# Patient Record
Sex: MALE | Race: WHITE | NOT HISPANIC OR LATINO | Employment: UNEMPLOYED | URBAN - METROPOLITAN AREA
[De-identification: names, ages, dates, MRNs, and addresses within clinical notes are randomized per-mention and may not be internally consistent; named-entity substitution may affect disease eponyms.]

---

## 2018-04-18 ENCOUNTER — APPOINTMENT (EMERGENCY)
Dept: RADIOLOGY | Facility: HOSPITAL | Age: 58
End: 2018-04-18
Payer: MEDICARE

## 2018-04-18 ENCOUNTER — HOSPITAL ENCOUNTER (EMERGENCY)
Facility: HOSPITAL | Age: 58
Discharge: HOME | End: 2018-04-18
Attending: EMERGENCY MEDICINE
Payer: MEDICARE

## 2018-04-18 VITALS
SYSTOLIC BLOOD PRESSURE: 189 MMHG | HEART RATE: 85 BPM | OXYGEN SATURATION: 97 % | RESPIRATION RATE: 16 BRPM | HEIGHT: 74 IN | TEMPERATURE: 97.9 F | BODY MASS INDEX: 30.16 KG/M2 | DIASTOLIC BLOOD PRESSURE: 106 MMHG | WEIGHT: 235 LBS

## 2018-04-18 DIAGNOSIS — M54.32 SCIATICA OF LEFT SIDE: Primary | ICD-10-CM

## 2018-04-18 LAB
BACTERIA UR QL AUTO: ABNORMAL /HPF
BILIRUB UR QL STRIP: NEGATIVE
CLARITY UR: CLEAR
COLOR UR: YELLOW
GLUCOSE UR STRIP-MCNC: NEGATIVE MG/DL
HGB UR QL STRIP.AUTO: NEGATIVE
KETONES UR STRIP-MCNC: NEGATIVE MG/DL
LEUKOCYTE ESTERASE UR QL STRIP: NEGATIVE
NITRITE UR QL STRIP: NEGATIVE
NON-SQ EPI CELLS URNS QL MICRO: ABNORMAL /HPF
PH UR STRIP.AUTO: 5.5 [PH] (ref 5–9)
PROT UR STRIP-MCNC: ABNORMAL MG/DL
RBC #/AREA URNS AUTO: ABNORMAL /HPF
SP GR UR STRIP.AUTO: 1.02 (ref 1–1.03)
UROBILINOGEN UR QL STRIP.AUTO: 0.2 E.U./DL
WBC #/AREA URNS AUTO: ABNORMAL /HPF

## 2018-04-18 PROCEDURE — 72100 X-RAY EXAM L-S SPINE 2/3 VWS: CPT

## 2018-04-18 PROCEDURE — 96372 THER/PROPH/DIAG INJ SC/IM: CPT

## 2018-04-18 PROCEDURE — 81001 URINALYSIS AUTO W/SCOPE: CPT | Performed by: EMERGENCY MEDICINE

## 2018-04-18 PROCEDURE — 99283 EMERGENCY DEPT VISIT LOW MDM: CPT

## 2018-04-18 RX ORDER — RANOLAZINE 500 MG/1
500 TABLET, EXTENDED RELEASE ORAL 2 TIMES DAILY
Status: ON HOLD | COMMUNITY
End: 2019-06-11 | Stop reason: SDUPTHER

## 2018-04-18 RX ORDER — KETOROLAC TROMETHAMINE 30 MG/ML
60 INJECTION, SOLUTION INTRAMUSCULAR; INTRAVENOUS ONCE
Status: COMPLETED | OUTPATIENT
Start: 2018-04-18 | End: 2018-04-18

## 2018-04-18 RX ORDER — FAMOTIDINE 20 MG/1
20 TABLET, FILM COATED ORAL 2 TIMES DAILY
COMMUNITY
End: 2020-01-29 | Stop reason: HOSPADM

## 2018-04-18 RX ORDER — CYCLOBENZAPRINE HCL 10 MG
10 TABLET ORAL ONCE
Status: COMPLETED | OUTPATIENT
Start: 2018-04-18 | End: 2018-04-18

## 2018-04-18 RX ORDER — ASPIRIN 81 MG/1
81 TABLET ORAL DAILY
COMMUNITY
End: 2019-06-07

## 2018-04-18 RX ORDER — NAPROXEN 500 MG/1
500 TABLET ORAL 2 TIMES DAILY WITH MEALS
Qty: 30 TABLET | Refills: 0 | Status: SHIPPED | OUTPATIENT
Start: 2018-04-18 | End: 2019-06-07

## 2018-04-18 RX ORDER — GABAPENTIN 300 MG/1
100 CAPSULE ORAL 3 TIMES DAILY
COMMUNITY
End: 2019-06-07

## 2018-04-18 RX ORDER — NAPROXEN SODIUM 220 MG
220 TABLET ORAL EVERY 12 HOURS PRN
COMMUNITY
End: 2019-06-07

## 2018-04-18 RX ORDER — AMLODIPINE BESYLATE 5 MG/1
5 TABLET ORAL DAILY
COMMUNITY
End: 2020-08-10 | Stop reason: SDUPTHER

## 2018-04-18 RX ORDER — SERTRALINE HYDROCHLORIDE 100 MG/1
100 TABLET, FILM COATED ORAL DAILY
COMMUNITY
End: 2020-08-10 | Stop reason: SDDI

## 2018-04-18 RX ORDER — CLOPIDOGREL BISULFATE 75 MG/1
75 TABLET ORAL DAILY
Status: ON HOLD | COMMUNITY
End: 2019-06-11 | Stop reason: SDUPTHER

## 2018-04-18 RX ORDER — ROSUVASTATIN CALCIUM 20 MG/1
20 TABLET, COATED ORAL DAILY
Status: ON HOLD | COMMUNITY
End: 2019-06-11 | Stop reason: SDUPTHER

## 2018-04-18 RX ORDER — CYCLOBENZAPRINE HCL 10 MG
10 TABLET ORAL 2 TIMES DAILY PRN
Qty: 20 TABLET | Refills: 0 | Status: SHIPPED | OUTPATIENT
Start: 2018-04-18 | End: 2018-04-27

## 2018-04-18 RX ORDER — NALTREXONE HYDROCHLORIDE 50 MG/1
50 TABLET, FILM COATED ORAL DAILY
COMMUNITY
End: 2019-10-17

## 2018-04-18 RX ADMIN — KETOROLAC TROMETHAMINE 60 MG: 30 INJECTION, SOLUTION INTRAMUSCULAR at 09:56

## 2018-04-18 RX ADMIN — CYCLOBENZAPRINE HYDROCHLORIDE 10 MG: 10 TABLET, FILM COATED ORAL at 09:56

## 2018-04-18 NOTE — ED PROVIDER NOTES
History  No chief complaint on file  History provided by:  Patient   used: No    Back Pain   Location:  Lumbar spine  Quality:  Aching and shooting  Radiates to:  L posterior upper leg, L thigh and L knee  Pain severity:  Moderate  Pain is:  Same all the time  Onset quality:  Gradual  Duration:  5 days  Timing:  Constant  Progression:  Waxing and waning  Chronicity:  Recurrent  Context: lifting heavy objects    Context: not emotional stress, not falling, not jumping from heights, not MCA, not MVA, not occupational injury, not pedestrian accident, not physical stress, not recent illness, not recent injury and not twisting    Context comment:  While helping friend move some boxes  Relieved by:  None tried  Worsened by:  Bending, movement, touching, twisting, standing and sitting  Ineffective treatments:  None tried  Associated symptoms: no abdominal pain, no abdominal swelling, no bladder incontinence, no bowel incontinence, no chest pain, no dysuria, no fever, no headaches, no leg pain, no numbness, no paresthesias, no pelvic pain, no perianal numbness, no tingling, no weakness and no weight loss    Associated symptoms comment:  Urinary hesitancy, - reminescent of when he had prostate CA, chronic, recurrent - recently evaluated by PMD for UTI, tx ppx  Risk factors: hx of cancer and obesity    Risk factors: no hx of osteoporosis, no lack of exercise, no menopause, not pregnant, no recent surgery, no steroid use and no vascular disease    Risk factors comment:  H/o prostate CA, tx with radiation, in remission      None       No past medical history on file  No past surgical history on file  No family history on file  I have reviewed and agree with the history as documented      Social History   Substance Use Topics    Smoking status: Not on file    Smokeless tobacco: Not on file    Alcohol use Not on file        Review of Systems   Constitutional: Negative for chills, diaphoresis, fatigue, fever and weight loss  HENT: Negative for congestion and sore throat  Respiratory: Negative for chest tightness and shortness of breath  Cardiovascular: Negative for chest pain  Gastrointestinal: Negative for abdominal pain, bowel incontinence, constipation, diarrhea and vomiting  Endocrine: Negative for polydipsia, polyphagia and polyuria  Genitourinary: Positive for difficulty urinating  Negative for bladder incontinence, dysuria, flank pain and pelvic pain  Musculoskeletal: Positive for back pain  Skin: Negative for rash and wound  Allergic/Immunologic: Negative for immunocompromised state  Neurological: Negative for dizziness, tingling, syncope, weakness, numbness, headaches and paresthesias  Psychiatric/Behavioral: The patient is nervous/anxious  Physical Exam  ED Triage Vitals   Temp Pulse Resp BP SpO2   -- -- -- -- --      Temp src Heart Rate Source Patient Position - Orthostatic VS BP Location FiO2 (%)   -- -- -- -- --      Pain Score       --           Orthostatic Vital Signs  There were no vitals filed for this visit  Physical Exam   Constitutional: He is oriented to person, place, and time  He appears well-developed and well-nourished  No distress  HENT:   Head: Normocephalic and atraumatic  Mouth/Throat: Oropharynx is clear and moist    Eyes: Pupils are equal, round, and reactive to light  Neck: Normal range of motion  Neck supple  Cardiovascular: Normal rate, regular rhythm and intact distal pulses  Pulmonary/Chest: Effort normal and breath sounds normal    Abdominal: Soft  Bowel sounds are normal    Musculoskeletal: Normal range of motion  He exhibits tenderness  He exhibits no edema or deformity  Back:         Legs:  Neurological: He is alert and oriented to person, place, and time  Skin: Skin is dry  He is not diaphoretic  Psychiatric: His behavior is normal  Thought content normal    anxious   Nursing note and vitals reviewed        ED Medications  Medications - No data to display    Diagnostic Studies  Results Reviewed     None                 No orders to display              Procedures  Procedures       Phone Contacts  ED Phone Contact    ED Course  ED Course                                MDM  Number of Diagnoses or Management Options  Sciatica of left side: new and does not require workup  Diagnosis management comments: Likely sciatica, lumbar radiculopathy, lumbar strain;  Given history and urinary symptoms r/o infectious etiology, lumbar lesion  - xray, LS spine  - UA, UCx  - PRN analgesia, antiinflammatories, muscle relaxant  - f/u PMD, Urology       Amount and/or Complexity of Data Reviewed  Clinical lab tests: ordered and reviewed  Tests in the radiology section of CPT®: ordered and reviewed  Decide to obtain previous medical records or to obtain history from someone other than the patient: yes  Review and summarize past medical records: yes    Risk of Complications, Morbidity, and/or Mortality  Presenting problems: low  Diagnostic procedures: low  Management options: low    Patient Progress  Patient progress: stable    CritCare Time    Disposition  Final diagnoses:   None     ED Disposition     None      Follow-up Information    None       Patient's Medications    No medications on file     No discharge procedures on file      ED Provider  Electronically Signed by           Reinaldo Francois MD  04/18/18 8957

## 2018-04-18 NOTE — DISCHARGE INSTRUCTIONS
Sciatica   WHAT YOU NEED TO KNOW:   What is sciatica? Sciatica is a condition that causes pain along your sciatic nerve  The sciatic nerve runs from your spine through both sides of your buttocks  It then runs down the back of your thigh, into your lower leg and foot  Any place along your sciatic nerve may be compressed, inflamed, irritated, or stretched and cause symptoms  What causes sciatica? Sciatica may be related to certain activities, poor posture, and physical or psychological stress  Any of the following may cause or increase your risk of sciatica:  · Disc problems:  A slipped disc (soft cushion in between the bones of the spine) is the most common cause of sciatica  The disc may press on the sciatic nerve  One bone in your spine may slip over another, or you may have narrowing of the spinal column  · Muscle injury: This may happen after you twist or lift a heavy object  Swelling from sprained or irritated muscles in the buttocks, thighs, or legs press on the sciatic nerve  · Obesity or pregnancy:  Extra weight increases pressure on your back and legs  · Trauma:  Direct blows on the buttocks, thighs, or legs, car accidents, or falls may injure the sciatic nerve  · Diseases of the spine:  Arthritis, osteoporosis, cancer, or infection of the spine may also affect the sciatic nerve  What are the signs and symptoms of sciatica? The symptoms of sciatic may be short-term or long-term:  · Pain that goes from the lower back into your buttocks and down the back of your thigh    · Numbness or tingling in your buttocks and legs    · Muscle weakness, difficulty moving or controlling your leg or foot    · Leg pain that increases with standing, sitting, or squatting  How is sciatica diagnosed? Your healthcare provider will ask about other health conditions you may have  He may ask you about your job, history of back pain, diseases, or surgeries you have had   He will examine you and move your legs to see what increases pain  You may also need any of the following:  · X-rays: This is a picture of the bones and tissues in your back, hip, thigh, or leg  This test may show other problems, such as fractures (broken bones)  · CT scan: This test is also called a CAT scan  An x-ray machine uses a computer to take pictures of your hips, thighs, and legs  The pictures may show your sciatic nerve, muscles, and blood vessels  You may be given a dye before the pictures are taken to help healthcare providers see the pictures better  Tell the healthcare provider if you have ever had an allergic reaction to contrast dye  · MRI:  This scan uses powerful magnets and a computer to take pictures of your hips, thighs, and legs  An MRI may show damaged nerves, muscles, bones, and blood vessels  You may be given dye to help the pictures show up better  Tell the healthcare provider if you have ever had an allergic reaction to contrast dye  Do not enter the MRI room with anything metal  Metal can cause serious injury  Tell the healthcare provider if you have any metal in or on your body  · An electromyography (EMG)  test measures the electrical activity of your muscles at rest and with movement  · Nerve conduction tests: These tests check how surface nerves and related muscles respond to stimulation  Electrodes with wires or tiny needles are placed on certain areas, such as the buttocks and legs  How is sciatica treated? · NSAIDs:  These medicines decrease swelling and pain  NSAIDs are available without a doctor's order  Ask your healthcare provider which medicine is right for you  Ask how much to take and when to take it  Take as directed  NSAIDs can cause stomach bleeding or kidney problems if not taken correctly  · Acetaminophen: This medicine decreases pain  Acetaminophen is available without a doctor's order  Ask how much to take and how often to take it  Follow directions   Acetaminophen can cause liver damage if not taken correctly  · Muscle relaxers  help decrease pain and muscle spasms  · Epidural steroid medicine: This may include both an anesthetic (numbing medicine) and a steroid, which may decrease swelling and relieve pain  It is given as a shot close to the spine in the area where you have pain  · Chemonucleolysis: This is an injection given into the damaged disc to soften or shrink the disc  · Surgery: This may be done to correct problems such as a damaged disc, or a tumor in your spine  It may be done to decrease the pressure on the sciatic nerve  Healthcare providers may also release the muscle that may be pressing into your sciatic nerve  How can I help manage sciatica? · Ultrasound therapy: This is a machine that uses sound waves to decrease pain  Topical medicines may be added to help decrease pain and inflammation  · Physical therapy:  A physical therapist teaches you exercises to help improve movement and strength, and to decrease pain  An occupational therapist teaches you skills to help with your daily activities  · Assistive devices: You may need to wear back support, such as a back brace  You may need crutches, a cane, or a walker to decrease stress on your lower back and leg muscles  Ask your healthcare provider for more information about assistive devices and how to use them correctly  How can sciatica be prevented? · Avoid pressure on your back and legs:  Do not  lift heavy objects, or stand or sit for long periods of time  · Lift objects safely:  Keep your back straight and bend your knees when you  an object  Do not bend or twist your back when you lift  · Maintain a healthy weight:  Ask your healthcare provider how much you should weigh  Ask him to help you create a weight loss plan if you are overweight  · Exercise:  Ask your healthcare provider about the best stretching, warmup, and exercise plan for you    What are the risks of sciatica? An epidural steroid injection can lead to pain disorders or paralysis if it is placed incorrectly  It may also cause headaches, leg pain, and blockage of blood flow to the spinal cord  Surgery may cause you to bleed or get an infection  If not treated, your muscles and nerves may become damaged permanently  You may have decreased strength  You may not be able to move your leg or control when you urinate or have bowel movements  When should I contact my healthcare provider? · You have pain in your lower back at night or when resting  · You have pain in your lower back with numbness below the knee  · You have weakness in one leg only  · You have questions or concerns about your condition or care  When should I seek immediate care or call 911? · You have trouble holding back your urine or bowel movements  · You have weakness in both legs  · You have numbness in your groin or buttocks  CARE AGREEMENT:   You have the right to help plan your care  Learn about your health condition and how it may be treated  Discuss treatment options with your caregivers to decide what care you want to receive  You always have the right to refuse treatment  The above information is an  only  It is not intended as medical advice for individual conditions or treatments  Talk to your doctor, nurse or pharmacist before following any medical regimen to see if it is safe and effective for you  © 2017 [address] [employer] Information is for End User's use only and may not be sold, redistributed or otherwise used for commercial purposes  All illustrations and images included in CareNotes® are the copyrighted property of [employer]  or [address]

## 2018-04-27 ENCOUNTER — APPOINTMENT (EMERGENCY)
Dept: RADIOLOGY | Facility: HOSPITAL | Age: 58
End: 2018-04-27
Payer: MEDICARE

## 2018-04-27 ENCOUNTER — HOSPITAL ENCOUNTER (EMERGENCY)
Facility: HOSPITAL | Age: 58
Discharge: HOME | End: 2018-04-27
Attending: EMERGENCY MEDICINE | Admitting: EMERGENCY MEDICINE
Payer: MEDICARE

## 2018-04-27 VITALS
TEMPERATURE: 98.3 F | HEIGHT: 74 IN | BODY MASS INDEX: 30.8 KG/M2 | DIASTOLIC BLOOD PRESSURE: 82 MMHG | RESPIRATION RATE: 16 BRPM | WEIGHT: 240 LBS | HEART RATE: 86 BPM | SYSTOLIC BLOOD PRESSURE: 147 MMHG | OXYGEN SATURATION: 96 %

## 2018-04-27 DIAGNOSIS — M62.830 MUSCLE SPASM OF BACK: Primary | ICD-10-CM

## 2018-04-27 PROCEDURE — 99283 EMERGENCY DEPT VISIT LOW MDM: CPT

## 2018-04-27 PROCEDURE — 96372 THER/PROPH/DIAG INJ SC/IM: CPT

## 2018-04-27 PROCEDURE — 72131 CT LUMBAR SPINE W/O DYE: CPT

## 2018-04-27 RX ORDER — BACLOFEN 10 MG/1
10 TABLET ORAL 3 TIMES DAILY
Status: DISCONTINUED | OUTPATIENT
Start: 2018-04-27 | End: 2018-04-27 | Stop reason: HOSPADM

## 2018-04-27 RX ORDER — BACLOFEN 10 MG/1
10 TABLET ORAL 3 TIMES DAILY
Qty: 9 TABLET | Refills: 0 | Status: SHIPPED | OUTPATIENT
Start: 2018-04-27 | End: 2019-06-11 | Stop reason: HOSPADM

## 2018-04-27 RX ORDER — KETOROLAC TROMETHAMINE 30 MG/ML
30 INJECTION, SOLUTION INTRAMUSCULAR; INTRAVENOUS ONCE
Status: DISCONTINUED | OUTPATIENT
Start: 2018-04-27 | End: 2018-04-27

## 2018-04-27 RX ORDER — LIDOCAINE 50 MG/G
1 PATCH TOPICAL DAILY
Qty: 10 PATCH | Refills: 0 | Status: SHIPPED | OUTPATIENT
Start: 2018-04-27 | End: 2018-05-07

## 2018-04-27 RX ORDER — KETOROLAC TROMETHAMINE 30 MG/ML
15 INJECTION, SOLUTION INTRAMUSCULAR; INTRAVENOUS ONCE
Status: COMPLETED | OUTPATIENT
Start: 2018-04-27 | End: 2018-04-27

## 2018-04-27 RX ADMIN — BACLOFEN 10 MG: 10 TABLET ORAL at 10:43

## 2018-04-27 RX ADMIN — KETOROLAC TROMETHAMINE 15 MG: 30 INJECTION, SOLUTION INTRAMUSCULAR at 10:28

## 2018-04-27 NOTE — ED PROVIDER NOTES
History  Chief Complaint   Patient presents with    Back Pain     back pain     67 y/o male presenting with lower back pain x 3 weeks that has been overall persistent occasionally radiating into the left buttock and thigh ranking 7/10  States that 3 weeks ago he was intoxicated and fell down some stairs landing onto his buttock, however did not lose consciousness nor hit his head  Pain began after the next few days from that fall  Was seen here for same symptoms with a lumbar xray and started on naproxen and flexeril  Radiating pain has resolved however continues with left sided lower back pain  Denies numbness, paresthesias, saddle anesthesias, weakness, changes in urination, fevers, bladder or bowel incontinence or retention  Prior to Admission Medications   Prescriptions Last Dose Informant Patient Reported? Taking?    amLODIPine (NORVASC) 5 mg tablet   Yes No   Sig: Take 5 mg by mouth daily   aspirin (ECOTRIN LOW STRENGTH) 81 mg EC tablet   Yes No   Sig: Take 81 mg by mouth daily   clopidogrel (PLAVIX) 75 mg tablet   Yes No   Sig: Take 75 mg by mouth daily   cyclobenzaprine (FLEXERIL) 10 mg tablet   No No   Sig: Take 1 tablet (10 mg total) by mouth 2 (two) times a day as needed for muscle spasms   famotidine (PEPCID) 20 mg tablet   Yes No   Sig: Take 20 mg by mouth 2 (two) times a day   fluticasone-salmeterol (ADVAIR) 500-50 mcg/dose   Yes No   Sig: Inhale 1 puff every 12 (twelve) hours   gabapentin (NEURONTIN) 300 mg capsule   Yes No   Sig: Take 100 mg by mouth 3 (three) times a day   metFORMIN (GLUCOPHAGE) 1000 MG tablet   Yes No   Sig: Take 1,000 mg by mouth 2 (two) times a day with meals   metoprolol tartrate (LOPRESSOR) 25 mg tablet   Yes No   Sig: Take 25 mg by mouth every 12 (twelve) hours   naltrexone (REVIA) 50 mg tablet   Yes No   Sig: Take 50 mg by mouth daily   naproxen (NAPROSYN) 500 mg tablet   No No   Sig: Take 1 tablet (500 mg total) by mouth 2 (two) times a day with meals naproxen sodium (ALEVE) 220 MG tablet   Yes No   Sig: Take 220 mg by mouth every 12 (twelve) hours as needed for mild pain   ranolazine (RANEXA) 500 mg 12 hr tablet   Yes No   Sig: Take 500 mg by mouth 2 (two) times a day   rosuvastatin (CRESTOR) 20 MG tablet   Yes No   Sig: Take 20 mg by mouth daily   sertraline (ZOLOFT) 100 mg tablet   Yes No   Sig: Take 50 mg by mouth daily      Facility-Administered Medications: None       Past Medical History:   Diagnosis Date    Cancer [facility])     prostate ca,had radiation    Cardiac disease     stents,then triple bypass    [facility] [address] [facility]     [facility]     Hyperlipidemia     Hypertension     Renal disorder     pyelonephritis       Past Surgical History:   Procedure Laterality Date    TONSILLECTOMY         History reviewed  No pertinent family history  I have reviewed and agree with the history as documented  Social History   Substance Use Topics    Smoking status: Current Every Day Smoker     Packs/day: 1 00    Smokeless tobacco: Never Used    Alcohol use Yes      Comment: beer 6 pack a day        Review of Systems   Constitutional: Negative  Negative for chills, fever and unexpected weight change  Able to walk without difficulty  Denies IV drug use     HENT: Negative  Respiratory: Negative  Negative for cough, chest tightness, shortness of breath and wheezing  Cardiovascular: Negative  Negative for chest pain and palpitations  Gastrointestinal: Negative  Negative for abdominal pain, constipation, diarrhea, nausea and vomiting  Genitourinary: Negative  Negative for difficulty urinating, dysuria, flank pain, frequency, hematuria and urgency  Denies numbness, tingling in the groin  Musculoskeletal: Positive for back pain  Negative for arthralgias, gait problem, neck pain and neck stiffness  Skin: Negative  Negative for color change  Neurological: Negative    Negative for dizziness, tremors, weakness, light-headedness, numbness and headaches  Denies numbness  All other systems reviewed and are negative  Physical Exam  ED Triage Vitals [04/27/18 0939]   Temperature Pulse Respirations Blood Pressure SpO2   98 3 °F (36 8 °C) 86 16 147/82 96 %      Temp Source Heart Rate Source Patient Position - Orthostatic VS BP Location FiO2 (%)   Oral Monitor Sitting Left arm --      Pain Score       7           Orthostatic Vital Signs  Vitals:    04/27/18 0939   BP: 147/82   Pulse: 86   Patient Position - Orthostatic VS: Sitting       Physical Exam   Constitutional: He is oriented to person, place, and time  He appears well-developed and well-nourished  HENT:   Head: Normocephalic and atraumatic  Eyes: Conjunctivae and EOM are normal  Pupils are equal, round, and reactive to light  Cardiovascular: Normal rate, regular rhythm, normal heart sounds and intact distal pulses  Exam reveals no gallop and no friction rub  No murmur heard  Pulmonary/Chest: Effort normal and breath sounds normal  No respiratory distress  He has no wheezes  He has no rales  He exhibits no tenderness  spo2 is 96% indicating adequate oxygenation  Abdominal: Soft  Bowel sounds are normal    Musculoskeletal:        Arms:  Neurological: He is alert and oriented to person, place, and time  Skin: Skin is warm and dry  Capillary refill takes less than 2 seconds  Nursing note and vitals reviewed  ED Medications  Medications   ketorolac (TORADOL) injection 15 mg (15 mg Intramuscular Given 4/27/18 1028)       Diagnostic Studies  Results Reviewed     None                 CT lumbar spine without contrast   Final Result by Dominguez Chavez MD (04/27 1058)   1  No acute osseous abnormality  2   Moderate discogenic and facet joint degenerative changes most prominent at L4-5 and L5-S1        Workstation performed: [id number]                    Procedures  Procedures       Phone Contacts  ED Phone Contact    ED Course                               MDM  Number of Diagnoses or Management Options  Muscle spasm of back:   Diagnosis management comments: As patient had a significant injury while intoxicated, leading to lower back pain with radiculopathy, CT scan lumbar spine order to assess for fracture  Discussed with patient the results of the imaging studies  tolerated toradol very well with decreased pain  Will switch muscle relaxant and add lidocaine patch  Will have patient f/u with pcp or orthopedics should symptoms persist  Patient is informed to return to the emergency department for worsening of symptoms and was given proper education regarding their diagnosis and symptoms  Otherwise the patient is informed to follow up with their primary care doctor for re-evaluation  The patient verbalizes understanding and agrees with above assessment and plan  All questions were answered  Please Note: Fluency Direct voice recognition software may have been used in the creation of this document  Wrong words or sound a like substitutions may have occurred due to the inherent limitations of the voice software  Amount and/or Complexity of Data Reviewed  Tests in the radiology section of CPT®: reviewed and ordered  Review and summarize past medical records: yes  Independent visualization of images, tracings, or specimens: yes      CritCare Time    Disposition  Final diagnoses:   Muscle spasm of back     Time reflects when diagnosis was documented in both MDM as applicable and the Disposition within this note     Time User Action Codes Description Comment    4/27/2018 12:14 PM Ishan Steve Add [[id number]] Muscle spasm of back       ED Disposition     ED Disposition Condition Comment    Discharge  Sav Steinberg discharge to home/self care      Condition at discharge: Good        Follow-up Information     Follow up With Specialties Details Why Contact Info Additional [address] [facility] Emergency Department Emergency Medicine Go to If symptoms worsen such as numbness or tingling in the groin, fevers etc  [address] [facility], [place], [place], [address] [place] [address] Av  Schedule an appointment as soon as possible for a visit As needed [address] [place] [address]           Discharge Medication List as of 4/27/2018 12:16 PM      START taking these medications    Details   baclofen 10 mg tablet Take 1 tablet (10 mg total) by mouth 3 (three) times a day for 3 days, Starting Fri 4/27/2018, Until Mon 4/30/2018, Print      lidocaine (LIDODERM) 5 % Place 1 patch on the skin daily for 10 days Remove & Discard patch within 12 hours or as directed by MD, Starting Fri 4/27/2018, Until Mon 5/7/2018, Print      lidocaine (XYLOCAINE) 2 % topical gel Apply 1 application topically 3 (three) times a day for 10 days, Starting Fri 4/27/2018, Until Mon 5/7/2018, Print         CONTINUE these medications which have NOT CHANGED    Details   amLODIPine (NORVASC) 5 mg tablet Take 5 mg by mouth daily, Historical Med      aspirin (ECOTRIN LOW STRENGTH) 81 mg EC tablet Take 81 mg by mouth daily, Historical Med      clopidogrel (PLAVIX) 75 mg tablet Take 75 mg by mouth daily, Historical Med      famotidine (PEPCID) 20 mg tablet Take 20 mg by mouth 2 (two) times a day, Historical Med      fluticasone-salmeterol (ADVAIR) 500-50 mcg/dose Inhale 1 puff every 12 (twelve) hours, Historical Med      gabapentin (NEURONTIN) 300 mg capsule Take 100 mg by mouth 3 (three) times a day, Historical Med      metFORMIN (GLUCOPHAGE) 1000 MG tablet Take 1,000 mg by mouth 2 (two) times a day with meals, Historical Med      metoprolol tartrate (LOPRESSOR) 25 mg tablet Take 25 mg by mouth every 12 (twelve) hours, Historical Med      naltrexone (REVIA) 50 mg tablet Take 50 mg by mouth daily, Historical Med      naproxen (NAPROSYN) 500 mg tablet Take 1 tablet (500 mg total) by mouth 2 (two) times a day with meals, Starting Wed 4/18/2018, Print      naproxen sodium (ALEVE) 220 MG tablet Take 220 mg by mouth every 12 (twelve) hours as needed for mild pain, Historical Med      ranolazine (RANEXA) 500 mg 12 hr tablet Take 500 mg by mouth 2 (two) times a day, Historical Med      rosuvastatin (CRESTOR) 20 MG tablet Take 20 mg by mouth daily, Historical Med      sertraline (ZOLOFT) 100 mg tablet Take 50 mg by mouth daily, Historical Med         STOP taking these medications       cyclobenzaprine (FLEXERIL) 10 mg tablet Comments:   Reason for Stopping:             No discharge procedures on file      ED Provider  Electronically Signed by           Santos Masters PA-C  04/27/18 2049

## 2018-04-27 NOTE — DISCHARGE INSTRUCTIONS
Muscle Spasm   WHAT YOU NEED TO KNOW:   A muscle spasm is a sudden contraction of any muscle or group of muscles  A muscle cramp is a painful muscle spasm  Muscle cramps commonly occur after intense exercise or during pregnancy  They may also be caused by certain medications, dehydration, low calcium or magnesium levels, or another medical condition  DISCHARGE INSTRUCTIONS:   Medicines: You may need the following:  · NSAIDs  help decrease swelling and pain or fever  This medicine is available with or without a doctor's order  NSAIDs can cause stomach bleeding or kidney problems in certain people  If you take blood thinner medicine, always ask your healthcare provider if NSAIDs are safe for you  Always read the medicine label and follow directions  · Take your medicine as directed  Contact your healthcare provider if you think your medicine is not helping or if you have side effects  Tell him of her if you are allergic to any medicine  Keep a list of the medicines, vitamins, and herbs you take  Include the amounts, and when and why you take them  Bring the list or the pill bottles to follow-up visits  Carry your medicine list with you in case of an emergency  Follow up with your healthcare provider as directed: You may need other tests or treatment  You may also be referred to a physical therapist or other specialist  Write down your questions so you remember to ask them during your visits  Self-care:   · Stretch  your muscle to help relieve the cramp  It may be helpful to keep your muscle in the stretched position until the cramp is gone  · Apply heat  to help decrease pain and muscle spasms  Apply heat on the area for 20 to 30 minutes every 2 hours for as many days as directed  · Apply ice  to help decrease swelling and pain  Ice may also help prevent tissue damage  Use an ice pack, or put crushed ice in a plastic bag   Cover it with a towel and place it on your muscle for 15 to 20 minutes every hour or as directed  · Drink more liquids  to help prevent muscle cramps caused by dehydration  Sports drinks may help replace electrolytes you lose through sweat during exercise  Ask your healthcare provider how much liquid to drink each day and which liquids are best for you  · Eat healthy foods , such as fruits, vegetables, whole grains, low-fat dairy products, and lean proteins (meat, beans, and fish)  If you are pregnant, ask your healthcare provider about foods that are high in magnesium and sodium  They may help to relieve cramps during pregnancy  · Massage your muscle  to help relieve the cramp  · Take frequent deep breaths  until the cramp feels better  Lie down while you take the deep breaths so you do not get dizzy or lightheaded  Contact your healthcare provider if:   · You have signs of dehydration, such as a headache, dark yellow urine, dry eyes or mouth, or a fast heartbeat  · You have questions or concerns about your condition or care  Return to the emergency department if:   · You have warmth, swelling, or redness in the cramping muscle  · You have frequent or unrelieved muscle cramps in several different muscles  · You have muscle cramps with numbness, tingling, and burning in your hands and feet  © 2017 [address] Information is for End User's use only and may not be sold, redistributed or otherwise used for commercial purposes  All illustrations and images included in CareNotes® are the copyrighted property of [employer] , [employer]  or Mick Milner  The above information is an  only  It is not intended as medical advice for individual conditions or treatments  Talk to your doctor, nurse or pharmacist before following any medical regimen to see if it is safe and effective for you

## 2018-10-09 ENCOUNTER — APPOINTMENT (EMERGENCY)
Dept: RADIOLOGY | Facility: HOSPITAL | Age: 58
DRG: 190 | End: 2018-10-09
Payer: MEDICARE

## 2018-10-09 ENCOUNTER — HOSPITAL ENCOUNTER (INPATIENT)
Facility: HOSPITAL | Age: 58
LOS: 2 days | Discharge: LEFT BEFORE DISCHARGE | DRG: 190 | End: 2018-10-11
Attending: EMERGENCY MEDICINE | Admitting: INTERNAL MEDICINE
Payer: MEDICARE

## 2018-10-09 DIAGNOSIS — J18.9 PNEUMONIA: ICD-10-CM

## 2018-10-09 DIAGNOSIS — J44.9 COPD (CHRONIC OBSTRUCTIVE PULMONARY DISEASE) (HCC): Primary | ICD-10-CM

## 2018-10-09 PROBLEM — E11.9 TYPE 2 DIABETES MELLITUS (HCC): Status: ACTIVE | Noted: 2018-10-09

## 2018-10-09 PROBLEM — E87.2 LACTIC ACIDOSIS: Status: ACTIVE | Noted: 2018-10-09

## 2018-10-09 PROBLEM — J44.1 CHRONIC OBSTRUCTIVE PULMONARY DISEASE WITH ACUTE EXACERBATION (HCC): Status: ACTIVE | Noted: 2018-10-09

## 2018-10-09 PROBLEM — F10.10 ALCOHOL ABUSE: Status: ACTIVE | Noted: 2018-10-09

## 2018-10-09 PROBLEM — E87.20 LACTIC ACIDOSIS: Status: ACTIVE | Noted: 2018-10-09

## 2018-10-09 PROBLEM — I10 ESSENTIAL (PRIMARY) HYPERTENSION: Status: ACTIVE | Noted: 2018-10-09

## 2018-10-09 LAB
ALBUMIN SERPL BCP-MCNC: 3.2 G/DL (ref 3.5–5)
ALP SERPL-CCNC: 74 U/L (ref 46–116)
ALT SERPL W P-5'-P-CCNC: 35 U/L (ref 12–78)
ANION GAP SERPL CALCULATED.3IONS-SCNC: 10 MMOL/L (ref 4–13)
APTT PPP: 30 SECONDS (ref 24–33)
AST SERPL W P-5'-P-CCNC: 43 U/L (ref 5–45)
BASOPHILS # BLD AUTO: 0.12 THOUSANDS/ΜL (ref 0–0.1)
BASOPHILS NFR BLD AUTO: 1 % (ref 0–1)
BILIRUB SERPL-MCNC: 0.3 MG/DL (ref 0.2–1)
BUN SERPL-MCNC: 10 MG/DL (ref 5–25)
CALCIUM SERPL-MCNC: 8.2 MG/DL (ref 8.3–10.1)
CHLORIDE SERPL-SCNC: 100 MMOL/L (ref 100–108)
CO2 SERPL-SCNC: 26 MMOL/L (ref 21–32)
CREAT SERPL-MCNC: 0.81 MG/DL (ref 0.6–1.3)
EOSINOPHIL # BLD AUTO: 0.12 THOUSAND/ΜL (ref 0–0.61)
EOSINOPHIL NFR BLD AUTO: 1 % (ref 0–6)
ERYTHROCYTE [DISTWIDTH] IN BLOOD BY AUTOMATED COUNT: 13.8 % (ref 11.6–15.1)
ETHANOL SERPL-MCNC: <3 MG/DL (ref 0–3)
GFR SERPL CREATININE-BSD FRML MDRD: 99 ML/MIN/1.73SQ M
GLUCOSE SERPL-MCNC: 93 MG/DL (ref 65–140)
HCT VFR BLD AUTO: 42.1 % (ref 36.5–49.3)
HGB BLD-MCNC: 13.9 G/DL (ref 12–17)
IMM GRANULOCYTES # BLD AUTO: 0.06 THOUSAND/UL (ref 0–0.2)
IMM GRANULOCYTES NFR BLD AUTO: 1 % (ref 0–2)
INR PPP: 1.05 (ref 0.86–1.16)
LACTATE SERPL-SCNC: 2.9 MMOL/L (ref 0.5–2)
LACTATE SERPL-SCNC: 3 MMOL/L (ref 0.5–2)
LIPASE SERPL-CCNC: 226 U/L (ref 73–393)
LYMPHOCYTES # BLD AUTO: 1.23 THOUSANDS/ΜL (ref 0.6–4.47)
LYMPHOCYTES NFR BLD AUTO: 12 % (ref 14–44)
MAGNESIUM SERPL-MCNC: 1 MG/DL (ref 1.6–2.6)
MCH RBC QN AUTO: 31.9 PG (ref 26.8–34.3)
MCHC RBC AUTO-ENTMCNC: 33 G/DL (ref 31.4–37.4)
MCV RBC AUTO: 97 FL (ref 82–98)
MONOCYTES # BLD AUTO: 1.13 THOUSAND/ΜL (ref 0.17–1.22)
MONOCYTES NFR BLD AUTO: 11 % (ref 4–12)
NEUTROPHILS # BLD AUTO: 8.07 THOUSANDS/ΜL (ref 1.85–7.62)
NEUTS SEG NFR BLD AUTO: 74 % (ref 43–75)
NRBC BLD AUTO-RTO: 0 /100 WBCS
PHOSPHATE SERPL-MCNC: 1.7 MG/DL (ref 2.7–4.5)
PLATELET # BLD AUTO: 224 THOUSANDS/UL (ref 149–390)
PMV BLD AUTO: 10.7 FL (ref 8.9–12.7)
POTASSIUM SERPL-SCNC: 4.4 MMOL/L (ref 3.5–5.3)
PROT SERPL-MCNC: 7.3 G/DL (ref 6.4–8.2)
PROTHROMBIN TIME: 11 SECONDS (ref 9.4–11.7)
RBC # BLD AUTO: 4.36 MILLION/UL (ref 3.88–5.62)
SODIUM SERPL-SCNC: 136 MMOL/L (ref 136–145)
WBC # BLD AUTO: 10.73 THOUSAND/UL (ref 4.31–10.16)

## 2018-10-09 PROCEDURE — 83605 ASSAY OF LACTIC ACID: CPT | Performed by: NURSE PRACTITIONER

## 2018-10-09 PROCEDURE — 99284 EMERGENCY DEPT VISIT MOD MDM: CPT

## 2018-10-09 PROCEDURE — 94664 DEMO&/EVAL PT USE INHALER: CPT

## 2018-10-09 PROCEDURE — 85730 THROMBOPLASTIN TIME PARTIAL: CPT | Performed by: EMERGENCY MEDICINE

## 2018-10-09 PROCEDURE — 80320 DRUG SCREEN QUANTALCOHOLS: CPT | Performed by: NURSE PRACTITIONER

## 2018-10-09 PROCEDURE — 83605 ASSAY OF LACTIC ACID: CPT | Performed by: EMERGENCY MEDICINE

## 2018-10-09 PROCEDURE — 94640 AIRWAY INHALATION TREATMENT: CPT

## 2018-10-09 PROCEDURE — 83880 ASSAY OF NATRIURETIC PEPTIDE: CPT | Performed by: NURSE PRACTITIONER

## 2018-10-09 PROCEDURE — 96361 HYDRATE IV INFUSION ADD-ON: CPT

## 2018-10-09 PROCEDURE — 85610 PROTHROMBIN TIME: CPT | Performed by: EMERGENCY MEDICINE

## 2018-10-09 PROCEDURE — 84100 ASSAY OF PHOSPHORUS: CPT | Performed by: NURSE PRACTITIONER

## 2018-10-09 PROCEDURE — 83735 ASSAY OF MAGNESIUM: CPT | Performed by: NURSE PRACTITIONER

## 2018-10-09 PROCEDURE — 93005 ELECTROCARDIOGRAM TRACING: CPT

## 2018-10-09 PROCEDURE — 99222 1ST HOSP IP/OBS MODERATE 55: CPT | Performed by: NURSE PRACTITIONER

## 2018-10-09 PROCEDURE — 80053 COMPREHEN METABOLIC PANEL: CPT | Performed by: EMERGENCY MEDICINE

## 2018-10-09 PROCEDURE — 87040 BLOOD CULTURE FOR BACTERIA: CPT | Performed by: EMERGENCY MEDICINE

## 2018-10-09 PROCEDURE — 96375 TX/PRO/DX INJ NEW DRUG ADDON: CPT

## 2018-10-09 PROCEDURE — 83690 ASSAY OF LIPASE: CPT | Performed by: NURSE PRACTITIONER

## 2018-10-09 PROCEDURE — 96374 THER/PROPH/DIAG INJ IV PUSH: CPT

## 2018-10-09 PROCEDURE — 71046 X-RAY EXAM CHEST 2 VIEWS: CPT

## 2018-10-09 PROCEDURE — 85025 COMPLETE CBC W/AUTO DIFF WBC: CPT | Performed by: EMERGENCY MEDICINE

## 2018-10-09 PROCEDURE — 36415 COLL VENOUS BLD VENIPUNCTURE: CPT | Performed by: EMERGENCY MEDICINE

## 2018-10-09 RX ORDER — METOPROLOL TARTRATE 50 MG/1
50 TABLET, FILM COATED ORAL DAILY
Status: DISCONTINUED | OUTPATIENT
Start: 2018-10-10 | End: 2018-10-11 | Stop reason: HOSPADM

## 2018-10-09 RX ORDER — ASPIRIN 81 MG/1
81 TABLET ORAL DAILY
Status: DISCONTINUED | OUTPATIENT
Start: 2018-10-10 | End: 2018-10-11 | Stop reason: HOSPADM

## 2018-10-09 RX ORDER — AMLODIPINE BESYLATE 5 MG/1
5 TABLET ORAL DAILY
Status: DISCONTINUED | OUTPATIENT
Start: 2018-10-10 | End: 2018-10-11 | Stop reason: HOSPADM

## 2018-10-09 RX ORDER — METHYLPREDNISOLONE SODIUM SUCCINATE 125 MG/2ML
125 INJECTION, POWDER, LYOPHILIZED, FOR SOLUTION INTRAMUSCULAR; INTRAVENOUS ONCE
Status: COMPLETED | OUTPATIENT
Start: 2018-10-09 | End: 2018-10-09

## 2018-10-09 RX ORDER — FAMOTIDINE 20 MG/1
20 TABLET, FILM COATED ORAL 2 TIMES DAILY
Status: DISCONTINUED | OUTPATIENT
Start: 2018-10-10 | End: 2018-10-11 | Stop reason: HOSPADM

## 2018-10-09 RX ORDER — ATORVASTATIN CALCIUM 40 MG/1
40 TABLET, FILM COATED ORAL
Status: DISCONTINUED | OUTPATIENT
Start: 2018-10-10 | End: 2018-10-11 | Stop reason: HOSPADM

## 2018-10-09 RX ORDER — CLOPIDOGREL BISULFATE 75 MG/1
75 TABLET ORAL DAILY
Status: DISCONTINUED | OUTPATIENT
Start: 2018-10-10 | End: 2018-10-11 | Stop reason: HOSPADM

## 2018-10-09 RX ORDER — RANOLAZINE 500 MG/1
500 TABLET, EXTENDED RELEASE ORAL 2 TIMES DAILY
Status: DISCONTINUED | OUTPATIENT
Start: 2018-10-10 | End: 2018-10-11 | Stop reason: HOSPADM

## 2018-10-09 RX ORDER — ACETAMINOPHEN 325 MG/1
650 TABLET ORAL EVERY 6 HOURS PRN
Status: DISCONTINUED | OUTPATIENT
Start: 2018-10-09 | End: 2018-10-11 | Stop reason: HOSPADM

## 2018-10-09 RX ORDER — CHLORDIAZEPOXIDE HYDROCHLORIDE 25 MG/1
50 CAPSULE, GELATIN COATED ORAL EVERY 4 HOURS
Status: COMPLETED | OUTPATIENT
Start: 2018-10-09 | End: 2018-10-10

## 2018-10-09 RX ORDER — FOLIC ACID 1 MG/1
1 TABLET ORAL DAILY
Status: DISCONTINUED | OUTPATIENT
Start: 2018-10-09 | End: 2018-10-11 | Stop reason: HOSPADM

## 2018-10-09 RX ORDER — NICOTINE 21 MG/24HR
1 PATCH, TRANSDERMAL 24 HOURS TRANSDERMAL DAILY
Status: DISCONTINUED | OUTPATIENT
Start: 2018-10-10 | End: 2018-10-11 | Stop reason: HOSPADM

## 2018-10-09 RX ORDER — IPRATROPIUM BROMIDE AND ALBUTEROL SULFATE 2.5; .5 MG/3ML; MG/3ML
3 SOLUTION RESPIRATORY (INHALATION)
Status: DISCONTINUED | OUTPATIENT
Start: 2018-10-09 | End: 2018-10-09

## 2018-10-09 RX ORDER — CHLORDIAZEPOXIDE HYDROCHLORIDE 25 MG/1
50 CAPSULE, GELATIN COATED ORAL EVERY 6 HOURS SCHEDULED
Status: DISCONTINUED | OUTPATIENT
Start: 2018-10-11 | End: 2018-10-11 | Stop reason: HOSPADM

## 2018-10-09 RX ORDER — IPRATROPIUM BROMIDE AND ALBUTEROL SULFATE 2.5; .5 MG/3ML; MG/3ML
3 SOLUTION RESPIRATORY (INHALATION)
Status: DISCONTINUED | OUTPATIENT
Start: 2018-10-10 | End: 2018-10-11 | Stop reason: HOSPADM

## 2018-10-09 RX ORDER — SODIUM CHLORIDE 9 MG/ML
125 INJECTION, SOLUTION INTRAVENOUS CONTINUOUS
Status: DISCONTINUED | OUTPATIENT
Start: 2018-10-09 | End: 2018-10-11 | Stop reason: HOSPADM

## 2018-10-09 RX ORDER — METHYLPREDNISOLONE SODIUM SUCCINATE 40 MG/ML
40 INJECTION, POWDER, LYOPHILIZED, FOR SOLUTION INTRAMUSCULAR; INTRAVENOUS EVERY 6 HOURS SCHEDULED
Status: DISCONTINUED | OUTPATIENT
Start: 2018-10-10 | End: 2018-10-10

## 2018-10-09 RX ORDER — THIAMINE MONONITRATE (VIT B1) 100 MG
100 TABLET ORAL DAILY
Status: DISCONTINUED | OUTPATIENT
Start: 2018-10-09 | End: 2018-10-11 | Stop reason: HOSPADM

## 2018-10-09 RX ORDER — CHLORDIAZEPOXIDE HYDROCHLORIDE 25 MG/1
25 CAPSULE, GELATIN COATED ORAL EVERY 6 HOURS SCHEDULED
Status: DISCONTINUED | OUTPATIENT
Start: 2018-10-13 | End: 2018-10-11 | Stop reason: HOSPADM

## 2018-10-09 RX ORDER — FLUTICASONE FUROATE AND VILANTEROL 200; 25 UG/1; UG/1
1 POWDER RESPIRATORY (INHALATION)
Status: DISCONTINUED | OUTPATIENT
Start: 2018-10-10 | End: 2018-10-11 | Stop reason: HOSPADM

## 2018-10-09 RX ORDER — CHLORDIAZEPOXIDE HYDROCHLORIDE 25 MG/1
25 CAPSULE, GELATIN COATED ORAL EVERY 4 HOURS
Status: DISCONTINUED | OUTPATIENT
Start: 2018-10-12 | End: 2018-10-11 | Stop reason: HOSPADM

## 2018-10-09 RX ADMIN — CHLORDIAZEPOXIDE HYDROCHLORIDE 50 MG: 25 CAPSULE ORAL at 23:56

## 2018-10-09 RX ADMIN — IPRATROPIUM BROMIDE AND ALBUTEROL SULFATE 3 ML: .5; 3 SOLUTION RESPIRATORY (INHALATION) at 21:15

## 2018-10-09 RX ADMIN — PIPERACILLIN SODIUM,TAZOBACTAM SODIUM 3.38 G: 3; .375 INJECTION, POWDER, FOR SOLUTION INTRAVENOUS at 21:14

## 2018-10-09 RX ADMIN — SODIUM CHLORIDE 75 ML/HR: 0.9 INJECTION, SOLUTION INTRAVENOUS at 23:43

## 2018-10-09 RX ADMIN — SODIUM CHLORIDE 1000 ML: 0.9 INJECTION, SOLUTION INTRAVENOUS at 23:57

## 2018-10-09 RX ADMIN — IPRATROPIUM BROMIDE AND ALBUTEROL SULFATE 3 ML: .5; 3 SOLUTION RESPIRATORY (INHALATION) at 19:01

## 2018-10-09 RX ADMIN — SODIUM CHLORIDE 1000 ML: 0.9 INJECTION, SOLUTION INTRAVENOUS at 18:48

## 2018-10-09 RX ADMIN — METHYLPREDNISOLONE SODIUM SUCCINATE 125 MG: 125 INJECTION, POWDER, FOR SOLUTION INTRAMUSCULAR; INTRAVENOUS at 21:14

## 2018-10-09 NOTE — ED NOTES
Pt does not meet 2 SIRS criteria despite elevated lactic acid  Sepsis alert not called  Dr Linden Villalobos and Janie Soria, Charge RN in agreement       Michelet Hanson RN  10/09/18 1926

## 2018-10-09 NOTE — ED PROVIDER NOTES
History  Chief Complaint   Patient presents with    Cough     Was diagnosed with pneumonia on 10/4  was started on moxifloxacin but patient stats he doesn't feel better, states "I just don't feel right" patient is shakey and admits to drinking a 5th of vodka last night     Patient states he has been sick for about a week with headache body aches some trouble breathing  He is an active smoker with known COPD  Patient states he was diagnosed with pneumonia clinically by his PMD and started on antibiotics which he has been taking but does not feel better  Patient denies fever chills  States he has a mild cough but is not producing any sputum does not feel pain in the chest             Prior to Admission Medications   Prescriptions Last Dose Informant Patient Reported? Taking?    amLODIPine (NORVASC) 5 mg tablet   Yes No   Sig: Take 5 mg by mouth daily   aspirin (ECOTRIN LOW STRENGTH) 81 mg EC tablet   Yes No   Sig: Take 81 mg by mouth daily   baclofen 10 mg tablet   No No   Sig: Take 1 tablet (10 mg total) by mouth 3 (three) times a day for 3 days   clopidogrel (PLAVIX) 75 mg tablet   Yes No   Sig: Take 75 mg by mouth daily   famotidine (PEPCID) 20 mg tablet   Yes No   Sig: Take 20 mg by mouth 2 (two) times a day   fluticasone-salmeterol (ADVAIR) 500-50 mcg/dose   Yes No   Sig: Inhale 1 puff every 12 (twelve) hours   gabapentin (NEURONTIN) 300 mg capsule   Yes No   Sig: Take 100 mg by mouth 3 (three) times a day   lidocaine (LIDODERM) 5 %   No No   Sig: Place 1 patch on the skin daily for 10 days Remove & Discard patch within 12 hours or as directed by MD   lidocaine (XYLOCAINE) 2 % topical gel   No No   Sig: Apply 1 application topically 3 (three) times a day for 10 days   metFORMIN (GLUCOPHAGE) 1000 MG tablet   Yes No   Sig: Take 1,000 mg by mouth 2 (two) times a day with meals   metoprolol tartrate (LOPRESSOR) 25 mg tablet   Yes No   Sig: Take 25 mg by mouth every 12 (twelve) hours   naltrexone (REVIA) 50 mg tablet   Yes No   Sig: Take 50 mg by mouth daily   naproxen (NAPROSYN) 500 mg tablet   No No   Sig: Take 1 tablet (500 mg total) by mouth 2 (two) times a day with meals   naproxen sodium (ALEVE) 220 MG tablet   Yes No   Sig: Take 220 mg by mouth every 12 (twelve) hours as needed for mild pain   ranolazine (RANEXA) 500 mg 12 hr tablet   Yes No   Sig: Take 500 mg by mouth 2 (two) times a day   rosuvastatin (CRESTOR) 20 MG tablet   Yes No   Sig: Take 20 mg by mouth daily   sertraline (ZOLOFT) 100 mg tablet   Yes No   Sig: Take 50 mg by mouth daily      Facility-Administered Medications: None       Past Medical History:   Diagnosis Date    Cancer [facility])     prostate ca,had radiation    Cardiac disease     stents,then triple bypass    [facility]) [facility] [address] )     Diabetes mellitus ([facility] )     Hyperlipidemia     Hypertension     Renal disorder     pyelonephritis       Past Surgical History:   Procedure Laterality Date    TONSILLECTOMY         History reviewed  No pertinent family history  I have reviewed and agree with the history as documented  Social History   Substance Use Topics    Smoking status: Current Every Day Smoker     Packs/day: 1 50    Smokeless tobacco: Never Used    Alcohol use Yes      Comment: beer 6 pack a day, fifth of vodka        Review of Systems   Constitutional: Positive for fatigue  Negative for chills, diaphoresis and fever  HENT: Positive for congestion  Negative for sore throat  Eyes: Positive for visual disturbance  Patient states he has mild blurry vision for this week   Respiratory: Positive for cough and wheezing  Negative for chest tightness and shortness of breath  Cardiovascular: Negative for chest pain and leg swelling  Gastrointestinal: Negative for abdominal pain  Genitourinary: Negative for dysuria  Musculoskeletal: Positive for arthralgias and myalgias  Skin: Negative for rash     Neurological: Positive for weakness and headaches  Negative for syncope  Hematological: Does not bruise/bleed easily  Psychiatric/Behavioral: Negative for confusion  All other systems reviewed and are negative  Physical Exam  Physical Exam   Constitutional: He is oriented to person, place, and time  He appears well-developed and well-nourished  HENT:   Head: Normocephalic and atraumatic  Mouth/Throat: Oropharynx is clear and moist    Eyes: Conjunctivae are normal    Neck: Normal range of motion  Neck supple  Cardiovascular: Regular rhythm and normal heart sounds  Tachy but regular heart rate   Pulmonary/Chest: Effort normal  He has wheezes  He has rales  Diffuse rhonchi and harsh transmitted breath sounds   Abdominal: Soft  Bowel sounds are normal  There is no tenderness  Musculoskeletal: Normal range of motion  He exhibits no tenderness  Patient is tremulous  States he had alcohol last night but none today  Neurological: He is alert and oriented to person, place, and time  Skin: Skin is warm and dry  Psychiatric: He has a normal mood and affect  His behavior is normal    Nursing note and vitals reviewed        Vital Signs  ED Triage Vitals [10/09/18 1757]   Temperature Pulse Respirations Blood Pressure SpO2   98 8 °F (37 1 °C) (!) 107 18 (!) 159/101 95 %      Temp Source Heart Rate Source Patient Position - Orthostatic VS BP Location FiO2 (%)   Oral Monitor Lying Left arm --      Pain Score       --           Vitals:    10/09/18 1757 10/09/18 2045 10/09/18 2115   BP: (!) 159/101  (!) 179/86   Pulse: (!) 107 96 96   Patient Position - Orthostatic VS: Lying         Visual Acuity      ED Medications  Medications   ipratropium-albuterol (DUO-NEB) 0 5-2 5 mg/3 mL inhalation solution 3 mL (3 mL Nebulization Given 10/9/18 1901)   piperacillin-tazobactam (ZOSYN) IVPB 3 375 g (3 375 g Intravenous New Bag 10/9/18 2114)   ipratropium-albuterol (DUO-NEB) 0 5-2 5 mg/3 mL inhalation solution 3 mL (3 mL Nebulization Given 10/9/18 2115) sodium chloride 0 9 % bolus 1,000 mL (0 mL Intravenous Stopped 10/9/18 2000)   methylPREDNISolone sodium succinate (Solu-MEDROL) injection 125 mg (125 mg Intravenous Given 10/9/18 2114)       Diagnostic Studies  Results Reviewed     Procedure Component Value Units Date/Time    Lactic acid, plasma [[id number]]  (Abnormal) Collected:  10/09/18 1849    Lab Status:  Final result Specimen:  Blood from Arm, Left Updated:  10/09/18 1921     LACTIC ACID 2 9 (HH) mmol/L     Narrative:         Result may be elevated if tourniquet was used during collection  Comprehensive metabolic panel [[id number]]  (Abnormal) Collected:  10/09/18 1849    Lab Status:  Final result Specimen:  Blood from Arm, Left Updated:  10/09/18 1919     Sodium 136 mmol/L      Potassium 4 4 mmol/L      Chloride 100 mmol/L      CO2 26 mmol/L      ANION GAP 10 mmol/L      BUN 10 mg/dL      Creatinine 0 81 mg/dL      Glucose 93 mg/dL      Calcium 8 2 (L) mg/dL      AST 43 U/L      ALT 35 U/L      Alkaline Phosphatase 74 U/L      Total Protein 7 3 g/dL      Albumin 3 2 (L) g/dL      Total Bilirubin 0 30 mg/dL      eGFR 99 ml/min/1 73sq m     Narrative:         National Kidney Disease Education Program recommendations are as follows:  GFR calculation is accurate only with a steady state creatinine  Chronic Kidney disease less than 60 ml/min/1 73 sq  meters  Kidney failure less than 15 ml/min/1 73 sq  meters      Protime-INR [[id number]]  (Normal) Collected:  10/09/18 1849    Lab Status:  Final result Specimen:  Blood from Arm, Left Updated:  10/09/18 1918     Protime 11 0 seconds      INR 1 05    APTT [[id number]]  (Normal) Collected:  10/09/18 1849    Lab Status:  Final result Specimen:  Blood from Arm, Left Updated:  10/09/18 1918     PTT 30 seconds     CBC and differential [[id number]]  (Abnormal) Collected:  10/09/18 1849    Lab Status:  Final result Specimen:  Blood from Arm, Left Updated:  10/09/18 1856     WBC 10 73 (H) Thousand/uL      RBC 4 36 Million/uL Hemoglobin 13 9 g/dL      Hematocrit 42 1 %      MCV 97 fL      MCH 31 9 pg      MCHC 33 0 g/dL      RDW 13 8 %      MPV 10 7 fL      Platelets 241 Thousands/uL      nRBC 0 /100 WBCs      Neutrophils Relative 74 %      Immat GRANS % 1 %      Lymphocytes Relative 12 (L) %      Monocytes Relative 11 %      Eosinophils Relative 1 %      Basophils Relative 1 %      Neutrophils Absolute 8 07 (H) Thousands/µL      Immature Grans Absolute 0 06 Thousand/uL      Lymphocytes Absolute 1 23 Thousands/µL      Monocytes Absolute 1 13 Thousand/µL      Eosinophils Absolute 0 12 Thousand/µL      Basophils Absolute 0 12 (H) Thousands/µL     Blood culture #1 [[id number]] Collected:  10/09/18 1849    Lab Status: In process Specimen:  Blood from Arm, Left Updated:  10/09/18 1856    Blood culture #2 [[id number]] Collected:  10/09/18 1845    Lab Status: In process Specimen:  Blood from Arm, Right Updated:  10/09/18 1856    UA w Reflex to Microscopic w Reflex to Culture [[id number]]     Lab Status:  No result Specimen:  Urine                  XR chest 2 views    (Results Pending)              Procedures  ECG 12 Lead Documentation  Date/Time: 10/9/2018 6:27 PM  Performed by: Bhavya Fitzgerald  Authorized by: Bhavya Fitzgerald     Indications / Diagnosis:  SOB  ECG reviewed by me, the ED Provider: yes    Patient location:  ED  Interpretation:     Interpretation: abnormal    Rate:     ECG rate:  97    ECG rate assessment: normal    Rhythm:     Rhythm: sinus rhythm    Ectopy:     Ectopy: none    QRS:     QRS axis:  Normal    QRS intervals:  Normal  Conduction:     Conduction: normal    ST segments:     ST segments:  Non-specific  T waves:     T waves: non-specific             Phone Contacts  ED Phone Contact    ED Course                               MDM  Number of Diagnoses or Management Options  COPD (chronic obstructive pulmonary disease) ([facility] [address] ):   Pneumonia:   Diagnosis management comments:  Will check to come from the diagnosis of pneumonia however I believe it is more likely an exacerbation of COPD    CritCare Time    Disposition  Final diagnoses:   COPD (chronic obstructive pulmonary disease) ([facility] )   Pneumonia     Time reflects when diagnosis was documented in both MDM as applicable and the Disposition within this note     Time User Action Codes Description Comment    10/9/2018  9:16 PM Dahunter Spain Add [J44 9] [facility]     10/9/2018  9:16 PM Cindy Scot CALDERON Add [J18 9] Pneumonia       ED Disposition     ED Disposition Condition Comment    Admit  Case was discussed with CLAUDIA Diaz and the patient's admission status was agreed to be Admission Status: inpatient status to the service of Dr Crescencio Pelaez   Follow-up Information    None         Patient's Medications   Discharge Prescriptions    No medications on file     No discharge procedures on file      ED Provider  Electronically Signed by           Kenan White MD  10/09/18 2117       Kenan White MD  10/09/18 1463

## 2018-10-10 PROBLEM — Z95.1 HX OF CABG: Status: ACTIVE | Noted: 2018-10-10

## 2018-10-10 PROBLEM — E83.39 LOW PHOSPHATE LEVELS: Status: ACTIVE | Noted: 2018-10-10

## 2018-10-10 PROBLEM — F10.230 ALCOHOL WITHDRAWAL SYNDROME WITHOUT COMPLICATION (HCC): Status: ACTIVE | Noted: 2018-10-09

## 2018-10-10 PROBLEM — E83.42 HYPOMAGNESEMIA: Status: ACTIVE | Noted: 2018-10-10

## 2018-10-10 PROBLEM — F10.930 ALCOHOL WITHDRAWAL SYNDROME WITHOUT COMPLICATION (HCC): Status: ACTIVE | Noted: 2018-10-09

## 2018-10-10 PROBLEM — F32.A DEPRESSION: Status: ACTIVE | Noted: 2018-10-10

## 2018-10-10 PROBLEM — E78.5 HLD (HYPERLIPIDEMIA): Status: ACTIVE | Noted: 2018-10-10

## 2018-10-10 LAB
ANION GAP SERPL CALCULATED.3IONS-SCNC: 12 MMOL/L (ref 4–13)
ATRIAL RATE: 97 BPM
BACTERIA UR QL AUTO: ABNORMAL /HPF
BASOPHILS # BLD AUTO: 0.06 THOUSANDS/ΜL (ref 0–0.1)
BASOPHILS NFR BLD AUTO: 1 % (ref 0–1)
BILIRUB UR QL STRIP: NEGATIVE
BUN SERPL-MCNC: 10 MG/DL (ref 5–25)
CALCIUM SERPL-MCNC: 7.9 MG/DL (ref 8.3–10.1)
CHLORIDE SERPL-SCNC: 101 MMOL/L (ref 100–108)
CHOLEST SERPL-MCNC: 192 MG/DL (ref 50–200)
CLARITY UR: CLEAR
CO2 SERPL-SCNC: 27 MMOL/L (ref 21–32)
COLOR UR: YELLOW
CREAT SERPL-MCNC: 0.89 MG/DL (ref 0.6–1.3)
EOSINOPHIL # BLD AUTO: 0 THOUSAND/ΜL (ref 0–0.61)
EOSINOPHIL NFR BLD AUTO: 0 % (ref 0–6)
ERYTHROCYTE [DISTWIDTH] IN BLOOD BY AUTOMATED COUNT: 13.2 % (ref 11.6–15.1)
EST. AVERAGE GLUCOSE BLD GHB EST-MCNC: 126 MG/DL
GFR SERPL CREATININE-BSD FRML MDRD: 96 ML/MIN/1.73SQ M
GLUCOSE SERPL-MCNC: 150 MG/DL (ref 65–140)
GLUCOSE SERPL-MCNC: 151 MG/DL (ref 65–140)
GLUCOSE SERPL-MCNC: 164 MG/DL (ref 65–140)
GLUCOSE SERPL-MCNC: 190 MG/DL (ref 65–140)
GLUCOSE SERPL-MCNC: 198 MG/DL (ref 65–140)
GLUCOSE SERPL-MCNC: 254 MG/DL (ref 65–140)
GLUCOSE UR STRIP-MCNC: ABNORMAL MG/DL
HBA1C MFR BLD: 6 % (ref 4.2–6.3)
HCT VFR BLD AUTO: 41.7 % (ref 36.5–49.3)
HDLC SERPL-MCNC: 53 MG/DL (ref 40–60)
HGB BLD-MCNC: 13.8 G/DL (ref 12–17)
HGB UR QL STRIP.AUTO: ABNORMAL
IMM GRANULOCYTES # BLD AUTO: 0.05 THOUSAND/UL (ref 0–0.2)
IMM GRANULOCYTES NFR BLD AUTO: 1 % (ref 0–2)
KETONES UR STRIP-MCNC: NEGATIVE MG/DL
L PNEUMO1 AG UR QL IA.RAPID: NEGATIVE
LACTATE SERPL-SCNC: 2.2 MMOL/L (ref 0.5–2)
LACTATE SERPL-SCNC: 2.8 MMOL/L (ref 0.5–2)
LACTATE SERPL-SCNC: 2.9 MMOL/L (ref 0.5–2)
LACTATE SERPL-SCNC: 3 MMOL/L (ref 0.5–2)
LACTATE SERPL-SCNC: 3.3 MMOL/L (ref 0.5–2)
LACTATE SERPL-SCNC: 3.4 MMOL/L (ref 0.5–2)
LACTATE SERPL-SCNC: 3.5 MMOL/L (ref 0.5–2)
LACTATE SERPL-SCNC: 3.6 MMOL/L (ref 0.5–2)
LACTATE SERPL-SCNC: 3.8 MMOL/L (ref 0.5–2)
LDLC SERPL CALC-MCNC: 124 MG/DL (ref 0–100)
LEUKOCYTE ESTERASE UR QL STRIP: NEGATIVE
LYMPHOCYTES # BLD AUTO: 0.47 THOUSANDS/ΜL (ref 0.6–4.47)
LYMPHOCYTES NFR BLD AUTO: 7 % (ref 14–44)
MAGNESIUM SERPL-MCNC: 1.4 MG/DL (ref 1.6–2.6)
MCH RBC QN AUTO: 31.7 PG (ref 26.8–34.3)
MCHC RBC AUTO-ENTMCNC: 33.1 G/DL (ref 31.4–37.4)
MCV RBC AUTO: 96 FL (ref 82–98)
MONOCYTES # BLD AUTO: 0.05 THOUSAND/ΜL (ref 0.17–1.22)
MONOCYTES NFR BLD AUTO: 1 % (ref 4–12)
NEUTROPHILS # BLD AUTO: 6.45 THOUSANDS/ΜL (ref 1.85–7.62)
NEUTS SEG NFR BLD AUTO: 90 % (ref 43–75)
NITRITE UR QL STRIP: NEGATIVE
NON-SQ EPI CELLS URNS QL MICRO: ABNORMAL /HPF
NONHDLC SERPL-MCNC: 139 MG/DL
NRBC BLD AUTO-RTO: 0 /100 WBCS
NT-PROBNP SERPL-MCNC: 522 PG/ML
P AXIS: 74 DEGREES
PH UR STRIP.AUTO: 6.5 [PH] (ref 5–9)
PHOSPHATE SERPL-MCNC: 2.8 MG/DL (ref 2.7–4.5)
PLATELET # BLD AUTO: 204 THOUSANDS/UL (ref 149–390)
PLATELET # BLD AUTO: 209 THOUSANDS/UL (ref 149–390)
PMV BLD AUTO: 10.3 FL (ref 8.9–12.7)
PMV BLD AUTO: 10.7 FL (ref 8.9–12.7)
POTASSIUM SERPL-SCNC: 3.9 MMOL/L (ref 3.5–5.3)
PR INTERVAL: 126 MS
PROCALCITONIN SERPL-MCNC: <0.05 NG/ML
PROT UR STRIP-MCNC: NEGATIVE MG/DL
QRS AXIS: 73 DEGREES
QRSD INTERVAL: 84 MS
QT INTERVAL: 362 MS
QTC INTERVAL: 459 MS
RBC # BLD AUTO: 4.36 MILLION/UL (ref 3.88–5.62)
RBC #/AREA URNS AUTO: ABNORMAL /HPF
S PNEUM AG UR QL: NEGATIVE
SODIUM SERPL-SCNC: 140 MMOL/L (ref 136–145)
SP GR UR STRIP.AUTO: 1.01 (ref 1–1.03)
T WAVE AXIS: 58 DEGREES
TRIGL SERPL-MCNC: 75 MG/DL
TROPONIN I SERPL-MCNC: 0.03 NG/ML
TROPONIN I SERPL-MCNC: 0.03 NG/ML
TROPONIN I SERPL-MCNC: <0.02 NG/ML
UROBILINOGEN UR QL STRIP.AUTO: 0.2 E.U./DL
VENTRICULAR RATE: 97 BPM
WBC # BLD AUTO: 7.08 THOUSAND/UL (ref 4.31–10.16)
WBC #/AREA URNS AUTO: ABNORMAL /HPF

## 2018-10-10 PROCEDURE — 94664 DEMO&/EVAL PT USE INHALER: CPT

## 2018-10-10 PROCEDURE — 97166 OT EVAL MOD COMPLEX 45 MIN: CPT

## 2018-10-10 PROCEDURE — 80048 BASIC METABOLIC PNL TOTAL CA: CPT | Performed by: NURSE PRACTITIONER

## 2018-10-10 PROCEDURE — 94760 N-INVAS EAR/PLS OXIMETRY 1: CPT

## 2018-10-10 PROCEDURE — G8979 MOBILITY GOAL STATUS: HCPCS

## 2018-10-10 PROCEDURE — 83735 ASSAY OF MAGNESIUM: CPT | Performed by: NURSE PRACTITIONER

## 2018-10-10 PROCEDURE — 97110 THERAPEUTIC EXERCISES: CPT

## 2018-10-10 PROCEDURE — 82948 REAGENT STRIP/BLOOD GLUCOSE: CPT

## 2018-10-10 PROCEDURE — 83605 ASSAY OF LACTIC ACID: CPT | Performed by: FAMILY MEDICINE

## 2018-10-10 PROCEDURE — 85049 AUTOMATED PLATELET COUNT: CPT | Performed by: NURSE PRACTITIONER

## 2018-10-10 PROCEDURE — 99232 SBSQ HOSP IP/OBS MODERATE 35: CPT | Performed by: FAMILY MEDICINE

## 2018-10-10 PROCEDURE — 94762 N-INVAS EAR/PLS OXIMTRY CONT: CPT

## 2018-10-10 PROCEDURE — G8978 MOBILITY CURRENT STATUS: HCPCS

## 2018-10-10 PROCEDURE — 84100 ASSAY OF PHOSPHORUS: CPT | Performed by: NURSE PRACTITIONER

## 2018-10-10 PROCEDURE — 93010 ELECTROCARDIOGRAM REPORT: CPT | Performed by: INTERNAL MEDICINE

## 2018-10-10 PROCEDURE — 80061 LIPID PANEL: CPT | Performed by: NURSE PRACTITIONER

## 2018-10-10 PROCEDURE — G8987 SELF CARE CURRENT STATUS: HCPCS

## 2018-10-10 PROCEDURE — 94640 AIRWAY INHALATION TREATMENT: CPT

## 2018-10-10 PROCEDURE — G8988 SELF CARE GOAL STATUS: HCPCS

## 2018-10-10 PROCEDURE — 87081 CULTURE SCREEN ONLY: CPT | Performed by: NURSE PRACTITIONER

## 2018-10-10 PROCEDURE — 85025 COMPLETE CBC W/AUTO DIFF WBC: CPT | Performed by: NURSE PRACTITIONER

## 2018-10-10 PROCEDURE — 81001 URINALYSIS AUTO W/SCOPE: CPT | Performed by: NURSE PRACTITIONER

## 2018-10-10 PROCEDURE — 87449 NOS EACH ORGANISM AG IA: CPT | Performed by: NURSE PRACTITIONER

## 2018-10-10 PROCEDURE — 83605 ASSAY OF LACTIC ACID: CPT | Performed by: NURSE PRACTITIONER

## 2018-10-10 PROCEDURE — 83036 HEMOGLOBIN GLYCOSYLATED A1C: CPT | Performed by: NURSE PRACTITIONER

## 2018-10-10 PROCEDURE — 84145 PROCALCITONIN (PCT): CPT | Performed by: NURSE PRACTITIONER

## 2018-10-10 PROCEDURE — 84484 ASSAY OF TROPONIN QUANT: CPT | Performed by: NURSE PRACTITIONER

## 2018-10-10 PROCEDURE — 97163 PT EVAL HIGH COMPLEX 45 MIN: CPT

## 2018-10-10 RX ORDER — MAGNESIUM SULFATE HEPTAHYDRATE 40 MG/ML
2 INJECTION, SOLUTION INTRAVENOUS ONCE
Status: COMPLETED | OUTPATIENT
Start: 2018-10-10 | End: 2018-10-10

## 2018-10-10 RX ORDER — LANOLIN ALCOHOL/MO/W.PET/CERES
3 CREAM (GRAM) TOPICAL
Status: DISCONTINUED | OUTPATIENT
Start: 2018-10-10 | End: 2018-10-11 | Stop reason: HOSPADM

## 2018-10-10 RX ORDER — LORAZEPAM 2 MG/ML
2 INJECTION INTRAMUSCULAR ONCE
Status: COMPLETED | OUTPATIENT
Start: 2018-10-10 | End: 2018-10-10

## 2018-10-10 RX ORDER — METHYLPREDNISOLONE SODIUM SUCCINATE 40 MG/ML
20 INJECTION, POWDER, LYOPHILIZED, FOR SOLUTION INTRAMUSCULAR; INTRAVENOUS EVERY 8 HOURS SCHEDULED
Status: DISCONTINUED | OUTPATIENT
Start: 2018-10-10 | End: 2018-10-11 | Stop reason: HOSPADM

## 2018-10-10 RX ORDER — MAGNESIUM SULFATE HEPTAHYDRATE 40 MG/ML
2 INJECTION, SOLUTION INTRAVENOUS ONCE
Status: COMPLETED | OUTPATIENT
Start: 2018-10-10 | End: 2018-10-11

## 2018-10-10 RX ORDER — IPRATROPIUM BROMIDE AND ALBUTEROL SULFATE 2.5; .5 MG/3ML; MG/3ML
3 SOLUTION RESPIRATORY (INHALATION) EVERY 4 HOURS PRN
Status: DISCONTINUED | OUTPATIENT
Start: 2018-10-10 | End: 2018-10-11 | Stop reason: HOSPADM

## 2018-10-10 RX ADMIN — AMLODIPINE BESYLATE 5 MG: 5 TABLET ORAL at 08:37

## 2018-10-10 RX ADMIN — FOLIC ACID 1 MG: 1 TABLET ORAL at 00:12

## 2018-10-10 RX ADMIN — Medication 1 TABLET: at 00:12

## 2018-10-10 RX ADMIN — METOPROLOL TARTRATE 50 MG: 50 TABLET ORAL at 08:37

## 2018-10-10 RX ADMIN — LORAZEPAM 2 MG: 2 INJECTION INTRAMUSCULAR; INTRAVENOUS at 00:29

## 2018-10-10 RX ADMIN — CHLORDIAZEPOXIDE HYDROCHLORIDE 50 MG: 25 CAPSULE ORAL at 23:53

## 2018-10-10 RX ADMIN — CHLORDIAZEPOXIDE HYDROCHLORIDE 50 MG: 25 CAPSULE ORAL at 12:20

## 2018-10-10 RX ADMIN — IPRATROPIUM BROMIDE AND ALBUTEROL SULFATE 3 ML: .5; 3 SOLUTION RESPIRATORY (INHALATION) at 02:48

## 2018-10-10 RX ADMIN — NICOTINE 1 PATCH: 21 PATCH, EXTENDED RELEASE TRANSDERMAL at 08:35

## 2018-10-10 RX ADMIN — INSULIN LISPRO 1 UNITS: 100 INJECTION, SOLUTION INTRAVENOUS; SUBCUTANEOUS at 02:08

## 2018-10-10 RX ADMIN — IPRATROPIUM BROMIDE AND ALBUTEROL SULFATE 3 ML: .5; 3 SOLUTION RESPIRATORY (INHALATION) at 19:35

## 2018-10-10 RX ADMIN — DIBASIC SODIUM PHOSPHATE, MONOBASIC POTASSIUM PHOSPHATE AND MONOBASIC SODIUM PHOSPHATE 3 TABLET: 852; 155; 130 TABLET ORAL at 03:00

## 2018-10-10 RX ADMIN — SODIUM CHLORIDE 1000 ML: 0.9 INJECTION, SOLUTION INTRAVENOUS at 17:57

## 2018-10-10 RX ADMIN — IPRATROPIUM BROMIDE AND ALBUTEROL SULFATE 3 ML: .5; 3 SOLUTION RESPIRATORY (INHALATION) at 14:11

## 2018-10-10 RX ADMIN — SODIUM CHLORIDE 1000 ML: 0.9 INJECTION, SOLUTION INTRAVENOUS at 14:10

## 2018-10-10 RX ADMIN — Medication 1 TABLET: at 08:37

## 2018-10-10 RX ADMIN — ENOXAPARIN SODIUM 40 MG: 40 INJECTION SUBCUTANEOUS at 08:36

## 2018-10-10 RX ADMIN — CEFTRIAXONE 1000 MG: 1 INJECTION, SOLUTION INTRAVENOUS at 12:15

## 2018-10-10 RX ADMIN — METHYLPREDNISOLONE SODIUM SUCCINATE 40 MG: 40 INJECTION, POWDER, FOR SOLUTION INTRAMUSCULAR; INTRAVENOUS at 02:09

## 2018-10-10 RX ADMIN — IPRATROPIUM BROMIDE AND ALBUTEROL SULFATE 3 ML: .5; 3 SOLUTION RESPIRATORY (INHALATION) at 07:31

## 2018-10-10 RX ADMIN — FAMOTIDINE 20 MG: 20 TABLET ORAL at 17:12

## 2018-10-10 RX ADMIN — SERTRALINE HYDROCHLORIDE 50 MG: 50 TABLET ORAL at 08:36

## 2018-10-10 RX ADMIN — METHYLPREDNISOLONE SODIUM SUCCINATE 20 MG: 40 INJECTION, POWDER, FOR SOLUTION INTRAMUSCULAR; INTRAVENOUS at 14:49

## 2018-10-10 RX ADMIN — PIPERACILLIN SODIUM AND TAZOBACTAM SODIUM 3.38 G: 36; 4.5 INJECTION, POWDER, FOR SOLUTION INTRAVENOUS at 08:41

## 2018-10-10 RX ADMIN — SODIUM CHLORIDE 125 ML/HR: 0.9 INJECTION, SOLUTION INTRAVENOUS at 20:30

## 2018-10-10 RX ADMIN — SODIUM CHLORIDE 1000 ML: 0.9 INJECTION, SOLUTION INTRAVENOUS at 19:04

## 2018-10-10 RX ADMIN — SODIUM CHLORIDE 1000 ML: 0.9 INJECTION, SOLUTION INTRAVENOUS at 01:15

## 2018-10-10 RX ADMIN — FAMOTIDINE 20 MG: 20 TABLET ORAL at 08:37

## 2018-10-10 RX ADMIN — RANOLAZINE 500 MG: 500 TABLET, FILM COATED, EXTENDED RELEASE ORAL at 17:12

## 2018-10-10 RX ADMIN — SODIUM CHLORIDE 500 ML: 0.9 INJECTION, SOLUTION INTRAVENOUS at 03:13

## 2018-10-10 RX ADMIN — Medication 100 MG: at 00:14

## 2018-10-10 RX ADMIN — INSULIN LISPRO 1 UNITS: 100 INJECTION, SOLUTION INTRAVENOUS; SUBCUTANEOUS at 22:34

## 2018-10-10 RX ADMIN — RANOLAZINE 500 MG: 500 TABLET, FILM COATED, EXTENDED RELEASE ORAL at 08:36

## 2018-10-10 RX ADMIN — METHYLPREDNISOLONE SODIUM SUCCINATE 20 MG: 40 INJECTION, POWDER, FOR SOLUTION INTRAMUSCULAR; INTRAVENOUS at 22:33

## 2018-10-10 RX ADMIN — AZITHROMYCIN MONOHYDRATE 500 MG: 500 INJECTION, POWDER, LYOPHILIZED, FOR SOLUTION INTRAVENOUS at 12:56

## 2018-10-10 RX ADMIN — ASPIRIN 81 MG: 81 TABLET, COATED ORAL at 08:36

## 2018-10-10 RX ADMIN — MAGNESIUM SULFATE HEPTAHYDRATE 2 G: 40 INJECTION, SOLUTION INTRAVENOUS at 09:47

## 2018-10-10 RX ADMIN — FOLIC ACID 1 MG: 1 TABLET ORAL at 08:37

## 2018-10-10 RX ADMIN — ATORVASTATIN CALCIUM 40 MG: 40 TABLET, FILM COATED ORAL at 17:12

## 2018-10-10 RX ADMIN — MAGNESIUM SULFATE HEPTAHYDRATE 2 G: 40 INJECTION, SOLUTION INTRAVENOUS at 01:22

## 2018-10-10 RX ADMIN — Medication 100 MG: at 08:36

## 2018-10-10 RX ADMIN — INSULIN LISPRO 2 UNITS: 100 INJECTION, SOLUTION INTRAVENOUS; SUBCUTANEOUS at 12:17

## 2018-10-10 RX ADMIN — CHLORDIAZEPOXIDE HYDROCHLORIDE 50 MG: 25 CAPSULE ORAL at 03:09

## 2018-10-10 RX ADMIN — MELATONIN TAB 3 MG 3 MG: 3 TAB at 22:32

## 2018-10-10 RX ADMIN — CHLORDIAZEPOXIDE HYDROCHLORIDE 50 MG: 25 CAPSULE ORAL at 08:36

## 2018-10-10 RX ADMIN — FLUTICASONE FUROATE AND VILANTEROL TRIFENATATE 1 PUFF: 200; 25 POWDER RESPIRATORY (INHALATION) at 08:37

## 2018-10-10 RX ADMIN — CLOPIDOGREL BISULFATE 75 MG: 75 TABLET ORAL at 08:36

## 2018-10-10 RX ADMIN — CHLORDIAZEPOXIDE HYDROCHLORIDE 50 MG: 25 CAPSULE ORAL at 14:45

## 2018-10-10 RX ADMIN — INSULIN LISPRO 1 UNITS: 100 INJECTION, SOLUTION INTRAVENOUS; SUBCUTANEOUS at 17:13

## 2018-10-10 RX ADMIN — CHLORDIAZEPOXIDE HYDROCHLORIDE 50 MG: 25 CAPSULE ORAL at 19:52

## 2018-10-10 NOTE — ASSESSMENT & PLAN NOTE
History of smoking 1 to 2 ppd  Started at a very young age, 17years old Patient on [facility]      · Continue with Advair  · Solu-Medrol IV initiated in the ED, continue with tapering dose  · Neb treatment  · Monitor pulse ox

## 2018-10-10 NOTE — ASSESSMENT & PLAN NOTE
History of smoking 1 to 2 ppd  Started at a very young age, 17years old    Patient on Advair at home  · Patient was on Breo here and started on IV Solu-Medrol

## 2018-10-10 NOTE — ASSESSMENT & PLAN NOTE
Phosphate level 1 7 likely from alcohol abuse  · Repeated with [facility] per pharmacist recommendation given patient can take p o  meds  · Repeat phosphate in a m

## 2018-10-10 NOTE — ASSESSMENT & PLAN NOTE
Admits to drinking bottle of vodka every day for months    Ethanol level less than 3   · CIWA protocol initiated  · Librium protocol  · Folic acid/Thiamine started  · IV hydration  · Seizure precaution  · Prevent withdrawal symptoms  · Alcohol counseling

## 2018-10-10 NOTE — ASSESSMENT & PLAN NOTE
Admits to drinking bottle of vodka every day for months  Ethanol level less than 3   · CIWA protocol initiated  Patient was started on Librium taper, multivitamin, Folic acid, Thiamine  · Alcohol cessation discussed with patient    He was not interested in seeing psychiatry or outpatient counseling

## 2018-10-10 NOTE — ASSESSMENT & PLAN NOTE
History of CABG 2004  Stent in 2014  Patient's Cardiologist is from [place], [place]    Patient on ASA/Plavix and Ranexa  Initial Troponin 0 03  · Trend troponin  · Continue ASA/Plavix/Ranexa  · Telemetry/monitor

## 2018-10-10 NOTE — H&P
H&P- Lamar Painter 1960, 66 y o  male MRN: [id number]    Unit/Bed#: [address] [place] [address] Encounter: [id number]    Primary Care Provider: Bianca Harris   Date and time admitted to hospital: 10/9/2018  5:50 PM        * Right lower lobe pneumonia [facility])   Assessment & Plan    Checks x-ray consistent with right lower lobe pneumonia  Patient with consistent cough  Blood cultures and Zosyn IV started in the ED  · Check blood cultures  · Continue neb treatment  · Continue IV antibiotics  · Check strep pneumonia and Legionella urine  · Check procalcitonin level  · Check sputum culture     Lactic acidosis   Assessment & Plan    LA likely from decrease clearance to the liver due to his alcohol abuse  S/P 1L NS in the ED  · Trend lactic acid till normalize  · Hold Metformin  · IV fluids per LA result  · IV maintenance     Hypomagnesemia   Assessment & Plan    Mag level 1 0 likely from alcohol abuse  · Repleted with 2 gram Mg IV  · Check Mg level at 0600  · Monitor/telemetry     Low phosphate levels   Assessment & Plan    Phosphate level 1 7 likely from alcohol abuse  · Repeated with K-Phos per pharmacist recommendation given patient can take p o  meds  · Repeat phosphate in a m  Chronic obstructive pulmonary disease with acute exacerbation ([facility] [address] )   Assessment & Plan    History of smoking 1 to 2 ppd  Started at a very young age, 17years old Patient on Konrad Larch  · Continue with Advair  · Solu-Medrol IV initiated in the ED, continue with tapering dose  · Neb treatment  · Monitor pulse ox     HLD (hyperlipidemia)   Assessment & Plan    Patient on Rosuvastatin  · Continue with Atorvastatin  · Check lipid profile     Hx of CABG   Assessment & Plan    History of CABG 2004  Stent in 2014  Patient's Cardiologist is from [place], [place]    Patient on ASA/Plavix and Ranexa  Initial Troponin 0 03  · Trend troponin  · Continue ASA/Plavix/Ranexa  · Telemetry/monitor         Depression   Assessment & Plan    Patient on Zoloft  · Continue     Essential (primary) hypertension   Assessment & Plan    Initially hypertensive  Patient on beta-blocker/CCB  · Continue Metoprolol/Amlodipine  · Monitor BP     Type 2 diabetes mellitus ([facility] [address] )   Assessment & Plan    No results found for: HGBA1C    Recent Labs      10/10/18   0140   POCGLU  150*       Blood Sugar Average: Last 72 hrs:  (P) 150   Hold Metformin due to lactic acidosis  SSI ALG1  · Fingerstick AC/HS  · Check hemoglobin A1c     Alcohol abuse   Assessment & Plan    Admits to drinking bottle of vodka every day for months  Ethanol level less than 3   · CIWA protocol initiated  · Librium protocol  · Folic acid/Thiamine started  · IV hydration  · Seizure precaution  · Prevent withdrawal symptoms  · Alcohol counseling                 VTE Prophylaxis: Enoxaparin (Lovenox)  / sequential compression device   Code Status: Level 1 - Full Code    Anticipated Length of Stay:  Patient will be admitted on an Inpatient basis with an anticipated length of stay of  > 2 midnights  Justification for Hospital Stay:  IV antibiotics, IV hydration, CIWA protocol  Total Time for Visit, including Counseling / Coordination of Care: 1 hour  Greater than 50% of this total time spent on direct patient counseling and coordination of care  Chief Complaint:   Cough (Was diagnosed with pneumonia on 10/4  was started on moxifloxacin but patient stats he doesn't feel better, states "I just don't feel right" patient is shakey and admits to drinking a 5th of vodka last night)      History of Present Illness:    Sujata Jasso is a 66 y o  male with a PMH of CABG x1 and stent, HTN, HLD, COPD, prostate CA, type 2 diabetes who presents with cough and not feeling good  Patient was seen by his PMD on the 4th for regular checkup and told his PMD that he is "just not feeling right" with cough, upset stomach, headache, and is short of breath  He was given prescription of Moxifloxacin for possible pneumonia  Patient is still not any better and continued to have the above symptoms  He was using his inhalers with no relief  On top of this, he has been drinking bottle of vodka daily  He has a history of alcoholism in the past and has not been drinking for the past 3 years  He admits that he has been drinking lately for the past few months, he denies being depress, suicidal nor homicidal   He admits to be having visual hallucination while he was at home but is resolved now  He denies auditory hallucination  He is jittery on exam  His ethanol level is less than 3  His lactic acid was 2 9 on admission and was given a liter of IV fluids  He has mild leukocytosis  He still has non-productive cough, described as yellow in color  He denies to chest pain/chest tightness  He stated that his breathing is much better since he was given nebulizer and steroid in the ED  His chest x-ray is consistent with pneumonia, blood culture x2 was done, and Zosyn IV was initiated in the ED  Patient is then admitted to our service  Review of Systems:    Review of Systems   Constitutional: Positive for activity change and diaphoresis  Negative for appetite change  HENT: Negative for congestion, ear pain, sinus pain and sore throat  Respiratory: Positive for cough and shortness of breath  Negative for chest tightness  Cardiovascular: Negative for chest pain, palpitations and leg swelling  Gastrointestinal: Negative for abdominal distention, abdominal pain, constipation, diarrhea, nausea and vomiting  Genitourinary: Negative for decreased urine volume, difficulty urinating, dysuria, flank pain, frequency, hematuria and urgency  Musculoskeletal: Positive for back pain  Negative for arthralgias and myalgias  Skin: Negative for color change, pallor, rash and wound  Neurological: Negative for tremors, seizures, syncope, weakness, light-headedness, numbness and headaches  Psychiatric/Behavioral: Negative for agitation   The patient is not nervous/anxious  Past Medical and Surgical History:     Past Medical History:   Diagnosis Date    Cancer [facility])     prostate ca,had radiation    Cardiac disease     stents,then triple bypass    COPD (chronic obstructive pulmonary disease) ([facility] )     Diabetes mellitus ([facility] )     Hx of heart artery stent     2014    Hyperlipidemia     Hypertension     Prostate CA ([facility] )     Renal disorder     pyelonephritis    S/P CABG x 1     2004       Past Surgical History:   Procedure Laterality Date    TONSILLECTOMY         Meds/Allergies:    Prior to Admission medications    Medication Sig Start Date End Date Taking?  Authorizing Provider   amLODIPine (NORVASC) 5 mg tablet Take 5 mg by mouth daily    Historical Provider, MD   aspirin (ECOTRIN LOW STRENGTH) 81 mg EC tablet Take 81 mg by mouth daily    Historical Provider, MD   baclofen 10 mg tablet Take 1 tablet (10 mg total) by mouth 3 (three) times a day for 3 days 4/27/18 4/30/18  Vane Lara PA-C   clopidogrel (PLAVIX) 75 mg tablet Take 75 mg by mouth daily    Historical Provider, MD   famotidine (PEPCID) 20 mg tablet Take 20 mg by mouth 2 (two) times a day    Historical Provider, MD   fluticasone-salmeterol (ADVAIR) 500-50 mcg/dose Inhale 1 puff every 12 (twelve) hours    Historical Provider, MD   gabapentin (NEURONTIN) 300 mg capsule Take 100 mg by mouth 3 (three) times a day    Historical Provider, MD   lidocaine (LIDODERM) 5 % Place 1 patch on the skin daily for 10 days Remove & Discard patch within 12 hours or as directed by MD 4/27/18 5/7/18  Vane Lara PA-C   lidocaine (XYLOCAINE) 2 % topical gel Apply 1 application topically 3 (three) times a day for 10 days 4/27/18 5/7/18  Vane Lara PA-C   metFORMIN (GLUCOPHAGE) 1000 MG tablet Take 1,000 mg by mouth 2 (two) times a day with meals    Historical Provider, MD   metoprolol tartrate (LOPRESSOR) 25 mg tablet Take 25 mg by mouth every 12 (twelve) hours    Historical Provider, MD   naltrexone (REVIA) 50 mg tablet Take 50 mg by mouth daily    Historical Provider, MD   naproxen (NAPROSYN) 500 mg tablet Take 1 tablet (500 mg total) by mouth 2 (two) times a day with meals 4/18/18   Isamar Beckett MD   naproxen sodium (ALEVE) 220 MG tablet Take 220 mg by mouth every 12 (twelve) hours as needed for mild pain    Historical Provider, MD   ranolazine (RANEXA) 500 mg 12 hr tablet Take 500 mg by mouth 2 (two) times a day    Historical Provider, MD   rosuvastatin (CRESTOR) 20 MG tablet Take 20 mg by mouth daily    Historical Provider, MD   sertraline (ZOLOFT) 100 mg tablet Take 50 mg by mouth daily    Historical Provider, MD       Allergies: No Known Allergies    Social History:     Marital Status: Single   Substance Use History:   History   Alcohol Use    Yes     Comment: beer 6 pack a day, bottle of vodka daily     History   Smoking Status    Current Every Day Smoker    Packs/day: 1 50   Smokeless Tobacco    Never Used     History   Drug Use No       Family History: Mother and Father- CAD    Physical Exam:     Vitals:   Blood Pressure: (!) 175/85 (10/10/18 0400)  Pulse: 85 (10/10/18 0400)  Temperature: 97 9 °F (36 6 °C) (10/09/18 2343)  Temp Source: Temporal (10/09/18 2343)  Respirations: 20 (10/09/18 2343)  Height: 6' 2" (188 cm) (10/09/18 2204)  Weight - Scale: 103 kg (227 lb 1 2 oz) (10/09/18 2319)  SpO2: 93 % (10/10/18 0500)    Physical Exam   Constitutional: He is oriented to person, place, and time  He appears well-developed and well-nourished  HENT:   Head: Normocephalic and atraumatic  Neck: Normal range of motion  Neck supple  Cardiovascular: Normal rate, regular rhythm and normal heart sounds  S1-S2 noted   Pulmonary/Chest: Effort normal and breath sounds normal  No respiratory distress  He has no wheezes  He has no rales  Abdominal: Soft  Bowel sounds are normal  He exhibits no distension  There is no tenderness  There is no rebound     Musculoskeletal: Normal range of motion  He exhibits no edema or deformity  Neurological: He is alert and oriented to person, place, and time  Skin: Skin is warm and dry  Facial ruddiness and purple lips noted  Additional Data:     Lab Results: I have personally reviewed pertinent reports  Results from last 7 days  Lab Units 10/10/18  0507   WBC Thousand/uL 7 08   HEMOGLOBIN g/dL 13 8   HEMATOCRIT % 41 7   PLATELETS Thousands/uL 209   NEUTROS PCT % 90*   LYMPHS PCT % 7*   MONOS PCT % 1*   EOS PCT % 0       Results from last 7 days  Lab Units 10/10/18  0507 10/09/18  1849   SODIUM mmol/L 140 136   POTASSIUM mmol/L 3 9 4 4   CHLORIDE mmol/L 101 100   CO2 mmol/L 27 26   BUN mg/dL 10 10   CREATININE mg/dL 0 89 0 81   CALCIUM mg/dL 7 9* 8 2*   ALK PHOS U/L  --  74   ALT U/L  --  35   AST U/L  --  43       Results from last 7 days  Lab Units 10/09/18  1849   INR  1 05       Imaging: I have personally reviewed pertinent reports  XR chest 2 views   Final Result by Benny Ardon MD (10/09 2224)   Addendum 1 of 1 by Benny Ardon MD (10/09 2227)   ADDENDUM:      There is slightly increased patchy opacity within the right lower lobe,    concerning for developing pneumonia  Follow-up after clinical treatment    recommended to ensure resolution  Final      No acute cardiopulmonary disease  Workstation performed: [id number]             XR chest 2 views   Final Result   Addendum 1 of 1   ADDENDUM:      There is slightly increased patchy opacity within the right lower lobe,    concerning for developing pneumonia  Follow-up after clinical treatment    recommended to ensure resolution  Final      No acute cardiopulmonary disease              Workstation performed: [id number]             EKG, Pathology, and Other Studies Reviewed on Admission:   · EKG: RSR, rate of 97 with non-specific ST abnormality    Allscripts / Epic Records Reviewed: Yes     ** Please Note: This note has been constructed using a voice recognition system   **

## 2018-10-10 NOTE — ASSESSMENT & PLAN NOTE
LA likely from decrease clearance to the liver due to his alcohol abuse   S/P 1L NS in the ED  · Trend lactic acid till normalize  · Hold Metformin  · IV fluids per LA result  · IV maintenance

## 2018-10-10 NOTE — PROGRESS NOTES
Trending lactic acid 3 0>>3 5>>3 3>>2 9  Patient received total of 3 5L of IV normal saline and is now receiving NS at 75 mL/hour  Repeat lactic acid at 0700 and will continue until normalizes

## 2018-10-10 NOTE — SOCIAL WORK
DASH discussion completed  Discussed goals of making sure pt's needs are met upon discharge, pt's preferences are taken into account, pt understands her health condition, medications and symptoms to watch for after returning home and pt is aware of any follow up appointments recommended by hospital physician  DESIREE spoke with the pt at the bedside  Pt lives alone in his own apartment with an elevator to his floor  Pt noted that he no DME or HHC at this time  Pt did not that the apartment was going to be working on [employer] in November so he may stay with his sister or in his mother's apartment but was not yet sure of his disposition  Pt thought he may need a neb for home when he leaves  Pt does drive and uses the [employer] in [place], [place]  Noted he signed on with [facility] and is supposed to work have a visit from them for intake and noted that the [address] person in his building was assisting him with access to PADD and poss Medicaid but he can't remember if it was both or one or the other  Pt will receive information for Pt financial Assist to follow up when he has a bill come in and they can assist with coverage and poss Medicaid follow up  Pt thought he may need a neb machine, COPD education was done last night for the COPD program, RT to reinforce education tonight  CM to continue to follow for poss neb machine and other DCP needs  Pt is on the MBP list, d/w him and MBP booklet provided

## 2018-10-10 NOTE — ASSESSMENT & PLAN NOTE
· Chest x-ray consistent with right lower lobe pneumonia  · Patient with no risk factors for aspiration pneumonia  denies any recent hospitalization  Denies any vomiting, trouble swallowing    · He was initially started on IV Zosyn which was later discontinued and then started on IV Rocephin and Zithromax  · blood cultures negative so far     · Urine strep pneumonia and Legionella negative  · Procalcitonin level < 0 05

## 2018-10-10 NOTE — ASSESSMENT & PLAN NOTE
LA likely from decrease clearance to the liver due to his alcohol abuse  Possibly from nebulizer treatments as well  Received fluid boluses   Resolved on repeat labwork  · Metformin was held here

## 2018-10-10 NOTE — ASSESSMENT & PLAN NOTE
Lab Results   Component Value Date    HGBA1C 6 0 10/10/2018       Recent Labs      10/10/18   1100  10/10/18   1605  10/10/18   1959  10/11/18   0714   POCGLU  254*  151*  164*  158*     Metformin was held here due to lactic acidosis

## 2018-10-10 NOTE — CASE MANAGEMENT
Initial Clinical Review    Admission: Date/Time/Statement: 10/9/18 @ 2117     Orders Placed This Encounter   Procedures    Inpatient Admission (expected length of stay for this patient is greater than two midnights)     Standing Status:   Standing     Number of Occurrences:   1     Order Specific Question:   Admitting Physician     Answer:   Salma Barrios     Order Specific Question:   Level of Care     Answer:   Med Surg [16]     Order Specific Question:   Estimated length of stay     Answer:   More than 2 Midnights     Order Specific Question:   Certification     Answer:   I certify that inpatient services are medically necessary for this patient for a duration of greater than two midnights  See H&P and MD Progress Notes for additional information about the patient's course of treatment  ED: Date/Time/Mode of Arrival:   ED Arrival Information     Expected Arrival Acuity Means of Arrival Escorted By Service Admission Type    - 10/9/2018 17:45 Urgent Walk-In Self Hospitalist Urgent    Arrival Complaint    Cough           Chief Complaint:   Chief Complaint   Patient presents with    Cough     Was diagnosed with pneumonia on 10/4  was started on moxifloxacin but patient stats he doesn't feel better, states "I just don't feel right" patient is shakey and admits to drinking a 5th of vodka last night       History of Illness: Sotero Alvarez is a 66 y o  male with a PMH of CABG x1 and stent, HTN, HLD, COPD, prostate CA, type 2 diabetes who presents with cough and not feeling good  Patient was seen by his PMD on the 4th for regular checkup and told his PMD that he is "just not feeling right" with cough, upset stomach, headache, and is short of breath  He was given prescription of Moxifloxacin for possible pneumonia  Patient is still not any better and continued to have the above symptoms  He was using his inhalers with no relief  On top of this, he has been drinking bottle of vodka daily    He has a history of alcoholism in the past and has not been drinking for the past 3 years  He admits that he has been drinking lately for the past few months, he denies being depress, suicidal nor homicidal   He admits to be having visual hallucination while he was at home but is resolved now  He denies auditory hallucination  He is jittery on exam  His ethanol level is less than 3  His lactic acid was 2 9 on admission and was given a liter of IV fluids  He has mild leukocytosis  He still has non-productive cough, described as yellow in color  He denies to chest pain/chest tightness  He stated that his breathing is much better since he was given nebulizer and steroid in the ED  His chest x-ray is consistent with pneumonia, blood culture x2 was done, and Zosyn IV was initiated in the ED  Patient is then admitted to our service  ED Vital Signs:   ED Triage Vitals   Temperature Pulse Respirations Blood Pressure SpO2   10/09/18 1757 10/09/18 1757 10/09/18 1757 10/09/18 1757 10/09/18 1757   98 8 °F (37 1 °C) (!) 107 18 (!) 159/101 95 %      Temp Source Heart Rate Source Patient Position - Orthostatic VS BP Location FiO2 (%)   10/09/18 1757 10/09/18 1757 10/09/18 1757 10/09/18 1757 --   Oral Monitor Lying Left arm       Pain Score       10/09/18 2234       No Pain        Wt Readings from Last 1 Encounters:   10/09/18 103 kg (227 lb 1 2 oz)     Abnormal Labs/Diagnostic Test Results: CA 8 2 ALB 3 2 PHOS 1 7 MG 1 0  LACTIC ACID 2 9,3 0 WBC 10 73  CXR No acute cardiopulmonary disease    ED Treatment:   Medication Administration from 10/09/2018 1745 to 10/09/2018 2204       Date/Time Order Dose Route Action Action by Comments     10/09/2018 2000 sodium chloride 0 9 % bolus 1,000 mL 0 mL Intravenous Stopped Donavan Goodman RN      10/09/2018 1848 sodium chloride 0 9 % bolus 1,000 mL 1,000 mL Intravenous [address] Donavan Goodman RN      10/09/2018 1901 ipratropium-albuterol (DUO-NEB) 0 5-2 5 mg/3 mL inhalation solution 3 mL 3 mL Nebulization Given Cirilo Hoff RN      10/09/2018 2145 piperacillin-tazobactam (ZOSYN) IVPB 3 375 g 0 g Intravenous Stopped Nikki Jimenez RN      10/09/2018 2114 piperacillin-tazobactam (ZOSYN) IVPB 3 375 g 3 375 g Intravenous [address] Cirilo Hoff RN      10/09/2018 2114 methylPREDNISolone sodium succinate (Solu-MEDROL) injection 125 mg 125 mg Intravenous Given Cirilo Hoff RN      10/09/2018 2115 ipratropium-albuterol (DUO-NEB) 0 5-2 5 mg/3 mL inhalation solution 3 mL 3 mL Nebulization Given Cirilo Hoff RN           Past Medical/Surgical History: Active Ambulatory Problems     Diagnosis Date Noted    No Active Ambulatory Problems     Resolved Ambulatory Problems     Diagnosis Date Noted    No Resolved Ambulatory Problems     Past Medical History:   Diagnosis Date    Cancer [facility])     Cardiac disease     [facility]chronic obstructive pulmonary disease) [facility] )     Diabetes mellitus ([facility] )     Hx of heart artery stent     Hyperlipidemia     Hypertension     Prostate CA ([facility] )     Renal disorder     S/P CABG x 1        Admitting Diagnosis: Cough [R05]  COPD (chronic obstructive pulmonary disease) (HCC) [J44 9]  Pneumonia [J18 9]    Age/Sex: 66 y o  male    Assessment/Plan:   Right lower lobe pneumonia ([facility] )   Assessment & Plan     Checks x-ray consistent with right lower lobe pneumonia  Patient with consistent cough  Blood cultures and Zosyn IV started in the ED  · Check blood cultures  · Continue neb treatment  · Continue IV antibiotics  · Check strep pneumonia and Legionella urine  · Check procalcitonin level  · Check sputum culture      Lactic acidosis   Assessment & Plan     LA likely from decrease clearance to the liver due to his alcohol abuse  S/P 1L NS in the ED  · Trend lactic acid till normalize  · Hold Metformin  · IV fluids per LA result  · IV maintenance      Hypomagnesemia   Assessment & Plan     Mag level 1 0 likely from alcohol abuse    · Repleted with 2 gram Mg IV  · Check Mg level at 0600  · Monitor/telemetry   Low phosphate levels   Assessment & Plan     Phosphate level 1 7 likely from alcohol abuse  · Repeated with K-Phos per pharmacist recommendation given patient can take p o  meds  · Repeat phosphate in a m       Chronic obstructive pulmonary disease with acute exacerbation ([facility] )   Assessment & Plan     History of smoking 1 to 2 ppd  Started at a very young age, 17years old Patient on [facility]  · Continue with Advair  · Solu-Medrol IV initiated in the ED, continue with tapering dose  · Neb treatment  · Monitor pulse ox   HLD (hyperlipidemia)   Assessment & Plan     Patient on Rosuvastatin  · Continue with Atorvastatin  · Check lipid profile   Hx of CABG   Assessment & Plan     History of CABG 2004  Stent in 2014  Patient's Cardiologist is from [place], [place]  Patient on ASA/Plavix and Ranexa  Initial Troponin 0 03  · Trend troponin  · Continue ASA/Plavix/Ranexa  · Telemetry/monitor      Depression   Assessment & Plan     Patient on Zoloft  · Continue   Essential (primary) hypertension   Assessment & Plan     Initially hypertensive  Patient on beta-blocker/CCB  · Continue Metoprolol/Amlodipine  · Monitor BP   Type 2 diabetes mellitus ([facility] )   Assessment & Plan     No results found for: HGBA1C         Recent Labs      10/10/18   0140   POCGLU  150*     Blood Sugar Average: Last 72 hrs:  (P) 150   Hold Metformin due to lactic acidosis  SSI ALG1  · Fingerstick AC/HS  · Check hemoglobin A1c      Alcohol abuse   Assessment & Plan     Admits to drinking bottle of vodka every day for months    Ethanol level less than 3   · CIWA protocol initiated  · Librium protocol  · Folic acid/Thiamine started  · IV hydration  · Seizure precaution  · Prevent withdrawal symptoms  · Alcohol counseling       VTE Prophylaxis: Enoxaparin (Lovenox)  / sequential compression device   Code Status: Level 1 - Full Code   Anticipated Length of Stay:  Patient will be admitted on an Inpatient basis with an anticipated length of stay of  > 2 midnights  Justification for Hospital Stay:  IV antibiotics, IV hydration, CIWA protocol        Admission Orders:  TELE MON  RESPIRATORY PROTOCOL  DAILY WEIGHT I/O  ELEVATE HOB 30 DEGREE  PT OT  SPUTUM GS CS  PHOS MG   LACTIC ACID BMP  Scheduled Meds:   Current Facility-Administered Medications:  acetaminophen 650 mg Oral Q6H PRN Darline S Edwards, CRNP    amLODIPine 5 mg Oral Daily Darline S Edwards, CRNP    aspirin 81 mg Oral Daily Darline S Edwards, CRNP    atorvastatin 40 mg Oral Daily With [employer], CRNP    azithromycin 500 mg Intravenous Q24H Brandy Revankar, DO Last Rate: 500 mg (10/10/18 1256)   cefTRIAXone 1,000 mg Intravenous Q24H Brandy Revankar, DO Last Rate: 1,000 mg (10/10/18 1215)   chlordiazePOXIDE 50 mg Oral Q4H Darline S Edwards, CRNP    Followed by        Soledad Mejia ON 10/11/2018] chlordiazePOXIDE 50 mg Oral Q6H [id number] [facility], CRNP    Followed by        Soledad Mejia ON 10/12/2018] chlordiazePOXIDE 25 mg Oral Q4H Darline S Edwards, CRNP    Followed by        Soledad Mejia ON 10/13/2018] chlordiazePOXIDE 25 mg Oral Q6H [address] Darline S Edwards, CRNP    clopidogrel 75 mg Oral Daily Darline S Edwards, CRNP    enoxaparin 40 mg Subcutaneous Daily Darline S Edwards, CRNP    famotidine 20 mg Oral BID Darline S Edwards, CRNP    fluticasone-vilanterol 1 puff Inhalation Daily Darline S Rut, CRNP    folic acid 1 mg Oral Daily Darline S Edwards, CRNP    insulin lispro 1-5 Units Subcutaneous HS Darline S Edwards, CRNP    insulin lispro 1-5 Units Subcutaneous TID AC Brandy Revankar, DO    ipratropium-albuterol 3 mL Nebulization Q6H Darline S Edwards, CRNP    ipratropium-albuterol 3 mL Nebulization Q4H PRN Brandy Revankar, DO    methylPREDNISolone sodium succinate 20 mg Intravenous Q8H [address] Brandy Revankar, DO    metoprolol tartrate 50 mg Oral Daily CHELSEA Rocha    multivitamin-minerals 1 tablet Oral Daily CHELSEA Zhou    nicotine 1 patch Transdermal Daily CHELSEA Rocha    ranolazine 500 mg Oral BID CHELSEA Rocha    sertraline 50 mg Oral Daily CHELSEA Rocha    sodium chloride 1,000 mL Intravenous Once Brandy Revankar, DO Last Rate: 1,000 mL (10/10/18 1410)   sodium chloride 125 mL/hr Intravenous Continuous Brandy Revankar, DO Last Rate: 125 mL/hr (10/10/18 0934)   thiamine 100 mg Oral Daily CHELSEA Rocha      Continuous Infusions:   sodium chloride 125 mL/hr Last Rate: 125 mL/hr (10/10/18 0934)     PRN Meds:   acetaminophen    ipratropium-albuterol

## 2018-10-10 NOTE — ASSESSMENT & PLAN NOTE
No results found for: HGBA1C    Recent Labs      10/10/18   0140   POCGLU  150*       Blood Sugar Average: Last 72 hrs:  (P) 150   Hold Metformin due to lactic acidosis    SSI ALG1  · Fingerstick AC/HS  · Check hemoglobin A1c

## 2018-10-10 NOTE — ASSESSMENT & PLAN NOTE
History of CABG 2004  Stent in 2014   Patient's Cardiologist is from [place], [place]     · Troponins negative  · On ASA, Lopressor, Plavix, statin, Ranexa

## 2018-10-10 NOTE — ASSESSMENT & PLAN NOTE
Checks x-ray consistent with right lower lobe pneumonia  Patient with consistent cough  Blood cultures and Zosyn IV started in the ED    · Check blood cultures  · Continue neb treatment  · Continue IV antibiotics  · Check strep pneumonia and Legionella urine  · Check procalcitonin level  · Check sputum culture

## 2018-10-10 NOTE — PROGRESS NOTES
[facility] Internal Medicine Progress Note  Patient: Addie Knight 66 y o  male   MRN: [id number]  PCP: Julieth García  Unit/Bed#: [address] [place] [address] Encounter: [id number]  Date Of Visit: 10/10/18    Problem List:    Principal Problem:    Right lower lobe pneumonia ([place] [address] )  Active Problems:    Alcohol withdrawal syndrome without complication (HCC)    Lactic acidosis    Chronic obstructive pulmonary disease with acute exacerbation ([place] [address] )    Type 2 diabetes mellitus ([place] [address] )    Essential (primary) hypertension    Hypomagnesemia    Low phosphate levels    Depression    Hx of CABG    HLD (hyperlipidemia)      Assessment & Plan:    * Right lower lobe pneumonia ([place] [address] )   Assessment & Plan    · Chest x-ray consistent with right lower lobe pneumonia  · Patient with no risk factors for aspiration pneumonia  denies any recent hospitalization  Denies any vomiting, trouble swallowing    · IV Zosyn discontinued and patient started on IV Rocephin and Zithromax  · blood cultures pending  Check respiratory pathogen profile  · Continue neb treatment  · Urine strep pneumonia and Legionella pending  · Procalcitonin level < 0 05     Lactic acidosis   Assessment & Plan    LA likely from decrease clearance to the liver due to his alcohol abuse  Possibly from nebulizer treatments as well  Received fluid boluses  · Trend lactic acid till normalize  · Holding Metformin  · Increased rate of IVF     Alcohol withdrawal syndrome without complication (HCC)   Assessment & Plan    Admits to drinking bottle of vodka every day for months  Ethanol level less than 3   · CIWA protocol initiated  · Continue with Librium protocol  · Continue multivitamin, Folic acid, Thiamine  · IV hydration  · Seizure precaution  · Alcohol cessation discussed with patient  He is interested in quitting but does not want any outpatient counseling     Chronic obstructive pulmonary disease with acute exacerbation ([place] [address] )   Assessment & Plan    History of smoking 1 to 2 ppd  Started at a very young age, 17years old  Patient on Advair at home  · Patient on Breo here  · Decreased dose of IV Solu-Medrol  · Neb treatment     HLD (hyperlipidemia)   Assessment & Plan    Continue Atorvastatin  · LDL of 124     Hx of CABG   Assessment & Plan    History of CABG 2004  Stent in   Patient's Cardiologist is from [place], [place]     · Troponins negative  · Continue ASA, Lopressor, Plavix, statin, Ranexa         Depression   Assessment & Plan    Continue Zoloft  Low phosphate levels   Assessment & Plan    likely from alcohol abuse  · Improved after repletion     Hypomagnesemia   Assessment & Plan    · Repleted  Recheck level in the a m  Essential (primary) hypertension   Assessment & Plan    Continue Norvasc, Metoprolol  · Monitor BP     Type 2 diabetes mellitus [facility])   Assessment & Plan    Lab Results   Component Value Date    HGBA1C 6 0 10/10/2018       Recent Labs      10/10/18   0140  10/10/18   0721   POCGLU  150*  198*     Holding Metformin due to lactic acidosis  Start patient on sliding scale insulin with meals  Continue bedtime sliding scale insulin           VTE Pharmacologic Prophylaxis:   Pharmacologic: Enoxaparin (Lovenox)  Mechanical VTE Prophylaxis in Place: Yes    Patient Centered Rounds: I have performed bedside rounds with nursing staff today  Discussions with Specialists or Other Care Team Provider: Yes    Education and Discussions with Family / Patient:Yes    Time Spent for Care: 30 minutes  More than 50% of total time spent on counseling and coordination of care as described above  Current Length of Stay: 1 day(s)    Current Patient Status: Inpatient     Discharge Plan: home    Code Status: Level 1 - Full Code    Certification Statement: The patient will continue to require additional inpatient hospital stay due to pneumonia, alcohol withdrawal      Subjective:   Feels less shaky today    Feels much better    Objective:     Vitals:   Temp (24hrs), Av 3 °F (36 8 °C), Min:97 8 °F (36 6 °C), Max:98 8 °F (37 1 °C)    Temp:  [97 8 °F (36 6 °C)-98 8 °F (37 1 °C)] 97 8 °F (36 6 °C)  HR:  [] 88  Resp:  [16-22] 18  BP: (142-202)/() 153/90  SpO2:  [91 %-95 %] 91 %  Body mass index is 29 15 kg/m²  Input and Output Summary (last 24 hours): Intake/Output Summary (Last 24 hours) at 10/10/18 1109  Last data filed at 10/10/18 0402   Gross per 24 hour   Intake             6296 ml   Output                0 ml   Net             6296 ml       Physical Exam:     Physical Exam   Constitutional: He is oriented to person, place, and time  He appears well-developed and well-nourished  Appears tremulous   HENT:   Head: Normocephalic and atraumatic  Eyes: EOM are normal  Right eye exhibits no discharge  Left eye exhibits no discharge  No scleral icterus  Neck: Neck supple  Cardiovascular: Normal rate and regular rhythm  Pulmonary/Chest: Effort normal  No respiratory distress  He has no wheezes  He has no rales  Decreased breath sounds bilaterally   Abdominal: Soft  Bowel sounds are normal  He exhibits no distension  There is no tenderness  Musculoskeletal: He exhibits no edema  Tremors noted of hands bilaterally   Neurological: He is alert and oriented to person, place, and time  No cranial nerve deficit  Skin: He is not diaphoretic  Psychiatric: He has a normal mood and affect         Additional Data:     Labs:      Results from last 7 days  Lab Units 10/10/18  0507   WBC Thousand/uL 7 08   HEMOGLOBIN g/dL 13 8   HEMATOCRIT % 41 7   PLATELETS Thousands/uL 209   NEUTROS PCT % 90*   LYMPHS PCT % 7*   MONOS PCT % 1*   EOS PCT % 0       Results from last 7 days  Lab Units 10/10/18  0507 10/09/18  1849   SODIUM mmol/L 140 136   POTASSIUM mmol/L 3 9 4 4   CHLORIDE mmol/L 101 100   CO2 mmol/L 27 26   BUN mg/dL 10 10   CREATININE mg/dL 0 89 0 81   CALCIUM mg/dL 7 9* 8 2*   ALK PHOS U/L  --  74   ALT U/L  --  35   AST U/L  --  43       Results from last 7 days  Lab Units 10/09/18  1849   INR  1 05       * I Have Reviewed All Lab Data Listed Above  * Additional Pertinent Lab Tests Reviewed: All Labs For Current Hospital Admission Reviewed    Imaging:  Xr Chest 2 Views    Addendum Date: 10/9/2018 Addendum:   ADDENDUM: There is slightly increased patchy opacity within the right lower lobe, concerning for developing pneumonia  Follow-up after clinical treatment recommended to ensure resolution  Result Date: 10/9/2018  Narrative: CHEST INDICATION:   cough  COMPARISON:  11/14/2007 EXAM PERFORMED/VIEWS:  XR CHEST PA & LATERAL  The frontal view was performed utilizing dual energy radiographic technique  FINDINGS:  Median sternotomy wires are intact  Cardiomediastinal silhouette appears unremarkable  The lungs are clear  No pneumothorax or pleural effusion  Osseous structures appear within normal limits for patient age  Impression: No acute cardiopulmonary disease   Workstation performed: [id number]     Imaging Reports Reviewed by myself    Cultures:   Blood Culture: No results found for: BLOODCX  Urine Culture: No results found for: URINECX  Sputum Culture: No components found for: SPUTUMCX  Wound Culture: No results found for: WOUNDCULT    Last 24 Hours Medication List:     Current Facility-Administered Medications:  acetaminophen 650 mg Oral Q6H PRN CHELSEA Rocha    amLODIPine 5 mg Oral Daily Darline Edwards, CRNP    aspirin 81 mg Oral Daily CHELSEA Rocha    atorvastatin 40 mg Oral Daily With [employer], MARIANNENP    azithromycin 500 mg Intravenous Q24H Brandy Revankar, DO    cefTRIAXone 1,000 mg Intravenous Q24H Brandy Revankar, DO    chlordiazePOXIDE 50 mg Oral Q4H CHELSEA Rocha    Followed by        Obi Bryant ON 10/11/2018] chlordiazePOXIDE 50 mg Oral Q6H [facility] CHELSEA Rocha    Followed by        Obi Bryant ON 10/12/2018] chlordiazePOXIDE 25 mg Oral Q4H CHELSEA Rocha    Followed by        Obi Bryant ON 10/13/2018] chlordiazePOXIDE 25 mg Oral Q6H [address] Darline S Edwards, CRNP    clopidogrel 75 mg Oral Daily Darline S Edwards, CRNP    enoxaparin 40 mg Subcutaneous Daily Darline S Edwards, CRNP    famotidine 20 mg Oral BID Darline S Edwards, CRNP    fluticasone-vilanterol 1 puff Inhalation Daily Darline S [place], CRNP    folic acid 1 mg Oral Daily Darline S Edwards, CRNP    insulin lispro 1-5 Units Subcutaneous HS Darline S Edwards, CRNP    insulin lispro 1-5 Units Subcutaneous TID AC Brandy Revankar, DO    ipratropium-albuterol 3 mL Nebulization Q6H Darline S Edwards, CRNP    ipratropium-albuterol 3 mL Nebulization Q4H PRN Brandy Revankar, DO    magnesium sulfate 2 g Intravenous Once Brandy Revankar, DO    methylPREDNISolone sodium succinate 20 mg Intravenous Q8H [address] Brandy Revankar, DO    metoprolol tartrate 50 mg Oral Daily Darline S Edwards, CRNP    multivitamin-minerals 1 tablet Oral Daily Darline S [place], CRNP    nicotine 1 patch Transdermal Daily Darline S Edwards, CRNP    ranolazine 500 mg Oral BID Darline S Edwards, CRNP    sertraline 50 mg Oral Daily Darline S Edwards, CRNP    sodium chloride 125 mL/hr Intravenous Continuous Brandy Revankar, DO Last Rate: 125 mL/hr (10/10/18 0934)   thiamine 100 mg Oral Daily CHELSEA Guerrero         Today, Patient Was Seen By: Carmen Alvarado DO    ** Please Note: "This note has been constructed using a voice recognition system  Therefore there may be syntax, spelling, and/or grammatical errors   Please call if you have any questions  "**

## 2018-10-10 NOTE — PHYSICAL THERAPY NOTE
PT EVALUATION   10/10/18 1140   Pain Assessment   Pain Assessment No/denies pain   Home Living   Type of [address] One level;Elevator  (pt reports elevator "down" at times and does stair climibing)   Home Equipment (none)   Additional Comments patient independent without assitive device and required to climb 5 flights of stairs at times if elevator not functioniing   Prior Function   Level of [place] Independent with ADLs and functional mobility   Lives With Alone   Receives Help From Friend(s)   ADL Assistance Independent   IADLs Independent   Comments patient reports alcohol issues limiting independence at times   Restrictions/Precautions   Other Precautions Fall Risk   General   Additional Pertinent History chart reviewed, patient admitted with cough, headache, "not feeling well", pneumonia   Family/Caregiver Present No   Cognition   Overall Cognitive Status WFL   Arousal/Participation Cooperative   Attention Within functional limits   Orientation Level Oriented X4   Following Commands Follows multistep commands with increased time or repetition   Comments patient distracted at times and impulsive   RLE Assessment   RLE Assessment (ROM WFL, strength 4-/5)   LLE Assessment   LLE Assessment (ROM WFL,s trength 4-/5)   Coordination   Movements are Fluid and Coordinated 0   Bed Mobility   Supine to Sit 7  Independent   Sit to Supine 7  Independent   Transfers   Sit to Stand 5  Supervision  (verbal cuing for safety awareness and slowing technigue)   Stand to Sit 5  Supervision   Ambulation/Elevation   Gait Assistance (supervision to min assist at times with change in direction)   Additional items Assist x 1;Verbal cues; Tactile cues   Assistive Device (none)   Distance 25 feet with numerous changes in direction with balance loss at times on directional changes, unsteady at times and required wall assist or furniture assist at times for recovery   patient somewhat impulsive at times as well Balance   Static Sitting Good   Dynamic Sitting Fair +   Static Standing Fair -   Dynamic Standing Fair -   Ambulatory Fair -   Higher level balance (poor dynamic standing with higher balance level testing)   Activity Tolerance   Activity Tolerance Patient limited by fatigue   Nurse Made Aware yes   Assessment   Prognosis Good   Problem List Decreased strength;Decreased range of motion;Decreased endurance; Impaired balance;Decreased mobility; Decreased coordination;Decreased safety awareness   Assessment Patient seen for Physical Therapy evaluation  Patient admitted with Right lower lobe pneumonia ([facility] [address] )  Comorbidities affecting patient's physical performance include:CABG x1 and stent, HTN, HLD, COPD, prostate CA, type 2 diabetes who presents with cough and not feeling good, depression, ETOH, gait dysfunction  Personal factors affecting patient at time of initial evaluation include: inability to ambulate household distances, inability to navigate community distances, inability to navigate level surfaces without external assistance, inability to perform dynamic tasks in community, positive fall history, inability to perform current job functions, inability to perform caregiver support/tasks, inability to perform physical activity, inability to perform ADLS and inability to perform IADLS   Prior to admission, patient was independent with functional mobility without assistive device, independent with ADLS, independent with IADLS, living in a multi-level home, ambulating household distance and ambulating community distances  Please find objective findings from Physical Therapy assessment regarding body systems outlined above with impairments and limitations including weakness, decreased ROM, impaired balance, decreased endurance, impaired coordination, gait deviations, decreased activity tolerance, decreased functional mobility tolerance, decreased safety awareness and fall risk    The Barthel Index was used as a functional outcome tool presenting with a score of 55 today indicating marked limitations of functional mobility and ADLS  Patient's clinical presentation is currently unstable/unpredictable as seen in patient's presentation of vital sign response, changing level of pain, increased fall risk, new onset of impairment of functional mobility, decreased endurance and new onset of weakness  Pt would benefit from continued Physical Therapy treatment to address deficits as defined above and maximize level of functional mobility  As demonstrated by objective findings, the assigned level of complexity for this evaluation is high  Goals   Patient Goals to get stronger and take better care of self   STG Expiration Date 10/17/18   Short Term Goal #1 transfers and gait without assistive independently   Short Term Goal #2 gait endurance to functional community distances, improve higher level balance to fair +, strength BLEs 4/5 all to meet patient goal of improving strength and improved care for self   LTG Expiration Date 10/24/18   Long Term Goal #1 indpendent use of 5 flights of stairs with rest periods as needed   Long Term Goal #2 improved dynamic gait balance to good   Treatment Day 1   Plan   Treatment/Interventions ADL retraining;Functional transfer training;LE strengthening/ROM; Elevations; Therapeutic exercise; Endurance training;Patient/family training;Equipment eval/education;Gait training; Compensatory technique education   PT Frequency 5x/wk   Recommendation   Recommendation (home with follow up Balance center as needed as outpatient)   Barthel Index   Feeding 10   Bathing 0   Grooming Score 0   Dressing Score 5   Bladder Score 10   Bowels Score 10   Toilet Use Score 5   Transfers (Bed/Chair) Score 10   Mobility (Level Surface) Score 0   Stairs Score 5   Barthel Index Score 54   Licensure   [place] License Number  [place] [facility] [id number]       PT TREATMENT  Time In:1125   Time Out:1140   Total Time: 15min      S:  Patient reports feeling off balance with standing/movement  O:  -sit to and from stand with supervision  -gait with supervision to min assist at times for change in direction with balance loss laterally and patient holding onto furniture/wall  -standing balance activity completed without UE support: sidestepping, backward walking and heel/toe raises all with min to mod assist at times to maintain balance and upright standing  A:  Patient cooperative and motivated to improve balance and strength and will benefit from continued PT with possible out patient balance center as needed  P:  DC recommendation: home with out patient balance center as needed    Kristen Babcock, [id number]

## 2018-10-10 NOTE — OCCUPATIONAL THERAPY NOTE
OT EVALUATION     10/10/18 1112   Note Type   Note type Eval only   Restrictions/Precautions   Other Precautions Fall Risk;Impulsive   Pain Assessment   Pain Assessment No/denies pain   Home Living   Type of Home Apartment   Home Layout One level;Elevator   Bathroom Shower/Tub Tub/shower unit   Bathroom Equipment Grab bars in shower   Prior Function   Level of [place] Independent with ADLs and functional mobility   Lives With Alone   Receives Help From Friend(s)   ADL Assistance Independent   IADLs Independent   Comments uses bus for transportation  Patient reports after the doctor spoke to him today he is going to stop drinking and smoking  ADL   Eating Assistance 7  Independent   Grooming Assistance 7  Independent   UB Bathing Assistance 7  Independent   LB Bathing Assistance 5  Supervision/Setup   UB Dressing Assistance 7  Independent   LB Dressing Assistance 5  Supervision/Setup   Toileting Assistance  5  Supervision/Setup   Bed Mobility   Supine to Sit 7  Independent   Sit to Supine 7  Independent   Transfers   Sit to Stand 5  Supervision   Stand to Sit 5  Supervision   Stand pivot 5  Supervision   Functional Mobility   Functional Mobility 5  Supervision   Additional Comments 10 feet   Balance   Static Sitting Good   Dynamic Sitting Fair +   Static Standing Fair +   Dynamic Standing Fair   Activity Tolerance   Activity Tolerance Patient limited by fatigue   RUE Assessment   RUE Assessment WNL   LUE Assessment   LUE Assessment WNL   Cognition   Overall Cognitive Status WFL   Arousal/Participation Cooperative   Attention Within functional limits   Orientation Level Oriented X4   Following Commands Follows multistep commands with increased time or repetition   Comments pt is impulsive    Assessment   Limitation Decreased ADL status; Decreased UE strength;Decreased Safe judgement during ADL;Decreased endurance;Decreased self-care trans;Decreased high-level ADLs  (decreased balance and mobility )   Prognosis Good   Assessment Patient evaluated by Occupational Therapy  Patient admitted with Right lower lobe pneumonia ([facility] [address] )  The patients occupational profile, medical and therapy history includes a expanded review of medical and/or therapy records and additional review of physical, cognitive, or psychosocial history related to current functional performance  Comorbidities affecting functional mobility and ADLS include: CABG, cardiac disease, COPD, diabetes, hypertension and cancer  Prior to admission, patient was independent with functional mobility without assistive device, independent with ADLS and independent with IADLS  The evaluation identifies the following performance deficits: impaired balance, decreased endurance, increased fall risk, new onset of impairment of functional mobility, decreased ADLS, decreased IADLS and SOB upon exertion, that result in activity limitations and/or participation restrictions  This evaluation requires clinical decision making of moderate complexity, because the patient may present with comorbidities that affect occupational performance and required minimal or moderate modification of tasks or assistance with the consideration of several treatment options  The Barthel Index was used as a functional outcome tool presenting with a score of 60, indicating moderate limitations of functional mobility and ADLS  Patient will benefit from skilled Occupational Therapy services to address above deficits and facilitate a safe return to prior level of function     Goals   Patient Goals go home   STG Time Frame (1-7 days)   Short Term Goal  Goals established to promote patient goal of going home:  Patient will increase standing tolerance to 5 minutes during ADL task to decrease assistance level and decrease fall risk;  Patient will increase functional mobility to and from bathroom with no assistive device independently to increase performance with ADLS and to use a toilet; Patient will tolerate 10 minutes of UE ROM/strengthening to increase general activity tolerance and performance in ADLS/IADLS; Patient will improve functional activity tolerance to 10 minutes of sustained functional tasks to increase participation in basic self-care and decrease assistance level;  Patient will be able to to verbalize understanding and perform energy conservation/proper body mechanics during ADLS and functional mobility at least 75% of the time with minimal cueing to decrease signs of fatigue and increase stamina to return to prior level of function; ;  Patient will increase dynamic standing balance to fair+ to improve postural stability and decrease fall risk during standing ADLS and transfers  LTG Time Frame (8-14 days)   Long Term Goal Goals established to promote patient goal of going home:  Patient will increase standing tolerance to 10 minutes during ADL task to decrease assistance level and decrease fall risk;  Patient will tolerate 20 minutes of UE ROM/strengthening to increase general activity tolerance and performance in ADLS/IADLS; Patient will improve functional activity tolerance to 20 minutes of sustained functional tasks to increase participation in basic self-care and decrease assistance level;  Patient will be able to to verbalize understanding and perform energy conservation/proper body mechanics during ADLS and functional mobility at least 90% of the time without cueing to decrease signs of fatigue and increase stamina to return to prior level of function; ;  Patient will increase dynamic standing balance to good to improve postural stability and decrease fall risk during standing ADLS and transfers       Functional Transfer Goals   Pt Will Perform All Functional Transfers (STG independent )   ADL Goals   Pt Will Perform Bathing (LTG independent )   Pt Will Perform LE Dressing (STG independent )   Pt Will Perform Toileting (STG independent )   Plan   Treatment Interventions ADL retraining;UE strengthening/ROM; Functional transfer training; Endurance training;Patient/family training;Equipment evaluation/education; Activityengagement; Energy conservation; Compensatory technique education   Goal Expiration Date 10/24/18   Treatment Day 0   OT Frequency 3-5x/wk   Recommendation   OT Discharge Recommendation Home independent   Barthel Index   Feeding 10   Bathing 0   Grooming Score 5   Dressing Score 5   Bladder Score 10   Bowels Score 10   Toilet Use Score 5   Transfers (Bed/Chair) Score 10   Mobility (Level Surface) Score 0   Stairs Score 5   Barthel Index Score 61   Licensure   [place] License Number  Adilene Gonzalez MS OTR/L [id number]

## 2018-10-10 NOTE — ASSESSMENT & PLAN NOTE
Initially hypertensive    Patient on beta-blocker/CCB  · Continue Metoprolol/Amlodipine  · Monitor BP

## 2018-10-11 ENCOUNTER — HOSPITAL ENCOUNTER (EMERGENCY)
Facility: HOSPITAL | Age: 58
Discharge: LEFT BEFORE DISCHARGE | End: 2018-10-11
Attending: EMERGENCY MEDICINE
Payer: MEDICARE

## 2018-10-11 VITALS
RESPIRATION RATE: 18 BRPM | SYSTOLIC BLOOD PRESSURE: 134 MMHG | TEMPERATURE: 98.1 F | DIASTOLIC BLOOD PRESSURE: 72 MMHG | HEART RATE: 80 BPM | OXYGEN SATURATION: 94 %

## 2018-10-11 VITALS
TEMPERATURE: 98.3 F | SYSTOLIC BLOOD PRESSURE: 146 MMHG | OXYGEN SATURATION: 95 % | HEART RATE: 100 BPM | HEIGHT: 74 IN | RESPIRATION RATE: 18 BRPM | DIASTOLIC BLOOD PRESSURE: 72 MMHG | BODY MASS INDEX: 29.28 KG/M2 | WEIGHT: 228.18 LBS

## 2018-10-11 DIAGNOSIS — J44.9 COPD (CHRONIC OBSTRUCTIVE PULMONARY DISEASE) (HCC): Primary | ICD-10-CM

## 2018-10-11 PROBLEM — E87.20 LACTIC ACIDOSIS: Status: RESOLVED | Noted: 2018-10-09 | Resolved: 2018-10-11

## 2018-10-11 PROBLEM — E87.2 LACTIC ACIDOSIS: Status: RESOLVED | Noted: 2018-10-09 | Resolved: 2018-10-11

## 2018-10-11 PROBLEM — E83.39 LOW PHOSPHATE LEVELS: Status: RESOLVED | Noted: 2018-10-10 | Resolved: 2018-10-11

## 2018-10-11 LAB
ANION GAP SERPL CALCULATED.3IONS-SCNC: 10 MMOL/L (ref 4–13)
BUN SERPL-MCNC: 14 MG/DL (ref 5–25)
CALCIUM SERPL-MCNC: 7.9 MG/DL (ref 8.3–10.1)
CHLORIDE SERPL-SCNC: 102 MMOL/L (ref 100–108)
CO2 SERPL-SCNC: 27 MMOL/L (ref 21–32)
CREAT SERPL-MCNC: 0.79 MG/DL (ref 0.6–1.3)
GFR SERPL CREATININE-BSD FRML MDRD: 100 ML/MIN/1.73SQ M
GLUCOSE SERPL-MCNC: 158 MG/DL (ref 65–140)
GLUCOSE SERPL-MCNC: 171 MG/DL (ref 65–140)
LACTATE SERPL-SCNC: 1.8 MMOL/L (ref 0.5–2)
LACTATE SERPL-SCNC: 2.1 MMOL/L (ref 0.5–2)
LACTATE SERPL-SCNC: 2.1 MMOL/L (ref 0.5–2)
MAGNESIUM SERPL-MCNC: 1.6 MG/DL (ref 1.6–2.6)
MRSA NOSE QL CULT: NORMAL
PHOSPHATE SERPL-MCNC: 2.8 MG/DL (ref 2.7–4.5)
POTASSIUM SERPL-SCNC: 3.5 MMOL/L (ref 3.5–5.3)
SODIUM SERPL-SCNC: 139 MMOL/L (ref 136–145)

## 2018-10-11 PROCEDURE — 94762 N-INVAS EAR/PLS OXIMTRY CONT: CPT

## 2018-10-11 PROCEDURE — 80048 BASIC METABOLIC PNL TOTAL CA: CPT | Performed by: FAMILY MEDICINE

## 2018-10-11 PROCEDURE — 83735 ASSAY OF MAGNESIUM: CPT | Performed by: FAMILY MEDICINE

## 2018-10-11 PROCEDURE — 94640 AIRWAY INHALATION TREATMENT: CPT

## 2018-10-11 PROCEDURE — 83605 ASSAY OF LACTIC ACID: CPT | Performed by: FAMILY MEDICINE

## 2018-10-11 PROCEDURE — 82948 REAGENT STRIP/BLOOD GLUCOSE: CPT

## 2018-10-11 PROCEDURE — 99238 HOSP IP/OBS DSCHRG MGMT 30/<: CPT | Performed by: FAMILY MEDICINE

## 2018-10-11 PROCEDURE — 94760 N-INVAS EAR/PLS OXIMETRY 1: CPT

## 2018-10-11 PROCEDURE — 99283 EMERGENCY DEPT VISIT LOW MDM: CPT

## 2018-10-11 PROCEDURE — 84100 ASSAY OF PHOSPHORUS: CPT | Performed by: FAMILY MEDICINE

## 2018-10-11 RX ORDER — AZITHROMYCIN 250 MG/1
500 TABLET, FILM COATED ORAL EVERY 24 HOURS
Status: DISCONTINUED | OUTPATIENT
Start: 2018-10-11 | End: 2018-10-11 | Stop reason: HOSPADM

## 2018-10-11 RX ORDER — POTASSIUM CHLORIDE 20 MEQ/1
40 TABLET, EXTENDED RELEASE ORAL ONCE
Status: COMPLETED | OUTPATIENT
Start: 2018-10-11 | End: 2018-10-11

## 2018-10-11 RX ORDER — CHLORDIAZEPOXIDE HYDROCHLORIDE 25 MG/1
50 CAPSULE, GELATIN COATED ORAL ONCE
Status: DISCONTINUED | OUTPATIENT
Start: 2018-10-11 | End: 2018-10-11 | Stop reason: HOSPADM

## 2018-10-11 RX ORDER — MAGNESIUM SULFATE HEPTAHYDRATE 40 MG/ML
2 INJECTION, SOLUTION INTRAVENOUS ONCE
Status: COMPLETED | OUTPATIENT
Start: 2018-10-11 | End: 2018-10-11

## 2018-10-11 RX ORDER — LORAZEPAM 1 MG/1
1 TABLET ORAL ONCE
Status: COMPLETED | OUTPATIENT
Start: 2018-10-11 | End: 2018-10-11

## 2018-10-11 RX ADMIN — IPRATROPIUM BROMIDE AND ALBUTEROL SULFATE 3 ML: .5; 3 SOLUTION RESPIRATORY (INHALATION) at 07:43

## 2018-10-11 RX ADMIN — LORAZEPAM 1 MG: 1 TABLET ORAL at 04:31

## 2018-10-11 RX ADMIN — SODIUM CHLORIDE 125 ML/HR: 0.9 INJECTION, SOLUTION INTRAVENOUS at 04:32

## 2018-10-11 RX ADMIN — AMLODIPINE BESYLATE 5 MG: 5 TABLET ORAL at 09:08

## 2018-10-11 RX ADMIN — METHYLPREDNISOLONE SODIUM SUCCINATE 20 MG: 40 INJECTION, POWDER, FOR SOLUTION INTRAMUSCULAR; INTRAVENOUS at 05:54

## 2018-10-11 RX ADMIN — NICOTINE 1 PATCH: 21 PATCH, EXTENDED RELEASE TRANSDERMAL at 09:07

## 2018-10-11 RX ADMIN — INSULIN LISPRO 1 UNITS: 100 INJECTION, SOLUTION INTRAVENOUS; SUBCUTANEOUS at 09:09

## 2018-10-11 RX ADMIN — POTASSIUM CHLORIDE 40 MEQ: 1500 TABLET, EXTENDED RELEASE ORAL at 09:39

## 2018-10-11 RX ADMIN — CLOPIDOGREL BISULFATE 75 MG: 75 TABLET ORAL at 09:07

## 2018-10-11 RX ADMIN — FAMOTIDINE 20 MG: 20 TABLET ORAL at 09:08

## 2018-10-11 RX ADMIN — MAGNESIUM SULFATE HEPTAHYDRATE 2 G: 40 INJECTION, SOLUTION INTRAVENOUS at 09:39

## 2018-10-11 RX ADMIN — Medication 100 MG: at 09:08

## 2018-10-11 RX ADMIN — ENOXAPARIN SODIUM 40 MG: 40 INJECTION SUBCUTANEOUS at 09:08

## 2018-10-11 RX ADMIN — Medication 1 TABLET: at 09:07

## 2018-10-11 RX ADMIN — RANOLAZINE 500 MG: 500 TABLET, FILM COATED, EXTENDED RELEASE ORAL at 09:08

## 2018-10-11 RX ADMIN — SERTRALINE HYDROCHLORIDE 50 MG: 50 TABLET ORAL at 09:08

## 2018-10-11 RX ADMIN — METOPROLOL TARTRATE 50 MG: 50 TABLET ORAL at 09:08

## 2018-10-11 RX ADMIN — FOLIC ACID 1 MG: 1 TABLET ORAL at 09:08

## 2018-10-11 RX ADMIN — CHLORDIAZEPOXIDE HYDROCHLORIDE 50 MG: 25 CAPSULE ORAL at 05:55

## 2018-10-11 RX ADMIN — ASPIRIN 81 MG: 81 TABLET, COATED ORAL at 09:08

## 2018-10-11 NOTE — NURSING NOTE
Patient's wallet found in one of the tray drawers by housekeeping while they were cleaning the room  Dex Stapleton was then given to security  Patient was called by RN and notified to  his wallet with security

## 2018-10-11 NOTE — NURSING NOTE
This morning, patient expressed to me and the nurses aid that his wallet was missing  I personally never saw the wallet, but we proceeded to search his room  Patient stated that he searched his belongings/bags  Patient states that it went missing after the kitchen took out his dirty tray  Security checked the camera and stated that they did not see the wallet on the tray

## 2018-10-11 NOTE — ED NOTES
Patient seen by registration walking out rear ER ambulance doors and kept walking  Dr Nuvia Ayon made aware       Jody Centeno RN  10/11/18 0903

## 2018-10-11 NOTE — UTILIZATION REVIEW
Initial Clinical Review     Admission: Date/Time/Statement: 10/9/18 @ 2117            Orders Placed This Encounter   Procedures    Inpatient Admission (expected length of stay for this patient is greater than two midnights)       Standing Status:   Standing       Number of Occurrences:   1       Order Specific Question:   Admitting Physician       Answer:   Jazmín Sidhu       Order Specific Question:   Level of Care       Answer:   Med Surg [16]       Order Specific Question:   Estimated length of stay       Answer:   More than 2 Midnights       Order Specific Question:   Certification       Answer:   I certify that inpatient services are medically necessary for this patient for a duration of greater than two midnights  See H&P and MD Progress Notes for additional information about the patient's course of treatment             ED: Date/Time/Mode of Arrival:             ED Arrival Information      Expected Arrival Acuity Means of Arrival Escorted By Service Admission Type     - 10/9/2018 17:45 Urgent Walk-In Self Hospitalist Urgent     Arrival Complaint     Cough              Chief Complaint:        Chief Complaint   Patient presents with    Cough       Was diagnosed with pneumonia on 10/4  was started on moxifloxacin but patient stats he doesn't feel better, states "I just don't feel right" patient is shakey and admits to drinking a 5th of vodka last night         History of Illness: Shila Ibarra a 66 y  o  male with a PMH of CABG x1 and stent, HTN, HLD, COPD, prostate CA, type 2 diabetes who presents with cough and not feeling good   Patient was seen by his PMD on the 4th for regular checkup and told his PMD that he is "just not feeling right" with cough, upset stomach, headache, and is short of breath   He was given prescription of Moxifloxacin for possible pneumonia   Patient is still not any better and continued to have the above symptoms   He was using his inhalers with no relief   On top of this, he has been drinking bottle of vodka daily  David Roberts has a history of alcoholism in the past and has not been drinking for the past 3 years  David Roberts admits that he has been drinking lately for the past few months, he denies being depress, suicidal nor homicidal  David Roberts admits to be having visual hallucination while he was at home but is resolved now  He denies auditory hallucination  He is jittery on exam  His ethanol level is less than 3   His lactic acid was 2 9 on admission and was given a liter of IV fluids   He has mild leukocytosis   He still has non-productive cough, described as yellow in color   He denies to chest pain/chest tightness   He stated that his breathing is much better since he was given nebulizer and steroid in the ED   His chest x-ray is consistent with pneumonia, blood culture x2 was done, and Zosyn IV was initiated in the ED   Patient is then admitted to our service      ED Vital Signs:            ED Triage Vitals   Temperature Pulse Respirations Blood Pressure SpO2   10/09/18 1757 10/09/18 1757 10/09/18 1757 10/09/18 1757 10/09/18 1757   98 8 °F (37 1 °C) (!) 107 18 (!) 159/101 95 %       Temp Source Heart Rate Source Patient Position - Orthostatic VS BP Location FiO2 (%)   10/09/18 1757 10/09/18 1757 10/09/18 1757 10/09/18 1757 --   Oral Monitor Lying Left arm         Pain Score           10/09/18 2234           No Pain                Wt Readings from Last 1 Encounters:   10/09/18 103 kg (227 lb 1 2 oz)      Abnormal Labs/Diagnostic Test Results: CA 8 2 ALB 3 2 PHOS 1 7 MG 1 0  LACTIC ACID 2 9,3 0 WBC 10 73  CXR No acute cardiopulmonary disease    ED Treatment:              Medication Administration from 10/09/2018 1745 to 10/09/2018 2204        Date/Time Order Dose Route Action Action by Comments       10/09/2018 2000 sodium chloride 0 9 % bolus 1,000 mL 0 mL Intravenous Stopped Tory Woodson RN         10/09/2018 1848 sodium chloride 0 9 % bolus 1,000 mL 1,000 mL Intravenous New Bag Benjamin Hemphill RN         10/09/2018 1901 ipratropium-albuterol (DUO-NEB) 0 5-2 5 mg/3 mL inhalation solution 3 mL 3 mL Nebulization Given Benjamin Hemphill RN         10/09/2018 2145 piperacillin-tazobactam (ZOSYN) IVPB 3 375 g 0 g Intravenous Stopped Kenia Shin RN         10/09/2018 2114 piperacillin-tazobactam (ZOSYN) IVPB 3 375 g 3 375 g Intravenous [address] Benjamin Hemphill RN         10/09/2018 2114 methylPREDNISolone sodium succinate (Solu-MEDROL) injection 125 mg 125 mg Intravenous Given eBnjamin Hemphill RN         10/09/2018 2115 ipratropium-albuterol (DUO-NEB) 0 5-2 5 mg/3 mL inhalation solution 3 mL 3 mL Nebulization Given Benjamin Hemphill RN               Past Medical/Surgical History: Active Ambulatory Problems     Diagnosis Date Noted    No Active Ambulatory Problems           Resolved Ambulatory Problems     Diagnosis Date Noted    No Resolved Ambulatory Problems           Past Medical History:   Diagnosis Date    Cancer [facility])      Cardiac disease      [facility]      Diabetes mellitus ([facility] )      Hx of heart artery stent      Hyperlipidemia      Hypertension      Prostate CA (HCC)      Renal disorder      S/P CABG x 1           Admitting Diagnosis: Cough [R05]  COPD (chronic obstructive pulmonary disease) (HCC) [J44 9]  Pneumonia [J18 9]     Age/Sex: 66 y o  male     Assessment/Plan:       Right lower lobe pneumonia (HCC)   Assessment & Plan     Checks x-ray consistent with right lower lobe pneumonia   Patient with consistent cough   Blood cultures and Zosyn IV started in the ED  · Check blood cultures  · Continue neb treatment  · Continue IV antibiotics  · Check strep pneumonia and Legionella urine  · Check procalcitonin level  · Check sputum culture      Lactic acidosis   Assessment & Plan     LA likely from decrease clearance to the liver due to his alcohol abuse   S/P 1L NS in the ED  · Trend lactic acid till normalize  · Hold Metformin  · IV fluids per LA result  · IV maintenance      Hypomagnesemia   Assessment & Plan     Mag level 1 0 likely from alcohol abuse  · Repleted with 2 gram Mg IV  · Check Mg level at 0600  · Monitor/telemetry   Low phosphate levels   Assessment & Plan     Phosphate level 1 7 likely from alcohol abuse  · Repeated with K-Phos per pharmacist recommendation given patient can take p o  meds  · Repeat phosphate in a m       Chronic obstructive pulmonary disease with acute exacerbation (HCC)   Assessment & Plan     History of smoking 1 to 2 ppd   Started at a very young age, 17years old Patient on Breo     · Continue with Advair  · Solu-Medrol IV initiated in the ED, continue with tapering dose  · Neb treatment  · Monitor pulse ox   HLD (hyperlipidemia)   Assessment & Plan     Patient on Rosuvastatin  · Continue with Atorvastatin  · Check lipid profile   Hx of CABG   Assessment & Plan     History of CABG 2004  Stent in 2014  Patient's Cardiologist is from [address] on ASA/Plavix and Ranexa  Initial Troponin 0 03  · Trend troponin  · Continue ASA/Plavix/Ranexa  · Telemetry/monitor      Depression   Assessment & Plan     Patient on Zoloft    · Continue   Essential (primary) hypertension   Assessment & Plan     Initially hypertensive   Patient on beta-blocker/CCB  · Continue Metoprolol/Amlodipine  · Monitor BP   Type 2 diabetes mellitus (HCC)   Assessment & Plan     No results found for: HGBA1C           Recent Labs      10/10/18   0140   POCGLU  150*      Blood Sugar Average: Last 72 hrs:  (P) 150   Hold Metformin due to lactic acidosis   SSI ALG1  · Fingerstick AC/HS  · Check hemoglobin A1c      Alcohol abuse   Assessment & Plan     Admits to drinking bottle of vodka every day for months   Ethanol level less than 3   · CIWA protocol initiated  · Librium protocol  · Folic acid/Thiamine started  · IV hydration  · Seizure precaution  · Prevent withdrawal symptoms  · Alcohol counseling       VTE Prophylaxis: Enoxaparin (Lovenox)  / sequential compression device   Code Status: Level 1 - Full Code   Anticipated Length of Stay: Teresa Martinez will be admitted on an Inpatient basis with an anticipated length of stay of  > 2 midnights    Justification for Hospital Stay:  IV antibiotics, IV hydration, CIWA protocol         Admission Orders:  TELE MON  RESPIRATORY PROTOCOL  DAILY WEIGHT I/O  ELEVATE HOB 30 DEGREE  PT OT  SPUTUM GS CS  PHOS MG   LACTIC ACID BMP  Scheduled Meds:   Current Facility-Administered Medications:  acetaminophen 650 mg Oral Q6H PRN Darline S Edwards, MARIANNENP     amLODIPine 5 mg Oral Daily Darline S Edwards, CRNP     aspirin 81 mg Oral Daily Darline S Edwards, CRNP     atorvastatin 40 mg Oral Daily With [employer], CHELSEA     azithromycin 500 mg Intravenous Q24H Brandy Revankar, DO Last Rate: 500 mg (10/10/18 1256)   cefTRIAXone 1,000 mg Intravenous Q24H Brandy Revankar, DO Last Rate: 1,000 mg (10/10/18 1215)   chlordiazePOXIDE 50 mg Oral Q4H CHELSEA Rocha     Followed by             [START ON 10/11/2018] [id number]     Followed by             [START ON 10/12/2018] chlordiazePOXIDE 25 mg Oral Q4H Darline S CHELSEA Edwards     Followed by             [START ON 10/13/2018] chlordiazePOXIDE 25 mg Oral Q6H [facility] Darline S Jerry, CHELSEA     clopidogrel 75 mg Oral Daily Darline S Edwards, CRNP     enoxaparin 40 mg Subcutaneous Daily Darline S Edwards, CRNP     famotidine 20 mg Oral BID Darline S Edwards, CRMARCO ANTONIO     fluticasone-vilanterol 1 puff Inhalation Daily CHELSEA Zacarias     folic acid 1 mg Oral Daily Darline S Edwards, CRNP     insulin lispro 1-5 Units Subcutaneous HS Darline S Edwards, CRNP     insulin lispro 1-5 Units Subcutaneous TID AC Brandy Revankar, DO     ipratropium-albuterol 3 mL Nebulization Q6H Darline S Edwards, CRNP     ipratropium-albuterol 3 mL Nebulization Q4H PRN Brandy Revankar, DO   methylPREDNISolone sodium succinate 20 mg Intravenous Q8H [address] Brandy Revankar, DO     metoprolol tartrate 50 mg Oral Daily CHELSEA Rocha     multivitamin-minerals 1 tablet Oral Daily CHELSEA Rogers     nicotine 1 patch Transdermal Daily CHELSEA Rocha     ranolazine 500 mg Oral BID Darline Oliva, CHELSEA     sertraline 50 mg Oral Daily CHELSEA Rocha     sodium chloride 1,000 mL Intravenous Once Brandy Revankar, DO Last Rate: 1,000 mL (10/10/18 1410)   sodium chloride 125 mL/hr Intravenous Continuous Brandy Revankar, DO Last Rate: 125 mL/hr (10/10/18 0934)   thiamine 100 mg Oral Daily CHELSEA Rocha        Continuous Infusions:   sodium chloride 125 mL/hr Last Rate: 125 mL/hr (10/10/18 0934)      PRN Meds:   acetaminophen    ipratropium-albuterol

## 2018-10-11 NOTE — PLAN OF CARE
DISCHARGE PLANNING - CARE MANAGEMENT     Discharge to post-acute care or home with appropriate resources Progressing        RESPIRATORY - ADULT     Achieves optimal ventilation and oxygenation Progressing

## 2018-10-11 NOTE — DISCHARGE SUMMARY
Discharge Summary - [facility]    Patient Information: Micheal Schwartz 66 y o  male MRN: [id number]  Unit/Bed#: [address] [place] [address] Encounter: [id number]    Discharging Physician / Practitioner: Spencer Emery DO  PCP: Dorinda Walker  Admission Date: 10/9/2018  Discharge Date: 10/11/18    Reason for Admission: Cough (Was diagnosed with pneumonia on 10/4  was started on moxifloxacin but patient stats he doesn't feel better, states "I just don't feel right" patient is shakey and admits to drinking a 5th of vodka last night)      Discharge Diagnoses:     Principal Problem:    Right lower lobe pneumonia ([facility] )  Active Problems:    Alcohol withdrawal syndrome without complication ([facility] )    Chronic obstructive pulmonary disease with acute exacerbation ([facility] )    Type 2 diabetes mellitus ([facility] )    Essential (primary) hypertension    Hypomagnesemia    Depression    Hx of CABG    HLD (hyperlipidemia)  Resolved Problems:    Lactic acidosis    Low phosphate levels        * Right lower lobe pneumonia ([facility] )   Assessment & Plan    · Chest x-ray consistent with right lower lobe pneumonia  · Patient with no risk factors for aspiration pneumonia  denies any recent hospitalization  Denies any vomiting, trouble swallowing    · He was initially started on IV Zosyn which was later discontinued and then started on IV Rocephin and Zithromax  · blood cultures negative so far  · Urine strep pneumonia and Legionella negative  · Procalcitonin level < 0 05     Alcohol withdrawal syndrome without complication (HCC)   Assessment & Plan    Admits to drinking bottle of vodka every day for months  Ethanol level less than 3   · CIWA protocol initiated  Patient was started on Librium taper, multivitamin, Folic acid, Thiamine  · Alcohol cessation discussed with patient    He was not interested in seeing psychiatry or outpatient counseling     Lactic acidosisresolved as of 10/11/2018   Assessment & Plan    LA likely from decrease clearance to the liver due to his alcohol abuse  Possibly from nebulizer treatments as well  Received fluid boluses  Resolved on repeat labwork  · Metformin was held here     Chronic obstructive pulmonary disease with acute exacerbation ([address] )   Assessment & Plan    History of smoking 1 to 2 ppd  Started at a very young age, 17years old  Patient on Advair at home  · Patient was on Breo here and started on IV Solu-Medrol     HLD (hyperlipidemia)   Assessment & Plan    Continue statin  · LDL of 124     Hx of CABG   Assessment & Plan    History of CABG 2004  Stent in 2014  Patient's Cardiologist is from [place], [place]     · Troponins negative  · On ASA, Lopressor, Plavix, statin, Ranexa         Depression   Assessment & Plan    Continue Zoloft     Hypomagnesemia   Assessment & Plan    · Repleted     Essential (primary) hypertension   Assessment & Plan    Continue Norvasc, Metoprolol     Type 2 diabetes mellitus [facility])   Assessment & Plan    Lab Results   Component Value Date    HGBA1C 6 0 10/10/2018       Recent Labs      10/10/18   1100  10/10/18   1605  10/10/18   1959  10/11/18   0714   POCGLU  254*  151*  164*  158*     Metformin was held here due to lactic acidosis  Low phosphate levelsresolved as of 10/11/2018   Assessment & Plan    likely from alcohol abuse  · Resolved with repletion         Consultations During Hospital Stay:  None    Procedures Performed:     · none    Significant Findings:     · See hospital course and above    Imaging while in hospital:    Xr Chest 2 Views    Addendum Date: 10/9/2018 Addendum:   ADDENDUM: There is slightly increased patchy opacity within the right lower lobe, concerning for developing pneumonia  Follow-up after clinical treatment recommended to ensure resolution  Result Date: 10/9/2018  Narrative: CHEST INDICATION:   cough   COMPARISON:  11/14/2007 EXAM PERFORMED/VIEWS:  XR CHEST PA & LATERAL  The frontal view was performed utilizing dual energy radiographic technique  FINDINGS:  Median sternotomy wires are intact  Cardiomediastinal silhouette appears unremarkable  The lungs are clear  No pneumothorax or pleural effusion  Osseous structures appear within normal limits for patient age  Impression: No acute cardiopulmonary disease  Workstation performed: [id number]       Incidental Findings:   · none    Test Results Pending at Discharge (will require follow up):   · As per After Visit Summary     Outpatient Tests Requested:  · N/A    Complications:  See hospital course and above    Hospital Course:     Vesna Diaz is a 66 y o  male patient who originally presented to the hospital on 10/9/2018 due to cough and not feeling well  He was seen by his PMD and prescribed moxifloxacin for possible pneumonia  he also reported drinking a bottle of vodka daily  Workup in the ER revealed lactic acidosis, pneumonia and patient was admitted for further management  He was started on IV antibiotics and Librium taper  Patient this morning stated that his wallet had been still in and he wants to leave right away  Risks of leaving AMA explained to patient including worsening of condition, death  Patient showed understanding and did not want to stay any longer    Please see above list of diagnoses and related plan for additional information         Condition at Discharge: N/A    Discharge Day Visit / Exam:     Subjective:  States that his wallet has been and wants to leave right away    Vitals: Blood Pressure: 146/72 (10/11/18 0903)  Pulse: 100 (10/11/18 0903)  Temperature: 98 3 °F (36 8 °C) (10/11/18 0903)  Temp Source: Temporal (10/11/18 0903)  Respirations: 18 (10/11/18 0903)  Height: 6' 2" (188 cm) (10/09/18 2204)  Weight - Scale: 104 kg (228 lb 2 8 oz) (10/11/18 0600)  SpO2: 95 % (10/11/18 0903)  Exam:   Physical Exam    Left without being examined      Discharge instructions/Information to patient and family:(Discharge Medications and Follow up):   See after visit summary for information provided to patient and family  Provisions for Follow-Up Care:  See after visit summary for information related to follow-up care and any pertinent home health orders  Disposition: Left against medical advice    Planned Readmission:  No     Discharge Statement:  I spent 15 minutes discharging the patient  This time was spent on the day of discharge  I had direct contact with the patient on the day of discharge  Greater than 50% of the total time was spent examining patient, answering all patient questions, arranging and discussing plan of care with patient as well as directly providing post-discharge instructions  Additional time then spent on discharge activities  Discharge Medications:  See after visit summary for reconciled discharge medications provided to patient and family  ** Please Note: "This note has been constructed using a voice recognition system  Therefore there may be syntax, spelling, and/or grammatical errors   Please call if you have any questions  "**

## 2018-10-11 NOTE — NURSING NOTE
Patient left against medical advice  IV removed by patient  Dr Holden Blanton was notified and explained the risks of leaving AMA  Patient understood the risks and signed AMA form

## 2018-10-11 NOTE — PROGRESS NOTES
The azithromycin has / have been converted to Oral per [facility] IV-to-PO Auto-Conversion Protocol for Adults as approved by the Pharmacy and Therapeutics Committee  The patient met all eligible criteria:  3 Age = 25years old   2) Received at least one dose of the IV form   3) Receiving at least one other scheduled oral/enteral medication   4) Tolerating an oral/enteral diet   and did not have any exclusions:   1) Critical care patient   2) Active GI bleed (IF assessing H2RAs or PPIs)   3) Continuous tube feeding (IF assessing cipro, doxycycline, levofloxacin, minocycline, rifampin, or voriconazole)   4) Receiving PO vancomycin (IF assessing metronidazole)   5) Persistent nausea and/or vomiting   6) Ileus or gastrointestinal obstruction   7) Baldemar/nasogastric tube set for continuous suction   8) Specific order not to automatically convert to PO (in the order's comments or if discussed in the most recent Infectious Disease or primary team's progress notes)

## 2018-10-11 NOTE — PHYSICIAN ADVISOR
Current patient class: Inpatient  The patient is currently on Hospital Day: 3 at [address] [place] [address]      The patient was admitted to the hospital at 2117 on 10/9/18 for the following diagnosis:  Cough [R05]  COPD (chronic obstructive pulmonary disease) ([facility] [address] ) [J44 9]  Pneumonia [J18 9]       There is documentation in the medical record of an expected length of stay of at least 2 midnights  The patient is therefore expected to satisfy the 2 midnight benchmark and given the 2 midnight presumption is appropriate for INPATIENT ADMISSION  Given this expectation of a satisfying stay, CMS instructs us that the patient is most often appropriate for inpatient admission under part A provided medical necessity is documented in the chart  After review of the relevant documentation, labs, vital signs and test results, the patient is appropriate for INPATIENT ADMISSION  Admission to the hospital as an inpatient is a complex decision making process which requires the practitioner to consider the patients presenting complaint, history and physical examination and all relevant testing  With this in mind, in this case, the patient was deemed appropriate for INPATIENT ADMISSION  After review of the documentation and testing available at the time of the admission I concur with this clinical determination of medical necessity  Rationale is as follows: The patient is a 66 yrs old Male who presented to the ED at 10/9/2018  5:50 PM with a chief complaint of Cough (Was diagnosed with pneumonia on 10/4  was started on moxifloxacin but patient stats he doesn't feel better, states "I just don't feel right" patient is shakey and admits to drinking a 5th of vodka last night)     Given the need for further hospitalization, and along with the documentation of medical necessity present in the chart, the patient is appropriate for inpatient admission    The patient is expected to satisfy the 2 midnight benchmark, and will require further acute medical care  The patient does have comorbid conditions which increases the risk for significant adverse outcome  Given this the patient is appropriate for inpatient admission        The patients vitals on arrival were ED Triage Vitals   Temperature Pulse Respirations Blood Pressure SpO2   10/09/18 1757 10/09/18 1757 10/09/18 1757 10/09/18 1757 10/09/18 1757   98 8 °F (37 1 °C) (!) 107 18 (!) 159/101 95 %      Temp Source Heart Rate Source Patient Position - Orthostatic VS BP Location FiO2 (%)   10/09/18 1757 10/09/18 1757 10/09/18 1757 10/09/18 1757 --   Oral Monitor Lying Left arm       Pain Score       10/09/18 2234       No Pain           Past Medical History:   Diagnosis Date    Cancer [facility])     prostate ca,had radiation    Cardiac disease     stents,then triple bypass    COPD [facility]) [facility] [address] )     Diabetes mellitus ([facility] [address] )     Hx of heart artery stent     2014    Hyperlipidemia     Hypertension     Prostate CA ([facility] [address] )     Renal disorder     pyelonephritis    S/P CABG x 1     2004     Past Surgical History:   Procedure Laterality Date    TONSILLECTOMY             Consults have been placed to:   None    Vitals:    10/10/18 1934 10/10/18 2009 10/10/18 2038 10/10/18 2310   BP:   (!) 174/90 (!) 175/82   BP Location:    Right arm   Pulse:   97 91   Resp:   18 18   Temp:   98 1 °F (36 7 °C) 98 1 °F (36 7 °C)   TempSrc:    Temporal   SpO2: 95% 96% 93% 92%   Weight:       Height:           Most recent labs:    Recent Labs      10/09/18   1849   10/10/18   0507  10/10/18   0802   WBC  10 73*   --   7 08   --    HGB  13 9   --   13 8   --    HCT  42 1   --   41 7   --    PLT  224   < >  209   --    K  4 4   --   3 9   --    NA  136   --   140   --    CALCIUM  8 2*   --   7 9*   --    BUN  10   --   10   --    CREATININE  0 81   --   0 89   --    LIPASE  226   --    --    --    INR  1 05   --    --    --    TROPONINI   --    < >  0 03  <0 02   AST  43   -- --    --    ALT  35   --    --    --    ALKPHOS  74   --    --    --     < > = values in this interval not displayed         Scheduled Meds:  Current Facility-Administered Medications:  acetaminophen 650 mg Oral Q6H PRN Darline S Edwards, CRNP    amLODIPine 5 mg Oral Daily Darline S Edwards, CRNP    aspirin 81 mg Oral Daily Darline S Edwards, CRNP    atorvastatin 40 mg Oral Daily With [employer], CHELSEA    azithromycin 500 mg Intravenous Q24H Brandy Revankar, DO Last Rate: 500 mg (10/10/18 1256)   cefTRIAXone 1,000 mg Intravenous Q24H Brandy Revankar, DO Last Rate: 1,000 mg (10/10/18 1215)   chlordiazePOXIDE 50 mg Oral Q6H [address], CHELSEA    Followed by        Rebeka Mcduffie ON 10/12/2018] chlordiazePOXIDE 25 mg Oral Q4H Darline S EdwardsMARIANNENP    Followed by        Rebeka Mcduffie ON 10/13/2018] chlordiazePOXIDE 25 mg Oral Q6H [address] Darline S Edwards, CRMARCO ANTONIO    clopidogrel 75 mg Oral Daily Darline S Edwards, CRNP    enoxaparin 40 mg Subcutaneous Daily Darline S Edwards, CRNP    famotidine 20 mg Oral BID Darline S Edwards, CHELSEA    fluticasone-vilanterol 1 puff Inhalation Daily Darline S Rut, CHELSEA    folic acid 1 mg Oral Daily Darline S Edwards, CRNP    insulin lispro 1-5 Units Subcutaneous HS Darline S Edwards, CRNP    insulin lispro 1-5 Units Subcutaneous TID AC Brandy Revankar, DO    ipratropium-albuterol 3 mL Nebulization Q6H Darline S Edwards, CRNP    ipratropium-albuterol 3 mL Nebulization Q4H PRN Brandy Revankar, DO    melatonin 3 mg Oral HS Darline S Edwards, CRNP    methylPREDNISolone sodium succinate 20 mg Intravenous Q8H [address] Brandy Revankar, DO    metoprolol tartrate 50 mg Oral Daily Darline S Rut, CHELSEA    multivitamin-minerals 1 tablet Oral Daily Darline S Holstein, CRNP    nicotine 1 patch Transdermal Daily Darline S Edwards, CRNP    ranolazine 500 mg Oral BID CHELSEA Rocha    sertraline 50 mg Oral Daily CHELSEA Rocha    sodium chloride 125 mL/hr Intravenous Continuous Brandy Revankar, DO Last Rate: 125 mL/hr (10/10/18 2030)   thiamine 100 mg Oral Daily CHELSEA Rocha      Continuous Infusions:  sodium chloride 125 mL/hr Last Rate: 125 mL/hr (10/10/18 2030)     PRN Meds:   acetaminophen    ipratropium-albuterol    Surgical procedures (if appropriate):

## 2018-10-12 ENCOUNTER — HOSPITAL ENCOUNTER (EMERGENCY)
Facility: HOSPITAL | Age: 58
Discharge: HOME | End: 2018-10-12
Attending: EMERGENCY MEDICINE | Admitting: EMERGENCY MEDICINE
Payer: MEDICARE

## 2018-10-12 ENCOUNTER — PATIENT OUTREACH (OUTPATIENT)
Dept: PULMONOLOGY | Age: 58
End: 2018-10-12

## 2018-10-12 VITALS
HEART RATE: 98 BPM | TEMPERATURE: 97.7 F | DIASTOLIC BLOOD PRESSURE: 70 MMHG | RESPIRATION RATE: 20 BRPM | OXYGEN SATURATION: 96 % | SYSTOLIC BLOOD PRESSURE: 128 MMHG

## 2018-10-12 DIAGNOSIS — J44.9 COPD (CHRONIC OBSTRUCTIVE PULMONARY DISEASE) (HCC): Primary | ICD-10-CM

## 2018-10-12 PROCEDURE — 99284 EMERGENCY DEPT VISIT MOD MDM: CPT

## 2018-10-12 RX ORDER — ALBUTEROL SULFATE 90 UG/1
2 AEROSOL, METERED RESPIRATORY (INHALATION) EVERY 4 HOURS PRN
Qty: 1 INHALER | Refills: 0 | Status: SHIPPED | OUTPATIENT
Start: 2018-10-12 | End: 2021-05-25

## 2018-10-12 RX ORDER — PREDNISONE 20 MG/1
40 TABLET ORAL ONCE
Status: COMPLETED | OUTPATIENT
Start: 2018-10-12 | End: 2018-10-12

## 2018-10-12 RX ORDER — PREDNISONE 20 MG/1
40 TABLET ORAL DAILY
Qty: 8 TABLET | Refills: 0 | Status: SHIPPED | OUTPATIENT
Start: 2018-10-12 | End: 2018-10-16

## 2018-10-12 RX ORDER — ALBUTEROL SULFATE 90 UG/1
2 AEROSOL, METERED RESPIRATORY (INHALATION) ONCE
Status: COMPLETED | OUTPATIENT
Start: 2018-10-12 | End: 2018-10-12

## 2018-10-12 RX ORDER — IPRATROPIUM BROMIDE AND ALBUTEROL SULFATE 2.5; .5 MG/3ML; MG/3ML
3 SOLUTION RESPIRATORY (INHALATION) ONCE
Status: COMPLETED | OUTPATIENT
Start: 2018-10-12 | End: 2018-10-12

## 2018-10-12 RX ADMIN — PREDNISONE 40 MG: 20 TABLET ORAL at 20:18

## 2018-10-12 RX ADMIN — IPRATROPIUM BROMIDE AND ALBUTEROL SULFATE 3 ML: .5; 3 SOLUTION RESPIRATORY (INHALATION) at 20:18

## 2018-10-12 RX ADMIN — ALBUTEROL SULFATE 2 PUFF: 90 AEROSOL, METERED RESPIRATORY (INHALATION) at 20:50

## 2018-10-12 NOTE — PROGRESS NOTES
Communication: Initial follow-up call after hospital discharge  Date of Discharge: 10/11/18  Readmission Date: 10/12/18 ED    Identified Risk for Readmission Per Risk Stratification: Medium Risk  Current Disposition: Home  Has patient seen PCP since discharge? No  Has patient seen Pulmonologist since discharge? No    History   Smoking Status    Current Every Day Smoker    Packs/day: 1 50   Smokeless Tobacco    Never Used        mMrc Scale Rating:  II -- Walks slower than people of the same age on level ground because of SOB and/or have to stop for breath    Counseling and Topics Reviewed:  Pulmonary Rehab, Medication adherence and Physician follow-up, COPD care number given      Narrative Summary: (ie: respiratory status, sputum production, persistent wheezing or coughing)  Patient returned my call and stated he left [facility] due to losing his wallet and needing to inform law enforcement  He returned to seek further care and had a confrontation with security after leaving the ED to go to the cafeteria and returning to the ED  Patient is now willing to co-operate and believes he needs rehab  His breathing improved after nebulizer therapy  He desires to quit smoking and improve his health  We discussed the benefits of tobacco cessation with the resources available to assist with cessation difficulty  I will send a COPD booklet to his home

## 2018-10-12 NOTE — ED PROVIDER NOTES
History  Chief Complaint   Patient presents with    Shortness of Breath     seen here recently for copd  states was never supposed to leave, meds were supposed to change somehow, unsure what he is supposed to take, did not take any meds today feels emma        used: No    Shortness of Breath   Severity:  Mild  Onset quality:  Gradual  Duration:  3 days  Timing:  Intermittent  Progression:  Waxing and waning  Chronicity:  Recurrent  Context: smoke exposure and URI    Relieved by:  None tried  Worsened by:  Nothing  Ineffective treatments:  None tried  Associated symptoms: cough and wheezing    Associated symptoms: no abdominal pain, no chest pain, no fever, no neck pain, no rash, no sore throat and no sputum production    Risk factors: tobacco use        Prior to Admission Medications   Prescriptions Last Dose Informant Patient Reported? Taking?    amLODIPine (NORVASC) 5 mg tablet   Yes No   Sig: Take 5 mg by mouth daily   aspirin (ECOTRIN LOW STRENGTH) 81 mg EC tablet   Yes No   Sig: Take 81 mg by mouth daily   baclofen 10 mg tablet   No No   Sig: Take 1 tablet (10 mg total) by mouth 3 (three) times a day for 3 days   clopidogrel (PLAVIX) 75 mg tablet   Yes No   Sig: Take 75 mg by mouth daily   famotidine (PEPCID) 20 mg tablet   Yes No   Sig: Take 20 mg by mouth 2 (two) times a day   fluticasone-salmeterol (ADVAIR) 500-50 mcg/dose   Yes No   Sig: Inhale 1 puff every 12 (twelve) hours   gabapentin (NEURONTIN) 300 mg capsule   Yes No   Sig: Take 100 mg by mouth 3 (three) times a day   lidocaine (LIDODERM) 5 %   No No   Sig: Place 1 patch on the skin daily for 10 days Remove & Discard patch within 12 hours or as directed by MD   lidocaine (XYLOCAINE) 2 % topical gel   No No   Sig: Apply 1 application topically 3 (three) times a day for 10 days   metFORMIN (GLUCOPHAGE) 1000 MG tablet   Yes No   Sig: Take 1,000 mg by mouth 2 (two) times a day with meals   metoprolol tartrate (LOPRESSOR) 25 mg tablet   Yes No   Sig: Take 50 mg by mouth daily     naltrexone (REVIA) 50 mg tablet   Yes No   Sig: Take 50 mg by mouth daily   naproxen (NAPROSYN) 500 mg tablet   No No   Sig: Take 1 tablet (500 mg total) by mouth 2 (two) times a day with meals   naproxen sodium (ALEVE) 220 MG tablet   Yes No   Sig: Take 220 mg by mouth every 12 (twelve) hours as needed for mild pain   ranolazine (RANEXA) 500 mg 12 hr tablet   Yes No   Sig: Take 500 mg by mouth 2 (two) times a day   rosuvastatin (CRESTOR) 20 MG tablet   Yes No   Sig: Take 20 mg by mouth daily   sertraline (ZOLOFT) 100 mg tablet   Yes No   Sig: Take 50 mg by mouth daily      Facility-Administered Medications: None       Past Medical History:   Diagnosis Date    Cancer [facility])     prostate ca,had radiation    Cardiac disease     stents,then triple bypass    COPD [facility]chronic obstructive pulmonary disease) ([facility] [address] )     Diabetes mellitus ([facility] )     ETOH abuse     Hx of heart artery stent     2014    Hyperlipidemia     Hypertension     Prostate CA ([facility] )     Renal disorder     pyelonephritis    S/P CABG x 1     2004       Past Surgical History:   Procedure Laterality Date    TONSILLECTOMY         History reviewed  No pertinent family history  I have reviewed and agree with the history as documented  Social History   Substance Use Topics    Smoking status: Current Every Day Smoker     Packs/day: 1 50    Smokeless tobacco: Never Used    Alcohol use Yes      Comment:  bottle of vodka a day  last drink this afternoon        Review of Systems   Constitutional: Negative for activity change, appetite change, chills, fatigue and fever  HENT: Negative for congestion, dental problem, facial swelling, sore throat, tinnitus and trouble swallowing  Eyes: Negative for pain, discharge and itching  Respiratory: Positive for cough, shortness of breath and wheezing  Negative for apnea, sputum production and chest tightness      Cardiovascular: Negative for chest pain, palpitations and leg swelling  Gastrointestinal: Negative for abdominal pain and nausea  Genitourinary: Negative for difficulty urinating, dysuria and flank pain  Musculoskeletal: Negative for arthralgias, back pain, gait problem, joint swelling and neck pain  Skin: Negative for color change, rash and wound  Neurological: Negative for dizziness and facial asymmetry  Psychiatric/Behavioral: Negative for agitation and behavioral problems  The patient is not nervous/anxious  All other systems reviewed and are negative  Physical Exam  Physical Exam   Constitutional: He is oriented to person, place, and time  He appears well-developed and well-nourished  No distress  HENT:   Head: Normocephalic and atraumatic  Right Ear: External ear normal    Left Ear: External ear normal    Eyes: Pupils are equal, round, and reactive to light  EOM are normal  Right eye exhibits no discharge  Left eye exhibits no discharge  Neck: Normal range of motion  Neck supple  No tracheal deviation present  No thyromegaly present  Cardiovascular: Normal rate and regular rhythm  No murmur heard  Pulmonary/Chest: Effort normal  He has wheezes  Abdominal: Soft  Bowel sounds are normal  He exhibits no distension  There is no tenderness  Musculoskeletal: Normal range of motion  He exhibits no edema or deformity  Neurological: He is alert and oriented to person, place, and time  No cranial nerve deficit  He exhibits normal muscle tone  Skin: Skin is warm  Capillary refill takes less than 2 seconds  He is not diaphoretic  Psychiatric: He has a normal mood and affect  His behavior is normal    Nursing note and vitals reviewed        Vital Signs  ED Triage Vitals [10/12/18 1936]   Temperature Pulse Respirations Blood Pressure SpO2   98 3 °F (36 8 °C) 100 (!) 24 144/72 96 %      Temp Source Heart Rate Source Patient Position - Orthostatic VS BP Location FiO2 (%)   Tympanic -- Sitting Right arm --      Pain Score No Pain           Vitals:    10/12/18 1936 10/12/18 2000 10/12/18 2030 10/12/18 2051   BP: 144/72 131/63 128/70 128/70   Pulse: 100 90 90 98   Patient Position - Orthostatic VS: Sitting   Lying       Visual Acuity      ED Medications  Medications   ipratropium-albuterol (DUO-NEB) 0 5-2 5 mg/3 mL inhalation solution 3 mL (3 mL Nebulization Given 10/12/18 2018)   predniSONE tablet 40 mg (40 mg Oral Given 10/12/18 2018)   albuterol (PROVENTIL HFA,VENTOLIN HFA) inhaler 2 puff (2 puffs Inhalation Given 10/12/18 2050)       Diagnostic Studies  Results Reviewed     None                 No orders to display              Procedures  Procedures       Phone Contacts  ED Phone Contact    ED Course                               MDM  Number of Diagnoses or Management Options  COPD (chronic obstructive pulmonary disease) ([facility] [address] ): new and does not require workup  Diagnosis management comments: Patient with recent [place] discharge from the hospital presents emergency department for evaluation of continued wheeze  States overall improved since discharge  Thought his medications because be changed which is the main reason he came to the emergency department  Denies any chest pain  Shortness of breath mild  Denies any fever  Vital signs stable  Patient with mild end-expiratory wheeze  Overall appears well, speaking full sentences  Ambulatory at his baseline  DuoNeb given, prednisone for mild COPD exacerbation  Has 1 day left of moxifloxacin which he states he is able to take tonight  Albuterol 2 puffs given to patient, 4 day course of prednisone at home, albuterol p r n , strict instructions to follow up with his primary care doctor for long-term management of chronic medical conditions         Amount and/or Complexity of Data Reviewed  Review and summarize past medical records: yes    Risk of Complications, Morbidity, and/or Mortality  Presenting problems: moderate  Diagnostic procedures: low  Management options: moderate    Patient Progress  Patient progress: improved    CritCare Time    Disposition  Final diagnoses:   COPD (chronic obstructive pulmonary disease) ([address] )     Time reflects when diagnosis was documented in both MDM as applicable and the Disposition within this note     Time User Action Codes Description Comment    10/12/2018  8:49 PM Marylee Marshal Add [J44 9] COPD (ch[facility] obstructive pulmonary disease) [facility])       ED Disposition     ED Disposition Condition Comment    Discharge  Pervis Muscat discharge to home/self care  Condition at discharge: Stable        Follow-up Information     Follow up With Specialties Details Why Contact Info    Prosper Servin  Schedule an appointment as soon as possible for a visit in 3 days As needed, If symptoms worsen [address] [facility]  [address]            Patient's Medications   Discharge Prescriptions    ALBUTEROL (PROVENTIL HFA,VENTOLIN HFA) 90 MCG/ACT INHALER    Inhale 2 puffs every 4 (four) hours as needed for wheezing       Start Date: 10/12/2018End Date: --       Order Dose: 2 puffs       Quantity: 1 Inhaler    Refills: 0    PREDNISONE 20 MG TABLET    Take 2 tablets (40 mg total) by mouth daily for 4 days       Start Date: 10/12/2018End Date: 10/16/2018       Order Dose: 40 mg       Quantity: 8 tablet    Refills: 0     No discharge procedures on file      ED Provider  Electronically Signed by           Charlene Alberto MD  10/12/18 7428

## 2018-10-13 NOTE — ED PROVIDER NOTES
History  Chief Complaint   Patient presents with    Fever - 9 weeks to 76 years     States he was admitted today with pneumonia  States he had lost his wallet and signed out AMA to take care of financial matters  States he stopped drinking 2 days ago and he does not feel well, gait is unsteady and has mild tremor  Patient eloped before being seen  Fever - 9 weeks to 74 years       Prior to Admission Medications   Prescriptions Last Dose Informant Patient Reported? Taking?    amLODIPine (NORVASC) 5 mg tablet   Yes No   Sig: Take 5 mg by mouth daily   aspirin (ECOTRIN LOW STRENGTH) 81 mg EC tablet   Yes No   Sig: Take 81 mg by mouth daily   baclofen 10 mg tablet   No No   Sig: Take 1 tablet (10 mg total) by mouth 3 (three) times a day for 3 days   clopidogrel (PLAVIX) 75 mg tablet   Yes No   Sig: Take 75 mg by mouth daily   famotidine (PEPCID) 20 mg tablet   Yes No   Sig: Take 20 mg by mouth 2 (two) times a day   fluticasone-salmeterol (ADVAIR) 500-50 mcg/dose   Yes No   Sig: Inhale 1 puff every 12 (twelve) hours   gabapentin (NEURONTIN) 300 mg capsule   Yes No   Sig: Take 100 mg by mouth 3 (three) times a day   lidocaine (LIDODERM) 5 %   No No   Sig: Place 1 patch on the skin daily for 10 days Remove & Discard patch within 12 hours or as directed by MD   lidocaine (XYLOCAINE) 2 % topical gel   No No   Sig: Apply 1 application topically 3 (three) times a day for 10 days   metFORMIN (GLUCOPHAGE) 1000 MG tablet   Yes No   Sig: Take 1,000 mg by mouth 2 (two) times a day with meals   metoprolol tartrate (LOPRESSOR) 25 mg tablet   Yes No   Sig: Take 50 mg by mouth daily     naltrexone (REVIA) 50 mg tablet   Yes No   Sig: Take 50 mg by mouth daily   naproxen (NAPROSYN) 500 mg tablet   No No   Sig: Take 1 tablet (500 mg total) by mouth 2 (two) times a day with meals   naproxen sodium (ALEVE) 220 MG tablet   Yes No   Sig: Take 220 mg by mouth every 12 (twelve) hours as needed for mild pain   ranolazine (RANEXA) 500 mg 12 hr tablet   Yes No   Sig: Take 500 mg by mouth 2 (two) times a day   rosuvastatin (CRESTOR) 20 MG tablet   Yes No   Sig: Take 20 mg by mouth daily   sertraline (ZOLOFT) 100 mg tablet   Yes No   Sig: Take 50 mg by mouth daily      Facility-Administered Medications: None       Past Medical History:   Diagnosis Date    Cancer [facility])     prostate ca,had radiation    Cardiac disease     stents,then triple bypass    COPD (chronic obstructive pulmonary disease) ([place] [address] )     Diabetes mellitus ([place] [address] )     ETOH abuse     Hx of heart artery stent     2014    Hyperlipidemia     Hypertension     [facility] ([facility] )     Renal disorder     pyelonephritis    S/P CABG x 1     2004       Past Surgical History:   Procedure Laterality Date    TONSILLECTOMY         History reviewed  No pertinent family history  I have reviewed and agree with the history as documented  Social History   Substance Use Topics    Smoking status: Current Every Day Smoker     Packs/day: 1 50    Smokeless tobacco: Never Used    Alcohol use Yes      Comment:  bottle of vodka a day  last drink this afternoon        Review of Systems   Constitutional: Positive for fever         Physical Exam  Physical Exam    Vital Signs  ED Triage Vitals [10/11/18 1403]   Temperature Pulse Respirations Blood Pressure SpO2   98 1 °F (36 7 °C) 80 18 134/72 94 %      Temp Source Heart Rate Source Patient Position - Orthostatic VS BP Location FiO2 (%)   Tympanic Monitor Sitting Left arm --      Pain Score       --           Vitals:    10/11/18 1403   BP: 134/72   Pulse: 80   Patient Position - Orthostatic VS: Sitting       Visual Acuity      ED Medications  Medications - No data to display    Diagnostic Studies  Results Reviewed     None                 No orders to display              Procedures  Procedures       Phone Contacts  ED Phone Contact    ED Course  ED Course as of Oct 12 2300   Thu Oct 11, 2018   1501 Went to evaluate the patient and he walked out of ambulance bay doors and eloped                                  MDM  CritCare Time    Disposition  Final diagnoses:   COPD (chronic obstructive pulmonary disease) ([facility] [address] )     Time reflects when diagnosis was documented in both MDM as applicable and the Disposition within this note     Time User Action Codes Description Comment    10/11/2018  5:07 PM Erika Krishna [J44 9] COPD (chronic obstructive pulmonary disease) [facility])       ED Disposition     ED Disposition Condition Comment    LWBS after Triage        Follow-up Information    None         Discharge Medication List as of 10/11/2018  3:10 PM      CONTINUE these medications which have NOT CHANGED    Details   amLODIPine (NORVASC) 5 mg tablet Take 5 mg by mouth daily, Historical Med      aspirin (ECOTRIN LOW STRENGTH) 81 mg EC tablet Take 81 mg by mouth daily, Historical Med      baclofen 10 mg tablet Take 1 tablet (10 mg total) by mouth 3 (three) times a day for 3 days, Starting Fri 4/27/2018, Until Mon 4/30/2018, Print      clopidogrel (PLAVIX) 75 mg tablet Take 75 mg by mouth daily, Historical Med      famotidine (PEPCID) 20 mg tablet Take 20 mg by mouth 2 (two) times a day, Historical Med      fluticasone-salmeterol (ADVAIR) 500-50 mcg/dose Inhale 1 puff every 12 (twelve) hours, Historical Med      gabapentin (NEURONTIN) 300 mg capsule Take 100 mg by mouth 3 (three) times a day, Historical Med      lidocaine (LIDODERM) 5 % Place 1 patch on the skin daily for 10 days Remove & Discard patch within 12 hours or as directed by MD, Starting Fri 4/27/2018, Until Mon 5/7/2018, Print      lidocaine (XYLOCAINE) 2 % topical gel Apply 1 application topically 3 (three) times a day for 10 days, Starting Fri 4/27/2018, Until Mon 5/7/2018, Print      metFORMIN (GLUCOPHAGE) 1000 MG tablet Take 1,000 mg by mouth 2 (two) times a day with meals, Historical Med      metoprolol tartrate (LOPRESSOR) 25 mg tablet Take 50 mg by mouth daily  , Historical Med      naltrexone (REVIA) 50 mg tablet Take 50 mg by mouth daily, Historical Med      naproxen (NAPROSYN) 500 mg tablet Take 1 tablet (500 mg total) by mouth 2 (two) times a day with meals, Starting Wed 4/18/2018, Print      naproxen sodium (ALEVE) 220 MG tablet Take 220 mg by mouth every 12 (twelve) hours as needed for mild pain, Historical Med      ranolazine (RANEXA) 500 mg 12 hr tablet Take 500 mg by mouth 2 (two) times a day, Historical Med      rosuvastatin (CRESTOR) 20 MG tablet Take 20 mg by mouth daily, Historical Med      sertraline (ZOLOFT) 100 mg tablet Take 50 mg by mouth daily, Historical Med           No discharge procedures on file      ED Provider  Electronically Signed by           Alma Reyes DO  10/12/18 6580

## 2018-10-13 NOTE — ED NOTES
Pt required a transport  Nurse supervisor is notified  [place] is schedule  Will be here at 21 30  Pt made aware to answer phone when [place] call his cell  Pt verbalized understanding        Nirmal Munoz RN  10/12/18 2112

## 2018-10-13 NOTE — DISCHARGE INSTRUCTIONS
Take prednisone as prescribed  Take last dose of antibiotics at home  Take COPD inhalers as prescribed  COPD (Chronic Obstructive Pulmonary Disease)   WHAT YOU NEED TO KNOW:   Chronic obstructive pulmonary disease (COPD) is a lung disease that makes it hard for you to breathe  It is usually a result of lung damage caused by years of irritation and inflammation in your lungs  DISCHARGE INSTRUCTIONS:   Call 911 if:   · You feel lightheaded, short of breath, and have chest pain  Seek care immediately if:   · You are confused, dizzy, or feel faint  · Your arm or leg feels warm, tender, and painful  It may look swollen and red  · You cough up blood  Contact your healthcare provider if:   · You have more shortness of breath than usual      · You need more medicine than usual to control your symptoms  · You are coughing or wheezing more than usual      · You are coughing up more mucus, or it is a different color or has a different odor  · You gain more than 3 pounds in a week  · You have a fever, a runny or stuffy nose, and a sore throat, or other cold or flu symptoms  · Your skin, lips, or nails start to turn blue  · You have swelling in your legs or ankles  · You are very tired or weak for more than a day  · You notice changes in your mood, or changes in your ability to think or concentrate  · You have questions or concerns about your condition or care  Medicines:   · Medicines  may be used to open your airways, decrease swelling and inflammation in your lungs, or treat an infection  You may need 2 or more medicines  A short-acting medicine relieves symptoms quickly  Long-acting medicines will control or prevent symptoms  Ask for more information about the medicines you are given and how to use them safely  · Take your medicine as directed  Contact your healthcare provider if you think your medicine is not helping or if you have side effects   Tell him or her if you are allergic to any medicine  Keep a list of the medicines, vitamins, and herbs you take  Include the amounts, and when and why you take them  Bring the list or the pill bottles to follow-up visits  Carry your medicine list with you in case of an emergency  Help make breathing easier:   · Use pursed-lip breathing any time you feel short of breath  Take a deep breath in through your nose  Slowly breathe out through your mouth with your lips pursed for twice as long as you inhaled  You can also practice this breathing pattern while you bend, lift, climb stairs, or exercise  It slows down your breathing and helps move more air in and out of your lungs  · Do not smoke, and avoid others who smoke  Nicotine and other substances can cause lung irritation or damage and make it harder for you to breathe  Do not use e-cigarettes or smokeless tobacco  They still contain nicotine  Ask your healthcare provider for information if you currently smoke and need help to quit  For support and more information:  ¨ [employer]  Phone: 5- 289 - 691-5819  Web Address: [employer]      · Be aware of and avoid anything that makes your symptoms worse  Stay out of high altitudes and places with high humidity  Stay inside, or cover your mouth and nose with a scarf when you are outside during cold weather  Stay inside on days when air pollution or pollen counts are high  Do not use aerosol sprays such as deodorant, bug spray, and hair spray  Manage COPD and help prevent exacerbations:  COPD is a serious condition that gets worse over time  A COPD exacerbation means your symptoms suddenly get worse  It is important to prevent exacerbations  An exacerbation can cause more lung damage  COPD cannot be cured, but you can take action to feel better and prevent COPD exacerbations:  · Protect yourself from germs  Germs can get into your lungs and cause an infection   An infection in your lungs can create more mucus and make it harder to breathe  An infection can also create swelling in your airways and prevent air from getting in  You can decrease your risk for infection by doing the following:     [facility] your hands often with soap and water  Carry germ-killing gel with you  You can use the gel to clean your hands when soap and water are not available  ¨ Do not touch your eyes, nose, or mouth unless you have washed your hands first      ¨ Always cover your mouth when you cough  Cough into a tissue or your shirtsleeve so you do not spread germs from your hands  ¨ Try to avoid people who have a cold or the flu  If you are sick, stay away from others as much as possible  · Drink more liquids  This will help to keep your air passages moist and help you cough up mucus  Ask how much liquid to drink each day and which liquids are best for you  · Exercise daily  Exercise for at least 20 minutes each day to help increase your energy and decrease shortness of breath  Walking or riding a bike are good ways to exercise  Talk to your healthcare provider about the best exercise plan for you  · Ask about vaccines  Your healthcare provider may recommend that you get regular flu and pneumonia vaccines  Pneumonia can become life-threatening for a person who has COPD  Ask about other vaccines you may need  Ask your healthcare provider about the flu and pneumonia vaccines  All adults should get the flu (influenza) vaccine every year as soon as it becomes available  The pneumonia vaccine is given to adults aged 72 or older to prevent pneumococcal disease, such as pneumonia  Adults aged 23 to 59 years who are at high risk for pneumococcal disease also should get the pneumococcal vaccine  It may need to be repeated 1 or 5 years later  Pulmonary rehabilitation:  Your healthcare provider may recommend a program to help you manage your symptoms and improve your quality of life   It may include nutritional counseling and exercise to strengthen your lungs  Make decisions about your choices for future treatment:  Ask for information about advanced medical directives and living mitchell  These documents help you decide and write down your choices for treatment and end-of-life care  It is best to complete them when you feel well and can think clearly about your wishes  The information can then be kept for future use if you are in the hospital or become very ill  Follow up with your healthcare provider as directed: You may need more tests  Your healthcare provider may refer you to a pulmonary (lung) specialist  Write down your questions so you remember to ask them during your visits  © 2017 [employer]  Information is for End User's use only and may not be sold, redistributed or otherwise used for commercial purposes  All illustrations and images included in CareNotes® are the copyrighted property of [employer]  or Mick Milner  The above information is an  only  It is not intended as medical advice for individual conditions or treatments  Talk to your doctor, nurse or pharmacist before following any medical regimen to see if it is safe and effective for you

## 2018-10-14 LAB
BACTERIA BLD CULT: NORMAL
BACTERIA BLD CULT: NORMAL

## 2018-10-29 ENCOUNTER — PATIENT OUTREACH (OUTPATIENT)
Dept: PULMONOLOGY | Age: 58
End: 2018-10-29

## 2018-12-03 ENCOUNTER — PATIENT OUTREACH (OUTPATIENT)
Dept: OTHER | Facility: HOSPITAL | Age: 58
End: 2018-12-03

## 2018-12-28 ENCOUNTER — EPISODE CHANGES (OUTPATIENT)
Dept: CASE MANAGEMENT | Facility: HOSPITAL | Age: 58
End: 2018-12-28

## 2019-01-08 ENCOUNTER — PATIENT OUTREACH (OUTPATIENT)
Dept: CASE MANAGEMENT | Facility: OTHER | Age: 59
End: 2019-01-08

## 2019-01-15 ENCOUNTER — PATIENT OUTREACH (OUTPATIENT)
Dept: CASE MANAGEMENT | Facility: HOSPITAL | Age: 59
End: 2019-01-15

## 2019-01-15 NOTE — PROGRESS NOTES
Pt was admitted to [facility] back in October for AE COPD and left AMA  Reports that his mother passed away shortly after this discharge  He called our office today looking for guidance on finding a PCP and a cardiologist (significant cardiac history including CABG in his 44's)  Pt was given the numbers of [employer] for PCP guidance and the number of the cardiology practice at [address] [place] [address]  We also reviewed the inhalers he has on hand for his COPD: Advair and prn Ventolin and he was given guidance on correct utilization  Pt was instructed to call this number again if he has difficulty with scheduling these needed appointments

## 2019-03-07 ENCOUNTER — HOSPITAL ENCOUNTER (EMERGENCY)
Facility: HOSPITAL | Age: 59
Discharge: HOME | End: 2019-03-07
Attending: EMERGENCY MEDICINE | Admitting: EMERGENCY MEDICINE
Payer: MEDICARE

## 2019-03-07 ENCOUNTER — APPOINTMENT (EMERGENCY)
Dept: RADIOLOGY | Facility: HOSPITAL | Age: 59
End: 2019-03-07
Payer: MEDICARE

## 2019-03-07 VITALS
DIASTOLIC BLOOD PRESSURE: 62 MMHG | RESPIRATION RATE: 18 BRPM | OXYGEN SATURATION: 100 % | TEMPERATURE: 98 F | HEART RATE: 85 BPM | SYSTOLIC BLOOD PRESSURE: 122 MMHG | BODY MASS INDEX: 30.17 KG/M2 | WEIGHT: 235 LBS

## 2019-03-07 DIAGNOSIS — J44.9 COPD (CHRONIC OBSTRUCTIVE PULMONARY DISEASE) (HCC): ICD-10-CM

## 2019-03-07 DIAGNOSIS — J02.9 PHARYNGITIS: Primary | ICD-10-CM

## 2019-03-07 LAB — S PYO AG THROAT QL: NEGATIVE

## 2019-03-07 PROCEDURE — 87430 STREP A AG IA: CPT | Performed by: PHYSICIAN ASSISTANT

## 2019-03-07 PROCEDURE — 99283 EMERGENCY DEPT VISIT LOW MDM: CPT

## 2019-03-07 PROCEDURE — 87070 CULTURE OTHR SPECIMN AEROBIC: CPT | Performed by: PHYSICIAN ASSISTANT

## 2019-03-07 PROCEDURE — 71045 X-RAY EXAM CHEST 1 VIEW: CPT

## 2019-03-07 PROCEDURE — 94640 AIRWAY INHALATION TREATMENT: CPT

## 2019-03-07 RX ORDER — IPRATROPIUM BROMIDE AND ALBUTEROL SULFATE 2.5; .5 MG/3ML; MG/3ML
3 SOLUTION RESPIRATORY (INHALATION) ONCE
Status: COMPLETED | OUTPATIENT
Start: 2019-03-07 | End: 2019-03-07

## 2019-03-07 RX ORDER — BENZONATATE 100 MG/1
100 CAPSULE ORAL 3 TIMES DAILY PRN
Qty: 20 CAPSULE | Refills: 0 | Status: SHIPPED | OUTPATIENT
Start: 2019-03-07 | End: 2019-10-17

## 2019-03-07 RX ORDER — GUAIFENESIN/DEXTROMETHORPHAN 100-10MG/5
10 SYRUP ORAL ONCE
Status: COMPLETED | OUTPATIENT
Start: 2019-03-07 | End: 2019-03-07

## 2019-03-07 RX ORDER — DEXAMETHASONE 4 MG/1
10 TABLET ORAL ONCE
Status: COMPLETED | OUTPATIENT
Start: 2019-03-07 | End: 2019-03-07

## 2019-03-07 RX ORDER — ALBUTEROL SULFATE 2.5 MG/3ML
2.5 SOLUTION RESPIRATORY (INHALATION) EVERY 6 HOURS PRN
Qty: 75 ML | Refills: 0 | Status: SHIPPED | OUTPATIENT
Start: 2019-03-07 | End: 2019-10-17

## 2019-03-07 RX ORDER — ALBUTEROL SULFATE 90 UG/1
2 AEROSOL, METERED RESPIRATORY (INHALATION) EVERY 6 HOURS PRN
Qty: 1 INHALER | Refills: 0 | Status: SHIPPED | OUTPATIENT
Start: 2019-03-07 | End: 2019-04-06

## 2019-03-07 RX ADMIN — DEXAMETHASONE 10 MG: 4 TABLET ORAL at 17:57

## 2019-03-07 RX ADMIN — GUAIFENESIN AND DEXTROMETHORPHAN 10 ML: 100; 10 SYRUP ORAL at 18:07

## 2019-03-07 RX ADMIN — IPRATROPIUM BROMIDE AND ALBUTEROL SULFATE 3 ML: 2.5; .5 SOLUTION RESPIRATORY (INHALATION) at 18:01

## 2019-03-07 NOTE — ED PROVIDER NOTES
History  Chief Complaint   Patient presents with    Sore Throat     sore throat x 2 days     A 28-year-old male presenting today with a sore throat over the past 2 days has gradually worsened  Is able to swallow foods and liquids without difficulty however with discomfort  He has history of COPD and has an ongoing cough that has slightly worsened is mildly productive at times  Does not use any type of nebulizers at home  Has been taking over-the-counter medications with minimal relief  States that his voice is very hoarse  No facial swelling  Denies shortness of breath, chest pain, abdominal pain, fevers, chills, headache, neck pain or stiffness, abdominal pain, nausea, vomiting or diarrhea  Prior to Admission Medications   Prescriptions Last Dose Informant Patient Reported? Taking?    albuterol (PROVENTIL HFA,VENTOLIN HFA) 90 mcg/act inhaler   No No   Sig: Inhale 2 puffs every 4 (four) hours as needed for wheezing   amLODIPine (NORVASC) 5 mg tablet   Yes No   Sig: Take 5 mg by mouth daily   aspirin (ECOTRIN LOW STRENGTH) 81 mg EC tablet   Yes No   Sig: Take 81 mg by mouth daily   baclofen 10 mg tablet   No No   Sig: Take 1 tablet (10 mg total) by mouth 3 (three) times a day for 3 days   clopidogrel (PLAVIX) 75 mg tablet   Yes No   Sig: Take 75 mg by mouth daily   famotidine (PEPCID) 20 mg tablet   Yes No   Sig: Take 20 mg by mouth 2 (two) times a day   fluticasone-salmeterol (ADVAIR) 500-50 mcg/dose   Yes No   Sig: Inhale 1 puff every 12 (twelve) hours   gabapentin (NEURONTIN) 300 mg capsule   Yes No   Sig: Take 100 mg by mouth 3 (three) times a day   lidocaine (LIDODERM) 5 %   No No   Sig: Place 1 patch on the skin daily for 10 days Remove & Discard patch within 12 hours or as directed by MD   lidocaine (XYLOCAINE) 2 % topical gel   No No   Sig: Apply 1 application topically 3 (three) times a day for 10 days   metFORMIN (GLUCOPHAGE) 1000 MG tablet   Yes No   Sig: Take 1,000 mg by mouth 2 (two) times a day with meals   metoprolol tartrate (LOPRESSOR) 25 mg tablet   Yes No   Sig: Take 50 mg by mouth daily     naltrexone (REVIA) 50 mg tablet   Yes No   Sig: Take 50 mg by mouth daily   naproxen (NAPROSYN) 500 mg tablet   No No   Sig: Take 1 tablet (500 mg total) by mouth 2 (two) times a day with meals   naproxen sodium (ALEVE) 220 MG tablet   Yes No   Sig: Take 220 mg by mouth every 12 (twelve) hours as needed for mild pain   ranolazine (RANEXA) 500 mg 12 hr tablet   Yes No   Sig: Take 500 mg by mouth 2 (two) times a day   rosuvastatin (CRESTOR) 20 MG tablet   Yes No   Sig: Take 20 mg by mouth daily   sertraline (ZOLOFT) 100 mg tablet   Yes No   Sig: Take 50 mg by mouth daily      Facility-Administered Medications: None       Past Medical History:   Diagnosis Date    Cancer [facility])     prostate ca,had radiation    Cardiac disease     stents,then triple bypass    [facility]chronic obstructive pulmonary disease) [facility] )     Diabetes mellitus ([facility] )     ETOH abuse     Hx of heart artery stent     2014    Hyperlipidemia     Hypertension     Prostate CA ([facility] )     Renal disorder     pyelonephritis    S/P CABG x 1     2004       Past Surgical History:   Procedure Laterality Date    TONSILLECTOMY         History reviewed  No pertinent family history  I have reviewed and agree with the history as documented  Social History     Tobacco Use    Smoking status: Current Every Day Smoker     Packs/day: 1 50    Smokeless tobacco: Never Used   Substance Use Topics    Alcohol use: Yes     Comment:  bottle of vodka a day  last drink this afternoon    Drug use: No        Review of Systems   Constitutional: Negative  Negative for appetite change, chills and fever  HENT: Positive for sore throat  Negative for congestion, dental problem, ear pain, facial swelling, mouth sores, postnasal drip, sinus pressure and trouble swallowing  Eyes: Negative  Respiratory: Positive for cough   Negative for apnea, choking, chest tightness, shortness of breath and wheezing  Cardiovascular: Negative  Negative for chest pain  Gastrointestinal: Negative  Negative for abdominal distention, abdominal pain, blood in stool, constipation, diarrhea, nausea and vomiting  Genitourinary: Negative  Musculoskeletal: Negative  Negative for arthralgias, neck pain and neck stiffness  Skin: Negative  Negative for rash  Neurological: Negative  Negative for facial asymmetry and headaches  All other systems reviewed and are negative  Physical Exam  Physical Exam   Constitutional: He is oriented to person, place, and time  He appears well-developed and well-nourished  No distress  HENT:   Head: Normocephalic and atraumatic  Right Ear: External ear normal    Left Ear: External ear normal    Nose: Nose normal    Mouth/Throat: Oropharynx is clear and moist  No oropharyngeal exudate  Mild amount of erythema noted  No swelling, exudate, or uvula deviation noted of mouth  No discoloration, asymmetries or swelling of the face  No ulcerations, fluctuance, induration or drainage noted  No petechiae noted on palate  Able to swallow without difficulty  Full ROM of jaw  Eyes: Pupils are equal, round, and reactive to light  Conjunctivae are normal  Right eye exhibits no discharge  Left eye exhibits no discharge  No scleral icterus  Neck: Normal range of motion  Neck supple  No tracheal deviation present  Cardiovascular: Normal rate, regular rhythm, normal heart sounds and intact distal pulses  Exam reveals no friction rub  No murmur heard  Pulmonary/Chest: Effort normal  No stridor  No respiratory distress  He has wheezes  He has no rales  He exhibits no tenderness  SpO2 is 100%, within normal limits, resting comfortably  No cyanosis or pallor  Diffuse end expiratory wheezing in all lung fields   Abdominal: Soft  Bowel sounds are normal  He exhibits no distension  There is no tenderness   There is no rebound and no guarding  Lymphadenopathy:     He has no cervical adenopathy  Neurological: He is alert and oriented to person, place, and time  Skin: Skin is warm and dry  No rash noted  He is not diaphoretic  No erythema  No pallor  No sandpaper rash noted  No other rashes noted  Good coloration of skin  Nursing note and vitals reviewed  Vital Signs  ED Triage Vitals [03/07/19 1735]   Temperature Pulse Respirations Blood Pressure SpO2   98 °F (36 7 °C) 85 18 122/62 100 %      Temp Source Heart Rate Source Patient Position - Orthostatic VS BP Location FiO2 (%)   Tympanic Monitor Sitting Right arm --      Pain Score       3           Vitals:    03/07/19 1735   BP: 122/62   Pulse: 85   Patient Position - Orthostatic VS: Sitting       Visual Acuity      ED Medications  Medications   dexamethasone (DECADRON) tablet 10 mg (10 mg Oral Given 3/7/19 1757)   dextromethorphan-guaiFENesin (ROBITUSSIN DM)  mg/5 mL oral syrup 10 mL (10 mL Oral Given 3/7/19 1807)   ipratropium-albuterol (DUO-NEB) 0 5-2 5 mg/3 mL inhalation solution 3 mL (3 mL Nebulization Given 3/7/19 1801)   ipratropium-albuterol (DUO-NEB) 0 5-2 5 mg/3 mL inhalation solution 3 mL (3 mL Nebulization Given 3/7/19 1801)       Diagnostic Studies  Results Reviewed     Procedure Component Value Units Date/Time    Rapid Strep A Screen Throat with Reflex to Culture, Pediatrics and Compromised Adults [[id number]]  (Normal) Collected:  03/07/19 1757    Lab Status:  Final result Specimen:  Throat Updated:  03/07/19 1816     Rapid Strep A Screen Negative    Throat culture [[id number]] Collected:  03/07/19 1757    Lab Status:   In process Specimen:  Throat Updated:  03/07/19 1816                 XR chest 1 view portable    (Results Pending)              Procedures  Procedures       Phone Contacts  ED Phone Contact    ED Course                               MDM  Number of Diagnoses or Management Options  COPD (chronic obstructive pulmonary disease) ([facility] ):   Pharyngitis: Diagnosis management comments: Breath sounds improved with nebulizer treatment and patient is feeling much better  Suspect pharyngitis vs laryngitis  Will treat symptomatically, strict return precautions for any worsening symptoms otherwise encouraged to follow up with PCP for re-evaluation  Patient verbalizes understanding agrees with the above assessment plan  Amount and/or Complexity of Data Reviewed  Clinical lab tests: reviewed and ordered  Tests in the radiology section of CPT®: ordered and reviewed  Review and summarize past medical records: yes  Independent visualization of images, tracings, or specimens: yes        Disposition  Final diagnoses:   Pharyngitis   COPD (chronic obstructive pulmonary disease) ([facility] )     Time reflects when diagnosis was documented in both MDM as applicable and the Disposition within this note     Time User Action Codes Description Comment    3/7/2019  6:48 PM Vicente Humphries [J02 9] Pharyngitis     3/7/2019  6:48 PM Vicente Humphries [J44 9] COPD (chronic obstructive pulmonary disease) [facility])       ED Disposition     ED Disposition Condition Date/Time Comment    Discharge Stable u Mar 7, 2019  6:48 PM Sotero Alvarez discharge to home/self care              Follow-up Information     Follow up With Specialties Details Why Contact Info Additional Information    Rony Curry  Schedule an appointment as soon as possible for a visit in 3 days  [address] [place] [address] Emergency Department Emergency Medicine Go to  If symptoms worsen [address] [facility], [place], [place], [address]          Patient's Medications   Discharge Prescriptions    AL MAG OXIDE-DIPHENHYDRAMINE-LIDOCAINE VISCOUS (MAGIC MOUTHWASH) 1:1:1 SUSPENSION    Swish and swallow 10 mL every 4 (four) hours as needed for mouth pain or discomfort       Start Date: 3/7/2019  End Date: -- Order Dose: 10 mL       Quantity: 180 mL    Refills: 0    ALBUTEROL (2 5 MG/3 ML) 0 083 % NEBULIZER SOLUTION    Take 1 vial (2 5 mg total) by nebulization every 6 (six) hours as needed for wheezing       Start Date: 3/7/2019  End Date: --       Order Dose: 2 5 mg       Quantity: 75 mL    Refills: 0    ALBUTEROL (PROVENTIL HFA,VENTOLIN HFA) 90 MCG/ACT INHALER    Inhale 2 puffs every 6 (six) hours as needed for wheezing for up to 30 days       Start Date: 3/7/2019  End Date: 4/6/2019       Order Dose: 2 puffs       Quantity: 1 Inhaler    Refills: 0    BENZONATATE (TESSALON PERLES) 100 MG CAPSULE    Take 1 capsule (100 mg total) by mouth 3 (three) times a day as needed for cough       Start Date: 3/7/2019  End Date: --       Order Dose: 100 mg       Quantity: 20 capsule    Refills: 0     Outpatient Discharge Orders   Nebulizer       ED Provider  Electronically Signed by           Renetta Trujillo PA-C  03/07/19 9112

## 2019-03-09 LAB — BACTERIA THROAT CULT: NORMAL

## 2019-06-01 ENCOUNTER — HOSPITAL ENCOUNTER (EMERGENCY)
Facility: HOSPITAL | Age: 59
Discharge: HOME | End: 2019-06-02
Attending: EMERGENCY MEDICINE
Payer: MEDICARE

## 2019-06-01 ENCOUNTER — APPOINTMENT (EMERGENCY)
Dept: RADIOLOGY | Facility: HOSPITAL | Age: 59
End: 2019-06-01
Attending: EMERGENCY MEDICINE
Payer: MEDICARE

## 2019-06-01 DIAGNOSIS — F10.929 ALCOHOL INTOXICATION (HCC): ICD-10-CM

## 2019-06-01 DIAGNOSIS — R55 NEAR SYNCOPE: Primary | ICD-10-CM

## 2019-06-01 LAB
ALBUMIN SERPL BCP-MCNC: 3.6 G/DL (ref 3.5–5)
ALP SERPL-CCNC: 63 U/L (ref 46–116)
ALT SERPL W P-5'-P-CCNC: 38 U/L (ref 12–78)
ANION GAP SERPL CALCULATED.3IONS-SCNC: 10 MMOL/L (ref 4–13)
ANION GAP SERPL CALCULATED.3IONS-SCNC: 11 MMOL/L (ref 4–13)
AST SERPL W P-5'-P-CCNC: 27 U/L (ref 5–45)
BASOPHILS # BLD AUTO: 0.06 THOUSANDS/ΜL (ref 0–0.1)
BASOPHILS NFR BLD AUTO: 1 % (ref 0–1)
BILIRUB SERPL-MCNC: 0.5 MG/DL (ref 0.2–1)
BILIRUB UR QL STRIP: ABNORMAL
BUN SERPL-MCNC: 31 MG/DL (ref 5–25)
BUN SERPL-MCNC: 31 MG/DL (ref 5–25)
CALCIUM SERPL-MCNC: 7.6 MG/DL (ref 8.3–10.1)
CALCIUM SERPL-MCNC: 8.2 MG/DL (ref 8.3–10.1)
CHLORIDE SERPL-SCNC: 102 MMOL/L (ref 100–108)
CHLORIDE SERPL-SCNC: 98 MMOL/L (ref 100–108)
CLARITY UR: CLEAR
CO2 SERPL-SCNC: 24 MMOL/L (ref 21–32)
CO2 SERPL-SCNC: 25 MMOL/L (ref 21–32)
COLOR UR: ABNORMAL
CREAT SERPL-MCNC: 1.57 MG/DL (ref 0.6–1.3)
CREAT SERPL-MCNC: 1.64 MG/DL (ref 0.6–1.3)
EOSINOPHIL # BLD AUTO: 0.22 THOUSAND/ΜL (ref 0–0.61)
EOSINOPHIL NFR BLD AUTO: 3 % (ref 0–6)
ERYTHROCYTE [DISTWIDTH] IN BLOOD BY AUTOMATED COUNT: 13.3 % (ref 11.6–15.1)
ETHANOL SERPL-MCNC: 215 MG/DL (ref 0–3)
GFR SERPL CREATININE-BSD FRML MDRD: 46 ML/MIN/1.73SQ M
GFR SERPL CREATININE-BSD FRML MDRD: 48 ML/MIN/1.73SQ M
GLUCOSE SERPL-MCNC: 102 MG/DL (ref 65–140)
GLUCOSE SERPL-MCNC: 105 MG/DL (ref 65–140)
GLUCOSE SERPL-MCNC: 109 MG/DL (ref 65–140)
GLUCOSE UR STRIP-MCNC: NEGATIVE MG/DL
HCT VFR BLD AUTO: 41.6 % (ref 36.5–49.3)
HGB BLD-MCNC: 13.7 G/DL (ref 12–17)
HGB UR QL STRIP.AUTO: NEGATIVE
IMM GRANULOCYTES # BLD AUTO: 0.05 THOUSAND/UL (ref 0–0.2)
IMM GRANULOCYTES NFR BLD AUTO: 1 % (ref 0–2)
KETONES UR STRIP-MCNC: ABNORMAL MG/DL
LACTATE SERPL-SCNC: 2 MMOL/L (ref 0.5–2)
LACTATE SERPL-SCNC: 2.5 MMOL/L (ref 0.5–2)
LEUKOCYTE ESTERASE UR QL STRIP: NEGATIVE
LYMPHOCYTES # BLD AUTO: 1.64 THOUSANDS/ΜL (ref 0.6–4.47)
LYMPHOCYTES NFR BLD AUTO: 20 % (ref 14–44)
MAGNESIUM SERPL-MCNC: 1.7 MG/DL (ref 1.6–2.6)
MCH RBC QN AUTO: 32.9 PG (ref 26.8–34.3)
MCHC RBC AUTO-ENTMCNC: 32.9 G/DL (ref 31.4–37.4)
MCV RBC AUTO: 100 FL (ref 82–98)
MONOCYTES # BLD AUTO: 0.89 THOUSAND/ΜL (ref 0.17–1.22)
MONOCYTES NFR BLD AUTO: 11 % (ref 4–12)
NEUTROPHILS # BLD AUTO: 5.33 THOUSANDS/ΜL (ref 1.85–7.62)
NEUTS SEG NFR BLD AUTO: 64 % (ref 43–75)
NITRITE UR QL STRIP: NEGATIVE
NRBC BLD AUTO-RTO: 0 /100 WBCS
PH UR STRIP.AUTO: 5.5 [PH]
PLATELET # BLD AUTO: 130 THOUSANDS/UL (ref 149–390)
PMV BLD AUTO: 10.9 FL (ref 8.9–12.7)
POTASSIUM SERPL-SCNC: 4 MMOL/L (ref 3.5–5.3)
POTASSIUM SERPL-SCNC: 4.1 MMOL/L (ref 3.5–5.3)
PROT SERPL-MCNC: 7.2 G/DL (ref 6.4–8.2)
PROT UR STRIP-MCNC: ABNORMAL MG/DL
RBC # BLD AUTO: 4.17 MILLION/UL (ref 3.88–5.62)
SODIUM SERPL-SCNC: 134 MMOL/L (ref 136–145)
SODIUM SERPL-SCNC: 136 MMOL/L (ref 136–145)
SP GR UR STRIP.AUTO: 1.02 (ref 1–1.03)
TROPONIN I SERPL-MCNC: <0.02 NG/ML
UROBILINOGEN UR QL STRIP.AUTO: 0.2 E.U./DL
WBC # BLD AUTO: 8.19 THOUSAND/UL (ref 4.31–10.16)

## 2019-06-01 PROCEDURE — 93005 ELECTROCARDIOGRAM TRACING: CPT

## 2019-06-01 PROCEDURE — 96361 HYDRATE IV INFUSION ADD-ON: CPT

## 2019-06-01 PROCEDURE — 81001 URINALYSIS AUTO W/SCOPE: CPT | Performed by: EMERGENCY MEDICINE

## 2019-06-01 PROCEDURE — 71045 X-RAY EXAM CHEST 1 VIEW: CPT

## 2019-06-01 PROCEDURE — 96360 HYDRATION IV INFUSION INIT: CPT

## 2019-06-01 PROCEDURE — 84484 ASSAY OF TROPONIN QUANT: CPT | Performed by: EMERGENCY MEDICINE

## 2019-06-01 PROCEDURE — 83605 ASSAY OF LACTIC ACID: CPT | Performed by: EMERGENCY MEDICINE

## 2019-06-01 PROCEDURE — 83735 ASSAY OF MAGNESIUM: CPT | Performed by: EMERGENCY MEDICINE

## 2019-06-01 PROCEDURE — 80053 COMPREHEN METABOLIC PANEL: CPT | Performed by: EMERGENCY MEDICINE

## 2019-06-01 PROCEDURE — 80048 BASIC METABOLIC PNL TOTAL CA: CPT | Performed by: EMERGENCY MEDICINE

## 2019-06-01 PROCEDURE — 36415 COLL VENOUS BLD VENIPUNCTURE: CPT | Performed by: EMERGENCY MEDICINE

## 2019-06-01 PROCEDURE — 85025 COMPLETE CBC W/AUTO DIFF WBC: CPT | Performed by: EMERGENCY MEDICINE

## 2019-06-01 PROCEDURE — 99284 EMERGENCY DEPT VISIT MOD MDM: CPT

## 2019-06-01 PROCEDURE — 80320 DRUG SCREEN QUANTALCOHOLS: CPT | Performed by: EMERGENCY MEDICINE

## 2019-06-01 PROCEDURE — 82948 REAGENT STRIP/BLOOD GLUCOSE: CPT

## 2019-06-01 RX ADMIN — SODIUM CHLORIDE 1000 ML: 0.9 INJECTION, SOLUTION INTRAVENOUS at 22:45

## 2019-06-01 RX ADMIN — SODIUM CHLORIDE 1000 ML: 0.9 INJECTION, SOLUTION INTRAVENOUS at 21:53

## 2019-06-02 VITALS
TEMPERATURE: 98.1 F | SYSTOLIC BLOOD PRESSURE: 105 MMHG | OXYGEN SATURATION: 100 % | RESPIRATION RATE: 20 BRPM | HEART RATE: 96 BPM | BODY MASS INDEX: 30.16 KG/M2 | HEIGHT: 74 IN | WEIGHT: 235 LBS | DIASTOLIC BLOOD PRESSURE: 62 MMHG

## 2019-06-02 LAB
BACTERIA UR QL AUTO: ABNORMAL /HPF
HYALINE CASTS #/AREA URNS LPF: ABNORMAL /LPF
MUCOUS THREADS UR QL AUTO: ABNORMAL
NON-SQ EPI CELLS URNS QL MICRO: ABNORMAL /HPF
RBC #/AREA URNS AUTO: ABNORMAL /HPF
WBC #/AREA URNS AUTO: ABNORMAL /HPF

## 2019-06-05 LAB
ATRIAL RATE: 72 BPM
P AXIS: 71 DEGREES
PR INTERVAL: 124 MS
QRS AXIS: 75 DEGREES
QRSD INTERVAL: 90 MS
QT INTERVAL: 434 MS
QTC INTERVAL: 475 MS
T WAVE AXIS: 50 DEGREES
VENTRICULAR RATE: 72 BPM

## 2019-06-05 PROCEDURE — 93010 ELECTROCARDIOGRAM REPORT: CPT | Performed by: INTERNAL MEDICINE

## 2019-06-07 ENCOUNTER — APPOINTMENT (EMERGENCY)
Dept: RADIOLOGY | Facility: HOSPITAL | Age: 59
DRG: 315 | End: 2019-06-07
Payer: MEDICARE

## 2019-06-07 ENCOUNTER — TRANSCRIBE ORDERS (OUTPATIENT)
Dept: ADMINISTRATIVE | Facility: HOSPITAL | Age: 59
End: 2019-06-07

## 2019-06-07 ENCOUNTER — APPOINTMENT (INPATIENT)
Dept: RADIOLOGY | Facility: HOSPITAL | Age: 59
DRG: 315 | End: 2019-06-07
Payer: MEDICARE

## 2019-06-07 ENCOUNTER — HOSPITAL ENCOUNTER (INPATIENT)
Facility: HOSPITAL | Age: 59
LOS: 4 days | Discharge: HOME | DRG: 315 | End: 2019-06-11
Attending: EMERGENCY MEDICINE | Admitting: FAMILY MEDICINE
Payer: MEDICARE

## 2019-06-07 DIAGNOSIS — I25.10 CAD (CORONARY ARTERY DISEASE): ICD-10-CM

## 2019-06-07 DIAGNOSIS — F10.929 ALCOHOL INTOXICATION (HCC): Primary | ICD-10-CM

## 2019-06-07 DIAGNOSIS — Z95.1 HX OF CABG: ICD-10-CM

## 2019-06-07 DIAGNOSIS — F10.239 ALCOHOL WITHDRAWAL SYNDROME WITH COMPLICATION (HCC): ICD-10-CM

## 2019-06-07 DIAGNOSIS — J44.9 CHRONIC OBSTRUCTIVE PULMONARY DISEASE, UNSPECIFIED COPD TYPE (HCC): ICD-10-CM

## 2019-06-07 DIAGNOSIS — R55 SYNCOPE: ICD-10-CM

## 2019-06-07 DIAGNOSIS — Z72.0 NICOTINE ABUSE: ICD-10-CM

## 2019-06-07 DIAGNOSIS — F10.230 ALCOHOL WITHDRAWAL SYNDROME WITHOUT COMPLICATION (HCC): ICD-10-CM

## 2019-06-07 DIAGNOSIS — R55 SYNCOPE AND COLLAPSE: ICD-10-CM

## 2019-06-07 DIAGNOSIS — E78.5 HLD (HYPERLIPIDEMIA): ICD-10-CM

## 2019-06-07 PROBLEM — N17.9 ACUTE KIDNEY INJURY SUPERIMPOSED ON CKD (HCC): Status: ACTIVE | Noted: 2019-06-07

## 2019-06-07 PROBLEM — E87.2 LACTIC ACIDOSIS: Status: ACTIVE | Noted: 2019-06-07

## 2019-06-07 PROBLEM — R74.8 ELEVATED LIPASE: Status: ACTIVE | Noted: 2019-06-07

## 2019-06-07 PROBLEM — E87.8 ELECTROLYTE IMBALANCE: Status: ACTIVE | Noted: 2019-06-07

## 2019-06-07 PROBLEM — N18.9 ACUTE KIDNEY INJURY SUPERIMPOSED ON CKD (HCC): Status: ACTIVE | Noted: 2019-06-07

## 2019-06-07 PROBLEM — E87.20 LACTIC ACIDOSIS: Status: ACTIVE | Noted: 2019-06-07

## 2019-06-07 PROBLEM — Z85.46 HISTORY OF PROSTATE CANCER: Status: ACTIVE | Noted: 2019-06-07

## 2019-06-07 PROBLEM — R79.89 ELEVATED BRAIN NATRIURETIC PEPTIDE (BNP) LEVEL: Status: ACTIVE | Noted: 2019-06-07

## 2019-06-07 LAB
ALBUMIN SERPL BCP-MCNC: 3.6 G/DL (ref 3.5–5)
ALP SERPL-CCNC: 68 U/L (ref 46–116)
ALT SERPL W P-5'-P-CCNC: 41 U/L (ref 12–78)
ANION GAP SERPL CALCULATED.3IONS-SCNC: 13 MMOL/L (ref 4–13)
AST SERPL W P-5'-P-CCNC: 33 U/L (ref 5–45)
BASOPHILS # BLD AUTO: 0.08 THOUSANDS/ΜL (ref 0–0.1)
BASOPHILS NFR BLD AUTO: 1 % (ref 0–1)
BILIRUB SERPL-MCNC: 0.3 MG/DL (ref 0.2–1)
BUN SERPL-MCNC: 25 MG/DL (ref 5–25)
CALCIUM SERPL-MCNC: 8 MG/DL (ref 8.3–10.1)
CHLORIDE SERPL-SCNC: 102 MMOL/L (ref 100–108)
CK MB SERPL-MCNC: 2.5 % (ref 0–2.5)
CK MB SERPL-MCNC: 4.1 NG/ML (ref 0–5)
CK SERPL-CCNC: 166 U/L (ref 39–308)
CO2 SERPL-SCNC: 25 MMOL/L (ref 21–32)
CREAT SERPL-MCNC: 2 MG/DL (ref 0.6–1.3)
DEPRECATED D DIMER PPP: 1290 NG/ML (FEU) (ref 190–520)
EOSINOPHIL # BLD AUTO: 0.12 THOUSAND/ΜL (ref 0–0.61)
EOSINOPHIL NFR BLD AUTO: 1 % (ref 0–6)
ERYTHROCYTE [DISTWIDTH] IN BLOOD BY AUTOMATED COUNT: 13.8 % (ref 11.6–15.1)
EST. AVERAGE GLUCOSE BLD GHB EST-MCNC: 108 MG/DL
ETHANOL SERPL-MCNC: 283 MG/DL (ref 0–3)
FOLATE SERPL-MCNC: 14.9 NG/ML (ref 3.1–17.5)
GFR SERPL CREATININE-BSD FRML MDRD: 36 ML/MIN/1.73SQ M
GLUCOSE SERPL-MCNC: 101 MG/DL (ref 65–140)
GLUCOSE SERPL-MCNC: 103 MG/DL (ref 65–140)
GLUCOSE SERPL-MCNC: 96 MG/DL (ref 65–140)
HBA1C MFR BLD: 5.4 % (ref 4.2–6.3)
HCT VFR BLD AUTO: 43.3 % (ref 36.5–49.3)
HGB BLD-MCNC: 14.5 G/DL (ref 12–17)
IMM GRANULOCYTES # BLD AUTO: 0.04 THOUSAND/UL (ref 0–0.2)
IMM GRANULOCYTES NFR BLD AUTO: 1 % (ref 0–2)
LACTATE SERPL-SCNC: 1.9 MMOL/L (ref 0.5–2)
LACTATE SERPL-SCNC: 2.2 MMOL/L (ref 0.5–2)
LIPASE SERPL-CCNC: 714 U/L (ref 73–393)
LYMPHOCYTES # BLD AUTO: 2.58 THOUSANDS/ΜL (ref 0.6–4.47)
LYMPHOCYTES NFR BLD AUTO: 31 % (ref 14–44)
MAGNESIUM SERPL-MCNC: 1.4 MG/DL (ref 1.6–2.6)
MCH RBC QN AUTO: 33 PG (ref 26.8–34.3)
MCHC RBC AUTO-ENTMCNC: 33.5 G/DL (ref 31.4–37.4)
MCV RBC AUTO: 98 FL (ref 82–98)
MONOCYTES # BLD AUTO: 1.36 THOUSAND/ΜL (ref 0.17–1.22)
MONOCYTES NFR BLD AUTO: 16 % (ref 4–12)
NEUTROPHILS # BLD AUTO: 4.24 THOUSANDS/ΜL (ref 1.85–7.62)
NEUTS SEG NFR BLD AUTO: 50 % (ref 43–75)
NRBC BLD AUTO-RTO: 0 /100 WBCS
NT-PROBNP SERPL-MCNC: 3813 PG/ML
PLATELET # BLD AUTO: 172 THOUSANDS/UL (ref 149–390)
PMV BLD AUTO: 10.8 FL (ref 8.9–12.7)
POTASSIUM SERPL-SCNC: 3.6 MMOL/L (ref 3.5–5.3)
PROT SERPL-MCNC: 7.6 G/DL (ref 6.4–8.2)
RBC # BLD AUTO: 4.4 MILLION/UL (ref 3.88–5.62)
SODIUM SERPL-SCNC: 140 MMOL/L (ref 136–145)
T4 FREE SERPL-MCNC: 1.11 NG/DL (ref 0.76–1.46)
TROPONIN I SERPL-MCNC: 0.03 NG/ML
TROPONIN I SERPL-MCNC: 0.03 NG/ML
TROPONIN I SERPL-MCNC: <0.02 NG/ML
TSH SERPL DL<=0.05 MIU/L-ACNC: 2.24 UIU/ML (ref 0.36–3.74)
VIT B12 SERPL-MCNC: 380 PG/ML (ref 100–900)
WBC # BLD AUTO: 8.42 THOUSAND/UL (ref 4.31–10.16)

## 2019-06-07 PROCEDURE — 82948 REAGENT STRIP/BLOOD GLUCOSE: CPT

## 2019-06-07 PROCEDURE — 96360 HYDRATION IV INFUSION INIT: CPT

## 2019-06-07 PROCEDURE — 80320 DRUG SCREEN QUANTALCOHOLS: CPT | Performed by: EMERGENCY MEDICINE

## 2019-06-07 PROCEDURE — 84484 ASSAY OF TROPONIN QUANT: CPT | Performed by: EMERGENCY MEDICINE

## 2019-06-07 PROCEDURE — 82553 CREATINE MB FRACTION: CPT | Performed by: NURSE PRACTITIONER

## 2019-06-07 PROCEDURE — 84484 ASSAY OF TROPONIN QUANT: CPT | Performed by: NURSE PRACTITIONER

## 2019-06-07 PROCEDURE — 83036 HEMOGLOBIN GLYCOSYLATED A1C: CPT | Performed by: NURSE PRACTITIONER

## 2019-06-07 PROCEDURE — 84443 ASSAY THYROID STIM HORMONE: CPT | Performed by: NURSE PRACTITIONER

## 2019-06-07 PROCEDURE — 94760 N-INVAS EAR/PLS OXIMETRY 1: CPT

## 2019-06-07 PROCEDURE — 93970 EXTREMITY STUDY: CPT | Performed by: SURGERY

## 2019-06-07 PROCEDURE — 83605 ASSAY OF LACTIC ACID: CPT | Performed by: EMERGENCY MEDICINE

## 2019-06-07 PROCEDURE — 82550 ASSAY OF CK (CPK): CPT | Performed by: NURSE PRACTITIONER

## 2019-06-07 PROCEDURE — 85025 COMPLETE CBC W/AUTO DIFF WBC: CPT | Performed by: EMERGENCY MEDICINE

## 2019-06-07 PROCEDURE — 36415 COLL VENOUS BLD VENIPUNCTURE: CPT | Performed by: EMERGENCY MEDICINE

## 2019-06-07 PROCEDURE — 93880 EXTRACRANIAL BILAT STUDY: CPT | Performed by: SURGERY

## 2019-06-07 PROCEDURE — 93005 ELECTROCARDIOGRAM TRACING: CPT

## 2019-06-07 PROCEDURE — 99223 1ST HOSP IP/OBS HIGH 75: CPT | Performed by: NURSE PRACTITIONER

## 2019-06-07 PROCEDURE — 96361 HYDRATE IV INFUSION ADD-ON: CPT

## 2019-06-07 PROCEDURE — 82607 VITAMIN B-12: CPT | Performed by: NURSE PRACTITIONER

## 2019-06-07 PROCEDURE — 83690 ASSAY OF LIPASE: CPT | Performed by: EMERGENCY MEDICINE

## 2019-06-07 PROCEDURE — 83880 ASSAY OF NATRIURETIC PEPTIDE: CPT | Performed by: EMERGENCY MEDICINE

## 2019-06-07 PROCEDURE — 93880 EXTRACRANIAL BILAT STUDY: CPT

## 2019-06-07 PROCEDURE — 82746 ASSAY OF FOLIC ACID SERUM: CPT | Performed by: NURSE PRACTITIONER

## 2019-06-07 PROCEDURE — 94664 DEMO&/EVAL PT USE INHALER: CPT

## 2019-06-07 PROCEDURE — 84439 ASSAY OF FREE THYROXINE: CPT | Performed by: NURSE PRACTITIONER

## 2019-06-07 PROCEDURE — 94762 N-INVAS EAR/PLS OXIMTRY CONT: CPT

## 2019-06-07 PROCEDURE — 71045 X-RAY EXAM CHEST 1 VIEW: CPT

## 2019-06-07 PROCEDURE — 83735 ASSAY OF MAGNESIUM: CPT | Performed by: EMERGENCY MEDICINE

## 2019-06-07 PROCEDURE — 70450 CT HEAD/BRAIN W/O DYE: CPT

## 2019-06-07 PROCEDURE — 96365 THER/PROPH/DIAG IV INF INIT: CPT

## 2019-06-07 PROCEDURE — 99291 CRITICAL CARE FIRST HOUR: CPT

## 2019-06-07 PROCEDURE — 80053 COMPREHEN METABOLIC PANEL: CPT | Performed by: EMERGENCY MEDICINE

## 2019-06-07 PROCEDURE — 99222 1ST HOSP IP/OBS MODERATE 55: CPT | Performed by: INTERNAL MEDICINE

## 2019-06-07 PROCEDURE — 85379 FIBRIN DEGRADATION QUANT: CPT | Performed by: NURSE PRACTITIONER

## 2019-06-07 PROCEDURE — 93970 EXTREMITY STUDY: CPT

## 2019-06-07 RX ORDER — MAGNESIUM HYDROXIDE/ALUMINUM HYDROXICE/SIMETHICONE 120; 1200; 1200 MG/30ML; MG/30ML; MG/30ML
30 SUSPENSION ORAL ONCE
Status: COMPLETED | OUTPATIENT
Start: 2019-06-07 | End: 2019-06-07

## 2019-06-07 RX ORDER — ALBUMIN, HUMAN INJ 5% 5 %
12.5 SOLUTION INTRAVENOUS ONCE
Status: COMPLETED | OUTPATIENT
Start: 2019-06-07 | End: 2019-06-07

## 2019-06-07 RX ORDER — THIAMINE MONONITRATE (VIT B1) 100 MG
100 TABLET ORAL DAILY
Status: DISCONTINUED | OUTPATIENT
Start: 2019-06-07 | End: 2019-06-11 | Stop reason: HOSPADM

## 2019-06-07 RX ORDER — MAGNESIUM SULFATE 1 G/100ML
1 INJECTION INTRAVENOUS ONCE
Status: COMPLETED | OUTPATIENT
Start: 2019-06-07 | End: 2019-06-07

## 2019-06-07 RX ORDER — FAMOTIDINE 20 MG/1
20 TABLET, FILM COATED ORAL 2 TIMES DAILY
Status: DISCONTINUED | OUTPATIENT
Start: 2019-06-07 | End: 2019-06-11 | Stop reason: HOSPADM

## 2019-06-07 RX ORDER — BENZONATATE 100 MG/1
100 CAPSULE ORAL 3 TIMES DAILY PRN
Status: DISCONTINUED | OUTPATIENT
Start: 2019-06-07 | End: 2019-06-11 | Stop reason: HOSPADM

## 2019-06-07 RX ORDER — NICOTINE 21 MG/24HR
1 PATCH, TRANSDERMAL 24 HOURS TRANSDERMAL DAILY
Status: DISCONTINUED | OUTPATIENT
Start: 2019-06-07 | End: 2019-06-11 | Stop reason: HOSPADM

## 2019-06-07 RX ORDER — LORAZEPAM 1 MG/1
2 TABLET ORAL ONCE AS NEEDED
Status: COMPLETED | OUTPATIENT
Start: 2019-06-07 | End: 2019-06-07

## 2019-06-07 RX ORDER — HEPARIN SODIUM 5000 [USP'U]/ML
5000 INJECTION, SOLUTION INTRAVENOUS; SUBCUTANEOUS EVERY 8 HOURS SCHEDULED
Status: DISCONTINUED | OUTPATIENT
Start: 2019-06-07 | End: 2019-06-11 | Stop reason: HOSPADM

## 2019-06-07 RX ORDER — SODIUM CHLORIDE, SODIUM LACTATE, POTASSIUM CHLORIDE, CALCIUM CHLORIDE 600; 310; 30; 20 MG/100ML; MG/100ML; MG/100ML; MG/100ML
50 INJECTION, SOLUTION INTRAVENOUS CONTINUOUS
Status: DISCONTINUED | OUTPATIENT
Start: 2019-06-07 | End: 2019-06-09

## 2019-06-07 RX ORDER — ATORVASTATIN CALCIUM 40 MG/1
40 TABLET, FILM COATED ORAL
Status: DISCONTINUED | OUTPATIENT
Start: 2019-06-07 | End: 2019-06-11 | Stop reason: HOSPADM

## 2019-06-07 RX ORDER — SODIUM CHLORIDE 9 MG/ML
100 INJECTION, SOLUTION INTRAVENOUS CONTINUOUS
Status: DISCONTINUED | OUTPATIENT
Start: 2019-06-07 | End: 2019-06-07

## 2019-06-07 RX ORDER — ONDANSETRON 2 MG/ML
4 INJECTION INTRAMUSCULAR; INTRAVENOUS EVERY 6 HOURS PRN
Status: DISCONTINUED | OUTPATIENT
Start: 2019-06-07 | End: 2019-06-11 | Stop reason: HOSPADM

## 2019-06-07 RX ORDER — FLUTICASONE FUROATE AND VILANTEROL 200; 25 UG/1; UG/1
1 POWDER RESPIRATORY (INHALATION)
Status: DISCONTINUED | OUTPATIENT
Start: 2019-06-07 | End: 2019-06-11 | Stop reason: HOSPADM

## 2019-06-07 RX ORDER — MAGNESIUM SULFATE HEPTAHYDRATE 40 MG/ML
2 INJECTION, SOLUTION INTRAVENOUS ONCE
Status: COMPLETED | OUTPATIENT
Start: 2019-06-07 | End: 2019-06-07

## 2019-06-07 RX ORDER — METOPROLOL SUCCINATE 50 MG/1
75 TABLET, EXTENDED RELEASE ORAL DAILY
COMMUNITY
End: 2019-07-22 | Stop reason: SDUPTHER

## 2019-06-07 RX ORDER — RANOLAZINE 500 MG/1
500 TABLET, EXTENDED RELEASE ORAL 2 TIMES DAILY
Status: DISCONTINUED | OUTPATIENT
Start: 2019-06-07 | End: 2019-06-11 | Stop reason: HOSPADM

## 2019-06-07 RX ORDER — ACETAMINOPHEN 325 MG/1
650 TABLET ORAL EVERY 6 HOURS PRN
Status: DISCONTINUED | OUTPATIENT
Start: 2019-06-07 | End: 2019-06-11 | Stop reason: HOSPADM

## 2019-06-07 RX ORDER — NALTREXONE HYDROCHLORIDE 50 MG/1
50 TABLET, FILM COATED ORAL DAILY
Status: DISCONTINUED | OUTPATIENT
Start: 2019-06-07 | End: 2019-06-11 | Stop reason: HOSPADM

## 2019-06-07 RX ORDER — ALBUTEROL SULFATE 90 UG/1
2 AEROSOL, METERED RESPIRATORY (INHALATION) EVERY 4 HOURS PRN
Status: DISCONTINUED | OUTPATIENT
Start: 2019-06-07 | End: 2019-06-11 | Stop reason: HOSPADM

## 2019-06-07 RX ORDER — FOLIC ACID 1 MG/1
1 TABLET ORAL DAILY
Status: DISCONTINUED | OUTPATIENT
Start: 2019-06-07 | End: 2019-06-11 | Stop reason: HOSPADM

## 2019-06-07 RX ORDER — TAMSULOSIN HYDROCHLORIDE 0.4 MG/1
0.4 CAPSULE ORAL
Status: DISCONTINUED | OUTPATIENT
Start: 2019-06-07 | End: 2019-06-11 | Stop reason: HOSPADM

## 2019-06-07 RX ORDER — SERTRALINE HYDROCHLORIDE 100 MG/1
100 TABLET, FILM COATED ORAL DAILY
Status: DISCONTINUED | OUTPATIENT
Start: 2019-06-07 | End: 2019-06-11 | Stop reason: HOSPADM

## 2019-06-07 RX ORDER — CLOPIDOGREL BISULFATE 75 MG/1
75 TABLET ORAL DAILY
Status: DISCONTINUED | OUTPATIENT
Start: 2019-06-07 | End: 2019-06-11 | Stop reason: HOSPADM

## 2019-06-07 RX ADMIN — SODIUM CHLORIDE, SODIUM LACTATE, POTASSIUM CHLORIDE, AND CALCIUM CHLORIDE 50 ML/HR: .6; .31; .03; .02 INJECTION, SOLUTION INTRAVENOUS at 11:30

## 2019-06-07 RX ADMIN — B-COMPLEX W/ C & FOLIC ACID TAB 1 TABLET: TAB at 11:42

## 2019-06-07 RX ADMIN — MAGNESIUM SULFATE HEPTAHYDRATE 1 G: 1 INJECTION, SOLUTION INTRAVENOUS at 11:39

## 2019-06-07 RX ADMIN — SODIUM CHLORIDE 2000 ML: 0.9 INJECTION, SOLUTION INTRAVENOUS at 04:06

## 2019-06-07 RX ADMIN — Medication 100 MG: at 11:42

## 2019-06-07 RX ADMIN — FAMOTIDINE 20 MG: 20 TABLET ORAL at 11:42

## 2019-06-07 RX ADMIN — ALUMINUM HYDROXIDE, MAGNESIUM HYDROXIDE, AND SIMETHICONE 30 ML: 200; 200; 20 SUSPENSION ORAL at 07:04

## 2019-06-07 RX ADMIN — NICOTINE 1 PATCH: 21 PATCH, EXTENDED RELEASE TRANSDERMAL at 11:42

## 2019-06-07 RX ADMIN — SODIUM CHLORIDE 1000 ML: 0.9 INJECTION, SOLUTION INTRAVENOUS at 05:41

## 2019-06-07 RX ADMIN — CLOPIDOGREL BISULFATE 75 MG: 75 TABLET ORAL at 11:42

## 2019-06-07 RX ADMIN — HEPARIN SODIUM 5000 UNITS: 5000 INJECTION INTRAVENOUS; SUBCUTANEOUS at 16:12

## 2019-06-07 RX ADMIN — TAMSULOSIN HYDROCHLORIDE 0.4 MG: 0.4 CAPSULE ORAL at 18:08

## 2019-06-07 RX ADMIN — FAMOTIDINE 20 MG: 20 TABLET ORAL at 18:08

## 2019-06-07 RX ADMIN — ALBUMIN (HUMAN) 12.5 G: 12.5 SOLUTION INTRAVENOUS at 08:39

## 2019-06-07 RX ADMIN — HEPARIN SODIUM 5000 UNITS: 5000 INJECTION INTRAVENOUS; SUBCUTANEOUS at 22:35

## 2019-06-07 RX ADMIN — NALTREXONE HYDROCHLORIDE 50 MG: 50 TABLET, FILM COATED ORAL at 12:00

## 2019-06-07 RX ADMIN — ATORVASTATIN CALCIUM 40 MG: 40 TABLET, FILM COATED ORAL at 18:07

## 2019-06-07 RX ADMIN — TIOTROPIUM BROMIDE 18 MCG: 18 CAPSULE ORAL; RESPIRATORY (INHALATION) at 12:47

## 2019-06-07 RX ADMIN — LORAZEPAM 2 MG: 1 TABLET ORAL at 20:23

## 2019-06-07 RX ADMIN — SODIUM CHLORIDE 100 ML/HR: 0.9 INJECTION, SOLUTION INTRAVENOUS at 07:04

## 2019-06-07 RX ADMIN — FLUTICASONE FUROATE AND VILANTEROL TRIFENATATE 1 PUFF: 200; 25 POWDER RESPIRATORY (INHALATION) at 12:46

## 2019-06-07 RX ADMIN — RANOLAZINE 500 MG: 500 TABLET, FILM COATED, EXTENDED RELEASE ORAL at 11:42

## 2019-06-07 RX ADMIN — MAGNESIUM SULFATE HEPTAHYDRATE 2 G: 40 INJECTION, SOLUTION INTRAVENOUS at 04:44

## 2019-06-07 RX ADMIN — SODIUM CHLORIDE 1000 ML: 0.9 INJECTION, SOLUTION INTRAVENOUS at 04:48

## 2019-06-07 RX ADMIN — RANOLAZINE 500 MG: 500 TABLET, FILM COATED, EXTENDED RELEASE ORAL at 18:08

## 2019-06-07 RX ADMIN — FOLIC ACID 1 MG: 1 TABLET ORAL at 11:42

## 2019-06-07 RX ADMIN — SERTRALINE HYDROCHLORIDE 100 MG: 100 TABLET ORAL at 11:42

## 2019-06-08 ENCOUNTER — APPOINTMENT (INPATIENT)
Dept: NON INVASIVE DIAGNOSTICS | Facility: HOSPITAL | Age: 59
DRG: 315 | End: 2019-06-08
Payer: MEDICARE

## 2019-06-08 PROBLEM — E87.2 LACTIC ACIDOSIS: Status: RESOLVED | Noted: 2019-06-07 | Resolved: 2019-06-08

## 2019-06-08 PROBLEM — E87.20 LACTIC ACIDOSIS: Status: RESOLVED | Noted: 2019-06-07 | Resolved: 2019-06-08

## 2019-06-08 LAB
ANION GAP SERPL CALCULATED.3IONS-SCNC: 11 MMOL/L (ref 4–13)
BUN SERPL-MCNC: 17 MG/DL (ref 5–25)
CALCIUM SERPL-MCNC: 8.1 MG/DL (ref 8.3–10.1)
CHLORIDE SERPL-SCNC: 104 MMOL/L (ref 100–108)
CO2 SERPL-SCNC: 25 MMOL/L (ref 21–32)
CREAT SERPL-MCNC: 0.78 MG/DL (ref 0.6–1.3)
ERYTHROCYTE [DISTWIDTH] IN BLOOD BY AUTOMATED COUNT: 13.5 % (ref 11.6–15.1)
GFR SERPL CREATININE-BSD FRML MDRD: 100 ML/MIN/1.73SQ M
GLUCOSE SERPL-MCNC: 106 MG/DL (ref 65–140)
GLUCOSE SERPL-MCNC: 111 MG/DL (ref 65–140)
GLUCOSE SERPL-MCNC: 167 MG/DL (ref 65–140)
GLUCOSE SERPL-MCNC: 79 MG/DL (ref 65–140)
GLUCOSE SERPL-MCNC: 94 MG/DL (ref 65–140)
HCT VFR BLD AUTO: 40 % (ref 36.5–49.3)
HGB BLD-MCNC: 13.4 G/DL (ref 12–17)
LIPASE SERPL-CCNC: 249 U/L (ref 73–393)
MAGNESIUM SERPL-MCNC: 1.7 MG/DL (ref 1.6–2.6)
MCH RBC QN AUTO: 33.4 PG (ref 26.8–34.3)
MCHC RBC AUTO-ENTMCNC: 33.5 G/DL (ref 31.4–37.4)
MCV RBC AUTO: 100 FL (ref 82–98)
PHOSPHATE SERPL-MCNC: 2.7 MG/DL (ref 2.7–4.5)
PLATELET # BLD AUTO: 114 THOUSANDS/UL (ref 149–390)
PMV BLD AUTO: 11.1 FL (ref 8.9–12.7)
POTASSIUM SERPL-SCNC: 3.8 MMOL/L (ref 3.5–5.3)
RBC # BLD AUTO: 4.01 MILLION/UL (ref 3.88–5.62)
SODIUM SERPL-SCNC: 140 MMOL/L (ref 136–145)
WBC # BLD AUTO: 7.05 THOUSAND/UL (ref 4.31–10.16)

## 2019-06-08 PROCEDURE — 85027 COMPLETE CBC AUTOMATED: CPT | Performed by: NURSE PRACTITIONER

## 2019-06-08 PROCEDURE — 83690 ASSAY OF LIPASE: CPT | Performed by: NURSE PRACTITIONER

## 2019-06-08 PROCEDURE — 84100 ASSAY OF PHOSPHORUS: CPT | Performed by: INTERNAL MEDICINE

## 2019-06-08 PROCEDURE — 80048 BASIC METABOLIC PNL TOTAL CA: CPT | Performed by: NURSE PRACTITIONER

## 2019-06-08 PROCEDURE — 93005 ELECTROCARDIOGRAM TRACING: CPT

## 2019-06-08 PROCEDURE — 94762 N-INVAS EAR/PLS OXIMTRY CONT: CPT

## 2019-06-08 PROCEDURE — 99232 SBSQ HOSP IP/OBS MODERATE 35: CPT | Performed by: NURSE PRACTITIONER

## 2019-06-08 PROCEDURE — 94760 N-INVAS EAR/PLS OXIMETRY 1: CPT

## 2019-06-08 PROCEDURE — 82948 REAGENT STRIP/BLOOD GLUCOSE: CPT

## 2019-06-08 PROCEDURE — 93306 TTE W/DOPPLER COMPLETE: CPT

## 2019-06-08 PROCEDURE — 99232 SBSQ HOSP IP/OBS MODERATE 35: CPT | Performed by: INTERNAL MEDICINE

## 2019-06-08 PROCEDURE — 83735 ASSAY OF MAGNESIUM: CPT | Performed by: NURSE PRACTITIONER

## 2019-06-08 RX ORDER — CHLORDIAZEPOXIDE HYDROCHLORIDE 25 MG/1
25 CAPSULE, GELATIN COATED ORAL EVERY 6 HOURS SCHEDULED
Status: DISCONTINUED | OUTPATIENT
Start: 2019-06-12 | End: 2019-06-10

## 2019-06-08 RX ORDER — CHLORDIAZEPOXIDE HYDROCHLORIDE 25 MG/1
25 CAPSULE, GELATIN COATED ORAL EVERY 4 HOURS
Status: DISCONTINUED | OUTPATIENT
Start: 2019-06-10 | End: 2019-06-10

## 2019-06-08 RX ORDER — HYDRALAZINE HYDROCHLORIDE 10 MG/1
10 TABLET, FILM COATED ORAL EVERY 6 HOURS PRN
Status: DISCONTINUED | OUTPATIENT
Start: 2019-06-08 | End: 2019-06-11 | Stop reason: HOSPADM

## 2019-06-08 RX ORDER — CHLORDIAZEPOXIDE HYDROCHLORIDE 25 MG/1
50 CAPSULE, GELATIN COATED ORAL EVERY 6 HOURS SCHEDULED
Status: DISCONTINUED | OUTPATIENT
Start: 2019-06-09 | End: 2019-06-10

## 2019-06-08 RX ORDER — AMLODIPINE BESYLATE 5 MG/1
5 TABLET ORAL DAILY
Status: DISCONTINUED | OUTPATIENT
Start: 2019-06-08 | End: 2019-06-11 | Stop reason: HOSPADM

## 2019-06-08 RX ORDER — CHLORDIAZEPOXIDE HYDROCHLORIDE 25 MG/1
50 CAPSULE, GELATIN COATED ORAL EVERY 4 HOURS
Status: DISPENSED | OUTPATIENT
Start: 2019-06-08 | End: 2019-06-09

## 2019-06-08 RX ORDER — MAGNESIUM SULFATE 1 G/100ML
1 INJECTION INTRAVENOUS ONCE
Status: COMPLETED | OUTPATIENT
Start: 2019-06-08 | End: 2019-06-08

## 2019-06-08 RX ADMIN — MAGNESIUM OXIDE TAB 400 MG (241.3 MG ELEMENTAL MG) 400 MG: 400 (241.3 MG) TAB at 17:12

## 2019-06-08 RX ADMIN — HEPARIN SODIUM 5000 UNITS: 5000 INJECTION INTRAVENOUS; SUBCUTANEOUS at 21:19

## 2019-06-08 RX ADMIN — FLUTICASONE FUROATE AND VILANTEROL TRIFENATATE 1 PUFF: 200; 25 POWDER RESPIRATORY (INHALATION) at 08:20

## 2019-06-08 RX ADMIN — HEPARIN SODIUM 5000 UNITS: 5000 INJECTION INTRAVENOUS; SUBCUTANEOUS at 14:38

## 2019-06-08 RX ADMIN — AMLODIPINE BESYLATE 5 MG: 5 TABLET ORAL at 12:22

## 2019-06-08 RX ADMIN — B-COMPLEX W/ C & FOLIC ACID TAB 1 TABLET: TAB at 08:19

## 2019-06-08 RX ADMIN — ACETAMINOPHEN 650 MG: 325 TABLET, FILM COATED ORAL at 11:08

## 2019-06-08 RX ADMIN — SODIUM CHLORIDE, SODIUM LACTATE, POTASSIUM CHLORIDE, AND CALCIUM CHLORIDE 75 ML/HR: .6; .31; .03; .02 INJECTION, SOLUTION INTRAVENOUS at 14:39

## 2019-06-08 RX ADMIN — BENZONATATE 100 MG: 100 CAPSULE ORAL at 11:08

## 2019-06-08 RX ADMIN — ATORVASTATIN CALCIUM 40 MG: 40 TABLET, FILM COATED ORAL at 16:32

## 2019-06-08 RX ADMIN — SERTRALINE HYDROCHLORIDE 100 MG: 100 TABLET ORAL at 08:19

## 2019-06-08 RX ADMIN — FAMOTIDINE 20 MG: 20 TABLET ORAL at 17:12

## 2019-06-08 RX ADMIN — FAMOTIDINE 20 MG: 20 TABLET ORAL at 08:18

## 2019-06-08 RX ADMIN — NICOTINE 1 PATCH: 21 PATCH, EXTENDED RELEASE TRANSDERMAL at 08:21

## 2019-06-08 RX ADMIN — MAGNESIUM OXIDE TAB 400 MG (241.3 MG ELEMENTAL MG) 400 MG: 400 (241.3 MG) TAB at 12:22

## 2019-06-08 RX ADMIN — INSULIN LISPRO 1 UNITS: 100 INJECTION, SOLUTION INTRAVENOUS; SUBCUTANEOUS at 21:20

## 2019-06-08 RX ADMIN — CLOPIDOGREL BISULFATE 75 MG: 75 TABLET ORAL at 08:19

## 2019-06-08 RX ADMIN — ACETAMINOPHEN 650 MG: 325 TABLET, FILM COATED ORAL at 20:20

## 2019-06-08 RX ADMIN — RANOLAZINE 500 MG: 500 TABLET, FILM COATED, EXTENDED RELEASE ORAL at 08:19

## 2019-06-08 RX ADMIN — METOPROLOL SUCCINATE 75 MG: 50 TABLET, EXTENDED RELEASE ORAL at 08:18

## 2019-06-08 RX ADMIN — TIOTROPIUM BROMIDE 18 MCG: 18 CAPSULE ORAL; RESPIRATORY (INHALATION) at 08:20

## 2019-06-08 RX ADMIN — SODIUM CHLORIDE, SODIUM LACTATE, POTASSIUM CHLORIDE, AND CALCIUM CHLORIDE 75 ML/HR: .6; .31; .03; .02 INJECTION, SOLUTION INTRAVENOUS at 00:02

## 2019-06-08 RX ADMIN — CHLORDIAZEPOXIDE HYDROCHLORIDE 50 MG: 25 CAPSULE ORAL at 17:49

## 2019-06-08 RX ADMIN — HEPARIN SODIUM 5000 UNITS: 5000 INJECTION INTRAVENOUS; SUBCUTANEOUS at 05:22

## 2019-06-08 RX ADMIN — TAMSULOSIN HYDROCHLORIDE 0.4 MG: 0.4 CAPSULE ORAL at 16:32

## 2019-06-08 RX ADMIN — RANOLAZINE 500 MG: 500 TABLET, FILM COATED, EXTENDED RELEASE ORAL at 17:12

## 2019-06-08 RX ADMIN — CHLORDIAZEPOXIDE HYDROCHLORIDE 50 MG: 25 CAPSULE ORAL at 21:20

## 2019-06-08 RX ADMIN — NALTREXONE HYDROCHLORIDE 50 MG: 50 TABLET, FILM COATED ORAL at 08:21

## 2019-06-08 RX ADMIN — FOLIC ACID 1 MG: 1 TABLET ORAL at 08:19

## 2019-06-08 RX ADMIN — MAGNESIUM SULFATE HEPTAHYDRATE 1 G: 1 INJECTION, SOLUTION INTRAVENOUS at 17:45

## 2019-06-08 RX ADMIN — Medication 100 MG: at 08:19

## 2019-06-09 PROBLEM — D69.6 THROMBOCYTOPENIA (HCC): Status: ACTIVE | Noted: 2019-06-09

## 2019-06-09 LAB
ANION GAP SERPL CALCULATED.3IONS-SCNC: 7 MMOL/L (ref 4–13)
ATRIAL RATE: 66 BPM
ATRIAL RATE: 66 BPM
ATRIAL RATE: 67 BPM
BUN SERPL-MCNC: 14 MG/DL (ref 5–25)
CALCIUM SERPL-MCNC: 8.6 MG/DL (ref 8.3–10.1)
CHLORIDE SERPL-SCNC: 103 MMOL/L (ref 100–108)
CO2 SERPL-SCNC: 30 MMOL/L (ref 21–32)
CREAT SERPL-MCNC: 0.88 MG/DL (ref 0.6–1.3)
ERYTHROCYTE [DISTWIDTH] IN BLOOD BY AUTOMATED COUNT: 13.1 % (ref 11.6–15.1)
GFR SERPL CREATININE-BSD FRML MDRD: 95 ML/MIN/1.73SQ M
GLUCOSE SERPL-MCNC: 103 MG/DL (ref 65–140)
GLUCOSE SERPL-MCNC: 120 MG/DL (ref 65–140)
GLUCOSE SERPL-MCNC: 124 MG/DL (ref 65–140)
GLUCOSE SERPL-MCNC: 130 MG/DL (ref 65–140)
GLUCOSE SERPL-MCNC: 91 MG/DL (ref 65–140)
HCT VFR BLD AUTO: 40 % (ref 36.5–49.3)
HGB BLD-MCNC: 13.3 G/DL (ref 12–17)
MAGNESIUM SERPL-MCNC: 1.6 MG/DL (ref 1.6–2.6)
MCH RBC QN AUTO: 32.8 PG (ref 26.8–34.3)
MCHC RBC AUTO-ENTMCNC: 33.3 G/DL (ref 31.4–37.4)
MCV RBC AUTO: 99 FL (ref 82–98)
P AXIS: 26 DEGREES
P AXIS: 27 DEGREES
P AXIS: 71 DEGREES
PLATELET # BLD AUTO: 119 THOUSANDS/UL (ref 149–390)
PMV BLD AUTO: 11 FL (ref 8.9–12.7)
POTASSIUM SERPL-SCNC: 3.6 MMOL/L (ref 3.5–5.3)
PR INTERVAL: 120 MS
PR INTERVAL: 122 MS
PR INTERVAL: 126 MS
QRS AXIS: 26 DEGREES
QRS AXIS: 26 DEGREES
QRS AXIS: 87 DEGREES
QRSD INTERVAL: 90 MS
QRSD INTERVAL: 94 MS
QRSD INTERVAL: 96 MS
QT INTERVAL: 426 MS
QT INTERVAL: 440 MS
QT INTERVAL: 468 MS
QTC INTERVAL: 446 MS
QTC INTERVAL: 461 MS
QTC INTERVAL: 494 MS
RBC # BLD AUTO: 4.06 MILLION/UL (ref 3.88–5.62)
SODIUM SERPL-SCNC: 140 MMOL/L (ref 136–145)
T WAVE AXIS: 21 DEGREES
T WAVE AXIS: 22 DEGREES
T WAVE AXIS: 72 DEGREES
VENTRICULAR RATE: 66 BPM
VENTRICULAR RATE: 66 BPM
VENTRICULAR RATE: 67 BPM
WBC # BLD AUTO: 5.81 THOUSAND/UL (ref 4.31–10.16)

## 2019-06-09 PROCEDURE — 85027 COMPLETE CBC AUTOMATED: CPT | Performed by: NURSE PRACTITIONER

## 2019-06-09 PROCEDURE — 94760 N-INVAS EAR/PLS OXIMETRY 1: CPT

## 2019-06-09 PROCEDURE — 94762 N-INVAS EAR/PLS OXIMTRY CONT: CPT

## 2019-06-09 PROCEDURE — 82948 REAGENT STRIP/BLOOD GLUCOSE: CPT

## 2019-06-09 PROCEDURE — 83735 ASSAY OF MAGNESIUM: CPT | Performed by: NURSE PRACTITIONER

## 2019-06-09 PROCEDURE — 93010 ELECTROCARDIOGRAM REPORT: CPT | Performed by: INTERNAL MEDICINE

## 2019-06-09 PROCEDURE — 93306 TTE W/DOPPLER COMPLETE: CPT | Performed by: INTERNAL MEDICINE

## 2019-06-09 PROCEDURE — 99232 SBSQ HOSP IP/OBS MODERATE 35: CPT | Performed by: NURSE PRACTITIONER

## 2019-06-09 PROCEDURE — 80048 BASIC METABOLIC PNL TOTAL CA: CPT | Performed by: NURSE PRACTITIONER

## 2019-06-09 RX ORDER — MAGNESIUM SULFATE HEPTAHYDRATE 40 MG/ML
2 INJECTION, SOLUTION INTRAVENOUS ONCE
Status: COMPLETED | OUTPATIENT
Start: 2019-06-09 | End: 2019-06-09

## 2019-06-09 RX ADMIN — Medication 100 MG: at 09:36

## 2019-06-09 RX ADMIN — HEPARIN SODIUM 5000 UNITS: 5000 INJECTION INTRAVENOUS; SUBCUTANEOUS at 21:22

## 2019-06-09 RX ADMIN — CHLORDIAZEPOXIDE HYDROCHLORIDE 50 MG: 25 CAPSULE ORAL at 09:52

## 2019-06-09 RX ADMIN — HEPARIN SODIUM 5000 UNITS: 5000 INJECTION INTRAVENOUS; SUBCUTANEOUS at 14:04

## 2019-06-09 RX ADMIN — RANOLAZINE 500 MG: 500 TABLET, FILM COATED, EXTENDED RELEASE ORAL at 17:04

## 2019-06-09 RX ADMIN — CHLORDIAZEPOXIDE HYDROCHLORIDE 50 MG: 25 CAPSULE ORAL at 01:49

## 2019-06-09 RX ADMIN — CHLORDIAZEPOXIDE HYDROCHLORIDE 50 MG: 25 CAPSULE ORAL at 21:22

## 2019-06-09 RX ADMIN — ATORVASTATIN CALCIUM 40 MG: 40 TABLET, FILM COATED ORAL at 17:04

## 2019-06-09 RX ADMIN — MAGNESIUM OXIDE TAB 400 MG (241.3 MG ELEMENTAL MG) 400 MG: 400 (241.3 MG) TAB at 09:36

## 2019-06-09 RX ADMIN — METOPROLOL SUCCINATE 75 MG: 50 TABLET, EXTENDED RELEASE ORAL at 09:35

## 2019-06-09 RX ADMIN — CLOPIDOGREL BISULFATE 75 MG: 75 TABLET ORAL at 09:35

## 2019-06-09 RX ADMIN — NICOTINE 1 PATCH: 21 PATCH, EXTENDED RELEASE TRANSDERMAL at 08:09

## 2019-06-09 RX ADMIN — FLUTICASONE FUROATE AND VILANTEROL TRIFENATATE 1 PUFF: 200; 25 POWDER RESPIRATORY (INHALATION) at 08:09

## 2019-06-09 RX ADMIN — AMLODIPINE BESYLATE 5 MG: 5 TABLET ORAL at 09:36

## 2019-06-09 RX ADMIN — FAMOTIDINE 20 MG: 20 TABLET ORAL at 17:04

## 2019-06-09 RX ADMIN — SODIUM CHLORIDE, SODIUM LACTATE, POTASSIUM CHLORIDE, AND CALCIUM CHLORIDE 50 ML/HR: .6; .31; .03; .02 INJECTION, SOLUTION INTRAVENOUS at 09:50

## 2019-06-09 RX ADMIN — RANOLAZINE 500 MG: 500 TABLET, FILM COATED, EXTENDED RELEASE ORAL at 09:36

## 2019-06-09 RX ADMIN — B-COMPLEX W/ C & FOLIC ACID TAB 1 TABLET: TAB at 09:35

## 2019-06-09 RX ADMIN — HEPARIN SODIUM 5000 UNITS: 5000 INJECTION INTRAVENOUS; SUBCUTANEOUS at 05:30

## 2019-06-09 RX ADMIN — MAGNESIUM OXIDE TAB 400 MG (241.3 MG ELEMENTAL MG) 400 MG: 400 (241.3 MG) TAB at 17:04

## 2019-06-09 RX ADMIN — NALTREXONE HYDROCHLORIDE 50 MG: 50 TABLET, FILM COATED ORAL at 09:36

## 2019-06-09 RX ADMIN — CHLORDIAZEPOXIDE HYDROCHLORIDE 50 MG: 25 CAPSULE ORAL at 14:03

## 2019-06-09 RX ADMIN — TAMSULOSIN HYDROCHLORIDE 0.4 MG: 0.4 CAPSULE ORAL at 17:04

## 2019-06-09 RX ADMIN — SERTRALINE HYDROCHLORIDE 100 MG: 100 TABLET ORAL at 09:36

## 2019-06-09 RX ADMIN — FAMOTIDINE 20 MG: 20 TABLET ORAL at 09:36

## 2019-06-09 RX ADMIN — MAGNESIUM SULFATE HEPTAHYDRATE 2 G: 40 INJECTION, SOLUTION INTRAVENOUS at 11:19

## 2019-06-09 RX ADMIN — FOLIC ACID 1 MG: 1 TABLET ORAL at 09:36

## 2019-06-09 RX ADMIN — TIOTROPIUM BROMIDE 18 MCG: 18 CAPSULE ORAL; RESPIRATORY (INHALATION) at 08:10

## 2019-06-10 PROBLEM — F10.939 ALCOHOL WITHDRAWAL (HCC): Status: ACTIVE | Noted: 2019-06-07

## 2019-06-10 PROBLEM — F10.239 ALCOHOL WITHDRAWAL (HCC): Status: ACTIVE | Noted: 2019-06-07

## 2019-06-10 PROBLEM — R74.8 ELEVATED LIPASE: Status: RESOLVED | Noted: 2019-06-07 | Resolved: 2019-06-10

## 2019-06-10 LAB
ALBUMIN SERPL BCP-MCNC: 3.2 G/DL (ref 3.5–5)
ALP SERPL-CCNC: 61 U/L (ref 46–116)
ALT SERPL W P-5'-P-CCNC: 27 U/L (ref 12–78)
ANION GAP SERPL CALCULATED.3IONS-SCNC: 7 MMOL/L (ref 4–13)
AST SERPL W P-5'-P-CCNC: 15 U/L (ref 5–45)
BILIRUB SERPL-MCNC: 0.5 MG/DL (ref 0.2–1)
BUN SERPL-MCNC: 22 MG/DL (ref 5–25)
CALCIUM SERPL-MCNC: 8.6 MG/DL (ref 8.3–10.1)
CHLORIDE SERPL-SCNC: 103 MMOL/L (ref 100–108)
CO2 SERPL-SCNC: 28 MMOL/L (ref 21–32)
CREAT SERPL-MCNC: 1.05 MG/DL (ref 0.6–1.3)
GFR SERPL CREATININE-BSD FRML MDRD: 78 ML/MIN/1.73SQ M
GLUCOSE SERPL-MCNC: 103 MG/DL (ref 65–140)
GLUCOSE SERPL-MCNC: 125 MG/DL (ref 65–140)
GLUCOSE SERPL-MCNC: 149 MG/DL (ref 65–140)
GLUCOSE SERPL-MCNC: 166 MG/DL (ref 65–140)
GLUCOSE SERPL-MCNC: 188 MG/DL (ref 65–140)
MAGNESIUM SERPL-MCNC: 1.7 MG/DL (ref 1.6–2.6)
PLATELET # BLD AUTO: 117 THOUSANDS/UL (ref 149–390)
PMV BLD AUTO: 10.9 FL (ref 8.9–12.7)
POTASSIUM SERPL-SCNC: 4 MMOL/L (ref 3.5–5.3)
PROT SERPL-MCNC: 6.8 G/DL (ref 6.4–8.2)
SODIUM SERPL-SCNC: 138 MMOL/L (ref 136–145)

## 2019-06-10 PROCEDURE — 94760 N-INVAS EAR/PLS OXIMETRY 1: CPT

## 2019-06-10 PROCEDURE — 83735 ASSAY OF MAGNESIUM: CPT | Performed by: NURSE PRACTITIONER

## 2019-06-10 PROCEDURE — 82948 REAGENT STRIP/BLOOD GLUCOSE: CPT

## 2019-06-10 PROCEDURE — 99232 SBSQ HOSP IP/OBS MODERATE 35: CPT | Performed by: NURSE PRACTITIONER

## 2019-06-10 PROCEDURE — 80053 COMPREHEN METABOLIC PANEL: CPT | Performed by: NURSE PRACTITIONER

## 2019-06-10 PROCEDURE — 85049 AUTOMATED PLATELET COUNT: CPT | Performed by: NURSE PRACTITIONER

## 2019-06-10 RX ORDER — CHLORDIAZEPOXIDE HYDROCHLORIDE 25 MG/1
25 CAPSULE, GELATIN COATED ORAL EVERY 8 HOURS SCHEDULED
Status: DISCONTINUED | OUTPATIENT
Start: 2019-06-10 | End: 2019-06-11

## 2019-06-10 RX ADMIN — HEPARIN SODIUM 5000 UNITS: 5000 INJECTION INTRAVENOUS; SUBCUTANEOUS at 05:12

## 2019-06-10 RX ADMIN — FOLIC ACID 1 MG: 1 TABLET ORAL at 08:50

## 2019-06-10 RX ADMIN — CHLORDIAZEPOXIDE HYDROCHLORIDE 25 MG: 25 CAPSULE ORAL at 14:34

## 2019-06-10 RX ADMIN — B-COMPLEX W/ C & FOLIC ACID TAB 1 TABLET: TAB at 08:50

## 2019-06-10 RX ADMIN — NALTREXONE HYDROCHLORIDE 50 MG: 50 TABLET, FILM COATED ORAL at 08:51

## 2019-06-10 RX ADMIN — MAGNESIUM OXIDE TAB 400 MG (241.3 MG ELEMENTAL MG) 400 MG: 400 (241.3 MG) TAB at 08:50

## 2019-06-10 RX ADMIN — CLOPIDOGREL BISULFATE 75 MG: 75 TABLET ORAL at 08:50

## 2019-06-10 RX ADMIN — Medication 100 MG: at 08:50

## 2019-06-10 RX ADMIN — NICOTINE 1 PATCH: 21 PATCH, EXTENDED RELEASE TRANSDERMAL at 08:54

## 2019-06-10 RX ADMIN — SERTRALINE HYDROCHLORIDE 100 MG: 100 TABLET ORAL at 08:50

## 2019-06-10 RX ADMIN — TIOTROPIUM BROMIDE 18 MCG: 18 CAPSULE ORAL; RESPIRATORY (INHALATION) at 08:51

## 2019-06-10 RX ADMIN — FLUTICASONE FUROATE AND VILANTEROL TRIFENATATE 1 PUFF: 200; 25 POWDER RESPIRATORY (INHALATION) at 08:51

## 2019-06-10 RX ADMIN — AMLODIPINE BESYLATE 5 MG: 5 TABLET ORAL at 08:50

## 2019-06-10 RX ADMIN — MAGNESIUM OXIDE TAB 400 MG (241.3 MG ELEMENTAL MG) 400 MG: 400 (241.3 MG) TAB at 17:32

## 2019-06-10 RX ADMIN — INSULIN LISPRO 1 UNITS: 100 INJECTION, SOLUTION INTRAVENOUS; SUBCUTANEOUS at 16:35

## 2019-06-10 RX ADMIN — ALBUTEROL SULFATE 2 PUFF: 90 AEROSOL, METERED RESPIRATORY (INHALATION) at 14:35

## 2019-06-10 RX ADMIN — ATORVASTATIN CALCIUM 40 MG: 40 TABLET, FILM COATED ORAL at 16:35

## 2019-06-10 RX ADMIN — FAMOTIDINE 20 MG: 20 TABLET ORAL at 08:50

## 2019-06-10 RX ADMIN — RANOLAZINE 500 MG: 500 TABLET, FILM COATED, EXTENDED RELEASE ORAL at 08:50

## 2019-06-10 RX ADMIN — METOPROLOL SUCCINATE 75 MG: 50 TABLET, EXTENDED RELEASE ORAL at 08:50

## 2019-06-10 RX ADMIN — FAMOTIDINE 20 MG: 20 TABLET ORAL at 17:32

## 2019-06-10 RX ADMIN — RANOLAZINE 500 MG: 500 TABLET, FILM COATED, EXTENDED RELEASE ORAL at 17:32

## 2019-06-10 RX ADMIN — CHLORDIAZEPOXIDE HYDROCHLORIDE 25 MG: 25 CAPSULE ORAL at 22:24

## 2019-06-10 RX ADMIN — TAMSULOSIN HYDROCHLORIDE 0.4 MG: 0.4 CAPSULE ORAL at 16:36

## 2019-06-10 RX ADMIN — HEPARIN SODIUM 5000 UNITS: 5000 INJECTION INTRAVENOUS; SUBCUTANEOUS at 22:24

## 2019-06-10 RX ADMIN — INSULIN LISPRO 1 UNITS: 100 INJECTION, SOLUTION INTRAVENOUS; SUBCUTANEOUS at 11:56

## 2019-06-10 RX ADMIN — HEPARIN SODIUM 5000 UNITS: 5000 INJECTION INTRAVENOUS; SUBCUTANEOUS at 14:34

## 2019-06-11 VITALS
TEMPERATURE: 97.8 F | BODY MASS INDEX: 26.95 KG/M2 | OXYGEN SATURATION: 92 % | RESPIRATION RATE: 18 BRPM | DIASTOLIC BLOOD PRESSURE: 74 MMHG | SYSTOLIC BLOOD PRESSURE: 138 MMHG | WEIGHT: 210 LBS | HEART RATE: 69 BPM | HEIGHT: 74 IN

## 2019-06-11 LAB
ANION GAP SERPL CALCULATED.3IONS-SCNC: 8 MMOL/L (ref 4–13)
BUN SERPL-MCNC: 25 MG/DL (ref 5–25)
CALCIUM SERPL-MCNC: 8.8 MG/DL (ref 8.3–10.1)
CHLORIDE SERPL-SCNC: 104 MMOL/L (ref 100–108)
CO2 SERPL-SCNC: 27 MMOL/L (ref 21–32)
CREAT SERPL-MCNC: 1.16 MG/DL (ref 0.6–1.3)
ERYTHROCYTE [DISTWIDTH] IN BLOOD BY AUTOMATED COUNT: 13.3 % (ref 11.6–15.1)
GFR SERPL CREATININE-BSD FRML MDRD: 70 ML/MIN/1.73SQ M
GLUCOSE SERPL-MCNC: 104 MG/DL (ref 65–140)
GLUCOSE SERPL-MCNC: 105 MG/DL (ref 65–140)
GLUCOSE SERPL-MCNC: 97 MG/DL (ref 65–140)
HCT VFR BLD AUTO: 39.3 % (ref 36.5–49.3)
HGB BLD-MCNC: 12.7 G/DL (ref 12–17)
MAGNESIUM SERPL-MCNC: 1.8 MG/DL (ref 1.6–2.6)
MCH RBC QN AUTO: 32.7 PG (ref 26.8–34.3)
MCHC RBC AUTO-ENTMCNC: 32.3 G/DL (ref 31.4–37.4)
MCV RBC AUTO: 101 FL (ref 82–98)
PHOSPHATE SERPL-MCNC: 3.9 MG/DL (ref 2.7–4.5)
PLATELET # BLD AUTO: 127 THOUSANDS/UL (ref 149–390)
PMV BLD AUTO: 11.4 FL (ref 8.9–12.7)
POTASSIUM SERPL-SCNC: 4.2 MMOL/L (ref 3.5–5.3)
RBC # BLD AUTO: 3.88 MILLION/UL (ref 3.88–5.62)
SODIUM SERPL-SCNC: 139 MMOL/L (ref 136–145)
WBC # BLD AUTO: 6.59 THOUSAND/UL (ref 4.31–10.16)

## 2019-06-11 PROCEDURE — 80048 BASIC METABOLIC PNL TOTAL CA: CPT | Performed by: NURSE PRACTITIONER

## 2019-06-11 PROCEDURE — 82948 REAGENT STRIP/BLOOD GLUCOSE: CPT

## 2019-06-11 PROCEDURE — 84100 ASSAY OF PHOSPHORUS: CPT | Performed by: NURSE PRACTITIONER

## 2019-06-11 PROCEDURE — 85027 COMPLETE CBC AUTOMATED: CPT | Performed by: NURSE PRACTITIONER

## 2019-06-11 PROCEDURE — 83735 ASSAY OF MAGNESIUM: CPT | Performed by: NURSE PRACTITIONER

## 2019-06-11 PROCEDURE — 99239 HOSP IP/OBS DSCHRG MGMT >30: CPT | Performed by: NURSE PRACTITIONER

## 2019-06-11 RX ORDER — CLOPIDOGREL BISULFATE 75 MG/1
75 TABLET ORAL DAILY
Qty: 30 TABLET | Refills: 0 | Status: SHIPPED | OUTPATIENT
Start: 2019-06-11 | End: 2020-08-07 | Stop reason: SDUPTHER

## 2019-06-11 RX ORDER — LANOLIN ALCOHOL/MO/W.PET/CERES
100 CREAM (GRAM) TOPICAL DAILY
Qty: 30 TABLET | Refills: 0 | Status: SHIPPED | OUTPATIENT
Start: 2019-06-12 | End: 2019-07-22 | Stop reason: SDUPTHER

## 2019-06-11 RX ORDER — ROSUVASTATIN CALCIUM 20 MG/1
20 TABLET, COATED ORAL DAILY
Qty: 30 TABLET | Refills: 0 | Status: SHIPPED | OUTPATIENT
Start: 2019-06-11 | End: 2020-08-10 | Stop reason: SDUPTHER

## 2019-06-11 RX ORDER — CHLORDIAZEPOXIDE HYDROCHLORIDE 10 MG/1
10 CAPSULE, GELATIN COATED ORAL EVERY 12 HOURS
Qty: 30 CAPSULE | Refills: 0 | Status: SHIPPED | OUTPATIENT
Start: 2019-06-11 | End: 2019-10-17

## 2019-06-11 RX ORDER — RANOLAZINE 500 MG/1
500 TABLET, EXTENDED RELEASE ORAL 2 TIMES DAILY
Qty: 60 TABLET | Refills: 0 | Status: SHIPPED | OUTPATIENT
Start: 2019-06-11 | End: 2019-08-05 | Stop reason: SDUPTHER

## 2019-06-11 RX ORDER — NICOTINE 21 MG/24HR
1 PATCH, TRANSDERMAL 24 HOURS TRANSDERMAL DAILY
Qty: 28 PATCH | Refills: 0 | Status: SHIPPED | OUTPATIENT
Start: 2019-06-12 | End: 2019-10-17

## 2019-06-11 RX ORDER — CHLORDIAZEPOXIDE HYDROCHLORIDE 5 MG/1
10 CAPSULE, GELATIN COATED ORAL ONCE
Status: COMPLETED | OUTPATIENT
Start: 2019-06-11 | End: 2019-06-11

## 2019-06-11 RX ORDER — FOLIC ACID 1 MG/1
1 TABLET ORAL DAILY
Qty: 30 TABLET | Refills: 0 | Status: SHIPPED | OUTPATIENT
Start: 2019-06-12 | End: 2019-07-22 | Stop reason: SDUPTHER

## 2019-06-11 RX ADMIN — SERTRALINE HYDROCHLORIDE 100 MG: 100 TABLET ORAL at 10:12

## 2019-06-11 RX ADMIN — CHLORDIAZEPOXIDE HYDROCHLORIDE 25 MG: 25 CAPSULE ORAL at 05:39

## 2019-06-11 RX ADMIN — NALTREXONE HYDROCHLORIDE 50 MG: 50 TABLET, FILM COATED ORAL at 10:14

## 2019-06-11 RX ADMIN — MAGNESIUM OXIDE TAB 400 MG (241.3 MG ELEMENTAL MG) 400 MG: 400 (241.3 MG) TAB at 10:13

## 2019-06-11 RX ADMIN — FAMOTIDINE 20 MG: 20 TABLET ORAL at 10:13

## 2019-06-11 RX ADMIN — Medication 100 MG: at 10:12

## 2019-06-11 RX ADMIN — FOLIC ACID 1 MG: 1 TABLET ORAL at 10:12

## 2019-06-11 RX ADMIN — RANOLAZINE 500 MG: 500 TABLET, FILM COATED, EXTENDED RELEASE ORAL at 10:15

## 2019-06-11 RX ADMIN — FLUTICASONE FUROATE AND VILANTEROL TRIFENATATE 1 PUFF: 200; 25 POWDER RESPIRATORY (INHALATION) at 10:14

## 2019-06-11 RX ADMIN — NICOTINE 1 PATCH: 21 PATCH, EXTENDED RELEASE TRANSDERMAL at 10:14

## 2019-06-11 RX ADMIN — CLOPIDOGREL BISULFATE 75 MG: 75 TABLET ORAL at 10:14

## 2019-06-11 RX ADMIN — METOPROLOL SUCCINATE 75 MG: 50 TABLET, EXTENDED RELEASE ORAL at 10:13

## 2019-06-11 RX ADMIN — CHLORDIAZEPOXIDE HYDROCHLORIDE 10 MG: 5 CAPSULE ORAL at 12:53

## 2019-06-11 RX ADMIN — B-COMPLEX W/ C & FOLIC ACID TAB 1 TABLET: TAB at 10:12

## 2019-06-11 RX ADMIN — AMLODIPINE BESYLATE 5 MG: 5 TABLET ORAL at 10:13

## 2019-06-11 RX ADMIN — HEPARIN SODIUM 5000 UNITS: 5000 INJECTION INTRAVENOUS; SUBCUTANEOUS at 05:39

## 2019-06-11 RX ADMIN — TIOTROPIUM BROMIDE 18 MCG: 18 CAPSULE ORAL; RESPIRATORY (INHALATION) at 10:15

## 2019-07-02 ENCOUNTER — HOSPITAL ENCOUNTER (EMERGENCY)
Facility: HOSPITAL | Age: 59
Discharge: HOME | End: 2019-07-02
Attending: EMERGENCY MEDICINE
Payer: MEDICARE

## 2019-07-02 ENCOUNTER — APPOINTMENT (EMERGENCY)
Dept: RADIOLOGY | Facility: HOSPITAL | Age: 59
End: 2019-07-02
Payer: MEDICARE

## 2019-07-02 VITALS
TEMPERATURE: 99.2 F | DIASTOLIC BLOOD PRESSURE: 84 MMHG | WEIGHT: 235 LBS | BODY MASS INDEX: 30.16 KG/M2 | SYSTOLIC BLOOD PRESSURE: 147 MMHG | HEIGHT: 74 IN | OXYGEN SATURATION: 96 % | RESPIRATION RATE: 18 BRPM | HEART RATE: 113 BPM

## 2019-07-02 DIAGNOSIS — S20.211A CONTUSION OF RIB ON RIGHT SIDE, INITIAL ENCOUNTER: Primary | ICD-10-CM

## 2019-07-02 PROCEDURE — 71046 X-RAY EXAM CHEST 2 VIEWS: CPT

## 2019-07-02 PROCEDURE — 93005 ELECTROCARDIOGRAM TRACING: CPT

## 2019-07-02 PROCEDURE — 99284 EMERGENCY DEPT VISIT MOD MDM: CPT

## 2019-07-02 RX ORDER — ACETAMINOPHEN 325 MG/1
650 TABLET ORAL ONCE
Status: COMPLETED | OUTPATIENT
Start: 2019-07-02 | End: 2019-07-02

## 2019-07-02 RX ORDER — LIDOCAINE 50 MG/G
1 PATCH TOPICAL DAILY
Qty: 15 PATCH | Refills: 0 | Status: SHIPPED | OUTPATIENT
Start: 2019-07-02 | End: 2019-10-17

## 2019-07-02 RX ORDER — LIDOCAINE 50 MG/G
1 PATCH TOPICAL ONCE
Status: DISCONTINUED | OUTPATIENT
Start: 2019-07-02 | End: 2019-07-02 | Stop reason: HOSPADM

## 2019-07-02 RX ADMIN — ACETAMINOPHEN 650 MG: 325 TABLET, FILM COATED ORAL at 08:36

## 2019-07-02 RX ADMIN — LIDOCAINE 1 PATCH: 50 PATCH TOPICAL at 08:36

## 2019-07-02 NOTE — ED PROCEDURE NOTE
PROCEDURE  ECG 12 Lead Documentation Only  Date/Time: 7/2/2019 7:55 AM  Performed by: Maddison Mckeon DO  Authorized by: Maddison Mckeon DO     ECG reviewed by me, the ED Provider: yes    Patient location:  ED  Interpretation:     Interpretation: abnormal    Rate:     ECG rate:  107    ECG rate assessment: tachycardic    Rhythm:     Rhythm: sinus rhythm    Ectopy:     Ectopy: none    Conduction:     Conduction: normal    ST segments:     ST segments:  Non-specific    Depression:  V4, V5 and V6  T waves:     T waves: normal    Other findings:     Other findings: [address], DO  07/02/19 5830

## 2019-07-02 NOTE — ED NOTES
Pt states that he already has an incentive spirometer at home, informed of the importance of using it regularly and confirmed that pt knows how to use properly       Vicente Oliver RN  07/02/19 4744

## 2019-07-03 ENCOUNTER — HOSPITAL ENCOUNTER (EMERGENCY)
Facility: HOSPITAL | Age: 59
Discharge: HOME | End: 2019-07-03
Attending: EMERGENCY MEDICINE
Payer: MEDICARE

## 2019-07-03 VITALS
OXYGEN SATURATION: 95 % | TEMPERATURE: 98 F | RESPIRATION RATE: 20 BRPM | DIASTOLIC BLOOD PRESSURE: 81 MMHG | SYSTOLIC BLOOD PRESSURE: 157 MMHG | HEART RATE: 106 BPM

## 2019-07-03 DIAGNOSIS — R07.81 RIB PAIN ON RIGHT SIDE: Primary | ICD-10-CM

## 2019-07-03 LAB
ATRIAL RATE: 107 BPM
P AXIS: 75 DEGREES
PR INTERVAL: 120 MS
QRS AXIS: 63 DEGREES
QRSD INTERVAL: 82 MS
QT INTERVAL: 316 MS
QTC INTERVAL: 421 MS
T WAVE AXIS: 59 DEGREES
VENTRICULAR RATE: 107 BPM

## 2019-07-03 PROCEDURE — 99283 EMERGENCY DEPT VISIT LOW MDM: CPT

## 2019-07-03 PROCEDURE — 93010 ELECTROCARDIOGRAM REPORT: CPT | Performed by: INTERNAL MEDICINE

## 2019-07-03 RX ORDER — ACETAMINOPHEN 325 MG/1
650 TABLET ORAL EVERY 4 HOURS PRN
Qty: 30 TABLET | Refills: 0 | Status: SHIPPED | OUTPATIENT
Start: 2019-07-03 | End: 2019-10-17

## 2019-07-03 RX ORDER — LIDOCAINE 50 MG/G
1 PATCH TOPICAL ONCE
Status: DISCONTINUED | OUTPATIENT
Start: 2019-07-03 | End: 2019-07-03 | Stop reason: HOSPADM

## 2019-07-03 RX ORDER — LIDOCAINE 40 MG/G
CREAM TOPICAL AS NEEDED
Qty: 30 G | Refills: 0 | Status: SHIPPED | OUTPATIENT
Start: 2019-07-03 | End: 2019-10-17

## 2019-07-03 RX ORDER — ACETAMINOPHEN 325 MG/1
650 TABLET ORAL ONCE
Status: COMPLETED | OUTPATIENT
Start: 2019-07-03 | End: 2019-07-03

## 2019-07-03 RX ADMIN — ACETAMINOPHEN 650 MG: 325 TABLET, FILM COATED ORAL at 08:52

## 2019-07-03 RX ADMIN — LIDOCAINE 1 PATCH: 50 PATCH TOPICAL at 08:52

## 2019-07-03 NOTE — DISCHARGE INSTRUCTIONS
Rib Contusion   WHAT YOU NEED TO KNOW:   A rib contusion is a bruise on one or more of your ribs  DISCHARGE INSTRUCTIONS:   Return to the emergency department if:   · You have increased chest pain  · You have shortness of breath  · You start to cough up blood  · Your pain does not improve with pain medicine  Contact your healthcare provider if:   · You have a cough  · You have a fever  · You have questions or concerns about your condition or care  Medicines: You may need any of the following:  · NSAIDs , such as ibuprofen, help decrease swelling, pain, and fever  This medicine is available with or without a doctor's order  NSAIDs can cause stomach bleeding or kidney problems in certain people  If you take blood thinner medicine, always ask if NSAIDs are safe for you  Always read the medicine label and follow directions  Do not give these medicines to children under 10months of age without direction from your child's healthcare provider  · Prescription pain medicine  may be given  Ask how to take this medicine safely  · Take your medicine as directed  Contact your healthcare provider if you think your medicine is not helping or if you have side effects  Tell him of her if you are allergic to any medicine  Keep a list of the medicines, vitamins, and herbs you take  Include the amounts, and when and why you take them  Bring the list or the pill bottles to follow-up visits  Carry your medicine list with you in case of an emergency  Deep breathing:   · To help prevent pneumonia, take 10 deep breaths every hour, even when you wake up during the night  Brace your ribs with your hands or a pillow while you take deep breaths or cough  This will help decrease your pain  · You may need to use an incentive spirometer to help you take deeper breaths  Put the plastic piece into your mouth and take a very deep breath  Hold your breath as long as you can  Then let out your breath   Do this 10 times in a row every hour while you are awake  Rest:  Rest your ribs to decrease swelling and allow the injury to heal faster  Avoid activities that may cause more pain or damage to your ribs  As your pain decreases, begin movements slowly  Ice:  Ice helps decrease swelling and pain  Ice may also help prevent tissue damage  Use an ice pack or put crushed ice in a plastic bag  Cover it with a towel and place it on your bruised area for 15 to 20 minutes every hour as directed  Follow up with your healthcare provider as directed:  Write down your questions so you remember to ask them during your visits  © 2017 [address] [facility] Information is for End User's use only and may not be sold, redistributed or otherwise used for commercial purposes  All illustrations and images included in CareNotes® are the copyrighted property of [employer]  or Mick Milner  The above information is an  only  It is not intended as medical advice for individual conditions or treatments  Talk to your doctor, nurse or pharmacist before following any medical regimen to see if it is safe and effective for you

## 2019-07-03 NOTE — ED PROVIDER NOTES
History  Chief Complaint   Patient presents with    Back Pain     BL thoracic back pain x 4 days R>L  Patient presents for right sided back pain radiating around the side of his ribs  Worse with movement and cough  Thinks he has pneumonia  No fever or chills  Bonilla Adame a few days ago and injured right ribs  History provided by:  Patient   used: No    Back Pain       Prior to Admission Medications   Prescriptions Last Dose Informant Patient Reported? Taking?    TAMSULOSIN HCL PO  Self Yes No   Sig: Take 0 4 mg by mouth daily   al mag oxide-diphenhydramine-lidocaine viscous (MAGIC MOUTHWASH) 1:1:1 suspension   No No   Sig: Swish and swallow 10 mL every 4 (four) hours as needed for mouth pain or discomfort   albuterol (2 5 mg/3 mL) 0 083 % nebulizer solution   No No   Sig: Take 1 vial (2 5 mg total) by nebulization every 6 (six) hours as needed for wheezing   albuterol (PROVENTIL HFA,VENTOLIN HFA) 90 mcg/act inhaler   No No   Sig: Inhale 2 puffs every 4 (four) hours as needed for wheezing   amLODIPine (NORVASC) 5 mg tablet   Yes No   Sig: Take 5 mg by mouth daily   benzonatate (TESSALON PERLES) 100 mg capsule   No No   Sig: Take 1 capsule (100 mg total) by mouth 3 (three) times a day as needed for cough   chlordiazePOXIDE (LIBRIUM) 10 mg capsule   No No   Sig: Take 1 capsule (10 mg total) by mouth every 12 (twelve) hours for 4 doses   clopidogrel (PLAVIX) 75 mg tablet   No No   Sig: Take 1 tablet (75 mg total) by mouth daily   famotidine (PEPCID) 20 mg tablet   Yes No   Sig: Take 20 mg by mouth 2 (two) times a day   fluticasone-salmeterol (ADVAIR) 500-50 mcg/dose   Yes No   Sig: Inhale 1 puff every 12 (twelve) hours   folic acid (FOLVITE) 1 mg tablet   No No   Sig: Take 1 tablet (1 mg total) by mouth daily   lidocaine (LIDODERM) 5 %   No No   Sig: Place 1 patch on the skin daily for 10 days Remove & Discard patch within 12 hours or as directed by MD   lidocaine (XYLOCAINE) 2 % topical gel   No No   Sig: Apply 1 application topically 3 (three) times a day for 10 days   metFORMIN (GLUCOPHAGE) 1000 MG tablet   Yes No   Sig: Take 1,000 mg by mouth 2 (two) times a day with meals   metoprolol succinate (TOPROL-XL) 50 mg 24 hr tablet   Yes No   Sig: Take 75 mg by mouth daily   naltrexone (REVIA) 50 mg tablet   Yes No   Sig: Take 50 mg by mouth daily   nicotine (NICODERM CQ) 21 mg/24 hr TD 24 hr patch   No No   Sig: Place 1 patch on the skin daily   ranolazine (RANEXA) 500 mg 12 hr tablet   No No   Sig: Take 1 tablet (500 mg total) by mouth 2 (two) times a day   rosuvastatin (CRESTOR) 20 MG tablet   No No   Sig: Take 1 tablet (20 mg total) by mouth daily   sertraline (ZOLOFT) 100 mg tablet   Yes No   Sig: Take 100 mg by mouth daily    thiamine 100 MG tablet   No No   Sig: Take 1 tablet (100 mg total) by mouth daily   tiotropium (SPIRIVA) 18 mcg inhalation capsule  Self Yes No   Sig: Place 18 mcg into inhaler and inhale daily      Facility-Administered Medications: None       Past Medical History:   Diagnosis Date    Cancer [facility])     prostate ca,had radiation    Cardiac disease     stents,then triple bypass    [facility]     [facility] ([facility] )     ETOH abuse     Hx of heart artery stent     2014    Hyperlipidemia     Hypertension     Prostate CA ([facility] )     Renal disorder     pyelonephritis    S/P CABG x 1     2004       Past Surgical History:   Procedure Laterality Date    CORONARY ARTERY BYPASS GRAFT  2004    TONSILLECTOMY         History reviewed  No pertinent family history  I have reviewed and agree with the history as documented  Social History     Tobacco Use    Smoking status: Current Every Day Smoker     Packs/day: 2 00    Smokeless tobacco: Never Used   Substance Use Topics    Alcohol use: Yes     Comment:  one pint of vodka a day   Drug use: No        Review of Systems   Respiratory: Negative for shortness of breath      Musculoskeletal: Positive for back pain  All other systems reviewed and are negative  Physical Exam  Physical Exam   Constitutional: He is oriented to person, place, and time  No distress  Cardiovascular: Normal rate, regular rhythm and intact distal pulses  Pulmonary/Chest: Effort normal and breath sounds normal  No respiratory distress  He exhibits tenderness  Abdominal: Soft  There is no tenderness  Musculoskeletal: Normal range of motion  He exhibits tenderness  Arms:  Neurological: He is alert and oriented to person, place, and time  Skin: Capillary refill takes less than 2 seconds  He is not diaphoretic  Nursing note and vitals reviewed  Vital Signs  ED Triage Vitals [07/02/19 0743]   Temperature Pulse Respirations Blood Pressure SpO2   99 2 °F (37 3 °C) (!) 113 18 147/84 96 %      Temp Source Heart Rate Source Patient Position - Orthostatic VS BP Location FiO2 (%)   Oral -- Sitting Left arm --      Pain Score       8           Vitals:    07/02/19 0743   BP: 147/84   Pulse: (!) 113   Patient Position - Orthostatic VS: Sitting         Visual Acuity      ED Medications  Medications   acetaminophen (TYLENOL) tablet 650 mg (650 mg Oral Given 7/2/19 0836)       Diagnostic Studies  Results Reviewed     None                 XR chest 2 views   Final Result by Pradeep Lockwood MD (07/02 8416)      Scattered right lower lobe linear atelectasis  No pneumothorax or displaced rib fracture              Workstation performed: [id number]                    Procedures  Procedures       ED Course                               MDM  Number of Diagnoses or Management Options  Contusion of rib on right side, initial encounter:   Diagnosis management comments: Pulse ox 95% on RA indicating adequate oxygenation  CXR: NAD as read by me       Amount and/or Complexity of Data Reviewed  Tests in the radiology section of CPT®: ordered and reviewed  Decide to obtain previous medical records or to obtain history from someone other than the patient: yes  Review and summarize past medical records: yes  Independent visualization of images, tracings, or specimens: yes    Patient Progress  Patient progress: stable      Disposition  Final diagnoses:   Contusion of rib on right side, initial encounter     Time reflects when diagnosis was documented in both MDM as applicable and the Disposition within this note     Time User Action Codes Description Comment    7/2/2019  8:58 AM Michoacano Jimenez Add [S20 211A] Contusion of rib on right side, initial encounter       ED Disposition     ED Disposition Condition Date/Time Comment    Discharge Stable Tue Jul 2, 2019  8:57 AM Miguel Raphael discharge to home/self care              Follow-up Information     Follow up With Specialties Details Why Contact Info Additional Information    Martín Mccarthy  In 3 days  [address] Emergency Department Emergency Medicine  If symptoms worsen [address] [facility] [address]  [phone] [facility], [place], [place], [address]          Discharge Medication List as of 7/2/2019  8:59 AM      CONTINUE these medications which have CHANGED    Details   lidocaine (LIDODERM) 5 % Apply 1 patch topically daily for 15 doses Remove & Discard patch within 12 hours or as directed by MD, Starting Tue 7/2/2019, Until Wed 7/17/2019, Normal         CONTINUE these medications which have NOT CHANGED    Details   al mag oxide-diphenhydramine-lidocaine viscous (MAGIC MOUTHWASH) 1:1:1 suspension Swish and swallow 10 mL every 4 (four) hours as needed for mouth pain or discomfort, Starting Thu 3/7/2019, Print      albuterol (2 5 mg/3 mL) 0 083 % nebulizer solution Take 1 vial (2 5 mg total) by nebulization every 6 (six) hours as needed for wheezing, Starting Thu 3/7/2019, Print      albuterol (PROVENTIL HFA,VENTOLIN HFA) 90 mcg/act inhaler Inhale 2 puffs every 4 (four) hours as needed for wheezing, Starting Fri 10/12/2018, Print      amLODIPine (NORVASC) 5 mg tablet Take 5 mg by mouth daily, Historical Med      benzonatate (TESSALON PERLES) 100 mg capsule Take 1 capsule (100 mg total) by mouth 3 (three) times a day as needed for cough, Starting Thu 3/7/2019, Print      chlordiazePOXIDE (LIBRIUM) 10 mg capsule Take 1 capsule (10 mg total) by mouth every 12 (twelve) hours for 4 doses, Starting Tue 6/11/2019, Until Thu 6/13/2019, Normal      clopidogrel (PLAVIX) 75 mg tablet Take 1 tablet (75 mg total) by mouth daily, Starting Tue 6/11/2019, Normal      famotidine (PEPCID) 20 mg tablet Take 20 mg by mouth 2 (two) times a day, Historical Med      fluticasone-salmeterol (ADVAIR) 500-50 mcg/dose Inhale 1 puff every 12 (twelve) hours, Historical Med      folic acid (FOLVITE) 1 mg tablet Take 1 tablet (1 mg total) by mouth daily, Starting Wed 6/12/2019, Normal      lidocaine (XYLOCAINE) 2 % topical gel Apply 1 application topically 3 (three) times a day for 10 days, Starting Fri 4/27/2018, Until Mon 5/7/2018, Print      metFORMIN (GLUCOPHAGE) 1000 MG tablet Take 1,000 mg by mouth 2 (two) times a day with meals, Historical Med      metoprolol succinate (TOPROL-XL) 50 mg 24 hr tablet Take 75 mg by mouth daily, Historical Med      naltrexone (REVIA) 50 mg tablet Take 50 mg by mouth daily, Historical Med      nicotine (NICODERM CQ) 21 mg/24 hr TD 24 hr patch Place 1 patch on the skin daily, Starting Wed 6/12/2019, Normal      ranolazine (RANEXA) 500 mg 12 hr tablet Take 1 tablet (500 mg total) by mouth 2 (two) times a day, Starting Tue 6/11/2019, Normal      rosuvastatin (CRESTOR) 20 MG tablet Take 1 tablet (20 mg total) by mouth daily, Starting Tue 6/11/2019, Normal      sertraline (ZOLOFT) 100 mg tablet Take 100 mg by mouth daily , Historical Med      TAMSULOSIN HCL PO Take 0 4 mg by mouth daily, Historical Med      thiamine 100 MG tablet Take 1 tablet (100 mg total) by mouth daily, Starting Wed 6/12/2019, Normal      tiotropium (SPIRIVA) 18 mcg inhalation capsule Place 18 mcg into inhaler and inhale daily, Historical Med           No discharge procedures on file      ED Provider  Electronically Signed by           Antonia Koo DO  07/03/19 5225

## 2019-07-04 NOTE — ED PROVIDER NOTES
History  Chief Complaint   Patient presents with    Rib Pain     patient states he was here yesterday and diagnosed with "3 cracked ribs"  states the pain medication and the Lidocaine patch is not helping his pain, and that his insurance won't cover the Lidocaine patches  Patient presents for evaluation of rib pain  States he was unable to fill the lidocaine patch because insurance doesn't cover it  States he cant afford any otc medications  History provided by:  Patient   used: No        Prior to Admission Medications   Prescriptions Last Dose Informant Patient Reported? Taking?    TAMSULOSIN HCL PO  Self Yes No   Sig: Take 0 4 mg by mouth daily   al mag oxide-diphenhydramine-lidocaine viscous (MAGIC MOUTHWASH) 1:1:1 suspension   No No   Sig: Swish and swallow 10 mL every 4 (four) hours as needed for mouth pain or discomfort   albuterol (2 5 mg/3 mL) 0 083 % nebulizer solution   No No   Sig: Take 1 vial (2 5 mg total) by nebulization every 6 (six) hours as needed for wheezing   albuterol (PROVENTIL HFA,VENTOLIN HFA) 90 mcg/act inhaler   No No   Sig: Inhale 2 puffs every 4 (four) hours as needed for wheezing   amLODIPine (NORVASC) 5 mg tablet   Yes No   Sig: Take 5 mg by mouth daily   benzonatate (TESSALON PERLES) 100 mg capsule   No No   Sig: Take 1 capsule (100 mg total) by mouth 3 (three) times a day as needed for cough   chlordiazePOXIDE (LIBRIUM) 10 mg capsule   No No   Sig: Take 1 capsule (10 mg total) by mouth every 12 (twelve) hours for 4 doses   clopidogrel (PLAVIX) 75 mg tablet   No No   Sig: Take 1 tablet (75 mg total) by mouth daily   famotidine (PEPCID) 20 mg tablet   Yes No   Sig: Take 20 mg by mouth 2 (two) times a day   fluticasone-salmeterol (ADVAIR) 500-50 mcg/dose   Yes No   Sig: Inhale 1 puff every 12 (twelve) hours   folic acid (FOLVITE) 1 mg tablet   No No   Sig: Take 1 tablet (1 mg total) by mouth daily   lidocaine (LIDODERM) 5 %   No No   Sig: Apply 1 patch topically daily for 15 doses Remove & Discard patch within 12 hours or as directed by MD   lidocaine (XYLOCAINE) 2 % topical gel   No No   Sig: Apply 1 application topically 3 (three) times a day for 10 days   metFORMIN (GLUCOPHAGE) 1000 MG tablet   Yes No   Sig: Take 1,000 mg by mouth 2 (two) times a day with meals   metoprolol succinate (TOPROL-XL) 50 mg 24 hr tablet   Yes No   Sig: Take 75 mg by mouth daily   naltrexone (REVIA) 50 mg tablet   Yes No   Sig: Take 50 mg by mouth daily   nicotine (NICODERM CQ) 21 mg/24 hr TD 24 hr patch   No No   Sig: Place 1 patch on the skin daily   ranolazine (RANEXA) 500 mg 12 hr tablet   No No   Sig: Take 1 tablet (500 mg total) by mouth 2 (two) times a day   rosuvastatin (CRESTOR) 20 MG tablet   No No   Sig: Take 1 tablet (20 mg total) by mouth daily   sertraline (ZOLOFT) 100 mg tablet   Yes No   Sig: Take 100 mg by mouth daily    thiamine 100 MG tablet   No No   Sig: Take 1 tablet (100 mg total) by mouth daily   tiotropium (SPIRIVA) 18 mcg inhalation capsule  Self Yes No   Sig: Place 18 mcg into inhaler and inhale daily      Facility-Administered Medications: None       Past Medical History:   Diagnosis Date    Cancer [facility])     prostate ca,had radiation    Cardiac disease     stents,then triple bypass    [facility]     Diabetes mellitus ([facility] )     ETOH abuse     Hx of heart artery stent     2014    Hyperlipidemia     Hypertension     Prostate CA ([facility] )     Renal disorder     pyelonephritis    S/P CABG x 1     2004       Past Surgical History:   Procedure Laterality Date    CORONARY ARTERY BYPASS GRAFT  2004    TONSILLECTOMY         History reviewed  No pertinent family history  I have reviewed and agree with the history as documented      Social History     Tobacco Use    Smoking status: Current Every Day Smoker     Packs/day: 2 00    Smokeless tobacco: Never Used   Substance Use Topics    Alcohol use: Yes     Comment:  one pint of vodka a day   Drug use: No        Review of Systems   All other systems reviewed and are negative  Physical Exam  Physical Exam   Constitutional: He is oriented to person, place, and time  No distress  Cardiovascular: Normal rate, regular rhythm and intact distal pulses  Pulmonary/Chest: Effort normal and breath sounds normal  No respiratory distress  He exhibits tenderness  Abdominal: Soft  There is no tenderness  Musculoskeletal: He exhibits tenderness  Arms:  Neurological: He is alert and oriented to person, place, and time  Skin: Capillary refill takes less than 2 seconds  He is not diaphoretic  Nursing note and vitals reviewed  Vital Signs  ED Triage Vitals [07/03/19 0756]   Temperature Pulse Respirations Blood Pressure SpO2   98 °F (36 7 °C) (!) 106 20 157/81 95 %      Temp Source Heart Rate Source Patient Position - Orthostatic VS BP Location FiO2 (%)   Oral Monitor Sitting Left arm --      Pain Score       9           Vitals:    07/03/19 0756   BP: 157/81   Pulse: (!) 106   Patient Position - Orthostatic VS: Sitting         Visual Acuity      ED Medications  Medications   acetaminophen (TYLENOL) tablet 650 mg (650 mg Oral Given 7/3/19 1391)       Diagnostic Studies  Results Reviewed     None                 No orders to display              Procedures  Procedures       ED Course                               MDM  Number of Diagnoses or Management Options  Rib pain on right side:   Diagnosis management comments: Pulse ox 95% on RA indicating adequate oxygenation      Will try lidocaine cream to see if that is covered  Recommended Tylenol/Ibuprofen  Patient preferred the Tylenol because of the medications he is on said to be careful with ibuprofen  Will give prescription since cant afford otc version  Recommended against opioids given his history of substance abuse and that he hasn't even been trying the Tylenol/Motrin          Amount and/or Complexity of Data Reviewed  Decide to obtain previous medical records or to obtain history from someone other than the patient: yes  Review and summarize past medical records: yes    Patient Progress  Patient progress: stable      Disposition  Final diagnoses:   Rib pain on right side     Time reflects when diagnosis was documented in both MDM as applicable and the Disposition within this note     Time User Action Codes Description Comment    7/3/2019  8:25 AM Jacqueline Weems Add [R07 81] Rib pain on right side       ED Disposition     ED Disposition Condition Date/Time Comment    Discharge Stable Wed Jul 3, 2019  8:25 AM Alex Maravilla discharge to home/self care              Follow-up Information     Follow up With Specialties Details Why Contact Liat Granger Goes  In 3 days  [address] [facility]            Discharge Medication List as of 7/3/2019  8:28 AM      START taking these medications    Details   acetaminophen (TYLENOL) 325 mg tablet Take 2 tablets (650 mg total) by mouth every 4 (four) hours as needed for mild pain, Starting Wed 7/3/2019, Normal      lidocaine (LMX) 4 % cream Apply topically as needed for mild pain, Starting Wed 7/3/2019, Normal         CONTINUE these medications which have NOT CHANGED    Details   al mag oxide-diphenhydramine-lidocaine viscous (MAGIC MOUTHWASH) 1:1:1 suspension Swish and swallow 10 mL every 4 (four) hours as needed for mouth pain or discomfort, Starting Thu 3/7/2019, Print      albuterol (2 5 mg/3 mL) 0 083 % nebulizer solution Take 1 vial (2 5 mg total) by nebulization every 6 (six) hours as needed for wheezing, Starting Thu 3/7/2019, Print      albuterol (PROVENTIL HFA,VENTOLIN HFA) 90 mcg/act inhaler Inhale 2 puffs every 4 (four) hours as needed for wheezing, Starting Fri 10/12/2018, Print      amLODIPine (NORVASC) 5 mg tablet Take 5 mg by mouth daily, Historical Med      benzonatate (TESSALON PERLES) 100 mg capsule Take 1 capsule (100 mg total) by mouth 3 (three) times a day as needed for cough, Starting Thu 3/7/2019, Print      chlordiazePOXIDE (LIBRIUM) 10 mg capsule Take 1 capsule (10 mg total) by mouth every 12 (twelve) hours for 4 doses, Starting Tue 6/11/2019, Until Thu 6/13/2019, Normal      clopidogrel (PLAVIX) 75 mg tablet Take 1 tablet (75 mg total) by mouth daily, Starting Tue 6/11/2019, Normal      famotidine (PEPCID) 20 mg tablet Take 20 mg by mouth 2 (two) times a day, Historical Med      fluticasone-salmeterol (ADVAIR) 500-50 mcg/dose Inhale 1 puff every 12 (twelve) hours, Historical Med      folic acid (FOLVITE) 1 mg tablet Take 1 tablet (1 mg total) by mouth daily, Starting Wed 6/12/2019, Normal      lidocaine (LIDODERM) 5 % Apply 1 patch topically daily for 15 doses Remove & Discard patch within 12 hours or as directed by MD, Starting Tue 7/2/2019, Until Wed 7/17/2019, Normal      lidocaine (XYLOCAINE) 2 % topical gel Apply 1 application topically 3 (three) times a day for 10 days, Starting Fri 4/27/2018, Until Mon 5/7/2018, Print      metFORMIN (GLUCOPHAGE) 1000 MG tablet Take 1,000 mg by mouth 2 (two) times a day with meals, Historical Med      metoprolol succinate (TOPROL-XL) 50 mg 24 hr tablet Take 75 mg by mouth daily, Historical Med      naltrexone (REVIA) 50 mg tablet Take 50 mg by mouth daily, Historical Med      nicotine (NICODERM CQ) 21 mg/24 hr TD 24 hr patch Place 1 patch on the skin daily, Starting Wed 6/12/2019, Normal      ranolazine (RANEXA) 500 mg 12 hr tablet Take 1 tablet (500 mg total) by mouth 2 (two) times a day, Starting Tue 6/11/2019, Normal      rosuvastatin (CRESTOR) 20 MG tablet Take 1 tablet (20 mg total) by mouth daily, Starting Tue 6/11/2019, Normal      sertraline (ZOLOFT) 100 mg tablet Take 100 mg by mouth daily , Historical Med      TAMSULOSIN HCL PO Take 0 4 mg by mouth daily, Historical Med      thiamine 100 MG tablet Take 1 tablet (100 mg total) by mouth daily, Starting Wed 6/12/2019, Normal      tiotropium (SPIRIVA) 18 mcg inhalation capsule Place 18 mcg into inhaler and inhale daily, Historical Med           No discharge procedures on file      ED Provider  Electronically Signed by           Antonia Koo DO  07/04/19 3823

## 2019-07-22 ENCOUNTER — PATIENT OUTREACH (OUTPATIENT)
Dept: FAMILY MEDICINE CLINIC | Facility: CLINIC | Age: 59
End: 2019-07-22

## 2019-07-22 ENCOUNTER — OFFICE VISIT (OUTPATIENT)
Dept: FAMILY MEDICINE CLINIC | Facility: CLINIC | Age: 59
End: 2019-07-22
Payer: MEDICARE

## 2019-07-22 VITALS
TEMPERATURE: 98.4 F | WEIGHT: 201 LBS | OXYGEN SATURATION: 100 % | HEIGHT: 74 IN | DIASTOLIC BLOOD PRESSURE: 90 MMHG | SYSTOLIC BLOOD PRESSURE: 160 MMHG | HEART RATE: 96 BPM | BODY MASS INDEX: 25.8 KG/M2

## 2019-07-22 DIAGNOSIS — F19.10 DRUG ABUSE (HCC): ICD-10-CM

## 2019-07-22 DIAGNOSIS — E11.00 TYPE 2 DIABETES MELLITUS WITH HYPEROSMOLARITY WITHOUT COMA, WITHOUT LONG-TERM CURRENT USE OF INSULIN (HCC): ICD-10-CM

## 2019-07-22 DIAGNOSIS — F10.929 ALCOHOL INTOXICATION (HCC): ICD-10-CM

## 2019-07-22 DIAGNOSIS — R07.81 RIB PAIN: ICD-10-CM

## 2019-07-22 DIAGNOSIS — Z71.6 ENCOUNTER FOR TOBACCO USE CESSATION COUNSELING: ICD-10-CM

## 2019-07-22 DIAGNOSIS — I10 ESSENTIAL HYPERTENSION: ICD-10-CM

## 2019-07-22 DIAGNOSIS — Z76.89 ESTABLISHING CARE WITH NEW DOCTOR, ENCOUNTER FOR: Primary | ICD-10-CM

## 2019-07-22 DIAGNOSIS — C61 PROSTATE CANCER (HCC): ICD-10-CM

## 2019-07-22 DIAGNOSIS — Z12.11 SCREENING FOR COLON CANCER: ICD-10-CM

## 2019-07-22 PROCEDURE — 99214 OFFICE O/P EST MOD 30 MIN: CPT | Performed by: FAMILY MEDICINE

## 2019-07-22 RX ORDER — BLOOD-GLUCOSE METER
KIT MISCELLANEOUS
Refills: 0 | COMMUNITY
Start: 2019-05-16

## 2019-07-22 RX ORDER — LANOLIN ALCOHOL/MO/W.PET/CERES
100 CREAM (GRAM) TOPICAL DAILY
Qty: 30 TABLET | Refills: 0 | Status: SHIPPED | OUTPATIENT
Start: 2019-07-22 | End: 2019-07-22 | Stop reason: SDUPTHER

## 2019-07-22 RX ORDER — METOPROLOL SUCCINATE 50 MG/1
75 TABLET, EXTENDED RELEASE ORAL DAILY
Qty: 30 TABLET | Refills: 2 | Status: SHIPPED | OUTPATIENT
Start: 2019-07-22 | End: 2019-10-19 | Stop reason: HOSPADM

## 2019-07-22 RX ORDER — FOSINOPRIL SODIUM 10 MG/1
10 TABLET ORAL DAILY
Refills: 0 | Status: ON HOLD | COMMUNITY
Start: 2019-07-05 | End: 2019-12-25 | Stop reason: SDUPTHER

## 2019-07-22 RX ORDER — LANOLIN ALCOHOL/MO/W.PET/CERES
100 CREAM (GRAM) TOPICAL DAILY
Qty: 30 TABLET | Refills: 0 | Status: SHIPPED | OUTPATIENT
Start: 2019-07-22 | End: 2019-10-17

## 2019-07-22 RX ORDER — FOLIC ACID 1 MG/1
1 TABLET ORAL DAILY
Qty: 30 TABLET | Refills: 0 | Status: ON HOLD | OUTPATIENT
Start: 2019-07-22 | End: 2019-12-25 | Stop reason: SDUPTHER

## 2019-07-22 RX ORDER — ESOMEPRAZOLE MAGNESIUM 40 MG/1
CAPSULE, DELAYED RELEASE ORAL
Refills: 0 | COMMUNITY
Start: 2019-07-03 | End: 2019-10-17

## 2019-07-22 NOTE — PROGRESS NOTES
ADULT ANNUAL PHYSICAL  [facility] - [facility]    NAME: Rei Galvan  AGE: 64 y o  SEX: male  : 1960     DATE: 2019     Assessment and Plan:     Patient has extensive past medical history, including high utilization tendencies of the emergency room hospital, presents today to establish care  Contact social work, who spoke with patient regarding alcohol tobacco, drug use as well as to develop a high utilizer plan  Problem List Items Addressed This Visit        Endocrine    Type 2 diabetes mellitus ([place] [address] )    Relevant Orders    Microalbumin / creatinine urine ratio    Ambulatory referral to Podiatry    Ambulatory referral to Optometry    Ambulatory referral to Dentistry    Lipid panel      Other Visit Diagnoses     Establishing care with new doctor, encounter for    -  Primary    Alcohol intoxication ([place] [address] )        refered to social work      Relevant Medications    folic acid (FOLVITE) 1 mg tablet    thiamine 100 MG tablet    Drug abuse ([place] [address] )        refered to social work    Relevant Orders    Hepatitis C antibody    HIV 1/2 AG-AB combo    Prostate cancer ([place] [address] )        Relevant Orders    PSA Total, Diagnostic    Rib pain        Relevant Medications    diclofenac sodium (VOLTAREN) 1 %    Screening for colon cancer        Relevant Orders    Ambulatory referral to Gastroenterology    Essential hypertension        Relevant Medications    fosinopril (MONOPRIL) 10 mg tablet    metoprolol succinate (TOPROL-XL) 50 mg 24 hr tablet    Encounter for tobacco use cessation counseling        advised to make another appt to discuss cessation techniques          Immunizations and preventive care screenings were discussed with patient today  Appropriate education was printed on patient's after visit summary  Counseling:  · Alcohol/drug use: discussed moderation in alcohol intake and avoidance of illicit drug use  No follow-ups on file       Chief Complaint:     Chief Complaint Patient presents with    Medication Refill     famotine metoprolol folic acid thiamine tamosolin     Establish Care      History of Present Illness:     Adult Annual Physical   Patient here for a comprehensive physical exam  The patient reports needing med refills and having rib pain  Says he was in the emergency room earlier this month for rib pain, pain has gotten better however has not fully resolved  Reviewed ER notes which showed no acute findings pathology likely benign musculoskeletal in nature, ER x-ray negative  Patient stakes lidocaine and over-the-counter pain medications not effective    Diet and Physical Activity  · Diet/Nutrition: poor diet  · Exercise: 1-2 times a week on average  Depression Screening  PHQ-9 Depression Screening    PHQ-9:    Frequency of the following problems over the past two weeks:            General Health  · Sleep: gets more than 8 hours of sleep on average  · Hearing: normal - bilateral   · Vision: no vision problems  · Dental:no regular dental visits   Health  · Symptoms include: none     Review of Systems:     Review of Systems   Constitutional: Negative for activity change, appetite change, chills, diaphoresis, fatigue and fever  HENT: Negative for congestion, dental problem, drooling, ear discharge, ear pain, facial swelling and hearing loss  Eyes: Negative for pain, discharge, redness and itching  Respiratory: Positive for wheezing  Negative for apnea, cough, choking and shortness of breath  Gastrointestinal: Negative for abdominal distention, abdominal pain, anal bleeding, blood in stool, constipation, diarrhea and nausea  Genitourinary: Negative for difficulty urinating, discharge, dysuria, enuresis, flank pain, frequency, genital sores, hematuria, penile pain and penile swelling  Musculoskeletal: Negative for arthralgias, back pain, gait problem, joint swelling, myalgias and neck stiffness     Neurological: Positive for tremors and weakness  Negative for dizziness, seizures, facial asymmetry, speech difficulty, light-headedness, numbness and headaches  Psychiatric/Behavioral: Positive for dysphoric mood  Negative for agitation, behavioral problems, confusion, decreased concentration, hallucinations, self-injury and sleep disturbance  The patient is not nervous/anxious and is not hyperactive  Past Medical History:     Past Medical History:   Diagnosis Date    Cancer [facility])     prostate ca,had radiation    Cardiac disease     stents,then triple bypass    COPD (chronic obstructive pulmonary disease) ([place] [address] )     Diabetes mellitus ([place] [address] )     ETOH abuse     Hx of heart artery stent     2014    Hyperlipidemia     Hypertension     Prostate CA ([place] [address] )     Renal disorder     pyelonephritis    S/P CABG x 1     2004      Past Surgical History:     Past Surgical History:   Procedure Laterality Date    CORONARY ARTERY BYPASS GRAFT  2004    TONSILLECTOMY        Family History:     Family History   Problem Relation Age of Onset    Diabetes Mother     Uterine cancer Mother     COPD Father     Hypertension Father       Social History:     Social History     Socioeconomic History    Marital status: Single     Spouse name: Not on file    Number of children: Not on file    Years of education: Not on file    Highest education level: Not on file   Occupational History    Not on file   Social Needs    Financial resource strain: Not on file    Food insecurity:     Worry: Not on file     Inability: Not on file    Transportation needs:     Medical: Not on file     Non-medical: Not on file   Tobacco Use    Smoking status: Current Every Day Smoker     Packs/day: 1 50    Smokeless tobacco: Never Used   Substance and Sexual Activity    Alcohol use: Yes     Drinks per session: 1 or 2     Comment:  one pint of vodka a day      Drug use: No    Sexual activity: Not on file   Lifestyle    Physical activity:     Days per week: Not on file Minutes per session: Not on file    Stress: Not on file   Relationships    Social connections:     Talks on phone: Not on file     Gets together: Not on file     Attends Nondenominational service: Not on file     Active member of club or organization: Not on file     Attends meetings of clubs or organizations: Not on file     Relationship status: Not on file    Intimate partner violence:     Fear of current or ex partner: Not on file     Emotionally abused: Not on file     Physically abused: Not on file     Forced sexual activity: Not on file   Other Topics Concern    Not on file   Social History Narrative    Not on file      Current Medications:     Current Outpatient Medications   Medication Sig Dispense Refill    albuterol (2 5 mg/3 mL) 0 083 % nebulizer solution Take 1 vial (2 5 mg total) by nebulization every 6 (six) hours as needed for wheezing 75 mL 0    albuterol (PROVENTIL HFA,VENTOLIN HFA) 90 mcg/act inhaler Inhale 2 puffs every 4 (four) hours as needed for wheezing 1 Inhaler 0    amLODIPine (NORVASC) 5 mg tablet Take 5 mg by mouth daily      benzonatate (TESSALON PERLES) 100 mg capsule Take 1 capsule (100 mg total) by mouth 3 (three) times a day as needed for cough 20 capsule 0    clopidogrel (PLAVIX) 75 mg tablet Take 1 tablet (75 mg total) by mouth daily 30 tablet 0    famotidine (PEPCID) 20 mg tablet Take 20 mg by mouth 2 (two) times a day      fluticasone-salmeterol (ADVAIR) 500-50 mcg/dose Inhale 1 puff every 12 (twelve) hours      metFORMIN (GLUCOPHAGE) 1000 MG tablet Take 1,000 mg by mouth 2 (two) times a day with meals      metoprolol succinate (TOPROL-XL) 50 mg 24 hr tablet Take 1 5 tablets (75 mg total) by mouth daily 30 tablet 2    naltrexone (REVIA) 50 mg tablet Take 50 mg by mouth daily      ranolazine (RANEXA) 500 mg 12 hr tablet Take 1 tablet (500 mg total) by mouth 2 (two) times a day 60 tablet 0    rosuvastatin (CRESTOR) 20 MG tablet Take 1 tablet (20 mg total) by mouth daily 30 tablet 0    sertraline (ZOLOFT) 100 mg tablet Take 100 mg by mouth daily       TAMSULOSIN HCL PO Take 0 4 mg by mouth daily      tiotropium (SPIRIVA) 18 mcg inhalation capsule Place 18 mcg into inhaler and inhale daily      acetaminophen (TYLENOL) 325 mg tablet Take 2 tablets (650 mg total) by mouth every 4 (four) hours as needed for mild pain (Patient not taking: Reported on 7/22/2019) 30 tablet 0    al mag oxide-diphenhydramine-lidocaine viscous (MAGIC MOUTHWASH) 1:1:1 suspension Swish and swallow 10 mL every 4 (four) hours as needed for mouth pain or discomfort (Patient not taking: Reported on 7/22/2019) 180 mL 0    Blood Glucose Monitoring Suppl (ONE TOUCH ULTRA MINI) w/Device KIT Use as directed  0    chlordiazePOXIDE (LIBRIUM) 10 mg capsule Take 1 capsule (10 mg total) by mouth every 12 (twelve) hours for 4 doses 30 capsule 0    diclofenac sodium (VOLTAREN) 1 % Apply 2 g topically 4 (four) times a day for 30 doses 60 g 0    esomeprazole (NexIUM) 40 MG capsule   0    folic acid (FOLVITE) 1 mg tablet Take 1 tablet (1 mg total) by mouth daily 30 tablet 0    fosinopril (MONOPRIL) 10 mg tablet   0    lidocaine (LIDODERM) 5 % Apply 1 patch topically daily for 15 doses Remove & Discard patch within 12 hours or as directed by MD 15 patch 0    lidocaine (LMX) 4 % cream Apply topically as needed for mild pain (Patient not taking: Reported on 7/22/2019) 30 g 0    lidocaine (XYLOCAINE) 2 % topical gel Apply 1 application topically 3 (three) times a day for 10 days 30 mL 0    nicotine (NICODERM CQ) 21 mg/24 hr TD 24 hr patch Place 1 patch on the skin daily 28 patch 0    ONE TOUCH ULTRA TEST test strip 3 (three) times a day Test  0    ONETOUCH DELICA LANCETS FINE MISC 3 (three) times a day Test  0    thiamine 100 MG tablet Take 1 tablet (100 mg total) by mouth daily 30 tablet 0     No current facility-administered medications for this visit         Allergies:     No Known Allergies   Physical Exam:     BP 160/90 (BP Location: Left arm, Cuff Size: Large)   Pulse 96   Temp 98 4 °F (36 9 °C)   Ht 6' 2" (1 88 m)   Wt 91 2 kg (201 lb)   SpO2 100%   BMI 25 81 kg/m²     Physical Exam   Constitutional: He is oriented to person, place, and time  No distress  HENT:   Head: Normocephalic  Eyes: Pupils are equal, round, and reactive to light  EOM are normal    Cardiovascular: Normal rate and regular rhythm  Exam reveals no friction rub  No murmur heard  Pulmonary/Chest: Effort normal  He has wheezes  Abdominal: Soft  Musculoskeletal: Normal range of motion  Neurological: He is alert and oriented to person, place, and time  He displays tremor  Skin: Skin is warm  He is not diaphoretic  Psychiatric: He has a normal mood and affect         Lacey West MD  [address]

## 2019-07-22 NOTE — PROGRESS NOTES
Met with patient during PCP visit  Introduced self and role in managing care  Pt has all medications  Pt states " I forget to take them at night " Pt has visible tremors  States he drinks daily  Did not quantify how much he consumes  States he has had episodes when he has been sober, but has not in recent time  Information provided for alcohol counseling services near his home  Discussed the risk factors associated with alcoholism including falls and not taking medications correctly  CM contact information provided

## 2019-08-05 DIAGNOSIS — I25.10 CAD (CORONARY ARTERY DISEASE): ICD-10-CM

## 2019-08-05 RX ORDER — RANOLAZINE 500 MG/1
500 TABLET, EXTENDED RELEASE ORAL 2 TIMES DAILY
Qty: 60 TABLET | Refills: 0 | Status: SHIPPED | OUTPATIENT
Start: 2019-08-05 | End: 2019-10-16 | Stop reason: SDUPTHER

## 2019-08-05 NOTE — TELEPHONE ENCOUNTER
Qi Medina to contact pt for him to set up cardiologist, who should take over prescription of Ranexa  Refilled once as a courtesy to pt  Since does not have cardiologist at this time   Dr Heidy Gonzalez

## 2019-08-05 NOTE — TELEPHONE ENCOUNTER
I am not the original prescriber for this nor am I familiar with this medication  Patient has extensive cardiac history including CABG, CAD and stent placement    Please have him contact his cardiologist, concerning his cardiac medication regiment

## 2019-08-06 ENCOUNTER — PATIENT OUTREACH (OUTPATIENT)
Dept: FAMILY MEDICINE CLINIC | Facility: CLINIC | Age: 59
End: 2019-08-06

## 2019-08-06 NOTE — PROGRESS NOTES
CM attempted to return patients phone call  Recorded message that patient was unable to take and calls at this time

## 2019-10-16 DIAGNOSIS — I25.10 CAD (CORONARY ARTERY DISEASE): ICD-10-CM

## 2019-10-16 RX ORDER — RANOLAZINE 500 MG/1
500 TABLET, EXTENDED RELEASE ORAL 2 TIMES DAILY
Qty: 60 TABLET | Refills: 0 | Status: SHIPPED | OUTPATIENT
Start: 2019-10-16 | End: 2020-01-22 | Stop reason: SDUPTHER

## 2019-10-17 ENCOUNTER — APPOINTMENT (EMERGENCY)
Dept: RADIOLOGY | Facility: HOSPITAL | Age: 59
DRG: 683 | End: 2019-10-17
Payer: MEDICARE

## 2019-10-17 ENCOUNTER — HOSPITAL ENCOUNTER (INPATIENT)
Facility: HOSPITAL | Age: 59
LOS: 1 days | Discharge: HOME | DRG: 683 | End: 2019-10-19
Attending: EMERGENCY MEDICINE | Admitting: FAMILY MEDICINE
Payer: MEDICARE

## 2019-10-17 DIAGNOSIS — R53.1 WEAKNESS: ICD-10-CM

## 2019-10-17 DIAGNOSIS — I25.10 CAD (CORONARY ARTERY DISEASE): ICD-10-CM

## 2019-10-17 DIAGNOSIS — E87.1 HYPONATREMIA: ICD-10-CM

## 2019-10-17 DIAGNOSIS — S42.302A LEFT HUMERAL FRACTURE: Primary | ICD-10-CM

## 2019-10-17 DIAGNOSIS — E83.42 HYPOMAGNESEMIA: ICD-10-CM

## 2019-10-17 DIAGNOSIS — N17.9 ACUTE KIDNEY INJURY SUPERIMPOSED ON CKD (HCC): ICD-10-CM

## 2019-10-17 DIAGNOSIS — N18.9 ACUTE KIDNEY INJURY SUPERIMPOSED ON CKD (HCC): ICD-10-CM

## 2019-10-17 DIAGNOSIS — M48.02 CERVICAL SPINAL STENOSIS: ICD-10-CM

## 2019-10-17 DIAGNOSIS — N17.9 AKI (ACUTE KIDNEY INJURY) (HCC): ICD-10-CM

## 2019-10-17 PROBLEM — F10.10 ALCOHOL ABUSE: Status: ACTIVE | Noted: 2019-10-17

## 2019-10-17 LAB
AMMONIA PLAS-SCNC: <10 UMOL/L (ref 11–35)
ANION GAP SERPL CALCULATED.3IONS-SCNC: 15 MMOL/L (ref 4–13)
BASOPHILS # BLD AUTO: 0.05 THOUSANDS/ΜL (ref 0–0.1)
BASOPHILS NFR BLD AUTO: 1 % (ref 0–1)
BUN SERPL-MCNC: 69 MG/DL (ref 5–25)
CALCIUM SERPL-MCNC: 7.9 MG/DL (ref 8.3–10.1)
CHLORIDE SERPL-SCNC: 94 MMOL/L (ref 100–108)
CK MB SERPL-MCNC: 1.9 % (ref 0–2.5)
CK MB SERPL-MCNC: 10.5 NG/ML (ref 0–5)
CK SERPL-CCNC: 540 U/L (ref 39–308)
CO2 SERPL-SCNC: 21 MMOL/L (ref 21–32)
CREAT SERPL-MCNC: 3.13 MG/DL (ref 0.6–1.3)
EOSINOPHIL # BLD AUTO: 0.19 THOUSAND/ΜL (ref 0–0.61)
EOSINOPHIL NFR BLD AUTO: 3 % (ref 0–6)
ERYTHROCYTE [DISTWIDTH] IN BLOOD BY AUTOMATED COUNT: 13.4 % (ref 11.6–15.1)
ETHANOL SERPL-MCNC: <3 MG/DL (ref 0–3)
GFR SERPL CREATININE-BSD FRML MDRD: 21 ML/MIN/1.73SQ M
GLUCOSE SERPL-MCNC: 81 MG/DL (ref 65–140)
GLUCOSE SERPL-MCNC: 98 MG/DL (ref 65–140)
HCT VFR BLD AUTO: 38.5 % (ref 36.5–49.3)
HGB BLD-MCNC: 12.8 G/DL (ref 12–17)
IMM GRANULOCYTES # BLD AUTO: 0.03 THOUSAND/UL (ref 0–0.2)
IMM GRANULOCYTES NFR BLD AUTO: 1 % (ref 0–2)
LYMPHOCYTES # BLD AUTO: 1.25 THOUSANDS/ΜL (ref 0.6–4.47)
LYMPHOCYTES NFR BLD AUTO: 20 % (ref 14–44)
MAGNESIUM SERPL-MCNC: 1.5 MG/DL (ref 1.6–2.6)
MCH RBC QN AUTO: 33.1 PG (ref 26.8–34.3)
MCHC RBC AUTO-ENTMCNC: 33.2 G/DL (ref 31.4–37.4)
MCV RBC AUTO: 100 FL (ref 82–98)
MONOCYTES # BLD AUTO: 0.89 THOUSAND/ΜL (ref 0.17–1.22)
MONOCYTES NFR BLD AUTO: 14 % (ref 4–12)
NEUTROPHILS # BLD AUTO: 3.97 THOUSANDS/ΜL (ref 1.85–7.62)
NEUTS SEG NFR BLD AUTO: 61 % (ref 43–75)
NRBC BLD AUTO-RTO: 0 /100 WBCS
PLATELET # BLD AUTO: 124 THOUSANDS/UL (ref 149–390)
PMV BLD AUTO: 11.4 FL (ref 8.9–12.7)
POTASSIUM SERPL-SCNC: 4.1 MMOL/L (ref 3.5–5.3)
RBC # BLD AUTO: 3.87 MILLION/UL (ref 3.88–5.62)
SODIUM SERPL-SCNC: 130 MMOL/L (ref 136–145)
WBC # BLD AUTO: 6.38 THOUSAND/UL (ref 4.31–10.16)

## 2019-10-17 PROCEDURE — 82948 REAGENT STRIP/BLOOD GLUCOSE: CPT

## 2019-10-17 PROCEDURE — 72125 CT NECK SPINE W/O DYE: CPT

## 2019-10-17 PROCEDURE — 80048 BASIC METABOLIC PNL TOTAL CA: CPT | Performed by: EMERGENCY MEDICINE

## 2019-10-17 PROCEDURE — 83036 HEMOGLOBIN GLYCOSYLATED A1C: CPT | Performed by: GENERAL PRACTICE

## 2019-10-17 PROCEDURE — 73080 X-RAY EXAM OF ELBOW: CPT

## 2019-10-17 PROCEDURE — 83735 ASSAY OF MAGNESIUM: CPT | Performed by: EMERGENCY MEDICINE

## 2019-10-17 PROCEDURE — 82553 CREATINE MB FRACTION: CPT | Performed by: EMERGENCY MEDICINE

## 2019-10-17 PROCEDURE — 2W39X1Z IMMOBILIZATION OF LEFT UPPER EXTREMITY USING SPLINT: ICD-10-PCS | Performed by: ORTHOPAEDIC SURGERY

## 2019-10-17 PROCEDURE — 99285 EMERGENCY DEPT VISIT HI MDM: CPT

## 2019-10-17 PROCEDURE — 82272 OCCULT BLD FECES 1-3 TESTS: CPT | Performed by: FAMILY MEDICINE

## 2019-10-17 PROCEDURE — 90682 RIV4 VACC RECOMBINANT DNA IM: CPT | Performed by: STUDENT IN AN ORGANIZED HEALTH CARE EDUCATION/TRAINING PROGRAM

## 2019-10-17 PROCEDURE — 87081 CULTURE SCREEN ONLY: CPT | Performed by: STUDENT IN AN ORGANIZED HEALTH CARE EDUCATION/TRAINING PROGRAM

## 2019-10-17 PROCEDURE — 82140 ASSAY OF AMMONIA: CPT | Performed by: EMERGENCY MEDICINE

## 2019-10-17 PROCEDURE — 82550 ASSAY OF CK (CPK): CPT | Performed by: EMERGENCY MEDICINE

## 2019-10-17 PROCEDURE — 96365 THER/PROPH/DIAG IV INF INIT: CPT

## 2019-10-17 PROCEDURE — 94760 N-INVAS EAR/PLS OXIMETRY 1: CPT

## 2019-10-17 PROCEDURE — 96375 TX/PRO/DX INJ NEW DRUG ADDON: CPT

## 2019-10-17 PROCEDURE — G0008 ADMIN INFLUENZA VIRUS VAC: HCPCS | Performed by: STUDENT IN AN ORGANIZED HEALTH CARE EDUCATION/TRAINING PROGRAM

## 2019-10-17 PROCEDURE — 83918 ORGANIC ACIDS TOTAL QUANT: CPT | Performed by: STUDENT IN AN ORGANIZED HEALTH CARE EDUCATION/TRAINING PROGRAM

## 2019-10-17 PROCEDURE — 36415 COLL VENOUS BLD VENIPUNCTURE: CPT | Performed by: EMERGENCY MEDICINE

## 2019-10-17 PROCEDURE — 93005 ELECTROCARDIOGRAM TRACING: CPT

## 2019-10-17 PROCEDURE — NC001 PR NO CHARGE: Performed by: FAMILY MEDICINE

## 2019-10-17 PROCEDURE — 82746 ASSAY OF FOLIC ACID SERUM: CPT | Performed by: STUDENT IN AN ORGANIZED HEALTH CARE EDUCATION/TRAINING PROGRAM

## 2019-10-17 PROCEDURE — 82607 VITAMIN B-12: CPT | Performed by: STUDENT IN AN ORGANIZED HEALTH CARE EDUCATION/TRAINING PROGRAM

## 2019-10-17 PROCEDURE — 94762 N-INVAS EAR/PLS OXIMTRY CONT: CPT

## 2019-10-17 PROCEDURE — 96361 HYDRATE IV INFUSION ADD-ON: CPT

## 2019-10-17 PROCEDURE — 80320 DRUG SCREEN QUANTALCOHOLS: CPT | Performed by: STUDENT IN AN ORGANIZED HEALTH CARE EDUCATION/TRAINING PROGRAM

## 2019-10-17 PROCEDURE — 74176 CT ABD & PELVIS W/O CONTRAST: CPT

## 2019-10-17 PROCEDURE — 70450 CT HEAD/BRAIN W/O DYE: CPT

## 2019-10-17 PROCEDURE — 85025 COMPLETE CBC W/AUTO DIFF WBC: CPT | Performed by: EMERGENCY MEDICINE

## 2019-10-17 RX ORDER — FAMOTIDINE 20 MG/1
20 TABLET, FILM COATED ORAL 2 TIMES DAILY
Status: DISCONTINUED | OUTPATIENT
Start: 2019-10-17 | End: 2019-10-19 | Stop reason: HOSPADM

## 2019-10-17 RX ORDER — FOLIC ACID 1 MG/1
1 TABLET ORAL DAILY
Status: DISCONTINUED | OUTPATIENT
Start: 2019-10-18 | End: 2019-10-19 | Stop reason: HOSPADM

## 2019-10-17 RX ORDER — CLOPIDOGREL BISULFATE 75 MG/1
75 TABLET ORAL DAILY
Status: DISCONTINUED | OUTPATIENT
Start: 2019-10-18 | End: 2019-10-19 | Stop reason: HOSPADM

## 2019-10-17 RX ORDER — NICOTINE 21 MG/24HR
1 PATCH, TRANSDERMAL 24 HOURS TRANSDERMAL DAILY
Status: DISCONTINUED | OUTPATIENT
Start: 2019-10-18 | End: 2019-10-19 | Stop reason: HOSPADM

## 2019-10-17 RX ORDER — SERTRALINE HYDROCHLORIDE 100 MG/1
100 TABLET, FILM COATED ORAL DAILY
Status: DISCONTINUED | OUTPATIENT
Start: 2019-10-18 | End: 2019-10-19 | Stop reason: HOSPADM

## 2019-10-17 RX ORDER — METOPROLOL SUCCINATE 100 MG/1
100 TABLET, EXTENDED RELEASE ORAL DAILY
Status: DISCONTINUED | OUTPATIENT
Start: 2019-10-18 | End: 2019-10-19 | Stop reason: HOSPADM

## 2019-10-17 RX ORDER — TAMSULOSIN HYDROCHLORIDE 0.4 MG/1
0.4 CAPSULE ORAL DAILY
Status: DISCONTINUED | OUTPATIENT
Start: 2019-10-18 | End: 2019-10-19 | Stop reason: HOSPADM

## 2019-10-17 RX ORDER — AMLODIPINE BESYLATE 5 MG/1
5 TABLET ORAL DAILY
Status: DISCONTINUED | OUTPATIENT
Start: 2019-10-18 | End: 2019-10-19 | Stop reason: HOSPADM

## 2019-10-17 RX ORDER — ATORVASTATIN CALCIUM 40 MG/1
40 TABLET, FILM COATED ORAL
Status: DISCONTINUED | OUTPATIENT
Start: 2019-10-17 | End: 2019-10-19 | Stop reason: HOSPADM

## 2019-10-17 RX ORDER — IPRATROPIUM BROMIDE AND ALBUTEROL SULFATE 2.5; .5 MG/3ML; MG/3ML
3 SOLUTION RESPIRATORY (INHALATION) 4 TIMES DAILY PRN
Status: DISCONTINUED | OUTPATIENT
Start: 2019-10-17 | End: 2019-10-19 | Stop reason: HOSPADM

## 2019-10-17 RX ORDER — IPRATROPIUM BROMIDE AND ALBUTEROL SULFATE 2.5; .5 MG/3ML; MG/3ML
3 SOLUTION RESPIRATORY (INHALATION)
Status: DISCONTINUED | OUTPATIENT
Start: 2019-10-17 | End: 2019-10-17

## 2019-10-17 RX ORDER — SODIUM CHLORIDE 9 MG/ML
100 INJECTION, SOLUTION INTRAVENOUS CONTINUOUS
Status: DISCONTINUED | OUTPATIENT
Start: 2019-10-17 | End: 2019-10-19

## 2019-10-17 RX ORDER — MAGNESIUM SULFATE 1 G/100ML
1 INJECTION INTRAVENOUS ONCE
Status: COMPLETED | OUTPATIENT
Start: 2019-10-17 | End: 2019-10-17

## 2019-10-17 RX ORDER — RANOLAZINE 500 MG/1
500 TABLET, EXTENDED RELEASE ORAL 2 TIMES DAILY
Status: DISCONTINUED | OUTPATIENT
Start: 2019-10-17 | End: 2019-10-19 | Stop reason: HOSPADM

## 2019-10-17 RX ADMIN — MAGNESIUM SULFATE HEPTAHYDRATE 1 G: 1 INJECTION, SOLUTION INTRAVENOUS at 11:49

## 2019-10-17 RX ADMIN — THIAMINE HYDROCHLORIDE 100 MG: 100 INJECTION, SOLUTION INTRAMUSCULAR; INTRAVENOUS at 13:13

## 2019-10-17 RX ADMIN — SODIUM CHLORIDE 1000 ML: 0.9 INJECTION, SOLUTION INTRAVENOUS at 10:19

## 2019-10-17 RX ADMIN — SODIUM CHLORIDE 100 ML/HR: 0.9 INJECTION, SOLUTION INTRAVENOUS at 18:17

## 2019-10-17 RX ADMIN — FOLIC ACID 1 MG: 5 INJECTION, SOLUTION INTRAMUSCULAR; INTRAVENOUS; SUBCUTANEOUS at 13:52

## 2019-10-17 RX ADMIN — INFLUENZA A VIRUS A/BRISBANE/02/2018 (H1N1) RECOMBINANT HEMAGGLUTININ ANTIGEN, INFLUENZA A VIRUS A/KANSAS/14/2017 (H3N2) RECOMBINANT HEMAGGLUTININ ANTIGEN, INFLUENZA B VIRUS B/PHUKET/3073/2013 RECOMBINANT HEMAGGLUTININ ANTIGEN, AND INFLUENZA B VIRUS B/MARYLAND/15/2016 RECOMBINANT HEMAGGLUTININ ANTIGEN 0.5 ML: 45; 45; 45; 45 INJECTION INTRAMUSCULAR at 18:19

## 2019-10-17 RX ADMIN — SODIUM CHLORIDE 1000 ML: 0.9 INJECTION, SOLUTION INTRAVENOUS at 12:39

## 2019-10-17 RX ADMIN — ATORVASTATIN CALCIUM 40 MG: 40 TABLET, FILM COATED ORAL at 18:19

## 2019-10-17 RX ADMIN — RANOLAZINE 500 MG: 500 TABLET, FILM COATED, EXTENDED RELEASE ORAL at 18:19

## 2019-10-17 RX ADMIN — FAMOTIDINE 20 MG: 20 TABLET ORAL at 18:19

## 2019-10-17 NOTE — ASSESSMENT & PLAN NOTE
BP on admission is 119/59  Stable  Continue with home medication Norvasc 5 mg tablet p o  Q d  Monopril 10 mg p o  Q d  Metoprolol succinate 100 mg p o  Q d    Monitor BP

## 2019-10-17 NOTE — PLAN OF CARE
Problem: PAIN - ADULT  Goal: Verbalizes/displays adequate comfort level or baseline comfort level  Description  Interventions:  - Encourage patient to monitor pain and request assistance  - Assess pain using appropriate pain scale  - Administer analgesics based on type and severity of pain and evaluate response  - Implement non-pharmacological measures as appropriate and evaluate response  - Consider cultural and social influences on pain and pain management  - Notify physician/advanced practitioner if interventions unsuccessful or patient reports new pain  Outcome: Progressing     Problem: SAFETY ADULT  Goal: Patient will remain free of falls  Description  INTERVENTIONS:  - Assess patient frequently for physical needs  -  Identify cognitive and physical deficits and behaviors that affect risk of falls    -  [place] fall precautions as indicated by assessment   - Educate patient/family on patient safety including physical limitations  - Instruct patient to call for assistance with activity based on assessment  - Modify environment to reduce risk of injury  - Consider OT/PT consult to assist with strengthening/mobility  Outcome: Progressing  Goal: Maintain or return to baseline ADL function  Description  INTERVENTIONS:  -  Assess patient's ability to carry out ADLs; assess patient's baseline for ADL function and identify physical deficits which impact ability to perform ADLs (bathing, care of mouth/teeth, toileting, grooming, dressing, etc )  - Assess/evaluate cause of self-care deficits   - Assess range of motion  - Assess patient's mobility; develop plan if impaired  - Assess patient's need for assistive devices and provide as appropriate  - Encourage maximum independence but intervene and supervise when necessary  - Involve family in performance of ADLs  - Assess for home care needs following discharge   - Consider OT consult to assist with ADL evaluation and planning for discharge  - Provide patient education as appropriate  Outcome: Progressing  Goal: Maintain or return mobility status to optimal level  Description  INTERVENTIONS:  - Assess patient's baseline mobility status (ambulation, transfers, stairs, etc )    - Identify cognitive and physical deficits and behaviors that affect mobility  - Identify mobility aids required to assist with transfers and/or ambulation (gait belt, sit-to-stand, lift, walker, cane, etc )  - [place] fall precautions as indicated by assessment  - Record patient progress and toleration of activity level on Mobility SBAR; progress patient to next Phase/Stage  - Instruct patient to call for assistance with activity based on assessment  - Consider rehabilitation consult to assist with strengthening/weightbearing, etc   Outcome: Progressing     Problem: DISCHARGE PLANNING  Goal: Discharge to home or other facility with appropriate resources  Description  INTERVENTIONS:  - Identify barriers to discharge w/patient and caregiver  - Arrange for needed discharge resources and transportation as appropriate  - Identify discharge learning needs (meds, wound care, etc )  - Arrange for interpretive services to assist at discharge as needed  - Refer to Case Management Department for coordinating discharge planning if the patient needs post-hospital services based on physician/advanced practitioner order or complex needs related to functional status, cognitive ability, or social support system  Outcome: Progressing     Problem: Knowledge Deficit  Goal: Patient/family/caregiver demonstrates understanding of disease process, treatment plan, medications, and discharge instructions  Description  Complete learning assessment and assess knowledge base    Interventions:  - Provide teaching at level of understanding  - Provide teaching via preferred learning methods  Outcome: Progressing

## 2019-10-17 NOTE — ED PROVIDER NOTES
History  Chief Complaint   Patient presents with    Weakness - Generalized     Pt c/o generalized weakness for months- getting worse  Assoc  with feeling lightheaded and dizzy, shaky  [place] yesterday- sts just got out of bed and fell  Has been trying to cut back on his drinking because he thought that was causing his problem  Did drink this am   Pain back of neck from fall  Collar applied  HPI    This is a 64 year male that presents today with generalized weakness  Patient states for the past several months he has been getting very weak  States he gets lightheaded and dizzy  Whenever he gets up suddenly it gets worse  Patient states he fell yesterday he got out of bed and fell  He states he has neck pain along with elbow pain  Patient states he is trying to cut down on alcohol because he thinks that the problem  States he drinks about a pint of vodka daily  States shaky and cramps all over his legs and body  States last time he had alcohol was this morning  States the worsening that is happen with alcohol withdrawals shannon  64 year male that presents with generalized weakness and shaking  Will do blood work to find another source of his symptoms  Will do an ammonia and Mag level  Will get some images as necessary  Prior to Admission Medications   Prescriptions Last Dose Informant Patient Reported? Taking?    Blood Glucose Monitoring Suppl (ONE TOUCH ULTRA MINI) w/Device KIT More than a month at Unknown time  Yes No   Sig: Use as directed   ONE TOUCH ULTRA TEST test strip More than a month at Unknown time  Yes No   Sig: 3 (three) times a day Test   [facility] LANCETS FINE MISC More than a month at Unknown time  Yes No   Sig: 3 (three) times a day Test   TAMSULOSIN HCL PO 10/17/2019 at Unknown time Self Yes Yes   Sig: Take 0 4 mg by mouth daily   albuterol (PROVENTIL HFA,VENTOLIN HFA) 90 mcg/act inhaler Unknown at Unknown time  No No   Sig: Inhale 2 puffs every 4 (four) hours as needed for wheezing   amLODIPine (NORVASC) 5 mg tablet 10/17/2019 at Unknown time  Yes Yes   Sig: Take 5 mg by mouth daily   clopidogrel (PLAVIX) 75 mg tablet 10/17/2019 at Unknown time  No Yes   Sig: Take 1 tablet (75 mg total) by mouth daily   famotidine (PEPCID) 20 mg tablet 10/17/2019 at Unknown time  Yes Yes   Sig: Take 20 mg by mouth 2 (two) times a day   fluticasone-salmeterol (ADVAIR) 500-50 mcg/dose 10/17/2019 at Unknown time  Yes Yes   Sig: Inhale 1 puff every 12 (twelve) hours   folic acid (FOLVITE) 1 mg tablet 10/17/2019 at Unknown time  No Yes   Sig: Take 1 tablet (1 mg total) by mouth daily   fosinopril (MONOPRIL) 10 mg tablet 10/17/2019 at Unknown time  Yes Yes   Sig: Take 10 mg by mouth daily    metFORMIN (GLUCOPHAGE) 1000 MG tablet 10/17/2019 at Unknown time  Yes Yes   Sig: Take 1,000 mg by mouth 2 (two) times a day with meals   metoprolol succinate (TOPROL-XL) 50 mg 24 hr tablet 10/17/2019 at Unknown time  No Yes   Sig: Take 1 5 tablets (75 mg total) by mouth daily   Patient taking differently: Take 100 mg by mouth daily    ranolazine (RANEXA) 500 mg 12 hr tablet 10/17/2019 at Unknown time  No Yes   Sig: Take 1 tablet (500 mg total) by mouth 2 (two) times a day   rosuvastatin (CRESTOR) 20 MG tablet 10/17/2019 at Unknown time  No Yes   Sig: Take 1 tablet (20 mg total) by mouth daily   sertraline (ZOLOFT) 100 mg tablet 10/17/2019 at Unknown time  Yes Yes   Sig: Take 100 mg by mouth daily    tiotropium (SPIRIVA) 18 mcg inhalation capsule Unknown at Unknown time Self Yes No   Sig: Place 18 mcg into inhaler and inhale daily      Facility-Administered Medications: None       Past Medical History:   Diagnosis Date    Cancer [facility])     prostate ca,had radiation    Cardiac disease     stents,then triple bypass    [facility]     [facility] mellitus [facility]     ETOH abuse     Hx of heart artery stent     2014    Hyperlipidemia     Hypertension     [facility]state CA ([facility] )     Renal disorder     pyelonephritis    S/P CABG x 1     2004       Past Surgical History:   Procedure Laterality Date    CORONARY ARTERY BYPASS GRAFT  2004    TONSILLECTOMY         Family History   Problem Relation Age of Onset    Diabetes Mother     Uterine cancer Mother     COPD Father     Hypertension Father      I have reviewed and agree with the history as documented  Social History     Tobacco Use    Smoking status: Current Every Day Smoker     Packs/day: 1 50     Years: 40 00     Pack years: 60 00    Smokeless tobacco: Never Used   Substance Use Topics    Alcohol use: Yes     Frequency: 4 or more times a week     Drinks per session: 10 or more     Comment: 1/5 of vodka per day    Drug use: No        Review of Systems   Constitutional: Negative for diaphoresis and fever  HENT: Negative for sneezing and sore throat  Eyes: Negative for photophobia and visual disturbance  Respiratory: Negative for cough, shortness of breath and wheezing  Cardiovascular: Negative for chest pain and palpitations  Gastrointestinal: Negative for abdominal pain, constipation, diarrhea, nausea and vomiting  Genitourinary: Negative for frequency, hematuria and urgency  Musculoskeletal: Positive for neck pain  Negative for back pain  Neurological: Positive for tremors and weakness  Negative for dizziness  Physical Exam  Physical Exam   Constitutional: He is oriented to person, place, and time  He appears well-developed and well-nourished  No distress  HENT:   Head: Normocephalic and atraumatic  Nose: Nose normal    Mouth/Throat: Oropharynx is clear and moist    Eyes: Pupils are equal, round, and reactive to light  Conjunctivae and EOM are normal    Neck: Neck supple  C-collar applied some tenderness with palpation of his neck   Cardiovascular: Normal rate, regular rhythm and normal heart sounds  No murmur heard  Pulmonary/Chest: Effort normal and breath sounds normal  No respiratory distress  He has no wheezes  He has no rales  Abdominal: Soft  Bowel sounds are normal  He exhibits no distension  There is no tenderness  There is no rebound and no guarding  Musculoskeletal: Normal range of motion  He exhibits no edema, tenderness or deformity  Left elbow mild tenderness normal flexion-extension 2+ radial pulses   Neurological: He is alert and oriented to person, place, and time  No cranial nerve deficit  Mild tremor in his hands   Skin: Skin is warm and dry  No rash noted  He is not diaphoretic  No pallor  Psychiatric: He has a normal mood and affect  Vitals reviewed        Vital Signs  ED Triage Vitals   Temperature Pulse Respirations Blood Pressure SpO2   10/17/19 1000 10/17/19 1000 10/17/19 1000 10/17/19 1000 10/17/19 1000   98 °F (36 7 °C) 60 16 97/53 92 %      Temp Source Heart Rate Source Patient Position - Orthostatic VS BP Location FiO2 (%)   10/17/19 1143 10/17/19 1143 10/17/19 1143 10/17/19 1143 --   Tympanic Monitor Lying Right arm       Pain Score       10/17/19 1000       3           Vitals:    10/17/19 1352 10/17/19 1400 10/17/19 1435 10/17/19 1617   BP: 143/64 143/64 119/59 94/53   Pulse: 70 72 69 67   Patient Position - Orthostatic VS: Lying  Lying          Visual Acuity      ED Medications  Medications   influenza vaccine, recombinant, quadrivalent (FLUBLOK) IM injection 0 5 mL (has no administration in time range)   sodium chloride 0 9 % bolus 1,000 mL (0 mL Intravenous Stopped 10/17/19 1150)   magnesium sulfate IVPB (premix) SOLN 1 g (0 g Intravenous Stopped 10/17/19 1313)   thiamine (VITAMIN B1) 100 mg in sodium chloride 0 9 % 50 mL IVPB (0 mg Intravenous Stopped 61/61/06 4521)   folic acid 1 mg in sodium chloride 0 9 % 50 mL IVPB (1 mg Intravenous New Bag 10/17/19 1352)   sodium chloride 0 9 % bolus 1,000 mL (1,000 mL Intravenous New Bag 10/17/19 1239)       Diagnostic Studies  Results Reviewed     Procedure Component Value Units Date/Time    CKMB [[id number]]  (Abnormal) Collected:  10/17/19 1016    Lab Status:  Final result Specimen:  Blood from Arm, Left Updated:  10/17/19 1157     CK-MB Index 1 9 %      CK-MB 10 5 ng/mL     Basic metabolic panel [[id number]]  (Abnormal) Collected:  10/17/19 1016    Lab Status:  Final result Specimen:  Blood from Arm, Left Updated:  10/17/19 1122     Sodium 130 mmol/L      Potassium 4 1 mmol/L      Chloride 94 mmol/L      CO2 21 mmol/L      ANION GAP 15 mmol/L      BUN 69 mg/dL      Creatinine 3 13 mg/dL      Glucose 98 mg/dL      Calcium 7 9 mg/dL      eGFR 21 ml/min/1 73sq m     Narrative:       [place] guidelines for Chronic Kidney Disease (CKD):     Stage 1 with normal or high GFR (GFR > 90 mL/min/1 73 square meters)    Stage 2 Mild CKD (GFR = 60-89 mL/min/1 73 square meters)    Stage 3A Moderate CKD (GFR = 45-59 mL/min/1 73 square meters)    Stage 3B Moderate CKD (GFR = 30-44 mL/min/1 73 square meters)    Stage 4 Severe CKD (GFR = 15-29 mL/min/1 73 square meters)    Stage 5 End Stage CKD (GFR <15 mL/min/1 73 square meters)  Note: GFR calculation is accurate only with a steady state creatinine    CK Total with Reflex CKMB [[id number]]  (Abnormal) Collected:  10/17/19 1016    Lab Status:  Final result Specimen:  Blood from Arm, Left Updated:  10/17/19 1121     Total  U/L     Ammonia [[id number]]  (Abnormal) Collected:  10/17/19 1016    Lab Status:  Final result Specimen:  Blood from Arm, Left Updated:  10/17/19 1050     Ammonia <10 umol/L     Magnesium [[id number]]  (Abnormal) Collected:  10/17/19 1016    Lab Status:  Final result Specimen:  Blood from Arm, Left Updated:  10/17/19 1050     Magnesium 1 5 mg/dL     CBC and differential [[id number]]  (Abnormal) Collected:  10/17/19 1016    Lab Status:  Final result Specimen:  Blood from Arm, Left Updated:  10/17/19 1031     WBC 6 38 Thousand/uL      RBC 3 87 Million/uL      Hemoglobin 12 8 g/dL      Hematocrit 38 5 %       fL      MCH 33 1 pg      MCHC 33 2 g/dL      RDW 13 4 %      MPV 11 4 fL      Platelets 601 Thousands/uL      nRBC 0 /100 WBCs      Neutrophils Relative 61 %      Immat GRANS % 1 %      Lymphocytes Relative 20 %      Monocytes Relative 14 %      Eosinophils Relative 3 %      Basophils Relative 1 %      Neutrophils Absolute 3 97 Thousands/µL      Immature Grans Absolute 0 03 Thousand/uL      Lymphocytes Absolute 1 25 Thousands/µL      Monocytes Absolute 0 89 Thousand/µL      Eosinophils Absolute 0 19 Thousand/µL      Basophils Absolute 0 05 Thousands/µL     Narrative: This is an appended report  These results have been appended to a previously verified report  CT abdomen pelvis wo contrast   Final Result by Joie Brooks MD (10/17 4882)      No radiopaque urinary tract calculi or obstructive uropathy  No acute intra-abdominal or intrapelvic process insofar as can be detected on a noncontrast CT  Colonic diverticulosis  Hepatic steatosis  Small bilateral adrenal nodules  Its imaging features are diagnostic of benign adrenal adenoma, and does not need further followup or workup  If there are clinical signs or symptoms of adrenal hyperfunction, biochemical evaluation may be appropriate  Adrenal recommendation based on institutional consensus and Journal of [employer] of Radiology 2017;14:1610-4418      Moderate to severe degenerative changes in the lumbar spine  Workstation performed: [id number]         CT head without contrast   Final Result by Dez Wright MD (10/17 1059)      No acute intracranial abnormality  Mild cerebral chronic microangiopathic disease  Mild inflammatory paranasal sinus disease  Workstation performed: [id number]         CT cervical spine without contrast   Final Result by Dez Wright MD (10/17 1109)      No cervical spine fracture or traumatic malalignment        Scoliosis, spondylolisthesis and multilevel degenerative disease as described above  Moderate spinal stenosis at C4-C5  Workstation performed: [id number]         XR elbow 3+ views LEFT   Final Result by Yakelin Collier MD (10/17 1256)      Nondisplaced cortical fracture distal humerus along its dorsal surface  Workstation performed: [id number]                    Procedures  Procedures       ED Course  ED Course as of Oct 17 1656   Thu Oct 17, 2019   1030 Procedure Note: EKG  Date/Time: 10/17/19 10:30 AM   Performed by: Luann Perez by: Ria Veras   Indications / Diagnosis: weakness/shaky  ECG reviewed by me, the ED Provider: yes   The EKG demonstrates:  Rhythm: normal sinus  Intervals: normal intervals  Axis: normal axis  QRS/Blocks:normal QRS  ST Changes: No acute ST Changes, no STD/KAREN         [address] recommended floors  Blood sugar improving currently eating  Change in insulin regimen from 24 units to 30 units and did not have breakfast today                                   MDM    Disposition  Final diagnoses:   Weakness   ENMA (acute kidney injury) ([facility] )   Hypomagnesemia   Hyponatremia   Left humeral fracture     Time reflects when diagnosis was documented in both MDM as applicable and the Disposition within this note     Time User Action Codes Description Comment    10/17/2019  1:21 PM [address]  8  [R53 1] Weakness     10/17/2019  1:21 PM Skylar [address]  [N17 9] ENMA [facility]acute kidney injury) ([facility]     10/17/2019  1:21 PM Skylar Russell [address]  [E83 42] Hypomagnesemia     10/17/2019  1:21 PM Skylar [address]  8  [E87 1] Hyponatremia     10/17/2019  1:21 PM Ilene Velásquez, [address] Left humeral fracture       ED Disposition     ED Disposition Condition Date/Time Comment    Admit Stable Thu Oct 17, 2019  1:21 PM Case was discussed with medicine and the patient's admission status was agreed to be Admission Status: observation status to the service of Dr Sola Lockwood           Follow-up Information    None         Current Discharge Medication List      CONTINUE these medications which have NOT CHANGED    Details   amLODIPine (NORVASC) 5 mg tablet Take 5 mg by mouth daily      clopidogrel (PLAVIX) 75 mg tablet Take 1 tablet (75 mg total) by mouth daily  Qty: 30 tablet, Refills: 0    Associated Diagnoses: CAD (coronary artery disease)      famotidine (PEPCID) 20 mg tablet Take 20 mg by mouth 2 (two) times a day      fluticasone-salmeterol (ADVAIR) 500-50 mcg/dose Inhale 1 puff every 12 (twelve) hours      folic acid (FOLVITE) 1 mg tablet Take 1 tablet (1 mg total) by mouth daily  Qty: 30 tablet, Refills: 0    Associated Diagnoses: Alcohol intoxication ([facility])      fosinopril (MONOPRIL) 10 mg tablet Take 10 mg by mouth daily   Refills: 0      metFORMIN (GLUCOPHAGE) 1000 MG tablet Take 1,000 mg by mouth 2 (two) times a day with meals      metoprolol succinate (TOPROL-XL) 50 mg 24 hr tablet Take 1 5 tablets (75 mg total) by mouth daily  Qty: 30 tablet, Refills: 2    Associated Diagnoses: Essential hypertension      ranolazine (RANEXA) 500 mg 12 hr tablet Take 1 tablet (500 mg total) by mouth 2 (two) times a day  Qty: 60 tablet, Refills: 0    Associated Diagnoses: CAD (coronary artery disease)      rosuvastatin (CRESTOR) 20 MG tablet Take 1 tablet (20 mg total) by mouth daily  Qty: 30 tablet, Refills: 0    Associated Diagnoses: HLD (hyperlipidemia)      sertraline (ZOLOFT) 100 mg tablet Take 100 mg by mouth daily       TAMSULOSIN HCL PO Take 0 4 mg by mouth daily      albuterol (PROVENTIL HFA,VENTOLIN HFA) 90 mcg/act inhaler Inhale 2 puffs every 4 (four) hours as needed for wheezing  Qty: 1 Inhaler, Refills: 0    Associated Diagnoses: COPD (chronic obstructive pulmonary disease) ([facility])      Blood Glucose Monitoring Suppl (ONE TOUCH ULTRA MINI) w/Device KIT Use as directed  Refills: 0      ONE TOUCH ULTRA TEST test strip 3 (three) times a day Test  Refills: 0      ONETOUCH DELICA LANCETS FINE MISC 3 (three) times a day Test  Refills: 0      tiotropium (SPIRIVA) 18 mcg inhalation capsule Place 18 mcg into inhaler and inhale daily           No discharge procedures on file      ED Provider  Electronically Signed by           Aleksey Smith MD  10/17/19 2190

## 2019-10-17 NOTE — ASSESSMENT & PLAN NOTE
Patient admits to generalized weakness for the past few months in his limbs  Patient fell by his bed yesterday with no sustained injuries  - CT head: No acute intracranial abnormality  Mild cerebral chronic microangiopathic disease  Mild inflammatory paranasal sinus disease  -CT abdomen: No radiopaque urinary tract calculi or obstructive uropathy  No acute intra-abdominal or intrapelvic process insofar as can be detected on a noncontrast CT  Colonic diverticulosis  Hepatic steatosis  Small bilateral adrenal nodules  Its imaging features are diagnostic of benign adrenal adenoma, and does not need further followup or workup  If there are clinical signs or symptoms of adrenal hyperfunction, biochemical evaluation may be appropriate   Moderate to severe degenerative changes in the lumbar spine   - Magnesium 1 5 on admission likely 2/2 to patient's alcohol abuse  - Nephrology consulted, stated it is safe to replenish Mg 2+  - Patient given magnesium sulfate 2 g/50 mL IVPB (premix) supplementation

## 2019-10-17 NOTE — ASSESSMENT & PLAN NOTE
CT abdomen and pelvis: Small bilateral adrenal nodules  Its imaging features are diagnostic of benign adrenal adenoma, and does not need further followup or workup   If there are clinical signs or symptoms of adrenal hyperfunction, biochemical evaluation may be appropriate      - Na+ on admission 130 improved to 140 today  - Received normal saline 100 cc/hour

## 2019-10-17 NOTE — ASSESSMENT & PLAN NOTE
X-ray left elbow: Nondisplaced cortical fracture distal humerus along its dorsal surface   - Ortho consulted and will treat conservatively with immobilization and re-evaluation 1 week  A posterior splint was applied to his left elbow to keep him in neutral position and a sling was applied  He should keep the sling and splint in place for 1 week until he is re-evaluated  He should be nonweightbearing in the left upper extremity

## 2019-10-17 NOTE — ASSESSMENT & PLAN NOTE
Lab Results   Component Value Date    HGBA1C 5 4 06/07/2019       No results for input(s): POCGLU in the last 72 hours      Blood Sugar Average: Last 72 hrs:     -POC glucose on admission 98  -ISS

## 2019-10-17 NOTE — ASSESSMENT & PLAN NOTE
- Continue home medication amlodipine 5 mg p o  Q d , Plavix 75 mg p o  Q d , metoprolol succinate 100 mg p o  Q d , ranolazine 500 mg p o  B i d   -f/u EKG showed sinus bradycardia and possible left atrial enlargement

## 2019-10-17 NOTE — ASSESSMENT & PLAN NOTE
- Folate and B12 WNL  - Patient received thiamine, B12 and folate supplements  - EtOH level <3 at 10/17/19 20:19  - continue CIWA protocol  - CMP showed AST/ALT WNL  - fall precaution  - patient Education on drinking cessation

## 2019-10-17 NOTE — ASSESSMENT & PLAN NOTE
-DuoNeb p r n  Q 4h, DuoNeb q 6 resp  -Continue with Sonya Fontanata in place of home Spiriva  -O2 p r n

## 2019-10-17 NOTE — H&P
H&P Exam - Jon Carrillo 64 y o  male MRN: [id number]    Unit/Bed#: [address] [place] [address] Encounter: [id number]     Patient is a 28-year-old male with past medical history of chronic alcohol abuse, COPD, hyperlipidemia, hypertension, diabetes and depression presenting with generalized weakness  Patient was admitted for observation under the care of Dr Carlie Portillo  Patient's expected length of stay is less than 2 midnights  Assessment/Plan     Weakness  Assessment & Plan  Patient admits to generalized weakness for the past few months in his limbs  Patient fell by his bed yesterday with no sustained injuries  - CT head: No acute intracranial abnormality  Mild cerebral chronic microangiopathic disease  Mild inflammatory paranasal sinus disease  -CT abdomen: No radiopaque urinary tract calculi or obstructive uropathy  No acute intra-abdominal or intrapelvic process insofar as can be detected on a noncontrast CT  Colonic diverticulosis  Hepatic steatosis  Small bilateral adrenal nodules  Its imaging features are diagnostic of benign adrenal adenoma, and does not need further followup or workup  If there are clinical signs or symptoms of adrenal hyperfunction, biochemical evaluation may be appropriate  Moderate to severe degenerative changes in the lumbar spine   - Magnesium 1 5 on admission likely 2/2 to patient's alcohol abuse  - replenish Mg 2+        * Acute kidney injury superimposed on CKD ([facility] [place] [address] )  Assessment & Plan  BUN/Cr on admission: 69/3 13 (baseline Cr 1 0); BP on admission 97/55  Patient had a previous admission in June 7, 2019 with hypotension and ENMA  - CK MB fraction 10 5  - Total   - IVF 100cc/hr  - trend BMP  -follow urine microscopic  -renal consult    Left humeral fracture  Assessment & Plan  X-ray left elbow: Nondisplaced cortical fracture distal humerus along its dorsal surface    Patient currently in a sling  -ortho consult    Hyponatremia  Assessment & Plan  CT abdomen and pelvis: Small bilateral adrenal nodules  Its imaging features are diagnostic of benign adrenal adenoma, and does not need further followup or workup  If there are clinical signs or symptoms of adrenal hyperfunction, biochemical evaluation may be appropriate      -Na+ on admission 130  -Normal saline 100 cc/hour  -Follow-up sodium    Alcohol abuse  Assessment & Plan  -Follow-up ETOH level  -B12, folate  -initiate [facility] protocol  -follow-up CMP  -fall precaution  -patient Education on drinking cessation    Nicotine abuse  Assessment & Plan  Nicotine patch  Patient education on smoking cessation    CAD (coronary artery disease)  Assessment & Plan  - Continue home medication amlodipine 5 mg p o  Q d , Plavix 75 mg p o  Q d , metoprolol succinate 100 mg p o  Q d , ranolazine 500 mg p o  B i d   -f/u EKG    History of prostate cancer  Assessment & Plan  Status post resection and radiation on remission  HLD (hyperlipidemia)  Assessment & Plan  HDL 59, , TG 75  Stable  Hold Crestor 20 mg p o  Q d  given patient's current ENMA    Hx of CABG  Assessment & Plan      History of CABG in 2004  Stent in 2014  Patient's cardiologist from [place], [place]  Depression  Assessment & Plan  Continue with Zoloft 100 mg tablet p o  Q d  Hypomagnesemia  Assessment & Plan  -Magnesium 1 5 on admission  -Follow-up magnesium   -Will not replenish as of now given ENMA    Essential (primary) hypertension  Assessment & Plan  BP on admission is 119/59  Stable  Continue with home medication Norvasc 5 mg tablet p o  Q d  Monopril 10 mg p o  Q d  Metoprolol succinate 100 mg p o  Q d  Monitor BP    Type 2 diabetes mellitus ([facility] [address] )  Assessment & Plan  Lab Results   Component Value Date    HGBA1C 5 4 06/07/2019       No results for input(s): POCGLU in the last 72 hours      Blood Sugar Average: Last 72 hrs:     -POC glucose on admission 98  -ISS    COPD (chronic obstructive pulmonary disease) (HCC)  Assessment & Plan  -DuoNeb p r n  Q 4h, DuoNeb q 6 resp  -Continue with Sonya Rodriguez in place of home Spiriva  -O2 p r n  Assessment and plan discussed with the attending physician on call  ED course:  Sodium chloride 1 L bolus x2, magnesium sulfate 1 g x1, thiamine 135 mg x1, folic acid 1 mg x1  FEN:     History of Present Illness     HPI:  Fidelina Gonzalez is a 64 y o  male with history of HTN, CAD, T2DM, COPD, EtOH abuse, tobacco abuse, and prostate cancer in remission who presents with tremors and b/l leg cramping  Patient states that yesterday morning he woke up and fell out of his bed, injuring his neck and left elbow  He denies any prodromal symptoms including lightheadedness or vertigo  He came to the ED this morning with increasing tremors and muscle cramping in his legs and stated that whenever he doesn't drink he "gets shaky in his arms and legs"  The muscle cramping is a chronic issue that is mostly unchanged from baseline per the patient  He denies any history of withdrawal seizures  His last drink was this morning, one vodka & tea  His substance use history is significant for drinking a "5th of vodka" everyday and 1 PPD cigarettes for the past 40 years  He denies any illicit drug use  He denies any headache, vision changes, CP, SOB, abdominal pain  Patient had a similar presentation on 6/7/2019 where he was admitted for syncope and was found to be hypotensive with an ENMA  PCP: Gini Markham MD    Review of Systems   Constitutional: Negative for chills  Eyes: Negative for visual disturbance  Respiratory: Negative for cough, shortness of breath and wheezing  Cardiovascular: Negative for chest pain and palpitations  Gastrointestinal: Positive for diarrhea  Negative for abdominal pain, blood in stool, constipation, nausea and vomiting  Diarrhea for the past 2 days   Genitourinary: Positive for flank pain  Negative for decreased urine volume, dysuria, hematuria and urgency     Musculoskeletal: Positive for back pain, myalgias and neck pain  Skin: Negative for rash  Neurological: Positive for tremors, weakness and light-headedness  Historical Information   Past Medical History:   Diagnosis Date    Cancer [facility])     prostate ca,had radiation    Cardiac disease     stents,then triple bypass    COPD (chronic obstructive pulmonary disease) ([place] [address] )     Diabetes mellitus ([place] [address] )     ETOH abuse     Hx of heart artery stent     2014    Hyperlipidemia     Hypertension     Prostate CA ([place] [address] )     Renal disorder     pyelonephritis    S/P CABG x 1     2004     Past Surgical History:   Procedure Laterality Date    CORONARY ARTERY BYPASS GRAFT  2004    TONSILLECTOMY       Social History   Social History     Substance and Sexual Activity   Alcohol Use Yes    Frequency: 4 or more times a week    Drinks per session: 10 or more    Comment: 1/5 of vodka per day     Social History     Substance and Sexual Activity   Drug Use No     Social History     Tobacco Use   Smoking Status Current Every Day Smoker    Packs/day: 1 50    Years: 40 00    Pack years: 60 00   Smokeless Tobacco Never Used     Family History:   Family History   Problem Relation Age of Onset    Diabetes Mother     Uterine cancer Mother     COPD Father     Hypertension Father        Meds/Allergies   PTA meds:   Prior to Admission Medications   Prescriptions Last Dose Informant Patient Reported? Taking?    Blood Glucose Monitoring Suppl (ONE TOUCH ULTRA MINI) w/Device KIT More than a month at Unknown time  Yes No   Sig: Use as directed   ONE TOUCH ULTRA TEST test strip More than a month at Unknown time  Yes No   Sig: 3 (three) times a day Test   [facility] LANCETS FINE MISC More than a month at Unknown time  Yes No   Sig: 3 (three) times a day Test   TAMSULOSIN HCL PO 10/17/2019 at Unknown time Self Yes Yes   Sig: Take 0 4 mg by mouth daily   albuterol (PROVENTIL HFA,VENTOLIN HFA) 90 mcg/act inhaler Unknown at Unknown time  No No   Sig: Inhale 2 puffs every 4 (four) hours as needed for wheezing   amLODIPine (NORVASC) 5 mg tablet 10/17/2019 at Unknown time  Yes Yes   Sig: Take 5 mg by mouth daily   clopidogrel (PLAVIX) 75 mg tablet 10/17/2019 at Unknown time  No Yes   Sig: Take 1 tablet (75 mg total) by mouth daily   famotidine (PEPCID) 20 mg tablet 10/17/2019 at Unknown time  Yes Yes   Sig: Take 20 mg by mouth 2 (two) times a day   fluticasone-salmeterol (ADVAIR) 500-50 mcg/dose 10/17/2019 at Unknown time  Yes Yes   Sig: Inhale 1 puff every 12 (twelve) hours   folic acid (FOLVITE) 1 mg tablet 10/17/2019 at Unknown time  No Yes   Sig: Take 1 tablet (1 mg total) by mouth daily   fosinopril (MONOPRIL) 10 mg tablet 10/17/2019 at Unknown time  Yes Yes   Sig: Take 10 mg by mouth daily    metFORMIN (GLUCOPHAGE) 1000 MG tablet 10/17/2019 at Unknown time  Yes Yes   Sig: Take 1,000 mg by mouth 2 (two) times a day with meals   metoprolol succinate (TOPROL-XL) 50 mg 24 hr tablet 10/17/2019 at Unknown time  No Yes   Sig: Take 1 5 tablets (75 mg total) by mouth daily   Patient taking differently: Take 100 mg by mouth daily    ranolazine (RANEXA) 500 mg 12 hr tablet 10/17/2019 at Unknown time  No Yes   Sig: Take 1 tablet (500 mg total) by mouth 2 (two) times a day   rosuvastatin (CRESTOR) 20 MG tablet 10/17/2019 at Unknown time  No Yes   Sig: Take 1 tablet (20 mg total) by mouth daily   sertraline (ZOLOFT) 100 mg tablet 10/17/2019 at Unknown time  Yes Yes   Sig: Take 100 mg by mouth daily    tiotropium (SPIRIVA) 18 mcg inhalation capsule Unknown at Unknown time Self Yes No   Sig: Place 18 mcg into inhaler and inhale daily      Facility-Administered Medications: None     No Known Allergies    Objective   Vitals: Blood pressure 94/53, pulse 67, temperature 98 4 °F (36 9 °C), resp  rate 18, height 6' 2" (1 88 m), weight 92 4 kg (203 lb 11 3 oz), SpO2 90 %        Intake/Output Summary (Last 24 hours) at 10/17/2019 1719  Last data filed at 10/17/2019 1351  Gross per 24 hour   Intake 2100 ml   Output    Net 2100 ml       Invasive Devices     Peripheral Intravenous Line            Peripheral IV 10/17/19 Right Arm less than 1 day                Physical Exam   Constitutional: He is oriented to person, place, and time  No distress  Mild juandiced male, lyign in bed in no acute distress   HENT:   Head: Normocephalic and atraumatic  Eyes: Conjunctivae and EOM are normal  Right eye exhibits no discharge  Left eye exhibits no discharge  No scleral icterus  Neck: Neck supple  Neck pain on palpation; decreased ROM of neck due to pain   Cardiovascular: Normal rate, regular rhythm, normal heart sounds and intact distal pulses  Exam reveals no gallop and no friction rub  No murmur heard  Pulmonary/Chest: Effort normal and breath sounds normal  No stridor  No respiratory distress  He has no wheezes  He has no rales  He exhibits no tenderness  Abdominal: Soft  Bowel sounds are normal  There is tenderness  There is no rebound and no guarding  Mild epigastric tenderness   Musculoskeletal: Normal range of motion  He exhibits no edema, tenderness or deformity  Neurological: He is alert and oriented to person, place, and time  No cranial nerve deficit  He exhibits normal muscle tone  Skin: Skin is warm and dry  He is not diaphoretic         Lab Results:   Results for orders placed or performed during the hospital encounter of 10/17/19   Occult blood 1-3, stool   Result Value Ref Range    Fecal Occult Blood Diagnostic Positive (A) Negative    Fecal Occult Blood Diagnostic 2      Fecal Occult Blood Diagnostic 3     CBC and differential   Result Value Ref Range    WBC 6 38 4 31 - 10 16 Thousand/uL    RBC 3 87 (L) 3 88 - 5 62 Million/uL    Hemoglobin 12 8 12 0 - 17 0 g/dL    Hematocrit 38 5 36 5 - 49 3 %     (H) 82 - 98 fL    MCH 33 1 26 8 - 34 3 pg    MCHC 33 2 31 4 - 37 4 g/dL    RDW 13 4 11 6 - 15 1 %    MPV 11 4 8 9 - 12 7 fL    Platelets 201 (L) 258 - 390 Thousands/uL    nRBC 0 /100 WBCs Neutrophils Relative 61 43 - 75 %    Immat GRANS % 1 0 - 2 %    Lymphocytes Relative 20 14 - 44 %    Monocytes Relative 14 (H) 4 - 12 %    Eosinophils Relative 3 0 - 6 %    Basophils Relative 1 0 - 1 %    Neutrophils Absolute 3 97 1 85 - 7 62 Thousands/µL    Immature Grans Absolute 0 03 0 00 - 0 20 Thousand/uL    Lymphocytes Absolute 1 25 0 60 - 4 47 Thousands/µL    Monocytes Absolute 0 89 0 17 - 1 22 Thousand/µL    Eosinophils Absolute 0 19 0 00 - 0 61 Thousand/µL    Basophils Absolute 0 05 0 00 - 0 10 Thousands/µL   Basic metabolic panel   Result Value Ref Range    Sodium 130 (L) 136 - 145 mmol/L    Potassium 4 1 3 5 - 5 3 mmol/L    Chloride 94 (L) 100 - 108 mmol/L    CO2 21 21 - 32 mmol/L    ANION GAP 15 (H) 4 - 13 mmol/L    BUN 69 (H) 5 - 25 mg/dL    Creatinine 3 13 (H) 0 60 - 1 30 mg/dL    Glucose 98 65 - 140 mg/dL    Calcium 7 9 (L) 8 3 - 10 1 mg/dL    eGFR 21 ml/min/1 73sq m   CK Total with Reflex CKMB   Result Value Ref Range    Total  (H) 39 - 308 U/L   Ammonia   Result Value Ref Range    Ammonia <10 (L) 11 - 35 umol/L   Magnesium   Result Value Ref Range    Magnesium 1 5 (L) 1 6 - 2 6 mg/dL   CKMB   Result Value Ref Range    CK-MB Index 1 9 0 0 - 2 5 %    CK-MB 10 5 (H) 0 0 - 5 0 ng/mL     Imaging:   CT abdomen pelvis wo contrast   Final Result      No radiopaque urinary tract calculi or obstructive uropathy  No acute intra-abdominal or intrapelvic process insofar as can be detected on a noncontrast CT  Colonic diverticulosis  Hepatic steatosis  Small bilateral adrenal nodules  Its imaging features are diagnostic of benign adrenal adenoma, and does not need further followup or workup  If there are clinical signs or symptoms of adrenal hyperfunction, biochemical evaluation may be appropriate         Adrenal recommendation based on institutional consensus and Journal of [employer] of Radiology 2017;14:2789-5506      Moderate to severe degenerative changes in the lumbar spine                Workstation performed: [id number]         CT head without contrast   Final Result      No acute intracranial abnormality  Mild cerebral chronic microangiopathic disease  Mild inflammatory paranasal sinus disease  Workstation performed: [id number]         CT cervical spine without contrast   Final Result      No cervical spine fracture or traumatic malalignment  Scoliosis, spondylolisthesis and multilevel degenerative disease as described above  Moderate spinal stenosis at C4-C5  Workstation performed: [id number]         XR elbow 3+ views LEFT   Final Result      Nondisplaced cortical fracture distal humerus along its dorsal surface  Workstation performed: [id number]               Code Status: Full code    Counseling / Coordination of Care  Total floor / unit time spent today 30 minutes  Greater than 50% of total time was spent with the patient and / or family counseling and / or coordination of care  --  Gene Dolly, DO    "This note has been constructed using a voice recognition system  Therefore there may be syntax, spelling, and/or grammatical errors   Please call if you have any questions "

## 2019-10-17 NOTE — ASSESSMENT & PLAN NOTE
- Magnesium 1 5 on admission   - Patient given magnesium sulfate 2 g/50 mL IVPB (premix) x 2 during hospitalization  - follow up Magnesium level in outpatient

## 2019-10-17 NOTE — ASSESSMENT & PLAN NOTE
BUN/Cr on admission: 69/3 13 (baseline Cr 1 0); BP on admission 97/55  Patient had a previous admission in June 7, 2019 with hypotension and ENMA  - On admission total  CK MB fraction 10 5  - Continue IVF 100cc/hr  - Creatinine improved 3 13 -> 1 59 --> 0 97  - Nephro consulted, suspected volume depletion

## 2019-10-18 ENCOUNTER — APPOINTMENT (OUTPATIENT)
Dept: RADIOLOGY | Facility: HOSPITAL | Age: 59
DRG: 683 | End: 2019-10-18
Payer: MEDICARE

## 2019-10-18 LAB
ALBUMIN SERPL BCP-MCNC: 3 G/DL (ref 3.5–5)
ALP SERPL-CCNC: 65 U/L (ref 46–116)
ALT SERPL W P-5'-P-CCNC: 44 U/L (ref 12–78)
ANION GAP SERPL CALCULATED.3IONS-SCNC: 13 MMOL/L (ref 4–13)
AST SERPL W P-5'-P-CCNC: 39 U/L (ref 5–45)
ATRIAL RATE: 56 BPM
BASOPHILS # BLD AUTO: 0.05 THOUSANDS/ΜL (ref 0–0.1)
BASOPHILS NFR BLD AUTO: 1 % (ref 0–1)
BILIRUB SERPL-MCNC: 0.9 MG/DL (ref 0.2–1)
BUN SERPL-MCNC: 49 MG/DL (ref 5–25)
CALCIUM SERPL-MCNC: 7.8 MG/DL (ref 8.3–10.1)
CHLORIDE SERPL-SCNC: 105 MMOL/L (ref 100–108)
CO2 SERPL-SCNC: 22 MMOL/L (ref 21–32)
CREAT SERPL-MCNC: 1.59 MG/DL (ref 0.6–1.3)
EOSINOPHIL # BLD AUTO: 0.15 THOUSAND/ΜL (ref 0–0.61)
EOSINOPHIL NFR BLD AUTO: 3 % (ref 0–6)
ERYTHROCYTE [DISTWIDTH] IN BLOOD BY AUTOMATED COUNT: 13.5 % (ref 11.6–15.1)
EST. AVERAGE GLUCOSE BLD GHB EST-MCNC: 103 MG/DL
FOLATE SERPL-MCNC: 13.3 NG/ML (ref 3.1–17.5)
GFR SERPL CREATININE-BSD FRML MDRD: 47 ML/MIN/1.73SQ M
GLUCOSE SERPL-MCNC: 102 MG/DL (ref 65–140)
GLUCOSE SERPL-MCNC: 136 MG/DL (ref 65–140)
GLUCOSE SERPL-MCNC: 81 MG/DL (ref 65–140)
GLUCOSE SERPL-MCNC: 81 MG/DL (ref 65–140)
GLUCOSE SERPL-MCNC: 87 MG/DL (ref 65–140)
GLUCOSE SERPL-MCNC: 95 MG/DL (ref 65–140)
HBA1C MFR BLD: 5.2 % (ref 4.2–6.3)
HCT VFR BLD AUTO: 38.1 % (ref 36.5–49.3)
HGB BLD-MCNC: 12.5 G/DL (ref 12–17)
IMM GRANULOCYTES # BLD AUTO: 0.02 THOUSAND/UL (ref 0–0.2)
IMM GRANULOCYTES NFR BLD AUTO: 0 % (ref 0–2)
LYMPHOCYTES # BLD AUTO: 1.15 THOUSANDS/ΜL (ref 0.6–4.47)
LYMPHOCYTES NFR BLD AUTO: 22 % (ref 14–44)
MAGNESIUM SERPL-MCNC: 1.5 MG/DL (ref 1.6–2.6)
MCH RBC QN AUTO: 33.2 PG (ref 26.8–34.3)
MCHC RBC AUTO-ENTMCNC: 32.8 G/DL (ref 31.4–37.4)
MCV RBC AUTO: 101 FL (ref 82–98)
MONOCYTES # BLD AUTO: 0.97 THOUSAND/ΜL (ref 0.17–1.22)
MONOCYTES NFR BLD AUTO: 18 % (ref 4–12)
NEUTROPHILS # BLD AUTO: 2.92 THOUSANDS/ΜL (ref 1.85–7.62)
NEUTS SEG NFR BLD AUTO: 56 % (ref 43–75)
NRBC BLD AUTO-RTO: 0 /100 WBCS
P AXIS: 68 DEGREES
PHOSPHATE SERPL-MCNC: 3.2 MG/DL (ref 2.7–4.5)
PLATELET # BLD AUTO: 106 THOUSANDS/UL (ref 149–390)
PMV BLD AUTO: 12 FL (ref 8.9–12.7)
POTASSIUM SERPL-SCNC: 4 MMOL/L (ref 3.5–5.3)
PR INTERVAL: 124 MS
PROT SERPL-MCNC: 6.6 G/DL (ref 6.4–8.2)
QRS AXIS: 64 DEGREES
QRSD INTERVAL: 94 MS
QT INTERVAL: 456 MS
QTC INTERVAL: 440 MS
RBC # BLD AUTO: 3.76 MILLION/UL (ref 3.88–5.62)
SODIUM SERPL-SCNC: 140 MMOL/L (ref 136–145)
T WAVE AXIS: 74 DEGREES
VENTRICULAR RATE: 56 BPM
VIT B12 SERPL-MCNC: 527 PG/ML (ref 100–900)
WBC # BLD AUTO: 5.26 THOUSAND/UL (ref 4.31–10.16)

## 2019-10-18 PROCEDURE — 93010 ELECTROCARDIOGRAM REPORT: CPT | Performed by: INTERNAL MEDICINE

## 2019-10-18 PROCEDURE — 73200 CT UPPER EXTREMITY W/O DYE: CPT

## 2019-10-18 PROCEDURE — 99222 1ST HOSP IP/OBS MODERATE 55: CPT | Performed by: ORTHOPAEDIC SURGERY

## 2019-10-18 PROCEDURE — 94762 N-INVAS EAR/PLS OXIMTRY CONT: CPT

## 2019-10-18 PROCEDURE — NC001 PR NO CHARGE: Performed by: ORTHOPAEDIC SURGERY

## 2019-10-18 PROCEDURE — 99233 SBSQ HOSP IP/OBS HIGH 50: CPT | Performed by: FAMILY MEDICINE

## 2019-10-18 PROCEDURE — 82948 REAGENT STRIP/BLOOD GLUCOSE: CPT

## 2019-10-18 PROCEDURE — 85025 COMPLETE CBC W/AUTO DIFF WBC: CPT | Performed by: STUDENT IN AN ORGANIZED HEALTH CARE EDUCATION/TRAINING PROGRAM

## 2019-10-18 PROCEDURE — 83735 ASSAY OF MAGNESIUM: CPT | Performed by: STUDENT IN AN ORGANIZED HEALTH CARE EDUCATION/TRAINING PROGRAM

## 2019-10-18 PROCEDURE — 80053 COMPREHEN METABOLIC PANEL: CPT | Performed by: STUDENT IN AN ORGANIZED HEALTH CARE EDUCATION/TRAINING PROGRAM

## 2019-10-18 PROCEDURE — 99222 1ST HOSP IP/OBS MODERATE 55: CPT | Performed by: INTERNAL MEDICINE

## 2019-10-18 PROCEDURE — 84100 ASSAY OF PHOSPHORUS: CPT | Performed by: STUDENT IN AN ORGANIZED HEALTH CARE EDUCATION/TRAINING PROGRAM

## 2019-10-18 PROCEDURE — 94760 N-INVAS EAR/PLS OXIMETRY 1: CPT

## 2019-10-18 PROCEDURE — NC001 PR NO CHARGE: Performed by: PHYSICIAN ASSISTANT

## 2019-10-18 RX ORDER — MAGNESIUM SULFATE HEPTAHYDRATE 40 MG/ML
2 INJECTION, SOLUTION INTRAVENOUS ONCE
Status: COMPLETED | OUTPATIENT
Start: 2019-10-18 | End: 2019-10-18

## 2019-10-18 RX ORDER — MAGNESIUM SULFATE HEPTAHYDRATE 40 MG/ML
2 INJECTION, SOLUTION INTRAVENOUS ONCE
Status: DISCONTINUED | OUTPATIENT
Start: 2019-10-18 | End: 2019-10-18

## 2019-10-18 RX ORDER — MAGNESIUM SULFATE 1 G/100ML
1 INJECTION INTRAVENOUS ONCE
Status: DISCONTINUED | OUTPATIENT
Start: 2019-10-18 | End: 2019-10-18

## 2019-10-18 RX ADMIN — RANOLAZINE 500 MG: 500 TABLET, FILM COATED, EXTENDED RELEASE ORAL at 12:09

## 2019-10-18 RX ADMIN — FOLIC ACID 1 MG: 1 TABLET ORAL at 12:10

## 2019-10-18 RX ADMIN — AMLODIPINE BESYLATE 5 MG: 5 TABLET ORAL at 12:10

## 2019-10-18 RX ADMIN — ATORVASTATIN CALCIUM 40 MG: 40 TABLET, FILM COATED ORAL at 16:12

## 2019-10-18 RX ADMIN — FAMOTIDINE 20 MG: 20 TABLET ORAL at 17:45

## 2019-10-18 RX ADMIN — NICOTINE 1 PATCH: 21 PATCH, EXTENDED RELEASE TRANSDERMAL at 12:12

## 2019-10-18 RX ADMIN — TAMSULOSIN HYDROCHLORIDE 0.4 MG: 0.4 CAPSULE ORAL at 12:10

## 2019-10-18 RX ADMIN — METOPROLOL SUCCINATE 100 MG: 100 TABLET, EXTENDED RELEASE ORAL at 12:11

## 2019-10-18 RX ADMIN — FAMOTIDINE 20 MG: 20 TABLET ORAL at 12:11

## 2019-10-18 RX ADMIN — CLOPIDOGREL BISULFATE 75 MG: 75 TABLET ORAL at 12:09

## 2019-10-18 RX ADMIN — MAGNESIUM SULFATE HEPTAHYDRATE 2 G: 40 INJECTION, SOLUTION INTRAVENOUS at 12:08

## 2019-10-18 RX ADMIN — SODIUM CHLORIDE 100 ML/HR: 0.9 INJECTION, SOLUTION INTRAVENOUS at 04:00

## 2019-10-18 RX ADMIN — Medication 1 TABLET: at 12:08

## 2019-10-18 RX ADMIN — TIOTROPIUM BROMIDE 18 MCG: 18 CAPSULE ORAL; RESPIRATORY (INHALATION) at 12:12

## 2019-10-18 RX ADMIN — SODIUM CHLORIDE 100 ML/HR: 0.9 INJECTION, SOLUTION INTRAVENOUS at 16:19

## 2019-10-18 RX ADMIN — RANOLAZINE 500 MG: 500 TABLET, FILM COATED, EXTENDED RELEASE ORAL at 17:45

## 2019-10-18 RX ADMIN — SERTRALINE HYDROCHLORIDE 100 MG: 100 TABLET ORAL at 12:10

## 2019-10-18 NOTE — QUICK NOTE
Patient deemed unstable for discharge at this time  Patient has a KI still not resolved and needs more fluid to recover  Patient awaiting further recommendations and treatments  --  Sandy Lora, DO    "This note has been constructed using a voice recognition system  Therefore there may be syntax, spelling, and/or grammatical errors   Please call if you have any questions "

## 2019-10-18 NOTE — PROGRESS NOTES
[facility] [place], [facility] by [facility] Practice Progress Note - Odette Chilel 64 y o  male MRN: [id number]    Unit/Bed#: [address] [place] [address] Encounter: [id number]      Assessment/Plan:  Weakness  Assessment & Plan  Patient admits to generalized weakness for the past few months in his limbs  Patient fell by his bed yesterday with no sustained injuries  - CT head: No acute intracranial abnormality  Mild cerebral chronic microangiopathic disease  Mild inflammatory paranasal sinus disease  -CT abdomen: No radiopaque urinary tract calculi or obstructive uropathy  No acute intra-abdominal or intrapelvic process insofar as can be detected on a noncontrast CT  Colonic diverticulosis  Hepatic steatosis  Small bilateral adrenal nodules  Its imaging features are diagnostic of benign adrenal adenoma, and does not need further followup or workup  If there are clinical signs or symptoms of adrenal hyperfunction, biochemical evaluation may be appropriate  Moderate to severe degenerative changes in the lumbar spine   - Magnesium 1 5 on admission likely 2/2 to patient's alcohol abuse  - Nephrology consulted, stated it is safe to replenish Mg 2+  - Patient given magnesium sulfate 2 g/50 mL IVPB (premix) 2 g today, received 1g yesterday      * Acute kidney injury superimposed on CKD ([facility] [address] )  Assessment & Plan  BUN/Cr on admission: 69/3 13 (baseline Cr 1 0); BP on admission 97/55  Patient had a previous admission in June 7, 2019 with hypotension and ENMA  - On admission total  CK MB fraction 10 5  - Continue IVF 100cc/hr  - Creatinine improved 3 13 -> 1 59  - Nephro consulted, suspected volume depletion   Advised continue IVF and recheck BMP in AM    Cervical spinal stenosis  Assessment & Plan  - Ortho consulted, recommend conservative treatment with physical therapy without restriction    Left humeral fracture  Assessment & Plan  X-ray left elbow: Nondisplaced cortical fracture distal humerus along its dorsal surface   - Ortho consulted and will treat conservatively with immobilization and re-evaluation 1 week  A posterior splint was applied to his left elbow to keep him in neutral position and a sling was applied  He should keep the sling and splint in place for 1 week until he is re-evaluated  He should be nonweightbearing in the left upper extremity  Hyponatremia  Assessment & Plan  CT abdomen and pelvis: Small bilateral adrenal nodules  Its imaging features are diagnostic of benign adrenal adenoma, and does not need further followup or workup  If there are clinical signs or symptoms of adrenal hyperfunction, biochemical evaluation may be appropriate      - Na+ on admission 130 improved to 140 today  - Continue normal saline 100 cc/hour    Alcohol abuse  Assessment & Plan  - Folate and B12 WNL  - Patient received thiamine, B12 and folate supplements  - EtOH level <3 at 10/17/19 20:19  - continue CIWA protocol  - CMP showed AST/ALT WNL  - fall precaution  - patient Education on drinking cessation    Nicotine abuse  Assessment & Plan  Nicotine patch  Patient education on smoking cessation    CAD (coronary artery disease)  Assessment & Plan  - Continue home medication amlodipine 5 mg p o  Q d , Plavix 75 mg p o  Q d , metoprolol succinate 100 mg p o  Q d , ranolazine 500 mg p o  B i d   -f/u EKG showed sinus bradycardia and possible left atrial enlargement    History of prostate cancer  Assessment & Plan  Status post resection and radiation on remission  HLD (hyperlipidemia)  Assessment & Plan  HDL 59, , TG 75  Stable    Hx of CABG  Assessment & Plan      History of CABG in 2004  Stent in 2014  Patient's cardiologist from [place], [place]  Depression  Assessment & Plan  Continue with Zoloft 100 mg tablet p o  Q d      Hypomagnesemia  Assessment & Plan  - Magnesium 1 5 on admission unchanged on repeat 5 AM CMP  - Patient given magnesium sulfate 2 g/50 mL IVPB (premix) 2 g today, received 1g yesterday    Essential (primary) hypertension  Assessment & Plan  BP on admission is 119/59  Stable  Continue with home medication Norvasc 5 mg tablet p o  Q d  Monopril 10 mg p o  Q d  Metoprolol succinate 100 mg p o  Q d  Monitor BP    Type 2 diabetes mellitus ([facility] [address] )  Assessment & Plan  Lab Results   Component Value Date    HGBA1C 5 4 06/07/2019       No results for input(s): POCGLU in the last 72 hours  Blood Sugar Average: Last 72 hrs:     -POC glucose on admission 98  -ISS    COPD (chronic obstructive pulmonary disease) ([facility])  Assessment & Plan  -DuoNeb p r n  Q 4h, DuoNeb q 6 resp  -Continue with Breo Ellipta in place of home Spiriva  -O2 p r n  Assessment and plan discussed with the attending physician on call  Subjective:   Patient seen and examined at bedside  No acute events over night  Resting comfortably in bed  No longer complaining of any leg cramping  Still feels tremulous with some anxiety but denies palpitations, sweating or audio/visual hallucinations  Objective:     Vitals: Blood pressure 115/56, pulse 63, temperature 97 8 °F (36 6 °C), temperature source Tympanic, resp  rate 18, height 6' 2" (1 88 m), weight 92 4 kg (203 lb 11 3 oz), SpO2 96 %  ,Body mass index is 26 15 kg/m²  Wt Readings from Last 3 Encounters:   10/17/19 92 4 kg (203 lb 11 3 oz)   07/22/19 91 2 kg (201 lb)   07/02/19 107 kg (235 lb)       Intake/Output Summary (Last 24 hours) at 10/18/2019 1602  Last data filed at 10/18/2019 0356  Gross per 24 hour   Intake    Output 2550 ml   Net -2550 ml       Physical Exam:   General: No acute distress  Skin: Mild jaundice, 15 5 cm excoriation over upper back  Respiratory: Mild inspiratory wheezing and crackles L>R lobe  Cardiovascular: RRR, no murmurs/rubs/gallops  Gastrointestinal: Minimal epigastric tenderness to palpation, non-distended, no rebound tenderness, no ascites or hepatomegaly  Negative Peres's sign  Musculoskeletal: Neck - Full ROM and no weakness, no tenderness to palpation    Left Elbow - Full ROM, tenderness with extension and internal rotation  Tenderness to palpation of distal humerus  No weakness       Recent Results (from the past 24 hour(s))   Ethanol    Collection Time: 10/17/19  8:19 PM   Result Value Ref Range    Ethanol Lvl <3 0 - 3 mg/dL   Fingerstick Glucose (POCT)    Collection Time: 10/17/19  9:49 PM   Result Value Ref Range    POC Glucose 81 65 - 140 mg/dl   Fingerstick Glucose (POCT)    Collection Time: 10/18/19  1:24 AM   Result Value Ref Range    POC Glucose 81 65 - 140 mg/dl   Comprehensive metabolic panel    Collection Time: 10/18/19  5:01 AM   Result Value Ref Range    Sodium 140 136 - 145 mmol/L    Potassium 4 0 3 5 - 5 3 mmol/L    Chloride 105 100 - 108 mmol/L    CO2 22 21 - 32 mmol/L    ANION GAP 13 4 - 13 mmol/L    BUN 49 (H) 5 - 25 mg/dL    Creatinine 1 59 (H) 0 60 - 1 30 mg/dL    Glucose 81 65 - 140 mg/dL    Calcium 7 8 (L) 8 3 - 10 1 mg/dL    AST 39 5 - 45 U/L    ALT 44 12 - 78 U/L    Alkaline Phosphatase 65 46 - 116 U/L    Total Protein 6 6 6 4 - 8 2 g/dL    Albumin 3 0 (L) 3 5 - 5 0 g/dL    Total Bilirubin 0 90 0 20 - 1 00 mg/dL    eGFR 47 ml/min/1 73sq m   Magnesium    Collection Time: 10/18/19  5:01 AM   Result Value Ref Range    Magnesium 1 5 (L) 1 6 - 2 6 mg/dL   Phosphorus    Collection Time: 10/18/19  5:01 AM   Result Value Ref Range    Phosphorus 3 2 2 7 - 4 5 mg/dL   CBC and differential    Collection Time: 10/18/19  5:01 AM   Result Value Ref Range    WBC 5 26 4 31 - 10 16 Thousand/uL    RBC 3 76 (L) 3 88 - 5 62 Million/uL    Hemoglobin 12 5 12 0 - 17 0 g/dL    Hematocrit 38 1 36 5 - 49 3 %     (H) 82 - 98 fL    MCH 33 2 26 8 - 34 3 pg    MCHC 32 8 31 4 - 37 4 g/dL    RDW 13 5 11 6 - 15 1 %    MPV 12 0 8 9 - 12 7 fL    Platelets 942 (L) 358 - 390 Thousands/uL    nRBC 0 /100 WBCs    Neutrophils Relative 56 43 - 75 %    Immat GRANS % 0 0 - 2 %    Lymphocytes Relative 22 14 - 44 %    Monocytes Relative 18 (H) 4 - 12 %    Eosinophils Relative 3 0 - 6 % Basophils Relative 1 0 - 1 %    Neutrophils Absolute 2 92 1 85 - 7 62 Thousands/µL    Immature Grans Absolute 0 02 0 00 - 0 20 Thousand/uL    Lymphocytes Absolute 1 15 0 60 - 4 47 Thousands/µL    Monocytes Absolute 0 97 0 17 - 1 22 Thousand/µL    Eosinophils Absolute 0 15 0 00 - 0 61 Thousand/µL    Basophils Absolute 0 05 0 00 - 0 10 Thousands/µL   Fingerstick Glucose (POCT)    Collection Time: 10/18/19  6:06 AM   Result Value Ref Range    POC Glucose 87 65 - 140 mg/dl       Current Facility-Administered Medications   Medication Dose Route Frequency Provider Last Rate Last Dose    amLODIPine (NORVASC) tablet 5 mg  5 mg Oral Daily Damian Andrade DO   5 mg at 10/18/19 1210    atorvastatin (LIPITOR) tablet 40 mg  40 mg Oral Daily With [address], DO   40 mg at 10/17/19 1819    clopidogrel (PLAVIX) tablet 75 mg  75 mg Oral Daily Damian Andrade, DO   75 mg at 10/18/19 1209    famotidine (PEPCID) tablet 20 mg  20 mg Oral BID Damian Andrade DO   20 mg at 45/98/00 9716    folic acid (FOLVITE) tablet 1 mg  1 mg Oral Daily Damian Andrade DO   1 mg at 10/18/19 1210    insulin lispro (HumaLOG) 100 units/mL subcutaneous injection 1-6 Units  1-6 Units Subcutaneous Q6H [address] [place],         ipratropium-albuterol (DUO-NEB) 0 5-2 5 mg/3 mL inhalation solution 3 mL  3 mL Nebulization 4x Daily PRN Damian Andrade DO        magnesium sulfate 2 g/50 mL IVPB (premix) 2 g  2 g Intravenous Once Josse Nieves MD 12 5 mL/hr at 10/18/19 1208 2 g at 10/18/19 1208    metoprolol succinate (TOPROL-XL) 24 hr tablet 100 mg  100 mg Oral Daily Damian Andrade DO   100 mg at 10/18/19 1211    multivitamin-minerals (CENTRUM) tablet 1 tablet  1 tablet Oral Daily Josse Nieves MD   1 tablet at 10/18/19 1208    nicotine (NICODERM CQ) 21 mg/24 hr TD 24 hr patch 1 patch  1 patch Transdermal Daily Damian Andrade DO   1 patch at 10/18/19 1212    ranolazine (RANEXA) 12 hr tablet 500 mg  500 mg Oral BID Damian Andrade DO   500 mg at 10/18/19 1209  sertraline (ZOLOFT) tablet 100 mg  100 mg Oral Daily Damian Andrade, DO   100 mg at 10/18/19 1210    sodium chloride 0 9 % infusion  100 mL/hr Intravenous Continuous Damian Andrade,  mL/hr at 10/18/19 0400 100 mL/hr at 10/18/19 0400    tamsulosin (FLOMAX) capsule 0 4 mg  0 4 mg Oral Daily Damian Andrade, DO   0 4 mg at 10/18/19 1210    tiotropium (SPIRIVA) capsule for inhaler 18 mcg  18 mcg Inhalation Daily Damian Andrade, DO   18 mcg at 10/18/19 1212       Invasive Devices     Peripheral Intravenous Line            Peripheral IV 10/17/19 Right Arm 1 day                Lab, Imaging and other studies: I have personally reviewed pertinent reports  VTE Pharmacologic Prophylaxis: Heparin  VTE Mechanical Prophylaxis: sequential compression device    --  DO Guy Leiva Prus, PGY-III  "This note has been constructed using a voice recognition system  Therefore there may be syntax, spelling, and/or grammatical errors   Please call if you have any questions "

## 2019-10-18 NOTE — PROCEDURES
Splint application  Date/Time: 10/18/2019 8:30 AM  Performed by: Erin Cordova PA-C  Authorized by: Erin Cordova PA-C     Patient location:  Bedside  Procedure performed by emergency physician: No    Other Assisting Provider: No    Consent:     Consent obtained:  Verbal    Consent given by:  Patient    Risks discussed:  Pain, swelling and numbness    Alternatives discussed:  No treatment and observation  Universal protocol:     Procedure explained and questions answered to patient or proxy's satisfaction: yes      Relevant documents present and verified: yes      Test results available and properly labeled: yes      Radiology Images displayed and confirmed  If images not available, report reviewed: yes      Site/side marked: yes      Patient identity confirmed:  Verbally with patient  Indication:     Indications: fracture    Pre-procedure details:     Sensation:  Normal  Procedure details:     Laterality:  Left    Location:  Elbow    Elbow:  L elbow    Strapping: no      Splint type:  Long arm    Supplies:  Ortho-Glass, cotton padding, sling and elastic bandage  Post-procedure details:     Pain:  Unchanged    Sensation:  Normal    Neurovascular Exam: skin pink, capillary refill <2 sec, normal pulses and skin intact, warm, and dry      Patient tolerance of procedure:   Tolerated well, no immediate complications

## 2019-10-18 NOTE — PLAN OF CARE
Problem: PAIN - ADULT  Goal: Verbalizes/displays adequate comfort level or baseline comfort level  Description  Interventions:  - Encourage patient to monitor pain and request assistance  - Assess pain using appropriate pain scale  - Administer analgesics based on type and severity of pain and evaluate response  - Implement non-pharmacological measures as appropriate and evaluate response  - Consider cultural and social influences on pain and pain management  - Notify physician/advanced practitioner if interventions unsuccessful or patient reports new pain  Outcome: Progressing     Problem: SAFETY ADULT  Goal: Patient will remain free of falls  Description  INTERVENTIONS:  - Assess patient frequently for physical needs  -  Identify cognitive and physical deficits and behaviors that affect risk of falls    -  [place] fall precautions as indicated by assessment   - Educate patient/family on patient safety including physical limitations  - Instruct patient to call for assistance with activity based on assessment  - Modify environment to reduce risk of injury  - Consider OT/PT consult to assist with strengthening/mobility  Outcome: Progressing  Goal: Maintain or return to baseline ADL function  Description  INTERVENTIONS:  -  Assess patient's ability to carry out ADLs; assess patient's baseline for ADL function and identify physical deficits which impact ability to perform ADLs (bathing, care of mouth/teeth, toileting, grooming, dressing, etc )  - Assess/evaluate cause of self-care deficits   - Assess range of motion  - Assess patient's mobility; develop plan if impaired  - Assess patient's need for assistive devices and provide as appropriate  - Encourage maximum independence but intervene and supervise when necessary  - Involve family in performance of ADLs  - Assess for home care needs following discharge   - Consider OT consult to assist with ADL evaluation and planning for discharge  - Provide patient education as appropriate  Outcome: Progressing  Goal: Maintain or return mobility status to optimal level  Description  INTERVENTIONS:  - Assess patient's baseline mobility status (ambulation, transfers, stairs, etc )    - Identify cognitive and physical deficits and behaviors that affect mobility  - Identify mobility aids required to assist with transfers and/or ambulation (gait belt, sit-to-stand, lift, walker, cane, etc )  - [place] fall precautions as indicated by assessment  - Record patient progress and toleration of activity level on Mobility SBAR; progress patient to next Phase/Stage  - Instruct patient to call for assistance with activity based on assessment  - Consider rehabilitation consult to assist with strengthening/weightbearing, etc   Outcome: Progressing     Problem: DISCHARGE PLANNING  Goal: Discharge to home or other facility with appropriate resources  Description  INTERVENTIONS:  - Identify barriers to discharge w/patient and caregiver  - Arrange for needed discharge resources and transportation as appropriate  - Identify discharge learning needs (meds, wound care, etc )  - Arrange for interpretive services to assist at discharge as needed  - Refer to Case Management Department for coordinating discharge planning if the patient needs post-hospital services based on physician/advanced practitioner order or complex needs related to functional status, cognitive ability, or social support system  Outcome: Progressing     Problem: Knowledge Deficit  Goal: Patient/family/caregiver demonstrates understanding of disease process, treatment plan, medications, and discharge instructions  Description  Complete learning assessment and assess knowledge base    Interventions:  - Provide teaching at level of understanding  - Provide teaching via preferred learning methods  Outcome: Progressing     Problem: RESPIRATORY - ADULT  Goal: Achieves optimal ventilation and oxygenation  Description  INTERVENTIONS:  - Assess for changes in respiratory status  - Assess for changes in mentation and behavior  - Position to facilitate oxygenation and minimize respiratory effort  - Oxygen administered by appropriate delivery if ordered  - Initiate smoking cessation education as indicated  - Encourage broncho-pulmonary hygiene including cough, deep breathe, Incentive Spirometry  - Assess the need for suctioning and aspirate as needed  - Assess and instruct to report SOB or any respiratory difficulty  - Respiratory Therapy support as indicated  Outcome: Progressing     Problem: Potential for Falls  Goal: Patient will remain free of falls  Description  INTERVENTIONS:  - Assess patient frequently for physical needs  -  Identify cognitive and physical deficits and behaviors that affect risk of falls    -  [place] fall precautions as indicated by assessment   - Educate patient/family on patient safety including physical limitations  - Instruct patient to call for assistance with activity based on assessment  - Modify environment to reduce risk of injury  - Consider OT/PT consult to assist with strengthening/mobility  Outcome: Progressing

## 2019-10-18 NOTE — CONSULTS
Consultation - Orthopedics   Robbi Ny 64 y o  male MRN: [id number]  Unit/Bed#: [address] [place] [address] Encounter: [id number]      Assessment/Plan     Assessment:  1) Left distal humerus posterior impaction fracture - this appears to be nondisplaced  He has full range of motion and full strength, but is still significantly tender over the posterior elbow  Therefore, we will treat this conservatively with immobilization and re-evaluation 1 week  A posterior splint was applied to his left elbow to keep him in neutral position and a sling was applied  He should keep the sling and splint in place for 1 week until he is re-evaluated  He should be nonweightbearing in the left upper extremity  2) Cervical spinal stenosis -  no concerning findings on exam or CT scan, and he does not have any radicular symptoms currently that indicate symptomatic stenosis  We recommend conservative treatment with physical therapy without restriction  He can follow up with [facility] is an outpatient if his symptoms persist or worsen  Plan:  - NWB LUE, leave splint and sling in place  - No further workup or intervention required at this time  - Analgesia PRN  - PT/OT for cervical spine  - DVT prophylaxis per medical team  - Follow up with Dr Anthony Calvo in one week as outpatient for repeat evaluation and x-ray of left elbow  May follow up as needed for cervical spine with spine and pain       History of Present Illness   Physician Requesting Consult: Caitlyn Ybarra MD  Reason for Consult / Principal Problem: Fall  HPI: Robbi Ny is a 64y o  year old right-hand-dominant male who presents after a fall at home yesterday  He has a past medical history significant for chronic alcohol abuse, COPD, hyperlipidemia, hypertension, diabetes, and depression  He is unable to recall the specific details of his fall, but believes that he hit his left elbow yesterday morning  He presented to the emergency department for weakness  X-rays were taken of his left elbow which demonstrate a posterior distal humerus impaction type fracture  He complains of left elbow pain when he leans on it, but not with motion  He denies any history of injury or surgery to the left elbow  Currently denies any neck pain  He denies any paresthesias in either upper extremity  He denies any chest pain, shortness of breath, dizziness, lightheadedness, nausea, or vomiting  Inpatient consult to Orthopedic Surgery  Consult performed by: Teagan Turcios PA-C  Consult ordered by: Jonelle Castillo DO          Review of Systems   Constitutional: Negative  HENT: Negative  Eyes: Negative  Respiratory: Negative  Cardiovascular: Negative  Gastrointestinal: Negative  Endocrine: Negative  Genitourinary: Negative  Musculoskeletal: Positive for arthralgias, joint swelling and myalgias  Skin: Negative  Allergic/Immunologic: Negative  Neurological: Positive for tremors and weakness  Hematological: Negative  Psychiatric/Behavioral: Negative          Historical Information   Past Medical History:   Diagnosis Date    Cancer [facility])     prostate ca,had radiation    Cardiac disease     stents,then triple bypass    [place] obstructive [place]) [place] [address] )     Diabetes mellitus ([place] [address] )     ETOH abuse     Hx of heart artery stent     2014    Hyperlipidemia     Hypertension     [facility] )     Renal disorder     pyelonephritis    S/P CABG x 1     2004     Past Surgical History:   Procedure Laterality Date    CORONARY ARTERY BYPASS GRAFT  2004    TONSILLECTOMY       Social History   Social History     Substance and Sexual Activity   Alcohol Use Yes    Frequency: 4 or more times a week    Drinks per session: 10 or more    Comment: 1/5 of vodka per day     Social History     Substance and Sexual Activity   Drug Use No     Social History     Tobacco Use   Smoking Status Current Every Day Smoker    Packs/day: 1 50    Years: 40 00    Pack years: 60 00   Smokeless Tobacco Never Used     Family History:   Family History   Problem Relation Age of Onset    Diabetes Mother     Uterine cancer Mother     COPD Father     Hypertension Father        Meds/Allergies   all current active meds have been reviewed, current meds:   Current Facility-Administered Medications   Medication Dose Route Frequency    amLODIPine (NORVASC) tablet 5 mg  5 mg Oral Daily    atorvastatin (LIPITOR) tablet 40 mg  40 mg Oral Daily With Dinner    clopidogrel (PLAVIX) tablet 75 mg  75 mg Oral Daily    famotidine (PEPCID) tablet 20 mg  20 mg Oral BID    folic acid (FOLVITE) tablet 1 mg  1 mg Oral Daily    insulin lispro (HumaLOG) 100 units/mL subcutaneous injection 1-6 Units  1-6 Units Subcutaneous Q6H [address]    ipratropium-albuterol (DUO-NEB) 0 5-2 5 mg/3 mL inhalation solution 3 mL  3 mL Nebulization 4x Daily PRN    magnesium sulfate 2 g/50 mL IVPB (premix) 2 g  2 g Intravenous Once    metoprolol succinate (TOPROL-XL) 24 hr tablet 100 mg  100 mg Oral Daily    multivitamin-minerals (CENTRUM) tablet 1 tablet  1 tablet Oral Daily    nicotine (NICODERM CQ) 21 mg/24 hr TD 24 hr patch 1 patch  1 patch Transdermal Daily    ranolazine (RANEXA) 12 hr tablet 500 mg  500 mg Oral BID    sertraline (ZOLOFT) tablet 100 mg  100 mg Oral Daily    sodium chloride 0 9 % infusion  100 mL/hr Intravenous Continuous    tamsulosin (FLOMAX) capsule 0 4 mg  0 4 mg Oral Daily    tiotropium (SPIRIVA) capsule for inhaler 18 mcg  18 mcg Inhalation Daily    and PTA meds:   Prior to Admission Medications   Prescriptions Last Dose Informant Patient Reported? Taking?    Blood Glucose Monitoring Suppl (ONE TOUCH ULTRA MINI) w/Device KIT More than a month at Unknown time  Yes No   Sig: Use as directed   ONE TOUCH ULTRA TEST test strip More than a month at Unknown time  Yes No   Sig: 3 (three) times a day Test   [facility] LANCETS FINE MISC More than a month at Unknown time  Yes No Sig: 3 (three) times a day Test   TAMSULOSIN HCL PO 10/17/2019 at Unknown time Self Yes Yes   Sig: Take 0 4 mg by mouth daily   albuterol (PROVENTIL HFA,VENTOLIN HFA) 90 mcg/act inhaler Unknown at Unknown time  No No   Sig: Inhale 2 puffs every 4 (four) hours as needed for wheezing   amLODIPine (NORVASC) 5 mg tablet 10/17/2019 at Unknown time  Yes Yes   Sig: Take 5 mg by mouth daily   clopidogrel (PLAVIX) 75 mg tablet 10/17/2019 at Unknown time  No Yes   Sig: Take 1 tablet (75 mg total) by mouth daily   famotidine (PEPCID) 20 mg tablet 10/17/2019 at Unknown time  Yes Yes   Sig: Take 20 mg by mouth 2 (two) times a day   fluticasone-salmeterol (ADVAIR) 500-50 mcg/dose 10/17/2019 at Unknown time  Yes Yes   Sig: Inhale 1 puff every 12 (twelve) hours   folic acid (FOLVITE) 1 mg tablet 10/17/2019 at Unknown time  No Yes   Sig: Take 1 tablet (1 mg total) by mouth daily   fosinopril (MONOPRIL) 10 mg tablet 10/17/2019 at Unknown time  Yes Yes   Sig: Take 10 mg by mouth daily    metFORMIN (GLUCOPHAGE) 1000 MG tablet 10/17/2019 at Unknown time  Yes Yes   Sig: Take 1,000 mg by mouth 2 (two) times a day with meals   metoprolol succinate (TOPROL-XL) 50 mg 24 hr tablet 10/17/2019 at Unknown time  No Yes   Sig: Take 1 5 tablets (75 mg total) by mouth daily   Patient taking differently: Take 100 mg by mouth daily    ranolazine (RANEXA) 500 mg 12 hr tablet 10/17/2019 at Unknown time  No Yes   Sig: Take 1 tablet (500 mg total) by mouth 2 (two) times a day   rosuvastatin (CRESTOR) 20 MG tablet 10/17/2019 at Unknown time  No Yes   Sig: Take 1 tablet (20 mg total) by mouth daily   sertraline (ZOLOFT) 100 mg tablet 10/17/2019 at Unknown time  Yes Yes   Sig: Take 100 mg by mouth daily    tiotropium (SPIRIVA) 18 mcg inhalation capsule Unknown at Unknown time Self Yes No   Sig: Place 18 mcg into inhaler and inhale daily      Facility-Administered Medications: None     No Known Allergies    Objective   Vitals: Blood pressure 146/68, pulse 75, temperature 97 8 °F (36 6 °C), temperature source Tympanic, resp  rate 18, height 6' 2" (1 88 m), weight 92 4 kg (203 lb 11 3 oz), SpO2 96 %  ,Body mass index is 26 15 kg/m²  Intake/Output Summary (Last 24 hours) at 10/18/2019 1049  Last data filed at 10/18/2019 0356  Gross per 24 hour   Intake 2100 ml   Output 2550 ml   Net -450 ml     I/O last 24 hours: In: 2100 [IV Piggyback:2100]  Out: 2550 [Urine:2550]    Invasive Devices     Peripheral Intravenous Line            Peripheral IV 10/17/19 Right Arm less than 1 day                Physical Exam   Constitutional: He is oriented to person, place, and time  He appears well-developed and well-nourished  HENT:   Head: Normocephalic and atraumatic  Eyes: EOM are normal    Neck: Neck supple  Cardiovascular: Regular rhythm and intact distal pulses  Pulmonary/Chest: Effort normal and breath sounds normal    Musculoskeletal:   See ortho exam   Neurological: He is alert and oriented to person, place, and time  Resting tremors   Skin: Skin is warm and dry  Psychiatric: He has a normal mood and affect  His behavior is normal  Judgment and thought content normal    Nursing note and vitals reviewed  Back Exam     Tenderness   The patient is experiencing no tenderness       Range of Motion   Extension: normal   Flexion: normal   Lateral bend right: normal   Lateral bend left: normal   Rotation right: normal   Rotation left: normal     Reflexes   Biceps: normal    Other   Sensation: normal  Erythema: no back redness  Scars: absent    Comments:  No neuropathic or myelopathic reflexes  Negative Spurling's test  No cervical spinal tenderness  Normal sensation to light touch C5 through T1  5/5  strength, wrist flexion-extension, elbow flexion-extension, and shoulder abduction strength  2+ radial pulse      Right Elbow Exam     Tenderness   The patient is experiencing tenderness in the olecranon fossa (posterior distal humerus, mild tenderness medial/lateral epicondyle)  Range of Motion   Extension:  0 normal   Flexion:  140 normal   Pronation:  0 normal   Supination:  140 normal     Muscle Strength   Pronation:  5/5   Supination:  5/5     Tests   Varus: negative  Valgus: negative  Tinel's sign (cubital tunnel): negative    Other   Erythema: absent  Scars: absent  Sensation: normal  Pulse: present    Comments:  FROM left elbow  5/5 strength flexion/extension/pronation/supination  No edema posterior elbow or olecranon bursa            Lab Results:   I have personally reviewed pertinent lab results  CBC:   Lab Results   Component Value Date    WBC 5 26 10/18/2019    HGB 12 5 10/18/2019    HCT 38 1 10/18/2019     (H) 10/18/2019     (L) 10/18/2019    MCH 33 2 10/18/2019    MCHC 32 8 10/18/2019    RDW 13 5 10/18/2019    MPV 12 0 10/18/2019    NRBC 0 10/18/2019     CMP:   Lab Results   Component Value Date    SODIUM 140 10/18/2019     10/18/2019    CO2 22 10/18/2019    BUN 49 (H) 10/18/2019    CREATININE 1 59 (H) 10/18/2019    CALCIUM 7 8 (L) 10/18/2019    AST 39 10/18/2019    ALT 44 10/18/2019    ALKPHOS 65 10/18/2019    EGFR 47 10/18/2019     PT/INR: No results found for: PT, INR  ESR: No results found for: ESR  CRP: No results found for: CRP     Imaging Studies: I have personally reviewed pertinent films in PACS  Xr Elbow 3+ Views Left    Result Date: 10/17/2019  FINDINGS: An impaction type fracture of the posterior cortex of the distal humerus noted proximal to the the ulnar olecranon  There is no joint effusion  No significant degenerative changes  No lytic or blastic lesions are seen  Soft tissue swelling overlies the ulnar olecranon  Impression: Nondisplaced cortical fracture distal humerus along its dorsal surface  Ct Cervical Spine Without Contrast    Result Date: 10/17/2019  FINDINGS: ALIGNMENT:  Mild reversal the cervical lordosis  Levoscoliotic curvature with the apex at the C6-7 level    Mild degenerative anterolisthesis of C3 on C4   VERTEBRAL BODIES:  No fracture  DEGENERATIVE CHANGES:  Disc space narrowing from C3-C4 to C6-C7 with calcified disc herniations and disc bulges resulting in mild to moderate spinal stenosis  The calcified disc extrusion with inferior migration at C4-C5 results in moderate spinal stenosis  There is multilevel endplate osteophytic ridging and uncovertebral hypertrophy with facet arthropathy resulting in variable degrees of foraminal stenosis, most notably on the right at C3-C4, C4-C5, C5-C6 and on the left at C6-C7  PREVERTEBRAL AND PARASPINAL SOFT TISSUES:  Unremarkable  THORACIC INLET:  Normal   Punctate left thyroid lobe calcification  Impression: No cervical spine fracture or traumatic malalignment  Scoliosis, spondylolisthesis and multilevel degenerative disease as described above  Moderate spinal stenosis at C4-C5  Dr Herson Auguste and I reviewed the available images his left elbow and cervical spine  The left elbow demonstrate a posterior nondisplaced impaction type fracture of the distal humerus, only visible on the lateral view  There is no other fracture, dislocation, lytic or blastic lesion, or loose body  We also reviewed the CT scan of his cervical spine which demonstrates no fracture or dislocation  There is multilevel degenerative disease and central canal stenosis at C4-C5      VTE Prophylaxis: Sequential compression device (Venodyne) , Plavix, ambulation    Code Status: Level 1 - Full Code  Advance Directive and Living Will:      Power of :    POLST:      Shanda Vega PA-C Orthopedics

## 2019-10-18 NOTE — SOCIAL WORK
Day 1, Not a MB    Explained role of CM to pt  Pt states he lives alone, was IPTA w/out device, takes public transportation, usually a bus to appointments, uses [facility] in [place] for his medications, does not have a LW or POA, declined information on both  CM asked pt if he thinks will need assist with ADL's due to L arm in a sling  Pt stated he thinks he will not have a problem and if he needs help he has a supportive son and a friend who live in the area and can assist if needed  CM briefly discussed pt drinking and out pt resources for alcohol  Pt declined resource information

## 2019-10-18 NOTE — UTILIZATION REVIEW
Initial Clinical Review    Admission: Date/Time/Statement:    Admission Orders (From admission, onward)     Ordered        10/17/19 1322  Place in Observation (expected length of stay for this patient is less than two midnights)  Once                   Orders Placed This Encounter   Procedures    Place in Observation (expected length of stay for this patient is less than two midnights)     Standing Status:   Standing     Number of Occurrences:   1     Order Specific Question:   Admitting Physician     Answer:   Lawerance Pod     Order Specific Question:   Level of Care     Answer:   Med Surg [[id number]]     ED Arrival Information     Expected Arrival Acuity Means of Arrival Escorted By Service Admission Type    - 10/17/2019 09:43 Urgent Walk-In Self General Medicine Urgent    Arrival Complaint    Fall;Elbow Injury;Shaky        Chief Complaint   Patient presents with    Weakness - Generalized     Pt c/o generalized weakness for months- getting worse  Assoc  with feeling lightheaded and dizzy, lucy Benton yesterday- sts just got out of bed and fell  Has been trying to cut back on his drinking because he thought that was causing his problem  Did drink this am   Pain back of neck from fall  Collar applied  Assessment/Plan:   64 year male that presents today with generalized weakness  Patient states for the past several months he has been getting very weak  States he gets lightheaded and dizzy  Whenever he gets up suddenly it gets worse  Patient states he fell yesterday he got out of bed and fell  He states he has neck pain along with elbow pain  Patient states he is trying to cut down on alcohol because he thinks that the problem  States he drinks about a pint of vodka daily  States shaky and cramps all over his legs and body  States last time he had alcohol was this morning  States the worsening that is happen with alcohol withdrawals shannon    Weakness  Assessment & Plan  Patient admits to generalized weakness for the past few months in his limbs  Patient fell by his bed yesterday with no sustained injuries  - CT head: No acute intracranial abnormality  Mild cerebral chronic microangiopathic disease  Mild inflammatory paranasal sinus disease  -CT abdomen: No radiopaque urinary tract calculi or obstructive uropathy  No acute intra-abdominal or intrapelvic process insofar as can be detected on a noncontrast CT  Colonic diverticulosis  Hepatic steatosis  Small bilateral adrenal nodules  Its imaging features are diagnostic of benign adrenal adenoma, and does not need further followup or workup  If there are clinical signs or symptoms of adrenal hyperfunction, biochemical evaluation may be appropriate  Moderate to severe degenerative changes in the lumbar spine   - Magnesium 1 5 on admission likely 2/2 to patient's alcohol abuse  - replenish Mg 2+  * Acute kidney injury superimposed on CKD ([facility] [address] )  Assessment & Plan  BUN/Cr on admission: 69/3 13 (baseline Cr 1 0); BP on admission 97/55  Patient had a previous admission in June 7, 2019 with hypotension and ENMA  - CK MB fraction 10 5  - Total   - IVF 100cc/hr  - trend BMP  -follow urine microscopic  -renal consult  Left humeral fracture  Assessment & Plan  X-ray left elbow: Nondisplaced cortical fracture distal humerus along its dorsal surface  Patient currently in a sling  -ortho consult  Hyponatremia  Assessment & Plan  CT abdomen and pelvis: Small bilateral adrenal nodules  Its imaging features are diagnostic of benign adrenal adenoma, and does not need further followup or workup   If there are clinical signs or symptoms of adrenal hyperfunction, biochemical evaluation may be appropriate   -Na+ on admission 130  -Normal saline 100 cc/hour  -Follow-up sodium  Alcohol abuse  Assessment & Plan  -Follow-up ETOH level  -B12, folate  -initiate [facility] protocol  -follow-up CMP  -fall precaution  -patient Education on drinking cessation    ED Triage Vitals Temperature Pulse Respirations Blood Pressure SpO2   10/17/19 1000 10/17/19 1000 10/17/19 1000 10/17/19 1000 10/17/19 1000   98 °F (36 7 °C) 60 16 97/53 92 %      Temp Source Heart Rate Source Patient Position - Orthostatic VS BP Location FiO2 (%)   10/17/19 1143 10/17/19 1143 10/17/19 1143 10/17/19 1143 --   Tympanic Monitor Lying Right arm       Pain Score       10/17/19 1000       3        Wt Readings from Last 1 Encounters:   10/17/19 92 4 kg (203 lb 11 3 oz)     Additional Vital Signs:   10/18/19 0211    64  18    92 %  None (Room air)     10/18/19 0012  98 3 °F (36 8 °C)  66  19  126/63  93 %    Lying   10/17/19 2043            None (Room air)     10/17/19 2002    75  20    93 %  None (Room air)     10/17/19 1930  98 7 °F (37 1 °C)  64  18  120/68  93 %  None (Room air)  Lying   Pertinent Labs/Diagnostic Test Results:   Results from last 7 days   Lab Units 10/18/19  0501 10/17/19  1016   WBC Thousand/uL 5 26 6 38   HEMOGLOBIN g/dL 12 5 12 8   HEMATOCRIT % 38 1 38 5   PLATELETS Thousands/uL 106* 124*   NEUTROS ABS Thousands/µL 2 92 3 97     Results from last 7 days   Lab Units 10/18/19  0501 10/17/19  1016   SODIUM mmol/L 140 130*   POTASSIUM mmol/L 4 0 4 1   CHLORIDE mmol/L 105 94*   CO2 mmol/L 22 21   ANION GAP mmol/L 13 15*   BUN mg/dL 49* 69*   CREATININE mg/dL 1 59* 3 13*   EGFR ml/min/1 73sq m 47 21   CALCIUM mg/dL 7 8* 7 9*   MAGNESIUM mg/dL 1 5* 1 5*   PHOSPHORUS mg/dL 3 2  --      Results from last 7 days   Lab Units 10/18/19  0501 10/17/19  1016   AST U/L 39  --    ALT U/L 44  --    ALK PHOS U/L 65  --    TOTAL PROTEIN g/dL 6 6  --    ALBUMIN g/dL 3 0*  --    TOTAL BILIRUBIN mg/dL 0 90  --    AMMONIA umol/L  --  <10*     Results from last 7 days   Lab Units 10/18/19  0606 10/18/19  0124 10/17/19  2149   POC GLUCOSE mg/dl 87 81 81     Results from last 7 days   Lab Units 10/18/19  0501 10/17/19  1016   GLUCOSE RANDOM mg/dL 81 98     Results from last 7 days   Lab Units 10/17/19  1016 CK TOTAL U/L 540*   CK MB INDEX % 1 9   CK MB ng/mL 10 5*     Results from last 7 days   Lab Units 10/17/19  2019   ETHANOL LVL mg/dL <3     L ELBOW XRAY=Nondisplaced cortical fracture distal humerus along its dorsal surface  CT HEAD=No acute intracranial abnormality  Mild cerebral chronic microangiopathic disease  Mild inflammatory paranasal sinus disease  CT CERVICAL SPINE=No cervical spine fracture or traumatic malalignment  Scoliosis, spondylolisthesis and multilevel degenerative disease as described above  Moderate spinal stenosis at C4-C5  CT A/P=No radiopaque urinary tract calculi or obstructive uropathy  No acute intra-abdominal or intrapelvic process insofar as can be detected on a noncontrast CT  Colonic diverticulosis  Hepatic steatosis  Small bilateral adrenal nodules  Its imaging features are diagnostic of benign adrenal adenoma, and does not need further followup or workup  If there are clinical signs or symptoms of adrenal hyperfunction, biochemical evaluation may be appropriate  Adrenal recommendation based on institutional consensus and Journal of [employer] of Radiology 2017;14:0838-7102  Moderate to severe degenerative changes in the lumbar spine  EKG=Rhythm: normal sinus  Intervals: normal intervals  Axis: normal axis  QRS/Blocks:normal QRS  ST Changes: No acute ST Changes, no STD/KAREN    ED Treatment:   Medication Administration from 10/17/2019 0943 to 10/17/2019 1421       Date/Time Order Dose Route     10/17/2019 1019 sodium chloride 0 9 % bolus 1,000 mL 1,000 mL Intravenous     10/17/2019 1149 magnesium sulfate IVPB (premix) SOLN 1 g 1 g Intravenous     10/17/2019 1313 thiamine (VITAMIN B1) 100 mg in sodium chloride 0 9 % 50 mL IVPB 100 mg Intravenous     78/37/4722 9119 folic acid 1 mg in sodium chloride 0 9 % 50 mL IVPB 1 mg Intravenous     10/17/2019 1239 sodium chloride 0 9 % bolus 1,000 mL 1,000 mL Intravenous        Past Medical History:   Diagnosis Date    Cancer [facility])     prostate ca,had radiation    Cardiac disease     stents,then triple bypass    [facility]     Diabetes mellitus ([facility] )     ETOH abuse     Hx of heart artery stent     2014    Hyperlipidemia     Hypertension     Prostate CA ([facility] )     Renal disorder     pyelonephritis    S/P CABG x 1     2004     Present on Admission:   COPD (chronic obstructive pulmonary disease) (HCC)   Type 2 diabetes mellitus (HCC)   Essential (primary) hypertension   Depression   HLD (hyperlipidemia)   CAD (coronary artery disease)   Acute kidney injury superimposed on CKD (HCC)   Nicotine abuse   Alcohol abuse   Weakness   Hypomagnesemia   Hyponatremia   Left humeral fracture   Cervical spinal stenosis  Admitting Diagnosis: Hypomagnesemia [E83 42]  Hyponatremia [E87 1]  Weakness [R53 1]  ENAM (acute kidney injury) ([facility] ) [N17 9]  Left humeral fracture [S42 302A]  Age/Sex: 64 y o  male  Admission Orders:  MED SURG  CIWA PROTOCOL  FALL PRECAUTIONS  CONSULT ORTHO  ACCUCHECKS WITH COVERAGE SCALE  FOOT PUMPS  CONSULT RENAL  Medications:  amLODIPine 5 mg Oral Daily   atorvastatin 40 mg Oral Daily With Dinner   clopidogrel 75 mg Oral Daily   famotidine 20 mg Oral BID   folic acid 1 mg Oral Daily   insulin lispro 1-6 Units Subcutaneous Q6H [address]   magnesium sulfate 2 g Intravenous Once   metoprolol succinate 100 mg Oral Daily   nicotine 1 patch Transdermal Daily   ranolazine 500 mg Oral BID   sertraline 100 mg Oral Daily   tamsulosin 0 4 mg Oral Daily   tiotropium 18 mcg Inhalation Daily     sodium chloride 100 mL/hr Intravenous Continuous     ipratropium-albuterol 3 mL Nebulization 4x Daily PRN     Network Utilization Review Department  Phone: [phone]; Fax [phone]  [email or web address]  ATTENTION: Please call with any questions or concerns to [phone]  and carefully listen to the prompts so that you are directed to the right person     Send all requests for admission clinical reviews, approved or denied determinations and any other requests to fax [phone]   All voicemails are confidential

## 2019-10-18 NOTE — CONSULTS
2           Consultation - Nephrology   Ladarius Ochoa 64 y o  male MRN: [id number]  Unit/Bed#: [address] [facility] Encounter: [id number]      Assessment/Plan     Assessment / Plan:  1  Renal    The patient has been having cramps at home and he came into the emergency room because he was weak kidney in pain and found have a creatinine of over 3  Overnight he was given IV fluids and his creatinine is already declined down to 1 6  Baseline is normal with a creatinine of around 1  I suspect obviously the patient had some volume depletion he told me that he drinks have a but was not that unusual so potentially he just became volume depleted  He has no history of underlying liver disease but that is always a possibility  No findings to suggest pancreatitis clinically or on CT scan imaging as I personally reviewed the report and there was no hydronephrosis as well  The patient does have some mild hypomagnesemia I spoke to the resident and it is okay to give magnesium repletion if run IV give at least over 4 hours to reduce urinary wasting because of magnesium is run too quickly the kidneys not very efficient reabsorbing it and the fraction excretion of magnesium into the urine will increase  Continue IV fluids  BMP a m  Creatinine should be to baseline    2  Hyponatremia    On admission sodium concentration is 130 this was likely due to volume depletion causing non osmotic ADH stimulation and also renal failure impairing ability to excrete water load it has improved to normal with saline repletion  History of Present Illness   Physician Requesting Consult: Guerrero Vera MD  Reason for Consult / Principal Problem:  Acute renal failure and hyponatremia  Hx and PE limited by:   HPI: Ladarius Ochoa is a 64y o  year old male who presents to the emergency room    When asked him why he came he says he was not feeling well and then talking a bit further he states he has been having bad cramps for over a week and was just in a lot of pain  He also relates to me that he drinks a lot  He came into the hospital and found have a creatinine 3 1 and sodium concentration 130 were asked to see him regarding these issues  History obtained from chart review and the patient  Consults    Review of Systems   Constitutional: Negative for chills and fever  HENT: Negative  Eyes: Negative  Respiratory: Negative  Negative for cough, chest tightness, shortness of breath and wheezing  Cardiovascular: Negative  Negative for chest pain, palpitations and leg swelling  Gastrointestinal: Negative  Negative for abdominal distention, abdominal pain, diarrhea, nausea and vomiting  Genitourinary: Negative  Negative for difficulty urinating, dysuria and hematuria  Musculoskeletal:        Leg cramps off and on for over a year  Neurological: Negative for dizziness, seizures, syncope and weakness  Psychiatric/Behavioral: Negative for agitation and behavioral problems         Historical Information   Patient Active Problem List   Diagnosis    [facility]chronic obstructive [facility] disease) ([facility] )    Right lower lobe pneumonia ([facility] )    Alcohol withdrawal syndrome without complication ([facility] )    [facility]    Essential (primary) hypertension    Hypomagnesemia    Depression    Hx of CABG    HLD (hyperlipidemia)    Alcohol withdrawal [facility]    History of prostate cancer    CAD (coronary artery disease)    Syncope and collapse    Acute kidney injury superimposed on CKD (HCC)    Elevated brain natriuretic peptide (BNP) level    Nicotine abuse    Thrombocytopenia (HCC)    Alcohol abuse    Weakness    Hyponatremia    Left humeral fracture    Cervical spinal stenosis     Past Medical History:   Diagnosis Date    Cancer [facility])     prostate ca,had radiation    Cardiac disease     stents,then triple bypass    COPD [facility]ronic [facility]     Diabetes mellitus [facility]     ETOH abuse     Hx of heart artery stent     2014    Hyperlipidemia     Hypertension     Prostate CA ([facility] [facility] )     Renal disorder     pyelonephritis    S/P CABG x 1     2004     Past Surgical History:   Procedure Laterality Date    CORONARY ARTERY BYPASS GRAFT  2004    TONSILLECTOMY       Social History   Social History     Substance and Sexual Activity   Alcohol Use Yes    Frequency: 4 or more times a week    Drinks per session: 10 or more    Comment: 1/5 of vodka per day     Social History     Substance and Sexual Activity   Drug Use No     Social History     Tobacco Use   Smoking Status Current Every Day Smoker    Packs/day: 1 50    Years: 40 00    Pack years: 60 00   Smokeless Tobacco Never Used     Family History   Problem Relation Age of Onset    Diabetes Mother     Uterine cancer Mother     COPD Father     Hypertension Father        Meds/Allergies   current meds:   Current Facility-Administered Medications   Medication Dose Route Frequency    amLODIPine (NORVASC) tablet 5 mg  5 mg Oral Daily    atorvastatin (LIPITOR) tablet 40 mg  40 mg Oral Daily With Dinner    clopidogrel (PLAVIX) tablet 75 mg  75 mg Oral Daily    famotidine (PEPCID) tablet 20 mg  20 mg Oral BID    folic acid (FOLVITE) tablet 1 mg  1 mg Oral Daily    insulin lispro (HumaLOG) 100 units/mL subcutaneous injection 1-6 Units  1-6 Units Subcutaneous Q6H [address]    ipratropium-albuterol (DUO-NEB) 0 5-2 5 mg/3 mL inhalation solution 3 mL  3 mL Nebulization 4x Daily PRN    magnesium sulfate 2 g/50 mL IVPB (premix) 2 g  2 g Intravenous Once    metoprolol succinate (TOPROL-XL) 24 hr tablet 100 mg  100 mg Oral Daily    nicotine (NICODERM CQ) 21 mg/24 hr TD 24 hr patch 1 patch  1 patch Transdermal Daily    ranolazine (RANEXA) 12 hr tablet 500 mg  500 mg Oral BID    sertraline (ZOLOFT) tablet 100 mg  100 mg Oral Daily    sodium chloride 0 9 % infusion  100 mL/hr Intravenous Continuous    tamsulosin (FLOMAX) capsule 0 4 mg  0 4 mg Oral Daily    tiotropium (SPIRIVA) capsule for inhaler 18 mcg  18 mcg Inhalation Daily     No Known Allergies    Objective     Intake/Output Summary (Last 24 hours) at 10/18/2019 0922  Last data filed at 10/18/2019 0356  Gross per 24 hour   Intake 2100 ml   Output 2550 ml   Net -450 ml     Body mass index is 26 15 kg/m²  Invasive Devices:        PHYSICAL EXAM:  /74 (BP Location: Left arm)   Pulse 80   Temp 97 8 °F (36 6 °C) (Temporal)   Resp 18   Ht 6' 2" (1 88 m)   Wt 92 4 kg (203 lb 11 3 oz)   SpO2 94%   BMI 26 15 kg/m²     Physical Exam   Constitutional: He is oriented to person, place, and time  No distress  Eyes: No scleral icterus  Neck: Neck supple  No JVD present  Cardiovascular: Normal rate and regular rhythm  Exam reveals no friction rub  No significant edema  Pulmonary/Chest: Effort normal and breath sounds normal  No respiratory distress  He has no wheezes  He has no rales  Abdominal: Soft  Bowel sounds are normal  He exhibits no distension  There is no tenderness  Neurological: He is alert and oriented to person, place, and time           Current Weight: Weight - Scale: 92 4 kg (203 lb 11 3 oz)  First Weight: Weight - Scale: 95 3 kg (210 lb)    Lab Results:    Results from last 7 days   Lab Units 10/18/19  0501   WBC Thousand/uL 5 26   HEMOGLOBIN g/dL 12 5   HEMATOCRIT % 38 1   PLATELETS Thousands/uL 106*     Results from last 7 days   Lab Units 10/18/19  0501   POTASSIUM mmol/L 4 0   CHLORIDE mmol/L 105   CO2 mmol/L 22   BUN mg/dL 49*   CREATININE mg/dL 1 59*   CALCIUM mg/dL 7 8*     Results from last 7 days   Lab Units 10/18/19  0501   POTASSIUM mmol/L 4 0   CHLORIDE mmol/L 105   CO2 mmol/L 22   BUN mg/dL 49*   CREATININE mg/dL 1 59*   CALCIUM mg/dL 7 8*   ALK PHOS U/L 65   ALT U/L 44   AST U/L 39

## 2019-10-18 NOTE — PLAN OF CARE
Problem: PAIN - ADULT  Goal: Verbalizes/displays adequate comfort level or baseline comfort level  Description  Interventions:  - Encourage patient to monitor pain and request assistance  - Assess pain using appropriate pain scale  - Administer analgesics based on type and severity of pain and evaluate response  - Implement non-pharmacological measures as appropriate and evaluate response  - Consider cultural and social influences on pain and pain management  - Notify physician/advanced practitioner if interventions unsuccessful or patient reports new pain  Outcome: Progressing     Problem: SAFETY ADULT  Goal: Patient will remain free of falls  Description  INTERVENTIONS:  - Assess patient frequently for physical needs  -  Identify cognitive and physical deficits and behaviors that affect risk of falls    -  [place] fall precautions as indicated by assessment   - Educate patient/family on patient safety including physical limitations  - Instruct patient to call for assistance with activity based on assessment  - Modify environment to reduce risk of injury  - Consider OT/PT consult to assist with strengthening/mobility  Outcome: Progressing  Goal: Maintain or return to baseline ADL function  Description  INTERVENTIONS:  -  Assess patient's ability to carry out ADLs; assess patient's baseline for ADL function and identify physical deficits which impact ability to perform ADLs (bathing, care of mouth/teeth, toileting, grooming, dressing, etc )  - Assess/evaluate cause of self-care deficits   - Assess range of motion  - Assess patient's mobility; develop plan if impaired  - Assess patient's need for assistive devices and provide as appropriate  - Encourage maximum independence but intervene and supervise when necessary  - Involve family in performance of ADLs  - Assess for home care needs following discharge   - Consider OT consult to assist with ADL evaluation and planning for discharge  - Provide patient education as appropriate  Outcome: Progressing  Goal: Maintain or return mobility status to optimal level  Description  INTERVENTIONS:  - Assess patient's baseline mobility status (ambulation, transfers, stairs, etc )    - Identify cognitive and physical deficits and behaviors that affect mobility  - Identify mobility aids required to assist with transfers and/or ambulation (gait belt, sit-to-stand, lift, walker, cane, etc )  - [place] fall precautions as indicated by assessment  - Record patient progress and toleration of activity level on Mobility SBAR; progress patient to next Phase/Stage  - Instruct patient to call for assistance with activity based on assessment  - Consider rehabilitation consult to assist with strengthening/weightbearing, etc   Outcome: Progressing     Problem: DISCHARGE PLANNING  Goal: Discharge to home or other facility with appropriate resources  Description  INTERVENTIONS:  - Identify barriers to discharge w/patient and caregiver  - Arrange for needed discharge resources and transportation as appropriate  - Identify discharge learning needs (meds, wound care, etc )  - Arrange for interpretive services to assist at discharge as needed  - Refer to Case Management Department for coordinating discharge planning if the patient needs post-hospital services based on physician/advanced practitioner order or complex needs related to functional status, cognitive ability, or social support system  Outcome: Progressing     Problem: Knowledge Deficit  Goal: Patient/family/caregiver demonstrates understanding of disease process, treatment plan, medications, and discharge instructions  Description  Complete learning assessment and assess knowledge base    Interventions:  - Provide teaching at level of understanding  - Provide teaching via preferred learning methods  Outcome: Progressing     Problem: RESPIRATORY - ADULT  Goal: Achieves optimal ventilation and oxygenation  Description  INTERVENTIONS:  - Assess for changes in respiratory status  - Assess for changes in mentation and behavior  - Position to facilitate oxygenation and minimize respiratory effort  - Oxygen administered by appropriate delivery if ordered  - Initiate smoking cessation education as indicated  - Encourage broncho-pulmonary hygiene including cough, deep breathe, Incentive Spirometry  - Assess the need for suctioning and aspirate as needed  - Assess and instruct to report SOB or any respiratory difficulty  - Respiratory Therapy support as indicated  Outcome: Progressing     Problem: Potential for Falls  Goal: Patient will remain free of falls  Description  INTERVENTIONS:  - Assess patient frequently for physical needs  -  Identify cognitive and physical deficits and behaviors that affect risk of falls    -  [place] fall precautions as indicated by assessment   - Educate patient/family on patient safety including physical limitations  - Instruct patient to call for assistance with activity based on assessment  - Modify environment to reduce risk of injury  - Consider OT/PT consult to assist with strengthening/mobility  Outcome: Progressing

## 2019-10-18 NOTE — RESPIRATORY THERAPY NOTE
RT Protocol Note  Ashby Cushing 64 y o  male MRN: [id number]  Unit/Bed#: [address] [place] [address] Encounter: [id number]    Assessment    Principal Problem:    Acute kidney injury superimposed on CKD [facility])  Active Problems:    COPD (chronic obstructive pulmonary disease) ([place] [address] )    Type 2 diabetes mellitus ([place] [address] )    Essential (primary) hypertension    Hypomagnesemia    Depression    Hx of CABG    HLD (hyperlipidemia)    History of prostate cancer    CAD (coronary artery disease)    Nicotine abuse    Alcohol abuse    Weakness    Hyponatremia    Left humeral fracture    Cervical spinal stenosis      Home Pulmonary Medications:  Pt just received nebulizer at [facility], has not used it yet       Past Medical History:   Diagnosis Date    Cancer [facility])     prostate ca,had radiation    Cardiac disease     stents,then triple bypass    COPD (chronic obstructive pulmonary disease) ([place] [address] )     Diabetes mellitus ([place] [address] )     ETOH abuse     Hx of heart artery stent     2014    Hyperlipidemia     Hypertension     Prostate CA ([place] [address] )     Renal disorder     pyelonephritis    S/P CABG x 1     2004     Social History     Socioeconomic History    Marital status: Single     Spouse name: None    Number of children: None    Years of education: None    Highest education level: None   Occupational History    None   Social Needs    Financial resource strain: None    Food insecurity:     Worry: None     Inability: None    Transportation needs:     Medical: None     Non-medical: None   Tobacco Use    Smoking status: Current Every Day Smoker     Packs/day: 1 50     Years: 40 00     Pack years: 60 00    Smokeless tobacco: Never Used   Substance and Sexual Activity    Alcohol use: Yes     Frequency: 4 or more times a week     Drinks per session: 10 or more     Comment: 1/5 of vodka per day    Drug use: No    Sexual activity: Not Currently   Lifestyle    Physical activity:     Days per week: None     Minutes per session: None    Stress: None Relationships    Social connections:     Talks on phone: None     Gets together: None     Attends Druze service: None     Active member of club or organization: None     Attends meetings of clubs or organizations: None     Relationship status: None    Intimate partner violence:     Fear of current or ex partner: None     Emotionally abused: None     Physically abused: None     Forced sexual activity: None   Other Topics Concern    None   Social History Narrative    None       Subjective         Objective    Physical Exam:   Assessment Type: Assess only  General Appearance: Awake, Alert  Respiratory Pattern: Normal  Chest Assessment: Chest expansion symmetrical  Bilateral Breath Sounds: Diminished  Cough: None    Vitals:  Blood pressure 120/68, pulse 75, temperature 98 7 °F (37 1 °C), temperature source Oral, resp  rate 20, height 6' 2" (1 88 m), weight 92 4 kg (203 lb 11 3 oz), SpO2 93 %                    Plan    Respiratory Plan: Home Bronchodilator Patient pathway        Resp Comments: Pt denies distress at this time- will let RN know if he wants neb

## 2019-10-18 NOTE — ASSESSMENT & PLAN NOTE
- CT cervical spine w/o contrast showed moderate spinal stenosis at C4-C5  - Ortho consulted, recommend conservative treatment with physical therapy without restriction

## 2019-10-19 VITALS
HEIGHT: 74 IN | DIASTOLIC BLOOD PRESSURE: 86 MMHG | SYSTOLIC BLOOD PRESSURE: 177 MMHG | HEART RATE: 65 BPM | RESPIRATION RATE: 18 BRPM | TEMPERATURE: 97.7 F | WEIGHT: 203.71 LBS | BODY MASS INDEX: 26.14 KG/M2 | OXYGEN SATURATION: 97 %

## 2019-10-19 LAB
ALBUMIN SERPL BCP-MCNC: 3 G/DL (ref 3.5–5)
ALP SERPL-CCNC: 65 U/L (ref 46–116)
ALT SERPL W P-5'-P-CCNC: 44 U/L (ref 12–78)
ANION GAP SERPL CALCULATED.3IONS-SCNC: 11 MMOL/L (ref 4–13)
AST SERPL W P-5'-P-CCNC: 42 U/L (ref 5–45)
BASOPHILS # BLD AUTO: 0.06 THOUSANDS/ΜL (ref 0–0.1)
BASOPHILS NFR BLD AUTO: 1 % (ref 0–1)
BILIRUB SERPL-MCNC: 0.7 MG/DL (ref 0.2–1)
BUN SERPL-MCNC: 28 MG/DL (ref 5–25)
CALCIUM SERPL-MCNC: 8.3 MG/DL (ref 8.3–10.1)
CHLORIDE SERPL-SCNC: 104 MMOL/L (ref 100–108)
CO2 SERPL-SCNC: 23 MMOL/L (ref 21–32)
CREAT SERPL-MCNC: 0.97 MG/DL (ref 0.6–1.3)
EOSINOPHIL # BLD AUTO: 0.11 THOUSAND/ΜL (ref 0–0.61)
EOSINOPHIL NFR BLD AUTO: 2 % (ref 0–6)
ERYTHROCYTE [DISTWIDTH] IN BLOOD BY AUTOMATED COUNT: 13.3 % (ref 11.6–15.1)
GFR SERPL CREATININE-BSD FRML MDRD: 86 ML/MIN/1.73SQ M
GLUCOSE SERPL-MCNC: 112 MG/DL (ref 65–140)
GLUCOSE SERPL-MCNC: 114 MG/DL (ref 65–140)
GLUCOSE SERPL-MCNC: 84 MG/DL (ref 65–140)
GLUCOSE SERPL-MCNC: 89 MG/DL (ref 65–140)
HCT VFR BLD AUTO: 36.6 % (ref 36.5–49.3)
HEMOCCULT STL QL: POSITIVE
HGB BLD-MCNC: 11.9 G/DL (ref 12–17)
IMM GRANULOCYTES # BLD AUTO: 0.02 THOUSAND/UL (ref 0–0.2)
IMM GRANULOCYTES NFR BLD AUTO: 0 % (ref 0–2)
LYMPHOCYTES # BLD AUTO: 1.2 THOUSANDS/ΜL (ref 0.6–4.47)
LYMPHOCYTES NFR BLD AUTO: 23 % (ref 14–44)
MAGNESIUM SERPL-MCNC: 1.3 MG/DL (ref 1.6–2.6)
MCH RBC QN AUTO: 33.1 PG (ref 26.8–34.3)
MCHC RBC AUTO-ENTMCNC: 32.5 G/DL (ref 31.4–37.4)
MCV RBC AUTO: 102 FL (ref 82–98)
MONOCYTES # BLD AUTO: 0.99 THOUSAND/ΜL (ref 0.17–1.22)
MONOCYTES NFR BLD AUTO: 19 % (ref 4–12)
MRSA NOSE QL CULT: NORMAL
NEUTROPHILS # BLD AUTO: 2.83 THOUSANDS/ΜL (ref 1.85–7.62)
NEUTS SEG NFR BLD AUTO: 55 % (ref 43–75)
NRBC BLD AUTO-RTO: 0 /100 WBCS
PHOSPHATE SERPL-MCNC: 1.8 MG/DL (ref 2.7–4.5)
PLATELET # BLD AUTO: 104 THOUSANDS/UL (ref 149–390)
PMV BLD AUTO: 12.2 FL (ref 8.9–12.7)
POTASSIUM SERPL-SCNC: 4.1 MMOL/L (ref 3.5–5.3)
PROT SERPL-MCNC: 6.4 G/DL (ref 6.4–8.2)
RBC # BLD AUTO: 3.59 MILLION/UL (ref 3.88–5.62)
SODIUM SERPL-SCNC: 138 MMOL/L (ref 136–145)
WBC # BLD AUTO: 5.21 THOUSAND/UL (ref 4.31–10.16)

## 2019-10-19 PROCEDURE — 94760 N-INVAS EAR/PLS OXIMETRY 1: CPT

## 2019-10-19 PROCEDURE — 80053 COMPREHEN METABOLIC PANEL: CPT | Performed by: STUDENT IN AN ORGANIZED HEALTH CARE EDUCATION/TRAINING PROGRAM

## 2019-10-19 PROCEDURE — 83735 ASSAY OF MAGNESIUM: CPT | Performed by: STUDENT IN AN ORGANIZED HEALTH CARE EDUCATION/TRAINING PROGRAM

## 2019-10-19 PROCEDURE — 99232 SBSQ HOSP IP/OBS MODERATE 35: CPT | Performed by: INTERNAL MEDICINE

## 2019-10-19 PROCEDURE — 84100 ASSAY OF PHOSPHORUS: CPT | Performed by: STUDENT IN AN ORGANIZED HEALTH CARE EDUCATION/TRAINING PROGRAM

## 2019-10-19 PROCEDURE — 99239 HOSP IP/OBS DSCHRG MGMT >30: CPT | Performed by: FAMILY MEDICINE

## 2019-10-19 PROCEDURE — 85025 COMPLETE CBC W/AUTO DIFF WBC: CPT | Performed by: STUDENT IN AN ORGANIZED HEALTH CARE EDUCATION/TRAINING PROGRAM

## 2019-10-19 PROCEDURE — 82948 REAGENT STRIP/BLOOD GLUCOSE: CPT

## 2019-10-19 RX ORDER — MAGNESIUM SULFATE HEPTAHYDRATE 40 MG/ML
2 INJECTION, SOLUTION INTRAVENOUS ONCE
Status: COMPLETED | OUTPATIENT
Start: 2019-10-19 | End: 2019-10-19

## 2019-10-19 RX ORDER — METOPROLOL SUCCINATE 100 MG/1
100 TABLET, EXTENDED RELEASE ORAL DAILY
Qty: 90 TABLET | Refills: 3 | Status: ON HOLD | OUTPATIENT
Start: 2019-10-20 | End: 2019-11-25 | Stop reason: SDUPTHER

## 2019-10-19 RX ADMIN — TIOTROPIUM BROMIDE 18 MCG: 18 CAPSULE ORAL; RESPIRATORY (INHALATION) at 09:00

## 2019-10-19 RX ADMIN — FAMOTIDINE 20 MG: 20 TABLET ORAL at 08:58

## 2019-10-19 RX ADMIN — TAMSULOSIN HYDROCHLORIDE 0.4 MG: 0.4 CAPSULE ORAL at 08:59

## 2019-10-19 RX ADMIN — AMLODIPINE BESYLATE 5 MG: 5 TABLET ORAL at 08:58

## 2019-10-19 RX ADMIN — SODIUM PHOSPHATE, MONOBASIC, MONOHYDRATE 21 MMOL: 276; 142 INJECTION, SOLUTION INTRAVENOUS at 10:57

## 2019-10-19 RX ADMIN — METOPROLOL SUCCINATE 100 MG: 100 TABLET, EXTENDED RELEASE ORAL at 08:58

## 2019-10-19 RX ADMIN — RANOLAZINE 500 MG: 500 TABLET, FILM COATED, EXTENDED RELEASE ORAL at 08:58

## 2019-10-19 RX ADMIN — Medication 1 TABLET: at 08:59

## 2019-10-19 RX ADMIN — FOLIC ACID 1 MG: 1 TABLET ORAL at 08:59

## 2019-10-19 RX ADMIN — MAGNESIUM SULFATE HEPTAHYDRATE 2 G: 40 INJECTION, SOLUTION INTRAVENOUS at 09:06

## 2019-10-19 RX ADMIN — NICOTINE 1 PATCH: 21 PATCH, EXTENDED RELEASE TRANSDERMAL at 09:00

## 2019-10-19 RX ADMIN — SODIUM CHLORIDE 100 ML/HR: 0.9 INJECTION, SOLUTION INTRAVENOUS at 03:36

## 2019-10-19 RX ADMIN — CLOPIDOGREL BISULFATE 75 MG: 75 TABLET ORAL at 08:58

## 2019-10-19 RX ADMIN — SERTRALINE HYDROCHLORIDE 100 MG: 100 TABLET ORAL at 08:59

## 2019-10-19 NOTE — PROGRESS NOTES
NEPHROLOGY PROGRESS NOTE    Teddy Tyler 64 y o  male MRN: [id number]  Unit/Bed#: [address] [place] [address] Encounter: [id number]  Reason for Consult:  Acute renal failure    Patient is awake and alert in no distress ate his meal and is anxious for discharge other than that feels well no complaints  ASSESSMENT/PLAN:  1  Renal    Patient acute renal failure with creatinine over 3  With IV fluids patient's creatinine now has declined back to baseline which is normal   This point there is no active renal issue can discontinue IV fluids and stable from a renal standpoint for discharge  Given his alcohol use he likely became pre renal so I encouraged him really to take care of self trying reduce drinking and make sure he eats  Follow-up with his family physician  Discontinue IV fluids  No need for Nephrology follow-up at this point  SUBJECTIVE:  Review of Systems   Constitution: Negative for chills and fever  HENT: Negative  Eyes: Negative  Cardiovascular: Negative  Negative for chest pain, dyspnea on exertion, leg swelling and orthopnea  Respiratory: Negative  Negative for cough, shortness of breath and wheezing  Gastrointestinal: Negative  Negative for abdominal pain, diarrhea, nausea and vomiting  Genitourinary: Negative  Neurological: Negative  Psychiatric/Behavioral: Negative for altered mental status  OBJECTIVE:  Current Weight: Weight - Scale: 92 4 kg (203 lb 11 3 oz)  Vitals:Temp (24hrs), Av 7 °F (36 5 °C), Min:97 °F (36 1 °C), Max:98 °F (36 7 °C)  Current: Temperature: 97 7 °F (36 5 °C)   Blood pressure (!) 172/83, pulse 57, temperature 97 7 °F (36 5 °C), temperature source Tympanic, resp  rate 20, height 6' 2" (1 88 m), weight 92 4 kg (203 lb 11 3 oz), SpO2 93 %  , Body mass index is 26 15 kg/m²        Intake/Output Summary (Last 24 hours) at 10/19/2019 1356  Last data filed at 10/19/2019 1101  Gross per 24 hour   Intake    Output 2850 ml   Net -2850 ml       Physical Exam: BP (!) 172/83 (BP Location: Left arm)   Pulse 57   Temp 97 7 °F (36 5 °C) (Tympanic)   Resp 20   Ht 6' 2" (1 88 m)   Wt 92 4 kg (203 lb 11 3 oz)   SpO2 93%   BMI 26 15 kg/m²   Physical Exam   Constitutional: He is oriented to person, place, and time  No distress  Eyes: No scleral icterus  Neck: Neck supple  No JVD present  Cardiovascular: Normal rate and regular rhythm  Exam reveals no gallop and no friction rub  Pulmonary/Chest: Effort normal and breath sounds normal  No respiratory distress  He has no wheezes  He has no rales  Abdominal: Soft  Bowel sounds are normal  He exhibits no distension  There is no tenderness  Neurological: He is alert and oriented to person, place, and time  Psychiatric: He has a normal mood and affect         Medications:    Current Facility-Administered Medications:     amLODIPine (NORVASC) tablet 5 mg, 5 mg, Oral, Daily, Damian Andrade DO, 5 mg at 10/19/19 0858    atorvastatin (LIPITOR) tablet 40 mg, 40 mg, Oral, Daily With Vaishali Farmington, DO, 40 mg at 10/18/19 1612    clopidogrel (PLAVIX) tablet 75 mg, 75 mg, Oral, Daily, Damian Andrade, DO, 75 mg at 10/19/19 0858    famotidine (PEPCID) tablet 20 mg, 20 mg, Oral, BID, Damian Andrade, DO, 20 mg at 39/81/32 7550    folic acid (FOLVITE) tablet 1 mg, 1 mg, Oral, Daily, Damian Andrade, DO, 1 mg at 10/19/19 0859    insulin lispro (HumaLOG) 100 units/mL subcutaneous injection 1-6 Units, 1-6 Units, Subcutaneous, Q6H [address] **AND** Fingerstick Glucose (POCT), , , Q6H, Rhea Yadav DO    ipratropium-albuterol (DUO-NEB) 0 5-2 5 mg/3 mL inhalation solution 3 mL, 3 mL, Nebulization, 4x Daily PRN, Damian Andrade DO    metoprolol succinate (TOPROL-XL) 24 hr tablet 100 mg, 100 mg, Oral, Daily, Damian Andrade, DO, 100 mg at 10/19/19 0858    multivitamin-minerals (CENTRUM) tablet 1 tablet, 1 tablet, Oral, Daily, Claudette Sabine, MD, 1 tablet at 10/19/19 0859    nicotine (NICODERM CQ) 21 mg/24 hr TD 24 hr patch 1 patch, 1 patch, Transdermal, Daily, Damian Andrade DO, 1 patch at 10/19/19 0900    ranolazine (RANEXA) 12 hr tablet 500 mg, 500 mg, Oral, BID, Damian Andrade DO, 500 mg at 10/19/19 0858    sertraline (ZOLOFT) tablet 100 mg, 100 mg, Oral, Daily, Damian Andrade DO, 100 mg at 10/19/19 0859    sodium phosphate 21 mmol in dextrose 5 % 250 mL Infusion, 21 mmol, Intravenous, Once, Kevin Richardson DO, Last Rate: 62 5 mL/hr at 10/19/19 1057, 21 mmol at 10/19/19 1057    tamsulosin (FLOMAX) capsule 0 4 mg, 0 4 mg, Oral, Daily, Damian Andrade DO, 0 4 mg at 10/19/19 0859    tiotropium (SPIRIVA) capsule for inhaler 18 mcg, 18 mcg, Inhalation, Daily, Damian Andrade DO, 18 mcg at 10/19/19 0900    Laboratory Results:  Lab Results   Component Value Date    WBC 5 21 10/19/2019    HGB 11 9 (L) 10/19/2019    HCT 36 6 10/19/2019     (H) 10/19/2019     (L) 10/19/2019     Lab Results   Component Value Date    SODIUM 138 10/19/2019    K 4 1 10/19/2019     10/19/2019    CO2 23 10/19/2019    BUN 28 (H) 10/19/2019    CREATININE 0 97 10/19/2019    GLUC 84 10/19/2019    CALCIUM 8 3 10/19/2019     Lab Results   Component Value Date    CALCIUM 8 3 10/19/2019    PHOS 1 8 (L) 10/19/2019     No results found for: LABPROT

## 2019-10-19 NOTE — DISCHARGE SUMMARY
Discharge- Jon Carrillo 1960, 64 y o  male MRN: [id number]    Unit/Bed#: [address] [place] [address] Encounter: [id number]    Primary Care Provider: Sophia Junior MD   Date and time admitted to hospital: 10/17/2019  9:45 AM        * Acute kidney injury superimposed on CKD [facility])  Assessment & Plan  BUN/Cr on admission: 69/3 13 (baseline Cr 1 0); BP on admission 97/55  Patient had a previous admission in June 7, 2019 with hypotension and ENMA  - On admission total  CK MB fraction 10 5  - Continue IVF 100cc/hr  - Creatinine improved 3 13 -> 1 59 --> 0 97  - Nephro consulted, suspected volume depletion  Weakness  Assessment & Plan  Patient admits to generalized weakness for the past few months in his limbs  Patient fell by his bed yesterday with no sustained injuries  - CT head: No acute intracranial abnormality  Mild cerebral chronic microangiopathic disease  Mild inflammatory paranasal sinus disease  -CT abdomen: No radiopaque urinary tract calculi or obstructive uropathy  No acute intra-abdominal or intrapelvic process insofar as can be detected on a noncontrast CT  Colonic diverticulosis  Hepatic steatosis  Small bilateral adrenal nodules  Its imaging features are diagnostic of benign adrenal adenoma, and does not need further followup or workup  If there are clinical signs or symptoms of adrenal hyperfunction, biochemical evaluation may be appropriate  Moderate to severe degenerative changes in the lumbar spine   - Magnesium 1 5 on admission likely 2/2 to patient's alcohol abuse  - Nephrology consulted, stated it is safe to replenish Mg 2+  - Patient given magnesium sulfate 2 g/50 mL IVPB (premix) supplementation      COPD (chronic obstructive pulmonary disease) ([facility])  Assessment & Plan  -DuoNeb p r n  Q 4h, DuoNeb q 6 resp  -Continue with Breo Ellipta in place of home Spiriva  -O2 p r n      Type 2 diabetes mellitus [facility])  Assessment & Plan  Lab Results   Component Value Date    HGBA1C 5 4 06/07/2019       No results for input(s): POCGLU in the last 72 hours  Blood Sugar Average: Last 72 hrs:     -POC glucose on admission 98  -ISS    Essential (primary) hypertension  Assessment & Plan  BP on admission is 119/59  Stable  Continue with home medication Norvasc 5 mg tablet p o  Q d  Monopril 10 mg p o  Q d  Metoprolol succinate 100 mg p o  Q d  Monitor BP    Hypomagnesemia  Assessment & Plan  - Magnesium 1 5 on admission   - Patient given magnesium sulfate 2 g/50 mL IVPB (premix) x 2 during hospitalization  - follow up Magnesium level in outpatient    Depression  Assessment & Plan  Continue with Zoloft 100 mg tablet p o  Q d  Hx of CABG  Assessment & Plan      History of CABG in 2004  Stent in 2014  Patient's cardiologist from [place], [place]  HLD (hyperlipidemia)  Assessment & Plan  HDL 59, , TG 75  Stable    History of prostate cancer  Assessment & Plan  Status post resection and radiation on remission  CAD (coronary artery disease)  Assessment & Plan  - Continue home medication amlodipine 5 mg p o  Q d , Plavix 75 mg p o  Q d , metoprolol succinate 100 mg p o  Q d , ranolazine 500 mg p o  B i d   -f/u EKG showed sinus bradycardia and possible left atrial enlargement    Alcohol abuse  Assessment & Plan  - Folate and B12 WNL  - Patient received thiamine, B12 and folate supplements  - EtOH level <3 at 10/17/19 20:19  - continue CIWA protocol  - CMP showed AST/ALT WNL  - fall precaution  - patient Education on drinking cessation    Hyponatremia  Assessment & Plan  CT abdomen and pelvis: Small bilateral adrenal nodules  Its imaging features are diagnostic of benign adrenal adenoma, and does not need further followup or workup   If there are clinical signs or symptoms of adrenal hyperfunction, biochemical evaluation may be appropriate      - Na+ on admission 130 improved to 140 today  - Received normal saline 100 cc/hour    Nicotine abuse  Assessment & Plan  Nicotine patch  Patient education on smoking cessation    Left humeral fracture  Assessment & Plan  X-ray left elbow: Nondisplaced cortical fracture distal humerus along its dorsal surface   - Ortho consulted and will treat conservatively with immobilization and re-evaluation 1 week  A posterior splint was applied to his left elbow to keep him in neutral position and a sling was applied  He should keep the sling and splint in place for 1 week until he is re-evaluated  He should be nonweightbearing in the left upper extremity  Cervical spinal stenosis  Assessment & Plan  - Ortho consulted, recommend conservative treatment with physical therapy without restriction      Admitting Provider: Ken Vidal MD  Discharge Provider: Doc Knott DO  Discharge To: Home  Facility: None    Outpatient Follow-Up: PCP within 1-2 weeks of discharge  Things to address at first follow up visit: 1  Renal function 2  CK level 3  Respiratory status 3  Electrolyte levels - hyponatremia, Hypomagnesemia 4  Left humeral fracture  Labs and results pending at discharge: none    Admission Date: 10/17/2019     Discharge Date: 10/19/19  Primary Discharge Diagnosis  Principal Problem:    Acute kidney injury superimposed on CKD [facility])  Active Problems:    Weakness    COPD (chronic obstructive pulmonary disease) ([facility])    Type 2 diabetes mellitus ([facility])    Essential (primary) hypertension    Hypomagnesemia    Depression    Hx of CABG    HLD (hyperlipidemia)    History of prostate cancer    CAD (coronary artery disease)    Alcohol abuse    Hyponatremia    Nicotine abuse    Left humeral fracture    Cervical spinal stenosis    ENMA (acute kidney injury) ([facility] [address] )  Resolved Problems:    * No resolved hospital problems   *      Consulting Providers   Nephrology - Deann Mcgee MD  [address], DO and Cammie Valero PA-C        [address] [place]    Procedures Performed/Pertinent Test results  CT elbow left w/o contrast on 10/18/19: IMPRESSION:     No acute fracture or dislocation      Old partially ununited nondisplaced avulsion fracture of the radial aspect of the coronoid process      Mild medial compartment osteoarthritis  CT abdomen pelvis w/o contrast on 10/17/19: IMPRESSION:     No radiopaque urinary tract calculi or obstructive uropathy      No acute intra-abdominal or intrapelvic process insofar as can be detected on a noncontrast CT      Colonic diverticulosis      Hepatic steatosis      Small bilateral adrenal nodules  Its imaging features are diagnostic of benign adrenal adenoma, and does not need further followup or workup  If there are clinical signs or symptoms of adrenal hyperfunction, biochemical evaluation may be appropriate       Adrenal recommendation based on institutional consensus and Journal of [employer] of Radiology 2017;14:2968-2978     Moderate to severe degenerative changes in the lumbar spine  CT spine cervical w/o contrast on 10/17/19: IMPRESSION:     No cervical spine fracture or traumatic malalignment      Scoliosis, spondylolisthesis and multilevel degenerative disease as described above  Moderate spinal stenosis at C4-C5  CT head w/o contrast on 10/17/19: IMPRESSION:     No acute intracranial abnormality        Mild cerebral chronic microangiopathic disease      Mild inflammatory paranasal sinus disease  X-ray left elbow on 10/17/19: IMPRESSION:     Nondisplaced cortical fracture distal humerus along its dorsal surface        LITO Cao is a 64 y o  male with history of HTN, CAD, T2DM, COPD, EtOH abuse, tobacco abuse, and prostate cancer in remission who presents with tremors and b/l leg cramping  Patient states that yesterday morning he woke up and fell out of his bed, injuring his neck and left elbow  He denies any prodromal symptoms including lightheadedness or vertigo   He came to the ED this morning with increasing tremors and muscle cramping in his legs and stated that whenever he doesn't drink he "gets shaky in his arms and legs"  The muscle cramping is a chronic issue that is mostly unchanged from baseline per the patient  He denies any history of withdrawal seizures  His last drink was this morning, one vodka & tea  His substance use history is significant for drinking a "5th of vodka" everyday and 1 PPD cigarettes for the past 40 years  He denies any illicit drug use  He denies any headache, vision changes, CP, SOB, abdominal pain       Patient had a similar presentation on 6/7/2019 where he was admitted for syncope and was found to be hypotensive with an ENMA  Hospital Course    Patient was admitted on October 17 2019 secondary to acute kidney injury  Patient fell out of bed day before admission and endorse past few months of generalized weakness in his limbs  CT head was done without acute intracranial abnormality  CK was mildly elevated at 540 on admission  Creatinine was elevated on initial 3 13  Patient was given normal saline IVF  Creatine and eventually trended down back to baseline at 0 97 on day of discharge  Patient also sustained a nondisplaced cortical fracture of distal humerus  Ortho was consulted  A posterior splint was applied to his left elbow  Patient also found to have mild hyponatremia  After IV fluid hydration sodium returned to normal range  Patient was also found to have hypomagnesemia during hospitalization  Patient received IV magnesium supplementation and he is advised to take oral magnesium oxide upon discharge  Patient remained afebrile and hemodynamically stable throughout hospitalization  Patient is stable for discharge  Patient is instructed to follow up with PCP within 1-2 weeks of discharge  Please see above for details assessment and plan  Physical Exam at Discharge    Physical Exam   Constitutional: He is oriented to person, place, and time  He appears well-developed  No distress     HENT:   Head: Normocephalic  Mouth/Throat: Oropharynx is clear and moist    Eyes: Pupils are equal, round, and reactive to light  Right eye exhibits no discharge  Left eye exhibits no discharge  No scleral icterus  Neck: Normal range of motion  Cardiovascular: Normal rate, regular rhythm and normal heart sounds  No murmur heard  Pulmonary/Chest: Effort normal and breath sounds normal  No respiratory distress  He has no wheezes  Abdominal: Soft  Bowel sounds are normal  He exhibits no distension  There is no tenderness  Musculoskeletal: Normal range of motion  Tenderness with extension and internal rotation of left elbow  Tenderness on palpation of distal humerus  Neurological: He is alert and oriented to person, place, and time  Skin: Skin is warm  He is not diaphoretic  Psychiatric: He has a normal mood and affect  Nursing note and vitals reviewed        Medications     TAKE these medications     TAKE these medications     Morning Afternoon Evening Bedtime As Needed    albuterol 90 mcg/act inhaler   Commonly known as: PROVENTIL HFA,VENTOLIN HFA   Inhale 2 puffs every 4 (four) hours as needed for wheezing   Refills: 0          amLODIPine 5 mg tablet   Commonly known as: NORVASC   Take 5 mg by mouth daily   Refills: 0  10/20 am         clopidogrel 75 mg tablet   Commonly known as: PLAVIX   Take 1 tablet (75 mg total) by mouth daily   Refills: 0  10/20 am         famotidine 20 mg tablet   Commonly known as: PEPCID   Take 20 mg by mouth 2 (two) times a day   Refills: 0    Need dose tonight       fluticasone-salmeterol 500-50 mcg/dose inhaler   Commonly known as: ADVAIR, WIXELA   Inhale 1 puff every 12 (twelve) hours   Refills: 0          folic acid 1 mg tablet   Commonly known as: FOLVITE   Take 1 tablet (1 mg total) by mouth daily   Refills: 0  10/20 am         fosinopril 10 mg tablet   Commonly known as: MONOPRIL   Take 10 mg by mouth daily   Refills: 0          magnesium oxide 400 mg   Commonly known as: MAG-OX   Take 1 tablet (400 mg total) by mouth daily   Refills: 0          metFORMIN 1000 MG tablet   Commonly known as: GLUCOPHAGE   Take 1,000 mg by mouth 2 (two) times a day with meals   Refills: 0          metoprolol succinate 100 mg 24 hr tablet   Commonly known as: TOPROL-XL   Start taking on: October 20, 2019   Take 1 tablet (100 mg total) by mouth daily   Refills: 3   What changed:    0 medication strength   0 how much to take  10/20 am         ONE TOUCH ULTRA TEST test strip   Generic drug: glucose blood   3 (three) times a day Test   Refills: 0          ranolazine 500 mg 12 hr tablet   Commonly known as: RANEXA   Take 1 tablet (500 mg total) by mouth 2 (two) times a day   Refills: 0    Need dose tonight       rosuvastatin 20 MG tablet   Commonly known as: CRESTOR   Take 1 tablet (20 mg total) by mouth daily   Refills: 0          sertraline 100 mg tablet   Commonly known as: ZOLOFT   Take 100 mg by mouth daily   Refills: 0  10/20 am         TAMSULOSIN HCL PO   Take 0 4 mg by mouth daily   Refills: 0  10/20 am         tiotropium 18 mcg inhalation capsule   Commonly known as: SPIRIVA   Place 18 mcg into inhaler and inhale daily   Refills: 0  10/20 am              Allergies  No Known Allergies    Diet restrictions: none  Activity restrictions: none  Code Status: Level 1 - Full Code  Advance Directive and Living Will: <no information>  Power of :    POLST:      Discharge Condition: good      Discharge  Statement   I spent 40 minutes discharging the patient  This time was spent on the day of discharge  I had direct contact with the patient on the day of discharge  Additional documentation is required if more than 30 minutes were spent on discharge

## 2019-10-21 ENCOUNTER — TRANSITIONAL CARE MANAGEMENT (OUTPATIENT)
Dept: FAMILY MEDICINE CLINIC | Facility: CLINIC | Age: 59
End: 2019-10-21

## 2019-10-21 LAB
METHYLMALONATE SERPL-SCNC: 163 NMOL/L (ref 0–378)
SL AMB DISCLAIMER: NORMAL

## 2019-10-21 NOTE — PHYSICIAN ADVISOR
Current patient class: Inpatient  The patient is currently on Hospital Day: 3 at [address] [place] [address]      The patient was admitted to the hospital at [phone] on 10/18/19 for the following diagnosis:  Hypomagnesemia [E83 42]  Hyponatremia [E87 1]  Weakness [R53 1]  ENMA (acute kidney injury) ([facility] [address] ) [N17 9]  Left humeral fracture [S42 302A]       There is documentation in the medical record of an expected length of stay of at least 2 midnights  The patient is therefore expected to satisfy the 2 midnight benchmark and given the 2 midnight presumption is appropriate for INPATIENT ADMISSION  Given this expectation of a satisfying stay, CMS instructs us that the patient is most often appropriate for inpatient admission under part A provided medical necessity is documented in the chart  After review of the relevant documentation, labs, vital signs and test results, the patient is appropriate for INPATIENT ADMISSION  Admission to the hospital as an inpatient is a complex decision making process which requires the practitioner to consider the patients presenting complaint, history and physical examination and all relevant testing  With this in mind, in this case, the patient was deemed appropriate for INPATIENT ADMISSION  After review of the documentation and testing available at the time of the admission I concur with this clinical determination of medical necessity  Rationale is as follows: The patient is a 64 yrs old Male who presented to the ED at 10/17/2019  9:45 AM with a chief complaint of Weakness - Generalized (Pt c/o generalized weakness for months- getting worse  Assoc  with feeling lightheaded and dizzy, shaky  Rushie Juventino yesterday- sts just got out of bed and fell  Has been trying to cut back on his drinking because he thought that was causing his problem  Did drink this am   Pain back of neck from fall    Collar applied )     The patient is being admitted with generalized weakness, ENMA on CKD and left humeral fracture  The plan of care includes orthopedics consultation, IVF, repeat labs, nephrology consultation, magnesium repletion  This patient is appropriate for INPATIENT admission  The patients vitals on arrival were ED Triage Vitals   Temperature Pulse Respirations Blood Pressure SpO2   10/17/19 1000 10/17/19 1000 10/17/19 1000 10/17/19 1000 10/17/19 1000   98 °F (36 7 °C) 60 16 97/53 92 %      Temp Source Heart Rate Source Patient Position - Orthostatic VS BP Location FiO2 (%)   10/17/19 1143 10/17/19 1143 10/17/19 1143 10/17/19 1143 --   Tympanic Monitor Lying Right arm       Pain Score       10/17/19 1000       3           Past Medical History:   Diagnosis Date    Cancer [facility])     prostate ca,had radiation    Cardiac disease     stents,then triple bypass    COPD (chronic obstructive pulmonary disease) ([facility] [address] )     Diabetes mellitus ([facility] [address] )     ETOH abuse     Hx of heart artery stent     2014    Hyperlipidemia     Hypertension     Prostate CA ([facility] [address] )     Renal disorder     pyelonephritis    S/P CABG x 1     2004     Past Surgical History:   Procedure Laterality Date    CORONARY ARTERY BYPASS GRAFT  2004    TONSILLECTOMY             Consults have been placed to:   IP CONSULT TO NEPHROLOGY  IP CONSULT TO ORTHOPEDIC SURGERY    Vitals:    10/19/19 0346 10/19/19 0724 10/19/19 0800 10/19/19 1521   BP:   (!) 172/83 (!) 177/86   BP Location:   Left arm Left arm   Pulse:   57 65   Resp:   20 18   Temp:   97 7 °F (36 5 °C) 97 7 °F (36 5 °C)   TempSrc:   Tympanic Tympanic   SpO2: 94% 95% 93% 97%   Weight:       Height:           Most recent labs:    Recent Labs     10/19/19  0453   WBC 5 21   HGB 11 9*   HCT 36 6   *   K 4 1   CALCIUM 8 3   BUN 28*   CREATININE 0 97   AST 42   ALT 44   ALKPHOS 65       Scheduled Meds:  Continuous Infusions:  No current facility-administered medications for this encounter  PRN Meds:      Surgical procedures (if appropriate):

## 2019-10-24 ENCOUNTER — TELEPHONE (OUTPATIENT)
Dept: FAMILY MEDICINE CLINIC | Facility: CLINIC | Age: 59
End: 2019-10-24

## 2019-10-24 NOTE — TELEPHONE ENCOUNTER
S/w Patient, states he received a voice mail regarding lab results, he would like a call back at the number listed below;    [phone]

## 2019-10-25 RX ORDER — LANOLIN ALCOHOL/MO/W.PET/CERES
400 CREAM (GRAM) TOPICAL DAILY
Refills: 0 | COMMUNITY
Start: 2019-10-19 | End: 2019-11-25 | Stop reason: HOSPADM

## 2019-10-26 ENCOUNTER — TELEPHONE (OUTPATIENT)
Dept: FAMILY MEDICINE CLINIC | Facility: CLINIC | Age: 59
End: 2019-10-26

## 2019-10-26 NOTE — TELEPHONE ENCOUNTER
Attempted to call patient multiple times, to discuss positive fecal occult blood test and his need for colonoscopy  He has not yet responded, however noted he does have [facility] appointment 10/28/2019 with Dr Yimi Perez    I have made Dr Yimi Perez aware of these results, and asked the he discuss it with the patient during [facility] visit

## 2019-10-28 ENCOUNTER — OFFICE VISIT (OUTPATIENT)
Dept: FAMILY MEDICINE CLINIC | Facility: CLINIC | Age: 59
End: 2019-10-28
Payer: MEDICARE

## 2019-10-28 VITALS
BODY MASS INDEX: 25.46 KG/M2 | HEIGHT: 74 IN | OXYGEN SATURATION: 95 % | WEIGHT: 198.4 LBS | DIASTOLIC BLOOD PRESSURE: 40 MMHG | RESPIRATION RATE: 18 BRPM | HEART RATE: 92 BPM | SYSTOLIC BLOOD PRESSURE: 80 MMHG

## 2019-10-28 DIAGNOSIS — F10.10 ALCOHOL ABUSE: ICD-10-CM

## 2019-10-28 DIAGNOSIS — R39.11 URINARY HESITANCY: Primary | ICD-10-CM

## 2019-10-28 DIAGNOSIS — Z12.11 ENCOUNTER FOR SCREENING COLONOSCOPY: ICD-10-CM

## 2019-10-28 PROCEDURE — 99495 TRANSJ CARE MGMT MOD F2F 14D: CPT | Performed by: FAMILY MEDICINE

## 2019-10-28 RX ORDER — ESOMEPRAZOLE MAGNESIUM 40 MG/1
CAPSULE, DELAYED RELEASE ORAL
Refills: 0 | Status: ON HOLD | COMMUNITY
Start: 2019-10-20 | End: 2019-12-25 | Stop reason: SDUPTHER

## 2019-10-28 NOTE — PROGRESS NOTES
Assessment/Plan:  Tcm  Patient was recently admitted for acute kidney injury he was provided IV fluids and his creatinine level normalized  He is a chronic alcoholic as well as current every day smoker since age 17  As far as his recent hospital stay the patient reports he is more less feeling the same he said he felt a couple days afterwards of being in the hospital he felt better but has come back down to his baseline  He is hypotensive at this time but denies any dizziness or lightheadedness and is skin is warm and the rest of his vital signs are within normal limits  He can all edges that he continues to drink as well as smoke every day  I spoke at length with the patient regarding his comorbid conditions and assessed his motivation for wanting to get better  The patient seemed receptive to inpatient detox and I consulted complex Care Management to see if we can get him in for alcohol detox  In addition the patient is a current every day smoker but at this time will focus on the alcohol abuse and then move on to smoking cessation if he is able to get sober  Also the patient complains of increased urinary hesitancy and weak stream despite being on Flomax and having prostate surgery secondary to prostate cancer therefore I will write him a referral to Urology  Lastly the patient had fecal occult blood test positive while he was in the hospital   The patient is in need of getting a colonoscopy  Therefore I refer the patient again to GI for screening colonoscopy  Lastly spoke with the patient at length about establishing a therapeutic relationship with the providers here at the practice  Specifically I told the patient that it would likely be in his best interest if when he would come to visit us on monthly basis if he had 1 or to providers that knew him well so that nothing would be lost during his follow-up visits    I told the patient that doctor Hartley and I would happily coordinate this patient's care so that we could begin to address his multiple issues  I strongly advised the patient to take all of his medications as prescribed that he was discharged with from the hospital and as he is experiencing muscle cramps these may be secondary to his hypomagnesemia which he was prescribed magnesium oxide for any is yet to take  The patient acknowledged understanding will return to office in 1 month  Subjective:      Patient ID: Kesha Garber is a 64 y o  male  TCM Call (since 9/27/2019)     Date and time call was made  10/21/2019  2:15 PM    Hospital care reviewed  Records reviewed    Patient was hospitialized at  [facility]    Date of Admission  10/17/19    Date of discharge  10/19/19    Diagnosis  ENMA-FX humerous    Disposition  Home      TCM Call (since 9/27/2019)     Should patient be enrolled in anticoag monitoring? No    I have advised the patient to call PCP with any new or worsening symptoms  SONI Ignacio LPN             HPI     61-year-old male current every day smoker with alcohol abuse with coronary artery disease status post CABG with recent admission for a KI on CKD here for transition of care visit  Patient has low blood pressure currently in office but denies any syncope dizziness lightheadedness  He does not have any chest pain  He acknowledges that he gets muscle cramps which are bothering him however he reports that he has yet to take his magnesium and he has continued to drink alcohol since he was discharged from the hospital with his most recent drink being this morning      The following portions of the patient's history were reviewed and updated as appropriate: allergies, current medications, past family history, past medical history, past social history, past surgical history and problem list     Review of Systems  per HPI    Objective:      BP (!) 80/40 (BP Location: Left arm, Patient Position: Sitting, Cuff Size: Adult)   Pulse 92   Resp 18   Ht 6' 2" (1 88 m) Wt 90 kg (198 lb 6 4 oz)   SpO2 95%   BMI 25 47 kg/m²          Physical Exam   Constitutional: He is oriented to person, place, and time  He appears well-developed  HENT:   Head: Normocephalic and atraumatic  Neck: Neck supple  Cardiovascular: Normal rate, regular rhythm and normal heart sounds  Pulmonary/Chest: Effort normal  He has wheezes  Abdominal: Soft  Bowel sounds are normal    Musculoskeletal: He exhibits no edema or tenderness  Neurological: He is alert and oriented to person, place, and time  Skin: Skin is warm  Capillary refill takes less than 2 seconds  Psychiatric: He has a normal mood and affect

## 2019-11-22 ENCOUNTER — APPOINTMENT (EMERGENCY)
Dept: RADIOLOGY | Facility: HOSPITAL | Age: 59
DRG: 897 | End: 2019-11-22
Payer: MEDICARE

## 2019-11-22 ENCOUNTER — APPOINTMENT (INPATIENT)
Dept: RADIOLOGY | Facility: HOSPITAL | Age: 59
DRG: 897 | End: 2019-11-22
Payer: MEDICARE

## 2019-11-22 ENCOUNTER — HOSPITAL ENCOUNTER (INPATIENT)
Facility: HOSPITAL | Age: 59
LOS: 3 days | Discharge: HOME | DRG: 897 | End: 2019-11-25
Attending: EMERGENCY MEDICINE | Admitting: FAMILY MEDICINE
Payer: MEDICARE

## 2019-11-22 DIAGNOSIS — I25.10 CAD (CORONARY ARTERY DISEASE): ICD-10-CM

## 2019-11-22 DIAGNOSIS — F10.929 ALCOHOL INTOXICATION (HCC): Primary | ICD-10-CM

## 2019-11-22 DIAGNOSIS — F10.10 CHRONIC ALCOHOL ABUSE: ICD-10-CM

## 2019-11-22 DIAGNOSIS — M54.50 LOW BACK PAIN: ICD-10-CM

## 2019-11-22 LAB
ALBUMIN SERPL BCP-MCNC: 3.8 G/DL (ref 3.5–5)
ALP SERPL-CCNC: 90 U/L (ref 46–116)
ALT SERPL W P-5'-P-CCNC: 48 U/L (ref 12–78)
AMPHETAMINES SERPL QL SCN: NEGATIVE
ANION GAP SERPL CALCULATED.3IONS-SCNC: 10 MMOL/L (ref 4–13)
APTT PPP: 26 SECONDS (ref 23–37)
AST SERPL W P-5'-P-CCNC: 45 U/L (ref 5–45)
BACTERIA UR QL AUTO: ABNORMAL /HPF
BARBITURATES UR QL: NEGATIVE
BASOPHILS # BLD AUTO: 0.11 THOUSANDS/ΜL (ref 0–0.1)
BASOPHILS NFR BLD AUTO: 2 % (ref 0–1)
BENZODIAZ UR QL: NEGATIVE
BILIRUB SERPL-MCNC: 0.2 MG/DL (ref 0.2–1)
BILIRUB UR QL STRIP: NEGATIVE
BUN SERPL-MCNC: 18 MG/DL (ref 5–25)
CALCIUM SERPL-MCNC: 8.3 MG/DL (ref 8.3–10.1)
CHLORIDE SERPL-SCNC: 106 MMOL/L (ref 100–108)
CLARITY UR: CLEAR
CO2 SERPL-SCNC: 31 MMOL/L (ref 21–32)
COCAINE UR QL: NEGATIVE
COLOR UR: ABNORMAL
CREAT SERPL-MCNC: 0.91 MG/DL (ref 0.6–1.3)
EOSINOPHIL # BLD AUTO: 0.13 THOUSAND/ΜL (ref 0–0.61)
EOSINOPHIL NFR BLD AUTO: 2 % (ref 0–6)
ERYTHROCYTE [DISTWIDTH] IN BLOOD BY AUTOMATED COUNT: 13.8 % (ref 11.6–15.1)
ETHANOL SERPL-MCNC: 402 MG/DL (ref 0–3)
GFR SERPL CREATININE-BSD FRML MDRD: 93 ML/MIN/1.73SQ M
GLUCOSE SERPL-MCNC: 108 MG/DL (ref 65–140)
GLUCOSE SERPL-MCNC: 169 MG/DL (ref 65–140)
GLUCOSE UR STRIP-MCNC: NEGATIVE MG/DL
HCT VFR BLD AUTO: 45.9 % (ref 36.5–49.3)
HGB BLD-MCNC: 14.9 G/DL (ref 12–17)
HGB UR QL STRIP.AUTO: ABNORMAL
IMM GRANULOCYTES # BLD AUTO: 0.02 THOUSAND/UL (ref 0–0.2)
IMM GRANULOCYTES NFR BLD AUTO: 0 % (ref 0–2)
INR PPP: 0.96 (ref 0.91–1.09)
KETONES UR STRIP-MCNC: NEGATIVE MG/DL
LEUKOCYTE ESTERASE UR QL STRIP: NEGATIVE
LYMPHOCYTES # BLD AUTO: 1.87 THOUSANDS/ΜL (ref 0.6–4.47)
LYMPHOCYTES NFR BLD AUTO: 30 % (ref 14–44)
MAGNESIUM SERPL-MCNC: 1.8 MG/DL (ref 1.6–2.6)
MCH RBC QN AUTO: 32.7 PG (ref 26.8–34.3)
MCHC RBC AUTO-ENTMCNC: 32.5 G/DL (ref 31.4–37.4)
MCV RBC AUTO: 101 FL (ref 82–98)
METHADONE UR QL: NEGATIVE
MONOCYTES # BLD AUTO: 0.8 THOUSAND/ΜL (ref 0.17–1.22)
MONOCYTES NFR BLD AUTO: 13 % (ref 4–12)
NEUTROPHILS # BLD AUTO: 3.37 THOUSANDS/ΜL (ref 1.85–7.62)
NEUTS SEG NFR BLD AUTO: 53 % (ref 43–75)
NITRITE UR QL STRIP: NEGATIVE
NON-SQ EPI CELLS URNS QL MICRO: ABNORMAL /HPF
NRBC BLD AUTO-RTO: 0 /100 WBCS
OPIATES UR QL SCN: NEGATIVE
PCP UR QL: NEGATIVE
PH UR STRIP.AUTO: 6 [PH]
PHOSPHATE SERPL-MCNC: 2.8 MG/DL (ref 2.7–4.5)
PLATELET # BLD AUTO: 168 THOUSANDS/UL (ref 149–390)
PMV BLD AUTO: 10.1 FL (ref 8.9–12.7)
POTASSIUM SERPL-SCNC: 3.7 MMOL/L (ref 3.5–5.3)
PROT SERPL-MCNC: 8 G/DL (ref 6.4–8.2)
PROT UR STRIP-MCNC: NEGATIVE MG/DL
PROTHROMBIN TIME: 10.4 SECONDS (ref 9.8–12)
RBC # BLD AUTO: 4.55 MILLION/UL (ref 3.88–5.62)
RBC #/AREA URNS AUTO: ABNORMAL /HPF
SODIUM SERPL-SCNC: 147 MMOL/L (ref 136–145)
SP GR UR STRIP.AUTO: 1.02 (ref 1–1.03)
THC UR QL: NEGATIVE
TROPONIN I SERPL-MCNC: <0.02 NG/ML
UROBILINOGEN UR QL STRIP.AUTO: 0.2 E.U./DL
WBC # BLD AUTO: 6.3 THOUSAND/UL (ref 4.31–10.16)
WBC #/AREA URNS AUTO: ABNORMAL /HPF

## 2019-11-22 PROCEDURE — 84100 ASSAY OF PHOSPHORUS: CPT | Performed by: EMERGENCY MEDICINE

## 2019-11-22 PROCEDURE — 93005 ELECTROCARDIOGRAM TRACING: CPT

## 2019-11-22 PROCEDURE — 80320 DRUG SCREEN QUANTALCOHOLS: CPT | Performed by: EMERGENCY MEDICINE

## 2019-11-22 PROCEDURE — 96368 THER/DIAG CONCURRENT INF: CPT

## 2019-11-22 PROCEDURE — 96375 TX/PRO/DX INJ NEW DRUG ADDON: CPT

## 2019-11-22 PROCEDURE — 80053 COMPREHEN METABOLIC PANEL: CPT | Performed by: EMERGENCY MEDICINE

## 2019-11-22 PROCEDURE — 96365 THER/PROPH/DIAG IV INF INIT: CPT

## 2019-11-22 PROCEDURE — 99285 EMERGENCY DEPT VISIT HI MDM: CPT

## 2019-11-22 PROCEDURE — 71045 X-RAY EXAM CHEST 1 VIEW: CPT

## 2019-11-22 PROCEDURE — 36415 COLL VENOUS BLD VENIPUNCTURE: CPT | Performed by: EMERGENCY MEDICINE

## 2019-11-22 PROCEDURE — 72100 X-RAY EXAM L-S SPINE 2/3 VWS: CPT

## 2019-11-22 PROCEDURE — 82948 REAGENT STRIP/BLOOD GLUCOSE: CPT

## 2019-11-22 PROCEDURE — 94640 AIRWAY INHALATION TREATMENT: CPT

## 2019-11-22 PROCEDURE — 81001 URINALYSIS AUTO W/SCOPE: CPT | Performed by: EMERGENCY MEDICINE

## 2019-11-22 PROCEDURE — 84484 ASSAY OF TROPONIN QUANT: CPT | Performed by: EMERGENCY MEDICINE

## 2019-11-22 PROCEDURE — 83735 ASSAY OF MAGNESIUM: CPT | Performed by: EMERGENCY MEDICINE

## 2019-11-22 PROCEDURE — 85025 COMPLETE CBC W/AUTO DIFF WBC: CPT | Performed by: EMERGENCY MEDICINE

## 2019-11-22 PROCEDURE — 85610 PROTHROMBIN TIME: CPT | Performed by: EMERGENCY MEDICINE

## 2019-11-22 PROCEDURE — 85730 THROMBOPLASTIN TIME PARTIAL: CPT | Performed by: EMERGENCY MEDICINE

## 2019-11-22 PROCEDURE — 80307 DRUG TEST PRSMV CHEM ANLYZR: CPT | Performed by: EMERGENCY MEDICINE

## 2019-11-22 RX ORDER — FOLIC ACID 1 MG/1
1 TABLET ORAL DAILY
Status: DISCONTINUED | OUTPATIENT
Start: 2019-11-23 | End: 2019-11-25 | Stop reason: HOSPADM

## 2019-11-22 RX ORDER — RANOLAZINE 500 MG/1
500 TABLET, EXTENDED RELEASE ORAL 2 TIMES DAILY
Status: DISCONTINUED | OUTPATIENT
Start: 2019-11-23 | End: 2019-11-25 | Stop reason: HOSPADM

## 2019-11-22 RX ORDER — PRAVASTATIN SODIUM 40 MG
40 TABLET ORAL
Status: DISCONTINUED | OUTPATIENT
Start: 2019-11-23 | End: 2019-11-25 | Stop reason: HOSPADM

## 2019-11-22 RX ORDER — METOPROLOL SUCCINATE 50 MG/1
50 TABLET, EXTENDED RELEASE ORAL DAILY
Status: DISCONTINUED | OUTPATIENT
Start: 2019-11-23 | End: 2019-11-25 | Stop reason: HOSPADM

## 2019-11-22 RX ORDER — FLUTICASONE FUROATE AND VILANTEROL 100; 25 UG/1; UG/1
1 POWDER RESPIRATORY (INHALATION) DAILY
Status: DISCONTINUED | OUTPATIENT
Start: 2019-11-23 | End: 2019-11-25 | Stop reason: HOSPADM

## 2019-11-22 RX ORDER — CHLORDIAZEPOXIDE HYDROCHLORIDE 5 MG/1
10 CAPSULE, GELATIN COATED ORAL 2 TIMES DAILY
Status: DISCONTINUED | OUTPATIENT
Start: 2019-11-23 | End: 2019-11-23

## 2019-11-22 RX ORDER — CLOPIDOGREL BISULFATE 75 MG/1
75 TABLET ORAL DAILY
Status: DISCONTINUED | OUTPATIENT
Start: 2019-11-23 | End: 2019-11-25 | Stop reason: HOSPADM

## 2019-11-22 RX ORDER — LISINOPRIL 10 MG/1
10 TABLET ORAL DAILY
Status: DISCONTINUED | OUTPATIENT
Start: 2019-11-23 | End: 2019-11-25 | Stop reason: HOSPADM

## 2019-11-22 RX ORDER — TAMSULOSIN HYDROCHLORIDE 0.4 MG/1
0.4 CAPSULE ORAL DAILY
Status: DISCONTINUED | OUTPATIENT
Start: 2019-11-23 | End: 2019-11-25 | Stop reason: HOSPADM

## 2019-11-22 RX ORDER — AMLODIPINE BESYLATE 5 MG/1
5 TABLET ORAL DAILY
Status: DISCONTINUED | OUTPATIENT
Start: 2019-11-23 | End: 2019-11-25 | Stop reason: HOSPADM

## 2019-11-22 RX ORDER — CHLORDIAZEPOXIDE HYDROCHLORIDE 10 MG/1
10 CAPSULE, GELATIN COATED ORAL 2 TIMES DAILY
COMMUNITY
End: 2019-11-25 | Stop reason: HOSPADM

## 2019-11-22 RX ORDER — METHYLPREDNISOLONE SODIUM SUCCINATE 125 MG/2ML
60 INJECTION, POWDER, LYOPHILIZED, FOR SOLUTION INTRAMUSCULAR; INTRAVENOUS ONCE
Status: COMPLETED | OUTPATIENT
Start: 2019-11-22 | End: 2019-11-22

## 2019-11-22 RX ORDER — PANTOPRAZOLE SODIUM 20 MG/1
20 TABLET, DELAYED RELEASE ORAL
Status: DISCONTINUED | OUTPATIENT
Start: 2019-11-23 | End: 2019-11-23

## 2019-11-22 RX ORDER — SERTRALINE HYDROCHLORIDE 100 MG/1
100 TABLET, FILM COATED ORAL DAILY
Status: DISCONTINUED | OUTPATIENT
Start: 2019-11-23 | End: 2019-11-25 | Stop reason: HOSPADM

## 2019-11-22 RX ORDER — ALBUTEROL SULFATE 90 UG/1
2 AEROSOL, METERED RESPIRATORY (INHALATION) EVERY 4 HOURS PRN
Status: DISCONTINUED | OUTPATIENT
Start: 2019-11-22 | End: 2019-11-25

## 2019-11-22 RX ORDER — FAMOTIDINE 20 MG/1
20 TABLET, FILM COATED ORAL 2 TIMES DAILY
Status: DISCONTINUED | OUTPATIENT
Start: 2019-11-23 | End: 2019-11-25 | Stop reason: HOSPADM

## 2019-11-22 RX ADMIN — IPRATROPIUM BROMIDE 0.5 MG: 0.5 SOLUTION RESPIRATORY (INHALATION) at 20:41

## 2019-11-22 RX ADMIN — FOLIC ACID 1 MG: 5 INJECTION, SOLUTION INTRAMUSCULAR; INTRAVENOUS; SUBCUTANEOUS at 20:26

## 2019-11-22 RX ADMIN — SODIUM CHLORIDE 1000 ML: 0.9 INJECTION, SOLUTION INTRAVENOUS at 20:25

## 2019-11-22 RX ADMIN — THIAMINE HYDROCHLORIDE 100 MG: 100 INJECTION, SOLUTION INTRAMUSCULAR; INTRAVENOUS at 20:25

## 2019-11-22 RX ADMIN — ALBUTEROL SULFATE 5 MG: 2.5 SOLUTION RESPIRATORY (INHALATION) at 20:41

## 2019-11-22 RX ADMIN — METHYLPREDNISOLONE SODIUM SUCCINATE 60 MG: 125 INJECTION, POWDER, FOR SOLUTION INTRAMUSCULAR; INTRAVENOUS at 20:40

## 2019-11-23 PROBLEM — M54.9 BACK PAIN: Status: ACTIVE | Noted: 2019-11-23

## 2019-11-23 PROBLEM — K21.9 GERD (GASTROESOPHAGEAL REFLUX DISEASE): Status: ACTIVE | Noted: 2019-11-23

## 2019-11-23 PROBLEM — E87.0 HYPERNATREMIA: Status: ACTIVE | Noted: 2019-11-23

## 2019-11-23 PROBLEM — R65.10 SIRS (SYSTEMIC INFLAMMATORY RESPONSE SYNDROME) (HCC): Status: ACTIVE | Noted: 2019-11-23

## 2019-11-23 PROBLEM — F10.920 ACUTE ALCOHOLIC INTOXICATION WITHOUT COMPLICATION (HCC): Status: ACTIVE | Noted: 2019-11-23

## 2019-11-23 LAB
ALBUMIN SERPL BCP-MCNC: 3.5 G/DL (ref 3.5–5)
ALP SERPL-CCNC: 85 U/L (ref 46–116)
ALT SERPL W P-5'-P-CCNC: 46 U/L (ref 12–78)
ANION GAP SERPL CALCULATED.3IONS-SCNC: 8 MMOL/L (ref 4–13)
AST SERPL W P-5'-P-CCNC: 40 U/L (ref 5–45)
BILIRUB SERPL-MCNC: 0.2 MG/DL (ref 0.2–1)
BUN SERPL-MCNC: 18 MG/DL (ref 5–25)
CALCIUM SERPL-MCNC: 8.1 MG/DL (ref 8.3–10.1)
CHLORIDE SERPL-SCNC: 104 MMOL/L (ref 100–108)
CO2 SERPL-SCNC: 30 MMOL/L (ref 21–32)
CREAT SERPL-MCNC: 1.06 MG/DL (ref 0.6–1.3)
ERYTHROCYTE [DISTWIDTH] IN BLOOD BY AUTOMATED COUNT: 13.8 % (ref 11.6–15.1)
EST. AVERAGE GLUCOSE BLD GHB EST-MCNC: 108 MG/DL
GFR SERPL CREATININE-BSD FRML MDRD: 78 ML/MIN/1.73SQ M
GLUCOSE SERPL-MCNC: 265 MG/DL (ref 65–140)
HBA1C MFR BLD: 5.4 % (ref 4.2–6.3)
HCT VFR BLD AUTO: 42.6 % (ref 36.5–49.3)
HGB BLD-MCNC: 14 G/DL (ref 12–17)
MAGNESIUM SERPL-MCNC: 1.4 MG/DL (ref 1.6–2.6)
MCH RBC QN AUTO: 33.4 PG (ref 26.8–34.3)
MCHC RBC AUTO-ENTMCNC: 32.9 G/DL (ref 31.4–37.4)
MCV RBC AUTO: 102 FL (ref 82–98)
PHOSPHATE SERPL-MCNC: 2.1 MG/DL (ref 2.7–4.5)
PLATELET # BLD AUTO: 149 THOUSANDS/UL (ref 149–390)
PMV BLD AUTO: 10.6 FL (ref 8.9–12.7)
POTASSIUM SERPL-SCNC: 4.7 MMOL/L (ref 3.5–5.3)
PROT SERPL-MCNC: 7.8 G/DL (ref 6.4–8.2)
RBC # BLD AUTO: 4.19 MILLION/UL (ref 3.88–5.62)
SODIUM SERPL-SCNC: 142 MMOL/L (ref 136–145)
WBC # BLD AUTO: 2.56 THOUSAND/UL (ref 4.31–10.16)

## 2019-11-23 PROCEDURE — 83735 ASSAY OF MAGNESIUM: CPT | Performed by: STUDENT IN AN ORGANIZED HEALTH CARE EDUCATION/TRAINING PROGRAM

## 2019-11-23 PROCEDURE — 80053 COMPREHEN METABOLIC PANEL: CPT | Performed by: STUDENT IN AN ORGANIZED HEALTH CARE EDUCATION/TRAINING PROGRAM

## 2019-11-23 PROCEDURE — 85027 COMPLETE CBC AUTOMATED: CPT | Performed by: STUDENT IN AN ORGANIZED HEALTH CARE EDUCATION/TRAINING PROGRAM

## 2019-11-23 PROCEDURE — 84100 ASSAY OF PHOSPHORUS: CPT | Performed by: STUDENT IN AN ORGANIZED HEALTH CARE EDUCATION/TRAINING PROGRAM

## 2019-11-23 PROCEDURE — 83036 HEMOGLOBIN GLYCOSYLATED A1C: CPT | Performed by: STUDENT IN AN ORGANIZED HEALTH CARE EDUCATION/TRAINING PROGRAM

## 2019-11-23 PROCEDURE — ND001 PR NO DOCUMENTATION: Performed by: FAMILY MEDICINE

## 2019-11-23 PROCEDURE — 94760 N-INVAS EAR/PLS OXIMETRY 1: CPT

## 2019-11-23 PROCEDURE — 94762 N-INVAS EAR/PLS OXIMTRY CONT: CPT

## 2019-11-23 RX ORDER — NICOTINE 21 MG/24HR
21 PATCH, TRANSDERMAL 24 HOURS TRANSDERMAL DAILY
Status: DISCONTINUED | OUTPATIENT
Start: 2019-11-23 | End: 2019-11-25 | Stop reason: HOSPADM

## 2019-11-23 RX ORDER — LORAZEPAM 1 MG/1
2 TABLET ORAL ONCE
Status: COMPLETED | OUTPATIENT
Start: 2019-11-23 | End: 2019-11-23

## 2019-11-23 RX ORDER — THIAMINE MONONITRATE (VIT B1) 100 MG
100 TABLET ORAL DAILY
Status: DISCONTINUED | OUTPATIENT
Start: 2019-11-23 | End: 2019-11-25 | Stop reason: HOSPADM

## 2019-11-23 RX ORDER — CHLORDIAZEPOXIDE HYDROCHLORIDE 25 MG/1
25 CAPSULE, GELATIN COATED ORAL EVERY 6 HOURS SCHEDULED
Status: DISCONTINUED | OUTPATIENT
Start: 2019-11-23 | End: 2019-11-23

## 2019-11-23 RX ORDER — ONDANSETRON 2 MG/ML
4 INJECTION INTRAMUSCULAR; INTRAVENOUS EVERY 6 HOURS PRN
Status: DISCONTINUED | OUTPATIENT
Start: 2019-11-23 | End: 2019-11-25 | Stop reason: HOSPADM

## 2019-11-23 RX ORDER — LORAZEPAM 2 MG/ML
2 INJECTION INTRAMUSCULAR EVERY 6 HOURS PRN
Status: DISCONTINUED | OUTPATIENT
Start: 2019-11-23 | End: 2019-11-24

## 2019-11-23 RX ORDER — CHLORDIAZEPOXIDE HYDROCHLORIDE 25 MG/1
50 CAPSULE, GELATIN COATED ORAL EVERY 6 HOURS SCHEDULED
Status: DISCONTINUED | OUTPATIENT
Start: 2019-11-23 | End: 2019-11-25 | Stop reason: HOSPADM

## 2019-11-23 RX ORDER — ONDANSETRON 4 MG/1
4 TABLET, ORALLY DISINTEGRATING ORAL EVERY 6 HOURS PRN
Status: DISCONTINUED | OUTPATIENT
Start: 2019-11-23 | End: 2019-11-23

## 2019-11-23 RX ORDER — ACETAMINOPHEN 325 MG/1
650 TABLET ORAL EVERY 6 HOURS PRN
Status: DISCONTINUED | OUTPATIENT
Start: 2019-11-23 | End: 2019-11-25 | Stop reason: HOSPADM

## 2019-11-23 RX ORDER — MAGNESIUM SULFATE HEPTAHYDRATE 40 MG/ML
2 INJECTION, SOLUTION INTRAVENOUS ONCE
Status: COMPLETED | OUTPATIENT
Start: 2019-11-23 | End: 2019-11-23

## 2019-11-23 RX ORDER — IPRATROPIUM BROMIDE AND ALBUTEROL SULFATE 2.5; .5 MG/3ML; MG/3ML
3 SOLUTION RESPIRATORY (INHALATION) EVERY 6 HOURS PRN
Status: DISCONTINUED | OUTPATIENT
Start: 2019-11-23 | End: 2019-11-25

## 2019-11-23 RX ORDER — ACETAMINOPHEN 325 MG/1
650 TABLET ORAL EVERY 6 HOURS PRN
Status: DISCONTINUED | OUTPATIENT
Start: 2019-11-23 | End: 2019-11-23

## 2019-11-23 RX ORDER — LORAZEPAM 2 MG/ML
1 INJECTION INTRAMUSCULAR ONCE
Status: COMPLETED | OUTPATIENT
Start: 2019-11-23 | End: 2019-11-23

## 2019-11-23 RX ADMIN — FOLIC ACID 1 MG: 1 TABLET ORAL at 09:35

## 2019-11-23 RX ADMIN — LORAZEPAM 2 MG: 1 TABLET ORAL at 00:52

## 2019-11-23 RX ADMIN — ENOXAPARIN SODIUM 40 MG: 40 INJECTION SUBCUTANEOUS at 09:38

## 2019-11-23 RX ADMIN — RANOLAZINE 500 MG: 500 TABLET, FILM COATED, EXTENDED RELEASE ORAL at 17:20

## 2019-11-23 RX ADMIN — LORAZEPAM 2 MG: 1 TABLET ORAL at 18:58

## 2019-11-23 RX ADMIN — METOPROLOL SUCCINATE 50 MG: 50 TABLET, EXTENDED RELEASE ORAL at 09:23

## 2019-11-23 RX ADMIN — NICOTINE 21 MG: 21 PATCH, EXTENDED RELEASE TRANSDERMAL at 10:39

## 2019-11-23 RX ADMIN — CLOPIDOGREL BISULFATE 75 MG: 75 TABLET ORAL at 09:35

## 2019-11-23 RX ADMIN — LORAZEPAM 1 MG: 2 INJECTION INTRAMUSCULAR; INTRAVENOUS at 15:17

## 2019-11-23 RX ADMIN — CHLORDIAZEPOXIDE HYDROCHLORIDE 50 MG: 25 CAPSULE ORAL at 11:35

## 2019-11-23 RX ADMIN — SERTRALINE HYDROCHLORIDE 100 MG: 100 TABLET ORAL at 09:28

## 2019-11-23 RX ADMIN — FLUTICASONE FUROATE AND VILANTEROL TRIFENATATE 1 PUFF: 100; 25 POWDER RESPIRATORY (INHALATION) at 09:38

## 2019-11-23 RX ADMIN — LISINOPRIL 10 MG: 10 TABLET ORAL at 09:35

## 2019-11-23 RX ADMIN — ALBUTEROL SULFATE 2 PUFF: 90 AEROSOL, METERED RESPIRATORY (INHALATION) at 09:32

## 2019-11-23 RX ADMIN — CHLORDIAZEPOXIDE HYDROCHLORIDE 50 MG: 25 CAPSULE ORAL at 17:21

## 2019-11-23 RX ADMIN — TIOTROPIUM BROMIDE 18 MCG: 18 CAPSULE ORAL; RESPIRATORY (INHALATION) at 09:31

## 2019-11-23 RX ADMIN — RANOLAZINE 500 MG: 500 TABLET, FILM COATED, EXTENDED RELEASE ORAL at 09:23

## 2019-11-23 RX ADMIN — METFORMIN HYDROCHLORIDE 1000 MG: 500 TABLET ORAL at 09:23

## 2019-11-23 RX ADMIN — FAMOTIDINE 20 MG: 20 TABLET ORAL at 17:20

## 2019-11-23 RX ADMIN — TAMSULOSIN HYDROCHLORIDE 0.4 MG: 0.4 CAPSULE ORAL at 09:28

## 2019-11-23 RX ADMIN — FAMOTIDINE 20 MG: 20 TABLET ORAL at 09:35

## 2019-11-23 RX ADMIN — PRAVASTATIN SODIUM 40 MG: 40 TABLET ORAL at 17:20

## 2019-11-23 RX ADMIN — Medication 100 MG: at 09:28

## 2019-11-23 RX ADMIN — POTASSIUM PHOSPHATE, MONOBASIC AND POTASSIUM PHOSPHATE, DIBASIC 15 MMOL: 224; 236 INJECTION, SOLUTION, CONCENTRATE INTRAVENOUS at 09:40

## 2019-11-23 RX ADMIN — LORAZEPAM 2 MG: 1 TABLET ORAL at 12:59

## 2019-11-23 RX ADMIN — LORAZEPAM 2 MG: 1 TABLET ORAL at 09:22

## 2019-11-23 RX ADMIN — MAGNESIUM SULFATE HEPTAHYDRATE 2 G: 40 INJECTION, SOLUTION INTRAVENOUS at 06:07

## 2019-11-23 RX ADMIN — AMLODIPINE BESYLATE 5 MG: 5 TABLET ORAL at 09:35

## 2019-11-23 RX ADMIN — METFORMIN HYDROCHLORIDE 1000 MG: 500 TABLET ORAL at 17:20

## 2019-11-23 NOTE — ED NOTES
Pt ambulated through dawson without difficulty  Pt replaced into room,plan of care explained,pt loud demanding food and to call his son  After several minutes of redirecting pt ,pt calmed down       Cleopatra Farnsworth RN  11/22/19 5629

## 2019-11-23 NOTE — ED NOTES
Patient is resting comfortably  Regular,unlabored respirations noted       Guillermo Otto RN  11/22/19 8205

## 2019-11-23 NOTE — PLAN OF CARE
Problem: Potential for Falls  Goal: Patient will remain free of falls  Description  INTERVENTIONS:  - Assess patient frequently for physical needs  -  Identify cognitive and physical deficits and behaviors that affect risk of falls  -  [place] fall precautions as indicated by assessment   - Educate patient/family on patient safety including physical limitations  - Instruct patient to call for assistance with activity based on assessment  - Modify environment to reduce risk of injury  - Consider OT/PT consult to assist with strengthening/mobility  Outcome: Progressing     Problem: Prexisting or High Potential for Compromised Skin Integrity  Goal: Skin integrity is maintained or improved  Description  INTERVENTIONS:  - Identify patients at risk for skin breakdown  - Assess and monitor skin integrity  - Assess and monitor nutrition and hydration status  - Monitor labs   - Assess for incontinence   - Turn and reposition patient  - Assist with mobility/ambulation  - Relieve pressure over bony prominences  - Avoid friction and shearing  - Provide appropriate hygiene as needed including keeping skin clean and dry  - Evaluate need for skin moisturizer/barrier cream  - Collaborate with interdisciplinary team   - Patient/family teaching  - Consider wound care consult   Outcome: Progressing     Problem: Nutrition/Hydration-ADULT  Goal: Nutrient/Hydration intake appropriate for improving, restoring or maintaining nutritional needs  Description  Monitor and assess patient's nutrition/hydration status for malnutrition  Collaborate with interdisciplinary team and initiate plan and interventions as ordered  Monitor patient's weight and dietary intake as ordered or per policy  Utilize nutrition screening tool and intervene as necessary  Determine patient's food preferences and provide high-protein, high-caloric foods as appropriate       INTERVENTIONS:  - Monitor oral intake, urinary output, labs, and treatment plans  - Assess nutrition and hydration status and recommend course of action  - Evaluate amount of meals eaten  - Assist patient with eating if necessary   - Allow adequate time for meals  - Recommend/ encourage appropriate diets, oral nutritional supplements, and vitamin/mineral supplements  - Order, calculate, and assess calorie counts as needed  - Recommend, monitor, and adjust tube feedings and TPN/PPN based on assessed needs  - Assess need for intravenous fluids  - Provide specific nutrition/hydration education as appropriate  - Include patient/family/caregiver in decisions related to nutrition  Outcome: Progressing     Problem: PAIN - ADULT  Goal: Verbalizes/displays adequate comfort level or baseline comfort level  Description  Interventions:  - Encourage patient to monitor pain and request assistance  - Assess pain using appropriate pain scale  - Administer analgesics based on type and severity of pain and evaluate response  - Implement non-pharmacological measures as appropriate and evaluate response  - Consider cultural and social influences on pain and pain management  - Notify physician/advanced practitioner if interventions unsuccessful or patient reports new pain  Outcome: Progressing     Problem: INFECTION - ADULT  Goal: Absence or prevention of progression during hospitalization  Description  INTERVENTIONS:  - Assess and monitor for signs and symptoms of infection  - Monitor lab/diagnostic results  - Monitor all insertion sites, i e  indwelling lines, tubes, and drains  - [place] appropriate cooling/warming therapies per order  - Administer medications as ordered  - Instruct and encourage patient and family to use good hand hygiene technique  - Identify and instruct in appropriate isolation precautions for identified infection/condition   Outcome: Progressing     Problem: SAFETY ADULT  Goal: Maintain or return to baseline ADL function  Description  INTERVENTIONS:  -  Assess patient's ability to carry out ADLs; assess patient's baseline for ADL function and identify physical deficits which impact ability to perform ADLs (bathing, care of mouth/teeth, toileting, grooming, dressing, etc )  - Assess/evaluate cause of self-care deficits   - Assess range of motion  - Assess patient's mobility; develop plan if impaired  - Assess patient's need for assistive devices and provide as appropriate  - Encourage maximum independence but intervene and supervise when necessary  - Involve family in performance of ADLs  - Assess for home care needs following discharge   - Consider OT consult to assist with ADL evaluation and planning for discharge  - Provide patient education as appropriate  Outcome: Progressing  Goal: Maintain or return mobility status to optimal level  Description  INTERVENTIONS:  - Assess patient's baseline mobility status (ambulation, transfers, stairs, etc )    - Identify cognitive and physical deficits and behaviors that affect mobility  - Identify mobility aids required to assist with transfers and/or ambulation (gait belt, sit-to-stand, lift, walker, cane, etc )  - [place] fall precautions as indicated by assessment  - Record patient progress and toleration of activity level on Mobility SBAR; progress patient to next Phase/Stage  - Instruct patient to call for assistance with activity based on assessment  - Consider rehabilitation consult to assist with strengthening/weightbearing, etc   Outcome: Progressing     Problem: DISCHARGE PLANNING  Goal: Discharge to home or other facility with appropriate resources  Description  INTERVENTIONS:  - Identify barriers to discharge w/patient and caregiver  - Arrange for needed discharge resources and transportation as appropriate  - Identify discharge learning needs (meds, wound care, etc )  - Arrange for interpretive services to assist at discharge as needed  - Refer to Case Management Department for coordinating discharge planning if the patient needs post-hospital services based on physician/advanced practitioner order or complex needs related to functional status, cognitive ability, or social support system  Outcome: Progressing     Problem: Knowledge Deficit  Goal: Patient/family/caregiver demonstrates understanding of disease process, treatment plan, medications, and discharge instructions  Description  Complete learning assessment and assess knowledge base    Interventions:  - Provide teaching at level of understanding  - Provide teaching via preferred learning methods  Outcome: Progressing     Problem: METABOLIC, FLUID AND ELECTROLYTES - ADULT  Goal: Electrolytes maintained within normal limits  Description  INTERVENTIONS:  - Monitor labs and assess patient for signs and symptoms of electrolyte imbalances  - Administer electrolyte replacement as ordered  - Monitor response to electrolyte replacements, including repeat lab results as appropriate  - Instruct patient on fluid and nutrition as appropriate  Outcome: Progressing  Goal: Fluid balance maintained  Description  INTERVENTIONS:  - Monitor labs   - Monitor I/O and WT  - Instruct patient on fluid and nutrition as appropriate  - Assess for signs & symptoms of volume excess or deficit  Outcome: Progressing  Goal: Glucose maintained within target range  Description  INTERVENTIONS:  - Monitor Blood Glucose as ordered  - Assess for signs and symptoms of hyperglycemia and hypoglycemia  - Administer ordered medications to maintain glucose within target range  - Assess nutritional intake and initiate nutrition service referral as needed  Outcome: Progressing

## 2019-11-23 NOTE — QUICK NOTE
S:  Was paged by nurse regarding patient critical lab value of leukopenia with white count dropping to 2 56 from 6 30 the day before  In addition the patient was found to be tachycardic with a heart rate of 109  I went ahead and examined the patient he was alert and oriented to person place and time he was breathing well on 5 L nasal cannula denied any nausea vomiting fever chills and just remark that he was tired     O:  Temp a 100 2° heart rate 109 respiratory rate 18 blood pressure 130/77 saturating at 92% on 5 L nasal cannula  Patient alert oriented to person place and time  Heart rate tachycardic but regular rhythm lungs clear to auscultation bilaterally belly soft nondistended patient is not diaphoretic not agitated  A/P:  Sirs currently no identifying source of infection his tachycardia may likely be secondary to acute alcohol withdrawal however I am unsure of what the leukopenia is from  Will continue to trend his CBC and look for any source of infection 3 physical examination laboratory value  Continue [facility] protocol for alcohol withdrawal  Also may be possible that tachycardia may be secondary to lack of compliance with metoprolol medication and antihypertensive medication for the last few days while he has been binge drinking so we will also see what happens when his home medications are restarted this morning

## 2019-11-23 NOTE — ED NOTES
ER Attending notified PES that this patient is interested in "detox" - reportedly, the patient drinks about a 1/5th of Vodka daily  Helper [place] stated they would look at the chart  BAL was 402 @ 20:24 - patient had previously been admitted for detox here 6/19 - PES made Attending aware of these facts

## 2019-11-23 NOTE — H&P
H&P Exam - Margie Johnston 64 y o  male MRN: [id number]    Unit/Bed#: ED 12 Encounter: [id number]    [id number] male with past medical history of heavy alcohol abuse, diabetes, hypertension, hyperlipidemia, COPD, prostate cancer status post resection and , CAD status post cabg as well as stent placement in 2014 will be admitted to the general medical floor for acute alcohol intoxication under the services of Dr Ja Nicolas for an anticipated stay of no more than two midnights  Assessment/Plan:  Acute Alcohol Intoxication  Assessment & Plan  ETOH level 402 on admission  Pt has prescription for librium, understands what it is for, but has not been using it  B12, folate, 1 L NS given in ED  -initiate CIWA protocol  -follow-up CMP  -fall precaution  -consult to case management for possible inpatient rehab   -Although electrolytes ok will place on Tele given chance of imbalance 2/2 alcohol abuse  -Can do oral mag/folate/thiamine tomorrow  Replete electrolytes prn     Low back pain  Likely musculoskeletal in nature  Apply heat to affected area  Fu Lumbar x ray & CXR    Nicotine abuse  Assessment & Plan  Nicotine patch prn    GERD  Stable  Can do pepcid for now  Holding nexium may not need PPI and would not be good for him given macrocytosis as PPI's can lead to b12 deficiency     CAD (coronary artery disease)  Status post stent placement as well as cabg in 2014  Unsure of compliance with meds as patient reports he has not taken them since Wednesday the 20th of nov  - Continue home medication , Plavix 75 mg p o  Q d  -Pt reports his heart Doctor (Vivek) told him to take two metoprolol succinate 50 mg p o  Q d but on prescription it only says 50 mg PO qd so will order as prescribed at this time  -ranolazine 500 mg p o  B i d  History of prostate cancer  Assessment & Plan  Status post resection and radiation on remission   Continue flomax     HLD (hyperlipidemia)  Stable-formulary equivalent of pt home statin     Hx of CABG  Assessment & Plan  History of CABG in 2004  Stent in 2014  Patient's cardiologist from [place], [place]  Depression  Assessment & Plan  Continue with Zoloft 100 mg tablet p o  Q d  Hypomagnesemia  Currently low normal, likely secondary to alcohol abuse  Continue home mag ox 400 mg PO qd    Hypernatremia  Possibly due to dehydration  Pt already received 1 L NS  Repeat CMP in am  Oral hydration prn     Essential (primary) hypertension  Stable  Continue with home medication Norvasc 5 mg tablet p o  Q d  Formulary equivalent of Monopril 10 mg p o  Q d  Pt reports his cards Dr Alexei Langford to take Metoprolol succinate 50 mg two times but prescription only says 50 mg PO qd so will order as prescribed at this time  Monitor BP     Type 2 diabetes mellitus ([facility] )  Assessment & Plan        Lab Results   Component Value Date     HGBA1C 5 4 06/07/2019       Continue metformin     COPD (chronic obstructive pulmonary disease) ([facility])  Assessment & Plan  -DuoNeb p r n  Q 6h prn  -Continue with Cristy Oswald in place of home Spiriva  -O2 p r n  Global: SQL, SCD, Card Diet    History of Present Illness   70-year-old male with past medical history of heavy alcohol abuse, diabetes, hypertension, hyperlipidemia, COPD, prostate cancer status post resection, CABG, CAD status post stent placement complains of bilateral 5/10 non radiating paraspinal back pain in the lumbar area x 2 days following lifting of heavy object  Pt denies any trauma or fall  No spinal tenderness, fever, chills  No loss of bowel/bladder function or saddle anesthesia  No dysuria, hematuria, but endorses increased urinary frequency  During interview patient appeared to be shaking endorsed recent 3 day binge drinking episode, with last drinking occurring in the morning of hospital presentation  Per chart review patient had bottle of vodka with him in triage   Pt reports he came to the ED for further back pain evaluation and requested alcohol rehab placement  Review of Systems   Constitutional: Negative for chills and fever  HENT: Positive for dental problem  Eyes: Negative for discharge  Respiratory: Negative for chest tightness, shortness of breath and wheezing  Cardiovascular: Negative for chest pain  Gastrointestinal: Positive for abdominal pain  Negative for abdominal distention, diarrhea, nausea and vomiting  Genitourinary: Positive for frequency  Negative for difficulty urinating and dysuria  Musculoskeletal: Positive for back pain  Negative for myalgias and neck pain  Skin: Negative for color change and pallor  Neurological: Negative for syncope and headaches  Hematological: Does not bruise/bleed easily  Psychiatric/Behavioral: Negative for agitation  All other systems reviewed and are negative        Historical Information   Past Medical History:   Diagnosis Date    Cancer [facility])     prostate ca,had radiation    Cardiac disease     stents,then triple bypass    [facility]ch[facility] disease[facility] )     Diabetes mellitus ([facility] )     ETOH abuse     Hx of heart artery stent     2014    Hyperlipidemia     Hypertension     Prostate CA ([facility] )     Renal disorder     pyelonephritis    S/P CABG x 1     2004     Past Surgical History:   Procedure Laterality Date    CORONARY ARTERY BYPASS GRAFT  2004    TONSILLECTOMY       Social History   Social History     Substance and Sexual Activity   Alcohol Use Yes    Frequency: 4 or more times a week    Drinks per session: 10 or more    Comment: 1/5 of vodka per day     Social History     Substance and Sexual Activity   Drug Use No     Social History     Tobacco Use   Smoking Status Current Every Day Smoker    Packs/day: 1 50    Years: 40 00    Pack years: 60 00   Smokeless Tobacco Never Used     Family History:   Family History   Problem Relation Age of Onset    Diabetes Mother     Uterine cancer Mother     COPD Father     Hypertension Father Meds/Allergies   PTA meds:   Prior to Admission Medications   Prescriptions Last Dose Informant Patient Reported? Taking?    Blood Glucose Monitoring Suppl (ONE TOUCH ULTRA MINI) w/Device KIT Unknown at Unknown time  Yes No   Sig: Use as directed   ONE TOUCH ULTRA TEST test strip Unknown at Unknown time  Yes No   Sig: 3 (three) times a day Test   [facility] F[facility] MISC Unknown at Unknown time  Yes No   Sig: 3 (three) times a day Test   TAMSULOSIN HCL PO Unknown at Unknown time Self Yes No   Sig: Take 0 4 mg by mouth daily   albuterol (PROVENTIL HFA,VENTOLIN HFA) 90 mcg/act inhaler More than a month at Unknown time  No No   Sig: Inhale 2 puffs every 4 (four) hours as needed for wheezing   amLODIPine (NORVASC) 5 mg tablet Unknown at Unknown time  Yes No   Sig: Take 5 mg by mouth daily   chlordiazePOXIDE (LIBRIUM) 10 mg capsule Unknown at Unknown time  Yes No   Sig: Take 10 mg by mouth 2 (two) times a day   clopidogrel (PLAVIX) 75 mg tablet Unknown at Unknown time  No No   Sig: Take 1 tablet (75 mg total) by mouth daily   esomeprazole (NexIUM) 40 MG capsule Unknown at Unknown time  Yes No   famotidine (PEPCID) 20 mg tablet Not Taking at Unknown time  Yes No   Sig: Take 20 mg by mouth 2 (two) times a day   fluticasone-salmeterol (ADVAIR) 500-50 mcg/dose Unknown at Unknown time  Yes No   Sig: Inhale 1 puff every 12 (twelve) hours   folic acid (FOLVITE) 1 mg tablet Not Taking at Unknown time  No No   Sig: Take 1 tablet (1 mg total) by mouth daily   Patient not taking: Reported on 11/22/2019   fosinopril (MONOPRIL) 10 mg tablet Unknown at Unknown time  Yes No   Sig: Take 10 mg by mouth daily    magnesium Oxide (MAG-OX) 400 mg TABS Unknown at Unknown time  Yes No   Sig: Take 400 mg by mouth daily   magnesium oxide (MAG-OX) 400 mg Not Taking at Unknown time  No No   Sig: Take 1 tablet (400 mg total) by mouth daily   Patient not taking: Reported on 11/22/2019   metFORMIN (GLUCOPHAGE) 1000 MG tablet Unknown at Unknown time  Yes No   Sig: Take 1,000 mg by mouth 2 (two) times a day with meals   metoprolol succinate (TOPROL-XL) 100 mg 24 hr tablet Unknown at Unknown time  No No   Sig: Take 1 tablet (100 mg total) by mouth daily   Patient taking differently: Take 50 mg by mouth daily    ranolazine (RANEXA) 500 mg 12 hr tablet Unknown at Unknown time  No No   Sig: Take 1 tablet (500 mg total) by mouth 2 (two) times a day   rosuvastatin (CRESTOR) 20 MG tablet Unknown at Unknown time  No No   Sig: Take 1 tablet (20 mg total) by mouth daily   sertraline (ZOLOFT) 100 mg tablet Unknown at Unknown time  Yes No   Sig: Take 100 mg by mouth daily    tiotropium (SPIRIVA) 18 mcg inhalation capsule Unknown at Unknown time Self Yes No   Sig: Place 18 mcg into inhaler and inhale daily      Facility-Administered Medications: None     No Known Allergies    Objective   First Vitals:   Blood Pressure: 139/82 (11/22/19 1918)  Pulse: 98 (11/22/19 1918)  Temperature: 98 4 °F (36 9 °C) (11/22/19 1918)  Temp Source: Tympanic (11/22/19 1918)  Respirations: (!) 24 (11/22/19 1918)  SpO2: 95 % (11/22/19 1918)    Current Vitals:   Blood Pressure: 139/82 (11/22/19 1918)  Pulse: 98 (11/22/19 1918)  Temperature: 98 4 °F (36 9 °C) (11/22/19 1918)  Temp Source: Tympanic (11/22/19 1918)  Respirations: (!) 24 (11/22/19 1918)  SpO2: 95 % (11/22/19 1918)      Intake/Output Summary (Last 24 hours) at 11/22/2019 2256  Last data filed at 11/22/2019 2136  Gross per 24 hour   Intake 1000 ml   Output    Net 1000 ml       Invasive Devices     Peripheral Intravenous Line            Peripheral IV 10/17/19 Right Arm 36 days    Peripheral IV 11/22/19 Left Antecubital less than 1 day                Physical Exam   Constitutional: He is oriented to person, place, and time  HENT:   Head: Normocephalic and atraumatic     Right Ear: External ear normal    Left Ear: External ear normal    Nose: Nose normal    Mouth/Throat: Oropharynx is clear and moist    Eyes: Pupils are equal, round, and reactive to light  Conjunctivae and EOM are normal  Right eye exhibits no discharge  Left eye exhibits no discharge  No scleral icterus  Neck: Neck supple  Cardiovascular: Normal rate, regular rhythm and normal heart sounds  Pulmonary/Chest: Effort normal and breath sounds normal    Abdominal: Soft  Bowel sounds are normal  He exhibits no distension and no mass  There is tenderness (Mild tenderness in periumbilical region)  There is no rebound and no guarding  No hernia  Musculoskeletal: He exhibits no edema or tenderness  Lumbar back: He exhibits normal range of motion, no tenderness, no bony tenderness, no swelling, no edema, no deformity, no pain and no spasm  Patient reports 5/10 paraspinal lumbar back pain but no tenderness appreciated on exam   Lymphadenopathy:     He has no cervical adenopathy  Neurological: He is alert and oriented to person, place, and time  He displays normal reflexes  No cranial nerve deficit or sensory deficit  He exhibits normal muscle tone  He displays a negative Romberg sign  Coordination normal    Pt appears to exhibit shaking of upper extremities  No asterixis appreciated  No clonus  Skin: Skin is warm  Capillary refill takes less than 2 seconds  Psychiatric: He has a normal mood and affect         Lab Results:   Results for orders placed or performed during the hospital encounter of 11/22/19   CBC and differential   Result Value Ref Range    WBC 6 30 4 31 - 10 16 Thousand/uL    RBC 4 55 3 88 - 5 62 Million/uL    Hemoglobin 14 9 12 0 - 17 0 g/dL    Hematocrit 45 9 36 5 - 49 3 %     (H) 82 - 98 fL    MCH 32 7 26 8 - 34 3 pg    MCHC 32 5 31 4 - 37 4 g/dL    RDW 13 8 11 6 - 15 1 %    MPV 10 1 8 9 - 12 7 fL    Platelets 285 085 - 502 Thousands/uL    nRBC 0 /100 WBCs    Neutrophils Relative 53 43 - 75 %    Immat GRANS % 0 0 - 2 %    Lymphocytes Relative 30 14 - 44 %    Monocytes Relative 13 (H) 4 - 12 %    Eosinophils Relative 2 0 - 6 % Basophils Relative 2 (H) 0 - 1 %    Neutrophils Absolute 3 37 1 85 - 7 62 Thousands/µL    Immature Grans Absolute 0 02 0 00 - 0 20 Thousand/uL    Lymphocytes Absolute 1 87 0 60 - 4 47 Thousands/µL    Monocytes Absolute 0 80 0 17 - 1 22 Thousand/µL    Eosinophils Absolute 0 13 0 00 - 0 61 Thousand/µL    Basophils Absolute 0 11 (H) 0 00 - 0 10 Thousands/µL   Protime-INR   Result Value Ref Range    Protime 10 4 9 8 - 12 0 seconds    INR 0 96 0 91 - 1 09   APTT   Result Value Ref Range    PTT 26 23 - 37 seconds   Comprehensive metabolic panel   Result Value Ref Range    Sodium 147 (H) 136 - 145 mmol/L    Potassium 3 7 3 5 - 5 3 mmol/L    Chloride 106 100 - 108 mmol/L    CO2 31 21 - 32 mmol/L    ANION GAP 10 4 - 13 mmol/L    BUN 18 5 - 25 mg/dL    Creatinine 0 91 0 60 - 1 30 mg/dL    Glucose 169 (H) 65 - 140 mg/dL    Calcium 8 3 8 3 - 10 1 mg/dL    AST 45 5 - 45 U/L    ALT 48 12 - 78 U/L    Alkaline Phosphatase 90 46 - 116 U/L    Total Protein 8 0 6 4 - 8 2 g/dL    Albumin 3 8 3 5 - 5 0 g/dL    Total Bilirubin 0 20 0 20 - 1 00 mg/dL    eGFR 93 ml/min/1 73sq m   Magnesium   Result Value Ref Range    Magnesium 1 8 1 6 - 2 6 mg/dL   Phosphorus   Result Value Ref Range    Phosphorus 2 8 2 7 - 4 5 mg/dL   Troponin I   Result Value Ref Range    Troponin I <0 02 <=0 04 ng/mL   Ethanol   Result Value Ref Range    Ethanol Lvl 402 (H) 0 - 3 mg/dL   UA w Reflex to Microscopic w Reflex to Culture   Result Value Ref Range    Color, UA Light Yellow     Clarity, UA Clear     Specific Gravity, UA 1 020 1 000 - 1 030    pH, UA 6 0 5 0, 5 5, 6 0, 6 5, 7 0, 7 5, 8 0, 8 5, 9 0    Leukocytes, UA Negative Negative    Nitrite, UA Negative Negative    Protein, UA Negative Negative mg/dl    Glucose, UA Negative Negative mg/dl    Ketones, UA Negative Negative mg/dl    Urobilinogen, UA 0 2 0 2, 1 0 E U /dl E U /dl    Bilirubin, UA Negative Negative    Blood, UA Trace-lysed (A) Negative   Rapid drug screen, urine   Result Value Ref Range Amph/Meth UR Negative Negative    Barbiturate Ur Negative Negative    Benzodiazepine Urine Negative Negative    Cocaine Urine Negative Negative    Methadone Urine Negative Negative    Opiate Urine Negative Negative    PCP Ur Negative Negative    THC Urine Negative Negative   Urine Microscopic   Result Value Ref Range    RBC, UA 1-2 (A) None Seen, 0-5 /hpf    WBC, UA None Seen None Seen, 0-5, 5-55, 5-65 /hpf    Epithelial Cells None Seen None Seen, Occasional /hpf    Bacteria, UA None Seen None Seen, Occasional /hpf       Imaging:   XR chest 1 view portable    (Results Pending)   XR lumbar spine 2 or 3 views    (Results Pending)       EKG, Pathology, and Other Studies: Sinus rhythm, rate 87, normal axis, normal intervals, low amplitude T   is noted throughout, T-wave inversions noted in V4 through V6 suggesting   anterior lateral ischemia that is new compared to previous study   No   acute ST elevations noted  Code Status: Prior  Advance Directive and Living Will:      Power of :    POLST:      Counseling / Coordination of Care:   Greater than 50% of total time was spent with the patient and / or family counseling and / or coordination of care  A description of the counseling / coordination of care: assessment and plan discussed with attending physician

## 2019-11-23 NOTE — PLAN OF CARE
Problem: Potential for Falls  Goal: Patient will remain free of falls  Description  INTERVENTIONS:  - Assess patient frequently for physical needs  -  Identify cognitive and physical deficits and behaviors that affect risk of falls  -  [place] fall precautions as indicated by assessment   - Educate patient/family on patient safety including physical limitations  - Instruct patient to call for assistance with activity based on assessment  - Modify environment to reduce risk of injury  - Consider OT/PT consult to assist with strengthening/mobility  Outcome: Progressing     Problem: Prexisting or High Potential for Compromised Skin Integrity  Goal: Skin integrity is maintained or improved  Description  INTERVENTIONS:  - Identify patients at risk for skin breakdown  - Assess and monitor skin integrity  - Assess and monitor nutrition and hydration status  - Monitor labs   - Assess for incontinence   - Turn and reposition patient  - Assist with mobility/ambulation  - Relieve pressure over bony prominences  - Avoid friction and shearing  - Provide appropriate hygiene as needed including keeping skin clean and dry  - Evaluate need for skin moisturizer/barrier cream  - Collaborate with interdisciplinary team   - Patient/family teaching  - Consider wound care consult   Outcome: Progressing     Problem: Nutrition/Hydration-ADULT  Goal: Nutrient/Hydration intake appropriate for improving, restoring or maintaining nutritional needs  Description  Monitor and assess patient's nutrition/hydration status for malnutrition  Collaborate with interdisciplinary team and initiate plan and interventions as ordered  Monitor patient's weight and dietary intake as ordered or per policy  Utilize nutrition screening tool and intervene as necessary  Determine patient's food preferences and provide high-protein, high-caloric foods as appropriate       INTERVENTIONS:  - Monitor oral intake, urinary output, labs, and treatment plans  - Assess nutrition and hydration status and recommend course of action  - Evaluate amount of meals eaten  - Assist patient with eating if necessary   - Allow adequate time for meals  - Recommend/ encourage appropriate diets, oral nutritional supplements, and vitamin/mineral supplements  - Order, calculate, and assess calorie counts as needed  - Recommend, monitor, and adjust tube feedings and TPN/PPN based on assessed needs  - Assess need for intravenous fluids  - Provide specific nutrition/hydration education as appropriate  - Include patient/family/caregiver in decisions related to nutrition  Outcome: Progressing     Problem: PAIN - ADULT  Goal: Verbalizes/displays adequate comfort level or baseline comfort level  Description  Interventions:  - Encourage patient to monitor pain and request assistance  - Assess pain using appropriate pain scale  - Administer analgesics based on type and severity of pain and evaluate response  - Implement non-pharmacological measures as appropriate and evaluate response  - Consider cultural and social influences on pain and pain management  - Notify physician/advanced practitioner if interventions unsuccessful or patient reports new pain  Outcome: Progressing     Problem: INFECTION - ADULT  Goal: Absence or prevention of progression during hospitalization  Description  INTERVENTIONS:  - Assess and monitor for signs and symptoms of infection  - Monitor lab/diagnostic results  - Monitor all insertion sites, i e  indwelling lines, tubes, and drains  - [place] appropriate cooling/warming therapies per order  - Administer medications as ordered  - Instruct and encourage patient and family to use good hand hygiene technique  - Identify and instruct in appropriate isolation precautions for identified infection/condition   Outcome: Progressing     Problem: SAFETY ADULT  Goal: Maintain or return to baseline ADL function  Description  INTERVENTIONS:  -  Assess patient's ability to carry out ADLs; assess patient's baseline for ADL function and identify physical deficits which impact ability to perform ADLs (bathing, care of mouth/teeth, toileting, grooming, dressing, etc )  - Assess/evaluate cause of self-care deficits   - Assess range of motion  - Assess patient's mobility; develop plan if impaired  - Assess patient's need for assistive devices and provide as appropriate  - Encourage maximum independence but intervene and supervise when necessary  - Involve family in performance of ADLs  - Assess for home care needs following discharge   - Consider OT consult to assist with ADL evaluation and planning for discharge  - Provide patient education as appropriate  Outcome: Progressing  Goal: Maintain or return mobility status to optimal level  Description  INTERVENTIONS:  - Assess patient's baseline mobility status (ambulation, transfers, stairs, etc )    - Identify cognitive and physical deficits and behaviors that affect mobility  - Identify mobility aids required to assist with transfers and/or ambulation (gait belt, sit-to-stand, lift, walker, cane, etc )  - [place] fall precautions as indicated by assessment  - Record patient progress and toleration of activity level on Mobility SBAR; progress patient to next Phase/Stage  - Instruct patient to call for assistance with activity based on assessment  - Consider rehabilitation consult to assist with strengthening/weightbearing, etc   Outcome: Progressing     Problem: DISCHARGE PLANNING  Goal: Discharge to home or other facility with appropriate resources  Description  INTERVENTIONS:  - Identify barriers to discharge w/patient and caregiver  - Arrange for needed discharge resources and transportation as appropriate  - Identify discharge learning needs (meds, wound care, etc )  - Arrange for interpretive services to assist at discharge as needed  - Refer to Case Management Department for coordinating discharge planning if the patient needs post-hospital services based on physician/advanced practitioner order or complex needs related to functional status, cognitive ability, or social support system  Outcome: Progressing     Problem: Knowledge Deficit  Goal: Patient/family/caregiver demonstrates understanding of disease process, treatment plan, medications, and discharge instructions  Description  Complete learning assessment and assess knowledge base    Interventions:  - Provide teaching at level of understanding  - Provide teaching via preferred learning methods  Outcome: Progressing     Problem: METABOLIC, FLUID AND ELECTROLYTES - ADULT  Goal: Electrolytes maintained within normal limits  Description  INTERVENTIONS:  - Monitor labs and assess patient for signs and symptoms of electrolyte imbalances  - Administer electrolyte replacement as ordered  - Monitor response to electrolyte replacements, including repeat lab results as appropriate  - Instruct patient on fluid and nutrition as appropriate  Outcome: Progressing  Goal: Fluid balance maintained  Description  INTERVENTIONS:  - Monitor labs   - Monitor I/O and WT  - Instruct patient on fluid and nutrition as appropriate  - Assess for signs & symptoms of volume excess or deficit  Outcome: Progressing  Goal: Glucose maintained within target range  Description  INTERVENTIONS:  - Monitor Blood Glucose as ordered  - Assess for signs and symptoms of hyperglycemia and hypoglycemia  - Administer ordered medications to maintain glucose within target range  - Assess nutritional intake and initiate nutrition service referral as needed  Outcome: Progressing     Problem: RESPIRATORY - ADULT  Goal: Achieves optimal ventilation and oxygenation  Description  INTERVENTIONS:  - Assess for changes in respiratory status  - Assess for changes in mentation and behavior  - Position to facilitate oxygenation and minimize respiratory effort  - Oxygen administered by appropriate delivery if ordered  - Initiate smoking cessation education as indicated  - Encourage broncho-pulmonary hygiene including cough, deep breathe, Incentive Spirometry  - Assess the need for suctioning and aspirate as needed  - Assess and instruct to report SOB or any respiratory difficulty  - Respiratory Therapy support as indicated  Outcome: Progressing

## 2019-11-23 NOTE — ED PROVIDER NOTES
History  Chief Complaint   Patient presents with    Back Pain     bilat back pain for several days  extremely under influence of alcohol on arrival, very unsteady, bottle of alcohol in his bag     16-year-old male with past medical history of heavy alcohol abuse, diabetes, hypertension, hyperlipidemia, pyelonephritis, COPD, prostate cancer, CAD, presents to the ED with complaint of bilateral flank pain over the past 2-3 days  Patient states that he did lift a heavy jackhammer that weight 60 lb few days ago  Otherwise patient denies any fall  Patient states that he drinks about a qt of vodka on a daily basis  Patient came to the ED for further evaluation of his back pain and also requesting rehab placement to help quit his alcoholism  Patient's last drink was this morning  Patient had a bottle of vodka with him in triage  Patient denies any dysuria, hematuria, increased urinary frequency  Patient appears to be intoxicated during exam       History provided by:  Patient  Back Pain   Associated symptoms: no abdominal pain, no chest pain, no dysuria, no fever, no headaches and no weakness        Prior to Admission Medications   Prescriptions Last Dose Informant Patient Reported? Taking?    Blood Glucose Monitoring Suppl (ONE TOUCH ULTRA MINI) w/Device KIT Unknown at Unknown time  Yes No   Sig: Use as directed   ONE TOUCH ULTRA TEST test strip Unknown at Unknown time  Yes No   Sig: 3 (three) times a day Test   [facility] Unknown at Unknown time  Yes No   Sig: 3 (three) times a day Test   TAMSULOSIN HCL PO Unknown at Unknown time Self Yes No   Sig: Take 0 4 mg by mouth daily   albuterol (PROVENTIL HFA,VENTOLIN HFA) 90 mcg/act inhaler More than a month at Unknown time  No No   Sig: Inhale 2 puffs every 4 (four) hours as needed for wheezing   amLODIPine (NORVASC) 5 mg tablet Unknown at Unknown time  Yes No   Sig: Take 5 mg by mouth daily   chlordiazePOXIDE (LIBRIUM) 10 mg capsule Unknown at Unknown time  Yes No   Sig: Take 10 mg by mouth 2 (two) times a day   clopidogrel (PLAVIX) 75 mg tablet Unknown at Unknown time  No No   Sig: Take 1 tablet (75 mg total) by mouth daily   esomeprazole (NexIUM) 40 MG capsule Unknown at Unknown time  Yes No   famotidine (PEPCID) 20 mg tablet Not Taking at Unknown time  Yes No   Sig: Take 20 mg by mouth 2 (two) times a day   fluticasone-salmeterol (ADVAIR) 500-50 mcg/dose Unknown at Unknown time  Yes No   Sig: Inhale 1 puff every 12 (twelve) hours   folic acid (FOLVITE) 1 mg tablet Not Taking at Unknown time  No No   Sig: Take 1 tablet (1 mg total) by mouth daily   Patient not taking: Reported on 11/22/2019   fosinopril (MONOPRIL) 10 mg tablet Unknown at Unknown time  Yes No   Sig: Take 10 mg by mouth daily    magnesium Oxide (MAG-OX) 400 mg TABS Unknown at Unknown time  Yes No   Sig: Take 400 mg by mouth daily   magnesium oxide (MAG-OX) 400 mg Not Taking at Unknown time  No No   Sig: Take 1 tablet (400 mg total) by mouth daily   Patient not taking: Reported on 11/22/2019   metFORMIN (GLUCOPHAGE) 1000 MG tablet Unknown at Unknown time  Yes No   Sig: Take 1,000 mg by mouth 2 (two) times a day with meals   metoprolol succinate (TOPROL-XL) 100 mg 24 hr tablet Unknown at Unknown time  No No   Sig: Take 1 tablet (100 mg total) by mouth daily   Patient taking differently: Take 50 mg by mouth daily    ranolazine (RANEXA) 500 mg 12 hr tablet Unknown at Unknown time  No No   Sig: Take 1 tablet (500 mg total) by mouth 2 (two) times a day   rosuvastatin (CRESTOR) 20 MG tablet Unknown at Unknown time  No No   Sig: Take 1 tablet (20 mg total) by mouth daily   sertraline (ZOLOFT) 100 mg tablet Unknown at Unknown time  Yes No   Sig: Take 100 mg by mouth daily    tiotropium (SPIRIVA) 18 mcg inhalation capsule Unknown at Unknown time Self Yes No   Sig: Place 18 mcg into inhaler and inhale daily      Facility-Administered Medications: None       Past Medical History:   Diagnosis Date    [facility])     prostate ca,had radiation    Cardiac disease     stents,then triple bypass    [facility]     [facility] mellitus ([facility]     ETOH abuse     Hx of heart artery stent     2014    Hyperlipidemia     Hypertension     Prostate CA ([facility] )     Renal disorder     pyelonephritis    S/P CABG x 1     2004       Past Surgical History:   Procedure Laterality Date    CORONARY ARTERY BYPASS GRAFT  2004    TONSILLECTOMY         Family History   Problem Relation Age of Onset    Diabetes Mother     Uterine cancer Mother     COPD Father     Hypertension Father      I have reviewed and agree with the history as documented  Social History     Tobacco Use    Smoking status: Current Every Day Smoker     Packs/day: 1 50     Years: 40 00     Pack years: 60 00    Smokeless tobacco: Never Used   Substance Use Topics    Alcohol use: Yes     Frequency: 4 or more times a week     Drinks per session: 10 or more     Comment: 1/5 of vodka per day    Drug use: No        Review of Systems   Constitutional: Negative for activity change, fatigue and fever  HENT: Negative for congestion, ear discharge and sore throat  Eyes: Negative for pain and redness  Respiratory: Negative for cough, chest tightness, shortness of breath and wheezing  Cardiovascular: Negative for chest pain  Gastrointestinal: Negative for abdominal pain, diarrhea, nausea and vomiting  Endocrine: Negative for cold intolerance  Genitourinary: Negative for dysuria and urgency  Musculoskeletal: Positive for back pain  Negative for arthralgias  Neurological: Negative for dizziness, weakness and headaches  Psychiatric/Behavioral: Negative for agitation and behavioral problems  Physical Exam  Physical Exam   Constitutional: He is oriented to person, place, and time  Disheveled appearance  Breath smells heavily of alcohol  HENT:   Head: Normocephalic and atraumatic     Nose: Nose normal  Mouth/Throat: Oropharynx is clear and moist    Eyes: Conjunctivae and EOM are normal    Neck: Normal range of motion  Neck supple  Cardiovascular: Normal rate, regular rhythm and normal heart sounds  Pulmonary/Chest: Effort normal and breath sounds normal    Abdominal: Soft  Bowel sounds are normal  He exhibits no distension  There is no tenderness  Musculoskeletal: Normal range of motion  Neurological: He is alert and oriented to person, place, and time  Skin: Skin is warm  Psychiatric: He has a normal mood and affect  His behavior is normal  Judgment and thought content normal    Nursing note and vitals reviewed        Vital Signs  ED Triage Vitals [11/22/19 1918]   Temperature Pulse Respirations Blood Pressure SpO2   98 4 °F (36 9 °C) 98 (!) 24 139/82 95 %      Temp Source Heart Rate Source Patient Position - Orthostatic VS BP Location FiO2 (%)   Tympanic Monitor Sitting Left arm --      Pain Score       8           Vitals:    11/22/19 1918 11/22/19 2248 11/22/19 2318   BP: 139/82 129/66 156/98   Pulse: 98 78 86   Patient Position - Orthostatic VS: Sitting Lying Lying         Visual Acuity      ED Medications  Medications   metFORMIN (GLUCOPHAGE) tablet 1,000 mg (has no administration in time range)   amLODIPine (NORVASC) tablet 5 mg (has no administration in time range)   sertraline (ZOLOFT) tablet 100 mg (has no administration in time range)   famotidine (PEPCID) tablet 20 mg (has no administration in time range)   fluticasone-vilanterol (BREO ELLIPTA) 100-25 mcg/inh inhaler 1 puff (has no administration in time range)   albuterol (PROVENTIL HFA,VENTOLIN HFA) inhaler 2 puff (has no administration in time range)   tamsulosin (FLOMAX) capsule 0 4 mg (has no administration in time range)   tiotropium (SPIRIVA) capsule for inhaler 18 mcg (has no administration in time range)   clopidogrel (PLAVIX) tablet 75 mg (has no administration in time range)   pravastatin (PRAVACHOL) tablet 40 mg (has no administration in time range)   lisinopril (ZESTRIL) tablet 10 mg (has no administration in time range)   folic acid (FOLVITE) tablet 1 mg (has no administration in time range)   ranolazine (RANEXA) 12 hr tablet 500 mg (has no administration in time range)   metoprolol succinate (TOPROL-XL) 24 hr tablet 50 mg (has no administration in time range)   magnesium oxide (MAG-OX) tablet 400 mg (has no administration in time range)   pantoprazole (PROTONIX) EC tablet 20 mg (has no administration in time range)   chlordiazePOXIDE (LIBRIUM) capsule 10 mg (has no administration in time range)   enoxaparin (LOVENOX) subcutaneous injection 40 mg (has no administration in time range)   sodium chloride 0 9 % bolus 1,000 mL (0 mL Intravenous Stopped 11/22/19 2136)   thiamine (VITAMIN B1) 100 mg in sodium chloride 0 9 % 50 mL IVPB (0 mg Intravenous Stopped 73/51/57 3318)   folic acid 1 mg in sodium chloride 0 9 % 50 mL IVPB (0 mg Intravenous Stopped 11/22/19 2124)   ipratropium (ATROVENT) 0 02 % inhalation solution 0 5 mg (0 5 mg Nebulization Given 11/22/19 2041)   albuterol inhalation solution 5 mg (5 mg Nebulization Given 11/22/19 2041)   methylPREDNISolone sodium succinate (Solu-MEDROL) injection 60 mg (60 mg Intravenous Given 11/22/19 2040)       Diagnostic Studies  Results Reviewed     Procedure Component Value Units Date/Time    Rapid drug screen, urine [[id number]]  (Normal) Collected:  11/22/19 2231    Lab Status:  Final result Specimen:  Urine, Clean Catch Updated:  11/22/19 2252     Amph/Meth UR Negative     Barbiturate Ur Negative     Benzodiazepine Urine Negative     Cocaine Urine Negative     Methadone Urine Negative     Opiate Urine Negative     PCP Ur Negative     THC Urine Negative    Narrative:       FOR MEDICAL PURPOSES ONLY  IF CONFIRMATION NEEDED PLEASE CONTACT THE LAB WITHIN 5 DAYS      Drug Screen Cutoff Levels:  AMPHETAMINE/METHAMPHETAMINES  1000 ng/mL  BARBITURATES     200 ng/mL  BENZODIAZEPINES     200 ng/mL  COCAINE      300 ng/mL  METHADONE      300 ng/mL  OPIATES      300 ng/mL  PHENCYCLIDINE     25 ng/mL  THC       50 ng/mL      Urine Microscopic [[id number]]  (Abnormal) Collected:  11/22/19 2231    Lab Status:  Final result Specimen:  Urine, Clean Catch Updated:  11/22/19 2249     RBC, UA 1-2 /hpf      WBC, UA None Seen /hpf      Epithelial Cells None Seen /hpf      Bacteria, UA None Seen /hpf     UA w Reflex to Microscopic w Reflex to Culture [[id number]]  (Abnormal) Collected:  11/22/19 2231    Lab Status:  Final result Specimen:  Urine, Clean Catch Updated:  11/22/19 2241     Color, UA Light Yellow     Clarity, UA Clear     Specific [place], UA 1 020     pH, UA 6 0     Leukocytes, UA Negative     Nitrite, UA Negative     Protein, UA Negative mg/dl      Glucose, UA Negative mg/dl      Ketones, UA Negative mg/dl      Urobilinogen, UA 0 2 E U /dl      Bilirubin, UA Negative     Blood, UA Trace-lysed    Ethanol [[id number]]  (Abnormal) Collected:  11/22/19 2024    Lab Status:  Final result Specimen:  Blood from Vein Updated:  11/22/19 2102     Ethanol Lvl 402 mg/dL     Troponin I [[id number]]  (Normal) Collected:  11/22/19 2024    Lab Status:  Final result Specimen:  Blood from Vein Updated:  11/22/19 2100     Troponin I <0 02 ng/mL     Comprehensive metabolic panel [[id number]]  (Abnormal) Collected:  11/22/19 2024    Lab Status:  Final result Specimen:  Blood from Vein Updated:  11/22/19 2055     Sodium 147 mmol/L      Potassium 3 7 mmol/L      Chloride 106 mmol/L      CO2 31 mmol/L      ANION GAP 10 mmol/L      BUN 18 mg/dL      Creatinine 0 91 mg/dL      Glucose 169 mg/dL      Calcium 8 3 mg/dL      AST 45 U/L      ALT 48 U/L      Alkaline Phosphatase 90 U/L      Total Protein 8 0 g/dL      Albumin 3 8 g/dL      Total Bilirubin 0 20 mg/dL      eGFR 93 ml/min/1 73sq m     Narrative:       [place] guidelines for Chronic Kidney Disease (CKD):     Stage 1 with normal or high GFR (GFR > 90 mL/min/1 73 square meters)    Stage 2 Mild CKD (GFR = 60-89 mL/min/1 73 square meters)    Stage 3A Moderate CKD (GFR = 45-59 mL/min/1 73 square meters)    Stage 3B Moderate CKD (GFR = 30-44 mL/min/1 73 square meters)    Stage 4 Severe CKD (GFR = 15-29 mL/min/1 73 square meters)    Stage 5 End Stage CKD (GFR <15 mL/min/1 73 square meters)  Note: GFR calculation is accurate only with a steady state creatinine    Magnesium [[id number]]  (Normal) Collected:  11/22/19 2024    Lab Status:  Final result Specimen:  Blood from Vein Updated:  11/22/19 2055     Magnesium 1 8 mg/dL     Phosphorus [[id number]]  (Normal) Collected:  11/22/19 2024    Lab Status:  Final result Specimen:  Blood from Vein Updated:  11/22/19 2055     Phosphorus 2 8 mg/dL     Protime-INR [[id number]]  (Normal) Collected:  11/22/19 2024    Lab Status:  Final result Specimen:  Blood from Vein Updated:  11/22/19 2052     Protime 10 4 seconds      INR 0 96    APTT [[id number]]  (Normal) Collected:  11/22/19 2024    Lab Status:  Final result Specimen:  Blood from Vein Updated:  11/22/19 2052     PTT 26 seconds     CBC and differential [[id number]]  (Abnormal) Collected:  11/22/19 2024    Lab Status:  Final result Specimen:  Blood from Vein Updated:  11/22/19 2039     WBC 6 30 Thousand/uL      RBC 4 55 Million/uL      Hemoglobin 14 9 g/dL      Hematocrit 45 9 %       fL      MCH 32 7 pg      MCHC 32 5 g/dL      RDW 13 8 %      MPV 10 1 fL      Platelets 329 Thousands/uL      nRBC 0 /100 WBCs      Neutrophils Relative 53 %      Immat GRANS % 0 %      Lymphocytes Relative 30 %      Monocytes Relative 13 %      Eosinophils Relative 2 %      Basophils Relative 2 %      Neutrophils Absolute 3 37 Thousands/µL      Immature Grans Absolute 0 02 Thousand/uL      Lymphocytes Absolute 1 87 Thousands/µL      Monocytes Absolute 0 80 Thousand/µL      Eosinophils Absolute 0 13 Thousand/µL      Basophils Absolute 0 11 Thousands/µL                  XR chest 1 view portable    (Results Pending)   XR lumbar spine 2 or 3 views    (Results Pending)              Procedures  ECG 12 Lead Documentation Only  Date/Time: 11/22/2019 8:36 PM  Performed by: Annita Carey DO  Authorized by: Annita Carey DO     Indications / Diagnosis:  Back pain  ECG reviewed by me, the ED Provider: yes    Patient location:  ED  Previous ECG:     Previous ECG:  Compared to current    Similarity:  No change    Comparison to cardiac monitor: Yes    Interpretation:     Interpretation: non-specific    Comments:      Sinus rhythm, rate 87, normal axis, normal intervals, low amplitude T is noted throughout, T-wave inversions noted in V4 through V6 suggesting anterior lateral ischemia that is new compared to previous study  No acute ST elevations noted  ED Course  ED Course as of Nov 22 2338 Fri Nov 22, 2019   1950   MDM  Number of Diagnoses or Management Options  Alcohol intoxication ([facility] [address] ): new and requires workup  Chronic alcohol abuse: new and requires workup  Low back pain: new and requires workup  Diagnosis management comments: Obtain blood work, blood alcohol level, UA, UDS, chest x-ray, EKG, x-ray of lumbar spine  Give banana bag and reassess symptoms       Amount and/or Complexity of Data Reviewed  Clinical lab tests: ordered and reviewed  Tests in the radiology section of CPT®: ordered and reviewed  Tests in the medicine section of CPT®: ordered and reviewed  Review and summarize past medical records: yes  Independent visualization of images, tracings, or specimens: yes    Risk of Complications, Morbidity, and/or Mortality  General comments: X-ray of lumbar spine was unremarkable for any acute bony abnormalities  Patient's blood alcohol level was 402 in the ED  Patient's sodium was also elevated to 146 which shows concern for mild dehydration  Patient received banana bag in the ED    Patient is admitted for further evaluation and management of alcohol intoxication  Patient agrees with admission plans  Patient Progress  Patient progress: stable      Disposition  Final diagnoses:   Alcohol intoxication ([address] )   Low back pain   Chronic alcohol abuse     Time reflects when diagnosis was documented in both MDM as applicable and the Disposition within this note     Time User Action Codes Description Comment    11/22/2019  9:41 PM Princella Powell Add [F10 929] Alcohol intoxication ([address] )     11/22/2019  9:41 PM Princella Powell Add [M54 5] Low back pain     11/22/2019  9:42 PM Princella Powell Add [F10 10] Chronic alcohol abuse       ED Disposition     ED Disposition Condition Date/Time Comment    Admit Stable Fri Nov 22, 2019  9:41 PM Case was discussed with Dr Burgess Hughes and the patient's admission status was agreed to be Admission Status: inpatient status to the service of Dr Burgess Hughes          Follow-up Information    None         Current Discharge Medication List      CONTINUE these medications which have NOT CHANGED    Details   albuterol (PROVENTIL HFA,VENTOLIN HFA) 90 mcg/act inhaler Inhale 2 puffs every 4 (four) hours as needed for wheezing  Qty: 1 Inhaler, Refills: 0    Associated Diagnoses: COPD (chronic obstructive pulmonary disease) (HCC)      amLODIPine (NORVASC) 5 mg tablet Take 5 mg by mouth daily      Blood Glucose Monitoring Suppl (ONE TOUCH ULTRA MINI) w/Device KIT Use as directed  Refills: 0      chlordiazePOXIDE (LIBRIUM) 10 mg capsule Take 10 mg by mouth 2 (two) times a day      clopidogrel (PLAVIX) 75 mg tablet Take 1 tablet (75 mg total) by mouth daily  Qty: 30 tablet, Refills: 0    Associated Diagnoses: CAD (coronary artery disease)      esomeprazole (NexIUM) 40 MG capsule Refills: 0      famotidine (PEPCID) 20 mg tablet Take 20 mg by mouth 2 (two) times a day      fluticasone-salmeterol (ADVAIR) 500-50 mcg/dose Inhale 1 puff every 12 (twelve) hours      folic acid (FOLVITE) 1 mg tablet Take 1 tablet (1 mg total) by mouth daily  Qty: 30 tablet, Refills: 0    Associated Diagnoses: Alcohol intoxication ([address] )      fosinopril (MONOPRIL) 10 mg tablet Take 10 mg by mouth daily   Refills: 0      !! magnesium Oxide (MAG-OX) 400 mg TABS Take 400 mg by mouth daily  Refills: 0      !! magnesium oxide (MAG-OX) 400 mg Take 1 tablet (400 mg total) by mouth daily  Qty: 30 tablet, Refills: 0    Associated Diagnoses: Hypomagnesemia      metFORMIN (GLUCOPHAGE) 1000 MG tablet Take 1,000 mg by mouth 2 (two) times a day with meals      metoprolol succinate (TOPROL-XL) 100 mg 24 hr tablet Take 1 tablet (100 mg total) by mouth daily  Qty: 90 tablet, Refills: 3    Associated Diagnoses: CAD (coronary artery disease)      ONE TOUCH ULTRA TEST test strip 3 (three) times a day Test  Refills: 0      ONETOUCH DELICA LANCETS FINE MISC 3 (three) times a day Test  Refills: 0      ranolazine (RANEXA) 500 mg 12 hr tablet Take 1 tablet (500 mg total) by mouth 2 (two) times a day  Qty: 60 tablet, Refills: 0    Associated Diagnoses: CAD (coronary artery disease)      rosuvastatin (CRESTOR) 20 MG tablet Take 1 tablet (20 mg total) by mouth daily  Qty: 30 tablet, Refills: 0    Associated Diagnoses: HLD (hyperlipidemia)      sertraline (ZOLOFT) 100 mg tablet Take 100 mg by mouth daily       TAMSULOSIN HCL PO Take 0 4 mg by mouth daily      tiotropium (SPIRIVA) 18 mcg inhalation capsule Place 18 mcg into inhaler and inhale daily       ! ! - Potential duplicate medications found  Please discuss with provider  No discharge procedures on file      ED Provider  Electronically Signed by           Kaylynn Amos DO  11/22/19 7534

## 2019-11-24 LAB
ANION GAP SERPL CALCULATED.3IONS-SCNC: 7 MMOL/L (ref 4–13)
BUN SERPL-MCNC: 24 MG/DL (ref 5–25)
CALCIUM SERPL-MCNC: 8.8 MG/DL (ref 8.3–10.1)
CHLORIDE SERPL-SCNC: 101 MMOL/L (ref 100–108)
CO2 SERPL-SCNC: 31 MMOL/L (ref 21–32)
CREAT SERPL-MCNC: 0.94 MG/DL (ref 0.6–1.3)
ERYTHROCYTE [DISTWIDTH] IN BLOOD BY AUTOMATED COUNT: 13.3 % (ref 11.6–15.1)
GFR SERPL CREATININE-BSD FRML MDRD: 90 ML/MIN/1.73SQ M
GLUCOSE SERPL-MCNC: 93 MG/DL (ref 65–140)
HCT VFR BLD AUTO: 40.8 % (ref 36.5–49.3)
HGB BLD-MCNC: 13.4 G/DL (ref 12–17)
MAGNESIUM SERPL-MCNC: 1.6 MG/DL (ref 1.6–2.6)
MCH RBC QN AUTO: 32.8 PG (ref 26.8–34.3)
MCHC RBC AUTO-ENTMCNC: 32.8 G/DL (ref 31.4–37.4)
MCV RBC AUTO: 100 FL (ref 82–98)
PHOSPHATE SERPL-MCNC: 2.8 MG/DL (ref 2.7–4.5)
PLATELET # BLD AUTO: 126 THOUSANDS/UL (ref 149–390)
PMV BLD AUTO: 11.3 FL (ref 8.9–12.7)
POTASSIUM SERPL-SCNC: 3.3 MMOL/L (ref 3.5–5.3)
RBC # BLD AUTO: 4.08 MILLION/UL (ref 3.88–5.62)
SODIUM SERPL-SCNC: 139 MMOL/L (ref 136–145)
WBC # BLD AUTO: 8.89 THOUSAND/UL (ref 4.31–10.16)

## 2019-11-24 PROCEDURE — 94760 N-INVAS EAR/PLS OXIMETRY 1: CPT

## 2019-11-24 PROCEDURE — 84100 ASSAY OF PHOSPHORUS: CPT | Performed by: STUDENT IN AN ORGANIZED HEALTH CARE EDUCATION/TRAINING PROGRAM

## 2019-11-24 PROCEDURE — ND001 PR NO DOCUMENTATION: Performed by: FAMILY MEDICINE

## 2019-11-24 PROCEDURE — 94640 AIRWAY INHALATION TREATMENT: CPT

## 2019-11-24 PROCEDURE — 83735 ASSAY OF MAGNESIUM: CPT | Performed by: STUDENT IN AN ORGANIZED HEALTH CARE EDUCATION/TRAINING PROGRAM

## 2019-11-24 PROCEDURE — 85027 COMPLETE CBC AUTOMATED: CPT | Performed by: STUDENT IN AN ORGANIZED HEALTH CARE EDUCATION/TRAINING PROGRAM

## 2019-11-24 PROCEDURE — 94762 N-INVAS EAR/PLS OXIMTRY CONT: CPT

## 2019-11-24 PROCEDURE — 80048 BASIC METABOLIC PNL TOTAL CA: CPT | Performed by: STUDENT IN AN ORGANIZED HEALTH CARE EDUCATION/TRAINING PROGRAM

## 2019-11-24 RX ORDER — IPRATROPIUM BROMIDE AND ALBUTEROL SULFATE 2.5; .5 MG/3ML; MG/3ML
3 SOLUTION RESPIRATORY (INHALATION)
Status: DISCONTINUED | OUTPATIENT
Start: 2019-11-25 | End: 2019-11-25

## 2019-11-24 RX ORDER — IPRATROPIUM BROMIDE AND ALBUTEROL SULFATE 2.5; .5 MG/3ML; MG/3ML
3 SOLUTION RESPIRATORY (INHALATION)
Status: DISCONTINUED | OUTPATIENT
Start: 2019-11-24 | End: 2019-11-24

## 2019-11-24 RX ORDER — POTASSIUM CHLORIDE 20 MEQ/1
40 TABLET, EXTENDED RELEASE ORAL ONCE
Status: COMPLETED | OUTPATIENT
Start: 2019-11-24 | End: 2019-11-24

## 2019-11-24 RX ORDER — MAGNESIUM SULFATE HEPTAHYDRATE 40 MG/ML
2 INJECTION, SOLUTION INTRAVENOUS ONCE
Status: COMPLETED | OUTPATIENT
Start: 2019-11-24 | End: 2019-11-24

## 2019-11-24 RX ADMIN — LISINOPRIL 10 MG: 10 TABLET ORAL at 08:11

## 2019-11-24 RX ADMIN — TIOTROPIUM BROMIDE 18 MCG: 18 CAPSULE ORAL; RESPIRATORY (INHALATION) at 08:10

## 2019-11-24 RX ADMIN — MAGNESIUM SULFATE HEPTAHYDRATE 2 G: 40 INJECTION, SOLUTION INTRAVENOUS at 08:57

## 2019-11-24 RX ADMIN — RANOLAZINE 500 MG: 500 TABLET, FILM COATED, EXTENDED RELEASE ORAL at 08:12

## 2019-11-24 RX ADMIN — CHLORDIAZEPOXIDE HYDROCHLORIDE 50 MG: 25 CAPSULE ORAL at 17:46

## 2019-11-24 RX ADMIN — METFORMIN HYDROCHLORIDE 1000 MG: 500 TABLET ORAL at 17:47

## 2019-11-24 RX ADMIN — CLOPIDOGREL BISULFATE 75 MG: 75 TABLET ORAL at 08:11

## 2019-11-24 RX ADMIN — FAMOTIDINE 20 MG: 20 TABLET ORAL at 17:47

## 2019-11-24 RX ADMIN — TAMSULOSIN HYDROCHLORIDE 0.4 MG: 0.4 CAPSULE ORAL at 08:11

## 2019-11-24 RX ADMIN — CHLORDIAZEPOXIDE HYDROCHLORIDE 50 MG: 25 CAPSULE ORAL at 05:50

## 2019-11-24 RX ADMIN — POTASSIUM CHLORIDE 40 MEQ: 1500 TABLET, EXTENDED RELEASE ORAL at 08:12

## 2019-11-24 RX ADMIN — SERTRALINE HYDROCHLORIDE 100 MG: 100 TABLET ORAL at 08:12

## 2019-11-24 RX ADMIN — PRAVASTATIN SODIUM 40 MG: 40 TABLET ORAL at 17:47

## 2019-11-24 RX ADMIN — NICOTINE 21 MG: 21 PATCH, EXTENDED RELEASE TRANSDERMAL at 08:07

## 2019-11-24 RX ADMIN — IPRATROPIUM BROMIDE AND ALBUTEROL SULFATE 3 ML: 2.5; .5 SOLUTION RESPIRATORY (INHALATION) at 14:01

## 2019-11-24 RX ADMIN — CHLORDIAZEPOXIDE HYDROCHLORIDE 50 MG: 25 CAPSULE ORAL at 23:51

## 2019-11-24 RX ADMIN — FLUTICASONE FUROATE AND VILANTEROL TRIFENATATE 1 PUFF: 100; 25 POWDER RESPIRATORY (INHALATION) at 08:09

## 2019-11-24 RX ADMIN — AMLODIPINE BESYLATE 5 MG: 5 TABLET ORAL at 08:12

## 2019-11-24 RX ADMIN — ENOXAPARIN SODIUM 40 MG: 40 INJECTION SUBCUTANEOUS at 08:11

## 2019-11-24 RX ADMIN — FOLIC ACID 1 MG: 1 TABLET ORAL at 08:11

## 2019-11-24 RX ADMIN — METFORMIN HYDROCHLORIDE 1000 MG: 500 TABLET ORAL at 08:11

## 2019-11-24 RX ADMIN — METOPROLOL SUCCINATE 50 MG: 50 TABLET, EXTENDED RELEASE ORAL at 08:12

## 2019-11-24 RX ADMIN — Medication 100 MG: at 08:11

## 2019-11-24 RX ADMIN — FAMOTIDINE 20 MG: 20 TABLET ORAL at 08:12

## 2019-11-24 RX ADMIN — RANOLAZINE 500 MG: 500 TABLET, FILM COATED, EXTENDED RELEASE ORAL at 17:47

## 2019-11-24 RX ADMIN — CHLORDIAZEPOXIDE HYDROCHLORIDE 50 MG: 25 CAPSULE ORAL at 11:49

## 2019-11-24 NOTE — ASSESSMENT & PLAN NOTE
Stable  Can do pepcid for now   Holding nexium may not need PPI and would not be good for him given macrocytosis as PPI's can lead to b12 deficiency

## 2019-11-24 NOTE — PLAN OF CARE
Problem: Potential for Falls  Goal: Patient will remain free of falls  Description  INTERVENTIONS:  - Assess patient frequently for physical needs  -  Identify cognitive and physical deficits and behaviors that affect risk of falls  -  [place] fall precautions as indicated by assessment   - Educate patient/family on patient safety including physical limitations  - Instruct patient to call for assistance with activity based on assessment  - Modify environment to reduce risk of injury  - Consider OT/PT consult to assist with strengthening/mobility  Outcome: Progressing     Problem: Prexisting or High Potential for Compromised Skin Integrity  Goal: Skin integrity is maintained or improved  Description  INTERVENTIONS:  - Identify patients at risk for skin breakdown  - Assess and monitor skin integrity  - Assess and monitor nutrition and hydration status  - Monitor labs   - Assess for incontinence   - Turn and reposition patient  - Assist with mobility/ambulation  - Relieve pressure over bony prominences  - Avoid friction and shearing  - Provide appropriate hygiene as needed including keeping skin clean and dry  - Evaluate need for skin moisturizer/barrier cream  - Collaborate with interdisciplinary team   - Patient/family teaching  - Consider wound care consult   Outcome: Progressing     Problem: Nutrition/Hydration-ADULT  Goal: Nutrient/Hydration intake appropriate for improving, restoring or maintaining nutritional needs  Description  Monitor and assess patient's nutrition/hydration status for malnutrition  Collaborate with interdisciplinary team and initiate plan and interventions as ordered  Monitor patient's weight and dietary intake as ordered or per policy  Utilize nutrition screening tool and intervene as necessary  Determine patient's food preferences and provide high-protein, high-caloric foods as appropriate       INTERVENTIONS:  - Monitor oral intake, urinary output, labs, and treatment plans  - Assess nutrition and hydration status and recommend course of action  - Evaluate amount of meals eaten  - Assist patient with eating if necessary   - Allow adequate time for meals  - Recommend/ encourage appropriate diets, oral nutritional supplements, and vitamin/mineral supplements  - Order, calculate, and assess calorie counts as needed  - Recommend, monitor, and adjust tube feedings and TPN/PPN based on assessed needs  - Assess need for intravenous fluids  - Provide specific nutrition/hydration education as appropriate  - Include patient/family/caregiver in decisions related to nutrition  Outcome: Progressing     Problem: PAIN - ADULT  Goal: Verbalizes/displays adequate comfort level or baseline comfort level  Description  Interventions:  - Encourage patient to monitor pain and request assistance  - Assess pain using appropriate pain scale  - Administer analgesics based on type and severity of pain and evaluate response  - Implement non-pharmacological measures as appropriate and evaluate response  - Consider cultural and social influences on pain and pain management  - Notify physician/advanced practitioner if interventions unsuccessful or patient reports new pain  Outcome: Progressing     Problem: INFECTION - ADULT  Goal: Absence or prevention of progression during hospitalization  Description  INTERVENTIONS:  - Assess and monitor for signs and symptoms of infection  - Monitor lab/diagnostic results  - Monitor all insertion sites, i e  indwelling lines, tubes, and drains  - [place] appropriate cooling/warming therapies per order  - Administer medications as ordered  - Instruct and encourage patient and family to use good hand hygiene technique  - Identify and instruct in appropriate isolation precautions for identified infection/condition   Outcome: Progressing     Problem: SAFETY ADULT  Goal: Maintain or return to baseline ADL function  Description  INTERVENTIONS:  -  Assess patient's ability to carry out ADLs; assess patient's baseline for ADL function and identify physical deficits which impact ability to perform ADLs (bathing, care of mouth/teeth, toileting, grooming, dressing, etc )  - Assess/evaluate cause of self-care deficits   - Assess range of motion  - Assess patient's mobility; develop plan if impaired  - Assess patient's need for assistive devices and provide as appropriate  - Encourage maximum independence but intervene and supervise when necessary  - Involve family in performance of ADLs  - Assess for home care needs following discharge   - Consider OT consult to assist with ADL evaluation and planning for discharge  - Provide patient education as appropriate  Outcome: Progressing  Goal: Maintain or return mobility status to optimal level  Description  INTERVENTIONS:  - Assess patient's baseline mobility status (ambulation, transfers, stairs, etc )    - Identify cognitive and physical deficits and behaviors that affect mobility  - Identify mobility aids required to assist with transfers and/or ambulation (gait belt, sit-to-stand, lift, walker, cane, etc )  - [place] fall precautions as indicated by assessment  - Record patient progress and toleration of activity level on Mobility SBAR; progress patient to next Phase/Stage  - Instruct patient to call for assistance with activity based on assessment  - Consider rehabilitation consult to assist with strengthening/weightbearing, etc   Outcome: Progressing     Problem: DISCHARGE PLANNING  Goal: Discharge to home or other facility with appropriate resources  Description  INTERVENTIONS:  - Identify barriers to discharge w/patient and caregiver  - Arrange for needed discharge resources and transportation as appropriate  - Identify discharge learning needs (meds, wound care, etc )  - Arrange for interpretive services to assist at discharge as needed  - Refer to Case Management Department for coordinating discharge planning if the patient needs post-hospital services based on physician/advanced practitioner order or complex needs related to functional status, cognitive ability, or social support system  Outcome: Progressing     Problem: Knowledge Deficit  Goal: Patient/family/caregiver demonstrates understanding of disease process, treatment plan, medications, and discharge instructions  Description  Complete learning assessment and assess knowledge base    Interventions:  - Provide teaching at level of understanding  - Provide teaching via preferred learning methods  Outcome: Progressing     Problem: METABOLIC, FLUID AND ELECTROLYTES - ADULT  Goal: Electrolytes maintained within normal limits  Description  INTERVENTIONS:  - Monitor labs and assess patient for signs and symptoms of electrolyte imbalances  - Administer electrolyte replacement as ordered  - Monitor response to electrolyte replacements, including repeat lab results as appropriate  - Instruct patient on fluid and nutrition as appropriate  Outcome: Progressing  Goal: Fluid balance maintained  Description  INTERVENTIONS:  - Monitor labs   - Monitor I/O and WT  - Instruct patient on fluid and nutrition as appropriate  - Assess for signs & symptoms of volume excess or deficit  Outcome: Progressing  Goal: Glucose maintained within target range  Description  INTERVENTIONS:  - Monitor Blood Glucose as ordered  - Assess for signs and symptoms of hyperglycemia and hypoglycemia  - Administer ordered medications to maintain glucose within target range  - Assess nutritional intake and initiate nutrition service referral as needed  Outcome: Progressing     Problem: RESPIRATORY - ADULT  Goal: Achieves optimal ventilation and oxygenation  Description  INTERVENTIONS:  - Assess for changes in respiratory status  - Assess for changes in mentation and behavior  - Position to facilitate oxygenation and minimize respiratory effort  - Oxygen administered by appropriate delivery if ordered  - Initiate smoking cessation education as indicated  - Encourage broncho-pulmonary hygiene including cough, deep breathe, Incentive Spirometry  - Assess the need for suctioning and aspirate as needed  - Assess and instruct to report SOB or any respiratory difficulty  - Respiratory Therapy support as indicated  Outcome: Progressing

## 2019-11-24 NOTE — ASSESSMENT & PLAN NOTE
Likely musculoskeletal in nature  Apply heat to affected area     Lumbar x ray Stable chronic multilevel disc degeneration as well as lower lumbar facet degeneration (slightly advanced from prior), Atherosclerotic disease noted  CXR: no acute cardiopulmonary disease

## 2019-11-24 NOTE — PROGRESS NOTES
[address] [facility] [address] [facility] Practice Progress Note - Ingris Fernando 64 y o  male MRN: [id number]    Unit/Bed#: [address] [place] [address] Encounter: [id number]      Assessment/Plan:  * Acute alcoholic intoxication without complication ([facility] [address] )  Assessment & Plan  ETOH level 402 on admission  Pt has prescription for librium, understands what it is for, but has not been using it  B12, folate, 1 L NS given in ED  -initiate CIWA protocol  -follow-up CMP  -fall precaution  -consult to case management for possible inpatient rehab   -Although electrolytes ok will place on Tele given chance of imbalance 2/2 alcohol abuse  -folate/thiamine given 11/23  Replete electrolytes prn    GERD (gastroesophageal reflux disease)  Assessment & Plan  Stable  Can do pepcid for now  Holding nexium may not need PPI and would not be good for him given macrocytosis as PPI's can lead to b12 deficiency    Hypernatremia  Assessment & Plan  Possibly due to dehydration  Pt already received 1 L NS  Repeat CMP in am  Oral hydration prn    Back pain  Assessment & Plan  Likely musculoskeletal in nature  Apply heat to affected area  Lumbar x ray Stable chronic multilevel disc degeneration as well as lower lumbar facet degeneration (slightly advanced from prior), Atherosclerotic disease noted  CXR: no acute cardiopulmonary disease    Nicotine abuse  Assessment & Plan  Nicotine patch prn    CAD (coronary artery disease)  Assessment & Plan  Status post stent placement as well as cabg in 2014  Unsure of compliance with meds as patient reports he has not taken them since Wednesday the 20th of nov  - Continue home medication , Plavix 75 mg p o  Q d  -Pt reports his heart Doctor (Vivek) told him to take two metoprolol succinate 50 mg p o  Q d but on prescription it only says 50 mg PO qd so will order as prescribed at this time  -ranolazine 500 mg p o  B i d  History of prostate cancer  Assessment & Plan  Status post resection and radiation on remission   Continue flomax    HLD (hyperlipidemia)  Assessment & Plan  Stable-formulary equivalent of pt home statin    Hx of CABG  Assessment & Plan  History of CABG in 2004   Stent in 2014  [facility] with Zoloft 100 mg tablet p o  Q d  Hypomagnesemia  Assessment & Plan  Currently low normal, likely secondary to alcohol abuse  Continue home mag ox 400 mg PO qd    Essential (primary) hypertension  Assessment & Plan  Continue with home medication Norvasc 5 mg tablet p o  Q d  Formulary equivalent of Monopril 10 mg p o  Q d  Pt reports his cards Dr Michael Velez to take Metoprolol succinate 50 mg two times but prescription only says 50 mg PO qd so will order as prescribed at this time  Monitor BP    Type 2 diabetes mellitus [facility])  Assessment & Plan  Lab Results   Component Value Date    HGBA1C 5 4 11/23/2019       Recent Labs     11/22/19  2336   POCGLU 108       Blood Sugar Average: Last 72 hrs:  (P) 108     Continue Metformin    COPD (chronic obstructive pulmonary disease) (HCC)  Assessment & Plan  DuoNeb p r n  Q 6h prn  Continue with Breo Ellipta in place of home Spiriva  O2 p r n  Subjective:   Patient seen examined at bedside  Was a bit lethargic and laying in supine position with bed flat  He was treated with 5 doses of Ativan yesterday  Patient will be given 40 mEq potassium chloride this morning with potassium 3 3  Patient's chest pain, says he has some shortness of breath is currently on 4 5 L oxygen nasal cannula, denies abdominal pain, problems with bowel or urination  Objective:     Vitals: Blood pressure 156/86, pulse 75, temperature 97 8 °F (36 6 °C), temperature source Temporal, resp  rate 18, height 6' 2" (1 88 m), weight 88 7 kg (195 lb 8 8 oz), SpO2 94 %  ,Body mass index is 25 11 kg/m²    Wt Readings from Last 3 Encounters:   11/22/19 88 7 kg (195 lb 8 8 oz)   10/28/19 90 kg (198 lb 6 4 oz)   10/17/19 92 4 kg (203 lb 11 3 oz) Intake/Output Summary (Last 24 hours) at 11/24/2019 0900  Last data filed at 11/24/2019 9956  Gross per 24 hour   Intake 1595 ml   Output 2125 ml   Net -530 ml       Physical Exam:   General: Lethargic and slow in speech as well as motion  Cardiac: RRR, S1 +S2, no murmur appreciated  Pulmonary: Upper and lower lobe wheezes with expiration bilaterally  Abdomen: + bowel sounds, soft, Tenderness to palpation over Left lower abdomen to palpation  Extremities: Distal pulses intact  Neuro: Sensation intact but decreases over lower extremities bilaterally    Skin: Warm and Dry    Recent Results (from the past 24 hour(s))   Basic metabolic panel    Collection Time: 11/24/19  4:38 AM   Result Value Ref Range    Sodium 139 136 - 145 mmol/L    Potassium 3 3 (L) 3 5 - 5 3 mmol/L    Chloride 101 100 - 108 mmol/L    CO2 31 21 - 32 mmol/L    ANION GAP 7 4 - 13 mmol/L    BUN 24 5 - 25 mg/dL    Creatinine 0 94 0 60 - 1 30 mg/dL    Glucose 93 65 - 140 mg/dL    Calcium 8 8 8 3 - 10 1 mg/dL    eGFR 90 ml/min/1 73sq m   CBC    Collection Time: 11/24/19  4:38 AM   Result Value Ref Range    WBC 8 89 4 31 - 10 16 Thousand/uL    RBC 4 08 3 88 - 5 62 Million/uL    Hemoglobin 13 4 12 0 - 17 0 g/dL    Hematocrit 40 8 36 5 - 49 3 %     (H) 82 - 98 fL    MCH 32 8 26 8 - 34 3 pg    MCHC 32 8 31 4 - 37 4 g/dL    RDW 13 3 11 6 - 15 1 %    Platelets 768 (L) 803 - 390 Thousands/uL    MPV 11 3 8 9 - 12 7 fL   Magnesium    Collection Time: 11/24/19  4:38 AM   Result Value Ref Range    Magnesium 1 6 1 6 - 2 6 mg/dL   Phosphorus    Collection Time: 11/24/19  4:38 AM   Result Value Ref Range    Phosphorus 2 8 2 7 - 4 5 mg/dL       Current Facility-Administered Medications   Medication Dose Route Frequency Provider Last Rate Last Dose    acetaminophen (TYLENOL) tablet 650 mg  650 mg Oral Q6H PRN Sofiya Muhammad MD        albuterol (PROVENTIL HFA,VENTOLIN HFA) inhaler 2 puff  2 puff Inhalation Q4H PRN Rosetta Kenyon MD   2 puff at 11/23/19 0932  amLODIPine (NORVASC) tablet 5 mg  5 mg Oral Daily Darwin Carter MD   5 mg at 11/24/19 3330    chlordiazePOXIDE (LIBRIUM) capsule 50 mg  50 mg Oral Q6H [facility] Li An DO   50 mg at 11/24/19 0550    clopidogrel (PLAVIX) tablet 75 mg  75 mg Oral Daily Darwin Carter MD   75 mg at 11/24/19 0811    enoxaparin (LOVENOX) subcutaneous injection 40 mg  40 mg Subcutaneous Daily Darwin Carter MD   40 mg at 11/24/19 0811    famotidine (PEPCID) tablet 20 mg  20 mg Oral BID Darwin Carter MD   20 mg at 11/24/19 9352    fluticasone-vilanterol (BREO ELLIPTA) 100-25 mcg/inh inhaler 1 puff  1 puff Inhalation Daily Darwin Carter MD   1 puff at 66/12/46 2253    folic acid (FOLVITE) tablet 1 mg  1 mg Oral Daily Darwin Carter MD   1 mg at 11/24/19 0811    ipratropium-albuterol (DUO-NEB) 0 5-2 5 mg/3 mL inhalation solution 3 mL  3 mL Nebulization Q6H PRN Darwin Carter MD        lisinopril (ZESTRIL) tablet 10 mg  10 mg Oral Daily Darwin Carter MD   10 mg at 11/24/19 0811    LORazepam (ATIVAN) 2 mg/mL injection 2 mg  2 mg Intravenous Q6H PRN Valarie Dumont DO        magnesium sulfate 2 g/50 mL IVPB (premix) 2 g  2 g Intravenous Once Jeremiah Andino MD   2 g at 11/24/19 0857    metFORMIN (GLUCOPHAGE) tablet 1,000 mg  1,000 mg Oral BID With Meals Darwin Carter MD   1,000 mg at 11/24/19 0811    metoprolol succinate (TOPROL-XL) 24 hr tablet 50 mg  50 mg Oral Daily Darwin Carter MD   50 mg at 11/24/19 0812    nicotine (NICODERM CQ) 21 mg/24 hr TD 24 hr patch 21 mg  21 mg Transdermal Daily Li An DO   21 mg at 11/24/19 0807    ondansetron (ZOFRAN) injection 4 mg  4 mg Intravenous Q6H PRN Coleman Reynoso, MD        pravastatin (PRAVACHOL) tablet 40 mg  40 mg Oral Daily With Hiro Mohr MD   40 mg at 11/23/19 1720    ranolazine (RANEXA) 12 hr tablet 500 mg  500 mg Oral BID Darwin Carter MD   500 mg at 11/24/19 1885    sertraline (ZOLOFT) tablet 100 mg  100 mg Oral Daily Darwin Carter MD   100 mg at 11/24/19 5570  tamsulosin (FLOMAX) capsule 0 4 mg  0 4 mg Oral Daily Mihai Arreola MD   0 4 mg at 11/24/19 8682    thiamine (VITAMIN B1) tablet 100 mg  100 mg Oral Daily Mihai Arreola MD   100 mg at 11/24/19 0811    tiotropium (SPIRIVA) capsule for inhaler 18 mcg  18 mcg Inhalation Daily Mihai Arreola MD   18 mcg at 11/24/19 0810       Invasive Devices     Peripheral Intravenous Line            Peripheral IV 11/22/19 Left Antecubital 1 day                Lab, Imaging and other studies: I have personally reviewed pertinent reports      VTE Pharmacologic Prophylaxis: Enoxaparin (Lovenox)  VTE Mechanical Prophylaxis: sequential compression device    Bradford Lucia MD

## 2019-11-24 NOTE — ASSESSMENT & PLAN NOTE
Status post stent placement as well as cabg in 2014   Unsure of compliance with meds as patient reports he has not taken them since Wednesday the 20th of nov  - Continue home medication , Plavix 75 mg p o  Q d  -Pt reports his heart Doctor (Vivek) told him to take two metoprolol succinate 50 mg p o  Q d but on prescription it only says 50 mg PO qd so will order as prescribed at this time  -ranolazine 500 mg p o  B i d

## 2019-11-24 NOTE — ASSESSMENT & PLAN NOTE
History of CABG in 2004   Stent in 2014   Patient's cardiologist from [facility]

## 2019-11-24 NOTE — ASSESSMENT & PLAN NOTE
DuoNeb p r n  Q 6h prn  Continue with Breo Ellipta in place of home Spiriva  O2 p r n  Weaning down on nasal cannula  Patient is much improved with clear upper and lower lung sounds with no wheezing

## 2019-11-24 NOTE — ASSESSMENT & PLAN NOTE
ETOH level 402 on admission  Pt has prescription for librium, understands what it is for, but has not been using it  B12, folate, 1 L NS given in ED  -initiate CIWA protocol  -follow-up CMP  -fall precaution  -consult to case management for possible inpatient rehab   -Although electrolytes ok will place on Tele given chance of imbalance 2/2 alcohol abuse  -folate/thiamine given 11/23  Replete electrolytes prn  -patient would like to go home from hospital and says he will get detox on his own for about 2 weeks  Refuses rehab

## 2019-11-24 NOTE — ASSESSMENT & PLAN NOTE
Continue with home medication Norvasc 5 mg tablet p o  Q d  Formulary equivalent of Monopril 10 mg p o  Q d    Pt reports his cards Dr Dona Brasher to take Metoprolol succinate 50 mg two times but prescription only says 50 mg PO qd so will order as prescribed at this time  Monitor BP

## 2019-11-24 NOTE — ASSESSMENT & PLAN NOTE
Lab Results   Component Value Date    HGBA1C 5 4 11/23/2019       Recent Labs     11/22/19  2336   POCGLU 108       Blood Sugar Average: Last 72 hrs:  (P) 108     Continue Metformin

## 2019-11-25 VITALS
SYSTOLIC BLOOD PRESSURE: 161 MMHG | RESPIRATION RATE: 20 BRPM | BODY MASS INDEX: 25.1 KG/M2 | HEART RATE: 89 BPM | WEIGHT: 195.55 LBS | TEMPERATURE: 97.9 F | HEIGHT: 74 IN | OXYGEN SATURATION: 96 % | DIASTOLIC BLOOD PRESSURE: 86 MMHG

## 2019-11-25 LAB
ALBUMIN SERPL BCP-MCNC: 3.3 G/DL (ref 3.5–5)
ALP SERPL-CCNC: 75 U/L (ref 46–116)
ALT SERPL W P-5'-P-CCNC: 31 U/L (ref 12–78)
ANION GAP SERPL CALCULATED.3IONS-SCNC: 10 MMOL/L (ref 4–13)
AST SERPL W P-5'-P-CCNC: 26 U/L (ref 5–45)
ATRIAL RATE: 87 BPM
BILIRUB SERPL-MCNC: 0.6 MG/DL (ref 0.2–1)
BUN SERPL-MCNC: 25 MG/DL (ref 5–25)
CALCIUM SERPL-MCNC: 8.9 MG/DL (ref 8.3–10.1)
CHLORIDE SERPL-SCNC: 100 MMOL/L (ref 100–108)
CO2 SERPL-SCNC: 27 MMOL/L (ref 21–32)
CREAT SERPL-MCNC: 0.89 MG/DL (ref 0.6–1.3)
ERYTHROCYTE [DISTWIDTH] IN BLOOD BY AUTOMATED COUNT: 13.1 % (ref 11.6–15.1)
GFR SERPL CREATININE-BSD FRML MDRD: 95 ML/MIN/1.73SQ M
GLUCOSE SERPL-MCNC: 89 MG/DL (ref 65–140)
HCT VFR BLD AUTO: 41.7 % (ref 36.5–49.3)
HGB BLD-MCNC: 13.8 G/DL (ref 12–17)
MAGNESIUM SERPL-MCNC: 1.6 MG/DL (ref 1.6–2.6)
MCH RBC QN AUTO: 33.2 PG (ref 26.8–34.3)
MCHC RBC AUTO-ENTMCNC: 33.1 G/DL (ref 31.4–37.4)
MCV RBC AUTO: 100 FL (ref 82–98)
P AXIS: 68 DEGREES
PHOSPHATE SERPL-MCNC: 3.5 MG/DL (ref 2.7–4.5)
PLATELET # BLD AUTO: 111 THOUSANDS/UL (ref 149–390)
PMV BLD AUTO: 11.4 FL (ref 8.9–12.7)
POTASSIUM SERPL-SCNC: 3.6 MMOL/L (ref 3.5–5.3)
PR INTERVAL: 134 MS
PROT SERPL-MCNC: 7.3 G/DL (ref 6.4–8.2)
QRS AXIS: 78 DEGREES
QRSD INTERVAL: 94 MS
QT INTERVAL: 366 MS
QTC INTERVAL: 440 MS
RBC # BLD AUTO: 4.16 MILLION/UL (ref 3.88–5.62)
SODIUM SERPL-SCNC: 137 MMOL/L (ref 136–145)
T WAVE AXIS: 89 DEGREES
VENTRICULAR RATE: 87 BPM
WBC # BLD AUTO: 7.32 THOUSAND/UL (ref 4.31–10.16)

## 2019-11-25 PROCEDURE — 93010 ELECTROCARDIOGRAM REPORT: CPT | Performed by: INTERNAL MEDICINE

## 2019-11-25 PROCEDURE — NC001 PR NO CHARGE: Performed by: FAMILY MEDICINE

## 2019-11-25 PROCEDURE — 85027 COMPLETE CBC AUTOMATED: CPT | Performed by: STUDENT IN AN ORGANIZED HEALTH CARE EDUCATION/TRAINING PROGRAM

## 2019-11-25 PROCEDURE — 94640 AIRWAY INHALATION TREATMENT: CPT

## 2019-11-25 PROCEDURE — 83735 ASSAY OF MAGNESIUM: CPT | Performed by: STUDENT IN AN ORGANIZED HEALTH CARE EDUCATION/TRAINING PROGRAM

## 2019-11-25 PROCEDURE — 80053 COMPREHEN METABOLIC PANEL: CPT | Performed by: STUDENT IN AN ORGANIZED HEALTH CARE EDUCATION/TRAINING PROGRAM

## 2019-11-25 PROCEDURE — 84100 ASSAY OF PHOSPHORUS: CPT | Performed by: STUDENT IN AN ORGANIZED HEALTH CARE EDUCATION/TRAINING PROGRAM

## 2019-11-25 PROCEDURE — 94762 N-INVAS EAR/PLS OXIMTRY CONT: CPT

## 2019-11-25 PROCEDURE — 99238 HOSP IP/OBS DSCHRG MGMT 30/<: CPT | Performed by: FAMILY MEDICINE

## 2019-11-25 RX ORDER — MAGNESIUM SULFATE HEPTAHYDRATE 40 MG/ML
2 INJECTION, SOLUTION INTRAVENOUS ONCE
Status: COMPLETED | OUTPATIENT
Start: 2019-11-25 | End: 2019-11-25

## 2019-11-25 RX ORDER — IPRATROPIUM BROMIDE AND ALBUTEROL SULFATE 2.5; .5 MG/3ML; MG/3ML
3 SOLUTION RESPIRATORY (INHALATION) 4 TIMES DAILY PRN
Status: DISCONTINUED | OUTPATIENT
Start: 2019-11-25 | End: 2019-11-25 | Stop reason: HOSPADM

## 2019-11-25 RX ORDER — POTASSIUM CHLORIDE 20 MEQ/1
40 TABLET, EXTENDED RELEASE ORAL ONCE
Status: COMPLETED | OUTPATIENT
Start: 2019-11-25 | End: 2019-11-25

## 2019-11-25 RX ORDER — METOPROLOL SUCCINATE 100 MG/1
50 TABLET, EXTENDED RELEASE ORAL DAILY
Qty: 30 TABLET | Refills: 3 | Status: SHIPPED | OUTPATIENT
Start: 2019-11-25 | End: 2020-08-10

## 2019-11-25 RX ADMIN — Medication 100 MG: at 09:19

## 2019-11-25 RX ADMIN — IPRATROPIUM BROMIDE AND ALBUTEROL SULFATE 3 ML: 2.5; .5 SOLUTION RESPIRATORY (INHALATION) at 07:43

## 2019-11-25 RX ADMIN — CHLORDIAZEPOXIDE HYDROCHLORIDE 50 MG: 25 CAPSULE ORAL at 05:17

## 2019-11-25 RX ADMIN — SERTRALINE HYDROCHLORIDE 100 MG: 100 TABLET ORAL at 09:18

## 2019-11-25 RX ADMIN — TAMSULOSIN HYDROCHLORIDE 0.4 MG: 0.4 CAPSULE ORAL at 09:19

## 2019-11-25 RX ADMIN — METFORMIN HYDROCHLORIDE 1000 MG: 500 TABLET ORAL at 09:19

## 2019-11-25 RX ADMIN — FOLIC ACID 1 MG: 1 TABLET ORAL at 09:19

## 2019-11-25 RX ADMIN — LISINOPRIL 10 MG: 10 TABLET ORAL at 09:19

## 2019-11-25 RX ADMIN — AMLODIPINE BESYLATE 5 MG: 5 TABLET ORAL at 09:19

## 2019-11-25 RX ADMIN — NICOTINE 21 MG: 21 PATCH, EXTENDED RELEASE TRANSDERMAL at 09:19

## 2019-11-25 RX ADMIN — POTASSIUM CHLORIDE 40 MEQ: 1500 TABLET, EXTENDED RELEASE ORAL at 09:18

## 2019-11-25 RX ADMIN — FLUTICASONE FUROATE AND VILANTEROL TRIFENATATE 1 PUFF: 100; 25 POWDER RESPIRATORY (INHALATION) at 09:18

## 2019-11-25 RX ADMIN — ENOXAPARIN SODIUM 40 MG: 40 INJECTION SUBCUTANEOUS at 09:18

## 2019-11-25 RX ADMIN — CLOPIDOGREL BISULFATE 75 MG: 75 TABLET ORAL at 09:18

## 2019-11-25 RX ADMIN — FAMOTIDINE 20 MG: 20 TABLET ORAL at 09:19

## 2019-11-25 RX ADMIN — METOPROLOL SUCCINATE 50 MG: 50 TABLET, EXTENDED RELEASE ORAL at 09:18

## 2019-11-25 RX ADMIN — MAGNESIUM SULFATE HEPTAHYDRATE 2 G: 40 INJECTION, SOLUTION INTRAVENOUS at 09:18

## 2019-11-25 RX ADMIN — RANOLAZINE 500 MG: 500 TABLET, FILM COATED, EXTENDED RELEASE ORAL at 09:19

## 2019-11-25 RX ADMIN — CHLORDIAZEPOXIDE HYDROCHLORIDE 50 MG: 25 CAPSULE ORAL at 12:39

## 2019-11-25 NOTE — PROGRESS NOTES
[address] [facility] 67 [address] 84 [facility] Progress Note - Robbi Ny 64 y o  male MRN: [id number]    Unit/Bed#: [address] [place] [address] Encounter: [id number]      Assessment/Plan:  * Acute alcoholic intoxication without complication ([address] )  Assessment & Plan  ETOH level 402 on admission  Pt has prescription for librium, understands what it is for, but has not been using it  B12, folate, 1 L NS given in ED  -initiate CIWA protocol  -follow-up CMP  -fall precaution  -consult to case management for possible inpatient rehab   -Although electrolytes ok will place on Tele given chance of imbalance 2/2 alcohol abuse  -folate/thiamine given 11/23  Replete electrolytes prn  -patient would like to go home from hospital and says he will get detox on his own for about 2 weeks  Refuses rehab  GERD (gastroesophageal reflux disease)  Assessment & Plan  Stable  Can do pepcid for now  Holding nexium may not need PPI and would not be good for him given macrocytosis as PPI's can lead to b12 deficiency    Hypernatremia  Assessment & Plan  Possibly due to dehydration  Pt already received 1 L NS  Repeat CMP in am  Oral hydration prn    Back pain  Assessment & Plan  Likely musculoskeletal in nature  Apply heat to affected area  Lumbar x ray Stable chronic multilevel disc degeneration as well as lower lumbar facet degeneration (slightly advanced from prior), Atherosclerotic disease noted  CXR: no acute cardiopulmonary disease    Nicotine abuse  Assessment & Plan  Nicotine patch prn    CAD (coronary artery disease)  Assessment & Plan  Status post stent placement as well as cabg in 2014  Unsure of compliance with meds as patient reports he has not taken them since Wednesday the 20th of nov  - Continue home medication , Plavix 75 mg p o  Q d  -Pt reports his heart Doctor (Vivek) told him to take two metoprolol succinate 50 mg p o  Q d but on prescription it only says 50 mg PO qd so will order as prescribed at this time  -ranolazine 500 mg p o  B i d  History of prostate cancer  Assessment & Plan  Status post resection and radiation on remission  Continue flomax    HLD (hyperlipidemia)  Assessment & Plan  Stable-formulary equivalent of pt home statin    Hx of CABG  Assessment & Plan  History of CABG in 2004   Stent in 2014  [facility] 100 mg tablet p o  Q d  Hypomagnesemia  Assessment & Plan  Currently low normal, likely secondary to alcohol abuse  Continue home mag ox 400 mg PO qd    Essential (primary) hypertension  Assessment & Plan  Continue with home medication Norvasc 5 mg tablet p o  Q d  Formulary equivalent of Monopril 10 mg p o  Q d  Pt reports his cards Dr Michael Velez to take Metoprolol succinate 50 mg two times but prescription only says 50 mg PO qd so will order as prescribed at this time  Monitor BP    Type 2 diabetes mellitus [facility])  Assessment & Plan  Lab Results   Component Value Date    HGBA1C 5 4 11/23/2019       Recent Labs     11/22/19  2336   POCGLU 108       Blood Sugar Average: Last 72 hrs:  (P) 108     Continue Metformin    COPD (chronic obstructive pulmonary disease) (HCC)  Assessment & Plan  DuoNeb p r n  Q 6h prn  Continue with Breo Ellipta in place of home Spiriva  O2 p r n  Weaning down on nasal cannula  Patient is much improved with clear upper and lower lung sounds with no wheezing  Subjective:   Patient was seen examined at bedside with head of bed up 30°  He was in no acute distress reports he is breathing well, has no chest pain, no abdominal pain, no problems with bowel or urination  Patient states he wants to go home when discharged and will go on his own for detox  Objective:     Vitals: Blood pressure 170/81, pulse 80, temperature 97 9 °F (36 6 °C), temperature source Temporal, resp  rate 19, height 6' 2" (1 88 m), weight 88 7 kg (195 lb 8 8 oz), SpO2 93 %  ,Body mass index is 25 11 kg/m²    Wt Readings from Last 3 Encounters:   11/22/19 88 7 kg (195 lb 8 8 oz)   10/28/19 90 kg (198 lb 6 4 oz)   10/17/19 92 4 kg (203 lb 11 3 oz)       Intake/Output Summary (Last 24 hours) at 11/25/2019 0752  Last data filed at 11/25/2019 0101  Gross per 24 hour   Intake    Output 1900 ml   Net -1900 ml       Physical Exam:   General:  Alert and oriented x3, patient has general mild shakes in hands bilaterally  Cardiac: RRR, S1 +S2, no murmur appreciated  Pulmonary: CTAB  Extremities: Distal pulses intact  Skin: Warm and Dry  The patient had a rush of bathroom during exam and left a stream or urine across floor on his way there     Recent Results (from the past 24 hour(s))   CBC and Platelet    Collection Time: 11/25/19  5:17 AM   Result Value Ref Range    WBC 7 32 4 31 - 10 16 Thousand/uL    RBC 4 16 3 88 - 5 62 Million/uL    Hemoglobin 13 8 12 0 - 17 0 g/dL    Hematocrit 41 7 36 5 - 49 3 %     (H) 82 - 98 fL    MCH 33 2 26 8 - 34 3 pg    MCHC 33 1 31 4 - 37 4 g/dL    RDW 13 1 11 6 - 15 1 %    Platelets 666 (L) 076 - 390 Thousands/uL    MPV 11 4 8 9 - 12 7 fL   Comprehensive metabolic panel    Collection Time: 11/25/19  5:17 AM   Result Value Ref Range    Sodium 137 136 - 145 mmol/L    Potassium 3 6 3 5 - 5 3 mmol/L    Chloride 100 100 - 108 mmol/L    CO2 27 21 - 32 mmol/L    ANION GAP 10 4 - 13 mmol/L    BUN 25 5 - 25 mg/dL    Creatinine 0 89 0 60 - 1 30 mg/dL    Glucose 89 65 - 140 mg/dL    Calcium 8 9 8 3 - 10 1 mg/dL    AST 26 5 - 45 U/L    ALT 31 12 - 78 U/L    Alkaline Phosphatase 75 46 - 116 U/L    Total Protein 7 3 6 4 - 8 2 g/dL    Albumin 3 3 (L) 3 5 - 5 0 g/dL    Total Bilirubin 0 60 0 20 - 1 00 mg/dL    eGFR 95 ml/min/1 73sq m   Magnesium    Collection Time: 11/25/19  5:17 AM   Result Value Ref Range    Magnesium 1 6 1 6 - 2 6 mg/dL   Phosphorus    Collection Time: 11/25/19  5:17 AM   Result Value Ref Range    Phosphorus 3 5 2 7 - 4 5 mg/dL       Current Facility-Administered Medications   Medication Dose Route Frequency Provider Last Rate Last Dose    acetaminophen (TYLENOL) tablet 650 mg  650 mg Oral Q6H PRN Nathaly Love MD        albuterol (PROVENTIL HFA,VENTOLIN HFA) inhaler 2 puff  2 puff Inhalation Q4H PRN Edelmira Parks MD   2 puff at 11/23/19 0932    amLODIPine (NORVASC) tablet 5 mg  5 mg Oral Daily Edelmira Parks MD   5 mg at 11/24/19 7462    chlordiazePOXIDE (LIBRIUM) capsule 50 mg  50 mg Oral Q6H [address] Amulia Nataliya, DO   50 mg at 11/25/19 0517    clopidogrel (PLAVIX) tablet 75 mg  75 mg Oral Daily Edelmira Parks MD   75 mg at 11/24/19 0811    enoxaparin (LOVENOX) subcutaneous injection 40 mg  40 mg Subcutaneous Daily Edelmira Parks MD   40 mg at 11/24/19 0811    famotidine (PEPCID) tablet 20 mg  20 mg Oral BID Edelmira Parks MD   20 mg at 11/24/19 1747    fluticasone-vilanterol (BREO ELLIPTA) 100-25 mcg/inh inhaler 1 puff  1 puff Inhalation Daily Edelmira Parks MD   1 puff at 38/79/20 9629    folic acid (FOLVITE) tablet 1 mg  1 mg Oral Daily Edelmira Parks MD   1 mg at 11/24/19 0811    ipratropium-albuterol (DUO-NEB) 0 5-2 5 mg/3 mL inhalation solution 3 mL  3 mL Nebulization Q6H PRN Edelmira Parks MD        ipratropium-albuterol (DUO-NEB) 0 5-2 5 mg/3 mL inhalation solution 3 mL  3 mL Nebulization 4x Daily Georgine Single, DO   3 mL at 11/25/19 0743    lisinopril (ZESTRIL) tablet 10 mg  10 mg Oral Daily Edelmira Parks MD   10 mg at 11/24/19 5261    metFORMIN (GLUCOPHAGE) tablet 1,000 mg  1,000 mg Oral BID With Meals Edelmira Parks MD   1,000 mg at 11/24/19 1747    metoprolol succinate (TOPROL-XL) 24 hr tablet 50 mg  50 mg Oral Daily Edelmira Parks MD   50 mg at 11/24/19 0812    nicotine (NICODERM CQ) 21 mg/24 hr TD 24 hr patch 21 mg  21 mg Transdermal Daily Li An DO   21 mg at 11/24/19 0807    ondansetron (ZOFRAN) injection 4 mg  4 mg Intravenous Q6H PRN Nathaly Love MD        pravastatin (PRAVACHOL) tablet 40 mg  40 mg Oral Daily With Avery Pelaez MD   40 mg at 11/24/19 9661    ranolazine (RANEXA) 12 hr tablet 500 mg  500 mg Oral BID Rosetta Kenyon MD   500 mg at 11/24/19 1747    sertraline (ZOLOFT) tablet 100 mg  100 mg Oral Daily Rosetta Kenyon MD   100 mg at 11/24/19 4968    tamsulosin (FLOMAX) capsule 0 4 mg  0 4 mg Oral Daily Rosetta Kenyon MD   0 4 mg at 11/24/19 6589    thiamine (VITAMIN B1) tablet 100 mg  100 mg Oral Daily Rosetta Kenyon MD   100 mg at 11/24/19 7678       Invasive Devices     Peripheral Intravenous Line            Peripheral IV 11/22/19 Left Antecubital 2 days                Lab, Imaging and other studies: I have personally reviewed pertinent reports      VTE Pharmacologic Prophylaxis: Enoxaparin (Lovenox)  VTE Mechanical Prophylaxis: sequential compression device    Jorge Luis Malone MD

## 2019-11-25 NOTE — PLAN OF CARE
Problem: Potential for Falls  Goal: Patient will remain free of falls  Description  INTERVENTIONS:  - Assess patient frequently for physical needs  -  Identify cognitive and physical deficits and behaviors that affect risk of falls  -  [place] fall precautions as indicated by assessment   - Educate patient/family on patient safety including physical limitations  - Instruct patient to call for assistance with activity based on assessment  - Modify environment to reduce risk of injury  - Consider OT/PT consult to assist with strengthening/mobility  11/25/2019 1604 by Taylor Santiago RN  Outcome: Completed  11/25/2019 1110 by Taylor Santiago RN  Outcome: Progressing     Problem: Prexisting or High Potential for Compromised Skin Integrity  Goal: Skin integrity is maintained or improved  Description  INTERVENTIONS:  - Identify patients at risk for skin breakdown  - Assess and monitor skin integrity  - Assess and monitor nutrition and hydration status  - Monitor labs   - Assess for incontinence   - Turn and reposition patient  - Assist with mobility/ambulation  - Relieve pressure over bony prominences  - Avoid friction and shearing  - Provide appropriate hygiene as needed including keeping skin clean and dry  - Evaluate need for skin moisturizer/barrier cream  - Collaborate with interdisciplinary team   - Patient/family teaching  - Consider wound care consult   11/25/2019 1604 by Taylor Santiago RN  Outcome: Completed  11/25/2019 1110 by Taylor Santiago RN  Outcome: Progressing     Problem: Nutrition/Hydration-ADULT  Goal: Nutrient/Hydration intake appropriate for improving, restoring or maintaining nutritional needs  Description  Monitor and assess patient's nutrition/hydration status for malnutrition  Collaborate with interdisciplinary team and initiate plan and interventions as ordered  Monitor patient's weight and dietary intake as ordered or per policy   Utilize nutrition screening tool and intervene as necessary  Determine patient's food preferences and provide high-protein, high-caloric foods as appropriate       INTERVENTIONS:  - Monitor oral intake, urinary output, labs, and treatment plans  - Assess nutrition and hydration status and recommend course of action  - Evaluate amount of meals eaten  - Assist patient with eating if necessary   - Allow adequate time for meals  - Recommend/ encourage appropriate diets, oral nutritional supplements, and vitamin/mineral supplements  - Order, calculate, and assess calorie counts as needed  - Recommend, monitor, and adjust tube feedings and TPN/PPN based on assessed needs  - Assess need for intravenous fluids  - Provide specific nutrition/hydration education as appropriate  - Include patient/family/caregiver in decisions related to nutrition  11/25/2019 1604 by Yoly Patino RN  Outcome: Completed  11/25/2019 1110 by Yoly Patino RN  Outcome: Progressing     Problem: PAIN - ADULT  Goal: Verbalizes/displays adequate comfort level or baseline comfort level  Description  Interventions:  - Encourage patient to monitor pain and request assistance  - Assess pain using appropriate pain scale  - Administer analgesics based on type and severity of pain and evaluate response  - Implement non-pharmacological measures as appropriate and evaluate response  - Consider cultural and social influences on pain and pain management  - Notify physician/advanced practitioner if interventions unsuccessful or patient reports new pain  11/25/2019 1604 by Yoly Patino RN  Outcome: Completed  11/25/2019 1110 by Yoly Patino RN  Outcome: Progressing     Problem: INFECTION - ADULT  Goal: Absence or prevention of progression during hospitalization  Description  INTERVENTIONS:  - Assess and monitor for signs and symptoms of infection  - Monitor lab/diagnostic results  - Monitor all insertion sites, i e  indwelling lines, tubes, and drains  - Jadwin appropriate cooling/warming therapies per order  - Administer medications as ordered  - Instruct and encourage patient and family to use good hand hygiene technique  - Identify and instruct in appropriate isolation precautions for identified infection/condition   11/25/2019 1604 by Eliezer Real RN  Outcome: Completed  11/25/2019 1110 by Eliezer Real RN  Outcome: Progressing     Problem: SAFETY ADULT  Goal: Maintain or return to baseline ADL function  Description  INTERVENTIONS:  -  Assess patient's ability to carry out ADLs; assess patient's baseline for ADL function and identify physical deficits which impact ability to perform ADLs (bathing, care of mouth/teeth, toileting, grooming, dressing, etc )  - Assess/evaluate cause of self-care deficits   - Assess range of motion  - Assess patient's mobility; develop plan if impaired  - Assess patient's need for assistive devices and provide as appropriate  - Encourage maximum independence but intervene and supervise when necessary  - Involve family in performance of ADLs  - Assess for home care needs following discharge   - Consider OT consult to assist with ADL evaluation and planning for discharge  - Provide patient education as appropriate  11/25/2019 1604 by Eliezer Real RN  Outcome: Completed  11/25/2019 1110 by Eliezer Real RN  Outcome: Progressing  Goal: Maintain or return mobility status to optimal level  Description  INTERVENTIONS:  - Assess patient's baseline mobility status (ambulation, transfers, stairs, etc )    - Identify cognitive and physical deficits and behaviors that affect mobility  - Identify mobility aids required to assist with transfers and/or ambulation (gait belt, sit-to-stand, lift, walker, cane, etc )  - [place] fall precautions as indicated by assessment  - Record patient progress and toleration of activity level on Mobility SBAR; progress patient to next Phase/Stage  - Instruct patient to call for assistance with activity based on assessment  - Consider rehabilitation consult to assist with strengthening/weightbearing, etc   11/25/2019 1604 by Richard Corrales RN  Outcome: Completed  11/25/2019 1110 by Richard Corrales RN  Outcome: Progressing     Problem: DISCHARGE PLANNING  Goal: Discharge to home or other facility with appropriate resources  Description  INTERVENTIONS:  - Identify barriers to discharge w/patient and caregiver  - Arrange for needed discharge resources and transportation as appropriate  - Identify discharge learning needs (meds, wound care, etc )  - Arrange for interpretive services to assist at discharge as needed  - Refer to Case Management Department for coordinating discharge planning if the patient needs post-hospital services based on physician/advanced practitioner order or complex needs related to functional status, cognitive ability, or social support system  11/25/2019 1604 by Richard Corrales RN  Outcome: Completed  11/25/2019 1110 by Richard Corrales RN  Outcome: Progressing     Problem: Knowledge Deficit  Goal: Patient/family/caregiver demonstrates understanding of disease process, treatment plan, medications, and discharge instructions  Description  Complete learning assessment and assess knowledge base    Interventions:  - Provide teaching at level of understanding  - Provide teaching via preferred learning methods  11/25/2019 1604 by Richard Corrales RN  Outcome: Completed  11/25/2019 1110 by Richard Corrales RN  Outcome: Progressing     Problem: METABOLIC, FLUID AND ELECTROLYTES - ADULT  Goal: Electrolytes maintained within normal limits  Description  INTERVENTIONS:  - Monitor labs and assess patient for signs and symptoms of electrolyte imbalances  - Administer electrolyte replacement as ordered  - Monitor response to electrolyte replacements, including repeat lab results as appropriate  - Instruct patient on fluid and nutrition as appropriate  11/25/2019 1604 by Richard Corrales RN  Outcome: Completed  11/25/2019 1110 by April Thompson CHRISTIE Sparrow  Outcome: Progressing  Goal: Fluid balance maintained  Description  INTERVENTIONS:  - Monitor labs   - Monitor I/O and WT  - Instruct patient on fluid and nutrition as appropriate  - Assess for signs & symptoms of volume excess or deficit  11/25/2019 1604 by Yordan Street RN  Outcome: Completed  11/25/2019 1110 by Yordan Street RN  Outcome: Progressing  Goal: Glucose maintained within target range  Description  INTERVENTIONS:  - Monitor Blood Glucose as ordered  - Assess for signs and symptoms of hyperglycemia and hypoglycemia  - Administer ordered medications to maintain glucose within target range  - Assess nutritional intake and initiate nutrition service referral as needed  11/25/2019 1604 by Yordan Street RN  Outcome: Completed  11/25/2019 1110 by Yordan Street RN  Outcome: Progressing     Problem: RESPIRATORY - ADULT  Goal: Achieves optimal ventilation and oxygenation  Description  INTERVENTIONS:  - Assess for changes in respiratory status  - Assess for changes in mentation and behavior  - Position to facilitate oxygenation and minimize respiratory effort  - Oxygen administered by appropriate delivery if ordered  - Initiate smoking cessation education as indicated  - Encourage broncho-pulmonary hygiene including cough, deep breathe, Incentive Spirometry  - Assess the need for suctioning and aspirate as needed  - Assess and instruct to report SOB or any respiratory difficulty  - Respiratory Therapy support as indicated  11/25/2019 1604 by Yordan Street RN  Outcome: Completed  11/25/2019 1110 by Yordan Street RN  Outcome: Progressing

## 2019-11-25 NOTE — NURSING NOTE
Patient discharged to home, medication and discharge instructions reviewed patient verbalized understanding, IV removed, belongings gathered, patient ambulated off unit
Patient verbally inappropriate  Talking about physically touching  this writer  Easily redirect 
no

## 2019-11-25 NOTE — PLAN OF CARE
Problem: Potential for Falls  Goal: Patient will remain free of falls  Description  INTERVENTIONS:  - Assess patient frequently for physical needs  -  Identify cognitive and physical deficits and behaviors that affect risk of falls  -  [place] fall precautions as indicated by assessment   - Educate patient/family on patient safety including physical limitations  - Instruct patient to call for assistance with activity based on assessment  - Modify environment to reduce risk of injury  - Consider OT/PT consult to assist with strengthening/mobility  Outcome: Progressing     Problem: Prexisting or High Potential for Compromised Skin Integrity  Goal: Skin integrity is maintained or improved  Description  INTERVENTIONS:  - Identify patients at risk for skin breakdown  - Assess and monitor skin integrity  - Assess and monitor nutrition and hydration status  - Monitor labs   - Assess for incontinence   - Turn and reposition patient  - Assist with mobility/ambulation  - Relieve pressure over bony prominences  - Avoid friction and shearing  - Provide appropriate hygiene as needed including keeping skin clean and dry  - Evaluate need for skin moisturizer/barrier cream  - Collaborate with interdisciplinary team   - Patient/family teaching  - Consider wound care consult   Outcome: Progressing     Problem: Nutrition/Hydration-ADULT  Goal: Nutrient/Hydration intake appropriate for improving, restoring or maintaining nutritional needs  Description  Monitor and assess patient's nutrition/hydration status for malnutrition  Collaborate with interdisciplinary team and initiate plan and interventions as ordered  Monitor patient's weight and dietary intake as ordered or per policy  Utilize nutrition screening tool and intervene as necessary  Determine patient's food preferences and provide high-protein, high-caloric foods as appropriate       INTERVENTIONS:  - Monitor oral intake, urinary output, labs, and treatment plans  - Assess nutrition and hydration status and recommend course of action  - Evaluate amount of meals eaten  - Assist patient with eating if necessary   - Allow adequate time for meals  - Recommend/ encourage appropriate diets, oral nutritional supplements, and vitamin/mineral supplements  - Order, calculate, and assess calorie counts as needed  - Recommend, monitor, and adjust tube feedings and TPN/PPN based on assessed needs  - Assess need for intravenous fluids  - Provide specific nutrition/hydration education as appropriate  - Include patient/family/caregiver in decisions related to nutrition  Outcome: Progressing     Problem: PAIN - ADULT  Goal: Verbalizes/displays adequate comfort level or baseline comfort level  Description  Interventions:  - Encourage patient to monitor pain and request assistance  - Assess pain using appropriate pain scale  - Administer analgesics based on type and severity of pain and evaluate response  - Implement non-pharmacological measures as appropriate and evaluate response  - Consider cultural and social influences on pain and pain management  - Notify physician/advanced practitioner if interventions unsuccessful or patient reports new pain  Outcome: Progressing     Problem: INFECTION - ADULT  Goal: Absence or prevention of progression during hospitalization  Description  INTERVENTIONS:  - Assess and monitor for signs and symptoms of infection  - Monitor lab/diagnostic results  - Monitor all insertion sites, i e  indwelling lines, tubes, and drains  - [place] appropriate cooling/warming therapies per order  - Administer medications as ordered  - Instruct and encourage patient and family to use good hand hygiene technique  - Identify and instruct in appropriate isolation precautions for identified infection/condition   Outcome: Progressing     Problem: SAFETY ADULT  Goal: Maintain or return to baseline ADL function  Description  INTERVENTIONS:  -  Assess patient's ability to carry out ADLs; assess patient's baseline for ADL function and identify physical deficits which impact ability to perform ADLs (bathing, care of mouth/teeth, toileting, grooming, dressing, etc )  - Assess/evaluate cause of self-care deficits   - Assess range of motion  - Assess patient's mobility; develop plan if impaired  - Assess patient's need for assistive devices and provide as appropriate  - Encourage maximum independence but intervene and supervise when necessary  - Involve family in performance of ADLs  - Assess for home care needs following discharge   - Consider OT consult to assist with ADL evaluation and planning for discharge  - Provide patient education as appropriate  Outcome: Progressing  Goal: Maintain or return mobility status to optimal level  Description  INTERVENTIONS:  - Assess patient's baseline mobility status (ambulation, transfers, stairs, etc )    - Identify cognitive and physical deficits and behaviors that affect mobility  - Identify mobility aids required to assist with transfers and/or ambulation (gait belt, sit-to-stand, lift, walker, cane, etc )  - [place] fall precautions as indicated by assessment  - Record patient progress and toleration of activity level on Mobility SBAR; progress patient to next Phase/Stage  - Instruct patient to call for assistance with activity based on assessment  - Consider rehabilitation consult to assist with strengthening/weightbearing, etc   Outcome: Progressing     Problem: DISCHARGE PLANNING  Goal: Discharge to home or other facility with appropriate resources  Description  INTERVENTIONS:  - Identify barriers to discharge w/patient and caregiver  - Arrange for needed discharge resources and transportation as appropriate  - Identify discharge learning needs (meds, wound care, etc )  - Arrange for interpretive services to assist at discharge as needed  - Refer to Case Management Department for coordinating discharge planning if the patient needs post-hospital services based on physician/advanced practitioner order or complex needs related to functional status, cognitive ability, or social support system  Outcome: Progressing     Problem: Knowledge Deficit  Goal: Patient/family/caregiver demonstrates understanding of disease process, treatment plan, medications, and discharge instructions  Description  Complete learning assessment and assess knowledge base    Interventions:  - Provide teaching at level of understanding  - Provide teaching via preferred learning methods  Outcome: Progressing     Problem: METABOLIC, FLUID AND ELECTROLYTES - ADULT  Goal: Electrolytes maintained within normal limits  Description  INTERVENTIONS:  - Monitor labs and assess patient for signs and symptoms of electrolyte imbalances  - Administer electrolyte replacement as ordered  - Monitor response to electrolyte replacements, including repeat lab results as appropriate  - Instruct patient on fluid and nutrition as appropriate  Outcome: Progressing  Goal: Fluid balance maintained  Description  INTERVENTIONS:  - Monitor labs   - Monitor I/O and WT  - Instruct patient on fluid and nutrition as appropriate  - Assess for signs & symptoms of volume excess or deficit  Outcome: Progressing  Goal: Glucose maintained within target range  Description  INTERVENTIONS:  - Monitor Blood Glucose as ordered  - Assess for signs and symptoms of hyperglycemia and hypoglycemia  - Administer ordered medications to maintain glucose within target range  - Assess nutritional intake and initiate nutrition service referral as needed  Outcome: Progressing     Problem: RESPIRATORY - ADULT  Goal: Achieves optimal ventilation and oxygenation  Description  INTERVENTIONS:  - Assess for changes in respiratory status  - Assess for changes in mentation and behavior  - Position to facilitate oxygenation and minimize respiratory effort  - Oxygen administered by appropriate delivery if ordered  - Initiate smoking cessation education as indicated  - Encourage broncho-pulmonary hygiene including cough, deep breathe, Incentive Spirometry  - Assess the need for suctioning and aspirate as needed  - Assess and instruct to report SOB or any respiratory difficulty  - Respiratory Therapy support as indicated  Outcome: Progressing

## 2019-11-25 NOTE — DISCHARGE SUMMARY
[facility] Discharge Summary - Medical Serenity Ramsay 64 y o  male MRN: [id number]    [address] [facility] Bed: Stefan Garces-* Encounter: [id number]    BRIEF OVERVIEW  Admitting Provider: Teddy Araya DO  Discharge Provider: Dr Colleen Mcnamara    Discharge To: Home, patient does not want rehab  Facility: NA    Outpatient Follow-Up: [facility]  Things to address at first follow up visit: Alcohol use, benign prostatic hyperplasia  Labs and results pending at discharge: none    Admission Date: 11/22/2019     Discharge Date: 11/25/2019    Primary Discharge Diagnosis  Principal Problem:    Acute alcoholic intoxication without complication ([facility] )  Active Problems:    COPD (chronic obstructive pulmonary disease) ([facility] )    Type 2 diabetes mellitus ([facility] )    Essential (primary) hypertension    Hypomagnesemia    Depression    Hx of CABG    HLD (hyperlipidemia)    History of prostate cancer    CAD (coronary artery disease)    Nicotine abuse    Back pain    Hypernatremia    GERD (gastroesophageal reflux disease)    SIRS (systemic inflammatory response syndrome) ([facility] )  Resolved Problems:    * No resolved hospital problems  *        Consulting Providers   none        [address] STAY    Procedures Performed/Pertinent Test results  -ethanol:  402  -hemoglobin A1c 5 4  -XR lumbar spine:  Stable multilevel disc degeneration as well as lower lumbar facet degeneration    HPI  (copied from H&P) 17-year-old male with past medical history of heavy alcohol abuse, diabetes, hypertension, hyperlipidemia, COPD, prostate cancer status post resection, CABG, CAD status post stent placement complains of bilateral 5/10 non radiating paraspinal back pain in the lumbar area x 2 days following lifting of heavy object  Pt denies any trauma or fall  No spinal tenderness, fever, chills  No loss of bowel/bladder function or saddle anesthesia   No dysuria, hematuria, but endorses increased urinary frequency  During interview patient appeared to be shaking endorsed recent 3 day binge drinking episode, with last drinking occurring in the morning of hospital presentation  Per chart review patient had bottle of vodka with him in triage  Pt reports he came to the ED for further back pain evaluation and requested alcohol rehab placement        Hospital Course  Patient was admitted to the general medical floor placed on CIWA precautions for alcohol detox  Patient required multiple doses of Ativan initially for alcohol withdrawal symptoms  Electrolytes were repleted as needed  Patient was maintained on all chronic medications  He was then discharged home in stable condition without symptoms of withdrawal   Patient declined inpatient alcohol rehab at this time      Physical Exam at Discharge  Please refer to daily progress note    Medications   albuterol 90 mcg/act inhaler   Commonly known as: PROVENTIL HFA,VENTOLIN HFA   Inhale 2 puffs every 4 (four) hours as needed for wheezing   Refills: 0           amLODIPine 5 mg tablet   Commonly known as: NORVASC   Take 5 mg by mouth daily   Refills: 0          clopidogrel 75 mg tablet   Commonly known as: PLAVIX   Take 1 tablet (75 mg total) by mouth daily   Refills: 0          esomeprazole 40 MG capsule   Commonly known as: NexIUM   Refills: 0          famotidine 20 mg tablet   Commonly known as: PEPCID   Take 20 mg by mouth 2 (two) times a day   Refills: 0          fluticasone-salmeterol 500-50 mcg/dose inhaler   Commonly known as: ADVAIR, WIXELA   Inhale 1 puff every 12 (twelve) hours   Refills: 0          folic acid 1 mg tablet   Commonly known as: FOLVITE   Take 1 tablet (1 mg total) by mouth daily   Refills: 0          fosinopril 10 mg tablet   Commonly known as: MONOPRIL   Take 10 mg by mouth daily   Refills: 0          magnesium oxide 400 mg   Commonly known as: MAG-OX   Take 1 tablet (400 mg total) by mouth daily   Refills: 0   What changed: Another medication with the same name was removed  Continue taking this medication, and follow the directions you see here  metFORMIN 1000 MG tablet   Commonly known as: GLUCOPHAGE   Take 1,000 mg by mouth 2 (two) times a day with meals   Refills: 0          metoprolol succinate 100 mg 24 hr tablet   Commonly known as: TOPROL-XL   Take 0 5 tablets (50 mg total) by mouth daily   Refills: 3          ONE TOUCH ULTRA TEST test strip   Generic drug: glucose blood   3 (three) times a day Test   Refills: 0          ranolazine 500 mg 12 hr tablet   Commonly known as: RANEXA   Take 1 tablet (500 mg total) by mouth 2 (two) times a day   Refills: 0          rosuvastatin 20 MG tablet   Commonly known as: CRESTOR   Take 1 tablet (20 mg total) by mouth daily   Refills: 0          sertraline 100 mg tablet   Commonly known as: ZOLOFT   Take 100 mg by mouth daily   Refills: 0          TAMSULOSIN HCL PO   Take 0 4 mg by mouth daily   Refills: 0          tiotropium 18 mcg inhalation capsule   Commonly known as: SPIRIVA   Place 18 mcg into inhaler and inhale daily   Refills: 0               Allergies  No Known Allergies    Diet restrictions: none  Activity restrictions: none  Code Status: Level 1 - Full Code    Discharge Condition: good      Discharge  Statement   I spent 25 minutes discharging the patient  This time was spent on the day of discharge  I had direct contact with the patient on the day of discharge  Additional documentation is required if more than 30 minutes were spent on discharge           Guero Silvestre MD

## 2019-12-22 ENCOUNTER — APPOINTMENT (EMERGENCY)
Dept: RADIOLOGY | Facility: HOSPITAL | Age: 59
DRG: 896 | End: 2019-12-22
Payer: MEDICARE

## 2019-12-22 ENCOUNTER — HOSPITAL ENCOUNTER (INPATIENT)
Facility: HOSPITAL | Age: 59
LOS: 3 days | Discharge: HOME HEALTH | DRG: 896 | End: 2019-12-25
Attending: EMERGENCY MEDICINE | Admitting: ANESTHESIOLOGY
Payer: MEDICARE

## 2019-12-22 DIAGNOSIS — F10.929 ALCOHOL INTOXICATION (HCC): ICD-10-CM

## 2019-12-22 DIAGNOSIS — E83.42 HYPOMAGNESEMIA: ICD-10-CM

## 2019-12-22 DIAGNOSIS — I10 ESSENTIAL (PRIMARY) HYPERTENSION: Chronic | ICD-10-CM

## 2019-12-22 DIAGNOSIS — R57.1 HYPOVOLEMIC SHOCK (HCC): Primary | ICD-10-CM

## 2019-12-22 DIAGNOSIS — Z72.0 NICOTINE ABUSE: Chronic | ICD-10-CM

## 2019-12-22 DIAGNOSIS — K21.9 GASTROESOPHAGEAL REFLUX DISEASE, ESOPHAGITIS PRESENCE NOT SPECIFIED: Chronic | ICD-10-CM

## 2019-12-22 DIAGNOSIS — N17.9 ACUTE RENAL FAILURE (ARF) (HCC): ICD-10-CM

## 2019-12-22 DIAGNOSIS — F10.929 ACUTE ALCOHOL INTOXICATION (HCC): ICD-10-CM

## 2019-12-22 DIAGNOSIS — F32.A DEPRESSION: Chronic | ICD-10-CM

## 2019-12-22 DIAGNOSIS — E86.0 DEHYDRATION: ICD-10-CM

## 2019-12-22 DIAGNOSIS — S22.49XA MULTIPLE RIB FRACTURES: ICD-10-CM

## 2019-12-22 DIAGNOSIS — F10.230 ALCOHOL WITHDRAWAL SYNDROME WITHOUT COMPLICATION (HCC): ICD-10-CM

## 2019-12-22 PROBLEM — R65.10 SIRS (SYSTEMIC INFLAMMATORY RESPONSE SYNDROME) (HCC): Status: RESOLVED | Noted: 2019-11-23 | Resolved: 2019-12-22

## 2019-12-22 PROBLEM — E87.0 HYPERNATREMIA: Status: RESOLVED | Noted: 2019-11-23 | Resolved: 2019-12-22

## 2019-12-22 PROBLEM — R19.7 DIARRHEA: Status: ACTIVE | Noted: 2019-12-22

## 2019-12-22 PROBLEM — I50.30 DIASTOLIC HEART FAILURE (HCC): Chronic | Status: ACTIVE | Noted: 2019-12-22

## 2019-12-22 PROBLEM — J44.9 COPD (CHRONIC OBSTRUCTIVE PULMONARY DISEASE) (HCC): Chronic | Status: ACTIVE | Noted: 2018-10-09

## 2019-12-22 PROBLEM — E11.9 TYPE 2 DIABETES MELLITUS (HCC): Chronic | Status: ACTIVE | Noted: 2018-10-09

## 2019-12-22 PROBLEM — R79.89 ELEVATED BRAIN NATRIURETIC PEPTIDE (BNP) LEVEL: Status: RESOLVED | Noted: 2019-06-07 | Resolved: 2019-12-22

## 2019-12-22 PROBLEM — Z91.199 MEDICAL NON-COMPLIANCE: Status: ACTIVE | Noted: 2019-12-22

## 2019-12-22 PROBLEM — Z91.19 MEDICAL NON-COMPLIANCE: Chronic | Status: ACTIVE | Noted: 2019-12-22

## 2019-12-22 PROBLEM — I50.30 DIASTOLIC HEART FAILURE (HCC): Status: ACTIVE | Noted: 2019-12-22

## 2019-12-22 PROBLEM — R57.9 SHOCK (HCC): Status: ACTIVE | Noted: 2019-12-22

## 2019-12-22 PROBLEM — R53.1 WEAKNESS: Status: RESOLVED | Noted: 2019-10-17 | Resolved: 2019-12-22

## 2019-12-22 PROBLEM — S42.302A LEFT HUMERAL FRACTURE: Status: RESOLVED | Noted: 2019-10-17 | Resolved: 2019-12-22

## 2019-12-22 PROBLEM — E78.5 HLD (HYPERLIPIDEMIA): Chronic | Status: ACTIVE | Noted: 2018-10-10

## 2019-12-22 PROBLEM — Z91.199 MEDICAL NON-COMPLIANCE: Chronic | Status: ACTIVE | Noted: 2019-12-22

## 2019-12-22 PROBLEM — I25.10 CAD (CORONARY ARTERY DISEASE): Chronic | Status: ACTIVE | Noted: 2019-06-07

## 2019-12-22 PROBLEM — F10.10 ALCOHOL ABUSE: Chronic | Status: ACTIVE | Noted: 2019-10-17

## 2019-12-22 PROBLEM — Z95.1 HX OF CABG: Chronic | Status: ACTIVE | Noted: 2018-10-10

## 2019-12-22 PROBLEM — Z91.19 MEDICAL NON-COMPLIANCE: Status: ACTIVE | Noted: 2019-12-22

## 2019-12-22 PROBLEM — R55 SYNCOPE AND COLLAPSE: Status: RESOLVED | Noted: 2019-06-07 | Resolved: 2019-12-22

## 2019-12-22 PROBLEM — J18.9 RIGHT LOWER LOBE PNEUMONIA: Status: RESOLVED | Noted: 2018-10-09 | Resolved: 2019-12-22
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
## 2019-12-22 PROBLEM — Z85.46 HISTORY OF PROSTATE CANCER: Chronic | Status: ACTIVE | Noted: 2019-06-07

## 2019-12-22 LAB
ABO GROUP BLD: NORMAL
ALBUMIN SERPL BCP-MCNC: 2.8 G/DL (ref 3.5–5)
ALBUMIN SERPL BCP-MCNC: 3.6 G/DL (ref 3.5–5)
ALP SERPL-CCNC: 66 U/L (ref 46–116)
ALP SERPL-CCNC: 82 U/L (ref 46–116)
ALT SERPL W P-5'-P-CCNC: 41 U/L (ref 12–78)
ALT SERPL W P-5'-P-CCNC: 47 U/L (ref 12–78)
AMPHETAMINES SERPL QL SCN: NEGATIVE
ANION GAP SERPL CALCULATED.3IONS-SCNC: 10 MMOL/L (ref 4–13)
ANION GAP SERPL CALCULATED.3IONS-SCNC: 11 MMOL/L (ref 4–13)
APTT PPP: 27 SECONDS (ref 25–32)
AST SERPL W P-5'-P-CCNC: 32 U/L (ref 5–45)
AST SERPL W P-5'-P-CCNC: 56 U/L (ref 5–45)
BARBITURATES UR QL: NEGATIVE
BASOPHILS # BLD MANUAL: 0 THOUSAND/UL (ref 0–0.1)
BASOPHILS # BLD MANUAL: 0.1 THOUSAND/UL (ref 0–0.1)
BASOPHILS NFR MAR MANUAL: 0 % (ref 0–1)
BASOPHILS NFR MAR MANUAL: 1 % (ref 0–1)
BENZODIAZ UR QL: POSITIVE
BILIRUB SERPL-MCNC: 0.3 MG/DL (ref 0.2–1)
BILIRUB SERPL-MCNC: 0.4 MG/DL (ref 0.2–1)
BILIRUB UR QL STRIP: NEGATIVE
BLD GP AB SCN SERPL QL: NEGATIVE
BUN SERPL-MCNC: 30 MG/DL (ref 5–25)
BUN SERPL-MCNC: 34 MG/DL (ref 5–25)
CA-I BLD-SCNC: 0.99 MMOL/L (ref 1.12–1.32)
CALCIUM SERPL-MCNC: 6.8 MG/DL (ref 8.3–10.1)
CALCIUM SERPL-MCNC: 8.4 MG/DL (ref 8.3–10.1)
CHLORIDE SERPL-SCNC: 102 MMOL/L (ref 100–108)
CHLORIDE SERPL-SCNC: 96 MMOL/L (ref 100–108)
CK SERPL-CCNC: 124 U/L (ref 39–308)
CLARITY UR: CLEAR
CO2 SERPL-SCNC: 24 MMOL/L (ref 21–32)
CO2 SERPL-SCNC: 25 MMOL/L (ref 21–32)
COCAINE UR QL: NEGATIVE
COLOR UR: YELLOW
CORTIS SERPL-MCNC: 6.8 UG/DL
CREAT SERPL-MCNC: 1.77 MG/DL (ref 0.6–1.3)
CREAT SERPL-MCNC: 2.22 MG/DL (ref 0.6–1.3)
CREAT UR-MCNC: 19.1 MG/DL
EOSINOPHIL # BLD MANUAL: 0.12 THOUSAND/UL (ref 0–0.4)
EOSINOPHIL # BLD MANUAL: 0.2 THOUSAND/UL (ref 0–0.4)
EOSINOPHIL NFR BLD MANUAL: 2 % (ref 0–6)
EOSINOPHIL NFR BLD MANUAL: 2 % (ref 0–6)
ERYTHROCYTE [DISTWIDTH] IN BLOOD BY AUTOMATED COUNT: 12.6 % (ref 11.6–15.1)
ERYTHROCYTE [DISTWIDTH] IN BLOOD BY AUTOMATED COUNT: 12.9 % (ref 11.6–15.1)
ETHANOL SERPL-MCNC: 289 MG/DL (ref 0–3)
GFR SERPL CREATININE-BSD FRML MDRD: 32 ML/MIN/1.73SQ M
GFR SERPL CREATININE-BSD FRML MDRD: 42 ML/MIN/1.73SQ M
GLUCOSE SERPL-MCNC: 102 MG/DL (ref 65–140)
GLUCOSE SERPL-MCNC: 105 MG/DL (ref 65–140)
GLUCOSE SERPL-MCNC: 106 MG/DL (ref 65–140)
GLUCOSE SERPL-MCNC: 82 MG/DL (ref 65–140)
GLUCOSE SERPL-MCNC: 95 MG/DL (ref 65–140)
GLUCOSE UR STRIP-MCNC: NEGATIVE MG/DL
HCT VFR BLD AUTO: 37.3 % (ref 36.5–49.3)
HCT VFR BLD AUTO: 42.1 % (ref 36.5–49.3)
HGB BLD-MCNC: 12.2 G/DL (ref 12–17)
HGB BLD-MCNC: 12.4 G/DL (ref 12–17)
HGB BLD-MCNC: 14.4 G/DL (ref 12–17)
HGB UR QL STRIP.AUTO: NEGATIVE
HOLD SPECIMEN: NORMAL
INR PPP: 0.93 (ref 0.91–1.09)
KETONES UR STRIP-MCNC: NEGATIVE MG/DL
LACTATE SERPL-SCNC: 1.6 MMOL/L (ref 0.5–2)
LACTATE SERPL-SCNC: 2.9 MMOL/L (ref 0.5–2)
LEUKOCYTE ESTERASE UR QL STRIP: NEGATIVE
LIPASE SERPL-CCNC: 918 U/L (ref 73–393)
LYMPHOCYTES # BLD AUTO: 1.65 THOUSAND/UL (ref 0.6–4.47)
LYMPHOCYTES # BLD AUTO: 2.39 THOUSAND/UL (ref 0.6–4.47)
LYMPHOCYTES # BLD AUTO: 24 % (ref 14–44)
LYMPHOCYTES # BLD AUTO: 28 % (ref 14–44)
MAGNESIUM SERPL-MCNC: 1.5 MG/DL (ref 1.6–2.6)
MAGNESIUM SERPL-MCNC: 2 MG/DL (ref 1.6–2.6)
MCH RBC QN AUTO: 33 PG (ref 26.8–34.3)
MCH RBC QN AUTO: 33.3 PG (ref 26.8–34.3)
MCHC RBC AUTO-ENTMCNC: 32.7 G/DL (ref 31.4–37.4)
MCHC RBC AUTO-ENTMCNC: 34.2 G/DL (ref 31.4–37.4)
MCV RBC AUTO: 101 FL (ref 82–98)
MCV RBC AUTO: 97 FL (ref 82–98)
METHADONE UR QL: NEGATIVE
MONOCYTES # BLD AUTO: 0.65 THOUSAND/UL (ref 0–1.22)
MONOCYTES # BLD AUTO: 1.29 THOUSAND/UL (ref 0–1.22)
MONOCYTES NFR BLD: 11 % (ref 4–12)
MONOCYTES NFR BLD: 13 % (ref 4–12)
NEUTROPHILS # BLD MANUAL: 3.48 THOUSAND/UL (ref 1.85–7.62)
NEUTROPHILS # BLD MANUAL: 5.87 THOUSAND/UL (ref 1.85–7.62)
NEUTS SEG NFR BLD AUTO: 59 % (ref 43–75)
NEUTS SEG NFR BLD AUTO: 59 % (ref 43–75)
NITRITE UR QL STRIP: NEGATIVE
NRBC BLD AUTO-RTO: 0 /100 WBCS
NRBC BLD AUTO-RTO: 0 /100 WBCS
NT-PROBNP SERPL-MCNC: 481 PG/ML
OPIATES UR QL SCN: NEGATIVE
PCP UR QL: NEGATIVE
PH UR STRIP.AUTO: 5.5 [PH]
PHOSPHATE SERPL-MCNC: 3.9 MG/DL (ref 2.7–4.5)
PLATELET # BLD AUTO: 57 THOUSANDS/UL (ref 149–390)
PLATELET # BLD AUTO: 72 THOUSANDS/UL (ref 149–390)
PLATELET BLD QL SMEAR: ABNORMAL
PLATELET BLD QL SMEAR: ABNORMAL
PMV BLD AUTO: 11.8 FL (ref 8.9–12.7)
PMV BLD AUTO: 12.1 FL (ref 8.9–12.7)
POTASSIUM SERPL-SCNC: 3.5 MMOL/L (ref 3.5–5.3)
POTASSIUM SERPL-SCNC: 3.6 MMOL/L (ref 3.5–5.3)
PROCALCITONIN SERPL-MCNC: 0.09 NG/ML
PROT SERPL-MCNC: 6.1 G/DL (ref 6.4–8.2)
PROT SERPL-MCNC: 7.7 G/DL (ref 6.4–8.2)
PROT UR STRIP-MCNC: NEGATIVE MG/DL
PROTHROMBIN TIME: 10 SECONDS (ref 9.8–12)
RBC # BLD AUTO: 3.7 MILLION/UL (ref 3.88–5.62)
RBC # BLD AUTO: 4.33 MILLION/UL (ref 3.88–5.62)
RBC MORPH BLD: NORMAL
RBC MORPH BLD: NORMAL
RH BLD: POSITIVE
SODIUM 24H UR-SCNC: 43 MOL/L
SODIUM SERPL-SCNC: 132 MMOL/L (ref 136–145)
SODIUM SERPL-SCNC: 136 MMOL/L (ref 136–145)
SP GR UR STRIP.AUTO: <=1.005 (ref 1–1.03)
SPECIMEN EXPIRATION DATE: NORMAL
THC UR QL: NEGATIVE
TOTAL CELLS COUNTED SPEC: 100
TOTAL CELLS COUNTED SPEC: 100
TROPONIN I SERPL-MCNC: <0.02 NG/ML
TSH SERPL DL<=0.05 MIU/L-ACNC: 0.28 UIU/ML (ref 0.36–3.74)
UROBILINOGEN UR QL STRIP.AUTO: 0.2 E.U./DL
VARIANT LYMPHS # BLD AUTO: 1 %
WBC # BLD AUTO: 5.89 THOUSAND/UL (ref 4.31–10.16)
WBC # BLD AUTO: 9.95 THOUSAND/UL (ref 4.31–10.16)

## 2019-12-22 PROCEDURE — 83735 ASSAY OF MAGNESIUM: CPT | Performed by: EMERGENCY MEDICINE

## 2019-12-22 PROCEDURE — 84100 ASSAY OF PHOSPHORUS: CPT | Performed by: NURSE PRACTITIONER

## 2019-12-22 PROCEDURE — 70450 CT HEAD/BRAIN W/O DYE: CPT

## 2019-12-22 PROCEDURE — 96365 THER/PROPH/DIAG IV INF INIT: CPT

## 2019-12-22 PROCEDURE — 80307 DRUG TEST PRSMV CHEM ANLYZR: CPT | Performed by: NURSE PRACTITIONER

## 2019-12-22 PROCEDURE — 86900 BLOOD TYPING SEROLOGIC ABO: CPT | Performed by: EMERGENCY MEDICINE

## 2019-12-22 PROCEDURE — 86901 BLOOD TYPING SEROLOGIC RH(D): CPT | Performed by: EMERGENCY MEDICINE

## 2019-12-22 PROCEDURE — 96360 HYDRATION IV INFUSION INIT: CPT

## 2019-12-22 PROCEDURE — 85018 HEMOGLOBIN: CPT | Performed by: PHYSICIAN ASSISTANT

## 2019-12-22 PROCEDURE — 82533 TOTAL CORTISOL: CPT | Performed by: NURSE PRACTITIONER

## 2019-12-22 PROCEDURE — 71260 CT THORAX DX C+: CPT

## 2019-12-22 PROCEDURE — 84484 ASSAY OF TROPONIN QUANT: CPT | Performed by: EMERGENCY MEDICINE

## 2019-12-22 PROCEDURE — 82948 REAGENT STRIP/BLOOD GLUCOSE: CPT

## 2019-12-22 PROCEDURE — 87081 CULTURE SCREEN ONLY: CPT | Performed by: ANESTHESIOLOGY

## 2019-12-22 PROCEDURE — 80053 COMPREHEN METABOLIC PANEL: CPT | Performed by: NURSE PRACTITIONER

## 2019-12-22 PROCEDURE — 96375 TX/PRO/DX INJ NEW DRUG ADDON: CPT

## 2019-12-22 PROCEDURE — 85027 COMPLETE CBC AUTOMATED: CPT | Performed by: PHYSICIAN ASSISTANT

## 2019-12-22 PROCEDURE — 85007 BL SMEAR W/DIFF WBC COUNT: CPT | Performed by: EMERGENCY MEDICINE

## 2019-12-22 PROCEDURE — 93005 ELECTROCARDIOGRAM TRACING: CPT

## 2019-12-22 PROCEDURE — 85027 COMPLETE CBC AUTOMATED: CPT | Performed by: EMERGENCY MEDICINE

## 2019-12-22 PROCEDURE — 86850 RBC ANTIBODY SCREEN: CPT | Performed by: EMERGENCY MEDICINE

## 2019-12-22 PROCEDURE — 84145 PROCALCITONIN (PCT): CPT | Performed by: PHYSICIAN ASSISTANT

## 2019-12-22 PROCEDURE — 36415 COLL VENOUS BLD VENIPUNCTURE: CPT | Performed by: EMERGENCY MEDICINE

## 2019-12-22 PROCEDURE — 99285 EMERGENCY DEPT VISIT HI MDM: CPT

## 2019-12-22 PROCEDURE — 83605 ASSAY OF LACTIC ACID: CPT | Performed by: NURSE PRACTITIONER

## 2019-12-22 PROCEDURE — 85610 PROTHROMBIN TIME: CPT | Performed by: EMERGENCY MEDICINE

## 2019-12-22 PROCEDURE — 74177 CT ABD & PELVIS W/CONTRAST: CPT

## 2019-12-22 PROCEDURE — 82330 ASSAY OF CALCIUM: CPT | Performed by: NURSE PRACTITIONER

## 2019-12-22 PROCEDURE — 83690 ASSAY OF LIPASE: CPT | Performed by: EMERGENCY MEDICINE

## 2019-12-22 PROCEDURE — 80320 DRUG SCREEN QUANTALCOHOLS: CPT | Performed by: EMERGENCY MEDICINE

## 2019-12-22 PROCEDURE — 72125 CT NECK SPINE W/O DYE: CPT

## 2019-12-22 PROCEDURE — NC001 PR NO CHARGE: Performed by: ANESTHESIOLOGY

## 2019-12-22 PROCEDURE — 94760 N-INVAS EAR/PLS OXIMETRY 1: CPT

## 2019-12-22 PROCEDURE — 82550 ASSAY OF CK (CPK): CPT | Performed by: NURSE PRACTITIONER

## 2019-12-22 PROCEDURE — 83735 ASSAY OF MAGNESIUM: CPT | Performed by: NURSE PRACTITIONER

## 2019-12-22 PROCEDURE — 85007 BL SMEAR W/DIFF WBC COUNT: CPT | Performed by: PHYSICIAN ASSISTANT

## 2019-12-22 PROCEDURE — 99223 1ST HOSP IP/OBS HIGH 75: CPT | Performed by: ANESTHESIOLOGY

## 2019-12-22 PROCEDURE — 85730 THROMBOPLASTIN TIME PARTIAL: CPT | Performed by: EMERGENCY MEDICINE

## 2019-12-22 PROCEDURE — 83605 ASSAY OF LACTIC ACID: CPT | Performed by: EMERGENCY MEDICINE

## 2019-12-22 PROCEDURE — 81003 URINALYSIS AUTO W/O SCOPE: CPT | Performed by: EMERGENCY MEDICINE

## 2019-12-22 PROCEDURE — 84443 ASSAY THYROID STIM HORMONE: CPT | Performed by: NURSE PRACTITIONER

## 2019-12-22 PROCEDURE — 84300 ASSAY OF URINE SODIUM: CPT | Performed by: NURSE PRACTITIONER

## 2019-12-22 PROCEDURE — 96361 HYDRATE IV INFUSION ADD-ON: CPT

## 2019-12-22 PROCEDURE — 80053 COMPREHEN METABOLIC PANEL: CPT | Performed by: EMERGENCY MEDICINE

## 2019-12-22 PROCEDURE — 94640 AIRWAY INHALATION TREATMENT: CPT

## 2019-12-22 PROCEDURE — 82570 ASSAY OF URINE CREATININE: CPT | Performed by: NURSE PRACTITIONER

## 2019-12-22 PROCEDURE — 87040 BLOOD CULTURE FOR BACTERIA: CPT | Performed by: EMERGENCY MEDICINE

## 2019-12-22 PROCEDURE — 83880 ASSAY OF NATRIURETIC PEPTIDE: CPT | Performed by: EMERGENCY MEDICINE

## 2019-12-22 RX ORDER — IPRATROPIUM BROMIDE AND ALBUTEROL SULFATE 2.5; .5 MG/3ML; MG/3ML
3 SOLUTION RESPIRATORY (INHALATION)
Status: DISCONTINUED | OUTPATIENT
Start: 2019-12-22 | End: 2019-12-23

## 2019-12-22 RX ORDER — ALBUMIN (HUMAN) 12.5 G/50ML
12.5 SOLUTION INTRAVENOUS ONCE
Status: COMPLETED | OUTPATIENT
Start: 2019-12-22 | End: 2019-12-22

## 2019-12-22 RX ORDER — ONDANSETRON 2 MG/ML
4 INJECTION INTRAMUSCULAR; INTRAVENOUS EVERY 8 HOURS PRN
Status: DISCONTINUED | OUTPATIENT
Start: 2019-12-22 | End: 2019-12-25 | Stop reason: HOSPADM

## 2019-12-22 RX ORDER — THIAMINE MONONITRATE (VIT B1) 100 MG
100 TABLET ORAL DAILY
Status: DISCONTINUED | OUTPATIENT
Start: 2019-12-23 | End: 2019-12-25 | Stop reason: HOSPADM

## 2019-12-22 RX ORDER — CLOPIDOGREL BISULFATE 75 MG/1
75 TABLET ORAL DAILY
Status: DISCONTINUED | OUTPATIENT
Start: 2019-12-23 | End: 2019-12-25 | Stop reason: HOSPADM

## 2019-12-22 RX ORDER — MAGNESIUM SULFATE HEPTAHYDRATE 40 MG/ML
2 INJECTION, SOLUTION INTRAVENOUS ONCE
Status: COMPLETED | OUTPATIENT
Start: 2019-12-22 | End: 2019-12-22

## 2019-12-22 RX ORDER — SODIUM CHLORIDE, SODIUM GLUCONATE, SODIUM ACETATE, POTASSIUM CHLORIDE, MAGNESIUM CHLORIDE, SODIUM PHOSPHATE, DIBASIC, AND POTASSIUM PHOSPHATE .53; .5; .37; .037; .03; .012; .00082 G/100ML; G/100ML; G/100ML; G/100ML; G/100ML; G/100ML; G/100ML
150 INJECTION, SOLUTION INTRAVENOUS CONTINUOUS
Status: DISCONTINUED | OUTPATIENT
Start: 2019-12-22 | End: 2019-12-23

## 2019-12-22 RX ORDER — POTASSIUM CHLORIDE 14.9 MG/ML
20 INJECTION INTRAVENOUS ONCE
Status: COMPLETED | OUTPATIENT
Start: 2019-12-22 | End: 2019-12-22

## 2019-12-22 RX ORDER — TAMSULOSIN HYDROCHLORIDE 0.4 MG/1
0.4 CAPSULE ORAL DAILY
Status: DISCONTINUED | OUTPATIENT
Start: 2019-12-22 | End: 2019-12-24

## 2019-12-22 RX ORDER — THIAMINE HYDROCHLORIDE 100 MG/ML
100 INJECTION, SOLUTION INTRAMUSCULAR; INTRAVENOUS DAILY
Status: DISCONTINUED | OUTPATIENT
Start: 2019-12-23 | End: 2019-12-22

## 2019-12-22 RX ORDER — SODIUM CHLORIDE, SODIUM GLUCONATE, SODIUM ACETATE, POTASSIUM CHLORIDE, MAGNESIUM CHLORIDE, SODIUM PHOSPHATE, DIBASIC, AND POTASSIUM PHOSPHATE .53; .5; .37; .037; .03; .012; .00082 G/100ML; G/100ML; G/100ML; G/100ML; G/100ML; G/100ML; G/100ML
1000 INJECTION, SOLUTION INTRAVENOUS ONCE
Status: COMPLETED | OUTPATIENT
Start: 2019-12-22 | End: 2019-12-22

## 2019-12-22 RX ORDER — FOLIC ACID 1 MG/1
1 TABLET ORAL DAILY
Status: DISCONTINUED | OUTPATIENT
Start: 2019-12-22 | End: 2019-12-22

## 2019-12-22 RX ORDER — FOLIC ACID 1 MG/1
1 TABLET ORAL DAILY
Status: DISCONTINUED | OUTPATIENT
Start: 2019-12-23 | End: 2019-12-25 | Stop reason: HOSPADM

## 2019-12-22 RX ORDER — ATORVASTATIN CALCIUM 40 MG/1
40 TABLET, FILM COATED ORAL
Status: DISCONTINUED | OUTPATIENT
Start: 2019-12-22 | End: 2019-12-25 | Stop reason: HOSPADM

## 2019-12-22 RX ORDER — HEPARIN SODIUM 5000 [USP'U]/ML
5000 INJECTION, SOLUTION INTRAVENOUS; SUBCUTANEOUS EVERY 8 HOURS SCHEDULED
Status: DISCONTINUED | OUTPATIENT
Start: 2019-12-22 | End: 2019-12-25 | Stop reason: HOSPADM

## 2019-12-22 RX ORDER — FLUTICASONE FUROATE AND VILANTEROL 200; 25 UG/1; UG/1
1 POWDER RESPIRATORY (INHALATION)
Status: DISCONTINUED | OUTPATIENT
Start: 2019-12-22 | End: 2019-12-25 | Stop reason: HOSPADM

## 2019-12-22 RX ORDER — SODIUM CHLORIDE, SODIUM GLUCONATE, SODIUM ACETATE, POTASSIUM CHLORIDE, MAGNESIUM CHLORIDE, SODIUM PHOSPHATE, DIBASIC, AND POTASSIUM PHOSPHATE .53; .5; .37; .037; .03; .012; .00082 G/100ML; G/100ML; G/100ML; G/100ML; G/100ML; G/100ML; G/100ML
125 INJECTION, SOLUTION INTRAVENOUS CONTINUOUS
Status: DISCONTINUED | OUTPATIENT
Start: 2019-12-22 | End: 2019-12-22

## 2019-12-22 RX ORDER — FAMOTIDINE 20 MG/1
20 TABLET, FILM COATED ORAL 2 TIMES DAILY
Status: DISCONTINUED | OUTPATIENT
Start: 2019-12-22 | End: 2019-12-23

## 2019-12-22 RX ADMIN — THIAMINE HYDROCHLORIDE 100 MG: 100 INJECTION, SOLUTION INTRAMUSCULAR; INTRAVENOUS at 07:29

## 2019-12-22 RX ADMIN — SODIUM CHLORIDE 1000 ML: 0.9 INJECTION, SOLUTION INTRAVENOUS at 02:37

## 2019-12-22 RX ADMIN — SODIUM CHLORIDE 1000 ML: 0.9 INJECTION, SOLUTION INTRAVENOUS at 02:05

## 2019-12-22 RX ADMIN — TAMSULOSIN HYDROCHLORIDE 0.4 MG: 0.4 CAPSULE ORAL at 08:11

## 2019-12-22 RX ADMIN — NOREPINEPHRINE BITARTRATE 2 MCG/MIN: 1 INJECTION, SOLUTION, CONCENTRATE INTRAVENOUS at 03:46

## 2019-12-22 RX ADMIN — SODIUM CHLORIDE 1000 ML: 0.9 INJECTION, SOLUTION INTRAVENOUS at 02:01

## 2019-12-22 RX ADMIN — IPRATROPIUM BROMIDE AND ALBUTEROL SULFATE 3 ML: 2.5; .5 SOLUTION RESPIRATORY (INHALATION) at 13:08

## 2019-12-22 RX ADMIN — HEPARIN SODIUM 5000 UNITS: 5000 INJECTION INTRAVENOUS; SUBCUTANEOUS at 06:51

## 2019-12-22 RX ADMIN — IOHEXOL 100 ML: 350 INJECTION, SOLUTION INTRAVENOUS at 02:31

## 2019-12-22 RX ADMIN — PHENOBARBITAL SODIUM 180 MG: 65 INJECTION INTRAMUSCULAR; INTRAVENOUS at 15:15

## 2019-12-22 RX ADMIN — SODIUM CHLORIDE 1000 ML: 0.9 INJECTION, SOLUTION INTRAVENOUS at 03:43

## 2019-12-22 RX ADMIN — SODIUM CHLORIDE, SODIUM GLUCONATE, SODIUM ACETATE, POTASSIUM CHLORIDE AND MAGNESIUM CHLORIDE 150 ML/HR: 526; 502; 368; 37; 30 INJECTION, SOLUTION INTRAVENOUS at 17:00

## 2019-12-22 RX ADMIN — PHENOBARBITAL SODIUM 180 MG: 65 INJECTION INTRAMUSCULAR; INTRAVENOUS at 11:00

## 2019-12-22 RX ADMIN — PHENOBARBITAL SODIUM 240 MG: 65 INJECTION INTRAMUSCULAR; INTRAVENOUS at 08:57

## 2019-12-22 RX ADMIN — SODIUM CHLORIDE, SODIUM GLUCONATE, SODIUM ACETATE, POTASSIUM CHLORIDE AND MAGNESIUM CHLORIDE 125 ML/HR: 526; 502; 368; 37; 30 INJECTION, SOLUTION INTRAVENOUS at 04:39

## 2019-12-22 RX ADMIN — MAGNESIUM SULFATE HEPTAHYDRATE 2 G: 40 INJECTION, SOLUTION INTRAVENOUS at 03:04

## 2019-12-22 RX ADMIN — POTASSIUM CHLORIDE 20 MEQ: 14.9 INJECTION, SOLUTION INTRAVENOUS at 05:18

## 2019-12-22 RX ADMIN — IPRATROPIUM BROMIDE AND ALBUTEROL SULFATE 3 ML: 2.5; .5 SOLUTION RESPIRATORY (INHALATION) at 07:28

## 2019-12-22 RX ADMIN — FLUTICASONE FUROATE AND VILANTEROL TRIFENATATE 1 PUFF: 200; 25 POWDER RESPIRATORY (INHALATION) at 08:56

## 2019-12-22 RX ADMIN — IPRATROPIUM BROMIDE AND ALBUTEROL SULFATE 3 ML: 2.5; .5 SOLUTION RESPIRATORY (INHALATION) at 20:29

## 2019-12-22 RX ADMIN — HEPARIN SODIUM 5000 UNITS: 5000 INJECTION INTRAVENOUS; SUBCUTANEOUS at 15:14

## 2019-12-22 RX ADMIN — FAMOTIDINE 20 MG: 20 TABLET ORAL at 08:11

## 2019-12-22 RX ADMIN — FOLIC ACID 1 MG: 5 INJECTION, SOLUTION INTRAMUSCULAR; INTRAVENOUS; SUBCUTANEOUS at 07:31

## 2019-12-22 RX ADMIN — FAMOTIDINE 20 MG: 20 TABLET ORAL at 17:04

## 2019-12-22 RX ADMIN — SODIUM CHLORIDE, SODIUM GLUCONATE, SODIUM ACETATE, POTASSIUM CHLORIDE AND MAGNESIUM CHLORIDE 1000 ML: 526; 502; 368; 37; 30 INJECTION, SOLUTION INTRAVENOUS at 09:00

## 2019-12-22 RX ADMIN — ATORVASTATIN CALCIUM 40 MG: 40 TABLET, FILM COATED ORAL at 17:04

## 2019-12-22 RX ADMIN — HEPARIN SODIUM 5000 UNITS: 5000 INJECTION INTRAVENOUS; SUBCUTANEOUS at 21:19

## 2019-12-22 RX ADMIN — SODIUM CHLORIDE, SODIUM GLUCONATE, SODIUM ACETATE, POTASSIUM CHLORIDE AND MAGNESIUM CHLORIDE 150 ML/HR: 526; 502; 368; 37; 30 INJECTION, SOLUTION INTRAVENOUS at 11:04

## 2019-12-22 RX ADMIN — SODIUM CHLORIDE, SODIUM GLUCONATE, SODIUM ACETATE, POTASSIUM CHLORIDE AND MAGNESIUM CHLORIDE 150 ML/HR: 526; 502; 368; 37; 30 INJECTION, SOLUTION INTRAVENOUS at 22:44

## 2019-12-22 RX ADMIN — ALBUMIN (HUMAN) 12.5 G: 25 SOLUTION INTRAVENOUS at 06:51

## 2019-12-22 RX ADMIN — SODIUM CHLORIDE, SODIUM GLUCONATE, SODIUM ACETATE, POTASSIUM CHLORIDE AND MAGNESIUM CHLORIDE 75 ML/HR: 526; 502; 368; 37; 30 INJECTION, SOLUTION INTRAVENOUS at 06:51

## 2019-12-22 NOTE — ASSESSMENT & PLAN NOTE
Patient presents to the ED after reported fall at home  Per EMS, he was found outside of his home  He arrives hypotensive with SBP in the 70s and is intoxicated with medical alcohol 289  Patient had an admission in June of this year with similar presentation  Recent outpatient PCP visit had documented BP reading of 80/60  · Unclear cause of hypotension but likely secondary to hypovolemic shock in the setting of reported diarrhea x 1 week, daily alcohol use and poor PO intake   · Patient received 4L NSS in the ER and started on low dose levophed to maintain MAP >65  · Will continue hydration with Isolyte at 125mL an hour  · Wean levophed to off as able   · Hold home antihypertensives  · No leukocytosis or SIRs criteria other than hypotension to suggest septic shock  · Lactic 2 9, will continue to trend  · CT c/a/p unremarkable  · Lipase is 980 but pancreas/gallbladder are unremarkable on CT scan   Abdominal exam is benign   · Will send C diff and stool panel

## 2019-12-22 NOTE — ED PROVIDER NOTES
History  Chief Complaint   Patient presents with    Fall     pt states fell, hit head, hx of alcohol abuse  c/o pain left side of head    Abdominal Pain     pt diarrhea and lower abdominal pain for over 1 week, states "it came out blood 1 night"     Pt arrives with EMS after an unwitnessed fall  Pt admits that he is an alcoholic, last drink was today  He states that he has had 1wk of vomiting and diarrhea, with diarrhea now turning bloody  He states that he attempted to walk this morning, and lost consciousness  Pt is hypotensive on initial eval  c-collar placed, large bore IVs placed with IVF and pt taken for trauma scan  History provided by:  Patient  History limited by:  Acuity of condition   used: No        Prior to Admission Medications   Prescriptions Last Dose Informant Patient Reported? Taking?    Blood Glucose Monitoring Suppl (ONE TOUCH ULTRA MINI) w/Device KIT   Yes No   Sig: Use as directed   ONE TOUCH ULTRA TEST test strip   Yes No   Sig: 3 (three) times a day Test   [facility] LANCETS FINE MISC   Yes No   Sig: 3 (three) times a day Test   TAMSULOSIN HCL PO  Self Yes No   Sig: Take 0 4 mg by mouth daily   albuterol (PROVENTIL HFA,VENTOLIN HFA) 90 mcg/act inhaler   No No   Sig: Inhale 2 puffs every 4 (four) hours as needed for wheezing   amLODIPine (NORVASC) 5 mg tablet   Yes No   Sig: Take 5 mg by mouth daily   clopidogrel (PLAVIX) 75 mg tablet   No No   Sig: Take 1 tablet (75 mg total) by mouth daily   esomeprazole (NexIUM) 40 MG capsule   Yes No   famotidine (PEPCID) 20 mg tablet   Yes No   Sig: Take 20 mg by mouth 2 (two) times a day   fluticasone-salmeterol (ADVAIR) 500-50 mcg/dose   Yes No   Sig: Inhale 1 puff every 12 (twelve) hours   folic acid (FOLVITE) 1 mg tablet   No No   Sig: Take 1 tablet (1 mg total) by mouth daily   Patient not taking: Reported on 11/22/2019   fosinopril (MONOPRIL) 10 mg tablet   Yes No   Sig: Take 10 mg by mouth daily    magnesium oxide (MAG-OX) 400 mg   No No   Sig: Take 1 tablet (400 mg total) by mouth daily   Patient not taking: Reported on 11/22/2019   metFORMIN (GLUCOPHAGE) 1000 MG tablet   Yes No   Sig: Take 1,000 mg by mouth 2 (two) times a day with meals   metoprolol succinate (TOPROL-XL) 100 mg 24 hr tablet   No No   Sig: Take 0 5 tablets (50 mg total) by mouth daily   ranolazine (RANEXA) 500 mg 12 hr tablet   No No   Sig: Take 1 tablet (500 mg total) by mouth 2 (two) times a day   rosuvastatin (CRESTOR) 20 MG tablet   No No   Sig: Take 1 tablet (20 mg total) by mouth daily   sertraline (ZOLOFT) 100 mg tablet   Yes No   Sig: Take 100 mg by mouth daily    tiotropium (SPIRIVA) 18 mcg inhalation capsule  Self Yes No   Sig: Place 18 mcg into inhaler and inhale daily      Facility-Administered Medications: None       Past Medical History:   Diagnosis Date    Cancer [facility])     prostate ca,had radiation    Cardiac disease     stents,then triple bypass    [facility] [address] [facility]     [facility]     ETOH abuse     Hx of heart artery stent     2014    Hyperlipidemia     Hypertension     [facility]     Renal disorder     pyelonephritis    S/P CABG x 1     2004       Past Surgical History:   Procedure Laterality Date    CORONARY ARTERY BYPASS GRAFT  2004    TONSILLECTOMY         Family History   Problem Relation Age of Onset    Diabetes Mother     Uterine cancer Mother     COPD Father     Hypertension Father      I have reviewed and agree with the history as documented      Social History     Tobacco Use    Smoking status: Current Every Day Smoker     Packs/day: 1 50     Years: 40 00     Pack years: 60 00    Smokeless tobacco: Never Used   Substance Use Topics    Alcohol use: Yes     Frequency: 4 or more times a week     Drinks per session: 10 or more     Binge frequency: Daily or almost daily     Comment: 1/5 of vodka per day    Drug use: No        Review of Systems   Unable to perform ROS: Acuity of condition   Gastrointestinal: Positive for abdominal pain, blood in stool, diarrhea, nausea and vomiting  Neurological: Positive for syncope and weakness  Negative for facial asymmetry  Physical Exam  Physical Exam   Constitutional: He is oriented to person, place, and time  He appears well-developed  He appears toxic  No distress  HENT:   Head: Normocephalic and atraumatic  Eyes: Pupils are equal, round, and reactive to light  EOM are normal  Scleral icterus is present  Cardiovascular: Normal rate and normal heart sounds  Pulmonary/Chest: Effort normal and breath sounds normal    Abdominal: Bowel sounds are decreased  There is tenderness in the epigastric area  Neurological: He is alert and oriented to person, place, and time  Skin: Capillary refill takes less than 2 seconds  No cyanosis  Psychiatric: His affect is blunt  His speech is slurred  Nursing note and vitals reviewed        Vital Signs  ED Triage Vitals   Temperature Pulse Respirations Blood Pressure SpO2   12/22/19 0152 12/22/19 0152 12/22/19 0152 12/22/19 0152 12/22/19 0152   (!) 97 3 °F (36 3 °C) 72 20 (!) 76/47 94 %      Temp Source Heart Rate Source Patient Position - Orthostatic VS BP Location FiO2 (%)   12/22/19 0152 12/22/19 0213 12/22/19 0152 12/22/19 0152 --   Tympanic Monitor Lying Right arm       Pain Score       12/22/19 0152       5           Vitals:    12/22/19 0615 12/22/19 0630 12/22/19 0645 12/22/19 0700   BP: (!) 80/50 (!) 89/56 92/54 92/55   Pulse: 71 69 71 70   Patient Position - Orthostatic VS:    Lying         Visual Acuity      ED Medications  Medications   norepinephrine (LEVOPHED) 4 mg (STANDARD CONCENTRATION) IV in sodium chloride 0 9% 250 mL (2 mcg/min Intravenous Restarted 12/22/19 0606)   famotidine (PEPCID) tablet 20 mg (20 mg Oral Given 12/22/19 0811)   atorvastatin (LIPITOR) tablet 40 mg (has no administration in time range)   tamsulosin (FLOMAX) capsule 0 4 mg (0 4 mg Oral Given 12/22/19 0811)   heparin (porcine) subcutaneous injection 5,000 Units (5,000 Units Subcutaneous Given 67/55/14 1333)   folic acid (FOLVITE) tablet 1 mg (has no administration in time range)   fluticasone-vilanterol (BREO ELLIPTA) 200-25 MCG/INH inhaler 1 puff (has no administration in time range)   insulin lispro (HumaLOG) 100 units/mL subcutaneous injection 1-5 Units (1 Units Subcutaneous Not Given 12/22/19 0701)   insulin lispro (HumaLOG) 100 units/mL subcutaneous injection 1-5 Units (has no administration in time range)   multi-electrolyte (PLASMALYTE-A/ISOLYTE-S PH 7 4) IV solution (150 mL/hr Intravenous Rate/Dose Change 12/22/19 0728)   ipratropium-albuterol (DUO-NEB) 0 5-2 5 mg/3 mL inhalation solution 3 mL (3 mL Nebulization Given 12/22/19 0728)   PHENobarbital 65 mg/mL 240 mg in sodium chloride 0 9 % 100 mL IVPB (has no administration in time range)   PHENobarbital 65 mg/mL 180 mg in sodium chloride 0 9 % 100 mL IVPB (has no administration in time range)   PHENobarbital 65 mg/mL 180 mg in sodium chloride 0 9 % 100 mL IVPB (has no administration in time range)   sodium chloride 0 9 % bolus 1,000 mL (0 mL Intravenous Stopped 12/22/19 0237)   iohexol (OMNIPAQUE) 350 MG/ML injection (MULTI-DOSE) 100 mL (100 mL Intravenous Given 12/22/19 0231)   sodium chloride 0 9 % bolus 1,000 mL (0 mL Intravenous Stopped 12/22/19 0238)   sodium chloride 0 9 % bolus 1,000 mL (0 mL Intravenous Stopped 12/22/19 0334)   magnesium sulfate 2 g/50 mL IVPB (premix) 2 g (0 g Intravenous Stopped 12/22/19 0437)   sodium chloride 0 9 % bolus 1,000 mL (0 mL Intravenous Stopped 12/22/19 0445)   potassium chloride 20 mEq IVPB (premix) (0 mEq Intravenous Stopped 07/83/53 8256)   folic acid 1 mg in sodium chloride 0 9 % 50 mL IVPB (1 mg Intravenous New Bag 12/22/19 0731)   thiamine (VITAMIN B1) 100 mg in sodium chloride 0 9 % 50 mL IVPB (100 mg Intravenous New Bag 12/22/19 0729)   albumin human (FLEXBUMIN) 25 % injection 12 5 g (0 g Intravenous Stopped 12/22/19 0725)       Diagnostic Studies  Results Reviewed     Procedure Component Value Units Date/Time    CK (with reflex to MB) [[id number]]  (Normal) Collected:  12/22/19 0158    Lab Status:  Final result Specimen:  Blood from Arm, Right Updated:  12/22/19 0628     Total  U/L     Rapid drug screen, urine [[id number]]  (Abnormal) Collected:  12/22/19 0405    Lab Status:  Final result Specimen:  Urine, Clean Catch Updated:  12/22/19 0427     Amph/Meth UR Negative     Barbiturate Ur Negative     Benzodiazepine Urine Positive     Cocaine Urine Negative     Methadone Urine Negative     Opiate Urine Negative     PCP Ur Negative     THC Urine Negative    Narrative:       Presumptive report  If requested, specimen will be sent to reference lab for confirmation  FOR MEDICAL PURPOSES ONLY  IF CONFIRMATION NEEDED PLEASE CONTACT THE LAB WITHIN 5 DAYS  Drug Screen Cutoff Levels:  AMPHETAMINE/METHAMPHETAMINES  1000 ng/mL  BARBITURATES     200 ng/mL  BENZODIAZEPINES     200 ng/mL  COCAINE      300 ng/mL  METHADONE      300 ng/mL  OPIATES      300 ng/mL  PHENCYCLIDINE     25 ng/mL  THC       50 ng/mL      UA w Reflex to Microscopic w Reflex to Culture [[id number]] Collected:  12/22/19 0405    Lab Status:  Final result Specimen:  Urine, Clean Catch Updated:  12/22/19 0412     Color, UA Yellow     Clarity, UA Clear     Specific Gravity, UA <=1 005     pH, UA 5 5     Leukocytes, UA Negative     Nitrite, UA Negative     Protein, UA Negative mg/dl      Glucose, UA Negative mg/dl      Ketones, UA Negative mg/dl      Urobilinogen, UA 0 2 E U /dl      Bilirubin, UA Negative     Blood, UA Negative    Lactic acid, plasma [[id number]]  (Abnormal) Collected:  12/22/19 0240    Lab Status:  Final result Specimen:  Blood from Arm, Right Updated:  12/22/19 5891     LACTIC ACID 2 9 mmol/L     Narrative:       Result may be elevated if tourniquet was used during collection      Blood culture #2 [[id number]] Collected: 12/22/19 0205    Lab Status: In process Specimen:  Blood from Hand, Right Updated:  12/22/19 0246    Blood culture #1 [[id number]] Collected:  12/22/19 0200    Lab Status:   In process Specimen:  Blood from Arm, Right Updated:  12/22/19 0246    NT-BNP PRO [[id number]]  (Abnormal) Collected:  12/22/19 0158    Lab Status:  Final result Specimen:  Blood from Arm, Right Updated:  12/22/19 0242     NT-proBNP 481 pg/mL     Magnesium [[id number]]  (Abnormal) Collected:  12/22/19 0158    Lab Status:  Final result Specimen:  Blood from Arm, Right Updated:  12/22/19 0242     Magnesium 1 5 mg/dL     Troponin I [[id number]]  (Normal) Collected:  12/22/19 0215    Lab Status:  Final result Specimen:  Blood from Arm, Right Updated:  12/22/19 0239     Troponin I <0 02 ng/mL     CBC and differential [[id number]]  (Abnormal) Collected:  12/22/19 0158    Lab Status:  Final result Specimen:  Blood from Arm, Right Updated:  12/22/19 0230     WBC 9 95 Thousand/uL      RBC 4 33 Million/uL      Hemoglobin 14 4 g/dL      Hematocrit 42 1 %      MCV 97 fL      MCH 33 3 pg      MCHC 34 2 g/dL      RDW 12 6 %      MPV 11 8 fL      Platelets 72 Thousands/uL      nRBC 0 /100 WBCs     Protime-INR [[id number]]  (Normal) Collected:  12/22/19 0201    Lab Status:  Final result Specimen:  Blood from Arm, Right Updated:  12/22/19 0229     Protime 10 0 seconds      INR 0 93    APTT [[id number]]  (Normal) Collected:  12/22/19 0201    Lab Status:  Final result Specimen:  Blood from Arm, Right Updated:  12/22/19 0229     PTT 27 seconds     Comprehensive metabolic panel [[id number]]  (Abnormal) Collected:  12/22/19 0158    Lab Status:  Final result Specimen:  Blood from Arm, Right Updated:  12/22/19 0223     Sodium 132 mmol/L      Potassium 3 5 mmol/L      Chloride 96 mmol/L      CO2 25 mmol/L      ANION GAP 11 mmol/L      BUN 34 mg/dL      Creatinine 2 22 mg/dL      Glucose 106 mg/dL      Calcium 8 4 mg/dL      AST 32 U/L      ALT 41 U/L      Alkaline Phosphatase 82 U/L      Total Protein 7 7 g/dL      Albumin 3 6 g/dL      Total Bilirubin 0 40 mg/dL      eGFR 32 ml/min/1 73sq m     Narrative:       [place] guidelines for Chronic Kidney Disease (CKD):     Stage 1 with normal or high GFR (GFR > 90 mL/min/1 73 square meters)    Stage 2 Mild CKD (GFR = 60-89 mL/min/1 73 square meters)    Stage 3A Moderate CKD (GFR = 45-59 mL/min/1 73 square meters)    Stage 3B Moderate CKD (GFR = 30-44 mL/min/1 73 square meters)    Stage 4 Severe CKD (GFR = 15-29 mL/min/1 73 square meters)    Stage 5 End Stage CKD (GFR <15 mL/min/1 73 square meters)  Note: GFR calculation is accurate only with a steady state creatinine    Lipase [[id number]]  (Abnormal) Collected:  12/22/19 0158    Lab Status:  Final result Specimen:  Blood from Arm, Right Updated:  12/22/19 0223     Lipase 918 u/L     Ethanol [[id number]]  (Abnormal) Collected:  12/22/19 0158    Lab Status:  Final result Specimen:  Blood from Arm, Right Updated:  12/22/19 0223     Ethanol Lvl 289 mg/dL                  CT chest abdomen pelvis w contrast   Final Result by Jax Valladares MD (12/22 0310)      1  Subacute or chronic nondisplaced fractures of the right 8th and 9th ribs and left 8th rib  Otherwise, no acute findings in the chest, abdomen or pelvis  2   Colonic diverticulosis without evidence of acute diverticulitis  3   Hepatic steatosis  Workstation performed: [id number]         CT spine cervical without contrast   Final Result by Jax Valladares MD (12/22 0551)      No cervical spine fracture or traumatic malalignment  Workstation performed: [id number]         CT head without contrast   Final Result by Jax Valladares MD (12/22 1235)      No acute intracranial abnormality  Microangiopathic changes                    Workstation performed: [id number]                    Procedures  ECG 12 Lead Documentation Only  Date/Time: 12/22/2019 2:00 AM  Performed by: Gudelia Bustos DO  Authorized by: Gudelia Bustos DO     Indications / Diagnosis:  Hypotension  ECG reviewed by me, the ED Provider: yes    Patient location:  ED  Previous ECG:     Previous ECG:  Unavailable    Comparison to cardiac monitor: Yes    Interpretation:     Interpretation: normal    Rate:     ECG rate:  71bpm    ECG rate assessment: normal    Rhythm:     Rhythm: sinus rhythm    Ectopy:     Ectopy: none    QRS:     QRS axis:  Normal  Conduction:     Conduction: normal    ST segments:     ST segments:  Normal  T waves:     T waves: normal    CriticalCare Time  Performed by: Gudelia Bustos DO  Authorized by: Gudelia Bustos DO     Critical care provider statement:     Critical care time (minutes):  65    Critical care time was exclusive of:  Separately billable procedures and treating other patients and teaching time    Critical care was necessary to treat or prevent imminent or life-threatening deterioration of the following conditions:  Circulatory failure, dehydration, renal failure, shock and trauma    Critical care was time spent personally by me on the following activities:  Blood draw for specimens, obtaining history from patient or surrogate, development of treatment plan with patient or surrogate, discussions with consultants, evaluation of patient's response to treatment, examination of patient, interpretation of cardiac output measurements, ordering and performing treatments and interventions, ordering and review of laboratory studies, ordering and review of radiographic studies, re-evaluation of patient's condition, review of old charts and ventilator management    I assumed direction of critical care for this patient from another provider in my specialty: no               ED Course                               MDM  Number of Diagnoses or Management Options  Diagnosis management comments: Pt - acutely intoxicated presents after an unwitnessed fall, severely hypotensive  Labs and imaging reviewed  Pt is in ARF and in hypovolemic shock  Hypotension persists after 3L NSS  Pt started on Levophed  D/w Deena from critical care team         Amount and/or Complexity of Data Reviewed  Clinical lab tests: ordered and reviewed  Tests in the radiology section of CPT®: ordered and reviewed    Risk of Complications, Morbidity, and/or Mortality  Presenting problems: high  Diagnostic procedures: high  Management options: high    Patient Progress  Patient progress: improved        Disposition  Final diagnoses:   Hypovolemic shock ([facility] [address] )   Acute renal failure (ARF) ([facility] [address] )   Dehydration   Multiple rib fractures   [facility]     Time reflects when diagnosis was documented in both MDM as applicable and the Disposition within this note     Time User Action Codes Description Comment    12/22/2019  8:24 AM Lauren Radish O Add [R57 1] Hypovolemic shock ([facility] [address] )     12/22/2019  8:24 AM Lauren Radish O Add [N17 9] Acute renal failure ([facility]F) ([facility]     12/22/2019  8:24 AM Lauren Radish O Add [E86 0] Dehydration     12/22/2019  8:25 AM Lauren Radish O Add [S22 49XA] Multiple rib fractures     12/22/2019  8:25 AM Lauren Radish O Add [F10 929] Acute alcohol intoxication [facility])       ED Disposition     ED Disposition Condition Date/Time Comment    Admit Stable Sun Dec 22, 2019  4:00 AM Case was discussed with JEREMIE Shaffer and the patient's admission status was agreed to be Admission Status: inpatient status to the service of Dr Avery Harding          Follow-up Information    None         Current Discharge Medication List      CONTINUE these medications which have NOT CHANGED    Details   albuterol (PROVENTIL HFA,VENTOLIN HFA) 90 mcg/act inhaler Inhale 2 puffs every 4 (four) hours as needed for wheezing  Qty: 1 Inhaler, Refills: 0    Associated Diagnoses: COPD (chronic obstructive pulmonary disease) (HCC)      amLODIPine (NORVASC) 5 mg tablet Take 5 mg by mouth daily      Blood Glucose Monitoring Suppl (ONE TOUCH ULTRA MINI) w/Device KIT Use as directed  Refills: 0      clopidogrel (PLAVIX) 75 mg tablet Take 1 tablet (75 mg total) by mouth daily  Qty: 30 tablet, Refills: 0    Associated Diagnoses: CAD (coronary artery disease)      esomeprazole (NexIUM) 40 MG capsule Refills: 0      famotidine (PEPCID) 20 mg tablet Take 20 mg by mouth 2 (two) times a day      fluticasone-salmeterol (ADVAIR) 500-50 mcg/dose Inhale 1 puff every 12 (twelve) hours      folic acid (FOLVITE) 1 mg tablet Take 1 tablet (1 mg total) by mouth daily  Qty: 30 tablet, Refills: 0    Associated Diagnoses: Alcohol intoxication (HCC)      fosinopril (MONOPRIL) 10 mg tablet Take 10 mg by mouth daily   Refills: 0      magnesium oxide (MAG-OX) 400 mg Take 1 tablet (400 mg total) by mouth daily  Qty: 30 tablet, Refills: 0    Associated Diagnoses: Hypomagnesemia      metFORMIN (GLUCOPHAGE) 1000 MG tablet Take 1,000 mg by mouth 2 (two) times a day with meals      metoprolol succinate (TOPROL-XL) 100 mg 24 hr tablet Take 0 5 tablets (50 mg total) by mouth daily  Qty: 30 tablet, Refills: 3    Associated Diagnoses: CAD (coronary artery disease)      ONE TOUCH ULTRA TEST test strip 3 (three) times a day Test  Refills: 0      ONETOUCH DELICA LANCETS FINE MISC 3 (three) times a day Test  Refills: 0      ranolazine (RANEXA) 500 mg 12 hr tablet Take 1 tablet (500 mg total) by mouth 2 (two) times a day  Qty: 60 tablet, Refills: 0    Associated Diagnoses: CAD (coronary artery disease)      rosuvastatin (CRESTOR) 20 MG tablet Take 1 tablet (20 mg total) by mouth daily  Qty: 30 tablet, Refills: 0    Associated Diagnoses: HLD (hyperlipidemia)      sertraline (ZOLOFT) 100 mg tablet Take 100 mg by mouth daily       TAMSULOSIN HCL PO Take 0 4 mg by mouth daily      tiotropium (SPIRIVA) 18 mcg inhalation capsule Place 18 mcg into inhaler and inhale daily           No discharge procedures on file      ED Provider  Electronically Signed by Gudelia Bustos,   12/22/19 0027

## 2019-12-22 NOTE — H&P
Progress Note [facility] 1960, 64 y o  male MRN: [id number]    Unit/Bed#: ICU 04 Encounter: [id number]    Primary Care Provider: Ashlee Mohan MD   Date and time admitted to hospital: 12/22/2019  1:49 AM        * Shock [facility])  Assessment & Plan  Patient presents to the ED after reported fall at home  Per EMS, he was found outside of his home  He arrives hypotensive with SBP in the 70s and is intoxicated with medical alcohol 289  Patient had an admission in June of this year with similar presentation  Recent outpatient PCP visit had documented BP reading of 80/60  · Unclear cause of hypotension but likely secondary to hypovolemic shock in the setting of reported diarrhea x 1 week, daily alcohol use and poor PO intake   · Patient received 4L NSS in the ER and started on low dose levophed to maintain MAP >65  · Will continue hydration with Isolyte at 125mL an hour  · Wean levophed to off as able   · Hold home antihypertensives  · No leukocytosis or SIRs criteria other than hypotension to suggest septic shock  · Lactic 2 9, will continue to trend  · CT c/a/p unremarkable  · Lipase is 980 but pancreas/gallbladder are unremarkable on CT scan  Abdominal exam is benign   · Will send C diff and stool panel     Fall at home  Assessment & Plan  · Reported fall at home, patient is a poor historian and therefore unsure of events around fall  · Trauma scans negative for acute injuries  · Medical alcohol level 298 on admission  · Recent admission in September of this year secondary to left humeral fracture after fall  · Continue fall precautions  · PT/OT    Alcohol intoxication ([facility] [address] )  Assessment & Plan  Patient reports that he used to be a heavy drinker and primarily drank vodka until recently when he tried to cut back  This week he went several days without drinking as he was not feeling well but did report drinking several beers over the last few days   He states that he did feel withdrawal symptoms of chills, diaphoresis and diarrhea this week  · Medical alcohol level 298 on admission  · Will give thiamine/folate IV x 1  · Likely give phenobarb load today to prevent withdrawal symptoms  · UDS pending  · Continue to monitor neuro status  · Aspiration/seizure precautions    Diarrhea  Assessment & Plan  · Patient reports diarrhea for the past week  Reports going to the bathroom 6-8 times daily  · CT scan shows diverticulosis without evidence of acute diverticulitis  · abdominal exam is benign  · Digital rectal exam in the ER with brown stool but + for occult blood  · Diarrhea ? secondary to alcohol withdrawal as patient admits to recently cutting down on alcohol intake  · Will check C  Diff and enteric stool panel   · No bowel movement since admission, continue to monitor  · Continue IV hydration    Acute kidney injury superimposed on CKD [facility])  Assessment & Plan  ENMA on CKD present on admission  Creatinine 2 2  Baseline appears to be 0 9-1 0    · Prerenal secondary to dehydration vs ATN secondary to hypotension  · Repeat BMP after fluid resuscitation  · Will calculate FENA  · Creatinine has been as high as 3 on previous admissions  · Close I&O monitoring  · Daily weights  · Avoid nephrotoxins     COPD (chronic obstructive pulmonary disease) ([facility] [address] )  Assessment & Plan  · COPD not in acute exacerbation  · Reports smoking 1 5PPD  · Denies shortness of breath   · Not on continuous oxygen therapy   · Continue spiriva, advair (advair not on formulary, will give breo)    Thrombocytopenia ([facility] [address] )  Assessment & Plan  · Platelets 72 on admission  · ?  Secondary to chronic alcohol use  · Baseline platelets in the low 100s   · Continue to monitor closely     Hyponatremia  Assessment & Plan  · Mild hyponatremia on admission with sodium 132  · Patient received 4L NSS in the ER   · Will recheck after fluid resuscitation      CAD (coronary artery disease)  Assessment & Plan  · CAD s/p CABG in 2004, PCI in 2014   · Denies chest pain  · EKG without ST changes  · Troponin <0 02    Type 2 diabetes mellitus ([facility] )  Assessment & Plan  Lab Results   Component Value Date    HGBA1C 5 4 11/23/2019       No results for input(s): POCGLU in the last 72 hours  Blood Sugar Average: Last 72 hrs:     · Hold home metformin  · Continue SSI   · Recent A1c 5 4    Hypomagnesemia  Assessment & Plan  · Mag replacements given  · Continue to monitor and replace for goal >2    Medical non-compliance  Assessment & Plan  · Patient has multiple pill bottles with him on admission with some of them not having been filled since July of this year   · Patient reports med compliance but does not know what medications he takes and is a poor historian     Diastolic heart failure ([facility] )  Assessment & Plan  Wt Readings from Last 3 Encounters:   12/22/19 90 kg (198 lb 6 6 oz)   11/22/19 88 7 kg (195 lb 8 8 oz)   10/28/19 90 kg (198 lb 6 4 oz)       · Echo on 6/8/19 shows EF 50-55% with grade II diastolic dysfunction]  · BNP mildly elevated on admission  · CT scan without signs of pulmonary edema  · Monitor volume status closely after fluid resuscitation   · Close I&Os  · Daily weights    Essential (primary) hypertension  Assessment & Plan  · Hold home norvasc, metoprolol, fosinopril secondary to hypotension on admission        -------------------------------------------------------------------------------------------------------------  Chief Complaint: fall    History of Present Illness   HX and PE limited by: intoxication, poor historian     Emigdio Margaret is a 64 y o  male with past medical history of alcohol abuse, CAD s/p CABG in 2004 and PCI in 2014, CKD, COPD, GERD, hypertension, DMII, who presents after a reported fall at home  Per EMS, patient was found outside of his house  On arrival to the ER, patient was hypotensive with SBP in the 70s  Trauma scan negative for acute injuries but did show subacute right 8th and 9th and left 8th rib fractures   Laboratory workup notable for medical alcohol level 298, creatinine 2 2 (baseline 1 0-1 1), lactic acid of 2 9, sodium of 132  No leukocytosis, fever or other SIRs criteria present  Patient is alert and oriented x 3 but is a poor historian given current intoxication  He reports that he has been having diarrhea x 1 week  He states that he has been trying to cut down on his alcohol consumption but admits to drinking several beers over the last 2 days  He was given a total of 4L NSS with transient improvements in his blood pressure and was ultimately started on low dose levophed to maintain MAP >65  He will be admitted to the Step Down unit for further workup and monitoring  Of note per EMR review, patient has had multiple admissions secondary to intoxication and falls  Admission in June of this year, patient also presented hypotensive with SBP in the 60s  Outpatient note from 10/28 reports BP of 80/40  Patient reports compliance with prescribed medications and denies taking more than prescribed of antihypertensives  History obtained from chart review and the patient   -------------------------------------------------------------------------------------------------------------  Dispo: Transfer to Stepdown Level 1     Code Status: Level 1 - Full Code  --------------------------------------------------------------------------------------------------------------  Review of Systems   Constitutional: Positive for chills and diaphoresis  Respiratory: Negative for cough, chest tightness and shortness of breath  Cardiovascular: Negative for chest pain, palpitations and leg swelling  Gastrointestinal: Positive for abdominal pain, diarrhea and nausea  Negative for blood in stool, constipation and vomiting  Review of systems was unable to be performed secondary to intoxication    Physical Exam   Constitutional: He is oriented to person, place, and time  He appears well-developed and well-nourished  No distress     HENT:   Head: Normocephalic and atraumatic  Mouth/Throat: No oropharyngeal exudate  Eyes: Pupils are equal, round, and reactive to light  EOM are normal  No scleral icterus  Neck: Normal range of motion  Neck supple  No JVD present  Cardiovascular: Normal rate, regular rhythm, normal heart sounds and intact distal pulses  Exam reveals no friction rub  No murmur heard  Pulmonary/Chest: Effort normal  No accessory muscle usage  No respiratory distress  He has no rhonchi  He has no rales  Mild scattered expiratory wheezes   Abdominal: Soft  Bowel sounds are normal  He exhibits no distension  There is no tenderness  There is no guarding  Musculoskeletal: Normal range of motion  He exhibits no edema  Lymphadenopathy:     He has no cervical adenopathy  Neurological: He is alert and oriented to person, place, and time  No cranial nerve deficit  Oriented to self, place, month, year, president  Unsure of events prior to arrival to ER   Skin: Skin is warm  Capillary refill takes less than 2 seconds  He is not diaphoretic   No erythema      --------------------------------------------------------------------------------------------------------------  Historical Information   Past Medical History:   Diagnosis Date    Cancer [facility])     prostate ca,had radiation    Cardiac disease     stents,then triple bypass    COPD (chronic obstructive pulmonary disease) ([place] [address] )     Diabetes mellitus ([place] [address] )     ETOH abuse     Hx of heart artery stent     2014    Hyperlipidemia     Hypertension     Prostate CA ([place] [address] )     Renal disorder     pyelonephritis    S/P CABG x 1     2004     Past Surgical History:   Procedure Laterality Date    CORONARY ARTERY BYPASS GRAFT  2004    TONSILLECTOMY       Social History   Social History     Substance and Sexual Activity   Alcohol Use Yes    Frequency: 4 or more times a week    Drinks per session: 10 or more    Binge frequency: Daily or almost daily    Comment: 1/5 of vodka per day Social History     Substance and Sexual Activity   Drug Use No     Social History     Tobacco Use   Smoking Status Current Every Day Smoker    Packs/day: 1 50    Years: 40 00    Pack years: 60 00   Smokeless Tobacco Never Used     Exercise History: unknown   Family History:   Family History   Problem Relation Age of Onset    Diabetes Mother     Uterine cancer Mother     COPD Father     Hypertension Father      Family history unknown and I have reviewed this patient's family history and commented on sigificant items within the HPI    Vitals:   Vitals:    12/22/19 0430 12/22/19 0445 12/22/19 0450 12/22/19 0500   BP: 101/58 107/62  107/63   BP Location:       Pulse: 71 70  71   Resp: 21 (!) 29  21   Temp:       TempSrc:       SpO2: 99% 98%  98%   Weight:   90 kg (198 lb 6 6 oz)    Height:   6' 2" (1 88 m)      Temp  Min: 97 3 °F (36 3 °C)  Max: 97 3 °F (36 3 °C)  IBW: 82 2 kg  Height: 6' 2" (188 cm)  Body mass index is 25 47 kg/m²        Medications:  Current Facility-Administered Medications   Medication Dose Route Frequency    atorvastatin (LIPITOR) tablet 40 mg  40 mg Oral Daily With Dinner    famotidine (PEPCID) tablet 20 mg  20 mg Oral BID    fluticasone-vilanterol (BREO ELLIPTA) 200-25 MCG/INH inhaler 1 puff  1 puff Inhalation Daily    [START ON 46/76/5791] folic acid (FOLVITE) tablet 1 mg  1 mg Oral Daily    folic acid 1 mg in sodium chloride 0 9 % 50 mL IVPB  1 mg Intravenous Once    heparin (porcine) subcutaneous injection 5,000 Units  5,000 Units Subcutaneous Q8H [address]    insulin lispro (HumaLOG) 100 units/mL subcutaneous injection 1-5 Units  1-5 Units Subcutaneous TID AC    insulin lispro (HumaLOG) 100 units/mL subcutaneous injection 1-5 Units  1-5 Units Subcutaneous HS    multi-electrolyte (PLASMALYTE-A/ISOLYTE-S PH 7 4) IV solution  125 mL/hr Intravenous Continuous    norepinephrine (LEVOPHED) 4 mg (STANDARD CONCENTRATION) IV in sodium chloride 0 9% 250 mL  10 mcg/min Intravenous Titrated  potassium chloride 20 mEq IVPB (premix)  20 mEq Intravenous Once    tamsulosin (FLOMAX) capsule 0 4 mg  0 4 mg Oral Daily    thiamine (VITAMIN B1) 100 mg in sodium chloride 0 9 % 50 mL IVPB  100 mg Intravenous Once    tiotropium (SPIRIVA) capsule for inhaler 18 mcg  18 mcg Inhalation Daily     Home medications:  Prior to Admission Medications   Prescriptions Last Dose Informant Patient Reported? Taking?    Blood Glucose Monitoring Suppl (ONE TOUCH ULTRA MINI) w/Device KIT   Yes No   Sig: Use as directed   ONE TOUCH ULTRA TEST test strip   Yes No   Sig: 3 (three) times a day Test   [facility] LANCETS FINE MISC   Yes No   Sig: 3 (three) times a day Test   TAMSULOSIN HCL PO  Self Yes No   Sig: Take 0 4 mg by mouth daily   albuterol (PROVENTIL HFA,VENTOLIN HFA) 90 mcg/act inhaler   No No   Sig: Inhale 2 puffs every 4 (four) hours as needed for wheezing   amLODIPine (NORVASC) 5 mg tablet   Yes No   Sig: Take 5 mg by mouth daily   clopidogrel (PLAVIX) 75 mg tablet   No No   Sig: Take 1 tablet (75 mg total) by mouth daily   esomeprazole (NexIUM) 40 MG capsule   Yes No   famotidine (PEPCID) 20 mg tablet   Yes No   Sig: Take 20 mg by mouth 2 (two) times a day   fluticasone-salmeterol (ADVAIR) 500-50 mcg/dose   Yes No   Sig: Inhale 1 puff every 12 (twelve) hours   folic acid (FOLVITE) 1 mg tablet   No No   Sig: Take 1 tablet (1 mg total) by mouth daily   Patient not taking: Reported on 11/22/2019   fosinopril (MONOPRIL) 10 mg tablet   Yes No   Sig: Take 10 mg by mouth daily    magnesium oxide (MAG-OX) 400 mg   No No   Sig: Take 1 tablet (400 mg total) by mouth daily   Patient not taking: Reported on 11/22/2019   metFORMIN (GLUCOPHAGE) 1000 MG tablet   Yes No   Sig: Take 1,000 mg by mouth 2 (two) times a day with meals   metoprolol succinate (TOPROL-XL) 100 mg 24 hr tablet   No No   Sig: Take 0 5 tablets (50 mg total) by mouth daily   ranolazine (RANEXA) 500 mg 12 hr tablet   No No   Sig: Take 1 tablet (500 mg total) by mouth 2 (two) times a day   rosuvastatin (CRESTOR) 20 MG tablet   No No   Sig: Take 1 tablet (20 mg total) by mouth daily   sertraline (ZOLOFT) 100 mg tablet   Yes No   Sig: Take 100 mg by mouth daily    tiotropium (SPIRIVA) 18 mcg inhalation capsule  Self Yes No   Sig: Place 18 mcg into inhaler and inhale daily      Facility-Administered Medications: None     Allergies:  No Known Allergies      Laboratory and Diagnostics:  Results from last 7 days   Lab Units 12/22/19  0158   WBC Thousand/uL 9 95   HEMOGLOBIN g/dL 14 4   HEMATOCRIT % 42 1   PLATELETS Thousands/uL 72*   MONO PCT % 13*     Results from last 7 days   Lab Units 12/22/19  0158   SODIUM mmol/L 132*   POTASSIUM mmol/L 3 5   CHLORIDE mmol/L 96*   CO2 mmol/L 25   ANION GAP mmol/L 11   BUN mg/dL 34*   CREATININE mg/dL 2 22*   CALCIUM mg/dL 8 4   GLUCOSE RANDOM mg/dL 106   ALT U/L 41   AST U/L 32   ALK PHOS U/L 82   ALBUMIN g/dL 3 6   TOTAL BILIRUBIN mg/dL 0 40     Results from last 7 days   Lab Units 12/22/19  0158   MAGNESIUM mg/dL 1 5*      Results from last 7 days   Lab Units 12/22/19  0201   INR  0 93   PTT seconds 27      Results from last 7 days   Lab Units 12/22/19  0215   TROPONIN I ng/mL <0 02     Results from last 7 days   Lab Units 12/22/19  0240   LACTIC ACID mmol/L 2 9*     ABG:    VBG:          Micro:          EKG: normal sinus rhythm on telemetry   Imaging: I have personally reviewed pertinent reports  and I have personally reviewed pertinent films in PACS    ------------------------------------------------------------------------------------------------------------  Advance Directive and Living Will:      Power of :    POLST:    ------------------------------------------------------------------------------------------------------------  Anticipated Length of Stay is > 2 midnights    Counseling / Coordination of Care  Total time spent today 42 minutes   Greater than 50% of total time was spent with the patient and / or family counseling and / or coordination of care  CHELSEA Sims        Portions of the record may have been created with voice recognition software  Occasional wrong word or "sound a like" substitutions may have occurred due to the inherent limitations of voice recognition software    Read the chart carefully and recognize, using context, where substitutions have occurred

## 2019-12-22 NOTE — PLAN OF CARE
Problem: Potential for Falls  Goal: Patient will remain free of falls  Description  INTERVENTIONS:  - Assess patient frequently for physical needs  -  Identify cognitive and physical deficits and behaviors that affect risk of falls    -  [employer] fall precautions as indicated by assessment   - Educate patient/family on patient safety including physical limitations  - Instruct patient to call for assistance with activity based on assessment  - Modify environment to reduce risk of injury  - Consider OT/PT consult to assist with strengthening/mobility  Outcome: Progressing     Problem: METABOLIC, FLUID AND ELECTROLYTES - ADULT  Goal: Electrolytes maintained within normal limits  Description  INTERVENTIONS:  - Monitor labs and assess patient for signs and symptoms of electrolyte imbalances  - Administer electrolyte replacement as ordered  - Monitor response to electrolyte replacements, including repeat lab results as appropriate  - Instruct patient on fluid and nutrition as appropriate  Outcome: Progressing  Goal: Fluid balance maintained  Description  INTERVENTIONS:  - Monitor labs   - Monitor I/O and WT  - Instruct patient on fluid and nutrition as appropriate  - Assess for signs & symptoms of volume excess or deficit  Outcome: Progressing     Problem: CARDIOVASCULAR - ADULT  Goal: Maintains optimal cardiac output and hemodynamic stability  Description  INTERVENTIONS:  - Monitor I/O, vital signs and rhythm  - Monitor for S/S and trends of decreased cardiac output  - Administer and titrate ordered vasoactive medications to optimize hemodynamic stability  - Assess quality of pulses, skin color and temperature  - Assess for signs of decreased coronary artery perfusion  - Instruct patient to report change in severity of symptoms  Outcome: Progressing  Goal: Absence of cardiac dysrhythmias or at baseline rhythm  Description  INTERVENTIONS:  - Continuous cardiac monitoring, vital signs, obtain 12 lead EKG if ordered  - Administer antiarrhythmic and heart rate control medications as ordered  - Monitor electrolytes and administer replacement therapy as ordered  Outcome: Progressing     Problem: SAFETY ADULT  Goal: Patient will remain free of falls  Description  INTERVENTIONS:  - Assess patient frequently for physical needs  -  Identify cognitive and physical deficits and behaviors that affect risk of falls    -  [place] fall precautions as indicated by assessment   - Educate patient/family on patient safety including physical limitations  - Instruct patient to call for assistance with activity based on assessment  - Modify environment to reduce risk of injury  - Consider OT/PT consult to assist with strengthening/mobility  Outcome: Progressing  Goal: Maintain or return to baseline ADL function  Description  INTERVENTIONS:  -  Assess patient's ability to carry out ADLs; assess patient's baseline for ADL function and identify physical deficits which impact ability to perform ADLs (bathing, care of mouth/teeth, toileting, grooming, dressing, etc )  - Assess/evaluate cause of self-care deficits   - Assess range of motion  - Assess patient's mobility; develop plan if impaired  - Assess patient's need for assistive devices and provide as appropriate  - Encourage maximum independence but intervene and supervise when necessary  - Involve family in performance of ADLs  - Assess for home care needs following discharge   - Consider OT consult to assist with ADL evaluation and planning for discharge  - Provide patient education as appropriate  Outcome: Progressing  Goal: Maintain or return mobility status to optimal level  Description  INTERVENTIONS:  - Assess patient's baseline mobility status (ambulation, transfers, stairs, etc )    - Identify cognitive and physical deficits and behaviors that affect mobility  - Identify mobility aids required to assist with transfers and/or ambulation (gait belt, sit-to-stand, lift, walker, cane, etc )  - [place] fall precautions as indicated by assessment  - Record patient progress and toleration of activity level on Mobility SBAR; progress patient to next Phase/Stage  - Instruct patient to call for assistance with activity based on assessment  - Consider rehabilitation consult to assist with strengthening/weightbearing, etc   Outcome: Progressing     Problem: Knowledge Deficit  Goal: Patient/family/caregiver demonstrates understanding of disease process, treatment plan, medications, and discharge instructions  Description  Complete learning assessment and assess knowledge base    Interventions:  - Provide teaching at level of understanding  - Provide teaching via preferred learning methods  Outcome: Progressing

## 2019-12-22 NOTE — ASSESSMENT & PLAN NOTE
ENMA on CKD present on admission  Creatinine 2 2   Baseline appears to be 0 9-1 0    · Prerenal secondary to dehydration vs ATN secondary to hypotension  · Repeat BMP after fluid resuscitation  · Will calculate FENA  · Creatinine has been as high as 3 on previous admissions  · Close I&O monitoring  · Daily weights  · Avoid nephrotoxins

## 2019-12-22 NOTE — ASSESSMENT & PLAN NOTE
· Platelets 72 on admission  · ?  Secondary to chronic alcohol use  · Baseline platelets in the low 100s   · Continue to monitor closely

## 2019-12-22 NOTE — ASSESSMENT & PLAN NOTE
Patient reports that he used to be a heavy drinker and primarily drank vodka until recently when he tried to cut back  This week he went several days without drinking as he was not feeling well but did report drinking several beers over the last few days  He states that he did feel withdrawal symptoms of chills, diaphoresis and diarrhea this week     · Medical alcohol level 298 on admission  · Will give thiamine/folate IV x 1  · Likely give phenobarb load today to prevent withdrawal symptoms  · UDS pending  · Continue to monitor neuro status  · Aspiration/seizure precautions

## 2019-12-22 NOTE — ASSESSMENT & PLAN NOTE
· Patient reports diarrhea for the past week  Reports going to the bathroom 6-8 times daily  · CT scan shows diverticulosis without evidence of acute diverticulitis  · abdominal exam is benign  · Digital rectal exam in the ER with brown stool but + for occult blood  · Diarrhea ? secondary to alcohol withdrawal as patient admits to recently cutting down on alcohol intake  · Will check C   Diff and enteric stool panel   · No bowel movement since admission, continue to monitor  · Continue IV hydration

## 2019-12-22 NOTE — ASSESSMENT & PLAN NOTE
· COPD not in acute exacerbation  · Reports smoking 1 5PPD  · Denies shortness of breath   · Not on continuous oxygen therapy   · Continue spiriva, advair (advair not on formulary, will give breo)

## 2019-12-22 NOTE — ASSESSMENT & PLAN NOTE
Wt Readings from Last 3 Encounters:   12/22/19 90 kg (198 lb 6 6 oz)   11/22/19 88 7 kg (195 lb 8 8 oz)   10/28/19 90 kg (198 lb 6 4 oz)       · Echo on 6/8/19 shows EF 50-55% with grade II diastolic dysfunction]  · BNP mildly elevated on admission  · CT scan without signs of pulmonary edema  · Monitor volume status closely after fluid resuscitation   · Close I&Os  · Daily weights

## 2019-12-22 NOTE — ASSESSMENT & PLAN NOTE
Lab Results   Component Value Date    HGBA1C 5 4 11/23/2019       No results for input(s): POCGLU in the last 72 hours      Blood Sugar Average: Last 72 hrs:     · Hold home metformin  · Continue SSI   · Recent A1c 5 4

## 2019-12-22 NOTE — ASSESSMENT & PLAN NOTE
· CAD s/p CABG in 2004, PCI in 2014   · Denies chest pain  · EKG without ST changes  · Troponin <0 02

## 2019-12-22 NOTE — ASSESSMENT & PLAN NOTE
· Patient has multiple pill bottles with him on admission with some of them not having been filled since July of this year   · Patient reports med compliance but does not know what medications he takes and is a poor historian

## 2019-12-22 NOTE — H&P
H&P erroneously filed as progress report   Please use progress note by Portia De La Torre from 12/22/19 at 5:53 am and accompanying attestation as H&P

## 2019-12-22 NOTE — ASSESSMENT & PLAN NOTE
· Mild hyponatremia on admission with sodium 132  · Patient received 4L NSS in the ER   · Will recheck after fluid resuscitation

## 2019-12-22 NOTE — ASSESSMENT & PLAN NOTE
· Reported fall at home, patient is a poor historian and therefore unsure of events around fall  · Trauma scans negative for acute injuries  · Medical alcohol level 298 on admission  · Recent admission in September of this year secondary to left humeral fracture after fall  · Continue fall precautions  · PT/OT

## 2019-12-23 ENCOUNTER — APPOINTMENT (INPATIENT)
Dept: NON INVASIVE DIAGNOSTICS | Facility: HOSPITAL | Age: 59
DRG: 896 | End: 2019-12-23
Payer: MEDICARE

## 2019-12-23 PROBLEM — R57.9 SHOCK (HCC): Status: RESOLVED | Noted: 2019-12-22 | Resolved: 2019-12-23

## 2019-12-23 PROBLEM — E86.0 DEHYDRATION: Status: ACTIVE | Noted: 2019-12-23

## 2019-12-23 PROBLEM — D69.6 THROMBOCYTOPENIA (HCC): Chronic | Status: ACTIVE | Noted: 2019-06-09

## 2019-12-23 LAB
ALBUMIN SERPL BCP-MCNC: 3 G/DL (ref 3.5–5)
ALP SERPL-CCNC: 88 U/L (ref 46–116)
ALT SERPL W P-5'-P-CCNC: 56 U/L (ref 12–78)
ANION GAP SERPL CALCULATED.3IONS-SCNC: 9 MMOL/L (ref 4–13)
AST SERPL W P-5'-P-CCNC: 62 U/L (ref 5–45)
BASOPHILS # BLD AUTO: 0.05 THOUSANDS/ΜL (ref 0–0.1)
BASOPHILS NFR BLD AUTO: 1 % (ref 0–1)
BILIRUB SERPL-MCNC: 0.9 MG/DL (ref 0.2–1)
BUN SERPL-MCNC: 17 MG/DL (ref 5–25)
CALCIUM SERPL-MCNC: 7.7 MG/DL (ref 8.3–10.1)
CHLORIDE SERPL-SCNC: 102 MMOL/L (ref 100–108)
CO2 SERPL-SCNC: 27 MMOL/L (ref 21–32)
CREAT SERPL-MCNC: 0.99 MG/DL (ref 0.6–1.3)
EOSINOPHIL # BLD AUTO: 0.12 THOUSAND/ΜL (ref 0–0.61)
EOSINOPHIL NFR BLD AUTO: 2 % (ref 0–6)
ERYTHROCYTE [DISTWIDTH] IN BLOOD BY AUTOMATED COUNT: 12.7 % (ref 11.6–15.1)
GFR SERPL CREATININE-BSD FRML MDRD: 84 ML/MIN/1.73SQ M
GLUCOSE SERPL-MCNC: 111 MG/DL (ref 65–140)
GLUCOSE SERPL-MCNC: 112 MG/DL (ref 65–140)
GLUCOSE SERPL-MCNC: 129 MG/DL (ref 65–140)
GLUCOSE SERPL-MCNC: 80 MG/DL (ref 65–140)
GLUCOSE SERPL-MCNC: 87 MG/DL (ref 65–140)
GLUCOSE SERPL-MCNC: 89 MG/DL (ref 65–140)
HCT VFR BLD AUTO: 37.7 % (ref 36.5–49.3)
HGB BLD-MCNC: 12.4 G/DL (ref 12–17)
IMM GRANULOCYTES # BLD AUTO: 0.02 THOUSAND/UL (ref 0–0.2)
IMM GRANULOCYTES NFR BLD AUTO: 0 % (ref 0–2)
LYMPHOCYTES # BLD AUTO: 1.16 THOUSANDS/ΜL (ref 0.6–4.47)
LYMPHOCYTES NFR BLD AUTO: 22 % (ref 14–44)
MAGNESIUM SERPL-MCNC: 1.4 MG/DL (ref 1.6–2.6)
MCH RBC QN AUTO: 34 PG (ref 26.8–34.3)
MCHC RBC AUTO-ENTMCNC: 32.9 G/DL (ref 31.4–37.4)
MCV RBC AUTO: 103 FL (ref 82–98)
MONOCYTES # BLD AUTO: 0.77 THOUSAND/ΜL (ref 0.17–1.22)
MONOCYTES NFR BLD AUTO: 14 % (ref 4–12)
MRSA NOSE QL CULT: NORMAL
NEUTROPHILS # BLD AUTO: 3.22 THOUSANDS/ΜL (ref 1.85–7.62)
NEUTS SEG NFR BLD AUTO: 61 % (ref 43–75)
NRBC BLD AUTO-RTO: 0 /100 WBCS
PHOSPHATE SERPL-MCNC: 2.8 MG/DL (ref 2.7–4.5)
PLATELET # BLD AUTO: 61 THOUSANDS/UL (ref 149–390)
PMV BLD AUTO: 12.1 FL (ref 8.9–12.7)
POTASSIUM SERPL-SCNC: 3.9 MMOL/L (ref 3.5–5.3)
PROCALCITONIN SERPL-MCNC: <0.05 NG/ML
PROT SERPL-MCNC: 6.4 G/DL (ref 6.4–8.2)
RBC # BLD AUTO: 3.65 MILLION/UL (ref 3.88–5.62)
SODIUM SERPL-SCNC: 138 MMOL/L (ref 136–145)
WBC # BLD AUTO: 5.34 THOUSAND/UL (ref 4.31–10.16)

## 2019-12-23 PROCEDURE — 85025 COMPLETE CBC W/AUTO DIFF WBC: CPT | Performed by: PHYSICIAN ASSISTANT

## 2019-12-23 PROCEDURE — 87505 NFCT AGENT DETECTION GI: CPT | Performed by: NURSE PRACTITIONER

## 2019-12-23 PROCEDURE — G8979 MOBILITY GOAL STATUS: HCPCS

## 2019-12-23 PROCEDURE — 82948 REAGENT STRIP/BLOOD GLUCOSE: CPT

## 2019-12-23 PROCEDURE — 93306 TTE W/DOPPLER COMPLETE: CPT | Performed by: INTERNAL MEDICINE

## 2019-12-23 PROCEDURE — 87493 C DIFF AMPLIFIED PROBE: CPT | Performed by: NURSE PRACTITIONER

## 2019-12-23 PROCEDURE — 84100 ASSAY OF PHOSPHORUS: CPT | Performed by: PHYSICIAN ASSISTANT

## 2019-12-23 PROCEDURE — 99233 SBSQ HOSP IP/OBS HIGH 50: CPT | Performed by: INTERNAL MEDICINE

## 2019-12-23 PROCEDURE — 94760 N-INVAS EAR/PLS OXIMETRY 1: CPT

## 2019-12-23 PROCEDURE — 94640 AIRWAY INHALATION TREATMENT: CPT

## 2019-12-23 PROCEDURE — G8978 MOBILITY CURRENT STATUS: HCPCS

## 2019-12-23 PROCEDURE — 80053 COMPREHEN METABOLIC PANEL: CPT | Performed by: PHYSICIAN ASSISTANT

## 2019-12-23 PROCEDURE — 93306 TTE W/DOPPLER COMPLETE: CPT

## 2019-12-23 PROCEDURE — 84145 PROCALCITONIN (PCT): CPT | Performed by: NURSE PRACTITIONER

## 2019-12-23 PROCEDURE — 97163 PT EVAL HIGH COMPLEX 45 MIN: CPT

## 2019-12-23 PROCEDURE — 83735 ASSAY OF MAGNESIUM: CPT | Performed by: PHYSICIAN ASSISTANT

## 2019-12-23 RX ORDER — LORAZEPAM 1 MG/1
2 TABLET ORAL ONCE
Status: COMPLETED | OUTPATIENT
Start: 2019-12-23 | End: 2019-12-23

## 2019-12-23 RX ORDER — CLOPIDOGREL BISULFATE 75 MG/1
75 TABLET ORAL DAILY
Status: DISCONTINUED | OUTPATIENT
Start: 2019-12-23 | End: 2019-12-23

## 2019-12-23 RX ORDER — IPRATROPIUM BROMIDE AND ALBUTEROL SULFATE 2.5; .5 MG/3ML; MG/3ML
3 SOLUTION RESPIRATORY (INHALATION)
Status: DISCONTINUED | OUTPATIENT
Start: 2019-12-23 | End: 2019-12-25 | Stop reason: HOSPADM

## 2019-12-23 RX ORDER — AMLODIPINE BESYLATE 5 MG/1
5 TABLET ORAL DAILY
Status: DISCONTINUED | OUTPATIENT
Start: 2019-12-23 | End: 2019-12-25 | Stop reason: HOSPADM

## 2019-12-23 RX ORDER — NICOTINE 21 MG/24HR
21 PATCH, TRANSDERMAL 24 HOURS TRANSDERMAL DAILY
Status: DISCONTINUED | OUTPATIENT
Start: 2019-12-24 | End: 2019-12-25 | Stop reason: HOSPADM

## 2019-12-23 RX ORDER — MAGNESIUM SULFATE HEPTAHYDRATE 40 MG/ML
4 INJECTION, SOLUTION INTRAVENOUS ONCE
Status: COMPLETED | OUTPATIENT
Start: 2019-12-23 | End: 2019-12-24

## 2019-12-23 RX ORDER — METOPROLOL SUCCINATE 50 MG/1
50 TABLET, EXTENDED RELEASE ORAL DAILY
Status: DISCONTINUED | OUTPATIENT
Start: 2019-12-23 | End: 2019-12-25 | Stop reason: HOSPADM

## 2019-12-23 RX ORDER — SERTRALINE HYDROCHLORIDE 100 MG/1
100 TABLET, FILM COATED ORAL DAILY
Status: DISCONTINUED | OUTPATIENT
Start: 2019-12-23 | End: 2019-12-25 | Stop reason: HOSPADM

## 2019-12-23 RX ORDER — RANOLAZINE 500 MG/1
500 TABLET, EXTENDED RELEASE ORAL 2 TIMES DAILY
Status: DISCONTINUED | OUTPATIENT
Start: 2019-12-23 | End: 2019-12-25 | Stop reason: HOSPADM

## 2019-12-23 RX ORDER — PANTOPRAZOLE SODIUM 40 MG/1
40 TABLET, DELAYED RELEASE ORAL
Status: DISCONTINUED | OUTPATIENT
Start: 2019-12-23 | End: 2019-12-25 | Stop reason: HOSPADM

## 2019-12-23 RX ADMIN — AMLODIPINE BESYLATE 5 MG: 5 TABLET ORAL at 08:54

## 2019-12-23 RX ADMIN — SODIUM CHLORIDE, SODIUM GLUCONATE, SODIUM ACETATE, POTASSIUM CHLORIDE AND MAGNESIUM CHLORIDE 150 ML/HR: 526; 502; 368; 37; 30 INJECTION, SOLUTION INTRAVENOUS at 06:15

## 2019-12-23 RX ADMIN — METOPROLOL SUCCINATE 50 MG: 50 TABLET, EXTENDED RELEASE ORAL at 08:54

## 2019-12-23 RX ADMIN — PANTOPRAZOLE SODIUM 40 MG: 40 TABLET, DELAYED RELEASE ORAL at 08:54

## 2019-12-23 RX ADMIN — FOLIC ACID 1 MG: 1 TABLET ORAL at 08:54

## 2019-12-23 RX ADMIN — Medication 100 MG: at 08:54

## 2019-12-23 RX ADMIN — SERTRALINE HYDROCHLORIDE 100 MG: 100 TABLET ORAL at 08:54

## 2019-12-23 RX ADMIN — IPRATROPIUM BROMIDE AND ALBUTEROL SULFATE 3 ML: 2.5; .5 SOLUTION RESPIRATORY (INHALATION) at 08:04

## 2019-12-23 RX ADMIN — LORAZEPAM 2 MG: 1 TABLET ORAL at 21:30

## 2019-12-23 RX ADMIN — IPRATROPIUM BROMIDE AND ALBUTEROL SULFATE 3 ML: 2.5; .5 SOLUTION RESPIRATORY (INHALATION) at 19:18

## 2019-12-23 RX ADMIN — HEPARIN SODIUM 5000 UNITS: 5000 INJECTION INTRAVENOUS; SUBCUTANEOUS at 14:40

## 2019-12-23 RX ADMIN — MAGNESIUM SULFATE HEPTAHYDRATE 4 G: 40 INJECTION, SOLUTION INTRAVENOUS at 07:31

## 2019-12-23 RX ADMIN — HEPARIN SODIUM 5000 UNITS: 5000 INJECTION INTRAVENOUS; SUBCUTANEOUS at 21:04

## 2019-12-23 RX ADMIN — Medication 1 TABLET: at 08:54

## 2019-12-23 RX ADMIN — FLUTICASONE FUROATE AND VILANTEROL TRIFENATATE 1 PUFF: 200; 25 POWDER RESPIRATORY (INHALATION) at 09:53

## 2019-12-23 RX ADMIN — RANOLAZINE 500 MG: 500 TABLET, FILM COATED, EXTENDED RELEASE ORAL at 08:54

## 2019-12-23 RX ADMIN — TAMSULOSIN HYDROCHLORIDE 0.4 MG: 0.4 CAPSULE ORAL at 08:54

## 2019-12-23 RX ADMIN — RANOLAZINE 500 MG: 500 TABLET, FILM COATED, EXTENDED RELEASE ORAL at 21:04

## 2019-12-23 RX ADMIN — CLOPIDOGREL BISULFATE 75 MG: 75 TABLET ORAL at 08:54

## 2019-12-23 RX ADMIN — HEPARIN SODIUM 5000 UNITS: 5000 INJECTION INTRAVENOUS; SUBCUTANEOUS at 06:31

## 2019-12-23 RX ADMIN — ATORVASTATIN CALCIUM 40 MG: 40 TABLET, FILM COATED ORAL at 16:21

## 2019-12-23 NOTE — ASSESSMENT & PLAN NOTE
· COPD not in acute exacerbation  · Reports smoking 1 5 PPD  · Not on continuous oxygen therapy   · Continue spiriva, advair (advair not on formulary, will give breo)

## 2019-12-23 NOTE — ASSESSMENT & PLAN NOTE
· Hold home Norvasc, Metoprolol, Fosinopril secondary to hypotension on admission  · Restart as BP tolerates

## 2019-12-23 NOTE — ASSESSMENT & PLAN NOTE
· Patient reports diarrhea for the past week  Reports going to the bathroom 6-8 times daily  · CT scan shows diverticulosis without evidence of acute diverticulitis  · Digital rectal exam in the ER with brown stool but + for occult blood  · Diarrhea ? secondary to alcohol withdrawal as patient admits to recently cutting down on alcohol intake  · Will check C   Diff and enteric stool panel, still pending to be sent  · No bowel movement since admission, continue to monitor  · Continue IV hydration

## 2019-12-23 NOTE — ASSESSMENT & PLAN NOTE
· ENMA on CKD present on admission  Creatinine 2 2  Baseline appears to be 0 9-1 0   Down to 0 99 this am  · Prerenal secondary to dehydration vs ATN secondary to hypotension  · Creatinine has been as high as 3 on previous admissions  · Close I&O monitoring  · Daily weights  · Avoid nephrotoxins

## 2019-12-23 NOTE — ASSESSMENT & PLAN NOTE
· Mild hyponatremia on admission with sodium 132  · Patient received 4L NSS in the ER   · This am up to 138

## 2019-12-23 NOTE — ASSESSMENT & PLAN NOTE
· Platelets 72 on admission, 61 today  · ?  Secondary to chronic alcohol use  · Baseline platelets in the low 100s   · Continue to monitor closely

## 2019-12-23 NOTE — CONSULTS
[facility] TRANSFER Note - Minna Mendenhall 64 y o  male MRN: [id number]    Unit/Bed#: [facility]an Encounter: [id number]      Assessment/Plan:  Patient is stable from the ICU, will be transferred to medical floor when bed becomes available today  * Dehydration  · 2/2 diarrhea, not feeling well, and alcohol use  · IVF discontinued as patient is tolerating p o  Diet  · Continue monitoring vitals, and reassessing fluid status      [facility]  · Admits to alcohol abuse with vodka, but recently has cut back  Was drinking beers this week, but stopped that due to feeling ill with diarrhea  · Has felt withdrawal symptoms of chills, diaphoresis and diarrhea this week  · EtOH 298 POA  · Cont thiamine/ folate/ MVI  · Phenobarb loaded to prevent withdrawal symptoms, only mild tremor noted otherwise CIWA stable  · Will continue CIWA  · Aspiration/seizure precautions     Acute kidney injury superimposed on CKD ([facility] ) - ENMA resolved   · ENMA on CKD POA: Cr 2 2  Baseline appears to be 0 9-1 0  Improved to 0 99 this am  · Prerenal 2/2 dehydration vs ATN 2/2 hypotension  · Creatinine has been as high as 3 on previous admissions  · Close I&O monitoring, & Daily weights  · Avoid nephrotoxins      Diarrhea  · Pt reports diarrhea for the past week, but unable to quantify  · CT scan shows diverticulosis without evidence of acute diverticulitis  · NELLIE in the ER with brown stool but + for occult blood  · Diarrhea possibly 2/2 alcohol withdrawal as pt admits to cutting back significantly  · C   Diff and enteric stool panel - pending  · No BM since admission, continue to monitor  · Continue IV hydration      Thrombocytopenia (HCC)  · Platelets 72 on admission, 61 today  · Possibly secondary to alcoholism  · Baseline platelets on chart review in the 100s  · Continue to monitor closely      Hypomagnesemia   · Mg today 1 4 - repleted by ICU  · Reassess tomorrow and replace as needed for goal >2     COPD (chronic obstructive pulmonary disease) (HCC)  · COPD Stable, not in acute exacerbation  · Reports smoking 1 5 PPD  · Not on home oxygen  · Continue home spiriva, and Advair substituted with Breo due to formulary     Diastolic heart failure ([facility] )      Wt Readings from Last 3 Encounters:   12/23/19 87 8 kg (193 lb 9 oz)   11/22/19 88 7 kg (195 lb 8 8 oz)   10/28/19 90 kg (198 lb 6 4 oz)      · Echo on 6/8/19 shows EF 50-55% with grade II diastolic dysfunction]  · BNP mildly elevated on admission  · CT scan without signs of pulmonary edema  · Monitor volume status closely after fluid resuscitation   · Close I&Os  · Daily weights     Medical non-compliance  · Pt reports med compliance, but is unaware of his medications and is a poor historian  · Complicated by alcoholism     CAD (coronary artery disease)  · Stable, CAD s/p CABG in 2004, PCI in 2014   · EKG without ST changes  · Troponin <0 02     Essential (primary) hypertension  · Hypotensive on admission while being treated for hypovolemic shock  · Improved today to normal values and then elevated SBPs 190s  · Restarted home Norvasc, Metoprolol  · Holding home ACEi     Type 2 diabetes mellitus ([facility] )  · Well controlled: glu   · Hold home metformin   · Continue ISS Algo 2 with accucheks achs  · Recent A1c 5 4    Shock (HCC)resolved as of 12/23/2019  ? Levo weaned off  ? Resolved    Hyponatremia - resolved  · Mild hyponatremia on admission with sodium 132  · Received 4L NS in the ED  · This am 138    Subjective:    Melia Obando is a 44-year-old male with a PMH of alcoholism, CKD, COPD, who presented to the ED for diarrhea, abdominal pain, and a fall where he hit his head 2/2 EtOH  He was admitted to the ICU due to blood pressures in the 60s- 70s/40s  In the ED he was found to have hypovolemic shock, acute renal failure, dehydration, and multiple rib fractures    He has been steadily improving since admission yesterday, and a has a normal temperature, a blood pressure of 166/88, & a pulse of 72  Initially he had a lactate of 2 9, which has now resolved  His alcohol level on admission was 298  He will not admit to how much he was drinking, but states he has significantly cut it back and is trying to quit  He was also hyponatremic, hypomagnesemic, and had acute renal failure with a creatinine at 2 2 and a baseline of 1 0  He was also found to be hypothyroid with a TSH of 0 277  He initially required pressors in the ICU, and fluid hydration  He is improving     Objective:   Vitals: Blood pressure 158/81, pulse 75, temperature 98 4 °F (36 9 °C), temperature source Temporal, resp  rate (!) 24, height 6' 2" (1 88 m), weight 87 8 kg (193 lb 9 oz), SpO2 93 %  ,Body mass index is 24 85 kg/m²    Wt Readings from Last 3 Encounters:   12/23/19 87 8 kg (193 lb 9 oz)   11/22/19 88 7 kg (195 lb 8 8 oz)   10/28/19 90 kg (198 lb 6 4 oz)       Intake/Output Summary (Last 24 hours) at 12/23/2019 1610  Last data filed at 12/23/2019 1201  Gross per 24 hour   Intake 2692 56 ml   Output 4175 ml   Net -1482 44 ml     Physical Exam: General appearance: alert and oriented, in no acute distress and cooperative  Head: Normocephalic, without obvious abnormality, atraumatic  Lungs: clear to auscultation bilaterally  Heart: regular rate and rhythm, S1, S2 normal, no murmur, click, rub or gallop  Abdomen: soft, non-tender; bowel sounds normal; no masses,  no organomegaly  Extremities: extremities normal, warm and well-perfused; no cyanosis, clubbing, or edema     Recent Results (from the past 24 hour(s))   Fingerstick Glucose (POCT)    Collection Time: 12/22/19  4:29 PM   Result Value Ref Range    POC Glucose 102 65 - 140 mg/dl   Fingerstick Glucose (POCT)    Collection Time: 12/22/19  9:08 PM   Result Value Ref Range    POC Glucose 82 65 - 140 mg/dl   CBC and differential    Collection Time: 12/23/19  5:47 AM   Result Value Ref Range    WBC 5 34 4 31 - 10 16 Thousand/uL    RBC 3 65 (L) 3 88 - 5 62 Million/uL    Hemoglobin 12 4 12 0 - 17 0 g/dL    Hematocrit 37 7 36 5 - 49 3 %     (H) 82 - 98 fL    MCH 34 0 26 8 - 34 3 pg    MCHC 32 9 31 4 - 37 4 g/dL    RDW 12 7 11 6 - 15 1 %    MPV 12 1 8 9 - 12 7 fL    Platelets 61 (L) 687 - 390 Thousands/uL    nRBC 0 /100 WBCs    Neutrophils Relative 61 43 - 75 %    Immat GRANS % 0 0 - 2 %    Lymphocytes Relative 22 14 - 44 %    Monocytes Relative 14 (H) 4 - 12 %    Eosinophils Relative 2 0 - 6 %    Basophils Relative 1 0 - 1 %    Neutrophils Absolute 3 22 1 85 - 7 62 Thousands/µL    Immature Grans Absolute 0 02 0 00 - 0 20 Thousand/uL    Lymphocytes Absolute 1 16 0 60 - 4 47 Thousands/µL    Monocytes Absolute 0 77 0 17 - 1 22 Thousand/µL    Eosinophils Absolute 0 12 0 00 - 0 61 Thousand/µL    Basophils Absolute 0 05 0 00 - 0 10 Thousands/µL   Comprehensive metabolic panel    Collection Time: 12/23/19  5:47 AM   Result Value Ref Range    Sodium 138 136 - 145 mmol/L    Potassium 3 9 3 5 - 5 3 mmol/L    Chloride 102 100 - 108 mmol/L    CO2 27 21 - 32 mmol/L    ANION GAP 9 4 - 13 mmol/L    BUN 17 5 - 25 mg/dL    Creatinine 0 99 0 60 - 1 30 mg/dL    Glucose 87 65 - 140 mg/dL    Calcium 7 7 (L) 8 3 - 10 1 mg/dL    AST 62 (H) 5 - 45 U/L    ALT 56 12 - 78 U/L    Alkaline Phosphatase 88 46 - 116 U/L    Total Protein 6 4 6 4 - 8 2 g/dL    Albumin 3 0 (L) 3 5 - 5 0 g/dL    Total Bilirubin 0 90 0 20 - 1 00 mg/dL    eGFR 84 ml/min/1 73sq m   Magnesium    Collection Time: 12/23/19  5:47 AM   Result Value Ref Range    Magnesium 1 4 (L) 1 6 - 2 6 mg/dL   Phosphorus    Collection Time: 12/23/19  5:47 AM   Result Value Ref Range    Phosphorus 2 8 2 7 - 4 5 mg/dL   Procalcitonin    Collection Time: 12/23/19  5:47 AM   Result Value Ref Range    Procalcitonin <0 05 <=0 25 ng/ml   Fingerstick Glucose (POCT)    Collection Time: 12/23/19  6:42 AM   Result Value Ref Range    POC Glucose 80 65 - 140 mg/dl   Fingerstick Glucose (POCT)    Collection Time: 12/23/19  7:59 AM   Result Value Ref Range    POC Glucose 89 65 - 140 mg/dl   Fingerstick Glucose (POCT)    Collection Time: 12/23/19 11:31 AM   Result Value Ref Range    POC Glucose 129 65 - 140 mg/dl       Current Facility-Administered Medications   Medication Dose Route Frequency Provider Last Rate Last Dose    [MAR Hold] amLODIPine (NORVASC) tablet 5 mg  5 mg Oral Daily TANK Haywood   5 mg at 12/23/19 0854    [MAR Hold] atorvastatin (LIPITOR) tablet 40 mg  40 mg Oral Daily With CHELSEA Wiseman   40 mg at 12/22/19 1704    [MAR Hold] clopidogrel (PLAVIX) tablet 75 mg  75 mg Oral Daily Corin Lawrence PA-C   75 mg at 12/23/19 0854    [MAR Hold] fluticasone-vilanterol (BREO ELLIPTA) 200-25 MCG/INH inhaler 1 puff  1 puff Inhalation Daily CHELSEA Ny   1 puff at 12/23/19 0953    [MAR Hold] folic acid (FOLVITE) tablet 1 mg  1 mg Oral Daily CHELSEA Ny   1 mg at 12/23/19 0854    [MAR Hold] heparin (porcine) subcutaneous injection 5,000 Units  5,000 Units Subcutaneous [facility] CHELSEA Ny   5,000 Units at 12/23/19 1440    [MAR Hold] insulin lispro (HumaLOG) 100 units/mL subcutaneous injection 1-5 Units  1-5 Units Subcutaneous TID  CHELSEA Enriquez        [facility] Hold] insulin lispro (HumaLOG) 100 units/mL subcutaneous injection 1-5 Units  1-5 Units Subcutaneous HS CHELSEA Ny        [facility] Hold] ipratropium-albuterol (DUO-NEB) 0 5-2 5 mg/3 mL inhalation solution 3 mL  3 mL Nebulization TID TANK Haywood        [facility] Hold] metoprolol succinate (TOPROL-XL) 24 hr tablet 50 mg  50 mg Oral Daily TANK Haywood   50 mg at 12/23/19 0854    [MAR Hold] multivitamin-minerals (CENTRUM) tablet 1 tablet  1 tablet Oral Daily CHELSEA Aldana   1 tablet at 12/23/19 0854    [MAR Hold] ondansetron (ZOFRAN) injection 4 mg  4 mg Intravenous Q8H PRN CHELSEA Aldana        [MAR Hold] pantoprazole (PROTONIX) EC tablet 40 mg  40 mg Oral [facility], PA-C   40 mg at 12/23/19 0854    [MAR Hold] ranolazine (RANEXA) 12 hr tablet 500 mg  500 mg Oral BID LISANDRO Johnson-C   500 mg at 12/23/19 0854    [MAR Hold] sertraline (ZOLOFT) tablet 100 mg  100 mg Oral Daily LISANDRO Johnson-C   100 mg at 12/23/19 0854    [MAR Hold] tamsulosin (FLOMAX) capsule 0 4 mg  0 4 mg Oral Daily CHELSEA Leos   0 4 mg at 12/23/19 0854    [MAR Hold] thiamine (VITAMIN B1) tablet 100 mg  100 mg Oral Daily CHELSEA Campos   100 mg at 12/23/19 1924     Invasive Devices     Peripheral Intravenous Line            Peripheral IV 12/22/19 Right Antecubital 1 day    Peripheral IV 12/22/19 Right Forearm 1 day              Lab, Imaging and other studies: I have personally reviewed pertinent reports      VTE Pharmacologic Prophylaxis: Heparin  VTE Mechanical Prophylaxis: sequential compression device    Jeri Eric DO

## 2019-12-23 NOTE — PHYSICAL THERAPY NOTE
PT EVALUATION   Pt is not in need of skilled OT services at this time as pt is independent with ADLs  Pt will cont to benefit from skilled PT services  12/23/19 1300   Note Type   Note type Eval only   Pain Assessment   Pain Assessment 0-10   Pain Score 3   Pain Type Acute pain   Pain Location Back; Hip   Pain Orientation Lower; Left   Home Living   Type of Home Apartment  (no KAREN)   Home Layout One level   Home Equipment   (no DME per pt)   Prior Function   Level of [place] Independent with ADLs and functional mobility   Lives With Alone   ADL Assistance Independent   IADLs Independent   Falls in the last 6 months 1 to 4   Comments Pt does not drive;uses bus for transportation   Restrictions/Precautions   Other Precautions Fall Risk;Multiple lines; Bed Alarm; Chair Alarm;Pain;Contact/isolation   General   Additional Pertinent History Pt adm with a fall  Pt with subacute right 8th and 9th rib Fx's and left 8th rib Fx  Family/Caregiver Present No   Cognition   Overall Cognitive Status WFL   Arousal/Participation Cooperative   Orientation Level Oriented X4   Following Commands Follows all commands and directions without difficulty   RLE Assessment   RLE Assessment WFL  (4-/5 except ankle DF 2+ to 3-/5)   LLE Assessment   LLE Assessment WFL  (4-/5)   Bed Mobility   Supine to Sit 7  Independent   Sit to Supine 7  Independent   Transfers   Sit to Stand 7  Independent   Stand to Sit 7  Independent   Toilet transfer 7  Independent   Additional items   (Pt independent with hygiene per pt)   Additional Comments Pt reports amb to and from bathroom on his own  Ambulation/Elevation   Gait pattern Steppage  (mild, occ unsteadiness;pt able to self correct;R foot drop)   Gait Assistance   (min A/S)   Additional items Assist x 1;Verbal cues; Tactile cues   Assistive Device None   Distance 100 feet with change in direction   Balance   Static Sitting Good   Static Standing Fair +   Dynamic Standing Fair -   Ambulatory (P+/F-)   Activity Tolerance   Activity Tolerance Patient tolerated treatment well   Assessment   Prognosis Good   Problem List Decreased strength;Decreased range of motion;Decreased endurance; Impaired balance;Decreased mobility; Decreased coordination;Decreased safety awareness;Pain   Assessment Patient seen for Physical Therapy evaluation  Patient admitted with Dehydration  Comorbidities affecting patient's physical performance include: COPD, DM2, HTN, CAD, ENMA on CKD, hyponatremia, diastolic heart failure, ETOH abuse, CABG, HLD  Personal factors affecting patient at time of initial evaluation include: lives in one story house, limited home support and positive fall history  Prior to admission, patient was independent with functional mobility without assistive device, independent with ADLS, independent with IADLS, living alone in one story home with no steps to enter, ambulating household distance and ambulating community distances  Please find objective findings from Physical Therapy assessment regarding body systems outlined above with impairments and limitations including weakness, decreased ROM, impaired balance, decreased endurance, impaired coordination, gait deviations, pain, decreased activity tolerance, decreased functional mobility tolerance, decreased safety awareness and fall risk  The Barthel Index was used as a functional outcome tool presenting with a score of 70 today indicating moderate limitations of functional mobility and ADLS  Patient's clinical presentation is currently unstable/unpredictable as seen in patient's presentation of vital sign response, changing level of pain, increased fall risk, new onset of impairment of functional mobility, decreased endurance and new onset of weakness  Pt would benefit from continued Physical Therapy treatment to address deficits as defined above and maximize level of functional mobility   As demonstrated by objective findings, the assigned level of complexity for this evaluation is high  Goals   Patient Goals go home   STG Expiration Date 12/30/19   Short Term Goal #1 pt will amb w/out AD functional household distances - S/I   Short Term Goal #2 balance - F/F+ for safe mobility and to decrease fall risk   LTG Expiration Date 01/06/20   Long Term Goal #1 pt will return to independent functional mobility, home and community, levels/unlevels as previous w/out AD   Long Term Goal #2 balance - F+/G for safe mobility and to decrease fall risk; strength LEs - 4/5 grossly   Plan   Treatment/Interventions ADL retraining;Functional transfer training;LE strengthening/ROM; Elevations; Therapeutic exercise; Endurance training;Patient/family training;Equipment eval/education; Bed mobility;Gait training; Compensatory technique education   PT Frequency 2-3x/wk   Recommendation   Recommendation Home independently   Barthel Index   Feeding 10   Bathing 0   Grooming Score 5   Dressing Score 10   Bladder Score 10   Bowels Score 10   Toilet Use Score 10   Transfers (Bed/Chair) Score 15   Mobility (Level Surface) Score 0   Stairs Score 0   Barthel Index Score 79   Licensure   [place] License Number  [facility] [place], [place] [id number]

## 2019-12-23 NOTE — PROGRESS NOTES
Progress Note Ben Tate 1960, 64 y o  male MRN: [id number]    Unit/Bed#: ICU 04 Encounter: [id number]    Primary Care Provider: Stella Hernandez MD   Date and time admitted to hospital: 12/22/2019  1:49 AM    * Dehydration  Assessment & Plan  · Secondary to diarrhea  · Cont IVF but otherwise improved  · Diet as tolerated    Shock (HCC)resolved as of 12/23/2019  Assessment & Plan  · Levo weaned off  · Resolved    Alcohol intoxication ([facility] )  Assessment & Plan  · Patient reports that he used to be a heavy drinker and primarily drank vodka until recently when he tried to cut back  This week he went several days without drinking as he was not feeling well but did report drinking several beers over the last few days  He states that he did feel withdrawal symptoms of chills, diaphoresis and diarrhea this week  · Medical alcohol level 298 on admission  · Cont thiamine/ folate/ MVI  · Phenobarb loaded to prevent withdrawal symptoms, only mild tremor noted otherwise CIWA stable  · Continue CIWA  · Aspiration/seizure precautions    Acute kidney injury superimposed on CKD ([facility] )  Assessment & Plan  · ENMA on CKD present on admission  Creatinine 2 2  Baseline appears to be 0 9-1 0  Down to 0 99 this am  · Prerenal secondary to dehydration vs ATN secondary to hypotension  · Creatinine has been as high as 3 on previous admissions  · Close I&O monitoring  · Daily weights  · Avoid nephrotoxins     Diarrhea  Assessment & Plan  · Patient reports diarrhea for the past week  Reports going to the bathroom 6-8 times daily  · CT scan shows diverticulosis without evidence of acute diverticulitis  · Digital rectal exam in the ER with brown stool but + for occult blood  · Diarrhea ? secondary to alcohol withdrawal as patient admits to recently cutting down on alcohol intake  · Will check C   Diff and enteric stool panel, still pending to be sent  · No bowel movement since admission, continue to monitor  · Continue IV hydration    Thrombocytopenia (HCC)  Assessment & Plan  · Platelets 72 on admission, 61 today  · ?  Secondary to chronic alcohol use  · Baseline platelets in the low 100s   · Continue to monitor closely     Hyponatremia  Assessment & Plan  · Mild hyponatremia on admission with sodium 132  · Patient received 4L NSS in the ER   · This am up to 138      Hypomagnesemia  Assessment & Plan  · 4g Mag Sulfate IV ordered today for level 1 4  · Continue to monitor and replace for goal >2    COPD (chronic obstructive pulmonary disease) ([facility] )  Assessment & Plan  · COPD not in acute exacerbation  · Reports smoking 1 5 PPD  · Not on continuous oxygen therapy   · Continue spiriva, advair (advair not on formulary, will give breo)    Diastolic heart failure ([address] )  Assessment & Plan  Wt Readings from Last 3 Encounters:   12/23/19 87 8 kg (193 lb 9 oz)   11/22/19 88 7 kg (195 lb 8 8 oz)   10/28/19 90 kg (198 lb 6 4 oz)     · Echo on 6/8/19 shows EF 50-55% with grade II diastolic dysfunction]  · BNP mildly elevated on admission  · CT scan without signs of pulmonary edema  · Monitor volume status closely after fluid resuscitation   · Close I&Os  · Daily weights    Medical non-compliance  Assessment & Plan  · Patient has multiple pill bottles with him on admission with some of them not having been filled since July of this year   · Patient reports med compliance but does not know what medications he takes and is a poor historian     CAD (coronary artery disease)  Assessment & Plan  · CAD s/p CABG in 2004, PCI in 2014   · Denies chest pain  · EKG without ST changes  · Troponin <0 02    Essential (primary) hypertension  Assessment & Plan  · Hold home Norvasc, Metoprolol, Fosinopril secondary to hypotension on admission  · Restart as BP tolerates    Type 2 diabetes mellitus [facility])  Assessment & Plan  Lab Results   Component Value Date    HGBA1C 5 4 11/23/2019       Recent Labs     12/22/19  1123 12/22/19  1629 12/22/19  2108 12/23/19  4053 POCGLU 95 102 82 80       Blood Sugar Average: Last 72 hrs:  (P) 89 75     · Hold home metformin  · Continue SSI   · Recent A1c 5 4      ----------------------------------------------------------------------------------------  HPI/24hr events: No acute issues overnight; tolerating diet; denies any complaints this am, denies chest pain or SOB; denies abd pain; States he is feeling much better; No reports of diarrhea since yesterday, will monitor and send specimen if able to obtain    Disposition: Transfer to [facility]   Code Status: Level 1 - Full Code  ---------------------------------------------------------------------------------------  SUBJECTIVE    Review of Systems   Constitutional: Negative  Eyes: Negative  Respiratory: Negative  Cardiovascular: Negative  Gastrointestinal: Negative  Genitourinary: Negative  Musculoskeletal: Negative  Skin: Negative  Neurological: Negative  Psychiatric/Behavioral: Negative  All other systems reviewed and are negative       ---------------------------------------------------------------------------------------  OBJECTIVE    Physical Exam   Constitutional: He is oriented to person, place, and time  He appears well-developed and well-nourished  No distress  HENT:   Head: Normocephalic and atraumatic  Eyes: Pupils are equal, round, and reactive to light  EOM are normal    Neck: Normal range of motion  Cardiovascular: Normal rate and regular rhythm  Pulmonary/Chest: Effort normal  No respiratory distress  He has decreased breath sounds in the right lower field and the left lower field  Abdominal: Soft  Bowel sounds are normal  He exhibits no distension  There is no tenderness  Musculoskeletal: Normal range of motion  He exhibits no edema  Neurological: He is alert and oriented to person, place, and time  Skin: Skin is warm and dry  Capillary refill takes less than 2 seconds  He is not diaphoretic     Psychiatric: He has a normal mood and affect  His behavior is normal    Nursing note and vitals reviewed  Vitals   Vitals:    19 0300 19 0424 19 0500 19 0549   BP:   166/88    BP Location:   Left arm    Pulse: 72  72    Resp: 21  (!) 23    Temp:  99 1 °F (37 3 °C)     TempSrc:  Temporal     SpO2: 92%  93%    Weight:    87 8 kg (193 lb 9 oz)   Height:         Temp (24hrs), Av 5 °F (36 9 °C), Min:97 8 °F (36 6 °C), Max:99 1 °F (37 3 °C)  Current: Temperature: 99 1 °F (37 3 °C)          Invasive/non-invasive ventilation settings   Respiratory    Lab Data (Last 4 hours)    None         O2/Vent Data (Last 4 hours)    None                Height and Weights   Height: 6' 2" (188 cm)  IBW: 82 2 kg  Body mass index is 24 85 kg/m²  Weight (last 2 days)     Date/Time   Weight    19 0549   87 8 (193 56)    19 0450   90 (198 41)                Intake and Output  I/O        0701 -  0700  0701 -  0700  0701 -  0700    P  O   1350     I V  (mL/kg) 180 3 (2) 1652 6 (18 8)     IV Piggyback 6150 50     Total Intake(mL/kg) 6330 3 (70 3) 3052 6 (34 8)     Urine (mL/kg/hr) 600 4575 (2 2)     Total Output 600 4575     Net +5730 3 -1522 4                  Nutrition       Diet Orders   (From admission, onward)             Start     Ordered    19  Diet Vlad/CHO Controlled; Consistent Carbohydrate Diet Level 2 (5 carb servings/75 grams CHO/meal); Sodium 4 GM (WALTER)  Diet effective now     Question Answer Comment   Diet Type Vlad/CHO Controlled    Vlad/CHO Controlled Consistent Carbohydrate Diet Level 2 (5 carb servings/75 grams CHO/meal)    Other Restriction(s): Sodium 4 GM (WALTER)    RD to adjust diet per protocol?  Yes        19                Laboratory and Diagnostics:  Results from last 7 days   Lab Units 19  0547 19  0912 19  0613 19  0158   WBC Thousand/uL 5 34  --  5 89 9 95   HEMOGLOBIN g/dL 12 4 12 4 12 2 14 4   HEMATOCRIT % 37 7  --  37 3 42 1 PLATELETS Thousands/uL 61*  --  57* 72*   NEUTROS PCT % 61  --   --   --    MONOS PCT % 14*  --   --   --    MONO PCT %  --   --  11 13*     Results from last 7 days   Lab Units 12/23/19  0547 12/22/19  0613 12/22/19  0158   SODIUM mmol/L 138 136 132*   POTASSIUM mmol/L 3 9 3 6 3 5   CHLORIDE mmol/L 102 102 96*   CO2 mmol/L 27 24 25   ANION GAP mmol/L 9 10 11   BUN mg/dL 17 30* 34*   CREATININE mg/dL 0 99 1 77* 2 22*   CALCIUM mg/dL 7 7* 6 8* 8 4   GLUCOSE RANDOM mg/dL 87 105 106   ALT U/L 56 47 41   AST U/L 62* 56* 32   ALK PHOS U/L 88 66 82   ALBUMIN g/dL 3 0* 2 8* 3 6   TOTAL BILIRUBIN mg/dL 0 90 0 30 0 40     Results from last 7 days   Lab Units 12/23/19  0547 12/22/19  0613 12/22/19  0158   MAGNESIUM mg/dL 1 4* 2 0 1 5*   PHOSPHORUS mg/dL 2 8 3 9  --       Results from last 7 days   Lab Units 12/22/19  0201   INR  0 93   PTT seconds 27      Results from last 7 days   Lab Units 12/22/19  0215   TROPONIN I ng/mL <0 02     Results from last 7 days   Lab Units 12/22/19  0610 12/22/19  0240   LACTIC ACID mmol/L 1 6 2 9*     ABG:    VBG:    Results from last 7 days   Lab Units 12/22/19  0613   PROCALCITONIN ng/ml 0 09       Micro  Results from last 7 days   Lab Units 12/22/19  0205 12/22/19  0200   BLOOD CULTURE  Received in Microbiology Lab  Culture in Progress  Received in Microbiology Lab  Culture in Progress  EKG: NSR  Imaging: I have personally reviewed pertinent reports        Active Medications  Scheduled Meds:  Current Facility-Administered Medications:  atorvastatin 40 mg Oral Daily [address]    clopidogrel 75 mg Oral Daily Robinson Wong PA-C    famotidine 20 mg Oral BID CHELSEA De Souza    fluticasone-vilanterol 1 puff Inhalation Daily CHELSEA Hill    folic acid 1 mg Oral Daily CHELSEA De Souza    heparin (porcine) 5,000 Units Subcutaneous [facility] CHELSEA De Souza    insulin lispro 1-5 Units Subcutaneous TID AC CHELSEA De Souza insulin lispro 1-5 Units Subcutaneous HS CHELSEA Aguero    ipratropium-albuterol 3 mL Nebulization Q6H Sheryle Bolder, PA-C    magnesium sulfate 4 g Intravenous Once CHELSEA Amaya    multi-electrolyte 150 mL/hr [facility]ous [facility] [place] [place] Last Rate: 150 mL/hr (12/23/19 0615)   multivitamin-minerals 1 tablet Oral Daily CHELSEA Amaya    ondansetron 4 mg Intravenous Q8H PRN CHELSEA Amaya    tamsulosin 0 4 mg Oral Daily CHELSEA Aguero    thiamine 100 mg Oral Daily CHELSEA Amaya      Continuous Infusions:    multi-electrolyte 150 mL/hr Last Rate: 150 mL/hr (12/23/19 0615)     PRN Meds:     ondansetron 4 mg Q8H PRN       ---------------------------------------------------------------------------------------  Advance Directive and Living Will:      Power of :    POLST:    ---------------------------------------------------------------------------------------    CHELSEA Amaya

## 2019-12-23 NOTE — SOCIAL WORK
LOS -  1 day    30 day readmission  CPR2    SW met with pt to assess needs and discuss plans  Discussed goals of making sure pt's needs are met upon discharge and pt's preferences are taken into account  Pt is a 30 day readmission  Explored with pt factors that may have led to readmission  Per pt he hadn't been feeling well for about at week  He was trying to stop drinking because that was what was recommended when he was last discharged  He feels he may have gotten dehydrated and his blood pressure dropped which may have been why he fell  Pt lives alone in an apartment  Relatively independent  No DME or HHC  Pt does not drive  Pt's PCP is [facility]  Preferred pharmacy is [facility]   Per pt he has prescription coverage through [facility] and has no difficulty getting his medication as prescribed  Pt does not have an advanced directive and is not interested in information at this time  Per pt his son would be his healthcare representative if needed  Pt's plan is to return home when discharged  Offered pt home care services and  list of area agencies to consider and discussed agreement with [facility] [address] to initiate services to [employer] program pts within 24 hours  Pt declined services at this time stating he doesn't need them  No other discharge needs expressed or anticipated by pt at this time  Offered support in future if needed

## 2019-12-23 NOTE — ASSESSMENT & PLAN NOTE
Lab Results   Component Value Date    HGBA1C 5 4 11/23/2019       Recent Labs     12/22/19  1123 12/22/19  1629 12/22/19  2108 12/23/19  0642   POCGLU 95 102 82 80       Blood Sugar Average: Last 72 hrs:  (P) 89 75     · Hold home metformin  · Continue SSI   · Recent A1c 5 4

## 2019-12-23 NOTE — PLAN OF CARE
Problem: Potential for Falls  Goal: Patient will remain free of falls  Description  INTERVENTIONS:  - Assess patient frequently for physical needs  -  Identify cognitive and physical deficits and behaviors that affect risk of falls    -  [place] fall precautions as indicated by assessment   - Educate patient/family on patient safety including physical limitations  - Instruct patient to call for assistance with activity based on assessment  - Modify environment to reduce risk of injury  - Consider OT/PT consult to assist with strengthening/mobility  Outcome: Progressing     Problem: METABOLIC, FLUID AND ELECTROLYTES - ADULT  Goal: Electrolytes maintained within normal limits  Description  INTERVENTIONS:  - Monitor labs and assess patient for signs and symptoms of electrolyte imbalances  - Administer electrolyte replacement as ordered  - Monitor response to electrolyte replacements, including repeat lab results as appropriate  - Instruct patient on fluid and nutrition as appropriate  Outcome: Progressing  Goal: Fluid balance maintained  Description  INTERVENTIONS:  - Monitor labs   - Monitor I/O and WT  - Instruct patient on fluid and nutrition as appropriate  - Assess for signs & symptoms of volume excess or deficit  Outcome: Progressing     Problem: CARDIOVASCULAR - ADULT  Goal: Maintains optimal cardiac output and hemodynamic stability  Description  INTERVENTIONS:  - Monitor I/O, vital signs and rhythm  - Monitor for S/S and trends of decreased cardiac output  - Administer and titrate ordered vasoactive medications to optimize hemodynamic stability  - Assess quality of pulses, skin color and temperature  - Assess for signs of decreased coronary artery perfusion  - Instruct patient to report change in severity of symptoms  Outcome: Progressing  Goal: Absence of cardiac dysrhythmias or at baseline rhythm  Description  INTERVENTIONS:  - Continuous cardiac monitoring, vital signs, obtain 12 lead EKG if ordered  - Administer antiarrhythmic and heart rate control medications as ordered  - Monitor electrolytes and administer replacement therapy as ordered  Outcome: Progressing     Problem: SAFETY ADULT  Goal: Patient will remain free of falls  Description  INTERVENTIONS:  - Assess patient frequently for physical needs  -  Identify cognitive and physical deficits and behaviors that affect risk of falls    -  [place] fall precautions as indicated by assessment   - Educate patient/family on patient safety including physical limitations  - Instruct patient to call for assistance with activity based on assessment  - Modify environment to reduce risk of injury  - Consider OT/PT consult to assist with strengthening/mobility  Outcome: Progressing  Goal: Maintain or return to baseline ADL function  Description  INTERVENTIONS:  -  Assess patient's ability to carry out ADLs; assess patient's baseline for ADL function and identify physical deficits which impact ability to perform ADLs (bathing, care of mouth/teeth, toileting, grooming, dressing, etc )  - Assess/evaluate cause of self-care deficits   - Assess range of motion  - Assess patient's mobility; develop plan if impaired  - Assess patient's need for assistive devices and provide as appropriate  - Encourage maximum independence but intervene and supervise when necessary  - Involve family in performance of ADLs  - Assess for home care needs following discharge   - Consider OT consult to assist with ADL evaluation and planning for discharge  - Provide patient education as appropriate  Outcome: Progressing  Goal: Maintain or return mobility status to optimal level  Description  INTERVENTIONS:  - Assess patient's baseline mobility status (ambulation, transfers, stairs, etc )    - Identify cognitive and physical deficits and behaviors that affect mobility  - Identify mobility aids required to assist with transfers and/or ambulation (gait belt, sit-to-stand, lift, walker, cane, etc )  - [place] fall precautions as indicated by assessment  - Record patient progress and toleration of activity level on Mobility SBAR; progress patient to next Phase/Stage  - Instruct patient to call for assistance with activity based on assessment  - Consider rehabilitation consult to assist with strengthening/weightbearing, etc   Outcome: Progressing     Problem: Knowledge Deficit  Goal: Patient/family/caregiver demonstrates understanding of disease process, treatment plan, medications, and discharge instructions  Description  Complete learning assessment and assess knowledge base    Interventions:  - Provide teaching at level of understanding  - Provide teaching via preferred learning methods  Outcome: Progressing     Problem: RESPIRATORY - ADULT  Goal: Achieves optimal ventilation and oxygenation  Description  INTERVENTIONS:  - Assess for changes in respiratory status  - Assess for changes in mentation and behavior  - Position to facilitate oxygenation and minimize respiratory effort  - Oxygen administered by appropriate delivery if ordered  - Initiate smoking cessation education as indicated  - Encourage broncho-pulmonary hygiene including cough, deep breathe, Incentive Spirometry  - Assess the need for suctioning and aspirate as needed  - Assess and instruct to report SOB or any respiratory difficulty  - Respiratory Therapy support as indicated  Outcome: Progressing

## 2019-12-23 NOTE — ASSESSMENT & PLAN NOTE
· Patient reports that he used to be a heavy drinker and primarily drank vodka until recently when he tried to cut back  This week he went several days without drinking as he was not feeling well but did report drinking several beers over the last few days  He states that he did feel withdrawal symptoms of chills, diaphoresis and diarrhea this week     · Medical alcohol level 298 on admission  · Cont thiamine/ folate/ MVI  · Phenobarb loaded to prevent withdrawal symptoms, only mild tremor noted otherwise CIWA stable  · Continue CIWA  · Aspiration/seizure precautions

## 2019-12-23 NOTE — NURSING NOTE
Report called to Kristel Julio on [address] [facility] [address]  Pt transferred to room 209

## 2019-12-23 NOTE — ASSESSMENT & PLAN NOTE
Wt Readings from Last 3 Encounters:   12/23/19 87 8 kg (193 lb 9 oz)   11/22/19 88 7 kg (195 lb 8 8 oz)   10/28/19 90 kg (198 lb 6 4 oz)     · Echo on 6/8/19 shows EF 50-55% with grade II diastolic dysfunction]  · BNP mildly elevated on admission  · CT scan without signs of pulmonary edema  · Monitor volume status closely after fluid resuscitation   · Close I&Os  · Daily weights

## 2019-12-24 LAB
ANION GAP SERPL CALCULATED.3IONS-SCNC: 10 MMOL/L (ref 4–13)
ATRIAL RATE: 71 BPM
BASOPHILS # BLD AUTO: 0.05 THOUSANDS/ΜL (ref 0–0.1)
BASOPHILS NFR BLD AUTO: 1 % (ref 0–1)
BUN SERPL-MCNC: 14 MG/DL (ref 5–25)
C DIFF TOX GENS STL QL NAA+PROBE: NEGATIVE
CALCIUM SERPL-MCNC: 8.4 MG/DL (ref 8.3–10.1)
CAMPYLOBACTER DNA SPEC NAA+PROBE: NORMAL
CHLORIDE SERPL-SCNC: 97 MMOL/L (ref 100–108)
CO2 SERPL-SCNC: 29 MMOL/L (ref 21–32)
CREAT SERPL-MCNC: 0.97 MG/DL (ref 0.6–1.3)
EOSINOPHIL # BLD AUTO: 0.11 THOUSAND/ΜL (ref 0–0.61)
EOSINOPHIL NFR BLD AUTO: 2 % (ref 0–6)
ERYTHROCYTE [DISTWIDTH] IN BLOOD BY AUTOMATED COUNT: 12.3 % (ref 11.6–15.1)
GFR SERPL CREATININE-BSD FRML MDRD: 86 ML/MIN/1.73SQ M
GLUCOSE SERPL-MCNC: 103 MG/DL (ref 65–140)
GLUCOSE SERPL-MCNC: 103 MG/DL (ref 65–140)
GLUCOSE SERPL-MCNC: 114 MG/DL (ref 65–140)
GLUCOSE SERPL-MCNC: 156 MG/DL (ref 65–140)
GLUCOSE SERPL-MCNC: 164 MG/DL (ref 65–140)
HCT VFR BLD AUTO: 37.7 % (ref 36.5–49.3)
HGB BLD-MCNC: 13.1 G/DL (ref 12–17)
IMM GRANULOCYTES # BLD AUTO: 0.04 THOUSAND/UL (ref 0–0.2)
IMM GRANULOCYTES NFR BLD AUTO: 1 % (ref 0–2)
LYMPHOCYTES # BLD AUTO: 1.1 THOUSANDS/ΜL (ref 0.6–4.47)
LYMPHOCYTES NFR BLD AUTO: 15 % (ref 14–44)
MAGNESIUM SERPL-MCNC: 1.2 MG/DL (ref 1.6–2.6)
MCH RBC QN AUTO: 33.7 PG (ref 26.8–34.3)
MCHC RBC AUTO-ENTMCNC: 34.7 G/DL (ref 31.4–37.4)
MCV RBC AUTO: 97 FL (ref 82–98)
MONOCYTES # BLD AUTO: 1.12 THOUSAND/ΜL (ref 0.17–1.22)
MONOCYTES NFR BLD AUTO: 16 % (ref 4–12)
NEUTROPHILS # BLD AUTO: 4.72 THOUSANDS/ΜL (ref 1.85–7.62)
NEUTS SEG NFR BLD AUTO: 65 % (ref 43–75)
NRBC BLD AUTO-RTO: 0 /100 WBCS
P AXIS: 73 DEGREES
PHOSPHATE SERPL-MCNC: 1.9 MG/DL (ref 2.7–4.5)
PLATELET # BLD AUTO: 87 THOUSANDS/UL (ref 149–390)
PMV BLD AUTO: 12.5 FL (ref 8.9–12.7)
POTASSIUM SERPL-SCNC: 3.7 MMOL/L (ref 3.5–5.3)
PR INTERVAL: 136 MS
QRS AXIS: 90 DEGREES
QRSD INTERVAL: 90 MS
QT INTERVAL: 428 MS
QTC INTERVAL: 465 MS
RBC # BLD AUTO: 3.89 MILLION/UL (ref 3.88–5.62)
SALMONELLA DNA SPEC QL NAA+PROBE: NORMAL
SHIGA TOXIN STX GENE SPEC NAA+PROBE: NORMAL
SHIGELLA DNA SPEC QL NAA+PROBE: NORMAL
SODIUM SERPL-SCNC: 136 MMOL/L (ref 136–145)
T WAVE AXIS: 54 DEGREES
VENTRICULAR RATE: 71 BPM
WBC # BLD AUTO: 7.14 THOUSAND/UL (ref 4.31–10.16)

## 2019-12-24 PROCEDURE — 82948 REAGENT STRIP/BLOOD GLUCOSE: CPT

## 2019-12-24 PROCEDURE — 93010 ELECTROCARDIOGRAM REPORT: CPT | Performed by: INTERNAL MEDICINE

## 2019-12-24 PROCEDURE — 85025 COMPLETE CBC W/AUTO DIFF WBC: CPT | Performed by: STUDENT IN AN ORGANIZED HEALTH CARE EDUCATION/TRAINING PROGRAM

## 2019-12-24 PROCEDURE — 83735 ASSAY OF MAGNESIUM: CPT | Performed by: STUDENT IN AN ORGANIZED HEALTH CARE EDUCATION/TRAINING PROGRAM

## 2019-12-24 PROCEDURE — NC001 PR NO CHARGE: Performed by: FAMILY MEDICINE

## 2019-12-24 PROCEDURE — 94640 AIRWAY INHALATION TREATMENT: CPT

## 2019-12-24 PROCEDURE — 99222 1ST HOSP IP/OBS MODERATE 55: CPT | Performed by: FAMILY MEDICINE

## 2019-12-24 PROCEDURE — 94760 N-INVAS EAR/PLS OXIMETRY 1: CPT

## 2019-12-24 PROCEDURE — 80048 BASIC METABOLIC PNL TOTAL CA: CPT | Performed by: STUDENT IN AN ORGANIZED HEALTH CARE EDUCATION/TRAINING PROGRAM

## 2019-12-24 PROCEDURE — 84100 ASSAY OF PHOSPHORUS: CPT | Performed by: STUDENT IN AN ORGANIZED HEALTH CARE EDUCATION/TRAINING PROGRAM

## 2019-12-24 RX ORDER — LORAZEPAM 1 MG/1
2 TABLET ORAL ONCE
Status: COMPLETED | OUTPATIENT
Start: 2019-12-24 | End: 2019-12-24

## 2019-12-24 RX ORDER — ACETAMINOPHEN 325 MG/1
650 TABLET ORAL ONCE
Status: COMPLETED | OUTPATIENT
Start: 2019-12-24 | End: 2019-12-24

## 2019-12-24 RX ORDER — MAGNESIUM SULFATE HEPTAHYDRATE 40 MG/ML
4 INJECTION, SOLUTION INTRAVENOUS ONCE
Status: COMPLETED | OUTPATIENT
Start: 2019-12-24 | End: 2019-12-24

## 2019-12-24 RX ADMIN — NICOTINE 21 MG: 21 PATCH TRANSDERMAL at 08:16

## 2019-12-24 RX ADMIN — CLOPIDOGREL BISULFATE 75 MG: 75 TABLET ORAL at 08:16

## 2019-12-24 RX ADMIN — HEPARIN SODIUM 5000 UNITS: 5000 INJECTION INTRAVENOUS; SUBCUTANEOUS at 14:02

## 2019-12-24 RX ADMIN — IPRATROPIUM BROMIDE AND ALBUTEROL SULFATE 3 ML: 2.5; .5 SOLUTION RESPIRATORY (INHALATION) at 08:27

## 2019-12-24 RX ADMIN — FLUTICASONE FUROATE AND VILANTEROL TRIFENATATE 1 PUFF: 200; 25 POWDER RESPIRATORY (INHALATION) at 08:26

## 2019-12-24 RX ADMIN — FOLIC ACID 1 MG: 1 TABLET ORAL at 08:16

## 2019-12-24 RX ADMIN — Medication 100 MG: at 08:16

## 2019-12-24 RX ADMIN — IPRATROPIUM BROMIDE AND ALBUTEROL SULFATE 3 ML: 2.5; .5 SOLUTION RESPIRATORY (INHALATION) at 19:41

## 2019-12-24 RX ADMIN — METOPROLOL SUCCINATE 50 MG: 50 TABLET, EXTENDED RELEASE ORAL at 08:16

## 2019-12-24 RX ADMIN — TAMSULOSIN HYDROCHLORIDE 0.4 MG: 0.4 CAPSULE ORAL at 08:16

## 2019-12-24 RX ADMIN — HEPARIN SODIUM 5000 UNITS: 5000 INJECTION INTRAVENOUS; SUBCUTANEOUS at 05:51

## 2019-12-24 RX ADMIN — INSULIN LISPRO 1 UNITS: 100 INJECTION, SOLUTION INTRAVENOUS; SUBCUTANEOUS at 21:30

## 2019-12-24 RX ADMIN — Medication 1 TABLET: at 08:16

## 2019-12-24 RX ADMIN — RANOLAZINE 500 MG: 500 TABLET, FILM COATED, EXTENDED RELEASE ORAL at 21:30

## 2019-12-24 RX ADMIN — PANTOPRAZOLE SODIUM 40 MG: 40 TABLET, DELAYED RELEASE ORAL at 05:51

## 2019-12-24 RX ADMIN — IPRATROPIUM BROMIDE AND ALBUTEROL SULFATE 3 ML: 2.5; .5 SOLUTION RESPIRATORY (INHALATION) at 14:19

## 2019-12-24 RX ADMIN — HEPARIN SODIUM 5000 UNITS: 5000 INJECTION INTRAVENOUS; SUBCUTANEOUS at 21:30

## 2019-12-24 RX ADMIN — AMLODIPINE BESYLATE 5 MG: 5 TABLET ORAL at 08:16

## 2019-12-24 RX ADMIN — LORAZEPAM 2 MG: 1 TABLET ORAL at 22:36

## 2019-12-24 RX ADMIN — INSULIN LISPRO 1 UNITS: 100 INJECTION, SOLUTION INTRAVENOUS; SUBCUTANEOUS at 11:33

## 2019-12-24 RX ADMIN — ATORVASTATIN CALCIUM 40 MG: 40 TABLET, FILM COATED ORAL at 17:11

## 2019-12-24 RX ADMIN — MAGNESIUM SULFATE HEPTAHYDRATE 4 G: 40 INJECTION, SOLUTION INTRAVENOUS at 08:13

## 2019-12-24 RX ADMIN — ACETAMINOPHEN 650 MG: 325 TABLET, FILM COATED ORAL at 18:22

## 2019-12-24 RX ADMIN — SERTRALINE HYDROCHLORIDE 100 MG: 100 TABLET ORAL at 08:16

## 2019-12-24 RX ADMIN — RANOLAZINE 500 MG: 500 TABLET, FILM COATED, EXTENDED RELEASE ORAL at 08:16

## 2019-12-24 NOTE — PLAN OF CARE
Problem: Potential for Falls  Goal: Patient will remain free of falls  Description  INTERVENTIONS:  - Assess patient frequently for physical needs  -  Identify cognitive and physical deficits and behaviors that affect risk of falls    -  [place] fall precautions as indicated by assessment   - Educate patient/family on patient safety including physical limitations  - Instruct patient to call for assistance with activity based on assessment  - Modify environment to reduce risk of injury  - Consider OT/PT consult to assist with strengthening/mobility  Outcome: Progressing     Problem: METABOLIC, FLUID AND ELECTROLYTES - ADULT  Goal: Electrolytes maintained within normal limits  Description  INTERVENTIONS:  - Monitor labs and assess patient for signs and symptoms of electrolyte imbalances  - Administer electrolyte replacement as ordered  - Monitor response to electrolyte replacements, including repeat lab results as appropriate  - Instruct patient on fluid and nutrition as appropriate  Outcome: Progressing  Goal: Fluid balance maintained  Description  INTERVENTIONS:  - Monitor labs   - Monitor I/O and WT  - Instruct patient on fluid and nutrition as appropriate  - Assess for signs & symptoms of volume excess or deficit  Outcome: Progressing     Problem: CARDIOVASCULAR - ADULT  Goal: Maintains optimal cardiac output and hemodynamic stability  Description  INTERVENTIONS:  - Monitor I/O, vital signs and rhythm  - Monitor for S/S and trends of decreased cardiac output  - Administer and titrate ordered vasoactive medications to optimize hemodynamic stability  - Assess quality of pulses, skin color and temperature  - Assess for signs of decreased coronary artery perfusion  - Instruct patient to report change in severity of symptoms  Outcome: Progressing  Goal: Absence of cardiac dysrhythmias or at baseline rhythm  Description  INTERVENTIONS:  - Continuous cardiac monitoring, vital signs, obtain 12 lead EKG if ordered  - Administer antiarrhythmic and heart rate control medications as ordered  - Monitor electrolytes and administer replacement therapy as ordered  Outcome: Progressing     Problem: SAFETY ADULT  Goal: Patient will remain free of falls  Description  INTERVENTIONS:  - Assess patient frequently for physical needs  -  Identify cognitive and physical deficits and behaviors that affect risk of falls    -  [place] fall precautions as indicated by assessment   - Educate patient/family on patient safety including physical limitations  - Instruct patient to call for assistance with activity based on assessment  - Modify environment to reduce risk of injury  - Consider OT/PT consult to assist with strengthening/mobility  Outcome: Progressing  Goal: Maintain or return to baseline ADL function  Description  INTERVENTIONS:  -  Assess patient's ability to carry out ADLs; assess patient's baseline for ADL function and identify physical deficits which impact ability to perform ADLs (bathing, care of mouth/teeth, toileting, grooming, dressing, etc )  - Assess/evaluate cause of self-care deficits   - Assess range of motion  - Assess patient's mobility; develop plan if impaired  - Assess patient's need for assistive devices and provide as appropriate  - Encourage maximum independence but intervene and supervise when necessary  - Involve family in performance of ADLs  - Assess for home care needs following discharge   - Consider OT consult to assist with ADL evaluation and planning for discharge  - Provide patient education as appropriate  Outcome: Progressing  Goal: Maintain or return mobility status to optimal level  Description  INTERVENTIONS:  - Assess patient's baseline mobility status (ambulation, transfers, stairs, etc )    - Identify cognitive and physical deficits and behaviors that affect mobility  - Identify mobility aids required to assist with transfers and/or ambulation (gait belt, sit-to-stand, lift, walker, cane, etc )  - [place] fall precautions as indicated by assessment  - Record patient progress and toleration of activity level on Mobility SBAR; progress patient to next Phase/Stage  - Instruct patient to call for assistance with activity based on assessment  - Consider rehabilitation consult to assist with strengthening/weightbearing, etc   Outcome: Progressing     Problem: Knowledge Deficit  Goal: Patient/family/caregiver demonstrates understanding of disease process, treatment plan, medications, and discharge instructions  Description  Complete learning assessment and assess knowledge base    Interventions:  - Provide teaching at level of understanding  - Provide teaching via preferred learning methods  Outcome: Progressing     Problem: RESPIRATORY - ADULT  Goal: Achieves optimal ventilation and oxygenation  Description  INTERVENTIONS:  - Assess for changes in respiratory status  - Assess for changes in mentation and behavior  - Position to facilitate oxygenation and minimize respiratory effort  - Oxygen administered by appropriate delivery if ordered  - Initiate smoking cessation education as indicated  - Encourage broncho-pulmonary hygiene including cough, deep breathe, Incentive Spirometry  - Assess the need for suctioning and aspirate as needed  - Assess and instruct to report SOB or any respiratory difficulty  - Respiratory Therapy support as indicated  Outcome: Progressing

## 2019-12-24 NOTE — ASSESSMENT & PLAN NOTE
Lab Results   Component Value Date    HGBA1C 5 4 11/23/2019       Recent Labs     12/23/19  0759 12/23/19  1131 12/23/19  1620 12/23/19  2118   POCGLU 89 129 112 111       Blood Sugar Average: Last 72 hrs:  (P) 100

## 2019-12-24 NOTE — PROGRESS NOTES
Progress Note Marcello Mai 1960, 64 y o  male MRN: [id number]    Unit/Bed#: [facility] Encounter: [id number]    Primary Care Provider: Teja Marie MD   Date and time admitted to hospital: 12/22/2019  1:49 AM    Dehydration  · 2/2 diarrhea, not feeling well, and alcohol use  · IVF discontinued as patient is tolerating p o  Diet  · Continue monitoring vitals, and reassessing fluid status      *Alcohol intoxication ([facility] )  · Admits to alcohol abuse with vodka, but recently has cut back  Was drinking beers this week, but stopped that due to feeling ill with diarrhea  · Has felt withdrawal symptoms of chills, diaphoresis and diarrhea this week  · EtOH 298 POA  · Cont thiamine/ folate/ MVI  · Phenobarb loaded to prevent withdrawal symptoms, only mild tremor noted otherwise CIWA stable  · Will continue CIWA  · Aspiration/seizure precautions     Acute kidney injury superimposed on CKD ([facility] ) - ENMA resolved   · ENMA on CKD POA: Cr 2 2  Baseline appears to be 0 9-1 0  Improved to 0 97 this am  · Prerenal 2/2 dehydration vs ATN 2/2 hypotension  · Creatinine has been as high as 3 on previous admissions  · Close I&O monitoring, & Daily weights  · Avoid nephrotoxins      Diarrhea  · Pt reports diarrhea for the past week, but unable to quantify  · CT scan shows diverticulosis without evidence of acute diverticulitis  · NELLIE in the ER with brown stool but + for occult blood  · Diarrhea possibly 2/2 alcohol withdrawal as pt admits to cutting back significantly  · C   Diff and enteric stool panel - pending  · No BM since admission, continue to monitor  · Continue IV hydration      Thrombocytopenia (HCC)  · Platelets 72 on admission, 87 today  · Possibly secondary to alcoholism  · Baseline platelets on chart review in the 100s  · Continue to monitor closely      Hypomagnesemia   · Mg today 1 4 - replete as needed for goal >2     COPD (chronic obstructive pulmonary disease) (HCC)  · COPD Stable, not in acute exacerbation  · Reports smoking 1 5 PPD  · Not on home oxygen  · Continue home spiriva, and Advair substituted with Breo due to formulary     Diastolic heart failure ([facility] )        Wt Readings from Last 3 Encounters:   12/23/19 87 8 kg (193 lb 9 oz)   11/22/19 88 7 kg (195 lb 8 8 oz)   10/28/19 90 kg (198 lb 6 4 oz)      · Echo on 6/8/19 shows EF 50-55% with grade II diastolic dysfunction]  · BNP mildly elevated on admission  · CT scan without signs of pulmonary edema  · Monitor volume status closely after fluid resuscitation   · Close I&Os  · Daily weights     Medical non-compliance  · Pt reports med compliance, but is unaware of his medications and is a poor historian  · Complicated by alcoholism     CAD (coronary artery disease)  · Stable, CAD s/p CABG in 2004, PCI in 2014   · EKG without ST changes  · Troponin <0 02     Essential (primary) hypertension  · Hypotensive on admission while being treated for hypovolemic shock  · Improved today to normal values and then elevated SBPs 190s  · Restarted home Norvasc, Metoprolol  · Holding home ACEi     Type 2 diabetes mellitus ([facility] )  · Well controlled: glu   · Hold home metformin   · Continue ISS Algo 2 with accucheks achs  · Recent A1c 5 4     Shock (HCC)resolved as of 12/23/2019  ? Levo weaned off  ? Resolved     Hyponatremia - resolved  · Mild hyponatremia on admission with sodium 132  · Received 4L NS in the ED  · This am 138         Subjective:   Patient seen examined at bedside, resting comfortably watching TV  Patient still appears to be tremulous, and anxious  Patient states he feels better as compared to yesterday however still endorsing tremors and feeling great  No new issues or concerns at time of exam    Objective:     Vitals: Blood pressure 166/88, pulse 76, temperature 100 1 °F (37 8 °C), resp  rate 18, height 6' 2" (1 88 m), weight 92 kg (202 lb 13 2 oz), SpO2 92 %  ,Body mass index is 26 04 kg/m²    Wt Readings from Last 3 Encounters:   12/24/19 92 kg (202 lb 13 2 oz)   11/22/19 88 7 kg (195 lb 8 8 oz)   10/28/19 90 kg (198 lb 6 4 oz)       Intake/Output Summary (Last 24 hours) at 12/24/2019 0701  Last data filed at 12/24/2019 0557  Gross per 24 hour   Intake 720 ml   Output 1700 ml   Net -980 ml       Physical Exam: Physical Exam   Constitutional: He is oriented to person, place, and time  He appears well-developed and well-nourished  HENT:   Head: Normocephalic  Eyes: Pupils are equal, round, and reactive to light  EOM are normal    Neck: Normal range of motion  Neck supple  Cardiovascular: Normal rate and regular rhythm  Pulmonary/Chest: Effort normal and breath sounds normal    Abdominal: Soft  Bowel sounds are normal    Musculoskeletal: Normal range of motion  Neurological: He is alert and oriented to person, place, and time  GCS eye subscore is 4  GCS verbal subscore is 5  GCS motor subscore is 6  Skin: Skin is warm  Psychiatric: His mood appears anxious  His affect is not angry, not blunt and not labile  His speech is not rapid and/or pressured, not tangential and not slurred  He is not actively hallucinating  He does not exhibit a depressed mood  He is communicative  He is attentive         Recent Results (from the past 24 hour(s))   Fingerstick Glucose (POCT)    Collection Time: 12/23/19  7:59 AM   Result Value Ref Range    POC Glucose 89 65 - 140 mg/dl   Fingerstick Glucose (POCT)    Collection Time: 12/23/19 11:31 AM   Result Value Ref Range    POC Glucose 129 65 - 140 mg/dl   Fingerstick Glucose (POCT)    Collection Time: 12/23/19  4:20 PM   Result Value Ref Range    POC Glucose 112 65 - 140 mg/dl   Fingerstick Glucose (POCT)    Collection Time: 12/23/19  9:18 PM   Result Value Ref Range    POC Glucose 111 65 - 140 mg/dl   Basic metabolic panel    Collection Time: 12/24/19  5:02 AM   Result Value Ref Range    Sodium 136 136 - 145 mmol/L    Potassium 3 7 3 5 - 5 3 mmol/L    Chloride 97 (L) 100 - 108 mmol/L    CO2 29 21 - 32 mmol/L ANION GAP 10 4 - 13 mmol/L    BUN 14 5 - 25 mg/dL    Creatinine 0 97 0 60 - 1 30 mg/dL    Glucose 103 65 - 140 mg/dL    Calcium 8 4 8 3 - 10 1 mg/dL    eGFR 86 ml/min/1 73sq m   Magnesium    Collection Time: 12/24/19  5:02 AM   Result Value Ref Range    Magnesium 1 2 (L) 1 6 - 2 6 mg/dL   Phosphorus    Collection Time: 12/24/19  5:02 AM   Result Value Ref Range    Phosphorus 1 9 (L) 2 7 - 4 5 mg/dL   CBC and differential    Collection Time: 12/24/19  5:02 AM   Result Value Ref Range    WBC 7 14 4 31 - 10 16 Thousand/uL    RBC 3 89 3 88 - 5 62 Million/uL    Hemoglobin 13 1 12 0 - 17 0 g/dL    Hematocrit 37 7 36 5 - 49 3 %    MCV 97 82 - 98 fL    MCH 33 7 26 8 - 34 3 pg    MCHC 34 7 31 4 - 37 4 g/dL    RDW 12 3 11 6 - 15 1 %    MPV 12 5 8 9 - 12 7 fL    Platelets 87 (L) 509 - 390 Thousands/uL    nRBC 0 /100 WBCs    Neutrophils Relative 65 43 - 75 %    Immat GRANS % 1 0 - 2 %    Lymphocytes Relative 15 14 - 44 %    Monocytes Relative 16 (H) 4 - 12 %    Eosinophils Relative 2 0 - 6 %    Basophils Relative 1 0 - 1 %    Neutrophils Absolute 4 72 1 85 - 7 62 Thousands/µL    Immature Grans Absolute 0 04 0 00 - 0 20 Thousand/uL    Lymphocytes Absolute 1 10 0 60 - 4 47 Thousands/µL    Monocytes Absolute 1 12 0 17 - 1 22 Thousand/µL    Eosinophils Absolute 0 11 0 00 - 0 61 Thousand/µL    Basophils Absolute 0 05 0 00 - 0 10 Thousands/µL       Current Facility-Administered Medications   Medication Dose Route Frequency Provider Last Rate Last Dose    amLODIPine (NORVASC) tablet 5 mg  5 mg Oral Daily [facility], TANK   5 mg at 12/23/19 0854    atorvastatin (LIPITOR) tablet 40 mg  40 mg Oral Daily With [employer], PA-C   40 mg at 12/23/19 1621    clopidogrel (PLAVIX) tablet 75 mg  75 mg Oral Daily [facility], PA-C   75 mg at 12/23/19 0854    fluticasone-vilanterol (BREO ELLIPTA) 200-25 MCG/INH inhaler 1 puff  1 puff Inhalation Daily TANK [place]   1 puff at 87/12/41 0127    folic acid (FOLVITE) tablet 1 mg  1 mg Oral Daily Sobeida Roblero PA-C   1 mg at 12/23/19 7004    heparin (porcine) subcutaneous injection 5,000 Units  5,000 Units Subcutaneous [facility] Sobeida Roblero PA-C   5,000 Units at 12/24/19 0551    insulin lispro (HumaLOG) 100 units/mL subcutaneous injection 1-5 Units  1-5 Units Subcutaneous TID [facility] Sobeida Roblero PA-C        insulin lispro (HumaLOG) 100 units/mL subcutaneous injection 1-5 Units  1-5 Units Subcutaneous HS Sobeida Roblero PA-C        ipratropium-albuterol (DUO-NEB) 0 5-2 5 mg/3 mL inhalation solution 3 mL  3 mL Nebulization TID Sobeida Roblero PA-C   3 mL at 12/23/19 1918    magnesium sulfate 4 g/100 mL IVPB (premix) 4 g  4 g Intravenous Once Menara, DO        metoprolol succinate (TOPROL-XL) 24 hr tablet 50 mg  50 mg Oral Daily Sobeida Roblero PA-C   50 mg at 12/23/19 0854    multivitamin-minerals (CENTRUM) tablet 1 tablet  1 tablet Oral Daily Sobeida Roblero PA-C   1 tablet at 12/23/19 0854    nicotine (NICODERM CQ) 21 mg/24 hr TD 24 hr patch 21 mg  21 mg Transdermal Daily Damian Sheppard Andrade, DO        ondansetron (ZOFRAN) injection 4 mg  4 mg Intravenous Q8H PRN Sobeida Roblero PA-C        pantoprazole (PROTONIX) EC tablet 40 mg  40 mg Oral Early Morning Sobeida Roblero PA-C   40 mg at 12/24/19 0551    ranolazine (RANEXA) 12 hr tablet 500 mg  500 mg Oral BID Sobeida Roblero PA-C   500 mg at 12/23/19 2104    sertraline (ZOLOFT) tablet 100 mg  100 mg Oral Daily Sobeida Roblero PA-C   100 mg at 12/23/19 0854    tamsulosin (FLOMAX) capsule 0 4 mg  0 4 mg Oral Daily Sobeida Roblero PA-C   0 4 mg at 12/23/19 2803    thiamine (VITAMIN B1) tablet 100 mg  100 mg Oral Daily Sobeida Roblero PA-C   100 mg at 12/23/19 1831       Invasive Devices     Peripheral Intravenous Line            Peripheral IV 12/22/19 Right Antecubital 2 days    Peripheral IV 12/22/19 Right Forearm 2 days                Lab, Imaging and other studies: I have personally reviewed pertinent reports  Ramila Mosqueda MD 7:01 AM  12/24/19

## 2019-12-24 NOTE — PLAN OF CARE
Problem: Potential for Falls  Goal: Patient will remain free of falls  Description  INTERVENTIONS:  - Assess patient frequently for physical needs  -  Identify cognitive and physical deficits and behaviors that affect risk of falls    -  [place] fall precautions as indicated by assessment   - Educate patient/family on patient safety including physical limitations  - Instruct patient to call for assistance with activity based on assessment  - Modify environment to reduce risk of injury  - Consider OT/PT consult to assist with strengthening/mobility  Outcome: Progressing     Problem: METABOLIC, FLUID AND ELECTROLYTES - ADULT  Goal: Electrolytes maintained within normal limits  Description  INTERVENTIONS:  - Monitor labs and assess patient for signs and symptoms of electrolyte imbalances  - Administer electrolyte replacement as ordered  - Monitor response to electrolyte replacements, including repeat lab results as appropriate  - Instruct patient on fluid and nutrition as appropriate  Outcome: Progressing  Goal: Fluid balance maintained  Description  INTERVENTIONS:  - Monitor labs   - Monitor I/O and WT  - Instruct patient on fluid and nutrition as appropriate  - Assess for signs & symptoms of volume excess or deficit  Outcome: Progressing     Problem: CARDIOVASCULAR - ADULT  Goal: Maintains optimal cardiac output and hemodynamic stability  Description  INTERVENTIONS:  - Monitor I/O, vital signs and rhythm  - Monitor for S/S and trends of decreased cardiac output  - Administer and titrate ordered vasoactive medications to optimize hemodynamic stability  - Assess quality of pulses, skin color and temperature  - Assess for signs of decreased coronary artery perfusion  - Instruct patient to report change in severity of symptoms  Outcome: Progressing  Goal: Absence of cardiac dysrhythmias or at baseline rhythm  Description  INTERVENTIONS:  - Continuous cardiac monitoring, vital signs, obtain 12 lead EKG if ordered  - Administer antiarrhythmic and heart rate control medications as ordered  - Monitor electrolytes and administer replacement therapy as ordered  Outcome: Progressing     Problem: SAFETY ADULT  Goal: Patient will remain free of falls  Description  INTERVENTIONS:  - Assess patient frequently for physical needs  -  Identify cognitive and physical deficits and behaviors that affect risk of falls    -  [place] fall precautions as indicated by assessment   - Educate patient/family on patient safety including physical limitations  - Instruct patient to call for assistance with activity based on assessment  - Modify environment to reduce risk of injury  - Consider OT/PT consult to assist with strengthening/mobility  Outcome: Progressing  Goal: Maintain or return to baseline ADL function  Description  INTERVENTIONS:  -  Assess patient's ability to carry out ADLs; assess patient's baseline for ADL function and identify physical deficits which impact ability to perform ADLs (bathing, care of mouth/teeth, toileting, grooming, dressing, etc )  - Assess/evaluate cause of self-care deficits   - Assess range of motion  - Assess patient's mobility; develop plan if impaired  - Assess patient's need for assistive devices and provide as appropriate  - Encourage maximum independence but intervene and supervise when necessary  - Involve family in performance of ADLs  - Assess for home care needs following discharge   - Consider OT consult to assist with ADL evaluation and planning for discharge  - Provide patient education as appropriate  Outcome: Progressing  Goal: Maintain or return mobility status to optimal level  Description  INTERVENTIONS:  - Assess patient's baseline mobility status (ambulation, transfers, stairs, etc )    - Identify cognitive and physical deficits and behaviors that affect mobility  - Identify mobility aids required to assist with transfers and/or ambulation (gait belt, sit-to-stand, lift, walker, cane, etc )  - [place] fall precautions as indicated by assessment  - Record patient progress and toleration of activity level on Mobility SBAR; progress patient to next Phase/Stage  - Instruct patient to call for assistance with activity based on assessment  - Consider rehabilitation consult to assist with strengthening/weightbearing, etc   Outcome: Progressing     Problem: Knowledge Deficit  Goal: Patient/family/caregiver demonstrates understanding of disease process, treatment plan, medications, and discharge instructions  Description  Complete learning assessment and assess knowledge base    Interventions:  - Provide teaching at level of understanding  - Provide teaching via preferred learning methods  Outcome: Progressing     Problem: RESPIRATORY - ADULT  Goal: Achieves optimal ventilation and oxygenation  Description  INTERVENTIONS:  - Assess for changes in respiratory status  - Assess for changes in mentation and behavior  - Position to facilitate oxygenation and minimize respiratory effort  - Oxygen administered by appropriate delivery if ordered  - Initiate smoking cessation education as indicated  - Encourage broncho-pulmonary hygiene including cough, deep breathe, Incentive Spirometry  - Assess the need for suctioning and aspirate as needed  - Assess and instruct to report SOB or any respiratory difficulty  - Respiratory Therapy support as indicated  Outcome: Progressing

## 2019-12-25 VITALS
DIASTOLIC BLOOD PRESSURE: 94 MMHG | OXYGEN SATURATION: 92 % | TEMPERATURE: 98.3 F | WEIGHT: 194.67 LBS | RESPIRATION RATE: 18 BRPM | HEART RATE: 84 BPM | BODY MASS INDEX: 24.98 KG/M2 | HEIGHT: 74 IN | SYSTOLIC BLOOD PRESSURE: 152 MMHG

## 2019-12-25 PROBLEM — E86.0 DEHYDRATION: Status: RESOLVED | Noted: 2019-12-23 | Resolved: 2019-12-25

## 2019-12-25 PROBLEM — E87.1 HYPONATREMIA: Status: RESOLVED | Noted: 2019-10-17 | Resolved: 2019-12-25

## 2019-12-25 PROBLEM — N18.9 ACUTE KIDNEY INJURY SUPERIMPOSED ON CKD (HCC): Status: RESOLVED | Noted: 2019-06-07 | Resolved: 2019-12-25

## 2019-12-25 PROBLEM — R19.7 DIARRHEA: Status: RESOLVED | Noted: 2019-12-22 | Resolved: 2019-12-25

## 2019-12-25 PROBLEM — N17.9 ACUTE KIDNEY INJURY SUPERIMPOSED ON CKD (HCC): Status: RESOLVED | Noted: 2019-06-07 | Resolved: 2019-12-25

## 2019-12-25 LAB
ANION GAP SERPL CALCULATED.3IONS-SCNC: 10 MMOL/L (ref 4–13)
ANION GAP SERPL CALCULATED.3IONS-SCNC: 11 MMOL/L (ref 4–13)
BASOPHILS # BLD AUTO: 0.04 THOUSANDS/ΜL (ref 0–0.1)
BASOPHILS NFR BLD AUTO: 1 % (ref 0–1)
BUN SERPL-MCNC: 15 MG/DL (ref 5–25)
BUN SERPL-MCNC: 17 MG/DL (ref 5–25)
CALCIUM SERPL-MCNC: 8.2 MG/DL (ref 8.3–10.1)
CALCIUM SERPL-MCNC: 8.9 MG/DL (ref 8.3–10.1)
CHLORIDE SERPL-SCNC: 97 MMOL/L (ref 100–108)
CHLORIDE SERPL-SCNC: 98 MMOL/L (ref 100–108)
CO2 SERPL-SCNC: 26 MMOL/L (ref 21–32)
CO2 SERPL-SCNC: 28 MMOL/L (ref 21–32)
CREAT SERPL-MCNC: 0.91 MG/DL (ref 0.6–1.3)
CREAT SERPL-MCNC: 0.95 MG/DL (ref 0.6–1.3)
EOSINOPHIL # BLD AUTO: 0.22 THOUSAND/ΜL (ref 0–0.61)
EOSINOPHIL NFR BLD AUTO: 5 % (ref 0–6)
ERYTHROCYTE [DISTWIDTH] IN BLOOD BY AUTOMATED COUNT: 11.9 % (ref 11.6–15.1)
GFR SERPL CREATININE-BSD FRML MDRD: 88 ML/MIN/1.73SQ M
GFR SERPL CREATININE-BSD FRML MDRD: 93 ML/MIN/1.73SQ M
GLUCOSE SERPL-MCNC: 102 MG/DL (ref 65–140)
GLUCOSE SERPL-MCNC: 160 MG/DL (ref 65–140)
GLUCOSE SERPL-MCNC: 95 MG/DL (ref 65–140)
GLUCOSE SERPL-MCNC: 99 MG/DL (ref 65–140)
GLUCOSE SERPL-MCNC: 99 MG/DL (ref 65–140)
HCT VFR BLD AUTO: 38.4 % (ref 36.5–49.3)
HGB BLD-MCNC: 13.2 G/DL (ref 12–17)
IMM GRANULOCYTES # BLD AUTO: 0.03 THOUSAND/UL (ref 0–0.2)
IMM GRANULOCYTES NFR BLD AUTO: 1 % (ref 0–2)
LYMPHOCYTES # BLD AUTO: 1.27 THOUSANDS/ΜL (ref 0.6–4.47)
LYMPHOCYTES NFR BLD AUTO: 27 % (ref 14–44)
MAGNESIUM SERPL-MCNC: 1.4 MG/DL (ref 1.6–2.6)
MAGNESIUM SERPL-MCNC: 1.4 MG/DL (ref 1.6–2.6)
MCH RBC QN AUTO: 33 PG (ref 26.8–34.3)
MCHC RBC AUTO-ENTMCNC: 34.4 G/DL (ref 31.4–37.4)
MCV RBC AUTO: 96 FL (ref 82–98)
MONOCYTES # BLD AUTO: 1.09 THOUSAND/ΜL (ref 0.17–1.22)
MONOCYTES NFR BLD AUTO: 23 % (ref 4–12)
NEUTROPHILS # BLD AUTO: 2.07 THOUSANDS/ΜL (ref 1.85–7.62)
NEUTS SEG NFR BLD AUTO: 43 % (ref 43–75)
NRBC BLD AUTO-RTO: 0 /100 WBCS
PHOSPHATE SERPL-MCNC: 2.8 MG/DL (ref 2.7–4.5)
PLATELET # BLD AUTO: 113 THOUSANDS/UL (ref 149–390)
PMV BLD AUTO: 11.9 FL (ref 8.9–12.7)
POTASSIUM SERPL-SCNC: 3 MMOL/L (ref 3.5–5.3)
POTASSIUM SERPL-SCNC: 4.3 MMOL/L (ref 3.5–5.3)
RBC # BLD AUTO: 4 MILLION/UL (ref 3.88–5.62)
SODIUM SERPL-SCNC: 133 MMOL/L (ref 136–145)
SODIUM SERPL-SCNC: 137 MMOL/L (ref 136–145)
WBC # BLD AUTO: 4.72 THOUSAND/UL (ref 4.31–10.16)

## 2019-12-25 PROCEDURE — 85025 COMPLETE CBC W/AUTO DIFF WBC: CPT | Performed by: STUDENT IN AN ORGANIZED HEALTH CARE EDUCATION/TRAINING PROGRAM

## 2019-12-25 PROCEDURE — NC001 PR NO CHARGE: Performed by: FAMILY MEDICINE

## 2019-12-25 PROCEDURE — 80048 BASIC METABOLIC PNL TOTAL CA: CPT | Performed by: STUDENT IN AN ORGANIZED HEALTH CARE EDUCATION/TRAINING PROGRAM

## 2019-12-25 PROCEDURE — 94760 N-INVAS EAR/PLS OXIMETRY 1: CPT

## 2019-12-25 PROCEDURE — 83735 ASSAY OF MAGNESIUM: CPT | Performed by: STUDENT IN AN ORGANIZED HEALTH CARE EDUCATION/TRAINING PROGRAM

## 2019-12-25 PROCEDURE — 99239 HOSP IP/OBS DSCHRG MGMT >30: CPT | Performed by: FAMILY MEDICINE

## 2019-12-25 PROCEDURE — 84100 ASSAY OF PHOSPHORUS: CPT | Performed by: STUDENT IN AN ORGANIZED HEALTH CARE EDUCATION/TRAINING PROGRAM

## 2019-12-25 PROCEDURE — 94640 AIRWAY INHALATION TREATMENT: CPT

## 2019-12-25 PROCEDURE — 82948 REAGENT STRIP/BLOOD GLUCOSE: CPT

## 2019-12-25 RX ORDER — LANOLIN ALCOHOL/MO/W.PET/CERES
100 CREAM (GRAM) TOPICAL DAILY
Qty: 30 TABLET | Refills: 1 | Status: SHIPPED | OUTPATIENT
Start: 2019-12-26 | End: 2020-08-10 | Stop reason: ALTCHOICE

## 2019-12-25 RX ORDER — MAGNESIUM SULFATE HEPTAHYDRATE 40 MG/ML
2 INJECTION, SOLUTION INTRAVENOUS ONCE
Status: COMPLETED | OUTPATIENT
Start: 2019-12-25 | End: 2019-12-25

## 2019-12-25 RX ORDER — FOSINOPRIL SODIUM 10 MG/1
10 TABLET ORAL DAILY
Qty: 30 TABLET | Refills: 0 | Status: SHIPPED | OUTPATIENT
Start: 2019-12-25 | End: 2020-03-04 | Stop reason: SDUPTHER

## 2019-12-25 RX ORDER — POTASSIUM CHLORIDE 20 MEQ/1
40 TABLET, EXTENDED RELEASE ORAL ONCE
Status: COMPLETED | OUTPATIENT
Start: 2019-12-25 | End: 2019-12-25

## 2019-12-25 RX ORDER — POTASSIUM CHLORIDE 14.9 MG/ML
20 INJECTION INTRAVENOUS ONCE
Status: DISCONTINUED | OUTPATIENT
Start: 2019-12-25 | End: 2019-12-25

## 2019-12-25 RX ORDER — POTASSIUM CHLORIDE 20 MEQ/1
20 TABLET, EXTENDED RELEASE ORAL ONCE
Status: COMPLETED | OUTPATIENT
Start: 2019-12-25 | End: 2019-12-25

## 2019-12-25 RX ORDER — POTASSIUM CHLORIDE 20 MEQ/1
40 TABLET, EXTENDED RELEASE ORAL ONCE
Status: DISCONTINUED | OUTPATIENT
Start: 2019-12-25 | End: 2019-12-25

## 2019-12-25 RX ORDER — NICOTINE 21 MG/24HR
1 PATCH, TRANSDERMAL 24 HOURS TRANSDERMAL DAILY
Qty: 28 PATCH | Refills: 0 | Status: SHIPPED | OUTPATIENT
Start: 2019-12-26 | End: 2020-08-10 | Stop reason: SDDI

## 2019-12-25 RX ORDER — FOLIC ACID 1 MG/1
1 TABLET ORAL DAILY
Qty: 30 TABLET | Refills: 1 | Status: SHIPPED | OUTPATIENT
Start: 2019-12-25 | End: 2020-08-10 | Stop reason: ALTCHOICE

## 2019-12-25 RX ADMIN — METOPROLOL SUCCINATE 50 MG: 50 TABLET, EXTENDED RELEASE ORAL at 08:04

## 2019-12-25 RX ADMIN — HEPARIN SODIUM 5000 UNITS: 5000 INJECTION INTRAVENOUS; SUBCUTANEOUS at 05:25

## 2019-12-25 RX ADMIN — IPRATROPIUM BROMIDE AND ALBUTEROL SULFATE 3 ML: 2.5; .5 SOLUTION RESPIRATORY (INHALATION) at 07:18

## 2019-12-25 RX ADMIN — IPRATROPIUM BROMIDE AND ALBUTEROL SULFATE 3 ML: 2.5; .5 SOLUTION RESPIRATORY (INHALATION) at 13:05

## 2019-12-25 RX ADMIN — Medication 1 TABLET: at 08:04

## 2019-12-25 RX ADMIN — RANOLAZINE 500 MG: 500 TABLET, FILM COATED, EXTENDED RELEASE ORAL at 08:04

## 2019-12-25 RX ADMIN — NICOTINE 21 MG: 21 PATCH TRANSDERMAL at 08:05

## 2019-12-25 RX ADMIN — FLUTICASONE FUROATE AND VILANTEROL TRIFENATATE 1 PUFF: 200; 25 POWDER RESPIRATORY (INHALATION) at 08:03

## 2019-12-25 RX ADMIN — POTASSIUM CHLORIDE 40 MEQ: 1500 TABLET, EXTENDED RELEASE ORAL at 10:30

## 2019-12-25 RX ADMIN — HEPARIN SODIUM 5000 UNITS: 5000 INJECTION INTRAVENOUS; SUBCUTANEOUS at 14:48

## 2019-12-25 RX ADMIN — PANTOPRAZOLE SODIUM 40 MG: 40 TABLET, DELAYED RELEASE ORAL at 05:25

## 2019-12-25 RX ADMIN — SERTRALINE HYDROCHLORIDE 100 MG: 100 TABLET ORAL at 08:04

## 2019-12-25 RX ADMIN — CLOPIDOGREL BISULFATE 75 MG: 75 TABLET ORAL at 08:04

## 2019-12-25 RX ADMIN — Medication 100 MG: at 08:04

## 2019-12-25 RX ADMIN — POTASSIUM CHLORIDE 20 MEQ: 1500 TABLET, EXTENDED RELEASE ORAL at 11:20

## 2019-12-25 RX ADMIN — INSULIN LISPRO 1 UNITS: 100 INJECTION, SOLUTION INTRAVENOUS; SUBCUTANEOUS at 11:21

## 2019-12-25 RX ADMIN — MAGNESIUM SULFATE HEPTAHYDRATE 2 G: 40 INJECTION, SOLUTION INTRAVENOUS at 08:16

## 2019-12-25 RX ADMIN — ATORVASTATIN CALCIUM 40 MG: 40 TABLET, FILM COATED ORAL at 16:30

## 2019-12-25 RX ADMIN — AMLODIPINE BESYLATE 5 MG: 5 TABLET ORAL at 08:04

## 2019-12-25 RX ADMIN — FOLIC ACID 1 MG: 1 TABLET ORAL at 08:04

## 2019-12-25 NOTE — ASSESSMENT & PLAN NOTE
· ENMA on CKD present on admission  Creatinine 2 2  Baseline appears to be 0 9-1 0   Down to 0 91 this am  · Prerenal secondary to dehydration vs ATN secondary to hypotension  · Creatinine has been as high as 3 on previous admissions  · Close I&O monitoring  · Daily weights  · Avoid nephrotoxins

## 2019-12-25 NOTE — ASSESSMENT & PLAN NOTE
Lab Results   Component Value Date    HGBA1C 5 4 11/23/2019       Recent Labs     12/24/19  1114 12/24/19  1614 12/24/19 2058 12/25/19  0719   POCGLU 156* 114 164* 95       Blood Sugar Average: Last 72 hrs:  (P) 703 [id number]     · Hold home metformin  · Continue SSI   · Recent A1c 5 4

## 2019-12-25 NOTE — DISCHARGE INSTRUCTIONS
Abuse of Alcohol   WHAT YOU NEED TO KNOW:   · Alcohol abuse is unhealthy drinking behavior  You may drink too much at one time once a week, or continue to drink too much daily  You continue to drink even though it causes problems  The problems can be alcohol related legal problems, or problems with work or relationships with family  · If you drink too much at one time, you are binge drinking  Binge drinking is when you have a large amount of alcohol in a short time  Your blood alcohol concentrations (TATIANA) goes above 0 08 g/dLlevel during binge drinking  For men, this usually happens with more than 4 drinks in 2 hours  For women, it is more than 3 drinks in 2 hours  A drink is 12 ounces of beer, 4 ounces of wine, or 1½ ounces of liquor  DISCHARGE INSTRUCTIONS:   Call 911 for any of the following:   · You have sudden chest pain or trouble breathing  · You have a seizure or have shaking or trembling  · You were in an accident because of alcohol  Return to the emergency department if:   · You want to harm yourself or others  · You have hallucinations (you see or hear things that are not real)  · You cannot stop vomiting or you vomit blood  Contact your healthcare provider if:   · You need help to stop drinking alcohol  · You have questions or concerns about your condition or care  Medicines:   · Vitamin supplements  may be given to treat low vitamin levels  Alcohol can make it hard for your body to absorb enough vitamins such as B1  Vitamin supplements may also be given to prevent alcohol related brain damage  · Take your medicine as directed  Contact your healthcare provider if you think your medicine is not helping or if you have side effects  Tell him or her if you are allergic to any medicine  Keep a list of the medicines, vitamins, and herbs you take  Include the amounts, and when and why you take them  Bring the list or the pill bottles to follow-up visits   Carry your medicine list with you in case of an emergency  Treatments or therapies you may need:   · Detoxification (detox) and withdrawal  is a program that helps you to safely get alcohol out of your body  Detox can also help get rid of the physical need to drink  Healthcare providers monitor the physical symptoms of withdrawal  They may give you medicines to help decrease nausea, dehydration, and seizures  Healthcare providers will also monitor your blood pressure, heart and breathing rates, and your temperature  Symptoms of anxiety, depression, and suicidal thoughts are also monitored and managed during detox  Healthcare providers may give you medicines for these symptoms and therapy sessions will be available to you  Detox is usually done at a detox center or in a hospital  Healthcare providers do not recommend that you try to detox at home or by yourself  Withdrawal symptoms may become life-threatening  The center can help you find 12 step programs or an individual therapist to help with emotional support after detox  · Inpatient and outpatient treatment  focus on your personal needs to help you stop drinking  Treatment helps you understand the reasons you abuse alcohol  Counselors and therapists provide you with support and help you find ways to cope instead of drinking  You may need inpatient treatment to provide a controlled environment  You may need outpatient treatment after your inpatient treatment is complete  · Alcohol aversion therapy  takes away the desire to drink by causing a negative reaction when you drink  Healthcare providers may give you medicines that cause nausea and vomiting when you drink alcohol  They may instead give you a medicine that decreases your urge to drink alcohol  These medicines are used to help you stop drinking or reduce the amount you drink  They can also help you avoid relapse    Follow up with your healthcare provider as directed:  Write down your questions so you remember to ask them during your visits  Avoid alcohol:  You should stop drinking entirely  Alcohol can damage your brain, heart, and liver  It also increases your risk for injury, high blood pressure, and certain types of cancer  Alcohol is dangerous when you combine it with certain medicines  Do not drive if you have had alcohol:  Make sure someone who has not been drinking can help you get home  Get support:  Most people need support to stop drinking alcohol  Mental health providers, support groups, rehabilitation centers, and your healthcare provider can provide support  For more information:   · Alcoholics Anonymous  Web Address: http://www eCullet/  · Substance Abuse and [place] [address] , [address] [place]  Web Address: https://[employer]EX/  © 2017 [address] [employer] Information is for End User's use only and may not be sold, redistributed or otherwise used for commercial purposes  All illustrations and images included in CareNotes® are the copyrighted property of A D A M , Inc  or Mick Annia  The above information is an  only  It is not intended as medical advice for individual conditions or treatments  Talk to your doctor, nurse or pharmacist before following any medical regimen to see if it is safe and effective for you  Alcohol Withdrawal   WHAT YOU NEED TO KNOW:   Alcohol withdrawal is a group of symptoms that occur when you drink alcohol daily and suddenly stop drinking  It can begin within 5 hours of your last drink and gets worse over 2 to 3 days  Withdrawal may also happen if you suddenly reduce the amount of alcohol that you normally drink  DISCHARGE INSTRUCTIONS:   Call 911 for any of the following:   · You feel like you want to harm yourself or others  Return to the emergency department if:   · You have sudden chest pain or trouble breathing      · Your breathing or heartbeat is faster than usual     · You pass out or think you had a seizure  · You are confused, hallucinating, or extremely agitated  · You cannot stop vomiting, or you vomit blood  · You are shaking and it does not get better after you take your medicine  Contact your healthcare provider if:   · You keep drinking to avoid alcohol withdrawal symptoms  · You need help to stop drinking alcohol  · You have trouble with work, relationships, or school because you drink too much alcohol  · You get into fights because of alcohol  · You have questions or concerns about your condition or care  Medicines:   · Medicines  may be given to calm you and help manage your symptoms  Vitamin supplements, such as thiamine, may be recommended because high alcohol intake can keep your body from absorbing enough vitamins from food  · Take your medicine as directed  Contact your healthcare provider if you think your medicine is not helping or if you have side effects  Tell him of her if you are allergic to any medicine  Keep a list of the medicines, vitamins, and herbs you take  Include the amounts, and when and why you take them  Bring the list or the pill bottles to follow-up visits  Carry your medicine list with you in case of an emergency  Have someone stay with you during withdrawal:  This person should help you take your medicine and keep you in a calm, quiet environment  He should also watch your symptoms and know what to do if your symptoms get worse  Follow up with your healthcare provider within 1 day:  Write down your questions so you remember to ask them during your visits  Learn to stop drinking alcohol safely:  Work with your healthcare provider to develop a plan for you to stop drinking safely  A sudden stop or change can be life-threatening     For support and more information:   · Alcoholics Anonymous  Web Address: http://www abbott info/  © 2017 [address] [employer] Information is for End User's use only and may not be sold, redistributed or otherwise used for commercial purposes  All illustrations and images included in CareNotes® are the copyrighted property of A D A M , Inc  or Mick Milner  The above information is an  only  It is not intended as medical advice for individual conditions or treatments  Talk to your doctor, nurse or pharmacist before following any medical regimen to see if it is safe and effective for you

## 2019-12-25 NOTE — ASSESSMENT & PLAN NOTE
Lab Results   Component Value Date    HGBA1C 5 4 11/23/2019       Recent Labs     12/24/19  1114 12/24/19  1614 12/24/19 2058 12/25/19  0719   POCGLU 156* 114 164* 95       Blood Sugar Average: Last 72 hrs:  (P) 612 [id number]     · Hold home metformin  · Continue ISS   · Recent A1c 5 4

## 2019-12-25 NOTE — ASSESSMENT & PLAN NOTE
· Platelets 72 on admission, 113 today  · ?  Secondary to chronic alcohol use  · Baseline platelets in the low 100s   · Continue to monitor closely

## 2019-12-25 NOTE — ASSESSMENT & PLAN NOTE
· Restarted Norvasc and metoprolol  · Hold fosinopril due to ENMA, will continue on discharge  · Continue to monitor vital signs

## 2019-12-25 NOTE — ASSESSMENT & PLAN NOTE
· 2g Mag Sulfate IV ordered today for level 1 4  · Continue to monitor and replace for goal >2  · Sent prescription for Magox 400 mg qd

## 2019-12-25 NOTE — PLAN OF CARE
Problem: Potential for Falls  Goal: Patient will remain free of falls  Description  INTERVENTIONS:  - Assess patient frequently for physical needs  -  Identify cognitive and physical deficits and behaviors that affect risk of falls    -  [place] fall precautions as indicated by assessment   - Educate patient/family on patient safety including physical limitations  - Instruct patient to call for assistance with activity based on assessment  - Modify environment to reduce risk of injury  - Consider OT/PT consult to assist with strengthening/mobility  Outcome: Progressing     Problem: METABOLIC, FLUID AND ELECTROLYTES - ADULT  Goal: Electrolytes maintained within normal limits  Description  INTERVENTIONS:  - Monitor labs and assess patient for signs and symptoms of electrolyte imbalances  - Administer electrolyte replacement as ordered  - Monitor response to electrolyte replacements, including repeat lab results as appropriate  - Instruct patient on fluid and nutrition as appropriate  Outcome: Progressing  Goal: Fluid balance maintained  Description  INTERVENTIONS:  - Monitor labs   - Monitor I/O and WT  - Instruct patient on fluid and nutrition as appropriate  - Assess for signs & symptoms of volume excess or deficit  Outcome: Progressing     Problem: CARDIOVASCULAR - ADULT  Goal: Maintains optimal cardiac output and hemodynamic stability  Description  INTERVENTIONS:  - Monitor I/O, vital signs and rhythm  - Monitor for S/S and trends of decreased cardiac output  - Administer and titrate ordered vasoactive medications to optimize hemodynamic stability  - Assess quality of pulses, skin color and temperature  - Assess for signs of decreased coronary artery perfusion  - Instruct patient to report change in severity of symptoms  Outcome: Progressing  Goal: Absence of cardiac dysrhythmias or at baseline rhythm  Description  INTERVENTIONS:  - Continuous cardiac monitoring, vital signs, obtain 12 lead EKG if ordered  - Administer antiarrhythmic and heart rate control medications as ordered  - Monitor electrolytes and administer replacement therapy as ordered  Outcome: Progressing     Problem: SAFETY ADULT  Goal: Patient will remain free of falls  Description  INTERVENTIONS:  - Assess patient frequently for physical needs  -  Identify cognitive and physical deficits and behaviors that affect risk of falls    -  [place] fall precautions as indicated by assessment   - Educate patient/family on patient safety including physical limitations  - Instruct patient to call for assistance with activity based on assessment  - Modify environment to reduce risk of injury  - Consider OT/PT consult to assist with strengthening/mobility  Outcome: Progressing  Goal: Maintain or return to baseline ADL function  Description  INTERVENTIONS:  -  Assess patient's ability to carry out ADLs; assess patient's baseline for ADL function and identify physical deficits which impact ability to perform ADLs (bathing, care of mouth/teeth, toileting, grooming, dressing, etc )  - Assess/evaluate cause of self-care deficits   - Assess range of motion  - Assess patient's mobility; develop plan if impaired  - Assess patient's need for assistive devices and provide as appropriate  - Encourage maximum independence but intervene and supervise when necessary  - Involve family in performance of ADLs  - Assess for home care needs following discharge   - Consider OT consult to assist with ADL evaluation and planning for discharge  - Provide patient education as appropriate  Outcome: Progressing  Goal: Maintain or return mobility status to optimal level  Description  INTERVENTIONS:  - Assess patient's baseline mobility status (ambulation, transfers, stairs, etc )    - Identify cognitive and physical deficits and behaviors that affect mobility  - Identify mobility aids required to assist with transfers and/or ambulation (gait belt, sit-to-stand, lift, walker, cane, etc )  - [employer] fall precautions as indicated by assessment  - Record patient progress and toleration of activity level on Mobility SBAR; progress patient to next Phase/Stage  - Instruct patient to call for assistance with activity based on assessment  - Consider rehabilitation consult to assist with strengthening/weightbearing, etc   Outcome: Progressing     Problem: Knowledge Deficit  Goal: Patient/family/caregiver demonstrates understanding of disease process, treatment plan, medications, and discharge instructions  Description  Complete learning assessment and assess knowledge base    Interventions:  - Provide teaching at level of understanding  - Provide teaching via preferred learning methods  Outcome: Progressing     Problem: RESPIRATORY - ADULT  Goal: Achieves optimal ventilation and oxygenation  Description  INTERVENTIONS:  - Assess for changes in respiratory status  - Assess for changes in mentation and behavior  - Position to facilitate oxygenation and minimize respiratory effort  - Oxygen administered by appropriate delivery if ordered  - Initiate smoking cessation education as indicated  - Encourage broncho-pulmonary hygiene including cough, deep breathe, Incentive Spirometry  - Assess the need for suctioning and aspirate as needed  - Assess and instruct to report SOB or any respiratory difficulty  - Respiratory Therapy support as indicated  Outcome: Progressing

## 2019-12-25 NOTE — ASSESSMENT & PLAN NOTE
Wt Readings from Last 3 Encounters:   12/25/19 88 3 kg (194 lb 10 7 oz)   11/22/19 88 7 kg (195 lb 8 8 oz)   10/28/19 90 kg (198 lb 6 4 oz)     · Echo on 6/8/19 shows EF 50-55% with grade II diastolic dysfunction]  · BNP mildly elevated on admission  · CT scan without signs of pulmonary edema  · Monitor volume status closely after fluid resuscitation   · Close I&Os: remained negative on every shift since admission  · Daily weights

## 2019-12-25 NOTE — DISCHARGE SUMMARY
[facility] Discharge Summary - Medical Fidelina Gonzalez 64 y o  male MRN: [id number]    [address]  Aw[address] 19 / Bed: [address] [facility] [place] Encounter: [id number]    BRIEF OVERVIEW  Admitting Provider: Komal Manzanares MD  Discharge Provider: Bryn Rose MD    Discharge To: home, self care    Outpatient Follow-Up:   [facility]  Things to address at first follow up visit:  · Has patient remained alcohol free since admission? · Has patient continued taking oral supplements such as thiamine and magnesium oxide? · Progress is smoking cessation, patient was discharged with nicotine patch 21 mg  · Does patient require assistance in process of alcohol cessation? · Consider repeating BMP as patient had ENMA on admission  · During admission patient had complained of urinating too much, patient was on Flomax  Is patient taking or not taking Flomax at home? How his volume status? Labs and results pending at discharge:   None    Admission Date: 12/22/2019     Discharge Date: 12/25/2019    Primary Discharge Diagnosis  Principal Problem:    Alcohol intoxication ([address] )  Active Problems:    COPD (chronic obstructive pulmonary disease) ([address] )    Type 2 diabetes mellitus ([address] )    Essential (primary) hypertension    Hypomagnesemia    CAD (coronary artery disease)    Thrombocytopenia (HCC)    Diastolic heart failure ([address] )    Medical non-compliance  Resolved Problems:    Acute kidney injury superimposed on CKD ([address] )    Hyponatremia    Shock ([address] )    Diarrhea    Dehydration      DETAILS OF HOSPITAL STAY    Procedures Performed/Pertinent Test results  CT chest abdomen pelvis w contrast   Final Result      1  Subacute or chronic nondisplaced fractures of the right 8th and 9th ribs and left 8th rib  Otherwise, no acute findings in the chest, abdomen or pelvis  2   Colonic diverticulosis without evidence of acute diverticulitis  3   Hepatic steatosis           Workstation performed: [id number]         CT spine cervical without contrast   Final Result      No cervical spine fracture or traumatic malalignment  Workstation performed: [id number]         CT head without contrast   Final Result      No acute intracranial abnormality  Microangiopathic changes  Workstation performed: [id number]           12/25: WBC 4 72, Plt 113, Mg 1 14, K 3 0  12/24: WBC 5 34 , , PLTs 61 (-) C-diff  12/23: BMP nl, AST 62, Hb 12 4  12/22: CT chest/abd/pelvis:  Subacute or chronic nondisplaced fractures of right eighth and ninth ribs and left eighth rib  CT head and spine:  No fractures or malalignment  Procalcitonin normal, LA 2 9, Na 136  Ethanol 289 on admission   Creatinine 2 2 ->1 77>> 97    HPI (copied from H&P)  Susan Garcia is a 64 y o  male with past medical history of alcohol abuse, CAD s/p CABG in 2004 and PCI in 2014, CKD, COPD, GERD, hypertension, DMII, who presents after a reported fall at home  Per EMS, patient was found outside of his house  On arrival to the ER, patient was hypotensive with SBP in the 70s  Trauma scan negative for acute injuries but did show subacute right 8th and 9th and left 8th rib fractures  Laboratory workup notable for medical alcohol level 298, creatinine 2 2 (baseline 1 0-1 1), lactic acid of 2 9, sodium of 132  No leukocytosis, fever or other SIRs criteria present  Patient is alert and oriented x 3 but is a poor historian given current intoxication  He reports that he has been having diarrhea x 1 week  He states that he has been trying to cut down on his alcohol consumption but admits to drinking several beers over the last 2 days  He was given a total of 4L NSS with transient improvements in his blood pressure and was ultimately started on low dose levophed to maintain MAP >65  He will be admitted to the Step Down unit for further workup and monitoring       Of note per EMR review, patient has had multiple admissions secondary to intoxication and falls  Admission in June of this year, patient also presented hypotensive with SBP in the 60s  Outpatient note from 10/28 reports BP of 80/40  Patient reports compliance with prescribed medications and denies taking more than prescribed of antihypertensives  Hospital Course  Patient admitted to alcohol intoxication along with other derangements such as creatinine of 2 2, lactic acid 2 3, sodium of 279, systolic blood pressure in the 70s  Patient initially given 4 L of NS at ED, with only transient improvement in his blood pressure  Patient patient admitted to ICU, as he required to be on a low dose norepinephrine infusion  Overnight patient improved, was transferred to [facility] surg floor  Patient's condition clinically improved over the next few days  Primary care team spoke with patient, patient stated he had been drinking half pt of vodka daily for many years, but quit approximately 1 week prior to admission  Primary care team advised patient continue with therapy with behavioral health provider to aid in process alcohol cessation  Patient however declined  After metabolic derangements were managed and stabilized, patient was deemed stable to be discharged home  Highly advised patient to follow up with primary care provider to waiting process for alcohol cessation and smoking cessation as well  * Alcohol intoxication ([facility] )  Assessment & Plan  · Patient reports that he used to be a heavy drinker and primarily drank vodka until recently when he tried to cut back  This week he went several days without drinking as he was not feeling well but did report drinking several beers over the last few days  He states that he did feel withdrawal symptoms of chills, diaphoresis and diarrhea this week     · Medical alcohol level 298 on admission  · Cont thiamine/ folate/ MVI  · Phenobarb loaded to prevent withdrawal symptoms, only mild tremor noted otherwise CIWA stable  · Continue CIWA  · Aspiration/seizure precautions    Medical non-compliance  Assessment & Plan  · Patient has multiple pill bottles with him on admission with some of them not having been filled since July of this year   · Patient reports med compliance but does not know what medications he takes and is a poor historian     Diastolic heart failure ([facility] [address] )  Assessment & Plan  Wt Readings from Last 3 Encounters:   12/25/19 88 3 kg (194 lb 10 7 oz)   11/22/19 88 7 kg (195 lb 8 8 oz)   10/28/19 90 kg (198 lb 6 4 oz)     · Echo on 6/8/19 shows EF 50-55% with grade II diastolic dysfunction]  · BNP mildly elevated on admission  · CT scan without signs of pulmonary edema  · Monitor volume status closely after fluid resuscitation   · Close I&Os: remained negative on every shift since admission  · Daily weights        Thrombocytopenia (HCC)  Assessment & Plan  · Platelets 72 on admission, 113 today  · ?  Secondary to chronic alcohol use  · Baseline platelets in the low 100s   · Continue to monitor closely     CAD (coronary artery disease)  Assessment & Plan  · CAD s/p CABG in 2004, PCI in 2014   · Denies chest pain  · EKG without ST changes  · Troponin <0 02      Hypomagnesemia  Assessment & Plan  · 2g Mag Sulfate IV ordered today for level 1 4  · Continue to monitor and replace for goal >2  · Sent prescription for Magox 400 mg qd     Essential (primary) hypertension  Assessment & Plan  · Restarted Norvasc and metoprolol  · Hold fosinopril due to ENMA, will continue on discharge  · Continue to monitor vital signs      Type 2 diabetes mellitus [facility])  Assessment & Plan  Lab Results   Component Value Date    HGBA1C 5 4 11/23/2019       Recent Labs     12/24/19  1114 12/24/19  1614 12/24/19  2058 12/25/19  0719   POCGLU 156* 114 164* 95       Blood Sugar Average: Last 72 hrs:  (P) 275 [id number]     · Hold home metformin  · Continue ISS   · Recent A1c 5 4      COPD (chronic obstructive pulmonary disease) (HCC)  Assessment & Plan  · COPD not in acute exacerbation  · Reports smoking 1 5 PPD  · Not on continuous oxygen therapy   · Continue spiriva, advair (advair not on formulary, will give breo)  · Prescribed nicotine patch 21 mg        Dehydrationresolved as of 12/25/2019  Assessment & Plan  · Secondary to diarrhea  · Cont IVF but otherwise improved  · Diet as tolerated    Diarrhearesolved as of 12/25/2019  Assessment & Plan  · Patient reports diarrhea for the past week  Reports going to the bathroom 6-8 times daily  · CT scan shows diverticulosis without evidence of acute diverticulitis  · Digital rectal exam in the ER with brown stool but + for occult blood  · Diarrhea ? secondary to alcohol withdrawal as patient admits to recently cutting down on alcohol intake  · C  Diff and enteric stool panel negative  · No bowel movement since admission, continue to monitor  · Continue IV hydration    Hyponatremiaresolved as of 12/25/2019  Assessment & Plan  · Mild hyponatremia on admission with sodium 132  · Patient received 4L NSS in the ER   · This am up to 137      Acute kidney injury superimposed on CKD (HCC)resolved as of 12/25/2019  Assessment & Plan  · ENMA on CKD present on admission  Creatinine 2 2  Baseline appears to be 0 9-1 0   Down to 0 91 this am  · Prerenal secondary to dehydration vs ATN secondary to hypotension  · Creatinine has been as high as 3 on previous admissions  · Close I&O monitoring  · Daily weights  · Avoid nephrotoxins         Physical Exam at Discharge  Please see progress note from same day of discharge    Medications   Your Medications     TAKE these medications     TAKE these medications     Morning Afternoon Evening Bedtime As Needed    albuterol 90 mcg/act inhaler   Commonly known as: PROVENTIL HFA,VENTOLIN HFA   Inhale 2 puffs every 4 (four) hours as needed for wheezing   Refills: 0          amLODIPine 5 mg tablet   Commonly known as: NORVASC   Take 5 mg by mouth daily   Refills: 0   Next Dose Due: 12/26 9 AM clopidogrel 75 mg tablet   Commonly known as: PLAVIX   Take 1 tablet (75 mg total) by mouth daily   Refills: 0   Next Dose Due: 12/26 9 AM          esomeprazole 20 mg capsule   Commonly known as: NexIUM   Take 1 capsule (20 mg total) by mouth daily   Refills: 0   What changed:    0 medication strength   0 how much to take   0 how to take this   0 when to take this   Next Dose Due: 12/26 9 AM          famotidine 20 mg tablet   Commonly known as: PEPCID   Take 20 mg by mouth 2 (two) times a day   Refills: 0   Next Dose Due: 12/25 9 pm          fluticasone-salmeterol 500-50 mcg/dose inhaler   Commonly known as: ADVAIR, WIXELA   Inhale 1 puff every 12 (twelve) hours   Refills: 0   Next Dose Due: 12/25 9 PM          folic acid 1 mg tablet   Commonly known as: FOLVITE   Take 1 tablet (1 mg total) by mouth daily   Refills: 1   Next Dose Due: 12/26 9 AM          fosinopril 10 mg tablet   Commonly known as: MONOPRIL   Take 1 tablet (10 mg total) by mouth daily   Refills: 0          magnesium oxide 400 mg   Commonly known as: MAG-OX   Take 1 tablet (400 mg total) by mouth daily   Refills: 1   Next Dose Due: 12/26 9 AM          metFORMIN 1000 MG tablet   Commonly known as: GLUCOPHAGE   Take 1,000 mg by mouth 2 (two) times a day with meals   Refills: 0   Next Dose Due: 12/26 9 AM          metoprolol succinate 100 mg 24 hr tablet   Commonly known as: TOPROL-XL   Take 0 5 tablets (50 mg total) by mouth daily   Refills: 3   Next Dose Due: 12/26 9 AM          nicotine 21 mg/24 hr TD 24 hr patch   Commonly known as: Domingo Smoker   Start taking on: December 26, 2019   Place 1 patch on the skin daily   Refills: 0   Next Dose Due: 12/26 9 AM          ONE TOUCH ULTRA TEST test strip   Generic drug: glucose blood   3 (three) times a day Test   Refills: 0          ranolazine 500 mg 12 hr tablet   Commonly known as: RANEXA   Take 1 tablet (500 mg total) by mouth 2 (two) times a day   Refills: 0   Next Dose Due: 12/25 10 PM rosuvastatin 20 MG tablet   Commonly known as: CRESTOR   Take 1 tablet (20 mg total) by mouth daily   Refills: 0          sertraline 100 mg tablet   Commonly known as: ZOLOFT   Take 100 mg by mouth daily   Refills: 0   Next Dose Due: 12/26 9 AM          TAMSULOSIN HCL PO   Take 0 4 mg by mouth daily   Refills: 0   Next Dose Due: 12/26 9 AM          thiamine 100 MG tablet   Start taking on: December 26, 2019   Take 1 tablet (100 mg total) by mouth daily   Refills: 1   Next Dose Due: 12/26 9 AM          tiotropium 18 mcg inhalation capsule   Commonly known as: SPIRIVA   Place 18 mcg into inhaler and inhale daily   Refills: 0             Allergies  No Known Allergies    Diet restrictions: as tolerated per patient  Activity restrictions: as tolerated per patient  Code Status: Level 1 - Full Code  Advance Directive and Living Will: <no information>  Power of :    POLST:      Discharge Condition: stable      Discharge  Statement   I spent 45 minutes discharging the patient  This time was spent on the day of discharge  I had direct contact with the patient on the day of discharge  Additional documentation is required if more than 30 minutes were spent on discharge

## 2019-12-25 NOTE — NURSING NOTE
Pt left via ambulation  Pt left with home meds, IV removed prior to discharge  AVS reviewed with pt  [place] called for pt and pts number was given to [place], pt refused to stay and wait for the confirmation call  Pt stated he knows where the [facility]ft will pick him up and walked off the floor

## 2019-12-25 NOTE — ASSESSMENT & PLAN NOTE
Wt Readings from Last 3 Encounters:   12/25/19 88 3 kg (194 lb 10 7 oz)   11/22/19 88 7 kg (195 lb 8 8 oz)   10/28/19 90 kg (198 lb 6 4 oz)     · Echo on 6/8/19 shows EF 50-55% with grade II diastolic dysfunction]  · BNP mildly elevated on admission  · CT scan without signs of pulmonary edema  · Monitor volume status closely after fluid resuscitation   · Close I&Os  · Daily weights

## 2019-12-25 NOTE — ASSESSMENT & PLAN NOTE
· Mild hyponatremia on admission with sodium 132  · Patient received 4L NSS in the ER   · This am up to 137

## 2019-12-25 NOTE — ASSESSMENT & PLAN NOTE
· Patient reports diarrhea for the past week  Reports going to the bathroom 6-8 times daily  · CT scan shows diverticulosis without evidence of acute diverticulitis  · Digital rectal exam in the ER with brown stool but + for occult blood  · Diarrhea ? secondary to alcohol withdrawal as patient admits to recently cutting down on alcohol intake  · C   Diff and enteric stool panel negative  · No bowel movement since admission, continue to monitor  · Continue IV hydration

## 2019-12-25 NOTE — PROGRESS NOTES
[address] [facility] [address] [facility] Progress Note - Sonya Cao 64 y o  male MRN: [id number]    Unit/Bed#: 2 [place] [address] Encounter: [id number]      Assessment/Plan:  * Alcohol intoxication [facility])  Assessment & Plan  · Patient reports that he used to be a heavy drinker and primarily drank vodka until recently when he tried to cut back  This week he went several days without drinking as he was not feeling well but did report drinking several beers over the last few days  He states that he did feel withdrawal symptoms of chills, diaphoresis and diarrhea this week  · Medical alcohol level 298 on admission  · Cont thiamine/ folate/ MVI  · Phenobarb loaded to prevent withdrawal symptoms, only mild tremor noted otherwise CIWA stable  · Continue CIWA  · Aspiration/seizure precautions    Medical non-compliance  Assessment & Plan  · Patient has multiple pill bottles with him on admission with some of them not having been filled since July of this year   · Patient reports med compliance but does not know what medications he takes and is a poor historian     Diastolic heart failure ([facility] [address] )  Assessment & Plan  Wt Readings from Last 3 Encounters:   12/25/19 88 3 kg (194 lb 10 7 oz)   11/22/19 88 7 kg (195 lb 8 8 oz)   10/28/19 90 kg (198 lb 6 4 oz)     · Echo on 6/8/19 shows EF 50-55% with grade II diastolic dysfunction]  · BNP mildly elevated on admission  · CT scan without signs of pulmonary edema  · Monitor volume status closely after fluid resuscitation   · Close I&Os  · Daily weights        Thrombocytopenia (HCC)  Assessment & Plan  · Platelets 72 on admission, 113 today  · ?  Secondary to chronic alcohol use  · Baseline platelets in the low 100s   · Continue to monitor closely     CAD (coronary artery disease)  Assessment & Plan  · CAD s/p CABG in 2004, PCI in 2014   · Denies chest pain  · EKG without ST changes  · Troponin <0 02      Hypomagnesemia  Assessment & Plan  · 2g Mag Sulfate IV ordered today for level 1 4  · Continue to monitor and replace for goal >2    Essential (primary) hypertension  Assessment & Plan  · Restarted Norvasc and metoprolol  · Hold fosinopril due to ENMA  · Continue to monitor vital signs      Type 2 diabetes mellitus [facility])  Assessment & Plan  Lab Results   Component Value Date    HGBA1C 5 4 11/23/2019       Recent Labs     12/24/19  1114 12/24/19  1614 12/24/19  2058 12/25/19  0719   POCGLU 156* 114 164* 95       Blood Sugar Average: Last 72 hrs:  (P) 852 [id number]     · Hold home metformin  · Continue ISS   · Recent A1c 5 4      COPD (chronic obstructive pulmonary disease) ([facility])  Assessment & Plan  · COPD not in acute exacerbation  · Reports smoking 1 5 PPD  · Not on continuous oxygen therapy   · Continue spiriva, advair (advair not on formulary, will give breo)      Dehydrationresolved as of 12/25/2019  Assessment & Plan  · Secondary to diarrhea  · Cont IVF but otherwise improved  · Diet as tolerated    Diarrhearesolved as of 12/25/2019  Assessment & Plan  · Patient reports diarrhea for the past week  Reports going to the bathroom 6-8 times daily  · CT scan shows diverticulosis without evidence of acute diverticulitis  · Digital rectal exam in the ER with brown stool but + for occult blood  · Diarrhea ? secondary to alcohol withdrawal as patient admits to recently cutting down on alcohol intake  · C  Diff and enteric stool panel negative  · No bowel movement since admission, continue to monitor  · Continue IV hydration    Hyponatremiaresolved as of 12/25/2019  Assessment & Plan  · Mild hyponatremia on admission with sodium 132  · Patient received 4L NSS in the ER   · This am up to 137      Acute kidney injury superimposed on CKD (HCC)resolved as of 12/25/2019  Assessment & Plan  · ENMA on CKD present on admission  Creatinine 2 2  Baseline appears to be 0 9-1 0   Down to 0 91 this am  · Prerenal secondary to dehydration vs ATN secondary to hypotension  · Creatinine has been as high as 3 on previous admissions  · Close I&O monitoring  · Daily weights  · Avoid nephrotoxins     Subjective:   Patient seen and examined at bedside  Reports he feels tired  Denies HA, CP, SOB, palpitations, abdominal pain, nausea, vomiting, change in bowel or urinary function  Anticipating discharge today  Objective:     Vitals: Blood pressure 133/74, pulse 81, temperature 99 1 °F (37 3 °C), resp  rate 18, height 6' 2" (1 88 m), weight 88 3 kg (194 lb 10 7 oz), SpO2 96 %  ,Body mass index is 24 99 kg/m²    Wt Readings from Last 3 Encounters:   12/25/19 88 3 kg (194 lb 10 7 oz)   11/22/19 88 7 kg (195 lb 8 8 oz)   10/28/19 90 kg (198 lb 6 4 oz)       Intake/Output Summary (Last 24 hours) at 12/25/2019 0856  Last data filed at 12/25/2019 0524  Gross per 24 hour   Intake    Output 950 ml   Net -950 ml       Physical Exam: /74   Pulse 81   Temp 99 1 °F (37 3 °C)   Resp 18   Ht 6' 2" (1 88 m)   Wt 88 3 kg (194 lb 10 7 oz)   SpO2 96%   BMI 24 99 kg/m²   General appearance: alert and oriented, in no acute distress  Head: Normocephalic, without obvious abnormality, atraumatic  Throat: moist mucous membranes  Lungs: clear to auscultation bilaterally  Heart: regular rate and rhythm, S1, S2 normal, no murmur, click, rub or gallop  Abdomen: soft, non-tender; bowel sounds normal; no masses,  no organomegaly  Extremities: extremities normal, warm and well-perfused; no cyanosis, clubbing, or edema  Pulses: 2+ and symmetric  Skin: Skin color, texture, turgor normal  No rashes or lesions     Recent Results (from the past 24 hour(s))   Fingerstick Glucose (POCT)    Collection Time: 12/24/19 11:14 AM   Result Value Ref Range    POC Glucose 156 (H) 65 - 140 mg/dl   Fingerstick Glucose (POCT)    Collection Time: 12/24/19  4:14 PM   Result Value Ref Range    POC Glucose 114 65 - 140 mg/dl   Fingerstick Glucose (POCT)    Collection Time: 12/24/19  8:58 PM   Result Value Ref Range    POC Glucose 164 (H) 65 - 140 mg/dl   CBC and differential Collection Time: 12/25/19  5:19 AM   Result Value Ref Range    WBC 4 72 4 31 - 10 16 Thousand/uL    RBC 4 00 3 88 - 5 62 Million/uL    Hemoglobin 13 2 12 0 - 17 0 g/dL    Hematocrit 38 4 36 5 - 49 3 %    MCV 96 82 - 98 fL    MCH 33 0 26 8 - 34 3 pg    MCHC 34 4 31 4 - 37 4 g/dL    RDW 11 9 11 6 - 15 1 %    MPV 11 9 8 9 - 12 7 fL    Platelets 337 (L) 969 - 390 Thousands/uL    nRBC 0 /100 WBCs    Neutrophils Relative 43 43 - 75 %    Immat GRANS % 1 0 - 2 %    Lymphocytes Relative 27 14 - 44 %    Monocytes Relative 23 (H) 4 - 12 %    Eosinophils Relative 5 0 - 6 %    Basophils Relative 1 0 - 1 %    Neutrophils Absolute 2 07 1 85 - 7 62 Thousands/µL    Immature Grans Absolute 0 03 0 00 - 0 20 Thousand/uL    Lymphocytes Absolute 1 27 0 60 - 4 47 Thousands/µL    Monocytes Absolute 1 09 0 17 - 1 22 Thousand/µL    Eosinophils Absolute 0 22 0 00 - 0 61 Thousand/µL    Basophils Absolute 0 04 0 00 - 0 10 Thousands/µL   Magnesium    Collection Time: 12/25/19  5:19 AM   Result Value Ref Range    Magnesium 1 4 (L) 1 6 - 2 6 mg/dL   Basic metabolic panel    Collection Time: 12/25/19  5:19 AM   Result Value Ref Range    Sodium 137 136 - 145 mmol/L    Potassium 3 0 (L) 3 5 - 5 3 mmol/L    Chloride 98 (L) 100 - 108 mmol/L    CO2 28 21 - 32 mmol/L    ANION GAP 11 4 - 13 mmol/L    BUN 15 5 - 25 mg/dL    Creatinine 0 91 0 60 - 1 30 mg/dL    Glucose 99 65 - 140 mg/dL    Calcium 8 2 (L) 8 3 - 10 1 mg/dL    eGFR 93 ml/min/1 73sq m   Fingerstick Glucose (POCT)    Collection Time: 12/25/19  7:19 AM   Result Value Ref Range    POC Glucose 95 65 - 140 mg/dl       Current Facility-Administered Medications   Medication Dose Route Frequency Provider Last Rate Last Dose    amLODIPine (NORVASC) tablet 5 mg  5 mg Oral Daily Yobany PA-C   5 mg at 12/25/19 0804    atorvastatin (LIPITOR) tablet 40 mg  40 mg Oral Daily With [employer] PA-C   40 mg at 12/24/19 1711    clopidogrel (PLAVIX) tablet 75 mg  75 mg Oral Daily [facility] Z TANK Mcfarland   75 mg at 12/25/19 0804    fluticasone-vilanterol (BREO ELLIPTA) 200-25 MCG/INH inhaler 1 puff  1 puff Inhalation Daily Mega Irwin PA-C   1 puff at 54/03/84 2568    folic acid (FOLVITE) tablet 1 mg  1 mg Oral Daily Mega Irwin PA-C   1 mg at 12/25/19 0804    heparin (porcine) subcutaneous injection 5,000 Units  5,000 Units Subcutaneous [facility] Mega Irwin PA-C   5,000 Units at 12/25/19 0525    insulin lispro (HumaLOG) 100 units/mL subcutaneous injection 1-5 Units  1-5 Units Subcutaneous TID [facility] [place] Mega Irwin PA-C   1 Units at 12/24/19 1133    insulin lispro (HumaLOG) 100 units/mL subcutaneous injection 1-5 Units  1-5 Units Subcutaneous HS Mega Irwin PA-C   1 Units at 12/24/19 2130    ipratropium-albuterol (DUO-NEB) 0 5-2 5 mg/3 mL inhalation solution 3 mL  3 mL Nebulization TID Mega Irwin PA-C   3 mL at 12/25/19 9409    magnesium sulfate 2 g/50 mL IVPB (premix) 2 g  2 g Intravenous Once Lucia Vasquez MD   2 g at 12/25/19 0816    metoprolol succinate (TOPROL-XL) 24 hr tablet 50 mg  50 mg Oral Daily Mega Irwin PA-C   50 mg at 12/25/19 0804    multivitamin-minerals (CENTRUM) tablet 1 tablet  1 tablet Oral Daily Mega Irwin PA-C   1 tablet at 12/25/19 0804    nicotine (NICODERM CQ) 21 mg/24 hr TD 24 hr patch 21 mg  21 mg Transdermal Daily Damian Andrade DO   21 mg at 12/25/19 0805    ondansetron (ZOFRAN) injection 4 mg  4 mg Intravenous Q8H PRN Mega Irwin PA-C        pantoprazole (PROTONIX) EC tablet 40 mg  40 mg Oral Early Morning Mega Irwin PA-C   40 mg at 12/25/19 4656    potassium chloride (K-DUR,KLOR-CON) CR tablet 40 mEq  40 mEq Oral Once Lucia Vasquez MD        potassium chloride 20 mEq IVPB (premix)  20 mEq Intravenous Once Lucia Vasquez MD        ranolazine (RANEXA) 12 hr tablet 500 mg  500 mg Oral BID Mega Irwin PA-C   500 mg at 12/25/19 0804    sertraline (ZOLOFT) tablet 100 mg  100 mg Oral Daily Celi Z Bartolo PA-C   100 mg at 12/25/19 3748    thiamine (VITAMIN B1) tablet 100 mg  100 mg Oral Daily LISANDRO Dupree-C   100 mg at 12/25/19 9441       Invasive Devices     Peripheral Intravenous Line            Peripheral IV 12/22/19 Right Antecubital 3 days    Peripheral IV 12/22/19 Right Forearm 3 days                Lab, Imaging and other studies: I have personally reviewed pertinent reports      VTE Pharmacologic Prophylaxis: Heparin  VTE Mechanical Prophylaxis: sequential compression device    Chanell Cisneros MD

## 2019-12-25 NOTE — ASSESSMENT & PLAN NOTE
· COPD not in acute exacerbation  · Reports smoking 1 5 PPD  · Not on continuous oxygen therapy   · Continue spiriva, advair (advair not on formulary, will give breo)  · Prescribed nicotine patch 21 mg

## 2019-12-26 ENCOUNTER — TRANSITIONAL CARE MANAGEMENT (OUTPATIENT)
Dept: FAMILY MEDICINE CLINIC | Facility: CLINIC | Age: 59
End: 2019-12-26

## 2019-12-27 LAB
BACTERIA BLD CULT: NORMAL
BACTERIA BLD CULT: NORMAL

## 2019-12-31 ENCOUNTER — PATIENT OUTREACH (OUTPATIENT)
Dept: FAMILY MEDICINE CLINIC | Facility: CLINIC | Age: 59
End: 2019-12-31

## 2019-12-31 DIAGNOSIS — Z71.89 COMPLEX CARE COORDINATION: Primary | ICD-10-CM

## 2019-12-31 NOTE — PROGRESS NOTES
First outreach attempt  Left message for patient advising of CM role in managing her care  CM contact number provided  Requested return phone call  Pt in ED  Letter of unable to reach mailed to patient with CM contact information

## 2020-01-15 ENCOUNTER — APPOINTMENT (EMERGENCY)
Dept: RADIOLOGY | Facility: HOSPITAL | Age: 60
End: 2020-01-15
Payer: MEDICARE

## 2020-01-15 ENCOUNTER — HOSPITAL ENCOUNTER (EMERGENCY)
Facility: HOSPITAL | Age: 60
Discharge: HOME | End: 2020-01-15
Attending: EMERGENCY MEDICINE | Admitting: EMERGENCY MEDICINE
Payer: MEDICARE

## 2020-01-15 VITALS
HEART RATE: 86 BPM | BODY MASS INDEX: 25.29 KG/M2 | DIASTOLIC BLOOD PRESSURE: 70 MMHG | SYSTOLIC BLOOD PRESSURE: 122 MMHG | RESPIRATION RATE: 16 BRPM | OXYGEN SATURATION: 97 % | WEIGHT: 197 LBS | TEMPERATURE: 100.3 F

## 2020-01-15 DIAGNOSIS — R11.2 NAUSEA AND VOMITING: Primary | ICD-10-CM

## 2020-01-15 DIAGNOSIS — J18.9 PNEUMONIA: ICD-10-CM

## 2020-01-15 LAB
ALBUMIN SERPL BCP-MCNC: 3.7 G/DL (ref 3.5–5)
ALP SERPL-CCNC: 73 U/L (ref 46–116)
ALT SERPL W P-5'-P-CCNC: 36 U/L (ref 12–78)
ANION GAP SERPL CALCULATED.3IONS-SCNC: 9 MMOL/L (ref 4–13)
APTT PPP: 32 SECONDS (ref 25–32)
AST SERPL W P-5'-P-CCNC: 36 U/L (ref 5–45)
BASOPHILS # BLD AUTO: 0.04 THOUSANDS/ΜL (ref 0–0.1)
BASOPHILS NFR BLD AUTO: 0 % (ref 0–1)
BILIRUB SERPL-MCNC: 0.7 MG/DL (ref 0.2–1)
BUN SERPL-MCNC: 19 MG/DL (ref 5–25)
CALCIUM SERPL-MCNC: 9.1 MG/DL (ref 8.3–10.1)
CHLORIDE SERPL-SCNC: 93 MMOL/L (ref 100–108)
CO2 SERPL-SCNC: 28 MMOL/L (ref 21–32)
CREAT SERPL-MCNC: 1.09 MG/DL (ref 0.6–1.3)
EOSINOPHIL # BLD AUTO: 0.01 THOUSAND/ΜL (ref 0–0.61)
EOSINOPHIL NFR BLD AUTO: 0 % (ref 0–6)
ERYTHROCYTE [DISTWIDTH] IN BLOOD BY AUTOMATED COUNT: 12.8 % (ref 11.6–15.1)
ETHANOL SERPL-MCNC: <3 MG/DL (ref 0–3)
FLUAV RNA NPH QL NAA+PROBE: NORMAL
FLUBV RNA NPH QL NAA+PROBE: NORMAL
GFR SERPL CREATININE-BSD FRML MDRD: 75 ML/MIN/1.73SQ M
GLUCOSE SERPL-MCNC: 115 MG/DL (ref 65–140)
HCT VFR BLD AUTO: 39.7 % (ref 36.5–49.3)
HGB BLD-MCNC: 13.8 G/DL (ref 12–17)
IMM GRANULOCYTES # BLD AUTO: 0.22 THOUSAND/UL (ref 0–0.2)
IMM GRANULOCYTES NFR BLD AUTO: 2 % (ref 0–2)
INR PPP: 1.08 (ref 0.91–1.09)
LYMPHOCYTES # BLD AUTO: 0.88 THOUSANDS/ΜL (ref 0.6–4.47)
LYMPHOCYTES NFR BLD AUTO: 6 % (ref 14–44)
MCH RBC QN AUTO: 33.2 PG (ref 26.8–34.3)
MCHC RBC AUTO-ENTMCNC: 34.8 G/DL (ref 31.4–37.4)
MCV RBC AUTO: 95 FL (ref 82–98)
MONOCYTES # BLD AUTO: 1.71 THOUSAND/ΜL (ref 0.17–1.22)
MONOCYTES NFR BLD AUTO: 12 % (ref 4–12)
NEUTROPHILS # BLD AUTO: 11.39 THOUSANDS/ΜL (ref 1.85–7.62)
NEUTS SEG NFR BLD AUTO: 80 % (ref 43–75)
NRBC BLD AUTO-RTO: 0 /100 WBCS
PLATELET # BLD AUTO: 59 THOUSANDS/UL (ref 149–390)
PMV BLD AUTO: 11.1 FL (ref 8.9–12.7)
POTASSIUM SERPL-SCNC: 3.8 MMOL/L (ref 3.5–5.3)
PROT SERPL-MCNC: 8.1 G/DL (ref 6.4–8.2)
PROTHROMBIN TIME: 11.5 SECONDS (ref 9.8–12)
RBC # BLD AUTO: 4.16 MILLION/UL (ref 3.88–5.62)
RSV RNA NPH QL NAA+PROBE: NORMAL
SODIUM SERPL-SCNC: 130 MMOL/L (ref 136–145)
WBC # BLD AUTO: 14.25 THOUSAND/UL (ref 4.31–10.16)

## 2020-01-15 PROCEDURE — 87631 RESP VIRUS 3-5 TARGETS: CPT | Performed by: EMERGENCY MEDICINE

## 2020-01-15 PROCEDURE — 96374 THER/PROPH/DIAG INJ IV PUSH: CPT

## 2020-01-15 PROCEDURE — 99284 EMERGENCY DEPT VISIT MOD MDM: CPT | Performed by: EMERGENCY MEDICINE

## 2020-01-15 PROCEDURE — 85730 THROMBOPLASTIN TIME PARTIAL: CPT | Performed by: EMERGENCY MEDICINE

## 2020-01-15 PROCEDURE — 85610 PROTHROMBIN TIME: CPT | Performed by: EMERGENCY MEDICINE

## 2020-01-15 PROCEDURE — 36415 COLL VENOUS BLD VENIPUNCTURE: CPT | Performed by: EMERGENCY MEDICINE

## 2020-01-15 PROCEDURE — 99284 EMERGENCY DEPT VISIT MOD MDM: CPT

## 2020-01-15 PROCEDURE — 85025 COMPLETE CBC W/AUTO DIFF WBC: CPT | Performed by: EMERGENCY MEDICINE

## 2020-01-15 PROCEDURE — 71046 X-RAY EXAM CHEST 2 VIEWS: CPT

## 2020-01-15 PROCEDURE — 80053 COMPREHEN METABOLIC PANEL: CPT | Performed by: EMERGENCY MEDICINE

## 2020-01-15 PROCEDURE — 80320 DRUG SCREEN QUANTALCOHOLS: CPT | Performed by: EMERGENCY MEDICINE

## 2020-01-15 PROCEDURE — 96361 HYDRATE IV INFUSION ADD-ON: CPT

## 2020-01-15 RX ORDER — AMOXICILLIN AND CLAVULANATE POTASSIUM 875; 125 MG/1; MG/1
1 TABLET, FILM COATED ORAL ONCE
Status: COMPLETED | OUTPATIENT
Start: 2020-01-15 | End: 2020-01-15

## 2020-01-15 RX ORDER — ONDANSETRON 4 MG/1
4 TABLET, ORALLY DISINTEGRATING ORAL EVERY 6 HOURS PRN
Qty: 20 TABLET | Refills: 0 | Status: SHIPPED | OUTPATIENT
Start: 2020-01-15 | End: 2020-02-28 | Stop reason: HOSPADM

## 2020-01-15 RX ORDER — ACETAMINOPHEN 325 MG/1
650 TABLET ORAL ONCE
Status: COMPLETED | OUTPATIENT
Start: 2020-01-15 | End: 2020-01-15

## 2020-01-15 RX ORDER — ONDANSETRON 2 MG/ML
4 INJECTION INTRAMUSCULAR; INTRAVENOUS ONCE
Status: COMPLETED | OUTPATIENT
Start: 2020-01-15 | End: 2020-01-15

## 2020-01-15 RX ORDER — AMOXICILLIN AND CLAVULANATE POTASSIUM 875; 125 MG/1; MG/1
1 TABLET, FILM COATED ORAL EVERY 12 HOURS
Qty: 20 TABLET | Refills: 0 | Status: SHIPPED | OUTPATIENT
Start: 2020-01-15 | End: 2020-01-25

## 2020-01-15 RX ADMIN — ACETAMINOPHEN 650 MG: 325 TABLET, FILM COATED ORAL at 17:04

## 2020-01-15 RX ADMIN — SODIUM CHLORIDE 1000 ML: 0.9 INJECTION, SOLUTION INTRAVENOUS at 17:13

## 2020-01-15 RX ADMIN — SODIUM CHLORIDE 1000 ML: 0.9 INJECTION, SOLUTION INTRAVENOUS at 18:52

## 2020-01-15 RX ADMIN — AMOXICILLIN AND CLAVULANATE POTASSIUM 1 TABLET: 875; 125 TABLET, FILM COATED ORAL at 20:44

## 2020-01-15 RX ADMIN — ONDANSETRON 4 MG: 2 INJECTION INTRAMUSCULAR; INTRAVENOUS at 17:13

## 2020-01-15 NOTE — ED PROVIDER NOTES
History  Chief Complaint   Patient presents with    Vomiting     vomiting diarrhea, abd pain for three days, not eating    Sore Throat     sore throat and headache for three days as well     The patient is a 45-year-old male with known history of alcohol abuse presents with complaint of 3 day history of nausea vomiting and diarrhea with some crampy abdominal pain, patient states he has been eating or drinking because he has not been able to keep anything down for 3 days  Patient states that he has not had an alcoholic drink for 3 days  Patient states he does have some withdrawal symptoms but has never had DTs or seizure  Patient states he does feel tremulous at this time  Patient is also complaining of sore throat and headache behind his eyes also  Patient seems unaware that he has been running high fevers at home but he has a 101 fever here  Patient denies any direct sick contacts  There is some mild URI like symptoms  Patient denies any chest pain or any shortness of breath associated with this complaint  Patient denies any urinary symptoms such as dysuria or frequency or urgency  Prior to Admission Medications   Prescriptions Last Dose Informant Patient Reported? Taking?    Blood Glucose Monitoring Suppl (ONE TOUCH ULTRA MINI) w/Device KIT   Yes No   Sig: Use as directed   ONE TOUCH ULTRA TEST test strip   Yes No   Sig: 3 (three) times a day Test   [facility] LANCETS FINE MISC   Yes No   Sig: 3 (three) times a day Test   TAMSULOSIN HCL PO  Self Yes No   Sig: Take 0 4 mg by mouth daily   albuterol (PROVENTIL HFA,VENTOLIN HFA) 90 mcg/act inhaler   No No   Sig: Inhale 2 puffs every 4 (four) hours as needed for wheezing   amLODIPine (NORVASC) 5 mg tablet   Yes No   Sig: Take 5 mg by mouth daily   clopidogrel (PLAVIX) 75 mg tablet   No No   Sig: Take 1 tablet (75 mg total) by mouth daily   esomeprazole (NexIUM) 20 mg capsule   No No   Sig: Take 1 capsule (20 mg total) by mouth daily famotidine (PEPCID) 20 mg tablet   Yes No   Sig: Take 20 mg by mouth 2 (two) times a day   fluticasone-salmeterol (ADVAIR) 500-50 mcg/dose   Yes No   Sig: Inhale 1 puff every 12 (twelve) hours   folic acid (FOLVITE) 1 mg tablet   No No   Sig: Take 1 tablet (1 mg total) by mouth daily   fosinopril (MONOPRIL) 10 mg tablet   No No   Sig: Take 1 tablet (10 mg total) by mouth daily   magnesium oxide (MAG-OX) 400 mg   No No   Sig: Take 1 tablet (400 mg total) by mouth daily   metFORMIN (GLUCOPHAGE) 1000 MG tablet   Yes No   Sig: Take 1,000 mg by mouth 2 (two) times a day with meals   metoprolol succinate (TOPROL-XL) 100 mg 24 hr tablet   No No   Sig: Take 0 5 tablets (50 mg total) by mouth daily   nicotine (NICODERM CQ) 21 mg/24 hr TD 24 hr patch Not Taking at Unknown time  No No   Sig: Place 1 patch on the skin daily   Patient not taking: Reported on 1/15/2020   ranolazine (RANEXA) 500 mg 12 hr tablet   No No   Sig: Take 1 tablet (500 mg total) by mouth 2 (two) times a day   rosuvastatin (CRESTOR) 20 MG tablet   No No   Sig: Take 1 tablet (20 mg total) by mouth daily   sertraline (ZOLOFT) 100 mg tablet   Yes No   Sig: Take 100 mg by mouth daily    thiamine 100 MG tablet   No No   Sig: Take 1 tablet (100 mg total) by mouth daily   tiotropium (SPIRIVA) 18 mcg inhalation capsule  Self Yes No   Sig: Place 18 mcg into inhaler and inhale daily      Facility-Administered Medications: None       Past Medical History:   Diagnosis Date    Cancer [facility])     prostate ca,had radiation    Cardiac disease     stents,then triple bypass    [facility] [address]     Diabetes mellitus [facility]     ETOH abuse     Hx of heart artery stent     2014    Hyperlipidemia     Hypertension     [facility]     Renal disorder     pyelonephritis    S/P CABG x 1     2004       Past Surgical History:   Procedure Laterality Date    CORONARY ARTERY BYPASS GRAFT  2004    TONSILLECTOMY         Family History Problem Relation Age of Onset    Diabetes Mother     Uterine cancer Mother     COPD Father     Hypertension Father      I have reviewed and agree with the history as documented  Social History     Tobacco Use    Smoking status: Current Every Day Smoker     Packs/day: 1 50     Years: 40 00     Pack years: 60 00    Smokeless tobacco: Never Used   Substance Use Topics    Alcohol use: Yes     Frequency: 4 or more times a week     Drinks per session: 10 or more     Binge frequency: Daily or almost daily     Comment: 1/5th vodka a day, states last drink 3 days ago    Drug use: No        Review of Systems   Constitutional: Positive for appetite change, chills, fatigue and fever  HENT: Positive for congestion and sore throat  Negative for facial swelling, rhinorrhea and trouble swallowing  Respiratory: Negative for cough, chest tightness and shortness of breath  Cardiovascular: Negative for chest pain  Gastrointestinal: Positive for abdominal pain, diarrhea, nausea and vomiting  Genitourinary: Negative for dysuria and flank pain  Musculoskeletal: Negative for back pain and neck pain  Skin: Negative  Neurological: Positive for tremors and headaches  Negative for weakness and numbness  Hematological: Negative  Psychiatric/Behavioral: Negative  Physical Exam  Physical Exam   Constitutional: He is oriented to person, place, and time  He appears well-developed and well-nourished  He appears ill  HENT:   Head: Normocephalic and atraumatic  Left Ear: Tympanic membrane normal    Mouth/Throat: Mucous membranes are pale and dry  No uvula swelling  Posterior oropharyngeal erythema present  No posterior oropharyngeal edema  Eyes: Pupils are equal, round, and reactive to light  EOM are normal    Neck: Normal range of motion  Neck supple  Cardiovascular: Regular rhythm  Tachycardia present  Pulmonary/Chest: Effort normal and breath sounds normal    Abdominal: Soft   He exhibits no distension  Bowel sounds are increased  There is generalized tenderness  There is no rigidity, no rebound and no guarding  Neurological: He is alert and oriented to person, place, and time  Skin: Skin is warm and dry  Capillary refill takes less than 2 seconds  Psychiatric: He has a normal mood and affect  His behavior is normal    Nursing note and vitals reviewed        Vital Signs  ED Triage Vitals [01/15/20 1529]   Temperature Pulse Respirations Blood Pressure SpO2   (!) 101 1 °F (38 4 °C) (!) 124 (!) 24 (!) 187/97 97 %      Temp Source Heart Rate Source Patient Position - Orthostatic VS BP Location FiO2 (%)   Tympanic Monitor Sitting Right arm --      Pain Score       7           Vitals:    01/15/20 1745 01/15/20 1830 01/15/20 1915 01/15/20 2015   BP: 142/70 134/78 132/79 122/70   Pulse: 96 88 84 86   Patient Position - Orthostatic VS: Lying Lying Lying Lying         Visual Acuity      ED Medications  Medications   acetaminophen (TYLENOL) tablet 650 mg (650 mg Oral Given 1/15/20 1704)   sodium chloride 0 9 % bolus 1,000 mL (0 mL Intravenous Stopped 1/15/20 1835)   ondansetron (ZOFRAN) injection 4 mg (4 mg Intravenous Given 1/15/20 1713)   sodium chloride 0 9 % bolus 1,000 mL (0 mL Intravenous Stopped 1/15/20 2021)   amoxicillin-clavulanate (AUGMENTIN) 875-125 mg per tablet 1 tablet (1 tablet Oral Given 1/15/20 2044)       Diagnostic Studies  Results Reviewed     Procedure Component Value Units Date/Time    Influenza A/B and RSV PCR [[id number]]  (Normal) Collected:  01/15/20 1713    Lab Status:  Final result Specimen:  Nasopharyngeal Swab Updated:  01/15/20 1759     INFLUENZA A PCR None Detected     INFLUENZA B PCR None Detected     RSV PCR None Detected    CBC and differential [[id number]]  (Abnormal) Collected:  01/15/20 1713    Lab Status:  Final result Specimen:  Blood from Arm, Left Updated:  01/15/20 1737     WBC 14 25 Thousand/uL      RBC 4 16 Million/uL      Hemoglobin 13 8 g/dL      Hematocrit 39 7 % MCV 95 fL      MCH 33 2 pg      MCHC 34 8 g/dL      RDW 12 8 %      MPV 11 1 fL      Platelets 59 Thousands/uL      nRBC 0 /100 WBCs      Neutrophils Relative 80 %      Immat GRANS % 2 %      Lymphocytes Relative 6 %      Monocytes Relative 12 %      Eosinophils Relative 0 %      Basophils Relative 0 %      Neutrophils Absolute 11 39 Thousands/µL      Immature Grans Absolute 0 22 Thousand/uL      Lymphocytes Absolute 0 88 Thousands/µL      Monocytes Absolute 1 71 Thousand/µL      Eosinophils Absolute 0 01 Thousand/µL      Basophils Absolute 0 04 Thousands/µL     Comprehensive metabolic panel [[id number]]  (Abnormal) Collected:  01/15/20 1713    Lab Status:  Final result Specimen:  Blood from Arm, Left Updated:  01/15/20 1735     Sodium 130 mmol/L      Potassium 3 8 mmol/L      Chloride 93 mmol/L      CO2 28 mmol/L      ANION GAP 9 mmol/L      BUN 19 mg/dL      Creatinine 1 09 mg/dL      Glucose 115 mg/dL      Calcium 9 1 mg/dL      AST 36 U/L      ALT 36 U/L      Alkaline Phosphatase 73 U/L      Total Protein 8 1 g/dL      Albumin 3 7 g/dL      Total Bilirubin 0 70 mg/dL      eGFR 75 ml/min/1 73sq m     Narrative:       [facility] guidelines for Chronic Kidney Disease (CKD):     Stage 1 with normal or high GFR (GFR > 90 mL/min/1 73 square meters)    Stage 2 Mild CKD (GFR = 60-89 mL/min/1 73 square meters)    Stage 3A Moderate CKD (GFR = 45-59 mL/min/1 73 square meters)    Stage 3B Moderate CKD (GFR = 30-44 mL/min/1 73 square meters)    Stage 4 Severe CKD (GFR = 15-29 mL/min/1 73 square meters)    Stage 5 End Stage CKD (GFR <15 mL/min/1 73 square meters)  Note: GFR calculation is accurate only with a steady state creatinine    Ethanol [[id number]]  (Normal) Collected:  01/15/20 1713    Lab Status:  Final result Specimen:  Blood from Arm, Left Updated:  01/15/20 1735     Ethanol Lvl <3 mg/dL     APTT [[id number]]  (Normal) Collected:  01/15/20 1713    Lab Status:  Final result Specimen: Blood from Arm, Left Updated:  01/15/20 1732     PTT 32 seconds     Protime-INR [[id number]]  (Normal) Collected:  01/15/20 1713    Lab Status:  Final result Specimen:  Blood from Arm, Left Updated:  01/15/20 1732     Protime 11 5 seconds      INR 1 08                 XR chest 2 views   Final Result by Leisa Ledezma DO (01/16 6090)      No acute cardiopulmonary disease  Workstation performed: [id number]                    Procedures  Procedures         ED Course                               MDM  Number of Diagnoses or Management Options  Nausea and vomiting:   Pneumonia:   Diagnosis management comments: Patient got improvement with 2 L of IV fluids and antiemetics  Patient is stating he is hungry down able to keep down a sandwich and a p o  Challenge  Patient's lab work showed a mildly elevated white count and the x-ray shows possibly an early infiltrate in the right lower lobe that is interpreted by me  I discussed findings with the patient he is going to be discharged home with antibiotics and antiemetics  Patient is to follow up with primary care doctor as needed or return to the emergency room worsening of symptoms  I answered all questions the best my ability, the patient verbalized understanding and is in agreement with the assessment plan         Amount and/or Complexity of Data Reviewed  Clinical lab tests: ordered and reviewed  Tests in the radiology section of CPT®: ordered and reviewed          Disposition  Final diagnoses:   Nausea and vomiting   Pneumonia     Time reflects when diagnosis was documented in both MDM as applicable and the Disposition within this note     Time User Action Codes Description Comment    1/15/2020  8:35 PM Angela Mauro Add [R11 2] Nausea and vomiting     1/15/2020  8:35 PM Angela Mauro Add [J18 9] Pneumonia       ED Disposition     ED Disposition Condition Date/Time Comment    Discharge Stable Wed Buddy 15, 2020  8:35 PM Fidelina Gonzalez discharge to home/self care             Follow-up Information     Follow up With Specialties Details Why Contact Info Additional Information    [address] Emergency Department Emergency Medicine  If symptoms worsen [address] [facility] ED, [place], [place], [address]          Discharge Medication List as of 1/15/2020  8:37 PM      START taking these medications    Details   amoxicillin-clavulanate (AUGMENTIN) 875-125 mg per tablet Take 1 tablet by mouth every 12 (twelve) hours for 10 days, Starting Wed 1/15/2020, Until Sat 1/25/2020, Print      ondansetron (ZOFRAN-ODT) 4 mg disintegrating tablet Take 1 tablet (4 mg total) by mouth every 6 (six) hours as needed for nausea or vomiting, Starting Wed 1/15/2020, Print         CONTINUE these medications which have NOT CHANGED    Details   albuterol (PROVENTIL HFA,VENTOLIN HFA) 90 mcg/act inhaler Inhale 2 puffs every 4 (four) hours as needed for wheezing, Starting Fri 10/12/2018, Print      amLODIPine (NORVASC) 5 mg tablet Take 5 mg by mouth daily, Historical Med      Blood Glucose Monitoring Suppl (ONE TOUCH ULTRA MINI) w/Device KIT Use as directed, Starting Thu 5/16/2019, Historical Med      clopidogrel (PLAVIX) 75 mg tablet Take 1 tablet (75 mg total) by mouth daily, Starting Tue 6/11/2019, Normal      esomeprazole (NexIUM) 20 mg capsule Take 1 capsule (20 mg total) by mouth daily, Starting Wed 12/25/2019, Normal      famotidine (PEPCID) 20 mg tablet Take 20 mg by mouth 2 (two) times a day, Historical Med      fluticasone-salmeterol (ADVAIR) 500-50 mcg/dose Inhale 1 puff every 12 (twelve) hours, Historical Med      folic acid (FOLVITE) 1 mg tablet Take 1 tablet (1 mg total) by mouth daily, Starting Wed 12/25/2019, Normal      fosinopril (MONOPRIL) 10 mg tablet Take 1 tablet (10 mg total) by mouth daily, Starting Wed 12/25/2019, Normal      magnesium oxide (MAG-OX) 400 mg Take 1 tablet (400 mg total) by mouth daily, Starting Wed 12/25/2019, Normal      metFORMIN (GLUCOPHAGE) 1000 MG tablet Take 1,000 mg by mouth 2 (two) times a day with meals, Historical Med      metoprolol succinate (TOPROL-XL) 100 mg 24 hr tablet Take 0 5 tablets (50 mg total) by mouth daily, Starting Mon 11/25/2019, Until Thu 11/19/2020, Normal      nicotine (NICODERM CQ) 21 mg/24 hr TD 24 hr patch Place 1 patch on the skin daily, Starting Thu 12/26/2019, Normal      ONE TOUCH ULTRA TEST test strip 3 (three) times a day Test, Starting Thu 5/16/2019, Historical Med      ONETOUCH DELICA LANCETS FINE MISC 3 (three) times a day Test, Starting Thu 5/16/2019, Historical Med      ranolazine (RANEXA) 500 mg 12 hr tablet Take 1 tablet (500 mg total) by mouth 2 (two) times a day, Starting Wed 10/16/2019, Normal      rosuvastatin (CRESTOR) 20 MG tablet Take 1 tablet (20 mg total) by mouth daily, Starting Tue 6/11/2019, Normal      sertraline (ZOLOFT) 100 mg tablet Take 100 mg by mouth daily , Historical Med      TAMSULOSIN HCL PO Take 0 4 mg by mouth daily, Historical Med      thiamine 100 MG tablet Take 1 tablet (100 mg total) by mouth daily, Starting Thu 12/26/2019, Normal      tiotropium (SPIRIVA) 18 mcg inhalation capsule Place 18 mcg into inhaler and inhale daily, Historical Med           No discharge procedures on file      ED Provider  Electronically Signed by           Mynor Go MD  01/16/20 8842

## 2020-01-20 ENCOUNTER — PATIENT OUTREACH (OUTPATIENT)
Dept: FAMILY MEDICINE CLINIC | Facility: CLINIC | Age: 60
End: 2020-01-20

## 2020-01-20 DIAGNOSIS — I25.10 CAD (CORONARY ARTERY DISEASE): ICD-10-CM

## 2020-01-20 RX ORDER — RANOLAZINE 500 MG/1
500 TABLET, EXTENDED RELEASE ORAL 2 TIMES DAILY
Qty: 60 TABLET | Refills: 0 | OUTPATIENT
Start: 2020-01-20

## 2020-01-20 NOTE — TELEPHONE ENCOUNTER
I saw this patient once in august   As such I do not feel comfortable prescribing as I do not know him well enough, he also has an extensive cardiac history and should be seeing cardio for this   Additionally patient needs to be reassigned to a new PCP [place] he would benefit from someone who is routinely in offfice

## 2020-01-20 NOTE — PROGRESS NOTES
Received phone call from patient  Pt received letter from CM  Pt states he needs his Ranexa refilled  Encouraged patient to make an appointment to see his PCP and to meet with CM  Pt agreeable  DESIREE  scheduled appointmentfor 1/22/2020 at 11am  Pt agreeable to this date and time  Pt states he has quit drinking since his last admission  States he has gone through the "shakes" so he figured he might as well continue to stop  States he goes to [facility] occasionally  Encouraged patient to try to go to meeting for the support and resources  Advised patient to continue taking folic acid and thiamine  Pt states he is taking both  Advised patient to bring pill bottles to visit so all of his medications can be reviewed  CM will meet with patient at PCP visit to further discuss plan to meet his healthcare needs

## 2020-01-22 ENCOUNTER — PATIENT OUTREACH (OUTPATIENT)
Dept: FAMILY MEDICINE CLINIC | Facility: CLINIC | Age: 60
End: 2020-01-22

## 2020-01-22 DIAGNOSIS — I25.10 CAD (CORONARY ARTERY DISEASE): ICD-10-CM

## 2020-01-22 RX ORDER — RANOLAZINE 500 MG/1
500 TABLET, EXTENDED RELEASE ORAL 2 TIMES DAILY
Qty: 60 TABLET | Refills: 0 | Status: SHIPPED | OUTPATIENT
Start: 2020-01-22 | End: 2020-03-04 | Stop reason: SDUPTHER

## 2020-01-22 NOTE — PROGRESS NOTES
Received phone call from patient  Advised that he missed today's appointment and would like to reschedule  Pt states he remains sober and has been speaking to a friend for support  CM will meet with patient at next PCP visit  Call transferred to agent  Request for medication refill sent

## 2020-01-24 ENCOUNTER — PATIENT OUTREACH (OUTPATIENT)
Dept: FAMILY MEDICINE CLINIC | Facility: CLINIC | Age: 60
End: 2020-01-24

## 2020-01-24 ENCOUNTER — OFFICE VISIT (OUTPATIENT)
Dept: FAMILY MEDICINE CLINIC | Facility: CLINIC | Age: 60
End: 2020-01-24
Payer: MEDICARE

## 2020-01-24 VITALS
WEIGHT: 197 LBS | DIASTOLIC BLOOD PRESSURE: 64 MMHG | OXYGEN SATURATION: 98 % | TEMPERATURE: 98.6 F | BODY MASS INDEX: 25.28 KG/M2 | RESPIRATION RATE: 18 BRPM | HEIGHT: 74 IN | SYSTOLIC BLOOD PRESSURE: 124 MMHG | HEART RATE: 74 BPM

## 2020-01-24 DIAGNOSIS — I25.10 CORONARY ARTERY DISEASE WITHOUT ANGINA PECTORIS, UNSPECIFIED VESSEL OR LESION TYPE, UNSPECIFIED WHETHER NATIVE OR TRANSPLANTED HEART: Chronic | ICD-10-CM

## 2020-01-24 DIAGNOSIS — J18.9 PNEUMONIA OF RIGHT LOWER LOBE DUE TO INFECTIOUS ORGANISM: Primary | ICD-10-CM

## 2020-01-24 DIAGNOSIS — F10.10 ALCOHOL ABUSE: ICD-10-CM

## 2020-01-24 DIAGNOSIS — J44.9 CHRONIC OBSTRUCTIVE PULMONARY DISEASE, UNSPECIFIED COPD TYPE (HCC): Chronic | ICD-10-CM

## 2020-01-24 DIAGNOSIS — Z95.1 HX OF CABG: Chronic | ICD-10-CM

## 2020-01-24 PROCEDURE — 99213 OFFICE O/P EST LOW 20 MIN: CPT | Performed by: FAMILY MEDICINE

## 2020-01-24 RX ORDER — LANOLIN ALCOHOL/MO/W.PET/CERES
400 CREAM (GRAM) TOPICAL DAILY
COMMUNITY
Start: 2020-01-10 | End: 2020-08-10 | Stop reason: SDUPTHER

## 2020-01-24 NOTE — PROGRESS NOTES
Assessment/Plan:   Problem List Items Addressed This Visit        Respiratory    COPD (chronic obstructive pulmonary disease) ([facility] [address] ) (Chronic)       Cardiovascular and Mediastinum    CAD (coronary artery disease) (Chronic)       Other    Hx of CABG (Chronic)    Alcohol abuse (Chronic)      Other Visit Diagnoses     Pneumonia of right lower lobe due to infectious organism [facility])    -  Primary      ·   patient was recently seen in ED on 1/15/20  At that time clinical pneumonia was diagnosed (chest xray as read by radiologist was clear)  Patient has been taking antibiotics as prescribed  Symptoms have improved  · Patient has not had alcoholic drink since the 27NX of January  Has been speaking to  about different options for services that are available to him  Patient is due for annual physical exam    TCM Call (since 12/24/2019)     Date and time call was made  12/26/2019  1:45 PM    Hospital care reviewed  Records reviewed    Patient was hospitialized at  [address] [place] [address]    Date of Admission  12/22/19    Date of discharge  12/25/19    Diagnosis  alcohol intoxication    Disposition  Home      TCM Call (since 12/24/2019)     Should patient be enrolled in anticoag monitoring? No    I have advised the patient to call PCP with any new or worsening symptoms  SONI Monreal LPN         Subjective:      Patient ID: Margie Johnston is a 64 y o  male  HPI    Patient is a 73-year-old male with a history of alcohol abuse who was recently in the ED  On 01/15/2020 patient was treated in the emergency department [facility] for alcohol intoxication and pneumonia  Was given Augmentin and Zofran upon discharge  Patient continues to have intermittent nausea but notes that the Zofran has been helping him  He is almost out of Augmentin  Coughing has improved  Denies hemoptysis  Continues to have subjective fevers  Has not had alcoholic drink since he has been in the ER    He notes he had withdrawal symptoms at 1st but this is no longer an issue  He is motivated to quit drinking  Review of Systems  as noted in HPI    Objective:   /64   Pulse 74   Temp 98 6 °F (37 °C)   Resp 18   Ht 6' 2" (1 88 m)   Wt 89 4 kg (197 lb)   SpO2 98%   BMI 25 29 kg/m²      Physical Exam   Constitutional: He is oriented to person, place, and time  He appears well-developed and well-nourished  Distinct tobacco smell   HENT:   Head: Normocephalic and atraumatic  Cardiovascular: Normal rate and regular rhythm  Pulmonary/Chest: Effort normal  He has wheezes (Bilateral bases likely chronic secondary to smoking history)  Neurological: He is alert and oriented to person, place, and time

## 2020-01-24 NOTE — PROGRESS NOTES
Met with patient during PCP visit  Pt states he is still sober and has not had a drink  Discussed support system  States he has a 'chris" who helps him  Encouraged patient to attend AA meetings to work on sobriety  Discussed finances  States he is short this month but plans on asking his "chris" for a loan  States he normally has enough money to cover his expenses but this month he fell short   Explained that alcohol is very

## 2020-01-29 ENCOUNTER — CONSULT (OUTPATIENT)
Dept: GASTROENTEROLOGY | Facility: CLINIC | Age: 60
End: 2020-01-29
Payer: MEDICARE

## 2020-01-29 VITALS
HEART RATE: 74 BPM | WEIGHT: 198.38 LBS | HEIGHT: 74 IN | DIASTOLIC BLOOD PRESSURE: 70 MMHG | SYSTOLIC BLOOD PRESSURE: 120 MMHG | TEMPERATURE: 98.5 F | BODY MASS INDEX: 25.46 KG/M2

## 2020-01-29 DIAGNOSIS — K76.0 FATTY LIVER: ICD-10-CM

## 2020-01-29 DIAGNOSIS — Z12.11 COLON CANCER SCREENING: ICD-10-CM

## 2020-01-29 DIAGNOSIS — K21.9 GASTROESOPHAGEAL REFLUX DISEASE, ESOPHAGITIS PRESENCE NOT SPECIFIED: ICD-10-CM

## 2020-01-29 DIAGNOSIS — D69.6 THROMBOCYTOPENIA (HCC): Primary | ICD-10-CM

## 2020-01-29 DIAGNOSIS — Z87.898 HISTORY OF HEAVY ALCOHOL CONSUMPTION: ICD-10-CM

## 2020-01-29 PROCEDURE — 99214 OFFICE O/P EST MOD 30 MIN: CPT | Performed by: INTERNAL MEDICINE

## 2020-01-29 NOTE — PATIENT INSTRUCTIONS
Low platelets  - check CBC    Fatty liver  - check ultrasound of liver   - continue to abstain from alcohol    Chest pain and shortness of breath with exertion  - recommend evaluation with cardiologist (pt has appt scheduled)  - recommend quitting tobacco    For difficulty urinating   - recommend being evaluated by urology (will need referral from primary)    Colon cancer screening  - schedule after evaluation of platelets and cardiology appointment    GERD  - continue nexium 20 mg daily  - stop pepcid    Follow up in 3 months

## 2020-01-29 NOTE — PROGRESS NOTES
[facility] - Outpatient Consultation  Altagracia Zuluaga 64 y o  male MRN: [id number]  Encounter: [id number]          ASSESSMENT AND PLAN:      1  Thrombocytopenia  - may be due to BM suppression from alcohol use versus cirrhosis  - check CBC  -check right upper quadrant ultrasound    2  Fatty liver on CT  - check ultrasound of liver   - heavy alcohol use in the past but now has quit drinking  Recommend continuing to abstain from alcohol  3  Chest pain and shortness of breath with exertion  - recommend evaluation with cardiologist (pt has appt scheduled)  - recommend quitting tobacco    4  GERD  - continue nexium 20 mg daily  - stop pepcid    5  Colon cancer screening  - schedule after evaluation of platelets and cardiology appointment    6  Difficulty urinating  - recommend being evaluated by urology (will need referral from primary)    7  Co-morbidities:  Diabetes, COPD, hypertension, CAD s/p CABG (2004) and LHC (on plavix), diastolic heart failure, depression, hyperlipidemia, history of prostate cancer, tobacco use     Follow up in 3 months      ______________________________________________________________________    HPI:  63 yo M with GERD who presents to discuss colonoscopy for for colon cancer screening  Co-morbidities:  Diabetes, COPD, hypertension, CAD s/p CABG (2004) and LHC (on plavix), diastolic heart failure, depression, hyperlipidemia, history of prostate cancer, tobacco use, alcohol use, thrombocytopenia    Recent ER for alcohol intoxication pneumonia  Presented with nausea, vomiting and diarrhea  Treated for pneumonia  Nausea, vomiting and diarrhea have gotten better  Stools are mushy but consistency has improved       Heavy alcohol use  - quit drinking about 15 days ago  - has been drinking heavily for a long time   - hard liquor for the past 10 years with close to 10 drinks per day  - quit drinking in early 90s for 3 years    GERD  - no acid reflux or heartburn now  - nexium 20 mg qd and pepcid 20 mg qd    Colon cancer screening  FOBT+ in 10/2019  No prior colonoscopy  Stools softer after recent "stomach flu "  In past has seen a few drops of blood in stool  No family history of colon cancer  +shortness of breath and chest pain with exertion    Reports urinary frequently, nocturia and dribbling  Taken off of flomax  ROS: No abd pain, nausea, vomiting, constipation, odynophagia, dysphagia, hematemesis, melena, hematochezia, early satiety, appetite changes, wt loss  No jaundice, abd swelling, LE swelling  REVIEW OF SYSTEMS:    CONSTITUTIONAL: Denies any fever, chills, rigors, and weight loss  HEENT: No earache or tinnitus  Denies hearing loss or visual disturbances  CARDIOVASCULAR: No chest pain or palpitations  RESPIRATORY: Denies any cough, hemoptysis, shortness of breath or dyspnea on exertion  GASTROINTESTINAL: As noted in the History of Present Illness  GENITOURINARY: No problems with urination  Denies any hematuria or dysuria  NEUROLOGIC: No dizziness or vertigo, denies headaches  MUSCULOSKELETAL: Denies any muscle or joint pain  SKIN: Denies skin rashes or itching  ENDOCRINE: Denies excessive thirst  Denies intolerance to heat or cold  PSYCHOSOCIAL: Denies depression or anxiety  Denies any recent memory loss         Historical Information   Past Medical History:   Diagnosis Date    Cancer [facility])     prostate ca,had radiation    Cardiac disease     stents,then triple bypass    [facility]     Diabetes mellitus ([facility] )     ETOH abuse     Hx of heart artery stent     2014    Hyperlipidemia     Hypertension     Prostate CA ([facility] )     Renal disorder     pyelonephritis    S/P CABG x 1     2004     Past Surgical History:   Procedure Laterality Date    CORONARY ARTERY BYPASS GRAFT  2004    TONSILLECTOMY       Social History   Social History     Substance and Sexual Activity   Alcohol Use Yes    Frequency: 4 or more times a week    Drinks per session: 10 or more    Binge frequency: Daily or almost daily    Comment: 1/5th vodka a day, states last drink 3 days ago     Social History     Substance and Sexual Activity   Drug Use No     Social History     Tobacco Use   Smoking Status Current Every Day Smoker    Packs/day: 1 50    Years: 40 00    Pack years: 60 00   Smokeless Tobacco Never Used     Family History   Problem Relation Age of Onset    Diabetes Mother     Uterine cancer Mother     COPD Father     Hypertension Father        Meds/Allergies       Current Outpatient Medications:     albuterol (PROVENTIL HFA,VENTOLIN HFA) 90 mcg/act inhaler    amLODIPine (NORVASC) 5 mg tablet    Blood Glucose Monitoring Suppl (ONE TOUCH ULTRA MINI) w/Device KIT    clopidogrel (PLAVIX) 75 mg tablet    esomeprazole (NexIUM) 20 mg capsule    famotidine (PEPCID) 20 mg tablet    fluticasone-salmeterol (ADVAIR) 417-12 mcg/dose    folic acid (FOLVITE) 1 mg tablet    fosinopril (MONOPRIL) 10 mg tablet    magnesium Oxide (MAG-OX) 400 mg TABS    metFORMIN (GLUCOPHAGE) 1000 MG tablet    metoprolol succinate (TOPROL-XL) 100 mg 24 hr tablet    nicotine (NICODERM CQ) 21 mg/24 hr TD 24 hr patch    ondansetron (ZOFRAN-ODT) 4 mg disintegrating tablet    ONE TOUCH ULTRA TEST test strip    ONETOUCH DELICA LANCETS FINE MISC    ranolazine (RANEXA) 500 mg 12 hr tablet    rosuvastatin (CRESTOR) 20 MG tablet    sertraline (ZOLOFT) 100 mg tablet    TAMSULOSIN HCL PO    thiamine 100 MG tablet    tiotropium (SPIRIVA) 18 mcg inhalation capsule    No Known Allergies        Objective     There were no vitals taken for this visit  There is no height or weight on file to calculate BMI  PHYSICAL EXAM:      General Appearance:   Alert, cooperative, no distress   HEENT:   Normocephalic, atraumatic, anicteric       Neck:  Supple, symmetrical, trachea midline   Lungs:   Clear to auscultation bilaterally; no rales, rhonchi or wheezing; respirations unlabored    Heart[de-identified]   Regular rate and rhythm; no murmur, rub, or gallop  Abdomen:   Soft, non-tender, non-distended; normal bowel sounds; no masses, no organomegaly    Rectal:   Deferred   Neuro:   Alert, oriented, no gross deficits, normal strength and tone   Extremities:  No cyanosis, clubbing or edema    Psych:  Normal mood and affect    Skin:  No jaundice, rashes, or lesions    Lymph nodes:  No palpable cervical lymphadenopathy        Lab Results:   No visits with results within 1 Day(s) from this visit     Latest known visit with results is:   Admission on 01/15/2020, Discharged on 01/15/2020   Component Date Value    WBC 01/15/2020 14 25*    RBC 01/15/2020 4 16     Hemoglobin 01/15/2020 13 8     Hematocrit 01/15/2020 39 7     MCV 01/15/2020 95     MCH 01/15/2020 33 2     MCHC 01/15/2020 34 8     RDW 01/15/2020 12 8     MPV 01/15/2020 11 1     Platelets 73/43/7937 59*    nRBC 01/15/2020 0     Neutrophils Relative 01/15/2020 80*    Immat GRANS % 01/15/2020 2     Lymphocytes Relative 01/15/2020 6*    Monocytes Relative 01/15/2020 12     Eosinophils Relative 01/15/2020 0     Basophils Relative 01/15/2020 0     Neutrophils Absolute 01/15/2020 11 39*    Immature Grans Absolute 01/15/2020 0 22*    Lymphocytes Absolute 01/15/2020 0 88     Monocytes Absolute 01/15/2020 1 71*    Eosinophils Absolute 01/15/2020 0 01     Basophils Absolute 01/15/2020 0 04     Protime 01/15/2020 11 5     INR 01/15/2020 1 08     PTT 01/15/2020 32     Sodium 01/15/2020 130*    Potassium 01/15/2020 3 8     Chloride 01/15/2020 93*    CO2 01/15/2020 28     ANION GAP 01/15/2020 9     BUN 01/15/2020 19     Creatinine 01/15/2020 1 09     Glucose 01/15/2020 115     Calcium 01/15/2020 9 1     AST 01/15/2020 36     ALT 01/15/2020 36     Alkaline Phosphatase 01/15/2020 73     Total Protein 01/15/2020 8 1     Albumin 01/15/2020 3 7     Total Bilirubin 01/15/2020 0 70     eGFR 01/15/2020 75     Ethanol Lvl 01/15/2020 <3     INFLUENZA A PCR 01/15/2020 None Detected     INFLUENZA B PCR 01/15/2020 None Detected     RSV PCR 01/15/2020 None Detected          Radiology Results:   Xr Chest 2 Views    Result Date: 1/16/2020  Narrative: CHEST INDICATION:  Cough and fever  COMPARISON:  11/22/2019, CT chest, abdomen and pelvis 12/22/2019 EXAM PERFORMED/VIEWS:  XR CHEST PA & LATERAL  The frontal view was performed utilizing dual energy radiographic technique  Images: 6 FINDINGS: Cardiomediastinal silhouette appears unremarkable  Status post median sternotomy  The lungs are clear  No pneumothorax or pleural effusion  Old bilateral rib fractures  Impression: No acute cardiopulmonary disease   Workstation performed: [id number]       Endoscopies:

## 2020-01-30 ENCOUNTER — PATIENT OUTREACH (OUTPATIENT)
Dept: FAMILY MEDICINE CLINIC | Facility: CLINIC | Age: 60
End: 2020-01-30

## 2020-01-30 NOTE — PROGRESS NOTES
Received phone call patient requesting information on transportation services  Also requesting business card for his PCP  Advised that Dr Ijeoma Lomeli does not have a business card  ( Pt expresses discontent with this explaination) Explained [facility] options and the availability of  lyft when coming to PCP  Information for [facility] and  Syd Roche and his PCP mailed to patients home address

## 2020-02-27 ENCOUNTER — APPOINTMENT (EMERGENCY)
Dept: RADIOLOGY | Facility: HOSPITAL | Age: 60
End: 2020-02-27
Payer: MEDICARE

## 2020-02-27 ENCOUNTER — PATIENT OUTREACH (OUTPATIENT)
Dept: FAMILY MEDICINE CLINIC | Facility: CLINIC | Age: 60
End: 2020-02-27

## 2020-02-27 ENCOUNTER — HOSPITAL ENCOUNTER (OUTPATIENT)
Facility: HOSPITAL | Age: 60
Setting detail: OBSERVATION
Discharge: LEFT BEFORE DISCHARGE | End: 2020-02-28
Attending: EMERGENCY MEDICINE | Admitting: FAMILY MEDICINE
Payer: MEDICARE

## 2020-02-27 DIAGNOSIS — S01.91XA LACERATION OF HEAD: ICD-10-CM

## 2020-02-27 DIAGNOSIS — F10.929 ALCOHOL INTOXICATION (HCC): Primary | ICD-10-CM

## 2020-02-27 LAB
ANION GAP SERPL CALCULATED.3IONS-SCNC: 10 MMOL/L (ref 4–13)
BASOPHILS # BLD AUTO: 0.14 THOUSANDS/ΜL (ref 0–0.1)
BASOPHILS NFR BLD AUTO: 2 % (ref 0–1)
BUN SERPL-MCNC: 31 MG/DL (ref 5–25)
CALCIUM SERPL-MCNC: 7.9 MG/DL (ref 8.3–10.1)
CHLORIDE SERPL-SCNC: 103 MMOL/L (ref 100–108)
CO2 SERPL-SCNC: 23 MMOL/L (ref 21–32)
CREAT SERPL-MCNC: 1.04 MG/DL (ref 0.6–1.3)
EOSINOPHIL # BLD AUTO: 0.29 THOUSAND/ΜL (ref 0–0.61)
EOSINOPHIL NFR BLD AUTO: 4 % (ref 0–6)
ERYTHROCYTE [DISTWIDTH] IN BLOOD BY AUTOMATED COUNT: 13.5 % (ref 11.6–15.1)
ETHANOL SERPL-MCNC: 425 MG/DL (ref 0–3)
GFR SERPL CREATININE-BSD FRML MDRD: 79 ML/MIN/1.73SQ M
GLUCOSE SERPL-MCNC: 104 MG/DL (ref 65–140)
GLUCOSE SERPL-MCNC: 86 MG/DL (ref 65–140)
HCT VFR BLD AUTO: 39.6 % (ref 36.5–49.3)
HGB BLD-MCNC: 13.1 G/DL (ref 12–17)
IMM GRANULOCYTES # BLD AUTO: 0.05 THOUSAND/UL (ref 0–0.2)
IMM GRANULOCYTES NFR BLD AUTO: 1 % (ref 0–2)
LYMPHOCYTES # BLD AUTO: 2.23 THOUSANDS/ΜL (ref 0.6–4.47)
LYMPHOCYTES NFR BLD AUTO: 33 % (ref 14–44)
MAGNESIUM SERPL-MCNC: 2.1 MG/DL (ref 1.6–2.6)
MCH RBC QN AUTO: 32.8 PG (ref 26.8–34.3)
MCHC RBC AUTO-ENTMCNC: 33.1 G/DL (ref 31.4–37.4)
MCV RBC AUTO: 99 FL (ref 82–98)
MONOCYTES # BLD AUTO: 0.55 THOUSAND/ΜL (ref 0.17–1.22)
MONOCYTES NFR BLD AUTO: 8 % (ref 4–12)
NEUTROPHILS # BLD AUTO: 3.56 THOUSANDS/ΜL (ref 1.85–7.62)
NEUTS SEG NFR BLD AUTO: 52 % (ref 43–75)
NRBC BLD AUTO-RTO: 0 /100 WBCS
PLATELET # BLD AUTO: 238 THOUSANDS/UL (ref 149–390)
PMV BLD AUTO: 10.4 FL (ref 8.9–12.7)
POTASSIUM SERPL-SCNC: 4.3 MMOL/L (ref 3.5–5.3)
RBC # BLD AUTO: 4 MILLION/UL (ref 3.88–5.62)
SODIUM SERPL-SCNC: 136 MMOL/L (ref 136–145)
WBC # BLD AUTO: 6.82 THOUSAND/UL (ref 4.31–10.16)

## 2020-02-27 PROCEDURE — 82948 REAGENT STRIP/BLOOD GLUCOSE: CPT

## 2020-02-27 PROCEDURE — 99285 EMERGENCY DEPT VISIT HI MDM: CPT

## 2020-02-27 PROCEDURE — 72125 CT NECK SPINE W/O DYE: CPT

## 2020-02-27 PROCEDURE — 80320 DRUG SCREEN QUANTALCOHOLS: CPT | Performed by: EMERGENCY MEDICINE

## 2020-02-27 PROCEDURE — 90471 IMMUNIZATION ADMIN: CPT

## 2020-02-27 PROCEDURE — 83735 ASSAY OF MAGNESIUM: CPT | Performed by: STUDENT IN AN ORGANIZED HEALTH CARE EDUCATION/TRAINING PROGRAM

## 2020-02-27 PROCEDURE — 80048 BASIC METABOLIC PNL TOTAL CA: CPT | Performed by: EMERGENCY MEDICINE

## 2020-02-27 PROCEDURE — 70450 CT HEAD/BRAIN W/O DYE: CPT

## 2020-02-27 PROCEDURE — 99285 EMERGENCY DEPT VISIT HI MDM: CPT | Performed by: EMERGENCY MEDICINE

## 2020-02-27 PROCEDURE — 36415 COLL VENOUS BLD VENIPUNCTURE: CPT | Performed by: EMERGENCY MEDICINE

## 2020-02-27 PROCEDURE — 90715 TDAP VACCINE 7 YRS/> IM: CPT | Performed by: EMERGENCY MEDICINE

## 2020-02-27 PROCEDURE — 85025 COMPLETE CBC W/AUTO DIFF WBC: CPT | Performed by: EMERGENCY MEDICINE

## 2020-02-27 RX ORDER — TAMSULOSIN HYDROCHLORIDE 0.4 MG/1
0.4 CAPSULE ORAL DAILY
Status: DISCONTINUED | OUTPATIENT
Start: 2020-02-27 | End: 2020-02-28 | Stop reason: HOSPADM

## 2020-02-27 RX ORDER — RANOLAZINE 500 MG/1
500 TABLET, EXTENDED RELEASE ORAL 2 TIMES DAILY
Status: DISCONTINUED | OUTPATIENT
Start: 2020-02-27 | End: 2020-02-28 | Stop reason: HOSPADM

## 2020-02-27 RX ORDER — ATORVASTATIN CALCIUM 40 MG/1
40 TABLET, FILM COATED ORAL
Status: DISCONTINUED | OUTPATIENT
Start: 2020-02-28 | End: 2020-02-28 | Stop reason: HOSPADM

## 2020-02-27 RX ORDER — ONDANSETRON 2 MG/ML
4 INJECTION INTRAMUSCULAR; INTRAVENOUS EVERY 8 HOURS PRN
Status: DISCONTINUED | OUTPATIENT
Start: 2020-02-27 | End: 2020-02-28 | Stop reason: HOSPADM

## 2020-02-27 RX ORDER — ONDANSETRON 2 MG/ML
4 INJECTION INTRAMUSCULAR; INTRAVENOUS EVERY 4 HOURS PRN
Status: DISCONTINUED | OUTPATIENT
Start: 2020-02-27 | End: 2020-02-27

## 2020-02-27 RX ORDER — AMLODIPINE BESYLATE 5 MG/1
5 TABLET ORAL DAILY
Status: DISCONTINUED | OUTPATIENT
Start: 2020-02-28 | End: 2020-02-28 | Stop reason: HOSPADM

## 2020-02-27 RX ORDER — FLUTICASONE FUROATE AND VILANTEROL 200; 25 UG/1; UG/1
1 POWDER RESPIRATORY (INHALATION) DAILY
Status: DISCONTINUED | OUTPATIENT
Start: 2020-02-28 | End: 2020-02-28 | Stop reason: HOSPADM

## 2020-02-27 RX ORDER — ACETAMINOPHEN 325 MG/1
650 TABLET ORAL EVERY 6 HOURS PRN
Status: DISCONTINUED | OUTPATIENT
Start: 2020-02-27 | End: 2020-02-28 | Stop reason: HOSPADM

## 2020-02-27 RX ORDER — CLOPIDOGREL BISULFATE 75 MG/1
75 TABLET ORAL DAILY
Status: DISCONTINUED | OUTPATIENT
Start: 2020-02-28 | End: 2020-02-28 | Stop reason: HOSPADM

## 2020-02-27 RX ORDER — ALBUTEROL SULFATE 90 UG/1
2 AEROSOL, METERED RESPIRATORY (INHALATION) EVERY 4 HOURS PRN
Status: DISCONTINUED | OUTPATIENT
Start: 2020-02-27 | End: 2020-02-28 | Stop reason: HOSPADM

## 2020-02-27 RX ORDER — FOLIC ACID 1 MG/1
1 TABLET ORAL DAILY
Status: DISCONTINUED | OUTPATIENT
Start: 2020-02-28 | End: 2020-02-28 | Stop reason: HOSPADM

## 2020-02-27 RX ORDER — NICOTINE 21 MG/24HR
1 PATCH, TRANSDERMAL 24 HOURS TRANSDERMAL DAILY
Status: DISCONTINUED | OUTPATIENT
Start: 2020-02-28 | End: 2020-02-27

## 2020-02-27 RX ORDER — SODIUM CHLORIDE 9 MG/ML
125 INJECTION, SOLUTION INTRAVENOUS CONTINUOUS
Status: DISCONTINUED | OUTPATIENT
Start: 2020-02-27 | End: 2020-02-27

## 2020-02-27 RX ORDER — NICOTINE 21 MG/24HR
21 PATCH, TRANSDERMAL 24 HOURS TRANSDERMAL DAILY
Status: DISCONTINUED | OUTPATIENT
Start: 2020-02-28 | End: 2020-02-28 | Stop reason: HOSPADM

## 2020-02-27 RX ORDER — PANTOPRAZOLE SODIUM 40 MG/1
40 TABLET, DELAYED RELEASE ORAL
Status: DISCONTINUED | OUTPATIENT
Start: 2020-02-28 | End: 2020-02-28 | Stop reason: HOSPADM

## 2020-02-27 RX ORDER — SODIUM CHLORIDE 9 MG/ML
75 INJECTION, SOLUTION INTRAVENOUS CONTINUOUS
Status: DISCONTINUED | OUTPATIENT
Start: 2020-02-27 | End: 2020-02-28 | Stop reason: HOSPADM

## 2020-02-27 RX ORDER — METOPROLOL SUCCINATE 50 MG/1
50 TABLET, EXTENDED RELEASE ORAL DAILY
Status: DISCONTINUED | OUTPATIENT
Start: 2020-02-28 | End: 2020-02-28 | Stop reason: HOSPADM

## 2020-02-27 RX ORDER — THIAMINE MONONITRATE (VIT B1) 100 MG
100 TABLET ORAL DAILY
Status: DISCONTINUED | OUTPATIENT
Start: 2020-02-28 | End: 2020-02-28 | Stop reason: HOSPADM

## 2020-02-27 RX ORDER — LISINOPRIL 10 MG/1
10 TABLET ORAL DAILY
Status: DISCONTINUED | OUTPATIENT
Start: 2020-02-28 | End: 2020-02-28 | Stop reason: HOSPADM

## 2020-02-27 RX ORDER — IPRATROPIUM BROMIDE AND ALBUTEROL SULFATE 2.5; .5 MG/3ML; MG/3ML
3 SOLUTION RESPIRATORY (INHALATION) EVERY 4 HOURS PRN
Status: DISCONTINUED | OUTPATIENT
Start: 2020-02-27 | End: 2020-02-27

## 2020-02-27 RX ADMIN — RANOLAZINE 500 MG: 500 TABLET, FILM COATED, EXTENDED RELEASE ORAL at 23:45

## 2020-02-27 RX ADMIN — FOLIC ACID 1 MG: 5 INJECTION, SOLUTION INTRAMUSCULAR; INTRAVENOUS; SUBCUTANEOUS at 23:49

## 2020-02-27 RX ADMIN — TAMSULOSIN HYDROCHLORIDE 0.4 MG: 0.4 CAPSULE ORAL at 23:44

## 2020-02-27 RX ADMIN — SODIUM CHLORIDE 75 ML/HR: 0.9 INJECTION, SOLUTION INTRAVENOUS at 23:48

## 2020-02-27 RX ADMIN — TETANUS TOXOID, REDUCED DIPHTHERIA TOXOID AND ACELLULAR PERTUSSIS VACCINE, ADSORBED 0.5 ML: 5; 2.5; 8; 8; 2.5 SUSPENSION INTRAMUSCULAR at 18:00

## 2020-02-27 NOTE — ED PROVIDER NOTES
History  Chief Complaint   Patient presents with    Fall     Pt found inside his apt, pt fell and admits to drinking vodka  pt has a noted bump/abrasion on the r sided forehead    Alcohol Intoxication     63 y/o male presents with injury to his right forehead, currently intoxicated with alcohol  Denies any LOC, no neck pain, no nausea,vomiting, no idea how he fell  He drinks everyday  Tetanus not up to date  no illicit drug use  History provided by:  Patient   used: No        Prior to Admission Medications   Prescriptions Last Dose Informant Patient Reported? Taking?    Blood Glucose Monitoring Suppl (ONE TOUCH ULTRA MINI) w/Device KIT  Self Yes No   Sig: Use as directed   ONE TOUCH ULTRA TEST test strip  Self Yes No   Sig: 3 (three) times a day Test   [facility] [place] FINE MISC  Self Yes No   Sig: 3 (three) times a day Test   TAMSULOSIN HCL PO  Self Yes No   Sig: Take 0 4 mg by mouth daily   albuterol (PROVENTIL HFA,VENTOLIN HFA) 90 mcg/act inhaler  Self No No   Sig: Inhale 2 puffs every 4 (four) hours as needed for wheezing   amLODIPine (NORVASC) 5 mg tablet  Self Yes No   Sig: Take 5 mg by mouth daily   clopidogrel (PLAVIX) 75 mg tablet  Self No No   Sig: Take 1 tablet (75 mg total) by mouth daily   esomeprazole (NexIUM) 20 mg capsule  Self No No   Sig: Take 1 capsule (20 mg total) by mouth daily   fluticasone-salmeterol (ADVAIR) 500-50 mcg/dose  Self Yes No   Sig: Inhale 1 puff every 12 (twelve) hours   folic acid (FOLVITE) 1 mg tablet  Self No No   Sig: Take 1 tablet (1 mg total) by mouth daily   fosinopril (MONOPRIL) 10 mg tablet  Self No No   Sig: Take 1 tablet (10 mg total) by mouth daily   magnesium Oxide (MAG-OX) 400 mg TABS  Self Yes No   Sig: Take 400 mg by mouth daily   metFORMIN (GLUCOPHAGE) 1000 MG tablet  Self Yes No   Sig: Take 1,000 mg by mouth 2 (two) times a day with meals   metoprolol succinate (TOPROL-XL) 100 mg 24 hr tablet  Self No No   Sig: Take 0 5 tablets (50 mg total) by mouth daily   nicotine (NICODERM CQ) 21 mg/24 hr TD 24 hr patch  Self No No   Sig: Place 1 patch on the skin daily   Patient not taking: Reported on 1/15/2020   ondansetron (ZOFRAN-ODT) 4 mg disintegrating tablet  Self No No   Sig: Take 1 tablet (4 mg total) by mouth every 6 (six) hours as needed for nausea or vomiting   ranolazine (RANEXA) 500 mg 12 hr tablet  Self No No   Sig: Take 1 tablet (500 mg total) by mouth 2 (two) times a day   rosuvastatin (CRESTOR) 20 MG tablet  Self No No   Sig: Take 1 tablet (20 mg total) by mouth daily   sertraline (ZOLOFT) 100 mg tablet  Self Yes No   Sig: Take 100 mg by mouth daily    thiamine 100 MG tablet  Self No No   Sig: Take 1 tablet (100 mg total) by mouth daily   tiotropium (SPIRIVA) 18 mcg inhalation capsule  Self Yes No   Sig: Place 18 mcg into inhaler and inhale daily      Facility-Administered Medications: None       Past Medical History:   Diagnosis Date    Cancer [facility])     prostate ca,had radiation    Cardiac disease     stents,then triple bypass    [facility]     Diabetes mellitus [facility]     ETOH abuse     Hx of heart artery stent     2014    Hyperlipidemia     Hypertension     Pneumonia     [facility]     Renal disorder     pyelonephritis    S/P CABG x 1     2004       Past Surgical History:   Procedure Laterality Date    CORONARY ARTERY BYPASS GRAFT  2004    TONSILLECTOMY         Family History   Problem Relation Age of Onset    Diabetes Mother     Uterine cancer Mother     COPD Father     Hypertension Father      I have reviewed and agree with the history as documented      E-Cigarette/Vaping     E-Cigarette/Vaping Substances     Social History     Tobacco Use    Smoking status: Current Every Day Smoker     Packs/day: 1 50     Years: 40 00     Pack years: 60 00    Smokeless tobacco: Never Used   Substance Use Topics    Alcohol use: Yes     Frequency: 4 or more times a week     Drinks per session: 10 or more     Binge frequency: Daily or almost daily     Comment: 1/5th vodka a day, states last drink 3 days ago    Drug use: No       Review of Systems   Constitutional: Negative  HENT: Negative  Eyes: Negative  Respiratory: Negative  Cardiovascular: Negative  Gastrointestinal: Negative  Endocrine: Negative  Negative for cold intolerance  Genitourinary: Negative  Musculoskeletal: Negative  Skin: Negative  Allergic/Immunologic: Negative  Neurological: Negative  Hematological: Negative  Psychiatric/Behavioral: Negative  All other systems reviewed and are negative  Physical Exam  Physical Exam   Constitutional: He is oriented to person, place, and time  He appears well-developed and well-nourished  HENT:   Head: Normocephalic and atraumatic  Small abrasion noted to right forehead area, no laceration  Eyes: Pupils are equal, round, and reactive to light  EOM are normal    Neck: Normal range of motion  Neck supple  Cardiovascular: Normal rate and regular rhythm  Pulmonary/Chest: Effort normal and breath sounds normal    Abdominal: Soft  Bowel sounds are normal    Musculoskeletal: Normal range of motion  Neurological: He is alert and oriented to person, place, and time  He displays normal reflexes  No cranial nerve deficit or sensory deficit  He exhibits normal muscle tone  Coordination normal    Skin: Skin is warm and dry  Psychiatric: He has a normal mood and affect  Nursing note and vitals reviewed        Vital Signs  ED Triage Vitals [02/27/20 1632]   Temperature Pulse Respirations Blood Pressure SpO2   (!) 97 2 °F (36 2 °C) 71 20 116/74 98 %      Temp Source Heart Rate Source Patient Position - Orthostatic VS BP Location FiO2 (%)   Tympanic Monitor Lying Left arm --      Pain Score       --           Vitals:    02/27/20 1632 02/27/20 1754 02/27/20 1900   BP: 116/74 109/61 111/60   Pulse: 71 67 72   Patient Position - Orthostatic VS: Lying Lying          Visual Acuity      ED Medications  Medications   folic acid 1 mg, thiamine (VITAMIN B1) 100 mg in sodium chloride 0 9 % 100 mL IV piggyback (has no administration in time range)   sodium chloride 0 9 % infusion (has no administration in time range)   ondansetron (ZOFRAN) injection 4 mg (has no administration in time range)   insulin lispro (HumaLOG) 100 units/mL subcutaneous injection 1-6 Units (has no administration in time range)   nicotine (NICODERM CQ) 21 mg/24 hr TD 24 hr patch 1 patch (has no administration in time range)   enoxaparin (LOVENOX) subcutaneous injection 40 mg (has no administration in time range)   tetanus-diphtheria-acellular pertussis (BOOSTRIX) IM injection 0 5 mL (0 5 mL Intramuscular Given 2/27/20 1800)       Diagnostic Studies  Results Reviewed     Procedure Component Value Units Date/Time    Platelet count [[id number]]     Lab Status:  No result Specimen:  Blood     Rapid drug screen, urine [[id number]]     Lab Status:  No result Specimen:  Urine     Ethanol [[id number]]  (Abnormal) Collected:  02/27/20 1648    Lab Status:  Final result Specimen:  Blood from Arm, Right Updated:  02/27/20 1717     Ethanol Lvl 425 mg/dL     Basic metabolic panel [[id number]]  (Abnormal) Collected:  02/27/20 1648    Lab Status:  Final result Specimen:  Blood from Arm, Right Updated:  02/27/20 1705     Sodium 136 mmol/L      Potassium 4 3 mmol/L      Chloride 103 mmol/L      CO2 23 mmol/L      ANION GAP 10 mmol/L      BUN 31 mg/dL      Creatinine 1 04 mg/dL      Glucose 104 mg/dL      Calcium 7 9 mg/dL      eGFR 79 ml/min/1 73sq m     Narrative:       [place] guidelines for Chronic Kidney Disease (CKD):     Stage 1 with normal or high GFR (GFR > 90 mL/min/1 73 square meters)    Stage 2 Mild CKD (GFR = 60-89 mL/min/1 73 square meters)    Stage 3A Moderate CKD (GFR = 45-59 mL/min/1 73 square meters)    Stage 3B Moderate CKD (GFR = 30-44 mL/min/1 73 square meters)   Stage 4 Severe CKD (GFR = 15-29 mL/min/1 73 square meters)    Stage 5 End Stage CKD (GFR <15 mL/min/1 73 square meters)  Note: GFR calculation is accurate only with a steady state creatinine    CBC and differential [[id number]]  (Abnormal) Collected:  02/27/20 1648    Lab Status:  Final result Specimen:  Blood from Arm, Right Updated:  02/27/20 1655     WBC 6 82 Thousand/uL      RBC 4 00 Million/uL      Hemoglobin 13 1 g/dL      Hematocrit 39 6 %      MCV 99 fL      MCH 32 8 pg      MCHC 33 1 g/dL      RDW 13 5 %      MPV 10 4 fL      Platelets 045 Thousands/uL      nRBC 0 /100 WBCs      Neutrophils Relative 52 %      Immat GRANS % 1 %      Lymphocytes Relative 33 %      Monocytes Relative 8 %      Eosinophils Relative 4 %      Basophils Relative 2 %      Neutrophils Absolute 3 56 Thousands/µL      Immature Grans Absolute 0 05 Thousand/uL      Lymphocytes Absolute 2 23 Thousands/µL      Monocytes Absolute 0 55 Thousand/µL      Eosinophils Absolute 0 29 Thousand/µL      Basophils Absolute 0 14 Thousands/µL                  CT head without contrast   Final Result by Rafi Hunter MD (02/27 1721)      Small right frontal scalp hematoma, without associated calvarial fracture or acute intracranial injury  Workstation performed: [id number]         CT spine cervical without contrast   Final Result by Duncan Mcduffie MD (02/27 1738)      No acute fracture or dislocation                     Workstation performed: [id number]                    Procedures  Procedures         ED Course                               MDM  Number of Diagnoses or Management Options  Alcohol intoxication ([facility] ):      Amount and/or Complexity of Data Reviewed  Clinical lab tests: ordered and reviewed  Tests in the radiology section of CPT®: reviewed and ordered  Tests in the medicine section of CPT®: ordered and reviewed    Patient Progress  Patient progress: stable        Disposition  Final diagnoses:   Alcohol intoxication ([address] )     Time reflects when diagnosis was documented in both MDM as applicable and the Disposition within this note     Time User Action Codes Description Comment    2/27/2020  7:08 PM Nadira Madden Add [F10 [id number]] Alcohol intoxication ([facility] )     2/27/2020  7:32 PM Aimee Barry Add [S01 [id number]] Laceration of head       ED Disposition     ED Disposition Condition Date/Time Comment    Admit Stable Thu Feb 27, 2020  7:08 PM          Follow-up Information    None         Patient's Medications   Discharge Prescriptions    No medications on file     No discharge procedures on file      PDMP Review     None          ED Provider  Electronically Signed by           Manju Castaneda DO  02/27/20 3809

## 2020-02-27 NOTE — ED NOTES
Pt requested for his son to be called, was able to reach him and was notified that father is in the hospital      Tianna Marie RN  02/27/20 9668

## 2020-02-28 VITALS
WEIGHT: 202.82 LBS | HEART RATE: 88 BPM | RESPIRATION RATE: 20 BRPM | SYSTOLIC BLOOD PRESSURE: 178 MMHG | TEMPERATURE: 97.8 F | DIASTOLIC BLOOD PRESSURE: 97 MMHG | OXYGEN SATURATION: 95 % | HEIGHT: 74 IN | BODY MASS INDEX: 26.03 KG/M2

## 2020-02-28 LAB
ALBUMIN SERPL BCP-MCNC: 3.3 G/DL (ref 3.5–5)
ALP SERPL-CCNC: 68 U/L (ref 46–116)
ALT SERPL W P-5'-P-CCNC: 16 U/L (ref 12–78)
AMPHETAMINES SERPL QL SCN: NEGATIVE
ANION GAP SERPL CALCULATED.3IONS-SCNC: 8 MMOL/L (ref 4–13)
AST SERPL W P-5'-P-CCNC: 18 U/L (ref 5–45)
BARBITURATES UR QL: NEGATIVE
BENZODIAZ UR QL: NEGATIVE
BILIRUB SERPL-MCNC: 0.2 MG/DL (ref 0.2–1)
BUN SERPL-MCNC: 30 MG/DL (ref 5–25)
CALCIUM SERPL-MCNC: 8 MG/DL (ref 8.3–10.1)
CHLORIDE SERPL-SCNC: 108 MMOL/L (ref 100–108)
CO2 SERPL-SCNC: 24 MMOL/L (ref 21–32)
COCAINE UR QL: NEGATIVE
CREAT SERPL-MCNC: 0.93 MG/DL (ref 0.6–1.3)
ERYTHROCYTE [DISTWIDTH] IN BLOOD BY AUTOMATED COUNT: 13.6 % (ref 11.6–15.1)
GFR SERPL CREATININE-BSD FRML MDRD: 91 ML/MIN/1.73SQ M
GLUCOSE SERPL-MCNC: 128 MG/DL (ref 65–140)
GLUCOSE SERPL-MCNC: 82 MG/DL (ref 65–140)
GLUCOSE SERPL-MCNC: 89 MG/DL (ref 65–140)
GLUCOSE SERPL-MCNC: 89 MG/DL (ref 65–140)
HCT VFR BLD AUTO: 38.7 % (ref 36.5–49.3)
HGB BLD-MCNC: 12.7 G/DL (ref 12–17)
MAGNESIUM SERPL-MCNC: 1.9 MG/DL (ref 1.6–2.6)
MCH RBC QN AUTO: 32.9 PG (ref 26.8–34.3)
MCHC RBC AUTO-ENTMCNC: 32.8 G/DL (ref 31.4–37.4)
MCV RBC AUTO: 100 FL (ref 82–98)
METHADONE UR QL: NEGATIVE
OPIATES UR QL SCN: NEGATIVE
PCP UR QL: NEGATIVE
PHOSPHATE SERPL-MCNC: 3.9 MG/DL (ref 2.7–4.5)
PLATELET # BLD AUTO: 214 THOUSANDS/UL (ref 149–390)
PMV BLD AUTO: 10.7 FL (ref 8.9–12.7)
POTASSIUM SERPL-SCNC: 4.1 MMOL/L (ref 3.5–5.3)
PROT SERPL-MCNC: 7.1 G/DL (ref 6.4–8.2)
RBC # BLD AUTO: 3.86 MILLION/UL (ref 3.88–5.62)
SODIUM SERPL-SCNC: 140 MMOL/L (ref 136–145)
THC UR QL: NEGATIVE
WBC # BLD AUTO: 6.09 THOUSAND/UL (ref 4.31–10.16)

## 2020-02-28 PROCEDURE — 82948 REAGENT STRIP/BLOOD GLUCOSE: CPT

## 2020-02-28 PROCEDURE — 99225 PR SBSQ OBSERVATION CARE/DAY 25 MINUTES: CPT | Performed by: FAMILY MEDICINE

## 2020-02-28 PROCEDURE — 85027 COMPLETE CBC AUTOMATED: CPT | Performed by: STUDENT IN AN ORGANIZED HEALTH CARE EDUCATION/TRAINING PROGRAM

## 2020-02-28 PROCEDURE — NC001 PR NO CHARGE: Performed by: FAMILY MEDICINE

## 2020-02-28 PROCEDURE — 80307 DRUG TEST PRSMV CHEM ANLYZR: CPT | Performed by: STUDENT IN AN ORGANIZED HEALTH CARE EDUCATION/TRAINING PROGRAM

## 2020-02-28 PROCEDURE — 80053 COMPREHEN METABOLIC PANEL: CPT | Performed by: STUDENT IN AN ORGANIZED HEALTH CARE EDUCATION/TRAINING PROGRAM

## 2020-02-28 PROCEDURE — 83735 ASSAY OF MAGNESIUM: CPT | Performed by: STUDENT IN AN ORGANIZED HEALTH CARE EDUCATION/TRAINING PROGRAM

## 2020-02-28 PROCEDURE — 84100 ASSAY OF PHOSPHORUS: CPT | Performed by: STUDENT IN AN ORGANIZED HEALTH CARE EDUCATION/TRAINING PROGRAM

## 2020-02-28 RX ADMIN — RANOLAZINE 500 MG: 500 TABLET, FILM COATED, EXTENDED RELEASE ORAL at 09:41

## 2020-02-28 RX ADMIN — CLOPIDOGREL BISULFATE 75 MG: 75 TABLET ORAL at 09:39

## 2020-02-28 RX ADMIN — FOLIC ACID 1 MG: 1 TABLET ORAL at 09:39

## 2020-02-28 RX ADMIN — LISINOPRIL 10 MG: 10 TABLET ORAL at 09:39

## 2020-02-28 RX ADMIN — FLUTICASONE FUROATE AND VILANTEROL TRIFENATATE 1 PUFF: 200; 25 POWDER RESPIRATORY (INHALATION) at 09:38

## 2020-02-28 RX ADMIN — Medication 100 MG: at 09:39

## 2020-02-28 RX ADMIN — ATORVASTATIN CALCIUM 40 MG: 40 TABLET, FILM COATED ORAL at 15:53

## 2020-02-28 RX ADMIN — MAGNESIUM OXIDE TAB 400 MG (241.3 MG ELEMENTAL MG) 400 MG: 400 (241.3 MG) TAB at 09:39

## 2020-02-28 RX ADMIN — SODIUM CHLORIDE 75 ML/HR: 0.9 INJECTION, SOLUTION INTRAVENOUS at 15:37

## 2020-02-28 RX ADMIN — NICOTINE 21 MG: 21 PATCH, EXTENDED RELEASE TRANSDERMAL at 09:39

## 2020-02-28 RX ADMIN — METOPROLOL SUCCINATE 50 MG: 50 TABLET, EXTENDED RELEASE ORAL at 09:39

## 2020-02-28 RX ADMIN — ACETAMINOPHEN 650 MG: 325 TABLET, FILM COATED ORAL at 09:47

## 2020-02-28 RX ADMIN — TIOTROPIUM BROMIDE 18 MCG: 18 CAPSULE ORAL; RESPIRATORY (INHALATION) at 09:38

## 2020-02-28 RX ADMIN — PANTOPRAZOLE SODIUM 40 MG: 40 TABLET, DELAYED RELEASE ORAL at 05:18

## 2020-02-28 RX ADMIN — ENOXAPARIN SODIUM 40 MG: 40 INJECTION SUBCUTANEOUS at 09:38

## 2020-02-28 RX ADMIN — AMLODIPINE BESYLATE 5 MG: 5 TABLET ORAL at 09:39

## 2020-02-28 NOTE — ASSESSMENT & PLAN NOTE
Ethanol 425 POA  -initiate CIWA protocol  -s/p thiamine and folate IV, cont daily thiamine and folate

## 2020-02-28 NOTE — UTILIZATION REVIEW
Initial Clinical Review    Admission: Date/Time/Statement: Admission Orders (From admission, onward)     Ordered        02/27/20 2005  Place in Observation (expected length of stay for this patient is less than two midnights)  Once         02/27/20 1907  Place in Observation (expected length of stay for this patient is less than two midnights)  Once                   Orders Placed This Encounter   Procedures    Place in Observation (expected length of stay for this patient is less than two midnights)     Standing Status:   Standing     Number of Occurrences:   1     Order Specific Question:   Admitting Physician     Answer:   Bautista Tolbert [id number]     Order Specific Question:   Level of Care     Answer:   Med Surg [16]    Place in Observation (expected length of stay for this patient is less than two midnights)     Standing Status:   Standing     Number of Occurrences:   1     Order Specific Question:   Admitting Physician     Answer:   Salinas Barrios     Order Specific Question:   Level of Care     Answer:   Med Surg [16]     ED Arrival Information     Expected Arrival Acuity Means of Arrival Escorted By Service Admission Type    - 2/27/2020 16:29 [facility]    Arrival Complaint    Fall;Intoxicated        Chief Complaint   Patient presents with    Fall     Pt found inside his apt, pt fell and admits to drinking vodka  pt has a noted bump/abrasion on the r sided forehead    Alcohol Intoxication     Assessment/Plan:   Assessment:  68-year-old male with a past medical history of CAD status post CABG, hypertension, hyperlipidemia, diabetes, history of prostate cancer status post radiation presents with acute alcohol intoxication who is being admitted under the service of Dr Adolfo Dent and is expected to stay less than 2 midnights    Patient is a full code and will be discharged home once stable     64 y o  male with a past medical history of CAD status post CABG, COPD, diabetes, alcohol abuse, prostate cancer status post radiation, hypertension, hyperlipidemia, depression presents to the ER with acute alcohol intoxication  Patient was found in his apartment on the floor  Patient does not remember how he fell    He states he has been drinking vodka of on a daily basis      Plan:  Nicotine abuse  Assessment & Plan  Nicotine patch 21mg daily  -smoking cessation education provided     History of prostate cancer  Assessment & Plan  S/p radiation  -cont flomax 0 4mg daily     HLD (hyperlipidemia)  Assessment & Plan  Stable  -cont lipitor 40mg daily     Hx of CABG  Assessment & Plan  Stable  -cont plavix 75mg daily     Depression  Assessment & Plan  Stable  -cont zoloft 100mg daily     Essential (primary) hypertension  Assessment & Plan  Stable, /74 POA  -cont toprol xl 50mg daily, lisinopril 10mg daily and amlodipine 5mg daily     Type 2 diabetes mellitus (HCC)  Assessment & Plan        Lab Results   Component Value Date     HGBA1C 5 4 11/23/2019         No results for input(s): POCGLU in the last 72 hours      Blood Sugar Average: Last 72 hrs:  BG wnl POA  -hold metformin  -cont ISS algo 3 with acucchecks qachs     COPD (chronic obstructive pulmonary disease) (HCC)  Assessment & Plan  Stable  -cont spiriva and breo daily  -cont albuterol PRN     * Alcohol intoxication (HCC)  Assessment & Plan  Ethanol 425 POA  -initiate CIWA protocol  -s/p thiamine and folate IV, cont daily thiamine and folate       ED Triage Vitals   Temperature Pulse Respirations Blood Pressure SpO2   02/27/20 1632 02/27/20 1632 02/27/20 1632 02/27/20 1632 02/27/20 1632   (!) 97 2 °F (36 2 °C) 71 20 116/74 98 %      Temp Source Heart Rate Source Patient Position - Orthostatic VS BP Location FiO2 (%)   02/27/20 1632 02/27/20 1632 02/27/20 1632 02/27/20 1632 --   Tympanic Monitor Lying Left arm       Pain Score       02/27/20 2313       No Pain        Wt Readings from Last 1 Encounters:   02/27/20 92 kg (202 lb 13 2 oz)     Additional Vital Signs:   Pertinent Labs/Diagnostic Test Results:   Results from last 7 days   Lab Units 02/28/20  0504 02/27/20  1648   WBC Thousand/uL 6 09 6 82   HEMOGLOBIN g/dL 12 7 13 1   HEMATOCRIT % 38 7 39 6   PLATELETS Thousands/uL 214 238   NEUTROS ABS Thousands/µL  --  3 56         Results from last 7 days   Lab Units 02/28/20  0504 02/27/20  1648   SODIUM mmol/L 140 136   POTASSIUM mmol/L 4 1 4 3   CHLORIDE mmol/L 108 103   CO2 mmol/L 24 23   ANION GAP mmol/L 8 10   BUN mg/dL 30* 31*   CREATININE mg/dL 0 93 1 04   EGFR ml/min/1 73sq m 91 79   CALCIUM mg/dL 8 0* 7 9*   MAGNESIUM mg/dL 1 9 2 1   PHOSPHORUS mg/dL 3 9  --      Results from last 7 days   Lab Units 02/28/20  0504   AST U/L 18   ALT U/L 16   ALK PHOS U/L 68   TOTAL PROTEIN g/dL 7 1   ALBUMIN g/dL 3 3*   TOTAL BILIRUBIN mg/dL 0 20     Results from last 7 days   Lab Units 02/27/20  2145   POC GLUCOSE mg/dl 86     Results from last 7 days   Lab Units 02/28/20  0504 02/27/20  1648   GLUCOSE RANDOM mg/dL 89 104     Results from last 7 days   Lab Units 02/27/20  1648   ETHANOL LVL mg/dL 425*     ED Treatment:   Medication Administration from 02/27/2020 1629 to 02/27/2020 1958       Date/Time Order Dose Route Action Action by Comments     02/27/2020 1800 tetanus-diphtheria-acellular pertussis (BOOSTRIX) IM injection 0 5 mL 0 5 mL Intramuscular Given Merlin Bernardo RN         Past Medical History:   Diagnosis Date    Cancer [facility])     prostate ca,had radiation    Cardiac disease     stents,then triple bypass    [facility]     Diabetes mellitus [facility]     ETOH abuse     Hx of heart artery stent     2014    Hyperlipidemia     Hypertension     Pneumonia     Prostate CA [facility]     Renal disorder     pyelonephritis    S/P CABG x 1     2004     Present on Admission:   COPD (chronic obstructive pulmonary disease) ([facility]   Type 2 diabetes mellitus ([facility] )   Essential (primary) hypertension   Alcohol intoxication ([facility] )   Depression   HLD (hyperlipidemia)   Nicotine abuse      Admitting Diagnosis: Alcohol intoxication ([facility] ) [F10 929]  Laceration of head [S01 91XA]  Abrasion of head [S00 91XA]  Age/Sex: 64 y o  male  Admission Orders:  CIWA  Scheduled Medications:    Medications:  amLODIPine 5 mg Oral Daily   atorvastatin 40 mg Oral Daily With Dinner   clopidogrel 75 mg Oral Daily   enoxaparin 40 mg Subcutaneous Daily   fluticasone-vilanterol 1 puff Inhalation Daily   folic acid 1 mg Oral Daily   insulin lispro 1-6 Units Subcutaneous TID AC   insulin lispro 1-6 Units Subcutaneous HS   lisinopril 10 mg Oral Daily   magnesium oxide 400 mg Oral Daily   metoprolol succinate 50 mg Oral Daily   nicotine 21 mg Transdermal Daily   pantoprazole 40 mg Oral Early Morning   ranolazine 500 mg Oral BID   tamsulosin 0 4 mg Oral Daily   thiamine 100 mg Oral Daily   tiotropium 18 mcg Inhalation Daily     Continuous IV Infusions:    sodium chloride 75 mL/hr Intravenous Continuous     PRN Meds:    acetaminophen 650 mg Oral Q6H PRN   albuterol 2 puff Inhalation Q4H PRN   ondansetron 4 mg Intravenous Q8H PRN       IP CONSULT TO CASE MANAGEMENT    Network Utilization Review Department  [email or web address]  ATTENTION: Please call with any questions or concerns to [phone] and carefully listen to the prompts so that you are directed to the right person  All voicemails are confidential   Karthik Los all requests for admission clinical reviews, approved or denied determinations and any other requests to dedicated fax number below belonging to the campus where the patient is receiving treatment   List of dedicated fax numbers for the Facilities:  [address] DENIALS (Administrative/Medical Necessity) [phone]   [address]  (Maternity/NICU/Pediatrics) [phone]   [facility] [phone]   Myke Sanchez [phone]     Vicente Thompson [phone]   Damien Matthews 15[facility] [phone]   [facility]  [phone]   [address] [facility]  [phone]   [address] [place] [address] 1000 [facility] [phone]

## 2020-02-28 NOTE — PROGRESS NOTES
Pt insisted on leaving AMA, Dr Soraya Kerr was made aware and spoke with the pt several times  AMA form was signed and discussed  IV removed prior to pt leaving

## 2020-02-28 NOTE — PLAN OF CARE
Pt left AMA  Problem: Potential for Falls  Goal: Patient will remain free of falls  Description  INTERVENTIONS:  - Assess patient frequently for physical needs  -  Identify cognitive and physical deficits and behaviors that affect risk of falls    -  [place] fall precautions as indicated by assessment   - Educate patient/family on patient safety including physical limitations  - Instruct patient to call for assistance with activity based on assessment  - Modify environment to reduce risk of injury  - Consider OT/PT consult to assist with strengthening/mobility  2/28/2020 1748 by Blue Pérez RN  Outcome: Progressing  2/28/2020 1014 by Blue Pérez RN  Outcome: Progressing     Problem: METABOLIC, FLUID AND ELECTROLYTES - ADULT  Goal: Electrolytes maintained within normal limits  Description  INTERVENTIONS:  - Monitor labs and assess patient for signs and symptoms of electrolyte imbalances  - Administer electrolyte replacement as ordered  - Monitor response to electrolyte replacements, including repeat lab results as appropriate  - Instruct patient on fluid and nutrition as appropriate  2/28/2020 1748 by Blue Pérez RN  Outcome: Progressing  2/28/2020 1014 by Blue Pérez RN  Outcome: Progressing  Goal: Fluid balance maintained  Description  INTERVENTIONS:  - Monitor labs   - Monitor I/O and WT  - Instruct patient on fluid and nutrition as appropriate  - Assess for signs & symptoms of volume excess or deficit  2/28/2020 1748 by Blue Pérez RN  Outcome: Progressing  2/28/2020 1014 by Blue Pérez RN  Outcome: Progressing     Problem: DISCHARGE PLANNING  Goal: Discharge to home or other facility with appropriate resources  Description  INTERVENTIONS:  - Identify barriers to discharge w/patient and caregiver  - Arrange for needed discharge resources and transportation as appropriate  - Identify discharge learning needs (meds, etc )  - Arrange for interpretive services to assist at discharge as needed  - Refer to Case Management Department for coordinating discharge planning if the patient needs post-hospital services based on physician/advanced practitioner order or complex needs related to functional status, cognitive ability, or social support system   2/28/2020 1748 by Crissy Osullivan RN  Outcome: Progressing  2/28/2020 1014 by Crissy Osullivan RN  Outcome: Progressing

## 2020-02-28 NOTE — ASSESSMENT & PLAN NOTE
Lab Results   Component Value Date    HGBA1C 5 4 11/23/2019       No results for input(s): POCGLU in the last 72 hours      Blood Sugar Average: Last 72 hrs:  BG wnl POA  -hold metformin  -cont ISS algo 3 with brie sage

## 2020-02-28 NOTE — PLAN OF CARE
Problem: Potential for Falls  Goal: Patient will remain free of falls  Description  INTERVENTIONS:  - Assess patient frequently for physical needs  -  Identify cognitive and physical deficits and behaviors that affect risk of falls    -  [place] fall precautions as indicated by assessment   - Educate patient/family on patient safety including physical limitations  - Instruct patient to call for assistance with activity based on assessment  - Modify environment to reduce risk of injury  Outcome: Progressing     Problem: METABOLIC, FLUID AND ELECTROLYTES - ADULT  Goal: Electrolytes maintained within normal limits  Description  INTERVENTIONS:  - Monitor labs and assess patient for signs and symptoms of electrolyte imbalances  - Administer electrolyte replacement as ordered  - Monitor response to electrolyte replacements, including repeat lab results as appropriate  - Instruct patient on fluid and nutrition as appropriate  Outcome: Progressing  Goal: Fluid balance maintained  Description  INTERVENTIONS:  - Monitor labs   - Monitor I/O   - Instruct patient on fluid and nutrition as appropriate  - Assess for signs & symptoms of volume excess or deficit  Outcome: Progressing     Problem: DISCHARGE PLANNING  Goal: Discharge to home or other facility with appropriate resources  Description  INTERVENTIONS:  - Identify discharge learning needs  Outcome: Progressing

## 2020-02-28 NOTE — PLAN OF CARE
Problem: Potential for Falls  Goal: Patient will remain free of falls  Description  INTERVENTIONS:  - Assess patient frequently for physical needs  -  Identify cognitive and physical deficits and behaviors that affect risk of falls    -  [place] fall precautions as indicated by assessment   - Educate patient/family on patient safety including physical limitations  - Instruct patient to call for assistance with activity based on assessment  - Modify environment to reduce risk of injury  - Consider OT/PT consult to assist with strengthening/mobility  Outcome: Progressing     Problem: METABOLIC, FLUID AND ELECTROLYTES - ADULT  Goal: Electrolytes maintained within normal limits  Description  INTERVENTIONS:  - Monitor labs and assess patient for signs and symptoms of electrolyte imbalances  - Administer electrolyte replacement as ordered  - Monitor response to electrolyte replacements, including repeat lab results as appropriate  - Instruct patient on fluid and nutrition as appropriate  Outcome: Progressing  Goal: Fluid balance maintained  Description  INTERVENTIONS:  - Monitor labs   - Monitor I/O and WT  - Instruct patient on fluid and nutrition as appropriate  - Assess for signs & symptoms of volume excess or deficit  Outcome: Progressing     Problem: DISCHARGE PLANNING  Goal: Discharge to home or other facility with appropriate resources  Description  INTERVENTIONS:  - Identify barriers to discharge w/patient and caregiver  - Arrange for needed discharge resources and transportation as appropriate  - Identify discharge learning needs (meds, etc )  - Arrange for interpretive services to assist at discharge as needed  - Refer to Case Management Department for coordinating discharge planning if the patient needs post-hospital services based on physician/advanced practitioner order or complex needs related to functional status, cognitive ability, or social support system   Outcome: Progressing

## 2020-02-28 NOTE — DISCHARGE SUMMARY
Adalid Allen 26 Discharge Summary - Medical [place] Pandey 64 y o  male MRN: [id number]    [address] Room / Bed: [address] [place] [address],[address] [place] Encounter: [id number]    BRIEF OVERVIEW  Admitting Provider: Torrey Otto MD  Discharge Provider: Torrey Otto MD    Discharge To: Home - Left AMA - Consent signed      Outpatient Follow-Up: Adalid Cordero    Things to address at first follow up visit:   · Alcohol use disorder  · Anxiety & depression  · Nicotine dependence  · Monitoring of DM2, htn, COPD & hyperlipidemia     Labs and results pending at discharge:   none    Admission Date: 2/27/2020     Discharge Date: No discharge date for patient encounter  Primary Discharge Diagnosis  Principal Problem:    Alcohol intoxication ([facility] )  Active Problems:    COPD (chronic obstructive pulmonary disease) (HCC)    Type 2 diabetes mellitus ([facility] )    Essential (primary) hypertension    Depression    Hx of CABG    HLD (hyperlipidemia)    History of prostate cancer    Nicotine abuse  Resolved Problems:    * No resolved hospital problems  *      Secondary Discharge Diagnoses  Non adherence to medical management - Left AMA      Consulting Providers   None        DETAILS OF HOSPITAL STAY    Procedures Performed/Pertinent Test results  EtOH: 425  CIWA scores: 6->3->4->5->5    CT head without contrast   Final Result      Small right frontal scalp hematoma, without associated calvarial fracture or acute intracranial injury  Workstation performed: [id number]         CT spine cervical without contrast   Final Result      No acute fracture or dislocation                     Workstation performed: [id number]               HPI copied from H&P    Arleykendra Pandey is a 64 y o  male with a past medical history of CAD status post CABG, COPD, diabetes, alcohol abuse, prostate cancer status post radiation, hypertension, hyperlipidemia, depression presents to the ER with acute alcohol intoxication  Patient was found in his apartment on the floor  Patient does not remember how he fell  He states he has been drinking vodka of on a daily basis      PCP: Margarita Barboza MD    Hospital Course  64 y o  male with a past medical history of CAD status post CABG, COPD, diabetes, alcohol abuse, prostate cancer status post radiation, hypertension, hyperlipidemia, depression admitted for alcohol withdrawal  Patient was placed on CIWA protocol  On hospital day #1, patient appears anxious and w/ tremors  Upon further questioning patient reports that his aunt  recently  Patient admits to feeling sad about her  which led him to drink more than usual  Patient realizes that he needs a healthier way to cope with stressors  He endorses being willing to go to [facility]  On the evening of hospital day #1 R N  Notified resident that  Patient want to go home  He states that " he has to go home to close all the windows and clean the mess"  It was explained that is not medically stable for d/c yet in light of b/l UE tremors and and the fact that he is in withdrawal  The risks including but not limited to seizures, fevers, and death should he go home prematurely were explained  Patient expresses verbal understanding and signed the AMA form       * Alcohol intoxication ([address] )  Assessment & Plan  Ethanol 425 POA  -CIWA protocol initiated  -Cont daily thiamine and folat    Nicotine abuse  Assessment & Plan  Nicotine patch 21mg daily  -smoking cessation education provided     History of prostate cancer  Assessment & Plan  S/p radiation  -cont flomax 0 4mg daily     HLD (hyperlipidemia)  Assessment & Plan  Stable  -cont lipitor 40mg daily     Hx of CABG  Assessment & Plan  Stable  -cont plavix 75mg daily     Depression  Assessment & Plan  Stable  -cont zoloft 100mg daily     Essential (primary) hypertension  Assessment & Plan  Stable, /74 POA  -cont toprol xl 50mg daily, lisinopril 10mg daily and amlodipine 5mg daily     Type 2 diabetes mellitus ([facility])  Assessment & Plan  BG wnl POA  Metformin held while inpatient  ISS algo 3 with brie sage ordered     COPD (chronic obstructive pulmonary disease) ([facility])  Assessment & Plan  Stable  -cont spiriva and breo daily  -cont albuterol PRN     Physical Exam at Discharge  Constitutional: NAD   Head: Normocephalic  Right scalp abrasion - does not appear infected  Eyes: No sclera icterus  Neck: Supple  Cardiovascular: RRR, +S1, +S2  Pulmonary/Chest: Clear b/l   Abdominal: Soft  Bowel sounds are normal, non-distended, no guarding  Musculoskeletal: Normal range of motion    Neurological: AAOx3  Mild tremors b/l UE  Skin: Warm & dry    Medications   Your Medications     STOP taking these medications     STOP taking these medications    ondansetron 4 mg disintegrating tablet   Commonly known as: ZOFRAN-ODT    TAKE these medications     TAKE these medications     Morning Afternoon Evening Bedtime As Needed    albuterol 90 mcg/act inhaler   Commonly known as: PROVENTIL HFA,VENTOLIN HFA   Inhale 2 puffs every 4 (four) hours as needed for wheezing   Refills: 0          amLODIPine 5 mg tablet   Commonly known as: NORVASC   Take 5 mg by mouth daily   Refills: 0          clopidogrel 75 mg tablet   Commonly known as: Plavix   Take 1 tablet (75 mg total) by mouth daily   Refills: 0          esomeprazole 20 mg capsule   Commonly known as: NexIUM   Take 1 capsule (20 mg total) by mouth daily   Refills: 0          fluticasone-salmeterol 500-50 mcg/dose inhaler   Commonly known as: ADVAIR, WIXELA   Inhale 1 puff every 12 (twelve) hours   Refills: 0          folic acid 1 mg tablet   Commonly known as: FOLVITE   Take 1 tablet (1 mg total) by mouth daily   Refills: 1          fosinopril 10 mg tablet   Commonly known as: MONOPRIL   Take 1 tablet (10 mg total) by mouth daily   Refills: 0          magnesium Oxide 400 mg Tabs   Commonly known as: MAG-OX   Take 400 mg by mouth daily   Refills: 0 metFORMIN 1000 MG tablet   Commonly known as: GLUCOPHAGE   Take 1,000 mg by mouth 2 (two) times a day with meals   Refills: 0          metoprolol succinate 100 mg 24 hr tablet   Commonly known as: TOPROL-XL   Take 0 5 tablets (50 mg total) by mouth daily   Refills: 3          nicotine 21 mg/24 hr TD 24 hr patch   Commonly known as: [address] 1 patch on the skin daily   Refills: 0          ONE TOUCH ULTRA TEST test strip   Generic drug: glucose blood   3 (three) times a day Test   Refills: 0          ranolazine 500 mg 12 hr tablet   Commonly known as: RANEXA   Take 1 tablet (500 mg total) by mouth 2 (two) times a day   Refills: 0          rosuvastatin 20 MG tablet   Commonly known as: CRESTOR   Take 1 tablet (20 mg total) by mouth daily   Refills: 0          sertraline 100 mg tablet   Commonly known as: ZOLOFT   Take 100 mg by mouth daily   Refills: 0          TAMSULOSIN HCL PO   Take 0 4 mg by mouth daily   Refills: 0          thiamine 100 MG tablet   Take 1 tablet (100 mg total) by mouth daily   Refills: 1          tiotropium 18 mcg inhalation capsule   Commonly known as: SPIRIVA   Place 18 mcg into inhaler and inhale daily   Refills: 0         OTHER medications on file     OTHER medications on file     Morning Afternoon Evening Bedtime As Needed    ONE TOUCH ULTRA MINI w/Device Kit   Use as directed   Refills: 0          OneTouch Delica Lancets Fine Misc   3 (three) times a day Test   Refills: 0               Allergies  No Known Allergies    Diet restrictions: Diabetic & cardiac diet  Activity restrictions: none  Code Status: Level 1 - Full Code    Discharge Condition: guarded - tremors       Discharge  Statement   Patient left AMA

## 2020-02-28 NOTE — H&P
H&P Exam - Nicole Pettit 64 y o  male MRN: [id number]    Unit/Bed#: [address] [place] [address] Encounter: [id number]      Assessment/Plan     Assessment:  61-year-old male with a past medical history of CAD status post CABG, hypertension, hyperlipidemia, diabetes, history of prostate cancer status post radiation presents with acute alcohol intoxication who is being admitted under the service of Dr Claudia Alvarez and is expected to stay less than 2 midnights  Patient is a full code and will be discharged home once stable  Plan:  Nicotine abuse  Assessment & Plan  Nicotine patch 21mg daily  -smoking cessation education provided    History of prostate cancer  Assessment & Plan  S/p radiation  -cont flomax 0 4mg daily    HLD (hyperlipidemia)  Assessment & Plan  Stable  -cont lipitor 40mg daily    Hx of CABG  Assessment & Plan  Stable  -cont plavix 75mg daily    Depression  Assessment & Plan  Stable  -cont zoloft 100mg daily    Essential (primary) hypertension  Assessment & Plan  Stable, /74 POA  -cont toprol xl 50mg daily, lisinopril 10mg daily and amlodipine 5mg daily    Type 2 diabetes mellitus (HCC)  Assessment & Plan  Lab Results   Component Value Date    HGBA1C 5 4 11/23/2019       No results for input(s): POCGLU in the last 72 hours  Blood Sugar Average: Last 72 hrs:  BG wnl POA  -hold metformin  -cont ISS algo 3 with acucchecks qachs    COPD (chronic obstructive pulmonary disease) (HCC)  Assessment & Plan  Stable  -cont spiriva and breo daily  -cont albuterol PRN    * Alcohol intoxication ([address] )  Assessment & Plan  Ethanol 425 POA  -initiate CIWA protocol  -s/p thiamine and folate IV, cont daily thiamine and folate          History of Present Illness     HPI:  Nicole Pettit is a 64 y o  male with a past medical history of CAD status post CABG, COPD, diabetes, alcohol abuse, prostate cancer status post radiation, hypertension, hyperlipidemia, depression presents to the ER with acute alcohol intoxication    Patient was found in his apartment on the floor  Patient does not remember how he fell  He states he has been drinking vodka of on a daily basis  PCP: Dottie Kenyon MD    Review of Systems   Constitutional: Negative  HENT: Negative  Respiratory: Negative  Cardiovascular: Negative  Genitourinary: Negative  Musculoskeletal: Negative  Skin: Positive for wound  Limited 2/2 acute intoxication    Historical Information   Past Medical History:   Diagnosis Date    Cancer [facility])     prostate ca,had radiation    Cardiac disease     stents,then triple bypass    COPD (chronic obstructive pulmonary disease) ([place] [address] )     Diabetes mellitus ([place] [address] )     ETOH abuse     Hx of heart artery stent     2014    Hyperlipidemia     Hypertension     Pneumonia     Prostate CA ([place] [address] )     Renal disorder     pyelonephritis    S/P CABG x 1     2004     Past Surgical History:   Procedure Laterality Date    CORONARY ARTERY BYPASS GRAFT  2004    TONSILLECTOMY       Social History   Social History     Substance and Sexual Activity   Alcohol Use Yes    Frequency: 4 or more times a week    Drinks per session: 10 or more    Binge frequency: Daily or almost daily    Comment: 1/5th vodka a day, states last drink 3 days ago     Social History     Substance and Sexual Activity   Drug Use No     Social History     Tobacco Use   Smoking Status Current Every Day Smoker    Packs/day: 1 50    Years: 40 00    Pack years: 60 00   Smokeless Tobacco Never Used     E-Cigarette/Vaping      E-Cigarette/Vaping Substances     Family History:   Family History   Problem Relation Age of Onset    Diabetes Mother     Uterine cancer Mother     COPD Father     Hypertension Father        Meds/Allergies   PTA meds:   Prior to Admission Medications   Prescriptions Last Dose Informant Patient Reported? Taking?    Blood Glucose Monitoring Suppl (ONE TOUCH ULTRA MINI) w/Device KIT  Self Yes No   Sig: Use as directed   ONE TOUCH ULTRA TEST test strip Self Yes No   Sig: 3 (three) times a day Test   [facility] DELICA LANCETS FINE MISC  Self Yes No   Sig: 3 (three) times a day Test   TAMSULOSIN HCL PO  Self Yes No   Sig: Take 0 4 mg by mouth daily   albuterol (PROVENTIL HFA,VENTOLIN HFA) 90 mcg/act inhaler  Self No No   Sig: Inhale 2 puffs every 4 (four) hours as needed for wheezing   amLODIPine (NORVASC) 5 mg tablet  Self Yes No   Sig: Take 5 mg by mouth daily   clopidogrel (PLAVIX) 75 mg tablet  Self No No   Sig: Take 1 tablet (75 mg total) by mouth daily   esomeprazole (NexIUM) 20 mg capsule  Self No No   Sig: Take 1 capsule (20 mg total) by mouth daily   fluticasone-salmeterol (ADVAIR) 500-50 mcg/dose  Self Yes No   Sig: Inhale 1 puff every 12 (twelve) hours   folic acid (FOLVITE) 1 mg tablet  Self No No   Sig: Take 1 tablet (1 mg total) by mouth daily   fosinopril (MONOPRIL) 10 mg tablet  Self No No   Sig: Take 1 tablet (10 mg total) by mouth daily   magnesium Oxide (MAG-OX) 400 mg TABS  Self Yes No   Sig: Take 400 mg by mouth daily   metFORMIN (GLUCOPHAGE) 1000 MG tablet  Self Yes No   Sig: Take 1,000 mg by mouth 2 (two) times a day with meals   metoprolol succinate (TOPROL-XL) 100 mg 24 hr tablet  Self No No   Sig: Take 0 5 tablets (50 mg total) by mouth daily   nicotine (NICODERM CQ) 21 mg/24 hr TD 24 hr patch  Self No No   Sig: Place 1 patch on the skin daily   Patient not taking: Reported on 1/15/2020   ondansetron (ZOFRAN-ODT) 4 mg disintegrating tablet  Self No No   Sig: Take 1 tablet (4 mg total) by mouth every 6 (six) hours as needed for nausea or vomiting   ranolazine (RANEXA) 500 mg 12 hr tablet  Self No No   Sig: Take 1 tablet (500 mg total) by mouth 2 (two) times a day   rosuvastatin (CRESTOR) 20 MG tablet  Self No No   Sig: Take 1 tablet (20 mg total) by mouth daily   sertraline (ZOLOFT) 100 mg tablet  Self Yes No   Sig: Take 100 mg by mouth daily    thiamine 100 MG tablet  Self No No   Sig: Take 1 tablet (100 mg total) by mouth daily   tiotropium (SPIRIVA) 18 mcg inhalation capsule  Self Yes No   Sig: Place 18 mcg into inhaler and inhale daily      Facility-Administered Medications: None     No Known Allergies    Objective   Vitals: Blood pressure 101/54, pulse 68, temperature (!) 95 7 °F (35 4 °C), resp  rate 20, weight 92 kg (202 lb 13 2 oz), SpO2 96 %  No intake or output data in the 24 hours ending 02/27/20 2137    Invasive Devices     None                 Physical Exam   Constitutional: He appears well-developed and well-nourished  HENT:   Head: Normocephalic  Right scalp abrasion   Eyes: Pupils are equal, round, and reactive to light  Neck: Normal range of motion  Cardiovascular: Normal rate and regular rhythm  Pulmonary/Chest: Effort normal and breath sounds normal    Abdominal: Soft  Bowel sounds are normal    Musculoskeletal: Normal range of motion  Neurological: He is alert  Skin: Skin is warm  Lab Results: I have personally reviewed pertinent reports       Results for orders placed or performed during the hospital encounter of 02/27/20   Ethanol   Result Value Ref Range    Ethanol Lvl 425 (H) 0 - 3 mg/dL   CBC and differential   Result Value Ref Range    WBC 6 82 4 31 - 10 16 Thousand/uL    RBC 4 00 3 88 - 5 62 Million/uL    Hemoglobin 13 1 12 0 - 17 0 g/dL    Hematocrit 39 6 36 5 - 49 3 %    MCV 99 (H) 82 - 98 fL    MCH 32 8 26 8 - 34 3 pg    MCHC 33 1 31 4 - 37 4 g/dL    RDW 13 5 11 6 - 15 1 %    MPV 10 4 8 9 - 12 7 fL    Platelets 034 226 - 074 Thousands/uL    nRBC 0 /100 WBCs    Neutrophils Relative 52 43 - 75 %    Immat GRANS % 1 0 - 2 %    Lymphocytes Relative 33 14 - 44 %    Monocytes Relative 8 4 - 12 %    Eosinophils Relative 4 0 - 6 %    Basophils Relative 2 (H) 0 - 1 %    Neutrophils Absolute 3 56 1 85 - 7 62 Thousands/µL    Immature Grans Absolute 0 05 0 00 - 0 20 Thousand/uL    Lymphocytes Absolute 2 23 0 60 - 4 47 Thousands/µL    Monocytes Absolute 0 55 0 17 - 1 22 Thousand/µL    Eosinophils Absolute 0 29 0 00 - 0 61 Thousand/µL    Basophils Absolute 0 14 (H) 0 00 - 0 10 Thousands/µL   Basic metabolic panel   Result Value Ref Range    Sodium 136 136 - 145 mmol/L    Potassium 4 3 3 5 - 5 3 mmol/L    Chloride 103 100 - 108 mmol/L    CO2 23 21 - 32 mmol/L    ANION GAP 10 4 - 13 mmol/L    BUN 31 (H) 5 - 25 mg/dL    Creatinine 1 04 0 60 - 1 30 mg/dL    Glucose 104 65 - 140 mg/dL    Calcium 7 9 (L) 8 3 - 10 1 mg/dL    eGFR 79 ml/min/1 73sq m   Magnesium   Result Value Ref Range    Magnesium 2 1 1 6 - 2 6 mg/dL       Imaging: I have personally reviewed pertinent reports  CT head without contrast   Final Result      Small right frontal scalp hematoma, without associated calvarial fracture or acute intracranial injury  Workstation performed: [id number]         CT spine cervical without contrast   Final Result      No acute fracture or dislocation  Workstation performed: [id number]             EKG, Pathology, and Other Studies: I have personally reviewed pertinent reports  Code Status: Level 1 - Full Code  Advance Directive and Living Will:      Power of :    POLST:      Counseling / Coordination of Care  Total floor / unit time spent today 20 minutes  Greater than 50% of total time was spent with the patient and / or family counseling and / or coordination of care  A description of the counseling / coordination of care: Bismark Aldana

## 2020-02-28 NOTE — SOCIAL WORK
LOS - 1 day (obs)    CPR2    SW met with pt to assess needs and discuss plans  Discussed goals of making sure pt's needs are met upon discharge and that Freedom of Choice is offered  Visit was brief as pt wanted to sleep  Pt lives alone in a senior apartment complex  Pt is independent  No DME or HHC/STR history  Pt does have a nebulizer he purchased himself  Pt has history of alcohol abuse and depression  No inpatient rehab reported  Pt's PCP is Dr Stella Hernandez MD at [facility]  Per pt he has prescription coverage and has no difficulty getting his medication as prescribed  Preferred pharmacy is [facility]    Pt does not have POA/advanced directives  Offered information on and assistance with completing advanced directive  Pt declined at this time  Per pt his son, Dany Thomas, would be his healthcare representative if needed  Discussed discharge plans  Offered pt home care services, offered list of area agencies to consider and discussed agreement with [facility] [address] to initiate services to [employer] program pts within 24 hours  Pt declined services stating he doesn't need them  No other discharge needs expressed or anticipated by pt at this time  Offered support in future if needed

## 2020-02-28 NOTE — NURSING NOTE
Pt left via ambulation  Pt left AMA and signed form  IV removed prior to pt leaving  Dr Francisco Javier Francois is aware  Pt left with belongings  Risks of leaving AMA explained to pt

## 2020-03-03 ENCOUNTER — TRANSITIONAL CARE MANAGEMENT (OUTPATIENT)
Dept: FAMILY MEDICINE CLINIC | Facility: CLINIC | Age: 60
End: 2020-03-03

## 2020-03-03 ENCOUNTER — PATIENT OUTREACH (OUTPATIENT)
Dept: FAMILY MEDICINE CLINIC | Facility: CLINIC | Age: 60
End: 2020-03-03

## 2020-03-03 NOTE — PROGRESS NOTES
Returned patients call  Pt has BERNABE appointment tomorrow  Instructed patient to bring all of his pill bottles for review  States he needs a lot of refills  Pt also states he has referrals but is not sure what  He needs to do  CM will review tomorrow at appointment

## 2020-03-04 ENCOUNTER — OFFICE VISIT (OUTPATIENT)
Dept: FAMILY MEDICINE CLINIC | Facility: CLINIC | Age: 60
End: 2020-03-04
Payer: MEDICARE

## 2020-03-04 ENCOUNTER — PATIENT OUTREACH (OUTPATIENT)
Dept: FAMILY MEDICINE CLINIC | Facility: CLINIC | Age: 60
End: 2020-03-04

## 2020-03-04 VITALS
HEIGHT: 74 IN | SYSTOLIC BLOOD PRESSURE: 92 MMHG | HEART RATE: 85 BPM | RESPIRATION RATE: 18 BRPM | DIASTOLIC BLOOD PRESSURE: 62 MMHG | TEMPERATURE: 96.1 F | BODY MASS INDEX: 26.71 KG/M2 | WEIGHT: 208.1 LBS | OXYGEN SATURATION: 97 %

## 2020-03-04 DIAGNOSIS — Z09 HOSPITAL DISCHARGE FOLLOW-UP: Primary | ICD-10-CM

## 2020-03-04 DIAGNOSIS — I10 ESSENTIAL (PRIMARY) HYPERTENSION: Chronic | ICD-10-CM

## 2020-03-04 DIAGNOSIS — E11.00 TYPE 2 DIABETES MELLITUS WITH HYPEROSMOLARITY WITHOUT COMA, WITHOUT LONG-TERM CURRENT USE OF INSULIN (HCC): Chronic | ICD-10-CM

## 2020-03-04 DIAGNOSIS — I25.10 CAD (CORONARY ARTERY DISEASE): ICD-10-CM

## 2020-03-04 DIAGNOSIS — K21.9 GASTROESOPHAGEAL REFLUX DISEASE, ESOPHAGITIS PRESENCE NOT SPECIFIED: Chronic | ICD-10-CM

## 2020-03-04 PROCEDURE — 99496 TRANSJ CARE MGMT HIGH F2F 7D: CPT | Performed by: FAMILY MEDICINE

## 2020-03-04 RX ORDER — FOSINOPRIL SODIUM 10 MG/1
10 TABLET ORAL DAILY
Qty: 30 TABLET | Refills: 0 | Status: SHIPPED | OUTPATIENT
Start: 2020-03-04 | End: 2020-07-20

## 2020-03-04 RX ORDER — RANOLAZINE 500 MG/1
500 TABLET, EXTENDED RELEASE ORAL 2 TIMES DAILY
Qty: 60 TABLET | Refills: 0 | Status: SHIPPED | OUTPATIENT
Start: 2020-03-04 | End: 2020-04-03 | Stop reason: SDUPTHER

## 2020-03-04 NOTE — PROGRESS NOTES
Ramona Cisneros  64 y o   03/04/20    CC: BERNABE and medication refill  Follow up: Lab work, GI, urology     Outpatient social work worked extensively with this patient today  Ms Karen Gaona created a list of things for patient to get done including following up with GI and Urology, picking at medications, right upper quadrant ultrasound, and lab work  Social work is also in the process of working with family guidance to get patient help with alcohol abuse  Problem List Items Addressed This Visit        Digestive    GERD (gastroesophageal reflux disease) (Chronic)    Relevant Medications    esomeprazole (NexIUM) 20 mg capsule       Endocrine    Type 2 diabetes mellitus (HCC) (Chronic)    Relevant Medications    metFORMIN (GLUCOPHAGE) 1000 MG tablet       Cardiovascular and Mediastinum    Essential (primary) hypertension (Chronic)    Relevant Medications    fosinopril (MONOPRIL) 10 mg tablet    CAD (coronary artery disease) (Chronic)    Relevant Medications    ranolazine (RANEXA) 500 mg 12 hr tablet      Other Visit Diagnoses     Hospital discharge follow-up    -  Primary         Subjective:     Patient is a 60-year-old male a past medical history significant for alcohol abuse, CAD, hypertension, diabetes type 2, and hyperlipidemia presents for a transition of care appointment after hospitalization  He was hospitalized at the end of February for alcohol intoxication  During his appointment today he is currently intoxicated  He notes he had 2 drinks this morning  He took the bus  He has not made follow-up appointments with Urology for urinary difficulties or GI for right upper quadrant ultrasound  He has no current complaints and denies nausea, vomiting, headaches, dizziness, and syncope  Review of Systems  as noted in HPI    Objective:    Vitals:    03/04/20 0812   BP: 92/62   Pulse: 85   Resp: 18   Temp: (!) 96 1 °F (35 6 °C)   SpO2: 97%     Physical Exam   Constitutional: He appears well-developed   He appears lethargic  HENT:   Head: Normocephalic and atraumatic  Cardiovascular: Regular rhythm, S1 normal, S2 normal and normal heart sounds  Pulmonary/Chest: Effort normal    Neurological: He appears lethargic  Psychiatric: His speech is slurred  He is slowed  Some portions of this record may have been generated with voice recognition software  There may be translation, syntax, or grammatical errors  Occasional wrong word or "sound-a-like" substitutions may have occurred due to the inherent limitations of the voice recognition software       [address] [facility]   PGY1

## 2020-03-04 NOTE — PROGRESS NOTES
Met with patient during PCP visit  Pt admits that he has been drinking since 2 am  States he took bus to [facility] because he needs medication refills  Discussed drinking and triggers  States he had an argument with his son  Discloses that he had a son "his favorite" that was killed in an auto accident in   Discussed how this could trigger binge drinking episodes  Encouraged patient to seek help  CM will assist in this process  Pt states he wants to get his drivers  license back, mentioned something about family guidance on [facility] being involved  Reviewed all of patients medications  ( Pt had pill bottles with him as requested by CM)  Refills to be ordered by PCP  Reviewed all of patients paperwork  Gave patient a notebook  DESIREE wrote down 4 things that he had to do  1  Call and schedule an appointment with GI  Phone number provided  2  Schedule an appointment with urology, Dr Royal Schuler  Phone number provided  3  Reschedule liver ultrasound  Phone number provided  4  Get labs drawn when he gets ultrasound done  DESIREE wrote down all medications that were refilled  Lengthy conversation regarding long term effects of alcoholism  States his brother  of liver cirrhosis  CM will continue to follow up with patient

## 2020-03-09 ENCOUNTER — PATIENT OUTREACH (OUTPATIENT)
Dept: FAMILY MEDICINE CLINIC | Facility: CLINIC | Age: 60
End: 2020-03-09

## 2020-03-09 NOTE — PROGRESS NOTES
Outreach to  at [facility]  L/M requesting follow up for patient  CM contact information provided

## 2020-03-11 ENCOUNTER — PATIENT OUTREACH (OUTPATIENT)
Dept: FAMILY MEDICINE CLINIC | Facility: CLINIC | Age: 60
End: 2020-03-11

## 2020-03-12 NOTE — PROGRESS NOTES
CM received phone call from patient  Pt relates he is in need of detox services  Pt states he was cited for public drunkenness but would not elaborate  Advised that CM would assist with referral     Call placed to [facility] for detox placement  Referral provided with patient contact information  CM will follow up

## 2020-03-31 ENCOUNTER — PATIENT OUTREACH (OUTPATIENT)
Dept: FAMILY MEDICINE CLINIC | Facility: CLINIC | Age: 60
End: 2020-03-31

## 2020-04-03 DIAGNOSIS — I25.10 CAD (CORONARY ARTERY DISEASE): ICD-10-CM

## 2020-04-03 DIAGNOSIS — K21.9 GASTROESOPHAGEAL REFLUX DISEASE, ESOPHAGITIS PRESENCE NOT SPECIFIED: Chronic | ICD-10-CM

## 2020-04-03 DIAGNOSIS — E11.00 TYPE 2 DIABETES MELLITUS WITH HYPEROSMOLARITY WITHOUT COMA, WITHOUT LONG-TERM CURRENT USE OF INSULIN (HCC): Chronic | ICD-10-CM

## 2020-04-03 RX ORDER — RANOLAZINE 500 MG/1
500 TABLET, EXTENDED RELEASE ORAL 2 TIMES DAILY
Qty: 60 TABLET | Refills: 0 | Status: SHIPPED | OUTPATIENT
Start: 2020-04-03 | End: 2020-06-17 | Stop reason: SDUPTHER

## 2020-05-14 ENCOUNTER — TELEMEDICINE (OUTPATIENT)
Dept: FAMILY MEDICINE CLINIC | Facility: CLINIC | Age: 60
End: 2020-05-14
Payer: MEDICARE

## 2020-05-14 ENCOUNTER — TELEPHONE (OUTPATIENT)
Dept: FAMILY MEDICINE CLINIC | Facility: CLINIC | Age: 60
End: 2020-05-14

## 2020-05-14 DIAGNOSIS — R22.0 SWELLING ASSOCIATED WITH DENTAL STRUCTURE: Primary | ICD-10-CM

## 2020-05-14 PROCEDURE — 99442 PR PHYS/QHP TELEPHONE EVALUATION 11-20 MIN: CPT | Performed by: FAMILY MEDICINE

## 2020-05-14 RX ORDER — AMOXICILLIN 500 MG/1
500 CAPSULE ORAL EVERY 12 HOURS SCHEDULED
Qty: 20 CAPSULE | Refills: 0 | Status: SHIPPED | OUTPATIENT
Start: 2020-05-14 | End: 2020-05-24

## 2020-06-15 DIAGNOSIS — I25.10 CAD (CORONARY ARTERY DISEASE): ICD-10-CM

## 2020-06-15 DIAGNOSIS — K21.9 GASTROESOPHAGEAL REFLUX DISEASE, ESOPHAGITIS PRESENCE NOT SPECIFIED: Primary | Chronic | ICD-10-CM

## 2020-06-16 DIAGNOSIS — E11.00 TYPE 2 DIABETES MELLITUS WITH HYPEROSMOLARITY WITHOUT COMA, WITHOUT LONG-TERM CURRENT USE OF INSULIN (HCC): Primary | Chronic | ICD-10-CM

## 2020-06-17 RX ORDER — RANOLAZINE 500 MG/1
500 TABLET, EXTENDED RELEASE ORAL 2 TIMES DAILY
Qty: 60 TABLET | Refills: 0 | Status: SHIPPED | OUTPATIENT
Start: 2020-06-17 | End: 2020-08-10 | Stop reason: SDUPTHER

## 2020-07-12 ENCOUNTER — HOSPITAL ENCOUNTER (EMERGENCY)
Facility: HOSPITAL | Age: 60
Discharge: HOME | End: 2020-07-13
Attending: EMERGENCY MEDICINE | Admitting: EMERGENCY MEDICINE
Payer: MEDICARE

## 2020-07-12 DIAGNOSIS — F10.929 ALCOHOL INTOXICATION (HCC): Primary | ICD-10-CM

## 2020-07-12 LAB
ALBUMIN SERPL BCP-MCNC: 3.5 G/DL (ref 3.5–5)
ALP SERPL-CCNC: 74 U/L (ref 46–116)
ALT SERPL W P-5'-P-CCNC: 66 U/L (ref 12–78)
ANION GAP SERPL CALCULATED.3IONS-SCNC: 12 MMOL/L (ref 4–13)
AST SERPL W P-5'-P-CCNC: 77 U/L (ref 5–45)
BACTERIA UR QL AUTO: ABNORMAL /HPF
BASOPHILS # BLD AUTO: 0.09 THOUSANDS/ΜL (ref 0–0.1)
BASOPHILS NFR BLD AUTO: 2 % (ref 0–1)
BILIRUB SERPL-MCNC: 0.4 MG/DL (ref 0.2–1)
BILIRUB UR QL STRIP: NEGATIVE
BUN SERPL-MCNC: 33 MG/DL (ref 5–25)
CALCIUM SERPL-MCNC: 8.3 MG/DL (ref 8.3–10.1)
CHLORIDE SERPL-SCNC: 108 MMOL/L (ref 100–108)
CLARITY UR: CLEAR
CO2 SERPL-SCNC: 28 MMOL/L (ref 21–32)
COLOR UR: YELLOW
CREAT SERPL-MCNC: 1.35 MG/DL (ref 0.6–1.3)
EOSINOPHIL # BLD AUTO: 0.14 THOUSAND/ΜL (ref 0–0.61)
EOSINOPHIL NFR BLD AUTO: 3 % (ref 0–6)
ERYTHROCYTE [DISTWIDTH] IN BLOOD BY AUTOMATED COUNT: 13.8 % (ref 11.6–15.1)
ETHANOL SERPL-MCNC: 335 MG/DL (ref 0–3)
GFR SERPL CREATININE-BSD FRML MDRD: 57 ML/MIN/1.73SQ M
GLUCOSE SERPL-MCNC: 101 MG/DL (ref 65–140)
GLUCOSE UR STRIP-MCNC: NEGATIVE MG/DL
HCT VFR BLD AUTO: 37.7 % (ref 36.5–49.3)
HGB BLD-MCNC: 12.4 G/DL (ref 12–17)
HGB UR QL STRIP.AUTO: ABNORMAL
HYALINE CASTS #/AREA URNS LPF: ABNORMAL /LPF
IMM GRANULOCYTES # BLD AUTO: 0.02 THOUSAND/UL (ref 0–0.2)
IMM GRANULOCYTES NFR BLD AUTO: 1 % (ref 0–2)
KETONES UR STRIP-MCNC: NEGATIVE MG/DL
LEUKOCYTE ESTERASE UR QL STRIP: NEGATIVE
LIPASE SERPL-CCNC: 865 U/L (ref 73–393)
LYMPHOCYTES # BLD AUTO: 1.29 THOUSANDS/ΜL (ref 0.6–4.47)
LYMPHOCYTES NFR BLD AUTO: 29 % (ref 14–44)
MAGNESIUM SERPL-MCNC: 1.3 MG/DL (ref 1.6–2.6)
MCH RBC QN AUTO: 33 PG (ref 26.8–34.3)
MCHC RBC AUTO-ENTMCNC: 32.9 G/DL (ref 31.4–37.4)
MCV RBC AUTO: 100 FL (ref 82–98)
MONOCYTES # BLD AUTO: 0.5 THOUSAND/ΜL (ref 0.17–1.22)
MONOCYTES NFR BLD AUTO: 11 % (ref 4–12)
NEUTROPHILS # BLD AUTO: 2.37 THOUSANDS/ΜL (ref 1.85–7.62)
NEUTS SEG NFR BLD AUTO: 54 % (ref 43–75)
NITRITE UR QL STRIP: NEGATIVE
NON-SQ EPI CELLS URNS QL MICRO: ABNORMAL /HPF
NRBC BLD AUTO-RTO: 0 /100 WBCS
PH UR STRIP.AUTO: 5.5 [PH]
PLATELET # BLD AUTO: 79 THOUSANDS/UL (ref 149–390)
PMV BLD AUTO: 10.8 FL (ref 8.9–12.7)
POTASSIUM SERPL-SCNC: 4.8 MMOL/L (ref 3.5–5.3)
PROT SERPL-MCNC: 7.7 G/DL (ref 6.4–8.2)
PROT UR STRIP-MCNC: ABNORMAL MG/DL
RBC # BLD AUTO: 3.76 MILLION/UL (ref 3.88–5.62)
RBC #/AREA URNS AUTO: ABNORMAL /HPF
SODIUM SERPL-SCNC: 148 MMOL/L (ref 136–145)
SP GR UR STRIP.AUTO: 1.02 (ref 1–1.03)
TROPONIN I SERPL-MCNC: 0.02 NG/ML
UROBILINOGEN UR QL STRIP.AUTO: 0.2 E.U./DL
WBC # BLD AUTO: 4.41 THOUSAND/UL (ref 4.31–10.16)
WBC #/AREA URNS AUTO: ABNORMAL /HPF

## 2020-07-12 PROCEDURE — 96366 THER/PROPH/DIAG IV INF ADDON: CPT

## 2020-07-12 PROCEDURE — 83690 ASSAY OF LIPASE: CPT | Performed by: EMERGENCY MEDICINE

## 2020-07-12 PROCEDURE — 83735 ASSAY OF MAGNESIUM: CPT | Performed by: EMERGENCY MEDICINE

## 2020-07-12 PROCEDURE — 85025 COMPLETE CBC W/AUTO DIFF WBC: CPT | Performed by: EMERGENCY MEDICINE

## 2020-07-12 PROCEDURE — 93005 ELECTROCARDIOGRAM TRACING: CPT

## 2020-07-12 PROCEDURE — 36415 COLL VENOUS BLD VENIPUNCTURE: CPT | Performed by: EMERGENCY MEDICINE

## 2020-07-12 PROCEDURE — 81001 URINALYSIS AUTO W/SCOPE: CPT | Performed by: EMERGENCY MEDICINE

## 2020-07-12 PROCEDURE — 84484 ASSAY OF TROPONIN QUANT: CPT | Performed by: EMERGENCY MEDICINE

## 2020-07-12 PROCEDURE — 99283 EMERGENCY DEPT VISIT LOW MDM: CPT | Performed by: EMERGENCY MEDICINE

## 2020-07-12 PROCEDURE — 96361 HYDRATE IV INFUSION ADD-ON: CPT

## 2020-07-12 PROCEDURE — 99285 EMERGENCY DEPT VISIT HI MDM: CPT

## 2020-07-12 PROCEDURE — 80320 DRUG SCREEN QUANTALCOHOLS: CPT | Performed by: EMERGENCY MEDICINE

## 2020-07-12 PROCEDURE — 96365 THER/PROPH/DIAG IV INF INIT: CPT

## 2020-07-12 PROCEDURE — 80053 COMPREHEN METABOLIC PANEL: CPT | Performed by: EMERGENCY MEDICINE

## 2020-07-12 RX ORDER — ONDANSETRON 4 MG/1
4 TABLET, ORALLY DISINTEGRATING ORAL EVERY 6 HOURS PRN
COMMUNITY
End: 2020-08-10 | Stop reason: ALTCHOICE

## 2020-07-12 RX ORDER — CHLORDIAZEPOXIDE HYDROCHLORIDE 25 MG/1
50 CAPSULE, GELATIN COATED ORAL ONCE
Status: COMPLETED | OUTPATIENT
Start: 2020-07-12 | End: 2020-07-12

## 2020-07-12 RX ORDER — MAGNESIUM SULFATE 1 G/100ML
1 INJECTION INTRAVENOUS ONCE
Status: COMPLETED | OUTPATIENT
Start: 2020-07-12 | End: 2020-07-12

## 2020-07-12 RX ADMIN — SODIUM CHLORIDE 1000 ML: 0.9 INJECTION, SOLUTION INTRAVENOUS at 16:49

## 2020-07-12 RX ADMIN — MAGNESIUM SULFATE HEPTAHYDRATE 1 G: 1 INJECTION, SOLUTION INTRAVENOUS at 18:42

## 2020-07-12 RX ADMIN — CHLORDIAZEPOXIDE HYDROCHLORIDE 50 MG: 25 CAPSULE ORAL at 20:03

## 2020-07-12 NOTE — ED NOTES
Pt is resting, denies any discomfort  Denies any SI/HI        John Barton RN  07/12/20 988 Layla Alcantar RN  07/12/20 0495

## 2020-07-12 NOTE — ED NOTES
Pt is asleep, awaken by verbal stimuli  Pt made aware that urine sample is needed  O2 sat noted in 87-88 on RA when sleeping, 2L O2 applied, pulse ox 94 on 2L  MD aware        Angelica Fitzpatrick RN  07/12/20 5097

## 2020-07-12 NOTE — ED NOTES
Pt friend Christy Julio called states that he can give patient a ride home when he is D/C  Deborah's number is [phone]        Skylar Johnson RN  07/12/20 4446

## 2020-07-12 NOTE — ED PROCEDURE NOTE
PROCEDURE  ECG 12 Lead Documentation Only  Date/Time: 7/12/2020 5:03 PM  Performed by: Allison Perez DO  Authorized by: Allison Perez DO     ECG reviewed by me, the ED Provider: yes    Patient location:  ED  Interpretation:     Interpretation: normal    Rate:     ECG rate:  98    ECG rate assessment: normal    Rhythm:     Rhythm: sinus rhythm    Ectopy:     Ectopy: none    Conduction:     Conduction: normal    ST segments:     ST segments:  Normal  T waves:     T waves: normal           Allison Perez DO  07/12/20 1703

## 2020-07-12 NOTE — ED NOTES
Pt reported that he is not feeling well due to "drinking too much"  Drinks vodka every day upon waking up in morning  Today, he is feeling unwell because it was too hot  Denies of being outside  Pt is AxOx4  Denies any dizziness, no nausea, no vomiting  BLS hand tremors noted  Pt states that he one only son doesn't speak to him due to his drinking and other son passed away on 2005  Pt states that he may need a girl friend to help him sober up  Denies any CP, rep in even and unlabored  Skin is redden, warm and dry  Call bell within reach        Marlin Chávez, CHRISTIE  07/12/20 5930

## 2020-07-13 VITALS
SYSTOLIC BLOOD PRESSURE: 172 MMHG | BODY MASS INDEX: 24.52 KG/M2 | OXYGEN SATURATION: 95 % | WEIGHT: 191 LBS | RESPIRATION RATE: 20 BRPM | TEMPERATURE: 98.4 F | DIASTOLIC BLOOD PRESSURE: 90 MMHG | HEART RATE: 82 BPM

## 2020-07-13 LAB
ATRIAL RATE: 98 BPM
P AXIS: 61 DEGREES
PR INTERVAL: 130 MS
QRS AXIS: 66 DEGREES
QRSD INTERVAL: 94 MS
QT INTERVAL: 358 MS
QTC INTERVAL: 457 MS
T WAVE AXIS: 54 DEGREES
VENTRICULAR RATE: 98 BPM

## 2020-07-13 PROCEDURE — 93010 ELECTROCARDIOGRAM REPORT: CPT | Performed by: INTERNAL MEDICINE

## 2020-07-13 NOTE — ED NOTES
Pt resting in bed, watching TV  Fine tremors noted to have  NO shakiness noted        Montrell Garza RN  07/12/20 1092

## 2020-07-16 NOTE — ED PROVIDER NOTES
History  Chief Complaint   Patient presents with    Weakness - Generalized     states for a week, feels like he has no energy no other symptoms     Patient presents for evaluation of generalized weakness for the last week  States he feels like he has no energy  States often has nausea and vomiting in the morning  Admits to drinking daily  Last drink today  Knows he needs to stop and has been to rehab before  History provided by:  Patient   used: No        Prior to Admission Medications   Prescriptions Last Dose Informant Patient Reported? Taking?    Blood Glucose Monitoring Suppl (ONE TOUCH ULTRA MINI) w/Device KIT  Self Yes No   Sig: Use as directed   ONE TOUCH ULTRA TEST test strip  Self Yes No   Sig: 3 (three) times a day Test   ONETOUCH DELICA LANCETS FINE MISC  Self Yes No   Sig: 3 (three) times a day Test   TAMSULOSIN HCL PO Not Taking at Unknown time Self Yes No   Sig: Take 0 4 mg by mouth daily   albuterol (PROVENTIL HFA,VENTOLIN HFA) 90 mcg/act inhaler  Self No No   Sig: Inhale 2 puffs every 4 (four) hours as needed for wheezing   amLODIPine (NORVASC) 5 mg tablet Not Taking at Unknown time Self Yes No   Sig: Take 5 mg by mouth daily   clopidogrel (PLAVIX) 75 mg tablet  Self No No   Sig: Take 1 tablet (75 mg total) by mouth daily   esomeprazole (NexIUM) 20 mg capsule   No No   Sig: Take 1 capsule (20 mg total) by mouth daily   fluticasone-salmeterol (ADVAIR) 500-50 mcg/dose  Self Yes No   Sig: Inhale 1 puff every 12 (twelve) hours   folic acid (FOLVITE) 1 mg tablet Not Taking at Unknown time Self No No   Sig: Take 1 tablet (1 mg total) by mouth daily   Patient not taking: Reported on 7/12/2020   fosinopril (MONOPRIL) 10 mg tablet   No No   Sig: Take 1 tablet (10 mg total) by mouth daily   magnesium Oxide (MAG-OX) 400 mg TABS Not Taking at Unknown time Self Yes No   Sig: Take 400 mg by mouth daily   metFORMIN (GLUCOPHAGE) 1000 MG tablet   No No   Sig: Take 1 tablet (1,000 mg total) by mouth 2 (two) times a day with meals   metoprolol succinate (TOPROL-XL) 100 mg 24 hr tablet  Self No No   Sig: Take 0 5 tablets (50 mg total) by mouth daily   Patient taking differently: Take 100 mg by mouth daily    nicotine (NICODERM CQ) 21 mg/24 hr TD 24 hr patch  Self No No   Sig: Place 1 patch on the skin daily   Patient not taking: Reported on 1/15/2020   ondansetron (ZOFRAN-ODT) 4 mg disintegrating tablet   Yes Yes   Sig: Take 4 mg by mouth every 6 (six) hours as needed for nausea or vomiting   ranolazine (RANEXA) 500 mg 12 hr tablet   No No   Sig: Take 1 tablet (500 mg total) by mouth 2 (two) times a day   rosuvastatin (CRESTOR) 20 MG tablet  Self No No   Sig: Take 1 tablet (20 mg total) by mouth daily   sertraline (ZOLOFT) 100 mg tablet Not Taking at Unknown time Self Yes No   Sig: Take 100 mg by mouth daily    thiamine 100 MG tablet Not Taking at Unknown time Self No No   Sig: Take 1 tablet (100 mg total) by mouth daily   Patient not taking: Reported on 7/12/2020   tiotropium (SPIRIVA) 18 mcg inhalation capsule  Self Yes No   Sig: Place 18 mcg into inhaler and inhale daily      Facility-Administered Medications: None       Past Medical History:   Diagnosis Date    Cancer [facility])     prostate ca,had radiation    Cardiac disease     stents,then triple bypass    COPD (chronic obstructive pulmonary disease) [facility] [address]     Diabetes mellitus ([facility] [address]     ETOH abuse     Hx of heart artery stent     2014    Hyperlipidemia     Hypertension     Pneumonia     Prostate CA ([facility] [address]     Renal disorder     pyelonephritis    S/P CABG x 1     2004       Past Surgical History:   Procedure Laterality Date    CORONARY ARTERY BYPASS GRAFT  2004    TONSILLECTOMY         Family History   Problem Relation Age of Onset    Diabetes Mother     Uterine cancer Mother     COPD Father     Hypertension Father      I have reviewed and agree with the history as documented      E-Cigarette/Vaping    E-Cigarette Use Never User E-Cigarette/Vaping Substances     Social History     Tobacco Use    Smoking status: Current Every Day Smoker     Packs/day: 1 50     Years: 40 00     Pack years: 60 00    Smokeless tobacco: Never Used   Substance Use Topics    Alcohol use: Yes     Alcohol/week: 10 0 standard drinks     Types: 10 Shots of liquor per week     Frequency: 4 or more times a week     Drinks per session: 10 or more     Binge frequency: Daily or almost daily     Comment: states last drink this am was vodka which is his usual    Drug use: No       Review of Systems   Constitutional: Positive for fatigue  Negative for fever  Respiratory: Negative for cough and shortness of breath  Cardiovascular: Negative for chest pain  Gastrointestinal: Positive for nausea and vomiting  Negative for abdominal pain  All other systems reviewed and are negative  Physical Exam  Physical Exam   Constitutional: He is oriented to person, place, and time  No distress  HENT:   Head: Atraumatic  Mouth/Throat: Oropharynx is clear and moist    Eyes: Pupils are equal, round, and reactive to light  Cardiovascular: Normal rate, regular rhythm and intact distal pulses  Pulmonary/Chest: Effort normal and breath sounds normal  No respiratory distress  He has no wheezes  He has no rales  Abdominal: Soft  Bowel sounds are normal  He exhibits no distension  There is no tenderness  There is no rebound and no guarding  Musculoskeletal: Normal range of motion  Neurological: He is alert and oriented to person, place, and time  Skin: Capillary refill takes less than 2 seconds  He is not diaphoretic  Nursing note and vitals reviewed        Vital Signs  ED Triage Vitals [07/12/20 1605]   Temperature Pulse Respirations Blood Pressure SpO2   98 4 °F (36 9 °C) (!) 124 (!) 24 (!) 173/95 93 %      Temp Source Heart Rate Source Patient Position - Orthostatic VS BP Location FiO2 (%)   Tympanic Monitor Sitting Right arm --      Pain Score       No Pain Vitals:    07/12/20 2200 07/13/20 0100 07/13/20 0200 07/13/20 0230   BP: 149/78 161/84 167/96 (!) 172/90   Pulse: 90 80 84 82   Patient Position - Orthostatic VS:             Visual Acuity  Visual Acuity      Most Recent Value   L Pupil Size (mm)  3   R Pupil Size (mm)  3          ED Medications  Medications   sodium chloride 0 9 % bolus 1,000 mL (0 mL Intravenous Stopped 7/12/20 1841)   magnesium sulfate IVPB (premix) SOLN 1 g (0 g Intravenous Stopped 7/12/20 2115)   chlordiazePOXIDE (LIBRIUM) capsule 50 mg (50 mg Oral Given 7/12/20 2003)       Diagnostic Studies  Results Reviewed     Procedure Component Value Units Date/Time    Urine Microscopic [[id number]]  (Abnormal) Collected:  07/12/20 1935    Lab Status:  Final result Specimen:  Urine, Clean Catch Updated:  07/12/20 1951     RBC, UA None Seen /hpf      WBC, UA 1-2 /hpf      Epithelial Cells None Seen /hpf      Bacteria, UA Occasional /hpf      Hyaline Casts, UA 1-2 /lpf     UA (URINE) with reflex to Scope [[id number]]  (Abnormal) Collected:  07/12/20 1935    Lab Status:  Final result Specimen:  Urine, Clean Catch Updated:  07/12/20 1942     Color, UA Yellow     Clarity, UA Clear     Specific [place], UA 1 020     pH, UA 5 5     Leukocytes, UA Negative     Nitrite, UA Negative     Protein, UA 30 (1+) mg/dl      Glucose, UA Negative mg/dl      Ketones, UA Negative mg/dl      Urobilinogen, UA 0 2 E U /dl      Bilirubin, UA Negative     Blood, UA Moderate    Ethanol [[id number]]  (Abnormal) Collected:  07/12/20 1646    Lab Status:  Final result Specimen:  Blood from Arm, Right Updated:  07/12/20 1742     Ethanol Lvl 335 mg/dL     Troponin I [[id number]]  (Normal) Collected:  07/12/20 1646    Lab Status:  Final result Specimen:  Blood from Arm, Right Updated:  07/12/20 1731     Troponin I 0 02 ng/mL     Comprehensive metabolic panel [[id number]]  (Abnormal) Collected:  07/12/20 1646    Lab Status:  Final result Specimen:  Blood from Arm, Right Updated: 07/12/20 1723     Sodium 148 mmol/L      Potassium 4 8 mmol/L      Chloride 108 mmol/L      CO2 28 mmol/L      ANION GAP 12 mmol/L      BUN 33 mg/dL      Creatinine 1 35 mg/dL      Glucose 101 mg/dL      Calcium 8 3 mg/dL      AST 77 U/L      ALT 66 U/L      Alkaline Phosphatase 74 U/L      Total Protein 7 7 g/dL      Albumin 3 5 g/dL      Total Bilirubin 0 40 mg/dL      eGFR 57 ml/min/1 73sq m     Narrative:       [place] guidelines for Chronic Kidney Disease (CKD):     Stage 1 with normal or high GFR (GFR > 90 mL/min/1 73 square meters)    Stage 2 Mild CKD (GFR = 60-89 mL/min/1 73 square meters)    Stage 3A Moderate CKD (GFR = 45-59 mL/min/1 73 square meters)    Stage 3B Moderate CKD (GFR = 30-44 mL/min/1 73 square meters)    Stage 4 Severe CKD (GFR = 15-29 mL/min/1 73 square meters)    Stage 5 End Stage CKD (GFR <15 mL/min/1 73 square meters)  Note: GFR calculation is accurate only with a steady state creatinine    Lipase [[id number]]  (Abnormal) Collected:  07/12/20 1646    Lab Status:  Final result Specimen:  Blood from Arm, Right Updated:  07/12/20 1723     Lipase 865 u/L     Magnesium [[id number]]  (Abnormal) Collected:  07/12/20 1646    Lab Status:  Final result Specimen:  Blood from Arm, Right Updated:  07/12/20 1723     Magnesium 1 3 mg/dL     CBC and differential [[id number]]  (Abnormal) Collected:  07/12/20 1646    Lab Status:  Final result Specimen:  Blood from Arm, Right Updated:  07/12/20 1719     WBC 4 41 Thousand/uL      RBC 3 76 Million/uL      Hemoglobin 12 4 g/dL      Hematocrit 37 7 %       fL      MCH 33 0 pg      MCHC 32 9 g/dL      RDW 13 8 %      MPV 10 8 fL      Platelets 79 Thousands/uL      nRBC 0 /100 WBCs      Neutrophils Relative 54 %      Immat GRANS % 1 %      Lymphocytes Relative 29 %      Monocytes Relative 11 %      Eosinophils Relative 3 %      Basophils Relative 2 %      Neutrophils Absolute 2 37 Thousands/µL      Immature Grans Absolute 0 02 Thousand/uL      Lymphocytes Absolute 1 29 Thousands/µL      Monocytes Absolute 0 50 Thousand/µL      Eosinophils Absolute 0 14 Thousand/µL      Basophils Absolute 0 09 Thousands/µL                  No orders to display              Procedures  Procedures         ED Course  ED Course as of Jul 16 0810   Sun Jul 12, 2020   1755 Creatinine(!): 1 35       US AUDIT      Most Recent Value   Initial Alcohol Screen: US AUDIT-C    1  How often do you have a drink containing alcohol? 6 Filed at: 07/12/2020 1608   2  How many drinks containing alcohol do you have on a typical day you are drinking? 6 Filed at: 07/12/2020 1608   3a  Male UNDER 65: How often do you have five or more drinks on one occasion? 6 Filed at: 07/12/2020 1608   Audit-C Score  (!) 18 Filed at: 07/12/2020 1608                  BRIGIDA/DAST-10      Most Recent Value   How many times in the past year have you    Used an illegal drug or used a prescription medication for non-medical reasons? Never Filed at: 07/12/2020 1609                                MDM  Number of Diagnoses or Management Options  Alcohol intoxication [facility]):   Diagnosis management comments: Pulse ox 95% on RA indicating adequate oxygenation      Signed out to next provider Dr Donald Nava pending sobriety          Amount and/or Complexity of Data Reviewed  Clinical lab tests: ordered and reviewed  Decide to obtain previous medical records or to obtain history from someone other than the patient: yes  Review and summarize past medical records: yes    Patient Progress  Patient progress: stable        Disposition  Final diagnoses:   Alcohol intoxication ([address] )     Time reflects when diagnosis was documented in both MDM as applicable and the Disposition within this note     Time User Action Codes Description Comment    7/13/2020  2:26 AM Lg Madden Add [F10 979] Alcohol intoxication [facility])       ED Disposition     ED Disposition Condition Date/Time Comment    Discharge Stable Mon Jul 13, 2020 2071 [facility] discharge to home/self care              Follow-up Information     Follow up With Specialties Details Why Contact Info Additional Information    [facility] Emergency Department Emergency Medicine  If symptoms worsen [address] [address]  [phone] [facility], [place], [place], [address]          Discharge Medication List as of 7/13/2020  2:27 AM      CONTINUE these medications which have NOT CHANGED    Details   ondansetron (ZOFRAN-ODT) 4 mg disintegrating tablet Take 4 mg by mouth every 6 (six) hours as needed for nausea or vomiting, Historical Med      albuterol (PROVENTIL HFA,VENTOLIN HFA) 90 mcg/act inhaler Inhale 2 puffs every 4 (four) hours as needed for wheezing, Starting Fri 10/12/2018, Print      amLODIPine (NORVASC) 5 mg tablet Take 5 mg by mouth daily, Historical Med      Blood Glucose Monitoring Suppl (ONE TOUCH ULTRA MINI) w/Device KIT Use as directed, Starting Thu 5/16/2019, Historical Med      clopidogrel (PLAVIX) 75 mg tablet Take 1 tablet (75 mg total) by mouth daily, Starting Tue 6/11/2019, Normal      esomeprazole (NexIUM) 20 mg capsule Take 1 capsule (20 mg total) by mouth daily, Starting Wed 6/17/2020, Normal      fluticasone-salmeterol (ADVAIR) 500-50 mcg/dose Inhale 1 puff every 12 (twelve) hours, Historical Med      folic acid (FOLVITE) 1 mg tablet Take 1 tablet (1 mg total) by mouth daily, Starting Wed 12/25/2019, Normal      fosinopril (MONOPRIL) 10 mg tablet Take 1 tablet (10 mg total) by mouth daily, Starting Wed 3/4/2020, Normal      magnesium Oxide (MAG-OX) 400 mg TABS Take 400 mg by mouth daily, Starting Fri 1/10/2020, Historical Med      metFORMIN (GLUCOPHAGE) 1000 MG tablet Take 1 tablet (1,000 mg total) by mouth 2 (two) times a day with meals, Starting Wed 6/17/2020, Until Fri 7/17/2020, Normal      metoprolol succinate (TOPROL-XL) 100 mg 24 hr tablet Take 0 5 tablets (50 mg total) by mouth daily, Starting Mon 11/25/2019, Until Thu 11/19/2020, Normal      nicotine (NICODERM CQ) 21 mg/24 hr TD 24 hr patch Place 1 patch on the skin daily, Starting Thu 12/26/2019, Normal      ONE TOUCH ULTRA TEST test strip 3 (three) times a day Test, Starting Thu 5/16/2019, Historical Med      ONETOUCH DELICA LANCETS FINE MISC 3 (three) times a day Test, Starting Thu 5/16/2019, Historical Med      ranolazine (RANEXA) 500 mg 12 hr tablet Take 1 tablet (500 mg total) by mouth 2 (two) times a day, Starting Wed 6/17/2020, Normal      rosuvastatin (CRESTOR) 20 MG tablet Take 1 tablet (20 mg total) by mouth daily, Starting Tue 6/11/2019, Normal      sertraline (ZOLOFT) 100 mg tablet Take 100 mg by mouth daily , Historical Med      TAMSULOSIN HCL PO Take 0 4 mg by mouth daily, Historical Med      thiamine 100 MG tablet Take 1 tablet (100 mg total) by mouth daily, Starting Thu 12/26/2019, Normal      tiotropium (SPIRIVA) 18 mcg inhalation capsule Place 18 mcg into inhaler and inhale daily, Historical Med           No discharge procedures on file      PDMP Review     None          ED Provider  Electronically Signed by           Bryce Concepcion DO  07/16/20 5628

## 2020-07-20 ENCOUNTER — TELEPHONE (OUTPATIENT)
Dept: FAMILY MEDICINE CLINIC | Facility: CLINIC | Age: 60
End: 2020-07-20

## 2020-07-20 DIAGNOSIS — I10 ESSENTIAL (PRIMARY) HYPERTENSION: Chronic | ICD-10-CM

## 2020-07-20 RX ORDER — FOSINOPRIL SODIUM 10 MG/1
TABLET ORAL
Qty: 30 TABLET | Refills: 0 | Status: SHIPPED | OUTPATIENT
Start: 2020-07-20 | End: 2020-08-10 | Stop reason: SDUPTHER

## 2020-07-20 NOTE — TELEPHONE ENCOUNTER
Called patient and left them a VM asking to please call back tos et up an apt regarding message below:    "Hi!      Patient seen at ED on 7/12 for weakness, found to have elevated BP  Seems like he has not taken BP med since March  He should really have an in person appointment for follow up if possible, otherwise a telemedicine appointment  I will provide 1 month refill but he should follow up at office  Thank you! - Dr Rahul Kay"      If they call back please set up an apt  Thank you!

## 2020-08-05 DIAGNOSIS — E11.00 TYPE 2 DIABETES MELLITUS WITH HYPEROSMOLARITY WITHOUT COMA, WITHOUT LONG-TERM CURRENT USE OF INSULIN (HCC): Chronic | ICD-10-CM

## 2020-08-07 DIAGNOSIS — I25.10 CAD (CORONARY ARTERY DISEASE): ICD-10-CM

## 2020-08-07 RX ORDER — CLOPIDOGREL BISULFATE 75 MG/1
75 TABLET ORAL DAILY
Qty: 30 TABLET | Refills: 0 | Status: SHIPPED | OUTPATIENT
Start: 2020-08-07 | End: 2020-08-10 | Stop reason: SDUPTHER

## 2020-08-10 ENCOUNTER — OFFICE VISIT (OUTPATIENT)
Dept: FAMILY MEDICINE CLINIC | Facility: CLINIC | Age: 60
End: 2020-08-10
Payer: MEDICARE

## 2020-08-10 VITALS
HEIGHT: 74 IN | HEART RATE: 101 BPM | BODY MASS INDEX: 24.82 KG/M2 | WEIGHT: 193.4 LBS | SYSTOLIC BLOOD PRESSURE: 142 MMHG | TEMPERATURE: 97.9 F | OXYGEN SATURATION: 97 % | DIASTOLIC BLOOD PRESSURE: 74 MMHG

## 2020-08-10 DIAGNOSIS — I25.10 CAD (CORONARY ARTERY DISEASE): ICD-10-CM

## 2020-08-10 DIAGNOSIS — E11.00 TYPE 2 DIABETES MELLITUS WITH HYPEROSMOLARITY WITHOUT COMA, WITHOUT LONG-TERM CURRENT USE OF INSULIN (HCC): Chronic | ICD-10-CM

## 2020-08-10 DIAGNOSIS — Z79.899 ENCOUNTER FOR MEDICATION REVIEW: Primary | ICD-10-CM

## 2020-08-10 DIAGNOSIS — J44.9 CHRONIC OBSTRUCTIVE PULMONARY DISEASE, UNSPECIFIED COPD TYPE (HCC): Chronic | ICD-10-CM

## 2020-08-10 DIAGNOSIS — I10 ESSENTIAL (PRIMARY) HYPERTENSION: Chronic | ICD-10-CM

## 2020-08-10 DIAGNOSIS — E78.5 HLD (HYPERLIPIDEMIA): ICD-10-CM

## 2020-08-10 DIAGNOSIS — K21.9 GASTROESOPHAGEAL REFLUX DISEASE, ESOPHAGITIS PRESENCE NOT SPECIFIED: Chronic | ICD-10-CM

## 2020-08-10 DIAGNOSIS — E83.42 HYPOMAGNESEMIA: ICD-10-CM

## 2020-08-10 PROCEDURE — 3078F DIAST BP <80 MM HG: CPT | Performed by: FAMILY MEDICINE

## 2020-08-10 PROCEDURE — 99213 OFFICE O/P EST LOW 20 MIN: CPT | Performed by: FAMILY MEDICINE

## 2020-08-10 PROCEDURE — 4004F PT TOBACCO SCREEN RCVD TLK: CPT | Performed by: FAMILY MEDICINE

## 2020-08-10 PROCEDURE — 4010F ACE/ARB THERAPY RXD/TAKEN: CPT | Performed by: FAMILY MEDICINE

## 2020-08-10 PROCEDURE — 3077F SYST BP >= 140 MM HG: CPT | Performed by: FAMILY MEDICINE

## 2020-08-10 RX ORDER — RANOLAZINE 500 MG/1
500 TABLET, EXTENDED RELEASE ORAL 2 TIMES DAILY
Qty: 60 TABLET | Refills: 3 | Status: SHIPPED | OUTPATIENT
Start: 2020-08-10 | End: 2020-12-07 | Stop reason: HOSPADM

## 2020-08-10 RX ORDER — AMLODIPINE BESYLATE 5 MG/1
5 TABLET ORAL DAILY
Qty: 30 TABLET | Refills: 0 | Status: SHIPPED | OUTPATIENT
Start: 2020-08-10 | End: 2020-09-01 | Stop reason: SDUPTHER

## 2020-08-10 RX ORDER — FOSINOPRIL SODIUM 10 MG/1
10 TABLET ORAL DAILY
Qty: 30 TABLET | Refills: 3 | Status: SHIPPED | OUTPATIENT
Start: 2020-08-10 | End: 2020-09-23 | Stop reason: SDUPTHER

## 2020-08-10 RX ORDER — METOPROLOL SUCCINATE 100 MG/1
100 TABLET, EXTENDED RELEASE ORAL DAILY
Qty: 90 TABLET | Refills: 3 | Status: SHIPPED | OUTPATIENT
Start: 2020-08-10 | End: 2020-11-23 | Stop reason: SDUPTHER

## 2020-08-10 RX ORDER — LANOLIN ALCOHOL/MO/W.PET/CERES
400 CREAM (GRAM) TOPICAL DAILY
Qty: 30 TABLET | Refills: 3 | Status: SHIPPED | OUTPATIENT
Start: 2020-08-10 | End: 2020-12-07 | Stop reason: HOSPADM

## 2020-08-10 RX ORDER — CLOPIDOGREL BISULFATE 75 MG/1
75 TABLET ORAL DAILY
Qty: 30 TABLET | Refills: 3 | Status: SHIPPED | OUTPATIENT
Start: 2020-08-10 | End: 2020-12-07 | Stop reason: HOSPADM

## 2020-08-10 RX ORDER — ROSUVASTATIN CALCIUM 20 MG/1
20 TABLET, COATED ORAL DAILY
Qty: 30 TABLET | Refills: 3 | Status: ON HOLD | OUTPATIENT
Start: 2020-08-10 | End: 2020-12-08 | Stop reason: SDUPTHER

## 2020-08-10 NOTE — PROGRESS NOTES
Mario Alberto Villalobos  64 y o   08/11/20  CC: medication refills  Follow up: 1 week    Problem List Items Addressed This Visit        Digestive    GERD (gastroesophageal reflux disease) (Chronic)       Endocrine    Type 2 diabetes mellitus (HCC) (Chronic)    Relevant Medications    metFORMIN (GLUCOPHAGE) 1000 MG tablet       Respiratory    COPD (chronic obstructive pulmonary disease) (HCC) (Chronic)    Relevant Medications    fluticasone-salmeterol (ADVAIR, WIXELA) 500-50 mcg/dose inhaler       Cardiovascular and Mediastinum    Essential (primary) hypertension (Chronic)    Relevant Medications    metoprolol succinate (TOPROL-XL) 100 mg 24 hr tablet    fosinopril (MONOPRIL) 10 mg tablet    amLODIPine (NORVASC) 5 mg tablet    CAD (coronary artery disease) (Chronic)    Relevant Medications    metoprolol succinate (TOPROL-XL) 100 mg 24 hr tablet    clopidogrel (Plavix) 75 mg tablet    ranolazine (RANEXA) 500 mg 12 hr tablet    amLODIPine (NORVASC) 5 mg tablet       Other    HLD (hyperlipidemia) (Chronic)    Relevant Medications    rosuvastatin (CRESTOR) 20 MG tablet    Hypomagnesemia    Relevant Medications    magnesium Oxide (MAG-OX) 400 mg TABS      Other Visit Diagnoses     Encounter for medication review    -  Primary       Patient to return to office next week with all refilled medications  We will review medications again  I also encouraged him to make urology appointment  Discontinued: Nexium    Subjective:   Patient is a 62-year-old male with a past medical history significant for chronic alcoholism, CAD, type 2 diabetes, hypertension, hyperlipidemia and COPD who presents today for medication refills  Patient brought all of his medications in plastic bag  We went through all of his medications one by one and corrected all inconsistencies in his chart  I also refilled all of his medications  Of note, he was not taking the amlodipine prescribed and cannot remember the last time he took it    Patient also endorses that he has been taking a PPI for many years now  He notes that he does not experience any GERD symptoms and is currently also taking Ranexa  Patient also notes that he has been having difficulty urinating for which he received a urologist referral last visit  He has not called urology office yet  Patient notes that he has been working with his son to decrease his alcohol intake  He notes that he has stopped drinking vodka completely and endorses drinking one 6 pack of beer nightly  Review of Systems   Constitutional: Negative for chills and fever  Respiratory: Negative for cough and shortness of breath  Gastrointestinal: Negative for abdominal pain, diarrhea, nausea and vomiting  Genitourinary: Positive for difficulty urinating  Skin: Negative for rash and wound  Neurological: Negative for dizziness and headaches  Objective:    /74 (BP Location: Right arm, Patient Position: Sitting, Cuff Size: Large)   Pulse 101   Temp 97 9 °F (36 6 °C) (Tympanic)   Ht 6' 2" (1 88 m)   Wt 87 7 kg (193 lb 6 4 oz)   SpO2 97%   BMI 24 83 kg/m²   Physical Exam  Constitutional:       Appearance: Normal appearance  He is normal weight  HENT:      Head: Normocephalic and atraumatic  Cardiovascular:      Rate and Rhythm: Normal rate  Pulmonary:      Effort: Pulmonary effort is normal       Breath sounds: Normal breath sounds  Skin:     General: Skin is warm and dry  Neurological:      Mental Status: He is alert  Psychiatric:         Mood and Affect: Mood normal          Behavior: Behavior normal            Some portions of this record may have been generated with voice recognition software  There may be translation, syntax, or grammatical errors  Occasional wrong word or "sound-a-like" substitutions may have occurred due to the inherent limitations of the voice recognition software       [address] [facility] Medicine   PGY2

## 2020-09-01 DIAGNOSIS — I10 ESSENTIAL (PRIMARY) HYPERTENSION: Chronic | ICD-10-CM

## 2020-09-02 RX ORDER — AMLODIPINE BESYLATE 5 MG/1
5 TABLET ORAL DAILY
Qty: 90 TABLET | Refills: 1 | Status: SHIPPED | OUTPATIENT
Start: 2020-09-02 | End: 2020-09-23 | Stop reason: SDUPTHER

## 2020-09-11 ENCOUNTER — OFFICE VISIT (OUTPATIENT)
Dept: FAMILY MEDICINE CLINIC | Facility: CLINIC | Age: 60
End: 2020-09-11
Payer: MEDICARE

## 2020-09-11 VITALS
BODY MASS INDEX: 25.15 KG/M2 | RESPIRATION RATE: 18 BRPM | WEIGHT: 196 LBS | HEART RATE: 93 BPM | TEMPERATURE: 97.5 F | HEIGHT: 74 IN | DIASTOLIC BLOOD PRESSURE: 76 MMHG | SYSTOLIC BLOOD PRESSURE: 144 MMHG | OXYGEN SATURATION: 97 %

## 2020-09-11 DIAGNOSIS — E78.5 HYPERLIPIDEMIA, UNSPECIFIED HYPERLIPIDEMIA TYPE: ICD-10-CM

## 2020-09-11 DIAGNOSIS — I25.119 CORONARY ARTERY DISEASE INVOLVING NATIVE HEART WITH ANGINA PECTORIS, UNSPECIFIED VESSEL OR LESION TYPE (HCC): Primary | ICD-10-CM

## 2020-09-11 DIAGNOSIS — E11.00 TYPE 2 DIABETES MELLITUS WITH HYPEROSMOLARITY WITHOUT COMA, WITHOUT LONG-TERM CURRENT USE OF INSULIN (HCC): ICD-10-CM

## 2020-09-11 PROCEDURE — 99214 OFFICE O/P EST MOD 30 MIN: CPT | Performed by: FAMILY MEDICINE

## 2020-09-12 LAB
ALBUMIN SERPL-MCNC: 4.3 G/DL (ref 3.8–4.9)
ALBUMIN/GLOB SERPL: 1.7 {RATIO} (ref 1.2–2.2)
ALP SERPL-CCNC: 64 IU/L (ref 39–117)
ALT SERPL-CCNC: 17 IU/L (ref 0–44)
AST SERPL-CCNC: 26 IU/L (ref 0–40)
BASOPHILS # BLD AUTO: 0.1 X10E3/UL (ref 0–0.2)
BASOPHILS NFR BLD AUTO: 1 %
BILIRUB SERPL-MCNC: 0.4 MG/DL (ref 0–1.2)
BUN SERPL-MCNC: 30 MG/DL (ref 6–24)
BUN/CREAT SERPL: 18 (ref 9–20)
CALCIUM SERPL-MCNC: 9.6 MG/DL (ref 8.7–10.2)
CHLORIDE SERPL-SCNC: 101 MMOL/L (ref 96–106)
CHOLEST SERPL-MCNC: 169 MG/DL (ref 100–199)
CO2 SERPL-SCNC: 22 MMOL/L (ref 20–29)
CREAT SERPL-MCNC: 1.63 MG/DL (ref 0.76–1.27)
EOSINOPHIL # BLD AUTO: 0.1 X10E3/UL (ref 0–0.4)
EOSINOPHIL NFR BLD AUTO: 2 %
ERYTHROCYTE [DISTWIDTH] IN BLOOD BY AUTOMATED COUNT: 13.2 % (ref 11.6–15.4)
EST. AVERAGE GLUCOSE BLD GHB EST-MCNC: 103 MG/DL
GLOBULIN SER-MCNC: 2.6 G/DL (ref 1.5–4.5)
GLUCOSE SERPL-MCNC: 97 MG/DL (ref 65–99)
HBA1C MFR BLD: 5.2 % (ref 4.8–5.6)
HCT VFR BLD AUTO: 34.4 % (ref 37.5–51)
HDLC SERPL-MCNC: 67 MG/DL
HGB BLD-MCNC: 12.3 G/DL (ref 13–17.7)
IMM GRANULOCYTES # BLD: 0 X10E3/UL (ref 0–0.1)
IMM GRANULOCYTES NFR BLD: 0 %
LDLC SERPL CALC-MCNC: 89 MG/DL (ref 0–99)
LYMPHOCYTES # BLD AUTO: 1.5 X10E3/UL (ref 0.7–3.1)
LYMPHOCYTES NFR BLD AUTO: 24 %
MCH RBC QN AUTO: 35 PG (ref 26.6–33)
MCHC RBC AUTO-ENTMCNC: 35.8 G/DL (ref 31.5–35.7)
MCV RBC AUTO: 98 FL (ref 79–97)
MONOCYTES # BLD AUTO: 0.8 X10E3/UL (ref 0.1–0.9)
MONOCYTES NFR BLD AUTO: 14 %
NEUTROPHILS # BLD AUTO: 3.7 X10E3/UL (ref 1.4–7)
NEUTROPHILS NFR BLD AUTO: 59 %
PLATELET # BLD AUTO: 191 X10E3/UL (ref 150–450)
POTASSIUM SERPL-SCNC: 5.3 MMOL/L (ref 3.5–5.2)
PROT SERPL-MCNC: 6.9 G/DL (ref 6–8.5)
RBC # BLD AUTO: 3.51 X10E6/UL (ref 4.14–5.8)
SL AMB EGFR AFRICAN AMERICAN: 53 ML/MIN/1.73
SL AMB EGFR NON AFRICAN AMERICAN: 46 ML/MIN/1.73
SL AMB VLDL CHOLESTEROL CALC: 13 MG/DL (ref 5–40)
SODIUM SERPL-SCNC: 138 MMOL/L (ref 134–144)
TRIGL SERPL-MCNC: 69 MG/DL (ref 0–149)
WBC # BLD AUTO: 6.2 X10E3/UL (ref 3.4–10.8)

## 2020-09-14 PROBLEM — Z71.6 ENCOUNTER FOR SMOKING CESSATION COUNSELING: Status: ACTIVE | Noted: 2020-09-14

## 2020-09-14 NOTE — ASSESSMENT & PLAN NOTE
Discussed extensively, patient smokes 1 5ppd  Explained that he will die if he continues this behavior especially with all his comorbidity  He cannot afford to continue smoking like this  He will come back in 2 weeks to discuss blood work and decide on what he wants for smoking cessation with his PCP

## 2020-09-14 NOTE — PROGRESS NOTES
Assessment/Plan:    Encounter for smoking cessation counseling  Discussed extensively, patient smokes 1 5ppd  Explained that he will die if he continues this behavior especially with all his comorbidity  He cannot afford to continue smoking like this  He will come back in 2 weeks to discuss blood work and decide on what he wants for smoking cessation with his PCP  Diagnoses and all orders for this visit:    Coronary artery disease involving native heart with angina pectoris, unspecified vessel or lesion type ([facility] [address] )  -     CBC and Platelet; Future    Type 2 diabetes mellitus with hyperosmolarity without coma, without long-term current use of insulin (HCC)  -     Comprehensive metabolic panel; Future  -     HEMOGLOBIN A1C W/ EAG ESTIMATION; Future    Hyperlipidemia, unspecified hyperlipidemia type  -     Lipid panel; Future          Subjective:      Patient ID: John Longo is a 64 y o  male  Patient here per Dr Gwen Prasad request to review medications he is taking again  He is also requesting blood work as is diabetic and has not had blood work in a couple of months  No other concerns at this time  The following portions of the patient's history were reviewed and updated as appropriate: allergies, current medications, past family history, past medical history, past social history, past surgical history and problem list     Review of Systems   Constitutional: Negative  HENT: Negative  Respiratory: Negative  Gastrointestinal: Negative  Musculoskeletal: Negative  Objective:      /76 (BP Location: Left arm, Patient Position: Sitting, Cuff Size: Standard)   Pulse 93   Temp 97 5 °F (36 4 °C) (Tympanic)   Resp 18   Ht 6' 2" (1 88 m)   Wt 88 9 kg (196 lb)   SpO2 97%   BMI 25 16 kg/m²          Physical Exam  Vitals signs reviewed  Constitutional:       Appearance: Normal appearance  He is normal weight  HENT:      Head: Normocephalic and atraumatic     Cardiovascular: Rate and Rhythm: Normal rate and regular rhythm  Pulmonary:      Effort: Pulmonary effort is normal       Breath sounds: Normal breath sounds  Abdominal:      General: Bowel sounds are normal    Skin:     General: Skin is warm  Neurological:      General: No focal deficit present  Mental Status: He is oriented to person, place, and time

## 2020-09-23 ENCOUNTER — OFFICE VISIT (OUTPATIENT)
Dept: FAMILY MEDICINE CLINIC | Facility: CLINIC | Age: 60
End: 2020-09-23
Payer: MEDICARE

## 2020-09-23 VITALS
HEART RATE: 85 BPM | TEMPERATURE: 98.1 F | DIASTOLIC BLOOD PRESSURE: 60 MMHG | RESPIRATION RATE: 18 BRPM | HEIGHT: 70 IN | WEIGHT: 199 LBS | SYSTOLIC BLOOD PRESSURE: 116 MMHG | BODY MASS INDEX: 28.49 KG/M2 | OXYGEN SATURATION: 98 %

## 2020-09-23 DIAGNOSIS — I10 ESSENTIAL (PRIMARY) HYPERTENSION: Chronic | ICD-10-CM

## 2020-09-23 DIAGNOSIS — D53.9 MACROCYTIC ANEMIA: ICD-10-CM

## 2020-09-23 DIAGNOSIS — Z23 ENCOUNTER FOR IMMUNIZATION: Primary | ICD-10-CM

## 2020-09-23 DIAGNOSIS — E11.00 TYPE 2 DIABETES MELLITUS WITH HYPEROSMOLARITY WITHOUT COMA, WITHOUT LONG-TERM CURRENT USE OF INSULIN (HCC): Chronic | ICD-10-CM

## 2020-09-23 DIAGNOSIS — N17.9 AKI (ACUTE KIDNEY INJURY) (HCC): ICD-10-CM

## 2020-09-23 DIAGNOSIS — R29.898 WEAKNESS OF BOTH LOWER EXTREMITIES: ICD-10-CM

## 2020-09-23 PROCEDURE — G0008 ADMIN INFLUENZA VIRUS VAC: HCPCS

## 2020-09-23 PROCEDURE — 90682 RIV4 VACC RECOMBINANT DNA IM: CPT

## 2020-09-23 PROCEDURE — 99213 OFFICE O/P EST LOW 20 MIN: CPT | Performed by: FAMILY MEDICINE

## 2020-09-23 RX ORDER — FOSINOPRIL SODIUM 10 MG/1
10 TABLET ORAL DAILY
Qty: 30 TABLET | Refills: 3 | Status: SHIPPED | OUTPATIENT
Start: 2020-09-23 | End: 2020-12-07 | Stop reason: HOSPADM

## 2020-09-23 RX ORDER — AMLODIPINE BESYLATE 5 MG/1
5 TABLET ORAL DAILY
Qty: 90 TABLET | Refills: 1 | Status: SHIPPED | OUTPATIENT
Start: 2020-09-23 | End: 2020-12-07 | Stop reason: HOSPADM

## 2020-09-23 RX ORDER — TAMSULOSIN HYDROCHLORIDE 0.4 MG/1
0.4 CAPSULE ORAL DAILY
COMMUNITY
Start: 2020-09-11 | End: 2022-06-09 | Stop reason: SDUPTHER

## 2020-09-23 RX ORDER — SULFAMETHOXAZOLE AND TRIMETHOPRIM 800; 160 MG/1; MG/1
1 TABLET ORAL 2 TIMES DAILY
COMMUNITY
Start: 2020-09-11 | End: 2020-12-07 | Stop reason: HOSPADM

## 2020-09-23 NOTE — PROGRESS NOTES
Dmitry Parker  64 y o   09/24/20    CC: leg weakness   Follow up: lab work    Problem List Items Addressed This Visit        Endocrine    Type 2 diabetes mellitus ([facility] ) (Chronic)    Relevant Medications    metFORMIN (GLUCOPHAGE) 1000 MG tablet       Cardiovascular and Mediastinum    Essential (primary) hypertension (Chronic)    Relevant Medications    amLODIPine (NORVASC) 5 mg tablet    fosinopril (MONOPRIL) 10 mg tablet      Other Visit Diagnoses     Encounter for immunization    -  Primary    Relevant Orders    FLUBLOK: influenza vaccine, quadrivalent, recombinant, PF, 0 5 mL (Completed)    Macrocytic anemia        Relevant Orders    Vitamin B12    TSH, 3rd generation with Free T4 reflex    Folate    ENMA (acute kidney injury) ([facility] )        Most recent BMP shows an elevated creatinine at 1 6  At the time of this blood draw, patient had active bladder infection  Before changing medications, we miguel    Relevant Orders    Comprehensive metabolic panel    Weakness of both lower extremities        Likely secondary to excessive alcohol use and muscle destruction, will check vitamin B12 TSH and folate  To follow up with cardiology (possible PVD?)         Subjective:   Patient is a 61-year-old male with past medical history significant for alcoholism, diastolic heart failure, CAD, hypertension, COPD, and hyperlipidemia who presents for follow-up of his multiple issues  Patient notes that he continues to have leg weakness  Weakness seems to be intermittent and is accompanied by pain that radiates from his ankles to his hips  Patient notes that pain seems to be worse at night  Patient notes that he has been drinking less (2-3x a week, 6 beers) He also acknowledges that he needs to quit smoking but is not ready to try patches or other smoking cessation aids at this time  He has no other complaints currently  Review of Systems   Constitutional: Negative for chills and fever     Respiratory: Positive for cough and shortness of breath  Cardiovascular: Negative for chest pain and palpitations  Gastrointestinal: Negative for abdominal pain, diarrhea, nausea and vomiting  Musculoskeletal: Positive for myalgias  Negative for gait problem  Skin: Negative for rash and wound  Neurological: Positive for weakness  Negative for dizziness and headaches  Objective:    /60 (BP Location: Left arm, Patient Position: Sitting, Cuff Size: Standard)   Pulse 85   Temp 98 1 °F (36 7 °C) (Tympanic)   Resp 18   Ht 5' 10" (1 778 m)   Wt 90 3 kg (199 lb)   SpO2 98%   BMI 28 55 kg/m²   Physical Exam  HENT:      Head: Normocephalic and atraumatic  Cardiovascular:      Rate and Rhythm: Normal rate and regular rhythm  Pulses: Normal pulses  no weak pulses  Pulmonary:      Breath sounds: Rhonchi (all lung fileds) and rales (all lung fields) present  Musculoskeletal:         General: No swelling or tenderness  Feet:      Right foot:      Skin integrity: No ulcer, skin breakdown, erythema, warmth, callus or dry skin  Left foot:      Skin integrity: No ulcer, skin breakdown, erythema, warmth, callus or dry skin  Skin:     General: Skin is warm and dry  Neurological:      General: No focal deficit present  Mental Status: He is alert and oriented to person, place, and time  Psychiatric:         Mood and Affect: Mood normal          Behavior: Behavior normal          Thought Content: Thought content normal          Judgment: Judgment normal              Patient's shoes and socks removed  Right Foot/Ankle   Right Foot Inspection  Skin Exam: skin normal and skin intact no dry skin, no warmth, no callus, no erythema, no maceration, no abnormal color, no pre-ulcer, no ulcer and no callus                          Toe Exam: ROM and strength within normal limits  Sensory       Monofilament testing: intact    Right Toe  - Comprehensive Exam  Ecchymosis: toe 1  Swelling: none   Tenderness: none         Left Foot/Ankle  Left Foot Inspection  Skin Exam: skin normal and skin intactno dry skin, no warmth, no erythema, no maceration, normal color, no pre-ulcer, no ulcer and no callus                         Toe Exam: ROM and strength within normal limits                   Sensory       Monofilament: intact    Left Toe  - Comprehensive Exam  Ecchymosis: none  Swelling: none   Tenderness: none       Assign Risk Category:  No deformity present; No loss of protective sensation; No weak pulses       Risk: 0      Some portions of this record may have been generated with voice recognition software  There may be translation, syntax, or grammatical errors  Occasional wrong word or "sound-a-like" substitutions may have occurred due to the inherent limitations of the voice recognition software       [address] [facility]   PGY2

## 2020-10-14 LAB
ALBUMIN SERPL-MCNC: 4.2 G/DL (ref 3.8–4.9)
ALBUMIN/GLOB SERPL: 1.5 {RATIO} (ref 1.2–2.2)
ALP SERPL-CCNC: 75 IU/L (ref 39–117)
ALT SERPL-CCNC: 17 IU/L (ref 0–44)
AST SERPL-CCNC: 26 IU/L (ref 0–40)
BILIRUB SERPL-MCNC: <0.2 MG/DL (ref 0–1.2)
BUN SERPL-MCNC: 25 MG/DL (ref 6–24)
BUN/CREAT SERPL: 18 (ref 9–20)
CALCIUM SERPL-MCNC: 9.7 MG/DL (ref 8.7–10.2)
CHLORIDE SERPL-SCNC: 101 MMOL/L (ref 96–106)
CO2 SERPL-SCNC: 18 MMOL/L (ref 20–29)
CREAT SERPL-MCNC: 1.4 MG/DL (ref 0.76–1.27)
FOLATE SERPL-MCNC: 5.7 NG/ML
GLOBULIN SER-MCNC: 2.8 G/DL (ref 1.5–4.5)
GLUCOSE SERPL-MCNC: 103 MG/DL (ref 65–99)
POTASSIUM SERPL-SCNC: 5.7 MMOL/L (ref 3.5–5.2)
PROT SERPL-MCNC: 7 G/DL (ref 6–8.5)
SL AMB EGFR AFRICAN AMERICAN: 64 ML/MIN/1.73
SL AMB EGFR NON AFRICAN AMERICAN: 55 ML/MIN/1.73
SODIUM SERPL-SCNC: 134 MMOL/L (ref 134–144)
TSH SERPL DL<=0.005 MIU/L-ACNC: 0.65 UIU/ML (ref 0.45–4.5)
VIT B12 SERPL-MCNC: 563 PG/ML (ref 232–1245)

## 2020-10-19 ENCOUNTER — EPISODE CHANGES (OUTPATIENT)
Dept: FAMILY MEDICINE CLINIC | Facility: CLINIC | Age: 60
End: 2020-10-19

## 2020-10-22 ENCOUNTER — TELEPHONE (OUTPATIENT)
Dept: FAMILY MEDICINE CLINIC | Facility: CLINIC | Age: 60
End: 2020-10-22

## 2020-11-23 DIAGNOSIS — I25.10 CAD (CORONARY ARTERY DISEASE): ICD-10-CM

## 2020-11-24 RX ORDER — METOPROLOL SUCCINATE 100 MG/1
100 TABLET, EXTENDED RELEASE ORAL DAILY
Qty: 90 TABLET | Refills: 3 | Status: SHIPPED | OUTPATIENT
Start: 2020-11-24 | End: 2020-12-07 | Stop reason: HOSPADM

## 2020-12-05 ENCOUNTER — HOSPITAL ENCOUNTER (INPATIENT)
Facility: HOSPITAL | Age: 60
LOS: 2 days | DRG: 052 | End: 2020-12-07
Attending: EMERGENCY MEDICINE | Admitting: FAMILY MEDICINE
Payer: MEDICARE

## 2020-12-05 ENCOUNTER — APPOINTMENT (EMERGENCY)
Dept: RADIOLOGY | Facility: HOSPITAL | Age: 60
DRG: 052 | End: 2020-12-05
Attending: EMERGENCY MEDICINE
Payer: MEDICARE

## 2020-12-05 ENCOUNTER — APPOINTMENT (EMERGENCY)
Dept: RADIOLOGY | Facility: HOSPITAL | Age: 60
DRG: 052 | End: 2020-12-05
Payer: MEDICARE

## 2020-12-05 ENCOUNTER — APPOINTMENT (INPATIENT)
Dept: NON INVASIVE DIAGNOSTICS | Facility: HOSPITAL | Age: 60
DRG: 052 | End: 2020-12-05
Payer: MEDICARE

## 2020-12-05 DIAGNOSIS — F10.10 ALCOHOL ABUSE: Chronic | ICD-10-CM

## 2020-12-05 DIAGNOSIS — M79.602 PAIN IN BOTH UPPER EXTREMITIES: ICD-10-CM

## 2020-12-05 DIAGNOSIS — N18.9 ACUTE ON CHRONIC KIDNEY FAILURE (HCC): ICD-10-CM

## 2020-12-05 DIAGNOSIS — E83.42 HYPOMAGNESEMIA: ICD-10-CM

## 2020-12-05 DIAGNOSIS — E87.6 HYPOKALEMIA: ICD-10-CM

## 2020-12-05 DIAGNOSIS — R20.0 NUMBNESS AND TINGLING IN BOTH HANDS: ICD-10-CM

## 2020-12-05 DIAGNOSIS — R80.9 PROTEINURIA: ICD-10-CM

## 2020-12-05 DIAGNOSIS — G54.2 CERVICAL NERVE ROOT COMPRESSION: ICD-10-CM

## 2020-12-05 DIAGNOSIS — I10 ESSENTIAL (PRIMARY) HYPERTENSION: Chronic | ICD-10-CM
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
## 2020-12-05 DIAGNOSIS — M79.601 PAIN IN BOTH UPPER EXTREMITIES: ICD-10-CM

## 2020-12-05 DIAGNOSIS — E11.00 TYPE 2 DIABETES MELLITUS WITH HYPEROSMOLARITY WITHOUT COMA, WITHOUT LONG-TERM CURRENT USE OF INSULIN (HCC): Chronic | ICD-10-CM

## 2020-12-05 DIAGNOSIS — R20.2 NUMBNESS AND TINGLING IN BOTH HANDS: ICD-10-CM

## 2020-12-05 DIAGNOSIS — I48.91 ATRIAL FIBRILLATION, UNSPECIFIED TYPE (HCC): ICD-10-CM

## 2020-12-05 DIAGNOSIS — F10.929 ALCOHOL INTOXICATION (HCC): Primary | ICD-10-CM

## 2020-12-05 DIAGNOSIS — N17.9 ACUTE ON CHRONIC KIDNEY FAILURE (HCC): ICD-10-CM

## 2020-12-05 DIAGNOSIS — E83.51 HYPOCALCEMIA: ICD-10-CM

## 2020-12-05 PROBLEM — E87.8 ELECTROLYTE ABNORMALITY: Status: ACTIVE | Noted: 2020-12-05

## 2020-12-05 PROBLEM — S01.91XA LACERATION OF HEAD: Status: ACTIVE | Noted: 2020-12-05

## 2020-12-05 LAB
ALBUMIN SERPL BCP-MCNC: 2.6 G/DL (ref 3.5–5)
ALP SERPL-CCNC: 52 U/L (ref 46–116)
ALT SERPL W P-5'-P-CCNC: 34 U/L (ref 12–78)
ANION GAP SERPL CALCULATED.3IONS-SCNC: 13 MMOL/L (ref 4–13)
ANION GAP SERPL CALCULATED.3IONS-SCNC: 6 MMOL/L (ref 4–13)
APTT PPP: 30 SECONDS (ref 23–37)
AST SERPL W P-5'-P-CCNC: 28 U/L (ref 5–45)
BACTERIA UR QL AUTO: NORMAL /HPF
BASOPHILS # BLD AUTO: 0.03 THOUSANDS/ΜL (ref 0–0.1)
BASOPHILS NFR BLD AUTO: 1 % (ref 0–1)
BILIRUB SERPL-MCNC: 0.4 MG/DL (ref 0.2–1)
BILIRUB UR QL STRIP: NEGATIVE
BUN SERPL-MCNC: 19 MG/DL (ref 5–25)
BUN SERPL-MCNC: 43 MG/DL (ref 5–25)
CALCIUM ALBUM COR SERPL-MCNC: 7.7 MG/DL (ref 8.3–10.1)
CALCIUM SERPL-MCNC: 6.6 MG/DL (ref 8.3–10.1)
CALCIUM SERPL-MCNC: 9.3 MG/DL (ref 8.3–10.1)
CHLORIDE SERPL-SCNC: 101 MMOL/L (ref 100–108)
CHLORIDE SERPL-SCNC: 103 MMOL/L (ref 100–108)
CK MB SERPL-MCNC: 1.5 % (ref 0–2.5)
CK MB SERPL-MCNC: 4 NG/ML (ref 0–5)
CK SERPL-CCNC: 272 U/L (ref 39–308)
CLARITY UR: CLEAR
CO2 SERPL-SCNC: 22 MMOL/L (ref 21–32)
CO2 SERPL-SCNC: 27 MMOL/L (ref 21–32)
COLOR UR: YELLOW
CREAT SERPL-MCNC: 1.53 MG/DL (ref 0.6–1.3)
CREAT SERPL-MCNC: 3.29 MG/DL (ref 0.6–1.3)
EOSINOPHIL # BLD AUTO: 0.13 THOUSAND/ΜL (ref 0–0.61)
EOSINOPHIL NFR BLD AUTO: 2 % (ref 0–6)
ERYTHROCYTE [DISTWIDTH] IN BLOOD BY AUTOMATED COUNT: 12.9 % (ref 11.6–15.1)
ETHANOL SERPL-MCNC: 149 MG/DL (ref 0–3)
GFR SERPL CREATININE-BSD FRML MDRD: 20 ML/MIN/1.73SQ M
GFR SERPL CREATININE-BSD FRML MDRD: 49 ML/MIN/1.73SQ M
GLUCOSE SERPL-MCNC: 106 MG/DL (ref 65–140)
GLUCOSE SERPL-MCNC: 109 MG/DL (ref 65–140)
GLUCOSE SERPL-MCNC: 231 MG/DL (ref 65–140)
GLUCOSE SERPL-MCNC: 90 MG/DL (ref 65–140)
GLUCOSE SERPL-MCNC: 97 MG/DL (ref 65–140)
GLUCOSE UR STRIP-MCNC: NEGATIVE MG/DL
HCT VFR BLD AUTO: 39.5 % (ref 36.5–49.3)
HGB BLD-MCNC: 13 G/DL (ref 12–17)
HGB UR QL STRIP.AUTO: ABNORMAL
IMM GRANULOCYTES # BLD AUTO: 0.01 THOUSAND/UL (ref 0–0.2)
IMM GRANULOCYTES NFR BLD AUTO: 0 % (ref 0–2)
INR PPP: 1.08 (ref 0.84–1.19)
KETONES UR STRIP-MCNC: NEGATIVE MG/DL
LEUKOCYTE ESTERASE UR QL STRIP: NEGATIVE
LIPASE SERPL-CCNC: 212 U/L (ref 73–393)
LYMPHOCYTES # BLD AUTO: 1.23 THOUSANDS/ΜL (ref 0.6–4.47)
LYMPHOCYTES NFR BLD AUTO: 20 % (ref 14–44)
MAGNESIUM SERPL-MCNC: 1.3 MG/DL (ref 1.6–2.6)
MAGNESIUM SERPL-MCNC: 2.1 MG/DL (ref 1.6–2.6)
MCH RBC QN AUTO: 33.2 PG (ref 26.8–34.3)
MCHC RBC AUTO-ENTMCNC: 32.9 G/DL (ref 31.4–37.4)
MCV RBC AUTO: 101 FL (ref 82–98)
MONOCYTES # BLD AUTO: 1.13 THOUSAND/ΜL (ref 0.17–1.22)
MONOCYTES NFR BLD AUTO: 19 % (ref 4–12)
NEUTROPHILS # BLD AUTO: 3.56 THOUSANDS/ΜL (ref 1.85–7.62)
NEUTS SEG NFR BLD AUTO: 58 % (ref 43–75)
NITRITE UR QL STRIP: NEGATIVE
NON-SQ EPI CELLS URNS QL MICRO: NORMAL /HPF
NRBC BLD AUTO-RTO: 0 /100 WBCS
PH UR STRIP.AUTO: 6 [PH]
PHOSPHATE SERPL-MCNC: 3.8 MG/DL (ref 2.7–4.5)
PLATELET # BLD AUTO: 60 THOUSANDS/UL (ref 149–390)
PLATELET # BLD AUTO: 66 THOUSANDS/UL (ref 149–390)
PMV BLD AUTO: 11.6 FL (ref 8.9–12.7)
PMV BLD AUTO: 12.3 FL (ref 8.9–12.7)
POTASSIUM SERPL-SCNC: 2.5 MMOL/L (ref 3.5–5.3)
POTASSIUM SERPL-SCNC: 4.9 MMOL/L (ref 3.5–5.3)
PROT SERPL-MCNC: 5.6 G/DL (ref 6.4–8.2)
PROT UR STRIP-MCNC: ABNORMAL MG/DL
PROTHROMBIN TIME: 13.9 SECONDS (ref 11.6–14.5)
RBC # BLD AUTO: 3.91 MILLION/UL (ref 3.88–5.62)
RBC #/AREA URNS AUTO: NORMAL /HPF
SODIUM SERPL-SCNC: 136 MMOL/L (ref 136–145)
SODIUM SERPL-SCNC: 136 MMOL/L (ref 136–145)
SP GR UR STRIP.AUTO: 1.01 (ref 1–1.03)
TROPONIN I SERPL-MCNC: <0.02 NG/ML
TSH SERPL DL<=0.05 MIU/L-ACNC: 0.63 UIU/ML (ref 0.36–3.74)
UROBILINOGEN UR QL STRIP.AUTO: 0.2 E.U./DL
WBC # BLD AUTO: 6.09 THOUSAND/UL (ref 4.31–10.16)
WBC #/AREA URNS AUTO: NORMAL /HPF

## 2020-12-05 PROCEDURE — 99222 1ST HOSP IP/OBS MODERATE 55: CPT | Performed by: INTERNAL MEDICINE

## 2020-12-05 PROCEDURE — 82570 ASSAY OF URINE CREATININE: CPT | Performed by: STUDENT IN AN ORGANIZED HEALTH CARE EDUCATION/TRAINING PROGRAM

## 2020-12-05 PROCEDURE — 85730 THROMBOPLASTIN TIME PARTIAL: CPT | Performed by: EMERGENCY MEDICINE

## 2020-12-05 PROCEDURE — 72125 CT NECK SPINE W/O DYE: CPT

## 2020-12-05 PROCEDURE — 83735 ASSAY OF MAGNESIUM: CPT | Performed by: EMERGENCY MEDICINE

## 2020-12-05 PROCEDURE — 96361 HYDRATE IV INFUSION ADD-ON: CPT

## 2020-12-05 PROCEDURE — 90715 TDAP VACCINE 7 YRS/> IM: CPT | Performed by: EMERGENCY MEDICINE

## 2020-12-05 PROCEDURE — 82550 ASSAY OF CK (CPK): CPT | Performed by: EMERGENCY MEDICINE

## 2020-12-05 PROCEDURE — 83690 ASSAY OF LIPASE: CPT | Performed by: EMERGENCY MEDICINE

## 2020-12-05 PROCEDURE — 80048 BASIC METABOLIC PNL TOTAL CA: CPT | Performed by: STUDENT IN AN ORGANIZED HEALTH CARE EDUCATION/TRAINING PROGRAM

## 2020-12-05 PROCEDURE — 85610 PROTHROMBIN TIME: CPT | Performed by: EMERGENCY MEDICINE

## 2020-12-05 PROCEDURE — 84100 ASSAY OF PHOSPHORUS: CPT | Performed by: STUDENT IN AN ORGANIZED HEALTH CARE EDUCATION/TRAINING PROGRAM

## 2020-12-05 PROCEDURE — 99222 1ST HOSP IP/OBS MODERATE 55: CPT | Performed by: FAMILY MEDICINE

## 2020-12-05 PROCEDURE — 12014 RPR F/E/E/N/L/M 5.1-7.5 CM: CPT | Performed by: EMERGENCY MEDICINE

## 2020-12-05 PROCEDURE — 84300 ASSAY OF URINE SODIUM: CPT | Performed by: STUDENT IN AN ORGANIZED HEALTH CARE EDUCATION/TRAINING PROGRAM

## 2020-12-05 PROCEDURE — 99285 EMERGENCY DEPT VISIT HI MDM: CPT | Performed by: EMERGENCY MEDICINE

## 2020-12-05 PROCEDURE — 0HQ1XZZ REPAIR FACE SKIN, EXTERNAL APPROACH: ICD-10-PCS | Performed by: EMERGENCY MEDICINE

## 2020-12-05 PROCEDURE — 96365 THER/PROPH/DIAG IV INF INIT: CPT

## 2020-12-05 PROCEDURE — 70450 CT HEAD/BRAIN W/O DYE: CPT

## 2020-12-05 PROCEDURE — 74177 CT ABD & PELVIS W/CONTRAST: CPT

## 2020-12-05 PROCEDURE — 80053 COMPREHEN METABOLIC PANEL: CPT | Performed by: EMERGENCY MEDICINE

## 2020-12-05 PROCEDURE — 82948 REAGENT STRIP/BLOOD GLUCOSE: CPT

## 2020-12-05 PROCEDURE — 84443 ASSAY THYROID STIM HORMONE: CPT | Performed by: STUDENT IN AN ORGANIZED HEALTH CARE EDUCATION/TRAINING PROGRAM

## 2020-12-05 PROCEDURE — 90471 IMMUNIZATION ADMIN: CPT

## 2020-12-05 PROCEDURE — 82553 CREATINE MB FRACTION: CPT | Performed by: EMERGENCY MEDICINE

## 2020-12-05 PROCEDURE — 99285 EMERGENCY DEPT VISIT HI MDM: CPT

## 2020-12-05 PROCEDURE — 36415 COLL VENOUS BLD VENIPUNCTURE: CPT | Performed by: EMERGENCY MEDICINE

## 2020-12-05 PROCEDURE — 82043 UR ALBUMIN QUANTITATIVE: CPT | Performed by: STUDENT IN AN ORGANIZED HEALTH CARE EDUCATION/TRAINING PROGRAM

## 2020-12-05 PROCEDURE — 81001 URINALYSIS AUTO W/SCOPE: CPT | Performed by: STUDENT IN AN ORGANIZED HEALTH CARE EDUCATION/TRAINING PROGRAM

## 2020-12-05 PROCEDURE — 83735 ASSAY OF MAGNESIUM: CPT | Performed by: STUDENT IN AN ORGANIZED HEALTH CARE EDUCATION/TRAINING PROGRAM

## 2020-12-05 PROCEDURE — 85025 COMPLETE CBC W/AUTO DIFF WBC: CPT | Performed by: EMERGENCY MEDICINE

## 2020-12-05 PROCEDURE — 80320 DRUG SCREEN QUANTALCOHOLS: CPT | Performed by: EMERGENCY MEDICINE

## 2020-12-05 PROCEDURE — 84484 ASSAY OF TROPONIN QUANT: CPT | Performed by: EMERGENCY MEDICINE

## 2020-12-05 PROCEDURE — 93306 TTE W/DOPPLER COMPLETE: CPT

## 2020-12-05 PROCEDURE — 71260 CT THORAX DX C+: CPT

## 2020-12-05 PROCEDURE — 73110 X-RAY EXAM OF WRIST: CPT

## 2020-12-05 PROCEDURE — 85049 AUTOMATED PLATELET COUNT: CPT | Performed by: STUDENT IN AN ORGANIZED HEALTH CARE EDUCATION/TRAINING PROGRAM

## 2020-12-05 PROCEDURE — 93005 ELECTROCARDIOGRAM TRACING: CPT

## 2020-12-05 RX ORDER — CALCIUM GLUCONATE 20 MG/ML
1 INJECTION, SOLUTION INTRAVENOUS ONCE
Status: COMPLETED | OUTPATIENT
Start: 2020-12-05 | End: 2020-12-05

## 2020-12-05 RX ORDER — GINSENG 100 MG
1 CAPSULE ORAL ONCE
Status: COMPLETED | OUTPATIENT
Start: 2020-12-05 | End: 2020-12-05

## 2020-12-05 RX ORDER — THIAMINE MONONITRATE (VIT B1) 100 MG
100 TABLET ORAL DAILY
Status: DISCONTINUED | OUTPATIENT
Start: 2020-12-05 | End: 2020-12-07 | Stop reason: HOSPADM

## 2020-12-05 RX ORDER — NICOTINE 21 MG/24HR
1 PATCH, TRANSDERMAL 24 HOURS TRANSDERMAL DAILY
Status: DISCONTINUED | OUTPATIENT
Start: 2020-12-05 | End: 2020-12-07 | Stop reason: HOSPADM

## 2020-12-05 RX ORDER — ACETAMINOPHEN 325 MG/1
650 TABLET ORAL EVERY 6 HOURS PRN
Status: DISCONTINUED | OUTPATIENT
Start: 2020-12-05 | End: 2020-12-07 | Stop reason: HOSPADM

## 2020-12-05 RX ORDER — SODIUM CHLORIDE 9 MG/ML
100 INJECTION, SOLUTION INTRAVENOUS CONTINUOUS
Status: DISCONTINUED | OUTPATIENT
Start: 2020-12-05 | End: 2020-12-05

## 2020-12-05 RX ORDER — METOPROLOL SUCCINATE 100 MG/1
100 TABLET, EXTENDED RELEASE ORAL DAILY
Status: DISCONTINUED | OUTPATIENT
Start: 2020-12-05 | End: 2020-12-07 | Stop reason: HOSPADM

## 2020-12-05 RX ORDER — POTASSIUM CHLORIDE 14.9 MG/ML
20 INJECTION INTRAVENOUS ONCE
Status: COMPLETED | OUTPATIENT
Start: 2020-12-05 | End: 2020-12-05

## 2020-12-05 RX ORDER — LIDOCAINE HYDROCHLORIDE AND EPINEPHRINE 10; 10 MG/ML; UG/ML
20 INJECTION, SOLUTION INFILTRATION; PERINEURAL ONCE
Status: COMPLETED | OUTPATIENT
Start: 2020-12-05 | End: 2020-12-05

## 2020-12-05 RX ORDER — CLOPIDOGREL BISULFATE 75 MG/1
75 TABLET ORAL DAILY
Status: DISCONTINUED | OUTPATIENT
Start: 2020-12-05 | End: 2020-12-07 | Stop reason: HOSPADM

## 2020-12-05 RX ORDER — MAGNESIUM SULFATE HEPTAHYDRATE 40 MG/ML
2 INJECTION, SOLUTION INTRAVENOUS ONCE
Status: COMPLETED | OUTPATIENT
Start: 2020-12-05 | End: 2020-12-05

## 2020-12-05 RX ORDER — HEPARIN SODIUM 5000 [USP'U]/ML
5000 INJECTION, SOLUTION INTRAVENOUS; SUBCUTANEOUS EVERY 8 HOURS SCHEDULED
Status: DISCONTINUED | OUTPATIENT
Start: 2020-12-05 | End: 2020-12-05

## 2020-12-05 RX ORDER — TAMSULOSIN HYDROCHLORIDE 0.4 MG/1
0.4 CAPSULE ORAL DAILY
Status: DISCONTINUED | OUTPATIENT
Start: 2020-12-05 | End: 2020-12-07 | Stop reason: HOSPADM

## 2020-12-05 RX ORDER — FOLIC ACID 1 MG/1
1 TABLET ORAL DAILY
Status: DISCONTINUED | OUTPATIENT
Start: 2020-12-05 | End: 2020-12-07 | Stop reason: HOSPADM

## 2020-12-05 RX ORDER — LORAZEPAM 2 MG/ML
2 INJECTION INTRAMUSCULAR 2 TIMES DAILY PRN
Status: DISCONTINUED | OUTPATIENT
Start: 2020-12-05 | End: 2020-12-07 | Stop reason: HOSPADM

## 2020-12-05 RX ORDER — POTASSIUM CHLORIDE AND SODIUM CHLORIDE 900; 300 MG/100ML; MG/100ML
125 INJECTION, SOLUTION INTRAVENOUS CONTINUOUS
Status: DISCONTINUED | OUTPATIENT
Start: 2020-12-05 | End: 2020-12-05 | Stop reason: RX

## 2020-12-05 RX ORDER — AMLODIPINE BESYLATE 5 MG/1
5 TABLET ORAL DAILY
Status: DISCONTINUED | OUTPATIENT
Start: 2020-12-05 | End: 2020-12-07

## 2020-12-05 RX ORDER — PRAVASTATIN SODIUM 40 MG
40 TABLET ORAL
Status: DISCONTINUED | OUTPATIENT
Start: 2020-12-05 | End: 2020-12-07 | Stop reason: HOSPADM

## 2020-12-05 RX ORDER — LISINOPRIL 5 MG/1
10 TABLET ORAL DAILY
Status: CANCELLED | OUTPATIENT
Start: 2020-12-05

## 2020-12-05 RX ADMIN — Medication 1 TABLET: at 09:41

## 2020-12-05 RX ADMIN — APIXABAN 2.5 MG: 2.5 TABLET, FILM COATED ORAL at 18:27

## 2020-12-05 RX ADMIN — POTASSIUM CHLORIDE 20 MEQ: 14.9 INJECTION, SOLUTION INTRAVENOUS at 07:16

## 2020-12-05 RX ADMIN — AMLODIPINE BESYLATE 5 MG: 5 TABLET ORAL at 09:42

## 2020-12-05 RX ADMIN — CALCIUM GLUCONATE 1 G: 20 INJECTION, SOLUTION INTRAVENOUS at 07:02

## 2020-12-05 RX ADMIN — Medication 100 MG: at 09:42

## 2020-12-05 RX ADMIN — SODIUM CHLORIDE 1000 ML: 0.9 INJECTION, SOLUTION INTRAVENOUS at 04:31

## 2020-12-05 RX ADMIN — APIXABAN 2.5 MG: 2.5 TABLET, FILM COATED ORAL at 09:41

## 2020-12-05 RX ADMIN — SODIUM CHLORIDE 1000 ML: 0.9 INJECTION, SOLUTION INTRAVENOUS at 05:33

## 2020-12-05 RX ADMIN — TETANUS TOXOID, REDUCED DIPHTHERIA TOXOID AND ACELLULAR PERTUSSIS VACCINE, ADSORBED 0.5 ML: 5; 2.5; 8; 8; 2.5 SUSPENSION INTRAMUSCULAR at 05:41

## 2020-12-05 RX ADMIN — TAMSULOSIN HYDROCHLORIDE 0.4 MG: 0.4 CAPSULE ORAL at 09:41

## 2020-12-05 RX ADMIN — LIDOCAINE HYDROCHLORIDE,EPINEPHRINE BITARTRATE 20 ML: 10; .01 INJECTION, SOLUTION INFILTRATION; PERINEURAL at 05:41

## 2020-12-05 RX ADMIN — FOLIC ACID 1 MG: 1 TABLET ORAL at 09:41

## 2020-12-05 RX ADMIN — BACITRACIN 1 SMALL APPLICATION: 500 OINTMENT TOPICAL at 05:41

## 2020-12-05 RX ADMIN — POTASSIUM CHLORIDE: 2 INJECTION, SOLUTION, CONCENTRATE INTRAVENOUS at 13:43

## 2020-12-05 RX ADMIN — POTASSIUM CHLORIDE 20 MEQ: 14.9 INJECTION, SOLUTION INTRAVENOUS at 09:42

## 2020-12-05 RX ADMIN — ACETAMINOPHEN 650 MG: 325 TABLET ORAL at 19:17

## 2020-12-05 RX ADMIN — POTASSIUM CHLORIDE 20 MEQ: 14.9 INJECTION, SOLUTION INTRAVENOUS at 05:26

## 2020-12-05 RX ADMIN — METOPROLOL SUCCINATE 100 MG: 100 TABLET, EXTENDED RELEASE ORAL at 09:41

## 2020-12-05 RX ADMIN — PRAVASTATIN SODIUM 40 MG: 40 TABLET ORAL at 18:27

## 2020-12-05 RX ADMIN — IOHEXOL 100 ML: 350 INJECTION, SOLUTION INTRAVENOUS at 05:12

## 2020-12-05 RX ADMIN — MAGNESIUM SULFATE HEPTAHYDRATE 2 G: 40 INJECTION, SOLUTION INTRAVENOUS at 05:26

## 2020-12-05 RX ADMIN — CLOPIDOGREL BISULFATE 75 MG: 75 TABLET ORAL at 09:42

## 2020-12-06 PROBLEM — M79.602 PAIN IN BOTH UPPER EXTREMITIES: Status: ACTIVE | Noted: 2020-12-06

## 2020-12-06 PROBLEM — M79.601 PAIN IN BOTH UPPER EXTREMITIES: Status: ACTIVE | Noted: 2020-12-06

## 2020-12-06 LAB
ALBUMIN SERPL BCP-MCNC: 3.3 G/DL (ref 3.5–5)
ALP SERPL-CCNC: 71 U/L (ref 46–116)
ALT SERPL W P-5'-P-CCNC: 37 U/L (ref 12–78)
ANION GAP SERPL CALCULATED.3IONS-SCNC: 13 MMOL/L (ref 4–13)
AST SERPL W P-5'-P-CCNC: 28 U/L (ref 5–45)
BASOPHILS # BLD AUTO: 0.04 THOUSANDS/ΜL (ref 0–0.1)
BASOPHILS NFR BLD AUTO: 1 % (ref 0–1)
BILIRUB SERPL-MCNC: 0.7 MG/DL (ref 0.2–1)
BUN SERPL-MCNC: 33 MG/DL (ref 5–25)
CALCIUM ALBUM COR SERPL-MCNC: 9.5 MG/DL (ref 8.3–10.1)
CALCIUM SERPL-MCNC: 8.9 MG/DL (ref 8.3–10.1)
CHLORIDE SERPL-SCNC: 102 MMOL/L (ref 100–108)
CO2 SERPL-SCNC: 22 MMOL/L (ref 21–32)
CREAT SERPL-MCNC: 2.24 MG/DL (ref 0.6–1.3)
CREAT UR-MCNC: 50 MG/DL
CREAT UR-MCNC: 50 MG/DL
EOSINOPHIL # BLD AUTO: 0.06 THOUSAND/ΜL (ref 0–0.61)
EOSINOPHIL NFR BLD AUTO: 1 % (ref 0–6)
ERYTHROCYTE [DISTWIDTH] IN BLOOD BY AUTOMATED COUNT: 13.1 % (ref 11.6–15.1)
GFR SERPL CREATININE-BSD FRML MDRD: 31 ML/MIN/1.73SQ M
GLUCOSE SERPL-MCNC: 105 MG/DL (ref 65–140)
GLUCOSE SERPL-MCNC: 111 MG/DL (ref 65–140)
GLUCOSE SERPL-MCNC: 113 MG/DL (ref 65–140)
GLUCOSE SERPL-MCNC: 122 MG/DL (ref 65–140)
GLUCOSE SERPL-MCNC: 97 MG/DL (ref 65–140)
HCT VFR BLD AUTO: 38.2 % (ref 36.5–49.3)
HGB BLD-MCNC: 12.6 G/DL (ref 12–17)
IMM GRANULOCYTES # BLD AUTO: 0.01 THOUSAND/UL (ref 0–0.2)
IMM GRANULOCYTES NFR BLD AUTO: 0 % (ref 0–2)
INR PPP: 1.02 (ref 0.84–1.19)
LYMPHOCYTES # BLD AUTO: 1 THOUSANDS/ΜL (ref 0.6–4.47)
LYMPHOCYTES NFR BLD AUTO: 19 % (ref 14–44)
MAGNESIUM SERPL-MCNC: 1.6 MG/DL (ref 1.6–2.6)
MCH RBC QN AUTO: 33.1 PG (ref 26.8–34.3)
MCHC RBC AUTO-ENTMCNC: 33 G/DL (ref 31.4–37.4)
MCV RBC AUTO: 100 FL (ref 82–98)
MICROALBUMIN UR-MCNC: 56.4 MG/L (ref 0–20)
MICROALBUMIN/CREAT 24H UR: 113 MG/G CREATININE (ref 0–30)
MONOCYTES # BLD AUTO: 1.13 THOUSAND/ΜL (ref 0.17–1.22)
MONOCYTES NFR BLD AUTO: 21 % (ref 4–12)
NEUTROPHILS # BLD AUTO: 3.09 THOUSANDS/ΜL (ref 1.85–7.62)
NEUTS SEG NFR BLD AUTO: 58 % (ref 43–75)
NRBC BLD AUTO-RTO: 0 /100 WBCS
PHOSPHATE SERPL-MCNC: 2.9 MG/DL (ref 2.7–4.5)
PLATELET # BLD AUTO: 97 THOUSANDS/UL (ref 149–390)
PMV BLD AUTO: 11.8 FL (ref 8.9–12.7)
POTASSIUM SERPL-SCNC: 3.8 MMOL/L (ref 3.5–5.3)
PROT SERPL-MCNC: 7.5 G/DL (ref 6.4–8.2)
PROTHROMBIN TIME: 13.3 SECONDS (ref 11.6–14.5)
RBC # BLD AUTO: 3.81 MILLION/UL (ref 3.88–5.62)
SODIUM 24H UR-SCNC: 46 MOL/L
SODIUM SERPL-SCNC: 137 MMOL/L (ref 136–145)
WBC # BLD AUTO: 5.33 THOUSAND/UL (ref 4.31–10.16)

## 2020-12-06 PROCEDURE — 80053 COMPREHEN METABOLIC PANEL: CPT | Performed by: STUDENT IN AN ORGANIZED HEALTH CARE EDUCATION/TRAINING PROGRAM

## 2020-12-06 PROCEDURE — 83735 ASSAY OF MAGNESIUM: CPT | Performed by: STUDENT IN AN ORGANIZED HEALTH CARE EDUCATION/TRAINING PROGRAM

## 2020-12-06 PROCEDURE — 85025 COMPLETE CBC W/AUTO DIFF WBC: CPT | Performed by: STUDENT IN AN ORGANIZED HEALTH CARE EDUCATION/TRAINING PROGRAM

## 2020-12-06 PROCEDURE — 85610 PROTHROMBIN TIME: CPT | Performed by: STUDENT IN AN ORGANIZED HEALTH CARE EDUCATION/TRAINING PROGRAM

## 2020-12-06 PROCEDURE — 82948 REAGENT STRIP/BLOOD GLUCOSE: CPT

## 2020-12-06 PROCEDURE — 84100 ASSAY OF PHOSPHORUS: CPT | Performed by: STUDENT IN AN ORGANIZED HEALTH CARE EDUCATION/TRAINING PROGRAM

## 2020-12-06 PROCEDURE — 99232 SBSQ HOSP IP/OBS MODERATE 35: CPT | Performed by: FAMILY MEDICINE

## 2020-12-06 PROCEDURE — 99232 SBSQ HOSP IP/OBS MODERATE 35: CPT | Performed by: INTERNAL MEDICINE

## 2020-12-06 RX ORDER — MAGNESIUM SULFATE HEPTAHYDRATE 40 MG/ML
2 INJECTION, SOLUTION INTRAVENOUS ONCE
Status: COMPLETED | OUTPATIENT
Start: 2020-12-06 | End: 2020-12-06

## 2020-12-06 RX ORDER — SODIUM CHLORIDE 9 MG/ML
100 INJECTION, SOLUTION INTRAVENOUS CONTINUOUS
Status: DISCONTINUED | OUTPATIENT
Start: 2020-12-06 | End: 2020-12-07 | Stop reason: HOSPADM

## 2020-12-06 RX ORDER — POTASSIUM CHLORIDE 20 MEQ/1
20 TABLET, EXTENDED RELEASE ORAL ONCE
Status: COMPLETED | OUTPATIENT
Start: 2020-12-06 | End: 2020-12-06

## 2020-12-06 RX ADMIN — SODIUM CHLORIDE 100 ML/HR: 0.9 INJECTION, SOLUTION INTRAVENOUS at 06:31

## 2020-12-06 RX ADMIN — Medication 1 TABLET: at 10:01

## 2020-12-06 RX ADMIN — SODIUM CHLORIDE 100 ML/HR: 0.9 INJECTION, SOLUTION INTRAVENOUS at 22:13

## 2020-12-06 RX ADMIN — FOLIC ACID 1 MG: 1 TABLET ORAL at 10:02

## 2020-12-06 RX ADMIN — METOPROLOL SUCCINATE 100 MG: 100 TABLET, EXTENDED RELEASE ORAL at 10:00

## 2020-12-06 RX ADMIN — Medication 100 MG: at 10:12

## 2020-12-06 RX ADMIN — APIXABAN 2.5 MG: 2.5 TABLET, FILM COATED ORAL at 10:02

## 2020-12-06 RX ADMIN — POTASSIUM CHLORIDE 20 MEQ: 1500 TABLET, EXTENDED RELEASE ORAL at 10:03

## 2020-12-06 RX ADMIN — MAGNESIUM SULFATE HEPTAHYDRATE 2 G: 40 INJECTION, SOLUTION INTRAVENOUS at 10:02

## 2020-12-06 RX ADMIN — AMLODIPINE BESYLATE 5 MG: 5 TABLET ORAL at 10:02

## 2020-12-06 RX ADMIN — APIXABAN 2.5 MG: 2.5 TABLET, FILM COATED ORAL at 17:28

## 2020-12-06 RX ADMIN — ACETAMINOPHEN 650 MG: 325 TABLET ORAL at 10:12

## 2020-12-06 RX ADMIN — ACETAMINOPHEN 650 MG: 325 TABLET ORAL at 22:12

## 2020-12-06 RX ADMIN — CLOPIDOGREL BISULFATE 75 MG: 75 TABLET ORAL at 10:02

## 2020-12-06 RX ADMIN — PRAVASTATIN SODIUM 40 MG: 40 TABLET ORAL at 17:28

## 2020-12-06 RX ADMIN — POTASSIUM CHLORIDE: 2 INJECTION, SOLUTION, CONCENTRATE INTRAVENOUS at 03:17

## 2020-12-06 RX ADMIN — TAMSULOSIN HYDROCHLORIDE 0.4 MG: 0.4 CAPSULE ORAL at 10:00

## 2020-12-07 ENCOUNTER — TELEPHONE (OUTPATIENT)
Dept: OTHER | Facility: HOSPITAL | Age: 60
End: 2020-12-07

## 2020-12-07 ENCOUNTER — HOSPITAL ENCOUNTER (INPATIENT)
Facility: HOSPITAL | Age: 60
LOS: 14 days | Discharge: HOME | DRG: 029 | End: 2020-12-21
Attending: SURGERY | Admitting: SURGERY
Payer: MEDICARE

## 2020-12-07 ENCOUNTER — APPOINTMENT (INPATIENT)
Dept: RADIOLOGY | Facility: HOSPITAL | Age: 60
DRG: 052 | End: 2020-12-07
Payer: MEDICARE

## 2020-12-07 VITALS
HEART RATE: 88 BPM | BODY MASS INDEX: 20.37 KG/M2 | TEMPERATURE: 97.5 F | SYSTOLIC BLOOD PRESSURE: 168 MMHG | HEIGHT: 74 IN | RESPIRATION RATE: 18 BRPM | OXYGEN SATURATION: 96 % | WEIGHT: 158.73 LBS | DIASTOLIC BLOOD PRESSURE: 106 MMHG

## 2020-12-07 DIAGNOSIS — S14.129S CENTRAL CORD SYNDROME, SEQUELA (HCC): ICD-10-CM

## 2020-12-07 DIAGNOSIS — G95.19 EDEMA, SPINAL CORD (HCC): Primary | ICD-10-CM

## 2020-12-07 DIAGNOSIS — I48.91 ATRIAL FIBRILLATION, UNSPECIFIED TYPE (HCC): ICD-10-CM

## 2020-12-07 DIAGNOSIS — I10 ESSENTIAL (PRIMARY) HYPERTENSION: Chronic | ICD-10-CM

## 2020-12-07 LAB
ALBUMIN SERPL BCP-MCNC: 3.1 G/DL (ref 3.5–5)
ALP SERPL-CCNC: 62 U/L (ref 46–116)
ALT SERPL W P-5'-P-CCNC: 32 U/L (ref 12–78)
ANION GAP SERPL CALCULATED.3IONS-SCNC: 11 MMOL/L (ref 4–13)
ANION GAP SERPL CALCULATED.3IONS-SCNC: 7 MMOL/L (ref 4–13)
AST SERPL W P-5'-P-CCNC: 25 U/L (ref 5–45)
BASOPHILS # BLD AUTO: 0.05 THOUSANDS/ΜL (ref 0–0.1)
BASOPHILS # BLD AUTO: 0.07 THOUSANDS/ΜL (ref 0–0.1)
BASOPHILS NFR BLD AUTO: 1 % (ref 0–1)
BASOPHILS NFR BLD AUTO: 1 % (ref 0–1)
BILIRUB SERPL-MCNC: 0.6 MG/DL (ref 0.2–1)
BUN SERPL-MCNC: 31 MG/DL (ref 5–25)
BUN SERPL-MCNC: 34 MG/DL (ref 5–25)
CA-I BLD-SCNC: 1.09 MMOL/L (ref 1.12–1.32)
CALCIUM ALBUM COR SERPL-MCNC: 9.3 MG/DL (ref 8.3–10.1)
CALCIUM SERPL-MCNC: 8.6 MG/DL (ref 8.3–10.1)
CALCIUM SERPL-MCNC: 8.9 MG/DL (ref 8.3–10.1)
CHLORIDE SERPL-SCNC: 103 MMOL/L (ref 100–108)
CHLORIDE SERPL-SCNC: 109 MMOL/L (ref 100–108)
CK SERPL-CCNC: 89 U/L (ref 39–308)
CO2 SERPL-SCNC: 23 MMOL/L (ref 21–32)
CO2 SERPL-SCNC: 23 MMOL/L (ref 21–32)
CREAT SERPL-MCNC: 1.73 MG/DL (ref 0.6–1.3)
CREAT SERPL-MCNC: 1.85 MG/DL (ref 0.6–1.3)
EOSINOPHIL # BLD AUTO: 0.11 THOUSAND/ΜL (ref 0–0.61)
EOSINOPHIL # BLD AUTO: 0.18 THOUSAND/ΜL (ref 0–0.61)
EOSINOPHIL NFR BLD AUTO: 2 % (ref 0–6)
EOSINOPHIL NFR BLD AUTO: 3 % (ref 0–6)
ERYTHROCYTE [DISTWIDTH] IN BLOOD BY AUTOMATED COUNT: 12.9 % (ref 11.6–15.1)
ERYTHROCYTE [DISTWIDTH] IN BLOOD BY AUTOMATED COUNT: 13.3 % (ref 11.6–15.1)
EST. AVERAGE GLUCOSE BLD GHB EST-MCNC: 105 MG/DL
GFR SERPL CREATININE-BSD FRML MDRD: 39 ML/MIN/1.73SQ M
GFR SERPL CREATININE-BSD FRML MDRD: 43 ML/MIN/1.73SQ M
GLUCOSE SERPL-MCNC: 107 MG/DL (ref 65–140)
GLUCOSE SERPL-MCNC: 108 MG/DL (ref 65–140)
GLUCOSE SERPL-MCNC: 112 MG/DL (ref 65–140)
GLUCOSE SERPL-MCNC: 116 MG/DL (ref 65–140)
GLUCOSE SERPL-MCNC: 132 MG/DL (ref 65–140)
HBA1C MFR BLD: 5.3 %
HCT VFR BLD AUTO: 37.5 % (ref 36.5–49.3)
HCT VFR BLD AUTO: 39.6 % (ref 36.5–49.3)
HGB BLD-MCNC: 12.7 G/DL (ref 12–17)
HGB BLD-MCNC: 12.7 G/DL (ref 12–17)
IMM GRANULOCYTES # BLD AUTO: 0.02 THOUSAND/UL (ref 0–0.2)
IMM GRANULOCYTES # BLD AUTO: 0.02 THOUSAND/UL (ref 0–0.2)
IMM GRANULOCYTES NFR BLD AUTO: 0 % (ref 0–2)
IMM GRANULOCYTES NFR BLD AUTO: 0 % (ref 0–2)
INR PPP: 0.97 (ref 0.84–1.19)
LYMPHOCYTES # BLD AUTO: 1.13 THOUSANDS/ΜL (ref 0.6–4.47)
LYMPHOCYTES # BLD AUTO: 1.41 THOUSANDS/ΜL (ref 0.6–4.47)
LYMPHOCYTES NFR BLD AUTO: 23 % (ref 14–44)
LYMPHOCYTES NFR BLD AUTO: 25 % (ref 14–44)
MAGNESIUM SERPL-MCNC: 1.6 MG/DL (ref 1.6–2.6)
MAGNESIUM SERPL-MCNC: 2 MG/DL (ref 1.6–2.6)
MCH RBC QN AUTO: 32.5 PG (ref 26.8–34.3)
MCH RBC QN AUTO: 34 PG (ref 26.8–34.3)
MCHC RBC AUTO-ENTMCNC: 32.1 G/DL (ref 31.4–37.4)
MCHC RBC AUTO-ENTMCNC: 33.9 G/DL (ref 31.4–37.4)
MCV RBC AUTO: 100 FL (ref 82–98)
MCV RBC AUTO: 101 FL (ref 82–98)
MONOCYTES # BLD AUTO: 1.15 THOUSAND/ΜL (ref 0.17–1.22)
MONOCYTES # BLD AUTO: 1.41 THOUSAND/ΜL (ref 0.17–1.22)
MONOCYTES NFR BLD AUTO: 24 % (ref 4–12)
MONOCYTES NFR BLD AUTO: 25 % (ref 4–12)
NEUTROPHILS # BLD AUTO: 2.44 THOUSANDS/ΜL (ref 1.85–7.62)
NEUTROPHILS # BLD AUTO: 2.59 THOUSANDS/ΜL (ref 1.85–7.62)
NEUTS SEG NFR BLD AUTO: 46 % (ref 43–75)
NEUTS SEG NFR BLD AUTO: 50 % (ref 43–75)
NRBC BLD AUTO-RTO: 0 /100 WBCS
NRBC BLD AUTO-RTO: 0 /100 WBCS
PHOSPHATE SERPL-MCNC: 3.1 MG/DL (ref 2.7–4.5)
PHOSPHATE SERPL-MCNC: 3.3 MG/DL (ref 2.7–4.5)
PLATELET # BLD AUTO: 122 THOUSANDS/UL (ref 149–390)
PLATELET # BLD AUTO: 147 THOUSANDS/UL (ref 149–390)
PMV BLD AUTO: 10.9 FL (ref 8.9–12.7)
PMV BLD AUTO: 11.2 FL (ref 8.9–12.7)
POTASSIUM SERPL-SCNC: 3.6 MMOL/L (ref 3.5–5.3)
POTASSIUM SERPL-SCNC: 4.2 MMOL/L (ref 3.5–5.3)
PROT SERPL-MCNC: 7 G/DL (ref 6.4–8.2)
PROTHROMBIN TIME: 12.8 SECONDS (ref 11.6–14.5)
RBC # BLD AUTO: 3.74 MILLION/UL (ref 3.88–5.62)
RBC # BLD AUTO: 3.91 MILLION/UL (ref 3.88–5.62)
SODIUM SERPL-SCNC: 137 MMOL/L (ref 136–145)
SODIUM SERPL-SCNC: 139 MMOL/L (ref 136–145)
WBC # BLD AUTO: 4.9 THOUSAND/UL (ref 4.31–10.16)
WBC # BLD AUTO: 5.68 THOUSAND/UL (ref 4.31–10.16)

## 2020-12-07 PROCEDURE — NC001 PR NO CHARGE: Performed by: NEUROLOGICAL SURGERY

## 2020-12-07 PROCEDURE — 99232 SBSQ HOSP IP/OBS MODERATE 35: CPT | Performed by: INTERNAL MEDICINE

## 2020-12-07 PROCEDURE — C9132 KCENTRA, PER I.U.: HCPCS | Performed by: GENERAL PRACTICE

## 2020-12-07 PROCEDURE — 76770 US EXAM ABDO BACK WALL COMP: CPT

## 2020-12-07 PROCEDURE — 99223 1ST HOSP IP/OBS HIGH 75: CPT | Performed by: INTERNAL MEDICINE

## 2020-12-07 PROCEDURE — 82948 REAGENT STRIP/BLOOD GLUCOSE: CPT

## 2020-12-07 PROCEDURE — 99238 HOSP IP/OBS DSCHRG MGMT 30/<: CPT | Performed by: FAMILY MEDICINE

## 2020-12-07 PROCEDURE — 82330 ASSAY OF CALCIUM: CPT | Performed by: STUDENT IN AN ORGANIZED HEALTH CARE EDUCATION/TRAINING PROGRAM

## 2020-12-07 PROCEDURE — NC001 PR NO CHARGE: Performed by: FAMILY MEDICINE

## 2020-12-07 PROCEDURE — 85025 COMPLETE CBC W/AUTO DIFF WBC: CPT | Performed by: STUDENT IN AN ORGANIZED HEALTH CARE EDUCATION/TRAINING PROGRAM

## 2020-12-07 PROCEDURE — 99223 1ST HOSP IP/OBS HIGH 75: CPT | Performed by: SURGERY

## 2020-12-07 PROCEDURE — 83735 ASSAY OF MAGNESIUM: CPT | Performed by: STUDENT IN AN ORGANIZED HEALTH CARE EDUCATION/TRAINING PROGRAM

## 2020-12-07 PROCEDURE — G1004 CDSM NDSC: HCPCS

## 2020-12-07 PROCEDURE — 80053 COMPREHEN METABOLIC PANEL: CPT | Performed by: STUDENT IN AN ORGANIZED HEALTH CARE EDUCATION/TRAINING PROGRAM

## 2020-12-07 PROCEDURE — 84100 ASSAY OF PHOSPHORUS: CPT | Performed by: STUDENT IN AN ORGANIZED HEALTH CARE EDUCATION/TRAINING PROGRAM

## 2020-12-07 PROCEDURE — 85610 PROTHROMBIN TIME: CPT | Performed by: GENERAL PRACTICE

## 2020-12-07 PROCEDURE — 83036 HEMOGLOBIN GLYCOSYLATED A1C: CPT | Performed by: GENERAL PRACTICE

## 2020-12-07 PROCEDURE — 93005 ELECTROCARDIOGRAM TRACING: CPT

## 2020-12-07 PROCEDURE — 99223 1ST HOSP IP/OBS HIGH 75: CPT | Performed by: NEUROLOGICAL SURGERY

## 2020-12-07 PROCEDURE — 80048 BASIC METABOLIC PNL TOTAL CA: CPT | Performed by: STUDENT IN AN ORGANIZED HEALTH CARE EDUCATION/TRAINING PROGRAM

## 2020-12-07 PROCEDURE — 82550 ASSAY OF CK (CPK): CPT | Performed by: STUDENT IN AN ORGANIZED HEALTH CARE EDUCATION/TRAINING PROGRAM

## 2020-12-07 PROCEDURE — 99223 1ST HOSP IP/OBS HIGH 75: CPT | Performed by: NURSE PRACTITIONER

## 2020-12-07 PROCEDURE — 93306 TTE W/DOPPLER COMPLETE: CPT | Performed by: INTERNAL MEDICINE

## 2020-12-07 PROCEDURE — 72141 MRI NECK SPINE W/O DYE: CPT

## 2020-12-07 RX ORDER — POTASSIUM CHLORIDE 14.9 MG/ML
20 INJECTION INTRAVENOUS ONCE
Status: COMPLETED | OUTPATIENT
Start: 2020-12-07 | End: 2020-12-07

## 2020-12-07 RX ORDER — THIAMINE MONONITRATE (VIT B1) 100 MG
100 TABLET ORAL DAILY
Status: DISCONTINUED | OUTPATIENT
Start: 2020-12-08 | End: 2020-12-21 | Stop reason: HOSPADM

## 2020-12-07 RX ORDER — HEPARIN SODIUM 5000 [USP'U]/ML
5000 INJECTION, SOLUTION INTRAVENOUS; SUBCUTANEOUS EVERY 8 HOURS SCHEDULED
Status: DISCONTINUED | OUTPATIENT
Start: 2020-12-07 | End: 2020-12-07

## 2020-12-07 RX ORDER — AMLODIPINE BESYLATE 10 MG/1
10 TABLET ORAL DAILY
Status: DISCONTINUED | OUTPATIENT
Start: 2020-12-08 | End: 2020-12-07 | Stop reason: HOSPADM

## 2020-12-07 RX ORDER — FOLIC ACID 1 MG/1
1 TABLET ORAL DAILY
Status: DISCONTINUED | OUTPATIENT
Start: 2020-12-08 | End: 2020-12-21 | Stop reason: HOSPADM

## 2020-12-07 RX ORDER — OXYCODONE HYDROCHLORIDE 5 MG/1
5 TABLET ORAL EVERY 4 HOURS PRN
Status: DISCONTINUED | OUTPATIENT
Start: 2020-12-07 | End: 2020-12-21 | Stop reason: HOSPADM

## 2020-12-07 RX ORDER — POLYETHYLENE GLYCOL 3350 17 G/17G
17 POWDER, FOR SOLUTION ORAL DAILY PRN
Status: DISCONTINUED | OUTPATIENT
Start: 2020-12-07 | End: 2020-12-21 | Stop reason: HOSPADM

## 2020-12-07 RX ORDER — FOLIC ACID 1 MG/1
1 TABLET ORAL DAILY
Qty: 30 TABLET | Refills: 3 | Status: SHIPPED | OUTPATIENT
Start: 2020-12-08 | End: 2021-05-25

## 2020-12-07 RX ORDER — ACETAMINOPHEN 325 MG/1
650 TABLET ORAL EVERY 6 HOURS PRN
Status: DISCONTINUED | OUTPATIENT
Start: 2020-12-07 | End: 2020-12-16

## 2020-12-07 RX ORDER — HYDROMORPHONE HCL/PF 1 MG/ML
0.5 SYRINGE (ML) INJECTION
Status: DISCONTINUED | OUTPATIENT
Start: 2020-12-07 | End: 2020-12-21 | Stop reason: HOSPADM

## 2020-12-07 RX ORDER — POTASSIUM CHLORIDE 29.8 MG/ML
40 INJECTION INTRAVENOUS ONCE
Status: DISCONTINUED | OUTPATIENT
Start: 2020-12-07 | End: 2020-12-07 | Stop reason: RX

## 2020-12-07 RX ORDER — LANOLIN ALCOHOL/MO/W.PET/CERES
100 CREAM (GRAM) TOPICAL DAILY
Qty: 30 TABLET | Refills: 3 | Status: SHIPPED | OUTPATIENT
Start: 2020-12-08 | End: 2021-05-25

## 2020-12-07 RX ORDER — METOPROLOL TARTRATE 5 MG/5ML
5 INJECTION INTRAVENOUS EVERY 6 HOURS
Status: DISCONTINUED | OUTPATIENT
Start: 2020-12-07 | End: 2020-12-08

## 2020-12-07 RX ORDER — GUAIFENESIN/DEXTROMETHORPHAN 100-10MG/5
10 SYRUP ORAL EVERY 4 HOURS PRN
Status: DISCONTINUED | OUTPATIENT
Start: 2020-12-07 | End: 2020-12-07 | Stop reason: HOSPADM

## 2020-12-07 RX ORDER — OXYCODONE HYDROCHLORIDE 10 MG/1
10 TABLET ORAL EVERY 4 HOURS PRN
Status: DISCONTINUED | OUTPATIENT
Start: 2020-12-07 | End: 2020-12-21 | Stop reason: HOSPADM

## 2020-12-07 RX ORDER — METOPROLOL SUCCINATE 100 MG/1
100 TABLET, EXTENDED RELEASE ORAL DAILY
Qty: 30 TABLET | Refills: 3 | Status: SHIPPED | OUTPATIENT
Start: 2020-12-08 | End: 2020-12-21 | Stop reason: HOSPADM

## 2020-12-07 RX ORDER — AMLODIPINE BESYLATE 10 MG/1
10 TABLET ORAL DAILY
Qty: 30 TABLET | Refills: 2 | Status: SHIPPED | OUTPATIENT
Start: 2020-12-08 | End: 2020-12-21 | Stop reason: HOSPADM

## 2020-12-07 RX ORDER — GABAPENTIN 100 MG/1
100 CAPSULE ORAL 3 TIMES DAILY
Status: DISCONTINUED | OUTPATIENT
Start: 2020-12-07 | End: 2020-12-10

## 2020-12-07 RX ORDER — MAGNESIUM SULFATE 1 G/100ML
1 INJECTION INTRAVENOUS ONCE
Status: COMPLETED | OUTPATIENT
Start: 2020-12-07 | End: 2020-12-07

## 2020-12-07 RX ADMIN — APIXABAN 2.5 MG: 2.5 TABLET, FILM COATED ORAL at 08:42

## 2020-12-07 RX ADMIN — Medication 2000 UNITS: at 16:22

## 2020-12-07 RX ADMIN — OXYCODONE HYDROCHLORIDE 10 MG: 10 TABLET ORAL at 17:44

## 2020-12-07 RX ADMIN — GABAPENTIN 100 MG: 100 CAPSULE ORAL at 20:22

## 2020-12-07 RX ADMIN — ACETAMINOPHEN 650 MG: 325 TABLET ORAL at 09:42

## 2020-12-07 RX ADMIN — SODIUM CHLORIDE 100 ML/HR: 0.9 INJECTION, SOLUTION INTRAVENOUS at 08:43

## 2020-12-07 RX ADMIN — METOPROLOL SUCCINATE 100 MG: 100 TABLET, EXTENDED RELEASE ORAL at 08:40

## 2020-12-07 RX ADMIN — TAMSULOSIN HYDROCHLORIDE 0.4 MG: 0.4 CAPSULE ORAL at 08:42

## 2020-12-07 RX ADMIN — FOLIC ACID 1 MG: 1 TABLET ORAL at 08:41

## 2020-12-07 RX ADMIN — Medication 1 TABLET: at 08:40

## 2020-12-07 RX ADMIN — DESMOPRESSIN ACETATE 21.6 MCG: 4 SOLUTION INTRAVENOUS at 19:48

## 2020-12-07 RX ADMIN — AMLODIPINE BESYLATE 5 MG: 5 TABLET ORAL at 08:42

## 2020-12-07 RX ADMIN — Medication 100 MG: at 08:41

## 2020-12-07 RX ADMIN — METOROPROLOL TARTRATE 5 MG: 5 INJECTION, SOLUTION INTRAVENOUS at 18:03

## 2020-12-07 RX ADMIN — POTASSIUM CHLORIDE 20 MEQ: 14.9 INJECTION, SOLUTION INTRAVENOUS at 10:41

## 2020-12-07 RX ADMIN — CLOPIDOGREL BISULFATE 75 MG: 75 TABLET ORAL at 08:42

## 2020-12-07 RX ADMIN — POTASSIUM CHLORIDE 20 MEQ: 14.9 INJECTION, SOLUTION INTRAVENOUS at 08:44

## 2020-12-07 RX ADMIN — MAGNESIUM SULFATE HEPTAHYDRATE 1 G: 1 INJECTION, SOLUTION INTRAVENOUS at 15:24

## 2020-12-08 DIAGNOSIS — E78.5 HLD (HYPERLIPIDEMIA): ICD-10-CM

## 2020-12-08 PROBLEM — S14.129A CENTRAL CORD SYNDROME (HCC): Status: ACTIVE | Noted: 2020-12-08

## 2020-12-08 PROBLEM — Z01.810 PREOPERATIVE CARDIOVASCULAR EXAMINATION: Status: ACTIVE | Noted: 2020-12-08

## 2020-12-08 LAB
ABO GROUP BLD: NORMAL
ANION GAP SERPL CALCULATED.3IONS-SCNC: 5 MMOL/L (ref 4–13)
ATRIAL RATE: 93 BPM
BLD GP AB SCN SERPL QL: NEGATIVE
BUN SERPL-MCNC: 32 MG/DL (ref 5–25)
CALCIUM SERPL-MCNC: 9 MG/DL (ref 8.3–10.1)
CHLORIDE SERPL-SCNC: 107 MMOL/L (ref 100–108)
CO2 SERPL-SCNC: 26 MMOL/L (ref 21–32)
CREAT SERPL-MCNC: 1.58 MG/DL (ref 0.6–1.3)
ERYTHROCYTE [DISTWIDTH] IN BLOOD BY AUTOMATED COUNT: 13.2 % (ref 11.6–15.1)
GFR SERPL CREATININE-BSD FRML MDRD: 48 ML/MIN/1.73SQ M
GLUCOSE SERPL-MCNC: 100 MG/DL (ref 65–140)
GLUCOSE SERPL-MCNC: 143 MG/DL (ref 65–140)
GLUCOSE SERPL-MCNC: 80 MG/DL (ref 65–140)
GLUCOSE SERPL-MCNC: 96 MG/DL (ref 65–140)
HCT VFR BLD AUTO: 37.4 % (ref 36.5–49.3)
HGB BLD-MCNC: 12.4 G/DL (ref 12–17)
INR PPP: 0.89 (ref 0.84–1.19)
MAGNESIUM SERPL-MCNC: 1.8 MG/DL (ref 1.6–2.6)
MCH RBC QN AUTO: 33.2 PG (ref 26.8–34.3)
MCHC RBC AUTO-ENTMCNC: 33.2 G/DL (ref 31.4–37.4)
MCV RBC AUTO: 100 FL (ref 82–98)
PA ADP BLD-ACNC: 114 PRU (ref 194–418)
PHOSPHATE SERPL-MCNC: 3.5 MG/DL (ref 2.7–4.5)
PLATELET # BLD AUTO: 151 THOUSANDS/UL (ref 149–390)
PMV BLD AUTO: 10.7 FL (ref 8.9–12.7)
POTASSIUM SERPL-SCNC: 3.7 MMOL/L (ref 3.5–5.3)
PROTHROMBIN TIME: 12 SECONDS (ref 11.6–14.5)
QRS AXIS: 95 DEGREES
QRSD INTERVAL: 83 MS
QT INTERVAL: 338 MS
QTC INTERVAL: 409 MS
RBC # BLD AUTO: 3.73 MILLION/UL (ref 3.88–5.62)
RH BLD: POSITIVE
SODIUM SERPL-SCNC: 138 MMOL/L (ref 136–145)
SPECIMEN EXPIRATION DATE: NORMAL
T WAVE AXIS: 36 DEGREES
VENTRICULAR RATE: 88 BPM
WBC # BLD AUTO: 5.31 THOUSAND/UL (ref 4.31–10.16)

## 2020-12-08 PROCEDURE — 99232 SBSQ HOSP IP/OBS MODERATE 35: CPT | Performed by: INTERNAL MEDICINE

## 2020-12-08 PROCEDURE — 86901 BLOOD TYPING SEROLOGIC RH(D): CPT | Performed by: PHYSICIAN ASSISTANT

## 2020-12-08 PROCEDURE — 85576 BLOOD PLATELET AGGREGATION: CPT | Performed by: SURGERY

## 2020-12-08 PROCEDURE — 86900 BLOOD TYPING SEROLOGIC ABO: CPT | Performed by: PHYSICIAN ASSISTANT

## 2020-12-08 PROCEDURE — 85610 PROTHROMBIN TIME: CPT | Performed by: STUDENT IN AN ORGANIZED HEALTH CARE EDUCATION/TRAINING PROGRAM

## 2020-12-08 PROCEDURE — 82948 REAGENT STRIP/BLOOD GLUCOSE: CPT

## 2020-12-08 PROCEDURE — 99233 SBSQ HOSP IP/OBS HIGH 50: CPT | Performed by: SURGERY

## 2020-12-08 PROCEDURE — 99233 SBSQ HOSP IP/OBS HIGH 50: CPT | Performed by: NEUROLOGICAL SURGERY

## 2020-12-08 PROCEDURE — 83735 ASSAY OF MAGNESIUM: CPT | Performed by: PHYSICIAN ASSISTANT

## 2020-12-08 PROCEDURE — 85027 COMPLETE CBC AUTOMATED: CPT | Performed by: PHYSICIAN ASSISTANT

## 2020-12-08 PROCEDURE — NC001 PR NO CHARGE: Performed by: INTERNAL MEDICINE

## 2020-12-08 PROCEDURE — 86850 RBC ANTIBODY SCREEN: CPT | Performed by: PHYSICIAN ASSISTANT

## 2020-12-08 PROCEDURE — 80048 BASIC METABOLIC PNL TOTAL CA: CPT | Performed by: PHYSICIAN ASSISTANT

## 2020-12-08 PROCEDURE — 84100 ASSAY OF PHOSPHORUS: CPT | Performed by: PHYSICIAN ASSISTANT

## 2020-12-08 PROCEDURE — 93010 ELECTROCARDIOGRAM REPORT: CPT | Performed by: INTERNAL MEDICINE

## 2020-12-08 RX ORDER — ROSUVASTATIN CALCIUM 20 MG/1
20 TABLET, COATED ORAL DAILY
Qty: 30 TABLET | Refills: 3 | Status: SHIPPED | OUTPATIENT
Start: 2020-12-08 | End: 2020-12-21 | Stop reason: HOSPADM

## 2020-12-08 RX ORDER — AMLODIPINE BESYLATE 10 MG/1
10 TABLET ORAL DAILY
Status: DISCONTINUED | OUTPATIENT
Start: 2020-12-08 | End: 2020-12-08

## 2020-12-08 RX ORDER — POTASSIUM CHLORIDE 20 MEQ/1
40 TABLET, EXTENDED RELEASE ORAL ONCE
Status: COMPLETED | OUTPATIENT
Start: 2020-12-08 | End: 2020-12-08

## 2020-12-08 RX ORDER — HYDRALAZINE HYDROCHLORIDE 20 MG/ML
5 INJECTION INTRAMUSCULAR; INTRAVENOUS EVERY 6 HOURS PRN
Status: DISCONTINUED | OUTPATIENT
Start: 2020-12-08 | End: 2020-12-21 | Stop reason: HOSPADM

## 2020-12-08 RX ORDER — METOPROLOL TARTRATE 5 MG/5ML
2.5 INJECTION INTRAVENOUS EVERY 6 HOURS PRN
Status: DISCONTINUED | OUTPATIENT
Start: 2020-12-08 | End: 2020-12-08

## 2020-12-08 RX ORDER — METOPROLOL SUCCINATE 25 MG/1
100 TABLET, EXTENDED RELEASE ORAL DAILY
Status: DISCONTINUED | OUTPATIENT
Start: 2020-12-08 | End: 2020-12-08

## 2020-12-08 RX ORDER — HEPARIN SODIUM 5000 [USP'U]/ML
5000 INJECTION, SOLUTION INTRAVENOUS; SUBCUTANEOUS EVERY 8 HOURS SCHEDULED
Status: COMPLETED | OUTPATIENT
Start: 2020-12-08 | End: 2020-12-09

## 2020-12-08 RX ORDER — MAGNESIUM SULFATE HEPTAHYDRATE 40 MG/ML
2 INJECTION, SOLUTION INTRAVENOUS ONCE
Status: COMPLETED | OUTPATIENT
Start: 2020-12-08 | End: 2020-12-08

## 2020-12-08 RX ORDER — METOPROLOL TARTRATE 5 MG/5ML
5 INJECTION INTRAVENOUS EVERY 6 HOURS
Status: DISCONTINUED | OUTPATIENT
Start: 2020-12-08 | End: 2020-12-14

## 2020-12-08 RX ORDER — PRAVASTATIN SODIUM 40 MG
40 TABLET ORAL
Status: DISCONTINUED | OUTPATIENT
Start: 2020-12-08 | End: 2020-12-21 | Stop reason: HOSPADM

## 2020-12-08 RX ADMIN — METOROPROLOL TARTRATE 5 MG: 5 INJECTION, SOLUTION INTRAVENOUS at 05:22

## 2020-12-08 RX ADMIN — OXYCODONE HYDROCHLORIDE 5 MG: 5 TABLET ORAL at 14:17

## 2020-12-08 RX ADMIN — GABAPENTIN 100 MG: 100 CAPSULE ORAL at 09:37

## 2020-12-08 RX ADMIN — HEPARIN SODIUM 5000 UNITS: 5000 INJECTION INTRAVENOUS; SUBCUTANEOUS at 09:38

## 2020-12-08 RX ADMIN — METOROPROLOL TARTRATE 5 MG: 5 INJECTION, SOLUTION INTRAVENOUS at 21:33

## 2020-12-08 RX ADMIN — THIAMINE HCL TAB 100 MG 100 MG: 100 TAB at 09:37

## 2020-12-08 RX ADMIN — METOROPROLOL TARTRATE 5 MG: 5 INJECTION, SOLUTION INTRAVENOUS at 15:41

## 2020-12-08 RX ADMIN — Medication 1 TABLET: at 09:38

## 2020-12-08 RX ADMIN — GABAPENTIN 100 MG: 100 CAPSULE ORAL at 15:41

## 2020-12-08 RX ADMIN — MAGNESIUM SULFATE HEPTAHYDRATE 2 G: 40 INJECTION, SOLUTION INTRAVENOUS at 07:53

## 2020-12-08 RX ADMIN — HEPARIN SODIUM 5000 UNITS: 5000 INJECTION INTRAVENOUS; SUBCUTANEOUS at 17:47

## 2020-12-08 RX ADMIN — METOROPROLOL TARTRATE 5 MG: 5 INJECTION, SOLUTION INTRAVENOUS at 10:08

## 2020-12-08 RX ADMIN — OXYCODONE HYDROCHLORIDE 5 MG: 5 TABLET ORAL at 21:40

## 2020-12-08 RX ADMIN — OXYCODONE HYDROCHLORIDE 5 MG: 5 TABLET ORAL at 09:37

## 2020-12-08 RX ADMIN — POLYETHYLENE GLYCOL 3350 17 G: 17 POWDER, FOR SOLUTION ORAL at 09:38

## 2020-12-08 RX ADMIN — FOLIC ACID 1 MG: 1 TABLET ORAL at 09:37

## 2020-12-08 RX ADMIN — POTASSIUM CHLORIDE 40 MEQ: 1500 TABLET, EXTENDED RELEASE ORAL at 09:37

## 2020-12-08 RX ADMIN — GABAPENTIN 100 MG: 100 CAPSULE ORAL at 21:31

## 2020-12-08 RX ADMIN — METOROPROLOL TARTRATE 5 MG: 5 INJECTION, SOLUTION INTRAVENOUS at 00:32

## 2020-12-08 RX ADMIN — PRAVASTATIN SODIUM 40 MG: 40 TABLET ORAL at 15:41

## 2020-12-09 ENCOUNTER — TRANSITIONAL CARE MANAGEMENT (OUTPATIENT)
Dept: FAMILY MEDICINE CLINIC | Facility: CLINIC | Age: 60
End: 2020-12-09

## 2020-12-09 LAB
ANION GAP SERPL CALCULATED.3IONS-SCNC: 6 MMOL/L (ref 4–13)
ATRIAL RATE: 340 BPM
BASOPHILS # BLD AUTO: 0.07 THOUSANDS/ΜL (ref 0–0.1)
BASOPHILS NFR BLD AUTO: 1 % (ref 0–1)
BUN SERPL-MCNC: 35 MG/DL (ref 5–25)
CA-I BLD-SCNC: 1.14 MMOL/L (ref 1.12–1.32)
CALCIUM SERPL-MCNC: 8.8 MG/DL (ref 8.3–10.1)
CHLORIDE SERPL-SCNC: 104 MMOL/L (ref 100–108)
CO2 SERPL-SCNC: 26 MMOL/L (ref 21–32)
CREAT SERPL-MCNC: 1.53 MG/DL (ref 0.6–1.3)
EOSINOPHIL # BLD AUTO: 0.16 THOUSAND/ΜL (ref 0–0.61)
EOSINOPHIL NFR BLD AUTO: 3 % (ref 0–6)
ERYTHROCYTE [DISTWIDTH] IN BLOOD BY AUTOMATED COUNT: 13 % (ref 11.6–15.1)
GFR SERPL CREATININE-BSD FRML MDRD: 49 ML/MIN/1.73SQ M
GLUCOSE SERPL-MCNC: 102 MG/DL (ref 65–140)
GLUCOSE SERPL-MCNC: 108 MG/DL (ref 65–140)
GLUCOSE SERPL-MCNC: 121 MG/DL (ref 65–140)
GLUCOSE SERPL-MCNC: 87 MG/DL (ref 65–140)
HCT VFR BLD AUTO: 38.8 % (ref 36.5–49.3)
HGB BLD-MCNC: 12.8 G/DL (ref 12–17)
IMM GRANULOCYTES # BLD AUTO: 0.02 THOUSAND/UL (ref 0–0.2)
IMM GRANULOCYTES NFR BLD AUTO: 0 % (ref 0–2)
LYMPHOCYTES # BLD AUTO: 1.49 THOUSANDS/ΜL (ref 0.6–4.47)
LYMPHOCYTES NFR BLD AUTO: 25 % (ref 14–44)
MAGNESIUM SERPL-MCNC: 1.9 MG/DL (ref 1.6–2.6)
MCH RBC QN AUTO: 32.7 PG (ref 26.8–34.3)
MCHC RBC AUTO-ENTMCNC: 33 G/DL (ref 31.4–37.4)
MCV RBC AUTO: 99 FL (ref 82–98)
MONOCYTES # BLD AUTO: 1.41 THOUSAND/ΜL (ref 0.17–1.22)
MONOCYTES NFR BLD AUTO: 23 % (ref 4–12)
NEUTROPHILS # BLD AUTO: 2.91 THOUSANDS/ΜL (ref 1.85–7.62)
NEUTS SEG NFR BLD AUTO: 48 % (ref 43–75)
NRBC BLD AUTO-RTO: 0 /100 WBCS
PA ADP BLD-ACNC: 160 PRU (ref 194–418)
PHOSPHATE SERPL-MCNC: 3.6 MG/DL (ref 2.7–4.5)
PLATELET # BLD AUTO: 178 THOUSANDS/UL (ref 149–390)
PMV BLD AUTO: 10.6 FL (ref 8.9–12.7)
POTASSIUM SERPL-SCNC: 3.9 MMOL/L (ref 3.5–5.3)
QRS AXIS: 82 DEGREES
QRSD INTERVAL: 106 MS
QT INTERVAL: 422 MS
QTC INTERVAL: 471 MS
RBC # BLD AUTO: 3.91 MILLION/UL (ref 3.88–5.62)
SODIUM SERPL-SCNC: 136 MMOL/L (ref 136–145)
T WAVE AXIS: 78 DEGREES
VENTRICULAR RATE: 75 BPM
WBC # BLD AUTO: 6.06 THOUSAND/UL (ref 4.31–10.16)

## 2020-12-09 PROCEDURE — 80048 BASIC METABOLIC PNL TOTAL CA: CPT | Performed by: SURGERY

## 2020-12-09 PROCEDURE — 82948 REAGENT STRIP/BLOOD GLUCOSE: CPT

## 2020-12-09 PROCEDURE — 85025 COMPLETE CBC W/AUTO DIFF WBC: CPT | Performed by: SURGERY

## 2020-12-09 PROCEDURE — 83735 ASSAY OF MAGNESIUM: CPT | Performed by: SURGERY

## 2020-12-09 PROCEDURE — 99233 SBSQ HOSP IP/OBS HIGH 50: CPT | Performed by: NEUROLOGICAL SURGERY

## 2020-12-09 PROCEDURE — 93010 ELECTROCARDIOGRAM REPORT: CPT | Performed by: INTERNAL MEDICINE

## 2020-12-09 PROCEDURE — 99233 SBSQ HOSP IP/OBS HIGH 50: CPT | Performed by: SURGERY

## 2020-12-09 PROCEDURE — 99232 SBSQ HOSP IP/OBS MODERATE 35: CPT | Performed by: INTERNAL MEDICINE

## 2020-12-09 PROCEDURE — 85576 BLOOD PLATELET AGGREGATION: CPT | Performed by: PHYSICIAN ASSISTANT

## 2020-12-09 PROCEDURE — 82330 ASSAY OF CALCIUM: CPT | Performed by: SURGERY

## 2020-12-09 PROCEDURE — 84100 ASSAY OF PHOSPHORUS: CPT | Performed by: SURGERY

## 2020-12-09 RX ORDER — HEPARIN SODIUM 5000 [USP'U]/ML
5000 INJECTION, SOLUTION INTRAVENOUS; SUBCUTANEOUS EVERY 8 HOURS
Status: COMPLETED | OUTPATIENT
Start: 2020-12-09 | End: 2020-12-10

## 2020-12-09 RX ADMIN — PRAVASTATIN SODIUM 40 MG: 40 TABLET ORAL at 16:15

## 2020-12-09 RX ADMIN — OXYCODONE HYDROCHLORIDE 5 MG: 5 TABLET ORAL at 23:50

## 2020-12-09 RX ADMIN — HEPARIN SODIUM 5000 UNITS: 5000 INJECTION INTRAVENOUS; SUBCUTANEOUS at 01:26

## 2020-12-09 RX ADMIN — GABAPENTIN 100 MG: 100 CAPSULE ORAL at 21:35

## 2020-12-09 RX ADMIN — OXYCODONE HYDROCHLORIDE 5 MG: 5 TABLET ORAL at 19:30

## 2020-12-09 RX ADMIN — METOROPROLOL TARTRATE 5 MG: 5 INJECTION, SOLUTION INTRAVENOUS at 16:14

## 2020-12-09 RX ADMIN — METOROPROLOL TARTRATE 5 MG: 5 INJECTION, SOLUTION INTRAVENOUS at 21:36

## 2020-12-09 RX ADMIN — POLYETHYLENE GLYCOL 3350 17 G: 17 POWDER, FOR SOLUTION ORAL at 09:29

## 2020-12-09 RX ADMIN — OXYCODONE HYDROCHLORIDE 5 MG: 5 TABLET ORAL at 13:06

## 2020-12-09 RX ADMIN — OXYCODONE HYDROCHLORIDE 5 MG: 5 TABLET ORAL at 09:27

## 2020-12-09 RX ADMIN — GABAPENTIN 100 MG: 100 CAPSULE ORAL at 16:13

## 2020-12-09 RX ADMIN — HEPARIN SODIUM 5000 UNITS: 5000 INJECTION INTRAVENOUS; SUBCUTANEOUS at 09:28

## 2020-12-09 RX ADMIN — Medication 1 TABLET: at 09:27

## 2020-12-09 RX ADMIN — HEPARIN SODIUM 5000 UNITS: 5000 INJECTION INTRAVENOUS; SUBCUTANEOUS at 16:14

## 2020-12-09 RX ADMIN — THIAMINE HCL TAB 100 MG 100 MG: 100 TAB at 09:27

## 2020-12-09 RX ADMIN — FOLIC ACID 1 MG: 1 TABLET ORAL at 09:27

## 2020-12-09 RX ADMIN — GABAPENTIN 100 MG: 100 CAPSULE ORAL at 09:27

## 2020-12-09 RX ADMIN — METOROPROLOL TARTRATE 5 MG: 5 INJECTION, SOLUTION INTRAVENOUS at 04:11

## 2020-12-10 LAB
ANION GAP SERPL CALCULATED.3IONS-SCNC: 7 MMOL/L (ref 4–13)
BASOPHILS # BLD AUTO: 0.07 THOUSANDS/ΜL (ref 0–0.1)
BASOPHILS NFR BLD AUTO: 1 % (ref 0–1)
BUN SERPL-MCNC: 40 MG/DL (ref 5–25)
CA-I BLD-SCNC: 1.17 MMOL/L (ref 1.12–1.32)
CALCIUM SERPL-MCNC: 9.3 MG/DL (ref 8.3–10.1)
CHLORIDE SERPL-SCNC: 101 MMOL/L (ref 100–108)
CO2 SERPL-SCNC: 26 MMOL/L (ref 21–32)
CREAT SERPL-MCNC: 1.51 MG/DL (ref 0.6–1.3)
EOSINOPHIL # BLD AUTO: 0.16 THOUSAND/ΜL (ref 0–0.61)
EOSINOPHIL NFR BLD AUTO: 3 % (ref 0–6)
ERYTHROCYTE [DISTWIDTH] IN BLOOD BY AUTOMATED COUNT: 12.8 % (ref 11.6–15.1)
GFR SERPL CREATININE-BSD FRML MDRD: 50 ML/MIN/1.73SQ M
GLUCOSE SERPL-MCNC: 100 MG/DL (ref 65–140)
GLUCOSE SERPL-MCNC: 122 MG/DL (ref 65–140)
GLUCOSE SERPL-MCNC: 139 MG/DL (ref 65–140)
GLUCOSE SERPL-MCNC: 98 MG/DL (ref 65–140)
HCT VFR BLD AUTO: 36.1 % (ref 36.5–49.3)
HGB BLD-MCNC: 11.8 G/DL (ref 12–17)
IMM GRANULOCYTES # BLD AUTO: 0.01 THOUSAND/UL (ref 0–0.2)
IMM GRANULOCYTES NFR BLD AUTO: 0 % (ref 0–2)
LYMPHOCYTES # BLD AUTO: 1.45 THOUSANDS/ΜL (ref 0.6–4.47)
LYMPHOCYTES NFR BLD AUTO: 23 % (ref 14–44)
MAGNESIUM SERPL-MCNC: 1.9 MG/DL (ref 1.6–2.6)
MCH RBC QN AUTO: 32.8 PG (ref 26.8–34.3)
MCHC RBC AUTO-ENTMCNC: 32.7 G/DL (ref 31.4–37.4)
MCV RBC AUTO: 100 FL (ref 82–98)
MONOCYTES # BLD AUTO: 1.64 THOUSAND/ΜL (ref 0.17–1.22)
MONOCYTES NFR BLD AUTO: 26 % (ref 4–12)
NEUTROPHILS # BLD AUTO: 3.05 THOUSANDS/ΜL (ref 1.85–7.62)
NEUTS SEG NFR BLD AUTO: 47 % (ref 43–75)
NRBC BLD AUTO-RTO: 0 /100 WBCS
PA ADP BLD-ACNC: 159 PRU (ref 194–418)
PHOSPHATE SERPL-MCNC: 4 MG/DL (ref 2.7–4.5)
PLATELET # BLD AUTO: 172 THOUSANDS/UL (ref 149–390)
PMV BLD AUTO: 10.8 FL (ref 8.9–12.7)
POTASSIUM SERPL-SCNC: 3.9 MMOL/L (ref 3.5–5.3)
RBC # BLD AUTO: 3.6 MILLION/UL (ref 3.88–5.62)
SODIUM SERPL-SCNC: 134 MMOL/L (ref 136–145)
WBC # BLD AUTO: 6.38 THOUSAND/UL (ref 4.31–10.16)

## 2020-12-10 PROCEDURE — 80048 BASIC METABOLIC PNL TOTAL CA: CPT | Performed by: PHYSICIAN ASSISTANT

## 2020-12-10 PROCEDURE — 99233 SBSQ HOSP IP/OBS HIGH 50: CPT | Performed by: SURGERY

## 2020-12-10 PROCEDURE — 99232 SBSQ HOSP IP/OBS MODERATE 35: CPT | Performed by: INTERNAL MEDICINE

## 2020-12-10 PROCEDURE — 82948 REAGENT STRIP/BLOOD GLUCOSE: CPT

## 2020-12-10 PROCEDURE — 97163 PT EVAL HIGH COMPLEX 45 MIN: CPT

## 2020-12-10 PROCEDURE — 99233 SBSQ HOSP IP/OBS HIGH 50: CPT | Performed by: NEUROLOGICAL SURGERY

## 2020-12-10 PROCEDURE — 85576 BLOOD PLATELET AGGREGATION: CPT | Performed by: PHYSICIAN ASSISTANT

## 2020-12-10 PROCEDURE — 83735 ASSAY OF MAGNESIUM: CPT | Performed by: PHYSICIAN ASSISTANT

## 2020-12-10 PROCEDURE — 84100 ASSAY OF PHOSPHORUS: CPT | Performed by: SURGERY

## 2020-12-10 PROCEDURE — 82330 ASSAY OF CALCIUM: CPT | Performed by: SURGERY

## 2020-12-10 PROCEDURE — 97167 OT EVAL HIGH COMPLEX 60 MIN: CPT

## 2020-12-10 PROCEDURE — 85025 COMPLETE CBC W/AUTO DIFF WBC: CPT | Performed by: SURGERY

## 2020-12-10 RX ORDER — DOCUSATE SODIUM 100 MG/1
100 CAPSULE, LIQUID FILLED ORAL 2 TIMES DAILY
Status: DISCONTINUED | OUTPATIENT
Start: 2020-12-10 | End: 2020-12-21 | Stop reason: HOSPADM

## 2020-12-10 RX ORDER — FLUTICASONE FUROATE AND VILANTEROL 200; 25 UG/1; UG/1
1 POWDER RESPIRATORY (INHALATION) DAILY
Status: DISCONTINUED | OUTPATIENT
Start: 2020-12-10 | End: 2020-12-21 | Stop reason: HOSPADM

## 2020-12-10 RX ORDER — POTASSIUM CHLORIDE 20 MEQ/1
20 TABLET, EXTENDED RELEASE ORAL ONCE
Status: COMPLETED | OUTPATIENT
Start: 2020-12-10 | End: 2020-12-10

## 2020-12-10 RX ORDER — TAMSULOSIN HYDROCHLORIDE 0.4 MG/1
0.4 CAPSULE ORAL DAILY
Status: DISCONTINUED | OUTPATIENT
Start: 2020-12-10 | End: 2020-12-21 | Stop reason: HOSPADM

## 2020-12-10 RX ORDER — HEPARIN SODIUM 5000 [USP'U]/ML
5000 INJECTION, SOLUTION INTRAVENOUS; SUBCUTANEOUS EVERY 8 HOURS
Status: COMPLETED | OUTPATIENT
Start: 2020-12-10 | End: 2020-12-15

## 2020-12-10 RX ORDER — MAGNESIUM SULFATE HEPTAHYDRATE 40 MG/ML
2 INJECTION, SOLUTION INTRAVENOUS ONCE
Status: COMPLETED | OUTPATIENT
Start: 2020-12-10 | End: 2020-12-10

## 2020-12-10 RX ORDER — GABAPENTIN 100 MG/1
200 CAPSULE ORAL 3 TIMES DAILY
Status: DISCONTINUED | OUTPATIENT
Start: 2020-12-10 | End: 2020-12-21 | Stop reason: HOSPADM

## 2020-12-10 RX ORDER — SENNOSIDES 8.6 MG
2 TABLET ORAL
Status: DISCONTINUED | OUTPATIENT
Start: 2020-12-10 | End: 2020-12-21 | Stop reason: HOSPADM

## 2020-12-10 RX ADMIN — MAGNESIUM SULFATE HEPTAHYDRATE 2 G: 40 INJECTION, SOLUTION INTRAVENOUS at 06:48

## 2020-12-10 RX ADMIN — HEPARIN SODIUM 5000 UNITS: 5000 INJECTION INTRAVENOUS; SUBCUTANEOUS at 13:50

## 2020-12-10 RX ADMIN — FOLIC ACID 1 MG: 1 TABLET ORAL at 09:47

## 2020-12-10 RX ADMIN — Medication 1 TABLET: at 09:47

## 2020-12-10 RX ADMIN — OXYCODONE HYDROCHLORIDE 10 MG: 10 TABLET ORAL at 22:45

## 2020-12-10 RX ADMIN — GABAPENTIN 200 MG: 100 CAPSULE ORAL at 15:45

## 2020-12-10 RX ADMIN — TAMSULOSIN HYDROCHLORIDE 0.4 MG: 0.4 CAPSULE ORAL at 10:23

## 2020-12-10 RX ADMIN — DOCUSATE SODIUM 100 MG: 100 CAPSULE ORAL at 09:47

## 2020-12-10 RX ADMIN — POTASSIUM CHLORIDE 20 MEQ: 1500 TABLET, EXTENDED RELEASE ORAL at 09:47

## 2020-12-10 RX ADMIN — METOROPROLOL TARTRATE 5 MG: 5 INJECTION, SOLUTION INTRAVENOUS at 04:40

## 2020-12-10 RX ADMIN — PRAVASTATIN SODIUM 40 MG: 40 TABLET ORAL at 15:45

## 2020-12-10 RX ADMIN — HEPARIN SODIUM 5000 UNITS: 5000 INJECTION INTRAVENOUS; SUBCUTANEOUS at 00:30

## 2020-12-10 RX ADMIN — HEPARIN SODIUM 5000 UNITS: 5000 INJECTION INTRAVENOUS; SUBCUTANEOUS at 22:45

## 2020-12-10 RX ADMIN — METOROPROLOL TARTRATE 5 MG: 5 INJECTION, SOLUTION INTRAVENOUS at 22:45

## 2020-12-10 RX ADMIN — DOCUSATE SODIUM 100 MG: 100 CAPSULE ORAL at 17:15

## 2020-12-10 RX ADMIN — INSULIN LISPRO 1 UNITS: 100 INJECTION, SOLUTION INTRAVENOUS; SUBCUTANEOUS at 22:49

## 2020-12-10 RX ADMIN — THIAMINE HCL TAB 100 MG 100 MG: 100 TAB at 09:47

## 2020-12-10 RX ADMIN — GABAPENTIN 200 MG: 100 CAPSULE ORAL at 09:47

## 2020-12-10 RX ADMIN — METOROPROLOL TARTRATE 5 MG: 5 INJECTION, SOLUTION INTRAVENOUS at 15:45

## 2020-12-10 RX ADMIN — FLUTICASONE FUROATE AND VILANTEROL TRIFENATATE 1 PUFF: 200; 25 POWDER RESPIRATORY (INHALATION) at 09:48

## 2020-12-10 RX ADMIN — SENNOSIDES 17.2 MG: 8.6 TABLET ORAL at 22:45

## 2020-12-10 RX ADMIN — GABAPENTIN 200 MG: 100 CAPSULE ORAL at 22:45

## 2020-12-11 LAB
ANION GAP SERPL CALCULATED.3IONS-SCNC: 7 MMOL/L (ref 4–13)
BUN SERPL-MCNC: 39 MG/DL (ref 5–25)
CALCIUM SERPL-MCNC: 9.7 MG/DL (ref 8.3–10.1)
CHLORIDE SERPL-SCNC: 105 MMOL/L (ref 100–108)
CO2 SERPL-SCNC: 25 MMOL/L (ref 21–32)
CREAT SERPL-MCNC: 1.54 MG/DL (ref 0.6–1.3)
GFR SERPL CREATININE-BSD FRML MDRD: 49 ML/MIN/1.73SQ M
GLUCOSE SERPL-MCNC: 115 MG/DL (ref 65–140)
GLUCOSE SERPL-MCNC: 130 MG/DL (ref 65–140)
GLUCOSE SERPL-MCNC: 151 MG/DL (ref 65–140)
GLUCOSE SERPL-MCNC: 196 MG/DL (ref 65–140)
GLUCOSE SERPL-MCNC: 206 MG/DL (ref 65–140)
GLUCOSE SERPL-MCNC: 97 MG/DL (ref 65–140)
MAGNESIUM SERPL-MCNC: 2.3 MG/DL (ref 1.6–2.6)
PA ADP BLD-ACNC: 215 PRU (ref 194–418)
POTASSIUM SERPL-SCNC: 4.2 MMOL/L (ref 3.5–5.3)
SODIUM SERPL-SCNC: 137 MMOL/L (ref 136–145)

## 2020-12-11 PROCEDURE — 80048 BASIC METABOLIC PNL TOTAL CA: CPT | Performed by: SURGERY

## 2020-12-11 PROCEDURE — 99233 SBSQ HOSP IP/OBS HIGH 50: CPT | Performed by: SURGERY

## 2020-12-11 PROCEDURE — 83735 ASSAY OF MAGNESIUM: CPT | Performed by: PHYSICIAN ASSISTANT

## 2020-12-11 PROCEDURE — 99233 SBSQ HOSP IP/OBS HIGH 50: CPT | Performed by: NEUROLOGICAL SURGERY

## 2020-12-11 PROCEDURE — NC001 PR NO CHARGE: Performed by: SURGERY

## 2020-12-11 PROCEDURE — 85576 BLOOD PLATELET AGGREGATION: CPT | Performed by: SURGERY

## 2020-12-11 PROCEDURE — 82948 REAGENT STRIP/BLOOD GLUCOSE: CPT

## 2020-12-11 RX ORDER — NICOTINE 21 MG/24HR
14 PATCH, TRANSDERMAL 24 HOURS TRANSDERMAL DAILY
Status: DISCONTINUED | OUTPATIENT
Start: 2020-12-11 | End: 2020-12-21 | Stop reason: HOSPADM

## 2020-12-11 RX ADMIN — GABAPENTIN 200 MG: 100 CAPSULE ORAL at 15:59

## 2020-12-11 RX ADMIN — THIAMINE HCL TAB 100 MG 100 MG: 100 TAB at 09:30

## 2020-12-11 RX ADMIN — INSULIN LISPRO 1 UNITS: 100 INJECTION, SOLUTION INTRAVENOUS; SUBCUTANEOUS at 21:22

## 2020-12-11 RX ADMIN — GABAPENTIN 200 MG: 100 CAPSULE ORAL at 09:30

## 2020-12-11 RX ADMIN — TAMSULOSIN HYDROCHLORIDE 0.4 MG: 0.4 CAPSULE ORAL at 09:32

## 2020-12-11 RX ADMIN — DOCUSATE SODIUM 100 MG: 100 CAPSULE ORAL at 09:30

## 2020-12-11 RX ADMIN — METOROPROLOL TARTRATE 5 MG: 5 INJECTION, SOLUTION INTRAVENOUS at 05:00

## 2020-12-11 RX ADMIN — GABAPENTIN 200 MG: 100 CAPSULE ORAL at 21:12

## 2020-12-11 RX ADMIN — OXYCODONE HYDROCHLORIDE 10 MG: 10 TABLET ORAL at 21:12

## 2020-12-11 RX ADMIN — PRAVASTATIN SODIUM 40 MG: 40 TABLET ORAL at 15:59

## 2020-12-11 RX ADMIN — HEPARIN SODIUM 5000 UNITS: 5000 INJECTION INTRAVENOUS; SUBCUTANEOUS at 21:12

## 2020-12-11 RX ADMIN — FLUTICASONE FUROATE AND VILANTEROL TRIFENATATE 1 PUFF: 200; 25 POWDER RESPIRATORY (INHALATION) at 09:40

## 2020-12-11 RX ADMIN — FOLIC ACID 1 MG: 1 TABLET ORAL at 09:31

## 2020-12-11 RX ADMIN — METOROPROLOL TARTRATE 5 MG: 5 INJECTION, SOLUTION INTRAVENOUS at 21:12

## 2020-12-11 RX ADMIN — HEPARIN SODIUM 5000 UNITS: 5000 INJECTION INTRAVENOUS; SUBCUTANEOUS at 05:00

## 2020-12-11 RX ADMIN — INSULIN LISPRO 1 UNITS: 100 INJECTION, SOLUTION INTRAVENOUS; SUBCUTANEOUS at 16:19

## 2020-12-11 RX ADMIN — HEPARIN SODIUM 5000 UNITS: 5000 INJECTION INTRAVENOUS; SUBCUTANEOUS at 15:21

## 2020-12-11 RX ADMIN — METOROPROLOL TARTRATE 5 MG: 5 INJECTION, SOLUTION INTRAVENOUS at 09:31

## 2020-12-11 RX ADMIN — Medication 1 TABLET: at 09:30

## 2020-12-11 RX ADMIN — DOCUSATE SODIUM 100 MG: 100 CAPSULE ORAL at 18:27

## 2020-12-12 LAB
GLUCOSE SERPL-MCNC: 102 MG/DL (ref 65–140)
GLUCOSE SERPL-MCNC: 126 MG/DL (ref 65–140)
GLUCOSE SERPL-MCNC: 213 MG/DL (ref 65–140)
GLUCOSE SERPL-MCNC: 72 MG/DL (ref 65–140)

## 2020-12-12 PROCEDURE — 99232 SBSQ HOSP IP/OBS MODERATE 35: CPT | Performed by: PHYSICIAN ASSISTANT

## 2020-12-12 PROCEDURE — 82948 REAGENT STRIP/BLOOD GLUCOSE: CPT

## 2020-12-12 PROCEDURE — 99232 SBSQ HOSP IP/OBS MODERATE 35: CPT | Performed by: SURGERY

## 2020-12-12 RX ADMIN — METOROPROLOL TARTRATE 5 MG: 5 INJECTION, SOLUTION INTRAVENOUS at 04:11

## 2020-12-12 RX ADMIN — GABAPENTIN 200 MG: 100 CAPSULE ORAL at 08:57

## 2020-12-12 RX ADMIN — HEPARIN SODIUM 5000 UNITS: 5000 INJECTION INTRAVENOUS; SUBCUTANEOUS at 21:26

## 2020-12-12 RX ADMIN — INSULIN LISPRO 2 UNITS: 100 INJECTION, SOLUTION INTRAVENOUS; SUBCUTANEOUS at 08:57

## 2020-12-12 RX ADMIN — Medication 1 TABLET: at 08:57

## 2020-12-12 RX ADMIN — FLUTICASONE FUROATE AND VILANTEROL TRIFENATATE 1 PUFF: 200; 25 POWDER RESPIRATORY (INHALATION) at 08:58

## 2020-12-12 RX ADMIN — HEPARIN SODIUM 5000 UNITS: 5000 INJECTION INTRAVENOUS; SUBCUTANEOUS at 13:16

## 2020-12-12 RX ADMIN — FOLIC ACID 1 MG: 1 TABLET ORAL at 08:57

## 2020-12-12 RX ADMIN — OXYCODONE HYDROCHLORIDE 10 MG: 10 TABLET ORAL at 13:16

## 2020-12-12 RX ADMIN — GABAPENTIN 200 MG: 100 CAPSULE ORAL at 17:44

## 2020-12-12 RX ADMIN — HEPARIN SODIUM 5000 UNITS: 5000 INJECTION INTRAVENOUS; SUBCUTANEOUS at 05:21

## 2020-12-12 RX ADMIN — GABAPENTIN 200 MG: 100 CAPSULE ORAL at 21:21

## 2020-12-12 RX ADMIN — PRAVASTATIN SODIUM 40 MG: 40 TABLET ORAL at 17:44

## 2020-12-12 RX ADMIN — TAMSULOSIN HYDROCHLORIDE 0.4 MG: 0.4 CAPSULE ORAL at 08:57

## 2020-12-12 RX ADMIN — METOROPROLOL TARTRATE 5 MG: 5 INJECTION, SOLUTION INTRAVENOUS at 11:28

## 2020-12-12 RX ADMIN — THIAMINE HCL TAB 100 MG 100 MG: 100 TAB at 08:57

## 2020-12-13 ENCOUNTER — APPOINTMENT (INPATIENT)
Dept: RADIOLOGY | Facility: HOSPITAL | Age: 60
DRG: 029 | End: 2020-12-13
Payer: MEDICARE

## 2020-12-13 LAB
GLUCOSE SERPL-MCNC: 124 MG/DL (ref 65–140)
GLUCOSE SERPL-MCNC: 157 MG/DL (ref 65–140)
GLUCOSE SERPL-MCNC: 93 MG/DL (ref 65–140)
GLUCOSE SERPL-MCNC: 94 MG/DL (ref 65–140)

## 2020-12-13 PROCEDURE — 99232 SBSQ HOSP IP/OBS MODERATE 35: CPT | Performed by: SURGERY

## 2020-12-13 PROCEDURE — 82948 REAGENT STRIP/BLOOD GLUCOSE: CPT

## 2020-12-13 PROCEDURE — 99232 SBSQ HOSP IP/OBS MODERATE 35: CPT | Performed by: PHYSICIAN ASSISTANT

## 2020-12-13 PROCEDURE — 72040 X-RAY EXAM NECK SPINE 2-3 VW: CPT

## 2020-12-13 RX ADMIN — PRAVASTATIN SODIUM 40 MG: 40 TABLET ORAL at 18:12

## 2020-12-13 RX ADMIN — GABAPENTIN 200 MG: 100 CAPSULE ORAL at 21:49

## 2020-12-13 RX ADMIN — GABAPENTIN 200 MG: 100 CAPSULE ORAL at 18:11

## 2020-12-13 RX ADMIN — HEPARIN SODIUM 5000 UNITS: 5000 INJECTION INTRAVENOUS; SUBCUTANEOUS at 13:20

## 2020-12-13 RX ADMIN — INSULIN LISPRO 1 UNITS: 100 INJECTION, SOLUTION INTRAVENOUS; SUBCUTANEOUS at 21:48

## 2020-12-13 RX ADMIN — FOLIC ACID 1 MG: 1 TABLET ORAL at 08:14

## 2020-12-13 RX ADMIN — OXYCODONE HYDROCHLORIDE 10 MG: 10 TABLET ORAL at 13:20

## 2020-12-13 RX ADMIN — METOROPROLOL TARTRATE 5 MG: 5 INJECTION, SOLUTION INTRAVENOUS at 05:32

## 2020-12-13 RX ADMIN — FLUTICASONE FUROATE AND VILANTEROL TRIFENATATE 1 PUFF: 200; 25 POWDER RESPIRATORY (INHALATION) at 08:14

## 2020-12-13 RX ADMIN — THIAMINE HCL TAB 100 MG 100 MG: 100 TAB at 08:15

## 2020-12-13 RX ADMIN — METOROPROLOL TARTRATE 5 MG: 5 INJECTION, SOLUTION INTRAVENOUS at 11:41

## 2020-12-13 RX ADMIN — OXYCODONE HYDROCHLORIDE 10 MG: 10 TABLET ORAL at 01:49

## 2020-12-13 RX ADMIN — TAMSULOSIN HYDROCHLORIDE 0.4 MG: 0.4 CAPSULE ORAL at 08:14

## 2020-12-13 RX ADMIN — GABAPENTIN 200 MG: 100 CAPSULE ORAL at 08:14

## 2020-12-13 RX ADMIN — OXYCODONE HYDROCHLORIDE 10 MG: 10 TABLET ORAL at 21:49

## 2020-12-13 RX ADMIN — Medication 1 TABLET: at 08:14

## 2020-12-13 RX ADMIN — METOROPROLOL TARTRATE 5 MG: 5 INJECTION, SOLUTION INTRAVENOUS at 18:11

## 2020-12-13 RX ADMIN — HEPARIN SODIUM 5000 UNITS: 5000 INJECTION INTRAVENOUS; SUBCUTANEOUS at 05:26

## 2020-12-13 RX ADMIN — HEPARIN SODIUM 5000 UNITS: 5000 INJECTION INTRAVENOUS; SUBCUTANEOUS at 21:48

## 2020-12-13 RX ADMIN — METOROPROLOL TARTRATE 5 MG: 5 INJECTION, SOLUTION INTRAVENOUS at 21:53

## 2020-12-14 PROBLEM — S01.81XA FOREHEAD LACERATION: Status: ACTIVE | Noted: 2020-12-14

## 2020-12-14 LAB
GLUCOSE SERPL-MCNC: 106 MG/DL (ref 65–140)
GLUCOSE SERPL-MCNC: 124 MG/DL (ref 65–140)
GLUCOSE SERPL-MCNC: 90 MG/DL (ref 65–140)
GLUCOSE SERPL-MCNC: 99 MG/DL (ref 65–140)

## 2020-12-14 PROCEDURE — 82948 REAGENT STRIP/BLOOD GLUCOSE: CPT

## 2020-12-14 PROCEDURE — 99232 SBSQ HOSP IP/OBS MODERATE 35: CPT | Performed by: SURGERY

## 2020-12-14 RX ORDER — RANOLAZINE 500 MG/1
500 TABLET, EXTENDED RELEASE ORAL EVERY 12 HOURS SCHEDULED
Status: DISCONTINUED | OUTPATIENT
Start: 2020-12-14 | End: 2020-12-21 | Stop reason: HOSPADM

## 2020-12-14 RX ORDER — AMLODIPINE BESYLATE 5 MG/1
5 TABLET ORAL DAILY
Status: DISCONTINUED | OUTPATIENT
Start: 2020-12-14 | End: 2020-12-14

## 2020-12-14 RX ADMIN — RANOLAZINE 500 MG: 500 TABLET, FILM COATED, EXTENDED RELEASE ORAL at 21:20

## 2020-12-14 RX ADMIN — OXYCODONE HYDROCHLORIDE 10 MG: 10 TABLET ORAL at 14:06

## 2020-12-14 RX ADMIN — TAMSULOSIN HYDROCHLORIDE 0.4 MG: 0.4 CAPSULE ORAL at 08:29

## 2020-12-14 RX ADMIN — METOPROLOL TARTRATE 25 MG: 25 TABLET, FILM COATED ORAL at 21:18

## 2020-12-14 RX ADMIN — HEPARIN SODIUM 5000 UNITS: 5000 INJECTION INTRAVENOUS; SUBCUTANEOUS at 21:19

## 2020-12-14 RX ADMIN — THIAMINE HCL TAB 100 MG 100 MG: 100 TAB at 08:28

## 2020-12-14 RX ADMIN — HEPARIN SODIUM 5000 UNITS: 5000 INJECTION INTRAVENOUS; SUBCUTANEOUS at 14:05

## 2020-12-14 RX ADMIN — HEPARIN SODIUM 5000 UNITS: 5000 INJECTION INTRAVENOUS; SUBCUTANEOUS at 05:29

## 2020-12-14 RX ADMIN — GABAPENTIN 200 MG: 100 CAPSULE ORAL at 21:18

## 2020-12-14 RX ADMIN — GABAPENTIN 200 MG: 100 CAPSULE ORAL at 17:32

## 2020-12-14 RX ADMIN — RANOLAZINE 500 MG: 500 TABLET, FILM COATED, EXTENDED RELEASE ORAL at 14:06

## 2020-12-14 RX ADMIN — Medication 1 TABLET: at 08:28

## 2020-12-14 RX ADMIN — GABAPENTIN 200 MG: 100 CAPSULE ORAL at 08:29

## 2020-12-14 RX ADMIN — MAGNESIUM OXIDE TAB 400 MG (241.3 MG ELEMENTAL MG) 400 MG: 400 (241.3 MG) TAB at 17:33

## 2020-12-14 RX ADMIN — METOPROLOL TARTRATE 25 MG: 25 TABLET, FILM COATED ORAL at 14:06

## 2020-12-14 RX ADMIN — FOLIC ACID 1 MG: 1 TABLET ORAL at 08:29

## 2020-12-14 RX ADMIN — OXYCODONE HYDROCHLORIDE 10 MG: 10 TABLET ORAL at 10:06

## 2020-12-14 RX ADMIN — METOROPROLOL TARTRATE 5 MG: 5 INJECTION, SOLUTION INTRAVENOUS at 10:06

## 2020-12-14 RX ADMIN — METOROPROLOL TARTRATE 5 MG: 5 INJECTION, SOLUTION INTRAVENOUS at 05:29

## 2020-12-14 RX ADMIN — FLUTICASONE FUROATE AND VILANTEROL TRIFENATATE 1 PUFF: 200; 25 POWDER RESPIRATORY (INHALATION) at 08:34

## 2020-12-14 RX ADMIN — MAGNESIUM OXIDE TAB 400 MG (241.3 MG ELEMENTAL MG) 400 MG: 400 (241.3 MG) TAB at 14:04

## 2020-12-14 RX ADMIN — OXYCODONE HYDROCHLORIDE 10 MG: 10 TABLET ORAL at 23:03

## 2020-12-14 RX ADMIN — PRAVASTATIN SODIUM 40 MG: 40 TABLET ORAL at 17:33

## 2020-12-15 ENCOUNTER — ANESTHESIA EVENT (INPATIENT)
Dept: PERIOP | Facility: HOSPITAL | Age: 60
DRG: 029 | End: 2020-12-15
Payer: MEDICARE

## 2020-12-15 LAB
ABO GROUP BLD: NORMAL
BLD GP AB SCN SERPL QL: NEGATIVE
GLUCOSE SERPL-MCNC: 108 MG/DL (ref 65–140)
GLUCOSE SERPL-MCNC: 116 MG/DL (ref 65–140)
GLUCOSE SERPL-MCNC: 62 MG/DL (ref 65–140)
GLUCOSE SERPL-MCNC: 90 MG/DL (ref 65–140)
RH BLD: POSITIVE
SPECIMEN EXPIRATION DATE: NORMAL

## 2020-12-15 PROCEDURE — 82948 REAGENT STRIP/BLOOD GLUCOSE: CPT

## 2020-12-15 PROCEDURE — 86850 RBC ANTIBODY SCREEN: CPT | Performed by: PHYSICIAN ASSISTANT

## 2020-12-15 PROCEDURE — 86923 COMPATIBILITY TEST ELECTRIC: CPT

## 2020-12-15 PROCEDURE — 86900 BLOOD TYPING SEROLOGIC ABO: CPT | Performed by: PHYSICIAN ASSISTANT

## 2020-12-15 PROCEDURE — 99232 SBSQ HOSP IP/OBS MODERATE 35: CPT | Performed by: PHYSICIAN ASSISTANT

## 2020-12-15 PROCEDURE — 99232 SBSQ HOSP IP/OBS MODERATE 35: CPT | Performed by: SURGERY

## 2020-12-15 PROCEDURE — 86901 BLOOD TYPING SEROLOGIC RH(D): CPT | Performed by: PHYSICIAN ASSISTANT

## 2020-12-15 PROCEDURE — 99232 SBSQ HOSP IP/OBS MODERATE 35: CPT | Performed by: INTERNAL MEDICINE

## 2020-12-15 RX ORDER — SODIUM CHLORIDE, SODIUM GLUCONATE, SODIUM ACETATE, POTASSIUM CHLORIDE, MAGNESIUM CHLORIDE, SODIUM PHOSPHATE, DIBASIC, AND POTASSIUM PHOSPHATE .53; .5; .37; .037; .03; .012; .00082 G/100ML; G/100ML; G/100ML; G/100ML; G/100ML; G/100ML; G/100ML
100 INJECTION, SOLUTION INTRAVENOUS CONTINUOUS
Status: DISCONTINUED | OUTPATIENT
Start: 2020-12-15 | End: 2020-12-17

## 2020-12-15 RX ORDER — AMLODIPINE BESYLATE 5 MG/1
5 TABLET ORAL DAILY
Status: DISCONTINUED | OUTPATIENT
Start: 2020-12-15 | End: 2020-12-21 | Stop reason: HOSPADM

## 2020-12-15 RX ADMIN — GABAPENTIN 200 MG: 100 CAPSULE ORAL at 17:26

## 2020-12-15 RX ADMIN — TAMSULOSIN HYDROCHLORIDE 0.4 MG: 0.4 CAPSULE ORAL at 08:16

## 2020-12-15 RX ADMIN — MAGNESIUM OXIDE TAB 400 MG (241.3 MG ELEMENTAL MG) 400 MG: 400 (241.3 MG) TAB at 17:26

## 2020-12-15 RX ADMIN — HEPARIN SODIUM 5000 UNITS: 5000 INJECTION INTRAVENOUS; SUBCUTANEOUS at 05:30

## 2020-12-15 RX ADMIN — GABAPENTIN 200 MG: 100 CAPSULE ORAL at 22:15

## 2020-12-15 RX ADMIN — GABAPENTIN 200 MG: 100 CAPSULE ORAL at 08:16

## 2020-12-15 RX ADMIN — AMLODIPINE BESYLATE 5 MG: 5 TABLET ORAL at 11:14

## 2020-12-15 RX ADMIN — METOPROLOL TARTRATE 25 MG: 25 TABLET, FILM COATED ORAL at 22:23

## 2020-12-15 RX ADMIN — RANOLAZINE 500 MG: 500 TABLET, FILM COATED, EXTENDED RELEASE ORAL at 08:17

## 2020-12-15 RX ADMIN — SENNOSIDES 17.2 MG: 8.6 TABLET ORAL at 22:15

## 2020-12-15 RX ADMIN — PRAVASTATIN SODIUM 40 MG: 40 TABLET ORAL at 17:27

## 2020-12-15 RX ADMIN — FOLIC ACID 1 MG: 1 TABLET ORAL at 08:16

## 2020-12-15 RX ADMIN — Medication 1 TABLET: at 08:16

## 2020-12-15 RX ADMIN — RANOLAZINE 500 MG: 500 TABLET, FILM COATED, EXTENDED RELEASE ORAL at 22:17

## 2020-12-15 RX ADMIN — METOPROLOL TARTRATE 25 MG: 25 TABLET, FILM COATED ORAL at 08:16

## 2020-12-15 RX ADMIN — HEPARIN SODIUM 5000 UNITS: 5000 INJECTION INTRAVENOUS; SUBCUTANEOUS at 15:06

## 2020-12-15 RX ADMIN — MAGNESIUM OXIDE TAB 400 MG (241.3 MG ELEMENTAL MG) 400 MG: 400 (241.3 MG) TAB at 08:16

## 2020-12-15 RX ADMIN — THIAMINE HCL TAB 100 MG 100 MG: 100 TAB at 08:16

## 2020-12-15 RX ADMIN — HEPARIN SODIUM 5000 UNITS: 5000 INJECTION INTRAVENOUS; SUBCUTANEOUS at 22:15

## 2020-12-16 ENCOUNTER — ANESTHESIA (INPATIENT)
Dept: PERIOP | Facility: HOSPITAL | Age: 60
DRG: 029 | End: 2020-12-16
Payer: MEDICARE

## 2020-12-16 ENCOUNTER — APPOINTMENT (INPATIENT)
Dept: RADIOLOGY | Facility: HOSPITAL | Age: 60
DRG: 029 | End: 2020-12-16
Payer: MEDICARE

## 2020-12-16 VITALS — HEART RATE: 109 BPM

## 2020-12-16 LAB
ANION GAP SERPL CALCULATED.3IONS-SCNC: 7 MMOL/L (ref 4–13)
BASOPHILS # BLD AUTO: 0.18 THOUSANDS/ΜL (ref 0–0.1)
BASOPHILS NFR BLD AUTO: 3 % (ref 0–1)
BUN SERPL-MCNC: 38 MG/DL (ref 5–25)
CALCIUM SERPL-MCNC: 9.7 MG/DL (ref 8.3–10.1)
CHLORIDE SERPL-SCNC: 108 MMOL/L (ref 100–108)
CO2 SERPL-SCNC: 26 MMOL/L (ref 21–32)
CREAT SERPL-MCNC: 1.7 MG/DL (ref 0.6–1.3)
EOSINOPHIL # BLD AUTO: 0.34 THOUSAND/ΜL (ref 0–0.61)
EOSINOPHIL NFR BLD AUTO: 5 % (ref 0–6)
ERYTHROCYTE [DISTWIDTH] IN BLOOD BY AUTOMATED COUNT: 12.6 % (ref 11.6–15.1)
GFR SERPL CREATININE-BSD FRML MDRD: 43 ML/MIN/1.73SQ M
GLUCOSE SERPL-MCNC: 109 MG/DL (ref 65–140)
GLUCOSE SERPL-MCNC: 123 MG/DL (ref 65–140)
GLUCOSE SERPL-MCNC: 158 MG/DL (ref 65–140)
GLUCOSE SERPL-MCNC: 187 MG/DL (ref 65–140)
GLUCOSE SERPL-MCNC: 95 MG/DL (ref 65–140)
HCT VFR BLD AUTO: 37.4 % (ref 36.5–49.3)
HGB BLD-MCNC: 11.6 G/DL (ref 12–17)
IMM GRANULOCYTES # BLD AUTO: 0.05 THOUSAND/UL (ref 0–0.2)
IMM GRANULOCYTES NFR BLD AUTO: 1 % (ref 0–2)
LYMPHOCYTES # BLD AUTO: 1.51 THOUSANDS/ΜL (ref 0.6–4.47)
LYMPHOCYTES NFR BLD AUTO: 21 % (ref 14–44)
MCH RBC QN AUTO: 32.3 PG (ref 26.8–34.3)
MCHC RBC AUTO-ENTMCNC: 31 G/DL (ref 31.4–37.4)
MCV RBC AUTO: 104 FL (ref 82–98)
MONOCYTES # BLD AUTO: 1.1 THOUSAND/ΜL (ref 0.17–1.22)
MONOCYTES NFR BLD AUTO: 16 % (ref 4–12)
NEUTROPHILS # BLD AUTO: 3.9 THOUSANDS/ΜL (ref 1.85–7.62)
NEUTS SEG NFR BLD AUTO: 54 % (ref 43–75)
NRBC BLD AUTO-RTO: 0 /100 WBCS
PLATELET # BLD AUTO: 253 THOUSANDS/UL (ref 149–390)
PMV BLD AUTO: 11.2 FL (ref 8.9–12.7)
POTASSIUM SERPL-SCNC: 5 MMOL/L (ref 3.5–5.3)
RBC # BLD AUTO: 3.59 MILLION/UL (ref 3.88–5.62)
SODIUM SERPL-SCNC: 141 MMOL/L (ref 136–145)
WBC # BLD AUTO: 7.08 THOUSAND/UL (ref 4.31–10.16)

## 2020-12-16 PROCEDURE — 20930 SP BONE ALGRFT MORSEL ADD-ON: CPT | Performed by: NEUROLOGICAL SURGERY

## 2020-12-16 PROCEDURE — C1713 ANCHOR/SCREW BN/BN,TIS/BN: HCPCS | Performed by: NEUROLOGICAL SURGERY

## 2020-12-16 PROCEDURE — 01N10ZZ RELEASE CERVICAL NERVE, OPEN APPROACH: ICD-10-PCS | Performed by: NEUROLOGICAL SURGERY

## 2020-12-16 PROCEDURE — 72040 X-RAY EXAM NECK SPINE 2-3 VW: CPT

## 2020-12-16 PROCEDURE — 22551 ARTHRD ANT NTRBDY CERVICAL: CPT | Performed by: NEUROLOGICAL SURGERY

## 2020-12-16 PROCEDURE — 22854 INSJ BIOMECHANICAL DEVICE: CPT | Performed by: NEUROLOGICAL SURGERY

## 2020-12-16 PROCEDURE — 22554 ARTHRD ANT NTRBD MIN DSC CRV: CPT | Performed by: NEUROLOGICAL SURGERY

## 2020-12-16 PROCEDURE — 63082 REMOVE VERTEBRAL BODY ADD-ON: CPT | Performed by: NEUROLOGICAL SURGERY

## 2020-12-16 PROCEDURE — 0RG70AJ FUSION OF 2 TO 7 THORACIC VERTEBRAL JOINTS WITH INTERBODY FUSION DEVICE, POSTERIOR APPROACH, ANTERIOR COLUMN, OPEN APPROACH: ICD-10-PCS | Performed by: NEUROLOGICAL SURGERY

## 2020-12-16 PROCEDURE — 99232 SBSQ HOSP IP/OBS MODERATE 35: CPT | Performed by: SURGERY

## 2020-12-16 PROCEDURE — 22552 ARTHRD ANT NTRBD CERVICAL EA: CPT | Performed by: NEUROLOGICAL SURGERY

## 2020-12-16 PROCEDURE — 85025 COMPLETE CBC W/AUTO DIFF WBC: CPT | Performed by: PHYSICIAN ASSISTANT

## 2020-12-16 PROCEDURE — 63081 REMOVE VERT BODY DCMPRN CRVL: CPT | Performed by: NEUROLOGICAL SURGERY

## 2020-12-16 PROCEDURE — 22614 ARTHRD PST TQ 1NTRSPC EA ADD: CPT | Performed by: NEUROLOGICAL SURGERY

## 2020-12-16 PROCEDURE — 99024 POSTOP FOLLOW-UP VISIT: CPT | Performed by: NEUROLOGICAL SURGERY

## 2020-12-16 PROCEDURE — G9197 ORDER FOR CEPH: HCPCS | Performed by: NEUROLOGICAL SURGERY

## 2020-12-16 PROCEDURE — 80048 BASIC METABOLIC PNL TOTAL CA: CPT | Performed by: PHYSICIAN ASSISTANT

## 2020-12-16 PROCEDURE — 22600 ARTHRD PST TQ 1NTRSPC CRV: CPT | Performed by: NEUROLOGICAL SURGERY

## 2020-12-16 PROCEDURE — 20936 SP BONE AGRFT LOCAL ADD-ON: CPT | Performed by: NEUROLOGICAL SURGERY

## 2020-12-16 PROCEDURE — 22843 INSERT SPINE FIXATION DEVICE: CPT | Performed by: NEUROLOGICAL SURGERY

## 2020-12-16 PROCEDURE — 0PB30ZZ EXCISION OF CERVICAL VERTEBRA, OPEN APPROACH: ICD-10-PCS | Performed by: NEUROLOGICAL SURGERY

## 2020-12-16 PROCEDURE — 22853 INSJ BIOMECHANICAL DEVICE: CPT | Performed by: NEUROLOGICAL SURGERY

## 2020-12-16 PROCEDURE — 82948 REAGENT STRIP/BLOOD GLUCOSE: CPT

## 2020-12-16 PROCEDURE — 63015 REMOVE SPINE LAMINA >2 CRVCL: CPT | Performed by: NEUROLOGICAL SURGERY

## 2020-12-16 PROCEDURE — 0RG20AJ FUSION OF 2 OR MORE CERVICAL VERTEBRAL JOINTS WITH INTERBODY FUSION DEVICE, POSTERIOR APPROACH, ANTERIOR COLUMN, OPEN APPROACH: ICD-10-PCS | Performed by: NEUROLOGICAL SURGERY

## 2020-12-16 DEVICE — IMPLANTABLE DEVICE: Type: IMPLANTABLE DEVICE | Site: POSTERIOR CERVICAL | Status: FUNCTIONAL

## 2020-12-16 DEVICE — CERVICAL CAGE
Type: IMPLANTABLE DEVICE | Site: SPINE CERVICAL | Status: FUNCTIONAL
Brand: AVS ANCHOR-C

## 2020-12-16 DEVICE — BIOACTIVE BONE GRAFT SUBSTITUTE, FOAM PACK; BETA-TRICALCIUM PHOSPHATE, TYPE I BOVINE COLLAGEN, AND BIOACTIVE GLASS
Type: IMPLANTABLE DEVICE | Site: POSTERIOR CERVICAL | Status: FUNCTIONAL
Brand: VITOSS BA

## 2020-12-16 DEVICE — BONE GRAFT SUBSTITUTE, FOAM PACK, BETA-TRICALCIUM PHOSPHATE AND TYPE I BOVINE COLLAGEN
Type: IMPLANTABLE DEVICE | Site: SPINE CERVICAL | Status: FUNCTIONAL
Brand: VITOSS

## 2020-12-16 DEVICE — SCREW SET YUKON: Type: IMPLANTABLE DEVICE | Site: POSTERIOR CERVICAL | Status: FUNCTIONAL

## 2020-12-16 DEVICE — SELF-DRILLING SCREW
Type: IMPLANTABLE DEVICE | Site: SPINE CERVICAL | Status: FUNCTIONAL
Brand: AVS ANCHOR-C

## 2020-12-16 DEVICE — SELF-TAPPING SCREW
Type: IMPLANTABLE DEVICE | Site: SPINE CERVICAL | Status: FUNCTIONAL
Brand: AVS ANCHOR-C

## 2020-12-16 RX ORDER — FENTANYL CITRATE 50 UG/ML
INJECTION, SOLUTION INTRAMUSCULAR; INTRAVENOUS AS NEEDED
Status: DISCONTINUED | OUTPATIENT
Start: 2020-12-16 | End: 2020-12-16

## 2020-12-16 RX ORDER — HEPARIN SODIUM 5000 [USP'U]/ML
5000 INJECTION, SOLUTION INTRAVENOUS; SUBCUTANEOUS EVERY 8 HOURS SCHEDULED
Status: DISCONTINUED | OUTPATIENT
Start: 2020-12-17 | End: 2020-12-20

## 2020-12-16 RX ORDER — METOPROLOL TARTRATE 5 MG/5ML
INJECTION INTRAVENOUS AS NEEDED
Status: DISCONTINUED | OUTPATIENT
Start: 2020-12-16 | End: 2020-12-16

## 2020-12-16 RX ORDER — ONDANSETRON 2 MG/ML
4 INJECTION INTRAMUSCULAR; INTRAVENOUS ONCE AS NEEDED
Status: DISCONTINUED | OUTPATIENT
Start: 2020-12-16 | End: 2020-12-16 | Stop reason: HOSPADM

## 2020-12-16 RX ORDER — PROPOFOL 10 MG/ML
INJECTION, EMULSION INTRAVENOUS CONTINUOUS PRN
Status: DISCONTINUED | OUTPATIENT
Start: 2020-12-16 | End: 2020-12-16

## 2020-12-16 RX ORDER — CEFAZOLIN SODIUM 2 G/50ML
2000 SOLUTION INTRAVENOUS ONCE
Status: COMPLETED | OUTPATIENT
Start: 2020-12-16 | End: 2020-12-16

## 2020-12-16 RX ORDER — SODIUM CHLORIDE, SODIUM LACTATE, POTASSIUM CHLORIDE, CALCIUM CHLORIDE 600; 310; 30; 20 MG/100ML; MG/100ML; MG/100ML; MG/100ML
INJECTION, SOLUTION INTRAVENOUS CONTINUOUS PRN
Status: DISCONTINUED | OUTPATIENT
Start: 2020-12-16 | End: 2020-12-16

## 2020-12-16 RX ORDER — ACETAMINOPHEN 325 MG/1
975 TABLET ORAL EVERY 8 HOURS SCHEDULED
Status: DISCONTINUED | OUTPATIENT
Start: 2020-12-16 | End: 2020-12-21 | Stop reason: HOSPADM

## 2020-12-16 RX ORDER — METHOCARBAMOL 750 MG/1
750 TABLET, FILM COATED ORAL 4 TIMES DAILY PRN
Status: DISCONTINUED | OUTPATIENT
Start: 2020-12-16 | End: 2020-12-21 | Stop reason: HOSPADM

## 2020-12-16 RX ORDER — HYDROMORPHONE HCL/PF 1 MG/ML
0.5 SYRINGE (ML) INJECTION
Status: COMPLETED | OUTPATIENT
Start: 2020-12-16 | End: 2020-12-16

## 2020-12-16 RX ORDER — SUCCINYLCHOLINE/SOD CL,ISO/PF 100 MG/5ML
SYRINGE (ML) INTRAVENOUS AS NEEDED
Status: DISCONTINUED | OUTPATIENT
Start: 2020-12-16 | End: 2020-12-16

## 2020-12-16 RX ORDER — SODIUM CHLORIDE 9 MG/ML
INJECTION, SOLUTION INTRAVENOUS CONTINUOUS PRN
Status: DISCONTINUED | OUTPATIENT
Start: 2020-12-16 | End: 2020-12-16

## 2020-12-16 RX ORDER — MIDAZOLAM HYDROCHLORIDE 2 MG/2ML
INJECTION, SOLUTION INTRAMUSCULAR; INTRAVENOUS AS NEEDED
Status: DISCONTINUED | OUTPATIENT
Start: 2020-12-16 | End: 2020-12-16

## 2020-12-16 RX ORDER — LIDOCAINE HYDROCHLORIDE AND EPINEPHRINE 10; 10 MG/ML; UG/ML
INJECTION, SOLUTION INFILTRATION; PERINEURAL AS NEEDED
Status: DISCONTINUED | OUTPATIENT
Start: 2020-12-16 | End: 2020-12-16 | Stop reason: HOSPADM

## 2020-12-16 RX ORDER — LIDOCAINE HYDROCHLORIDE 10 MG/ML
0.5 INJECTION, SOLUTION EPIDURAL; INFILTRATION; INTRACAUDAL; PERINEURAL ONCE AS NEEDED
Status: DISCONTINUED | OUTPATIENT
Start: 2020-12-16 | End: 2020-12-16 | Stop reason: HOSPADM

## 2020-12-16 RX ORDER — ONDANSETRON 2 MG/ML
4 INJECTION INTRAMUSCULAR; INTRAVENOUS EVERY 6 HOURS PRN
Status: DISCONTINUED | OUTPATIENT
Start: 2020-12-16 | End: 2020-12-21 | Stop reason: HOSPADM

## 2020-12-16 RX ORDER — PROMETHAZINE HYDROCHLORIDE 25 MG/ML
12.5 INJECTION, SOLUTION INTRAMUSCULAR; INTRAVENOUS ONCE AS NEEDED
Status: DISCONTINUED | OUTPATIENT
Start: 2020-12-16 | End: 2020-12-16 | Stop reason: HOSPADM

## 2020-12-16 RX ORDER — PROPOFOL 10 MG/ML
INJECTION, EMULSION INTRAVENOUS AS NEEDED
Status: DISCONTINUED | OUTPATIENT
Start: 2020-12-16 | End: 2020-12-16

## 2020-12-16 RX ORDER — DEXAMETHASONE SODIUM PHOSPHATE 10 MG/ML
INJECTION, SOLUTION INTRAMUSCULAR; INTRAVENOUS AS NEEDED
Status: DISCONTINUED | OUTPATIENT
Start: 2020-12-16 | End: 2020-12-16

## 2020-12-16 RX ORDER — LABETALOL 20 MG/4 ML (5 MG/ML) INTRAVENOUS SYRINGE
AS NEEDED
Status: DISCONTINUED | OUTPATIENT
Start: 2020-12-16 | End: 2020-12-16

## 2020-12-16 RX ORDER — ONDANSETRON 2 MG/ML
INJECTION INTRAMUSCULAR; INTRAVENOUS AS NEEDED
Status: DISCONTINUED | OUTPATIENT
Start: 2020-12-16 | End: 2020-12-16

## 2020-12-16 RX ORDER — SODIUM CHLORIDE 9 MG/ML
125 INJECTION, SOLUTION INTRAVENOUS CONTINUOUS
Status: DISCONTINUED | OUTPATIENT
Start: 2020-12-16 | End: 2020-12-17

## 2020-12-16 RX ORDER — SODIUM CHLORIDE, SODIUM LACTATE, POTASSIUM CHLORIDE, CALCIUM CHLORIDE 600; 310; 30; 20 MG/100ML; MG/100ML; MG/100ML; MG/100ML
125 INJECTION, SOLUTION INTRAVENOUS CONTINUOUS
Status: DISCONTINUED | OUTPATIENT
Start: 2020-12-16 | End: 2020-12-16

## 2020-12-16 RX ORDER — CALCIUM CARBONATE 200(500)MG
1000 TABLET,CHEWABLE ORAL DAILY PRN
Status: DISCONTINUED | OUTPATIENT
Start: 2020-12-16 | End: 2020-12-21 | Stop reason: HOSPADM

## 2020-12-16 RX ORDER — KETAMINE HYDROCHLORIDE 50 MG/ML
INJECTION, SOLUTION, CONCENTRATE INTRAMUSCULAR; INTRAVENOUS AS NEEDED
Status: DISCONTINUED | OUTPATIENT
Start: 2020-12-16 | End: 2020-12-16

## 2020-12-16 RX ORDER — SODIUM CHLORIDE 9 MG/ML
100 INJECTION, SOLUTION INTRAVENOUS CONTINUOUS
Status: DISCONTINUED | OUTPATIENT
Start: 2020-12-16 | End: 2020-12-16

## 2020-12-16 RX ORDER — VANCOMYCIN HYDROCHLORIDE 1 G/20ML
INJECTION, POWDER, LYOPHILIZED, FOR SOLUTION INTRAVENOUS AS NEEDED
Status: DISCONTINUED | OUTPATIENT
Start: 2020-12-16 | End: 2020-12-16 | Stop reason: HOSPADM

## 2020-12-16 RX ORDER — CHLORHEXIDINE GLUCONATE 0.12 MG/ML
15 RINSE ORAL ONCE
Status: COMPLETED | OUTPATIENT
Start: 2020-12-16 | End: 2020-12-16

## 2020-12-16 RX ORDER — CEPHALEXIN 500 MG/1
500 CAPSULE ORAL EVERY 6 HOURS SCHEDULED
Status: DISCONTINUED | OUTPATIENT
Start: 2020-12-17 | End: 2020-12-21

## 2020-12-16 RX ORDER — CEFAZOLIN SODIUM 1 G/50ML
1000 SOLUTION INTRAVENOUS EVERY 8 HOURS
Status: DISPENSED | OUTPATIENT
Start: 2020-12-16 | End: 2020-12-17

## 2020-12-16 RX ORDER — DEXAMETHASONE 2 MG/1
2 TABLET ORAL EVERY 12 HOURS SCHEDULED
Status: COMPLETED | OUTPATIENT
Start: 2020-12-16 | End: 2020-12-19

## 2020-12-16 RX ORDER — ALBUMIN, HUMAN INJ 5% 5 %
SOLUTION INTRAVENOUS CONTINUOUS PRN
Status: DISCONTINUED | OUTPATIENT
Start: 2020-12-16 | End: 2020-12-16

## 2020-12-16 RX ORDER — EPHEDRINE SULFATE 50 MG/ML
INJECTION INTRAVENOUS AS NEEDED
Status: DISCONTINUED | OUTPATIENT
Start: 2020-12-16 | End: 2020-12-16

## 2020-12-16 RX ORDER — HYDROMORPHONE HCL/PF 1 MG/ML
SYRINGE (ML) INJECTION AS NEEDED
Status: DISCONTINUED | OUTPATIENT
Start: 2020-12-16 | End: 2020-12-16

## 2020-12-16 RX ADMIN — PHENYLEPHRINE HYDROCHLORIDE 100 MCG: 10 INJECTION INTRAVENOUS at 11:30

## 2020-12-16 RX ADMIN — SODIUM CHLORIDE, SODIUM GLUCONATE, SODIUM ACETATE, POTASSIUM CHLORIDE, MAGNESIUM CHLORIDE, SODIUM PHOSPHATE, DIBASIC, AND POTASSIUM PHOSPHATE 100 ML/HR: .53; .5; .37; .037; .03; .012; .00082 INJECTION, SOLUTION INTRAVENOUS at 08:51

## 2020-12-16 RX ADMIN — FENTANYL CITRATE 50 MCG: 50 INJECTION INTRAMUSCULAR; INTRAVENOUS at 11:20

## 2020-12-16 RX ADMIN — METHOCARBAMOL TABLETS 750 MG: 750 TABLET, COATED ORAL at 17:56

## 2020-12-16 RX ADMIN — SODIUM CHLORIDE: 0.9 INJECTION, SOLUTION INTRAVENOUS at 15:15

## 2020-12-16 RX ADMIN — EPHEDRINE SULFATE 10 MG: 50 INJECTION, SOLUTION INTRAVENOUS at 11:27

## 2020-12-16 RX ADMIN — INSULIN LISPRO 1 UNITS: 100 INJECTION, SOLUTION INTRAVENOUS; SUBCUTANEOUS at 21:55

## 2020-12-16 RX ADMIN — FLUTICASONE FUROATE AND VILANTEROL TRIFENATATE 1 PUFF: 200; 25 POWDER RESPIRATORY (INHALATION) at 08:47

## 2020-12-16 RX ADMIN — MIDAZOLAM 2 MG: 1 INJECTION INTRAMUSCULAR; INTRAVENOUS at 11:00

## 2020-12-16 RX ADMIN — EPHEDRINE SULFATE 10 MG: 50 INJECTION, SOLUTION INTRAVENOUS at 11:36

## 2020-12-16 RX ADMIN — RANOLAZINE 500 MG: 500 TABLET, FILM COATED, EXTENDED RELEASE ORAL at 08:46

## 2020-12-16 RX ADMIN — EPHEDRINE SULFATE 10 MG: 50 INJECTION, SOLUTION INTRAVENOUS at 11:35

## 2020-12-16 RX ADMIN — AMLODIPINE BESYLATE 5 MG: 5 TABLET ORAL at 08:45

## 2020-12-16 RX ADMIN — GABAPENTIN 200 MG: 100 CAPSULE ORAL at 08:45

## 2020-12-16 RX ADMIN — HYDROMORPHONE HYDROCHLORIDE 0.5 MG: 1 INJECTION, SOLUTION INTRAMUSCULAR; INTRAVENOUS; SUBCUTANEOUS at 13:40

## 2020-12-16 RX ADMIN — EPHEDRINE SULFATE 10 MG: 50 INJECTION, SOLUTION INTRAVENOUS at 11:14

## 2020-12-16 RX ADMIN — SODIUM CHLORIDE 125 ML/HR: 0.9 INJECTION, SOLUTION INTRAVENOUS at 16:52

## 2020-12-16 RX ADMIN — HYDROMORPHONE HYDROCHLORIDE 0.5 MG: 1 INJECTION, SOLUTION INTRAMUSCULAR; INTRAVENOUS; SUBCUTANEOUS at 12:58

## 2020-12-16 RX ADMIN — SODIUM CHLORIDE: 0.9 INJECTION, SOLUTION INTRAVENOUS at 16:11

## 2020-12-16 RX ADMIN — PHENYLEPHRINE HYDROCHLORIDE 100 MCG: 10 INJECTION INTRAVENOUS at 11:48

## 2020-12-16 RX ADMIN — PROPOFOL 100 MG: 10 INJECTION, EMULSION INTRAVENOUS at 13:46

## 2020-12-16 RX ADMIN — ALBUMIN (HUMAN): 12.5 INJECTION, SOLUTION INTRAVENOUS at 11:45

## 2020-12-16 RX ADMIN — SODIUM CHLORIDE 0.2 MCG/KG/HR: 9 INJECTION, SOLUTION INTRAVENOUS at 11:34

## 2020-12-16 RX ADMIN — SODIUM CHLORIDE, SODIUM LACTATE, POTASSIUM CHLORIDE, AND CALCIUM CHLORIDE: .6; .31; .03; .02 INJECTION, SOLUTION INTRAVENOUS at 11:03

## 2020-12-16 RX ADMIN — ONDANSETRON 4 MG: 2 INJECTION INTRAMUSCULAR; INTRAVENOUS at 16:08

## 2020-12-16 RX ADMIN — METOPROLOL TARTRATE 25 MG: 25 TABLET, FILM COATED ORAL at 08:44

## 2020-12-16 RX ADMIN — PROPOFOL 150 MG: 10 INJECTION, EMULSION INTRAVENOUS at 11:13

## 2020-12-16 RX ADMIN — HYDROMORPHONE HYDROCHLORIDE 0.5 MG: 1 INJECTION, SOLUTION INTRAMUSCULAR; INTRAVENOUS; SUBCUTANEOUS at 12:06

## 2020-12-16 RX ADMIN — DEXAMETHASONE SODIUM PHOSPHATE 10 MG: 10 INJECTION, SOLUTION INTRAMUSCULAR; INTRAVENOUS at 11:45

## 2020-12-16 RX ADMIN — METOROPROLOL TARTRATE 2.5 MG: 5 INJECTION, SOLUTION INTRAVENOUS at 13:28

## 2020-12-16 RX ADMIN — SENNOSIDES 17.2 MG: 8.6 TABLET ORAL at 21:47

## 2020-12-16 RX ADMIN — METOPROLOL TARTRATE 25 MG: 25 TABLET, FILM COATED ORAL at 20:11

## 2020-12-16 RX ADMIN — OXYCODONE HYDROCHLORIDE 10 MG: 10 TABLET ORAL at 06:36

## 2020-12-16 RX ADMIN — POLYETHYLENE GLYCOL 3350 17 G: 17 POWDER, FOR SOLUTION ORAL at 21:47

## 2020-12-16 RX ADMIN — HYDROMORPHONE HYDROCHLORIDE 0.5 MG: 1 INJECTION, SOLUTION INTRAMUSCULAR; INTRAVENOUS; SUBCUTANEOUS at 16:51

## 2020-12-16 RX ADMIN — TAMSULOSIN HYDROCHLORIDE 0.4 MG: 0.4 CAPSULE ORAL at 08:45

## 2020-12-16 RX ADMIN — PHENYLEPHRINE HYDROCHLORIDE 100 MCG: 10 INJECTION INTRAVENOUS at 12:18

## 2020-12-16 RX ADMIN — LABETALOL 20 MG/4 ML (5 MG/ML) INTRAVENOUS SYRINGE 10 MG: at 14:22

## 2020-12-16 RX ADMIN — HYDROMORPHONE HYDROCHLORIDE 0.5 MG: 1 INJECTION, SOLUTION INTRAMUSCULAR; INTRAVENOUS; SUBCUTANEOUS at 17:02

## 2020-12-16 RX ADMIN — DEXAMETHASONE 2 MG: 2 TABLET ORAL at 20:11

## 2020-12-16 RX ADMIN — REMIFENTANIL HYDROCHLORIDE 0.15 MCG/KG/MIN: 1 INJECTION, POWDER, LYOPHILIZED, FOR SOLUTION INTRAVENOUS at 11:34

## 2020-12-16 RX ADMIN — OXYCODONE HYDROCHLORIDE 10 MG: 10 TABLET ORAL at 17:55

## 2020-12-16 RX ADMIN — THIAMINE HCL TAB 100 MG 100 MG: 100 TAB at 08:45

## 2020-12-16 RX ADMIN — FOLIC ACID 1 MG: 1 TABLET ORAL at 08:45

## 2020-12-16 RX ADMIN — HYDROMORPHONE HYDROCHLORIDE 0.5 MG: 1 INJECTION, SOLUTION INTRAMUSCULAR; INTRAVENOUS; SUBCUTANEOUS at 16:44

## 2020-12-16 RX ADMIN — HYDROMORPHONE HYDROCHLORIDE 0.5 MG: 1 INJECTION, SOLUTION INTRAMUSCULAR; INTRAVENOUS; SUBCUTANEOUS at 13:20

## 2020-12-16 RX ADMIN — GABAPENTIN 200 MG: 100 CAPSULE ORAL at 20:10

## 2020-12-16 RX ADMIN — PHENYLEPHRINE HYDROCHLORIDE 100 MCG: 10 INJECTION INTRAVENOUS at 16:10

## 2020-12-16 RX ADMIN — SODIUM CHLORIDE, SODIUM LACTATE, POTASSIUM CHLORIDE, AND CALCIUM CHLORIDE: .6; .31; .03; .02 INJECTION, SOLUTION INTRAVENOUS at 15:47

## 2020-12-16 RX ADMIN — PROPOFOL 100 MCG/KG/MIN: 10 INJECTION, EMULSION INTRAVENOUS at 11:34

## 2020-12-16 RX ADMIN — KETAMINE HYDROCHLORIDE 50 MG: 50 INJECTION, SOLUTION INTRAMUSCULAR; INTRAVENOUS at 14:17

## 2020-12-16 RX ADMIN — ACETAMINOPHEN 975 MG: 325 TABLET, FILM COATED ORAL at 21:47

## 2020-12-16 RX ADMIN — PHENYLEPHRINE HYDROCHLORIDE 100 MCG: 10 INJECTION INTRAVENOUS at 12:32

## 2020-12-16 RX ADMIN — CEFAZOLIN SODIUM 2000 MG: 2 SOLUTION INTRAVENOUS at 15:29

## 2020-12-16 RX ADMIN — ACETAMINOPHEN 650 MG: 325 TABLET, FILM COATED ORAL at 06:37

## 2020-12-16 RX ADMIN — SODIUM CHLORIDE, SODIUM GLUCONATE, SODIUM ACETATE, POTASSIUM CHLORIDE, MAGNESIUM CHLORIDE, SODIUM PHOSPHATE, DIBASIC, AND POTASSIUM PHOSPHATE 75 ML/HR: .53; .5; .37; .037; .03; .012; .00082 INJECTION, SOLUTION INTRAVENOUS at 00:14

## 2020-12-16 RX ADMIN — PHENYLEPHRINE HYDROCHLORIDE 100 MCG: 10 INJECTION INTRAVENOUS at 11:58

## 2020-12-16 RX ADMIN — EPHEDRINE SULFATE 10 MG: 50 INJECTION, SOLUTION INTRAVENOUS at 11:48

## 2020-12-16 RX ADMIN — PHENYLEPHRINE HYDROCHLORIDE 20 MCG/MIN: 10 INJECTION INTRAVENOUS at 12:32

## 2020-12-16 RX ADMIN — RANOLAZINE 500 MG: 500 TABLET, FILM COATED, EXTENDED RELEASE ORAL at 20:14

## 2020-12-16 RX ADMIN — HYDROMORPHONE HYDROCHLORIDE 0.5 MG: 1 INJECTION, SOLUTION INTRAMUSCULAR; INTRAVENOUS; SUBCUTANEOUS at 20:11

## 2020-12-16 RX ADMIN — KETAMINE HYDROCHLORIDE 20 MG: 50 INJECTION, SOLUTION INTRAMUSCULAR; INTRAVENOUS at 11:14

## 2020-12-16 RX ADMIN — FENTANYL CITRATE 50 MCG: 50 INJECTION INTRAMUSCULAR; INTRAVENOUS at 11:13

## 2020-12-16 RX ADMIN — PHENYLEPHRINE HYDROCHLORIDE 100 MCG: 10 INJECTION INTRAVENOUS at 16:16

## 2020-12-16 RX ADMIN — MAGNESIUM OXIDE TAB 400 MG (241.3 MG ELEMENTAL MG) 400 MG: 400 (241.3 MG) TAB at 08:44

## 2020-12-16 RX ADMIN — HYDROMORPHONE HYDROCHLORIDE 0.5 MG: 1 INJECTION, SOLUTION INTRAMUSCULAR; INTRAVENOUS; SUBCUTANEOUS at 17:10

## 2020-12-16 RX ADMIN — DOCUSATE SODIUM 100 MG: 100 CAPSULE ORAL at 17:56

## 2020-12-16 RX ADMIN — SODIUM CHLORIDE: 0.9 INJECTION, SOLUTION INTRAVENOUS at 11:32

## 2020-12-16 RX ADMIN — HYDROMORPHONE HYDROCHLORIDE 0.5 MG: 1 INJECTION, SOLUTION INTRAMUSCULAR; INTRAVENOUS; SUBCUTANEOUS at 16:45

## 2020-12-16 RX ADMIN — OXYCODONE HYDROCHLORIDE 10 MG: 10 TABLET ORAL at 21:58

## 2020-12-16 RX ADMIN — ALBUMIN (HUMAN): 12.5 INJECTION, SOLUTION INTRAVENOUS at 11:59

## 2020-12-16 RX ADMIN — CHLORHEXIDINE GLUCONATE 0.12% ORAL RINSE 15 ML: 1.2 LIQUID ORAL at 06:29

## 2020-12-16 RX ADMIN — MAGNESIUM OXIDE TAB 400 MG (241.3 MG ELEMENTAL MG) 400 MG: 400 (241.3 MG) TAB at 17:56

## 2020-12-16 RX ADMIN — LIDOCAINE HYDROCHLORIDE 100 MG: 20 INJECTION, SOLUTION INTRAVENOUS at 11:12

## 2020-12-16 RX ADMIN — METOROPROLOL TARTRATE 2.5 MG: 5 INJECTION, SOLUTION INTRAVENOUS at 13:35

## 2020-12-16 RX ADMIN — Medication 100 MG: at 11:15

## 2020-12-16 RX ADMIN — CEFAZOLIN SODIUM 2000 MG: 2 SOLUTION INTRAVENOUS at 11:35

## 2020-12-16 RX ADMIN — KETAMINE HYDROCHLORIDE 30 MG: 50 INJECTION, SOLUTION INTRAMUSCULAR; INTRAVENOUS at 11:20

## 2020-12-16 RX ADMIN — PHENYLEPHRINE HYDROCHLORIDE 100 MCG: 10 INJECTION INTRAVENOUS at 15:57

## 2020-12-16 RX ADMIN — Medication 1 TABLET: at 08:44

## 2020-12-17 ENCOUNTER — APPOINTMENT (INPATIENT)
Dept: RADIOLOGY | Facility: HOSPITAL | Age: 60
DRG: 029 | End: 2020-12-17
Payer: MEDICARE

## 2020-12-17 LAB
ANION GAP SERPL CALCULATED.3IONS-SCNC: 7 MMOL/L (ref 4–13)
BASOPHILS # BLD AUTO: 0.03 THOUSANDS/ΜL (ref 0–0.1)
BASOPHILS NFR BLD AUTO: 0 % (ref 0–1)
BUN SERPL-MCNC: 31 MG/DL (ref 5–25)
CALCIUM SERPL-MCNC: 9.1 MG/DL (ref 8.3–10.1)
CHLORIDE SERPL-SCNC: 108 MMOL/L (ref 100–108)
CO2 SERPL-SCNC: 25 MMOL/L (ref 21–32)
CREAT SERPL-MCNC: 1.5 MG/DL (ref 0.6–1.3)
EOSINOPHIL # BLD AUTO: 0 THOUSAND/ΜL (ref 0–0.61)
EOSINOPHIL NFR BLD AUTO: 0 % (ref 0–6)
ERYTHROCYTE [DISTWIDTH] IN BLOOD BY AUTOMATED COUNT: 12.7 % (ref 11.6–15.1)
GFR SERPL CREATININE-BSD FRML MDRD: 51 ML/MIN/1.73SQ M
GLUCOSE SERPL-MCNC: 115 MG/DL (ref 65–140)
GLUCOSE SERPL-MCNC: 153 MG/DL (ref 65–140)
GLUCOSE SERPL-MCNC: 154 MG/DL (ref 65–140)
GLUCOSE SERPL-MCNC: 159 MG/DL (ref 65–140)
GLUCOSE SERPL-MCNC: 208 MG/DL (ref 65–140)
HCT VFR BLD AUTO: 32.7 % (ref 36.5–49.3)
HGB BLD-MCNC: 10.6 G/DL (ref 12–17)
IMM GRANULOCYTES # BLD AUTO: 0.08 THOUSAND/UL (ref 0–0.2)
IMM GRANULOCYTES NFR BLD AUTO: 1 % (ref 0–2)
INR PPP: 1 (ref 0.84–1.19)
LYMPHOCYTES # BLD AUTO: 0.68 THOUSANDS/ΜL (ref 0.6–4.47)
LYMPHOCYTES NFR BLD AUTO: 5 % (ref 14–44)
MCH RBC QN AUTO: 33.7 PG (ref 26.8–34.3)
MCHC RBC AUTO-ENTMCNC: 32.4 G/DL (ref 31.4–37.4)
MCV RBC AUTO: 104 FL (ref 82–98)
MONOCYTES # BLD AUTO: 0.91 THOUSAND/ΜL (ref 0.17–1.22)
MONOCYTES NFR BLD AUTO: 7 % (ref 4–12)
NEUTROPHILS # BLD AUTO: 11.67 THOUSANDS/ΜL (ref 1.85–7.62)
NEUTS SEG NFR BLD AUTO: 87 % (ref 43–75)
NRBC BLD AUTO-RTO: 0 /100 WBCS
PLATELET # BLD AUTO: 214 THOUSANDS/UL (ref 149–390)
PMV BLD AUTO: 11.5 FL (ref 8.9–12.7)
POTASSIUM SERPL-SCNC: 4.6 MMOL/L (ref 3.5–5.3)
PROTHROMBIN TIME: 13.2 SECONDS (ref 11.6–14.5)
RBC # BLD AUTO: 3.15 MILLION/UL (ref 3.88–5.62)
SODIUM SERPL-SCNC: 140 MMOL/L (ref 136–145)
WBC # BLD AUTO: 13.37 THOUSAND/UL (ref 4.31–10.16)

## 2020-12-17 PROCEDURE — 99232 SBSQ HOSP IP/OBS MODERATE 35: CPT | Performed by: INTERNAL MEDICINE

## 2020-12-17 PROCEDURE — 72125 CT NECK SPINE W/O DYE: CPT

## 2020-12-17 PROCEDURE — 72040 X-RAY EXAM NECK SPINE 2-3 VW: CPT

## 2020-12-17 PROCEDURE — G1004 CDSM NDSC: HCPCS

## 2020-12-17 PROCEDURE — 97168 OT RE-EVAL EST PLAN CARE: CPT

## 2020-12-17 PROCEDURE — 80048 BASIC METABOLIC PNL TOTAL CA: CPT | Performed by: PHYSICIAN ASSISTANT

## 2020-12-17 PROCEDURE — 85025 COMPLETE CBC W/AUTO DIFF WBC: CPT | Performed by: PHYSICIAN ASSISTANT

## 2020-12-17 PROCEDURE — 99024 POSTOP FOLLOW-UP VISIT: CPT | Performed by: PHYSICIAN ASSISTANT

## 2020-12-17 PROCEDURE — 99232 SBSQ HOSP IP/OBS MODERATE 35: CPT | Performed by: SURGERY

## 2020-12-17 PROCEDURE — 85610 PROTHROMBIN TIME: CPT | Performed by: PHYSICIAN ASSISTANT

## 2020-12-17 PROCEDURE — 97164 PT RE-EVAL EST PLAN CARE: CPT

## 2020-12-17 PROCEDURE — 82948 REAGENT STRIP/BLOOD GLUCOSE: CPT

## 2020-12-17 RX ORDER — AMLODIPINE BESYLATE 5 MG/1
5 TABLET ORAL DAILY
Status: DISCONTINUED | OUTPATIENT
Start: 2020-12-17 | End: 2020-12-17

## 2020-12-17 RX ADMIN — GABAPENTIN 200 MG: 100 CAPSULE ORAL at 22:04

## 2020-12-17 RX ADMIN — CEFAZOLIN SODIUM 1000 MG: 1 SOLUTION INTRAVENOUS at 00:45

## 2020-12-17 RX ADMIN — MAGNESIUM OXIDE TAB 400 MG (241.3 MG ELEMENTAL MG) 400 MG: 400 (241.3 MG) TAB at 17:22

## 2020-12-17 RX ADMIN — INSULIN LISPRO 1 UNITS: 100 INJECTION, SOLUTION INTRAVENOUS; SUBCUTANEOUS at 18:05

## 2020-12-17 RX ADMIN — DOCUSATE SODIUM 100 MG: 100 CAPSULE ORAL at 17:22

## 2020-12-17 RX ADMIN — CEPHALEXIN 500 MG: 500 CAPSULE ORAL at 17:22

## 2020-12-17 RX ADMIN — ACETAMINOPHEN 975 MG: 325 TABLET, FILM COATED ORAL at 05:19

## 2020-12-17 RX ADMIN — HYDROMORPHONE HYDROCHLORIDE 0.5 MG: 1 INJECTION, SOLUTION INTRAMUSCULAR; INTRAVENOUS; SUBCUTANEOUS at 12:05

## 2020-12-17 RX ADMIN — TAMSULOSIN HYDROCHLORIDE 0.4 MG: 0.4 CAPSULE ORAL at 08:58

## 2020-12-17 RX ADMIN — OXYCODONE HYDROCHLORIDE 10 MG: 10 TABLET ORAL at 05:19

## 2020-12-17 RX ADMIN — OXYCODONE HYDROCHLORIDE 10 MG: 10 TABLET ORAL at 14:47

## 2020-12-17 RX ADMIN — RANOLAZINE 500 MG: 500 TABLET, FILM COATED, EXTENDED RELEASE ORAL at 08:58

## 2020-12-17 RX ADMIN — MAGNESIUM OXIDE TAB 400 MG (241.3 MG ELEMENTAL MG) 400 MG: 400 (241.3 MG) TAB at 08:58

## 2020-12-17 RX ADMIN — ACETAMINOPHEN 975 MG: 325 TABLET, FILM COATED ORAL at 15:22

## 2020-12-17 RX ADMIN — METOPROLOL TARTRATE 25 MG: 25 TABLET, FILM COATED ORAL at 08:58

## 2020-12-17 RX ADMIN — SENNOSIDES 17.2 MG: 8.6 TABLET ORAL at 22:06

## 2020-12-17 RX ADMIN — HYDROMORPHONE HYDROCHLORIDE 0.5 MG: 1 INJECTION, SOLUTION INTRAMUSCULAR; INTRAVENOUS; SUBCUTANEOUS at 18:40

## 2020-12-17 RX ADMIN — SODIUM CHLORIDE 125 ML/HR: 0.9 INJECTION, SOLUTION INTRAVENOUS at 00:44

## 2020-12-17 RX ADMIN — FLUTICASONE FUROATE AND VILANTEROL TRIFENATATE 1 PUFF: 200; 25 POWDER RESPIRATORY (INHALATION) at 08:58

## 2020-12-17 RX ADMIN — THIAMINE HCL TAB 100 MG 100 MG: 100 TAB at 09:03

## 2020-12-17 RX ADMIN — INSULIN LISPRO 1 UNITS: 100 INJECTION, SOLUTION INTRAVENOUS; SUBCUTANEOUS at 12:23

## 2020-12-17 RX ADMIN — INSULIN LISPRO 1 UNITS: 100 INJECTION, SOLUTION INTRAVENOUS; SUBCUTANEOUS at 22:07

## 2020-12-17 RX ADMIN — Medication 1 TABLET: at 08:58

## 2020-12-17 RX ADMIN — PRAVASTATIN SODIUM 40 MG: 40 TABLET ORAL at 17:22

## 2020-12-17 RX ADMIN — AMLODIPINE BESYLATE 5 MG: 5 TABLET ORAL at 08:58

## 2020-12-17 RX ADMIN — HEPARIN SODIUM 5000 UNITS: 5000 INJECTION INTRAVENOUS; SUBCUTANEOUS at 22:06

## 2020-12-17 RX ADMIN — GABAPENTIN 200 MG: 100 CAPSULE ORAL at 08:58

## 2020-12-17 RX ADMIN — OXYCODONE HYDROCHLORIDE 10 MG: 10 TABLET ORAL at 10:12

## 2020-12-17 RX ADMIN — DEXAMETHASONE 2 MG: 2 TABLET ORAL at 22:05

## 2020-12-17 RX ADMIN — CEPHALEXIN 500 MG: 500 CAPSULE ORAL at 12:05

## 2020-12-17 RX ADMIN — HYDROMORPHONE HYDROCHLORIDE 0.5 MG: 1 INJECTION, SOLUTION INTRAMUSCULAR; INTRAVENOUS; SUBCUTANEOUS at 08:59

## 2020-12-17 RX ADMIN — CEPHALEXIN 500 MG: 500 CAPSULE ORAL at 05:19

## 2020-12-17 RX ADMIN — HEPARIN SODIUM 5000 UNITS: 5000 INJECTION INTRAVENOUS; SUBCUTANEOUS at 15:23

## 2020-12-17 RX ADMIN — RANOLAZINE 500 MG: 500 TABLET, FILM COATED, EXTENDED RELEASE ORAL at 22:07

## 2020-12-17 RX ADMIN — GABAPENTIN 200 MG: 100 CAPSULE ORAL at 15:22

## 2020-12-17 RX ADMIN — ACETAMINOPHEN 975 MG: 325 TABLET, FILM COATED ORAL at 22:06

## 2020-12-17 RX ADMIN — FOLIC ACID 1 MG: 1 TABLET ORAL at 08:58

## 2020-12-17 RX ADMIN — DOCUSATE SODIUM 100 MG: 100 CAPSULE ORAL at 08:58

## 2020-12-17 RX ADMIN — METOPROLOL TARTRATE 25 MG: 25 TABLET, FILM COATED ORAL at 22:04

## 2020-12-17 RX ADMIN — DEXAMETHASONE 2 MG: 2 TABLET ORAL at 08:58

## 2020-12-17 RX ADMIN — METHOCARBAMOL TABLETS 750 MG: 750 TABLET, COATED ORAL at 05:19

## 2020-12-17 RX ADMIN — GLYCERIN, HYPROMELLOSE, POLYETHYLENE GLYCOL 1 DROP: .2; .2; 1 LIQUID OPHTHALMIC at 17:19

## 2020-12-18 LAB
ABO GROUP BLD BPU: NORMAL
ABO GROUP BLD BPU: NORMAL
BPU ID: NORMAL
BPU ID: NORMAL
CROSSMATCH: NORMAL
CROSSMATCH: NORMAL
GLUCOSE SERPL-MCNC: 121 MG/DL (ref 65–140)
GLUCOSE SERPL-MCNC: 132 MG/DL (ref 65–140)
GLUCOSE SERPL-MCNC: 167 MG/DL (ref 65–140)
GLUCOSE SERPL-MCNC: 84 MG/DL (ref 65–140)
UNIT DISPENSE STATUS: NORMAL
UNIT DISPENSE STATUS: NORMAL
UNIT PRODUCT CODE: NORMAL
UNIT PRODUCT CODE: NORMAL
UNIT RH: NORMAL
UNIT RH: NORMAL

## 2020-12-18 PROCEDURE — 82948 REAGENT STRIP/BLOOD GLUCOSE: CPT

## 2020-12-18 PROCEDURE — 99024 POSTOP FOLLOW-UP VISIT: CPT | Performed by: PHYSICIAN ASSISTANT

## 2020-12-18 RX ORDER — LISINOPRIL 10 MG/1
10 TABLET ORAL DAILY
Status: DISCONTINUED | OUTPATIENT
Start: 2020-12-18 | End: 2020-12-21 | Stop reason: HOSPADM

## 2020-12-18 RX ADMIN — FLUTICASONE FUROATE AND VILANTEROL TRIFENATATE 1 PUFF: 200; 25 POWDER RESPIRATORY (INHALATION) at 09:19

## 2020-12-18 RX ADMIN — TAMSULOSIN HYDROCHLORIDE 0.4 MG: 0.4 CAPSULE ORAL at 09:14

## 2020-12-18 RX ADMIN — MAGNESIUM OXIDE TAB 400 MG (241.3 MG ELEMENTAL MG) 400 MG: 400 (241.3 MG) TAB at 09:14

## 2020-12-18 RX ADMIN — ACETAMINOPHEN 975 MG: 325 TABLET, FILM COATED ORAL at 05:56

## 2020-12-18 RX ADMIN — OXYCODONE HYDROCHLORIDE 10 MG: 10 TABLET ORAL at 09:14

## 2020-12-18 RX ADMIN — HYDROMORPHONE HYDROCHLORIDE 0.5 MG: 1 INJECTION, SOLUTION INTRAMUSCULAR; INTRAVENOUS; SUBCUTANEOUS at 02:39

## 2020-12-18 RX ADMIN — RANOLAZINE 500 MG: 500 TABLET, FILM COATED, EXTENDED RELEASE ORAL at 09:19

## 2020-12-18 RX ADMIN — FOLIC ACID 1 MG: 1 TABLET ORAL at 09:17

## 2020-12-18 RX ADMIN — GABAPENTIN 200 MG: 100 CAPSULE ORAL at 09:14

## 2020-12-18 RX ADMIN — DOCUSATE SODIUM 100 MG: 100 CAPSULE ORAL at 09:15

## 2020-12-18 RX ADMIN — SENNOSIDES 17.2 MG: 8.6 TABLET ORAL at 21:40

## 2020-12-18 RX ADMIN — Medication 1 TABLET: at 09:14

## 2020-12-18 RX ADMIN — RANOLAZINE 500 MG: 500 TABLET, FILM COATED, EXTENDED RELEASE ORAL at 21:39

## 2020-12-18 RX ADMIN — HYDROMORPHONE HYDROCHLORIDE 0.5 MG: 1 INJECTION, SOLUTION INTRAMUSCULAR; INTRAVENOUS; SUBCUTANEOUS at 21:42

## 2020-12-18 RX ADMIN — CEPHALEXIN 500 MG: 500 CAPSULE ORAL at 17:56

## 2020-12-18 RX ADMIN — INSULIN LISPRO 1 UNITS: 100 INJECTION, SOLUTION INTRAVENOUS; SUBCUTANEOUS at 21:41

## 2020-12-18 RX ADMIN — DEXAMETHASONE 2 MG: 2 TABLET ORAL at 21:40

## 2020-12-18 RX ADMIN — OXYCODONE HYDROCHLORIDE 10 MG: 10 TABLET ORAL at 01:06

## 2020-12-18 RX ADMIN — METHOCARBAMOL TABLETS 750 MG: 750 TABLET, COATED ORAL at 09:17

## 2020-12-18 RX ADMIN — AMLODIPINE BESYLATE 5 MG: 5 TABLET ORAL at 09:15

## 2020-12-18 RX ADMIN — MAGNESIUM OXIDE TAB 400 MG (241.3 MG ELEMENTAL MG) 400 MG: 400 (241.3 MG) TAB at 17:55

## 2020-12-18 RX ADMIN — DOCUSATE SODIUM 100 MG: 100 CAPSULE ORAL at 17:56

## 2020-12-18 RX ADMIN — HEPARIN SODIUM 5000 UNITS: 5000 INJECTION INTRAVENOUS; SUBCUTANEOUS at 13:08

## 2020-12-18 RX ADMIN — METOPROLOL TARTRATE 25 MG: 25 TABLET, FILM COATED ORAL at 09:17

## 2020-12-18 RX ADMIN — GABAPENTIN 200 MG: 100 CAPSULE ORAL at 17:55

## 2020-12-18 RX ADMIN — PRAVASTATIN SODIUM 40 MG: 40 TABLET ORAL at 17:56

## 2020-12-18 RX ADMIN — OXYCODONE HYDROCHLORIDE 10 MG: 10 TABLET ORAL at 17:56

## 2020-12-18 RX ADMIN — HEPARIN SODIUM 5000 UNITS: 5000 INJECTION INTRAVENOUS; SUBCUTANEOUS at 21:41

## 2020-12-18 RX ADMIN — GABAPENTIN 200 MG: 100 CAPSULE ORAL at 21:40

## 2020-12-18 RX ADMIN — CEPHALEXIN 500 MG: 500 CAPSULE ORAL at 11:58

## 2020-12-18 RX ADMIN — METOPROLOL TARTRATE 25 MG: 25 TABLET, FILM COATED ORAL at 21:40

## 2020-12-18 RX ADMIN — DEXAMETHASONE 2 MG: 2 TABLET ORAL at 09:14

## 2020-12-18 RX ADMIN — HEPARIN SODIUM 5000 UNITS: 5000 INJECTION INTRAVENOUS; SUBCUTANEOUS at 05:56

## 2020-12-18 RX ADMIN — ACETAMINOPHEN 975 MG: 325 TABLET, FILM COATED ORAL at 21:40

## 2020-12-18 RX ADMIN — CEPHALEXIN 500 MG: 500 CAPSULE ORAL at 05:56

## 2020-12-18 RX ADMIN — THIAMINE HCL TAB 100 MG 100 MG: 100 TAB at 09:17

## 2020-12-18 RX ADMIN — LISINOPRIL 10 MG: 10 TABLET ORAL at 09:17

## 2020-12-18 RX ADMIN — CEPHALEXIN 500 MG: 500 CAPSULE ORAL at 00:40

## 2020-12-18 RX ADMIN — HYDROMORPHONE HYDROCHLORIDE 0.5 MG: 1 INJECTION, SOLUTION INTRAMUSCULAR; INTRAVENOUS; SUBCUTANEOUS at 12:00

## 2020-12-18 RX ADMIN — ACETAMINOPHEN 975 MG: 325 TABLET, FILM COATED ORAL at 13:08

## 2020-12-18 RX ADMIN — METHOCARBAMOL TABLETS 750 MG: 750 TABLET, COATED ORAL at 21:40

## 2020-12-19 LAB
GLUCOSE SERPL-MCNC: 111 MG/DL (ref 65–140)
GLUCOSE SERPL-MCNC: 120 MG/DL (ref 65–140)
GLUCOSE SERPL-MCNC: 126 MG/DL (ref 65–140)
GLUCOSE SERPL-MCNC: 131 MG/DL (ref 65–140)

## 2020-12-19 PROCEDURE — 99233 SBSQ HOSP IP/OBS HIGH 50: CPT | Performed by: SURGERY

## 2020-12-19 PROCEDURE — 99024 POSTOP FOLLOW-UP VISIT: CPT | Performed by: PHYSICIAN ASSISTANT

## 2020-12-19 PROCEDURE — 82948 REAGENT STRIP/BLOOD GLUCOSE: CPT

## 2020-12-19 RX ADMIN — RANOLAZINE 500 MG: 500 TABLET, FILM COATED, EXTENDED RELEASE ORAL at 09:49

## 2020-12-19 RX ADMIN — OXYCODONE HYDROCHLORIDE 5 MG: 5 TABLET ORAL at 18:04

## 2020-12-19 RX ADMIN — GABAPENTIN 200 MG: 100 CAPSULE ORAL at 18:02

## 2020-12-19 RX ADMIN — PRAVASTATIN SODIUM 40 MG: 40 TABLET ORAL at 18:02

## 2020-12-19 RX ADMIN — TAMSULOSIN HYDROCHLORIDE 0.4 MG: 0.4 CAPSULE ORAL at 09:49

## 2020-12-19 RX ADMIN — OXYCODONE HYDROCHLORIDE 10 MG: 10 TABLET ORAL at 22:15

## 2020-12-19 RX ADMIN — MAGNESIUM OXIDE TAB 400 MG (241.3 MG ELEMENTAL MG) 400 MG: 400 (241.3 MG) TAB at 09:49

## 2020-12-19 RX ADMIN — LISINOPRIL 10 MG: 10 TABLET ORAL at 09:48

## 2020-12-19 RX ADMIN — HEPARIN SODIUM 5000 UNITS: 5000 INJECTION INTRAVENOUS; SUBCUTANEOUS at 05:12

## 2020-12-19 RX ADMIN — METHOCARBAMOL TABLETS 750 MG: 750 TABLET, COATED ORAL at 22:15

## 2020-12-19 RX ADMIN — METOPROLOL TARTRATE 25 MG: 25 TABLET, FILM COATED ORAL at 21:59

## 2020-12-19 RX ADMIN — GLYCERIN, HYPROMELLOSE, POLYETHYLENE GLYCOL 1 DROP: .2; .2; 1 LIQUID OPHTHALMIC at 13:12

## 2020-12-19 RX ADMIN — MAGNESIUM OXIDE TAB 400 MG (241.3 MG ELEMENTAL MG) 400 MG: 400 (241.3 MG) TAB at 18:02

## 2020-12-19 RX ADMIN — GABAPENTIN 200 MG: 100 CAPSULE ORAL at 21:58

## 2020-12-19 RX ADMIN — SENNOSIDES 17.2 MG: 8.6 TABLET ORAL at 21:58

## 2020-12-19 RX ADMIN — OXYCODONE HYDROCHLORIDE 5 MG: 5 TABLET ORAL at 13:11

## 2020-12-19 RX ADMIN — HEPARIN SODIUM 5000 UNITS: 5000 INJECTION INTRAVENOUS; SUBCUTANEOUS at 21:58

## 2020-12-19 RX ADMIN — ACETAMINOPHEN 975 MG: 325 TABLET, FILM COATED ORAL at 05:11

## 2020-12-19 RX ADMIN — CEPHALEXIN 500 MG: 500 CAPSULE ORAL at 18:02

## 2020-12-19 RX ADMIN — HEPARIN SODIUM 5000 UNITS: 5000 INJECTION INTRAVENOUS; SUBCUTANEOUS at 13:08

## 2020-12-19 RX ADMIN — CEPHALEXIN 500 MG: 500 CAPSULE ORAL at 05:11

## 2020-12-19 RX ADMIN — CEPHALEXIN 500 MG: 500 CAPSULE ORAL at 01:22

## 2020-12-19 RX ADMIN — FOLIC ACID 1 MG: 1 TABLET ORAL at 09:49

## 2020-12-19 RX ADMIN — DOCUSATE SODIUM 100 MG: 100 CAPSULE ORAL at 18:02

## 2020-12-19 RX ADMIN — Medication 1 TABLET: at 09:48

## 2020-12-19 RX ADMIN — METOPROLOL TARTRATE 25 MG: 25 TABLET, FILM COATED ORAL at 09:48

## 2020-12-19 RX ADMIN — DEXAMETHASONE 2 MG: 2 TABLET ORAL at 09:48

## 2020-12-19 RX ADMIN — CEPHALEXIN 500 MG: 500 CAPSULE ORAL at 13:08

## 2020-12-19 RX ADMIN — HYDROMORPHONE HYDROCHLORIDE 0.5 MG: 1 INJECTION, SOLUTION INTRAMUSCULAR; INTRAVENOUS; SUBCUTANEOUS at 15:46

## 2020-12-19 RX ADMIN — DOCUSATE SODIUM 100 MG: 100 CAPSULE ORAL at 09:49

## 2020-12-19 RX ADMIN — RANOLAZINE 500 MG: 500 TABLET, FILM COATED, EXTENDED RELEASE ORAL at 22:00

## 2020-12-19 RX ADMIN — FLUTICASONE FUROATE AND VILANTEROL TRIFENATATE 1 PUFF: 200; 25 POWDER RESPIRATORY (INHALATION) at 09:49

## 2020-12-19 RX ADMIN — THIAMINE HCL TAB 100 MG 100 MG: 100 TAB at 09:48

## 2020-12-19 RX ADMIN — ACETAMINOPHEN 975 MG: 325 TABLET, FILM COATED ORAL at 21:58

## 2020-12-19 RX ADMIN — CEPHALEXIN 500 MG: 500 CAPSULE ORAL at 23:04

## 2020-12-19 RX ADMIN — METHOCARBAMOL TABLETS 750 MG: 750 TABLET, COATED ORAL at 13:11

## 2020-12-19 RX ADMIN — GABAPENTIN 200 MG: 100 CAPSULE ORAL at 09:49

## 2020-12-19 RX ADMIN — AMLODIPINE BESYLATE 5 MG: 5 TABLET ORAL at 09:49

## 2020-12-20 LAB
BASOPHILS # BLD AUTO: 0.1 THOUSANDS/ΜL (ref 0–0.1)
BASOPHILS NFR BLD AUTO: 1 % (ref 0–1)
EOSINOPHIL # BLD AUTO: 0.13 THOUSAND/ΜL (ref 0–0.61)
EOSINOPHIL NFR BLD AUTO: 1 % (ref 0–6)
ERYTHROCYTE [DISTWIDTH] IN BLOOD BY AUTOMATED COUNT: 12.7 % (ref 11.6–15.1)
GLUCOSE SERPL-MCNC: 118 MG/DL (ref 65–140)
GLUCOSE SERPL-MCNC: 129 MG/DL (ref 65–140)
GLUCOSE SERPL-MCNC: 64 MG/DL (ref 65–140)
GLUCOSE SERPL-MCNC: 95 MG/DL (ref 65–140)
HCT VFR BLD AUTO: 35.3 % (ref 36.5–49.3)
HGB BLD-MCNC: 11.3 G/DL (ref 12–17)
IMM GRANULOCYTES # BLD AUTO: 0.12 THOUSAND/UL (ref 0–0.2)
IMM GRANULOCYTES NFR BLD AUTO: 1 % (ref 0–2)
LYMPHOCYTES # BLD AUTO: 2.26 THOUSANDS/ΜL (ref 0.6–4.47)
LYMPHOCYTES NFR BLD AUTO: 24 % (ref 14–44)
MCH RBC QN AUTO: 32.7 PG (ref 26.8–34.3)
MCHC RBC AUTO-ENTMCNC: 32 G/DL (ref 31.4–37.4)
MCV RBC AUTO: 102 FL (ref 82–98)
MONOCYTES # BLD AUTO: 0.89 THOUSAND/ΜL (ref 0.17–1.22)
MONOCYTES NFR BLD AUTO: 9 % (ref 4–12)
NEUTROPHILS # BLD AUTO: 6 THOUSANDS/ΜL (ref 1.85–7.62)
NEUTS SEG NFR BLD AUTO: 64 % (ref 43–75)
NRBC BLD AUTO-RTO: 0 /100 WBCS
PLATELET # BLD AUTO: 242 THOUSANDS/UL (ref 149–390)
PLATELET # BLD AUTO: 252 THOUSANDS/UL (ref 149–390)
PMV BLD AUTO: 10.5 FL (ref 8.9–12.7)
PMV BLD AUTO: 11.1 FL (ref 8.9–12.7)
RBC # BLD AUTO: 3.46 MILLION/UL (ref 3.88–5.62)
WBC # BLD AUTO: 9.5 THOUSAND/UL (ref 4.31–10.16)

## 2020-12-20 PROCEDURE — 99233 SBSQ HOSP IP/OBS HIGH 50: CPT | Performed by: SURGERY

## 2020-12-20 PROCEDURE — 85049 AUTOMATED PLATELET COUNT: CPT | Performed by: PHYSICIAN ASSISTANT

## 2020-12-20 PROCEDURE — 99024 POSTOP FOLLOW-UP VISIT: CPT | Performed by: PHYSICIAN ASSISTANT

## 2020-12-20 PROCEDURE — 85025 COMPLETE CBC W/AUTO DIFF WBC: CPT | Performed by: EMERGENCY MEDICINE

## 2020-12-20 PROCEDURE — 82948 REAGENT STRIP/BLOOD GLUCOSE: CPT

## 2020-12-20 RX ORDER — HEPARIN SODIUM 5000 [USP'U]/ML
5000 INJECTION, SOLUTION INTRAVENOUS; SUBCUTANEOUS EVERY 8 HOURS SCHEDULED
Status: DISCONTINUED | OUTPATIENT
Start: 2020-12-20 | End: 2020-12-21 | Stop reason: HOSPADM

## 2020-12-20 RX ORDER — HEPARIN SODIUM 5000 [USP'U]/ML
5000 INJECTION, SOLUTION INTRAVENOUS; SUBCUTANEOUS EVERY 8 HOURS SCHEDULED
Status: DISCONTINUED | OUTPATIENT
Start: 2020-12-20 | End: 2020-12-20

## 2020-12-20 RX ADMIN — CEPHALEXIN 500 MG: 500 CAPSULE ORAL at 11:40

## 2020-12-20 RX ADMIN — ACETAMINOPHEN 975 MG: 325 TABLET, FILM COATED ORAL at 21:30

## 2020-12-20 RX ADMIN — HYDROMORPHONE HYDROCHLORIDE 0.5 MG: 1 INJECTION, SOLUTION INTRAMUSCULAR; INTRAVENOUS; SUBCUTANEOUS at 17:16

## 2020-12-20 RX ADMIN — HEPARIN SODIUM 5000 UNITS: 5000 INJECTION INTRAVENOUS; SUBCUTANEOUS at 21:30

## 2020-12-20 RX ADMIN — CEPHALEXIN 500 MG: 500 CAPSULE ORAL at 23:03

## 2020-12-20 RX ADMIN — TAMSULOSIN HYDROCHLORIDE 0.4 MG: 0.4 CAPSULE ORAL at 08:05

## 2020-12-20 RX ADMIN — MAGNESIUM OXIDE TAB 400 MG (241.3 MG ELEMENTAL MG) 400 MG: 400 (241.3 MG) TAB at 08:05

## 2020-12-20 RX ADMIN — OXYCODONE HYDROCHLORIDE 10 MG: 10 TABLET ORAL at 05:50

## 2020-12-20 RX ADMIN — RANOLAZINE 500 MG: 500 TABLET, FILM COATED, EXTENDED RELEASE ORAL at 08:07

## 2020-12-20 RX ADMIN — FOLIC ACID 1 MG: 1 TABLET ORAL at 08:04

## 2020-12-20 RX ADMIN — THIAMINE HCL TAB 100 MG 100 MG: 100 TAB at 08:04

## 2020-12-20 RX ADMIN — GABAPENTIN 200 MG: 100 CAPSULE ORAL at 17:14

## 2020-12-20 RX ADMIN — DOCUSATE SODIUM 100 MG: 100 CAPSULE ORAL at 08:05

## 2020-12-20 RX ADMIN — PRAVASTATIN SODIUM 40 MG: 40 TABLET ORAL at 17:14

## 2020-12-20 RX ADMIN — CEPHALEXIN 500 MG: 500 CAPSULE ORAL at 17:14

## 2020-12-20 RX ADMIN — DOCUSATE SODIUM 100 MG: 100 CAPSULE ORAL at 17:14

## 2020-12-20 RX ADMIN — ACETAMINOPHEN 975 MG: 325 TABLET, FILM COATED ORAL at 13:55

## 2020-12-20 RX ADMIN — FLUTICASONE FUROATE AND VILANTEROL TRIFENATATE 1 PUFF: 200; 25 POWDER RESPIRATORY (INHALATION) at 08:05

## 2020-12-20 RX ADMIN — LISINOPRIL 10 MG: 10 TABLET ORAL at 08:05

## 2020-12-20 RX ADMIN — GABAPENTIN 200 MG: 100 CAPSULE ORAL at 08:05

## 2020-12-20 RX ADMIN — GABAPENTIN 200 MG: 100 CAPSULE ORAL at 21:30

## 2020-12-20 RX ADMIN — OXYCODONE HYDROCHLORIDE 10 MG: 10 TABLET ORAL at 21:32

## 2020-12-20 RX ADMIN — OXYCODONE HYDROCHLORIDE 10 MG: 10 TABLET ORAL at 13:59

## 2020-12-20 RX ADMIN — ACETAMINOPHEN 975 MG: 325 TABLET, FILM COATED ORAL at 05:50

## 2020-12-20 RX ADMIN — METOPROLOL TARTRATE 25 MG: 25 TABLET, FILM COATED ORAL at 08:04

## 2020-12-20 RX ADMIN — AMLODIPINE BESYLATE 5 MG: 5 TABLET ORAL at 08:05

## 2020-12-20 RX ADMIN — RANOLAZINE 500 MG: 500 TABLET, FILM COATED, EXTENDED RELEASE ORAL at 21:33

## 2020-12-20 RX ADMIN — Medication 1 TABLET: at 08:05

## 2020-12-20 RX ADMIN — MAGNESIUM OXIDE TAB 400 MG (241.3 MG ELEMENTAL MG) 400 MG: 400 (241.3 MG) TAB at 17:14

## 2020-12-20 RX ADMIN — HEPARIN SODIUM 5000 UNITS: 5000 INJECTION INTRAVENOUS; SUBCUTANEOUS at 13:55

## 2020-12-20 RX ADMIN — METOPROLOL TARTRATE 25 MG: 25 TABLET, FILM COATED ORAL at 21:32

## 2020-12-20 RX ADMIN — CEPHALEXIN 500 MG: 500 CAPSULE ORAL at 05:52

## 2020-12-21 VITALS
HEART RATE: 92 BPM | SYSTOLIC BLOOD PRESSURE: 102 MMHG | OXYGEN SATURATION: 95 % | HEIGHT: 74 IN | RESPIRATION RATE: 16 BRPM | TEMPERATURE: 98 F | BODY MASS INDEX: 24.93 KG/M2 | DIASTOLIC BLOOD PRESSURE: 67 MMHG | WEIGHT: 194.22 LBS

## 2020-12-21 LAB
GLUCOSE SERPL-MCNC: 114 MG/DL (ref 65–140)
GLUCOSE SERPL-MCNC: 90 MG/DL (ref 65–140)
GLUCOSE SERPL-MCNC: 92 MG/DL (ref 65–140)
GLUCOSE SERPL-MCNC: 97 MG/DL (ref 65–140)

## 2020-12-21 PROCEDURE — 99232 SBSQ HOSP IP/OBS MODERATE 35: CPT | Performed by: SURGERY

## 2020-12-21 PROCEDURE — 99239 HOSP IP/OBS DSCHRG MGMT >30: CPT | Performed by: PHYSICIAN ASSISTANT

## 2020-12-21 PROCEDURE — 99024 POSTOP FOLLOW-UP VISIT: CPT | Performed by: NURSE PRACTITIONER

## 2020-12-21 PROCEDURE — 82948 REAGENT STRIP/BLOOD GLUCOSE: CPT

## 2020-12-21 PROCEDURE — 97116 GAIT TRAINING THERAPY: CPT

## 2020-12-21 RX ORDER — METHOCARBAMOL 750 MG/1
750 TABLET, FILM COATED ORAL 4 TIMES DAILY PRN
Qty: 30 TABLET | Refills: 0 | Status: SHIPPED | OUTPATIENT
Start: 2020-12-21 | End: 2021-04-12 | Stop reason: SDUPTHER

## 2020-12-21 RX ORDER — CEPHALEXIN 500 MG/1
500 CAPSULE ORAL EVERY 6 HOURS SCHEDULED
Status: DISCONTINUED | OUTPATIENT
Start: 2020-12-21 | End: 2020-12-21 | Stop reason: HOSPADM

## 2020-12-21 RX ORDER — RANOLAZINE 500 MG/1
500 TABLET, EXTENDED RELEASE ORAL EVERY 12 HOURS SCHEDULED
Qty: 30 TABLET | Refills: 1 | Status: SHIPPED | OUTPATIENT
Start: 2020-12-21 | End: 2021-01-21 | Stop reason: SDUPTHER

## 2020-12-21 RX ORDER — CEPHALEXIN 500 MG/1
500 CAPSULE ORAL EVERY 6 HOURS SCHEDULED
Qty: 9 CAPSULE | Refills: 0 | Status: SHIPPED | OUTPATIENT
Start: 2020-12-21 | End: 2020-12-24

## 2020-12-21 RX ORDER — LISINOPRIL 10 MG/1
10 TABLET ORAL DAILY
Qty: 30 TABLET | Refills: 1 | Status: SHIPPED | OUTPATIENT
Start: 2020-12-22 | End: 2021-02-17 | Stop reason: SDUPTHER

## 2020-12-21 RX ORDER — OXYCODONE HYDROCHLORIDE 5 MG/1
5 TABLET ORAL EVERY 4 HOURS PRN
Qty: 30 TABLET | Refills: 0 | Status: SHIPPED | OUTPATIENT
Start: 2020-12-21 | End: 2020-12-31

## 2020-12-21 RX ORDER — PRAVASTATIN SODIUM 40 MG
40 TABLET ORAL
Qty: 30 TABLET | Refills: 1 | Status: SHIPPED | OUTPATIENT
Start: 2020-12-21 | End: 2021-02-19 | Stop reason: SDUPTHER

## 2020-12-21 RX ORDER — AMLODIPINE BESYLATE 5 MG/1
5 TABLET ORAL DAILY
Qty: 30 TABLET | Refills: 1 | Status: SHIPPED | OUTPATIENT
Start: 2020-12-22 | End: 2021-05-25

## 2020-12-21 RX ORDER — CEPHALEXIN 500 MG/1
500 CAPSULE ORAL EVERY 6 HOURS SCHEDULED
Status: DISCONTINUED | OUTPATIENT
Start: 2020-12-21 | End: 2020-12-21

## 2020-12-21 RX ORDER — CEPHALEXIN 500 MG/1
500 CAPSULE ORAL EVERY 6 HOURS SCHEDULED
Status: DISCONTINUED | OUTPATIENT
Start: 2020-12-17 | End: 2020-12-21

## 2020-12-21 RX ORDER — GABAPENTIN 100 MG/1
200 CAPSULE ORAL 3 TIMES DAILY
Qty: 30 CAPSULE | Refills: 0 | Status: SHIPPED | OUTPATIENT
Start: 2020-12-21 | End: 2020-12-29

## 2020-12-21 RX ADMIN — DOCUSATE SODIUM 100 MG: 100 CAPSULE ORAL at 09:33

## 2020-12-21 RX ADMIN — FOLIC ACID 1 MG: 1 TABLET ORAL at 09:33

## 2020-12-21 RX ADMIN — THIAMINE HCL TAB 100 MG 100 MG: 100 TAB at 09:33

## 2020-12-21 RX ADMIN — GABAPENTIN 200 MG: 100 CAPSULE ORAL at 17:06

## 2020-12-21 RX ADMIN — CEPHALEXIN 500 MG: 500 CAPSULE ORAL at 05:48

## 2020-12-21 RX ADMIN — OXYCODONE HYDROCHLORIDE 10 MG: 10 TABLET ORAL at 05:48

## 2020-12-21 RX ADMIN — DOCUSATE SODIUM 100 MG: 100 CAPSULE ORAL at 17:06

## 2020-12-21 RX ADMIN — PRAVASTATIN SODIUM 40 MG: 40 TABLET ORAL at 17:06

## 2020-12-21 RX ADMIN — LISINOPRIL 10 MG: 10 TABLET ORAL at 09:33

## 2020-12-21 RX ADMIN — GABAPENTIN 200 MG: 100 CAPSULE ORAL at 09:33

## 2020-12-21 RX ADMIN — ACETAMINOPHEN 975 MG: 325 TABLET, FILM COATED ORAL at 13:31

## 2020-12-21 RX ADMIN — TAMSULOSIN HYDROCHLORIDE 0.4 MG: 0.4 CAPSULE ORAL at 09:33

## 2020-12-21 RX ADMIN — MAGNESIUM OXIDE TAB 400 MG (241.3 MG ELEMENTAL MG) 400 MG: 400 (241.3 MG) TAB at 09:33

## 2020-12-21 RX ADMIN — Medication 1 TABLET: at 09:33

## 2020-12-21 RX ADMIN — CEPHALEXIN 500 MG: 500 CAPSULE ORAL at 11:29

## 2020-12-21 RX ADMIN — ACETAMINOPHEN 975 MG: 325 TABLET, FILM COATED ORAL at 05:48

## 2020-12-21 RX ADMIN — MAGNESIUM OXIDE TAB 400 MG (241.3 MG ELEMENTAL MG) 400 MG: 400 (241.3 MG) TAB at 17:06

## 2020-12-21 RX ADMIN — METOPROLOL TARTRATE 25 MG: 25 TABLET, FILM COATED ORAL at 09:33

## 2020-12-21 RX ADMIN — FLUTICASONE FUROATE AND VILANTEROL TRIFENATATE 1 PUFF: 200; 25 POWDER RESPIRATORY (INHALATION) at 09:34

## 2020-12-21 RX ADMIN — CEPHALEXIN 500 MG: 500 CAPSULE ORAL at 17:06

## 2020-12-21 RX ADMIN — AMLODIPINE BESYLATE 5 MG: 5 TABLET ORAL at 09:33

## 2020-12-21 RX ADMIN — RANOLAZINE 500 MG: 500 TABLET, FILM COATED, EXTENDED RELEASE ORAL at 09:34

## 2020-12-22 ENCOUNTER — PATIENT OUTREACH (OUTPATIENT)
Dept: FAMILY MEDICINE CLINIC | Facility: CLINIC | Age: 60
End: 2020-12-22

## 2020-12-22 ENCOUNTER — TELEPHONE (OUTPATIENT)
Dept: NEUROSURGERY | Facility: CLINIC | Age: 60
End: 2020-12-22

## 2020-12-22 DIAGNOSIS — Z71.89 COMPLEX CARE COORDINATION: Primary | ICD-10-CM

## 2020-12-23 DIAGNOSIS — G95.19 EDEMA, SPINAL CORD (HCC): ICD-10-CM

## 2020-12-28 ENCOUNTER — PATIENT OUTREACH (OUTPATIENT)
Dept: FAMILY MEDICINE CLINIC | Facility: CLINIC | Age: 60
End: 2020-12-28

## 2020-12-29 RX ORDER — GABAPENTIN 100 MG/1
CAPSULE ORAL
Qty: 30 CAPSULE | Refills: 0 | Status: SHIPPED | OUTPATIENT
Start: 2020-12-29 | End: 2021-03-11

## 2020-12-30 ENCOUNTER — TELEPHONE (OUTPATIENT)
Dept: NEUROSURGERY | Facility: CLINIC | Age: 60
End: 2020-12-30

## 2020-12-31 ENCOUNTER — TELEPHONE (OUTPATIENT)
Dept: NEUROSURGERY | Facility: CLINIC | Age: 60
End: 2020-12-31

## 2020-12-31 ENCOUNTER — CLINICAL SUPPORT (OUTPATIENT)
Dept: NEUROSURGERY | Facility: CLINIC | Age: 60
End: 2020-12-31

## 2020-12-31 VITALS — TEMPERATURE: 98.5 F | SYSTOLIC BLOOD PRESSURE: 120 MMHG | DIASTOLIC BLOOD PRESSURE: 70 MMHG

## 2020-12-31 DIAGNOSIS — Z98.890 POST-OPERATIVE STATE: ICD-10-CM

## 2020-12-31 DIAGNOSIS — Z48.89 AFTERCARE FOLLOWING SURGERY FOR INJURY AND TRAUMA: Primary | ICD-10-CM

## 2020-12-31 PROCEDURE — 99024 POSTOP FOLLOW-UP VISIT: CPT

## 2020-12-31 RX ORDER — CEPHALEXIN 500 MG/1
500 CAPSULE ORAL EVERY 6 HOURS SCHEDULED
Qty: 28 CAPSULE | Refills: 0 | Status: SHIPPED | OUTPATIENT
Start: 2020-12-31 | End: 2021-01-07

## 2021-01-04 ENCOUNTER — PATIENT OUTREACH (OUTPATIENT)
Dept: FAMILY MEDICINE CLINIC | Facility: CLINIC | Age: 61
End: 2021-01-04

## 2021-01-04 NOTE — PROGRESS NOTES
Returned patients phone call  [facility] requesting return call  Per Dr Osman Alcazar, patient signed out of STR  Received call from patient  Explained reasons why he left STR  States he is scheduled to start PT tomorrow  Transportation may be an issue  Will consider referral to [facility] (OUTPATIENT [facility]) if transportation remains an issue  Pt will check back with CM on Thursday

## 2021-01-06 ENCOUNTER — TELEPHONE (OUTPATIENT)
Dept: PHYSICAL THERAPY | Facility: CLINIC | Age: 61
End: 2021-01-06

## 2021-01-06 NOTE — TELEPHONE ENCOUNTER
Patient stated he did not have the phone number for the clinic and was not able to call about not being able to make it today  He rescheduled to Tuesday, 1- at 12:30  Provided patient with callback number and advised him to arrive 15 minutes early and he was in good verbal understanding

## 2021-01-07 ENCOUNTER — TELEPHONE (OUTPATIENT)
Dept: NEUROSURGERY | Facility: CLINIC | Age: 61
End: 2021-01-07

## 2021-01-07 NOTE — TELEPHONE ENCOUNTER
This RN did not receive a picture of the incision yet  Called patient to see if he was able to send the picture of the incision yet  Patient said he did not send the picture yet  Inquired when he is going to send the picture  Patient said either today, tomorrow, or next week  Asked for the picture asap  Patient said he does not have anyone to take the picture now  Since patient is experiencing troubles with obtaining a picture of the incision, offered the patient to be rescheduled for him to come into the office for an appointment  Patient declined and said he cannot come into the office due to transportation issues  Since patient cannot come in for an appointment, requested for patient to take a picture of the incision asap and send to this RN's work email address if patient is unable to come into the office  Patient agreed  Expressed the importance of following up  Patient expressed understanding

## 2021-01-07 NOTE — TELEPHONE ENCOUNTER
Left voice message to complete prescreening questions with patient prior to his nurse visit today  Patient did not answer  Requested for patient to call back

## 2021-01-07 NOTE — TELEPHONE ENCOUNTER
Received call from Jazmin Saravia stating he would not be able to make his appt due to transportation issues  Requested he send a picture of his incision to the office for review  Provided email address of RN AR who was going to see patient in office  Changed visit to virtual and notified RN AR

## 2021-01-08 ENCOUNTER — TELEPHONE (OUTPATIENT)
Dept: NEUROSURGERY | Facility: CLINIC | Age: 61
End: 2021-01-08

## 2021-01-08 NOTE — TELEPHONE ENCOUNTER
Called patient to remind him to send a picture of his incision to my work email address as soon as possible  Patient reports his friend will take a picture either tonight or tomorrow  Reminded patient of my work email address  Patient requested for this RN to text him the email address since he is not at his apartment right now with no access to pen/paper  Advised patient this RN is unable to text  Offered to send an email to his email account so he can reply back with a picture  Patient reports he does not have an email account  Patient will call back the office to obtain my work email address  Expressed the importance of following up  Patient said he will call back

## 2021-01-08 NOTE — TELEPHONE ENCOUNTER
Cass Pandya, call center, received a call from the patient  She provided the patient with my work email address  Patient will send picture when he has help from a friend

## 2021-01-11 ENCOUNTER — PATIENT OUTREACH (OUTPATIENT)
Dept: FAMILY MEDICINE CLINIC | Facility: CLINIC | Age: 61
End: 2021-01-11

## 2021-01-11 ENCOUNTER — TELEPHONE (OUTPATIENT)
Dept: NEUROSURGERY | Facility: CLINIC | Age: 61
End: 2021-01-11

## 2021-01-11 DIAGNOSIS — Z48.89 AFTERCARE FOLLOWING SURGERY FOR INJURY AND TRAUMA: Primary | ICD-10-CM

## 2021-01-11 NOTE — TELEPHONE ENCOUNTER
Received email from patient with updated pictures of incisions  Tigertext sent to Dr Kellie Ceja with picture of incisions  Awaiting for his response  Received email from patient with updated pictures of incisions  Tigertext sent to Dr Kellie Ceja with picture of incisions  Dr Kellie Ceja recommended for patient to obtain blood work, ESR and CRP  Also recommended for a CT soft tissue neck to be completed asap

## 2021-01-11 NOTE — TELEPHONE ENCOUNTER
Patient LM on nurse line requesting email address for nurse Eduardo Stringer so that he can send image of his incision  I called him back with no answer, however, I did leave him a voicemail with her email address  He is to call back with further questions or concerns

## 2021-01-11 NOTE — PROGRESS NOTES
Received phone call from patient  Pt states he needs a shower chair and assistance with wound care  Pt has been in contact with neurosurgery regarding his wounds, however he has not been able to get to them  Pt reports he is concerned about the appearance of his wounds  Consideration given to home care VNA services  Pt agreeable  Outreach to [employer] at this time  Outreach to patient  Agreeable to seeing a provider tomorrow at [facility] scheduled appointment prior to his scheduled PT appointment  Pt notified and agreeable to appointment  Pt states he still needs a shower chair  Discussed options for delivery  Pt states he cannot wait for one to be delivered  States he has not had a shower  CM will investigate options

## 2021-01-11 NOTE — TELEPHONE ENCOUNTER
Called patient to see if he was able to send a picture of his incision to my work email address  Patient did not answer  Requested for patient to send a picture of his incision asap  Also requested for patient to call the office  Provided my work email address and my direct line  To note, the neurosurgery office has been in contact with patient and has requested for a picture of incision to be sent multiple times since patient will not come into the office for an incision re-check

## 2021-01-11 NOTE — TELEPHONE ENCOUNTER
Called patient and notified him of Dr Jaylene Sharif recommendations  Scheduled CT for tomorrow on 1/12/21 at 2 pm at [place]  Patient accepted the appointment  Instructed patient to get obtain blood work asap  Patient said he will go tomorrow when he obtains the CT scan  Patient denies any trouble swallowing, fevers, or any new neurologic issues  He is aware the office will call him with results once available  He was appreciative

## 2021-01-12 ENCOUNTER — PATIENT OUTREACH (OUTPATIENT)
Dept: FAMILY MEDICINE CLINIC | Facility: CLINIC | Age: 61
End: 2021-01-12

## 2021-01-12 ENCOUNTER — OFFICE VISIT (OUTPATIENT)
Dept: FAMILY MEDICINE CLINIC | Facility: CLINIC | Age: 61
End: 2021-01-12
Payer: MEDICARE

## 2021-01-12 ENCOUNTER — HOSPITAL ENCOUNTER (OUTPATIENT)
Dept: RADIOLOGY | Facility: HOSPITAL | Age: 61
Discharge: HOME | End: 2021-01-12
Attending: NEUROLOGICAL SURGERY
Payer: MEDICARE

## 2021-01-12 ENCOUNTER — TELEPHONE (OUTPATIENT)
Dept: PHYSICAL THERAPY | Facility: CLINIC | Age: 61
End: 2021-01-12

## 2021-01-12 ENCOUNTER — TELEPHONE (OUTPATIENT)
Dept: NEUROSURGERY | Facility: CLINIC | Age: 61
End: 2021-01-12

## 2021-01-12 VITALS
WEIGHT: 196.1 LBS | SYSTOLIC BLOOD PRESSURE: 122 MMHG | DIASTOLIC BLOOD PRESSURE: 66 MMHG | RESPIRATION RATE: 22 BRPM | OXYGEN SATURATION: 96 % | HEIGHT: 74 IN | HEART RATE: 87 BPM | BODY MASS INDEX: 25.17 KG/M2 | TEMPERATURE: 97.7 F

## 2021-01-12 DIAGNOSIS — G95.20 SPINAL CORD COMPRESSION, POST-TRAUMATIC (HCC): Primary | ICD-10-CM

## 2021-01-12 DIAGNOSIS — F10.10 ALCOHOL ABUSE: Chronic | ICD-10-CM

## 2021-01-12 DIAGNOSIS — Z48.89 AFTERCARE FOLLOWING SURGERY FOR INJURY AND TRAUMA: ICD-10-CM

## 2021-01-12 PROBLEM — F10.939 ALCOHOL WITHDRAWAL (HCC): Status: RESOLVED | Noted: 2019-06-07 | Resolved: 2021-01-12

## 2021-01-12 PROBLEM — F10.929 ALCOHOL INTOXICATION (HCC): Status: RESOLVED | Noted: 2019-11-23 | Resolved: 2021-01-12

## 2021-01-12 PROBLEM — F10.239 ALCOHOL WITHDRAWAL (HCC): Status: RESOLVED | Noted: 2019-06-07 | Resolved: 2021-01-12

## 2021-01-12 PROBLEM — S01.81XA FOREHEAD LACERATION: Status: RESOLVED | Noted: 2020-12-14 | Resolved: 2021-01-12

## 2021-01-12 PROBLEM — S01.91XA LACERATION OF HEAD: Status: RESOLVED | Noted: 2020-12-05 | Resolved: 2021-01-12

## 2021-01-12 PROBLEM — E83.42 HYPOMAGNESEMIA: Status: RESOLVED | Noted: 2018-10-10 | Resolved: 2021-01-12

## 2021-01-12 PROBLEM — E87.8 ELECTROLYTE ABNORMALITY: Status: RESOLVED | Noted: 2020-12-05 | Resolved: 2021-01-12

## 2021-01-12 PROBLEM — Z71.6 ENCOUNTER FOR SMOKING CESSATION COUNSELING: Status: RESOLVED | Noted: 2020-09-14 | Resolved: 2021-01-12

## 2021-01-12 PROCEDURE — 99212 OFFICE O/P EST SF 10 MIN: CPT | Performed by: FAMILY MEDICINE

## 2021-01-12 PROCEDURE — 70491 CT SOFT TISSUE NECK W/DYE: CPT

## 2021-01-12 PROCEDURE — G1004 CDSM NDSC: HCPCS

## 2021-01-12 RX ORDER — FOSINOPRIL SODIUM 10 MG/1
TABLET ORAL
COMMUNITY
Start: 2020-12-31 | End: 2021-02-17 | Stop reason: ALTCHOICE

## 2021-01-12 RX ADMIN — IOHEXOL 85 ML: 350 INJECTION, SOLUTION INTRAVENOUS at 13:40
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
## 2021-01-12 NOTE — PROGRESS NOTES
Met with patient during PCP visit  Pt was contacted by neurosurgery after photos of incision were successfully sent  Pt is scheduled for CT soft neck this afternoon  Lyft ride arranged for patient to go to hospital for additional testing  Once results are provided will discuss follow up needs

## 2021-01-12 NOTE — TELEPHONE ENCOUNTER
Dr Annita Stephenson reviewed the imaging  He reports the posterior surgery site looks as expected and the imaging looks good  He is not concerned  He still recommends for the patient to obtain blood work if possible  He requested for this RN to notify the patient of the results once the patient returns the call

## 2021-01-13 DIAGNOSIS — I10 ESSENTIAL (PRIMARY) HYPERTENSION: Chronic | ICD-10-CM

## 2021-01-13 NOTE — TELEPHONE ENCOUNTER
Called patient  Patient did not answer  Left a voice message requesting for a call back to discuss CT results and to remind him to get his blood work done  Provided my direct line  Will await

## 2021-01-14 NOTE — TELEPHONE ENCOUNTER
Called patient  Notified patient of Dr Bustamante Memory response  Patient expressed understanding  Reminded patient to obtain blood work asap  Patient reports he has not had a chance yet to obtain blood work since he goes to bed late and sleeps in the next day  Expressed the importance of obtaining blood work asap  Patient said he will try to go tomorrow  He was appreciative for the call

## 2021-01-18 ENCOUNTER — TELEPHONE (OUTPATIENT)
Dept: NEUROSURGERY | Facility: CLINIC | Age: 61
End: 2021-01-18

## 2021-01-18 NOTE — TELEPHONE ENCOUNTER
Patient called the office requesting for a refill of the lopressor for his blood pressure  Advised patient to contact his PCP for refills of this medication  Patient expressed understanding  Reminded patient to complete blood work that was ordered on 1/11/21 asap  Patient reports his sleeping schedule has been "off" and will obtain blood work today  Expressed the importance

## 2021-01-19 ENCOUNTER — TELEPHONE (OUTPATIENT)
Dept: NEUROSURGERY | Facility: CLINIC | Age: 61
End: 2021-01-19

## 2021-01-19 RX ORDER — RANOLAZINE 500 MG/1
TABLET, EXTENDED RELEASE ORAL
Qty: 30 TABLET | Refills: 1 | OUTPATIENT
Start: 2021-01-19

## 2021-01-19 NOTE — TELEPHONE ENCOUNTER
Noticed patient still has not obtained blood work that was ordered on 1/11/21  Called patient to remind him to complete blood work asap  Patient did not answer  Left a voice message stating to obtain blood work asap and to call back with questions  Provided my direct line

## 2021-01-19 NOTE — TELEPHONE ENCOUNTER
Please adjust medication as indicated for patient  Appears to be a chronic medication for patient that the trauma office does not manage

## 2021-01-21 DIAGNOSIS — I10 ESSENTIAL (PRIMARY) HYPERTENSION: Chronic | ICD-10-CM

## 2021-01-21 RX ORDER — RANOLAZINE 500 MG/1
500 TABLET, EXTENDED RELEASE ORAL EVERY 12 HOURS SCHEDULED
Qty: 30 TABLET | Refills: 0 | Status: SHIPPED | OUTPATIENT
Start: 2021-01-21 | End: 2021-04-12 | Stop reason: SDUPTHER

## 2021-01-22 ENCOUNTER — PATIENT OUTREACH (OUTPATIENT)
Dept: FAMILY MEDICINE CLINIC | Facility: CLINIC | Age: 61
End: 2021-01-22

## 2021-01-22 NOTE — PROGRESS NOTES
Received phone call from patient  Reports that CT of neck was completed  No additional interventions were ordered at this time  Pt still struggling to shower  Needs a shower chair  Offered to order one through [facility]  Pt states he is unsure if he would be awake to receive it  Offered donated shower chair if someone could pick it up for him  States he has a friend that will  shower chair  Discussed options for PT  Pt states he sleeps during the day, which makes it hard for him to have any appointments  Discussed strategies to change sleep/wake habits  Offered home PT services through [facility] (OUTPATIENT C[facility])  Explained services that they offer  Will think about these options over the weekend and will report back to CM

## 2021-01-27 ENCOUNTER — PATIENT OUTREACH (OUTPATIENT)
Dept: FAMILY MEDICINE CLINIC | Facility: CLINIC | Age: 61
End: 2021-01-27

## 2021-01-28 ENCOUNTER — APPOINTMENT (OUTPATIENT)
Dept: LAB | Facility: HOSPITAL | Age: 61
End: 2021-01-28
Attending: NEUROLOGICAL SURGERY
Payer: MEDICARE

## 2021-01-28 DIAGNOSIS — Z48.89 AFTERCARE FOLLOWING SURGERY FOR INJURY AND TRAUMA: ICD-10-CM

## 2021-01-28 LAB
CRP SERPL QL: 14.8 MG/L
ERYTHROCYTE [SEDIMENTATION RATE] IN BLOOD: 31 MM/HOUR (ref 0–19)

## 2021-01-28 PROCEDURE — 85652 RBC SED RATE AUTOMATED: CPT

## 2021-01-28 PROCEDURE — 86140 C-REACTIVE PROTEIN: CPT

## 2021-01-28 PROCEDURE — 36415 COLL VENOUS BLD VENIPUNCTURE: CPT

## 2021-01-29 ENCOUNTER — PATIENT OUTREACH (OUTPATIENT)
Dept: FAMILY MEDICINE CLINIC | Facility: CLINIC | Age: 61
End: 2021-01-29

## 2021-01-29 ENCOUNTER — TELEPHONE (OUTPATIENT)
Dept: NEUROSURGERY | Facility: CLINIC | Age: 61
End: 2021-01-29

## 2021-01-29 NOTE — PROGRESS NOTES
Returned patients phone call  Pt needs an appointment to review medications  Conference call with     Appointment scheduled for 2/1 at 240 pm

## 2021-01-29 NOTE — TELEPHONE ENCOUNTER
Patient received blood work yesterday  Abnormal results  C-reactive protein 14 8 H    Sedimentation rate 31 H    Routing to Dr Carie Zaldivar and Aixa Domínguez

## 2021-02-03 ENCOUNTER — TELEPHONE (OUTPATIENT)
Dept: NEUROSURGERY | Facility: CLINIC | Age: 61
End: 2021-02-03

## 2021-02-03 NOTE — TELEPHONE ENCOUNTER
Telephoned patient as a f/u regarding Labs and CT and labs ordered 1/11/21 after photo assessment of operative area postop   Unable to leave message , miguel f/u call to determine status of cervical incision  Will route info to Dr Tia Arias     12/6/2020 Anterior cervical discectomy with fusion C4-C7; Posterior cervical decompression and fusion C2-T2 (N/A Spine Cervical)    1/11/ Photo review   Anterior Cervical incision photos with significant edema and erythremia   Posterior cervical incision wel healed no concerning features  1/12   CT NECK WITH CONTRAST IMPRESSION:   1   Status post anterior fusion C4-C7  No abnormality along the anterior incision tract  No evidence of hematoma in this location    2   Status post posterior fusion from C2 through T2  There is a fluid collection extending the length of the incision from the subcutaneous tissues to the laminectomy site with measurements listed above  Peripheral postcontrast enhancement is noted   which is a nonspecific finding  This may represents postoperative seroma versus infected collection such as abscess which should only be considered in the right clinical setting  Evaluation central canal is limited based on CT technique and further   limited by extensive beam hardening artifact from metallic hardware      Labs on 1/11/2021;    Contains abnormal data C-reactive protein--14 8  Sedimentation rate  31High

## 2021-02-03 NOTE — TELEPHONE ENCOUNTER
As long as he does not have any constitutional symptoms I would not make a big fuss his CT looks okay and he was not draining from his wounds

## 2021-02-11 DIAGNOSIS — I10 ESSENTIAL (PRIMARY) HYPERTENSION: Chronic | ICD-10-CM

## 2021-02-11 RX ORDER — LISINOPRIL 10 MG/1
TABLET ORAL
Qty: 90 TABLET | OUTPATIENT
Start: 2021-02-11

## 2021-02-11 RX ORDER — PRAVASTATIN SODIUM 40 MG
TABLET ORAL
Qty: 90 TABLET | OUTPATIENT
Start: 2021-02-11

## 2021-02-11 NOTE — TELEPHONE ENCOUNTER
Home medications for patient if he requires them  We simply discharged the patient   He will not follow-up regarding these medications with the trauma team

## 2021-02-15 ENCOUNTER — TELEPHONE (OUTPATIENT)
Dept: FAMILY MEDICINE CLINIC | Facility: CLINIC | Age: 61
End: 2021-02-15

## 2021-02-15 ENCOUNTER — TELEPHONE (OUTPATIENT)
Dept: BEHAVIORAL/MENTAL HEALTH CLINIC | Facility: CLINIC | Age: 61
End: 2021-02-15

## 2021-02-15 NOTE — TELEPHONE ENCOUNTER
Left patient a Vm asking to please call back to set up an apt  If patient calls back please refer to message below  Thank you            ----- Message from Nicole Ivey DO sent at 2/15/2021 10:50 AM EST -----  Please call patient and schedule a visit for medications refills  Thank you

## 2021-02-15 NOTE — TELEPHONE ENCOUNTER
----- Message from Phillip Arnold DO sent at 2/15/2021 10:50 AM EST -----  Please call patient and schedule a visit for medications refills  Thank you

## 2021-02-16 DIAGNOSIS — I10 ESSENTIAL (PRIMARY) HYPERTENSION: Chronic | ICD-10-CM

## 2021-02-17 DIAGNOSIS — I10 ESSENTIAL (PRIMARY) HYPERTENSION: Chronic | ICD-10-CM

## 2021-02-17 RX ORDER — LISINOPRIL 10 MG/1
10 TABLET ORAL DAILY
Qty: 30 TABLET | Refills: 1 | Status: ON HOLD | OUTPATIENT
Start: 2021-02-17 | End: 2021-04-26 | Stop reason: SDUPTHER

## 2021-02-17 RX ORDER — LISINOPRIL 10 MG/1
TABLET ORAL
Qty: 90 TABLET | OUTPATIENT
Start: 2021-02-17

## 2021-02-17 NOTE — TELEPHONE ENCOUNTER
Please reassess the application of lisinopril in this patient  The trauma team is no longer seeing the patient

## 2021-02-18 DIAGNOSIS — I10 ESSENTIAL (PRIMARY) HYPERTENSION: Chronic | ICD-10-CM

## 2021-02-18 RX ORDER — PRAVASTATIN SODIUM 40 MG
TABLET ORAL
Qty: 30 TABLET | Refills: 1 | OUTPATIENT
Start: 2021-02-18

## 2021-02-18 NOTE — TELEPHONE ENCOUNTER
Appears everything we helped prescribe for patient upon discharge now coming to in basket  I will continue to forward to his primary care provider listed  Also appears patient is going to be scheduled to refill his medications

## 2021-02-19 DIAGNOSIS — I10 ESSENTIAL (PRIMARY) HYPERTENSION: Chronic | ICD-10-CM

## 2021-02-19 RX ORDER — PRAVASTATIN SODIUM 40 MG
40 TABLET ORAL
Qty: 30 TABLET | Refills: 1 | Status: SHIPPED | OUTPATIENT
Start: 2021-02-19 | End: 2021-05-25

## 2021-02-19 NOTE — TELEPHONE ENCOUNTER
Quan Jenkins these keep getting sent to you  I really don't know how to make that stop  Just keep refusing them and forwarding them to me and I will refill them  Hopefully that resets everything so that when he needs a refill, they come to me instead  Thanks for your help   Neal [place]

## 2021-02-22 DIAGNOSIS — I10 ESSENTIAL (PRIMARY) HYPERTENSION: Chronic | ICD-10-CM

## 2021-02-25 ENCOUNTER — TRANSCRIBE ORDERS (OUTPATIENT)
Dept: ADMINISTRATIVE | Facility: HOSPITAL | Age: 61
End: 2021-02-25

## 2021-02-25 ENCOUNTER — TELEPHONE (OUTPATIENT)
Dept: FAMILY MEDICINE CLINIC | Facility: CLINIC | Age: 61
End: 2021-02-25

## 2021-02-25 ENCOUNTER — HOSPITAL ENCOUNTER (OUTPATIENT)
Dept: RADIOLOGY | Facility: HOSPITAL | Age: 61
Discharge: HOME | End: 2021-02-25
Payer: MEDICARE

## 2021-02-25 DIAGNOSIS — G95.19 EDEMA, SPINAL CORD (HCC): ICD-10-CM

## 2021-02-25 PROCEDURE — 72040 X-RAY EXAM NECK SPINE 2-3 VW: CPT

## 2021-03-11 ENCOUNTER — OFFICE VISIT (OUTPATIENT)
Dept: NEUROSURGERY | Facility: CLINIC | Age: 61
End: 2021-03-11

## 2021-03-11 VITALS
WEIGHT: 196 LBS | TEMPERATURE: 98.3 F | BODY MASS INDEX: 25.15 KG/M2 | HEIGHT: 74 IN | HEART RATE: 117 BPM | SYSTOLIC BLOOD PRESSURE: 138 MMHG | RESPIRATION RATE: 16 BRPM | DIASTOLIC BLOOD PRESSURE: 80 MMHG

## 2021-03-11 DIAGNOSIS — G95.19 EDEMA, SPINAL CORD (HCC): Primary | ICD-10-CM

## 2021-03-11 PROCEDURE — 99024 POSTOP FOLLOW-UP VISIT: CPT | Performed by: NEUROLOGICAL SURGERY

## 2021-03-11 RX ORDER — GABAPENTIN 300 MG/1
300 CAPSULE ORAL 3 TIMES DAILY
Qty: 90 CAPSULE | Refills: 3 | Status: SHIPPED | OUTPATIENT
Start: 2021-03-11 | End: 2021-04-12 | Stop reason: SDUPTHER

## 2021-03-11 NOTE — PROGRESS NOTES
Assessment/Plan:    No problem-specific Assessment & Plan notes found for this encounter  He is 3 months fro m a 360 fusion Xrays are stable  He reports minimal pain has some residual numbness in the fingertips  I reordered neurontin  Recommend f/u in 3months with CT  Sent referral to pain management  Reassured that some myelopathic symptoms will remain and that they can typically be managed conservatively     Diagnoses and all orders for this visit:    Edema, spinal cord ([address] )  -     Ambulatory referral to Pain Management; Future  -     gabapentin (NEURONTIN) 300 mg capsule; Take 1 capsule (300 mg total) by mouth 3 (three) times a day  -     CT spine cervical wo contrast; Future        I have spent 20 minutes with Patient  today in which greater than 50% of this time was spent in counseling/coordination of care regarding Diagnostic results, Prognosis, Risks and benefits of tx options, Intructions for management, Patient and family education, Importance of tx compliance, Risk factor reductions and Impressions  Subjective:      Patient ID: Ale Jeffers is a 64 y o  male  HPI    Patient is 3 months from 360 cervial fusion from central cord syndrome, notes no worsening but has bothersome numbness  Otherwise has some mild neck pain  Otherwise has no major complaints  The following portions of the patient's history were reviewed and updated as appropriate: allergies, current medications, past family history, past medical history, past social history, past surgical history and problem list     Review of Systems   Cardiovascular: Negative  Gastrointestinal: Negative  Genitourinary: Negative  Musculoskeletal: Positive for neck stiffness  Neurological: Positive for dizziness and numbness (bi/hands)  Hematological: Negative  Psychiatric/Behavioral: Negative          I have personally reviewed all aspects of the review of systems as documented    Objective:      /80   Pulse (!) 117 Temp 98 3 °F (36 8 °C) (Tympanic)   Resp 16   Ht 6' 2" (1 88 m)   Wt 88 9 kg (196 lb)   BMI 25 16 kg/m²          Physical Exam  Constitutional:       Appearance: Normal appearance  He is normal weight  Eyes:      General:         Right eye: No discharge  Left eye: No discharge  Extraocular Movements: Extraocular movements intact  Conjunctiva/sclera: Conjunctivae normal       Pupils: Pupils are equal, round, and reactive to light  Cardiovascular:      Rate and Rhythm: Normal rate and regular rhythm  Pulmonary:      Effort: Pulmonary effort is normal    Neurological:      Mental Status: He is alert and oriented to person, place, and time  Mental status is at baseline  Cranial Nerves: No cranial nerve deficit  Psychiatric:         Mood and Affect: Mood normal          Thought Content:  Thought content normal        incisions well healed

## 2021-03-24 ENCOUNTER — PATIENT OUTREACH (OUTPATIENT)
Dept: FAMILY MEDICINE CLINIC | Facility: CLINIC | Age: 61
End: 2021-03-24

## 2021-03-24 NOTE — PROGRESS NOTES
Outreach to patient  Miss appointment yesterday  LVM to return CM call  Contact information provided

## 2021-04-08 ENCOUNTER — PATIENT OUTREACH (OUTPATIENT)
Dept: FAMILY MEDICINE CLINIC | Facility: CLINIC | Age: 61
End: 2021-04-08

## 2021-04-08 NOTE — PROGRESS NOTES
Received phone call from patient  States he needs an appointment since he is running out of medications  Appointment scheduled for 4/12 at 11 am  Pt notified  Agreeable to this time and date  CM will meet with patient at that time

## 2021-04-12 ENCOUNTER — OFFICE VISIT (OUTPATIENT)
Dept: FAMILY MEDICINE CLINIC | Facility: CLINIC | Age: 61
End: 2021-04-12
Payer: MEDICARE

## 2021-04-12 ENCOUNTER — PATIENT OUTREACH (OUTPATIENT)
Dept: FAMILY MEDICINE CLINIC | Facility: CLINIC | Age: 61
End: 2021-04-12

## 2021-04-12 VITALS
WEIGHT: 182.3 LBS | TEMPERATURE: 97.3 F | BODY MASS INDEX: 23.4 KG/M2 | SYSTOLIC BLOOD PRESSURE: 136 MMHG | RESPIRATION RATE: 22 BRPM | OXYGEN SATURATION: 95 % | HEIGHT: 74 IN | HEART RATE: 102 BPM | DIASTOLIC BLOOD PRESSURE: 82 MMHG

## 2021-04-12 DIAGNOSIS — E11.00 TYPE 2 DIABETES MELLITUS WITH HYPEROSMOLARITY WITHOUT COMA, WITHOUT LONG-TERM CURRENT USE OF INSULIN (HCC): Chronic | ICD-10-CM

## 2021-04-12 DIAGNOSIS — F10.10 ALCOHOL ABUSE: Chronic | ICD-10-CM

## 2021-04-12 DIAGNOSIS — I10 ESSENTIAL (PRIMARY) HYPERTENSION: Chronic | ICD-10-CM

## 2021-04-12 DIAGNOSIS — R25.2 MUSCLE CRAMP: Primary | ICD-10-CM

## 2021-04-12 DIAGNOSIS — Z72.89 ALCOHOL PROBLEM DRINKING: Primary | ICD-10-CM

## 2021-04-12 DIAGNOSIS — E78.2 MIXED HYPERLIPIDEMIA: Chronic | ICD-10-CM

## 2021-04-12 DIAGNOSIS — G95.19 EDEMA, SPINAL CORD (HCC): ICD-10-CM

## 2021-04-12 PROCEDURE — 99214 OFFICE O/P EST MOD 30 MIN: CPT | Performed by: FAMILY MEDICINE

## 2021-04-12 RX ORDER — GABAPENTIN 300 MG/1
300 CAPSULE ORAL 3 TIMES DAILY
Qty: 90 CAPSULE | Refills: 3 | Status: SHIPPED | OUTPATIENT
Start: 2021-04-12 | End: 2021-09-15

## 2021-04-12 RX ORDER — RANOLAZINE 500 MG/1
500 TABLET, EXTENDED RELEASE ORAL EVERY 12 HOURS SCHEDULED
Qty: 60 TABLET | Refills: 3 | Status: SHIPPED | OUTPATIENT
Start: 2021-04-12 | End: 2021-09-09

## 2021-04-12 RX ORDER — METHOCARBAMOL 750 MG/1
750 TABLET, FILM COATED ORAL 4 TIMES DAILY PRN
Qty: 30 TABLET | Refills: 0 | Status: SHIPPED | OUTPATIENT
Start: 2021-04-12 | End: 2021-04-12

## 2021-04-12 NOTE — PROGRESS NOTES
Met with patient during PCP visit  Pts eyes are red and irritated  Pt visibly shaking  Pt has history of ETOH abuse  Pt states he has been drinking  Once he stops, he starts to shake so he drinks again  Discussed detox as an option for treatment  States he has tried multiple times in the past  Longest he was sober was 3 years  Pt agreeable to social work outreach to assist in finding detox program  Referral placed and in basket messages sen to [facility]   CM discussed pts request for PT and OT  Continues to have issues since neck surgery  Pt initially agreeable to home PT and OT through [facility] (OUTPATIENT C[facility])  Pt called later in the afternoon  States apartment is a mess and does not believe he should have someone come to his home a this time  Agreeable to have outreach from  to address detox options and then would be willing to work on getting PT and OT services

## 2021-04-12 NOTE — PROGRESS NOTES
Assessment/Plan:    Hypertension  Stable, continue current regimen of Lopressor 25mg PO q12h, lisinopril 10mg PO daily, and amlodipine 5mg PO daily  Follow up BMP  HLD  No recent lipid panel, will follow up repeat  Continue pravastatin 40mg PO daily    Type 2 diabetes mellitus  Follow up repeat A1c, last one well controlled in December 2020 at 5 3  On no antihyperglycemic agents at this time  Alcohol abuse  Spoke with patient extensively about the importance of staying sober, especially with his recent surgery  Patient does express interest in inpatient detox as this has worked for him before  Consulted case management who also spoke with patient during this visit  Edema, spinal cord  Case management also consulted to set up home PT/OT as patient does not have transportation to attend outpatient PT/OT  Subjective:      Patient ID: Sotero Alvarez is a 63 y o  male  HPI  This is a 63year old male presenting for follow up for hypertension  Patient is s/p 360 fusion and had recent follow up with neurosurgery 1 month ago, when his neck brace was removed  Patient was referred to physical therapy, however has not been going because he does not have transportation  Patient also admits he started drinking again  He states that anytime he stops drinking, he gets the shakes and drinks to stop them  The following portions of the patient's history were reviewed and updated as appropriate: allergies, current medications, past family history, past medical history, past social history, past surgical history and problem list     Review of Systems  Negative except per HPI    Objective:  /82 (BP Location: Left arm, Patient Position: Sitting, Cuff Size: Large)   Pulse 102   Temp (!) 97 3 °F (36 3 °C) (Tympanic)   Resp 22   Ht 6' 2" (1 88 m)   Wt 82 7 kg (182 lb 4 8 oz)   SpO2 95%   BMI 23 41 kg/m²      Physical Exam  Vitals signs reviewed     Constitutional:       General: He is not in acute distress  Appearance: He is well-developed  HENT:      Head: Normocephalic and atraumatic  Eyes:      Conjunctiva/sclera:      Right eye: Right conjunctiva is injected  Left eye: Left conjunctiva is injected  Cardiovascular:      Rate and Rhythm: Normal rate and regular rhythm  Heart sounds: Normal heart sounds  No murmur  No friction rub  No gallop  Pulmonary:      Effort: Pulmonary effort is normal  No respiratory distress  Breath sounds: Normal breath sounds  No wheezing or rales  Abdominal:      General: Bowel sounds are normal  There is no distension  Palpations: Abdomen is soft  Tenderness: There is no abdominal tenderness  There is no guarding or rebound  Neurological:      Mental Status: He is alert and oriented to person, place, and time  Sensory: Sensory deficit present  Motor: Tremor present

## 2021-04-14 ENCOUNTER — PATIENT OUTREACH (OUTPATIENT)
Dept: FAMILY MEDICINE CLINIC | Facility: CLINIC | Age: 61
End: 2021-04-14

## 2021-04-14 ENCOUNTER — TELEPHONE (OUTPATIENT)
Dept: FAMILY MEDICINE CLINIC | Facility: CLINIC | Age: 61
End: 2021-04-14

## 2021-04-15 ENCOUNTER — PATIENT OUTREACH (OUTPATIENT)
Dept: FAMILY MEDICINE CLINIC | Facility: CLINIC | Age: 61
End: 2021-04-15

## 2021-04-15 NOTE — PROGRESS NOTES
MERT RAMÍREZ received referral from CHRISTIE Fuentes Danielito to assist patient with Drug and alcohol resources  MERT RAMÍREZ received call back from patient ([phone])  MERT RAMÍREZ introduced MERT RAMÍREZ role  MERT RAMÍREZ confirmed patient's contact information, emergency contacts and address  MERT RAMÍREZ completed assessment with patient  Pt is single, disabled and receives about $1,200 in [facility] a month  Patient has a 10th grade education level  Patient receives food stamps, previously $15 a month but has since increased to $200 a month due to pandemic  Pt confirmed that he always has enough food in his home  Pt stated that he utilized a RW after his recent surgery but now only utilizes it as needed  Pt is independent with all ADLs  Pt stated that CHRISTIE Fuentes Danielito is assisting patient in arranging PT/ OT  Patient considers his son and friends his support and talks to them every couple of days  Patient stated that he is not a , no legal issues and no previous MH hx  MERT RAMÍREZ confirmed patient's address  Patient stated no barriers to obtaining medications  Patient stated that he is able to afford all his bills  Patient takes the bus or his son transports him as needed  Patient stated that he drinks a quart of vodka a day and is interested in detox  However, patient stated he is not ready today and will need a few days to sort a few things out and clean his home  MERT RAMÍREZ discussed making a referral to the [facility] program, pt agreeable  MERT RAMÍREZ called Keshav Rosales from [employer] ([phone])  Keshav Rosales confirmed that she can assist patient in finding a detox center that works best for him  Keshav Rosales is requesting that MERT RAMÍREZ email her patient's information to follow up with patient  MERT RAMÍREZ called patient and discussed above  Patient agreeable  MERT RAMÍREZ emailed Keshav Rosales ([email or web address]) patient's contact information  MERT RAMÍREZ to continue to follow up with patient and Keshav Rosales regarding

## 2021-04-21 ENCOUNTER — APPOINTMENT (EMERGENCY)
Dept: RADIOLOGY | Facility: HOSPITAL | Age: 61
DRG: 871 | End: 2021-04-21
Payer: MEDICARE

## 2021-04-21 ENCOUNTER — HOSPITAL ENCOUNTER (INPATIENT)
Facility: HOSPITAL | Age: 61
LOS: 5 days | Discharge: HOME | DRG: 871 | End: 2021-04-26
Attending: EMERGENCY MEDICINE | Admitting: ANESTHESIOLOGY
Payer: MEDICARE

## 2021-04-21 ENCOUNTER — PATIENT OUTREACH (OUTPATIENT)
Dept: FAMILY MEDICINE CLINIC | Facility: CLINIC | Age: 61
End: 2021-04-21

## 2021-04-21 ENCOUNTER — APPOINTMENT (INPATIENT)
Dept: RADIOLOGY | Facility: HOSPITAL | Age: 61
DRG: 871 | End: 2021-04-21
Payer: MEDICARE

## 2021-04-21 DIAGNOSIS — A41.9 SEPTIC SHOCK (HCC): Primary | ICD-10-CM

## 2021-04-21 DIAGNOSIS — R53.81 PHYSICAL DECONDITIONING: ICD-10-CM

## 2021-04-21 DIAGNOSIS — M48.02 CERVICAL SPINAL STENOSIS: Chronic | ICD-10-CM

## 2021-04-21 DIAGNOSIS — R65.21 SEPTIC SHOCK (HCC): Primary | ICD-10-CM

## 2021-04-21 DIAGNOSIS — M62.82 RHABDOMYOLYSIS: ICD-10-CM

## 2021-04-21 DIAGNOSIS — I10 ESSENTIAL (PRIMARY) HYPERTENSION: Chronic | ICD-10-CM

## 2021-04-21 DIAGNOSIS — R57.1 HYPOVOLEMIC SHOCK (HCC): ICD-10-CM

## 2021-04-21 DIAGNOSIS — E86.0 SEVERE DEHYDRATION: ICD-10-CM

## 2021-04-21 DIAGNOSIS — N18.9 ACUTE ON CHRONIC KIDNEY FAILURE (HCC): ICD-10-CM

## 2021-04-21 DIAGNOSIS — F10.929 ALCOHOL INTOXICATION (HCC): ICD-10-CM

## 2021-04-21 DIAGNOSIS — N17.9 AKI (ACUTE KIDNEY INJURY) (HCC): ICD-10-CM

## 2021-04-21 DIAGNOSIS — F10.10 ALCOHOL ABUSE: Chronic | ICD-10-CM

## 2021-04-21 DIAGNOSIS — N17.9 ACUTE ON CHRONIC KIDNEY FAILURE (HCC): ICD-10-CM

## 2021-04-21 DIAGNOSIS — D69.6 THROMBOCYTOPENIA (HCC): ICD-10-CM

## 2021-04-21 PROBLEM — G92.8 TOXIC METABOLIC ENCEPHALOPATHY: Status: ACTIVE | Noted: 2021-04-21

## 2021-04-21 PROBLEM — E87.2 INCREASED ANION GAP METABOLIC ACIDOSIS: Status: ACTIVE | Noted: 2021-04-21

## 2021-04-21 PROBLEM — R74.01 TRANSAMINITIS: Status: ACTIVE | Noted: 2021-04-21

## 2021-04-21 PROBLEM — E87.2 LACTIC ACIDOSIS: Status: RESOLVED | Noted: 2019-06-07 | Resolved: 2021-04-21

## 2021-04-21 PROBLEM — E87.20 LACTIC ACIDOSIS: Status: RESOLVED | Noted: 2019-06-07 | Resolved: 2021-04-21

## 2021-04-21 PROBLEM — E83.51 HYPOCALCEMIA: Status: ACTIVE | Noted: 2021-04-21

## 2021-04-21 PROBLEM — E87.29 INCREASED ANION GAP METABOLIC ACIDOSIS: Status: ACTIVE | Noted: 2021-04-21

## 2021-04-21 LAB
ALBUMIN SERPL BCP-MCNC: 3 G/DL (ref 3.5–5)
ALP SERPL-CCNC: 191 U/L (ref 46–116)
ALT SERPL W P-5'-P-CCNC: 96 U/L (ref 12–78)
AMMONIA PLAS-SCNC: <10 UMOL/L (ref 11–35)
ANION GAP SERPL CALCULATED.3IONS-SCNC: 15 MMOL/L (ref 4–13)
ANION GAP SERPL CALCULATED.3IONS-SCNC: 17 MMOL/L (ref 4–13)
APTT PPP: 32 SECONDS (ref 23–37)
ARTERIAL PATENCY WRIST A: YES
AST SERPL W P-5'-P-CCNC: 217 U/L (ref 5–45)
BACTERIA UR QL AUTO: NORMAL /HPF
BASE EXCESS BLDA CALC-SCNC: -10.3 MMOL/L
BASOPHILS # BLD AUTO: 0.04 THOUSANDS/ΜL (ref 0–0.1)
BASOPHILS NFR BLD AUTO: 1 % (ref 0–1)
BILIRUB SERPL-MCNC: 1.27 MG/DL (ref 0.2–1)
BILIRUB UR QL STRIP: NEGATIVE
BODY TEMPERATURE: 98.9 DEGREES FEHRENHEIT
BUN SERPL-MCNC: 36 MG/DL (ref 5–25)
BUN SERPL-MCNC: 42 MG/DL (ref 5–25)
CA-I BLD-SCNC: 0.91 MMOL/L (ref 1.12–1.32)
CALCIUM ALBUM COR SERPL-MCNC: 8.2 MG/DL (ref 8.3–10.1)
CALCIUM SERPL-MCNC: 6.6 MG/DL (ref 8.3–10.1)
CALCIUM SERPL-MCNC: 7.4 MG/DL (ref 8.3–10.1)
CHLORIDE SERPL-SCNC: 92 MMOL/L (ref 100–108)
CHLORIDE SERPL-SCNC: 98 MMOL/L (ref 100–108)
CK MB SERPL-MCNC: 1 % (ref 0–2.5)
CK MB SERPL-MCNC: 5.1 NG/ML (ref 0–5)
CK SERPL-CCNC: 501 U/L (ref 39–308)
CLARITY UR: CLEAR
CO2 SERPL-SCNC: 18 MMOL/L (ref 21–32)
CO2 SERPL-SCNC: 20 MMOL/L (ref 21–32)
COLOR UR: ABNORMAL
CREAT SERPL-MCNC: 1.6 MG/DL (ref 0.6–1.3)
CREAT SERPL-MCNC: 1.97 MG/DL (ref 0.6–1.3)
EOSINOPHIL # BLD AUTO: 0.03 THOUSAND/ΜL (ref 0–0.61)
EOSINOPHIL NFR BLD AUTO: 0 % (ref 0–6)
ERYTHROCYTE [DISTWIDTH] IN BLOOD BY AUTOMATED COUNT: 14.3 % (ref 11.6–15.1)
ETHANOL SERPL-MCNC: 210 MG/DL (ref 0–3)
FLUAV RNA RESP QL NAA+PROBE: NEGATIVE
FLUBV RNA RESP QL NAA+PROBE: NEGATIVE
GFR SERPL CREATININE-BSD FRML MDRD: 36 ML/MIN/1.73SQ M
GFR SERPL CREATININE-BSD FRML MDRD: 46 ML/MIN/1.73SQ M
GLUCOSE SERPL-MCNC: 82 MG/DL (ref 65–140)
GLUCOSE SERPL-MCNC: 86 MG/DL (ref 65–140)
GLUCOSE SERPL-MCNC: 94 MG/DL (ref 65–140)
GLUCOSE UR STRIP-MCNC: NEGATIVE MG/DL
HCO3 BLDA-SCNC: 15 MMOL/L (ref 22–28)
HCT VFR BLD AUTO: 30.7 % (ref 36.5–49.3)
HGB BLD-MCNC: 10.3 G/DL (ref 12–17)
HGB UR QL STRIP.AUTO: ABNORMAL
IMM GRANULOCYTES # BLD AUTO: 0.12 THOUSAND/UL (ref 0–0.2)
IMM GRANULOCYTES NFR BLD AUTO: 1 % (ref 0–2)
INR PPP: 1.02 (ref 0.84–1.19)
KETONES UR STRIP-MCNC: NEGATIVE MG/DL
LACTATE SERPL-SCNC: 1.8 MMOL/L (ref 0.5–2)
LACTATE SERPL-SCNC: 2.9 MMOL/L (ref 0.5–2)
LACTATE SERPL-SCNC: 7.1 MMOL/L (ref 0.5–2)
LEUKOCYTE ESTERASE UR QL STRIP: NEGATIVE
LIPASE SERPL-CCNC: 128 U/L (ref 73–393)
LYMPHOCYTES # BLD AUTO: 0.52 THOUSANDS/ΜL (ref 0.6–4.47)
LYMPHOCYTES NFR BLD AUTO: 6 % (ref 14–44)
MAGNESIUM SERPL-MCNC: 0.8 MG/DL (ref 1.6–2.6)
MAGNESIUM SERPL-MCNC: 1.4 MG/DL (ref 1.6–2.6)
MCH RBC QN AUTO: 32.2 PG (ref 26.8–34.3)
MCHC RBC AUTO-ENTMCNC: 33.6 G/DL (ref 31.4–37.4)
MCV RBC AUTO: 96 FL (ref 82–98)
MONOCYTES # BLD AUTO: 0.76 THOUSAND/ΜL (ref 0.17–1.22)
MONOCYTES NFR BLD AUTO: 9 % (ref 4–12)
NASAL CANNULA: 4
NEUTROPHILS # BLD AUTO: 7.17 THOUSANDS/ΜL (ref 1.85–7.62)
NEUTS SEG NFR BLD AUTO: 83 % (ref 43–75)
NITRITE UR QL STRIP: NEGATIVE
NON-SQ EPI CELLS URNS QL MICRO: NORMAL /HPF
NRBC BLD AUTO-RTO: 0 /100 WBCS
O2 CT BLDA-SCNC: 13.6 ML/DL (ref 16–23)
OXYHGB MFR BLDA: 94.2 % (ref 94–97)
PCO2 BLDA: 31.1 MM HG (ref 36–44)
PCO2 TEMP ADJ BLDA: 31.4 MM HG (ref 36–44)
PH BLD: 7.3 [PH] (ref 7.35–7.45)
PH BLDA: 7.3 [PH] (ref 7.35–7.45)
PH UR STRIP.AUTO: 5.5 [PH]
PHOSPHATE SERPL-MCNC: 2.2 MG/DL (ref 2.7–4.5)
PLATELET # BLD AUTO: 28 THOUSANDS/UL (ref 149–390)
PMV BLD AUTO: 14.1 FL (ref 8.9–12.7)
PO2 BLD: 79.9 MM HG (ref 75–129)
PO2 BLDA: 78.9 MM HG (ref 75–129)
POTASSIUM SERPL-SCNC: 3.9 MMOL/L (ref 3.5–5.3)
POTASSIUM SERPL-SCNC: 4.2 MMOL/L (ref 3.5–5.3)
PROT SERPL-MCNC: 6.5 G/DL (ref 6.4–8.2)
PROT UR STRIP-MCNC: ABNORMAL MG/DL
PROTHROMBIN TIME: 13.3 SECONDS (ref 11.6–14.5)
RBC # BLD AUTO: 3.2 MILLION/UL (ref 3.88–5.62)
RBC #/AREA URNS AUTO: NORMAL /HPF
RSV RNA RESP QL NAA+PROBE: NEGATIVE
SARS-COV-2 RNA RESP QL NAA+PROBE: NEGATIVE
SODIUM SERPL-SCNC: 129 MMOL/L (ref 136–145)
SODIUM SERPL-SCNC: 131 MMOL/L (ref 136–145)
SP GR UR STRIP.AUTO: <=1.005 (ref 1–1.03)
SPECIMEN SOURCE: ABNORMAL
TROPONIN I SERPL-MCNC: 0.03 NG/ML
TSH SERPL DL<=0.05 MIU/L-ACNC: 1.16 UIU/ML (ref 0.36–3.74)
UROBILINOGEN UR QL STRIP.AUTO: 0.2 E.U./DL
WBC # BLD AUTO: 8.64 THOUSAND/UL (ref 4.31–10.16)
WBC #/AREA URNS AUTO: NORMAL /HPF

## 2021-04-21 PROCEDURE — 87040 BLOOD CULTURE FOR BACTERIA: CPT | Performed by: EMERGENCY MEDICINE

## 2021-04-21 PROCEDURE — 81001 URINALYSIS AUTO W/SCOPE: CPT | Performed by: NURSE PRACTITIONER

## 2021-04-21 PROCEDURE — 82948 REAGENT STRIP/BLOOD GLUCOSE: CPT

## 2021-04-21 PROCEDURE — 74177 CT ABD & PELVIS W/CONTRAST: CPT

## 2021-04-21 PROCEDURE — 36556 INSERT NON-TUNNEL CV CATH: CPT | Performed by: NURSE PRACTITIONER

## 2021-04-21 PROCEDURE — 83690 ASSAY OF LIPASE: CPT | Performed by: EMERGENCY MEDICINE

## 2021-04-21 PROCEDURE — 70450 CT HEAD/BRAIN W/O DYE: CPT

## 2021-04-21 PROCEDURE — 83735 ASSAY OF MAGNESIUM: CPT | Performed by: ANESTHESIOLOGY

## 2021-04-21 PROCEDURE — 99285 EMERGENCY DEPT VISIT HI MDM: CPT

## 2021-04-21 PROCEDURE — 0241U HB NFCT DS VIR RESP RNA 4 TRGT: CPT | Performed by: EMERGENCY MEDICINE

## 2021-04-21 PROCEDURE — 84100 ASSAY OF PHOSPHORUS: CPT | Performed by: ANESTHESIOLOGY

## 2021-04-21 PROCEDURE — 85025 COMPLETE CBC W/AUTO DIFF WBC: CPT | Performed by: EMERGENCY MEDICINE

## 2021-04-21 PROCEDURE — 71045 X-RAY EXAM CHEST 1 VIEW: CPT

## 2021-04-21 PROCEDURE — 02HV33Z INSERTION OF INFUSION DEVICE INTO SUPERIOR VENA CAVA, PERCUTANEOUS APPROACH: ICD-10-PCS | Performed by: FAMILY MEDICINE

## 2021-04-21 PROCEDURE — 36620 INSERTION CATHETER ARTERY: CPT | Performed by: NURSE PRACTITIONER

## 2021-04-21 PROCEDURE — 03HY32Z INSERTION OF MONITORING DEVICE INTO UPPER ARTERY, PERCUTANEOUS APPROACH: ICD-10-PCS | Performed by: FAMILY MEDICINE

## 2021-04-21 PROCEDURE — 96375 TX/PRO/DX INJ NEW DRUG ADDON: CPT

## 2021-04-21 PROCEDURE — 4A133J1 MONITORING OF ARTERIAL PULSE, PERIPHERAL, PERCUTANEOUS APPROACH: ICD-10-PCS | Performed by: FAMILY MEDICINE

## 2021-04-21 PROCEDURE — 93005 ELECTROCARDIOGRAM TRACING: CPT

## 2021-04-21 PROCEDURE — 82805 BLOOD GASES W/O2 SATURATION: CPT | Performed by: NURSE PRACTITIONER

## 2021-04-21 PROCEDURE — 83605 ASSAY OF LACTIC ACID: CPT | Performed by: ANESTHESIOLOGY

## 2021-04-21 PROCEDURE — 84443 ASSAY THYROID STIM HORMONE: CPT | Performed by: EMERGENCY MEDICINE

## 2021-04-21 PROCEDURE — 85610 PROTHROMBIN TIME: CPT | Performed by: EMERGENCY MEDICINE

## 2021-04-21 PROCEDURE — 36415 COLL VENOUS BLD VENIPUNCTURE: CPT | Performed by: EMERGENCY MEDICINE

## 2021-04-21 PROCEDURE — 84145 PROCALCITONIN (PCT): CPT | Performed by: NURSE PRACTITIONER

## 2021-04-21 PROCEDURE — 87505 NFCT AGENT DETECTION GI: CPT | Performed by: NURSE PRACTITIONER

## 2021-04-21 PROCEDURE — 80048 BASIC METABOLIC PNL TOTAL CA: CPT | Performed by: NURSE PRACTITIONER

## 2021-04-21 PROCEDURE — 96365 THER/PROPH/DIAG IV INF INIT: CPT

## 2021-04-21 PROCEDURE — 73560 X-RAY EXAM OF KNEE 1 OR 2: CPT

## 2021-04-21 PROCEDURE — NC001 PR NO CHARGE: Performed by: NURSE PRACTITIONER

## 2021-04-21 PROCEDURE — 82550 ASSAY OF CK (CPK): CPT | Performed by: EMERGENCY MEDICINE

## 2021-04-21 PROCEDURE — 99291 CRITICAL CARE FIRST HOUR: CPT | Performed by: EMERGENCY MEDICINE

## 2021-04-21 PROCEDURE — 82533 TOTAL CORTISOL: CPT | Performed by: NURSE PRACTITIONER

## 2021-04-21 PROCEDURE — 4A133B1 MONITORING OF ARTERIAL PRESSURE, PERIPHERAL, PERCUTANEOUS APPROACH: ICD-10-PCS | Performed by: FAMILY MEDICINE

## 2021-04-21 PROCEDURE — 99292 CRITICAL CARE ADDL 30 MIN: CPT | Performed by: EMERGENCY MEDICINE

## 2021-04-21 PROCEDURE — 87081 CULTURE SCREEN ONLY: CPT | Performed by: ANESTHESIOLOGY

## 2021-04-21 PROCEDURE — 82553 CREATINE MB FRACTION: CPT | Performed by: EMERGENCY MEDICINE

## 2021-04-21 PROCEDURE — 83735 ASSAY OF MAGNESIUM: CPT | Performed by: EMERGENCY MEDICINE

## 2021-04-21 PROCEDURE — 99291 CRITICAL CARE FIRST HOUR: CPT | Performed by: NURSE PRACTITIONER

## 2021-04-21 PROCEDURE — 82330 ASSAY OF CALCIUM: CPT | Performed by: NURSE PRACTITIONER

## 2021-04-21 PROCEDURE — 84484 ASSAY OF TROPONIN QUANT: CPT | Performed by: EMERGENCY MEDICINE

## 2021-04-21 PROCEDURE — 82140 ASSAY OF AMMONIA: CPT | Performed by: NURSE PRACTITIONER

## 2021-04-21 PROCEDURE — 72125 CT NECK SPINE W/O DYE: CPT

## 2021-04-21 PROCEDURE — 85730 THROMBOPLASTIN TIME PARTIAL: CPT | Performed by: EMERGENCY MEDICINE

## 2021-04-21 PROCEDURE — 80053 COMPREHEN METABOLIC PANEL: CPT | Performed by: EMERGENCY MEDICINE

## 2021-04-21 PROCEDURE — 83605 ASSAY OF LACTIC ACID: CPT | Performed by: EMERGENCY MEDICINE

## 2021-04-21 PROCEDURE — 96361 HYDRATE IV INFUSION ADD-ON: CPT

## 2021-04-21 PROCEDURE — 87493 C DIFF AMPLIFIED PROBE: CPT | Performed by: NURSE PRACTITIONER

## 2021-04-21 PROCEDURE — 76937 US GUIDE VASCULAR ACCESS: CPT | Performed by: NURSE PRACTITIONER

## 2021-04-21 PROCEDURE — 71260 CT THORAX DX C+: CPT

## 2021-04-21 PROCEDURE — 82077 ASSAY SPEC XCP UR&BREATH IA: CPT | Performed by: EMERGENCY MEDICINE

## 2021-04-21 RX ORDER — ONDANSETRON 2 MG/ML
4 INJECTION INTRAMUSCULAR; INTRAVENOUS EVERY 6 HOURS PRN
Status: DISCONTINUED | OUTPATIENT
Start: 2021-04-21 | End: 2021-04-26 | Stop reason: HOSPADM

## 2021-04-21 RX ORDER — TAMSULOSIN HYDROCHLORIDE 0.4 MG/1
0.4 CAPSULE ORAL DAILY
Status: DISCONTINUED | OUTPATIENT
Start: 2021-04-21 | End: 2021-04-26 | Stop reason: HOSPADM

## 2021-04-21 RX ORDER — SODIUM CHLORIDE, SODIUM GLUCONATE, SODIUM ACETATE, POTASSIUM CHLORIDE, MAGNESIUM CHLORIDE, SODIUM PHOSPHATE, DIBASIC, AND POTASSIUM PHOSPHATE .53; .5; .37; .037; .03; .012; .00082 G/100ML; G/100ML; G/100ML; G/100ML; G/100ML; G/100ML; G/100ML
50 INJECTION, SOLUTION INTRAVENOUS CONTINUOUS
Status: DISCONTINUED | OUTPATIENT
Start: 2021-04-21 | End: 2021-04-22

## 2021-04-21 RX ORDER — MAGNESIUM SULFATE HEPTAHYDRATE 40 MG/ML
2 INJECTION, SOLUTION INTRAVENOUS ONCE
Status: COMPLETED | OUTPATIENT
Start: 2021-04-21 | End: 2021-04-21

## 2021-04-21 RX ORDER — ONDANSETRON 2 MG/ML
4 INJECTION INTRAMUSCULAR; INTRAVENOUS ONCE
Status: COMPLETED | OUTPATIENT
Start: 2021-04-21 | End: 2021-04-21

## 2021-04-21 RX ORDER — ACETAMINOPHEN 325 MG/1
650 TABLET ORAL EVERY 6 HOURS PRN
Status: DISCONTINUED | OUTPATIENT
Start: 2021-04-21 | End: 2021-04-26 | Stop reason: HOSPADM

## 2021-04-21 RX ORDER — GABAPENTIN 300 MG/1
300 CAPSULE ORAL 3 TIMES DAILY
Status: DISCONTINUED | OUTPATIENT
Start: 2021-04-21 | End: 2021-04-26 | Stop reason: HOSPADM

## 2021-04-21 RX ORDER — PHENOBARBITAL SODIUM 65 MG/ML
130 INJECTION INTRAMUSCULAR ONCE
Status: DISCONTINUED | OUTPATIENT
Start: 2021-04-21 | End: 2021-04-21

## 2021-04-21 RX ORDER — ALBUMIN, HUMAN INJ 5% 5 %
12.5 SOLUTION INTRAVENOUS ONCE
Status: COMPLETED | OUTPATIENT
Start: 2021-04-21 | End: 2021-04-21

## 2021-04-21 RX ORDER — FOLIC ACID 1 MG/1
1 TABLET ORAL DAILY
Status: DISCONTINUED | OUTPATIENT
Start: 2021-04-21 | End: 2021-04-26 | Stop reason: HOSPADM

## 2021-04-21 RX ORDER — SODIUM CHLORIDE, SODIUM GLUCONATE, SODIUM ACETATE, POTASSIUM CHLORIDE, MAGNESIUM CHLORIDE, SODIUM PHOSPHATE, DIBASIC, AND POTASSIUM PHOSPHATE .53; .5; .37; .037; .03; .012; .00082 G/100ML; G/100ML; G/100ML; G/100ML; G/100ML; G/100ML; G/100ML
1000 INJECTION, SOLUTION INTRAVENOUS ONCE
Status: COMPLETED | OUTPATIENT
Start: 2021-04-21 | End: 2021-04-21

## 2021-04-21 RX ORDER — CALCIUM GLUCONATE 20 MG/ML
2 INJECTION, SOLUTION INTRAVENOUS ONCE
Status: COMPLETED | OUTPATIENT
Start: 2021-04-21 | End: 2021-04-21

## 2021-04-21 RX ORDER — LANOLIN ALCOHOL/MO/W.PET/CERES
100 CREAM (GRAM) TOPICAL DAILY
Status: DISCONTINUED | OUTPATIENT
Start: 2021-04-21 | End: 2021-04-26 | Stop reason: HOSPADM

## 2021-04-21 RX ORDER — VANCOMYCIN HYDROCHLORIDE 1 G/200ML
12.5 INJECTION, SOLUTION INTRAVENOUS EVERY 12 HOURS
Status: DISCONTINUED | OUTPATIENT
Start: 2021-04-22 | End: 2021-04-22

## 2021-04-21 RX ORDER — MAGNESIUM SULFATE HEPTAHYDRATE 40 MG/ML
4 INJECTION, SOLUTION INTRAVENOUS ONCE
Status: DISCONTINUED | OUTPATIENT
Start: 2021-04-21 | End: 2021-04-21

## 2021-04-21 RX ORDER — FLUTICASONE FUROATE AND VILANTEROL 200; 25 UG/1; UG/1
1 POWDER RESPIRATORY (INHALATION) DAILY
Status: DISCONTINUED | OUTPATIENT
Start: 2021-04-21 | End: 2021-04-26 | Stop reason: HOSPADM

## 2021-04-21 RX ORDER — CALCIUM GLUCONATE 20 MG/ML
1 INJECTION, SOLUTION INTRAVENOUS ONCE
Status: COMPLETED | OUTPATIENT
Start: 2021-04-21 | End: 2021-04-21

## 2021-04-21 RX ORDER — NICOTINE 21 MG/24HR
21 PATCH, TRANSDERMAL 24 HOURS TRANSDERMAL DAILY
Status: DISCONTINUED | OUTPATIENT
Start: 2021-04-21 | End: 2021-04-26 | Stop reason: HOSPADM

## 2021-04-21 RX ORDER — SODIUM CHLORIDE 9 MG/ML
250 INJECTION, SOLUTION INTRAVENOUS CONTINUOUS
Status: DISCONTINUED | OUTPATIENT
Start: 2021-04-21 | End: 2021-04-21

## 2021-04-21 RX ORDER — PRAVASTATIN SODIUM 40 MG
40 TABLET ORAL
Status: DISCONTINUED | OUTPATIENT
Start: 2021-04-21 | End: 2021-04-21

## 2021-04-21 RX ORDER — ALBUTEROL SULFATE 90 UG/1
2 AEROSOL, METERED RESPIRATORY (INHALATION) EVERY 4 HOURS PRN
Status: DISCONTINUED | OUTPATIENT
Start: 2021-04-21 | End: 2021-04-26 | Stop reason: HOSPADM

## 2021-04-21 RX ADMIN — ONDANSETRON 4 MG: 2 INJECTION INTRAMUSCULAR; INTRAVENOUS at 11:34

## 2021-04-21 RX ADMIN — SODIUM CHLORIDE 1000 ML: 0.9 INJECTION, SOLUTION INTRAVENOUS at 11:27

## 2021-04-21 RX ADMIN — CALCIUM GLUCONATE 1 G: 20 INJECTION, SOLUTION INTRAVENOUS at 20:01

## 2021-04-21 RX ADMIN — THIAMINE HCL TAB 100 MG 100 MG: 100 TAB at 16:27

## 2021-04-21 RX ADMIN — IOHEXOL 100 ML: 350 INJECTION, SOLUTION INTRAVENOUS at 12:30

## 2021-04-21 RX ADMIN — CALCIUM GLUCONATE 2 G: 20 INJECTION, SOLUTION INTRAVENOUS at 20:27

## 2021-04-21 RX ADMIN — MAGNESIUM SULFATE HEPTAHYDRATE 2 G: 40 INJECTION, SOLUTION INTRAVENOUS at 16:29

## 2021-04-21 RX ADMIN — SODIUM CHLORIDE, SODIUM LACTATE, POTASSIUM CHLORIDE, AND CALCIUM CHLORIDE 1000 ML: .6; .31; .03; .02 INJECTION, SOLUTION INTRAVENOUS at 13:08

## 2021-04-21 RX ADMIN — SODIUM CHLORIDE, SODIUM GLUCONATE, SODIUM ACETATE, POTASSIUM CHLORIDE, MAGNESIUM CHLORIDE, SODIUM PHOSPHATE, DIBASIC, AND POTASSIUM PHOSPHATE 50 ML/HR: .53; .5; .37; .037; .03; .012; .00082 INJECTION, SOLUTION INTRAVENOUS at 15:11

## 2021-04-21 RX ADMIN — METRONIDAZOLE 500 MG: 500 INJECTION, SOLUTION INTRAVENOUS at 21:44

## 2021-04-21 RX ADMIN — MAGNESIUM SULFATE HEPTAHYDRATE 2 G: 40 INJECTION, SOLUTION INTRAVENOUS at 12:47

## 2021-04-21 RX ADMIN — CEFEPIME HYDROCHLORIDE 2000 MG: 2 INJECTION, POWDER, FOR SOLUTION INTRAVENOUS at 13:17

## 2021-04-21 RX ADMIN — FOLIC ACID 1 MG: 1 TABLET ORAL at 16:26

## 2021-04-21 RX ADMIN — ALBUMIN (HUMAN) 12.5 G: 12.5 INJECTION, SOLUTION INTRAVENOUS at 18:07

## 2021-04-21 RX ADMIN — MAGNESIUM SULFATE HEPTAHYDRATE 2 G: 40 INJECTION, SOLUTION INTRAVENOUS at 20:12

## 2021-04-21 RX ADMIN — POTASSIUM PHOSPHATE, MONOBASIC AND POTASSIUM PHOSPHATE, DIBASIC 12 MMOL: 224; 236 INJECTION, SOLUTION, CONCENTRATE INTRAVENOUS at 19:55

## 2021-04-21 RX ADMIN — PRAVASTATIN SODIUM 40 MG: 40 TABLET ORAL at 16:27

## 2021-04-21 RX ADMIN — VANCOMYCIN HYDROCHLORIDE 1250 MG: 10 INJECTION, POWDER, LYOPHILIZED, FOR SOLUTION INTRAVENOUS at 14:59

## 2021-04-21 RX ADMIN — PHENOBARBITAL SODIUM 840 MG: 65 INJECTION INTRAMUSCULAR at 16:43

## 2021-04-21 RX ADMIN — TAMSULOSIN HYDROCHLORIDE 0.4 MG: 0.4 CAPSULE ORAL at 16:27

## 2021-04-21 RX ADMIN — GABAPENTIN 300 MG: 300 CAPSULE ORAL at 16:27

## 2021-04-21 RX ADMIN — NICOTINE 21 MG: 21 PATCH, EXTENDED RELEASE TRANSDERMAL at 16:28

## 2021-04-21 RX ADMIN — MAGNESIUM SULFATE HEPTAHYDRATE 2 G: 40 INJECTION, SOLUTION INTRAVENOUS at 17:56

## 2021-04-21 RX ADMIN — SODIUM CHLORIDE, SODIUM GLUCONATE, SODIUM ACETATE, POTASSIUM CHLORIDE, MAGNESIUM CHLORIDE, SODIUM PHOSPHATE, DIBASIC, AND POTASSIUM PHOSPHATE 1000 ML: .53; .5; .37; .037; .03; .012; .00082 INJECTION, SOLUTION INTRAVENOUS at 19:39

## 2021-04-21 RX ADMIN — FLUTICASONE FUROATE AND VILANTEROL TRIFENATATE 1 PUFF: 200; 25 POWDER RESPIRATORY (INHALATION) at 16:28

## 2021-04-21 RX ADMIN — SODIUM CHLORIDE 1000 ML: 0.9 INJECTION, SOLUTION INTRAVENOUS at 11:47

## 2021-04-21 RX ADMIN — METRONIDAZOLE 500 MG: 500 INJECTION, SOLUTION INTRAVENOUS at 13:43

## 2021-04-21 RX ADMIN — SODIUM CHLORIDE 250 ML/HR: 0.9 INJECTION, SOLUTION INTRAVENOUS at 12:14

## 2021-04-21 RX ADMIN — GABAPENTIN 300 MG: 300 CAPSULE ORAL at 21:44

## 2021-04-21 RX ADMIN — NOREPINEPHRINE BITARTRATE 4 MCG/MIN: 1 INJECTION, SOLUTION, CONCENTRATE INTRAVENOUS at 18:16

## 2021-04-21 NOTE — QUICK NOTE
Updated patient's son Mesha Alonzo regarding the patient's admission to step down ICU  Reviewed medical condition and current treatments  All questions were sufficiently answered to his satisfaction

## 2021-04-21 NOTE — ASSESSMENT & PLAN NOTE
· Hypotension present on arrival to ED with SBP in 60's initially  · Also with tachypnea, lactic acidosis 7 1, and thrombocytopenia 28  · Likely 2/2 severe dehydration in the setting of ETOH use and multiple days of diarrhea/vomiting/poor PO intake   · Also rules in for SIRS/septic shock   · CXR clear, Covid-19 negative  · CT C/A/P negative for any obvious source of infection  · MRSA swab pending  · UA negative for nitrites, leukocytes  · BC x 2 pending  · Started on empiric cefepime, Flagyl and Vancomycin pending cultures  · Procal elevated at 1 32   · Stool for C-diff positive overnight  Added PO vanco  · SBP initially improved to 90's s/p IVF boluses, but developed hypotension last evening again  TLC and a-line placed, and received another 1000ml fluid bolus and was briefly on levo, now off  · Continue Isolyte at 100  · AM cortisol level pending  · Persistent hypocalcemia overnight despite treatment   See plan below  · Will continue Step down 1 treatment given borderline hypotension

## 2021-04-21 NOTE — PROGRESS NOTES
Vancomycin Assessment    Juan Jose Cardona is a 63 y o  male who is currently receiving vancomycin 1250mg IV once for bacteremia     Relevant clinical data and objective history reviewed:  Creatinine   Date Value Ref Range Status   04/21/2021 1 60 (H) 0 60 - 1 30 mg/dL Final     Comment:     Standardized to IDMS reference method   04/21/2021 1 97 (H) 0 60 - 1 30 mg/dL Final     Comment:     Standardized to IDMS reference method   12/17/2020 1 50 (H) 0 60 - 1 30 mg/dL Final     Comment:     Standardized to IDMS reference method     /56 (BP Location: Right arm)   Pulse 90   Temp 99 °F (37 2 °C) (Tympanic)   Resp 21   Ht 6' 2" (1 88 m)   Wt 84 kg (185 lb 3 oz)   SpO2 95%   BMI 23 78 kg/m²   No intake/output data recorded  Lab Results   Component Value Date/Time    BUN 36 (H) 04/21/2021 03:58 PM    BUN 25 (H) 10/13/2020 03:01 PM    WBC 8 64 04/21/2021 11:23 AM    HGB 10 3 (L) 04/21/2021 11:23 AM    HCT 30 7 (L) 04/21/2021 11:23 AM    MCV 96 04/21/2021 11:23 AM    PLT 28 (LL) 04/21/2021 11:23 AM     Temp Readings from Last 3 Encounters:   04/21/21 99 °F (37 2 °C) (Tympanic)   04/12/21 (!) 97 3 °F (36 3 °C) (Tympanic)   03/11/21 98 3 °F (36 8 °C) (Tympanic)     Vancomycin Days of Therapy: 1    Assessment/Plan  The patient is currently on vancomycin utilizing scheduled dosing based on actual body weight  Baseline risks associated with therapy include: concomitant nephrotoxic medications and advanced age  The patient is currently receiving 1250mg IV once and after clinical evaluation will be changed to 1000mg IV q12h  Pharmacy will also follow closely for s/sx of nephrotoxicity, infusion reactions, and appropriateness of therapy  BMP and CBC will be ordered per protocol  Plan for trough as patient approaches steady state, prior to the 5th  dose at approximately 1430 on 04/23/21  Due to infection severity, will target a trough of 15-20 (appropriate for most indications)     Pharmacy will continue to follow the patients culture results and clinical progress daily      Marshall Simon, Pharmacist

## 2021-04-21 NOTE — PROGRESS NOTES
MERT RAMÍREZ received ADT page for patient in the ED due to weakness  MERT RAMÍREZ called [facility] program and spoke with Prince Shin ([phone])  Prince Shin confirmed that she did reach out to patient, patient did not answer, Prince Shin left voicemail and will continue to follow up regarding  MERT RAMÍREZ will continue to follow

## 2021-04-21 NOTE — ASSESSMENT & PLAN NOTE
· Has had thrombocytopenia in past, previously attributed to chronic alcohol abuse  · Most recent hospitalization 12/2020 with levels around 150-250 at discharge, though again low on admission  · Unclear etiology at this time  Possibly with chronic alcohol abuse as contributing factor  · Hold AC at this time given low platelets and high risk for bleeding given poor balance and ETOH abuse  · CBC daily   AM level 23  · Consider transfusion if <20 or S&S of bleeding

## 2021-04-21 NOTE — ASSESSMENT & PLAN NOTE
· Anion gap 17 on arrival in setting of lactic acidosis  Improved overnight to 14, and now closed at 13  · LA cleared last evening  · UA negative for ketones, BG 86  · Serum bicarb 20 on arrival (baseline 25-26)  Remained low overnight at 19, now 21 this AM  · CK mildly elevated at 501  Has had recent falls while using alcohol  Multiple bruises throughout right flank, shoulder, left hip, knees  No limb edema or pain  Unlikely rhabdo  AM   · Continues with diarrhea, likely contributing to acidosis  · Mild ENMA with creat 1 97 on arrival and remains elevated this AM at  1 74   Continue IV hydration  · Trend BMP's as needed

## 2021-04-21 NOTE — ASSESSMENT & PLAN NOTE
Lab Results   Component Value Date    EGFR 42 04/22/2021    EGFR 43 04/22/2021    EGFR 46 04/21/2021    CREATININE 1 74 (H) 04/22/2021    CREATININE 1 72 (H) 04/22/2021    CREATININE 1 60 (H) 04/21/2021   · Unclear baseline as patient has had multiple admissions with ENMA on CKD  Baseline early 2020 appears 0 9-1 0, though recent baseline seems higher around 1 5  · Admitted with Creat 1 97  S/p IV contrast and 4L IVF's for septic shock in ED  · Remained hypotensive early evening, likely contributing to ENMA  Stabilized s/p 1L IVF and briefly on levo  · Continue Isolyte @100  · Hold home lisinopril   · Trend BMP  AM creat 1 74  · Avoid nephrotoxic drugs and hypotension   Goal MAP >65

## 2021-04-21 NOTE — ASSESSMENT & PLAN NOTE
Lab Results   Component Value Date    HGBA1C 5 3 12/07/2020       No results for input(s): POCGLU in the last 72 hours      Blood Sugar Average: Last 72 hrs:  · Per last PCP visit, patient is controlled off of antidiabetic medications  · Monitor AC/HS and add SSI

## 2021-04-21 NOTE — QUICK NOTE
I obtained telephone consent for central line placement from the patient's son, Varsha Nguyễn  Risks and benefits of the procedure were explained  Verbal consent obtained on 4/21/21 at 17:37

## 2021-04-21 NOTE — ASSESSMENT & PLAN NOTE
· Hx of CAD s/p CABG 2004, PCI to LM 2014  · Initially recommended on plavix/Eliquis but not started post last admission 2/2 frequent falls, high risk of bleeding and chronic alcohol abuse  · Continue home statin  · Holding home metoprolol and Ranolazine given hypotension   Resume once stabilized

## 2021-04-21 NOTE — ASSESSMENT & PLAN NOTE
· NSR to ST on monitor currently  · EKG ordered  · Not on AC OP 2/2 imbalance, chronic alcohol abuse, and high risk for bleeding  · Continue on cardiac monitor while admitted  · Holding home Metoprolol 25 BID 2/2 hypotension

## 2021-04-21 NOTE — SEPSIS NOTE
Sepsis Note   Jagdish Kent 63 y o  male MRN: [id number]  Unit/Bed#: LENA Encounter: [id number]      qSOFA     [address] Name 04/21/21 1400 04/21/21 1345 04/21/21 1330 04/21/21 1315 04/21/21 1245    Altered mental status GCS < 15  --  --  --  --  --    Respiratory Rate > / =22  0  1  0  0  0    Systolic BP < / =615  1  1  1  1  1    Q Sofa Score  1  2  1  1  1    Row Name 04/21/21 1237 04/21/21 1205 04/21/21 1200 04/21/21 1144 04/21/21 1130    Altered mental status GCS < 15  --  --  --  --  --    Respiratory Rate > / =22  1  1  1  1  1    Systolic BP < / =811  0  1  1  1  1    Q Sofa Score  1  2  2  2  2    Row Name 04/21/21 1127 04/21/21 1110             Altered mental status GCS < 15  --  --       Respiratory Rate > / =22  0  0       Systolic BP < / =914  1  1       Q Sofa Score  1  1           Initial Sepsis Screening     Row Name 04/21/21 1100                Is the patient's history suggestive of a new or worsening infection?  --        Suspected source of infection  suspect infection, source unknown  -KF        Are two or more of the following signs & symptoms of infection both present and new to the patient?  --        Indicate SIRS criteria  Tachycardia > 90 bpm  -KF        If the answer is yes to both questions, suspicion of sepsis is present  --        If severe sepsis is present AND tissue hypoperfusion perists in the hour after fluid resuscitation or lactate > 4, the patient meets criteria for SEPTIC SHOCK  --        Are any of the following organ dysfunction criteria present within 6 hours of suspected infection and SIRS criteria that are NOT considered to be chronic conditions? --        Organ dysfunction  Creatinine > 2 0 mg/dL; Lactate >/equal 4 0 mmol/L;Platelet count < 478,539/CXQ  -KF        Date of presentation of severe sepsis  04/21/21  -KF        Time of presentation of severe sepsis  1100  -KF        Tissue hypoperfusion persists in the hour after crystalloid fluid administration, evidenced, by either:  SBP < 90 mm/Hg ( ___ mm/Hg in comment field)  -KF        Was hypotension present within one hour of the conclusion of crystalloid fluid administration?  --        Date of presentation of septic shock  --        Time of presentation of septic shock  --          User Key  (r) = Recorded By, (t) = Taken By, (c) = Cosigned By    Initials Name Provider Type    [address] [place] [address], DO Physician               Default Flowsheet Data (last 720 hours)      Sepsis Reassess     Row Name 04/21/21 1437                   Repeat Volume Status and Tissue Perfusion Assessment Performed    Repeat Volume Status and Tissue Perfusion Assessment Performed  Yes  -KF           Volume Status and Tissue Perfusion Post Fluid Resuscitation * Must Document All *    Vital Signs Reviewed (HR, RR, BP, T)  Yes  -KF        Shock Index Reviewed  Yes  -KF        Arterial Oxygen Saturation Reviewed (POx, SaO2 or SpO2)  Yes (comment %)  -KF        Cardio  Normal S1/S2  -KF        Pulmonary  Normal effort  -KF        Capillary Refill  Brisk  -KF        Peripheral Pulses  Radial  -KF        Peripheral Pulse  +3  -KF        Skin  Warm  -KF        Urine output assessed  None  -KF           *OR*   Intensive Monitoring- Must Document One of the Following Four *:    Vital Signs Reviewed  --        * Central Venous Pressure (CVP or RAP)  --        * Central Venous Oxygen (SVO2, ScvO2 or Oxygen saturation via central catheter)  --        * Bedside Cardiovascular US in IVC diameter and % collapse  --        * Passive Leg Raise OR Crystalloid Challenge  --          User Key  (r) = Recorded By, (t) = Taken By, (c) = Cosigned By    Initials Name Provider Type    [address] [place] [address], DO Physician

## 2021-04-21 NOTE — ASSESSMENT & PLAN NOTE
· Per patient drinks approximately 1 qt of vodka daily  Reports last drink was yesterday  · ETOH level 210 on arrival  · Has historically been admitted for frequent falls when intoxicated  Most recent admission 12/20 required C-spine fixation at B and C-collar OP  · Has previously expressed interest in detox center for assistance  Has tried to stop on his own but begins again when he starts to get shaky  · Currently with moderate tremors on exam  Alert and oriented to self and place, but states "2012"  Reoriented as needed  · Will provide phenobarbital bolus on arrival to ICU    · Follow CIWA scores and give PRN phenobarb if showing further signs of withdrawal  · Case Management for OP Rehab/detox  · Fall precautions

## 2021-04-21 NOTE — ASSESSMENT & PLAN NOTE
· Has had thrombocytopenia in past, previously attributed to chronic alcohol abuse  · Most recent hospitalization 12/2020 with levels around 150-250 at discharge, though again low on admission  · Unclear etiology at this time   Possibly with chronic alcohol abuse as contributing factor  · Hold AC at this time given low platelets and high risk for bleeding given poor balance and ETOH abuse  · CBC daily  · Consider transfusion if <20 or S&S of bleeding

## 2021-04-21 NOTE — ASSESSMENT & PLAN NOTE
· NSR to ST on monitor currently  · EKG 4/21 showing NSR with nonspecific ST abnormality, rightward axis  · Few runs of tachycardia overnight to 140's, some appearing irregular  · Not on AC OP 2/2 imbalance, chronic alcohol abuse, and high risk for bleeding  · Continue on cardiac monitor while admitted  · Holding home Metoprolol 25 BID 2/2 hypotension  Would resume at half dose this AM to prevent conversion to afib given history

## 2021-04-21 NOTE — ASSESSMENT & PLAN NOTE
· Presented with c/o 6 days nausea, vomiting, and diarrhea  Continued with diarrhea overnight  · Unable to take anything PO  Has tolerated alcohol  · Contributing to dehydration, acidosis  · CT abdomen negative for any acute findings  · C-diff toxin by PCR positive   PO Vanco added  · IVF's for re-hydration  · Trend BMP  · Stool cultures pending

## 2021-04-21 NOTE — ASSESSMENT & PLAN NOTE
· Anion gap 17 on arrival in setting of lactic acidosis and possible alcoholic ketosis  · UA pending, serum bicarb 20 (baseline 25-26)  · CK mildly elevated at 501  Has had recent falls while using alcohol  Multiple bruises throughout right flank, shoulder, left hip, knees  No limb edema or pain  Unlikely rhabdo  Will recheck CK in AM   · BG 86 with hx DM2, controlled  · Mild ENMA with creat 1 97 on arrival  See plan above  · S/p 4L IVF's   Lactic acid down to 2 9  · Check BMP tonight and in AM

## 2021-04-21 NOTE — ASSESSMENT & PLAN NOTE
· Persistently hypotensive in ED from SBP 60's on arrival to low 90's s/p 4L IVF's  · Hold home amlodipine 5mg QD and lisinopril 10mg QD given hypotension   Resume when stabilized  · Monitor VS per protocol

## 2021-04-21 NOTE — ASSESSMENT & PLAN NOTE
Lab Results   Component Value Date    EGFR 36 04/21/2021    EGFR 51 12/17/2020    EGFR 43 12/16/2020    CREATININE 1 97 (H) 04/21/2021    CREATININE 1 50 (H) 12/17/2020    CREATININE 1 70 (H) 12/16/2020   · Unclear baseline as patient has had multiple admissions with ENMA on CKD  Baseline early 2020 appears 0 9-1 0, though recent baseline seems higher around 1 5  · Admitted with Creat 1 97  S/p IV contrast and 4L IVF's for septic shock in ED  · Hold home lisinopril   · Will continue gentle hydration with Isolyte 50ml/hr x24 hours and re-evaluate in AM  · BMP in AM  · Avoid nephrotoxic drugs and hypotension   Goal MAP >65

## 2021-04-21 NOTE — ASSESSMENT & PLAN NOTE
· POA AEB hypotension, tachypnea, lactic acidosis 7 1, thrombocytopenia 28  · Unclear of source for infection  Possibly GI in setting of 6 days N/V/D  Also with ETOH 210 with last intake >1D ago  · Also with differential of severe dehydration in setting of ETOH use and multiple days of diarrhea/vomiting/poor PO intake  · CXR clear, Covid-19 negative  · CT C/A/P negative for any obvious source of infection  · MRSA swab pending  · UA and BC x2 pending  · Started on empiric cefepime, Flagyl and Vancomycin pending cultures  · Procal pending  · BP improved s/p IVF bolus per sepsis algorithm   Received 4L IVF's in ED which is sufficient for the required 30ml/kg bolus for sepsis using IBW 82 2kg (2,466ml)  · Will admit to Step down 1 given history of ETOH withdrawal and hypotension

## 2021-04-21 NOTE — ASSESSMENT & PLAN NOTE
· Current 1PPD smoker  · Not on home O2  · No wheezing on exam  · Continue home Breo-Ellipta with PRN albuterol  · No acute issues

## 2021-04-21 NOTE — PLAN OF CARE
Plan of care initiated      Problem: PAIN - ADULT  Goal: Verbalizes/displays adequate comfort level or baseline comfort level  Description: Interventions:  - Encourage patient to monitor pain and request assistance  - Assess pain using appropriate pain scale  - Administer analgesics based on type and severity of pain and evaluate response  - Implement non-pharmacological measures as appropriate and evaluate response  - Consider cultural and social influences on pain and pain management  - Notify physician/advanced practitioner if interventions unsuccessful or patient reports new pain  Outcome: Progressing     Problem: SAFETY ADULT  Goal: Patient will remain free of falls  Description: INTERVENTIONS:  - Assess patient frequently for physical needs  -  Identify cognitive and physical deficits and behaviors that affect risk of falls    -  [place] fall precautions as indicated by assessment   - Educate patient/family on patient safety including physical limitations  - Instruct patient to call for assistance with activity based on assessment  - Modify environment to reduce risk of injury  - Consider OT/PT consult to assist with strengthening/mobility  Outcome: Progressing  Goal: Maintain or return to baseline ADL function  Description: INTERVENTIONS:  -  Assess patient's ability to carry out ADLs; assess patient's baseline for ADL function and identify physical deficits which impact ability to perform ADLs (bathing, care of mouth/teeth, toileting, grooming, dressing, etc )  - Assess/evaluate cause of self-care deficits   - Assess range of motion  - Assess patient's mobility; develop plan if impaired  - Assess patient's need for assistive devices and provide as appropriate  - Encourage maximum independence but intervene and supervise when necessary  - Involve family in performance of ADLs  - Assess for home care needs following discharge   - Consider OT consult to assist with ADL evaluation and planning for discharge  - Provide patient education as appropriate  Outcome: Progressing  Goal: Maintain or return mobility status to optimal level  Description: INTERVENTIONS:  - Assess patient's baseline mobility status (ambulation, transfers, stairs, etc )    - Identify cognitive and physical deficits and behaviors that affect mobility  - Identify mobility aids required to assist with transfers and/or ambulation (gait belt, sit-to-stand, lift, walker, cane, etc )  - [place] fall precautions as indicated by assessment  - Record patient progress and toleration of activity level on Mobility SBAR; progress patient to next Phase/Stage  - Instruct patient to call for assistance with activity based on assessment  - Consider rehabilitation consult to assist with strengthening/weightbearing, etc   Outcome: Progressing     Problem: DISCHARGE PLANNING  Goal: Discharge to home or other facility with appropriate resources  Description: INTERVENTIONS:  - Identify barriers to discharge w/patient and caregiver  - Arrange for needed discharge resources and transportation as appropriate  - Identify discharge learning needs (meds, wound care, etc )  - Arrange for interpretive services to assist at discharge as needed  - Refer to Case Management Department for coordinating discharge planning if the patient needs post-hospital services based on physician/advanced practitioner order or complex needs related to functional status, cognitive ability, or social support system  Outcome: Progressing     Problem: Knowledge Deficit  Goal: Patient/family/caregiver demonstrates understanding of disease process, treatment plan, medications, and discharge instructions  Description: Complete learning assessment and assess knowledge base    Interventions:  - Provide teaching at level of understanding  - Provide teaching via preferred learning methods  Outcome: Progressing     Problem: NEUROSENSORY - ADULT  Goal: Achieves stable or improved neurological status  Description: INTERVENTIONS  - Monitor and report changes in neurological status  - Monitor vital signs such as temperature, blood pressure, glucose, and any other labs ordered   - Initiate measures to prevent increased intracranial pressure  - Monitor for seizure activity and implement precautions if appropriate      Outcome: Progressing     Problem: GASTROINTESTINAL - ADULT  Goal: Minimal or absence of nausea and/or vomiting  Description: INTERVENTIONS:  - Administer IV fluids if ordered to ensure adequate hydration  - Maintain NPO status until nausea and vomiting are resolved  - Nasogastric tube if ordered  - Administer ordered antiemetic medications as needed  - Provide nonpharmacologic comfort measures as appropriate  - Advance diet as tolerated, if ordered  - Consider nutrition services referral to assist patient with adequate nutrition and appropriate food choices  Outcome: Progressing     Problem: METABOLIC, FLUID AND ELECTROLYTES - ADULT  Goal: Electrolytes maintained within normal limits  Description: INTERVENTIONS:  - Monitor labs and assess patient for signs and symptoms of electrolyte imbalances  - Administer electrolyte replacement as ordered  - Monitor response to electrolyte replacements, including repeat lab results as appropriate  - Instruct patient on fluid and nutrition as appropriate  Outcome: Progressing

## 2021-04-21 NOTE — ASSESSMENT & PLAN NOTE
· S/p c-spine fixation 12/20 with PT/OT and c-collar use OP  · Reports falling 2 days ago while intoxicated  Multiple bruises throughout body, notably on head/forehead  · CT c-spine 4/12 showing: No cervical spine fracture or traumatic malalignment  Unchanged anterior and posterior cervical fusion as above  No acute hardware complication  Please note the left T1 pedicle screw traverses the upper margin of the adjacent T1-T2 neural foramen  This was seen previously  Correlate for any radiculopathy in this territory  Increasing fluid in the posterior soft tissues along the upper cervical spine laminectomy bed which could be indicative of pseudomeningocele  Limited evaluation due to hardware artifact and absence of IV contrast  Moderately advanced multilevel neuroforaminal narrowing which is most pronounced at C3-4 on the right    · No focal deficits   Follow up with Neurosurgery OP regarding above findings of possible meningocele

## 2021-04-21 NOTE — ASSESSMENT & PLAN NOTE
· Per patient drinks approximately 1 qt of vodka daily  Reports last drink was yesterday  · ETOH level 210 on arrival  · Has historically been admitted for frequent falls when intoxicated  Most recent admission 12/20 required C-spine fixation at [facility] and C-collar OP  · Has previously expressed interest in detox center for assistance  Has tried to stop on his own but begins again when he starts to get shaky  · Currently with moderate tremors on exam  Alert and oriented to self and place only  Reoriented as needed    · S/p phenobarb load last evening  · Follow CIWA scores and give PRN phenobarb if showing further signs of withdrawal  · Case Management for OP Rehab/detox  · Fall precautions

## 2021-04-21 NOTE — SEPSIS NOTE
Sepsis Note   John Longo 63 y o  male MRN: [id number]  Unit/Bed#: ED 04 Encounter: [id number]      qSOFA     Row Name 04/21/21 1330 04/21/21 1315 04/21/21 1245 04/21/21 1237 04/21/21 1205    Altered mental status GCS < 15  --  --  --  --  --    Respiratory Rate > / =22  0  0  0  1  1    Systolic BP < / =314  1  1  1  0  1    Q Sofa Score  1  1  1  1  2    Row Name 04/21/21 1200 04/21/21 1144 04/21/21 1130 04/21/21 1127 04/21/21 1110    Altered mental status GCS < 15  --  --  --  --  --    Respiratory Rate > / =22  1  1  1  0  0    Systolic BP < / =051  1  1  1  1  1    Q Sofa Score  2  2  2  1  1        Initial Sepsis Screening     Row Name 04/21/21 1100                Is the patient's history suggestive of a new or worsening infection?  --        Suspected source of infection  suspect infection, source unknown  -KF        Are two or more of the following signs & symptoms of infection both present and new to the patient?  --        Indicate SIRS criteria  Tachycardia > 90 bpm  -KF        If the answer is yes to both questions, suspicion of sepsis is present  --        If severe sepsis is present AND tissue hypoperfusion perists in the hour after fluid resuscitation or lactate > 4, the patient meets criteria for SEPTIC SHOCK  --        Are any of the following organ dysfunction criteria present within 6 hours of suspected infection and SIRS criteria that are NOT considered to be chronic conditions? --        Organ dysfunction  Creatinine > 2 0 mg/dL; Lactate >/equal 4 0 mmol/L;Platelet count < 107,186/ASG  -KF        Date of presentation of severe sepsis  04/21/21  -KF        Time of presentation of severe sepsis  1100  -KF        Tissue hypoperfusion persists in the hour after crystalloid fluid administration, evidenced, by either:  SBP < 90 mm/Hg ( ___ mm/Hg in comment field)  -KF        Was hypotension present within one hour of the conclusion of crystalloid fluid administration?  --        Date of presentation of septic shock  --        Time of presentation of septic shock  --          User Key  (r) = Recorded By, (t) = Taken By, (c) = Cosigned By    [id number] Name Provider Type    [id number] Wilfrido Santos DO Physician

## 2021-04-21 NOTE — ASSESSMENT & PLAN NOTE
· Presented with c/o 6 days nausea, vomiting, and diarrhea  · Unable to take anything PO  Has tolerated alcohol  · Contributing to dehydration, lactic acidosis  · CT abdomen negative for any acute findings  · BMP with K+3 9, mag 0 8  Replete accordingly   Monitor daily  · Will add stool cultures and c-diff toxin to r/o infectious etiology in setting of hypotension/sepsis

## 2021-04-21 NOTE — ASSESSMENT & PLAN NOTE
Wt Readings from Last 3 Encounters:   04/21/21 84 kg (185 lb 3 oz)   04/12/21 82 7 kg (182 lb 4 8 oz)   03/11/21 88 9 kg (196 lb)     · Last ECHO 12/20 showing EF 55-60%  There were no regional wall motion abnormalities  There was moderate concentric hypertrophy  Mild MR, no AS  Pulmonary artery systolic pressure was mildly to moderately increased   Estimated peak PA pressure was 55 mmHg  · Weight is down to 84kg from previously recorded value last week of 82 7kg in setting of dehydration  · Cautious fluid replacement

## 2021-04-21 NOTE — H&P
[address] 47  H&P- Tegan Mcdonald 1960, 63 y o  male MRN: [id number]  Unit/Bed#: ICU 02 Encounter: [id number]  Primary Care Provider: Patrick Tse DO   Date and time admitted to hospital: 4/21/2021 11:09 AM    * Hypovolemic shock ([address] )  Assessment & Plan  · Hypotension present on arrival to ED with SBP in 60's initially  · Also with tachypnea, lactic acidosis 7 1, and thrombocytopenia 28  · Likely 2/2 severe dehydration in the setting of ETOH use and multiple days of diarrhea/vomiting/poor PO intake   · Also rules in for SIRS/septic shock   · CXR clear, Covid-19 negative  · CT C/A/P negative for any obvious source of infection  · MRSA swab pending  · UA and BC x2 pending  · Started on empiric cefepime, Flagyl and Vancomycin pending cultures  · Procal pending  Will d/c empiric coverage pending - negative procal's and culture results  · SBP improved to 90's s/p IVF boluses  Received 4L IVF's in ED which is sufficient for the required 30ml/kg bolus for sepsis using IBW 82 2kg (2,466ml)  · Will admit to Step down 1 given history of ETOH withdrawal and hypotension    Lactic acidosis  Assessment & Plan  · LA 7 1 in setting of ETOH abuse, prolonged vomiting/diarrhea, dehydration, and sepsis  · Received 4L IVF in ED for sepsis   · Repeat LA 2 9  Will trend q2h until clear    Increased anion gap metabolic acidosis  Assessment & Plan  · Anion gap 17 on arrival in setting of lactic acidosis and possible alcoholic ketosis  · UA pending, serum bicarb 20 (baseline 25-26)  · CK mildly elevated at 501  Has had recent falls while using alcohol  Multiple bruises throughout right flank, shoulder, left hip, knees  No limb edema or pain  Unlikely rhabdo  Will recheck CK in AM   · BG 86 with hx DM2, controlled  · Mild ENMA with creat 1 97 on arrival  See plan above  · S/p 4L IVF's   Lactic acid down to 2 9  · Check BMP tonight and in AM      Acute on chronic kidney failure [facility])  Assessment & Plan  Lab Results   Component Value Date    EGFR 46 04/21/2021    EGFR 36 04/21/2021    EGFR 51 12/17/2020    CREATININE 1 60 (H) 04/21/2021    CREATININE 1 97 (H) 04/21/2021    CREATININE 1 50 (H) 12/17/2020   · Unclear baseline as patient has had multiple admissions with ENMA on CKD  Baseline early 2020 appears 0 9-1 0, though recent baseline seems higher around 1 5  · Admitted with Creat 1 97  S/p IV contrast and 4L IVF's for septic shock in ED  · Hold home lisinopril   · Will continue gentle hydration with Isolyte 50ml/hr x24 hours and re-evaluate in AM  · BMP in AM  · Avoid nephrotoxic drugs and hypotension  Goal MAP >65    Diarrhea  Assessment & Plan  · Presented with c/o 6 days nausea, vomiting, and diarrhea  · Unable to take anything PO  Has tolerated alcohol  · Contributing to dehydration, lactic acidosis  · CT abdomen negative for any acute findings  · BMP with K+3 9, mag 0 8  Replete accordingly  Monitor daily  · Will add stool cultures and c-diff toxin to r/o infectious etiology in setting of hypotension/sepsis    Alcohol abuse  Assessment & Plan  · Per patient drinks approximately 1 qt of vodka daily  Reports last drink was yesterday  · ETOH level 210 on arrival  · Has historically been admitted for frequent falls when intoxicated  Most recent admission 12/20 required C-spine fixation at [facility] and C-collar OP  · Has previously expressed interest in detox center for assistance  Has tried to stop on his own but begins again when he starts to get shaky  · Currently with moderate tremors on exam  Alert and oriented to self and place, but states "2012"  Reoriented as needed  · Will provide phenobarbital bolus on arrival to ICU    · Follow CIWA scores and give PRN phenobarb if showing further signs of withdrawal  · Case Management for OP Rehab/detox  · Fall precautions    Thrombocytopenia ([facility] )  Assessment & Plan  · Has had thrombocytopenia in past, previously attributed to chronic alcohol abuse  · Most recent hospitalization 12/2020 with levels around 150-250 at discharge, though again low on admission  · Unclear etiology at this time  Possibly with chronic alcohol abuse as contributing factor  · Hold AC at this time given low platelets and high risk for bleeding given poor balance and ETOH abuse  · CBC daily  · Consider transfusion if <20 or S&S of bleeding    Cervical spinal stenosis  Assessment & Plan  · S/p c-spine fixation 12/20 with PT/OT and c-collar use OP  · Reports falling 2 days ago while intoxicated  Multiple bruises throughout body, notably on head/forehead  · CT c-spine 4/12 showing: No cervical spine fracture or traumatic malalignment  Unchanged anterior and posterior cervical fusion as above  No acute hardware complication  Please note the left T1 pedicle screw   traverses the upper margin of the adjacent T1-T2 neural foramen  This was seen previously  Correlate for any radiculopathy    in this territory        Increasing fluid in the posterior soft tissues along the upper cervical spine laminectomy bed which could be indicative of    pseudomeningocele  Limited evaluation due to hardware artifact and absence of IV contrast      Moderately advanced multilevel neuroforaminal narrowing which is most pronounced at C3-4 on the right    · No focal deficits   Follow up with Neurosurgery OP regarding above findings of possible meningocele      History of Atrial fibrillation (HCC)  Assessment & Plan  · NSR to ST on monitor currently  · EKG ordered  · Not on AC OP 2/2 imbalance, chronic alcohol abuse, and high risk for bleeding  · Continue on cardiac monitor while admitted  · Holding home Metoprolol 25 BID 2/2 hypotension     HLD (hyperlipidemia)  Assessment & Plan  · Continue home statin  · Follow up OP with PCP for monitoring    Nicotine abuse  Assessment & Plan  · 1PPD smoker  · Nicotine patch ordered  · Encourage smoking cessation     COPD (chronic obstructive pulmonary disease) ([facility] [address] )  Assessment & Plan  · Current 1PPD smoker  · Not on home O2  · No wheezing on exam  · Continue home Breo-Ellipta with PRN albuterol  · No acute issues     Diastolic heart failure (HCC)  Assessment & Plan  Wt Readings from Last 3 Encounters:   04/21/21 84 kg (185 lb 3 oz)   04/12/21 82 7 kg (182 lb 4 8 oz)   03/11/21 88 9 kg (196 lb)     · Last ECHO 12/20 showing EF 55-60%  There were no regional wall motion abnormalities  There was moderate concentric hypertrophy  Mild MR, no AS  Pulmonary artery systolic pressure was mildly to moderately increased  Estimated peak PA pressure was 55 mmHg  · Weight is down to 84kg from previously recorded value last week of 82 7kg in setting of dehydration  · Cautious fluid replacement        CAD (coronary artery disease)  Assessment & Plan  · Hx of CAD s/p CABG 2004, PCI to LM 2014  · Initially recommended on plavix/Eliquis but not started post last admission 2/2 frequent falls, high risk of bleeding and chronic alcohol abuse  · Continue home statin  · Holding home metoprolol and Ranolazine given hypotension  Resume once stabilized    Essential (primary) hypertension  Assessment & Plan  · Persistently hypotensive in ED from SBP 60's on arrival to low 90's s/p 4L IVF's  · Hold home amlodipine 5mg QD and lisinopril 10mg QD given hypotension  Resume when stabilized  · Monitor VS per protocol    Type 2 diabetes mellitus ([facility] [address] )  Assessment & Plan  Lab Results   Component Value Date    HGBA1C 5 3 12/07/2020       No results for input(s): POCGLU in the last 72 hours      Blood Sugar Average: Last 72 hrs:  · Per last PCP visit, patient is controlled off of antidiabetic medications  · Monitor AC/HS and add SSI     -------------------------------------------------------------------------------------------------------------  Chief Complaint: Nausea, vomiting, diarrhea    History of Present Illness   HX and PE limited by: Mental status/ETOH   Mario Alberto Villalobos is a 63 y o  male with PMHx of chronic alcohol abuse, frequent falls s/p C-spine fixation 12/2020, COPD,  CAD s/p CABG 2004 and PCI to LM 2014, T2DM, HLD, HTN, and Afib who presents with 4-6 day history of nausea, vomiting and diarrhea  He reports inability to tolerate anything by mouth, but had continued to consume alcohol during this time  Also reports falling from a stand 2 days ago though he did not seek medical attention at that time  Sonya Child asleep on the sidewalk today while waiting for a cab to bring him to the hospital    Bystanders called EMS and he was brought to the ED  On arrival he was hypotensive to SBP of 60's, tachypneic, LA 7 1, AG 17, plt's 28  Notable labs also include ETOH 201, creatinine 1 97, and mag 0 8  Met sepsis criteria, BCx2, UA sent  Was provided with 4 liters IVF's due to persistent hypotension  Started on empiric coverage with Cefepime, flagyl and vanco  SBP increased after 4th liter and was accepted to step down ICU for monitoring  History obtained from chart review and the patient  Patient is a poor historian   -------------------------------------------------------------------------------------------------------------  Dispo: Admit to Stepdown Level 1    Code Status: Level 1 - Full Code  --------------------------------------------------------------------------------------------------------------  Review of Systems   Constitutional: Positive for appetite change and fatigue  Negative for chills and fever  HENT: Negative  Respiratory: Negative for cough, shortness of breath and wheezing  Cardiovascular: Negative for chest pain, palpitations and leg swelling  Gastrointestinal: Positive for abdominal pain, diarrhea, nausea and vomiting  Negative for abdominal distention and constipation  Genitourinary: Negative  Musculoskeletal: Negative  Skin:        Bruising s/p fall 2 days ago   Neurological: Positive for tremors  Psychiatric/Behavioral: Positive for decreased concentration  The patient is nervous/anxious  A 12-point, complete review of systems was reviewed and negative except as stated above     Physical Exam  Vitals signs and nursing note reviewed  Constitutional:       General: He is not in acute distress  Appearance: He is ill-appearing  He is not diaphoretic  HENT:      Head: Normocephalic and atraumatic  Right Ear: External ear normal       Left Ear: External ear normal       Nose: Nose normal       Mouth/Throat:      Mouth: Mucous membranes are moist    Eyes:      General: No scleral icterus  Conjunctiva/sclera:      Right eye: Right conjunctiva is injected  Left eye: Left conjunctiva is injected  Pupils: Pupils are equal, round, and reactive to light  Neck:      Musculoskeletal: Neck supple  Cardiovascular:      Rate and Rhythm: Normal rate and regular rhythm  Pulses:           Radial pulses are 2+ on the right side and 2+ on the left side  Dorsalis pedis pulses are 1+ on the right side and 1+ on the left side  Heart sounds: Normal heart sounds  Pulmonary:      Effort: Pulmonary effort is normal       Breath sounds: Decreased breath sounds present  Abdominal:      General: There is distension  Palpations: Abdomen is soft  Tenderness: There is abdominal tenderness  There is guarding  Musculoskeletal:      Right lower leg: No edema  Left lower leg: No edema  Skin:     General: Skin is warm and dry  Comments: Scattered abrasions on scalp, left forehead, right shoulder, right flank and hip, left hip and b/l knees   Neurological:      General: No focal deficit present  Mental Status: He is alert  He is disoriented  GCS: GCS eye subscore is 4  GCS verbal subscore is 4  GCS motor subscore is 6  Comments: Tremors noted of b/l hands   Psychiatric:         Mood and Affect: Affect is blunt  Behavior: Behavior is cooperative  --------------------------------------------------------------------------------------------------------------  Vitals:   Vitals:    04/21/21 1330 04/21/21 1345 04/21/21 1400 04/21/21 1500   BP: 96/54 96/50 90/52 108/56   BP Location:    Right arm   Pulse: 96 94 90    Resp: 19 22 21    Temp:    99 °F (37 2 °C)   TempSrc:    Tympanic   SpO2: 94% 95% 95%    Weight:    84 kg (185 lb 3 oz)   Height:    6' 2" (1 88 m)     Temp  Min: 97 9 °F (36 6 °C)  Max: 99 °F (37 2 °C)  IBW (Ideal Body Weight): 82 2 kg  Height: 6' 2" (188 cm)  Body mass index is 23 78 kg/m²  Laboratory and Diagnostics:  Results from last 7 days   Lab Units 04/21/21  1123   WBC Thousand/uL 8 64   HEMOGLOBIN g/dL 10 3*   HEMATOCRIT % 30 7*   PLATELETS Thousands/uL 28*   NEUTROS PCT % 83*   MONOS PCT % 9     Results from last 7 days   Lab Units 04/21/21  1558 04/21/21  1127   SODIUM mmol/L 131* 129*   POTASSIUM mmol/L 4 2 3 9   CHLORIDE mmol/L 98* 92*   CO2 mmol/L 18* 20*   ANION GAP mmol/L 15* 17*   BUN mg/dL 36* 42*   CREATININE mg/dL 1 60* 1 97*   CALCIUM mg/dL 6 6* 7 4*   GLUCOSE RANDOM mg/dL 94 86   ALT U/L  --  96*   AST U/L  --  217*   ALK PHOS U/L  --  191*   ALBUMIN g/dL  --  3 0*   TOTAL BILIRUBIN mg/dL  --  1 27*     Results from last 7 days   Lab Units 04/21/21  1558 04/21/21  1127   MAGNESIUM mg/dL 1 4* 0 8*   PHOSPHORUS mg/dL  --  2 2*      Results from last 7 days   Lab Units 04/21/21  1123   INR  1 02   PTT seconds 32      Results from last 7 days   Lab Units 04/21/21  1123   TROPONIN I ng/mL 0 03     Results from last 7 days   Lab Units 04/21/21  1558 04/21/21  1317 04/21/21  1123   LACTIC ACID mmol/L 1 8 2 9* 7 1*     ABG:    VBG:          Micro:  Results from last 7 days   Lab Units 04/21/21  1123   BLOOD CULTURE  Received in Microbiology Lab  Culture in Progress  Received in Microbiology Lab  Culture in Progress  EKG: NSR to ST on monitor  Imaging: I have personally reviewed pertinent reports          Historical Information   Past Medical History:   Diagnosis Date    Cancer [facility])     prostate ca,had radiation    Cardiac disease     stents,then triple bypass    COPD (chronic obstructive [facility] disease) ([facility] [address] )     Diabetes mellitus ([facility] )     ETOH abuse     Hx of heart artery stent     2014    Hyperlipidemia     Hypertension     Pneumonia     Prostate CA ([facility] )     Renal disorder     pyelonephritis    S/P CABG x 1     2004     Past Surgical History:   Procedure Laterality Date    CORONARY ARTERY BYPASS GRAFT  2004    LA ARTHRODESIS ANT INTERBODY MIN DISCECTOMY, CERVICAL BELOW C2 N/A 12/16/2020    Procedure: Anterior cervical discectomy with fusion C4-C7;  Posterior cervical decompression and fusion C2-T2;  Surgeon: Abimael Macedo MD;  Location: [facility];  Service: Neurosurgery    TONSILLECTOMY       Social History   Social History     Substance and Sexual Activity   Alcohol Use Yes    Alcohol/week: 10 0 standard drinks    Types: 10 Shots of liquor per week    Frequency: 4 or more times a week    Drinks per session: 10 or more    Binge frequency: Daily or almost daily    Comment: states last drink this am was vodka which is his usual     Social History     Substance and Sexual Activity   Drug Use No     Social History     Tobacco Use   Smoking Status Current Every Day Smoker    Packs/day: 1 50    Years: 40 00    Pack years: 60 00   Smokeless Tobacco Never Used     Exercise History: N/A  Family History:   Family History   Problem Relation Age of Onset    Diabetes Mother     Uterine cancer Mother     COPD Father     Hypertension Father      I have reviewed this patient's family history and commented on sigificant items within the HPI      Medications:  Current Facility-Administered Medications   Medication Dose Route Frequency    albuterol (PROVENTIL HFA,VENTOLIN HFA) inhaler 2 puff  2 puff Inhalation Q4H PRN    cefepime (MAXIPIME) 2 g/50 mL dextrose IVPB  2,000 mg Intravenous Q12H    fluticasone-vilanterol (BREO ELLIPTA) 200-25 MCG/INH inhaler 1 puff  1 puff Inhalation Daily    folic acid (FOLVITE) tablet 1 mg  1 mg Oral Daily    gabapentin (NEURONTIN) capsule 300 mg  300 mg Oral TID    insulin lispro (HumaLOG) 100 units/mL subcutaneous injection 1-5 Units  1-5 Units Subcutaneous Q6H [address]    magnesium sulfate 2 g/50 mL IVPB (premix) 2 g  2 g Intravenous Once    magnesium sulfate 2 g/50 mL IVPB (premix) 2 g  2 g Intravenous Once    metroNIDAZOLE (FLAGYL) IVPB (premix) 500 mg 100 mL  500 mg Intravenous Q8H    multi-electrolyte (PLASMALYTE-A/ISOLYTE-S PH 7 4) IV solution  50 mL/hr Intravenous Continuous    nicotine (NICODERM CQ) 21 mg/24 hr TD 24 hr patch 21 mg  21 mg Transdermal Daily    pravastatin (PRAVACHOL) tablet 40 mg  40 mg Oral Daily With Dinner    tamsulosin (FLOMAX) capsule 0 4 mg  0 4 mg Oral Daily    thiamine tablet 100 mg  100 mg Oral Daily     Home medications:  Prior to Admission Medications   Prescriptions Last Dose Informant Patient Reported? Taking?    Blood Glucose Monitoring Suppl (ONE TOUCH ULTRA MINI) w/Device KIT  Self Yes No   Sig: Use as directed   ONETOUCH DELICA LANCETS FINE MISC  Self Yes No   Sig: 3 (three) times a day Test   albuterol (PROVENTIL HFA,VENTOLIN HFA) 90 mcg/act inhaler  Self No No   Sig: Inhale 2 puffs every 4 (four) hours as needed for wheezing   amLODIPine (NORVASC) 5 mg tablet   No No   Sig: Take 1 tablet (5 mg total) by mouth daily   fluticasone-salmeterol (ADVAIR, WIXELA) 500-50 mcg/dose inhaler   No No   Sig: Inhale 1 puff every 12 (twelve) hours   folic acid (FOLVITE) 1 mg tablet   No No   Sig: Take 1 tablet (1 mg total) by mouth daily   gabapentin (NEURONTIN) 300 mg capsule   No No   Sig: Take 1 capsule (300 mg total) by mouth 3 (three) times a day   lisinopril (ZESTRIL) 10 mg tablet   No No   Sig: Take 1 tablet (10 mg total) by mouth daily   magnesium oxide (MAG-OX) 400 mg   No No   Sig: Take 1 tablet (400 mg total) by mouth daily metoprolol tartrate (LOPRESSOR) 25 mg tablet   No No   Sig: Take 1 tablet (25 mg total) by mouth every 12 (twelve) hours   pravastatin (PRAVACHOL) 40 mg tablet   No No   Sig: Take 1 tablet (40 mg total) by mouth daily with dinner   ranolazine (RANEXA) 500 mg 12 hr tablet   No No   Sig: Take 1 tablet (500 mg total) by mouth every 12 (twelve) hours   tamsulosin (FLOMAX) 0 4 mg   Yes No   Sig: Take 0 4 mg by mouth daily   thiamine 100 MG tablet   No No   Sig: Take 1 tablet (100 mg total) by mouth daily      Facility-Administered Medications: None     Allergies:  No Known Allergies    ------------------------------------------------------------------------------------------------------------  Advance Directive and Living Will:      Power of :    POLST:    ------------------------------------------------------------------------------------------------------------  Anticipated Length of Stay is > 2 midnights    Care Time Delivered:   Upon my evaluation, this patient had a high probability of imminent or life-threatening deterioration due to hypotension, and alcohol withdrawal, which required my direct attention, intervention, and personal management  I have personally provided 35 minutes (0090 to ) of critical care time, exclusive of procedures, teaching, family meetings, and any prior time recorded by providers other than myself  CHELSEA Medina        Portions of the record may have been created with voice recognition software  Occasional wrong word or "sound a like" substitutions may have occurred due to the inherent limitations of voice recognition software    Read the chart carefully and recognize, using context, where substitutions have occurred

## 2021-04-21 NOTE — ASSESSMENT & PLAN NOTE
· LA 7 1 in setting of ETOH abuse, prolonged vomiting/diarrhea, dehydration, and sepsis  · Received 4L IVF in ED for sepsis   · Repeat LA 2 9   Will trend q2h until clear

## 2021-04-21 NOTE — ED PROVIDER NOTES
History  Chief Complaint   Patient presents with    Weakness - Generalized     Diarrhea, vomitting, not eating for six days,      61-year-old male with past history of alcohol abuse, diabetes, hypertension, hyperlipidemia, COPD, prostate cancer, presents to the ER for evaluation of weakness  Patient was hypotensive in triage with blood pressure of 62/44  Patient is alert and oriented x3 in the ED  Patient states that over the past 6 days he has had 3-4 episodes of watery diarrhea on a daily basis  Patient has also had associated nausea and decreased p o  Intake  Patient lives alone  Patient was found to have bruising along the right side of his body  Patient then stated that he drinks about a qt of vodka on a daily basis  His last drink was yesterday  Patient states that 2 days ago he lost his balance at home and fell on his right side however did not seek any medical attention  History provided by:  Patient      Prior to Admission Medications   Prescriptions Last Dose Informant Patient Reported? Taking?    Blood Glucose Monitoring Suppl (ONE TOUCH ULTRA MINI) w/Device KIT  Self Yes No   Sig: Use as directed   ONETOUCH DELICA LANCETS FINE MISC  Self Yes No   Sig: 3 (three) times a day Test   albuterol (PROVENTIL HFA,VENTOLIN HFA) 90 mcg/act inhaler  Self No No   Sig: Inhale 2 puffs every 4 (four) hours as needed for wheezing   amLODIPine (NORVASC) 5 mg tablet   No No   Sig: Take 1 tablet (5 mg total) by mouth daily   fluticasone-salmeterol (ADVAIR, WIXELA) 500-50 mcg/dose inhaler   No No   Sig: Inhale 1 puff every 12 (twelve) hours   folic acid (FOLVITE) 1 mg tablet   No No   Sig: Take 1 tablet (1 mg total) by mouth daily   gabapentin (NEURONTIN) 300 mg capsule   No No   Sig: Take 1 capsule (300 mg total) by mouth 3 (three) times a day   lisinopril (ZESTRIL) 10 mg tablet   No No   Sig: Take 1 tablet (10 mg total) by mouth daily   magnesium oxide (MAG-OX) 400 mg   No No   Sig: Take 1 tablet (400 mg total) by mouth daily   metoprolol tartrate (LOPRESSOR) 25 mg tablet   No No   Sig: Take 1 tablet (25 mg total) by mouth every 12 (twelve) hours   pravastatin (PRAVACHOL) 40 mg tablet   No No   Sig: Take 1 tablet (40 mg total) by mouth daily with dinner   ranolazine (RANEXA) 500 mg 12 hr tablet   No No   Sig: Take 1 tablet (500 mg total) by mouth every 12 (twelve) hours   tamsulosin (FLOMAX) 0 4 mg   Yes No   Sig: Take 0 4 mg by mouth daily   thiamine 100 MG tablet   No No   Sig: Take 1 tablet (100 mg total) by mouth daily      Facility-Administered Medications: None       Past Medical History:   Diagnosis Date    Cancer [facility])     prostate ca,had radiation    Cardiac disease     stents,then triple bypass    [facility]     Diabetes mellitus ([facility]     ETOH abuse     Hx of heart artery stent     2014    Hyperlipidemia     Hypertension     Pneumonia     Prostate CA ([facility]     Renal disorder     pyelonephritis    S/P CABG x 1     2004       Past Surgical History:   Procedure Laterality Date    CORONARY ARTERY BYPASS GRAFT  2004    DC ARTHRODESIS ANT INTERBODY MIN DISCECTOMY, CERVICAL BELOW C2 N/A 12/16/2020    Procedure: Anterior cervical discectomy with fusion C4-C7; Posterior cervical decompression and fusion C2-T2;  Surgeon: Gina Coon MD;  [facility]: [facility];  Service: Neurosurgery    TONSILLECTOMY         Family History   Problem Relation Age of Onset    Diabetes Mother     Uterine cancer Mother     COPD Father     Hypertension Father      I have reviewed and agree with the history as documented  E-Cigarette/Vaping    E-Cigarette Use Never User      E-Cigarette/Vaping Substances     Social History     Tobacco Use    Smoking status: Current Every Day Smoker     Packs/day: 1 50     Years: 40 00     Pack years: 60 00    Smokeless tobacco: Never Used   Substance Use Topics    Alcohol use:  Yes     Alcohol/week: 10 0 standard drinks     Types: 10 Shots of liquor per week     Frequency: 4 or more times a week     Drinks per session: 10 or more     Binge frequency: Daily or almost daily     Comment: states last drink this am was vodka which is his usual    Drug use: No       Review of Systems   Constitutional: Negative for chills and fever  HENT: Negative for ear pain and sore throat  Eyes: Negative for pain and visual disturbance  Respiratory: Negative for cough and shortness of breath  Cardiovascular: Negative for chest pain and palpitations  Gastrointestinal: Negative for abdominal pain and vomiting  Genitourinary: Negative for dysuria and hematuria  Musculoskeletal: Negative for arthralgias and back pain  Skin: Negative for color change and rash  Neurological: Positive for weakness  Negative for seizures and syncope  All other systems reviewed and are negative  Physical Exam  Physical Exam  Vitals signs and nursing note reviewed  Constitutional:       Appearance: He is well-developed  Comments: Disheveled appearance with dry feces noted to bilateral lower extremities     HENT:      Head: Normocephalic and atraumatic  Eyes:      Conjunctiva/sclera: Conjunctivae normal    Neck:      Musculoskeletal: Neck supple  Cardiovascular:      Rate and Rhythm: Normal rate and regular rhythm  Heart sounds: No murmur  Pulmonary:      Effort: Pulmonary effort is normal  No respiratory distress  Breath sounds: Normal breath sounds  Abdominal:      Palpations: Abdomen is soft  Tenderness: There is no abdominal tenderness  Musculoskeletal:      Comments: Range of motion of upper and lower extremities are intact  Skin:     General: Skin is warm and dry  Comments: Extensive ecchymosis noted to the right side of abdomen, bilateral hips, bilateral knees   Neurological:      Mental Status: He is alert  Comments: No focal neuro deficits noted  Generalized weakness noted throughout           Vital Signs  ED Triage Vitals   Temperature Pulse Respirations Blood Pressure SpO2   04/21/21 1110 04/21/21 1110 04/21/21 1110 04/21/21 1110 04/21/21 1110   97 9 °F (36 6 °C) 82 18 (!) 62/44 100 %      Temp Source Heart Rate Source Patient Position - Orthostatic VS BP Location FiO2 (%)   04/21/21 1110 04/21/21 1110 04/21/21 1110 04/21/21 1110 --   Tympanic Monitor Lying Left arm       Pain Score       04/21/21 1144       3           Vitals:    04/21/21 1315 04/21/21 1330 04/21/21 1345 04/21/21 1400   BP: 98/56 96/54 96/50 90/52   Pulse: 98 96 94 90   Patient Position - Orthostatic VS:             Visual Acuity      ED Medications  Medications   magnesium sulfate 2 g/50 mL IVPB (premix) 2 g (2 g Intravenous New Bag 4/21/21 1247)   lactated ringers bolus 1,000 mL (1,000 mL Intravenous New Bag 4/21/21 1308)   metroNIDAZOLE (FLAGYL) IVPB (premix) 500 mg 100 mL (500 mg Intravenous New Bag 4/21/21 1343)   cefepime (MAXIPIME) 2 g/50 mL dextrose IVPB (0 mg Intravenous Stopped 4/21/21 1343)   vancomycin (VANCOCIN) 1,250 mg in sodium chloride 0 9 % 250 mL IVPB (has no administration in time range)   sodium chloride 0 9 % bolus 1,000 mL (0 mL Intravenous Stopped 4/21/21 1146)   ondansetron (ZOFRAN) injection 4 mg (4 mg Intravenous Given 4/21/21 1134)   sodium chloride 0 9 % bolus 1,000 mL (0 mL Intravenous Stopped 4/21/21 1208)   ondansetron (ZOFRAN) injection 4 mg (4 mg Intravenous Given 4/21/21 1134)   iohexol (OMNIPAQUE) 350 MG/ML injection (SINGLE-DOSE) 100 mL (100 mL Intravenous Given 4/21/21 1230)       Diagnostic Studies  Results Reviewed     Procedure Component Value Units Date/Time    Procalcitonin with AM Reflex [[id number]]     Lab Status: No result Specimen: Blood     Lactic acid, plasma [[id number]]     Lab Status: No result Specimen: Blood     Lactic acid 2 Hours [[id number]]  (Abnormal) Collected: 04/21/21 1317    Lab Status: Final result Specimen: Blood from Arm, Right Updated: 04/21/21 1349     LACTIC ACID 2 9 mmol/L     Narrative: Result may be elevated if tourniquet was used during collection  Phosphorus [[id number]]  (Abnormal) Collected: 04/21/21 1127    Lab Status: Final result Specimen: Blood from Arm, Left Updated: 04/21/21 1337     Phosphorus 2 2 mg/dL     CKMB [[id number]]  (Abnormal) Collected: 04/21/21 1127    Lab Status: Final result Specimen: Blood from Arm, Left Updated: 04/21/21 1317     CK-MB Index 1 0 %      CK-MB 5 1 ng/mL     COVID19, Influenza A/B, RSV PCR, [facility] [[id number]]  (Normal) Collected: 04/21/21 1209    Lab Status: Final result Specimen: Nares from Nasopharyngeal Swab Updated: 04/21/21 1259     SARS-CoV-2 Negative     INFLUENZA A PCR Negative     INFLUENZA B PCR Negative     RSV PCR Negative    Narrative: This test has been authorized by FDA under an EUA (Emergency Use Assay) for use by authorized laboratories  Clinical caution and judgement should be used with the interpretation of these results with consideration of the clinical impression and other laboratory testing  Testing reported as "Positive" or "Negative" has been proven to be accurate according to standard laboratory validation requirements  All testing is performed with control materials showing appropriate reactivity at standard intervals      CK (with reflex to MB) [[id number]]  (Abnormal) Collected: 04/21/21 1127    Lab Status: Final result Specimen: Blood from Arm, Left Updated: 04/21/21 1250     Total  U/L     Magnesium [[id number]]  (Abnormal) Collected: 04/21/21 1127    Lab Status: Final result Specimen: Blood from Arm, Left Updated: 04/21/21 1209     Magnesium 0 8 mg/dL     Lipase [[id number]]  (Normal) Collected: 04/21/21 1127    Lab Status: Final result Specimen: Blood from Arm, Left Updated: 04/21/21 1208     Lipase 128 u/L     TSH, 3rd generation with Free T4 reflex [[id number]]  (Normal) Collected: 04/21/21 1127    Lab Status: Final result Specimen: Blood from Arm, Left Updated: 04/21/21 1208     TSH 3RD GENERATON 1 161 uIU/mL Narrative:      Patients undergoing fluorescein dye angiography may retain small amounts of fluorescein in the body for 48-72 hours post procedure  Samples containing fluorescein can produce falsely depressed TSH values  If the patient had this procedure,a specimen should be resubmitted post fluorescein clearance  Ethanol [[id number]]  (Abnormal) Collected: 04/21/21 1127    Lab Status: Final result Specimen: Blood from Arm, Left Updated: 04/21/21 1208     Ethanol Lvl 210 mg/dL     Comprehensive metabolic panel [[id number]]  (Abnormal) Collected: 04/21/21 1127    Lab Status: Final result Specimen: Blood from Arm, Left Updated: 04/21/21 1208     Sodium 129 mmol/L      Potassium 3 9 mmol/L      Chloride 92 mmol/L      CO2 20 mmol/L      ANION GAP 17 mmol/L      BUN 42 mg/dL      Creatinine 1 97 mg/dL      Glucose 86 mg/dL      Calcium 7 4 mg/dL      Corrected Calcium 8 2 mg/dL       U/L      ALT 96 U/L      Alkaline Phosphatase 191 U/L      Total Protein 6 5 g/dL      Albumin 3 0 g/dL      Total Bilirubin 1 27 mg/dL      eGFR 36 ml/min/1 73sq m     Narrative:      [place] guidelines for Chronic Kidney Disease (CKD):     Stage 1 with normal or high GFR (GFR > 90 mL/min/1 73 square meters)    Stage 2 Mild CKD (GFR = 60-89 mL/min/1 73 square meters)    Stage 3A Moderate CKD (GFR = 45-59 mL/min/1 73 square meters)    Stage 3B Moderate CKD (GFR = 30-44 mL/min/1 73 square meters)    Stage 4 Severe CKD (GFR = 15-29 mL/min/1 73 square meters)    Stage 5 End Stage CKD (GFR <15 mL/min/1 73 square meters)  Note: GFR calculation is accurate only with a steady state creatinine    Lactic acid [[id number]]  (Abnormal) Collected: 04/21/21 1123    Lab Status: Final result Specimen: Blood from Arm, Left Updated: 04/21/21 1200     LACTIC ACID 7 1 mmol/L     Narrative:      Result may be elevated if tourniquet was used during collection      Troponin I [[id number]]  (Normal) Collected: 04/21/21 1123    Lab Status: Final result Specimen: Blood from Arm, Left Updated: 04/21/21 1155     Troponin I 0 03 ng/mL     CBC and differential [[id number]]  (Abnormal) Collected: 04/21/21 1123    Lab Status: Final result Specimen: Blood from Arm, Left Updated: 04/21/21 1150     WBC 8 64 Thousand/uL      RBC 3 20 Million/uL      Hemoglobin 10 3 g/dL      Hematocrit 30 7 %      MCV 96 fL      MCH 32 2 pg      MCHC 33 6 g/dL      RDW 14 3 %      MPV 14 1 fL      Platelets 28 Thousands/uL      nRBC 0 /100 WBCs      Neutrophils Relative 83 %      Immat GRANS % 1 %      Lymphocytes Relative 6 %      Monocytes Relative 9 %      Eosinophils Relative 0 %      Basophils Relative 1 %      Neutrophils Absolute 7 17 Thousands/µL      Immature Grans Absolute 0 12 Thousand/uL      Lymphocytes Absolute 0 52 Thousands/µL      Monocytes Absolute 0 76 Thousand/µL      Eosinophils Absolute 0 03 Thousand/µL      Basophils Absolute 0 04 Thousands/µL     Protime-INR [[id number]]  (Normal) Collected: 04/21/21 1123    Lab Status: Final result Specimen: Blood from Arm, Left Updated: 04/21/21 1144     Protime 13 3 seconds      INR 1 02    APTT [[id number]]  (Normal) Collected: 04/21/21 1123    Lab Status: Final result Specimen: Blood from Arm, Left Updated: 04/21/21 1144     PTT 32 seconds     Blood culture #2 [[id number]] Collected: 04/21/21 1123    Lab Status: In process Specimen: Blood from Arm, Right Updated: 04/21/21 1129    Blood culture #1 [[id number]] Collected: 04/21/21 1123    Lab Status: In process Specimen: Blood from Arm, Left Updated: 04/21/21 1129    UA w Reflex to Microscopic w Reflex to Culture [[id number]]     Lab Status: No result Specimen: Urine                  TRAUMA - CT chest abdomen pelvis w contrast   Final Result by Tamir Black DO (04/21 1336)      1  No acute traumatic injury identified  2   Diffuse severe fatty liver infiltration  Incidental findings as above        Workstation performed: [id number] TRAUMA - CT head wo contrast   Final Result by Odalis Pulido DO (04/21 1301)      No acute intracranial abnormality  Workstation performed: [id number]         TRAUMA - CT spine cervical wo contrast   Final Result by Odalis Pulido DO (04/21 1321)      No cervical spine fracture or traumatic malalignment  Unchanged anterior and posterior cervical fusion as above  No acute hardware complication  Please note the left T1 pedicle screw traverses the upper margin of the adjacent T1-T2 neural foramen  This was seen previously  Correlate for any radiculopathy    in this territory  Increasing fluid in the posterior soft tissues along the upper cervical spine laminectomy bed which could be indicative of pseudomeningocele  Limited evaluation due to hardware artifact and absence of IV contrast       Moderately advanced multilevel neuroforaminal narrowing which is most pronounced at C3-4 on the right  Workstation performed: [id number]         XR knee 1 or 2 vw left    (Results Pending)   XR knee 1 or 2 vw right    (Results Pending)              Procedures  ECG 12 Lead Documentation Only    Date/Time: 4/21/2021 11:14 AM  Performed by: Cecile Tariq DO  Authorized by: Cecile Tariq DO     Indications / Diagnosis:  Weakness  ECG reviewed by me, the ED Provider: yes    Patient location:  ED  Previous ECG:     Previous ECG:  Compared to current    Similarity:  Changes noted    Comparison to cardiac monitor: Yes    Interpretation:     Interpretation: abnormal    Comments:      Sinus rhythm, rate 103, normal axis, normal intervals, no acute ST/T-wave abnormalities noted, sinus tachycardia      CriticalCare Time  Performed by: Cecile Tariq DO  Authorized by: Cecile Tariq DO     Critical care provider statement:     Critical care time (minutes):  90    Critical care time was exclusive of:  Separately billable procedures and treating other patients    Critical care was necessary to treat or prevent imminent or life-threatening deterioration of the following conditions:  Sepsis and shock    Critical care was time spent personally by me on the following activities:  Blood draw for specimens, obtaining history from patient or surrogate, development of treatment plan with patient or surrogate, discussions with consultants, evaluation of patient's response to treatment, examination of patient, review of old charts, re-evaluation of patient's condition, ordering and review of laboratory studies, ordering and review of radiographic studies, interpretation of cardiac output measurements and ordering and performing treatments and interventions             ED Course  ED Course as of Apr 21 1445   Wed Apr 21, 2021   1203 Sepsis alert initiated  1259 Patient was evaluated by ICU team   At this time patient will be admitted to ICU as long as CT scan does not show any traumatic abnormalities  Initial Sepsis Screening     Row Name 04/21/21 1100                Is the patient's history suggestive of a new or worsening infection?  --        Suspected source of infection  suspect infection, source unknown  -KF        Are two or more of the following signs & symptoms of infection both present and new to the patient?  --        Indicate SIRS criteria  Tachycardia > 90 bpm  -KF        If the answer is yes to both questions, suspicion of sepsis is present  --        If severe sepsis is present AND tissue hypoperfusion perists in the hour after fluid resuscitation or lactate > 4, the patient meets criteria for SEPTIC SHOCK  --        Are any of the following organ dysfunction criteria present within 6 hours of suspected infection and SIRS criteria that are NOT considered to be chronic conditions? --        Organ dysfunction  Creatinine > 2 0 mg/dL; Lactate >/equal 4 0 mmol/L;Platelet count < 596,010/MTP  -KF        Date of presentation of severe sepsis  04/21/21  -KF        Time of presentation of severe sepsis  1100  -KF        Tissue hypoperfusion persists in the hour after crystalloid fluid administration, evidenced, by either:  SBP < 90 mm/Hg ( ___ mm/Hg in comment field)  -KF        Was hypotension present within one hour of the conclusion of crystalloid fluid administration?  --        Date of presentation of septic shock  --        Time of presentation of septic shock  --          User Key  (r) = Recorded By, (t) = Taken By, (c) = Cosigned By    Initials Name Provider Type    [facility] [address], DO Physician           Default Flowsheet Data (last 720 hours)      Sepsis Reassess     Row Name 04/21/21 1437                   Repeat Volume Status and Tissue Perfusion Assessment Performed    Repeat Volume Status and Tissue Perfusion Assessment Performed  Yes  -KF           Volume Status and Tissue Perfusion Post Fluid Resuscitation * Must Document All *    Vital Signs Reviewed (HR, RR, BP, T)  Yes  -KF        Shock Index Reviewed  Yes  -KF        Arterial Oxygen Saturation Reviewed (POx, SaO2 or SpO2)  Yes (comment %)  -KF        Cardio  Normal S1/S2  -KF        Pulmonary  Normal effort  -KF        Capillary Refill  Brisk  -KF        Peripheral Pulses  Radial  -KF        Peripheral Pulse  +3  -KF        Skin  Warm  -KF        Urine output assessed  None  -KF           *OR*   Intensive Monitoring- Must Document One of the Following Four *:    Vital Signs Reviewed  --        * Central Venous Pressure (CVP or RAP)  --        * Central Venous Oxygen (SVO2, ScvO2 or Oxygen saturation via central catheter)  --        * Bedside Cardiovascular US in IVC diameter and % collapse  --        * Passive Leg Raise OR Crystalloid Challenge  --          User Key  (r) = Recorded By, (t) = Taken By, (c) = Cosigned By    Initials Name Provider Type    [facility] [address], DO Physician                      MDM  Number of Diagnoses or Management Options  ENMA (acute kidney injury) ([facility] [address] ): new and requires workup  Alcohol intoxication ([facility] ): new and requires workup  Rhabdomyolysis: new and requires workup  Septic shock ([facility] ): new and requires workup  Severe dehydration: new and requires workup  Thrombocytopenia [facility]): new and requires workup  Diagnosis management comments: Start aggressive fluid resuscitation  Continue to monitor patient closely for hemodynamic stability  Obtain septic workup as well as CT head/neck/chest/abdomen/pelvis to rule out any acute traumatic injuries         Amount and/or Complexity of Data Reviewed  Clinical lab tests: reviewed and ordered  Tests in the radiology section of CPT®: ordered and reviewed  Tests in the medicine section of CPT®: ordered and reviewed  Review and summarize past medical records: yes  Independent visualization of images, tracings, or specimens: yes    Risk of Complications, Morbidity, and/or Mortality  General comments: No traumatic injuries noted on radiology results  Patient's blood pressure improved with aggressive fluid resuscitation  Patient had multiple metabolic derangements including lactic acidosis, acute kidney injury, elevated CK level, hypo magnesemia, thrombocytopenia, alcohol intoxication, and severe dehydration  Empiric antibiotics were given the patient was admitted to ICU for further management  Lactic acid level improved with IV fluids    Patient agrees with admission plans    Patient Progress  Patient progress: stable      Disposition  Final diagnoses:   Septic shock ([facility] )   Severe dehydration   Alcohol intoxication ([facility] )   Rhabdomyolysis   Thrombocytopenia ([facility] )   ENMA (acute kidney injury) ([place] [address] )     Time reflects when diagnosis was documented in both MDM as applicable and the Disposition within this note     Time User Action Codes Description Comment    4/21/2021  1:42 PM Bossman Huynh [A41 9,  R65 21] Septic shock ([facility] )     4/21/2021  1:42 PM Angeles Bennett Add [E86 0] Severe dehydration     4/21/2021  1:42 PM Bossman Huynh [F10 929] Alcohol intoxication ([facility] )     4/21/2021  1:42 PM Efraín Alter Add [M62 82] Rhabdomyolysis     4/21/2021  1:42 PM Amie Erwin Add [D69 6] Thrombocytopenia ([facility] )     4/21/2021  1:42 PM Amie Erwin Add [N17 9] ENMA (acute kidney injury) [facility])       ED Disposition     ED Disposition Condition Date/Time Comment    Admit Stable Wed Apr 21, 2021  1:42 PM Case was discussed with ICU AP and the patient's admission status was agreed to be Admission Status: inpatient status to the service of Dr Eugene Pearce  Follow-up Information    None         Patient's Medications   Discharge Prescriptions    No medications on file     No discharge procedures on file      PDMP Review       Value Time User    PDMP Reviewed  Yes 12/21/2020  3:08 PM Dulce Barton PA-C          ED Provider  Electronically Signed by           Cecile Tariq DO  04/21/21 6114

## 2021-04-22 LAB
ALBUMIN SERPL BCP-MCNC: 2.6 G/DL (ref 3.5–5)
ALP SERPL-CCNC: 176 U/L (ref 46–116)
ALT SERPL W P-5'-P-CCNC: 80 U/L (ref 12–78)
ANION GAP SERPL CALCULATED.3IONS-SCNC: 11 MMOL/L (ref 4–13)
ANION GAP SERPL CALCULATED.3IONS-SCNC: 13 MMOL/L (ref 4–13)
ANION GAP SERPL CALCULATED.3IONS-SCNC: 14 MMOL/L (ref 4–13)
AST SERPL W P-5'-P-CCNC: 171 U/L (ref 5–45)
BASE EXCESS BLDA CALC-SCNC: -5.7 MMOL/L
BASE EXCESS BLDA CALC-SCNC: -6.2 MMOL/L
BASOPHILS # BLD AUTO: 0.02 THOUSANDS/ΜL (ref 0–0.1)
BASOPHILS NFR BLD AUTO: 0 % (ref 0–1)
BILIRUB SERPL-MCNC: 0.99 MG/DL (ref 0.2–1)
BODY TEMPERATURE: 98.9 DEGREES FEHRENHEIT
BODY TEMPERATURE: 99.9 DEGREES FEHRENHEIT
BUN SERPL-MCNC: 30 MG/DL (ref 5–25)
BUN SERPL-MCNC: 32 MG/DL (ref 5–25)
BUN SERPL-MCNC: 33 MG/DL (ref 5–25)
C DIFF TOX A+B STL QL IA: POSITIVE
C DIFF TOX B TCDB STL QL NAA+PROBE: POSITIVE
CA-I BLD-SCNC: 0.99 MMOL/L (ref 1.12–1.32)
CA-I BLD-SCNC: 1 MMOL/L (ref 1.12–1.32)
CA-I BLD-SCNC: 1.12 MMOL/L (ref 1.12–1.32)
CALCIUM ALBUM COR SERPL-MCNC: 8.8 MG/DL (ref 8.3–10.1)
CALCIUM SERPL-MCNC: 7.2 MG/DL (ref 8.3–10.1)
CALCIUM SERPL-MCNC: 7.7 MG/DL (ref 8.3–10.1)
CALCIUM SERPL-MCNC: 8.2 MG/DL (ref 8.3–10.1)
CAMPYLOBACTER DNA SPEC NAA+PROBE: NORMAL
CHLORIDE SERPL-SCNC: 100 MMOL/L (ref 100–108)
CHLORIDE SERPL-SCNC: 97 MMOL/L (ref 100–108)
CHLORIDE SERPL-SCNC: 98 MMOL/L (ref 100–108)
CK MB SERPL-MCNC: 2 NG/ML (ref 0–5)
CK MB SERPL-MCNC: <1 % (ref 0–2.5)
CK SERPL-CCNC: 246 U/L (ref 39–308)
CO2 SERPL-SCNC: 19 MMOL/L (ref 21–32)
CO2 SERPL-SCNC: 21 MMOL/L (ref 21–32)
CO2 SERPL-SCNC: 22 MMOL/L (ref 21–32)
CORTIS SERPL-MCNC: 18.3 UG/DL
CREAT SERPL-MCNC: 1.72 MG/DL (ref 0.6–1.3)
CREAT SERPL-MCNC: 1.74 MG/DL (ref 0.6–1.3)
CREAT SERPL-MCNC: 1.91 MG/DL (ref 0.6–1.3)
EOSINOPHIL # BLD AUTO: 0.03 THOUSAND/ΜL (ref 0–0.61)
EOSINOPHIL NFR BLD AUTO: 1 % (ref 0–6)
ERYTHROCYTE [DISTWIDTH] IN BLOOD BY AUTOMATED COUNT: 14.6 % (ref 11.6–15.1)
GFR SERPL CREATININE-BSD FRML MDRD: 38 ML/MIN/1.73SQ M
GFR SERPL CREATININE-BSD FRML MDRD: 42 ML/MIN/1.73SQ M
GFR SERPL CREATININE-BSD FRML MDRD: 43 ML/MIN/1.73SQ M
GLUCOSE SERPL-MCNC: 134 MG/DL (ref 65–140)
GLUCOSE SERPL-MCNC: 159 MG/DL (ref 65–140)
GLUCOSE SERPL-MCNC: 168 MG/DL (ref 65–140)
GLUCOSE SERPL-MCNC: 205 MG/DL (ref 65–140)
GLUCOSE SERPL-MCNC: 73 MG/DL (ref 65–140)
GLUCOSE SERPL-MCNC: 82 MG/DL (ref 65–140)
GLUCOSE SERPL-MCNC: 94 MG/DL (ref 65–140)
HCO3 BLDA-SCNC: 18.4 MMOL/L (ref 22–28)
HCO3 BLDA-SCNC: 18.8 MMOL/L (ref 22–28)
HCT VFR BLD AUTO: 30.7 % (ref 36.5–49.3)
HGB BLD-MCNC: 10.2 G/DL (ref 12–17)
IMM GRANULOCYTES # BLD AUTO: 0.05 THOUSAND/UL (ref 0–0.2)
IMM GRANULOCYTES NFR BLD AUTO: 1 % (ref 0–2)
INR PPP: 1.02 (ref 0.84–1.19)
LYMPHOCYTES # BLD AUTO: 0.87 THOUSANDS/ΜL (ref 0.6–4.47)
LYMPHOCYTES NFR BLD AUTO: 13 % (ref 14–44)
MAGNESIUM SERPL-MCNC: 2.6 MG/DL (ref 1.6–2.6)
MAGNESIUM SERPL-MCNC: 2.8 MG/DL (ref 1.6–2.6)
MCH RBC QN AUTO: 32.2 PG (ref 26.8–34.3)
MCHC RBC AUTO-ENTMCNC: 33.2 G/DL (ref 31.4–37.4)
MCV RBC AUTO: 97 FL (ref 82–98)
MONOCYTES # BLD AUTO: 0.89 THOUSAND/ΜL (ref 0.17–1.22)
MONOCYTES NFR BLD AUTO: 13 % (ref 4–12)
NASAL CANNULA: 2
NASAL CANNULA: 4
NEUTROPHILS # BLD AUTO: 4.8 THOUSANDS/ΜL (ref 1.85–7.62)
NEUTS SEG NFR BLD AUTO: 72 % (ref 43–75)
NRBC BLD AUTO-RTO: 0 /100 WBCS
O2 CT BLDA-SCNC: 13.8 ML/DL (ref 16–23)
O2 CT BLDA-SCNC: 17.2 ML/DL (ref 16–23)
OXYHGB MFR BLDA: 95.2 % (ref 94–97)
OXYHGB MFR BLDA: 96.3 % (ref 94–97)
PCO2 BLDA: 32.9 MM HG (ref 36–44)
PCO2 BLDA: 33.7 MM HG (ref 36–44)
PCO2 TEMP ADJ BLDA: 33.2 MM HG (ref 36–44)
PCO2 TEMP ADJ BLDA: 34.7 MM HG (ref 36–44)
PH BLD: 7.34 [PH] (ref 7.35–7.45)
PH BLD: 7.37 [PH] (ref 7.35–7.45)
PH BLDA: 7.36 [PH] (ref 7.35–7.45)
PH BLDA: 7.37 [PH] (ref 7.35–7.45)
PHOSPHATE SERPL-MCNC: 3.5 MG/DL (ref 2.7–4.5)
PLATELET # BLD AUTO: 23 THOUSANDS/UL (ref 149–390)
PMV BLD AUTO: 12.7 FL (ref 8.9–12.7)
PO2 BLD: 83.9 MM HG (ref 75–129)
PO2 BLD: 90.6 MM HG (ref 75–129)
PO2 BLDA: 80.1 MM HG (ref 75–129)
PO2 BLDA: 89.5 MM HG (ref 75–129)
POTASSIUM SERPL-SCNC: 3.8 MMOL/L (ref 3.5–5.3)
POTASSIUM SERPL-SCNC: 4.3 MMOL/L (ref 3.5–5.3)
POTASSIUM SERPL-SCNC: 4.4 MMOL/L (ref 3.5–5.3)
PROCALCITONIN SERPL-MCNC: 1.27 NG/ML
PROCALCITONIN SERPL-MCNC: 1.32 NG/ML
PROT SERPL-MCNC: 5.9 G/DL (ref 6.4–8.2)
PROTHROMBIN TIME: 13.3 SECONDS (ref 11.6–14.5)
RBC # BLD AUTO: 3.17 MILLION/UL (ref 3.88–5.62)
SALMONELLA DNA SPEC QL NAA+PROBE: NORMAL
SHIGA TOXIN STX GENE SPEC NAA+PROBE: NORMAL
SHIGELLA DNA SPEC QL NAA+PROBE: NORMAL
SODIUM SERPL-SCNC: 130 MMOL/L (ref 136–145)
SODIUM SERPL-SCNC: 132 MMOL/L (ref 136–145)
SODIUM SERPL-SCNC: 133 MMOL/L (ref 136–145)
SPECIMEN SOURCE: ABNORMAL
SPECIMEN SOURCE: ABNORMAL
WBC # BLD AUTO: 6.66 THOUSAND/UL (ref 4.31–10.16)

## 2021-04-22 PROCEDURE — 82550 ASSAY OF CK (CPK): CPT | Performed by: NURSE PRACTITIONER

## 2021-04-22 PROCEDURE — 80053 COMPREHEN METABOLIC PANEL: CPT | Performed by: ANESTHESIOLOGY

## 2021-04-22 PROCEDURE — 84100 ASSAY OF PHOSPHORUS: CPT | Performed by: NURSE PRACTITIONER

## 2021-04-22 PROCEDURE — 82553 CREATINE MB FRACTION: CPT | Performed by: NURSE PRACTITIONER

## 2021-04-22 PROCEDURE — 99233 SBSQ HOSP IP/OBS HIGH 50: CPT | Performed by: NURSE PRACTITIONER

## 2021-04-22 PROCEDURE — 80048 BASIC METABOLIC PNL TOTAL CA: CPT | Performed by: NURSE PRACTITIONER

## 2021-04-22 PROCEDURE — 82948 REAGENT STRIP/BLOOD GLUCOSE: CPT

## 2021-04-22 PROCEDURE — NC001 PR NO CHARGE: Performed by: FAMILY MEDICINE

## 2021-04-22 PROCEDURE — 83735 ASSAY OF MAGNESIUM: CPT | Performed by: NURSE PRACTITIONER

## 2021-04-22 PROCEDURE — 82805 BLOOD GASES W/O2 SATURATION: CPT | Performed by: NURSE PRACTITIONER

## 2021-04-22 PROCEDURE — 82306 VITAMIN D 25 HYDROXY: CPT | Performed by: NURSE PRACTITIONER

## 2021-04-22 PROCEDURE — 82330 ASSAY OF CALCIUM: CPT | Performed by: NURSE PRACTITIONER

## 2021-04-22 PROCEDURE — 85610 PROTHROMBIN TIME: CPT | Performed by: NURSE PRACTITIONER

## 2021-04-22 PROCEDURE — 85025 COMPLETE CBC W/AUTO DIFF WBC: CPT | Performed by: NURSE PRACTITIONER

## 2021-04-22 PROCEDURE — 84145 PROCALCITONIN (PCT): CPT | Performed by: NURSE PRACTITIONER

## 2021-04-22 PROCEDURE — 94760 N-INVAS EAR/PLS OXIMETRY 1: CPT

## 2021-04-22 RX ORDER — RANOLAZINE 500 MG/1
500 TABLET, EXTENDED RELEASE ORAL EVERY 12 HOURS SCHEDULED
Status: DISCONTINUED | OUTPATIENT
Start: 2021-04-23 | End: 2021-04-26 | Stop reason: HOSPADM

## 2021-04-22 RX ORDER — CALCIUM CHLORIDE 100 MG/ML
1 INJECTION INTRAVENOUS; INTRAVENTRICULAR ONCE
Status: DISCONTINUED | OUTPATIENT
Start: 2021-04-22 | End: 2021-04-22

## 2021-04-22 RX ORDER — SODIUM CHLORIDE 9 MG/ML
100 INJECTION, SOLUTION INTRAVENOUS CONTINUOUS
Status: DISCONTINUED | OUTPATIENT
Start: 2021-04-22 | End: 2021-04-23

## 2021-04-22 RX ORDER — SODIUM CHLORIDE, SODIUM GLUCONATE, SODIUM ACETATE, POTASSIUM CHLORIDE, MAGNESIUM CHLORIDE, SODIUM PHOSPHATE, DIBASIC, AND POTASSIUM PHOSPHATE .53; .5; .37; .037; .03; .012; .00082 G/100ML; G/100ML; G/100ML; G/100ML; G/100ML; G/100ML; G/100ML
100 INJECTION, SOLUTION INTRAVENOUS CONTINUOUS
Status: DISCONTINUED | OUTPATIENT
Start: 2021-04-22 | End: 2021-04-22

## 2021-04-22 RX ORDER — CALCIUM GLUCONATE 20 MG/ML
2 INJECTION, SOLUTION INTRAVENOUS ONCE
Status: COMPLETED | OUTPATIENT
Start: 2021-04-22 | End: 2021-04-22

## 2021-04-22 RX ORDER — PHENOBARBITAL SODIUM 65 MG/ML
65 INJECTION INTRAMUSCULAR ONCE
Status: COMPLETED | OUTPATIENT
Start: 2021-04-22 | End: 2021-04-22

## 2021-04-22 RX ADMIN — METRONIDAZOLE 500 MG: 500 INJECTION, SOLUTION INTRAVENOUS at 05:33

## 2021-04-22 RX ADMIN — Medication 12.5 MG: at 21:05

## 2021-04-22 RX ADMIN — CEFEPIME HYDROCHLORIDE 2000 MG: 2 INJECTION, POWDER, FOR SOLUTION INTRAVENOUS at 00:57

## 2021-04-22 RX ADMIN — CEFEPIME HYDROCHLORIDE 2000 MG: 2 INJECTION, POWDER, FOR SOLUTION INTRAVENOUS at 13:30

## 2021-04-22 RX ADMIN — CALCIUM CHLORIDE 1 G: 100 INJECTION INTRAVENOUS; INTRAVENTRICULAR at 07:36

## 2021-04-22 RX ADMIN — Medication 125 MG: at 08:20

## 2021-04-22 RX ADMIN — GABAPENTIN 300 MG: 300 CAPSULE ORAL at 15:25

## 2021-04-22 RX ADMIN — FLUTICASONE FUROATE AND VILANTEROL TRIFENATATE 1 PUFF: 200; 25 POWDER RESPIRATORY (INHALATION) at 08:21

## 2021-04-22 RX ADMIN — GABAPENTIN 300 MG: 300 CAPSULE ORAL at 21:05

## 2021-04-22 RX ADMIN — VANCOMYCIN HYDROCHLORIDE 1000 MG: 1 INJECTION, SOLUTION INTRAVENOUS at 03:15

## 2021-04-22 RX ADMIN — Medication 125 MG: at 18:54

## 2021-04-22 RX ADMIN — Medication 12.5 MG: at 09:57

## 2021-04-22 RX ADMIN — GABAPENTIN 300 MG: 300 CAPSULE ORAL at 08:20

## 2021-04-22 RX ADMIN — CALCIUM GLUCONATE 2 G: 20 INJECTION, SOLUTION INTRAVENOUS at 01:22

## 2021-04-22 RX ADMIN — SODIUM CHLORIDE, SODIUM GLUCONATE, SODIUM ACETATE, POTASSIUM CHLORIDE, MAGNESIUM CHLORIDE, SODIUM PHOSPHATE, DIBASIC, AND POTASSIUM PHOSPHATE 100 ML/HR: .53; .5; .37; .037; .03; .012; .00082 INJECTION, SOLUTION INTRAVENOUS at 05:33

## 2021-04-22 RX ADMIN — Medication 125 MG: at 11:44

## 2021-04-22 RX ADMIN — PHENOBARBITAL SODIUM 65 MG: 65 INJECTION INTRAMUSCULAR at 15:52

## 2021-04-22 RX ADMIN — INSULIN LISPRO 1 UNITS: 100 INJECTION, SOLUTION INTRAVENOUS; SUBCUTANEOUS at 11:52

## 2021-04-22 RX ADMIN — INSULIN LISPRO 1 UNITS: 100 INJECTION, SOLUTION INTRAVENOUS; SUBCUTANEOUS at 06:37

## 2021-04-22 RX ADMIN — VANCOMYCIN HYDROCHLORIDE 1000 MG: 1 INJECTION, SOLUTION INTRAVENOUS at 15:25

## 2021-04-22 RX ADMIN — NICOTINE 21 MG: 21 PATCH, EXTENDED RELEASE TRANSDERMAL at 08:20

## 2021-04-22 RX ADMIN — TAMSULOSIN HYDROCHLORIDE 0.4 MG: 0.4 CAPSULE ORAL at 08:20

## 2021-04-22 RX ADMIN — FOLIC ACID 1 MG: 1 TABLET ORAL at 08:20

## 2021-04-22 RX ADMIN — THIAMINE HCL TAB 100 MG 100 MG: 100 TAB at 08:20

## 2021-04-22 RX ADMIN — SODIUM CHLORIDE 100 ML/HR: 0.9 INJECTION, SOLUTION INTRAVENOUS at 21:27

## 2021-04-22 NOTE — ASSESSMENT & PLAN NOTE
Lab Results   Component Value Date    EGFR 52 04/26/2021    EGFR 48 04/25/2021    EGFR 41 04/24/2021    CREATININE 1 45 (H) 04/26/2021    CREATININE 1 55 (H) 04/25/2021    CREATININE 1 76 (H) 04/24/2021   Patient's creatinine very variable and elevated on admissions with ENMA on chronic kidney disease but baseline appears to be around 1 2 with possible recent baseline bit higher  · Creatinine on admission 1 97 status post IV contrast and 4 L IV fluids for septic shock in ED  · Hypotension likely contributed ENMA along with dehydration  · Monitor I&Os and currently on oral diet  Will consider additional fluids if necessary  · Hold home lisinopril  · Trend BMP daily  · Avoid nephrotoxic agents  · Discontinued all IV fluids

## 2021-04-22 NOTE — ASSESSMENT & PLAN NOTE
· Mild, POA at 129  · Likely hypovolemic hypotonic hyponatremia in setting of dehydration 2/2 diarrhea/poor PO intake   · Improved s/p hydration   132 this AM  · Trend BMP  · AM cortisol level pending, consider adding aldosterone level today

## 2021-04-22 NOTE — ASSESSMENT & PLAN NOTE
At Time of transfer to [facility] sinus tachycardia  Patient is not on anticoagulation outpatient setting due to chronic alcohol use with imbalance and high risk for bleeding  Overnight on day of admission patient was reported by ICU team to have few short runs of atrial fibrillation  · Will continue telemetry  · Continue Home metoprolol 25 mg b i d  Anticoagulation held at this time due to thrombocytopenia

## 2021-04-22 NOTE — ASSESSMENT & PLAN NOTE
Status post C-spine fixation 12/20 with PT/OT and C-collar use OP  Patient reports falling 2 days ago while intoxicated and has multiple contusions on body including forehead  CT spine cervical without contrast on 4/21 showing:  ---No cervical spine fracture or traumatic malalignment  ---Unchanged anterior and posterior cervical fusion as above  No acute hardware complication  Please note the left T1 pedicle screw traverses the upper margin of the adjacent T1-T2 neural foramen  This was seen previously  Correlate for any radiculopathy in this territory  ---Increasing fluid in the posterior soft tissues along the upper cervical spine laminectomy bed which could be indicative of pseudomeningocele  Limited evaluation due to hardware artifact and absence of IV contrast   ---Moderately advanced multilevel neuroforaminal narrowing which is most pronounced at C3-4 on the right  · No focal deficits  Established with Neurosurgery outpatient and will continue to follow-up regarding possible pseudomeningocele

## 2021-04-22 NOTE — QUICK NOTE
I updated the patient's son, Christiano Andrade , today  Patient's son is aware of patient's improved clinical status, C  Diff infection, and transfer out of ICU today  All questions were answered

## 2021-04-22 NOTE — ASSESSMENT & PLAN NOTE
Magnesium level 0 8 on arrival S/P 6 gIV magnesium  4/22 Mag level of 2 6, 4/23 1 6, 4/24 1 6  · Trend daily  · replete as necessary

## 2021-04-22 NOTE — ASSESSMENT & PLAN NOTE
Thrombocytopenia in the past was previously attributed to chronic alcohol use  Unclear etiology this time, likely secondary to sepsis  · Given current platelets at 23 along with poor balance and alcohol abuse with risk of bleeding will hold anticoagulation at this time  · Platelets: 28 > 23 > 24 > 46 > 87>133    Resolving  · Consider transfusion if less than 20 or suspected bleed

## 2021-04-22 NOTE — ASSESSMENT & PLAN NOTE
Patient presented hypotensive with systolic blood pressure in 60s  Time of transfer to med surge floor patient status post 6 5 L of fluid  · Currently on amlodipine 5 mg daily, metoprolol 25 mg q 12h  · Home lisinopril 10 mg daily still being held due to ENMA

## 2021-04-22 NOTE — PROGRESS NOTES
Vancomycin IV Pharmacy-to-Dose Consultation    Juan Jose Cardona is a 63 y o  male who is currently receiving Vancomycin IV with management by the Pharmacy Consult service  Assessment/Plan:  The patient was reviewed  Renal function is stable and no signs or symptoms of nephrotoxicity and/or infusion reactions were documented in the chart  Based on todays assessment, continue current vancomycin (day # 2) dosing of 1000 mg IV q12h, with a plan for trough to be drawn at 1430 on 4/23/21  We will continue to follow the patients culture results and clinical progress daily      Tariq Ji, Pharmacist

## 2021-04-22 NOTE — PROGRESS NOTES
[address] 47  Progress Note Carisravan Candelario 1960, 63 y o  male MRN: [id number]  Unit/Bed#: ICU 02 Encounter: [id number]  Primary Care Provider: Niurka Kent DO   Date and time admitted to hospital: 4/21/2021 11:09 AM    * Hypovolemic shock ([address] )  Assessment & Plan  · Hypotension present on arrival to ED with SBP in 60's initially  · Also with tachypnea, lactic acidosis 7 1, and thrombocytopenia 28  · Likely 2/2 severe dehydration in the setting of ETOH use and multiple days of diarrhea/vomiting/poor PO intake   · Also rules in for SIRS/septic shock   · CXR clear, Covid-19 negative  · CT C/A/P negative for any obvious source of infection  · MRSA swab pending  · UA negative for nitrites, leukocytes  · BC x 2 pending  · Started on empiric cefepime, Flagyl and Vancomycin pending cultures  · Procal elevated at 1 32   · Stool for C-diff positive overnight  Added PO vanco  · SBP initially improved to 90's s/p IVF boluses, but developed hypotension last evening again  TLC and a-line placed, and received another 1000ml fluid bolus and was briefly on levo, now off  · Continue Isolyte at 100  · AM cortisol level pending  · Persistent hypocalcemia overnight despite treatment  See plan below  · Will continue Step down 1 treatment given borderline hypotension    Increased anion gap metabolic acidosis  Assessment & Plan  · Anion gap 17 on arrival in setting of lactic acidosis  Improved overnight to 14, and now closed at 13  · LA cleared last evening  · UA negative for ketones, BG 86  · Serum bicarb 20 on arrival (baseline 25-26)  Remained low overnight at 19, now 21 this AM  · CK mildly elevated at 501  Has had recent falls while using alcohol  Multiple bruises throughout right flank, shoulder, left hip, knees  No limb edema or pain  Unlikely rhabdo   AM   · Continues with diarrhea, likely contributing to acidosis  · Mild ENMA with creat 1 97 on arrival and remains elevated this AM at 1 74  Continue IV hydration  · Trend BMP's as needed      Toxic metabolic encephalopathy  Assessment & Plan  · In setting of chronic alcohol use with ETOH 201 on arrival and s/p phenobarbital loading   · Easily awakens, oriented to place/self  · Neuro checks per protocol  · No focal deficits  · Ammonia low in setting of ETOH abuse/fatty liver disease  · Avoid sedating medications  · Delirium precautions, sleep hygiene, fall precautions    Lactic acidosis-resolved as of 4/21/2021  Assessment & Plan  · LA 7 1 in setting of ETOH abuse, prolonged vomiting/diarrhea, dehydration, and sepsis  · Received 4L IVF in ED for sepsis   · Repeat LA 2 9  Will trend q2h until clear    Acute on chronic kidney failure [facility])  Assessment & Plan  Lab Results   Component Value Date    EGFR 42 04/22/2021    EGFR 43 04/22/2021    EGFR 46 04/21/2021    CREATININE 1 74 (H) 04/22/2021    CREATININE 1 72 (H) 04/22/2021    CREATININE 1 60 (H) 04/21/2021   · Unclear baseline as patient has had multiple admissions with ENMA on CKD  Baseline early 2020 appears 0 9-1 0, though recent baseline seems higher around 1 5  · Admitted with Creat 1 97  S/p IV contrast and 4L IVF's for septic shock in ED  · Remained hypotensive early evening, likely contributing to ENMA  Stabilized s/p 1L IVF and briefly on levo  · Continue Isolyte @100  · Hold home lisinopril   · Trend BMP  AM creat 1 74  · Avoid nephrotoxic drugs and hypotension  Goal MAP >65    Diarrhea  Assessment & Plan  · Presented with c/o 6 days nausea, vomiting, and diarrhea  Continued with diarrhea overnight  · Unable to take anything PO  Has tolerated alcohol  · Contributing to dehydration, acidosis  · CT abdomen negative for any acute findings  · C-diff toxin by PCR positive  PO Vanco added  · IVF's for re-hydration  · Trend BMP  · Stool cultures pending      Alcohol abuse  Assessment & Plan  · Per patient drinks approximately 1 qt of vodka daily   Reports last drink was yesterday  · ETOH level 210 on arrival  · Has historically been admitted for frequent falls when intoxicated  Most recent admission 12/20 required C-spine fixation at B and C-collar OP  · Has previously expressed interest in detox center for assistance  Has tried to stop on his own but begins again when he starts to get shaky  · Currently with moderate tremors on exam  Alert and oriented to self and place only  Reoriented as needed  · S/p phenobarb load last evening  · Follow CIWA scores and give PRN phenobarb if showing further signs of withdrawal  · Case Management for OP Rehab/detox  · Fall precautions    Thrombocytopenia ([facility] [address] )  Assessment & Plan  · Has had thrombocytopenia in past, previously attributed to chronic alcohol abuse  · Most recent hospitalization 12/2020 with levels around 150-250 at discharge, though again low on admission  · Unclear etiology at this time  Possibly with chronic alcohol abuse as contributing factor  · Hold AC at this time given low platelets and high risk for bleeding given poor balance and ETOH abuse  · CBC daily  AM level 23  · Consider transfusion if <20 or S&S of bleeding    Hyponatremia  Assessment & Plan  · Mild, POA at 129  · Likely hypovolemic hypotonic hyponatremia in setting of dehydration 2/2 diarrhea/poor PO intake   · Improved s/p hydration  132 this AM  · Trend BMP  · AM cortisol level pending, consider adding aldosterone level today    Hypomagnesemia  Assessment & Plan  · 0 8 on arrival with hypocalcemia noted as well  Received 6G IV mag last evening  · AM level 2 6  · Trend daily    Hypocalcemia  Assessment & Plan  · Persistent hypocalcemia overnight despite receiving 9G IV calcium gluconate  · AM level 0 99  · Will provide 1G IV Calcium chloride for increased elemental calcium  · No tetany on exam  · Phosphate WNL, hypomagnesemia corrected  No pancreatitis, lipase WNL   Possibly with chronic kidney disease and infectious process/sepsis contributing  · Check Vit D level, PTH level   · q6h I timbo, replete as needed    Cervical spinal stenosis  Assessment & Plan  · S/p c-spine fixation 12/20 with PT/OT and c-collar use OP  · Reports falling 2 days ago while intoxicated  Multiple bruises throughout body, notably on head/forehead  · CT c-spine 4/12 showing: No cervical spine fracture or traumatic malalignment  Unchanged anterior and posterior cervical fusion as above  No acute hardware complication  Please note the left T1 pedicle screw traverses the upper margin of the adjacent T1-T2 neural foramen  This was seen previously  Correlate for any radiculopathy in this territory  Increasing fluid in the posterior soft tissues along the upper cervical spine laminectomy bed which could be indicative of pseudomeningocele  Limited evaluation due to hardware artifact and absence of IV contrast  Moderately advanced multilevel neuroforaminal narrowing which is most pronounced at C3-4 on the right    · No focal deficits  Follow up with Neurosurgery OP regarding above findings of possible meningocele      Transaminitis  Assessment & Plan  · , ALT 80 in setting of ETOH use, dehydration, and hypovolemic shock yesterday through evening  · Likely attributable to above causes  Will avoid hypotension and provide IVF's   · Trend CMP daily  Transaminitis improving overall  History of Atrial fibrillation (HCC)  Assessment & Plan  · NSR to ST on monitor currently  · EKG 4/21 showing NSR with nonspecific ST abnormality, rightward axis  · Few runs of tachycardia overnight to 140's, some appearing irregular  · Not on AC OP 2/2 imbalance, chronic alcohol abuse, and high risk for bleeding  · Continue on cardiac monitor while admitted  · Holding home Metoprolol 25 BID 2/2 hypotension  Would resume at half dose this AM to prevent conversion to afib given history      HLD (hyperlipidemia)  Assessment & Plan  · Continue home statin  · Follow up OP with PCP for monitoring    Nicotine abuse  Assessment & Plan  · 1PPD smoker  · Nicotine patch ordered  · Encourage smoking cessation     COPD (chronic obstructive pulmonary disease) ([facility] )  Assessment & Plan  · Current 1PPD smoker  · Not on home O2  · No wheezing on exam  · Continue home Breo-Ellipta with PRN albuterol  · No acute issues     Diastolic heart failure (HCC)  Assessment & Plan  Wt Readings from Last 3 Encounters:   04/21/21 84 kg (185 lb 3 oz)   04/12/21 82 7 kg (182 lb 4 8 oz)   03/11/21 88 9 kg (196 lb)     · Last ECHO 12/20 showing EF 55-60%  There were no regional wall motion abnormalities  There was moderate concentric hypertrophy  Mild MR, no AS  Pulmonary artery systolic pressure was mildly to moderately increased  Estimated peak PA pressure was 55 mmHg  · Weight is down to 84kg from previously recorded value last week of 82 7kg in setting of dehydration  · Cautious fluid replacement        CAD (coronary artery disease)  Assessment & Plan  · Hx of CAD s/p CABG 2004, PCI to LM 2014  · Initially recommended on plavix/Eliquis but not started post last admission 2/2 frequent falls, high risk of bleeding and chronic alcohol abuse  · Continue home statin  · Holding home metoprolol and Ranolazine given hypotension  Resume once stabilized    Essential (primary) hypertension  Assessment & Plan  · Persistently hypotensive in ED from SBP 60's on arrival to low 90's s/p 4L IVF's  · Hold home amlodipine 5mg QD and lisinopril 10mg QD given hypotension  Resume when stabilized  · Monitor VS per protocol    Type 2 diabetes mellitus ([facility] )  Assessment & Plan  Lab Results   Component Value Date    HGBA1C 5 3 12/07/2020       No results for input(s): POCGLU in the last 72 hours      Blood Sugar Average: Last 72 hrs:  · Per last PCP visit, patient is controlled off of antidiabetic medications  · Monitor AC/HS and add SSI       ----------------------------------------------------------------------------------------  HPI/24hr events: Continued with hypotension overnight after receipt of phenobarb load  Required TLC placement/a-line placement and briefly on levo as high as 9mcg/min, off since 2200  Normotensive this AM with SBP's 's  Tachycardic when awake and continues with mild tremors  Continues with hypocalcemia this AM with ical 0 99  S/p 9 grams calcium gluconate  One episode of diarrhea this AM  Stool positive for c-diff toxin by PCR  PO vanco added  Acidosis improved on AM BMP with bicarb now 21 and AG closed at 13  No other acute changes overnight  Disposition: Continue Stepdown Level 1 level of care   Code Status: Level 1 - Full Code  ---------------------------------------------------------------------------------------  SUBJECTIVE  Reports feeling hungry and thirsty this AM  Also notes increase in shaking/tremors this AM  Abdominal pain improved as compared to yesterday  Offers no other complaints  Expressed that he would like to consider an alcohol detox center after discharge  Review of Systems   Constitutional: Positive for appetite change (Prior decreased appetite, now hungry/no further nausea), fatigue and fever  Negative for chills  HENT: Negative          "dry mouth"   Eyes: Positive for redness  Respiratory: Negative for cough, chest tightness, shortness of breath and wheezing  Cardiovascular: Negative  Gastrointestinal: Positive for abdominal pain (Generalized abdominal pain improved as compared to yesterday  ) and diarrhea (one episode this AM)  Negative for constipation, nausea and vomiting  Endocrine: Negative  Genitourinary: Negative  Musculoskeletal: Negative  Neurological: Positive for tremors and weakness  Negative for facial asymmetry, light-headedness, numbness and headaches  Psychiatric/Behavioral: Negative for agitation and confusion  The patient is not nervous/anxious (denies)  All other systems reviewed and are negative      Review of systems was reviewed and negative unless stated above in HPI/24-hour events ---------------------------------------------------------------------------------------  OBJECTIVE    Vitals   Vitals:    21 0330 21 0400 21 0500 21 0600   BP:  104/63 103/61    Pulse: 76 76 73    Resp: 13 18 14    Temp:  99 9 °F (37 7 °C)     TempSrc:  Temporal     SpO2: 99% 98% 98%    Weight:    86 7 kg (191 lb 2 2 oz)   Height:         Temp (24hrs), Av 1 °F (37 3 °C), Min:97 9 °F (36 6 °C), Max:100 °F (37 8 °C)  Current: Temperature: 99 9 °F (37 7 °C)          Respiratory:  SpO2: SpO2: 98 %, SpO2 Activity: SpO2 Activity: At Rest, SpO2 Device: O2 Device: Nasal cannula  Nasal Cannula O2 Flow Rate (L/min): 2 L/min    Invasive/non-invasive ventilation settings   Respiratory    Lab Data (Last 4 hours)       0540            pH, Arterial       7 355           pCO2, Arterial       33 7           pO2, Arterial       80 1           HCO3, Arterial       18 4           Base Excess, Arterial       -6 2                O2/Vent Data     None                Physical Exam  Vitals signs and nursing note reviewed  Constitutional:       General: He is not in acute distress  Appearance: He is ill-appearing  He is not toxic-appearing or diaphoretic  HENT:      Head: Normocephalic  Comments: Healing ecchymosis on forhead     Right Ear: External ear normal       Left Ear: External ear normal       Nose: Nose normal       Mouth/Throat:      Mouth: Mucous membranes are moist    Eyes:      General: No scleral icterus  Conjunctiva/sclera:      Right eye: Right conjunctiva is injected  Left eye: Left conjunctiva is injected  Pupils: Pupils are equal, round, and reactive to light  Neck:      Musculoskeletal: Neck supple  Cardiovascular:      Rate and Rhythm: Tachycardia present  Pulses: Normal pulses  Radial pulses are 2+ on the right side and 2+ on the left side  Dorsalis pedis pulses are 2+ on the right side and 2+ on the left side        Heart sounds: Normal heart sounds  No murmur  Comments: Tachy when awake  Art line in place on right  Pulmonary:      Effort: Pulmonary effort is normal  No respiratory distress  Breath sounds: No wheezing or rhonchi  Abdominal:      General: Abdomen is protuberant  Bowel sounds are decreased  Palpations: Abdomen is soft  Tenderness: There is generalized abdominal tenderness  Musculoskeletal:         General: No swelling  Right lower leg: No edema  Left lower leg: No edema  Skin:     General: Skin is warm and dry  Comments: Scattered bruising right shoulder, forehead, right flank, left hip, b/l knees   Neurological:      General: No focal deficit present  Mental Status: He is alert and oriented to person, place, and time  GCS: GCS eye subscore is 4  GCS verbal subscore is 5  GCS motor subscore is 6  Psychiatric:         Attention and Perception: Attention normal  He does not perceive auditory or visual hallucinations  Mood and Affect: Mood is not anxious  Affect is blunt  Speech: Speech normal          Behavior: Behavior is cooperative           Laboratory and Diagnostics:  Results from last 7 days   Lab Units 04/22/21  0540 04/21/21  1123   WBC Thousand/uL 6 66 8 64   HEMOGLOBIN g/dL 10 2* 10 3*   HEMATOCRIT % 30 7* 30 7*   PLATELETS Thousands/uL 23* 28*   NEUTROS PCT % 72 83*   MONOS PCT % 13* 9     Results from last 7 days   Lab Units 04/22/21  0540 04/22/21  0003 04/21/21  1558 04/21/21  1127   SODIUM mmol/L 132* 133* 131* 129*   POTASSIUM mmol/L 4 4 4 3 4 2 3 9   CHLORIDE mmol/L 98* 100 98* 92*   CO2 mmol/L 21 19* 18* 20*   ANION GAP mmol/L 13 14* 15* 17*   BUN mg/dL 32* 33* 36* 42*   CREATININE mg/dL 1 74* 1 72* 1 60* 1 97*   CALCIUM mg/dL 7 7* 7 2* 6 6* 7 4*   GLUCOSE RANDOM mg/dL 94 82 94 86   ALT U/L 80*  --   --  96*   AST U/L 171*  --   --  217*   ALK PHOS U/L 176*  --   --  191*   ALBUMIN g/dL 2 6*  --   --  3 0*   TOTAL BILIRUBIN mg/dL 0 99  --   -- 1 27*     Results from last 7 days   Lab Units 04/22/21  0540 04/22/21  0003 04/21/21  1558 04/21/21  1127   MAGNESIUM mg/dL 2 6 2 8* 1 4* 0 8*   PHOSPHORUS mg/dL 3 5  --   --  2 2*      Results from last 7 days   Lab Units 04/22/21  0540 04/21/21  1123   INR  1 02 1 02   PTT seconds  --  32      Results from last 7 days   Lab Units 04/21/21  1123   TROPONIN I ng/mL 0 03     Results from last 7 days   Lab Units 04/21/21  1558 04/21/21  1317 04/21/21  1123   LACTIC ACID mmol/L 1 8 2 9* 7 1*     ABG:  Results from last 7 days   Lab Units 04/22/21  0540   PH ART  7 355   PCO2 ART mm Hg 33 7*   PO2 ART mm Hg 80 1   HCO3 ART mmol/L 18 4*   BASE EXC ART mmol/L -6 2   ABG SOURCE  Line, Arterial     VBG:  Results from last 7 days   Lab Units 04/22/21  0540   ABG SOURCE  Line, Arterial     Results from last 7 days   Lab Units 04/21/21  1814   PROCALCITONIN ng/ml 1 32*       Micro  Results from last 7 days   Lab Units 04/21/21  1611 04/21/21  1123   BLOOD CULTURE   --  Received in Microbiology Lab  Culture in Progress  Received in Microbiology Lab  Culture in Progress  C DIFF TOXIN B BY PCR  Positive*  --        EKG: NSR to ST on monitor at this time  Runs of irregular tachycardia overnight, likely afib runs  Imaging: I have personally reviewed pertinent reports  and I have personally reviewed pertinent films in PACS    Intake and Output  I/O       04/20 0701 - 04/21 0700 04/21 0701 - 04/22 0700 04/22 0701 - 04/23 0700    P  O   810     I V  (mL/kg)  2898 4 (33 4)     IV Piggyback  4700     Total Intake(mL/kg)  8408 4 (97)     Urine (mL/kg/hr)  1850     Stool  0     Total Output  1850     Net  +6558 4            Unmeasured Stool Occurrence  1 x           Height and Weights   Height: 6' 2" (188 cm)  IBW (Ideal Body Weight): 82 2 kg  Body mass index is 24 54 kg/m²    Weight (last 2 days)     Date/Time   Weight    04/22/21 0600   86 7 (191 14)    04/21/21 1500   84 (185 19)    04/21/21 1110   81 6 (180) Nutrition       Diet Orders   (From admission, onward)             Start     Ordered    04/22/21 0712  Diet Cardiovascular; Cardiac  Diet effective now     Question Answer Comment   Diet Type Cardiovascular    Cardiac Cardiac    RD to adjust diet per protocol?  Yes        04/22/21 0711                  Active Medications  Scheduled Meds:  Current Facility-Administered Medications   Medication Dose Route Frequency Provider Last Rate    acetaminophen  650 mg Oral Q6H PRN Maximiliano Malady V, CRNP      albuterol  2 puff Inhalation Q4H PRN Evelena Danger, CRNP      calcium chloride  1 g Intravenous Once Evelena Danger, CRNP 1 g (04/22/21 0736)    cefepime  2,000 mg Intravenous Q12H Evelena Danger, CRNP 2,000 mg (04/22/21 0057)    fluticasone-vilanterol  1 puff Inhalation Daily Evelena Danger, CRNP      folic acid  1 mg Oral Daily Evelena Danger, CRNP      gabapentin  300 mg Oral TID Evelena Danger, CRNP      insulin lispro  1-5 Units Subcutaneous Q6H [address] Evelena Danger, CRNP      metroNIDAZOLE  500 mg Intravenous Q8H Evelena Danger, CRNP 500 mg (04/22/21 0533)    multi-electrolyte  100 mL/hr Intravenous Continuous Radha Spironello V, CRNP 100 mL/hr (04/22/21 0533)    nicotine  21 mg Transdermal Daily Evelena Danger, CRNP      norepinephrine  1-30 mcg/min Intravenous Titrated Valentin Mantle, CRNP Stopped (04/21/21 2210)    ondansetron  4 mg Intravenous Q6H PRN Radha Spironello V, CRNP      pneumococcal 23-valent polysaccharide vaccine  0 5 mL Subcutaneous Prior to discharge Jen Molina MD      tamsulosin  0 4 mg Oral Daily Evelena Danger, CRNP      thiamine  100 mg Oral Daily Evelena Danger, CRNP      vancomycin  12 5 mg/kg Intravenous Q12H Jen Molina MD 1,000 mg (04/22/21 0315)    vancomycin  125 mg Oral Q6H [address] Evelena Danger, CRNP       Continuous Infusions:  multi-electrolyte, 100 mL/hr, Last Rate: 100 mL/hr (04/22/21 0533)  norepinephrine, 1-30 mcg/min, Last Rate: Stopped (04/21/21 2210)      PRN Meds:   acetaminophen, 650 mg, Q6H PRN  albuterol, 2 puff, Q4H PRN  ondansetron, 4 mg, Q6H PRN  pneumococcal 23-valent polysaccharide vaccine, 0 5 mL, Prior to discharge        Invasive Devices Review  Invasive Devices     Central Venous Catheter Line            CVC Central Lines 04/21/21 Triple 16cm less than 1 day          Peripheral Intravenous Line            Peripheral IV 04/21/21 Left Antecubital less than 1 day    Peripheral IV 04/21/21 Left Forearm less than 1 day    Peripheral IV 04/21/21 Right Forearm less than 1 day          Arterial Line            Arterial Line 04/21/21 Radial less than 1 day          Drain            Closed/Suction Drain Posterior;Right Neck Accordion 10 Fr  126 days                Rationale for remaining devices: Critical illness/hypotension  ---------------------------------------------------------------------------------------  Advance Directive and Living Will:      Power of :    POLST:    ---------------------------------------------------------------------------------------  Care Time Delivered:   No Critical Care time spent       [facility], CRNP      Portions of the record may have been created with voice recognition software  Occasional wrong word or "sound a like" substitutions may have occurred due to the inherent limitations of voice recognition software    Read the chart carefully and recognize, using context, where substitutions have occurred

## 2021-04-22 NOTE — PROGRESS NOTES
[facility] Transfer Note - Vesna Diaz 63 y o  male MRN: [id number]    Unit/Bed#: ICU 02 Encounter: [id number]      Assessment/Plan:  * Hypovolemic shock [facility])  Assessment & Plan  Patient presented with hypotension to ED systolic blood pressure in the 60s, tachypnea, with lactic acidosis 7 1 and thrombocytopenia of 28  Likely secondary severe dehydration with setting of alcohol use and multiple days of diarrhea with vomiting and poor oral intake  His chest x-ray showed right lower lobe consolidation  COVID-19 negative  CT chest abdomen pelvis showed diffuse severe fatty liver with no obvious source of infection  UA positive for blood and protein, no nitrites or leukocytes  Procalcitonin elevated at 1 32  Systolic blood pressure improved to 90s status post IV fluid boluses but patient developed have attention evening of admission  TLC in line placed an additional 1 L fluid bolus was given along with briefly placed on levo  Currently off any vaso actives  · Blood cultures x2 pending  · Started on empiric cefepime, Flagyl, and vancomycin pending cultures  · Stool positive for C diff currently continued on p o  Vancomycin  · Monitor fluid losses    Acute on chronic kidney failure [facility])  Assessment & Plan  Lab Results   Component Value Date    EGFR 38 04/22/2021    EGFR 42 04/22/2021    EGFR 43 04/22/2021    CREATININE 1 91 (H) 04/22/2021    CREATININE 1 74 (H) 04/22/2021    CREATININE 1 72 (H) 04/22/2021   Patient's creatinine very variable and elevated on admissions with ENMA on chronic kidney disease but baseline appears to be around 1 2 with possible recent baseline bit higher  · Creatinine on admission 1 97 status post IV contrast and 4 L IV fluids for septic shock in ED  · Hypotension likely contributed ENMA along with dehydration  · Monitor in's and out's and currently on oral diet  Will consider additional fluids if necessary  · Hold home lisinopril  · Trend BMP daily     · Avoid nephrotoxic agents  Diarrhea  Assessment & Plan  Patient had concern for 6 days of nausea vomiting and diarrhea on presentation which continued through night of admission and on day of transfer to [facility]  · CT chest abdomen pelvis showed diffuse fatty liver with no acute findings  · C diff toxin by PCR was positive  P o  Vancomycin added  · Monitor intake and output and consider restarting IV fluids  · Trend BMP daily  · Stool cultures pending  Alcohol abuse  Assessment & Plan  Drinks about 1 qt of vodka daily  Last drink day prior to admission reported  Alcohol level of 210 on arrival   12/20 admission require C-spine fixation at [facility] and C-collar OP  He has failed multiple attempts at cessation and previously expressed interest in detox center  Status post phenobarbital loading on night of admission  · At time of transfer patient observed to have moderate tremors on exam   · CIWA protocol and will consider additional phenobarb recall with showing further signs of withdrawal  · Consult case management for outpatient rehab/detox  · Fall precautions    Essential (primary) hypertension  Assessment & Plan  Patient presented hypotensive with systolic blood pressure in 60s  Time of transfer to [facility] floor patient status post 6 5 L of fluid  · Currently holding home amlodipine 5 mg q day, lisinopril 10 mg q day, and metoprolol 25 mg b i d  Given hypotension  · Will continue to hold lisinopril given ENMA and restart metoprolol at half dosing 12 5 mg b i d  As soon as blood pressure tolerates given history of atrial fibrillation    History of Atrial fibrillation ([facility] [address] )  Assessment & Plan  At Time of transfer to [facility] floor sinus tachycardia  Patient is not on anticoagulation outpatient setting due to chronic alcohol use with imbalance and high risk for bleeding  Overnight on day of admission patient was reported by ICU team to have few short runs of atrial fibrillation  · Will continue telemetry  · Currently holding home metoprolol 25 mg b i d  Due to hypotension  Attempt was made to initiate half dose of total 5 mg b i d  This morning but due to hypotension was held off  Will initiate half dose when possible and eventually increased to full 25 mg b i d  To prevent conversion to AFib given his history      Cervical spinal stenosis  Assessment & Plan  Status post C-spine fixation 12/20 with PT/OT and C-collar use OP  Patient reports falling 2 days ago while intoxicated and has multiple contusions on body including forehead  CT spine cervical without contrast on 4/21 showing:  ---No cervical spine fracture or traumatic malalignment  ---Unchanged anterior and posterior cervical fusion as above  No acute hardware complication  Please note the left T1 pedicle screw traverses the upper margin of the adjacent T1-T2 neural foramen  This was seen previously  Correlate for any radiculopathy in this territory  ---Increasing fluid in the posterior soft tissues along the upper cervical spine laminectomy bed which could be indicative of pseudomeningocele  Limited evaluation due to hardware artifact and absence of IV contrast   ---Moderately advanced multilevel neuroforaminal narrowing which is most pronounced at C3-4 on the right  · Given no focal deficits with follow-up with Neurosurgery outpatient regarding possible pseudomeningocele  Toxic metabolic encephalopathy  Assessment & Plan  Ethanol level of 201 mg/dL on arrival status post phenobarbital loading  At time of transfer patient is alert and oriented to person place and time  · Neuro checks  · Avoid sedating medications  · Delirium precautions, sleep hygiene, fall precautions  · Ammonia level, low less than 10  Transaminitis  Assessment & Plan  AST of 171, ALT 80, with alk phos of 176 alcohol use, dehydration and hypovolemic shock on day of admission    Total of 6 5 L of fluid given pulse far at time of transfer  · Avoid hypotension and trend CMP daily    Hypocalcemia  Assessment & Plan  Persistent despite receiving 9 g IV calcium gluconate  Initial at 0 91  4/22 level of 0 99  After receiving 1 g IV calcium chloride increased to 1 12  Hypomagnesemia corrected  Negative for pancreatitis  · Monitor for symptoms of hypocalcemia  · Vitamin-D panel pending  · Replete as needed        Increased anion gap metabolic acidosis  Assessment & Plan  Anion gap of 17 on arrival with lactic acidosis  Overnight on day of admission improved 14 and subsequently closed  UA was negative for ketones  Status post 6 5 L IV fluid at time of transfer  · Initial CK elevated at 501 with history of recent falls with his alcohol use  Subsequent CK of 246  · Patient still having diarrhea which is likely contributing to his acidosis  · Mild ENMA on admission of 1 97 from baseline about 1 2 with most recent of 1 72   · Trend BMP daily  Diastolic heart failure (HCC)  Assessment & Plan  Wt Readings from Last 3 Encounters:   04/22/21 86 7 kg (191 lb 2 2 oz)   04/12/21 82 7 kg (182 lb 4 8 oz)   03/11/21 88 9 kg (196 lb)     Echo in December 2020 ejection fraction 55-60%, moderate concentric hypertrophy, grade 1 diastolic dysfunction  · Monitor daily weight and caution with replacement        Hyponatremia  Assessment & Plan  Likely hypovolemic hypotonic hyponatremia with dehydration secondary to diarrhea and poor oral intake  · Improved to 132 on 4/22  · Trend BMP daily  · Cortisol level within normal limits    Thrombocytopenia (HCC)  Assessment & Plan  Thrombocytopenia in the past was previously attributed to chronic alcohol use    Unclear allergy this time  · Given current platelets at 23 along with poor balance and alcohol abuse with risk of bleeding will hold anticoagulation at this time  · CBC daily  · Consider transfusion if less than 20 or suspected bleed    Nicotine abuse  Assessment & Plan  1PPD day smoker  · Nicotine patches encouraged smoking cessation    CAD (coronary artery disease)  Assessment & Plan  Coronary disease status post CABG 2004, PCI to LMCA 2014  While recommendation was made initially for Plavix/Eliquis, not started given history of falls, chronic alcohol use and high risk of bleeding  · Continue home statin  · Home metoprolol and ranolazine currently on hold secondary hypotension  Restart once stabilized    HLD (hyperlipidemia)  Assessment & Plan  · Continue home statin    Hypomagnesemia  Assessment & Plan  Magnesium level 0 8 on arrival S/P 6 gIV magnesium  4/22 Mag level of 2 6  · Trend daily    Type 2 diabetes mellitus [facility])  Assessment & Plan  Lab Results   Component Value Date    HGBA1C 5 3 12/07/2020       Recent Labs     04/21/21  2331 04/22/21  0632 04/22/21  1154 04/22/21  1605   POCGLU 73 168* 159* 134       Blood Sugar Average: Last 72 hrs:  (P) 123 2     Last hemoglobin A1c is 5 3 and patient currently controlled off diabetic medications  ISS algorithm 2 ordered pre meal with blood sugar checks t i d     COPD (chronic obstructive pulmonary disease) ([facility] [address] )  Assessment & Plan  1PPD smoker  No home O2  · Continue on home Breo Ellipta and p r n  Albuterol      HPI:  Patient presented with limited history initially given altered mental status and alcohol intoxication  He is 53-year-old male with past medical history of chronic alcohol abuse, frequent falls status post C-spine fixation December 2020, COPD, coronary artery disease status post CABG 2004 and PCI to LMCA 2014, type 2 diabetes mellitus, hyperlipidemia, hypertension, atrial fibrillation history presenting with 6 days of nausea, vomiting, and diarrhea  Was initially reporting inability to tolerate anything by mouth but had consumed alcohol during that time  Simone Pradeep asleep on sidewalk while he was waiting for a taxi to bring him to the hospital   EMS was called by bystanders and patient was brought to ED    On arrival he was hypotensive with systolic blood pressure in the 60s, tachypneac, lactic acid of 7 1, and anion gap of 17 with platelets of 28  His alcohol level was of 201, creatinine 1 97, Mag of 0 8  Met sepsis criteria blood cultures x2 drawn  He was given 4 L of IV fluids due to persistent hypotension and started on empiric antibiotics of cefepime, Flagyl, and vancomycin IV  Systolic blood pressure increased after 4 L and he was accepted to step-down ICU for monitoring    Subjective:   Patient was seen and examined sitting upright in chair while in ICU bed 2  He was shaking mildly and called for bed pain immediately because he had to pass bowel  Bed pan was given and immediately patient requested urine container because he had to urinate as well at the same time  200cc of urine was emptied into urinal and nurse updated  Patient denies any chest pain, shortness of breath on room air, mild abdominal pain  He asked for Imodium to help with diarrhea and was explained that for infectious diarrhea he would accumulate more toxin and it is contraindicated/ would worsen his condition  "This note has been constructed using a voice recognition system  Therefore there may be syntax, spelling, and/or grammatical errors  Please call if you have any questions  "    Objective:     Vitals: Blood pressure 115/67, pulse 90, temperature 98 9 °F (37 2 °C), temperature source Temporal, resp  rate (!) 23, height 6' 2" (1 88 m), weight 86 7 kg (191 lb 2 2 oz), SpO2 91 %  ,Body mass index is 24 54 kg/m²  Wt Readings from Last 3 Encounters:   04/22/21 86 7 kg (191 lb 2 2 oz)   04/12/21 82 7 kg (182 lb 4 8 oz)   03/11/21 88 9 kg (196 lb)       Intake/Output Summary (Last 24 hours) at 4/22/2021 1525  Last data filed at 4/22/2021 1200  Gross per 24 hour   Intake 5758 43 ml   Output 2825 ml   Net 2933 43 ml       Physical Exam:   EXAM LIMITED GIVEN PATIENT WAS PASSING BOWEL AND VERY IRRITATED BY MY PRESENCE    General:  AAOx3, seen to be mildy shaking and sweating, ill appearing  Head: some ecchymosis on forehead  Cardiac: RRR, S1 + S2, no murmur appreciated on ascultation  Pulmonary: Difficult to auscultate lungs given position while passing bowel     Abdomen: mild general tenderness to palpation      Recent Results (from the past 24 hour(s))   UA w Reflex to Microscopic w Reflex to Culture    Collection Time: 04/21/21  3:58 PM    Specimen: Urine, Clean Catch   Result Value Ref Range    Color, UA Light Yellow     Clarity, UA Clear     Specific Gravity, UA <=1 005 1 000 - 1 030    pH, UA 5 5 5 0, 5 5, 6 0, 6 5, 7 0, 7 5, 8 0, 8 5, 9 0    Leukocytes, UA Negative Negative    Nitrite, UA Negative Negative    Protein, UA 30 (1+) (A) Negative mg/dl    Glucose, UA Negative Negative mg/dl    Ketones, UA Negative Negative mg/dl    Urobilinogen, UA 0 2 0 2, 1 0 E U /dl E U /dl    Bilirubin, UA Negative Negative    Blood, UA Large (A) Negative   Basic metabolic panel    Collection Time: 04/21/21  3:58 PM   Result Value Ref Range    Sodium 131 (L) 136 - 145 mmol/L    Potassium 4 2 3 5 - 5 3 mmol/L    Chloride 98 (L) 100 - 108 mmol/L    CO2 18 (L) 21 - 32 mmol/L    ANION GAP 15 (H) 4 - 13 mmol/L    BUN 36 (H) 5 - 25 mg/dL    Creatinine 1 60 (H) 0 60 - 1 30 mg/dL    Glucose 94 65 - 140 mg/dL    Calcium 6 6 (L) 8 3 - 10 1 mg/dL    eGFR 46 ml/min/1 73sq m   Lactic acid, plasma    Collection Time: 04/21/21  3:58 PM   Result Value Ref Range    LACTIC ACID 1 8 0 5 - 2 0 mmol/L   Magnesium    Collection Time: 04/21/21  3:58 PM   Result Value Ref Range    Magnesium 1 4 (L) 1 6 - 2 6 mg/dL   Urine Microscopic    Collection Time: 04/21/21  3:58 PM   Result Value Ref Range    RBC, UA 0-1 None Seen, 0-1, 1-2, 2-4, 0-5 /hpf    WBC, UA 1-2 None Seen, 0-1, 1-2, 0-5, 2-4 /hpf    Epithelial Cells Occasional None Seen, Occasional /hpf    Bacteria, UA Occasional None Seen, Occasional /hpf   Clostridium difficile toxin by PCR with EIA    Collection Time: 04/21/21  4:11 PM    Specimen: Per Rectum; Stool   Result Value Ref Range     C difficile toxin by PCR  Positive (A) Negative    C difficile Toxins A+B, EIA Positive (A) Negative   Stool Enteric Bacterial Panel by PCR    Collection Time: 04/21/21  4:11 PM    Specimen: Rectum; Stool   Result Value Ref Range    Salmonella sp PCR None Detected None Detected    Shigella sp/Enteroinvasive E  coli (EIEC) PCR None Detected None Detected    Campylobacter sp (jejuni and coli) PCR None Detected None Detected    Shiga toxin 1/Shiga toxin 2 genes PCR None Detected None Detected   Fingerstick Glucose (POCT)    Collection Time: 04/21/21  6:07 PM   Result Value Ref Range    POC Glucose 82 65 - 140 mg/dl   Procalcitonin with AM Reflex    Collection Time: 04/21/21  6:14 PM   Result Value Ref Range    Procalcitonin 1 32 (H) <=0 25 ng/ml   Calcium, ionized    Collection Time: 04/21/21  6:14 PM   Result Value Ref Range    Calcium, Ionized 0 91 (L) 1 12 - 1 32 mmol/L   Ammonia    Collection Time: 04/21/21  7:53 PM   Result Value Ref Range    Ammonia <10 (L) 11 - 35 umol/L   Cortisol    Collection Time: 04/21/21  7:53 PM   Result Value Ref Range    Cortisol, Random 18 3 ug/dL   Blood gas, arterial    Collection Time: 04/21/21  9:47 PM   Result Value Ref Range    pH, Arterial 7 300 (L) 7 350 - 7 450    PH ART TC 7 297 (L) 7 350 - 7 450    pCO2, Arterial 31 1 (L) 36 0 - 44 0 mm Hg    PCO2 (TC) Arterial 31 4 (L) 36 0 - 44 0 mm Hg    pO2, Arterial 78 9 75 0 - 129 0 mm Hg    PO2 (TC) Arterial 79 9 75 0 - 129 0 mm Hg    HCO3, Arterial 15 0 (L) 22 0 - 28 0 mmol/L    Base Excess, Arterial -10 3 mmol/L    O2 Content, Arterial 13 6 (L) 16 0 - 23 0 mL/dL    O2 HGB,Arterial  94 2 94 0 - 97 0 %    SOURCE Line, Arterial     ALL TEST Yes     Temperature 98 9 Degrees Fehrenheit    Nasal Cannula 4    Fingerstick Glucose (POCT)    Collection Time: 04/21/21 11:31 PM   Result Value Ref Range    POC Glucose 73 65 - 140 mg/dl   Basic metabolic panel    Collection Time: 04/22/21 12:03 AM   Result Value Ref Range Sodium 133 (L) 136 - 145 mmol/L    Potassium 4 3 3 5 - 5 3 mmol/L    Chloride 100 100 - 108 mmol/L    CO2 19 (L) 21 - 32 mmol/L    ANION GAP 14 (H) 4 - 13 mmol/L    BUN 33 (H) 5 - 25 mg/dL    Creatinine 1 72 (H) 0 60 - 1 30 mg/dL    Glucose 82 65 - 140 mg/dL    Calcium 7 2 (L) 8 3 - 10 1 mg/dL    eGFR 43 ml/min/1 73sq m   Magnesium    Collection Time: 04/22/21 12:03 AM   Result Value Ref Range    Magnesium 2 8 (H) 1 6 - 2 6 mg/dL   Calcium, ionized    Collection Time: 04/22/21 12:03 AM   Result Value Ref Range    Calcium, Ionized 1 00 (L) 1 12 - 1 32 mmol/L   Procalcitonin Reflex    Collection Time: 04/22/21  5:40 AM   Result Value Ref Range    Procalcitonin 1 27 (H) <=0 25 ng/ml   CBC and differential    Collection Time: 04/22/21  5:40 AM   Result Value Ref Range    WBC 6 66 4 31 - 10 16 Thousand/uL    RBC 3 17 (L) 3 88 - 5 62 Million/uL    Hemoglobin 10 2 (L) 12 0 - 17 0 g/dL    Hematocrit 30 7 (L) 36 5 - 49 3 %    MCV 97 82 - 98 fL    MCH 32 2 26 8 - 34 3 pg    MCHC 33 2 31 4 - 37 4 g/dL    RDW 14 6 11 6 - 15 1 %    MPV 12 7 8 9 - 12 7 fL    Platelets 23 (LL) 348 - 390 Thousands/uL    nRBC 0 /100 WBCs    Neutrophils Relative 72 43 - 75 %    Immat GRANS % 1 0 - 2 %    Lymphocytes Relative 13 (L) 14 - 44 %    Monocytes Relative 13 (H) 4 - 12 %    Eosinophils Relative 1 0 - 6 %    Basophils Relative 0 0 - 1 %    Neutrophils Absolute 4 80 1 85 - 7 62 Thousands/µL    Immature Grans Absolute 0 05 0 00 - 0 20 Thousand/uL    Lymphocytes Absolute 0 87 0 60 - 4 47 Thousands/µL    Monocytes Absolute 0 89 0 17 - 1 22 Thousand/µL    Eosinophils Absolute 0 03 0 00 - 0 61 Thousand/µL    Basophils Absolute 0 02 0 00 - 0 10 Thousands/µL   Magnesium    Collection Time: 04/22/21  5:40 AM   Result Value Ref Range    Magnesium 2 6 1 6 - 2 6 mg/dL   Phosphorus    Collection Time: 04/22/21  5:40 AM   Result Value Ref Range    Phosphorus 3 5 2 7 - 4 5 mg/dL   CK (with reflex to MB)    Collection Time: 04/22/21  5:40 AM   Result Value Ref Range    Total  39 - 308 U/L   Comprehensive metabolic panel    Collection Time: 04/22/21  5:40 AM   Result Value Ref Range    Sodium 132 (L) 136 - 145 mmol/L    Potassium 4 4 3 5 - 5 3 mmol/L    Chloride 98 (L) 100 - 108 mmol/L    CO2 21 21 - 32 mmol/L    ANION GAP 13 4 - 13 mmol/L    BUN 32 (H) 5 - 25 mg/dL    Creatinine 1 74 (H) 0 60 - 1 30 mg/dL    Glucose 94 65 - 140 mg/dL    Calcium 7 7 (L) 8 3 - 10 1 mg/dL    Corrected Calcium 8 8 8 3 - 10 1 mg/dL     (H) 5 - 45 U/L    ALT 80 (H) 12 - 78 U/L    Alkaline Phosphatase 176 (H) 46 - 116 U/L    Total Protein 5 9 (L) 6 4 - 8 2 g/dL    Albumin 2 6 (L) 3 5 - 5 0 g/dL    Total Bilirubin 0 99 0 20 - 1 00 mg/dL    eGFR 42 ml/min/1 73sq m   Calcium, ionized    Collection Time: 04/22/21  5:40 AM   Result Value Ref Range    Calcium, Ionized 0 99 (L) 1 12 - 1 32 mmol/L   Protime-INR    Collection Time: 04/22/21  5:40 AM   Result Value Ref Range    Protime 13 3 11 6 - 14 5 seconds    INR 1 02 0 84 - 1 19   Blood gas, arterial    Collection Time: 04/22/21  5:40 AM   Result Value Ref Range    pH, Arterial 7 355 7 350 - 7 450    PH ART TC 7 345 (L) 7 350 - 7 450    pCO2, Arterial 33 7 (L) 36 0 - 44 0 mm Hg    PCO2 (TC) Arterial 34 7 (L) 36 0 - 44 0 mm Hg    pO2, Arterial 80 1 75 0 - 129 0 mm Hg    PO2 (TC) Arterial 83 9 75 0 - 129 0 mm Hg    HCO3, Arterial 18 4 (L) 22 0 - 28 0 mmol/L    Base Excess, Arterial -6 2 mmol/L    O2 Content, Arterial 17 2 16 0 - 23 0 mL/dL    O2 HGB,Arterial  95 2 94 0 - 97 0 %    SOURCE Line, Arterial     Temperature 99 9 Degrees Fehrenheit    Nasal Cannula 4    CKMB    Collection Time: 04/22/21  5:40 AM   Result Value Ref Range    CK-MB Index <1 0 0 0 - 2 5 %    CK-MB 2 0 0 0 - 5 0 ng/mL   Fingerstick Glucose (POCT)    Collection Time: 04/22/21  6:32 AM   Result Value Ref Range    POC Glucose 168 (H) 65 - 140 mg/dl   Basic metabolic panel    Collection Time: 04/22/21 10:04 AM   Result Value Ref Range    Sodium 130 (L) 136 - 145 mmol/L    Potassium 3 8 3 5 - 5 3 mmol/L    Chloride 97 (L) 100 - 108 mmol/L    CO2 22 21 - 32 mmol/L    ANION GAP 11 4 - 13 mmol/L    BUN 30 (H) 5 - 25 mg/dL    Creatinine 1 91 (H) 0 60 - 1 30 mg/dL    Glucose 205 (H) 65 - 140 mg/dL    Calcium 8 2 (L) 8 3 - 10 1 mg/dL    eGFR 38 ml/min/1 73sq m   Calcium, ionized    Collection Time: 04/22/21 10:04 AM   Result Value Ref Range    Calcium, Ionized 1 12 1 12 - 1 32 mmol/L   Blood gas, arterial    Collection Time: 04/22/21 10:10 AM   Result Value Ref Range    pH, Arterial 7 374 7 350 - 7 450    PH ART TC 7 371 7 350 - 7 450    pCO2, Arterial 32 9 (L) 36 0 - 44 0 mm Hg    PCO2 (TC) Arterial 33 2 (L) 36 0 - 44 0 mm Hg    pO2, Arterial 89 5 75 0 - 129 0 mm Hg    PO2 (TC) Arterial 90 6 75 0 - 129 0 mm Hg    HCO3, Arterial 18 8 (L) 22 0 - 28 0 mmol/L    Base Excess, Arterial -5 7 mmol/L    O2 Content, Arterial 13 8 (L) 16 0 - 23 0 mL/dL    O2 HGB,Arterial  96 3 94 0 - 97 0 %    SOURCE Radial, Right     Temperature 98 9 Degrees Fehrenheit    Nasal Cannula 2    Fingerstick Glucose (POCT)    Collection Time: 04/22/21 11:54 AM   Result Value Ref Range    POC Glucose 159 (H) 65 - 140 mg/dl       Current Facility-Administered Medications   Medication Dose Route Frequency Provider Last Rate Last Admin    acetaminophen (TYLENOL) tablet 650 mg  650 mg Oral Q6H PRN CHELSEA Resendiz        albuterol (PROVENTIL HFA,VENTOLIN HFA) inhaler 2 puff  2 puff Inhalation Q4H PRN CHELSEA Resendiz        cefepime (MAXIPIME) 2 g/50 mL dextrose IVPB  2,000 mg Intravenous Q12H CHELSEA Resendiz 100 mL/hr at 04/22/21 1330 2,000 mg at 04/22/21 1330    fluticasone-vilanterol (BREO ELLIPTA) 200-25 MCG/INH inhaler 1 puff  1 puff Inhalation Daily CHELSEA Resendiz   1 puff at 41/57/87 1523    folic acid (FOLVITE) tablet 1 mg  1 mg Oral Daily CHELSEA Resendiz   1 mg at 04/22/21 0820    gabapentin (NEURONTIN) capsule 300 mg  300 mg Oral TID CHELSEA Resendiz   300 mg at 04/22/21 0820    insulin lispro (HumaLOG) 100 units/mL subcutaneous injection 1-5 Units  1-5 Units Subcutaneous TID AC CHELSEA Chavez   1 Units at 04/22/21 1152    nicotine (NICODERM CQ) 21 mg/24 hr TD 24 hr patch 21 mg  21 mg Transdermal Daily CHELSEA Chavez   21 mg at 04/22/21 0820    ondansetron (ZOFRAN) injection 4 mg  4 mg Intravenous Q6H PRN CHELSEA Chavez        pneumococcal 23-valent polysaccharide vaccine (PNEUMOVAX-23) injection 0 5 mL  0 5 mL Subcutaneous Prior to discharge CHELSEA Chavez        [START ON 4/23/2021] ranolazine (RANEXA) 12 hr tablet 500 mg  500 mg Oral Q12H [facility]        tamsulosin (FLOMAX) capsule 0 4 mg  0 4 mg Oral Daily [facility] RandallCHELSEA   0 4 mg at 04/22/21 0820    thiamine tablet 100 mg  100 mg Oral Daily [facility]   100 mg at 04/22/21 0820    vancomycin (VANCOCIN) IVPB (premix in dextrose) 1,000 mg 200 mL  12 5 mg/kg Intravenous Q12H MARIANNE ChavezNP 200 mL/hr at 04/22/21 0315 1,000 mg at 04/22/21 0315    vancomycin (VANCOCIN) oral solution 125 mg  125 mg Oral Q6H [facility] Randall[facility]HELSEA   125 mg at 04/22/21 1144       Invasive Devices     Central Venous Catheter Line            CVC Central Lines 04/21/21 Triple 16cm less than 1 day          Peripheral Intravenous Line            Peripheral IV 04/21/21 Left Antecubital 1 day    Peripheral IV 04/21/21 Left Forearm 1 day    Peripheral IV 04/21/21 Right Forearm 1 day          Drain            Closed/Suction Drain Posterior;Right Neck Accordion 10 Fr  126 days                Lab, Imaging and other studies: I have personally reviewed pertinent reports      VTE Pharmacologic Prophylaxis: Held due to bleeding risk/ fall risk  VTE Mechanical Prophylaxis: sequential compression device    Carroll Christiansen MD

## 2021-04-22 NOTE — PROCEDURES
Arterial Line Insertion    Date/Time: 4/21/2021 9:15 PM  Performed by: CHELSEA Hernandez  Authorized by: CHELSEA Hernandez     Patient location:  Bedside  Other Assisting Provider: No    Consent:     Consent obtained:  Verbal    Consent given by:  Healthcare agent (verbal consent obtained from jeff Taylor via phone by Dr Daria Vazquez)    Risks discussed:  Infection, bleeding, pain, ischemia and repeat procedure  Universal protocol:     Procedure explained and questions answered to patient or proxy's satisfaction: yes      Relevant documents present and verified: yes      Test results available and properly labeled: yes      Radiology Images displayed and confirmed  If images not available, report reviewed: yes      Required blood products, implants, devices, and special equipment available: yes      Site/side marked: yes      Immediately prior to procedure a time out was called: yes      Patient identity confirmed:  Arm band, hospital-assigned identification number and verbally with patient  Indications:     Indications: hemodynamic monitoring    Pre-procedure details:     Skin preparation:  Chlorhexidine    Preparation: Patient was prepped and draped in sterile fashion    Anesthesia (see MAR for exact dosages): Anesthesia method:  Local infiltration    Local anesthetic:  Lidocaine 1% w/o epi  Procedure details:     Location / Tip of Catheter:  Radial    Laterality:  Right    Get's test performed: yes      Get's test abnormal: no      Needle gauge:  20 G    Placement technique:  Percutaneous and ultrasound guided    Ultrasound image availability:  Not saved    Number of attempts:  1    Successful placement: yes      Transducer: waveform confirmed    Post-procedure details:     Post-procedure:  Sutured and sterile dressing applied    CMS:  Normal    Patient tolerance of procedure:   Tolerated well, no immediate complications

## 2021-04-22 NOTE — ASSESSMENT & PLAN NOTE
· , ALT 80 in setting of ETOH use, dehydration, and hypovolemic shock yesterday through evening  · Likely attributable to above causes  Will avoid hypotension and provide IVF's   · Trend CMP daily  Transaminitis improving overall

## 2021-04-22 NOTE — ASSESSMENT & PLAN NOTE
Ethanol level of 201 mg/dL on arrival status post phenobarbital loading  · Neuro checks  · Avoid sedating medications  · Delirium precautions, sleep hygiene, fall precautions  · Ammonia level, low less than 10  · Alert and oriented x3 at this time

## 2021-04-22 NOTE — ASSESSMENT & PLAN NOTE
Persistent despite receiving 9 g IV calcium gluconate  Initial at 0 91  4/22 level of 0 99  After receiving 1 g IV calcium chloride increased to 1 12  Hypomagnesemia corrected  Negative for pancreatitis    · Monitor for symptoms of hypocalcemia  · Vitamin-D panel pending  · Replete as needed

## 2021-04-22 NOTE — ASSESSMENT & PLAN NOTE
Lab Results   Component Value Date    HGBA1C 5 3 12/07/2020     Last hemoglobin A1c is 5 3 and patient currently controlled off diabetic medications  ISS algorithm 2 ordered pre meal with blood sugar checks t i d    Stable

## 2021-04-22 NOTE — PROGRESS NOTES
Report given to [facility] of [facility]  Patient transported by wheelchair by RN  PAtient denies any complaints at this time

## 2021-04-22 NOTE — PROCEDURES
Central Line Insertion    Date/Time: 4/21/2021 8:15 PM  Performed by: CHELSEA Bailey  Authorized by: CHELSEA Bailey     Patient location:  Bedside  Other Assisting Provider: Yes (comment) Trinh Esparza RN )    Consent:     Consent obtained:  Verbal    Consent given by:  Healthcare agent (verbal consent obtained from jeff Prabhakar by Dr Eugene Pearce via phone )    Risks discussed:  Bleeding, incorrect placement, infection, arterial puncture, nerve damage and pneumothorax    Alternatives discussed:  No treatment, delayed treatment and alternative treatment  Universal protocol:     Procedure explained and questions answered to patient or proxy's satisfaction: yes      Relevant documents present and verified: yes      Test results available and properly labeled: yes      Radiology Images displayed and confirmed  If images not available, report reviewed: yes      Required blood products, implants, devices, and special equipment available: yes      Immediately prior to procedure, a time out was called: yes      Patient identity confirmed:  Arm band, hospital-assigned identification number and verbally with patient  Pre-procedure details:     Hand hygiene: Hand hygiene performed prior to insertion      Sterile barrier technique: All elements of maximal sterile technique followed      Skin preparation:  2% chlorhexidine    Skin preparation agent: Skin preparation agent completely dried prior to procedure    Indications:     Central line indications: medications requiring central line    Anesthesia (see MAR for exact dosages):      Anesthesia method:  Local infiltration    Local anesthetic:  Lidocaine 1% w/o epi  Procedure details:     Location:  Right internal jugular    Vessel type: vein      Laterality:  Right    Approach: percutaneous technique used      Patient position:  Trendelenburg    Catheter type:  Triple lumen 16cm    Catheter size:  7 5 Fr    Landmarks identified: yes      Ultrasound guidance: yes      Ultrasound image availability:  Not saved    Sterile ultrasound techniques: Sterile gel and sterile probe covers were used      Manometry confirmation: yes      Number of attempts:  1    Successful placement: yes    Post-procedure details:     Post-procedure:  Line sutured    Assessment:  Blood return through all ports, free fluid flow, no pneumothorax on x-ray and placement verified by x-ray    Post-procedure complications: none      Patient tolerance of procedure:   Tolerated well, no immediate complications

## 2021-04-22 NOTE — ASSESSMENT & PLAN NOTE
· In setting of chronic alcohol use with ETOH 201 on arrival and s/p phenobarbital loading   · Easily awakens, oriented to place/self  · Neuro checks per protocol  · No focal deficits  · Ammonia low in setting of ETOH abuse/fatty liver disease  · Avoid sedating medications  · Delirium precautions, sleep hygiene, fall precautions

## 2021-04-22 NOTE — ASSESSMENT & PLAN NOTE
Anion gap of 17 on arrival with lactic acidosis  Overnight on day of admission improved 14 and subsequently closed  UA was negative for ketones  Status post 6 5 L IV fluid at time of transfer  · Initial CK elevated at 501 with history of recent falls with his alcohol use  Subsequent CK of 246  · Patient still having diarrhea which is likely contributing to his acidosis  · Mild ENMA on admission of 1 97 from baseline, improving but not resolved    See plan under "acute on chronic kidney failure"

## 2021-04-22 NOTE — ASSESSMENT & PLAN NOTE
· 0 8 on arrival with hypocalcemia noted as well   Received 6G IV mag last evening  · AM level 2 6  · Trend daily

## 2021-04-22 NOTE — ASSESSMENT & PLAN NOTE
Patient had concern for 6 days of nausea vomiting and diarrhea on presentation which continued through night of admission and on day of transfer to [facility] surge  Continues to have diarrhea  · CT chest abdomen pelvis showed diffuse fatty liver with no acute findings  · C diff toxin by PCR was positive  P o  Vancomycin 125 mg 10 dose course completed  · More formed stool

## 2021-04-22 NOTE — ASSESSMENT & PLAN NOTE
, ALT 80, with alk phos of 176 alcohol use, dehydration and hypovolemic shock on day of admission  Total of 6 5 L of fluid given pulse far at time of transfer  · Avoid hypotension and trend CMP daily  · AST/ALT/ALP:  171/80/176 > 132/77/230 > 65/58/221 > 37/52/196  Resolving  · Hepatitis-C antibody positive for low reactive  Qualitative hepatitis-C PCR pending  · HIV nonreactive  Peripheral smear showed elevated monocytes and reduced platelets  · Continue to monitor platelets

## 2021-04-22 NOTE — ASSESSMENT & PLAN NOTE
Wt Readings from Last 3 Encounters:   04/25/21 84 kg (185 lb 3 oz)   04/12/21 82 7 kg (182 lb 4 8 oz)   03/11/21 88 9 kg (196 lb)   Echo in December 2020 ejection fraction 55-60%, moderate concentric hypertrophy, grade 1 diastolic dysfunction  · Monitor daily weight and caution with replacement

## 2021-04-22 NOTE — ASSESSMENT & PLAN NOTE
Coronary disease status post CABG 2004, PCI to LMCA 2014  While recommendation was made initially for Plavix/Eliquis, not started given history of falls, chronic alcohol use and high risk of bleeding  · Home statin held due to transaminitis    · Continue metoprolol 25 mg q 12h, ranolazine 500 mg q 12h and amlodipine 5 mg daily

## 2021-04-22 NOTE — ASSESSMENT & PLAN NOTE
Likely hypovolemic hypotonic hyponatremia with dehydration secondary to diarrhea and poor oral intake  · Sodium is stable    · Cortisol level within normal limits

## 2021-04-22 NOTE — PLAN OF CARE
Problem: PAIN - ADULT  Goal: Verbalizes/displays adequate comfort level or baseline comfort level  Description: Interventions:  - Encourage patient to monitor pain and request assistance  - Assess pain using appropriate pain scale  - Administer analgesics based on type and severity of pain and evaluate response  - Implement non-pharmacological measures as appropriate and evaluate response  - Consider cultural and social influences on pain and pain management  - Notify physician/advanced practitioner if interventions unsuccessful or patient reports new pain  Outcome: Progressing     Problem: SAFETY ADULT  Goal: Patient will remain free of falls  Description: INTERVENTIONS:  - Assess patient frequently for physical needs  -  Identify cognitive and physical deficits and behaviors that affect risk of falls    -  [place] fall precautions as indicated by assessment   - Educate patient/family on patient safety including physical limitations  - Instruct patient to call for assistance with activity based on assessment  - Modify environment to reduce risk of injury  - Consider OT/PT consult to assist with strengthening/mobility  Outcome: Progressing  Goal: Maintain or return to baseline ADL function  Description: INTERVENTIONS:  -  Assess patient's ability to carry out ADLs; assess patient's baseline for ADL function and identify physical deficits which impact ability to perform ADLs (bathing, care of mouth/teeth, toileting, grooming, dressing, etc )  - Assess/evaluate cause of self-care deficits   - Assess range of motion  - Assess patient's mobility; develop plan if impaired  - Assess patient's need for assistive devices and provide as appropriate  - Encourage maximum independence but intervene and supervise when necessary  - Involve family in performance of ADLs  - Assess for home care needs following discharge   - Consider OT consult to assist with ADL evaluation and planning for discharge  - Provide patient education as appropriate  Outcome: Progressing  Goal: Maintain or return mobility status to optimal level  Description: INTERVENTIONS:  - Assess patient's baseline mobility status (ambulation, transfers, stairs, etc )    - Identify cognitive and physical deficits and behaviors that affect mobility  - Identify mobility aids required to assist with transfers and/or ambulation (gait belt, sit-to-stand, lift, walker, cane, etc )  - [place] fall precautions as indicated by assessment  - Record patient progress and toleration of activity level on Mobility SBAR; progress patient to next Phase/Stage  - Instruct patient to call for assistance with activity based on assessment  - Consider rehabilitation consult to assist with strengthening/weightbearing, etc   Outcome: Progressing     Problem: DISCHARGE PLANNING  Goal: Discharge to home or other facility with appropriate resources  Description: INTERVENTIONS:  - Identify barriers to discharge w/patient and caregiver  - Arrange for needed discharge resources and transportation as appropriate  - Identify discharge learning needs (meds, wound care, etc )  - Arrange for interpretive services to assist at discharge as needed  - Refer to Case Management Department for coordinating discharge planning if the patient needs post-hospital services based on physician/advanced practitioner order or complex needs related to functional status, cognitive ability, or social support system  Outcome: Progressing     Problem: Knowledge Deficit  Goal: Patient/family/caregiver demonstrates understanding of disease process, treatment plan, medications, and discharge instructions  Description: Complete learning assessment and assess knowledge base    Interventions:  - Provide teaching at level of understanding  - Provide teaching via preferred learning methods  Outcome: Progressing     Problem: NEUROSENSORY - ADULT  Goal: Achieves stable or improved neurological status  Description: INTERVENTIONS  - Monitor and report changes in neurological status  - Monitor vital signs such as temperature, blood pressure, glucose, and any other labs ordered   - Initiate measures to prevent increased intracranial pressure  - Monitor for seizure activity and implement precautions if appropriate      Outcome: Progressing     Problem: GASTROINTESTINAL - ADULT  Goal: Minimal or absence of nausea and/or vomiting  Description: INTERVENTIONS:  - Administer IV fluids if ordered to ensure adequate hydration  - Maintain NPO status until nausea and vomiting are resolved  - Nasogastric tube if ordered  - Administer ordered antiemetic medications as needed  - Provide nonpharmacologic comfort measures as appropriate  - Advance diet as tolerated, if ordered  - Consider nutrition services referral to assist patient with adequate nutrition and appropriate food choices  Outcome: Progressing     Problem: METABOLIC, FLUID AND ELECTROLYTES - ADULT  Goal: Electrolytes maintained within normal limits  Description: INTERVENTIONS:  - Monitor labs and assess patient for signs and symptoms of electrolyte imbalances  - Administer electrolyte replacement as ordered  - Monitor response to electrolyte replacements, including repeat lab results as appropriate  - Instruct patient on fluid and nutrition as appropriate  Outcome: Progressing     Problem: Prexisting or High Potential for Compromised Skin Integrity  Goal: Skin integrity is maintained or improved  Description: INTERVENTIONS:  - Identify patients at risk for skin breakdown  - Assess and monitor skin integrity  - Assess and monitor nutrition and hydration status  - Monitor labs   - Assess for incontinence   - Turn and reposition patient  - Assist with mobility/ambulation  - Relieve pressure over bony prominences  - Avoid friction and shearing  - Provide appropriate hygiene as needed including keeping skin clean and dry  - Evaluate need for skin moisturizer/barrier cream  - Collaborate with interdisciplinary team   - Patient/family teaching  - Consider wound care consult   Outcome: Progressing     Problem: Potential for Falls  Goal: Patient will remain free of falls  Description: INTERVENTIONS:  - Assess patient frequently for physical needs  -  Identify cognitive and physical deficits and behaviors that affect risk of falls  -  [place] fall precautions as indicated by assessment   - Educate patient/family on patient safety including physical limitations  - Instruct patient to call for assistance with activity based on assessment  - Modify environment to reduce risk of injury  - Consider OT/PT consult to assist with strengthening/mobility  Outcome: Progressing     Problem: Nutrition/Hydration-ADULT  Goal: Nutrient/Hydration intake appropriate for improving, restoring or maintaining nutritional needs  Description: Monitor and assess patient's nutrition/hydration status for malnutrition  Collaborate with interdisciplinary team and initiate plan and interventions as ordered  Monitor patient's weight and dietary intake as ordered or per policy  Utilize nutrition screening tool and intervene as necessary  Determine patient's food preferences and provide high-protein, high-caloric foods as appropriate       INTERVENTIONS:  - Monitor oral intake, urinary output, labs, and treatment plans  - Assess nutrition and hydration status and recommend course of action  - Evaluate amount of meals eaten  - Assist patient with eating if necessary   - Allow adequate time for meals  - Recommend/ encourage appropriate diets, oral nutritional supplements, and vitamin/mineral supplements  - Order, calculate, and assess calorie counts as needed  - Recommend, monitor, and adjust tube feedings and TPN/PPN based on assessed needs  - Assess need for intravenous fluids  - Provide specific nutrition/hydration education as appropriate  - Include patient/family/caregiver in decisions related to nutrition  Outcome: Progressing     Problem: RESPIRATORY - ADULT  Goal: Achieves optimal ventilation and oxygenation  Description: INTERVENTIONS:  - Assess for changes in respiratory status  - Assess for changes in mentation and behavior  - Position to facilitate oxygenation and minimize respiratory effort  - Oxygen administered by appropriate delivery if ordered  - Initiate smoking cessation education as indicated  - Encourage broncho-pulmonary hygiene including cough, deep breathe, Incentive Spirometry  - Assess the need for suctioning and aspirate as needed  - Assess and instruct to report SOB or any respiratory difficulty  - Respiratory Therapy support as indicated  Outcome: Progressing

## 2021-04-22 NOTE — ASSESSMENT & PLAN NOTE
· Persistent hypocalcemia overnight despite receiving 9G IV calcium gluconate  · AM level 0 99  · Will provide 1G IV Calcium chloride for increased elemental calcium  · No tetany on exam  · Phosphate WNL, hypomagnesemia corrected  No pancreatitis, lipase WNL   Possibly with chronic kidney disease and infectious process/sepsis contributing  · Check Vit D level, PTH level   · q6h I timbo, replete as needed

## 2021-04-22 NOTE — ASSESSMENT & PLAN NOTE
Drinks about 1 qt of vodka daily  Last drink day prior to admission reported  Alcohol level of 210 on arrival   12/20 admission require C-spine fixation at [facility] and C-collar OP  He has failed multiple attempts at cessation and previously expressed interest in detox center  Status post phenobarbital loading on night of admission  · At time of transfer patient observed to have moderate tremors on exam   · CIWA protocol and will consider additional phenobarb recall with showing further signs of withdrawal- no benzodiazepine required over past 24 hours    · Consulted case management for outpatient rehab/detox but patient declined  · Fall precautions

## 2021-04-22 NOTE — ASSESSMENT & PLAN NOTE
Patient presented with hypotension to ED systolic blood pressure in the 60s, tachypnea, with lactic acidosis 7 1 and thrombocytopenia of 28  Likely secondary severe dehydration with setting of alcohol use and multiple days of diarrhea with vomiting and poor oral intake  His chest x-ray showed right lower lobe consolidation  COVID-19 negative  CT chest abdomen pelvis showed diffuse severe fatty liver with no obvious source of infection  UA positive for blood and protein, no nitrites or leukocytes  Procalcitonin elevated at 1 32  Systolic blood pressure improved to 90s status post IV fluid boluses but patient developed have attention evening of admission  TLC in line placed an additional 1 L fluid bolus was given along with briefly placed on levo  Currently off any vaso actives  · Blood cultures x2 show no growth at 72 hours, await final result  · Patient completed IV antibiotic course  · Stool positive for C diff - completed vancomycin 125 mg 10 dose course  · Monitor fluid status and I&Os  · Stable  · PT and OT recommend short-term rehab  Awaiting placement

## 2021-04-23 ENCOUNTER — APPOINTMENT (INPATIENT)
Dept: RADIOLOGY | Facility: HOSPITAL | Age: 61
DRG: 871 | End: 2021-04-23
Payer: MEDICARE

## 2021-04-23 LAB
ALBUMIN SERPL BCP-MCNC: 2.6 G/DL (ref 3.5–5)
ALP SERPL-CCNC: 237 U/L (ref 46–116)
ALT SERPL W P-5'-P-CCNC: 92 U/L (ref 12–78)
ANION GAP SERPL CALCULATED.3IONS-SCNC: 12 MMOL/L (ref 4–13)
AST SERPL W P-5'-P-CCNC: 243 U/L (ref 5–45)
BASOPHILS # BLD MANUAL: 0 THOUSAND/UL (ref 0–0.1)
BASOPHILS NFR MAR MANUAL: 0 % (ref 0–1)
BILIRUB DIRECT SERPL-MCNC: 0.54 MG/DL (ref 0–0.2)
BILIRUB SERPL-MCNC: 1.03 MG/DL (ref 0.2–1)
BUN SERPL-MCNC: 28 MG/DL (ref 5–25)
CA-I BLD-SCNC: 1.09 MMOL/L (ref 1.12–1.32)
CALCIUM SERPL-MCNC: 8.3 MG/DL (ref 8.3–10.1)
CHLORIDE SERPL-SCNC: 98 MMOL/L (ref 100–108)
CO2 SERPL-SCNC: 24 MMOL/L (ref 21–32)
CREAT SERPL-MCNC: 1.84 MG/DL (ref 0.6–1.3)
EOSINOPHIL # BLD MANUAL: 0.09 THOUSAND/UL (ref 0–0.4)
EOSINOPHIL NFR BLD MANUAL: 2 % (ref 0–6)
ERYTHROCYTE [DISTWIDTH] IN BLOOD BY AUTOMATED COUNT: 14 % (ref 11.6–15.1)
GFR SERPL CREATININE-BSD FRML MDRD: 39 ML/MIN/1.73SQ M
GLUCOSE SERPL-MCNC: 149 MG/DL (ref 65–140)
GLUCOSE SERPL-MCNC: 86 MG/DL (ref 65–140)
GLUCOSE SERPL-MCNC: 87 MG/DL (ref 65–140)
GLUCOSE SERPL-MCNC: 95 MG/DL (ref 65–140)
HCT VFR BLD AUTO: 29 % (ref 36.5–49.3)
HGB BLD-MCNC: 9.6 G/DL (ref 12–17)
LYMPHOCYTES # BLD AUTO: 0.92 THOUSAND/UL (ref 0.6–4.47)
LYMPHOCYTES # BLD AUTO: 20 % (ref 14–44)
MAGNESIUM SERPL-MCNC: 1.6 MG/DL (ref 1.6–2.6)
MCH RBC QN AUTO: 32.3 PG (ref 26.8–34.3)
MCHC RBC AUTO-ENTMCNC: 33.1 G/DL (ref 31.4–37.4)
MCV RBC AUTO: 98 FL (ref 82–98)
MONOCYTES # BLD AUTO: 0.74 THOUSAND/UL (ref 0–1.22)
MONOCYTES NFR BLD: 16 % (ref 4–12)
MRSA NOSE QL CULT: NORMAL
NEUTROPHILS # BLD MANUAL: 2.86 THOUSAND/UL (ref 1.85–7.62)
NEUTS BAND NFR BLD MANUAL: 4 % (ref 0–8)
NEUTS SEG NFR BLD AUTO: 58 % (ref 43–75)
NRBC BLD AUTO-RTO: 0 /100 WBCS
PHOSPHATE SERPL-MCNC: 1.7 MG/DL (ref 2.7–4.5)
PLATELET # BLD AUTO: 24 THOUSANDS/UL (ref 149–390)
PLATELET BLD QL SMEAR: ABNORMAL
PMV BLD AUTO: 12 FL (ref 8.9–12.7)
POTASSIUM SERPL-SCNC: 3.6 MMOL/L (ref 3.5–5.3)
PROT SERPL-MCNC: 5.9 G/DL (ref 6.4–8.2)
RBC # BLD AUTO: 2.97 MILLION/UL (ref 3.88–5.62)
RBC MORPH BLD: NORMAL
SODIUM SERPL-SCNC: 134 MMOL/L (ref 136–145)
TOTAL CELLS COUNTED SPEC: 100
WBC # BLD AUTO: 4.62 THOUSAND/UL (ref 4.31–10.16)

## 2021-04-23 PROCEDURE — 85007 BL SMEAR W/DIFF WBC COUNT: CPT | Performed by: NURSE PRACTITIONER

## 2021-04-23 PROCEDURE — 97530 THERAPEUTIC ACTIVITIES: CPT

## 2021-04-23 PROCEDURE — 85027 COMPLETE CBC AUTOMATED: CPT | Performed by: NURSE PRACTITIONER

## 2021-04-23 PROCEDURE — 97163 PT EVAL HIGH COMPLEX 45 MIN: CPT

## 2021-04-23 PROCEDURE — 82948 REAGENT STRIP/BLOOD GLUCOSE: CPT

## 2021-04-23 PROCEDURE — 99233 SBSQ HOSP IP/OBS HIGH 50: CPT | Performed by: FAMILY MEDICINE

## 2021-04-23 PROCEDURE — 86803 HEPATITIS C AB TEST: CPT | Performed by: STUDENT IN AN ORGANIZED HEALTH CARE EDUCATION/TRAINING PROGRAM

## 2021-04-23 PROCEDURE — 83735 ASSAY OF MAGNESIUM: CPT | Performed by: NURSE PRACTITIONER

## 2021-04-23 PROCEDURE — 80048 BASIC METABOLIC PNL TOTAL CA: CPT | Performed by: NURSE PRACTITIONER

## 2021-04-23 PROCEDURE — 84100 ASSAY OF PHOSPHORUS: CPT | Performed by: NURSE PRACTITIONER

## 2021-04-23 PROCEDURE — 76705 ECHO EXAM OF ABDOMEN: CPT

## 2021-04-23 PROCEDURE — 87389 HIV-1 AG W/HIV-1&-2 AB AG IA: CPT | Performed by: STUDENT IN AN ORGANIZED HEALTH CARE EDUCATION/TRAINING PROGRAM

## 2021-04-23 PROCEDURE — 80076 HEPATIC FUNCTION PANEL: CPT | Performed by: STUDENT IN AN ORGANIZED HEALTH CARE EDUCATION/TRAINING PROGRAM

## 2021-04-23 PROCEDURE — 82330 ASSAY OF CALCIUM: CPT | Performed by: NURSE PRACTITIONER

## 2021-04-23 PROCEDURE — 87340 HEPATITIS B SURFACE AG IA: CPT | Performed by: STUDENT IN AN ORGANIZED HEALTH CARE EDUCATION/TRAINING PROGRAM

## 2021-04-23 RX ORDER — CALCIUM GLUCONATE 20 MG/ML
2 INJECTION, SOLUTION INTRAVENOUS ONCE
Status: COMPLETED | OUTPATIENT
Start: 2021-04-23 | End: 2021-04-23

## 2021-04-23 RX ORDER — POTASSIUM CHLORIDE 20 MEQ/1
40 TABLET, EXTENDED RELEASE ORAL ONCE
Status: COMPLETED | OUTPATIENT
Start: 2021-04-23 | End: 2021-04-23

## 2021-04-23 RX ORDER — POTASSIUM CHLORIDE AND SODIUM CHLORIDE 450; 150 MG/100ML; MG/100ML
50 INJECTION, SOLUTION INTRAVENOUS CONTINUOUS
Status: DISCONTINUED | OUTPATIENT
Start: 2021-04-23 | End: 2021-04-24

## 2021-04-23 RX ORDER — MAGNESIUM SULFATE HEPTAHYDRATE 40 MG/ML
4 INJECTION, SOLUTION INTRAVENOUS ONCE
Status: COMPLETED | OUTPATIENT
Start: 2021-04-23 | End: 2021-04-23

## 2021-04-23 RX ORDER — POTASSIUM CHLORIDE AND SODIUM CHLORIDE 450; 150 MG/100ML; MG/100ML
50 INJECTION, SOLUTION INTRAVENOUS CONTINUOUS
Status: DISCONTINUED | OUTPATIENT
Start: 2021-04-23 | End: 2021-04-23 | Stop reason: SDUPTHER

## 2021-04-23 RX ORDER — CALCIUM GLUCONATE 20 MG/ML
1 INJECTION, SOLUTION INTRAVENOUS ONCE
Status: COMPLETED | OUTPATIENT
Start: 2021-04-23 | End: 2021-04-23

## 2021-04-23 RX ADMIN — Medication 125 MG: at 12:48

## 2021-04-23 RX ADMIN — METOPROLOL TARTRATE 25 MG: 25 TABLET, FILM COATED ORAL at 21:42

## 2021-04-23 RX ADMIN — FLUTICASONE FUROATE AND VILANTEROL TRIFENATATE 1 PUFF: 200; 25 POWDER RESPIRATORY (INHALATION) at 12:49

## 2021-04-23 RX ADMIN — TAMSULOSIN HYDROCHLORIDE 0.4 MG: 0.4 CAPSULE ORAL at 10:49

## 2021-04-23 RX ADMIN — POTASSIUM CHLORIDE 40 MEQ: 1500 TABLET, EXTENDED RELEASE ORAL at 10:46

## 2021-04-23 RX ADMIN — CALCIUM GLUCONATE 2 G: 20 INJECTION, SOLUTION INTRAVENOUS at 11:12

## 2021-04-23 RX ADMIN — GABAPENTIN 300 MG: 300 CAPSULE ORAL at 16:55

## 2021-04-23 RX ADMIN — Medication 125 MG: at 05:31

## 2021-04-23 RX ADMIN — POTASSIUM CHLORIDE AND SODIUM CHLORIDE 50 ML/HR: 450; 150 INJECTION, SOLUTION INTRAVENOUS at 14:17

## 2021-04-23 RX ADMIN — GABAPENTIN 300 MG: 300 CAPSULE ORAL at 21:42

## 2021-04-23 RX ADMIN — Medication 125 MG: at 17:41

## 2021-04-23 RX ADMIN — FOLIC ACID 1 MG: 1 TABLET ORAL at 10:47

## 2021-04-23 RX ADMIN — SODIUM PHOSPHATE, MONOBASIC, MONOHYDRATE 21 MMOL: 276; 142 INJECTION, SOLUTION INTRAVENOUS at 13:11

## 2021-04-23 RX ADMIN — MAGNESIUM SULFATE HEPTAHYDRATE 4 G: 40 INJECTION, SOLUTION INTRAVENOUS at 10:33

## 2021-04-23 RX ADMIN — RANOLAZINE 500 MG: 500 TABLET, FILM COATED, EXTENDED RELEASE ORAL at 10:48

## 2021-04-23 RX ADMIN — Medication 125 MG: at 00:10

## 2021-04-23 RX ADMIN — NICOTINE 21 MG: 21 PATCH, EXTENDED RELEASE TRANSDERMAL at 10:45

## 2021-04-23 RX ADMIN — CALCIUM GLUCONATE 1 G: 20 INJECTION, SOLUTION INTRAVENOUS at 10:36

## 2021-04-23 RX ADMIN — Medication 125 MG: at 23:50

## 2021-04-23 RX ADMIN — GABAPENTIN 300 MG: 300 CAPSULE ORAL at 10:47

## 2021-04-23 RX ADMIN — RANOLAZINE 500 MG: 500 TABLET, FILM COATED, EXTENDED RELEASE ORAL at 21:43

## 2021-04-23 RX ADMIN — SODIUM CHLORIDE 100 ML/HR: 0.9 INJECTION, SOLUTION INTRAVENOUS at 05:31

## 2021-04-23 RX ADMIN — THIAMINE HCL TAB 100 MG 100 MG: 100 TAB at 10:48

## 2021-04-23 NOTE — PROGRESS NOTES
[facility] [facility] Practice Progress Note - Kamlesh Escobar 63 y o  male MRN: [id number]    Unit/Bed#: [address] [place] [address] Encounter: [id number]      Assessment/Plan:  * Hypovolemic shock [facility])  Assessment & Plan  Patient presented with hypotension to ED systolic blood pressure in the 60s, tachypnea, with lactic acidosis 7 1 and thrombocytopenia of 28  Likely secondary severe dehydration with setting of alcohol use and multiple days of diarrhea with vomiting and poor oral intake  His chest x-ray showed right lower lobe consolidation  COVID-19 negative  CT chest abdomen pelvis showed diffuse severe fatty liver with no obvious source of infection  UA positive for blood and protein, no nitrites or leukocytes  Procalcitonin elevated at 1 32  Systolic blood pressure improved to 90s status post IV fluid boluses but patient developed have attention evening of admission  TLC in line placed an additional 1 L fluid bolus was given along with briefly placed on levo  Currently off any vaso actives  · Blood cultures x2 pending  · Started on empiric cefepime, Flagyl, and vancomycin pending cultures  Meera Nissen BCx X 2 negative at 24 hrs  DC'd cefepime, flagyl, and IV Vanco  · Stool positive for C diff currently continued on p o  Vancomycin  · Monitor fluid losses    Acute on chronic kidney failure [facility])  Assessment & Plan  Lab Results   Component Value Date    EGFR 38 04/22/2021    EGFR 42 04/22/2021    EGFR 43 04/22/2021    CREATININE 1 91 (H) 04/22/2021    CREATININE 1 74 (H) 04/22/2021    CREATININE 1 72 (H) 04/22/2021   Patient's creatinine very variable and elevated on admissions with ENMA on chronic kidney disease but baseline appears to be around 1 2 with possible recent baseline bit higher  · Creatinine on admission 1 97 status post IV contrast and 4 L IV fluids for septic shock in ED  · Hypotension likely contributed ENMA along with dehydration  · Monitor in's and out's and currently on oral diet    Will consider additional fluids if necessary  · Hold home lisinopril  · Trend BMP daily  · Avoid nephrotoxic agents  Currentyl giving NS 100cc/hr with Cr 4/23 1 84        Diarrhea  Assessment & Plan  Patient had concern for 6 days of nausea vomiting and diarrhea on presentation which continued through night of admission and on day of transfer to [facility]  · CT chest abdomen pelvis showed diffuse fatty liver with no acute findings  · C diff toxin by PCR was positive  P o  Vancomycin added  · Monitor intake and output and consider restarting IV fluids  · Trend BMP daily  · Stool cultures pending  Alcohol abuse  Assessment & Plan  Drinks about 1 qt of vodka daily  Last drink day prior to admission reported  Alcohol level of 210 on arrival   12/20 admission require C-spine fixation at [facility] and C-collar OP  He has failed multiple attempts at cessation and previously expressed interest in detox center  Status post phenobarbital loading on night of admission  · At time of transfer patient observed to have moderate tremors on exam   · CIWA protocol and will consider additional phenobarb recall with showing further signs of withdrawal  · Consult case management for outpatient rehab/detox  · Fall precautions    Essential (primary) hypertension  Assessment & Plan  Patient presented hypotensive with systolic blood pressure in 60s  Time of transfer to [facility] floor patient status post 6 5 L of fluid  · Currently holding home amlodipine 5 mg q day, lisinopril 10 mg q day, and metoprolol 25 mg b i d  Given hypotension  · Will continue to hold lisinopril given ENMA and restart metoprolol at half dosing 12 5 mg b i d  Metoprolol restarted last night 4/22 at 12 5mg BID and blood pressure subsequently dropped   Will continue to monitor on current dosage with IV fluids and will consider restarting to full dose 25mg BID later 4/23    History of Atrial fibrillation [facility])  Assessment & Plan  At Time of transfer to [facility] floor sinus tachycardia  Patient is not on anticoagulation outpatient setting due to chronic alcohol use with imbalance and high risk for bleeding  Overnight on day of admission patient was reported by ICU team to have few short runs of atrial fibrillation  · Will continue telemetry  · Currently holding home metoprolol 25 mg b i d  Due to hypotension  Attempt was made to initiate half dose of total 5 mg b i d  This morning but due to hypotension was held off  Will initiate half dose when possible and eventually increased to full 25 mg b i d  To prevent conversion to AFib given his history      Cervical spinal stenosis  Assessment & Plan  Status post C-spine fixation 12/20 with PT/OT and C-collar use OP  Patient reports falling 2 days ago while intoxicated and has multiple contusions on body including forehead  CT spine cervical without contrast on 4/21 showing:  ---No cervical spine fracture or traumatic malalignment  ---Unchanged anterior and posterior cervical fusion as above  No acute hardware complication  Please note the left T1 pedicle screw traverses the upper margin of the adjacent T1-T2 neural foramen  This was seen previously  Correlate for any radiculopathy in this territory  ---Increasing fluid in the posterior soft tissues along the upper cervical spine laminectomy bed which could be indicative of pseudomeningocele  Limited evaluation due to hardware artifact and absence of IV contrast   ---Moderately advanced multilevel neuroforaminal narrowing which is most pronounced at C3-4 on the right  · Given no focal deficits with follow-up with Neurosurgery outpatient regarding possible pseudomeningocele  Toxic metabolic encephalopathy  Assessment & Plan  Ethanol level of 201 mg/dL on arrival status post phenobarbital loading  At time of transfer patient is alert and oriented to person place and time    · Neuro checks  · Avoid sedating medications  · Delirium precautions, sleep hygiene, fall precautions  · Ammonia level, low less than 10  Transaminitis  Assessment & Plan  AST of 171, ALT 80, with alk phos of 176 alcohol use, dehydration and hypovolemic shock on day of admission  Total of 6 5 L of fluid given pulse far at time of transfer  · Avoid hypotension and trend CMP daily    Hypocalcemia  Assessment & Plan  Persistent despite receiving 9 g IV calcium gluconate  Initial at 0 91  4/22 level of 0 99  After receiving 1 g IV calcium chloride increased to 1 12  Hypomagnesemia corrected  Negative for pancreatitis  · Monitor for symptoms of hypocalcemia  · Vitamin-D panel pending  · Replete as needed        Increased anion gap metabolic acidosis  Assessment & Plan  Anion gap of 17 on arrival with lactic acidosis  Overnight on day of admission improved 14 and subsequently closed  UA was negative for ketones  Status post 6 5 L IV fluid at time of transfer  · Initial CK elevated at 501 with history of recent falls with his alcohol use  Subsequent CK of 246  · Patient still having diarrhea which is likely contributing to his acidosis  · Mild ENMA on admission of 1 97 from baseline about 1 2 with most recent of 1 72   · Trend BMP daily  Diastolic heart failure (HCC)  Assessment & Plan  Wt Readings from Last 3 Encounters:   04/22/21 86 7 kg (191 lb 2 2 oz)   04/12/21 82 7 kg (182 lb 4 8 oz)   03/11/21 88 9 kg (196 lb)     Echo in December 2020 ejection fraction 55-60%, moderate concentric hypertrophy, grade 1 diastolic dysfunction  · Monitor daily weight and caution with replacement        Hyponatremia  Assessment & Plan  Likely hypovolemic hypotonic hyponatremia with dehydration secondary to diarrhea and poor oral intake  · Improved to 132 on 4/22  · Trend BMP daily  · Cortisol level within normal limits  · 134 on 4/23 and currently on 100cc/hr NS    Thrombocytopenia (HCC)  Assessment & Plan  Thrombocytopenia in the past was previously attributed to chronic alcohol use    Unclear allergy this time  · Given current platelets at 23 along with poor balance and alcohol abuse with risk of bleeding will hold anticoagulation at this time  · CBC daily  · Consider transfusion if less than 20 or suspected bleed    Nicotine abuse  Assessment & Plan  1PPD day smoker  · Nicotine patches encouraged smoking cessation    CAD (coronary artery disease)  Assessment & Plan  Coronary disease status post CABG 2004, PCI to LMCA 2014  While recommendation was made initially for Plavix/Eliquis, not started given history of falls, chronic alcohol use and high risk of bleeding  · Continue home statin  · Home metoprolol and ranolazine currently on hold secondary hypotension  Restart once stabilized    HLD (hyperlipidemia)  Assessment & Plan  · Continue home statin    Hypomagnesemia  Assessment & Plan  Magnesium level 0 8 on arrival S/P 6 gIV magnesium  4/22 Mag level of 2 6, 4/23 1 6  · Trend daily  · replete as necessary    Type 2 diabetes mellitus [facility])  Assessment & Plan  Lab Results   Component Value Date    HGBA1C 5 3 12/07/2020       Recent Labs     04/21/21  2331 04/22/21  0632 04/22/21  1154 04/22/21  1605   POCGLU 73 168* 159* 134       Blood Sugar Average: Last 72 hrs:  (P) 123 2     Last hemoglobin A1c is 5 3 and patient currently controlled off diabetic medications  ISS algorithm 2 ordered pre meal with blood sugar checks t i d     COPD (chronic obstructive pulmonary disease) ([facility] [address] )  Assessment & Plan  1PPD smoker  No home O2  · Continue on home Breo Ellipta and p r n  Albuterol      Subjective:   Patient was seen and examined at bedside in no acute distress  Denies chest pain, shortness of breath on room air, mild bilateral upper quadrant abdominal pain  Patient states his diarrhea is somewhat improved since yesterday and is starting to be more formed  Denies any blood in stool however nurse mentioned this morning that night nurse report dark stool       "This note has been constructed using a voice recognition system  Therefore there may be syntax, spelling, and/or grammatical errors  Please call if you have any questions  "    Objective:     Vitals: Blood pressure 147/86, pulse 102, temperature 97 5 °F (36 4 °C), temperature source Oral, resp  rate 18, height 6' 2" (1 88 m), weight 85 9 kg (189 lb 6 oz), SpO2 100 %  ,Body mass index is 24 31 kg/m²    Wt Readings from Last 3 Encounters:   04/23/21 85 9 kg (189 lb 6 oz)   04/12/21 82 7 kg (182 lb 4 8 oz)   03/11/21 88 9 kg (196 lb)       Intake/Output Summary (Last 24 hours) at 4/23/2021 1017  Last data filed at 4/23/2021 0936  Gross per 24 hour   Intake 1306 67 ml   Output 1800 ml   Net -493 33 ml       Physical Exam:   General:  AAOx3, in no acute distress, mildly diaphoretic  Cardiac: RRR, S1 + S2, no murmur appreciated on ascultation  Pulmonary: CTAB upper and lower fields  Abdomen: + BS, soft, mild tenderness to palpation bilateral upper quadrants  Extremities: distal pulses intact, mild lower extremity edema present  Neuro: Distal extremity sensation intact bilaterally      Recent Results (from the past 24 hour(s))   Fingerstick Glucose (POCT)    Collection Time: 04/22/21 11:54 AM   Result Value Ref Range    POC Glucose 159 (H) 65 - 140 mg/dl   Fingerstick Glucose (POCT)    Collection Time: 04/22/21  4:05 PM   Result Value Ref Range    POC Glucose 134 65 - 140 mg/dl   CBC and differential    Collection Time: 04/23/21  6:00 AM   Result Value Ref Range    WBC 4 62 4 31 - 10 16 Thousand/uL    RBC 2 97 (L) 3 88 - 5 62 Million/uL    Hemoglobin 9 6 (L) 12 0 - 17 0 g/dL    Hematocrit 29 0 (L) 36 5 - 49 3 %    MCV 98 82 - 98 fL    MCH 32 3 26 8 - 34 3 pg    MCHC 33 1 31 4 - 37 4 g/dL    RDW 14 0 11 6 - 15 1 %    MPV 12 0 8 9 - 12 7 fL    Platelets 24 (LL) 304 - 390 Thousands/uL    nRBC 0 /100 WBCs   Basic metabolic panel    Collection Time: 04/23/21  6:00 AM   Result Value Ref Range    Sodium 134 (L) 136 - 145 mmol/L    Potassium 3 6 3 5 - 5 3 mmol/L    Chloride 98 (L) 100 - 108 mmol/L    CO2 24 21 - 32 mmol/L    ANION GAP 12 4 - 13 mmol/L    BUN 28 (H) 5 - 25 mg/dL    Creatinine 1 84 (H) 0 60 - 1 30 mg/dL    Glucose 95 65 - 140 mg/dL    Calcium 8 3 8 3 - 10 1 mg/dL    eGFR 39 ml/min/1 73sq m   Magnesium    Collection Time: 04/23/21  6:00 AM   Result Value Ref Range    Magnesium 1 6 1 6 - 2 6 mg/dL   Phosphorus    Collection Time: 04/23/21  6:00 AM   Result Value Ref Range    Phosphorus 1 7 (L) 2 7 - 4 5 mg/dL   Calcium, ionized    Collection Time: 04/23/21  6:00 AM   Result Value Ref Range    Calcium, Ionized 1 09 (L) 1 12 - 1 32 mmol/L   Manual Differential(PHLEBS Do Not Order)    Collection Time: 04/23/21  6:00 AM   Result Value Ref Range    Segmented % 58 43 - 75 %    Bands % 4 0 - 8 %    Lymphocytes % 20 14 - 44 %    Monocytes % 16 (H) 4 - 12 %    Eosinophils, % 2 0 - 6 %    Basophils % 0 0 - 1 %    Absolute Neutrophils 2 86 1 85 - 7 62 Thousand/uL    Lymphocytes Absolute 0 92 0 60 - 4 47 Thousand/uL    Monocytes Absolute 0 74 0 00 - 1 22 Thousand/uL    Eosinophils Absolute 0 09 0 00 - 0 40 Thousand/uL    Basophils Absolute 0 00 0 00 - 0 10 Thousand/uL    Total Counted 100     RBC Morphology Normal     Platelet Estimate Decreased (A) Adequate   Hepatic function panel    Collection Time: 04/23/21  6:00 AM   Result Value Ref Range    Total Bilirubin 1 03 (H) 0 20 - 1 00 mg/dL    Bilirubin, Direct 0 54 (H) 0 00 - 0 20 mg/dL    Alkaline Phosphatase 237 (H) 46 - 116 U/L     (H) 5 - 45 U/L    ALT 92 (H) 12 - 78 U/L    Total Protein 5 9 (L) 6 4 - 8 2 g/dL    Albumin 2 6 (L) 3 5 - 5 0 g/dL   Fingerstick Glucose (POCT)    Collection Time: 04/23/21  7:23 AM   Result Value Ref Range    POC Glucose 86 65 - 140 mg/dl       Current Facility-Administered Medications   Medication Dose Route Frequency Provider Last Rate Last Admin    acetaminophen (TYLENOL) tablet 650 mg  650 mg Oral Q6H PRN CHELSEA Barlow        albuterol (PROVENTIL HFA,VENTOLIN HFA) inhaler 2 puff  2 puff Inhalation Q4H PRN Melodi Dance, CRNP        calcium gluconate 1 g in sodium chloride 0 9% 50 mL (premix)  1 g Intravenous Once Clifton Berman MD        Followed by   Layton Pert calcium gluconate 2 g in sodium chloride 0 9% 100 mL (premix)  2 g Intravenous Once Javier Agustin MD        fluticasone-vilanterol (BREO ELLIPTA) 200-25 MCG/INH inhaler 1 puff  1 puff Inhalation Daily Melodi Dance, CRNP   1 puff at 95/47/97 0062    folic acid (FOLVITE) tablet 1 mg  1 mg Oral Daily Shi Dansteve, CRNP   1 mg at 04/22/21 0820    gabapentin (NEURONTIN) capsule 300 mg  300 mg Oral TID Melodi Dance, CRNP   300 mg at 04/22/21 2105    insulin lispro (HumaLOG) 100 units/mL subcutaneous injection 1-5 Units  1-5 Units Subcutaneous TID AC Melodi Dance, CRNP   1 Units at 04/22/21 1152    magnesium sulfate 4 g/100 mL IVPB (premix) 4 g  4 g Intravenous Once Porterphyashley Garcia MD        metoprolol tartrate (LOPRESSOR) partial tablet 12 5 mg  12 5 mg Oral Q12H [facility] Clifton Garcia MD   12 5 mg at 04/22/21 2105    nicotine (NICODERM CQ) 21 mg/24 hr TD 24 hr patch 21 mg  21 mg Transdermal Daily Shi Dance, CRNP   21 mg at 04/22/21 0820    ondansetron (ZOFRAN) injection 4 mg  4 mg Intravenous Q6H PRN Melodi Dance, CRNP        pneumococcal 23-valent polysaccharide vaccine (PNEUMOVAX-23) injection 0 5 mL  0 5 mL Subcutaneous Prior to discharge Melodi Dance, CRNP        potassium chloride (K-DUR,KLOR-CON) CR tablet 40 mEq  40 mEq Oral Once Clifton Garcia MD        ranolazine (RANEXA) 12 hr tablet 500 mg  500 mg Oral Q12H [facility] Melodi Dance, CRNP        sodium chloride 0 9 % infusion  100 mL/hr Intravenous Continuous Glorine Greening [place],  mL/hr at 04/23/21 0531 100 mL/hr at 04/23/21 0531    tamsulosin (FLOMAX) capsule 0 4 mg  0 4 mg Oral Daily Shi Dance, CRNP   0 4 mg at 04/22/21 0820    thiamine tablet 100 mg  100 mg Oral Daily Shi Dance, CRNP   100 mg at 04/22/21 0820    vancomycin [facility]) oral solution 125 mg  125 mg Oral Q6H [address] [place] CHELSEA [place]   125 mg at 04/23/21 0531       Invasive Devices     Peripheral Intravenous Line            Peripheral IV 04/23/21 Left Arm less than 1 day          Drain            Closed/Suction Drain Posterior;Right Neck Accordion 10 Fr  127 days    External Urinary Catheter Large less than 1 day                Lab, Imaging and other studies: I have personally reviewed pertinent reports      VTE Pharmacologic Prophylaxis: Reason for no pharmacologic prophylaxis Thrombocytopenia with Plt level 24 along with high risk of fall and bleed  VTE Mechanical Prophylaxis: sequential compression device    Lexis Tinoco MD

## 2021-04-23 NOTE — CONSULTS
The patient's vancomycin therapy has been discontinued  Pharmacy will sign off now, thank you for this consult       Radha Hooper, YanD

## 2021-04-23 NOTE — PHYSICAL THERAPY NOTE
PT EVALUATION       04/23/21 0946   PT Last Visit   PT Visit Date 04/23/21   Note Type   Note type Evaluation   Pain Assessment   Pain Assessment Tool 0-10   Pain Score No Pain   Home Living   Type of Home Apartment   Home Layout One level;Elevator   Bathroom Shower/Tub Tub/shower unit   [address] [facility]   Additional Comments Pt lives alone; leaves his door unlocked so his neighbors can occasionally do a "wellness check " on him  Prior Function   Level of [place] Independent with ADLs and functional mobility   Lives With Alone   Receives Help From Neighbor;Friend(s)   ADL Assistance Independent   IADLs Needs assistance   Falls in the last 6 months 1 to 4   Comments Pt takes a bus , does not drive, neighbors sometimes give him a ride  Takes bus to shopping  Restrictions/Precautions   Weight Bearing Precautions Per Order No   Other Precautions Contact/isolation; Chair Alarm; Bed Alarm;O2;Fall Risk   General   Additional Pertinent History admitted with Rhabodomyolysis, Alcohol intoxication, weakness, ENMA, Septic Shock   Family/Caregiver Present No   Cognition   Overall Cognitive Status WFL   Arousal/Participation Cooperative   Orientation Level Oriented X4   Following Commands Follows multistep commands with increased time or repetition   RUE Assessment   RUE Assessment WFL   LUE Assessment   LUE Assessment WFL   RLE Assessment   RLE Assessment WFL  (hip/knee grossly 3+/5; ankle: 2+/5)   LLE Assessment   LLE Assessment WFL  (3+/5 grossly)   Bed Mobility   Rolling R 7  Independent   Rolling L 7  Independent   Supine to Sit 5  Supervision   Additional items Verbal cues   Sit to Supine 5  Supervision   Additional items Verbal cues   Transfers   Sit to Stand 4  Minimal assistance   Additional items Verbal cues  (bedraised slightly)   Stand to Sit 5  Supervision   Additional items Verbal cues; Bed elevated   Additional Comments Pt sat back on bed without warning after standing for a few minutes Ambulation/Elevation   Gait pattern   (unsteady)   Gait Assistance 3  Moderate assist   Additional items Verbal cues; Tactile cues; Assist x 2   Assistive Device Rolling walker   Distance few steps   Balance   Static Sitting Fair   Dynamic Sitting Fair -   Static Standing Fair -  (wtih RW)   Dynamic Standing Poor +  (with RW)   Ambulatory Poor  (with RW)   Activity Tolerance   Activity Tolerance Patient limited by fatigue;Treatment limited secondary to medical complications (Comment)   Nurse Made Aware yes: Sakina   Assessment   Prognosis Good   Problem List Decreased strength;Decreased endurance; Impaired balance;Decreased mobility; Decreased coordination; Impaired judgement;Decreased safety awareness   Assessment Patient seen for Physical Therapy evaluation  Patient admitted with Hypovolemic shock ([facility] [address] )  Comorbidities affecting patient's physical performance include: HTN, DM, CAD>s/p CABG 2004, s/p ACDF 12/2020, ETOH abuse  Personal factors affecting patient at time of initial evaluation include: lives in 6  story apartment building, ambulating with assistive device, inability to ambulate household distances, inability to navigate community distances, inability to navigate level surfaces without external assistance, limited home support, positive fall history, inability to perform ADLS and inability to perform IADLS   Prior to admission, patient was   Please find objective findings from Physical Therapy assessment regarding body systems outlined above with impairments and limitations including weakness, impaired balance, decreased endurance, impaired coordination, gait deviations, decreased activity tolerance, decreased functional mobility tolerance, decreased safety awareness, impaired judgement and fall risk  The Barthel Index was used as a functional outcome tool presenting with a score of 35 today indicating marked limitations of functional mobility and ADLS    Patient's clinical presentation is currently unstable/unpredictable as seen in patient's presentation of increased fall risk, new onset of impairment of functional mobility, decreased endurance and new onset of weakness  Pt would benefit from continued Physical Therapy treatment to address deficits as defined above and maximize level of functional mobility  As demonstrated by objective findings, the assigned level of complexity for this evaluation is high  The patient's AM-PAC Basic Mobility Inpatient Short Form Raw Score is 16, Standardized Score is 38 32  A standardized score less than 42 9 suggests the patient may benefit from discharge to post-acute rehabilitation services  Please also refer to the recommendation of the Physical Therapist for safe discharge planning  Goals   Patient Goals to walk    STG Expiration Date 04/30/21   Short Term Goal #1 Min A of 1 for all transfers sit <> stand with RW   Short Term Goal #2 Min A of 1 for amb  with RW for 30 feet with fair balance  LTG Expiration Date 05/07/21   Long Term Goal #1 Indep with transfers sit <> stand with RW and fair + balance   Long Term Goal #2 CG/Mod Ind  for amb  with RW functional household distance with fair + or better balance   Plan   Treatment/Interventions Functional transfer training;LE strengthening/ROM; Therapeutic exercise; Endurance training;Patient/family training;Equipment eval/education;Gait training;Spoke to nursing;Spoke to case management;OT   PT Frequency 5x/wk   Recommendation   PT Discharge Recommendation Post acute rehabilitation services   AM-PAC Basic Mobility Inpatient   Turning in Bed Without Bedrails 4   Lying on Back to Sitting on Edge of Flat Bed 4   Moving Bed to Chair 2   Standing Up From Chair 3   Walk in Room 2   Climb 3-5 Stairs 1   Basic Mobility Inpatient Raw Score 16   Basic Mobility Standardized Score 38 32   Barthel Index   Feeding 10   Bathing 0   Grooming Score 0   Dressing Score 5   Bladder Score 0   Bowels Score 10   Toilet Use Score 5   Transfers (Bed/Chair) Score 5   Mobility (Level Surface) Score 0   Stairs Score 0   Barthel Index Score 35   Licensure   [place] License Number  Radha Gutierrez PT  [id number]   PT TREATMENT  9:35-9:45  10 min  S: Pt reports that he has been using his walker for the past few weeks "since my neck collar came off"  Pt reports that he was not moving much for the weeks post op, then was weak and needed the walker   O:Pt completed: seated hip flexion,LAQs and APs at EOB prior to standing  Sit > stand to RW with Mod A of 1 and Min A of 1  Pt stood for several minutes while wgt shifting and working on marching in place at side of bed for safety  Pt sidestepped along bed with Mod A and verbal cues with RW Pt plopped down onto bed without warning due to loss of balance when taking steps  Pt returned to bed with all needs in reach and bed alarm activated  RN made aware of same  A: Tolerated fairly well  Very weak in LEs  Can hold his weight , but poor endurance for taking steps and decreased safety awareness  P: Cont to progress; would recommend a second skilled therapist to assist for safety until pt improves with balance and endurance    CKF

## 2021-04-23 NOTE — PLAN OF CARE
Problem: PAIN - ADULT  Goal: Verbalizes/displays adequate comfort level or baseline comfort level  Description: Interventions:  - Encourage patient to monitor pain and request assistance  - Assess pain using appropriate pain scale  - Administer analgesics based on type and severity of pain and evaluate response  - Implement non-pharmacological measures as appropriate and evaluate response  - Consider cultural and social influences on pain and pain management  - Notify physician/advanced practitioner if interventions unsuccessful or patient reports new pain  4/22/2021 2333 by Deandra Medrano RN  Outcome: Progressing  4/22/2021 2333 by Deandra Medrano RN  Outcome: Progressing     Problem: SAFETY ADULT  Goal: Patient will remain free of falls  Description: INTERVENTIONS:  - Assess patient frequently for physical needs  -  Identify cognitive and physical deficits and behaviors that affect risk of falls    -  [place] fall precautions as indicated by assessment   - Educate patient/family on patient safety including physical limitations  - Instruct patient to call for assistance with activity based on assessment  - Modify environment to reduce risk of injury  - Consider OT/PT consult to assist with strengthening/mobility  4/22/2021 2333 by Deandra Medrano RN  Outcome: Progressing  4/22/2021 2333 by Deandra Medrano RN  Outcome: Progressing  Goal: Maintain or return to baseline ADL function  Description: INTERVENTIONS:  -  Assess patient's ability to carry out ADLs; assess patient's baseline for ADL function and identify physical deficits which impact ability to perform ADLs (bathing, care of mouth/teeth, toileting, grooming, dressing, etc )  - Assess/evaluate cause of self-care deficits   - Assess range of motion  - Assess patient's mobility; develop plan if impaired  - Assess patient's need for assistive devices and provide as appropriate  - Encourage maximum independence but intervene and supervise when necessary  - Involve family in performance of ADLs  - Assess for home care needs following discharge   - Consider OT consult to assist with ADL evaluation and planning for discharge  - Provide patient education as appropriate  4/22/2021 2333 by Zarina Gilliland RN  Outcome: Progressing  4/22/2021 2333 by Zarina Gilliland RN  Outcome: Progressing  Goal: Maintain or return mobility status to optimal level  Description: INTERVENTIONS:  - Assess patient's baseline mobility status (ambulation, transfers, stairs, etc )    - Identify cognitive and physical deficits and behaviors that affect mobility  - Identify mobility aids required to assist with transfers and/or ambulation (gait belt, sit-to-stand, lift, walker, cane, etc )  - [place] fall precautions as indicated by assessment  - Record patient progress and toleration of activity level on Mobility SBAR; progress patient to next Phase/Stage  - Instruct patient to call for assistance with activity based on assessment  - Consider rehabilitation consult to assist with strengthening/weightbearing, etc   4/22/2021 2333 by Zarina Gilliland RN  Outcome: Progressing  4/22/2021 2333 by Zarina Gilliland RN  Outcome: Progressing     Problem: DISCHARGE PLANNING  Goal: Discharge to home or other facility with appropriate resources  Description: INTERVENTIONS:  - Identify barriers to discharge w/patient and caregiver  - Arrange for needed discharge resources and transportation as appropriate  - Identify discharge learning needs (meds, wound care, etc )  - Arrange for interpretive services to assist at discharge as needed  - Refer to Case Management Department for coordinating discharge planning if the patient needs post-hospital services based on physician/advanced practitioner order or complex needs related to functional status, cognitive ability, or social support system  4/22/2021 2333 by Zarina Gilliland RN  Outcome: Progressing  4/22/2021 2333 by Narda Macdonald Preston Fonseca RN  Outcome: Progressing     Problem: Knowledge Deficit  Goal: Patient/family/caregiver demonstrates understanding of disease process, treatment plan, medications, and discharge instructions  Description: Complete learning assessment and assess knowledge base    Interventions:  - Provide teaching at level of understanding  - Provide teaching via preferred learning methods  4/22/2021 2333 by Margarita Pete RN  Outcome: Progressing  4/22/2021 2333 by Margarita Pete RN  Outcome: Progressing     Problem: NEUROSENSORY - ADULT  Goal: Achieves stable or improved neurological status  Description: INTERVENTIONS  - Monitor and report changes in neurological status  - Monitor vital signs such as temperature, blood pressure, glucose, and any other labs ordered   - Initiate measures to prevent increased intracranial pressure  - Monitor for seizure activity and implement precautions if appropriate      4/22/2021 2333 by Margarita Pete RN  Outcome: Progressing  4/22/2021 2333 by Margarita Pete RN  Outcome: Progressing     Problem: GASTROINTESTINAL - ADULT  Goal: Minimal or absence of nausea and/or vomiting  Description: INTERVENTIONS:  - Administer IV fluids if ordered to ensure adequate hydration  - Maintain NPO status until nausea and vomiting are resolved  - Nasogastric tube if ordered  - Administer ordered antiemetic medications as needed  - Provide nonpharmacologic comfort measures as appropriate  - Advance diet as tolerated, if ordered  - Consider nutrition services referral to assist patient with adequate nutrition and appropriate food choices  4/22/2021 2333 by Margarita Pete RN  Outcome: Progressing  4/22/2021 2333 by Margarita Pete RN  Outcome: Progressing     Problem: METABOLIC, FLUID AND ELECTROLYTES - ADULT  Goal: Electrolytes maintained within normal limits  Description: INTERVENTIONS:  - Monitor labs and assess patient for signs and symptoms of electrolyte imbalances  - Administer electrolyte replacement as ordered  - Monitor response to electrolyte replacements, including repeat lab results as appropriate  - Instruct patient on fluid and nutrition as appropriate  4/22/2021 2333 by Mitra Castanon RN  Outcome: Progressing  4/22/2021 2333 by Mitra Castanon RN  Outcome: Progressing     Problem: Prexisting or High Potential for Compromised Skin Integrity  Goal: Skin integrity is maintained or improved  Description: INTERVENTIONS:  - Identify patients at risk for skin breakdown  - Assess and monitor skin integrity  - Assess and monitor nutrition and hydration status  - Monitor labs   - Assess for incontinence   - Turn and reposition patient  - Assist with mobility/ambulation  - Relieve pressure over bony prominences  - Avoid friction and shearing  - Provide appropriate hygiene as needed including keeping skin clean and dry  - Evaluate need for skin moisturizer/barrier cream  - Collaborate with interdisciplinary team   - Patient/family teaching  - Consider wound care consult   4/22/2021 2333 by Mitra Castanon RN  Outcome: Progressing  4/22/2021 2333 by Mitra Castanon RN  Outcome: Progressing     Problem: Potential for Falls  Goal: Patient will remain free of falls  Description: INTERVENTIONS:  - Assess patient frequently for physical needs  -  Identify cognitive and physical deficits and behaviors that affect risk of falls    -  [place] fall precautions as indicated by assessment   - Educate patient/family on patient safety including physical limitations  - Instruct patient to call for assistance with activity based on assessment  - Modify environment to reduce risk of injury  - Consider OT/PT consult to assist with strengthening/mobility  4/22/2021 2333 by Mitra Castanon RN  Outcome: Progressing  4/22/2021 2333 by Mitra Castanon RN  Outcome: Progressing     Problem: Nutrition/Hydration-ADULT  Goal: Nutrient/Hydration intake appropriate for improving, restoring or maintaining nutritional needs  Description: Monitor and assess patient's nutrition/hydration status for malnutrition  Collaborate with interdisciplinary team and initiate plan and interventions as ordered  Monitor patient's weight and dietary intake as ordered or per policy  Utilize nutrition screening tool and intervene as necessary  Determine patient's food preferences and provide high-protein, high-caloric foods as appropriate       INTERVENTIONS:  - Monitor oral intake, urinary output, labs, and treatment plans  - Assess nutrition and hydration status and recommend course of action  - Evaluate amount of meals eaten  - Assist patient with eating if necessary   - Allow adequate time for meals  - Recommend/ encourage appropriate diets, oral nutritional supplements, and vitamin/mineral supplements  - Order, calculate, and assess calorie counts as needed  - Recommend, monitor, and adjust tube feedings and TPN/PPN based on assessed needs  - Assess need for intravenous fluids  - Provide specific nutrition/hydration education as appropriate  - Include patient/family/caregiver in decisions related to nutrition  4/22/2021 2333 by Krystal Wilson RN  Outcome: Progressing  4/22/2021 2333 by Krystal Wilson RN  Outcome: Progressing     Problem: RESPIRATORY - ADULT  Goal: Achieves optimal ventilation and oxygenation  Description: INTERVENTIONS:  - Assess for changes in respiratory status  - Assess for changes in mentation and behavior  - Position to facilitate oxygenation and minimize respiratory effort  - Oxygen administered by appropriate delivery if ordered  - Initiate smoking cessation education as indicated  - Encourage broncho-pulmonary hygiene including cough, deep breathe, Incentive Spirometry  - Assess the need for suctioning and aspirate as needed  - Assess and instruct to report SOB or any respiratory difficulty  - Respiratory Therapy support as indicated  4/22/2021 2333 by Marie Ibanez, RN  Outcome: Progressing  4/22/2021 2333 by Marie Ibanez, RN  Outcome: Progressing

## 2021-04-24 LAB
ALBUMIN SERPL BCP-MCNC: 2.5 G/DL (ref 3.5–5)
ALP SERPL-CCNC: 230 U/L (ref 46–116)
ALT SERPL W P-5'-P-CCNC: 77 U/L (ref 12–78)
ANION GAP SERPL CALCULATED.3IONS-SCNC: 10 MMOL/L (ref 4–13)
AST SERPL W P-5'-P-CCNC: 132 U/L (ref 5–45)
ATRIAL RATE: 103 BPM
ATRIAL RATE: 99 BPM
BASOPHILS # BLD AUTO: 0.04 THOUSANDS/ΜL (ref 0–0.1)
BASOPHILS NFR BLD AUTO: 1 % (ref 0–1)
BILIRUB SERPL-MCNC: 0.69 MG/DL (ref 0.2–1)
BUN SERPL-MCNC: 25 MG/DL (ref 5–25)
CA-I BLD-SCNC: 1.08 MMOL/L (ref 1.12–1.32)
CALCIUM ALBUM COR SERPL-MCNC: 9.7 MG/DL (ref 8.3–10.1)
CALCIUM SERPL-MCNC: 8.5 MG/DL (ref 8.3–10.1)
CHLORIDE SERPL-SCNC: 96 MMOL/L (ref 100–108)
CO2 SERPL-SCNC: 27 MMOL/L (ref 21–32)
CREAT SERPL-MCNC: 1.76 MG/DL (ref 0.6–1.3)
EOSINOPHIL # BLD AUTO: 0.19 THOUSAND/ΜL (ref 0–0.61)
EOSINOPHIL NFR BLD AUTO: 4 % (ref 0–6)
ERYTHROCYTE [DISTWIDTH] IN BLOOD BY AUTOMATED COUNT: 13.6 % (ref 11.6–15.1)
GFR SERPL CREATININE-BSD FRML MDRD: 41 ML/MIN/1.73SQ M
GLUCOSE SERPL-MCNC: 100 MG/DL (ref 65–140)
GLUCOSE SERPL-MCNC: 106 MG/DL (ref 65–140)
GLUCOSE SERPL-MCNC: 131 MG/DL (ref 65–140)
GLUCOSE SERPL-MCNC: 135 MG/DL (ref 65–140)
GLUCOSE SERPL-MCNC: 85 MG/DL (ref 65–140)
HBV SURFACE AG SER QL: NORMAL
HCT VFR BLD AUTO: 28 % (ref 36.5–49.3)
HCV AB SER QL: ABNORMAL
HGB BLD-MCNC: 9.5 G/DL (ref 12–17)
IMM GRANULOCYTES # BLD AUTO: 0.07 THOUSAND/UL (ref 0–0.2)
IMM GRANULOCYTES NFR BLD AUTO: 1 % (ref 0–2)
LYMPHOCYTES # BLD AUTO: 0.79 THOUSANDS/ΜL (ref 0.6–4.47)
LYMPHOCYTES NFR BLD AUTO: 15 % (ref 14–44)
MAGNESIUM SERPL-MCNC: 1.6 MG/DL (ref 1.6–2.6)
MCH RBC QN AUTO: 32.8 PG (ref 26.8–34.3)
MCHC RBC AUTO-ENTMCNC: 33.9 G/DL (ref 31.4–37.4)
MCV RBC AUTO: 97 FL (ref 82–98)
MONOCYTES # BLD AUTO: 1.27 THOUSAND/ΜL (ref 0.17–1.22)
MONOCYTES NFR BLD AUTO: 25 % (ref 4–12)
NEUTROPHILS # BLD AUTO: 2.77 THOUSANDS/ΜL (ref 1.85–7.62)
NEUTS SEG NFR BLD AUTO: 54 % (ref 43–75)
NRBC BLD AUTO-RTO: 0 /100 WBCS
P AXIS: 70 DEGREES
P AXIS: 81 DEGREES
PHOSPHATE SERPL-MCNC: 2.2 MG/DL (ref 2.7–4.5)
PLATELET # BLD AUTO: 46 THOUSANDS/UL (ref 149–390)
PMV BLD AUTO: 12.4 FL (ref 8.9–12.7)
POTASSIUM SERPL-SCNC: 3.8 MMOL/L (ref 3.5–5.3)
PR INTERVAL: 130 MS
PR INTERVAL: 134 MS
PROT SERPL-MCNC: 6.1 G/DL (ref 6.4–8.2)
QRS AXIS: 82 DEGREES
QRS AXIS: 95 DEGREES
QRSD INTERVAL: 82 MS
QRSD INTERVAL: 82 MS
QT INTERVAL: 338 MS
QT INTERVAL: 350 MS
QTC INTERVAL: 442 MS
QTC INTERVAL: 449 MS
RBC # BLD AUTO: 2.9 MILLION/UL (ref 3.88–5.62)
SODIUM SERPL-SCNC: 133 MMOL/L (ref 136–145)
T WAVE AXIS: 69 DEGREES
T WAVE AXIS: 79 DEGREES
VENTRICULAR RATE: 103 BPM
VENTRICULAR RATE: 99 BPM
WBC # BLD AUTO: 5.13 THOUSAND/UL (ref 4.31–10.16)

## 2021-04-24 PROCEDURE — 93010 ELECTROCARDIOGRAM REPORT: CPT | Performed by: INTERNAL MEDICINE

## 2021-04-24 PROCEDURE — 84100 ASSAY OF PHOSPHORUS: CPT | Performed by: STUDENT IN AN ORGANIZED HEALTH CARE EDUCATION/TRAINING PROGRAM

## 2021-04-24 PROCEDURE — 99232 SBSQ HOSP IP/OBS MODERATE 35: CPT | Performed by: FAMILY MEDICINE

## 2021-04-24 PROCEDURE — 82948 REAGENT STRIP/BLOOD GLUCOSE: CPT

## 2021-04-24 PROCEDURE — 82330 ASSAY OF CALCIUM: CPT | Performed by: STUDENT IN AN ORGANIZED HEALTH CARE EDUCATION/TRAINING PROGRAM

## 2021-04-24 PROCEDURE — 85025 COMPLETE CBC W/AUTO DIFF WBC: CPT | Performed by: STUDENT IN AN ORGANIZED HEALTH CARE EDUCATION/TRAINING PROGRAM

## 2021-04-24 PROCEDURE — 80053 COMPREHEN METABOLIC PANEL: CPT | Performed by: STUDENT IN AN ORGANIZED HEALTH CARE EDUCATION/TRAINING PROGRAM

## 2021-04-24 PROCEDURE — 83735 ASSAY OF MAGNESIUM: CPT | Performed by: STUDENT IN AN ORGANIZED HEALTH CARE EDUCATION/TRAINING PROGRAM

## 2021-04-24 RX ORDER — MAGNESIUM SULFATE 1 G/100ML
1 INJECTION INTRAVENOUS ONCE
Status: COMPLETED | OUTPATIENT
Start: 2021-04-24 | End: 2021-04-24

## 2021-04-24 RX ORDER — POTASSIUM CHLORIDE AND SODIUM CHLORIDE 450; 150 MG/100ML; MG/100ML
50 INJECTION, SOLUTION INTRAVENOUS CONTINUOUS
Status: DISCONTINUED | OUTPATIENT
Start: 2021-04-24 | End: 2021-04-25

## 2021-04-24 RX ORDER — POTASSIUM CHLORIDE 20 MEQ/1
40 TABLET, EXTENDED RELEASE ORAL ONCE
Status: DISCONTINUED | OUTPATIENT
Start: 2021-04-24 | End: 2021-04-24

## 2021-04-24 RX ADMIN — FOLIC ACID 1 MG: 1 TABLET ORAL at 08:27

## 2021-04-24 RX ADMIN — MAGNESIUM SULFATE HEPTAHYDRATE 1 G: 1 INJECTION, SOLUTION INTRAVENOUS at 08:32

## 2021-04-24 RX ADMIN — RANOLAZINE 500 MG: 500 TABLET, FILM COATED, EXTENDED RELEASE ORAL at 08:27

## 2021-04-24 RX ADMIN — GABAPENTIN 300 MG: 300 CAPSULE ORAL at 08:27

## 2021-04-24 RX ADMIN — RANOLAZINE 500 MG: 500 TABLET, FILM COATED, EXTENDED RELEASE ORAL at 20:49

## 2021-04-24 RX ADMIN — TAMSULOSIN HYDROCHLORIDE 0.4 MG: 0.4 CAPSULE ORAL at 08:27

## 2021-04-24 RX ADMIN — POTASSIUM CHLORIDE AND SODIUM CHLORIDE 50 ML/HR: 450; 150 INJECTION, SOLUTION INTRAVENOUS at 05:58

## 2021-04-24 RX ADMIN — Medication 125 MG: at 05:56

## 2021-04-24 RX ADMIN — THIAMINE HCL TAB 100 MG 100 MG: 100 TAB at 08:27

## 2021-04-24 RX ADMIN — METOPROLOL TARTRATE 25 MG: 25 TABLET, FILM COATED ORAL at 08:27

## 2021-04-24 RX ADMIN — NICOTINE 21 MG: 21 PATCH, EXTENDED RELEASE TRANSDERMAL at 08:28

## 2021-04-24 RX ADMIN — METOPROLOL TARTRATE 25 MG: 25 TABLET, FILM COATED ORAL at 20:49

## 2021-04-24 RX ADMIN — Medication 125 MG: at 12:41

## 2021-04-24 RX ADMIN — GABAPENTIN 300 MG: 300 CAPSULE ORAL at 20:49

## 2021-04-24 RX ADMIN — GABAPENTIN 300 MG: 300 CAPSULE ORAL at 15:23

## 2021-04-24 RX ADMIN — FLUTICASONE FUROATE AND VILANTEROL TRIFENATATE 1 PUFF: 200; 25 POWDER RESPIRATORY (INHALATION) at 08:43

## 2021-04-24 NOTE — PLAN OF CARE
Problem: PAIN - ADULT  Goal: Verbalizes/displays adequate comfort level or baseline comfort level  Description: Interventions:  - Encourage patient to monitor pain and request assistance  - Assess pain using appropriate pain scale  - Administer analgesics based on type and severity of pain and evaluate response  - Implement non-pharmacological measures as appropriate and evaluate response  - Consider cultural and social influences on pain and pain management  - Notify physician/advanced practitioner if interventions unsuccessful or patient reports new pain  Outcome: Progressing     Problem: SAFETY ADULT  Goal: Patient will remain free of falls  Description: INTERVENTIONS:  - Assess patient frequently for physical needs  -  Identify cognitive and physical deficits and behaviors that affect risk of falls    -  [place] fall precautions as indicated by assessment   - Educate patient/family on patient safety including physical limitations  - Instruct patient to call for assistance with activity based on assessment  - Modify environment to reduce risk of injury  - Consider OT/PT consult to assist with strengthening/mobility  Outcome: Progressing  Goal: Maintain or return to baseline ADL function  Description: INTERVENTIONS:  -  Assess patient's ability to carry out ADLs; assess patient's baseline for ADL function and identify physical deficits which impact ability to perform ADLs (bathing, care of mouth/teeth, toileting, grooming, dressing, etc )  - Assess/evaluate cause of self-care deficits   - Assess range of motion  - Assess patient's mobility; develop plan if impaired  - Assess patient's need for assistive devices and provide as appropriate  - Encourage maximum independence but intervene and supervise when necessary  - Involve family in performance of ADLs  - Assess for home care needs following discharge   - Consider OT consult to assist with ADL evaluation and planning for discharge  - Provide patient education as appropriate  Outcome: Progressing  Goal: Maintain or return mobility status to optimal level  Description: INTERVENTIONS:  - Assess patient's baseline mobility status (ambulation, transfers, stairs, etc )    - Identify cognitive and physical deficits and behaviors that affect mobility  - Identify mobility aids required to assist with transfers and/or ambulation (gait belt, sit-to-stand, lift, walker, cane, etc )  - [place] fall precautions as indicated by assessment  - Record patient progress and toleration of activity level on Mobility SBAR; progress patient to next Phase/Stage  - Instruct patient to call for assistance with activity based on assessment  - Consider rehabilitation consult to assist with strengthening/weightbearing, etc   Outcome: Progressing     Problem: DISCHARGE PLANNING  Goal: Discharge to home or other facility with appropriate resources  Description: INTERVENTIONS:  - Identify barriers to discharge w/patient and caregiver  - Arrange for needed discharge resources and transportation as appropriate  - Identify discharge learning needs (meds, wound care, etc )  - Arrange for interpretive services to assist at discharge as needed  - Refer to Case Management Department for coordinating discharge planning if the patient needs post-hospital services based on physician/advanced practitioner order or complex needs related to functional status, cognitive ability, or social support system  Outcome: Progressing     Problem: Knowledge Deficit  Goal: Patient/family/caregiver demonstrates understanding of disease process, treatment plan, medications, and discharge instructions  Description: Complete learning assessment and assess knowledge base    Interventions:  - Provide teaching at level of understanding  - Provide teaching via preferred learning methods  Outcome: Progressing     Problem: NEUROSENSORY - ADULT  Goal: Achieves stable or improved neurological status  Description: INTERVENTIONS  - Monitor and report changes in neurological status  - Monitor vital signs such as temperature, blood pressure, glucose, and any other labs ordered   - Initiate measures to prevent increased intracranial pressure  - Monitor for seizure activity and implement precautions if appropriate      Outcome: Progressing     Problem: GASTROINTESTINAL - ADULT  Goal: Minimal or absence of nausea and/or vomiting  Description: INTERVENTIONS:  - Administer IV fluids if ordered to ensure adequate hydration  - Maintain NPO status until nausea and vomiting are resolved  - Nasogastric tube if ordered  - Administer ordered antiemetic medications as needed  - Provide nonpharmacologic comfort measures as appropriate  - Advance diet as tolerated, if ordered  - Consider nutrition services referral to assist patient with adequate nutrition and appropriate food choices  Outcome: Progressing     Problem: METABOLIC, FLUID AND ELECTROLYTES - ADULT  Goal: Electrolytes maintained within normal limits  Description: INTERVENTIONS:  - Monitor labs and assess patient for signs and symptoms of electrolyte imbalances  - Administer electrolyte replacement as ordered  - Monitor response to electrolyte replacements, including repeat lab results as appropriate  - Instruct patient on fluid and nutrition as appropriate  Outcome: Progressing     Problem: Prexisting or High Potential for Compromised Skin Integrity  Goal: Skin integrity is maintained or improved  Description: INTERVENTIONS:  - Identify patients at risk for skin breakdown  - Assess and monitor skin integrity  - Assess and monitor nutrition and hydration status  - Monitor labs   - Assess for incontinence   - Turn and reposition patient  - Assist with mobility/ambulation  - Relieve pressure over bony prominences  - Avoid friction and shearing  - Provide appropriate hygiene as needed including keeping skin clean and dry  - Evaluate need for skin moisturizer/barrier cream  - Collaborate with interdisciplinary team   - Patient/family teaching  - Consider wound care consult   Outcome: Progressing     Problem: Potential for Falls  Goal: Patient will remain free of falls  Description: INTERVENTIONS:  - Assess patient frequently for physical needs  -  Identify cognitive and physical deficits and behaviors that affect risk of falls  -  [place] fall precautions as indicated by assessment   - Educate patient/family on patient safety including physical limitations  - Instruct patient to call for assistance with activity based on assessment  - Modify environment to reduce risk of injury  - Consider OT/PT consult to assist with strengthening/mobility  Outcome: Progressing     Problem: Nutrition/Hydration-ADULT  Goal: Nutrient/Hydration intake appropriate for improving, restoring or maintaining nutritional needs  Description: Monitor and assess patient's nutrition/hydration status for malnutrition  Collaborate with interdisciplinary team and initiate plan and interventions as ordered  Monitor patient's weight and dietary intake as ordered or per policy  Utilize nutrition screening tool and intervene as necessary  Determine patient's food preferences and provide high-protein, high-caloric foods as appropriate       INTERVENTIONS:  - Monitor oral intake, urinary output, labs, and treatment plans  - Assess nutrition and hydration status and recommend course of action  - Evaluate amount of meals eaten  - Assist patient with eating if necessary   - Allow adequate time for meals  - Recommend/ encourage appropriate diets, oral nutritional supplements, and vitamin/mineral supplements  - Order, calculate, and assess calorie counts as needed  - Recommend, monitor, and adjust tube feedings and TPN/PPN based on assessed needs  - Assess need for intravenous fluids  - Provide specific nutrition/hydration education as appropriate  - Include patient/family/caregiver in decisions related to nutrition  Outcome: Progressing     Problem: RESPIRATORY - ADULT  Goal: Achieves optimal ventilation and oxygenation  Description: INTERVENTIONS:  - Assess for changes in respiratory status  - Assess for changes in mentation and behavior  - Position to facilitate oxygenation and minimize respiratory effort  - Oxygen administered by appropriate delivery if ordered  - Initiate smoking cessation education as indicated  - Encourage broncho-pulmonary hygiene including cough, deep breathe, Incentive Spirometry  - Assess the need for suctioning and aspirate as needed  - Assess and instruct to report SOB or any respiratory difficulty  - Respiratory Therapy support as indicated  Outcome: Progressing

## 2021-04-24 NOTE — PROGRESS NOTES
[address] [facility] Practice Progress Note - Addie Knight 63 y o  male MRN: [id number]    Unit/Bed#: [address] [place] [address] [id number] Encounter: [id number]      Assessment/Plan:  * Hypovolemic shock [facility])  Assessment & Plan  Patient presented with hypotension to ED systolic blood pressure in the 60s, tachypnea, with lactic acidosis 7 1 and thrombocytopenia of 28  Likely secondary severe dehydration with setting of alcohol use and multiple days of diarrhea with vomiting and poor oral intake  His chest x-ray showed right lower lobe consolidation  COVID-19 negative  CT chest abdomen pelvis showed diffuse severe fatty liver with no obvious source of infection  UA positive for blood and protein, no nitrites or leukocytes  Procalcitonin elevated at 1 32  Systolic blood pressure improved to 90s status post IV fluid boluses but patient developed have attention evening of admission  TLC in line placed an additional 1 L fluid bolus was given along with briefly placed on levo  Currently off any vaso actives  · Blood cultures x2 pending  · Started on empiric cefepime, Flagyl, and vancomycin pending cultures  Alfredo Goody BCx X 2 negative at 24 hrs  DC'd cefepime, flagyl, and IV Vanco  · Stool positive for C diff currently continued on p o  Vancomycin  · Monitor fluid losses    Diarrhea  Assessment & Plan  Patient had concern for 6 days of nausea vomiting and diarrhea on presentation which continued through night of admission and on day of transfer to [facility] surge  Continues to have diarrhea  · CT chest abdomen pelvis showed diffuse fatty liver with no acute findings  · C diff toxin by PCR was positive  P o  Vancomycin added  · Monitor intake and output and consider restarting IV fluids  · Trend BMP daily  · Stool cultures pending  Transaminitis  Assessment & Plan  AST of 171, ALT 80, with alk phos of 176 alcohol use, dehydration and hypovolemic shock on day of admission  Total of 6 5 L of fluid given pulse far at time of transfer    · Avoid hypotension and trend CMP daily    Hypocalcemia  Assessment & Plan  Persistent despite receiving 9 g IV calcium gluconate  Initial at 0 91  4/22 level of 0 99  After receiving 1 g IV calcium chloride increased to 1 12  Hypomagnesemia corrected  Negative for pancreatitis  · Monitor for symptoms of hypocalcemia  · Vitamin-D panel pending  · Replete as needed        Toxic metabolic encephalopathy  Assessment & Plan  Ethanol level of 201 mg/dL on arrival status post phenobarbital loading  At time of transfer patient is alert and oriented to person place and time  · Neuro checks  · Avoid sedating medications  · Delirium precautions, sleep hygiene, fall precautions  · Ammonia level, low less than 10  Increased anion gap metabolic acidosis  Assessment & Plan  Anion gap of 17 on arrival with lactic acidosis  Overnight on day of admission improved 14 and subsequently closed  UA was negative for ketones  Status post 6 5 L IV fluid at time of transfer  · Initial CK elevated at 501 with history of recent falls with his alcohol use  Subsequent CK of 246  · Patient still having diarrhea which is likely contributing to his acidosis  · Mild ENMA on admission of 1 97 from baseline about 1 2 with most recent of 1 76  · Trend BMP daily  Acute on chronic kidney failure [facility])  Assessment & Plan  Lab Results   Component Value Date    EGFR 38 04/22/2021    EGFR 42 04/22/2021    EGFR 43 04/22/2021    CREATININE 1 91 (H) 04/22/2021    CREATININE 1 74 (H) 04/22/2021    CREATININE 1 72 (H) 04/22/2021   Patient's creatinine very variable and elevated on admissions with ENMA on chronic kidney disease but baseline appears to be around 1 2 with possible recent baseline bit higher  · Creatinine on admission 1 97 status post IV contrast and 4 L IV fluids for septic shock in ED  · Hypotension likely contributed ENMA along with dehydration  · Monitor in's and out's and currently on oral diet    Will consider additional fluids if necessary  · Hold home lisinopril  · Trend BMP daily  · Avoid nephrotoxic agents  Currentyl giving 1/2 NS+20K 50ml/hr with Cr 4/24 1 76        History of Atrial fibrillation ([facility] [address] )  Assessment & Plan  At Time of transfer to [facility] sinus tachycardia  Patient is not on anticoagulation outpatient setting due to chronic alcohol use with imbalance and high risk for bleeding  Overnight on day of admission patient was reported by ICU team to have few short runs of atrial fibrillation  · Will continue telemetry  · Currently holding home metoprolol 25 mg b i d  Due to hypotension  Attempt was made to initiate half dose of total 5 mg b i d  This morning but due to hypotension was held off  Will initiate half dose when possible and eventually increased to full 25 mg b i d  To prevent conversion to AFib given his history      Diastolic heart failure [facility])  Assessment & Plan  Wt Readings from Last 3 Encounters:   04/22/21 86 7 kg (191 lb 2 2 oz)   04/12/21 82 7 kg (182 lb 4 8 oz)   03/11/21 88 9 kg (196 lb)     Echo in December 2020 ejection fraction 55-60%, moderate concentric hypertrophy, grade 1 diastolic dysfunction  · Monitor daily weight and caution with replacement        Cervical spinal stenosis  Assessment & Plan  Status post C-spine fixation 12/20 with PT/OT and C-collar use OP  Patient reports falling 2 days ago while intoxicated and has multiple contusions on body including forehead  CT spine cervical without contrast on 4/21 showing:  ---No cervical spine fracture or traumatic malalignment  ---Unchanged anterior and posterior cervical fusion as above  No acute hardware complication  Please note the left T1 pedicle screw traverses the upper margin of the adjacent T1-T2 neural foramen  This was seen previously  Correlate for any radiculopathy in this territory    ---Increasing fluid in the posterior soft tissues along the upper cervical spine laminectomy bed which could be indicative of pseudomeningocele  Limited evaluation due to hardware artifact and absence of IV contrast   ---Moderately advanced multilevel neuroforaminal narrowing which is most pronounced at C3-4 on the right  · Given no focal deficits with follow-up with Neurosurgery outpatient regarding possible pseudomeningocele  Hyponatremia  Assessment & Plan  Likely hypovolemic hypotonic hyponatremia with dehydration secondary to diarrhea and poor oral intake  · Improved to 132 on 4/22  · Trend BMP daily  · Cortisol level within normal limits  · 134 on 4/23, 4/24 133    Alcohol abuse  Assessment & Plan  Drinks about 1 qt of vodka daily  Last drink day prior to admission reported  Alcohol level of 210 on arrival   12/20 admission require C-spine fixation at [facility] and C-collar OP  He has failed multiple attempts at cessation and previously expressed interest in detox center  Status post phenobarbital loading on night of admission  · At time of transfer patient observed to have moderate tremors on exam   · CIWA protocol and will consider additional phenobarb recall with showing further signs of withdrawal  · Consult case management for outpatient rehab/detox  · Fall precautions    Thrombocytopenia ([facility] [address] )  Assessment & Plan  Thrombocytopenia in the past was previously attributed to chronic alcohol use  Unclear etiology this time  · Given current platelets at 23 along with poor balance and alcohol abuse with risk of bleeding will hold anticoagulation at this time  · CBC daily  · Consider transfusion if less than 20 or suspected bleed    Nicotine abuse  Assessment & Plan  1PPD day smoker  · Nicotine patches encouraged smoking cessation    CAD (coronary artery disease)  Assessment & Plan  Coronary disease status post CABG 2004, PCI to LMCA 2014  While recommendation was made initially for Plavix/Eliquis, not started given history of falls, chronic alcohol use and high risk of bleeding    · Continue home statin  · Home metoprolol and ranolazine currently on hold secondary hypotension  Restart once stabilized    HLD (hyperlipidemia)  Assessment & Plan  · Continue home statin    Hypomagnesemia  Assessment & Plan  Magnesium level 0 8 on arrival S/P 6 gIV magnesium  4/22 Mag level of 2 6, 4/23 1 6, 4/24 1 6  · Trend daily  · replete as necessary    Essential (primary) hypertension  Assessment & Plan  Patient presented hypotensive with systolic blood pressure in 60s  Time of transfer to [facility] floor patient status post 6 5 L of fluid  · Currently holding home amlodipine 5 mg q day, lisinopril 10 mg q day, and metoprolol 25 mg b i d  Given hypotension  · Will continue to hold lisinopril given ENMA and restart metoprolol at half dosing 12 5 mg b i d  Metoprolol restarted last night 4/22 at 12 5mg BID and blood pressure subsequently dropped  Will continue to monitor on current dosage with IV fluids and will consider restarting to full dose 25mg BID later 4/23    Type 2 diabetes mellitus [facility])  Assessment & Plan  Lab Results   Component Value Date    HGBA1C 5 3 12/07/2020       Recent Labs     04/21/21  2331 04/22/21  0632 04/22/21  1154 04/22/21  1605   POCGLU 73 168* 159* 134       Blood Sugar Average: Last 72 hrs:  (P) 123 2     Last hemoglobin A1c is 5 3 and patient currently controlled off diabetic medications  ISS algorithm 2 ordered pre meal with blood sugar checks t i d     COPD (chronic obstructive pulmonary disease) ([facility] [address] )  Assessment & Plan  1PPD smoker  No home O2  · Continue on home Breo Ellipta and p r n  Albuterol          Subjective:   Patient seen at bedside  He reports some shortness of breath, abdominal pain, and diarrhea  He does not know if he has had any blood in the stool  Denies chest pain nausea vomiting fever chills  Objective:     Vitals: Blood pressure 155/90, pulse 75, temperature 98 1 °F (36 7 °C), resp  rate 18, height 6' 2" (1 88 m), weight 86 kg (189 lb 9 5 oz), SpO2 94 %  ,Body mass index is 24 34 kg/m²  Wt Readings from Last 3 Encounters:   04/24/21 86 kg (189 lb 9 5 oz)   04/12/21 82 7 kg (182 lb 4 8 oz)   03/11/21 88 9 kg (196 lb)       Intake/Output Summary (Last 24 hours) at 4/24/2021 0183  Last data filed at 4/24/2021 0701  Gross per 24 hour   Intake 1324 17 ml   Output 5875 ml   Net -4550 83 ml       Physical Exam: /90   Pulse 75   Temp 98 1 °F (36 7 °C)   Resp 18   Ht 6' 2" (1 88 m)   Wt 86 kg (189 lb 9 5 oz)   SpO2 94%   BMI 24 34 kg/m²   General appearance: alert and oriented, in no acute distress  Lungs: clear to auscultation bilaterally  Heart: regular rate and rhythm, S1, S2 normal, no murmur, click, rub or gallop  Abdomen: Soft, depressible, bowel sounds present, left lower quadrant tenderness to palpation     Back:  No CVA tenderness      Recent Results (from the past 24 hour(s))   Fingerstick Glucose (POCT)    Collection Time: 04/23/21 12:02 PM   Result Value Ref Range    POC Glucose 87 65 - 140 mg/dl   Fingerstick Glucose (POCT)    Collection Time: 04/23/21  4:46 PM   Result Value Ref Range    POC Glucose 149 (H) 65 - 140 mg/dl   CBC and differential    Collection Time: 04/24/21  5:28 AM   Result Value Ref Range    WBC 5 13 4 31 - 10 16 Thousand/uL    RBC 2 90 (L) 3 88 - 5 62 Million/uL    Hemoglobin 9 5 (L) 12 0 - 17 0 g/dL    Hematocrit 28 0 (L) 36 5 - 49 3 %    MCV 97 82 - 98 fL    MCH 32 8 26 8 - 34 3 pg    MCHC 33 9 31 4 - 37 4 g/dL    RDW 13 6 11 6 - 15 1 %    MPV 12 4 8 9 - 12 7 fL    Platelets 46 (LL) 593 - 390 Thousands/uL    nRBC 0 /100 WBCs    Neutrophils Relative 54 43 - 75 %    Immat GRANS % 1 0 - 2 %    Lymphocytes Relative 15 14 - 44 %    Monocytes Relative 25 (H) 4 - 12 %    Eosinophils Relative 4 0 - 6 %    Basophils Relative 1 0 - 1 %    Neutrophils Absolute 2 77 1 85 - 7 62 Thousands/µL    Immature Grans Absolute 0 07 0 00 - 0 20 Thousand/uL    Lymphocytes Absolute 0 79 0 60 - 4 47 Thousands/µL    Monocytes Absolute 1 27 (H) 0 17 - 1 22 Thousand/µL Eosinophils Absolute 0 19 0 00 - 0 61 Thousand/µL    Basophils Absolute 0 04 0 00 - 0 10 Thousands/µL   Comprehensive metabolic panel    Collection Time: 04/24/21  5:28 AM   Result Value Ref Range    Sodium 133 (L) 136 - 145 mmol/L    Potassium 3 8 3 5 - 5 3 mmol/L    Chloride 96 (L) 100 - 108 mmol/L    CO2 27 21 - 32 mmol/L    ANION GAP 10 4 - 13 mmol/L    BUN 25 5 - 25 mg/dL    Creatinine 1 76 (H) 0 60 - 1 30 mg/dL    Glucose 106 65 - 140 mg/dL    Calcium 8 5 8 3 - 10 1 mg/dL    Corrected Calcium 9 7 8 3 - 10 1 mg/dL     (H) 5 - 45 U/L    ALT 77 12 - 78 U/L    Alkaline Phosphatase 230 (H) 46 - 116 U/L    Total Protein 6 1 (L) 6 4 - 8 2 g/dL    Albumin 2 5 (L) 3 5 - 5 0 g/dL    Total Bilirubin 0 69 0 20 - 1 00 mg/dL    eGFR 41 ml/min/1 73sq m   Phosphorus    Collection Time: 04/24/21  5:28 AM   Result Value Ref Range    Phosphorus 2 2 (L) 2 7 - 4 5 mg/dL   Magnesium    Collection Time: 04/24/21  5:28 AM   Result Value Ref Range    Magnesium 1 6 1 6 - 2 6 mg/dL   Calcium, ionized    Collection Time: 04/24/21  5:28 AM   Result Value Ref Range    Calcium, Ionized 1 08 (L) 1 12 - 1 32 mmol/L   Fingerstick Glucose (POCT)    Collection Time: 04/24/21  7:03 AM   Result Value Ref Range    POC Glucose 100 65 - 140 mg/dl       Current Facility-Administered Medications   Medication Dose Route Frequency Provider Last Rate Last Admin    acetaminophen (TYLENOL) tablet 650 mg  650 mg Oral Q6H PRN Abbe Salaam, CRNP        albuterol (PROVENTIL HFA,VENTOLIN HFA) inhaler 2 puff  2 puff Inhalation Q4H PRN Abbe Salaam, CRNP        fluticasone-vilanterol (BREO ELLIPTA) 200-25 MCG/INH inhaler 1 puff  1 puff Inhalation Daily Abbe Salaam, CRNP   1 puff at 36/61/43 1268    folic acid (FOLVITE) tablet 1 mg  1 mg Oral Daily Abbe Salaam, CRNP   1 mg at 04/23/21 1047    gabapentin (NEURONTIN) capsule 300 mg  300 mg Oral TID Abbe Salaam, CRNP   300 mg at 04/23/21 1078    insulin lispro (HumaLOG) 100 units/mL subcutaneous injection 1-5 Units  1-5 Units Subcutaneous TID AC Gisellepaola Valerio, CRNP   1 Units at 04/22/21 1152    magnesium sulfate IVPB (premix) SOLN 1 g  1 g Intravenous Once Tigre Izquierdo MD        metoprolol tartrate (LOPRESSOR) tablet 25 mg  25 mg Oral Q12H [address] Porterphyashley Garcia MD   25 mg at 04/23/21 2142    nicotine (NICODERM CQ) 21 mg/24 hr TD 24 hr patch 21 mg  21 mg Transdermal Daily Gisellepaola Valerio, CRNP   21 mg at 04/23/21 1045    ondansetron (ZOFRAN) injection 4 mg  4 mg Intravenous Q6H PRN GiselleMARIANNE GuzmanNP        pneumococcal 23-valent polysaccharide vaccine (PNEUMOVAX-23) injection 0 5 mL  0 5 mL Subcutaneous Prior to discharge Giselle MARIANNE ValerioNP        ranolazine (RANEXA) 12 hr tablet 500 mg  500 mg Oral Q12H [address] Gisellepaola Valerio, CRNP   500 mg at 04/23/21 2143    sodium chloride 0 45 % with KCl 20 mEq/L infusion (premix)  50 mL/hr Intravenous Continuous Tigre Izquierdo MD        [facility] capsule 0 4 mg  0 4 mg Oral Daily Giselle Sawyerzoni, CRNP   0 4 mg at 04/23/21 1049    thiamine tablet 100 mg  100 mg Oral Daily Giselle Sawyerzoni, CRNP   100 mg at 04/23/21 1048    vancomycin (VANCOCIN) oral solution 125 mg  125 mg Oral Q6H [address] Giselle Iman, CRNP   125 mg at 04/24/21 0556       Invasive Devices     Peripheral Intravenous Line            Peripheral IV 04/23/21 Left Arm 1 day          Drain            Closed/Suction Drain Posterior;Right Neck Accordion 10 Fr  128 days    External Urinary Catheter Large 1 day                Lab, Imaging and other studies: I have personally reviewed pertinent reports      VTE Pharmacologic Prophylaxis: Reason for no pharmacologic prophylaxis thrombocytopenia  VTE Mechanical Prophylaxis: sequential compression device    Tigre Izquierdo MD

## 2021-04-24 NOTE — PLAN OF CARE
Problem: PAIN - ADULT  Goal: Verbalizes/displays adequate comfort level or baseline comfort level  Description: Interventions:  - Encourage patient to monitor pain and request assistance  - Assess pain using appropriate pain scale  - Administer analgesics based on type and severity of pain and evaluate response  - Implement non-pharmacological measures as appropriate and evaluate response  - Consider cultural and social influences on pain and pain management  - Notify physician/advanced practitioner if interventions unsuccessful or patient reports new pain  Outcome: Progressing     Problem: SAFETY ADULT  Goal: Patient will remain free of falls  Description: INTERVENTIONS:  - Assess patient frequently for physical needs  -  Identify cognitive and physical deficits and behaviors that affect risk of falls    -  [place] fall precautions as indicated by assessment   - Educate patient/family on patient safety including physical limitations  - Instruct patient to call for assistance with activity based on assessment  - Modify environment to reduce risk of injury  - Consider OT/PT consult to assist with strengthening/mobility  Outcome: Progressing  Goal: Maintain or return to baseline ADL function  Description: INTERVENTIONS:  -  Assess patient's ability to carry out ADLs; assess patient's baseline for ADL function and identify physical deficits which impact ability to perform ADLs (bathing, care of mouth/teeth, toileting, grooming, dressing, etc )  - Assess/evaluate cause of self-care deficits   - Assess range of motion  - Assess patient's mobility; develop plan if impaired  - Assess patient's need for assistive devices and provide as appropriate  - Encourage maximum independence but intervene and supervise when necessary  - Involve family in performance of ADLs  - Assess for home care needs following discharge   - Consider OT consult to assist with ADL evaluation and planning for discharge  - Provide patient education as appropriate  Outcome: Progressing  Goal: Maintain or return mobility status to optimal level  Description: INTERVENTIONS:  - Assess patient's baseline mobility status (ambulation, transfers, stairs, etc )    - Identify cognitive and physical deficits and behaviors that affect mobility  - Identify mobility aids required to assist with transfers and/or ambulation (gait belt, sit-to-stand, lift, walker, cane, etc )  - [place] fall precautions as indicated by assessment  - Record patient progress and toleration of activity level on Mobility SBAR; progress patient to next Phase/Stage  - Instruct patient to call for assistance with activity based on assessment  - Consider rehabilitation consult to assist with strengthening/weightbearing, etc   Outcome: Progressing     Problem: DISCHARGE PLANNING  Goal: Discharge to home or other facility with appropriate resources  Description: INTERVENTIONS:  - Identify barriers to discharge w/patient and caregiver  - Arrange for needed discharge resources and transportation as appropriate  - Identify discharge learning needs (meds, wound care, etc )  - Arrange for interpretive services to assist at discharge as needed  - Refer to Case Management Department for coordinating discharge planning if the patient needs post-hospital services based on physician/advanced practitioner order or complex needs related to functional status, cognitive ability, or social support system  Outcome: Progressing     Problem: Knowledge Deficit  Goal: Patient/family/caregiver demonstrates understanding of disease process, treatment plan, medications, and discharge instructions  Description: Complete learning assessment and assess knowledge base    Interventions:  - Provide teaching at level of understanding  - Provide teaching via preferred learning methods  Outcome: Progressing     Problem: NEUROSENSORY - ADULT  Goal: Achieves stable or improved neurological status  Description: INTERVENTIONS  - Monitor and report changes in neurological status  - Monitor vital signs such as temperature, blood pressure, glucose, and any other labs ordered   - Initiate measures to prevent increased intracranial pressure  - Monitor for seizure activity and implement precautions if appropriate      Outcome: Progressing     Problem: GASTROINTESTINAL - ADULT  Goal: Minimal or absence of nausea and/or vomiting  Description: INTERVENTIONS:  - Administer IV fluids if ordered to ensure adequate hydration  - Maintain NPO status until nausea and vomiting are resolved  - Nasogastric tube if ordered  - Administer ordered antiemetic medications as needed  - Provide nonpharmacologic comfort measures as appropriate  - Advance diet as tolerated, if ordered  - Consider nutrition services referral to assist patient with adequate nutrition and appropriate food choices  Outcome: Progressing     Problem: METABOLIC, FLUID AND ELECTROLYTES - ADULT  Goal: Electrolytes maintained within normal limits  Description: INTERVENTIONS:  - Monitor labs and assess patient for signs and symptoms of electrolyte imbalances  - Administer electrolyte replacement as ordered  - Monitor response to electrolyte replacements, including repeat lab results as appropriate  - Instruct patient on fluid and nutrition as appropriate  Outcome: Progressing     Problem: Prexisting or High Potential for Compromised Skin Integrity  Goal: Skin integrity is maintained or improved  Description: INTERVENTIONS:  - Identify patients at risk for skin breakdown  - Assess and monitor skin integrity  - Assess and monitor nutrition and hydration status  - Monitor labs   - Assess for incontinence   - Turn and reposition patient  - Assist with mobility/ambulation  - Relieve pressure over bony prominences  - Avoid friction and shearing  - Provide appropriate hygiene as needed including keeping skin clean and dry  - Evaluate need for skin moisturizer/barrier cream  - Collaborate with interdisciplinary team   - Patient/family teaching  - Consider wound care consult   Outcome: Progressing     Problem: Potential for Falls  Goal: Patient will remain free of falls  Description: INTERVENTIONS:  - Assess patient frequently for physical needs  -  Identify cognitive and physical deficits and behaviors that affect risk of falls  -  [place] fall precautions as indicated by assessment   - Educate patient/family on patient safety including physical limitations  - Instruct patient to call for assistance with activity based on assessment  - Modify environment to reduce risk of injury  - Consider OT/PT consult to assist with strengthening/mobility  Outcome: Progressing     Problem: Nutrition/Hydration-ADULT  Goal: Nutrient/Hydration intake appropriate for improving, restoring or maintaining nutritional needs  Description: Monitor and assess patient's nutrition/hydration status for malnutrition  Collaborate with interdisciplinary team and initiate plan and interventions as ordered  Monitor patient's weight and dietary intake as ordered or per policy  Utilize nutrition screening tool and intervene as necessary  Determine patient's food preferences and provide high-protein, high-caloric foods as appropriate       INTERVENTIONS:  - Monitor oral intake, urinary output, labs, and treatment plans  - Assess nutrition and hydration status and recommend course of action  - Evaluate amount of meals eaten  - Assist patient with eating if necessary   - Allow adequate time for meals  - Recommend/ encourage appropriate diets, oral nutritional supplements, and vitamin/mineral supplements  - Order, calculate, and assess calorie counts as needed  - Recommend, monitor, and adjust tube feedings and TPN/PPN based on assessed needs  - Assess need for intravenous fluids  - Provide specific nutrition/hydration education as appropriate  - Include patient/family/caregiver in decisions related to nutrition  Outcome: Progressing     Problem: RESPIRATORY - ADULT  Goal: Achieves optimal ventilation and oxygenation  Description: INTERVENTIONS:  - Assess for changes in respiratory status  - Assess for changes in mentation and behavior  - Position to facilitate oxygenation and minimize respiratory effort  - Oxygen administered by appropriate delivery if ordered  - Initiate smoking cessation education as indicated  - Encourage broncho-pulmonary hygiene including cough, deep breathe, Incentive Spirometry  - Assess the need for suctioning and aspirate as needed  - Assess and instruct to report SOB or any respiratory difficulty  - Respiratory Therapy support as indicated  Outcome: Progressing

## 2021-04-25 PROBLEM — E87.2 INCREASED ANION GAP METABOLIC ACIDOSIS: Status: RESOLVED | Noted: 2021-04-21 | Resolved: 2021-04-25

## 2021-04-25 PROBLEM — E87.29 INCREASED ANION GAP METABOLIC ACIDOSIS: Status: RESOLVED | Noted: 2021-04-21 | Resolved: 2021-04-25

## 2021-04-25 PROBLEM — R76.8 HEPATITIS C ANTIBODY TEST POSITIVE: Status: ACTIVE | Noted: 2021-04-25

## 2021-04-25 LAB
ALBUMIN SERPL BCP-MCNC: 2.6 G/DL (ref 3.5–5)
ALP SERPL-CCNC: 221 U/L (ref 46–116)
ALT SERPL W P-5'-P-CCNC: 58 U/L (ref 12–78)
ANION GAP SERPL CALCULATED.3IONS-SCNC: 8 MMOL/L (ref 4–13)
AST SERPL W P-5'-P-CCNC: 65 U/L (ref 5–45)
BASO STIPL BLD QL SMEAR: PRESENT
BASOPHILS # BLD AUTO: 0.04 THOUSANDS/ΜL (ref 0–0.1)
BASOPHILS NFR BLD AUTO: 1 % (ref 0–1)
BASOPHILS NFR BLD MANUAL: 1 % (ref 0–1)
BILIRUB SERPL-MCNC: 0.78 MG/DL (ref 0.2–1)
BUN SERPL-MCNC: 20 MG/DL (ref 5–25)
CALCIUM ALBUM COR SERPL-MCNC: 9.3 MG/DL (ref 8.3–10.1)
CALCIUM SERPL-MCNC: 8.2 MG/DL (ref 8.3–10.1)
CHLORIDE SERPL-SCNC: 93 MMOL/L (ref 100–108)
CO2 SERPL-SCNC: 29 MMOL/L (ref 21–32)
CREAT SERPL-MCNC: 1.55 MG/DL (ref 0.6–1.3)
EOSINOPHIL # BLD AUTO: 0.15 THOUSAND/ΜL (ref 0–0.61)
EOSINOPHIL NFR BLD AUTO: 2 % (ref 0–6)
ERYTHROCYTE [DISTWIDTH] IN BLOOD BY AUTOMATED COUNT: 13.8 % (ref 11.6–15.1)
GFR SERPL CREATININE-BSD FRML MDRD: 48 ML/MIN/1.73SQ M
GLUCOSE SERPL-MCNC: 111 MG/DL (ref 65–140)
GLUCOSE SERPL-MCNC: 118 MG/DL (ref 65–140)
GLUCOSE SERPL-MCNC: 94 MG/DL (ref 65–140)
GLUCOSE SERPL-MCNC: 97 MG/DL (ref 65–140)
HCT VFR BLD AUTO: 28.6 % (ref 36.5–49.3)
HGB BLD-MCNC: 9.8 G/DL (ref 12–17)
HIV 1+2 AB+HIV1 P24 AG SERPL QL IA: NORMAL
IMM EOSINOPHIL NFR BLD MANUAL: 2 % (ref 0–6)
IMM GRANULOCYTES # BLD AUTO: 0.12 THOUSAND/UL (ref 0–0.2)
IMM GRANULOCYTES NFR BLD AUTO: 2 % (ref 0–2)
LYMPHOCYTES # BLD AUTO: 0.89 THOUSANDS/ΜL (ref 0.6–4.47)
LYMPHOCYTES NFR BLD AUTO: 15 % (ref 14–44)
LYMPHOCYTES NFR BLD: 15 % (ref 14–44)
MACROCYTES BLD QL AUTO: PRESENT
MAGNESIUM SERPL-MCNC: 1.2 MG/DL (ref 1.6–2.6)
MCH RBC QN AUTO: 33 PG (ref 26.8–34.3)
MCHC RBC AUTO-ENTMCNC: 34.3 G/DL (ref 31.4–37.4)
MCV RBC AUTO: 96 FL (ref 82–98)
MONOCYTES # BLD AUTO: 2.28 THOUSAND/ΜL (ref 0.17–1.22)
MONOCYTES NFR BLD AUTO: 23 % (ref 4–12)
MONOCYTES NFR BLD AUTO: 37 % (ref 4–12)
NEUTROPHILS # BLD AUTO: 2.66 THOUSANDS/ΜL (ref 1.85–7.62)
NEUTS BAND NFR BLD MANUAL: 2 THOUSAND/UL
NEUTS SEG NFR BLD AUTO: 43 % (ref 43–75)
NEUTS SEG NFR BLD AUTO: 57 % (ref 45–77)
NRBC BLD AUTO-RTO: 0 /100 WBCS
PHOSPHATE SERPL-MCNC: 1.8 MG/DL (ref 2.7–4.5)
PLATELET # BLD AUTO: 87 THOUSANDS/UL (ref 149–390)
PLATELET BLD QL SMEAR: ABNORMAL
PMV BLD AUTO: 11.4 FL (ref 8.9–12.7)
POTASSIUM SERPL-SCNC: 3.4 MMOL/L (ref 3.5–5.3)
PROT SERPL-MCNC: 6.3 G/DL (ref 6.4–8.2)
RBC # BLD AUTO: 2.97 MILLION/UL (ref 3.88–5.62)
SODIUM SERPL-SCNC: 130 MMOL/L (ref 136–145)
TOTAL CELLS COUNTED SPEC: 100
WBC # BLD AUTO: 6.14 THOUSAND/UL (ref 4.31–10.16)

## 2021-04-25 PROCEDURE — 99232 SBSQ HOSP IP/OBS MODERATE 35: CPT | Performed by: FAMILY MEDICINE

## 2021-04-25 PROCEDURE — 85007 BL SMEAR W/DIFF WBC COUNT: CPT | Performed by: STUDENT IN AN ORGANIZED HEALTH CARE EDUCATION/TRAINING PROGRAM

## 2021-04-25 PROCEDURE — 97167 OT EVAL HIGH COMPLEX 60 MIN: CPT

## 2021-04-25 PROCEDURE — 83735 ASSAY OF MAGNESIUM: CPT | Performed by: STUDENT IN AN ORGANIZED HEALTH CARE EDUCATION/TRAINING PROGRAM

## 2021-04-25 PROCEDURE — 85025 COMPLETE CBC W/AUTO DIFF WBC: CPT | Performed by: STUDENT IN AN ORGANIZED HEALTH CARE EDUCATION/TRAINING PROGRAM

## 2021-04-25 PROCEDURE — 84100 ASSAY OF PHOSPHORUS: CPT | Performed by: STUDENT IN AN ORGANIZED HEALTH CARE EDUCATION/TRAINING PROGRAM

## 2021-04-25 PROCEDURE — 80053 COMPREHEN METABOLIC PANEL: CPT | Performed by: STUDENT IN AN ORGANIZED HEALTH CARE EDUCATION/TRAINING PROGRAM

## 2021-04-25 PROCEDURE — 87521 HEPATITIS C PROBE&RVRS TRNSC: CPT | Performed by: STUDENT IN AN ORGANIZED HEALTH CARE EDUCATION/TRAINING PROGRAM

## 2021-04-25 PROCEDURE — 82948 REAGENT STRIP/BLOOD GLUCOSE: CPT

## 2021-04-25 RX ORDER — AMLODIPINE BESYLATE 5 MG/1
5 TABLET ORAL DAILY
Status: DISCONTINUED | OUTPATIENT
Start: 2021-04-25 | End: 2021-04-26 | Stop reason: HOSPADM

## 2021-04-25 RX ORDER — POTASSIUM CHLORIDE 14.9 MG/ML
20 INJECTION INTRAVENOUS
Status: COMPLETED | OUTPATIENT
Start: 2021-04-25 | End: 2021-04-25

## 2021-04-25 RX ORDER — MAGNESIUM SULFATE HEPTAHYDRATE 40 MG/ML
4 INJECTION, SOLUTION INTRAVENOUS ONCE
Status: COMPLETED | OUTPATIENT
Start: 2021-04-25 | End: 2021-04-25

## 2021-04-25 RX ORDER — SODIUM CHLORIDE AND POTASSIUM CHLORIDE .9; .15 G/100ML; G/100ML
50 SOLUTION INTRAVENOUS CONTINUOUS
Status: DISCONTINUED | OUTPATIENT
Start: 2021-04-25 | End: 2021-04-26

## 2021-04-25 RX ORDER — MAGNESIUM SULFATE HEPTAHYDRATE 40 MG/ML
2 INJECTION, SOLUTION INTRAVENOUS ONCE
Status: COMPLETED | OUTPATIENT
Start: 2021-04-25 | End: 2021-04-25

## 2021-04-25 RX ORDER — HEPARIN SODIUM 5000 [USP'U]/ML
5000 INJECTION, SOLUTION INTRAVENOUS; SUBCUTANEOUS EVERY 8 HOURS SCHEDULED
Status: DISCONTINUED | OUTPATIENT
Start: 2021-04-25 | End: 2021-04-26 | Stop reason: HOSPADM

## 2021-04-25 RX ADMIN — NICOTINE 21 MG: 21 PATCH, EXTENDED RELEASE TRANSDERMAL at 10:19

## 2021-04-25 RX ADMIN — FOLIC ACID 1 MG: 1 TABLET ORAL at 10:18

## 2021-04-25 RX ADMIN — GABAPENTIN 300 MG: 300 CAPSULE ORAL at 20:47

## 2021-04-25 RX ADMIN — SODIUM CHLORIDE AND POTASSIUM CHLORIDE 50 ML/HR: .9; .15 SOLUTION INTRAVENOUS at 10:49

## 2021-04-25 RX ADMIN — POTASSIUM CHLORIDE 20 MEQ: 14.9 INJECTION, SOLUTION INTRAVENOUS at 13:21

## 2021-04-25 RX ADMIN — FLUTICASONE FUROATE AND VILANTEROL TRIFENATATE 1 PUFF: 200; 25 POWDER RESPIRATORY (INHALATION) at 10:13

## 2021-04-25 RX ADMIN — METOPROLOL TARTRATE 25 MG: 25 TABLET, FILM COATED ORAL at 10:17

## 2021-04-25 RX ADMIN — THIAMINE HCL TAB 100 MG 100 MG: 100 TAB at 10:18

## 2021-04-25 RX ADMIN — METOPROLOL TARTRATE 25 MG: 25 TABLET, FILM COATED ORAL at 20:47

## 2021-04-25 RX ADMIN — POTASSIUM CHLORIDE 20 MEQ: 14.9 INJECTION, SOLUTION INTRAVENOUS at 10:56

## 2021-04-25 RX ADMIN — TAMSULOSIN HYDROCHLORIDE 0.4 MG: 0.4 CAPSULE ORAL at 10:18

## 2021-04-25 RX ADMIN — GABAPENTIN 300 MG: 300 CAPSULE ORAL at 15:37

## 2021-04-25 RX ADMIN — POTASSIUM CHLORIDE 20 MEQ: 14.9 INJECTION, SOLUTION INTRAVENOUS at 15:30

## 2021-04-25 RX ADMIN — POTASSIUM CHLORIDE AND SODIUM CHLORIDE 50 ML/HR: 450; 150 INJECTION, SOLUTION INTRAVENOUS at 02:25

## 2021-04-25 RX ADMIN — RANOLAZINE 500 MG: 500 TABLET, FILM COATED, EXTENDED RELEASE ORAL at 20:47

## 2021-04-25 RX ADMIN — SODIUM PHOSPHATE, MONOBASIC, MONOHYDRATE 21 MMOL: 276; 142 INJECTION, SOLUTION INTRAVENOUS at 11:01

## 2021-04-25 RX ADMIN — HEPARIN SODIUM 5000 UNITS: 5000 INJECTION INTRAVENOUS; SUBCUTANEOUS at 21:04

## 2021-04-25 RX ADMIN — RANOLAZINE 500 MG: 500 TABLET, FILM COATED, EXTENDED RELEASE ORAL at 10:17

## 2021-04-25 RX ADMIN — MAGNESIUM SULFATE HEPTAHYDRATE 4 G: 40 INJECTION, SOLUTION INTRAVENOUS at 11:06

## 2021-04-25 RX ADMIN — GABAPENTIN 300 MG: 300 CAPSULE ORAL at 10:18

## 2021-04-25 RX ADMIN — AMLODIPINE BESYLATE 5 MG: 5 TABLET ORAL at 10:17

## 2021-04-25 RX ADMIN — MAGNESIUM SULFATE HEPTAHYDRATE 2 G: 40 INJECTION, SOLUTION INTRAVENOUS at 15:13

## 2021-04-25 RX ADMIN — HEPARIN SODIUM 5000 UNITS: 5000 INJECTION INTRAVENOUS; SUBCUTANEOUS at 15:33

## 2021-04-25 NOTE — ASSESSMENT & PLAN NOTE
Hepatitis-C antibody test is low reactive    Ordered qualitative PCR test  Yes - the patient is able to be screened

## 2021-04-25 NOTE — PROGRESS NOTES
[facility] Practice Progress Note - Kamlesh Escobar 63 y o  male MRN: [id number]    Unit/Bed#: [address] [place] [address] Encounter: [id number]      Assessment/Plan:  * Hypovolemic shock [facility])  Assessment & Plan  Patient presented with hypotension to ED systolic blood pressure in the 60s, tachypnea, with lactic acidosis 7 1 and thrombocytopenia of 28  Likely secondary severe dehydration with setting of alcohol use and multiple days of diarrhea with vomiting and poor oral intake  His chest x-ray showed right lower lobe consolidation  COVID-19 negative  CT chest abdomen pelvis showed diffuse severe fatty liver with no obvious source of infection  UA positive for blood and protein, no nitrites or leukocytes  Procalcitonin elevated at 1 32  Systolic blood pressure improved to 90s status post IV fluid boluses but patient developed have attention evening of admission  TLC in line placed an additional 1 L fluid bolus was given along with briefly placed on levo  Currently off any vaso actives  · Blood cultures x2 show no growth at 72 hours, await final result  · Patient completed IV antibiotic course  · Stool positive for C diff - completed vancomycin 125 mg 10 dose course  · Monitor fluid status and I&Os  Transaminitis  Assessment & Plan  , ALT 80, with alk phos of 176 alcohol use, dehydration and hypovolemic shock on day of admission  Total of 6 5 L of fluid given pulse far at time of transfer  · Avoid hypotension and trend CMP daily  · AST/ALT/ALP:  171/80/176 > 132/77/230 > 65/58/221  · Hepatitis-C antibody positive for low reactive  Qualitative hepatitis-C PCR pending  · HIV antibody test, peripheral smear pending  Hypocalcemia  Assessment & Plan  Persistent despite receiving 9 g IV calcium gluconate  Initial at 0 91  4/22 level of 0 99  After receiving 1 g IV calcium chloride increased to 1 12  Hypomagnesemia corrected  Negative for pancreatitis    · Monitor for symptoms of hypocalcemia  · Vitamin-D panel pending  · Replete as needed        Toxic metabolic encephalopathy  Assessment & Plan  Ethanol level of 201 mg/dL on arrival status post phenobarbital loading  · Neuro checks  · Avoid sedating medications  · Delirium precautions, sleep hygiene, fall precautions  · Ammonia level, low less than 10  · Alert and oriented x3 at this time  Increased anion gap metabolic acidosis-resolved as of 4/25/2021  Assessment & Plan  Anion gap of 17 on arrival with lactic acidosis  Overnight on day of admission improved 14 and subsequently closed  UA was negative for ketones  Status post 6 5 L IV fluid at time of transfer  · Initial CK elevated at 501 with history of recent falls with his alcohol use  Subsequent CK of 246  · Patient still having diarrhea which is likely contributing to his acidosis  · Mild ENMA on admission of 1 97 from baseline, improving but not resolved  See plan under "acute on chronic kidney failure"    Acute on chronic kidney failure [facility])  Assessment & Plan  Lab Results   Component Value Date    EGFR 48 04/25/2021    EGFR 41 04/24/2021    EGFR 39 04/23/2021    CREATININE 1 55 (H) 04/25/2021    CREATININE 1 76 (H) 04/24/2021    CREATININE 1 84 (H) 04/23/2021   Patient's creatinine very variable and elevated on admissions with ENMA on chronic kidney disease but baseline appears to be around 1 2 with possible recent baseline bit higher  · Creatinine on admission 1 97 status post IV contrast and 4 L IV fluids for septic shock in ED  · Hypotension likely contributed ENMA along with dehydration  · Monitor I&Os and currently on oral diet  Will consider additional fluids if necessary  · Hold home lisinopril  · Trend BMP daily  · Avoid nephrotoxic agents  · Continue IV fluid NS 0 9 % KCl 20 mEq/L at 50cc/hr        History of Atrial fibrillation [facility])  Assessment & Plan  At Time of transfer to [facility] sinus tachycardia    Patient is not on anticoagulation outpatient setting due to chronic alcohol use with imbalance and high risk for bleeding  Overnight on day of admission patient was reported by ICU team to have few short runs of atrial fibrillation  · Will continue telemetry  · Continue Home metoprolol 25 mg b i d  Anticoagulation held at this time due to thrombocytopenia  Diastolic heart failure (HCC)  Assessment & Plan  Wt Readings from Last 3 Encounters:   04/25/21 84 kg (185 lb 3 oz)   04/12/21 82 7 kg (182 lb 4 8 oz)   03/11/21 88 9 kg (196 lb)     Echo in December 2020 ejection fraction 55-60%, moderate concentric hypertrophy, grade 1 diastolic dysfunction  · Monitor daily weight and caution with replacement        Diarrhea  Assessment & Plan  Patient had concern for 6 days of nausea vomiting and diarrhea on presentation which continued through night of admission and on day of transfer to [facility] surge  Continues to have diarrhea  · CT chest abdomen pelvis showed diffuse fatty liver with no acute findings  · C diff toxin by PCR was positive  P o  Vancomycin 125 mg 10 dose course completed  · Patient reports stool is more formed today  Continue to monitor  Cervical spinal stenosis  Assessment & Plan  Status post C-spine fixation 12/20 with PT/OT and C-collar use OP  Patient reports falling 2 days ago while intoxicated and has multiple contusions on body including forehead  CT spine cervical without contrast on 4/21 showing:  ---No cervical spine fracture or traumatic malalignment  ---Unchanged anterior and posterior cervical fusion as above  No acute hardware complication  Please note the left T1 pedicle screw traverses the upper margin of the adjacent T1-T2 neural foramen  This was seen previously  Correlate for any radiculopathy in this territory  ---Increasing fluid in the posterior soft tissues along the upper cervical spine laminectomy bed which could be indicative of pseudomeningocele    Limited evaluation due to hardware artifact and absence of IV contrast   ---Moderately advanced multilevel neuroforaminal narrowing which is most pronounced at C3-4 on the right  · Given no focal deficits with follow-up with Neurosurgery outpatient regarding possible pseudomeningocele  Hyponatremia  Assessment & Plan  Likely hypovolemic hypotonic hyponatremia with dehydration secondary to diarrhea and poor oral intake  · Sodium was improving but is down trending today  · Switch patient from half-normal saline to normal saline IV fluids  · Cortisol level within normal limits    Alcohol abuse  Assessment & Plan  Drinks about 1 qt of vodka daily  Last drink day prior to admission reported  Alcohol level of 210 on arrival   12/20 admission require C-spine fixation at [facility] and C-collar OP  He has failed multiple attempts at cessation and previously expressed interest in detox center  Status post phenobarbital loading on night of admission  · At time of transfer patient observed to have moderate tremors on exam   · CIWA protocol and will consider additional phenobarb recall with showing further signs of withdrawal  · Consult case management for outpatient rehab/detox  · Fall precautions    Thrombocytopenia ([facility] [address] )  Assessment & Plan  Thrombocytopenia in the past was previously attributed to chronic alcohol use  Unclear etiology this time, likely secondary to sepsis  · Given current platelets at 23 along with poor balance and alcohol abuse with risk of bleeding will hold anticoagulation at this time  · Platelets: 28 > 23 > 24 > 46 > 87  · Consider transfusion if less than 20 or suspected bleed    Nicotine abuse  Assessment & Plan  1PPD day smoker  · Nicotine patches encouraged smoking cessation    CAD (coronary artery disease)  Assessment & Plan  Coronary disease status post CABG 2004, PCI to LMCA 2014   While recommendation was made initially for Plavix/Eliquis, not started given history of falls, chronic alcohol use and high risk of bleeding  · Home statin held due to transaminitis  · Continue metoprolol 25 mg q 12h and ranolazine 500 mg q 12h  · Added amlodipine 5 mg daily    Dyslipidemia  Assessment & Plan  Home statin held due to transaminitis    Hypomagnesemia  Assessment & Plan  Magnesium level 0 8 on arrival S/P 6 gIV magnesium  4/22 Mag level of 2 6, 4/23 1 6, 4/24 1 6  · Trend daily  · replete as necessary    Essential hypertension  Assessment & Plan  Patient presented hypotensive with systolic blood pressure in 60s  Time of transfer to [facility] floor patient status post 6 5 L of fluid  · Currently on amlodipine 5 mg daily, metoprolol 25 mg q 12h  · Home lisinopril 10 mg daily still being held due to ENMA  Type 2 diabetes mellitus [facility])  Assessment & Plan  Lab Results   Component Value Date    HGBA1C 5 3 12/07/2020     Last hemoglobin A1c is 5 3 and patient currently controlled off diabetic medications  ISS algorithm 2 ordered pre meal with blood sugar checks t i d  Stable    Hepatitis C antibody test positive  Assessment & Plan  Hepatitis-C antibody test is low reactive  Ordered qualitative PCR test     COPD (chronic obstructive pulmonary disease) ([facility] [address] )  Assessment & Plan  1PPD smoker  No home O2  · Continue on home Breo Ellipta and p r n  Albuterol    Subjective:   Patient seen and examined at bedside  Denies chest pain, dyspnea, abdominal pain, nausea, vomiting  Reports diarrhea but states stool is more formed  Objective:     Vitals: Blood pressure 114/70, pulse 88, temperature 97 8 °F (36 6 °C), temperature source Oral, resp  rate 16, height 6' 2" (1 88 m), weight 84 kg (185 lb 3 oz), SpO2 96 %  ,Body mass index is 23 78 kg/m²    Wt Readings from Last 3 Encounters:   04/25/21 84 kg (185 lb 3 oz)   04/12/21 82 7 kg (182 lb 4 8 oz)   03/11/21 88 9 kg (196 lb)       Intake/Output Summary (Last 24 hours) at 4/25/2021 1355  Last data filed at 4/25/2021 1257  Gross per 24 hour   Intake 2589 ml   Output 3475 ml   Net -886 ml Physical Exam: General appearance: alert and oriented, in no acute distress  Head: Normocephalic, without obvious abnormality, atraumatic  Lungs: clear to auscultation bilaterally  Heart: regular rate and rhythm and S1, S2 normal  Abdomen: soft, non-tender; bowel sounds normal; no masses,  no organomegaly  Extremities: no edema in bilateral lower extremities     Recent Results (from the past 24 hour(s))   Fingerstick Glucose (POCT)    Collection Time: 04/24/21  3:51 PM   Result Value Ref Range    POC Glucose 85 65 - 140 mg/dl   Fingerstick Glucose (POCT)    Collection Time: 04/24/21  7:57 PM   Result Value Ref Range    POC Glucose 135 65 - 140 mg/dl   Peripheral Smear    Collection Time: 04/25/21  5:26 AM   Result Value Ref Range    Segmented Neutrophils Manual 57 45 - 77 %    Bands Manual 2 Thousand/uL    Lymphocytes Manual 15 14 - 44 %    Monocytes Manual 23 (H) 4 - 12 %    Eosinophils Manual 2 0 - 6 %    Basos 1 0 - 1 %    Total Counted 100     RBC Morphology      Macrocytes Present     Basophilic Stippling Present     Platelet Estimate Decreased (A) Adequate   CBC and differential    Collection Time: 04/25/21  5:26 AM   Result Value Ref Range    WBC 6 14 4 31 - 10 16 Thousand/uL    RBC 2 97 (L) 3 88 - 5 62 Million/uL    Hemoglobin 9 8 (L) 12 0 - 17 0 g/dL    Hematocrit 28 6 (L) 36 5 - 49 3 %    MCV 96 82 - 98 fL    MCH 33 0 26 8 - 34 3 pg    MCHC 34 3 31 4 - 37 4 g/dL    RDW 13 8 11 6 - 15 1 %    MPV 11 4 8 9 - 12 7 fL    Platelets 87 (L) 365 - 390 Thousands/uL    nRBC 0 /100 WBCs    Neutrophils Relative 43 43 - 75 %    Immat GRANS % 2 0 - 2 %    Lymphocytes Relative 15 14 - 44 %    Monocytes Relative 37 (H) 4 - 12 %    Eosinophils Relative 2 0 - 6 %    Basophils Relative 1 0 - 1 %    Neutrophils Absolute 2 66 1 85 - 7 62 Thousands/µL    Immature Grans Absolute 0 12 0 00 - 0 20 Thousand/uL    Lymphocytes Absolute 0 89 0 60 - 4 47 Thousands/µL    Monocytes Absolute 2 28 (H) 0 17 - 1 22 Thousand/µL Eosinophils Absolute 0 15 0 00 - 0 61 Thousand/µL    Basophils Absolute 0 04 0 00 - 0 10 Thousands/µL   Comprehensive metabolic panel    Collection Time: 04/25/21  5:26 AM   Result Value Ref Range    Sodium 130 (L) 136 - 145 mmol/L    Potassium 3 4 (L) 3 5 - 5 3 mmol/L    Chloride 93 (L) 100 - 108 mmol/L    CO2 29 21 - 32 mmol/L    ANION GAP 8 4 - 13 mmol/L    BUN 20 5 - 25 mg/dL    Creatinine 1 55 (H) 0 60 - 1 30 mg/dL    Glucose 111 65 - 140 mg/dL    Calcium 8 2 (L) 8 3 - 10 1 mg/dL    Corrected Calcium 9 3 8 3 - 10 1 mg/dL    AST 65 (H) 5 - 45 U/L    ALT 58 12 - 78 U/L    Alkaline Phosphatase 221 (H) 46 - 116 U/L    Total Protein 6 3 (L) 6 4 - 8 2 g/dL    Albumin 2 6 (L) 3 5 - 5 0 g/dL    Total Bilirubin 0 78 0 20 - 1 00 mg/dL    eGFR 48 ml/min/1 73sq m   Phosphorus    Collection Time: 04/25/21  5:26 AM   Result Value Ref Range    Phosphorus 1 8 (L) 2 7 - 4 5 mg/dL   Magnesium    Collection Time: 04/25/21  5:26 AM   Result Value Ref Range    Magnesium 1 2 (L) 1 6 - 2 6 mg/dL   Fingerstick Glucose (POCT)    Collection Time: 04/25/21  7:06 AM   Result Value Ref Range    POC Glucose 94 65 - 140 mg/dl   Fingerstick Glucose (POCT)    Collection Time: 04/25/21 11:57 AM   Result Value Ref Range    POC Glucose 97 65 - 140 mg/dl       Current Facility-Administered Medications   Medication Dose Route Frequency Provider Last Rate Last Admin    acetaminophen (TYLENOL) tablet 650 mg  650 mg Oral Q6H PRN CHELSEA Barlow        albuterol (PROVENTIL HFA,VENTOLIN HFA) inhaler 2 puff  2 puff Inhalation Q4H PRN CHELSEA Barlow        amLODIPine (NORVASC) tablet 5 mg  5 mg Oral Daily Melissa Rose, DO   5 mg at 04/25/21 1017    fluticasone-vilanterol (BREO ELLIPTA) 200-25 MCG/INH inhaler 1 puff  1 puff Inhalation Daily CHELSEA Barlow   1 puff at 57/56/56 0687    folic acid (FOLVITE) tablet 1 mg  1 mg Oral Daily CHELSEA Barolw   1 mg at 04/25/21 1018    gabapentin (NEURONTIN) capsule 300 mg  300 mg Oral TID CHELSEA Mohr   300 mg at 04/25/21 1018    insulin lispro (HumaLOG) 100 units/mL subcutaneous injection 1-5 Units  1-5 Units Subcutaneous TID AC CHELSEA Mohr   1 Units at 04/22/21 1152    magnesium sulfate 4 g/100 mL IVPB (premix) 4 g  4 g Intravenous Once Glenard Adriano, DO   4 g at 04/25/21 1106    Followed by   Chante Charles magnesium sulfate 2 g/50 mL IVPB (premix) 2 g  2 g Intravenous Once Glenard Adriano, DO        metoprolol tartrate (LOPRESSOR) tablet 25 mg  25 mg Oral Q12H [facility] Clifton Garcia MD   25 mg at 04/25/21 1017    nicotine (NICODERM CQ) 21 mg/24 hr TD 24 hr patch 21 mg  21 mg Transdermal Daily MARIANNE MohrNP   21 mg at 04/25/21 1019    ondansetron (ZOFRAN) injection 4 mg  4 mg Intravenous Q6H PRN CHELSEA Mohr        pneumococcal 23-valent polysaccharide vaccine (PNEUMOVAX-23) injection 0 5 mL  0 5 mL Subcutaneous Prior to discharge CHELSEA Mohr        potassium chloride 20 mEq IVPB (premix)  20 mEq Intravenous Q2H Melissa Milton, DO 0 mL/hr at 04/25/21 1320 20 mEq at 04/25/21 1321    ranolazine (RANEXA) 12 hr tablet 500 mg  500 mg Oral Q12H [facility] CHELSEA Mohr   500 mg at 04/25/21 1017    sodium chloride 0 9 % with KCl 20 mEq/L infusion (premix)  50 mL/hr Intravenous Continuous Melissa Rose, DO 50 mL/hr at 04/25/21 1049 50 mL/hr at 04/25/21 1049    sodium phosphate 21 mmol in dextrose 5 % 250 mL Infusion  21 mmol Intravenous Once Glenard Adriano, DO 62 5 mL/hr at 04/25/21 1101 21 mmol at 04/25/21 1101    tamsulosin (FLOMAX) capsule 0 4 mg  0 4 mg Oral Daily MARIANNE MohrNP   0 4 mg at 04/25/21 1018    thiamine tablet 100 mg  100 mg Oral Daily CHELSEA Mohr   100 mg at 04/25/21 1018       Invasive Devices     Peripheral Intravenous Line            Peripheral IV 04/23/21 Left Arm 2 days          Drain            Closed/Suction Drain Posterior;Right Neck Accordion 10 Fr  129 days                Lab, Imaging and other studies: I have personally reviewed pertinent reports      VTE Pharmacologic Prophylaxis: Reason for no pharmacologic prophylaxis thrombocytopenia  VTE Mechanical Prophylaxis: sequential compression device    Deena Strong DO

## 2021-04-25 NOTE — OCCUPATIONAL THERAPY NOTE
OT EVALUATION     04/25/21 1200   OT Last Visit   OT Visit Date 04/25/21   Note Type   Note type Evaluation   Restrictions/Precautions   Other Precautions Contact/isolation; Fall Risk;O2;Multiple lines; Chair Alarm; Bed Alarm   Pain Assessment   Pain Assessment Tool 0-10   Pain Score No Pain   Home Living   Type of Home Apartment   Home Layout One level;Elevator   Bathroom Shower/Tub Tub/shower unit   Home Equipment Walker   Prior Function   Level of [place] Independent with ADLs and functional mobility; Needs assistance with IADLs   Lives With Alone   Receives Help From Friend(s)   ADL Assistance Independent   IADLs Needs assistance   Falls in the last 6 months 1 to 4   Subjective   Subjective "I want to go home first and then go to rehab "   ADL   Where Assessed Chair   Eating Assistance 5  Supervision/Setup   Grooming Assistance 4  Minimal Assistance   UB Dressing Assistance 4  Minimal Assistance   LB Dressing Assistance 3  Moderate Assistance   [address]  5  Supervision/Setup   Toileting Deficit Use of bedpan/urinal setup   Bed Mobility   Rolling R 5  Supervision   Rolling L 5  Supervision   Supine to Sit 4  Minimal assistance   Sit to Supine 4  Minimal assistance   Transfers   Sit to Stand 3  Moderate assistance   Stand to Sit 3  Moderate assistance   Stand pivot 3  Moderate assistance   Toilet transfer 3  Moderate assistance   Functional Mobility   Functional Mobility 4  Minimal assistance   Additional Comments several steps at bedside   Additional items Rolling walker   Balance   Static Sitting Fair   Dynamic Sitting Fair   Static Standing Fair   Dynamic Standing Fair -   Activity Tolerance   Activity Tolerance Patient limited by fatigue   RUE Assessment   RUE Assessment WFL   LUE Assessment   LUE Assessment WFL   Cognition   Overall Cognitive Status WFL   Arousal/Participation Alert; Cooperative   Attention Attends with cues to redirect   Orientation Level Oriented X4   Following Commands Follows one step commands without difficulty   Assessment   Limitation Decreased ADL status; Decreased high-level ADLs; Decreased self-care trans;Decreased endurance   Prognosis Good   Assessment Patient evaluated by Occupational Therapy  Patient admitted with Hypovolemic shock ([facility] [address] )  The patients occupational profile, medical and therapy history includes a expanded review of medical and/or therapy records and additional review of physical, cognitive, or psychosocial history related to current functional performance  Comorbidities affecting functional mobility and ADLS include: Afib, alcohol abuse, CABG, CAD, COPD, diabetes, falls, hypertension, hyperlipidemia and spinal stenosis  Prior to admission, patient was independent with functional mobility with walker, independent with ADLS, requiring assist for IADLS, living alone in single story home with no steps to enter and ambulating household distance  The evaluation identifies the following performance deficits: weakness, impaired balance, decreased endurance, increased fall risk, decreased ADLS, decreased IADLS, decreased activity tolerance and decreased strength, that result in activity limitations and/or participation restrictions  This evaluation requires clinical decision making of high complexity, because the patient presents with comorbidites that affect occupational performance and required significant modification of tasks or assistance with consideration of multiple treatment options  The Barthel Index was used as a functional outcome tool presenting with a score of 40, indicating marked limitations of functional mobility and ADLS  The patient's raw score on the -PAC Daily Activity inpatient short form is 16, standardized score is 35 96, less than 39 4  Patients at this level are likely to benefit from DC to post-acute rehabilitation services  Please refer to the recommendation of the Occupational Therapist for safe DC planning   Patient will benefit from skilled Occupational Therapy services to address above deficits and facilitate a safe return to prior level of function  Goals   Patient Goals "go home and then go to rehab "   STG Time Frame   (1-7 days)   Short Term Goal #1 Goals established to promote patient goal of "go home and then go to rehab":  Patient will increase standing tolerance to 5 minutes during ADL task to decrease assistance level and decrease fall risk; Patient will increase bed mobility to independent in preparation for ADLS and transfers; Patient will increase functional mobility to and from bathroom with rolling walker with min assist to increase performance with ADLS and to use a toilet; Patient will tolerate 8 minutes of UE ROM/strengthening to increase general activity tolerance and performance in ADLS/IADLS; Patient will improve functional activity tolerance to 12 minutes of sustained functional tasks to increase participation in basic self-care and decrease assistance level;    LTG Time Frame   (8-14 days)   Long Term Goal #1 Patient will increase standing tolerance to 10 minutes during ADL task to decrease assistance level and decrease fall risk; Patient will increase bed mobility to independent in preparation for ADLS and transfers; Patient will increase functional mobility to and from bathroom with rolling walker with supervision to increase performance with ADLS and to use a toilet; Patient will tolerate 12 minutes of UE ROM/strengthening to increase general activity tolerance and performance in ADLS/IADLS; Patient will improve functional activity tolerance to 20 minutes of sustained functional tasks to increase participation in basic self-care and decrease assistance level;    Functional Transfer Goals   Pt Will Perform All Functional Transfers   (STG: MIN assist LTG: supervision)   ADL Goals   Pt Will Perform Grooming   (STG: supervision LTG: independent)   Pt Will Perform UE Dressing   (STG: supervision LTG: independent)   Pt Will Perform LE Dressing   (STG: MIN assist LTG: supervision)   Pt Will Perform Toileting   (STG: Supervision LTG: independent)   Plan   Treatment Interventions ADL retraining;Functional transfer training;UE strengthening/ROM; Endurance training;Patient/family training;Equipment evaluation/education; Compensatory technique education; Energy conservation; Activityengagement   Goal Expiration Date 05/09/21   OT Frequency 3-5x/wk   Recommendation   OT Discharge Recommendation Post acute rehabilitation services   Equipment Recommended Shower/Tub chair with back ($)   AM-PAC Daily Activity Inpatient   Lower Body Dressing 2   Bathing 2   Toileting 3   Upper Body Dressing 3   Grooming 3   Eating 3   Daily Activity Raw Score 16   Daily Activity Standardized Score (Calc for Raw Score >=11) 35 96   AM-PAC Applied Cognition Inpatient   Following a Speech/Presentation 4   Understanding Ordinary Conversation 4   Taking Medications 4   Remembering Where Things Are Placed or Put Away 4   Remembering List of 4-5 Errands 4   Taking Care of Complicated Tasks 4   Applied Cognition Raw Score 24   Applied Cognition Standardized Score 62 21   Barthel Index   Feeding 10   Bathing 0   Grooming Score 0   Dressing Score 5   Bladder Score 5   Bowels Score 5   Toilet Use Score 5   Transfers (Bed/Chair) Score 10   Mobility (Level Surface) Score 0   Stairs Score 0   Barthel Index Score 36   Licensure   [place] License Number  [id number] [facility] [place] License # [id number]

## 2021-04-25 NOTE — CASE MANAGEMENT
LOS - 4 days    SW following to assist with DCP  Message received from Dr Bao Kline regarding potential discharge and need for STR placement  SW placed call to pt to discuss plans and review recommendations  Discussed STR placement with pt  Pt declined rehab stating he needs to return home to clean his apartment of the diarrhea he had prior to admission and to complete paperwork to renew his lease  However when SW discussed discharge home pt said he cannot go yet because he can't walk  SW attempted to explain that rehab will assist with that and that is why it is being recommended  Pt still reluctant to accept rehab  Per pt he has been working with someone at the [facility] office on getting into alcohol detox  SW discussed above with Dr Bao Kline said team will follow up with pt to discuss further  SW will continue to follow to monitor progress and assist with planning as needed

## 2021-04-26 VITALS
RESPIRATION RATE: 18 BRPM | TEMPERATURE: 97.8 F | SYSTOLIC BLOOD PRESSURE: 157 MMHG | WEIGHT: 185.19 LBS | DIASTOLIC BLOOD PRESSURE: 96 MMHG | HEART RATE: 100 BPM | OXYGEN SATURATION: 98 % | BODY MASS INDEX: 23.77 KG/M2 | HEIGHT: 74 IN

## 2021-04-26 PROBLEM — G92.8 TOXIC METABOLIC ENCEPHALOPATHY: Status: RESOLVED | Noted: 2021-04-21 | Resolved: 2021-04-26

## 2021-04-26 PROBLEM — E83.42 HYPOMAGNESEMIA: Status: RESOLVED | Noted: 2018-10-10 | Resolved: 2021-04-26

## 2021-04-26 PROBLEM — R53.81 PHYSICAL DECONDITIONING: Status: ACTIVE | Noted: 2021-04-26

## 2021-04-26 PROBLEM — R57.1 HYPOVOLEMIC SHOCK (HCC): Status: RESOLVED | Noted: 2019-12-22 | Resolved: 2021-04-26

## 2021-04-26 LAB
ALBUMIN SERPL BCP-MCNC: 2.6 G/DL (ref 3.5–5)
ALP SERPL-CCNC: 196 U/L (ref 46–116)
ALT SERPL W P-5'-P-CCNC: 52 U/L (ref 12–78)
ANION GAP SERPL CALCULATED.3IONS-SCNC: 8 MMOL/L (ref 4–13)
ANISOCYTOSIS BLD QL SMEAR: PRESENT
AST SERPL W P-5'-P-CCNC: 37 U/L (ref 5–45)
BACTERIA BLD CULT: NORMAL
BACTERIA BLD CULT: NORMAL
BASOPHILS # BLD MANUAL: 0 THOUSAND/UL (ref 0–0.1)
BASOPHILS NFR MAR MANUAL: 0 % (ref 0–1)
BILIRUB SERPL-MCNC: 0.57 MG/DL (ref 0.2–1)
BUN SERPL-MCNC: 17 MG/DL (ref 5–25)
CALCIUM ALBUM COR SERPL-MCNC: 9.4 MG/DL (ref 8.3–10.1)
CALCIUM SERPL-MCNC: 8.3 MG/DL (ref 8.3–10.1)
CHLORIDE SERPL-SCNC: 97 MMOL/L (ref 100–108)
CO2 SERPL-SCNC: 29 MMOL/L (ref 21–32)
CREAT SERPL-MCNC: 1.45 MG/DL (ref 0.6–1.3)
EOSINOPHIL # BLD MANUAL: 0.18 THOUSAND/UL (ref 0–0.4)
EOSINOPHIL NFR BLD MANUAL: 3 % (ref 0–6)
ERYTHROCYTE [DISTWIDTH] IN BLOOD BY AUTOMATED COUNT: 14 % (ref 11.6–15.1)
GFR SERPL CREATININE-BSD FRML MDRD: 52 ML/MIN/1.73SQ M
GLUCOSE SERPL-MCNC: 100 MG/DL (ref 65–140)
GLUCOSE SERPL-MCNC: 107 MG/DL (ref 65–140)
GLUCOSE SERPL-MCNC: 143 MG/DL (ref 65–140)
HCT VFR BLD AUTO: 29.3 % (ref 36.5–49.3)
HGB BLD-MCNC: 9.7 G/DL (ref 12–17)
HYPERCHROMIA BLD QL SMEAR: PRESENT
LYMPHOCYTES # BLD AUTO: 0.95 THOUSAND/UL (ref 0.6–4.47)
LYMPHOCYTES # BLD AUTO: 16 % (ref 14–44)
MAGNESIUM SERPL-MCNC: 1.9 MG/DL (ref 1.6–2.6)
MCH RBC QN AUTO: 32.4 PG (ref 26.8–34.3)
MCHC RBC AUTO-ENTMCNC: 33.1 G/DL (ref 31.4–37.4)
MCV RBC AUTO: 98 FL (ref 82–98)
MONOCYTES # BLD AUTO: 2.01 THOUSAND/UL (ref 0–1.22)
MONOCYTES NFR BLD: 34 % (ref 4–12)
NEUTROPHILS # BLD MANUAL: 2.72 THOUSAND/UL (ref 1.85–7.62)
NEUTS SEG NFR BLD AUTO: 46 % (ref 43–75)
NRBC BLD AUTO-RTO: 0 /100 WBCS
PHOSPHATE SERPL-MCNC: 2.9 MG/DL (ref 2.7–4.5)
PLATELET # BLD AUTO: 133 THOUSANDS/UL (ref 149–390)
PLATELET BLD QL SMEAR: ABNORMAL
PMV BLD AUTO: 10.9 FL (ref 8.9–12.7)
POTASSIUM SERPL-SCNC: 4 MMOL/L (ref 3.5–5.3)
PROT SERPL-MCNC: 6.5 G/DL (ref 6.4–8.2)
RBC # BLD AUTO: 2.99 MILLION/UL (ref 3.88–5.62)
RBC MORPH BLD: PRESENT
SODIUM SERPL-SCNC: 134 MMOL/L (ref 136–145)
TOTAL CELLS COUNTED SPEC: 100
VARIANT LYMPHS # BLD AUTO: 1 %
WBC # BLD AUTO: 5.91 THOUSAND/UL (ref 4.31–10.16)

## 2021-04-26 PROCEDURE — 85027 COMPLETE CBC AUTOMATED: CPT | Performed by: STUDENT IN AN ORGANIZED HEALTH CARE EDUCATION/TRAINING PROGRAM

## 2021-04-26 PROCEDURE — 85007 BL SMEAR W/DIFF WBC COUNT: CPT | Performed by: STUDENT IN AN ORGANIZED HEALTH CARE EDUCATION/TRAINING PROGRAM

## 2021-04-26 PROCEDURE — 84100 ASSAY OF PHOSPHORUS: CPT | Performed by: STUDENT IN AN ORGANIZED HEALTH CARE EDUCATION/TRAINING PROGRAM

## 2021-04-26 PROCEDURE — G0009 ADMIN PNEUMOCOCCAL VACCINE: HCPCS | Performed by: NURSE PRACTITIONER

## 2021-04-26 PROCEDURE — 99238 HOSP IP/OBS DSCHRG MGMT 30/<: CPT | Performed by: FAMILY MEDICINE

## 2021-04-26 PROCEDURE — NC001 PR NO CHARGE: Performed by: FAMILY MEDICINE

## 2021-04-26 PROCEDURE — 97530 THERAPEUTIC ACTIVITIES: CPT

## 2021-04-26 PROCEDURE — 80053 COMPREHEN METABOLIC PANEL: CPT | Performed by: STUDENT IN AN ORGANIZED HEALTH CARE EDUCATION/TRAINING PROGRAM

## 2021-04-26 PROCEDURE — 83735 ASSAY OF MAGNESIUM: CPT | Performed by: STUDENT IN AN ORGANIZED HEALTH CARE EDUCATION/TRAINING PROGRAM

## 2021-04-26 PROCEDURE — 90732 PPSV23 VACC 2 YRS+ SUBQ/IM: CPT | Performed by: NURSE PRACTITIONER

## 2021-04-26 PROCEDURE — 82948 REAGENT STRIP/BLOOD GLUCOSE: CPT

## 2021-04-26 RX ORDER — LISINOPRIL 10 MG/1
10 TABLET ORAL DAILY
Qty: 30 TABLET | Refills: 2 | Status: SHIPPED | OUTPATIENT
Start: 2021-05-03 | End: 2021-06-23

## 2021-04-26 RX ADMIN — ACETAMINOPHEN 650 MG: 325 TABLET, FILM COATED ORAL at 10:20

## 2021-04-26 RX ADMIN — SODIUM CHLORIDE AND POTASSIUM CHLORIDE 50 ML/HR: .9; .15 SOLUTION INTRAVENOUS at 04:36

## 2021-04-26 RX ADMIN — NICOTINE 21 MG: 21 PATCH, EXTENDED RELEASE TRANSDERMAL at 10:23

## 2021-04-26 RX ADMIN — TAMSULOSIN HYDROCHLORIDE 0.4 MG: 0.4 CAPSULE ORAL at 10:21

## 2021-04-26 RX ADMIN — HEPARIN SODIUM 5000 UNITS: 5000 INJECTION INTRAVENOUS; SUBCUTANEOUS at 06:26

## 2021-04-26 RX ADMIN — FOLIC ACID 1 MG: 1 TABLET ORAL at 10:21

## 2021-04-26 RX ADMIN — FLUTICASONE FUROATE AND VILANTEROL TRIFENATATE 1 PUFF: 200; 25 POWDER RESPIRATORY (INHALATION) at 10:22

## 2021-04-26 RX ADMIN — METOPROLOL TARTRATE 25 MG: 25 TABLET, FILM COATED ORAL at 10:23

## 2021-04-26 RX ADMIN — HEPARIN SODIUM 5000 UNITS: 5000 INJECTION INTRAVENOUS; SUBCUTANEOUS at 15:23

## 2021-04-26 RX ADMIN — AMLODIPINE BESYLATE 5 MG: 5 TABLET ORAL at 10:22

## 2021-04-26 RX ADMIN — GABAPENTIN 300 MG: 300 CAPSULE ORAL at 10:22

## 2021-04-26 RX ADMIN — GABAPENTIN 300 MG: 300 CAPSULE ORAL at 16:20

## 2021-04-26 RX ADMIN — PNEUMOCOCCAL VACCINE POLYVALENT 0.5 ML
25; 25; 25; 25; 25; 25; 25; 25; 25; 25; 25; 25; 25; 25; 25; 25; 25; 25; 25; 25; 25; 25; 25 INJECTION, SOLUTION INTRAMUSCULAR; SUBCUTANEOUS at 16:22

## 2021-04-26 RX ADMIN — ACETAMINOPHEN 650 MG: 325 TABLET, FILM COATED ORAL at 16:42

## 2021-04-26 RX ADMIN — THIAMINE HCL TAB 100 MG 100 MG: 100 TAB at 10:21

## 2021-04-26 RX ADMIN — RANOLAZINE 500 MG: 500 TABLET, FILM COATED, EXTENDED RELEASE ORAL at 10:21

## 2021-04-26 NOTE — CASE MANAGEMENT
Discharge ordered  Patient will be returning home today and is adamantly declining STR despite encouragement  Patient wishes to return home with outpatient PT      [facility] arranged transport at [address] with [facility]  Nurse made aware

## 2021-04-26 NOTE — DISCHARGE SUMMARY
[facility] Discharge Summary - Medical Laverne Christine 63 y o  male MRN: [id number]    [address] [address] Room / Bed: [address] Anthony Encounter: [id number]    BRIEF OVERVIEW  Admitting Provider: Joslyn Crisostomo MD  Discharge Provider: Kei Frank MD    Discharge To: Home  Facility: Decline short term rehab    Outpatient Follow-Up: Alisha Gunn    Things to address at first follow up visit:   · Assess alcohol use and if patient is requiring any support or resources  Involve case management during this visit if appropriate  · Patient was advised to hold lisinopril for 1 week due to ENMA during hospital course  Has patient restarted lisinopril? Patient should have BMP done 1 week after restarting lisinopril to reassess renal function  Order was placed on discharge  · Is patient established with outpatient physical therapy for physical deconditioning? · Remind patient of upcoming neurosurgery appointment on 6/17 and to follow-up regarding incidental finding of "pseudomeningocele" during that visit      Labs and results pending at discharge: Hepatitis C Qualitative PCR 4/25, vitamin D panel 4/22, Blood cultures x2 final result 4/21    Admission Date: 4/21/2021     Discharge Date: 04/26/21    Primary Discharge Diagnosis  Principal Problem:    Hypovolemic shock ([facility])  Active Problems:    Transaminitis    Hypocalcemia    Acute on chronic kidney failure (HCC)    History of Atrial fibrillation (HCC)    Diastolic heart failure (HCC)    Diarrhea    Cervical spinal stenosis    Hyponatremia    Alcohol abuse    Nicotine abuse    Thrombocytopenia (HCC)    CAD (coronary artery disease)    Dyslipidemia    Essential hypertension    Type 2 diabetes mellitus (HCC)    COPD (chronic obstructive pulmonary disease) (HCC)    Hepatitis C antibody test positive    Physical deconditioning  Resolved Problems:    Toxic metabolic encephalopathy Increased anion gap metabolic acidosis    Hypomagnesemia    Lactic acidosis    DETAILS OF HOSPITAL STAY    Procedures Performed/Pertinent Test results  4/26: Hgb 9 7, Plt 133, Na 134, K 4, Cr 1 45, AST/ALT/ALP 37/52/196  4/25:  HGB 9 8, PLT 87, Na 130, K 3 4, Cr 1 55, mg 1 2, phos 1 8, AST/ALT/ ALP 65/58/221  Peripheral smear: elevated monocytes, reduced PLTs  4/24:  PLT 46 K 3 8 Na 133 CR 1 76  ALT 77  phosphorus 2 2 Mg 1 6, HIV (-), Hep C Ab LOW REACTIVE, Hep B Sag (-)  RUQ U/S: Enlarged fatty liver  Thickened gallbladder wall of indeterminate clinical significance  No stones  Negative Peres's sign  4/23: K 3 6, Plt 24, Na 134, MRSA Negative, HGB 9 6  4/22:  Na 133-> 130, CR 1 72-> 1 91, CK WNL, Plt 23  4/21:  C diff (+), stool enteric panel (-), Procal 1 32, CR 1 60 WBC 8 64, HGB 10 3, PLT 28, lactate 7 1->2 9->1 8, Mg 0 8->1 4, Phos 2 2, EtOH 210, , CKMB 5 1 (normal <5 0),   UA:  Protein, blood  CT head:  NAD  CT cervical:  Left T1 pedicle screw transverse adjacent to T1-T2 neural foramen correlate clinically for radiculopathy, possible meningocele  CT chest abdomen pelvis:  Severe fatty liver, aortic atherosclerosis, sigmoid diverticulosis  XR knee B/L:  NAD  CXR:  Right subsegmental atelectasis, right central venous catheter    HPI (copied from H&P)  HX and PE limited by: Mental status/ETOH   Hari Collier is a 63 y o  male with PMHx of chronic alcohol abuse, frequent falls s/p C-spine fixation 12/2020, COPD,  CAD s/p CABG 2004 and PCI to  2014, T2DM, HLD, HTN, and Afib who presents with 4-6 day history of nausea, vomiting and diarrhea  He reports inability to tolerate anything by mouth, but had continued to consume alcohol during this time  Also reports falling from a stand 2 days ago though he did not seek medical attention at that time  [place]akes asleep on the sidewalk today while waiting for a cab to bring him to the hospital    Bystanders called EMS and he was brought to the ED   On arrival he was hypotensive to SBP of 60's, tachypneic, LA 7 1, AG 17, plt's 28  Notable labs also include ETOH 201, creatinine 1 97, and mag 0 8  Met sepsis criteria, BCx2, UA sent  Was provided with 4 liters IVF's due to persistent hypotension  Started on empiric coverage with Cefepime, flagyl and vanco  SBP increased after 4th liter and was accepted to step down ICU for monitoring  History obtained from chart review and the patient  Patient is a poor [facility]  Hospital Course  On arrival he was hypotensive with systolic blood pressure in the 60s, tachypneic, had an elevated anion gap metabolic acidosis with lactic acid of 7 1, and anion gap of 17 and thrombocytopenia with platelets of 28  He met sepsis criteria and sepsis protocol was initiated  Receive 4 L of IV fluids due to persistent hypotension and started on empiric antibiotics of cefepime, Flagyl, and vancomycin IV  He required a short course of vasopressors for further stabilization  Systolic blood pressure increased after 4 L IV fluids and short course of vasopressors after which he was moved to step-down category for monitoring  Patient remained stable without vasopressors with mild IV fluid resuscitation and hypovolemic shock resolved  Patient was monitored on CIWA protocol and given therapy accordingly  Toxic metabolic encephalopathy resolved after resolution of shock and stabilization of alcohol withdrawal   Blood cultures did not show any growth on day 4  Thrombocytopenia improved during the hospital course with management of sepsis and patient did not have any signs of bleeding require transfusion  Patient was diagnosed with Clostridium difficile colitis and completed a 10 dose vancomycin course followed by improvement in stool caliber and symptoms  Patient developed acute renal failure superimposed on chronic kidney disease    This was likely secondary to hypovolemic shock and improved with resolution of shock and IV fluid resuscitation  Patient held development of transaminitis, likely secondary to sepsis  This continued to improve during the hospital course and was stable on discharge  HIV test was negative  Hepatitis-C antibody was low reactive and qualitative hepatitis-C by PCR test was pending and will be followed up outpatient  Patient was evaluated by Physical and Occupational therapy whose final recommendation was home with outpatient rehabilitation  Patient was provided the option of short-term rehab or home rehabilitation but patient declined  Physical Exam at Discharge  See exam in Family Medicine Progress Note 4/26/21    Medications   Current Discharge Medication List      CONTINUE these medications which have CHANGED    Details   lisinopril (ZESTRIL) 10 mg tablet Take 1 tablet (10 mg total) by mouth daily  Qty: 30 tablet, Refills: 2    Associated Diagnoses: Essential (primary) hypertension  HOLD FOR 1 WEEK AND THEN RESTART             Current Discharge Medication List      CONTINUE these medications which have NOT CHANGED    Details   albuterol (PROVENTIL HFA,VENTOLIN HFA) 90 mcg/act inhaler Inhale 2 puffs every 4 (four) hours as needed for wheezing  Qty: 1 Inhaler, Refills: 0    Associated Diagnoses: COPD (chronic obstructive pulmonary disease) ([facility])      amLODIPine (NORVASC) 5 mg tablet Take 1 tablet (5 mg total) by mouth daily  Qty: 30 tablet, Refills: 1    Associated Diagnoses: Essential (primary) hypertension      Blood Glucose Monitoring Suppl (ONE TOUCH ULTRA MINI) w/Device KIT Use as directed  Refills: 0      fluticasone-salmeterol (ADVAIR, WIXELA) 500-50 mcg/dose inhaler Inhale 1 puff every 12 (twelve) hours  Qty: 1 Inhaler, Refills: 3    Comments: Substitution to a formulary equivalent within the same pharmaceutical class is authorized    Associated Diagnoses: Chronic obstructive pulmonary disease, unspecified COPD type ([facility] )      folic acid (FOLVITE) 1 mg tablet Take 1 tablet (1 mg total) by mouth daily  Qty: 30 tablet, Refills: 3    Associated Diagnoses: Alcohol abuse      gabapentin (NEURONTIN) 300 mg capsule Take 1 capsule (300 mg total) by mouth 3 (three) times a day  Qty: 90 capsule, Refills: 3    Associated Diagnoses: Edema, spinal cord (HCC)      magnesium oxide (MAG-OX) 400 mg Take 1 tablet (400 mg total) by mouth daily  Qty: 90 tablet, Refills: 3    Associated Diagnoses: Muscle cramp      metoprolol tartrate (LOPRESSOR) 25 mg tablet Take 1 tablet (25 mg total) by mouth every 12 (twelve) hours  Qty: 60 tablet, Refills: 1    Associated Diagnoses: Essential (primary) hypertension      ONETOUCH DELICA LANCETS FINE MISC 3 (three) times a day Test  Refills: 0      pravastatin (PRAVACHOL) 40 mg tablet Take 1 tablet (40 mg total) by mouth daily with dinner  Qty: 30 tablet, Refills: 1    Associated Diagnoses: Essential (primary) hypertension      ranolazine (RANEXA) 500 mg 12 hr tablet Take 1 tablet (500 mg total) by mouth every 12 (twelve) hours  Qty: 60 tablet, Refills: 3    Associated Diagnoses: Essential (primary) hypertension      tamsulosin (FLOMAX) 0 4 mg Take 0 4 mg by mouth daily      thiamine 100 MG tablet Take 1 tablet (100 mg total) by mouth daily  Qty: 30 tablet, Refills: 3    Associated Diagnoses: Alcohol abuse            Current Discharge Medication List         Current Discharge Medication List         Allergies  No Known Allergies    Diet restrictions: none  Activity restrictions: none  Code Status: Level 1 - Full Code    Discharge Condition: stable    Discharge  Statement   I spent 30 minutes discharging the patient  This time was spent on the day of discharge  I had direct contact with the patient on the day of discharge  Additional documentation is required if more than 30 minutes were spent on discharge

## 2021-04-26 NOTE — PHYSICAL THERAPY NOTE
PT TREATMENT     04/26/21 1055   Note Type   Note Type Treatment   Pain Assessment   Pain Assessment Tool 0-10   Pain Score 7   Pain Location/Orientation Location: Back   Restrictions/Precautions   Other Precautions Chair Alarm; Bed Alarm;Contact/isolation; Fall Risk;Pain   General   Chart Reviewed Yes   Cognition   Overall Cognitive Status WFL   Subjective   Subjective "I want to walk"   Transfers   Sit to Stand 5  Supervision   Stand to Sit 5  Supervision   Stand pivot 5  Supervision   Additional items   (with walker)   Ambulation/Elevation   Gait pattern   (steppage, wide BIN)   Gait Assistance   (min assist progressing to supervision)   Assistive Device Rolling walker   Distance 50 feet, 200 feet with rolling walker, 20 feet without walker   Balance   Static Sitting Fair +   Dynamic Sitting Fair +   Static Standing Fair   Dynamic Standing Fair -   Ambulatory Fair -   Activity Tolerance   Activity Tolerance Patient tolerated treatment well   Assessment   Prognosis Good   Problem List Decreased strength;Decreased endurance; Impaired balance;Decreased mobility   Assessment Pt demonstrates much improved abiltiy to transfer and ambulate today  Pt is able to walk 200 feet with a walker on indoor level surfaces and is able to ambulate by holding furniture 20 feet  Pt is able to rise from a chair and a toilet without assist   Pt remains generally weak and would benefit from STR but declines  Pt agreed to OP PT upon DC  Resident and  aware  The patient's AM-PAC Basic Mobility Inpatient Short Form Raw Score is 22, Standardized Score is 47 4  A standardized score greater than 42 9 suggests the patient may benefit from discharge to home  Plan   Treatment/Interventions ADL retraining;Functional transfer training;LE strengthening/ROM; Therapeutic exercise; Endurance training;Gait training;Equipment eval/education;Patient/family training   Progress Progressing toward goals   PT Frequency 5x/wk Recommendation   PT Discharge Recommendation Home with outpatient rehabilitation   Equipment Recommended   (pt has a walker)   AM-PAC Basic Mobility Inpatient   Turning in Bed Without Bedrails 4   Lying on Back to Sitting on Edge of Flat Bed 4   Moving Bed to Chair 4   Standing Up From Chair 4   Walk in Room 3   Climb 3-5 Stairs 3   Basic Mobility Inpatient Raw Score 22   Basic Mobility Standardized Score 03 1   Licensure   [place] License Number  Tremaine DICKSON [id number]

## 2021-04-26 NOTE — INCIDENTAL FINDINGS
The following findings require follow up:  Radiographic finding CT cervical spine wo contrast   Finding: Increasing fluid in the posterior soft tissues along the upper cervical spine laminectomy bed which could be indicative of pseudomeningocele    Limited evaluation due to hardware artifact and absence of IV contrast    Follow up required: Neurosurgeon   Follow up should be done As soon as possible     Please notify the following clinician to assist with the follow up:   Dr Mariel Ryan, Neurosurgeon   Upcoming appointment: 6/17/2021 at 1:00PM

## 2021-04-26 NOTE — DISCHARGE INSTR - AVS FIRST PAGE
HOLD LISINOPRIL 10MG DAILY FOR 1 WEEK AND THEN RESTART  CHECK "BASIC METABOLIC PANEL" LAB 1 WEEK AFTER RESTARTING LISINOPRIL 10 MG DAILY

## 2021-04-26 NOTE — PROGRESS NOTES
[address] [facility] 67 [address] 84 [facility] Practice Progress Note - Dmitry Parker 63 y o  male MRN: [id number]    Unit/Bed#: [address] [place] [address] Encounter: [id number]      Assessment/Plan:  * Hypovolemic shock [facility])  Assessment & Plan  Patient presented with hypotension to ED systolic blood pressure in the 60s, tachypnea, with lactic acidosis 7 1 and thrombocytopenia of 28  Likely secondary severe dehydration with setting of alcohol use and multiple days of diarrhea with vomiting and poor oral intake  His chest x-ray showed right lower lobe consolidation  COVID-19 negative  CT chest abdomen pelvis showed diffuse severe fatty liver with no obvious source of infection  UA positive for blood and protein, no nitrites or leukocytes  Procalcitonin elevated at 1 32  Systolic blood pressure improved to 90s status post IV fluid boluses but patient developed have attention evening of admission  TLC in line placed an additional 1 L fluid bolus was given along with briefly placed on levo  Currently off any vaso actives  · Blood cultures x2 show no growth at 72 hours, await final result  · Patient completed IV antibiotic course  · Stool positive for C diff - completed vancomycin 125 mg 10 dose course  · Monitor fluid status and I&Os  · Stable  · PT and OT recommend short-term rehab  Awaiting placement  Transaminitis  Assessment & Plan  , ALT 80, with alk phos of 176 alcohol use, dehydration and hypovolemic shock on day of admission  Total of 6 5 L of fluid given pulse far at time of transfer  · Avoid hypotension and trend CMP daily  · AST/ALT/ALP:  171/80/176 > 132/77/230 > 65/58/221 > 37/52/196  Resolving  · Hepatitis-C antibody positive for low reactive  Qualitative hepatitis-C PCR pending  · HIV nonreactive  Peripheral smear showed elevated monocytes and reduced platelets  · Continue to monitor platelets  Hypocalcemia  Assessment & Plan  Persistent despite receiving 9 g IV calcium gluconate    Initial at 0 91  4/22 level of 0 99  After receiving 1 g IV calcium chloride increased to 1 12  Hypomagnesemia corrected  Negative for pancreatitis  · Monitor for symptoms of hypocalcemia  · Vitamin-D panel pending  · Replete as needed        Toxic metabolic encephalopathy-resolved as of 4/26/2021  Assessment & Plan  Ethanol level of 201 mg/dL on arrival status post phenobarbital loading  · Neuro checks  · Avoid sedating medications  · Delirium precautions, sleep hygiene, fall precautions  · Ammonia level, low less than 10  · Alert and oriented x3 at this time  Increased anion gap metabolic acidosis-resolved as of 4/25/2021  Assessment & Plan  Anion gap of 17 on arrival with lactic acidosis  Overnight on day of admission improved 14 and subsequently closed  UA was negative for ketones  Status post 6 5 L IV fluid at time of transfer  · Initial CK elevated at 501 with history of recent falls with his alcohol use  Subsequent CK of 246  · Patient still having diarrhea which is likely contributing to his acidosis  · Mild ENMA on admission of 1 97 from baseline, improving but not resolved  See plan under "acute on chronic kidney failure"    Acute on chronic kidney failure [facility])  Assessment & Plan  Lab Results   Component Value Date    EGFR 52 04/26/2021    EGFR 48 04/25/2021    EGFR 41 04/24/2021    CREATININE 1 45 (H) 04/26/2021    CREATININE 1 55 (H) 04/25/2021    CREATININE 1 76 (H) 04/24/2021   Patient's creatinine very variable and elevated on admissions with ENMA on chronic kidney disease but baseline appears to be around 1 2 with possible recent baseline bit higher  · Creatinine on admission 1 97 status post IV contrast and 4 L IV fluids for septic shock in ED  · Hypotension likely contributed ENMA along with dehydration  · Monitor I&Os and currently on oral diet  Will consider additional fluids if necessary  · Hold home lisinopril  · Trend BMP daily  · Avoid nephrotoxic agents     · Discontinued all IV fluids  History of Atrial fibrillation ([facility] [address] )  Assessment & Plan  At Time of transfer to [facility] floor sinus tachycardia  Patient is not on anticoagulation outpatient setting due to chronic alcohol use with imbalance and high risk for bleeding  Overnight on day of admission patient was reported by ICU team to have few short runs of atrial fibrillation  · Will continue telemetry  · Continue Home metoprolol 25 mg b i d  Anticoagulation held at this time due to thrombocytopenia  Diastolic heart failure (HCC)  Assessment & Plan  Wt Readings from Last 3 Encounters:   04/25/21 84 kg (185 lb 3 oz)   04/12/21 82 7 kg (182 lb 4 8 oz)   03/11/21 88 9 kg (196 lb)   Echo in December 2020 ejection fraction 55-60%, moderate concentric hypertrophy, grade 1 diastolic dysfunction  · Monitor daily weight and caution with replacement         Diarrhea  Assessment & Plan  Patient had concern for 6 days of nausea vomiting and diarrhea on presentation which continued through night of admission and on day of transfer to [facility]  Continues to have diarrhea  · CT chest abdomen pelvis showed diffuse fatty liver with no acute findings  · C diff toxin by PCR was positive  P o  Vancomycin 125 mg 10 dose course completed  · More formed stool  Cervical spinal stenosis  Assessment & Plan  Status post C-spine fixation 12/20 with PT/OT and C-collar use OP  Patient reports falling 2 days ago while intoxicated and has multiple contusions on body including forehead  CT spine cervical without contrast on 4/21 showing:  ---No cervical spine fracture or traumatic malalignment  ---Unchanged anterior and posterior cervical fusion as above  No acute hardware complication  Please note the left T1 pedicle screw traverses the upper margin of the adjacent T1-T2 neural foramen  This was seen previously  Correlate for any radiculopathy in this territory    ---Increasing fluid in the posterior soft tissues along the upper cervical spine laminectomy bed which could be indicative of pseudomeningocele  Limited evaluation due to hardware artifact and absence of IV contrast   ---Moderately advanced multilevel neuroforaminal narrowing which is most pronounced at C3-4 on the right  · No focal deficits  Established with Neurosurgery outpatient and will continue to follow-up regarding possible pseudomeningocele  Hyponatremia  Assessment & Plan  Likely hypovolemic hypotonic hyponatremia with dehydration secondary to diarrhea and poor oral intake  · Sodium is stable  · Cortisol level within normal limits    Alcohol abuse  Assessment & Plan  Drinks about 1 qt of vodka daily  Last drink day prior to admission reported  Alcohol level of 210 on arrival   12/20 admission require C-spine fixation at [facility] and C-collar OP  He has failed multiple attempts at cessation and previously expressed interest in detox center  Status post phenobarbital loading on night of admission  · At time of transfer patient observed to have moderate tremors on exam   · CIWA protocol and will consider additional phenobarb recall with showing further signs of withdrawal- no benzodiazepine required over past 24 hours  · Consulted case management for outpatient rehab/detox but patient declined  · Fall precautions    Thrombocytopenia ([facility] [address] )  Assessment & Plan  Thrombocytopenia in the past was previously attributed to chronic alcohol use  Unclear etiology this time, likely secondary to sepsis  · Given current platelets at 23 along with poor balance and alcohol abuse with risk of bleeding will hold anticoagulation at this time  · Platelets: 28 > 23 > 24 > 46 > 87>133  Resolving  · Consider transfusion if less than 20 or suspected bleed    Nicotine abuse  Assessment & Plan  1PPD day smoker  · Nicotine patches encouraged smoking cessation    CAD (coronary artery disease)  Assessment & Plan  Coronary disease status post CABG 2004, PCI to LMCA 2014   While recommendation was made initially for Plavix/Eliquis, not started given history of falls, chronic alcohol use and high risk of bleeding  · Home statin held due to transaminitis  · Continue metoprolol 25 mg q 12h, ranolazine 500 mg q 12h and amlodipine 5 mg daily    Dyslipidemia  Assessment & Plan  Home statin held due to transaminitis    Hypomagnesemia-resolved as of 4/26/2021  Assessment & Plan  Magnesium level 0 8 on arrival S/P 6 gIV magnesium  4/22 Mag level of 2 6, 4/23 1 6, 4/24 1 6  · Trend daily  · replete as necessary    Essential hypertension  Assessment & Plan  Patient presented hypotensive with systolic blood pressure in 60s  Time of transfer to [facility] surge floor patient status post 6 5 L of fluid  · Currently on amlodipine 5 mg daily, metoprolol 25 mg q 12h  · Home lisinopril 10 mg daily still being held due to ENMA  Type 2 diabetes mellitus [facility])  Assessment & Plan  Lab Results   Component Value Date    HGBA1C 5 3 12/07/2020     Last hemoglobin A1c is 5 3 and patient currently controlled off diabetic medications  ISS algorithm 2 ordered pre meal with blood sugar checks t i d  Stable    Hepatitis C antibody test positive  Assessment & Plan  Hepatitis-C antibody test is low reactive  Ordered qualitative PCR test     COPD (chronic obstructive pulmonary disease) ([facility] [address] )  Assessment & Plan  1PPD smoker  No home O2  · Continue on home Breo Ellipta and p r n  Albuterol    Subjective:   Patient seen and examined at bedside  Denies chest pain, dyspnea, headache, abdominal pain reports more formed stool  Objective:     Vitals: Blood pressure 157/96, pulse 100, temperature 97 8 °F (36 6 °C), resp  rate 18, height 6' 2" (1 88 m), weight 84 kg (185 lb 3 oz), SpO2 98 %  ,Body mass index is 23 78 kg/m²    Wt Readings from Last 3 Encounters:   04/25/21 84 kg (185 lb 3 oz)   04/12/21 82 7 kg (182 lb 4 8 oz)   03/11/21 88 9 kg (196 lb)       Intake/Output Summary (Last 24 hours) at 4/26/2021 1023  Last data filed at 4/26/2021 0601  Gross per 24 hour   Intake 1160 ml   Output 2500 ml   Net -1340 ml       Physical Exam: General appearance: alert and oriented, in no acute distress  Head: Normocephalic, without obvious abnormality, atraumatic  Lungs: clear to auscultation bilaterally  Heart: regular rate and rhythm and S1, S2 normal  Abdomen: soft, non-tender; bowel sounds normal; no masses,  no organomegaly  Extremities: No edema in bilateral lower extremities     Recent Results (from the past 24 hour(s))   Fingerstick Glucose (POCT)    Collection Time: 04/25/21 11:57 AM   Result Value Ref Range    POC Glucose 97 65 - 140 mg/dl   Fingerstick Glucose (POCT)    Collection Time: 04/25/21  3:49 PM   Result Value Ref Range    POC Glucose 118 65 - 140 mg/dl   CBC and differential    Collection Time: 04/26/21  6:43 AM   Result Value Ref Range    WBC 5 91 4 31 - 10 16 Thousand/uL    RBC 2 99 (L) 3 88 - 5 62 Million/uL    Hemoglobin 9 7 (L) 12 0 - 17 0 g/dL    Hematocrit 29 3 (L) 36 5 - 49 3 %    MCV 98 82 - 98 fL    MCH 32 4 26 8 - 34 3 pg    MCHC 33 1 31 4 - 37 4 g/dL    RDW 14 0 11 6 - 15 1 %    MPV 10 9 8 9 - 12 7 fL    Platelets 132 (L) 265 - 390 Thousands/uL    nRBC 0 /100 WBCs   Comprehensive metabolic panel    Collection Time: 04/26/21  6:43 AM   Result Value Ref Range    Sodium 134 (L) 136 - 145 mmol/L    Potassium 4 0 3 5 - 5 3 mmol/L    Chloride 97 (L) 100 - 108 mmol/L    CO2 29 21 - 32 mmol/L    ANION GAP 8 4 - 13 mmol/L    BUN 17 5 - 25 mg/dL    Creatinine 1 45 (H) 0 60 - 1 30 mg/dL    Glucose 107 65 - 140 mg/dL    Calcium 8 3 8 3 - 10 1 mg/dL    Corrected Calcium 9 4 8 3 - 10 1 mg/dL    AST 37 5 - 45 U/L    ALT 52 12 - 78 U/L    Alkaline Phosphatase 196 (H) 46 - 116 U/L    Total Protein 6 5 6 4 - 8 2 g/dL    Albumin 2 6 (L) 3 5 - 5 0 g/dL    Total Bilirubin 0 57 0 20 - 1 00 mg/dL    eGFR 52 ml/min/1 73sq m   Phosphorus    Collection Time: 04/26/21  6:43 AM   Result Value Ref Range    Phosphorus 2 9 2 7 - 4 5 mg/dL Magnesium    Collection Time: 04/26/21  6:43 AM   Result Value Ref Range    Magnesium 1 9 1 6 - 2 6 mg/dL   Manual Differential(PHLEBS Do Not Order)    Collection Time: 04/26/21  6:43 AM   Result Value Ref Range    Segmented % 46 43 - 75 %    Lymphocytes % 16 14 - 44 %    Monocytes % 34 (H) 4 - 12 %    Eosinophils, % 3 0 - 6 %    Basophils % 0 0 - 1 %    Atypical Lymphocytes % 1 (H) <=0 %    Absolute Neutrophils 2 72 1 85 - 7 62 Thousand/uL    Lymphocytes Absolute 0 95 0 60 - 4 47 Thousand/uL    Monocytes Absolute 2 01 (H) 0 00 - 1 22 Thousand/uL    Eosinophils Absolute 0 18 0 00 - 0 40 Thousand/uL    Basophils Absolute 0 00 0 00 - 0 10 Thousand/uL    Total Counted 100     RBC Morphology Present     Anisocytosis Present     Hypochromia Present     Platelet Estimate Borderline (A) Adequate   Fingerstick Glucose (POCT)    Collection Time: 04/26/21  7:17 AM   Result Value Ref Range    POC Glucose 100 65 - 140 mg/dl       Current Facility-Administered Medications   Medication Dose Route Frequency Provider Last Rate Last Admin    acetaminophen (TYLENOL) tablet 650 mg  650 mg Oral Q6H PRN Arvid Case, CRNP   650 mg at 04/26/21 1020    albuterol (PROVENTIL HFA,VENTOLIN HFA) inhaler 2 puff  2 puff Inhalation Q4H PRN Arvid Case, CRNP        amLODIPine (NORVASC) tablet 5 mg  5 mg Oral Daily Melissa Rose, DO   5 mg at 04/26/21 1022    fluticasone-vilanterol (BREO ELLIPTA) 200-25 MCG/INH inhaler 1 puff  1 puff Inhalation Daily Arvid Case, CRNP   1 puff at 86/21/49 4520    folic acid (FOLVITE) tablet 1 mg  1 mg Oral Daily Arvid Case, CRNP   1 mg at 04/26/21 1021    gabapentin (NEURONTIN) capsule 300 mg  300 mg Oral TID Arvid Case, CRNP   300 mg at 04/26/21 1022    heparin (porcine) subcutaneous injection 5,000 Units  5,000 Units Subcutaneous Q8H [address], DO   5,000 Units at 04/26/21 0626    insulin lispro (HumaLOG) 100 units/mL subcutaneous injection 1-5 Units  1-5 Units Subcutaneous TID AC CHELSEA Monroy   1 Units at 04/22/21 1152    metoprolol tartrate (LOPRESSOR) tablet 25 mg  25 mg Oral Q12H [address] [place] CLAUDIA Garcia MD   25 mg at 04/26/21 1023    nicotine (NICODERM CQ) 21 mg/24 hr TD 24 hr patch 21 mg  21 mg Transdermal Daily CHELSEA Monroy   21 mg at 04/25/21 1019    ondansetron (ZOFRAN) injection 4 mg  4 mg Intravenous Q6H PRN CHELSEA Monroy        pneumococcal 23-valent polysaccharide vaccine (PNEUMOVAX-23) injection 0 5 mL  0 5 mL Subcutaneous Prior to discharge CHELSEA Monroy        ranolazine (RANEXA) 12 hr tablet 500 mg  500 mg Oral Q12H [address] CHELSEA Monroy   500 mg at 04/26/21 1021    tamsulosin (FLOMAX) capsule 0 4 mg  0 4 mg Oral Daily CHELSEA Monroy   0 4 mg at 04/26/21 1021    thiamine tablet 100 mg  100 mg Oral Daily CHELSEA Monroy   100 mg at 04/26/21 1021       Invasive Devices     Peripheral Intravenous Line            Peripheral IV 04/23/21 Left Arm 3 days          Drain            Closed/Suction Drain Posterior;Right Neck Accordion 10 Fr  130 days                Lab, Imaging and other studies: I have personally reviewed pertinent reports      VTE Pharmacologic Prophylaxis: Reason for no pharmacologic prophylaxis Thrombocytopenia  VTE Mechanical Prophylaxis: sequential compression device    David Lane DO

## 2021-04-26 NOTE — NURSING NOTE
The patient was given his discharge AVS and was reviewed with him at the bedside  Copy of AVS provided  The patient is discharged to home in no distress accompanied by [facility]

## 2021-04-27 ENCOUNTER — TRANSITIONAL CARE MANAGEMENT (OUTPATIENT)
Dept: FAMILY MEDICINE CLINIC | Facility: CLINIC | Age: 61
End: 2021-04-27

## 2021-04-29 LAB — HCV RNA SERPL QL NAA+PROBE: NEGATIVE

## 2021-05-03 LAB
25(OH)D2 SERPL-MCNC: <1 NG/ML
25(OH)D3 SERPL-MCNC: 7.4 NG/ML
25(OH)D3+25(OH)D2 SERPL-MCNC: 7.6 NG/ML

## 2021-05-05 ENCOUNTER — PATIENT OUTREACH (OUTPATIENT)
Dept: FAMILY MEDICINE CLINIC | Facility: CLINIC | Age: 61
End: 2021-05-05

## 2021-05-05 NOTE — PROGRESS NOTES
Outreach attempted  Recording on device advising that recipient cannot accept calls at this time  Outreach attempted x 2

## 2021-05-11 ENCOUNTER — PATIENT OUTREACH (OUTPATIENT)
Dept: FAMILY MEDICINE CLINIC | Facility: CLINIC | Age: 61
End: 2021-05-11

## 2021-05-11 NOTE — PROGRESS NOTES
MERT RAMÍREZ attempted to call patient ([phone]) to follow up regarding [facility] program  Patient did not answer and is not accepting calls at this time  MERT RAMÍREZ unable to leave message  MERT Ramírez will continue to follow up regarding

## 2021-05-13 ENCOUNTER — HOSPITAL ENCOUNTER (OUTPATIENT)
Facility: HOSPITAL | Age: 61
Setting detail: OBSERVATION
Discharge: HOME | End: 2021-05-14
Attending: EMERGENCY MEDICINE | Admitting: FAMILY MEDICINE
Payer: MEDICARE

## 2021-05-13 ENCOUNTER — APPOINTMENT (EMERGENCY)
Dept: RADIOLOGY | Facility: HOSPITAL | Age: 61
End: 2021-05-13
Payer: MEDICARE

## 2021-05-13 DIAGNOSIS — R53.81 PHYSICAL DECONDITIONING: ICD-10-CM

## 2021-05-13 DIAGNOSIS — Z72.0 NICOTINE ABUSE: Chronic | ICD-10-CM

## 2021-05-13 DIAGNOSIS — F10.10 ALCOHOL ABUSE: Chronic | ICD-10-CM

## 2021-05-13 DIAGNOSIS — F10.239 ALCOHOL WITHDRAWAL (HCC): ICD-10-CM

## 2021-05-13 DIAGNOSIS — A04.72 CLOSTRIDIUM DIFFICILE DIARRHEA: ICD-10-CM

## 2021-05-13 DIAGNOSIS — R25.2 MUSCLE CRAMP: ICD-10-CM

## 2021-05-13 DIAGNOSIS — Z91.19 MEDICAL NON-COMPLIANCE: Chronic | ICD-10-CM

## 2021-05-13 DIAGNOSIS — R53.1 GENERALIZED WEAKNESS: ICD-10-CM

## 2021-05-13 DIAGNOSIS — I67.1 ANTERIOR COMMUNICATING ARTERY ANEURYSM: ICD-10-CM

## 2021-05-13 DIAGNOSIS — R07.9 CHEST PAIN, UNSPECIFIED: ICD-10-CM

## 2021-05-13 DIAGNOSIS — F10.230 ALCOHOL WITHDRAWAL SYNDROME WITHOUT COMPLICATION (HCC): ICD-10-CM

## 2021-05-13 DIAGNOSIS — E83.42 HYPOMAGNESEMIA: Primary | ICD-10-CM

## 2021-05-13 PROBLEM — E63.8 IMBALANCED NUTRITION: Status: ACTIVE | Noted: 2021-05-13

## 2021-05-13 LAB
ALBUMIN SERPL BCP-MCNC: 3.2 G/DL (ref 3.5–5)
ALP SERPL-CCNC: 233 U/L (ref 46–116)
ALT SERPL W P-5'-P-CCNC: 56 U/L (ref 12–78)
AMPHETAMINES SERPL QL SCN: NEGATIVE
ANION GAP SERPL CALCULATED.3IONS-SCNC: 17 MMOL/L (ref 4–13)
AST SERPL W P-5'-P-CCNC: 120 U/L (ref 5–45)
BACTERIA UR QL AUTO: ABNORMAL /HPF
BARBITURATES UR QL: NEGATIVE
BASOPHILS # BLD AUTO: 0.16 THOUSANDS/ΜL (ref 0–0.1)
BASOPHILS NFR BLD AUTO: 2 % (ref 0–1)
BENZODIAZ UR QL: NEGATIVE
BILIRUB SERPL-MCNC: 0.56 MG/DL (ref 0.2–1)
BILIRUB UR QL STRIP: NEGATIVE
BUN SERPL-MCNC: 15 MG/DL (ref 5–25)
CALCIUM ALBUM COR SERPL-MCNC: 9 MG/DL (ref 8.3–10.1)
CALCIUM SERPL-MCNC: 8.4 MG/DL (ref 8.3–10.1)
CHLORIDE SERPL-SCNC: 100 MMOL/L (ref 100–108)
CLARITY UR: CLEAR
CO2 SERPL-SCNC: 23 MMOL/L (ref 21–32)
COCAINE UR QL: NEGATIVE
COLOR UR: YELLOW
CREAT SERPL-MCNC: 1.42 MG/DL (ref 0.6–1.3)
EOSINOPHIL # BLD AUTO: 0.01 THOUSAND/ΜL (ref 0–0.61)
EOSINOPHIL NFR BLD AUTO: 0 % (ref 0–6)
ERYTHROCYTE [DISTWIDTH] IN BLOOD BY AUTOMATED COUNT: 15.3 % (ref 11.6–15.1)
ETHANOL SERPL-MCNC: 113 MG/DL (ref 0–3)
GFR SERPL CREATININE-BSD FRML MDRD: 54 ML/MIN/1.73SQ M
GLUCOSE SERPL-MCNC: 80 MG/DL (ref 65–140)
GLUCOSE UR STRIP-MCNC: NEGATIVE MG/DL
HCT VFR BLD AUTO: 34 % (ref 36.5–49.3)
HGB BLD-MCNC: 11.1 G/DL (ref 12–17)
HGB UR QL STRIP.AUTO: ABNORMAL
HYALINE CASTS #/AREA URNS LPF: ABNORMAL /LPF
IMM GRANULOCYTES # BLD AUTO: 0.03 THOUSAND/UL (ref 0–0.2)
IMM GRANULOCYTES NFR BLD AUTO: 0 % (ref 0–2)
KETONES UR STRIP-MCNC: ABNORMAL MG/DL
LEUKOCYTE ESTERASE UR QL STRIP: NEGATIVE
LYMPHOCYTES # BLD AUTO: 0.66 THOUSANDS/ΜL (ref 0.6–4.47)
LYMPHOCYTES NFR BLD AUTO: 7 % (ref 14–44)
MAGNESIUM SERPL-MCNC: 0.9 MG/DL (ref 1.6–2.6)
MAGNESIUM SERPL-MCNC: 1.1 MG/DL (ref 1.6–2.6)
MCH RBC QN AUTO: 33 PG (ref 26.8–34.3)
MCHC RBC AUTO-ENTMCNC: 32.6 G/DL (ref 31.4–37.4)
MCV RBC AUTO: 101 FL (ref 82–98)
METHADONE UR QL: NEGATIVE
MONOCYTES # BLD AUTO: 0.49 THOUSAND/ΜL (ref 0.17–1.22)
MONOCYTES NFR BLD AUTO: 5 % (ref 4–12)
NEUTROPHILS # BLD AUTO: 7.76 THOUSANDS/ΜL (ref 1.85–7.62)
NEUTS SEG NFR BLD AUTO: 86 % (ref 43–75)
NITRITE UR QL STRIP: NEGATIVE
NON-SQ EPI CELLS URNS QL MICRO: ABNORMAL /HPF
NRBC BLD AUTO-RTO: 0 /100 WBCS
OPIATES UR QL SCN: NEGATIVE
OXYCODONE+OXYMORPHONE UR QL SCN: NEGATIVE
PCP UR QL: NEGATIVE
PH UR STRIP.AUTO: 5.5 [PH]
PLATELET # BLD AUTO: 219 THOUSANDS/UL (ref 149–390)
PMV BLD AUTO: 10.5 FL (ref 8.9–12.7)
POTASSIUM SERPL-SCNC: 4.9 MMOL/L (ref 3.5–5.3)
PROT SERPL-MCNC: 7.6 G/DL (ref 6.4–8.2)
PROT UR STRIP-MCNC: ABNORMAL MG/DL
RBC # BLD AUTO: 3.36 MILLION/UL (ref 3.88–5.62)
RBC #/AREA URNS AUTO: ABNORMAL /HPF
SODIUM SERPL-SCNC: 140 MMOL/L (ref 136–145)
SP GR UR STRIP.AUTO: 1.02 (ref 1–1.03)
THC UR QL: NEGATIVE
TROPONIN I SERPL-MCNC: <0.02 NG/ML
UROBILINOGEN UR QL STRIP.AUTO: 0.2 E.U./DL
WBC # BLD AUTO: 9.11 THOUSAND/UL (ref 4.31–10.16)
WBC #/AREA URNS AUTO: ABNORMAL /HPF

## 2021-05-13 PROCEDURE — 82077 ASSAY SPEC XCP UR&BREATH IA: CPT | Performed by: EMERGENCY MEDICINE

## 2021-05-13 PROCEDURE — 71045 X-RAY EXAM CHEST 1 VIEW: CPT

## 2021-05-13 PROCEDURE — 70496 CT ANGIOGRAPHY HEAD: CPT

## 2021-05-13 PROCEDURE — 99285 EMERGENCY DEPT VISIT HI MDM: CPT | Performed by: EMERGENCY MEDICINE

## 2021-05-13 PROCEDURE — 80053 COMPREHEN METABOLIC PANEL: CPT | Performed by: EMERGENCY MEDICINE

## 2021-05-13 PROCEDURE — 80307 DRUG TEST PRSMV CHEM ANLYZR: CPT | Performed by: EMERGENCY MEDICINE

## 2021-05-13 PROCEDURE — G1004 CDSM NDSC: HCPCS

## 2021-05-13 PROCEDURE — 99285 EMERGENCY DEPT VISIT HI MDM: CPT

## 2021-05-13 PROCEDURE — 83735 ASSAY OF MAGNESIUM: CPT | Performed by: EMERGENCY MEDICINE

## 2021-05-13 PROCEDURE — 81001 URINALYSIS AUTO W/SCOPE: CPT | Performed by: EMERGENCY MEDICINE

## 2021-05-13 PROCEDURE — 94760 N-INVAS EAR/PLS OXIMETRY 1: CPT

## 2021-05-13 PROCEDURE — 93005 ELECTROCARDIOGRAM TRACING: CPT

## 2021-05-13 PROCEDURE — 85025 COMPLETE CBC W/AUTO DIFF WBC: CPT | Performed by: EMERGENCY MEDICINE

## 2021-05-13 PROCEDURE — 84484 ASSAY OF TROPONIN QUANT: CPT | Performed by: EMERGENCY MEDICINE

## 2021-05-13 PROCEDURE — 83735 ASSAY OF MAGNESIUM: CPT | Performed by: STUDENT IN AN ORGANIZED HEALTH CARE EDUCATION/TRAINING PROGRAM

## 2021-05-13 PROCEDURE — 36415 COLL VENOUS BLD VENIPUNCTURE: CPT | Performed by: EMERGENCY MEDICINE

## 2021-05-13 PROCEDURE — 70498 CT ANGIOGRAPHY NECK: CPT

## 2021-05-13 RX ORDER — FLUTICASONE FUROATE AND VILANTEROL 200; 25 UG/1; UG/1
1 POWDER RESPIRATORY (INHALATION)
Status: DISCONTINUED | OUTPATIENT
Start: 2021-05-14 | End: 2021-05-14 | Stop reason: HOSPADM

## 2021-05-13 RX ORDER — LORAZEPAM 2 MG/ML
2 INJECTION INTRAMUSCULAR ONCE
Status: COMPLETED | OUTPATIENT
Start: 2021-05-13 | End: 2021-05-13

## 2021-05-13 RX ORDER — LISINOPRIL 10 MG/1
10 TABLET ORAL DAILY
Status: DISCONTINUED | OUTPATIENT
Start: 2021-05-14 | End: 2021-05-14 | Stop reason: HOSPADM

## 2021-05-13 RX ORDER — ALBUTEROL SULFATE 2.5 MG/3ML
2.5 SOLUTION RESPIRATORY (INHALATION) EVERY 6 HOURS PRN
Status: DISCONTINUED | OUTPATIENT
Start: 2021-05-13 | End: 2021-05-14 | Stop reason: HOSPADM

## 2021-05-13 RX ORDER — FOLIC ACID 1 MG/1
1 TABLET ORAL DAILY
Status: DISCONTINUED | OUTPATIENT
Start: 2021-05-14 | End: 2021-05-14 | Stop reason: HOSPADM

## 2021-05-13 RX ORDER — GABAPENTIN 300 MG/1
300 CAPSULE ORAL 3 TIMES DAILY
Status: DISCONTINUED | OUTPATIENT
Start: 2021-05-13 | End: 2021-05-14 | Stop reason: HOSPADM

## 2021-05-13 RX ORDER — LORAZEPAM 2 MG/ML
0.5 INJECTION INTRAMUSCULAR EVERY 8 HOURS
Status: DISCONTINUED | OUTPATIENT
Start: 2021-05-13 | End: 2021-05-14

## 2021-05-13 RX ORDER — ONDANSETRON 4 MG/1
4 TABLET, ORALLY DISINTEGRATING ORAL EVERY 6 HOURS PRN
Status: DISCONTINUED | OUTPATIENT
Start: 2021-05-13 | End: 2021-05-14 | Stop reason: HOSPADM

## 2021-05-13 RX ORDER — TAMSULOSIN HYDROCHLORIDE 0.4 MG/1
0.4 CAPSULE ORAL DAILY
Status: DISCONTINUED | OUTPATIENT
Start: 2021-05-14 | End: 2021-05-14 | Stop reason: HOSPADM

## 2021-05-13 RX ORDER — LORAZEPAM 2 MG/ML
0.5 INJECTION INTRAMUSCULAR EVERY 6 HOURS PRN
Status: DISCONTINUED | OUTPATIENT
Start: 2021-05-13 | End: 2021-05-14

## 2021-05-13 RX ORDER — MAGNESIUM SULFATE HEPTAHYDRATE 40 MG/ML
2 INJECTION, SOLUTION INTRAVENOUS ONCE
Status: COMPLETED | OUTPATIENT
Start: 2021-05-13 | End: 2021-05-13

## 2021-05-13 RX ORDER — METOCLOPRAMIDE 10 MG/1
10 TABLET ORAL
Status: DISCONTINUED | OUTPATIENT
Start: 2021-05-14 | End: 2021-05-14 | Stop reason: HOSPADM

## 2021-05-13 RX ORDER — PRAVASTATIN SODIUM 40 MG
40 TABLET ORAL
Status: DISCONTINUED | OUTPATIENT
Start: 2021-05-14 | End: 2021-05-13

## 2021-05-13 RX ORDER — RANOLAZINE 500 MG/1
500 TABLET, EXTENDED RELEASE ORAL EVERY 12 HOURS SCHEDULED
Status: DISCONTINUED | OUTPATIENT
Start: 2021-05-13 | End: 2021-05-14 | Stop reason: HOSPADM

## 2021-05-13 RX ORDER — PRAVASTATIN SODIUM 40 MG
40 TABLET ORAL
Status: DISCONTINUED | OUTPATIENT
Start: 2021-05-13 | End: 2021-05-14 | Stop reason: HOSPADM

## 2021-05-13 RX ORDER — NICOTINE 21 MG/24HR
1 PATCH, TRANSDERMAL 24 HOURS TRANSDERMAL DAILY
Status: DISCONTINUED | OUTPATIENT
Start: 2021-05-14 | End: 2021-05-14 | Stop reason: HOSPADM

## 2021-05-13 RX ORDER — LANOLIN ALCOHOL/MO/W.PET/CERES
100 CREAM (GRAM) TOPICAL DAILY
Status: DISCONTINUED | OUTPATIENT
Start: 2021-05-14 | End: 2021-05-14 | Stop reason: HOSPADM

## 2021-05-13 RX ORDER — AMLODIPINE BESYLATE 5 MG/1
5 TABLET ORAL DAILY
Status: DISCONTINUED | OUTPATIENT
Start: 2021-05-14 | End: 2021-05-14 | Stop reason: HOSPADM

## 2021-05-13 RX ADMIN — IOHEXOL 85 ML: 350 INJECTION, SOLUTION INTRAVENOUS at 17:04

## 2021-05-13 RX ADMIN — RANOLAZINE 500 MG: 500 TABLET, FILM COATED, EXTENDED RELEASE ORAL at 21:04

## 2021-05-13 RX ADMIN — LORAZEPAM 2 MG: 2 INJECTION INTRAMUSCULAR; INTRAVENOUS at 18:21

## 2021-05-13 RX ADMIN — METOPROLOL TARTRATE 25 MG: 25 TABLET, FILM COATED ORAL at 21:04

## 2021-05-13 RX ADMIN — MAGNESIUM SULFATE HEPTAHYDRATE 2 G: 40 INJECTION, SOLUTION INTRAVENOUS at 18:21

## 2021-05-13 RX ADMIN — MAGNESIUM OXIDE TAB 400 MG (241.3 MG ELEMENTAL MG) 400 MG: 400 (241.3 MG) TAB at 21:06

## 2021-05-13 RX ADMIN — SODIUM CHLORIDE, SODIUM LACTATE, POTASSIUM CHLORIDE, AND CALCIUM CHLORIDE 500 ML: .6; .31; .03; .02 INJECTION, SOLUTION INTRAVENOUS at 22:00

## 2021-05-13 RX ADMIN — LORAZEPAM 0.5 MG: 2 INJECTION INTRAMUSCULAR; INTRAVENOUS at 21:06

## 2021-05-13 RX ADMIN — PRAVASTATIN SODIUM 40 MG: 40 TABLET ORAL at 21:04

## 2021-05-13 RX ADMIN — GABAPENTIN 300 MG: 300 CAPSULE ORAL at 21:04

## 2021-05-13 NOTE — ED PROVIDER NOTES
History  Chief Complaint   Patient presents with    Weakness - Generalized     States he thinks his blood pressure is low because he feels very weak , he feels like he will pass out and mild diarrhea  Patient with tremors, states he is drinking a 5th of vodka daily again, started drinking again 4 days ago , last drink yesterday   Chest Pain     States he started with left sided chest pain this morning      Patient here with complaint of generalized weakness and dizziness  He admits to prolonged alcohol use over the past 4 days - drinking a 5th of vodka daily  Patient states that his last drink was last night  He denies head trauma  Patient also complained of mild left-sided chest pain which is intermittent  He denies nausea, vomiting, fevers or chills  Patient has a prior history of hypertension, hyperlipidemia, COPD, diabetes  History provided by:  Patient   used: No    Chest Pain  Pain location:  L chest  Pain quality: aching    Pain radiates to:  Does not radiate  Pain radiates to the back: no    Pain severity:  No pain  Onset quality:  Gradual  Timing:  Constant  Progression:  Worsening  Chronicity:  New  Relieved by:  None tried  Worsened by:  Nothing tried  Ineffective treatments:  None tried  Associated symptoms: no abdominal pain, no back pain, no cough, no dysphagia, no fever, no nausea, no palpitations, no shortness of breath and not vomiting    Risk factors: male sex        Prior to Admission Medications   Prescriptions Last Dose Informant Patient Reported? Taking?    Blood Glucose Monitoring Suppl (ONE TOUCH ULTRA MINI) w/Device KIT  Self Yes No   Sig: Use as directed   ONETOUCH DELICA LANCETS FINE MISC  Self Yes No   Sig: 3 (three) times a day Test   albuterol (PROVENTIL HFA,VENTOLIN HFA) 90 mcg/act inhaler Unknown at Unknown time Self No No   Sig: Inhale 2 puffs every 4 (four) hours as needed for wheezing   amLODIPine (NORVASC) 5 mg tablet 5/13/2021 at Unknown time No Yes   Sig: Take 1 tablet (5 mg total) by mouth daily   fluticasone-salmeterol (ADVAIR, WIXELA) 500-50 mcg/dose inhaler Unknown at Unknown time  No No   Sig: Inhale 1 puff every 12 (twelve) hours   folic acid (FOLVITE) 1 mg tablet 5/13/2021 at Unknown time  No Yes   Sig: Take 1 tablet (1 mg total) by mouth daily   gabapentin (NEURONTIN) 300 mg capsule Unknown at Unknown time  No No   Sig: Take 1 capsule (300 mg total) by mouth 3 (three) times a day   lisinopril (ZESTRIL) 10 mg tablet   No No   Sig: Take 1 tablet (10 mg total) by mouth daily   magnesium oxide (MAG-OX) 400 mg Unknown at Unknown time  No No   Sig: Take 1 tablet (400 mg total) by mouth daily   metoprolol tartrate (LOPRESSOR) 25 mg tablet 5/13/2021 at 0800  No Yes   Sig: Take 1 tablet (25 mg total) by mouth every 12 (twelve) hours   pravastatin (PRAVACHOL) 40 mg tablet   No No   Sig: Take 1 tablet (40 mg total) by mouth daily with dinner   ranolazine (RANEXA) 500 mg 12 hr tablet   No No   Sig: Take 1 tablet (500 mg total) by mouth every 12 (twelve) hours   tamsulosin (FLOMAX) 0 4 mg   Yes No   Sig: Take 0 4 mg by mouth daily   thiamine 100 MG tablet   No No   Sig: Take 1 tablet (100 mg total) by mouth daily      Facility-Administered Medications: None       Past Medical History:   Diagnosis Date    Cancer [facility])     prostate ca,had radiation    Cardiac disease     stents,then triple bypass    [facility] [facility]     Diabetes mellitus ([facility] )     ETOH abuse     Hx of heart artery stent     2014    Hyperlipidemia     Hypertension     Pneumonia     Prostate CA ([facility] [address] )     Renal disorder     pyelonephritis    S/P CABG x 1     2004       Past Surgical History:   Procedure Laterality Date    CORONARY ARTERY BYPASS GRAFT  2004    CO ARTHRODESIS ANT INTERBODY MIN DISCECTOMY, CERVICAL BELOW C2 N/A 12/16/2020    Procedure: Anterior cervical discectomy with fusion C4-C7;  Posterior cervical decompression and fusion C2-T2; Surgeon: Gina Coon MD;  [facility]: [facility];  Service: Neurosurgery    TONSILLECTOMY         Family History   Problem Relation Age of Onset    Diabetes Mother     Uterine cancer Mother     COPD Father     Hypertension Father      I have reviewed and agree with the history as documented  E-Cigarette/Vaping    E-Cigarette Use Never User      E-Cigarette/Vaping Substances     Social History     Tobacco Use    Smoking status: Current Every Day Smoker     Packs/day: 1 50     Years: 40 00     Pack years: 60 00    Smokeless tobacco: Never Used   Substance Use Topics    Alcohol use: Yes     Alcohol/week: 10 0 standard drinks     Types: 10 Shots of liquor per week     Frequency: 4 or more times a week     Drinks per session: 10 or more     Binge frequency: Daily or almost daily     Comment: drinking a 5th of vodka daily    Drug use: No       Review of Systems   Constitutional: Negative for chills and fever  HENT: Negative for facial swelling and trouble swallowing  Eyes: Negative for photophobia, pain and redness  Respiratory: Negative for cough, shortness of breath and wheezing  Cardiovascular: Positive for chest pain  Negative for palpitations  Gastrointestinal: Negative for abdominal pain, constipation, diarrhea, nausea and vomiting  Genitourinary: Negative for dysuria, flank pain, hematuria and urgency  Musculoskeletal: Negative for back pain  Skin: Negative for color change and rash  Psychiatric/Behavioral: Negative for agitation and confusion  All other systems reviewed and are negative  Physical Exam  Physical Exam  Vitals signs and nursing note reviewed  Constitutional:       Appearance: He is well-developed  HENT:      Head: Normocephalic and atraumatic  Eyes:      Pupils: Pupils are equal, round, and reactive to light  Cardiovascular:      Rate and Rhythm: Normal rate and regular rhythm  Heart sounds: Normal heart sounds     Pulmonary:      Effort: Pulmonary effort is normal       Breath sounds: Normal breath sounds  Abdominal:      General: Bowel sounds are normal  There is no distension  Palpations: Abdomen is soft  There is no mass  Tenderness: There is no abdominal tenderness  There is no guarding or rebound  Musculoskeletal:      Right lower leg: No edema  Left lower leg: No edema  Skin:     General: Skin is warm and dry  Capillary Refill: Capillary refill takes less than 2 seconds  Neurological:      Mental Status: He is alert and oriented to person, place, and time  Motor: Tremor present  Comments: Fine tremors noted to b/l upper extremities   Psychiatric:         Mood and Affect: Mood is anxious  Behavior: Behavior normal          Thought Content:  Thought content normal          Judgment: Judgment normal          Vital Signs  ED Triage Vitals [05/13/21 1419]   Temperature Pulse Respirations Blood Pressure SpO2   97 9 °F (36 6 °C) (!) 112 20 106/56 97 %      Temp Source Heart Rate Source Patient Position - Orthostatic VS BP Location FiO2 (%)   Tympanic Monitor Sitting Left arm --      Pain Score       4           Vitals:    05/13/21 1419 05/13/21 1526   BP: 106/56 144/71   Pulse: (!) 112 94   Patient Position - Orthostatic VS: Sitting Lying         Visual Acuity      ED Medications  Medications   magnesium sulfate 2 g/50 mL IVPB (premix) 2 g (has no administration in time range)   LORazepam (ATIVAN) injection 2 mg (has no administration in time range)   iohexol (OMNIPAQUE) 350 MG/ML injection (SINGLE-DOSE) 85 mL (85 mL Intravenous Given 5/13/21 1704)       Diagnostic Studies  Results Reviewed     Procedure Component Value Units Date/Time    Magnesium [[id number]]  (Abnormal) Collected: 05/13/21 1547    Lab Status: Final result Specimen: Blood from Arm, Left Updated: 05/13/21 1736     Magnesium 0 9 mg/dL     Rapid drug screen, urine [[id number]]  (Normal) Collected: 05/13/21 1652    Lab Status: Final result Specimen: Urine, Clean Catch Updated: 05/13/21 1729     Amph/Meth UR Negative     Barbiturate Ur Negative     Benzodiazepine Urine Negative     Cocaine Urine Negative     Methadone Urine Negative     Opiate Urine Negative     PCP Ur Negative     THC Urine Negative     Oxycodone Urine Negative    Narrative:      FOR MEDICAL PURPOSES ONLY  IF CONFIRMATION NEEDED PLEASE CONTACT THE LAB WITHIN 5 DAYS      Drug Screen Cutoff Levels:  AMPHETAMINE/METHAMPHETAMINES  1000 ng/mL  BARBITURATES     200 ng/mL  BENZODIAZEPINES     200 ng/mL  COCAINE      300 ng/mL  METHADONE      300 ng/mL  OPIATES      300 ng/mL  PHENCYCLIDINE     25 ng/mL  THC       50 ng/mL  OXYCODONE      100 ng/mL    Urine Microscopic [[id number]]  (Abnormal) Collected: 05/13/21 1652    Lab Status: Final result Specimen: Urine, Clean Catch Updated: 05/13/21 1720     RBC, UA 2-4 /hpf      WBC, UA 2-4 /hpf      Epithelial Cells Occasional /hpf      Bacteria, UA Occasional /hpf      Hyaline Casts, UA 10-20 /lpf     UA (URINE) with reflex to Scope [[id number]]  (Abnormal) Collected: 05/13/21 1652    Lab Status: Final result Specimen: Urine, Clean Catch Updated: 05/13/21 1713     Color, UA Yellow     Clarity, UA Clear     Specific Gravity, UA 1 025     pH, UA 5 5     Leukocytes, UA Negative     Nitrite, UA Negative     Protein, UA Trace mg/dl      Glucose, UA Negative mg/dl      Ketones, UA Trace mg/dl      Urobilinogen, UA 0 2 E U /dl      Bilirubin, UA Negative     Blood, UA Trace-Intact    Comprehensive metabolic panel [[id number]]  (Abnormal) Collected: 05/13/21 1547    Lab Status: Final result Specimen: Blood from Arm, Left Updated: 05/13/21 1609     Sodium 140 mmol/L      Potassium 4 9 mmol/L      Chloride 100 mmol/L      CO2 23 mmol/L      ANION GAP 17 mmol/L      BUN 15 mg/dL      Creatinine 1 42 mg/dL      Glucose 80 mg/dL      Calcium 8 4 mg/dL      Corrected Calcium 9 0 mg/dL       U/L      ALT 56 U/L      Alkaline Phosphatase 233 U/L      Total Protein 7 6 g/dL      Albumin 3 2 g/dL      Total Bilirubin 0 56 mg/dL      eGFR 54 ml/min/1 73sq m     Narrative:      [place] guidelines for Chronic Kidney Disease (CKD):     Stage 1 with normal or high GFR (GFR > 90 mL/min/1 73 square meters)    Stage 2 Mild CKD (GFR = 60-89 mL/min/1 73 square meters)    Stage 3A Moderate CKD (GFR = 45-59 mL/min/1 73 square meters)    Stage 3B Moderate CKD (GFR = 30-44 mL/min/1 73 square meters)    Stage 4 Severe CKD (GFR = 15-29 mL/min/1 73 square meters)    Stage 5 End Stage CKD (GFR <15 mL/min/1 73 square meters)  Note: GFR calculation is accurate only with a steady state creatinine    Troponin I [[id number]]  (Normal) Collected: 05/13/21 1514    Lab Status: Final result Specimen: Blood from Arm, Left Updated: 05/13/21 1542     Troponin I <0 02 ng/mL     Ethanol [[id number]]  (Abnormal) Collected: 05/13/21 1514    Lab Status: Final result Specimen: Blood from Arm, Left Updated: 05/13/21 1534     Ethanol Lvl 113 mg/dL     CBC and differential [[id number]]  (Abnormal) Collected: 05/13/21 1514    Lab Status: Final result Specimen: Blood from Arm, Left Updated: 05/13/21 1523     WBC 9 11 Thousand/uL      RBC 3 36 Million/uL      Hemoglobin 11 1 g/dL      Hematocrit 34 0 %       fL      MCH 33 0 pg      MCHC 32 6 g/dL      RDW 15 3 %      MPV 10 5 fL      Platelets 831 Thousands/uL      nRBC 0 /100 WBCs      Neutrophils Relative 86 %      Immat GRANS % 0 %      Lymphocytes Relative 7 %      Monocytes Relative 5 %      Eosinophils Relative 0 %      Basophils Relative 2 %      Neutrophils Absolute 7 76 Thousands/µL      Immature Grans Absolute 0 03 Thousand/uL      Lymphocytes Absolute 0 66 Thousands/µL      Monocytes Absolute 0 49 Thousand/µL      Eosinophils Absolute 0 01 Thousand/µL      Basophils Absolute 0 16 Thousands/µL                  CTA head and neck with and without contrast   Final Result by John Vega MD (05/13 1752)      1    No acute angiographic abnormality   2  Moderate bilateral cardiopulmonary disease   3  Findings concerning for 3 mm right anterior communicating artery artery aneurysm                     Workstation performed: [id number]         XR chest 1 view portable   Final Result by Daniel Castillo MD (05/13 1635)      No acute cardiopulmonary disease  Workstation performed: [id number]                    Procedures  Procedures         ED Course             HEART Risk Score      Most Recent Value   Heart Score Risk Calculator   History  0 Filed at: 05/13/2021 1739   ECG  0 Filed at: 05/13/2021 1739   Age  1 Filed at: 05/13/2021 1739   Risk Factors  1 Filed at: 05/13/2021 1739   Troponin  0 Filed at: 05/13/2021 1739   HEART Score  2 Filed at: 05/13/2021 1739                                    MDM  Number of Diagnoses or Management Options  Alcohol withdrawal ([facility] [address] ): new and requires workup  Anterior communicating artery aneurysm:   Chest pain, unspecified: new and requires workup  Generalized weakness: new and requires workup  Hypomagnesemia: new and requires workup  Diagnosis management comments: Patient ER with complaint of generalized weakness  Labs reviewed  Patient's magnesium is 0 9  Will replete  Will admit patient to [facility] for symptomatic hypomagnesemia  Patient is stable at the time of dispo         Amount and/or Complexity of Data Reviewed  Clinical lab tests: ordered and reviewed  Tests in the radiology section of CPT®: ordered and reviewed    Risk of Complications, Morbidity, and/or Mortality  Presenting problems: high  Diagnostic procedures: high  Management options: high    Patient Progress  Patient progress: stable      Disposition  Final diagnoses:   Hypomagnesemia   Generalized weakness   Chest pain, unspecified   Alcohol withdrawal ([facility] [address] )   Anterior communicating artery aneurysm     Time reflects when diagnosis was documented in both MDM as applicable and the Disposition within this note     Time User Action Codes Description Comment    5/13/2021  5:40 PM Kip Ephraim Add [E83 42] Hypomagnesemia     5/13/2021  5:40 PM Kip Ephraim O Add [R53 1] Generalized weakness     5/13/2021  5:40 PM Kip Ephraim O Add [R07 9] Chest pain, unspecified     5/13/2021  5:43 PM Kip Ephraim O Add [F10 239] Alcohol withdrawal ([facility] [address] )     5/13/2021  5:54 PM Kip Ephraim Add [I67 1] Anterior communicating artery aneurysm       ED Disposition     ED Disposition Condition Date/Time Comment    Admit Stable Thu May 13, 2021  5:40 PM Case was discussed with Dr Esther Lockhart and the patient's admission status was agreed to be Admission Status: observation status to the service of Dr Esther Lockhart   Follow-up Information    None         Patient's Medications   Discharge Prescriptions    No medications on file     No discharge procedures on file      PDMP Review       Value Time User    PDMP Reviewed  Yes 12/21/2020  3:08 PM Marcia Dawson PA-C          ED Provider  Electronically Signed by           Pooja Lewis DO  05/13/21 4467

## 2021-05-14 ENCOUNTER — IMMUNIZATIONS (OUTPATIENT)
Dept: FAMILY MEDICINE CLINIC | Facility: HOSPITAL | Age: 61
End: 2021-05-14
Payer: MEDICARE

## 2021-05-14 VITALS
HEART RATE: 99 BPM | TEMPERATURE: 98.8 F | BODY MASS INDEX: 19.32 KG/M2 | WEIGHT: 150.57 LBS | DIASTOLIC BLOOD PRESSURE: 79 MMHG | RESPIRATION RATE: 18 BRPM | SYSTOLIC BLOOD PRESSURE: 137 MMHG | OXYGEN SATURATION: 99 % | HEIGHT: 74 IN

## 2021-05-14 DIAGNOSIS — Z23 ENCOUNTER FOR IMMUNIZATION: Primary | ICD-10-CM

## 2021-05-14 PROBLEM — A04.72 CLOSTRIDIUM DIFFICILE DIARRHEA: Status: ACTIVE | Noted: 2021-05-14

## 2021-05-14 PROBLEM — B35.1 ONYCHOMYCOSIS OF LEFT GREAT TOE: Status: ACTIVE | Noted: 2021-05-14

## 2021-05-14 LAB
ALBUMIN SERPL BCP-MCNC: 2.7 G/DL (ref 3.5–5)
ALP SERPL-CCNC: 193 U/L (ref 46–116)
ALT SERPL W P-5'-P-CCNC: 37 U/L (ref 12–78)
ANION GAP SERPL CALCULATED.3IONS-SCNC: 10 MMOL/L (ref 4–13)
AST SERPL W P-5'-P-CCNC: 61 U/L (ref 5–45)
BILIRUB SERPL-MCNC: 0.5 MG/DL (ref 0.2–1)
BUN SERPL-MCNC: 15 MG/DL (ref 5–25)
CALCIUM ALBUM COR SERPL-MCNC: 8.9 MG/DL (ref 8.3–10.1)
CALCIUM SERPL-MCNC: 7.9 MG/DL (ref 8.3–10.1)
CHLORIDE SERPL-SCNC: 101 MMOL/L (ref 100–108)
CO2 SERPL-SCNC: 27 MMOL/L (ref 21–32)
CREAT SERPL-MCNC: 1.24 MG/DL (ref 0.6–1.3)
GFR SERPL CREATININE-BSD FRML MDRD: 63 ML/MIN/1.73SQ M
GLUCOSE P FAST SERPL-MCNC: 95 MG/DL (ref 65–99)
GLUCOSE SERPL-MCNC: 95 MG/DL (ref 65–140)
MAGNESIUM SERPL-MCNC: 2.7 MG/DL (ref 1.6–2.6)
PHOSPHATE SERPL-MCNC: 2.9 MG/DL (ref 2.7–4.5)
POTASSIUM SERPL-SCNC: 3.6 MMOL/L (ref 3.5–5.3)
PROT SERPL-MCNC: 6.6 G/DL (ref 6.4–8.2)
SODIUM SERPL-SCNC: 138 MMOL/L (ref 136–145)

## 2021-05-14 PROCEDURE — 91301 SARS-COV-2 / COVID-19 MRNA VACCINE (MODERNA) 100 MCG: CPT

## 2021-05-14 PROCEDURE — 0011A SARS-COV-2 / COVID-19 MRNA VACCINE (MODERNA) 100 MCG: CPT

## 2021-05-14 PROCEDURE — 84100 ASSAY OF PHOSPHORUS: CPT | Performed by: STUDENT IN AN ORGANIZED HEALTH CARE EDUCATION/TRAINING PROGRAM

## 2021-05-14 PROCEDURE — 99219 PR INITIAL OBSERVATION CARE/DAY 50 MINUTES: CPT | Performed by: FAMILY MEDICINE

## 2021-05-14 PROCEDURE — NC001 PR NO CHARGE: Performed by: FAMILY MEDICINE

## 2021-05-14 PROCEDURE — 97110 THERAPEUTIC EXERCISES: CPT

## 2021-05-14 PROCEDURE — 80053 COMPREHEN METABOLIC PANEL: CPT | Performed by: STUDENT IN AN ORGANIZED HEALTH CARE EDUCATION/TRAINING PROGRAM

## 2021-05-14 PROCEDURE — 83735 ASSAY OF MAGNESIUM: CPT | Performed by: STUDENT IN AN ORGANIZED HEALTH CARE EDUCATION/TRAINING PROGRAM

## 2021-05-14 PROCEDURE — 94760 N-INVAS EAR/PLS OXIMETRY 1: CPT

## 2021-05-14 PROCEDURE — 97163 PT EVAL HIGH COMPLEX 45 MIN: CPT

## 2021-05-14 RX ORDER — MAGNESIUM SULFATE HEPTAHYDRATE 40 MG/ML
2 INJECTION, SOLUTION INTRAVENOUS
Status: COMPLETED | OUTPATIENT
Start: 2021-05-14 | End: 2021-05-14

## 2021-05-14 RX ORDER — SACCHAROMYCES BOULARDII 250 MG
250 CAPSULE ORAL 2 TIMES DAILY
Qty: 20 CAPSULE | Refills: 0 | Status: SHIPPED | OUTPATIENT
Start: 2021-05-14 | End: 2021-05-24

## 2021-05-14 RX ORDER — POTASSIUM CHLORIDE 20 MEQ/1
40 TABLET, EXTENDED RELEASE ORAL ONCE
Status: COMPLETED | OUTPATIENT
Start: 2021-05-14 | End: 2021-05-14

## 2021-05-14 RX ORDER — VANCOMYCIN HYDROCHLORIDE 125 MG/1
125 CAPSULE ORAL 4 TIMES DAILY
Qty: 40 CAPSULE | Refills: 0 | Status: SHIPPED | OUTPATIENT
Start: 2021-05-14 | End: 2021-05-24

## 2021-05-14 RX ADMIN — MAGNESIUM OXIDE TAB 400 MG (241.3 MG ELEMENTAL MG) 400 MG: 400 (241.3 MG) TAB at 08:15

## 2021-05-14 RX ADMIN — GABAPENTIN 300 MG: 300 CAPSULE ORAL at 15:08

## 2021-05-14 RX ADMIN — FLUTICASONE FUROATE AND VILANTEROL TRIFENATATE 1 PUFF: 200; 25 POWDER RESPIRATORY (INHALATION) at 10:35

## 2021-05-14 RX ADMIN — METOCLOPRAMIDE 10 MG: 10 TABLET ORAL at 08:15

## 2021-05-14 RX ADMIN — RANOLAZINE 500 MG: 500 TABLET, FILM COATED, EXTENDED RELEASE ORAL at 08:15

## 2021-05-14 RX ADMIN — MAGNESIUM SULFATE HEPTAHYDRATE 2 G: 40 INJECTION, SOLUTION INTRAVENOUS at 01:45

## 2021-05-14 RX ADMIN — ENOXAPARIN SODIUM 40 MG: 40 INJECTION SUBCUTANEOUS at 08:16

## 2021-05-14 RX ADMIN — Medication 1 TABLET: at 08:14

## 2021-05-14 RX ADMIN — METOPROLOL TARTRATE 25 MG: 25 TABLET, FILM COATED ORAL at 08:15

## 2021-05-14 RX ADMIN — MAGNESIUM SULFATE HEPTAHYDRATE 2 G: 40 INJECTION, SOLUTION INTRAVENOUS at 03:57

## 2021-05-14 RX ADMIN — AMLODIPINE BESYLATE 5 MG: 5 TABLET ORAL at 08:15

## 2021-05-14 RX ADMIN — POTASSIUM CHLORIDE 40 MEQ: 1500 TABLET, EXTENDED RELEASE ORAL at 08:15

## 2021-05-14 RX ADMIN — THIAMINE HCL TAB 100 MG 100 MG: 100 TAB at 08:14

## 2021-05-14 RX ADMIN — LISINOPRIL 10 MG: 10 TABLET ORAL at 08:15

## 2021-05-14 RX ADMIN — METOCLOPRAMIDE 10 MG: 10 TABLET ORAL at 10:47

## 2021-05-14 RX ADMIN — NICOTINE 1 PATCH: 21 PATCH, EXTENDED RELEASE TRANSDERMAL at 08:16

## 2021-05-14 RX ADMIN — GABAPENTIN 300 MG: 300 CAPSULE ORAL at 08:14

## 2021-05-14 RX ADMIN — METOCLOPRAMIDE 10 MG: 10 TABLET ORAL at 15:08

## 2021-05-14 RX ADMIN — FOLIC ACID 1 MG: 1 TABLET ORAL at 08:15

## 2021-05-14 RX ADMIN — TAMSULOSIN HYDROCHLORIDE 0.4 MG: 0.4 CAPSULE ORAL at 08:14

## 2021-05-14 NOTE — CASE MANAGEMENT
LOS: 1 DAY  UNPLANNED RA RISK SCORE: N/A (OBS)  RA: NO  BUNDLE: NO    CM SCREENED  CM met with patient and discussed dc planning and the role of CM  [place]  There are no steps  He owns a RW which he uses sometimes to ambulate  He also has a nebulizer and a SC which he does not use  He has medication coverage and uses the [facility] in [place]  His PCP is Dr Cheney Going  No POA or LW and is not interested  Patient was interested in D&A information and crisis referred  CM will need to set up [employer] transport home

## 2021-05-14 NOTE — DISCHARGE INSTRUCTIONS
Abuse of Alcohol   WHAT YOU NEED TO KNOW:   Alcohol abuse means you drink more than the recommended daily or weekly limits  You may be drinking alcohol regularly or drinking large amounts in a short period of time (binge drinking)  You continue to drink even though it causes legal, work, or relationship problems  DISCHARGE INSTRUCTIONS:   Call your local emergency number (911 in the [address]) for any of the following:   · You have sudden chest pain or trouble breathing  · You want to harm yourself or others  · You have a seizure or have shaking or trembling  Call your doctor if:   · You have hallucinations (you see or hear things that are not real)  · You cannot stop vomiting or you vomit blood  · You need help to stop drinking alcohol  · You have questions or concerns about your condition or care  Medicines:   · Vitamin supplements  may be given to treat low vitamin levels  Alcohol can make it hard for your body to absorb enough vitamins such as B1  Vitamin supplements may also be given to prevent alcohol related brain damage  · Take your medicine as directed  Contact your healthcare provider if you think your medicine is not helping or if you have side effects  Tell him or her if you are allergic to any medicine  Keep a list of the medicines, vitamins, and herbs you take  Include the amounts, and when and why you take them  Bring the list or the pill bottles to follow-up visits  Carry your medicine list with you in case of an emergency  Recommended alcohol limits:   · Men 21 to 64 years  should limit alcohol to 2 drinks a day  Do not have more than 4 drinks in 1 day or more than 14 in 1 week  · All women, and men 72 or older  should limit alcohol to 1 drink in a day  Do not have more than 3 drinks in 1 day or more than 7 in 1 week  No amount of alcohol is okay during pregnancy      Health problems alcohol abuse can cause:   · Cancer in your liver, pancreas, stomach, colon, kidney, or breast    · Stroke or a heart attack    · Liver, kidney, or lung disease    · Blackouts, memory loss, brain damage, or dementia    · Diabetes, immune system problems, or thiamine (vitamin B1) deficiency    · Problems for you and your baby if you drink while pregnant    Manage alcohol use:   · Decrease the amount you drink  This can help prevent health problems such as brain, heart, and liver damage, high blood pressure, diabetes, and cancer  If you cannot stop completely, healthcare providers can help you set goals to decrease the amount you drink  · Plan weekly alcohol use  You will be less likely to drink more than the recommended limit if you plan ahead  · Have food when you drink alcohol  Food will prevent alcohol from getting into your system too quickly  Eat before you have your first alcohol drink  · Time your drinks carefully  Have no more than 1 drink in an hour  Have a liquid such as water, coffee, or a soft drink between alcohol drinks  · Do not drive if you have had alcohol  Make sure someone who has not been drinking can help you get home  · Do not drink alcohol if you are taking medicine  Alcohol is dangerous when you combine it with certain medicines, such as acetaminophen or blood pressure medicine  Talk to your healthcare provider about all the medicines you currently take  Follow up with your healthcare provider as directed:  Write down your questions so you remember to ask them during your visits  For support and more information:   · Alcoholics Anonymous  Web Address: http://www [employer]/    · Substance Abuse and [address] [place]  Web Address: https://drop.io/  © [address] Information is for End User's use only and may not be sold, redistributed or otherwise used for commercial purposes   All illustrations and images included in CareNotes® are the copyrighted property of [employer]  or [address]  The above information is an  only  It is not intended as medical advice for individual conditions or treatments  Talk to your doctor, nurse or pharmacist before following any medical regimen to see if it is safe and effective for you

## 2021-05-14 NOTE — ASSESSMENT & PLAN NOTE
Patient given 2 mg Ativan IV in ED, patient tachycardic and mildly anxious  · CIWA protocol  · Ativan 0 5 mg q 8h, additional 0 5 mg q 6 p r n  · Patient tolerating diet well, administer 1/2L LR to improve hydration  · Continue home thiamine, folate  · Add multivitamin and increase home magnesium to 400 b i d    · Zofran 4mg PRN

## 2021-05-14 NOTE — DISCHARGE SUMMARY
[facility] Discharge Summary - Medical Laverne Christine 63 y o  male MRN: [id number]    [place] 47  Room / Bed: [address] Encounter: [id number]    BRIEF OVERVIEW  Admitting Provider: Constance Simon MD  Discharge Provider: Constance Simon MD    Discharge To: Home    Outpatient Follow-Up: [facility]    Things to address at first follow up visit:   · Did you complete 10 day vancomycin 125 mg with Florastor b i d  Course? · IU taking magnesium 400 mg b i d  Instead of once daily? · Assess alcohol consumption and readiness to stop  · Referral was placed to Case Management to establish care with mental health and substance abuse counseling  · Is patient going to physical therapy? · Does patient have 2nd COVID vaccine scheduled? Received 1st dose on the floor  Labs and results pending at discharge: None    Admission Date: 5/13/2021     Discharge Date: 05/14/21     Primary Discharge Diagnosis  Principal Problem:    Hypomagnesemia  Active Problems:    History of Atrial fibrillation (HCC)    Diastolic heart failure (HCC)    Diarrhea    Cervical spinal stenosis    Alcohol abuse    Nicotine abuse    Essential hypertension    Type 2 diabetes mellitus (HCC)    COPD (chronic obstructive pulmonary disease) (HCC)    Alcohol withdrawal syndrome without complication (HCC)    Hx of CABG    History of prostate cancer    Hepatitis C antibody test positive    Imbalanced nutrition    Onychomycosis of left great toe    Clostridium difficile diarrhea  Resolved Problems:    * No resolved hospital problems   *    DETAILS OF HOSPITAL STAY    Procedures Performed/Pertinent Test results  5/14:  K 3 6, CR 1 24, AST 61, , mg 2 7, phos 2 9  5/13 Mag 0 9-> 1 1 ETOH 113 UDS(-) CR 1 42 (new BL) trop <0 02,  ALT 56  Alb 3 2 HGB 11 1  EKG sinus tachy  CTA head neck:  NAD, cervical hardware s/p spinal fusion    HPI (copied from H&P)  79-year-old male with PMH alcohol abuse, CAD with CABG x3 with stents x2, COPD with extensive/current smoking history, well controlled diabetes, history of prostate cancer, spinal stenosis with fall and central cord syndrome 5 months s/p cervical spinal fusion presents to hospital due to feeling dizzy/lightheaded  Patient reports for the last 4 days including last night at 7:00 p m , he has been drinking a 5th of vodka daily  Patient then went to sleep at 9:00 p m  Waking at 7:00 a m  He reports feeling dizzy/lightheaded upon waking and rolled/fell out of bed  Patient states he hit his head but denies hurting has head  He reports posterior neck tenderness since cervical spinal fusion  Patient experienced associated chest pain with his lightheadedness, however he has had resolved upon eating prior to/during my evaluation  Patient reports he feels his diet is poor however he enjoys the food that he eats:  Pork chops, cocktail fruits, cereals  Patient reports multiple life stresses that have contributed to his drinking habits:  Raising 2 children and later finding out he was not their father, and their mother leaving him for their father  He subsequently suffered the loss of 1 of those 2 children to a car accident  He does not report any therapist or individuals in his life with whom he discusses and processes these life stresses    ED course:  2 g IV magnesium, 2 g IV Ativan    Hospital Course  Patient was admitted for hypomagnesemia with magnesium level 0 9  Received total 6 g IV magnesium overnight after which magnesium increased to 1 1 and then 2 7 prior to discharge  Patient has significant alcohol dependence and reported drinking a 5th of vodka daily  He was placed on CIWA protocol  He received 2mg IV Ativan in ED who and then 0 5 mg IV Ativan a few hours later per schedule course  Patient did not show any signs or symptoms of alcohol withdrawal and did not require additional Ativan per CIWA protocol      Patient remained hemodynamically and clinically stable and was discharged      Physical Exam at Discharge  /79 (BP Location: Right arm)   Pulse 99   Temp 98 8 °F (37 1 °C) (Oral)   Resp 18   Ht 6' 2" (1 88 m)   Wt 68 3 kg (150 lb 9 2 oz)   SpO2 99%   BMI 19 33 kg/m²   General appearance: alert and oriented, in no acute distress  Head: Normocephalic, without obvious abnormality, atraumatic  Lungs: clear to auscultation bilaterally  Heart: regular rate and rhythm and S1, S2 normal  Abdomen: soft, non-tender; bowel sounds normal; no masses,  no organomegaly  Extremities: extremities normal, warm and well-perfused; no cyanosis, clubbing, or edema    Medications   Discharge Medication List as of 5/14/2021  1:44 PM      CONTINUE these medications which have CHANGED    Details   magnesium oxide (MAG-OX) 400 mg Take 1 tablet (400 mg total) by mouth 2 (two) times a day, Starting Fri 5/14/2021, Normal             Discharge Medication List as of 5/14/2021  1:44 PM      CONTINUE these medications which have NOT CHANGED    Details   albuterol (PROVENTIL HFA,VENTOLIN HFA) 90 mcg/act inhaler Inhale 2 puffs every 4 (four) hours as needed for wheezing, Starting Fri 10/12/2018, Print      amLODIPine (NORVASC) 5 mg tablet Take 1 tablet (5 mg total) by mouth daily, Starting Tue 12/22/2020, Normal      Blood Glucose Monitoring Suppl (ONE TOUCH ULTRA MINI) w/Device KIT Use as directed, Starting u 5/16/2019, Historical Med      fluticasone-salmeterol (ADVAIR, WIXELA) 500-50 mcg/dose inhaler Inhale 1 puff every 12 (twelve) hours, Starting Mon 2/88/0858, Normal      folic acid (FOLVITE) 1 mg tablet Take 1 tablet (1 mg total) by mouth daily, Starting Tue 12/8/2020, Normal      gabapentin (NEURONTIN) 300 mg capsule Take 1 capsule (300 mg total) by mouth 3 (three) times a day, Starting Mon 4/12/2021, Normal      lisinopril (ZESTRIL) 10 mg tablet Take 1 tablet (10 mg total) by mouth daily, Starting Mon 5/3/2021, Normal      metoprolol tartrate (LOPRESSOR) 25 mg tablet Take 1 tablet (25 mg total) by mouth every 12 (twelve) hours, Starting Fri 2/19/2021, Normal      ONETOUCH DELICA LANCETS FINE MISC 3 (three) times a day Test, Starting Thu 5/16/2019, Historical Med      pravastatin (PRAVACHOL) 40 mg tablet Take 1 tablet (40 mg total) by mouth daily with dinner, Starting Fri 2/19/2021, Normal      ranolazine (RANEXA) 500 mg 12 hr tablet Take 1 tablet (500 mg total) by mouth every 12 (twelve) hours, Starting Mon 4/12/2021, Normal      tamsulosin (FLOMAX) 0 4 mg Take 0 4 mg by mouth daily, Starting Fri 9/11/2020, Historical Med      thiamine 100 MG tablet Take 1 tablet (100 mg total) by mouth daily, Starting Tue 12/8/2020, Normal            Discharge Medication List as of 5/14/2021  1:44 PM      START taking these medications    Details   saccharomyces boulardii (FLORASTOR) 250 mg capsule Take 1 capsule (250 mg total) by mouth 2 (two) times a day for 10 days, Starting Fri 5/14/2021, Until Mon 5/24/2021, Normal      vancomycin (VANCOCIN) 125 MG capsule Take 1 capsule (125 mg total) by mouth 4 (four) times a day for 10 days, Starting Fri 5/14/2021, Until Mon 5/24/2021, Normal            Discharge Medication List as of 5/14/2021  1:44 PM         Allergies  No Known Allergies    Diet restrictions: none  Activity restrictions: none  Code Status: Level 1 - Full Code    Discharge Condition: stable    Discharge  Statement   I spent 30 minutes discharging the patient  This time was spent on the day of discharge  I had direct contact with the patient on the day of discharge  Additional documentation is required if more than 30 minutes were spent on discharge

## 2021-05-14 NOTE — PHYSICAL THERAPY NOTE
PT EVALUATION     05/14/21 1200   Note Type   Note type Evaluation   Pain Assessment   Pain Assessment Tool Pain Assessment not indicated - pt denies pain   Home Living   Type of Home Apartment   Home Layout One level;Elevator   Home Equipment Walker   Additional Comments Patient using roller walker occasionally prior to admission   Prior Function   Level of [place] Independent with ADLs and functional mobility   Lives With Alone   Receives Help From Friend(s)   ADL Assistance Independent   IADLs Needs assistance   Comments Patient states neighbors and friends check in on him and help him with transportation and food shopping at times   Restrictions/Precautions   Other Precautions Chair Alarm; Bed Alarm; Fall Risk   General   Additional Pertinent History Chart reviewed, patient admitted with alcohol withdrawal    Patient with history cervical spinal fusion recently progressing off a walker and reports independent ADLs to admission  Patient now tolerating gait with roller walker will benefit from outpatient physical therapy   Family/Caregiver Present No   Cognition   Overall Cognitive Status WFL   Arousal/Participation Cooperative   Orientation Level Oriented to person;Oriented to place   RLE Assessment   RLE Assessment   (ROM WFL, strength 3+/ 4-)   LLE Assessment   LLE Assessment   (ROM WFL, strength 3+/ 4-)   Coordination   Movements are Fluid and Coordinated 0   Bed Mobility   Supine to Sit 7  Independent   Sit to Supine 7  Independent   Transfers   Sit to Stand 4  Minimal assistance   Additional items Assist x 1;Verbal cues   Stand to Sit 4  Minimal assistance   Additional items Assist x 1;Verbal cues   Ambulation/Elevation   Gait Assistance   (Min to mod assist)   Additional items Assist x 1;Verbal cues; Tactile cues   Assistive Device   (Handhold)   Distance 10 ft unsteady gait patterning loss of balance laterally and posteriorly at times   Balance   Static Sitting Fair +   Dynamic Sitting Fair Static Standing Fair   Dynamic Standing Fair -   Ambulatory Fair -   Activity Tolerance   Activity Tolerance Patient limited by fatigue;Patient tolerated treatment well   Nurse Made Aware yes   Assessment   Prognosis Good   Problem List Decreased strength;Decreased range of motion;Decreased endurance; Impaired balance;Decreased mobility; Decreased coordination   Assessment Patient seen for Physical Therapy evaluation  Patient admitted with Alcohol withdrawal syndrome without complication ([facility] [address] )  Comorbidities affecting patient's physical performance include:PMH CAD, COPD, cervical spinal fusion chronic alcohol abuse  Personal factors affecting patient at time of initial evaluation include: ambulating with assistive device, inability to ambulate household distances, inability to navigate community distances, inability to navigate level surfaces without external assistance, inability to perform dynamic tasks in community, limited home support, inability to perform physical activity, inability to perform ADLS and inability to perform IADLS   Prior to admission, patient was independent with functional mobility without assistive device, independent with ADLS, requiring assist for IADLS, ambulating household distance and ambulating community distances  Please find objective findings from Physical Therapy assessment regarding body systems outlined above with impairments and limitations including weakness, decreased ROM, impaired balance, decreased endurance, impaired coordination, gait deviations, decreased activity tolerance, decreased functional mobility tolerance and fall risk  The Barthel Index was used as a functional outcome tool presenting with a score of 55 today indicating marked limitations of functional mobility and ADLS    Patient's clinical presentation is currently unstable/unpredictable as seen in patient's presentation of vital sign response, changing level of pain, varying levels of cognitive performance, increased fall risk, new onset of impairment of functional mobility, decreased endurance and new onset of weakness  Pt would benefit from continued Physical Therapy treatment to address deficits as defined above and maximize level of functional mobility  As demonstrated by objective findings, the assigned level of complexity for this evaluation is high  The patient's [facility] Basic Mobility Inpatient Short Form Raw Score is 20, Standardized Score is 43 99  A standardized score greater than 42 9 suggests the patient may benefit from discharge to home  Please also refer to the recommendation of the Physical Therapist for safe discharge planning  Goals   Patient Goals To go home and get stronger   STG Expiration Date 05/21/21   Short Term Goal #1 Transfers and gait with roller walker independently   Short Term Goal #2 Gait endurance to functional household distances, strength bilateral lower extremities 4-/5 all to meet patient goal of returning home with improve strength   LTG Expiration Date 05/28/21   Long Term Goal #1 Gait endurance to functional community distances   Plan   Treatment/Interventions ADL retraining;Functional transfer training;LE strengthening/ROM; Therapeutic exercise; Endurance training;Patient/family training;Equipment eval/education; Bed mobility;Gait training; Compensatory technique education   PT Frequency 5x/wk   Recommendation   PT Discharge Recommendation Home with outpatient rehabilitation   Additional Comments Use of roller walker at all times   [facility] Basic Mobility Inpatient   Turning in Bed Without Bedrails 4   Lying on Back to Sitting on Edge of Flat Bed 4   Moving Bed to Chair 3   Standing Up From Chair 4   Walk in Room 3   Climb 3-5 Stairs 2   Basic Mobility Inpatient Raw Score 20   Basic Mobility Standardized Score 43 99   Barthel Index   Feeding 10   Bathing 0   Grooming Score 0   Dressing Score 10   Bladder Score 10   Bowels Score 10   Toilet Use Score 5   Transfers (Bed/Chair) Score 10   Mobility (Level Surface) Score 0   Stairs Score 0   Barthel Index Score 54   Licensure   [place] License Number  Rhea Ramires PT [id number]     PT TREATMENT  Time In:1045   Time Out:1100  Total Time: 15min      S:  Patient without pain complaints  O: -gait was roller walker with supervision independent, endurance 40 ft change in direction no balance loss noted  -education completed in use of roller walker at all times for fall prevention  -exercise completed extremity sitting bed edge:  Long arc quads, hip flexion, ankle pumps 10 reps  A:  Patient educated and benefits of use roller walker and outpatient physical therapy  P:  Outpatient PT    Dennis Sierra, PT

## 2021-05-14 NOTE — ASSESSMENT & PLAN NOTE
Patient received 2 g magnesium IV in ED  · Given critical level of 0 9, monitor closely for additional IV supplementation  · Increase home daily regimen of magnesium from 400 mg daily to 400 b i d

## 2021-05-14 NOTE — PLAN OF CARE
Problem: Potential for Falls  Goal: Patient will remain free of falls  Description: INTERVENTIONS:  - Assess patient frequently for physical needs  -  Identify cognitive and physical deficits and behaviors that affect risk of falls    -  [place] fall precautions as indicated by assessment   - Educate patient/family on patient safety including physical limitations  - Instruct patient to call for assistance with activity based on assessment  - Modify environment to reduce risk of injury  - Consider OT/PT consult to assist with strengthening/mobility  Outcome: Progressing

## 2021-05-14 NOTE — ASSESSMENT & PLAN NOTE
Lab Results   Component Value Date    HGBA1C 5 3 12/07/2020       No results for input(s): POCGLU in the last 72 hours      Blood Sugar Average: Last 72 hrs:     Sugar appears to be well controlled, continue to monitor

## 2021-05-14 NOTE — CASE MANAGEMENT
DESIREE requested [employer] transport in [place]  Requested they contact patients nurse [id number]  Her contact number was provided

## 2021-05-14 NOTE — NURSING NOTE
Dr Ivan Malone and residents requested covid vaccine to be administered to pt before his discharge   Moderna vaccine administered to pt on right deltoid
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
## 2021-05-14 NOTE — ASSESSMENT & PLAN NOTE
Patient is tachycardic however normal heart sounds appreciated and EKG revealed sinus tachycardia  · Monitor vitals  · Patient on metoprolol b i d

## 2021-05-14 NOTE — CASE MANAGEMENT
CM received consult for D&A information  CM spoke with patient and he is interested in speaking with Crisis  Cm called Crisis and left a message for follow up

## 2021-05-14 NOTE — ASSESSMENT & PLAN NOTE
Wt Readings from Last 3 Encounters:   05/13/21 68 3 kg (150 lb 9 2 oz)   04/25/21 84 kg (185 lb 3 oz)   04/12/21 82 7 kg (182 lb 4 8 oz)     EF 09-12%, Grade 1 diastolic failure 50/8746  · New home regimen metoprolol tartrate 25 b i d , lisinopril 10 daily

## 2021-05-16 LAB
ATRIAL RATE: 105 BPM
P AXIS: 68 DEGREES
PR INTERVAL: 114 MS
QRS AXIS: 78 DEGREES
QRSD INTERVAL: 76 MS
QT INTERVAL: 352 MS
QTC INTERVAL: 465 MS
T WAVE AXIS: 33 DEGREES
VENTRICULAR RATE: 105 BPM

## 2021-05-16 PROCEDURE — 93010 ELECTROCARDIOGRAM REPORT: CPT | Performed by: INTERNAL MEDICINE

## 2021-05-17 ENCOUNTER — TELEPHONE (OUTPATIENT)
Dept: FAMILY MEDICINE CLINIC | Facility: CLINIC | Age: 61
End: 2021-05-17

## 2021-05-17 ENCOUNTER — PATIENT OUTREACH (OUTPATIENT)
Dept: CASE MANAGEMENT | Facility: HOSPITAL | Age: 61
End: 2021-05-17

## 2021-05-17 ENCOUNTER — TRANSITIONAL CARE MANAGEMENT (OUTPATIENT)
Dept: FAMILY MEDICINE CLINIC | Facility: CLINIC | Age: 61
End: 2021-05-17

## 2021-05-17 NOTE — CASE MANAGEMENT
MERT spoke with RN Lisa Dawson who states patient was unable to  his Vanco prescription for Cdiff infection without prior authorization  MERT called [phone] to complete prior authorization  Auth approved  Auth number  [id number]    MERT called [facility] to inform of above  Pharmacy confirms cost of $3 70, prescription will be ready for  tomorrow however they will provide patient some today to last him until tomorrow

## 2021-05-17 NOTE — PROGRESS NOTES
This CM is covering embedded CM caseload while out of office  ADT notification received for patient discharge from hospital - admitting diagnosis of hypomagnesia also noted to be acutely intoxicated  Patient received 1st covid vaccine while inpatient  Medication changed per AVS:  START:  * FLORASTOR:  Take (1) 250 mg capsule by mouth 2 times a day for 10 days   * VANCOMYCIN:  Take (1) 125 mg capsule by mouth 4 times a day for 10 days   CHANGE:  *magnesium oxide (MAG-OX): Take (1) 400 mg tablet by mouth 2 times a day     Future appointments:  5/25  Nikki Colunga [facility]  6/14  [address] [facility] CAT Scan  6/16  [address] [place] [address] Pkwy Vaccine (2nd dose)  6/17   [facility]    Unsuccessful attempt at reaching patient  Left name with explanation of this CM covering for his CM while OoO  Detailed message left with above instruction per hospital AVS and the his future appointments  Call back number for Dottie Dodd included in message with direction to call his PCP for any urgent clinical matters  Email sent to embedded CM with update

## 2021-05-17 NOTE — TELEPHONE ENCOUNTER
Jono Samaniego,    I spoke with Shad Head and he states that he had previously discussed outpatient rehab with you but is now interested in inpatient rehab and will need transport  Can you reach out to him when you return?   thanks

## 2021-05-21 ENCOUNTER — PATIENT OUTREACH (OUTPATIENT)
Dept: FAMILY MEDICINE CLINIC | Facility: CLINIC | Age: 61
End: 2021-05-21

## 2021-05-21 NOTE — PROGRESS NOTES
Received phone call from patient  [place] number given to assist with finding detox programs  Pt states he is ready to start detox  Pt is also interested in changing pharmacies to [facility] [address]  This pharmacy was added to patients list  Pt is going to get blood work today  Pt is aware of his appointment on 5/25/ CM will meet with patient at that time  Pt will reach out to CM with any additional questions or concerns

## 2021-05-24 DIAGNOSIS — I10 ESSENTIAL (PRIMARY) HYPERTENSION: Chronic | ICD-10-CM

## 2021-05-24 DIAGNOSIS — J44.9 COPD (CHRONIC OBSTRUCTIVE PULMONARY DISEASE) (HCC): ICD-10-CM

## 2021-05-24 DIAGNOSIS — F10.10 ALCOHOL ABUSE: Chronic | ICD-10-CM

## 2021-05-24 DIAGNOSIS — R25.2 MUSCLE CRAMP: ICD-10-CM

## 2021-05-25 RX ORDER — FOLIC ACID 1 MG/1
TABLET ORAL
Qty: 90 TABLET | Refills: 2 | Status: SHIPPED | OUTPATIENT
Start: 2021-05-25 | End: 2022-06-09 | Stop reason: SDUPTHER

## 2021-05-25 RX ORDER — LANOLIN ALCOHOL/MO/W.PET/CERES
CREAM (GRAM) TOPICAL
Qty: 90 TABLET | Refills: 2 | Status: SHIPPED | OUTPATIENT
Start: 2021-05-25 | End: 2022-04-19 | Stop reason: SDUPTHER

## 2021-05-25 RX ORDER — AMLODIPINE BESYLATE 5 MG/1
TABLET ORAL
Qty: 90 TABLET | Refills: 2 | Status: SHIPPED | OUTPATIENT
Start: 2021-05-25 | End: 2022-06-09

## 2021-05-25 RX ORDER — PRAVASTATIN SODIUM 40 MG
TABLET ORAL
Qty: 90 TABLET | Refills: 2 | Status: SHIPPED | OUTPATIENT
Start: 2021-05-25 | End: 2022-06-09 | Stop reason: SDUPTHER

## 2021-06-02 ENCOUNTER — TELEPHONE (OUTPATIENT)
Dept: INPATIENT UNIT | Facility: HOSPITAL | Age: 61
End: 2021-06-02

## 2021-06-03 DIAGNOSIS — E11.00 TYPE 2 DIABETES MELLITUS WITH HYPEROSMOLARITY WITHOUT COMA, WITHOUT LONG-TERM CURRENT USE OF INSULIN (HCC): Primary | Chronic | ICD-10-CM

## 2021-06-04 ENCOUNTER — APPOINTMENT (EMERGENCY)
Dept: RADIOLOGY | Facility: HOSPITAL | Age: 61
End: 2021-06-04
Payer: MEDICARE

## 2021-06-04 ENCOUNTER — HOSPITAL ENCOUNTER (OUTPATIENT)
Facility: HOSPITAL | Age: 61
Setting detail: OBSERVATION
Discharge: HOME | End: 2021-06-05
Attending: EMERGENCY MEDICINE | Admitting: FAMILY MEDICINE
Payer: MEDICARE

## 2021-06-04 DIAGNOSIS — E83.42 HYPOMAGNESEMIA: ICD-10-CM

## 2021-06-04 DIAGNOSIS — Z95.1 HX OF CABG: Chronic | ICD-10-CM

## 2021-06-04 DIAGNOSIS — I25.10 CORONARY ARTERY DISEASE WITHOUT ANGINA PECTORIS, UNSPECIFIED VESSEL OR LESION TYPE, UNSPECIFIED WHETHER NATIVE OR TRANSPLANTED HEART: Chronic | ICD-10-CM

## 2021-06-04 DIAGNOSIS — R07.9 CHEST PAIN: Primary | ICD-10-CM

## 2021-06-04 LAB
ALBUMIN SERPL BCP-MCNC: 3.2 G/DL (ref 3.5–5)
ALBUMIN SERPL BCP-MCNC: 3.3 G/DL (ref 3.5–5)
ALP SERPL-CCNC: 122 U/L (ref 46–116)
ALP SERPL-CCNC: 124 U/L (ref 46–116)
ALT SERPL W P-5'-P-CCNC: 59 U/L (ref 12–78)
ALT SERPL W P-5'-P-CCNC: 60 U/L (ref 12–78)
ANION GAP SERPL CALCULATED.3IONS-SCNC: 11 MMOL/L (ref 4–13)
ANION GAP SERPL CALCULATED.3IONS-SCNC: 9 MMOL/L (ref 4–13)
APTT PPP: 29 SECONDS (ref 23–37)
APTT PPP: 29 SECONDS (ref 23–37)
AST SERPL W P-5'-P-CCNC: 142 U/L (ref 5–45)
AST SERPL W P-5'-P-CCNC: 156 U/L (ref 5–45)
BASOPHILS # BLD AUTO: 0.06 THOUSANDS/ΜL (ref 0–0.1)
BASOPHILS NFR BLD AUTO: 1 % (ref 0–1)
BILIRUB SERPL-MCNC: 0.46 MG/DL (ref 0.2–1)
BILIRUB SERPL-MCNC: 0.55 MG/DL (ref 0.2–1)
BUN SERPL-MCNC: 10 MG/DL (ref 5–25)
BUN SERPL-MCNC: 12 MG/DL (ref 5–25)
CALCIUM ALBUM COR SERPL-MCNC: 9.1 MG/DL (ref 8.3–10.1)
CALCIUM ALBUM COR SERPL-MCNC: 9.3 MG/DL (ref 8.3–10.1)
CALCIUM SERPL-MCNC: 8.5 MG/DL (ref 8.3–10.1)
CALCIUM SERPL-MCNC: 8.7 MG/DL (ref 8.3–10.1)
CHLORIDE SERPL-SCNC: 97 MMOL/L (ref 100–108)
CHLORIDE SERPL-SCNC: 97 MMOL/L (ref 100–108)
CO2 SERPL-SCNC: 25 MMOL/L (ref 21–32)
CO2 SERPL-SCNC: 26 MMOL/L (ref 21–32)
CREAT SERPL-MCNC: 1.12 MG/DL (ref 0.6–1.3)
CREAT SERPL-MCNC: 1.13 MG/DL (ref 0.6–1.3)
EOSINOPHIL # BLD AUTO: 0.13 THOUSAND/ΜL (ref 0–0.61)
EOSINOPHIL NFR BLD AUTO: 3 % (ref 0–6)
ERYTHROCYTE [DISTWIDTH] IN BLOOD BY AUTOMATED COUNT: 14.7 % (ref 11.6–15.1)
GFR SERPL CREATININE-BSD FRML MDRD: 71 ML/MIN/1.73SQ M
GFR SERPL CREATININE-BSD FRML MDRD: 72 ML/MIN/1.73SQ M
GLUCOSE SERPL-MCNC: 115 MG/DL (ref 65–140)
GLUCOSE SERPL-MCNC: 124 MG/DL (ref 65–140)
GLUCOSE SERPL-MCNC: 95 MG/DL (ref 65–140)
HCT VFR BLD AUTO: 32.1 % (ref 36.5–49.3)
HGB BLD-MCNC: 11 G/DL (ref 12–17)
IMM GRANULOCYTES # BLD AUTO: 0.02 THOUSAND/UL (ref 0–0.2)
IMM GRANULOCYTES NFR BLD AUTO: 0 % (ref 0–2)
INR PPP: 0.95 (ref 0.84–1.19)
INR PPP: 0.97 (ref 0.84–1.19)
LYMPHOCYTES # BLD AUTO: 1.02 THOUSANDS/ΜL (ref 0.6–4.47)
LYMPHOCYTES NFR BLD AUTO: 22 % (ref 14–44)
MAGNESIUM SERPL-MCNC: 1.5 MG/DL (ref 1.6–2.6)
MAGNESIUM SERPL-MCNC: 2.4 MG/DL (ref 1.6–2.6)
MCH RBC QN AUTO: 33.6 PG (ref 26.8–34.3)
MCHC RBC AUTO-ENTMCNC: 34.3 G/DL (ref 31.4–37.4)
MCV RBC AUTO: 98 FL (ref 82–98)
MONOCYTES # BLD AUTO: 0.56 THOUSAND/ΜL (ref 0.17–1.22)
MONOCYTES NFR BLD AUTO: 12 % (ref 4–12)
NEUTROPHILS # BLD AUTO: 2.79 THOUSANDS/ΜL (ref 1.85–7.62)
NEUTS SEG NFR BLD AUTO: 62 % (ref 43–75)
NRBC BLD AUTO-RTO: 0 /100 WBCS
PLATELET # BLD AUTO: 84 THOUSANDS/UL (ref 149–390)
PLATELET # BLD AUTO: 91 THOUSANDS/UL (ref 149–390)
PMV BLD AUTO: 10.5 FL (ref 8.9–12.7)
PMV BLD AUTO: 11.2 FL (ref 8.9–12.7)
POTASSIUM SERPL-SCNC: 4.5 MMOL/L (ref 3.5–5.3)
POTASSIUM SERPL-SCNC: 4.6 MMOL/L (ref 3.5–5.3)
PROT SERPL-MCNC: 7 G/DL (ref 6.4–8.2)
PROT SERPL-MCNC: 7.2 G/DL (ref 6.4–8.2)
PROTHROMBIN TIME: 12.6 SECONDS (ref 11.6–14.5)
PROTHROMBIN TIME: 12.8 SECONDS (ref 11.6–14.5)
RBC # BLD AUTO: 3.27 MILLION/UL (ref 3.88–5.62)
SODIUM SERPL-SCNC: 131 MMOL/L (ref 136–145)
SODIUM SERPL-SCNC: 134 MMOL/L (ref 136–145)
TROPONIN I SERPL-MCNC: <0.02 NG/ML
WBC # BLD AUTO: 4.58 THOUSAND/UL (ref 4.31–10.16)

## 2021-06-04 PROCEDURE — 85025 COMPLETE CBC W/AUTO DIFF WBC: CPT

## 2021-06-04 PROCEDURE — 96374 THER/PROPH/DIAG INJ IV PUSH: CPT

## 2021-06-04 PROCEDURE — 80053 COMPREHEN METABOLIC PANEL: CPT | Performed by: STUDENT IN AN ORGANIZED HEALTH CARE EDUCATION/TRAINING PROGRAM

## 2021-06-04 PROCEDURE — 83735 ASSAY OF MAGNESIUM: CPT | Performed by: EMERGENCY MEDICINE

## 2021-06-04 PROCEDURE — 85049 AUTOMATED PLATELET COUNT: CPT | Performed by: STUDENT IN AN ORGANIZED HEALTH CARE EDUCATION/TRAINING PROGRAM

## 2021-06-04 PROCEDURE — 83735 ASSAY OF MAGNESIUM: CPT | Performed by: STUDENT IN AN ORGANIZED HEALTH CARE EDUCATION/TRAINING PROGRAM

## 2021-06-04 PROCEDURE — 82948 REAGENT STRIP/BLOOD GLUCOSE: CPT

## 2021-06-04 PROCEDURE — 85730 THROMBOPLASTIN TIME PARTIAL: CPT | Performed by: EMERGENCY MEDICINE

## 2021-06-04 PROCEDURE — 71045 X-RAY EXAM CHEST 1 VIEW: CPT

## 2021-06-04 PROCEDURE — 85730 THROMBOPLASTIN TIME PARTIAL: CPT | Performed by: STUDENT IN AN ORGANIZED HEALTH CARE EDUCATION/TRAINING PROGRAM

## 2021-06-04 PROCEDURE — 85610 PROTHROMBIN TIME: CPT | Performed by: EMERGENCY MEDICINE

## 2021-06-04 PROCEDURE — 93005 ELECTROCARDIOGRAM TRACING: CPT

## 2021-06-04 PROCEDURE — 85610 PROTHROMBIN TIME: CPT | Performed by: STUDENT IN AN ORGANIZED HEALTH CARE EDUCATION/TRAINING PROGRAM

## 2021-06-04 PROCEDURE — 36415 COLL VENOUS BLD VENIPUNCTURE: CPT

## 2021-06-04 PROCEDURE — 99285 EMERGENCY DEPT VISIT HI MDM: CPT

## 2021-06-04 PROCEDURE — 84484 ASSAY OF TROPONIN QUANT: CPT

## 2021-06-04 PROCEDURE — 99285 EMERGENCY DEPT VISIT HI MDM: CPT | Performed by: EMERGENCY MEDICINE

## 2021-06-04 RX ORDER — RANOLAZINE 500 MG/1
500 TABLET, EXTENDED RELEASE ORAL EVERY 12 HOURS SCHEDULED
Status: DISCONTINUED | OUTPATIENT
Start: 2021-06-04 | End: 2021-06-05 | Stop reason: HOSPADM

## 2021-06-04 RX ORDER — GABAPENTIN 300 MG/1
300 CAPSULE ORAL 3 TIMES DAILY
Status: DISCONTINUED | OUTPATIENT
Start: 2021-06-04 | End: 2021-06-05 | Stop reason: HOSPADM

## 2021-06-04 RX ORDER — ONDANSETRON 4 MG/1
4 TABLET, ORALLY DISINTEGRATING ORAL EVERY 6 HOURS PRN
Status: DISCONTINUED | OUTPATIENT
Start: 2021-06-04 | End: 2021-06-04

## 2021-06-04 RX ORDER — ASPIRIN 325 MG
325 TABLET ORAL ONCE
Status: COMPLETED | OUTPATIENT
Start: 2021-06-04 | End: 2021-06-04

## 2021-06-04 RX ORDER — LANOLIN ALCOHOL/MO/W.PET/CERES
100 CREAM (GRAM) TOPICAL DAILY
Status: DISCONTINUED | OUTPATIENT
Start: 2021-06-05 | End: 2021-06-04

## 2021-06-04 RX ORDER — LORAZEPAM 2 MG/ML
1 INJECTION INTRAMUSCULAR ONCE
Status: COMPLETED | OUTPATIENT
Start: 2021-06-04 | End: 2021-06-04

## 2021-06-04 RX ORDER — FLUTICASONE FUROATE AND VILANTEROL 200; 25 UG/1; UG/1
1 POWDER RESPIRATORY (INHALATION) EVERY 12 HOURS
Status: DISCONTINUED | OUTPATIENT
Start: 2021-06-04 | End: 2021-06-05 | Stop reason: HOSPADM

## 2021-06-04 RX ORDER — FOLIC ACID 1 MG/1
1 TABLET ORAL DAILY
Status: DISCONTINUED | OUTPATIENT
Start: 2021-06-05 | End: 2021-06-04

## 2021-06-04 RX ORDER — FOLIC ACID 1 MG/1
1000 TABLET ORAL DAILY
Status: DISCONTINUED | OUTPATIENT
Start: 2021-06-05 | End: 2021-06-05 | Stop reason: HOSPADM

## 2021-06-04 RX ORDER — LANOLIN ALCOHOL/MO/W.PET/CERES
100 CREAM (GRAM) TOPICAL DAILY
Status: DISCONTINUED | OUTPATIENT
Start: 2021-06-05 | End: 2021-06-05 | Stop reason: HOSPADM

## 2021-06-04 RX ORDER — PRAVASTATIN SODIUM 40 MG
40 TABLET ORAL
Status: DISCONTINUED | OUTPATIENT
Start: 2021-06-05 | End: 2021-06-04

## 2021-06-04 RX ORDER — MAGNESIUM SULFATE HEPTAHYDRATE 40 MG/ML
2 INJECTION, SOLUTION INTRAVENOUS ONCE
Status: COMPLETED | OUTPATIENT
Start: 2021-06-04 | End: 2021-06-04

## 2021-06-04 RX ORDER — VANCOMYCIN HYDROCHLORIDE 125 MG/1
125 CAPSULE ORAL 4 TIMES DAILY
COMMUNITY
End: 2021-06-05 | Stop reason: HOSPADM

## 2021-06-04 RX ORDER — LISINOPRIL 10 MG/1
10 TABLET ORAL DAILY
Status: DISCONTINUED | OUTPATIENT
Start: 2021-06-05 | End: 2021-06-05 | Stop reason: HOSPADM

## 2021-06-04 RX ORDER — AMLODIPINE BESYLATE 5 MG/1
5 TABLET ORAL DAILY
Status: DISCONTINUED | OUTPATIENT
Start: 2021-06-05 | End: 2021-06-05 | Stop reason: HOSPADM

## 2021-06-04 RX ORDER — PANTOPRAZOLE SODIUM 40 MG/1
40 TABLET, DELAYED RELEASE ORAL
Status: DISCONTINUED | OUTPATIENT
Start: 2021-06-05 | End: 2021-06-05 | Stop reason: HOSPADM

## 2021-06-04 RX ORDER — TAMSULOSIN HYDROCHLORIDE 0.4 MG/1
0.4 CAPSULE ORAL DAILY
Status: DISCONTINUED | OUTPATIENT
Start: 2021-06-05 | End: 2021-06-05 | Stop reason: HOSPADM

## 2021-06-04 RX ORDER — ONDANSETRON 2 MG/ML
4 INJECTION INTRAMUSCULAR; INTRAVENOUS EVERY 6 HOURS PRN
Status: DISCONTINUED | OUTPATIENT
Start: 2021-06-04 | End: 2021-06-05 | Stop reason: HOSPADM

## 2021-06-04 RX ORDER — FLUTICASONE FUROATE AND VILANTEROL 100; 25 UG/1; UG/1
1 POWDER RESPIRATORY (INHALATION)
Status: DISCONTINUED | OUTPATIENT
Start: 2021-06-05 | End: 2021-06-04

## 2021-06-04 RX ADMIN — RANOLAZINE 500 MG: 500 TABLET, FILM COATED, EXTENDED RELEASE ORAL at 22:00

## 2021-06-04 RX ADMIN — GABAPENTIN 300 MG: 300 CAPSULE ORAL at 22:00

## 2021-06-04 RX ADMIN — ASPIRIN 325 MG: 325 TABLET ORAL at 16:00

## 2021-06-04 RX ADMIN — LORAZEPAM 1 MG: 2 INJECTION INTRAMUSCULAR; INTRAVENOUS at 16:00

## 2021-06-04 RX ADMIN — METOPROLOL TARTRATE 25 MG: 25 TABLET, FILM COATED ORAL at 22:01

## 2021-06-04 RX ADMIN — MAGNESIUM SULFATE HEPTAHYDRATE 2 G: 40 INJECTION, SOLUTION INTRAVENOUS at 17:18

## 2021-06-04 RX ADMIN — MAGNESIUM OXIDE TAB 400 MG (241.3 MG ELEMENTAL MG) 400 MG: 400 (241.3 MG) TAB at 22:01

## 2021-06-04 RX ADMIN — FLUTICASONE FUROATE AND VILANTEROL TRIFENATATE 1 PUFF: 200; 25 POWDER RESPIRATORY (INHALATION) at 22:00

## 2021-06-04 NOTE — H&P
H&P Exam - Deneise Mcburney 63 y o  male MRN: [id number]    Unit/Bed#: ED 10 Encounter: [id number]    Assessment:  61-year-old male history of CAD s/p CABG and alcohol abuse admitted for chest pain  Patient will be admitted to Dr Seema Orellana service for less than 2 midnights  Plan:  Chest pain  Assessment & Plan  History of CAD s/p CABG presented with chest pain  Symptoms had resolved at the time of evaluation  EKG:  Normal sinus rhythm   Troponins< 02     Telemetry  ASA 81  Continue to monitor her symptoms    History of Atrial fibrillation [facility])  Assessment & Plan  EKG:    Telemetry  Monitor vitals  Continue home medication metoprolol 25 mg, ranolazine 500 mg q 12, amlodipine 5 mg    Diastolic heart failure (HCC)  Assessment & Plan  Wt Readings from Last 3 Encounters:   05/13/21 68 3 kg (150 lb 9 2 oz)   04/25/21 84 kg (185 lb 3 oz)   04/12/21 82 7 kg (182 lb 4 8 oz)     EF 23-20% grade 1 diastolic failure in 56/6064  Continue metoprolol tartrate 25 b i d , lisinopril 10 mg daily        Nicotine abuse  Assessment & Plan  Nicotine patch  CAD (coronary artery disease)  Assessment & Plan  Coronary disease s/p CABG 2004, PCI to LMCA 2004  Continue home statin  Continue home metoprolol, ranolazine and amlodipine daily    History of prostate cancer  Assessment & Plan  Continue home tamsulosin    Dyslipidemia  Assessment & Plan  Current transaminitis  Continue to hold statin  Hx of CABG  Assessment & Plan  Continue home lisinopril, metoprolol, statin, ranolazine     Essential hypertension  Assessment & Plan  BP on admission:  131/72    Continue home medications amlodipine, lisinopril, metoprolol and monitor vital signs    Type 2 diabetes mellitus ([facility] [address] )  Assessment & Plan  Lab Results   Component Value Date    HGBA1C 5 3 12/07/2020       No results for input(s): POCGLU in the last 72 hours  Last hemoglobin A1c 5 3 patient currently controlled off diabetic medications    ISS    Alcohol withdrawal syndrome without complication [facility])  Assessment & Plan  Patient given Ativan IV in ED  CIWA protocol  Continue thiamine, folate         COPD (chronic obstructive pulmonary disease) ([facility])  Assessment & Plan  Stable    Continue fluticasone 1 puff daily   Albuterol nebulizer p r n  For wheezing      History of Present Illness   53-year-old male with a history of CAD s/p CABG and alcohol abuse presents to ED today with complaint of chest pain  States that he has been having intermittent chest pain for the last 3 days  Describes it as sharp midsternal worse with lying flat and on the side  Pain that does not change with ambulation  States he has chronic shortness or breath however recent chest pain has not exacerbated it  Patient had stopped drinking for a week so he thought the chest pain might be withdrawal symptoms so he had a pt of alcohol yesterday however when the chest pain did not resolve he decided to come in for evaluation  ED course:  2 g magnesium, , Ativan 1 mg    Review of Systems   Constitutional: Negative for chills and fever  HENT: Negative for congestion  Respiratory: Positive for chest tightness and shortness of breath  Negative for wheezing  Cardiovascular: Positive for chest pain  Negative for leg swelling  Gastrointestinal: Negative for abdominal pain, constipation, diarrhea, nausea and vomiting  Endocrine: Negative for polydipsia, polyphagia and polyuria  Genitourinary: Negative for difficulty urinating and dysuria         Historical Information   Past Medical History:   Diagnosis Date    Cancer [facility])     prostate ca,had radiation    Cardiac disease     stents,then triple bypass    COPD [facility]chronic obstructive pulmonary disease) ([facility] [address] )     Diabetes mellitus ([facility] [address] )     ETOH abuse     Hx of heart artery stent     2014    Hyperlipidemia     Hypertension     Pneumonia     Prostate CA ([facility] [address] )     Renal disorder     pyelonephritis    S/P CABG x 1     2004     Past Surgical History:   Procedure Laterality Date    CORONARY ARTERY BYPASS GRAFT  2004    [place] ARTHRODESIS ANT INTERBODY MIN DISCECTOMY, CERVICAL BELOW C2 N/A 12/16/2020    Procedure: Anterior cervical discectomy with fusion C4-C7; Posterior cervical decompression and fusion C2-T2;  Surgeon: Douglas Valdovinos MD;  [facility];  Service: Neurosurgery    TONSILLECTOMY       Social History   Social History     Substance and Sexual Activity   Alcohol Use Yes    Alcohol/week: 10 0 standard drinks    Types: 10 Shots of liquor per week    Frequency: 4 or more times a week    Drinks per session: 10 or more    Binge frequency: Daily or almost daily    Comment: drinking a 5th of vodka daily     Social History     Substance and Sexual Activity   Drug Use No     Social History     Tobacco Use   Smoking Status Current Every Day Smoker    Packs/day: 1 50    Years: 40 00    Pack years: 60 00   Smokeless Tobacco Never Used     E-Cigarette Use: Never User     E-Cigarette/Vaping Substances       Family History: non-contributory    Meds/Allergies   all medications and allergies reviewed  No Known Allergies    Objective   First Vitals:   Blood Pressure: 159/85 (06/04/21 1522)  Pulse: 94 (06/04/21 1522)  Temperature: 98 6 °F (37 °C) (06/04/21 1522)  Temp Source: Tympanic (06/04/21 1522)  Respirations: 17 (06/04/21 1522)  SpO2: 96 % (06/04/21 1522)    Current Vitals:   Blood Pressure: 156/74 (06/04/21 1806)  Pulse: 75 (06/04/21 1806)  Temperature: 98 6 °F (37 °C) (06/04/21 1522)  Temp Source: Tympanic (06/04/21 1522)  Respirations: 18 (06/04/21 1806)  SpO2: 94 % (06/04/21 1806)    No intake or output data in the 24 hours ending 06/04/21 1851    Invasive Devices     Peripheral Intravenous Line            Peripheral IV 06/04/21 Left Antecubital less than 1 day                Physical Exam  Constitutional:       Appearance: He is normal weight  HENT:      Head: Normocephalic and atraumatic     Cardiovascular:      Rate and Rhythm: Normal rate and regular rhythm  Pulses: Normal pulses  Heart sounds: Normal heart sounds  Pulmonary:      Effort: Pulmonary effort is normal       Breath sounds: Normal breath sounds  Abdominal:      General: Bowel sounds are normal  There is no distension  Palpations: Abdomen is soft  Musculoskeletal: Normal range of motion  Skin:     General: Skin is warm and dry  Capillary Refill: Capillary refill takes less than 2 seconds  Neurological:      General: No focal deficit present  Mental Status: He is alert and oriented to person, place, and time     Psychiatric:         Mood and Affect: Mood normal          Behavior: Behavior normal          Lab Results:   Results for orders placed or performed during the hospital encounter of 06/04/21   CBC and differential   Result Value Ref Range    WBC 4 58 4 31 - 10 16 Thousand/uL    RBC 3 27 (L) 3 88 - 5 62 Million/uL    Hemoglobin 11 0 (L) 12 0 - 17 0 g/dL    Hematocrit 32 1 (L) 36 5 - 49 3 %    MCV 98 82 - 98 fL    MCH 33 6 26 8 - 34 3 pg    MCHC 34 3 31 4 - 37 4 g/dL    RDW 14 7 11 6 - 15 1 %    MPV 10 5 8 9 - 12 7 fL    Platelets 91 (L) 060 - 390 Thousands/uL    nRBC 0 /100 WBCs    Neutrophils Relative 62 43 - 75 %    Immat GRANS % 0 0 - 2 %    Lymphocytes Relative 22 14 - 44 %    Monocytes Relative 12 4 - 12 %    Eosinophils Relative 3 0 - 6 %    Basophils Relative 1 0 - 1 %    Neutrophils Absolute 2 79 1 85 - 7 62 Thousands/µL    Immature Grans Absolute 0 02 0 00 - 0 20 Thousand/uL    Lymphocytes Absolute 1 02 0 60 - 4 47 Thousands/µL    Monocytes Absolute 0 56 0 17 - 1 22 Thousand/µL    Eosinophils Absolute 0 13 0 00 - 0 61 Thousand/µL    Basophils Absolute 0 06 0 00 - 0 10 Thousands/µL   Comprehensive metabolic panel   Result Value Ref Range    Sodium 134 (L) 136 - 145 mmol/L    Potassium 4 5 3 5 - 5 3 mmol/L    Chloride 97 (L) 100 - 108 mmol/L    CO2 26 21 - 32 mmol/L    ANION GAP 11 4 - 13 mmol/L    BUN 12 5 - 25 mg/dL    Creatinine 1 12 0 60 - 1 30 mg/dL    Glucose 95 65 - 140 mg/dL    Calcium 8 7 8 3 - 10 1 mg/dL    Corrected Calcium 9 3 8 3 - 10 1 mg/dL     (H) 5 - 45 U/L    ALT 59 12 - 78 U/L    Alkaline Phosphatase 124 (H) 46 - 116 U/L    Total Protein 7 2 6 4 - 8 2 g/dL    Albumin 3 3 (L) 3 5 - 5 0 g/dL    Total Bilirubin 0 46 0 20 - 1 00 mg/dL    eGFR 72 ml/min/1 73sq m   Troponin I   Result Value Ref Range    Troponin I <0 02 <=0 04 ng/mL   Protime-INR   Result Value Ref Range    Protime 12 6 11 6 - 14 5 seconds    INR 0 95 0 84 - 1 19   APTT   Result Value Ref Range    PTT 29 23 - 37 seconds   Magnesium   Result Value Ref Range    Magnesium 1 5 (L) 1 6 - 2 6 mg/dL   Platelet count   Result Value Ref Range    Platelets 84 (L) 205 - 390 Thousands/uL    MPV 11 2 8 9 - 12 7 fL     Imaging:   XR chest 1 view portable    (Results Pending)       EKG, Pathology, and Other Studies: Rate:     ECG rate:  83    ECG rate assessment: normal    Rhythm:     Rhythm: sinus rhythm    Ectopy:     Ectopy: none    QRS:     QRS axis:  Normal    QRS intervals:  Normal  Conduction:     Conduction: normal    ST segments:     ST segments:  Non-specific  T waves:     T waves: normal         Code Status: Prior  Advance Directive and Living Will:      Power of :    POLST:      Counseling / Coordination of Care: Total floor / unit time spent today 20 minutes

## 2021-06-04 NOTE — ED PROVIDER NOTES
History  Chief Complaint   Patient presents with    Chest Pain     pt reports left anterior stabbing chest pain since Tuesday that is intermittent "happens more when I am lying flat"     Patient has a significant coronary artery disease status post CABG and PTCA  He also has a history of alcohol abuse and has recently been trying to quit drinking  Patient states he has gone 3 days without drinking any alcohol  He developed sharp intermittent pain in the chest starting yesterday was present throughout the day today  Patient states the pain is in his heart, pointing to the left chest and comes and goes frequently  Not associated with exertion  There is no shortness of breath  Patient does not recall his last stress test or encounter with the cardiologist          Prior to Admission Medications   Prescriptions Last Dose Informant Patient Reported? Taking?    Blood Glucose Monitoring Suppl (ONE TOUCH ULTRA MINI) w/Device KIT  Self Yes Yes   Sig: Use as directed   Breo Ellipta 200-25 MCG/INH inhaler   No Yes   Sig: INHALE 1 PUFF EVERY 12 HOURS   ONETOUCH DELICA LANCETS FINE MISC  Self Yes Yes   Sig: 3 (three) times a day Test   Ventolin  (90 Base) MCG/ACT inhaler   No Yes   Sig: INHALE 2 PUFFS EVERY 4 HOURS AS NEEDED FOR WHEEZING   amLODIPine (NORVASC) 5 mg tablet 6/4/2021 at Unknown time  No Yes   Sig: TAKE 1 TABLET BY MOUTH EVERY DAY   fluticasone-salmeterol (ADVAIR, WIXELA) 500-50 mcg/dose inhaler   No Yes   Sig: Inhale 1 puff every 12 (twelve) hours   folic acid (FOLVITE) 1 mg tablet 6/4/2021 at Unknown time  No Yes   Sig: TAKE 1 TABLET BY MOUTH EVERY DAY   gabapentin (NEURONTIN) 300 mg capsule 6/4/2021 at Unknown time  No Yes   Sig: Take 1 capsule (300 mg total) by mouth 3 (three) times a day   lisinopril (ZESTRIL) 10 mg tablet 6/4/2021 at Unknown time  No Yes   Sig: Take 1 tablet (10 mg total) by mouth daily   magnesium oxide (MAG-OX) 400 mg 6/4/2021 at Unknown time  No Yes   Sig: TAKE 1 TABLET BY MOUTH TWICE A DAY   metFORMIN (GLUCOPHAGE) 1000 MG tablet 6/4/2021 at Unknown time Self Yes Yes   Sig: Take 1,000 mg by mouth 2 (two) times a day with meals   metoprolol tartrate (LOPRESSOR) 25 mg tablet 6/4/2021 at Unknown time  No Yes   Sig: TAKE 1 TABLET BY MOUTH EVERY 12 HOURS   pravastatin (PRAVACHOL) 40 mg tablet 6/4/2021 at Unknown time  No Yes   Sig: TAKE 1 TABLET BY MOUTH EVERY DAY WITH DINNER   ranolazine (RANEXA) 500 mg 12 hr tablet 6/4/2021 at Unknown time  No Yes   Sig: Take 1 tablet (500 mg total) by mouth every 12 (twelve) hours   tamsulosin (FLOMAX) 0 4 mg 6/4/2021 at Unknown time  Yes Yes   Sig: Take 0 4 mg by mouth daily   thiamine 100 MG tablet 6/4/2021 at Unknown time  No Yes   Sig: TAKE 1 TABLET BY MOUTH EVERY DAY   vancomycin (VANCOCIN) 125 MG capsule 6/4/2021 at Unknown time Self Yes Yes   Sig: Take 125 mg by mouth 4 (four) times a day      Facility-Administered Medications: None       Past Medical History:   Diagnosis Date    Cancer [facility])     prostate ca,had radiation    Cardiac disease     stents,then triple bypass    COPD [facility]chronic obstructive [facility] disease) ([facility] [address]     Diabetes mellitus ([facility] [address] [facility]     ETOH abuse     Hx of heart artery stent     2014    Hyperlipidemia     Hypertension     Pneumonia     Prostate CA ([facility] [address] [facility]     Renal disorder     pyelonephritis    S/P CABG x 1     2004       Past Surgical History:   Procedure Laterality Date    CORONARY ARTERY BYPASS GRAFT  2004    [place] ARTHRODESIS ANT INTERBODY MIN DISCECTOMY, CERVICAL BELOW C2 N/A 12/16/2020    Procedure: Anterior cervical discectomy with fusion C4-C7; Posterior cervical decompression and fusion C2-T2;  Surgeon: Richard Sawyer MD;  Location[facility];  Service: Neurosurgery    TONSILLECTOMY         Family History   Problem Relation Age of Onset    Diabetes Mother     Uterine cancer Mother     COPD Father     Hypertension Father      I have reviewed and agree with the history as documented      E-Cigarette/Vaping  E-Cigarette Use Never User      E-Cigarette/Vaping Substances     Social History     Tobacco Use    Smoking status: Current Every Day Smoker     Packs/day: 1 50     Years: 40 00     Pack years: 60 00    Smokeless tobacco: Never Used   Substance Use Topics    Alcohol use: Yes     Alcohol/week: 10 0 standard drinks     Types: 10 Shots of liquor per week     Frequency: 4 or more times a week     Drinks per session: 10 or more     Binge frequency: Daily or almost daily     Comment: drinking a 5th of vodka daily    Drug use: No       Review of Systems   Constitutional: Negative for chills and fever  HENT: Positive for congestion  Negative for sore throat  Eyes: Negative for visual disturbance  Respiratory: Positive for cough  Negative for shortness of breath  Cardiovascular: Positive for chest pain  Negative for palpitations and leg swelling  Gastrointestinal: Negative for abdominal pain and vomiting  Genitourinary: Negative for dysuria  Musculoskeletal: Positive for neck pain and neck stiffness  Skin: Positive for rash  Neurological: Positive for tremors, weakness and light-headedness  Negative for headaches  Hematological: Bruises/bleeds easily  Psychiatric/Behavioral: Negative for confusion  All other systems reviewed and are negative  Physical Exam  Physical Exam  Vitals signs and nursing note reviewed  Constitutional:       Appearance: He is well-developed  HENT:      Head: Normocephalic  Eyes:      Pupils: Pupils are equal, round, and reactive to light  Neck:      Musculoskeletal: Normal range of motion  Comments: Extended posterior surgical scar  Cardiovascular:      Rate and Rhythm: Normal rate and regular rhythm  Heart sounds: Normal heart sounds  Pulmonary:      Breath sounds: Wheezing present  Chest:      Chest wall: No tenderness  Abdominal:      General: Bowel sounds are normal       Palpations: Abdomen is soft     Musculoskeletal: Normal range of motion  Right lower leg: He exhibits no tenderness  No edema  Left lower leg: He exhibits no tenderness  No edema  Skin:     General: Skin is warm and dry  Capillary Refill: Capillary refill takes less than 2 seconds  Neurological:      General: No focal deficit present  Mental Status: He is alert     Psychiatric:         Mood and Affect: Mood normal          Behavior: Behavior normal          Vital Signs  ED Triage Vitals [06/04/21 1522]   Temperature Pulse Respirations Blood Pressure SpO2   98 6 °F (37 °C) 94 17 159/85 96 %      Temp Source Heart Rate Source Patient Position - Orthostatic VS BP Location FiO2 (%)   Tympanic -- Sitting Left arm --      Pain Score       No Pain           Vitals:    06/04/21 1522 06/04/21 1630   BP: 159/85 131/72   Pulse: 94 76   Patient Position - Orthostatic VS: Sitting          Visual Acuity      ED Medications  Medications   magnesium sulfate 2 g/50 mL IVPB (premix) 2 g (has no administration in time range)   aspirin tablet 325 mg (325 mg Oral Given 6/4/21 1600)   LORazepam (ATIVAN) injection 1 mg (1 mg Intravenous Given 6/4/21 1600)       Diagnostic Studies  Results Reviewed     Procedure Component Value Units Date/Time    Magnesium [[id number]]  (Abnormal) Collected: 06/04/21 1559    Lab Status: Final result Specimen: Blood from Arm, Left Updated: 06/04/21 1626     Magnesium 1 5 mg/dL     Protime-INR [[id number]]  (Normal) Collected: 06/04/21 1559    Lab Status: Final result Specimen: Blood from Arm, Left Updated: 06/04/21 1624     Protime 12 6 seconds      INR 0 95    APTT [[id number]]  (Normal) Collected: 06/04/21 1559    Lab Status: Final result Specimen: Blood from Arm, Left Updated: 06/04/21 1624     PTT 29 seconds     Troponin I [[id number]]  (Normal) Collected: 06/04/21 1539    Lab Status: Final result Specimen: Blood from Arm, Left Updated: 06/04/21 1603     Troponin I <0 02 ng/mL     CBC and differential [[id number]]  (Abnormal) Collected: 06/04/21 1539    Lab Status: Final result Specimen: Blood from Arm, Left Updated: 06/04/21 1602     WBC 4 58 Thousand/uL      RBC 3 27 Million/uL      Hemoglobin 11 0 g/dL      Hematocrit 32 1 %      MCV 98 fL      MCH 33 6 pg      MCHC 34 3 g/dL      RDW 14 7 %      MPV 10 5 fL      Platelets 91 Thousands/uL      nRBC 0 /100 WBCs      Neutrophils Relative 62 %      Immat GRANS % 0 %      Lymphocytes Relative 22 %      Monocytes Relative 12 %      Eosinophils Relative 3 %      Basophils Relative 1 %      Neutrophils Absolute 2 79 Thousands/µL      Immature Grans Absolute 0 02 Thousand/uL      Lymphocytes Absolute 1 02 Thousands/µL      Monocytes Absolute 0 56 Thousand/µL      Eosinophils Absolute 0 13 Thousand/µL      Basophils Absolute 0 06 Thousands/µL     Narrative:       No Clots    Comprehensive metabolic panel [[id number]]  (Abnormal) Collected: 06/04/21 1539    Lab Status: Final result Specimen: Blood from Arm, Left Updated: 06/04/21 1600     Sodium 134 mmol/L      Potassium 4 5 mmol/L      Chloride 97 mmol/L      CO2 26 mmol/L      ANION GAP 11 mmol/L      BUN 12 mg/dL      Creatinine 1 12 mg/dL      Glucose 95 mg/dL      Calcium 8 7 mg/dL      Corrected Calcium 9 3 mg/dL       U/L      ALT 59 U/L      Alkaline Phosphatase 124 U/L      Total Protein 7 2 g/dL      Albumin 3 3 g/dL      Total Bilirubin 0 46 mg/dL      eGFR 72 ml/min/1 73sq m     Narrative:      [place] guidelines for Chronic Kidney Disease (CKD):     Stage 1 with normal or high GFR (GFR > 90 mL/min/1 73 square meters)    Stage 2 Mild CKD (GFR = 60-89 mL/min/1 73 square meters)    Stage 3A Moderate CKD (GFR = 45-59 mL/min/1 73 square meters)    Stage 3B Moderate CKD (GFR = 30-44 mL/min/1 73 square meters)    Stage 4 Severe CKD (GFR = 15-29 mL/min/1 73 square meters)    Stage 5 End Stage CKD (GFR <15 mL/min/1 73 square meters)  Note: GFR calculation is accurate only with a steady state creatinine                 XR chest 1 view portable    (Results Pending)              Procedures  ECG 12 Lead Documentation Only    Date/Time: 6/4/2021 3:27 PM  Performed by: Smooth Arellano MD  Authorized by: Smooth Arellano MD     Indications / Diagnosis:  Chest pain  ECG reviewed by me, the ED Provider: yes    Patient location:  ED  Previous ECG:     Previous ECG:  Compared to current    Similarity:  No change  Interpretation:     Interpretation: abnormal    Rate:     ECG rate:  83    ECG rate assessment: normal    Rhythm:     Rhythm: sinus rhythm    Ectopy:     Ectopy: none    QRS:     QRS axis:  Normal    QRS intervals:  Normal  Conduction:     Conduction: normal    ST segments:     ST segments:  Non-specific  T waves:     T waves: normal               ED Course             HEART Risk Score      Most Recent Value   Heart Score Risk Calculator   History  1 Filed at: 06/04/2021 1634   ECG  0 Filed at: 06/04/2021 1634   Age  1 Filed at: 06/04/2021 1634   Risk Factors  2 Filed at: 06/04/2021 1634   Troponin  0 Filed at: 06/04/2021 1634   HEART Score  4 Filed at: 06/04/2021 1634                                    MDM  Number of Diagnoses or Management Options  Diagnosis management comments: Patient has extensive cardiac history and continues to smoke  He is seems poorly reliable  Disposition  Final diagnoses:   Chest pain   Hypomagnesemia     Time reflects when diagnosis was documented in both MDM as applicable and the Disposition within this note     Time User Action Codes Description Comment    6/4/2021  4:35 PM Cindy Ellison A Add [R07 9] Chest pain     6/4/2021  4:35 PM Dauna Grabiel Add [E83 42] Hypomagnesemia       ED Disposition     ED Disposition Condition Date/Time Comment    Admit Stable Fri Jun 4, 2021  4:35 PM Case was discussed with [facility] family practice and the patient's admission status was agreed to be Admission Status: observation status to the service of Dr Yana Flaherty           Follow-up Information    None Patient's Medications   Discharge Prescriptions    No medications on file     No discharge procedures on file      PDMP Review       Value Time User    PDMP Reviewed  Yes 12/21/2020  3:08 PM Orlando Waldron PA-C          ED Provider  Electronically Signed by           Jayant Johnson MD  06/04/21 9513

## 2021-06-04 NOTE — ASSESSMENT & PLAN NOTE
Lab Results   Component Value Date    HGBA1C 5 3 12/07/2020       Recent Labs     06/05/21  0706 06/05/21  1136 06/05/21  1143 06/06/21  1121   POCGLU 88 110 265* 109       Last hemoglobin A1c 5 3 patient currently controlled off diabetic medications    ISS

## 2021-06-04 NOTE — ASSESSMENT & PLAN NOTE
BP on admission:  131/72    · Continue amlodipine 5 mg p o  Q d  · Continue lisinopril 10 mg p o  q d     · Continue metoprolol 25 mg p o  q 12  · Continue monitor vital signs

## 2021-06-04 NOTE — ASSESSMENT & PLAN NOTE
· Coronary disease s/p CABG 2004, PCI to LMCA 2004  While recommendation was made initially for Plavix/Eliquis, not started given history of falls, chronic alcohol use and high risk of bleeding      · Continue metoprolol 25 mg po BID, Ranexa 500 mg po BID, and amlodipine 5 mg po daily   · Statin held because of acute transaminitis secondary to alcohol intoxication

## 2021-06-04 NOTE — ASSESSMENT & PLAN NOTE
· Telemetry- patient has been in NSR since arrival   · Monitor vitals  · Continue home medication metoprolol 25 mg  · [id number] score 3 however patient has HAS-BLED score of 2 (elevated liver enzymes and alcohol abuse)  Patient been off anticoagulation because of history of multiple falls with his alcohol abuse that puts him at increased risk for potentially fatal bleed

## 2021-06-04 NOTE — ASSESSMENT & PLAN NOTE
History of CAD s/p CABG presented with chest pain  Symptoms had resolved at the time of evaluation    EKG:  Normal sinus rhythm   Troponins< 02     Telemetry  ASA 81  Continue to monitor symptoms

## 2021-06-05 VITALS
WEIGHT: 169 LBS | RESPIRATION RATE: 18 BRPM | SYSTOLIC BLOOD PRESSURE: 131 MMHG | OXYGEN SATURATION: 96 % | TEMPERATURE: 98.8 F | BODY MASS INDEX: 21.69 KG/M2 | DIASTOLIC BLOOD PRESSURE: 71 MMHG | HEART RATE: 66 BPM | HEIGHT: 74 IN

## 2021-06-05 LAB
ALBUMIN SERPL BCP-MCNC: 3.1 G/DL (ref 3.5–5)
ALP SERPL-CCNC: 119 U/L (ref 46–116)
ALT SERPL W P-5'-P-CCNC: 57 U/L (ref 12–78)
ANION GAP SERPL CALCULATED.3IONS-SCNC: 8 MMOL/L (ref 4–13)
AST SERPL W P-5'-P-CCNC: 99 U/L (ref 5–45)
BASOPHILS # BLD AUTO: 0.06 THOUSANDS/ΜL (ref 0–0.1)
BASOPHILS NFR BLD AUTO: 1 % (ref 0–1)
BILIRUB SERPL-MCNC: 0.51 MG/DL (ref 0.2–1)
BUN SERPL-MCNC: 13 MG/DL (ref 5–25)
CALCIUM ALBUM COR SERPL-MCNC: 9.4 MG/DL (ref 8.3–10.1)
CALCIUM SERPL-MCNC: 8.7 MG/DL (ref 8.3–10.1)
CHLORIDE SERPL-SCNC: 99 MMOL/L (ref 100–108)
CO2 SERPL-SCNC: 28 MMOL/L (ref 21–32)
CREAT SERPL-MCNC: 1.13 MG/DL (ref 0.6–1.3)
EOSINOPHIL # BLD AUTO: 0.13 THOUSAND/ΜL (ref 0–0.61)
EOSINOPHIL NFR BLD AUTO: 3 % (ref 0–6)
ERYTHROCYTE [DISTWIDTH] IN BLOOD BY AUTOMATED COUNT: 14.7 % (ref 11.6–15.1)
ETHANOL SERPL-MCNC: <3 MG/DL (ref 0–3)
GFR SERPL CREATININE-BSD FRML MDRD: 71 ML/MIN/1.73SQ M
GLUCOSE SERPL-MCNC: 110 MG/DL (ref 65–140)
GLUCOSE SERPL-MCNC: 265 MG/DL (ref 65–140)
GLUCOSE SERPL-MCNC: 88 MG/DL (ref 65–140)
GLUCOSE SERPL-MCNC: 94 MG/DL (ref 65–140)
HCT VFR BLD AUTO: 33.5 % (ref 36.5–49.3)
HGB BLD-MCNC: 11.1 G/DL (ref 12–17)
IMM GRANULOCYTES # BLD AUTO: 0.01 THOUSAND/UL (ref 0–0.2)
IMM GRANULOCYTES NFR BLD AUTO: 0 % (ref 0–2)
LYMPHOCYTES # BLD AUTO: 1.25 THOUSANDS/ΜL (ref 0.6–4.47)
LYMPHOCYTES NFR BLD AUTO: 30 % (ref 14–44)
MAGNESIUM SERPL-MCNC: 1.8 MG/DL (ref 1.6–2.6)
MCH RBC QN AUTO: 33.1 PG (ref 26.8–34.3)
MCHC RBC AUTO-ENTMCNC: 33.1 G/DL (ref 31.4–37.4)
MCV RBC AUTO: 100 FL (ref 82–98)
MONOCYTES # BLD AUTO: 0.55 THOUSAND/ΜL (ref 0.17–1.22)
MONOCYTES NFR BLD AUTO: 13 % (ref 4–12)
NEUTROPHILS # BLD AUTO: 2.14 THOUSANDS/ΜL (ref 1.85–7.62)
NEUTS SEG NFR BLD AUTO: 53 % (ref 43–75)
NRBC BLD AUTO-RTO: 0 /100 WBCS
PHOSPHATE SERPL-MCNC: 4.2 MG/DL (ref 2.7–4.5)
PLATELET # BLD AUTO: 77 THOUSANDS/UL (ref 149–390)
PMV BLD AUTO: 11.1 FL (ref 8.9–12.7)
POTASSIUM SERPL-SCNC: 4.4 MMOL/L (ref 3.5–5.3)
PROT SERPL-MCNC: 6.9 G/DL (ref 6.4–8.2)
RBC # BLD AUTO: 3.35 MILLION/UL (ref 3.88–5.62)
SODIUM SERPL-SCNC: 135 MMOL/L (ref 136–145)
TROPONIN I SERPL-MCNC: <0.02 NG/ML
WBC # BLD AUTO: 4.14 THOUSAND/UL (ref 4.31–10.16)

## 2021-06-05 PROCEDURE — 85025 COMPLETE CBC W/AUTO DIFF WBC: CPT | Performed by: STUDENT IN AN ORGANIZED HEALTH CARE EDUCATION/TRAINING PROGRAM

## 2021-06-05 PROCEDURE — 99218 PR INITIAL OBSERVATION CARE/DAY 30 MINUTES: CPT | Performed by: FAMILY MEDICINE

## 2021-06-05 PROCEDURE — 82948 REAGENT STRIP/BLOOD GLUCOSE: CPT

## 2021-06-05 PROCEDURE — 84484 ASSAY OF TROPONIN QUANT: CPT | Performed by: STUDENT IN AN ORGANIZED HEALTH CARE EDUCATION/TRAINING PROGRAM

## 2021-06-05 PROCEDURE — 84100 ASSAY OF PHOSPHORUS: CPT | Performed by: STUDENT IN AN ORGANIZED HEALTH CARE EDUCATION/TRAINING PROGRAM

## 2021-06-05 PROCEDURE — 82077 ASSAY SPEC XCP UR&BREATH IA: CPT | Performed by: STUDENT IN AN ORGANIZED HEALTH CARE EDUCATION/TRAINING PROGRAM

## 2021-06-05 PROCEDURE — NC001 PR NO CHARGE: Performed by: FAMILY MEDICINE

## 2021-06-05 PROCEDURE — 83735 ASSAY OF MAGNESIUM: CPT | Performed by: STUDENT IN AN ORGANIZED HEALTH CARE EDUCATION/TRAINING PROGRAM

## 2021-06-05 RX ORDER — MAGNESIUM SULFATE 1 G/100ML
1 INJECTION INTRAVENOUS ONCE
Status: COMPLETED | OUTPATIENT
Start: 2021-06-05 | End: 2021-06-05

## 2021-06-05 RX ADMIN — METOPROLOL TARTRATE 25 MG: 25 TABLET, FILM COATED ORAL at 09:09

## 2021-06-05 RX ADMIN — MAGNESIUM OXIDE TAB 400 MG (241.3 MG ELEMENTAL MG) 400 MG: 400 (241.3 MG) TAB at 09:09

## 2021-06-05 RX ADMIN — TAMSULOSIN HYDROCHLORIDE 0.4 MG: 0.4 CAPSULE ORAL at 09:10

## 2021-06-05 RX ADMIN — THIAMINE HCL TAB 100 MG 100 MG: 100 TAB at 09:09

## 2021-06-05 RX ADMIN — FLUTICASONE FUROATE AND VILANTEROL TRIFENATATE 1 PUFF: 200; 25 POWDER RESPIRATORY (INHALATION) at 09:13

## 2021-06-05 RX ADMIN — RANOLAZINE 500 MG: 500 TABLET, FILM COATED, EXTENDED RELEASE ORAL at 09:09

## 2021-06-05 RX ADMIN — PANTOPRAZOLE SODIUM 40 MG: 40 TABLET, DELAYED RELEASE ORAL at 05:12

## 2021-06-05 RX ADMIN — LISINOPRIL 10 MG: 10 TABLET ORAL at 09:09

## 2021-06-05 RX ADMIN — ENOXAPARIN SODIUM 40 MG: 40 INJECTION SUBCUTANEOUS at 09:10

## 2021-06-05 RX ADMIN — GABAPENTIN 300 MG: 300 CAPSULE ORAL at 09:09

## 2021-06-05 RX ADMIN — MAGNESIUM SULFATE HEPTAHYDRATE 1 G: 1 INJECTION, SOLUTION INTRAVENOUS at 06:47

## 2021-06-05 RX ADMIN — Medication 1 TABLET: at 09:09

## 2021-06-05 RX ADMIN — METFORMIN HYDROCHLORIDE 1000 MG: 500 TABLET ORAL at 09:09

## 2021-06-05 RX ADMIN — AMLODIPINE BESYLATE 5 MG: 5 TABLET ORAL at 09:09

## 2021-06-05 RX ADMIN — INSULIN LISPRO 3 UNITS: 100 INJECTION, SOLUTION INTRAVENOUS; SUBCUTANEOUS at 12:18

## 2021-06-05 RX ADMIN — FOLIC ACID 1000 MCG: 1 TABLET ORAL at 09:09

## 2021-06-05 NOTE — NURSING NOTE
The patient discharged home  AVS and all the discharge instructions provided to the patient regarding the labs, follow up appointments, the diet, safety, daily weight, and meds side effects  The patient verbalized well understandings  Lul Condon

## 2021-06-05 NOTE — ASSESSMENT & PLAN NOTE
Currently on [facility] protocol  EtOH ordered for this AM  Received once dose of Ativan in ED on 6/4  Continue Thiamine 100 mg po daily and folic acid 1 mg po daily

## 2021-06-05 NOTE — DISCHARGE SUMMARY
[facility] Discharge Summary - Medical Mario Alberto Villalobos 63 y o  male MRN: [id number]    [facility] [address] Room / Bed: Giovanni Godwin-* Encounter: [id number]    BRIEF OVERVIEW  Admitting Provider: Elena Maldonado MD  Discharge Provider: Elena Maldonado MD    Discharge To: Home    Outpatient Follow-Up:  [facility], Cardiology  Things to address at first follow up visit:   1  Has patient had reoccurrence of chest pain? 2  Has patient followed up with cardiology outpatient? He has history of atrial fibrillation as well as CAD and is status post CABG  He is not currently on anticoagulation because he is a fall risk  3  Is patient interested in cessation of alcohol? Labs and results pending at discharge: none    Admission Date: 6/4/2021     Discharge Date: 6/5/2021    Primary Discharge Diagnosis  Principal Problem:    Chest pain  Active Problems:    COPD (chronic obstructive pulmonary disease) (HCC)    Alcohol withdrawal syndrome without complication (HCC)    Type 2 diabetes mellitus (HCC)    Essential hypertension    Hx of CABG    Dyslipidemia    History of prostate cancer    CAD (coronary artery disease)    Nicotine abuse    Alcohol abuse    Diastolic heart failure (HCC)    History of Atrial fibrillation ([facility] [address] )    Transaminitis  Resolved Problems:    * No resolved hospital problems  *    Consulting Providers   None        DETAILS OF HOSPITAL STAY    Procedures Performed/Pertinent Test results  6/5 troponin negative  ETOH negative  6/4: WBC 4 58, Hg 11 0, Na 131, K 4 6, Cr 1 13, Mg 1 5-->2 4, Ca 8 5, , ALT 60      HPI from admission H&P  17-year-old male with a history of CAD s/p CABG and alcohol abuse presents to ED today with complaint of chest pain  States that he has been having intermittent chest pain for the last 3 days  Describes it as sharp midsternal worse with lying flat and on the side  Pain that does not change with ambulation  States he has chronic shortness or breath however recent chest pain has not exacerbated it  Patient had stopped drinking for a week so he thought the chest pain might be withdrawal symptoms so he had a pt of alcohol yesterday however when the chest pain did not resolve he decided to come in for evaluation  Hospital Course  Patient was kept overnight for observation to monitor for alcohol withdrawal signs or further chest pain  He did not experience any further recurrences of chest pain during his admission  He did not experience alcohol withdrawal   Troponins were negative x2  ETOH was negative on day of discharge  Pravastatin should be restarted on discharge as patient has multiple cardiovascular risks including CAD, type 2 diabetes, and extensive smoking history (this was held during admission because of elevated liver enzymes)  Patient was ambulating, eating, drinking, urinating and having normal bowel movements on day of discharge  Physical Exam at Discharge  Constitutional:       General: He is not in acute distress  Appearance: He is not toxic-appearing  HENT:      Head: Normocephalic and atraumatic  Mouth/Throat:      Mouth: Mucous membranes are moist    Cardiovascular:      Rate and Rhythm: Normal rate and regular rhythm  Pulses: Normal pulses  Heart sounds: No murmur  Pulmonary:      Effort: Pulmonary effort is normal       Breath sounds: Normal breath sounds  Abdominal:      General: Abdomen is flat  Bowel sounds are normal  There is no distension  Musculoskeletal:         General: No swelling or signs of injury  Right lower leg: No edema  Left lower leg: No edema  Skin:     General: Skin is warm and dry  Neurological:      General: No focal deficit present  Mental Status: He is alert and oriented to person, place, and time  Psychiatric:         Mood and Affect: Mood normal          Behavior: Behavior normal          Thought Content:  Thought content normal          Judgment: Judgment normal      Medications   CONTINUE taking these medications    CONTINUE taking these medications     Morning Afternoon Evening Bedtime As Needed    amLODIPine 5 mg tablet  Commonly known as: NORVASC  TAKE 1 TABLET BY MOUTH EVERY DAY  Refills: 2  Last time this was given: 5 mg on June 5, 2021  9:09 AM         Breo Ellipta 200-25 MCG/INH inhaler  Generic drug: fluticasone-vilanterol  INHALE 1 PUFF EVERY 12 HOURS  Refills: 0  Last time this was given: 1 puff on June 5, 2021  9:13 AM         fluticasone-salmeterol 500-50 mcg/dose inhaler  Commonly known as: ADVAIR, WIXELA  Inhale 1 puff every 12 (twelve) hours  Refills: 3  Doctor's comments: Substitution to a formulary equivalent within the same pharmaceutical class is authorized           folic acid 1 mg tablet  Commonly known as: FOLVITE  TAKE 1 TABLET BY MOUTH EVERY DAY  Refills: 2  Last time this was given: 1,000 mcg on June 5, 2021  9:09 AM         gabapentin 300 mg capsule  Commonly known as: NEURONTIN  Take 1 capsule (300 mg total) by mouth 3 (three) times a day  Refills: 3  Last time this was given: 300 mg on June 5, 2021  9:09 AM         lisinopril 10 mg tablet  Commonly known as: ZESTRIL  Take 1 tablet (10 mg total) by mouth daily  Refills: 2  Last time this was given: 10 mg on June 5, 2021  9:09 AM         magnesium oxide 400 mg  Commonly known as: MAG-OX  TAKE 1 TABLET BY MOUTH TWICE A DAY  Refills: 2  Last time this was given: 400 mg on June 5, 2021  9:09 AM         metFORMIN 1000 MG tablet  Commonly known as: GLUCOPHAGE  Take 1,000 mg by mouth 2 (two) times a day with meals  Refills: 0  Last time this was given: 1,000 mg on June 5, 2021  9:09 AM         metoprolol tartrate 25 mg tablet  Commonly known as: LOPRESSOR  TAKE 1 TABLET BY MOUTH EVERY 12 HOURS  Refills: 2  Last time this was given: 25 mg on June 5, 2021  9:09 AM         pravastatin 40 mg tablet  Commonly known as: PRAVACHOL  TAKE 1 TABLET BY MOUTH EVERY DAY WITH DINNER  Refills: 2         ranolazine 500 mg 12 hr tablet  Commonly known as: RANEXA  Take 1 tablet (500 mg total) by mouth every 12 (twelve) hours  Refills: 3  Last time this was given: 500 mg on June 5, 2021  9:09 AM         tamsulosin 0 4 mg  Commonly known as: FLOMAX  Take 0 4 mg by mouth daily  Refills: 0  Last time this was given: 0 4 mg on June 5, 2021  9:10 AM         thiamine 100 MG tablet  TAKE 1 TABLET BY MOUTH EVERY DAY  Refills: 2  Last time this was given: 100 mg on June 5, 2021  9:09 AM         Ventolin HFA 90 mcg/act inhaler  Generic drug: albuterol  INHALE 2 PUFFS EVERY 4 HOURS AS NEEDED FOR WHEEZING  Refills: 0        OTHER medications on file    OTHER medications on file     Morning Afternoon Evening Bedtime As Needed    ONE TOUCH ULTRA MINI w/Device Kit  Use as directed  Refills: 0         OneTouch Delica Lancets Fine Misc  3 (three) times a day Test  Refills: 0            Allergies  No Known Allergies    Diet restrictions: none  Activity restrictions: none  Code Status: Level 1 - Full Code  Advance Directive and Living Will: <no information>  Power of :    POLST:      Discharge Condition: stable      Discharge  Statement   I spent 15 minutes discharging the patient  This time was spent on the day of discharge  I had direct contact with the patient on the day of discharge  Additional documentation is required if more than 30 minutes were spent on discharge

## 2021-06-05 NOTE — PROGRESS NOTES
[address] [facility] [address] [facility] Practice Progress Note - Sujata Grew 63 y o  male MRN: [id number]    Unit/Bed#: 4 [place] 419-01 Encounter: [id number]    Summary: Patient is under observation status for chest pain and alcohol withdrawal  He is feeling well this AM  Plan to discharge today  F: none  E: magnesium low at 1 8  Repleted   N: diabetic diet   D: SQL    * Chest pain  Assessment & Plan  History of CAD s/p CABG presented with chest pain  Symptoms had resolved at the time of evaluation  EKG:  Normal sinus rhythm   Troponins< 02     Telemetry  ASA 81  Continue to monitor symptoms    Transaminitis  Assessment & Plan  6/5/2021: AST 99, ALT 57, Alk phos 119  Likely secondary to alcohol abuse   Hold statin during admission with plan to restart on discharge given patient's significant cardiac history  History of Atrial fibrillation [facility])  Assessment & Plan  Telemetry- patient has been in NSR since arrival   Monitor vitals  Continue home medication metoprolol 25 mg  [id number] score 3 however patient has HAS-BLED score of 2 (elevated liver enzymes and alcohol abuse)  Patient been off anticoagulation because of history of multiple falls with his alcohol abuse that puts him at increased risk for potentially fatal bleed  Diastolic heart failure (HCC)  Assessment & Plan  Wt Readings from Last 3 Encounters:   06/04/21 76 7 kg (169 lb)   05/13/21 68 3 kg (150 lb 9 2 oz)   04/25/21 84 kg (185 lb 3 oz)     EF 06-91% grade 1 diastolic failure in 50/4871  Continue metoprolol tartrate 25 b i d , lisinopril 10 mg daily  Weight is up 19 lbs from last admission however patient does not appear to be fluid overloaded on exam  Continue to monitor weights  Alcohol abuse  Assessment & Plan  Currently on [facility] protocol  EtOH ordered for this AM  Received once dose of Ativan in ED on 6/4  Continue Thiamine 100 mg po daily and folic acid 1 mg po daily  Nicotine abuse  Assessment & Plan  Nicotine patch      CAD (coronary artery disease)  Assessment & Plan  Coronary disease s/p CABG 2004, PCI to LMCA 2004  While recommendation was made initially for Plavix/Eliquis, not started given history of falls, chronic alcohol use and high risk of bleeding  Continue metoprolol 25 mg po BID, Ranexa 500 mg po BID, and amlodipine 5 mg po daily   Statin held because of acute transaminitis secondary to alcohol intoxication     History of prostate cancer  Assessment & Plan  S/p radiation and resection  Continue home tamsulosin    Dyslipidemia  Assessment & Plan  Current transaminitis  Continue to hold statin  Hx of CABG  Assessment & Plan  Continue home lisinopril, metoprolol, statin, ranolazine     Essential hypertension  Assessment & Plan  BP on admission:  131/72    Continue home medications amlodipine, lisinopril, metoprolol and monitor vital signs    Type 2 diabetes mellitus ([facility] [address] )  Assessment & Plan  Lab Results   Component Value Date    HGBA1C 5 3 12/07/2020       No results for input(s): POCGLU in the last 72 hours  Last hemoglobin A1c 5 3 patient currently controlled off diabetic medications  ISS    Alcohol withdrawal syndrome without complication [facility])  Assessment & Plan  Patient given Ativan IV 1 mg x1 in ED  CIWA protocol  Continue thiamine, folate         COPD (chronic obstructive pulmonary disease) ([facility])  Assessment & Plan  Stable    Continue fluticasone 1 puff daily   Albuterol nebulizer p r n  For wheezing      -------------------------------------------------------------------------------------  HPI/24hr events: observation status    Disposition: discharge today  Code Status: Level 1 - Full Code  ---------------------------------------------------------------------------------------  SUBJECTIVE  Patient seen and examined at bedside  He has no complaints  He notes that he has not had chest pain since yesterday  He denies signs of alcohol withdrawal  He notes he does not remember exactly how much alcohol he drank yesterday  Review of Systems   Constitutional: Negative for chills and fever  Respiratory: Negative for cough, chest tightness and shortness of breath  Cardiovascular: Negative for chest pain and palpitations  Gastrointestinal: Negative for diarrhea, nausea and vomiting  Neurological: Negative for dizziness, tremors, seizures and headaches  Psychiatric/Behavioral: Negative for agitation  The patient is not nervous/anxious         ---------------------------------------------------------------------------------------  OBJECTIVE    Vitals   Vitals:    21 2116 21 0025 21 0257 21 0907   BP:  122/66 131/71 131/71   BP Location:  Right arm Right arm    Pulse:  75 62 66   Resp:  20 18    Temp:  99 °F (37 2 °C) 98 8 °F (37 1 °C)    TempSrc:  Oral Oral    SpO2: 94% 94% 96% 96%   Weight:       Height:         Temp (24hrs), Av 8 °F (37 1 °C), Min:98 6 °F (37 °C), Max:99 °F (37 2 °C)  Current: Temperature: 98 8 °F (37 1 °C)          Respiratory:  SpO2: SpO2: 96 %       Physical Exam  Constitutional:       General: He is not in acute distress  Appearance: He is not toxic-appearing  HENT:      Head: Normocephalic and atraumatic  Mouth/Throat:      Mouth: Mucous membranes are moist    Cardiovascular:      Rate and Rhythm: Normal rate and regular rhythm  Pulses: Normal pulses  Heart sounds: No murmur  Pulmonary:      Effort: Pulmonary effort is normal       Breath sounds: Normal breath sounds  Abdominal:      General: Abdomen is flat  Bowel sounds are normal  There is no distension  Musculoskeletal:         General: No swelling or signs of injury  Right lower leg: No edema  Left lower leg: No edema  Skin:     General: Skin is warm and dry  Neurological:      General: No focal deficit present  Mental Status: He is alert and oriented to person, place, and time     Psychiatric:         Mood and Affect: Mood normal          Behavior: Behavior normal  Thought Content: Thought content normal          Judgment: Judgment normal          Laboratory and Diagnostics:  Results from last 7 days   Lab Units 06/05/21  0432 06/04/21  1836 06/04/21  1539   WBC Thousand/uL 4 14*  --  4 58   HEMOGLOBIN g/dL 11 1*  --  11 0*   HEMATOCRIT % 33 5*  --  32 1*   PLATELETS Thousands/uL 77* 84* 91*   NEUTROS PCT % 53  --  62   MONOS PCT % 13*  --  12     Results from last 7 days   Lab Units 06/05/21  0432 06/04/21  1836 06/04/21  1539   SODIUM mmol/L 135* 131* 134*   POTASSIUM mmol/L 4 4 4 6 4 5   CHLORIDE mmol/L 99* 97* 97*   CO2 mmol/L 28 25 26   ANION GAP mmol/L 8 9 11   BUN mg/dL 13 10 12   CREATININE mg/dL 1 13 1 13 1 12   CALCIUM mg/dL 8 7 8 5 8 7   GLUCOSE RANDOM mg/dL 94 115 95   ALT U/L 57 60 59   AST U/L 99* 142* 156*   ALK PHOS U/L 119* 122* 124*   ALBUMIN g/dL 3 1* 3 2* 3 3*   TOTAL BILIRUBIN mg/dL 0 51 0 55 0 46     Results from last 7 days   Lab Units 06/05/21  0432 06/04/21  1836 06/04/21  1559   MAGNESIUM mg/dL 1 8 2 4 1 5*   PHOSPHORUS mg/dL 4 2  --   --       Results from last 7 days   Lab Units 06/04/21  1836 06/04/21  1559   INR  0 97 0 95   PTT seconds 29 29      Results from last 7 days   Lab Units 06/04/21  1539   TROPONIN I ng/mL <0 02     EKG:NSR with regular rate   Imaging:   XR chest 1 view portable   Final Result      No acute cardiopulmonary disease  Workstation performed: [id number]               Intake and Output  I/O       06/03 0701 - 06/04 0700 06/04 0701 - 06/05 0700    IV Piggyback  50    Total Intake(mL/kg)  50 (0 7)    Urine (mL/kg/hr)  1100    Total Output  1100    Net  -1050                Height and Weights   Height: 6' 2" (188 cm)  IBW (Ideal Body Weight): 82 2 kg  Body mass index is 21 7 kg/m²    Weight (last 2 days)     Date/Time   Weight    06/04/21 2100   76 7 (169)                Nutrition       Diet Orders   (From admission, onward)             Start     Ordered    06/05/21 0851  Room Service  Once     Question:  Type of Service  Answer:  Room Service-Appropriate    06/05/21 3967    06/04/21 2116  Diet Vlad/CHO Controlled; Consistent Carbohydrate Diet Level 2 (5 carb servings/75 grams CHO/meal)  Diet effective now     Question Answer Comment   Diet Type Vlad/CHO Controlled    Vlad/CHO Controlled Consistent Carbohydrate Diet Level 2 (5 carb servings/75 grams CHO/meal)    RD to adjust diet per protocol? Yes        06/04/21 2115                  Active Medications  Scheduled Meds:  Current Facility-Administered Medications   Medication Dose Route Frequency Provider Last Rate    amLODIPine  5 mg Oral Daily Adamsmissy Bhagat, DO      enoxaparin  40 mg Subcutaneous Daily Adamsmissy Bhagat, DO      fluticasone-vilanterol  1 puff [facility]      folic acid  7,454 mcg Oral Daily [facility]      gabapentin  300 mg Oral TID Adams Leola, DO      insulin lispro  1-6 Units Subcutaneous 4x Daily (AC & HS) Maria Eugenia Moss MD      lisinopril  10 mg Oral Daily [facility]      magnesium oxide  400 mg Oral BID [facility], DO      metFORMIN  1,000 mg Oral BID With Meals [place], [place]      metoprolol tartrate  25 mg Oral Q12H [place], [place]      multivitamin-minerals  1 tablet Oral Daily [place], [place]      ondansetron  4 mg Intravenous Q6H PRN Adamsmissy LockhartLeola, DO      pantoprazole  40 mg Oral Early Morning [place], [place]      ranolazine  500 mg Oral Q12H [facility] [place], [place]      tamsulosin  0 4 mg Oral Daily [place], [place]      thiamine  100 mg Oral Daily Adams Bhagat DO       Continuous Infusions:     PRN Meds:   ondansetron, 4 mg, Q6H PRN        Invasive Devices Review  Invasive Devices     Peripheral Intravenous Line            Peripheral IV 06/04/21 Left Antecubital less than 1 day                    Lindy Camarena DO      Portions of the record may have been created with voice recognition software    Occasional wrong word or "sound a like" substitutions may have occurred due to the inherent limitations of voice recognition software    Read the chart carefully and recognize, using context, where substitutions have occurred

## 2021-06-05 NOTE — ASSESSMENT & PLAN NOTE
· 6/5/2021: AST 99, ALT 57, Alk phos 119  · Likely secondary to alcohol abuse   · Hold statin during admission with plan to restart on discharge given patient's significant cardiac history

## 2021-06-06 ENCOUNTER — HOSPITAL ENCOUNTER (EMERGENCY)
Facility: HOSPITAL | Age: 61
Discharge: HOME | End: 2021-06-06
Attending: GENERAL PRACTICE
Payer: MEDICARE

## 2021-06-06 VITALS
BODY MASS INDEX: 21.43 KG/M2 | RESPIRATION RATE: 20 BRPM | TEMPERATURE: 97.9 F | WEIGHT: 166.89 LBS | SYSTOLIC BLOOD PRESSURE: 118 MMHG | HEART RATE: 88 BPM | DIASTOLIC BLOOD PRESSURE: 66 MMHG | OXYGEN SATURATION: 96 %

## 2021-06-06 DIAGNOSIS — F10.920 ALCOHOLIC INTOXICATION WITHOUT COMPLICATION (HCC): Primary | ICD-10-CM

## 2021-06-06 LAB — GLUCOSE SERPL-MCNC: 109 MG/DL (ref 65–140)

## 2021-06-06 PROCEDURE — 96372 THER/PROPH/DIAG INJ SC/IM: CPT

## 2021-06-06 PROCEDURE — 99284 EMERGENCY DEPT VISIT MOD MDM: CPT | Performed by: GENERAL PRACTICE

## 2021-06-06 PROCEDURE — 99284 EMERGENCY DEPT VISIT MOD MDM: CPT

## 2021-06-06 PROCEDURE — 82948 REAGENT STRIP/BLOOD GLUCOSE: CPT

## 2021-06-06 RX ORDER — HALOPERIDOL 5 MG/ML
5 INJECTION INTRAMUSCULAR ONCE
Status: COMPLETED | OUTPATIENT
Start: 2021-06-06 | End: 2021-06-06

## 2021-06-06 RX ORDER — LORAZEPAM 2 MG/ML
2 INJECTION INTRAMUSCULAR ONCE
Status: DISCONTINUED | OUTPATIENT
Start: 2021-06-06 | End: 2021-06-06 | Stop reason: HOSPADM

## 2021-06-06 RX ADMIN — HALOPERIDOL LACTATE 5 MG: 5 INJECTION, SOLUTION INTRAMUSCULAR at 11:49

## 2021-06-06 NOTE — ASSESSMENT & PLAN NOTE
· Continue Solu-Medrol 60 mg b i d   · Continue Breo Ellipta 1 puff q 12hr  · Continue DuoNeb Q 6 p r n

## 2021-06-06 NOTE — ASSESSMENT & PLAN NOTE
· Telemetry- patient has been in NSR since arrival   · Monitor vitals  · Continue home medication metoprolol 25 mg  · [id number] score 3 however patient has HAS-BLED score of 2 (elevated liver enzymes and alcohol abuse)  Patient been off anticoagulation because of history of multiple falls with his alcohol abuse that puts him at increased risk for potentially fatal bleed

## 2021-06-06 NOTE — ASSESSMENT & PLAN NOTE
· Coronary artery disease status post CABG 2004, PCA to LMCA 2004  · Recommendations for Eliquis was made, however patient was not started on Eliquis 2/2 history of falls, chronic alcohol use and I risk of bleeding

## 2021-06-06 NOTE — ED PROVIDER NOTES
History  Chief Complaint   Patient presents with    Alcohol Intoxication     Per registration, patient was discharged and came to get his personal meds from pharmacy, registration went to pharmacy to get meds and then patient stated he wanted " shots " of meds  Patient in waiting room in wheelchair stating he wants phone to order lunch from dietary  Patient with slurred speech and almost falls getting onto stretcher, he states he drank a pint of vodka  Patient is a 81-year-old with a past medical history of hypertension, hyperlipidemia, COPD, coronary artery disease, alcohol dependence and abuse who presents to the emergency department with acute alcohol intoxication  Patient was admitted 2 days ago for chest pain and was discharged home yesterday  He came back to the hospital today because he states he never got his medications and was therefore not compliant last night and again this morning  When patient arrived to the hospital he was stumbling and clearly acutely intoxicated  Patient admits to drinking 1 pint of vodka this morning  Insisting I am just here for my medications    He has no other complaints  Alcohol Intoxication      Prior to Admission Medications   Prescriptions Last Dose Informant Patient Reported? Taking?    Blood Glucose Monitoring Suppl (ONE TOUCH ULTRA MINI) w/Device KIT  Self Yes No   Sig: Use as directed   Breo Ellipta 200-25 MCG/INH inhaler   No No   Sig: INHALE 1 PUFF EVERY 12 HOURS   ONETOUCH DELICA LANCETS FINE MISC  Self Yes No   Sig: 3 (three) times a day Test   Ventolin  (90 Base) MCG/ACT inhaler   No No   Sig: INHALE 2 PUFFS EVERY 4 HOURS AS NEEDED FOR WHEEZING   Patient not taking: Reported on 6/4/2021   amLODIPine (NORVASC) 5 mg tablet   No No   Sig: TAKE 1 TABLET BY MOUTH EVERY DAY   fluticasone-salmeterol (ADVAIR, WIXELA) 500-50 mcg/dose inhaler   No No   Sig: Inhale 1 puff every 12 (twelve) hours   Patient not taking: Reported on 2/5/4864   folic acid (FOLVITE) 1 mg tablet   No No   Sig: TAKE 1 TABLET BY MOUTH EVERY DAY   gabapentin (NEURONTIN) 300 mg capsule   No No   Sig: Take 1 capsule (300 mg total) by mouth 3 (three) times a day   lisinopril (ZESTRIL) 10 mg tablet   No No   Sig: Take 1 tablet (10 mg total) by mouth daily   magnesium oxide (MAG-OX) 400 mg   No No   Sig: TAKE 1 TABLET BY MOUTH TWICE A DAY   metFORMIN (GLUCOPHAGE) 1000 MG tablet  Self Yes No   Sig: Take 1,000 mg by mouth 2 (two) times a day with meals   metoprolol tartrate (LOPRESSOR) 25 mg tablet   No No   Sig: TAKE 1 TABLET BY MOUTH EVERY 12 HOURS   pravastatin (PRAVACHOL) 40 mg tablet   No No   Sig: TAKE 1 TABLET BY MOUTH EVERY DAY WITH DINNER   ranolazine (RANEXA) 500 mg 12 hr tablet   No No   Sig: Take 1 tablet (500 mg total) by mouth every 12 (twelve) hours   tamsulosin (FLOMAX) 0 4 mg   Yes No   Sig: Take 0 4 mg by mouth daily   thiamine 100 MG tablet   No No   Sig: TAKE 1 TABLET BY MOUTH EVERY DAY      Facility-Administered Medications: None       Past Medical History:   Diagnosis Date    Cancer [facility])     prostate ca,had radiation    Cardiac disease     stents,then triple bypass    COPD (chronic obstructive [facility] disease) [facility]     Diabetes mellitus ([facility] )     ETOH abuse     Hx of heart artery stent     2014    Hyperlipidemia     Hypertension     Pneumonia     Prostate CA ([facility] )     Renal disorder     pyelonephritis    S/P CABG x 1     2004       Past Surgical History:   Procedure Laterality Date    CORONARY ARTERY BYPASS GRAFT  2004    AL ARTHRODESIS ANT INTERBODY MIN DISCECTOMY, CERVICAL BELOW C2 N/A 12/16/2020    Procedure: Anterior cervical discectomy with fusion C4-C7;  Posterior cervical decompression and fusion C2-T2;  Surgeon: Jeannie Redd MD;  [facility];  Service: Neurosurgery    TONSILLECTOMY         Family History   Problem Relation Age of Onset    Diabetes Mother     Uterine cancer Mother     COPD Father     Hypertension Father      I have reviewed and agree with the history as documented  E-Cigarette/Vaping    E-Cigarette Use Never User      E-Cigarette/Vaping Substances    Nicotine Yes     THC No     CBD No     Flavoring No     Other No     Unknown No      Social History     Tobacco Use    Smoking status: Current Every Day Smoker     Packs/day: 1 50     Years: 40 00     Pack years: 60 00    Smokeless tobacco: Never Used   Substance Use Topics    Alcohol use: Yes     Alcohol/week: 10 0 standard drinks     Types: 10 Shots of liquor per week     Frequency: 4 or more times a week     Drinks per session: 10 or more     Binge frequency: Daily or almost daily     Comment: drinking a 5th of vodka daily    Drug use: No       Review of Systems   Unable to perform ROS: Other (acutely intoxicated)       Physical Exam  Physical Exam  Constitutional:       General: He is not in acute distress  Appearance: Normal appearance  He is not ill-appearing  Comments: Slurred speech, unsteady gait, unable to attend a conversation  Belligerent, argumentative, and uncooperative  HENT:      Head: Normocephalic and atraumatic  Mouth/Throat:      Mouth: Mucous membranes are moist       Pharynx: Oropharynx is clear  Eyes:      General: No scleral icterus  Conjunctiva/sclera: Conjunctivae normal    Neck:      Musculoskeletal: Normal range of motion and neck supple  Cardiovascular:      Rate and Rhythm: Normal rate and regular rhythm  Pulses: Normal pulses  Heart sounds: No murmur  No friction rub  No gallop  Pulmonary:      Effort: Pulmonary effort is normal  No respiratory distress  Breath sounds: Normal breath sounds  No wheezing, rhonchi or rales  Abdominal:      Palpations: Abdomen is soft  Tenderness: There is no abdominal tenderness  Musculoskeletal: Normal range of motion  General: No swelling or tenderness  Skin:     General: Skin is warm and dry        Capillary Refill: Capillary refill takes less than 2 seconds  Neurological:      General: No focal deficit present  Mental Status: He is alert  Vital Signs  ED Triage Vitals [06/06/21 1110]   Temperature Pulse Respirations Blood Pressure SpO2   (!) 97 1 °F (36 2 °C) 76 20 96/51 94 %      Temp Source Heart Rate Source Patient Position - Orthostatic VS BP Location FiO2 (%)   Tympanic Monitor Lying Right arm --      Pain Score       9           Vitals:    06/06/21 1110   BP: 96/51   Pulse: 76   Patient Position - Orthostatic VS: Lying         Visual Acuity      ED Medications  Medications   LORazepam (ATIVAN) injection 2 mg (has no administration in time range)   haloperidol lactate (HALDOL) injection 5 mg (5 mg Intramuscular Given 6/6/21 1149)       Diagnostic Studies  Results Reviewed     Procedure Component Value Units Date/Time    Fingerstick Glucose (POCT) [[id number]]  (Normal) Collected: 06/06/21 1121    Lab Status: Final result Updated: 06/06/21 1123     POC Glucose 109 mg/dl                  No orders to display              Procedures  Procedures         ED Course  ED Course as of Jun 06 1930   Michael Cain Jun 06, 2021   1341 Patient beligerent, unable to be redirected, and getting out of bed  Stumbling with severely unsteady gait  Medicated with haldol 5mg IM  On re-evaluation, patient still attempting to get out of bed  Medicated with 2mg ativan IM  Now sleeping  [id number] Patient is pending clinical sobriety  1616 Patient re-evaluated  Sleeping comfortably  1918 Patient re-evaluated  Awake and cooperative  Tolerating PO        1928 Patient ambulating with a steady gait  Able to attend a conversation  He will get home by cab  Will discharge in stable condition                                                 MDM    Disposition  Final diagnoses:   Alcoholic intoxication without complication ([facility] )     Time reflects when diagnosis was documented in both MDM as applicable and the Disposition within this note     Time User Action Codes Description Comment    6/6/2021  7:29 PM Pat Contreras Add [J20 [id number]] Alcoholic intoxication without complication [facility])       ED Disposition     ED Disposition Condition Date/Time Comment    Discharge Stable Sun Jun 6, 2021  7:29 PM Bucky Dupree discharge to home/self care  Follow-up Information     Follow up With Specialties Details Why Contact Info Additional Information    [address] [facility]  As needed [address] [address] Emergency Department, [place], [place], [address]          Patient's Medications   Discharge Prescriptions    No medications on file     No discharge procedures on file      PDMP Review       Value Time User    PDMP Reviewed  Yes 12/21/2020  3:08 PM Marcia Dawson PA-C          ED Provider  Electronically Signed by           Hector James MD  06/06/21 5316

## 2021-06-06 NOTE — ASSESSMENT & PLAN NOTE
· Continue amlodipine 5 mg p o  Q d  · Continue lisinopril 10 mg p o  q d     · Continue metoprolol 25 mg p o  q 12  · Continue monitor vital signs

## 2021-06-06 NOTE — ASSESSMENT & PLAN NOTE
Wt Readings from Last 3 Encounters:   06/06/21 75 7 kg (166 lb 14 2 oz)   06/04/21 76 7 kg (169 lb)   05/13/21 68 3 kg (150 lb 9 2 oz)     · Echocardiogram 12/2021 should EF of 05-88%, grade 1 diastolic dysfunction  · Continue Lopressor 25 mg b i d   · Continue lisinopril 10 mg q d    · Daily weights  · Continue to monitor I&O

## 2021-06-06 NOTE — ED NOTES
Pt seen, assessed and d/c by provider  Pt appeared to be in no acute distress upon discharge  Pt able to ambulate well without assistance upon exiting        David Perez RN  06/06/21 6247

## 2021-06-06 NOTE — ASSESSMENT & PLAN NOTE
Lab Results   Component Value Date    HGBA1C 5 3 12/07/2020   · Stable  · Currently off diabetic medications  · Continue to monitor

## 2021-06-07 ENCOUNTER — TRANSITIONAL CARE MANAGEMENT (OUTPATIENT)
Dept: FAMILY MEDICINE CLINIC | Facility: CLINIC | Age: 61
End: 2021-06-07

## 2021-06-07 LAB
ATRIAL RATE: 83 BPM
P AXIS: 81 DEGREES
PR INTERVAL: 126 MS
QRS AXIS: 72 DEGREES
QRSD INTERVAL: 86 MS
QT INTERVAL: 372 MS
QTC INTERVAL: 437 MS
T WAVE AXIS: 63 DEGREES
VENTRICULAR RATE: 83 BPM

## 2021-06-07 PROCEDURE — 93010 ELECTROCARDIOGRAM REPORT: CPT | Performed by: INTERNAL MEDICINE

## 2021-06-08 ENCOUNTER — TELEPHONE (OUTPATIENT)
Dept: INPATIENT UNIT | Facility: HOSPITAL | Age: 61
End: 2021-06-08

## 2021-06-11 ENCOUNTER — PATIENT OUTREACH (OUTPATIENT)
Dept: FAMILY MEDICINE CLINIC | Facility: CLINIC | Age: 61
End: 2021-06-11

## 2021-06-11 ENCOUNTER — TELEPHONE (OUTPATIENT)
Dept: INPATIENT UNIT | Facility: HOSPITAL | Age: 61
End: 2021-06-11

## 2021-06-11 NOTE — PROGRESS NOTES
MERT RAMÍREZ planned to meet with patient at appointment today at 8:20 AM and to review D & A resources and provide any needed support  However, patient did not show for TCM  appointment today  MERT RAMÍREZ attempted to call patient ([phone])  However, patient did not answer, MERT RAMÍREZ left a message and will continue to follow up regarding

## 2021-06-14 ENCOUNTER — HOSPITAL ENCOUNTER (OUTPATIENT)
Dept: RADIOLOGY | Facility: HOSPITAL | Age: 61
Discharge: HOME | End: 2021-06-14
Attending: NEUROLOGICAL SURGERY
Payer: MEDICARE

## 2021-06-14 ENCOUNTER — IMMUNIZATIONS (OUTPATIENT)
Dept: FAMILY MEDICINE CLINIC | Facility: HOSPITAL | Age: 61
End: 2021-06-14
Payer: MEDICARE

## 2021-06-14 ENCOUNTER — PATIENT OUTREACH (OUTPATIENT)
Dept: FAMILY MEDICINE CLINIC | Facility: CLINIC | Age: 61
End: 2021-06-14

## 2021-06-14 DIAGNOSIS — Z23 ENCOUNTER FOR IMMUNIZATION: Primary | ICD-10-CM

## 2021-06-14 DIAGNOSIS — G95.19 EDEMA, SPINAL CORD (HCC): ICD-10-CM

## 2021-06-14 PROCEDURE — G1004 CDSM NDSC: HCPCS

## 2021-06-14 PROCEDURE — 0012A SARS-COV-2 / COVID-19 MRNA VACCINE (MODERNA) 100 MCG: CPT

## 2021-06-14 PROCEDURE — 72125 CT NECK SPINE W/O DYE: CPT

## 2021-06-14 PROCEDURE — 91301 SARS-COV-2 / COVID-19 MRNA VACCINE (MODERNA) 100 MCG: CPT

## 2021-06-14 NOTE — PROGRESS NOTES
MERT RAMÍREZ received returned call from patient ([phone])  Patient confirmed that he talks to Dwight Tao from [employer] program multiple times a week  Patient stated that he will be speaking with Dwight Tao today  Patient stated he will be exploring D&A resources and options with Dwight Tao  Patient had no other questions, concerns or needs  MERT RAMÍREZ provided patient with MERT RAMÍREZ contact information  MERT RAMÍREZ encouraged patient to call MERT RAMÍREZ as needed  Please re consult MERT RAMÍREZ as needed

## 2021-06-14 NOTE — PROGRESS NOTES
Spoke with patient regarding follow up care  Pt states he is at the hospital  Just received his second covid vaccine  Questions regarding need for blood work  Advised that he did not have any blood work pending, however he did have a CT scan at noon  Advised patient to stay at hospital for CT  Advised patient that he did have an upcoming appointment with neurology in [place]  Discussed need for follow up appointment at [facility]  Pt relates that his sleeping habits are off and is unable to make it to his appointments  Offered assistance in scheduling an appointment when he is ready and feels he can make it  Pt states he is taking an antibiotic for an intestinal infection  Most recent AVS reviewed  Antibiotic not noted on AVS  Advised patient to call CM when he arrives home so that all medications can be reviewed  Pt in agreement

## 2021-06-17 ENCOUNTER — TELEPHONE (OUTPATIENT)
Dept: INPATIENT UNIT | Facility: HOSPITAL | Age: 61
End: 2021-06-17

## 2021-06-17 ENCOUNTER — OFFICE VISIT (OUTPATIENT)
Dept: NEUROSURGERY | Facility: CLINIC | Age: 61
End: 2021-06-17
Payer: MEDICARE

## 2021-06-17 VITALS
TEMPERATURE: 98 F | DIASTOLIC BLOOD PRESSURE: 66 MMHG | RESPIRATION RATE: 16 BRPM | HEART RATE: 60 BPM | SYSTOLIC BLOOD PRESSURE: 104 MMHG | WEIGHT: 166 LBS | BODY MASS INDEX: 21.3 KG/M2 | HEIGHT: 74 IN

## 2021-06-17 DIAGNOSIS — Z98.1 S/P CERVICAL SPINAL FUSION: Primary | ICD-10-CM

## 2021-06-17 PROCEDURE — 99213 OFFICE O/P EST LOW 20 MIN: CPT | Performed by: NEUROLOGICAL SURGERY

## 2021-06-17 NOTE — PROGRESS NOTES
Assessment/Plan:    No problem-specific Assessment & Plan notes found for this encounter  Patient is several months from a 360 cervical fusion  CT reviewed appears fused  Patient doing well has some residual symptoms  F/u PRN at this point     Diagnoses and all orders for this visit:    S/P cervical spinal fusion        I have spent 20 minutes with Patient  today in which greater than 50% of this time was spent in counseling/coordination of care regarding Diagnostic results, Prognosis, Risks and benefits of tx options, Intructions for management, Patient and family education, Importance of tx compliance, Risk factor reductions and Impressions  Subjective:      Patient ID: Sotero Alvarez is a 63 y o  male  HPI    Patient is several months from fusion anterior posterior for cervical central cord syndrome improved sx  Still some residual numbness  Otherwise no major complaints  The following portions of the patient's history were reviewed and updated as appropriate: allergies, current medications, past family history, past medical history, past social history, past surgical history and problem list     Review of Systems   Cardiovascular: Negative  Gastrointestinal: Negative  Genitourinary: Negative  Musculoskeletal: Positive for neck stiffness  Neurological: Positive for dizziness and numbness (bi/hands improving)  Hematological: Negative  Psychiatric/Behavioral: Negative  I have personally reviewed all aspects of the review of systems as documented    Objective:      /66 (BP Location: Left arm, Patient Position: Sitting)   Pulse 60   Temp 98 °F (36 7 °C) (Tympanic)   Resp 16   Ht 6' 2" (1 88 m)   Wt 75 3 kg (166 lb)   BMI 21 31 kg/m²          Physical Exam  Constitutional:       Appearance: Normal appearance  He is normal weight  HENT:      Head: Normocephalic and atraumatic  Eyes:      Extraocular Movements: Extraocular movements intact  Conjunctiva/sclera: Conjunctivae normal       Pupils: Pupils are equal, round, and reactive to light  Cardiovascular:      Rate and Rhythm: Normal rate  Pulmonary:      Effort: Pulmonary effort is normal    Musculoskeletal:         General: Normal range of motion  Cervical back: Normal range of motion  Neurological:      General: No focal deficit present  Mental Status: He is alert and oriented to person, place, and time  Mental status is at baseline  Psychiatric:         Mood and Affect: Mood normal          Thought Content:  Thought content normal

## 2021-06-22 ENCOUNTER — PATIENT OUTREACH (OUTPATIENT)
Dept: FAMILY MEDICINE CLINIC | Facility: CLINIC | Age: 61
End: 2021-06-22

## 2021-06-22 NOTE — PROGRESS NOTES
Received phone call from patient  Pt calling to find out when he has an appointment  Information regarding upcoming appointment provided  Questions regarding drinking  Pt states he was recently drinking because it was his sons birthday ( son is )  States he hasnt heard from Winston Mcmillan at [facility] program States he will reach out to her today  CM will follow up with patient at his appointment on

## 2021-06-25 ENCOUNTER — TELEPHONE (OUTPATIENT)
Dept: INPATIENT UNIT | Facility: HOSPITAL | Age: 61
End: 2021-06-25

## 2021-06-25 ENCOUNTER — OFFICE VISIT (OUTPATIENT)
Dept: FAMILY MEDICINE CLINIC | Facility: CLINIC | Age: 61
End: 2021-06-25
Payer: MEDICARE

## 2021-06-25 ENCOUNTER — PATIENT OUTREACH (OUTPATIENT)
Dept: FAMILY MEDICINE CLINIC | Facility: CLINIC | Age: 61
End: 2021-06-25

## 2021-06-25 ENCOUNTER — HOSPITAL ENCOUNTER (EMERGENCY)
Facility: HOSPITAL | Age: 61
Discharge: HOME | End: 2021-06-25
Attending: EMERGENCY MEDICINE | Admitting: EMERGENCY MEDICINE
Payer: MEDICARE

## 2021-06-25 VITALS
HEART RATE: 90 BPM | TEMPERATURE: 98 F | RESPIRATION RATE: 16 BRPM | OXYGEN SATURATION: 95 % | SYSTOLIC BLOOD PRESSURE: 110 MMHG | DIASTOLIC BLOOD PRESSURE: 52 MMHG

## 2021-06-25 VITALS
HEART RATE: 77 BPM | SYSTOLIC BLOOD PRESSURE: 136 MMHG | RESPIRATION RATE: 22 BRPM | BODY MASS INDEX: 23.68 KG/M2 | TEMPERATURE: 98.1 F | DIASTOLIC BLOOD PRESSURE: 74 MMHG | HEIGHT: 74 IN | OXYGEN SATURATION: 97 % | WEIGHT: 184.5 LBS

## 2021-06-25 DIAGNOSIS — K21.9 GASTROESOPHAGEAL REFLUX DISEASE, UNSPECIFIED WHETHER ESOPHAGITIS PRESENT: ICD-10-CM

## 2021-06-25 DIAGNOSIS — I48.91 ATRIAL FIBRILLATION, UNSPECIFIED TYPE (HCC): Primary | ICD-10-CM

## 2021-06-25 DIAGNOSIS — F10.929 ALCOHOL INTOXICATION (HCC): ICD-10-CM

## 2021-06-25 DIAGNOSIS — R42 DIZZINESS: ICD-10-CM

## 2021-06-25 DIAGNOSIS — E86.0 DEHYDRATION: ICD-10-CM

## 2021-06-25 DIAGNOSIS — I95.9 HYPOTENSION: Primary | ICD-10-CM

## 2021-06-25 DIAGNOSIS — I25.119 CORONARY ARTERY DISEASE WITH ANGINA PECTORIS, UNSPECIFIED VESSEL OR LESION TYPE, UNSPECIFIED WHETHER NATIVE OR TRANSPLANTED HEART (HCC): ICD-10-CM

## 2021-06-25 LAB
ALBUMIN SERPL BCP-MCNC: 3.3 G/DL (ref 3.5–5)
ALP SERPL-CCNC: 88 U/L (ref 46–116)
ALT SERPL W P-5'-P-CCNC: 29 U/L (ref 12–78)
ANION GAP SERPL CALCULATED.3IONS-SCNC: 16 MMOL/L (ref 4–13)
APTT PPP: 30 SECONDS (ref 23–37)
AST SERPL W P-5'-P-CCNC: 36 U/L (ref 5–45)
BASOPHILS # BLD AUTO: 0.09 THOUSANDS/ΜL (ref 0–0.1)
BASOPHILS NFR BLD AUTO: 2 % (ref 0–1)
BILIRUB SERPL-MCNC: 0.36 MG/DL (ref 0.2–1)
BILIRUB UR QL STRIP: ABNORMAL
BUN SERPL-MCNC: 19 MG/DL (ref 5–25)
CALCIUM ALBUM COR SERPL-MCNC: 8.9 MG/DL (ref 8.3–10.1)
CALCIUM SERPL-MCNC: 8.3 MG/DL (ref 8.3–10.1)
CHLORIDE SERPL-SCNC: 92 MMOL/L (ref 100–108)
CLARITY UR: CLEAR
CO2 SERPL-SCNC: 22 MMOL/L (ref 21–32)
COLOR UR: ABNORMAL
CREAT SERPL-MCNC: 1.46 MG/DL (ref 0.6–1.3)
EOSINOPHIL # BLD AUTO: 0.1 THOUSAND/ΜL (ref 0–0.61)
EOSINOPHIL NFR BLD AUTO: 2 % (ref 0–6)
ERYTHROCYTE [DISTWIDTH] IN BLOOD BY AUTOMATED COUNT: 14 % (ref 11.6–15.1)
ETHANOL SERPL-MCNC: 78 MG/DL (ref 0–3)
GFR SERPL CREATININE-BSD FRML MDRD: 52 ML/MIN/1.73SQ M
GLUCOSE SERPL-MCNC: 97 MG/DL (ref 65–140)
GLUCOSE UR STRIP-MCNC: NEGATIVE MG/DL
HCT VFR BLD AUTO: 32.7 % (ref 36.5–49.3)
HGB BLD-MCNC: 11 G/DL (ref 12–17)
HGB UR QL STRIP.AUTO: NEGATIVE
IMM GRANULOCYTES # BLD AUTO: 0.02 THOUSAND/UL (ref 0–0.2)
IMM GRANULOCYTES NFR BLD AUTO: 0 % (ref 0–2)
INR PPP: 1.04 (ref 0.84–1.19)
KETONES UR STRIP-MCNC: NEGATIVE MG/DL
LEUKOCYTE ESTERASE UR QL STRIP: NEGATIVE
LYMPHOCYTES # BLD AUTO: 1.4 THOUSANDS/ΜL (ref 0.6–4.47)
LYMPHOCYTES NFR BLD AUTO: 24 % (ref 14–44)
MCH RBC QN AUTO: 33.3 PG (ref 26.8–34.3)
MCHC RBC AUTO-ENTMCNC: 33.6 G/DL (ref 31.4–37.4)
MCV RBC AUTO: 99 FL (ref 82–98)
MONOCYTES # BLD AUTO: 0.69 THOUSAND/ΜL (ref 0.17–1.22)
MONOCYTES NFR BLD AUTO: 12 % (ref 4–12)
NEUTROPHILS # BLD AUTO: 3.65 THOUSANDS/ΜL (ref 1.85–7.62)
NEUTS SEG NFR BLD AUTO: 60 % (ref 43–75)
NITRITE UR QL STRIP: NEGATIVE
NRBC BLD AUTO-RTO: 0 /100 WBCS
PH UR STRIP.AUTO: 5.5 [PH]
PLATELET # BLD AUTO: 134 THOUSANDS/UL (ref 149–390)
PMV BLD AUTO: 10.5 FL (ref 8.9–12.7)
POTASSIUM SERPL-SCNC: 4.7 MMOL/L (ref 3.5–5.3)
PROT SERPL-MCNC: 7 G/DL (ref 6.4–8.2)
PROT UR STRIP-MCNC: NEGATIVE MG/DL
PROTHROMBIN TIME: 13.5 SECONDS (ref 11.6–14.5)
RBC # BLD AUTO: 3.3 MILLION/UL (ref 3.88–5.62)
SODIUM SERPL-SCNC: 130 MMOL/L (ref 136–145)
SP GR UR STRIP.AUTO: 1.02 (ref 1–1.03)
TROPONIN I SERPL-MCNC: <0.02 NG/ML
UROBILINOGEN UR QL STRIP.AUTO: 0.2 E.U./DL
WBC # BLD AUTO: 5.95 THOUSAND/UL (ref 4.31–10.16)

## 2021-06-25 PROCEDURE — 99285 EMERGENCY DEPT VISIT HI MDM: CPT

## 2021-06-25 PROCEDURE — 80053 COMPREHEN METABOLIC PANEL: CPT | Performed by: EMERGENCY MEDICINE

## 2021-06-25 PROCEDURE — 99285 EMERGENCY DEPT VISIT HI MDM: CPT | Performed by: EMERGENCY MEDICINE

## 2021-06-25 PROCEDURE — 96360 HYDRATION IV INFUSION INIT: CPT

## 2021-06-25 PROCEDURE — 93005 ELECTROCARDIOGRAM TRACING: CPT

## 2021-06-25 PROCEDURE — 85025 COMPLETE CBC W/AUTO DIFF WBC: CPT | Performed by: EMERGENCY MEDICINE

## 2021-06-25 PROCEDURE — 85730 THROMBOPLASTIN TIME PARTIAL: CPT | Performed by: EMERGENCY MEDICINE

## 2021-06-25 PROCEDURE — 96361 HYDRATE IV INFUSION ADD-ON: CPT

## 2021-06-25 PROCEDURE — 85610 PROTHROMBIN TIME: CPT | Performed by: EMERGENCY MEDICINE

## 2021-06-25 PROCEDURE — 84484 ASSAY OF TROPONIN QUANT: CPT | Performed by: EMERGENCY MEDICINE

## 2021-06-25 PROCEDURE — 82077 ASSAY SPEC XCP UR&BREATH IA: CPT | Performed by: EMERGENCY MEDICINE

## 2021-06-25 PROCEDURE — 36415 COLL VENOUS BLD VENIPUNCTURE: CPT | Performed by: EMERGENCY MEDICINE

## 2021-06-25 PROCEDURE — 99213 OFFICE O/P EST LOW 20 MIN: CPT | Performed by: FAMILY MEDICINE

## 2021-06-25 PROCEDURE — 81003 URINALYSIS AUTO W/O SCOPE: CPT | Performed by: EMERGENCY MEDICINE

## 2021-06-25 RX ORDER — OMEPRAZOLE 20 MG/1
20 CAPSULE, DELAYED RELEASE ORAL DAILY
Qty: 90 CAPSULE | Refills: 3 | Status: SHIPPED | OUTPATIENT
Start: 2021-06-25 | End: 2022-06-09 | Stop reason: SDUPTHER

## 2021-06-25 RX ADMIN — SODIUM CHLORIDE 1000 ML: 0.9 INJECTION, SOLUTION INTRAVENOUS at 16:50

## 2021-06-25 NOTE — ED PROVIDER NOTES
History  Chief Complaint   Patient presents with    Hypotension     sent from [address] [place] [address] for hypotension was 78/45 in office, ems bp 88/60  c/o lightheaded and dizzyness in office  did have two vodka drinks this am  pt has been accidentally doubling renexa dose for about one week      62 yo male sent from [facility]  He had appointment there earlier and then left and went to get a slice of pizza  He then returned and was sitting outside on bench waiting for cab and had to go to the bathroom  He went back inside office and they noted him to look shakey  He said he felt dizzy and weak in his legs  SBP in office at that time was in the 70s  He admits to drinking alcohol earlier today  No vomiting, chest pain   + recent diarrhea  No syncope, fall, headache  He thinks he was taking double his dose of Ranolazine for last few days  History provided by:  Patient   used: No        Prior to Admission Medications   Prescriptions Last Dose Informant Patient Reported? Taking?    Blood Glucose Monitoring Suppl (ONE TOUCH ULTRA MINI) w/Device KIT  Self Yes No   Sig: Use as directed   Breo Ellipta 200-25 MCG/INH inhaler 6/25/2021 at Unknown time  No Yes   Sig: INHALE 1 PUFF EVERY 12 HOURS   ONETOUCH DELICA LANCETS FINE MISC  Self Yes No   Sig: 3 (three) times a day Test   Ventolin  (90 Base) MCG/ACT inhaler   No No   Sig: INHALE 2 PUFFS EVERY 4 HOURS AS NEEDED FOR WHEEZING   amLODIPine (NORVASC) 5 mg tablet 6/25/2021 at Unknown time  No Yes   Sig: TAKE 1 TABLET BY MOUTH EVERY DAY   fluticasone-salmeterol (ADVAIR, WIXELA) 500-50 mcg/dose inhaler Not Taking at Unknown time  No No   Sig: Inhale 1 puff every 12 (twelve) hours   Patient not taking: Reported on 4/5/9109   folic acid (FOLVITE) 1 mg tablet 6/25/2021 at Unknown time  No Yes   Sig: TAKE 1 TABLET BY MOUTH EVERY DAY   gabapentin (NEURONTIN) 300 mg capsule 6/25/2021 at Unknown time  No Yes   Sig: Take 1 capsule (300 mg total) by mouth 3 (three) times a day   lisinopril (ZESTRIL) 10 mg tablet 6/25/2021 at Unknown time  No Yes   Sig: TAKE 1 TABLET BY MOUTH EVERY DAY   magnesium oxide (MAG-OX) 400 mg 6/25/2021 at Unknown time  No Yes   Sig: TAKE 1 TABLET BY MOUTH TWICE A DAY   metFORMIN (GLUCOPHAGE) 1000 MG tablet 6/25/2021 at Unknown time  No Yes   Sig: TAKE 1 TABLET BY MOUTH EVERY 12 HOURS   metoprolol tartrate (LOPRESSOR) 25 mg tablet 6/25/2021 at Unknown time  No Yes   Sig: TAKE 1 TABLET BY MOUTH EVERY 12 HOURS   omeprazole (PriLOSEC) 20 mg delayed release capsule 6/25/2021 at Unknown time  No Yes   Sig: Take 1 capsule (20 mg total) by mouth daily   pravastatin (PRAVACHOL) 40 mg tablet 6/24/2021 at Unknown time  No Yes   Sig: TAKE 1 TABLET BY MOUTH EVERY DAY WITH DINNER   ranolazine (RANEXA) 500 mg 12 hr tablet 6/25/2021 at Unknown time  No Yes   Sig: Take 1 tablet (500 mg total) by mouth every 12 (twelve) hours   tamsulosin (FLOMAX) 0 4 mg 6/25/2021 at Unknown time  Yes Yes   Sig: Take 0 4 mg by mouth daily   thiamine 100 MG tablet 6/25/2021 at Unknown time  No Yes   Sig: TAKE 1 TABLET BY MOUTH EVERY DAY      Facility-Administered Medications: None       Past Medical History:   Diagnosis Date    Cancer [facility])     prostate ca,had radiation    Cardiac disease     stents,then triple bypass    [facility]     Diabetes mellitus [facility]     ETOH abuse     Hx of heart artery stent     2014    Hyperlipidemia     Hypertension     Pneumonia     Prostate CA [facility]     Renal disorder     pyelonephritis    S/P CABG x 1     2004       Past Surgical History:   Procedure Laterality Date    CORONARY ARTERY BYPASS GRAFT  2004    RI ARTHRODESIS ANT INTERBODY MIN DISCECTOMY, CERVICAL BELOW C2 N/A 12/16/2020    Procedure: Anterior cervical discectomy with fusion C4-C7;  Posterior cervical decompression and fusion C2-T2;  Surgeon: Marlin Patrick MD;  [facility];  Service: Neurosurgery    TONSILLECTOMY         Family History   Problem Relation Age of Onset    Diabetes Mother     Uterine cancer Mother     COPD Father     Hypertension Father      I have reviewed and agree with the history as documented  E-Cigarette/Vaping    E-Cigarette Use Never User      E-Cigarette/Vaping Substances    Nicotine Yes     THC No     CBD No     Flavoring No     Other No     Unknown No      Social History     Tobacco Use    Smoking status: Current Every Day Smoker     Packs/day: 1 50     Years: 40 00     Pack years: 60 00    Smokeless tobacco: Never Used   Vaping Use    Vaping Use: Never used   Substance Use Topics    Alcohol use: Yes     Alcohol/week: 10 0 standard drinks     Types: 10 Shots of liquor per week     Comment: drinking a 5th of vodka daily    Drug use: No       Review of Systems   Constitutional: Negative  Negative for chills and fever  HENT: Negative  Negative for congestion and sore throat  Eyes: Negative  Respiratory: Negative  Negative for cough and shortness of breath  Cardiovascular: Negative  Negative for chest pain and leg swelling  Gastrointestinal: Negative  Negative for abdominal pain, diarrhea, nausea and vomiting  Genitourinary: Negative  Negative for dysuria, flank pain and hematuria  Musculoskeletal: Negative  Negative for back pain and myalgias  Skin: Negative  Negative for rash and wound  Neurological: Positive for dizziness and weakness  Negative for headaches  Psychiatric/Behavioral: Negative  Negative for confusion and hallucinations  The patient is not nervous/anxious  All other systems reviewed and are negative  Physical Exam  Physical Exam  Vitals and nursing note reviewed  Constitutional:       General: He is not in acute distress  Appearance: He is well-developed  He is not ill-appearing, toxic-appearing or diaphoretic  HENT:      Head: Normocephalic and atraumatic  Eyes:      General: No scleral icterus       Conjunctiva/sclera: Conjunctivae normal    Cardiovascular:      Rate and Rhythm: Normal rate and regular rhythm  Heart sounds: Normal heart sounds  No murmur heard  Pulmonary:      Effort: Pulmonary effort is normal  No respiratory distress  Breath sounds: Normal breath sounds  Chest:      Chest wall: No tenderness  Abdominal:      General: Bowel sounds are normal  There is no distension  Palpations: Abdomen is soft  Tenderness: There is no abdominal tenderness  Musculoskeletal:         General: No tenderness or deformity  Normal range of motion  Cervical back: Normal range of motion and neck supple  Right lower leg: No edema  Left lower leg: No edema  Skin:     General: Skin is warm and dry  Coloration: Skin is not pale  Findings: No erythema or rash  Neurological:      General: No focal deficit present  Mental Status: He is alert and oriented to person, place, and time  Cranial Nerves: No cranial nerve deficit     Psychiatric:         Mood and Affect: Mood normal          Behavior: Behavior normal          Vital Signs  ED Triage Vitals [06/25/21 1636]   Temperature Pulse Respirations Blood Pressure SpO2   98 °F (36 7 °C) 80 18 110/58 97 %      Temp Source Heart Rate Source Patient Position - Orthostatic VS BP Location FiO2 (%)   Tympanic Monitor Lying Right arm --      Pain Score       --           Vitals:    06/25/21 1740 06/25/21 1818 06/25/21 1819 06/25/21 1822   BP: 117/64 118/55 106/54 110/52   Pulse: 78 75 82 90   Patient Position - Orthostatic VS: Lying Lying - Orthostatic VS Sitting - Orthostatic VS Standing - Orthostatic VS         Visual Acuity      ED Medications  Medications   sodium chloride 0 9 % bolus 1,000 mL (1,000 mL Intravenous New Bag 6/25/21 1650)       Diagnostic Studies  Results Reviewed     Procedure Component Value Units Date/Time    UA (URINE) with reflex to Scope [[id number]]  (Abnormal) Collected: 06/25/21 1744    Lab Status: Final result Specimen: Urine, Clean Catch Updated: 06/25/21 1749     Color, UA Light Yellow     Clarity, UA Clear     Specific [place], UA 1 020     pH, UA 5 5     Leukocytes, UA Negative     Nitrite, UA Negative     Protein, UA Negative mg/dl      Glucose, UA Negative mg/dl      Ketones, UA Negative mg/dl      Urobilinogen, UA 0 2 E U /dl      Bilirubin, UA Interference- unable to analyze     Blood, UA Negative    Troponin I [[id number]]  (Normal) Collected: 06/25/21 1649    Lab Status: Final result Specimen: Blood from Arm, Left Updated: 06/25/21 1719     Troponin I <0 02 ng/mL     Comprehensive metabolic panel [[id number]]  (Abnormal) Collected: 06/25/21 1649    Lab Status: Final result Specimen: Blood from Arm, Left Updated: 06/25/21 1714     Sodium 130 mmol/L      Potassium 4 7 mmol/L      Chloride 92 mmol/L      CO2 22 mmol/L      ANION GAP 16 mmol/L      BUN 19 mg/dL      Creatinine 1 46 mg/dL      Glucose 97 mg/dL      Calcium 8 3 mg/dL      Corrected Calcium 8 9 mg/dL      AST 36 U/L      ALT 29 U/L      Alkaline Phosphatase 88 U/L      Total Protein 7 0 g/dL      Albumin 3 3 g/dL      Total Bilirubin 0 36 mg/dL      eGFR 52 ml/min/1 73sq m     Narrative:      [place] guidelines for Chronic Kidney Disease (CKD):     Stage 1 with normal or high GFR (GFR > 90 mL/min/1 73 square meters)    Stage 2 Mild CKD (GFR = 60-89 mL/min/1 73 square meters)    Stage 3A Moderate CKD (GFR = 45-59 mL/min/1 73 square meters)    Stage 3B Moderate CKD (GFR = 30-44 mL/min/1 73 square meters)    Stage 4 Severe CKD (GFR = 15-29 mL/min/1 73 square meters)    Stage 5 End Stage CKD (GFR <15 mL/min/1 73 square meters)  Note: GFR calculation is accurate only with a steady state creatinine    Ethanol [[id number]]  (Abnormal) Collected: 06/25/21 1649    Lab Status: Final result Specimen: Blood from Arm, Left Updated: 06/25/21 1711     Ethanol Lvl 78 mg/dL     CBC and differential [[id number]]  (Abnormal) Collected: 06/25/21 1649 Lab Status: Final result Specimen: Blood from Arm, Left Updated: 06/25/21 1710     WBC 5 95 Thousand/uL      RBC 3 30 Million/uL      Hemoglobin 11 0 g/dL      Hematocrit 32 7 %      MCV 99 fL      MCH 33 3 pg      MCHC 33 6 g/dL      RDW 14 0 %      MPV 10 5 fL      Platelets 855 Thousands/uL      nRBC 0 /100 WBCs      Neutrophils Relative 60 %      Immat GRANS % 0 %      Lymphocytes Relative 24 %      Monocytes Relative 12 %      Eosinophils Relative 2 %      Basophils Relative 2 %      Neutrophils Absolute 3 65 Thousands/µL      Immature Grans Absolute 0 02 Thousand/uL      Lymphocytes Absolute 1 40 Thousands/µL      Monocytes Absolute 0 69 Thousand/µL      Eosinophils Absolute 0 10 Thousand/µL      Basophils Absolute 0 09 Thousands/µL     Protime-INR [[id number]]  (Normal) Collected: 06/25/21 1649    Lab Status: Final result Specimen: Blood from Arm, Left Updated: 06/25/21 1709     Protime 13 5 seconds      INR 1 04    APTT [[id number]]  (Normal) Collected: 06/25/21 1649    Lab Status: Final result Specimen: Blood from Arm, Left Updated: 06/25/21 1709     PTT 30 seconds                  No orders to display              Procedures  ECG 12 Lead Documentation Only    Date/Time: 6/25/2021 4:43 PM  Performed by: Marguerite Klein MD  Authorized by: Marguerite Klein MD     Indications / Diagnosis:  Dizziness  ECG reviewed by me, the ED Provider: yes    Patient location:  ED  Previous ECG:     Previous ECG:  Unavailable  Interpretation:     Interpretation: normal    Rate:     ECG rate:  72    ECG rate assessment: normal    Rhythm:     Rhythm: sinus rhythm    Ectopy:     Ectopy: none    QRS:     QRS axis:  Normal  Conduction:     Conduction: normal    ST segments:     ST segments:  Normal  T waves:     T waves: inverted      Inverted:  V1 and V2  Q waves:     Q waves:  V1 and V2             ED Course                                           MDM  Number of Diagnoses or Management Options  Alcohol intoxication ([facility] )  Dehydration  Dizziness  Hypotension  Diagnosis management comments: Pt  Feels fine, ate and drank here  Orthostatics normal per nurse after IVF  Will discharge, advised rest, no alcohol  Disposition  Final diagnoses:   Hypotension   Dizziness   Dehydration   Alcohol intoxication ([facility] )     Time reflects when diagnosis was documented in both MDM as applicable and the Disposition within this note     Time User Action Codes Description Comment    3/18/6142  2:68 PM Renetta Fish A Add [I03 1] Hypotension     3/25/9228  4:86 PM Aldona Dikes Add [Z21] Dizziness     1/97/3428  9:57 PM Aldona Dikes Add [R83 0] Dehydration     5/40/8736  0:57 PM Aldona Dikes Add [P81 209] Alcohol intoxication [facility])       ED Disposition     ED Disposition Condition Date/Time Comment    Discharge Stable Fri Jun 25, 2021  6:28 PM Maryana Keyes discharge to home/self care  Follow-up Information     Follow up With Specialties Details Why Contact Info    Henrry Munoz MD Family Medicine  As needed [address]   [phone]            Patient's Medications   Discharge Prescriptions    No medications on file     No discharge procedures on file      PDMP Review       Value Time User    PDMP Reviewed  Yes 12/21/2020  3:08 PM Klaus Carter PA-C          ED Provider  Electronically Signed by           Shiloh Cordova MD  69/98/88 9154
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
## 2021-06-25 NOTE — TELEPHONE ENCOUNTER
CHF Evaluation/Screening Form    Patient Level Data  Encounter Level Data  Knowledge Assessment/Post-Discharge Questions  Following Diet: Foods to Limit/Avoid Salt: Yes  Daily Weights Done?: No (Comment: Unknown)  Date of last weight: 6/25/21  Knows name of medication/water pill?: No (Comment: No Water pill Ordered)  Understands Activity/Exercise:  Yes  Knows when to report symptoms?: Yes  Plan in place to call for worsening symptoms?: Yes  Does the patient have a means of transporation?: Yes  Who is the primary learner?: The patient--Gregg

## 2021-06-25 NOTE — PROGRESS NOTES
After was seen in office today, went to get a pizza at the [facility] [address] across the way and returned to our waiting room waiting for a cab where was observed by  Rod to appear shaky and not looking well  Feels lightheaded and does not want to stand up  Took all his meds today  Last drink was a couple of vodkas around 11 am today  Thinks he double dosed on his Ranolazine  mg BID for past week, including today  Denies CP/SOB  Last BM yesterday  NO blood in stool  Urination unchanged  Appears tremulous  Alert and oriented  Able to make a phone call and talk normally  3:55 pm: BP 76/38 LA sitting;  BP RA sittin/42  4:05: Repeat RA sitting BP is 80/40  BS 83  pULSE OX 90%  Pulse 76 regular    Alert, tremulous  BRIAN  Lungs: scattered wheezes  Cor RR no murmur  Abd NABS, soft  Ext: no CCE  Has dorsalis pedis pulses and warm feet  911 called for transfer via MICU to [facility] ER for unexplained sudden onset hypotension  4:12 MICU arrived and case signed out to them  Pt was in same condition with hypotension but otherwise no change in status, alert and cooperative at the time of discharge

## 2021-06-27 LAB
ATRIAL RATE: 72 BPM
P AXIS: 76 DEGREES
PR INTERVAL: 138 MS
QRS AXIS: 76 DEGREES
QRSD INTERVAL: 86 MS
QT INTERVAL: 422 MS
QTC INTERVAL: 462 MS
T WAVE AXIS: 60 DEGREES
VENTRICULAR RATE: 72 BPM

## 2021-06-27 PROCEDURE — 93010 ELECTROCARDIOGRAM REPORT: CPT | Performed by: INTERNAL MEDICINE

## 2021-06-27 NOTE — PROGRESS NOTES
Assessment/Plan:      Diagnoses and all orders for this visit:    Atrial fibrillation, unspecified type [facility])  -     Ambulatory referral to Cardiology; Future    Coronary artery disease with angina pectoris, unspecified vessel or lesion type, unspecified whether native or transplanted heart [facility])  -     Ambulatory referral to Cardiology; Future    Gastroesophageal reflux disease, unspecified whether esophagitis present  -     omeprazole (PriLOSEC) 20 mg delayed release capsule; Take 1 capsule (20 mg total) by mouth daily      Patient advised to go  new blister pack of medications  He needs to fu with us in the net month for AWV  I did provide him with referral to cards as he not yet followed up  All questions answered  Subjective:     Patient ID: Niurka Arias is a 63 y o  male  Patient presents today for fu/ He has a h/o etoh abuse, AF not on AC, CAD, HTN and HLD  During med rec by nurse it was noted patient has been taking ranexa double dose by accident as he has 2 rx's for this med  Patient admitted overnight for chest pain about 1 month ago, ACS was ruled out and he has not yet followed up with cardiology  Patient smells of etoh and admits to drinking a pint of vodka this am  He is in a wheel chair as he states he is unsteady on his feet  Discussed patient having blister pack with all of his meds organized for him to be picked up at [address] [place] [address] assisting during visit  Patient otherwise with no compliants today  Review of Systems   Constitutional: Negative for chills and fever  HENT: Negative for ear pain and sore throat  Eyes: Negative for pain and visual disturbance  Respiratory: Negative for cough and shortness of breath  Cardiovascular: Negative for chest pain and palpitations  Gastrointestinal: Negative for abdominal pain and vomiting  Genitourinary: Negative for dysuria and hematuria  Musculoskeletal: Positive for gait problem   Negative for arthralgias and back pain  Skin: Negative for color change and rash  Neurological: Positive for weakness  Negative for seizures and syncope  All other systems reviewed and are negative  Objective:    Vitals:    06/25/21 1414   BP: 136/74   BP Location: Left arm   Patient Position: Sitting   Cuff Size: Large   Pulse: 77   Resp: 22   Temp: 98 1 °F (36 7 °C)   TempSrc: Tympanic   SpO2: 97%   Weight: 83 7 kg (184 lb 8 oz)   Height: 6' 2" (1 88 m)        Physical Exam  Constitutional:       Appearance: He is well-developed  Comments: In wheel chair, disheveled, smells of etoh   HENT:      Head: Normocephalic and atraumatic  Right Ear: External ear normal       Left Ear: External ear normal       Mouth/Throat:      Mouth: Mucous membranes are moist    Eyes:      Extraocular Movements: Extraocular movements intact  Conjunctiva/sclera: Conjunctivae normal    Cardiovascular:      Rate and Rhythm: Normal rate and regular rhythm  Heart sounds: No murmur heard  Pulmonary:      Effort: Pulmonary effort is normal       Breath sounds: Normal breath sounds  No wheezing  Neurological:      General: No focal deficit present  Mental Status: He is alert and oriented to person, place, and time  Psychiatric:         Mood and Affect: Mood normal          Thought Content:  Thought content normal

## 2021-07-14 ENCOUNTER — HOSPITAL ENCOUNTER (EMERGENCY)
Facility: HOSPITAL | Age: 61
Discharge: HOME | End: 2021-07-15
Attending: EMERGENCY MEDICINE | Admitting: EMERGENCY MEDICINE
Payer: MEDICARE

## 2021-07-14 DIAGNOSIS — F10.929 ALCOHOL INTOXICATION (HCC): Primary | ICD-10-CM

## 2021-07-14 DIAGNOSIS — F10.10 ALCOHOL ABUSE: ICD-10-CM

## 2021-07-14 LAB
BASOPHILS # BLD AUTO: 0.05 THOUSANDS/ΜL (ref 0–0.1)
BASOPHILS NFR BLD AUTO: 1 % (ref 0–1)
EOSINOPHIL # BLD AUTO: 0.05 THOUSAND/ΜL (ref 0–0.61)
EOSINOPHIL NFR BLD AUTO: 1 % (ref 0–6)
ERYTHROCYTE [DISTWIDTH] IN BLOOD BY AUTOMATED COUNT: 13.6 % (ref 11.6–15.1)
ETHANOL SERPL-MCNC: 293 MG/DL (ref 0–3)
HCT VFR BLD AUTO: 32.8 % (ref 36.5–49.3)
HGB BLD-MCNC: 11.2 G/DL (ref 12–17)
IMM GRANULOCYTES # BLD AUTO: 0.01 THOUSAND/UL (ref 0–0.2)
IMM GRANULOCYTES NFR BLD AUTO: 0 % (ref 0–2)
LYMPHOCYTES # BLD AUTO: 1.01 THOUSANDS/ΜL (ref 0.6–4.47)
LYMPHOCYTES NFR BLD AUTO: 29 % (ref 14–44)
MCH RBC QN AUTO: 33.2 PG (ref 26.8–34.3)
MCHC RBC AUTO-ENTMCNC: 34.1 G/DL (ref 31.4–37.4)
MCV RBC AUTO: 97 FL (ref 82–98)
MONOCYTES # BLD AUTO: 0.46 THOUSAND/ΜL (ref 0.17–1.22)
MONOCYTES NFR BLD AUTO: 13 % (ref 4–12)
NEUTROPHILS # BLD AUTO: 1.93 THOUSANDS/ΜL (ref 1.85–7.62)
NEUTS SEG NFR BLD AUTO: 56 % (ref 43–75)
NRBC BLD AUTO-RTO: 0 /100 WBCS
PLATELET # BLD AUTO: 61 THOUSANDS/UL (ref 149–390)
PMV BLD AUTO: 10.7 FL (ref 8.9–12.7)
RBC # BLD AUTO: 3.37 MILLION/UL (ref 3.88–5.62)
WBC # BLD AUTO: 3.51 THOUSAND/UL (ref 4.31–10.16)

## 2021-07-14 PROCEDURE — 80053 COMPREHEN METABOLIC PANEL: CPT | Performed by: EMERGENCY MEDICINE

## 2021-07-14 PROCEDURE — 85025 COMPLETE CBC W/AUTO DIFF WBC: CPT | Performed by: EMERGENCY MEDICINE

## 2021-07-14 PROCEDURE — 82077 ASSAY SPEC XCP UR&BREATH IA: CPT | Performed by: EMERGENCY MEDICINE

## 2021-07-14 PROCEDURE — 83690 ASSAY OF LIPASE: CPT | Performed by: EMERGENCY MEDICINE

## 2021-07-14 PROCEDURE — 99285 EMERGENCY DEPT VISIT HI MDM: CPT

## 2021-07-14 PROCEDURE — 36415 COLL VENOUS BLD VENIPUNCTURE: CPT | Performed by: EMERGENCY MEDICINE

## 2021-07-14 PROCEDURE — 99284 EMERGENCY DEPT VISIT MOD MDM: CPT | Performed by: EMERGENCY MEDICINE

## 2021-07-14 NOTE — Clinical Note
Karen Quinn was seen and treated in our emergency department on 7/14/2021  Diagnosis:     Guy Lopez  may return to work on return date  He may return on this date: 07/16/2021         If you have any questions or concerns, please don't hesitate to call        Jadiel Weller DO    ______________________________           _______________          _______________  Hospital Representative                              Date                                Time

## 2021-07-14 NOTE — Clinical Note
Angelica Fuentes was seen and treated in our emergency department on 7/14/2021  Diagnosis:     Maria Fernanda Alvarado  may return to work on return date  He may return on this date: 07/16/2021         If you have any questions or concerns, please don't hesitate to call        Sagar Garcia, DO    ______________________________           _______________          _______________  Hospital Representative                              Date                                Time

## 2021-07-14 NOTE — Clinical Note
Rei Galvan was seen and treated in our emergency department on 7/14/2021  Diagnosis:     Nelly Jennings  may return to work on return date  He may return on this date: 07/16/2021         If you have any questions or concerns, please don't hesitate to call        Janell Herman DO    ______________________________           _______________          _______________  Hospital Representative                              Date                                Time

## 2021-07-14 NOTE — Clinical Note
Kiki Frankie was seen and treated in our emergency department on 7/14/2021  Diagnosis:     Valentina Blake  may return to work on return date  He may return on this date: 07/16/2021         If you have any questions or concerns, please don't hesitate to call        Johny Vidal DO    ______________________________           _______________          _______________  Hospital Representative                              Date                                Time

## 2021-07-14 NOTE — Clinical Note
Mara Clay was seen and treated in our emergency department on 7/14/2021  Diagnosis:     Cee Kelley  may return to work on return date  He may return on this date: 07/16/2021         If you have any questions or concerns, please don't hesitate to call        Loretta Pedro DO    ______________________________           _______________          _______________  Hospital Representative                              Date                                Time

## 2021-07-15 VITALS
BODY MASS INDEX: 30.48 KG/M2 | RESPIRATION RATE: 16 BRPM | TEMPERATURE: 97.8 F | WEIGHT: 230 LBS | HEIGHT: 73 IN | DIASTOLIC BLOOD PRESSURE: 78 MMHG | HEART RATE: 79 BPM | OXYGEN SATURATION: 96 % | SYSTOLIC BLOOD PRESSURE: 129 MMHG

## 2021-07-15 LAB
ALBUMIN SERPL BCP-MCNC: 3.4 G/DL (ref 3.5–5)
ALP SERPL-CCNC: 89 U/L (ref 46–116)
ALT SERPL W P-5'-P-CCNC: 41 U/L (ref 12–78)
ANION GAP SERPL CALCULATED.3IONS-SCNC: 14 MMOL/L (ref 4–13)
AST SERPL W P-5'-P-CCNC: 49 U/L (ref 5–45)
BILIRUB SERPL-MCNC: 0.51 MG/DL (ref 0.2–1)
BUN SERPL-MCNC: 14 MG/DL (ref 5–25)
CALCIUM ALBUM COR SERPL-MCNC: 8.8 MG/DL (ref 8.3–10.1)
CALCIUM SERPL-MCNC: 8.3 MG/DL (ref 8.3–10.1)
CHLORIDE SERPL-SCNC: 92 MMOL/L (ref 100–108)
CO2 SERPL-SCNC: 23 MMOL/L (ref 21–32)
CREAT SERPL-MCNC: 1.31 MG/DL (ref 0.6–1.3)
GFR SERPL CREATININE-BSD FRML MDRD: 59 ML/MIN/1.73SQ M
GLUCOSE SERPL-MCNC: 99 MG/DL (ref 65–140)
LIPASE SERPL-CCNC: 265 U/L (ref 73–393)
POTASSIUM SERPL-SCNC: 4 MMOL/L (ref 3.5–5.3)
PROT SERPL-MCNC: 6.9 G/DL (ref 6.4–8.2)
SODIUM SERPL-SCNC: 129 MMOL/L (ref 136–145)

## 2021-07-15 RX ORDER — CHLORDIAZEPOXIDE HYDROCHLORIDE 25 MG/1
50 CAPSULE, GELATIN COATED ORAL ONCE
Status: COMPLETED | OUTPATIENT
Start: 2021-07-15 | End: 2021-07-15

## 2021-07-15 RX ORDER — CHLORDIAZEPOXIDE HYDROCHLORIDE 25 MG/1
25 CAPSULE, GELATIN COATED ORAL EVERY 4 HOURS
Qty: 20 CAPSULE | Refills: 0 | Status: SHIPPED | OUTPATIENT
Start: 2021-07-15 | End: 2021-07-23 | Stop reason: SDUPTHER

## 2021-07-15 RX ORDER — CHLORDIAZEPOXIDE HYDROCHLORIDE 25 MG/1
25 CAPSULE, GELATIN COATED ORAL EVERY 4 HOURS
Qty: 20 CAPSULE | Refills: 0 | Status: SHIPPED | OUTPATIENT
Start: 2021-07-15 | End: 2021-07-15 | Stop reason: SDUPTHER

## 2021-07-15 RX ADMIN — CHLORDIAZEPOXIDE HYDROCHLORIDE 50 MG: 25 CAPSULE ORAL at 05:42

## 2021-07-15 NOTE — ED NOTES
Pt relaxing in his bed at this time, watching television and dozing off  Pt reports no feeling of agitation, shaking, or other symptoms at this time  Pt provided a pillow and is currently resting       Pete Lozada RN  07/15/21 5257

## 2021-07-15 NOTE — ED PROVIDER NOTES
History  Chief Complaint   Patient presents with    Withdrawal - Alcohol     Patient comes via EMS due to  calling to have patient brought here for alcohol withdrawal symptoms,but patient had alcohol a half hour ago and is not withdrawing actively at this time     Patient presents via EMS for evaluation of alcohol withdrawal   States his  advised of the come to the ER  Patient states his last drink was 1/2 hour ago  He did have 1 episode of vomiting this morning but none since  Not currently have any symptoms of withdrawal       History provided by:  Patient   used: No    Withdrawal - Alcohol  Associated symptoms: vomiting    Associated symptoms: no abdominal pain, no nausea, no palpitations, no seizures, no shortness of breath and no suicidal ideation        Prior to Admission Medications   Prescriptions Last Dose Informant Patient Reported? Taking?    Blood Glucose Monitoring Suppl (ONE TOUCH ULTRA MINI) w/Device KIT  Self Yes Yes   Sig: Use as directed   Breo Ellipta 200-25 MCG/INH inhaler   No Yes   Sig: INHALE 1 PUFF EVERY 12 HOURS   ONETOUCH DELICA LANCETS FINE MISC  Self Yes Yes   Sig: 3 (three) times a day Test   Ventolin  (90 Base) MCG/ACT inhaler   No Yes   Sig: INHALE 2 PUFFS EVERY 4 HOURS AS NEEDED FOR WHEEZING   amLODIPine (NORVASC) 5 mg tablet   No Yes   Sig: TAKE 1 TABLET BY MOUTH EVERY DAY   folic acid (FOLVITE) 1 mg tablet   No Yes   Sig: TAKE 1 TABLET BY MOUTH EVERY DAY   gabapentin (NEURONTIN) 300 mg capsule   No Yes   Sig: Take 1 capsule (300 mg total) by mouth 3 (three) times a day   lisinopril (ZESTRIL) 10 mg tablet   No Yes   Sig: TAKE 1 TABLET BY MOUTH EVERY DAY   magnesium oxide (MAG-OX) 400 mg   No Yes   Sig: TAKE 1 TABLET BY MOUTH TWICE A DAY   metFORMIN (GLUCOPHAGE) 1000 MG tablet   No Yes   Sig: TAKE 1 TABLET BY MOUTH EVERY 12 HOURS   metoprolol tartrate (LOPRESSOR) 25 mg tablet   No Yes   Sig: TAKE 1 TABLET BY MOUTH EVERY 12 HOURS omeprazole (PriLOSEC) 20 mg delayed release capsule   No Yes   Sig: Take 1 capsule (20 mg total) by mouth daily   pravastatin (PRAVACHOL) 40 mg tablet   No Yes   Sig: TAKE 1 TABLET BY MOUTH EVERY DAY WITH DINNER   ranolazine (RANEXA) 500 mg 12 hr tablet   No Yes   Sig: Take 1 tablet (500 mg total) by mouth every 12 (twelve) hours   tamsulosin (FLOMAX) 0 4 mg   Yes Yes   Sig: Take 0 4 mg by mouth daily   thiamine 100 MG tablet   No Yes   Sig: TAKE 1 TABLET BY MOUTH EVERY DAY      Facility-Administered Medications: None       Past Medical History:   Diagnosis Date    [facility])     prostate ca,had radiation    Cardiac disease     stents,then triple bypass    [facility]     Diabetes mellitus [facility]     ETOH abuse     Hx of heart artery stent     2014    Hyperlipidemia     Hypertension     Pneumonia     Prostate CA ([facility]     Renal disorder     pyelonephritis    S/P CABG x 1     2004       Past Surgical History:   Procedure Laterality Date    CORONARY ARTERY BYPASS GRAFT  2004    KS ARTHRODESIS ANT INTERBODY MIN DISCECTOMY, CERVICAL BELOW C2 N/A 12/16/2020    Procedure: Anterior cervical discectomy with fusion C4-C7; Posterior cervical decompression and fusion C2-T2;  Surgeon: Shanice Nickerson MD;  [facility];  Service: Neurosurgery    TONSILLECTOMY         Family History   Problem Relation Age of Onset    Diabetes Mother     Uterine cancer Mother     COPD Father     Hypertension Father      I have reviewed and agree with the history as documented      E-Cigarette/Vaping    E-Cigarette Use Never User      E-Cigarette/Vaping Substances    Nicotine Yes     THC No     CBD No     Flavoring No     Other No     Unknown No      Social History     Tobacco Use    Smoking status: Current Every Day Smoker     Packs/day: 1 50     Years: 40 00     Pack years: 60 00    Smokeless tobacco: Never Used   Vaping Use    Vaping Use: Never used   Substance Use Topics  Alcohol use: Yes     Alcohol/week: 10 0 standard drinks     Types: 10 Shots of liquor per week     Comment: drinking a 5th of vodka daily    Drug use: No       Review of Systems   Constitutional: Negative for chills and fever  HENT: Negative for ear pain and sore throat  Eyes: Negative for pain and visual disturbance  Respiratory: Negative for cough and shortness of breath  Cardiovascular: Negative for chest pain and palpitations  Gastrointestinal: Positive for vomiting  Negative for abdominal pain and nausea  Genitourinary: Negative for dysuria and hematuria  Musculoskeletal: Negative for arthralgias and back pain  Skin: Negative for color change and rash  Neurological: Negative for seizures and syncope  Psychiatric/Behavioral: Negative for suicidal ideas  All other systems reviewed and are negative  Physical Exam  Physical Exam  Vitals and nursing note reviewed  Constitutional:       General: He is not in acute distress  Appearance: Normal appearance  HENT:      Head: Atraumatic  Right Ear: External ear normal       Left Ear: External ear normal       Nose: Nose normal       Mouth/Throat:      Mouth: Mucous membranes are moist       Pharynx: Oropharynx is clear  Eyes:      General: No scleral icterus  Conjunctiva/sclera: Conjunctivae normal    Cardiovascular:      Rate and Rhythm: Normal rate and regular rhythm  Pulses: Normal pulses  Pulmonary:      Effort: Pulmonary effort is normal  No respiratory distress  Breath sounds: Normal breath sounds  Abdominal:      General: Abdomen is flat  Bowel sounds are normal  There is no distension  Palpations: Abdomen is soft  Tenderness: There is no abdominal tenderness  There is no guarding or rebound  Musculoskeletal:         General: No deformity  Normal range of motion  Skin:     Capillary Refill: Capillary refill takes less than 2 seconds  Findings: No rash     Neurological: General: No focal deficit present  Mental Status: He is alert and oriented to person, place, and time           Vital Signs  ED Triage Vitals [07/14/21 2322]   Temperature Pulse Respirations Blood Pressure SpO2   97 8 °F (36 6 °C) 76 18 111/57 96 %      Temp Source Heart Rate Source Patient Position - Orthostatic VS BP Location FiO2 (%)   Tympanic Monitor Lying Right arm --      Pain Score       --           Vitals:    07/14/21 2322 07/15/21 0527 07/15/21 0808   BP: 111/57 131/77 129/78   Pulse: 76 90 79   Patient Position - Orthostatic VS: Lying Lying Lying         Visual Acuity      ED Medications  Medications   chlordiazePOXIDE (LIBRIUM) capsule 50 mg (50 mg Oral Given 7/15/21 0542)       Diagnostic Studies  Results Reviewed     Procedure Component Value Units Date/Time    Lipase [[id number]]  (Normal) Collected: 07/14/21 2336    Lab Status: Final result Specimen: Blood from Arm, Left Updated: 07/15/21 0008     Lipase 265 u/L     Comprehensive metabolic panel [[id number]]  (Abnormal) Collected: 07/14/21 2336    Lab Status: Final result Specimen: Blood from Arm, Left Updated: 07/15/21 0008     Sodium 129 mmol/L      Potassium 4 0 mmol/L      Chloride 92 mmol/L      CO2 23 mmol/L      ANION GAP 14 mmol/L      BUN 14 mg/dL      Creatinine 1 31 mg/dL      Glucose 99 mg/dL      Calcium 8 3 mg/dL      Corrected Calcium 8 8 mg/dL      AST 49 U/L      ALT 41 U/L      Alkaline Phosphatase 89 U/L      Total Protein 6 9 g/dL      Albumin 3 4 g/dL      Total Bilirubin 0 51 mg/dL      eGFR 59 ml/min/1 73sq m     Narrative:      [place]k guidelines for Chronic Kidney Disease (CKD):     Stage 1 with normal or high GFR (GFR > 90 mL/min/1 73 square meters)    Stage 2 Mild CKD (GFR = 60-89 mL/min/1 73 square meters)    Stage 3A Moderate CKD (GFR = 45-59 mL/min/1 73 square meters)    Stage 3B Moderate CKD (GFR = 30-44 mL/min/1 73 square meters)    Stage 4 Severe CKD (GFR = 15-29 mL/min/1 73 square meters)    Stage 5 End Stage CKD (GFR <15 mL/min/1 73 square meters)  Note: GFR calculation is accurate only with a steady state creatinine    Ethanol [[id number]]  (Abnormal) Collected: 07/14/21 2336    Lab Status: Final result Specimen: Blood from Arm, Left Updated: 07/14/21 2356     Ethanol Lvl 293 mg/dL     CBC and differential [[id number]]  (Abnormal) Collected: 07/14/21 2336    Lab Status: Final result Specimen: Blood from Arm, Left Updated: 07/14/21 2355     WBC 3 51 Thousand/uL      RBC 3 37 Million/uL      Hemoglobin 11 2 g/dL      Hematocrit 32 8 %      MCV 97 fL      MCH 33 2 pg      MCHC 34 1 g/dL      RDW 13 6 %      MPV 10 7 fL      Platelets 61 Thousands/uL      nRBC 0 /100 WBCs      Neutrophils Relative 56 %      Immat GRANS % 0 %      Lymphocytes Relative 29 %      Monocytes Relative 13 %      Eosinophils Relative 1 %      Basophils Relative 1 %      Neutrophils Absolute 1 93 Thousands/µL      Immature Grans Absolute 0 01 Thousand/uL      Lymphocytes Absolute 1 01 Thousands/µL      Monocytes Absolute 0 46 Thousand/µL      Eosinophils Absolute 0 05 Thousand/µL      Basophils Absolute 0 05 Thousands/µL                  No orders to display              Procedures  Procedures         ED Course                                           MDM  Number of Diagnoses or Management Options  Alcohol abuse  [facility]  Diagnosis management comments: Pulse ox 96% of indicating adequate oxygenation  Patient signed out next provider, Dr Efra Webster pending [facility] referral and sobriety          Amount and/or Complexity of Data Reviewed  Clinical lab tests: ordered and reviewed  Decide to obtain previous medical records or to obtain history from someone other than the patient: yes  Review and summarize past medical records: yes    Patient Progress  Patient progress: stable      Disposition  Final diagnoses:   Alcohol intoxication ([facility] )   Alcohol abuse     Time reflects when diagnosis was documented in both MDM as applicable and the Disposition within this note     Time User Action Codes Description Comment    7/15/2021 12:26 AM Mac Molina Add [F10 929] [facility]     7/15/2021  7:30 AM Wilhemena Barer Add [F10 10] Alcohol abuse     7/15/2021  7:30 AM Wilhemena Barer Add [F10 20] Alcoholism ([facility]     7/15/2021  7:30 AM Wilhemena Barer Remove [F10 20] Alcoholism [facility])       ED Disposition     ED Disposition Condition Date/Time Comment    Discharge Stable u Jul 15, 2021  7:30 AM Marielos Coleman discharge to home/self care  Follow-up Information    None         Discharge Medication List as of 7/15/2021  7:55 AM      START taking these medications    Details   chlordiazePOXIDE (LIBRIUM) 25 mg capsule Take 1 capsule (25 mg total) by mouth every 4 (four) hours for 3 days Take 50 mg every 4 hours for 6 doses, followed by 50 mg every 6 hours for 4 hours, followed by 25 mg every 4 hours for 6 hours followed, by 25 mg every 6 hours for 4 hours  , Starting T hu 7/15/2021, Until Sun 7/18/2021, Normal         CONTINUE these medications which have NOT CHANGED    Details   amLODIPine (NORVASC) 5 mg tablet TAKE 1 TABLET BY MOUTH EVERY DAY, Normal      Blood Glucose Monitoring Suppl (ONE TOUCH ULTRA MINI) w/Device KIT Use as directed, Starting Thu 5/16/2019, Historical Med      Breo Ellipta 200-25 MCG/INH inhaler INHALE 1 PUFF EVERY 12 HOURS, Normal      folic acid (FOLVITE) 1 mg tablet TAKE 1 TABLET BY MOUTH EVERY DAY, Normal      gabapentin (NEURONTIN) 300 mg capsule Take 1 capsule (300 mg total) by mouth 3 (three) times a day, Starting Mon 4/12/2021, Normal      lisinopril (ZESTRIL) 10 mg tablet TAKE 1 TABLET BY MOUTH EVERY DAY, Normal      magnesium oxide (MAG-OX) 400 mg TAKE 1 TABLET BY MOUTH TWICE A DAY, Normal      metFORMIN (GLUCOPHAGE) 1000 MG tablet TAKE 1 TABLET BY MOUTH EVERY 12 HOURS, Normal      metoprolol tartrate (LOPRESSOR) 25 mg tablet TAKE 1 TABLET BY MOUTH EVERY 12 HOURS, Normal omeprazole (PriLOSEC) 20 mg delayed release capsule Take 1 capsule (20 mg total) by mouth daily, Starting Fri 6/25/2021, Normal      ONETOUCH DELICA LANCETS FINE MISC 3 (three) times a day Test, Starting Thu 5/16/2019, Historical Med      pravastatin (PRAVACHOL) 40 mg tablet TAKE 1 TABLET BY MOUTH EVERY DAY WITH DINNER, Normal      ranolazine (RANEXA) 500 mg 12 hr tablet Take 1 tablet (500 mg total) by mouth every 12 (twelve) hours, Starting Mon 4/12/2021, Normal      tamsulosin (FLOMAX) 0 4 mg Take 0 4 mg by mouth daily, Starting Fri 9/11/2020, Historical Med      thiamine 100 MG tablet TAKE 1 TABLET BY MOUTH EVERY DAY, Normal      Ventolin  (90 Base) MCG/ACT inhaler INHALE 2 PUFFS EVERY 4 HOURS AS NEEDED FOR WHEEZING, Normal           No discharge procedures on file      PDMP Review       Value Time User    PDMP Reviewed  Yes 12/21/2020  3:08 PM Deon Ruiz PA-C          ED Provider  Electronically Signed by           Sai Díaz DO  07/16/21 0147

## 2021-07-15 NOTE — ED NOTES
Spoke with Randa Jimenez from [facility] for  Time virgen Brown requesting Detox @ this time        Raghavendra Villatoro RN  07/15/21 1808

## 2021-07-16 ENCOUNTER — PATIENT OUTREACH (OUTPATIENT)
Dept: CASE MANAGEMENT | Facility: OTHER | Age: 61
End: 2021-07-16

## 2021-07-21 DIAGNOSIS — F10.929 ALCOHOL INTOXICATION (HCC): ICD-10-CM

## 2021-07-21 DIAGNOSIS — F10.10 ALCOHOL ABUSE: ICD-10-CM

## 2021-07-21 RX ORDER — CHLORDIAZEPOXIDE HYDROCHLORIDE 25 MG/1
25 CAPSULE, GELATIN COATED ORAL EVERY 4 HOURS
Qty: 20 CAPSULE | Refills: 0 | OUTPATIENT
Start: 2021-07-21 | End: 2021-07-24

## 2021-07-21 RX ORDER — LANOLIN ALCOHOL/MO/W.PET/CERES
400 CREAM (GRAM) TOPICAL 2 TIMES DAILY
COMMUNITY
Start: 2021-07-16 | End: 2022-06-09 | Stop reason: SDUPTHER

## 2021-07-21 NOTE — TELEPHONE ENCOUNTER
Patient is saying that he want refills to be put on the RX because he is still going through with withdrawal      Patient is out of meds  Patient wants refill asap - I told him there is a 48-72 hour time frame for refills

## 2021-07-21 NOTE — TELEPHONE ENCOUNTER
Jono Robles, Will you please give Mr Singh Neighbor another three days of his Librium?  This is a controlled substance and will require  review (I'm still in the process of registering for mine!)    Thanks,    Gordy Joseph

## 2021-07-23 ENCOUNTER — PATIENT OUTREACH (OUTPATIENT)
Dept: FAMILY MEDICINE CLINIC | Facility: CLINIC | Age: 61
End: 2021-07-23

## 2021-07-23 ENCOUNTER — OFFICE VISIT (OUTPATIENT)
Dept: FAMILY MEDICINE CLINIC | Facility: CLINIC | Age: 61
End: 2021-07-23
Payer: MEDICARE

## 2021-07-23 VITALS
SYSTOLIC BLOOD PRESSURE: 100 MMHG | OXYGEN SATURATION: 97 % | TEMPERATURE: 97.4 F | WEIGHT: 189.8 LBS | BODY MASS INDEX: 25.15 KG/M2 | RESPIRATION RATE: 18 BRPM | HEART RATE: 81 BPM | HEIGHT: 73 IN | DIASTOLIC BLOOD PRESSURE: 50 MMHG

## 2021-07-23 DIAGNOSIS — F10.10 ALCOHOL ABUSE: ICD-10-CM

## 2021-07-23 DIAGNOSIS — F10.929 ALCOHOL INTOXICATION (HCC): ICD-10-CM

## 2021-07-23 DIAGNOSIS — F33.9 DEPRESSION, RECURRENT (HCC): ICD-10-CM

## 2021-07-23 DIAGNOSIS — E44.1 MILD PROTEIN-CALORIE MALNUTRITION (HCC): ICD-10-CM

## 2021-07-23 DIAGNOSIS — C61 PROSTATE CANCER (HCC): ICD-10-CM

## 2021-07-23 DIAGNOSIS — E11.00 TYPE 2 DIABETES MELLITUS WITH HYPEROSMOLARITY WITHOUT COMA, WITHOUT LONG-TERM CURRENT USE OF INSULIN (HCC): Primary | ICD-10-CM

## 2021-07-23 PROCEDURE — 99214 OFFICE O/P EST MOD 30 MIN: CPT | Performed by: FAMILY MEDICINE

## 2021-07-23 RX ORDER — CHLORDIAZEPOXIDE HYDROCHLORIDE 25 MG/1
25 CAPSULE, GELATIN COATED ORAL EVERY 4 HOURS
Qty: 15 CAPSULE | Refills: 0 | Status: SHIPPED | OUTPATIENT
Start: 2021-07-23 | End: 2021-07-26 | Stop reason: ALTCHOICE

## 2021-07-23 NOTE — PROGRESS NOTES
Met with patient during PCP visit  Pt here for rx of Librium  Pt reports he has been sober and has been working with Janie Hodges from [facility] program  Pt reports he has not been consuming alcohol  Plan is to get into intensive outpatient detox program  Ultimate goal is to get on vivitrol monthly injections  Pt reports he is starting to clean his apartment and working on maintaining sobriety  Wade Leach provided CM with phone number of counseling that he is working with  Outreach to [employer]  Confirms above  Pt has appointment on 8/2 to meet with intake counselor for intensive outpatient detox  Once intake is done, patient will be scheduled to meet with provider who will prescribe two weeks of naltraxalone before being started on monthly vivotrol injections  Information provided to Dr Terri Carpio  Pt will be started on librium until detox program is started  Pt in agreement with plan

## 2021-07-23 NOTE — PROGRESS NOTES
[facility]  63 [facility]   07/23/21    CC:   Alcohol withdrawal  Follow up:  Monday for refill on Librium    Problem List Items Addressed This Visit        Endocrine    Type 2 diabetes mellitus ([facility] ) - Primary (Chronic)    Relevant Orders    POCT hemoglobin A1c       Genitourinary    Prostate cancer ([facility] )       Other    Depression, recurrent ([facility] )    Mild protein-calorie malnutrition ([facility] )      · Attending Dr Luis Steve will prescribe this patient 3 days of Librium  Patient to return to the office on Monday for a refill for another 3 days  Patient should be checking in every 3 days with us while on this medication  · SW able to verify that this patient has an appointment with outpatient substance abuse counseling on August 2nd  We have agreed to prescribe 3 day intervals of Librium to bridge him to that appointment  · Patient counseling extensively on the dangers of using this medication with alcohol, including possible death  Subjective:       Patient is a 33-year-old male with past medical history significant for type 2 diabetes, COPD, CAD, hypertension, chronic kidney failure and alcohol abuse who presents today for a refill on his Librium  He was given Librium at his most recent visit to the emergency room  He states that has been helping him with his withdrawal symptoms which include headaches and shakiness  He is seeking help with outpatient services and states he has an appointment in August   Patient states he is very motivated to quit drinking and that he has not had a drink since he was in the emergency room      Review of Systems   Negative except for those noted in HPI    The following portions of the patient's history were reviewed and updated as appropriate:  current medications, past family history, past medical history, past social history, past surgical history and problem list     Current Outpatient Medications on File Prior to Visit   Medication Sig Dispense Refill    amLODIPine (NORVASC) 5 mg tablet TAKE 1 TABLET BY MOUTH EVERY DAY 90 tablet 2    Blood Glucose Monitoring Suppl (ONE TOUCH ULTRA MINI) w/Device KIT Use as directed  0    Breo Ellipta 200-25 MCG/INH inhaler INHALE 1 PUFF EVERY 12 HOURS 60 each 0    folic acid (FOLVITE) 1 mg tablet TAKE 1 TABLET BY MOUTH EVERY DAY 90 tablet 2    gabapentin (NEURONTIN) 300 mg capsule Take 1 capsule (300 mg total) by mouth 3 (three) times a day 90 capsule 3    lisinopril (ZESTRIL) 10 mg tablet TAKE 1 TABLET BY MOUTH EVERY DAY 90 tablet 0    magnesium Oxide (MAG-OX) 400 mg TABS Take 400 mg by mouth 2 (two) times a day      metFORMIN (GLUCOPHAGE) 1000 MG tablet TAKE 1 TABLET BY MOUTH EVERY 12 HOURS 180 tablet 3    metoprolol tartrate (LOPRESSOR) 25 mg tablet TAKE 1 TABLET BY MOUTH EVERY 12 HOURS 180 tablet 2    omeprazole (PriLOSEC) 20 mg delayed release capsule Take 1 capsule (20 mg total) by mouth daily 90 capsule 3    ONETOUCH DELICA LANCETS FINE MISC 3 (three) times a day Test  0    pravastatin (PRAVACHOL) 40 mg tablet TAKE 1 TABLET BY MOUTH EVERY DAY WITH DINNER 90 tablet 2    ranolazine (RANEXA) 500 mg 12 hr tablet Take 1 tablet (500 mg total) by mouth every 12 (twelve) hours 60 tablet 3    tamsulosin (FLOMAX) 0 4 mg Take 0 4 mg by mouth daily      thiamine 100 MG tablet TAKE 1 TABLET BY MOUTH EVERY DAY 90 tablet 2    Ventolin  (90 Base) MCG/ACT inhaler INHALE 2 PUFFS EVERY 4 HOURS AS NEEDED FOR WHEEZING 18 g 0    [DISCONTINUED] chlordiazePOXIDE (LIBRIUM) 25 mg capsule Take 1 capsule (25 mg total) by mouth every 4 (four) hours for 3 days (1-2 caps PRN) Do not mix with alcohol 20 capsule 0     No current facility-administered medications on file prior to visit  Objective:    /50   Pulse 81   Temp (!) 97 4 °F (36 3 °C) (Tympanic)   Resp 18   Ht 6' 1" (1 854 m)   Wt 86 1 kg (189 lb 12 8 oz)   SpO2 97%   BMI 25 04 kg/m²     Physical Exam  Constitutional:       Appearance: Normal appearance     Cardiovascular: Rate and Rhythm: Normal rate and regular rhythm  Heart sounds: Normal heart sounds  Pulmonary:      Effort: Pulmonary effort is normal       Breath sounds: Examination of the right-lower field reveals wheezing  Examination of the left-lower field reveals wheezing  Wheezing present  Abdominal:      Palpations: Abdomen is soft  Tenderness: There is no abdominal tenderness  Neurological:      Mental Status: He is alert and oriented to person, place, and time  Comments:  Tremors noted in upper extremities             Some portions of this record may have been generated with voice recognition software  There may be translation, syntax, or grammatical errors  Occasional wrong word or "sound-a-like" substitutions may have occurred due to the inherent limitations of the voice recognition software       [address] [facility]   PGY2

## 2021-07-26 ENCOUNTER — TELEPHONE (OUTPATIENT)
Dept: FAMILY MEDICINE CLINIC | Facility: CLINIC | Age: 61
End: 2021-07-26

## 2021-07-26 ENCOUNTER — PATIENT OUTREACH (OUTPATIENT)
Dept: FAMILY MEDICINE CLINIC | Facility: CLINIC | Age: 61
End: 2021-07-26

## 2021-07-26 DIAGNOSIS — F10.230 ALCOHOL WITHDRAWAL SYNDROME WITHOUT COMPLICATION (HCC): Primary | ICD-10-CM

## 2021-07-26 RX ORDER — DIAZEPAM 5 MG/1
5 TABLET ORAL EVERY 6 HOURS PRN
Qty: 15 TABLET | Refills: 0 | Status: SHIPPED | OUTPATIENT
Start: 2021-07-26 | End: 2022-04-11 | Stop reason: HOSPADM

## 2021-07-26 NOTE — TELEPHONE ENCOUNTER
Can call pharmacy to cancel the Librium previous order  I sent over Diazepam instead   Thank you, Dr Priti Flaherty
Hi Dr Sammi Del Rio, please see the message above in regards to this patient's librium order   Thank you    Eamon Segura
[facility] called to inform us that the Librium we ordered for this pt is on backorder and they will not have it in stock for some time (unspecified)  PharmD requesting alternative Rx to dispense to patient 
negative...

## 2021-07-26 NOTE — PROGRESS NOTES
Received return call from patient  Pt states he was unable to get Librium  He was told the order was cancelled  Chart review shows that librium in on back order at this time  Unsure when medication will be back in stock  Pt suggested calling [facility] pharmacy  In basket message sent to July Triplett and [place]

## 2021-07-26 NOTE — PROGRESS NOTES
Pt is no show for follow up appointment  Two attempts to outreach  LvM x 2  Outreach to [employer] from [employer]  LVM requesting return call  CM contact information provided

## 2021-07-27 ENCOUNTER — PATIENT OUTREACH (OUTPATIENT)
Dept: FAMILY MEDICINE CLINIC | Facility: CLINIC | Age: 61
End: 2021-07-27

## 2021-07-27 ENCOUNTER — TELEPHONE (OUTPATIENT)
Dept: FAMILY MEDICINE CLINIC | Facility: CLINIC | Age: 61
End: 2021-07-27

## 2021-07-27 NOTE — PROGRESS NOTES
Received phone call from patient  Pt states he received a phone call from [address] [place] [address]  He is unsure which prescription they are delivering  Chart review done  [facility] pharmacy notified  Confirmed that patient is to receive diazepam in lieu of librium  Pt has upcoming appointment with [facility] for detox  Pt reports that he is very motivated to continue to make changes and remain sober  Pt reports he continues to make positive changes in his life  CM will continue to assist patient in meeting healthcare and detox goals

## 2021-07-27 NOTE — TELEPHONE ENCOUNTER
----- Message from Meghana Ruiz MD sent at 7/26/2021  4:52 PM EDT -----  Regarding: Please schedule patient for Medicare Annual Wellness Visit  Please schedule patient for Medicare Annual Wellness Visit within the next 8 weeks  Also remind him to complete MyChart sign up   Thanks, Dr Chanelle Alcazar

## 2021-07-27 NOTE — TELEPHONE ENCOUNTER
Left Message asking patient to call the office to schedule appointment   Provided office hours and phone number

## 2021-08-18 ENCOUNTER — PATIENT OUTREACH (OUTPATIENT)
Dept: FAMILY MEDICINE CLINIC | Facility: CLINIC | Age: 61
End: 2021-08-18

## 2021-08-18 NOTE — PROGRESS NOTES
Received phone call from patient  Pt called to discuss making an appointment and to also discuss his alcohol recovery  Pt continues to work with alcohol recovery specialists  Pt reports he is now being more productive in his personal life  Goal is to get his drivers license back and return to work  Pt states he needs an appointment because his glucometer is not working  Now that he is sober he would like to start checking his BS and taking his medications as prescribed  Pt discussed some of the challenges of being sober, such as over eating and craving sugar  CM will assist patient with scheduling an appointment  CM will continue to monitor and assist patient in meeting healthcare goals

## 2021-08-20 ENCOUNTER — PATIENT OUTREACH (OUTPATIENT)
Dept: FAMILY MEDICINE CLINIC | Facility: CLINIC | Age: 61
End: 2021-08-20

## 2021-08-20 NOTE — PROGRESS NOTES
William Santos received phone call from detox counselor that patient was feeling lightheaded  Requested that CM call patient  Outreach to patient  States he is lying down  Felt dizzy earlier but feels better  States he took the bus to [facility] [place] and did a lot of walking around  Came back to his apartment and rode his bike  Pt believes that since he stopped drinking, perhaps he does not need as much BP medication  Pt was able to get his glucometer to work, so he checked his BS  Reading was 177  He took his metformin  Consuming water to stay hydrated  CM will working on moving his appointment up from 8/27  Spoke with   Appointment changed to 8/24 at 2:40 pm  Outreach to patient  Advised of new appointment date and time  Agreeable  Confirms that he is feeling better now  Will continue to monitor symptoms and rest  Will call CM with any questions or concerns

## 2021-08-24 ENCOUNTER — OFFICE VISIT (OUTPATIENT)
Dept: FAMILY MEDICINE CLINIC | Facility: CLINIC | Age: 61
End: 2021-08-24
Payer: MEDICARE

## 2021-08-24 VITALS
HEIGHT: 74 IN | OXYGEN SATURATION: 98 % | SYSTOLIC BLOOD PRESSURE: 98 MMHG | TEMPERATURE: 97.7 F | DIASTOLIC BLOOD PRESSURE: 60 MMHG | HEART RATE: 76 BPM | RESPIRATION RATE: 16 BRPM | BODY MASS INDEX: 25.54 KG/M2 | WEIGHT: 199 LBS

## 2021-08-24 DIAGNOSIS — E11.00 TYPE 2 DIABETES MELLITUS WITH HYPEROSMOLARITY WITHOUT COMA, WITHOUT LONG-TERM CURRENT USE OF INSULIN (HCC): Primary | ICD-10-CM

## 2021-08-24 LAB
SL AMB POCT GLUCOSE BLD: 83
SL AMB POCT HEMOGLOBIN AIC: 5.6 (ref ?–6.5)

## 2021-08-24 PROCEDURE — 82948 REAGENT STRIP/BLOOD GLUCOSE: CPT | Performed by: FAMILY MEDICINE

## 2021-08-24 PROCEDURE — 83036 HEMOGLOBIN GLYCOSYLATED A1C: CPT | Performed by: FAMILY MEDICINE

## 2021-08-24 PROCEDURE — 99213 OFFICE O/P EST LOW 20 MIN: CPT | Performed by: FAMILY MEDICINE

## 2021-08-24 NOTE — PROGRESS NOTES
Assessment/Plan:      Diagnoses and all orders for this visit:    Type 2 diabetes mellitus with hyperosmolarity without coma, without long-term current use of insulin ([facility])  -     POCT hemoglobin A1c  -     POCT blood glucose    Other orders  -     Cancel: Ambulatory referral to Cardiology; Future          Hemoglobin A1c 5 6 today  Random glucose 83  Patient's diabetes is well controlled  Will follow-up in 3 months  Erickson Melissa spoke with patient regarding making a cardiology appointment for his Afib  Patient has also had bouts of dizziness  Advised patient to bring up these concerns with the cardiologist   Patient has been staying well hydrated  Subjective:     Patient ID: Dawit Buitrago is a 63 y o  male  Diabetes  He presents for his follow-up diabetic visit  He has type 2 diabetes mellitus  Hypoglycemia symptoms include dizziness  Associated symptoms include blurred vision, polydipsia and polyuria  Pertinent negatives for diabetes include no chest pain, no foot paresthesias, no foot ulcerations, no weakness and no weight loss  Symptoms are stable  Risk factors for coronary artery disease include diabetes mellitus, male sex, hypertension and tobacco exposure  He is following a diabetic and generally healthy diet  He never participates in exercise  An ACE inhibitor/angiotensin II receptor blocker is being taken  He does not see a podiatrist Eye exam is not current  Review of Systems   Constitutional: Negative for weight loss  Eyes: Positive for blurred vision  Cardiovascular: Negative for chest pain  Endocrine: Positive for polydipsia and polyuria  Neurological: Positive for dizziness  Negative for weakness  Objective:     Physical Exam  Constitutional:       Appearance: Normal appearance  HENT:      Head: Normocephalic and atraumatic  Cardiovascular:      Heart sounds: Normal heart sounds  No murmur heard       Pulmonary:      Effort: Pulmonary effort is normal       Breath sounds: Normal breath sounds  No wheezing  Abdominal:      Palpations: Abdomen is soft  Tenderness: There is no abdominal tenderness  Musculoskeletal:      Right lower leg: No edema  Left lower leg: No edema  Neurological:      Mental Status: He is alert

## 2021-09-08 DIAGNOSIS — I10 ESSENTIAL (PRIMARY) HYPERTENSION: Chronic | ICD-10-CM

## 2021-09-09 RX ORDER — RANOLAZINE 500 MG/1
TABLET, EXTENDED RELEASE ORAL
Qty: 60 TABLET | Refills: 3 | Status: SHIPPED | OUTPATIENT
Start: 2021-09-09 | End: 2022-05-26 | Stop reason: SDUPTHER

## 2021-09-15 DIAGNOSIS — J44.9 COPD (CHRONIC OBSTRUCTIVE PULMONARY DISEASE) (HCC): ICD-10-CM

## 2021-09-15 DIAGNOSIS — G95.19 EDEMA, SPINAL CORD (HCC): ICD-10-CM

## 2021-09-15 RX ORDER — GABAPENTIN 300 MG/1
CAPSULE ORAL
Qty: 90 CAPSULE | Refills: 3 | Status: SHIPPED | OUTPATIENT
Start: 2021-09-15 | End: 2022-06-07

## 2021-10-01 ENCOUNTER — PATIENT OUTREACH (OUTPATIENT)
Dept: FAMILY MEDICINE CLINIC | Facility: CLINIC | Age: 61
End: 2021-10-01

## 2021-10-01 ENCOUNTER — OFFICE VISIT (OUTPATIENT)
Dept: FAMILY MEDICINE CLINIC | Facility: CLINIC | Age: 61
End: 2021-10-01
Payer: MEDICARE

## 2021-10-01 VITALS
OXYGEN SATURATION: 96 % | HEART RATE: 90 BPM | SYSTOLIC BLOOD PRESSURE: 92 MMHG | RESPIRATION RATE: 18 BRPM | WEIGHT: 196.6 LBS | TEMPERATURE: 97.3 F | HEIGHT: 74 IN | DIASTOLIC BLOOD PRESSURE: 60 MMHG | BODY MASS INDEX: 25.23 KG/M2

## 2021-10-01 DIAGNOSIS — Z02.4 ENCOUNTER FOR DRIVER'S LICENSE HISTORY AND PHYSICAL: ICD-10-CM

## 2021-10-01 DIAGNOSIS — R42 DIZZINESS: ICD-10-CM

## 2021-10-01 DIAGNOSIS — Z71.3 DIETARY COUNSELING: ICD-10-CM

## 2021-10-01 DIAGNOSIS — E78.5 DYSLIPIDEMIA: ICD-10-CM

## 2021-10-01 DIAGNOSIS — F10.120 ALCOHOL ABUSE WITH INTOXICATION, UNCOMPLICATED (HCC): ICD-10-CM

## 2021-10-01 DIAGNOSIS — E11.00 TYPE 2 DIABETES MELLITUS WITH HYPEROSMOLARITY WITHOUT COMA, WITHOUT LONG-TERM CURRENT USE OF INSULIN (HCC): Primary | ICD-10-CM

## 2021-10-01 DIAGNOSIS — F10.230 ALCOHOL WITHDRAWAL SYNDROME WITHOUT COMPLICATION (HCC): ICD-10-CM

## 2021-10-01 DIAGNOSIS — Z71.82 EXERCISE COUNSELING: ICD-10-CM

## 2021-10-01 DIAGNOSIS — R79.89 ELEVATED SERUM CREATININE: ICD-10-CM

## 2021-10-01 LAB — SL AMB POCT GLUCOSE BLD: 101

## 2021-10-01 PROCEDURE — 99213 OFFICE O/P EST LOW 20 MIN: CPT | Performed by: STUDENT IN AN ORGANIZED HEALTH CARE EDUCATION/TRAINING PROGRAM

## 2021-10-01 PROCEDURE — 80307 DRUG TEST PRSMV CHEM ANLYZR: CPT | Performed by: STUDENT IN AN ORGANIZED HEALTH CARE EDUCATION/TRAINING PROGRAM

## 2021-10-01 PROCEDURE — 82948 REAGENT STRIP/BLOOD GLUCOSE: CPT | Performed by: STUDENT IN AN ORGANIZED HEALTH CARE EDUCATION/TRAINING PROGRAM

## 2021-10-04 LAB
AMPHETAMINES UR QL SCN: NEGATIVE NG/ML
BARBITURATES UR QL SCN: NEGATIVE NG/ML
BENZODIAZ UR QL: NEGATIVE NG/ML
BZE UR QL: NEGATIVE NG/ML
CANNABINOIDS UR QL SCN: NEGATIVE NG/ML
METHADONE UR QL SCN: NEGATIVE NG/ML
OPIATES UR QL: NEGATIVE NG/ML
PCP UR QL: NEGATIVE NG/ML
PROPOXYPH UR QL SCN: NEGATIVE NG/ML

## 2021-10-13 ENCOUNTER — PATIENT OUTREACH (OUTPATIENT)
Dept: FAMILY MEDICINE CLINIC | Facility: CLINIC | Age: 61
End: 2021-10-13

## 2021-10-18 ENCOUNTER — PATIENT OUTREACH (OUTPATIENT)
Dept: FAMILY MEDICINE CLINIC | Facility: CLINIC | Age: 61
End: 2021-10-18

## 2021-10-24 ENCOUNTER — APPOINTMENT (EMERGENCY)
Dept: RADIOLOGY | Facility: HOSPITAL | Age: 61
End: 2021-10-24
Payer: MEDICARE

## 2021-10-24 ENCOUNTER — HOSPITAL ENCOUNTER (OUTPATIENT)
Facility: HOSPITAL | Age: 61
Setting detail: OBSERVATION
Discharge: HOME | End: 2021-10-26
Attending: EMERGENCY MEDICINE | Admitting: FAMILY MEDICINE
Payer: MEDICARE

## 2021-10-24 DIAGNOSIS — E83.51 HYPOCALCEMIA: ICD-10-CM

## 2021-10-24 DIAGNOSIS — E83.42 HYPOMAGNESEMIA: ICD-10-CM

## 2021-10-24 DIAGNOSIS — G93.40 ENCEPHALOPATHY: ICD-10-CM

## 2021-10-24 DIAGNOSIS — N17.9 AKI (ACUTE KIDNEY INJURY) (HCC): Primary | ICD-10-CM

## 2021-10-24 PROBLEM — I50.32 CHRONIC HEART FAILURE WITH PRESERVED EJECTION FRACTION (HCC): Status: ACTIVE | Noted: 2019-12-22

## 2021-10-24 PROBLEM — Z78.9 POOR HISTORIAN: Status: ACTIVE | Noted: 2021-10-24

## 2021-10-24 LAB
ALBUMIN SERPL BCP-MCNC: 3.9 G/DL (ref 3.5–5)
ALP SERPL-CCNC: 79 U/L (ref 46–116)
ALT SERPL W P-5'-P-CCNC: 61 U/L (ref 12–78)
ANION GAP SERPL CALCULATED.3IONS-SCNC: 11 MMOL/L (ref 4–13)
AST SERPL W P-5'-P-CCNC: 60 U/L (ref 5–45)
BASOPHILS # BLD AUTO: 0.03 THOUSANDS/ΜL (ref 0–0.1)
BASOPHILS NFR BLD AUTO: 1 % (ref 0–1)
BILIRUB SERPL-MCNC: 0.78 MG/DL (ref 0.2–1)
BUN SERPL-MCNC: 34 MG/DL (ref 5–25)
CA-I BLD-SCNC: 1.1 MMOL/L (ref 1.12–1.32)
CALCIUM SERPL-MCNC: 9.1 MG/DL (ref 8.3–10.1)
CHLORIDE SERPL-SCNC: 94 MMOL/L (ref 100–108)
CO2 SERPL-SCNC: 27 MMOL/L (ref 21–32)
CREAT SERPL-MCNC: 2.2 MG/DL (ref 0.6–1.3)
EOSINOPHIL # BLD AUTO: 0.14 THOUSAND/ΜL (ref 0–0.61)
EOSINOPHIL NFR BLD AUTO: 3 % (ref 0–6)
ERYTHROCYTE [DISTWIDTH] IN BLOOD BY AUTOMATED COUNT: 15.7 % (ref 11.6–15.1)
ETHANOL SERPL-MCNC: <3 MG/DL (ref 0–3)
GFR SERPL CREATININE-BSD FRML MDRD: 32 ML/MIN/1.73SQ M
GLUCOSE SERPL-MCNC: 119 MG/DL (ref 65–140)
HCT VFR BLD AUTO: 35.5 % (ref 36.5–49.3)
HGB BLD-MCNC: 12.2 G/DL (ref 12–17)
LYMPHOCYTES # BLD AUTO: 1.41 THOUSANDS/ΜL (ref 0.6–4.47)
LYMPHOCYTES NFR BLD AUTO: 25 % (ref 14–44)
MAGNESIUM SERPL-MCNC: 1.4 MG/DL (ref 1.6–2.6)
MCH RBC QN AUTO: 32.3 PG (ref 26.8–34.3)
MCHC RBC AUTO-ENTMCNC: 34.4 G/DL (ref 31.4–37.4)
MCV RBC AUTO: 94 FL (ref 82–98)
MONOCYTES # BLD AUTO: 0.9 THOUSAND/ΜL (ref 0.17–1.22)
MONOCYTES NFR BLD AUTO: 16 % (ref 4–12)
NEUTROPHILS # BLD AUTO: 3.23 THOUSANDS/ΜL (ref 1.85–7.62)
NEUTS SEG NFR BLD AUTO: 55 % (ref 43–75)
PLATELET # BLD AUTO: 50 THOUSANDS/UL (ref 149–390)
PMV BLD AUTO: 10.7 FL (ref 8.9–12.7)
POTASSIUM SERPL-SCNC: 4.1 MMOL/L (ref 3.5–5.3)
PROT SERPL-MCNC: 7.7 G/DL (ref 6.4–8.2)
RBC # BLD AUTO: 3.78 MILLION/UL (ref 3.88–5.62)
SODIUM SERPL-SCNC: 132 MMOL/L (ref 136–145)
WBC # BLD AUTO: 5.71 THOUSAND/UL (ref 4.31–10.16)

## 2021-10-24 PROCEDURE — 96365 THER/PROPH/DIAG IV INF INIT: CPT

## 2021-10-24 PROCEDURE — 82077 ASSAY SPEC XCP UR&BREATH IA: CPT | Performed by: EMERGENCY MEDICINE

## 2021-10-24 PROCEDURE — 99285 EMERGENCY DEPT VISIT HI MDM: CPT

## 2021-10-24 PROCEDURE — 80053 COMPREHEN METABOLIC PANEL: CPT | Performed by: EMERGENCY MEDICINE

## 2021-10-24 PROCEDURE — 96361 HYDRATE IV INFUSION ADD-ON: CPT

## 2021-10-24 PROCEDURE — 83735 ASSAY OF MAGNESIUM: CPT | Performed by: EMERGENCY MEDICINE

## 2021-10-24 PROCEDURE — 82330 ASSAY OF CALCIUM: CPT | Performed by: EMERGENCY MEDICINE

## 2021-10-24 PROCEDURE — 82728 ASSAY OF FERRITIN: CPT | Performed by: STUDENT IN AN ORGANIZED HEALTH CARE EDUCATION/TRAINING PROGRAM

## 2021-10-24 PROCEDURE — 85025 COMPLETE CBC W/AUTO DIFF WBC: CPT | Performed by: EMERGENCY MEDICINE

## 2021-10-24 PROCEDURE — 83550 IRON BINDING TEST: CPT | Performed by: STUDENT IN AN ORGANIZED HEALTH CARE EDUCATION/TRAINING PROGRAM

## 2021-10-24 PROCEDURE — 93005 ELECTROCARDIOGRAM TRACING: CPT

## 2021-10-24 PROCEDURE — 83540 ASSAY OF IRON: CPT | Performed by: STUDENT IN AN ORGANIZED HEALTH CARE EDUCATION/TRAINING PROGRAM

## 2021-10-24 PROCEDURE — 70450 CT HEAD/BRAIN W/O DYE: CPT

## 2021-10-24 PROCEDURE — 84443 ASSAY THYROID STIM HORMONE: CPT | Performed by: STUDENT IN AN ORGANIZED HEALTH CARE EDUCATION/TRAINING PROGRAM

## 2021-10-24 PROCEDURE — 36415 COLL VENOUS BLD VENIPUNCTURE: CPT | Performed by: STUDENT IN AN ORGANIZED HEALTH CARE EDUCATION/TRAINING PROGRAM

## 2021-10-24 RX ORDER — MAGNESIUM SULFATE HEPTAHYDRATE 40 MG/ML
2 INJECTION, SOLUTION INTRAVENOUS ONCE
Status: COMPLETED | OUTPATIENT
Start: 2021-10-24 | End: 2021-10-25

## 2021-10-24 RX ORDER — DIAZEPAM 5 MG/1
10 TABLET ORAL ONCE
Status: COMPLETED | OUTPATIENT
Start: 2021-10-24 | End: 2021-10-24

## 2021-10-24 RX ORDER — CALCIUM GLUCONATE 20 MG/ML
1 INJECTION, SOLUTION INTRAVENOUS ONCE
Status: COMPLETED | OUTPATIENT
Start: 2021-10-24 | End: 2021-10-25

## 2021-10-24 RX ORDER — LANOLIN ALCOHOL/MO/W.PET/CERES
800 CREAM (GRAM) TOPICAL DAILY
Status: DISCONTINUED | OUTPATIENT
Start: 2021-10-25 | End: 2021-10-25

## 2021-10-24 RX ADMIN — Medication 1 TABLET: at 22:21

## 2021-10-24 RX ADMIN — SODIUM CHLORIDE 1000 ML: 0.9 INJECTION, SOLUTION INTRAVENOUS at 22:30

## 2021-10-24 RX ADMIN — DIAZEPAM 10 MG: 5 TABLET ORAL at 22:21

## 2021-10-24 RX ADMIN — THIAMINE HYDROCHLORIDE 500 MG: 100 INJECTION, SOLUTION INTRAMUSCULAR; INTRAVENOUS at 22:30

## 2021-10-25 PROBLEM — R42 LIGHTHEADEDNESS: Status: ACTIVE | Noted: 2021-10-25

## 2021-10-25 PROBLEM — I63.9 STROKE (HCC): Status: ACTIVE | Noted: 2021-10-25

## 2021-10-25 PROBLEM — Z86.79 HISTORY OF ATRIAL FIBRILLATION: Status: ACTIVE | Noted: 2021-10-25

## 2021-10-25 LAB
ALBUMIN SERPL BCP-MCNC: 3.3 G/DL (ref 3.5–5)
ALP SERPL-CCNC: 69 U/L (ref 46–116)
ALT SERPL W P-5'-P-CCNC: 47 U/L (ref 12–78)
AMPHETAMINES SERPL QL SCN: NEGATIVE
ANION GAP SERPL CALCULATED.3IONS-SCNC: 6 MMOL/L (ref 4–13)
AST SERPL W P-5'-P-CCNC: 47 U/L (ref 5–45)
BARBITURATES UR QL: NEGATIVE
BASOPHILS # BLD AUTO: 0.03 THOUSANDS/ΜL (ref 0–0.1)
BASOPHILS NFR BLD AUTO: 1 % (ref 0–1)
BENZODIAZ UR QL: NEGATIVE
BILIRUB SERPL-MCNC: 0.68 MG/DL (ref 0.2–1)
BILIRUB UR QL STRIP: NEGATIVE
BUN SERPL-MCNC: 26 MG/DL (ref 5–25)
CALCIUM ALBUM COR SERPL-MCNC: 9.8 MG/DL (ref 8.3–10.1)
CALCIUM SERPL-MCNC: 9.2 MG/DL (ref 8.3–10.1)
CHLORIDE SERPL-SCNC: 99 MMOL/L (ref 100–108)
CLARITY UR: CLEAR
CO2 SERPL-SCNC: 30 MMOL/L (ref 21–32)
COCAINE UR QL: NEGATIVE
COLOR UR: NORMAL
CREAT SERPL-MCNC: 1.52 MG/DL (ref 0.6–1.3)
EOSINOPHIL # BLD AUTO: 0.21 THOUSAND/ΜL (ref 0–0.61)
EOSINOPHIL NFR BLD AUTO: 4 % (ref 0–6)
ERYTHROCYTE [DISTWIDTH] IN BLOOD BY AUTOMATED COUNT: 15.9 % (ref 11.6–15.1)
FERRITIN SERPL-MCNC: 138 NG/ML (ref 8–388)
GFR SERPL CREATININE-BSD FRML MDRD: 49 ML/MIN/1.73SQ M
GLUCOSE SERPL-MCNC: 103 MG/DL (ref 65–140)
GLUCOSE SERPL-MCNC: 105 MG/DL (ref 65–140)
GLUCOSE SERPL-MCNC: 111 MG/DL (ref 65–140)
GLUCOSE SERPL-MCNC: 126 MG/DL (ref 65–140)
GLUCOSE SERPL-MCNC: 97 MG/DL (ref 65–140)
GLUCOSE UR STRIP-MCNC: NEGATIVE MG/DL
HCT VFR BLD AUTO: 35.9 % (ref 36.5–49.3)
HGB BLD-MCNC: 11.9 G/DL (ref 12–17)
HGB UR QL STRIP.AUTO: NEGATIVE
IMM GRANULOCYTES # BLD AUTO: 0.01 THOUSAND/UL (ref 0–0.2)
IMM GRANULOCYTES NFR BLD AUTO: 0 % (ref 0–2)
IRON SATN MFR SERPL: 16 % (ref 20–50)
IRON SERPL-MCNC: 45 UG/DL (ref 65–175)
KETONES UR STRIP-MCNC: NEGATIVE MG/DL
LEUKOCYTE ESTERASE UR QL STRIP: NEGATIVE
LYMPHOCYTES # BLD AUTO: 1.16 THOUSANDS/ΜL (ref 0.6–4.47)
LYMPHOCYTES NFR BLD AUTO: 25 % (ref 14–44)
MAGNESIUM SERPL-MCNC: 1.8 MG/DL (ref 1.6–2.6)
MCH RBC QN AUTO: 32.2 PG (ref 26.8–34.3)
MCHC RBC AUTO-ENTMCNC: 33.1 G/DL (ref 31.4–37.4)
MCV RBC AUTO: 97 FL (ref 82–98)
METHADONE UR QL: NEGATIVE
MONOCYTES # BLD AUTO: 0.61 THOUSAND/ΜL (ref 0.17–1.22)
MONOCYTES NFR BLD AUTO: 13 % (ref 4–12)
NEUTROPHILS # BLD AUTO: 2.72 THOUSANDS/ΜL (ref 1.85–7.62)
NEUTS SEG NFR BLD AUTO: 57 % (ref 43–75)
NITRITE UR QL STRIP: NEGATIVE
NRBC BLD AUTO-RTO: 0 /100 WBCS
OPIATES UR QL SCN: NEGATIVE
OXYCODONE+OXYMORPHONE UR QL SCN: NEGATIVE
PCP UR QL: NEGATIVE
PH UR STRIP.AUTO: 7 [PH]
PHOSPHATE SERPL-MCNC: 3.5 MG/DL (ref 2.7–4.5)
PLATELET # BLD AUTO: 42 THOUSANDS/UL (ref 149–390)
PMV BLD AUTO: 10.8 FL (ref 8.9–12.7)
POTASSIUM SERPL-SCNC: 4.1 MMOL/L (ref 3.5–5.3)
PROT SERPL-MCNC: 6.8 G/DL (ref 6.4–8.2)
PROT UR STRIP-MCNC: NEGATIVE MG/DL
RBC # BLD AUTO: 3.7 MILLION/UL (ref 3.88–5.62)
SODIUM SERPL-SCNC: 135 MMOL/L (ref 136–145)
SP GR UR STRIP.AUTO: 1.01 (ref 1–1.03)
THC UR QL: NEGATIVE
TIBC SERPL-MCNC: 282 UG/DL (ref 250–450)
TSH SERPL DL<=0.05 MIU/L-ACNC: 0.91 UIU/ML (ref 0.36–3.74)
UROBILINOGEN UR QL STRIP.AUTO: 0.2 E.U./DL
WBC # BLD AUTO: 4.74 THOUSAND/UL (ref 4.31–10.16)

## 2021-10-25 PROCEDURE — 84100 ASSAY OF PHOSPHORUS: CPT

## 2021-10-25 PROCEDURE — 80053 COMPREHEN METABOLIC PANEL: CPT

## 2021-10-25 PROCEDURE — 82948 REAGENT STRIP/BLOOD GLUCOSE: CPT

## 2021-10-25 PROCEDURE — 85025 COMPLETE CBC W/AUTO DIFF WBC: CPT

## 2021-10-25 PROCEDURE — 82746 ASSAY OF FOLIC ACID SERUM: CPT | Performed by: STUDENT IN AN ORGANIZED HEALTH CARE EDUCATION/TRAINING PROGRAM

## 2021-10-25 PROCEDURE — 83735 ASSAY OF MAGNESIUM: CPT

## 2021-10-25 PROCEDURE — 80307 DRUG TEST PRSMV CHEM ANLYZR: CPT

## 2021-10-25 PROCEDURE — 99220 PR INITIAL OBSERVATION CARE/DAY 70 MINUTES: CPT | Performed by: FAMILY MEDICINE

## 2021-10-25 PROCEDURE — 82607 VITAMIN B-12: CPT | Performed by: STUDENT IN AN ORGANIZED HEALTH CARE EDUCATION/TRAINING PROGRAM

## 2021-10-25 PROCEDURE — 99285 EMERGENCY DEPT VISIT HI MDM: CPT | Performed by: EMERGENCY MEDICINE

## 2021-10-25 RX ORDER — ACETAMINOPHEN 325 MG/1
650 TABLET ORAL EVERY 6 HOURS PRN
Status: DISCONTINUED | OUTPATIENT
Start: 2021-10-25 | End: 2021-10-26 | Stop reason: HOSPADM

## 2021-10-25 RX ORDER — FLUTICASONE FUROATE AND VILANTEROL 200; 25 UG/1; UG/1
1 POWDER RESPIRATORY (INHALATION) EVERY 12 HOURS
Status: DISCONTINUED | OUTPATIENT
Start: 2021-10-25 | End: 2021-10-25

## 2021-10-25 RX ORDER — ALBUTEROL SULFATE 90 UG/1
2 AEROSOL, METERED RESPIRATORY (INHALATION) EVERY 4 HOURS PRN
Status: DISCONTINUED | OUTPATIENT
Start: 2021-10-25 | End: 2021-10-26 | Stop reason: HOSPADM

## 2021-10-25 RX ORDER — AMLODIPINE BESYLATE 5 MG/1
5 TABLET ORAL DAILY
Status: DISCONTINUED | OUTPATIENT
Start: 2021-10-25 | End: 2021-10-26 | Stop reason: HOSPADM

## 2021-10-25 RX ORDER — GABAPENTIN 300 MG/1
300 CAPSULE ORAL 3 TIMES DAILY
Status: DISCONTINUED | OUTPATIENT
Start: 2021-10-25 | End: 2021-10-26 | Stop reason: HOSPADM

## 2021-10-25 RX ORDER — PRAVASTATIN SODIUM 40 MG
40 TABLET ORAL
Status: DISCONTINUED | OUTPATIENT
Start: 2021-10-26 | End: 2021-10-26 | Stop reason: HOSPADM

## 2021-10-25 RX ORDER — FOLIC ACID 1 MG/1
1000 TABLET ORAL DAILY
Status: DISCONTINUED | OUTPATIENT
Start: 2021-10-25 | End: 2021-10-25 | Stop reason: SDUPTHER

## 2021-10-25 RX ORDER — FOLIC ACID 1 MG/1
1 TABLET ORAL DAILY
Status: DISCONTINUED | OUTPATIENT
Start: 2021-10-25 | End: 2021-10-26 | Stop reason: HOSPADM

## 2021-10-25 RX ORDER — LANOLIN ALCOHOL/MO/W.PET/CERES
100 CREAM (GRAM) TOPICAL DAILY
Status: DISCONTINUED | OUTPATIENT
Start: 2021-10-25 | End: 2021-10-26 | Stop reason: HOSPADM

## 2021-10-25 RX ORDER — TAMSULOSIN HYDROCHLORIDE 0.4 MG/1
0.4 CAPSULE ORAL DAILY
Status: DISCONTINUED | OUTPATIENT
Start: 2021-10-25 | End: 2021-10-26 | Stop reason: HOSPADM

## 2021-10-25 RX ORDER — PANTOPRAZOLE SODIUM 20 MG/1
20 TABLET, DELAYED RELEASE ORAL
Status: DISCONTINUED | OUTPATIENT
Start: 2021-10-25 | End: 2021-10-26 | Stop reason: HOSPADM

## 2021-10-25 RX ORDER — DOCUSATE SODIUM 100 MG/1
100 CAPSULE, LIQUID FILLED ORAL 2 TIMES DAILY
Status: DISCONTINUED | OUTPATIENT
Start: 2021-10-25 | End: 2021-10-26 | Stop reason: HOSPADM

## 2021-10-25 RX ORDER — RANOLAZINE 500 MG/1
500 TABLET, EXTENDED RELEASE ORAL EVERY 12 HOURS
Status: DISCONTINUED | OUTPATIENT
Start: 2021-10-25 | End: 2021-10-26 | Stop reason: HOSPADM

## 2021-10-25 RX ORDER — FLUTICASONE FUROATE AND VILANTEROL 200; 25 UG/1; UG/1
1 POWDER RESPIRATORY (INHALATION) DAILY
Status: DISCONTINUED | OUTPATIENT
Start: 2021-10-25 | End: 2021-10-26 | Stop reason: HOSPADM

## 2021-10-25 RX ORDER — FERROUS SULFATE 325(65) MG
325 TABLET ORAL
Status: DISCONTINUED | OUTPATIENT
Start: 2021-10-26 | End: 2021-10-26 | Stop reason: HOSPADM

## 2021-10-25 RX ORDER — CALCIUM CARBONATE 200(500)MG
1000 TABLET,CHEWABLE ORAL DAILY PRN
Status: DISCONTINUED | OUTPATIENT
Start: 2021-10-25 | End: 2021-10-26 | Stop reason: HOSPADM

## 2021-10-25 RX ORDER — LANOLIN ALCOHOL/MO/W.PET/CERES
100 CREAM (GRAM) TOPICAL DAILY
Status: DISCONTINUED | OUTPATIENT
Start: 2021-10-25 | End: 2021-10-25 | Stop reason: SDUPTHER

## 2021-10-25 RX ORDER — SODIUM CHLORIDE 9 MG/ML
100 INJECTION, SOLUTION INTRAVENOUS CONTINUOUS
Status: DISCONTINUED | OUTPATIENT
Start: 2021-10-25 | End: 2021-10-26 | Stop reason: HOSPADM

## 2021-10-25 RX ORDER — NICOTINE 21 MG/24HR
1 PATCH, TRANSDERMAL 24 HOURS TRANSDERMAL DAILY
Status: DISCONTINUED | OUTPATIENT
Start: 2021-10-25 | End: 2021-10-26 | Stop reason: HOSPADM

## 2021-10-25 RX ADMIN — GABAPENTIN 300 MG: 300 CAPSULE ORAL at 16:50

## 2021-10-25 RX ADMIN — MAGNESIUM OXIDE TAB 400 MG (241.3 MG ELEMENTAL MG) 400 MG: 400 (241.3 MG) TAB at 18:03

## 2021-10-25 RX ADMIN — CALCIUM GLUCONATE 1 G: 20 INJECTION, SOLUTION INTRAVENOUS at 01:52

## 2021-10-25 RX ADMIN — MAGNESIUM SULFATE HEPTAHYDRATE 2 G: 40 INJECTION, SOLUTION INTRAVENOUS at 00:34

## 2021-10-25 RX ADMIN — METOPROLOL TARTRATE 25 MG: 25 TABLET, FILM COATED ORAL at 18:03

## 2021-10-25 RX ADMIN — MAGNESIUM OXIDE TAB 400 MG (241.3 MG ELEMENTAL MG) 400 MG: 400 (241.3 MG) TAB at 09:53

## 2021-10-25 RX ADMIN — GABAPENTIN 300 MG: 300 CAPSULE ORAL at 09:53

## 2021-10-25 RX ADMIN — DOCUSATE SODIUM 100 MG: 100 CAPSULE, LIQUID FILLED ORAL at 18:03

## 2021-10-25 RX ADMIN — METOPROLOL TARTRATE 25 MG: 25 TABLET, FILM COATED ORAL at 09:53

## 2021-10-25 RX ADMIN — SODIUM CHLORIDE 100 ML/HR: 0.9 INJECTION, SOLUTION INTRAVENOUS at 06:41

## 2021-10-25 RX ADMIN — DOCUSATE SODIUM 100 MG: 100 CAPSULE, LIQUID FILLED ORAL at 09:53

## 2021-10-25 RX ADMIN — FOLIC ACID 1 MG: 1 TABLET ORAL at 09:53

## 2021-10-25 RX ADMIN — RANOLAZINE 500 MG: 500 TABLET, FILM COATED, EXTENDED RELEASE ORAL at 18:03

## 2021-10-25 RX ADMIN — SODIUM CHLORIDE 100 ML/HR: 0.9 INJECTION, SOLUTION INTRAVENOUS at 18:02

## 2021-10-25 RX ADMIN — FLUTICASONE FUROATE AND VILANTEROL TRIFENATATE 1 PUFF: 200; 25 POWDER RESPIRATORY (INHALATION) at 13:59

## 2021-10-25 RX ADMIN — TAMSULOSIN HYDROCHLORIDE 0.4 MG: 0.4 CAPSULE ORAL at 09:57

## 2021-10-25 RX ADMIN — RANOLAZINE 500 MG: 500 TABLET, FILM COATED, EXTENDED RELEASE ORAL at 09:57

## 2021-10-25 RX ADMIN — THIAMINE HCL TAB 100 MG 100 MG: 100 TAB at 09:53

## 2021-10-25 RX ADMIN — AMLODIPINE BESYLATE 5 MG: 5 TABLET ORAL at 09:53

## 2021-10-25 RX ADMIN — GABAPENTIN 300 MG: 300 CAPSULE ORAL at 21:11

## 2021-10-26 ENCOUNTER — APPOINTMENT (OUTPATIENT)
Dept: RADIOLOGY | Facility: HOSPITAL | Age: 61
End: 2021-10-26
Payer: MEDICARE

## 2021-10-26 VITALS
HEART RATE: 75 BPM | OXYGEN SATURATION: 95 % | RESPIRATION RATE: 18 BRPM | DIASTOLIC BLOOD PRESSURE: 74 MMHG | WEIGHT: 183.7 LBS | BODY MASS INDEX: 23.57 KG/M2 | HEIGHT: 74 IN | TEMPERATURE: 97.2 F | SYSTOLIC BLOOD PRESSURE: 134 MMHG

## 2021-10-26 LAB
ALBUMIN SERPL BCP-MCNC: 3.1 G/DL (ref 3.5–5)
ALP SERPL-CCNC: 58 U/L (ref 46–116)
ALT SERPL W P-5'-P-CCNC: 40 U/L (ref 12–78)
ANION GAP SERPL CALCULATED.3IONS-SCNC: 9 MMOL/L (ref 4–13)
AST SERPL W P-5'-P-CCNC: 33 U/L (ref 5–45)
BASOPHILS # BLD AUTO: 0.04 THOUSANDS/ΜL (ref 0–0.1)
BASOPHILS NFR BLD AUTO: 1 % (ref 0–1)
BILIRUB SERPL-MCNC: 0.55 MG/DL (ref 0.2–1)
BUN SERPL-MCNC: 20 MG/DL (ref 5–25)
CALCIUM ALBUM COR SERPL-MCNC: 9.6 MG/DL (ref 8.3–10.1)
CALCIUM SERPL-MCNC: 8.9 MG/DL (ref 8.3–10.1)
CHLORIDE SERPL-SCNC: 101 MMOL/L (ref 100–108)
CHOLEST SERPL-MCNC: 193 MG/DL (ref 50–200)
CO2 SERPL-SCNC: 25 MMOL/L (ref 21–32)
CREAT SERPL-MCNC: 1.15 MG/DL (ref 0.6–1.3)
EOSINOPHIL # BLD AUTO: 0.24 THOUSAND/ΜL (ref 0–0.61)
EOSINOPHIL NFR BLD AUTO: 4 % (ref 0–6)
ERYTHROCYTE [DISTWIDTH] IN BLOOD BY AUTOMATED COUNT: 15.9 % (ref 11.6–15.1)
EST. AVERAGE GLUCOSE BLD GHB EST-MCNC: 97 MG/DL
FOLATE SERPL-MCNC: >20 NG/ML (ref 3.1–17.5)
GFR SERPL CREATININE-BSD FRML MDRD: 69 ML/MIN/1.73SQ M
GLUCOSE SERPL-MCNC: 103 MG/DL (ref 65–140)
GLUCOSE SERPL-MCNC: 107 MG/DL (ref 65–140)
GLUCOSE SERPL-MCNC: 97 MG/DL (ref 65–140)
HBA1C MFR BLD: 5 %
HCT VFR BLD AUTO: 35.3 % (ref 36.5–49.3)
HDLC SERPL-MCNC: 91 MG/DL
HGB BLD-MCNC: 11.5 G/DL (ref 12–17)
IMM GRANULOCYTES # BLD AUTO: 0.02 THOUSAND/UL (ref 0–0.2)
IMM GRANULOCYTES NFR BLD AUTO: 0 % (ref 0–2)
LDLC SERPL CALC-MCNC: 91 MG/DL (ref 0–100)
LYMPHOCYTES # BLD AUTO: 1.33 THOUSANDS/ΜL (ref 0.6–4.47)
LYMPHOCYTES NFR BLD AUTO: 24 % (ref 14–44)
MAGNESIUM SERPL-MCNC: 1.5 MG/DL (ref 1.6–2.6)
MCH RBC QN AUTO: 31.9 PG (ref 26.8–34.3)
MCHC RBC AUTO-ENTMCNC: 32.6 G/DL (ref 31.4–37.4)
MCV RBC AUTO: 98 FL (ref 82–98)
MONOCYTES # BLD AUTO: 0.78 THOUSAND/ΜL (ref 0.17–1.22)
MONOCYTES NFR BLD AUTO: 14 % (ref 4–12)
NEUTROPHILS # BLD AUTO: 3.2 THOUSANDS/ΜL (ref 1.85–7.62)
NEUTS SEG NFR BLD AUTO: 57 % (ref 43–75)
NONHDLC SERPL-MCNC: 102 MG/DL
NRBC BLD AUTO-RTO: 0 /100 WBCS
PHOSPHATE SERPL-MCNC: 3.3 MG/DL (ref 2.7–4.5)
PLATELET # BLD AUTO: 45 THOUSANDS/UL (ref 149–390)
PMV BLD AUTO: 11 FL (ref 8.9–12.7)
POTASSIUM SERPL-SCNC: 4 MMOL/L (ref 3.5–5.3)
PROT SERPL-MCNC: 6.5 G/DL (ref 6.4–8.2)
RBC # BLD AUTO: 3.6 MILLION/UL (ref 3.88–5.62)
SODIUM SERPL-SCNC: 135 MMOL/L (ref 136–145)
TRIGL SERPL-MCNC: 53 MG/DL
VIT B12 SERPL-MCNC: 545 PG/ML (ref 100–900)
WBC # BLD AUTO: 5.61 THOUSAND/UL (ref 4.31–10.16)

## 2021-10-26 PROCEDURE — 84100 ASSAY OF PHOSPHORUS: CPT

## 2021-10-26 PROCEDURE — 80061 LIPID PANEL: CPT | Performed by: FAMILY MEDICINE

## 2021-10-26 PROCEDURE — 83036 HEMOGLOBIN GLYCOSYLATED A1C: CPT | Performed by: FAMILY MEDICINE

## 2021-10-26 PROCEDURE — 80053 COMPREHEN METABOLIC PANEL: CPT

## 2021-10-26 PROCEDURE — 82948 REAGENT STRIP/BLOOD GLUCOSE: CPT

## 2021-10-26 PROCEDURE — 93880 EXTRACRANIAL BILAT STUDY: CPT

## 2021-10-26 PROCEDURE — 93880 EXTRACRANIAL BILAT STUDY: CPT | Performed by: SURGERY

## 2021-10-26 PROCEDURE — 83735 ASSAY OF MAGNESIUM: CPT

## 2021-10-26 PROCEDURE — 85025 COMPLETE CBC W/AUTO DIFF WBC: CPT

## 2021-10-26 RX ORDER — MAGNESIUM SULFATE HEPTAHYDRATE 40 MG/ML
4 INJECTION, SOLUTION INTRAVENOUS ONCE
Status: COMPLETED | OUTPATIENT
Start: 2021-10-26 | End: 2021-10-26

## 2021-10-26 RX ADMIN — METOPROLOL TARTRATE 25 MG: 25 TABLET, FILM COATED ORAL at 08:55

## 2021-10-26 RX ADMIN — THIAMINE HCL TAB 100 MG 100 MG: 100 TAB at 08:53

## 2021-10-26 RX ADMIN — TAMSULOSIN HYDROCHLORIDE 0.4 MG: 0.4 CAPSULE ORAL at 08:54

## 2021-10-26 RX ADMIN — FLUTICASONE FUROATE AND VILANTEROL TRIFENATATE 1 PUFF: 200; 25 POWDER RESPIRATORY (INHALATION) at 08:56

## 2021-10-26 RX ADMIN — PANTOPRAZOLE SODIUM 20 MG: 40 TABLET, DELAYED RELEASE ORAL at 05:20

## 2021-10-26 RX ADMIN — GABAPENTIN 300 MG: 300 CAPSULE ORAL at 08:55

## 2021-10-26 RX ADMIN — MAGNESIUM OXIDE TAB 400 MG (241.3 MG ELEMENTAL MG) 400 MG: 400 (241.3 MG) TAB at 08:54

## 2021-10-26 RX ADMIN — SODIUM CHLORIDE 100 ML/HR: 0.9 INJECTION, SOLUTION INTRAVENOUS at 02:19

## 2021-10-26 RX ADMIN — FOLIC ACID 1 MG: 1 TABLET ORAL at 08:54

## 2021-10-26 RX ADMIN — AMLODIPINE BESYLATE 5 MG: 5 TABLET ORAL at 08:54

## 2021-10-26 RX ADMIN — DOCUSATE SODIUM 100 MG: 100 CAPSULE, LIQUID FILLED ORAL at 08:55

## 2021-10-26 RX ADMIN — MAGNESIUM SULFATE HEPTAHYDRATE 4 G: 40 INJECTION, SOLUTION INTRAVENOUS at 08:57

## 2021-10-26 RX ADMIN — FERROUS SULFATE TAB 325 MG (65 MG ELEMENTAL FE) 325 MG: 325 (65 FE) TAB at 08:54

## 2021-10-26 RX ADMIN — RANOLAZINE 500 MG: 500 TABLET, FILM COATED, EXTENDED RELEASE ORAL at 08:54

## 2021-10-28 ENCOUNTER — TRANSITIONAL CARE MANAGEMENT (OUTPATIENT)
Dept: FAMILY MEDICINE CLINIC | Facility: CLINIC | Age: 61
End: 2021-10-28

## 2021-11-10 DIAGNOSIS — I10 ESSENTIAL (PRIMARY) HYPERTENSION: Chronic | ICD-10-CM

## 2021-11-10 RX ORDER — LISINOPRIL 10 MG/1
TABLET ORAL
Qty: 90 TABLET | Refills: 0 | Status: SHIPPED | OUTPATIENT
Start: 2021-11-10 | End: 2022-04-26 | Stop reason: SDUPTHER

## 2021-11-11 LAB
ATRIAL RATE: 87 BPM
P AXIS: 63 DEGREES
PR INTERVAL: 128 MS
QRS AXIS: 73 DEGREES
QRSD INTERVAL: 92 MS
QT INTERVAL: 362 MS
QTC INTERVAL: 435 MS
T WAVE AXIS: 43 DEGREES
VENTRICULAR RATE: 87 BPM

## 2021-11-11 PROCEDURE — 93010 ELECTROCARDIOGRAM REPORT: CPT | Performed by: INTERNAL MEDICINE

## 2021-11-19 ENCOUNTER — APPOINTMENT (OUTPATIENT)
Dept: LAB | Facility: HOSPITAL | Age: 61
End: 2021-11-19
Payer: MEDICARE

## 2021-11-19 DIAGNOSIS — R79.89 ELEVATED SERUM CREATININE: ICD-10-CM

## 2021-11-19 DIAGNOSIS — N17.9 ACUTE ON CHRONIC KIDNEY FAILURE (HCC): ICD-10-CM

## 2021-11-19 DIAGNOSIS — E78.2 MIXED HYPERLIPIDEMIA: Chronic | ICD-10-CM

## 2021-11-19 DIAGNOSIS — N18.9 ACUTE ON CHRONIC KIDNEY FAILURE (HCC): ICD-10-CM

## 2021-11-19 DIAGNOSIS — I10 ESSENTIAL (PRIMARY) HYPERTENSION: Chronic | ICD-10-CM

## 2021-11-19 DIAGNOSIS — R42 DIZZINESS: ICD-10-CM

## 2021-11-19 DIAGNOSIS — E11.00 TYPE 2 DIABETES MELLITUS WITH HYPEROSMOLARITY WITHOUT COMA, WITHOUT LONG-TERM CURRENT USE OF INSULIN (HCC): ICD-10-CM

## 2021-11-19 DIAGNOSIS — E78.5 DYSLIPIDEMIA: ICD-10-CM

## 2021-11-19 LAB
ALBUMIN SERPL BCP-MCNC: 3.6 G/DL (ref 3.5–5)
ALP SERPL-CCNC: 56 U/L (ref 46–116)
ALT SERPL W P-5'-P-CCNC: 32 U/L (ref 12–78)
ANION GAP SERPL CALCULATED.3IONS-SCNC: 9 MMOL/L (ref 4–13)
AST SERPL W P-5'-P-CCNC: 21 U/L (ref 5–45)
BASOPHILS # BLD AUTO: 0.06 THOUSANDS/ΜL (ref 0–0.1)
BASOPHILS NFR BLD AUTO: 1 % (ref 0–1)
BILIRUB SERPL-MCNC: 0.49 MG/DL (ref 0.2–1)
BUN SERPL-MCNC: 22 MG/DL (ref 5–25)
CALCIUM SERPL-MCNC: 9.1 MG/DL (ref 8.3–10.1)
CHLORIDE SERPL-SCNC: 100 MMOL/L (ref 100–108)
CHOLEST SERPL-MCNC: 191 MG/DL (ref 50–200)
CO2 SERPL-SCNC: 28 MMOL/L (ref 21–32)
CREAT SERPL-MCNC: 1.12 MG/DL (ref 0.6–1.3)
EOSINOPHIL # BLD AUTO: 0.23 THOUSAND/ΜL (ref 0–0.61)
EOSINOPHIL NFR BLD AUTO: 3 % (ref 0–6)
ERYTHROCYTE [DISTWIDTH] IN BLOOD BY AUTOMATED COUNT: 14.8 % (ref 11.6–15.1)
EST. AVERAGE GLUCOSE BLD GHB EST-MCNC: 105 MG/DL
GFR SERPL CREATININE-BSD FRML MDRD: 72 ML/MIN/1.73SQ M
GLUCOSE P FAST SERPL-MCNC: 101 MG/DL (ref 65–99)
HBA1C MFR BLD: 5.3 %
HCT VFR BLD AUTO: 37.1 % (ref 36.5–49.3)
HDLC SERPL-MCNC: 86 MG/DL
HGB BLD-MCNC: 12.2 G/DL (ref 12–17)
IMM GRANULOCYTES # BLD AUTO: 0.02 THOUSAND/UL (ref 0–0.2)
IMM GRANULOCYTES NFR BLD AUTO: 0 % (ref 0–2)
LDLC SERPL CALC-MCNC: 94 MG/DL (ref 0–100)
LYMPHOCYTES # BLD AUTO: 1.65 THOUSANDS/ΜL (ref 0.6–4.47)
LYMPHOCYTES NFR BLD AUTO: 25 % (ref 14–44)
MCH RBC QN AUTO: 32.2 PG (ref 26.8–34.3)
MCHC RBC AUTO-ENTMCNC: 32.9 G/DL (ref 31.4–37.4)
MCV RBC AUTO: 98 FL (ref 82–98)
MONOCYTES # BLD AUTO: 1.28 THOUSAND/ΜL (ref 0.17–1.22)
MONOCYTES NFR BLD AUTO: 19 % (ref 4–12)
NEUTROPHILS # BLD AUTO: 3.48 THOUSANDS/ΜL (ref 1.85–7.62)
NEUTS SEG NFR BLD AUTO: 52 % (ref 43–75)
NRBC BLD AUTO-RTO: 0 /100 WBCS
PLATELET # BLD AUTO: 149 THOUSANDS/UL (ref 149–390)
PMV BLD AUTO: 10.3 FL (ref 8.9–12.7)
POTASSIUM SERPL-SCNC: 4.1 MMOL/L (ref 3.5–5.3)
PROT SERPL-MCNC: 7.8 G/DL (ref 6.4–8.2)
RBC # BLD AUTO: 3.79 MILLION/UL (ref 3.88–5.62)
SODIUM SERPL-SCNC: 137 MMOL/L (ref 136–145)
TRIGL SERPL-MCNC: 53 MG/DL
WBC # BLD AUTO: 6.72 THOUSAND/UL (ref 4.31–10.16)

## 2021-11-19 PROCEDURE — 83036 HEMOGLOBIN GLYCOSYLATED A1C: CPT

## 2021-11-19 PROCEDURE — 85025 COMPLETE CBC W/AUTO DIFF WBC: CPT

## 2021-11-19 PROCEDURE — 36415 COLL VENOUS BLD VENIPUNCTURE: CPT

## 2021-11-19 PROCEDURE — 80053 COMPREHEN METABOLIC PANEL: CPT

## 2021-11-19 PROCEDURE — 80061 LIPID PANEL: CPT

## 2021-12-08 ENCOUNTER — APPOINTMENT (EMERGENCY)
Dept: RADIOLOGY | Facility: HOSPITAL | Age: 61
End: 2021-12-08
Payer: MEDICARE

## 2021-12-08 ENCOUNTER — HOSPITAL ENCOUNTER (EMERGENCY)
Facility: HOSPITAL | Age: 61
Discharge: HOME | End: 2021-12-08
Attending: EMERGENCY MEDICINE | Admitting: EMERGENCY MEDICINE
Payer: MEDICARE

## 2021-12-08 VITALS
OXYGEN SATURATION: 96 % | SYSTOLIC BLOOD PRESSURE: 95 MMHG | TEMPERATURE: 98 F | BODY MASS INDEX: 23.75 KG/M2 | HEART RATE: 64 BPM | DIASTOLIC BLOOD PRESSURE: 50 MMHG | RESPIRATION RATE: 18 BRPM | WEIGHT: 185 LBS

## 2021-12-08 DIAGNOSIS — S09.90XA INJURY OF HEAD, INITIAL ENCOUNTER: ICD-10-CM
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
## 2021-12-08 DIAGNOSIS — F10.929 ALCOHOL INTOXICATION (HCC): ICD-10-CM

## 2021-12-08 DIAGNOSIS — M54.2 NECK PAIN: ICD-10-CM

## 2021-12-08 LAB
ALBUMIN SERPL BCP-MCNC: 3.4 G/DL (ref 3.5–5)
ALP SERPL-CCNC: 63 U/L (ref 46–116)
ALT SERPL W P-5'-P-CCNC: 31 U/L (ref 12–78)
ANION GAP SERPL CALCULATED.3IONS-SCNC: 12 MMOL/L (ref 4–13)
AST SERPL W P-5'-P-CCNC: 29 U/L (ref 5–45)
BASOPHILS # BLD AUTO: 0.05 THOUSANDS/ΜL (ref 0–0.1)
BASOPHILS NFR BLD AUTO: 1 % (ref 0–1)
BILIRUB SERPL-MCNC: 0.28 MG/DL (ref 0.2–1)
BUN SERPL-MCNC: 30 MG/DL (ref 5–25)
CALCIUM ALBUM COR SERPL-MCNC: 9.3 MG/DL (ref 8.3–10.1)
CALCIUM SERPL-MCNC: 8.8 MG/DL (ref 8.3–10.1)
CHLORIDE SERPL-SCNC: 101 MMOL/L (ref 100–108)
CO2 SERPL-SCNC: 23 MMOL/L (ref 21–32)
CREAT SERPL-MCNC: 1.37 MG/DL (ref 0.6–1.3)
EOSINOPHIL # BLD AUTO: 0.12 THOUSAND/ΜL (ref 0–0.61)
EOSINOPHIL NFR BLD AUTO: 2 % (ref 0–6)
ERYTHROCYTE [DISTWIDTH] IN BLOOD BY AUTOMATED COUNT: 14.1 % (ref 11.6–15.1)
ETHANOL SERPL-MCNC: 234 MG/DL (ref 0–3)
GFR SERPL CREATININE-BSD FRML MDRD: 56 ML/MIN/1.73SQ M
GLUCOSE SERPL-MCNC: 123 MG/DL (ref 65–140)
HCT VFR BLD AUTO: 34.1 % (ref 36.5–49.3)
HGB BLD-MCNC: 11.3 G/DL (ref 12–17)
IMM GRANULOCYTES # BLD AUTO: 0.01 THOUSAND/UL (ref 0–0.2)
IMM GRANULOCYTES NFR BLD AUTO: 0 % (ref 0–2)
LYMPHOCYTES # BLD AUTO: 1.84 THOUSANDS/ΜL (ref 0.6–4.47)
LYMPHOCYTES NFR BLD AUTO: 28 % (ref 14–44)
MCH RBC QN AUTO: 32 PG (ref 26.8–34.3)
MCHC RBC AUTO-ENTMCNC: 33.1 G/DL (ref 31.4–37.4)
MCV RBC AUTO: 97 FL (ref 82–98)
MONOCYTES # BLD AUTO: 0.75 THOUSAND/ΜL (ref 0.17–1.22)
MONOCYTES NFR BLD AUTO: 11 % (ref 4–12)
NEUTROPHILS # BLD AUTO: 3.89 THOUSANDS/ΜL (ref 1.85–7.62)
NEUTS SEG NFR BLD AUTO: 58 % (ref 43–75)
NRBC BLD AUTO-RTO: 0 /100 WBCS
PLATELET # BLD AUTO: 109 THOUSANDS/UL (ref 149–390)
PMV BLD AUTO: 11.6 FL (ref 8.9–12.7)
POTASSIUM SERPL-SCNC: 4.5 MMOL/L (ref 3.5–5.3)
PROT SERPL-MCNC: 7.2 G/DL (ref 6.4–8.2)
RBC # BLD AUTO: 3.53 MILLION/UL (ref 3.88–5.62)
SODIUM SERPL-SCNC: 136 MMOL/L (ref 136–145)
WBC # BLD AUTO: 6.66 THOUSAND/UL (ref 4.31–10.16)

## 2021-12-08 PROCEDURE — 70450 CT HEAD/BRAIN W/O DYE: CPT

## 2021-12-08 PROCEDURE — G1004 CDSM NDSC: HCPCS

## 2021-12-08 PROCEDURE — 82077 ASSAY SPEC XCP UR&BREATH IA: CPT | Performed by: EMERGENCY MEDICINE

## 2021-12-08 PROCEDURE — 99284 EMERGENCY DEPT VISIT MOD MDM: CPT

## 2021-12-08 PROCEDURE — 99284 EMERGENCY DEPT VISIT MOD MDM: CPT | Performed by: EMERGENCY MEDICINE

## 2021-12-08 PROCEDURE — 72125 CT NECK SPINE W/O DYE: CPT

## 2021-12-08 PROCEDURE — 36415 COLL VENOUS BLD VENIPUNCTURE: CPT | Performed by: EMERGENCY MEDICINE

## 2021-12-08 PROCEDURE — 96360 HYDRATION IV INFUSION INIT: CPT

## 2021-12-08 PROCEDURE — 85025 COMPLETE CBC W/AUTO DIFF WBC: CPT | Performed by: EMERGENCY MEDICINE

## 2021-12-08 PROCEDURE — 80053 COMPREHEN METABOLIC PANEL: CPT | Performed by: EMERGENCY MEDICINE

## 2021-12-08 RX ADMIN — SODIUM CHLORIDE 1000 ML: 0.9 INJECTION, SOLUTION INTRAVENOUS at 19:23

## 2021-12-13 ENCOUNTER — PATIENT OUTREACH (OUTPATIENT)
Dept: FAMILY MEDICINE CLINIC | Facility: CLINIC | Age: 61
End: 2021-12-13

## 2021-12-27 ENCOUNTER — TELEMEDICINE (OUTPATIENT)
Dept: FAMILY MEDICINE CLINIC | Facility: CLINIC | Age: 61
End: 2021-12-27
Payer: MEDICARE

## 2021-12-27 DIAGNOSIS — U07.1 COVID-19: Primary | ICD-10-CM

## 2021-12-27 DIAGNOSIS — Z23 ENCOUNTER FOR IMMUNIZATION: ICD-10-CM

## 2021-12-27 DIAGNOSIS — Z20.822 EXPOSURE TO COVID-19 VIRUS: ICD-10-CM

## 2021-12-27 PROCEDURE — 99442 PR PHYS/QHP TELEPHONE EVALUATION 11-20 MIN: CPT | Performed by: FAMILY MEDICINE

## 2022-01-05 PROCEDURE — 87636 SARSCOV2 & INF A&B AMP PRB: CPT | Performed by: STUDENT IN AN ORGANIZED HEALTH CARE EDUCATION/TRAINING PROGRAM

## 2022-01-11 ENCOUNTER — TELEMEDICINE (OUTPATIENT)
Dept: FAMILY MEDICINE CLINIC | Facility: CLINIC | Age: 62
End: 2022-01-11
Payer: MEDICARE

## 2022-01-11 ENCOUNTER — TELEPHONE (OUTPATIENT)
Dept: FAMILY MEDICINE CLINIC | Facility: CLINIC | Age: 62
End: 2022-01-11

## 2022-01-11 DIAGNOSIS — U07.1 COVID-19: Primary | ICD-10-CM

## 2022-01-11 PROCEDURE — 99442 PR PHYS/QHP TELEPHONE EVALUATION 11-20 MIN: CPT | Performed by: STUDENT IN AN ORGANIZED HEALTH CARE EDUCATION/TRAINING PROGRAM

## 2022-01-11 NOTE — TELEPHONE ENCOUNTER
Patient would like a call back to discuss getting his Covid Booster Vaccine and Flu Vaccine  He does have questions about vaccines and when to get them   Patient can be reached at the number listed below;      [phone]

## 2022-01-11 NOTE — PROGRESS NOTES
COVID-19 Outpatient Progress Note    Assessment/Plan:    Problem List Items Addressed This Visit     None      Visit Diagnoses     [id number]    -  Primary         Patient is asymptomatic at this time besides a slight runny nose  He does not need any other resources from us  He is doing well and will make a follow-up appointment with us in a few weeks  Disposition:     I have spent 15 minutes directly with the patient  Greater than 50% of this time was spent in counseling/coordination of care regarding: risks and benefits of treatment options, instructions for management and impressions  Encounter provider Benson Runner, DO    Provider located at [address] [place] [id number]    Recent Visits  No visits were found meeting these conditions  Showing recent visits within past 7 days and meeting all other requirements  Today's Visits  Date Type Provider Dept   01/11/22 Telephone Nasra Valdez   Showing today's visits and meeting all other requirements  Future Appointments  No visits were found meeting these conditions  Showing future appointments within next 150 days and meeting all other requirements     This virtual check-in was done via telephone and he agrees to proceed  Patient agrees to participate in a virtual check in via telephone or video visit instead of presenting to the office to address urgent/immediate medical needs  Patient is aware this is a billable service  After connecting through Telephone, the patient was identified by name and date of birth  Karen Recinos was informed that this was a telemedicine visit and that the exam was being conducted confidentially over secure lines  Other methods to assure confidentiality were taken: using headset  The patient was notified the following individuals were present in the room: other doctors    Karen Recinos acknowledged consent and understanding of privacy and security of the telemedicine visit  I informed the patient that I have reviewed his record in Epic and presented the opportunity for him to ask any questions regarding the visit today  The patient agreed to participate  Verification of patient location:  Patient is located in the following state in which I hold an active license: [place]    Subjective:   Addie Knight is a 63 y o  male who has been screened for COVID-19  Symptom change since last report: resolving  Patient's symptoms include rhinorrhea  Patient denies fever, chills, fatigue, congestion, sore throat, anosmia, loss of taste, cough, shortness of breath, chest tightness, abdominal pain, nausea, vomiting, diarrhea, myalgias and headaches  - Date of symptom onset: 12/26/2021  - Date of exposure: 12/23/2021  - Date of positive COVID-19 PCR: 1/5/2022  Type of test: PCR    COVID-19 vaccination status: Fully vaccinated (primary series)    Ector Garza has been staying home and has isolated themselves in his home  He is taking care to not share personal items and     Cleaning all surfaces that are touched often?: is not  He is wearing a mask when he leaves his room  Lab Results   Component Value Date    SARSCOV2 Positive (A) 01/05/2022     Past Medical History:   Diagnosis Date    Cancer [facility])     prostate ca,had radiation    Cardiac disease     stents,then triple bypass    [facility]     Diabetes mellitus [facility]     ETOH abuse     Hx of heart artery stent     2014    Hyperlipidemia     Hypertension     Pneumonia     Prostate CA ([facility] [address] [facility]     Renal disorder     pyelonephritis    S/P CABG x 1     2004     Past Surgical History:   Procedure Laterality Date    CORONARY ARTERY BYPASS GRAFT  2004    RI ARTHRODESIS ANT INTERBODY MIN DISCECTOMY, CERVICAL BELOW C2 N/A 12/16/2020    Procedure: Anterior cervical discectomy with fusion C4-C7;  Posterior cervical decompression and fusion C2-T2;  Surgeon: Attila Laughlin Amberly Ames MD;  [facility];  Service: Neurosurgery    TONSILLECTOMY       Current Outpatient Medications   Medication Sig Dispense Refill    amLODIPine (NORVASC) 5 mg tablet TAKE 1 TABLET BY MOUTH EVERY DAY 90 tablet 2    Blood Glucose Monitoring Suppl (ONE TOUCH ULTRA MINI) w/Device KIT Use as directed  0    Breo Ellipta 200-25 MCG/INH inhaler INHALE 1 PUFF BY INHALATION ROUTE EVERY 12 HOURS  RINSE OUT YOUR MOUTH AFTER EACH USE 60 each 0    diazepam (VALIUM) 5 mg tablet Take 1 tablet (5 mg total) by mouth every 6 (six) hours as needed for anxiety (alcohol withdrawal) Do NOT mix with alcohol  15 tablet 0    folic acid (FOLVITE) 1 mg tablet TAKE 1 TABLET BY MOUTH EVERY DAY 90 tablet 2    gabapentin (NEURONTIN) 300 mg capsule TAKE 1 CAPSULE BY MOUTH 3 TIMES A DAY 90 capsule 3    lisinopril (ZESTRIL) 10 mg tablet TAKE 1 TABLET BY MOUTH EVERY DAY 90 tablet 0    magnesium Oxide (MAG-OX) 400 mg TABS Take 400 mg by mouth 2 (two) times a day      metFORMIN (GLUCOPHAGE) 1000 MG tablet TAKE 1 TABLET BY MOUTH EVERY 12 HOURS 180 tablet 3    metoprolol tartrate (LOPRESSOR) 25 mg tablet TAKE 1 TABLET BY MOUTH EVERY 12 HOURS 180 tablet 2    omeprazole (PriLOSEC) 20 mg delayed release capsule Take 1 capsule (20 mg total) by mouth daily 90 capsule 3    ONETOUCH DELICA LANCETS FINE MISC 3 (three) times a day Test  0    pravastatin (PRAVACHOL) 40 mg tablet TAKE 1 TABLET BY MOUTH EVERY DAY WITH DINNER 90 tablet 2    ranolazine (RANEXA) 500 mg 12 hr tablet TAKE 1 TABLET BY MOUTH EVERY 12 HOURS 60 tablet 3    tamsulosin (FLOMAX) 0 4 mg Take 0 4 mg by mouth daily      thiamine 100 MG tablet TAKE 1 TABLET BY MOUTH EVERY DAY 90 tablet 2    Ventolin  (90 Base) MCG/ACT inhaler INHALE 2 PUFFS EVERY 4 HOURS AS NEEDED FOR WHEEZING 18 g 0     No current facility-administered medications for this visit  No Known Allergies    Review of Systems   Constitutional: Negative for chills, fatigue and fever     HENT: Positive for rhinorrhea  Negative for congestion and sore throat  Respiratory: Negative for cough, chest tightness and shortness of breath  Gastrointestinal: Negative for abdominal pain, diarrhea, nausea and vomiting  Musculoskeletal: Negative for myalgias  Neurological: Negative for headaches  Objective: There were no vitals filed for this visit  It was my intent to perform this visit via video technology but the patient was not able to do a video connection so the visit was completed via audio telephone only  VIRTUAL VISIT DISCLAIMER    Cjpepe Mosley verbally agrees to participate in [employer]  Pt is aware that [employer] could be limited without vital signs or the ability to perform a full hands-on physical [place]ano Lozada understands he or the provider may request at any time to terminate the video visit and request the patient to seek care or treatment in person

## 2022-01-21 ENCOUNTER — IMMUNIZATIONS (OUTPATIENT)
Dept: FAMILY MEDICINE CLINIC | Facility: HOSPITAL | Age: 62
End: 2022-01-21

## 2022-01-21 DIAGNOSIS — Z23 ENCOUNTER FOR IMMUNIZATION: Primary | ICD-10-CM

## 2022-01-21 PROCEDURE — 0064A COVID-19 MODERNA VACC 0.25 ML BOOSTER: CPT

## 2022-01-21 PROCEDURE — 91306 COVID-19 MODERNA VACC 0.25 ML BOOSTER: CPT

## 2022-02-01 ENCOUNTER — HOSPITAL ENCOUNTER (EMERGENCY)
Facility: HOSPITAL | Age: 62
Discharge: HOME | End: 2022-02-01
Attending: SURGERY | Admitting: SURGERY
Payer: MEDICARE

## 2022-02-01 ENCOUNTER — DOCUMENTATION (OUTPATIENT)
Dept: ICU | Facility: HOSPITAL | Age: 62
End: 2022-02-01

## 2022-02-01 ENCOUNTER — APPOINTMENT (EMERGENCY)
Dept: CT IMAGING | Facility: HOSPITAL | Age: 62
End: 2022-02-01
Payer: MEDICARE

## 2022-02-01 ENCOUNTER — APPOINTMENT (EMERGENCY)
Dept: RADIOLOGY | Facility: HOSPITAL | Age: 62
End: 2022-02-01
Payer: MEDICARE

## 2022-02-01 VITALS
DIASTOLIC BLOOD PRESSURE: 79 MMHG | SYSTOLIC BLOOD PRESSURE: 149 MMHG | WEIGHT: 189.15 LBS | TEMPERATURE: 97.5 F | HEART RATE: 69 BPM | OXYGEN SATURATION: 95 % | RESPIRATION RATE: 16 BRPM

## 2022-02-01 DIAGNOSIS — S01.81XA FOREHEAD LACERATION: ICD-10-CM
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
## 2022-02-01 LAB
ALBUMIN SERPL BCP-MCNC: 3.6 G/DL (ref 3.5–5)
ALP SERPL-CCNC: 60 U/L (ref 46–116)
ALT SERPL W P-5'-P-CCNC: 33 U/L (ref 12–78)
ANION GAP SERPL CALCULATED.3IONS-SCNC: 13 MMOL/L (ref 4–13)
APTT PPP: 28 SECONDS (ref 23–37)
AST SERPL W P-5'-P-CCNC: 61 U/L (ref 5–45)
BASE EXCESS BLDA CALC-SCNC: 0 MMOL/L (ref -2–3)
BASOPHILS # BLD AUTO: 0.08 THOUSANDS/ΜL (ref 0–0.1)
BASOPHILS NFR BLD AUTO: 2 % (ref 0–1)
BILIRUB SERPL-MCNC: 0.3 MG/DL (ref 0.2–1)
BUN SERPL-MCNC: 20 MG/DL (ref 5–25)
CALCIUM SERPL-MCNC: 8.8 MG/DL (ref 8.3–10.1)
CHLORIDE SERPL-SCNC: 98 MMOL/L (ref 100–108)
CO2 SERPL-SCNC: 22 MMOL/L (ref 21–32)
CREAT SERPL-MCNC: 1.51 MG/DL (ref 0.6–1.3)
EOSINOPHIL # BLD AUTO: 0.29 THOUSAND/ΜL (ref 0–0.61)
EOSINOPHIL NFR BLD AUTO: 6 % (ref 0–6)
ERYTHROCYTE [DISTWIDTH] IN BLOOD BY AUTOMATED COUNT: 16.1 % (ref 11.6–15.1)
GFR SERPL CREATININE-BSD FRML MDRD: 49 ML/MIN/1.73SQ M
GLUCOSE SERPL-MCNC: 116 MG/DL (ref 65–140)
GLUCOSE SERPL-MCNC: 117 MG/DL (ref 65–140)
HCO3 BLDA-SCNC: 24.5 MMOL/L (ref 24–30)
HCT VFR BLD AUTO: 35.9 % (ref 36.5–49.3)
HCT VFR BLD CALC: 36 % (ref 36.5–49.3)
HGB BLD-MCNC: 11.6 G/DL (ref 12–17)
HGB BLDA-MCNC: 12.2 G/DL (ref 12–17)
HOLD SPECIMEN: NORMAL
IMM GRANULOCYTES # BLD AUTO: 0.02 THOUSAND/UL (ref 0–0.2)
IMM GRANULOCYTES NFR BLD AUTO: 0 % (ref 0–2)
INR PPP: 1 (ref 0.84–1.19)
LYMPHOCYTES # BLD AUTO: 1.74 THOUSANDS/ΜL (ref 0.6–4.47)
LYMPHOCYTES NFR BLD AUTO: 35 % (ref 14–44)
MCH RBC QN AUTO: 31.1 PG (ref 26.8–34.3)
MCHC RBC AUTO-ENTMCNC: 32.3 G/DL (ref 31.4–37.4)
MCV RBC AUTO: 96 FL (ref 82–98)
MONOCYTES # BLD AUTO: 0.75 THOUSAND/ΜL (ref 0.17–1.22)
MONOCYTES NFR BLD AUTO: 15 % (ref 4–12)
NEUTROPHILS # BLD AUTO: 2.15 THOUSANDS/ΜL (ref 1.85–7.62)
NEUTS SEG NFR BLD AUTO: 42 % (ref 43–75)
NRBC BLD AUTO-RTO: 0 /100 WBCS
PCO2 BLD: 26 MMOL/L (ref 21–32)
PCO2 BLD: 39.3 MM HG (ref 42–50)
PH BLD: 7.4 [PH] (ref 7.3–7.4)
PLATELET # BLD AUTO: 152 THOUSANDS/UL (ref 149–390)
PMV BLD AUTO: 10.3 FL (ref 8.9–12.7)
PO2 BLD: 37 MM HG (ref 35–45)
POTASSIUM BLD-SCNC: 4.3 MMOL/L (ref 3.5–5.3)
POTASSIUM SERPL-SCNC: 4.1 MMOL/L (ref 3.5–5.3)
PROT SERPL-MCNC: 7.7 G/DL (ref 6.4–8.2)
PROTHROMBIN TIME: 13.2 SECONDS (ref 11.6–14.5)
RBC # BLD AUTO: 3.73 MILLION/UL (ref 3.88–5.62)
SAO2 % BLD FROM PO2: 71 % (ref 60–85)
SODIUM BLD-SCNC: 136 MMOL/L (ref 136–145)
SODIUM SERPL-SCNC: 133 MMOL/L (ref 136–145)
SPECIMEN SOURCE: ABNORMAL
WBC # BLD AUTO: 5.03 THOUSAND/UL (ref 4.31–10.16)

## 2022-02-01 PROCEDURE — 93308 TTE F-UP OR LMTD: CPT | Performed by: NURSE PRACTITIONER

## 2022-02-01 PROCEDURE — 84295 ASSAY OF SERUM SODIUM: CPT

## 2022-02-01 PROCEDURE — 80053 COMPREHEN METABOLIC PANEL: CPT | Performed by: SURGERY

## 2022-02-01 PROCEDURE — 99284 EMERGENCY DEPT VISIT MOD MDM: CPT

## 2022-02-01 PROCEDURE — 82803 BLOOD GASES ANY COMBINATION: CPT

## 2022-02-01 PROCEDURE — 72125 CT NECK SPINE W/O DYE: CPT

## 2022-02-01 PROCEDURE — 85014 HEMATOCRIT: CPT

## 2022-02-01 PROCEDURE — 71045 X-RAY EXAM CHEST 1 VIEW: CPT

## 2022-02-01 PROCEDURE — NC001 PR NO CHARGE: Performed by: EMERGENCY MEDICINE

## 2022-02-01 PROCEDURE — 85025 COMPLETE CBC W/AUTO DIFF WBC: CPT | Performed by: SURGERY

## 2022-02-01 PROCEDURE — 85610 PROTHROMBIN TIME: CPT | Performed by: SURGERY

## 2022-02-01 PROCEDURE — 96374 THER/PROPH/DIAG INJ IV PUSH: CPT

## 2022-02-01 PROCEDURE — 12015 RPR F/E/E/N/L/M 7.6-12.5 CM: CPT | Performed by: NURSE PRACTITIONER

## 2022-02-01 PROCEDURE — 36415 COLL VENOUS BLD VENIPUNCTURE: CPT | Performed by: SURGERY

## 2022-02-01 PROCEDURE — 76705 ECHO EXAM OF ABDOMEN: CPT | Performed by: NURSE PRACTITIONER

## 2022-02-01 PROCEDURE — 85730 THROMBOPLASTIN TIME PARTIAL: CPT | Performed by: SURGERY

## 2022-02-01 PROCEDURE — 82947 ASSAY GLUCOSE BLOOD QUANT: CPT

## 2022-02-01 PROCEDURE — NC001 PR NO CHARGE: Performed by: SURGERY

## 2022-02-01 PROCEDURE — 99284 EMERGENCY DEPT VISIT MOD MDM: CPT | Performed by: NURSE PRACTITIONER

## 2022-02-01 PROCEDURE — 84132 ASSAY OF SERUM POTASSIUM: CPT

## 2022-02-01 PROCEDURE — 70450 CT HEAD/BRAIN W/O DYE: CPT

## 2022-02-01 RX ORDER — LIDOCAINE HYDROCHLORIDE 10 MG/ML
10 INJECTION, SOLUTION EPIDURAL; INFILTRATION; INTRACAUDAL; PERINEURAL ONCE
Status: COMPLETED | OUTPATIENT
Start: 2022-02-01 | End: 2022-02-01

## 2022-02-01 RX ORDER — ACETAMINOPHEN 325 MG/1
650 TABLET ORAL EVERY 6 HOURS PRN
Refills: 0 | Status: ON HOLD
Start: 2022-02-01 | End: 2022-04-11

## 2022-02-01 RX ORDER — FENTANYL CITRATE 50 UG/ML
INJECTION, SOLUTION INTRAMUSCULAR; INTRAVENOUS CODE/TRAUMA/SEDATION MEDICATION
Status: COMPLETED | OUTPATIENT
Start: 2022-02-01 | End: 2022-02-01

## 2022-02-01 RX ORDER — FENTANYL CITRATE 50 UG/ML
INJECTION, SOLUTION INTRAMUSCULAR; INTRAVENOUS
Status: DISCONTINUED
Start: 2022-02-01 | End: 2022-02-01 | Stop reason: HOSPADM

## 2022-02-01 RX ADMIN — LIDOCAINE HYDROCHLORIDE 15 ML: 10 INJECTION, SOLUTION EPIDURAL; INFILTRATION; INTRACAUDAL at 16:43

## 2022-02-01 RX ADMIN — FENTANYL CITRATE 25 MCG: 50 INJECTION, SOLUTION INTRAMUSCULAR; INTRAVENOUS at 16:40

## 2022-02-01 NOTE — ED PROVIDER NOTES
Emergency Department Airway Evaluation and Management Form    History  Obtained from:  Patient, EMS  Patient has no allergy information on record  Chief Complaint   Patient presents with   Rose Marie Diones Fall     HPI     Patient arrives the pre-hospital level B trauma activation due to fall, head strike, reported anticoagulation use  Patient also has been using alcohol  Patient has large laceration to right side of his head  Denies additional areas of pain or injury  Reports taking anticoagulation but does not know which one  No past medical history on file  No past surgical history on file  No family history on file  Social History     Tobacco Use    Smoking status: Not on file    Smokeless tobacco: Not on file   Substance Use Topics    Alcohol use: Not on file    Drug use: Not on file     I have reviewed and agree with the history as documented  Review of Systems     Per trauma    Physical Exam  /73   Pulse 97   Temp 97 5 °F (36 4 °C) (Tympanic)   Resp 16   Wt 85 8 kg (189 lb 2 5 oz)   SpO2 97%     Physical Exam     Per trauma    ED Medications  Medications - No data to display    Intubation  Procedures    Notes  Airway intact, equal, bilateral breath sounds  Trauma team at bedside for further evaluation, treatment, disposition      Final Diagnosis  Final diagnoses:   None       ED Provider  Electronically Signed by     Araceli Kelly MD  02/01/22 9884

## 2022-02-01 NOTE — PROCEDURES
Laceration repair    Date/Time: 2/1/2022 5:30 PM  Performed by: Isadora Chase DO  Authorized by: Isadora Chase DO   Consent: Verbal consent obtained  Consent given by: patient  Patient understanding: patient states understanding of the procedure being performed  Patient identity confirmed: verbally with patient  Time out: Immediately prior to procedure a "time out" was called to verify the correct patient, procedure, equipment, support staff and site/side marked as required  Body area: head/neck  Location details: forehead  Laceration length: 9 cm  Foreign bodies: no foreign bodies  Tendon involvement: none  Nerve involvement: none  Vascular damage: no  Anesthesia: local infiltration    Anesthesia:  Local Anesthetic: lidocaine 1% without epinephrine    Sedation:  Patient sedated: no      Wound Dehiscence:    Secondary closure or dehiscence: complex    Procedure Details:  Irrigation solution: saline  Amount of cleaning: extensive  Debridement: none  Degree of undermining: none  Skin closure: Ethilon (4 0)  Subcutaneous closure: 4-0 Vicryl and 3-0 Vicryl  Number of sutures: 6 subcutaneous with 4-0 Vicryl and 3-0 Vicryl  18 skin closure with 4-0 Ethilon    Technique: simple  Approximation: close  Approximation difficulty: simple  Patient tolerance: patient tolerated the procedure well with no immediate complications

## 2022-02-01 NOTE — CASE MANAGEMENT
CM responded to trauma alert  [facility] transported patient to trauma bay  Patient is responsive to medical team questions and instructions  Patient reported that he would like his son Quita Nageotte notified  Cm confirmed correct number for son by completing chart review  Patient's son's number is [phone]  Cm provided information to Trauma AP  No current identified CM needs  CM will follow and update screening, assessment, and discharge planning as appropriate

## 2022-02-01 NOTE — DISCHARGE INSTRUCTIONS
Traumatic Laceration and Wound Care Instructions:     Wound Care:  - Wash laceration/wound daily, gently in the shower, do not scrub  Pat dry with clean towel  Do NOT immerse completely in water (i e  tub or swimming pool) until cleared by your primary care provider  - Follow-up with your primary care provider or the ER as directed for suture/staple removal   - Call office if you develop fever/chills, redness/swelling/drainage from the site  Additional Instructions:  - If you have any questions or concerns after discharge please call the office   - Call office or return to ER if fever greater than 101, chills, increasing redness/swelling at site of laceration/wound, purulent or foul smelling drainage from laceration/wound, and/or worsening/uncontrollable pain

## 2022-02-01 NOTE — PROCEDURES
POC FAST US    Date/Time: 2/1/2022 4:04 PM  Performed by: CHELSEA Ortega  Authorized by: Ricardo Roberts [facility]      Patient location:  Trauma  Procedure details:     Exam Type:  Diagnostic    Indications: blunt abdominal trauma and blunt chest trauma      Assess for:  Intra-abdominal fluid and pericardial effusion    Technique: FAST      Views obtained:  Heart - Pericardial sac, LUQ - Splenorenal space, Suprapubic - Pouch of Daljit and RUQ - Blankenship's Pouch    Image quality: diagnostic      Image availability:  Images available in PACS and video obtained  FAST Findings:     RUQ (Hepatorenal) free fluid: absent      LUQ (Splenorenal) free fluid: absent      Suprapubic free fluid: absent      Cardiac wall motion: identified      Pericardial effusion: absent    Interpretation:     Impressions: negative

## 2022-02-01 NOTE — H&P
H&P Exam - Trauma   Saul Zapata 63 y o  male MRN: [id number]  Unit/Bed#: [place] [address] Encounter: [id number]    Assessment/Plan   Trauma Alert: Level B  Model of Arrival: Ambulance  Trauma Team: Attending Sylwia Sawyer, Fellow Yolanda and JEREMIE Colindres  Consultants: None    Trauma Active Problems:   · Fall  · Forehead laceration  · Alcohol intoxication    Trauma Plan:   · CT imaging negative for acute traumatic injury  · Laceration closed with sutures  See procedure note  · Patient cleared for discharge once clinically sober, passes ambulatory and po trial, and family arrives  Chief Complaint:  Forehead laceration    History of Present Illness   HPI:  Saul Zapata is a 63 y o  male who presents for evaluation following a fall while getting off a bus  Patient describes striking his head and suffering a large laceration  He admits to taking an unknown blood thinner daily  He denies loss of consciousness  He denies any other pain or discomfort with the exception of the laceration on his head  He does admit to having several alcoholic drinks today  Mechanism:Fall    Review of Systems    12-point, complete review of systems was reviewed and negative except as stated above  Historical Information   History is unobtainable from the patient due to patient being a poor medical historian  Efforts to obtain history included the following sources: family member, other medical personnel, obtained from other records     Medical history:  cardiac disease, COPD, diabetes, EtOH abuse, heart artery stent, hyperlipidemia, hypertension, prostate cancer, renal disorder, CABG x1  Surgical history:  Coronary artery bypass, tonsillectomy    Social history:  Current everyday smoker:  Approximately 1 5 packs per day, drinks regularly-unable to approximate, no drug use  There is no immunization history on file for this patient    Last Tetanus: 12/2020  Family History: Non-contributory  Unable to obtain/limited by Meds/Allergies   all current active meds have been reviewed    Not on File      PHYSICAL EXAM    PE limited by: none    Objective   Vitals:   First set: Temperature: 97 5 °F (36 4 °C) (02/01/22 1545)  Pulse: 97 (02/01/22 1545)  Respirations: 16 (02/01/22 1545)  Blood Pressure: 130/73 (02/01/22 1545)    Primary Survey:   (A) Airway:  Patent  (B) Breathing:  Clear to auscultation, bilaterally  (C) Circulation: Pulses:   normal, pedal  2/4 and radial  2/4  (D) Disabliity:  GCS Total:  15  (E) Expose:  Completed    Secondary Survey: (Click on Physical Exam tab above)  Physical Exam  Constitutional:       General: He is not in acute distress  Comments: Smells of alcohol   HENT:      Head: Normocephalic  Comments: Laceration as depicted above, approximately 9 cm  Right Ear: Hearing normal       Left Ear: Hearing normal       Nose: Nose normal  No nasal deformity or nasal tenderness  Mouth/Throat:      Lips: Pink  Pharynx: Oropharynx is clear  Eyes:      Extraocular Movements: Extraocular movements intact  Pupils: Pupils are equal, round, and reactive to light  Neck:      Comments: Cervical collar applied on arrival   Cardiovascular:      Rate and Rhythm: Normal rate and regular rhythm  Pulses: Normal pulses  Heart sounds: Normal heart sounds  No murmur heard  No friction rub  No gallop  Pulmonary:      Effort: Pulmonary effort is normal  No respiratory distress  Breath sounds: No stridor  Wheezing and rhonchi present  No rales  Chest:      Chest wall: No tenderness  Abdominal:      General: Abdomen is flat  Bowel sounds are normal  There is no distension  Palpations: Abdomen is soft  There is no mass  Tenderness: There is no abdominal tenderness  There is no guarding  Hernia: No hernia is present  Genitourinary:     Comments: Pelvis stable  Musculoskeletal:         General: No swelling, tenderness or deformity  Normal range of motion  Cervical back: Normal range of motion  No tenderness  Comments: No C, T, L-spine tenderness  No step-offs  Patient moving all extremities without any focal weakness  No deformities  No joint effusions  Skin:     General: Skin is warm and dry  Capillary Refill: Capillary refill takes less than 2 seconds  Neurological:      General: No focal deficit present  Mental Status: He is alert and oriented to person, place, and time  Sensory: No sensory deficit  Motor: No weakness  Psychiatric:         Speech: Speech normal          Behavior: Behavior normal  Behavior is cooperative  Invasive Devices  Report    Peripheral Intravenous Line            Peripheral IV 02/01/22 Left Antecubital <1 day                Lab Results:   Results: I have personally reviewed pertinent reports   , BMP/CMP:   Lab Results   Component Value Date    CO2 26 02/01/2022    GLUCOSE 116 02/01/2022    and CBC:   Lab Results   Component Value Date    WBC 5 03 02/01/2022    HGB 11 6 (L) 02/01/2022    HCT 35 9 (L) 02/01/2022    MCV 96 02/01/2022     02/01/2022    MCH 31 1 02/01/2022    MCHC 32 3 02/01/2022    RDW 16 1 (H) 02/01/2022    MPV 10 3 02/01/2022    NRBC 0 02/01/2022     Imaging/EKG Studies: Results: I have personally reviewed pertinent reports      Other Studies: none    Code Status: No Order  Advance Directive and Living Will:      Power of :    POLST:

## 2022-02-02 NOTE — ED NOTES
Pt ambulating without difficulty  Requesting to be discharged  Trauma contacted and at bedside       Jo Ann Garner RN  02/01/22 1545

## 2022-02-09 ENCOUNTER — TELEPHONE (OUTPATIENT)
Dept: FAMILY MEDICINE CLINIC | Facility: CLINIC | Age: 62
End: 2022-02-09

## 2022-02-09 NOTE — TELEPHONE ENCOUNTER
Spoke to the patient and he said he had bills to pay and just didn't feel that great yesterday  He states that he has no complaints today   He is scheduled to come in tomorrow for suture removal

## 2022-02-10 ENCOUNTER — OFFICE VISIT (OUTPATIENT)
Dept: FAMILY MEDICINE CLINIC | Facility: CLINIC | Age: 62
End: 2022-02-10
Payer: MEDICARE

## 2022-02-10 VITALS
DIASTOLIC BLOOD PRESSURE: 70 MMHG | SYSTOLIC BLOOD PRESSURE: 138 MMHG | OXYGEN SATURATION: 96 % | HEART RATE: 96 BPM | TEMPERATURE: 97.8 F | WEIGHT: 189.8 LBS | BODY MASS INDEX: 24.36 KG/M2 | HEIGHT: 74 IN | RESPIRATION RATE: 18 BRPM

## 2022-02-10 DIAGNOSIS — Z23 ENCOUNTER FOR IMMUNIZATION: ICD-10-CM

## 2022-02-10 DIAGNOSIS — S01.91XD: Primary | ICD-10-CM

## 2022-02-10 PROCEDURE — G0008 ADMIN INFLUENZA VIRUS VAC: HCPCS

## 2022-02-10 PROCEDURE — 99211 OFF/OP EST MAY X REQ PHY/QHP: CPT | Performed by: FAMILY MEDICINE

## 2022-02-10 PROCEDURE — 90682 RIV4 VACC RECOMBINANT DNA IM: CPT

## 2022-02-10 NOTE — PROGRESS NOTES
Suture removal    Date/Time: 2/10/2022 11:54 AM  Performed by: Cisco Coulter DO  Authorized by: Cisco Coulter DO   Universal Protocol:  Consent: Verbal consent obtained  Consent given by: patient  Patient understanding: patient states understanding of the procedure being performed  Patient consent: the patient's understanding of the procedure matches consent given  Procedure consent: procedure consent matches procedure scheduled  Patient identity confirmed: verbally with patient        Patient location:  Clinic  Location:     Laterality:  Right    Location:  [address] location:  Forehead  Procedure details: Tools used:  Suture removal kit    Wound appearance:  No sign(s) of infection    Number of sutures removed:  18  Post-procedure details:     Post-removal:  No dressing applied    Patient tolerance of procedure: Tolerated well, no immediate complications      Discussed with patient  the benefits, contraindications and side effects of the following vaccines: Influenza   Discussed 1 components of the vaccine/s

## 2022-02-11 ENCOUNTER — TELEPHONE (OUTPATIENT)
Dept: FAMILY MEDICINE CLINIC | Facility: CLINIC | Age: 62
End: 2022-02-11

## 2022-02-11 NOTE — TELEPHONE ENCOUNTER
Spoke to patient, denies any rashes, difficulty breathing, throat swelling or any other symptoms  States that swelling is "going down"  Advised to take Benadryl  Educated regarding symptoms requiring ED  Verbalized understanding  Advised if continues to call back for appt

## 2022-02-11 NOTE — TELEPHONE ENCOUNTER
Patient received the flu shot yesterday and this morning he woke up with his upper lip swollen    Patient would like to know if this is normal    Please advise

## 2022-03-11 ENCOUNTER — APPOINTMENT (EMERGENCY)
Dept: RADIOLOGY | Facility: HOSPITAL | Age: 62
End: 2022-03-11
Payer: MEDICARE

## 2022-03-11 ENCOUNTER — HOSPITAL ENCOUNTER (EMERGENCY)
Facility: HOSPITAL | Age: 62
Discharge: HOME | End: 2022-03-12
Attending: EMERGENCY MEDICINE
Payer: MEDICARE

## 2022-03-11 DIAGNOSIS — S02.2XXA NASAL BONE FRACTURE: ICD-10-CM
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
## 2022-03-11 DIAGNOSIS — F10.929 ALCOHOL INTOXICATION (HCC): ICD-10-CM

## 2022-03-11 DIAGNOSIS — F10.10 CHRONIC ALCOHOL ABUSE: ICD-10-CM

## 2022-03-11 LAB
ALBUMIN SERPL BCP-MCNC: 3.9 G/DL (ref 3.5–5)
ALP SERPL-CCNC: 98 U/L (ref 46–116)
ALT SERPL W P-5'-P-CCNC: 112 U/L (ref 12–78)
AMPHETAMINES SERPL QL SCN: NEGATIVE
ANION GAP SERPL CALCULATED.3IONS-SCNC: 14 MMOL/L (ref 4–13)
APAP SERPL-MCNC: <2 UG/ML (ref 10–20)
APTT PPP: 29 SECONDS (ref 23–37)
AST SERPL W P-5'-P-CCNC: 140 U/L (ref 5–45)
BARBITURATES UR QL: NEGATIVE
BASOPHILS # BLD AUTO: 0.06 THOUSANDS/ΜL (ref 0–0.1)
BASOPHILS NFR BLD AUTO: 1 % (ref 0–1)
BENZODIAZ UR QL: NEGATIVE
BILIRUB SERPL-MCNC: 0.54 MG/DL (ref 0.2–1)
BILIRUB UR QL STRIP: NEGATIVE
BUN SERPL-MCNC: 26 MG/DL (ref 5–25)
CALCIUM SERPL-MCNC: 8.4 MG/DL (ref 8.3–10.1)
CHLORIDE SERPL-SCNC: 91 MMOL/L (ref 100–108)
CLARITY UR: CLEAR
CO2 SERPL-SCNC: 25 MMOL/L (ref 21–32)
COCAINE UR QL: NEGATIVE
COLOR UR: ABNORMAL
CREAT SERPL-MCNC: 1.03 MG/DL (ref 0.6–1.3)
EOSINOPHIL # BLD AUTO: 0.03 THOUSAND/ΜL (ref 0–0.61)
EOSINOPHIL NFR BLD AUTO: 1 % (ref 0–6)
ERYTHROCYTE [DISTWIDTH] IN BLOOD BY AUTOMATED COUNT: 15.9 % (ref 11.6–15.1)
ETHANOL SERPL-MCNC: 393 MG/DL (ref 0–3)
GFR SERPL CREATININE-BSD FRML MDRD: 79 ML/MIN/1.73SQ M
GLUCOSE SERPL-MCNC: 91 MG/DL (ref 65–140)
GLUCOSE UR STRIP-MCNC: NEGATIVE MG/DL
HCT VFR BLD AUTO: 37.4 % (ref 36.5–49.3)
HGB BLD-MCNC: 12.9 G/DL (ref 12–17)
HGB UR QL STRIP.AUTO: ABNORMAL
IMM GRANULOCYTES # BLD AUTO: 0.01 THOUSAND/UL (ref 0–0.2)
IMM GRANULOCYTES NFR BLD AUTO: 0 % (ref 0–2)
INR PPP: 0.96 (ref 0.84–1.19)
KETONES UR STRIP-MCNC: NEGATIVE MG/DL
LEUKOCYTE ESTERASE UR QL STRIP: NEGATIVE
LYMPHOCYTES # BLD AUTO: 1.53 THOUSANDS/ΜL (ref 0.6–4.47)
LYMPHOCYTES NFR BLD AUTO: 27 % (ref 14–44)
MAGNESIUM SERPL-MCNC: 1.4 MG/DL (ref 1.6–2.6)
MCH RBC QN AUTO: 31.9 PG (ref 26.8–34.3)
MCHC RBC AUTO-ENTMCNC: 34.5 G/DL (ref 31.4–37.4)
MCV RBC AUTO: 92 FL (ref 82–98)
METHADONE UR QL: NEGATIVE
MONOCYTES # BLD AUTO: 0.54 THOUSAND/ΜL (ref 0.17–1.22)
MONOCYTES NFR BLD AUTO: 9 % (ref 4–12)
NEUTROPHILS # BLD AUTO: 3.59 THOUSANDS/ΜL (ref 1.85–7.62)
NEUTS SEG NFR BLD AUTO: 62 % (ref 43–75)
NITRITE UR QL STRIP: NEGATIVE
NRBC BLD AUTO-RTO: 0 /100 WBCS
OPIATES UR QL SCN: NEGATIVE
OXYCODONE+OXYMORPHONE UR QL SCN: NEGATIVE
PCP UR QL: NEGATIVE
PH UR STRIP.AUTO: 6 [PH]
PLATELET # BLD AUTO: 71 THOUSANDS/UL (ref 149–390)
PMV BLD AUTO: 11.2 FL (ref 8.9–12.7)
POTASSIUM SERPL-SCNC: 5 MMOL/L (ref 3.5–5.3)
PROT SERPL-MCNC: 7.6 G/DL (ref 6.4–8.2)
PROT UR STRIP-MCNC: ABNORMAL MG/DL
PROTHROMBIN TIME: 12.6 SECONDS (ref 11.6–14.5)
RBC # BLD AUTO: 4.05 MILLION/UL (ref 3.88–5.62)
SALICYLATES SERPL-MCNC: 6.4 MG/DL (ref 3–20)
SODIUM SERPL-SCNC: 130 MMOL/L (ref 136–145)
SP GR UR STRIP.AUTO: <=1.005 (ref 1–1.03)
THC UR QL: NEGATIVE
UROBILINOGEN UR QL STRIP.AUTO: 0.2 E.U./DL
WBC # BLD AUTO: 5.76 THOUSAND/UL (ref 4.31–10.16)

## 2022-03-11 PROCEDURE — 82077 ASSAY SPEC XCP UR&BREATH IA: CPT | Performed by: EMERGENCY MEDICINE

## 2022-03-11 PROCEDURE — 80307 DRUG TEST PRSMV CHEM ANLYZR: CPT | Performed by: EMERGENCY MEDICINE

## 2022-03-11 PROCEDURE — 70450 CT HEAD/BRAIN W/O DYE: CPT

## 2022-03-11 PROCEDURE — 80179 DRUG ASSAY SALICYLATE: CPT | Performed by: EMERGENCY MEDICINE

## 2022-03-11 PROCEDURE — 72125 CT NECK SPINE W/O DYE: CPT

## 2022-03-11 PROCEDURE — 85730 THROMBOPLASTIN TIME PARTIAL: CPT | Performed by: EMERGENCY MEDICINE

## 2022-03-11 PROCEDURE — 71260 CT THORAX DX C+: CPT

## 2022-03-11 PROCEDURE — 80053 COMPREHEN METABOLIC PANEL: CPT | Performed by: EMERGENCY MEDICINE

## 2022-03-11 PROCEDURE — 70486 CT MAXILLOFACIAL W/O DYE: CPT

## 2022-03-11 PROCEDURE — 99284 EMERGENCY DEPT VISIT MOD MDM: CPT

## 2022-03-11 PROCEDURE — 85610 PROTHROMBIN TIME: CPT | Performed by: EMERGENCY MEDICINE

## 2022-03-11 PROCEDURE — 74177 CT ABD & PELVIS W/CONTRAST: CPT

## 2022-03-11 PROCEDURE — 81001 URINALYSIS AUTO W/SCOPE: CPT | Performed by: EMERGENCY MEDICINE

## 2022-03-11 PROCEDURE — 96361 HYDRATE IV INFUSION ADD-ON: CPT

## 2022-03-11 PROCEDURE — 83735 ASSAY OF MAGNESIUM: CPT | Performed by: EMERGENCY MEDICINE

## 2022-03-11 PROCEDURE — 85025 COMPLETE CBC W/AUTO DIFF WBC: CPT | Performed by: EMERGENCY MEDICINE

## 2022-03-11 PROCEDURE — 36415 COLL VENOUS BLD VENIPUNCTURE: CPT | Performed by: EMERGENCY MEDICINE

## 2022-03-11 PROCEDURE — 80143 DRUG ASSAY ACETAMINOPHEN: CPT | Performed by: EMERGENCY MEDICINE

## 2022-03-11 RX ORDER — MAGNESIUM SULFATE HEPTAHYDRATE 40 MG/ML
2 INJECTION, SOLUTION INTRAVENOUS ONCE
Status: COMPLETED | OUTPATIENT
Start: 2022-03-11 | End: 2022-03-12

## 2022-03-11 RX ADMIN — IOHEXOL 100 ML: 350 INJECTION, SOLUTION INTRAVENOUS at 23:20

## 2022-03-11 RX ADMIN — SODIUM CHLORIDE 1000 ML: 9 INJECTION, SOLUTION INTRAVENOUS at 20:16

## 2022-03-12 VITALS
WEIGHT: 190 LBS | TEMPERATURE: 97.1 F | DIASTOLIC BLOOD PRESSURE: 52 MMHG | HEART RATE: 95 BPM | RESPIRATION RATE: 20 BRPM | OXYGEN SATURATION: 94 % | BODY MASS INDEX: 25.73 KG/M2 | HEIGHT: 72 IN | SYSTOLIC BLOOD PRESSURE: 109 MMHG

## 2022-03-12 LAB
BACTERIA UR QL AUTO: NORMAL /HPF
NON-SQ EPI CELLS URNS QL MICRO: NORMAL /HPF
RBC #/AREA URNS AUTO: NORMAL /HPF
WBC #/AREA URNS AUTO: NORMAL /HPF

## 2022-03-12 PROCEDURE — 99284 EMERGENCY DEPT VISIT MOD MDM: CPT | Performed by: EMERGENCY MEDICINE

## 2022-03-12 PROCEDURE — 96365 THER/PROPH/DIAG IV INF INIT: CPT

## 2022-03-12 RX ORDER — ONDANSETRON 4 MG/1
4 TABLET, ORALLY DISINTEGRATING ORAL ONCE
Status: COMPLETED | OUTPATIENT
Start: 2022-03-12 | End: 2022-03-12

## 2022-03-12 RX ADMIN — MAGNESIUM SULFATE HEPTAHYDRATE 2 G: 40 INJECTION, SOLUTION INTRAVENOUS at 02:40

## 2022-03-12 RX ADMIN — ONDANSETRON 4 MG: 4 TABLET, ORALLY DISINTEGRATING ORAL at 06:01

## 2022-03-12 NOTE — ED NOTES
Patient asleep breathing steadily and unlabored w/o s/s of acute distress at time of assessment  Receiving magnesium well at this time  Further patient aware and receptive to current plan of care   Pending clinical sobriety and MD Otis Johnson, RN  03/12/22 9554

## 2022-03-12 NOTE — ED NOTES
Patient c-spine cleared at this time by EDMD Dr Daiana Gross   Patient provided food upon request       Danna Howell RN  03/12/22 0031

## 2022-03-12 NOTE — ED PROVIDER NOTES
History  Chief Complaint   Patient presents with    Fall     Patient brought by ems for intoxication as endorsed by patient, as well as mechanical fall  Patient states 'I just fell over something, hit my face ' Patient noted to have swollen lip and front incisor is chipped  No active bleeding noted  Answers questions appropriately, face symmetric equal  strenght  61-year-old male with past history of diabetes, hypertension, hyperlipidemia, pyelonephritis, COPD, prostate cancer, alcohol abuse, presents to the ED for evaluation after a fall  Patient states that he drank a 5th of vodka earlier today  Patient was walking into his apartment complex when he fell face forward  Patient has swelling to the nose  Patient appears to be intoxicated during interview  Patient brought to the ED via EMS for further evaluation  History provided by:  Patient and EMS personnel  Fall  Associated symptoms: no abdominal pain, no back pain, no chest pain, no headaches, no nausea and no vomiting        Prior to Admission Medications   Prescriptions Last Dose Informant Patient Reported? Taking? Blood Glucose Monitoring Suppl (ONE TOUCH ULTRA MINI) w/Device KIT  Self Yes No   Sig: Use as directed   Breo Ellipta 200-25 MCG/INH inhaler   No No   Sig: INHALE 1 PUFF BY INHALATION ROUTE EVERY 12 HOURS  RINSE OUT YOUR MOUTH AFTER EACH USE   ONETOUCH DELICA LANCETS FINE MISC  Self Yes No   Sig: 3 (three) times a day Test   Ventolin  (90 Base) MCG/ACT inhaler   No No   Sig: INHALE 2 PUFFS EVERY 4 HOURS AS NEEDED FOR WHEEZING   acetaminophen (TYLENOL) 325 mg tablet   No No   Sig: Take 2 tablets (650 mg total) by mouth every 6 (six) hours as needed for mild pain   amLODIPine (NORVASC) 5 mg tablet   No No   Sig: TAKE 1 TABLET BY MOUTH EVERY DAY   diazepam (VALIUM) 5 mg tablet   No No   Sig: Take 1 tablet (5 mg total) by mouth every 6 (six) hours as needed for anxiety (alcohol withdrawal) Do NOT mix with alcohol     folic acid (FOLVITE) 1 mg tablet   No No   Sig: TAKE 1 TABLET BY MOUTH EVERY DAY   gabapentin (NEURONTIN) 300 mg capsule   No No   Sig: TAKE 1 CAPSULE BY MOUTH 3 TIMES A DAY   lisinopril (ZESTRIL) 10 mg tablet   No No   Sig: TAKE 1 TABLET BY MOUTH EVERY DAY   magnesium Oxide (MAG-OX) 400 mg TABS   Yes No   Sig: Take 400 mg by mouth 2 (two) times a day   metFORMIN (GLUCOPHAGE) 1000 MG tablet   No No   Sig: TAKE 1 TABLET BY MOUTH EVERY 12 HOURS   metoprolol tartrate (LOPRESSOR) 25 mg tablet   No No   Sig: TAKE 1 TABLET BY MOUTH EVERY 12 HOURS   omeprazole (PriLOSEC) 20 mg delayed release capsule   No No   Sig: Take 1 capsule (20 mg total) by mouth daily   pravastatin (PRAVACHOL) 40 mg tablet   No No   Sig: TAKE 1 TABLET BY MOUTH EVERY DAY WITH DINNER   ranolazine (RANEXA) 500 mg 12 hr tablet   No No   Sig: TAKE 1 TABLET BY MOUTH EVERY 12 HOURS   tamsulosin (FLOMAX) 0 4 mg   Yes No   Sig: Take 0 4 mg by mouth daily   thiamine 100 MG tablet   No No   Sig: TAKE 1 TABLET BY MOUTH EVERY DAY      Facility-Administered Medications: None       Past Medical History:   Diagnosis Date    Cancer [facility])     prostate ca,had radiation    Cardiac disease     stents,then triple bypass    COPD (chronic obstructive pulmonary disease) (HCC)     Diabetes mellitus ([facility] [address] )     ETOH abuse     Hx of heart artery stent     2014    Hyperlipidemia     Hypertension     Pneumonia     Prostate CA ([facility] [address] )     Renal disorder     pyelonephritis    S/P CABG x 1     2004       Past Surgical History:   Procedure Laterality Date    CORONARY ARTERY BYPASS GRAFT  2004    MN ARTHRODESIS ANT INTERBODY MIN DISCECTOMY, CERVICAL BELOW C2 N/A 12/16/2020    Procedure: Anterior cervical discectomy with fusion C4-C7;  Posterior cervical decompression and fusion C2-T2;  Surgeon: Jeannie Redd MD;  [facility]: [facility];  Service: Neurosurgery    TONSILLECTOMY         Family History   Problem Relation Age of Onset    Diabetes Mother     Uterine cancer Mother    Wash [employer] COPD Father     Hypertension Father      I have reviewed and agree with the history as documented  E-Cigarette/Vaping    E-Cigarette Use Never User      E-Cigarette/Vaping Substances    Nicotine Yes     THC No     CBD No     Flavoring No     Other No     Unknown No      Social History     Tobacco Use    Smoking status: Current Every Day Smoker     Packs/day: 1 50     Years: 40 00     Pack years: 60 00     Types: Cigarettes    Smokeless tobacco: Never Used   Vaping Use    Vaping Use: Never used   Substance Use Topics    Alcohol use: Not Currently     Alcohol/week: 10 0 standard drinks     Types: 10 Shots of liquor per week     Comment: recovering alcoholic  Last drink 7/10/21    Drug use: No       Review of Systems   Constitutional: Negative for activity change, fatigue and fever  HENT: Negative for congestion, ear discharge and sore throat  Nose pain   Eyes: Negative for pain and redness  Respiratory: Negative for cough, chest tightness, shortness of breath and wheezing  Cardiovascular: Negative for chest pain  Gastrointestinal: Negative for abdominal pain, diarrhea, nausea and vomiting  Endocrine: Negative for cold intolerance  Genitourinary: Negative for dysuria and urgency  Musculoskeletal: Negative for arthralgias and back pain  Neurological: Negative for dizziness, weakness and headaches  Psychiatric/Behavioral: Negative for agitation and behavioral problems  Physical Exam  Physical Exam  Vitals and nursing note reviewed  Constitutional:       Appearance: He is well-developed  Comments: Disheveled appearance, breath smells of alcohol  HENT:      Head: Normocephalic and atraumatic  Nose: Nose normal       Comments: Redness and swelling noted to the nose  Superficial abrasion with dried blood noted to the opening of right nasal canal  Both nasal canals are otherwise unremarkable    Eyes:      Conjunctiva/sclera: Conjunctivae normal    Cardiovascular: Rate and Rhythm: Normal rate and regular rhythm  Heart sounds: Normal heart sounds  Pulmonary:      Effort: Pulmonary effort is normal       Breath sounds: Normal breath sounds  Abdominal:      General: Bowel sounds are normal  There is no distension  Palpations: Abdomen is soft  Tenderness: There is no abdominal tenderness  Musculoskeletal:         General: Normal range of motion  Cervical back: Normal range of motion and neck supple  Skin:     General: Skin is warm  Neurological:      General: No focal deficit present  Mental Status: He is alert and oriented to person, place, and time  Psychiatric:         Mood and Affect: Mood normal          Behavior: Behavior normal          Thought Content:  Thought content normal          Judgment: Judgment normal          Vital Signs  ED Triage Vitals [03/11/22 2004]   Temperature Pulse Respirations Blood Pressure SpO2   (!) 97 1 °F (36 2 °C) 82 14 106/58 94 %      Temp Source Heart Rate Source Patient Position - Orthostatic VS BP Location FiO2 (%)   Oral Monitor Lying Right arm --      Pain Score       9           Vitals:    03/11/22 2004   BP: 106/58   Pulse: 82   Patient Position - Orthostatic VS: Lying         Visual Acuity      ED Medications  Medications   magnesium sulfate 2 g/50 mL IVPB (premix) 2 g (2 g Intravenous New Bag 3/12/22 0240)   sodium chloride 0 9 % bolus 1,000 mL (0 mL Intravenous Stopped 3/11/22 2116)   iohexol (OMNIPAQUE) 350 MG/ML injection (SINGLE-DOSE) 100 mL (100 mL Intravenous Given 3/11/22 2320)       Diagnostic Studies  Results Reviewed     Procedure Component Value Units Date/Time    Urine Microscopic [[id number]]  (Normal) Collected: 03/11/22 2321    Lab Status: Final result Specimen: Urine, Clean Catch Updated: 03/12/22 0012     RBC, UA None Seen /hpf      WBC, UA None Seen /hpf      Epithelial Cells None Seen /hpf      Bacteria, UA None Seen /hpf     Rapid drug screen, urine [[id number]]  (Normal) Collected: 03/11/22 2321    Lab Status: Final result Specimen: Urine, Clean Catch Updated: 03/11/22 2355     Amph/Meth UR Negative     Barbiturate Ur Negative     Benzodiazepine Urine Negative     Cocaine Urine Negative     Methadone Urine Negative     Opiate Urine Negative     PCP Ur Negative     THC Urine Negative     Oxycodone Urine Negative    Narrative:      FOR MEDICAL PURPOSES ONLY  IF CONFIRMATION NEEDED PLEASE CONTACT THE LAB WITHIN 5 DAYS      Drug Screen Cutoff Levels:  AMPHETAMINE/METHAMPHETAMINES  1000 ng/mL  BARBITURATES     200 ng/mL  BENZODIAZEPINES     200 ng/mL  COCAINE      300 ng/mL  METHADONE      300 ng/mL  OPIATES      300 ng/mL  PHENCYCLIDINE     25 ng/mL  THC       50 ng/mL  OXYCODONE      100 ng/mL    UA w Reflex to Microscopic w Reflex to Culture [[id number]]  (Abnormal) Collected: 03/11/22 2321    Lab Status: Final result Specimen: Urine, Clean Catch Updated: 03/11/22 2338     Color, UA Light Yellow     Clarity, UA Clear     Specific Gravity, UA <=1 005     pH, UA 6 0     Leukocytes, UA Negative     Nitrite, UA Negative     Protein, UA Trace mg/dl      Glucose, UA Negative mg/dl      Ketones, UA Negative mg/dl      Urobilinogen, UA 0 2 E U /dl      Bilirubin, UA Negative     Blood, UA Small    Comprehensive metabolic panel [[id number]]  (Abnormal) Collected: 03/11/22 2015    Lab Status: Final result Specimen: Blood from Arm, Right Updated: 03/11/22 2110     Sodium 130 mmol/L      Potassium 5 0 mmol/L      Chloride 91 mmol/L      CO2 25 mmol/L      ANION GAP 14 mmol/L      BUN 26 mg/dL      Creatinine 1 03 mg/dL      Glucose 91 mg/dL      Calcium 8 4 mg/dL       U/L       U/L      Alkaline Phosphatase 98 U/L      Total Protein 7 6 g/dL      Albumin 3 9 g/dL      Total Bilirubin 0 54 mg/dL      eGFR 79 ml/min/1 73sq m     Narrative:      [facility] guidelines for Chronic Kidney Disease (CKD):     Stage 1 with normal or high GFR (GFR > 90 mL/min/1 73 square meters)    Stage 2 Mild CKD (GFR = 60-89 mL/min/1 73 square meters)    Stage 3A Moderate CKD (GFR = 45-59 mL/min/1 73 square meters)    Stage 3B Moderate CKD (GFR = 30-44 mL/min/1 73 square meters)    Stage 4 Severe CKD (GFR = 15-29 mL/min/1 73 square meters)    Stage 5 End Stage CKD (GFR <15 mL/min/1 73 square meters)  Note: GFR calculation is accurate only with a steady state creatinine    Magnesium [[id number]]  (Abnormal) Collected: 03/11/22 2015    Lab Status: Final result Specimen: Blood from Arm, Right Updated: 03/11/22 2110     Magnesium 1 4 mg/dL     Salicylate level [[id number]]  (Normal) Collected: 03/11/22 2015    Lab Status: Final result Specimen: Blood from Arm, Right Updated: 92/57/88 6367     Salicylate Lvl 6 4 mg/dL     Acetaminophen level-If concentration is detectable, please discuss with medical  on call   [[id number]]  (Abnormal) Collected: 03/11/22 2015    Lab Status: Final result Specimen: Blood from Arm, Right Updated: 03/11/22 2110     Acetaminophen Level <2 ug/mL     CBC and differential [[id number]]  (Abnormal) Collected: 03/11/22 2015    Lab Status: Final result Specimen: Blood from Arm, Right Updated: 03/11/22 2057     WBC 5 76 Thousand/uL      RBC 4 05 Million/uL      Hemoglobin 12 9 g/dL      Hematocrit 37 4 %      MCV 92 fL      MCH 31 9 pg      MCHC 34 5 g/dL      RDW 15 9 %      MPV 11 2 fL      Platelets 71 Thousands/uL      nRBC 0 /100 WBCs      Neutrophils Relative 62 %      Immat GRANS % 0 %      Lymphocytes Relative 27 %      Monocytes Relative 9 %      Eosinophils Relative 1 %      Basophils Relative 1 %      Neutrophils Absolute 3 59 Thousands/µL      Immature Grans Absolute 0 01 Thousand/uL      Lymphocytes Absolute 1 53 Thousands/µL      Monocytes Absolute 0 54 Thousand/µL      Eosinophils Absolute 0 03 Thousand/µL      Basophils Absolute 0 06 Thousands/µL     Ethanol [[id number]]  (Abnormal) Collected: 03/11/22 2015    Lab Status: Final result Specimen: Blood from Arm, Right Updated: 03/11/22 2043     Ethanol Lvl 393 mg/dL     Protime-INR [[id number]]  (Normal) Collected: 03/11/22 2015    Lab Status: Final result Specimen: Blood from Arm, Right Updated: 03/11/22 2036     Protime 12 6 seconds      INR 0 96    APTT [[id number]]  (Normal) Collected: 03/11/22 2015    Lab Status: Final result Specimen: Blood from Arm, Right Updated: 03/11/22 2036     PTT 29 seconds                  TRAUMA - CT spine cervical wo contrast   Final Result by Royce Bran MD (03/12 0000)         1  No acute cervical spine fracture or traumatic malalignment  2   Midline posterior paraspinal fluid collection along the laminectomy bed is stable to slightly decreased in size, suggesting a seroma  Workstation performed: [id number]         TRAUMA - CT chest abdomen pelvis w contrast   Final Result by Royce Bran MD (03/12 0021)      No evidence of acute trauma in the chest, abdomen or pelvis  Workstation performed: [id number]         TRAUMA - CT facial bones wo contrast   Final Result by Royce Bran MD (03/11 2349)         1  Bilateral nasal bone fractures  2   Anterior nasal spine fracture  Workstation performed: [id number]         TRAUMA - CT head wo contrast   Final Result by Royce Bran MD (03/11 2345)         1  No acute intracranial hemorrhage or mass effect  2   Bilateral nasal bone fractures                    Workstation performed: [id number]                    Procedures  Procedures         ED Course                                             MDM  Number of Diagnoses or Management Options  Alcohol intoxication ([facility] [address] ): new and requires workup  Chronic alcohol abuse: new and requires workup  Fall: new and requires workup  Nasal bone fracture: new and requires workup  Diagnosis management comments: Obtain blood work, blood alcohol level, CT head/neck/chest/abdomen/pelvis to rule out any acute traumatic abnormalities  Give IV fluids       Amount and/or Complexity of Data Reviewed  Clinical lab tests: ordered and reviewed  Tests in the radiology section of CPT®: reviewed and ordered  Tests in the medicine section of CPT®: ordered and reviewed  Review and summarize past medical records: yes  Independent visualization of images, tracings, or specimens: yes    Risk of Complications, Morbidity, and/or Mortality  General comments: Patient's blood alcohol level was 393 in the ED  Patient's CMP showed a sodium of 130 with some mild anion gap and elevated BUN  This all may be secondary to dehydration  Patient given IV fluid hydration  Patient's magnesium is low in the ED as well which was repleted  Patient's tetanus is up-to-date  CT scan showed concern for bilateral nasal bone fractures  No other acute traumatic abnormalities noted  Patient is not complaining of any shortness of breath  Care of patient signed out to the next attending who will continue to monitor the patient until he is clinically sober  Patient will then be discharged home with follow-up to PCP as well as OMFS  Patient Progress  Patient progress: stable      Disposition  Final diagnoses:   Fall   Nasal bone fracture   Alcohol intoxication ([facility] [address] )   Chronic alcohol abuse     Time reflects when diagnosis was documented in both MDM as applicable and the Disposition within this note     Time User Action Codes Description Comment    3/12/2022  2:33 AM Aliza Dwo Add Elissa Staple  XXXA] Fall     3/12/2022  2:33 AM Dominick Erwin Add [S02  2XXA] Nasal bone fracture     3/12/2022  2:33 AM Dominick Erwin Add [F10 929] Alcohol intoxication ([facility] [address] )     3/12/2022  2:33 AM Dominick Erwin Add [F10 10] Chronic alcohol abuse       ED Disposition     None      Follow-up Information     Follow up With Specialties Details Why [facility] [place]  Schedule an appointment as soon as possible for a visit in 2 days  [facility] [address] Schedule an appointment as soon as possible for a visit   [facility]  [phone]            Patient's Medications   Discharge Prescriptions    No medications on file       No discharge procedures on file      PDMP Review       Value Time User    PDMP Reviewed  Yes 7/26/2021  4:50 PM Luiza Messina MD          ED Provider  Electronically Signed by           Fermín Gomez DO  03/12/22 9964

## 2022-04-09 ENCOUNTER — APPOINTMENT (EMERGENCY)
Dept: RADIOLOGY | Facility: HOSPITAL | Age: 62
DRG: 683 | End: 2022-04-09
Payer: MEDICARE

## 2022-04-09 ENCOUNTER — HOSPITAL ENCOUNTER (INPATIENT)
Facility: HOSPITAL | Age: 62
LOS: 1 days | Discharge: HOME | DRG: 683 | End: 2022-04-11
Attending: EMERGENCY MEDICINE | Admitting: STUDENT IN AN ORGANIZED HEALTH CARE EDUCATION/TRAINING PROGRAM
Payer: MEDICARE

## 2022-04-09 DIAGNOSIS — F17.209 NICOTINE DEPENDENCE WITH NICOTINE-INDUCED DISORDER, UNSPECIFIED NICOTINE PRODUCT TYPE: ICD-10-CM

## 2022-04-09 DIAGNOSIS — E87.1 HYPONATREMIA: ICD-10-CM

## 2022-04-09 DIAGNOSIS — F10.239 ALCOHOL WITHDRAWAL (HCC): ICD-10-CM

## 2022-04-09 DIAGNOSIS — F10.10 ALCOHOL ABUSE: Chronic | ICD-10-CM

## 2022-04-09 DIAGNOSIS — R53.81 PHYSICAL DECONDITIONING: ICD-10-CM

## 2022-04-09 DIAGNOSIS — N17.9 AKI (ACUTE KIDNEY INJURY) (HCC): Primary | ICD-10-CM

## 2022-04-09 DIAGNOSIS — C61 PROSTATE CANCER (HCC): ICD-10-CM

## 2022-04-09 LAB
ALBUMIN SERPL BCP-MCNC: 3.9 G/DL (ref 3.5–5)
ALP SERPL-CCNC: 97 U/L (ref 46–116)
ALT SERPL W P-5'-P-CCNC: 53 U/L (ref 12–78)
ANION GAP SERPL CALCULATED.3IONS-SCNC: 12 MMOL/L (ref 4–13)
AST SERPL W P-5'-P-CCNC: 52 U/L (ref 5–45)
BASOPHILS # BLD AUTO: 0.05 THOUSANDS/ΜL (ref 0–0.1)
BASOPHILS NFR BLD AUTO: 1 % (ref 0–1)
BILIRUB SERPL-MCNC: 0.59 MG/DL (ref 0.2–1)
BUN SERPL-MCNC: 35 MG/DL (ref 5–25)
CALCIUM SERPL-MCNC: 9.2 MG/DL (ref 8.3–10.1)
CARDIAC TROPONIN I PNL SERPL HS: 97 NG/L
CHLORIDE SERPL-SCNC: 91 MMOL/L (ref 100–108)
CO2 SERPL-SCNC: 26 MMOL/L (ref 21–32)
CREAT SERPL-MCNC: 1.61 MG/DL (ref 0.6–1.3)
EOSINOPHIL # BLD AUTO: 0.02 THOUSAND/ΜL (ref 0–0.61)
EOSINOPHIL NFR BLD AUTO: 0 % (ref 0–6)
ERYTHROCYTE [DISTWIDTH] IN BLOOD BY AUTOMATED COUNT: 14.6 % (ref 11.6–15.1)
GFR SERPL CREATININE-BSD FRML MDRD: 46 ML/MIN/1.73SQ M
GLUCOSE SERPL-MCNC: 109 MG/DL (ref 65–140)
GLUCOSE SERPL-MCNC: 115 MG/DL (ref 65–140)
HCT VFR BLD AUTO: 39.2 % (ref 36.5–49.3)
HGB BLD-MCNC: 13.4 G/DL (ref 12–17)
IMM GRANULOCYTES # BLD AUTO: 0.03 THOUSAND/UL (ref 0–0.2)
IMM GRANULOCYTES NFR BLD AUTO: 0 % (ref 0–2)
LYMPHOCYTES # BLD AUTO: 1.47 THOUSANDS/ΜL (ref 0.6–4.47)
LYMPHOCYTES NFR BLD AUTO: 19 % (ref 14–44)
MAGNESIUM SERPL-MCNC: 1.2 MG/DL (ref 1.6–2.6)
MCH RBC QN AUTO: 32.1 PG (ref 26.8–34.3)
MCHC RBC AUTO-ENTMCNC: 34.2 G/DL (ref 31.4–37.4)
MCV RBC AUTO: 94 FL (ref 82–98)
MONOCYTES # BLD AUTO: 1.13 THOUSAND/ΜL (ref 0.17–1.22)
MONOCYTES NFR BLD AUTO: 15 % (ref 4–12)
NEUTROPHILS # BLD AUTO: 5.05 THOUSANDS/ΜL (ref 1.85–7.62)
NEUTS SEG NFR BLD AUTO: 65 % (ref 43–75)
NRBC BLD AUTO-RTO: 0 /100 WBCS
PLATELET # BLD AUTO: 80 THOUSANDS/UL (ref 149–390)
PMV BLD AUTO: 11.1 FL (ref 8.9–12.7)
POTASSIUM SERPL-SCNC: 5.4 MMOL/L (ref 3.5–5.3)
PROT SERPL-MCNC: 8.1 G/DL (ref 6.4–8.2)
RBC # BLD AUTO: 4.17 MILLION/UL (ref 3.88–5.62)
SODIUM SERPL-SCNC: 129 MMOL/L (ref 136–145)
WBC # BLD AUTO: 7.75 THOUSAND/UL (ref 4.31–10.16)

## 2022-04-09 PROCEDURE — 80053 COMPREHEN METABOLIC PANEL: CPT | Performed by: EMERGENCY MEDICINE

## 2022-04-09 PROCEDURE — 71045 X-RAY EXAM CHEST 1 VIEW: CPT

## 2022-04-09 PROCEDURE — 84484 ASSAY OF TROPONIN QUANT: CPT | Performed by: EMERGENCY MEDICINE

## 2022-04-09 PROCEDURE — 93005 ELECTROCARDIOGRAM TRACING: CPT

## 2022-04-09 PROCEDURE — 96361 HYDRATE IV INFUSION ADD-ON: CPT

## 2022-04-09 PROCEDURE — 99285 EMERGENCY DEPT VISIT HI MDM: CPT

## 2022-04-09 PROCEDURE — 83735 ASSAY OF MAGNESIUM: CPT | Performed by: EMERGENCY MEDICINE

## 2022-04-09 PROCEDURE — 96374 THER/PROPH/DIAG INJ IV PUSH: CPT

## 2022-04-09 PROCEDURE — 82948 REAGENT STRIP/BLOOD GLUCOSE: CPT

## 2022-04-09 PROCEDURE — 96376 TX/PRO/DX INJ SAME DRUG ADON: CPT

## 2022-04-09 PROCEDURE — 36415 COLL VENOUS BLD VENIPUNCTURE: CPT | Performed by: EMERGENCY MEDICINE

## 2022-04-09 PROCEDURE — 99285 EMERGENCY DEPT VISIT HI MDM: CPT | Performed by: EMERGENCY MEDICINE

## 2022-04-09 PROCEDURE — 85025 COMPLETE CBC W/AUTO DIFF WBC: CPT | Performed by: EMERGENCY MEDICINE

## 2022-04-09 PROCEDURE — 83036 HEMOGLOBIN GLYCOSYLATED A1C: CPT | Performed by: STUDENT IN AN ORGANIZED HEALTH CARE EDUCATION/TRAINING PROGRAM

## 2022-04-09 PROCEDURE — 84550 ASSAY OF BLOOD/URIC ACID: CPT | Performed by: STUDENT IN AN ORGANIZED HEALTH CARE EDUCATION/TRAINING PROGRAM

## 2022-04-09 PROCEDURE — 82077 ASSAY SPEC XCP UR&BREATH IA: CPT | Performed by: EMERGENCY MEDICINE

## 2022-04-09 RX ORDER — DIAZEPAM 5 MG/ML
5 INJECTION, SOLUTION INTRAMUSCULAR; INTRAVENOUS ONCE
Status: COMPLETED | OUTPATIENT
Start: 2022-04-09 | End: 2022-04-09

## 2022-04-09 RX ORDER — ASPIRIN 325 MG
325 TABLET ORAL ONCE
Status: COMPLETED | OUTPATIENT
Start: 2022-04-10 | End: 2022-04-10

## 2022-04-09 RX ADMIN — DIAZEPAM 5 MG: 10 INJECTION, SOLUTION INTRAMUSCULAR; INTRAVENOUS at 22:13

## 2022-04-09 RX ADMIN — SODIUM CHLORIDE 1000 ML: 0.9 INJECTION, SOLUTION INTRAVENOUS at 23:34

## 2022-04-09 RX ADMIN — DIAZEPAM 5 MG: 10 INJECTION, SOLUTION INTRAMUSCULAR; INTRAVENOUS at 23:11

## 2022-04-10 PROBLEM — R77.8 ELEVATED TROPONIN: Status: ACTIVE | Noted: 2022-04-10

## 2022-04-10 PROBLEM — N17.9 AKI (ACUTE KIDNEY INJURY) (HCC): Status: ACTIVE | Noted: 2022-04-10

## 2022-04-10 PROBLEM — R79.89 ELEVATED TROPONIN: Status: ACTIVE | Noted: 2022-04-10

## 2022-04-10 LAB
2HR DELTA HS TROPONIN: -2 NG/L
4HR DELTA HS TROPONIN: 0 NG/L
ALBUMIN SERPL BCP-MCNC: 3.2 G/DL (ref 3.5–5)
ALP SERPL-CCNC: 79 U/L (ref 46–116)
ALT SERPL W P-5'-P-CCNC: 42 U/L (ref 12–78)
ANION GAP SERPL CALCULATED.3IONS-SCNC: 10 MMOL/L (ref 4–13)
APTT PPP: 28 SECONDS (ref 23–37)
AST SERPL W P-5'-P-CCNC: 40 U/L (ref 5–45)
BACTERIA UR QL AUTO: NORMAL /HPF
BILIRUB SERPL-MCNC: 0.51 MG/DL (ref 0.2–1)
BILIRUB UR QL STRIP: NEGATIVE
BUN SERPL-MCNC: 32 MG/DL (ref 5–25)
CALCIUM ALBUM COR SERPL-MCNC: 9.5 MG/DL (ref 8.3–10.1)
CALCIUM SERPL-MCNC: 8.9 MG/DL (ref 8.3–10.1)
CARDIAC TROPONIN I PNL SERPL HS: 95 NG/L
CARDIAC TROPONIN I PNL SERPL HS: 97 NG/L
CHLORIDE SERPL-SCNC: 96 MMOL/L (ref 100–108)
CLARITY UR: CLEAR
CO2 SERPL-SCNC: 26 MMOL/L (ref 21–32)
COLOR UR: NORMAL
CREAT SERPL-MCNC: 1.59 MG/DL (ref 0.6–1.3)
ERYTHROCYTE [DISTWIDTH] IN BLOOD BY AUTOMATED COUNT: 14.5 % (ref 11.6–15.1)
EST. AVERAGE GLUCOSE BLD GHB EST-MCNC: 100 MG/DL
ETHANOL SERPL-MCNC: <3 MG/DL (ref 0–3)
GFR SERPL CREATININE-BSD FRML MDRD: 46 ML/MIN/1.73SQ M
GLUCOSE SERPL-MCNC: 113 MG/DL (ref 65–140)
GLUCOSE UR STRIP-MCNC: NEGATIVE MG/DL
HBA1C MFR BLD: 5.1 %
HCT VFR BLD AUTO: 34.7 % (ref 36.5–49.3)
HGB BLD-MCNC: 11.9 G/DL (ref 12–17)
HGB UR QL STRIP.AUTO: NEGATIVE
INR PPP: 1 (ref 0.84–1.19)
KETONES UR STRIP-MCNC: NEGATIVE MG/DL
LEUKOCYTE ESTERASE UR QL STRIP: NEGATIVE
LIPASE SERPL-CCNC: 388 U/L (ref 73–393)
MAGNESIUM SERPL-MCNC: 1.5 MG/DL (ref 1.6–2.6)
MCH RBC QN AUTO: 33 PG (ref 26.8–34.3)
MCHC RBC AUTO-ENTMCNC: 34.3 G/DL (ref 31.4–37.4)
MCV RBC AUTO: 96 FL (ref 82–98)
NITRITE UR QL STRIP: NEGATIVE
NON-SQ EPI CELLS URNS QL MICRO: NORMAL /HPF
OSMOLALITY UR/SERPL-RTO: 292 MMOL/KG (ref 282–298)
OSMOLALITY UR: 204 MMOL/KG
PH UR STRIP.AUTO: 7 [PH]
PLATELET # BLD AUTO: 61 THOUSANDS/UL (ref 149–390)
PMV BLD AUTO: 10.2 FL (ref 8.9–12.7)
POTASSIUM SERPL-SCNC: 4.3 MMOL/L (ref 3.5–5.3)
PROT SERPL-MCNC: 7 G/DL (ref 6.4–8.2)
PROT UR STRIP-MCNC: NEGATIVE MG/DL
PROTHROMBIN TIME: 13 SECONDS (ref 11.6–14.5)
RBC # BLD AUTO: 3.61 MILLION/UL (ref 3.88–5.62)
RBC #/AREA URNS AUTO: NORMAL /HPF
SODIUM 24H UR-SCNC: 45 MOL/L
SODIUM SERPL-SCNC: 132 MMOL/L (ref 136–145)
SP GR UR STRIP.AUTO: 1.01 (ref 1–1.03)
URATE SERPL-MCNC: 7.9 MG/DL (ref 4.2–8)
UROBILINOGEN UR QL STRIP.AUTO: 0.2 E.U./DL
VIT B12 SERPL-MCNC: 542 PG/ML (ref 100–900)
WBC # BLD AUTO: 4.23 THOUSAND/UL (ref 4.31–10.16)
WBC #/AREA URNS AUTO: NORMAL /HPF

## 2022-04-10 PROCEDURE — 83930 ASSAY OF BLOOD OSMOLALITY: CPT | Performed by: STUDENT IN AN ORGANIZED HEALTH CARE EDUCATION/TRAINING PROGRAM

## 2022-04-10 PROCEDURE — 85730 THROMBOPLASTIN TIME PARTIAL: CPT | Performed by: STUDENT IN AN ORGANIZED HEALTH CARE EDUCATION/TRAINING PROGRAM

## 2022-04-10 PROCEDURE — 81001 URINALYSIS AUTO W/SCOPE: CPT | Performed by: STUDENT IN AN ORGANIZED HEALTH CARE EDUCATION/TRAINING PROGRAM

## 2022-04-10 PROCEDURE — 36415 COLL VENOUS BLD VENIPUNCTURE: CPT | Performed by: EMERGENCY MEDICINE

## 2022-04-10 PROCEDURE — 85397 CLOTTING FUNCT ACTIVITY: CPT | Performed by: STUDENT IN AN ORGANIZED HEALTH CARE EDUCATION/TRAINING PROGRAM

## 2022-04-10 PROCEDURE — 83935 ASSAY OF URINE OSMOLALITY: CPT | Performed by: STUDENT IN AN ORGANIZED HEALTH CARE EDUCATION/TRAINING PROGRAM

## 2022-04-10 PROCEDURE — 84484 ASSAY OF TROPONIN QUANT: CPT | Performed by: EMERGENCY MEDICINE

## 2022-04-10 PROCEDURE — 83735 ASSAY OF MAGNESIUM: CPT | Performed by: STUDENT IN AN ORGANIZED HEALTH CARE EDUCATION/TRAINING PROGRAM

## 2022-04-10 PROCEDURE — 85610 PROTHROMBIN TIME: CPT | Performed by: STUDENT IN AN ORGANIZED HEALTH CARE EDUCATION/TRAINING PROGRAM

## 2022-04-10 PROCEDURE — 80053 COMPREHEN METABOLIC PANEL: CPT | Performed by: STUDENT IN AN ORGANIZED HEALTH CARE EDUCATION/TRAINING PROGRAM

## 2022-04-10 PROCEDURE — 82607 VITAMIN B-12: CPT | Performed by: STUDENT IN AN ORGANIZED HEALTH CARE EDUCATION/TRAINING PROGRAM

## 2022-04-10 PROCEDURE — 84300 ASSAY OF URINE SODIUM: CPT | Performed by: STUDENT IN AN ORGANIZED HEALTH CARE EDUCATION/TRAINING PROGRAM

## 2022-04-10 PROCEDURE — 94640 AIRWAY INHALATION TREATMENT: CPT

## 2022-04-10 PROCEDURE — 83690 ASSAY OF LIPASE: CPT | Performed by: STUDENT IN AN ORGANIZED HEALTH CARE EDUCATION/TRAINING PROGRAM

## 2022-04-10 PROCEDURE — 85027 COMPLETE CBC AUTOMATED: CPT | Performed by: STUDENT IN AN ORGANIZED HEALTH CARE EDUCATION/TRAINING PROGRAM

## 2022-04-10 PROCEDURE — 94760 N-INVAS EAR/PLS OXIMETRY 1: CPT

## 2022-04-10 PROCEDURE — 99222 1ST HOSP IP/OBS MODERATE 55: CPT | Performed by: STUDENT IN AN ORGANIZED HEALTH CARE EDUCATION/TRAINING PROGRAM

## 2022-04-10 RX ORDER — CHLORDIAZEPOXIDE HYDROCHLORIDE 25 MG/1
25 CAPSULE, GELATIN COATED ORAL EVERY 6 HOURS SCHEDULED
Status: DISCONTINUED | OUTPATIENT
Start: 2022-04-13 | End: 2022-04-11 | Stop reason: HOSPADM

## 2022-04-10 RX ORDER — PRAVASTATIN SODIUM 40 MG
40 TABLET ORAL
Status: DISCONTINUED | OUTPATIENT
Start: 2022-04-10 | End: 2022-04-11 | Stop reason: HOSPADM

## 2022-04-10 RX ORDER — CHLORDIAZEPOXIDE HYDROCHLORIDE 25 MG/1
50 CAPSULE, GELATIN COATED ORAL EVERY 6 HOURS SCHEDULED
Status: DISCONTINUED | OUTPATIENT
Start: 2022-04-11 | End: 2022-04-11 | Stop reason: HOSPADM

## 2022-04-10 RX ORDER — FOLIC ACID 1 MG/1
1000 TABLET ORAL DAILY
Status: DISCONTINUED | OUTPATIENT
Start: 2022-04-10 | End: 2022-04-11 | Stop reason: HOSPADM

## 2022-04-10 RX ORDER — RANOLAZINE 500 MG/1
500 TABLET, EXTENDED RELEASE ORAL EVERY 12 HOURS SCHEDULED
Status: DISCONTINUED | OUTPATIENT
Start: 2022-04-10 | End: 2022-04-11 | Stop reason: HOSPADM

## 2022-04-10 RX ORDER — PANTOPRAZOLE SODIUM 40 MG/1
40 TABLET, DELAYED RELEASE ORAL
Status: DISCONTINUED | OUTPATIENT
Start: 2022-04-10 | End: 2022-04-11 | Stop reason: HOSPADM

## 2022-04-10 RX ORDER — TAMSULOSIN HYDROCHLORIDE 0.4 MG/1
0.4 CAPSULE ORAL DAILY
Status: DISCONTINUED | OUTPATIENT
Start: 2022-04-10 | End: 2022-04-11 | Stop reason: HOSPADM

## 2022-04-10 RX ORDER — NICOTINE 21 MG/24HR
1 PATCH, TRANSDERMAL 24 HOURS TRANSDERMAL DAILY
Status: DISCONTINUED | OUTPATIENT
Start: 2022-04-10 | End: 2022-04-11 | Stop reason: HOSPADM

## 2022-04-10 RX ORDER — LANOLIN ALCOHOL/MO/W.PET/CERES
100 CREAM (GRAM) TOPICAL DAILY
Status: DISCONTINUED | OUTPATIENT
Start: 2022-04-10 | End: 2022-04-11 | Stop reason: HOSPADM

## 2022-04-10 RX ORDER — IPRATROPIUM BROMIDE AND ALBUTEROL SULFATE 2.5; .5 MG/3ML; MG/3ML
3 SOLUTION RESPIRATORY (INHALATION)
Status: DISCONTINUED | OUTPATIENT
Start: 2022-04-10 | End: 2022-04-10

## 2022-04-10 RX ORDER — IPRATROPIUM BROMIDE AND ALBUTEROL SULFATE 2.5; .5 MG/3ML; MG/3ML
3 SOLUTION RESPIRATORY (INHALATION)
Status: DISCONTINUED | OUTPATIENT
Start: 2022-04-10 | End: 2022-04-11 | Stop reason: HOSPADM

## 2022-04-10 RX ORDER — CHLORDIAZEPOXIDE HYDROCHLORIDE 25 MG/1
25 CAPSULE, GELATIN COATED ORAL
Status: DISCONTINUED | OUTPATIENT
Start: 2022-04-12 | End: 2022-04-11 | Stop reason: HOSPADM

## 2022-04-10 RX ORDER — MAGNESIUM SULFATE HEPTAHYDRATE 40 MG/ML
4 INJECTION, SOLUTION INTRAVENOUS ONCE
Status: COMPLETED | OUTPATIENT
Start: 2022-04-10 | End: 2022-04-11

## 2022-04-10 RX ORDER — AMLODIPINE BESYLATE 5 MG/1
5 TABLET ORAL DAILY
Status: DISCONTINUED | OUTPATIENT
Start: 2022-04-10 | End: 2022-04-11 | Stop reason: HOSPADM

## 2022-04-10 RX ORDER — SODIUM CHLORIDE 9 MG/ML
75 INJECTION, SOLUTION INTRAVENOUS CONTINUOUS
Status: DISCONTINUED | OUTPATIENT
Start: 2022-04-10 | End: 2022-04-11 | Stop reason: HOSPADM

## 2022-04-10 RX ORDER — CHLORDIAZEPOXIDE HYDROCHLORIDE 25 MG/1
50 CAPSULE, GELATIN COATED ORAL
Status: COMPLETED | OUTPATIENT
Start: 2022-04-10 | End: 2022-04-10

## 2022-04-10 RX ORDER — ASPIRIN 81 MG/1
81 TABLET ORAL DAILY
Status: DISCONTINUED | OUTPATIENT
Start: 2022-04-10 | End: 2022-04-11 | Stop reason: HOSPADM

## 2022-04-10 RX ADMIN — CHLORDIAZEPOXIDE HYDROCHLORIDE 50 MG: 25 CAPSULE ORAL at 20:17

## 2022-04-10 RX ADMIN — TAMSULOSIN HYDROCHLORIDE 0.4 MG: 0.4 CAPSULE ORAL at 08:26

## 2022-04-10 RX ADMIN — CHLORDIAZEPOXIDE HYDROCHLORIDE 50 MG: 25 CAPSULE ORAL at 07:31

## 2022-04-10 RX ADMIN — MAGNESIUM SULFATE HEPTAHYDRATE 4 G: 40 INJECTION, SOLUTION INTRAVENOUS at 08:23

## 2022-04-10 RX ADMIN — METOPROLOL TARTRATE 25 MG: 25 TABLET, FILM COATED ORAL at 08:27

## 2022-04-10 RX ADMIN — CHLORDIAZEPOXIDE HYDROCHLORIDE 50 MG: 25 CAPSULE ORAL at 23:55

## 2022-04-10 RX ADMIN — ENOXAPARIN SODIUM 40 MG: 40 INJECTION SUBCUTANEOUS at 08:28

## 2022-04-10 RX ADMIN — RANOLAZINE 500 MG: 500 TABLET, EXTENDED RELEASE ORAL at 20:17

## 2022-04-10 RX ADMIN — CHLORDIAZEPOXIDE HYDROCHLORIDE 50 MG: 25 CAPSULE ORAL at 01:34

## 2022-04-10 RX ADMIN — RANOLAZINE 500 MG: 500 TABLET, EXTENDED RELEASE ORAL at 08:26

## 2022-04-10 RX ADMIN — CYANOCOBALAMIN TAB 500 MCG 1000 MCG: 500 TAB at 08:27

## 2022-04-10 RX ADMIN — PRAVASTATIN SODIUM 40 MG: 40 TABLET ORAL at 16:15

## 2022-04-10 RX ADMIN — AMLODIPINE BESYLATE 5 MG: 5 TABLET ORAL at 08:27

## 2022-04-10 RX ADMIN — THIAMINE HCL TAB 100 MG 100 MG: 100 TAB at 08:26

## 2022-04-10 RX ADMIN — ASPIRIN 81 MG: 81 TABLET, COATED ORAL at 08:27

## 2022-04-10 RX ADMIN — CHLORDIAZEPOXIDE HYDROCHLORIDE 50 MG: 25 CAPSULE ORAL at 16:15

## 2022-04-10 RX ADMIN — CHLORDIAZEPOXIDE HYDROCHLORIDE 50 MG: 25 CAPSULE ORAL at 05:00

## 2022-04-10 RX ADMIN — IPRATROPIUM BROMIDE AND ALBUTEROL SULFATE 3 ML: 2.5; .5 SOLUTION RESPIRATORY (INHALATION) at 20:42

## 2022-04-10 RX ADMIN — METOPROLOL TARTRATE 25 MG: 25 TABLET, FILM COATED ORAL at 20:17

## 2022-04-10 RX ADMIN — NICOTINE 1 PATCH: 21 PATCH, EXTENDED RELEASE TRANSDERMAL at 08:27

## 2022-04-10 RX ADMIN — PANTOPRAZOLE SODIUM 40 MG: 40 TABLET, DELAYED RELEASE ORAL at 05:07

## 2022-04-10 RX ADMIN — FOLIC ACID 1000 MCG: 1 TABLET ORAL at 08:27

## 2022-04-10 RX ADMIN — IPRATROPIUM BROMIDE AND ALBUTEROL SULFATE 3 ML: 2.5; .5 SOLUTION RESPIRATORY (INHALATION) at 13:50

## 2022-04-10 RX ADMIN — CHLORDIAZEPOXIDE HYDROCHLORIDE 50 MG: 25 CAPSULE ORAL at 12:02

## 2022-04-10 RX ADMIN — ASPIRIN 325 MG: 325 TABLET ORAL at 00:06

## 2022-04-10 RX ADMIN — SODIUM CHLORIDE 75 ML/HR: 0.9 INJECTION, SOLUTION INTRAVENOUS at 07:28

## 2022-04-10 NOTE — ASSESSMENT & PLAN NOTE
Chronic, stable    Hx of CAD s/p CABG 2004, PCI to [facility] 2014  Not on Plavix/Eliquis given history of alcohol abuse and falls, high risk of bleeding  On admission troponin was elevated however delta was decreasing      · Continue monitor  · Cardio consult

## 2022-04-10 NOTE — DISCHARGE SUMMARY
[facility] Discharge Summary - Medical Ramona Cisneros 63 y o  male MRN: [id number]    Norma 47  Room / Bed: 908 [address] [place] [address] Encounter: [id number]    BRIEF OVERVIEW    Admitting Provider: Linnette Melgar MD  Discharge Provider: Monserrat Toro MD    Discharge To:  home  Facility: n/a    Outpatient Follow-Up: [facility]  Things to address at first follow up visit:  1  Address alcohol abuse  2  Librium Patient left upset because we were not able to provide a paper script for Librium (for possible rejection from the pharmacy per Dr Charles Gaston)  Patient left the hospital before the resident had the chance to discuss that it has to be sent via e-script  Tried reaching out to him via phone but was unavailable and VM was left with direction to pick it up at the pharmacy  Medications were sent to [facility] pharmacy  3  Patient requested referral to oncology at D/C for his prostate CA; patient does not want to get TURP  4  Needs colonoscopy given anemia and screening age  11  Provided a referral for PT for physical deconditioning    Labs and results pending at discharge:   1  ACTH (hyponatremia work up)  2   JOSHI 13 (thrombocytopenia workup)     Admission Date: 4/9/2022     Discharge Date: 4/11/2022    Primary Discharge Diagnosis  Principal Problem (Resolved):    Alcohol withdrawal ([facility] )  Active Problems:    Chronic heart failure with preserved ejection fraction (HCC)    Hyponatremia    History of Atrial fibrillation (HCC)    COPD (chronic obstructive pulmonary disease) (HCC)    CAD (coronary artery disease)    Type 2 diabetes mellitus (HCC)    History of prostate cancer    Essential hypertension    Thrombocytopenia (HCC)    Hx of CABG  Resolved Problems:    ENMA (acute kidney injury) ([facility] )    Elevated troponin    Consulting Providers:  None      DETAILS OF HOSPITAL STAY    Procedures Performed/Pertinent Test results  Labs  4/11: Hgb 11, Plt 54, Na 132, K 4 5, BUN 34, Cr 1 34  4/10:  Normal lipase 388, HGB 11 9, PLT 61, Na 132, K 4 3, BUN 32, CR 1 59, low urine osmolality 204, negative UA  4/9: Na 129, K 5 4, Cr 1 61, Tnl 97 delta 4hrs 97, ETOH <3    Imaging  - CXR no acute cardiopulmonary disease      HPI: copied from H&P  72-year-old male with past medical history of congestive heart failure, COPD, diabetes, hyperlipidemia, hypertension, prostate cancer, status post CABG times 1, and extensive EtOH abuse presented the ED after saying he was feeling shaky all day  Patient reported he drinks alcohol frequently approximately 1 bottle of vodka a day and his last alcoholic beverage was the previous day  Patient in the past has went to withdrawal as far as DTs from prior cessation of alcohol  Patient also reported some shortness of breath, leg swelling difficulty urinating tremor stand weakness  Patient denied any chest nausea, vomiting, fever, chills      In the ED patient was Valium x2 as well as ASA and bolus of NS  Hospital Course  Alcohol withdrawal  Patient is a chronic alcoholic that drinks a bottle of vodka every day patient's last drink before admission was about 2 days prior  In the ED patient was given 5 mg Valium x2, which decreased patient's presentation  Patient was placed on the Librium taper, CIWA, and thiamine/folate replacement  CM was consulted and patient was discharged with 30 day supply of Librium and to closely follow-up with PCP  ENMA  Upon initial presentation, patient's creatinine was elevated to 1 6 from a baseline about 1 0  Gentle hydration provided due to H/O CHF  Held nephrotoxic medications  ENMA resolved prior to discharge    Hyponatremia  Hyponatremia could be due to the use of alcohol  He was given gentle hydration during the stay  Continue outpatient workup for hyponatremia  Consider evaluation for glucocorticoid deficiency vs SIADH vs hypothyroidism with ACTH stimulation test, a m   Cortisol, and TSH    H/O prostate cancer  Patient diagnosed with prostate cancer December 2020  Prescribed flomax but reports not taking it at home  He was continued on Flomax, and discharge with recommendations for oncology follow-up  Thrombocytopenia  Likely 2/2 alcohol abuse  Pending ADAMS13  Elevated troponin  Initial troponin was elevated, but no ST changes on ECG  Delta value is <5  No CP or SOB  Possibly due to non MI troponin elevation    Physical Exam at Discharge  Physical Exam  Vitals and nursing note reviewed  Constitutional:       Appearance: He is well-developed  HENT:      Head: Normocephalic and atraumatic  Eyes:      Conjunctiva/sclera: Conjunctivae normal    Cardiovascular:      Rate and Rhythm: Normal rate and regular rhythm  Pulses: Normal pulses  Heart sounds: Normal heart sounds  No murmur heard  Pulmonary:      Effort: Pulmonary effort is normal  No respiratory distress  Breath sounds: Normal breath sounds  Abdominal:      General: Bowel sounds are normal       Palpations: Abdomen is soft  Tenderness: There is no abdominal tenderness  Musculoskeletal:      Cervical back: Neck supple  Right lower leg: No edema  Left lower leg: No edema  Skin:     General: Skin is warm and dry  Neurological:      Mental Status: He is alert  Medications   Discharge Medication List as of 4/11/2022 11:35 AM          Discharge Medication List as of 4/11/2022 11:35 AM      CONTINUE these medications which have NOT CHANGED    Details   amLODIPine (NORVASC) 5 mg tablet TAKE 1 TABLET BY MOUTH EVERY DAY, Normal      Blood Glucose Monitoring Suppl (ONE TOUCH ULTRA MINI) w/Device KIT Use as directed, Starting u 5/16/2019, Historical Med      Breo Ellipta 200-25 MCG/INH inhaler INHALE 1 PUFF BY INHALATION ROUTE EVERY 12 HOURS   RINSE OUT YOUR MOUTH AFTER EACH USE, Normal      folic acid (FOLVITE) 1 mg tablet TAKE 1 TABLET BY MOUTH EVERY DAY, Normal      gabapentin (NEURONTIN) 300 mg capsule TAKE 1 CAPSULE BY MOUTH 3 TIMES A DAY, Normal      lisinopril (ZESTRIL) 10 mg tablet TAKE 1 TABLET BY MOUTH EVERY DAY, Normal      magnesium Oxide (MAG-OX) 400 mg TABS Take 400 mg by mouth 2 (two) times a day, Starting Fri 7/16/2021, Historical Med      metFORMIN (GLUCOPHAGE) 1000 MG tablet TAKE 1 TABLET BY MOUTH EVERY 12 HOURS, Normal      metoprolol tartrate (LOPRESSOR) 25 mg tablet TAKE 1 TABLET BY MOUTH EVERY 12 HOURS, Normal      omeprazole (PriLOSEC) 20 mg delayed release capsule Take 1 capsule (20 mg total) by mouth daily, Starting Fri 6/25/2021, Normal      ONETOUCH DELICA LANCETS FINE MISC 3 (three) times a day Test, Starting Thu 5/16/2019, Historical Med      pravastatin (PRAVACHOL) 40 mg tablet TAKE 1 TABLET BY MOUTH EVERY DAY WITH DINNER, Normal      ranolazine (RANEXA) 500 mg 12 hr tablet TAKE 1 TABLET BY MOUTH EVERY 12 HOURS, Normal      tamsulosin (FLOMAX) 0 4 mg Take 0 4 mg by mouth daily, Starting Fri 9/11/2020, Historical Med      thiamine 100 MG tablet TAKE 1 TABLET BY MOUTH EVERY DAY, Normal      Ventolin  (90 Base) MCG/ACT inhaler INHALE 2 PUFFS EVERY 4 HOURS AS NEEDED FOR WHEEZING, Normal            Discharge Medication List as of 4/11/2022 11:35 AM      START taking these medications    Details   nicotine (NICODERM CQ) 21 mg/24 hr TD 24 hr patch Place 1 patch on the skin daily, Starting Tue 4/12/2022, Normal            Discharge Medication List as of 4/11/2022 11:35 AM      STOP taking these medications       diazepam (VALIUM) 5 mg tablet Comments:   Reason for Stopping:              Allergies  No Known Allergies    Diet restrictions: none  Activity restrictions: as tolerated  Code Status: Prior  Advance Directive and Living Will: <no information>  Power of :    POLST:      Discharge Condition: stable    Discharge  Statement   I spent 45 minutes discharging the patient  This time was spent on the day of discharge  I had direct contact with the patient on the day of discharge   Additional documentation is required if more than 30 minutes were spent on discharge

## 2022-04-10 NOTE — ASSESSMENT & PLAN NOTE
Acute on chronic, intermittent    Patient reports drinking one litter bottle of vodka daily, last drank on Friday  Has history of alcohol withdrawal  In ED patient tremulus, given Valium 5mg x2    · Librium taper  · Fall precautions  · Seizure precautions  · CIWA  · Thiamine and folate replacement

## 2022-04-10 NOTE — PLAN OF CARE
Problem: Potential for Falls  Goal: Patient will remain free of falls  Description: INTERVENTIONS:  - Educate patient/family on patient safety including physical limitations  - Instruct patient to call for assistance with activity   - Consult OT/PT to assist with strengthening/mobility   - Keep Call bell within reach  - Keep bed low and locked with side rails adjusted as appropriate  - Keep care items and personal belongings within reach  - Initiate and maintain comfort rounds  - Make Fall Risk Sign visible to staff  - Offer Toileting every 2 Hours, in advance of need  - Initiate/Maintain bed alarm  - Obtain necessary fall risk management equipment: bed alarm, yellow socks  - Apply yellow socks and bracelet for high fall risk patients  - Consider moving patient to room near nurses station  Outcome: Progressing     Problem: CARDIOVASCULAR - ADULT  Goal: Maintains optimal cardiac output and hemodynamic stability  Description: INTERVENTIONS:  - Monitor I/O, vital signs and rhythm  - Monitor for S/S and trends of decreased cardiac output  - Administer and titrate ordered vasoactive medications to optimize hemodynamic stability  - Assess quality of pulses, skin color and temperature  - Assess for signs of decreased coronary artery perfusion  - Instruct patient to report change in severity of symptoms  Outcome: Progressing  Goal: Absence of cardiac dysrhythmias or at baseline rhythm  Description: INTERVENTIONS:  - Continuous cardiac monitoring, vital signs, obtain 12 lead EKG if ordered  - Administer antiarrhythmic and heart rate control medications as ordered  - Monitor electrolytes and administer replacement therapy as ordered  Outcome: Progressing

## 2022-04-10 NOTE — ASSESSMENT & PLAN NOTE
Acute, reoccurring    Patient presents with Cr of 1 61, was 1 03 one month ago  On admission patient appears dry    · I/Os  · Daily weights  · Urinary retention protocol  Continue with sodium chloride 0 9% 75 mL/hr

## 2022-04-10 NOTE — H&P
H&P Exam - Bettie Jones 63 y o  male MRN: [id number]    Unit/Bed#: 2 [place] [address] Encounter: [id number]    Assessment:  53-year-old male with an extensive past medical history presented to the ED for tremors x1 day  Patient's states he is a heavy drinker and has recently stopped  Patient's case was discussed with Dr LeighA nn Rocha  Patient's expected length of stay over 2 midnights      Plan:  * [facility]  Assessment & Plan  Acute on chronic, intermittent    Patient reports drinking one litter bottle of vodka daily, last drank on Friday  Has history of alcohol withdrawal  In ED patient tremulus, given Valium 5mg x2    · Librium taper  · Fall precautions  · Seizure precautions  · CIWA  · Thiamine and folate replacement    ENMA (acute kidney injury) ([facility] )  Assessment & Plan  Acute, reoccurring    Patient presents with Cr of 1 61, was 1 03 one month ago  On admission patient appears dry    · I/Os  · Daily weights  · Urinary retention protocol    History of Atrial fibrillation (HCC)  Assessment & Plan  Chronic, stable    History of paroxysmal afib, not on anticoagulation  On admission patient in sinus rhythm    · Continue home medications, see CHF    Chronic heart failure with preserved ejection fraction (HCC)  Assessment & Plan  Wt Readings from Last 3 Encounters:   04/09/22 90 kg (198 lb 6 6 oz)   03/11/22 86 2 kg (190 lb)   02/10/22 86 1 kg (189 lb 12 8 oz)     Chronic, unchanging    · Last echo (12/2020): EF 55-60%, G1DD  · At home patient takes Norvasc 5mg QD, lopressor 25mg BID, lisinopril 10mg QD, Ranexa 500mg BID  · Continue Norvasc 5mg QD, Lopressor 25 mg b i d , Ranexa 500mg BID  · Hold lisinopril due to ENMA  · Daily weights  · Monitor I&O         Hyponatremia  Assessment & Plan  Acute, new finding    On admission Na 129  In ED given 1L NS bolus    · Repeat BMP  · UA, Urine osmol, serum osmol, Urine Na    Essential hypertension  Assessment & Plan  Chronic, stable    · At home patient takes amlodipine 5mg QD, lisinopril 10mg QD    Type 2 diabetes mellitus [facility])  Assessment & Plan  Lab Results   Component Value Date    HGBA1C 5 3 11/19/2021     Chronic, stable    At home patient takes metformin 1000 mg b i d  Last A1c 5 3 11/19/21    · Repeat A1c  · Hold metformin in setting of ENMA  · SSI    History of prostate cancer  Assessment & Plan  Chronic, not well followed up    Patient diagnosed with prostate cancer December 2020  Prescribed flomax but reports not taking it at home    · Continue home flomax  · Monitor for urinary retention    Hx of CABG  Assessment & Plan  Prior surgery, stable    In ED initial troponin elevated to 97, No ST changes on EKG  Patient denies any chest pain  · Tele  · Trend troponins  · Am EKG      COPD (chronic obstructive pulmonary disease) (HCC)  Assessment & Plan  Chronic, stable    Patient prescribed Breo Ellipta 1 puff q 12, albuterol p r n , however patient reports that he does not use inhalers at home  On admission no wheezing present, no increased WOB    · Duo nebs PRN      Thrombocytopenia (HCC)  Assessment & Plan  Chronic, intermittent    On admission Plt 80  Likely secondary to alcohol abuse, although no prior workup    · JOSHI-13, PT, PTT  · Hold anticoagulation if Plt < 50    Elevated troponin  Assessment & Plan  Acute, new finding    In ED initial troponin elevated to 97, no ST changes on EKG  Patient denies any CP or SOB  Consider NSTEMI vs type 2 MI    · Trend troponin  · AM EKG  · Telemetry    CAD (coronary artery disease)  Assessment & Plan  Chronic, stable    Hx of CAD s/p CABG 2004, PCI to [facility] 2014  Not on Plavix/Eliquis given history of alcohol abuse and falls, high risk of bleeding  On admission troponin was elevated however delta was decreasing      · Continue monitor  · Cardio consult        History of Present Illness   65-year-old male with past medical history of congestive heart failure, COPD, diabetes, hyperlipidemia, hypertension, prostate cancer, status post CABG times 1, and extensive EtOH abuse presented the ED after saying he was feeling shaky all day  Patient reported he drinks alcohol frequently approximately 1 bottle of vodka a day and his last alcoholic beverage was the previous day  Patient in the past has went to withdrawal as far as DTs from prior cessation of alcohol  Patient also reported some shortness of breath, leg swelling difficulty urinating tremor stand weakness  Patient denied any chest nausea, vomiting, fever, chills  In the ED patient was Valium x2 as well as ASA and bolus of NS  Review of Systems   Constitutional: Negative for diaphoresis, fatigue and fever  HENT: Negative for trouble swallowing  Eyes: Negative for visual disturbance  Respiratory: Positive for shortness of breath  Negative for wheezing  Cardiovascular: Positive for leg swelling  Negative for chest pain and palpitations  Gastrointestinal: Positive for abdominal pain  Negative for blood in stool, constipation, diarrhea, nausea and vomiting  Genitourinary: Positive for difficulty urinating  Negative for hematuria and penile discharge  Skin: Negative for color change, pallor and rash  Neurological: Positive for tremors and weakness  Negative for dizziness, seizures, syncope, light-headedness and headaches  Psychiatric/Behavioral: Negative for confusion and dysphoric mood  The patient is not nervous/anxious          Historical Information   Past Medical History:   Diagnosis Date    Cancer [facility])     prostate ca,had radiation    Cardiac disease     stents,then triple bypass    [facility]ch[facility] obstructive [facility] disease) [facility] )     [facility]     ETOH abuse     Hx of heart artery stent     2014    Hyperlipidemia     Hypertension     Pneumonia     [facility]     Renal disorder     pyelonephritis    S/P CABG x 1     2004     Past Surgical History:   Procedure Laterality Date    CORONARY ARTERY BYPASS GRAFT  2004    MA ARTHRODESIS ANT INTERBODY MIN DISCECTOMY, CERVICAL BELOW C2 N/A 12/16/2020    Procedure: Anterior cervical discectomy with fusion C4-C7;  Posterior cervical decompression and fusion C2-T2;  Surgeon: Shirley Kumar MD;  Location: [facility];  Service: Neurosurgery    TONSILLECTOMY       Social History   Social History     Substance and Sexual Activity   Alcohol Use Yes    Alcohol/week: 10 0 standard drinks    Types: 10 Shots of liquor per week    Comment: last drink friday     Social History     Substance and Sexual Activity   Drug Use No     Social History     Tobacco Use   Smoking Status Current Every Day Smoker    Packs/day: 1 50    Years: 40 00    Pack years: 60 00    Types: Cigarettes   Smokeless Tobacco Never Used     E-Cigarette Use: Never User     E-Cigarette/Vaping Substances    Nicotine Yes     THC No     CBD No     Flavoring No     Other No     Unknown No        Family History:   Family History   Problem Relation Age of Onset    Diabetes Mother     Uterine cancer Mother     COPD Father     Hypertension Father        Meds/Allergies   all medications and allergies reviewed  No Known Allergies    Objective   First Vitals:   Blood Pressure: 168/85 (04/09/22 2153)  Pulse: (!) 108 (04/09/22 2153)  Temperature: 97 5 °F (36 4 °C) (04/09/22 2153)  Temp Source: Temporal (04/09/22 2153)  Respirations: 20 (04/09/22 2153)  Height: 6' (182 9 cm) (04/09/22 2153)  Weight - Scale: 90 kg (198 lb 6 6 oz) (04/09/22 2153)  SpO2: 95 % (04/09/22 2153)    Current Vitals:   Blood Pressure: 141/80 (04/10/22 0249)  Pulse: 73 (04/10/22 0249)  Temperature: 98 1 °F (36 7 °C) (04/10/22 0249)  Temp Source: Temporal (04/09/22 2153)  Respirations: 20 (04/10/22 0249)  Height: 6' (182 9 cm) (04/09/22 2153)  Weight - Scale: 90 kg (198 lb 6 6 oz) (04/09/22 2153)  SpO2: 94 % (04/10/22 0249)      Intake/Output Summary (Last 24 hours) at 4/10/2022 0429  Last data filed at 4/10/2022 0200  Gross per 24 hour   Intake 1000 ml Output --   Net 1000 ml       Invasive Devices  Report    Peripheral Intravenous Line            Peripheral IV 03/11/22 Distal;Right;Upper;Ventral (anterior) Arm 29 days    Peripheral IV 04/09/22 Left Wrist <1 day                Physical Exam  HENT:      Nose: Nose normal       Mouth/Throat:      Mouth: Mucous membranes are moist    Eyes:      Conjunctiva/sclera: Conjunctivae normal    Cardiovascular:      Rate and Rhythm: Normal rate and regular rhythm  Pulses: Normal pulses  Heart sounds: Normal heart sounds  Pulmonary:      Breath sounds: Rhonchi present  Comments: Decreased effort with diffuse rhonchi  Abdominal:      Palpations: Abdomen is soft  Tenderness: There is abdominal tenderness  There is no right CVA tenderness, left CVA tenderness or guarding  Skin:     General: Skin is warm and dry  Neurological:      General: No focal deficit present  Mental Status: He is alert  Motor: No weakness        Coordination: Coordination normal    Psychiatric:         Mood and Affect: Mood normal          Behavior: Behavior normal          Lab Results:   Results for orders placed or performed during the hospital encounter of 04/09/22   CBC and differential   Result Value Ref Range    WBC 7 75 4 31 - 10 16 Thousand/uL    RBC 4 17 3 88 - 5 62 Million/uL    Hemoglobin 13 4 12 0 - 17 0 g/dL    Hematocrit 39 2 36 5 - 49 3 %    MCV 94 82 - 98 fL    MCH 32 1 26 8 - 34 3 pg    MCHC 34 2 31 4 - 37 4 g/dL    RDW 14 6 11 6 - 15 1 %    MPV 11 1 8 9 - 12 7 fL    Platelets 80 (L) 360 - 390 Thousands/uL    nRBC 0 /100 WBCs    Neutrophils Relative 65 43 - 75 %    Immat GRANS % 0 0 - 2 %    Lymphocytes Relative 19 14 - 44 %    Monocytes Relative 15 (H) 4 - 12 %    Eosinophils Relative 0 0 - 6 %    Basophils Relative 1 0 - 1 %    Neutrophils Absolute 5 05 1 85 - 7 62 Thousands/µL    Immature Grans Absolute 0 03 0 00 - 0 20 Thousand/uL    Lymphocytes Absolute 1 47 0 60 - 4 47 Thousands/µL    Monocytes Absolute 1 13 0 17 - 1 22 Thousand/µL    Eosinophils Absolute 0 02 0 00 - 0 61 Thousand/µL    Basophils Absolute 0 05 0 00 - 0 10 Thousands/µL   Comprehensive metabolic panel   Result Value Ref Range    Sodium 129 (L) 136 - 145 mmol/L    Potassium 5 4 (H) 3 5 - 5 3 mmol/L    Chloride 91 (L) 100 - 108 mmol/L    CO2 26 21 - 32 mmol/L    ANION GAP 12 4 - 13 mmol/L    BUN 35 (H) 5 - 25 mg/dL    Creatinine 1 61 (H) 0 60 - 1 30 mg/dL    Glucose 109 65 - 140 mg/dL    Calcium 9 2 8 3 - 10 1 mg/dL    AST 52 (H) 5 - 45 U/L    ALT 53 12 - 78 U/L    Alkaline Phosphatase 97 46 - 116 U/L    Total Protein 8 1 6 4 - 8 2 g/dL    Albumin 3 9 3 5 - 5 0 g/dL    Total Bilirubin 0 59 0 20 - 1 00 mg/dL    eGFR 46 ml/min/1 73sq m   HS Troponin 0hr (reflex protocol)   Result Value Ref Range    hs TnI 0hr 97 (H) "Refer to ACS Flowchart"- see link ng/L   Magnesium   Result Value Ref Range    Magnesium 1 2 (L) 1 6 - 2 6 mg/dL   HS Troponin I 2hr   Result Value Ref Range    hs TnI 2hr 95 (H) "Refer to ACS Flowchart"- see link ng/L    Delta 2hr hsTnI -2 <20 ng/L   Ethanol   Result Value Ref Range    Ethanol Lvl <3 0 - 3 mg/dL   HS Troponin I 4hr   Result Value Ref Range    hs TnI 4hr 97 (H) "Refer to ACS Flowchart"- see link ng/L    Delta 4hr hsTnI 0 <20 ng/L   APTT   Result Value Ref Range    PTT 28 23 - 37 seconds   Protime-INR   Result Value Ref Range    Protime 13 0 11 6 - 14 5 seconds    INR 1 00 0 84 - 1 19   Urinalysis with microscopic   Result Value Ref Range    Clarity, UA Clear     Color, UA Light Yellow     Specific Gravity, UA 1 010 1 000 - 1 030    pH, UA 7 0 5 0, 5 5, 6 0, 6 5, 7 0, 7 5, 8 0, 8 5, 9 0    Glucose, UA Negative Negative mg/dl    Ketones, UA Negative Negative mg/dl    Blood, UA Negative Negative    Protein, UA Negative Negative mg/dl    Nitrite, UA Negative Negative    Bilirubin, UA Negative Negative    Urobilinogen, UA 0 2 0 2, 1 0 E U /dl E U /dl    Leukocytes, UA Negative Negative    WBC, UA None Seen None Seen, 2-4, 5-60 /hpf    RBC, UA None Seen None Seen, 2-4 /hpf    Bacteria, UA Occasional None Seen, Occasional /hpf    Epithelial Cells Occasional None Seen, Occasional /hpf   Uric acid   Result Value Ref Range    Uric Acid 7 9 4 2 - 8 0 mg/dL   Fingerstick Glucose (POCT)   Result Value Ref Range    POC Glucose 115 65 - 140 mg/dl     Imaging:   XR chest 1 view portable    (Results Pending)     EKG, Pathology, and Other Studies:    Code Status: Level 1 - Full Code  Advance Directive and Living Will:      Power of :    POLST:      Counseling / Coordination of Care: Total floor / unit time spent today 25 minutes

## 2022-04-10 NOTE — ASSESSMENT & PLAN NOTE
Chronic, intermittent    On admission Plt 80  Likely secondary to alcohol abuse, although no prior workup    · JOSHI-13, PT, PTT  · Hold anticoagulation if Plt < 50

## 2022-04-10 NOTE — ASSESSMENT & PLAN NOTE
Chronic, not well followed up    Patient diagnosed with prostate cancer December 2020  Prescribed flomax but reports not taking it at home    · Continue home flomax  · Monitor for urinary retention

## 2022-04-10 NOTE — PLAN OF CARE
Problem: Potential for Falls  Goal: Patient will remain free of falls  Description: INTERVENTIONS:  - Educate patient/family on patient safety including physical limitations  - Instruct patient to call for assistance with activity   - Consult OT/PT to assist with strengthening/mobility   - Keep Call bell within reach  - Keep bed low and locked with side rails adjusted as appropriate  - Keep care items and personal belongings within reach  - Initiate and maintain comfort rounds  - Make Fall Risk Sign visible to staff  - Offer Toileting every 2 Hours, in advance of need  - Initiate/Maintain bed alarm  - Obtain necessary fall risk management equipment: call bell, non-skid footwear  - Apply yellow socks and bracelet for high fall risk patients  - Consider moving patient to room near nurses station  Outcome: Progressing     Problem: PAIN - ADULT  Goal: Verbalizes/displays adequate comfort level or baseline comfort level  Description: Interventions:  - Encourage patient to monitor pain and request assistance  - Assess pain using appropriate pain scale  - Administer analgesics based on type and severity of pain and evaluate response  - Implement non-pharmacological measures as appropriate and evaluate response  - Consider cultural and social influences on pain and pain management  - Notify physician/advanced practitioner if interventions unsuccessful or patient reports new pain  Outcome: Progressing     Problem: INFECTION - ADULT  Goal: Absence or prevention of progression during hospitalization  Description: INTERVENTIONS:  - Assess and monitor for signs and symptoms of infection  - Monitor lab/diagnostic results  - Monitor all insertion sites, i e  indwelling lines, tubes, and drains  - Monitor endotracheal if appropriate and nasal secretions for changes in amount and color  - Peck appropriate cooling/warming therapies per order  - Administer medications as ordered  - Instruct and encourage patient and family to use good hand hygiene technique  - Identify and instruct in appropriate isolation precautions for identified infection/condition  Outcome: Progressing

## 2022-04-10 NOTE — ASSESSMENT & PLAN NOTE
Chronic, stable    History of paroxysmal afib, not on anticoagulation  On admission patient in sinus rhythm    · Continue home medications, see CHF

## 2022-04-10 NOTE — ASSESSMENT & PLAN NOTE
Wt Readings from Last 3 Encounters:   04/10/22 90 3 kg (199 lb 1 2 oz)   03/11/22 86 2 kg (190 lb)   02/10/22 86 1 kg (189 lb 12 8 oz)     Chronic, unchanging    · Last echo (12/2020): EF 55-60%, G1DD  · At home patient takes Norvasc 5mg QD, lopressor 25mg BID, lisinopril 10mg QD, Ranexa 500mg BID  · Continue Norvasc 5mg QD, Lopressor 25 mg b i d , Ranexa 500mg BID  · Hold lisinopril due to ENMA  · Daily weights, Monitor I&O

## 2022-04-10 NOTE — ASSESSMENT & PLAN NOTE
Acute, new finding    In ED initial troponin elevated to 97, no ST changes on EKG  Patient denies any CP or SOB      Consider NSTEMI vs type 2 MI    · Trend troponin  · AM EKG  · Telemetry

## 2022-04-10 NOTE — ASSESSMENT & PLAN NOTE
Chronic, stable    Patient prescribed Breo Ellipta 1 puff q 12, albuterol p r n , however patient reports that he does not use inhalers at home      On admission no wheezing present, no increased WOB    · Duo nebs PRN

## 2022-04-10 NOTE — ASSESSMENT & PLAN NOTE
Lab Results   Component Value Date    HGBA1C 5 1 04/09/2022     Chronic, stable    At home patient takes metformin 1000 mg b i d    Last A1c 5 3 11/19/21    · Hold metformin in setting of ENMA  · SSI

## 2022-04-10 NOTE — ASSESSMENT & PLAN NOTE
Acute, new finding    On admission Na 129  In ED given 1L NS bolus    · Repeat BMP  · Negative UA  · Serum osmol 292, Urine osmol 204, Urine Na 45  · Consider evaluation for glucocorticoid deficiency with ACTH stimulation test, a m   Cortisol, and TSH

## 2022-04-10 NOTE — H&P
H&P Exam - Jon Carrillo 63 y o  male MRN: [id number]    Unit/Bed#: ED 04 Encounter: [id number]      Assessment/Plan     Assessment:  ENMA (acute kidney injury) [facility])  Assessment & Plan  Patient presents with Cr of 1 61, was 1 03 one month ago  On admission patient appears dry    · I/Os  · Daily weights  · Urinary retention protocol    History of Atrial fibrillation (HCC)  Assessment & Plan  History of paroxysmal afib, not on anticoagulation  On admission patient in sinus rhythm    Stable    Continue home medications, see CHF    Chronic heart failure with preserved ejection fraction (HCC)  Assessment & Plan  Wt Readings from Last 3 Encounters:   04/09/22 90 kg (198 lb 6 6 oz)   03/11/22 86 2 kg (190 lb)   02/10/22 86 1 kg (189 lb 12 8 oz)       · Last echo (12/2020): EF 55-60%, G1DD  · At home patient takes Norvasc 5mg QD, lopressor 25mg BID, lisinopril 10mg QD, Ranexa 500mg BID  · Continue Norvasc 5mg QD, Lopressor 25 mg b i d , Ranexa 500mg BID  · Hold lisinopril due to ENMA  · Daily weights  · Monitor I&O         Hyponatremia  Assessment & Plan  On admission Na 129  In ED given 1L NS bolus    · Repeat BMP  · UA, Urine osmol, serum osmol, Urine Na    Thrombocytopenia (HCC)  Assessment & Plan  Chronic  On admission Plt 80  Likely secondary to alcohol abuse, although no prior workup    · JOSHI-13, PT, PTT  · Hold anticoagulation if Plt < 50    History of prostate cancer  Assessment & Plan  Patient diagnosed with prostate cancer December 2020  Prescribed flomax but reports not taking it at home    · Continue home flomax  · Monitor for urinary retention    Alcohol withdrawal ([facility] [address] )  Assessment & Plan  Patient reports drinking one litter bottle of vodka daily, last drank on Friday  Has history of alcohol withdrawal  In ED patient tremulus, given Valium 5mg x2    · Librium taper  · Fall precautions  · Seizure precautions        Hx of CABG  Assessment & Plan  In ED initial troponin elevated to 97, No ST changes on EKG   Patient denies any chest pain  · Tele  · Trend troponins  · Am EKG      Essential hypertension  Assessment & Plan  At home patient takes amlodipine 5mg QD, lisinopril 10mg QD    Type 2 diabetes mellitus [facility])  Assessment & Plan  Lab Results   Component Value Date    HGBA1C 5 3 11/19/2021     At home patient takes metformin 1000 mg b i d  Last A1c 5 3 11/19/21    · Repeat A1c  · Hold metformin in setting of ENMA  · SSI    COPD (chronic obstructive pulmonary disease) ([facility] )  Assessment & Plan  Patient prescribed Breo Ellipta 1 puff q 12, albuterol p r n , however patient reports that he does not use inhalers at home  On admission no wheezing present, no increased WOB    · Duo nebs PRN          Plan:  ***    History of Present Illness     HPI:  Margaret Booth is a 63 y o  male with PMH of CAD, CHF, paroxysmal Afib, HTN, DM,  alcohol abuse, CKD, who presents with generalized weakness, fatigue, and tremors for past two days  Patient is a daily drinker, 12 beers and on liter of vodka daily  Last drank alcohol on Friday  In ED patient also noted to have elevated troponin  ED course: , Valium 5mg x2, NS 1L  PCP: Silviano Agudelo, DO    Review of Systems    Historical Information   Past Medical History:   Diagnosis Date    Cancer [facility])     prostate ca,had radiation    Cardiac disease     stents,then triple bypass    COPD (chronic obstructive pulmonary disease) ([facility] )     Diabetes mellitus ([facility] )     ETOH abuse     Hx of heart artery stent     2014    Hyperlipidemia     Hypertension     Pneumonia     Prostate CA ([facility] )     Renal disorder     pyelonephritis    S/P CABG x 1     2004     Past Surgical History:   Procedure Laterality Date    CORONARY ARTERY BYPASS GRAFT  2004    RI ARTHRODESIS ANT INTERBODY MIN DISCECTOMY, CERVICAL BELOW C2 N/A 12/16/2020    Procedure: Anterior cervical discectomy with fusion C4-C7;  Posterior cervical decompression and fusion C2-T2;  Surgeon: Pau Ramírez MD; Location: [facility];  Service: Neurosurgery    TONSILLECTOMY       Social History   Social History     Substance and Sexual Activity   Alcohol Use Yes    Alcohol/week: 10 0 standard drinks    Types: 10 Shots of liquor per week    Comment: last drink friday     Social History     Substance and Sexual Activity   Drug Use No     Social History     Tobacco Use   Smoking Status Current Every Day Smoker    Packs/day: 1 50    Years: 40 00    Pack years: 60 00    Types: Cigarettes   Smokeless Tobacco Never Used     E-Cigarette/Vaping    E-Cigarette Use Never User       E-Cigarette/Vaping Substances    Nicotine Yes     THC No     CBD No     Flavoring No     Other No     Unknown No      Family History: {[facility] FAM HISTORY SMARTLIST:[id number]}    Meds/Allergies   {[facility] H&P LJEQ:[id number]}  No Known Allergies    Objective   Vitals: Blood pressure 132/71, pulse 72, temperature 97 5 °F (36 4 °C), temperature source Temporal, resp  rate 21, height 6' (1 829 m), weight 90 kg (198 lb 6 6 oz), SpO2 97 %  No intake or output data in the 24 hours ending 04/10/22 0120    Invasive Devices  Report    Peripheral Intravenous Line            Peripheral IV 03/11/22 Distal;Right;Upper;Ventral (anterior) Arm 29 days    Peripheral IV 04/09/22 Left Wrist <1 day                Physical Exam    Lab Results: {Results Review Statement:29157}  Imaging: {Results Review Statement:84149}  EKG, Pathology, and Other Studies: {Results Review Statement:03178}    Code Status: Prior  Advance Directive and Living Will:      Power of :    POLST:      Counseling / Coordination of Care  Total floor / unit time spent today *** minutes  Greater than 50% of total time was spent with the patient and / or family counseling and / or coordination of care  A description of the counseling / coordination of care: ***

## 2022-04-10 NOTE — ASSESSMENT & PLAN NOTE
Prior surgery, stable    In ED initial troponin elevated to 97, No ST changes on EKG  Patient denies any chest pain      · Tele  · Trend troponins  · Am EKG

## 2022-04-10 NOTE — ED PROVIDER NOTES
History  Chief Complaint   Patient presents with    Shaking     Patient states "I have been shaky all day and just feel really off " Also c/o generalized weakness  Hx of diabetes with no recent blood sugar checks    Weakness - Generalized     HPI  Patient is a 17-year-old male presenting for evaluation of 2 days of fatigue, shakiness, general malaise  Patient states that he is a current everyday drinker, drinks about 12 beers a day  Patient states that he last drank 2 days ago and is attempting to stop drinking  Patient states that he previously had a seizure in the setting of alcohol withdrawal in the past   Patient states that is difficult to ambulate secondary to generalized weakness  Patient found walking around outside of his place of residence  Patient denies fevers, chills, nausea, vomiting, chest pain, shortness of breath, abdominal pain  Patient states that he has felt similar symptoms in the past in the setting of alcohol withdrawal   Prior to Admission Medications   Prescriptions Last Dose Informant Patient Reported? Taking? Blood Glucose Monitoring Suppl (ONE TOUCH ULTRA MINI) w/Device KIT Unknown at Unknown time Self Yes No   Sig: Use as directed   Breo Ellipta 200-25 MCG/INH inhaler Past Week at Unknown time  No Yes   Sig: INHALE 1 PUFF BY INHALATION ROUTE EVERY 12 HOURS   RINSE OUT YOUR MOUTH AFTER EACH USE   ONETOUCH DELICA LANCETS FINE MISC Unknown at Unknown time Self Yes No   Sig: 3 (three) times a day Test   Ventolin  (90 Base) MCG/ACT inhaler More than a month at Unknown time  No No   Sig: INHALE 2 PUFFS EVERY 4 HOURS AS NEEDED FOR WHEEZING   acetaminophen (TYLENOL) 325 mg tablet Not Taking at Unknown time  No No   Sig: Take 2 tablets (650 mg total) by mouth every 6 (six) hours as needed for mild pain   Patient not taking: Reported on 4/10/2022    amLODIPine (NORVASC) 5 mg tablet 4/9/2022 at Unknown time  No Yes   Sig: TAKE 1 TABLET BY MOUTH EVERY DAY   diazepam (VALIUM) 5 mg tablet Not Taking at Unknown time  No No   Sig: Take 1 tablet (5 mg total) by mouth every 6 (six) hours as needed for anxiety (alcohol withdrawal) Do NOT mix with alcohol     Patient not taking: Reported on 4/60/5396    folic acid (FOLVITE) 1 mg tablet 4/9/2022 at Unknown time  No Yes   Sig: TAKE 1 TABLET BY MOUTH EVERY DAY   gabapentin (NEURONTIN) 300 mg capsule 4/9/2022 at Unknown time  No Yes   Sig: TAKE 1 CAPSULE BY MOUTH 3 TIMES A DAY   lisinopril (ZESTRIL) 10 mg tablet 4/9/2022 at Unknown time  No Yes   Sig: TAKE 1 TABLET BY MOUTH EVERY DAY   magnesium Oxide (MAG-OX) 400 mg TABS 4/9/2022 at Unknown time  Yes Yes   Sig: Take 400 mg by mouth 2 (two) times a day   metFORMIN (GLUCOPHAGE) 1000 MG tablet 4/9/2022 at Unknown time  No Yes   Sig: TAKE 1 TABLET BY MOUTH EVERY 12 HOURS   metoprolol tartrate (LOPRESSOR) 25 mg tablet 4/9/2022 at Unknown time  No Yes   Sig: TAKE 1 TABLET BY MOUTH EVERY 12 HOURS   omeprazole (PriLOSEC) 20 mg delayed release capsule Unknown at Unknown time  No No   Sig: Take 1 capsule (20 mg total) by mouth daily   pravastatin (PRAVACHOL) 40 mg tablet 4/9/2022 at Unknown time  No Yes   Sig: TAKE 1 TABLET BY MOUTH EVERY DAY WITH DINNER   ranolazine (RANEXA) 500 mg 12 hr tablet 4/9/2022 at Unknown time  No Yes   Sig: TAKE 1 TABLET BY MOUTH EVERY 12 HOURS   tamsulosin (FLOMAX) 0 4 mg More than a month at Unknown time  Yes No   Sig: Take 0 4 mg by mouth daily   thiamine 100 MG tablet 4/9/2022 at Unknown time  No Yes   Sig: TAKE 1 TABLET BY MOUTH EVERY DAY      Facility-Administered Medications: None       Past Medical History:   Diagnosis Date    Cancer [facility])     prostate ca,had radiation    Cardiac disease     stents,then triple bypass    [facility]     Diabetes mellitus ([facility] )     ETOH abuse     Hx of heart artery stent     2014    Hyperlipidemia     Hypertension     Pneumonia     [facility]state [facility] ([facility] )     Renal disorder     pyelonephritis    S/P CABG x 1     2004       Past Surgical History:   Procedure Laterality Date    CORONARY ARTERY BYPASS GRAFT  2004    VT ARTHRODESIS ANT INTERBODY MIN DISCECTOMY, CERVICAL BELOW C2 N/A 12/16/2020    Procedure: Anterior cervical discectomy with fusion C4-C7; Posterior cervical decompression and fusion C2-T2;  Surgeon: Ryan Cobb MD;  [facility]: [facility];  Service: Neurosurgery    TONSILLECTOMY         Family History   Problem Relation Age of Onset    Diabetes Mother     Uterine cancer Mother     COPD Father     Hypertension Father      I have reviewed and agree with the history as documented  E-Cigarette/Vaping    E-Cigarette Use Never User      E-Cigarette/Vaping Substances    Nicotine Yes     THC No     CBD No     Flavoring No     Other No     Unknown No      Social History     Tobacco Use    Smoking status: Current Every Day Smoker     Packs/day: 1 50     Years: 40 00     Pack years: 60 00     Types: Cigarettes    Smokeless tobacco: Never Used   Vaping Use    Vaping Use: Never used   Substance Use Topics    Alcohol use: Yes     Alcohol/week: 10 0 standard drinks     Types: 10 Shots of liquor per week     Comment: last drink friday    Drug use: No       Review of Systems   Constitutional: Positive for fatigue  Negative for chills and fever  HENT: Negative for congestion, rhinorrhea and sore throat  Eyes: Negative for photophobia and visual disturbance  Respiratory: Negative for chest tightness and shortness of breath  Cardiovascular: Negative for chest pain, palpitations and leg swelling  Gastrointestinal: Negative for abdominal distention, abdominal pain, diarrhea, nausea and vomiting  Endocrine: Negative for polydipsia and polyuria  Genitourinary: Negative for dysuria and hematuria  Musculoskeletal: Negative for arthralgias and myalgias  Skin: Negative for color change, pallor, rash and wound  Neurological: Positive for tremors and weakness   Negative for numbness and headaches  Psychiatric/Behavioral: Negative for confusion  Physical Exam  Physical Exam  Vitals and nursing note reviewed  Constitutional:       General: He is not in acute distress  Appearance: He is well-developed  He is not diaphoretic  Comments: Chronically ill-appearing, tremulous, but nontoxic   HENT:      Head: Normocephalic and atraumatic  Comments: Moist mucous membranes     Right Ear: External ear normal       Left Ear: External ear normal       Nose: Nose normal       Mouth/Throat:      Pharynx: No oropharyngeal exudate  Eyes:      Conjunctiva/sclera: Conjunctivae normal       Pupils: Pupils are equal, round, and reactive to light  Cardiovascular:      Rate and Rhythm: Normal rate and regular rhythm  Heart sounds: Normal heart sounds  No murmur heard  No friction rub  No gallop  Pulmonary:      Effort: Pulmonary effort is normal  No respiratory distress  Breath sounds: Normal breath sounds  No wheezing  Comments: No increased work of breathing  Satting in the mid 90s on room air  Lungs clear stent auscultation bilaterally without wheezes, rales, rhonchi  Chest:      Chest wall: No tenderness  Abdominal:      General: Bowel sounds are normal  There is no distension  Palpations: Abdomen is soft  There is no mass  Tenderness: There is no abdominal tenderness  There is no guarding or rebound  Musculoskeletal:         General: No deformity  Skin:     General: Skin is warm and dry  Capillary Refill: Capillary refill takes less than 2 seconds  Neurological:      Mental Status: He is alert and oriented to person, place, and time  Comments: AAO x4  Generalized coarse tremor    Grossly nonfocal neurologic exam    Psychiatric:         Behavior: Behavior normal          Vital Signs  ED Triage Vitals [04/09/22 2153]   Temperature Pulse Respirations Blood Pressure SpO2   97 5 °F (36 4 °C) (!) 108 20 168/85 95 %      Temp Source Heart Rate Source Patient Position - Orthostatic VS BP Location FiO2 (%)   Temporal Monitor Lying Right arm --      Pain Score       No Pain           Vitals:    04/10/22 0300 04/10/22 0821 04/10/22 1206 04/10/22 1551   BP: 141/80 122/80 99/55 101/54   Pulse: 89 94 71 74   Patient Position - Orthostatic VS:  Lying           Visual Acuity  Visual Acuity      Most Recent Value   L Pupil Size (mm) 2   R Pupil Size (mm) 2          ED Medications  Medications   amLODIPine (NORVASC) tablet 5 mg (5 mg Oral Given 2/57/40 9134)   folic acid (FOLVITE) tablet 1,000 mcg (1,000 mcg Oral Given 4/10/22 0827)   metoprolol tartrate (LOPRESSOR) tablet 25 mg (25 mg Oral Given 4/10/22 0827)   pantoprazole (PROTONIX) EC tablet 40 mg (40 mg Oral Given 4/10/22 0507)   pravastatin (PRAVACHOL) tablet 40 mg (has no administration in time range)   ranolazine (RANEXA) 12 hr tablet 500 mg (500 mg Oral Given 4/10/22 0826)   tamsulosin (FLOMAX) capsule 0 4 mg (0 4 mg Oral Given 4/10/22 0826)   thiamine tablet 100 mg (100 mg Oral Given 4/10/22 0826)   nicotine (NICODERM CQ) 21 mg/24 hr TD 24 hr patch 1 patch (1 patch Transdermal Medication Applied 4/10/22 0827)   enoxaparin (LOVENOX) subcutaneous injection 40 mg (40 mg Subcutaneous Given 4/10/22 0828)   chlordiazePOXIDE (LIBRIUM) capsule 50 mg (50 mg Oral Given 4/10/22 1202)     Followed by   chlordiazePOXIDE (LIBRIUM) capsule 50 mg (has no administration in time range)     Followed by   chlordiazePOXIDE (LIBRIUM) capsule 25 mg (has no administration in time range)     Followed by   chlordiazePOXIDE (LIBRIUM) capsule 25 mg (has no administration in time range)   aspirin (ECOTRIN LOW STRENGTH) EC tablet 81 mg (81 mg Oral Given 4/10/22 0827)   sodium chloride 0 9 % infusion (75 mL/hr Intravenous New Bag 4/10/22 0728)   cyanocobalamin (VITAMIN B-12) tablet 1,000 mcg (1,000 mcg Oral Given 4/10/22 0827)   ipratropium-albuterol (DUO-NEB) 0 5-2 5 mg/3 mL inhalation solution 3 mL (3 mL Nebulization Given 4/10/22 1350)   diazepam (VALIUM) injection 5 mg (5 mg Intravenous Given 4/9/22 2213)   diazepam (VALIUM) injection 5 mg (5 mg Intravenous Given 4/9/22 2311)   sodium chloride 0 9 % bolus 1,000 mL (0 mL Intravenous Stopped 4/10/22 0200)   aspirin tablet 325 mg (325 mg Oral Given 4/10/22 0006)   magnesium sulfate 4 g/100 mL IVPB (premix) 4 g (4 g Intravenous New Bag 4/10/22 0823)       Diagnostic Studies  Results Reviewed     Procedure Component Value Units Date/Time    Hemoglobin A1C [[id number]] Collected: 04/09/22 2213    Lab Status: Final result Specimen: Blood from Arm, Left Updated: 04/10/22 1407     Hemoglobin A1C 5 1 %       mg/dl     Protime-INR [[id number]]  (Normal) Collected: 04/10/22 0234    Lab Status: Final result Specimen: Blood from Arm, Left Updated: 04/10/22 0331     Protime 13 0 seconds      INR 1 00    APTT [[id number]]  (Normal) Collected: 04/10/22 0234    Lab Status: Final result Specimen: Blood from Arm, Left Updated: 04/10/22 0331     PTT 28 seconds     Uric acid [[id number]]  (Normal) Collected: 04/09/22 2213    Lab Status: Final result Specimen: Blood from Arm, Left Updated: 04/10/22 0315     Uric Acid 7 9 mg/dL     HS Troponin I 4hr [[id number]]  (Abnormal) Collected: 04/10/22 0234    Lab Status: Final result Specimen: Blood from Arm, Left Updated: 04/10/22 0311     hs TnI 4hr 97 ng/L      Delta 4hr hsTnI 0 ng/L     ADAMS13 Activity [[id number]] Collected: 04/10/22 0234    Lab Status:  In process Specimen: Blood from Arm, Left Updated: 04/10/22 0244    HS Troponin I 2hr [[id number]]  (Abnormal) Collected: 04/10/22 0006    Lab Status: Final result Specimen: Blood from Line, Venous Updated: 04/10/22 0040     hs TnI 2hr 95 ng/L      Delta 2hr hsTnI -2 ng/L     Ethanol [[id number]]  (Normal) Collected: 04/09/22 2334    Lab Status: Final result Specimen: Blood from Arm, Left Updated: 04/10/22 0005     Ethanol Lvl <3 mg/dL     Magnesium [[id number]]  (Abnormal) Collected: 04/09/22 4594    Lab Status: Final result Specimen: Blood from Arm, Left Updated: 04/09/22 2326     Magnesium 1 2 mg/dL     HS Troponin 0hr (reflex protocol) [[id number]]  (Abnormal) Collected: 04/09/22 2213    Lab Status: Final result Specimen: Blood from Arm, Left Updated: 04/09/22 2248     hs TnI 0hr 97 ng/L     Comprehensive metabolic panel [[id number]]  (Abnormal) Collected: 04/09/22 2213    Lab Status: Final result Specimen: Blood from Arm, Left Updated: 04/09/22 2239     Sodium 129 mmol/L      Potassium 5 4 mmol/L      Chloride 91 mmol/L      CO2 26 mmol/L      ANION GAP 12 mmol/L      BUN 35 mg/dL      Creatinine 1 61 mg/dL      Glucose 109 mg/dL      Calcium 9 2 mg/dL      AST 52 U/L      ALT 53 U/L      Alkaline Phosphatase 97 U/L      Total Protein 8 1 g/dL      Albumin 3 9 g/dL      Total Bilirubin 0 59 mg/dL      eGFR 46 ml/min/1 73sq m     Narrative:      [place] guidelines for Chronic Kidney Disease (CKD):     Stage 1 with normal or high GFR (GFR > 90 mL/min/1 73 square meters)    Stage 2 Mild CKD (GFR = 60-89 mL/min/1 73 square meters)    Stage 3A Moderate CKD (GFR = 45-59 mL/min/1 73 square meters)    Stage 3B Moderate CKD (GFR = 30-44 mL/min/1 73 square meters)    Stage 4 Severe CKD (GFR = 15-29 mL/min/1 73 square meters)    Stage 5 End Stage CKD (GFR <15 mL/min/1 73 square meters)  Note: GFR calculation is accurate only with a steady state creatinine    CBC and differential [[id number]]  (Abnormal) Collected: 04/09/22 2213    Lab Status: Final result Specimen: Blood from Arm, Left Updated: 04/09/22 2236     WBC 7 75 Thousand/uL      RBC 4 17 Million/uL      Hemoglobin 13 4 g/dL      Hematocrit 39 2 %      MCV 94 fL      MCH 32 1 pg      MCHC 34 2 g/dL      RDW 14 6 %      MPV 11 1 fL      Platelets 80 Thousands/uL      nRBC 0 /100 WBCs      Neutrophils Relative 65 %      Immat GRANS % 0 %      Lymphocytes Relative 19 %      Monocytes Relative 15 %      Eosinophils Relative 0 %      Basophils Relative 1 %      Neutrophils Absolute 5 05 Thousands/µL      Immature Grans Absolute 0 03 Thousand/uL      Lymphocytes Absolute 1 47 Thousands/µL      Monocytes Absolute 1 13 Thousand/µL      Eosinophils Absolute 0 02 Thousand/µL      Basophils Absolute 0 05 Thousands/µL     Fingerstick Glucose (POCT) [[id number]]  (Normal) Collected: 04/09/22 2156    Lab Status: Final result Updated: 04/09/22 2203     POC Glucose 115 mg/dl                  XR chest 1 view portable   Final Result by Mary Alice Iglesias MD (04/10 1002)      No acute cardiopulmonary disease  Workstation performed: [id number]                    Procedures  Procedures         ED Course                               SBIRT 22yo+      Most Recent Value   SBIRT (24 yo +)    In order to provide better care to our patients, we are screening all of our patients for alcohol and drug use  Would it be okay to ask you these screening questions? No Filed at: 04/09/2022 2205                    MDM  Number of Diagnoses or Management Options  ENMA (acute kidney injury) ([facility] )  Alcohol withdrawal ([facility] )  Hyponatremia  Diagnosis management comments: Weakness, tremors in the setting of alcohol withdrawal   Patient alert and oriented, hypertensive, tachycardic  Denying chest pain  Improvement of tremors and vital signs following total of 10 mg IV Valium  Lab evaluation demonstrating hyponatremia to 129, troponin 97, ENMA with creatinine of 1 6 from 1  Patient given bolus of fluid, admitted for ENMA, hyponatremia, elevated troponin, remaining stable emergency department        Disposition  Final diagnoses:   ENMA (acute kidney injury) ([facility] )   Alcohol withdrawal ([facility] )   Hyponatremia     Time reflects when diagnosis was documented in both MDM as applicable and the Disposition within this note     Time User Action Codes Description Comment    4/10/2022 12:09 AM Nora Lazo Add [N17 9] ENMA (acute kidney injury) ([facility] )     4/10/2022 12:09 AM Delores Yusuf RYLIE Add [F10 239] Alcohol withdrawal ([facility] )     4/10/2022 12:09 AM Yordan Rockwell Add [E87 1] Hyponatremia       ED Disposition     ED Disposition Condition Date/Time Comment    Admit Stable [place] Apr 10, 2022 12:09 AM Case was discussed with family medicine and the patient's admission status was agreed to be Admission Status: inpatient status to the service of Dr Shaye Bauer   Follow-up Information    None         Current Discharge Medication List      CONTINUE these medications which have NOT CHANGED    Details   amLODIPine (NORVASC) 5 mg tablet TAKE 1 TABLET BY MOUTH EVERY DAY  Qty: 90 tablet, Refills: 2    Associated Diagnoses: Essential (primary) hypertension      Breo Ellipta 200-25 MCG/INH inhaler INHALE 1 PUFF BY INHALATION ROUTE EVERY 12 HOURS   RINSE OUT YOUR MOUTH AFTER EACH USE  Qty: 60 each, Refills: 0    Associated Diagnoses: COPD (chronic obstructive pulmonary disease) ([facility])      folic acid (FOLVITE) 1 mg tablet TAKE 1 TABLET BY MOUTH EVERY DAY  Qty: 90 tablet, Refills: 2    Associated Diagnoses: Alcohol abuse      gabapentin (NEURONTIN) 300 mg capsule TAKE 1 CAPSULE BY MOUTH 3 TIMES A DAY  Qty: 90 capsule, Refills: 3    Associated Diagnoses: Edema, spinal cord ([facility])      lisinopril (ZESTRIL) 10 mg tablet TAKE 1 TABLET BY MOUTH EVERY DAY  Qty: 90 tablet, Refills: 0    Associated Diagnoses: Essential (primary) hypertension      magnesium Oxide (MAG-OX) 400 mg TABS Take 400 mg by mouth 2 (two) times a day      metFORMIN (GLUCOPHAGE) 1000 MG tablet TAKE 1 TABLET BY MOUTH EVERY 12 HOURS  Qty: 180 tablet, Refills: 3    Associated Diagnoses: Type 2 diabetes mellitus with hyperosmolarity without coma, without long-term current use of insulin ([facility])      metoprolol tartrate (LOPRESSOR) 25 mg tablet TAKE 1 TABLET BY MOUTH EVERY 12 HOURS  Qty: 180 tablet, Refills: 2    Associated Diagnoses: Essential (primary) hypertension      pravastatin (PRAVACHOL) 40 mg tablet TAKE 1 TABLET BY MOUTH EVERY DAY WITH DINNER  Qty: 90 tablet, Refills: 2    Associated Diagnoses: Essential (primary) hypertension      ranolazine (RANEXA) 500 mg 12 hr tablet TAKE 1 TABLET BY MOUTH EVERY 12 HOURS  Qty: 60 tablet, Refills: 3    Associated Diagnoses: Essential (primary) hypertension      thiamine 100 MG tablet TAKE 1 TABLET BY MOUTH EVERY DAY  Qty: 90 tablet, Refills: 2    Associated Diagnoses: Alcohol abuse      acetaminophen (TYLENOL) 325 mg tablet Take 2 tablets (650 mg total) by mouth every 6 (six) hours as needed for mild pain  Refills: 0    Associated Diagnoses: Fall, initial encounter      Blood Glucose Monitoring Suppl (ONE TOUCH ULTRA MINI) w/Device KIT Use as directed  Refills: 0      diazepam (VALIUM) 5 mg tablet Take 1 tablet (5 mg total) by mouth every 6 (six) hours as needed for anxiety (alcohol withdrawal) Do NOT mix with alcohol  Qty: 15 tablet, Refills: 0    Comments: Cancel librium order  Use Diazepam instead  Associated Diagnoses: Alcohol withdrawal syndrome without complication (HCC)      omeprazole (PriLOSEC) 20 mg delayed release capsule Take 1 capsule (20 mg total) by mouth daily  Qty: 90 capsule, Refills: 3    Associated Diagnoses: Gastroesophageal reflux disease, unspecified whether esophagitis present      ONETOUCH DELICA LANCETS FINE MISC 3 (three) times a day Test  Refills: 0      tamsulosin (FLOMAX) 0 4 mg Take 0 4 mg by mouth daily      Ventolin  (90 Base) MCG/ACT inhaler INHALE 2 PUFFS EVERY 4 HOURS AS NEEDED FOR WHEEZING  Qty: 18 g, Refills: 0    Associated Diagnoses: COPD (chronic obstructive pulmonary disease) ([facility] )             No discharge procedures on file      PDMP Review       Value Time User    PDMP Reviewed  Yes 7/26/2021  4:50 PM Alla Vergara MD          ED Provider  Electronically Signed by           Neel Stone MD  04/10/22 6101

## 2022-04-11 ENCOUNTER — HOSPITAL ENCOUNTER (EMERGENCY)
Facility: HOSPITAL | Age: 62
Discharge: HOME | End: 2022-04-11
Attending: EMERGENCY MEDICINE | Admitting: EMERGENCY MEDICINE
Payer: MEDICARE

## 2022-04-11 VITALS
TEMPERATURE: 97.8 F | HEART RATE: 85 BPM | BODY MASS INDEX: 26.22 KG/M2 | OXYGEN SATURATION: 97 % | DIASTOLIC BLOOD PRESSURE: 74 MMHG | RESPIRATION RATE: 18 BRPM | HEIGHT: 72 IN | SYSTOLIC BLOOD PRESSURE: 118 MMHG | WEIGHT: 193.56 LBS

## 2022-04-11 VITALS
OXYGEN SATURATION: 99 % | SYSTOLIC BLOOD PRESSURE: 164 MMHG | TEMPERATURE: 97.7 F | HEART RATE: 74 BPM | DIASTOLIC BLOOD PRESSURE: 83 MMHG | RESPIRATION RATE: 16 BRPM

## 2022-04-11 DIAGNOSIS — F10.10 ALCOHOL ABUSE: Primary | ICD-10-CM

## 2022-04-11 DIAGNOSIS — F10.10 ALCOHOL ABUSE: ICD-10-CM

## 2022-04-11 PROBLEM — R79.89 ELEVATED TROPONIN: Status: RESOLVED | Noted: 2022-04-10 | Resolved: 2022-04-11

## 2022-04-11 PROBLEM — R57.1 HYPOVOLEMIC SHOCK (HCC): Status: RESOLVED | Noted: 2019-12-22 | Resolved: 2022-04-11

## 2022-04-11 PROBLEM — F10.239 ALCOHOL WITHDRAWAL (HCC): Status: RESOLVED | Noted: 2019-06-07 | Resolved: 2022-04-11

## 2022-04-11 PROBLEM — F10.939 ALCOHOL WITHDRAWAL (HCC): Status: RESOLVED | Noted: 2019-06-07 | Resolved: 2022-04-11

## 2022-04-11 PROBLEM — N17.9 AKI (ACUTE KIDNEY INJURY) (HCC): Status: RESOLVED | Noted: 2022-04-10 | Resolved: 2022-04-11

## 2022-04-11 PROBLEM — R77.8 ELEVATED TROPONIN: Status: RESOLVED | Noted: 2022-04-10 | Resolved: 2022-04-11

## 2022-04-11 LAB
ANION GAP SERPL CALCULATED.3IONS-SCNC: 8 MMOL/L (ref 4–13)
ATRIAL RATE: 98 BPM
BASOPHILS # BLD AUTO: 0.03 THOUSANDS/ΜL (ref 0–0.1)
BASOPHILS NFR BLD AUTO: 1 % (ref 0–1)
BUN SERPL-MCNC: 34 MG/DL (ref 5–25)
CALCIUM SERPL-MCNC: 8.6 MG/DL (ref 8.3–10.1)
CHLORIDE SERPL-SCNC: 98 MMOL/L (ref 100–108)
CO2 SERPL-SCNC: 26 MMOL/L (ref 21–32)
CREAT SERPL-MCNC: 1.34 MG/DL (ref 0.6–1.3)
EOSINOPHIL # BLD AUTO: 0.16 THOUSAND/ΜL (ref 0–0.61)
EOSINOPHIL NFR BLD AUTO: 3 % (ref 0–6)
ERYTHROCYTE [DISTWIDTH] IN BLOOD BY AUTOMATED COUNT: 14.9 % (ref 11.6–15.1)
GFR SERPL CREATININE-BSD FRML MDRD: 57 ML/MIN/1.73SQ M
GLUCOSE SERPL-MCNC: 116 MG/DL (ref 65–140)
HCT VFR BLD AUTO: 33.5 % (ref 36.5–49.3)
HGB BLD-MCNC: 11 G/DL (ref 12–17)
IMM GRANULOCYTES # BLD AUTO: 0.03 THOUSAND/UL (ref 0–0.2)
IMM GRANULOCYTES NFR BLD AUTO: 1 % (ref 0–2)
LYMPHOCYTES # BLD AUTO: 1.15 THOUSANDS/ΜL (ref 0.6–4.47)
LYMPHOCYTES NFR BLD AUTO: 21 % (ref 14–44)
MAGNESIUM SERPL-MCNC: 1.9 MG/DL (ref 1.6–2.6)
MCH RBC QN AUTO: 32.2 PG (ref 26.8–34.3)
MCHC RBC AUTO-ENTMCNC: 32.8 G/DL (ref 31.4–37.4)
MCV RBC AUTO: 98 FL (ref 82–98)
MONOCYTES # BLD AUTO: 0.73 THOUSAND/ΜL (ref 0.17–1.22)
MONOCYTES NFR BLD AUTO: 13 % (ref 4–12)
NEUTROPHILS # BLD AUTO: 3.39 THOUSANDS/ΜL (ref 1.85–7.62)
NEUTS SEG NFR BLD AUTO: 61 % (ref 43–75)
NRBC BLD AUTO-RTO: 0 /100 WBCS
P AXIS: 64 DEGREES
PHOSPHATE SERPL-MCNC: 3.4 MG/DL (ref 2.3–4.1)
PLATELET # BLD AUTO: 54 THOUSANDS/UL (ref 149–390)
PMV BLD AUTO: 11.4 FL (ref 8.9–12.7)
POTASSIUM SERPL-SCNC: 4.5 MMOL/L (ref 3.5–5.3)
PR INTERVAL: 130 MS
QRS AXIS: 75 DEGREES
QRSD INTERVAL: 84 MS
QT INTERVAL: 334 MS
QTC INTERVAL: 426 MS
RBC # BLD AUTO: 3.42 MILLION/UL (ref 3.88–5.62)
SODIUM SERPL-SCNC: 132 MMOL/L (ref 136–145)
T WAVE AXIS: 24 DEGREES
VENTRICULAR RATE: 98 BPM
WBC # BLD AUTO: 5.49 THOUSAND/UL (ref 4.31–10.16)

## 2022-04-11 PROCEDURE — 93010 ELECTROCARDIOGRAM REPORT: CPT | Performed by: INTERNAL MEDICINE

## 2022-04-11 PROCEDURE — 85025 COMPLETE CBC W/AUTO DIFF WBC: CPT

## 2022-04-11 PROCEDURE — 94760 N-INVAS EAR/PLS OXIMETRY 1: CPT

## 2022-04-11 PROCEDURE — 99284 EMERGENCY DEPT VISIT MOD MDM: CPT | Performed by: EMERGENCY MEDICINE

## 2022-04-11 PROCEDURE — 83735 ASSAY OF MAGNESIUM: CPT

## 2022-04-11 PROCEDURE — 99283 EMERGENCY DEPT VISIT LOW MDM: CPT

## 2022-04-11 PROCEDURE — 97162 PT EVAL MOD COMPLEX 30 MIN: CPT

## 2022-04-11 PROCEDURE — NC001 PR NO CHARGE: Performed by: FAMILY MEDICINE

## 2022-04-11 PROCEDURE — 80048 BASIC METABOLIC PNL TOTAL CA: CPT | Performed by: STUDENT IN AN ORGANIZED HEALTH CARE EDUCATION/TRAINING PROGRAM

## 2022-04-11 PROCEDURE — 82024 ASSAY OF ACTH: CPT | Performed by: STUDENT IN AN ORGANIZED HEALTH CARE EDUCATION/TRAINING PROGRAM

## 2022-04-11 PROCEDURE — 94640 AIRWAY INHALATION TREATMENT: CPT

## 2022-04-11 PROCEDURE — 99239 HOSP IP/OBS DSCHRG MGMT >30: CPT | Performed by: FAMILY MEDICINE

## 2022-04-11 PROCEDURE — 84100 ASSAY OF PHOSPHORUS: CPT

## 2022-04-11 RX ORDER — CHLORDIAZEPOXIDE HYDROCHLORIDE 25 MG/1
50 CAPSULE, GELATIN COATED ORAL ONCE
Status: COMPLETED | OUTPATIENT
Start: 2022-04-11 | End: 2022-04-11

## 2022-04-11 RX ORDER — NICOTINE 21 MG/24HR
1 PATCH, TRANSDERMAL 24 HOURS TRANSDERMAL DAILY
Qty: 28 PATCH | Refills: 0 | Status: SHIPPED | OUTPATIENT
Start: 2022-04-12 | End: 2022-06-09

## 2022-04-11 RX ORDER — CHLORDIAZEPOXIDE HYDROCHLORIDE 25 MG/1
25 CAPSULE, GELATIN COATED ORAL 3 TIMES DAILY PRN
Qty: 9 CAPSULE | Refills: 0
Start: 2022-04-11 | End: 2022-04-13 | Stop reason: SDUPTHER

## 2022-04-11 RX ORDER — CHLORDIAZEPOXIDE HYDROCHLORIDE 25 MG/1
25 CAPSULE, GELATIN COATED ORAL 3 TIMES DAILY PRN
Qty: 21 CAPSULE | Refills: 0 | Status: SHIPPED | OUTPATIENT
Start: 2022-04-11 | End: 2022-06-09

## 2022-04-11 RX ORDER — CHLORDIAZEPOXIDE HYDROCHLORIDE 25 MG/1
25 CAPSULE, GELATIN COATED ORAL ONCE
Status: DISCONTINUED | OUTPATIENT
Start: 2022-04-11 | End: 2022-04-11

## 2022-04-11 RX ORDER — CHLORDIAZEPOXIDE HYDROCHLORIDE 25 MG/1
25 CAPSULE, GELATIN COATED ORAL 3 TIMES DAILY PRN
Qty: 30 CAPSULE | Refills: 0
Start: 2022-04-11 | End: 2022-04-11 | Stop reason: SDUPTHER

## 2022-04-11 RX ADMIN — FOLIC ACID 1000 MCG: 1 TABLET ORAL at 08:41

## 2022-04-11 RX ADMIN — THIAMINE HCL TAB 100 MG 100 MG: 100 TAB at 08:41

## 2022-04-11 RX ADMIN — PANTOPRAZOLE SODIUM 40 MG: 40 TABLET, DELAYED RELEASE ORAL at 06:02

## 2022-04-11 RX ADMIN — IPRATROPIUM BROMIDE AND ALBUTEROL SULFATE 3 ML: 2.5; .5 SOLUTION RESPIRATORY (INHALATION) at 07:29

## 2022-04-11 RX ADMIN — RANOLAZINE 500 MG: 500 TABLET, EXTENDED RELEASE ORAL at 08:41

## 2022-04-11 RX ADMIN — NICOTINE 1 PATCH: 21 PATCH, EXTENDED RELEASE TRANSDERMAL at 08:42

## 2022-04-11 RX ADMIN — METOPROLOL TARTRATE 25 MG: 25 TABLET, FILM COATED ORAL at 08:41

## 2022-04-11 RX ADMIN — CYANOCOBALAMIN TAB 500 MCG 1000 MCG: 500 TAB at 08:41

## 2022-04-11 RX ADMIN — IPRATROPIUM BROMIDE AND ALBUTEROL SULFATE 3 ML: 2.5; .5 SOLUTION RESPIRATORY (INHALATION) at 02:49

## 2022-04-11 RX ADMIN — ASPIRIN 81 MG: 81 TABLET, COATED ORAL at 08:41

## 2022-04-11 RX ADMIN — CHLORDIAZEPOXIDE HYDROCHLORIDE 50 MG: 25 CAPSULE ORAL at 11:44

## 2022-04-11 RX ADMIN — TAMSULOSIN HYDROCHLORIDE 0.4 MG: 0.4 CAPSULE ORAL at 08:41

## 2022-04-11 RX ADMIN — CHLORDIAZEPOXIDE HYDROCHLORIDE 50 MG: 25 CAPSULE ORAL at 06:01

## 2022-04-11 RX ADMIN — AMLODIPINE BESYLATE 5 MG: 5 TABLET ORAL at 08:41

## 2022-04-11 RX ADMIN — CHLORDIAZEPOXIDE HYDROCHLORIDE 50 MG: 25 CAPSULE ORAL at 20:49

## 2022-04-11 RX ADMIN — ENOXAPARIN SODIUM 40 MG: 40 INJECTION SUBCUTANEOUS at 08:41

## 2022-04-11 RX ADMIN — SODIUM CHLORIDE 75 ML/HR: 0.9 INJECTION, SOLUTION INTRAVENOUS at 00:20

## 2022-04-11 NOTE — PHYSICAL THERAPY NOTE
PHYSICAL THERAPY EVALUATION       04/11/22 1205   Note Type   Note type Evaluation   Pain Assessment   Pain Assessment Tool 0-10   Pain Score No Pain   Restrictions/Precautions   Other Precautions Fall Risk   Home Living   Type of Home Apartment   Home Layout One level;Elevator   Home Equipment Walker   Prior Function   Level of [place] Independent with ADLs and functional mobility   Lives With Alone   Receives Help From Friend(s)   General   Additional Pertinent History Pt admitted with ETOH withdrawl and weakness  Pt report he is still weak but better and that he is going home today  Cognition   Arousal/Participation Cooperative   Subjective   Subjective "I have to take the bus home"   RLE Assessment   RLE Assessment   (ROM WFL, MMT 4/5 x DF 3-/5)   LLE Assessment   LLE Assessment   (ROM WFL, MMT 4-/5 x DF 3-/5)   Bed Mobility   Supine to Sit 7  Independent   Sit to Supine 7  Independent   Transfers   Sit to Stand 7  Independent   Stand to Sit 7  Independent   Stand pivot 7  Independent   Ambulation/Elevation   Gait pattern Steppage   Gait Assistance 6  Modified independent   Additional items   (flexed posture, steppage gait)   Assistive Device Rolling walker   Distance 200 feet; Pt also observed walking in his room without a device independently holding onto furniture x 20 feet  Balance   Static Sitting Fair +   Dynamic Sitting Fair +   Static Standing Fair +   Dynamic Standing Fair   Ambulatory Fair   Assessment   Prognosis Good   Problem List Decreased strength; Impaired balance;Decreased mobility   Assessment Patient seen for Physical Therapy evaluation  Patient admitted with Alcohol withdrawal ([facility] [address] )  Comorbidities affecting patient's physical performance include: ETOH abuse, DM, COPD, afib, depression, hx neck surgery  Personal factors affecting patient at time of initial evaluation include: ambulating with assistive device and inability to navigate community distances   Prior to admission, patient was independent with functional mobility without assistive device and independent with ADLS  Please find objective findings from Physical Therapy assessment regarding body systems outlined above with impairments and limitations including weakness, impaired balance, gait deviations and decreased functional mobility tolerance  The Barthel Index was used as a functional outcome tool presenting with a score of Barthel Index Score: 80 today indicating moderate limitations of functional mobility and ADLS  Patient's clinical presentation is currently evolving as seen in patient's presentation of increased fall risk, new onset of impairment of functional mobility, decreased endurance and new onset of weakness  Pt would benefit from continued Physical Therapy treatment to address deficits as defined above and maximize level of functional mobility  As demonstrated by objective findings, the assigned level of complexity for this evaluation is moderate  The patient's [facility] Basic Mobility Inpatient Short Form Raw Score is 23  A Raw score of greater than 16 suggests the patient may benefit from discharge to home  Goals   Patient Goals "go home"   [facility] Expiration Date 04/18/22   Short Term Goal #1 independent ambulation indoor level surfaces with good posture and a steady gait 100 feet, improve standing dynamic balance to at least fair + to decrease fall risk   LTG Expiration Date 04/25/22   Long Term Goal #1 independent ambulation outdoor surfaces without device 400 feet with a normal steady gait  Plan   Treatment/Interventions Gait training; Therapeutic exercise;LE strengthening/ROM   PT Frequency Other (Comment)  (5w)   Recommendation   PT Discharge Recommendation No rehabilitation needs   Equipment Recommended   (pt has a walker)   [facility] Basic Mobility Inpatient   Turning in Bed Without Bedrails 4   Lying on Back to Sitting on Edge of Flat Bed 4   Moving Bed to Chair 4   Standing Up From Chair 4   Walk in Room 4 Climb 3-5 Stairs 3   Basic Mobility Inpatient Raw Score 23   Basic Mobility Standardized Score 50 88   Highest Level Of Mobility   -[facility] Goal 7: Walk 25 feet or more   [facility] Highest Level of Mobility 8: Walk 250 feet ot more   -HL Goal Achieved Yes   Barthel Index   Feeding 10   Bathing 0   Grooming Score 5   Dressing Score 10   Bladder Score 10   Bowels Score 10   Toilet Use Score 10   Transfers (Bed/Chair) Score 10   Mobility (Level Surface) Score 10   Stairs Score 5   Barthel Index Score [facility]   Licensure   [place] License Number  [place]  [id number]

## 2022-04-11 NOTE — PLAN OF CARE
Problem: Potential for Falls  Goal: Patient will remain free of falls  Description: INTERVENTIONS:  - Educate patient/family on patient safety including physical limitations  - Instruct patient to call for assistance with activity   - Consult OT/PT to assist with strengthening/mobility   - Keep Call bell within reach  - Keep bed low and locked with side rails adjusted as appropriate  - Keep care items and personal belongings within reach  - Initiate and maintain comfort rounds  - Make Fall Risk Sign visible to staff  - Apply yellow socks and bracelet for high fall risk patients  - Consider moving patient to room near nurses station  4/11/2022 1132 by Frank Spain RN  Outcome: Adequate for Discharge  4/11/2022 0738 by Frank Spain RN  Outcome: Progressing     Problem: MOBILITY - ADULT  Goal: Maintain or return to baseline ADL function  Description: INTERVENTIONS:  -  Assess patient's ability to carry out ADLs; assess patient's baseline for ADL function and identify physical deficits which impact ability to perform ADLs (bathing, care of mouth/teeth, toileting, grooming, dressing, etc )  - Assess/evaluate cause of self-care deficits   - Assess range of motion  - Assess patient's mobility; develop plan if impaired  - Assess patient's need for assistive devices and provide as appropriate  - Encourage maximum independence but intervene and supervise when necessary  - Involve family in performance of ADLs  - Assess for home care needs following discharge   - Consider OT consult to assist with ADL evaluation and planning for discharge  - Provide patient education as appropriate  4/11/2022 1132 by Frank Spain RN  Outcome: Adequate for Discharge  4/11/2022 0738 by Frank Spain RN  Outcome: Progressing  Goal: Maintains/Returns to pre admission functional level  Description: INTERVENTIONS:  - Perform BMAT or MOVE assessment daily    - Set and communicate daily mobility goal to care team and patient/family/caregiver  - Collaborate with rehabilitation services on mobility goals if consulted  - Out of bed for toileting  - Record patient progress and toleration of activity level   4/11/2022 1132 by Cindy Thao RN  Outcome: Adequate for Discharge  4/11/2022 0738 by Cindy Thao RN  Outcome: Progressing     Problem: CARDIOVASCULAR - ADULT  Goal: Maintains optimal cardiac output and hemodynamic stability  Description: INTERVENTIONS:  - Monitor I/O, vital signs and rhythm  - Monitor for S/S and trends of decreased cardiac output  - Administer and titrate ordered vasoactive medications to optimize hemodynamic stability  - Assess quality of pulses, skin color and temperature  - Assess for signs of decreased coronary artery perfusion  - Instruct patient to report change in severity of symptoms  4/11/2022 1132 by Cindy Thao RN  Outcome: Adequate for Discharge  4/11/2022 0738 by Cindy Thao RN  Outcome: Progressing  Goal: Absence of cardiac dysrhythmias or at baseline rhythm  Description: INTERVENTIONS:  - Continuous cardiac monitoring, vital signs, obtain 12 lead EKG if ordered  - Administer antiarrhythmic and heart rate control medications as ordered  - Monitor electrolytes and administer replacement therapy as ordered  4/11/2022 1132 by Cindy Thao RN  Outcome: Adequate for Discharge  4/11/2022 0738 by Cindy Thao RN  Outcome: Progressing

## 2022-04-11 NOTE — CASE MANAGEMENT
Case Management Assessment & Discharge Planning Note    Patient name Collins Escoto  Location [address] MRN [id number]  : 1960 Date 2022       Current Admission Date: 2022  Current Admission Diagnosis:Type 2 diabetes mellitus [facility])   Patient Active Problem List    Diagnosis Date Noted    Lightheadedness 10/25/2021    History of atrial fibrillation 10/25/2021    Poor historian 10/24/2021    Mild protein-calorie malnutrition ([address] ) 2021    Prostate cancer ([facility] [address] ) 2021    Chest pain 2021    Onychomycosis of left great toe 2021    Clostridium difficile diarrhea 2021    Imbalanced nutrition 2021    Physical deconditioning 2021    Hepatitis C antibody test positive 2021    Hypocalcemia 2021    Transaminitis 2021    Central cord syndrome ([address] ) 2020    Edema, spinal cord ([address] ) 2020    Acute on chronic kidney failure ([address] )     Pain in both upper extremities 2020    History of Atrial fibrillation ([address] ) 2020    Diarrhea 2019    Chronic heart failure with preserved ejection fraction ([address] ) 2019    Medical non-compliance 2019    Back pain 2019    Alcohol abuse 10/17/2019    Hyponatremia 10/17/2019    Cervical spinal stenosis 10/17/2019    Thrombocytopenia ([address] ) 2019    History of prostate cancer 2019    CAD (coronary artery disease) 2019    Nicotine abuse 2019    Hypomagnesemia 10/10/2018    Depression, recurrent ([address] ) 10/10/2018    Hx of CABG 10/10/2018    Dyslipidemia 10/10/2018    COPD (chronic obstructive pulmonary disease) ([address] ) 10/09/2018    Alcohol withdrawal syndrome without complication ([address] )     Type 2 diabetes mellitus ([facility] [address] ) 10/09/2018    Essential hypertension 10/09/2018      LOS (days): 1  Geometric Mean LOS (GMLOS) (days):   Days to GMLOS:     OBJECTIVE:    Risk of Unplanned Readmission Score: 33 Current admission status: Inpatient       Preferred Pharmacy:   [address] [address]  Phone: [phone] Fax: [address], [phone] [facility]  Phone: [phone] Fax: [phone]    UNKNOWN - FOLLOW UP PRIOR TO DISCHARGE TO E-PRESCRIBE  No address on file      Primary Care Provider: Dora Vázquez DO    Primary Insurance: MEDICARE  Secondary Insurance:     ASSESSMENT:  [facility], [facility] Representative - Son   Primary Phone: [phone] (Mobile)                 Readmission Root Cause  30 Day Readmission: No    Patient Information  Admitted from[de-identified] Home  Mental Status: Alert  During Assessment patient was accompanied by: Not accompanied during assessment  Assessment information provided by[de-identified] Patient  Primary Caregiver: Self  Support Systems: [address] of Residence: [address] [place] [address] do you live in?: [address] [place] [address] entry access options   Select all that apply : Elevator  Type of Current Residence: Apartment  Floor Level: 6  Upon entering residence, is there a bedroom on the main floor (no further steps)?: Yes  Upon entering residence, is there a bathroom on the main floor (no further steps)?: Yes  Homeless/housing insecurity resource given?: N/A  Living Arrangements: Lives Alone    Activities of Daily Living Prior to Admission  Functional Status: Independent  Completes ADLs independently?: Yes  Ambulates independently?: Yes  Does patient use assisted devices?: Yes  Does patient currently own DME?: Yes  What DME does the patient currently own?: [employer]         Patient Information Continued  Does patient have a history of substance abuse?: Yes  Historical substance use preference: Alcohol/ETOH  History of Withdrawal Symptoms: Seizures  Is patient currently in treatment for substance abuse?: Yes (pt states he see family guidance)         Means of Transportation  Me[facility] to [facility][de-identified] [employer] - Bus        DISCHARGE DETAILS:    Discharge planning discussed with[de-identified] patient  Freedom of Choice: Yes  Comments - Freedom of Choice: pt understands ability to be involved in hospital stay and discharge planning  pt wants to leave today, and states he can take the bus home  he doesn't want any inpt psych help, only wishes to keep following with family guidance  CM contacted family/caregiver?: No- see comments (pt declined)  Were Treatment Team discharge recommendations reviewed with patient/caregiver?: Yes  Did patient/caregiver verbalize understanding of patient care needs?: Yes  Were patient/caregiver advised of the risks associated with not following Treatment Team discharge recommendations?: Yes         Requested [address]         [facility] the [facility] at discharge?: No    DME Referral Provided  Referral made for DME?: No         Would you like to participate in our [address] service program?  : No - Declined       Cm spoke with pt, pt was very upset he never got his food tray  CM following up with nursing CC to check on service order  Pt expressed that he wishes to go home today and can take the bus home  Cm notified RN of pts wishes  Will discuss during team rounds

## 2022-04-11 NOTE — TELEPHONE ENCOUNTER
Patient is calling because he states that he needs his [employer] tonight  Please call into [facility] in [place]

## 2022-04-11 NOTE — TELEPHONE ENCOUNTER
----- Message from Michelle Turcios DO sent at 4/11/2022  5:12 PM EDT -----  Regarding: TCM  Needs TCM for alcohol withdrawal  DC date was 4/11/22      Thank you!

## 2022-04-11 NOTE — PLAN OF CARE
Problem: Potential for Falls  Goal: Patient will remain free of falls  Description: INTERVENTIONS:  - Educate patient/family on patient safety including physical limitations  - Instruct patient to call for assistance with activity   - Consult OT/PT to assist with strengthening/mobility   - Keep Call bell within reach  - Keep bed low and locked with side rails adjusted as appropriate  - Keep care items and personal belongings within reach  - Initiate and maintain comfort rounds  - Make Fall Risk Sign visible to staff  - Initiate/Maintain bed alarm  - Apply yellow socks and bracelet for high fall risk patients  - Consider moving patient to room near nurses station  Outcome: Progressing     Problem: MOBILITY - ADULT  Goal: Maintain or return to baseline ADL function  Description: INTERVENTIONS:  -  Assess patient's ability to carry out ADLs; assess patient's baseline for ADL function and identify physical deficits which impact ability to perform ADLs (bathing, care of mouth/teeth, toileting, grooming, dressing, etc )  - Assess/evaluate cause of self-care deficits   - Assess range of motion  - Assess patient's mobility; develop plan if impaired  - Assess patient's need for assistive devices and provide as appropriate  - Encourage maximum independence but intervene and supervise when necessary  - Involve family in performance of ADLs  - Assess for home care needs following discharge   - Consider OT consult to assist with ADL evaluation and planning for discharge  - Provide patient education as appropriate  Outcome: Progressing  Goal: Maintains/Returns to pre admission functional level  Description: INTERVENTIONS:  - Perform BMAT or MOVE assessment daily    - Set and communicate daily mobility goal to care team and patient/family/caregiver  - Collaborate with rehabilitation services on mobility goals if consulted  - Perform Range of Motion 2 times a day    - Dangle patient 2 times a day  - Stand patient 2 times a day  - Ambulate patient 2 times a day  - Out of bed to chair 2 times a day   - Out of bed for meals 2 times a day  - Out of bed for toileting  - Record patient progress and toleration of activity level   Outcome: Progressing     Problem: CARDIOVASCULAR - ADULT  Goal: Maintains optimal cardiac output and hemodynamic stability  Description: INTERVENTIONS:  - Monitor I/O, vital signs and rhythm  - Monitor for S/S and trends of decreased cardiac output  - Administer and titrate ordered vasoactive medications to optimize hemodynamic stability  - Assess quality of pulses, skin color and temperature  - Assess for signs of decreased coronary artery perfusion  - Instruct patient to report change in severity of symptoms  Outcome: Progressing  Goal: Absence of cardiac dysrhythmias or at baseline rhythm  Description: INTERVENTIONS:  - Continuous cardiac monitoring, vital signs, obtain 12 lead EKG if ordered  - Administer antiarrhythmic and heart rate control medications as ordered  - Monitor electrolytes and administer replacement therapy as ordered  Outcome: Progressing     Problem: RESPIRATORY - ADULT  Goal: Achieves optimal ventilation and oxygenation  Description: INTERVENTIONS:  - Assess for changes in respiratory status  - Assess for changes in mentation and behavior  - Position to facilitate oxygenation and minimize respiratory effort  - Oxygen administered by appropriate delivery if ordered  - Initiate smoking cessation education as indicated  - Encourage broncho-pulmonary hygiene including cough, deep breathe, Incentive Spirometry  - Assess the need for suctioning and aspirate as needed  - Assess and instruct to report SOB or any respiratory difficulty  - Respiratory Therapy support as indicated  Outcome: Progressing     Problem: PAIN - ADULT  Goal: Verbalizes/displays adequate comfort level or baseline comfort level  Description: Interventions:  - Encourage patient to monitor pain and request assistance  - Assess pain using appropriate pain scale  - Administer analgesics based on type and severity of pain and evaluate response  - Implement non-pharmacological measures as appropriate and evaluate response  - Consider cultural and social influences on pain and pain management  - Notify physician/advanced practitioner if interventions unsuccessful or patient reports new pain  Outcome: Progressing     Problem: INFECTION - ADULT  Goal: Absence or prevention of progression during hospitalization  Description: INTERVENTIONS:  - Assess and monitor for signs and symptoms of infection  - Monitor lab/diagnostic results  - Monitor all insertion sites, i e  indwelling lines, tubes, and drains  - Monitor endotracheal if appropriate and nasal secretions for changes in amount and color  - [place] appropriate cooling/warming therapies per order  - Administer medications as ordered  - Instruct and encourage patient and family to use good hand hygiene technique  - Identify and instruct in appropriate isolation precautions for identified infection/condition  Outcome: Progressing     Problem: SAFETY ADULT  Goal: Patient will remain free of falls  Description: INTERVENTIONS:  - Educate patient/family on patient safety including physical limitations  - Instruct patient to call for assistance with activity   - Consult OT/PT to assist with strengthening/mobility   - Keep Call bell within reach  - Keep bed low and locked with side rails adjusted as appropriate  - Keep care items and personal belongings within reach  - Initiate and maintain comfort rounds  - Make Fall Risk Sign visible to staff  - Initiate/Maintain bed alarm  - Apply yellow socks and bracelet for high fall risk patients  - Consider moving patient to room near nurses station  Outcome: Progressing  Goal: Maintain or return to baseline ADL function  Description: INTERVENTIONS:  -  Assess patient's ability to carry out ADLs; assess patient's baseline for ADL function and identify physical deficits which impact ability to perform ADLs (bathing, care of mouth/teeth, toileting, grooming, dressing, etc )  - Assess/evaluate cause of self-care deficits   - Assess range of motion  - Assess patient's mobility; develop plan if impaired  - Assess patient's need for assistive devices and provide as appropriate  - Encourage maximum independence but intervene and supervise when necessary  - Involve family in performance of ADLs  - Assess for home care needs following discharge   - Consider OT consult to assist with ADL evaluation and planning for discharge  - Provide patient education as appropriate  Outcome: Progressing  Goal: Maintains/Returns to pre admission functional level  Description: INTERVENTIONS:  - Perform BMAT or MOVE assessment daily    - Set and communicate daily mobility goal to care team and patient/family/caregiver  - Collaborate with rehabilitation services on mobility goals if consulted  - Perform Range of Motion 2 times a day    - Dangle patient 2 times a day  - Stand patient 2 times a day  - Ambulate patient 2 times a day  - Out of bed to chair 2 times a day   - Out of bed for meals 2 times a day  - Out of bed for toileting  - Record patient progress and toleration of activity level   Outcome: Progressing     Problem: DISCHARGE PLANNING  Goal: Discharge to home or other facility with appropriate resources  Description: INTERVENTIONS:  - Identify barriers to discharge w/patient and caregiver  - Arrange for needed discharge resources and transportation as appropriate  - Identify discharge learning needs (meds, wound care, etc )  - Arrange for interpretive services to assist at discharge as needed  - Refer to Case Management Department for coordinating discharge planning if the patient needs post-hospital services based on physician/advanced practitioner order or complex needs related to functional status, cognitive ability, or social support system  Outcome: Progressing     Problem: Knowledge Deficit  Goal: Patient/family/caregiver demonstrates understanding of disease process, treatment plan, medications, and discharge instructions  Description: Complete learning assessment and assess knowledge base    Interventions:  - Provide teaching at level of understanding  - Provide teaching via preferred learning methods  Outcome: Progressing

## 2022-04-11 NOTE — PROGRESS NOTES
[address] [facility] 67 [address] 84 [facility] Practice Progress Note - Gabe Cifuentes 63 y o  male MRN: [id number]    Unit/Bed#: [address] [place] [address] Encounter: [id number]      Assessment/Plan:    * Alcohol withdrawal [facility])  Assessment & Plan  Acute on chronic, intermittent    Patient reports drinking one litter bottle of vodka daily, last drank on Friday  Has history of alcohol withdrawal  In ED patient tremulus, given Valium 5mg x2    · Librium taper  · Fall precautions  · Seizure precautions  · CIWA  · Thiamine and folate replacement    ENMA (acute kidney injury) ([facility] [address] )  Assessment & Plan  Acute, reoccurring    Patient presents with Cr of 1 61, was 1 03 one month ago  On admission patient appears dry    · I/Os  · Daily weights  · Urinary retention protocol  Continue with sodium chloride 0 9% 75 mL/hr    Chronic heart failure with preserved ejection fraction (HCC)  Assessment & Plan  Wt Readings from Last 3 Encounters:   04/10/22 90 3 kg (199 lb 1 2 oz)   22 86 2 kg (190 lb)   02/10/22 86 1 kg (189 lb 12 8 oz)     Chronic, unchanging    · Last echo (2020): EF 55-60%, G1DD  · At home patient takes Norvasc 5mg QD, lopressor 25mg BID, lisinopril 10mg QD, Ranexa 500mg BID  · Continue Norvasc 5mg QD, Lopressor 25 mg b i d , Ranexa 500mg BID  · Hold lisinopril due to ENMA  · Daily weights, Monitor I&O    History of Atrial fibrillation (HCC)  Assessment & Plan  Chronic, stable    History of paroxysmal afib, not on anticoagulation  On admission patient in sinus rhythm    · Continue home medications, see CHF    Hyponatremia  Assessment & Plan  Acute, new finding    On admission Na 129  In ED given 1L NS bolus    · Repeat BMP  · Negative UA  · Serum osmol 292, Urine osmol 204, Urine Na 45  · Consider evaluation for glucocorticoid deficiency with ACTH stimulation test, a m   Cortisol, and TSH    CAD (coronary artery disease)  Assessment & Plan  Chronic, stable    Hx of CAD s/p CABG , PCI to [facility]   Not on Plavix/Eliquis given history of alcohol abuse and falls, high risk of bleeding  On admission troponin was elevated however delta was decreasing  · Continue monitor  · Cardio consult      COPD (chronic obstructive pulmonary disease) (HCC)  Assessment & Plan  Chronic, stable    Patient prescribed Breo Ellipta 1 puff q 12, albuterol p r n , however patient reports that he does not use inhalers at home  On admission no wheezing present, no increased WOB    · Duo nebs PRN    Elevated troponin  Assessment & Plan  Acute, new finding    In ED initial troponin elevated to 97, no ST changes on EKG  Patient denies any CP or SOB  Consider NSTEMI vs type 2 MI    · Trend troponin  · AM EKG  · Telemetry    History of prostate cancer  Assessment & Plan  Chronic, not well followed up    Patient diagnosed with prostate cancer December 2020  Prescribed flomax but reports not taking it at home    · Continue home flomax  · Monitor for urinary retention    Type 2 diabetes mellitus [facility])  Assessment & Plan  Lab Results   Component Value Date    HGBA1C 5 1 04/09/2022     Chronic, stable    At home patient takes metformin 1000 mg b i d  Last A1c 5 3 11/19/21    · Hold metformin in setting of ENMA  · SSI    Essential hypertension  Assessment & Plan  Chronic, stable    · At home patient takes amlodipine 5mg QD, lisinopril 10mg QD    Thrombocytopenia (HCC)  Assessment & Plan  Chronic, intermittent    On admission Plt 80  Likely secondary to alcohol abuse, although no prior workup    · JOSHI-13, PT, PTT  · Hold anticoagulation if Plt < 50    Hx of CABG  Assessment & Plan  Prior surgery, stable    In ED initial troponin elevated to 97, No ST changes on EKG  Patient denies any chest pain      · Tele  · Trend troponins  · Am EKG      IVF:  NS 0 9%, Last Rate: 75 mL/hr (04/11/22 0020)  Electrolytes:  Replete as needed  Diet:  Calorie controlled diet carbohydrate level 2  DVT prophylaxis:  Lovenox 40 mg subQ daily and     ---------------------------------------------------------------------------------------  HPI/24hr events:   No acute events overnight    Code Status: Level 1 - Full Code  ---------------------------------------------------------------------------------------    Subjective:   Patient was seen and examined by bedside today  Patient appears comfortable  No acute complaints  He is able to tolerate food and water  Review of Systems   Constitutional: Negative for chills, diaphoresis, fatigue and fever  HENT: Negative for ear pain, sore throat and trouble swallowing  Eyes: Negative for pain and visual disturbance  Respiratory: Negative for cough, shortness of breath and wheezing  Cardiovascular: Negative for chest pain, palpitations and leg swelling  Gastrointestinal: Negative for abdominal pain, blood in stool, constipation, diarrhea, nausea and vomiting  Genitourinary: Negative for dysuria, hematuria and penile discharge  Musculoskeletal: Negative for arthralgias and back pain  Skin: Negative for color change, pallor and rash  Neurological: Negative for dizziness, tremors, seizures, syncope, weakness, light-headedness and headaches  Psychiatric/Behavioral: Negative for confusion and dysphoric mood  The patient is not nervous/anxious  All other systems reviewed and are negative  Objective:   Temp:  [97 1 °F (36 2 °C)-98 2 °F (36 8 °C)] 98 2 °F (36 8 °C)  HR:  [71-94] 80  Resp:  [18] 18  BP: ()/(54-80) 135/65  SpO2:  [93 %-98 %] 97 %   Intake/Output Summary (Last 24 hours) at 2022 0331  Last data filed at 4/10/2022 2300  Gross per 24 hour   Intake 10 ml   Output 2255 ml   Net -2245 ml        Wt Readings from Last 3 Encounters:   04/10/22 90 3 kg (199 lb 1 2 oz)   22 86 2 kg (190 lb)   02/10/22 86 1 kg (189 lb 12 8 oz)        Physical Exam  Vitals and nursing note reviewed  Constitutional:       Appearance: He is well-developed     HENT:      Head: Normocephalic and atraumatic  Eyes:      Conjunctiva/sclera: Conjunctivae normal    Cardiovascular:      Rate and Rhythm: Normal rate and regular rhythm  Pulses: Normal pulses  Heart sounds: Normal heart sounds  No murmur heard  Pulmonary:      Effort: Pulmonary effort is normal  No respiratory distress  Breath sounds: Normal breath sounds  Abdominal:      General: Bowel sounds are normal       Palpations: Abdomen is soft  Tenderness: There is no abdominal tenderness  Musculoskeletal:      Cervical back: Neck supple  Right lower leg: No edema  Left lower leg: No edema  Skin:     General: Skin is warm and dry  Neurological:      Mental Status: He is alert  Laboratory   Results from last 7 days   Lab Units 04/10/22  0559 04/09/22  2213   WBC Thousand/uL 4 23* 7 75   HEMOGLOBIN g/dL 11 9* 13 4   HEMATOCRIT % 34 7* 39 2   PLATELETS Thousands/uL 61* 80*   NEUTROS PCT %  --  65   MONOS PCT %  --  15*     Results from last 7 days   Lab Units 04/10/22  0559 04/09/22  2213   SODIUM mmol/L 132* 129*   POTASSIUM mmol/L 4 3 5 4*   CHLORIDE mmol/L 96* 91*   CO2 mmol/L 26 26   ANION GAP mmol/L 10 12   BUN mg/dL 32* 35*   CREATININE mg/dL 1 59* 1 61*   CALCIUM mg/dL 8 9 9 2   GLUCOSE RANDOM mg/dL 113 109   ALT U/L 42 53   AST U/L 40 52*   ALK PHOS U/L 79 97   ALBUMIN g/dL 3 2* 3 9   TOTAL BILIRUBIN mg/dL 0 51 0 59     Results from last 7 days   Lab Units 04/10/22  0559 04/09/22  2213   MAGNESIUM mg/dL 1 5* 1 2*      Results from last 7 days   Lab Units 04/10/22  0234   INR  1 00   PTT seconds 28                          Diagnostic Imaging / Data: I have personally reviewed pertinent reports        Medications:    MED PRN IV   amLODIPine, 5 mg, Oral, Daily  aspirin, 81 mg, Oral, Daily  chlordiazePOXIDE, 50 mg, Oral, Q6H [facility]   Followed by  Estelita Horton ON 4/12/2022] chlordiazePOXIDE, 25 mg, Oral, Q4H [facility]   Followed by  Estelita Horton ON 4/13/2022] chlordiazePOXIDE, 25 mg, Oral, Q6H [facility]  cyanocobalamin, 1,000 mcg, Oral, Daily  enoxaparin, 40 mg, Subcutaneous, Daily  folic acid, 7,713 mcg, Oral, Daily  ipratropium-albuterol, 3 mL, Nebulization, Q6H  metoprolol tartrate, 25 mg, Oral, Q12H SEDA  nicotine, 1 patch, Transdermal, Daily  pantoprazole, 40 mg, Oral, Early Morning  pravastatin, 40 mg, Oral, Daily With Dinner  ranolazine, 500 mg, Oral, Q12H [facility]  tamsulosin, 0 4 mg, Oral, Daily  thiamine, 100 mg, Oral, Daily       sodium chloride, Last Rate: 75 mL/hr (04/11/22 0020)           Invasive Devices  Report    Peripheral Intravenous Line            Peripheral IV 03/11/22 Distal;Right;Upper;Ventral (anterior) Arm 30 days    Peripheral IV 04/09/22 Left Wrist 1 day                  Jaylene Mcmillan MD    Portions of the record may have been created with voice recognition software  Occasional wrong word or "sound a like" substitutions may have occurred due to the inherent limitations of voice recognition software  Read the chart carefully and recognize, using context, where substitutions have occurred      Plan was discussed and in agreement with patient

## 2022-04-11 NOTE — OCCUPATIONAL THERAPY NOTE
Occupational Therapy Screen       04/11/22 4253   Note Type   Note type Screen   Additional Comments Per chart review patient is currently independent in all ADLs and mobility and is being discharged today   NO skilled inpatient OT services indicated at this time   [employer] Insurance Number  Sophia Fan, OTR/L [id number]

## 2022-04-11 NOTE — NURSING NOTE
All belongings with patient  Retrieved medications from the pharmacy and gave him his lighter back  All instructions given to patient  Nicotine patches were sent to [address] [place] [address]

## 2022-04-12 ENCOUNTER — TRANSITIONAL CARE MANAGEMENT (OUTPATIENT)
Dept: FAMILY MEDICINE CLINIC | Facility: CLINIC | Age: 62
End: 2022-04-12

## 2022-04-12 LAB — ACTH PLAS-MCNC: 7.8 PG/ML (ref 7.2–63.3)

## 2022-04-12 NOTE — ED PROVIDER NOTES
History  Chief Complaint   Patient presents with    Detox Evaluation     here for alcohol detox, discharged from here today  states needs rx for Librium  for some reason they only gave him an rx for nicotine patch but thats all  states he has not had a drink since he left    Leg Pain     states upon discharge never got any referral for therapy for his leg and they said they would     82-year-old male presents as alcohol detox was admitted and discharged from the floor today  Says no one called in a prescription for librium and also needs outpatient referral to PT/OT  No other medical complaints, no psychiatric complaints  History provided by:  Patient   used: No        Prior to Admission Medications   Prescriptions Last Dose Informant Patient Reported? Taking? Blood Glucose Monitoring Suppl (ONE TOUCH ULTRA MINI) w/Device KIT  Self Yes No   Sig: Use as directed   Breo Ellipta 200-25 MCG/INH inhaler   No No   Sig: INHALE 1 PUFF BY INHALATION ROUTE EVERY 12 HOURS   RINSE OUT YOUR MOUTH AFTER EACH USE   ONETOUCH DELICA LANCETS FINE MISC  Self Yes No   Sig: 3 (three) times a day Test   Ventolin  (90 Base) MCG/ACT inhaler   No No   Sig: INHALE 2 PUFFS EVERY 4 HOURS AS NEEDED FOR WHEEZING   amLODIPine (NORVASC) 5 mg tablet   No No   Sig: TAKE 1 TABLET BY MOUTH EVERY DAY   chlordiazePOXIDE (LIBRIUM) 25 mg capsule   No No   Sig: Take 1 capsule (25 mg total) by mouth 3 (three) times a day as needed for anxiety for up to 10 days CANNOT BE USED WITH ALCOHOL   folic acid (FOLVITE) 1 mg tablet   No No   Sig: TAKE 1 TABLET BY MOUTH EVERY DAY   gabapentin (NEURONTIN) 300 mg capsule   No No   Sig: TAKE 1 CAPSULE BY MOUTH 3 TIMES A DAY   lisinopril (ZESTRIL) 10 mg tablet   No No   Sig: TAKE 1 TABLET BY MOUTH EVERY DAY   magnesium Oxide (MAG-OX) 400 mg TABS   Yes No   Sig: Take 400 mg by mouth 2 (two) times a day   metFORMIN (GLUCOPHAGE) 1000 MG tablet   No No   Sig: TAKE 1 TABLET BY MOUTH EVERY 12 HOURS   metoprolol tartrate (LOPRESSOR) 25 mg tablet   No No   Sig: TAKE 1 TABLET BY MOUTH EVERY 12 HOURS   nicotine (NICODERM CQ) 21 mg/24 hr TD 24 hr patch   No No   Sig: Place 1 patch on the skin daily   omeprazole (PriLOSEC) 20 mg delayed release capsule   No No   Sig: Take 1 capsule (20 mg total) by mouth daily   pravastatin (PRAVACHOL) 40 mg tablet   No No   Sig: TAKE 1 TABLET BY MOUTH EVERY DAY WITH DINNER   ranolazine (RANEXA) 500 mg 12 hr tablet   No No   Sig: TAKE 1 TABLET BY MOUTH EVERY 12 HOURS   tamsulosin (FLOMAX) 0 4 mg   Yes No   Sig: Take 0 4 mg by mouth daily   thiamine 100 MG tablet   No No   Sig: TAKE 1 TABLET BY MOUTH EVERY DAY      Facility-Administered Medications: None       Past Medical History:   Diagnosis Date    Cancer [facility])     prostate ca,had radiation    Cardiac disease     stents,then triple bypass    COPD [facility]chronic obstructive pulmonary disease) ([facility] [address] )     [facility]     ETOH abuse     Hx of heart artery stent     2014    Hyperlipidemia     Hypertension     Hypovolemic shock ([facility] ) 12/22/2019    Pneumonia     Prostate CA ([facility] [address] )     Renal disorder     pyelonephritis    S/P CABG x 1     2004       Past Surgical History:   Procedure Laterality Date    CORONARY ARTERY BYPASS GRAFT  2004    DC ARTHRODESIS ANT INTERBODY MIN DISCECTOMY, CERVICAL BELOW C2 N/A 12/16/2020    Procedure: Anterior cervical discectomy with fusion C4-C7; Posterior cervical decompression and fusion C2-T2;  Surgeon: Dusty Hirsch MD;  Location: [facility];  Service: Neurosurgery    TONSILLECTOMY         Family History   Problem Relation Age of Onset    Diabetes Mother     Uterine cancer Mother     COPD Father     Hypertension Father      I have reviewed and agree with the history as documented      E-Cigarette/Vaping    E-Cigarette Use Never User      E-Cigarette/Vaping Substances    Nicotine Yes     THC No     CBD No     Flavoring No     Other No     Unknown No      Social History Tobacco Use    Smoking status: Current Every Day Smoker     Packs/day: 1 50     Years: 40 00     Pack years: 60 00     Types: Cigarettes    Smokeless tobacco: Never Used   Vaping Use    Vaping Use: Never used   Substance Use Topics    Alcohol use: Yes     Alcohol/week: 10 0 standard drinks     Types: 10 Shots of liquor per week     Comment: last drink friday    Drug use: No       Review of Systems   Constitutional: Negative  HENT: Negative  Eyes: Negative  Respiratory: Negative  Cardiovascular: Negative  Gastrointestinal: Negative  Endocrine: Negative  Genitourinary: Negative  Musculoskeletal: Negative  Skin: Negative  Allergic/Immunologic: Negative  Neurological: Negative  Hematological: Negative  Psychiatric/Behavioral: Negative  All other systems reviewed and are negative  Physical Exam  Physical Exam  Constitutional:       Appearance: Normal appearance  HENT:      Head: Normocephalic and atraumatic  Nose: Nose normal       Mouth/Throat:      Mouth: Mucous membranes are moist    Eyes:      Extraocular Movements: Extraocular movements intact  Pupils: Pupils are equal, round, and reactive to light  Cardiovascular:      Rate and Rhythm: Normal rate and regular rhythm  Pulmonary:      Effort: Pulmonary effort is normal       Breath sounds: Normal breath sounds  Abdominal:      General: Abdomen is flat  Bowel sounds are normal       Palpations: Abdomen is soft  Musculoskeletal:         General: Normal range of motion  Cervical back: Normal range of motion and neck supple  Skin:     General: Skin is warm  Capillary Refill: Capillary refill takes less than 2 seconds  Neurological:      General: No focal deficit present  Mental Status: He is alert and oriented to person, place, and time  Mental status is at baseline  Psychiatric:         Mood and Affect: Mood normal          Thought Content:  Thought content normal  Vital Signs  ED Triage Vitals [04/11/22 1808]   Temperature Pulse Respirations Blood Pressure SpO2   97 7 °F (36 5 °C) 87 20 117/65 98 %      Temp Source Heart Rate Source Patient Position - Orthostatic VS BP Location FiO2 (%)   Tympanic Monitor Sitting Right arm --      Pain Score       3           Vitals:    04/11/22 1808 04/11/22 2008   BP: 117/65 123/72   Pulse: 87 86   Patient Position - Orthostatic VS: Sitting Sitting         Visual Acuity      ED Medications  Medications - No data to display    Diagnostic Studies  Results Reviewed     None                 No orders to display              Procedures  Procedures         ED Course                                             MDM  Number of Diagnoses or Management Options  Patient Progress  Patient progress: stable      Disposition  Final diagnoses:   Alcohol abuse     Time reflects when diagnosis was documented in both MDM as applicable and the Disposition within this note     Time User Action Codes Description Comment    4/11/2022  8:17 PM González Madden Add [F10 10] Alcohol abuse       ED Disposition     ED Disposition Condition Date/Time Comment    Discharge Stable Mon Apr 11, 2022  8:17 PM Hector Wilson discharge to home/self care  Follow-up Information     Follow up With Specialties Details Why Contact Info Additional Information    [facility] Emergency Department Emergency Medicine  If symptoms worsen [address] [facility] [address]  [address] [address] Emergency Department, [facility], [address] [place] [address], [facility]          Patient's Medications   Discharge Prescriptions    CHLORDIAZEPOXIDE (LIBRIUM) 25 MG CAPSULE    Take 1 capsule (25 mg total) by mouth 3 (three) times a day as needed for anxiety for up to 7 days       Start Date: 4/11/2022 End Date: 4/18/2022       Order Dose: 25 mg       Quantity: 21 capsule    Refills: 0       No discharge procedures on file      PDMP Review Value Time User    PDMP Reviewed  Yes 7/26/2021  4:50 PM Crystal Wise MD          ED Provider  Electronically Signed by           Renetta Montgomery DO  04/15/22 1952

## 2022-04-13 ENCOUNTER — PATIENT OUTREACH (OUTPATIENT)
Dept: FAMILY MEDICINE CLINIC | Facility: CLINIC | Age: 62
End: 2022-04-13

## 2022-04-13 RX ORDER — CHLORDIAZEPOXIDE HYDROCHLORIDE 25 MG/1
25 CAPSULE, GELATIN COATED ORAL 3 TIMES DAILY PRN
Qty: 9 CAPSULE | Refills: 0 | OUTPATIENT
Start: 2022-04-13 | End: 2022-04-16

## 2022-04-13 NOTE — TELEPHONE ENCOUNTER
Yes, I am unable to send this because it is a controlled substance   I have forwarded this message to Dr Vijay Rodriguez

## 2022-04-13 NOTE — TELEPHONE ENCOUNTER
Patient seemed confused about why he needed an appointment and became frustrated and call was transferred to [place] [id number] per patient's request

## 2022-04-13 NOTE — TELEPHONE ENCOUNTER
Medication was already sent and patient presented to the ED for a paper script   Please schedule patient for a visit with PCP for refills or transition to Naltrexone

## 2022-04-13 NOTE — PROGRESS NOTES
Returned patients phone call  LVM advising that librium was ordered  Advised not to be consuming alcohol while taking librium  Requested return call to discuss detox options and programs  CM contact information provided

## 2022-04-13 NOTE — TELEPHONE ENCOUNTER
Medication was not e-scribed and needs to be re-sent  Pended and changed to "normal" so that it will e-scribe

## 2022-04-13 NOTE — TELEPHONE ENCOUNTER
HI Dr Lenka Ribera,    Will you please refill Mr Moustapha Roy? He has an upcoming appointment with Dr Marielos Gunderson but I don't want him to be out       Rulon Meuse

## 2022-04-14 ENCOUNTER — TELEPHONE (OUTPATIENT)
Dept: HEMATOLOGY ONCOLOGY | Facility: CLINIC | Age: 62
End: 2022-04-14

## 2022-04-14 ENCOUNTER — PATIENT OUTREACH (OUTPATIENT)
Dept: FAMILY MEDICINE CLINIC | Facility: CLINIC | Age: 62
End: 2022-04-14

## 2022-04-14 NOTE — PROGRESS NOTES
Received call from patient  Explained to patient that his librium was called into [facility] pharmacy on [address]  He states he has been trying to reach them but they have not returned his calls  Pt then goes on to state he does not want "Lenny Flash" anywhere near his chart or to see him when he is in the hospital  Chart review done  CM could not explain who Nadean Flash was  Pt states he was not intoxicated when he went back to the ED  Pt states he is working with Marbella Douglass from [address] [place] [address] at [phone] ext  [phone]  Attempted to call Marbella Douglass together through conference call  Left message  Suggested to Mendeley that he sign a release to allow [address] [place] [address] counselors the ability to speak on his behalf if the need arises  States he will  Call disconnected  Called patient back and left voice message  CM will meet with patient at next office visit

## 2022-04-15 ENCOUNTER — TELEPHONE (OUTPATIENT)
Dept: FAMILY MEDICINE CLINIC | Facility: CLINIC | Age: 62
End: 2022-04-15

## 2022-04-15 DIAGNOSIS — F10.10 ALCOHOL ABUSE: ICD-10-CM

## 2022-04-15 PROBLEM — K70.0 FATTY LIVER, ALCOHOLIC: Status: ACTIVE | Noted: 2022-04-15

## 2022-04-15 LAB — VWF CP ACT/NOR PPP CHRO: 41.1 %

## 2022-04-15 NOTE — TELEPHONE ENCOUNTER
Patient called regarding Librium Rx  Says [facility] did not receive  No e-scribe confirmation in chart  Clinical- can we please have this re-sent to [facility]?

## 2022-04-19 DIAGNOSIS — F10.10 ALCOHOL ABUSE: Chronic | ICD-10-CM

## 2022-04-19 DIAGNOSIS — E11.00 TYPE 2 DIABETES MELLITUS WITH HYPEROSMOLARITY WITHOUT COMA, WITHOUT LONG-TERM CURRENT USE OF INSULIN (HCC): Chronic | ICD-10-CM

## 2022-04-19 NOTE — QUICK NOTE
Addendum to previous progress note:    Elevated troponin likely represents type 2 MI  NTEMI ruled out given normal ECG  Patient does not complain of any chest pain  --  Tee Saeed, DO    "Some portions of this record may have been generated with voice recognition software  There may be translation, syntax, or grammatical errors  Occasional wrong word or "sound-a-like" substitutions may have occurred due to the inherent limitations of the voice recognition software  Read the chart carefully and recognize, using context, where substations may have occurred   If you have any questions, please contact the dictating provider for clarification or correction, as needed "

## 2022-04-19 NOTE — TELEPHONE ENCOUNTER
Dr Aziza Gaines    Patient need refills for   Metformin 1000 mg tablet (90 days refills)  Thiamine 100 mg tablet   (90 days refills)     [facility]

## 2022-04-19 NOTE — TELEPHONE ENCOUNTER
Pt called today  stating he still has not received his Rx for Librium 25mg  Originally meant to be dispensed   Pt called office on 4/15 and stated same issue  [address] called and stated they have not received medication order  This medication cannot be delegated  Medication order in chart is   Please write new script if appropriate

## 2022-04-20 ENCOUNTER — TELEPHONE (OUTPATIENT)
Dept: FAMILY MEDICINE CLINIC | Facility: CLINIC | Age: 62
End: 2022-04-20

## 2022-04-20 RX ORDER — LANOLIN ALCOHOL/MO/W.PET/CERES
100 CREAM (GRAM) TOPICAL DAILY
Qty: 90 TABLET | Refills: 0 | Status: SHIPPED | OUTPATIENT
Start: 2022-04-20 | End: 2022-05-27 | Stop reason: SDUPTHER

## 2022-04-20 NOTE — TELEPHONE ENCOUNTER
Patient is calling again for his prescriptions  Patient is out of his meds, he is also requesting librium which I did not see on his med list    Patient is getting very upset that this has not been done     Please advise

## 2022-04-20 NOTE — TELEPHONE ENCOUNTER
Please remind patient of policy for the time needed for medication refill requests  Also, please inform patient that Librium is not a long solution for alcohol usage  Will need to consider Revia on the next visit

## 2022-04-20 NOTE — TELEPHONE ENCOUNTER
Dr Baron Hein     Please if you be so kind  Do the refill for this patient he is out of medication  And been trying to get the refills but the pcp is out  Let me know if you have any question     Patient need refills for   Metformin 1000 mg tablet (90 days refills)  Thiamine 100 mg tablet   (90 days refills)      [facility]

## 2022-04-21 RX ORDER — CHLORDIAZEPOXIDE HYDROCHLORIDE 25 MG/1
25 CAPSULE, GELATIN COATED ORAL 3 TIMES DAILY PRN
Qty: 9 CAPSULE | Refills: 0
Start: 2022-04-21 | End: 2022-04-24

## 2022-04-22 NOTE — PHYSICIAN ADVISOR
Current patient class: Inpatient  The patient is currently on Hospital Day: 3 at [place] [id number]      The patient was admitted to the hospital at 12:09 AM on 4/10/22 for the following diagnosis:  Alcohol withdrawal ([facility] ) [F10 239]  Hyponatremia [E87 1]  Shaking [R25 1]  Weakness [R53 1]  ENMA (acute kidney injury) ([facility] ) [N17 9]       There was documentation in the medical record of an expected length of stay of at least 2 midnights  The patient was therefore expected to satisfy the 2 midnight benchmark and given the 2 midnight presumption was appropriate for INPATIENT ADMISSION  Given this expectation of a satisfying stay, CMS instructs us that the patient is most often appropriate for inpatient admission under part A provided medical necessity is documented in the chart  After review of the relevant documentation, labs, vital signs and test results, the patient is appropriate for INPATIENT ADMISSION  Admission to the hospital as an inpatient is a complex decision making process which requires the practitioner to consider the patients presenting complaint, history and physical examination and all relevant testing  With this in mind, in this case, the patient was deemed appropriate for INPATIENT ADMISSION  After review of the documentation and testing available at the time of the admission, I concur with this clinical determination of medical necessity  For the reason noted, the patient was discharged before reaching 2 midnights as an inpatient  Rationale is as follows: The patient is a 63 yrs old Male who presented to the ED at 4/9/2022  9:50 PM with a chief complaint of Shaking (Patient states "I have been shaky all day and just feel really off " Also c/o generalized weakness  Hx of diabetes with no recent blood sugar checks) and Weakness - Generalized    The patient has a history of alcohol abuse and was also found to be in acute renal failure with electrolyte abnormalities which were being monitored  The patient was put on Librium and CIWA protocol as well  Given the above the patient did cross the 2 midnight benchmark as set by Medicare is inpatient status appropriate  On day 2 admission the patient was maintained on IV fluids and electrolytes were being actively monitored  The patients vitals on arrival were   ED Triage Vitals [04/09/22 2153]   Temperature Pulse Respirations Blood Pressure SpO2   97 5 °F (36 4 °C) (!) 108 20 168/85 95 %      Temp Source Heart Rate Source Patient Position - Orthostatic VS BP Location FiO2 (%)   Temporal Monitor Lying Right arm --      Pain Score       No Pain           Past Medical History:   Diagnosis Date    Cancer [facility])     prostate ca,had radiation    Cardiac disease     stents,then triple bypass    COPD (chronic obstructive pulmonary disease) ([facility] )     [facility]     ETOH abuse     Hx of heart artery stent     2014    Hyperlipidemia     Hypertension     Hypovolemic shock ([facility] ) 12/22/2019    Pneumonia     Prostate CA ([facility] )     Renal disorder     pyelonephritis    S/P CABG x 1     2004     Past Surgical History:   Procedure Laterality Date    CORONARY ARTERY BYPASS GRAFT  2004    [place] ARTHRODESIS ANT INTERBODY MIN DISCECTOMY, CERVICAL BELOW C2 N/A 12/16/2020    Procedure: Anterior cervical discectomy with fusion C4-C7;  Posterior cervical decompression and fusion C2-T2;  Surgeon: Fayrene Seip, MD;  Location: [facility]IN OR;  Service: Neurosurgery    TONSILLECTOMY             Consults have been placed to:   IP CONSULT TO ALCOHOL BRIEF INTERVENTION TRAUMA    Vitals:    04/11/22 0533 04/11/22 0729 04/11/22 0800 04/11/22 1217   BP:  118/74     BP Location:  Right arm     Pulse:  67  85   Resp:  18     Temp:  97 8 °F (36 6 °C)     TempSrc:  Oral     SpO2:  94% 96% 97%   Weight: 87 8 kg (193 lb 9 oz)      Height:           Most recent labs:    No results for input(s): WBC, HGB, HCT, PLT, K, NA, CALCIUM, BUN, CREATININE, LIPASE, AMYLASE, INR, TROPONINI, CKTOTAL, AST, ALT, ALKPHOS, BILITOT in the last 72 hours  Scheduled Meds:  Continuous Infusions:No current facility-administered medications for this encounter  PRN Meds:      Surgical procedures (if appropriate):

## 2022-04-26 DIAGNOSIS — I10 ESSENTIAL (PRIMARY) HYPERTENSION: Chronic | ICD-10-CM

## 2022-04-26 DIAGNOSIS — J44.9 COPD (CHRONIC OBSTRUCTIVE PULMONARY DISEASE) (HCC): ICD-10-CM

## 2022-04-27 RX ORDER — LISINOPRIL 10 MG/1
10 TABLET ORAL DAILY
Qty: 90 TABLET | Refills: 0 | Status: SHIPPED | OUTPATIENT
Start: 2022-04-27 | End: 2022-06-06 | Stop reason: SDUPTHER

## 2022-04-29 ENCOUNTER — TELEPHONE (OUTPATIENT)
Dept: FAMILY MEDICINE CLINIC | Facility: CLINIC | Age: 62
End: 2022-04-29

## 2022-04-29 ENCOUNTER — TELEPHONE (OUTPATIENT)
Dept: HEMATOLOGY ONCOLOGY | Facility: CLINIC | Age: 62
End: 2022-04-29

## 2022-04-29 NOTE — TELEPHONE ENCOUNTER
PT HAS APPT SCHEDULED 5/5 at 11am    Pt called and stated he has prostate cancer  He has had it previously and is sure he has it again  However he had an appt scheduled for oncology (Dr Moncho Killian) which was cancelled, due to lack of biopsy or imaging to indicate diagnosis   Pt will need a diagnosis attached to referral to be able to see that doctor

## 2022-04-29 NOTE — TELEPHONE ENCOUNTER
Patient is calling in requesting to make an appointment with Hem Onc but after further looking into patient's chart there is no biopsy nor imaging that indicates the diagnosis that was placed on the referral  Patient has had imaging done and it does not indicate anything pertaining to diagnosis  I have called patient to cancel appointment I made with Gus Carter and have informed him to please call his PCP

## 2022-05-25 DIAGNOSIS — F10.10 ALCOHOL ABUSE: Chronic | ICD-10-CM

## 2022-05-25 DIAGNOSIS — I10 ESSENTIAL (PRIMARY) HYPERTENSION: Chronic | ICD-10-CM

## 2022-05-25 RX ORDER — FOLIC ACID 1 MG/1
TABLET ORAL
Qty: 30 TABLET | Refills: 0 | OUTPATIENT
Start: 2022-05-25

## 2022-05-25 NOTE — TELEPHONE ENCOUNTER
Patient has an appointment scheduled for 6/9/22   Patient would like a refilled supplied until his appointment date

## 2022-05-26 RX ORDER — RANOLAZINE 500 MG/1
500 TABLET, EXTENDED RELEASE ORAL EVERY 12 HOURS
Qty: 60 TABLET | Refills: 3 | Status: SHIPPED | OUTPATIENT
Start: 2022-05-26 | End: 2022-06-06 | Stop reason: SDUPTHER

## 2022-05-27 DIAGNOSIS — F10.10 ALCOHOL ABUSE: Chronic | ICD-10-CM

## 2022-05-27 RX ORDER — LANOLIN ALCOHOL/MO/W.PET/CERES
100 CREAM (GRAM) TOPICAL DAILY
Qty: 90 TABLET | Refills: 0 | Status: SHIPPED | OUTPATIENT
Start: 2022-05-27 | End: 2022-06-06 | Stop reason: SDUPTHER

## 2022-06-01 ENCOUNTER — TELEPHONE (OUTPATIENT)
Dept: FAMILY MEDICINE CLINIC | Facility: CLINIC | Age: 62
End: 2022-06-01

## 2022-06-01 NOTE — TELEPHONE ENCOUNTER
Patient is requesting Rx for Thiamine 100mg be sent to [facility], looks like this was sent to [facility] in error

## 2022-06-06 DIAGNOSIS — J44.9 COPD (CHRONIC OBSTRUCTIVE PULMONARY DISEASE) (HCC): ICD-10-CM

## 2022-06-06 DIAGNOSIS — G95.19 EDEMA, SPINAL CORD (HCC): ICD-10-CM

## 2022-06-06 DIAGNOSIS — I10 ESSENTIAL (PRIMARY) HYPERTENSION: Chronic | ICD-10-CM

## 2022-06-06 DIAGNOSIS — F10.10 ALCOHOL ABUSE: Chronic | ICD-10-CM

## 2022-06-07 RX ORDER — LANOLIN ALCOHOL/MO/W.PET/CERES
100 CREAM (GRAM) TOPICAL DAILY
Qty: 90 TABLET | Refills: 0 | Status: SHIPPED | OUTPATIENT
Start: 2022-06-07

## 2022-06-07 RX ORDER — RANOLAZINE 500 MG/1
500 TABLET, EXTENDED RELEASE ORAL EVERY 12 HOURS
Qty: 60 TABLET | Refills: 3 | Status: SHIPPED | OUTPATIENT
Start: 2022-06-07 | End: 2022-06-09 | Stop reason: SDUPTHER

## 2022-06-07 RX ORDER — LISINOPRIL 10 MG/1
10 TABLET ORAL DAILY
Qty: 90 TABLET | Refills: 0 | Status: SHIPPED | OUTPATIENT
Start: 2022-06-07 | End: 2022-06-09

## 2022-06-07 RX ORDER — GABAPENTIN 300 MG/1
CAPSULE ORAL
Qty: 90 CAPSULE | Refills: 1 | Status: SHIPPED | OUTPATIENT
Start: 2022-06-07

## 2022-06-07 RX ORDER — ALBUTEROL SULFATE 90 UG/1
2 AEROSOL, METERED RESPIRATORY (INHALATION) EVERY 4 HOURS PRN
Qty: 18 G | Refills: 0 | Status: SHIPPED | OUTPATIENT
Start: 2022-06-07

## 2022-06-07 NOTE — TELEPHONE ENCOUNTER
Needs visit to f/u chronic conditions (LONG)  Please schedule, thank you  Will refill medications for now

## 2022-06-09 ENCOUNTER — OFFICE VISIT (OUTPATIENT)
Dept: FAMILY MEDICINE CLINIC | Facility: CLINIC | Age: 62
End: 2022-06-09
Payer: MEDICARE

## 2022-06-09 VITALS
OXYGEN SATURATION: 97 % | SYSTOLIC BLOOD PRESSURE: 96 MMHG | DIASTOLIC BLOOD PRESSURE: 44 MMHG | WEIGHT: 191.8 LBS | BODY MASS INDEX: 25.98 KG/M2 | HEIGHT: 72 IN | RESPIRATION RATE: 18 BRPM | TEMPERATURE: 97.9 F | HEART RATE: 84 BPM

## 2022-06-09 DIAGNOSIS — I10 ESSENTIAL (PRIMARY) HYPERTENSION: Chronic | ICD-10-CM

## 2022-06-09 DIAGNOSIS — F10.10 ALCOHOL ABUSE: Chronic | ICD-10-CM

## 2022-06-09 DIAGNOSIS — G95.19 EDEMA, SPINAL CORD (HCC): ICD-10-CM

## 2022-06-09 DIAGNOSIS — E63.8 IMBALANCED NUTRITION: ICD-10-CM

## 2022-06-09 DIAGNOSIS — F19.10 SUBSTANCE ABUSE (HCC): ICD-10-CM

## 2022-06-09 DIAGNOSIS — G47.30 SLEEP APNEA, UNSPECIFIED TYPE: ICD-10-CM

## 2022-06-09 DIAGNOSIS — K21.9 GASTROESOPHAGEAL REFLUX DISEASE, UNSPECIFIED WHETHER ESOPHAGITIS PRESENT: ICD-10-CM

## 2022-06-09 DIAGNOSIS — Z13.89 SCREENING FOR SUBSTANCE ABUSE: ICD-10-CM

## 2022-06-09 DIAGNOSIS — C61 PROSTATE CANCER (HCC): Primary | ICD-10-CM

## 2022-06-09 DIAGNOSIS — E78.5 HYPERLIPIDEMIA, UNSPECIFIED HYPERLIPIDEMIA TYPE: ICD-10-CM

## 2022-06-09 PROBLEM — F10.930 ALCOHOL WITHDRAWAL SYNDROME WITHOUT COMPLICATION (HCC): Status: RESOLVED | Noted: 2018-10-09 | Resolved: 2022-06-09

## 2022-06-09 PROBLEM — F10.230 ALCOHOL WITHDRAWAL SYNDROME WITHOUT COMPLICATION (HCC): Status: RESOLVED | Noted: 2018-10-09 | Resolved: 2022-06-09

## 2022-06-09 PROBLEM — N18.30 STAGE 3 CHRONIC KIDNEY DISEASE (HCC): Status: ACTIVE | Noted: 2022-06-09

## 2022-06-09 PROCEDURE — 99214 OFFICE O/P EST MOD 30 MIN: CPT | Performed by: FAMILY MEDICINE

## 2022-06-09 RX ORDER — LISINOPRIL 10 MG/1
5 TABLET ORAL DAILY
Qty: 90 TABLET | Refills: 0 | Status: SHIPPED | OUTPATIENT
Start: 2022-06-09 | End: 2022-06-27 | Stop reason: SDUPTHER

## 2022-06-09 RX ORDER — FERROUS SULFATE TAB EC 324 MG (65 MG FE EQUIVALENT) 324 (65 FE) MG
324 TABLET DELAYED RESPONSE ORAL
Qty: 60 TABLET | Refills: 2 | Status: SHIPPED | OUTPATIENT
Start: 2022-06-09

## 2022-06-09 RX ORDER — RANOLAZINE 500 MG/1
500 TABLET, EXTENDED RELEASE ORAL EVERY 12 HOURS
Qty: 60 TABLET | Refills: 3 | Status: SHIPPED | OUTPATIENT
Start: 2022-06-09 | End: 2022-06-27 | Stop reason: SDUPTHER

## 2022-06-09 RX ORDER — OMEPRAZOLE 20 MG/1
20 CAPSULE, DELAYED RELEASE ORAL DAILY
Qty: 90 CAPSULE | Refills: 3 | Status: SHIPPED | OUTPATIENT
Start: 2022-06-09

## 2022-06-09 RX ORDER — PRAVASTATIN SODIUM 40 MG
40 TABLET ORAL
Qty: 90 TABLET | Refills: 2 | Status: SHIPPED | OUTPATIENT
Start: 2022-06-09

## 2022-06-09 RX ORDER — FOLIC ACID 1 MG/1
1000 TABLET ORAL DAILY
Qty: 90 TABLET | Refills: 2 | Status: SHIPPED | OUTPATIENT
Start: 2022-06-09

## 2022-06-09 RX ORDER — TAMSULOSIN HYDROCHLORIDE 0.4 MG/1
0.4 CAPSULE ORAL DAILY
Qty: 90 CAPSULE | Refills: 1 | Status: SHIPPED | OUTPATIENT
Start: 2022-06-09 | End: 2022-06-27 | Stop reason: SDUPTHER

## 2022-06-09 RX ORDER — LANOLIN ALCOHOL/MO/W.PET/CERES
400 CREAM (GRAM) TOPICAL 2 TIMES DAILY
Qty: 120 TABLET | Refills: 2 | Status: SHIPPED | OUTPATIENT
Start: 2022-06-09

## 2022-06-09 NOTE — PROGRESS NOTES
Assessment/Plan:      Diagnoses and all orders for this visit:    Prostate cancer ([facility] [address] )  -     tamsulosin (FLOMAX) 0 4 mg; Take 1 capsule (0 4 mg total) by mouth daily  -     PSA, total and free; Future    Essential (primary) hypertension  -    reported some symptomatic hypotension  Medication adjusted to doses below:  -    lisinopril (ZESTRIL) 10 mg tablet; Take 0 5 tablets (5 mg total) by mouth daily  -     metoprolol tartrate (LOPRESSOR) 25 mg tablet; Take 0 5 tablets (12 5 mg total) by mouth every 12 (twelve) hours  -     pravastatin (PRAVACHOL) 40 mg tablet; Take 1 tablet (40 mg total) by mouth daily with dinner  -     ranolazine (RANEXA) 500 mg 12 hr tablet; Take 1 tablet (500 mg total) by mouth every 12 (twelve) hours    Alcohol abuse  -     folic acid (FOLVITE) 1 mg tablet; Take 1 tablet (1,000 mcg total) by mouth daily  -     magnesium Oxide (MAG-OX) 400 mg TABS; Take 1 tablet (400 mg total) by mouth 2 (two) times a day  -     ferrous sulfate 324 (65 Fe) mg; Take 1 tablet (324 mg total) by mouth 2 (two) times a day before meals  -     Ambulatory Referral to Gastroenterology; Future    Gastroesophageal reflux disease, unspecified whether esophagitis present  -     omeprazole (PriLOSEC) 20 mg delayed release capsule; Take 1 capsule (20 mg total) by mouth daily    Screening for substance abuse  -    states he had testing positive for opioids on outside lab  -    printed recent results from his medical record that showed negative test   -    if patient requires new drug test would be willing to write additional order  Patient is not currently on any medication that should cause false positive to the best my knowledge  The patient does not report eating a diet heavy in Poppy seeds or other foods that may cause false positive  Hyperlipidemia, unspecified hyperlipidemia type  -     Lipid panel;  Future    Sleep Apnea  - Needs appt late in the afternoon due to sleep apnea  - need to discuss new machine at follow up appointment for blood pressure check          Subjective:     Patient ID: Serenity Ramsay is a 63 y o  male  HPI   Presented with  from [facility]  Patient reports some symptomatic hypotension lightheaded dizziness etc   Patient 6 ft tall and almost 200 lb and found to have systolic pressure of 96  Review of Systems   Constitutional: Negative for chills and fever  HENT: Negative for ear pain and sore throat  Eyes: Negative for pain and visual disturbance  Respiratory: Negative for cough and shortness of breath  Cardiovascular: Negative for chest pain and palpitations  Gastrointestinal: Negative for abdominal pain and vomiting  Genitourinary: Negative for dysuria and hematuria  Musculoskeletal: Negative for arthralgias and back pain  Skin: Negative for color change and rash  Neurological: Positive for light-headedness  Negative for seizures and syncope  All other systems reviewed and are negative  Objective:     Physical Exam  HENT:      Head: Normocephalic and atraumatic  Eyes:      Pupils: Pupils are equal, round, and reactive to light  Cardiovascular:      Rate and Rhythm: Normal rate and regular rhythm  Pulmonary:      Effort: Pulmonary effort is normal    Musculoskeletal:         General: No deformity  Skin:     General: Skin is warm  Neurological:      Mental Status: He is alert and oriented to person, place, and time     Psychiatric:         Mood and Affect: Mood normal          Behavior: Behavior normal

## 2022-06-10 ENCOUNTER — TELEPHONE (OUTPATIENT)
Dept: FAMILY MEDICINE CLINIC | Facility: CLINIC | Age: 62
End: 2022-06-10

## 2022-06-10 ENCOUNTER — APPOINTMENT (OUTPATIENT)
Dept: LAB | Facility: HOSPITAL | Age: 62
End: 2022-06-10
Payer: MEDICARE

## 2022-06-10 DIAGNOSIS — C61 PROSTATE CANCER (HCC): ICD-10-CM

## 2022-06-10 DIAGNOSIS — E78.5 HYPERLIPIDEMIA, UNSPECIFIED HYPERLIPIDEMIA TYPE: ICD-10-CM

## 2022-06-10 LAB
CHOLEST SERPL-MCNC: 193 MG/DL
HDLC SERPL-MCNC: 83 MG/DL
LDLC SERPL CALC-MCNC: 100 MG/DL (ref 0–100)
NONHDLC SERPL-MCNC: 110 MG/DL
TRIGL SERPL-MCNC: 51 MG/DL

## 2022-06-10 PROCEDURE — 84153 ASSAY OF PSA TOTAL: CPT

## 2022-06-10 PROCEDURE — 80061 LIPID PANEL: CPT

## 2022-06-10 PROCEDURE — 36415 COLL VENOUS BLD VENIPUNCTURE: CPT

## 2022-06-10 PROCEDURE — 84154 ASSAY OF PSA FREE: CPT

## 2022-06-10 PROCEDURE — 80307 DRUG TEST PRSMV CHEM ANLYZR: CPT

## 2022-06-10 NOTE — TELEPHONE ENCOUNTER
Call received from Haley at lab, patient is there for prolab drug test ordered and needs clarification  Per Dr Rosa Lxu patient needs urine tox screen particularly looking for any opioids

## 2022-06-14 LAB
PSA FREE MFR SERPL: 22 %
PSA FREE SERPL-MCNC: 0.11 NG/ML
PSA SERPL-MCNC: 0.5 NG/ML (ref 0–4)

## 2022-06-15 ENCOUNTER — TELEPHONE (OUTPATIENT)
Dept: FAMILY MEDICINE CLINIC | Facility: CLINIC | Age: 62
End: 2022-06-15

## 2022-06-15 NOTE — TELEPHONE ENCOUNTER
Attempted contacting patient to schedule an appointment reached VM, left message asking patient to call the office

## 2022-06-15 NOTE — TELEPHONE ENCOUNTER
Patient is requested Albuterol solution so the he will be able to do neb treatments   I do not see on current med list

## 2022-06-15 NOTE — TELEPHONE ENCOUNTER
Hi-  No, he didn't report any symptoms, he did mention he may have gotten from hospital during a ED visit in the past  I can reach out to him and schedule and appointment,

## 2022-06-15 NOTE — TELEPHONE ENCOUNTER
Jono Gomez, I don't see it either   he would have to get it from his prescribing provider  Did he report any symptoms?

## 2022-06-17 ENCOUNTER — PATIENT OUTREACH (OUTPATIENT)
Dept: FAMILY MEDICINE CLINIC | Facility: CLINIC | Age: 62
End: 2022-06-17

## 2022-06-17 NOTE — PROGRESS NOTES
Received VM from patient  Returned patients call  Pt wanted to discuss transportation options to get his cataract surgery  Pt states he saw a provider in [place] and was told that he has cataracts  He would like to schedule the surgery but does not have a ride  Pt does not have Medicaid so [place] is not an option  Explained that since the procedure requires some type of sedation he will have to find a friend or a family member to pick him up  Explained that he might be able to take the bus down to the surgery but will need someone to pick him up  Encouraged patient to check with eye care provider  Pt continued to discuss his sobriety  States he is trying, but he is going to family guidance for help  Discussed challenges of navigating alcoholism

## 2022-06-27 ENCOUNTER — OFFICE VISIT (OUTPATIENT)
Dept: FAMILY MEDICINE CLINIC | Facility: CLINIC | Age: 62
End: 2022-06-27
Payer: MEDICARE

## 2022-06-27 ENCOUNTER — TELEPHONE (OUTPATIENT)
Dept: HEMATOLOGY ONCOLOGY | Facility: CLINIC | Age: 62
End: 2022-06-27

## 2022-06-27 VITALS
WEIGHT: 186.6 LBS | HEART RATE: 93 BPM | DIASTOLIC BLOOD PRESSURE: 80 MMHG | SYSTOLIC BLOOD PRESSURE: 148 MMHG | OXYGEN SATURATION: 96 % | HEIGHT: 72 IN | BODY MASS INDEX: 25.27 KG/M2 | RESPIRATION RATE: 16 BRPM | TEMPERATURE: 97.4 F

## 2022-06-27 DIAGNOSIS — I10 ESSENTIAL (PRIMARY) HYPERTENSION: Primary | Chronic | ICD-10-CM

## 2022-06-27 DIAGNOSIS — Z85.46 HX OF PROSTATIC MALIGNANCY: ICD-10-CM

## 2022-06-27 DIAGNOSIS — E11.00 TYPE 2 DIABETES MELLITUS WITH HYPEROSMOLARITY WITHOUT COMA, WITHOUT LONG-TERM CURRENT USE OF INSULIN (HCC): Chronic | ICD-10-CM

## 2022-06-27 PROCEDURE — 99213 OFFICE O/P EST LOW 20 MIN: CPT | Performed by: FAMILY MEDICINE

## 2022-06-27 RX ORDER — TAMSULOSIN HYDROCHLORIDE 0.4 MG/1
0.4 CAPSULE ORAL DAILY
Qty: 90 CAPSULE | Refills: 1 | Status: SHIPPED | OUTPATIENT
Start: 2022-06-27

## 2022-06-27 RX ORDER — NEOMYCIN SULFATE, POLYMYXIN B SULFATE AND DEXAMETHASONE 3.5; 10000; 1 MG/ML; [USP'U]/ML; MG/ML
SUSPENSION/ DROPS OPHTHALMIC
COMMUNITY
Start: 2022-06-23 | End: 2022-10-14

## 2022-06-27 RX ORDER — RANOLAZINE 500 MG/1
500 TABLET, EXTENDED RELEASE ORAL EVERY 12 HOURS
Qty: 60 TABLET | Refills: 3 | Status: SHIPPED | OUTPATIENT
Start: 2022-06-27

## 2022-06-27 RX ORDER — LISINOPRIL 10 MG/1
5 TABLET ORAL DAILY
Qty: 90 TABLET | Refills: 0 | Status: SHIPPED | OUTPATIENT
Start: 2022-06-27

## 2022-07-02 NOTE — PROGRESS NOTES
Assessment/Plan:      Diagnoses and all orders for this visit:    Essential (primary) hypertension  -     Symptomatic hypotension appears resolved  -     Continue current doses  -     Refills sent to desired pharmacy  -     lisinopril (ZESTRIL) 10 mg tablet; Take 0 5 tablets (5 mg total) by mouth daily  -     metoprolol tartrate (LOPRESSOR) 25 mg tablet; Take 0 5 tablets (12 5 mg total) by mouth every 12 (twelve) hours  -     ranolazine (RANEXA) 500 mg 12 hr tablet; Take 1 tablet (500 mg total) by mouth every 12 (twelve) hours    Type 2 diabetes mellitus with hyperosmolarity without coma, without long-term current use of insulin ([facility] )  -     Refill sent to desired pharmacy  -     metFORMIN (GLUCOPHAGE) 1000 MG tablet; Take 1 tablet (1,000 mg total) by mouth every 12 (twelve) hours    Hx of Prostate cancer ([facility] [address] )  -     Refill sent to desired pharmacy  -     tamsulosin (FLOMAX) 0 4 mg; Take 1 capsule (0 4 mg total) by mouth daily        Subjective:     Patient ID: Ruby Simms is a 63 y o  male  Presented for follow-up to discuss labs ordered and medication adjustments made last appointment  No longer reporting lightheaded or hypertensive like symptoms  Review of Systems   Constitutional: Negative for chills and fever  HENT: Negative for ear pain, hearing loss, sore throat and trouble swallowing  Eyes: Negative for pain and visual disturbance  Respiratory: Negative for cough and shortness of breath  Cardiovascular: Negative for chest pain and palpitations  Gastrointestinal: Negative for abdominal pain and vomiting  Genitourinary: Negative for dysuria and hematuria  Musculoskeletal: Negative for arthralgias and back pain  Skin: Negative for color change and rash  Neurological: Negative for seizures and syncope  All other systems reviewed and are negative  Objective:     Physical Exam  Constitutional:       General: He is not in acute distress       Appearance: He is not toxic-appearing  HENT:      Head: Normocephalic and atraumatic  Eyes:      Pupils: Pupils are equal, round, and reactive to light  Cardiovascular:      Heart sounds: Normal heart sounds  Pulmonary:      Effort: Pulmonary effort is normal    Abdominal:      Palpations: Abdomen is soft  Tenderness: There is no abdominal tenderness  Musculoskeletal:         General: No deformity  Skin:     General: Skin is warm and dry  Neurological:      Mental Status: He is alert and oriented to person, place, and time     Psychiatric:         Mood and Affect: Mood normal          Behavior: Behavior normal

## 2022-07-12 ENCOUNTER — TELEPHONE (OUTPATIENT)
Dept: FAMILY MEDICINE CLINIC | Facility: CLINIC | Age: 62
End: 2022-07-12

## 2022-07-25 ENCOUNTER — TELEPHONE (OUTPATIENT)
Dept: GASTROENTEROLOGY | Facility: CLINIC | Age: 62
End: 2022-07-25

## 2022-07-25 NOTE — TELEPHONE ENCOUNTER
Spoke w/ pt - he is going to call us in the next few days to schedule  Please schedule if he calls  I will follow up in 1 week

## 2022-08-16 ENCOUNTER — OFFICE VISIT (OUTPATIENT)
Dept: GASTROENTEROLOGY | Facility: CLINIC | Age: 62
End: 2022-08-16
Payer: MEDICARE

## 2022-08-16 VITALS
HEIGHT: 72 IN | DIASTOLIC BLOOD PRESSURE: 85 MMHG | HEART RATE: 110 BPM | SYSTOLIC BLOOD PRESSURE: 157 MMHG | WEIGHT: 192 LBS | BODY MASS INDEX: 26.01 KG/M2

## 2022-08-16 DIAGNOSIS — F10.10 ALCOHOL ABUSE: Chronic | ICD-10-CM

## 2022-08-16 DIAGNOSIS — D69.6 THROMBOCYTOPENIA (HCC): ICD-10-CM

## 2022-08-16 DIAGNOSIS — Z11.59 ENCOUNTER FOR SCREENING FOR OTHER VIRAL DISEASES: ICD-10-CM

## 2022-08-16 DIAGNOSIS — R13.10 DYSPHAGIA, UNSPECIFIED TYPE: ICD-10-CM

## 2022-08-16 DIAGNOSIS — K70.0 FATTY LIVER, ALCOHOLIC: Primary | ICD-10-CM

## 2022-08-16 DIAGNOSIS — Z12.11 ENCOUNTER FOR COLORECTAL CANCER SCREENING: ICD-10-CM

## 2022-08-16 DIAGNOSIS — Z12.12 ENCOUNTER FOR COLORECTAL CANCER SCREENING: ICD-10-CM

## 2022-08-16 PROCEDURE — 99204 OFFICE O/P NEW MOD 45 MIN: CPT | Performed by: PHYSICIAN ASSISTANT

## 2022-08-16 NOTE — PROGRESS NOTES
[facility] - Outpatient Consultation  Shalonda Jimenez 63 y o  male MRN: [id number]  Encounter: [id number]          ASSESSMENT AND PLAN:      1  Alcohol abuse  Patient will need alcohol cessation-recommend primary team either admit patient for detoxification or admit him to detox unit is likely he will have significant withdrawal symptoms with alcohol cessation as an outpatient  - Ambulatory Referral to Gastroenterology    2  Fatty liver, alcoholic  Patient with hepatomegaly and hepatic steatosis which is likely secondary to alcohol abuse and does have history of elevated LFTs in the past   Will evaluate with serologic workup to evaluate for any other underlying cause of liver disease   - CBC and differential; Future  - Comprehensive metabolic panel; Future  - Protime-INR; Future  - RAUL Screen w/ Reflex to Titer/Pattern; Future  - Antimitochondrial antibody; Future  - Anti-smooth muscle antibody, IgG; Future  - Ceruloplasmin; Future  - Iron Panel (Includes Ferritin, Iron Sat%, Iron, and TIBC); Future  - Chronic Hepatitis Panel; Future  - Alpha-1-antitrypsin; Future  - US abdomen complete; Future    3  Encounter for screening for other viral diseases   - Chronic Hepatitis Panel; Future    4  Thrombocytopenia ([facility] [address] )  Patient with thrombocytopenia which can in turn be related to bone marrow suppression from alcohol abuse or underlying cirrhosis  No evidence of surface contour change on prior ultrasound but will repeat ultrasound bleeding spleen for further evaluation   - US abdomen complete; Future    5  Encounter for colorectal cancer screening patient will need colonoscopy for screening purposes and he will call to schedule this  - Colonoscopy; Future    6  Dysphagia, unspecified type  EGD will be planned for further evaluation  Continue on omeprazole  - EGD; Future    Patient will call to schedule EGD and colonoscopy  Will be difficult to prep due to alcohol withdrawal symptoms  Advised detoxification prior to this  Patient reports he will get upcoming blood work and I did advise him to tell ophthalmologist that he does have history of thrombocytopenia prior to scheduling surgery  ______________________________________________________________________    HPI:    This is a 29-year-old male who presents for evaluation of alcoholic fatty liver  Patient does have history of thrombocytopenia and was seen by GI for consult in 2020 and was supposed to have colonoscopy for screening purposes but has never done so  Patient reports that he was heavily drinking hard alcohol and he did have a history of IV drug use years ago but now is drinking about 12 beers a day  Currently states that he wants to stop drinking but has a girlfriend that drinks alcohol and there is alcohol in the house  He has gone through detox on prior admissions to [facility]   Patient otherwise reports shakiness when not drinking daily  Patient's most recent LFTs were normal but does have history of elevated LFTs in the past   Does admit to difficulty swallowing and painful swallowing and does admit to Occasional reflux symptoms but does take omeprazole daily  Patient continues to smoke cigarettes as well  Patient reports that he has cataracts and has upcoming eye surgery  Patient reports that he follows with family guidance and needs to take course is for alcohol cessation due to previous DUI  Patient does admit to family history of hepatitis and alcohol disease in his brother  Denies any symptoms of lower extremity edema  REVIEW OF SYSTEMS:    CONSTITUTIONAL: Denies any fever, chills, rigors, and weight loss  HEENT: No earache or tinnitus  Denies hearing loss or visual disturbances  CARDIOVASCULAR: No chest pain or palpitations  RESPIRATORY: Denies any cough, hemoptysis, shortness of breath or dyspnea on exertion  GASTROINTESTINAL: As noted in the History of Present Illness     GENITOURINARY: No problems with urination  Denies any hematuria or dysuria  NEUROLOGIC: No dizziness or vertigo, denies headaches  MUSCULOSKELETAL: Denies any muscle or joint pain  SKIN: Denies skin rashes or itching  ENDOCRINE: Denies excessive thirst  Denies intolerance to heat or cold  PSYCHOSOCIAL: Denies depression or anxiety  Denies any recent memory loss  Historical Information   Past Medical History:   Diagnosis Date    Cancer [facility])     prostate ca,had radiation    Cardiac disease     stents,then triple bypass    COPD [facility]chronic obstructive [facility] disease) ([facility] )     ETOH abuse     Hx of heart artery stent     2014    Hyperlipidemia     Hypertension     [facility] 12/22/2019    Prostate CA ([facility] )     S/P CABG x 1     2004     Past Surgical History:   Procedure Laterality Date    CORONARY ARTERY BYPASS GRAFT  2004    [place] ARTHRODESIS ANT INTERBODY MIN DISCECTOMY, CERVICAL BELOW C2 N/A 12/16/2020    Procedure: Anterior cervical discectomy with fusion C4-C7;  Posterior cervical decompression and fusion C2-T2;  Surgeon: Paula Domínguez MD;  Location: [facility];  Service: Neurosurgery    TONSILLECTOMY       Social History   Social History     Substance and Sexual Activity   Alcohol Use Yes    Alcohol/week: 10 0 standard drinks    Types: 10 Shots of liquor per week    Comment: last drink friday     Social History     Substance and Sexual Activity   Drug Use No     Social History     Tobacco Use   Smoking Status Current Every Day Smoker    Packs/day: 1 50    Years: 40 00    Pack years: 60 00    Types: Cigarettes   Smokeless Tobacco Never Used     Family History   Problem Relation Age of Onset    Diabetes Mother     Uterine cancer Mother     COPD Father     Hypertension Father        Meds/Allergies       Current Outpatient Medications:     albuterol (Ventolin HFA) 90 mcg/act inhaler    Blood Glucose Monitoring Suppl (ONE TOUCH ULTRA MINI) w/Device KIT    ferrous sulfate 324 (65 Fe) mg   fluticasone-vilanterol (Breo Ellipta) 200-25 MCG/INH inhaler    folic acid (FOLVITE) 1 mg tablet    gabapentin (NEURONTIN) 300 mg capsule    lisinopril (ZESTRIL) 10 mg tablet    magnesium Oxide (MAG-OX) 400 mg TABS    metFORMIN (GLUCOPHAGE) 1000 MG tablet    metoprolol tartrate (LOPRESSOR) 25 mg tablet    omeprazole (PriLOSEC) 20 mg delayed release capsule    ONETOUCH DELICA LANCETS FINE MISC    pravastatin (PRAVACHOL) 40 mg tablet    ranolazine (RANEXA) 500 mg 12 hr tablet    tamsulosin (FLOMAX) 0 4 mg    thiamine 100 MG tablet    neomycin-polymyxin-dexamethasone (MAXITROL) ophthalmic suspension    No Known Allergies        Objective     Blood pressure 157/85, pulse (!) 110, height 6' (1 829 m), weight 87 1 kg (192 lb)  Body mass index is 26 04 kg/m²  PHYSICAL EXAM:      General Appearance:   Alert, cooperative, no distress   HEENT:   Normocephalic, atraumatic, anicteric      Neck:  Supple, symmetrical, trachea midline   Lungs:   Clear to auscultation bilaterally; no rales, rhonchi or wheezing; respirations unlabored    Heart[de-identified]   Regular rate and rhythm; no murmur, rub, or gallop  Abdomen:   Soft, non-tender, non-distended; normal bowel sounds; no masses, no organomegaly    Genitalia:   Deferred    Rectal:   Deferred    Extremities:  No cyanosis, clubbing or edema    Pulses:  2+ and symmetric    Skin:  No jaundice, rashes, or lesions    Lymph nodes:  No palpable cervical lymphadenopathy        Lab Results:   No visits with results within 1 Day(s) from this visit     Latest known visit with results is:   Appointment on 06/10/2022   Component Date Value    Cholesterol 06/10/2022 193     Triglycerides 06/10/2022 51     HDL, Direct 06/10/2022 83     LDL Calculated 06/10/2022 100     Non-HDL-Chol (CHOL-HDL) 06/10/2022 110     Prostate Specific Antige* 06/10/2022 0 5     PSA, Free 06/10/2022 0 11     PSA, Free Pct 06/10/2022 22 0     Amphetamine Screen, Ur 06/10/2022 Negative     Barbiturate Screen, Ur 06/10/2022 Negative     Cannabinoid Scrn, Ur 06/10/2022 Negative     Methadone Screen, Urine 06/10/2022 Negative     Opiate Scrn, Ur 06/10/2022 Negative     Phencyclidine (PCP), Endy Alexandria* 06/10/2022 Negative     Benzodiazepines 06/10/2022 Negative     Cocaine (Metab ) Urine 06/10/2022 Negative     Propoxyphene Screen, Jerry Dagmar* 06/10/2022 Negative          Radiology Results:   No results found

## 2022-08-18 ENCOUNTER — TELEPHONE (OUTPATIENT)
Dept: FAMILY MEDICINE CLINIC | Facility: CLINIC | Age: 62
End: 2022-08-18

## 2022-09-14 ENCOUNTER — TELEPHONE (OUTPATIENT)
Dept: GASTROENTEROLOGY | Facility: CLINIC | Age: 62
End: 2022-09-14

## 2022-09-14 NOTE — TELEPHONE ENCOUNTER
LMOM for pt to calls us back we need to reschedule the 9/29/22 appt with Leandra  Advised pt we have openings on 10/5, 10/6, & 10/7  Please reschedule the pt if they call back  I will follow up in a few days

## 2022-09-19 NOTE — TELEPHONE ENCOUNTER
Called and lmom asking pt to call back to get appt rescheduled  We will try calling again in 1 week  If pt calls back, please get him rescheduled  Thank you

## 2022-09-22 NOTE — TELEPHONE ENCOUNTER
Pt has been rescheduled to 10/17/22 - gave pt a few weeks out because he missed his US appt and needs to reschedule that

## 2022-10-10 ENCOUNTER — APPOINTMENT (EMERGENCY)
Dept: RADIOLOGY | Facility: HOSPITAL | Age: 62
DRG: 155 | End: 2022-10-10
Payer: MEDICARE

## 2022-10-10 ENCOUNTER — HOSPITAL ENCOUNTER (INPATIENT)
Facility: HOSPITAL | Age: 62
LOS: 4 days | Discharge: LEFT BEFORE DISCHARGE | DRG: 155 | End: 2022-10-14
Attending: EMERGENCY MEDICINE | Admitting: FAMILY MEDICINE
Payer: MEDICARE

## 2022-10-10 DIAGNOSIS — E87.1 HYPONATREMIA: ICD-10-CM

## 2022-10-10 DIAGNOSIS — F10.10 ALCOHOL ABUSE: ICD-10-CM

## 2022-10-10 DIAGNOSIS — F10.939 ALCOHOL WITHDRAWAL SYNDROME WITH COMPLICATION (HCC): Primary | ICD-10-CM

## 2022-10-10 DIAGNOSIS — S02.2XXA NASAL BONE FRACTURES: ICD-10-CM

## 2022-10-10 DIAGNOSIS — E83.42 HYPOMAGNESEMIA: ICD-10-CM

## 2022-10-10 DIAGNOSIS — N17.9 AKI (ACUTE KIDNEY INJURY) (HCC): ICD-10-CM

## 2022-10-10 PROBLEM — G95.19 EDEMA, SPINAL CORD (HCC): Status: RESOLVED | Noted: 2020-12-07 | Resolved: 2022-10-10

## 2022-10-10 PROBLEM — R19.7 DIARRHEA: Status: RESOLVED | Noted: 2019-12-22 | Resolved: 2022-10-10

## 2022-10-10 PROBLEM — E83.51 HYPOCALCEMIA: Status: RESOLVED | Noted: 2021-04-21 | Resolved: 2022-10-10

## 2022-10-10 PROBLEM — R76.8 HEPATITIS C ANTIBODY TEST POSITIVE: Status: RESOLVED | Noted: 2021-04-25 | Resolved: 2022-10-10

## 2022-10-10 PROBLEM — M79.601 PAIN IN BOTH UPPER EXTREMITIES: Status: RESOLVED | Noted: 2020-12-06 | Resolved: 2022-10-10

## 2022-10-10 PROBLEM — R07.9 CHEST PAIN: Status: RESOLVED | Noted: 2021-06-04 | Resolved: 2022-10-10

## 2022-10-10 PROBLEM — S14.129A CENTRAL CORD SYNDROME (HCC): Status: RESOLVED | Noted: 2020-12-08 | Resolved: 2022-10-10

## 2022-10-10 PROBLEM — Z91.199 MEDICAL NON-COMPLIANCE: Chronic | Status: RESOLVED | Noted: 2019-12-22 | Resolved: 2022-10-10

## 2022-10-10 PROBLEM — R42 LIGHTHEADEDNESS: Status: RESOLVED | Noted: 2021-10-25 | Resolved: 2022-10-10

## 2022-10-10 PROBLEM — M79.602 PAIN IN BOTH UPPER EXTREMITIES: Status: RESOLVED | Noted: 2020-12-06 | Resolved: 2022-10-10

## 2022-10-10 PROBLEM — B35.1 ONYCHOMYCOSIS OF LEFT GREAT TOE: Status: RESOLVED | Noted: 2021-05-14 | Resolved: 2022-10-10

## 2022-10-10 PROBLEM — R74.01 TRANSAMINITIS: Status: RESOLVED | Noted: 2021-04-21 | Resolved: 2022-10-10

## 2022-10-10 PROBLEM — E63.8 IMBALANCED NUTRITION: Status: RESOLVED | Noted: 2021-05-13 | Resolved: 2022-10-10

## 2022-10-10 PROBLEM — R53.81 PHYSICAL DECONDITIONING: Status: RESOLVED | Noted: 2021-04-26 | Resolved: 2022-10-10

## 2022-10-10 PROBLEM — M54.9 BACK PAIN: Status: RESOLVED | Noted: 2019-11-23 | Resolved: 2022-10-10

## 2022-10-10 LAB
ALBUMIN SERPL BCP-MCNC: 3.6 G/DL (ref 3.5–5)
ALP SERPL-CCNC: 86 U/L (ref 46–116)
ALT SERPL W P-5'-P-CCNC: 52 U/L (ref 12–78)
ANION GAP SERPL CALCULATED.3IONS-SCNC: 17 MMOL/L (ref 4–13)
APTT PPP: 26 SECONDS (ref 23–37)
AST SERPL W P-5'-P-CCNC: 66 U/L (ref 5–45)
BASOPHILS # BLD AUTO: 0.03 THOUSANDS/ΜL (ref 0–0.1)
BASOPHILS NFR BLD AUTO: 0 % (ref 0–1)
BILIRUB SERPL-MCNC: 0.74 MG/DL (ref 0.2–1)
BUN SERPL-MCNC: 21 MG/DL (ref 5–25)
CALCIUM SERPL-MCNC: 8.5 MG/DL (ref 8.3–10.1)
CHLORIDE SERPL-SCNC: 84 MMOL/L (ref 96–108)
CO2 SERPL-SCNC: 22 MMOL/L (ref 21–32)
CREAT SERPL-MCNC: 2.95 MG/DL (ref 0.6–1.3)
EOSINOPHIL # BLD AUTO: 0.03 THOUSAND/ΜL (ref 0–0.61)
EOSINOPHIL NFR BLD AUTO: 0 % (ref 0–6)
ERYTHROCYTE [DISTWIDTH] IN BLOOD BY AUTOMATED COUNT: 14.9 % (ref 11.6–15.1)
ETHANOL SERPL-MCNC: 39 MG/DL (ref 0–3)
GFR SERPL CREATININE-BSD FRML MDRD: 22 ML/MIN/1.73SQ M
GLUCOSE SERPL-MCNC: 75 MG/DL (ref 65–140)
HCT VFR BLD AUTO: 39.4 % (ref 36.5–49.3)
HGB BLD-MCNC: 13.8 G/DL (ref 12–17)
IMM GRANULOCYTES # BLD AUTO: 0.03 THOUSAND/UL (ref 0–0.2)
IMM GRANULOCYTES NFR BLD AUTO: 0 % (ref 0–2)
INR PPP: 0.92 (ref 0.84–1.19)
LYMPHOCYTES # BLD AUTO: 1 THOUSANDS/ΜL (ref 0.6–4.47)
LYMPHOCYTES NFR BLD AUTO: 15 % (ref 14–44)
MAGNESIUM SERPL-MCNC: 1.2 MG/DL (ref 1.6–2.6)
MCH RBC QN AUTO: 32.9 PG (ref 26.8–34.3)
MCHC RBC AUTO-ENTMCNC: 35 G/DL (ref 31.4–37.4)
MCV RBC AUTO: 94 FL (ref 82–98)
MONOCYTES # BLD AUTO: 0.78 THOUSAND/ΜL (ref 0.17–1.22)
MONOCYTES NFR BLD AUTO: 12 % (ref 4–12)
NEUTROPHILS # BLD AUTO: 4.88 THOUSANDS/ΜL (ref 1.85–7.62)
NEUTS SEG NFR BLD AUTO: 73 % (ref 43–75)
NRBC BLD AUTO-RTO: 0 /100 WBCS
PLATELET # BLD AUTO: 89 THOUSANDS/UL (ref 149–390)
PMV BLD AUTO: 10 FL (ref 8.9–12.7)
POTASSIUM SERPL-SCNC: 5.2 MMOL/L (ref 3.5–5.3)
PROT SERPL-MCNC: 7.4 G/DL (ref 6.4–8.4)
PROTHROMBIN TIME: 12.5 SECONDS (ref 11.6–14.5)
RBC # BLD AUTO: 4.19 MILLION/UL (ref 3.88–5.62)
SARS-COV-2 RNA RESP QL NAA+PROBE: NEGATIVE
SODIUM SERPL-SCNC: 123 MMOL/L (ref 135–147)
WBC # BLD AUTO: 6.75 THOUSAND/UL (ref 4.31–10.16)

## 2022-10-10 PROCEDURE — 82077 ASSAY SPEC XCP UR&BREATH IA: CPT | Performed by: EMERGENCY MEDICINE

## 2022-10-10 PROCEDURE — 83735 ASSAY OF MAGNESIUM: CPT | Performed by: EMERGENCY MEDICINE

## 2022-10-10 PROCEDURE — 99285 EMERGENCY DEPT VISIT HI MDM: CPT

## 2022-10-10 PROCEDURE — G1004 CDSM NDSC: HCPCS

## 2022-10-10 PROCEDURE — 70450 CT HEAD/BRAIN W/O DYE: CPT

## 2022-10-10 PROCEDURE — 72125 CT NECK SPINE W/O DYE: CPT

## 2022-10-10 PROCEDURE — 85730 THROMBOPLASTIN TIME PARTIAL: CPT | Performed by: EMERGENCY MEDICINE

## 2022-10-10 PROCEDURE — 85025 COMPLETE CBC W/AUTO DIFF WBC: CPT | Performed by: EMERGENCY MEDICINE

## 2022-10-10 PROCEDURE — 80053 COMPREHEN METABOLIC PANEL: CPT | Performed by: EMERGENCY MEDICINE

## 2022-10-10 PROCEDURE — U0003 INFECTIOUS AGENT DETECTION BY NUCLEIC ACID (DNA OR RNA); SEVERE ACUTE RESPIRATORY SYNDROME CORONAVIRUS 2 (SARS-COV-2) (CORONAVIRUS DISEASE [COVID-19]), AMPLIFIED PROBE TECHNIQUE, MAKING USE OF HIGH THROUGHPUT TECHNOLOGIES AS DESCRIBED BY CMS-2020-01-R: HCPCS | Performed by: EMERGENCY MEDICINE

## 2022-10-10 PROCEDURE — U0005 INFEC AGEN DETEC AMPLI PROBE: HCPCS | Performed by: EMERGENCY MEDICINE

## 2022-10-10 PROCEDURE — 99285 EMERGENCY DEPT VISIT HI MDM: CPT | Performed by: EMERGENCY MEDICINE

## 2022-10-10 PROCEDURE — 96365 THER/PROPH/DIAG IV INF INIT: CPT

## 2022-10-10 PROCEDURE — 99222 1ST HOSP IP/OBS MODERATE 55: CPT | Performed by: FAMILY MEDICINE

## 2022-10-10 PROCEDURE — 85610 PROTHROMBIN TIME: CPT | Performed by: EMERGENCY MEDICINE

## 2022-10-10 PROCEDURE — 36415 COLL VENOUS BLD VENIPUNCTURE: CPT | Performed by: EMERGENCY MEDICINE

## 2022-10-10 PROCEDURE — 70486 CT MAXILLOFACIAL W/O DYE: CPT

## 2022-10-10 RX ORDER — CHLORDIAZEPOXIDE HYDROCHLORIDE 25 MG/1
50 CAPSULE, GELATIN COATED ORAL ONCE
Status: COMPLETED | OUTPATIENT
Start: 2022-10-10 | End: 2022-10-10

## 2022-10-10 RX ORDER — PRAVASTATIN SODIUM 40 MG
40 TABLET ORAL
Status: DISCONTINUED | OUTPATIENT
Start: 2022-10-11 | End: 2022-10-14 | Stop reason: HOSPADM

## 2022-10-10 RX ORDER — NICOTINE 21 MG/24HR
1 PATCH, TRANSDERMAL 24 HOURS TRANSDERMAL DAILY
Status: DISCONTINUED | OUTPATIENT
Start: 2022-10-11 | End: 2022-10-14 | Stop reason: HOSPADM

## 2022-10-10 RX ORDER — FOLIC ACID 1 MG/1
1 TABLET ORAL DAILY
Status: DISCONTINUED | OUTPATIENT
Start: 2022-10-11 | End: 2022-10-14 | Stop reason: HOSPADM

## 2022-10-10 RX ORDER — MAGNESIUM SULFATE HEPTAHYDRATE 40 MG/ML
2 INJECTION, SOLUTION INTRAVENOUS ONCE
Status: COMPLETED | OUTPATIENT
Start: 2022-10-10 | End: 2022-10-10

## 2022-10-10 RX ORDER — FERROUS SULFATE 325(65) MG
325 TABLET ORAL
Status: DISCONTINUED | OUTPATIENT
Start: 2022-10-11 | End: 2022-10-14 | Stop reason: HOSPADM

## 2022-10-10 RX ORDER — CHLORDIAZEPOXIDE HYDROCHLORIDE 25 MG/1
50 CAPSULE, GELATIN COATED ORAL EVERY 8 HOURS SCHEDULED
Status: DISCONTINUED | OUTPATIENT
Start: 2022-10-11 | End: 2022-10-11

## 2022-10-10 RX ORDER — ALBUTEROL SULFATE 90 UG/1
2 AEROSOL, METERED RESPIRATORY (INHALATION) EVERY 4 HOURS PRN
Status: DISCONTINUED | OUTPATIENT
Start: 2022-10-10 | End: 2022-10-14 | Stop reason: HOSPADM

## 2022-10-10 RX ORDER — LANOLIN ALCOHOL/MO/W.PET/CERES
100 CREAM (GRAM) TOPICAL DAILY
Status: DISCONTINUED | OUTPATIENT
Start: 2022-10-11 | End: 2022-10-14 | Stop reason: HOSPADM

## 2022-10-10 RX ORDER — INSULIN LISPRO 100 [IU]/ML
1-5 INJECTION, SOLUTION INTRAVENOUS; SUBCUTANEOUS
Status: DISCONTINUED | OUTPATIENT
Start: 2022-10-10 | End: 2022-10-14 | Stop reason: HOSPADM

## 2022-10-10 RX ORDER — FOLIC ACID 1 MG/1
1000 TABLET ORAL DAILY
Status: DISCONTINUED | OUTPATIENT
Start: 2022-10-11 | End: 2022-10-10

## 2022-10-10 RX ORDER — RANOLAZINE 500 MG/1
500 TABLET, EXTENDED RELEASE ORAL EVERY 12 HOURS
Status: DISCONTINUED | OUTPATIENT
Start: 2022-10-10 | End: 2022-10-14 | Stop reason: HOSPADM

## 2022-10-10 RX ORDER — LANOLIN ALCOHOL/MO/W.PET/CERES
100 CREAM (GRAM) TOPICAL DAILY
Status: DISCONTINUED | OUTPATIENT
Start: 2022-10-11 | End: 2022-10-10

## 2022-10-10 RX ORDER — TAMSULOSIN HYDROCHLORIDE 0.4 MG/1
0.4 CAPSULE ORAL DAILY
Status: DISCONTINUED | OUTPATIENT
Start: 2022-10-11 | End: 2022-10-14 | Stop reason: HOSPADM

## 2022-10-10 RX ADMIN — CHLORDIAZEPOXIDE HYDROCHLORIDE 50 MG: 25 CAPSULE ORAL at 21:10

## 2022-10-10 RX ADMIN — SODIUM CHLORIDE 1000 ML: 0.9 INJECTION, SOLUTION INTRAVENOUS at 19:55

## 2022-10-10 RX ADMIN — METOPROLOL TARTRATE 12.5 MG: 25 TABLET, FILM COATED ORAL at 23:05

## 2022-10-10 RX ADMIN — MAGNESIUM SULFATE HEPTAHYDRATE 2 G: 40 INJECTION, SOLUTION INTRAVENOUS at 19:55

## 2022-10-10 NOTE — ED PROVIDER NOTES
History  Chief Complaint   Patient presents with   • Ricardo Vuong last night and hit face on a door  No LOC but states he takes a thinner but unsure which one  Right eye swollen and red     Pt is a known alcoholic, that presents emergency department after a fall last night  He states that he tripped, hit his face in a door  He denies loss of consciousness  Patient states that he is on a blood thinner, however the actual medication is unknown  Patient now with periorbital ecchymosis and states that he does not feel well  He states his last drink was earlier this morning  History limited by:  Acuity of condition   used: No    Fall  Mechanism of injury: fall    Injury location:  Head/neck  Head/neck injury location:  Head  Incident location:  Home  Arrived directly from scene: no    Fall:     Fall occurred:  Walking    Point of impact:  Face  Suspicion of alcohol use: yes    Suspicion of drug use: no    Tetanus status:  Unknown  Prior to arrival data:     Bystander interventions:  None    Patient ambulatory at scene: yes      Blood loss:  None    Responsiveness at scene:  Alert    Orientation at scene:  Person, place, situation and time    Loss of consciousness: no      Amnesic to event: no      Airway interventions:  None    Breathing interventions:  None    IV access status:  None    IO access:  None    Fluids administered:  None    Cardiac interventions:  None    Medications administered:  None    Immobilization:  None    Airway condition since incident:  Stable    Breathing condition since incident:  Stable    Circulation condition since incident:  Stable    Mental status condition since incident:  Stable    Disability condition since incident:  Stable  Associated symptoms: no abdominal pain, no back pain, no chest pain, no nausea and no vomiting        Prior to Admission Medications   Prescriptions Last Dose Informant Patient Reported? Taking?    Blood Glucose Monitoring Suppl (ONE TOUCH ULTRA MINI) w/Device KIT  Self Yes No   Sig: Use as directed   ONETOUCH DELICA LANCETS FINE MISC  Self Yes No   Sig: 3 (three) times a day Test   albuterol (Ventolin HFA) 90 mcg/act inhaler  Self No Yes   Sig: Inhale 2 puffs every 4 (four) hours as needed for wheezing   ferrous sulfate 324 (65 Fe) mg  Self No Yes   Sig: Take 1 tablet (324 mg total) by mouth 2 (two) times a day before meals   fluticasone-vilanterol (Breo Ellipta) 200-25 MCG/INH inhaler  Self No Yes   Sig: Inhale 1 puff daily Rinse mouth after use     folic acid (FOLVITE) 1 mg tablet  Self No Yes   Sig: Take 1 tablet (1,000 mcg total) by mouth daily   gabapentin (NEURONTIN) 300 mg capsule  Self No Yes   Sig: TAKE ONE CAPSULE THREE TIMES DAILY   lisinopril (ZESTRIL) 10 mg tablet  Self No Yes   Sig: Take 0 5 tablets (5 mg total) by mouth daily   magnesium Oxide (MAG-OX) 400 mg TABS  Self No Yes   Sig: Take 1 tablet (400 mg total) by mouth 2 (two) times a day   metFORMIN (GLUCOPHAGE) 1000 MG tablet  Self No Yes   Sig: Take 1 tablet (1,000 mg total) by mouth every 12 (twelve) hours   metoprolol tartrate (LOPRESSOR) 25 mg tablet  Self No Yes   Sig: Take 0 5 tablets (12 5 mg total) by mouth every 12 (twelve) hours   neomycin-polymyxin-dexamethasone (MAXITROL) ophthalmic suspension  Self Yes No   Patient not taking: Reported on 8/16/2022   omeprazole (PriLOSEC) 20 mg delayed release capsule  Self No Yes   Sig: Take 1 capsule (20 mg total) by mouth daily   pravastatin (PRAVACHOL) 40 mg tablet  Self No Yes   Sig: Take 1 tablet (40 mg total) by mouth daily with dinner   ranolazine (RANEXA) 500 mg 12 hr tablet  Self No Yes   Sig: Take 1 tablet (500 mg total) by mouth every 12 (twelve) hours   tamsulosin (FLOMAX) 0 4 mg  Self No Yes   Sig: Take 1 capsule (0 4 mg total) by mouth daily   thiamine 100 MG tablet  Self No Yes   Sig: Take 1 tablet (100 mg total) by mouth daily      Facility-Administered Medications: None       Past Medical History:   Diagnosis Date   • Cancer [facility])     prostate ca,had radiation   • Cardiac disease     stents,then triple bypass   • COPD (chronic obstructive pulmonary disease) ([facility] [address] )    • ETOH abuse    • Hx of heart artery stent     2014   • Hyperlipidemia    • Hypertension    • Hypovolemic shock ([facility] [address] ) 12/22/2019   • Prostate CA ([facility] [address] )    • S/P CABG x 1     2004       Past Surgical History:   Procedure Laterality Date   • CORONARY ARTERY BYPASS GRAFT  2004   • MS ARTHRODESIS ANT INTERBODY MIN DISCECTOMY, CERVICAL BELOW C2 N/A 12/16/2020    Procedure: Anterior cervical discectomy with fusion C4-C7; Posterior cervical decompression and fusion C2-T2;  Surgeon: Elliott Gotti MD;  Location: [facility];  Service: Neurosurgery   • TONSILLECTOMY         Family History   Problem Relation Age of Onset   • Diabetes Mother    • Uterine cancer Mother    • COPD Father    • Hypertension Father      I have reviewed and agree with the history as documented  E-Cigarette/Vaping   • E-Cigarette Use Never User      E-Cigarette/Vaping Substances   • Nicotine Yes    • THC No    • CBD No    • Flavoring No    • Other No    • Unknown No      Social History     Tobacco Use   • Smoking status: Current Every Day Smoker     Packs/day: 1 50     Years: 40 00     Pack years: 60 00     Types: Cigarettes   • Smokeless tobacco: Never Used   Vaping Use   • Vaping Use: Never used   Substance Use Topics   • Alcohol use: Yes     Alcohol/week: 10 0 standard drinks     Types: 10 Shots of liquor per week     Comment: last drink friday   • Drug use: No       Review of Systems   Constitutional: Negative for chills and fever  Respiratory: Negative for cough, shortness of breath and wheezing  Cardiovascular: Negative for chest pain and palpitations  Gastrointestinal: Negative for abdominal pain, constipation, diarrhea, nausea and vomiting  Genitourinary: Negative for dysuria, flank pain, hematuria and urgency  Musculoskeletal: Positive for gait problem   Negative for back pain    Skin: Positive for color change  Negative for rash  Neurological: Positive for weakness  All other systems reviewed and are negative  Physical Exam  Physical Exam  Vitals and nursing note reviewed  Constitutional:       Appearance: He is well-developed  HENT:      Head: Normocephalic  Eyes:      Conjunctiva/sclera: Conjunctivae normal       Pupils: Pupils are equal, round, and reactive to light  Cardiovascular:      Rate and Rhythm: Normal rate and regular rhythm  Heart sounds: Normal heart sounds  Pulmonary:      Effort: Pulmonary effort is normal       Breath sounds: Normal breath sounds  Abdominal:      General: Bowel sounds are normal  There is no distension  Palpations: Abdomen is soft  There is no mass  Tenderness: There is no abdominal tenderness  There is no guarding or rebound  Musculoskeletal:         General: No deformity or signs of injury  Cervical back: No tenderness  Skin:     General: Skin is warm and dry  Capillary Refill: Capillary refill takes less than 2 seconds  Neurological:      General: No focal deficit present  Mental Status: He is alert and oriented to person, place, and time  Psychiatric:         Behavior: Behavior normal          Thought Content:  Thought content normal          Judgment: Judgment normal          Vital Signs  ED Triage Vitals   Temperature Pulse Respirations Blood Pressure SpO2   10/10/22 1653 10/10/22 1653 10/10/22 1653 10/10/22 1653 10/10/22 1653   98 5 °F (36 9 °C) 100 18 111/59 94 %      Temp src Heart Rate Source Patient Position - Orthostatic VS BP Location FiO2 (%)   -- 10/10/22 2235 10/10/22 2235 10/10/22 2235 --    Monitor Lying Right arm       Pain Score       10/10/22 1653       5           Vitals:    10/10/22 1653 10/10/22 2235 10/11/22 0038   BP: 111/59 145/65 142/69   Pulse: 100 96 85   Patient Position - Orthostatic VS:  Lying          Visual Acuity      ED Medications  Medications albuterol (PROVENTIL HFA,VENTOLIN HFA) inhaler 2 puff (has no administration in time range)   ferrous sulfate tablet 325 mg (has no administration in time range)   fluticasone-vilanterol (BREO ELLIPTA) 200-25 MCG/INH inhaler 1 puff (has no administration in time range)   magnesium oxide (MAG-OX) tablet 400 mg (400 mg Oral Given 10/11/22 0050)   metoprolol tartrate (LOPRESSOR) partial tablet 12 5 mg (12 5 mg Oral Given 10/10/22 2305)   pravastatin (PRAVACHOL) tablet 40 mg (has no administration in time range)   ranolazine (RANEXA) 12 hr tablet 500 mg (500 mg Oral Given 10/11/22 0137)   tamsulosin (FLOMAX) capsule 0 4 mg (has no administration in time range)   thiamine tablet 100 mg (has no administration in time range)   nicotine (NICODERM CQ) 21 mg/24 hr TD 24 hr patch 1 patch (has no administration in time range)   insulin lispro (HumaLOG) 100 units/mL subcutaneous injection 1-5 Units (1 Units Subcutaneous Not Given 01/17/47 1081)   folic acid (FOLVITE) tablet 1 mg (has no administration in time range)   multivitamin-minerals (CENTRUM) tablet 1 tablet (has no administration in time range)   chlordiazePOXIDE (LIBRIUM) capsule 50 mg (has no administration in time range)   acetaminophen (TYLENOL) tablet 975 mg (975 mg Oral Given 10/11/22 0129)   lidocaine (LIDODERM) 5 % patch 1 patch (1 patch Topical Medication Applied 10/11/22 0149)   sodium chloride 0 9 % bolus 1,000 mL (0 mL Intravenous Stopped 10/10/22 2110)   magnesium sulfate 2 g/50 mL IVPB (premix) 2 g (0 g Intravenous Stopped 10/10/22 2155)   chlordiazePOXIDE (LIBRIUM) capsule 50 mg (50 mg Oral Given 10/10/22 2110)   influenza vaccine, recombinant, quadrivalent (FLUBLOK) IM injection 0 5 mL (0 5 mL Intramuscular Given 10/11/22 0129)       Diagnostic Studies  Results Reviewed     Procedure Component Value Units Date/Time    COVID only [[id number]]  (Normal) Collected: 10/10/22 2148    Lab Status: Final result Specimen: Nares from Nose Updated: 10/10/22 9495 SARS-CoV-2 Negative    Narrative:      FOR PEDIATRIC PATIENTS - copy/paste COVID Guidelines URL to browser: [email or web address]    SARS-CoV-2 assay is a Nucleic Acid Amplification assay intended for the  qualitative detection of nucleic acid from SARS-CoV-2 in nasopharyngeal  swabs  Results are for the presumptive identification of SARS-CoV-2 RNA  Positive results are indicative of infection with SARS-CoV-2, the virus  causing COVID-19, but do not rule out bacterial infection or co-infection  with other viruses  Laboratories within the [place] and its  territories are required to report all positive results to the appropriate  public health authorities  Negative results do not preclude SARS-CoV-2  infection and should not be used as the sole basis for treatment or other  patient management decisions  Negative results must be combined with  clinical observations, patient history, and epidemiological information  This test has not been FDA cleared or approved  This test has been authorized by FDA under an Emergency Use Authorization  (EUA)  This test is only authorized for the duration of time the  declaration that circumstances exist justifying the authorization of the  emergency use of an in vitro diagnostic tests for detection of SARS-CoV-2  virus and/or diagnosis of COVID-19 infection under section 564(b)(1) of  the Act, 21 U  S C  855KDM-0(E)(9), unless the authorization is terminated  or revoked sooner  The test has been validated but independent review by FDA  and CLIA is pending  Test performed using [employer] GeneXpert: This RT-PCR assay targets N2,  a region unique to SARS-CoV-2  A conserved region in the E-gene was chosen  for pan-Sarbecovirus detection which includes SARS-CoV-2  According to CMS-2020-01-R, this platform meets the definition of high-[employer] technology      Basic metabolic panel [[id number]]     Lab Status: No result Specimen: Blood     Comprehensive metabolic panel [[id number]]  (Abnormal) Collected: 10/10/22 1922    Lab Status: Final result Specimen: Blood from Arm, Left Updated: 10/10/22 1943     Sodium 123 mmol/L      Potassium 5 2 mmol/L      Chloride 84 mmol/L      CO2 22 mmol/L      ANION GAP 17 mmol/L      BUN 21 mg/dL      Creatinine 2 95 mg/dL      Glucose 75 mg/dL      Calcium 8 5 mg/dL      AST 66 U/L      ALT 52 U/L      Alkaline Phosphatase 86 U/L      Total Protein 7 4 g/dL      Albumin 3 6 g/dL      Total Bilirubin 0 74 mg/dL      eGFR 22 ml/min/1 73sq m     Narrative:      National Kidney Disease Foundation guidelines for Chronic Kidney Disease (CKD):   •  Stage 1 with normal or high GFR (GFR > 90 mL/min/1 73 square meters)  •  Stage 2 Mild CKD (GFR = 60-89 mL/min/1 73 square meters)  •  Stage 3A Moderate CKD (GFR = 45-59 mL/min/1 73 square meters)  •  Stage 3B Moderate CKD (GFR = 30-44 mL/min/1 73 square meters)  •  Stage 4 Severe CKD (GFR = 15-29 mL/min/1 73 square meters)  •  Stage 5 End Stage CKD (GFR <15 mL/min/1 73 square meters)  Note: GFR calculation is accurate only with a steady state creatinine    Magnesium [[id number]]  (Abnormal) Collected: 10/10/22 1922    Lab Status: Final result Specimen: Blood from Arm, Left Updated: 10/10/22 1943     Magnesium 1 2 mg/dL     CBC and differential [[id number]]  (Abnormal) Collected: 10/10/22 1839    Lab Status: Final result Specimen: Blood from Arm, Left Updated: 10/10/22 1906     WBC 6 75 Thousand/uL      RBC 4 19 Million/uL      Hemoglobin 13 8 g/dL      Hematocrit 39 4 %      MCV 94 fL      MCH 32 9 pg      MCHC 35 0 g/dL      RDW 14 9 %      MPV 10 0 fL      Platelets 89 Thousands/uL      nRBC 0 /100 WBCs      Neutrophils Relative 73 %      Immat GRANS % 0 %      Lymphocytes Relative 15 %      Monocytes Relative 12 %      Eosinophils Relative 0 %      Basophils Relative 0 %      Neutrophils Absolute 4 88 Thousands/µL      Immature Grans Absolute 0 03 Thousand/uL Lymphocytes Absolute 1 00 Thousands/µL      Monocytes Absolute 0 78 Thousand/µL      Eosinophils Absolute 0 03 Thousand/µL      Basophils Absolute 0 03 Thousands/µL     Narrative:      No Clots    Protime-INR [[id number]]  (Normal) Collected: 10/10/22 1839    Lab Status: Final result Specimen: Blood from Arm, Left Updated: 10/10/22 1858     Protime 12 5 seconds      INR 0 92    APTT [[id number]]  (Normal) Collected: 10/10/22 1839    Lab Status: Final result Specimen: Blood from Arm, Left Updated: 10/10/22 1858     PTT 26 seconds     Ethanol [[id number]]  (Abnormal) Collected: 10/10/22 1839    Lab Status: Final result Specimen: Blood from Arm, Left Updated: 10/10/22 1856     Ethanol Lvl 39 mg/dL                  CT facial bones without contrast   Final Result by Mague Mckeon MD (10/10 1959)      Nasal bone fracture  Workstation performed: [id number]         CT cervical spine without contrast   Final Result by Mague Mckeon MD (10/10 2006)      No cervical spine fracture or traumatic malalignment  Chronic, nonemergent findings above  Workstation performed: [id number]         CT head without contrast   Final Result by Mague Mckeon MD (10/10 1950)      No acute intracranial abnormality  Nonemergent findings above  Workstation performed: [id number]                    Procedures  Procedures         ED Course                               SBIRT 20yo+    Flowsheet Row Most Recent Value   SBIRT (25 yo +)    In order to provide better care to our patients, we are screening all of our patients for alcohol and drug use  Would it be okay to ask you these screening questions?  No Filed at: 10/10/2022 1735                    MDM  Number of Diagnoses or Management Options  ENMA (acute kidney injury) ([facility] [address] ): new and requires workup  Alcohol abuse: new and requires workup  Hypomagnesemia: new and requires workup  Hyponatremia: new and requires workup  Nasal bone fractures: new and requires workup     Amount and/or Complexity of Data Reviewed  Clinical lab tests: ordered and reviewed  Tests in the radiology section of CPT®: ordered and reviewed    Risk of Complications, Morbidity, and/or Mortality  Presenting problems: high  Diagnostic procedures: high  Management options: high    Patient Progress  Patient progress: stable      Disposition  Final diagnoses:   Nasal bone fractures   Alcohol abuse   Hyponatremia   Hypomagnesemia   [facility]     Time reflects when diagnosis was documented in both MDM as applicable and the Disposition within this note     Time User Action Codes Description Comment    10/10/2022  8:22 PM [place]  Add [S02  2XXA] Nasal bone fractures     10/10/2022  8:22 PM [place]  O Add [F10 10] Alcohol abuse     10/10/2022  8:22 PM Oyesanmi, Olubusola O Add [E87 1] Hyponatremia     10/10/2022  8:22 PM [place]  O Add [E83 42] Hypomagnesemia     10/10/2022  8:22 PM Oyesanmi, Olubusola O Add [N17 9] ENMA (acute kidney injury) ([facility] )     10/11/2022  1:24 AM Silke SHAFER Add [F10 939] Alcohol withdrawal syndrome with complication [facility])       ED Disposition     ED Disposition   Admit    Condition   Stable    Date/Time   Mon Oct 10, 2022  8:22 PM    Comment   Case was discussed with Dr Marcos Khoury and the patient's admission status was agreed to be Admission Status: inpatient status to the service of Dr Marcos Khoury   Follow-up Information    None         Current Discharge Medication List      CONTINUE these medications which have NOT CHANGED    Details   albuterol (Ventolin HFA) 90 mcg/act inhaler Inhale 2 puffs every 4 (four) hours as needed for wheezing  Qty: 18 g, Refills: 0    Comments: Substitution to a formulary equivalent within the same pharmaceutical class is authorized    Associated Diagnoses: COPD (chronic obstructive pulmonary disease) ([facility])      ferrous sulfate 324 (65 Fe) mg Take 1 tablet (324 mg total) by mouth 2 (two) times a day before meals  Qty: 60 tablet, Refills: 2    Associated Diagnoses: Alcohol abuse      fluticasone-vilanterol (Breo Ellipta) 200-25 MCG/INH inhaler Inhale 1 puff daily Rinse mouth after use    Qty: 60 each, Refills: 0    Associated Diagnoses: COPD (chronic obstructive pulmonary disease) ([facility])      folic acid (FOLVITE) 1 mg tablet Take 1 tablet (1,000 mcg total) by mouth daily  Qty: 90 tablet, Refills: 2    Associated Diagnoses: Alcohol abuse      gabapentin (NEURONTIN) 300 mg capsule TAKE ONE CAPSULE THREE TIMES DAILY  Qty: 90 capsule, Refills: 1    Associated Diagnoses: Edema, spinal cord ([facility])      lisinopril (ZESTRIL) 10 mg tablet Take 0 5 tablets (5 mg total) by mouth daily  Qty: 90 tablet, Refills: 0    Associated Diagnoses: Essential (primary) hypertension      magnesium Oxide (MAG-OX) 400 mg TABS Take 1 tablet (400 mg total) by mouth 2 (two) times a day  Qty: 120 tablet, Refills: 2    Associated Diagnoses: Alcohol abuse      metFORMIN (GLUCOPHAGE) 1000 MG tablet Take 1 tablet (1,000 mg total) by mouth every 12 (twelve) hours  Qty: 180 tablet, Refills: 0    Associated Diagnoses: Type 2 diabetes mellitus with hyperosmolarity without coma, without long-term current use of insulin ([facility])      metoprolol tartrate (LOPRESSOR) 25 mg tablet Take 0 5 tablets (12 5 mg total) by mouth every 12 (twelve) hours  Qty: 180 tablet, Refills: 2    Associated Diagnoses: Essential (primary) hypertension      omeprazole (PriLOSEC) 20 mg delayed release capsule Take 1 capsule (20 mg total) by mouth daily  Qty: 90 capsule, Refills: 3    Associated Diagnoses: Gastroesophageal reflux disease, unspecified whether esophagitis present      pravastatin (PRAVACHOL) 40 mg tablet Take 1 tablet (40 mg total) by mouth daily with dinner  Qty: 90 tablet, Refills: 2    Associated Diagnoses: Essential (primary) hypertension      ranolazine (RANEXA) 500 mg 12 hr tablet Take 1 tablet (500 mg total) by mouth every 12 (twelve) hours  Qty: 60 tablet, Refills: 3    Associated Diagnoses: Essential (primary) hypertension      tamsulosin (FLOMAX) 0 4 mg Take 1 capsule (0 4 mg total) by mouth daily  Qty: 90 capsule, Refills: 1    Associated Diagnoses: Hx of prostatic malignancy      thiamine 100 MG tablet Take 1 tablet (100 mg total) by mouth daily  Qty: 90 tablet, Refills: 0    Associated Diagnoses: Alcohol abuse      Blood Glucose Monitoring Suppl (ONE TOUCH ULTRA MINI) w/Device KIT Use as directed  Refills: 0      neomycin-polymyxin-dexamethasone (MAXITROL) ophthalmic suspension       ONETOUCH DELICA LANCETS FINE MISC 3 (three) times a day Test  Refills: 0             No discharge procedures on file      PDMP Review       Value Time User    PDMP Reviewed  Yes 7/26/2021  4:50 PM Kendall Guerin MD          ED Provider  Electronically Signed by           Danette Samuel DO  10/11/22 0155

## 2022-10-11 ENCOUNTER — PATIENT OUTREACH (OUTPATIENT)
Dept: FAMILY MEDICINE CLINIC | Facility: CLINIC | Age: 62
End: 2022-10-11

## 2022-10-11 LAB
AMPHETAMINES SERPL QL SCN: NEGATIVE
ANION GAP SERPL CALCULATED.3IONS-SCNC: 7 MMOL/L (ref 4–13)
ANION GAP SERPL CALCULATED.3IONS-SCNC: 8 MMOL/L (ref 4–13)
ANION GAP SERPL CALCULATED.3IONS-SCNC: 9 MMOL/L (ref 4–13)
ANION GAP SERPL CALCULATED.3IONS-SCNC: 9 MMOL/L (ref 4–13)
BARBITURATES UR QL: NEGATIVE
BASOPHILS # BLD AUTO: 0.05 THOUSANDS/ΜL (ref 0–0.1)
BASOPHILS NFR BLD AUTO: 1 % (ref 0–1)
BENZODIAZ UR QL: NEGATIVE
BUN SERPL-MCNC: 23 MG/DL (ref 5–25)
BUN SERPL-MCNC: 24 MG/DL (ref 5–25)
BUN SERPL-MCNC: 24 MG/DL (ref 5–25)
BUN SERPL-MCNC: 25 MG/DL (ref 5–25)
CALCIUM SERPL-MCNC: 8.3 MG/DL (ref 8.3–10.1)
CALCIUM SERPL-MCNC: 8.4 MG/DL (ref 8.3–10.1)
CALCIUM SERPL-MCNC: 8.5 MG/DL (ref 8.3–10.1)
CALCIUM SERPL-MCNC: 8.8 MG/DL (ref 8.3–10.1)
CHLORIDE SERPL-SCNC: 87 MMOL/L (ref 96–108)
CHLORIDE SERPL-SCNC: 88 MMOL/L (ref 96–108)
CHLORIDE SERPL-SCNC: 90 MMOL/L (ref 96–108)
CHLORIDE SERPL-SCNC: 92 MMOL/L (ref 96–108)
CO2 SERPL-SCNC: 26 MMOL/L (ref 21–32)
CO2 SERPL-SCNC: 27 MMOL/L (ref 21–32)
COCAINE UR QL: NEGATIVE
CREAT SERPL-MCNC: 1.42 MG/DL (ref 0.6–1.3)
CREAT SERPL-MCNC: 1.62 MG/DL (ref 0.6–1.3)
CREAT SERPL-MCNC: 1.96 MG/DL (ref 0.6–1.3)
CREAT SERPL-MCNC: 2.33 MG/DL (ref 0.6–1.3)
EOSINOPHIL # BLD AUTO: 0.09 THOUSAND/ΜL (ref 0–0.61)
EOSINOPHIL NFR BLD AUTO: 2 % (ref 0–6)
ERYTHROCYTE [DISTWIDTH] IN BLOOD BY AUTOMATED COUNT: 14.4 % (ref 11.6–15.1)
GFR SERPL CREATININE-BSD FRML MDRD: 29 ML/MIN/1.73SQ M
GFR SERPL CREATININE-BSD FRML MDRD: 36 ML/MIN/1.73SQ M
GFR SERPL CREATININE-BSD FRML MDRD: 45 ML/MIN/1.73SQ M
GFR SERPL CREATININE-BSD FRML MDRD: 53 ML/MIN/1.73SQ M
GLUCOSE SERPL-MCNC: 106 MG/DL (ref 65–140)
GLUCOSE SERPL-MCNC: 110 MG/DL (ref 65–140)
GLUCOSE SERPL-MCNC: 110 MG/DL (ref 65–140)
GLUCOSE SERPL-MCNC: 123 MG/DL (ref 65–140)
GLUCOSE SERPL-MCNC: 125 MG/DL (ref 65–140)
GLUCOSE SERPL-MCNC: 142 MG/DL (ref 65–140)
GLUCOSE SERPL-MCNC: 89 MG/DL (ref 65–140)
GLUCOSE SERPL-MCNC: 90 MG/DL (ref 65–140)
GLUCOSE SERPL-MCNC: 92 MG/DL (ref 65–140)
HCT VFR BLD AUTO: 36.3 % (ref 36.5–49.3)
HGB BLD-MCNC: 12.7 G/DL (ref 12–17)
IMM GRANULOCYTES # BLD AUTO: 0.03 THOUSAND/UL (ref 0–0.2)
IMM GRANULOCYTES NFR BLD AUTO: 1 % (ref 0–2)
LYMPHOCYTES # BLD AUTO: 1 THOUSANDS/ΜL (ref 0.6–4.47)
LYMPHOCYTES NFR BLD AUTO: 20 % (ref 14–44)
MAGNESIUM SERPL-MCNC: 1.8 MG/DL (ref 1.6–2.6)
MCH RBC QN AUTO: 32.6 PG (ref 26.8–34.3)
MCHC RBC AUTO-ENTMCNC: 35 G/DL (ref 31.4–37.4)
MCV RBC AUTO: 93 FL (ref 82–98)
METHADONE UR QL: NEGATIVE
MONOCYTES # BLD AUTO: 0.79 THOUSAND/ΜL (ref 0.17–1.22)
MONOCYTES NFR BLD AUTO: 15 % (ref 4–12)
NEUTROPHILS # BLD AUTO: 3.16 THOUSANDS/ΜL (ref 1.85–7.62)
NEUTS SEG NFR BLD AUTO: 61 % (ref 43–75)
NRBC BLD AUTO-RTO: 0 /100 WBCS
OPIATES UR QL SCN: NEGATIVE
OXYCODONE+OXYMORPHONE UR QL SCN: NEGATIVE
PCP UR QL: NEGATIVE
PLATELET # BLD AUTO: 79 THOUSANDS/UL (ref 149–390)
PMV BLD AUTO: 10.1 FL (ref 8.9–12.7)
POTASSIUM SERPL-SCNC: 4.9 MMOL/L (ref 3.5–5.3)
POTASSIUM SERPL-SCNC: 5 MMOL/L (ref 3.5–5.3)
POTASSIUM SERPL-SCNC: 5 MMOL/L (ref 3.5–5.3)
POTASSIUM SERPL-SCNC: 5.6 MMOL/L (ref 3.5–5.3)
RBC # BLD AUTO: 3.89 MILLION/UL (ref 3.88–5.62)
SODIUM SERPL-SCNC: 121 MMOL/L (ref 135–147)
SODIUM SERPL-SCNC: 123 MMOL/L (ref 135–147)
SODIUM SERPL-SCNC: 125 MMOL/L (ref 135–147)
SODIUM SERPL-SCNC: 126 MMOL/L (ref 135–147)
THC UR QL: NEGATIVE
WBC # BLD AUTO: 5.12 THOUSAND/UL (ref 4.31–10.16)

## 2022-10-11 PROCEDURE — 82948 REAGENT STRIP/BLOOD GLUCOSE: CPT

## 2022-10-11 PROCEDURE — 92526 ORAL FUNCTION THERAPY: CPT

## 2022-10-11 PROCEDURE — 80048 BASIC METABOLIC PNL TOTAL CA: CPT | Performed by: STUDENT IN AN ORGANIZED HEALTH CARE EDUCATION/TRAINING PROGRAM

## 2022-10-11 PROCEDURE — 83735 ASSAY OF MAGNESIUM: CPT | Performed by: STUDENT IN AN ORGANIZED HEALTH CARE EDUCATION/TRAINING PROGRAM

## 2022-10-11 PROCEDURE — 90682 RIV4 VACC RECOMBINANT DNA IM: CPT | Performed by: FAMILY MEDICINE

## 2022-10-11 PROCEDURE — 85025 COMPLETE CBC W/AUTO DIFF WBC: CPT | Performed by: STUDENT IN AN ORGANIZED HEALTH CARE EDUCATION/TRAINING PROGRAM

## 2022-10-11 PROCEDURE — 80307 DRUG TEST PRSMV CHEM ANLYZR: CPT

## 2022-10-11 PROCEDURE — G0008 ADMIN INFLUENZA VIRUS VAC: HCPCS | Performed by: FAMILY MEDICINE

## 2022-10-11 RX ORDER — CHLORDIAZEPOXIDE HYDROCHLORIDE 25 MG/1
25 CAPSULE, GELATIN COATED ORAL EVERY 8 HOURS SCHEDULED
Status: COMPLETED | OUTPATIENT
Start: 2022-10-11 | End: 2022-10-12

## 2022-10-11 RX ORDER — CHLORDIAZEPOXIDE HYDROCHLORIDE 25 MG/1
25 CAPSULE, GELATIN COATED ORAL EVERY 12 HOURS
Status: DISCONTINUED | OUTPATIENT
Start: 2022-10-14 | End: 2022-10-11

## 2022-10-11 RX ORDER — LIDOCAINE 50 MG/G
1 PATCH TOPICAL DAILY
Status: DISCONTINUED | OUTPATIENT
Start: 2022-10-11 | End: 2022-10-14 | Stop reason: HOSPADM

## 2022-10-11 RX ORDER — CHLORDIAZEPOXIDE HYDROCHLORIDE 25 MG/1
50 CAPSULE, GELATIN COATED ORAL EVERY 12 HOURS
Status: DISCONTINUED | OUTPATIENT
Start: 2022-10-13 | End: 2022-10-11

## 2022-10-11 RX ORDER — BENZONATATE 100 MG/1
100 CAPSULE ORAL 3 TIMES DAILY PRN
Status: DISCONTINUED | OUTPATIENT
Start: 2022-10-11 | End: 2022-10-14 | Stop reason: HOSPADM

## 2022-10-11 RX ORDER — CHLORDIAZEPOXIDE HYDROCHLORIDE 5 MG/1
5 CAPSULE, GELATIN COATED ORAL EVERY 8 HOURS SCHEDULED
Status: DISCONTINUED | OUTPATIENT
Start: 2022-10-14 | End: 2022-10-14

## 2022-10-11 RX ORDER — CHLORDIAZEPOXIDE HYDROCHLORIDE 10 MG/1
10 CAPSULE, GELATIN COATED ORAL EVERY 8 HOURS SCHEDULED
Status: DISCONTINUED | OUTPATIENT
Start: 2022-10-13 | End: 2022-10-14

## 2022-10-11 RX ORDER — LIDOCAINE 50 MG/G
1 PATCH TOPICAL DAILY
Status: DISCONTINUED | OUTPATIENT
Start: 2022-10-11 | End: 2022-10-11

## 2022-10-11 RX ORDER — CHLORDIAZEPOXIDE HYDROCHLORIDE 25 MG/1
25 CAPSULE, GELATIN COATED ORAL EVERY 12 HOURS
Status: DISCONTINUED | OUTPATIENT
Start: 2022-10-15 | End: 2022-10-11

## 2022-10-11 RX ORDER — ACETAMINOPHEN 325 MG/1
975 TABLET ORAL EVERY 8 HOURS PRN
Status: DISCONTINUED | OUTPATIENT
Start: 2022-10-11 | End: 2022-10-14 | Stop reason: HOSPADM

## 2022-10-11 RX ORDER — CHLORDIAZEPOXIDE HYDROCHLORIDE 25 MG/1
50 CAPSULE, GELATIN COATED ORAL EVERY 8 HOURS SCHEDULED
Status: DISCONTINUED | OUTPATIENT
Start: 2022-10-11 | End: 2022-10-11

## 2022-10-11 RX ORDER — OXYCODONE HYDROCHLORIDE AND ACETAMINOPHEN 5; 325 MG/1; MG/1
1 TABLET ORAL ONCE
Status: DISCONTINUED | OUTPATIENT
Start: 2022-10-11 | End: 2022-10-11

## 2022-10-11 RX ORDER — SODIUM CHLORIDE 9 MG/ML
100 INJECTION, SOLUTION INTRAVENOUS CONTINUOUS
Status: DISCONTINUED | OUTPATIENT
Start: 2022-10-11 | End: 2022-10-14 | Stop reason: HOSPADM

## 2022-10-11 RX ORDER — FOLIC ACID 1 MG/1
1 TABLET ORAL DAILY
Status: DISCONTINUED | OUTPATIENT
Start: 2022-10-11 | End: 2022-10-11

## 2022-10-11 RX ORDER — AMLODIPINE BESYLATE 2.5 MG/1
2.5 TABLET ORAL DAILY
Status: DISCONTINUED | OUTPATIENT
Start: 2022-10-11 | End: 2022-10-14 | Stop reason: HOSPADM

## 2022-10-11 RX ORDER — LANOLIN ALCOHOL/MO/W.PET/CERES
100 CREAM (GRAM) TOPICAL DAILY
Status: DISCONTINUED | OUTPATIENT
Start: 2022-10-11 | End: 2022-10-11

## 2022-10-11 RX ADMIN — PRAVASTATIN SODIUM 40 MG: 40 TABLET ORAL at 16:49

## 2022-10-11 RX ADMIN — CHLORDIAZEPOXIDE HYDROCHLORIDE 25 MG: 25 CAPSULE ORAL at 21:36

## 2022-10-11 RX ADMIN — CHLORDIAZEPOXIDE HYDROCHLORIDE 50 MG: 25 CAPSULE ORAL at 14:48

## 2022-10-11 RX ADMIN — MAGNESIUM OXIDE TAB 400 MG (241.3 MG ELEMENTAL MG) 400 MG: 400 (241.3 MG) TAB at 00:50

## 2022-10-11 RX ADMIN — LIDOCAINE 5% 1 PATCH: 700 PATCH TOPICAL at 09:02

## 2022-10-11 RX ADMIN — RANOLAZINE 500 MG: 500 TABLET, EXTENDED RELEASE ORAL at 01:37

## 2022-10-11 RX ADMIN — METOPROLOL TARTRATE 12.5 MG: 25 TABLET, FILM COATED ORAL at 19:57

## 2022-10-11 RX ADMIN — LIDOCAINE 5% 1 PATCH: 700 PATCH TOPICAL at 01:49

## 2022-10-11 RX ADMIN — METOPROLOL TARTRATE 12.5 MG: 25 TABLET, FILM COATED ORAL at 09:09

## 2022-10-11 RX ADMIN — INFLUENZA A VIRUS A/WISCONSIN/588/2019 (H1N1) RECOMBINANT HEMAGGLUTININ ANTIGEN, INFLUENZA A VIRUS A/DARWIN/6/2021 (H3N2) RECOMBINANT HEMAGGLUTININ ANTIGEN, INFLUENZA B VIRUS B/AUSTRIA/1359417/2021 RECOMBINANT HEMAGGLUTININ ANTIGEN, AND INFLUENZA B VIRUS B/PHUKET/3073/2013 RECOMBINANT HEMAGGLUTININ ANTIGEN 0.5 ML: 45; 45; 45; 45 INJECTION INTRAMUSCULAR at 01:29

## 2022-10-11 RX ADMIN — MAGNESIUM OXIDE TAB 400 MG (241.3 MG ELEMENTAL MG) 400 MG: 400 (241.3 MG) TAB at 17:47

## 2022-10-11 RX ADMIN — RANOLAZINE 500 MG: 500 TABLET, EXTENDED RELEASE ORAL at 21:36

## 2022-10-11 RX ADMIN — THIAMINE HCL TAB 100 MG 100 MG: 100 TAB at 08:59

## 2022-10-11 RX ADMIN — TAMSULOSIN HYDROCHLORIDE 0.4 MG: 0.4 CAPSULE ORAL at 08:59

## 2022-10-11 RX ADMIN — FOLIC ACID 1 MG: 1 TABLET ORAL at 08:59

## 2022-10-11 RX ADMIN — SODIUM CHLORIDE 100 ML/HR: 0.9 INJECTION, SOLUTION INTRAVENOUS at 07:04

## 2022-10-11 RX ADMIN — ACETAMINOPHEN 975 MG: 325 TABLET, FILM COATED ORAL at 01:29

## 2022-10-11 RX ADMIN — RANOLAZINE 500 MG: 500 TABLET, EXTENDED RELEASE ORAL at 09:10

## 2022-10-11 RX ADMIN — ACETAMINOPHEN 975 MG: 325 TABLET, FILM COATED ORAL at 17:50

## 2022-10-11 RX ADMIN — AMLODIPINE BESYLATE 2.5 MG: 2.5 TABLET ORAL at 23:10

## 2022-10-11 RX ADMIN — BENZONATATE 100 MG: 100 CAPSULE ORAL at 21:36

## 2022-10-11 RX ADMIN — FERROUS SULFATE TAB 325 MG (65 MG ELEMENTAL FE) 325 MG: 325 (65 FE) TAB at 07:59

## 2022-10-11 RX ADMIN — CHLORDIAZEPOXIDE HYDROCHLORIDE 50 MG: 25 CAPSULE ORAL at 05:35

## 2022-10-11 RX ADMIN — NICOTINE 1 PATCH: 21 PATCH, EXTENDED RELEASE TRANSDERMAL at 08:59

## 2022-10-11 RX ADMIN — FLUTICASONE FUROATE AND VILANTEROL TRIFENATATE 1 PUFF: 200; 25 POWDER RESPIRATORY (INHALATION) at 09:57

## 2022-10-11 RX ADMIN — MAGNESIUM OXIDE TAB 400 MG (241.3 MG ELEMENTAL MG) 400 MG: 400 (241.3 MG) TAB at 08:59

## 2022-10-11 RX ADMIN — Medication 1 TABLET: at 08:59

## 2022-10-11 NOTE — ASSESSMENT & PLAN NOTE
Acute on Chronic   · Platelets on admission 89 --> 79  · Patient has a long history of thrombocytopenia likely secondary to alcohol abuse

## 2022-10-11 NOTE — H&P
H&P Exam - Caffie Sat 63 y o  male MRN: [id number]    Unit/Bed#: ED HW3 Encounter: [id number]      Assessment/Plan   * Alcohol withdrawal [facility])  Assessment & Plan  Ethanol level on admission was 39, last drink was yesterday on 10/09 at 7:00 p m  Patient endorses daily drinking  · Continue thiamine, folic acid  · UDS  · Fall precautions  · Seizure precautions  · Start on librium protocol  Hyponatremia  Assessment & Plan  Admission Na 123, received 1L NS bolus at the ER  Likely secondary to dehydration due to diarrhea/vomiting and poor PO intake  · Repeat BMP in 4 hours  · Monitor sodium  · IVF pending BMP recheck to avoid over-correction  Hypomagnesemia  Assessment & Plan  Magnesium on admission at 1 2, received 2 gm of magnesium sulfate at the ER, likely secondary to GI losses with chronic alcohol use  · Trend daily  · Replete as necessary    COPD (chronic obstructive pulmonary disease) ([facility])  Assessment & Plan  Continue home inhalers breo-ellipta and albuterol inhaler  · Duo-nebs q6h PRN  Nasal bone fracture  Assessment & Plan  Patient presented today after a fall that occurred on 10/09  CT scan showed nasal bone fracture  Acute on chronic kidney failure [facility])  Assessment & Plan  Lab Results   Component Value Date    EGFR 22 10/10/2022    EGFR 57 04/11/2022    EGFR 46 04/10/2022    CREATININE 2 95 (H) 10/10/2022    CREATININE 1 34 (H) 04/11/2022    CREATININE 1 59 (H) 04/10/2022   Baseline creatinine ranges from 1 3 to 1 5  Creatinine elevated on admission at 2 95   · Monitor inputs and outputs  · Hold/avoid nephrotoxic medication  · Trend BMP daily    History of Atrial fibrillation ([facility])  Assessment & Plan  History of paroxysmal a-fib  · Continue home medications  · On telemetry for multiple electrolyte abnormalities      Chronic heart failure with preserved ejection fraction [facility])  Assessment & Plan  Wt Readings from Last 3 Encounters:   10/10/22 87 1 kg (192 lb)   08/16/22 87 1 kg (192 lb)   06/27/22 84 6 kg (186 lb 9 6 oz)     Last echo done on 12/2020 showed EF 55-60%, G1DD  · Continue home medications, hold nephrotoxic drugs  · Daily weights, monitor I/O  Alcohol abuse  Assessment & Plan  Ethanol level on admission was 39, last drink was yesterday on 10/09 at 7:00 p m  Patient endorses daily drinking  · Continue thiamine, folic acid  · UDS    Thrombocytopenia ([address] )  Assessment & Plan  Platelets on admission 89  · Patient has a long history of thrombocytopenia likely secondary to alcohol abuse  CAD (coronary artery disease)  Assessment & Plan  Hx of CAD s/p CABG 2004, PCI to [facility] 2014  Not on eliquis secondary to history of falls, chronic alcohol use, thrombocytopenia, increased risk of bleeding  Type 2 diabetes mellitus [facility])  Assessment & Plan  Lab Results   Component Value Date    HGBA1C 5 1 04/09/2022       No results for input(s): POCGLU in the last 72 hours  Blood Sugar Average: Last 72 hrs: Insulin sliding scale, hold home metformin setting of acute on chronic renal failure  History of Present Illness     HPI:  Shalonda Jimenez is a 63 y o  male who presents with nasal fracture secondary to fall  Patient states that he was drinking and tripped and fell into the door knob of an open door  He notes that his last drink was at 7:00 p m  10-9  The patient states that he drinks either a quart of hard liquor or a 12 pack of beer a day  Patient denies chills, fever, abdominal distension, chest pain, urinary symptoms, but he endorses nausea, vomiting, diarrhea, and shortness of breath secondary to tobacco use  CT imaging of the face revealed nasal bone fracture  Patient reports that he last took his medications yesterday morning, but does not recall which medications  PCP: Lucina Moreno MD    Review of Systems   Constitutional: Negative  HENT: Negative  Eyes: Positive for pain  Respiratory: Positive for shortness of breath   Negative for cough and choking  Cardiovascular: Negative  Gastrointestinal: Positive for diarrhea, nausea and vomiting  Negative for constipation and rectal pain  Genitourinary: Negative  Musculoskeletal: Negative  Skin: Negative  Neurological: Negative  Hematological: Negative  Psychiatric/Behavioral: Positive for confusion  Historical Information   Past Medical History:   Diagnosis Date   • Cancer [facility])     prostate ca,had radiation   • Cardiac disease     stents,then triple bypass   • COPD (chronic obstructive pulmonary disease) ([facility] )    • ETOH abuse    • Hx of heart artery stent     2014   • Hyperlipidemia    • Hypertension    • [facility] 12/22/2019   • Prostate CA ([facility] )    • S/P CABG x 1     2004     Past Surgical History:   Procedure Laterality Date   • CORONARY ARTERY BYPASS GRAFT  2004   • [place] ARTHRODESIS ANT INTERBODY MIN DISCECTOMY, CERVICAL BELOW C2 N/A 12/16/2020    Procedure: Anterior cervical discectomy with fusion C4-C7;  Posterior cervical decompression and fusion C2-T2;  Surgeon: Tang Livingston MD;  Location: [facility];  Service: Neurosurgery   • TONSILLECTOMY       Social History   Social History     Substance and Sexual Activity   Alcohol Use Yes   • Alcohol/week: 10 0 standard drinks   • Types: 10 Shots of liquor per week    Comment: last drink friday     Social History     Substance and Sexual Activity   Drug Use No     Social History     Tobacco Use   Smoking Status Current Every Day Smoker   • Packs/day: 1 50   • Years: 40 00   • Pack years: 60 00   • Types: Cigarettes   Smokeless Tobacco Never Used     E-Cigarette/Vaping   • E-Cigarette Use Never User       E-Cigarette/Vaping Substances   • Nicotine Yes    • THC No    • CBD No    • Flavoring No    • Other No    • Unknown No      Family History: non-contributory    Meds/Allergies   all medications and allergies reviewed  No Known Allergies    Objective   Vitals: Blood pressure 111/59, pulse 100, temperature 98 5 °F (36 9 °C), resp  rate 18, height 6' (1 829 m), weight 87 1 kg (192 lb), SpO2 94 %  Intake/Output Summary (Last 24 hours) at 10/10/2022 2159  Last data filed at 10/10/2022 2110  Gross per 24 hour   Intake 1000 ml   Output --   Net 1000 ml       Invasive Devices  Report    Peripheral Intravenous Line  Duration           Peripheral IV 10/10/22 Left Antecubital <1 day                Physical Exam  HENT:      Head:      Comments: Nasal fracture, swelling and ecchymosis of the nasal bridge and bilateral orbits  Nose:      Comments: Nasal bridge ecchymosis and swelling  Eyes:      Comments: Right eye orbit ecchymosis and swelling  Left eye orbit ecchymosis  Cardiovascular:      Rate and Rhythm: Normal rate and regular rhythm  Pulses: Normal pulses  Heart sounds: Normal heart sounds  Pulmonary:      Effort: Pulmonary effort is normal       Breath sounds: Normal breath sounds  Abdominal:      General: Bowel sounds are normal    Skin:     General: Skin is warm  Capillary Refill: Capillary refill takes less than 2 seconds  Neurological:      Mental Status: He is alert and oriented to person, place, and time  Sensory: Sensation is intact  Motor: Tremor present  Coordination: Finger-Nose-Finger Test abnormal       Gait: Abnormal gait: Unable to assess  Deep Tendon Reflexes: Reflexes are normal and symmetric  Lab Results: I have personally reviewed pertinent reports  Imaging: I have personally reviewed pertinent reports  EKG, Pathology, and Other Studies: I have personally reviewed pertinent reports  Code Status: Level 1 - Full Code  Advance Directive and Living Will:      Power of :    POLST:      Counseling / Coordination of Care  Total floor / unit time spent today 45 minutes  Greater than 50% of total time was spent with the patient and / or family counseling and / or coordination of care    A description of the counseling / coordination of care: Inpatient care

## 2022-10-11 NOTE — ASSESSMENT & PLAN NOTE
Chronic, stable     Lab Results   Component Value Date    HGBA1C 5 1 04/09/2022       Recent Labs     10/11/22  0034 10/11/22  0735   POCGLU 106 90     · Insulin sliding scale  · Hold home metformin setting of acute on chronic renal failure

## 2022-10-11 NOTE — PLAN OF CARE
Problem: Potential for Falls  Goal: Patient will remain free of falls  Description: INTERVENTIONS:  - Educate patient/family on patient safety including physical limitations  - Instruct patient to call for assistance with activity   - Consult OT/PT to assist with strengthening/mobility   - Keep Call bell within reach  - Keep bed low and locked with side rails adjusted as appropriate  - Keep care items and personal belongings within reach  - Initiate and maintain comfort rounds  - Make Fall Risk Sign visible to staff  - Offer Toileting every   Hours, in advance of need  - Initiate/Maintain  alarm  - Obtain necessary fall risk management equipment:      - Apply yellow socks and bracelet for high fall risk patients  - Consider moving patient to room near nurses station  Outcome: Progressing     Problem: Nutrition/Hydration-ADULT  Goal: Nutrient/Hydration intake appropriate for improving, restoring or maintaining nutritional needs  Description: Monitor and assess patient's nutrition/hydration status for malnutrition  Collaborate with interdisciplinary team and initiate plan and interventions as ordered  Monitor patient's weight and dietary intake as ordered or per policy  Utilize nutrition screening tool and intervene as necessary  Determine patient's food preferences and provide high-protein, high-caloric foods as appropriate       INTERVENTIONS:  - Monitor oral intake, urinary output, labs, and treatment plans  - Assess nutrition and hydration status and recommend course of action  - Evaluate amount of meals eaten  - Assist patient with eating if necessary   - Allow adequate time for meals  - Recommend/ encourage appropriate diets, oral nutritional supplements, and vitamin/mineral supplements  - Order, calculate, and assess calorie counts as needed  - Recommend, monitor, and adjust tube feedings and TPN/PPN based on assessed needs  - Assess need for intravenous fluids  - Provide specific nutrition/hydration education as appropriate  - Include patient/family/caregiver in decisions related to nutrition  Outcome: Progressing     Problem: NEUROSENSORY - ADULT  Goal: Achieves stable or improved neurological status  Description: INTERVENTIONS  - Monitor and report changes in neurological status  - Monitor vital signs such as temperature, blood pressure, glucose, and any other labs ordered   - Initiate measures to prevent increased intracranial pressure  - Monitor for seizure activity and implement precautions if appropriate      Outcome: Progressing  Goal: Remains free of injury related to seizures activity  Description: INTERVENTIONS  - Maintain airway, patient safety  and administer oxygen as ordered  - Monitor patient for seizure activity, document and report duration and description of seizure to physician/advanced practitioner  - If seizure occurs,  ensure patient safety during seizure  - Reorient patient post seizure  - Seizure pads on all 4 side rails  - Instruct patient/family to notify RN of any seizure activity including if an aura is experienced  - Instruct patient/family to call for assistance with activity based on nursing assessment  - Administer anti-seizure medications if ordered    Outcome: Progressing  Goal: Achieves maximal functionality and self care  Description: INTERVENTIONS  - Monitor swallowing and airway patency with patient fatigue and changes in neurological status  - Encourage and assist patient to increase activity and self care     - Encourage visually impaired, hearing impaired and aphasic patients to use assistive/communication devices  Outcome: Progressing     Problem: CARDIOVASCULAR - ADULT  Goal: Maintains optimal cardiac output and hemodynamic stability  Description: INTERVENTIONS:  - Monitor I/O, vital signs and rhythm  - Monitor for S/S and trends of decreased cardiac output  - Administer and titrate ordered vasoactive medications to optimize hemodynamic stability  - Assess quality of pulses, skin color and temperature  - Assess for signs of decreased coronary artery perfusion  - Instruct patient to report change in severity of symptoms  Outcome: Progressing  Goal: Absence of cardiac dysrhythmias or at baseline rhythm  Description: INTERVENTIONS:  - Continuous cardiac monitoring, vital signs, obtain 12 lead EKG if ordered  - Administer antiarrhythmic and heart rate control medications as ordered  - Monitor electrolytes and administer replacement therapy as ordered  Outcome: Progressing

## 2022-10-11 NOTE — ASSESSMENT & PLAN NOTE
Acute on Chronic     Lab Results   Component Value Date    EGFR 22 10/10/2022    EGFR 57 04/11/2022    EGFR 46 04/10/2022    CREATININE 2 95 (H) 10/10/2022    CREATININE 1 34 (H) 04/11/2022    CREATININE 1 59 (H) 04/10/2022   Baseline creatinine ranges from 1 3 to 1 5    Creatinine elevated on admission at 2 95 -> 1 12 (10/11)  · Monitor inputs and outputs  · Hold/avoid nephrotoxic medication  · Trend BMP daily

## 2022-10-11 NOTE — ASSESSMENT & PLAN NOTE
History of paroxysmal a-fib  · Continue home medications  · On telemetry for multiple electrolyte abnormalities

## 2022-10-11 NOTE — QUICK NOTE
Patient complained of low back pain  States the pain is an 8/10  There are no alleviating factors  Patient states that pain increased while moving  There is no point tenderness on the spine or paraspinal muscles  Pt given tylenol 975 and lidocaine patch

## 2022-10-11 NOTE — SPEECH THERAPY NOTE
Speech-Language Pathology Bedside Swallow Evaluation      Patient Name: Radha King    QLFFU'I Date: 10/11/2022     Problem List  Principal Problem:    Alcohol withdrawal ([facility] )  Active Problems:    COPD (chronic obstructive pulmonary disease) (HCC)    Type 2 diabetes mellitus (HCC)    Hypomagnesemia    CAD (coronary artery disease)    Thrombocytopenia (HCC)    Alcohol abuse    Hyponatremia    Chronic heart failure with preserved ejection fraction (HCC)    History of Atrial fibrillation ([facility] )    Acute on chronic kidney failure ([facility] )    Nasal bone fracture      Past Medical History  Past Medical History:   Diagnosis Date   • Cancer ([facility] )     prostate ca,had radiation   • Cardiac disease     stents,then triple bypass   • COPD (chronic obstructive pulmonary disease) ([facility] )    • ETOH abuse    • Hx of heart artery stent     2014   • Hyperlipidemia    • Hypertension    • [facility] 12/22/2019   • Prostate CA ([facility] )    • S/P CABG x 1     2004       Past Surgical History  Past Surgical History:   Procedure Laterality Date   • CORONARY ARTERY BYPASS GRAFT  2004   • [place] ARTHRODESIS ANT INTERBODY MIN DISCECTOMY, CERVICAL BELOW C2 N/A 12/16/2020    Procedure: Anterior cervical discectomy with fusion C4-C7; Posterior cervical decompression and fusion C2-T2;  Surgeon: Saintclair Sports, MD;  Location: [facility];  Service: Neurosurgery   • TONSILLECTOMY         Summary   Pt presented with functional appearing oral and pharyngeal stage swallowing skills with materials administered today  Current Medical Status  Radha King is a 62 yo M c PMH as stated above who presented 10/9 s/p fall (admitted ~24 hrs s/p fall c ETOH level of 39, electrolyte abnormalities with a sodium of 123, low magnesium) with nasal bone fx  DX:  Alcohol withdrawal, hyponatremia, hypomagnesemia, COPD, nasal bone fracture, ENMA on CKD, CHF, CAD,  alcohol abuse, thrombocytopenia, T2 DM  SLP Swallowing Evaluation ordered at this time  Current Precautions:  Fall, Seizure    Allergies:  No known food allergies    Past medical history:  Please see H&P for details    Special Studies of 10/10:  CT of facial bones: Minimally displaced, nondepressed distal nasal bone fracture, slightly to the left of midline  CT of cervical spine: No cervical spine fracture or traumatic malalignment  CT of head: No acute intracranial abnormality  See full report for other non-emergent findings    Social/Education/Vocational Hx:  Pt lives alone    Swallow Information   Current Risks for Dysphagia & Aspiration: r/o  AMS 2* alcohol withdrawal  Current Diet: regular diet and thin liquids   Baseline Diet: regular diet and thin liquids      Baseline Assessment   Behavior/Cognition: alert  Speech/Language Status: able to participate in basic conversation  Patient Positioning: upright in bed       Swallow Mechanism Exam  Facial: symmetrical  Labial: WFL  Lingual: WFL  Velum: symmetrical  Mandible: adequate ROM  Dentition: adequate  Vocal quality:clear/adequate   Respiratory Status: on RA       Consistencies Assessed and Performance  Materials administered included water by individual and successive sips (pt had already consumed meat loaf, potatoes prior to my arrival   Pt and staff denied s/s food dysphagia  Oral Stage:   Adequate appearing retrieval, b olus formation and transfer   No overt s/s reduced oral control  Pharyngeal Stage:   Swallow Mechanics:  Swallowing initiation appeared prompt  Laryngeal rise was palpated and judged to be within functional limits  No coughing, throat clearing, change in vocal quality or respiratory status noted today  Esophageal Concerns: none reported    Summary and Recommendations:  Pt alert  No oral/pharygneal dysphagia noted or reported      Results Reviewed with: patient and RN     Treatment Recommended: None at this time    Gareth De Los Santos [address] [place] [address] [id number]  [place] License [id number]  Available via [facility] Text

## 2022-10-11 NOTE — ASSESSMENT & PLAN NOTE
Acute   Admission Na 123  Likely secondary to dehydration due to diarrhea/vomiting and poor PO intake    · Received 1L NS bolus in the ER  · Repeat BMP after 4 hours - 121  · Monitor sodium: 121>125>126>129>131>129>130  · IVF NS 100ml/hr   · Avoid overcorrecting Na too quickly - should be corrected at rate of <0 5 per hour  · Euvolimic hyponatremia  · Urine Osmolality: 308  · Urine sodium: 88  · Possible SIADH   · Flood restriction under 800 ml a day

## 2022-10-11 NOTE — ASSESSMENT & PLAN NOTE
Acute   Magnesium on admission at 1 2, likely secondary to GI losses with chronic alcohol use    · Received 2 gm of magnesium sulfate in the ER  · Trend daily  · 1 8 10/11  · Replete as necessary

## 2022-10-11 NOTE — ASSESSMENT & PLAN NOTE
Acute   Ethanol level on admission was 39, last drink was on 10/09 at 7:00 p m  Patient endorses daily drinking  · Continue thiamine, folic acid    · UDS  · Fall precautions  · Seizure precautions  · On CIWA protocol  · Pt is still has tremors (CIWA 5)  · Restarted Librium taper at 50 mg on 10/14 followed by 25 mg on 10/15 due to CIWA increasing

## 2022-10-11 NOTE — PROGRESS NOTES
Daily Progress Note - [facility]  Family Medicine Residency  Vesna Diaz 63 y o  male MRN: [id number]  Unit/Bed#: [address] Encounter: [id number]  Admitting Physician: Kishore Castillo DO   PCP: Wilfrido Muñoz MD  Date of Admission:  10/10/2022  5:33 PM    Assessment and Plan    * Alcohol withdrawal ([facility] [address] )  Assessment & Plan  Acute   Ethanol level on admission was 39, last drink was yesterday on 10/09 at 7:00 p m  Patient endorses daily drinking  · Continue thiamine, folic acid  · UDS  · Fall precautions  · Seizure precautions  · On CIWA protocol  · Librium 50 mg Q8H, tapered as per CIWA    Nasal bone fracture  Assessment & Plan  Acute   Patient presented after a fall that occurred on 10/09  CT scan showed nasal bone fracture  · ENT consulted     Acute on chronic kidney failure [facility])  Assessment & Plan  Acute on Chronic     Lab Results   Component Value Date    EGFR 22 10/10/2022    EGFR 57 04/11/2022    EGFR 46 04/10/2022    CREATININE 2 95 (H) 10/10/2022    CREATININE 1 34 (H) 04/11/2022    CREATININE 1 59 (H) 04/10/2022   Baseline creatinine ranges from 1 3 to 1 5  Creatinine elevated on admission at 2 95   · Monitor inputs and outputs  · Hold/avoid nephrotoxic medication  · Trend BMP daily    History of Atrial fibrillation (HCC)  Assessment & Plan  History of paroxysmal a-fib  · Continue home medications  · On telemetry for multiple electrolyte abnormalities  Chronic heart failure with preserved ejection fraction [facility])  Assessment & Plan  Chronic, stable   Wt Readings from Last 3 Encounters:   10/10/22 87 1 kg (192 lb)   08/16/22 87 1 kg (192 lb)   06/27/22 84 6 kg (186 lb 9 6 oz)     Last echo done on 12/2020 showed EF 55-60%, G1DD  · Continue home medications, hold nephrotoxic drugs  · Daily weights, monitor I/O  Hyponatremia  Assessment & Plan  Acute   Admission Na 123  Likely secondary to dehydration due to diarrhea/vomiting and poor PO intake    · Received 1L NS bolus in the ER  · Repeat BMP after 4 hours - 121  · Monitor sodium  · IVF NS 100ml/hr   · Avoid overcorrecting Na too quickly - should be corrected at rate of <0 5 per hour     Alcohol abuse  Assessment & Plan  Chronic   Ethanol level on admission was 39, last drink was yesterday on 10/09 at 7:00 p m  Patient endorses daily drinking  · Continue thiamine, folic acid  · UDS - Negative     Thrombocytopenia (HCC)  Assessment & Plan  Acute on Chronic   · Platelets on admission 89  · Patient has a long history of thrombocytopenia likely secondary to alcohol abuse  CAD (coronary artery disease)  Assessment & Plan  Chronic   Hx of CAD s/p CABG 2004, PCI to LM 2014  · Not on eliquis secondary to history of falls, chronic alcohol use, thrombocytopenia, increased risk of bleeding    Hypomagnesemia  Assessment & Plan  Acute   Magnesium on admission at 1 2, likely secondary to GI losses with chronic alcohol use    · Received 2 gm of magnesium sulfate in the ER  · Trend daily  · Replete as necessary    Type 2 diabetes mellitus [facility])  Assessment & Plan  Chronic, stable     Lab Results   Component Value Date    HGBA1C 5 1 04/09/2022       Recent Labs     10/11/22  0034 10/11/22  0735   POCGLU 106 90     · Insulin sliding scale  · Hold home metformin setting of acute on chronic renal failure  COPD (chronic obstructive pulmonary disease) ([facility])  Assessment & Plan  Chronic, stable  · Continue home inhalers breo-ellipta and albuterol  · Duo-nebs q6h PRN  VTE Pharmacologic Prophylaxis:   Pharmacologic: Held due to thrombocytopenia  Mechanical VTE Prophylaxis in Place: Yes    Time Spent for Care: 30 minutes  More than 50% of total time spent on counseling and coordination of care as described above      Current Length of Stay: 1 day(s)    Current Patient Status: Inpatient   Certification Statement: The patient will continue to require additional inpatient hospital stay due to alcohol withdrawal    Code Status: Level 1 - Full Code    Subjective:   Pt was seen and examined at bedside  He reports nausea, headaches, tremors, feels shakey, and still has back pain  Pt also reports blurry vision in the right eye  He has a history of cataracts and vision was blurry prior to fall, but pt has stated this has gotten worse since the fall  Pt received librium 90 minutes prior to evaluation and is sleepy on examination  He denies vomiting, chest pain, and shortness of breath at this time  Nursing reports no acute events overnight  Objective     Objective:   Vitals:   Temp (24hrs), Av 9 °F (37 2 °C), Min:98 5 °F (36 9 °C), Max:99 4 °F (37 4 °C)    Temp:  [98 5 °F (36 9 °C)-99 4 °F (37 4 °C)] 98 8 °F (37 1 °C)  HR:  [] 90  Resp:  [13-18] 18  BP: (111-145)/(59-77) 124/68  SpO2:  [91 %-96 %] 91 %  Body mass index is 25 36 kg/m²  Input and Output Summary (last 24 hours): Intake/Output Summary (Last 24 hours) at 10/11/2022 0954  Last data filed at 10/11/2022 0802  Gross per 24 hour   Intake 1050 ml   Output 925 ml   Net 125 ml       Physical Exam:   Physical Exam  Constitutional:       Appearance: He is normal weight  HENT:      Head:      Comments: Nasal fracture, swelling and ecchymosis of the nasal bridge and bilateral orbits  Nose:      Comments: Nasal bridge ecchymosis and swelling  Eyes:      General: No scleral icterus  Extraocular Movements: Extraocular movements intact  Conjunctiva/sclera: Conjunctivae normal       Comments: Right eye orbit ecchymosis and swelling  Left eye orbit ecchymosis  Cardiovascular:      Rate and Rhythm: Regular rhythm  Tachycardia present  Pulses: Normal pulses  Heart sounds: Normal heart sounds  Abdominal:      General: Abdomen is flat  Bowel sounds are normal       Palpations: Abdomen is soft  Tenderness: There is no abdominal tenderness  Musculoskeletal:      Right lower leg: No edema  Left lower leg: No edema  Skin:     General: Skin is warm  Neurological:      Mental Status: He is lethargic  Psychiatric:         Mood and Affect: Mood normal          Behavior: Behavior normal        Additional Data:     Labs:  Results from last 7 days   Lab Units 10/11/22  0501   WBC Thousand/uL 5 12   HEMOGLOBIN g/dL 12 7   HEMATOCRIT % 36 3*   PLATELETS Thousands/uL 79*   NEUTROS PCT % 61   LYMPHS PCT % 20   MONOS PCT % 15*   EOS PCT % 2     Results from last 7 days   Lab Units 10/11/22  0501 10/11/22  0156 10/10/22  1922   POTASSIUM mmol/L 5 0   < > 5 2   CHLORIDE mmol/L 88*   < > 84*   CO2 mmol/L 26   < > 22   BUN mg/dL 24   < > 21   CREATININE mg/dL 1 96*   < > 2 95*   CALCIUM mg/dL 8 4   < > 8 5   ALK PHOS U/L  --   --  86   ALT U/L  --   --  52   AST U/L  --   --  66*    < > = values in this interval not displayed  Results from last 7 days   Lab Units 10/10/22  1839   INR  0 92     Results from last 7 days   Lab Units 10/11/22  0735 10/11/22  0034   POC GLUCOSE mg/dl 90 106           * I Have Reviewed All Lab Data Listed Above  * Additional Pertinent Lab Tests Reviewed:  All Labs Within Last 24 Hours Reviewed    Imaging:    Imaging Reports Reviewed Today Include: CT facial bones, CT spine cervical, CT head     Last 24 Hours Medication List:   Current Facility-Administered Medications   Medication Dose Route Frequency Provider Last Rate   • acetaminophen  975 mg Oral Q8H PRN Lenard Meigs, MD     • albuterol  2 puff Inhalation Q4H PRN Lenard Meigs, MD     • chlordiazePOXIDE  50 mg Oral Q8H [facility] Julieta Erickson MD     • ferrous sulfate  325 mg Oral Daily With Breakfast Lenard Meigs, MD     • fluticasone-vilanterol  1 puff Inhalation Daily Lenard Meigs, MD     • folic acid  1 mg Oral Daily Cameron Norris MD     • insulin lispro  1-5 Units Subcutaneous 4x Daily (AC & HS) Cameron Norris MD     • lidocaine  1 patch Topical Daily Cameron Norris MD     • magnesium oxide  400 mg Oral BID Joselito Isidro MD     • metoprolol tartrate  12 5 mg Oral Q12H Joselito Isidro MD     • multivitamin-minerals  1 tablet Oral Daily Samra Freeman MD     • nicotine  1 patch Transdermal Daily Joselito Isidro MD     • pravastatin  40 mg Oral Daily With Rhonda Howell MD     • ranolazine  500 mg Oral Q12H Joselito Isidro MD     • sodium chloride  100 mL/hr Intravenous Continuous Samra Freeman  mL/hr (10/11/22 5849)   • tamsulosin  0 4 mg Oral Daily Joselito Isidro MD     • thiamine  100 mg Oral Daily Joselito Isidro MD               ** Please Note: Dictation voice to text software may have been used in the creation of this document   **    Lois Kitchen  10/11/22  9:54 AM

## 2022-10-11 NOTE — ASSESSMENT & PLAN NOTE
Chronic   Ethanol level on admission was 39, last drink was yesterday on 10/09 at 7:00 p m  Patient endorses daily drinking  · Continue thiamine, folic acid    · UDS - Negative

## 2022-10-11 NOTE — ASSESSMENT & PLAN NOTE
Acute   Patient presented after a fall that occurred on 10/09  CT scan showed nasal bone fracture      · ENT consulted - recommended to follow up outpatient

## 2022-10-11 NOTE — ASSESSMENT & PLAN NOTE
Chronic, stable   Wt Readings from Last 3 Encounters:   10/10/22 87 1 kg (192 lb)   08/16/22 87 1 kg (192 lb)   06/27/22 84 6 kg (186 lb 9 6 oz)     Last echo done on 12/2020 showed EF 55-60%, G1DD  · Continue home medications, hold nephrotoxic drugs  · Daily weights, monitor I/O     · Amlodipine 2 5 mg added on 10/11

## 2022-10-11 NOTE — ASSESSMENT & PLAN NOTE
Chronic   Hx of CAD s/p CABG 2004, PCI to LM 2014  · Not on eliquis secondary to history of falls, chronic alcohol use, thrombocytopenia, increased risk of bleeding

## 2022-10-11 NOTE — PROGRESS NOTES
ADT Alert received and patient was admitted to IP on 10/10 with ETOH withdrawal     OPCM RN to follow

## 2022-10-11 NOTE — PLAN OF CARE
Problem: Potential for Falls  Goal: Patient will remain free of falls  Description: INTERVENTIONS:  - Educate patient/family on patient safety including physical limitations  - Instruct patient to call for assistance with activity   - Consult OT/PT to assist with strengthening/mobility   - Keep Call bell within reach  - Keep bed low and locked with side rails adjusted as appropriate  - Keep care items and personal belongings within reach  - Initiate and maintain comfort rounds  - Make Fall Risk Sign visible to staff  - Offer Toileting every 2 Hours, in advance of need  - Initiate/Maintain bed alarm  - Obtain necessary fall risk management equipment:   Problem: Nutrition/Hydration-ADULT  Goal: Nutrient/Hydration intake appropriate for improving, restoring or maintaining nutritional needs  Description: Monitor and assess patient's nutrition/hydration status for malnutrition  Collaborate with interdisciplinary team and initiate plan and interventions as ordered  Monitor patient's weight and dietary intake as ordered or per policy  Utilize nutrition screening tool and intervene as necessary  Determine patient's food preferences and provide high-protein, high-caloric foods as appropriate       INTERVENTIONS:  - Monitor oral intake, urinary output, labs, and treatment plans  - Assess nutrition and hydration status and recommend course of action  - Evaluate amount of meals eaten  - Assist patient with eating if necessary   - Allow adequate time for meals  - Recommend/ encourage appropriate diets, oral nutritional supplements, and vitamin/mineral supplements  - Order, calculate, and assess calorie counts as needed  - Recommend, monitor, and adjust tube feedings and TPN/PPN based on assessed needs  - Assess need for intravenous fluids  - Provide specific nutrition/hydration education as appropriate  - Include patient/family/caregiver in decisions related to nutrition  Outcome: Progressing     Problem: NEUROSENSORY - ADULT  Goal: Achieves stable or improved neurological status  Description: INTERVENTIONS  - Monitor and report changes in neurological status  - Monitor vital signs such as temperature, blood pressure, glucose, and any other labs ordered   - Initiate measures to prevent increased intracranial pressure  - Monitor for seizure activity and implement precautions if appropriate      Outcome: Progressing  Goal: Remains free of injury related to seizures activity  Description: INTERVENTIONS  - Maintain airway, patient safety  and administer oxygen as ordered  - Monitor patient for seizure activity, document and report duration and description of seizure to physician/advanced practitioner  - If seizure occurs,  ensure patient safety during seizure  - Reorient patient post seizure  - Seizure pads on all 4 side rails  - Instruct patient/family to notify RN of any seizure activity including if an aura is experienced  - Instruct patient/family to call for assistance with activity based on nursing assessment  - Administer anti-seizure medications if ordered    Outcome: Progressing  Goal: Achieves maximal functionality and self care  Description: INTERVENTIONS  - Monitor swallowing and airway patency with patient fatigue and changes in neurological status  - Encourage and assist patient to increase activity and self care     - Encourage visually impaired, hearing impaired and aphasic patients to use assistive/communication devices  Outcome: Progressing     Problem: CARDIOVASCULAR - ADULT  Goal: Maintains optimal cardiac output and hemodynamic stability  Description: INTERVENTIONS:  - Monitor I/O, vital signs and rhythm  - Monitor for S/S and trends of decreased cardiac output  - Administer and titrate ordered vasoactive medications to optimize hemodynamic stability  - Assess quality of pulses, skin color and temperature  - Assess for signs of decreased coronary artery perfusion  - Instruct patient to report change in severity of symptoms  Outcome: Progressing  Goal: Absence of cardiac dysrhythmias or at baseline rhythm  Description: INTERVENTIONS:  - Continuous cardiac monitoring, vital signs, obtain 12 lead EKG if ordered  - Administer antiarrhythmic and heart rate control medications as ordered  - Monitor electrolytes and administer replacement therapy as ordered  Outcome: Progressing     - Apply yellow socks and bracelet for high fall risk patients  - Consider moving patient to room near nurses station  Outcome: Progressing

## 2022-10-12 ENCOUNTER — APPOINTMENT (OUTPATIENT)
Dept: RADIOLOGY | Facility: HOSPITAL | Age: 62
DRG: 155 | End: 2022-10-12
Payer: MEDICARE

## 2022-10-12 LAB
ANION GAP SERPL CALCULATED.3IONS-SCNC: 10 MMOL/L (ref 4–13)
ANION GAP SERPL CALCULATED.3IONS-SCNC: 7 MMOL/L (ref 4–13)
BUN SERPL-MCNC: 23 MG/DL (ref 5–25)
BUN SERPL-MCNC: 24 MG/DL (ref 5–25)
CALCIUM SERPL-MCNC: 8.4 MG/DL (ref 8.3–10.1)
CALCIUM SERPL-MCNC: 9.1 MG/DL (ref 8.3–10.1)
CHLORIDE SERPL-SCNC: 93 MMOL/L (ref 96–108)
CHLORIDE SERPL-SCNC: 97 MMOL/L (ref 96–108)
CO2 SERPL-SCNC: 26 MMOL/L (ref 21–32)
CO2 SERPL-SCNC: 27 MMOL/L (ref 21–32)
CREAT SERPL-MCNC: 1.35 MG/DL (ref 0.6–1.3)
CREAT SERPL-MCNC: 1.36 MG/DL (ref 0.6–1.3)
GFR SERPL CREATININE-BSD FRML MDRD: 56 ML/MIN/1.73SQ M
GFR SERPL CREATININE-BSD FRML MDRD: 56 ML/MIN/1.73SQ M
GLUCOSE SERPL-MCNC: 103 MG/DL (ref 65–140)
GLUCOSE SERPL-MCNC: 111 MG/DL (ref 65–140)
GLUCOSE SERPL-MCNC: 112 MG/DL (ref 65–140)
GLUCOSE SERPL-MCNC: 115 MG/DL (ref 65–140)
GLUCOSE SERPL-MCNC: 89 MG/DL (ref 65–140)
GLUCOSE SERPL-MCNC: 92 MG/DL (ref 65–140)
GLUCOSE SERPL-MCNC: 94 MG/DL (ref 65–140)
POTASSIUM SERPL-SCNC: 5.2 MMOL/L (ref 3.5–5.3)
POTASSIUM SERPL-SCNC: 5.2 MMOL/L (ref 3.5–5.3)
SODIUM SERPL-SCNC: 129 MMOL/L (ref 135–147)
SODIUM SERPL-SCNC: 131 MMOL/L (ref 135–147)

## 2022-10-12 PROCEDURE — 84300 ASSAY OF URINE SODIUM: CPT | Performed by: STUDENT IN AN ORGANIZED HEALTH CARE EDUCATION/TRAINING PROGRAM

## 2022-10-12 PROCEDURE — 80048 BASIC METABOLIC PNL TOTAL CA: CPT

## 2022-10-12 PROCEDURE — 72100 X-RAY EXAM L-S SPINE 2/3 VWS: CPT

## 2022-10-12 PROCEDURE — 80048 BASIC METABOLIC PNL TOTAL CA: CPT | Performed by: STUDENT IN AN ORGANIZED HEALTH CARE EDUCATION/TRAINING PROGRAM

## 2022-10-12 PROCEDURE — 83935 ASSAY OF URINE OSMOLALITY: CPT | Performed by: STUDENT IN AN ORGANIZED HEALTH CARE EDUCATION/TRAINING PROGRAM

## 2022-10-12 PROCEDURE — 99232 SBSQ HOSP IP/OBS MODERATE 35: CPT | Performed by: STUDENT IN AN ORGANIZED HEALTH CARE EDUCATION/TRAINING PROGRAM

## 2022-10-12 PROCEDURE — 82948 REAGENT STRIP/BLOOD GLUCOSE: CPT

## 2022-10-12 RX ORDER — LABETALOL HYDROCHLORIDE 5 MG/ML
5 INJECTION, SOLUTION INTRAVENOUS ONCE
Status: COMPLETED | OUTPATIENT
Start: 2022-10-12 | End: 2022-10-12

## 2022-10-12 RX ADMIN — MAGNESIUM OXIDE TAB 400 MG (241.3 MG ELEMENTAL MG) 400 MG: 400 (241.3 MG) TAB at 08:00

## 2022-10-12 RX ADMIN — THIAMINE HCL TAB 100 MG 100 MG: 100 TAB at 08:00

## 2022-10-12 RX ADMIN — FOLIC ACID 1 MG: 1 TABLET ORAL at 08:00

## 2022-10-12 RX ADMIN — CHLORDIAZEPOXIDE HYDROCHLORIDE 15 MG: 5 CAPSULE ORAL at 21:29

## 2022-10-12 RX ADMIN — METOPROLOL TARTRATE 12.5 MG: 25 TABLET, FILM COATED ORAL at 09:22

## 2022-10-12 RX ADMIN — AMLODIPINE BESYLATE 2.5 MG: 2.5 TABLET ORAL at 08:00

## 2022-10-12 RX ADMIN — CHLORDIAZEPOXIDE HYDROCHLORIDE 25 MG: 25 CAPSULE ORAL at 13:27

## 2022-10-12 RX ADMIN — FERROUS SULFATE TAB 325 MG (65 MG ELEMENTAL FE) 325 MG: 325 (65 FE) TAB at 07:54

## 2022-10-12 RX ADMIN — ACETAMINOPHEN 975 MG: 325 TABLET, FILM COATED ORAL at 01:42

## 2022-10-12 RX ADMIN — LIDOCAINE 5% 1 PATCH: 700 PATCH TOPICAL at 08:04

## 2022-10-12 RX ADMIN — METOPROLOL TARTRATE 12.5 MG: 25 TABLET, FILM COATED ORAL at 21:29

## 2022-10-12 RX ADMIN — LABETALOL HYDROCHLORIDE 5 MG: 5 INJECTION, SOLUTION INTRAVENOUS at 21:39

## 2022-10-12 RX ADMIN — NICOTINE 1 PATCH: 21 PATCH, EXTENDED RELEASE TRANSDERMAL at 08:12

## 2022-10-12 RX ADMIN — FLUTICASONE FUROATE AND VILANTEROL TRIFENATATE 1 PUFF: 200; 25 POWDER RESPIRATORY (INHALATION) at 08:15

## 2022-10-12 RX ADMIN — RANOLAZINE 500 MG: 500 TABLET, EXTENDED RELEASE ORAL at 09:22

## 2022-10-12 RX ADMIN — MAGNESIUM OXIDE TAB 400 MG (241.3 MG ELEMENTAL MG) 400 MG: 400 (241.3 MG) TAB at 17:06

## 2022-10-12 RX ADMIN — CHLORDIAZEPOXIDE HYDROCHLORIDE 25 MG: 25 CAPSULE ORAL at 05:27

## 2022-10-12 RX ADMIN — TAMSULOSIN HYDROCHLORIDE 0.4 MG: 0.4 CAPSULE ORAL at 08:00

## 2022-10-12 RX ADMIN — RANOLAZINE 500 MG: 500 TABLET, EXTENDED RELEASE ORAL at 21:30

## 2022-10-12 RX ADMIN — Medication 1 TABLET: at 08:00

## 2022-10-12 RX ADMIN — BENZONATATE 100 MG: 100 CAPSULE ORAL at 22:23

## 2022-10-12 RX ADMIN — PRAVASTATIN SODIUM 40 MG: 40 TABLET ORAL at 15:46

## 2022-10-12 RX ADMIN — ACETAMINOPHEN 975 MG: 325 TABLET, FILM COATED ORAL at 21:29

## 2022-10-12 NOTE — PROGRESS NOTES
Daily Progress Note - [address] [facility]  Family Medicine Residency  Radha King 63 y o  male MRN: [id number]  Unit/Bed#: [address] Encounter: [id number]  Admitting Physician: Durga Portillo DO   PCP: Kimberly Bernstein MD  Date of Admission:  10/10/2022  5:33 PM    Assessment and Plan    * Alcohol withdrawal ([facility] [address] )  Assessment & Plan  Acute   Ethanol level on admission was 39, last drink was yesterday on 10/09 at 7:00 p m  Patient endorses daily drinking  · Continue thiamine, folic acid  · UDS  · Fall precautions  · Seizure precautions  · On CIWA protocol  · Librium 25 mg Q8H (10/11), 15 mg Q8H (10/12), 10 mg (10/13) tapered as per CIWA    Hyponatremia  Assessment & Plan  Acute   Admission Na 123  Likely secondary to dehydration due to diarrhea/vomiting and poor PO intake  · Received 1L NS bolus in the ER  · Repeat BMP after 4 hours - 121  · Monitor sodium: 121>125>126>129  · IVF NS 100ml/hr   · Avoid overcorrecting Na too quickly - should be corrected at rate of <0 5 per hour     Hypomagnesemia  Assessment & Plan  Acute   Magnesium on admission at 1 2, likely secondary to GI losses with chronic alcohol use    · Received 2 gm of magnesium sulfate in the ER  · Trend daily  · Replete as necessary    COPD (chronic obstructive pulmonary disease) (HCC)  Assessment & Plan  Chronic, stable  · Continue home inhalers breo-ellipta and albuterol  · Duo-nebs q6h PRN  Nasal bone fracture  Assessment & Plan  Acute   Patient presented after a fall that occurred on 10/09  CT scan showed nasal bone fracture  · ENT consulted - recommended to follow up outpatient     Acute on chronic kidney failure [facility])  Assessment & Plan  Acute on Chronic     Lab Results   Component Value Date    EGFR 22 10/10/2022    EGFR 57 04/11/2022    EGFR 46 04/10/2022    CREATININE 2 95 (H) 10/10/2022    CREATININE 1 34 (H) 04/11/2022    CREATININE 1 59 (H) 04/10/2022   Baseline creatinine ranges from 1 3 to 1 5    Creatinine elevated on admission at 2 95   · Monitor inputs and outputs  · Hold/avoid nephrotoxic medication  · Trend BMP daily    History of Atrial fibrillation [facility])  Assessment & Plan  History of paroxysmal a-fib  · Continue home medications  · On telemetry for multiple electrolyte abnormalities  Chronic heart failure with preserved ejection fraction [facility])  Assessment & Plan  Chronic, stable   Wt Readings from Last 3 Encounters:   10/10/22 87 1 kg (192 lb)   08/16/22 87 1 kg (192 lb)   06/27/22 84 6 kg (186 lb 9 6 oz)     Last echo done on 12/2020 showed EF 55-60%, G1DD  · Continue home medications, hold nephrotoxic drugs  · Daily weights, monitor I/O  · Amlodipine 2 5 mg added on 10/11    Alcohol abuse  Assessment & Plan  Chronic   Ethanol level on admission was 39, last drink was yesterday on 10/09 at 7:00 p m  Patient endorses daily drinking  · Continue thiamine, folic acid  · UDS - Negative     Thrombocytopenia (HCC)  Assessment & Plan  Acute on Chronic   · Platelets on admission 89  · Patient has a long history of thrombocytopenia likely secondary to alcohol abuse  CAD (coronary artery disease)  Assessment & Plan  Chronic   Hx of CAD s/p CABG 2004, PCI to LM 2014  · Not on eliquis secondary to history of falls, chronic alcohol use, thrombocytopenia, increased risk of bleeding    Type 2 diabetes mellitus ([facility] [address] )  Assessment & Plan  Chronic, stable     Lab Results   Component Value Date    HGBA1C 5 1 04/09/2022       Recent Labs     10/11/22  0034 10/11/22  0735   POCGLU 106 90     · Insulin sliding scale  · Hold home metformin setting of acute on chronic renal failure  VTE Pharmacologic Prophylaxis:   Pharmacologic: Held due to thrombocytopenia  Mechanical VTE Prophylaxis in Place: Yes    Time Spent for Care: 30 minutes  More than 50% of total time spent on counseling and coordination of care as described above      Current Length of Stay: 2 day(s)    Current Patient Status: Inpatient   Certification Statement: The patient will continue to require additional inpatient hospital stay due to Alcohol withdrawal and electrolyte abnormality    Discharge Plan:  Pending resolution of electrolyte abnormality; ENT outpatient    Code Status: Level 1 - Full Code    Subjective:   Patient examined at bedside  He reports headache, tremors, back pain, in addition to dry mouth/throat  Additionally he reports vision changes in his right eye  Prior to fall he had a history of cataracts and blurry vision  He denies nausea, vomiting, shortness of breath, or chest pain  Overnight he had elevated blood pressures, and amlodipine 2 5 mg was added to his regimen  Objective     Objective:   Vitals:   Temp (24hrs), Av 6 °F (37 °C), Min:98 1 °F (36 7 °C), Max:98 9 °F (37 2 °C)    Temp:  [98 1 °F (36 7 °C)-98 9 °F (37 2 °C)] 98 8 °F (37 1 °C)  HR:  [77-97] 81  Resp:  [16-18] 16  BP: (122-183)/() 155/90  SpO2:  [91 %-97 %] 94 %  Body mass index is 25 36 kg/m²  Input and Output Summary (last 24 hours): Intake/Output Summary (Last 24 hours) at 10/12/2022 0300  Last data filed at 10/11/2022 0802  Gross per 24 hour   Intake --   Output 625 ml   Net -625 ml       Physical Exam:   Physical Exam  HENT:      Head: Raccoon eyes present  Comments: Nasal bridge fracture, bruising and swelling over the nasal bridge  Cardiovascular:      Rate and Rhythm: Normal rate and regular rhythm  Pulses: Normal pulses  Heart sounds: Normal heart sounds  Pulmonary:      Effort: Pulmonary effort is normal       Breath sounds: Normal breath sounds and air entry  Musculoskeletal:      Cervical back: Normal range of motion and neck supple  Back:       Comments: X-ray pending   Skin:     General: Skin is warm  Neurological:      Mental Status: He is alert  Motor: Tremor present             Additional Data:     Labs:  Results from last 7 days   Lab Units 10/11/22  0501   WBC Thousand/uL 5 12   HEMOGLOBIN g/dL 12  7   HEMATOCRIT % 36 3*   PLATELETS Thousands/uL 79*   NEUTROS PCT % 61   LYMPHS PCT % 20   MONOS PCT % 15*   EOS PCT % 2     Results from last 7 days   Lab Units 10/12/22  0141 10/11/22  0156 10/10/22  1922   POTASSIUM mmol/L 5 2   < > 5 2   CHLORIDE mmol/L 93*   < > 84*   CO2 mmol/L 26   < > 22   BUN mg/dL 23   < > 21   CREATININE mg/dL 1 36*   < > 2 95*   CALCIUM mg/dL 8 4   < > 8 5   ALK PHOS U/L  --   --  86   ALT U/L  --   --  52   AST U/L  --   --  66*    < > = values in this interval not displayed  Results from last 7 days   Lab Units 10/10/22  1839   INR  0 92     Results from last 7 days   Lab Units 10/11/22  2059 10/11/22  1611 10/11/22  1124 10/11/22  0735 10/11/22  0034   POC GLUCOSE mg/dl 142* 110 125 90 106           * I Have Reviewed All Lab Data Listed Above  * Additional Pertinent Lab Tests Reviewed:  All Labs Within Last 24 Hours Reviewed    Imaging:    Imaging Reports Reviewed Today Include:  No new imaging available  Imaging Personally Reviewed by Myself Includes:  No new imaging available    Recent Cultures (last 7 days):           Last 24 Hours Medication List:   Current Facility-Administered Medications   Medication Dose Route Frequency Provider Last Rate   • acetaminophen  975 mg Oral Q8H PRN Domenico Newman MD     • albuterol  2 puff Inhalation Q4H PRN Domenico Newman MD     • amLODIPine  2 5 mg Oral Daily Domenico Newman MD     • benzonatate  100 mg Oral TID PRN Domenico Newman MD     • chlordiazePOXIDE  25 mg Oral [facility] Saleem Mccall MD      Followed by   • chlordiazePOXIDE  15 mg Oral [facility] Saleem Mccall MD      Followed by   • [START ON 10/13/2022] chlordiazePOXIDE  10 mg Oral [facility] Saleem Mccall MD      Followed by   • [START ON 10/14/2022] chlordiazePOXIDE  5 mg Oral [facility] Saleem Mccall MD     • ferrous sulfate  325 mg Oral Daily With Breakfast Domenico Newman MD     • fluticasone-vilanterol  1 puff Inhalation Daily Nicky Cummings MD     • folic acid  1 mg Oral Daily Mynor Agrawal MD     • insulin lispro  1-5 Units Subcutaneous 4x Daily (AC & HS) Mynor Agrawal MD     • lidocaine  1 patch Topical Daily Mynor Agrawal MD     • magnesium oxide  400 mg Oral BID Nicky Cummings MD     • metoprolol tartrate  12 5 mg Oral Q12H Nicky Cummings MD     • multivitamin-minerals  1 tablet Oral Daily Mynor Agrawal MD     • nicotine  1 patch Transdermal Daily Nicky Cummings MD     • pravastatin  40 mg Oral Daily With Willy Moya MD     • ranolazine  500 mg Oral Q12H Nicky Cummings MD     • sodium chloride  100 mL/hr Intravenous Continuous Mynor Agrawal  mL/hr (10/11/22 4632)   • tamsulosin  0 4 mg Oral Daily Nicky Cummings MD     • thiamine  100 mg Oral Daily Nicky Cummings MD               ** Please Note: Dictation voice to text software may have been used in the creation of this document   **    Susana Kirk  10/12/22  3:00 AM

## 2022-10-12 NOTE — PLAN OF CARE
Problem: Potential for Falls  Goal: Patient will remain free of falls  Description: INTERVENTIONS:  - Educate patient/family on patient safety including physical limitations  - Instruct patient to call for assistance with activity   - Consult OT/PT to assist with strengthening/mobility   - Keep Call bell within reach  - Keep bed low and locked with side rails adjusted as appropriate  - Keep care items and personal belongings within reach  - Initiate and maintain comfort rounds  - Make Fall Risk Sign visible to staff  - Apply yellow socks and bracelet for high fall risk patients  - Consider moving patient to room near nurses station  Outcome: Progressing     Problem: Nutrition/Hydration-ADULT  Goal: Nutrient/Hydration intake appropriate for improving, restoring or maintaining nutritional needs  Description: Monitor and assess patient's nutrition/hydration status for malnutrition  Collaborate with interdisciplinary team and initiate plan and interventions as ordered  Monitor patient's weight and dietary intake as ordered or per policy  Utilize nutrition screening tool and intervene as necessary  Determine patient's food preferences and provide high-protein, high-caloric foods as appropriate       INTERVENTIONS:  - Monitor oral intake, urinary output, labs, and treatment plans  - Assess nutrition and hydration status and recommend course of action  - Evaluate amount of meals eaten  - Assist patient with eating if necessary   - Allow adequate time for meals  - Recommend/ encourage appropriate diets, oral nutritional supplements, and vitamin/mineral supplements  - Order, calculate, and assess calorie counts as needed  - Recommend, monitor, and adjust tube feedings and TPN/PPN based on assessed needs  - Assess need for intravenous fluids  - Provide specific nutrition/hydration education as appropriate  - Include patient/family/caregiver in decisions related to nutrition  Outcome: Progressing     Problem: NEUROSENSORY - ADULT  Goal: Achieves stable or improved neurological status  Description: INTERVENTIONS  - Monitor and report changes in neurological status  - Monitor vital signs such as temperature, blood pressure, glucose, and any other labs ordered   - Initiate measures to prevent increased intracranial pressure  - Monitor for seizure activity and implement precautions if appropriate      Outcome: Progressing  Goal: Remains free of injury related to seizures activity  Description: INTERVENTIONS  - Maintain airway, patient safety  and administer oxygen as ordered  - Monitor patient for seizure activity, document and report duration and description of seizure to physician/advanced practitioner  - If seizure occurs,  ensure patient safety during seizure  - Reorient patient post seizure  - Seizure pads on all 4 side rails  - Instruct patient/family to notify RN of any seizure activity including if an aura is experienced  - Instruct patient/family to call for assistance with activity based on nursing assessment  - Administer anti-seizure medications if ordered    Outcome: Progressing  Goal: Achieves maximal functionality and self care  Description: INTERVENTIONS  - Monitor swallowing and airway patency with patient fatigue and changes in neurological status  - Encourage and assist patient to increase activity and self care     - Encourage visually impaired, hearing impaired and aphasic patients to use assistive/communication devices  Outcome: Progressing     Problem: CARDIOVASCULAR - ADULT  Goal: Maintains optimal cardiac output and hemodynamic stability  Description: INTERVENTIONS:  - Monitor I/O, vital signs and rhythm  - Monitor for S/S and trends of decreased cardiac output  - Administer and titrate ordered vasoactive medications to optimize hemodynamic stability  - Assess quality of pulses, skin color and temperature  - Assess for signs of decreased coronary artery perfusion  - Instruct patient to report change in severity of symptoms  Outcome: Progressing  Goal: Absence of cardiac dysrhythmias or at baseline rhythm  Description: INTERVENTIONS:  - Continuous cardiac monitoring, vital signs, obtain 12 lead EKG if ordered  - Administer antiarrhythmic and heart rate control medications as ordered  - Monitor electrolytes and administer replacement therapy as ordered  Outcome: Progressing

## 2022-10-12 NOTE — PLAN OF CARE
Problem: Potential for Falls  Goal: Patient will remain free of falls  Description: INTERVENTIONS:  - Educate patient/family on patient safety including physical limitations  - Instruct patient to call for assistance with activity   - Consult OT/PT to assist with strengthening/mobility   - Keep Call bell within reach  - Keep bed low and locked with side rails adjusted as appropriate  - Keep care items and personal belongings within reach  - Initiate and maintain comfort rounds  - Make Fall Risk Sign visible to staff  - Offer Toileting every 2 Hours, in advance of need  - Initiate/Maintain bed alarm  - Obtain necessary fall risk management equipment: alarm  - Apply yellow socks and bracelet for high fall risk patients  - Consider moving patient to room near nurses station  Outcome: Progressing     Problem: Nutrition/Hydration-ADULT  Goal: Nutrient/Hydration intake appropriate for improving, restoring or maintaining nutritional needs  Description: Monitor and assess patient's nutrition/hydration status for malnutrition  Collaborate with interdisciplinary team and initiate plan and interventions as ordered  Monitor patient's weight and dietary intake as ordered or per policy  Utilize nutrition screening tool and intervene as necessary  Determine patient's food preferences and provide high-protein, high-caloric foods as appropriate       INTERVENTIONS:  - Monitor oral intake, urinary output, labs, and treatment plans  - Assess nutrition and hydration status and recommend course of action  - Evaluate amount of meals eaten  - Assist patient with eating if necessary   - Allow adequate time for meals  - Recommend/ encourage appropriate diets, oral nutritional supplements, and vitamin/mineral supplements  - Order, calculate, and assess calorie counts as needed  - Recommend, monitor, and adjust tube feedings and TPN/PPN based on assessed needs  - Assess need for intravenous fluids  - Provide specific nutrition/hydration education as appropriate  - Include patient/family/caregiver in decisions related to nutrition  Outcome: Progressing

## 2022-10-13 PROBLEM — M46.96 LUMBAR SPONDYLITIS (HCC): Status: ACTIVE | Noted: 2022-10-13

## 2022-10-13 LAB
ANION GAP SERPL CALCULATED.3IONS-SCNC: 6 MMOL/L (ref 4–13)
ANION GAP SERPL CALCULATED.3IONS-SCNC: 7 MMOL/L (ref 4–13)
BUN SERPL-MCNC: 18 MG/DL (ref 5–25)
BUN SERPL-MCNC: 23 MG/DL (ref 5–25)
CALCIUM SERPL-MCNC: 8.7 MG/DL (ref 8.3–10.1)
CALCIUM SERPL-MCNC: 8.7 MG/DL (ref 8.3–10.1)
CHLORIDE SERPL-SCNC: 97 MMOL/L (ref 96–108)
CHLORIDE SERPL-SCNC: 98 MMOL/L (ref 96–108)
CO2 SERPL-SCNC: 25 MMOL/L (ref 21–32)
CO2 SERPL-SCNC: 25 MMOL/L (ref 21–32)
CREAT SERPL-MCNC: 1.1 MG/DL (ref 0.6–1.3)
CREAT SERPL-MCNC: 1.12 MG/DL (ref 0.6–1.3)
GFR SERPL CREATININE-BSD FRML MDRD: 71 ML/MIN/1.73SQ M
GFR SERPL CREATININE-BSD FRML MDRD: 72 ML/MIN/1.73SQ M
GLUCOSE SERPL-MCNC: 103 MG/DL (ref 65–140)
GLUCOSE SERPL-MCNC: 107 MG/DL (ref 65–140)
GLUCOSE SERPL-MCNC: 108 MG/DL (ref 65–140)
GLUCOSE SERPL-MCNC: 167 MG/DL (ref 65–140)
GLUCOSE SERPL-MCNC: 180 MG/DL (ref 65–140)
GLUCOSE SERPL-MCNC: 95 MG/DL (ref 65–140)
OSMOLALITY UR: 308 MMOL/KG
POTASSIUM SERPL-SCNC: 4.7 MMOL/L (ref 3.5–5.3)
POTASSIUM SERPL-SCNC: 5.3 MMOL/L (ref 3.5–5.3)
SODIUM 24H UR-SCNC: 88 MOL/L
SODIUM SERPL-SCNC: 129 MMOL/L (ref 135–147)
SODIUM SERPL-SCNC: 129 MMOL/L (ref 135–147)

## 2022-10-13 PROCEDURE — 99232 SBSQ HOSP IP/OBS MODERATE 35: CPT | Performed by: STUDENT IN AN ORGANIZED HEALTH CARE EDUCATION/TRAINING PROGRAM

## 2022-10-13 PROCEDURE — 97530 THERAPEUTIC ACTIVITIES: CPT

## 2022-10-13 PROCEDURE — 80048 BASIC METABOLIC PNL TOTAL CA: CPT

## 2022-10-13 PROCEDURE — 82948 REAGENT STRIP/BLOOD GLUCOSE: CPT

## 2022-10-13 PROCEDURE — 97162 PT EVAL MOD COMPLEX 30 MIN: CPT

## 2022-10-13 RX ORDER — HEPARIN SODIUM 5000 [USP'U]/ML
5000 INJECTION, SOLUTION INTRAVENOUS; SUBCUTANEOUS EVERY 8 HOURS SCHEDULED
Status: DISCONTINUED | OUTPATIENT
Start: 2022-10-13 | End: 2022-10-14 | Stop reason: HOSPADM

## 2022-10-13 RX ADMIN — SODIUM CHLORIDE 100 ML/HR: 0.9 INJECTION, SOLUTION INTRAVENOUS at 02:24

## 2022-10-13 RX ADMIN — HEPARIN SODIUM 5000 UNITS: 5000 INJECTION INTRAVENOUS; SUBCUTANEOUS at 09:00

## 2022-10-13 RX ADMIN — SODIUM CHLORIDE 100 ML/HR: 0.9 INJECTION, SOLUTION INTRAVENOUS at 22:22

## 2022-10-13 RX ADMIN — MAGNESIUM OXIDE TAB 400 MG (241.3 MG ELEMENTAL MG) 400 MG: 400 (241.3 MG) TAB at 17:19

## 2022-10-13 RX ADMIN — NICOTINE 1 PATCH: 21 PATCH, EXTENDED RELEASE TRANSDERMAL at 09:00

## 2022-10-13 RX ADMIN — THIAMINE HCL TAB 100 MG 100 MG: 100 TAB at 08:59

## 2022-10-13 RX ADMIN — Medication 1 TABLET: at 08:59

## 2022-10-13 RX ADMIN — HEPARIN SODIUM 5000 UNITS: 5000 INJECTION INTRAVENOUS; SUBCUTANEOUS at 22:15

## 2022-10-13 RX ADMIN — FERROUS SULFATE TAB 325 MG (65 MG ELEMENTAL FE) 325 MG: 325 (65 FE) TAB at 09:03

## 2022-10-13 RX ADMIN — PRAVASTATIN SODIUM 40 MG: 40 TABLET ORAL at 17:19

## 2022-10-13 RX ADMIN — TAMSULOSIN HYDROCHLORIDE 0.4 MG: 0.4 CAPSULE ORAL at 08:59

## 2022-10-13 RX ADMIN — FOLIC ACID 1 MG: 1 TABLET ORAL at 08:59

## 2022-10-13 RX ADMIN — MAGNESIUM OXIDE TAB 400 MG (241.3 MG ELEMENTAL MG) 400 MG: 400 (241.3 MG) TAB at 08:59

## 2022-10-13 RX ADMIN — FLUTICASONE FUROATE AND VILANTEROL TRIFENATATE 1 PUFF: 200; 25 POWDER RESPIRATORY (INHALATION) at 08:59

## 2022-10-13 RX ADMIN — HEPARIN SODIUM 5000 UNITS: 5000 INJECTION INTRAVENOUS; SUBCUTANEOUS at 14:08

## 2022-10-13 RX ADMIN — INSULIN LISPRO 1 UNITS: 100 INJECTION, SOLUTION INTRAVENOUS; SUBCUTANEOUS at 22:21

## 2022-10-13 RX ADMIN — CHLORDIAZEPOXIDE HYDROCHLORIDE 15 MG: 5 CAPSULE ORAL at 14:08

## 2022-10-13 RX ADMIN — LIDOCAINE 5% 1 PATCH: 700 PATCH TOPICAL at 09:00

## 2022-10-13 RX ADMIN — RANOLAZINE 500 MG: 500 TABLET, EXTENDED RELEASE ORAL at 22:16

## 2022-10-13 RX ADMIN — RANOLAZINE 500 MG: 500 TABLET, EXTENDED RELEASE ORAL at 08:59

## 2022-10-13 RX ADMIN — CHLORDIAZEPOXIDE HYDROCHLORIDE 15 MG: 5 CAPSULE ORAL at 07:02

## 2022-10-13 RX ADMIN — METOPROLOL TARTRATE 12.5 MG: 25 TABLET, FILM COATED ORAL at 22:16

## 2022-10-13 RX ADMIN — METOPROLOL TARTRATE 12.5 MG: 25 TABLET, FILM COATED ORAL at 09:03

## 2022-10-13 RX ADMIN — AMLODIPINE BESYLATE 2.5 MG: 2.5 TABLET ORAL at 09:03

## 2022-10-13 RX ADMIN — CHLORDIAZEPOXIDE HYDROCHLORIDE 10 MG: 10 CAPSULE ORAL at 22:16

## 2022-10-13 NOTE — PROGRESS NOTES
Daily Progress Note - [address] [facility] (formerly [facility]3]  Family Medicine Residency  Lamar Painter 63 y o  male MRN: [id number]  Unit/Bed#: [address] Encounter: [id number]  Admitting Physician: Nabila Bran  PCP: Alexandru Wilde MD  Date of Admission:  10/10/2022  5:33 PM    Summary:     IVFs: sodium chloride, Last Rate: 100 mL/hr (10/13/22 0224)      Diet: Diet Vlad/CHO Controlled; Consistent Carbohydrate Diet Level 2 (5 carb servings/75 grams CHO/meal); Fluid Restriction 1800 ML  VTE:  Heparin   Mechanical prophylaxis in place  Patient Centered Rounds: I have performed bedside rounds with nursing staff today  Specialists/Other Care Team Provider: ENT    LOS: 3 day(s)  Status: Inpatient   Disposition: The patient will continue to require additional inpatient hospital stay due to Alcohol withdrawl  Code Status: Level 1 - Full Code    Assessment and Plan    Lumbar spondylitis ([facility] [address] )  Assessment & Plan  XR Spine Lumbar: Lumbar spondylosis    Incidental finding  Referral to Spine center as outpatient  after discharge    Nasal bone fracture  Assessment & Plan  Acute   Patient presented after a fall that occurred on 10/09  CT scan showed nasal bone fracture  · ENT consulted - recommended to follow up outpatient     Acute on chronic kidney failure [facility])  Assessment & Plan  Acute on Chronic     Lab Results   Component Value Date    EGFR 22 10/10/2022    EGFR 57 04/11/2022    EGFR 46 04/10/2022    CREATININE 2 95 (H) 10/10/2022    CREATININE 1 34 (H) 04/11/2022    CREATININE 1 59 (H) 04/10/2022   Baseline creatinine ranges from 1 3 to 1 5  Creatinine elevated on admission at 2 95 -> 1 12 (10/11)  · Monitor inputs and outputs  · Hold/avoid nephrotoxic medication  · Trend BMP daily    History of Atrial fibrillation (HCC)  Assessment & Plan  History of paroxysmal a-fib  · Continue home medications  · On telemetry for multiple electrolyte abnormalities      Chronic heart failure with preserved ejection fraction [facility])  Assessment & Plan  Chronic, stable   Wt Readings from Last 3 Encounters:   10/10/22 87 1 kg (192 lb)   08/16/22 87 1 kg (192 lb)   06/27/22 84 6 kg (186 lb 9 6 oz)     Last echo done on 12/2020 showed EF 55-60%, G1DD  · Continue home medications, hold nephrotoxic drugs  · Daily weights, monitor I/O  · Amlodipine 2 5 mg added on 10/11    Hyponatremia  Assessment & Plan  Acute   Admission Na 123  Likely secondary to dehydration due to diarrhea/vomiting and poor PO intake  · Received 1L NS bolus in the ER  · Repeat BMP after 4 hours - 121  · Monitor sodium: 121>125>126>129  · IVF NS 100ml/hr   · Avoid overcorrecting Na too quickly - should be corrected at rate of <0 5 per hour  · Euvolimic hyponatremia  · Urine Osmolality: 308  · Urine sodium pending  · Possible SIADH      Alcohol abuse  Assessment & Plan  Chronic   Ethanol level on admission was 39, last drink was yesterday on 10/09 at 7:00 p m  Patient endorses daily drinking  · Continue thiamine, folic acid  · UDS - Negative     Thrombocytopenia (HCC)  Assessment & Plan  Acute on Chronic   · Platelets on admission 89 --> 79  · Patient has a long history of thrombocytopenia likely secondary to alcohol abuse  CAD (coronary artery disease)  Assessment & Plan  Chronic   Hx of CAD s/p CABG 2004, PCI to LM 2014  · Not on eliquis secondary to history of falls, chronic alcohol use, thrombocytopenia, increased risk of bleeding    Hypomagnesemia  Assessment & Plan  Acute   Magnesium on admission at 1 2, likely secondary to GI losses with chronic alcohol use    · Received 2 gm of magnesium sulfate in the ER  · Trend daily  · 1 8 10/11  · Replete as necessary    Type 2 diabetes mellitus (HCC)  Assessment & Plan  Chronic, stable     Lab Results   Component Value Date    HGBA1C 5 1 04/09/2022       Recent Labs     10/11/22  0034 10/11/22  0735   POCGLU 106 90     · Insulin sliding scale  · Hold home metformin setting of acute on chronic renal failure      COPD (chronic obstructive pulmonary disease) ([facility])  Assessment & Plan  Chronic, stable  · Continue home inhalers breo-ellipta and albuterol  · Duo-nebs q6h PRN  * Alcohol withdrawal ([facility])  Assessment & Plan  Acute   Ethanol level on admission was 39, last drink was yesterday on 10/09 at 7:00 p m  Patient endorses daily drinking  · Continue thiamine, folic acid  · UDS  · Fall precautions  · Seizure precautions  · On CIWA protocol  · Pt is still has tremors (CIWA3)  · Librium 25 mg Q8H (10/11), 15 mg Q8H (10/12), 10 mg (10/13) tapered as per CIWA          Subjective:   Pt was seen and examined at the bedside, NAD  Pt doesn't have any new complaints  Pt still has back pain  Pt still have tremors and he states that it is similar to ones he gets whenever he does not drink alcohol  Review of Systems   Constitutional: Negative for chills and fever  HENT: Positive for facial swelling (nasal fracture)  Negative for ear pain, nosebleeds, sinus pressure and sore throat  Eyes: Negative for pain and visual disturbance  Respiratory: Negative for cough and shortness of breath  Cardiovascular: Negative for chest pain and palpitations  Gastrointestinal: Negative for abdominal pain and vomiting  Genitourinary: Negative for dysuria and hematuria  Musculoskeletal: Positive for back pain  Negative for arthralgias  Skin: Negative for color change and rash  Neurological: Positive for tremors (alchohol withdrawal )  Negative for seizures and syncope  Psychiatric/Behavioral: Negative for agitation, behavioral problems and confusion  The patient is not nervous/anxious  All other systems reviewed and are negative  Objective     Vitals:   Temp (24hrs), Av 3 °F (36 8 °C), Min:97 °F (36 1 °C), Max:98 8 °F (37 1 °C)    Temp:  [97 °F (36 1 °C)-98 8 °F (37 1 °C)] 98 2 °F (36 8 °C)  HR:  [66-93] 68  Resp:  [16-20] 18  BP: (109-190)/() 137/72  SpO2:  [92 %-98 %] 98 %  Body mass index is 26 04 kg/m²  Input and Output Summary (last 24 hours): Intake/Output Summary (Last 24 hours) at 10/13/2022 1047  Last data filed at 10/13/2022 0710  Gross per 24 hour   Intake 240 ml   Output 1625 ml   Net -1385 ml       Physical Exam:   Physical Exam  Vitals reviewed  Constitutional:       General: He is not in acute distress  Appearance: Normal appearance  He is not ill-appearing  HENT:      Head: Normocephalic  Comments: Raccoon eyes due to trauma      Right Ear: External ear normal       Left Ear: External ear normal       Nose: Nose normal  No congestion or rhinorrhea  Mouth/Throat:      Mouth: Mucous membranes are moist    Eyes:      Extraocular Movements: Extraocular movements intact  Conjunctiva/sclera: Conjunctivae normal    Cardiovascular:      Rate and Rhythm: Normal rate and regular rhythm  Pulses: Normal pulses  Heart sounds: Normal heart sounds  No murmur heard  No gallop  Pulmonary:      Effort: Pulmonary effort is normal  No respiratory distress  Breath sounds: Normal breath sounds  No stridor  No wheezing, rhonchi or rales  Chest:      Chest wall: No tenderness  Abdominal:      General: Abdomen is flat  Bowel sounds are normal  There is no distension  Palpations: Abdomen is soft  Tenderness: There is no abdominal tenderness  There is no guarding  Musculoskeletal:         General: No swelling, tenderness or deformity  Normal range of motion  Cervical back: Normal range of motion and neck supple  Right lower leg: No edema  Left lower leg: No edema  Skin:     General: Skin is warm and dry  Capillary Refill: Capillary refill takes less than 2 seconds  Findings: No bruising, erythema, lesion or rash  Neurological:      General: No focal deficit present  Mental Status: He is alert and oriented to person, place, and time     Psychiatric:         Mood and Affect: Mood normal          Behavior: Behavior normal  Thought Content: Thought content normal            Additional Data:     Labs:  Results from last 7 days   Lab Units 10/11/22  0501   WBC Thousand/uL 5 12   HEMOGLOBIN g/dL 12 7   HEMATOCRIT % 36 3*   PLATELETS Thousands/uL 79*   NEUTROS PCT % 61   LYMPHS PCT % 20   MONOS PCT % 15*   EOS PCT % 2     Results from last 7 days   Lab Units 10/13/22  0436 10/11/22  0156 10/10/22  1922   POTASSIUM mmol/L 4 7   < > 5 2   CHLORIDE mmol/L 97   < > 84*   CO2 mmol/L 25   < > 22   BUN mg/dL 23   < > 21   CREATININE mg/dL 1 12   < > 2 95*   CALCIUM mg/dL 8 7   < > 8 5   ALK PHOS U/L  --   --  86   ALT U/L  --   --  52   AST U/L  --   --  66*    < > = values in this interval not displayed  Results from last 7 days   Lab Units 10/10/22  1839   INR  0 92     Results from last 7 days   Lab Units 10/13/22  0720 10/12/22  2004 10/12/22  1544 10/12/22  1114 10/12/22  0727   POC GLUCOSE mg/dl 107 112 103 92 94           * I Have Reviewed All Lab Data Listed Above  * Additional Pertinent Lab Tests Reviewed: All Labs Within Last 24 Hours Reviewed    Imaging:    Imaging Reports Reviewed Today Include: none  Imaging Personally Reviewed by Myself Includes:  none    Recent Cultures (last 7 days):              Active Medications  Scheduled Meds:  Current Facility-Administered Medications   Medication Dose Route Frequency Provider Last Rate   • acetaminophen  975 mg Oral Q8H PRN Maximiano Osgood, MD     • albuterol  2 puff Inhalation Q4H PRN Maximiano Osgood, MD     • amLODIPine  2 5 mg Oral Daily Maximiano Osgood, MD     • benzonatate  100 mg Oral TID PRN Maximiano Osgood, MD     • chlordiazePOXIDE  15 mg Oral [facility] Tigre Izquierdo MD      Followed by   • chlordiazePOXIDE  10 mg Oral [facility] Tigre Izquierdo MD      Followed by   • [START ON 10/14/2022] chlordiazePOXIDE  5 mg Oral [facility] Tigre Izquierdo MD     • ferrous sulfate  325 mg Oral Daily With Breakfast Gurmeet Rousseau Elnoria Fleischer, MD     • fluticasone-vilanterol  1 puff Inhalation Daily Estrellita Lynne MD     • folic acid  1 mg Oral Daily Ruperto Dillon MD     • heparin (porcine)  5,000 Units Subcutaneous [facility] Lucian Dupree MD     • insulin lispro  1-5 Units Subcutaneous 4x Daily (AC & HS) Ruperto Dillon MD     • lidocaine  1 patch Topical Daily Ruperto Dillon MD     • magnesium oxide  400 mg Oral BID Estrellita Lynne MD     • metoprolol tartrate  12 5 mg Oral Q12H Estrellita Lynne MD     • multivitamin-minerals  1 tablet Oral Daily Ruperto Dillon MD     • nicotine  1 patch Transdermal Daily Estrellita Lynne MD     • pravastatin  40 mg Oral Daily With Peter Powers MD     • ranolazine  500 mg Oral Q12H Estrellita Lynne MD     • sodium chloride  100 mL/hr Intravenous Continuous Ruperto Dillon  mL/hr (10/13/22 0224)   • tamsulosin  0 4 mg Oral Daily Estrellita Lynne MD     • thiamine  100 mg Oral Daily Estrellita Lynne MD       Continuous Infusions:  sodium chloride, 100 mL/hr, Last Rate: 100 mL/hr (10/13/22 0224)      PRN Meds:   acetaminophen, 975 mg, Q8H PRN  albuterol, 2 puff, Q4H PRN  benzonatate, 100 mg, TID PRN                Patient Information Sharing: With the consent of Sotero Antonio , their loved ones were notified today by inpatient team of the patient’s condition and current plan  All questions answered      Lucian Dupree  10/13/22  10:47 AM

## 2022-10-13 NOTE — PHYSICAL THERAPY NOTE
PHYSICAL THERAPY EVALUATION/TREATMENT       10/13/22 1040   PT Last Visit   PT Visit Date 10/13/22   Note Type   Note type Evaluation   Pain Assessment   Pain Assessment Tool 0-10   Pain Score 5   Pain Location/Orientation Orientation: Lower; Location: Back   Pain Onset/Description Descriptor: Discomfort   Effect of Pain on Daily Activities limits mobility   Patient's Stated Pain Goal No pain   Hospital Pain Intervention(s) Repositioned   Multiple Pain Sites No   Restrictions/Precautions   Weight Bearing Precautions Per Order No   Other Precautions Pain   Home Living   Type of [address] One level;Elevator   Bathroom Toilet Standard   Home Equipment Walker   Additional Comments Pt reports he uses walker intermittently when feeling unsteady - reports he was using for 2-3 days prior to admission  Prior Function   Level of [place] Independent with ADLs; Needs assistance with IADLS  (Does not drive)   Lives With Significant other  (pt reports girlfriend is currently staying with him)   Receives Help From Family   IADLs Family/Friend/Other provides transportation   Vocational On disability   General   Additional Pertinent History Pt is a 63year-old male who was admitted to the hospital on 10/10/22 due to fall, nasal bone fracture   Pt with history of alcohol abuse per chart review   Family/Caregiver Present No   Cognition   Overall Cognitive Status WFL   Orientation Level Oriented X4   Following Commands Follows all commands and directions without difficulty   Subjective   Subjective "my back is bothering me"   RLE Assessment   RLE Assessment WFL   LLE Assessment   LLE Assessment WFL   Bed Mobility   Supine to Sit 7  Independent   Sit to Supine 7  Independent   Transfers   Sit to Stand 5  Supervision   Stand to Sit 5  Supervision   Ambulation/Elevation   Gait pattern Step through pattern   Gait Assistance 5  Supervision   Additional items Verbal cues   Assistive Device Rolling walker Distance 25 feet   Stair Management Assistance 5  Supervision   Additional items Verbal cues   Stair Management Technique Two rails; Reciprocal   Number of Stairs 4   Balance   Static Sitting Normal   Dynamic Sitting Good   Static Standing Fair +   Dynamic Standing Fair   Ambulatory Fair -   Activity Tolerance   Activity Tolerance Patient tolerated treatment well;Patient limited by fatigue   Nurse Made Aware yes   Assessment   Prognosis Good   Problem List Decreased strength;Decreased range of motion;Decreased endurance; Impaired balance;Decreased mobility;Pain   Assessment Patient seen for Physical Therapy evaluation  Patient admitted with Alcohol withdrawal ([facility] [address] )  Comorbidities affecting patient's physical performance include: Afib, ENMA, CAD, cardiac disease, COPD, diabetes and falls  Personal factors affecting patient at time of initial evaluation include: lives in 1 story house and positive fall history  Prior to admission, patient was independent with functional mobility without assistive device, independent with ADLS, requiring assist for IADLS and living with significant other in a 1 level home with 0 steps to enter  Please find objective findings from Physical Therapy assessment regarding body systems outlined above with impairments and limitations including weakness, impaired balance, decreased endurance, gait deviations, pain, decreased activity tolerance, decreased functional mobility tolerance, fall risk and decreased skin integrity  The Barthel Index was used as a functional outcome tool presenting with a score of Barthel Index Score: 85 today indicating minimal limitations of functional mobility and ADLS  Patient's clinical presentation is currently evolving as seen in patient's presentation of changing level of pain, increased fall risk and decreased endurance   Pt would benefit from continued Physical Therapy treatment to address deficits as defined above and maximize level of functional mobility  As demonstrated by objective findings, the assigned level of complexity for this evaluation is moderate  The patient's AM-PAC Basic Mobility Inpatient Short Form Raw Score is 22  A Raw score of greater than 16 suggests the patient may benefit from discharge to home  Please also refer to the recommendation of the Physical Therapist for safe discharge planning  Goals   Patient Goals to go home   STG Expiration Date 10/20/22   Short Term Goal #1 Pt will perform bed mobility/transfers IND   Short Term Goal #2 Pt will ambulate x 500 feet Mod I with RW   LTG Expiration Date 10/27/22   Long Term Goal #1 Pt will ambulate x 500 feet IND with no AD   Long Term Goal #2 Pt will negotiate x 1 step/curb IND   Plan   Treatment/Interventions ADL retraining;Functional transfer training;LE strengthening/ROM; Therapeutic exercise; Endurance training;Bed mobility;Gait training;Spoke to nursing   PT Frequency 3-5x/wk   Recommendation   PT Discharge Recommendation Home with home health rehabilitation   Additional Comments Pt refusing home PT services at this time   AM-PAC Basic Mobility Inpatient   Turning in Bed Without Bedrails 4   Lying on Back to Sitting on Edge of Flat Bed 4   Moving Bed to Chair 4   Standing Up From Chair 4   Walk in Room 3   Climb 3-5 Stairs 3   Basic Mobility Inpatient Raw Score 22   Basic Mobility Standardized Score 47 4   Highest Level Of Mobility   JH-HLM Goal 7: Walk 25 feet or more   JH-HLM Achieved 7: Walk 25 feet or more   Barthel Index   Feeding 10   Bathing 5   Grooming Score 5   Dressing Score 10   Bladder Score 10   Bowels Score 10   Toilet Use Score 10   Transfers (Bed/Chair) Score 10   Mobility (Level Surface) Score 10   Stairs Score 5   Barthel Index Score 85   Additional Treatment Session   Start Time 1030   End Time 1040   Treatment Assessment S: "I can walk more" O/A: Pt agreeable to PT session this AM  Pt able to progress ambulate x additional 120 feet with RW with supervision   Pt reports mild lightheaded feeling at approx 60 feet - returned to room sitting at edge of bed with resolution of symptoms  Requires intermittent verbal cues for proper hand placement when using RW  Able to ambulate short distances (approx 5-10 feet) within room without device with Supervision - easily fatigues  P: Pt will benefit from skilled PT to address deficits to maximize IND for safe d/c   Equipment Use walker   End of Consult   Patient Position at End of Consult Seated edge of bed; All needs within reach   [facility] Insurance Number  Laury Zara [id number]

## 2022-10-13 NOTE — ASSESSMENT & PLAN NOTE
XR Spine Lumbar: Lumbar spondylosis    Incidental finding  Referral to Spine center as outpatient  after discharge

## 2022-10-14 VITALS
BODY MASS INDEX: 26.04 KG/M2 | WEIGHT: 192.24 LBS | OXYGEN SATURATION: 96 % | SYSTOLIC BLOOD PRESSURE: 166 MMHG | TEMPERATURE: 98.2 F | HEIGHT: 72 IN | DIASTOLIC BLOOD PRESSURE: 91 MMHG | HEART RATE: 72 BPM | RESPIRATION RATE: 18 BRPM

## 2022-10-14 LAB
ANION GAP SERPL CALCULATED.3IONS-SCNC: 7 MMOL/L (ref 4–13)
BUN SERPL-MCNC: 18 MG/DL (ref 5–25)
CALCIUM SERPL-MCNC: 8.5 MG/DL (ref 8.3–10.1)
CHLORIDE SERPL-SCNC: 99 MMOL/L (ref 96–108)
CO2 SERPL-SCNC: 24 MMOL/L (ref 21–32)
CREAT SERPL-MCNC: 1.05 MG/DL (ref 0.6–1.3)
GFR SERPL CREATININE-BSD FRML MDRD: 76 ML/MIN/1.73SQ M
GLUCOSE SERPL-MCNC: 106 MG/DL (ref 65–140)
GLUCOSE SERPL-MCNC: 111 MG/DL (ref 65–140)
GLUCOSE SERPL-MCNC: 120 MG/DL (ref 65–140)
GLUCOSE SERPL-MCNC: 93 MG/DL (ref 65–140)
MAGNESIUM SERPL-MCNC: 1.3 MG/DL (ref 1.6–2.6)
POTASSIUM SERPL-SCNC: 4.5 MMOL/L (ref 3.5–5.3)
SODIUM SERPL-SCNC: 130 MMOL/L (ref 135–147)

## 2022-10-14 PROCEDURE — 82948 REAGENT STRIP/BLOOD GLUCOSE: CPT

## 2022-10-14 PROCEDURE — 99239 HOSP IP/OBS DSCHRG MGMT >30: CPT | Performed by: STUDENT IN AN ORGANIZED HEALTH CARE EDUCATION/TRAINING PROGRAM

## 2022-10-14 PROCEDURE — 99232 SBSQ HOSP IP/OBS MODERATE 35: CPT | Performed by: STUDENT IN AN ORGANIZED HEALTH CARE EDUCATION/TRAINING PROGRAM

## 2022-10-14 PROCEDURE — 83735 ASSAY OF MAGNESIUM: CPT

## 2022-10-14 PROCEDURE — 80048 BASIC METABOLIC PNL TOTAL CA: CPT

## 2022-10-14 PROCEDURE — 99222 1ST HOSP IP/OBS MODERATE 55: CPT | Performed by: OTOLARYNGOLOGY

## 2022-10-14 RX ORDER — CHLORDIAZEPOXIDE HYDROCHLORIDE 25 MG/1
50 CAPSULE, GELATIN COATED ORAL EVERY 8 HOURS SCHEDULED
Status: DISCONTINUED | OUTPATIENT
Start: 2022-10-14 | End: 2022-10-14 | Stop reason: HOSPADM

## 2022-10-14 RX ORDER — MAGNESIUM SULFATE HEPTAHYDRATE 40 MG/ML
4 INJECTION, SOLUTION INTRAVENOUS ONCE
Status: COMPLETED | OUTPATIENT
Start: 2022-10-14 | End: 2022-10-14

## 2022-10-14 RX ORDER — CHLORDIAZEPOXIDE HYDROCHLORIDE 25 MG/1
25 CAPSULE, GELATIN COATED ORAL EVERY 8 HOURS SCHEDULED
Status: DISCONTINUED | OUTPATIENT
Start: 2022-10-15 | End: 2022-10-14 | Stop reason: HOSPADM

## 2022-10-14 RX ORDER — MAGNESIUM SULFATE HEPTAHYDRATE 40 MG/ML
2 INJECTION, SOLUTION INTRAVENOUS ONCE
Status: COMPLETED | OUTPATIENT
Start: 2022-10-14 | End: 2022-10-14

## 2022-10-14 RX ADMIN — ACETAMINOPHEN 975 MG: 325 TABLET, FILM COATED ORAL at 05:39

## 2022-10-14 RX ADMIN — THIAMINE HCL TAB 100 MG 100 MG: 100 TAB at 08:47

## 2022-10-14 RX ADMIN — MAGNESIUM OXIDE TAB 400 MG (241.3 MG ELEMENTAL MG) 400 MG: 400 (241.3 MG) TAB at 17:43

## 2022-10-14 RX ADMIN — RANOLAZINE 500 MG: 500 TABLET, EXTENDED RELEASE ORAL at 08:47

## 2022-10-14 RX ADMIN — FOLIC ACID 1 MG: 1 TABLET ORAL at 08:48

## 2022-10-14 RX ADMIN — AMLODIPINE BESYLATE 2.5 MG: 2.5 TABLET ORAL at 08:56

## 2022-10-14 RX ADMIN — FERROUS SULFATE TAB 325 MG (65 MG ELEMENTAL FE) 325 MG: 325 (65 FE) TAB at 08:47

## 2022-10-14 RX ADMIN — Medication 1 TABLET: at 08:47

## 2022-10-14 RX ADMIN — HEPARIN SODIUM 5000 UNITS: 5000 INJECTION INTRAVENOUS; SUBCUTANEOUS at 16:03

## 2022-10-14 RX ADMIN — MAGNESIUM SULFATE HEPTAHYDRATE 2 G: 40 INJECTION, SOLUTION INTRAVENOUS at 12:31

## 2022-10-14 RX ADMIN — CHLORDIAZEPOXIDE HYDROCHLORIDE 10 MG: 10 CAPSULE ORAL at 05:36

## 2022-10-14 RX ADMIN — NICOTINE 1 PATCH: 21 PATCH, EXTENDED RELEASE TRANSDERMAL at 08:47

## 2022-10-14 RX ADMIN — MAGNESIUM OXIDE TAB 400 MG (241.3 MG ELEMENTAL MG) 400 MG: 400 (241.3 MG) TAB at 08:47

## 2022-10-14 RX ADMIN — TAMSULOSIN HYDROCHLORIDE 0.4 MG: 0.4 CAPSULE ORAL at 08:48

## 2022-10-14 RX ADMIN — METOPROLOL TARTRATE 12.5 MG: 25 TABLET, FILM COATED ORAL at 09:00

## 2022-10-14 RX ADMIN — FLUTICASONE FUROATE AND VILANTEROL TRIFENATATE 1 PUFF: 200; 25 POWDER RESPIRATORY (INHALATION) at 08:49

## 2022-10-14 RX ADMIN — LIDOCAINE 5% 1 PATCH: 700 PATCH TOPICAL at 08:47

## 2022-10-14 RX ADMIN — MAGNESIUM SULFATE HEPTAHYDRATE 4 G: 40 INJECTION, SOLUTION INTRAVENOUS at 09:00

## 2022-10-14 RX ADMIN — PRAVASTATIN SODIUM 40 MG: 40 TABLET ORAL at 16:03

## 2022-10-14 RX ADMIN — HEPARIN SODIUM 5000 UNITS: 5000 INJECTION INTRAVENOUS; SUBCUTANEOUS at 05:35

## 2022-10-14 RX ADMIN — SODIUM CHLORIDE 100 ML/HR: 0.9 INJECTION, SOLUTION INTRAVENOUS at 08:49

## 2022-10-14 NOTE — QUICK NOTE
Standard Teaching Supervising Statement    Patient was seen and examined at bedside today  I have reviewed the note performed and documented by the Resident  I personally performed the required components/examined the patient  I supervised the Resident and I agree with the Resident findings and plan of care during admission with the following additions/exceptions:      Pt significant other at bedside  Patient  reported that he still had some back pain, tremors and headache     Patient denied blurry vision besides cataracts, nausea or vomiting or sweating  Patient given permission to shower  Patient had no other concerns    PE  Gen: AOx3, NAD  HEENT:  Babita-orbital ecchymosis (raccoon sign), nose midline, no sandra sign, no CSF leakage, EOMI, PERRLA  Respi:CTA bilaterally, no wheezing  Cardio: No murmurs, rubs or gallops  Ab:soft non-tender, no rebound   Msk:  BLUE tremors No BLLE, no point tenderness on spine  Psych:  No SI, Cooperative     A&P  Alcohol withdrawal- unresolved, CIWA-5,Librium taper in place,  will follow protocol and titrate, electrolyte bag, counseled on cessation, patient agreeable, refuse disulfiram  Nasal bone fracture-CT positive, ENT recs no intervention at this time, can follow-up outpatient  Hyponatremia- unresolved, slowly improving, asymptomatic possibly due to SIADH vs N, V, D or dehydration R/O beer pontomania, continue to limit fluids , urine osmol- 80, DC any contributing medications  Hypomagnesium-unresolved, continue to replenish, check DTRs  N/V-resolved, ivf and anti-emetics p r n  A/C CKD- gently replenish fluids, hold diuretics, avoid neprhrotoxs  T2DM- in remission, ssi, hold home metformin, re-evaluate need on discharge with PCP  COPD- chronic, use home meds Breo Ellipta and albuterol, DuoNebs  Back pain-lumbar x-ray Lumbar spondylosis  lidocaine patch in place  HTN-continue Norvasc    All other chronic conditions managed per home regiment    RYLIE Moise,  Ed   10/14/22

## 2022-10-14 NOTE — OCCUPATIONAL THERAPY NOTE
OT EVALUATION       10/14/22 1333   Note Type   Note type Screen   Additional Comments No skilled OT needs at this time per PT     Licensure   [place] License Number  Giovannirosy Galloway Carlos [address] 89 OTR/L [id number]

## 2022-10-14 NOTE — PLAN OF CARE
Problem: Potential for Falls  Goal: Patient will remain free of falls  Description: INTERVENTIONS:  - Educate patient/family on patient safety including physical limitations  - Instruct patient to call for assistance with activity   - Consult OT/PT to assist with strengthening/mobility   - Keep Call bell within reach  - Keep bed low and locked with side rails adjusted as appropriate  - Keep care items and personal belongings within reach  - Initiate and maintain comfort rounds  - Make Fall Risk Sign visible to staff  - Offer Toileting every 2 Hours, in advance of need  - Initiate/Maintain bed alarm  - Obtain necessary fall risk management equipment: walker  - Apply yellow socks and bracelet for high fall risk patients  - Consider moving patient to room near nurses station  Outcome: Progressing     Problem: Nutrition/Hydration-ADULT  Goal: Nutrient/Hydration intake appropriate for improving, restoring or maintaining nutritional needs  Description: Monitor and assess patient's nutrition/hydration status for malnutrition  Collaborate with interdisciplinary team and initiate plan and interventions as ordered  Monitor patient's weight and dietary intake as ordered or per policy  Utilize nutrition screening tool and intervene as necessary  Determine patient's food preferences and provide high-protein, high-caloric foods as appropriate       INTERVENTIONS:  - Monitor oral intake, urinary output, labs, and treatment plans  - Assess nutrition and hydration status and recommend course of action  - Evaluate amount of meals eaten  - Assist patient with eating if necessary   - Allow adequate time for meals  - Recommend/ encourage appropriate diets, oral nutritional supplements, and vitamin/mineral supplements  - Order, calculate, and assess calorie counts as needed  - Recommend, monitor, and adjust tube feedings and TPN/PPN based on assessed needs  - Assess need for intravenous fluids  - Provide specific nutrition/hydration education as appropriate  - Include patient/family/caregiver in decisions related to nutrition  Outcome: Progressing     Problem: NEUROSENSORY - ADULT  Goal: Achieves stable or improved neurological status  Description: INTERVENTIONS  - Monitor and report changes in neurological status  - Monitor vital signs such as temperature, blood pressure, glucose, and any other labs ordered   - Initiate measures to prevent increased intracranial pressure  - Monitor for seizure activity and implement precautions if appropriate      Outcome: Progressing  Goal: Remains free of injury related to seizures activity  Description: INTERVENTIONS  - Maintain airway, patient safety  and administer oxygen as ordered  - Monitor patient for seizure activity, document and report duration and description of seizure to physician/advanced practitioner  - If seizure occurs,  ensure patient safety during seizure  - Reorient patient post seizure  - Seizure pads on all 4 side rails  - Instruct patient/family to notify RN of any seizure activity including if an aura is experienced  - Instruct patient/family to call for assistance with activity based on nursing assessment  - Administer anti-seizure medications if ordered    Outcome: Progressing  Goal: Achieves maximal functionality and self care  Description: INTERVENTIONS  - Monitor swallowing and airway patency with patient fatigue and changes in neurological status  - Encourage and assist patient to increase activity and self care     - Encourage visually impaired, hearing impaired and aphasic patients to use assistive/communication devices  Outcome: Progressing     Problem: CARDIOVASCULAR - ADULT  Goal: Maintains optimal cardiac output and hemodynamic stability  Description: INTERVENTIONS:  - Monitor I/O, vital signs and rhythm  - Monitor for S/S and trends of decreased cardiac output  - Administer and titrate ordered vasoactive medications to optimize hemodynamic stability  - Assess quality of pulses, skin color and temperature  - Assess for signs of decreased coronary artery perfusion  - Instruct patient to report change in severity of symptoms  Outcome: Progressing  Goal: Absence of cardiac dysrhythmias or at baseline rhythm  Description: INTERVENTIONS:  - Continuous cardiac monitoring, vital signs, obtain 12 lead EKG if ordered  - Administer antiarrhythmic and heart rate control medications as ordered  - Monitor electrolytes and administer replacement therapy as ordered  Outcome: Progressing     Problem: MOBILITY - ADULT  Goal: Maintain or return to baseline ADL function  Description: INTERVENTIONS:  -  Assess patient's ability to carry out ADLs; assess patient's baseline for ADL function and identify physical deficits which impact ability to perform ADLs (bathing, care of mouth/teeth, toileting, grooming, dressing, etc )  - Assess/evaluate cause of self-care deficits   - Assess range of motion  - Assess patient's mobility; develop plan if impaired  - Assess patient's need for assistive devices and provide as appropriate  - Encourage maximum independence but intervene and supervise when necessary  - Involve family in performance of ADLs  - Assess for home care needs following discharge   - Consider OT consult to assist with ADL evaluation and planning for discharge  - Provide patient education as appropriate  Outcome: Progressing  Goal: Maintains/Returns to pre admission functional level  Description: INTERVENTIONS:  - Perform BMAT or MOVE assessment daily    - Set and communicate daily mobility goal to care team and patient/family/caregiver  - Collaborate with rehabilitation services on mobility goals if consulted  - Perform Range of Motion 2 times a day  - Reposition patient every 2 hours    - Dangle patient 2 times a day  - Stand patient 2 times a day  - Ambulate patient 2 times a day  - Out of bed to chair 2 times a day   - Out of bed for meals 2 times a day  - Out of bed for toileting  - Record patient progress and toleration of activity level   Outcome: Progressing

## 2022-10-14 NOTE — PLAN OF CARE
Problem: Potential for Falls  Goal: Patient will remain free of falls  Description: INTERVENTIONS:  - Educate patient/family on patient safety including physical limitations  - Instruct patient to call for assistance with activity   - Consult OT/PT to assist with strengthening/mobility   - Keep Call bell within reach  - Keep bed low and locked with side rails adjusted as appropriate  - Keep care items and personal belongings within reach  - Initiate and maintain comfort rounds  - Make Fall Risk Sign visible to staff  - Offer Toileting every 2 Hours, in advance of need  - Initiate/Maintain bed/chair alarm  - Obtain necessary fall risk management equipment: fall risk sign  - Apply yellow socks and bracelet for high fall risk patients  - Consider moving patient to room near nurses station  Outcome: Progressing     Problem: NEUROSENSORY - ADULT  Goal: Achieves stable or improved neurological status  Description: INTERVENTIONS  - Monitor and report changes in neurological status  - Monitor vital signs such as temperature, blood pressure, glucose, and any other labs ordered   - Initiate measures to prevent increased intracranial pressure  - Monitor for seizure activity and implement precautions if appropriate      Outcome: Progressing     Problem: CARDIOVASCULAR - ADULT  Goal: Maintains optimal cardiac output and hemodynamic stability  Description: INTERVENTIONS:  - Monitor I/O, vital signs and rhythm  - Monitor for S/S and trends of decreased cardiac output  - Administer and titrate ordered vasoactive medications to optimize hemodynamic stability  - Assess quality of pulses, skin color and temperature  - Assess for signs of decreased coronary artery perfusion  - Instruct patient to report change in severity of symptoms  Outcome: Progressing

## 2022-10-14 NOTE — CONSULTS
Otolaryngology (ENT) Consultation Note     Hari Collier  [id number]  1960       Chief Complaint: Nasal bone fracture    History of Present Illness:  51-year-old man who ENT is consulted for nasal bone fracture  He was admitted to the hospital on 10/10/2022 because he fell and hit his face on the door  He is currently in the hospital for alcohol withdrawal   He states that he did have bleeding at this time  However no more bleeding, and he is breathing through his nose okay  He states that he has seen his nose, and does not think it looks 2 out of place  He has fractured his nose before, most recently earlier this year, where he had another CT scan of the facial bones that did show nasal bone fractures  He states that he has never had any surgery or intervention for this  No other major issues  No Known Allergies    Past Medical History:   Diagnosis Date   • Cancer [facility])     prostate ca,had radiation   • Cardiac disease     stents,then triple bypass   • COPD (chronic obstructive pulmonary disease) ([facility] [address] )    • ETOH abuse    • Hx of heart artery stent     2014   • Hyperlipidemia    • Hypertension    • [facility] 12/22/2019   • Prostate CA ([facility] [address] )    • S/P CABG x 1     2004       Past Surgical History:   Procedure Laterality Date   • CORONARY ARTERY BYPASS GRAFT  2004   • WI ARTHRODESIS ANT INTERBODY MIN DISCECTOMY, CERVICAL BELOW C2 N/A 12/16/2020    Procedure: Anterior cervical discectomy with fusion C4-C7;  Posterior cervical decompression and fusion C2-T2;  Surgeon: Sage Wilson MD;  Location: [facility];  Service: Neurosurgery   • TONSILLECTOMY         Social History     Socioeconomic History   • Marital status: Single     Spouse name: Not on file   • Number of children: Not on file   • Years of education: Not on file   • Highest education level: Not on file   Occupational History   • Not on file   Tobacco Use   • Smoking status: Current Every Day Smoker     Packs/day: 1 50     Years: 40 00     Pack years: 60 00     Types: Cigarettes   • Smokeless tobacco: Never Used   Vaping Use   • Vaping Use: Never used   Substance and Sexual Activity   • Alcohol use: Yes     Alcohol/week: 10 0 standard drinks     Types: 10 Shots of liquor per week     Comment: last drink friday   • Drug use: No   • Sexual activity: Not Currently   Other Topics Concern   • Not on file   Social History Narrative   • Not on file     Social Determinants of Health     Financial Resource Strain: Not on file   Food Insecurity: No Food Insecurity   • Worried About Running Out of Food in the Last Year: Never true   • Ran Out of Food in the Last Year: Never true   Transportation Needs: No Transportation Needs   • Lack of Transportation (Medical): No   • Lack of Transportation (Non-Medical): No   Physical Activity: Not on file   Stress: Not on file   Social Connections: Not on file   Intimate Partner Violence: Not on file   Housing Stability: High Risk   • Unable to Pay for Housing in the Last Year: No   • Number of Places Lived in the Last Year: 1   • Unstable Housing in the Last Year: Yes       Family History   Problem Relation Age of Onset   • Diabetes Mother    • Uterine cancer Mother    • COPD Father    • Hypertension Father        No current facility-administered medications on file prior to encounter  Current Outpatient Medications on File Prior to Encounter   Medication Sig Dispense Refill   • albuterol (Ventolin HFA) 90 mcg/act inhaler Inhale 2 puffs every 4 (four) hours as needed for wheezing 18 g 0   • ferrous sulfate 324 (65 Fe) mg Take 1 tablet (324 mg total) by mouth 2 (two) times a day before meals 60 tablet 2   • fluticasone-vilanterol (Breo Ellipta) 200-25 MCG/INH inhaler Inhale 1 puff daily Rinse mouth after use   60 each 0   • folic acid (FOLVITE) 1 mg tablet Take 1 tablet (1,000 mcg total) by mouth daily 90 tablet 2   • gabapentin (NEURONTIN) 300 mg capsule TAKE ONE CAPSULE THREE TIMES DAILY 90 capsule 1   • lisinopril (ZESTRIL) 10 mg tablet Take 0 5 tablets (5 mg total) by mouth daily 90 tablet 0   • magnesium Oxide (MAG-OX) 400 mg TABS Take 1 tablet (400 mg total) by mouth 2 (two) times a day 120 tablet 2   • metFORMIN (GLUCOPHAGE) 1000 MG tablet Take 1 tablet (1,000 mg total) by mouth every 12 (twelve) hours 180 tablet 0   • metoprolol tartrate (LOPRESSOR) 25 mg tablet Take 0 5 tablets (12 5 mg total) by mouth every 12 (twelve) hours 180 tablet 2   • omeprazole (PriLOSEC) 20 mg delayed release capsule Take 1 capsule (20 mg total) by mouth daily 90 capsule 3   • pravastatin (PRAVACHOL) 40 mg tablet Take 1 tablet (40 mg total) by mouth daily with dinner 90 tablet 2   • ranolazine (RANEXA) 500 mg 12 hr tablet Take 1 tablet (500 mg total) by mouth every 12 (twelve) hours 60 tablet 3   • tamsulosin (FLOMAX) 0 4 mg Take 1 capsule (0 4 mg total) by mouth daily 90 capsule 1   • thiamine 100 MG tablet Take 1 tablet (100 mg total) by mouth daily 90 tablet 0   • Blood Glucose Monitoring Suppl (ONE TOUCH ULTRA MINI) w/Device KIT Use as directed  0   • neomycin-polymyxin-dexamethasone (MAXITROL) ophthalmic suspension  (Patient not taking: Reported on 8/16/2022)     • ONETOUCH DELICA LANCETS FINE MISC 3 (three) times a day Test  0       Review of Systems:  10-point ROS performed  Patient denies fevers or chills  All other systems reviewed and found to be negative unless otherwise noted in the HPI  Vitals:    10/14/22 1141   BP: 166/92   Pulse: 73   Resp:    Temp: (!) 97 4 °F (36 3 °C)   SpO2: 96%       General Appearance: No apparent distress    Head: Normocephalic, atraumatic  Eyes: Conjunctiva clear, extraocular movements are intact  He does have some periorbital ecchymosis  Ears: Pinna normal shape and position  Nose:   External appearance does have some C shaped deformity appears concave the to the right side  No septal hematoma  Nasal airway appears open      Oral cavity/Oropharynx: No mucosal lesions, masses, or pharyngeal asymmetry  Neck: No cervical lymphadenopathy or masses appreciated    Skin: Skin warm and dry  Neurological: Cranial nerves II to XII are intact  Respiratory: No stridor or labored breathing  Cardiovascular: Good perfusion of the upper extremities  No cyanosis of the fingers or hands  Psychiatric: Alert and oriented  Data Reviewed:  CT scan of the facial bones does show nasal fracture  Chronicity of the nasal bone fractures are unclear, but it is minimally displaced to nondisplaced  Assessment:  Nasal bone fracture    Plan:  I discussed the nasal bone fracture with the patient  At this point, the patient is okay with the way it looks and how it is going to heal   He does not want to do any type of intervention for this  This is reasonable  Please call ENT for further questions or concerns      Twila Goltz, MD  Otolaryngology - Head & Neck Surgery  [facility]

## 2022-10-14 NOTE — PROGRESS NOTES
Daily Progress Note - [address] [facility]  Family Medicine Residency  Bucky Dupree 63 y o  male MRN: [id number]  Unit/Bed#: [address] Encounter: [id number]  Admitting Physician: Paco Beltrán DO   PCP: Devyn Salas MD  Date of Admission:  10/10/2022  5:33 PM    Assessment and Plan    * Alcohol withdrawal [facility])  Assessment & Plan  Acute   Ethanol level on admission was 39, last drink was on 10/09 at 7:00 p m  Patient endorses daily drinking  · Continue thiamine, folic acid  · UDS  · Fall precautions  · Seizure precautions  · On CIWA protocol  · Pt is still has tremors (CIWA 5)  · Restarted Librium taper at 50 mg on 10/14 followed by 25 mg on 10/15 due to CIWA increasing       Nasal bone fracture  Assessment & Plan  Acute   Patient presented after a fall that occurred on 10/09  CT scan showed nasal bone fracture  · ENT consulted - recommended to follow up outpatient     Lumbar spondylitis [facility])  Assessment & Plan  XR Spine Lumbar: Lumbar spondylosis    Incidental finding  Referral to Spine center as outpatient  after discharge    Acute on chronic kidney failure [facility])  Assessment & Plan  Acute on Chronic     Lab Results   Component Value Date    EGFR 22 10/10/2022    EGFR 57 04/11/2022    EGFR 46 04/10/2022    CREATININE 2 95 (H) 10/10/2022    CREATININE 1 34 (H) 04/11/2022    CREATININE 1 59 (H) 04/10/2022   Baseline creatinine ranges from 1 3 to 1 5  Creatinine elevated on admission at 2 95 -> 1 12 (10/11)  · Monitor inputs and outputs  · Hold/avoid nephrotoxic medication  · Trend BMP daily    History of Atrial fibrillation (HCC)  Assessment & Plan  History of paroxysmal a-fib  · Continue home medications  · On telemetry for multiple electrolyte abnormalities      Chronic heart failure with preserved ejection fraction [facility])  Assessment & Plan  Chronic, stable   Wt Readings from Last 3 Encounters:   10/10/22 87 1 kg (192 lb)   08/16/22 87 1 kg (192 lb)   06/27/22 84 6 kg (186 lb 9 6 oz) Last echo done on 12/2020 showed EF 55-60%, G1DD  · Continue home medications, hold nephrotoxic drugs  · Daily weights, monitor I/O  · Amlodipine 2 5 mg added on 10/11    Hyponatremia  Assessment & Plan  Acute   Admission Na 123  Likely secondary to dehydration due to diarrhea/vomiting and poor PO intake  · Received 1L NS bolus in the ER  · Repeat BMP after 4 hours - 121  · Monitor sodium: 121>125>126>129>131>129>130  · IVF NS 100ml/hr   · Avoid overcorrecting Na too quickly - should be corrected at rate of <0 5 per hour  · Euvolimic hyponatremia  · Urine Osmolality: 308  · Urine sodium: 88  · Possible SIADH   · Flood restriction under 800 ml a day      Alcohol abuse  Assessment & Plan  Chronic   Ethanol level on admission was 39, last drink was yesterday on 10/09 at 7:00 p m  Patient endorses daily drinking  · Continue thiamine, folic acid  · UDS - Negative     Thrombocytopenia (HCC)  Assessment & Plan  Acute on Chronic   · Platelets on admission 89 --> 79  · Patient has a long history of thrombocytopenia likely secondary to alcohol abuse  CAD (coronary artery disease)  Assessment & Plan  Chronic   Hx of CAD s/p CABG 2004, PCI to LM 2014  · Not on eliquis secondary to history of falls, chronic alcohol use, thrombocytopenia, increased risk of bleeding    Hypomagnesemia  Assessment & Plan  Acute   Magnesium on admission at 1 2, likely secondary to GI losses with chronic alcohol use    · Received 2 gm of magnesium sulfate in the ER  · Trend daily  · 1 8 10/11  · Replete as necessary    Type 2 diabetes mellitus (HCC)  Assessment & Plan  Chronic, stable     Lab Results   Component Value Date    HGBA1C 5 1 04/09/2022       Recent Labs     10/11/22  0034 10/11/22  0735   POCGLU 106 90     · Insulin sliding scale  · Hold home metformin setting of acute on chronic renal failure      COPD (chronic obstructive pulmonary disease) (HCC)  Assessment & Plan  Chronic, stable  · Continue home inhalers breo-ellipta and albuterol  · Duo-nebs q6h PRN  VTE Pharmacologic Prophylaxis:   Pharmacologic: Heparin  Mechanical VTE Prophylaxis in Place: Yes    Patient Centered Rounds: I have performed bedside rounds with nursing staff today  Time Spent for Care: 30 minutes  More than 50% of total time spent on counseling and coordination of care as described above  Current Length of Stay: 4 day(s)    Current Patient Status: Inpatient   Certification Statement: The patient will continue to require additional inpatient hospital stay due to alcohol withdrawals    Code Status: Level 1 - Full Code    Subjective:   Pt was seen and examined at bedside  He is still tremulous and reports headaches  Most recent CIWA score is 5 and received Librium 10 mg this morning  He is asking to take a shower  Pt denies any dizziness, nausea, vomiting, abdominal pain, sob, cough, chest pain  Pt is amenable to alcohol cessation resources on discharge  Objective     Objective:   Vitals:   Temp (24hrs), Av 2 °F (36 8 °C), Min:97 9 °F (36 6 °C), Max:98 5 °F (36 9 °C)    Temp:  [97 9 °F (36 6 °C)-98 5 °F (36 9 °C)] 98 4 °F (36 9 °C)  HR:  [62-83] 77  Resp:  [16-18] 18  BP: (150-175)/(70-98) 168/92  SpO2:  [92 %-97 %] 92 %  Body mass index is 26 07 kg/m²  Input and Output Summary (last 24 hours): Intake/Output Summary (Last 24 hours) at 10/14/2022 1027  Last data filed at 10/14/2022 0118  Gross per 24 hour   Intake 360 ml   Output 3150 ml   Net -2790 ml       Physical Exam:   Physical Exam  Constitutional:       Appearance: He is normal weight  HENT:      Head: Raccoon eyes present  Right Ear: External ear normal       Left Ear: External ear normal       Mouth/Throat:      Mouth: Mucous membranes are moist    Eyes:      General: No scleral icterus  Conjunctiva/sclera: Conjunctivae normal    Cardiovascular:      Rate and Rhythm: Normal rate and regular rhythm  Pulses: Normal pulses  Heart sounds: Normal heart sounds   No murmur heard  Pulmonary:      Effort: Pulmonary effort is normal  No respiratory distress  Breath sounds: Normal breath sounds  Abdominal:      General: Abdomen is flat  Bowel sounds are normal       Palpations: Abdomen is soft  Tenderness: There is no abdominal tenderness  Musculoskeletal:      Right lower leg: No edema  Left lower leg: No edema  Skin:     General: Skin is warm  Capillary Refill: Capillary refill takes less than 2 seconds  Neurological:      Mental Status: He is alert and oriented to person, place, and time  Psychiatric:         Mood and Affect: Mood normal          Behavior: Behavior normal          Additional Data:     Labs:  Results from last 7 days   Lab Units 10/11/22  0501   WBC Thousand/uL 5 12   HEMOGLOBIN g/dL 12 7   HEMATOCRIT % 36 3*   PLATELETS Thousands/uL 79*   NEUTROS PCT % 61   LYMPHS PCT % 20   MONOS PCT % 15*   EOS PCT % 2     Results from last 7 days   Lab Units 10/14/22  0446 10/11/22  0156 10/10/22  1922   POTASSIUM mmol/L 4 5   < > 5 2   CHLORIDE mmol/L 99   < > 84*   CO2 mmol/L 24   < > 22   BUN mg/dL 18   < > 21   CREATININE mg/dL 1 05   < > 2 95*   CALCIUM mg/dL 8 5   < > 8 5   ALK PHOS U/L  --   --  86   ALT U/L  --   --  52   AST U/L  --   --  66*    < > = values in this interval not displayed  Results from last 7 days   Lab Units 10/10/22  1839   INR  0 92     Results from last 7 days   Lab Units 10/14/22  0656 10/13/22  2155 10/13/22  1514 10/13/22  1111 10/13/22  0720   POC GLUCOSE mg/dl 93 180* 103 108 107           * I Have Reviewed All Lab Data Listed Above  * Additional Pertinent Lab Tests Reviewed:  All Labs Within Last 24 Hours Reviewed      Last 24 Hours Medication List:   Current Facility-Administered Medications   Medication Dose Route Frequency Provider Last Rate   • acetaminophen  975 mg Oral Q8H PRN Shayne Call MD     • albuterol  2 puff Inhalation Q4H PRN Shayne Call MD     • amLODIPine  2 5 mg Oral Daily Nicky Cummings MD     • benzonatate  100 mg Oral TID PRN Nicky Cummings MD     • chlordiazePOXIDE  50 mg Oral Q8H [address] Lois Kitchen, DO      Followed by   • [START ON 10/15/2022] chlordiazePOXIDE  25 mg Oral Q8H [address] Lois Kitchen, DO     • ferrous sulfate  325 mg Oral Daily With Breakfast Nicky Cummings MD     • fluticasone-vilanterol  1 puff Inhalation Daily Nicky Cummings MD     • folic acid  1 mg Oral Daily Mynor Agrawal MD     • heparin (porcine)  5,000 Units Subcutaneous [facility] Alice Beth MD     • insulin lispro  1-5 Units Subcutaneous 4x Daily (AC & HS) Mynor Agrawal MD     • lidocaine  1 patch Topical Daily Mynor Agrawal MD     • magnesium oxide  400 mg Oral BID Nicky Cummings MD     • magnesium sulfate  4 g Intravenous Once Lois Kitchen DO      Followed by   • magnesium sulfate  2 g Intravenous Once Iraida Murguia DO     • metoprolol tartrate  12 5 mg Oral Q12H Nicky Cummings MD     • multivitamin-minerals  1 tablet Oral Daily Mynor Agrawal MD     • nicotine  1 patch Transdermal Daily Nicky Cummings MD     • pravastatin  40 mg Oral Daily With Willy Moya MD     • ranolazine  500 mg Oral Q12H Nicky Cummings MD     • sodium chloride  100 mL/hr Intravenous Continuous Mynor Agrawal  mL/hr (10/14/22 0849)   • tamsulosin  0 4 mg Oral Daily Nicky Cummings MD     • thiamine  100 mg Oral Daily Nicky Cummings MD               ** Please Note: Dictation voice to text software may have been used in the creation of this document   **    Lois Kitchen  10/14/22  10:27 AM

## 2022-10-14 NOTE — QUICK NOTE
Pt expressed wanting to leave hospital against medical advice (AMA)  I spoke with him regarding the benefits of continuing his hospital admission including monitoring for hyponatremia resolution and alcohol withdrawal symptoms as patient continued to be tremulous on exam which is secondary to both acute conditions  I also spoke to patient about risks of leaving against medical advice, including but not limited to, worsening of his condition, altered mental status, increased tremors, respiratory depression, seizures, and even potentially death  Pt verbalized understanding of the benefits vs risks and still stated his desire to leave AMA  AMA form was completed and signed by both patient and myself           To be cosigned by Dr Bar Moeller

## 2022-10-14 NOTE — DISCHARGE SUMMARY
Discharge Summary - [address] [facility] Medicine Residency     Patient Information: Huong Monsivais 63 y o  male MRN: [id number]  Unit/Bed#: [address] Encounter: [id number]     Admitting Physician: Cyrus Gee DO  Discharging Physician/Practitioner: Cyrus Gee DO   PCP: Malinda Kelley MD  Admission Date:   Admission Orders (From admission, onward)     Ordered        10/10/22 2023  INPATIENT ADMISSION  Once                      Discharge Date: 10/14/22     Discharge Reason: Patient left AMA      Reason for Admission: Hypomagnesemia [E83 42]  Alcohol abuse [F10 10]  Hyponatremia [E87 1]  Eye swelling [H57 89]  ENMA (acute kidney injury) ([facility] [address] ) [N17 9]  Nasal bone fractures [S02  2XXA]     Discharge Diagnoses:      Principal Problem:    Alcohol withdrawal ([facility] [address] )  Active Problems:    Nasal bone fracture    COPD (chronic obstructive pulmonary disease) (HCC)    Type 2 diabetes mellitus (HCC)    Hypomagnesemia    CAD (coronary artery disease)    Thrombocytopenia (HCC)    Alcohol abuse    Hyponatremia    Chronic heart failure with preserved ejection fraction (HCC)    History of Atrial fibrillation (HCC)    Acute on chronic kidney failure (HCC)    Lumbar spondylitis (HCC)  Resolved Problems:    * No resolved hospital problems  *       Consultations During Hospital Stay:  · ENT      Procedures Performed:   · 10/10 CT Head w/o contrast - No acute intracranial abnormality   · 10/10 CT Cervical Spine w/o contrast - No cervical spine fracture or traumatic malalignment     · 10/10 CT Facial Bones w/o contrast - Nasal bone fracture   · 10/12 Xray Spine Lumbar - Lumbar spondylosis      Significant Findings / Test Results:   · 10/13:Na 129 urine osmolality: 308, Urine Na 88   · 10/12: Na 131  · 10/11: Na 123>125  · 10/10:  Mg 1 2, Na 123     Incidental Findings:   · 10/12 Xray Spine Lumbar - Lumbar spondylosis       Test Results Pending at Discharge (will require follow up):   N/A      Outpatient Tests Requested:  N/A      Outpatient follow-up Requested:  · Spine center as outpatient due to incidental finding of lumbar spondylosis     Complications:  N/A     Things to address at first visit after hospitalization   · Is patient still drinking alcohol? · Check BMP for Na levels   · Any signs of severe hyponatremia - seizures, respiratory depression   · Encourage tobacco cessation     HPI per H&P Admission Note   Kamlesh Escobar is a 63 y o  male who presents with nasal fracture secondary to fall  Patient states that he was drinking and tripped and fell into the door knob of an open door  He notes that his last drink was at 7:00 p m  10-9  The patient states that he drinks either a quart of hard liquor or a 12 pack of beer a day  Patient denies chills, fever, abdominal distension, chest pain, urinary symptoms, but he endorses nausea, vomiting, diarrhea, and shortness of breath secondary to tobacco use  CT imaging of the face revealed nasal bone fracture  Patient reports that he last took his medications yesterday morning, but does not recall which medications  Hospital Course:      Kamlesh Escobar is a 63 y o  male patient who originally presented to the hospital on 10/10/2022 due to alcohol intoxication and withdrawals  He was started on CIWA protocol and librium taper, repleted with thiamine, folic acid, and placed on fall and seizure precautions  His CIWA score was increasing as librium was decreased, so librium taper was reset to 50mg  Pt was also found to be hyponatremic, received 1L NS bolus in ED, IVF 100ml/hour, fluid restriction, and close Na monitoring  His magnesium levels were also below normal and repleted per protocol  He was also placed on telemetry due to electrolyte abnormalities  On admission, pt was found to have facial nasal bone fracture on CT  ENT was consulted and recommended to follow up outpatient       Pt was started on Amlodipine for CHF, nephrotoxic medications were held, and daily weights/intake/output was monitored  As his metformin was held due to being nephrotoxic, pt was started on ISS for diabetes management  Pt was found to have an incidental finding of lumbar spondylosis on imaging  He was recommended to follow up with spine center outpatient however, referral was not provided as patient left AMA  The rest of patients chronic conditions were managed with his home medications  Condition at Discharge: stable      Discharge Day Visit / Exam:      Vitals: Blood Pressure: 166/91 (10/14/22 1602)  Pulse: 72 (10/14/22 1602)  Temperature: 98 2 °F (36 8 °C) (10/14/22 1602)  Temp Source: Oral (10/13/22 0342)  Respirations: 18 (10/14/22 0807)  Height: 6' (182 9 cm) (10/10/22 1653)  Weight - Scale: 87 2 kg (192 lb 3 9 oz) (10/14/22 0600)  SpO2: 96 % (10/14/22 1602)  Exam:   Physical Exam  Physical exam completed and documented on Progress Note from 10/14/2022, the same day patient left AMA  Discharge instructions/Information to patient and family:   See after visit summary for information provided to patient and family  Discharge Medications:  Current Discharge Medication List          Current Discharge Medication List      CONTINUE these medications which have NOT CHANGED    Details   albuterol (Ventolin HFA) 90 mcg/act inhaler Inhale 2 puffs every 4 (four) hours as needed for wheezing  Qty: 18 g, Refills: 0    Comments: Substitution to a formulary equivalent within the same pharmaceutical class is authorized  Associated Diagnoses: COPD (chronic obstructive pulmonary disease) ([facility])      ferrous sulfate 324 (65 Fe) mg Take 1 tablet (324 mg total) by mouth 2 (two) times a day before meals  Qty: 60 tablet, Refills: 2    Associated Diagnoses: Alcohol abuse      fluticasone-vilanterol (Breo Ellipta) 200-25 MCG/INH inhaler Inhale 1 puff daily Rinse mouth after use    Qty: 60 each, Refills: 0    Associated Diagnoses: COPD (chronic obstructive pulmonary disease) ([address] ) folic acid (FOLVITE) 1 mg tablet Take 1 tablet (1,000 mcg total) by mouth daily  Qty: 90 tablet, Refills: 2    Associated Diagnoses: Alcohol abuse      gabapentin (NEURONTIN) 300 mg capsule TAKE ONE CAPSULE THREE TIMES DAILY  Qty: 90 capsule, Refills: 1    Associated Diagnoses: Edema, spinal cord (HCC)      lisinopril (ZESTRIL) 10 mg tablet Take 0 5 tablets (5 mg total) by mouth daily  Qty: 90 tablet, Refills: 0    Associated Diagnoses: Essential (primary) hypertension      magnesium Oxide (MAG-OX) 400 mg TABS Take 1 tablet (400 mg total) by mouth 2 (two) times a day  Qty: 120 tablet, Refills: 2    Associated Diagnoses: Alcohol abuse      metFORMIN (GLUCOPHAGE) 1000 MG tablet Take 1 tablet (1,000 mg total) by mouth every 12 (twelve) hours  Qty: 180 tablet, Refills: 0    Associated Diagnoses: Type 2 diabetes mellitus with hyperosmolarity without coma, without long-term current use of insulin (HCC)      metoprolol tartrate (LOPRESSOR) 25 mg tablet Take 0 5 tablets (12 5 mg total) by mouth every 12 (twelve) hours  Qty: 180 tablet, Refills: 2    Associated Diagnoses: Essential (primary) hypertension      omeprazole (PriLOSEC) 20 mg delayed release capsule Take 1 capsule (20 mg total) by mouth daily  Qty: 90 capsule, Refills: 3    Associated Diagnoses: Gastroesophageal reflux disease, unspecified whether esophagitis present      pravastatin (PRAVACHOL) 40 mg tablet Take 1 tablet (40 mg total) by mouth daily with dinner  Qty: 90 tablet, Refills: 2    Associated Diagnoses: Essential (primary) hypertension      ranolazine (RANEXA) 500 mg 12 hr tablet Take 1 tablet (500 mg total) by mouth every 12 (twelve) hours  Qty: 60 tablet, Refills: 3    Associated Diagnoses: Essential (primary) hypertension      tamsulosin (FLOMAX) 0 4 mg Take 1 capsule (0 4 mg total) by mouth daily  Qty: 90 capsule, Refills: 1    Associated Diagnoses: Hx of prostatic malignancy      thiamine 100 MG tablet Take 1 tablet (100 mg total) by mouth daily  Qty: 90 tablet, Refills: 0    Associated Diagnoses: Alcohol abuse      Blood Glucose Monitoring Suppl (ONE TOUCH ULTRA MINI) w/Device KIT Use as directed  Refills: 0      ONETOUCH DELICA LANCETS FINE MISC 3 (three) times a day Test  Refills: 0            Current Discharge Medication List         Current Discharge Medication List      STOP taking these medications       neomycin-polymyxin-dexamethasone (MAXITROL) ophthalmic suspension Comments:   Reason for Stopping:                 Disposition:   Home - AMA      For Discharges to [facility]:   ·       Not Applicable to this Patient - Not Applicable to this Patient     Discharge Statement:  I spent 30 minutes discharging the patient  This time was spent on the day of discharge  I had direct contact with the patient on the day of discharge  Greater than 50% of the total time was spent examining patient, answering all patient questions, arranging and discussing plan of care with patient as well as directly providing post-discharge instructions  Additional time then spent on discharge activities       ** Please Note: This note has been constructed using a voice recognition system Cindy Jones DO   10/14/22  5:35 PM

## 2022-10-14 NOTE — CASE MANAGEMENT
Case Management Discharge Planning Note    Patient name Radha Mckay  Location [facility] 204/2 [address] Fuentes MRN [id number]  : 1960 Date 10/14/2022       Current Admission Date: 10/10/2022  Current Admission Diagnosis:Alcohol withdrawal [facility])   Patient Active Problem List    Diagnosis Date Noted   • Lumbar spondylitis ([facility] [address] ) 10/13/2022   • Nasal bone fracture 10/10/2022   • Stage 3 chronic kidney disease ([facility] [address] ) 2022   • Fatty liver, alcoholic    • History of atrial fibrillation 10/25/2021   • Poor historian 10/24/2021   • Mild protein-calorie malnutrition ([facility] ) 2021   • Prostate cancer ([email or web address] ) 2021   • Acute on chronic kidney failure ([facility] )    • History of Atrial fibrillation ([facility] ) 2020   • Chronic heart failure with preserved ejection fraction ([facility] ) 2019   • Alcohol abuse 10/17/2019   • Hyponatremia 10/17/2019   • Cervical spinal stenosis 10/17/2019   • Thrombocytopenia ([facility] ) 2019   • Alcohol withdrawal ([email or web address] ) 2019   • History of prostate cancer 2019   • CAD (coronary artery disease) 2019   • Nicotine abuse 2019   • Hypomagnesemia 10/10/2018   • Depression, recurrent ([facility] ) 10/10/2018   • Hx of CABG 10/10/2018   • Dyslipidemia 10/10/2018   • COPD (chronic obstructive pulmonary disease) ([facility] ) 10/09/2018   • Type 2 diabetes mellitus ([facility] ) 10/09/2018      LOS (days): 4  Geometric Mean LOS (GMLOS) (days): 2 80  Days to GMLOS:-1     OBJECTIVE:  Risk of Unplanned Readmission Score: 16 57     Current admission status: Inpatient   Preferred Pharmacy:   [address] [place] [address] [address] [place] [address]  Phone: [phone] Fax: [phone]    Primary Care Provider: Hien Russell MD    Primary Insurance: MEDICARE  Secondary Insurance:     DISCHARGE DETAILS:    Discharge planning discussed with[de-identified] Patient  Freedom of Choice: Yes  Comments - Freedom of Choice: SW met with pt to review plans, recommendations and IMM  Pt's plan is to return home  Reviewed recommendation for home PT  Pt declined home care services  No other discharge needs expressed  Requested [address]         Is the patient interested in [facility] at discharge?: No    DME Referral Provided  Referral made for DME?: No    Other Referral/Resources/Interventions Provided:  Interventions: None Indicated    Treatment Team Recommendation: Home with [address]  Discharge Destination Plan[de-identified] Home  Transport at Discharge : Family, Other (Comment) ([facility]FT)    IMM Given (Date):: 10/14/22  IMM Given to[de-identified] Patient (IMM reveiwed with pt  Pt verbalized understanding  Pt signed IMM and copy given    Copy also placed in scan bin for chart )

## 2022-10-15 NOTE — NURSING NOTE
Patient expressed desire to leave against medical advice  Dr Jens Buitrago and Dr Ellen Burnham made aware  Dr Jens Buitrago educated patients about the risks of leaving against medical advice  Patient still expressed desire to leave  Patient signed AMA form  Patient discharged with all belongings  Patient's IV removed  Patient given AVS and educated about AVS  Patient verbalized understanding of AVS  Patient left [address] in stable condition  Nursing assistant watched patient leave hospital in cab

## 2022-10-15 NOTE — PLAN OF CARE
Problem: Potential for Falls  Goal: Patient will remain free of falls  Description: INTERVENTIONS:  - Educate patient/family on patient safety including physical limitations  - Instruct patient to call for assistance with activity   - Consult OT/PT to assist with strengthening/mobility   - Keep Call bell within reach  - Keep bed low and locked with side rails adjusted as appropriate  - Keep care items and personal belongings within reach  - Initiate and maintain comfort rounds  - Make Fall Risk Sign visible to staff  - Offer Toileting every 2 Hours, in advance of need  - Initiate/Maintain bed/chair alarm  - Obtain necessary fall risk management equipment: fall risk sign  - Apply yellow socks and bracelet for high fall risk patients  - Consider moving patient to room near nurses station  Outcome: Progressing     Problem: NEUROSENSORY - ADULT  Goal: Achieves stable or improved neurological status  Description: INTERVENTIONS  - Monitor and report changes in neurological status  - Monitor vital signs such as temperature, blood pressure, glucose, and any other labs ordered   - Initiate measures to prevent increased intracranial pressure  - Monitor for seizure activity and implement precautions if appropriate      Outcome: Progressing     Problem: CARDIOVASCULAR - ADULT  Goal: Maintains optimal cardiac output and hemodynamic stability  Description: INTERVENTIONS:  - Monitor I/O, vital signs and rhythm  - Monitor for S/S and trends of decreased cardiac output  - Administer and titrate ordered vasoactive medications to optimize hemodynamic stability  - Assess quality of pulses, skin color and temperature  - Assess for signs of decreased coronary artery perfusion  - Instruct patient to report change in severity of symptoms  Outcome: Progressing     Problem: MOBILITY - ADULT  Goal: Maintain or return to baseline ADL function  Description: INTERVENTIONS:  -  Assess patient's ability to carry out ADLs; assess patient's baseline for ADL function and identify physical deficits which impact ability to perform ADLs (bathing, care of mouth/teeth, toileting, grooming, dressing, etc )  - Assess/evaluate cause of self-care deficits   - Assess range of motion  - Assess patient's mobility; develop plan if impaired  - Assess patient's need for assistive devices and provide as appropriate  - Encourage maximum independence but intervene and supervise when necessary  - Involve family in performance of ADLs  - Assess for home care needs following discharge   - Consider OT consult to assist with ADL evaluation and planning for discharge  - Provide patient education as appropriate  Outcome: Progressing

## 2022-10-17 ENCOUNTER — OFFICE VISIT (OUTPATIENT)
Dept: FAMILY MEDICINE CLINIC | Facility: CLINIC | Age: 62
End: 2022-10-17
Payer: MEDICARE

## 2022-10-17 ENCOUNTER — TRANSITIONAL CARE MANAGEMENT (OUTPATIENT)
Dept: FAMILY MEDICINE CLINIC | Facility: CLINIC | Age: 62
End: 2022-10-17

## 2022-10-17 VITALS
BODY MASS INDEX: 26.19 KG/M2 | SYSTOLIC BLOOD PRESSURE: 130 MMHG | HEIGHT: 72 IN | RESPIRATION RATE: 22 BRPM | WEIGHT: 193.4 LBS | HEART RATE: 82 BPM | OXYGEN SATURATION: 97 % | DIASTOLIC BLOOD PRESSURE: 60 MMHG | TEMPERATURE: 98.6 F

## 2022-10-17 DIAGNOSIS — G95.19 EDEMA, SPINAL CORD (HCC): ICD-10-CM

## 2022-10-17 DIAGNOSIS — E83.42 HYPOMAGNESEMIA: ICD-10-CM

## 2022-10-17 DIAGNOSIS — Z00.00 MEDICARE ANNUAL WELLNESS VISIT, INITIAL: Primary | ICD-10-CM

## 2022-10-17 DIAGNOSIS — E87.1 HYPONATREMIA: ICD-10-CM

## 2022-10-17 DIAGNOSIS — Z74.8 ASSISTANCE NEEDED WITH TRANSPORTATION: ICD-10-CM

## 2022-10-17 DIAGNOSIS — F33.9 DEPRESSION, RECURRENT (HCC): ICD-10-CM

## 2022-10-17 DIAGNOSIS — E11.00 TYPE 2 DIABETES MELLITUS WITH HYPEROSMOLARITY WITHOUT COMA, WITHOUT LONG-TERM CURRENT USE OF INSULIN (HCC): ICD-10-CM

## 2022-10-17 DIAGNOSIS — J44.9 COPD (CHRONIC OBSTRUCTIVE PULMONARY DISEASE) (HCC): ICD-10-CM

## 2022-10-17 DIAGNOSIS — F10.10 ALCOHOL ABUSE: ICD-10-CM

## 2022-10-17 LAB — SL AMB POCT HEMOGLOBIN AIC: 5.1 (ref ?–6.5)

## 2022-10-17 PROCEDURE — G0438 PPPS, INITIAL VISIT: HCPCS | Performed by: FAMILY MEDICINE

## 2022-10-17 PROCEDURE — 83036 HEMOGLOBIN GLYCOSYLATED A1C: CPT | Performed by: FAMILY MEDICINE

## 2022-10-17 RX ORDER — GABAPENTIN 300 MG/1
CAPSULE ORAL
Qty: 90 CAPSULE | Refills: 1 | Status: SHIPPED | OUTPATIENT
Start: 2022-10-17

## 2022-10-17 NOTE — PROGRESS NOTES
Assessment and Plan:     Problem List Items Addressed This Visit        Endocrine    Type 2 diabetes mellitus ([facility] ) (Chronic)    Relevant Medications    metFORMIN (GLUCOPHAGE) 1000 MG tablet    Other Relevant Orders    POCT hemoglobin A1c (Completed)    IRIS Diabetic eye exam       Other    Alcohol abuse (Chronic)    Relevant Orders    Ambulatory Referral to Social Work Care Management Program    Hypomagnesemia    Relevant Orders    Magnesium    Depression, recurrent ([facility] )    Hyponatremia    Relevant Orders    Basic metabolic panel      Other Visit Diagnoses     Medicare annual wellness visit, initial    -  Primary    Assistance needed with transportation        Relevant Orders    Ambulatory Referral to Social Work Care Management Program        Extensive counseling on alcohol and smoking cessation provided  Patient has been sober for about a week now however has continued to smoke  Provided patient with resources on groups hot lines to further assess patient  Referral to Case Management place to further aid patient as well as help patient with transportation issues that he has been facing  Patient had low sodium and magnesium in hospital   Will recheck on labs  Patient's PHQ 9 in office 13  Patient states he does not feel depressed or have feelings of anhedonia  Patient is planning to follow-up with [facility] soon  Denies any thoughts of hurting himself or others  Patient has history of diabetes  A1c in office today 5 1  Will decrease metformin 1000 mg b i d  to a 1000 mg daily  BMI Counseling: Body mass index is 26 23 kg/m²  The BMI is above normal  Nutrition recommendations include reducing portion sizes, 3-5 servings of fruits/vegetables daily, reducing fast food intake and consuming healthier snacks  Exercise recommendations include moderate aerobic physical activity for 150 minutes/week and exercising 3-5 times per week         Preventive health issues were discussed with patient, and age appropriate screening tests were ordered as noted in patient's After Visit Summary  Personalized health advice and appropriate referrals for health education or preventive services given if needed, as noted in patient's After Visit Summary  History of Present Illness:     Patient presents for a Medicare Wellness Visit    HPI   Here today for Medicare wellness visit  Patient was really hospitalized for fall and alcohol abuse and left the hospital AMA  The patient states he is doing well and has not had a drink since Sunday (10/9) night  Patient has been staying sober and exercising daily  Patient Care Team:  Malinda Kelley MD as PCP - General (Family Medicine)  Regine Simmons CM as RN Care Manager (Care Coordination)  Pippa Espinoza DO (Family Medicine)     Review of Systems:     Review of Systems   Constitutional: Negative for chills and fever  HENT: Negative for sore throat  Respiratory: Negative for cough and shortness of breath  Cardiovascular: Negative for chest pain and palpitations  Gastrointestinal: Negative for abdominal pain, constipation, diarrhea, nausea and vomiting  Genitourinary: Negative for difficulty urinating and dysuria  Neurological: Negative for dizziness and headaches          Problem List:     Patient Active Problem List   Diagnosis   • COPD (chronic obstructive pulmonary disease) ([facility] )   • Type 2 diabetes mellitus ([facility] )   • Hypomagnesemia   • Depression, recurrent ([facility] )   • Hx of CABG   • Dyslipidemia   • Alcohol withdrawal ([facility] )   • History of prostate cancer   • CAD (coronary artery disease)   • Nicotine abuse   • Thrombocytopenia (HCC)   • Alcohol abuse   • Hyponatremia   • Cervical spinal stenosis   • Chronic heart failure with preserved ejection fraction (HCC)   • History of Atrial fibrillation (HCC)   • Acute on chronic kidney failure (HCC)   • Mild protein-calorie malnutrition ([facility] )   • Prostate cancer ([facility] )   • Poor historian   • History of atrial fibrillation   • Fatty liver, alcoholic   • Stage 3 chronic kidney disease ([facility] )   • Nasal bone fracture   • Lumbar spondylitis [facility])      Past Medical and Surgical History:     Past Medical History:   Diagnosis Date   • Cancer [facility])     prostate ca,had radiation   • Cardiac disease     stents,then triple bypass   • COPD (chronic obstructive pulmonary disease) ([facility] )    • ETOH abuse    • Hx of heart artery stent     2014   • Hyperlipidemia    • Hypertension    • Hypovolemic shock ([place] [address] ) 12/22/2019   • Prostate CA ([facility] )    • S/P CABG x 1     2004     Past Surgical History:   Procedure Laterality Date   • CORONARY ARTERY BYPASS GRAFT  2004   • GA ARTHRODESIS ANT INTERBODY MIN DISCECTOMY, CERVICAL BELOW C2 N/A 12/16/2020    Procedure: Anterior cervical discectomy with fusion C4-C7; Posterior cervical decompression and fusion C2-T2;  Surgeon: Douglas Valdovinos MD;  Location: [facility];  Service: Neurosurgery   • TONSILLECTOMY        Family History:     Family History   Problem Relation Age of Onset   • Diabetes Mother    • Uterine cancer Mother    • COPD Father    • Hypertension Father       Social History:     Social History     Socioeconomic History   • Marital status: Single     Spouse name: None   • Number of children: None   • Years of education: None   • Highest education level: None   Occupational History   • None   Tobacco Use   • Smoking status: Current Every Day Smoker     Packs/day: 1 50     Years: 40 00     Pack years: 60 00     Types: Cigarettes   • Smokeless tobacco: Never Used   Vaping Use   • Vaping Use: Never used   Substance and Sexual Activity   • Alcohol use:  Yes     Alcohol/week: 10 0 standard drinks     Types: 10 Shots of liquor per week     Comment: last drink friday   • Drug use: No   • Sexual activity: Not Currently   Other Topics Concern   • None   Social History Narrative   • None     Social Determinants of Health     Financial Resource Strain: Medium Risk   • Difficulty of Paying Living Expenses: Somewhat hard   Food Insecurity: No Food Insecurity   • Worried About Running Out of Food in the Last Year: Never true   • Ran Out of Food in the Last Year: Never true   Transportation Needs: Unmet Transportation Needs   • Lack of Transportation (Medical): Yes   • Lack of Transportation (Non-Medical): Yes   Physical Activity: Not on file   Stress: Not on file   Social Connections: Not on file   Intimate Partner Violence: Not on file   Housing Stability: High Risk   • Unable to Pay for Housing in the Last Year: No   • Number of Places Lived in the Last Year: 1   • Unstable Housing in the Last Year: Yes      Medications and Allergies:     Current Outpatient Medications   Medication Sig Dispense Refill   • albuterol (Ventolin HFA) 90 mcg/act inhaler Inhale 2 puffs every 4 (four) hours as needed for wheezing 18 g 0   • Blood Glucose Monitoring Suppl (ONE TOUCH ULTRA MINI) w/Device KIT Use as directed  0   • Breo Ellipta 200-25 MCG/INH inhaler INHALE 1 PUFF DAILY RINSE MOUTH AFTER USE   60 each 0   • ferrous sulfate 324 (65 Fe) mg Take 1 tablet (324 mg total) by mouth 2 (two) times a day before meals 60 tablet 2   • folic acid (FOLVITE) 1 mg tablet Take 1 tablet (1,000 mcg total) by mouth daily 90 tablet 2   • gabapentin (NEURONTIN) 300 mg capsule TAKE ONE CAPSULE THREE TIMES DAILY 90 capsule 1   • lisinopril (ZESTRIL) 10 mg tablet Take 0 5 tablets (5 mg total) by mouth daily 90 tablet 0   • magnesium Oxide (MAG-OX) 400 mg TABS Take 1 tablet (400 mg total) by mouth 2 (two) times a day 120 tablet 2   • metFORMIN (GLUCOPHAGE) 1000 MG tablet Take 1 tablet (1,000 mg total) by mouth daily with breakfast 180 tablet 0   • metoprolol tartrate (LOPRESSOR) 25 mg tablet Take 0 5 tablets (12 5 mg total) by mouth every 12 (twelve) hours 180 tablet 2   • omeprazole (PriLOSEC) 20 mg delayed release capsule Take 1 capsule (20 mg total) by mouth daily 90 capsule 3   • ONETOUCH DELICA LANCETS FINE MISC 3 (three) times a day Test 0   • pravastatin (PRAVACHOL) 40 mg tablet Take 1 tablet (40 mg total) by mouth daily with dinner 90 tablet 2   • ranolazine (RANEXA) 500 mg 12 hr tablet Take 1 tablet (500 mg total) by mouth every 12 (twelve) hours 60 tablet 3   • tamsulosin (FLOMAX) 0 4 mg Take 1 capsule (0 4 mg total) by mouth daily 90 capsule 1   • thiamine 100 MG tablet Take 1 tablet (100 mg total) by mouth daily 90 tablet 0     No current facility-administered medications for this visit  No Known Allergies   Immunizations:     Immunization History   Administered Date(s) Administered   • COVID-19 MODERNA VACC 0 25 ML IM BOOSTER 01/21/2022   • COVID-19 MODERNA VACC 0 5 ML IM 05/14/2021, 06/14/2021   • INFLUENZA 10/11/2022   • Influenza, recombinant, quadrivalent,injectable, preservative free 10/17/2019, 09/23/2020, 02/10/2022, 10/11/2022   • Pneumococcal Polysaccharide PPV23 04/26/2021   • Tdap 02/27/2020, 12/05/2020      Health Maintenance:         Topic Date Due   • Colorectal Cancer Screening  10/17/2020   • Lung Cancer Screening  03/11/2023   • HIV Screening  Completed   • Hepatitis C Screening  Completed         Topic Date Due   • Hepatitis A Vaccine (1 of 2 - Risk 2-dose series) Never done   • Hepatitis B Vaccine (1 of 3 - Risk 3-dose series) Never done   • COVID-19 Vaccine (4 - Booster for Donavan Sparks series) 05/21/2022      Medicare Screening Tests and Risk Assessments:     Jaden Padilla is here for his Initial Wellness visit  Health Risk Assessment:   Patient rates overall health as fair  Patient feels that their physical health rating is slightly worse  Patient is satisfied with their life  Eyesight was rated as much worse  Hearing was rated as same  Patient feels that their emotional and mental health rating is slightly better  Patients states they are never, rarely angry  Patient states they are often unusually tired/fatigued  Pain experienced in the last 7 days has been a lot  Patient's pain rating has been 6/10   Patient states that he has experienced no weight loss or gain in last 6 months  Depression Screening:   PHQ-9 Score: 14      Fall Risk Screening: In the past year, patient has experienced: history of falling in past year    Number of falls: 2 or more  Injured during fall?: Yes    Feels unsteady when standing or walking?: Yes    Worried about falling?: Yes      Home Safety:  Patient does not have trouble with stairs inside or outside of their home  Patient has working smoke alarms and has working carbon monoxide detector  Home safety hazards include: none  Nutrition:   Current diet is Diabetic and No Added Salt  Medications:   Patient is currently taking over-the-counter supplements  OTC medications include: see medication list  Patient is able to manage medications  Activities of Daily Living (ADLs)/Instrumental Activities of Daily Living (IADLs):   Walk and transfer into and out of bed and chair?: Yes  Dress and groom yourself?: Yes    Bathe or shower yourself?: Yes    Feed yourself?  Yes  Do your laundry/housekeeping?: Yes  Manage your money, pay your bills and track your expenses?: Yes  Make your own meals?: Yes    Do your own shopping?: Yes    Previous Hospitalizations:   Any hospitalizations or ED visits within the last 12 months?: Yes    How many hospitalizations have you had in the last year?: 3-4    Advance Care Planning:     Five wishes given: Yes      PREVENTIVE SCREENINGS      Cardiovascular Screening:    General: Screening Not Indicated and History Lipid Disorder      Diabetes Screening:     General: Screening Not Indicated and History Diabetes      Prostate Cancer Screening:    General: History Prostate Cancer      Osteoporosis Screening:    General: Screening Not Indicated      Abdominal Aortic Aneurysm (AAA) Screening:    Risk factors include: tobacco use        Lung Cancer Screening:     General: Screening Current      Hepatitis C Screening:    General: Screening Current    Screening, Brief Intervention, and Referral to Treatment (SBIRT)    Screening  Typical number of drinks in a day: 10  Typical number of drinks in a week: 50  Interpretation: Risky drinking behavior  AUDIT-C Screenin) How often did you have a drink containing alcohol in the past year? 4 or more times a week  2) How many drinks did you have on a typical day when you were drinking in the past year? 10 or more  3) How often did you have 6 or more drinks on one occasion in the past year? daily or almost daily    AUDIT-C Score: 8  Interpretation: Score 4-12 (male): POSITIVE screen for alcohol misuse    AUDIT Screenin) How often during the last year have you found that you were not able to stop drinking once you had started? 4 - daily or almost daily  5) How often during the last year have you failed to do what was normally expected from you because of drinking? 0 - never  6) How often during the last year have you needed a first drink in the morning to get yourself going after a heavy drinking session? 3 - weekly  7) How often during the last year have you had a feeling of guilt or remorse after drinking? 4 - daily or almost daily  8) How often during the last year have you been unable to remember what happened the night before because you had been drinking? 1 - less than monthly  9) Have you or someone else been injured as a result of your drinking? 4 - yes, during the last year  8) Has a relative or friend or a doctor or another health worker been concerned about your drinking or suggested you cut down?  4 - yes, during the last year    AUDIT Score: 28  Interpretation: High risk alcohol consumption; likely alcohol dependence    Single Item Drug Screening:  How often have you used an illegal drug (including marijuana) or a prescription medication for non-medical reasons in the past year? never    Single Item Drug Screen Score: 0  Interpretation: Negative screen for possible drug use disorder    Brief Intervention  Healthy alcohol use/limits discussed  Alcohol cessation counseling given  Recommended patient attend alcoholics anonymous  No exam data present     Physical Exam:     /60 (BP Location: Left arm, Patient Position: Sitting, Cuff Size: Standard)   Pulse 82   Temp 98 6 °F (37 °C) (Tympanic)   Resp 22   Ht 6' (1 829 m)   Wt 87 7 kg (193 lb 6 4 oz)   SpO2 97%   BMI 26 23 kg/m²     Physical Exam  Constitutional:       Appearance: Normal appearance  HENT:      Head: Normocephalic and atraumatic  Cardiovascular:      Rate and Rhythm: Normal rate and regular rhythm  Heart sounds: Normal heart sounds  No murmur heard  Pulmonary:      Breath sounds: Normal breath sounds  No wheezing  Abdominal:      Palpations: Abdomen is soft  Tenderness: There is no abdominal tenderness  Musculoskeletal:      Right lower leg: No edema  Left lower leg: No edema  Neurological:      Mental Status: He is alert            Jose Max MD

## 2022-10-18 ENCOUNTER — PATIENT OUTREACH (OUTPATIENT)
Dept: FAMILY MEDICINE CLINIC | Facility: CLINIC | Age: 62
End: 2022-10-18

## 2022-10-18 ENCOUNTER — APPOINTMENT (OUTPATIENT)
Dept: LAB | Facility: HOSPITAL | Age: 62
End: 2022-10-18
Payer: MEDICARE

## 2022-10-18 ENCOUNTER — HOSPITAL ENCOUNTER (EMERGENCY)
Facility: HOSPITAL | Age: 62
Discharge: HOME | End: 2022-10-18
Attending: EMERGENCY MEDICINE
Payer: MEDICARE

## 2022-10-18 VITALS
SYSTOLIC BLOOD PRESSURE: 174 MMHG | DIASTOLIC BLOOD PRESSURE: 85 MMHG | RESPIRATION RATE: 19 BRPM | HEART RATE: 67 BPM | TEMPERATURE: 97.9 F | OXYGEN SATURATION: 94 %

## 2022-10-18 DIAGNOSIS — K21.9 GERD (GASTROESOPHAGEAL REFLUX DISEASE): ICD-10-CM

## 2022-10-18 DIAGNOSIS — E83.42 HYPOMAGNESEMIA: ICD-10-CM

## 2022-10-18 DIAGNOSIS — E87.1 HYPONATREMIA: ICD-10-CM

## 2022-10-18 DIAGNOSIS — R07.9 CHEST PAIN, UNSPECIFIED: Primary | ICD-10-CM

## 2022-10-18 LAB
2HR DELTA HS TROPONIN: 0 NG/L
4HR DELTA HS TROPONIN: 1 NG/L
ALBUMIN SERPL BCP-MCNC: 3.5 G/DL (ref 3.5–5)
ALP SERPL-CCNC: 64 U/L (ref 46–116)
ALT SERPL W P-5'-P-CCNC: 76 U/L (ref 12–78)
AMPHETAMINES SERPL QL SCN: NEGATIVE
ANION GAP SERPL CALCULATED.3IONS-SCNC: 4 MMOL/L (ref 4–13)
ANION GAP SERPL CALCULATED.3IONS-SCNC: 6 MMOL/L (ref 4–13)
ANION GAP SERPL CALCULATED.3IONS-SCNC: 8 MMOL/L (ref 4–13)
AST SERPL W P-5'-P-CCNC: 37 U/L (ref 5–45)
ATRIAL RATE: 81 BPM
BARBITURATES UR QL: NEGATIVE
BASOPHILS # BLD AUTO: 0.07 THOUSANDS/ÂΜL (ref 0–0.1)
BASOPHILS NFR BLD AUTO: 1 % (ref 0–1)
BENZODIAZ UR QL: POSITIVE
BILIRUB SERPL-MCNC: 0.31 MG/DL (ref 0.2–1)
BILIRUB UR QL STRIP: NEGATIVE
BUN SERPL-MCNC: 15 MG/DL (ref 5–25)
BUN SERPL-MCNC: 17 MG/DL (ref 5–25)
BUN SERPL-MCNC: 18 MG/DL (ref 5–25)
CALCIUM SERPL-MCNC: 8.7 MG/DL (ref 8.3–10.1)
CALCIUM SERPL-MCNC: 9.2 MG/DL (ref 8.3–10.1)
CALCIUM SERPL-MCNC: 9.3 MG/DL (ref 8.3–10.1)
CARDIAC TROPONIN I PNL SERPL HS: 49 NG/L
CARDIAC TROPONIN I PNL SERPL HS: 49 NG/L
CARDIAC TROPONIN I PNL SERPL HS: 50 NG/L
CHLORIDE SERPL-SCNC: 102 MMOL/L (ref 96–108)
CHLORIDE SERPL-SCNC: 98 MMOL/L (ref 96–108)
CHLORIDE SERPL-SCNC: 98 MMOL/L (ref 96–108)
CLARITY UR: CLEAR
CO2 SERPL-SCNC: 24 MMOL/L (ref 21–32)
CO2 SERPL-SCNC: 25 MMOL/L (ref 21–32)
CO2 SERPL-SCNC: 26 MMOL/L (ref 21–32)
COCAINE UR QL: NEGATIVE
COLOR UR: YELLOW
CREAT SERPL-MCNC: 1.09 MG/DL (ref 0.6–1.3)
CREAT SERPL-MCNC: 1.28 MG/DL (ref 0.6–1.3)
CREAT SERPL-MCNC: 1.3 MG/DL (ref 0.6–1.3)
EOSINOPHIL # BLD AUTO: 0.11 THOUSAND/ÂΜL (ref 0–0.61)
EOSINOPHIL NFR BLD AUTO: 2 % (ref 0–6)
ERYTHROCYTE [DISTWIDTH] IN BLOOD BY AUTOMATED COUNT: 14.5 % (ref 11.6–15.1)
ETHANOL SERPL-MCNC: <3 MG/DL (ref 0–3)
GFR SERPL CREATININE-BSD FRML MDRD: 59 ML/MIN/1.73SQ M
GFR SERPL CREATININE-BSD FRML MDRD: 60 ML/MIN/1.73SQ M
GFR SERPL CREATININE-BSD FRML MDRD: 73 ML/MIN/1.73SQ M
GLUCOSE SERPL-MCNC: 104 MG/DL (ref 65–140)
GLUCOSE SERPL-MCNC: 105 MG/DL (ref 65–140)
GLUCOSE SERPL-MCNC: 116 MG/DL (ref 65–140)
GLUCOSE UR STRIP-MCNC: NEGATIVE MG/DL
HCT VFR BLD AUTO: 36.8 % (ref 36.5–49.3)
HGB BLD-MCNC: 12.4 G/DL (ref 12–17)
HGB UR QL STRIP.AUTO: NEGATIVE
IMM GRANULOCYTES # BLD AUTO: 0.02 THOUSAND/UL (ref 0–0.2)
IMM GRANULOCYTES NFR BLD AUTO: 0 % (ref 0–2)
KETONES UR STRIP-MCNC: NEGATIVE MG/DL
LEUKOCYTE ESTERASE UR QL STRIP: NEGATIVE
LIPASE SERPL-CCNC: 438 U/L (ref 73–393)
LYMPHOCYTES # BLD AUTO: 1.25 THOUSANDS/ÂΜL (ref 0.6–4.47)
LYMPHOCYTES NFR BLD AUTO: 19 % (ref 14–44)
MAGNESIUM SERPL-MCNC: 1.5 MG/DL (ref 1.6–2.6)
MAGNESIUM SERPL-MCNC: 1.5 MG/DL (ref 1.6–2.6)
MCH RBC QN AUTO: 33.4 PG (ref 26.8–34.3)
MCHC RBC AUTO-ENTMCNC: 33.7 G/DL (ref 31.4–37.4)
MCV RBC AUTO: 99 FL (ref 82–98)
METHADONE UR QL: NEGATIVE
MONOCYTES # BLD AUTO: 1.42 THOUSAND/ÂΜL (ref 0.17–1.22)
MONOCYTES NFR BLD AUTO: 22 % (ref 4–12)
NEUTROPHILS # BLD AUTO: 3.57 THOUSANDS/ÂΜL (ref 1.85–7.62)
NEUTS SEG NFR BLD AUTO: 56 % (ref 43–75)
NITRITE UR QL STRIP: NEGATIVE
NRBC BLD AUTO-RTO: 0 /100 WBCS
OPIATES UR QL SCN: NEGATIVE
OXYCODONE+OXYMORPHONE UR QL SCN: NEGATIVE
P AXIS: 67 DEGREES
PCP UR QL: NEGATIVE
PH UR STRIP.AUTO: 6.5 [PH]
PLATELET # BLD AUTO: 207 THOUSANDS/UL (ref 149–390)
PMV BLD AUTO: 10.4 FL (ref 8.9–12.7)
POTASSIUM SERPL-SCNC: 4 MMOL/L (ref 3.5–5.3)
POTASSIUM SERPL-SCNC: 4.6 MMOL/L (ref 3.5–5.3)
POTASSIUM SERPL-SCNC: 4.7 MMOL/L (ref 3.5–5.3)
PR INTERVAL: 122 MS
PROT SERPL-MCNC: 7.6 G/DL (ref 6.4–8.4)
PROT UR STRIP-MCNC: NEGATIVE MG/DL
QRS AXIS: 73 DEGREES
QRSD INTERVAL: 84 MS
QT INTERVAL: 350 MS
QTC INTERVAL: 406 MS
RBC # BLD AUTO: 3.71 MILLION/UL (ref 3.88–5.62)
SODIUM SERPL-SCNC: 129 MMOL/L (ref 135–147)
SODIUM SERPL-SCNC: 130 MMOL/L (ref 135–147)
SODIUM SERPL-SCNC: 132 MMOL/L (ref 135–147)
SP GR UR STRIP.AUTO: <=1.005 (ref 1–1.03)
T WAVE AXIS: 59 DEGREES
THC UR QL: NEGATIVE
UROBILINOGEN UR QL STRIP.AUTO: 0.2 E.U./DL
VENTRICULAR RATE: 81 BPM
WBC # BLD AUTO: 6.44 THOUSAND/UL (ref 4.31–10.16)

## 2022-10-18 PROCEDURE — 96374 THER/PROPH/DIAG INJ IV PUSH: CPT

## 2022-10-18 PROCEDURE — 93005 ELECTROCARDIOGRAM TRACING: CPT

## 2022-10-18 PROCEDURE — 84484 ASSAY OF TROPONIN QUANT: CPT | Performed by: EMERGENCY MEDICINE

## 2022-10-18 PROCEDURE — 96365 THER/PROPH/DIAG IV INF INIT: CPT

## 2022-10-18 PROCEDURE — 80048 BASIC METABOLIC PNL TOTAL CA: CPT

## 2022-10-18 PROCEDURE — 99285 EMERGENCY DEPT VISIT HI MDM: CPT

## 2022-10-18 PROCEDURE — 80053 COMPREHEN METABOLIC PANEL: CPT | Performed by: EMERGENCY MEDICINE

## 2022-10-18 PROCEDURE — 99285 EMERGENCY DEPT VISIT HI MDM: CPT | Performed by: EMERGENCY MEDICINE

## 2022-10-18 PROCEDURE — 80048 BASIC METABOLIC PNL TOTAL CA: CPT | Performed by: EMERGENCY MEDICINE

## 2022-10-18 PROCEDURE — 93010 ELECTROCARDIOGRAM REPORT: CPT | Performed by: INTERNAL MEDICINE

## 2022-10-18 PROCEDURE — 83735 ASSAY OF MAGNESIUM: CPT

## 2022-10-18 PROCEDURE — C9113 INJ PANTOPRAZOLE SODIUM, VIA: HCPCS | Performed by: EMERGENCY MEDICINE

## 2022-10-18 PROCEDURE — 83735 ASSAY OF MAGNESIUM: CPT | Performed by: EMERGENCY MEDICINE

## 2022-10-18 PROCEDURE — 83690 ASSAY OF LIPASE: CPT | Performed by: EMERGENCY MEDICINE

## 2022-10-18 PROCEDURE — 36415 COLL VENOUS BLD VENIPUNCTURE: CPT

## 2022-10-18 PROCEDURE — 81003 URINALYSIS AUTO W/O SCOPE: CPT | Performed by: EMERGENCY MEDICINE

## 2022-10-18 PROCEDURE — 82077 ASSAY SPEC XCP UR&BREATH IA: CPT | Performed by: EMERGENCY MEDICINE

## 2022-10-18 PROCEDURE — 85025 COMPLETE CBC W/AUTO DIFF WBC: CPT | Performed by: EMERGENCY MEDICINE

## 2022-10-18 PROCEDURE — 96375 TX/PRO/DX INJ NEW DRUG ADDON: CPT

## 2022-10-18 PROCEDURE — 80307 DRUG TEST PRSMV CHEM ANLYZR: CPT | Performed by: EMERGENCY MEDICINE

## 2022-10-18 RX ORDER — FAMOTIDINE 10 MG/ML
40 INJECTION, SOLUTION INTRAVENOUS ONCE
Status: COMPLETED | OUTPATIENT
Start: 2022-10-18 | End: 2022-10-18

## 2022-10-18 RX ORDER — LISINOPRIL 5 MG/1
5 TABLET ORAL ONCE
Status: COMPLETED | OUTPATIENT
Start: 2022-10-18 | End: 2022-10-18

## 2022-10-18 RX ORDER — METOPROLOL TARTRATE 5 MG/5ML
5 INJECTION INTRAVENOUS ONCE
Status: COMPLETED | OUTPATIENT
Start: 2022-10-18 | End: 2022-10-18

## 2022-10-18 RX ORDER — FAMOTIDINE 20 MG/1
20 TABLET, FILM COATED ORAL 2 TIMES DAILY
Qty: 30 TABLET | Refills: 0 | Status: SHIPPED | OUTPATIENT
Start: 2022-10-18

## 2022-10-18 RX ORDER — PANTOPRAZOLE SODIUM 40 MG/10ML
40 INJECTION, POWDER, LYOPHILIZED, FOR SOLUTION INTRAVENOUS ONCE
Status: COMPLETED | OUTPATIENT
Start: 2022-10-18 | End: 2022-10-18

## 2022-10-18 RX ORDER — MAGNESIUM SULFATE HEPTAHYDRATE 40 MG/ML
2 INJECTION, SOLUTION INTRAVENOUS ONCE
Status: COMPLETED | OUTPATIENT
Start: 2022-10-18 | End: 2022-10-18

## 2022-10-18 RX ADMIN — LISINOPRIL 5 MG: 5 TABLET ORAL at 13:43

## 2022-10-18 RX ADMIN — PANTOPRAZOLE SODIUM 40 MG: 40 INJECTION, POWDER, FOR SOLUTION INTRAVENOUS at 11:12

## 2022-10-18 RX ADMIN — MAGNESIUM SULFATE HEPTAHYDRATE 2 G: 40 INJECTION, SOLUTION INTRAVENOUS at 11:12

## 2022-10-18 RX ADMIN — FAMOTIDINE 40 MG: 10 INJECTION, SOLUTION INTRAVENOUS at 11:13

## 2022-10-18 RX ADMIN — METOPROLOL TARTRATE 5 MG: 5 INJECTION INTRAVENOUS at 10:20

## 2022-10-18 RX ADMIN — SODIUM CHLORIDE 1000 ML: 0.9 INJECTION, SOLUTION INTRAVENOUS at 11:07

## 2022-10-18 NOTE — ED PROVIDER NOTES
History  Chief Complaint   Patient presents with   • Chest Pain     Pt reports intermittent chest pain onset last night while walking     62-year-old male with past history of MI status post cardiac stents, hypertension, GERD, hyperlipidemia, COPD, CABG, heavy alcohol abuse, presents to the ED for evaluation of intermittent burning sensation in the epigastric region radiating to his chest since 1:00 a m  This morning  Patient states that he had meatloaf for dinner earlier in the evening last night  Patient again had another serving before going to bed  Patient then woke up with these burning sensation  Discomfort is intermittent  Patient currently has no chest discomfort  Patient came to the ED for further evaluation as he has history of cardiovascular events  Patient denies any fevers or chills  Patient is currently followed by GI for his GERD  Patient is compliant with all his medications  Patient states that he stopped drinking alcohol since the slides admission  History provided by:  Patient      Prior to Admission Medications   Prescriptions Last Dose Informant Patient Reported? Taking? Blood Glucose Monitoring Suppl (ONE TOUCH ULTRA MINI) w/Device KIT  Self Yes No   Sig: Use as directed   Breo Ellipta 200-25 MCG/INH inhaler   No No   Sig: INHALE 1 PUFF DAILY RINSE MOUTH AFTER USE     ONETOUCH DELICA LANCETS FINE MISC  Self Yes No   Sig: 3 (three) times a day Test   albuterol (Ventolin HFA) 90 mcg/act inhaler  Self No No   Sig: Inhale 2 puffs every 4 (four) hours as needed for wheezing   ferrous sulfate 324 (65 Fe) mg  Self No No   Sig: Take 1 tablet (324 mg total) by mouth 2 (two) times a day before meals   folic acid (FOLVITE) 1 mg tablet  Self No No   Sig: Take 1 tablet (1,000 mcg total) by mouth daily   gabapentin (NEURONTIN) 300 mg capsule   No No   Sig: TAKE ONE CAPSULE THREE TIMES DAILY   lisinopril (ZESTRIL) 10 mg tablet  Self No No   Sig: Take 0 5 tablets (5 mg total) by mouth daily magnesium Oxide (MAG-OX) 400 mg TABS  Self No No   Sig: Take 1 tablet (400 mg total) by mouth 2 (two) times a day   metFORMIN (GLUCOPHAGE) 1000 MG tablet   No No   Sig: Take 1 tablet (1,000 mg total) by mouth daily with breakfast   metoprolol tartrate (LOPRESSOR) 25 mg tablet  Self No No   Sig: Take 0 5 tablets (12 5 mg total) by mouth every 12 (twelve) hours   omeprazole (PriLOSEC) 20 mg delayed release capsule  Self No No   Sig: Take 1 capsule (20 mg total) by mouth daily   pravastatin (PRAVACHOL) 40 mg tablet  Self No No   Sig: Take 1 tablet (40 mg total) by mouth daily with dinner   ranolazine (RANEXA) 500 mg 12 hr tablet  Self No No   Sig: Take 1 tablet (500 mg total) by mouth every 12 (twelve) hours   tamsulosin (FLOMAX) 0 4 mg  Self No No   Sig: Take 1 capsule (0 4 mg total) by mouth daily   thiamine 100 MG tablet  Self No No   Sig: Take 1 tablet (100 mg total) by mouth daily      Facility-Administered Medications: None       Past Medical History:   Diagnosis Date   • Cancer ([facility] )     prostate ca,had radiation   • Cardiac disease     stents,then triple bypass   • COPD (chronic obstructive pulmonary disease) (HCC)    • ETOH abuse    • Hx of heart artery stent     2014   • Hyperlipidemia    • Hypertension    • Hypovolemic shock ([facility] ) 12/22/2019   • [facility] ([facility] )    • S/P CABG x 1     2004       Past Surgical History:   Procedure Laterality Date   • CORONARY ARTERY BYPASS GRAFT  2004   • GA ARTHRODESIS ANT INTERBODY MIN DISCECTOMY, CERVICAL BELOW C2 N/A 12/16/2020    Procedure: Anterior cervical discectomy with fusion C4-C7; Posterior cervical decompression and fusion C2-T2;  Surgeon: Avery Hoff MD;  Location: [facility];  Service: Neurosurgery   • TONSILLECTOMY         Family History   Problem Relation Age of Onset   • Diabetes Mother    • Uterine cancer Mother    • COPD Father    • Hypertension Father      I have reviewed and agree with the history as documented      E-Cigarette/Vaping   • E-Cigarette Use Never User      E-Cigarette/Vaping Substances   • Nicotine Yes    • THC No    • CBD No    • Flavoring No    • Other No    • Unknown No      Social History     Tobacco Use   • Smoking status: Current Every Day Smoker     Packs/day: 1 50     Years: 40 00     Pack years: 60 00     Types: Cigarettes   • Smokeless tobacco: Never Used   Vaping Use   • Vaping Use: Never used   Substance Use Topics   • Alcohol use: Yes     Alcohol/week: 10 0 standard drinks     Types: 10 Shots of liquor per week     Comment: last drink friday   • Drug use: No       Review of Systems   Constitutional: Negative for activity change, fatigue and fever  HENT: Negative for congestion, ear discharge and sore throat  Eyes: Negative for pain and redness  Respiratory: Negative for cough, chest tightness, shortness of breath and wheezing  Cardiovascular: Positive for chest pain  Gastrointestinal: Positive for abdominal pain  Negative for diarrhea, nausea and vomiting  Endocrine: Negative for cold intolerance  Genitourinary: Negative for dysuria and urgency  Musculoskeletal: Negative for arthralgias and back pain  Neurological: Negative for dizziness, weakness and headaches  Psychiatric/Behavioral: Negative for agitation and behavioral problems  Physical Exam  Physical Exam  Vitals and nursing note reviewed  Constitutional:       Appearance: He is well-developed  HENT:      Head: Normocephalic and atraumatic  Nose: Nose normal    Eyes:      Conjunctiva/sclera: Conjunctivae normal    Cardiovascular:      Rate and Rhythm: Normal rate and regular rhythm  Heart sounds: Normal heart sounds  Pulmonary:      Effort: Pulmonary effort is normal       Breath sounds: Normal breath sounds  Comments: Lungs are clear to auscultation bilateral   No chest wall tenderness to palpation  Abdominal:      General: Bowel sounds are normal  There is no distension  Palpations: Abdomen is soft  Tenderness:  There is no abdominal tenderness  Comments: Abdomen is soft, nondistended, with bowel sounds present all 4 quadrants  No tenderness to palpation of abdomen  Musculoskeletal:         General: Normal range of motion  Cervical back: Normal range of motion and neck supple  Skin:     General: Skin is warm  Neurological:      General: No focal deficit present  Mental Status: He is alert and oriented to person, place, and time  Psychiatric:         Mood and Affect: Mood normal          Behavior: Behavior normal          Thought Content:  Thought content normal          Judgment: Judgment normal          Vital Signs  ED Triage Vitals [10/18/22 0837]   Temperature Pulse Respirations Blood Pressure SpO2   97 9 °F (36 6 °C) 85 18 (!) 191/106 95 %      Temp Source Heart Rate Source Patient Position - Orthostatic VS BP Location FiO2 (%)   Tympanic Monitor Sitting Right arm --      Pain Score       --           Vitals:    10/18/22 1100 10/18/22 1130 10/18/22 1200 10/18/22 1300   BP: (!) 185/95 (!) 195/103 (!) 174/79 (!) 174/85   Pulse: 72 72 68 67   Patient Position - Orthostatic VS:             Visual Acuity      ED Medications  Medications   metoprolol (LOPRESSOR) injection 5 mg (5 mg Intravenous Given 10/18/22 1020)   Famotidine (PF) (PEPCID) injection 40 mg (40 mg Intravenous Given 10/18/22 1113)   pantoprazole (PROTONIX) injection 40 mg (40 mg Intravenous Given 10/18/22 1112)   magnesium sulfate 2 g/50 mL IVPB (premix) 2 g (0 g Intravenous Stopped 10/18/22 1240)   sodium chloride 0 9 % bolus 1,000 mL (0 mL Intravenous Stopped 10/18/22 1240)   lisinopril (ZESTRIL) tablet 5 mg (5 mg Oral Given 10/18/22 1343)       Diagnostic Studies  Results Reviewed     Procedure Component Value Units Date/Time    HS Troponin I 4hr [[id number]]  (Abnormal) Collected: 10/18/22 1241    Lab Status: Final result Specimen: Blood from Arm, Left Updated: 10/18/22 1309     hs TnI 4hr 50 ng/L      Delta 4hr hsTnI 1 ng/L     Basic metabolic panel [[id number]]  (Abnormal) Collected: 10/18/22 1241    Lab Status: Final result Specimen: Blood from Arm, Left Updated: 10/18/22 1259     Sodium 132 mmol/L      Potassium 4 0 mmol/L      Chloride 102 mmol/L      CO2 26 mmol/L      ANION GAP 4 mmol/L      BUN 15 mg/dL      Creatinine 1 09 mg/dL      Glucose 104 mg/dL      Calcium 8 7 mg/dL      eGFR 73 ml/min/1 73sq m     Narrative:      [place] guidelines for Chronic Kidney Disease (CKD):   •  Stage 1 with normal or high GFR (GFR > 90 mL/min/1 73 square meters)  •  Stage 2 Mild CKD (GFR = 60-89 mL/min/1 73 square meters)  •  Stage 3A Moderate CKD (GFR = 45-59 mL/min/1 73 square meters)  •  Stage 3B Moderate CKD (GFR = 30-44 mL/min/1 73 square meters)  •  Stage 4 Severe CKD (GFR = 15-29 mL/min/1 73 square meters)  •  Stage 5 End Stage CKD (GFR <15 mL/min/1 73 square meters)  Note: GFR calculation is accurate only with a steady state creatinine    HS Troponin I 2hr [[id number]]  (Normal) Collected: 10/18/22 1105    Lab Status: Final result Specimen: Blood from Arm, Left Updated: 10/18/22 1153     hs TnI 2hr 49 ng/L      Delta 2hr hsTnI 0 ng/L     Rapid drug screen, urine [[id number]]  (Abnormal) Collected: 10/18/22 1009    Lab Status: Final result Specimen: Urine, Clean Catch Updated: 10/18/22 1038     Amph/Meth UR Negative     Barbiturate Ur Negative     Benzodiazepine Urine Positive     Cocaine Urine Negative     Methadone Urine Negative     Opiate Urine Negative     PCP Ur Negative     THC Urine Negative     Oxycodone Urine Negative    Narrative:      Presumptive report  If requested, specimen will be sent to reference lab for confirmation  FOR MEDICAL PURPOSES ONLY  IF CONFIRMATION NEEDED PLEASE CONTACT THE LAB WITHIN 5 DAYS      Drug Screen Cutoff Levels:  AMPHETAMINE/METHAMPHETAMINES  1000 ng/mL  BARBITURATES     200 ng/mL  BENZODIAZEPINES     200 ng/mL  COCAINE      300 ng/mL  METHADONE      300 ng/mL  OPIATES      300 ng/mL  PHENCYCLIDINE     25 ng/mL  THC       50 ng/mL  OXYCODONE      100 ng/mL    UA w Reflex to Microscopic w Reflex to Culture [[id number]] Collected: 10/18/22 1009    Lab Status: Final result Specimen: Urine, Clean Catch Updated: 10/18/22 1025     Color, UA Yellow     Clarity, UA Clear     Specific Gravity, UA <=1 005     pH, UA 6 5     Leukocytes, UA Negative     Nitrite, UA Negative     Protein, UA Negative mg/dl      Glucose, UA Negative mg/dl      Ketones, UA Negative mg/dl      Urobilinogen, UA 0 2 E U /dl      Bilirubin, UA Negative     Occult Blood, UA Negative    HS Troponin 0hr (reflex protocol) [[id number]]  (Normal) Collected: 10/18/22 0913    Lab Status: Final result Specimen: Blood from Arm, Right Updated: 10/18/22 0951     hs TnI 0hr 49 ng/L     Comprehensive metabolic panel [[id number]]  (Abnormal) Collected: 10/18/22 0913    Lab Status: Final result Specimen: Blood from Arm, Right Updated: 10/18/22 0942     Sodium 129 mmol/L      Potassium 4 6 mmol/L      Chloride 98 mmol/L      CO2 25 mmol/L      ANION GAP 6 mmol/L      BUN 17 mg/dL      Creatinine 1 28 mg/dL      Glucose 116 mg/dL      Calcium 9 2 mg/dL      AST 37 U/L      ALT 76 U/L      Alkaline Phosphatase 64 U/L      Total Protein 7 6 g/dL      Albumin 3 5 g/dL      Total Bilirubin 0 31 mg/dL      eGFR 60 ml/min/1 73sq m     Narrative:      [place] guidelines for Chronic Kidney Disease (CKD):   •  Stage 1 with normal or high GFR (GFR > 90 mL/min/1 73 square meters)  •  Stage 2 Mild CKD (GFR = 60-89 mL/min/1 73 square meters)  •  Stage 3A Moderate CKD (GFR = 45-59 mL/min/1 73 square meters)  •  Stage 3B Moderate CKD (GFR = 30-44 mL/min/1 73 square meters)  •  Stage 4 Severe CKD (GFR = 15-29 mL/min/1 73 square meters)  •  Stage 5 End Stage CKD (GFR <15 mL/min/1 73 square meters)  Note: GFR calculation is accurate only with a steady state creatinine    Magnesium [[id number]]  (Abnormal) Collected: 10/18/22 0913    Lab Status: Final result Specimen: Blood from Arm, Right Updated: 10/18/22 0942     Magnesium 1 5 mg/dL     Ethanol [[id number]]  (Normal) Collected: 10/18/22 0913    Lab Status: Final result Specimen: Blood from Arm, Right Updated: 10/18/22 0942     Ethanol Lvl <3 mg/dL     Lipase [[id number]]  (Abnormal) Collected: 10/18/22 0913    Lab Status: Final result Specimen: Blood from Arm, Right Updated: 10/18/22 0942     Lipase 438 u/L     CBC and differential [[id number]]  (Abnormal) Collected: 10/18/22 0913    Lab Status: Final result Specimen: Blood from Arm, Right Updated: 10/18/22 0926     WBC 6 44 Thousand/uL      RBC 3 71 Million/uL      Hemoglobin 12 4 g/dL      Hematocrit 36 8 %      MCV 99 fL      MCH 33 4 pg      MCHC 33 7 g/dL      RDW 14 5 %      MPV 10 4 fL      Platelets 156 Thousands/uL      nRBC 0 /100 WBCs      Neutrophils Relative 56 %      Immat GRANS % 0 %      Lymphocytes Relative 19 %      Monocytes Relative 22 %      Eosinophils Relative 2 %      Basophils Relative 1 %      Neutrophils Absolute 3 57 Thousands/µL      Immature Grans Absolute 0 02 Thousand/uL      Lymphocytes Absolute 1 25 Thousands/µL      Monocytes Absolute 1 42 Thousand/µL      Eosinophils Absolute 0 11 Thousand/µL      Basophils Absolute 0 07 Thousands/µL                  No orders to display              Procedures  ECG 12 Lead Documentation Only    Date/Time: 10/18/2022 8:40 PM  Performed by: Linda Aguayo DO  Authorized by: Linda Aguayo DO     Indications / Diagnosis:  Pain  ECG reviewed by me, the ED Provider: yes    Patient location:  ED  Previous ECG:     Previous ECG:  Compared to current    Similarity:  No change    Comparison to cardiac monitor: Yes    Interpretation:     Interpretation: non-specific    Comments:      Sinus rhythm, rate 81, normal axis, normal intervals, no acute ST/T-wave abnormalities under, brought P wave noted suggesting possible left atrial enlargement that is unchanged from baseline  ED Course  ED Course as of 10/18/22 1711   Tue Oct 18, 2022   [id number] Patient remains chest pain-free in the emergency department  At this time patient's symptoms may be secondary to gastritis and reflux  The patient is 1st 2 troponin did not change value with a delta of 0  After 4 hours patient has a 4 hour delta of only 1  At this time patient will be discharged home on Pepcid and follow up to GI as well as Cardiology                                              MDM  Number of Diagnoses or Management Options  Chest pain, unspecified: new and requires workup  GERD (gastroesophageal reflux disease): new and requires workup  Diagnosis management comments: Patient blood work, EKG  Give IV fluids, Pepcid and continue to monitor patient for any worsening symptoms       Amount and/or Complexity of Data Reviewed  Clinical lab tests: ordered and reviewed  Tests in the medicine section of CPT®: reviewed and ordered  Review and summarize past medical records: yes  Independent visualization of images, tracings, or specimens: yes    Risk of Complications, Morbidity, and/or Mortality  General comments: Patient did not have any chest pain or abdominal pain episode in the emergency department  Patient has history of baseline hypo magnesemia as well as hyponatremia secondary to his drinking  Patient does have p o  Magnesium at home  Patient's sodium was low at 129 in the ED  IV fluid repletion given for magnesium as well as sodium  Sodium level improved after IV fluid hydration  Patient did not have any chest pain throughout his ED stay  EKG was at baseline  Patient's troponin was initially elevated however daughter was zero  For our troponin had a delta 1  At this time patient's symptoms may be a component of change nonspecific chest pain versus exacerbation of GERD  Patient is discharged home with addition of Pepcid to his GERD regimen and follow up to GI as well as Cardiology      Close return instructions given to return to the ER for any worsening symptoms  Patient agrees with discharge plan  Patient well appearing at time of discharge  Please Note: Fluency Direct voice recognition software may have been used in the creation of this document  Wrong words or sound a like substitutions may have occurred due to the inherent limitations of the voice software  Patient Progress  Patient progress: resolved      Disposition  Final diagnoses:   Chest pain, unspecified   GERD (gastroesophageal reflux disease)     Time reflects when diagnosis was documented in both MDM as applicable and the Disposition within this note     Time User Action Codes Description Comment    10/18/2022  1:31 PM Chico Tadeo Add [R07 9] Chest pain, unspecified     10/18/2022  1:32 PM Emma Erwin Add [K21 9] GERD (gastroesophageal reflux disease)       ED Disposition     ED Disposition   Discharge    Condition   Stable    Date/Time   Tue Oct 18, 2022  1:31 PM    Comment   Radha King discharge to home/self care                 Follow-up Information     Follow up With Specialties Details Why Contact Info    Sunday Hernandez MD Cardiology Schedule an appointment as soon as possible for a visit   [address] [place] [address]  [phone]      Your GI doctor  Schedule an appointment as soon as possible for a visit             Discharge Medication List as of 10/18/2022  1:40 PM      START taking these medications    Details   famotidine (PEPCID) 20 mg tablet Take 1 tablet (20 mg total) by mouth 2 (two) times a day, Starting Tue 10/18/2022, Normal         CONTINUE these medications which have NOT CHANGED    Details   albuterol (Ventolin HFA) 90 mcg/act inhaler Inhale 2 puffs every 4 (four) hours as needed for wheezing, Starting Tue 6/7/2022, Normal      Blood Glucose Monitoring Suppl (ONE TOUCH ULTRA MINI) w/Device KIT Use as directed, Starting Thu 5/16/2019, Historical Med      Breo Ellipta 200-25 MCG/INH inhaler INHALE 1 PUFF DAILY RINSE MOUTH AFTER USE , Starting Mon 10/17/2022, Normal      ferrous sulfate 324 (65 Fe) mg Take 1 tablet (324 mg total) by mouth 2 (two) times a day before meals, Starting Thu 9/5/3105, Normal      folic acid (FOLVITE) 1 mg tablet Take 1 tablet (1,000 mcg total) by mouth daily, Starting Thu 6/9/2022, Normal      gabapentin (NEURONTIN) 300 mg capsule TAKE ONE CAPSULE THREE TIMES DAILY, Normal      lisinopril (ZESTRIL) 10 mg tablet Take 0 5 tablets (5 mg total) by mouth daily, Starting Mon 6/27/2022, Normal      magnesium Oxide (MAG-OX) 400 mg TABS Take 1 tablet (400 mg total) by mouth 2 (two) times a day, Starting Thu 6/9/2022, Normal      metFORMIN (GLUCOPHAGE) 1000 MG tablet Take 1 tablet (1,000 mg total) by mouth daily with breakfast, Starting Mon 10/17/2022, Normal      metoprolol tartrate (LOPRESSOR) 25 mg tablet Take 0 5 tablets (12 5 mg total) by mouth every 12 (twelve) hours, Starting Mon 6/27/2022, Normal      omeprazole (PriLOSEC) 20 mg delayed release capsule Take 1 capsule (20 mg total) by mouth daily, Starting Thu 6/9/2022, Normal      ONETOUCH DELICA LANCETS FINE MISC 3 (three) times a day Test, Starting Thu 5/16/2019, Historical Med      pravastatin (PRAVACHOL) 40 mg tablet Take 1 tablet (40 mg total) by mouth daily with dinner, Starting Thu 6/9/2022, Normal      ranolazine (RANEXA) 500 mg 12 hr tablet Take 1 tablet (500 mg total) by mouth every 12 (twelve) hours, Starting Mon 6/27/2022, Normal      tamsulosin (FLOMAX) 0 4 mg Take 1 capsule (0 4 mg total) by mouth daily, Starting Mon 6/27/2022, Normal      thiamine 100 MG tablet Take 1 tablet (100 mg total) by mouth daily, Starting Tue 6/7/2022, Normal             No discharge procedures on file      PDMP Review       Value Time User    PDMP Reviewed  Yes 7/26/2021  4:50 PM Alfredo Velazquez MD          ED Provider  Electronically Signed by           Majo Molina DO  10/18/22 4611

## 2022-10-18 NOTE — PROGRESS NOTES
Pt due for outreach today  Pt went to ED for evaluation of chest pain  Pt seen at [facility] for Medicare Annual Wellness visit on 10/18  RN DESIREE will follow up with patient after discharge from ED

## 2022-10-19 ENCOUNTER — PATIENT OUTREACH (OUTPATIENT)
Dept: FAMILY MEDICINE CLINIC | Facility: CLINIC | Age: 62
End: 2022-10-19

## 2022-10-19 NOTE — PROGRESS NOTES
AARTI had received a referral from Maximiliano Zhou MD r/t alcohol abuse and transportation issues  SWCM had completed a chart review  Per chart, patient was sober for about a week when in the office on 10/17  Per chart, patient scored a 13 on his depression screening  Per chart, patient does not feel depressed or have feeling of anhedonia  Per chart, patient plans to f/u with [facility] [phone]  Per chart, CHRISTIE Crisostomo on care team for this patient  AARTI had called the patient via phone  AARTI left a voicemail for this patient  AARTI will attempt to call the patient again at a later date and time  MERT will continue to be available

## 2022-10-21 ENCOUNTER — TELEPHONE (OUTPATIENT)
Dept: FAMILY MEDICINE CLINIC | Facility: CLINIC | Age: 62
End: 2022-10-21

## 2022-10-21 NOTE — TELEPHONE ENCOUNTER
Pt in office and stated he needs referrals to     Dental  Cardiology   [facility]    Please call when ready to be picked up [phone]

## 2022-10-24 DIAGNOSIS — I25.10 CORONARY ARTERY DISEASE, UNSPECIFIED VESSEL OR LESION TYPE, UNSPECIFIED WHETHER ANGINA PRESENT, UNSPECIFIED WHETHER NATIVE OR TRANSPLANTED HEART: Primary | ICD-10-CM

## 2022-10-24 DIAGNOSIS — I50.32 CHRONIC HEART FAILURE WITH PRESERVED EJECTION FRACTION (HCC): ICD-10-CM

## 2022-10-24 DIAGNOSIS — I48.20 CHRONIC ATRIAL FIBRILLATION (HCC): ICD-10-CM

## 2022-10-25 ENCOUNTER — PATIENT OUTREACH (OUTPATIENT)
Dept: FAMILY MEDICINE CLINIC | Facility: CLINIC | Age: 62
End: 2022-10-25
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
## 2022-10-26 ENCOUNTER — APPOINTMENT (OUTPATIENT)
Dept: LAB | Facility: HOSPITAL | Age: 62
End: 2022-10-26

## 2022-10-26 ENCOUNTER — HOSPITAL ENCOUNTER (OUTPATIENT)
Dept: RADIOLOGY | Facility: HOSPITAL | Age: 62
Discharge: HOME | End: 2022-10-26
Payer: MEDICARE

## 2022-10-26 DIAGNOSIS — K70.0 FATTY LIVER, ALCOHOLIC: ICD-10-CM

## 2022-10-26 DIAGNOSIS — D69.6 THROMBOCYTOPENIA (HCC): ICD-10-CM

## 2022-10-26 DIAGNOSIS — Z11.59 ENCOUNTER FOR SCREENING FOR OTHER VIRAL DISEASES: ICD-10-CM

## 2022-10-26 LAB
ALBUMIN SERPL BCP-MCNC: 3.4 G/DL (ref 3.5–5)
ALP SERPL-CCNC: 59 U/L (ref 46–116)
ALT SERPL W P-5'-P-CCNC: 30 U/L (ref 12–78)
ANION GAP SERPL CALCULATED.3IONS-SCNC: 8 MMOL/L (ref 4–13)
AST SERPL W P-5'-P-CCNC: 25 U/L (ref 5–45)
BASOPHILS # BLD AUTO: 0.18 THOUSANDS/ÂΜL (ref 0–0.1)
BASOPHILS NFR BLD AUTO: 3 % (ref 0–1)
BILIRUB SERPL-MCNC: 0.29 MG/DL (ref 0.2–1)
BUN SERPL-MCNC: 19 MG/DL (ref 5–25)
CALCIUM ALBUM COR SERPL-MCNC: 9.7 MG/DL (ref 8.3–10.1)
CALCIUM SERPL-MCNC: 9.2 MG/DL (ref 8.3–10.1)
CHLORIDE SERPL-SCNC: 102 MMOL/L (ref 96–108)
CO2 SERPL-SCNC: 27 MMOL/L (ref 21–32)
CREAT SERPL-MCNC: 1.26 MG/DL (ref 0.6–1.3)
EOSINOPHIL # BLD AUTO: 0.16 THOUSAND/ÂΜL (ref 0–0.61)
EOSINOPHIL NFR BLD AUTO: 2 % (ref 0–6)
ERYTHROCYTE [DISTWIDTH] IN BLOOD BY AUTOMATED COUNT: 13.9 % (ref 11.6–15.1)
FERRITIN SERPL-MCNC: 117 NG/ML (ref 8–388)
GFR SERPL CREATININE-BSD FRML MDRD: 61 ML/MIN/1.73SQ M
GLUCOSE P FAST SERPL-MCNC: 109 MG/DL (ref 65–99)
HBV CORE AB SER QL: ABNORMAL
HBV CORE IGM SER QL: ABNORMAL
HBV SURFACE AG SER QL: ABNORMAL
HCT VFR BLD AUTO: 38.4 % (ref 36.5–49.3)
HCV AB SER QL: ABNORMAL
HGB BLD-MCNC: 12.7 G/DL (ref 12–17)
IMM GRANULOCYTES # BLD AUTO: 0.04 THOUSAND/UL (ref 0–0.2)
IMM GRANULOCYTES NFR BLD AUTO: 1 % (ref 0–2)
INR PPP: 0.92 (ref 0.84–1.19)
IRON SATN MFR SERPL: 16 % (ref 20–50)
IRON SERPL-MCNC: 44 UG/DL (ref 65–175)
LYMPHOCYTES # BLD AUTO: 1.26 THOUSANDS/ÂΜL (ref 0.6–4.47)
LYMPHOCYTES NFR BLD AUTO: 18 % (ref 14–44)
MCH RBC QN AUTO: 32.8 PG (ref 26.8–34.3)
MCHC RBC AUTO-ENTMCNC: 33.1 G/DL (ref 31.4–37.4)
MCV RBC AUTO: 99 FL (ref 82–98)
MONOCYTES # BLD AUTO: 0.96 THOUSAND/ÂΜL (ref 0.17–1.22)
MONOCYTES NFR BLD AUTO: 14 % (ref 4–12)
NEUTROPHILS # BLD AUTO: 4.47 THOUSANDS/ÂΜL (ref 1.85–7.62)
NEUTS SEG NFR BLD AUTO: 62 % (ref 43–75)
NRBC BLD AUTO-RTO: 0 /100 WBCS
PLATELET # BLD AUTO: 268 THOUSANDS/UL (ref 149–390)
PMV BLD AUTO: 10.3 FL (ref 8.9–12.7)
POTASSIUM SERPL-SCNC: 4.7 MMOL/L (ref 3.5–5.3)
PROT SERPL-MCNC: 7.7 G/DL (ref 6.4–8.4)
PROTHROMBIN TIME: 12.5 SECONDS (ref 11.6–14.5)
RBC # BLD AUTO: 3.87 MILLION/UL (ref 3.88–5.62)
SODIUM SERPL-SCNC: 137 MMOL/L (ref 135–147)
TIBC SERPL-MCNC: 270 UG/DL (ref 250–450)
WBC # BLD AUTO: 7.07 THOUSAND/UL (ref 4.31–10.16)

## 2022-10-26 PROCEDURE — 76700 US EXAM ABDOM COMPLETE: CPT

## 2022-10-27 ENCOUNTER — PATIENT OUTREACH (OUTPATIENT)
Dept: FAMILY MEDICINE CLINIC | Facility: CLINIC | Age: 62
End: 2022-10-27

## 2022-10-27 LAB
A1AT SERPL-MCNC: 172 MG/DL (ref 101–187)
ACTIN IGG SERPL-ACNC: 20 UNITS (ref 0–19)
CERULOPLASMIN SERPL-MCNC: 25.5 MG/DL (ref 16–31)
MITOCHONDRIA M2 IGG SER-ACNC: <20 UNITS (ref 0–20)
RYE IGE QN: NEGATIVE
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
## 2022-10-31 DIAGNOSIS — Z85.46 HX OF PROSTATIC MALIGNANCY: ICD-10-CM

## 2022-10-31 RX ORDER — TAMSULOSIN HYDROCHLORIDE 0.4 MG/1
CAPSULE ORAL
Qty: 90 CAPSULE | Refills: 1 | Status: SHIPPED | OUTPATIENT
Start: 2022-10-31

## 2022-11-01 ENCOUNTER — TELEPHONE (OUTPATIENT)
Dept: GASTROENTEROLOGY | Facility: CLINIC | Age: 62
End: 2022-11-01

## 2022-11-01 NOTE — TELEPHONE ENCOUNTER
----- Message from Brannon Cline PA-C sent at 10/31/2022  4:22 PM EDT -----  Dysphagia for EGD    Colonoscopy is for screening purposes

## 2022-11-01 NOTE — TELEPHONE ENCOUNTER
Pt called back, he is having eye surgery   Once he has the dates for that he will call back to schedule his EGD/Colonscopy

## 2022-11-01 NOTE — TELEPHONE ENCOUNTER
I lmom for pt to please call back to schedule procedures  Will call pt again in one week if do not hear back from him

## 2022-11-15 ENCOUNTER — PATIENT OUTREACH (OUTPATIENT)
Dept: FAMILY MEDICINE CLINIC | Facility: CLINIC | Age: 62
End: 2022-11-15

## 2022-11-15 NOTE — PROGRESS NOTES
Returned patients call  Pt needs assistance with scheduling an appointment for medical clearance so he can have his eye surgery  Assisted patient with scheduling appointment for 11/17  Encouraged patient to bring all paperwork pertaining to medical clearance  Pt verbalizes understanding

## 2022-11-17 ENCOUNTER — OFFICE VISIT (OUTPATIENT)
Dept: FAMILY MEDICINE CLINIC | Facility: CLINIC | Age: 62
End: 2022-11-17

## 2022-11-17 VITALS
BODY MASS INDEX: 25.81 KG/M2 | SYSTOLIC BLOOD PRESSURE: 172 MMHG | WEIGHT: 201.13 LBS | TEMPERATURE: 97.8 F | DIASTOLIC BLOOD PRESSURE: 80 MMHG | HEIGHT: 74 IN | OXYGEN SATURATION: 97 % | HEART RATE: 115 BPM | RESPIRATION RATE: 20 BRPM

## 2022-11-17 DIAGNOSIS — Z78.9 POOR HISTORIAN: ICD-10-CM

## 2022-11-17 DIAGNOSIS — E11.00 TYPE 2 DIABETES MELLITUS WITH HYPEROSMOLARITY WITHOUT COMA, WITHOUT LONG-TERM CURRENT USE OF INSULIN (HCC): ICD-10-CM

## 2022-11-17 DIAGNOSIS — J44.9 CHRONIC OBSTRUCTIVE PULMONARY DISEASE, UNSPECIFIED COPD TYPE (HCC): Chronic | ICD-10-CM

## 2022-11-17 DIAGNOSIS — E78.5 DYSLIPIDEMIA: ICD-10-CM

## 2022-11-17 DIAGNOSIS — I50.32 CHRONIC HEART FAILURE WITH PRESERVED EJECTION FRACTION (HCC): ICD-10-CM

## 2022-11-17 DIAGNOSIS — F33.9 DEPRESSION, RECURRENT (HCC): ICD-10-CM

## 2022-11-17 DIAGNOSIS — I25.10 CORONARY ARTERY DISEASE, UNSPECIFIED VESSEL OR LESION TYPE, UNSPECIFIED WHETHER ANGINA PRESENT, UNSPECIFIED WHETHER NATIVE OR TRANSPLANTED HEART: Chronic | ICD-10-CM

## 2022-11-17 DIAGNOSIS — F10.10 ALCOHOL ABUSE: Chronic | ICD-10-CM

## 2022-11-17 DIAGNOSIS — E44.1 MILD PROTEIN-CALORIE MALNUTRITION (HCC): ICD-10-CM

## 2022-11-17 DIAGNOSIS — N18.31 STAGE 3A CHRONIC KIDNEY DISEASE (HCC): ICD-10-CM

## 2022-11-17 DIAGNOSIS — Z86.79 HISTORY OF ATRIAL FIBRILLATION: ICD-10-CM

## 2022-11-17 DIAGNOSIS — C61 PROSTATE CANCER (HCC): ICD-10-CM

## 2022-11-17 DIAGNOSIS — Z72.0 NICOTINE ABUSE: Chronic | ICD-10-CM

## 2022-11-17 DIAGNOSIS — I25.119 CORONARY ARTERY DISEASE WITH ANGINA PECTORIS, UNSPECIFIED VESSEL OR LESION TYPE, UNSPECIFIED WHETHER NATIVE OR TRANSPLANTED HEART (HCC): ICD-10-CM

## 2022-11-17 DIAGNOSIS — Z01.818 PREOPERATIVE CLEARANCE: Primary | ICD-10-CM

## 2022-11-17 DIAGNOSIS — E61.1 IRON DEFICIENCY: ICD-10-CM

## 2022-11-17 PROBLEM — M46.96 LUMBAR SPONDYLITIS (HCC): Status: RESOLVED | Noted: 2022-10-13 | Resolved: 2022-11-17

## 2022-11-17 PROBLEM — F10.939 ALCOHOL WITHDRAWAL (HCC): Status: RESOLVED | Noted: 2019-06-07 | Resolved: 2022-11-17

## 2022-11-17 PROBLEM — S02.2XXA NASAL BONE FRACTURE: Status: RESOLVED | Noted: 2022-10-10 | Resolved: 2022-11-17

## 2022-11-17 PROBLEM — I20.8 STABLE ANGINA (HCC): Status: ACTIVE | Noted: 2022-11-17

## 2022-11-17 PROBLEM — I20.89 STABLE ANGINA: Status: ACTIVE | Noted: 2022-11-17

## 2022-11-17 RX ORDER — PREDNISOLONE ACETATE 10 MG/ML
SUSPENSION/ DROPS OPHTHALMIC
Status: ON HOLD | COMMUNITY
Start: 2022-11-15 | End: 2023-05-11

## 2022-11-17 NOTE — PROGRESS NOTES
Subjective:  Hari Collier is a 63 y o  male who presents to the office today for a preoperative consultation at the request of surgeon Dr Jorge A Coronado who plans on performing bilateral cataract surgery on November 28 and Dec  12th  This consultation is requested for the specific conditions prompting preoperative evaluation (i e  because of potential affect on operative risk): see problem list  Planned anesthesia: local, maybe IV sedation  The patient has the following known anesthesia issues: no personal or FH issue with anesthesia  Patients bleeding risk: no recent abnormal bleeding  Patient does not have objections to receiving blood products if needed  GI doctor is f/u Dec 21st to review labs, [address]  Was recently in the hospital with a broken nose than 2 black eyes and some workup was done at that time    An EKG from October 18, 2022 shows sinus rhythm possible left atrial enlargement that is unchanged from baseline no acute ST or T-wave abnormalities    Surgical manager Felice Sierra, [address]  at [facility] fax  in [address] [place] [address] telephone [phone]  extension [phone]    The following portions of the patient's history were reviewed and updated as appropriate: allergies, current medications, past family history, past medical history, past social history, past surgical history and problem list     Was detoxed from alcohol during  recent hospital admission and states has not been drinking since except for 2 beers on Tuesday    Is taking his magnesium now to correct hypomagnesemia    Has appt with cardiiology in Dec      Recent Chem screen done in October shows mild fasting glucose and very slight decrease in albumin % iron saturation is low at 16 however is not anemic [facility] is elevated consistent with alcohol use, he is low positive for hepatitis-C antibody and smooth muscle antibody is mildly elevated urinalysis is benign a urine drug screen done in October was positive for benzodiazepines    Review of Systems  Constitutional: No fever, chills or sweats  HEENT: No eye or ear symptoms, sore throat, congested nose  Cardiorespiratory: Positive chest pressure most days, worse with exertion  shortness of breath  Abdomen/Gastrointestinal: No abdominal pain or unusual change digestion or in bowel movements  Genitourinary: No change in urination  Neuromuscular: No new neurological symptoms or unexplained/new achy joints or muscles  Psych: No suicidal ideation      Objective:  Alert, No acute distress    BP (!) 172/80 (BP Location: Left arm, Patient Position: Sitting, Cuff Size: Large)   Pulse (!) 115   Temp 97 8 °F (36 6 °C) (Tympanic)   Resp 20   Ht 6' 2" (1 88 m)   Wt 91 2 kg (201 lb 2 oz)   SpO2 97%   BMI 25 82 kg/m²   Bilateral cataracts noted  Head, ears, eyes, nose, throat, neck:  Head atraumatic, normocephalic  Conjunctiva clear, pupils equal and reactive to light  Throat: within normal limits  Tympanic membranes clear  Neck supple without concerning nodes, masses or thyromegaly    Respiratory: No respiratory distress  Scattered mild rhonchi and wheezes noted throughout but reasonably good air exchange  Cardiac: Heart regular rate and rhythm, normal S1 and S2, no murmur  Mild S4 noted  Abdomen protuberant Soft and non-tender  Extremities: no cyanosis, clubbing or edema  Toenails dystrophic  NO ulcers  Cardiographics  ECG: Pending cardiac evaluation      Imaging  Chest x-ray:  From April 2022 no acute cardiopulmonary disease     Lab Review    not applicable  Electrolytes stable; positive for signs of iron deficiency or chronic disease; hemoglobin 12 7 from October  Other labs ordered and pending     Assessment/Plan:  Extensive cardiopulmonary disease history  Patient likely has chronic stable angina  He is to see cardiologist's for formal cardiology clearance in addition to my clearance    Planned surgery as above if pending labs and cardiology clearance okay  Known risk factors for perioperative complications: Alcohol overuse  Coronary disease  Chronic pulmonary disease  Diabetes mellitus  Hepatic dysfunction    Difficulty with intubation is not anticipated  Cardiac Risk Estimation: 2 points on Revised  Cardiac Risk Index, Class 3 risk,  10% 30 day risk of death, MI or cardiac arrest    Current medications which may produce withdrawal symptoms if withheld perioperatively: metoprolol should continue           1  Preoperative workup as follows consider cardiac stress testing to evaluate known coronary disease   2  Change in medication regimen before surgery: none, continue medication regimen including morning of surgery, with sip of water  3  Prophylaxis for cardiac events with perioperative beta-blockers: per cardiologist  4  Invasive hemodynamic monitoring perioperatively: at the discretion of anesthesiologist   5  Deep vein thrombosis prophylaxis postoperatively:regimen to be chosen by surgical team   6 Other measures: Preoperative alcohol abstention x 30 days  Tobacco Cessation Counseling: Tobacco cessation counseling was provided   The patient is sincerely urged to quit consumption of tobacco  He is not ready to quit tobacco

## 2022-11-22 ENCOUNTER — OFFICE VISIT (OUTPATIENT)
Dept: LAB | Facility: HOSPITAL | Age: 62
End: 2022-11-22

## 2022-11-22 ENCOUNTER — APPOINTMENT (OUTPATIENT)
Dept: LAB | Facility: HOSPITAL | Age: 62
End: 2022-11-22

## 2022-11-22 DIAGNOSIS — F10.10 ALCOHOL ABUSE: Chronic | ICD-10-CM

## 2022-11-22 DIAGNOSIS — Z01.818 ENCOUNTER FOR OTHER PREPROCEDURAL EXAMINATION: ICD-10-CM

## 2022-11-22 LAB
ALBUMIN SERPL BCP-MCNC: 3.5 G/DL (ref 3.5–5)
ALP SERPL-CCNC: 66 U/L (ref 46–116)
ALT SERPL W P-5'-P-CCNC: 36 U/L (ref 12–78)
ANION GAP SERPL CALCULATED.3IONS-SCNC: 5 MMOL/L (ref 4–13)
AST SERPL W P-5'-P-CCNC: 49 U/L (ref 5–45)
BILIRUB SERPL-MCNC: 0.53 MG/DL (ref 0.2–1)
BUN SERPL-MCNC: 22 MG/DL (ref 5–25)
CALCIUM SERPL-MCNC: 9.9 MG/DL (ref 8.3–10.1)
CHLORIDE SERPL-SCNC: 102 MMOL/L (ref 96–108)
CO2 SERPL-SCNC: 27 MMOL/L (ref 21–32)
CREAT SERPL-MCNC: 1.12 MG/DL (ref 0.6–1.3)
GFR SERPL CREATININE-BSD FRML MDRD: 71 ML/MIN/1.73SQ M
GLUCOSE P FAST SERPL-MCNC: 97 MG/DL (ref 65–99)
MAGNESIUM SERPL-MCNC: 2.1 MG/DL (ref 1.6–2.6)
POTASSIUM SERPL-SCNC: 4.8 MMOL/L (ref 3.5–5.3)
PROT SERPL-MCNC: 7.6 G/DL (ref 6.4–8.4)
SODIUM SERPL-SCNC: 134 MMOL/L (ref 135–147)

## 2022-11-22 NOTE — PROGRESS NOTES
Progress Note - Cardiology Office  [facility]    Figueroa Flores 63 y o  male MRN: [id number]  : 1960  Encounter: [id number]      ASSESSMENT:   Perioperative cardiac risk assessment fo bilateral cataract surgery on 2022 and 2022, under local anesthesia and may be IV sedation    History of CAD,  History of chronic angina  S/p CABG ,  S/p PCI to left main      Does not appear to have any cardiology follow-up except for clearance for spinal cord surgery in 2020 as an inpatient    Was seen in [facility] ED on 10/18/2022 with epigastric and chest pain after a meat loaf dinner the previous night    TTE, 2020:  EF 55-60%, moderate LVH, G1DD,  Moderate LAE, mild MR and TR, mild to moderate pulmonary hypertension at 55 mmHg    Essential hypertension  Severely elevated blood pressure of 200/100 mmHg with heart rate of 89 per minute    PAF , possibly briefly post operatively    Diabetes mellitus    Diastolic dysfunction    COPD    History of alcohol abuse, nicotine abuse,  History of alcohol withdrawal    History of C4-C6 cord compression/spinal cord edema secondary to mechanical fall  S/p spinal surgery on 2020        RECOMMENDATIONS:  Advised patient to go to [place] ED to get his blood pressure controlled with meds while being monitored  Echocardiogram, possibly while in the ED,  If his LV function is adequate and if his blood pressure is controlled, he can subsequently be cleared for his cataract surgery  Alcohol and tobacco cessation    Please call [phone] if any questions  HPI :     Figueroa Flores is a 63y o  year old male who came for cardiac clearance for cataract surgery  He denies any active cardiac symptoms, however his blood pressure is dangerously elevated at 200/100 mm Hg  I advised patient to go to the ED to get his blood pressure under control with medications prior to getting cardiac clearance for surgery      REVIEW OF SYSTEMS:  Review of Systems   Respiratory: Positive for chest tightness (Chest pressure with exertion) and shortness of breath (Dyspnea with exertion)  Decreased visual acuity  All other systems are negative    Historical Information   Past Medical History:   Diagnosis Date   • Acute on chronic kidney failure (HCC)    • Alcohol withdrawal ([place] [address] ) 6/7/2019   • Cancer ([place] [address] )     prostate ca,had radiation   • Cardiac disease     stents,then triple bypass   • COPD (chronic obstructive pulmonary disease) (HCC)    • ETOH abuse    • Hx of heart artery stent     2014   • Hyperlipidemia    • Hypertension    • Hypovolemic shock ([place] [address] ) 12/22/2019   • Lumbar spondylitis ([place] [address] ) 10/13/2022   • Nasal bone fracture 10/10/2022   • Prostate CA ([place] [address] )    • S/P CABG x 1     2004     Past Surgical History:   Procedure Laterality Date   • CORONARY ARTERY BYPASS GRAFT  2004   • MT ARTHRODESIS ANT INTERBODY MIN DISCECTOMY, CERVICAL BELOW C2 N/A 12/16/2020    Procedure: Anterior cervical discectomy with fusion C4-C7;  Posterior cervical decompression and fusion C2-T2;  Surgeon: Steve Montes De Oca MD;  Location: [facility];  Service: Neurosurgery   • TONSILLECTOMY       Social History     Substance and Sexual Activity   Alcohol Use Yes   • Alcohol/week: 10 0 standard drinks   • Types: 10 Shots of liquor per week    Comment: last drink friday     Social History     Substance and Sexual Activity   Drug Use No     Social History     Tobacco Use   Smoking Status Every Day   • Packs/day: 1 50   • Years: 40 00   • Pack years: 60 00   • Types: Cigarettes   Smokeless Tobacco Never     Family History:   Family History   Problem Relation Age of Onset   • Diabetes Mother    • Uterine cancer Mother    • COPD Father    • Hypertension Father        Meds/Allergies     No Known Allergies    Current Outpatient Medications:   •  Blood Glucose Monitoring Suppl (ONE TOUCH ULTRA MINI) w/Device KIT, Use as directed, Disp: , Rfl: 0  •  Breo Ellipta 200-25 MCG/INH inhaler, INHALE 1 PUFF DAILY RINSE MOUTH AFTER USE , Disp: 60 each, Rfl: 0  •  famotidine (PEPCID) 20 mg tablet, Take 1 tablet (20 mg total) by mouth 2 (two) times a day, Disp: 30 tablet, Rfl: 0  •  ferrous sulfate 324 (65 Fe) mg, Take 1 tablet (324 mg total) by mouth 2 (two) times a day before meals, Disp: 60 tablet, Rfl: 2  •  folic acid (FOLVITE) 1 mg tablet, Take 1 tablet (1,000 mcg total) by mouth daily, Disp: 90 tablet, Rfl: 2  •  gabapentin (NEURONTIN) 300 mg capsule, TAKE ONE CAPSULE THREE TIMES DAILY, Disp: 90 capsule, Rfl: 1  •  lisinopril (ZESTRIL) 10 mg tablet, Take 0 5 tablets (5 mg total) by mouth daily, Disp: 90 tablet, Rfl: 0  •  magnesium Oxide (MAG-OX) 400 mg TABS, Take 1 tablet (400 mg total) by mouth 2 (two) times a day, Disp: 120 tablet, Rfl: 2  •  metFORMIN (GLUCOPHAGE) 1000 MG tablet, Take 1 tablet (1,000 mg total) by mouth daily with breakfast, Disp: 180 tablet, Rfl: 0  •  metoprolol tartrate (LOPRESSOR) 25 mg tablet, Take 0 5 tablets (12 5 mg total) by mouth every 12 (twelve) hours, Disp: 180 tablet, Rfl: 2  •  omeprazole (PriLOSEC) 20 mg delayed release capsule, Take 1 capsule (20 mg total) by mouth daily, Disp: 90 capsule, Rfl: 3  •  ONETOUCH DELICA LANCETS FINE MISC, 3 (three) times a day Test, Disp: , Rfl: 0  •  pravastatin (PRAVACHOL) 40 mg tablet, Take 1 tablet (40 mg total) by mouth daily with dinner, Disp: 90 tablet, Rfl: 2  •  prednisoLONE acetate (PRED FORTE) 1 % ophthalmic suspension, , Disp: , Rfl:   •  ranolazine (RANEXA) 500 mg 12 hr tablet, Take 1 tablet (500 mg total) by mouth every 12 (twelve) hours, Disp: 60 tablet, Rfl: 3  •  tamsulosin (FLOMAX) 0 4 mg, TAKE 1 CAPSULE DAILY, Disp: 90 capsule, Rfl: 1  •  thiamine 100 MG tablet, Take 1 tablet (100 mg total) by mouth daily, Disp: 90 tablet, Rfl: 0  •  albuterol (Ventolin HFA) 90 mcg/act inhaler, Inhale 2 puffs every 4 (four) hours as needed for wheezing (Patient not taking: Reported on 11/23/2022), Disp: 18 g, Rfl: 0    Vitals: Blood pressure (!) 200/100, pulse 89, temperature 99 3 °F (37 4 °C), height 6' 2" (1 88 m), weight 91 2 kg (201 lb), SpO2 97 %  ?  Body mass index is 25 81 kg/m²  Vitals:    22 0933   Weight: 91 2 kg (201 lb)     BP Readings from Last 3 Encounters:   22 (!) 200/100   22 (!) 172/80   10/18/22 (!) 174/85       Physical Exam:    Neurologic:  Alert & oriented , no new focal deficits, Not in any acute distress,  Constitutional:  Well developed, well nourished,  Eyes:  Decreased visual acuity  HENT:  Deep midline surgical scar on back of neck  Respiratory:  Decreased air entry with scattered expiratory rhonchi  Cardiovascular: S1-S2 regular with a I/VI systolic murmur   GI:  Soft, nondistended, normal bowel sounds, nontender, no hepatosplenomegaly appreciated    Musculoskeletal:  No tenderness  Skin:  Well hydrated, no rash   Lymphatic:  No lymphadenopathy noted   Extremities:  No edema         Diagnostic Studies Review Cardio:      EKG:  Normal sinus rhythm, heart rate 89 per minute, left atrial enlargement, nonspecific ST and T-wave abnormality    Cardiac testing:   Results for orders placed during the hospital encounter of 20    Echo complete with contrast if indicated    Narrative  [address]  [address] [facility] [place]   [phone]    Transthoracic Echocardiogram  2D, M-mode, Doppler, and Color Doppler    Study date:  05-Dec-2020    Patient: Sharath Mcleod  MR number: [id number]  Account number: [de-identified]  : 1960  Age: 64 years  Gender: Male  Status: Inpatient  Location: Bedside  Height: 70 in  Weight: 159 7 lb  BP: 124/ 76 mmHg    Indications: A-Fib    Diagnoses: I48 0 - Atrial fibrillation    Sonographer:  Kat Benavides RDCS  Primary Physician:  Cassidy Pan  Referring Physician:  Amalia Holloway MD  Ordering Physician:  Amalia Holloway MD  Interpreting Physician:  Brooks Agee DO    SUMMARY    LEFT VENTRICLE:  Systolic function was normal by visual assessment  Ejection fraction was estimated in the range of 55 % to 60 %  There were no regional wall motion abnormalities  There was moderate concentric hypertrophy  Doppler parameters were consistent with abnormal left ventricular relaxation (grade 1 diastolic dysfunction)  LEFT ATRIUM:  The atrium was moderately dilated  MITRAL VALVE:  There was mild regurgitation  AORTIC VALVE:  There was no evidence for stenosis  There was no regurgitation  TRICUSPID VALVE:  There was mild regurgitation  Pulmonary artery systolic pressure was mildly to moderately increased  Estimated peak PA pressure was 55 mmHg  COMPARISONS:  There has been no significant interval change  Comparison was made with the previous study of 23-Dec-2019  HISTORY: PRIOR HISTORY: Alcohol Abuse, DM, HTN, HLD, CAD, COPD    PROCEDURE: The procedure was performed at the bedside  This was a routine study  The transthoracic approach was used  The study included complete 2D imaging, M-mode, complete spectral Doppler, and color Doppler  The heart rate was 91 bpm,  at the start of the study  Image quality was adequate  LEFT VENTRICLE: Size was normal  Systolic function was normal by visual assessment  Ejection fraction was estimated in the range of 55 % to 60 %  There were no regional wall motion abnormalities  There was moderate concentric hypertrophy  DOPPLER: Doppler parameters were consistent with abnormal left ventricular relaxation (grade 1 diastolic dysfunction)  RIGHT VENTRICLE: The size was normal  Systolic function was normal  DOPPLER: Systolic pressure was within the normal range  LEFT ATRIUM: The atrium was moderately dilated  RIGHT ATRIUM: The atrium was mildly dilated  MITRAL VALVE: Valve structure was normal  There was normal leaflet separation  No echocardiographic evidence for prolapse  DOPPLER: The transmitral velocity was within the normal range  There was no evidence for stenosis   There was mild  regurgitation  AORTIC VALVE: The valve was trileaflet  Leaflets exhibited normal thickness and normal cuspal separation  DOPPLER: Transaortic velocity was within the normal range  There was no evidence for stenosis  There was no regurgitation  TRICUSPID VALVE: The valve structure was normal  There was normal leaflet separation  DOPPLER: The transtricuspid velocity was within the normal range  There was mild regurgitation  Pulmonary artery systolic pressure was mildly to  moderately increased  Estimated peak PA pressure was 55 mmHg  PULMONIC VALVE: Leaflets exhibited normal thickness, no calcification, and normal cuspal separation  DOPPLER: The transpulmonic velocity was within the normal range  There was no regurgitation  PERICARDIUM: There was no thickening  There was no pericardial effusion  AORTA: The root exhibited normal size  PULMONARY ARTERY: The size was normal  The morphology appeared normal     SYSTEM MEASUREMENT TABLES    2D  EF (Teich): 59 98 %    PW  MV E/A Ratio: 3 5    [employer] Accredited Echocardiography Laboratory    Prepared and electronically signed by    Carolyn Seay DO  Signed 07-Dec-2020 10:55:08        Imaging:  Chest X-Ray:   No Chest XR results available for this patient  CT-scan of the chest:     No CTA results available for this patient    Lab Review   Lab Results   Component Value Date    WBC 7 07 10/26/2022    HGB 12 7 10/26/2022    HCT 38 4 10/26/2022    MCV 99 (H) 10/26/2022    RDW 13 9 10/26/2022     10/26/2022     BMP:  Lab Results   Component Value Date    SODIUM 134 (L) 11/22/2022    K 4 8 11/22/2022     11/22/2022    CO2 27 11/22/2022    BUN 22 11/22/2022    CREATININE 1 12 11/22/2022    GLUC 104 10/18/2022    GLUF 97 11/22/2022    CALCIUM 9 9 11/22/2022    CORRECTEDCA 9 7 10/26/2022    EGFR 71 11/22/2022    MG 2 1 11/22/2022     LFT:  Lab Results   Component Value Date    AST 49 (H) 11/22/2022    ALT 36 11/22/2022    ALKPHOS 66 11/22/2022    TP 7 6 11/22/2022    ALB 3 5 11/22/2022      No components found for: LYN [facility]  Lab Results   Component Value Date    RHD8XZCHUDPO 0 911 10/24/2021     Lab Results   Component Value Date    HGBA1C 5 1 10/17/2022     Lipid Profile:   Lab Results   Component Value Date    CHOLESTEROL 193 06/10/2022    HDL 83 06/10/2022    LDLCALC 100 06/10/2022    TRIG 51 06/10/2022     Lab Results   Component Value Date    CHOLESTEROL 193 06/10/2022    CHOLESTEROL 191 11/19/2021     Lab Results   Component Value Date    CKTOTAL 246 04/22/2021    CKMB 2 0 04/22/2021    CKMBINDEX <1 0 04/22/2021    TROPONINI <0 02 06/25/2021     Lab Results   Component Value Date    NTBNP 481 (H) 12/22/2019      Recent Results (from the past 672 hour(s))   Comprehensive metabolic panel    Collection Time: 11/22/22  9:52 AM   Result Value Ref Range    Sodium 134 (L) 135 - 147 mmol/L    Potassium 4 8 3 5 - 5 3 mmol/L    Chloride 102 96 - 108 mmol/L    CO2 27 21 - 32 mmol/L    ANION GAP 5 4 - 13 mmol/L    BUN 22 5 - 25 mg/dL    Creatinine 1 12 0 60 - 1 30 mg/dL    Glucose, Fasting 97 65 - 99 mg/dL    Calcium 9 9 8 3 - 10 1 mg/dL    AST 49 (H) 5 - 45 U/L    ALT 36 12 - 78 U/L    Alkaline Phosphatase 66 46 - 116 U/L    Total Protein 7 6 6 4 - 8 4 g/dL    Albumin 3 5 3 5 - 5 0 g/dL    Total Bilirubin 0 53 0 20 - 1 00 mg/dL    eGFR 71 ml/min/1 73sq m   Magnesium    Collection Time: 11/22/22  9:52 AM   Result Value Ref Range    Magnesium 2 1 1 6 - 2 6 mg/dL             Dr Nicki Bustillo MD, [facility]      "This note has been constructed using a voice recognition system  Therefore there may be syntax, spelling, and/or grammatical errors   Please call if you have any questions  "

## 2022-11-23 ENCOUNTER — APPOINTMENT (EMERGENCY)
Dept: RADIOLOGY | Facility: HOSPITAL | Age: 62
End: 2022-11-23

## 2022-11-23 ENCOUNTER — HOSPITAL ENCOUNTER (INPATIENT)
Facility: HOSPITAL | Age: 62
LOS: 2 days | Discharge: HOME | End: 2022-11-25
Attending: EMERGENCY MEDICINE | Admitting: FAMILY MEDICINE

## 2022-11-23 ENCOUNTER — CONSULT (OUTPATIENT)
Dept: CARDIOLOGY CLINIC | Facility: CLINIC | Age: 62
End: 2022-11-23

## 2022-11-23 VITALS
BODY MASS INDEX: 25.8 KG/M2 | WEIGHT: 201 LBS | HEIGHT: 74 IN | DIASTOLIC BLOOD PRESSURE: 100 MMHG | TEMPERATURE: 99.3 F | SYSTOLIC BLOOD PRESSURE: 200 MMHG | HEART RATE: 89 BPM | OXYGEN SATURATION: 97 %

## 2022-11-23 DIAGNOSIS — Z95.1 HX OF CABG: Chronic | ICD-10-CM

## 2022-11-23 DIAGNOSIS — I10 HYPERTENSION: ICD-10-CM

## 2022-11-23 DIAGNOSIS — R77.8 ELEVATED TROPONIN: ICD-10-CM

## 2022-11-23 DIAGNOSIS — I10 ESSENTIAL (PRIMARY) HYPERTENSION: Chronic | ICD-10-CM

## 2022-11-23 DIAGNOSIS — I50.32 CHRONIC HEART FAILURE WITH PRESERVED EJECTION FRACTION (HCC): Primary | ICD-10-CM

## 2022-11-23 DIAGNOSIS — Z72.0 NICOTINE ABUSE: Chronic | ICD-10-CM

## 2022-11-23 DIAGNOSIS — I25.10 CORONARY ARTERY DISEASE, UNSPECIFIED VESSEL OR LESION TYPE, UNSPECIFIED WHETHER ANGINA PRESENT, UNSPECIFIED WHETHER NATIVE OR TRANSPLANTED HEART: ICD-10-CM

## 2022-11-23 DIAGNOSIS — I20.8 STABLE ANGINA (HCC): ICD-10-CM

## 2022-11-23 DIAGNOSIS — I16.0 HYPERTENSIVE URGENCY: ICD-10-CM

## 2022-11-23 DIAGNOSIS — I21.4 NSTEMI (NON-ST ELEVATED MYOCARDIAL INFARCTION) (HCC): Primary | ICD-10-CM

## 2022-11-23 LAB
2HR DELTA HS TROPONIN: -3 NG/L
4HR DELTA HS TROPONIN: -4 NG/L
ALBUMIN SERPL BCP-MCNC: 4 G/DL (ref 3.5–5)
ALP SERPL-CCNC: 60 U/L (ref 34–104)
ALT SERPL W P-5'-P-CCNC: 29 U/L (ref 7–52)
AMPHETAMINES SERPL QL SCN: NEGATIVE
ANION GAP SERPL CALCULATED.3IONS-SCNC: 8 MMOL/L (ref 4–13)
AST SERPL W P-5'-P-CCNC: 42 U/L (ref 13–39)
BACTERIA UR QL AUTO: NORMAL /HPF
BARBITURATES UR QL: NEGATIVE
BASOPHILS # BLD AUTO: 0.08 THOUSANDS/ÂΜL (ref 0–0.1)
BASOPHILS NFR BLD AUTO: 1 % (ref 0–1)
BENZODIAZ UR QL: NEGATIVE
BILIRUB SERPL-MCNC: 0.56 MG/DL (ref 0.2–1)
BILIRUB UR QL STRIP: NEGATIVE
BUN SERPL-MCNC: 22 MG/DL (ref 5–25)
CALCIUM SERPL-MCNC: 9.8 MG/DL (ref 8.4–10.2)
CARDIAC TROPONIN I PNL SERPL HS: 55 NG/L
CARDIAC TROPONIN I PNL SERPL HS: 56 NG/L
CARDIAC TROPONIN I PNL SERPL HS: 59 NG/L
CHLORIDE SERPL-SCNC: 97 MMOL/L (ref 96–108)
CLARITY UR: CLEAR
CO2 SERPL-SCNC: 28 MMOL/L (ref 21–32)
COCAINE UR QL: NEGATIVE
COLOR UR: YELLOW
CREAT SERPL-MCNC: 1.21 MG/DL (ref 0.6–1.3)
EOSINOPHIL # BLD AUTO: 0.08 THOUSAND/ÂΜL (ref 0–0.61)
EOSINOPHIL NFR BLD AUTO: 1 % (ref 0–6)
ERYTHROCYTE [DISTWIDTH] IN BLOOD BY AUTOMATED COUNT: 13.2 % (ref 11.6–15.1)
GFR SERPL CREATININE-BSD FRML MDRD: 64 ML/MIN/1.73SQ M
GLUCOSE SERPL-MCNC: 112 MG/DL (ref 65–140)
GLUCOSE SERPL-MCNC: 121 MG/DL (ref 65–140)
GLUCOSE UR STRIP-MCNC: NEGATIVE MG/DL
HCT VFR BLD AUTO: 37.9 % (ref 36.5–49.3)
HGB BLD-MCNC: 12.8 G/DL (ref 12–17)
HGB UR QL STRIP.AUTO: NEGATIVE
IMM GRANULOCYTES # BLD AUTO: 0.03 THOUSAND/UL (ref 0–0.2)
IMM GRANULOCYTES NFR BLD AUTO: 1 % (ref 0–2)
KETONES UR STRIP-MCNC: NEGATIVE MG/DL
LEUKOCYTE ESTERASE UR QL STRIP: NEGATIVE
LYMPHOCYTES # BLD AUTO: 1.29 THOUSANDS/ÂΜL (ref 0.6–4.47)
LYMPHOCYTES NFR BLD AUTO: 21 % (ref 14–44)
MCH RBC QN AUTO: 33.2 PG (ref 26.8–34.3)
MCHC RBC AUTO-ENTMCNC: 33.8 G/DL (ref 31.4–37.4)
MCV RBC AUTO: 98 FL (ref 82–98)
METHADONE UR QL: NEGATIVE
MONOCYTES # BLD AUTO: 0.66 THOUSAND/ÂΜL (ref 0.17–1.22)
MONOCYTES NFR BLD AUTO: 11 % (ref 4–12)
NEUTROPHILS # BLD AUTO: 3.89 THOUSANDS/ÂΜL (ref 1.85–7.62)
NEUTS SEG NFR BLD AUTO: 65 % (ref 43–75)
NITRITE UR QL STRIP: NEGATIVE
NON-SQ EPI CELLS URNS QL MICRO: NORMAL /HPF
NRBC BLD AUTO-RTO: 0 /100 WBCS
OPIATES UR QL SCN: NEGATIVE
OXYCODONE+OXYMORPHONE UR QL SCN: NEGATIVE
PCP UR QL: NEGATIVE
PH UR STRIP.AUTO: 7 [PH]
PLATELET # BLD AUTO: 184 THOUSANDS/UL (ref 149–390)
PMV BLD AUTO: 9.7 FL (ref 8.9–12.7)
POTASSIUM SERPL-SCNC: 4.7 MMOL/L (ref 3.5–5.3)
PROT SERPL-MCNC: 7.5 G/DL (ref 6.4–8.4)
PROT UR STRIP-MCNC: ABNORMAL MG/DL
RBC # BLD AUTO: 3.85 MILLION/UL (ref 3.88–5.62)
RBC #/AREA URNS AUTO: NORMAL /HPF
SODIUM SERPL-SCNC: 133 MMOL/L (ref 135–147)
SP GR UR STRIP.AUTO: 1.01 (ref 1–1.03)
THC UR QL: NEGATIVE
TSH SERPL DL<=0.05 MIU/L-ACNC: 0.52 UIU/ML (ref 0.45–4.5)
UROBILINOGEN UR QL STRIP.AUTO: 0.2 E.U./DL
WBC # BLD AUTO: 6.03 THOUSAND/UL (ref 4.31–10.16)
WBC #/AREA URNS AUTO: NORMAL /HPF

## 2022-11-23 RX ORDER — NICOTINE 21 MG/24HR
1 PATCH, TRANSDERMAL 24 HOURS TRANSDERMAL DAILY
Status: DISCONTINUED | OUTPATIENT
Start: 2022-11-23 | End: 2022-11-25 | Stop reason: HOSPADM

## 2022-11-23 RX ORDER — ENOXAPARIN SODIUM 100 MG/ML
40 INJECTION SUBCUTANEOUS DAILY
Status: DISCONTINUED | OUTPATIENT
Start: 2022-11-23 | End: 2022-11-25 | Stop reason: HOSPADM

## 2022-11-23 RX ORDER — LABETALOL HYDROCHLORIDE 5 MG/ML
10 INJECTION, SOLUTION INTRAVENOUS ONCE
Status: COMPLETED | OUTPATIENT
Start: 2022-11-23 | End: 2022-11-23

## 2022-11-23 RX ORDER — PRAVASTATIN SODIUM 40 MG
40 TABLET ORAL
Status: DISCONTINUED | OUTPATIENT
Start: 2022-11-23 | End: 2022-11-25 | Stop reason: HOSPADM

## 2022-11-23 RX ORDER — GABAPENTIN 300 MG/1
300 CAPSULE ORAL 3 TIMES DAILY
Status: DISCONTINUED | OUTPATIENT
Start: 2022-11-23 | End: 2022-11-25 | Stop reason: HOSPADM

## 2022-11-23 RX ORDER — ALBUTEROL SULFATE 90 UG/1
2 AEROSOL, METERED RESPIRATORY (INHALATION) EVERY 4 HOURS PRN
Status: DISCONTINUED | OUTPATIENT
Start: 2022-11-23 | End: 2022-11-25 | Stop reason: HOSPADM

## 2022-11-23 RX ORDER — TAMSULOSIN HYDROCHLORIDE 0.4 MG/1
0.4 CAPSULE ORAL DAILY
Status: DISCONTINUED | OUTPATIENT
Start: 2022-11-23 | End: 2022-11-25 | Stop reason: HOSPADM

## 2022-11-23 RX ORDER — ASPIRIN 325 MG
325 TABLET ORAL ONCE
Status: COMPLETED | OUTPATIENT
Start: 2022-11-23 | End: 2022-11-23

## 2022-11-23 RX ORDER — LISINOPRIL 5 MG/1
5 TABLET ORAL DAILY
Status: DISCONTINUED | OUTPATIENT
Start: 2022-11-23 | End: 2022-11-23

## 2022-11-23 RX ORDER — FAMOTIDINE 20 MG/1
20 TABLET, FILM COATED ORAL 2 TIMES DAILY
Status: DISCONTINUED | OUTPATIENT
Start: 2022-11-23 | End: 2022-11-25 | Stop reason: HOSPADM

## 2022-11-23 RX ORDER — LISINOPRIL 20 MG/1
20 TABLET ORAL DAILY
Status: DISCONTINUED | OUTPATIENT
Start: 2022-11-24 | End: 2022-11-25 | Stop reason: HOSPADM

## 2022-11-23 RX ORDER — FERROUS SULFATE 325(65) MG
325 TABLET ORAL 2 TIMES DAILY WITH MEALS
Status: DISCONTINUED | OUTPATIENT
Start: 2022-11-23 | End: 2022-11-25 | Stop reason: HOSPADM

## 2022-11-23 RX ORDER — FOLIC ACID 1 MG/1
1000 TABLET ORAL DAILY
Status: DISCONTINUED | OUTPATIENT
Start: 2022-11-23 | End: 2022-11-25 | Stop reason: HOSPADM

## 2022-11-23 RX ORDER — LANOLIN ALCOHOL/MO/W.PET/CERES
100 CREAM (GRAM) TOPICAL DAILY
Status: DISCONTINUED | OUTPATIENT
Start: 2022-11-23 | End: 2022-11-25 | Stop reason: HOSPADM

## 2022-11-23 RX ORDER — FLUTICASONE FUROATE AND VILANTEROL 200; 25 UG/1; UG/1
1 POWDER RESPIRATORY (INHALATION) DAILY
Status: DISCONTINUED | OUTPATIENT
Start: 2022-11-23 | End: 2022-11-25 | Stop reason: HOSPADM

## 2022-11-23 RX ORDER — RANOLAZINE 500 MG/1
500 TABLET, EXTENDED RELEASE ORAL EVERY 12 HOURS
Status: DISCONTINUED | OUTPATIENT
Start: 2022-11-23 | End: 2022-11-25

## 2022-11-23 RX ORDER — ASPIRIN 81 MG/1
81 TABLET ORAL DAILY
Status: DISCONTINUED | OUTPATIENT
Start: 2022-11-24 | End: 2022-11-25 | Stop reason: HOSPADM

## 2022-11-23 RX ORDER — INSULIN LISPRO 100 [IU]/ML
1-6 INJECTION, SOLUTION INTRAVENOUS; SUBCUTANEOUS
Status: DISCONTINUED | OUTPATIENT
Start: 2022-11-23 | End: 2022-11-25 | Stop reason: HOSPADM

## 2022-11-23 RX ORDER — LORAZEPAM 2 MG/ML
4 INJECTION INTRAMUSCULAR ONCE
Status: COMPLETED | OUTPATIENT
Start: 2022-11-23 | End: 2022-11-23

## 2022-11-23 RX ORDER — CARVEDILOL 6.25 MG/1
6.25 TABLET ORAL 2 TIMES DAILY WITH MEALS
Status: DISCONTINUED | OUTPATIENT
Start: 2022-11-23 | End: 2022-11-24

## 2022-11-23 RX ORDER — LORAZEPAM 2 MG/ML
4 INJECTION INTRAMUSCULAR ONCE
Status: DISCONTINUED | OUTPATIENT
Start: 2022-11-23 | End: 2022-11-23

## 2022-11-23 RX ORDER — PANTOPRAZOLE SODIUM 40 MG/1
40 TABLET, DELAYED RELEASE ORAL
Status: DISCONTINUED | OUTPATIENT
Start: 2022-11-24 | End: 2022-11-25 | Stop reason: HOSPADM

## 2022-11-23 RX ADMIN — PRAVASTATIN SODIUM 40 MG: 40 TABLET ORAL at 16:27

## 2022-11-23 RX ADMIN — CARVEDILOL 6.25 MG: 6.25 TABLET, FILM COATED ORAL at 16:27

## 2022-11-23 RX ADMIN — LORAZEPAM 4 MG: 2 INJECTION INTRAMUSCULAR; INTRAVENOUS at 16:45

## 2022-11-23 RX ADMIN — GABAPENTIN 300 MG: 300 CAPSULE ORAL at 21:24

## 2022-11-23 RX ADMIN — THIAMINE HCL TAB 100 MG 100 MG: 100 TAB at 14:57

## 2022-11-23 RX ADMIN — LABETALOL HYDROCHLORIDE 10 MG: 5 INJECTION, SOLUTION INTRAVENOUS at 12:07

## 2022-11-23 RX ADMIN — FLUTICASONE FUROATE AND VILANTEROL TRIFENATATE 1 PUFF: 200; 25 POWDER RESPIRATORY (INHALATION) at 17:32

## 2022-11-23 RX ADMIN — FOLIC ACID 1000 MCG: 1 TABLET ORAL at 14:57

## 2022-11-23 RX ADMIN — TAMSULOSIN HYDROCHLORIDE 0.4 MG: 0.4 CAPSULE ORAL at 14:57

## 2022-11-23 RX ADMIN — FAMOTIDINE 20 MG: 20 TABLET ORAL at 17:32

## 2022-11-23 RX ADMIN — FERROUS SULFATE TAB 325 MG (65 MG ELEMENTAL FE) 325 MG: 325 (65 FE) TAB at 16:27

## 2022-11-23 RX ADMIN — NICOTINE 1 PATCH: 21 PATCH, EXTENDED RELEASE TRANSDERMAL at 14:57

## 2022-11-23 RX ADMIN — RANOLAZINE 500 MG: 500 TABLET, EXTENDED RELEASE ORAL at 14:57

## 2022-11-23 RX ADMIN — ENOXAPARIN SODIUM 40 MG: 40 INJECTION SUBCUTANEOUS at 14:56

## 2022-11-23 RX ADMIN — MAGNESIUM OXIDE TAB 400 MG (241.3 MG ELEMENTAL MG) 400 MG: 400 (241.3 MG) TAB at 17:32

## 2022-11-23 RX ADMIN — ASPIRIN 325 MG: 325 TABLET ORAL at 12:24

## 2022-11-23 RX ADMIN — GABAPENTIN 300 MG: 300 CAPSULE ORAL at 16:27

## 2022-11-23 NOTE — ED PROVIDER NOTES
History  Chief Complaint   Patient presents with   • Hypertension     Sent over by cardiologist for eval of elevated blood pressure in office  To be cleared for eye surgery, some lightheadedness slight headache  Also did not take his meds yet today  States reg Dr cut his bp pills down to 1/2 tab 3 months ago     HPI  Patient is a 26-year-old male history of COPD, CKD, CAD status post CABG presenting for evaluation of hypertension  Patient was seen earlier in the day in cardiologist office where he was being evaluated for cardiac clearance to have cataract surgery performed  Patient was noted to be hypertensive to a systolic of greater than 501 at that time and was directed to come to the emergency department  Patient states that he did not take any of his blood pressure medications today and states that about a month ago his blood pressure medications were cut in half due to low normal blood pressure and episodes of lightheadedness  Patient states that shortly following this adjustment he started to develop chest pressure on exertion following prolonged ambulation  Patient denies any symptoms at rest   Patient denies any significant component of chest pain, nausea, vomiting, diaphoresis, syncope or near syncope  Patient denies headache, denies any vision changes from baseline of cataracts, focal weakness or numbness  Prior to Admission Medications   Prescriptions Last Dose Informant Patient Reported? Taking? Blood Glucose Monitoring Suppl (ONE TOUCH ULTRA MINI) w/Device KIT 11/23/2022  Yes Yes   Sig: Use as directed   Breo Ellipta 200-25 MCG/INH inhaler 11/23/2022  No Yes   Sig: INHALE 1 PUFF DAILY RINSE MOUTH AFTER USE     ONETOUCH DELICA LANCETS FINE MISC   Yes No   Sig: 3 (three) times a day Test   albuterol (Ventolin HFA) 90 mcg/act inhaler   No No   Sig: Inhale 2 puffs every 4 (four) hours as needed for wheezing   Patient not taking: Reported on 11/23/2022   famotidine (PEPCID) 20 mg tablet   No No Sig: Take 1 tablet (20 mg total) by mouth 2 (two) times a day   ferrous sulfate 324 (65 Fe) mg   No No   Sig: Take 1 tablet (324 mg total) by mouth 2 (two) times a day before meals   folic acid (FOLVITE) 1 mg tablet 11/22/2022  No Yes   Sig: Take 1 tablet (1,000 mcg total) by mouth daily   gabapentin (NEURONTIN) 300 mg capsule 11/22/2022  No Yes   Sig: TAKE ONE CAPSULE THREE TIMES DAILY   lisinopril (ZESTRIL) 10 mg tablet 11/22/2022  No Yes   Sig: Take 0 5 tablets (5 mg total) by mouth daily   magnesium Oxide (MAG-OX) 400 mg TABS 11/22/2022  No Yes   Sig: Take 1 tablet (400 mg total) by mouth 2 (two) times a day   metFORMIN (GLUCOPHAGE) 1000 MG tablet 11/22/2022  No Yes   Sig: Take 1 tablet (1,000 mg total) by mouth daily with breakfast   metoprolol tartrate (LOPRESSOR) 25 mg tablet 11/22/2022  No Yes   Sig: Take 0 5 tablets (12 5 mg total) by mouth every 12 (twelve) hours   omeprazole (PriLOSEC) 20 mg delayed release capsule 11/22/2022  No Yes   Sig: Take 1 capsule (20 mg total) by mouth daily   pravastatin (PRAVACHOL) 40 mg tablet 11/22/2022  No Yes   Sig: Take 1 tablet (40 mg total) by mouth daily with dinner   prednisoLONE acetate (PRED FORTE) 1 % ophthalmic suspension Not Taking  Yes No   Patient not taking: Reported on 11/23/2022   ranolazine (RANEXA) 500 mg 12 hr tablet 11/22/2022  No Yes   Sig: Take 1 tablet (500 mg total) by mouth every 12 (twelve) hours   tamsulosin (FLOMAX) 0 4 mg 11/22/2022  No Yes   Sig: TAKE 1 CAPSULE DAILY   thiamine 100 MG tablet 11/22/2022  No Yes   Sig: Take 1 tablet (100 mg total) by mouth daily      Facility-Administered Medications: None       Past Medical History:   Diagnosis Date   • Acute on chronic kidney failure (HCC)    • Alcohol withdrawal ([facility] ) 6/7/2019   • Cancer ([facility] )     prostate ca,had radiation   • Cardiac disease     stents,then triple bypass   • COPD (chronic obstructive pulmonary disease) ([address] )    • ETOH abuse    • Hx of heart artery stent     2014   • Hyperlipidemia    • Hypertension    • Hypovolemic shock ([facility] ) 12/22/2019   • Lumbar spondylitis ([facility] ) 10/13/2022   • Nasal bone fracture 10/10/2022   • Prostate CA ([facility] )    • S/P CABG x 1     2004       Past Surgical History:   Procedure Laterality Date   • CORONARY ARTERY BYPASS GRAFT  2004   • AK ARTHRODESIS ANT INTERBODY MIN DISCECTOMY, CERVICAL BELOW C2 N/A 12/16/2020    Procedure: Anterior cervical discectomy with fusion C4-C7; Posterior cervical decompression and fusion C2-T2;  Surgeon: Esteban Paulino MD;  Location: [facility];  Service: Neurosurgery   • TONSILLECTOMY         Family History   Problem Relation Age of Onset   • Diabetes Mother    • Uterine cancer Mother    • COPD Father    • Hypertension Father      I have reviewed and agree with the history as documented  E-Cigarette/Vaping   • E-Cigarette Use Never User      E-Cigarette/Vaping Substances   • Nicotine Yes    • THC No    • CBD No    • Flavoring No    • Other No    • Unknown No      Social History     Tobacco Use   • Smoking status: Every Day     Packs/day: 1 50     Years: 40 00     Pack years: 60 00     Types: Cigarettes   • Smokeless tobacco: Never   Vaping Use   • Vaping Use: Never used   Substance Use Topics   • Alcohol use: Yes     Alcohol/week: 4 0 standard drinks     Types: 4 Standard drinks or equivalent per week     Comment: yesterday   • Drug use: No       Review of Systems   Constitutional: Negative for chills, fatigue and fever  HENT: Negative for congestion, rhinorrhea and sore throat  Eyes: Negative for photophobia and visual disturbance  Respiratory: Positive for shortness of breath  Negative for chest tightness  Cardiovascular: Negative for chest pain, palpitations and leg swelling  Gastrointestinal: Negative for abdominal distention, abdominal pain, diarrhea, nausea and vomiting  Endocrine: Negative for polydipsia and polyuria  Genitourinary: Negative for dysuria and hematuria     Musculoskeletal: Negative for arthralgias and myalgias  Skin: Negative for color change, pallor, rash and wound  Neurological: Negative for weakness, numbness and headaches  Psychiatric/Behavioral: Negative for confusion  Physical Exam  Physical Exam  Vitals and nursing note reviewed  Constitutional:       General: He is not in acute distress  Appearance: He is well-developed and well-nourished  He is not diaphoretic  Comments: Well-appearing, nondistressed   HENT:      Head: Normocephalic and atraumatic  Comments: Pupils 3 mm bilaterally, reactive to light  Normal EOM  Right Ear: External ear normal       Left Ear: External ear normal       Nose: Nose normal       Mouth/Throat:      Mouth: Oropharynx is clear and moist       Pharynx: No oropharyngeal exudate  Eyes:      Conjunctiva/sclera: Conjunctivae normal       Pupils: Pupils are equal, round, and reactive to light  Cardiovascular:      Rate and Rhythm: Normal rate and regular rhythm  Pulses: Intact distal pulses  Heart sounds: Normal heart sounds  No murmur heard  No friction rub  No gallop  Comments: Regular rate and rhythm, no murmurs rubs or gallops  Extremities warm and well perfused  Pulmonary:      Effort: Pulmonary effort is normal  No respiratory distress  Breath sounds: Normal breath sounds  No wheezing  Comments: No increased work of breathing  Speaking complete sentences  Satting 95% on room air indicating adequate oxygenation  Lungs clear to auscultation bilaterally without wheezes, rales, rhonchi  Chest:      Chest wall: No tenderness  Abdominal:      General: Bowel sounds are normal  There is no distension  Palpations: Abdomen is soft  There is no mass  Tenderness: There is no abdominal tenderness  There is no guarding or rebound  Comments: Abdomen soft, nontender, nondistended without rigidity, rebound, guarding  Musculoskeletal:         General: No deformity or edema     Skin: General: Skin is warm and dry  Capillary Refill: Capillary refill takes less than 2 seconds  Neurological:      Mental Status: He is alert and oriented to person, place, and time  Comments: AAO x3  Cranial nerves 2-12 intact  Full strength and sensation bilaterally in upper and lower extremities     Psychiatric:         Mood and Affect: Mood and affect normal          Behavior: Behavior normal          Vital Signs  ED Triage Vitals [11/23/22 1115]   Temperature Pulse Respirations Blood Pressure SpO2   99 8 °F (37 7 °C) 100 20 (!) 210/100 96 %      Temp Source Heart Rate Source Patient Position - Orthostatic VS BP Location FiO2 (%)   Tympanic Monitor Sitting Right arm --      Pain Score       1           Vitals:    11/23/22 1215 11/23/22 1230 11/23/22 1245 11/23/22 1307   BP: 149/68 (!) 177/81 (!) 186/90 159/83   Pulse: 89 90 88 90   Patient Position - Orthostatic VS: Lying Lying Lying          Visual Acuity      ED Medications  Medications   famotidine (PEPCID) tablet 20 mg (has no administration in time range)   ferrous sulfate tablet 325 mg (has no administration in time range)   folic acid (FOLVITE) tablet 1,000 mcg (has no administration in time range)   lisinopril (ZESTRIL) tablet 5 mg (has no administration in time range)   magnesium oxide (MAG-OX) tablet 400 mg (has no administration in time range)   metoprolol tartrate (LOPRESSOR) partial tablet 12 5 mg (has no administration in time range)   pantoprazole (PROTONIX) EC tablet 40 mg (has no administration in time range)   pravastatin (PRAVACHOL) tablet 40 mg (has no administration in time range)   ranolazine (RANEXA) 12 hr tablet 500 mg (has no administration in time range)   thiamine tablet 100 mg (has no administration in time range)   albuterol (PROVENTIL HFA,VENTOLIN HFA) inhaler 2 puff (has no administration in time range)   gabapentin (NEURONTIN) capsule 300 mg (has no administration in time range)   tamsulosin (FLOMAX) capsule 0 4 mg (has no administration in time range)   nicotine (NICODERM CQ) 21 mg/24 hr TD 24 hr patch 1 patch (has no administration in time range)   enoxaparin (LOVENOX) subcutaneous injection 40 mg (has no administration in time range)   fluticasone-vilanterol 200-25 mcg/actuation 1 puff (has no administration in time range)   labetalol (NORMODYNE) injection 10 mg (10 mg Intravenous Given 11/23/22 1207)   aspirin tablet 325 mg (325 mg Oral Given 11/23/22 1224)       Diagnostic Studies  Results Reviewed     Procedure Component Value Units Date/Time    HS Troponin 0hr (reflex protocol) [[id number]]  (Abnormal) Collected: 11/23/22 1137    Lab Status: Final result Specimen: Blood from Arm, Right Updated: 11/23/22 1207     hs TnI 0hr 59 ng/L     HS Troponin I 2hr [[id number]]     Lab Status: No result Specimen: Blood     HS Troponin I 4hr [[id number]]     Lab Status: No result Specimen: Blood     CBC and differential [[id number]]  (Abnormal) Collected: 11/23/22 1137    Lab Status: Final result Specimen: Blood from Arm, Right Updated: 11/23/22 1142     WBC 6 03 Thousand/uL      RBC 3 85 Million/uL      Hemoglobin 12 8 g/dL      Hematocrit 37 9 %      MCV 98 fL      MCH 33 2 pg      MCHC 33 8 g/dL      RDW 13 2 %      MPV 9 7 fL      Platelets 687 Thousands/uL      nRBC 0 /100 WBCs      Neutrophils Relative 65 %      Immat GRANS % 1 %      Lymphocytes Relative 21 %      Monocytes Relative 11 %      Eosinophils Relative 1 %      Basophils Relative 1 %      Neutrophils Absolute 3 89 Thousands/µL      Immature Grans Absolute 0 03 Thousand/uL      Lymphocytes Absolute 1 29 Thousands/µL      Monocytes Absolute 0 66 Thousand/µL      Eosinophils Absolute 0 08 Thousand/µL      Basophils Absolute 0 08 Thousands/µL     Comprehensive metabolic panel [[id number]] Collected: 11/23/22 1137    Lab Status:  In process Specimen: Blood from Arm, Right Updated: 11/23/22 1140                 XR chest portable   Final Result by Caryle Mings, MD (11/23 1259) No acute cardiopulmonary disease  Workstation performed: [id number]                    Procedures  Procedures         ED Course                               SBIRT 22yo+    Flowsheet Row Most Recent Value   SBIRT (23 yo +)    In order to provide better care to our patients, we are screening all of our patients for alcohol and drug use  Would it be okay to ask you these screening questions? No Filed at: 11/23/2022 1142                    MDM  Number of Diagnoses or Management Options  Hypertension  NSTEMI (non-ST elevated myocardial infarction) [facility])  Diagnosis management comments: Patient with exertional chest pressure for the past month most consistent with stable angina  At time of arrival patient hypertensive to 210/100, asymptomatic with a nonfocal cardiopulmonary exam   EKG without ST or T-wave abnormalities  Cardiac workup including CBC, CMP, troponin  Patient's initial troponin of 59  Patient with improvement of blood pressure following IV labetalol, remaining asymptomatic in the emergency department however given elevated troponin, patient admitted to medicine service for further evaluation and treatment of NSTEMI        Disposition  Final diagnoses:   NSTEMI (non-ST elevated myocardial infarction) ([facility] )   Hypertension     Time reflects when diagnosis was documented in both MDM as applicable and the Disposition within this note     Time User Action Codes Description Comment    11/23/2022 12:34 PM Sasha Tate Add [R77 8] Elevated troponin     11/23/2022 12:34 PM Sasha Tate Remove [R77 8] Elevated troponin     11/23/2022 12:34 PM Sasha Tate Add [I21 4] NSTEMI (non-ST elevated myocardial infarction) ([facility] )     11/23/2022 12:34 PM Sasha Tate Add [I10] Hypertension     11/23/2022  1:43 PM Neeta Alexander [R77 8] Elevated troponin     11/23/2022  1:43 PM Jayant Eugene Add [I16 0] Hypertensive urgency       ED Disposition     ED Disposition   Admit Condition   Stable    Date/Time   Wed Nov 23, 2022 12:33 PM    Comment   Case was discussed with DORINDA and the patient's admission status was agreed to be Admission Status: inpatient status to the service of Dr Lo Yusuf   Follow-up Information    None         Current Discharge Medication List      CONTINUE these medications which have NOT CHANGED    Details   Blood Glucose Monitoring Suppl (ONE TOUCH ULTRA MINI) w/Device KIT Use as directed  Refills: 0      Breo Ellipta 200-25 MCG/INH inhaler INHALE 1 PUFF DAILY RINSE MOUTH AFTER USE    Qty: 60 each, Refills: 0    Associated Diagnoses: COPD (chronic obstructive pulmonary disease) ([facility])      folic acid (FOLVITE) 1 mg tablet Take 1 tablet (1,000 mcg total) by mouth daily  Qty: 90 tablet, Refills: 2    Associated Diagnoses: Alcohol abuse      gabapentin (NEURONTIN) 300 mg capsule TAKE ONE CAPSULE THREE TIMES DAILY  Qty: 90 capsule, Refills: 1    Associated Diagnoses: Edema, spinal cord ([facility])      lisinopril (ZESTRIL) 10 mg tablet Take 0 5 tablets (5 mg total) by mouth daily  Qty: 90 tablet, Refills: 0    Associated Diagnoses: Essential (primary) hypertension      magnesium Oxide (MAG-OX) 400 mg TABS Take 1 tablet (400 mg total) by mouth 2 (two) times a day  Qty: 120 tablet, Refills: 2    Associated Diagnoses: Alcohol abuse      metFORMIN (GLUCOPHAGE) 1000 MG tablet Take 1 tablet (1,000 mg total) by mouth daily with breakfast  Qty: 180 tablet, Refills: 0    Associated Diagnoses: Type 2 diabetes mellitus with hyperosmolarity without coma, without long-term current use of insulin ([facility])      metoprolol tartrate (LOPRESSOR) 25 mg tablet Take 0 5 tablets (12 5 mg total) by mouth every 12 (twelve) hours  Qty: 180 tablet, Refills: 2    Associated Diagnoses: Essential (primary) hypertension      omeprazole (PriLOSEC) 20 mg delayed release capsule Take 1 capsule (20 mg total) by mouth daily  Qty: 90 capsule, Refills: 3    Associated Diagnoses: Gastroesophageal reflux disease, unspecified whether esophagitis present      pravastatin (PRAVACHOL) 40 mg tablet Take 1 tablet (40 mg total) by mouth daily with dinner  Qty: 90 tablet, Refills: 2    Associated Diagnoses: Essential (primary) hypertension      ranolazine (RANEXA) 500 mg 12 hr tablet Take 1 tablet (500 mg total) by mouth every 12 (twelve) hours  Qty: 60 tablet, Refills: 3    Associated Diagnoses: Essential (primary) hypertension      tamsulosin (FLOMAX) 0 4 mg TAKE 1 CAPSULE DAILY  Qty: 90 capsule, Refills: 1    Associated Diagnoses: Hx of prostatic malignancy      thiamine 100 MG tablet Take 1 tablet (100 mg total) by mouth daily  Qty: 90 tablet, Refills: 0    Associated Diagnoses: Alcohol abuse      albuterol (Ventolin HFA) 90 mcg/act inhaler Inhale 2 puffs every 4 (four) hours as needed for wheezing  Qty: 18 g, Refills: 0    Comments: Substitution to a formulary equivalent within the same pharmaceutical class is authorized  Associated Diagnoses: COPD (chronic obstructive pulmonary disease) (HCC)      famotidine (PEPCID) 20 mg tablet Take 1 tablet (20 mg total) by mouth 2 (two) times a day  Qty: 30 tablet, Refills: 0    Associated Diagnoses: GERD (gastroesophageal reflux disease)      ferrous sulfate 324 (65 Fe) mg Take 1 tablet (324 mg total) by mouth 2 (two) times a day before meals  Qty: 60 tablet, Refills: 2    Associated Diagnoses: Alcohol abuse      ONETOUCH DELICA LANCETS FINE MISC 3 (three) times a day Test  Refills: 0      prednisoLONE acetate (PRED FORTE) 1 % ophthalmic suspension              No discharge procedures on file      PDMP Review       Value Time User    PDMP Reviewed  Yes 7/26/2021  4:50 PM Gus Swenson MD          ED Provider  Electronically Signed by           Aaron Wen MD  11/23/22 2052 0086

## 2022-11-23 NOTE — ASSESSMENT & PLAN NOTE
Lab Results   Component Value Date    HGBA1C 5 1 10/17/2022       Recent Labs     11/23/22 2126 11/24/22  0730   POCGLU 112 122       Blood Sugar Average: Last 72 hrs:  (P) 117     Home metformin held  On ISS

## 2022-11-23 NOTE — PLAN OF CARE
Problem: PAIN - ADULT  Goal: Verbalizes/displays adequate comfort level or baseline comfort level  Description: Interventions:  - Encourage patient to monitor pain and request assistance  - Assess pain using appropriate pain scale  - Administer analgesics based on type and severity of pain and evaluate response  - Implement non-pharmacological measures as appropriate and evaluate response  - Consider cultural and social influences on pain and pain management  - Notify physician/advanced practitioner if interventions unsuccessful or patient reports new pain  Outcome: Progressing     Problem: INFECTION - ADULT  Goal: Absence or prevention of progression during hospitalization  Description: INTERVENTIONS:  - Assess and monitor for signs and symptoms of infection  - Monitor lab/diagnostic results  - Monitor all insertion sites, i e  indwelling lines, tubes, and drains  - Monitor endotracheal if appropriate and nasal secretions for changes in amount and color  - [place] appropriate cooling/warming therapies per order  - Administer medications as ordered  - Instruct and encourage patient and family to use good hand hygiene technique  - Identify and instruct in appropriate isolation precautions for identified infection/condition  Outcome: Progressing  Goal: Absence of fever/infection during neutropenic period  Description: INTERVENTIONS:  - Monitor WBC    Outcome: Progressing     Problem: SAFETY ADULT  Goal: Patient will remain free of falls  Description: INTERVENTIONS:  - Educate patient/family on patient safety including physical limitations  - Instruct patient to call for assistance with activity   - Consult OT/PT to assist with strengthening/mobility   - Keep Call bell within reach  - Keep bed low and locked with side rails adjusted as appropriate  - Keep care items and personal belongings within reach  - Initiate and maintain comfort rounds  - Make Fall Risk Sign visible to staff  - Initiate/Maintain bed alarm  - Obtain necessary fall risk management equipment: walker  - Apply yellow socks and bracelet for high fall risk patients  - Consider moving patient to room near nurses station  Outcome: Progressing  Goal: Maintain or return to baseline ADL function  Description: INTERVENTIONS:  -  Assess patient's ability to carry out ADLs; assess patient's baseline for ADL function and identify physical deficits which impact ability to perform ADLs (bathing, care of mouth/teeth, toileting, grooming, dressing, etc )  - Assess/evaluate cause of self-care deficits   - Assess range of motion  - Assess patient's mobility; develop plan if impaired  - Assess patient's need for assistive devices and provide as appropriate  - Encourage maximum independence but intervene and supervise when necessary  - Involve family in performance of ADLs  - Assess for home care needs following discharge   - Consider OT consult to assist with ADL evaluation and planning for discharge  - Provide patient education as appropriate  Outcome: Progressing  Goal: Maintains/Returns to pre admission functional level  Description: INTERVENTIONS:  - Perform BMAT or MOVE assessment daily    - Set and communicate daily mobility goal to care team and patient/family/caregiver     - Collaborate with rehabilitation services on mobility goals if consulted  - Stand patient 3 times a day  - Ambulate patient 3 times a day  - Out of bed to chair 3 times a day   - Out of bed for meals 3 times a day  - Out of bed for toileting  - Record patient progress and toleration of activity level   Outcome: Progressing     Problem: DISCHARGE PLANNING  Goal: Discharge to home or other facility with appropriate resources  Description: INTERVENTIONS:  - Identify barriers to discharge w/patient and caregiver  - Arrange for needed discharge resources and transportation as appropriate  - Identify discharge learning needs (meds, wound care, etc )  - Arrange for interpretive services to assist at discharge as needed  - Refer to Case Management Department for coordinating discharge planning if the patient needs post-hospital services based on physician/advanced practitioner order or complex needs related to functional status, cognitive ability, or social support system  Outcome: Progressing     Problem: Knowledge Deficit  Goal: Patient/family/caregiver demonstrates understanding of disease process, treatment plan, medications, and discharge instructions  Description: Complete learning assessment and assess knowledge base    Interventions:  - Provide teaching at level of understanding  - Provide teaching via preferred learning methods  Outcome: Progressing

## 2022-11-23 NOTE — ASSESSMENT & PLAN NOTE
Chronic, stable    Home meds:  Increased Lisinopril 5 mg to lisinopril 20 mg,   Discontinued Lopressor 12 5 mg b i d    Added carvedilol 6 25 mg BID, aspirin 81 mg

## 2022-11-23 NOTE — ASSESSMENT & PLAN NOTE
62-year-old male with PMH of CAD s/p CABG presented with worsening SOB and chest tightness, and patient feels hypertension could be related to recent change in BP medications  He was sent in from Dr Vasquez List office for hypertensive urgency  He saw Dr Carmenza Sidhu in office to be cleared for cataract surgery on 11/28  · ED course: BP elevated at 224/105, was given labetalol 10 mg which brought down the BP to 159/83  Patient complained of chest pain, and found to have elevated troponin (peak 59)  · Echo in 2020 showed moderate concentric LVH with EF of 55-60%   · UDS: normal  · EKG: NSR, possible left atrial enlargement, similar to previous EKGs in the past  · Troponin: 59>56>55  · CXR:  No acute cardiopulmonary disease    Plan:  · 48 hour telemetry  · Secondary HTN workup:  [facility] AS renal scan, renin aldosterone ratio, plasma renin and aldosterone levels  · Echo and Lexiscan nuclear stress test on Friday     · Cardiology consulted, appreciate recs  · Increased lisinopril to 20 mg daily  · carvedilol 6 25 mg b i d  (holding 11/25 AM prior to [facility])  · aspirin 81 mg daily  · Discontinued Lopressor from home med

## 2022-11-23 NOTE — H&P
H&P Exam - Minna Mendenhall 63 y o  male MRN: [id number]    Unit/Bed#: [address] [place] [address] Encounter: [id number]      Assessment/Plan   * Hypertensive urgency  Assessment & Plan  Admission blood pressures:  210/100, 224/105, 203/91, 181/71, 149/68, 177/81, 186/90, 159/83  CBC:  WNL  CMP: Na 133, AST 42  UA & UDS: pending   EKG:  Abnormal, similar to previous EKGs in the past  Troponin: elevated, however similar to previous troponin   59>56  chest x-ray:  No acute cardiopulmonary disease  48 hour telemetry  Trend troponin  2D echo pending  Lexiscan nuclear stress test pending    ED course:  Labetalol 10 mg  Continue home med: Increased lisinopril 5 mg to Lisinopril 20 mg  Discontinued: Lopressor 12 5 mg b i d  Added carvedilol 6 25 mg, aspirin 81 mg    Elevated troponin  Assessment & Plan  Troponin on admission was 59>56  To our serial troponins ordered  Troponin on 04/09/2022 was 97    Trend troponin  P m  EKG  48 hour telemetry    Essential hypertension  Assessment & Plan  Chronic, stable    Home meds:  Increased Lisinopril 5 mg to lisinopril 20 mg,   Discontinued Lopressor 12 5 mg b i d  Added carvedilol 6 25 mg, aspirin 81 mg    Hx of CABG  Assessment & Plan  Troponin level elevated at 59>56  Admission on 04/09/2022 showed an troponin level 97  Denies chest pain, nausea, vomiting, diaphoresis    48 hour telemetry  Trend troponins  Aspirin 81 mg    COPD (chronic obstructive pulmonary disease) ([facility])  Assessment & Plan  Chronic, stable  Continue home inhalers Breo Ellipta and albuterol    CAD (coronary artery disease)  Assessment & Plan  History of CAD status post CABG 2004, PCI to  2014  Was not a candidate for Eliquis secondary to extensive high chronic alcohol use, history of all, there was anemia, and increased risk of bleeding  Continue home medications: Ranexa 12 mg b i d  Discontinued: Lopressor 12 5 mg b i d    Added carvedilol 6 25 mg b i d , aspirin 81 mg    Type 2 diabetes mellitus ([facility] [address] )  Assessment & Plan  Lab Results   Component Value Date    HGBA1C 5 1 10/17/2022       No results for input(s): POCGLU in the last 72 hours  Blood Sugar Average: Last 72 hrs:    Home metformin held      Dyslipidemia  Assessment & Plan  Continue home medication:  Statin    Alcohol abuse  Assessment & Plan  Chronic  Continue thiamine and folic acid  UDS pending    Nicotine abuse  Assessment & Plan  Continue nicotine patch          History of Present Illness     HPI:  Odette Chilel is a 63 y o  male who presents with hypertension  He was sent over cardiologist due to elevated systolic pressures greater than 200  He notes that he did not take his blood pressure today  He notes that about a month ago his blood pressure medication was cut in half due to episodes of lower blood pressure and lightheadedness  He notes that he gets chest tightness and shortness of breath on exertion  He denied any nausea, vomiting, diaphoresis, syncope, chest pain, headache, weakness, numbness, or changes in vision from his baseline (bilateral cataracts)      PCP: Franklin Roque MD    Review of Systems   Constitutional: Negative  HENT: Negative  Eyes: Negative  Respiratory: Positive for cough, chest tightness (on exertion ) and shortness of breath  Negative for choking, wheezing and stridor  Cardiovascular: Negative  Gastrointestinal: Negative  Endocrine: Negative  Genitourinary: Negative  Musculoskeletal: Positive for neck pain (chronic, hx of surgery)  Negative for back pain, joint swelling and neck stiffness  Skin: Negative  Allergic/Immunologic: Negative  Neurological: Positive for dizziness  Negative for facial asymmetry and light-headedness  Hematological: Negative  Psychiatric/Behavioral: Negative          Historical Information   Past Medical History:   Diagnosis Date   • Acute on chronic kidney failure ([facility] [address] )    • Alcohol withdrawal ([facility] [address] ) 6/7/2019   • Cancer [facility])     prostate ca,had radiation   • Cardiac disease     stents,then triple bypass   • COPD (chronic obstructive pulmonary disease) (HCC)    • ETOH abuse    • Hx of heart artery stent     2014   • Hyperlipidemia    • Hypertension    • Hypovolemic shock ([facility] [address] ) 12/22/2019   • Lumbar spondylitis ([facility] [address] ) 10/13/2022   • Nasal bone fracture 10/10/2022   • Prostate CA ([facility] [address] )    • S/P CABG x 1     2004     Past Surgical History:   Procedure Laterality Date   • CORONARY ARTERY BYPASS GRAFT  2004   • DC ARTHRODESIS ANT INTERBODY MIN DISCECTOMY, CERVICAL BELOW C2 N/A 12/16/2020    Procedure: Anterior cervical discectomy with fusion C4-C7; Posterior cervical decompression and fusion C2-T2;  Surgeon: Shay Hassan MD;  [facility]: [facility];  Service: Neurosurgery   • TONSILLECTOMY       Social History   Social History     Substance and Sexual Activity   Alcohol Use Yes   • Alcohol/week: 4 0 standard drinks   • Types: 4 Standard drinks or equivalent per week    Comment: yesterday     Social History     Substance and Sexual Activity   Drug Use No     Social History     Tobacco Use   Smoking Status Every Day   • Packs/day: 1 50   • Years: 40 00   • Pack years: 60 00   • Types: Cigarettes   Smokeless Tobacco Never     E-Cigarette/Vaping   • E-Cigarette Use Never User       E-Cigarette/Vaping Substances   • Nicotine Yes    • THC No    • CBD No    • Flavoring No    • Other No    • Unknown No      Family History:   Family History   Problem Relation Age of Onset   • Diabetes Mother    • Uterine cancer Mother    • COPD Father    • Hypertension Father        Meds/Allergies   PTA meds:   Prior to Admission Medications   Prescriptions Last Dose Informant Patient Reported? Taking? Blood Glucose Monitoring Suppl (ONE TOUCH ULTRA MINI) w/Device KIT 11/23/2022  Yes Yes   Sig: Use as directed   Breo Ellipta 200-25 MCG/INH inhaler 11/23/2022  No Yes   Sig: INHALE 1 PUFF DAILY RINSE MOUTH AFTER USE     ONETOUCH DELICA LANCETS FINE MISC   Yes No   Sig: 3 (three) times a day Test albuterol (Ventolin HFA) 90 mcg/act inhaler   No No   Sig: Inhale 2 puffs every 4 (four) hours as needed for wheezing   Patient not taking: Reported on 11/23/2022   famotidine (PEPCID) 20 mg tablet   No No   Sig: Take 1 tablet (20 mg total) by mouth 2 (two) times a day   ferrous sulfate 324 (65 Fe) mg   No No   Sig: Take 1 tablet (324 mg total) by mouth 2 (two) times a day before meals   folic acid (FOLVITE) 1 mg tablet 11/22/2022  No Yes   Sig: Take 1 tablet (1,000 mcg total) by mouth daily   gabapentin (NEURONTIN) 300 mg capsule 11/22/2022  No Yes   Sig: TAKE ONE CAPSULE THREE TIMES DAILY   lisinopril (ZESTRIL) 10 mg tablet 11/22/2022  No Yes   Sig: Take 0 5 tablets (5 mg total) by mouth daily   magnesium Oxide (MAG-OX) 400 mg TABS 11/22/2022  No Yes   Sig: Take 1 tablet (400 mg total) by mouth 2 (two) times a day   metFORMIN (GLUCOPHAGE) 1000 MG tablet 11/22/2022  No Yes   Sig: Take 1 tablet (1,000 mg total) by mouth daily with breakfast   metoprolol tartrate (LOPRESSOR) 25 mg tablet 11/22/2022  No Yes   Sig: Take 0 5 tablets (12 5 mg total) by mouth every 12 (twelve) hours   omeprazole (PriLOSEC) 20 mg delayed release capsule 11/22/2022  No Yes   Sig: Take 1 capsule (20 mg total) by mouth daily   pravastatin (PRAVACHOL) 40 mg tablet 11/22/2022  No Yes   Sig: Take 1 tablet (40 mg total) by mouth daily with dinner   prednisoLONE acetate (PRED FORTE) 1 % ophthalmic suspension Not Taking  Yes No   Patient not taking: Reported on 11/23/2022   ranolazine (RANEXA) 500 mg 12 hr tablet 11/22/2022  No Yes   Sig: Take 1 tablet (500 mg total) by mouth every 12 (twelve) hours   tamsulosin (FLOMAX) 0 4 mg 11/22/2022  No Yes   Sig: TAKE 1 CAPSULE DAILY   thiamine 100 MG tablet 11/22/2022  No Yes   Sig: Take 1 tablet (100 mg total) by mouth daily      Facility-Administered Medications: None     No Known Allergies    Objective   Vitals: Blood pressure 159/83, pulse 90, temperature 98 8 °F (37 1 °C), resp   rate 18, SpO2 92 %     No intake or output data in the 24 hours ending 11/23/22 1530    Invasive Devices     Peripheral Intravenous Line  Duration           Peripheral IV 11/23/22 Right Antecubital <1 day                Physical Exam  Constitutional:       Appearance: He is normal weight  HENT:      Head: Normocephalic and atraumatic  Right Ear: External ear normal       Left Ear: External ear normal       Nose: Nose normal       Mouth/Throat:      Mouth: Mucous membranes are moist       Pharynx: Oropharynx is clear  Eyes:      Extraocular Movements: Extraocular movements intact  Conjunctiva/sclera: Conjunctivae normal       Pupils: Pupils are equal, round, and reactive to light  Cardiovascular:      Rate and Rhythm: Normal rate and regular rhythm  Pulses: Normal pulses  Heart sounds: Normal heart sounds  Pulmonary:      Effort: Pulmonary effort is normal       Breath sounds: Normal breath sounds  Abdominal:      General: Abdomen is flat  Bowel sounds are normal  There is no distension  Palpations: There is no mass  Tenderness: There is abdominal tenderness (chronic for months comes and goes)  There is no guarding or rebound  Hernia: No hernia is present  Musculoskeletal:         General: Normal range of motion  Skin:     General: Skin is warm  Capillary Refill: Capillary refill takes less than 2 seconds  Neurological:      General: No focal deficit present  Mental Status: He is alert and oriented to person, place, and time  Mental status is at baseline  Psychiatric:         Mood and Affect: Mood normal          Behavior: Behavior normal          Thought Content: Thought content normal          Judgment: Judgment normal          Lab Results: I have personally reviewed pertinent reports  Imaging: I have personally reviewed pertinent reports  EKG, Pathology, and Other Studies: I have personally reviewed pertinent reports        Code Status: Level 1 - Full Code  Advance Directive and Living Will:      Power of :    POLST:      Counseling / Coordination of Care  Total floor / unit time spent today 45 minutes  Greater than 50% of total time was spent with the patient and / or family counseling and / or coordination of care

## 2022-11-23 NOTE — ASSESSMENT & PLAN NOTE
Troponin level elevated at 59>56>55  Admission on 04/09/2022 showed an troponin level 97  Denies chest pain, nausea, vomiting, diaphoresis    48 hour telemetry  Aspirin 81 mg

## 2022-11-23 NOTE — ASSESSMENT & PLAN NOTE
History of CAD status post CABG 2004, PCI to  2014  Was not a candidate for Eliquis secondary to extensive high chronic alcohol use, history of all, there was anemia, and increased risk of bleeding  Continue home medications: Ranexa 12 mg b i d  Discontinued: Lopressor 12 5 mg b i d    Added carvedilol 6 25 mg b i d , aspirin 81 mg

## 2022-11-23 NOTE — CONSULTS
Consultation - Cardiology   Gabe Cifuentes 63 y o  male MRN: [id number]  Unit/Bed#: [address] Encounter: [id number]    Assessment/Plan     Assessment:  1  Hypertensive urgency  2  Coronary artery disease with history of CABG  3  Chronic angina  4  Diabetes  5  Proteinuria secondary #1  6  COPD/tobacco abuse  7  Alcohol use      Plan:  Patient has been admitted to the service of [facility]  1  Will obtain 2D echocardiogram to evaluate cardiac function, structure wall motion    2  Discontinue Lopressor and change patient to Coreg 6 25 mg b i d  To start and titrate as needed  This hopefully should control his blood pressure better than Lopressor  3  Increase patient's lisinopril to 20 mg daily secondary to hypertension and proteinuria    4  Plan for [facility] nuclear stress test on 2022 due to history of CABG and complaints of change in ability to perform activity, shortness of breath and intermittent chest heaviness  5  Continue Ranexa 500 mg b i d     6  Encourage both smoking and alcohol cessation    7  Continue Pravachol  8  Start low-dose aspirin 81 mg daily  History of Present Illness   Physician Requesting Consult: Laura Kenny DO  Reason for Consult / Principal Problem:  Hypertension, proteinuria      HPI: Gabe Cifuentes is a 63y o  year old male who initially presented at our office today for surgical clearance for impending bilateral cataract removal   At the office, patient was noted to have poorly managed blood pressure with blood pressure of 224/105  [facility] further [facility] to have low blood pressure and at that time his beta-blocker and ACE-inhibitor dose was cut in half  He feels since that time he has noted lightheadedness, dizziness, easy fatigability and shortness of breath with any activity    He feels that his ability to perform activities has drastically decreased over the last few months  He also notes intermittent chest heaviness with physical activity  Patient has a history for CABG x3 in 2004 performed at [facility]   He also had PCI of the left main in 2014  Patient notes hypertension, diabetes, diastolic dysfunction, COPD with history of tobacco abuse which he smokes 1-1/2 packs of cigarettes per day  Patient does note longstanding history of alcohol abuse, but he states he is currently only drinking a few beers or 2 or 3 "airplane bottles” of whiskey per day  He states he has been attending [place] [id number] meetings with a friend  He also verbalizes that he would like to quit smoking  He states he used a combination of nicotine patch and Chantix in the past, unfortunately Chantix is no longer available  Did speak to him regarding Wellbutrin and he states he did try that but did not seem to help him  Patient was previously followed by Dr Virgen Mendez, but notes he has not seen him for at least for 5 years  He has been receiving his medications through [facility]  Inpatient consult to Cardiology  Consult performed by: CHELSEA Page  Consult ordered by: Nicol Chavarria DO          Review of Systems   Constitutional: Positive for activity change and fatigue  Negative for appetite change and fever  HENT: Negative  Negative for congestion, ear discharge, hearing loss, rhinorrhea, sore throat and trouble swallowing  Eyes: Negative  Negative for photophobia and visual disturbance  Respiratory: Positive for chest tightness and shortness of breath  Cardiovascular: Negative  Negative for chest pain, palpitations and leg swelling  Gastrointestinal: Negative  Negative for abdominal distention, diarrhea, nausea and vomiting  Endocrine: Negative  Negative for polydipsia, polyphagia and polyuria  Genitourinary: Negative  Negative for difficulty urinating  Musculoskeletal: Negative  Skin: Negative  Neurological: Positive for dizziness and light-headedness  Negative for syncope and weakness  Hematological: Negative  Psychiatric/Behavioral: Negative  Historical Information   Past Medical History:   Diagnosis Date   • Acute on chronic kidney failure ([facility] )    • Alcohol withdrawal ([place] [address] ) 6/7/2019   • Cancer ([place] [address] )     prostate ca,had radiation   • Cardiac disease     stents,then triple bypass   • COPD (chronic obstructive pulmonary disease) ([place] [address] )    • ETOH abuse    • Hx of heart artery stent     2014   • Hyperlipidemia    • Hypertension    • Hypovolemic shock ([place] [address] ) 12/22/2019   [facility] 10/13/2022   • Nasal bone fracture 10/10/2022   • Prostate CA ([place] [address] )    • S/P CABG x 1     2004     Past Surgical History:   Procedure Laterality Date   • CORONARY ARTERY BYPASS GRAFT  2004   • AL ARTHRODESIS ANT INTERBODY MIN DISCECTOMY, CERVICAL BELOW C2 N/A 12/16/2020    Procedure: Anterior cervical discectomy with fusion C4-C7;  Posterior cervical decompression and fusion C2-T2;  Surgeon: Berna Billy MD;  Location: [facility];  Service: Neurosurgery   • TONSILLECTOMY       Social History     Substance and Sexual Activity   Alcohol Use Yes   • Alcohol/week: 4 0 standard drinks   • Types: 4 Standard drinks or equivalent per week    Comment: yesterday     Social History     Substance and Sexual Activity   Drug Use No     E-Cigarette/Vaping   • E-Cigarette Use Never User      E-Cigarette/Vaping Substances   • Nicotine Yes    • THC No    • CBD No    • Flavoring No    • Other No    • Unknown No      Social History     Tobacco Use   Smoking Status Every Day   • Packs/day: 1 50   • Years: 40 00   • Pack years: 60 00   • Types: Cigarettes   Smokeless Tobacco Never     Family History:   Family History   Problem Relation Age of Onset   • Diabetes Mother    • Uterine cancer Mother    • COPD Father    • Hypertension Father        Meds/Allergies   all current active meds have been reviewed, current meds:   Current Facility-Administered Medications   Medication Dose Route Frequency   • albuterol (PROVENTIL HFA,VENTOLIN HFA) inhaler 2 puff  2 puff Inhalation Q4H PRN   • carvedilol (COREG) tablet 6 25 mg  6 25 mg Oral BID With Meals   • enoxaparin (LOVENOX) subcutaneous injection 40 mg  40 mg Subcutaneous Daily   • famotidine (PEPCID) tablet 20 mg  20 mg Oral BID   • ferrous sulfate tablet 325 mg  325 mg Oral BID With Meals   • fluticasone-vilanterol 200-25 mcg/actuation 1 puff  1 puff Inhalation Daily   • folic acid (FOLVITE) tablet 1,000 mcg  1,000 mcg Oral Daily   • gabapentin (NEURONTIN) capsule 300 mg  300 mg Oral TID   • [START ON 11/24/2022] lisinopril (ZESTRIL) tablet 20 mg  20 mg Oral Daily   • magnesium oxide (MAG-OX) tablet 400 mg  400 mg Oral BID   • nicotine (NICODERM CQ) 21 mg/24 hr TD 24 hr patch 1 patch  1 patch Transdermal Daily   • [START ON 11/24/2022] pantoprazole (PROTONIX) EC tablet 40 mg  40 mg Oral Early Morning   • pravastatin (PRAVACHOL) tablet 40 mg  40 mg Oral Daily With Dinner   • ranolazine (RANEXA) 12 hr tablet 500 mg  500 mg Oral Q12H   • tamsulosin (FLOMAX) capsule 0 4 mg  0 4 mg Oral Daily   • thiamine tablet 100 mg  100 mg Oral Daily    and PTA meds:   Prior to Admission Medications   Prescriptions Last Dose Informant Patient Reported? Taking? Blood Glucose Monitoring Suppl (ONE TOUCH ULTRA MINI) w/Device KIT 11/23/2022  Yes Yes   Sig: Use as directed   Breo Ellipta 200-25 MCG/INH inhaler 11/23/2022  No Yes   Sig: INHALE 1 PUFF DAILY RINSE MOUTH AFTER USE     ONETOUCH DELICA LANCETS FINE MISC   Yes No   Sig: 3 (three) times a day Test   albuterol (Ventolin HFA) 90 mcg/act inhaler   No No   Sig: Inhale 2 puffs every 4 (four) hours as needed for wheezing   Patient not taking: Reported on 11/23/2022   famotidine (PEPCID) 20 mg tablet   No No   Sig: Take 1 tablet (20 mg total) by mouth 2 (two) times a day   ferrous sulfate 324 (65 Fe) mg   No No   Sig: Take 1 tablet (324 mg total) by mouth 2 (two) times a day before meals   folic acid (FOLVITE) 1 mg tablet 11/22/2022  No Yes   Sig: Take 1 tablet (1,000 mcg total) by mouth daily   gabapentin (NEURONTIN) 300 mg capsule 11/22/2022  No Yes   Sig: TAKE ONE CAPSULE THREE TIMES DAILY   lisinopril (ZESTRIL) 10 mg tablet 11/22/2022  No Yes   Sig: Take 0 5 tablets (5 mg total) by mouth daily   magnesium Oxide (MAG-OX) 400 mg TABS 11/22/2022  No Yes   Sig: Take 1 tablet (400 mg total) by mouth 2 (two) times a day   metFORMIN (GLUCOPHAGE) 1000 MG tablet 11/22/2022  No Yes   Sig: Take 1 tablet (1,000 mg total) by mouth daily with breakfast   metoprolol tartrate (LOPRESSOR) 25 mg tablet 11/22/2022  No Yes   Sig: Take 0 5 tablets (12 5 mg total) by mouth every 12 (twelve) hours   omeprazole (PriLOSEC) 20 mg delayed release capsule 11/22/2022  No Yes   Sig: Take 1 capsule (20 mg total) by mouth daily   pravastatin (PRAVACHOL) 40 mg tablet 11/22/2022  No Yes   Sig: Take 1 tablet (40 mg total) by mouth daily with dinner   prednisoLONE acetate (PRED FORTE) 1 % ophthalmic suspension Not Taking  Yes No   Patient not taking: Reported on 11/23/2022   ranolazine (RANEXA) 500 mg 12 hr tablet 11/22/2022  No Yes   Sig: Take 1 tablet (500 mg total) by mouth every 12 (twelve) hours   tamsulosin (FLOMAX) 0 4 mg 11/22/2022  No Yes   Sig: TAKE 1 CAPSULE DAILY   thiamine 100 MG tablet 11/22/2022  No Yes   Sig: Take 1 tablet (100 mg total) by mouth daily      Facility-Administered Medications: None     No Known Allergies    Objective   Vitals: Blood pressure 159/83, pulse 90, temperature 98 8 °F (37 1 °C), resp  rate 18, SpO2 92 %    Orthostatic Blood Pressures    Flowsheet Row Most Recent Value   Blood Pressure 159/83 filed at 11/23/2022 1307   Patient Position - Orthostatic VS Lying filed at 11/23/2022 1245          No intake or output data in the 24 hours ending 11/23/22 1415    Invasive Devices     Peripheral Intravenous Line  Duration           Peripheral IV 11/23/22 Right Antecubital <1 day                Physical Exam  Vitals and nursing note reviewed  Constitutional:       General: He is not in acute distress  Appearance: Normal appearance  He is obese  HENT:      Right Ear: External ear normal       Left Ear: External ear normal       Nose: Nose normal    Eyes:      General: No scleral icterus  Right eye: No discharge  Left eye: No discharge  Cardiovascular:      Rate and Rhythm: Normal rate and regular rhythm  Pulses: Normal pulses  Heart sounds: Normal heart sounds  Pulmonary:      Effort: Pulmonary effort is normal  No respiratory distress  Breath sounds: Examination of the right-lower field reveals decreased breath sounds  Examination of the left-lower field reveals decreased breath sounds  Decreased breath sounds present  Comments: Coarse breath sounds  Abdominal:      General: Bowel sounds are normal  There is no distension  Palpations: Abdomen is soft  Musculoskeletal:         General: Normal range of motion  Right lower leg: No edema  Left lower leg: No edema  Skin:     General: Skin is warm and dry  Capillary Refill: Capillary refill takes less than 2 seconds  Neurological:      General: No focal deficit present  Mental Status: He is alert and oriented to person, place, and time  Mental status is at baseline  Psychiatric:         Mood and Affect: Mood normal          Lab Results:   I have personally reviewed pertinent lab results      CBC with diff:   Results from last 7 days   Lab Units 11/23/22  1137   WBC Thousand/uL 6 03   RBC Million/uL 3 85*   HEMOGLOBIN g/dL 12 8   HEMATOCRIT % 37 9   MCV fL 98   MCH pg 33 2   MCHC g/dL 33 8   RDW % 13 2   MPV fL 9 7   PLATELETS Thousands/uL 184     CMP:   Results from last 7 days   Lab Units 11/22/22  0952   SODIUM mmol/L 134*   CHLORIDE mmol/L 102   CO2 mmol/L 27   BUN mg/dL 22   CREATININE mg/dL 1 12   CALCIUM mg/dL 9 9   AST U/L 49*   ALT U/L 36   ALK PHOS U/L 66 EGFR ml/min/1 73sq m 71     HS Troponin:   0   Lab Value Date/Time    HSTNI0 59 (H) 11/23/2022 1137    HSTNI2 49 10/18/2022 1105    HSTNI4 50 (H) 10/18/2022 1241    HSTNI4 97 (H) 04/10/2022 0234     BNP:   Results from last 7 days   Lab Units 11/22/22  0952   POTASSIUM mmol/L 4 8   CHLORIDE mmol/L 102   CO2 mmol/L 27   BUN mg/dL 22   CREATININE mg/dL 1 12   CALCIUM mg/dL 9 9   EGFR ml/min/1 73sq m 71     Magnesium:   Results from last 7 days   Lab Units 11/22/22  0952   MAGNESIUM mg/dL 2 1     Lipid Profile:     Imaging: I have personally reviewed pertinent reports      EKG: Sinus Rhythm  VTE Prophylaxis: Sequential compression device Luan Mustafa)     Code Status: Level 1 - Full Code  Advance Directive and Living Will:      Power of :    POLST:      Gary  Cardiology

## 2022-11-24 LAB
ANION GAP SERPL CALCULATED.3IONS-SCNC: 8 MMOL/L (ref 4–13)
BASOPHILS # BLD AUTO: 0.09 THOUSANDS/ÂΜL (ref 0–0.1)
BASOPHILS NFR BLD AUTO: 2 % (ref 0–1)
BUN SERPL-MCNC: 25 MG/DL (ref 5–25)
CALCIUM SERPL-MCNC: 9.5 MG/DL (ref 8.3–10.1)
CHLORIDE SERPL-SCNC: 97 MMOL/L (ref 96–108)
CO2 SERPL-SCNC: 28 MMOL/L (ref 21–32)
CREAT SERPL-MCNC: 1.1 MG/DL (ref 0.6–1.3)
EOSINOPHIL # BLD AUTO: 0.22 THOUSAND/ÂΜL (ref 0–0.61)
EOSINOPHIL NFR BLD AUTO: 4 % (ref 0–6)
ERYTHROCYTE [DISTWIDTH] IN BLOOD BY AUTOMATED COUNT: 13 % (ref 11.6–15.1)
GFR SERPL CREATININE-BSD FRML MDRD: 72 ML/MIN/1.73SQ M
GLUCOSE SERPL-MCNC: 106 MG/DL (ref 65–140)
GLUCOSE SERPL-MCNC: 110 MG/DL (ref 65–140)
GLUCOSE SERPL-MCNC: 119 MG/DL (ref 65–140)
GLUCOSE SERPL-MCNC: 122 MG/DL (ref 65–140)
GLUCOSE SERPL-MCNC: 95 MG/DL (ref 65–140)
HCT VFR BLD AUTO: 39.5 % (ref 36.5–49.3)
HGB BLD-MCNC: 13.2 G/DL (ref 12–17)
IMM GRANULOCYTES # BLD AUTO: 0.02 THOUSAND/UL (ref 0–0.2)
IMM GRANULOCYTES NFR BLD AUTO: 0 % (ref 0–2)
LYMPHOCYTES # BLD AUTO: 1.52 THOUSANDS/ÂΜL (ref 0.6–4.47)
LYMPHOCYTES NFR BLD AUTO: 27 % (ref 14–44)
MAGNESIUM SERPL-MCNC: 1.9 MG/DL (ref 1.6–2.6)
MCH RBC QN AUTO: 33.2 PG (ref 26.8–34.3)
MCHC RBC AUTO-ENTMCNC: 33.4 G/DL (ref 31.4–37.4)
MCV RBC AUTO: 99 FL (ref 82–98)
MONOCYTES # BLD AUTO: 0.85 THOUSAND/ÂΜL (ref 0.17–1.22)
MONOCYTES NFR BLD AUTO: 15 % (ref 4–12)
NEUTROPHILS # BLD AUTO: 2.91 THOUSANDS/ÂΜL (ref 1.85–7.62)
NEUTS SEG NFR BLD AUTO: 52 % (ref 43–75)
NRBC BLD AUTO-RTO: 0 /100 WBCS
PHOSPHATE SERPL-MCNC: 4.7 MG/DL (ref 2.3–4.1)
PLATELET # BLD AUTO: 188 THOUSANDS/UL (ref 149–390)
PMV BLD AUTO: 9.9 FL (ref 8.9–12.7)
POTASSIUM SERPL-SCNC: 4.4 MMOL/L (ref 3.5–5.3)
RBC # BLD AUTO: 3.98 MILLION/UL (ref 3.88–5.62)
SODIUM SERPL-SCNC: 133 MMOL/L (ref 135–147)
WBC # BLD AUTO: 5.61 THOUSAND/UL (ref 4.31–10.16)

## 2022-11-24 RX ORDER — LANOLIN ALCOHOL/MO/W.PET/CERES
6 CREAM (GRAM) TOPICAL
Status: DISCONTINUED | OUTPATIENT
Start: 2022-11-24 | End: 2022-11-25 | Stop reason: HOSPADM

## 2022-11-24 RX ORDER — ACETAMINOPHEN 325 MG/1
650 TABLET ORAL EVERY 6 HOURS PRN
Status: DISCONTINUED | OUTPATIENT
Start: 2022-11-24 | End: 2022-11-25 | Stop reason: HOSPADM

## 2022-11-24 RX ORDER — LORAZEPAM 1 MG/1
2 TABLET ORAL ONCE
Status: COMPLETED | OUTPATIENT
Start: 2022-11-24 | End: 2022-11-24

## 2022-11-24 RX ORDER — CARVEDILOL 6.25 MG/1
6.25 TABLET ORAL 2 TIMES DAILY WITH MEALS
Status: COMPLETED | OUTPATIENT
Start: 2022-11-24 | End: 2022-11-24

## 2022-11-24 RX ADMIN — FOLIC ACID 1000 MCG: 1 TABLET ORAL at 08:03

## 2022-11-24 RX ADMIN — RANOLAZINE 500 MG: 500 TABLET, EXTENDED RELEASE ORAL at 01:56

## 2022-11-24 RX ADMIN — THIAMINE HCL TAB 100 MG 100 MG: 100 TAB at 08:02

## 2022-11-24 RX ADMIN — TAMSULOSIN HYDROCHLORIDE 0.4 MG: 0.4 CAPSULE ORAL at 08:03

## 2022-11-24 RX ADMIN — CARVEDILOL 6.25 MG: 6.25 TABLET, FILM COATED ORAL at 07:56

## 2022-11-24 RX ADMIN — GABAPENTIN 300 MG: 300 CAPSULE ORAL at 08:03

## 2022-11-24 RX ADMIN — ACETAMINOPHEN 650 MG: 325 TABLET, FILM COATED ORAL at 21:28

## 2022-11-24 RX ADMIN — PANTOPRAZOLE SODIUM 40 MG: 40 TABLET, DELAYED RELEASE ORAL at 05:02

## 2022-11-24 RX ADMIN — RANOLAZINE 500 MG: 500 TABLET, EXTENDED RELEASE ORAL at 13:58

## 2022-11-24 RX ADMIN — GABAPENTIN 300 MG: 300 CAPSULE ORAL at 18:06

## 2022-11-24 RX ADMIN — FERROUS SULFATE TAB 325 MG (65 MG ELEMENTAL FE) 325 MG: 325 (65 FE) TAB at 18:06

## 2022-11-24 RX ADMIN — CARVEDILOL 6.25 MG: 6.25 TABLET, FILM COATED ORAL at 18:06

## 2022-11-24 RX ADMIN — FERROUS SULFATE TAB 325 MG (65 MG ELEMENTAL FE) 325 MG: 325 (65 FE) TAB at 07:56

## 2022-11-24 RX ADMIN — FAMOTIDINE 20 MG: 20 TABLET ORAL at 08:03

## 2022-11-24 RX ADMIN — NICOTINE 1 PATCH: 21 PATCH, EXTENDED RELEASE TRANSDERMAL at 08:03

## 2022-11-24 RX ADMIN — LISINOPRIL 20 MG: 20 TABLET ORAL at 08:02

## 2022-11-24 RX ADMIN — GABAPENTIN 300 MG: 300 CAPSULE ORAL at 21:16

## 2022-11-24 RX ADMIN — ENOXAPARIN SODIUM 40 MG: 40 INJECTION SUBCUTANEOUS at 08:03

## 2022-11-24 RX ADMIN — MAGNESIUM OXIDE TAB 400 MG (241.3 MG ELEMENTAL MG) 400 MG: 400 (241.3 MG) TAB at 08:02

## 2022-11-24 RX ADMIN — FAMOTIDINE 20 MG: 20 TABLET ORAL at 18:06

## 2022-11-24 RX ADMIN — ASPIRIN 81 MG: 81 TABLET, COATED ORAL at 08:02

## 2022-11-24 RX ADMIN — FLUTICASONE FUROATE AND VILANTEROL TRIFENATATE 1 PUFF: 200; 25 POWDER RESPIRATORY (INHALATION) at 08:03

## 2022-11-24 RX ADMIN — PRAVASTATIN SODIUM 40 MG: 40 TABLET ORAL at 18:06

## 2022-11-24 RX ADMIN — LORAZEPAM 2 MG: 1 TABLET ORAL at 13:58

## 2022-11-24 RX ADMIN — MAGNESIUM OXIDE TAB 400 MG (241.3 MG ELEMENTAL MG) 400 MG: 400 (241.3 MG) TAB at 18:05

## 2022-11-24 RX ADMIN — Medication 6 MG: at 23:00

## 2022-11-24 NOTE — PLAN OF CARE
Problem: INFECTION - ADULT  Goal: Absence or prevention of progression during hospitalization  Description: INTERVENTIONS:  - Assess and monitor for signs and symptoms of infection  - Monitor lab/diagnostic results  - Monitor all insertion sites, i e  indwelling lines, tubes, and drains  - Monitor endotracheal if appropriate and nasal secretions for changes in amount and color  - [place] appropriate cooling/warming therapies per order  - Administer medications as ordered  - Instruct and encourage patient and family to use good hand hygiene technique  - Identify and instruct in appropriate isolation precautions for identified infection/condition  Outcome: Progressing     Problem: SAFETY ADULT  Goal: Maintain or return to baseline ADL function  Description: INTERVENTIONS:  -  Assess patient's ability to carry out ADLs; assess patient's baseline for ADL function and identify physical deficits which impact ability to perform ADLs (bathing, care of mouth/teeth, toileting, grooming, dressing, etc )  - Assess/evaluate cause of self-care deficits   - Assess range of motion  - Assess patient's mobility; develop plan if impaired  - Assess patient's need for assistive devices and provide as appropriate  - Encourage maximum independence but intervene and supervise when necessary  - Involve family in performance of ADLs  - Assess for home care needs following discharge   - Consider OT consult to assist with ADL evaluation and planning for discharge  - Provide patient education as appropriate  Outcome: Progressing     Problem: DISCHARGE PLANNING  Goal: Discharge to home or other facility with appropriate resources  Description: INTERVENTIONS:  - Identify barriers to discharge w/patient and caregiver  - Arrange for needed discharge resources and transportation as appropriate  - Identify discharge learning needs (meds, wound care, etc )  - Arrange for interpretive services to assist at discharge as needed  - Refer to Case Management Department for coordinating discharge planning if the patient needs post-hospital services based on physician/advanced practitioner order or complex needs related to functional status, cognitive ability, or social support system  Outcome: Progressing

## 2022-11-24 NOTE — PROGRESS NOTES
Daily Progress Note - [facility] SPECIALTY [facility] Medicine Residency  Ladarius Ochoa 63 y o  male MRN: [id number]  Unit/Bed#: [address]- Encounter: [id number]  Admitting Physician: Germaine Kinsey DO   PCP: Hamlet Gutierrez MD  Date of Admission:  11/23/2022 11:22 AM    Assessment and Plan    * Hypertensive urgency  Assessment & Plan  51-year-old male with PMH of CAD s/p CABG presented with worsening SOB and chest tightness, and patient feels hypertension could be related to recent change in BP medications  He was sent in from Dr Ashley Villatoro office for hypertensive urgency  He saw Dr Jaycee Cabrera in office to be cleared for cataract surgery on 11/28  · ED course: BP elevated at 224/105, was given labetalol 10 mg which brought down the BP to 159/83  Patient complained of chest pain, and found to have elevated troponin (peak 59)  · Echo in 2020 showed moderate concentric LVH with EF of 55-60%   · UDS: normal  · EKG: NSR, possible left atrial enlargement, similar to previous EKGs in the past  · Troponin: 59>56>55  · CXR:  No acute cardiopulmonary disease    Plan:  · 48 hour telemetry  · Secondary HTN workup:  [facility] AS renal scan, renin aldosterone ratio, plasma renin and aldosterone levels  · Echo and Lexiscan nuclear stress test on Friday  · Cardiology consulted, appreciate recs  · Increased lisinopril to 20 mg daily  · carvedilol 6 25 mg b i d  (holding 11/25 AM prior to [facility])  · aspirin 81 mg daily  · Discontinued Lopressor from home med    Elevated troponin  Assessment & Plan  Troponin on admission was 59>56>55  Troponin on 04/09/2022 was 97    48 hour telemetry    CAD (coronary artery disease)  Assessment & Plan  History of CAD status post CABG 2004, PCI to  2014  Was not a candidate for Eliquis secondary to extensive high chronic alcohol use, history of all, there was anemia, and increased risk of bleeding  Continue home medications: Ranexa 12 mg b i d  Discontinued: Lopressor 12 5 mg b i d    Added carvedilol 6 25 mg b i d , aspirin 81 mg    COPD (chronic obstructive pulmonary disease) (HCC)  Assessment & Plan  Chronic, stable  Continue home inhalers Breo Ellipta and albuterol    Type 2 diabetes mellitus [facility])  Assessment & Plan  Lab Results   Component Value Date    HGBA1C 5 1 10/17/2022       Recent Labs     11/23/22  2126 11/24/22  0730   POCGLU 112 122       Blood Sugar Average: Last 72 hrs:  (P) 117     Home metformin held  On ISS     Essential hypertension  Assessment & Plan  Chronic, stable    Home meds:  Increased Lisinopril 5 mg to lisinopril 20 mg,   Discontinued Lopressor 12 5 mg b i d  Added carvedilol 6 25 mg BID, aspirin 81 mg    Hx of CABG  Assessment & Plan  Troponin level elevated at 59>56>55  Admission on 04/09/2022 showed an troponin level 97  Denies chest pain, nausea, vomiting, diaphoresis    48 hour telemetry  Aspirin 81 mg    Alcohol abuse  Assessment & Plan  Chronic  Continue thiamine and folic acid  On CIWA  UDS neg    Nicotine abuse  Assessment & Plan  Continue nicotine patch  Motivated to quit  Start Chantix on discharge    Dyslipidemia  Assessment & Plan  Continue home medication:  Statin      VTE Pharmacologic Prophylaxis:   Pharmacologic: Enoxaparin (Lovenox)  Mechanical VTE Prophylaxis in Place: Yes    Patient Centered Rounds: I have performed bedside rounds with nursing staff today  Discussions with Specialists or Other Care Team Provider: Cardiology- Dr Tolentino Pulse    Education and Discussions with Family     Time Spent for Care: 20 minutes  More than 50% of total time spent on counseling and coordination of care as described above      Current Length of Stay: 1 day(s)    Current Patient Status: Inpatient   Certification Statement: The patient will continue to require additional inpatient hospital stay due to Echo and [facility]    Discharge Plan: Pending Echo and nuclear stress test    Code Status: Level 1 - Full Code    Subjective:     Patient was seen and examined by bedside today  Breathing comfortably on room air  Reported lower abd pain about 3/10 this morning  Last BM yesterday  Denied any chills, sweating, fever, chest pain, headaches  Objective     Objective:   Vitals:   Temp (24hrs), Av 5 °F (36 9 °C), Min:97 6 °F (36 4 °C), Max:99 8 °F (37 7 °C)    Temp:  [97 6 °F (36 4 °C)-99 8 °F (37 7 °C)] 97 6 °F (36 4 °C)  HR:  [] 92  Resp:  [16-20] 17  BP: (140-224)/() 194/118  SpO2:  [89 %-96 %] 93 %  Body mass index is 25 42 kg/m²  Input and Output Summary (last 24 hours): Intake/Output Summary (Last 24 hours) at 2022 1048  Last data filed at 2022 1000  Gross per 24 hour   Intake --   Output 3400 ml   Net -3400 ml       Physical Exam:   Physical Exam  Constitutional:       General: He is not in acute distress  Appearance: Normal appearance  HENT:      Head: Normocephalic and atraumatic  Mouth/Throat:      Mouth: Mucous membranes are moist    Eyes:      Pupils: Pupils are equal, round, and reactive to light  Cardiovascular:      Rate and Rhythm: Normal rate and regular rhythm  Pulses: Normal pulses  Heart sounds: Normal heart sounds  No murmur heard  Pulmonary:      Effort: Pulmonary effort is normal  No respiratory distress  Breath sounds: Normal breath sounds  No wheezing  Abdominal:      General: Bowel sounds are normal  There is no distension  Palpations: Abdomen is soft  There is no mass  Tenderness: There is abdominal tenderness (mild 3/10 on palpatin)  Musculoskeletal:      Cervical back: Normal range of motion  Right lower leg: No edema  Left lower leg: No edema  Skin:     General: Skin is warm and dry  Neurological:      General: No focal deficit present  Mental Status: He is alert and oriented to person, place, and time     Psychiatric:         Mood and Affect: Mood normal          Behavior: Behavior normal        Additional Data:     Labs:  Results from last 7 days   Lab Units 11/24/22  0455   WBC Thousand/uL 5 61   HEMOGLOBIN g/dL 13 2   HEMATOCRIT % 39 5   PLATELETS Thousands/uL 188   NEUTROS PCT % 52   LYMPHS PCT % 27   MONOS PCT % 15*   EOS PCT % 4     Results from last 7 days   Lab Units 11/24/22  0455 11/23/22  1137   POTASSIUM mmol/L 4 4 4 7   CHLORIDE mmol/L 97 97   CO2 mmol/L 28 28   BUN mg/dL 25 22   CREATININE mg/dL 1 10 1 21   CALCIUM mg/dL 9 5 9 8   ALK PHOS U/L  --  60   ALT U/L  --  29   AST U/L  --  42*         Results from last 7 days   Lab Units 11/24/22  0730 11/23/22  2126   POC GLUCOSE mg/dl 122 112           * I Have Reviewed All Lab Data Listed Above  * Additional Pertinent Lab Tests Reviewed:  [facility]      Recent Cultures (last 7 days):           Last 24 Hours Medication List:   Current Facility-Administered Medications   Medication Dose Route Frequency Provider Last Rate   • albuterol  2 puff Inhalation Q4H PRN Amedeo Ates, DO     • aspirin  81 mg Oral Daily CHELSEA Segura     • carvedilol  6 25 mg Oral BID With Meals Zion Mares MD     • enoxaparin  40 mg Subcutaneous Daily Melissa Rose, DO     • famotidine  20 mg Oral BID Amedeo Ates, DO     • ferrous sulfate  325 mg Oral BID With Meals Amedeo Ates, DO     • fluticasone-vilanterol  1 puff Inhalation Daily Melissa Rose, DO     • folic acid  9,797 mcg Oral Daily Melissa Rose, DO     • gabapentin  300 mg Oral TID Amedeo Ates, DO     • insulin lispro  1-6 Units Subcutaneous 4x Daily (AC & HS) Lois Kitchen, DO     • lisinopril  20 mg Oral Daily CHELSEA Segura     • magnesium oxide  400 mg Oral BID Amedeo Ates, DO     • nicotine  1 patch Transdermal Daily Amedeo Ates, DO     • pantoprazole  40 mg Oral Early Morning Melissagudelia Rose, DO     • pravastatin  40 mg Oral Daily With Futurederm, DO     • ranolazine  500 mg Oral Q12H Melissa Rose, DO     • tamsulosin  0 4 mg Oral Daily Melissa Rose, DO     • thiamine  100 mg Oral Daily Edd Maldonado,                ** Please Note: Dictation voice to text software may have been used in the creation of this document   **    805 Alie Ortiz MD  11/24/22  10:48 AM

## 2022-11-24 NOTE — PLAN OF CARE
Problem: PAIN - ADULT  Goal: Verbalizes/displays adequate comfort level or baseline comfort level  Description: Interventions:  - Encourage patient to monitor pain and request assistance  - Assess pain using appropriate pain scale  - Administer analgesics based on type and severity of pain and evaluate response  - Implement non-pharmacological measures as appropriate and evaluate response  - Consider cultural and social influences on pain and pain management  - Notify physician/advanced practitioner if interventions unsuccessful or patient reports new pain  Outcome: Progressing     Problem: INFECTION - ADULT  Goal: Absence or prevention of progression during hospitalization  Description: INTERVENTIONS:  - Assess and monitor for signs and symptoms of infection  - Monitor lab/diagnostic results  - Monitor all insertion sites, i e  indwelling lines, tubes, and drains  - Monitor endotracheal if appropriate and nasal secretions for changes in amount and color  - [place] appropriate cooling/warming therapies per order  - Administer medications as ordered  - Instruct and encourage patient and family to use good hand hygiene technique  - Identify and instruct in appropriate isolation precautions for identified infection/condition  Outcome: Progressing  Goal: Absence of fever/infection during neutropenic period  Description: INTERVENTIONS:  - Monitor WBC    Outcome: Progressing     Problem: SAFETY ADULT  Goal: Patient will remain free of falls  Description: INTERVENTIONS:  - Educate patient/family on patient safety including physical limitations  - Instruct patient to call for assistance with activity   - Consult OT/PT to assist with strengthening/mobility   - Keep Call bell within reach  - Keep bed low and locked with side rails adjusted as appropriate  - Keep care items and personal belongings within reach  - Initiate and maintain comfort rounds  - Make Fall Risk Sign visible to staff  - Initiate/Maintain bed alarm  - Obtain necessary fall risk management equipment: walker  - Apply yellow socks and bracelet for high fall risk patients  - Consider moving patient to room near nurses station  Outcome: Progressing  Goal: Maintain or return to baseline ADL function  Description: INTERVENTIONS:  -  Assess patient's ability to carry out ADLs; assess patient's baseline for ADL function and identify physical deficits which impact ability to perform ADLs (bathing, care of mouth/teeth, toileting, grooming, dressing, etc )  - Assess/evaluate cause of self-care deficits   - Assess range of motion  - Assess patient's mobility; develop plan if impaired  - Assess patient's need for assistive devices and provide as appropriate  - Encourage maximum independence but intervene and supervise when necessary  - Involve family in performance of ADLs  - Assess for home care needs following discharge   - Consider OT consult to assist with ADL evaluation and planning for discharge  - Provide patient education as appropriate  Outcome: Progressing  Goal: Maintains/Returns to pre admission functional level  Description: INTERVENTIONS:  - Perform BMAT or MOVE assessment daily    - Set and communicate daily mobility goal to care team and patient/family/caregiver     - Collaborate with rehabilitation services on mobility goals if consulted  - Stand patient 3 times a day  - Ambulate patient 3 times a day  - Out of bed to chair 3 times a day   - Out of bed for meals 3 times a day  - Out of bed for toileting  - Record patient progress and toleration of activity level   Outcome: Progressing     Problem: DISCHARGE PLANNING  Goal: Discharge to home or other facility with appropriate resources  Description: INTERVENTIONS:  - Identify barriers to discharge w/patient and caregiver  - Arrange for needed discharge resources and transportation as appropriate  - Identify discharge learning needs (meds, wound care, etc )  - Arrange for interpretive services to assist at discharge as needed  - Refer to Case Management Department for coordinating discharge planning if the patient needs post-hospital services based on physician/advanced practitioner order or complex needs related to functional status, cognitive ability, or social support system  Outcome: Progressing     Problem: Knowledge Deficit  Goal: Patient/family/caregiver demonstrates understanding of disease process, treatment plan, medications, and discharge instructions  Description: Complete learning assessment and assess knowledge base  Interventions:  - Provide teaching at level of understanding  - Provide teaching via preferred learning methods  Outcome: Progressing     Problem: MOBILITY - ADULT  Goal: Maintain or return to baseline ADL function  Description: INTERVENTIONS:  -  Assess patient's ability to carry out ADLs; assess patient's baseline for ADL function and identify physical deficits which impact ability to perform ADLs (bathing, care of mouth/teeth, toileting, grooming, dressing, etc )  - Assess/evaluate cause of self-care deficits   - Assess range of motion  - Assess patient's mobility; develop plan if impaired  - Assess patient's need for assistive devices and provide as appropriate  - Encourage maximum independence but intervene and supervise when necessary  - Involve family in performance of ADLs  - Assess for home care needs following discharge   - Consider OT consult to assist with ADL evaluation and planning for discharge  - Provide patient education as appropriate  Outcome: Progressing  Goal: Maintains/Returns to pre admission functional level  Description: INTERVENTIONS:  - Perform BMAT or MOVE assessment daily    - Set and communicate daily mobility goal to care team and patient/family/caregiver     - Collaborate with rehabilitation services on mobility goals if consulted  - Stand patient 3 times a day  - Ambulate patient 3 times a day  - Out of bed to chair 3 times a day   - Out of bed for meals 3 times a day  - Out of bed for toileting  - Record patient progress and toleration of activity level   Outcome: Progressing

## 2022-11-25 ENCOUNTER — APPOINTMENT (INPATIENT)
Dept: NON INVASIVE DIAGNOSTICS | Facility: HOSPITAL | Age: 62
End: 2022-11-25

## 2022-11-25 ENCOUNTER — APPOINTMENT (OUTPATIENT)
Dept: RADIOLOGY | Facility: HOSPITAL | Age: 62
End: 2022-11-25

## 2022-11-25 ENCOUNTER — APPOINTMENT (INPATIENT)
Dept: RADIOLOGY | Facility: HOSPITAL | Age: 62
End: 2022-11-25

## 2022-11-25 VITALS
RESPIRATION RATE: 16 BRPM | DIASTOLIC BLOOD PRESSURE: 97 MMHG | SYSTOLIC BLOOD PRESSURE: 159 MMHG | HEIGHT: 74 IN | WEIGHT: 205.25 LBS | HEART RATE: 78 BPM | OXYGEN SATURATION: 96 % | BODY MASS INDEX: 26.34 KG/M2 | TEMPERATURE: 98.1 F

## 2022-11-25 PROBLEM — I5A NONISCHEMIC NONTRAUMATIC MYOCARDIAL INJURY: Status: ACTIVE | Noted: 2022-04-10

## 2022-11-25 PROBLEM — I16.0 HYPERTENSIVE URGENCY: Status: RESOLVED | Noted: 2022-11-23 | Resolved: 2022-11-25

## 2022-11-25 LAB
ALBUMIN SERPL BCP-MCNC: 3.2 G/DL (ref 3.5–5)
ALP SERPL-CCNC: 57 U/L (ref 46–116)
ALT SERPL W P-5'-P-CCNC: 40 U/L (ref 12–78)
ANION GAP SERPL CALCULATED.3IONS-SCNC: 8 MMOL/L (ref 4–13)
AORTIC ROOT: 3.4 CM
APICAL FOUR CHAMBER EJECTION FRACTION: 49 %
ASCENDING AORTA: 3.6 CM
AST SERPL W P-5'-P-CCNC: 35 U/L (ref 5–45)
ATRIAL RATE: 107 BPM
ATRIAL RATE: 96 BPM
BASOPHILS # BLD AUTO: 0.1 THOUSANDS/ÂΜL (ref 0–0.1)
BASOPHILS NFR BLD AUTO: 2 % (ref 0–1)
BILIRUB SERPL-MCNC: 0.34 MG/DL (ref 0.2–1)
BUN SERPL-MCNC: 24 MG/DL (ref 5–25)
CALCIUM ALBUM COR SERPL-MCNC: 9.5 MG/DL (ref 8.3–10.1)
CALCIUM SERPL-MCNC: 8.9 MG/DL (ref 8.3–10.1)
CHEST PAIN STATEMENT: NORMAL
CHLORIDE SERPL-SCNC: 98 MMOL/L (ref 96–108)
CO2 SERPL-SCNC: 29 MMOL/L (ref 21–32)
CREAT SERPL-MCNC: 1.04 MG/DL (ref 0.6–1.3)
E WAVE DECELERATION TIME: 204 MS
EOSINOPHIL # BLD AUTO: 0.28 THOUSAND/ÂΜL (ref 0–0.61)
EOSINOPHIL NFR BLD AUTO: 5 % (ref 0–6)
ERYTHROCYTE [DISTWIDTH] IN BLOOD BY AUTOMATED COUNT: 13 % (ref 11.6–15.1)
FRACTIONAL SHORTENING: 32 (ref 28–44)
GFR SERPL CREATININE-BSD FRML MDRD: 77 ML/MIN/1.73SQ M
GLUCOSE SERPL-MCNC: 116 MG/DL (ref 65–140)
GLUCOSE SERPL-MCNC: 118 MG/DL (ref 65–140)
GLUCOSE SERPL-MCNC: 123 MG/DL (ref 65–140)
HCT VFR BLD AUTO: 38.2 % (ref 36.5–49.3)
HGB BLD-MCNC: 12.9 G/DL (ref 12–17)
IMM GRANULOCYTES # BLD AUTO: 0.02 THOUSAND/UL (ref 0–0.2)
IMM GRANULOCYTES NFR BLD AUTO: 0 % (ref 0–2)
INTERVENTRICULAR SEPTUM IN DIASTOLE (PARASTERNAL SHORT AXIS VIEW): 1.7 CM
INTERVENTRICULAR SEPTUM: 1.7 CM (ref 0.6–1.1)
LAAS-AP2: 26.9 CM2
LAAS-AP4: 30.1 CM2
LEFT ATRIUM SIZE: 4.8 CM
LEFT INTERNAL DIMENSION IN SYSTOLE: 3.6 CM (ref 2.1–4)
LEFT VENTRICLE DIASTOLIC VOLUME (MOD BIPLANE): 96 ML
LEFT VENTRICLE SYSTOLIC VOLUME (MOD BIPLANE): 43 ML
LEFT VENTRICULAR INTERNAL DIMENSION IN DIASTOLE: 5.3 CM (ref 3.5–6)
LEFT VENTRICULAR POSTERIOR WALL IN END DIASTOLE: 1.7 CM
LEFT VENTRICULAR STROKE VOLUME: 79 ML
LV EF: 55 %
LVSV (TEICH): 79 ML
LYMPHOCYTES # BLD AUTO: 1.45 THOUSANDS/ÂΜL (ref 0.6–4.47)
LYMPHOCYTES NFR BLD AUTO: 24 % (ref 14–44)
MAGNESIUM SERPL-MCNC: 1.7 MG/DL (ref 1.6–2.6)
MAX DIASTOLIC BP: 80 MMHG
MAX HEART RATE: 130 BPM
MAX HR PERCENT: 81 %
MAX HR: 130 BPM
MAX PREDICTED HEART RATE: 160 BPM
MAX. SYSTOLIC BP: 140 MMHG
MCH RBC QN AUTO: 33.2 PG (ref 26.8–34.3)
MCHC RBC AUTO-ENTMCNC: 33.8 G/DL (ref 31.4–37.4)
MCV RBC AUTO: 98 FL (ref 82–98)
MONOCYTES # BLD AUTO: 0.84 THOUSAND/ÂΜL (ref 0.17–1.22)
MONOCYTES NFR BLD AUTO: 14 % (ref 4–12)
MV E'TISSUE VEL-SEP: 6 CM/S
MV PEAK A VEL: 0.68 M/S
MV PEAK E VEL: 62 CM/S
MV STENOSIS PRESSURE HALF TIME: 59 MS
MV VALVE AREA P 1/2 METHOD: 3.73
NEUTROPHILS # BLD AUTO: 3.33 THOUSANDS/ÂΜL (ref 1.85–7.62)
NEUTS SEG NFR BLD AUTO: 55 % (ref 43–75)
NRBC BLD AUTO-RTO: 0 /100 WBCS
NUC STRESS EJECTION FRACTION: 44 %
P AXIS: 79 DEGREES
P AXIS: 79 DEGREES
PHOSPHATE SERPL-MCNC: 4.7 MG/DL (ref 2.3–4.1)
PLATELET # BLD AUTO: 192 THOUSANDS/UL (ref 149–390)
PMV BLD AUTO: 10 FL (ref 8.9–12.7)
POTASSIUM SERPL-SCNC: 4.4 MMOL/L (ref 3.5–5.3)
PR INTERVAL: 114 MS
PR INTERVAL: 128 MS
PROT SERPL-MCNC: 7.1 G/DL (ref 6.4–8.4)
PROTOCOL NAME: NORMAL
QRS AXIS: 75 DEGREES
QRS AXIS: 84 DEGREES
QRSD INTERVAL: 82 MS
QRSD INTERVAL: 90 MS
QT INTERVAL: 338 MS
QT INTERVAL: 350 MS
QTC INTERVAL: 442 MS
QTC INTERVAL: 451 MS
RATE PRESSURE PRODUCT: NORMAL
RBC # BLD AUTO: 3.89 MILLION/UL (ref 3.88–5.62)
REASON FOR TERMINATION: NORMAL
RIGHT ATRIUM AREA SYSTOLE A4C: 11.6 CM2
RIGHT VENTRICLE ID DIMENSION: 3.3 CM
SL CV LEFT ATRIUM LENGTH A2C: 5.6 CM
SL CV LV EF: 55
SL CV PED ECHO LEFT VENTRICLE DIASTOLIC VOLUME (MOD BIPLANE) 2D: 133 ML
SL CV PED ECHO LEFT VENTRICLE SYSTOLIC VOLUME (MOD BIPLANE) 2D: 55 ML
SL CV REST NUCLEAR ISOTOPE DOSE: 10.25 MCI
SL CV STRESS NUCLEAR ISOTOPE DOSE: 30.1 MCI
SL CV STRESS RECOVERY BP: NORMAL MMHG
SL CV STRESS RECOVERY HR: 108 BPM
SL CV STRESS RECOVERY O2 SAT: 99 %
SODIUM SERPL-SCNC: 135 MMOL/L (ref 135–147)
STRESS ANGINA INDEX: 0
STRESS BASELINE BP: NORMAL MMHG
STRESS BASELINE HR: 105 BPM
STRESS DUKE TREADMILL SCORE: 3
STRESS O2 SAT REST: 98 %
STRESS PEAK HR: 127 BPM
STRESS POST ESTIMATED WORKLOAD: 1.6 METS
STRESS POST EXERCISE DUR MIN: 3 MIN
STRESS POST O2 SAT PEAK: 98 %
STRESS POST PEAK BP: 120 MMHG
STRESS ST DEPRESSION: 0 MM
T WAVE AXIS: 24 DEGREES
T WAVE AXIS: 54 DEGREES
TARGET HR FORMULA: NORMAL
TEST INDICATION: NORMAL
TIME IN EXERCISE PHASE: NORMAL
VENTRICULAR RATE: 107 BPM
VENTRICULAR RATE: 96 BPM
WBC # BLD AUTO: 6.02 THOUSAND/UL (ref 4.31–10.16)

## 2022-11-25 RX ORDER — ASPIRIN 81 MG/1
81 TABLET ORAL DAILY
Refills: 0
Start: 2022-11-25

## 2022-11-25 RX ORDER — CARVEDILOL 6.25 MG/1
6.25 TABLET ORAL 2 TIMES DAILY WITH MEALS
Status: DISCONTINUED | OUTPATIENT
Start: 2022-11-25 | End: 2022-11-25 | Stop reason: HOSPADM

## 2022-11-25 RX ORDER — VARENICLINE TARTRATE 0.5 MG/1
0.5 TABLET, FILM COATED ORAL 2 TIMES DAILY
Qty: 60 TABLET | Refills: 0 | Status: SHIPPED | OUTPATIENT
Start: 2022-11-25

## 2022-11-25 RX ORDER — NICOTINE 21 MG/24HR
1 PATCH, TRANSDERMAL 24 HOURS TRANSDERMAL DAILY
Qty: 28 PATCH | Refills: 0 | Status: SHIPPED | OUTPATIENT
Start: 2022-11-26

## 2022-11-25 RX ORDER — PANTOPRAZOLE SODIUM 40 MG/1
40 TABLET, DELAYED RELEASE ORAL
Qty: 30 TABLET | Refills: 1 | Status: SHIPPED | OUTPATIENT
Start: 2022-11-26

## 2022-11-25 RX ORDER — CARVEDILOL 6.25 MG/1
6.25 TABLET ORAL 2 TIMES DAILY WITH MEALS
Qty: 180 TABLET | Refills: 1 | Status: SHIPPED | OUTPATIENT
Start: 2022-11-25

## 2022-11-25 RX ORDER — LISINOPRIL 20 MG/1
20 TABLET ORAL DAILY
Qty: 90 TABLET | Refills: 1 | Status: SHIPPED | OUTPATIENT
Start: 2022-11-25

## 2022-11-25 RX ORDER — RANOLAZINE 500 MG/1
500 TABLET, EXTENDED RELEASE ORAL EVERY 12 HOURS
Status: DISCONTINUED | OUTPATIENT
Start: 2022-11-25 | End: 2022-11-25 | Stop reason: HOSPADM

## 2022-11-25 RX ADMIN — FOLIC ACID 1000 MCG: 1 TABLET ORAL at 12:10

## 2022-11-25 RX ADMIN — CARVEDILOL 6.25 MG: 6.25 TABLET, FILM COATED ORAL at 12:10

## 2022-11-25 RX ADMIN — NICOTINE 1 PATCH: 21 PATCH, EXTENDED RELEASE TRANSDERMAL at 08:25

## 2022-11-25 RX ADMIN — ASPIRIN 81 MG: 81 TABLET, COATED ORAL at 12:10

## 2022-11-25 RX ADMIN — PANTOPRAZOLE SODIUM 40 MG: 40 TABLET, DELAYED RELEASE ORAL at 05:04

## 2022-11-25 RX ADMIN — FERROUS SULFATE TAB 325 MG (65 MG ELEMENTAL FE) 325 MG: 325 (65 FE) TAB at 12:10

## 2022-11-25 RX ADMIN — RANOLAZINE 500 MG: 500 TABLET, EXTENDED RELEASE ORAL at 02:41

## 2022-11-25 RX ADMIN — ENOXAPARIN SODIUM 40 MG: 40 INJECTION SUBCUTANEOUS at 08:25

## 2022-11-25 RX ADMIN — REGADENOSON 0.4 MG: 0.08 INJECTION, SOLUTION INTRAVENOUS at 10:10

## 2022-11-25 RX ADMIN — THIAMINE HCL TAB 100 MG 100 MG: 100 TAB at 12:10

## 2022-11-25 RX ADMIN — GABAPENTIN 300 MG: 300 CAPSULE ORAL at 12:10

## 2022-11-25 RX ADMIN — LISINOPRIL 20 MG: 20 TABLET ORAL at 12:10

## 2022-11-25 RX ADMIN — MAGNESIUM OXIDE TAB 400 MG (241.3 MG ELEMENTAL MG) 400 MG: 400 (241.3 MG) TAB at 12:10

## 2022-11-25 RX ADMIN — FAMOTIDINE 20 MG: 20 TABLET ORAL at 12:10

## 2022-11-25 RX ADMIN — FLUTICASONE FUROATE AND VILANTEROL TRIFENATATE 1 PUFF: 200; 25 POWDER RESPIRATORY (INHALATION) at 08:26

## 2022-11-25 RX ADMIN — TAMSULOSIN HYDROCHLORIDE 0.4 MG: 0.4 CAPSULE ORAL at 12:10

## 2022-11-25 NOTE — PROGRESS NOTES
Pt ate his lunch with current diet order  Pt feeling better and was willing to take medications  Awaiting discharge

## 2022-11-25 NOTE — DISCHARGE SUMMARY
Discharge Summary - [facility]     Patient Information: Margaret Booth 63 y o  male MRN: [id number]  Unit/Bed#: [address] Encounter: [id number]     Admitting Physician: Bishnu Lofton DO  Discharging Physician/Practitioner: Bishnu Lofton DO   PCP: Breanne Bravo MD  Admission Date:   Admission Orders (From admission, onward)     Ordered        11/23/22 1235  1 [facility],[address]  Once                      Discharge Date: 11/25/22      Reason for Admission: Hypertension [I10]  NSTEMI (non-ST elevated myocardial infarction) [facility]) [I21 4]     Discharge Diagnoses:      Principal Problem:    Hypertensive urgency  Active Problems:    Nonischemic nontraumatic myocardial injury    Essential hypertension    COPD (chronic obstructive pulmonary disease) ([facility] [address] )    Hx of CABG    CAD (coronary artery disease)    Type 2 diabetes mellitus ([facility] )    Dyslipidemia    Alcohol abuse    Nicotine abuse  Resolved Problems:    * No resolved hospital problems  *       Consultations During Hospital Stay:  ·       cardiology     Procedures Performed:   ·       EKG, renal artery ultrasound, echo, stress test     Significant Findings / Test Results:   11/25: Na 135, Mg 1 7 (on Mg pill)  11/24: Na 133  11/23: Troponins: 59> 56>55; Na 133, AST 42; CBC: WNL; UA:  Protein 30; TSH WNL  -UDS: Negative  -CXR: NAD     Incidental Findings:   ·       none      Test Results Pending at Discharge (will require follow up):   ·       none     Outpatient Tests Requested:  ·       none     Outpatient follow-up Requested:  ·       Cardiology, PCP    Complications: Things to address at first visit after hospitalization   Are you continuing your new hypertension medication? Have you had any side effects? Did you follow-up with Cardiology? Did he start using Chantix, and the nicotine patch? Any side effects from the Chantix? Do need any additional support for smoking cessation?     Hospital Course:      Radames Brewster Rosa Johnston is a 63 y o  male patient who originally presented to the hospital on 11/23/2022 due to hypertensive urgency  "Altagracia Zuluaga is a 63 y o  male who presents with hypertension  He was sent over cardiologist due to elevated systolic pressures greater than 200  He notes that he did not take his blood pressure today  He notes that about a month ago his blood pressure medication was cut in half due to episodes of lower blood pressure and lightheadedness  He notes that he gets chest tightness and shortness of breath on exertion  He denied any nausea, vomiting, diaphoresis, syncope, chest pain, headache, weakness, numbness, or changes in vision from his baseline (bilateral cataracts)"    Presented for hypertensive urgency and was treated accordingly with recommendations from Cardiology  Blood pressures normalized with the transition of new antihypertensives, and a complete cardiac workup including EKG, renal artery ultrasound, echo, and stress test showed normal findings as per cardio  He was cleared for cataract surgery as per cardio, EKG and CMP findings  Condition at Discharge: good      Discharge Day Visit / Exam:      Vitals: Blood Pressure: 167/98 (11/25/22 0829)  Pulse: 77 (11/25/22 0829)  Temperature: 97 8 °F (36 6 °C) (11/25/22 0829)  Temp Source: Oral (11/24/22 1910)  Respirations: 18 (11/24/22 1910)  Weight - Scale: 93 1 kg (205 lb 4 oz) (11/25/22 0534)  SpO2: 92 % (11/25/22 0829)  Exam:   Constitutional:       Appearance: He is normal weight  HENT:      Head: Normocephalic and atraumatic  Right Ear: External ear normal       Left Ear: External ear normal       Nose: Nose normal       Mouth/Throat:      Mouth: Mucous membranes are moist       Pharynx: Oropharynx is clear  Eyes:      Extraocular Movements: Extraocular movements intact  Conjunctiva/sclera: Conjunctivae normal    Cardiovascular:      Rate and Rhythm: Normal rate and regular rhythm  Pulses: Normal pulses  Heart sounds: Normal heart sounds  Pulmonary:      Effort: Pulmonary effort is normal       Breath sounds: Normal breath sounds  Abdominal:      General: Abdomen is flat  Bowel sounds are normal       Palpations: Abdomen is soft  Tenderness: There is abdominal tenderness (unchanged from admission, has had this tenderness from weeks)  Musculoskeletal:         General: Normal range of motion  Skin:     General: Skin is warm  Capillary Refill: Capillary refill takes less than 2 seconds  Neurological:      General: No focal deficit present  Mental Status: He is alert and oriented to person, place, and time  Discussion with Family:   Patient Information Sharing: With the consent of Manny Candelario, their loved ones Hakan Walker, son) were notified today by inpatient team of the patient’s condition and current plan  All questions answered  Discharge instructions/Information to patient and family:   See after visit summary for information provided to patient and family  Discharge Medications:     Medication List      START taking these medications    • aspirin 81 mg EC tablet; Commonly known as: [address]; Take 1   tablet (81 mg total) by mouth daily  • carvedilol 6 25 mg tablet; Commonly known as: COREG; Take 1 tablet (6 25   mg total) by mouth 2 (two) times a day with meals     CHANGE how you take these medications    • lisinopril 20 mg tablet; Commonly known as: ZESTRIL; Take 1 tablet (20   mg total) by mouth daily; What changed: medication strength, how much to   take     CONTINUE taking these medications    • ONE TOUCH ULTRA MINI w/Device Kit  • OneTouch Delica Lancets Fine Misc  • pravastatin 40 mg tablet; Commonly known as: PRAVACHOL; Take 1 tablet   (40 mg total) by mouth daily with dinner  • ranolazine 500 mg 12 hr tablet; Commonly known as: RANEXA;  Take 1 tablet   (500 mg total) by mouth every 12 (twelve) hours     STOP taking these medications    • metoprolol tartrate 25 mg tablet; Commonly known as: LOPRESSOR     ASK your doctor about these medications    • albuterol 90 mcg/act inhaler; Commonly known as: Ventolin HFA; Inhale 2   puffs every 4 (four) hours as needed for wheezing  • Breo Ellipta 200-25 mcg/actuation inhaler; Generic drug:   fluticasone-vilanterol; INHALE 1 PUFF DAILY RINSE MOUTH AFTER USE  • famotidine 20 mg tablet; Commonly known as: PEPCID; Take 1 tablet (20 mg   total) by mouth 2 (two) times a day  • ferrous sulfate 324 (65 Fe) mg; Take 1 tablet (324 mg total) by mouth 2   (two) times a day before meals  • folic acid 1 mg tablet; Commonly known as: Valentino Andrews; Take 1 tablet (1,000   mcg total) by mouth daily  • gabapentin 300 mg capsule; Commonly known as: NEURONTIN; TAKE ONE   CAPSULE THREE TIMES DAILY  • magnesium Oxide 400 mg Tabs; Commonly known as: MAG-OX; Take 1 tablet   (400 mg total) by mouth 2 (two) times a day  • metFORMIN 1000 MG tablet; Commonly known as: GLUCOPHAGE; Take 1 tablet   (1,000 mg total) by mouth daily with breakfast  • omeprazole 20 mg delayed release capsule; Commonly known as: PriLOSEC; Take 1 capsule (20 mg total) by mouth daily  • prednisoLONE acetate 1 % ophthalmic suspension; Commonly known as: PRED   FORTE  • tamsulosin 0 4 mg; Commonly known as: FLOMAX; TAKE 1 CAPSULE DAILY  • thiamine 100 MG tablet; Take 1 tablet (100 mg total) by mouth daily        Disposition:   Home     Discharge Statement:  I spent 30 minutes discharging the patient  This time was spent on the day of discharge  I had direct contact with the patient on the day of discharge  Greater than 50% of the total time was spent examining patient, answering all patient questions, arranging and discussing plan of care with patient as well as directly providing post-discharge instructions  Additional time then spent on discharge activities       ** Please Note: This note has been constructed using a voice recognition system **     Clare Hyman MD 11/25/22  10:19 AM

## 2022-11-25 NOTE — PROGRESS NOTES
Progress Note - Cardiology   [facility]     Teddy Tyler 63 y o  male MRN: [id number]  : 1960  Unit/Bed#: [address] [facility] 406- Encounter: [id number]    Assessment and Plan:   1  Hypertensive urgency:  Patient presented with blood pressure of 224/105    -  blood pressures have improved over the last 2 days since admission    -  Lopressor discontinued and patient transition to Coreg 6 25 mg b i d     -  lisinopril increased to 20 mg daily    -  renal artery duplex tentative read is negative    -  2D echocardiogram is pending    -  patient notes that he is feeling much better and tolerating medications    -  renin and aldosterone pending    2  Coronary artery disease with history of CABG () and PCI of left main (): Patient tolerating Coreg 6 25 mg b i d     -  continue statin therapy    -  recommend aspirin 81 mg daily    -  for Lexiscan nuclear stress test today to evaluate for ischemia    3  Chronic angina:  Patient on Ranexa 500 mg b i d     - for Lexiscan nuclear stress test today to evaluate for ischemia    4  Diabetes:  Hemoglobin A1c 5 1  Managed per primary team    5  Proteinuria secondary #1:  Continue ACE-inhibitor    -  periodically monitoring    6  COPD/tobacco abuse:  Currently on nicotine replacement, encourage smoking cessation    7  Alcohol use:  Patient states that he is decreasing his usage and attending [place] [address] meetings  Subjective / Objective:   Patient seen and examined  He states that he is feeling much better  Blood pressures vastly improved and he is tolerating medications  Arterial duplex of renal arteries, tentative finding negative  Echo and nuclear stress test pending    Vitals: Blood pressure 123/64, pulse 66, temperature 98 °F (36 7 °C), temperature source Oral, resp  rate 18, weight 93 1 kg (205 lb 4 oz), SpO2 97 %    Vitals:    22 0540 22 0534   Weight: 89 8 kg (197 lb 15 6 oz) 93 1 kg (205 lb 4 oz)     Body mass index is 26 35 kg/m²   BP Readings from Last 3 Encounters:   11/25/22 123/64   11/23/22 (!) 200/100   11/17/22 (!) 172/80     Orthostatic Blood Pressures    Flowsheet Row Most Recent Value   Blood Pressure 123/64 filed at 11/25/2022 0245   Patient Position - Orthostatic VS Lying filed at 11/24/2022 1910        I/O       11/23 0701  11/24 0700 11/24 0701  11/25 0700 11/25 0701  11/26 0700    P  O   600     Total Intake(mL/kg)  600 (6 4)     Urine (mL/kg/hr) 2250 1650 (0 7)     Total Output 2250 1650     Net -2250 -1050                Invasive Devices     Peripheral Intravenous Line  Duration           Peripheral IV 11/23/22 Right Antecubital 1 day                  Intake/Output Summary (Last 24 hours) at 11/25/2022 0745  Last data filed at 11/25/2022 0601  Gross per 24 hour   Intake 600 ml   Output 1350 ml   Net -750 ml         Physical Exam:   Physical Exam  Vitals and nursing note reviewed  Constitutional:       General: He is not in acute distress  Appearance: Normal appearance  He is obese  HENT:      Right Ear: External ear normal       Left Ear: External ear normal    Eyes:      General: No scleral icterus  Right eye: No discharge  Left eye: No discharge  Cardiovascular:      Rate and Rhythm: Normal rate and regular rhythm  Pulses: Normal pulses  Heart sounds: Normal heart sounds  No murmur heard  Pulmonary:      Effort: Pulmonary effort is normal  No accessory muscle usage or respiratory distress  Breath sounds: Normal breath sounds  No wheezing  Comments: Coarse breath sounds  Abdominal:      General: Bowel sounds are normal  There is no distension  Palpations: Abdomen is soft  Musculoskeletal:      Right lower leg: No edema  Left lower leg: No edema  Skin:     General: Skin is warm and dry  Capillary Refill: Capillary refill takes less than 2 seconds  Neurological:      General: No focal deficit present        Mental Status: He is alert and oriented to person, place, and time  Mental status is at baseline     Psychiatric:         Mood and Affect: Mood normal             Medications/ Allergies:     Current Facility-Administered Medications   Medication Dose Route Frequency Provider Last Rate   • acetaminophen  650 mg Oral Q6H PRN Tiffani Ashley MD     • albuterol  2 puff Inhalation Q4H PRN Liana Elliott DO     • aspirin  81 mg Oral Daily CHELSEA Wilder     • enoxaparin  40 mg Subcutaneous Daily Melissa Rose, DO     • famotidine  20 mg Oral BID Liana Elliott, DO     • ferrous sulfate  325 mg Oral BID With Meals Liana Elliott DO     • fluticasone-vilanterol  1 puff Inhalation Daily Melissa Rose, DO     • folic acid  2,207 mcg Oral Daily Melissa Rose DO     • gabapentin  300 mg Oral TID Liana Elliott DO     • insulin lispro  1-6 Units Subcutaneous 4x Daily (AC & HS) Lois Kitchen, DO     • lisinopril  20 mg Oral Daily CHELSEA Wilder     • magnesium oxide  400 mg Oral BID Liana Elliott DO     • melatonin  6 mg Oral HS PRN Tiffani Ashley MD     • nicotine  1 patch Transdermal Daily Melissa Rose, DO     • pantoprazole  40 mg Oral Early Morning Melissa Rose, DO     • pravastatin  40 mg Oral Daily With [place], DO     • ranolazine  500 mg Oral Q12H Htet Kimberly Marmolejo MD     • tamsulosin  0 4 mg Oral Daily Melissa Rose, DO     • thiamine  100 mg Oral Daily Melissa Rose, DO       acetaminophen, 650 mg, Q6H PRN  albuterol, 2 puff, Q4H PRN  melatonin, 6 mg, HS PRN      No Known Allergies    VTE Pharmacologic Prophylaxis:   Sequential compression device (Venodyne)     Labs:   Troponins:  Results from last 7 days   Lab Units 11/23/22  1557 11/23/22  1406   HSTNI D2 ng/L  --  -3   HSTNI D4 ng/L -4  --        CBC with diff:  Results from last 7 days   Lab Units 11/25/22  0502 11/24/22  0455 11/23/22  1137   WBC Thousand/uL 6 02 5 61 6 03   HEMOGLOBIN g/dL 12 9 13 2 12 8   HEMATOCRIT % 38 2 39 5 37 9   MCV fL 98 99* 98 PLATELETS Thousands/uL 192 188 184   MCH pg 33 2 33 2 33 2   MCHC g/dL 33 8 33 4 33 8   RDW % 13 0 13 0 13 2   MPV fL 10 0 9 9 9 7   NRBC AUTO /100 WBCs 0 0 0     CMP:  Results from last 7 days   Lab Units 11/25/22  0502 11/24/22  0455 11/23/22  1137 11/22/22  0952   SODIUM mmol/L 135 133* 133* 134*   POTASSIUM mmol/L 4 4 4 4 4 7 4 8   CHLORIDE mmol/L 98 97 97 102   CO2 mmol/L 29 28 28 27   ANION GAP mmol/L 8 8 8 5   BUN mg/dL 24 25 22 22   CREATININE mg/dL 1 04 1 10 1 21 1 12   GLUCOSE FASTING mg/dL  --   --   --  97   CALCIUM mg/dL 8 9 9 5 9 8 9 9   AST U/L 35  --  42* 49*   ALT U/L 40  --  29 36   ALK PHOS U/L 57  --  60 66   TOTAL PROTEIN g/dL 7 1  --  7 5 7 6   ALBUMIN g/dL 3 2*  --  4 0 3 5   TOTAL BILIRUBIN mg/dL 0 34  --  0 56 0 53   EGFR ml/min/1 73sq m 77 72 64 71     Magnesium:  Results from last 7 days   Lab Units 11/25/22  0502 11/24/22  0455 11/22/22  0952   MAGNESIUM mg/dL 1 7 1 9 2 1     TSH:  Results from last 7 days   Lab Units 11/23/22  1557   TSH 3RD GENERATON uIU/mL 0 518       Imaging & Testing   I have personally reviewed pertinent reports  XR chest portable    Result Date: 11/23/2022  Narrative: CHEST INDICATION:   hypertension  COMPARISON:  Chest radiograph dated 4/9/2022  EXAM PERFORMED/VIEWS:  XR CHEST PORTABLE FINDINGS:  Status post median sternotomy  Cardiomediastinal silhouette appears unremarkable  The lungs are clear  No pneumothorax or pleural effusion  Partially included cervical spine fixation hardware  Impression: No acute cardiopulmonary disease  Workstation performed: [id number]       EKG / Monitor: Personally reviewed  Sinus rhythm without ectopy        [facility]  Cardiology      "This note was completed in part utilizing [employer] direct voice recognition software     Grammatical errors, random word insertion, spelling mistakes, and incomplete sentences may be an occasional consequence of the system secondary to software limitations, ambient noise and hardware issues  Please read the chart carefully and recognize, using context, where substitutions have occurred    If you have any questions or concerns about the context, text or information contained within the body of this dictation, please contact myself, the provider, for further clarification "

## 2022-11-25 NOTE — DISCHARGE INSTRUCTIONS
How to Stop Smoking   WHAT YOU NEED TO KNOW:   You will improve your health and the health of others around you if you stop smoking  Your risk for heart and lung disease, cancer, stroke, heart attack, and vision problems will also decrease  Your adolescent can help prevent or stop harm to his or her brain or body  This will help him or her become a healthy adult  You can benefit from quitting no matter how long you have smoked  DISCHARGE INSTRUCTIONS:   Prepare to stop smoking:  Nicotine is a highly addictive drug found in cigarettes  Withdrawal symptoms can happen when you stop smoking and make it hard to quit  These include anxiety, depression, irritability, trouble sleeping, and increased appetite  You increase your chances of success if you prepare to quit  Set a quit date  Onealina Button a date that is within the next 2 weeks  Do not pick a day that you think may be stressful or busy  Write down the day or Robinson it on your calender  Tell friends and family that you plan to quit  Explain that you may have withdrawal symptoms when you try to quit  Ask them to support you  They may be able to encourage you and help reduce your stress to make it easier for you to quit  Make a list of your reasons for quitting  Put the list somewhere you will see it every day, such as your refrigerator  You can look at the list when you have a craving  Remove all tobacco and nicotine products from your home, car, and workplace  Also, remove anything else that will tempt you to smoke, such as lighters, matches, or ashtrays  Clean your car, home, and places at work that smell like smoke  The smell of smoke can trigger a craving  Identify triggers that make you want to smoke  This may include activities, feelings, or people  Also write down 1 way you can deal with each of your triggers  For example, if you want to smoke as soon as you wake up, plan another activity during this time, such as exercise      Make a plan for how you will quit  Learn about the tools that can help you quit, such as medicine, counseling, or nicotine replacement therapy  Choose at least 2 options to help you quit  Help your adolescent make a plan to quit  The plan will be more successful if your adolescent makes his or her own decisions  Do not try to pressure him or her to quit immediately or in a certain way  Be supportive and offer help if needed  Tools to help you stop smoking:   Counseling  from a trained healthcare provider can provide you with support and skills to quit smoking  The provider will also teach you to manage your withdrawal symptoms and cravings  You may receive counseling from one counselor, in group therapy, or through phone therapy called a quit line  Nicotine replacement therapy (NRT)  such as nicotine patches, gum, or lozenges may help reduce your nicotine cravings  You may get these without a doctor's order  Do not use e-cigarettes or smokeless tobacco in place of cigarettes or to help you quit  They still contain nicotine  Prescription medicines  such as nasal sprays or nicotine inhalers may help reduce your withdrawal symptoms  Other medicines may also be used to reduce your urge to smoke  Ask your healthcare provider about these medicines  You may need to start certain medicines 2 weeks before your quit date for them to work well  Hypnosis  is a practice that helps guide you through thoughts and feelings  Hypnosis may help decrease your cravings and make you more willing to quit  Acupuncture therapy  uses very thin needles to balance energy channels in the body  This is thought to help decrease cravings and symptoms of nicotine withdrawal     Support groups  let you talk to others who are trying to quit or have already quit  It may be helpful to speak with others about how they quit  Manage your cravings:   Avoid situations, people, and places that tempt you to smoke    Go to nonsmoking places, such as libraries or restaurants  Understand what tempts you and try to avoid these things  Keep your hands busy  Hold things such as a stress ball or pen  Put candy or toothpicks in your mouth  Keep lollipops, sugarless gum, or toothpicks with you at all times  Do not have alcohol or caffeine  These drinks may tempt you to smoke  Drink healthy liquids such as water or juice instead  Reward yourself when you resist your cravings  Rewards will motivate you and help you stay positive  Do an activity that distracts you from your craving  Examples include cleaning, creating art, or gardening  Prevent weight gain after you quit:  You may gain a few pounds after you quit smoking  It is healthier for you to gain a few pounds than to continue to smoke  The following can help you prevent weight gain:  Eat a variety of healthy foods  Healthy foods include fruits, vegetables, whole-grain breads, low-fat dairy products, beans, lean meats, and fish  Eat healthy snacks, such as low-fat yogurt, if you get hungry between meals  Drink water before, during, and between meals  This will make your stomach feel full and help prevent you from overeating  Ask your healthcare provider how much liquid to drink each day and which liquids are best for you  Be physically active  Activity may help reduce your cravings and reduce stress  Take a walk or do some kind of physical activity every day  Ask your healthcare provider which activities are right for you  For support and more information:   [employer]  [address] [facility]  [address]  Phone: [facility]  Phone: 4- 358 [phone]  Web Address: Marielos perez    Smokefree  gov  Phone: 9- 567 - 661-2818  Web Address: www smokefree  [facility] 21 2022 Information is for End User's use only and may not be sold, redistributed or otherwise used for commercial purposes   All illustrations and images included in CareNotes® are the copyrighted property of A D A M , Inc  or [facility]   The above information is an  only  It is not intended as medical advice for individual conditions or treatments  Talk to your doctor, nurse or pharmacist before following any medical regimen to see if it is safe and effective for you  Hypertension   WHAT YOU NEED TO KNOW:   Hypertension is high blood pressure  Your blood pressure is the force of your blood moving against the walls of your arteries  Hypertension causes your blood pressure to get so high that your heart has to work much harder than normal  This can damage your heart  The cause of hypertension may not be known  This is called essential or primary hypertension  Hypertension caused by another medical condition, such as kidney disease, is called secondary hypertension  DISCHARGE INSTRUCTIONS:   Call your local emergency number (911 in the [address] [place] [address],[address]) or have someone call if:   You have chest pain  You have any of the following signs of a heart attack:      Squeezing, pressure, or pain in your chest    You may  also have any of the following:     Discomfort or pain in your back, neck, jaw, stomach, or arm    Shortness of breath    Nausea or vomiting    Lightheadedness or a sudden cold sweat    You become confused or have trouble speaking  You suddenly feel lightheaded or have trouble breathing  Return to the emergency department if:   You have a severe headache or vision loss  You have weakness in an arm or leg  Call your doctor or cardiologist if:   You feel faint, dizzy, confused, or drowsy  You have been taking your blood pressure medicine but your pressure is higher than your provider says it should be  You have questions or concerns about your condition or care  Medicines: You may  need any of the following:  Antihypertensives  may be used to help lower your blood pressure  Several kinds of medicines are available   Your healthcare provider will choose medicines based on the kind of hypertension you have  You may need more than one type of medicine  Take the medicine exactly as directed  Diuretics  help decrease extra fluid that collects in your body  This will help lower your blood pressure  You may urinate more often while you take this medicine  Cholesterol medicine  helps lower your cholesterol level  A low cholesterol level helps prevent heart disease and makes it easier to control your blood pressure  Take your medicine as directed  Contact your healthcare provider if you think your medicine is not helping or if you have side effects  Tell him or her if you are allergic to any medicine  Keep a list of the medicines, vitamins, and herbs you take  Include the amounts, and when and why you take them  Bring the list or the pill bottles to follow-up visits  Carry your medicine list with you in case of an emergency  Follow up with your doctor or cardiologist as directed: You will need to return to have your blood pressure checked and to have other lab tests done  Write down your questions so you remember to ask them during your visits  Stages of hypertension:       Normal blood pressure is 119/79 or lower   Your healthcare provider may only check your blood pressure each year if it stays at a normal level  Elevated blood pressure is 120/79 to 129/79   This is sometimes called prehypertension  Your healthcare provider may suggest lifestyle changes to help lower your blood pressure to a normal level  He or she may then check it again in 3 to 6 months  Stage 1 hypertension is 130/80  to 139/89   Your provider may recommend lifestyle changes, medication, and checks every 3 to 6 months until your blood pressure is controlled  Stage 2 hypertension is 140/90 or higher   Your provider will recommend lifestyle changes and have you take 2 kinds of hypertension medicines   You will also need to have your blood pressure checked monthly until it is controlled  Manage hypertension:   Check your blood pressure at home  Avoid smoking, caffeine, and exercise at least 30 minutes before checking your blood pressure  Sit and rest for 5 minutes before you take your blood pressure  Extend your arm and support it on a flat surface  Your arm should be at the same level as your heart  Follow the directions that came with your blood pressure monitor  Check your blood pressure 2 times, 1 minute apart, before you take your medicine in the morning  Also check your blood pressure before your evening meal  Keep a record of your readings and bring it to your follow-up visits  Ask your healthcare provider what your blood pressure should be  Manage any other health conditions you have  Health conditions such as diabetes can increase your risk for hypertension  Follow your healthcare provider's instructions and take all your medicines as directed  Ask about all medicines  Certain medicines can increase your blood pressure  Examples include oral birth control pills, decongestants, herbal supplements, and NSAIDs, such as ibuprofen  Your healthcare provider can tell you which medicines are safe for you to take  This includes prescription and over-the-counter medicines  Lifestyle changes you can make to manage hypertension:  Your healthcare provider may recommend you work with a team to manage hypertension  The team may include medical experts such as a dietitian, an exercise or physical therapist, and a behavior therapist  Your family members may be included in helping you create lifestyle changes  Limit sodium (salt) as directed  Too much sodium can affect your fluid balance  Check labels to find low-sodium or no-salt-added foods  Some low-sodium foods use potassium salts for flavor  Too much potassium can also cause health problems  Your healthcare provider will tell you how much sodium and potassium are safe for you to have in a day   He or she may recommend that you limit sodium to 2,300 mg a day  Follow the meal plan recommended by your healthcare provider  A dietitian or your provider can give you more information on low-sodium plans or the DASH (Dietary Approaches to Stop Hypertension) eating plan  The DASH plan is low in sodium, processed sugar, unhealthy fats, and total fat  It is high in potassium, calcium, and fiber  These can be found in vegetables, fruit, and whole-grain foods  Be physically active throughout the day  Physical activity, such as exercise, can help control your blood pressure and your weight  Be physically active for at least 30 minutes per day, on most days of the week  Include aerobic activity, such as walking or riding a bicycle  Also include strength training at least 2 times each week  Your healthcare providers can help you create a physical activity plan  Decrease stress  This may help lower your blood pressure  Learn ways to relax, such as deep breathing or listening to music  Limit alcohol as directed  Alcohol can increase your blood pressure  A drink of alcohol is 12 ounces of beer, 5 ounces of wine, or 1½ ounces of liquor  Do not smoke  Nicotine and other chemicals in cigarettes and cigars can increase your blood pressure and also cause lung damage  Ask your healthcare provider for information if you currently smoke and need help to quit  E-cigarettes or smokeless tobacco still contain nicotine  Talk to your healthcare provider before you use these products  © Copyright [employer] 2022 Information is for End User's use only and may not be sold, redistributed or otherwise used for commercial purposes  All illustrations and images included in CareNotes® are the copyrighted property of A D A M , Inc  or [employer]  The above information is an  only  It is not intended as medical advice for individual conditions or treatments   Talk to your doctor, nurse or pharmacist before following any medical regimen to see if it is safe and effective for you

## 2022-11-25 NOTE — CASE MANAGEMENT
Case Management Discharge Planning Note    Patient name Manny Candelario  Location [address] [place] [address]-* MRN [id number]  : 1960 Date 2022       Current Admission Date: 2022  Current Admission Diagnosis:Essential hypertension   Patient Active Problem List    Diagnosis Date Noted   • Stable angina ([facility] ) 2022   • Stage 3a chronic kidney disease ([facility] ) 2022   • Fatty liver, alcoholic    • Nonischemic nontraumatic myocardial injury 04/10/2022   • History of atrial fibrillation 10/25/2021   • Poor historian 10/24/2021   • Mild protein-calorie malnutrition ([facility] ) 2021   • Prostate cancer ([facility] ) 2021   • [facility] 2020   • Chronic heart failure with preserved ejection fraction ([facility] ) 2019   • Alcohol abuse 10/17/2019   • Hyponatremia 10/17/2019   • Cervical spinal stenosis 10/17/2019   • Thrombocytopenia ([facility] ) 2019   • History of prostate cancer 2019   • CAD (coronary artery disease) 2019   • Nicotine abuse 2019   • Hypomagnesemia 10/10/2018   • Depression, recurrent ([facility] ) 10/10/2018   • Hx of CABG 10/10/2018   • Dyslipidemia 10/10/2018   • COPD (chronic obstructive pulmonary disease) ([facility] ) 10/09/2018   • Type 2 diabetes mellitus ([facility] ) 10/09/2018   • Essential hypertension 10/09/2018      LOS (days): 2  Geometric Mean LOS (GMLOS) (days): 2 10  Days to GMLOS:0     OBJECTIVE:  Risk of Unplanned Readmission Score: 25 89         Current admission status: Inpatient   Preferred Pharmacy:   [address] [place] [address]  Phone: [phone] Fax: [phone]    Primary Care Provider: Abrahan Case MD    Primary Insurance: MEDICARE  Secondary Insurance:     DISCHARGE DETAILS:  CM spoke with patient at the bedside  Patient confirmed he needs transport home at discharge  Patient discharging to address [address]  Patient has keys to his apartment  Patient updated CM will set up [employer] transport for him  Nursing updated  CM called [facility] at [phone]   Patient set up with [facility]

## 2022-11-25 NOTE — NURSING NOTE
IV taken out  MD gone over instructions with patient, no questions from pt at this time  Awaiting [employer] to  patient

## 2022-11-25 NOTE — PROGRESS NOTES
Pt back from nuclear stress test  Diet was changed however pt insistent that current diet is incorrect, still does not want to take medications until diet is changed and food arrives

## 2022-11-25 NOTE — PLAN OF CARE
Problem: PAIN - ADULT  Goal: Verbalizes/displays adequate comfort level or baseline comfort level  Description: Interventions:  - Encourage patient to monitor pain and request assistance  - Assess pain using appropriate pain scale  - Administer analgesics based on type and severity of pain and evaluate response  - Implement non-pharmacological measures as appropriate and evaluate response  - Consider cultural and social influences on pain and pain management  - Notify physician/advanced practitioner if interventions unsuccessful or patient reports new pain  Outcome: Progressing     Problem: Potential for Falls  Goal: Patient will remain free of falls  Description: INTERVENTIONS:  - Educate patient/family on patient safety including physical limitations  - Instruct patient to call for assistance with activity   - Consult OT/PT to assist with strengthening/mobility   - Keep Call bell within reach  - Keep bed low and locked with side rails adjusted as appropriate  - Keep care items and personal belongings within reach  - Initiate and maintain comfort rounds  - Make Fall Risk Sign visible to staff  - Initiate/Maintain bed alarm  - Obtain necessary fall risk management equipment: walker  - Apply yellow socks and bracelet for high fall risk patients  - Consider moving patient to room near nurses station  Outcome: Progressing

## 2022-11-25 NOTE — PROGRESS NOTES
Pt would like RN to hold PO medications until he is able to eat something  States he does not take medications until there is something in his stomach

## 2022-11-25 NOTE — PROGRESS NOTES
Daily Progress Note - [facility] SPECIALTY [facility] Medicine Residency  Kesha Garber 63 y o  male MRN: [id number]  Unit/Bed#: [address] Encounter: [id number]  Admitting Physician: Solomon Burrell DO   PCP: Deshawn Mosley MD  Date of Admission:  11/23/2022 11:22 AM    Assessment and Plan    * Hypertensive urgency  Assessment & Plan  72-year-old male with PMH of CAD s/p CABG presented with worsening SOB and chest tightness, and patient feels hypertension could be related to recent change in BP medications  He was sent in from Dr Lyndel Osgood office for hypertensive urgency  He saw Dr Augusto Armenta in office to be cleared for cataract surgery on 11/28  · ED course: BP elevated at 224/105, was given labetalol 10 mg which brought down the BP to 159/83  Patient complained of chest pain, and found to have elevated troponin (peak 59)  · Echo in 2020 showed moderate concentric LVH with EF of 55-60%   · UDS: normal  · EKG: NSR, possible left atrial enlargement, similar to previous EKGs in the past  · Troponin: 59>56>55  · CXR:  No acute cardiopulmonary disease    Plan:  · 48 hour telemetry  · Secondary HTN workup:  VA AS renal scan, renin aldosterone ratio, plasma renin and aldosterone levels  · Echo and Lexiscan nuclear stress test on Friday  · Cardiology consulted, appreciate recs  · Increased lisinopril to 20 mg daily  · carvedilol 6 25 mg b i d  (holding 11/25 AM prior to [facility])  · aspirin 81 mg daily  · Discontinued Lopressor from home med    Nonischemic nontraumatic myocardial injury  Assessment & Plan  Troponin on admission was 59>56>55  Troponin on 04/09/2022 was 97  Normal EKG     48 hour telemetry    Essential hypertension  Assessment & Plan  Chronic, stable    Home meds:  Increased Lisinopril 5 mg to lisinopril 20 mg,   Discontinued Lopressor 12 5 mg b i d    Added carvedilol 6 25 mg BID, aspirin 81 mg    Hx of CABG  Assessment & Plan  Troponin level elevated at 59>56>55  Admission on 04/09/2022 showed an troponin level 97  Denies chest pain, nausea, vomiting, diaphoresis    48 hour telemetry  Aspirin 81 mg    COPD (chronic obstructive pulmonary disease) ([facility])  Assessment & Plan  Chronic, stable  Continue home inhalers Breo Ellipta and albuterol    CAD (coronary artery disease)  Assessment & Plan  History of CAD status post CABG 2004, PCI to LM 2014  Was not a candidate for Eliquis secondary to extensive high chronic alcohol use, history of all, there was anemia, and increased risk of bleeding  Continue home medications: Ranexa 12 mg b i d  Discontinued: Lopressor 12 5 mg b i d  Added carvedilol 6 25 mg b i d , aspirin 81 mg    Type 2 diabetes mellitus [facility])  Assessment & Plan  Lab Results   Component Value Date    HGBA1C 5 1 10/17/2022       Recent Labs     11/23/22 2126 11/24/22  0730   POCGLU 112 122       Blood Sugar Average: Last 72 hrs:  (P) 117     Home metformin held  On ISS     Dyslipidemia  Assessment & Plan  Continue home medication:  Statin    Alcohol abuse  Assessment & Plan  Chronic  Continue thiamine and folic acid  On CIWA  UDS neg    Nicotine abuse  Assessment & Plan  Continue nicotine patch  Motivated to quit  Start Chantix on discharge      VTE Pharmacologic Prophylaxis:   Pharmacologic: Enoxaparin (Lovenox)  Mechanical VTE Prophylaxis in Place: Yes    Patient Centered Rounds: I have performed bedside rounds with nursing staff today  Discussions with Specialists or Other Care Team Provider: Cardiology    Education and Discussions with Family / Patient:   Patient Information Sharing: With the consent of Ramona Cisneros , their loved ones Aarvind Fox: son) were notified today by inpatient team of the patient’s condition and current plan  All questions answered  Time Spent for Care: 30 minutes  More than 50% of total time spent on counseling and coordination of care as described above  Current Length of Stay: 2 day(s)    Current Patient Status: Inpatient   Certification Statement:  The patient will continue to require additional inpatient hospital stay due to Cardiology work up      Code Status: Level 1 - Full Code    Subjective:   Patient resting comfortably in bed, and getting an ultrasound of her renal arteries  He had no complaints  He denied nausea, vomiting, diarrhea, constipation, or pain  He notes that his headache is a 2/10 which is better than yesterday  Additionally, he raise concerns about eyedrops needed for his cataract procedure, for which he gave me the paperwork  Objective     Objective:   Vitals:   Temp (24hrs), Av 8 °F (36 6 °C), Min:97 6 °F (36 4 °C), Max:98 1 °F (36 7 °C)    Temp:  [97 6 °F (36 4 °C)-98 1 °F (36 7 °C)] 97 8 °F (36 6 °C)  HR:  [66-92] 77  Resp:  [16-18] 18  BP: (123-194)/() 167/98  SpO2:  [89 %-97 %] 92 %  Body mass index is 26 35 kg/m²  Input and Output Summary (last 24 hours): Intake/Output Summary (Last 24 hours) at 2022 0829  Last data filed at 2022 0601  Gross per 24 hour   Intake 600 ml   Output 950 ml   Net -350 ml       Physical Exam:   Physical Exam  Constitutional:       Appearance: He is normal weight  HENT:      Head: Normocephalic and atraumatic  Right Ear: External ear normal       Left Ear: External ear normal       Nose: Nose normal       Mouth/Throat:      Mouth: Mucous membranes are moist       Pharynx: Oropharynx is clear  Eyes:      Extraocular Movements: Extraocular movements intact  Conjunctiva/sclera: Conjunctivae normal    Cardiovascular:      Rate and Rhythm: Normal rate and regular rhythm  Pulses: Normal pulses  Heart sounds: Normal heart sounds  Pulmonary:      Effort: Pulmonary effort is normal       Breath sounds: Normal breath sounds  Abdominal:      General: Abdomen is flat  Bowel sounds are normal       Palpations: Abdomen is soft  Tenderness: There is abdominal tenderness (unchanged from admission, has had this tenderness from weeks)     Musculoskeletal: General: Normal range of motion  Skin:     General: Skin is warm  Capillary Refill: Capillary refill takes less than 2 seconds  Neurological:      General: No focal deficit present  Mental Status: He is alert and oriented to person, place, and time  Additional Data:     Labs:  Results from last 7 days   Lab Units 11/25/22  0502   WBC Thousand/uL 6 02   HEMOGLOBIN g/dL 12 9   HEMATOCRIT % 38 2   PLATELETS Thousands/uL 192   NEUTROS PCT % 55   LYMPHS PCT % 24   MONOS PCT % 14*   EOS PCT % 5     Results from last 7 days   Lab Units 11/25/22  0502   POTASSIUM mmol/L 4 4   CHLORIDE mmol/L 98   CO2 mmol/L 29   BUN mg/dL 24   CREATININE mg/dL 1 04   CALCIUM mg/dL 8 9   ALK PHOS U/L 57   ALT U/L 40   AST U/L 35         Results from last 7 days   Lab Units 11/25/22  0755 11/24/22  2032 11/24/22  1534 11/24/22  1113 11/24/22  0730   POC GLUCOSE mg/dl 118 119 95 106 122           * I Have Reviewed All Lab Data Listed Above  * Additional Pertinent Lab Tests Reviewed:  Jay 68 Admission Reviewed    Imaging:    Imaging Reports Reviewed Today Include:  No new images in the last 24 hours  Imaging Personally Reviewed by Myself Includes:  No new in the images 24 hours    Recent Cultures (last 7 days):           Last 24 Hours Medication List:   Current Facility-Administered Medications   Medication Dose Route Frequency Provider Last Rate   • acetaminophen  650 mg Oral Q6H PRN Pratibha Navarro MD     • albuterol  2 puff Inhalation Q4H PRN Shu Duggan DO     • aspirin  81 mg Oral Daily CHELSEA Moralez     • carvedilol  6 25 mg Oral BID With Meals CHELSEA Moralez     • enoxaparin  40 mg Subcutaneous Daily Melissa Rose DO     • famotidine  20 mg Oral BID Melissa Rose DO     • ferrous sulfate  325 mg Oral BID With Meals Shu Duggan DO     • fluticasone-vilanterol  1 puff Inhalation Daily Melissa Rose DO     • folic acid  3,705 mcg Oral Daily Shu Duggan, DO     • gabapentin  300 mg Oral TID Karen Shook, DO     • insulin lispro  1-6 Units Subcutaneous 4x Daily (AC & HS) Lois Kitchen, DO     • lisinopril  20 mg Oral Daily CHELSEA Mccullough     • magnesium oxide  400 mg Oral BID Karen Shook, DO     • melatonin  6 mg Oral HS PRN Salty Grant MD     • nicotine  1 patch Transdermal Daily Melissa Rose, DO     • pantoprazole  40 mg Oral Early Morning Melissa Rose, DO     • pravastatin  40 mg Oral Daily With [employer] DO     • ranolazine  500 mg Oral Q12H [address] MD Diana     • tamsulosin  0 4 mg Oral Daily Melissa Rose, DO     • thiamine  100 mg Oral Daily Karen Shook, DO               ** Please Note: Dictation voice to text software may have been used in the creation of this document   **    Marielle Lincoln MD  11/25/22  8:29 AM

## 2022-11-25 NOTE — PLAN OF CARE
Problem: PAIN - ADULT  Goal: Verbalizes/displays adequate comfort level or baseline comfort level  Description: Interventions:  - Encourage patient to monitor pain and request assistance  - Assess pain using appropriate pain scale  - Administer analgesics based on type and severity of pain and evaluate response  - Implement non-pharmacological measures as appropriate and evaluate response  - Consider cultural and social influences on pain and pain management  - Notify physician/advanced practitioner if interventions unsuccessful or patient reports new pain  Outcome: Progressing     Problem: INFECTION - ADULT  Goal: Absence or prevention of progression during hospitalization  Description: INTERVENTIONS:  - Assess and monitor for signs and symptoms of infection  - Monitor lab/diagnostic results  - Monitor all insertion sites, i e  indwelling lines, tubes, and drains  - [place] appropriate cooling/warming therapies per order  - Administer medications as ordered  - Instruct and encourage patient and family to use good hand hygiene technique  - Identify and instruct in appropriate isolation precautions for identified infection/condition  Outcome: Progressing  Goal: Absence of fever/infection during neutropenic period  Description: INTERVENTIONS:  - Monitor WBC    Outcome: Progressing     Problem: SAFETY ADULT  Goal: Patient will remain free of falls  Description: INTERVENTIONS:  - Educate patient/family on patient safety including physical limitations  - Instruct patient to call for assistance with activity   - Consult OT/PT to assist with strengthening/mobility   - Keep Call bell within reach  - Keep bed low and locked with side rails adjusted as appropriate  - Keep care items and personal belongings within reach  - Initiate and maintain comfort rounds  - Make Fall Risk Sign visible to staff  - Initiate/Maintain bed alarm  - Obtain necessary fall risk management equipment: walker  - Apply yellow socks and bracelet for high fall risk patients  - Consider moving patient to room near nurses station  Outcome: Progressing  Goal: Maintain or return to baseline ADL function  Description: INTERVENTIONS:  -  Assess patient's ability to carry out ADLs; assess patient's baseline for ADL function and identify physical deficits which impact ability to perform ADLs (bathing, care of mouth/teeth, toileting, grooming, dressing, etc )  - Assess/evaluate cause of self-care deficits   - Assess range of motion  - Assess patient's mobility; develop plan if impaired  - Assess patient's need for assistive devices and provide as appropriate  - Encourage maximum independence but intervene and supervise when necessary  - Involve family in performance of ADLs  - Assess for home care needs following discharge   - Consider OT consult to assist with ADL evaluation and planning for discharge  - Provide patient education as appropriate  Outcome: Progressing  Goal: Maintains/Returns to pre admission functional level  Description: INTERVENTIONS:  - Perform BMAT or MOVE assessment daily    - Set and communicate daily mobility goal to care team and patient/family/caregiver     - Collaborate with rehabilitation services on mobility goals if consulted  - Stand patient 3 times a day  - Ambulate patient 3 times a day  - Out of bed to chair 3 times a day   - Out of bed for meals 3 times a day  - Out of bed for toileting  - Record patient progress and toleration of activity level   Outcome: Progressing     Problem: DISCHARGE PLANNING  Goal: Discharge to home or other facility with appropriate resources  Description: INTERVENTIONS:  - Identify barriers to discharge w/patient and caregiver  - Arrange for needed discharge resources and transportation as appropriate  - Identify discharge learning needs (meds, wound care, etc )  - Arrange for interpretive services to assist at discharge as needed  - Refer to Case Management Department for coordinating discharge planning if the patient needs post-hospital services based on physician/advanced practitioner order or complex needs related to functional status, cognitive ability, or social support system  Outcome: Progressing     Problem: Knowledge Deficit  Goal: Patient/family/caregiver demonstrates understanding of disease process, treatment plan, medications, and discharge instructions  Description: Complete learning assessment and assess knowledge base  Interventions:  - Provide teaching at level of understanding  - Provide teaching via preferred learning methods  Outcome: Progressing     Problem: MOBILITY - ADULT  Goal: Maintain or return to baseline ADL function  Description: INTERVENTIONS:  -  Assess patient's ability to carry out ADLs; assess patient's baseline for ADL function and identify physical deficits which impact ability to perform ADLs (bathing, care of mouth/teeth, toileting, grooming, dressing, etc )  - Assess/evaluate cause of self-care deficits   - Assess range of motion  - Assess patient's mobility; develop plan if impaired  - Assess patient's need for assistive devices and provide as appropriate  - Encourage maximum independence but intervene and supervise when necessary  - Involve family in performance of ADLs  - Assess for home care needs following discharge   - Consider OT consult to assist with ADL evaluation and planning for discharge  - Provide patient education as appropriate  Outcome: Progressing  Goal: Maintains/Returns to pre admission functional level  Description: INTERVENTIONS:  - Perform BMAT or MOVE assessment daily    - Set and communicate daily mobility goal to care team and patient/family/caregiver     - Collaborate with rehabilitation services on mobility goals if consulted  - Stand patient 3 times a day  - Ambulate patient 3 times a day  - Out of bed to chair 3 times a day   - Out of bed for meals 3 times a day  - Out of bed for toileting  - Record patient progress and toleration of activity level Outcome: Progressing     Problem: Potential for Falls  Goal: Patient will remain free of falls  Description: INTERVENTIONS:  - Educate patient/family on patient safety including physical limitations  - Instruct patient to call for assistance with activity   - Consult OT/PT to assist with strengthening/mobility   - Keep Call bell within reach  - Keep bed low and locked with side rails adjusted as appropriate  - Keep care items and personal belongings within reach  - Initiate and maintain comfort rounds  - Make Fall Risk Sign visible to staff  - Initiate/Maintain bed alarm  - Obtain necessary fall risk management equipment: walker  - Apply yellow socks and bracelet for high fall risk patients  - Consider moving patient to room near nurses station  Outcome: Progressing

## 2022-11-28 DIAGNOSIS — F10.10 ALCOHOL ABUSE: Chronic | ICD-10-CM

## 2022-11-28 RX ORDER — FERROUS SULFATE 325(65) MG
325 TABLET ORAL 2 TIMES DAILY WITH MEALS
Qty: 60 TABLET | Refills: 5 | Status: SHIPPED | OUTPATIENT
Start: 2022-11-28 | End: 2022-12-28

## 2022-11-29 LAB — ALDOST SERPL-MCNC: <1 NG/DL (ref 0–30)

## 2022-11-30 LAB
ALDOST SERPL-MCNC: <1 NG/DL (ref 0–30)
ALDOST/RENIN PLAS-RTO: <2.8 {RATIO} (ref 0–30)
RENIN PLAS-CCNC: 0.33 NG/ML/HR (ref 0.17–5.38)
RENIN PLAS-CCNC: 0.36 NG/ML/HR (ref 0.17–5.38)

## 2022-12-15 DIAGNOSIS — F10.10 ALCOHOL ABUSE: Chronic | ICD-10-CM

## 2022-12-15 DIAGNOSIS — J44.9 COPD (CHRONIC OBSTRUCTIVE PULMONARY DISEASE) (HCC): ICD-10-CM

## 2022-12-15 RX ORDER — METHION/INOS/CHOL BT/B COM/LIV 110MG-86MG
CAPSULE ORAL
Qty: 90 TABLET | Refills: 0 | Status: SHIPPED | OUTPATIENT
Start: 2022-12-15

## 2022-12-15 NOTE — PROGRESS NOTES
Called the patient to follow up on his ED visit  He reports that he has not had a drink since he was discharged  He is taking Librium PRN as ordered  He has mild hand tremors  He denies pain, SOB, palpitations, n/v     He spoke to [address] will be meeting with her tomorrow  He is unsure of who she is with or the name of the program, but he will be going daily  They send transportation  He is excited to start & states a desire to quit drinking  Update sent to Casey Wen, OP CM  Klisyri Counseling:  I discussed with the patient the risks of Klisyri including but not limited to erythema, scaling, itching, weeping, crusting, and pain.

## 2022-12-21 ENCOUNTER — OFFICE VISIT (OUTPATIENT)
Dept: GASTROENTEROLOGY | Facility: CLINIC | Age: 62
End: 2022-12-21

## 2022-12-21 VITALS
BODY MASS INDEX: 26.12 KG/M2 | DIASTOLIC BLOOD PRESSURE: 67 MMHG | SYSTOLIC BLOOD PRESSURE: 115 MMHG | WEIGHT: 203.4 LBS | HEART RATE: 79 BPM

## 2022-12-21 DIAGNOSIS — B19.20 HEPATITIS C TEST POSITIVE: Primary | ICD-10-CM

## 2022-12-21 DIAGNOSIS — Z12.12 ENCOUNTER FOR COLORECTAL CANCER SCREENING: ICD-10-CM

## 2022-12-21 DIAGNOSIS — Z12.11 ENCOUNTER FOR COLORECTAL CANCER SCREENING: ICD-10-CM

## 2022-12-21 DIAGNOSIS — R13.10 DYSPHAGIA, UNSPECIFIED TYPE: ICD-10-CM

## 2022-12-21 NOTE — PROGRESS NOTES
[facility] - Outpatient Follow-up Note  Nikki Solorzano 63 y o  male MRN: [id number]  Encounter: [id number]          ASSESSMENT AND PLAN:      1  Hepatitis C test positive  Will order confirmatory blood work but did explain to him detrimental affects of alcohol use along with hepatitis C  recent ultrasound did not show any splenomegaly and did show improvement of the hepatic steatosis and LFTs during hospitalization were normal with improvement of thrombocytopenia  INR also was normal   Once hepatitis RNA PCR is done we can make further recommendations regarding treatment   - Hepatitis C RNA, quantitative, PCR; Future  - Hepatitis C genotype; Future    2  Encounter for colorectal cancer screening  We will schedule colonoscopy at this time, Golytely prep was ordered  - polyethylene glycol (GOLYTELY) 4000 mL solution; Take 4,000 mL by mouth once for 1 dose  Dispense: 4000 mL; Refill: 0    3  Dysphagia, unspecified type  Patient had history of difficulty swallowing which subsequently has improved but we will plan endoscopy for evaluation history of the same as well as screening for any varices     ______________________________________________________________________    SUBJECTIVE:    This is a 63-year-old male who presents today for evaluation of a positive hepatitis C antibody which was low reactive  He does have history of heavy alcohol consumption and last time I saw him he was drinking heavily and we were recommending procedures but he was reporting that he cannot tolerate procedures due to alcohol withdrawal symptoms  Currently he is doing much better and was hospitalized and states that he cut back significantly on his drinking  Patient did have low reactive to hepatitis C and also had an anti-smooth muscle antibody which was minimally elevated at 20 but RAUL and AMA were negative  Patient also noted to have low iron, was recommended to have EGD and colonoscopy thereafter    He reports that he is going into the program because he had several DUIs and is going to stop drinking completely  Does continue to smoke but cut down  Patient was noted to have elevated blood pressure and was hospitalized and now blood pressure is more under control and they did clear him for cataract surgery with normal ejection fraction on echo and stress test     REVIEW OF SYSTEMS IS OTHERWISE NEGATIVE  Historical Information   Past Medical History:   Diagnosis Date   • Acute on chronic kidney failure ([facility] )    • Alcohol withdrawal ([facility] ) 6/7/2019   • Cancer ([facility] )     prostate ca,had radiation   • Cardiac disease     stents,then triple bypass   • COPD (chronic obstructive pulmonary disease) ([facility] )    • ETOH abuse    • Hx of heart artery stent     2014   • Hyperlipidemia    • Hypertension    • Hypovolemic shock ([facility] ) 12/22/2019   • Lumbar spondylitis ([facility] ) 10/13/2022   • Nasal bone fracture 10/10/2022   • Prostate CA ([facility] )    • S/P CABG x 1     2004     Past Surgical History:   Procedure Laterality Date   • CORONARY ARTERY BYPASS GRAFT  2004   • HI ARTHRODESIS ANT INTERBODY MIN DISCECTOMY, CERVICAL BELOW C2 N/A 12/16/2020    Procedure: Anterior cervical discectomy with fusion C4-C7;  Posterior cervical decompression and fusion C2-T2;  Surgeon: Harman Kerr MD;  Location: [facility];  Service: Neurosurgery   • TONSILLECTOMY       Social History   Social History     Substance and Sexual Activity   Alcohol Use Yes   • Alcohol/week: 4 0 standard drinks   • Types: 4 Standard drinks or equivalent per week    Comment: yesterday     Social History     Substance and Sexual Activity   Drug Use No     Social History     Tobacco Use   Smoking Status Every Day   • Packs/day: 0 50   • Years: 40 00   • Pack years: 20 00   • Types: Cigarettes   Smokeless Tobacco Never     Family History   Problem Relation Age of Onset   • Diabetes Mother    • Uterine cancer Mother    • COPD Father    • Hypertension Father        Meds/Allergies Current Outpatient Medications:   •  albuterol (Ventolin HFA) 90 mcg/act inhaler  •  Blood Glucose Monitoring Suppl (ONE TOUCH ULTRA MINI) w/Device KIT  •  Breo Ellipta 200-25 MCG/ACT inhaler  •  carvedilol (COREG) 6 25 mg tablet  •  famotidine (PEPCID) 20 mg tablet  •  ferrous sulfate 325 (65 Fe) mg tablet  •  folic acid (FOLVITE) 1 mg tablet  •  gabapentin (NEURONTIN) 300 mg capsule  •  lisinopril (ZESTRIL) 20 mg tablet  •  magnesium Oxide (MAG-OX) 400 mg TABS  •  metFORMIN (GLUCOPHAGE) 1000 MG tablet  •  nicotine (NICODERM CQ) 21 mg/24 hr TD 24 hr patch  •  nicotine polacrilex (NICORETTE) 2 mg gum  •  ONETOUCH DELICA LANCETS FINE MISC  •  pantoprazole (PROTONIX) 40 mg tablet  •  polyethylene glycol (GOLYTELY) 4000 mL solution  •  pravastatin (PRAVACHOL) 40 mg tablet  •  ranolazine (RANEXA) 500 mg 12 hr tablet  •  tamsulosin (FLOMAX) 0 4 mg  •  Thiamine HCl (vitamin B-1) 100 MG TABS  •  varenicline (CHANTIX) 0 5 mg tablet  •  aspirin (ECOTRIN LOW STRENGTH) 81 mg EC tablet  •  prednisoLONE acetate (PRED FORTE) 1 % ophthalmic suspension    No Known Allergies        Objective     Blood pressure 115/67, pulse 79, weight 92 3 kg (203 lb 6 4 oz)  Body mass index is 26 12 kg/m²  PHYSICAL EXAM:      General Appearance:   Alert, cooperative, no distress   HEENT:   Normocephalic, atraumatic, anicteric      Neck:  Supple, symmetrical, trachea midline   Lungs:   Clear to auscultation bilaterally; no rales, rhonchi or wheezing; respirations unlabored    Heart[de-identified]   Regular rate and rhythm; no murmur, rub, or gallop  Abdomen:   Soft, non-tender, non-distended; normal bowel sounds; no masses, no organomegaly    Genitalia:   Deferred    Rectal:   Deferred    Extremities:  No cyanosis, clubbing or edema    Pulses:  2+ and symmetric    Skin:  No jaundice, rashes, or lesions    Lymph nodes:  No palpable cervical lymphadenopathy        Lab Results:   No visits with results within 1 Day(s) from this visit     Latest known visit with results is:   No results displayed because visit has over 200 results  Radiology Results:   XR chest portable    Result Date: 11/23/2022  Narrative: CHEST INDICATION:   hypertension  COMPARISON:  Chest radiograph dated 4/9/2022  EXAM PERFORMED/VIEWS:  XR CHEST PORTABLE FINDINGS:  Status post median sternotomy  Cardiomediastinal silhouette appears unremarkable  The lungs are clear  No pneumothorax or pleural effusion  Partially included cervical spine fixation hardware  Impression: No acute cardiopulmonary disease  Workstation performed: [id number]     Stress strip    Result Date: 11/25/2022  Narrative: Confirmed by Desi Smith ([id number]),  Asmita Hartmann ([id number]) on 11/25/2022 2:33:02 PM    VAS renal artery complete    Result Date: 11/25/2022  Narrative:  [facility] REPORT CLINICAL: Indications: Patient presents to evaluate the renal arteries secondary to uncontrolled HTN  Patient is currently on 4 medications for Blood pressure  Operative History: CABG 2004 Risk Factors: The patient has history of ETOH ABuse, HTN, Diabetes (Yes), Hyperlipidemia, CAD and smoking (current) 1 5 ppd  FINDINGS:  Unilateral     PSV (cm/s)  EDV (cm/s)  Sup-Debora Ao             62              Celiac                 97          20  Px Inf-Bebeto Ao          69              Ds Inf-Bebeto Ao         108              Prox   SMA             143               Right         Impression  PSV (cm/s)  EDV (cm/s)   RAR    RI  Kidney (cm)  Ostial Renal                     100          32  1 62                     Prox Renal                       125          39  2 02                     Mid Renal                         83          25  1 34                     Dist Renal                        48          17  0 78                     Kidney                                                              11 83  Upper Pole                        18           6        0 69               Mid Pole                          18 6        0 68               Lower Pole                        18           6        0 68               Renal         Patent                                                        Left          Impression  PSV (cm/s)  EDV (cm/s)   RAR    RI  Kidney (cm)  Ostial Renal                      98          28  1 58                     Prox Renal                       130          45  2 10                     Mid Renal                        135          36  2 18                     Dist Renal                       102          32  1 64                     Kidney                                                              10 98  Upper Pole                        19           8        0 61               Mid Pole                          20           4        0 78               Lower Pole                        17           4        0 78               Renal         Patent                                                          CONCLUSION: Impression The abdominal aorta is widely patent and normal caliber  RIGHT RENAL: No evidence of significant arterial occlusive disease in the main renal artery  Patent renal vein Renovascular resistive index of 0 69  Renal/Aorta Ratio: 2 0  The Kidney measures 11 83 cm  LEFT RENAL: No evidence of significant arterial occlusive disease in the main renal artery  Patent renal vein Renovascular resistive index of 0 78  Renal/Aorta Ratio: 2 18  The Kidney measures 10 89cm  MESENTERIC: Celiac and superior mesenteric arteries are patent  SIGNATURE: Electronically Signed by: Raquel Baird DO on 2022-11-25 12:37:16 PM    Echo complete w/ contrast if indicated    Result Date: 11/25/2022  Narrative: •  Left Ventricle: Left ventricular cavity size is normal  Wall thickness is moderately increased  There is moderate to severe concentric hypertrophy  The left ventricular ejection fraction is 55% by biplane measurement   Systolic function is normal  Diastolic function is mildly abnormal, consistent with grade I (abnormal) relaxation  •  Right Ventricle: Systolic function is low normal  •  Left Atrium: The atrium is severely dilated (>48 mL/m2)  •  Grossly normal valve function without hemodynamically significant stenosis or regurgitation  NM Myocardial Perfusion Spect (Pharmacological Induced Stress and/or Rest)    Result Date: 11/25/2022  Narrative: •  Stress ECG: No ST deviation is noted  The ECG was not diagnostic due to pharmacological (vasodilator) stress  •  Perfusion: There are no perfusion defects  •  Stress Function: Left ventricular function post-stress is normal  Post-stress ejection fraction is 44 %  •  Stress Combined Conclusion: The ECG and SPECT imaging portions of the stress study are concordant with no evidence of stress induced myocardial ischemia

## 2023-01-10 ENCOUNTER — TELEPHONE (OUTPATIENT)
Dept: GASTROENTEROLOGY | Facility: CLINIC | Age: 63
End: 2023-01-10

## 2023-01-10 NOTE — TELEPHONE ENCOUNTER
lmom confirming pt's colonoscopy/egd scheduled on 1/17/23 at [facility] with Dr Karen Mata  Informed [facility] would be calling the day prior with the arrival time  Informed of clear liquid diet day prior as well as the bowel cleansing preparation  Informed would need a  the day of the procedure due to being under sedation  I asked pt to please call back if has not received instructions or if has any questions

## 2023-01-12 NOTE — TELEPHONE ENCOUNTER
I called and spoke to Memo Gamino  He is going to come into our [place] office on 1 13 23 to  paper prep instructions for Golytely

## 2023-01-12 NOTE — TELEPHONE ENCOUNTER
Per patient's phone call, he does not have the prep instructions, he would like a call back to discuss

## 2023-01-17 ENCOUNTER — HOSPITAL ENCOUNTER (OUTPATIENT)
Dept: GASTROENTEROLOGY | Facility: AMBULARY SURGERY CENTER | Age: 63
Setting detail: OUTPATIENT SURGERY
Discharge: HOME | End: 2023-01-17
Attending: INTERNAL MEDICINE

## 2023-01-17 ENCOUNTER — HOSPITAL ENCOUNTER (EMERGENCY)
Facility: HOSPITAL | Age: 63
Discharge: HOME | End: 2023-01-17
Attending: EMERGENCY MEDICINE

## 2023-01-17 ENCOUNTER — TELEPHONE (OUTPATIENT)
Dept: GASTROENTEROLOGY | Facility: CLINIC | Age: 63
End: 2023-01-17

## 2023-01-17 ENCOUNTER — APPOINTMENT (EMERGENCY)
Dept: RADIOLOGY | Facility: HOSPITAL | Age: 63
End: 2023-01-17

## 2023-01-17 VITALS
HEART RATE: 106 BPM | RESPIRATION RATE: 20 BRPM | DIASTOLIC BLOOD PRESSURE: 88 MMHG | TEMPERATURE: 96.7 F | OXYGEN SATURATION: 93 % | SYSTOLIC BLOOD PRESSURE: 165 MMHG

## 2023-01-17 VITALS
SYSTOLIC BLOOD PRESSURE: 214 MMHG | HEIGHT: 74 IN | HEART RATE: 115 BPM | DIASTOLIC BLOOD PRESSURE: 130 MMHG | OXYGEN SATURATION: 96 % | BODY MASS INDEX: 26.05 KG/M2 | RESPIRATION RATE: 20 BRPM | TEMPERATURE: 97.2 F | WEIGHT: 203 LBS

## 2023-01-17 DIAGNOSIS — J44.1 COPD EXACERBATION (HCC): ICD-10-CM

## 2023-01-17 DIAGNOSIS — F10.10 ALCOHOL ABUSE: ICD-10-CM

## 2023-01-17 DIAGNOSIS — J44.9 COPD (CHRONIC OBSTRUCTIVE PULMONARY DISEASE) (HCC): ICD-10-CM

## 2023-01-17 DIAGNOSIS — R13.10 DYSPHAGIA, UNSPECIFIED TYPE: ICD-10-CM

## 2023-01-17 DIAGNOSIS — I10 HYPERTENSION: ICD-10-CM

## 2023-01-17 DIAGNOSIS — Z72.0 TOBACCO ABUSE: ICD-10-CM

## 2023-01-17 DIAGNOSIS — Z12.11 ENCOUNTER FOR COLORECTAL CANCER SCREENING: ICD-10-CM

## 2023-01-17 DIAGNOSIS — F10.939 ALCOHOL WITHDRAWAL (HCC): Primary | ICD-10-CM

## 2023-01-17 DIAGNOSIS — Z12.12 ENCOUNTER FOR COLORECTAL CANCER SCREENING: ICD-10-CM

## 2023-01-17 LAB
ALBUMIN SERPL BCP-MCNC: 4.1 G/DL (ref 3.5–5)
ALP SERPL-CCNC: 89 U/L (ref 46–116)
ALT SERPL W P-5'-P-CCNC: 59 U/L (ref 12–78)
AMPHETAMINES SERPL QL SCN: NEGATIVE
ANION GAP SERPL CALCULATED.3IONS-SCNC: 11 MMOL/L (ref 4–13)
APTT PPP: 25 SECONDS (ref 23–37)
AST SERPL W P-5'-P-CCNC: 63 U/L (ref 5–45)
ATRIAL RATE: 107 BPM
BARBITURATES UR QL: NEGATIVE
BASOPHILS # BLD AUTO: 0.18 THOUSANDS/ÂΜL (ref 0–0.1)
BASOPHILS NFR BLD AUTO: 2 % (ref 0–1)
BENZODIAZ UR QL: NEGATIVE
BILIRUB SERPL-MCNC: 0.49 MG/DL (ref 0.2–1)
BUN SERPL-MCNC: 19 MG/DL (ref 5–25)
CALCIUM SERPL-MCNC: 9.2 MG/DL (ref 8.3–10.1)
CHLORIDE SERPL-SCNC: 100 MMOL/L (ref 96–108)
CO2 SERPL-SCNC: 27 MMOL/L (ref 21–32)
COCAINE UR QL: NEGATIVE
CREAT SERPL-MCNC: 1.04 MG/DL (ref 0.6–1.3)
EOSINOPHIL # BLD AUTO: 0.07 THOUSAND/ÂΜL (ref 0–0.61)
EOSINOPHIL NFR BLD AUTO: 1 % (ref 0–6)
ERYTHROCYTE [DISTWIDTH] IN BLOOD BY AUTOMATED COUNT: 13.8 % (ref 11.6–15.1)
ETHANOL SERPL-MCNC: <3 MG/DL (ref 0–3)
FLUAV RNA RESP QL NAA+PROBE: NEGATIVE
FLUBV RNA RESP QL NAA+PROBE: NEGATIVE
GFR SERPL CREATININE-BSD FRML MDRD: 77 ML/MIN/1.73SQ M
GLUCOSE SERPL-MCNC: 91 MG/DL (ref 65–140)
HCT VFR BLD AUTO: 46 % (ref 36.5–49.3)
HGB BLD-MCNC: 15.6 G/DL (ref 12–17)
IMM GRANULOCYTES # BLD AUTO: 0.02 THOUSAND/UL (ref 0–0.2)
IMM GRANULOCYTES NFR BLD AUTO: 0 % (ref 0–2)
INR PPP: 0.89 (ref 0.84–1.19)
LIPASE SERPL-CCNC: 174 U/L (ref 73–393)
LYMPHOCYTES # BLD AUTO: 1.18 THOUSANDS/ÂΜL (ref 0.6–4.47)
LYMPHOCYTES NFR BLD AUTO: 14 % (ref 14–44)
MAGNESIUM SERPL-MCNC: 1.7 MG/DL (ref 1.6–2.6)
MCH RBC QN AUTO: 33.1 PG (ref 26.8–34.3)
MCHC RBC AUTO-ENTMCNC: 33.9 G/DL (ref 31.4–37.4)
MCV RBC AUTO: 98 FL (ref 82–98)
METHADONE UR QL: NEGATIVE
MONOCYTES # BLD AUTO: 0.81 THOUSAND/ÂΜL (ref 0.17–1.22)
MONOCYTES NFR BLD AUTO: 10 % (ref 4–12)
NEUTROPHILS # BLD AUTO: 6.09 THOUSANDS/ÂΜL (ref 1.85–7.62)
NEUTS SEG NFR BLD AUTO: 73 % (ref 43–75)
NRBC BLD AUTO-RTO: 0 /100 WBCS
OPIATES UR QL SCN: NEGATIVE
OXYCODONE+OXYMORPHONE UR QL SCN: NEGATIVE
P AXIS: 75 DEGREES
PCP UR QL: NEGATIVE
PLATELET # BLD AUTO: 246 THOUSANDS/UL (ref 149–390)
PMV BLD AUTO: 9.5 FL (ref 8.9–12.7)
POTASSIUM SERPL-SCNC: 5.3 MMOL/L (ref 3.5–5.3)
PR INTERVAL: 118 MS
PROT SERPL-MCNC: 8.2 G/DL (ref 6.4–8.4)
PROTHROMBIN TIME: 12.1 SECONDS (ref 11.6–14.5)
QRS AXIS: 86 DEGREES
QRSD INTERVAL: 80 MS
QT INTERVAL: 324 MS
QTC INTERVAL: 432 MS
RBC # BLD AUTO: 4.71 MILLION/UL (ref 3.88–5.62)
RSV RNA RESP QL NAA+PROBE: NEGATIVE
SARS-COV-2 RNA RESP QL NAA+PROBE: NEGATIVE
SODIUM SERPL-SCNC: 138 MMOL/L (ref 135–147)
T WAVE AXIS: 52 DEGREES
THC UR QL: NEGATIVE
VENTRICULAR RATE: 107 BPM
WBC # BLD AUTO: 8.35 THOUSAND/UL (ref 4.31–10.16)

## 2023-01-17 RX ORDER — ALBUTEROL SULFATE 2.5 MG/3ML
2.5 SOLUTION RESPIRATORY (INHALATION) EVERY 6 HOURS PRN
Qty: 75 ML | Refills: 0 | Status: SHIPPED | OUTPATIENT
Start: 2023-01-17

## 2023-01-17 RX ORDER — ALBUTEROL SULFATE 90 UG/1
2 AEROSOL, METERED RESPIRATORY (INHALATION) EVERY 4 HOURS PRN
Qty: 18 G | Refills: 0 | Status: SHIPPED | OUTPATIENT
Start: 2023-01-17

## 2023-01-17 RX ORDER — PREDNISONE 50 MG/1
50 TABLET ORAL DAILY
Qty: 5 TABLET | Refills: 0 | Status: SHIPPED | OUTPATIENT
Start: 2023-01-17 | End: 2023-01-22

## 2023-01-17 RX ORDER — MAGNESIUM SULFATE HEPTAHYDRATE 40 MG/ML
2 INJECTION, SOLUTION INTRAVENOUS ONCE
Status: COMPLETED | OUTPATIENT
Start: 2023-01-17 | End: 2023-01-17

## 2023-01-17 RX ORDER — METHYLPREDNISOLONE SODIUM SUCCINATE 125 MG/2ML
60 INJECTION, POWDER, LYOPHILIZED, FOR SOLUTION INTRAMUSCULAR; INTRAVENOUS ONCE
Status: COMPLETED | OUTPATIENT
Start: 2023-01-17 | End: 2023-01-17

## 2023-01-17 RX ORDER — SODIUM CHLORIDE, SODIUM LACTATE, POTASSIUM CHLORIDE, CALCIUM CHLORIDE 600; 310; 30; 20 MG/100ML; MG/100ML; MG/100ML; MG/100ML
125 INJECTION, SOLUTION INTRAVENOUS CONTINUOUS
OUTPATIENT
Start: 2023-01-17

## 2023-01-17 RX ORDER — DIAZEPAM 5 MG/ML
10 INJECTION, SOLUTION INTRAMUSCULAR; INTRAVENOUS ONCE
Status: COMPLETED | OUTPATIENT
Start: 2023-01-17 | End: 2023-01-17

## 2023-01-17 RX ORDER — CHLORDIAZEPOXIDE HYDROCHLORIDE 25 MG/1
50 CAPSULE, GELATIN COATED ORAL 3 TIMES DAILY PRN
Qty: 12 CAPSULE | Refills: 0 | Status: SHIPPED | OUTPATIENT
Start: 2023-01-17 | End: 2023-01-27 | Stop reason: ALTCHOICE

## 2023-01-17 RX ADMIN — THIAMINE HYDROCHLORIDE 100 MG: 100 INJECTION, SOLUTION INTRAMUSCULAR; INTRAVENOUS at 12:46

## 2023-01-17 RX ADMIN — MAGNESIUM SULFATE HEPTAHYDRATE 2 G: 40 INJECTION, SOLUTION INTRAVENOUS at 13:15

## 2023-01-17 RX ADMIN — FOLIC ACID 1 MG: 5 INJECTION, SOLUTION INTRAMUSCULAR; INTRAVENOUS; SUBCUTANEOUS at 12:46

## 2023-01-17 RX ADMIN — METHYLPREDNISOLONE SODIUM SUCCINATE 60 MG: 125 INJECTION, POWDER, FOR SOLUTION INTRAMUSCULAR; INTRAVENOUS at 14:42

## 2023-01-17 RX ADMIN — DIAZEPAM 10 MG: 10 INJECTION, SOLUTION INTRAMUSCULAR; INTRAVENOUS at 12:33

## 2023-01-17 RX ADMIN — ALBUTEROL SULFATE 5 MG: 2.5 SOLUTION RESPIRATORY (INHALATION) at 14:44

## 2023-01-17 RX ADMIN — IPRATROPIUM BROMIDE 0.5 MG: 0.5 SOLUTION RESPIRATORY (INHALATION) at 14:44

## 2023-01-17 RX ADMIN — SODIUM CHLORIDE 1000 ML: 0.9 INJECTION, SOLUTION INTRAVENOUS at 12:34

## 2023-01-17 NOTE — ED PROVIDER NOTES
History  Chief Complaint   Patient presents with   • Hypertension     Pt sent from Dr Salena Ibarra for hypertensive episode, concerned the pt may be in withdrawal from alcohol     60-year-old male with past history of hypertension, hyperlipidemia, COPD, CAD, CABG x1, tobacco abuse, alcohol abuse, prostate cancer, presents to the ED from his GI doctor's office for evaluation of hypertension and alcohol withdrawal   Patient states that he was scheduled for an outpatient colonoscopy today  Patient states that he used to drink on a daily basis however recently he did stop  Patient is supposed to be in an outpatient rehab program   He missed a few of his appointments in the last few weeks  Patient went binge drinking last night  He drank six 12 ounce cans of beers as well as a pint of vodka last night as he relapsed  He currently does not have any complaints  When he went for his colonoscopy appointment he was found to be shaking with elevated blood pressure  Patient subsequently sent to the ED for further evaluation  Patient has no other complaints at this time  Patient denies any previous admissions for alcohol withdrawal   Patient denies any alcohol withdrawal seizures or delirium tremens  Patient denies any chest pain or shortness of breath  Patient is currently alert and oriented x3  Patient does not want any inpatient detox  History provided by:  Patient  Hypertension  Associated symptoms: no abdominal pain, no chest pain, no dizziness, no fatigue, no fever, no headaches, no nausea, no shortness of breath, not vomiting and no weakness        Prior to Admission Medications   Prescriptions Last Dose Informant Patient Reported? Taking? Blood Glucose Monitoring Suppl (ONE TOUCH ULTRA MINI) w/Device KIT   Yes No   Sig: Use as directed   Breo Ellipta 200-25 MCG/ACT inhaler   No No   Sig: INHALE 1 PUFF DAILY RINSE MOUTH AFTER USE     ONETOUCH DELICA LANCETS FINE MISC   Yes No   Sig: 3 (three) times a day Test   Thiamine HCl (vitamin B-1) 100 MG TABS   No No   Sig: TAKE 1 TABLET DAILY   albuterol (Ventolin HFA) 90 mcg/act inhaler   No No   Sig: Inhale 2 puffs every 4 (four) hours as needed for wheezing   albuterol (Ventolin HFA) 90 mcg/act inhaler   No Yes   Sig: Inhale 2 puffs every 4 (four) hours as needed for wheezing   carvedilol (COREG) 6 25 mg tablet   No No   Sig: Take 1 tablet (6 25 mg total) by mouth 2 (two) times a day with meals   ferrous sulfate 325 (65 Fe) mg tablet   No No   Sig: Take 1 tablet (325 mg total) by mouth 2 (two) times a day with meals   folic acid (FOLVITE) 1 mg tablet   No No   Sig: Take 1 tablet (1,000 mcg total) by mouth daily   gabapentin (NEURONTIN) 300 mg capsule   No No   Sig: TAKE ONE CAPSULE THREE TIMES DAILY   lisinopril (ZESTRIL) 20 mg tablet   No No   Sig: Take 1 tablet (20 mg total) by mouth daily   magnesium Oxide (MAG-OX) 400 mg TABS   No No   Sig: Take 1 tablet (400 mg total) by mouth 2 (two) times a day   metFORMIN (GLUCOPHAGE) 1000 MG tablet   No No   Sig: Take 1 tablet (1,000 mg total) by mouth daily with breakfast   pantoprazole (PROTONIX) 40 mg tablet   No No   Sig: Take 1 tablet (40 mg total) by mouth daily in the early morning Do not start before November 26, 2022     Patient taking differently: Take 40 mg by mouth 2 (two) times a day as needed   pravastatin (PRAVACHOL) 40 mg tablet   No No   Sig: Take 1 tablet (40 mg total) by mouth daily with dinner   prednisoLONE acetate (PRED FORTE) 1 % ophthalmic suspension   Yes No   ranolazine (RANEXA) 500 mg 12 hr tablet   No No   Sig: Take 1 tablet (500 mg total) by mouth every 12 (twelve) hours   tamsulosin (FLOMAX) 0 4 mg   No No   Sig: TAKE 1 CAPSULE DAILY      Facility-Administered Medications: None       Past Medical History:   Diagnosis Date   • Acute on chronic kidney failure (HCC)    • Alcohol withdrawal ([facility] ) 6/7/2019   • Cancer ([facility] )     prostate ca,had radiation   • Cardiac disease     stents,then triple bypass   • COPD (chronic obstructive pulmonary disease) (HCC)    • ETOH abuse    • Hx of heart artery stent     2014   • Hyperlipidemia    • Hypertension    • Hypovolemic shock ([facility] ) 12/22/2019   • Lumbar spondylitis ([facility] ) 10/13/2022   • Nasal bone fracture 10/10/2022   • Prostate CA ([facility] )    • S/P CABG x 1     2004       Past Surgical History:   Procedure Laterality Date   • CORONARY ARTERY BYPASS GRAFT  2004   • [place] ARTHRD ANT INTERBODY  [address] CRV BELOW C2 N/A 12/16/2020    Procedure: Anterior cervical discectomy with fusion C4-C7; Posterior cervical decompression and fusion C2-T2;  Surgeon: Harman Kerr MD;  Location: [facility];  Service: Neurosurgery   • TONSILLECTOMY         Family History   Problem Relation Age of Onset   • Diabetes Mother    • Uterine cancer Mother    • COPD Father    • Hypertension Father      I have reviewed and agree with the history as documented  E-Cigarette/Vaping   • E-Cigarette Use Never User      E-Cigarette/Vaping Substances   • Nicotine Yes    • THC No    • CBD No    • Flavoring No    • Other No    • Unknown No      Social History     Tobacco Use   • Smoking status: Every Day     Packs/day: 1 00     Years: 40 00     Pack years: 40 00     Types: Cigarettes   • Smokeless tobacco: Never   Vaping Use   • Vaping Use: Never used   Substance Use Topics   • Alcohol use: Yes     Alcohol/week: 4 0 standard drinks     Types: 4 Standard drinks or equivalent per week     Comment: daily   • Drug use: No       Review of Systems   Constitutional: Negative for activity change, fatigue and fever  HENT: Negative for congestion, ear discharge and sore throat  Eyes: Negative for pain and redness  Respiratory: Negative for cough, chest tightness, shortness of breath and wheezing  Cardiovascular: Negative for chest pain  Gastrointestinal: Negative for abdominal pain, diarrhea, nausea and vomiting  Endocrine: Negative for cold intolerance  Genitourinary: Negative for dysuria and urgency  Musculoskeletal: Negative for arthralgias and back pain  Neurological: Negative for dizziness, weakness and headaches  Psychiatric/Behavioral: Negative for agitation and behavioral problems  Physical Exam  Physical Exam  Vitals and nursing note reviewed  Constitutional:       General: He is not in acute distress  Appearance: He is well-developed  Comments: Patient noted to have tremors on movement  HENT:      Head: Normocephalic and atraumatic  Eyes:      Conjunctiva/sclera: Conjunctivae normal    Cardiovascular:      Rate and Rhythm: Normal rate and regular rhythm  Heart sounds: No murmur heard  Pulmonary:      Effort: Pulmonary effort is normal  No respiratory distress  Breath sounds: Wheezing present  Comments: Diffuse inspiratory and expiratory wheezing noted bilaterally  Abdominal:      Palpations: Abdomen is soft  Tenderness: There is no abdominal tenderness  Musculoskeletal:         General: No swelling  Cervical back: Neck supple  Skin:     General: Skin is warm and dry  Capillary Refill: Capillary refill takes less than 2 seconds  Neurological:      Mental Status: He is alert     Psychiatric:         Mood and Affect: Mood normal          Vital Signs  ED Triage Vitals [01/17/23 1158]   Temperature Pulse Respirations Blood Pressure SpO2   (!) 96 7 °F (35 9 °C) (!) 110 18 (!) 224/131 92 %      Temp Source Heart Rate Source Patient Position - Orthostatic VS BP Location FiO2 (%)   Tympanic Monitor Lying Left arm --      Pain Score       --           Vitals:    01/17/23 1158 01/17/23 1315 01/17/23 1330 01/17/23 1430   BP: (!) 224/131 (!) 186/100 (!) 177/83 165/88   Pulse: (!) 110 88 90 (!) 106   Patient Position - Orthostatic VS: Lying            Visual Acuity      ED Medications  Medications   sodium chloride 0 9 % bolus 1,000 mL (0 mL Intravenous Stopped 1/17/23 1529)   magnesium sulfate 2 g/50 mL IVPB (premix) 2 g (0 g Intravenous Stopped 1/17/23 7578)   thiamine (VITAMIN B1) 100 mg in sodium chloride 0 9 % 50 mL IVPB (0 mg Intravenous Stopped 1/14/41 0317)   folic acid 1 mg in sodium chloride 0 9 % 50 mL IVPB (0 mg Intravenous Stopped 1/17/23 1320)   diazepam (VALIUM) injection 10 mg (10 mg Intravenous Given 1/17/23 1233)   ipratropium (ATROVENT) 0 02 % inhalation solution 0 5 mg (0 5 mg Nebulization Given 1/17/23 1444)   albuterol inhalation solution 5 mg (5 mg Nebulization Given 1/17/23 1444)   methylPREDNISolone sodium succinate (Solu-MEDROL) injection 60 mg (60 mg Intravenous Given 1/17/23 1442)       Diagnostic Studies  Results Reviewed     Procedure Component Value Units Date/Time    Rapid drug screen, urine [[id number]]  (Normal) Collected: 01/17/23 1430    Lab Status: Final result Specimen: Urine, Clean Catch Updated: 01/17/23 1454     Amph/Meth UR Negative     Barbiturate Ur Negative     Benzodiazepine Urine Negative     Cocaine Urine Negative     Methadone Urine Negative     Opiate Urine Negative     PCP Ur Negative     THC Urine Negative     Oxycodone Urine Negative    Narrative:      FOR MEDICAL PURPOSES ONLY  IF CONFIRMATION NEEDED PLEASE CONTACT THE LAB WITHIN 5 DAYS  Drug Screen Cutoff Levels:  AMPHETAMINE/METHAMPHETAMINES  1000 ng/mL  BARBITURATES     200 ng/mL  BENZODIAZEPINES     200 ng/mL  COCAINE      300 ng/mL  METHADONE      300 ng/mL  OPIATES      300 ng/mL  PHENCYCLIDINE     25 ng/mL  THC       50 ng/mL  OXYCODONE      100 ng/mL    FLU/RSV/COVID - if FLU/RSV clinically relevant [[id number]]  (Normal) Collected: 01/17/23 1233    Lab Status: Final result Specimen: Nares from Nose Updated: 01/17/23 1323     SARS-CoV-2 Negative     INFLUENZA A PCR Negative     INFLUENZA B PCR Negative     RSV PCR Negative    Narrative:      FOR PEDIATRIC PATIENTS - copy/paste COVID Guidelines URL to browser: [email or web address]    SARS-CoV-2 assay is a Nucleic Acid Amplification assay intended for the  qualitative detection of nucleic acid from SARS-CoV-2 in nasopharyngeal  swabs  Results are for the presumptive identification of SARS-CoV-2 RNA  Positive results are indicative of infection with SARS-CoV-2, the virus  causing COVID-19, but do not rule out bacterial infection or co-infection  with other viruses  Laboratories within the [place] and its  territories are required to report all positive results to the appropriate  public health authorities  Negative results do not preclude SARS-CoV-2  infection and should not be used as the sole basis for treatment or other  patient management decisions  Negative results must be combined with  clinical observations, patient history, and epidemiological information  This test has not been FDA cleared or approved  This test has been authorized by FDA under an Emergency Use Authorization  (EUA)  This test is only authorized for the duration of time the  declaration that circumstances exist justifying the authorization of the  emergency use of an in vitro diagnostic tests for detection of SARS-CoV-2  virus and/or diagnosis of COVID-19 infection under section 564(b)(1) of  the Act, 21 U  S C  275LJV-4(J)(0), unless the authorization is terminated  or revoked sooner  The test has been validated but independent review by FDA  and CLIA is pending  Test performed using [employer] GeneXpert: This RT-PCR assay targets N2,  a region unique to SARS-CoV-2  A conserved region in the E-gene was chosen  for pan-Sarbecovirus detection which includes SARS-CoV-2  According to CMS-2020-01-R, this platform meets the definition of high-throughput technology      Ethanol [[id number]]  (Normal) Collected: 01/17/23 1233    Lab Status: Final result Specimen: Blood from Arm, Left Updated: 01/17/23 1301     Ethanol Lvl <3 mg/dL     Lipase [[id number]]  (Normal) Collected: 01/17/23 1233    Lab Status: Final result Specimen: Blood from Arm, Left Updated: 01/17/23 1259     Lipase 174 u/L     Comprehensive metabolic panel [[id number]]  (Abnormal) Collected: 01/17/23 1233    Lab Status: Final result Specimen: Blood from Arm, Left Updated: 01/17/23 1259     Sodium 138 mmol/L      Potassium 5 3 mmol/L      Chloride 100 mmol/L      CO2 27 mmol/L      ANION GAP 11 mmol/L      BUN 19 mg/dL      Creatinine 1 04 mg/dL      Glucose 91 mg/dL      Calcium 9 2 mg/dL      AST 63 U/L      ALT 59 U/L      Alkaline Phosphatase 89 U/L      Total Protein 8 2 g/dL      Albumin 4 1 g/dL      Total Bilirubin 0 49 mg/dL      eGFR 77 ml/min/1 73sq m     Narrative:      [place] guidelines for Chronic Kidney Disease (CKD):   •  Stage 1 with normal or high GFR (GFR > 90 mL/min/1 73 square meters)  •  Stage 2 Mild CKD (GFR = 60-89 mL/min/1 73 square meters)  •  Stage 3A Moderate CKD (GFR = 45-59 mL/min/1 73 square meters)  •  Stage 3B Moderate CKD (GFR = 30-44 mL/min/1 73 square meters)  •  Stage 4 Severe CKD (GFR = 15-29 mL/min/1 73 square meters)  •  Stage 5 End Stage CKD (GFR <15 mL/min/1 73 square meters)  Note: GFR calculation is accurate only with a steady state creatinine    Magnesium [[id number]]  (Normal) Collected: 01/17/23 1233    Lab Status: Final result Specimen: Blood from Arm, Left Updated: 01/17/23 1259     Magnesium 1 7 mg/dL     Protime-INR [[id number]]  (Normal) Collected: 01/17/23 1233    Lab Status: Final result Specimen: Blood from Arm, Left Updated: 01/17/23 1254     Protime 12 1 seconds      INR 0 89    APTT [[id number]]  (Normal) Collected: 01/17/23 1233    Lab Status: Final result Specimen: Blood from Arm, Left Updated: 01/17/23 1254     PTT 25 seconds     CBC and differential [[id number]]  (Abnormal) Collected: 01/17/23 1233    Lab Status: Final result Specimen: Blood from Arm, Left Updated: 01/17/23 1243     WBC 8 35 Thousand/uL      RBC 4 71 Million/uL      Hemoglobin 15 6 g/dL      Hematocrit 46 0 %      MCV 98 fL      MCH 33 1 pg      MCHC 33 9 g/dL      RDW 13 8 % MPV 9 5 fL      Platelets 097 Thousands/uL      nRBC 0 /100 WBCs      Neutrophils Relative 73 %      Immat GRANS % 0 %      Lymphocytes Relative 14 %      Monocytes Relative 10 %      Eosinophils Relative 1 %      Basophils Relative 2 %      Neutrophils Absolute 6 09 Thousands/µL      Immature Grans Absolute 0 02 Thousand/uL      Lymphocytes Absolute 1 18 Thousands/µL      Monocytes Absolute 0 81 Thousand/µL      Eosinophils Absolute 0 07 Thousand/µL      Basophils Absolute 0 18 Thousands/µL                  XR chest 2 views   Final Result by Justyn Ramirez MD (01/17 8805)      No acute cardiopulmonary disease  Workstation performed: [id number]                    Procedures  ECG 12 Lead Documentation Only    Date/Time: 1/17/2023 12:17 PM  Performed by: Parviz Bennett DO  Authorized by: Parviz Bennett DO     Indications / Diagnosis:  Alcohol withdrawal  ECG reviewed by me, the ED Provider: yes    Patient location:  ED  Previous ECG:     Previous ECG:  Compared to current    Similarity:  Changes noted    Comparison to cardiac monitor: Yes    Interpretation:     Interpretation: abnormal    Comments:      Sinus rhythm, rate 107, normal axis, normal intervals, no acute ST/T wave noise noted, wide P waves noted suggesting possible left atrial enlargement, essentially unchanged EKG from baseline except for sinus tachycardia      CriticalCare Time  Performed by: Parviz Benentt DO  Authorized by: Parviz Bennett DO     Critical care provider statement:     Critical care time (minutes):  30    Critical care time was exclusive of:  Separately billable procedures and treating other patients    Critical care was necessary to treat or prevent imminent or life-threatening deterioration of the following conditions:  Metabolic crisis    Critical care was time spent personally by me on the following activities:  Blood draw for specimens, obtaining history from patient or surrogate, development of treatment plan with patient or surrogate, discussions with consultants, evaluation of patient's response to treatment, examination of patient, review of old charts, re-evaluation of patient's condition, ordering and review of laboratory studies, ordering and review of radiographic studies, interpretation of cardiac output measurements and ordering and performing treatments and interventions  Comments:      Alcohol withdrawal             ED Course  ED Course as of 01/17/23 1927   Tue Jan 17, 2023   1641 Patient reexamined at bedside  Patient is feeling much better  Patient is requesting to go home  Patient is followed by Cardiologist as well as his PCP  Patient is requesting refills for his neb solution at home  His breathing is significantly improved  Patient will be given Librium to go home with for his alcohol withdrawal symptoms  Patient states that he is currently in an outpatient program for alcohol abuse  Patient does not want any other resources or inpatient detox program from us today  She will be referred to a pulmonologist   Patient is a current daily smoker with history of COPD  Patient is currently working on quitting smoking  Patient has medications prescribed by his cardiologist to help with smoking cessation  Medical Decision Making  Obtain blood work, EKG, blood alcohol level, chest x-ray  Give banana bag, Valium, and continue to monitor patient for any worsening symptoms  Give steroids, neb treatment and reassess patient's wheezing    Patient was initially hypertensive and tachycardic  Blood pressure improved significantly with control of withdrawal symptoms  Heart rate improved as well  Patient felt significantly better after steroids and neb treatment  At this time patient's symptoms were consistent with alcohol withdrawal as well as COPD exacerbation  I offered inpatient alcohol detox for patient however he refused    Patient states that he is currently enrolled in an outpatient alcohol detox program   Patient continues to be a daily smoker  I discussed smoking cessation with patient  Patient states that he does have medications to take at home to help him stop smoking  At this time patient placed on prednisone burst   Patient's albuterol inhaler and neb solution was refilled  Patient referred to pulmonology for further evaluation and management  Patient also referred to his cardiologist for further evaluation and management of his hypertension  Patient's hypertension today was most likely secondary to alcohol withdrawal symptoms  Patient given prescription for Librium as needed anxiety and withdrawal symptoms at home  Patient has a history of diabetes  Patient told to monitor his blood glucose level as steroid may increase blood glucose level  Patient should call his PCP and follow-up in 2 days  At this time patient is discharged home with follow-up to PCP, cardiology, pulmonology as well as his outpatient alcohol detox program   Close return instructions given to return to the ER for any worsening symptoms  Patient agrees with discharge plan  Patient well appearing at time of discharge  Please Note: Fluency Direct voice recognition software may have been used in the creation of this document  Wrong words or sound a like substitutions may have occurred due to the inherent limitations of the voice software  Alcohol abuse: self-limited or minor problem  Alcohol withdrawal ([facility] ): complicated acute illness or injury  COPD (chronic obstructive pulmonary disease) ([facility] ): self-limited or minor problem  COPD exacerbation ([facility] ): complicated acute illness or injury  Hypertension: complicated acute illness or injury  Tobacco abuse: complicated acute illness or injury  Amount and/or Complexity of Data Reviewed  Labs: ordered  Radiology: ordered  Risk  Prescription drug management            Disposition  Final diagnoses:   Alcohol withdrawal ([facility] )   COPD exacerbation ([facility] )   Hypertension   Tobacco abuse   Alcohol abuse   COPD (chronic obstructive pulmonary disease) ([facility] )     Time reflects when diagnosis was documented in both MDM as applicable and the Disposition within this note     Time User Action Codes Description Comment    1/17/2023  4:44 PM Carissa Morn Add [F10 939] Alcohol withdrawal ([facility] )     1/17/2023  4:44 PM Carissa Morn Add [J44 1] COPD exacerbation ([facility] )     1/17/2023  4:45 PM Ferdous, Adnk Italo Add [I10] Hypertension     1/17/2023  4:45 PM Ferdous, Dank Italo Add [Z72 0] Tobacco abuse     1/17/2023  4:45 PM Carissa Morn Add [F10 10] Alcohol abuse     1/17/2023  4:47 PM Ferdous, Dank Italo Add [J44 9] COPD (chronic obstructive pulmonary disease) [facility])       ED Disposition     ED Disposition   Discharge    Condition   Stable    Date/Time   Tue Jan 17, 2023  4:43 PM    Comment   Michael Marcelo discharge to home/self care  Follow-up Information     Follow up With Specialties Details Why [address]  Family Medicine Schedule an appointment as soon as possible for a visit in 2 days Steroid therapy will increase your blood sugar level  Please monitor your blood sugar level and discuss any episodes of high blood glucose level with your family doctor  Giovanni Lema [id number]  [address] Miguel Murrell DO Cardiology Schedule an appointment as soon as possible for a visit  For further evaluation of your high blood pressure [address]      Bucky Saunders DO Pulmonology, Critical Care Medicine, Neurology, Pulmonary Disease Schedule an appointment as soon as possible for a visit  For further evaluation of your COPD as well as tobacco abuse   [address] [place] [address]  [phone]            Discharge Medication List as of 1/17/2023  4:50 PM      START taking these medications Details   albuterol (2 5 mg/3 mL) 0 083 % nebulizer solution Take 3 mL (2 5 mg total) by nebulization every 6 (six) hours as needed for wheezing or shortness of breath, Starting Tue 1/17/2023, Normal      chlordiazePOXIDE (LIBRIUM) 25 mg capsule Take 2 capsules (50 mg total) by mouth 3 (three) times a day as needed for withdrawal, Starting Tue 1/17/2023, Normal      predniSONE 50 mg tablet Take 1 tablet (50 mg total) by mouth daily for 5 days, Starting Tue 1/17/2023, Until Sun 1/22/2023, Normal         CONTINUE these medications which have CHANGED    Details   albuterol (Ventolin HFA) 90 mcg/act inhaler Inhale 2 puffs every 4 (four) hours as needed for wheezing, Starting Tue 1/17/2023, Normal         CONTINUE these medications which have NOT CHANGED    Details   Blood Glucose Monitoring Suppl (ONE TOUCH ULTRA MINI) w/Device KIT Use as directed, Starting Thu 5/16/2019, Historical Med      Breo Ellipta 200-25 MCG/ACT inhaler INHALE 1 PUFF DAILY RINSE MOUTH AFTER USE , Normal      carvedilol (COREG) 6 25 mg tablet Take 1 tablet (6 25 mg total) by mouth 2 (two) times a day with meals, Starting Fri 11/25/2022, Normal      ferrous sulfate 325 (65 Fe) mg tablet Take 1 tablet (325 mg total) by mouth 2 (two) times a day with meals, Starting Mon 11/28/2022, Until Tue 7/22/8178, Normal      folic acid (FOLVITE) 1 mg tablet Take 1 tablet (1,000 mcg total) by mouth daily, Starting Thu 6/9/2022, Normal      gabapentin (NEURONTIN) 300 mg capsule TAKE ONE CAPSULE THREE TIMES DAILY, Normal      lisinopril (ZESTRIL) 20 mg tablet Take 1 tablet (20 mg total) by mouth daily, Starting Fri 11/25/2022, Normal      magnesium Oxide (MAG-OX) 400 mg TABS Take 1 tablet (400 mg total) by mouth 2 (two) times a day, Starting Thu 6/9/2022, Normal      metFORMIN (GLUCOPHAGE) 1000 MG tablet Take 1 tablet (1,000 mg total) by mouth daily with breakfast, Starting Mon 10/17/2022, Normal      ONETOUCH DELICA LANCETS FINE MISC 3 (three) times a day Test, Starting Thu 5/16/2019, Historical Med      pantoprazole (PROTONIX) 40 mg tablet Take 1 tablet (40 mg total) by mouth daily in the early morning Do not start before November 26, 2022 , Starting Sat 11/26/2022, Normal      pravastatin (PRAVACHOL) 40 mg tablet Take 1 tablet (40 mg total) by mouth daily with dinner, Starting Thu 6/9/2022, Normal      prednisoLONE acetate (PRED FORTE) 1 % ophthalmic suspension Starting Tue 11/15/2022, Historical Med      ranolazine (RANEXA) 500 mg 12 hr tablet Take 1 tablet (500 mg total) by mouth every 12 (twelve) hours, Starting Mon 6/27/2022, Normal      tamsulosin (FLOMAX) 0 4 mg TAKE 1 CAPSULE DAILY, Normal      Thiamine HCl (vitamin B-1) 100 MG TABS TAKE 1 TABLET DAILY, Normal             No discharge procedures on file      PDMP Review       Value Time User    PDMP Reviewed  Yes 7/26/2021  4:50 PM Cecilio Del Rio MD          ED Provider  Electronically Signed by           Isabella Child DO  01/17/23 1928

## 2023-01-17 NOTE — TELEPHONE ENCOUNTER
Per Angie Villegas at [facility], pt needs to be rescheduled for his colonoscopy and EGD due to having high blood pressure and being sent to ER  Will call pt to reschedule

## 2023-01-20 NOTE — TELEPHONE ENCOUNTER
Pt is scheduled for an appt next Friday 1/27/23 with his pcp to discuss BP medications and also has an appt with Cardiologist Dr Juan No on 2/10/23  Pt said that he would call back to reschedule once he sees his pcp next week  If do not hear back from pt in two weeks, will call him to try to reschedule

## 2023-01-23 DIAGNOSIS — F10.10 ALCOHOL ABUSE: Chronic | ICD-10-CM

## 2023-01-23 DIAGNOSIS — G95.19 EDEMA, SPINAL CORD (HCC): ICD-10-CM

## 2023-01-23 RX ORDER — FOLIC ACID 1 MG/1
TABLET ORAL
Qty: 90 TABLET | Refills: 2 | Status: SHIPPED | OUTPATIENT
Start: 2023-01-23

## 2023-01-23 RX ORDER — GABAPENTIN 300 MG/1
CAPSULE ORAL
Qty: 90 CAPSULE | Refills: 1 | Status: SHIPPED | OUTPATIENT
Start: 2023-01-23

## 2023-01-26 NOTE — PROGRESS NOTES
Name: Pradeep Blunt      : 1960      MRN: [id number]  Encounter Provider: Ronny Hamilton MD  Encounter Date: 2023   Encounter department: [facility]     1  Type 2 diabetes mellitus with hyperosmolarity without coma, without long-term current use of insulin ([facility])  -     POCT hemoglobin A1c  -     metFORMIN (GLUCOPHAGE) 500 mg tablet; Take 1 tablet (500 mg total) by mouth daily with breakfast  -     Urine Microalbumin/creatinine ratio; Future; Expected date: 2023  -     HEMOGLOBIN A1C W/ EAG ESTIMATION; Future; Expected date: 2023    2  Dyslipidemia  -     Lipid Panel with Direct LDL reflex; Future; Expected date: 2023    3  Chronic obstructive pulmonary disease, unspecified COPD type ([facility] )  -     Ambulatory Referral to Pulmonology; Future      Patient's diabetes has been well controlled, point-of-care A1c was 5 4  Diabetic foot exam completed today significant for loss of sensation in left big toe but this may be attributed to callus on the toe  Patient's metformin decreased from 1000 mg daily to 500 mg daily  We will follow-up A1c in 3 months  Patient follows up with cardiology for chronic heart failure with preserved ejection fracture, CAD, and history of A  fib  Patient has history of COPD currently on Breo Ellipta inhaler as well as albuterol as needed  Patient states he is needing his nebulizer every night  Patient was recommended referral to pulmonology in the past but did not follow-up, placed new referral to pulmonology  Subjective      Diabetes  He presents for his follow-up diabetic visit  He has type 2 diabetes mellitus  There are no hypoglycemic associated symptoms  Pertinent negatives for hypoglycemia include no dizziness or headaches  There are no diabetic associated symptoms  Pertinent negatives for diabetes include no chest pain  There are no hypoglycemic complications  Symptoms are stable   There are no diabetic complications  Risk factors for coronary artery disease include male sex and obesity  When asked about current treatments, none were reported  He is compliant with treatment all of the time  Review of Systems   Constitutional: Negative for chills and fever  HENT: Negative for sore throat  Respiratory: Negative for cough and shortness of breath  Cardiovascular: Negative for chest pain and palpitations  Gastrointestinal: Negative for abdominal pain, constipation, diarrhea, nausea and vomiting  Genitourinary: Negative for difficulty urinating and dysuria  Neurological: Negative for dizziness and headaches  Current Outpatient Medications on File Prior to Visit   Medication Sig   • albuterol (2 5 mg/3 mL) 0 083 % nebulizer solution Take 3 mL (2 5 mg total) by nebulization every 6 (six) hours as needed for wheezing or shortness of breath   • albuterol (Ventolin HFA) 90 mcg/act inhaler Inhale 2 puffs every 4 (four) hours as needed for wheezing   • Blood Glucose Monitoring Suppl (ONE TOUCH ULTRA MINI) w/Device KIT Use as directed   • Breo Ellipta 200-25 MCG/ACT inhaler INHALE 1 PUFF DAILY RINSE MOUTH AFTER USE  • carvedilol (COREG) 6 25 mg tablet Take 1 tablet (6 25 mg total) by mouth 2 (two) times a day with meals   • chlordiazePOXIDE (LIBRIUM) 25 mg capsule Take 2 capsules (50 mg total) by mouth 3 (three) times a day as needed for withdrawal   • folic acid (FOLVITE) 1 mg tablet TAKE 1 TABLET DAILY   • gabapentin (NEURONTIN) 300 mg capsule TAKE ONE CAPSULE THREE TIMES DAILY   • lisinopril (ZESTRIL) 20 mg tablet Take 1 tablet (20 mg total) by mouth daily   • magnesium Oxide (MAG-OX) 400 mg TABS Take 1 tablet (400 mg total) by mouth 2 (two) times a day   • ONETOUCH DELICA LANCETS FINE MISC 3 (three) times a day Test   • pantoprazole (PROTONIX) 40 mg tablet Take 1 tablet (40 mg total) by mouth daily in the early morning Do not start before November 26, 2022   (Patient taking differently: Take 40 mg by mouth 2 (two) times a day as needed)   • pravastatin (PRAVACHOL) 40 mg tablet Take 1 tablet (40 mg total) by mouth daily with dinner   • prednisoLONE acetate (PRED FORTE) 1 % ophthalmic suspension    • ranolazine (RANEXA) 500 mg 12 hr tablet Take 1 tablet (500 mg total) by mouth every 12 (twelve) hours   • tamsulosin (FLOMAX) 0 4 mg TAKE 1 CAPSULE DAILY   • Thiamine HCl (vitamin B-1) 100 MG TABS TAKE 1 TABLET DAILY   • ferrous sulfate 325 (65 Fe) mg tablet Take 1 tablet (325 mg total) by mouth 2 (two) times a day with meals   • [DISCONTINUED] metFORMIN (GLUCOPHAGE) 1000 MG tablet Take 1 tablet (1,000 mg total) by mouth daily with breakfast       Objective     /60 (BP Location: Left arm, Patient Position: Sitting, Cuff Size: Standard)   Pulse 68   Resp 16   Ht 6' 2" (1 88 m)   Wt 94 6 kg (208 lb 8 oz)   SpO2 95%   BMI 26 77 kg/m²     Physical Exam  Constitutional:       Appearance: Normal appearance  HENT:      Head: Normocephalic and atraumatic  Cardiovascular:      Rate and Rhythm: Normal rate and regular rhythm  Pulses: no weak pulses     Heart sounds: Normal heart sounds  No murmur heard  Pulmonary:      Breath sounds: Normal breath sounds  No wheezing  Abdominal:      Palpations: Abdomen is soft  Tenderness: There is no abdominal tenderness  Musculoskeletal:      Right lower leg: No edema  Left lower leg: No edema  Feet:    Feet:      Right foot: amputated     Skin integrity: No ulcer, skin breakdown, erythema, warmth, callus or dry skin  Left foot: amputated     Skin integrity: Callus (Right big toe) present  No ulcer, skin breakdown, erythema, warmth or dry skin  Neurological:      Mental Status: He is alert  Patient's shoes and socks were not removed  Right Foot/Ankle   Right Foot Inspection  Skin Exam: skin normal and skin intact   No dry skin, no warmth, no callus, no erythema, no maceration, no abnormal color, no pre-ulcer, no ulcer and no callus  Amputation: amputation right foot     Toe Exam: ROM and strength within normal limits  Sensory   Monofilament testing: intact    Left Foot/Ankle  Left Foot Inspection  Skin Exam: skin normal, skin intact and callus (Right big toe)  No dry skin, no warmth, no erythema, no maceration, normal color, no pre-ulcer and no ulcer  Amputation: amputation left foot     Toe Exam: ROM and strength within normal limits       Sensory   Monofilament testing: intact    Assign Risk Category  No deformity present  No loss of protective sensation  No weak pulses  Risk: 0          Maurice Ghosh MD

## 2023-01-27 ENCOUNTER — OFFICE VISIT (OUTPATIENT)
Dept: FAMILY MEDICINE CLINIC | Facility: CLINIC | Age: 63
End: 2023-01-27

## 2023-01-27 ENCOUNTER — TELEPHONE (OUTPATIENT)
Dept: FAMILY MEDICINE CLINIC | Facility: CLINIC | Age: 63
End: 2023-01-27

## 2023-01-27 VITALS
RESPIRATION RATE: 16 BRPM | BODY MASS INDEX: 26.76 KG/M2 | SYSTOLIC BLOOD PRESSURE: 120 MMHG | WEIGHT: 208.5 LBS | HEART RATE: 68 BPM | OXYGEN SATURATION: 95 % | HEIGHT: 74 IN | DIASTOLIC BLOOD PRESSURE: 60 MMHG

## 2023-01-27 DIAGNOSIS — E11.00 TYPE 2 DIABETES MELLITUS WITH HYPEROSMOLARITY WITHOUT COMA, WITHOUT LONG-TERM CURRENT USE OF INSULIN (HCC): Primary | Chronic | ICD-10-CM

## 2023-01-27 DIAGNOSIS — J44.9 CHRONIC OBSTRUCTIVE PULMONARY DISEASE, UNSPECIFIED COPD TYPE (HCC): ICD-10-CM

## 2023-01-27 DIAGNOSIS — E78.5 DYSLIPIDEMIA: ICD-10-CM

## 2023-01-27 LAB — SL AMB POCT HEMOGLOBIN AIC: 5.4 (ref ?–6.5)

## 2023-01-27 NOTE — TELEPHONE ENCOUNTER
Please call patient to schedule an appointment within 1-2 weeks with Dr Emilia Obrien for copd re-eval and consideration of Telergy

## 2023-01-30 ENCOUNTER — TELEPHONE (OUTPATIENT)
Dept: FAMILY MEDICINE CLINIC | Facility: CLINIC | Age: 63
End: 2023-01-30

## 2023-01-30 NOTE — TELEPHONE ENCOUNTER
Called patient and patient said he was just here and was told to follow up in 3 months and have blood work done     Please clarify

## 2023-01-30 NOTE — TELEPHONE ENCOUNTER
Dr Troy Flores message in this same encounter says he is supposed to come back in 1-2 weeks to see Dr Dez Castillo, can you please clarify?

## 2023-01-30 NOTE — TELEPHONE ENCOUNTER
It will be re-adjust his COPD after discussing the case with Dr Jesica Ruiz  It can also be a video or phone visit   Thank you

## 2023-02-10 ENCOUNTER — OFFICE VISIT (OUTPATIENT)
Dept: CARDIOLOGY CLINIC | Facility: CLINIC | Age: 63
End: 2023-02-10

## 2023-02-10 VITALS
HEIGHT: 74 IN | WEIGHT: 209 LBS | OXYGEN SATURATION: 98 % | BODY MASS INDEX: 26.82 KG/M2 | HEART RATE: 78 BPM | SYSTOLIC BLOOD PRESSURE: 148 MMHG | DIASTOLIC BLOOD PRESSURE: 78 MMHG

## 2023-02-10 DIAGNOSIS — I20.8 STABLE ANGINA (HCC): ICD-10-CM

## 2023-02-10 DIAGNOSIS — N18.31 STAGE 3A CHRONIC KIDNEY DISEASE (HCC): ICD-10-CM

## 2023-02-10 DIAGNOSIS — I10 ESSENTIAL (PRIMARY) HYPERTENSION: Primary | ICD-10-CM

## 2023-02-10 DIAGNOSIS — I50.32 CHRONIC HEART FAILURE WITH PRESERVED EJECTION FRACTION (HCC): ICD-10-CM

## 2023-02-10 DIAGNOSIS — I48.20 CHRONIC ATRIAL FIBRILLATION, UNSPECIFIED (HCC): ICD-10-CM

## 2023-02-10 RX ORDER — CARVEDILOL 12.5 MG/1
12.5 TABLET ORAL 2 TIMES DAILY WITH MEALS
Qty: 180 TABLET | Refills: 2 | Status: SHIPPED | OUTPATIENT
Start: 2023-02-10

## 2023-02-10 NOTE — PROGRESS NOTES
Progress Note - Cardiology Office  [facility]    Elton Lombard 63 y o  male MRN: [id number]  : 1960  Encounter: [id number]      ASSESSMENT:   History of CAD,  History of chronic angina  S/p CABG ,  S/p PCI to left main      Pharmacologic stress test, 2022:  Negative for stress-induced myocardial ischemia, EF 44%    TTE, 2022: Moderate to severe concentric LVH  EF 55%, DD 1, severe LAE  Trace TR     TTE, 2020:  EF 55-60%, moderate LVH, G1DD,  Moderate LAE, mild MR and TR, mild to moderate pulmonary hypertension at 55 mmHg     Essential hypertension   with hypertensive urgency on 2022 when he had Severely elevated blood pressure of 200/100 mmHg with heart rate of 89 per minute  Was admitted for 2 days to control blood pressure  BP today is 148/78 mmHg with heart rate of 78/min     PAF , possibly briefly post operatively     Diabetes mellitus     Diastolic dysfunction     COPD     History of alcohol abuse, nicotine abuse,  History of alcohol withdrawal     History of C4-C6 cord compression/spinal cord edema secondary to mechanical fall  S/p spinal surgery on 2020           RECOMMENDATIONS:  Increase Coreg to 12 5 mg twice a day   Again strongly advised on low-salt diet  Alcohol and tobacco cessation      Please call [phone] if any questions  HPI :     Elton Lombard is a 63y o  year old male who came for follow up  His blood pressure has improved since his last visit but still somewhat elevated  I advised him to increase Coreg to 12 5 mg daily and strictly follow a low-sodium diet    He is also advised on smoking and alcohol cessation    REVIEW OF SYSTEMS:  Denies chest pain, unusual dyspnea, palpitations or syncope    Historical Information   Past Medical History:   Diagnosis Date   • Acute on chronic kidney failure ([facility] [address] )    • Alcohol withdrawal ([facility] [address] ) 2019   • Cancer [facility])     prostate ca,had radiation   • Cardiac disease stents,then triple bypass   • COPD (chronic obstructive pulmonary disease) (HCC)    • ETOH abuse    • Hx of heart artery stent     2014   • Hyperlipidemia    • Hypertension    • Hypovolemic shock ([facility] [address] ) 12/22/2019   • Lumbar spondylitis ([facility] [address] ) 10/13/2022   • Nasal bone fracture 10/10/2022   • Prostate CA ([facility] [address] )    • S/P CABG x 1     2004     Past Surgical History:   Procedure Laterality Date   • CORONARY ARTERY BYPASS GRAFT  2004   • GA ARTHRD ANT INTERBODY  [address] CRV BELOW C2 N/A 12/16/2020    Procedure: Anterior cervical discectomy with fusion C4-C7;  Posterior cervical decompression and fusion C2-T2;  Surgeon: Elizabet Leonard MD;  Location: [facility];  Service: Neurosurgery   • TONSILLECTOMY       Social History     Substance and Sexual Activity   Alcohol Use Yes   • Alcohol/week: 4 0 standard drinks   • Types: 4 Standard drinks or equivalent per week    Comment: daily     Social History     Substance and Sexual Activity   Drug Use No     Social History     Tobacco Use   Smoking Status Every Day   • Packs/day: 1 00   • Years: 40 00   • Pack years: 40 00   • Types: Cigarettes   Smokeless Tobacco Never     Family History:   Family History   Problem Relation Age of Onset   • Diabetes Mother    • Uterine cancer Mother    • COPD Father    • Hypertension Father        Meds/Allergies     No Known Allergies    Current Outpatient Medications:   •  albuterol (2 5 mg/3 mL) 0 083 % nebulizer solution, Take 3 mL (2 5 mg total) by nebulization every 6 (six) hours as needed for wheezing or shortness of breath, Disp: 75 mL, Rfl: 0  •  albuterol (Ventolin HFA) 90 mcg/act inhaler, Inhale 2 puffs every 4 (four) hours as needed for wheezing, Disp: 18 g, Rfl: 0  •  Blood Glucose Monitoring Suppl (ONE TOUCH ULTRA MINI) w/Device KIT, Use as directed, Disp: , Rfl: 0  •  Breo Ellipta 200-25 MCG/ACT inhaler, INHALE 1 PUFF DAILY RINSE MOUTH AFTER USE , Disp: 60 each, Rfl: 0  •  carvedilol (COREG) 12 5 mg tablet, Take 1 tablet (12 5 mg total) by mouth 2 (two) times a day with meals, Disp: 180 tablet, Rfl: 2  •  folic acid (FOLVITE) 1 mg tablet, TAKE 1 TABLET DAILY, Disp: 90 tablet, Rfl: 2  •  gabapentin (NEURONTIN) 300 mg capsule, TAKE ONE CAPSULE THREE TIMES DAILY, Disp: 90 capsule, Rfl: 1  •  lisinopril (ZESTRIL) 20 mg tablet, Take 1 tablet (20 mg total) by mouth daily, Disp: 90 tablet, Rfl: 1  •  magnesium Oxide (MAG-OX) 400 mg TABS, Take 1 tablet (400 mg total) by mouth 2 (two) times a day, Disp: 120 tablet, Rfl: 2  •  metFORMIN (GLUCOPHAGE) 500 mg tablet, Take 1 tablet (500 mg total) by mouth daily with breakfast, Disp: 90 tablet, Rfl: 1  •  ONETOUCH DELICA LANCETS FINE MISC, 3 (three) times a day Test, Disp: , Rfl: 0  •  pantoprazole (PROTONIX) 40 mg tablet, Take 1 tablet (40 mg total) by mouth daily in the early morning Do not start before November 26, 2022  (Patient taking differently: Take 40 mg by mouth 2 (two) times a day as needed), Disp: 30 tablet, Rfl: 1  •  pravastatin (PRAVACHOL) 40 mg tablet, Take 1 tablet (40 mg total) by mouth daily with dinner, Disp: 90 tablet, Rfl: 2  •  prednisoLONE acetate (PRED FORTE) 1 % ophthalmic suspension, , Disp: , Rfl:   •  ranolazine (RANEXA) 500 mg 12 hr tablet, Take 1 tablet (500 mg total) by mouth every 12 (twelve) hours, Disp: 60 tablet, Rfl: 3  •  tamsulosin (FLOMAX) 0 4 mg, TAKE 1 CAPSULE DAILY, Disp: 90 capsule, Rfl: 1  •  Thiamine HCl (vitamin B-1) 100 MG TABS, TAKE 1 TABLET DAILY, Disp: 90 tablet, Rfl: 0  •  ferrous sulfate 325 (65 Fe) mg tablet, Take 1 tablet (325 mg total) by mouth 2 (two) times a day with meals, Disp: 60 tablet, Rfl: 5    Vitals: Blood pressure 148/78, pulse 78, height 6' 2" (1 88 m), weight 94 8 kg (209 lb), SpO2 98 %  ?  Body mass index is 26 83 kg/m²    Vitals:    02/10/23 1112   Weight: 94 8 kg (209 lb)     BP Readings from Last 3 Encounters:   02/10/23 148/78   01/27/23 120/60   01/17/23 165/88       Physical Exam:    Neurologic:  Alert & oriented x 3, no new focal deficits, Not in any acute distress,  Constitutional:  Well developed, well nourished, non-toxic appearance   Eyes:  Pupil equal and reacting to light, conjunctiva normal,   HENT:  Atraumatic, oropharynx moist, Neck- normal range of motion, no tenderness,  Neck supple, No JVP, No LNP   Respiratory:  Bilateral air entry, mostly clear to auscultation  Cardiovascular: S1-S2 regular rhythm with a I/VI systolic murmur   GI:  Soft, nondistended, normal bowel sounds, nontender, no hepatosplenomegaly appreciated  Musculoskeletal:  No tenderness, no deformities  Skin:  Well hydrated, no rash   Lymphatic:  No lymphadenopathy noted   Extremities:  No edema         Diagnostic Studies Review Cardio:      EKG: Normal sinus rhythm, heart rate 78/min, possible left atrial enlargement  Cardiac testing:   Results for orders placed during the hospital encounter of 20    Echo complete with contrast if indicated    [address]  [address] [facility]  [place] [address]  [phone]    Transthoracic Echocardiogram  2D, M-mode, Doppler, and Color Doppler    Study date:  05-Dec-2020    Patient: Shireen Heard  MR number: [id number]  Account number: [de-identified]  : 1960  Age: 64 years  Gender: Male  Status: Inpatient  Location: Bedside  Height: 70 in  Weight: 159 7 lb  BP: 124/ 76 mmHg    Indications: A-Fib    Diagnoses: I48 0 - Atrial fibrillation    Sonographer:  Giselle Bhatti RDCS  Primary Physician:  Jesus Jacques  Referring Physician:  Maral Germain MD  Ordering Physician:  Maral Germain MD  Interpreting Physician:  DO SHEYLA Gomes    LEFT VENTRICLE:  Systolic function was normal by visual assessment  Ejection fraction was estimated in the range of 55 % to 60 %  There were no regional wall motion abnormalities  There was moderate concentric hypertrophy  Doppler parameters were consistent with abnormal left ventricular relaxation (grade 1 diastolic dysfunction)      LEFT ATRIUM:  The atrium was moderately dilated  MITRAL VALVE:  There was mild regurgitation  AORTIC VALVE:  There was no evidence for stenosis  There was no regurgitation  TRICUSPID VALVE:  There was mild regurgitation  Pulmonary artery systolic pressure was mildly to moderately increased  Estimated peak PA pressure was 55 mmHg  COMPARISONS:  There has been no significant interval change  Comparison was made with the previous study of 23-Dec-2019  HISTORY: PRIOR HISTORY: Alcohol Abuse, DM, HTN, HLD, CAD, COPD    PROCEDURE: The procedure was performed at the bedside  This was a routine study  The transthoracic approach was used  The study included complete 2D imaging, M-mode, complete spectral Doppler, and color Doppler  The heart rate was 91 bpm,  at the start of the study  Image quality was adequate  LEFT VENTRICLE: Size was normal  Systolic function was normal by visual assessment  Ejection fraction was estimated in the range of 55 % to 60 %  There were no regional wall motion abnormalities  There was moderate concentric hypertrophy  DOPPLER: Doppler parameters were consistent with abnormal left ventricular relaxation (grade 1 diastolic dysfunction)  RIGHT VENTRICLE: The size was normal  Systolic function was normal  DOPPLER: Systolic pressure was within the normal range  LEFT ATRIUM: The atrium was moderately dilated  RIGHT ATRIUM: The atrium was mildly dilated  MITRAL VALVE: Valve structure was normal  There was normal leaflet separation  No echocardiographic evidence for prolapse  DOPPLER: The transmitral velocity was within the normal range  There was no evidence for stenosis  There was mild  regurgitation  AORTIC VALVE: The valve was trileaflet  Leaflets exhibited normal thickness and normal cuspal separation  DOPPLER: Transaortic velocity was within the normal range  There was no evidence for stenosis  There was no regurgitation      TRICUSPID VALVE: The valve structure was normal  There was normal leaflet separation  DOPPLER: The transtricuspid velocity was within the normal range  There was mild regurgitation  Pulmonary artery systolic pressure was mildly to  moderately increased  Estimated peak PA pressure was 55 mmHg  PULMONIC VALVE: Leaflets exhibited normal thickness, no calcification, and normal cuspal separation  DOPPLER: The transpulmonic velocity was within the normal range  There was no regurgitation  PERICARDIUM: There was no thickening  There was no pericardial effusion  AORTA: The root exhibited normal size  PULMONARY ARTERY: The size was normal  The morphology appeared normal     SYSTEM MEASUREMENT TABLES    2D  EF (Teich): 59 98 %    PW  MV E/A Ratio: 3 5    [facility] Accredited [facility]chocardiography [facility]    Prepared and electronically signed by    Juan Beltran DO  Signed 07-Dec-2020 10:55:08      Results for orders placed during the hospital encounter of 11/23/22    Echo complete w/ contrast if indicated    Interpretation Summary  •  Left Ventricle: Left ventricular cavity size is normal  Wall thickness is moderately increased  There is moderate to severe concentric hypertrophy  The left ventricular ejection fraction is 55% by biplane measurement  Systolic function is normal  Diastolic function is mildly abnormal, consistent with grade I (abnormal) relaxation  •  Right Ventricle: Systolic function is low normal   •  Left Atrium: The atrium is severely dilated (>48 mL/m2)  •  Grossly normal valve function without hemodynamically significant stenosis or regurgitation  Results for orders placed during the hospital encounter of 11/23/22    NM Myocardial Perfusion Spect (Pharmacological Induced Stress and/or Rest)    Interpretation Summary  •  Stress ECG: No ST deviation is noted  The ECG was not diagnostic due to pharmacological (vasodilator) stress  •  Perfusion: There are no perfusion defects    •  Stress Function: Left ventricular function post-stress is normal  Post-stress ejection fraction is 44 %  •  Stress Combined Conclusion: The ECG and SPECT imaging portions of the stress study are concordant with no evidence of stress induced myocardial ischemia  Imaging:  Chest X-Ray:   XR chest 2 views    Result Date: 1/17/2023  Impression No acute cardiopulmonary disease  Workstation performed: [id number]       CT-scan of the chest:     No CTA results available for this patient    Lab Review   Lab Results   Component Value Date    WBC 8 35 01/17/2023    HGB 15 6 01/17/2023    HCT 46 0 01/17/2023    MCV 98 01/17/2023    RDW 13 8 01/17/2023     01/17/2023     BMP:  Lab Results   Component Value Date    SODIUM 138 01/17/2023    K 5 3 01/17/2023     01/17/2023    CO2 27 01/17/2023    BUN 19 01/17/2023    CREATININE 1 04 01/17/2023    GLUC 91 01/17/2023    GLUF 97 11/22/2022    CALCIUM 9 2 01/17/2023    CORRECTEDCA 9 5 11/25/2022    EGFR 77 01/17/2023    MG 1 7 01/17/2023     LFT:  Lab Results   Component Value Date    AST 63 (H) 01/17/2023    ALT 59 01/17/2023    ALKPHOS 89 01/17/2023    TP 8 2 01/17/2023    ALB 4 1 01/17/2023      No components found for: [employer] [facility]  Lab Results   Component Value Date    ZBO2ZKJXVODS 0 518 11/23/2022     Lab Results   Component Value Date    HGBA1C 5 4 01/27/2023     Lipid Profile:   Lab Results   Component Value Date    CHOLESTEROL 193 06/10/2022    HDL 83 06/10/2022    LDLCALC 100 06/10/2022    TRIG 51 06/10/2022     Lab Results   Component Value Date    CHOLESTEROL 193 06/10/2022    CHOLESTEROL 191 11/19/2021     Lab Results   Component Value Date    CKTOTAL 246 04/22/2021    CKMB 2 0 04/22/2021    CKMBINDEX <1 0 04/22/2021    TROPONINI <0 02 06/25/2021     Lab Results   Component Value Date    NTBNP 481 (H) 12/22/2019      Recent Results (from the past 672 hour(s))   ECG 12 lead    Collection Time: 01/17/23 12:17 PM   Result Value Ref Range    Ventricular Rate 107 BPM    Atrial Rate 107 BPM    WA Interval 118 ms    QRSD Interval 80 ms    QT Interval 324 ms    QTC Interval 432 ms    P Axis 75 degrees    QRS Axis 86 degrees    T Wave Axis 52 degrees   CBC and differential    Collection Time: 01/17/23 12:33 PM   Result Value Ref Range    WBC 8 35 4 31 - 10 16 Thousand/uL    RBC 4 71 3 88 - 5 62 Million/uL    Hemoglobin 15 6 12 0 - 17 0 g/dL    Hematocrit 46 0 36 5 - 49 3 %    MCV 98 82 - 98 fL    MCH 33 1 26 8 - 34 3 pg    MCHC 33 9 31 4 - 37 4 g/dL    RDW 13 8 11 6 - 15 1 %    MPV 9 5 8 9 - 12 7 fL    Platelets 548 503 - 199 Thousands/uL    nRBC 0 /100 WBCs    Neutrophils Relative 73 43 - 75 %    Immat GRANS % 0 0 - 2 %    Lymphocytes Relative 14 14 - 44 %    Monocytes Relative 10 4 - 12 %    Eosinophils Relative 1 0 - 6 %    Basophils Relative 2 (H) 0 - 1 %    Neutrophils Absolute 6 09 1 85 - 7 62 Thousands/µL    Immature Grans Absolute 0 02 0 00 - 0 20 Thousand/uL    Lymphocytes Absolute 1 18 0 60 - 4 47 Thousands/µL    Monocytes Absolute 0 81 0 17 - 1 22 Thousand/µL    Eosinophils Absolute 0 07 0 00 - 0 61 Thousand/µL    Basophils Absolute 0 18 (H) 0 00 - 0 10 Thousands/µL   Comprehensive metabolic panel    Collection Time: 01/17/23 12:33 PM   Result Value Ref Range    Sodium 138 135 - 147 mmol/L    Potassium 5 3 3 5 - 5 3 mmol/L    Chloride 100 96 - 108 mmol/L    CO2 27 21 - 32 mmol/L    ANION GAP 11 4 - 13 mmol/L    BUN 19 5 - 25 mg/dL    Creatinine 1 04 0 60 - 1 30 mg/dL    Glucose 91 65 - 140 mg/dL    Calcium 9 2 8 3 - 10 1 mg/dL    AST 63 (H) 5 - 45 U/L    ALT 59 12 - 78 U/L    Alkaline Phosphatase 89 46 - 116 U/L    Total Protein 8 2 6 4 - 8 4 g/dL    Albumin 4 1 3 5 - 5 0 g/dL    Total Bilirubin 0 49 0 20 - 1 00 mg/dL    eGFR 77 ml/min/1 73sq m   FLU/RSV/COVID - if FLU/RSV clinically relevant    Collection Time: 01/17/23 12:33 PM    Specimen: Nose; Nares   Result Value Ref Range    SARS-CoV-2 Negative Negative    INFLUENZA A PCR Negative Negative    INFLUENZA B PCR Negative Negative    RSV PCR Negative Negative   Ethanol    Collection Time: 01/17/23 12:33 PM   Result Value Ref Range    Ethanol Lvl <3 0 - 3 mg/dL   Magnesium    Collection Time: 01/17/23 12:33 PM   Result Value Ref Range    Magnesium 1 7 1 6 - 2 6 mg/dL   Lipase    Collection Time: 01/17/23 12:33 PM   Result Value Ref Range    Lipase 174 73 - 393 u/L   Protime-INR    Collection Time: 01/17/23 12:33 PM   Result Value Ref Range    Protime 12 1 11 6 - 14 5 seconds    INR 0 89 0 84 - 1 19   APTT    Collection Time: 01/17/23 12:33 PM   Result Value Ref Range    PTT 25 23 - 37 seconds   Rapid drug screen, urine    Collection Time: 01/17/23  2:30 PM   Result Value Ref Range    Amph/Meth UR Negative Negative    Barbiturate Ur Negative Negative    Benzodiazepine Urine Negative Negative    Cocaine Urine Negative Negative    Methadone Urine Negative Negative    Opiate Urine Negative Negative    PCP Ur Negative Negative    THC Urine Negative Negative    Oxycodone Urine Negative Negative   POCT hemoglobin A1c    Collection Time: 01/27/23  1:16 PM   Result Value Ref Range    Hemoglobin A1C 5 4 6 5             Dr Shabbir Napoles MD, [facility]      "This note has been constructed using a voice recognition system  Therefore there may be syntax, spelling, and/or grammatical errors   Please call if you have any questions  "

## 2023-02-13 ENCOUNTER — TELEPHONE (OUTPATIENT)
Dept: FAMILY MEDICINE CLINIC | Facility: CLINIC | Age: 63
End: 2023-02-13

## 2023-02-15 ENCOUNTER — PATIENT OUTREACH (OUTPATIENT)
Dept: FAMILY MEDICINE CLINIC | Facility: CLINIC | Age: 63
End: 2023-02-15

## 2023-02-15 DIAGNOSIS — I10 ESSENTIAL (PRIMARY) HYPERTENSION: Chronic | ICD-10-CM

## 2023-02-15 RX ORDER — PRAVASTATIN SODIUM 40 MG
TABLET ORAL
Qty: 90 TABLET | Refills: 2 | Status: SHIPPED | OUTPATIENT
Start: 2023-02-15

## 2023-03-03 DIAGNOSIS — I10 ESSENTIAL (PRIMARY) HYPERTENSION: Chronic | ICD-10-CM

## 2023-03-06 RX ORDER — RANOLAZINE 500 MG/1
TABLET, EXTENDED RELEASE ORAL
Qty: 60 TABLET | Refills: 3 | Status: SHIPPED | OUTPATIENT
Start: 2023-03-06

## 2023-03-14 DIAGNOSIS — F10.10 ALCOHOL ABUSE: Chronic | ICD-10-CM

## 2023-03-14 RX ORDER — LANOLIN ALCOHOL/MO/W.PET/CERES
CREAM (GRAM) TOPICAL
Qty: 60 TABLET | Refills: 2 | Status: SHIPPED | OUTPATIENT
Start: 2023-03-14

## 2023-05-09 DIAGNOSIS — E11.00 TYPE 2 DIABETES MELLITUS WITH HYPEROSMOLARITY WITHOUT COMA, WITHOUT LONG-TERM CURRENT USE OF INSULIN (HCC): Chronic | ICD-10-CM

## 2023-05-11 ENCOUNTER — HOSPITAL ENCOUNTER (INPATIENT)
Facility: HOSPITAL | Age: 63
LOS: 1 days | Discharge: HOME | End: 2023-05-13
Attending: EMERGENCY MEDICINE | Admitting: FAMILY MEDICINE

## 2023-05-11 ENCOUNTER — APPOINTMENT (EMERGENCY)
Dept: RADIOLOGY | Facility: HOSPITAL | Age: 63
End: 2023-05-11

## 2023-05-11 DIAGNOSIS — I10 ESSENTIAL HYPERTENSION: ICD-10-CM

## 2023-05-11 DIAGNOSIS — F10.929 ALCOHOL INTOXICATION (HCC): Primary | ICD-10-CM

## 2023-05-11 DIAGNOSIS — F10.10 ALCOHOL ABUSE: Chronic | ICD-10-CM

## 2023-05-11 DIAGNOSIS — E87.1 HYPONATREMIA: ICD-10-CM

## 2023-05-11 LAB
ALBUMIN SERPL BCP-MCNC: 4 G/DL (ref 3.5–5)
ALP SERPL-CCNC: 59 U/L (ref 34–104)
ALT SERPL W P-5'-P-CCNC: 22 U/L (ref 7–52)
ANION GAP SERPL CALCULATED.3IONS-SCNC: 11 MMOL/L (ref 4–13)
AST SERPL W P-5'-P-CCNC: 31 U/L (ref 13–39)
BASOPHILS # BLD AUTO: 0.06 THOUSANDS/ÂΜL (ref 0–0.1)
BASOPHILS NFR BLD AUTO: 1 % (ref 0–1)
BILIRUB SERPL-MCNC: 0.36 MG/DL (ref 0.2–1)
BUN SERPL-MCNC: 25 MG/DL (ref 5–25)
CALCIUM SERPL-MCNC: 8.4 MG/DL (ref 8.4–10.2)
CHLORIDE SERPL-SCNC: 92 MMOL/L (ref 96–108)
CO2 SERPL-SCNC: 24 MMOL/L (ref 21–32)
CREAT SERPL-MCNC: 1.13 MG/DL (ref 0.6–1.3)
EOSINOPHIL # BLD AUTO: 0.07 THOUSAND/ÂΜL (ref 0–0.61)
EOSINOPHIL NFR BLD AUTO: 1 % (ref 0–6)
ERYTHROCYTE [DISTWIDTH] IN BLOOD BY AUTOMATED COUNT: 15.1 % (ref 11.6–15.1)
ETHANOL SERPL-MCNC: 367 MG/DL
GFR SERPL CREATININE-BSD FRML MDRD: 69 ML/MIN/1.73SQ M
GLUCOSE SERPL-MCNC: 115 MG/DL (ref 65–140)
HCT VFR BLD AUTO: 36.9 % (ref 36.5–49.3)
HGB BLD-MCNC: 13.1 G/DL (ref 12–17)
IMM GRANULOCYTES # BLD AUTO: 0.02 THOUSAND/UL (ref 0–0.2)
IMM GRANULOCYTES NFR BLD AUTO: 0 % (ref 0–2)
LYMPHOCYTES # BLD AUTO: 1.07 THOUSANDS/ÂΜL (ref 0.6–4.47)
LYMPHOCYTES NFR BLD AUTO: 15 % (ref 14–44)
MCH RBC QN AUTO: 34.5 PG (ref 26.8–34.3)
MCHC RBC AUTO-ENTMCNC: 35.5 G/DL (ref 31.4–37.4)
MCV RBC AUTO: 97 FL (ref 82–98)
MONOCYTES # BLD AUTO: 1.15 THOUSAND/ÂΜL (ref 0.17–1.22)
MONOCYTES NFR BLD AUTO: 16 % (ref 4–12)
NEUTROPHILS # BLD AUTO: 4.68 THOUSANDS/ÂΜL (ref 1.85–7.62)
NEUTS SEG NFR BLD AUTO: 67 % (ref 43–75)
NRBC BLD AUTO-RTO: 0 /100 WBCS
PLATELET # BLD AUTO: 150 THOUSANDS/UL (ref 149–390)
PMV BLD AUTO: 9.4 FL (ref 8.9–12.7)
POTASSIUM SERPL-SCNC: 4.5 MMOL/L (ref 3.5–5.3)
PROT SERPL-MCNC: 7.2 G/DL (ref 6.4–8.4)
RBC # BLD AUTO: 3.8 MILLION/UL (ref 3.88–5.62)
SODIUM SERPL-SCNC: 127 MMOL/L (ref 135–147)
WBC # BLD AUTO: 7.05 THOUSAND/UL (ref 4.31–10.16)

## 2023-05-11 RX ORDER — FLUTICASONE FUROATE AND VILANTEROL 200; 25 UG/1; UG/1
1 POWDER RESPIRATORY (INHALATION) DAILY
Status: DISCONTINUED | OUTPATIENT
Start: 2023-05-11 | End: 2023-05-13 | Stop reason: HOSPADM

## 2023-05-11 RX ORDER — RANOLAZINE 500 MG/1
500 TABLET, EXTENDED RELEASE ORAL EVERY 12 HOURS
Status: DISCONTINUED | OUTPATIENT
Start: 2023-05-11 | End: 2023-05-13 | Stop reason: HOSPADM

## 2023-05-11 RX ORDER — ALBUTEROL SULFATE 90 UG/1
2 AEROSOL, METERED RESPIRATORY (INHALATION) EVERY 4 HOURS PRN
Status: DISCONTINUED | OUTPATIENT
Start: 2023-05-11 | End: 2023-05-13 | Stop reason: HOSPADM

## 2023-05-11 RX ORDER — FOLIC ACID 1 MG/1
1 TABLET ORAL DAILY
Status: DISCONTINUED | OUTPATIENT
Start: 2023-05-12 | End: 2023-05-13 | Stop reason: HOSPADM

## 2023-05-11 RX ORDER — LORAZEPAM 2 MG/ML
0.5 INJECTION INTRAMUSCULAR ONCE
Status: COMPLETED | OUTPATIENT
Start: 2023-05-11 | End: 2023-05-11

## 2023-05-11 RX ORDER — CARVEDILOL 12.5 MG/1
12.5 TABLET ORAL 2 TIMES DAILY WITH MEALS
Status: DISCONTINUED | OUTPATIENT
Start: 2023-05-12 | End: 2023-05-13 | Stop reason: HOSPADM

## 2023-05-11 RX ORDER — PANTOPRAZOLE SODIUM 40 MG/1
40 TABLET, DELAYED RELEASE ORAL
Status: DISCONTINUED | OUTPATIENT
Start: 2023-05-12 | End: 2023-05-13 | Stop reason: HOSPADM

## 2023-05-11 RX ORDER — ACETAMINOPHEN 325 MG/1
650 TABLET ORAL EVERY 6 HOURS PRN
Status: DISCONTINUED | OUTPATIENT
Start: 2023-05-11 | End: 2023-05-13 | Stop reason: HOSPADM

## 2023-05-11 RX ORDER — LANOLIN ALCOHOL/MO/W.PET/CERES
100 CREAM (GRAM) TOPICAL DAILY
Status: DISCONTINUED | OUTPATIENT
Start: 2023-05-12 | End: 2023-05-13 | Stop reason: HOSPADM

## 2023-05-11 RX ORDER — LISINOPRIL 20 MG/1
20 TABLET ORAL DAILY
Status: DISCONTINUED | OUTPATIENT
Start: 2023-05-12 | End: 2023-05-12

## 2023-05-11 RX ORDER — TAMSULOSIN HYDROCHLORIDE 0.4 MG/1
0.4 CAPSULE ORAL
Status: DISCONTINUED | OUTPATIENT
Start: 2023-05-11 | End: 2023-05-13 | Stop reason: HOSPADM

## 2023-05-11 RX ORDER — PRAVASTATIN SODIUM 40 MG
40 TABLET ORAL
Status: DISCONTINUED | OUTPATIENT
Start: 2023-05-11 | End: 2023-05-13 | Stop reason: HOSPADM

## 2023-05-11 RX ORDER — ENOXAPARIN SODIUM 100 MG/ML
40 INJECTION SUBCUTANEOUS DAILY
Status: DISCONTINUED | OUTPATIENT
Start: 2023-05-12 | End: 2023-05-13 | Stop reason: HOSPADM

## 2023-05-11 RX ORDER — FERROUS SULFATE 325(65) MG
325 TABLET ORAL 2 TIMES DAILY WITH MEALS
Status: DISCONTINUED | OUTPATIENT
Start: 2023-05-12 | End: 2023-05-12

## 2023-05-11 RX ORDER — SODIUM CHLORIDE 9 MG/ML
75 INJECTION, SOLUTION INTRAVENOUS CONTINUOUS
Status: DISCONTINUED | OUTPATIENT
Start: 2023-05-11 | End: 2023-05-12

## 2023-05-11 RX ORDER — GINSENG 100 MG
1 CAPSULE ORAL ONCE
Status: COMPLETED | OUTPATIENT
Start: 2023-05-11 | End: 2023-05-11

## 2023-05-11 RX ADMIN — FLUTICASONE FUROATE AND VILANTEROL TRIFENATATE 1 PUFF: 200; 25 POWDER RESPIRATORY (INHALATION) at 23:14

## 2023-05-11 RX ADMIN — SODIUM CHLORIDE 500 ML: 0.9 INJECTION, SOLUTION INTRAVENOUS at 20:48

## 2023-05-11 RX ADMIN — ALBUTEROL SULFATE 2 PUFF: 90 AEROSOL, METERED RESPIRATORY (INHALATION) at 23:13

## 2023-05-11 RX ADMIN — TAMSULOSIN HYDROCHLORIDE 0.4 MG: 0.4 CAPSULE ORAL at 23:13

## 2023-05-11 RX ADMIN — PRAVASTATIN SODIUM 40 MG: 40 TABLET ORAL at 23:13

## 2023-05-11 RX ADMIN — RANOLAZINE 500 MG: 500 TABLET, EXTENDED RELEASE ORAL at 23:14

## 2023-05-11 RX ADMIN — LORAZEPAM 0.5 MG: 2 INJECTION INTRAMUSCULAR; INTRAVENOUS at 23:13

## 2023-05-11 RX ADMIN — BACITRACIN 1 SMALL APPLICATION: 500 OINTMENT TOPICAL at 20:48

## 2023-05-11 RX ADMIN — SODIUM CHLORIDE 75 ML/HR: 0.9 INJECTION, SOLUTION INTRAVENOUS at 22:21

## 2023-05-12 ENCOUNTER — APPOINTMENT (OUTPATIENT)
Dept: RADIOLOGY | Facility: HOSPITAL | Age: 63
End: 2023-05-12

## 2023-05-12 LAB
ALBUMIN SERPL BCP-MCNC: 3.6 G/DL (ref 3.5–5)
ALP SERPL-CCNC: 56 U/L (ref 34–104)
ALT SERPL W P-5'-P-CCNC: 20 U/L (ref 7–52)
AMPHETAMINES SERPL QL SCN: NEGATIVE
ANION GAP SERPL CALCULATED.3IONS-SCNC: 9 MMOL/L (ref 4–13)
AST SERPL W P-5'-P-CCNC: 28 U/L (ref 13–39)
BARBITURATES UR QL: NEGATIVE
BASOPHILS # BLD AUTO: 0.06 THOUSANDS/ÂΜL (ref 0–0.1)
BASOPHILS NFR BLD AUTO: 1 % (ref 0–1)
BENZODIAZ UR QL: NEGATIVE
BILIRUB SERPL-MCNC: 0.31 MG/DL (ref 0.2–1)
BUN SERPL-MCNC: 26 MG/DL (ref 5–25)
CALCIUM SERPL-MCNC: 8.4 MG/DL (ref 8.4–10.2)
CHLORIDE SERPL-SCNC: 99 MMOL/L (ref 96–108)
CO2 SERPL-SCNC: 25 MMOL/L (ref 21–32)
COCAINE UR QL: NEGATIVE
CREAT SERPL-MCNC: 1.27 MG/DL (ref 0.6–1.3)
EOSINOPHIL # BLD AUTO: 0.08 THOUSAND/ÂΜL (ref 0–0.61)
EOSINOPHIL NFR BLD AUTO: 2 % (ref 0–6)
ERYTHROCYTE [DISTWIDTH] IN BLOOD BY AUTOMATED COUNT: 15.4 % (ref 11.6–15.1)
GFR SERPL CREATININE-BSD FRML MDRD: 60 ML/MIN/1.73SQ M
GLUCOSE P FAST SERPL-MCNC: 139 MG/DL (ref 65–99)
GLUCOSE SERPL-MCNC: 139 MG/DL (ref 65–140)
HCT VFR BLD AUTO: 36.5 % (ref 36.5–49.3)
HGB BLD-MCNC: 12.6 G/DL (ref 12–17)
IMM GRANULOCYTES # BLD AUTO: 0.01 THOUSAND/UL (ref 0–0.2)
IMM GRANULOCYTES NFR BLD AUTO: 0 % (ref 0–2)
LYMPHOCYTES # BLD AUTO: 0.87 THOUSANDS/ÂΜL (ref 0.6–4.47)
LYMPHOCYTES NFR BLD AUTO: 19 % (ref 14–44)
MCH RBC QN AUTO: 34.2 PG (ref 26.8–34.3)
MCHC RBC AUTO-ENTMCNC: 34.5 G/DL (ref 31.4–37.4)
MCV RBC AUTO: 99 FL (ref 82–98)
METHADONE UR QL: NEGATIVE
MONOCYTES # BLD AUTO: 1.12 THOUSAND/ÂΜL (ref 0.17–1.22)
MONOCYTES NFR BLD AUTO: 24 % (ref 4–12)
NEUTROPHILS # BLD AUTO: 2.48 THOUSANDS/ÂΜL (ref 1.85–7.62)
NEUTS SEG NFR BLD AUTO: 54 % (ref 43–75)
NRBC BLD AUTO-RTO: 0 /100 WBCS
OPIATES UR QL SCN: NEGATIVE
OXYCODONE+OXYMORPHONE UR QL SCN: NEGATIVE
PCP UR QL: NEGATIVE
PLATELET # BLD AUTO: 129 THOUSANDS/UL (ref 149–390)
PMV BLD AUTO: 9.3 FL (ref 8.9–12.7)
POTASSIUM SERPL-SCNC: 4.5 MMOL/L (ref 3.5–5.3)
PROT SERPL-MCNC: 6.6 G/DL (ref 6.4–8.4)
RBC # BLD AUTO: 3.68 MILLION/UL (ref 3.88–5.62)
SODIUM SERPL-SCNC: 133 MMOL/L (ref 135–147)
THC UR QL: NEGATIVE
WBC # BLD AUTO: 4.62 THOUSAND/UL (ref 4.31–10.16)

## 2023-05-12 RX ORDER — LORAZEPAM 2 MG/ML
1 INJECTION INTRAMUSCULAR ONCE
Status: COMPLETED | OUTPATIENT
Start: 2023-05-12 | End: 2023-05-12

## 2023-05-12 RX ORDER — ASCORBIC ACID 500 MG
500 TABLET ORAL DAILY
Status: DISCONTINUED | OUTPATIENT
Start: 2023-05-12 | End: 2023-05-13 | Stop reason: HOSPADM

## 2023-05-12 RX ORDER — AMLODIPINE BESYLATE 10 MG/1
10 TABLET ORAL DAILY
Status: DISCONTINUED | OUTPATIENT
Start: 2023-05-13 | End: 2023-05-12

## 2023-05-12 RX ORDER — FERROUS SULFATE 325(65) MG
325 TABLET ORAL
Status: DISCONTINUED | OUTPATIENT
Start: 2023-05-13 | End: 2023-05-13 | Stop reason: HOSPADM

## 2023-05-12 RX ORDER — NALTREXONE HYDROCHLORIDE 50 MG/1
50 TABLET, FILM COATED ORAL DAILY
Status: DISCONTINUED | OUTPATIENT
Start: 2023-05-12 | End: 2023-05-13 | Stop reason: HOSPADM

## 2023-05-12 RX ORDER — LISINOPRIL 20 MG/1
20 TABLET ORAL ONCE
Status: COMPLETED | OUTPATIENT
Start: 2023-05-12 | End: 2023-05-12

## 2023-05-12 RX ORDER — AMLODIPINE BESYLATE 5 MG/1
5 TABLET ORAL DAILY
Status: DISCONTINUED | OUTPATIENT
Start: 2023-05-12 | End: 2023-05-12

## 2023-05-12 RX ORDER — GABAPENTIN 100 MG/1
100 CAPSULE ORAL 3 TIMES DAILY
Status: DISCONTINUED | OUTPATIENT
Start: 2023-05-12 | End: 2023-05-13 | Stop reason: HOSPADM

## 2023-05-12 RX ORDER — LABETALOL 100 MG/1
200 TABLET, FILM COATED ORAL ONCE
Status: COMPLETED | OUTPATIENT
Start: 2023-05-12 | End: 2023-05-12

## 2023-05-12 RX ORDER — AMLODIPINE BESYLATE 10 MG/1
10 TABLET ORAL
Status: DISCONTINUED | OUTPATIENT
Start: 2023-05-12 | End: 2023-05-13 | Stop reason: HOSPADM

## 2023-05-12 RX ORDER — AMLODIPINE BESYLATE 5 MG/1
5 TABLET ORAL ONCE
Status: COMPLETED | OUTPATIENT
Start: 2023-05-12 | End: 2023-05-12

## 2023-05-12 RX ORDER — DIAZEPAM 2 MG/1
2 TABLET ORAL ONCE AS NEEDED
Status: DISCONTINUED | OUTPATIENT
Start: 2023-05-12 | End: 2023-05-12

## 2023-05-12 RX ORDER — LORAZEPAM 2 MG/ML
4 INJECTION INTRAMUSCULAR ONCE
Status: COMPLETED | OUTPATIENT
Start: 2023-05-12 | End: 2023-05-12

## 2023-05-12 RX ORDER — LISINOPRIL 20 MG/1
40 TABLET ORAL DAILY
Status: DISCONTINUED | OUTPATIENT
Start: 2023-05-13 | End: 2023-05-13 | Stop reason: HOSPADM

## 2023-05-12 RX ADMIN — GABAPENTIN 100 MG: 100 CAPSULE ORAL at 21:13

## 2023-05-12 RX ADMIN — CARVEDILOL 12.5 MG: 12.5 TABLET, FILM COATED ORAL at 15:39

## 2023-05-12 RX ADMIN — TAMSULOSIN HYDROCHLORIDE 0.4 MG: 0.4 CAPSULE ORAL at 15:41

## 2023-05-12 RX ADMIN — METFORMIN HYDROCHLORIDE 500 MG: 500 TABLET ORAL at 07:13

## 2023-05-12 RX ADMIN — THIAMINE HCL TAB 100 MG 100 MG: 100 TAB at 08:08

## 2023-05-12 RX ADMIN — LORAZEPAM 1 MG: 2 INJECTION INTRAMUSCULAR; INTRAVENOUS at 12:54

## 2023-05-12 RX ADMIN — AMLODIPINE BESYLATE 10 MG: 10 TABLET ORAL at 21:13

## 2023-05-12 RX ADMIN — AMLODIPINE BESYLATE 5 MG: 5 TABLET ORAL at 12:54

## 2023-05-12 RX ADMIN — LISINOPRIL 20 MG: 20 TABLET ORAL at 08:53

## 2023-05-12 RX ADMIN — Medication 1 TABLET: at 08:08

## 2023-05-12 RX ADMIN — PANTOPRAZOLE SODIUM 40 MG: 40 TABLET, DELAYED RELEASE ORAL at 06:15

## 2023-05-12 RX ADMIN — RANOLAZINE 500 MG: 500 TABLET, EXTENDED RELEASE ORAL at 08:10

## 2023-05-12 RX ADMIN — LABETALOL HYDROCHLORIDE 200 MG: 100 TABLET, FILM COATED ORAL at 17:03

## 2023-05-12 RX ADMIN — LORAZEPAM 1 MG: 2 INJECTION INTRAMUSCULAR; INTRAVENOUS at 21:13

## 2023-05-12 RX ADMIN — NALTREXONE HYDROCHLORIDE 50 MG: 50 TABLET, FILM COATED ORAL at 15:40

## 2023-05-12 RX ADMIN — OXYCODONE HYDROCHLORIDE AND ACETAMINOPHEN 500 MG: 500 TABLET ORAL at 10:30

## 2023-05-12 RX ADMIN — ACETAMINOPHEN 650 MG: 325 TABLET, FILM COATED ORAL at 22:51

## 2023-05-12 RX ADMIN — PRAVASTATIN SODIUM 40 MG: 40 TABLET ORAL at 15:40

## 2023-05-12 RX ADMIN — FOLIC ACID 1 MG: 1 TABLET ORAL at 08:08

## 2023-05-12 RX ADMIN — RANOLAZINE 500 MG: 500 TABLET, EXTENDED RELEASE ORAL at 21:13

## 2023-05-12 RX ADMIN — ENOXAPARIN SODIUM 40 MG: 40 INJECTION SUBCUTANEOUS at 08:11

## 2023-05-12 RX ADMIN — GABAPENTIN 100 MG: 100 CAPSULE ORAL at 15:39

## 2023-05-12 RX ADMIN — GABAPENTIN 100 MG: 100 CAPSULE ORAL at 10:30

## 2023-05-12 RX ADMIN — LISINOPRIL 20 MG: 20 TABLET ORAL at 08:07

## 2023-05-12 RX ADMIN — PNEUMOCOCCAL 20-VALENT CONJUGATE VACCINE 0.5 ML
2.2; 2.2; 2.2; 2.2; 2.2; 2.2; 2.2; 2.2; 2.2; 2.2; 2.2; 2.2; 2.2; 2.2; 2.2; 2.2; 4.4; 2.2; 2.2; 2.2 INJECTION, SUSPENSION INTRAMUSCULAR at 08:17

## 2023-05-12 RX ADMIN — FERROUS SULFATE TAB 325 MG (65 MG ELEMENTAL FE) 325 MG: 325 (65 FE) TAB at 07:12

## 2023-05-12 RX ADMIN — AMLODIPINE BESYLATE 5 MG: 5 TABLET ORAL at 15:38

## 2023-05-12 RX ADMIN — FLUTICASONE FUROATE AND VILANTEROL TRIFENATATE 1 PUFF: 200; 25 POWDER RESPIRATORY (INHALATION) at 08:09

## 2023-05-12 RX ADMIN — LORAZEPAM 4 MG: 2 INJECTION INTRAMUSCULAR; INTRAVENOUS at 22:50

## 2023-05-12 RX ADMIN — CARVEDILOL 12.5 MG: 12.5 TABLET, FILM COATED ORAL at 07:12

## 2023-05-12 NOTE — ASSESSMENT & PLAN NOTE
Presented with etoh 367 on admission after consuming half a bottle of vodka prior to admission  History of chronic alcohol abuse drinks a quart of vodka daily   Mild tremor noted on exam    - CIWA protocol with Ativan   - Fall precautions  - Monitor for withdrawal  - Continue Thiamine, Folate, ferrous sulfate

## 2023-05-12 NOTE — ASSESSMENT & PLAN NOTE
Chronic, stable  Home Coreg 12 5 mg BID and Lisinopril 20 mg daily   Pt experienced episode of hypertensive urgency morning of 5/12 which slowly decreased after home medications were administered in admission to 20 mg Lisinopril    - Continue home Coreg 12 5 mg BID   - Increased Lisinopril to 40 mg daily  - Added amlodipine 10 mg daily   - Continue to monitor Blood pressure

## 2023-05-12 NOTE — ED PROVIDER NOTES
History  Chief Complaint   Patient presents with   • Alcohol Intoxication     Pt states that he drank half a bottle of vodka      67 yo male found on floor outside elevator of his apartment building by neighbor who called EMS  Pt  Couldn't get up or walk  Pt  Admits he drank too much  He c/o being hungry and hasn't eaten all day  He denies head pain, neck pain, chest pain, belly pain  He was noted to have wound on right arm  He is not sure how he fell or if he passed out  He denies recent illness  History provided by:  Patient   used: No    Alcohol Intoxication  Associated symptoms: no vomiting        Prior to Admission Medications   Prescriptions Last Dose Informant Patient Reported? Taking? Blood Glucose Monitoring Suppl (ONE TOUCH ULTRA MINI) w/Device KIT   Yes No   Sig: Use as directed   Breo Ellipta 200-25 MCG/ACT inhaler   No No   Sig: INHALE 1 PUFF DAILY RINSE MOUTH AFTER USE     ONETOUCH DELICA LANCETS FINE MISC   Yes No   Sig: 3 (three) times a day Test   Thiamine HCl (vitamin B-1) 100 MG TABS   No No   Sig: TAKE 1 TABLET DAILY   albuterol (2 5 mg/3 mL) 0 083 % nebulizer solution   No No   Sig: Take 3 mL (2 5 mg total) by nebulization every 6 (six) hours as needed for wheezing or shortness of breath   albuterol (Ventolin HFA) 90 mcg/act inhaler   No No   Sig: Inhale 2 puffs every 4 (four) hours as needed for wheezing   carvedilol (COREG) 12 5 mg tablet   No No   Sig: Take 1 tablet (12 5 mg total) by mouth 2 (two) times a day with meals   ferrous sulfate 325 (65 Fe) mg tablet   No No   Sig: Take 1 tablet (325 mg total) by mouth 2 (two) times a day with meals   folic acid (FOLVITE) 1 mg tablet   No No   Sig: TAKE 1 TABLET DAILY   gabapentin (NEURONTIN) 300 mg capsule   No No   Sig: TAKE ONE CAPSULE THREE TIMES DAILY   lisinopril (ZESTRIL) 20 mg tablet   No No   Sig: Take 1 tablet (20 mg total) by mouth daily   magnesium Oxide (MAG-OX) 400 mg TABS   No No   Sig: TAKE 1 TABLET TWO TIMES A DAY   metFORMIN (GLUCOPHAGE) 500 mg tablet   No No   Sig: TAKE 1 TABLET DAILY WITH BREAKFAST   pantoprazole (PROTONIX) 40 mg tablet   No No   Sig: Take 1 tablet (40 mg total) by mouth daily in the early morning Do not start before November 26, 2022  Patient taking differently: Take 40 mg by mouth 2 (two) times a day as needed   pravastatin (PRAVACHOL) 40 mg tablet   No No   Sig: TAKE 1 TABLET DAILY WITH DINNER   prednisoLONE acetate (PRED FORTE) 1 % ophthalmic suspension   Yes No   ranolazine (RANEXA) 500 mg 12 hr tablet   No No   Sig: TAKE 1 TABLET EVERY 12 HOURS   tamsulosin (FLOMAX) 0 4 mg   No No   Sig: TAKE 1 CAPSULE DAILY      Facility-Administered Medications: None       Past Medical History:   Diagnosis Date   • Acute on chronic kidney failure (HCC)    • Alcohol withdrawal ([facility] ) 6/7/2019   • Cancer ([facility] )     prostate ca,had radiation   • Cardiac disease     stents,then triple bypass   • COPD (chronic obstructive pulmonary disease) ([facility] )    • ETOH abuse    • Hx of heart artery stent     2014   • Hyperlipidemia    • Hypertension    • Hypovolemic shock ([facility] ) 12/22/2019   • Lumbar spondylitis ([facility] ) 10/13/2022   • Nasal bone fracture 10/10/2022   • Prostate CA ([facility] )    • S/P CABG x 1     2004       Past Surgical History:   Procedure Laterality Date   • CORONARY ARTERY BYPASS GRAFT  2004   • NC ARTHRD ANT INTERBODY  [facility] CRV BELOW C2 N/A 12/16/2020    Procedure: Anterior cervical discectomy with fusion C4-C7; Posterior cervical decompression and fusion C2-T2;  Surgeon: Kajal Peck MD;  Location: [facility]IN OR;  Service: Neurosurgery   • TONSILLECTOMY         Family History   Problem Relation Age of Onset   • Diabetes Mother    • Uterine cancer Mother    • COPD Father    • Hypertension Father      I have reviewed and agree with the history as documented      E-Cigarette/Vaping   • E-Cigarette Use Never User      E-Cigarette/Vaping Substances   • Nicotine Yes    • THC No    • CBD No    • Flavoring No • Other No    • Unknown No      Social History     Tobacco Use   • Smoking status: Every Day     Packs/day: 1 00     Years: 40 00     Pack years: 40 00     Types: Cigarettes   • Smokeless tobacco: Never   Vaping Use   • Vaping Use: Never used   Substance Use Topics   • Alcohol use: Yes     Alcohol/week: 4 0 standard drinks     Types: 4 Standard drinks or equivalent per week     Comment: daily   • Drug use: No       Review of Systems   Constitutional: Negative for fever  Respiratory: Negative for cough  Cardiovascular: Negative for chest pain  Gastrointestinal: Negative for diarrhea and vomiting  Musculoskeletal: Negative for back pain and neck pain  Skin: Positive for wound  All other systems reviewed and are negative  Physical Exam  Physical Exam  Vitals and nursing note reviewed  Constitutional:       General: He is not in acute distress  Appearance: He is well-developed  He is ill-appearing  He is not diaphoretic  Comments: Appears intoxicated   HENT:      Head: Normocephalic and atraumatic  Right Ear: Tympanic membrane normal       Left Ear: Tympanic membrane normal       Nose: Nose normal       Mouth/Throat:      Mouth: Mucous membranes are moist    Eyes:      General: No scleral icterus  Extraocular Movements: Extraocular movements intact  Conjunctiva/sclera: Conjunctivae normal       Pupils: Pupils are equal, round, and reactive to light  Cardiovascular:      Rate and Rhythm: Normal rate and regular rhythm  Heart sounds: Normal heart sounds  No murmur heard  Pulmonary:      Effort: Pulmonary effort is normal  No respiratory distress  Breath sounds: Normal breath sounds  Chest:      Chest wall: No tenderness  Abdominal:      General: Bowel sounds are normal  There is no distension  Palpations: Abdomen is soft  Tenderness: There is no abdominal tenderness  Musculoskeletal:         General: Signs of injury present   No tenderness or deformity  Normal range of motion  Cervical back: Normal range of motion and neck supple  No tenderness  Right lower leg: No edema  Left lower leg: No edema  Comments: + skin tear right elbow   Skin:     General: Skin is warm and dry  Coloration: Skin is not pale  Findings: No erythema or rash  Neurological:      General: No focal deficit present  Mental Status: He is alert  Cranial Nerves: No cranial nerve deficit  Motor: No weakness     Psychiatric:         Mood and Affect: Mood normal          Behavior: Behavior normal          Vital Signs  ED Triage Vitals [05/11/23 1952]   Temperature Pulse Respirations Blood Pressure SpO2   98 3 °F (36 8 °C) 86 18 115/62 96 %      Temp Source Heart Rate Source Patient Position - Orthostatic VS BP Location FiO2 (%)   Oral Monitor Lying Left arm --      Pain Score       No Pain           Vitals:    05/11/23 1952   BP: 115/62   Pulse: 86   Patient Position - Orthostatic VS: Lying         Visual Acuity      ED Medications  Medications   sodium chloride 0 9 % bolus 500 mL (500 mL Intravenous New Bag 5/11/23 2048)   bacitracin topical ointment 1 small application (1 small application Topical Given 5/11/23 2048)       Diagnostic Studies  Results Reviewed     Procedure Component Value Units Date/Time    Ethanol [[id number]]  (Abnormal) Collected: 05/11/23 2048    Lab Status: Final result Specimen: Blood from Arm, Right Updated: 05/11/23 2115     Ethanol Lvl 367 mg/dL     Comprehensive metabolic panel [[id number]]  (Abnormal) Collected: 05/11/23 2048    Lab Status: Final result Specimen: Blood from Arm, Right Updated: 05/11/23 2115     Sodium 127 mmol/L      Potassium 4 5 mmol/L      Chloride 92 mmol/L      CO2 24 mmol/L      ANION GAP 11 mmol/L      BUN 25 mg/dL      Creatinine 1 13 mg/dL      Glucose 115 mg/dL      Calcium 8 4 mg/dL      AST 31 U/L      ALT 22 U/L      Alkaline Phosphatase 59 U/L      Total Protein 7 2 g/dL      Albumin 4 0 g/dL      Total Bilirubin 0 36 mg/dL      eGFR 69 ml/min/1 73sq m     Narrative:      [place] guidelines for Chronic Kidney Disease (CKD):   •  Stage 1 with normal or high GFR (GFR > 90 mL/min/1 73 square meters)  •  Stage 2 Mild CKD (GFR = 60-89 mL/min/1 73 square meters)  •  Stage 3A Moderate CKD (GFR = 45-59 mL/min/1 73 square meters)  •  Stage 3B Moderate CKD (GFR = 30-44 mL/min/1 73 square meters)  •  Stage 4 Severe CKD (GFR = 15-29 mL/min/1 73 square meters)  •  Stage 5 End Stage CKD (GFR <15 mL/min/1 73 square meters)  Note: GFR calculation is accurate only with a steady state creatinine    CBC and differential [[id number]]  (Abnormal) Collected: 05/11/23 2048    Lab Status: Final result Specimen: Blood from Arm, Right Updated: 05/11/23 2103     WBC 7 05 Thousand/uL      RBC 3 80 Million/uL      Hemoglobin 13 1 g/dL      Hematocrit 36 9 %      MCV 97 fL      MCH 34 5 pg      MCHC 35 5 g/dL      RDW 15 1 %      MPV 9 4 fL      Platelets 634 Thousands/uL      nRBC 0 /100 WBCs      Neutrophils Relative 67 %      Immat GRANS % 0 %      Lymphocytes Relative 15 %      Monocytes Relative 16 %      Eosinophils Relative 1 %      Basophils Relative 1 %      Neutrophils Absolute 4 68 Thousands/µL      Immature Grans Absolute 0 02 Thousand/uL      Lymphocytes Absolute 1 07 Thousands/µL      Monocytes Absolute 1 15 Thousand/µL      Eosinophils Absolute 0 07 Thousand/µL      Basophils Absolute 0 06 Thousands/µL                  CT head without contrast   Final Result by Adarsh Posada DO (05/11 2120)      No acute intracranial abnormality  Workstation performed: [id number]         CT cervical spine without contrast   Final Result by Adarsh Posada DO (05/11 2128)      No cervical spine fracture or traumatic malalignment                    Workstation performed: [id number]                    Procedures  Procedures         ED Course                               SBIRT 20yo+ Flowsheet Row Most Recent Value   Initial Alcohol Screen: US AUDIT-C     1  How often do you have a drink containing alcohol? 6 Filed at: 05/11/2023 1956   2  How many drinks containing alcohol do you have on a typical day you are drinking? 6 Filed at: 05/11/2023 1956   3a  Male UNDER 65: How often do you have five or more drinks on one occasion? 6 Filed at: 05/11/2023 1956   Audit-C Score 18 Filed at: 05/11/2023 1956   Full Alcohol Screen: US AUDIT    4  How often during the last year have you found that you were not able to stop drinking once you had started? 4 Filed at: 05/11/2023 1956   5  How often during past year have you failed to do what was normally expected of you because of drinking? 4 Filed at: 05/11/2023 1956   6  How often in past year have you needed a first drink in the morning to get yourself going after a heavy drinking session? 4 Filed at: 05/11/2023 1956   7  How often in past year have you had feeling of guilt or remorse after drinking? 4 Filed at: 05/11/2023 1956   8  How often in past year have you been unable to remember what happened night before because you had been drinking? 4 Filed at: 05/11/2023 1956   9  Have you or someone else been injured as a result of your drinking? 4 Filed at: 05/11/2023 1956   10  Has a relative, friend, doctor or other health worker been concerned about your drinking and suggested you cut down? 4 Filed at: 05/11/2023 1956   AUDIT Total Score 46 Filed at: 05/11/2023 1956   BRIGIDA: How many times in the past year have you    Used an illegal drug or used a prescription medication for non-medical reasons? Never Filed at: 05/11/2023 1956                    Medical Decision Making  Pt  Offered detox admission to [facility] or to talk to [facility] but pt  Refuses  He says he already has a 30 day admission scheduled for June in order to get his license back and he wants to wait for that  2130 - CT head and cervical no acute injury    Sodium level low at 127, had been normal January 2023  Alcohol 367  Will admit  Discussed with [facility]  Pt  Advised  Amount and/or Complexity of Data Reviewed  Labs: ordered  Radiology: ordered  Risk  OTC drugs  Decision regarding hospitalization  Disposition  Final diagnoses:   Alcohol intoxication ([facility] )   Hyponatremia     Time reflects when diagnosis was documented in both MDM as applicable and the Disposition within this note     Time User Action Codes Description Comment    0/20/4996  0:06 PM Yadira Enciso Add [Z92 490] Alcohol intoxication ([facility] )     9/30/0800  0:64 PM Ricardo CALDERON Add [D20 1] Hyponatremia       ED Disposition     ED Disposition   Admit    Condition   Stable    Date/Time   Thu May 11, 2023  9:38 PM    Comment   Case was discussed with *[facility]** and the patient's admission status was agreed to be Admission Status: observation status           Follow-up Information    None         Patient's Medications   Discharge Prescriptions    No medications on file       No discharge procedures on file      PDMP Review       Value Time User    PDMP Reviewed  Yes 7/26/2021  4:50 PM Opal Rushing MD          ED Provider  Electronically Signed by           Casper Kent MD  67/79/30 [id number]

## 2023-05-12 NOTE — ASSESSMENT & PLAN NOTE
Wt Readings from Last 3 Encounters:   05/12/23 91 7 kg (202 lb 2 6 oz)   02/10/23 94 8 kg (209 lb)   01/27/23 94 6 kg (208 lb 8 oz)     Chronic, stable   Does not appear fluid overloaded on exam  Stress test 11/2022 showed EF 44%  - Continue home meds  - Daily Wts, I/O's

## 2023-05-12 NOTE — ASSESSMENT & PLAN NOTE
Acute   Na 127 on admission likely hypovolemic secondary to poor PO intake   Increased to 133 this morning    - IVF NS at 75 ml/hr  - Trend BMP

## 2023-05-12 NOTE — PLAN OF CARE
Problem: PAIN - ADULT  Goal: Verbalizes/displays adequate comfort level or baseline comfort level  Description: Interventions:  - Encourage patient to monitor pain and request assistance  - Assess pain using appropriate pain scale  - Administer analgesics based on type and severity of pain and evaluate response  - Implement non-pharmacological measures as appropriate and evaluate response  - Consider cultural and social influences on pain and pain management  - Notify physician/advanced practitioner if interventions unsuccessful or patient reports new pain  Outcome: Progressing     Problem: INFECTION - ADULT  Goal: Absence or prevention of progression during hospitalization  Description: INTERVENTIONS:  - Assess and monitor for signs and symptoms of infection  - Monitor lab/diagnostic results  - Monitor all insertion sites, i e  indwelling lines, tubes, and drains  - Instruct and encourage patient and family to use good hand hygiene technique  - Identify and instruct in appropriate isolation precautions for identified infection/condition  Outcome: Progressing  Goal: Absence of fever/infection during neutropenic period  Description: INTERVENTIONS:  - Monitor WBC    Outcome: Progressing     Problem: SAFETY ADULT  Goal: Patient will remain free of falls  Description: INTERVENTIONS:  - Educate patient/family on patient safety including physical limitations  - Instruct patient to call for assistance with activity   - Consult OT/PT to assist with strengthening/mobility   - Keep Call bell within reach  - Keep bed low and locked with side rails adjusted as appropriate  - Keep care items and personal belongings within reach  - Initiate and maintain comfort rounds  - Make Fall Risk Sign visible to staff  - Offer Toileting every 2 Hours, in advance of need  - Initiate/Maintain bed alarm  - Obtain necessary fall risk management equipment: N/A  - Apply yellow socks and bracelet for high fall risk patients  - Consider moving patient to room near nurses station  Outcome: Progressing  Goal: Maintain or return to baseline ADL function  Description: INTERVENTIONS:  -  Assess patient's ability to carry out ADLs; assess patient's baseline for ADL function and identify physical deficits which impact ability to perform ADLs (bathing, care of mouth/teeth, toileting, grooming, dressing, etc )  - Assess/evaluate cause of self-care deficits   - Assess range of motion  - Assess patient's mobility; develop plan if impaired  - Assess patient's need for assistive devices and provide as appropriate  - Encourage maximum independence but intervene and supervise when necessary  - Involve family in performance of ADLs  - Assess for home care needs following discharge   - Consider OT consult to assist with ADL evaluation and planning for discharge  - Provide patient education as appropriate  Outcome: Progressing  Goal: Maintains/Returns to pre admission functional level  Description: INTERVENTIONS:  - Perform BMAT or MOVE assessment daily    - Set and communicate daily mobility goal to care team and patient/family/caregiver  - Collaborate with rehabilitation services on mobility goals if consulted  - Perform Range of Motion 3 times a day  - Reposition patient every 2 hours    - Dangle patient 3 times a day  - Stand patient 3 times a day  - Ambulate patient 3 times a day  - Out of bed to chair 3 times a day   - Out of bed for meals 3 times a day  - Out of bed for toileting  - Record patient progress and toleration of activity level   Outcome: Progressing     Problem: DISCHARGE PLANNING  Goal: Discharge to home or other facility with appropriate resources  Description: INTERVENTIONS:  - Identify barriers to discharge w/patient and caregiver  - Arrange for needed discharge resources and transportation as appropriate  - Identify discharge learning needs (meds, wound care, etc )  - Refer to Case Management Department for coordinating discharge planning if the patient needs post-hospital services based on physician/advanced practitioner order or complex needs related to functional status, cognitive ability, or social support system  Outcome: Progressing     Problem: Knowledge Deficit  Goal: Patient/family/caregiver demonstrates understanding of disease process, treatment plan, medications, and discharge instructions  Description: Complete learning assessment and assess knowledge base    Interventions:  - Provide teaching at level of understanding  - Provide teaching via preferred learning methods  Outcome: Progressing

## 2023-05-12 NOTE — PHYSICAL THERAPY NOTE
Pt declined PT evaluation 2x today, reports he is too tired  Will re-attempt 5/13/23    Pilo Todd PT  [id number]     05/12/23 6770   Note Type   Note type Cancelled Session   Cancel Reasons Refusal

## 2023-05-12 NOTE — UTILIZATION REVIEW
Initial Clinical Review    Admission: Date/Time/Statement:   Admission Orders (From admission, onward)     Ordered        05/11/23 2141  Place in Observation  Once                      Orders Placed This Encounter   Procedures   • Place in Observation     Standing Status:   Standing     Number of Occurrences:   1     Order Specific Question:   Level of Care     Answer:   Med Surg [16]     ED Arrival Information     Expected   -    Arrival   5/11/2023 19:44    Acuity   Urgent            Means of arrival   Ambulance    Escorted by   [address]    Admission type   Emergency            Arrival complaint   intoxication              Chief Complaint   Patient presents with   • Alcohol Intoxication     Pt states that he drank half a bottle of vodka        Initial Presentation:   66 yom to ER from home via EMS found on floor outside elevator of his apartment building by neighbor, unable to get up  Admits to drinking 1/2 bottle vodka, not sure if he fell or passed out  Hx chronic alcohol use, hypertension, hyperlipidemia, COPD, CAD  Presents intoxicated, skin tear R arm  Admission work-up showing hyponatremia, alcohol level 367  Placed in observation status for alcohol intoxication  Declined detox admission  Admission CIWA score 5 for tremors    5/12/23- observation:  Mild tremors with outstretched arms  IV Ativan x1  Started on Norvasc for elevated BP  IVF maintained       CIWA scores: 7-5-6 for N&V, tremors, anxiety, headache    ED Triage Vitals [05/11/23 1952]   Temperature Pulse Respirations Blood Pressure SpO2   98 3 °F (36 8 °C) 86 18 115/62 96 %      Temp Source Heart Rate Source Patient Position - Orthostatic VS BP Location FiO2 (%)   Oral Monitor Lying Left arm --      Pain Score       No Pain          Wt Readings from Last 1 Encounters:   05/12/23 91 7 kg (202 lb 2 6 oz)     Additional Vital Signs:   Date/Time Temp Pulse Resp BP MAP (mmHg) SpO2 O2 Device Patient Position - Orthostatic VS   05/12/23 1500 99 7 °F (37 6 °C) 105 -- 186/96 Abnormal  -- 91 % None (Room air) --   05/12/23 1212 -- -- -- 182/92 Abnormal  -- -- -- --   05/12/23 11:35:09 -- 110 Abnormal  -- 197/114 Abnormal  142 90 % -- --   05/12/23 08:54:29 -- 105 -- 176/96 Abnormal  123 90 % -- --   05/12/23 0853 -- -- -- 176/96 Abnormal  -- -- -- --   05/12/23 08:03:47 -- 111 Abnormal  -- 183/103 Abnormal  130 89 % Abnormal  -- --   05/12/23 07:08:51 98 5 °F (36 9 °C) 113 Abnormal  19 215/122 Abnormal  153 92 % None (Room air) --   05/12/23 03:34:16 -- 108 Abnormal  18 166/85 112 92 % -- --   05/11/23 23:00:03 97 9 °F (36 6 °C) 91 17 129/72 91 92 % -- --   05/11/23 2215 98 1 °F (36 7 °C) 89 18 147/71 102 94 % None (Room air) Lying   05/11/23 1952 98 3 °F (36 8 °C) 86 18 115/62 -- 96 % None (Room air) Lying     Pertinent Labs/Diagnostic Test Results:   CT head without contrast   Final Result  (05/11 2120)      No acute intracranial abnormality  CT cervical spine without contrast   Final Result  (05/11 2128)      No cervical spine fracture or traumatic malalignment            Results from last 7 days   Lab Units 05/12/23  0442 05/11/23 2048   WBC Thousand/uL 4 62 7 05   HEMOGLOBIN g/dL 12 6 13 1   HEMATOCRIT % 36 5 36 9   PLATELETS Thousands/uL 129* 150   NEUTROS ABS Thousands/µL 2 48 4 68         Results from last 7 days   Lab Units 05/12/23  0442 05/11/23 2048   SODIUM mmol/L 133* 127*   POTASSIUM mmol/L 4 5 4 5   CHLORIDE mmol/L 99 92*   CO2 mmol/L 25 24   ANION GAP mmol/L 9 11   BUN mg/dL 26* 25   CREATININE mg/dL 1 27 1 13   EGFR ml/min/1 73sq m 60 69   CALCIUM mg/dL 8 4 8 4     Results from last 7 days   Lab Units 05/12/23  0442 05/11/23  2048   AST U/L 28 31   ALT U/L 20 22   ALK PHOS U/L 56 59   TOTAL PROTEIN g/dL 6 6 7 2   ALBUMIN g/dL 3 6 4 0   TOTAL BILIRUBIN mg/dL 0 31 0 36         Results from last 7 days   Lab Units 05/12/23  0442 05/11/23 2048   GLUCOSE RANDOM mg/dL 139 115     Results from last 7 days   Lab Units 05/11/23 2048   ETHANOL LVL mg/dL 367*     ED Treatment:   Medication Administration from 05/11/2023 1944 to 05/11/2023 2244       Date/Time Order Dose Route Action     05/11/2023 2048 EDT sodium chloride 0 9 % bolus 500 mL 500 mL Intravenous New Bag     05/11/2023 2048 EDT bacitracin topical ointment 1 small application 1 small application Topical Given     05/11/2023 2221 EDT sodium chloride 0 9 % infusion 75 mL/hr Intravenous New Bag        Past Medical History:   Diagnosis Date   • Acute on chronic kidney failure (HCC)    • Alcohol withdrawal ([facility] ) 6/7/2019   • Cancer ([facility] )     prostate ca,had radiation   • Cardiac disease     stents,then triple bypass   • COPD (chronic obstructive pulmonary disease) ([facility] )    • ETOH abuse    • Hx of heart artery stent     2014   • Hyperlipidemia    • Hypertension    • Hypovolemic [facility] ) 12/22/2019   • Lumbar spondylitis ([facility] ) 10/13/2022   • Nasal bone fracture 10/10/2022   • Prostate CA ([facility] )    • S/P CABG x 1 2004     Present on Admission:  • Essential hypertension  • Dyslipidemia  • Hyponatremia  • Chronic heart failure with preserved ejection fraction (HCC)  • Stage 3a chronic kidney disease (HCC)  • COPD (chronic obstructive pulmonary disease) (HCC)  • Type 2 diabetes mellitus (HCC)  • CAD (coronary artery disease)  • Nicotine abuse      Admitting Diagnosis: Alcohol intoxication ([facility] ) [F10 929]  Hyponatremia [E87 1]  Age/Sex: 66 y o  male  Admission Orders:  Cont pulse ox  CIWA protocol  Pt/ot eval & tx  Scd/foot pumps    Scheduled Medications:  Medications 05/03 05/04 05/05 05/06 05/07 05/08 05/09 05/10 05/11 05/12   amLODIPine (NORVASC) tablet 5 mg  Dose: 5 mg  Freq: Daily Route: PO  Start: 05/12/23 1230   Admin Instructions:   LOOK ALIKE SOUND ALIKE MED   Order specific questions:   Hold for systolic blood pressure less than (mmHg) 110             1254        ascorbic acid (VITAMIN C) tablet 500 mg  Dose: 500 mg  Freq: Daily Route: PO  Start: 05/12/23 0076 1030        bacitracin topical ointment 1 small application  Dose: 1 small application  Freq: Once Route: TP  Start: 05/11/23 2030 End: 05/11/23 2048   Admin Instructions: For topical use ONLY   Order specific questions:   What is the location for this topical application? right arm skin tear            2048         carvedilol (COREG) tablet 12 5 mg  Dose: 12 5 mg  Freq: 2 times daily with meals Route: PO  Start: 05/12/23 0730   Admin Instructions:   Hold for heart rate less than 50 beats per minute  Look alike sound alike  Order specific questions:   Hold for systolic blood pressure less than (mmHg) 110             0712     1630        enoxaparin (LOVENOX) subcutaneous injection 40 mg  Dose: 40 mg  Freq: Daily Route: SC  Start: 05/12/23 0900   Admin Instructions:   High Alert Medication, Check for allergies to pork or pork derivatives/dietary restrictions before administration  LOOK ALIKE SOUND ALIKE MED             5579        ferrous sulfate tablet 325 mg  Dose: 325 mg  Freq: Daily with breakfast Route: PO  Start: 05/13/23 0730   Admin Instructions:   Food and drug interaction, teaching and documentation must be done; Administer 2 hours before or 4 hours after antacids; Avoid administration w/ cereals,dietary fiber,tea,coffee,eggs,or milk  ferrous sulfate tablet 325 mg  Dose: 325 mg  Freq: 2 times daily with meals Route: PO  Start: 05/12/23 0730 End: 05/12/23 0919   Admin Instructions:   Food and drug interaction, teaching and documentation must be done; Administer 2 hours before or 4 hours after antacids; Avoid administration w/ cereals,dietary fiber,tea,coffee,eggs,or milk  1027     0919-D/C'd      fluticasone-vilanterol 200-25 mcg/actuation 1 puff  Dose: 1 puff  Freq: Daily Route: IN  Start: 05/11/23 2230   Admin Instructions:   Rinse mouth after use              5684 0582        folic acid (FOLVITE) tablet 1 mg  Dose: 1 mg  Freq: Daily Route: PO  Start: 05/12/23 0900 7632        gabapentin (NEURONTIN) capsule 100 mg  Dose: 100 mg  Freq: 3 times daily Route: PO  Start: 05/12/23 0930   Admin Instructions:   LOOK ALIKE SOUND ALIKE MED             1030     1600     2100        lisinopril (ZESTRIL) tablet 20 mg  Dose: 20 mg  Freq: Once Route: PO  Start: 05/12/23 0845 End: 05/12/23 0853   Admin Instructions:   LOOK ALIKE SOUND ALIKE MED   Order specific questions:   Hold for systolic blood pressure less than (mmHg) 110             0853        lisinopril (ZESTRIL) tablet 20 mg  Dose: 20 mg  Freq: Daily Route: PO  Start: 05/12/23 0900 End: 05/12/23 0840   Admin Instructions:   LOOK ALIKE SOUND ALIKE MED   Order specific questions:   Hold for systolic blood pressure less than (mmHg) 110             0807     0840-D/C'd      lisinopril (ZESTRIL) tablet 40 mg  Dose: 40 mg  Freq: Daily Route: PO  Start: 05/13/23 0900   Admin Instructions:   LOOK ALIKE SOUND ALIKE MED   Order specific questions:   Hold for systolic blood pressure less than (mmHg) 110                LORazepam (ATIVAN) injection 0 5 mg  Dose: 0 5 mg  Freq: Once Route: IV  Start: 05/11/23 2254 End: 05/11/23 2313   Admin Instructions:   High Alert Medication  LOOK ALIKE SOUND ALIKE MED            2313         LORazepam (ATIVAN) injection 1 mg  Dose: 1 mg  Freq: Once Route: IV  Start: 05/12/23 1230 End: 05/12/23 1254   Admin Instructions:   High Alert Medication  LOOK ALIKE SOUND ALIKE MED             1254        metFORMIN (GLUCOPHAGE) tablet 500 mg  Dose: 500 mg  Freq: Daily with breakfast Route: PO  Start: 05/12/23 0730   Admin Instructions:   High Alert Medication   LOOK ALIKE SOUND ALIKE MED             7763        multivitamin-minerals (CENTRUM) tablet 1 tablet  Dose: 1 tablet  Freq: Daily Route: PO  Start: 05/12/23 0900   Admin Instructions:   **DISPOSE IN 8 GALLON BLACK CONTAINER**             0808        naltrexone (REVIA) tablet 50 mg  Dose: 50 mg  Freq: Daily Route: PO  Start: 05/12/23 1230             1230 pantoprazole (PROTONIX) EC tablet 40 mg  Dose: 40 mg  Freq: Daily (early morning) Route: PO  Start: 05/12/23 0600   Admin Instructions:   Swallow whole; do not crush, chew or split  LOOK ALIKE SOUND ALIKE MED             3609        pneumococcal 20-adilia conj vacc (PREVNAR 20) IM Injection 0 5 mL  Dose: 0 5 mL  Freq: Once Route: IM  Start: 05/12/23 0900 End: 05/12/23 0817   Admin Instructions:   Shake well before use  Administer only IM into the anterolateral thigh or deltoid  Refrigerate             0817        pravastatin (PRAVACHOL) tablet 40 mg  Dose: 40 mg  Freq: Daily with dinner Route: PO  Start: 05/11/23 2230            2313      1630        ranolazine (RANEXA) 12 hr tablet 500 mg  Dose: 500 mg  Freq: Every 12 hours Route: PO  Start: 05/11/23 2230   Admin Instructions:   Swallow whole; do not crush, chew or split            2314      0810     2100        sodium chloride 0 9 % bolus 500 mL  Dose: 500 mL  Freq:  Once Route: IV  Last Dose: Stopped (05/11/23 2145)  Start: 05/11/23 2030 End: 05/11/23 2145 2048 2145         tamsulosin (FLOMAX) capsule 0 4 mg  Dose: 0 4 mg  Freq: Daily with dinner Route: PO  Start: 05/11/23 2230   Admin Instructions:   Swallow whole; do not crush, chew, or open            2313      1630        thiamine tablet 100 mg  Dose: 100 mg  Freq: Daily Route: PO  Start: 05/12/23 0900             0808        Medications 05/03 05/04 05/05 05/06 05/07 05/08 05/09 05/10 05/11 05/12         Continuous Meds Sorted by Name  for Tone De La Torre as of 05/12/23 1512  Legend:                          Inactive     Active     Other Encounter     Linked                 Medications 05/03 05/04 05/05 05/06 05/07 05/08 05/09 05/10 05/11 05/12   sodium chloride 0 9 % infusion  Rate: 75 mL/hr Dose: 75 mL/hr  Freq: Continuous Route: IV  Last Dose: 75 mL/hr (05/11/23 2221)  Start: 05/11/23 2200 2221               PRN Meds Sorted by Name  for Tone De La Torre as of 05/12/23 [address]:                          Inactive     Active     Other Encounter     Linked                 Medications 05/03 05/04 05/05 05/06 05/07 05/08 05/09 05/10 05/11 05/12   acetaminophen (TYLENOL) tablet 650 mg  Dose: 650 mg  Freq: Every 6 hours PRN Route: PO  PRN Reason: mild pain  Indications of Use: FEVER,HEADACHE,MILD PAIN  Start: 05/11/23 2159                albuterol (PROVENTIL HFA,VENTOLIN HFA) inhaler 2 puff  Dose: 2 puff  Freq: Every 4 hours PRN Route: IN  PRN Reason: wheezing  Start: 05/11/23 2227   Admin Instructions:   USE WITH SPACER  Shake well before use  SEAL LEFTOVER/UNUSED MEDICATION IN A ZIP LOCK BAG AND SEND TO PHARMACY            2313         diazepam (VALIUM) tablet 2 mg  Dose: 2 mg  Freq: Once as needed Route: PO  PRN Reason: anxiety  Start: 05/12/23 0021   Admin Instructions:   High alert medication  Network Utilization Review Department  ATTENTION: Please call with any questions or concerns to [phone] and carefully listen to the prompts so that you are directed to the right person  All voicemails are confidential   Kevin Fines all requests for admission clinical reviews, approved or denied determinations and any other requests to dedicated fax number below belonging to the campus where the patient is receiving treatment   List of dedicated fax numbers for the Facilities:  [address] DENIALS (Administrative/Medical Necessity) [phone]   [address] [place] [address] (Maternity/NICU/Pediatrics) [phone]   [address] [phone]   [facility] [phone]   [facility] [phone]   [facility] [place] [address] [facility] [place] [address] [place] [address] [place] [address] Brigida Blanco [facility] [phone]   [facility] [address] Elena [facility] [place] [address] [phone]

## 2023-05-12 NOTE — ASSESSMENT & PLAN NOTE
Lab Results   Component Value Date    HGBA1C 5 4 01/27/2023       No results for input(s): POCGLU in the last 72 hours      Blood Sugar Average: Last 72 hrs:     - Level 2 Carb controlled diet  - Continue Metformin 500 mg daily

## 2023-05-12 NOTE — PROGRESS NOTES
[address] [facility] 67 [address] 84 [facility] Practice Progress Note - Kathleen Brush 66 y o  male MRN: [id number]    Unit/Bed#: 2 [place] [address] Encounter: [id number]      Assessment/Plan:  * Alcohol intoxication [facility])  Assessment & Plan  Presented with etoh 367 on admission after consuming half a bottle of vodka prior to admission  History of chronic alcohol abuse drinks a quart of vodka daily  Mild tremor noted on exam  Administered 0 5 ativan @ 11 AM    - WA protocol  - Fall precautions  - Monitor for withdrawal  - Continue Thiamine, Folate, ferrous sulfate    Essential hypertension  Assessment & Plan  Chronic, stable  Home Coreg 12 5 mg BID and Lisinopril 20 mg daily  Pt experienced episode of hypertensive urgency morning of 5/12 which slowly decreased after home medications were administered in admission to 20 mg Lisinopril    - Continue home Coreg 12 5 mg BID   - Increased Lisinopril to 40 mg daily  - Continue to monitor Blood pressure    Stage 3a chronic kidney disease [facility])  Assessment & Plan  Lab Results   Component Value Date    EGFR 69 05/11/2023    EGFR 77 01/17/2023    EGFR 77 11/25/2022    CREATININE 1 13 05/11/2023    CREATININE 1 04 01/17/2023    CREATININE 1 04 11/25/2022     Chronic, stable  - Avoid nephrotoxins  - I/O's    Chronic heart failure with preserved ejection fraction [facility])  Assessment & Plan  Wt Readings from Last 3 Encounters:   02/10/23 94 8 kg (209 lb)   01/27/23 94 6 kg (208 lb 8 oz)   01/17/23 92 1 kg (203 lb)     Chronic, stable  Does not appear fluid overloaded on exam  Stress test 11/2022 showed EF 44%  - Continue home meds  - Daily Wts, I/O's    Hyponatremia  Assessment & Plan  Acute   Na 127 on admission likely hypovolemic secondary to poor PO intake   Increased to 133 this morning    - IVF NS at 75 ml/hr  - Trend BMP    Nicotine abuse  Assessment & Plan  Chronic, smokes 1 PPD  - Nicotine patch ordered    CAD (coronary artery disease)  Assessment & Plan  Chronic s/p CABG  - Continue home Coreg 12 5 mg BID, "Ranexa 500 mg BID    Dyslipidemia  Assessment & Plan  Chronic  - Continue Pravastatin 40 mg daily    Type 2 diabetes mellitus ([facility] [address] )  Assessment & Plan  Lab Results   Component Value Date    HGBA1C 5 4 01/27/2023       No results for input(s): POCGLU in the last 72 hours  Blood Sugar Average: Last 72 hrs:     - Level 2 Carb controlled diet  - Continue Metformin 500 mg daily    COPD (chronic obstructive pulmonary disease) ([facility])  Assessment & Plan  Chronic  Home inhalers include albuterol and Breo Ellipta  Examination showed diffuse rhonchi bilaterally  - Continue Breo Ellipta inhaler and albuterol PRN  - Monitor O2 sats        Subjective:   Patient seen and examined at bedside  Patient states that he would like to go home  Patient denies chest pain, shortness of breath, vomiting, diarrhea or any other acute complaints at this time  Objective:     Vitals: Blood pressure (!) 176/96, pulse 105, temperature 98 5 °F (36 9 °C), resp  rate 18, height 6' 2\" (1 88 m), weight 91 7 kg (202 lb 2 6 oz), SpO2 90 %  ,Body mass index is 25 96 kg/m²  Wt Readings from Last 3 Encounters:   05/12/23 91 7 kg (202 lb 2 6 oz)   02/10/23 94 8 kg (209 lb)   01/27/23 94 6 kg (208 lb 8 oz)       Intake/Output Summary (Last 24 hours) at 5/12/2023 1016  Last data filed at 5/12/2023 0901  Gross per 24 hour   Intake 1160 ml   Output 1800 ml   Net -640 ml       Physical Exam  Constitutional:       Appearance: Normal appearance  HENT:      Head: Normocephalic and atraumatic  Mouth/Throat:      Mouth: Mucous membranes are moist    Eyes:      General:         Right eye: No discharge  Left eye: No discharge  Conjunctiva/sclera: Conjunctivae normal    Cardiovascular:      Rate and Rhythm: Normal rate and regular rhythm  Pulses: Normal pulses  Heart sounds: Normal heart sounds  Pulmonary:      Effort: Pulmonary effort is normal  No respiratory distress  Breath sounds: Normal breath sounds     Abdominal:      " General: Bowel sounds are normal       Palpations: Abdomen is soft  Tenderness: There is no abdominal tenderness  Musculoskeletal:      Cervical back: Neck supple  Right lower leg: No edema  Left lower leg: No edema  Skin:     General: Skin is warm and dry  Capillary Refill: Capillary refill takes less than 2 seconds  Neurological:      Mental Status: He is alert        Comments: Mild tremors on outstretched hands           Recent Results (from the past 24 hour(s))   Comprehensive metabolic panel    Collection Time: 05/11/23  8:48 PM   Result Value Ref Range    Sodium 127 (L) 135 - 147 mmol/L    Potassium 4 5 3 5 - 5 3 mmol/L    Chloride 92 (L) 96 - 108 mmol/L    CO2 24 21 - 32 mmol/L    ANION GAP 11 4 - 13 mmol/L    BUN 25 5 - 25 mg/dL    Creatinine 1 13 0 60 - 1 30 mg/dL    Glucose 115 65 - 140 mg/dL    Calcium 8 4 8 4 - 10 2 mg/dL    AST 31 13 - 39 U/L    ALT 22 7 - 52 U/L    Alkaline Phosphatase 59 34 - 104 U/L    Total Protein 7 2 6 4 - 8 4 g/dL    Albumin 4 0 3 5 - 5 0 g/dL    Total Bilirubin 0 36 0 20 - 1 00 mg/dL    eGFR 69 ml/min/1 73sq m   CBC and differential    Collection Time: 05/11/23  8:48 PM   Result Value Ref Range    WBC 7 05 4 31 - 10 16 Thousand/uL    RBC 3 80 (L) 3 88 - 5 62 Million/uL    Hemoglobin 13 1 12 0 - 17 0 g/dL    Hematocrit 36 9 36 5 - 49 3 %    MCV 97 82 - 98 fL    MCH 34 5 (H) 26 8 - 34 3 pg    MCHC 35 5 31 4 - 37 4 g/dL    RDW 15 1 11 6 - 15 1 %    MPV 9 4 8 9 - 12 7 fL    Platelets 179 587 - 506 Thousands/uL    nRBC 0 /100 WBCs    Neutrophils Relative 67 43 - 75 %    Immat GRANS % 0 0 - 2 %    Lymphocytes Relative 15 14 - 44 %    Monocytes Relative 16 (H) 4 - 12 %    Eosinophils Relative 1 0 - 6 %    Basophils Relative 1 0 - 1 %    Neutrophils Absolute 4 68 1 85 - 7 62 Thousands/µL    Immature Grans Absolute 0 02 0 00 - 0 20 Thousand/uL    Lymphocytes Absolute 1 07 0 60 - 4 47 Thousands/µL    Monocytes Absolute 1 15 0 17 - 1 22 Thousand/µL    Eosinophils Absolute 0 07 0 00 - 0 61 Thousand/µL    Basophils Absolute 0 06 0 00 - 0 10 Thousands/µL   Ethanol    Collection Time: 05/11/23  8:48 PM   Result Value Ref Range    Ethanol Lvl 367 (H) <10 mg/dL   Comprehensive metabolic panel    Collection Time: 05/12/23  4:42 AM   Result Value Ref Range    Sodium 133 (L) 135 - 147 mmol/L    Potassium 4 5 3 5 - 5 3 mmol/L    Chloride 99 96 - 108 mmol/L    CO2 25 21 - 32 mmol/L    ANION GAP 9 4 - 13 mmol/L    BUN 26 (H) 5 - 25 mg/dL    Creatinine 1 27 0 60 - 1 30 mg/dL    Glucose 139 65 - 140 mg/dL    Glucose, Fasting 139 (H) 65 - 99 mg/dL    Calcium 8 4 8 4 - 10 2 mg/dL    AST 28 13 - 39 U/L    ALT 20 7 - 52 U/L    Alkaline Phosphatase 56 34 - 104 U/L    Total Protein 6 6 6 4 - 8 4 g/dL    Albumin 3 6 3 5 - 5 0 g/dL    Total Bilirubin 0 31 0 20 - 1 00 mg/dL    eGFR 60 ml/min/1 73sq m   CBC and differential    Collection Time: 05/12/23  4:42 AM   Result Value Ref Range    WBC 4 62 4 31 - 10 16 Thousand/uL    RBC 3 68 (L) 3 88 - 5 62 Million/uL    Hemoglobin 12 6 12 0 - 17 0 g/dL    Hematocrit 36 5 36 5 - 49 3 %    MCV 99 (H) 82 - 98 fL    MCH 34 2 26 8 - 34 3 pg    MCHC 34 5 31 4 - 37 4 g/dL    RDW 15 4 (H) 11 6 - 15 1 %    MPV 9 3 8 9 - 12 7 fL    Platelets 158 (L) 019 - 390 Thousands/uL    nRBC 0 /100 WBCs    Neutrophils Relative 54 43 - 75 %    Immat GRANS % 0 0 - 2 %    Lymphocytes Relative 19 14 - 44 %    Monocytes Relative 24 (H) 4 - 12 %    Eosinophils Relative 2 0 - 6 %    Basophils Relative 1 0 - 1 %    Neutrophils Absolute 2 48 1 85 - 7 62 Thousands/µL    Immature Grans Absolute 0 01 0 00 - 0 20 Thousand/uL    Lymphocytes Absolute 0 87 0 60 - 4 47 Thousands/µL    Monocytes Absolute 1 12 0 17 - 1 22 Thousand/µL    Eosinophils Absolute 0 08 0 00 - 0 61 Thousand/µL    Basophils Absolute 0 06 0 00 - 0 10 Thousands/µL       Current Facility-Administered Medications   Medication Dose Route Frequency Provider Last Rate Last Admin   • acetaminophen (TYLENOL) tablet 650 mg  650 mg Oral Q6H  Alie Ortiz MD       • albuterol (PROVENTIL HFA,VENTOLIN HFA) inhaler 2 puff  2 puff Inhalation Q4H PRN Htet Ema Sultana MD   2 puff at 05/11/23 2313   • ascorbic acid (VITAMIN C) tablet 500 mg  500 mg Oral Daily Prudbruce Edward MD       • carvedilol (COREG) tablet 12 5 mg  12 5 mg Oral BID With [address] Mila Leger MD   12 5 mg at 05/12/23 3359   • diazepam (VALIUM) tablet 2 mg  2 mg Oral Once  Alie Ortiz MD       • enoxaparin (LOVENOX) subcutaneous injection 40 mg  40 mg Subcutaneous Daily [address] MD Diana   40 mg at 05/12/23 0811   • [START ON 5/13/2023] ferrous sulfate tablet 325 mg  325 mg Oral Daily With Ted Mcwilliams MD       • fluticasone-vilanterol 200-25 mcg/actuation 1 puff  1 puff Inhalation Daily [address] MD Diana   1 puff at 09/33/87 0524   • folic acid (FOLVITE) tablet 1 mg  1 mg Oral Daily [address] MD Diana   1 mg at 05/12/23 4715   • gabapentin (NEURONTIN) capsule 100 mg  100 mg Oral TID Kiersten Self MD       • [START ON 5/13/2023] lisinopril (ZESTRIL) tablet 40 mg  40 mg Oral Daily Kiersten Self MD       • metFORMIN (GLUCOPHAGE) tablet 500 mg  500 mg Oral Daily With [address] [facility], MD   500 mg at 05/12/23 3685   • multivitamin-minerals (CENTRUM) tablet 1 tablet  1 tablet Oral Daily [address], MD   1 tablet at 05/12/23 5275   • pantoprazole (PROTONIX) EC tablet 40 mg  40 mg Oral Early Morning [address] MD Diana   40 mg at 05/12/23 0615   • pravastatin (PRAVACHOL) tablet 40 mg  40 mg Oral Daily With [facility], MD   40 mg at 05/11/23 2313   • ranolazine (RANEXA) 12 hr tablet 500 mg  500 mg Oral Q12H [address] Ort[address] MD   500 mg at 05/12/23 0810   • sodium chloride 0 9 % infusion  75 mL/hr Intravenous Continuous [address] MD Diana 75 mL/hr at 05/11/23 2221 75 mL/hr at 05/11/23 2221   • tamsulosin (FLOMAX) capsule 0 4 mg  0 4 mg Oral Daily With [address] [facility], MD   0 4 mg at 05/11/23 2313   • thiamine tablet 100 mg  100 mg Oral Daily [address] MD Diana   100 mg at 05/12/23 0808       Invasive Devices     Peripheral Intravenous Line  Duration           Peripheral IV 05/11/23 Distal;Dorsal (posterior); Right Forearm <1 day                Lab, Imaging and other studies: I have personally reviewed pertinent reports      VTE Pharmacologic Prophylaxis: Enoxaparin (Lovenox)  VTE Mechanical Prophylaxis: sequential compression device    Jez Kelley MD

## 2023-05-12 NOTE — H&P
History and Physical - [facility]     Patient Information: Mony Jasso 66 y o  male MRN: [id number]  Unit/Bed#: LENA Encounter: [id number]  Admitting Physician: Eliseo Giles MD   PCP: Yvonne Lei MD  Date of Admission:  05/11/23     Assessment and Plan     * Alcohol intoxication [facility])  Assessment & Plan  Presented with etoh 367 on admission after consuming half a bottle of vodka prior to admission  History of chronic alcohol abuse drinks a quart of vodka daily  Mild tremor noted on exam   - CIWA protocol  - Fall precautions  - Monitor for withdrawal  - Continue Thiamine, Folate, ferrous sulfate    Hyponatremia  Assessment & Plan  Acute   Na 127 on admission likely hypovolemic secondary to poor PO intake  - IVF NS at 75 ml/hr  - Trend BMP    Stage 3a chronic kidney disease [facility])  Assessment & Plan  Lab Results   Component Value Date    EGFR 69 05/11/2023    EGFR 77 01/17/2023    EGFR 77 11/25/2022    CREATININE 1 13 05/11/2023    CREATININE 1 04 01/17/2023    CREATININE 1 04 11/25/2022     Chronic, stable  - Avoid nephrotoxins  - I/O's    Chronic heart failure with preserved ejection fraction [facility])  Assessment & Plan  Wt Readings from Last 3 Encounters:   02/10/23 94 8 kg (209 lb)   01/27/23 94 6 kg (208 lb 8 oz)   01/17/23 92 1 kg (203 lb)     Chronic, stable   Does not appear fluid overloaded on exam  Stress test 11/2022 showed EF 44%  - Continue home meds  - Daily Wts, I/O's    Nicotine abuse  Assessment & Plan  Chronic, smokes 1 PPD  - Nicotine patch ordered    CAD (coronary artery disease)  Assessment & Plan  Chronic s/p CABG  - Continue home Coreg 12 5 mg BID, Ranexa 500 mg BID    Dyslipidemia  Assessment & Plan  Chronic  - Continue Pravastatin 40 mg daily    Essential hypertension  Assessment & Plan  Chronic, stable  - Continue home Coreg 12 5 mg BID and Lisinopril 20 mg daily    Type 2 diabetes mellitus ([facility] [address] )  Assessment & Plan  Lab Results   Component Value Date    HGBA1C 5 4 01/27/2023       No results for input(s): POCGLU in the last 72 hours  Blood Sugar Average: Last 72 hrs:     - Level 2 Carb controlled diet  - Continue Metformin 500 mg daily    COPD (chronic obstructive pulmonary disease) ([facility])  Assessment & Plan  Chronic  Home inhalers include albuterol and Breo Ellipta  Examination showed diffuse rhonchi bilaterally  - Continue Breo Ellipta inhaler and albuterol PRN  - Monitor O2 sats       Fluids: IVF NS @ 75 cc/hr  Electrolyte repletion: Replete as needed  Nutrition:        Diet Orders   (From admission, onward)             Start     Ordered    05/11/23 2200  Diet Vlad/CHO Controlled; Consistent Carbohydrate Diet Level 2 (5 carb servings/75 grams CHO/meal)  Diet effective now        References:    Nutrtion Support Algorithm Enteral vs  Parenteral   Question Answer Comment   Diet Type Vlad/CHO Controlled    Vlad/CHO Controlled Consistent Carbohydrate Diet Level 2 (5 carb servings/75 grams CHO/meal)    RD to adjust diet per protocol? Yes        05/11/23 2159               VTE Prophylaxis: VTE Score: 4 Moderate Risk (Score 3-4) - Pharmacological DVT Prophylaxis Ordered: enoxaparin (Lovenox)  Code Status: Level 1 - Full Code  Anticipated Length of Stay:  Patient will be admitted on an Observation basis with an anticipated length of stay of  less than 2 midnights  Justification for Hospital Stay: Alcohol intoxication  Total Time for Visit, including Counseling / Coordination of Care: 30 mins  Greater than 50% of this total time spent on direct patient counseling and coordination of care  Patient Information Sharing: With the consent of Lv Rosenberg, their loved ones will be notified by inpatient team of the patient’s condition and current plan  All questions answered      Chief Complaint:     Chief Complaint   Patient presents with   • Alcohol Intoxication     Pt states that he drank half a bottle of vodka        Subjective      History of Present Illness:     Jb Ibarra is a 66 y o  male with a PMHx that includes chronic alcohol use, hypertension, hyperlipidemia, COPD, CAD who presents for alcohol intoxication  Patient states his neighbors called EMS, and reports he drank half a bottle of vodka  Patient reports he drinks a quart of vodka daily  He states he remembers falling, but is unsure if he lost consciousness  Denies presyncopal symptoms, chest pain, shortness of breath, nausea, vomiting, diarrhea  Patient reports he does not wish to stay in the hospital as he is looking to buy a tractor for his grandson, and states that he does not have the money by Saturday, the seller will put it up for auction  States he did not  Denies any other complaints at this time  Review of Systems:  Review of Systems   Unable to perform ROS: Other (Intoxicated)   Constitutional: Negative for appetite change and fever  HENT: Negative for sore throat  Eyes: Negative for visual disturbance  Respiratory: Negative for shortness of breath  Cardiovascular: Negative for chest pain  Gastrointestinal: Negative for abdominal pain, diarrhea, nausea and vomiting  Musculoskeletal: Negative for back pain  Skin: Negative for rash  Neurological: Negative for dizziness and headaches          Past Medical History:   Diagnosis Date   • Acute on chronic kidney failure (HCC)    • [facility] 6/7/2019   • Cancer [facility])     prostate ca,had radiation   • Cardiac disease     stents,then triple bypass   • COPD (chronic obstructive pulmonary disease) ([facility] [address] )    • ETOH abuse    • Hx of heart artery stent     2014   • Hyperlipidemia    • Hypertension    • Hypovolemic shock ([facility] [address] ) 12/22/2019   • Lumbar spondylitis ([facility] [address] ) 10/13/2022   • Nasal bone fracture 10/10/2022   • Prostate CA ([facility] [address] )    • S/P CABG x 1     2004     Past Surgical History:   Procedure Laterality Date   • CORONARY ARTERY BYPASS GRAFT  2004   • MS ARTHRD ANT INTERBODY MIN DSC CRV BELOW C2 N/A 12/16/2020    Procedure: Anterior cervical discectomy with fusion C4-C7; Posterior cervical decompression and fusion C2-T2;  Surgeon: Sven Morocho MD;  Location: [facility];  Service: Neurosurgery   • TONSILLECTOMY       No Known Allergies  Prior to Admission Medications   Prescriptions Last Dose Informant Patient Reported? Taking? Blood Glucose Monitoring Suppl (ONE TOUCH ULTRA MINI) w/Device KIT   Yes No   Sig: Use as directed   Breo Ellipta 200-25 MCG/ACT inhaler   No No   Sig: INHALE 1 PUFF DAILY RINSE MOUTH AFTER USE  ONETOUCH DELICA LANCETS FINE MISC   Yes No   Sig: 3 (three) times a day Test   Thiamine HCl (vitamin B-1) 100 MG TABS   No No   Sig: TAKE 1 TABLET DAILY   albuterol (2 5 mg/3 mL) 0 083 % nebulizer solution   No No   Sig: Take 3 mL (2 5 mg total) by nebulization every 6 (six) hours as needed for wheezing or shortness of breath   albuterol (Ventolin HFA) 90 mcg/act inhaler   No No   Sig: Inhale 2 puffs every 4 (four) hours as needed for wheezing   carvedilol (COREG) 12 5 mg tablet   No No   Sig: Take 1 tablet (12 5 mg total) by mouth 2 (two) times a day with meals   ferrous sulfate 325 (65 Fe) mg tablet   No No   Sig: Take 1 tablet (325 mg total) by mouth 2 (two) times a day with meals   folic acid (FOLVITE) 1 mg tablet   No No   Sig: TAKE 1 TABLET DAILY   gabapentin (NEURONTIN) 300 mg capsule   No No   Sig: TAKE ONE CAPSULE THREE TIMES DAILY   lisinopril (ZESTRIL) 20 mg tablet   No No   Sig: Take 1 tablet (20 mg total) by mouth daily   magnesium Oxide (MAG-OX) 400 mg TABS   No No   Sig: TAKE 1 TABLET TWO TIMES A DAY   metFORMIN (GLUCOPHAGE) 500 mg tablet   No No   Sig: TAKE 1 TABLET DAILY WITH BREAKFAST   pantoprazole (PROTONIX) 40 mg tablet   No No   Sig: Take 1 tablet (40 mg total) by mouth daily in the early morning Do not start before November 26, 2022     Patient taking differently: Take 40 mg by mouth 2 (two) times a day as needed   pravastatin (PRAVACHOL) 40 mg tablet   No No   Sig: TAKE 1 TABLET DAILY WITH DINNER   prednisoLONE acetate (PRED FORTE) 1 % ophthalmic suspension   Yes No   ranolazine (RANEXA) 500 mg 12 hr tablet   No No   Sig: TAKE 1 TABLET EVERY 12 HOURS   tamsulosin (FLOMAX) 0 4 mg   No No   Sig: TAKE 1 CAPSULE DAILY      Facility-Administered Medications: None     Social History     Socioeconomic History   • Marital status: Single     Spouse name: Not on file   • Number of children: Not on file   • Years of education: Not on file   • Highest education level: Not on file   Occupational History   • Occupation: contractor, 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)   Tobacco Use   • Smoking status: Every Day     Packs/day: 1 00     Years: 40 00     Pack years: 40 00     Types: Cigarettes   • Smokeless tobacco: Never   Vaping Use   • Vaping Use: Never used   Substance and Sexual Activity   • Alcohol use: Yes     Alcohol/week: 4 0 standard drinks     Types: 4 Standard drinks or equivalent per week     Comment: daily   • Drug use: No   • Sexual activity: Not Currently   Other Topics Concern   • Not on file   Social History Narrative   • Not on file     Social Determinants of Health     Financial Resource Strain: Low Risk    • Difficulty of Paying Living Expenses: Not very hard   Food Insecurity: No Food Insecurity   • Worried About Running Out of Food in the Last Year: Never true   • Ran Out of Food in the Last Year: Never true   Transportation Needs: No Transportation Needs   • Lack of Transportation (Medical): No   • Lack of Transportation (Non-Medical): No   Physical Activity: Not on file   Stress: Stress Concern Present   • Feeling of Stress :  To some extent   Social Connections: Not on file   Intimate Partner Violence: Not on file   Housing Stability: High Risk   • Unable to Pay for Housing in the Last Year: No   • Number of Places Lived in the Last Year: 1   • Unstable Housing in the Last Year: Yes     Family History   Problem Relation Age of Onset   • Diabetes Mother    • Uterine "cancer Mother    • COPD Father    • Hypertension Father         Patient Pre-hospital Living Situation: 6 floor apartment, lives alone  Patient Pre-hospital Level of Mobility: Ambulatory  Patient Pre-hospital Diet Restrictions: N/A          Objective     Physical Exam:   Vitals:   Blood Pressure: 115/62 (05/11/23 1952)  Pulse: 86 (05/11/23 1952)  Temperature: 98 3 °F (36 8 °C) (05/11/23 1952)  Temp Source: Oral (05/11/23 1952)  Respirations: 18 (05/11/23 1952)  Height: 6' 2\" (188 cm) (05/11/23 1952)  SpO2: 96 % (05/11/23 1952)     Physical Exam  Vitals reviewed  Constitutional:       General: He is not in acute distress  Appearance: Normal appearance  HENT:      Head: Normocephalic  Mouth/Throat:      Mouth: Mucous membranes are moist    Eyes:      Extraocular Movements: Extraocular movements intact  Conjunctiva/sclera: Conjunctivae normal    Cardiovascular:      Rate and Rhythm: Normal rate and regular rhythm  Pulses: Normal pulses  Heart sounds: Normal heart sounds  No murmur heard  Pulmonary:      Effort: No respiratory distress  Breath sounds: Rhonchi present  No wheezing  Comments: Diffuse rhonchi bilaterally  Abdominal:      Palpations: Abdomen is soft  Tenderness: There is no abdominal tenderness  There is no guarding  Musculoskeletal:         General: No swelling or tenderness  Comments: Ecchymosis on right forearm  Right elbow dressing C/D/I   Skin:     General: Skin is warm and dry  Neurological:      Mental Status: He is alert and oriented to person, place, and time  Motor: No weakness        Comments: Mild tremor   Psychiatric:         Mood and Affect: Mood normal            Lab Results:   Results from last 7 days   Lab Units 05/11/23 2048   WBC Thousand/uL 7 05   HEMOGLOBIN g/dL 13 1   HEMATOCRIT % 36 9   PLATELETS Thousands/uL 150   NEUTROS PCT % 67   LYMPHS PCT % 15   MONOS PCT % 16*   EOS PCT % 1     Results from last 7 days   Lab Units " 05/11/23  2048   POTASSIUM mmol/L 4 5   CHLORIDE mmol/L 92*   CO2 mmol/L 24   BUN mg/dL 25   CREATININE mg/dL 1 13   CALCIUM mg/dL 8 4   ALK PHOS U/L 59   ALT U/L 22   AST U/L 31   EGFR ml/min/1 73sq m 69                            Invalid input(s): URIBILINOGEN                    Imaging: I have personally reviewed pertinent reports  CT head without contrast    Result Date: 5/11/2023  No acute intracranial abnormality  Workstation performed: [id number]     CT cervical spine without contrast    Result Date: 5/11/2023  No cervical spine fracture or traumatic malalignment   Workstation performed: [id number]         EKG, Pathology, and Other Studies Reviewed on Admission:          Entire H&P was discussed with Dr Karen Pierce who agreed to what is noted above     ** Please Note: This note has been constructed using a voice recognition system **     Macie Boogie MD  05/11/23  10:29 PM

## 2023-05-12 NOTE — PLAN OF CARE
Problem: PAIN - ADULT  Goal: Verbalizes/displays adequate comfort level or baseline comfort level  Description: Interventions:  - Encourage patient to monitor pain and request assistance  - Assess pain using appropriate pain scale  - Administer analgesics based on type and severity of pain and evaluate response  - Implement non-pharmacological measures as appropriate and evaluate response  - Consider cultural and social influences on pain and pain management  - Notify physician/advanced practitioner if interventions unsuccessful or patient reports new pain  Outcome: Progressing     Problem: INFECTION - ADULT  Goal: Absence or prevention of progression during hospitalization  Description: INTERVENTIONS:  - Assess and monitor for signs and symptoms of infection  - Monitor lab/diagnostic results  - Monitor all insertion sites, i e  indwelling lines, tubes, and drains  - Instruct and encourage patient and family to use good hand hygiene technique  - Identify and instruct in appropriate isolation precautions for identified infection/condition  Outcome: Completed  Goal: Absence of fever/infection during neutropenic period  Description: INTERVENTIONS:  - Monitor WBC    Outcome: Completed     Problem: SAFETY ADULT  Goal: Patient will remain free of falls  Description: INTERVENTIONS:  - Educate patient/family on patient safety including physical limitations  - Instruct patient to call for assistance with activity   - Consult OT/PT to assist with strengthening/mobility   - Keep Call bell within reach  - Keep bed low and locked with side rails adjusted as appropriate  - Keep care items and personal belongings within reach  - Initiate and maintain comfort rounds  - Make Fall Risk Sign visible to staff  - Offer Toileting every 2 Hours, in advance of need  - Initiate/Maintain bed alarm  - Obtain necessary fall risk management equipment: N/A  - Apply yellow socks and bracelet for high fall risk patients  - Consider moving patient to room near nurses station  Outcome: Progressing  Goal: Maintain or return to baseline ADL function  Description: INTERVENTIONS:  -  Assess patient's ability to carry out ADLs; assess patient's baseline for ADL function and identify physical deficits which impact ability to perform ADLs (bathing, care of mouth/teeth, toileting, grooming, dressing, etc )  - Assess/evaluate cause of self-care deficits   - Assess range of motion  - Assess patient's mobility; develop plan if impaired  - Assess patient's need for assistive devices and provide as appropriate  - Encourage maximum independence but intervene and supervise when necessary  - Involve family in performance of ADLs  - Assess for home care needs following discharge   - Consider OT consult to assist with ADL evaluation and planning for discharge  - Provide patient education as appropriate  Outcome: Progressing  Goal: Maintains/Returns to pre admission functional level  Description: INTERVENTIONS:  - Perform BMAT or MOVE assessment daily    - Set and communicate daily mobility goal to care team and patient/family/caregiver  - Collaborate with rehabilitation services on mobility goals if consulted  - Perform Range of Motion 3 times a day  - Reposition patient every 2 hours    - Dangle patient 3 times a day  - Stand patient 3 times a day  - Ambulate patient 3 times a day  - Out of bed to chair 3 times a day   - Out of bed for meals 3 times a day  - Out of bed for toileting  - Record patient progress and toleration of activity level   Outcome: Progressing     Problem: DISCHARGE PLANNING  Goal: Discharge to home or other facility with appropriate resources  Description: INTERVENTIONS:  - Identify barriers to discharge w/patient and caregiver  - Arrange for needed discharge resources and transportation as appropriate  - Identify discharge learning needs (meds, wound care, etc )  - Refer to Case Management Department for coordinating discharge planning if the patient needs post-hospital services based on physician/advanced practitioner order or complex needs related to functional status, cognitive ability, or social support system  Outcome: Progressing     Problem: Knowledge Deficit  Goal: Patient/family/caregiver demonstrates understanding of disease process, treatment plan, medications, and discharge instructions  Description: Complete learning assessment and assess knowledge base    Interventions:  - Provide teaching at level of understanding  - Provide teaching via preferred learning methods  Outcome: Progressing

## 2023-05-12 NOTE — ASSESSMENT & PLAN NOTE
Lab Results   Component Value Date    EGFR 70 05/13/2023    EGFR 60 05/12/2023    EGFR 69 05/11/2023    CREATININE 1 12 05/13/2023    CREATININE 1 27 05/12/2023    CREATININE 1 13 05/11/2023     Chronic, stable  - Avoid nephrotoxins  - I/O's

## 2023-05-12 NOTE — ASSESSMENT & PLAN NOTE
Chronic  Home inhalers include albuterol and Breo Ellipta   Examination showed diffuse rhonchi bilaterally on admission which has improved   - Continue Breo Ellipta inhaler and albuterol PRN  - Monitor O2 sats

## 2023-05-13 VITALS
SYSTOLIC BLOOD PRESSURE: 143 MMHG | OXYGEN SATURATION: 92 % | DIASTOLIC BLOOD PRESSURE: 79 MMHG | HEART RATE: 116 BPM | HEIGHT: 74 IN | WEIGHT: 202.16 LBS | RESPIRATION RATE: 18 BRPM | BODY MASS INDEX: 25.94 KG/M2 | TEMPERATURE: 97.7 F

## 2023-05-13 LAB
ALBUMIN SERPL BCP-MCNC: 3.9 G/DL (ref 3.5–5)
ALP SERPL-CCNC: 49 U/L (ref 34–104)
ALT SERPL W P-5'-P-CCNC: 18 U/L (ref 7–52)
ANION GAP SERPL CALCULATED.3IONS-SCNC: 12 MMOL/L (ref 4–13)
AST SERPL W P-5'-P-CCNC: 25 U/L (ref 13–39)
BASOPHILS # BLD AUTO: 0.08 THOUSANDS/ÂΜL (ref 0–0.1)
BASOPHILS NFR BLD AUTO: 1 % (ref 0–1)
BILIRUB SERPL-MCNC: 0.64 MG/DL (ref 0.2–1)
BUN SERPL-MCNC: 20 MG/DL (ref 5–25)
CALCIUM SERPL-MCNC: 9.2 MG/DL (ref 8.4–10.2)
CHLORIDE SERPL-SCNC: 94 MMOL/L (ref 96–108)
CO2 SERPL-SCNC: 24 MMOL/L (ref 21–32)
CREAT SERPL-MCNC: 1.12 MG/DL (ref 0.6–1.3)
EOSINOPHIL # BLD AUTO: 0.04 THOUSAND/ÂΜL (ref 0–0.61)
EOSINOPHIL NFR BLD AUTO: 1 % (ref 0–6)
ERYTHROCYTE [DISTWIDTH] IN BLOOD BY AUTOMATED COUNT: 15.3 % (ref 11.6–15.1)
GFR SERPL CREATININE-BSD FRML MDRD: 70 ML/MIN/1.73SQ M
GLUCOSE P FAST SERPL-MCNC: 163 MG/DL (ref 65–99)
GLUCOSE SERPL-MCNC: 163 MG/DL (ref 65–140)
HCT VFR BLD AUTO: 36.7 % (ref 36.5–49.3)
HGB BLD-MCNC: 12.8 G/DL (ref 12–17)
IMM GRANULOCYTES # BLD AUTO: 0.03 THOUSAND/UL (ref 0–0.2)
IMM GRANULOCYTES NFR BLD AUTO: 0 % (ref 0–2)
LYMPHOCYTES # BLD AUTO: 1.01 THOUSANDS/ÂΜL (ref 0.6–4.47)
LYMPHOCYTES NFR BLD AUTO: 14 % (ref 14–44)
MCH RBC QN AUTO: 34.6 PG (ref 26.8–34.3)
MCHC RBC AUTO-ENTMCNC: 34.9 G/DL (ref 31.4–37.4)
MCV RBC AUTO: 99 FL (ref 82–98)
MONOCYTES # BLD AUTO: 1.37 THOUSAND/ÂΜL (ref 0.17–1.22)
MONOCYTES NFR BLD AUTO: 19 % (ref 4–12)
NEUTROPHILS # BLD AUTO: 4.81 THOUSANDS/ÂΜL (ref 1.85–7.62)
NEUTS SEG NFR BLD AUTO: 65 % (ref 43–75)
NRBC BLD AUTO-RTO: 0 /100 WBCS
PLATELET # BLD AUTO: 121 THOUSANDS/UL (ref 149–390)
PMV BLD AUTO: 9.9 FL (ref 8.9–12.7)
POTASSIUM SERPL-SCNC: 3.9 MMOL/L (ref 3.5–5.3)
PROT SERPL-MCNC: 7.2 G/DL (ref 6.4–8.4)
RBC # BLD AUTO: 3.7 MILLION/UL (ref 3.88–5.62)
SODIUM SERPL-SCNC: 130 MMOL/L (ref 135–147)
WBC # BLD AUTO: 7.34 THOUSAND/UL (ref 4.31–10.16)

## 2023-05-13 RX ORDER — NALTREXONE HYDROCHLORIDE 50 MG/1
50 TABLET, FILM COATED ORAL DAILY
Qty: 30 TABLET | Refills: 2 | Status: SHIPPED | OUTPATIENT
Start: 2023-05-14

## 2023-05-13 RX ORDER — LORAZEPAM 1 MG/1
2 TABLET ORAL ONCE
Status: COMPLETED | OUTPATIENT
Start: 2023-05-13 | End: 2023-05-13

## 2023-05-13 RX ORDER — AMLODIPINE BESYLATE 5 MG/1
5 TABLET ORAL DAILY
Qty: 30 TABLET | Refills: 1 | Status: SHIPPED | OUTPATIENT
Start: 2023-05-13

## 2023-05-13 RX ADMIN — PANTOPRAZOLE SODIUM 40 MG: 40 TABLET, DELAYED RELEASE ORAL at 05:34

## 2023-05-13 RX ADMIN — ENOXAPARIN SODIUM 40 MG: 40 INJECTION SUBCUTANEOUS at 08:53

## 2023-05-13 RX ADMIN — Medication 1 TABLET: at 08:52

## 2023-05-13 RX ADMIN — OXYCODONE HYDROCHLORIDE AND ACETAMINOPHEN 500 MG: 500 TABLET ORAL at 08:56

## 2023-05-13 RX ADMIN — FERROUS SULFATE TAB 325 MG (65 MG ELEMENTAL FE) 325 MG: 325 (65 FE) TAB at 08:52

## 2023-05-13 RX ADMIN — METFORMIN HYDROCHLORIDE 500 MG: 500 TABLET ORAL at 08:52

## 2023-05-13 RX ADMIN — GABAPENTIN 100 MG: 100 CAPSULE ORAL at 08:52

## 2023-05-13 RX ADMIN — FLUTICASONE FUROATE AND VILANTEROL TRIFENATATE 1 PUFF: 200; 25 POWDER RESPIRATORY (INHALATION) at 08:54

## 2023-05-13 RX ADMIN — NALTREXONE HYDROCHLORIDE 50 MG: 50 TABLET, FILM COATED ORAL at 08:54

## 2023-05-13 RX ADMIN — FOLIC ACID 1 MG: 1 TABLET ORAL at 08:52

## 2023-05-13 RX ADMIN — LISINOPRIL 40 MG: 20 TABLET ORAL at 08:52

## 2023-05-13 RX ADMIN — CARVEDILOL 12.5 MG: 12.5 TABLET, FILM COATED ORAL at 08:52

## 2023-05-13 RX ADMIN — THIAMINE HCL TAB 100 MG 100 MG: 100 TAB at 08:52

## 2023-05-13 RX ADMIN — RANOLAZINE 500 MG: 500 TABLET, EXTENDED RELEASE ORAL at 08:53

## 2023-05-13 RX ADMIN — LORAZEPAM 2 MG: 1 TABLET ORAL at 04:15

## 2023-05-13 NOTE — UTILIZATION REVIEW
"Continued Stay Review    OBS 05-11-23 @ 2141 CONVERTED TO INPATIENT 05-13-23 @ 0905 FOR CONTINUATION OF CARE FOR ALCOHOL INTOXICATION  Inpatient Admission  Once        Transfer Service: Family Medicine       Question Answer Comment   Level of Care Med Surg    Estimated length of stay More than 2 Midnights    Certification I certify that inpatient services are medically necessary for this patient for a duration of greater than two midnights  See H&P and MD Progress Notes for additional information about the patient's course of treatment  05/13/23 0905               Orders Placed This Encounter   Procedures   • Place in Observation     Standing Status:   Standing     Number of Occurrences:   1     Order Specific Question:   Level of Care     Answer:   Med Surg [16]   • Inpatient Admission     Standing Status:   Standing     Number of Occurrences:   1     Order Specific Question:   Level of Care     Answer:   Med Surg [16]     Order Specific Question:   Estimated length of stay     Answer:   More than 2 Midnights     Order Specific Question:   Certification     Answer:   I certify that inpatient services are medically necessary for this patient for a duration of greater than two midnights  See H&P and MD Progress Notes for additional information about the patient's course of treatment  Date:05-13-23                           Current Patient Class: inpatient  Current Level of Care: medical     HPI:63 y o  male initially admitted on *05-13-23      Assessment/Plan: CIWA protocol with Ativan Continue Thiamine, Folate, ferrous sulfate CIWA 15-  5 -9 -5 Mild tremor noted on exam Na 127 on admission likely hypovolemic secondary to poor PO intake  Increased to 133 this morning  Ecchymosis on right forearm  Right elbow dressing C/D/I  IVF NS at 75 ml/hr Patient was seen and examined at bedside   He states he wants \"to get out of this place\" and is asking when he would receive his breakfast  However, he was " pleasant during exam  He denies any tremors, dizziness, headaches, abdominal pain, sob, chest pain, palpitations at this time         Vital Signs:   Date/Time Temp Pulse Resp BP MAP (mmHg) SpO2 Calculated FIO2 (%) - Nasal Cannula Nasal Cannula O2 Flow Rate (L/min) O2 Device Patient Position - Orthostatic VS   05/13/23 09:01:18 97 7 °F (36 5 °C) 116 Abnormal  18 143/79 100 92 % -- -- None (Room air) Lying   05/13/23 0719 -- 87 -- -- -- 90 % -- -- -- --   05/13/23 05:11:44 -- 85 -- 141/83 102 91 % -- -- -- --   05/13/23 03:59:24 -- 93 -- 150/81 104 93 % -- -- -- --   05/13/23 01:45:19 -- 89 -- 125/74 91 90 % -- -- -- --   05/13/23 0000 -- -- -- -- -- 93 % 28 2 L/min Nasal cannula --   05/12/23 23:51:27 98 8 °F (37 1 °C) 93 18 138/76 97 89 % Abnormal   -- -- -- --   SpO2: O2 placed at 05/12/23 2351   05/12/23 22:39:39 100 7 °F (38 2 °C) Abnormal  97 19 176/90 Abnormal  119 90 % -- -- -- --   05/12/23 22:23:29 99 3 °F (37 4 °C) 96 17 173/86 Abnormal  115 93 % -- -- -- --   05/12/23 2100 -- 93 -- 191/101 Abnormal   131 94 % -- -- -- --   BP: Amlodipine given at 05/12/23 2100 05/12/23 19:10:28 99 5 °F (37 5 °C) 94 18 157/77 104 90 % -- -- -- --   05/12/23 1806 -- -- -- 176/90 Abnormal  -- -- -- -- -- --   05/12/23 1650 -- -- -- 180/84 Abnormal  -- -- -- -- -- --   05/12/23 1530 -- -- -- 186/96 Abnormal  -- -- -- -- -- --   05/12/23 1500 99 7 °F (37 6 °C) 105 -- 186/96 Abnormal  -- 91 % -- -- None (Room air)      Pertinent Labs/Diagnostic Results:       Results from last 7 days   Lab Units 05/13/23 0421 05/12/23 0442 05/11/23 2048   WBC Thousand/uL 7 34 4 62 7 05   HEMOGLOBIN g/dL 12 8 12 6 13 1   HEMATOCRIT % 36 7 36 5 36 9   PLATELETS Thousands/uL 121* 129* 150   NEUTROS ABS Thousands/µL 4 81 2 48 4 68         Results from last 7 days   Lab Units 05/13/23 0421 05/12/23 0442 05/11/23 2048   SODIUM mmol/L 130* 133* 127*   POTASSIUM mmol/L 3 9 4 5 4 5   CHLORIDE mmol/L 94* 99 92*   CO2 mmol/L 24 25 24   ANION GAP mmol/L 12 9 11   BUN mg/dL 20 26* 25   CREATININE mg/dL 1 12 1 27 1 13   EGFR ml/min/1 73sq m 70 60 69   CALCIUM mg/dL 9 2 8 4 8 4     Results from last 7 days   Lab Units 05/13/23  0421 05/12/23 0442 05/11/23 2048   AST U/L 25 28 31   ALT U/L 18 20 22   ALK PHOS U/L 49 56 59   TOTAL PROTEIN g/dL 7 2 6 6 7 2   ALBUMIN g/dL 3 9 3 6 4 0   TOTAL BILIRUBIN mg/dL 0 64 0 31 0 36         Results from last 7 days   Lab Units 05/13/23  0421 05/12/23  0442 05/11/23 2048   GLUCOSE RANDOM mg/dL 163* 139 115       Results from last 7 days   Lab Units 05/12/23  1544   AMPH/METH  Negative   BARBITURATE UR  Negative   BENZODIAZEPINE UR  Negative   COCAINE UR  Negative   METHADONE URINE  Negative   OPIATE UR  Negative   PCP UR  Negative   THC UR  Negative     Results from last 7 days   Lab Units 05/11/23 2048   ETHANOL LVL mg/dL 367*                                 Medications:   Scheduled Medications:  amLODIPine, 10 mg, Oral, HS  ascorbic acid, 500 mg, Oral, Daily  carvedilol, 12 5 mg, Oral, BID With Meals  enoxaparin, 40 mg, Subcutaneous, Daily  ferrous sulfate, 325 mg, Oral, Daily With Breakfast  fluticasone-vilanterol, 1 puff, Inhalation, Daily  folic acid, 1 mg, Oral, Daily  gabapentin, 100 mg, Oral, TID  lisinopril, 40 mg, Oral, Daily  metFORMIN, 500 mg, Oral, Daily With Breakfast  multivitamin-minerals, 1 tablet, Oral, Daily  naltrexone, 50 mg, Oral, Daily  pantoprazole, 40 mg, Oral, Early Morning  pravastatin, 40 mg, Oral, Daily With Dinner  ranolazine, 500 mg, Oral, Q12H  tamsulosin, 0 4 mg, Oral, Daily With Dinner  thiamine, 100 mg, Oral, Daily      Continuous IV Infusions:     PRN Meds:  acetaminophen, 650 mg, Oral, Q6H PRN  albuterol, 2 puff, Inhalation, Q4H PRN        Discharge Plan: TBD    Network Utilization Review Department  ATTENTION: Please call with any questions or concerns to [phone] and carefully listen to the prompts so that you are directed to the right person   All voicemails are confidential   Alissa Vazquez all requests for admission clinical reviews, approved or denied determinations and any other requests to dedicated fax number below belonging to the campus where the patient is receiving treatment   List of dedicated fax numbers for the Facilities:  [address] DENIALS (Administrative/Medical Necessity) [phone]   [address] (Maternity/NICU/Pediatrics) [phone]   [address] [phone]   [facility] [address] [phone]   [address] 28 [phone]   1552 [facility] 134 815 [facility] [phone]

## 2023-05-13 NOTE — PROGRESS NOTES
Daily Progress Note - [address] [facility]  Family Medicine Residency  Eugenia Gusman 66 y o  male MRN: [id number]  Unit/Bed#: [address] [place] [address] Encounter: [id number]  Admitting Physician: Agatha Castro MD   PCP: Neyda Kamara MD  Date of Admission:  5/11/2023  7:49 PM    Assessment and Plan    * Alcohol intoxication ([address] )  Assessment & Plan  Presented with etoh 367 on admission after consuming half a bottle of vodka prior to admission  History of chronic alcohol abuse drinks a quart of vodka daily  Mild tremor noted on exam    - CIWA protocol with Ativan   - Fall precautions  - Monitor for withdrawal  - Continue Thiamine, Folate, ferrous sulfate    Stage 3a chronic kidney disease [facility])  Assessment & Plan  Lab Results   Component Value Date    EGFR 70 05/13/2023    EGFR 60 05/12/2023    EGFR 69 05/11/2023    CREATININE 1 12 05/13/2023    CREATININE 1 27 05/12/2023    CREATININE 1 13 05/11/2023     Chronic, stable  - Avoid nephrotoxins  - I/O's    Chronic heart failure with preserved ejection fraction [facility])  Assessment & Plan  Wt Readings from Last 3 Encounters:   05/12/23 91 7 kg (202 lb 2 6 oz)   02/10/23 94 8 kg (209 lb)   01/27/23 94 6 kg (208 lb 8 oz)     Chronic, stable  Does not appear fluid overloaded on exam  Stress test 11/2022 showed EF 44%  - Continue home meds  - Daily Wts, I/O's    Hyponatremia  Assessment & Plan  Acute   Na 127 on admission likely hypovolemic secondary to poor PO intake  Increased to 133 this morning    - IVF NS at 75 ml/hr  - Trend BMP    Nicotine abuse  Assessment & Plan  Chronic, smokes 1 PPD  - Nicotine patch ordered    CAD (coronary artery disease)  Assessment & Plan  Chronic s/p CABG  - Continue home Coreg 12 5 mg BID, Ranexa 500 mg BID    Dyslipidemia  Assessment & Plan  Chronic  - Continue Pravastatin 40 mg daily    Essential hypertension  Assessment & Plan  Chronic, stable  Home Coreg 12 5 mg BID and Lisinopril 20 mg daily   Pt experienced episode of hypertensive urgency "morning of  which slowly decreased after home medications were administered in admission to 20 mg Lisinopril    - Continue home Coreg 12 5 mg BID   - Increased Lisinopril to 40 mg daily  - Added amlodipine 10 mg daily   - Continue to monitor Blood pressure    Type 2 diabetes mellitus ([facility] [address] )  Assessment & Plan  Lab Results   Component Value Date    HGBA1C 5 4 2023       No results for input(s): POCGLU in the last 72 hours  Blood Sugar Average: Last 72 hrs:     - Level 2 Carb controlled diet  - Continue Metformin 500 mg daily    COPD (chronic obstructive pulmonary disease) ([facility])  Assessment & Plan  Chronic  Home inhalers include albuterol and Breo Ellipta  Examination showed diffuse rhonchi bilaterally on admission which has improved   - Continue Breo Ellipta inhaler and albuterol PRN  - Monitor O2 sats      VTE Pharmacologic Prophylaxis: VTE Score: 4 Moderate Risk (Score 3-4) - Pharmacological DVT Prophylaxis Ordered: enoxaparin (Lovenox)  Patient Centered Rounds: I have performed bedside rounds with nursing staff today  Time Spent for Care: 20 minutes  More than 50% of total time spent on counseling and coordination of care as described above  Current Length of Stay: 0 day(s)    Current Patient Status: Observation   Certification Statement: The patient will continue to require additional inpatient hospital stay due to alcohol intoxication/withdrawal    Code Status: Level 1 - Full Code    Subjective:   Patient was seen and examined at bedside  He states he wants \"to get out of this place\" and is asking when he would receive his breakfast  However, he was pleasant during exam  He denies any tremors, dizziness, headaches, abdominal pain, sob, chest pain, palpitations at this time       Objective     Objective:   Vitals:   Temp (24hrs), Av 6 °F (37 6 °C), Min:98 8 °F (37 1 °C), Max:100 7 °F (38 2 °C)    Temp:  [98 8 °F (37 1 °C)-100 7 °F (38 2 °C)] 98 8 °F (37 1 °C)  HR:  [] 87  Resp:  " [17-19] 18  BP: (125-197)/() 141/83  SpO2:  [89 %-94 %] 90 %  Body mass index is 25 96 kg/m²  Input and Output Summary (last 24 hours): Intake/Output Summary (Last 24 hours) at 5/13/2023 0858  Last data filed at 5/12/2023 1939  Gross per 24 hour   Intake 1530 ml   Output 2275 ml   Net -745 ml       Physical Exam:   Physical Exam  Vitals reviewed  Constitutional:       General: He is not in acute distress  Appearance: Normal appearance  HENT:      Head: Normocephalic  Eyes:      General: No scleral icterus  Extraocular Movements: Extraocular movements intact  Conjunctiva/sclera: Conjunctivae normal    Cardiovascular:      Rate and Rhythm: Normal rate and regular rhythm  Heart sounds: Normal heart sounds  No murmur heard  Pulmonary:      Effort: No respiratory distress  Breath sounds: No wheezing  Abdominal:      General: Bowel sounds are normal       Palpations: Abdomen is soft  Tenderness: There is no abdominal tenderness  There is no guarding  Musculoskeletal:         General: No swelling or tenderness  Comments: Ecchymosis on right forearm  Right elbow dressing C/D/I   Skin:     General: Skin is warm  Neurological:      Mental Status: He is alert and oriented to person, place, and time  Motor: No weakness  Comments: Mild tremor   Psychiatric:         Mood and Affect: Mood normal          Additional Data:     Labs:  Results from last 7 days   Lab Units 05/13/23  0421   WBC Thousand/uL 7 34   HEMOGLOBIN g/dL 12 8   HEMATOCRIT % 36 7   PLATELETS Thousands/uL 121*   NEUTROS PCT % 65   LYMPHS PCT % 14   MONOS PCT % 19*   EOS PCT % 1     Results from last 7 days   Lab Units 05/13/23  0421   POTASSIUM mmol/L 3 9   CHLORIDE mmol/L 94*   CO2 mmol/L 24   BUN mg/dL 20   CREATININE mg/dL 1 12   CALCIUM mg/dL 9 2   ALK PHOS U/L 49   ALT U/L 18   AST U/L 25                   * I Have Reviewed All Lab Data Listed Above    * Additional Pertinent Lab Tests Reviewed: [facility] 66 Admission Reviewed      Last 24 Hours Medication List:   Current Facility-Administered Medications   Medication Dose Route Frequency Provider Last Rate   • acetaminophen  650 mg Oral Q6H PRN Harvey Black MD     • albuterol  2 puff Inhalation Q4H PRN Harvey Black MD     • amLODIPine  10 mg Oral HS Margarita Yanez MD     • ascorbic acid  500 mg Oral Daily April Bhagat MD     • carvedilol  12 5 mg Oral BID With [address] Mila Leger MD     • enoxaparin  40 mg Subcutaneous Daily Harvey Black MD     • ferrous sulfate  325 mg Oral Daily With Breakfast April Bhagat MD     • fluticasone-vilanterol  1 puff Inhalation Daily Harvey Black MD     • folic acid  1 mg Oral Daily Harvey Black MD     • gabapentin  100 mg Oral TID Margarita Yanez MD     • lisinopril  40 mg Oral Daily Margarita Yanez MD     • metFORMIN  500 mg Oral Daily With Breakfast Htet Que Crenshaw MD     • multivitamin-minerals  1 tablet Oral Daily Harvey Black MD     • naltrexone  50 mg Oral Daily Margarita Yanez MD     • pantoprazole  40 mg Oral Early Morning Harvey Black MD     • pravastatin  40 mg Oral Daily With [facility] [address], MD     • ranolazine  500 mg Oral Q12H Harvey Black MD     • tamsulosin  0 4 mg Oral Daily With [address], MD     • thiamine  100 mg Oral Daily Harvey Balck MD               ** Please Note: Dictation voice to text software may have been used in the creation of this document   Karen Barron DO  05/13/23  8:58 AM

## 2023-05-13 NOTE — DISCHARGE SUMMARY
[facility] 26 Discharge Summary - Medical Franklin Leach 66 y o  male MRN: [id number]    Norma 45  Room / Bed: 908 [address] [place] [address] Encounter: [id number]    BRIEF OVERVIEW  Admitting Provider: Monserrta Cao MD  Discharge Provider: No att  providers found    Discharge To: Home    Outpatient Follow-Up: [facility] 26    Things to address at first follow up visit:   How is your drinking? Do you have plans for alcoholic rehab? Has naltrexone been working for you? How is your blood pressure? Patient was started on 10 mg amlodipine during hospitalization and discharged on 5 mg amlodipine daily; check blood pressure in office    Labs and results pending at discharge: none    Admission Date: 5/11/2023     Discharge Date: 5/13/2023 11:52 AM    Primary Discharge Diagnosis  Principal Problem:    Alcohol intoxication ([facility] 75 )  Active Problems:    COPD (chronic obstructive pulmonary disease) ([facility] 75 )    Type 2 diabetes mellitus ([facility] 75 )    Essential hypertension    Dyslipidemia    CAD (coronary artery disease)    Nicotine abuse    Hyponatremia    Chronic heart failure with preserved ejection fraction (HCC)    Stage 3a chronic kidney disease ([facility] 75 )  Resolved Problems:    * No resolved hospital problems  *      Consulting Providers   None        DETAILS OF HOSPITAL STAY    HPI per admission  Franklin Leach is a 66 y o  male with a PMHx that includes chronic alcohol use, hypertension, hyperlipidemia, COPD, CAD who presents for alcohol intoxication  Patient states his neighbors called EMS, and reports he drank half a bottle of vodka  Patient reports he drinks a quart of vodka daily  He states he remembers falling, but is unsure if he lost consciousness  Denies presyncopal symptoms, chest pain, shortness of breath, nausea, vomiting, diarrhea    Patient reports he does not wish to stay in the hospital as he is looking to buy a tractor for his grandson, and states that he does not have the money by Saturday, the seller will put it up for auction  States he did not  Denies any other complaints at this time  Hospital Course  Patient was placed on CIWA protocol with Ativan with fall precautions and monitoring of withdrawal   Patient was started on thiamine, folate, and ferrous sulfate  Additionally, patient's blood pressure was elevated and he was started on amlodipine 10 mg daily  Patient was requesting discharge and found stable on day of discharge  Patient's Imodium was decreased to 5 mg daily on discharge with outpatient follow-up  Patient discharged with naltrexone and close outpatient follow-up with [facility] practice      Physical Exam at Discharge  General appearance: alert  Head: Normocephalic, without obvious abnormality, atraumatic  Lungs: clear to auscultation bilaterally  Heart: regular rate and rhythm, S1, S2 normal, no murmur, click, rub or gallop  Abdomen: soft, non-tender; bowel sounds normal; no masses,  no organomegaly  Extremities: No edema noted bilaterally    Medications   Discharge Medication List as of 5/13/2023 11:39 AM          Discharge Medication List as of 5/13/2023 11:39 AM      CONTINUE these medications which have NOT CHANGED    Details   Blood Glucose Monitoring Suppl (ONE TOUCH ULTRA MINI) w/Device KIT Use as directed, Starting Thu 5/16/2019, Historical Med      carvedilol (COREG) 12 5 mg tablet Take 1 tablet (12 5 mg total) by mouth 2 (two) times a day with meals, Starting Fri 2/10/2023, Normal      ferrous sulfate 325 (65 Fe) mg tablet Take 1 tablet (325 mg total) by mouth 2 (two) times a day with meals, Starting Mon 11/28/2022, Until Thu 9/59/7356, Normal      folic acid (FOLVITE) 1 mg tablet TAKE 1 TABLET DAILY, Normal      gabapentin (NEURONTIN) 300 mg capsule TAKE ONE CAPSULE THREE TIMES DAILY, Normal      lisinopril (ZESTRIL) 20 mg tablet Take 1 tablet (20 mg total) by mouth daily, Starting Fri 11/25/2022, Normal      magnesium Oxide (MAG-OX) 400 mg TABS TAKE 1 TABLET TWO TIMES A DAY, Normal      metFORMIN (GLUCOPHAGE) 500 mg tablet TAKE 1 TABLET DAILY WITH BREAKFAST, Normal      pravastatin (PRAVACHOL) 40 mg tablet TAKE 1 TABLET DAILY WITH DINNER, Normal      ranolazine (RANEXA) 500 mg 12 hr tablet TAKE 1 TABLET EVERY 12 HOURS, Normal      tamsulosin (FLOMAX) 0 4 mg TAKE 1 CAPSULE DAILY, Normal      Thiamine HCl (vitamin B-1) 100 MG TABS TAKE 1 TABLET DAILY, Normal      albuterol (2 5 mg/3 mL) 0 083 % nebulizer solution Take 3 mL (2 5 mg total) by nebulization every 6 (six) hours as needed for wheezing or shortness of breath, Starting Tue 1/17/2023, Normal      albuterol (Ventolin HFA) 90 mcg/act inhaler Inhale 2 puffs every 4 (four) hours as needed for wheezing, Starting Tue 1/17/2023, Normal      Breo Ellipta 200-25 MCG/ACT inhaler INHALE 1 PUFF DAILY RINSE MOUTH AFTER USE , Normal      ONETOUCH DELICA LANCETS FINE MISC 3 (three) times a day Test, Starting u 5/16/2019, Historical Med      pantoprazole (PROTONIX) 40 mg tablet Take 1 tablet (40 mg total) by mouth daily in the early morning Do not start before November 26, 2022 , Starting Sat 11/26/2022, Normal            Discharge Medication List as of 5/13/2023 11:39 AM      START taking these medications    Details   amLODIPine (NORVASC) 5 mg tablet Take 1 tablet (5 mg total) by mouth daily, Starting Sat 5/13/2023, Normal      naltrexone (REVIA) 50 mg tablet Take 1 tablet (50 mg total) by mouth daily Do not start before May 14, 2023 , Starting Sun 5/14/2023, Normal            Discharge Medication List as of 5/13/2023 11:39 AM             Procedures Performed/Pertinent Test results  CT head without contrast   Final Result      No acute intracranial abnormality  Workstation performed: [id number]         CT cervical spine without contrast   Final Result      No cervical spine fracture or traumatic malalignment                    Workstation performed: [id number]         XR chest portable    (Results Pending)           Allergies  No Known Allergies    Diet restrictions: none  Activity restrictions: none  Code Status: Level 1 - Full Code  Advance Directive and Living Will: <no information>  Power of :    POLST:      Discharge Condition: stable      Discharge  Statement   I spent 30 minutes discharging the patient  This time was spent on the day of discharge  I had direct contact with the patient on the day of discharge  Additional documentation is required if more than 30 minutes were spent on discharge

## 2023-05-13 NOTE — PLAN OF CARE
Problem: PAIN - ADULT  Goal: Verbalizes/displays adequate comfort level or baseline comfort level  Description: Interventions:  - Encourage patient to monitor pain and request assistance  - Assess pain using appropriate pain scale  - Administer analgesics based on type and severity of pain and evaluate response  - Implement non-pharmacological measures as appropriate and evaluate response  - Consider cultural and social influences on pain and pain management  - Notify physician/advanced practitioner if interventions unsuccessful or patient reports new pain  Outcome: Progressing     Problem: SAFETY ADULT  Goal: Patient will remain free of falls  Description: INTERVENTIONS:  - Educate patient/family on patient safety including physical limitations  - Instruct patient to call for assistance with activity   - Consult OT/PT to assist with strengthening/mobility   - Keep Call bell within reach  - Keep bed low and locked with side rails adjusted as appropriate  - Keep care items and personal belongings within reach  - Initiate and maintain comfort rounds  - Make Fall Risk Sign visible to staff  - Offer Toileting every 2 Hours, in advance of need  - Initiate/Maintain bed alarm  - Obtain necessary fall risk management equipment: N/A  - Apply yellow socks and bracelet for high fall risk patients  - Consider moving patient to room near nurses station  Outcome: Progressing  Goal: Maintain or return to baseline ADL function  Description: INTERVENTIONS:  -  Assess patient's ability to carry out ADLs; assess patient's baseline for ADL function and identify physical deficits which impact ability to perform ADLs (bathing, care of mouth/teeth, toileting, grooming, dressing, etc )  - Assess/evaluate cause of self-care deficits   - Assess range of motion  - Assess patient's mobility; develop plan if impaired  - Assess patient's need for assistive devices and provide as appropriate  - Encourage maximum independence but intervene and supervise when necessary  - Involve family in performance of ADLs  - Assess for home care needs following discharge   - Consider OT consult to assist with ADL evaluation and planning for discharge  - Provide patient education as appropriate  Outcome: Progressing  Goal: Maintains/Returns to pre admission functional level  Description: INTERVENTIONS:  - Perform BMAT or MOVE assessment daily    - Set and communicate daily mobility goal to care team and patient/family/caregiver  - Collaborate with rehabilitation services on mobility goals if consulted  - Perform Range of Motion 3 times a day  - Reposition patient every 2 hours  - Dangle patient 3 times a day  - Stand patient 3 times a day  - Ambulate patient 3 times a day  - Out of bed to chair 3 times a day   - Out of bed for meals 3 times a day  - Out of bed for toileting  - Record patient progress and toleration of activity level   Outcome: Progressing     Problem: DISCHARGE PLANNING  Goal: Discharge to home or other facility with appropriate resources  Description: INTERVENTIONS:  - Identify barriers to discharge w/patient and caregiver  - Arrange for needed discharge resources and transportation as appropriate  - Identify discharge learning needs (meds, wound care, etc )  - Refer to Case Management Department for coordinating discharge planning if the patient needs post-hospital services based on physician/advanced practitioner order or complex needs related to functional status, cognitive ability, or social support system  Outcome: Progressing     Problem: Knowledge Deficit  Goal: Patient/family/caregiver demonstrates understanding of disease process, treatment plan, medications, and discharge instructions  Description: Complete learning assessment and assess knowledge base    Interventions:  - Provide teaching at level of understanding  - Provide teaching via preferred learning methods  Outcome: Progressing

## 2023-05-16 ENCOUNTER — RA CDI HCC (OUTPATIENT)
Dept: OTHER | Facility: HOSPITAL | Age: 63
End: 2023-05-16

## 2023-05-16 NOTE — UTILIZATION REVIEW
"Continued Stay Review    OBS 05-11-23 @ 2141 CONVERTED TO INPATIENT 05-13-23 @ 0905 FOR CONTINUATION OF CARE FOR ALCOHOL INTOXICATION  Inpatient Admission  Once        Transfer Service: Family Medicine       Question Answer Comment   Level of Care Med Surg    Estimated length of stay More than 2 Midnights    Certification I certify that inpatient services are medically necessary for this patient for a duration of greater than two midnights  See H&P and MD Progress Notes for additional information about the patient's course of treatment  05/13/23 0905               Orders Placed This Encounter   Procedures   • Place in Observation     Standing Status:   Standing     Number of Occurrences:   1     Order Specific Question:   Level of Care     Answer:   Med Surg [16]   • Inpatient Admission     Standing Status:   Standing     Number of Occurrences:   1     Order Specific Question:   Level of Care     Answer:   Med Surg [16]     Order Specific Question:   Estimated length of stay     Answer:   More than 2 Midnights     Order Specific Question:   Certification     Answer:   I certify that inpatient services are medically necessary for this patient for a duration of greater than two midnights  See H&P and MD Progress Notes for additional information about the patient's course of treatment  Date:05-13-23                           Current Patient Class: inpatient  Current Level of Care: medical     HPI:63 y o  male initially admitted on *05-13-23      Assessment/Plan: CIWA protocol with Ativan Continue Thiamine, Folate, ferrous sulfate CIWA 15-  5 -9 -5 Mild tremor noted on exam Na 127 on admission likely hypovolemic secondary to poor PO intake  Increased to 133 this morning  Ecchymosis on right forearm  Right elbow dressing C/D/I  IVF NS at 75 ml/hr Patient was seen and examined at bedside   He states he wants \"to get out of this place\" and is asking when he would receive his breakfast  However, he was " pleasant during exam  He denies any tremors, dizziness, headaches, abdominal pain, sob, chest pain, palpitations at this time         Vital Signs:   Date/Time Temp Pulse Resp BP MAP (mmHg) SpO2 Calculated FIO2 (%) - Nasal Cannula Nasal Cannula O2 Flow Rate (L/min) O2 Device Patient Position - Orthostatic VS   05/13/23 09:01:18 97 7 °F (36 5 °C) 116 Abnormal  18 143/79 100 92 % -- -- None (Room air) Lying   05/13/23 0719 -- 87 -- -- -- 90 % -- -- -- --   05/13/23 05:11:44 -- 85 -- 141/83 102 91 % -- -- -- --   05/13/23 03:59:24 -- 93 -- 150/81 104 93 % -- -- -- --   05/13/23 01:45:19 -- 89 -- 125/74 91 90 % -- -- -- --   05/13/23 0000 -- -- -- -- -- 93 % 28 2 L/min Nasal cannula --   05/12/23 23:51:27 98 8 °F (37 1 °C) 93 18 138/76 97 89 % Abnormal   -- -- -- --   SpO2: O2 placed at 05/12/23 2351   05/12/23 22:39:39 100 7 °F (38 2 °C) Abnormal  97 19 176/90 Abnormal  119 90 % -- -- -- --   05/12/23 22:23:29 99 3 °F (37 4 °C) 96 17 173/86 Abnormal  115 93 % -- -- -- --   05/12/23 2100 -- 93 -- 191/101 Abnormal   131 94 % -- -- -- --   BP: Amlodipine given at 05/12/23 2100 05/12/23 19:10:28 99 5 °F (37 5 °C) 94 18 157/77 104 90 % -- -- -- --   05/12/23 1806 -- -- -- 176/90 Abnormal  -- -- -- -- -- --   05/12/23 1650 -- -- -- 180/84 Abnormal  -- -- -- -- -- --   05/12/23 1530 -- -- -- 186/96 Abnormal  -- -- -- -- -- --   05/12/23 1500 99 7 °F (37 6 °C) 105 -- 186/96 Abnormal  -- 91 % -- -- None (Room air)      Pertinent Labs/Diagnostic Results:       Results from last 7 days   Lab Units 05/13/23 0421 05/12/23 0442 05/11/23 2048   WBC Thousand/uL 7 34 4 62 7 05   HEMOGLOBIN g/dL 12 8 12 6 13 1   HEMATOCRIT % 36 7 36 5 36 9   PLATELETS Thousands/uL 121* 129* 150   NEUTROS ABS Thousands/µL 4 81 2 48 4 68         Results from last 7 days   Lab Units 05/13/23 0421 05/12/23 0442 05/11/23 2048   SODIUM mmol/L 130* 133* 127*   POTASSIUM mmol/L 3 9 4 5 4 5   CHLORIDE mmol/L 94* 99 92*   CO2 mmol/L 24 25 24   ANION GAP mmol/L 12 9 11   BUN mg/dL 20 26* 25   CREATININE mg/dL 1 12 1 27 1 13   EGFR ml/min/1 73sq m 70 60 69   CALCIUM mg/dL 9 2 8 4 8 4     Results from last 7 days   Lab Units 05/13/23  0421 05/12/23  0442 05/11/23  2048   AST U/L 25 28 31   ALT U/L 18 20 22   ALK PHOS U/L 49 56 59   TOTAL PROTEIN g/dL 7 2 6 6 7 2   ALBUMIN g/dL 3 9 3 6 4 0   TOTAL BILIRUBIN mg/dL 0 64 0 31 0 36         Results from last 7 days   Lab Units 05/13/23  0421 05/12/23  0442 05/11/23  2048   GLUCOSE RANDOM mg/dL 163* 139 115       Results from last 7 days   Lab Units 05/12/23  1544   AMPH/METH  Negative   BARBITURATE UR  Negative   BENZODIAZEPINE UR  Negative   COCAINE UR  Negative   METHADONE URINE  Negative   OPIATE UR  Negative   PCP UR  Negative   THC UR  Negative     Results from last 7 days   Lab Units 05/11/23  2048   ETHANOL LVL mg/dL 367*                                 Medications:   Scheduled Medications:  No current facility-administered medications for this encounter  Continuous IV Infusions:  No current facility-administered medications for this encounter  PRN Meds:  No current facility-administered medications for this encounter  Discharge Plan: [facility]    Network Utilization Review Department  ATTENTION: Please call with any questions or concerns to [phone] and carefully listen to the prompts so that you are directed to the right person  All voicemails are confidential   Janeice Curling all requests for admission clinical reviews, approved or denied determinations and any other requests to dedicated fax number below belonging to the campus where the patient is receiving treatment   List of dedicated fax numbers for the Facilities:  [address] [place] [address] DENIALS (Administrative/Medical Necessity) [phone]   [address] [place] [address] (Maternity/NICU/Pediatrics) Maye Leahy [address] 406 [facility] [address] [place] [address] Brigida Blanco [facility] [address] [facility] [place] [address] [phone]

## 2023-05-16 NOTE — UTILIZATION REVIEW
NOTIFICATION OF ADMISSION DISCHARGE   This is a Notification of Discharge from [facility]  Please be advised that this patient has been discharge from our facility  Below you will find the admission and discharge date and time including the patient’s disposition  UTILIZATION REVIEW CONTACT:  Lakshmi Mcmillan  Utilization   Network Utilization Review Department  Phone: [phone] x carefully listen to the prompts  All voicemails are confidential   Email: [email or web address]     ADMISSION INFORMATION  PRESENTATION DATE: 5/11/2023  7:49 PM  OBERVATION ADMISSION DATE:   INPATIENT ADMISSION DATE: 5/13/23  9:05 AM   DISCHARGE DATE: 5/13/2023 11:52 AM   DISPOSITION:Home/Self Care    IMPORTANT INFORMATION:  Send all requests for admission clinical reviews, approved or denied determinations and any other requests to dedicated fax number below belonging to the campus where the patient is receiving treatment   List of dedicated fax numbers:  [address] DENIALS (Administrative/Medical Necessity) [phone]   [address] (Maternity/NICU/Pediatrics) [phone]   [facility] [phone]   [facility] [phone]   Caseyesa Gaiola 134 [phone]   220 [facility] [phone]   90 [facility] [phone]   [address] [place] [address] 119 [phone]   [facility]  [phone]   4055 [facility] [phone]   412 [facility] 850 [facility] [phone]

## 2023-05-16 NOTE — PROGRESS NOTES
[facility]  coding opportunities     I13 0 and E11 22     Chart Reviewed number of suggestions sent to Provider: 2     Patients Insurance     Medicare Insurance: [address] [place] [address]

## 2023-05-17 ENCOUNTER — PATIENT OUTREACH (OUTPATIENT)
Dept: FAMILY MEDICINE CLINIC | Facility: CLINIC | Age: 63
End: 2023-05-17

## 2023-05-17 NOTE — PROGRESS NOTES
Received call from patient  Pt wanted to discuss his issues  Pt recently in ED for alcohol  Intoxication  Discussed causes for most recent intoxication/drinking episode  Pt is involved with [address] [place] [address] [facility]  Pt is being considered for  inpatient detox rehab, 30 days  Pt is considering a facility in [facility]  Right now patient is  non compliant with [employer] program Pt reports this is a court ordered program because he wants to get license back  Pt was going to outpatient,  16 week [facility] Herron program however was dismissed from program because he took too much time off due to other health issues  Pt will continue to work with Brodie Herron to arrange for detox placement  Pt will call back RN CM if any new issues arise

## 2023-05-30 DIAGNOSIS — F10.10 ALCOHOL ABUSE: Chronic | ICD-10-CM

## 2023-05-30 RX ORDER — LANOLIN ALCOHOL/MO/W.PET/CERES
CREAM (GRAM) TOPICAL
Qty: 60 TABLET | Refills: 2 | Status: SHIPPED | OUTPATIENT
Start: 2023-05-30

## 2023-06-02 ENCOUNTER — HOSPITAL ENCOUNTER (EMERGENCY)
Facility: HOSPITAL | Age: 63
Discharge: HOME | End: 2023-06-02
Attending: EMERGENCY MEDICINE
Payer: COMMERCIAL

## 2023-06-02 ENCOUNTER — APPOINTMENT (EMERGENCY)
Dept: RADIOLOGY | Facility: HOSPITAL | Age: 63
End: 2023-06-02
Payer: COMMERCIAL

## 2023-06-02 VITALS
BODY MASS INDEX: 32.08 KG/M2 | SYSTOLIC BLOOD PRESSURE: 136 MMHG | HEIGHT: 74 IN | WEIGHT: 250 LBS | DIASTOLIC BLOOD PRESSURE: 93 MMHG | RESPIRATION RATE: 20 BRPM | HEART RATE: 89 BPM | OXYGEN SATURATION: 95 % | TEMPERATURE: 98.7 F

## 2023-06-02 DIAGNOSIS — J18.9 PNEUMONIA: Primary | ICD-10-CM

## 2023-06-02 DIAGNOSIS — J44.1 COPD EXACERBATION (HCC): ICD-10-CM

## 2023-06-02 PROCEDURE — 71045 X-RAY EXAM CHEST 1 VIEW: CPT

## 2023-06-02 PROCEDURE — 94640 AIRWAY INHALATION TREATMENT: CPT

## 2023-06-02 PROCEDURE — 99284 EMERGENCY DEPT VISIT MOD MDM: CPT

## 2023-06-02 RX ORDER — PREDNISONE 20 MG/1
40 TABLET ORAL DAILY
Qty: 10 TABLET | Refills: 0 | Status: SHIPPED | OUTPATIENT
Start: 2023-06-02 | End: 2023-06-07

## 2023-06-02 RX ORDER — AZITHROMYCIN 250 MG/1
500 TABLET, FILM COATED ORAL ONCE
Status: DISCONTINUED | OUTPATIENT
Start: 2023-06-02 | End: 2023-06-02

## 2023-06-02 RX ORDER — PREDNISONE 20 MG/1
60 TABLET ORAL ONCE
Status: COMPLETED | OUTPATIENT
Start: 2023-06-02 | End: 2023-06-02

## 2023-06-02 RX ORDER — LEVOFLOXACIN 750 MG/1
750 TABLET ORAL DAILY
Qty: 5 TABLET | Refills: 0 | Status: SHIPPED | OUTPATIENT
Start: 2023-06-02 | End: 2023-06-07

## 2023-06-02 RX ORDER — ALBUTEROL SULFATE 90 UG/1
2 AEROSOL, METERED RESPIRATORY (INHALATION) ONCE
Status: COMPLETED | OUTPATIENT
Start: 2023-06-02 | End: 2023-06-02

## 2023-06-02 RX ORDER — IPRATROPIUM BROMIDE AND ALBUTEROL SULFATE 2.5; .5 MG/3ML; MG/3ML
3 SOLUTION RESPIRATORY (INHALATION) ONCE
Status: COMPLETED | OUTPATIENT
Start: 2023-06-02 | End: 2023-06-02

## 2023-06-02 RX ADMIN — LEVOFLOXACIN 750 MG: 500 TABLET, FILM COATED ORAL at 21:01

## 2023-06-02 RX ADMIN — IPRATROPIUM BROMIDE AND ALBUTEROL SULFATE 3 ML: .5; 3 SOLUTION RESPIRATORY (INHALATION) at 19:54

## 2023-06-02 RX ADMIN — ALBUTEROL SULFATE 2 PUFF: 90 AEROSOL, METERED RESPIRATORY (INHALATION) at 21:01

## 2023-06-02 RX ADMIN — PREDNISONE 60 MG: 20 TABLET ORAL at 19:54

## 2023-06-02 NOTE — ED PROVIDER NOTES
History  Chief Complaint   Patient presents with   • Back Pain   • Cough     Pt states cough and back pain started 5 days ago     65 yo male c/o pain across upper back/shoulder blades with worsening cough x 5 days  Cough is productive of phlegm and breathing is tight  +smoker  Had been on inhaler in the past but never got it refilled  No fever, vomiting, diarrhea  No trauma  History provided by:  Patient   used: No    Back Pain  Associated symptoms: no fever    Cough  Associated symptoms: shortness of breath and wheezing    Associated symptoms: no fever        Prior to Admission Medications   Prescriptions Last Dose Informant Patient Reported? Taking? Blood Glucose Monitoring Suppl (ONE TOUCH ULTRA MINI) w/Device KIT  Self Yes No   Sig: Use as directed   Breo Ellipta 200-25 MCG/ACT inhaler  Self No No   Sig: INHALE 1 PUFF DAILY RINSE MOUTH AFTER USE     ONETOUCH DELICA LANCETS FINE MISC  Self Yes No   Sig: 3 (three) times a day Test   Thiamine HCl (vitamin B-1) 100 MG TABS  Self No No   Sig: TAKE 1 TABLET DAILY   albuterol (2 5 mg/3 mL) 0 083 % nebulizer solution   No No   Sig: Take 3 mL (2 5 mg total) by nebulization every 6 (six) hours as needed for wheezing or shortness of breath   albuterol (Ventolin HFA) 90 mcg/act inhaler   No No   Sig: Inhale 2 puffs every 4 (four) hours as needed for wheezing   amLODIPine (NORVASC) 5 mg tablet   No No   Sig: Take 1 tablet (5 mg total) by mouth daily   carvedilol (COREG) 12 5 mg tablet   No No   Sig: Take 1 tablet (12 5 mg total) by mouth 2 (two) times a day with meals   ferrous sulfate 325 (65 Fe) mg tablet   No No   Sig: Take 1 tablet (325 mg total) by mouth daily with breakfast   folic acid (FOLVITE) 1 mg tablet   No No   Sig: TAKE 1 TABLET DAILY   gabapentin (NEURONTIN) 300 mg capsule   No No   Sig: TAKE ONE CAPSULE THREE TIMES DAILY   lisinopril (ZESTRIL) 20 mg tablet  Self No No   Sig: Take 1 tablet (20 mg total) by mouth daily   magnesium Oxide (MAG-OX) 400 mg TABS   No No   Sig: TAKE 1 TABLET TWO TIMES A DAY   metFORMIN (GLUCOPHAGE) 500 mg tablet   No No   Sig: TAKE 1 TABLET DAILY WITH BREAKFAST   naltrexone (REVIA) 50 mg tablet   No No   Sig: Take 1 tablet (50 mg total) by mouth daily Do not start before May 14, 2023  pantoprazole (PROTONIX) 40 mg tablet  Self No No   Sig: Take 1 tablet (40 mg total) by mouth daily in the early morning Do not start before November 26, 2022  pravastatin (PRAVACHOL) 40 mg tablet   No No   Sig: TAKE 1 TABLET DAILY WITH DINNER   ranolazine (RANEXA) 500 mg 12 hr tablet   No No   Sig: TAKE 1 TABLET EVERY 12 HOURS   tamsulosin (FLOMAX) 0 4 mg  Self No No   Sig: TAKE 1 CAPSULE DAILY      Facility-Administered Medications: None       Past Medical History:   Diagnosis Date   • Acute on chronic kidney failure (HCC)    • Alcohol withdrawal ([facility] ) 6/7/2019   • Cancer ([facility] )     prostate ca,had radiation   • Cardiac disease     stents,then triple bypass   • COPD (chronic obstructive pulmonary disease) ([facility] )    • ETOH abuse    • Hx of heart artery stent     2014   • Hyperlipidemia    • Hypertension    • Hypovolemic shock ([facility] ) 12/22/2019   • Lumbar spondylitis ([facility] ) 10/13/2022   • Nasal bone fracture 10/10/2022   • Prostate CA ([facility] )    • S/P CABG x 1     2004       Past Surgical History:   Procedure Laterality Date   • CORONARY ARTERY BYPASS GRAFT  2004   • MS ARTHRD ANT INTERBODY  [facility] CRV BELOW C2 N/A 12/16/2020    Procedure: Anterior cervical discectomy with fusion C4-C7; Posterior cervical decompression and fusion C2-T2;  Surgeon: Regina Nuñez MD;  Location: [facility];  Service: Neurosurgery   • TONSILLECTOMY         Family History   Problem Relation Age of Onset   • Diabetes Mother    • Uterine cancer Mother    • COPD Father    • Hypertension Father      I have reviewed and agree with the history as documented      E-Cigarette/Vaping   • E-Cigarette Use Never User      E-Cigarette/Vaping Substances   • Nicotine Yes    • THC No    • CBD No    • Flavoring No    • Other No    • Unknown No      Social History     Tobacco Use   • Smoking status: Every Day     Packs/day: 1 00     Years: 40 00     Total pack years: 40 00     Types: Cigarettes   • Smokeless tobacco: Never   Vaping Use   • Vaping Use: Never used   Substance Use Topics   • Alcohol use: Not Currently     Alcohol/week: 4 0 standard drinks of alcohol     Types: 4 Standard drinks or equivalent per week   • Drug use: No       Review of Systems   Constitutional: Negative for fever  HENT: Positive for congestion  Respiratory: Positive for cough, chest tightness, shortness of breath and wheezing  Gastrointestinal: Negative for diarrhea and vomiting  Musculoskeletal: Positive for back pain  All other systems reviewed and are negative  Physical Exam  Physical Exam  Vitals and nursing note reviewed  Constitutional:       General: He is not in acute distress  Appearance: He is well-developed  He is not ill-appearing or diaphoretic  HENT:      Head: Normocephalic and atraumatic  Eyes:      General: No scleral icterus  Conjunctiva/sclera: Conjunctivae normal    Cardiovascular:      Rate and Rhythm: Normal rate and regular rhythm  Heart sounds: Normal heart sounds  No murmur heard  Pulmonary:      Effort: Pulmonary effort is normal  No respiratory distress  Breath sounds: Decreased breath sounds, wheezing and rhonchi present  Chest:      Chest wall: No tenderness  Abdominal:      General: There is no distension  Palpations: Abdomen is soft  Musculoskeletal:         General: No tenderness or deformity  Normal range of motion  Cervical back: Normal range of motion and neck supple  Right lower leg: No edema  Left lower leg: No edema  Skin:     General: Skin is warm and dry  Coloration: Skin is not pale  Findings: No erythema or rash  Neurological:      General: No focal deficit present        Mental Status: He is alert and oriented to person, place, and time  Cranial Nerves: No cranial nerve deficit  Psychiatric:         Behavior: Behavior normal          Vital Signs  ED Triage Vitals [06/02/23 1933]   Temperature Pulse Respirations Blood Pressure SpO2   98 7 °F (37 1 °C) 89 20 136/93 95 %      Temp Source Heart Rate Source Patient Position - Orthostatic VS BP Location FiO2 (%)   Oral Monitor Lying Right arm --      Pain Score       5           Vitals:    06/02/23 1933   BP: 136/93   Pulse: 89   Patient Position - Orthostatic VS: Lying         Visual Acuity      ED Medications  Medications   ipratropium-albuterol (DUO-NEB) 0 5-2 5 mg/3 mL inhalation solution 3 mL (3 mL Nebulization Given 6/2/23 1954)   predniSONE tablet 60 mg (60 mg Oral Given 6/2/23 1954)   albuterol (PROVENTIL HFA,VENTOLIN HFA) inhaler 2 puff (2 puffs Inhalation Given 6/2/23 2101)   levofloxacin (LEVAQUIN) tablet 750 mg (750 mg Oral Given 6/2/23 2101)       Diagnostic Studies  Results Reviewed     None                 XR chest 1 view portable   ED Interpretation by Tellis Shone, MD (93/61 [id number])   ? Right infiltrate, similar to prior                 Procedures  Procedures         ED Course                               SBIRT 22yo+    Flowsheet Row Most Recent Value   Initial Alcohol Screen: US AUDIT-C     1  How often do you have a drink containing alcohol? 0 Filed at: 06/02/2023 1937   2  How many drinks containing alcohol do you have on a typical day you are drinking? 0 Filed at: 06/02/2023 1937   3a  Male UNDER 65: How often do you have five or more drinks on one occasion? 0 Filed at: 06/02/2023 1937   Audit-C Score 0 Filed at: 06/02/2023 2491                    Medical Decision Making  Aeration and wheezing improved  Chest xray possible right infiltrate similar to prior xray though  Likely pneumonia vs  Bronchitis with COPD exacerbation  Pt  Advised stop smoking, take abx and prednisone as prescribed  Given inhaler for at home      Prior records and xrays reviewed  Amount and/or Complexity of Data Reviewed  Radiology: ordered  Risk  Prescription drug management  Disposition  Final diagnoses:   Pneumonia   COPD exacerbation ([address] )     Time reflects when diagnosis was documented in both MDM as applicable and the Disposition within this note     Time User Action Codes Description Comment    4/4/4937  3:22 PM Jenise Nowak A Add [B34 0] Pneumonia     7/4/3804  3:88 PM Jenise Nowak A Add [J23 5] COPD exacerbation [facility])       ED Disposition     ED Disposition   Discharge    Condition   Stable    Date/Time   Fri Jun 2, 2023  8:55 PM    Comment   Brayton Rinne discharge to home/self care                 Follow-up Information     Follow up With Specialties Details Why Contact Liat Benitez DO Family Medicine Schedule an appointment as soon as possible for a visit   [address]   [phone]            Discharge Medication List as of 6/2/2023  9:01 PM      START taking these medications    Details   levofloxacin (LEVAQUIN) 750 mg tablet Take 1 tablet (750 mg total) by mouth daily for 5 days, Starting Fri 6/2/2023, Until Wed 6/7/2023, Normal      predniSONE 20 mg tablet Take 2 tablets (40 mg total) by mouth daily for 5 days, Starting Fri 6/2/2023, Until Wed 6/7/2023, Normal         CONTINUE these medications which have NOT CHANGED    Details   albuterol (2 5 mg/3 mL) 0 083 % nebulizer solution Take 3 mL (2 5 mg total) by nebulization every 6 (six) hours as needed for wheezing or shortness of breath, Starting Tue 1/17/2023, Normal      albuterol (Ventolin HFA) 90 mcg/act inhaler Inhale 2 puffs every 4 (four) hours as needed for wheezing, Starting Tue 1/17/2023, Normal      amLODIPine (NORVASC) 5 mg tablet Take 1 tablet (5 mg total) by mouth daily, Starting Sat 5/13/2023, Normal      Blood Glucose Monitoring Suppl (ONE TOUCH ULTRA MINI) w/Device KIT Use as directed, Starting u 5/16/2019, Historical Med      Breo Ellipta 200-25 MCG/ACT inhaler INHALE 1 PUFF DAILY RINSE MOUTH AFTER USE , Normal      carvedilol (COREG) 12 5 mg tablet Take 1 tablet (12 5 mg total) by mouth 2 (two) times a day with meals, Starting Fri 2/10/2023, Normal      ferrous sulfate 325 (65 Fe) mg tablet Take 1 tablet (325 mg total) by mouth daily with breakfast, Starting Fri 5/9/1013, Normal      folic acid (FOLVITE) 1 mg tablet TAKE 1 TABLET DAILY, Normal      gabapentin (NEURONTIN) 300 mg capsule TAKE ONE CAPSULE THREE TIMES DAILY, Normal      lisinopril (ZESTRIL) 20 mg tablet Take 1 tablet (20 mg total) by mouth daily, Starting Fri 11/25/2022, Normal      magnesium Oxide (MAG-OX) 400 mg TABS TAKE 1 TABLET TWO TIMES A DAY, Normal      metFORMIN (GLUCOPHAGE) 500 mg tablet TAKE 1 TABLET DAILY WITH BREAKFAST, Normal      naltrexone (REVIA) 50 mg tablet Take 1 tablet (50 mg total) by mouth daily Do not start before May 14, 2023 , Starting Sun 5/14/2023, Normal      ONETOUCH DELICA LANCETS FINE MISC 3 (three) times a day Test, Starting Thu 5/16/2019, Historical Med      pantoprazole (PROTONIX) 40 mg tablet Take 1 tablet (40 mg total) by mouth daily in the early morning Do not start before November 26, 2022 , Starting Sat 11/26/2022, Normal      pravastatin (PRAVACHOL) 40 mg tablet TAKE 1 TABLET DAILY WITH DINNER, Normal      ranolazine (RANEXA) 500 mg 12 hr tablet TAKE 1 TABLET EVERY 12 HOURS, Normal      tamsulosin (FLOMAX) 0 4 mg TAKE 1 CAPSULE DAILY, Normal      Thiamine HCl (vitamin B-1) 100 MG TABS TAKE 1 TABLET DAILY, Normal             No discharge procedures on file      PDMP Review       Value Time User    PDMP Reviewed  Yes 7/26/2021  4:50 PM Danya Guidry MD          ED Provider  Electronically Signed by           Rashid Montero MD  82/22/06 0923

## 2023-06-03 NOTE — DISCHARGE INSTRUCTIONS
Take the antibiotic and prednisone as prescribed  Stop smoking  Use the inhaler 2 puffs every 4 hours for cough, wheezing or trouble breathing  It is fine to use an over the counter cough medicine as needed

## 2023-06-05 ENCOUNTER — VBI (OUTPATIENT)
Dept: ADMINISTRATIVE | Facility: OTHER | Age: 63
End: 2023-06-05

## 2023-06-05 NOTE — TELEPHONE ENCOUNTER
Yumiko Mckeon    ED Visit Information     Ed visit date: 6/2/2023   Diagnosis Description: cough   In Network? Yes Marlin Clement  Discharge status: Home  Discharged with meds ? Yes  Number of ED visits to date: 2  ED Severity:4     Outreach Information    Outreach successful: No 3  Date letter mailed: no my chart    Date Finalized: 6/7/2023    Care Coordination    Follow up appointment with pcp: no n/a  Transportation issues ?  NA    Value Base Outreach    Outreach type: 7 Day Outreach  Emergent necessity warranted by diagnosis: No  [facility] PCP: Yes  06/05/2023 11:33 AM EDT by Lopez Kaiser MA 06/05/2023 11:33 AM EDT by MILA Kumar (Self) [phone] ([phone] (Mobile)  [phone] (Mobile) Remove  - Left Message    06/06/2023 10:32 AM EDT by Lopez Kaiser MA 06/06/2023 10:32 AM EDT by MILA Kumar (Self) [phone] (QVISCB)[phone] (Mobile)  [phone] (Mobile) Remove  - Left Message    06/07/2023 08:49 AM EDT by Lopez Kaiser MA 06/07/2023 08:49 AM EDT by MILA Kumar (Self) [phone] (XHLZXU)[phone] (Mobile)  [phone] (Mobile) Remove  - Left Message

## 2023-06-07 ENCOUNTER — PATIENT OUTREACH (OUTPATIENT)
Dept: FAMILY MEDICINE CLINIC | Facility: CLINIC | Age: 63
End: 2023-06-07

## 2023-06-07 NOTE — PROGRESS NOTES
ADT alert received  Pt evaluated in ED  Diagnosed  with pneumonia  Outreach to patient  LVM requesting return call  RN CM contact information provided

## 2023-06-09 ENCOUNTER — PATIENT OUTREACH (OUTPATIENT)
Dept: FAMILY MEDICINE CLINIC | Facility: CLINIC | Age: 63
End: 2023-06-09

## 2023-06-09 ENCOUNTER — OFFICE VISIT (OUTPATIENT)
Dept: FAMILY MEDICINE CLINIC | Facility: CLINIC | Age: 63
End: 2023-06-09
Payer: COMMERCIAL

## 2023-06-09 VITALS
SYSTOLIC BLOOD PRESSURE: 130 MMHG | HEART RATE: 96 BPM | DIASTOLIC BLOOD PRESSURE: 86 MMHG | HEIGHT: 74 IN | OXYGEN SATURATION: 96 % | BODY MASS INDEX: 27.63 KG/M2 | WEIGHT: 215.3 LBS | RESPIRATION RATE: 18 BRPM

## 2023-06-09 DIAGNOSIS — J44.9 COPD (CHRONIC OBSTRUCTIVE PULMONARY DISEASE) (HCC): Primary | ICD-10-CM

## 2023-06-09 DIAGNOSIS — E11.00 TYPE 2 DIABETES MELLITUS WITH HYPEROSMOLARITY WITHOUT COMA, WITHOUT LONG-TERM CURRENT USE OF INSULIN (HCC): ICD-10-CM

## 2023-06-09 DIAGNOSIS — Z12.2 ENCOUNTER FOR SCREENING FOR LUNG CANCER: ICD-10-CM

## 2023-06-09 DIAGNOSIS — F17.210 SMOKING GREATER THAN 20 PACK YEARS: ICD-10-CM

## 2023-06-09 LAB — SL AMB POCT HEMOGLOBIN AIC: 5.6 (ref ?–6.5)

## 2023-06-09 PROCEDURE — 99406 BEHAV CHNG SMOKING 3-10 MIN: CPT | Performed by: FAMILY MEDICINE

## 2023-06-09 PROCEDURE — 99213 OFFICE O/P EST LOW 20 MIN: CPT | Performed by: FAMILY MEDICINE

## 2023-06-09 PROCEDURE — 83036 HEMOGLOBIN GLYCOSYLATED A1C: CPT | Performed by: FAMILY MEDICINE

## 2023-06-09 RX ORDER — VARENICLINE TARTRATE 25 MG
KIT ORAL
Qty: 53 EACH | Refills: 0 | Status: SHIPPED | OUTPATIENT
Start: 2023-06-09 | End: 2023-07-07

## 2023-06-09 RX ORDER — VARENICLINE TARTRATE 1 MG/1
1 TABLET, FILM COATED ORAL 2 TIMES DAILY
Qty: 60 TABLET | Refills: 1 | Status: SHIPPED | OUTPATIENT
Start: 2023-06-09 | End: 2023-06-09

## 2023-06-09 RX ORDER — FLUTICASONE FUROATE AND VILANTEROL TRIFENATATE 200; 25 UG/1; UG/1
1 POWDER RESPIRATORY (INHALATION) DAILY
Qty: 60 EACH | Refills: 3 | Status: SHIPPED | OUTPATIENT
Start: 2023-06-09

## 2023-06-09 NOTE — PROGRESS NOTES
[address] [place] [address]  Outpatient Visit - LISA Matias 9: 23     Patient's Information      Name: Gerard De La Fuente  Age/Sex: 66 y o  male  MRN: [id number]  : 1960      Assessment/Plan     A/P: Gerard De La Fuente is a 66 y o  male patient that came to the clinic today for COPD exacerbation  Chart reviewed  Plan below  COPD (chronic obstructive pulmonary disease) (HCC)    Pt stable  Completed antibiotic course  CTA x 2 and SpO2 96% on PE      · Continue Breo Ellipta 200-25 mcg/act, 1 puff, Inhalation, daily   · Continue Albuterol 90 mcg/act, 2 puff, Inhalation, Q4H PRN  · Encouraged smoking cessation  · Refilled Chantix for help with smoking cessation  · Ordered Lung CT for lung cancer screening   · Advised on S/S of COPD exacerbation/Pneumonia for which he should visit the ED or RTO     Type 2 diabetes mellitus ([facility] )    Lab Results   Component Value Date    HGBA1C 5 6 2023     HbA1c at goal  Stable and controlled  Pt on Metformin, Pravastatin and Lisinopril  Compliant with treatment  Microalbumin/Cr ratio 34  4  · Continue Metformin 500 mg, PO, QD w  breakfast  · Continue Lisinopril 20 mg, PO, QD  · Continue Pravastatin 40 mg, PO, QD   · Continue carb controlled diet   · Encouraged exercise regimen    Associated orders were discussed and explained to the pt  Pertinent care gaps were addressed  Pt voiced understanding and acceptance with A/P  Pt will call the office if any further questions/concerns  Next Visit: Return in about 4 weeks (around 2023) for Diabetic Foot Examination  A/P of patient's case was discussed with the Attending, Dr Christianne Bowles  Subjective     History of Present Illness      Chief Complaint   Patient presents with   • Infection     Lung infection  Was in ED last week and given antibiotics  Wants to know if antibiotic can be refilled  • Medication Reaction     Would like to have Chantix refilled  "    66 y o  male patient came to the clinic for follow up on ED visit for COPD exacerbation vs pneumonia  Pt mentions still having some mile dyspnea on exertion  Pt no longer coughing up a lot of phlegm  No fevers, chills, N/V, abdominal pain, diarrhea, or hemoptysis  Pt states feeling better  Breathing has improved  Pt wants to continue his Chantix for smoking cessation  I reviewed patient's hx and updated as appropriate if needed: allergies, current medications, PMHx, FHx, social hx, surgical hx, and problem list       Objective     Vital Signs     Visit Vitals  /86 (BP Location: Left arm, Patient Position: Sitting, Cuff Size: Standard)   Pulse 96   Resp 18   Ht 6' 2\" (1 88 m)   Wt 97 7 kg (215 lb 4 8 oz)   SpO2 96%   BMI 27 64 kg/m²   Smoking Status Every Day   BSA 2 24 m²      Physical Exam      Vitals and nursing note reviewed  Constitutional:       General: He is awake  He is not in acute distress  Appearance: Normal appearance  He is overweight  He is not ill-appearing or toxic-appearing  HENT:      Head: Normocephalic and atraumatic  Right Ear: External ear normal       Left Ear: External ear normal       Nose: Nose normal       Mouth/Throat:      Mouth: Mucous membranes are moist    Eyes:      General: No scleral icterus  Conjunctiva/sclera: Conjunctivae normal    Cardiovascular:      Rate and Rhythm: Normal rate and regular rhythm  Pulses: Normal pulses  Heart sounds: Normal heart sounds  Pulmonary:      Effort: Pulmonary effort is normal       Breath sounds: Normal breath sounds  Abdominal:      General: Abdomen is flat  There is no distension  Palpations: Abdomen is soft  Tenderness: There is no abdominal tenderness  Musculoskeletal:         General: Normal range of motion  Cervical back: Normal range of motion  Right lower leg: No edema  Left lower leg: No edema  Skin:     General: Skin is warm and dry        Capillary Refill: " "Capillary refill takes less than 2 seconds  Neurological:      Mental Status: He is alert and oriented to person, place, and time  Psychiatric:         Mood and Affect: Mood normal          Behavior: Behavior is cooperative  Thought Content: Thought content normal          Judgment: Judgment normal           It was a pleasure being of service to Gideon Fuentes  Thank you  oRxanna Johnson MD , [facility]  [address]      06/09/23   1:45 PM          Portions of the record may have been created with voice recognition software  Occasional wrong word or \"sound a like\" substitutions may have occurred due to the inherent limitations of voice recognition software  Read the chart carefully and recognize, using context, where substitutions have occurred     "

## 2023-06-09 NOTE — PATIENT INSTRUCTIONS
Complete CT scan for you lungs - Screening for lung cancer  Call and schedule your colonoscopy  Continue your current medications   Use your Breo Ellipta inhaler, 1 puff, daily and wash your mouth out after

## 2023-06-09 NOTE — PROGRESS NOTES
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

## 2023-06-10 LAB
ALBUMIN/CREAT UR: 272 MG/G CREAT (ref 0–29)
CREAT UR-MCNC: 34.4 MG/DL
MICROALBUMIN UR-MCNC: 93.5 UG/ML

## 2023-06-11 NOTE — ASSESSMENT & PLAN NOTE
Lab Results   Component Value Date    HGBA1C 5 6 06/09/2023     HbA1c at goal  Stable and controlled  Pt on Metformin, Pravastatin and Lisinopril  Compliant with treatment       · Continue Metformin 500 mg, PO, QD w  breakfast  · Continue Lisinopril 20 mg, PO, QD  · Continue Pravastatin 40 mg, PO, QD   · Continue carb controlled diet   · Encouraged exercise regimen
Pt stable  Completed antibiotic course   CTA x 2 and SpO2 96% on PE      · Continue Breo Ellipta 200-25 mcg/act, 1 puff, Inhalation, daily   · Continue Albuterol 90 mcg/act, 2 puff, Inhalation, Q4H PRN  · Encouraged smoking cessation  · Refilled Chantix for help with smoking cessation  · Ordered Lung CT for lung cancer screening   · Advised on S/S of COPD exacerbation/Pneumonia for which he should visit the ED or RTO
Harshil Fong

## 2023-06-13 ENCOUNTER — OFFICE VISIT (OUTPATIENT)
Dept: FAMILY MEDICINE CLINIC | Facility: CLINIC | Age: 63
End: 2023-06-13
Payer: COMMERCIAL

## 2023-06-13 VITALS
TEMPERATURE: 97.8 F | BODY MASS INDEX: 24.58 KG/M2 | HEART RATE: 79 BPM | HEIGHT: 74 IN | RESPIRATION RATE: 21 BRPM | SYSTOLIC BLOOD PRESSURE: 122 MMHG | WEIGHT: 191.5 LBS | OXYGEN SATURATION: 98 % | DIASTOLIC BLOOD PRESSURE: 54 MMHG

## 2023-06-13 DIAGNOSIS — I50.32 CHRONIC HEART FAILURE WITH PRESERVED EJECTION FRACTION (HCC): ICD-10-CM

## 2023-06-13 DIAGNOSIS — J44.9 CHRONIC OBSTRUCTIVE PULMONARY DISEASE, UNSPECIFIED COPD TYPE (HCC): Primary | Chronic | ICD-10-CM

## 2023-06-13 PROCEDURE — 99213 OFFICE O/P EST LOW 20 MIN: CPT | Performed by: FAMILY MEDICINE

## 2023-06-13 RX ORDER — VARENICLINE TARTRATE 0.5 MG/1
TABLET, FILM COATED ORAL
COMMUNITY
Start: 2023-06-09

## 2023-06-14 NOTE — PROGRESS NOTES
Virtual Brief Visit    This Visit is being completed by telephone  The Patient is located at Other and in the following state in which I hold an active license [place]    The patient was identified by name and date of birth  Matthieu Merritt was informed that this is a telemedicine visit and that the visit is being conducted through the [employer]  He agrees to proceed     My office door was closed  No one else was in the room  He acknowledged consent and understanding of privacy and security of the video platform  The patient has agreed to participate and understands they can discontinue the visit at any time  Patient is aware this is a billable service  Assessment/Plan:    Problem List Items Addressed This Visit        Respiratory    COPD (chronic obstructive pulmonary disease) ([facility] ) - Primary (Chronic)       Cardiovascular and Mediastinum    Chronic heart failure with preserved ejection fraction ([facility] )     Advised patient his symptoms do not sound like apparent pneumonia and I would not be prescribing antibiotics  To this, patient stated he would go to the emergency department, which I advised is not necessary however it would be helpful to examine him in the office  Patient is uncertain if he has had changes in his weight, however he said his pain occurs more at night not necessarily in supine position and he denies shortness of breath, rather he reports discomfort with deep breaths and coughing, so suspicion for CHF exacerbation is low  Recent Visits  Date Type Provider Dept   06/09/23 Office Visit MD Carlo [facility]   Showing recent visits within past 7 days and meeting all other requirements  Today's Visits  Date Type Provider Dept   06/13/23 Office Visit DO Carlo Cyr   Showing today's visits and meeting all other requirements  Future Appointments  No visits were found meeting these conditions    Showing future appointments within next 150 "days and meeting all other requirements     HPI:  Patient had recent emergency room visit in which he was having chest discomfort and CXR was performed with final read reporting no acute cardiopulmonary pathology, however patient was discharged from the emergency department with antibiotics and steroids  Patient followed up in our office at the end of last week, where his medications were refilled, and his regimen was unchanged  At that time he had completed his antibiotics  Patient returns to the office today, to discuss symptoms he feels have worsened and warrant antibiotic therapy  Patient left the office before being seen by physician, so physician called patient to discuss his concerns  Patient states he felt improvement in the immediate days following his emergency department visit, which she attributes to his antibiotic therapy  He states he is now having recurrence of discomfort he had \"in his lungs\", and he states in particular he is having pain in his lungs at night  He does not report explicit position associated with this discomfort  He also denies shortness of breath  He reports pain can be associated with coughing, and he states his coughing is typically dry  If he does produce any type of sputum, it is clear and not malodorous  He denies hemoptysis  He also denies fever, chills, congestion, myalgia, sick contacts  Vitals:    06/13/23 1121   BP: 122/54   Pulse: 79   Resp: 21   Temp: 97 8 °F (36 6 °C)   SpO2: 98%     Unable to examine patient as he left the office prior to physician evaluation, so I called him      Visit Time  Total Visit Duration: 15 minutes          "

## 2023-06-16 ENCOUNTER — APPOINTMENT (OUTPATIENT)
Dept: LAB | Facility: HOSPITAL | Age: 63
End: 2023-06-16
Payer: COMMERCIAL

## 2023-06-16 DIAGNOSIS — E78.5 DYSLIPIDEMIA: ICD-10-CM

## 2023-06-16 DIAGNOSIS — E11.00 TYPE 2 DIABETES MELLITUS WITH HYPEROSMOLARITY WITHOUT COMA, WITHOUT LONG-TERM CURRENT USE OF INSULIN (HCC): Chronic | ICD-10-CM

## 2023-06-16 DIAGNOSIS — B19.20 HEPATITIS C TEST POSITIVE: ICD-10-CM

## 2023-06-16 LAB
CHOLEST SERPL-MCNC: 190 MG/DL
HDLC SERPL-MCNC: 60 MG/DL
LDLC SERPL CALC-MCNC: 116 MG/DL (ref 0–100)
TRIGL SERPL-MCNC: 71 MG/DL

## 2023-06-16 PROCEDURE — 80061 LIPID PANEL: CPT

## 2023-06-16 PROCEDURE — 87522 HEPATITIS C REVRS TRNSCRPJ: CPT

## 2023-06-16 PROCEDURE — 83036 HEMOGLOBIN GLYCOSYLATED A1C: CPT

## 2023-06-16 PROCEDURE — 87902 NFCT AGT GNTYP ALYS HEP C: CPT

## 2023-06-16 PROCEDURE — 36415 COLL VENOUS BLD VENIPUNCTURE: CPT

## 2023-06-17 LAB
EST. AVERAGE GLUCOSE BLD GHB EST-MCNC: 120 MG/DL
HBA1C MFR BLD: 5.8 %

## 2023-06-19 ENCOUNTER — TELEPHONE (OUTPATIENT)
Dept: GASTROENTEROLOGY | Facility: CLINIC | Age: 63
End: 2023-06-19

## 2023-06-19 LAB
HCV RNA SERPL NAA+PROBE-ACNC: <15 IU/ML
TEST INFORMATION: NORMAL

## 2023-06-19 NOTE — TELEPHONE ENCOUNTER
Patients GI provider:  Madeleine Mendez    Number to return call: ( [phone]    Reason for call: Pt calling rescheduled procedures for August 10th he wanted to make sure nothing else was needed to be done prior to the procedures     Scheduled procedure/appointment date if applicable: Apt/procedure  8/10  Lacie Gaines

## 2023-06-20 DIAGNOSIS — Z95.1 HX OF CABG: Chronic | ICD-10-CM

## 2023-06-20 DIAGNOSIS — I10 HYPERTENSION: ICD-10-CM

## 2023-06-20 RX ORDER — LISINOPRIL 20 MG/1
20 TABLET ORAL DAILY
Qty: 90 TABLET | Refills: 1 | Status: SHIPPED | OUTPATIENT
Start: 2023-06-20

## 2023-06-20 NOTE — TELEPHONE ENCOUNTER
I called and spoke to patient  He is scheduled for his procedure in august and he already got blood work done  I advised him he does not need to do anything else at this time  He verbalized understanding

## 2023-06-22 DIAGNOSIS — G95.19 EDEMA, SPINAL CORD (HCC): ICD-10-CM

## 2023-06-22 LAB
HCV GENTYP SERPL NAA+PROBE: NORMAL
HCV PLEASE NOTE: NORMAL

## 2023-06-22 RX ORDER — GABAPENTIN 300 MG/1
CAPSULE ORAL
Qty: 90 CAPSULE | Refills: 1 | Status: SHIPPED | OUTPATIENT
Start: 2023-06-22

## 2023-07-10 DIAGNOSIS — Z72.0 NICOTINE ABUSE: Primary | Chronic | ICD-10-CM

## 2023-07-18 RX ORDER — VARENICLINE TARTRATE 1 MG/1
1 TABLET, FILM COATED ORAL 2 TIMES DAILY
Qty: 56 TABLET | Refills: 1 | Status: SHIPPED | OUTPATIENT
Start: 2023-07-18

## 2023-07-25 ENCOUNTER — TELEPHONE (OUTPATIENT)
Dept: GASTROENTEROLOGY | Facility: CLINIC | Age: 63
End: 2023-07-25

## 2023-07-25 NOTE — TELEPHONE ENCOUNTER
Left message reminding patient of his colon/egd on 8/10. He will need a , will be called day before with arrival time and I mailed the OTC prep instructions to him. Any questions, he may call.

## 2023-08-10 RX ORDER — SODIUM CHLORIDE, SODIUM LACTATE, POTASSIUM CHLORIDE, CALCIUM CHLORIDE 600; 310; 30; 20 MG/100ML; MG/100ML; MG/100ML; MG/100ML
50 INJECTION, SOLUTION INTRAVENOUS CONTINUOUS
Status: CANCELLED | OUTPATIENT
Start: 2023-08-10

## 2023-08-10 RX ORDER — LIDOCAINE HYDROCHLORIDE 10 MG/ML
0.5 INJECTION, SOLUTION EPIDURAL; INFILTRATION; INTRACAUDAL; PERINEURAL ONCE AS NEEDED
Status: CANCELLED | OUTPATIENT
Start: 2023-08-10

## 2023-08-16 DIAGNOSIS — I10 ESSENTIAL (PRIMARY) HYPERTENSION: Chronic | ICD-10-CM

## 2023-08-17 RX ORDER — RANOLAZINE 500 MG/1
TABLET, EXTENDED RELEASE ORAL
Qty: 60 TABLET | Refills: 3 | Status: SHIPPED | OUTPATIENT
Start: 2023-08-17

## 2023-09-01 DIAGNOSIS — I10 ESSENTIAL HYPERTENSION: ICD-10-CM

## 2023-09-01 RX ORDER — AMLODIPINE BESYLATE 5 MG/1
5 TABLET ORAL DAILY
Qty: 30 TABLET | Refills: 0 | Status: SHIPPED | OUTPATIENT
Start: 2023-09-01

## 2023-09-05 DIAGNOSIS — Z72.0 NICOTINE ABUSE: Chronic | ICD-10-CM

## 2023-09-06 RX ORDER — VARENICLINE TARTRATE 1 MG/1
1 TABLET, FILM COATED ORAL 2 TIMES DAILY
Qty: 56 TABLET | Refills: 1 | Status: SHIPPED | OUTPATIENT
Start: 2023-09-06

## 2023-09-09 DIAGNOSIS — Z85.46 HX OF PROSTATIC MALIGNANCY: ICD-10-CM

## 2023-09-15 NOTE — TELEPHONE ENCOUNTER
Patient has been out of this medication for 4 or 5 days.      Please send refill asap to [phone] [facility]

## 2023-09-17 RX ORDER — TAMSULOSIN HYDROCHLORIDE 0.4 MG/1
CAPSULE ORAL
Qty: 90 CAPSULE | Refills: 1 | Status: SHIPPED | OUTPATIENT
Start: 2023-09-17

## 2023-09-19 DIAGNOSIS — G95.19 EDEMA, SPINAL CORD (HCC): ICD-10-CM

## 2023-09-19 RX ORDER — GABAPENTIN 300 MG/1
CAPSULE ORAL
Qty: 90 CAPSULE | Refills: 1 | Status: SHIPPED | OUTPATIENT
Start: 2023-09-19

## 2023-09-20 ENCOUNTER — PATIENT OUTREACH (OUTPATIENT)
Dept: FAMILY MEDICINE CLINIC | Facility: CLINIC | Age: 63
End: 2023-09-20

## 2023-09-20 ENCOUNTER — APPOINTMENT (EMERGENCY)
Dept: RADIOLOGY | Facility: HOSPITAL | Age: 63
End: 2023-09-20
Payer: COMMERCIAL

## 2023-09-20 ENCOUNTER — HOSPITAL ENCOUNTER (INPATIENT)
Facility: HOSPITAL | Age: 63
LOS: 1 days | Discharge: HOME | End: 2023-09-22
Attending: EMERGENCY MEDICINE | Admitting: FAMILY MEDICINE
Payer: COMMERCIAL

## 2023-09-20 DIAGNOSIS — G47.30 SLEEP APNEA: ICD-10-CM

## 2023-09-20 DIAGNOSIS — I48.0 PAROXYSMAL ATRIAL FIBRILLATION WITH RAPID VENTRICULAR RESPONSE (HCC): ICD-10-CM

## 2023-09-20 DIAGNOSIS — Z95.1 HX OF CABG: Chronic | ICD-10-CM

## 2023-09-20 DIAGNOSIS — J44.1 COPD EXACERBATION (HCC): ICD-10-CM

## 2023-09-20 DIAGNOSIS — I10 HYPERTENSION: ICD-10-CM

## 2023-09-20 DIAGNOSIS — R07.9 CHEST PAIN: ICD-10-CM

## 2023-09-20 DIAGNOSIS — E11.00 TYPE 2 DIABETES MELLITUS WITH HYPEROSMOLARITY WITHOUT COMA, WITHOUT LONG-TERM CURRENT USE OF INSULIN (HCC): Chronic | ICD-10-CM

## 2023-09-20 DIAGNOSIS — K21.9 GERD (GASTROESOPHAGEAL REFLUX DISEASE): ICD-10-CM

## 2023-09-20 DIAGNOSIS — I48.0 PAROXYSMAL ATRIAL FIBRILLATION (HCC): Primary | ICD-10-CM

## 2023-09-20 LAB
2HR DELTA HS TROPONIN: -4 NG/L
ALBUMIN SERPL BCP-MCNC: 4.2 G/DL (ref 3.5–5)
ALP SERPL-CCNC: 51 U/L (ref 34–104)
ALT SERPL W P-5'-P-CCNC: 28 U/L (ref 7–52)
ANION GAP SERPL CALCULATED.3IONS-SCNC: 9 MMOL/L
APTT PPP: 28 SECONDS (ref 23–37)
AST SERPL W P-5'-P-CCNC: 35 U/L (ref 13–39)
BASOPHILS # BLD AUTO: 0.11 THOUSANDS/ÂΜL (ref 0–0.1)
BASOPHILS NFR BLD AUTO: 1 % (ref 0–1)
BILIRUB SERPL-MCNC: 0.42 MG/DL (ref 0.2–1)
BNP SERPL-MCNC: 95 PG/ML (ref 0–100)
BUN SERPL-MCNC: 22 MG/DL (ref 5–25)
CALCIUM SERPL-MCNC: 9.6 MG/DL (ref 8.4–10.2)
CARDIAC TROPONIN I PNL SERPL HS: 15 NG/L
CARDIAC TROPONIN I PNL SERPL HS: 19 NG/L
CHLORIDE SERPL-SCNC: 96 MMOL/L (ref 96–108)
CO2 SERPL-SCNC: 24 MMOL/L (ref 21–32)
CREAT SERPL-MCNC: 1.04 MG/DL (ref 0.6–1.3)
D DIMER PPP FEU-MCNC: 1 UG/ML FEU
EOSINOPHIL # BLD AUTO: 0.51 THOUSAND/ÂΜL (ref 0–0.61)
EOSINOPHIL NFR BLD AUTO: 6 % (ref 0–6)
ERYTHROCYTE [DISTWIDTH] IN BLOOD BY AUTOMATED COUNT: 13.5 % (ref 11.6–15.1)
GFR SERPL CREATININE-BSD FRML MDRD: 77 ML/MIN/1.73SQ M
GLUCOSE SERPL-MCNC: 89 MG/DL (ref 65–140)
HCT VFR BLD AUTO: 40.8 % (ref 36.5–49.3)
HGB BLD-MCNC: 14.4 G/DL (ref 12–17)
IMM GRANULOCYTES # BLD AUTO: 0.05 THOUSAND/UL (ref 0–0.2)
IMM GRANULOCYTES NFR BLD AUTO: 1 % (ref 0–2)
INR PPP: 0.94 (ref 0.84–1.19)
LYMPHOCYTES # BLD AUTO: 2 THOUSANDS/ÂΜL (ref 0.6–4.47)
LYMPHOCYTES NFR BLD AUTO: 23 % (ref 14–44)
MAGNESIUM SERPL-MCNC: 1.9 MG/DL (ref 1.9–2.7)
MCH RBC QN AUTO: 34.4 PG (ref 26.8–34.3)
MCHC RBC AUTO-ENTMCNC: 35.3 G/DL (ref 31.4–37.4)
MCV RBC AUTO: 98 FL (ref 82–98)
MONOCYTES # BLD AUTO: 1.23 THOUSAND/ÂΜL (ref 0.17–1.22)
MONOCYTES NFR BLD AUTO: 14 % (ref 4–12)
NEUTROPHILS # BLD AUTO: 4.93 THOUSANDS/ÂΜL (ref 1.85–7.62)
NEUTS SEG NFR BLD AUTO: 55 % (ref 43–75)
NRBC BLD AUTO-RTO: 0 /100 WBCS
PLATELET # BLD AUTO: 205 THOUSANDS/UL (ref 149–390)
PMV BLD AUTO: 10.5 FL (ref 8.9–12.7)
POTASSIUM SERPL-SCNC: 5.2 MMOL/L (ref 3.5–5.3)
PROT SERPL-MCNC: 7.8 G/DL (ref 6.4–8.4)
PROTHROMBIN TIME: 12.7 SECONDS (ref 11.6–14.5)
RBC # BLD AUTO: 4.18 MILLION/UL (ref 3.88–5.62)
SODIUM SERPL-SCNC: 129 MMOL/L (ref 135–147)
WBC # BLD AUTO: 8.83 THOUSAND/UL (ref 4.31–10.16)

## 2023-09-20 PROCEDURE — 83735 ASSAY OF MAGNESIUM: CPT | Performed by: EMERGENCY MEDICINE

## 2023-09-20 PROCEDURE — 36415 COLL VENOUS BLD VENIPUNCTURE: CPT | Performed by: EMERGENCY MEDICINE

## 2023-09-20 PROCEDURE — 99291 CRITICAL CARE FIRST HOUR: CPT | Performed by: EMERGENCY MEDICINE

## 2023-09-20 PROCEDURE — 85610 PROTHROMBIN TIME: CPT | Performed by: EMERGENCY MEDICINE

## 2023-09-20 PROCEDURE — 84484 ASSAY OF TROPONIN QUANT: CPT | Performed by: EMERGENCY MEDICINE

## 2023-09-20 PROCEDURE — 96374 THER/PROPH/DIAG INJ IV PUSH: CPT

## 2023-09-20 PROCEDURE — 99222 1ST HOSP IP/OBS MODERATE 55: CPT | Performed by: NURSE PRACTITIONER

## 2023-09-20 PROCEDURE — 80053 COMPREHEN METABOLIC PANEL: CPT | Performed by: EMERGENCY MEDICINE

## 2023-09-20 PROCEDURE — 99285 EMERGENCY DEPT VISIT HI MDM: CPT

## 2023-09-20 PROCEDURE — 83036 HEMOGLOBIN GLYCOSYLATED A1C: CPT | Performed by: NURSE PRACTITIONER

## 2023-09-20 PROCEDURE — 71275 CT ANGIOGRAPHY CHEST: CPT

## 2023-09-20 PROCEDURE — 85379 FIBRIN DEGRADATION QUANT: CPT | Performed by: EMERGENCY MEDICINE

## 2023-09-20 PROCEDURE — 93005 ELECTROCARDIOGRAM TRACING: CPT

## 2023-09-20 PROCEDURE — 83880 ASSAY OF NATRIURETIC PEPTIDE: CPT | Performed by: EMERGENCY MEDICINE

## 2023-09-20 PROCEDURE — 83930 ASSAY OF BLOOD OSMOLALITY: CPT | Performed by: NURSE PRACTITIONER

## 2023-09-20 PROCEDURE — 85730 THROMBOPLASTIN TIME PARTIAL: CPT | Performed by: EMERGENCY MEDICINE

## 2023-09-20 PROCEDURE — 85025 COMPLETE CBC W/AUTO DIFF WBC: CPT | Performed by: EMERGENCY MEDICINE

## 2023-09-20 PROCEDURE — G1004 CDSM NDSC: HCPCS

## 2023-09-20 PROCEDURE — 84550 ASSAY OF BLOOD/URIC ACID: CPT | Performed by: NURSE PRACTITIONER

## 2023-09-20 RX ORDER — CARVEDILOL 12.5 MG/1
12.5 TABLET ORAL ONCE
Status: COMPLETED | OUTPATIENT
Start: 2023-09-20 | End: 2023-09-20

## 2023-09-20 RX ORDER — IPRATROPIUM BROMIDE AND ALBUTEROL SULFATE 2.5; .5 MG/3ML; MG/3ML
3 SOLUTION RESPIRATORY (INHALATION)
Status: DISCONTINUED | OUTPATIENT
Start: 2023-09-20 | End: 2023-09-21

## 2023-09-20 RX ORDER — METHYLPREDNISOLONE SODIUM SUCCINATE 125 MG/2ML
100 INJECTION, POWDER, LYOPHILIZED, FOR SOLUTION INTRAMUSCULAR; INTRAVENOUS ONCE
Status: COMPLETED | OUTPATIENT
Start: 2023-09-20 | End: 2023-09-20

## 2023-09-20 RX ORDER — METOPROLOL TARTRATE 5 MG/5ML
5 INJECTION INTRAVENOUS ONCE
Status: COMPLETED | OUTPATIENT
Start: 2023-09-21 | End: 2023-09-20

## 2023-09-20 RX ADMIN — METOPROLOL TARTRATE 5 MG: 5 INJECTION INTRAVENOUS at 23:58

## 2023-09-20 RX ADMIN — IOHEXOL 85 ML: 350 INJECTION, SOLUTION INTRAVENOUS at 22:11

## 2023-09-20 RX ADMIN — IPRATROPIUM BROMIDE AND ALBUTEROL SULFATE 3 ML: .5; 3 SOLUTION RESPIRATORY (INHALATION) at 21:22

## 2023-09-20 RX ADMIN — CARVEDILOL 12.5 MG: 12.5 TABLET, FILM COATED ORAL at 21:03

## 2023-09-20 RX ADMIN — METHYLPREDNISOLONE SODIUM SUCCINATE 100 MG: 125 INJECTION, POWDER, FOR SOLUTION INTRAMUSCULAR; INTRAVENOUS at 21:21

## 2023-09-20 NOTE — PROGRESS NOTES
Pt due for outreach. LVM requesting return call if he has any needs. RN CM contact information provided. Chart review done. Pts last utilization was in June when he went to the ED for pneumonia. Pt was a no show for cardiology appointment of 8/17. Pt cancelled endoscopy on 8/10. Pt does not have any follow up appointments scheduled at [facility]. RN CM will close episode at this time. Will reopen if new referral is received or if there is outreach from patient.

## 2023-09-21 PROBLEM — I48.0 PAROXYSMAL ATRIAL FIBRILLATION WITH RAPID VENTRICULAR RESPONSE (HCC): Status: ACTIVE | Noted: 2023-09-21

## 2023-09-21 PROBLEM — R07.9 CHEST PAIN: Status: ACTIVE | Noted: 2023-09-21

## 2023-09-21 PROBLEM — R79.89 ELEVATED D-DIMER: Status: RESOLVED | Noted: 2023-09-21 | Resolved: 2023-09-21

## 2023-09-21 PROBLEM — K21.9 GERD (GASTROESOPHAGEAL REFLUX DISEASE): Status: ACTIVE | Noted: 2023-09-21

## 2023-09-21 PROBLEM — G47.30 SLEEP APNEA: Status: ACTIVE | Noted: 2023-09-21

## 2023-09-21 PROBLEM — R79.89 ELEVATED D-DIMER: Status: ACTIVE | Noted: 2023-09-21

## 2023-09-21 LAB
ANION GAP SERPL CALCULATED.3IONS-SCNC: 5 MMOL/L
ATRIAL RATE: 142 BPM
ATRIAL RATE: 146 BPM
ATRIAL RATE: 150 BPM
ATRIAL RATE: 89 BPM
ATRIAL RATE: 90 BPM
ATRIAL RATE: 91 BPM
ATRIAL RATE: 91 BPM
ATRIAL RATE: 93 BPM
BILIRUB UR QL STRIP: NEGATIVE
BUN SERPL-MCNC: 21 MG/DL (ref 5–25)
CALCIUM SERPL-MCNC: 9.3 MG/DL (ref 8.4–10.2)
CARDIAC TROPONIN I PNL SERPL HS: 12 NG/L (ref 8–18)
CHLORIDE SERPL-SCNC: 101 MMOL/L (ref 96–108)
CLARITY UR: CLEAR
CO2 SERPL-SCNC: 25 MMOL/L (ref 21–32)
COLOR UR: YELLOW
CREAT SERPL-MCNC: 1.07 MG/DL (ref 0.6–1.3)
EST. AVERAGE GLUCOSE BLD GHB EST-MCNC: 128 MG/DL
GFR SERPL CREATININE-BSD FRML MDRD: 74 ML/MIN/1.73SQ M
GLUCOSE P FAST SERPL-MCNC: 133 MG/DL (ref 65–99)
GLUCOSE SERPL-MCNC: 133 MG/DL (ref 65–140)
GLUCOSE SERPL-MCNC: 140 MG/DL (ref 65–140)
GLUCOSE SERPL-MCNC: 170 MG/DL (ref 65–140)
GLUCOSE SERPL-MCNC: 194 MG/DL (ref 65–140)
GLUCOSE SERPL-MCNC: 238 MG/DL (ref 65–140)
GLUCOSE UR STRIP-MCNC: NEGATIVE MG/DL
HBA1C MFR BLD: 6.1 %
HGB UR QL STRIP.AUTO: NEGATIVE
KETONES UR STRIP-MCNC: NEGATIVE MG/DL
LEUKOCYTE ESTERASE UR QL STRIP: NEGATIVE
MAGNESIUM SERPL-MCNC: 2 MG/DL (ref 1.9–2.7)
NITRITE UR QL STRIP: NEGATIVE
OSMOLALITY UR/SERPL-RTO: 277 MMOL/KG (ref 282–298)
OSMOLALITY UR: 173 MMOL/KG
P AXIS: 112 DEGREES
P AXIS: 118 DEGREES
P AXIS: 69 DEGREES
P AXIS: 72 DEGREES
P AXIS: 74 DEGREES
P AXIS: 74 DEGREES
P AXIS: 78 DEGREES
PH UR STRIP.AUTO: 6.5 [PH]
POTASSIUM SERPL-SCNC: 4.8 MMOL/L (ref 3.5–5.3)
PR INTERVAL: 120 MS
PR INTERVAL: 122 MS
PR INTERVAL: 126 MS
PR INTERVAL: 128 MS
PR INTERVAL: 128 MS
PR INTERVAL: 132 MS
PROT UR STRIP-MCNC: NEGATIVE MG/DL
QRS AXIS: 76 DEGREES
QRS AXIS: 82 DEGREES
QRS AXIS: 84 DEGREES
QRS AXIS: 85 DEGREES
QRS AXIS: 86 DEGREES
QRS AXIS: 87 DEGREES
QRSD INTERVAL: 84 MS
QRSD INTERVAL: 86 MS
QRSD INTERVAL: 88 MS
QRSD INTERVAL: 92 MS
QT INTERVAL: 258 MS
QT INTERVAL: 288 MS
QT INTERVAL: 344 MS
QT INTERVAL: 358 MS
QT INTERVAL: 364 MS
QT INTERVAL: 368 MS
QT INTERVAL: 372 MS
QT INTERVAL: 406 MS
QTC INTERVAL: 407 MS
QTC INTERVAL: 440 MS
QTC INTERVAL: 443 MS
QTC INTERVAL: 447 MS
QTC INTERVAL: 447 MS
QTC INTERVAL: 455 MS
QTC INTERVAL: 504 MS
QTC INTERVAL: 536 MS
SARS-COV-2 RNA RESP QL NAA+PROBE: NEGATIVE
SODIUM 24H UR-SCNC: 28 MOL/L
SODIUM SERPL-SCNC: 131 MMOL/L (ref 135–147)
SP GR UR STRIP.AUTO: <=1.005 (ref 1–1.03)
T WAVE AXIS: -39 DEGREES
T WAVE AXIS: -68 DEGREES
T WAVE AXIS: 63 DEGREES
T WAVE AXIS: 65 DEGREES
T WAVE AXIS: 68 DEGREES
T WAVE AXIS: 74 DEGREES
T WAVE AXIS: 77 DEGREES
T WAVE AXIS: 78 DEGREES
T4 FREE SERPL-MCNC: 0.72 NG/DL (ref 0.61–1.12)
TSH SERPL DL<=0.05 MIU/L-ACNC: 0.29 UIU/ML (ref 0.45–4.5)
URATE SERPL-MCNC: 5.2 MG/DL (ref 3.5–8.5)
UROBILINOGEN UR QL STRIP.AUTO: 0.2 E.U./DL
VENTRICULAR RATE: 142 BPM
VENTRICULAR RATE: 146 BPM
VENTRICULAR RATE: 150 BPM
VENTRICULAR RATE: 89 BPM
VENTRICULAR RATE: 90 BPM
VENTRICULAR RATE: 91 BPM
VENTRICULAR RATE: 91 BPM
VENTRICULAR RATE: 93 BPM

## 2023-09-21 PROCEDURE — 83935 ASSAY OF URINE OSMOLALITY: CPT | Performed by: NURSE PRACTITIONER

## 2023-09-21 PROCEDURE — 83735 ASSAY OF MAGNESIUM: CPT | Performed by: NURSE PRACTITIONER

## 2023-09-21 PROCEDURE — 84484 ASSAY OF TROPONIN QUANT: CPT | Performed by: NURSE PRACTITIONER

## 2023-09-21 PROCEDURE — 81003 URINALYSIS AUTO W/O SCOPE: CPT | Performed by: EMERGENCY MEDICINE

## 2023-09-21 PROCEDURE — 94760 N-INVAS EAR/PLS OXIMETRY 1: CPT

## 2023-09-21 PROCEDURE — 82948 REAGENT STRIP/BLOOD GLUCOSE: CPT

## 2023-09-21 PROCEDURE — 99222 1ST HOSP IP/OBS MODERATE 55: CPT | Performed by: INTERNAL MEDICINE

## 2023-09-21 PROCEDURE — 93010 ELECTROCARDIOGRAM REPORT: CPT | Performed by: INTERNAL MEDICINE

## 2023-09-21 PROCEDURE — 80048 BASIC METABOLIC PNL TOTAL CA: CPT | Performed by: NURSE PRACTITIONER

## 2023-09-21 PROCEDURE — 99232 SBSQ HOSP IP/OBS MODERATE 35: CPT | Performed by: FAMILY MEDICINE

## 2023-09-21 PROCEDURE — 87635 SARS-COV-2 COVID-19 AMP PRB: CPT | Performed by: EMERGENCY MEDICINE

## 2023-09-21 PROCEDURE — 84439 ASSAY OF FREE THYROXINE: CPT | Performed by: NURSE PRACTITIONER

## 2023-09-21 PROCEDURE — 84443 ASSAY THYROID STIM HORMONE: CPT | Performed by: NURSE PRACTITIONER

## 2023-09-21 PROCEDURE — 93005 ELECTROCARDIOGRAM TRACING: CPT

## 2023-09-21 PROCEDURE — 84300 ASSAY OF URINE SODIUM: CPT | Performed by: NURSE PRACTITIONER

## 2023-09-21 RX ORDER — LISINOPRIL 10 MG/1
10 TABLET ORAL DAILY
Status: DISCONTINUED | OUTPATIENT
Start: 2023-09-21 | End: 2023-09-22 | Stop reason: HOSPADM

## 2023-09-21 RX ORDER — CARVEDILOL 12.5 MG/1
12.5 TABLET ORAL 2 TIMES DAILY WITH MEALS
Status: DISCONTINUED | OUTPATIENT
Start: 2023-09-21 | End: 2023-09-21

## 2023-09-21 RX ORDER — FUROSEMIDE 10 MG/ML
20 INJECTION INTRAMUSCULAR; INTRAVENOUS ONCE
Status: COMPLETED | OUTPATIENT
Start: 2023-09-21 | End: 2023-09-21

## 2023-09-21 RX ORDER — MAGNESIUM SULFATE 1 G/100ML
1 INJECTION INTRAVENOUS ONCE
Status: COMPLETED | OUTPATIENT
Start: 2023-09-21 | End: 2023-09-21

## 2023-09-21 RX ORDER — TAMSULOSIN HYDROCHLORIDE 0.4 MG/1
0.4 CAPSULE ORAL
Status: DISCONTINUED | OUTPATIENT
Start: 2023-09-21 | End: 2023-09-22 | Stop reason: HOSPADM

## 2023-09-21 RX ORDER — AMLODIPINE BESYLATE 5 MG/1
5 TABLET ORAL DAILY
Status: DISCONTINUED | OUTPATIENT
Start: 2023-09-21 | End: 2023-09-21

## 2023-09-21 RX ORDER — PRAVASTATIN SODIUM 40 MG
40 TABLET ORAL
Status: DISCONTINUED | OUTPATIENT
Start: 2023-09-21 | End: 2023-09-22 | Stop reason: HOSPADM

## 2023-09-21 RX ORDER — LANOLIN ALCOHOL/MO/W.PET/CERES
400 CREAM (GRAM) TOPICAL 2 TIMES DAILY
Status: DISCONTINUED | OUTPATIENT
Start: 2023-09-21 | End: 2023-09-22 | Stop reason: HOSPADM

## 2023-09-21 RX ORDER — PANTOPRAZOLE SODIUM 40 MG/1
40 TABLET, DELAYED RELEASE ORAL
Status: DISCONTINUED | OUTPATIENT
Start: 2023-09-21 | End: 2023-09-22 | Stop reason: HOSPADM

## 2023-09-21 RX ORDER — GABAPENTIN 300 MG/1
300 CAPSULE ORAL 3 TIMES DAILY
Status: DISCONTINUED | OUTPATIENT
Start: 2023-09-21 | End: 2023-09-22 | Stop reason: HOSPADM

## 2023-09-21 RX ORDER — ACETAMINOPHEN 325 MG/1
650 TABLET ORAL EVERY 6 HOURS PRN
Status: DISCONTINUED | OUTPATIENT
Start: 2023-09-21 | End: 2023-09-22 | Stop reason: HOSPADM

## 2023-09-21 RX ORDER — SODIUM CHLORIDE 9 MG/ML
50 INJECTION, SOLUTION INTRAVENOUS CONTINUOUS
Status: DISCONTINUED | OUTPATIENT
Start: 2023-09-21 | End: 2023-09-21

## 2023-09-21 RX ORDER — LISINOPRIL 20 MG/1
20 TABLET ORAL DAILY
Status: DISCONTINUED | OUTPATIENT
Start: 2023-09-21 | End: 2023-09-21

## 2023-09-21 RX ORDER — INSULIN LISPRO 100 [IU]/ML
1-5 INJECTION, SOLUTION INTRAVENOUS; SUBCUTANEOUS EVERY 6 HOURS SCHEDULED
Status: DISCONTINUED | OUTPATIENT
Start: 2023-09-21 | End: 2023-09-22 | Stop reason: HOSPADM

## 2023-09-21 RX ORDER — FERROUS SULFATE 325(65) MG
325 TABLET ORAL
Status: DISCONTINUED | OUTPATIENT
Start: 2023-09-21 | End: 2023-09-22 | Stop reason: HOSPADM

## 2023-09-21 RX ORDER — ONDANSETRON 2 MG/ML
4 INJECTION INTRAMUSCULAR; INTRAVENOUS EVERY 6 HOURS PRN
Status: DISCONTINUED | OUTPATIENT
Start: 2023-09-21 | End: 2023-09-22 | Stop reason: HOSPADM

## 2023-09-21 RX ORDER — FOLIC ACID 1 MG/1
1000 TABLET ORAL DAILY
Status: DISCONTINUED | OUTPATIENT
Start: 2023-09-21 | End: 2023-09-22 | Stop reason: HOSPADM

## 2023-09-21 RX ORDER — RANOLAZINE 500 MG/1
500 TABLET, EXTENDED RELEASE ORAL EVERY 12 HOURS SCHEDULED
Status: DISCONTINUED | OUTPATIENT
Start: 2023-09-21 | End: 2023-09-22 | Stop reason: HOSPADM

## 2023-09-21 RX ORDER — MAGNESIUM HYDROXIDE/ALUMINUM HYDROXICE/SIMETHICONE 120; 1200; 1200 MG/30ML; MG/30ML; MG/30ML
30 SUSPENSION ORAL EVERY 4 HOURS PRN
Status: DISCONTINUED | OUTPATIENT
Start: 2023-09-21 | End: 2023-09-22 | Stop reason: HOSPADM

## 2023-09-21 RX ORDER — NALTREXONE HYDROCHLORIDE 50 MG/1
50 TABLET, FILM COATED ORAL DAILY
Status: DISCONTINUED | OUTPATIENT
Start: 2023-09-21 | End: 2023-09-22 | Stop reason: HOSPADM

## 2023-09-21 RX ORDER — FAMOTIDINE 10 MG/ML
20 INJECTION, SOLUTION INTRAVENOUS ONCE
Status: COMPLETED | OUTPATIENT
Start: 2023-09-21 | End: 2023-09-21

## 2023-09-21 RX ORDER — ALPRAZOLAM 0.25 MG/1
0.25 TABLET ORAL ONCE
Status: COMPLETED | OUTPATIENT
Start: 2023-09-21 | End: 2023-09-21

## 2023-09-21 RX ORDER — DILTIAZEM HYDROCHLORIDE 5 MG/ML
15 INJECTION INTRAVENOUS ONCE
Status: COMPLETED | OUTPATIENT
Start: 2023-09-21 | End: 2023-09-21

## 2023-09-21 RX ORDER — VARENICLINE TARTRATE 1 MG/1
1 TABLET, FILM COATED ORAL 2 TIMES DAILY
Status: DISCONTINUED | OUTPATIENT
Start: 2023-09-21 | End: 2023-09-22 | Stop reason: HOSPADM

## 2023-09-21 RX ORDER — FLUTICASONE FUROATE AND VILANTEROL 200; 25 UG/1; UG/1
1 POWDER RESPIRATORY (INHALATION) DAILY
Status: DISCONTINUED | OUTPATIENT
Start: 2023-09-21 | End: 2023-09-22 | Stop reason: HOSPADM

## 2023-09-21 RX ORDER — NICOTINE 21 MG/24HR
1 PATCH, TRANSDERMAL 24 HOURS TRANSDERMAL DAILY
Status: DISCONTINUED | OUTPATIENT
Start: 2023-09-21 | End: 2023-09-22 | Stop reason: HOSPADM

## 2023-09-21 RX ORDER — LANOLIN ALCOHOL/MO/W.PET/CERES
3 CREAM (GRAM) TOPICAL
Status: DISCONTINUED | OUTPATIENT
Start: 2023-09-21 | End: 2023-09-22 | Stop reason: HOSPADM

## 2023-09-21 RX ORDER — ENOXAPARIN SODIUM 100 MG/ML
40 INJECTION SUBCUTANEOUS
Status: DISCONTINUED | OUTPATIENT
Start: 2023-09-21 | End: 2023-09-21

## 2023-09-21 RX ORDER — LANOLIN ALCOHOL/MO/W.PET/CERES
100 CREAM (GRAM) TOPICAL DAILY
Status: DISCONTINUED | OUTPATIENT
Start: 2023-09-21 | End: 2023-09-22 | Stop reason: HOSPADM

## 2023-09-21 RX ORDER — LEVALBUTEROL INHALATION SOLUTION 0.63 MG/3ML
0.63 SOLUTION RESPIRATORY (INHALATION) EVERY 4 HOURS PRN
Status: DISCONTINUED | OUTPATIENT
Start: 2023-09-21 | End: 2023-09-22 | Stop reason: HOSPADM

## 2023-09-21 RX ORDER — ASPIRIN 81 MG/1
81 TABLET, CHEWABLE ORAL DAILY
Status: DISCONTINUED | OUTPATIENT
Start: 2023-09-21 | End: 2023-09-21

## 2023-09-21 RX ADMIN — INSULIN LISPRO 2 UNITS: 100 INJECTION, SOLUTION INTRAVENOUS; SUBCUTANEOUS at 11:44

## 2023-09-21 RX ADMIN — DILTIAZEM HYDROCHLORIDE 15 MG: 5 INJECTION INTRAVENOUS at 03:54

## 2023-09-21 RX ADMIN — NALTREXONE HYDROCHLORIDE 50 MG: 50 TABLET, FILM COATED ORAL at 08:20

## 2023-09-21 RX ADMIN — ACETAMINOPHEN 650 MG: 325 TABLET ORAL at 03:04

## 2023-09-21 RX ADMIN — RANOLAZINE 500 MG: 500 TABLET, EXTENDED RELEASE ORAL at 10:05

## 2023-09-21 RX ADMIN — GABAPENTIN 300 MG: 300 CAPSULE ORAL at 15:48

## 2023-09-21 RX ADMIN — GABAPENTIN 300 MG: 300 CAPSULE ORAL at 20:01

## 2023-09-21 RX ADMIN — MAGNESIUM SULFATE HEPTAHYDRATE 1 G: 1 INJECTION, SOLUTION INTRAVENOUS at 02:29

## 2023-09-21 RX ADMIN — NICOTINE 1 PATCH: 21 PATCH, EXTENDED RELEASE TRANSDERMAL at 08:05

## 2023-09-21 RX ADMIN — APIXABAN 5 MG: 5 TABLET, FILM COATED ORAL at 17:25

## 2023-09-21 RX ADMIN — PANTOPRAZOLE SODIUM 40 MG: 40 TABLET, DELAYED RELEASE ORAL at 05:53

## 2023-09-21 RX ADMIN — FERROUS SULFATE TAB 325 MG (65 MG ELEMENTAL FE) 325 MG: 325 (65 FE) TAB at 08:01

## 2023-09-21 RX ADMIN — THIAMINE HCL TAB 100 MG 100 MG: 100 TAB at 08:14

## 2023-09-21 RX ADMIN — ALPRAZOLAM 0.25 MG: 0.25 TABLET ORAL at 12:39

## 2023-09-21 RX ADMIN — MORPHINE SULFATE 2 MG: 2 INJECTION, SOLUTION INTRAMUSCULAR; INTRAVENOUS at 20:58

## 2023-09-21 RX ADMIN — FLUTICASONE FUROATE AND VILANTEROL TRIFENATATE 1 PUFF: 200; 25 POWDER RESPIRATORY (INHALATION) at 08:21

## 2023-09-21 RX ADMIN — ALUMINUM HYDROXIDE, MAGNESIUM HYDROXIDE, AND DIMETHICONE 30 ML: 200; 20; 200 SUSPENSION ORAL at 03:04

## 2023-09-21 RX ADMIN — Medication 12.5 MG: at 20:01

## 2023-09-21 RX ADMIN — MORPHINE SULFATE 2 MG: 2 INJECTION, SOLUTION INTRAMUSCULAR; INTRAVENOUS at 08:29

## 2023-09-21 RX ADMIN — CARVEDILOL 12.5 MG: 12.5 TABLET, FILM COATED ORAL at 08:01

## 2023-09-21 RX ADMIN — FUROSEMIDE 20 MG: 10 INJECTION, SOLUTION INTRAMUSCULAR; INTRAVENOUS at 17:25

## 2023-09-21 RX ADMIN — TAMSULOSIN HYDROCHLORIDE 0.4 MG: 0.4 CAPSULE ORAL at 15:48

## 2023-09-21 RX ADMIN — MORPHINE SULFATE 2 MG: 2 INJECTION, SOLUTION INTRAMUSCULAR; INTRAVENOUS at 18:11

## 2023-09-21 RX ADMIN — RANOLAZINE 500 MG: 500 TABLET, EXTENDED RELEASE ORAL at 02:29

## 2023-09-21 RX ADMIN — FOLIC ACID 1000 MCG: 1 TABLET ORAL at 08:13

## 2023-09-21 RX ADMIN — PRAVASTATIN SODIUM 40 MG: 40 TABLET ORAL at 15:48

## 2023-09-21 RX ADMIN — SODIUM CHLORIDE 50 ML/HR: 0.9 INJECTION, SOLUTION INTRAVENOUS at 02:29

## 2023-09-21 RX ADMIN — RANOLAZINE 500 MG: 500 TABLET, EXTENDED RELEASE ORAL at 20:01

## 2023-09-21 RX ADMIN — FAMOTIDINE 20 MG: 10 INJECTION, SOLUTION INTRAVENOUS at 02:29

## 2023-09-21 RX ADMIN — MORPHINE SULFATE 2 MG: 2 INJECTION, SOLUTION INTRAMUSCULAR; INTRAVENOUS at 04:12

## 2023-09-21 RX ADMIN — MORPHINE SULFATE 2 MG: 2 INJECTION, SOLUTION INTRAMUSCULAR; INTRAVENOUS at 14:06

## 2023-09-21 RX ADMIN — ASPIRIN 81 MG: 81 TABLET, COATED ORAL at 08:12

## 2023-09-21 RX ADMIN — ENOXAPARIN SODIUM 40 MG: 40 INJECTION SUBCUTANEOUS at 14:06

## 2023-09-21 RX ADMIN — Medication 400 MG: at 17:25

## 2023-09-21 NOTE — ASSESSMENT & PLAN NOTE
· Patient was wheezing on ED arriva  · Received IV Solu-Medrol  · No wheezing on auscultation  · Not taking Breo regularly at home. Will order daily.   · Xopenex as needed  · Hold off on steroids

## 2023-09-21 NOTE — ASSESSMENT & PLAN NOTE
Patient presents with localized intermittent chest pain since Saturday, reports chest pain worse when laying down at night, with associated upset stomach. Patient denies SOB to me. · History of CAD, status post triple bypass in 2004, PCI with stents x2 in 2014. · History of paroxysmal A-fib briefly postoperatively   · History of GERD, on PPI  · Nuclear stress test in November last year was negative  · 2D echo in November last year showed EF 98%, grade 1 diastolic dysfunction, no significant valve disease  · EKG in ED showed uncontrolled A-fib versus sinus tach, T wave inversion in III and aVF  · Patient was in normal sinus rhythm and tachycardia in ED.   · Troponins unremarkable  · Cardiac chest pain versus GI source of pain  · Trend troponin  · Telemetry  · N.p.o. for midnight  · Consult GI and cardiology

## 2023-09-21 NOTE — ASSESSMENT & PLAN NOTE
· On Norvasc Coreg, lisinopril at home  · Hold Norvasc  · Continue Coreg  · Decrease lisinopril to 10 mg p.o. daily.   · BP labile  · Monitor

## 2023-09-21 NOTE — UTILIZATION REVIEW
Initial Clinical Review    ED provider saw pt 9/20 2006      OBS order 9/20 2347 converted to IP On 9/21 1550 for continued cardiology workup     Admission: Date/Time/Statement:   Admission Orders (From admission, onward)     Ordered        09/21/23 1550  Inpatient Admission  Once            09/20/23 2347  Place in Observation  Once                       Inpatient Admission     Standing Status:   Standing     Number of Occurrences:   1     Order Specific Question:   Level of Care     Answer:   Med Surg [16]     Order Specific Question:   Estimated length of stay     Answer:   More than 2 Midnights     Order Specific Question:   Certification     Answer:   I certify that inpatient services are medically necessary for this patient for a duration of greater than two midnights. See H&P and MD Progress Notes for additional information about the patient's course of treatment. ED Arrival Information     Expected   -    Arrival   9/20/2023 19:52    Acuity   Emergent            Means of arrival   Walk-In    Escorted by   Self    Service   Hospitalist    Admission type   Emergency            Arrival complaint   sob/chest pain           Chief Complaint   Patient presents with   • Shortness of Breath     Patient c/o sob and diffuse cp since Saturday, feels worse when laying down       Initial Presentation: 66 y.o. male to ED from home w/   localized intermittent chest pain since Saturday, reports chest pain worse when laying down at night, with associated upset stomach. PMH of CAD, paroxysmal A-fib, hypertension, COPD, alcohol abuse, nicotine abuse, type 2 diabetes, prostate cancer, hyponatremia. Found to have Na 129, elevated d-dimer 1.0, afib w/ RVR . Admitted OBS status w/ CP , plan for serial trop , tele , NPO , consult GI and cardiology . afib cont coreg , optimize lytes . CTA chest neg for PE . Na 129 Check urine sodium, urine osmole, serum osmole, uric acid, TSH, free T4, gentle IVF and repeat labs in am . GERD PPI. CAD asa, coreg , ranexa , statin . HTN hold norvasc, cont coreg , dec lisinopril and monitor . DM SSI and monitor . COPD xoponex prn .     9/21 Cardiology Consult   Was found to be in atrial flutter with RVR. Was given Cardizem. Monitor lytes , tele , replete lytes . 9/21 GI Consult   CP , GERD cont pantoprazole , diet as suman . EGD as OP . Ok to DC from GI standpoint . 9/21 IM Note   Later in the afternoon noted to be tachycardic on the monitor and reported some chest pressure and feeling anxious. Cont tele and monitoring . Coreg changed to lopressor . Cont lisinopril at a lower dose. Started on eliquis . Date: 9/22    Day 3: Has surpassed a 2nd midnight with active treatments and services, which include monitoring of tele , tachycardia .  F/u echo         ED Triage Vitals   Temperature Pulse Respirations Blood Pressure SpO2   09/20/23 1957 09/20/23 2000 09/20/23 2000 09/20/23 2000 09/20/23 2000   (!) 97.1 °F (36.2 °C) (!) 139 18 131/89 95 %      Temp Source Heart Rate Source Patient Position - Orthostatic VS BP Location FiO2 (%)   09/20/23 1957 09/21/23 0207 09/21/23 0207 09/20/23 2000 --   Oral Monitor Lying Right arm       Pain Score       09/21/23 0222       5          Wt Readings from Last 1 Encounters:   09/21/23 86.6 kg (191 lb)     Additional Vital Signs:   09/21/23 04:09:44 -- 83 17 121/63 82 93 % -- Lying   09/21/23 0354 -- 145 Abnormal  -- -- -- 96 % -- --   09/21/23 02:07:20 98.1 °F (36.7 °C) 74 18 133/64 87 96 % None (Room air) Lying   09/21/23 0130 -- 74 20 103/58 77 95 % -- --   09/21/23 0100 -- 72 22 99/59 74 95 % -- --   09/21/23 0045 -- 76 22 115/65 85 97 % -- --   09/21/23 0030 -- 73 20 112/59 79 95 % -- --   09/21/23 0015 -- 75 21 126/65 92 96 % -- --   09/21/23 0000 -- 135 Abnormal  19 153/92 115 95 % -- --   09/20/23 2230 -- 82 20 -- -- 97 % -- --   09/20/23 2130 -- 134 Abnormal  19 146/91 112 100 % -- --   09/20/23 2115 -- 69 22 -- -- 97 % -- --   09/20/23 2100 -- 85 22 164/97 123 95 % -- --   09/20/23 2045 -- 71 17 -- -- 96 % -- --   09/20/23 2030 -- 136 Abnormal  15 129/91 106 97 % -- --   09/20/23 2000 -- 139 Abnormal   18 131/89 -- 95 %  None (Room air) --   Pulse: Simultaneous filing. User may not have seen previous data. at 09/20/23 2000   SpO2: Simultaneous filing. User may not have seen previous data. at 09/20/23 2000       Pertinent Labs/Diagnostic Test Results:   9/21 echo   9/20 EKG   Interpretation: abnormal     Rate:     ECG rate:  142     ECG rate assessment: tachycardic     Rhythm:     Rhythm: atrial fibrillation     QRS:     QRS axis:  Normal   Conduction:     Conduction: normal     ST segments:     ST segments:  Non-specific   T waves:     T waves: inverted       Inverted:  III and aVF  CTA ED chest PE study   Final Result by Patrick Cotton MD (09/20 2328)         1. No evidence for acute pulmonary embolism or other acute intrathoracic abnormality. 2. Additional findings as noted.                   Workstation performed: [id number]           Results from last 7 days   Lab Units 09/21/23  0001   SARS-COV-2  Negative     Results from last 7 days   Lab Units 09/20/23 2013   WBC Thousand/uL 8.83   HEMOGLOBIN g/dL 14.4   HEMATOCRIT % 40.8   PLATELETS Thousands/uL 205   NEUTROS ABS Thousands/µL 4.93         Results from last 7 days   Lab Units 09/21/23  0844 09/20/23 2013   SODIUM mmol/L 131* 129*   POTASSIUM mmol/L 4.8 5.2   CHLORIDE mmol/L 101 96   CO2 mmol/L 25 24   ANION GAP mmol/L 5 9   BUN mg/dL 21 22   CREATININE mg/dL 1.07 1.04   EGFR ml/min/1.73sq m 74 77   CALCIUM mg/dL 9.3 9.6   MAGNESIUM mg/dL 2.0 1.9     Results from last 7 days   Lab Units 09/20/23 2013   AST U/L 35   ALT U/L 28   ALK PHOS U/L 51   TOTAL PROTEIN g/dL 7.8   ALBUMIN g/dL 4.2   TOTAL BILIRUBIN mg/dL 0.42     Results from last 7 days   Lab Units 09/21/23  1612 09/21/23  1126 09/21/23  0719 09/21/23  0233   POC GLUCOSE mg/dl 140 238* 170* 194*     Results from last 7 days   Lab Units 09/21/23  0844 09/20/23 2013   GLUCOSE RANDOM mg/dL 133 89       Results from last 7 days   Lab Units 09/20/23  2226 09/20/23 2013   HS TNI 0HR ng/L  --  19   HS TNI 2HR ng/L 15  --    HSTNI D2 ng/L -4  --      Results from last 7 days   Lab Units 09/20/23 2013   D-DIMER QUANTITATIVE ug/ml FEU 1.00*     Results from last 7 days   Lab Units 09/20/23 2013   PROTIME seconds 12.7   INR  0.94   PTT seconds 28     Results from last 7 days   Lab Units 09/20/23 2013   BNP pg/mL 95     Results from last 7 days   Lab Units 09/21/23  0001   CLARITY UA  Clear   COLOR UA  Yellow   SPEC GRAV UA  <=1.005   PH UA  6.5   GLUCOSE UA mg/dl Negative   KETONES UA mg/dl Negative   BLOOD UA  Negative   PROTEIN UA mg/dl Negative   NITRITE UA  Negative   BILIRUBIN UA  Negative   UROBILINOGEN UA E.U./dl 0.2   LEUKOCYTES UA  Negative   SODIUM UR  28     ED Treatment:   Medication Administration from 09/20/2023 1951 to 09/21/2023 0201       Date/Time Order Dose Route Action     09/20/2023 2103 EDT carvedilol (COREG) tablet 12.5 mg 12.5 mg Oral Given     09/20/2023 2122 EDT ipratropium-albuterol (DUO-NEB) 0.5-2.5 mg/3 mL inhalation solution 3 mL 3 mL Nebulization Given     09/20/2023 2121 EDT methylPREDNISolone sodium succinate (Solu-MEDROL) injection 100 mg 100 mg Intravenous Given     09/20/2023 2358 EDT metoprolol (LOPRESSOR) injection 5 mg 5 mg Intravenous Given        Past Medical History:   Diagnosis Date   • Acute on chronic kidney failure (HCC)    • Alcohol withdrawal ([address]) 06/07/2019   • Atrial fibrillation (HCC)    • Cancer ([address])     prostate ca,had radiation   • Cardiac disease     stents,then triple bypass   • COPD (chronic obstructive pulmonary disease) ([address])    • ETOH abuse    • Hx of heart artery stent     2014   • Hyperlipidemia    • Hypertension    • Hypovolemic shock ([address]) 12/22/2019   • Lumbar spondylitis ([address]) 10/13/2022   • Nasal bone fracture 10/10/2022   • Prostate CA ([address])    • S/P CABG x 3     2004   • Sleep apnea Present on Admission:  • Type 2 diabetes mellitus (HCC)  • Nicotine abuse  • Hyponatremia  • CAD (coronary artery disease)  • Alcohol abuse  • COPD (chronic obstructive pulmonary disease) ([facility])  • Essential hypertension      Admitting Diagnosis: Shortness of breath [R06.02]  Paroxysmal atrial fibrillation ([facility]) [I48.0]  GERD (gastroesophageal reflux disease) [K21.9]  Chest pain [R07.9]  COPD exacerbation ([facility]) [J44.1]  Paroxysmal atrial fibrillation with rapid ventricular response ([facility]) [I48.0]  Age/Sex: 66 y.o. male  Admission Orders:  Scheduled Medications:  apixaban, 5 mg, Oral, BID  aspirin, 81 mg, Oral, Daily  ferrous sulfate, 325 mg, Oral, Daily With Breakfast  fluticasone-vilanterol, 1 puff, Inhalation, Daily  folic acid, 5,775 mcg, Oral, Daily  furosemide, 20 mg, Intravenous, Once  gabapentin, 300 mg, Oral, TID  insulin lispro, 1-5 Units, Subcutaneous, Q6H SEDA  lisinopril, 10 mg, Oral, Daily  magnesium Oxide, 400 mg, Oral, BID  melatonin, 3 mg, Oral, HS  metoprolol tartrate, 12.5 mg, Oral, Q12H SEDA  naltrexone, 50 mg, Oral, Daily  nicotine, 1 patch, Transdermal, Daily  pantoprazole, 40 mg, Oral, Early Morning  pravastatin, 40 mg, Oral, Daily With Dinner  ranolazine, 500 mg, Oral, Q12H SEDA  tamsulosin, 0.4 mg, Oral, Daily With Dinner  vitamin B-1, 100 mg, Oral, Daily  varenicline, 1 mg, Oral, BID      Continuous IV Infusions:  sodium chloride, 50 mL/hr, Intravenous, Continuous      PRN Meds:  acetaminophen, 650 mg, Oral, Q6H PRN  aluminum-magnesium hydroxide-simethicone, 30 mL, Oral, Q4H PRN  morphine injection, 2 mg, Intravenous, Q4H PRN  ondansetron, 4 mg, Intravenous, Q6H PRN        IP CONSULT TO GASTROENTEROLOGY  IP CONSULT TO CARDIOLOGY    Network Utilization Review Department  ATTENTION: Please call with any questions or concerns to [phone] and carefully listen to the prompts so that you are directed to the right person.  All voicemails are confidential.  Yumiko Chowdhury all requests for admission clinical reviews, approved or denied determinations and any other requests to dedicated fax number below belonging to the campus where the patient is receiving treatment.  List of dedicated fax numbers for the Facilities:  [place] DENIALS (Administrative/Medical Necessity) [phone] [address] (Maternity/NICU/Pediatrics) [phone]   [address] [place] [address] [phone]   [facility] [address] [facility] [phone] [address] [facility] [address] [facility] [address] [place] [address] [phone]   [address] [facility] [address] [address] [facility] [phone]

## 2023-09-21 NOTE — ASSESSMENT & PLAN NOTE
Sodium 129 on admission. History of hyponatremia , likely related to alcohol abuse. · Check urine sodium 28, urine osm 173, serum osm 277, uric acid - WNL   · TSH low but normal free T4.   Recheck labs after discharge  · Gentle IV hydration  · Repeat lab in the morning    Results from last 7 days   Lab Units 09/21/23  0844 09/20/23 2013   SODIUM mmol/L 131* 129*

## 2023-09-21 NOTE — CONSULTS
Physician Requesting Consult: Indiana Blank DO    Reason for Consult / Principal Problem: Chest pain,GERD    Assessment/Plan:  82-year-old male with a past medical history of CAD, paroxysmal atrial fibrillation, hypertension, COPD, alcohol abuse, nicotine abuse, type 2 diabetes mellitus, prostate cancer who presents with report of  chest pain and tightness since Saturday. 1.  Chest pain   2. GERD  Patient presented with report of chest pain and tightness for the last 4 days. Patient reports that chest pain was worse with lying down and associated with upset stomach. Patient does report taking Tums at home for upset stomach. Patient is currently on no PPI or acid reduction medication. Patient reports symptoms are now resolved. Denies acid reflux, heartburn, nausea, vomiting, epigastric or abdominal pain.    -Continue pantoprazole 40 Mg daily  -Cardiology following  -Diet as tolerated  -Follow-up with GI as outpatient  -Patient will need EGD for further evaluation this can be done as an outpatient.  -Okay to discharge patient from GI standpoint if symptoms remain controlled. 3.  Colon cancer screening  Patient never had a colonoscopy, he is age 66.  -Colonoscopy for colon cancer screening as outpatient. HPI: Alex David is a 66 y.o. male  Chest pain. This is a 82-year-old male with a past medical history of CAD, paroxysmal atrial fibrillation, hypertension, COPD, alcohol abuse, nicotine abuse, type 2 diabetes mellitus, prostate cancer who presents with report of  chest pain and tightness since Saturday. Patient reports chest pain worse when lying down at night associated with upset stomach. Patient takes Tums as needed over-the-counter but is currently on no PPI or acid reduction medication. In ED patient alternating between atrial flutter and sinus rhythm. Cardiology following. Troponins unremarkable. On consultation patient reports that symptoms have since resolved.   Patient is no longer experiencing any chest pain. Patient denies nausea, vomiting, acid reflux, heartburn, epigastric or abdominal pain. Patient denies blood in stool, blood from rectal area, or black tarry stool. Abdomen exam benign no abdominal tenderness or guarding. Patient reports he recently stopped drinking alcohol in May. Patient does currently smoke but is trying to quit. Patient denies illicit drug use or marijuana use. Patient has never had a colonoscopy or EGD. Patient does follow with [address] [place] [address].       Allergies: No Known Allergies    Medications:  Current Facility-Administered Medications:   •  acetaminophen (TYLENOL) tablet 650 mg, 650 mg, Oral, Q6H PRN, CHELSEA Fisher, 650 mg at 09/21/23 0304  •  aluminum-magnesium hydroxide-simethicone (MAALOX) oral suspension 30 mL, 30 mL, Oral, Q4H PRN, CHELSEA Fisher, 30 mL at 09/21/23 0304  •  aspirin (ECOTRIN LOW STRENGTH) EC tablet 81 mg, 81 mg, Oral, Daily, CHELSEA Fisher, 81 mg at 09/21/23 5709  •  carvedilol (COREG) tablet 12.5 mg, 12.5 mg, Oral, BID With Meals, CHELSEA Fisher, 12.5 mg at 09/21/23 0801  •  enoxaparin (LOVENOX) subcutaneous injection 40 mg, 40 mg, Subcutaneous, Q24H SEDA, CHELSEA Fisher  •  ferrous sulfate tablet 325 mg, 325 mg, Oral, Daily With Breakfast, CHELSEA Fisher, 325 mg at 09/21/23 0801  •  fluticasone-vilanterol 200-25 mcg/actuation 1 puff, 1 puff, Inhalation, Daily, CHELSEA Fisher, 1 puff at 18/47/13 1635  •  folic acid (FOLVITE) tablet 1,000 mcg, 1,000 mcg, Oral, Daily, CHELSEA Fisher, 1,000 mcg at 09/21/23 0813  •  gabapentin (NEURONTIN) capsule 300 mg, 300 mg, Oral, TID, CHELSEA Fisher  •  insulin lispro (HumaLOG) 100 units/mL subcutaneous injection 1-5 Units, 1-5 Units, Subcutaneous, Q6H [address], 2 Units at 09/21/23 1144 **AND** Fingerstick Glucose (POCT), , , Q6H, CHELSEA Fisher  •  levalbuterol (XOPENEX) inhalation solution 0.63 mg, 0.63 mg, Nebulization, Q4H PRN, CHELSEA Fisher  • lisinopril (ZESTRIL) tablet 10 mg, 10 mg, Oral, Daily, Rosanne Mehta, CHELSEA  •  magnesium Oxide (MAG-OX) tablet 400 mg, 400 mg, Oral, BID, Rosanne Mehta, CHELSEA  •  melatonin tablet 3 mg, 3 mg, Oral, HS, Rosanne Mehta, CHELSEA  •  morphine injection 2 mg, 2 mg, Intravenous, Q4H PRN, Rosanne Mehta, CRMARCO ANTONIO, 2 mg at 09/21/23 2579  •  naltrexone (REVIA) tablet 50 mg, 50 mg, Oral, Daily, Rosanne Mehta, CHELSEA, 50 mg at 09/21/23 0820  •  nicotine (NICODERM CQ) 21 mg/24 hr TD 24 hr patch 1 patch, 1 patch, Transdermal, Daily, Rosanne Mehta, CHELSEA, 1 patch at 09/21/23 0805  •  ondansetron (ZOFRAN) injection 4 mg, 4 mg, Intravenous, Q6H PRN, CHELSEA Fisher  •  pantoprazole (PROTONIX) EC tablet 40 mg, 40 mg, Oral, Early Morning, Rosanne Mehta, CHELSEA, 40 mg at 09/21/23 6956  •  pravastatin (PRAVACHOL) tablet 40 mg, 40 mg, Oral, Daily With Dinner, CHELSEA Fisher  •  ranolazine (RANEXA) 12 hr tablet 500 mg, 500 mg, Oral, Q12H [address], Rosanne Mehta, CHELSEA, 500 mg at 09/21/23 1005  •  sodium chloride 0.9 % infusion, 50 mL/hr, Intravenous, Continuous, CHELSEA Fisher, Last Rate: 50 mL/hr at 09/21/23 0229, 50 mL/hr at 09/21/23 0229  •  tamsulosin (FLOMAX) capsule 0.4 mg, 0.4 mg, Oral, Daily With Dinner, Rosanne Mehta, CHELSEA  •  thiamine tablet 100 mg, 100 mg, Oral, Daily, CHELSEA Fisher, 100 mg at 09/21/23 9750  •  varenicline (CHANTIX) tablet 1 mg, 1 mg, Oral, BID, CHELSEA Fisher    Past Medical history:  Past Medical History:   Diagnosis Date   • Acute on chronic kidney failure ([address])    • Alcohol withdrawal ([address]) 06/07/2019   • Atrial fibrillation (HCC)    • Cancer ([address])     prostate ca,had radiation   • Cardiac disease     stents,then triple bypass   • COPD (chronic obstructive pulmonary disease) ([address])    • ETOH abuse    • Hx of heart artery stent     2014   • Hyperlipidemia    • Hypertension    • Hypovolemic shock ([address]) 12/22/2019   • Lumbar spondylitis ([address]) 10/13/2022   • Nasal bone fracture 10/10/2022   • Prostate CA ([address])    • S/P CABG x 3     2004   • Sleep apnea        Past Surgical History:   Past Surgical History:   Procedure Laterality Date   • CARDIAC CATHETERIZATION      2 stents   • CORONARY ARTERY BYPASS GRAFT  2004   • AK ARTHRD ANT INTERBODY MIN [address] CRV BELOW C2 N/A 12/16/2020    Procedure: Anterior cervical discectomy with fusion C4-C7; Posterior cervical decompression and fusion C2-T2;  Surgeon: Virgie Rodgers MD;  Location: [facility];  Service: Neurosurgery   • TONSILLECTOMY         Social history:   Social History     Tobacco Use   • Smoking status: Every Day     Packs/day: 1.50     Years: 40.00     Total pack years: 60.00     Types: Cigarettes   • Smokeless tobacco: Never   Vaping Use   • Vaping Use: Never used   Substance Use Topics   • Alcohol use: Not Currently     Alcohol/week: 4.0 standard drinks of alcohol     Types: 4 Standard drinks or equivalent per week   • Drug use: No       Family history:   Family History   Problem Relation Age of Onset   • Diabetes Mother    • Uterine cancer Mother    • COPD Father    • Hypertension Father         Review of Systems: Review of Systems   Respiratory: Positive for chest tightness and shortness of breath. All other systems reviewed and are negative. Physical Exam: Physical Exam  Vitals and nursing note reviewed. Constitutional:       General: He is not in acute distress. HENT:      Head: Normocephalic and atraumatic. Cardiovascular:      Rate and Rhythm: Normal rate and regular rhythm. Pulses: Normal pulses. Heart sounds: Normal heart sounds. Pulmonary:      Effort: Pulmonary effort is normal. No respiratory distress. Breath sounds: Normal breath sounds. No stridor. No wheezing, rhonchi or rales. Abdominal:      General: Bowel sounds are normal. There is no distension. Palpations: Abdomen is soft. There is no mass. Tenderness: There is no abdominal tenderness. There is no guarding or rebound. Hernia: No hernia is present. Musculoskeletal:      Cervical back: Normal range of motion and neck supple. Right lower leg: No edema. Left lower leg: No edema. Skin:     General: Skin is warm and dry. Capillary Refill: Capillary refill takes less than 2 seconds. Coloration: Skin is not jaundiced or pale. Neurological:      Mental Status: He is alert and oriented to person, place, and time.    Psychiatric:         Mood and Affect: Mood normal.           Lab Results:   Recent Results (from the past 24 hour(s))   Comprehensive metabolic panel    Collection Time: 09/20/23  8:13 PM   Result Value Ref Range    Sodium 129 (L) 135 - 147 mmol/L    Potassium 5.2 3.5 - 5.3 mmol/L    Chloride 96 96 - 108 mmol/L    CO2 24 21 - 32 mmol/L    ANION GAP 9 mmol/L    BUN 22 5 - 25 mg/dL    Creatinine 1.04 0.60 - 1.30 mg/dL    Glucose 89 65 - 140 mg/dL    Calcium 9.6 8.4 - 10.2 mg/dL    AST 35 13 - 39 U/L    ALT 28 7 - 52 U/L    Alkaline Phosphatase 51 34 - 104 U/L    Total Protein 7.8 6.4 - 8.4 g/dL    Albumin 4.2 3.5 - 5.0 g/dL    Total Bilirubin 0.42 0.20 - 1.00 mg/dL    eGFR 77 ml/min/1.73sq m   CBC and differential    Collection Time: 09/20/23  8:13 PM   Result Value Ref Range    WBC 8.83 4.31 - 10.16 Thousand/uL    RBC 4.18 3.88 - 5.62 Million/uL    Hemoglobin 14.4 12.0 - 17.0 g/dL    Hematocrit 40.8 36.5 - 49.3 %    MCV 98 82 - 98 fL    MCH 34.4 (H) 26.8 - 34.3 pg    MCHC 35.3 31.4 - 37.4 g/dL    RDW 13.5 11.6 - 15.1 %    MPV 10.5 8.9 - 12.7 fL    Platelets 374 209 - 259 Thousands/uL    nRBC 0 /100 WBCs    Neutrophils Relative 55 43 - 75 %    Immat GRANS % 1 0 - 2 %    Lymphocytes Relative 23 14 - 44 %    Monocytes Relative 14 (H) 4 - 12 %    Eosinophils Relative 6 0 - 6 %    Basophils Relative 1 0 - 1 %    Neutrophils Absolute 4.93 1.85 - 7.62 Thousands/µL    Immature Grans Absolute 0.05 0.00 - 0.20 Thousand/uL    Lymphocytes Absolute 2.00 0.60 - 4.47 Thousands/µL    Monocytes Absolute 1.23 (H) 0.17 - 1.22 Thousand/µL    Eosinophils Absolute 0.51 0.00 - 0.61 Thousand/µL    Basophils Absolute 0.11 (H) 0.00 - 0.10 Thousands/µL   Protime-INR    Collection Time: 09/20/23  8:13 PM   Result Value Ref Range    Protime 12.7 11.6 - 14.5 seconds    INR 0.94 0.84 - 1.19   APTT    Collection Time: 09/20/23  8:13 PM   Result Value Ref Range    PTT 28 23 - 37 seconds   D-Dimer    Collection Time: 09/20/23  8:13 PM   Result Value Ref Range    D-Dimer, Quant 1.00 (H) <0.50 ug/ml FEU   HS Troponin 0hr (reflex protocol)    Collection Time: 09/20/23  8:13 PM   Result Value Ref Range    hs TnI 0hr 19 "Refer to ACS Flowchart"- see link ng/L   B-Type Natriuretic Peptide(BNP)    Collection Time: 09/20/23  8:13 PM   Result Value Ref Range    BNP 95 0 - 100 pg/mL   Magnesium    Collection Time: 09/20/23  8:13 PM   Result Value Ref Range    Magnesium 1.9 1.9 - 2.7 mg/dL   Osmolality-"If this is regarding a toxic alcohol, STOP. Test is not routinely indicated. Please consult medical  on call for further guidance."    Collection Time: 09/20/23  8:13 PM   Result Value Ref Range    Osmolality Serum 277 (L) 282 - 298 mmol/KG   Uric acid    Collection Time: 09/20/23  8:13 PM   Result Value Ref Range    Uric Acid 5.2 3.5 - 8.5 mg/dL   Hemoglobin A1C    Collection Time: 09/20/23  8:13 PM   Result Value Ref Range    Hemoglobin A1C 6.1 (H) Normal 4.0-5.6%; PreDiabetic 5.7-6.4%;  Diabetic >=6.5%; Glycemic control for adults with diabetes <7.0% %     mg/dl   HS Troponin I 2hr    Collection Time: 09/20/23 10:26 PM   Result Value Ref Range    hs TnI 2hr 15 "Refer to ACS Flowchart"- see link ng/L    Delta 2hr hsTnI -4 <20 ng/L   UA (URINE) with reflex to Scope    Collection Time: 09/21/23 12:01 AM   Result Value Ref Range    Color, UA Yellow     Clarity, UA Clear     Specific Gravity, UA <=1.005 1.000 - 1.030    pH, UA 6.5 5.0, 5.5, 6.0, 6.5, 7.0, 7.5, 8.0, 8.5, 9.0    Leukocytes, UA Negative Negative    Nitrite, UA Negative Negative    Protein, UA Negative Negative mg/dl    Glucose, UA Negative Negative mg/dl    Ketones, UA Negative Negative mg/dl    Urobilinogen, UA 0.2 0.2, 1.0 E.U./dl E.U./dl    Bilirubin, UA Negative Negative    Occult Blood, UA Negative Negative   COVID only    Collection Time: 09/21/23 12:01 AM    Specimen: Nose; Nares   Result Value Ref Range    SARS-CoV-2 Negative Negative   Osmolality, urine    Collection Time: 09/21/23 12:01 AM   Result Value Ref Range    Osmolality, Ur 173 (L) 250 - 900 mmol/KG   Sodium, urine, random    Collection Time: 09/21/23 12:01 AM   Result Value Ref Range    Sodium, Ur 28 Reference range not established.    Fingerstick Glucose (POCT)    Collection Time: 09/21/23  2:33 AM   Result Value Ref Range    POC Glucose 194 (H) 65 - 140 mg/dl   Fingerstick Glucose (POCT)    Collection Time: 09/21/23  7:19 AM   Result Value Ref Range    POC Glucose 170 (H) 65 - 140 mg/dl   TSH, 3rd generation    Collection Time: 09/21/23  8:44 AM   Result Value Ref Range    TSH 3RD GENERATON 0.293 (L) 0.450 - 4.500 uIU/mL   Basic metabolic panel    Collection Time: 09/21/23  8:44 AM   Result Value Ref Range    Sodium 131 (L) 135 - 147 mmol/L    Potassium 4.8 3.5 - 5.3 mmol/L    Chloride 101 96 - 108 mmol/L    CO2 25 21 - 32 mmol/L    ANION GAP 5 mmol/L    BUN 21 5 - 25 mg/dL    Creatinine 1.07 0.60 - 1.30 mg/dL    Glucose 133 65 - 140 mg/dL    Glucose, Fasting 133 (H) 65 - 99 mg/dL    Calcium 9.3 8.4 - 10.2 mg/dL    eGFR 74 ml/min/1.73sq m   Magnesium    Collection Time: 09/21/23  8:44 AM   Result Value Ref Range    Magnesium 2.0 1.9 - 2.7 mg/dL   High Sensitivity Troponin I Random    Collection Time: 09/21/23 10:11 AM   Result Value Ref Range    HS TnI random 12 8 - 18 ng/L   Fingerstick Glucose (POCT)    Collection Time: 09/21/23 11:26 AM   Result Value Ref Range    POC Glucose 238 (H) 65 - 140 mg/dl           Imaging Studies: CTA ED chest PE study    Result Date: 9/20/2023  Narrative: CTA - CHEST WITH IV CONTRAST - PULMONARY ANGIOGRAM INDICATION: sob, chest tightness, tachycardia, elevated Ddimer. COMPARISON: CT chest abdomen pelvis 3/11/2022 TECHNIQUE: CTA examination of the chest was performed using angiographic technique according to a protocol specifically tailored to evaluate for pulmonary embolism. Multiplanar 2D reformatted images were created from the source data. In addition, coronal 3D MIP postprocessing was performed on the acquisition scanner. Radiation dose length product (DLP) for this visit:  627.51 mGy-cm . This examination, like all CT scans performed in the [facility], was performed utilizing techniques to minimize radiation dose exposure, including the use of iterative  reconstruction and automated exposure control. IV Contrast:  85 mL of iohexol (OMNIPAQUE) FINDINGS: PULMONARY ARTERIAL TREE: There is adequate opacification of the pulmonary arterial vasculature with no filling defects identified to indicate acute pulmonary emboli. Central pulmonary arteries are of normal caliber. LUNGS:  Lungs are clear. There is no tracheal or endobronchial lesion. PLEURA:  Unremarkable. HEART/GREAT VESSELS: The heart is mildly enlarged. Coronary artery calcification is present. RV/LV ratio normal at 0.9. No thoracic aortic aneurysm. MEDIASTINUM AND DENZEL:  Unremarkable. CHEST WALL AND LOWER NECK:   Post median sternotomy. VISUALIZED STRUCTURES IN THE UPPER ABDOMEN: Partially imaged adrenal glands demonstrate nodularity, previously described and likely reflecting nodular adrenal hyperplasia. OSSEOUS STRUCTURES: Degenerative change present in the spine. Inferior aspect of posterior instrumented fusion hardware noted extending to the C2 level. Impression: 1. No evidence for acute pulmonary embolism or other acute intrathoracic abnormality. 2. Additional findings as noted.  Workstation performed: [id number]       Problem List:   Patient Active Problem List   Diagnosis   • COPD (chronic obstructive pulmonary disease) ([address])   • Type 2 diabetes mellitus ([address])   • Essential hypertension   • Hypomagnesemia   • Depression, recurrent (HCC)   • Hx of CABG   • Dyslipidemia   • History of prostate cancer   • CAD (coronary artery disease)   • Nicotine abuse   • Thrombocytopenia (HCC)   • Alcohol abuse   • Hyponatremia   • Cervical spinal stenosis   • Alcohol intoxication ([address])   • Chronic heart failure with preserved ejection fraction (HCC)   • History of Atrial fibrillation (HCC)   • Mild protein-calorie malnutrition (HCC)   • Prostate cancer ([address])   • Poor historian   • History of atrial fibrillation   • Nonischemic nontraumatic myocardial injury   • Fatty liver, alcoholic   • Stage 3a chronic kidney disease (HCC)   • Stable angina (HCC)   • Chest pain   • Paroxysmal atrial fibrillation with rapid ventricular response (HCC)   • GERD (gastroesophageal reflux disease)   • Elevated d-dimer   • Sleep apnea         CHELSEA Cid      Please Note: "This note has been constructed using a voice recognition system. Therefore there may be syntax, spelling, and/or grammatical errors.  Please call if you have any questions. "**

## 2023-09-21 NOTE — ASSESSMENT & PLAN NOTE
Could not afford CPAP in the past.   · Patient thinks he needs CPAP.  Recommend sleep study as outpatient to qualify for a new machine

## 2023-09-21 NOTE — PLAN OF CARE
Problem: PAIN - ADULT  Goal: Verbalizes/displays adequate comfort level or baseline comfort level  Description: Interventions:  - Encourage patient to monitor pain and request assistance  - Assess pain using appropriate pain scale  - Administer analgesics based on type and severity of pain and evaluate response  - Implement non-pharmacological measures as appropriate and evaluate response  - Consider cultural and social influences on pain and pain management  - Notify physician/advanced practitioner if interventions unsuccessful or patient reports new pain  Outcome: Progressing     Problem: CARDIOVASCULAR - ADULT  Goal: Maintains optimal cardiac output and hemodynamic stability  Description: INTERVENTIONS:  - Monitor I/O, vital signs and rhythm  - Monitor for S/S and trends of decreased cardiac output  - Administer and titrate ordered vasoactive medications to optimize hemodynamic stability  - Assess quality of pulses, skin color and temperature  - Assess for signs of decreased coronary artery perfusion  - Instruct patient to report change in severity of symptoms  Outcome: Progressing     Problem: CARDIOVASCULAR - ADULT  Goal: Absence of cardiac dysrhythmias or at baseline rhythm  Description: INTERVENTIONS:  - Continuous cardiac monitoring, vital signs, obtain 12 lead EKG if ordered  - Administer antiarrhythmic and heart rate control medications as ordered  - Monitor electrolytes and administer replacement therapy as ordered  Outcome: Progressing

## 2023-09-21 NOTE — ASSESSMENT & PLAN NOTE
Lab Results   Component Value Date    HGBA1C 5.8 (H) 06/16/2023       Recent Labs     09/21/23  0233   POCGLU 194*       Blood Sugar Average: Last 72 hrs:  (P) 194     · On metformin at home.   Will hold while inpatient  · SSI  · Update A1c

## 2023-09-21 NOTE — ASSESSMENT & PLAN NOTE
EKG with a flutter with RVR per cardiology  · In the afternoon reported chest pressure, felt anxious and was noted to be tachycardic on the monitor  · Coreg changed to Lopressor  · Appreciate cardiology input.   Started on Eliquis

## 2023-09-21 NOTE — ED PROVIDER NOTES
History  Chief Complaint   Patient presents with   • Shortness of Breath     Patient c/o sob and diffuse cp since Saturday, feels worse when laying down     65 yo male with chest pressure and tightness and sob x 4 days. Pain is constant but worse with lying down. He thinks his afib is acting up more than usual.  No fever, cough, vomiting, diarrhea. He doesn't know if he's on a blood thinner or not. History provided by:  Patient   used: No    Shortness of Breath  Associated symptoms: chest pain    Associated symptoms: no abdominal pain, no cough, no fever, no headaches, no rash, no sore throat and no vomiting        Prior to Admission Medications   Prescriptions Last Dose Informant Patient Reported? Taking? Blood Glucose Monitoring Suppl (ONE TOUCH ULTRA MINI) w/Device KIT  Self Yes No   Sig: Use as directed   Breo Ellipta 200-25 MCG/ACT inhaler   No No   Sig: Inhale 1 puff daily Rinse mouth after use.    ONETOUCH DELICA LANCETS FINE MISC  Self Yes No   Sig: 3 (three) times a day Test   Thiamine HCl (vitamin B-1) 100 MG TABS  Self No No   Sig: TAKE 1 TABLET DAILY   albuterol (2.5 mg/3 mL) 0.083 % nebulizer solution   No No   Sig: Take 3 mL (2.5 mg total) by nebulization every 6 (six) hours as needed for wheezing or shortness of breath   albuterol (Ventolin HFA) 90 mcg/act inhaler   No No   Sig: Inhale 2 puffs every 4 (four) hours as needed for wheezing   amLODIPine (NORVASC) 5 mg tablet   No No   Sig: TAKE 1 TABLET DAILY   carvedilol (COREG) 12.5 mg tablet   No No   Sig: Take 1 tablet (12.5 mg total) by mouth 2 (two) times a day with meals   ferrous sulfate 325 (65 Fe) mg tablet   No No   Sig: Take 1 tablet (325 mg total) by mouth daily with breakfast   folic acid (FOLVITE) 1 mg tablet   No No   Sig: TAKE 1 TABLET DAILY   gabapentin (NEURONTIN) 300 mg capsule   No No   Sig: TAKE ONE CAPSULE THREE TIMES DAILY   lisinopril (ZESTRIL) 20 mg tablet   No No   Sig: Take 1 tablet (20 mg total) by mouth daily   magnesium Oxide (MAG-OX) 400 mg TABS   No No   Sig: TAKE 1 TABLET TWO TIMES A DAY   metFORMIN (GLUCOPHAGE) 500 mg tablet   No No   Sig: TAKE 1 TABLET DAILY WITH BREAKFAST   naltrexone (REVIA) 50 mg tablet   No No   Sig: Take 1 tablet (50 mg total) by mouth daily Do not start before May 14, 2023. pantoprazole (PROTONIX) 40 mg tablet  Self No No   Sig: Take 1 tablet (40 mg total) by mouth daily in the early morning Do not start before November 26, 2022. pravastatin (PRAVACHOL) 40 mg tablet   No No   Sig: TAKE 1 TABLET DAILY WITH DINNER   ranolazine (RANEXA) 500 mg 12 hr tablet   No No   Sig: TAKE 1 TABLET EVERY 12 HOURS   tamsulosin (FLOMAX) 0.4 mg   No No   Sig: TAKE 1 CAPSULE DAILY   varenicline (CHANTIX) 1 mg tablet   No No   Sig: TAKE 1 TABLET 2 TIMES A DAY   varenicline 0.5 MG X 11 & 1 MG X 42 tablet therapy pack   No No   Sig: Take 0.5 mg by mouth daily for 3 days, THEN 0.5 mg 2 (two) times a day for 4 days, THEN 1 mg 2 (two) times a day for 21 days. Patient not taking: Reported on 6/13/2023      Facility-Administered Medications: None       Past Medical History:   Diagnosis Date   • Acute on chronic kidney failure (HCC)    • Alcohol withdrawal ([address]) 6/7/2019   • Cancer ([address])     prostate ca,had radiation   • Cardiac disease     stents,then triple bypass   • COPD (chronic obstructive pulmonary disease) ([address])    • ETOH abuse    • Hx of heart artery stent     2014   • Hyperlipidemia    • Hypertension    • Hypovolemic shock ([address]) 12/22/2019   • Lumbar spondylitis ([address]) 10/13/2022   • Nasal bone fracture 10/10/2022   • Prostate CA ([address])    • S/P CABG x 1     2004       Past Surgical History:   Procedure Laterality Date   • CORONARY ARTERY BYPASS GRAFT  2004   • MI ARTHRD ANT INTERBODY MIN [address] CRV BELOW C2 N/A 12/16/2020    Procedure: Anterior cervical discectomy with fusion C4-C7;  Posterior cervical decompression and fusion C2-T2;  Surgeon: Marika Cobb MD;  Location: [facility];  Service: Neurosurgery   • TONSILLECTOMY         Family History   Problem Relation Age of Onset   • Diabetes Mother    • Uterine cancer Mother    • COPD Father    • Hypertension Father      I have reviewed and agree with the history as documented. E-Cigarette/Vaping   • E-Cigarette Use Never User      E-Cigarette/Vaping Substances   • Nicotine Yes    • THC No    • CBD No    • Flavoring No    • Other No    • Unknown No      Social History     Tobacco Use   • Smoking status: Every Day     Packs/day: 1.50     Years: 40.00     Total pack years: 60.00     Types: Cigarettes   • Smokeless tobacco: Never   Vaping Use   • Vaping Use: Never used   Substance Use Topics   • Alcohol use: Not Currently     Alcohol/week: 4.0 standard drinks of alcohol     Types: 4 Standard drinks or equivalent per week   • Drug use: No       Review of Systems   Constitutional: Negative. Negative for chills and fever. HENT: Negative. Negative for congestion and sore throat. Eyes: Negative. Respiratory: Positive for shortness of breath. Negative for cough. Cardiovascular: Positive for chest pain. Negative for leg swelling. Gastrointestinal: Negative. Negative for abdominal pain, diarrhea, nausea and vomiting. Genitourinary: Negative. Negative for dysuria, flank pain and hematuria. Musculoskeletal: Negative. Negative for back pain and myalgias. Skin: Negative. Negative for rash and wound. Neurological: Negative. Negative for dizziness and headaches. Psychiatric/Behavioral: Negative. Negative for confusion and hallucinations. The patient is not nervous/anxious. All other systems reviewed and are negative. Physical Exam  Physical Exam  Vitals and nursing note reviewed. Constitutional:       General: He is not in acute distress. Appearance: He is well-developed. He is not ill-appearing or diaphoretic. HENT:      Head: Normocephalic and atraumatic. Eyes:      General: No scleral icterus.      Conjunctiva/sclera: Conjunctivae normal.      Pupils: Pupils are equal, round, and reactive to light. Cardiovascular:      Rate and Rhythm: Normal rate and regular rhythm. Heart sounds: Normal heart sounds. No murmur heard. Pulmonary:      Effort: Pulmonary effort is normal. No respiratory distress. Breath sounds: Decreased breath sounds and wheezing present. Chest:      Chest wall: No tenderness. Abdominal:      General: Bowel sounds are normal. There is no distension. Palpations: Abdomen is soft. Tenderness: There is no abdominal tenderness. Musculoskeletal:         General: No tenderness or deformity. Normal range of motion. Cervical back: Normal range of motion and neck supple. Right lower leg: No tenderness. No edema. Left lower leg: No tenderness. No edema. Skin:     General: Skin is warm and dry. Coloration: Skin is not pale. Findings: No erythema or rash. Neurological:      General: No focal deficit present. Mental Status: He is alert and oriented to person, place, and time. Cranial Nerves: No cranial nerve deficit.    Psychiatric:         Mood and Affect: Mood normal.         Behavior: Behavior normal.         Vital Signs  ED Triage Vitals   Temperature Pulse Respirations Blood Pressure SpO2   09/20/23 1957 09/20/23 2000 09/20/23 2000 09/20/23 2000 09/20/23 2000   (!) 97.1 °F (36.2 °C) (!) 139 18 131/89 95 %      Temp Source Heart Rate Source Patient Position - Orthostatic VS BP Location FiO2 (%)   09/20/23 1957 -- -- 09/20/23 2000 --   Oral   Right arm       Pain Score       --                  Vitals:    09/20/23 2100 09/20/23 2115 09/20/23 2130 09/20/23 2230   BP: 164/97  146/91    Pulse: 85 69 (!) 134 82         Visual Acuity      ED Medications  Medications   ipratropium-albuterol (DUO-NEB) 0.5-2.5 mg/3 mL inhalation solution 3 mL (3 mL Nebulization Given 9/20/23 2122)   metoprolol (LOPRESSOR) injection 5 mg (has no administration in time range) carvedilol (COREG) tablet 12.5 mg (12.5 mg Oral Given 9/20/23 2103)   methylPREDNISolone sodium succinate (Solu-MEDROL) injection 100 mg (100 mg Intravenous Given 9/20/23 2121)   iohexol (OMNIPAQUE) 350 MG/ML injection (SINGLE-DOSE) 85 mL (85 mL Intravenous Given 9/20/23 2211)       Diagnostic Studies  Results Reviewed     Procedure Component Value Units Date/Time    HS Troponin I 2hr [[id number]]  (Normal) Collected: 09/20/23 2226    Lab Status: Final result Specimen: Blood from Line, Venous Updated: 09/20/23 2254     hs TnI 2hr 15 ng/L      Delta 2hr hsTnI -4 ng/L     HS Troponin I 4hr [[id number]]     Lab Status: No result Specimen: Blood     Comprehensive metabolic panel [[id number]]  (Abnormal) Collected: 09/20/23 2013    Lab Status: Final result Specimen: Blood from Arm, Left Updated: 09/20/23 2114     Sodium 129 mmol/L      Potassium 5.2 mmol/L      Chloride 96 mmol/L      CO2 24 mmol/L      ANION GAP 9 mmol/L      BUN 22 mg/dL      Creatinine 1.04 mg/dL      Glucose 89 mg/dL      Calcium 9.6 mg/dL      AST 35 U/L      ALT 28 U/L      Alkaline Phosphatase 51 U/L      Total Protein 7.8 g/dL      Albumin 4.2 g/dL      Total Bilirubin 0.42 mg/dL      eGFR 77 ml/min/1.73sq m     Narrative:      [facility] (CKD):   •  Stage 1 with normal or high GFR (GFR > 90 mL/min/1.73 square meters)  •  Stage 2 Mild CKD (GFR = 60-89 mL/min/1.73 square meters)  •  Stage 3A Moderate CKD (GFR = 45-59 mL/min/1.73 square meters)  •  Stage 3B Moderate CKD (GFR = 30-44 mL/min/1.73 square meters)  •  Stage 4 Severe CKD (GFR = 15-29 mL/min/1.73 square meters)  •  Stage 5 End Stage CKD (GFR <15 mL/min/1.73 square meters)  Note: GFR calculation is accurate only with a steady state creatinine    Magnesium [[id number]]  (Normal) Collected: 09/20/23 2013    Lab Status: Final result Specimen: Blood from Arm, Left Updated: 09/20/23 2114     Magnesium 1.9 mg/dL     HS Troponin 0hr (reflex protocol) [[id number]]  (Normal) Collected: 09/20/23 2013    Lab Status: Final result Specimen: Blood from Arm, Left Updated: 09/20/23 2041     hs TnI 0hr 19 ng/L     B-Type Natriuretic Peptide(BNP) [[id number]]  (Normal) Collected: 09/20/23 2013    Lab Status: Final result Specimen: Blood from Arm, Left Updated: 09/20/23 2040     BNP 95 pg/mL     D-Dimer [[id number]]  (Abnormal) Collected: 09/20/23 2013    Lab Status: Final result Specimen: Blood from Arm, Left Updated: 09/20/23 2035     D-Dimer, Quant 1.00 ug/ml FEU     Narrative: In the evaluation for possible pulmonary embolism, in the appropriate (Well's Score of 4 or less) patient, the age adjusted d-dimer cutoff for this patient can be calculated as:    Age x 0.01 (in ug/mL) for Age-adjusted D-dimer exclusion threshold for a patient over 50 years.     Protime-INR [[id number]]  (Normal) Collected: 09/20/23 2013    Lab Status: Final result Specimen: Blood from Arm, Left Updated: 09/20/23 2030     Protime 12.7 seconds      INR 0.94    APTT [[id number]]  (Normal) Collected: 09/20/23 2013    Lab Status: Final result Specimen: Blood from Arm, Left Updated: 09/20/23 2030     PTT 28 seconds     CBC and differential [[id number]]  (Abnormal) Collected: 09/20/23 2013    Lab Status: Final result Specimen: Blood from Arm, Left Updated: 09/20/23 2018     WBC 8.83 Thousand/uL      RBC 4.18 Million/uL      Hemoglobin 14.4 g/dL      Hematocrit 40.8 %      MCV 98 fL      MCH 34.4 pg      MCHC 35.3 g/dL      RDW 13.5 %      MPV 10.5 fL      Platelets 268 Thousands/uL      nRBC 0 /100 WBCs      Neutrophils Relative 55 %      Immat GRANS % 1 %      Lymphocytes Relative 23 %      Monocytes Relative 14 %      Eosinophils Relative 6 %      Basophils Relative 1 %      Neutrophils Absolute 4.93 Thousands/µL      Immature Grans Absolute 0.05 Thousand/uL      Lymphocytes Absolute 2.00 Thousands/µL      Monocytes Absolute 1.23 Thousand/µL      Eosinophils Absolute 0.51 Thousand/µL Basophils Absolute 0.11 Thousands/µL     UA (URINE) with reflex to Scope [[id number]]     Lab Status: No result Specimen: Urine                  CTA ED chest PE study   Final Result by Laury Eden MD (09/20 2328)         1. No evidence for acute pulmonary embolism or other acute intrathoracic abnormality. 2. Additional findings as noted.                   Workstation performed: [id number]                    Procedures  ECG 12 Lead Documentation Only    Date/Time: 9/20/2023 8:05 PM    Performed by: Maurice Chahal MD  Authorized by: Maurice Chahal MD    Indications / Diagnosis:  Chest pain  ECG reviewed by me, the ED Provider: yes    Patient location:  ED  Previous ECG:     Previous ECG:  Compared to current    Similarity:  Changes noted  Interpretation:     Interpretation: abnormal    Rate:     ECG rate:  142    ECG rate assessment: tachycardic    Rhythm:     Rhythm: atrial fibrillation    QRS:     QRS axis:  Normal  Conduction:     Conduction: normal    ST segments:     ST segments:  Non-specific  T waves:     T waves: inverted      Inverted:  III and aVF    CriticalCare Time    Date/Time: 9/20/2023 11:48 PM    Performed by: Maurice Chahal MD  Authorized by: Maurice Chahal MD    Critical care provider statement:     Critical care time (minutes):  30    Critical care time was exclusive of:  Separately billable procedures and treating other patients    Critical care was necessary to treat or prevent imminent or life-threatening deterioration of the following conditions:  Cardiac failure    Critical care was time spent personally by me on the following activities:  Obtaining history from patient or surrogate, development of treatment plan with patient or surrogate, evaluation of patient's response to treatment, examination of patient, interpretation of cardiac output measurements, ordering and performing treatments and interventions, ordering and review of laboratory studies, ordering and review of radiographic studies, re-evaluation of patient's condition and review of old charts    I assumed direction of critical care for this patient from another provider in my specialty: no               ED Course                               SBIRT 20yo+    Flowsheet Row Most Recent Value   Initial Alcohol Screen: US AUDIT-C     1. How often do you have a drink containing alcohol? 0 Filed at: 09/20/2023 2000   2. How many drinks containing alcohol do you have on a typical day you are drinking? 0 Filed at: 09/20/2023 2000   3a. Male UNDER 65: How often do you have five or more drinks on one occasion? 0 Filed at: 09/20/2023 2000   Audit-C Score 0 Filed at: 09/20/2023 2000   BRIGIDA: How many times in the past year have you. .. Used an illegal drug or used a prescription medication for non-medical reasons? Never Filed at: 09/20/2023 2000                    Medical Decision Making  On monitor, pt. Is alternates frequently between Afib with -140ss and NSR in the 60-70s with BP on the softer side. Initial trop 19, Ddimer elevated. Will do CTA and plan to give usual coreg dose and neb/solumedrol for wheezing/COPD.    2315 - delta trop (-4). Awaiting CTA report from radiology. Pt. Still going in and out of sinus and afib with RVR and still complaining of chest pain off and on when he lays flat. 2335 - CTA chest negative. Will admit for cardiology consult and further evaluation. Discussed with SLIM. Amount and/or Complexity of Data Reviewed  Labs: ordered. Radiology: ordered. Risk  Prescription drug management. Decision regarding hospitalization.           Disposition  Final diagnoses:   Paroxysmal atrial fibrillation (HCC)   Chest pain   COPD exacerbation ([address])     Time reflects when diagnosis was documented in both MDM as applicable and the Disposition within this note     Time User Action Codes Description Comment    5/21/8219  9:99 PM Karen CALDERON Add [Q61.2] Paroxysmal atrial fibrillation ([address]) 1/98/4566  7:86 PM Meghan CALDERON Add [Y18.8] Chest pain     2/88/4721 07:74 PM Meghan CALDERON Add [R07.8] COPD exacerbation [facility])       ED Disposition     ED Disposition   Admit    Condition   Stable    Date/Time   Wed Sep 20, 2023 11:37 PM    Comment   Case was discussed with **SLIM* and the patient's admission status was agreed to be Admission Status: observation status          Follow-up Information    None         Patient's Medications   Discharge Prescriptions    No medications on file       No discharge procedures on file.     PDMP Review       Value Time User    PDMP Reviewed  Yes 7/26/2021  4:50 PM Jackelin Guan MD          ED Provider  Electronically Signed by           Meghan Farmer MD  23/45/58 [id number]

## 2023-09-21 NOTE — ASSESSMENT & PLAN NOTE
Lab Results   Component Value Date    HGBA1C 6.1 (H) 09/20/2023       Recent Labs     09/21/23  0233 09/21/23  0719 09/21/23  1126   POCGLU 194* 170* 238*     On metformin at home, holding while here  · SSI Warm

## 2023-09-21 NOTE — PROGRESS NOTES
[id number] [facility]  Progress Note  Name: Eliud Royal  MRN: [id number]  Unit/Bed#: 2 South 219 B I Date of Admission: 9/20/2023   Date of Service: 9/21/2023 I Hospital Day: 0    Assessment/Plan   * Chest pain  Assessment & Plan  Patient presented with localized intermittent chest pain since Saturday, reported chest pain worse when laying down at night, with associated upset stomach. · History of CAD, status post triple bypass in 2004, PCI with stents x2 in 2014. · History of paroxysmal A-fib briefly postoperatively   · Nuclear stress test in November last year was negative  · 2D echo in November last year showed EF 43%, grade 1 diastolic dysfunction, no significant valve disease  · EKG in ED -a flutter per cardiology with T wave inversion in III and aVF  · Troponins unremarkable    Paroxysmal atrial fibrillation with rapid ventricular response (HCC)  Assessment & Plan  EKG with a flutter with RVR per cardiology  · In the afternoon reported chest pressure, felt anxious and was noted to be tachycardic on the monitor  · Coreg changed to Lopressor  · Appreciate cardiology input. Started on Eliquis    Hyponatremia  Assessment & Plan  Sodium 129 on admission. History of hyponatremia , likely related to alcohol abuse. · Check urine sodium 28, urine osm 173, serum osm 277, uric acid - WNL   · TSH low but normal free T4.   Recheck labs after discharge  · Gentle IV hydration  · Repeat lab in the morning    Results from last 7 days   Lab Units 09/21/23  0844 09/20/23 2013   SODIUM mmol/L 131* 129*       GERD (gastroesophageal reflux disease)  Assessment & Plan  · Continue PPI  · Follow-up with GI after discharge for outpatient EGD/colonoscopy    COPD (chronic obstructive pulmonary disease) ([address])  Assessment & Plan  Patient was wheezing on ED arrival  · Received IV Solu-Medrol  · Not taking Breo regularly at home, continue while here  · Xopenex as needed  · Hold off on steroids    Sleep apnea  Assessment & Plan  Could not afford CPAP in the past.   · Patient thinks he needs CPAP. Recommend sleep study as outpatient to qualify for a new machine    Alcohol abuse  Assessment & Plan  History of alcohol use. Quit 2 months ago. · Continue thiamine, ReVia, folic acid, MVI    Nicotine abuse  Assessment & Plan  · Nicotine patch, smoking cessation. CAD (coronary artery disease)  Assessment & Plan  Continue aspirin, Ranexa, statin  Coreg changed to Lopressor    History of prostate cancer  Assessment & Plan  · Noted history. Essential hypertension  Assessment & Plan  On Norvasc Coreg, lisinopril at home  · Holding Norvasc  · Coreg changed to Lopressor. Continue lisinopril at a lower dose of 10 mg daily  · BPs labile    Type 2 diabetes mellitus ([address])  Assessment & Plan  Lab Results   Component Value Date    HGBA1C 6.1 (H) 09/20/2023       Recent Labs     09/21/23  0233 09/21/23  0719 09/21/23  1126   POCGLU 194* 170* 238*     On metformin at home, holding while here  · SSI    Elevated [id number] as of 9/21/2023  Assessment & Plan  · D-dimer 1.0  · CTA chest PE study no PE.           VTE Pharmacologic Prophylaxis:   suad    Patient Centered Rounds: I performed bedside rounds with nursing staff today. Discussions with Specialists or Other Care Team Provider: yes - cardio, GI    Education and Discussions with Family / Patient: Patient declined call to . Total Time Spent on Date of Encounter in care of patient: This time was spent on one or more of the following: performing physical exam; counseling and coordination of care; obtaining or reviewing history; documenting in the medical record; reviewing/ordering tests, medications or procedures; communicating with other healthcare professionals and discussing with patient's family/caregivers.     Current Length of Stay: 0 day(s)  Current Patient Status: Inpatient   Certification Statement: The patient will continue to require additional inpatient hospital stay due to PAF requiring adjustments of meds and monitoring on tele  Discharge Plan: Anticipate discharge in 24-48 hrs to home. Code Status: Level 1 - Full Code    Subjective:     Patient seen earlier this morning where he denied any chest pain. Later in the afternoon noted to be tachycardic on the monitor and reported some chest pressure and feeling anxious    Objective:     Vitals:   Temp (24hrs), Av.7 °F (36.5 °C), Min:97.1 °F (36.2 °C), Max:98.1 °F (36.7 °C)    Temp:  [97.1 °F (36.2 °C)-98.1 °F (36.7 °C)] 97.9 °F (36.6 °C)  HR:  [] 86  Resp:  [15-22] 17  BP: ()/(58-97) 119/58  SpO2:  [93 %-100 %] 95 %  Body mass index is 26.64 kg/m². Input and Output Summary (last 24 hours): Intake/Output Summary (Last 24 hours) at 2023 1549  Last data filed at 2023 0401  Gross per 24 hour   Intake --   Output 400 ml   Net -400 ml       Physical Exam:   Physical Exam  Vitals reviewed. Constitutional:       General: He is not in acute distress. Appearance: He is not ill-appearing. HENT:      Head: Normocephalic and atraumatic. Eyes:      General:         Right eye: No discharge. Left eye: No discharge. Cardiovascular:      Rate and Rhythm: Normal rate and regular rhythm. Pulmonary:      Effort: Pulmonary effort is normal. No respiratory distress. Breath sounds: Wheezing (minimal, intermittent) present. No rales. Abdominal:      General: Bowel sounds are normal. There is no distension. Palpations: Abdomen is soft. Tenderness: There is no abdominal tenderness. Musculoskeletal:      Right lower leg: No edema. Left lower leg: No edema. Neurological:      Mental Status: He is alert and oriented to person, place, and time.          Additional Data:     Labs:  Results from last 7 days   Lab Units 23   WBC Thousand/uL 8.83   HEMOGLOBIN g/dL 14.4   HEMATOCRIT % 40.8   PLATELETS Thousands/uL 205   NEUTROS PCT % 55 LYMPHS PCT % 23   MONOS PCT % 14*   EOS PCT % 6     Results from last 7 days   Lab Units 09/21/23  0844 09/20/23 2013   SODIUM mmol/L 131* 129*   POTASSIUM mmol/L 4.8 5.2   CHLORIDE mmol/L 101 96   CO2 mmol/L 25 24   BUN mg/dL 21 22   CREATININE mg/dL 1.07 1.04   ANION GAP mmol/L 5 9   CALCIUM mg/dL 9.3 9.6   ALBUMIN g/dL  --  4.2   TOTAL BILIRUBIN mg/dL  --  0.42   ALK PHOS U/L  --  51   ALT U/L  --  28   AST U/L  --  35   GLUCOSE RANDOM mg/dL 133 89     Results from last 7 days   Lab Units 09/20/23 2013   INR  0.94     Results from last 7 days   Lab Units 09/21/23  1126 09/21/23  0719 09/21/23  0233   POC GLUCOSE mg/dl 238* 170* 194*     Results from last 7 days   Lab Units 09/20/23 2013   HEMOGLOBIN A1C % 6.1*           Lines/Drains:  Invasive Devices     Peripheral Intravenous Line  Duration           Peripheral IV 09/20/23 Left Antecubital <1 day                  Telemetry:  Telemetry Orders (From admission, onward)             24 Hour Telemetry Monitoring  (ED Bridging Orders Panel)  Continuous x 24 Hours (Telem)        Question:  Reason for 24 Hour Telemetry  Answer:  Arrhythmias requiring acute medical intervention / PPM or ICD malfunction                 Telemetry Reviewed: Normal Sinus Rhythm  Indication for Continued Telemetry Use: Arrthymias requiring medical therapy             Imaging: Reviewed radiology reports from this admission including: chest CT scan    Recent Cultures (last 7 days):         Last 24 Hours Medication List:   Current Facility-Administered Medications   Medication Dose Route Frequency Provider Last Rate   • acetaminophen  650 mg Oral Q6H PRN CHELSEA Fisher     • aluminum-magnesium hydroxide-simethicone  30 mL Oral Q4H PRN CHELSEA Fisher     • apixaban  5 mg Oral BID Toribio Moreno PA-C     • aspirin  81 mg Oral Daily CHELSEA Fisher     • ferrous sulfate  325 mg Oral Daily With Breakfast CHELSEA Fisher     • fluticasone-vilanterol  1 puff Inhalation Daily Rosanne Danya Villatoro     • folic acid  0,585 mcg Oral Daily CHELSEA Fisher     • furosemide  20 mg Intravenous Once Yoli TANK Gill     • gabapentin  300 mg Oral TID CHELSEA Fisher     • insulin lispro  1-5 Units Subcutaneous Q6H [address] CHELSEA Fisher     • levalbuterol  0.63 mg Nebulization Q4H PRN CHELSEA Fisher     • lisinopril  10 mg Oral Daily CHELSEA Fisher     • magnesium Oxide  400 mg Oral BID CHELSEA Fisher     • melatonin  3 mg Oral HS CHELSEA Fisher     • metoprolol tartrate  12.5 mg Oral Q12H [address] Yoli Gill PA-C     • morphine injection  2 mg Intravenous Q4H PRN CHELSEA Fisher     • naltrexone  50 mg Oral Daily CHELSEA Fisher     • nicotine  1 patch Transdermal Daily CHELSEA Fisher     • ondansetron  4 mg Intravenous Q6H PRN CHELSEA Fisher     • pantoprazole  40 mg Oral Early Morning CHELSEA Fisher     • pravastatin  40 mg Oral Daily With CHELSEA Melo     • ranolazine  500 mg Oral Q12H [address] CHELSEA Fisher     • sodium chloride  50 mL/hr Intravenous Continuous CHELSEA Fisher 50 mL/hr (09/21/23 0229)   • tamsulosin  0.4 mg Oral Daily With Dinner CHELSEA Fisher     • vitamin B-1  100 mg Oral Daily CHELSEA Fisher     • varenicline  1 mg Oral BID CHELSEA Fisher          Today, Patient Was Seen By: Kesha Steele DO    **Please Note: This note may have been constructed using a voice recognition system. **

## 2023-09-21 NOTE — ASSESSMENT & PLAN NOTE
Patient presented with localized intermittent chest pain since Saturday, reported chest pain worse when laying down at night, with associated upset stomach. · History of CAD, status post triple bypass in 2004, PCI with stents x2 in 2014.   · History of paroxysmal A-fib briefly postoperatively   · Nuclear stress test in November last year was negative  · 2D echo in November last year showed EF 25%, grade 1 diastolic dysfunction, no significant valve disease  · EKG in ED -a flutter per cardiology with T wave inversion in III and aVF  · Troponins unremarkable

## 2023-09-21 NOTE — ASSESSMENT & PLAN NOTE
Patient was wheezing on ED arrival  · Received IV Solu-Medrol  · Not taking Breo regularly at home, continue while here  · Xopenex as needed  · Hold off on steroids

## 2023-09-21 NOTE — ASSESSMENT & PLAN NOTE
On Norvasc Coreg, lisinopril at home  · Holding Norvasc  · Coreg changed to Lopressor.   Continue lisinopril at a lower dose of 10 mg daily  · BPs labile

## 2023-09-21 NOTE — ASSESSMENT & PLAN NOTE
· Sodium 129. History of hyponatremia , likely related to alcohol abuse.   · Check urine sodium, urine osmole, serum osmole, uric acid, TSH, free T4  · Gentle IV hydration  · Repeat lab in the morning

## 2023-09-21 NOTE — CONSULTS
Consultation - Cardiology   [facility]     Mary Nguyen 66 y.o. male MRN: [id number]  : 1960  Unit/Bed#: 2 [facility] 219 B Encounter: [id number]      Assessment & Plan   1. Paroxysmal atrial flutter. - Presented on 2023 for evaluation for chest pain. Was found to be in atrial flutter with RVR. Was given Cardizem. ED physician notes indicate the patient was alternating between atrial flutter and sinus rhythm. - Review of prior cardiology notes indicates with episode of atrial fibrillation in  in setting of hypokalemia and hypomagnesia secondary to alcohol abuse. - 23 EKG: atrial flutter, 142 bpm. ST and T wave abnormalities in inferior leads. - 23 hs troponin: 19 (0hrs),  15 (2hrs). - 23 hs troponin random: 12.   - Reviewed telemetry notes patient currently in sinus rhythm. - Was on Coreg 12.5 mg twice daily. Will switch to lopressor 12.5 mg twice daily and titrate up as tolerated. - 22 nuclear stress test: Stress Combined Conclusion - the ECG and SPECT imaging portions of the stress study are concordant with no evidence of stress induced myocardial ischemia. - 22 TTE: LVEF 55%. Diastolic function is mildly abnormal, consistent with grade I (abnormal) relaxation. Right Ventricle: Systolic function is low normal.  - Will repeat TTE.  - [id number] stroke risk score: 3 points, moderate-high risk. - Will start Eliquis 5 mg twice daily. Discussed with patient. - Monitor electrolytes. Replete potassium above 4 and magnesium above 2.  - Continue telemetry. 2. Coronary artery disease.     - Presented on 2023 for evaluation for chest pain. Was found to be in atrial flutter with RVR. Was given Cardizem. ED physician notes indicate the patient was alternating between atrial flutter and sinus rhythm. - s/p CABG x3 in .    - s/p PCI to LAD in .   - 22 nuclear stress test: Stress Combined Conclusion - the ECG and SPECT imaging portions of the stress study are concordant with no evidence of stress induced myocardial ischemia. - 11/25/22 TTE: LVEF 55%. Diastolic function is mildly abnormal, consistent with grade I (abnormal) relaxation. Right Ventricle: Systolic function is low normal.  - Currently on aspirin 81 mg daily pravastatin 40 mg daily. - Currently on Ranexa 500 mg twice daily. - Was on Coreg 12.5 mg twice daily. Will switch to lopressor 12.5 mg twice daily and titrate up as tolerated. - Review of home medications notes patient is on norvasc 5 mg daily, currently on hold per primary team.   - Currently on lisinopril 10 mg daily     3. Hypertension.     - SBP since admission . - Was on Coreg 12.5 mg twice daily. Will switch to lopressor 12.5 mg twice daily and titrate up as tolerated. - Review of home medications notes patient is on norvasc 5 mg daily, currently on hold per primary team.   - Currently on lisinopril 10 mg daily     4. Type II DM.     - 9/20/23 HgbA1c: 6.1.   - Care per primary team.     5. Obstructive sleep apnea. - Patient states he was diagnosed with SUNITA in the past. Unable to afford CPAP. States he is trying to get approval for CPAP as he has new insurance. - Discussed the importance of treating SUNITA in setting of pafib/aflutter. 6. Alcohol abuse. - States that he had previously drank excessively. Quit drinking about 2 months ago. 7. Tobacco abuse. - Patient reports he continues to smoke approximately 1 ppd/day. - Currently on chantix and nicotine patch.   - Patient states he wants to try to stop smoking. Summary of Recommendations: Thank you for your consultation. Physician Requesting Consult: Audrey Orellana DO    Reason for Consult / Principal Problem: paroxysmal atrial flutter.      Inpatient consult to Cardiology  Consult performed by: Erik Call PA-C  Consult ordered by: CHELSEA Augustin        HPI: Solomon Lira is a 66y.o. year old male with PMHx paroxysmal atrial fibrillation (2020-in setting of severe hypokalemia and hypomagnesemia secondary to alcohol abuse), CAD s/p CABG x3 (2004), s/p PCI of LAD (2014), hypertension, type II diabetes, SUNITA (not on CPAP), ETOH abuse, and tobacco abuse presented on 9/20/2023 for evaluation for chest pain. Was found to be in atrial flutter with RVR. Patient reports that he has been experiencing chest pain since 9/17/23. Describes the chest pain as a persistent pain localized to the center of his chest, worse with lying down. He states that chest pain was associated with mild lightheadedness but denies experiencing shortness of breath, palpitations, nausea, or vomiting. Review of chart indicates that patient had new onset atrial fibrillation in 2020 in setting of severe hypokalemia and hypomagnesia him secondary to alcohol abuse. On anticoagulation outpatient. Patient states that he has known history of obstructive sleep apnea but has not used a CPAP he said he was unable to afford it in the past.    Review of Systems   Constitutional: Negative for activity change, appetite change, diaphoresis, fatigue and unexpected weight change. Respiratory: Positive for chest tightness. Negative for cough, shortness of breath and wheezing. Cardiovascular: Positive for chest pain. Negative for palpitations and leg swelling. Gastrointestinal: Negative for abdominal distention, abdominal pain, constipation, diarrhea, nausea and vomiting. Neurological: Positive for light-headedness. Negative for dizziness, syncope, weakness and headaches.        Historical Information   Past Medical History:   Diagnosis Date   • Acute on chronic kidney failure (HCC)    • Alcohol withdrawal ([address]) 06/07/2019   • Atrial fibrillation (HCC)    • Cancer (HCC)     prostate ca,had radiation   • Cardiac disease     stents,then triple bypass   • COPD (chronic obstructive pulmonary disease) (HCC)    • ETOH abuse    • Hx of heart artery stent     2014   • Hyperlipidemia    • Hypertension    • Hypovolemic shock ([address]) 12/22/2019   • Lumbar spondylitis ([address]) 10/13/2022   • Nasal bone fracture 10/10/2022   • Prostate CA ([address])    • S/P CABG x 3     2004   • Sleep apnea      Past Surgical History:   Procedure Laterality Date   • CARDIAC CATHETERIZATION      2 stents   • CORONARY ARTERY BYPASS GRAFT  2004   • NC ARTHRD ANT INTERBODY MIN [address] CRV BELOW C2 N/A 12/16/2020    Procedure: Anterior cervical discectomy with fusion C4-C7;  Posterior cervical decompression and fusion C2-T2;  Surgeon: Dana Cantrell MD;  Location: [facility];  Service: Neurosurgery   • TONSILLECTOMY       Social History     Substance and Sexual Activity   Alcohol Use Not Currently   • Alcohol/week: 4.0 standard drinks of alcohol   • Types: 4 Standard drinks or equivalent per week     Social History     Substance and Sexual Activity   Drug Use No     Social History     Tobacco Use   Smoking Status Every Day   • Packs/day: 1.50   • Years: 40.00   • Total pack years: 60.00   • Types: Cigarettes   Smokeless Tobacco Never     Family History:   Family History   Problem Relation Age of Onset   • Diabetes Mother    • Uterine cancer Mother    • COPD Father    • Hypertension Father        Meds/Allergies    PTA meds:    Medications Prior to Admission   Medication   • amLODIPine (NORVASC) 5 mg tablet   • aspirin (ECOTRIN LOW STRENGTH) 81 mg EC tablet   • carvedilol (COREG) 12.5 mg tablet   • esomeprazole (NexIUM) 20 mg capsule   • ferrous sulfate 325 (65 Fe) mg tablet   • folic acid (FOLVITE) 1 mg tablet   • gabapentin (NEURONTIN) 300 mg capsule   • lisinopril (ZESTRIL) 20 mg tablet   • magnesium Oxide (MAG-OX) 400 mg TABS   • metFORMIN (GLUCOPHAGE) 500 mg tablet   • naltrexone (REVIA) 50 mg tablet   • pravastatin (PRAVACHOL) 40 mg tablet   • ranolazine (RANEXA) 500 mg 12 hr tablet   • tamsulosin (FLOMAX) 0.4 mg   • Thiamine HCl (vitamin B-1) 100 MG TABS   • varenicline (CHANTIX) 1 mg tablet   • albuterol (2.5 mg/3 mL) 0.083 % nebulizer solution   • albuterol (Ventolin HFA) 90 mcg/act inhaler   • Blood Glucose Monitoring Suppl (ONE TOUCH ULTRA MINI) w/Device KIT   • Breo Ellipta 200-25 MCG/ACT inhaler   • ONETOUCH DELICA LANCETS FINE MISC   • varenicline 0.5 MG X 11 & 1 MG X 42 tablet therapy pack      No Known Allergies    Current Facility-Administered Medications:   •  acetaminophen (TYLENOL) tablet 650 mg, 650 mg, Oral, Q6H PRN, CHELSEA Fisher, 650 mg at 09/21/23 0304  •  aluminum-magnesium hydroxide-simethicone (MAALOX) oral suspension 30 mL, 30 mL, Oral, Q4H PRN, CHELSEA Fisher, 30 mL at 09/21/23 0304  •  aspirin (ECOTRIN LOW STRENGTH) EC tablet 81 mg, 81 mg, Oral, Daily, CHELSEA Fisher, 81 mg at 09/21/23 2255  •  enoxaparin (LOVENOX) subcutaneous injection 40 mg, 40 mg, Subcutaneous, Q24H SEDA, CHELSEA Fisher, 40 mg at 09/21/23 1406  •  ferrous sulfate tablet 325 mg, 325 mg, Oral, Daily With Breakfast, CHELSEA Fisher, 325 mg at 09/21/23 0801  •  fluticasone-vilanterol 200-25 mcg/actuation 1 puff, 1 puff, Inhalation, Daily, CHELSEA Fisher, 1 puff at 55/38/15 3697  •  folic acid (FOLVITE) tablet 1,000 mcg, 1,000 mcg, Oral, Daily, CHELSEA Fisher, 1,000 mcg at 09/21/23 1101  •  furosemide (LASIX) injection 20 mg, 20 mg, Intravenous, Once, Ramona Seals PA-C  •  gabapentin (NEURONTIN) capsule 300 mg, 300 mg, Oral, TID, CHELSEA Fisher  •  insulin lispro (HumaLOG) 100 units/mL subcutaneous injection 1-5 Units, 1-5 Units, Subcutaneous, Q6H [address], 2 Units at 09/21/23 1144 **AND** Fingerstick Glucose (POCT), , , Q6H, CHELSEA Fisher  •  levalbuterol (XOPENEX) inhalation solution 0.63 mg, 0.63 mg, Nebulization, Q4H PRN, CHELSEA Fisher  •  lisinopril (ZESTRIL) tablet 10 mg, 10 mg, Oral, Daily, CHELSEA Fisher  •  magnesium Oxide (MAG-OX) tablet 400 mg, 400 mg, Oral, BID, CHELSEA Fisher  •  melatonin tablet 3 mg, 3 mg, Oral, HS, CHELSEA Fisher  •  metoprolol tartrate (LOPRESSOR) partial tablet 12.5 mg, 12.5 mg, Oral, Q12H [address], Bonita De La Torre PA-C  •  morphine injection 2 mg, 2 mg, Intravenous, Q4H PRN, Rosanne Andraderik, CRNP, 2 mg at 09/21/23 1406  •  naltrexone (REVIA) tablet 50 mg, 50 mg, Oral, Daily, Cuiguy Andraderik, CRNP, 50 mg at 09/21/23 0820  •  nicotine (NICODERM CQ) 21 mg/24 hr TD 24 hr patch 1 patch, 1 patch, Transdermal, Daily, Rosanne Andraderik, CRNP, 1 patch at 09/21/23 0805  •  ondansetron (ZOFRAN) injection 4 mg, 4 mg, Intravenous, Q6H PRN, Rosanne Andraderik, CRNP  •  pantoprazole (PROTONIX) EC tablet 40 mg, 40 mg, Oral, Early Morning, Rosanne Mehta, CRNP, 40 mg at 09/21/23 3146  •  pravastatin (PRAVACHOL) tablet 40 mg, 40 mg, Oral, Daily With Dinner, Rosanne Andraderik, CRNP  •  ranolazine (RANEXA) 12 hr tablet 500 mg, 500 mg, Oral, Q12H [address], Rosanne Mehta, CRNP, 500 mg at 09/21/23 1005  •  sodium chloride 0.9 % infusion, 50 mL/hr, Intravenous, Continuous, CHELSEA Fisher, Last Rate: 50 mL/hr at 09/21/23 0229, 50 mL/hr at 09/21/23 0229  •  tamsulosin (FLOMAX) capsule 0.4 mg, 0.4 mg, Oral, Daily With Dinner, Rosanne Mehta, CRNP  •  thiamine tablet 100 mg, 100 mg, Oral, Daily, Rosanne Andraderik, CRNP, 100 mg at 09/21/23 8285  •  varenicline (CHANTIX) tablet 1 mg, 1 mg, Oral, BID, Rosanne Andraderik, CRNP    VTE Pharmacologic Prophylaxis:   Eliquis    Objective:   Vitals: Blood pressure 123/66, pulse 86, temperature 97.8 °F (36.6 °C), resp. rate 16, height 5' 11" (1.803 m), weight 86.6 kg (191 lb), SpO2 95 %. Body mass index is 26.64 kg/m².   Wt Readings from Last 3 Encounters:   09/21/23 86.6 kg (191 lb)   06/13/23 86.9 kg (191 lb 8 oz)   06/09/23 97.7 kg (215 lb 4.8 oz)     BP Readings from Last 3 Encounters:   09/21/23 123/66   06/13/23 122/54   06/09/23 130/86     Orthostatic Blood Pressures    Flowsheet Row Most Recent Value   Blood Pressure 123/66 filed at 09/21/2023 0800   Patient Position - Orthostatic VS Lying filed at 09/21/2023 0409          Intake/Output Summary (Last 24 hours) at 9/21/2023 1519  Last data filed at 9/21/2023 0401  Gross per 24 hour   Intake --   Output 400 ml   Net -400 ml       Invasive Devices     Peripheral Intravenous Line  Duration           Peripheral IV 09/20/23 Left Antecubital <1 day                  Physical Exam:   Physical Exam  Vitals reviewed. Constitutional:       General: He is not in acute distress. Cardiovascular:      Rate and Rhythm: Normal rate and regular rhythm. Pulses: Normal pulses. Heart sounds: Murmur heard. Pulmonary:      Effort: Pulmonary effort is normal. No respiratory distress. Breath sounds: Decreased breath sounds present. Abdominal:      General: Abdomen is flat. There is no distension. Palpations: Abdomen is soft. Tenderness: There is no abdominal tenderness. Musculoskeletal:      Right lower leg: No edema. Left lower leg: No edema. Skin:     General: Skin is warm and dry. Neurological:      Mental Status: He is alert and oriented to person, place, and time.        Labs:   Troponins:  Results from last 7 days   Lab Units 09/21/23  1011 09/20/23  2226   HS TNI RAND ng/L 12  --    HSTNI D2 ng/L  --  -4       CBC with diff:   Results from last 7 days   Lab Units 09/20/23 2013   WBC Thousand/uL 8.83   HEMOGLOBIN g/dL 14.4   HEMATOCRIT % 40.8   MCV fL 98   PLATELETS Thousands/uL 205   RBC Million/uL 4.18   MCH pg 34.4*   MCHC g/dL 35.3   RDW % 13.5   MPV fL 10.5   NRBC AUTO /100 WBCs 0       CMP:   Results from last 7 days   Lab Units 09/21/23  0844 09/20/23 2013   SODIUM mmol/L 131* 129*   POTASSIUM mmol/L 4.8 5.2   CHLORIDE mmol/L 101 96   CO2 mmol/L 25 24   ANION GAP mmol/L 5 9   BUN mg/dL 21 22   CREATININE mg/dL 1.07 1.04   GLUCOSE FASTING mg/dL 133*  --    CALCIUM mg/dL 9.3 9.6   AST U/L  --  35   ALT U/L  --  28   ALK PHOS U/L  --  51   TOTAL PROTEIN g/dL  --  7.8   ALBUMIN g/dL  --  4.2   TOTAL BILIRUBIN mg/dL  --  0.42   EGFR ml/min/1.73sq m 74 77   GLUCOSE RANDOM mg/dL 133 89 Magnesium:   Results from last 7 days   Lab Units 09/21/23  0844 09/20/23 2013   MAGNESIUM mg/dL 2.0 1.9     Coags:   Results from last 7 days   Lab Units 09/20/23 2013   PTT seconds 28   INR  0.94     TSH:    Results from last 7 days   Lab Units 09/21/23 0844   TSH 3RD GENERATON uIU/mL 0.293*     No components found for: "TSH3"  Lipid Profile:     Lipid Profile:   Lab Results   Component Value Date    CHOLESTEROL 190 06/16/2023    HDL 60 06/16/2023    LDLCALC 116 (H) 06/16/2023    TRIG 71 06/16/2023     Hgb A1c:   Results from last 7 days   Lab Units 09/20/23 2013   HEMOGLOBIN A1C % 6.1*     NT-proBNP: No results for input(s): "NTBNP" in the last 72 hours. Imaging & Testing     Cardiac testing:     TTE  Result date: 11/25/22       Left Ventricle Left ventricular cavity size is normal. Wall thickness is moderately increased. There is moderate to severe concentric hypertrophy. The left ventricular ejection fraction is 55% by biplane measurement. Systolic function is normal.  There is dyskinesis of the septum. Diastolic function is mildly abnormal, consistent with grade I (abnormal) relaxation. Right Ventricle Right ventricular cavity size is normal. Systolic function is low normal. Wall thickness is normal.      Left Atrium The atrium is severely dilated (>48 mL/m2). Right Atrium The atrium is normal in size. Aortic Valve The aortic valve is trileaflet. The leaflets are not thickened. The leaflets are not calcified. The leaflets exhibit normal mobility. There is no evidence of regurgitation. The aortic valve has no significant stenosis. Mitral Valve There is no evidence of regurgitation. There is no evidence of stenosis. The mitral valve has normal structure and normal function. Tricuspid Valve Tricuspid valve structure is normal. There is trace regurgitation. There is no evidence of stenosis. Right ventricular systolic pressure could not be assessed.       Pulmonic Valve Pulmonic valve structure is normal. There is no evidence of regurgitation. There is no evidence of stenosis. Ascending Aorta The aortic root is normal in size. IVC/SVC The inferior vena cava is normal in size. Pericardium There is no pericardial effusion. The pericardium is normal in appearance. NM Myocardial Perfusion Spect (Pharmacological Induced Stress and/or Rest)  Result date: 22     Interpretation Summary  •  Stress ECG: No ST deviation is noted. The ECG was not diagnostic due to pharmacological (vasodilator) stress. •  Perfusion: There are no perfusion defects. •  Stress Function: Left ventricular function post-stress is normal. Post-stress ejection fraction is 44 %. •  Stress Combined Conclusion: The ECG and SPECT imaging portions of the stress study are concordant with no evidence of stress induced myocardial ischemia. Results for orders placed during the hospital encounter of 20    Echo complete with contrast if indicated    [address]  [phone]    Transthoracic Echocardiogram  2D, M-mode, Doppler, and Color Doppler    Study date:  05-Dec-2020    Patient: Vanessa Zaidi  MR number: [id number]  Account number: [de-identified]  : 1960  Age: 64 years  Gender: Male  Status: Inpatient  Location: Bedside  Height: 70 in  Weight: 159.7 lb  BP: 124/ 76 mmHg    Indications: A-Fib    Diagnoses: I48.0 - Atrial fibrillation    Sonographer:  Karla Ellington RDCS  Primary Physician:  Myrtle Francisco  Referring Physician:  Lizz Salvador MD  Ordering Physician:  Lizz Salvador MD  Interpreting Physician:  DO SHEYLA Valenzuela    LEFT VENTRICLE:  Systolic function was normal by visual assessment. Ejection fraction was estimated in the range of 55 % to 60 %. There were no regional wall motion abnormalities. There was moderate concentric hypertrophy.   Doppler parameters were consistent with abnormal left ventricular relaxation (grade 1 diastolic dysfunction). LEFT ATRIUM:  The atrium was moderately dilated. MITRAL VALVE:  There was mild regurgitation. AORTIC VALVE:  There was no evidence for stenosis. There was no regurgitation. TRICUSPID VALVE:  There was mild regurgitation. Pulmonary artery systolic pressure was mildly to moderately increased. Estimated peak PA pressure was 55 mmHg. COMPARISONS:  There has been no significant interval change. Comparison was made with the previous study of 23-Dec-2019. HISTORY: PRIOR HISTORY: Alcohol Abuse, DM, HTN, HLD, CAD, COPD    PROCEDURE: The procedure was performed at the bedside. This was a routine study. The transthoracic approach was used. The study included complete 2D imaging, M-mode, complete spectral Doppler, and color Doppler. The heart rate was 91 bpm,  at the start of the study. Image quality was adequate. LEFT VENTRICLE: Size was normal. Systolic function was normal by visual assessment. Ejection fraction was estimated in the range of 55 % to 60 %. There were no regional wall motion abnormalities. There was moderate concentric hypertrophy. DOPPLER: Doppler parameters were consistent with abnormal left ventricular relaxation (grade 1 diastolic dysfunction). RIGHT VENTRICLE: The size was normal. Systolic function was normal. DOPPLER: Systolic pressure was within the normal range. LEFT ATRIUM: The atrium was moderately dilated. RIGHT ATRIUM: The atrium was mildly dilated. MITRAL VALVE: Valve structure was normal. There was normal leaflet separation. No echocardiographic evidence for prolapse. DOPPLER: The transmitral velocity was within the normal range. There was no evidence for stenosis. There was mild  regurgitation. AORTIC VALVE: The valve was trileaflet. Leaflets exhibited normal thickness and normal cuspal separation. DOPPLER: Transaortic velocity was within the normal range.  There was no evidence for stenosis. There was no regurgitation. TRICUSPID VALVE: The valve structure was normal. There was normal leaflet separation. DOPPLER: The transtricuspid velocity was within the normal range. There was mild regurgitation. Pulmonary artery systolic pressure was mildly to  moderately increased. Estimated peak PA pressure was 55 mmHg. PULMONIC VALVE: Leaflets exhibited normal thickness, no calcification, and normal cuspal separation. DOPPLER: The transpulmonic velocity was within the normal range. There was no regurgitation. PERICARDIUM: There was no thickening. There was no pericardial effusion. AORTA: The root exhibited normal size. PULMONARY ARTERY: The size was normal. The morphology appeared normal.    SYSTEM MEASUREMENT TABLES    2D  EF (Teich): 59.98 %    PW  MV E/A Ratio: 3.5    [employer] Accredited Echocardiography Laboratory    Prepared and electronically signed by    Alyssa Huitron DO  Signed 07-Dec-2020 10:55:08      Imaging: I have personally reviewed pertinent reports. CTA ED chest PE study    Result Date: 9/20/2023    Impression: 1. No evidence for acute pulmonary embolism or other acute intrathoracic abnormality. 2.       EKG/ Monitor: Personally reviewed. 9/20/23 EKG: atrial flutter, 142 bpm. ST and T wave abnormalities in inferior leads.      Telemetry reviewed: currently in sinus rhythm, 80 bpm.     Code Status: Level 1 - Full Code  Advance Directive and Living Will:      POLST:        Marcia Marcano PA-C

## 2023-09-21 NOTE — ASSESSMENT & PLAN NOTE
· Could not afford CPAP in the past.   · Patient thinks he needs CPAP. Recommend sleep study as outpatient to qualify for a new machine.

## 2023-09-21 NOTE — H&P
[address]  H&P  Name: Evgeny Sewell 66 y.o. male I MRN: [id number]  Unit/Bed#: 2 [address] Date of Admission: 9/20/2023   Date of Service: 9/21/2023 I Hospital Day: 0      Assessment/Plan   * Chest pain  Assessment & Plan  Patient presents with localized intermittent chest pain since Saturday, reports chest pain worse when laying down at night, with associated upset stomach. Patient denies SOB to me. · History of CAD, status post triple bypass in 2004, PCI with stents x2 in 2014. · History of paroxysmal A-fib briefly postoperatively   · History of GERD, on PPI  · Nuclear stress test in November last year was negative  · 2D echo in November last year showed EF 80%, grade 1 diastolic dysfunction, no significant valve disease  · EKG in ED showed uncontrolled A-fib versus sinus tach, T wave inversion in III and aVF  · Patient was in normal sinus rhythm and tachycardia in ED. · Troponins unremarkable  · Cardiac chest pain versus GI source of pain  · Trend troponin  · Telemetry  · N.p.o. for midnight  · Consult GI and cardiology    Paroxysmal atrial fibrillation with rapid ventricular response ([address])  Assessment & Plan  · ? A-fib with RVR. · Continue Coreg  · Optimize electrolytes  · Telemetry  · Consult cardiology    Elevated d-dimer  Assessment & Plan  · D-dimer 1.0  · CTA chest PE study no PE  · Defer AC to cardiology    Hyponatremia  Assessment & Plan  · Sodium 129. History of hyponatremia , likely related to alcohol abuse. · Check urine sodium, urine osmole, serum osmole, uric acid, TSH, free T4  · Gentle IV hydration  · Repeat lab in the morning    Sleep apnea  Assessment & Plan  · Could not afford CPAP in the past.   · Patient thinks he needs CPAP. Recommend sleep study as outpatient to qualify for a new machine. GERD (gastroesophageal reflux disease)  Assessment & Plan  · Continue PPI    Alcohol abuse  Assessment & Plan  · History of alcohol use.   Quit 2 months ago.  · Continue ReVia, folic acid MVI, thiamine    Nicotine abuse  Assessment & Plan  · Nicotine patch, smoking cessation    CAD (coronary artery disease)  Assessment & Plan  · As above  · Continue aspirin, Coreg, Ranexa, statin    History of prostate cancer  Assessment & Plan  · Noted history    Essential hypertension  Assessment & Plan  · On Norvasc Coreg, lisinopril at home  · Hold Norvasc  · Continue Coreg  · Decrease lisinopril to 10 mg p.o. daily. · BP labile  · Monitor    Type 2 diabetes mellitus [facility])  Assessment & Plan  Lab Results   Component Value Date    HGBA1C 5.8 (H) 06/16/2023       Recent Labs     09/21/23  0233   POCGLU 194*       Blood Sugar Average: Last 72 hrs:  (P) 194     · On metformin at home. Will hold while inpatient  · SSI  · Update A1c    COPD (chronic obstructive pulmonary disease) ([address])  Assessment & Plan  · Patient was wheezing on ED arriva  · Received IV Solu-Medrol  · No wheezing on auscultation  · Not taking Breo regularly at home. Will order daily. · Xopenex as needed  · Hold off on steroids            VTE Prophylaxis: Enoxaparin (Lovenox)  / reason for no mechanical VTE prophylaxis on lovenox   Code Status: full Code  POLST: POLST form is not discussed and not completed at this time. Anticipated Length of Stay:  Patient will be admitted on an Observation basis with an anticipated length of stay of  < 2 midnights. Justification for Hospital Stay: CP, uncontrolled A-fib    Total Time for Visit, including Counseling / Coordination of Care: 45 minutes. Greater than 50% of this total time spent on direct patient counseling and coordination of care.     Chief Complaint:   Chest pain    History of Present Illness:    Angel Vo is a 66 y.o. male with PMH of CAD, paroxysmal A-fib, hypertension, COPD, alcohol abuse, nicotine abuse, type 2 diabetes, prostate cancer, hyponatremia who presents with  localized intermittent chest pain since Saturday, reports chest pain worse when laying down at night, with associated upset stomach. Patient denies SOB to me. Denies palpitation. Denies vomiting. Patient denies chest pain currently. Reports history of sleep apnea, could not afford CPAP,thinks he needs CPAP. Patient offered no other complaints. Review of Systems:    Review of Systems   Cardiovascular: Positive for chest pain. Gastrointestinal:        Upset stomach   All other systems reviewed and are negative. Past Medical and Surgical History:     Past Medical History:   Diagnosis Date   • Acute on chronic kidney failure ([address])    • Alcohol withdrawal ([address]) 06/07/2019   • Atrial fibrillation (HCC)    • Cancer (HCC)     prostate ca,had radiation   • Cardiac disease     stents,then triple bypass   • COPD (chronic obstructive pulmonary disease) ([address])    • ETOH abuse    • Hx of heart artery stent     2014   • Hyperlipidemia    • Hypertension    • Hypovolemic shock ([address]) 12/22/2019   • Lumbar spondylitis ([address]) 10/13/2022   • Nasal bone fracture 10/10/2022   • Prostate CA ([address])    • S/P CABG x 3     2004   • Sleep apnea        Past Surgical History:   Procedure Laterality Date   • CARDIAC CATHETERIZATION      2 stents   • CORONARY ARTERY BYPASS GRAFT  2004   • GA ARTHRD ANT INTERBODY MIN [address] CRV BELOW C2 N/A 12/16/2020    Procedure: Anterior cervical discectomy with fusion C4-C7; Posterior cervical decompression and fusion C2-T2;  Surgeon: Kam Taylor MD;  Location: [facility];  Service: Neurosurgery   • TONSILLECTOMY         Meds/Allergies:    Prior to Admission medications    Medication Sig Start Date End Date Taking?  Authorizing Provider   amLODIPine (NORVASC) 5 mg tablet TAKE 1 TABLET DAILY 9/1/23  Yes Clary Joseph MD   aspirin (ECOTRIN LOW STRENGTH) 81 mg EC tablet Take 81 mg by mouth daily   Yes Historical Provider, MD   carvedilol (COREG) 12.5 mg tablet Take 1 tablet (12.5 mg total) by mouth 2 (two) times a day with meals 2/10/23  Yes Mira Duncan MD   esomeprazole (NexIUM) 20 mg capsule Take 20 mg by mouth daily in the early morning   Yes Dinora Wick MD   ferrous sulfate 325 (65 Fe) mg tablet Take 1 tablet (325 mg total) by mouth daily with breakfast 6/2/23  Yes Beverly Cedeno MD   folic acid (FOLVITE) 1 mg tablet TAKE 1 TABLET DAILY 1/23/23  Yes Dick Mendez MD   gabapentin (NEURONTIN) 300 mg capsule TAKE ONE CAPSULE THREE TIMES DAILY 9/19/23  Yes Mejia Jasso MD   lisinopril (ZESTRIL) 20 mg tablet Take 1 tablet (20 mg total) by mouth daily 6/20/23  Yes Mejia Jasso MD   magnesium Oxide (MAG-OX) 400 mg TABS TAKE 1 TABLET TWO TIMES A DAY 5/30/23  Yes Aleah Sanchez DO   metFORMIN (GLUCOPHAGE) 500 mg tablet TAKE 1 TABLET DAILY WITH BREAKFAST 5/9/23  Yes Dick Mendez MD   naltrexone (REVIA) 50 mg tablet Take 1 tablet (50 mg total) by mouth daily Do not start before May 14, 2023. 5/14/23  Yes Dick Mendez MD   pravastatin (PRAVACHOL) 40 mg tablet TAKE 1 TABLET DAILY WITH DINNER 2/15/23  Yes Dick Mendez MD   ranolazine (RANEXA) 500 mg 12 hr tablet TAKE 1 TABLET EVERY 12 HOURS 8/17/23  Yes Mejia Jasso MD   tamsulosin (FLOMAX) 0.4 mg TAKE 1 CAPSULE DAILY 9/17/23  Yes Mejia Jasso MD   Thiamine HCl (vitamin B-1) 100 MG TABS TAKE 1 TABLET DAILY 12/15/22  Yes Dick Mendez MD   varenicline (CHANTIX) 1 mg tablet TAKE 1 TABLET 2 TIMES A DAY 9/6/23  Yes Mejia Jasso MD   albuterol (2.5 mg/3 mL) 0.083 % nebulizer solution Take 3 mL (2.5 mg total) by nebulization every 6 (six) hours as needed for wheezing or shortness of breath 1/17/23   Mary Erwin,    albuterol (Ventolin HFA) 90 mcg/act inhaler Inhale 2 puffs every 4 (four) hours as needed for wheezing 1/17/23   Emma Erwin DO   Blood Glucose Monitoring Suppl (ONE TOUCH ULTRA MINI) w/Device KIT Use as directed 5/16/19   Historical Provider, MD Pat Brewster 200-25 MCG/ACT inhaler Inhale 1 puff daily Rinse mouth after use.  6/9/23 Melonie Jacinto MD   [facility] DELICA LANCETS FINE MISC 3 (three) times a day Test 5/16/19   Historical Provider, MD   varenicline 0.5 MG X 11 & 1 MG X 42 tablet therapy pack Take 0.5 mg by mouth daily for 3 days, THEN 0.5 mg 2 (two) times a day for 4 days, THEN 1 mg 2 (two) times a day for 21 days. Patient not taking: Reported on 6/13/2023 6/9/23 7/7/23  Melonie Jacinto MD   pantoprazole (PROTONIX) 40 mg tablet Take 1 tablet (40 mg total) by mouth daily in the early morning Do not start before November 26, 2022. Patient not taking: Reported on 9/21/2023 11/26/22 9/21/23  Anderson Montez MD     I have reviewed home medications with patient personally. Allergies: No Known Allergies    Social History:     Marital Status: Single   Occupation: Disabled  Patient Pre-hospital Living Situation: Unclear  Patient Pre-hospital Level of Mobility: Independent  Patient Pre-hospital Diet Restrictions: Diabetic cardiac  Substance Use History:   Social History     Substance and Sexual Activity   Alcohol Use Not Currently   • Alcohol/week: 4.0 standard drinks of alcohol   • Types: 4 Standard drinks or equivalent per week     Social History     Tobacco Use   Smoking Status Every Day   • Packs/day: 1.50   • Years: 40.00   • Total pack years: 60.00   • Types: Cigarettes   Smokeless Tobacco Never     Social History     Substance and Sexual Activity   Drug Use No       Family History:    non-contributory    Physical Exam:     Vitals:   Blood Pressure: 123/66 (09/21/23 0800)  Pulse: 86 (09/21/23 0800)  Temperature: 97.8 °F (36.6 °C) (09/21/23 0800)  Temp Source: Oral (09/21/23 0207)  Respirations: 16 (09/21/23 0800)  Height: 5' 11" (180.3 cm) (09/21/23 0304)  Weight - Scale: 86.6 kg (191 lb) (09/21/23 0304)  SpO2: 95 % (09/21/23 0800)    Physical Exam  Vitals and nursing note reviewed. Constitutional:       Appearance: He is well-developed. He is obese. HENT:      Head: Normocephalic and atraumatic.    Neck: Thyroid: No thyromegaly. Vascular: No JVD. Trachea: No tracheal deviation. Cardiovascular:      Rate and Rhythm: Normal rate and regular rhythm. Heart sounds: Normal heart sounds. Comments: Sinus rhythm 70s during exam  Pulmonary:      Effort: No respiratory distress. Breath sounds: No wheezing or rales. Comments: Breath sounds clear diminished bilateral, on room air, respiration easy  Abdominal:      General: Bowel sounds are normal. There is no distension. Palpations: Abdomen is soft. Tenderness: There is no abdominal tenderness. There is no guarding. Musculoskeletal:         General: No swelling or deformity. Cervical back: Neck supple. Right lower leg: No edema. Left lower leg: No edema. Skin:     General: Skin is warm and dry. Neurological:      General: No focal deficit present. Mental Status: He is alert and oriented to person, place, and time. Psychiatric:         Mood and Affect: Mood normal.         Judgment: Judgment normal.         Additional Data:     Lab Results: I have personally reviewed pertinent reports. Results from last 7 days   Lab Units 09/20/23 2013   WBC Thousand/uL 8.83   HEMOGLOBIN g/dL 14.4   HEMATOCRIT % 40.8   PLATELETS Thousands/uL 205   NEUTROS PCT % 55   LYMPHS PCT % 23   MONOS PCT % 14*   EOS PCT % 6     Results from last 7 days   Lab Units 09/21/23  0844 09/20/23 2013   POTASSIUM mmol/L 4.8 5.2   CHLORIDE mmol/L 101 96   CO2 mmol/L 25 24   BUN mg/dL 21 22   CREATININE mg/dL 1.07 1.04   CALCIUM mg/dL 9.3 9.6   ALK PHOS U/L  --  51   ALT U/L  --  28   AST U/L  --  35     Results from last 7 days   Lab Units 09/20/23 2013   INR  0.94       Imaging: I have personally reviewed pertinent reports. CTA ED chest PE study    Result Date: 9/20/2023  Narrative: CTA - CHEST WITH IV CONTRAST - PULMONARY ANGIOGRAM INDICATION:   sob, chest tightness, tachycardia, elevated Ddimer.  COMPARISON: CT chest abdomen pelvis 3/11/2022 TECHNIQUE: CTA examination of the chest was performed using angiographic technique according to a protocol specifically tailored to evaluate for pulmonary embolism. Multiplanar 2D reformatted images were created from the source data. In addition, coronal 3D MIP postprocessing was performed on the acquisition scanner. Radiation dose length product (DLP) for this visit:  627.51 mGy-cm . This examination, like all CT scans performed in the [facility], was performed utilizing techniques to minimize radiation dose exposure, including the use of iterative  reconstruction and automated exposure control. IV Contrast:  85 mL of iohexol (OMNIPAQUE) FINDINGS: PULMONARY ARTERIAL TREE: There is adequate opacification of the pulmonary arterial vasculature with no filling defects identified to indicate acute pulmonary emboli. Central pulmonary arteries are of normal caliber. LUNGS:  Lungs are clear. There is no tracheal or endobronchial lesion. PLEURA:  Unremarkable. HEART/GREAT VESSELS: The heart is mildly enlarged. Coronary artery calcification is present. RV/LV ratio normal at 0.9. No thoracic aortic aneurysm. MEDIASTINUM AND DENZEL:  Unremarkable. CHEST WALL AND LOWER NECK:   Post median sternotomy. VISUALIZED STRUCTURES IN THE UPPER ABDOMEN: Partially imaged adrenal glands demonstrate nodularity, previously described and likely reflecting nodular adrenal hyperplasia. OSSEOUS STRUCTURES: Degenerative change present in the spine. Inferior aspect of posterior instrumented fusion hardware noted extending to the C2 level. Impression: 1. No evidence for acute pulmonary embolism or other acute intrathoracic abnormality. 2. Additional findings as noted.  Workstation performed: [id number]       EKG, Pathology, and Other Studies Reviewed on Admission:   · EKG: ST versus A fib with RVR    [employer] Records Reviewed: Yes     ** Please Note: Dragon 360 Dictation voice to text software may have been used in the creation of this document.  **

## 2023-09-21 NOTE — ASSESSMENT & PLAN NOTE
"Patient ID: Nestor Youssef is a 37 y.o. female    SERVICE TYPE: EVALUATION AND MANAGEMENT (greater than 50% of the time spent for supportive psychotherapy).      Visit Vitals   • /71   • Pulse 71   • Ht 64\" (162.6 cm)   • Wt 218 lb (98.9 kg)   • BMI 37.42 kg/m2       ALLERGIES:  Review of patient's allergies indicates no known allergies.    CC: \"Doing better\".     FOLLOWED FOR:    HPI: Reports that her daughter is better, not cutting, in therapy with school counselor and has appointment here later this month.   Patient reports she is better herself, \"not crying as much\". Less distressed \"but still isolate\". Attending [employer], they have been supportive. Stays in her BR, the BF does the household chores, patient cooks for the family. No longer feeling hopeless, no cutting, interest and activity at her baseline. Reads and study the bible \"my hobby\".     PFSH: BF supportive and helpful \"but gets on my nerves so bad\". [employer] has been a blessing for her and her daughter.     Review of Systems   Respiratory: Negative.    Cardiovascular: Negative.    Gastrointestinal: Negative.    Musculoskeletal: Positive for myalgias.   Right leg.       SUPPORTIVE PSYCHOTHERAPY: continuing efforts to promote the therapeutic alliance, address the patient’s issues, and strengthen self awareness, insights, and coping skills.       Mental Status Exam  Appearance:  clean and casually dressed, appropriate  Attitude toward clinician:  cooperative and agreeable   Speech:    Rate:  regular rate and rhythm   Volume: normal  Motor:  no abnormal movements present  Mood:  Good  Affect:  Definitely more positive.   Thought Processes:  linear, logical, and goal directed  Thought Content:  Normal   Feeling Hopeless: absent  Suicidal Thoughts:  absent  Homicidal Thoughts:  absent  Perceptual Disturbance: no perceptual disturbance  Attention and Concentration:  good  Insight and Judgement:  good  Memory:  memory appears to be intact    LABS:   Lab " · Nicotine patch, smoking cessation. Results (last 7 days)     ** No results found for the last 168 hours. **          MEDICATION ISSUES: Have discussed with the patient the medications Risks, Benefits, and Side effects including potential falls, possible impaired driving and  metabolic adversities among others. No medication side effects or related complaints today.     TREATMENT PLAN/GOALS: Continue supportive psychotherapy efforts and medications as indicated.     Current Outpatient Prescriptions   Medication Sig Dispense Refill   • QUEtiapine (SEROQUEL) 50 MG tablet One AM and two PM 90 tablet 0   • gabapentin (NEURONTIN) 600 MG tablet Take 1 tablet by mouth 2 (Two) Times a Day. 60 tablet 2   • venlafaxine XR (EFFEXOR XR) 75 MG 24 hr capsule Take 1 capsule by mouth Daily. 30 capsule 2     No current facility-administered medications for this visit.        COLLATERAL PSYCHOTHERAPEUTIC INTERVENTION: patient not interested in additional psychotherapy.     Encounter Diagnoses   Name Primary?   • Post traumatic stress disorder (PTSD) Yes   • Dysthymic disorder    • Panic disorder without agoraphobia        Return in about 3 months (around 5/28/2017).  Patient knows to call if symptoms worsen or fail to improve between appointments.

## 2023-09-22 ENCOUNTER — APPOINTMENT (INPATIENT)
Dept: NON INVASIVE DIAGNOSTICS | Facility: HOSPITAL | Age: 63
End: 2023-09-22
Payer: COMMERCIAL

## 2023-09-22 VITALS
BODY MASS INDEX: 26.74 KG/M2 | SYSTOLIC BLOOD PRESSURE: 143 MMHG | RESPIRATION RATE: 20 BRPM | DIASTOLIC BLOOD PRESSURE: 74 MMHG | OXYGEN SATURATION: 94 % | HEART RATE: 68 BPM | WEIGHT: 191 LBS | HEIGHT: 71 IN | TEMPERATURE: 97.8 F

## 2023-09-22 PROBLEM — E87.1 HYPONATREMIA: Status: RESOLVED | Noted: 2019-10-17 | Resolved: 2023-09-22

## 2023-09-22 LAB
ANION GAP SERPL CALCULATED.3IONS-SCNC: 6 MMOL/L
AORTIC ROOT: 3.4 CM
APICAL FOUR CHAMBER EJECTION FRACTION: 62 %
AV LVOT PEAK GRADIENT: 9 MMHG
AV PEAK GRADIENT: 10 MMHG
BUN SERPL-MCNC: 28 MG/DL (ref 5–25)
CALCIUM SERPL-MCNC: 9.1 MG/DL (ref 8.4–10.2)
CHLORIDE SERPL-SCNC: 102 MMOL/L (ref 96–108)
CO2 SERPL-SCNC: 28 MMOL/L (ref 21–32)
CREAT SERPL-MCNC: 1.2 MG/DL (ref 0.6–1.3)
E WAVE DECELERATION TIME: 184 MS
ERYTHROCYTE [DISTWIDTH] IN BLOOD BY AUTOMATED COUNT: 13.6 % (ref 11.6–15.1)
FRACTIONAL SHORTENING: 33 (ref 28–44)
GFR SERPL CREATININE-BSD FRML MDRD: 64 ML/MIN/1.73SQ M
GLUCOSE SERPL-MCNC: 107 MG/DL (ref 65–140)
GLUCOSE SERPL-MCNC: 110 MG/DL (ref 65–140)
GLUCOSE SERPL-MCNC: 150 MG/DL (ref 65–140)
GLUCOSE SERPL-MCNC: 162 MG/DL (ref 65–140)
GLUCOSE SERPL-MCNC: 98 MG/DL (ref 65–140)
HCT VFR BLD AUTO: 37.6 % (ref 36.5–49.3)
HGB BLD-MCNC: 12.8 G/DL (ref 12–17)
INTERVENTRICULAR SEPTUM IN DIASTOLE (PARASTERNAL SHORT AXIS VIEW): 1.1 CM
INTERVENTRICULAR SEPTUM: 1.1 CM (ref 0.6–1.1)
LAAS-AP2: 28.8 CM2
LAAS-AP4: 26.9 CM2
LEFT ATRIUM SIZE: 4.6 CM
LEFT ATRIUM VOLUME (MOD BIPLANE): 92 ML
LEFT INTERNAL DIMENSION IN SYSTOLE: 4.1 CM (ref 2.1–4)
LEFT VENTRICULAR INTERNAL DIMENSION IN DIASTOLE: 6.1 CM (ref 3.5–6)
LEFT VENTRICULAR POSTERIOR WALL IN END DIASTOLE: 1.1 CM
LEFT VENTRICULAR STROKE VOLUME: 115 ML
LVSV (TEICH): 115 ML
MCH RBC QN AUTO: 33.6 PG (ref 26.8–34.3)
MCHC RBC AUTO-ENTMCNC: 34 G/DL (ref 31.4–37.4)
MCV RBC AUTO: 99 FL (ref 82–98)
MV E'TISSUE VEL-SEP: 6 CM/S
MV PEAK A VEL: 0.71 M/S
MV PEAK E VEL: 113 CM/S
MV STENOSIS PRESSURE HALF TIME: 53 MS
MV VALVE AREA P 1/2 METHOD: 4.15
PLATELET # BLD AUTO: 192 THOUSANDS/UL (ref 149–390)
PMV BLD AUTO: 10.3 FL (ref 8.9–12.7)
POTASSIUM SERPL-SCNC: 4.1 MMOL/L (ref 3.5–5.3)
RBC # BLD AUTO: 3.81 MILLION/UL (ref 3.88–5.62)
RIGHT ATRIUM AREA SYSTOLE A4C: 19.4 CM2
RIGHT VENTRICLE ID DIMENSION: 3.5 CM
SL CV LEFT ATRIUM LENGTH A2C: 6.7 CM
SL CV LV EF: 62
SL CV PED ECHO LEFT VENTRICLE DIASTOLIC VOLUME (MOD BIPLANE) 2D: 189 ML
SL CV PED ECHO LEFT VENTRICLE SYSTOLIC VOLUME (MOD BIPLANE) 2D: 74 ML
SODIUM SERPL-SCNC: 136 MMOL/L (ref 135–147)
TR MAX PG: 25 MMHG
TR PEAK VELOCITY: 2.5 M/S
TRICUSPID ANNULAR PLANE SYSTOLIC EXCURSION: 1.6 CM
TRICUSPID VALVE PEAK REGURGITATION VELOCITY: 2.5 M/S
WBC # BLD AUTO: 9.15 THOUSAND/UL (ref 4.31–10.16)

## 2023-09-22 PROCEDURE — 80048 BASIC METABOLIC PNL TOTAL CA: CPT | Performed by: FAMILY MEDICINE

## 2023-09-22 PROCEDURE — 85027 COMPLETE CBC AUTOMATED: CPT | Performed by: FAMILY MEDICINE

## 2023-09-22 PROCEDURE — 93306 TTE W/DOPPLER COMPLETE: CPT

## 2023-09-22 PROCEDURE — 99232 SBSQ HOSP IP/OBS MODERATE 35: CPT | Performed by: INTERNAL MEDICINE

## 2023-09-22 PROCEDURE — 93306 TTE W/DOPPLER COMPLETE: CPT | Performed by: INTERNAL MEDICINE

## 2023-09-22 PROCEDURE — 82948 REAGENT STRIP/BLOOD GLUCOSE: CPT

## 2023-09-22 PROCEDURE — 99239 HOSP IP/OBS DSCHRG MGMT >30: CPT | Performed by: FAMILY MEDICINE

## 2023-09-22 RX ORDER — LISINOPRIL 20 MG/1
10 TABLET ORAL DAILY
Refills: 0
Start: 2023-09-22

## 2023-09-22 RX ORDER — AMLODIPINE BESYLATE 5 MG/1
5 TABLET ORAL DAILY
Status: DISCONTINUED | OUTPATIENT
Start: 2023-09-22 | End: 2023-09-22 | Stop reason: HOSPADM

## 2023-09-22 RX ADMIN — INSULIN LISPRO 1 UNITS: 100 INJECTION, SOLUTION INTRAVENOUS; SUBCUTANEOUS at 11:58

## 2023-09-22 RX ADMIN — GABAPENTIN 300 MG: 300 CAPSULE ORAL at 10:11

## 2023-09-22 RX ADMIN — NALTREXONE HYDROCHLORIDE 50 MG: 50 TABLET, FILM COATED ORAL at 11:52

## 2023-09-22 RX ADMIN — RANOLAZINE 500 MG: 500 TABLET, EXTENDED RELEASE ORAL at 10:11

## 2023-09-22 RX ADMIN — FOLIC ACID 1000 MCG: 1 TABLET ORAL at 10:11

## 2023-09-22 RX ADMIN — Medication 12.5 MG: at 10:11

## 2023-09-22 RX ADMIN — AMLODIPINE BESYLATE 5 MG: 5 TABLET ORAL at 11:52

## 2023-09-22 RX ADMIN — APIXABAN 5 MG: 5 TABLET, FILM COATED ORAL at 10:11

## 2023-09-22 RX ADMIN — ASPIRIN 81 MG: 81 TABLET, COATED ORAL at 10:11

## 2023-09-22 RX ADMIN — FERROUS SULFATE TAB 325 MG (65 MG ELEMENTAL FE) 325 MG: 325 (65 FE) TAB at 11:52

## 2023-09-22 RX ADMIN — FLUTICASONE FUROATE AND VILANTEROL TRIFENATATE 1 PUFF: 200; 25 POWDER RESPIRATORY (INHALATION) at 11:52

## 2023-09-22 RX ADMIN — INSULIN LISPRO 1 UNITS: 100 INJECTION, SOLUTION INTRAVENOUS; SUBCUTANEOUS at 00:16

## 2023-09-22 RX ADMIN — LISINOPRIL 10 MG: 10 TABLET ORAL at 10:11

## 2023-09-22 RX ADMIN — NICOTINE 1 PATCH: 21 PATCH, EXTENDED RELEASE TRANSDERMAL at 10:11

## 2023-09-22 RX ADMIN — THIAMINE HCL TAB 100 MG 100 MG: 100 TAB at 10:11

## 2023-09-22 RX ADMIN — Medication 400 MG: at 10:11

## 2023-09-22 RX ADMIN — PANTOPRAZOLE SODIUM 40 MG: 40 TABLET, DELAYED RELEASE ORAL at 05:12

## 2023-09-22 NOTE — DISCHARGE SUMMARY
[address]  Discharge- Solomon Lira 1960, 66 y.o. male MRN: [id number]  Unit/Bed#: 2 [address] Encounter: [id number]  Primary Care Provider: Damir Torrez MD   Date and time admitted to hospital: 9/20/2023  7:53 PM    * Chest pain  Assessment & Plan  Patient presented with localized intermittent chest pain since Saturday, reported chest pain worse when laying down at night, with associated upset stomach. · History of CAD, status post triple bypass in 2004, PCI with stents x2 in 2014. · History of paroxysmal A-fib briefly postoperatively   · Nuclear stress test in November last year was negative  · 2D echo in November last year showed EF 91%, grade 1 diastolic dysfunction, no significant valve disease  · Echo on this admission with normal EF and grade 2 diastolic dysfunction. · Did receive 20 mg of IV Lasix x1 on 9/21  · Troponins unremarkable  · No further cardiac work-up per cardiology while here    Paroxysmal atrial fibrillation with rapid ventricular response ([address])  Assessment & Plan  EKG with a flutter with RVR per cardiology  · While hospitalized on 9/21, patient reported chest pressure, felt anxious and was noted to be tachycardic on the monitor  · Per cardiology Coreg was changed to Lopressor for periodic atrial arrhythmias  · Follow-up with cardiology after discharge. Started on Eliquis    Hyponatremia-resolved as of 9/22/2023  Assessment & Plan  Sodium 129 on admission. History of hyponatremia , likely related to alcohol abuse. · urine sodium 28, urine osm 173, serum osm 277, uric acid - WNL   · TSH low but normal free T4. Recheck repeat TSH after discharge  · Received gentle IV hydration which has now resolved    Results from last 7 days   Lab Units 09/22/23  0506 09/21/23  0844 09/20/23 2013   SODIUM mmol/L 136 131* 129*       GERD (gastroesophageal reflux disease)  Assessment & Plan  · Continue PPI.   · Follow-up with GI after discharge for outpatient EGD/colonoscopy    COPD (chronic obstructive pulmonary disease) ([address])  Assessment & Plan  Patient was wheezing on ED arrival  · Received IV Solu-Medrol  · Continue home nebulizer treatments on discharge  · Held off on steroids while here    Sleep apnea  Assessment & Plan  Could not afford CPAP in the past.   · Patient thinks he needs CPAP. Referral to sleep medicine made     Alcohol abuse  Assessment & Plan  History of alcohol use. Quit 2 months ago. · Continue thiamine, ReVia, folic acid, MVI. Nicotine abuse  Assessment & Plan  · Nicotine patch, smoking cessation discussed    CAD (coronary artery disease)  Assessment & Plan  Continue statin, aspirin, Ranexa  Coreg changed to Lopressor    History of prostate cancer  Assessment & Plan  · Noted history    Essential hypertension  Assessment & Plan  · Continue Norvasc, Lopressor  · Continue lisinopril at a lower dose of 10 mg daily    Type 2 diabetes mellitus [facility])  Assessment & Plan  Lab Results   Component Value Date    HGBA1C 6.1 (H) 09/20/2023       Recent Labs     09/22/23  0015 09/22/23  0509 09/22/23  0729 09/22/23  1150   POCGLU 150* 107 98 162*     On metformin at home which is held here but okay to restart on discharge  · SSI while hospitalized    Elevated [id number] as of 9/21/2023  Assessment & Plan  · D-dimer 1.0  · CTA chest PE study no PE.     Medical Problems     Resolved Problems  Date Reviewed: 9/22/2023          Resolved    Elevated d-dimer 9/21/2023     Resolved by  Audrey Orellana DO        Discharging Physician / Practitioner: Audrey Orellana DO  PCP: Damir Torrez MD  Admission Date:   Admission Orders (From admission, onward)     Ordered        09/21/23 1550  Inpatient Admission  Once            09/20/23 2347  Place in Observation  Once                      Discharge Date: 09/22/23    Consultations During Hospital Stay:  · GI  · cardiology    Procedures Performed:   · TTE    Significant Findings / Test Results:   · See above    Incidental Findings:   · none    Test Results Pending at Discharge (will require follow up):   · none     Outpatient Tests Requested:  · TSH with free T4    Complications:  none    Reason for Admission: chest pain    Hospital Course:   Fransisco Alan is a 66 y.o. male patient who originally presented to the hospital on 9/20/2023 due to Intermittent chest pain worse with laying down at night and associated with an upset stomach. Denied any palpitations, vomiting. Noted to be in atrial flutter with RVR in the ER and was admitted. Patient seen by cardiology while here and medications were adjusted per them. Patient was also seen by GI while here who is recommending outpatient EGD with colonoscopy. Patient remains asymptomatic at time of discharge and cleared by all consultants prior to discharge. Discharge plan discussed in details with patient    Please see above list of diagnoses and related plan for additional information. Condition at Discharge: stable    Discharge Day Visit / Exam:   Subjective: Denies any chest pain, shortness of breath, palpitations. Feels ready to get home as soon as possible    Vitals: Blood Pressure: 143/74 (09/22/23 0920)  Pulse: 68 (09/22/23 0920)  Temperature: 97.8 °F (36.6 °C) (09/22/23 0300)  Temp Source: Oral (09/22/23 0300)  Respirations: 20 (09/22/23 0740)  Height: 5' 11" (180.3 cm) (09/22/23 0920)  Weight - Scale: 86.6 kg (191 lb) (09/22/23 0920)  SpO2: 94 % (09/22/23 1100)  Exam:   Physical Exam  Vitals reviewed. Constitutional:       General: He is not in acute distress. Appearance: He is not toxic-appearing. HENT:      Head: Normocephalic and atraumatic. Eyes:      General:         Right eye: No discharge. Left eye: No discharge. Extraocular Movements: Extraocular movements intact. Cardiovascular:      Rate and Rhythm: Normal rate and regular rhythm. Pulmonary:      Effort: Pulmonary effort is normal. No respiratory distress. Breath sounds: Normal breath sounds. No wheezing or rales. Abdominal:      General: Bowel sounds are normal. There is no distension. Palpations: Abdomen is soft. Tenderness: There is no abdominal tenderness. Neurological:      Mental Status: He is alert and oriented to person, place, and time. Discussion with Family: Patient declined call to . Discharge instructions/Information to patient and family:   See after visit summary for information provided to patient and family. Provisions for Follow-Up Care:  See after visit summary for information related to follow-up care and any pertinent home health orders. Disposition:   Home    Planned Readmission: no     Discharge Statement:  I spent > 30 minutes discharging the patient. This time was spent on the day of discharge. I had direct contact with the patient on the day of discharge. Greater than 50% of the total time was spent examining patient, answering all patient questions, arranging and discussing plan of care with patient as well as directly providing post-discharge instructions. Additional time then spent on discharge activities. Discharge Medications:  See after visit summary for reconciled discharge medications provided to patient and/or family.       **Please Note: This note may have been constructed using a voice recognition system**

## 2023-09-22 NOTE — CASE MANAGEMENT
Case Management Discharge Planning Note    Patient name Love Jernigan  Location 2 [facility] 219/2 Paul GOMEZ MRN [id number]  : 1960 Date 2023       Current Admission Date: 2023  Current Admission Diagnosis:Chest pain   Patient Active Problem List    Diagnosis Date Noted   • Chest pain 2023   • Paroxysmal atrial fibrillation with rapid ventricular response ([address]) 2023   • GERD (gastroesophageal reflux disease) 2023   • Sleep apnea 2023   • Stable angina ([address]) 2022   • Stage 3a chronic kidney disease ([address]) 2022   • Fatty liver, alcoholic    • Nonischemic nontraumatic myocardial injury 04/10/2022   • History of atrial fibrillation 10/25/2021   • Poor historian 10/24/2021   • Mild protein-calorie malnutrition ([address]) 2021   • Prostate cancer ([address]) 2021   • History of Atrial fibrillation ([address]) 2020   • Chronic heart failure with preserved ejection fraction ([address]) 2019   • Alcohol intoxication ([address]) 2019   • Alcohol abuse 10/17/2019   • Cervical spinal stenosis 10/17/2019   • Thrombocytopenia ([address]) 2019   • History of prostate cancer 2019   • CAD (coronary artery disease) 2019   • Nicotine abuse 2019   • Hypomagnesemia 10/10/2018   • Depression, recurrent ([address]) 10/10/2018   • Hx of CABG 10/10/2018   • Dyslipidemia 10/10/2018   • COPD (chronic obstructive pulmonary disease) ([address]) 10/09/2018   • Type 2 diabetes mellitus ([address]) 10/09/2018   • Essential hypertension 10/09/2018      LOS (days): 1  Geometric Mean LOS (GMLOS) (days): 2.30  Days to GMLOS:1.3     OBJECTIVE:  Risk of Unplanned Readmission Score: 29.26         Current admission status: Inpatient   Preferred Pharmacy:   [facility], [address] [address]  Phone: [phone] Fax: [phone]    Primary Care Provider: Idris Villalobos MD    Primary Insurance: [address] [place] [address] REP  Secondary Insurance:     DISCHARGE DETAILS:    Other Referral/Resources/Interventions Provided:  Interventions: Prescription Price Check  Referral Comments: CM called patient's pharmacy for price check on Eliquis and was informed that patient will have $7 copay for 30 day supply of Eliquis. Advanced Practitioner Sky Martinez, Dr. Ellen Walker and patient made aware.

## 2023-09-22 NOTE — PLAN OF CARE
Problem: PAIN - ADULT  Goal: Verbalizes/displays adequate comfort level or baseline comfort level  Description: Interventions:  - Encourage patient to monitor pain and request assistance  - Assess pain using appropriate pain scale  - Administer analgesics based on type and severity of pain and evaluate response  - Implement non-pharmacological measures as appropriate and evaluate response  - Consider cultural and social influences on pain and pain management  - Notify physician/advanced practitioner if interventions unsuccessful or patient reports new pain  Outcome: Progressing     Problem: DISCHARGE PLANNING  Goal: Discharge to home or other facility with appropriate resources  Description: INTERVENTIONS:  - Identify barriers to discharge w/patient and caregiver  - Arrange for needed discharge resources and transportation as appropriate  - Identify discharge learning needs (meds, wound care, etc.)  - Arrange for interpretive services to assist at discharge as needed  - Refer to Case Management Department for coordinating discharge planning if the patient needs post-hospital services based on physician/advanced practitioner order or complex needs related to functional status, cognitive ability, or social support system  Outcome: Progressing     Problem: CARDIOVASCULAR - ADULT  Goal: Maintains optimal cardiac output and hemodynamic stability  Description: INTERVENTIONS:  - Monitor I/O, vital signs and rhythm  - Monitor for S/S and trends of decreased cardiac output  - Administer and titrate ordered vasoactive medications to optimize hemodynamic stability  - Assess quality of pulses, skin color and temperature  - Assess for signs of decreased coronary artery perfusion  - Instruct patient to report change in severity of symptoms  Outcome: Progressing  Goal: Absence of cardiac dysrhythmias or at baseline rhythm  Description: INTERVENTIONS:  - Continuous cardiac monitoring, vital signs, obtain 12 lead EKG if ordered  - Administer antiarrhythmic and heart rate control medications as ordered  - Monitor electrolytes and administer replacement therapy as ordered  Outcome: Progressing     Problem: RESPIRATORY - ADULT  Goal: Achieves optimal ventilation and oxygenation  Description: INTERVENTIONS:  - Assess for changes in respiratory status  - Assess for changes in mentation and behavior  - Position to facilitate oxygenation and minimize respiratory effort  - Oxygen administered by appropriate delivery if ordered  - Initiate smoking cessation education as indicated  - Encourage broncho-pulmonary hygiene including cough, deep breathe, Incentive Spirometry  - Assess the need for suctioning and aspirate as needed  - Assess and instruct to report SOB or any respiratory difficulty  - Respiratory Therapy support as indicated  Outcome: Progressing

## 2023-09-22 NOTE — ASSESSMENT & PLAN NOTE
Lab Results   Component Value Date    HGBA1C 6.1 (H) 09/20/2023       Recent Labs     09/22/23  0015 09/22/23  0509 09/22/23  0729 09/22/23  1150   POCGLU 150* 107 98 162*     On metformin at home which is held here but okay to restart on discharge  · SSI while hospitalized

## 2023-09-22 NOTE — ASSESSMENT & PLAN NOTE
Sodium 129 on admission. History of hyponatremia , likely related to alcohol abuse. · urine sodium 28, urine osm 173, serum osm 277, uric acid - WNL   · TSH low but normal free T4.   Recheck repeat TSH after discharge  · Received gentle IV hydration which has now resolved    Results from last 7 days   Lab Units 09/22/23  0506 09/21/23  0844 09/20/23 2013   SODIUM mmol/L 136 131* 129*

## 2023-09-22 NOTE — PROGRESS NOTES
Progress Note - Cardiology   [facility]     Omar Bunch 66 y.o. male MRN: [id number]  : 1960  Unit/Bed#: 2 [address] Encounter: [id number]    Assessment and Plan:   1. Paroxysmal atrial fibrillation flutter: Patient admitted with atrial flutter with rapid ventricular response    -   Patient with a history of paroxysmal atrial fibrillation    -   Coreg discontinued and patient transition to Lopressor    -   We will increase Lopressor 25 mg twice daily    -   [id number] score = 3, recommend Eliquis 5 mg twice a day    -   Rx sent to his pharmacy for price check    2. Coronary artery disease: 2022 nuclear stress test: No evidence of stress-induced myocardial ischemia    -   History of CABG x3 in  and PCI of the LAD in     -   Continue Ranexa 500 mg twice daily    -   Continue aspirin 81 mg once a day    -   Continue Pravachol 40 mg daily    -   Continue Lopressor 25 mg twice a day    3. Hypertension: Blood pressure stable    -   We will increase Lopressor 25 mg twice daily    -   Continue lisinopril 10 mg daily    -   We will restart Norvasc 5 mg daily    4. Diabetes: Hemoglobin A1c 6.1    -   As per primary team    6. Obstructive sleep apnea: Patient attempting to get new CPAP machine    7. History of alcohol abuse: Patient states he quit drinking 2 months ago    Subjective / Objective:   Seen and examined. Telemetry overnight demonstrates sinus rhythm. Patient with history of paroxysmal atrial fibrillation flutter. Vitals: Blood pressure 143/74, pulse 68, temperature 97.8 °F (36.6 °C), temperature source Oral, resp. rate 20, height 5' 11" (1.803 m), weight 86.6 kg (191 lb), SpO2 95 %. Vitals:    23 0304 23 0920   Weight: 86.6 kg (191 lb) 86.6 kg (191 lb)     Body mass index is 26.64 kg/m².   BP Readings from Last 3 Encounters:   23 143/74   23 122/54   23 130/86     Orthostatic Blood Pressures    Flowsheet Row Most Recent Value   Blood Pressure 143/74 filed at 09/22/2023 0920   Patient Position - Orthostatic VS Lying filed at 09/22/2023 0300        I/O       09/20 0701  09/21 0700 09/21 0701  09/22 0700 09/22 0701  09/23 0700    Urine (mL/kg/hr) 400 800 (0.4)     Total Output 400 800     Net -400 -800                Invasive Devices     Peripheral Intravenous Line  Duration           Peripheral IV 09/20/23 Left Antecubital 1 day                  Intake/Output Summary (Last 24 hours) at 9/22/2023 1040  Last data filed at 9/21/2023 1957  Gross per 24 hour   Intake --   Output 800 ml   Net -800 ml         Physical Exam:   Physical Exam  Vitals and nursing note reviewed. Constitutional:       General: He is not in acute distress. Appearance: Normal appearance. He is normal weight. HENT:      Right Ear: External ear normal.      Left Ear: External ear normal.   Eyes:      General: No scleral icterus. Right eye: No discharge. Left eye: No discharge. Cardiovascular:      Rate and Rhythm: Normal rate and regular rhythm. Pulses: Normal pulses. Heart sounds: Murmur heard. Pulmonary:      Effort: Pulmonary effort is normal. No respiratory distress. Breath sounds: Examination of the right-lower field reveals decreased breath sounds. Examination of the left-lower field reveals decreased breath sounds. Decreased breath sounds present. Abdominal:      General: Bowel sounds are normal. There is no distension. Palpations: Abdomen is soft. Skin:     General: Skin is warm and dry. Capillary Refill: Capillary refill takes less than 2 seconds. Neurological:      General: No focal deficit present. Mental Status: He is alert and oriented to person, place, and time. Mental status is at baseline.    Psychiatric:         Mood and Affect: Mood normal.            Medications/ Allergies:     Current Facility-Administered Medications   Medication Dose Route Frequency Provider Last Rate   • acetaminophen  650 mg Oral Q6H PRN CHELSEA Fisher     • aluminum-magnesium hydroxide-simethicone  30 mL Oral Q4H PRN CHELSEA Fisher     • apixaban  5 mg Oral BID [facility], PA-C     • aspirin  81 mg Oral Daily CHELSEA Fisher     • ferrous sulfate  325 mg Oral Daily With Breakfast CHELSEA Fisher     • fluticasone-vilanterol  1 puff Inhalation Daily CHELSEA Fisher     • folic acid  7,393 mcg Oral Daily CHELSEA Fisher     • gabapentin  300 mg Oral TID CHELSEA Fisher     • insulin lispro  1-5 Units Subcutaneous Q6H [address] CHELSEA Fisher     • levalbuterol  0.63 mg Nebulization Q4H PRN CHELSEA Fisher     • lisinopril  10 mg Oral Daily CHELSEA Fisher     • magnesium Oxide  400 mg Oral BID CHELSEA Fisher     • melatonin  3 mg Oral HS CHELSEA Fisher     • metoprolol tartrate  12.5 mg Oral Q12H [address] [facility], PA-C     • morphine injection  2 mg Intravenous Q4H PRN CHELSEA Fisher     • naltrexone  50 mg Oral Daily CHELSEA Fisher     • nicotine  1 patch Transdermal Daily CHELSEA Fisher     • ondansetron  4 mg Intravenous Q6H PRN CHELSEA Fisher     • pantoprazole  40 mg Oral Early Morning CHELSEA Fisher     • pravastatin  40 mg Oral Daily With CHELSEA Melo     • ranolazine  500 mg Oral Q12H [address] CHELSEA Fisher     • tamsulosin  0.4 mg Oral Daily With CHELSEA Melo     • vitamin B-1  100 mg Oral Daily CHELSEA Fisher     • varenicline  1 mg Oral BID CHELSEA Fisher       acetaminophen, 650 mg, Q6H PRN  aluminum-magnesium hydroxide-simethicone, 30 mL, Q4H PRN  levalbuterol, 0.63 mg, Q4H PRN  morphine injection, 2 mg, Q4H PRN  ondansetron, 4 mg, Q6H PRN      No Known Allergies    VTE Pharmacologic Prophylaxis:   Sequential compression device (Venodyne)     Labs:   Troponins:  Results from last 7 days   Lab Units 09/21/23  1011 09/20/23  2226   HS TNI RAND ng/L 12  --    HSTNI D2 ng/L  --  -4     CBC with diff:  Results from last 7 days   Lab Units 09/22/23  0506 09/20/23 2013   WBC Thousand/uL 9.15 8.83   HEMOGLOBIN g/dL 12.8 14.4   HEMATOCRIT % 37.6 40.8   MCV fL 99* 98   PLATELETS Thousands/uL 192 205   RBC Million/uL 3.81* 4.18   MCH pg 33.6 34.4*   MCHC g/dL 34.0 35.3   RDW % 13.6 13.5   MPV fL 10.3 10.5   NRBC AUTO /100 WBCs  --  0     CMP:  Results from last 7 days   Lab Units 09/22/23  0506 09/21/23 0844 09/20/23 2013   SODIUM mmol/L 136 131* 129*   POTASSIUM mmol/L 4.1 4.8 5.2   CHLORIDE mmol/L 102 101 96   CO2 mmol/L 28 25 24   ANION GAP mmol/L 6 5 9   BUN mg/dL 28* 21 22   CREATININE mg/dL 1.20 1.07 1.04   GLUCOSE FASTING mg/dL  --  133*  --    CALCIUM mg/dL 9.1 9.3 9.6   AST U/L  --   --  35   ALT U/L  --   --  28   ALK PHOS U/L  --   --  51   TOTAL PROTEIN g/dL  --   --  7.8   ALBUMIN g/dL  --   --  4.2   TOTAL BILIRUBIN mg/dL  --   --  0.42   EGFR ml/min/1.73sq m 64 74 77     Magnesium:  Results from last 7 days   Lab Units 09/21/23 0844 09/20/23 2013   MAGNESIUM mg/dL 2.0 1.9     Coags:  Results from last 7 days   Lab Units 09/20/23 2013   PTT seconds 28   INR  0.94     TSH:  Results from last 7 days   Lab Units 09/21/23 0844   TSH 3RD GENERATON uIU/mL 0.293*     Hgb A1c:  Results from last 7 days   Lab Units 09/20/23 2013   HEMOGLOBIN A1C % 6.1*       Imaging & Testing   I have personally reviewed pertinent reports. CTA ED chest PE study    Result Date: 9/20/2023  Narrative: CTA - CHEST WITH IV CONTRAST - PULMONARY ANGIOGRAM INDICATION:   sob, chest tightness, tachycardia, elevated Ddimer. COMPARISON: CT chest abdomen pelvis 3/11/2022 TECHNIQUE: CTA examination of the chest was performed using angiographic technique according to a protocol specifically tailored to evaluate for pulmonary embolism. Multiplanar 2D reformatted images were created from the source data. In addition, coronal 3D MIP postprocessing was performed on the acquisition scanner.  Radiation dose length product (DLP) for this visit:  627.51 mGy-cm .  This examination, like all CT scans performed in the [facility], was performed utilizing techniques to minimize radiation dose exposure, including the use of iterative  reconstruction and automated exposure control. IV Contrast:  85 mL of iohexol (OMNIPAQUE) FINDINGS: PULMONARY ARTERIAL TREE: There is adequate opacification of the pulmonary arterial vasculature with no filling defects identified to indicate acute pulmonary emboli. Central pulmonary arteries are of normal caliber. LUNGS:  Lungs are clear. There is no tracheal or endobronchial lesion. PLEURA:  Unremarkable. HEART/GREAT VESSELS: The heart is mildly enlarged. Coronary artery calcification is present. RV/LV ratio normal at 0.9. No thoracic aortic aneurysm. MEDIASTINUM AND DENZEL:  Unremarkable. CHEST WALL AND LOWER NECK:   Post median sternotomy. VISUALIZED STRUCTURES IN THE UPPER ABDOMEN: Partially imaged adrenal glands demonstrate nodularity, previously described and likely reflecting nodular adrenal hyperplasia. OSSEOUS STRUCTURES: Degenerative change present in the spine. Inferior aspect of posterior instrumented fusion hardware noted extending to the C2 level. Impression: 1. No evidence for acute pulmonary embolism or other acute intrathoracic abnormality. 2. Additional findings as noted. Workstation performed: [id number]        EKG / Monitor: Personally reviewed. Patient wearing telemetry intermittent overnight. Telemetry that has been recorded is sinus rhythm        [facility]  Cardiology      "This note was completed in part utilizing [employer] direct voice recognition software. Grammatical errors, random word insertion, spelling mistakes, and incomplete sentences may be an occasional consequence of the system secondary to software limitations, ambient noise and hardware issues. Please read the chart carefully and recognize, using context, where substitutions have occurred.   If you have any questions or concerns about the context, text or information contained within the body of this dictation, please contact myself, the provider, for further clarification."

## 2023-09-22 NOTE — DISCHARGE INSTR - AVS FIRST PAGE
Your TSH level was noted to be low but free T4 level was normal.  You will need repeat lab work after discharge with your primary care doctor at a later time to see if this is accurate    Stop smoking

## 2023-09-22 NOTE — ASSESSMENT & PLAN NOTE
Could not afford CPAP in the past.   · Patient thinks he needs CPAP.  Referral to sleep medicine made

## 2023-09-22 NOTE — PROGRESS NOTES
[place] - [facility]  Brenton Murphy 66 y.o. male MRN: [id number]    Unit/Bed#: 2 [address] Encounter: [id number]      Assessment/Plan:  59-year-old male with a past medical history of CAD, paroxysmal atrial fibrillation, hypertension, COPD, alcohol abuse, nicotine abuse, type 2 diabetes mellitus, prostate cancer who presents with report of  chest pain and tightness since Saturday.     1. Chest pain   2. GERD  Patient presented with report of chest pain and tightness for the last 4 days. Patient reports that chest pain was worse with lying down and associated with upset stomach. Patient does report taking Tums at home for upset stomach. Patient is currently on no PPI or acid reduction medication. Patient reports symptoms are now resolved. Denies acid reflux, heartburn, nausea, vomiting, epigastric or abdominal pain.     -Continue pantoprazole 40 Mg daily  -Cardiology following  -Diet as tolerated  -Follow-up with GI as outpatient  -Patient will need EGD for further evaluation this can be done as an outpatient.  -Okay to discharge patient from GI standpoint if symptoms remain controlled.     3. Colon cancer screening  Patient never had a colonoscopy, he is age 66.  -Colonoscopy for colon cancer screening as outpatient  -Follow-up with GI as outpatient  -Will follow as needed, call GI if needed. Subjective:  Lying in bed in no acute distress. Tolerating diet. Denies nausea, vomiting, acid reflux, heartburn, epigastric or abdominal pain. Objective:     Vitals: Blood pressure 150/91, pulse 80, temperature 97.8 °F (36.6 °C), temperature source Oral, resp. rate 20, height 5' 11" (1.803 m), weight 86.6 kg (191 lb), SpO2 95 %. ,Body mass index is 26.64 kg/m². Intake/Output Summary (Last 24 hours) at 9/22/2023 0834  Last data filed at 9/21/2023 1957  Gross per 24 hour   Intake --   Output 800 ml   Net -800 ml       Physical Exam: Physical Exam  Vitals and nursing note reviewed.    Constitutional: General: He is not in acute distress. Cardiovascular:      Rate and Rhythm: Normal rate and regular rhythm. Pulses: Normal pulses. Heart sounds: Normal heart sounds. Pulmonary:      Effort: Pulmonary effort is normal. No respiratory distress. Breath sounds: Normal breath sounds. No stridor. No wheezing, rhonchi or rales. Abdominal:      General: Bowel sounds are normal. There is no distension. Palpations: Abdomen is soft. There is no mass. Tenderness: There is no abdominal tenderness. There is no guarding or rebound. Hernia: No hernia is present. Musculoskeletal:      Right lower leg: No edema. Left lower leg: No edema. Skin:     General: Skin is warm and dry. Capillary Refill: Capillary refill takes less than 2 seconds. Coloration: Skin is not jaundiced or pale. Neurological:      Mental Status: He is alert and oriented to person, place, and time.    Psychiatric:         Mood and Affect: Mood normal.         Invasive Devices     Peripheral Intravenous Line  Duration           Peripheral IV 09/20/23 Left Antecubital 1 day                Current Facility-Administered Medications:   •  acetaminophen (TYLENOL) tablet 650 mg, 650 mg, Oral, Q6H PRN, CHELSEA Fisher, 650 mg at 09/21/23 0304  •  aluminum-magnesium hydroxide-simethicone (MAALOX) oral suspension 30 mL, 30 mL, Oral, Q4H PRN, CHELSEA Fisher, 30 mL at 09/21/23 0304  •  apixaban (ELIQUIS) tablet 5 mg, 5 mg, Oral, BID, Isela Ramirez PA-C, 5 mg at 09/21/23 1725  •  aspirin (ECOTRIN LOW STRENGTH) EC tablet 81 mg, 81 mg, Oral, Daily, MARIANNE FisherNP, 81 mg at 09/21/23 8256  •  ferrous sulfate tablet 325 mg, 325 mg, Oral, Daily With Breakfast, CHELSEA Fisher, 325 mg at 09/21/23 0801  •  fluticasone-vilanterol 200-25 mcg/actuation 1 puff, 1 puff, Inhalation, Daily, CHELSEA Fisher, 1 puff at 96/47/85 2352  •  folic acid (FOLVITE) tablet 1,000 mcg, 1,000 mcg, Oral, Daily, CHELSEA Fisher, 1,000 mcg at 09/21/23 0813  •  gabapentin (NEURONTIN) capsule 300 mg, 300 mg, Oral, TID, CHELSEA Fisher, 300 mg at 09/21/23 2001  •  insulin lispro (HumaLOG) 100 units/mL subcutaneous injection 1-5 Units, 1-5 Units, Subcutaneous, Q6H [facility], 1 Units at 09/22/23 0016 **AND** Fingerstick Glucose (POCT), , , Q6H, CHELSEA Fisher  •  levalbuterol (XOPENEX) inhalation solution 0.63 mg, 0.63 mg, Nebulization, Q4H PRN, CHELSEA Fisher  •  lisinopril (ZESTRIL) tablet 10 mg, 10 mg, Oral, Daily, CHELSEA Fisher  •  magnesium Oxide (MAG-OX) tablet 400 mg, 400 mg, Oral, BID, CHELSEA Fisher, 400 mg at 09/21/23 1725  •  melatonin tablet 3 mg, 3 mg, Oral, HS, CHELSEA Fisher  •  metoprolol tartrate (LOPRESSOR) partial tablet 12.5 mg, 12.5 mg, Oral, Q12H [facility], Pretty Contreras PA-C, 12.5 mg at 09/21/23 2001  •  morphine injection 2 mg, 2 mg, Intravenous, Q4H PRN, CHELSEA Fisher, 2 mg at 09/21/23 2058  •  naltrexone (REVIA) tablet 50 mg, 50 mg, Oral, Daily, CHELSEA Fisher, 50 mg at 09/21/23 0820  •  nicotine (NICODERM CQ) 21 mg/24 hr TD 24 hr patch 1 patch, 1 patch, Transdermal, Daily, CHELSEA Fisher, 1 patch at 09/21/23 0805  •  ondansetron (ZOFRAN) injection 4 mg, 4 mg, Intravenous, Q6H PRN, CHELSEA Fisher  •  pantoprazole (PROTONIX) EC tablet 40 mg, 40 mg, Oral, Early Morning, CHELSEA Fisher, 40 mg at 09/22/23 0095  •  pravastatin (PRAVACHOL) tablet 40 mg, 40 mg, Oral, Daily With Dinner, CHELSEA Fisher, 40 mg at 09/21/23 1548  •  ranolazine (RANEXA) 12 hr tablet 500 mg, 500 mg, Oral, Q12H [facility], CHELSEA Fisher, 500 mg at 09/21/23 2001  •  tamsulosin (FLOMAX) capsule 0.4 mg, 0.4 mg, Oral, Daily With Dinner, CHELSEA Fisher, 0.4 mg at 09/21/23 1548  •  thiamine tablet 100 mg, 100 mg, Oral, Daily, CHELSEA Fisher, 100 mg at 09/21/23 5070  •  varenicline (CHANTIX) tablet 1 mg, 1 mg, Oral, BID, CHELSEA Fisher    Lab Results:   Recent Results (from the past 24 hour(s))   TSH, 3rd generation Collection Time: 09/21/23  8:44 AM   Result Value Ref Range    TSH 3RD GENERATON 0.293 (L) 0.450 - 4.500 uIU/mL   T4, free    Collection Time: 09/21/23  8:44 AM   Result Value Ref Range    Free T4 0.72 0.61 - 1.12 ng/dL   Basic metabolic panel    Collection Time: 09/21/23  8:44 AM   Result Value Ref Range    Sodium 131 (L) 135 - 147 mmol/L    Potassium 4.8 3.5 - 5.3 mmol/L    Chloride 101 96 - 108 mmol/L    CO2 25 21 - 32 mmol/L    ANION GAP 5 mmol/L    BUN 21 5 - 25 mg/dL    Creatinine 1.07 0.60 - 1.30 mg/dL    Glucose 133 65 - 140 mg/dL    Glucose, Fasting 133 (H) 65 - 99 mg/dL    Calcium 9.3 8.4 - 10.2 mg/dL    eGFR 74 ml/min/1.73sq m   Magnesium    Collection Time: 09/21/23  8:44 AM   Result Value Ref Range    Magnesium 2.0 1.9 - 2.7 mg/dL   High Sensitivity Troponin I Random    Collection Time: 09/21/23 10:11 AM   Result Value Ref Range    HS TnI random 12 8 - 18 ng/L   Fingerstick Glucose (POCT)    Collection Time: 09/21/23 11:26 AM   Result Value Ref Range    POC Glucose 238 (H) 65 - 140 mg/dl   ECG 12 lead    Collection Time: 09/21/23 12:31 PM   Result Value Ref Range    Ventricular Rate 89 BPM    Atrial Rate 89 BPM    MN Interval 132 ms    QRSD Interval 86 ms    QT Interval 368 ms    QTC Interval 447 ms    P Axis 74 degrees    QRS Axis 84 degrees    T Wave Revloc 77 degrees   ECG 12 lead    Collection Time: 09/21/23 12:31 PM   Result Value Ref Range    Ventricular Rate 150 BPM    Atrial Rate 150 BPM    MN Interval 120 ms    QRSD Interval 88 ms    QT Interval 258 ms    QTC Interval 407 ms    P Revloc 112 degrees    QRS Axis 86 degrees    T Wave Axis -68 degrees   ECG 12 lead    Collection Time: 09/21/23 12:32 PM   Result Value Ref Range    Ventricular Rate 146 BPM    Atrial Rate 146 BPM    MN Interval 126 ms    QRSD Interval 92 ms    QT Interval 344 ms    QTC Interval 536 ms    P Revloc 118 degrees    QRS Axis 87 degrees    T Wave Axis 65 degrees   ECG 12 lead    Collection Time: 09/21/23 12:42 PM   Result Value Ref Range    Ventricular Rate 90 BPM    Atrial Rate 90 BPM    SD Interval 122 ms    QRSD Interval 86 ms    QT Interval 372 ms    QTC Interval 455 ms    P Axis 74 degrees    QRS Axis 85 degrees    T Wave Axis 68 degrees   ECG 12 lead    Collection Time: 09/21/23 12:43 PM   Result Value Ref Range    Ventricular Rate 91 BPM    Atrial Rate 91 BPM    SD Interval 128 ms    QRSD Interval 86 ms    QT Interval 358 ms    QTC Interval 440 ms    P [place] 72 degrees    QRS Axis 82 degrees    T Wave Axis 63 degrees   ECG 12 lead    Collection Time: 09/21/23 12:44 PM   Result Value Ref Range    Ventricular Rate 93 BPM    Atrial Rate 93 BPM    SD Interval 128 ms    QRSD Interval 86 ms    QT Interval 406 ms    QTC Interval 504 ms    P [place] 78 degrees    QRS Axis 84 degrees    T Wave Axis 74 degrees   ECG 12 lead    Collection Time: 09/21/23 12:45 PM   Result Value Ref Range    Ventricular Rate 91 BPM    Atrial Rate 91 BPM    SD Interval 126 ms    QRSD Interval 84 ms    QT Interval 364 ms    QTC Interval 447 ms    P [place] 69 degrees    QRS Axis 84 degrees    T Wave Axis 78 degrees   Fingerstick Glucose (POCT)    Collection Time: 09/21/23  4:12 PM   Result Value Ref Range    POC Glucose 140 65 - 140 mg/dl   Fingerstick Glucose (POCT)    Collection Time: 09/22/23 12:15 AM   Result Value Ref Range    POC Glucose 150 (H) 65 - 140 mg/dl   Basic metabolic panel    Collection Time: 09/22/23  5:06 AM   Result Value Ref Range    Sodium 136 135 - 147 mmol/L    Potassium 4.1 3.5 - 5.3 mmol/L    Chloride 102 96 - 108 mmol/L    CO2 28 21 - 32 mmol/L    ANION GAP 6 mmol/L    BUN 28 (H) 5 - 25 mg/dL    Creatinine 1.20 0.60 - 1.30 mg/dL    Glucose 110 65 - 140 mg/dL    Calcium 9.1 8.4 - 10.2 mg/dL    eGFR 64 ml/min/1.73sq m   CBC    Collection Time: 09/22/23  5:06 AM   Result Value Ref Range    WBC 9.15 4.31 - 10.16 Thousand/uL    RBC 3.81 (L) 3.88 - 5.62 Million/uL    Hemoglobin 12.8 12.0 - 17.0 g/dL    Hematocrit 37.6 36.5 - 49.3 %    MCV 99 (H) 82 - 98 fL    MCH 33.6 26.8 - 34.3 pg    MCHC 34.0 31.4 - 37.4 g/dL    RDW 13.6 11.6 - 15.1 %    Platelets 261 075 - 226 Thousands/uL    MPV 10.3 8.9 - 12.7 fL   Fingerstick Glucose (POCT)    Collection Time: 09/22/23  5:09 AM   Result Value Ref Range    POC Glucose 107 65 - 140 mg/dl   Fingerstick Glucose (POCT)    Collection Time: 09/22/23  7:29 AM   Result Value Ref Range    POC Glucose 98 65 - 140 mg/dl             Imaging Studies: CTA ED chest PE study    Result Date: 9/20/2023  Narrative: CTA - CHEST WITH IV CONTRAST - PULMONARY ANGIOGRAM INDICATION:   sob, chest tightness, tachycardia, elevated Ddimer. COMPARISON: CT chest abdomen pelvis 3/11/2022 TECHNIQUE: CTA examination of the chest was performed using angiographic technique according to a protocol specifically tailored to evaluate for pulmonary embolism. Multiplanar 2D reformatted images were created from the source data. In addition, coronal 3D MIP postprocessing was performed on the acquisition scanner. Radiation dose length product (DLP) for this visit:  627.51 mGy-cm . This examination, like all CT scans performed in the [facility], was performed utilizing techniques to minimize radiation dose exposure, including the use of iterative  reconstruction and automated exposure control. IV Contrast:  85 mL of iohexol (OMNIPAQUE) FINDINGS: PULMONARY ARTERIAL TREE: There is adequate opacification of the pulmonary arterial vasculature with no filling defects identified to indicate acute pulmonary emboli. Central pulmonary arteries are of normal caliber. LUNGS:  Lungs are clear. There is no tracheal or endobronchial lesion. PLEURA:  Unremarkable. HEART/GREAT VESSELS: The heart is mildly enlarged. Coronary artery calcification is present. RV/LV ratio normal at 0.9. No thoracic aortic aneurysm. MEDIASTINUM AND DENZEL:  Unremarkable. CHEST WALL AND LOWER NECK:   Post median sternotomy.  VISUALIZED STRUCTURES IN THE UPPER ABDOMEN: Partially imaged adrenal glands demonstrate nodularity, previously described and likely reflecting nodular adrenal hyperplasia. OSSEOUS STRUCTURES: Degenerative change present in the spine. Inferior aspect of posterior instrumented fusion hardware noted extending to the C2 level. Impression: 1. No evidence for acute pulmonary embolism or other acute intrathoracic abnormality. 2. Additional findings as noted. Workstation performed: [id number]           Levell , CRNP      Please Note: "This note has been constructed using a voice recognition system. Therefore there may be syntax, spelling, and/or grammatical errors.  Please call if you have any questions. "**

## 2023-09-22 NOTE — ASSESSMENT & PLAN NOTE
Patient was wheezing on ED arrival  · Received IV Solu-Medrol  · Continue home nebulizer treatments on discharge  · Held off on steroids while here

## 2023-09-22 NOTE — ASSESSMENT & PLAN NOTE
EKG with a flutter with RVR per cardiology  · While hospitalized on 9/21, patient reported chest pressure, felt anxious and was noted to be tachycardic on the monitor  · Per cardiology Coreg was changed to Lopressor for periodic atrial arrhythmias  · Follow-up with cardiology after discharge.   Started on Eliquis

## 2023-09-22 NOTE — ASSESSMENT & PLAN NOTE
Patient presented with localized intermittent chest pain since Saturday, reported chest pain worse when laying down at night, with associated upset stomach. · History of CAD, status post triple bypass in 2004, PCI with stents x2 in 2014. · History of paroxysmal A-fib briefly postoperatively   · Nuclear stress test in November last year was negative  · 2D echo in November last year showed EF 86%, grade 1 diastolic dysfunction, no significant valve disease  · Echo on this admission with normal EF and grade 2 diastolic dysfunction.     · Did receive 20 mg of IV Lasix x1 on 9/21  · Troponins unremarkable  · No further cardiac work-up per cardiology while here

## 2023-09-22 NOTE — PROGRESS NOTES
Discharge to home via walking out of the unit , AVS reviewed, written instructions given to patient, patient verbalized understanding, no written prescriptions given, e-prescription called in to pharmacy IV removed per protocol, masimo removed per protocol, all belongings taken with patient.

## 2023-09-27 ENCOUNTER — TELEPHONE (OUTPATIENT)
Dept: FAMILY MEDICINE CLINIC | Facility: CLINIC | Age: 63
End: 2023-09-27

## 2023-09-27 NOTE — TELEPHONE ENCOUNTER
vm on appt line:     Yeah, my name is Cherrie Mcdonald. I just got out of the hospital I need a doctor's appointment right away. My number is [phone]. Please leave a message or a text.

## 2023-09-28 ENCOUNTER — TELEPHONE (OUTPATIENT)
Age: 63
End: 2023-09-28

## 2023-09-28 ENCOUNTER — OFFICE VISIT (OUTPATIENT)
Dept: FAMILY MEDICINE CLINIC | Facility: CLINIC | Age: 63
End: 2023-09-28
Payer: COMMERCIAL

## 2023-09-28 VITALS
OXYGEN SATURATION: 99 % | TEMPERATURE: 96.6 F | SYSTOLIC BLOOD PRESSURE: 101 MMHG | HEIGHT: 71 IN | HEART RATE: 82 BPM | DIASTOLIC BLOOD PRESSURE: 58 MMHG | WEIGHT: 217.6 LBS | BODY MASS INDEX: 30.46 KG/M2

## 2023-09-28 DIAGNOSIS — Z71.6 ENCOUNTER FOR SMOKING CESSATION COUNSELING: ICD-10-CM

## 2023-09-28 DIAGNOSIS — K21.9 GASTROESOPHAGEAL REFLUX DISEASE, UNSPECIFIED WHETHER ESOPHAGITIS PRESENT: Primary | ICD-10-CM

## 2023-09-28 DIAGNOSIS — R79.89 ABNORMAL THYROID STIMULATING HORMONE LEVEL: ICD-10-CM

## 2023-09-28 DIAGNOSIS — Z76.89 POOR DENTITION REQUIRING REFERRAL TO DENTISTRY: ICD-10-CM

## 2023-09-28 DIAGNOSIS — I48.0 PAROXYSMAL ATRIAL FIBRILLATION WITH RAPID VENTRICULAR RESPONSE (HCC): ICD-10-CM

## 2023-09-28 DIAGNOSIS — I10 ESSENTIAL HYPERTENSION: ICD-10-CM

## 2023-09-28 DIAGNOSIS — E05.90 HYPERTHYROIDISM, SUBCLINICAL: ICD-10-CM

## 2023-09-28 PROCEDURE — 99496 TRANSJ CARE MGMT HIGH F2F 7D: CPT | Performed by: FAMILY MEDICINE

## 2023-09-28 RX ORDER — PANTOPRAZOLE SODIUM 40 MG/1
40 TABLET, DELAYED RELEASE ORAL DAILY
Qty: 90 TABLET | Refills: 0 | Status: SHIPPED | OUTPATIENT
Start: 2023-09-28

## 2023-09-28 RX ORDER — NICOTINE 21 MG/24HR
1 PATCH, TRANSDERMAL 24 HOURS TRANSDERMAL EVERY 24 HOURS
Qty: 28 PATCH | Refills: 0 | Status: SHIPPED | OUTPATIENT
Start: 2023-09-28

## 2023-09-28 RX ORDER — OMEPRAZOLE 20 MG/1
20 CAPSULE, DELAYED RELEASE ORAL DAILY
Qty: 90 CAPSULE | Refills: 0 | Status: CANCELLED | OUTPATIENT
Start: 2023-09-28

## 2023-09-28 NOTE — ASSESSMENT & PLAN NOTE
Recent hospitalization d/t chest pain and afib. Denies any recent SOB, palpitations, or cardiac related chest pain. · Follow up with cardiology for stress testing  · Continue Metoprolol and Eliquis as prescribed.

## 2023-09-28 NOTE — PROGRESS NOTES
Assessment & Plan     1. Gastroesophageal reflux disease, unspecified whether esophagitis present  Assessment & Plan:  Intermittent chest pain worse after eating and while laying down 6/10. Smoking 1ppd, continues to take chantix, plans to restart nicotine patches. · Plans to follow-up with GI for EGD  · Starting PPI  · Goals for diet modification, increase exercise and smoking cessation discussed    Orders:  -     pantoprazole (PROTONIX) 40 mg tablet; Take 1 tablet (40 mg total) by mouth daily    2. Encounter for smoking cessation counseling  -     nicotine (NICODERM CQ) 21 mg/24 hr TD 24 hr patch; Place 1 patch on the skin over 24 hours every 24 hours    3. Abnormal thyroid stimulating hormone level  Assessment & Plan:  Low TSH on recent hospital admission possible subclinical hyperthyroid. Denies heat intolerance, diaphoresis, unintentional weight loss. · Follow-up TSH level    Orders:  -     TSH, 3rd generation with Free T4 reflex; Future    4. Poor dentition requiring referral to dentistry  -     Ambulatory Referral to Dentistry; Future    5. Hyperthyroidism, subclinical  -     TSH, 3rd generation with Free T4 reflex; Future    6. Essential hypertension  Assessment & Plan:  Current visit /58. Denies any history of syncope, lightheadedness, dizziness. · Continue Metoprolol and Lisinopril  · Discontinue Amlodipine  · Educated patient on weight management with diet and exercise      7. Paroxysmal atrial fibrillation with rapid ventricular response (HCC)  Assessment & Plan:  Recent hospitalization d/t chest pain and afib. Denies any recent SOB, palpitations, or cardiac related chest pain. · Follow up with cardiology for stress testing  · Continue Metoprolol and Eliquis as prescribed. Subjective     Transitional Care Management Review:   Alex David is a 66 y.o. male here for TCM follow up.      During the TCM phone call patient stated:  TCM Call     Date and time call was made 10/28/2021  3:18 PM    Hospital care reviewed  Records reviewed    Patient was hospitialized at  [facility]    Date of Admission  10/10/22    Date of discharge  10/14/22    Diagnosis  Alcohol withdrawal    Disposition  --  patient left AMA    Were the patients medications reviewed and updated  No    Current Symptoms  None    Loose stool severity  Severe      TCM Call     Post hospital issues  None    Should patient be enrolled in anticoag monitoring? No    Scheduled for follow up? Not clinically warranted  patient left AMA    Not clinically warranted  Patient currently admitted at [facility]     Did you obtain your prescribed medications  No    Why were you unable to obtain your medications  Vancomycin requires prior auth. Florastor and magnesium are OTC and not covered by insurance. Do you need help managing your prescriptions or medications  No    Is transportation to your appointment needed  Yes    Specify why  would like transport to inpatient rehab, referred to [address]    I have advised the patient to call PCP with any new or worsening symptoms  [facility]    Living Arrangements  Alone    Counseling  Patient    Comments  Pt called x2, no answer, LMOM x2. TCM appt 4/21/22 @0900        Ar Lindquist is a 66 y.o. male here for TCM follow up for chest pain and atrial fibrillation. Patient endorses improvement of chest pain and seems enthusiastic about lifestyle modification options regarding diet, exercise and smoking cessation. Review of Systems   Constitutional: Negative for chills, diaphoresis, fatigue, fever and unexpected weight change. HENT: Positive for dental problem (missing teeth and poor dental hygeine). Negative for ear pain and sore throat. Eyes: Negative for pain and visual disturbance. Respiratory: Negative for cough, chest tightness, shortness of breath and wheezing. Cardiovascular: Positive for chest pain (post-prandial and while supine).  Negative for palpitations. Gastrointestinal: Negative for abdominal pain, constipation, diarrhea, nausea and vomiting. Endocrine: Negative for cold intolerance and heat intolerance. Genitourinary: Negative for dysuria and hematuria. Musculoskeletal: Negative for arthralgias and back pain. Skin: Negative for color change and rash. Neurological: Negative for dizziness, seizures, syncope, light-headedness and headaches. All other systems reviewed and are negative. Objective     /58 (BP Location: Left arm, Patient Position: Sitting, Cuff Size: Extra-Large)   Pulse 82   Temp (!) 96.6 °F (35.9 °C) (Tympanic)   Ht 5' 11" (1.803 m)   Wt 98.7 kg (217 lb 9.6 oz)   SpO2 99%   BMI 30.35 kg/m²      Physical Exam  Vitals and nursing note reviewed. Constitutional:       General: He is not in acute distress. Appearance: He is well-developed. HENT:      Head: Normocephalic and atraumatic. Mouth/Throat:      Pharynx: Oropharynx is clear. Comments: Poor dentition  Eyes:      Conjunctiva/sclera: Conjunctivae normal.      Pupils: Pupils are equal, round, and reactive to light. Cardiovascular:      Rate and Rhythm: Normal rate and regular rhythm. Heart sounds: No murmur heard. Pulmonary:      Effort: Pulmonary effort is normal. No respiratory distress. Breath sounds: Normal breath sounds. Abdominal:      Palpations: Abdomen is soft. Tenderness: There is no abdominal tenderness. Musculoskeletal:         General: No swelling. Cervical back: Neck supple. Skin:     General: Skin is warm and dry. Capillary Refill: Capillary refill takes less than 2 seconds. Neurological:      General: No focal deficit present. Mental Status: He is alert and oriented to person, place, and time.    Psychiatric:         Mood and Affect: Mood normal.       Medications have been reviewed by provider in current encounter    Alex Pollard MD

## 2023-09-28 NOTE — ASSESSMENT & PLAN NOTE
Current visit /58. Denies any history of syncope, lightheadedness, dizziness.    · Continue Metoprolol and Lisinopril  · Discontinue Amlodipine  · Educated patient on weight management with diet and exercise

## 2023-09-28 NOTE — ASSESSMENT & PLAN NOTE
Intermittent chest pain worse after eating and while laying down 6/10. Smoking 1ppd, continues to take chantix, plans to restart nicotine patches.      · Plans to follow-up with GI for EGD  · Starting PPI  · Goals for diet modification, increase exercise and smoking cessation discussed

## 2023-09-28 NOTE — ASSESSMENT & PLAN NOTE
Low TSH on recent hospital admission possible subclinical hyperthyroid. Denies heat intolerance, diaphoresis, unintentional weight loss.     · Follow-up TSH level

## 2023-09-28 NOTE — TELEPHONE ENCOUNTER
Patients GI provider:  Dr. Olena Murphy    Number to return call: [phone]    Reason for call: Pt calling to sched EGD/colonoscopy. Pt was in hospital and recently got discharged. Connection was bad, please call to sched. Pt advised he is having issues with his phone and to leave a message if he does not  as the call does not always go through.     Scheduled procedure/appointment date if applicable: N/A

## 2023-10-02 DIAGNOSIS — F10.10 ALCOHOL ABUSE: Chronic | ICD-10-CM

## 2023-10-02 RX ORDER — LANOLIN ALCOHOL/MO/W.PET/CERES
CREAM (GRAM) TOPICAL
Qty: 60 TABLET | Refills: 2 | Status: SHIPPED | OUTPATIENT
Start: 2023-10-02

## 2023-10-04 ENCOUNTER — TELEPHONE (OUTPATIENT)
Dept: FAMILY MEDICINE CLINIC | Facility: CLINIC | Age: 63
End: 2023-10-04

## 2023-10-04 NOTE — TELEPHONE ENCOUNTER
Message left on Clinical Line-    My name is Isaías Pabon. My date of birth is [birth year]. My phone number is [phone]. The last time I was there, the doctor took me off one of my blood pressure medications and was supposed to call a new one into the [facility] I haven't received. It's been over a week. Call again. You received a voice mail from [facility]. This message is being routed to provider last seen 9/28/23;    Please see patients message.    Looks like Amlodipine was D/c 9/28/23

## 2023-10-05 DIAGNOSIS — Z95.1 HX OF CABG: Chronic | ICD-10-CM

## 2023-10-05 DIAGNOSIS — I10 HYPERTENSION: ICD-10-CM

## 2023-10-05 DIAGNOSIS — I48.0 PAROXYSMAL ATRIAL FIBRILLATION (HCC): ICD-10-CM

## 2023-10-05 RX ORDER — LISINOPRIL 20 MG/1
10 TABLET ORAL DAILY
Qty: 60 TABLET | Refills: 0 | Status: SHIPPED | OUTPATIENT
Start: 2023-10-05

## 2023-10-05 NOTE — PROGRESS NOTES
Attempted to call patient twice to reeducate on the current plan. As discussed on our 9/28 encounter due to more than sufficient BP control, a trial of reducing the current BP medication regimen is indicated. Last BP was 101/58. During last encounter patient denied any history of syncope, lightheadedness, or dizziness. The current regimen is as follows.   • Continue Metoprolol 25mg PO BID and Lisinopril 10mg PO QD  • Discontinue Amlodipine  • Encourage weight management with diet and exercise

## 2023-10-12 ENCOUNTER — TELEPHONE (OUTPATIENT)
Dept: CARDIOLOGY CLINIC | Facility: CLINIC | Age: 63
End: 2023-10-12

## 2023-10-12 NOTE — TELEPHONE ENCOUNTER
Pt was in the hospital, on his AVS it shows pt is to continue amlodipine however on his med list it shows it was dc'ed on 9/22/23

## 2023-10-12 NOTE — TELEPHONE ENCOUNTER
Patient of Dr Tere Fabian called with issues regarding being taken off Amlodipine 3 weeks ago. He is not sure who took him off.  Bp low

## 2023-10-12 NOTE — TELEPHONE ENCOUNTER
Huh?  When I look at the list it is continued. Yes he is to continue the Norvasc 5 mg daily.   Thanks

## 2023-10-13 ENCOUNTER — APPOINTMENT (EMERGENCY)
Dept: RADIOLOGY | Facility: HOSPITAL | Age: 63
End: 2023-10-13
Payer: COMMERCIAL

## 2023-10-13 ENCOUNTER — HOSPITAL ENCOUNTER (EMERGENCY)
Facility: HOSPITAL | Age: 63
Discharge: HOME | End: 2023-10-14
Attending: STUDENT IN AN ORGANIZED HEALTH CARE EDUCATION/TRAINING PROGRAM
Payer: COMMERCIAL

## 2023-10-13 DIAGNOSIS — R07.9 CHEST PAIN: Primary | ICD-10-CM

## 2023-10-13 DIAGNOSIS — E83.42 HYPOMAGNESEMIA: ICD-10-CM

## 2023-10-13 LAB
2HR DELTA HS TROPONIN: -1 NG/L
ALBUMIN SERPL BCP-MCNC: 4 G/DL (ref 3.5–5)
ALP SERPL-CCNC: 52 U/L (ref 34–104)
ALT SERPL W P-5'-P-CCNC: 45 U/L (ref 7–52)
ANION GAP SERPL CALCULATED.3IONS-SCNC: 11 MMOL/L
AST SERPL W P-5'-P-CCNC: 33 U/L (ref 13–39)
BASOPHILS # BLD AUTO: 0.05 THOUSANDS/ÂΜL (ref 0–0.1)
BASOPHILS NFR BLD AUTO: 1 % (ref 0–1)
BILIRUB SERPL-MCNC: 0.49 MG/DL (ref 0.2–1)
BUN SERPL-MCNC: 27 MG/DL (ref 5–25)
CALCIUM SERPL-MCNC: 8.8 MG/DL (ref 8.4–10.2)
CARDIAC TROPONIN I PNL SERPL HS: 15 NG/L
CARDIAC TROPONIN I PNL SERPL HS: 16 NG/L
CHLORIDE SERPL-SCNC: 98 MMOL/L (ref 96–108)
CO2 SERPL-SCNC: 23 MMOL/L (ref 21–32)
CREAT SERPL-MCNC: 1.23 MG/DL (ref 0.6–1.3)
EOSINOPHIL # BLD AUTO: 0.34 THOUSAND/ÂΜL (ref 0–0.61)
EOSINOPHIL NFR BLD AUTO: 4 % (ref 0–6)
ERYTHROCYTE [DISTWIDTH] IN BLOOD BY AUTOMATED COUNT: 13 % (ref 11.6–15.1)
GFR SERPL CREATININE-BSD FRML MDRD: 62 ML/MIN/1.73SQ M
GLUCOSE SERPL-MCNC: 121 MG/DL (ref 65–140)
HCT VFR BLD AUTO: 38 % (ref 36.5–49.3)
HGB BLD-MCNC: 13.4 G/DL (ref 12–17)
IMM GRANULOCYTES # BLD AUTO: 0.03 THOUSAND/UL (ref 0–0.2)
IMM GRANULOCYTES NFR BLD AUTO: 0 % (ref 0–2)
LYMPHOCYTES # BLD AUTO: 1.8 THOUSANDS/ÂΜL (ref 0.6–4.47)
LYMPHOCYTES NFR BLD AUTO: 21 % (ref 14–44)
MAGNESIUM SERPL-MCNC: 1.5 MG/DL (ref 1.9–2.7)
MCH RBC QN AUTO: 34.4 PG (ref 26.8–34.3)
MCHC RBC AUTO-ENTMCNC: 35.3 G/DL (ref 31.4–37.4)
MCV RBC AUTO: 98 FL (ref 82–98)
MONOCYTES # BLD AUTO: 0.95 THOUSAND/ÂΜL (ref 0.17–1.22)
MONOCYTES NFR BLD AUTO: 11 % (ref 4–12)
NEUTROPHILS # BLD AUTO: 5.34 THOUSANDS/ÂΜL (ref 1.85–7.62)
NEUTS SEG NFR BLD AUTO: 63 % (ref 43–75)
NRBC BLD AUTO-RTO: 0 /100 WBCS
PLATELET # BLD AUTO: 177 THOUSANDS/UL (ref 149–390)
PMV BLD AUTO: 10.2 FL (ref 8.9–12.7)
POTASSIUM SERPL-SCNC: 4.1 MMOL/L (ref 3.5–5.3)
PROT SERPL-MCNC: 7 G/DL (ref 6.4–8.4)
RBC # BLD AUTO: 3.89 MILLION/UL (ref 3.88–5.62)
SODIUM SERPL-SCNC: 132 MMOL/L (ref 135–147)
WBC # BLD AUTO: 8.51 THOUSAND/UL (ref 4.31–10.16)

## 2023-10-13 PROCEDURE — 99285 EMERGENCY DEPT VISIT HI MDM: CPT | Performed by: STUDENT IN AN ORGANIZED HEALTH CARE EDUCATION/TRAINING PROGRAM

## 2023-10-13 PROCEDURE — 71045 X-RAY EXAM CHEST 1 VIEW: CPT

## 2023-10-13 PROCEDURE — 93005 ELECTROCARDIOGRAM TRACING: CPT

## 2023-10-13 PROCEDURE — 36415 COLL VENOUS BLD VENIPUNCTURE: CPT | Performed by: STUDENT IN AN ORGANIZED HEALTH CARE EDUCATION/TRAINING PROGRAM

## 2023-10-13 PROCEDURE — 85025 COMPLETE CBC W/AUTO DIFF WBC: CPT | Performed by: STUDENT IN AN ORGANIZED HEALTH CARE EDUCATION/TRAINING PROGRAM

## 2023-10-13 PROCEDURE — 80053 COMPREHEN METABOLIC PANEL: CPT | Performed by: STUDENT IN AN ORGANIZED HEALTH CARE EDUCATION/TRAINING PROGRAM

## 2023-10-13 PROCEDURE — 84484 ASSAY OF TROPONIN QUANT: CPT | Performed by: STUDENT IN AN ORGANIZED HEALTH CARE EDUCATION/TRAINING PROGRAM

## 2023-10-13 PROCEDURE — 83735 ASSAY OF MAGNESIUM: CPT | Performed by: STUDENT IN AN ORGANIZED HEALTH CARE EDUCATION/TRAINING PROGRAM

## 2023-10-13 RX ORDER — KETOROLAC TROMETHAMINE 30 MG/ML
15 INJECTION, SOLUTION INTRAMUSCULAR; INTRAVENOUS ONCE
Status: COMPLETED | OUTPATIENT
Start: 2023-10-13 | End: 2023-10-13

## 2023-10-13 RX ORDER — MAGNESIUM SULFATE HEPTAHYDRATE 40 MG/ML
2 INJECTION, SOLUTION INTRAVENOUS ONCE
Status: COMPLETED | OUTPATIENT
Start: 2023-10-13 | End: 2023-10-13

## 2023-10-13 RX ORDER — SODIUM CHLORIDE 9 MG/ML
3 INJECTION INTRAVENOUS
Status: DISCONTINUED | OUTPATIENT
Start: 2023-10-13 | End: 2023-10-14 | Stop reason: HOSPADM

## 2023-10-13 RX ADMIN — MAGNESIUM SULFATE HEPTAHYDRATE 2 G: 40 INJECTION, SOLUTION INTRAVENOUS at 21:29

## 2023-10-13 RX ADMIN — METOPROLOL TARTRATE 25 MG: 25 TABLET, FILM COATED ORAL at 21:47

## 2023-10-13 RX ADMIN — KETOROLAC TROMETHAMINE 15 MG: 30 INJECTION, SOLUTION INTRAMUSCULAR at 21:47

## 2023-10-13 RX ADMIN — SODIUM CHLORIDE 1000 ML: 0.9 INJECTION, SOLUTION INTRAVENOUS at 21:48

## 2023-10-14 VITALS
BODY MASS INDEX: 30.35 KG/M2 | RESPIRATION RATE: 18 BRPM | DIASTOLIC BLOOD PRESSURE: 57 MMHG | HEART RATE: 70 BPM | OXYGEN SATURATION: 98 % | TEMPERATURE: 97.4 F | HEIGHT: 71 IN | SYSTOLIC BLOOD PRESSURE: 106 MMHG

## 2023-10-14 NOTE — ED PROVIDER NOTES
History  Chief Complaint   Patient presents with    Chest Pain     Patient c/o pressure in his upper chest starting 3 days ago. Also has pain in his upper/lower back. Hx of a.fib     Pt is a 67 yo M, Pmhx including A-fib on Eliquis, COPD, hyperlipidemia, hypertension, and CAD, who presents to the emergency department for chest pain. Started several weeks ago but has become worse over the past 3 or so days. No modifying factors. No associated symptoms. States this is similar to pain he had when he was recently admitted. Does endorse shortness of breath but states he has a history of shortness of breath and it is not worsened. Denies any fevers or chills. No other complaints or concerns. Chart reviewed. Patient with recent admission to the hospital on 9/20. Initially presented for chest pain and shortness of breath. Was found to be in A-fib with RVR. Initial troponin was 19. D-dimer was elevated so he underwent a CTA that was negative. He was admitted. Discharged on 9/22. Prior to Admission Medications   Prescriptions Last Dose Informant Patient Reported? Taking? Blood Glucose Monitoring Suppl (ONE TOUCH ULTRA MINI) w/Device KIT  Self Yes No   Sig: Use as directed   Breo Ellipta 200-25 MCG/ACT inhaler   No No   Sig: Inhale 1 puff daily Rinse mouth after use.    ONETOUCH DELICA LANCETS FINE MISC  Self Yes No   Sig: 3 (three) times a day Test   Thiamine HCl (vitamin B-1) 100 MG TABS  Self No No   Sig: TAKE 1 TABLET DAILY   albuterol (2.5 mg/3 mL) 0.083 % nebulizer solution   No No   Sig: Take 3 mL (2.5 mg total) by nebulization every 6 (six) hours as needed for wheezing or shortness of breath   albuterol (Ventolin HFA) 90 mcg/act inhaler   No No   Sig: Inhale 2 puffs every 4 (four) hours as needed for wheezing   apixaban (ELIQUIS) 5 mg   No No   Sig: Take 1 tablet (5 mg total) by mouth 2 (two) times a day   aspirin (ECOTRIN LOW STRENGTH) 81 mg EC tablet   Yes No   Sig: Take 81 mg by mouth daily ferrous sulfate 325 (65 Fe) mg tablet   No No   Sig: Take 1 tablet (325 mg total) by mouth daily with breakfast   folic acid (FOLVITE) 1 mg tablet   No No   Sig: TAKE 1 TABLET DAILY   gabapentin (NEURONTIN) 300 mg capsule   No No   Sig: TAKE ONE CAPSULE THREE TIMES DAILY   lisinopril (ZESTRIL) 20 mg tablet   No No   Sig: Take 0.5 tablets (10 mg total) by mouth daily   magnesium Oxide (MAG-OX) 400 mg TABS   No No   Sig: TAKE 1 TABLET TWO TIMES A DAY   metFORMIN (GLUCOPHAGE) 500 mg tablet   No No   Sig: Take 1 tablet (500 mg total) by mouth daily with breakfast Do not start before September 23, 2023. metoprolol tartrate (LOPRESSOR) 25 mg tablet   No No   Sig: Take 1 tablet (25 mg total) by mouth every 12 (twelve) hours   naltrexone (REVIA) 50 mg tablet   No No   Sig: Take 1 tablet (50 mg total) by mouth daily Do not start before May 14, 2023.    nicotine (NICODERM CQ) 21 mg/24 hr TD 24 hr patch   No No   Sig: Place 1 patch on the skin over 24 hours every 24 hours   pantoprazole (PROTONIX) 40 mg tablet   No No   Sig: Take 1 tablet (40 mg total) by mouth daily   pravastatin (PRAVACHOL) 40 mg tablet   No No   Sig: TAKE 1 TABLET DAILY WITH DINNER   ranolazine (RANEXA) 500 mg 12 hr tablet   No No   Sig: TAKE 1 TABLET EVERY 12 HOURS   tamsulosin (FLOMAX) 0.4 mg   No No   Sig: TAKE 1 CAPSULE DAILY   varenicline (CHANTIX) 1 mg tablet   No No   Sig: TAKE 1 TABLET 2 TIMES A DAY      Facility-Administered Medications: None       Past Medical History:   Diagnosis Date    Acute on chronic kidney failure      Alcohol withdrawal ([address]) 06/07/2019    Atrial fibrillation (HCC)     Cancer (HCC)     prostate ca,had radiation    Cardiac disease     stents,then triple bypass    COPD (chronic obstructive pulmonary disease) (HCC)     ETOH abuse     Hx of heart artery stent     2014    Hyperlipidemia     Hypertension     Hypovolemic shock ([address]) 12/22/2019    Lumbar spondylitis ([address]) 10/13/2022    Nasal bone fracture 10/10/2022    Prostate CA ([address] )     S/P CABG x 3     2004    Sleep apnea        Past Surgical History:   Procedure Laterality Date    CARDIAC CATHETERIZATION      2 stents    CORONARY ARTERY BYPASS GRAFT  2004    DE ARTHRD ANT INTERBODY MIN [address] CRV BELOW C2 N/A 12/16/2020    Procedure: Anterior cervical discectomy with fusion C4-C7; Posterior cervical decompression and fusion C2-T2;  Surgeon: Regan Hodges MD;  Location: [facility];  Service: Neurosurgery    TONSILLECTOMY         Family History   Problem Relation Age of Onset    Diabetes Mother     Uterine cancer Mother     COPD Father     Hypertension Father      I have reviewed and agree with the history as documented. E-Cigarette/Vaping    E-Cigarette Use Never User      E-Cigarette/Vaping Substances    Nicotine Yes     THC No     CBD No     Flavoring No     Other No     Unknown No      Social History     Tobacco Use    Smoking status: Every Day     Packs/day: 1.50     Years: 40.00     Total pack years: 60.00     Types: Cigarettes    Smokeless tobacco: Never   Vaping Use    Vaping Use: Never used   Substance Use Topics    Alcohol use: Not Currently     Alcohol/week: 4.0 standard drinks of alcohol     Types: 4 Standard drinks or equivalent per week    Drug use: No       Review of Systems   Constitutional:  Negative for chills and fever. Respiratory:  Positive for shortness of breath. Cardiovascular:  Positive for chest pain. Gastrointestinal:  Negative for nausea and vomiting. All other systems reviewed and are negative. Physical Exam  Physical Exam  Vitals and nursing note reviewed. Constitutional:       General: He is not in acute distress. Appearance: He is well-developed. He is not ill-appearing, toxic-appearing or diaphoretic. HENT:      Head: Normocephalic and atraumatic. Right Ear: External ear normal.      Left Ear: External ear normal.      Nose: Nose normal.   Eyes:      General: Lids are normal. No scleral icterus.   Cardiovascular:      Rate and Rhythm: Normal rate and regular rhythm. Heart sounds: Normal heart sounds. No murmur heard. No friction rub. No gallop. Pulmonary:      Effort: Pulmonary effort is normal. No respiratory distress. Breath sounds: Normal breath sounds. No wheezing or rales. Abdominal:      Palpations: Abdomen is soft. Tenderness: There is no abdominal tenderness. There is no guarding or rebound. Musculoskeletal:         General: No deformity. Normal range of motion. Cervical back: Normal range of motion and neck supple. Skin:     General: Skin is warm and dry. Neurological:      General: No focal deficit present. Mental Status: He is alert. Psychiatric:         Mood and Affect: Mood normal.         Behavior: Behavior normal.         Vital Signs  ED Triage Vitals [10/13/23 2041]   Temperature Pulse Respirations Blood Pressure SpO2   (!) 97.4 °F (36.3 °C) 79 20 120/58 95 %      Temp Source Heart Rate Source Patient Position - Orthostatic VS BP Location FiO2 (%)   Oral Monitor Sitting Right arm --      Pain Score       7           Vitals:    10/13/23 2200 10/13/23 2215 10/13/23 2230 10/13/23 2245   BP: 108/55 101/52 109/54 107/53   Pulse: 65 65 67 68   Patient Position - Orthostatic VS: Lying Lying  Lying         Visual Acuity      ED Medications  Medications   sodium chloride (PF) 0.9 % injection 3 mL (has no administration in time range)   magnesium sulfate 2 g/50 mL IVPB (premix) 2 g (2 g Intravenous New Bag 10/13/23 2129)   metoprolol tartrate (LOPRESSOR) tablet 25 mg (25 mg Oral Given 10/13/23 2147)   sodium chloride 0.9 % bolus 1,000 mL (0 mL Intravenous Stopped 10/13/23 2252)   ketorolac (TORADOL) injection 15 mg (15 mg Intravenous Given 10/13/23 2147)       Diagnostic Studies  Results Reviewed       Procedure Component Value Units Date/Time    HS Troponin I 2hr [[id number]] Collected: 10/13/23 2250    Lab Status:  In process Specimen: Blood from Arm, Left Updated: 10/13/23 2253    HS Troponin I 4hr [[id number]]     Lab Status: No result Specimen: Blood     HS Troponin 0hr (reflex protocol) [[id number]]  (Normal) Collected: 10/13/23 2049    Lab Status: Final result Specimen: Blood from Arm, Left Updated: 10/13/23 2116     hs TnI 0hr 16 ng/L     Comprehensive metabolic panel [[id number]]  (Abnormal) Collected: 10/13/23 2049    Lab Status: Final result Specimen: Blood from Arm, Left Updated: 10/13/23 2113     Sodium 132 mmol/L      Potassium 4.1 mmol/L      Chloride 98 mmol/L      CO2 23 mmol/L      ANION GAP 11 mmol/L      BUN 27 mg/dL      Creatinine 1.23 mg/dL      Glucose 121 mg/dL      Calcium 8.8 mg/dL      AST 33 U/L      ALT 45 U/L      Alkaline Phosphatase 52 U/L      Total Protein 7.0 g/dL      Albumin 4.0 g/dL      Total Bilirubin 0.49 mg/dL      eGFR 62 ml/min/1.73sq m     Narrative:      [place] guidelines for Chronic Kidney Disease (CKD):     Stage 1 with normal or high GFR (GFR > 90 mL/min/1.73 square meters)    Stage 2 Mild CKD (GFR = 60-89 mL/min/1.73 square meters)    Stage 3A Moderate CKD (GFR = 45-59 mL/min/1.73 square meters)    Stage 3B Moderate CKD (GFR = 30-44 mL/min/1.73 square meters)    Stage 4 Severe CKD (GFR = 15-29 mL/min/1.73 square meters)    Stage 5 End Stage CKD (GFR <15 mL/min/1.73 square meters)  Note: GFR calculation is accurate only with a steady state creatinine    Magnesium [[id number]]  (Abnormal) Collected: 10/13/23 2049    Lab Status: Final result Specimen: Blood from Arm, Left Updated: 10/13/23 2113     Magnesium 1.5 mg/dL     CBC and differential [[id number]]  (Abnormal) Collected: 10/13/23 2049    Lab Status: Final result Specimen: Blood from Arm, Left Updated: 10/13/23 2055     WBC 8.51 Thousand/uL      RBC 3.89 Million/uL      Hemoglobin 13.4 g/dL      Hematocrit 38.0 %      MCV 98 fL      MCH 34.4 pg      MCHC 35.3 g/dL      RDW 13.0 %      MPV 10.2 fL      Platelets 009 Thousands/uL      nRBC 0 /100 WBCs      Neutrophils Relative 63 %      Immat GRANS % 0 %      Lymphocytes Relative 21 %      Monocytes Relative 11 %      Eosinophils Relative 4 %      Basophils Relative 1 %      Neutrophils Absolute 5.34 Thousands/µL      Immature Grans Absolute 0.03 Thousand/uL      Lymphocytes Absolute 1.80 Thousands/µL      Monocytes Absolute 0.95 Thousand/µL      Eosinophils Absolute 0.34 Thousand/µL      Basophils Absolute 0.05 Thousands/µL                    X-ray chest 1 view portable    (Results Pending)              Procedures  ECG 12 Lead Documentation Only    Date/Time: 10/13/2023 10:01 PM    Performed by: Jono Alvarado DO  Authorized by: Jono Alvarado DO    ECG reviewed by me, the ED Provider: yes    Patient location:  ED  Interpretation:     Interpretation: normal    Rate:     ECG rate:  78    ECG rate assessment: normal    Rhythm:     Rhythm: sinus rhythm    Ectopy:     Ectopy: none    QRS:     QRS axis:  Normal  Conduction:     Conduction: normal    ST segments:     ST segments:  Normal  T waves:     T waves: normal             ED Course  ED Course as of 10/13/23 2300   Fri Oct 13, 2023   2104 WBC: 8.51   2104 Hemoglobin: 13.4   2117 hs TnI 0hr: 16   2300 Pt signed out to Dr Lesli Argueta Pending delta troponin. HEART Risk Score      Flowsheet Row Most Recent Value   Heart Score Risk Calculator    History 0 Filed at: 10/13/2023 2300   ECG 1 Filed at: 10/13/2023 2300   Age 1 Filed at: 10/13/2023 2300   Risk Factors 2 Filed at: 10/13/2023 2300   Troponin 1 Filed at: 10/13/2023 2300   HEART Score 5 Filed at: 10/13/2023 2300                                        Medical Decision Making  Patient is a 66 y.o. male who presents to the ED for chest pain. Patient is nontoxic, well-appearing. Vitals are stable. Physical exam is unremarkable. Exam without evidence of volume overload. EKG without signs of active ischemia. Differential includes angina GERD, msk pain.  Presentation not consistent with acute PE (Kev low risk, on eliquis), pneumothorax, thoracic arotic dissection, cardiac effusion or tamponade. Will eval for NSTEMI with troponin. Plan: Labs, troponin(s), EKG, CXR, pain control, serial reassessment             Portions of the record may have been created with voice recognition software. Occasional wrong word or "sound a like" substitutions may have occurred due to the inherent limitations of voice recognition software. Read the chart carefully and recognize, using context, where substitutions have occurred. Amount and/or Complexity of Data Reviewed  Labs: ordered. Decision-making details documented in ED Course. Radiology: ordered. Risk  Prescription drug management. Disposition  Final diagnoses:   Chest pain   Hypomagnesemia     Time reflects when diagnosis was documented in both MDM as applicable and the Disposition within this note       Time User Action Codes Description Comment    10/13/2023 10:59 PM Lauren Channing Add [R07.9] Chest pain     10/13/2023 11:00 PM Lauren Channing Add [E83.42] Hypomagnesemia           ED Disposition       ED Disposition   Discharge    Condition   Stable    Date/Time   Fri Oct 13, 2023 2239    Comment   Rei Del Rosario discharge to home/self care. Follow-up Information    None         Patient's Medications   Discharge Prescriptions    No medications on file       No discharge procedures on file.     PDMP Review         Value Time User    PDMP Reviewed  Yes 7/26/2021  4:50 PM Martin Goel MD            ED Provider  Electronically Signed by             Pete Johns DO  10/13/23 4695

## 2023-10-14 NOTE — ED NOTES
Pt discharged home, ride set up by ED. Pt able to dress himself and is ambulatory with steady gait to waiting area. Discharge instructions reviewed with patient.       Sebastian Swanson RN  10/14/23 1695

## 2023-10-15 LAB
ATRIAL RATE: 78 BPM
P AXIS: 65 DEGREES
PR INTERVAL: 128 MS
QRS AXIS: 77 DEGREES
QRSD INTERVAL: 86 MS
QT INTERVAL: 366 MS
QTC INTERVAL: 417 MS
T WAVE AXIS: 48 DEGREES
VENTRICULAR RATE: 78 BPM

## 2023-10-15 PROCEDURE — 93010 ELECTROCARDIOGRAM REPORT: CPT | Performed by: INTERNAL MEDICINE

## 2023-10-16 ENCOUNTER — VBI (OUTPATIENT)
Dept: FAMILY MEDICINE CLINIC | Facility: CLINIC | Age: 63
End: 2023-10-16

## 2023-10-16 NOTE — TELEPHONE ENCOUNTER
10/16/23 11:14 AM    Patient contacted post ED visit, first outreach attempt made. Message was left for patient to return a call to the [facility]BI Department at [facility]: Phone [phone]. Thank you.   Ricardo Saenz  [facility]

## 2023-10-17 NOTE — TELEPHONE ENCOUNTER
10/17/23 12:07 PM    Patient contacted post ED visit, outreach attempt made, person answered, stated patient is not available and hung up. Thank you.   Lilian Hernandez  [facility]

## 2023-10-29 ENCOUNTER — HOSPITAL ENCOUNTER (INPATIENT)
Facility: HOSPITAL | Age: 63
LOS: 1 days | Discharge: LEFT BEFORE DISCHARGE | DRG: 378 | End: 2023-10-30
Attending: EMERGENCY MEDICINE | Admitting: INTERNAL MEDICINE
Payer: COMMERCIAL

## 2023-10-29 DIAGNOSIS — N19 RENAL FAILURE: ICD-10-CM

## 2023-10-29 DIAGNOSIS — R55 NEAR SYNCOPE: Primary | ICD-10-CM

## 2023-10-29 DIAGNOSIS — E83.42 HYPOMAGNESEMIA: ICD-10-CM

## 2023-10-29 DIAGNOSIS — E87.1 HYPONATREMIA: ICD-10-CM

## 2023-10-29 DIAGNOSIS — K92.1 MELENA: ICD-10-CM

## 2023-10-29 PROBLEM — E87.20 METABOLIC ACIDOSIS: Status: ACTIVE | Noted: 2021-04-21

## 2023-10-29 LAB
2HR DELTA HS TROPONIN: -1 NG/L
ALBUMIN SERPL BCP-MCNC: 4.1 G/DL (ref 3.5–5)
ALP SERPL-CCNC: 58 U/L (ref 34–104)
ALT SERPL W P-5'-P-CCNC: 40 U/L (ref 7–52)
ANION GAP SERPL CALCULATED.3IONS-SCNC: 15 MMOL/L
AST SERPL W P-5'-P-CCNC: 51 U/L (ref 13–39)
BASOPHILS # BLD AUTO: 0.04 THOUSANDS/ÂΜL (ref 0–0.1)
BASOPHILS NFR BLD AUTO: 0 % (ref 0–1)
BILIRUB SERPL-MCNC: 0.93 MG/DL (ref 0.2–1)
BILIRUB UR QL STRIP: ABNORMAL
BUN SERPL-MCNC: 46 MG/DL (ref 5–25)
CALCIUM SERPL-MCNC: 8.7 MG/DL (ref 8.4–10.2)
CARDIAC TROPONIN I PNL SERPL HS: 37 NG/L
CARDIAC TROPONIN I PNL SERPL HS: 38 NG/L
CHLORIDE SERPL-SCNC: 91 MMOL/L (ref 96–108)
CLARITY UR: CLEAR
CO2 SERPL-SCNC: 20 MMOL/L (ref 21–32)
COLOR UR: YELLOW
CREAT SERPL-MCNC: 2.11 MG/DL (ref 0.6–1.3)
EOSINOPHIL # BLD AUTO: 0.28 THOUSAND/ÂΜL (ref 0–0.61)
EOSINOPHIL NFR BLD AUTO: 3 % (ref 0–6)
ERYTHROCYTE [DISTWIDTH] IN BLOOD BY AUTOMATED COUNT: 13.2 % (ref 11.6–15.1)
ETHANOL SERPL-MCNC: <10 MG/DL
FLUAV RNA RESP QL NAA+PROBE: NEGATIVE
FLUBV RNA RESP QL NAA+PROBE: NEGATIVE
GFR SERPL CREATININE-BSD FRML MDRD: 32 ML/MIN/1.73SQ M
GLUCOSE SERPL-MCNC: 102 MG/DL (ref 65–140)
GLUCOSE SERPL-MCNC: 115 MG/DL (ref 65–140)
GLUCOSE UR STRIP-MCNC: NEGATIVE MG/DL
HCT VFR BLD AUTO: 36.4 % (ref 36.5–49.3)
HGB BLD-MCNC: 12.8 G/DL (ref 12–17)
HGB UR QL STRIP.AUTO: NEGATIVE
IMM GRANULOCYTES # BLD AUTO: 0.04 THOUSAND/UL (ref 0–0.2)
IMM GRANULOCYTES NFR BLD AUTO: 0 % (ref 0–2)
KETONES UR STRIP-MCNC: ABNORMAL MG/DL
LACTATE SERPL-SCNC: 1.9 MMOL/L (ref 0.5–2)
LEUKOCYTE ESTERASE UR QL STRIP: NEGATIVE
LG PLATELETS BLD QL SMEAR: PRESENT
LIPASE SERPL-CCNC: 77 U/L (ref 11–82)
LYMPHOCYTES # BLD AUTO: 1.49 THOUSANDS/ÂΜL (ref 0.6–4.47)
LYMPHOCYTES NFR BLD AUTO: 17 % (ref 14–44)
MACROCYTES BLD QL AUTO: PRESENT
MAGNESIUM SERPL-MCNC: 1.4 MG/DL (ref 1.9–2.7)
MCH RBC QN AUTO: 34 PG (ref 26.8–34.3)
MCHC RBC AUTO-ENTMCNC: 35.2 G/DL (ref 31.4–37.4)
MCV RBC AUTO: 97 FL (ref 82–98)
MONOCYTES # BLD AUTO: 0.91 THOUSAND/ÂΜL (ref 0.17–1.22)
MONOCYTES NFR BLD AUTO: 10 % (ref 4–12)
NEUTROPHILS # BLD AUTO: 6.27 THOUSANDS/ÂΜL (ref 1.85–7.62)
NEUTS SEG NFR BLD AUTO: 70 % (ref 43–75)
NITRITE UR QL STRIP: NEGATIVE
NRBC BLD AUTO-RTO: 0 /100 WBCS
PH UR STRIP.AUTO: 5.5 [PH]
PLATELET # BLD AUTO: 99 THOUSANDS/UL (ref 149–390)
PLATELET BLD QL SMEAR: ABNORMAL
PMV BLD AUTO: 11 FL (ref 8.9–12.7)
POTASSIUM SERPL-SCNC: 5 MMOL/L (ref 3.5–5.3)
PROT SERPL-MCNC: 7.2 G/DL (ref 6.4–8.4)
PROT UR STRIP-MCNC: NEGATIVE MG/DL
RBC # BLD AUTO: 3.77 MILLION/UL (ref 3.88–5.62)
RBC MORPH BLD: PRESENT
RSV RNA RESP QL NAA+PROBE: NEGATIVE
SARS-COV-2 RNA RESP QL NAA+PROBE: NEGATIVE
SODIUM SERPL-SCNC: 126 MMOL/L (ref 135–147)
SP GR UR STRIP.AUTO: <=1.005 (ref 1–1.03)
UROBILINOGEN UR QL STRIP.AUTO: 0.2 E.U./DL
WBC # BLD AUTO: 9.03 THOUSAND/UL (ref 4.31–10.16)

## 2023-10-29 PROCEDURE — 87086 URINE CULTURE/COLONY COUNT: CPT | Performed by: EMERGENCY MEDICINE

## 2023-10-29 PROCEDURE — 83690 ASSAY OF LIPASE: CPT | Performed by: EMERGENCY MEDICINE

## 2023-10-29 PROCEDURE — 82948 REAGENT STRIP/BLOOD GLUCOSE: CPT

## 2023-10-29 PROCEDURE — C9113 INJ PANTOPRAZOLE SODIUM, VIA: HCPCS | Performed by: EMERGENCY MEDICINE

## 2023-10-29 PROCEDURE — 81003 URINALYSIS AUTO W/O SCOPE: CPT | Performed by: EMERGENCY MEDICINE

## 2023-10-29 PROCEDURE — 99236 HOSP IP/OBS SAME DATE HI 85: CPT | Performed by: INTERNAL MEDICINE

## 2023-10-29 PROCEDURE — 84484 ASSAY OF TROPONIN QUANT: CPT | Performed by: EMERGENCY MEDICINE

## 2023-10-29 PROCEDURE — 96374 THER/PROPH/DIAG INJ IV PUSH: CPT

## 2023-10-29 PROCEDURE — 96361 HYDRATE IV INFUSION ADD-ON: CPT

## 2023-10-29 PROCEDURE — 83605 ASSAY OF LACTIC ACID: CPT | Performed by: EMERGENCY MEDICINE

## 2023-10-29 PROCEDURE — 36415 COLL VENOUS BLD VENIPUNCTURE: CPT | Performed by: EMERGENCY MEDICINE

## 2023-10-29 PROCEDURE — 83735 ASSAY OF MAGNESIUM: CPT | Performed by: EMERGENCY MEDICINE

## 2023-10-29 PROCEDURE — 80053 COMPREHEN METABOLIC PANEL: CPT | Performed by: EMERGENCY MEDICINE

## 2023-10-29 PROCEDURE — 99285 EMERGENCY DEPT VISIT HI MDM: CPT | Performed by: EMERGENCY MEDICINE

## 2023-10-29 PROCEDURE — 85025 COMPLETE CBC W/AUTO DIFF WBC: CPT | Performed by: EMERGENCY MEDICINE

## 2023-10-29 PROCEDURE — 82077 ASSAY SPEC XCP UR&BREATH IA: CPT | Performed by: EMERGENCY MEDICINE

## 2023-10-29 PROCEDURE — 99284 EMERGENCY DEPT VISIT MOD MDM: CPT

## 2023-10-29 PROCEDURE — 87040 BLOOD CULTURE FOR BACTERIA: CPT | Performed by: EMERGENCY MEDICINE

## 2023-10-29 PROCEDURE — 93005 ELECTROCARDIOGRAM TRACING: CPT

## 2023-10-29 PROCEDURE — 0241U HB NFCT DS VIR RESP RNA 4 TRGT: CPT | Performed by: EMERGENCY MEDICINE

## 2023-10-29 RX ORDER — LANOLIN ALCOHOL/MO/W.PET/CERES
400 CREAM (GRAM) TOPICAL 2 TIMES DAILY
Status: DISCONTINUED | OUTPATIENT
Start: 2023-10-30 | End: 2023-10-30

## 2023-10-29 RX ORDER — LANOLIN ALCOHOL/MO/W.PET/CERES
100 CREAM (GRAM) TOPICAL DAILY
Status: DISCONTINUED | OUTPATIENT
Start: 2023-10-30 | End: 2023-10-30 | Stop reason: HOSPADM

## 2023-10-29 RX ORDER — TAMSULOSIN HYDROCHLORIDE 0.4 MG/1
0.4 CAPSULE ORAL DAILY
Status: DISCONTINUED | OUTPATIENT
Start: 2023-10-30 | End: 2023-10-30 | Stop reason: HOSPADM

## 2023-10-29 RX ORDER — PANTOPRAZOLE SODIUM 40 MG/1
40 TABLET, DELAYED RELEASE ORAL DAILY
Status: DISCONTINUED | OUTPATIENT
Start: 2023-10-30 | End: 2023-10-29

## 2023-10-29 RX ORDER — FERROUS SULFATE 325(65) MG
325 TABLET ORAL
Status: DISCONTINUED | OUTPATIENT
Start: 2023-10-30 | End: 2023-10-30 | Stop reason: HOSPADM

## 2023-10-29 RX ORDER — POLYETHYLENE GLYCOL 3350 17 G/17G
17 POWDER, FOR SOLUTION ORAL DAILY PRN
Status: DISCONTINUED | OUTPATIENT
Start: 2023-10-29 | End: 2023-10-30 | Stop reason: HOSPADM

## 2023-10-29 RX ORDER — PANTOPRAZOLE SODIUM 40 MG/10ML
40 INJECTION, POWDER, LYOPHILIZED, FOR SOLUTION INTRAVENOUS EVERY 12 HOURS SCHEDULED
Status: DISCONTINUED | OUTPATIENT
Start: 2023-10-29 | End: 2023-10-30 | Stop reason: HOSPADM

## 2023-10-29 RX ORDER — POTASSIUM CHLORIDE 20 MEQ/1
60 TABLET, EXTENDED RELEASE ORAL ONCE
Status: DISCONTINUED | OUTPATIENT
Start: 2023-10-29 | End: 2023-10-29

## 2023-10-29 RX ORDER — PANTOPRAZOLE SODIUM 40 MG/10ML
40 INJECTION, POWDER, LYOPHILIZED, FOR SOLUTION INTRAVENOUS ONCE
Status: COMPLETED | OUTPATIENT
Start: 2023-10-29 | End: 2023-10-29

## 2023-10-29 RX ORDER — ACETAMINOPHEN 325 MG/1
650 TABLET ORAL EVERY 6 HOURS PRN
Status: DISCONTINUED | OUTPATIENT
Start: 2023-10-29 | End: 2023-10-30 | Stop reason: HOSPADM

## 2023-10-29 RX ORDER — NALTREXONE HYDROCHLORIDE 50 MG/1
50 TABLET, FILM COATED ORAL DAILY
Status: CANCELLED | OUTPATIENT
Start: 2023-10-30

## 2023-10-29 RX ORDER — FOLIC ACID 1 MG/1
1000 TABLET ORAL DAILY
Status: DISCONTINUED | OUTPATIENT
Start: 2023-10-30 | End: 2023-10-30 | Stop reason: HOSPADM

## 2023-10-29 RX ORDER — MAGNESIUM SULFATE HEPTAHYDRATE 40 MG/ML
2 INJECTION, SOLUTION INTRAVENOUS ONCE
Status: COMPLETED | OUTPATIENT
Start: 2023-10-29 | End: 2023-10-29

## 2023-10-29 RX ORDER — PRAVASTATIN SODIUM 40 MG
40 TABLET ORAL
Status: DISCONTINUED | OUTPATIENT
Start: 2023-10-30 | End: 2023-10-30 | Stop reason: HOSPADM

## 2023-10-29 RX ORDER — SODIUM CHLORIDE 9 MG/ML
75 INJECTION, SOLUTION INTRAVENOUS CONTINUOUS
Status: DISCONTINUED | OUTPATIENT
Start: 2023-10-29 | End: 2023-10-30 | Stop reason: HOSPADM

## 2023-10-29 RX ORDER — NICOTINE 21 MG/24HR
1 PATCH, TRANSDERMAL 24 HOURS TRANSDERMAL EVERY 24 HOURS
Status: DISCONTINUED | OUTPATIENT
Start: 2023-10-29 | End: 2023-10-30 | Stop reason: HOSPADM

## 2023-10-29 RX ORDER — ALBUTEROL SULFATE 2.5 MG/3ML
2.5 SOLUTION RESPIRATORY (INHALATION) EVERY 6 HOURS PRN
Status: DISCONTINUED | OUTPATIENT
Start: 2023-10-29 | End: 2023-10-30 | Stop reason: HOSPADM

## 2023-10-29 RX ORDER — INSULIN LISPRO 100 [IU]/ML
1-6 INJECTION, SOLUTION INTRAVENOUS; SUBCUTANEOUS
Status: DISCONTINUED | OUTPATIENT
Start: 2023-10-29 | End: 2023-10-30 | Stop reason: HOSPADM

## 2023-10-29 RX ORDER — FLUTICASONE FUROATE AND VILANTEROL 200; 25 UG/1; UG/1
1 POWDER RESPIRATORY (INHALATION) DAILY
Status: DISCONTINUED | OUTPATIENT
Start: 2023-10-30 | End: 2023-10-30 | Stop reason: HOSPADM

## 2023-10-29 RX ADMIN — SODIUM CHLORIDE 1000 ML: 0.9 INJECTION, SOLUTION INTRAVENOUS at 21:00

## 2023-10-29 RX ADMIN — PANTOPRAZOLE SODIUM 40 MG: 40 INJECTION, POWDER, FOR SOLUTION INTRAVENOUS at 22:17

## 2023-10-29 RX ADMIN — MAGNESIUM SULFATE HEPTAHYDRATE 2 G: 40 INJECTION, SOLUTION INTRAVENOUS at 22:06

## 2023-10-30 VITALS
BODY MASS INDEX: 26.18 KG/M2 | HEIGHT: 74 IN | WEIGHT: 204 LBS | DIASTOLIC BLOOD PRESSURE: 82 MMHG | TEMPERATURE: 97.4 F | RESPIRATION RATE: 21 BRPM | HEART RATE: 73 BPM | OXYGEN SATURATION: 96 % | SYSTOLIC BLOOD PRESSURE: 134 MMHG

## 2023-10-30 PROBLEM — E87.20 METABOLIC ACIDOSIS: Status: RESOLVED | Noted: 2021-04-21 | Resolved: 2023-10-30

## 2023-10-30 PROBLEM — E87.1 HYPONATREMIA: Status: RESOLVED | Noted: 2019-10-17 | Resolved: 2023-10-30

## 2023-10-30 PROBLEM — E83.42 HYPOMAGNESEMIA: Status: RESOLVED | Noted: 2018-10-10 | Resolved: 2023-10-30

## 2023-10-30 LAB
4HR DELTA HS TROPONIN: -3 NG/L
ALBUMIN SERPL BCP-MCNC: 3.5 G/DL (ref 3.5–5)
ALP SERPL-CCNC: 53 U/L (ref 34–104)
ALT SERPL W P-5'-P-CCNC: 31 U/L (ref 7–52)
ANION GAP SERPL CALCULATED.3IONS-SCNC: 10 MMOL/L
AST SERPL W P-5'-P-CCNC: 32 U/L (ref 13–39)
BASOPHILS # BLD AUTO: 0.04 THOUSANDS/ÂΜL (ref 0–0.1)
BASOPHILS NFR BLD AUTO: 1 % (ref 0–1)
BILIRUB SERPL-MCNC: 0.79 MG/DL (ref 0.2–1)
BUN SERPL-MCNC: 35 MG/DL (ref 5–25)
CALCIUM SERPL-MCNC: 8.1 MG/DL (ref 8.4–10.2)
CARDIAC TROPONIN I PNL SERPL HS: 35 NG/L
CHLORIDE SERPL-SCNC: 98 MMOL/L (ref 96–108)
CO2 SERPL-SCNC: 23 MMOL/L (ref 21–32)
CREAT SERPL-MCNC: 1.29 MG/DL (ref 0.6–1.3)
EOSINOPHIL # BLD AUTO: 0.29 THOUSAND/ÂΜL (ref 0–0.61)
EOSINOPHIL NFR BLD AUTO: 4 % (ref 0–6)
ERYTHROCYTE [DISTWIDTH] IN BLOOD BY AUTOMATED COUNT: 13.2 % (ref 11.6–15.1)
GFR SERPL CREATININE-BSD FRML MDRD: 59 ML/MIN/1.73SQ M
GLUCOSE SERPL-MCNC: 112 MG/DL (ref 65–140)
GLUCOSE SERPL-MCNC: 78 MG/DL (ref 65–140)
GLUCOSE SERPL-MCNC: 89 MG/DL (ref 65–140)
HCT VFR BLD AUTO: 33.9 % (ref 36.5–49.3)
HGB BLD-MCNC: 11.9 G/DL (ref 12–17)
IMM GRANULOCYTES # BLD AUTO: 0.04 THOUSAND/UL (ref 0–0.2)
IMM GRANULOCYTES NFR BLD AUTO: 1 % (ref 0–2)
LYMPHOCYTES # BLD AUTO: 1.47 THOUSANDS/ÂΜL (ref 0.6–4.47)
LYMPHOCYTES NFR BLD AUTO: 22 % (ref 14–44)
MAGNESIUM SERPL-MCNC: 1.7 MG/DL (ref 1.9–2.7)
MCH RBC QN AUTO: 34 PG (ref 26.8–34.3)
MCHC RBC AUTO-ENTMCNC: 35.1 G/DL (ref 31.4–37.4)
MCV RBC AUTO: 97 FL (ref 82–98)
MONOCYTES # BLD AUTO: 0.81 THOUSAND/ÂΜL (ref 0.17–1.22)
MONOCYTES NFR BLD AUTO: 12 % (ref 4–12)
NEUTROPHILS # BLD AUTO: 3.9 THOUSANDS/ÂΜL (ref 1.85–7.62)
NEUTS SEG NFR BLD AUTO: 60 % (ref 43–75)
NRBC BLD AUTO-RTO: 0 /100 WBCS
PHOSPHATE SERPL-MCNC: 2.3 MG/DL (ref 2.3–4.1)
PLATELET # BLD AUTO: 80 THOUSANDS/UL (ref 149–390)
PMV BLD AUTO: 10.7 FL (ref 8.9–12.7)
POTASSIUM SERPL-SCNC: 3.6 MMOL/L (ref 3.5–5.3)
PROT SERPL-MCNC: 6.1 G/DL (ref 6.4–8.4)
RBC # BLD AUTO: 3.5 MILLION/UL (ref 3.88–5.62)
SODIUM SERPL-SCNC: 131 MMOL/L (ref 135–147)
WBC # BLD AUTO: 6.55 THOUSAND/UL (ref 4.31–10.16)

## 2023-10-30 PROCEDURE — 80053 COMPREHEN METABOLIC PANEL: CPT

## 2023-10-30 PROCEDURE — 84100 ASSAY OF PHOSPHORUS: CPT

## 2023-10-30 PROCEDURE — 84484 ASSAY OF TROPONIN QUANT: CPT | Performed by: EMERGENCY MEDICINE

## 2023-10-30 PROCEDURE — C9113 INJ PANTOPRAZOLE SODIUM, VIA: HCPCS

## 2023-10-30 PROCEDURE — 85025 COMPLETE CBC W/AUTO DIFF WBC: CPT

## 2023-10-30 PROCEDURE — 82948 REAGENT STRIP/BLOOD GLUCOSE: CPT

## 2023-10-30 PROCEDURE — 83735 ASSAY OF MAGNESIUM: CPT

## 2023-10-30 RX ORDER — MAGNESIUM SULFATE HEPTAHYDRATE 40 MG/ML
2 INJECTION, SOLUTION INTRAVENOUS ONCE
Status: COMPLETED | OUTPATIENT
Start: 2023-10-30 | End: 2023-10-30

## 2023-10-30 RX ADMIN — SODIUM CHLORIDE 75 ML/HR: 0.9 INJECTION, SOLUTION INTRAVENOUS at 00:05

## 2023-10-30 RX ADMIN — THIAMINE HCL TAB 100 MG 100 MG: 100 TAB at 08:47

## 2023-10-30 RX ADMIN — FOLIC ACID 1000 MCG: 1 TABLET ORAL at 08:47

## 2023-10-30 RX ADMIN — MAGNESIUM SULFATE HEPTAHYDRATE 2 G: 40 INJECTION, SOLUTION INTRAVENOUS at 08:47

## 2023-10-30 RX ADMIN — TAMSULOSIN HYDROCHLORIDE 0.4 MG: 0.4 CAPSULE ORAL at 08:47

## 2023-10-30 RX ADMIN — PANTOPRAZOLE SODIUM 40 MG: 40 INJECTION, POWDER, FOR SOLUTION INTRAVENOUS at 08:48

## 2023-10-30 RX ADMIN — FERROUS SULFATE TAB 325 MG (65 MG ELEMENTAL FE) 325 MG: 325 (65 FE) TAB at 08:47

## 2023-10-30 RX ADMIN — METOPROLOL TARTRATE 25 MG: 25 TABLET, FILM COATED ORAL at 08:47

## 2023-10-30 RX ADMIN — FLUTICASONE FUROATE AND VILANTEROL TRIFENATATE 1 PUFF: 200; 25 POWDER RESPIRATORY (INHALATION) at 08:48

## 2023-10-30 RX ADMIN — METOPROLOL TARTRATE 25 MG: 25 TABLET, FILM COATED ORAL at 00:13

## 2023-10-30 NOTE — ASSESSMENT & PLAN NOTE
Wt Readings from Last 3 Encounters:   09/28/23 98.7 kg (217 lb 9.6 oz)   09/22/23 86.6 kg (191 lb)   06/13/23 86.9 kg (191 lb 8 oz)     Last echo on 9/22/23 showed EF 95%, normal systolic function, Q8ZM (pseudonormal relaxation)  Home meds: Lopressor 25 mg every 12 hrs, Ranexa 500 mg every 12 hrs    Given 2L NSS in ED    Plan:  - Hold Ranexa given elevated creatinine   - Continue Lopressor  -Strict I&O's  -Gentle fluid hydration and monitor for signs of fluid overload  -Daily weights  -

## 2023-10-30 NOTE — ASSESSMENT & PLAN NOTE
Bicarb 20, anion gap 15, BUN 46, Cr 2.11, Na 126, K 5.0, mag 1.4. UA revealed trace ketones. Suspect alcoholic ketoacidosis vs starvation ketacidosis. The patient reports poor PO intake and most recent alcoholic beverage 10 days ago.      Received 2L NSS bolus in ED    Plan:   -Continue IV fluid hydration 75 cc/hr  -Recheck AM labs   -Patient currently NPO

## 2023-10-30 NOTE — PLAN OF CARE
Problem: PAIN - ADULT  Goal: Verbalizes/displays adequate comfort level or baseline comfort level  Description: Interventions:  - Encourage patient to monitor pain and request assistance  - Assess pain using appropriate pain scale  - Administer analgesics based on type and severity of pain and evaluate response  - Implement non-pharmacological measures as appropriate and evaluate response  - Consider cultural and social influences on pain and pain management  - Notify physician/advanced practitioner if interventions unsuccessful or patient reports new pain  Outcome: Progressing     Problem: INFECTION - ADULT  Goal: Absence or prevention of progression during hospitalization  Description: INTERVENTIONS:  - Assess and monitor for signs and symptoms of infection  - Monitor lab/diagnostic results  - Monitor all insertion sites, i.e. indwelling lines, tubes, and drains  - Monitor endotracheal if appropriate and nasal secretions for changes in amount and color  - [place] appropriate cooling/warming therapies per order  - Administer medications as ordered  - Instruct and encourage patient and family to use good hand hygiene technique  - Identify and instruct in appropriate isolation precautions for identified infection/condition  Outcome: Progressing     Problem: Knowledge Deficit  Goal: Patient/family/caregiver demonstrates understanding of disease process, treatment plan, medications, and discharge instructions  Description: Complete learning assessment and assess knowledge base.   Interventions:  - Provide teaching at level of understanding  - Provide teaching via preferred learning methods  Outcome: Progressing

## 2023-10-30 NOTE — ASSESSMENT & PLAN NOTE
Acute on chronic, likely in setting of chronic alcohol use and poor PO intake. Na 126 on admission. Pt reported feeling weak and lightheaded x 1 week. Received 2L IV fluid bolus NS in the ED.      Plan:  -Continue IV normal saline 75 cc/hr  -Recheck AM BMP  -Consider urine/serum osm, urine Na if non-responsive to fluid resuscitation

## 2023-10-30 NOTE — DISCHARGE SUMMARY
Discharge Summary - [address] [place] [address] Family Medicine Residency     Patient Information: Manny Pike 66 y.o. male MRN: [id number]  Unit/Bed#: [address] [place] [address] Encounter: [id number]     Admitting Physician: Masood Pearson MD  Discharging Physician/Practitioner: No att. providers found   PCP: Redd Sierra MD  Admission Date:   Admission Orders (From admission, onward)       Ordered        10/29/23 2207  INPATIENT ADMISSION  Once                          Discharge Date: 10/30/23      Reason for Admission: Melena [K92.1]  Hypomagnesemia [E83.42]  Dizziness [R42]  Hyponatremia [E87.1]  Renal failure [N19]  Near syncope [R55]     Discharge Diagnoses:      Principal Problem:    Melena  Active Problems:    COPD (chronic obstructive pulmonary disease) ([facility])    Type 2 diabetes mellitus ([address])    Essential hypertension    CAD (coronary artery disease)    Thrombocytopenia ([address])    Alcohol abuse    Chronic heart failure with preserved ejection fraction ([address])    History of Atrial fibrillation ([address])    Stage 3a chronic kidney disease ([address])  Resolved Problems:    Hypomagnesemia    Hyponatremia    Metabolic acidosis       Consultations During Hospital Stay:  ·       - GI     Procedures Performed:   ·       - None     Significant Findings / Test Results:   ·      -Labs and Vitals grossly WNL     Incidental Findings:   ·       - None      Test Results Pending at Discharge (will require follow up):   ·       N/A left AMA     Outpatient Tests Requested:  ·       - EGD          - Colonoscopy     Outpatient follow-up Requested:  ·       GI    Complications:  N/A    Things to address at first visit after hospitalization   - Is patient still feeling week?  - Has patient continued to have Hypotension?  - Did patient f/u with GI    Hospital Course:      Manny Pike is a 66 y.o. male patient who originally presented to the hospital on 10/29/2023 due to concern GI bleed.  Patient did not wish to remain NPO for EGD and left AMA. Condition at Discharge: good      Discharge Day Visit / Exam:      Vitals: Blood Pressure: 134/82 (10/30/23 0730)  Pulse: 73 (10/30/23 0930)  Temperature: (!) 97.4 °F (36.3 °C) (10/30/23 0730)  Temp Source: Axillary (10/30/23 0730)  Respirations: 21 (10/30/23 0730)  Height: 6' 2" (188 cm) (10/30/23 0048)  Weight - Scale: 92.5 kg (204 lb) (10/30/23 0600)  SpO2: 96 % (10/30/23 0730)  Exam:   Physical Exam  Constitutional:       General: He is not in acute distress. Appearance: Normal appearance. He is not toxic-appearing. HENT:      Head: Normocephalic and atraumatic. Nose: Nose normal.   Eyes:      Pupils: Pupils are equal, round, and reactive to light. Cardiovascular:      Rate and Rhythm: Normal rate and regular rhythm. Pulmonary:      Effort: Pulmonary effort is normal.   Abdominal:      Palpations: Abdomen is soft. Tenderness: There is no abdominal tenderness. Musculoskeletal:         General: No deformity. Skin:     General: Skin is warm and dry. Neurological:      Mental Status: He is alert and oriented to person, place, and time. Psychiatric:         Mood and Affect: Affect is angry. Behavior: Behavior is agitated. Discharge instructions/Information to patient and family:   See after visit summary for information provided to patient and family. Discharge Medications:     Medication List      CONTINUE taking these medications     * albuterol 90 mcg/act inhaler; Commonly known as: Ventolin HFA; Inhale   2 puffs every 4 (four) hours as needed for wheezing   * albuterol (2.5 mg/3 mL) 0.083 % nebulizer solution;  Take 3 mL (2.5 mg   total) by nebulization every 6 (six) hours as needed for wheezing or   shortness of breath   apixaban 5 mg; Commonly known as: ELIQUIS; Take 1 tablet (5 mg total) by   mouth 2 (two) times a day   aspirin 81 mg EC tablet; Commonly known as: ECOTRIN LOW STRENGTH   Breo Ellipta 200-25 mcg/actuation inhaler; Generic drug: fluticasone-vilanterol; Inhale 1 puff daily Rinse mouth after use. ferrous sulfate 325 (65 Fe) mg tablet; Take 1 tablet (325 mg total) by   mouth daily with breakfast   folic acid 1 mg tablet; Commonly known as: FOLVITE; TAKE 1 TABLET DAILY   gabapentin 300 mg capsule; Commonly known as: NEURONTIN; TAKE ONE   CAPSULE THREE TIMES DAILY   lisinopril 20 mg tablet; Commonly known as: ZESTRIL; Take 0.5 tablets   (10 mg total) by mouth daily   magnesium Oxide 400 mg Tabs; Commonly known as: MAG-OX; TAKE 1 TABLET   TWO TIMES A DAY   metFORMIN 500 mg tablet; Commonly known as: GLUCOPHAGE; Take 1 tablet   (500 mg total) by mouth daily with breakfast Do not start before September 23, 2023. metoprolol tartrate 25 mg tablet; Commonly known as: LOPRESSOR; Take 1   tablet (25 mg total) by mouth every 12 (twelve) hours   naltrexone 50 mg tablet; Commonly known as: REVIA; Take 1 tablet (50 mg   total) by mouth daily Do not start before May 14, 2023. nicotine 21 mg/24 hr TD 24 hr patch; Commonly known as: [place] Fuchs;   Place 1 patch on the skin over 24 hours every 24 hours   ONE TOUCH ULTRA MINI w/Device Kit   OneTouch Delica Lancets Fine Misc   pantoprazole 40 mg tablet; Commonly known as: PROTONIX; Take 1 tablet   (40 mg total) by mouth daily   pravastatin 40 mg tablet; Commonly known as: PRAVACHOL; TAKE 1 TABLET   DAILY WITH DINNER   ranolazine 500 mg 12 hr tablet; Commonly known as: RANEXA; TAKE 1 TABLET   EVERY 12 HOURS   tamsulosin 0.4 mg; Commonly known as: FLOMAX; TAKE 1 CAPSULE DAILY   varenicline 1 mg tablet; Commonly known as: CHANTIX; TAKE 1 TABLET 2   TIMES A DAY   vitamin B-1 100 MG Tabs; TAKE 1 TABLET DAILY  * This list has 2 medication(s) that are the same as other medications   prescribed for you. Read the directions carefully, and ask your doctor or   other care provider to review them with you.         Disposition:   Home Against Medical Advice        Discharge Statement:  I spent >30 minutes discharging the patient. This time was spent on the day of discharge. I had direct contact with the patient on the day of discharge. Greater than 50% of the total time was spent examining patient, answering all patient questions, arranging and discussing plan of care with patient as well as directly providing post-discharge instructions. Additional time then spent on discharge activities.      ** Please Note: This note has been constructed using a voice recognition system Kash Yoo DO   10/30/23  5:52 PM  \

## 2023-10-30 NOTE — ASSESSMENT & PLAN NOTE
Lab Results   Component Value Date    EGFR 32 10/29/2023    EGFR 62 10/13/2023    EGFR 64 09/22/2023    CREATININE 2.11 (H) 10/29/2023    CREATININE 1.23 10/13/2023    CREATININE 1.20 09/22/2023     Elevated Cr on CKD. Not meeting criteria for ENMA, however significantly elevated Cr 2.11 from baseline 1.1-1.3.     Plan:  - Strict I/O's  - Avoid nephrotoxins  - Renally dose medications   -START IV fluids 75 cc/hr

## 2023-10-30 NOTE — ASSESSMENT & PLAN NOTE
Lab Results   Component Value Date    HGBA1C 6.1 (H) 09/20/2023     Chronic, stable. Home medications include metformin.      Plan:  -Hold Metformin  -Patient is currently NPO  -START insulin sliding scale

## 2023-10-30 NOTE — PLAN OF CARE
Problem: Potential for Falls  Goal: Patient will remain free of falls  Description: INTERVENTIONS:  - Educate patient/family on patient safety including physical limitations  - Instruct patient to call for assistance with activity   - Consult OT/PT to assist with strengthening/mobility   - Keep Call bell within reach  - Keep bed low and locked with side rails adjusted as appropriate  - Keep care items and personal belongings within reach  - Initiate and maintain comfort rounds  - Make Fall Risk Sign visible to staff  - Apply yellow socks and bracelet for high fall risk patients  - Consider moving patient to room near nurses station  Outcome: Progressing     Problem: PAIN - ADULT  Goal: Verbalizes/displays adequate comfort level or baseline comfort level  Description: Interventions:  - Encourage patient to monitor pain and request assistance  - Assess pain using appropriate pain scale  - Administer analgesics based on type and severity of pain and evaluate response  - Implement non-pharmacological measures as appropriate and evaluate response  - Consider cultural and social influences on pain and pain management  - Notify physician/advanced practitioner if interventions unsuccessful or patient reports new pain  Outcome: Progressing     Problem: INFECTION - ADULT  Goal: Absence or prevention of progression during hospitalization  Description: INTERVENTIONS:  - Assess and monitor for signs and symptoms of infection  - Monitor lab/diagnostic results  - Monitor all insertion sites, i.e. indwelling lines, tubes, and drains  - Monitor endotracheal if appropriate and nasal secretions for changes in amount and color  - [place] appropriate cooling/warming therapies per order  - Administer medications as ordered  - Instruct and encourage patient and family to use good hand hygiene technique  - Identify and instruct in appropriate isolation precautions for identified infection/condition  Outcome: Progressing     Problem: SAFETY ADULT  Goal: Patient will remain free of falls  Description: INTERVENTIONS:  - Educate patient/family on patient safety including physical limitations  - Instruct patient to call for assistance with activity   - Consult OT/PT to assist with strengthening/mobility   - Keep Call bell within reach  - Keep bed low and locked with side rails adjusted as appropriate  - Keep care items and personal belongings within reach  - Initiate and maintain comfort rounds  - Make Fall Risk Sign visible to staff  - Apply yellow socks and bracelet for high fall risk patients  - Consider moving patient to room near nurses station  Outcome: Progressing     Problem: DISCHARGE PLANNING  Goal: Discharge to home or other facility with appropriate resources  Description: INTERVENTIONS:  - Identify barriers to discharge w/patient and caregiver  - Arrange for needed discharge resources and transportation as appropriate  - Identify discharge learning needs (meds, wound care, etc.)  - Arrange for interpretive services to assist at discharge as needed  - Refer to Case Management Department for coordinating discharge planning if the patient needs post-hospital services based on physician/advanced practitioner order or complex needs related to functional status, cognitive ability, or social support system  Outcome: Progressing     Problem: Knowledge Deficit  Goal: Patient/family/caregiver demonstrates understanding of disease process, treatment plan, medications, and discharge instructions  Description: Complete learning assessment and assess knowledge base.   Interventions:  - Provide teaching at level of understanding  - Provide teaching via preferred learning methods  Outcome: Progressing     Problem: CARDIOVASCULAR - ADULT  Goal: Maintains optimal cardiac output and hemodynamic stability  Description: INTERVENTIONS:  - Monitor I/O, vital signs and rhythm  - Monitor for S/S and trends of decreased cardiac output  - Administer and titrate ordered vasoactive medications to optimize hemodynamic stability  - Assess quality of pulses, skin color and temperature  - Assess for signs of decreased coronary artery perfusion  - Instruct patient to report change in severity of symptoms  Outcome: Progressing     Problem: RESPIRATORY - ADULT  Goal: Achieves optimal ventilation and oxygenation  Description: INTERVENTIONS:  - Assess for changes in respiratory status  - Assess for changes in mentation and behavior  - Position to facilitate oxygenation and minimize respiratory effort  - Oxygen administered by appropriate delivery if ordered  - Initiate smoking cessation education as indicated  - Encourage broncho-pulmonary hygiene including cough, deep breathe, Incentive Spirometry  - Assess the need for suctioning and aspirate as needed  - Assess and instruct to report SOB or any respiratory difficulty  - Respiratory Therapy support as indicated  Outcome: Progressing     Problem: GASTROINTESTINAL - ADULT  Goal: Minimal or absence of nausea and/or vomiting  Description: INTERVENTIONS:  - Administer IV fluids if ordered to ensure adequate hydration  - Maintain NPO status until nausea and vomiting are resolved  - Nasogastric tube if ordered  - Administer ordered antiemetic medications as needed  - Provide nonpharmacologic comfort measures as appropriate  - Advance diet as tolerated, if ordered  - Consider nutrition services referral to assist patient with adequate nutrition and appropriate food choices  Outcome: Progressing  Goal: Maintains or returns to baseline bowel function  Description: INTERVENTIONS:  - Assess bowel function  - Encourage oral fluids to ensure adequate hydration  - Administer IV fluids if ordered to ensure adequate hydration  - Administer ordered medications as needed  - Encourage mobilization and activity  - Consider nutritional services referral to assist patient with adequate nutrition and appropriate food choices  Outcome: Progressing Problem: METABOLIC, FLUID AND ELECTROLYTES - ADULT  Goal: Glucose maintained within target range  Description: INTERVENTIONS:  - Monitor Blood Glucose as ordered  - Assess for signs and symptoms of hyperglycemia and hypoglycemia  - Administer ordered medications to maintain glucose within target range  - Assess nutritional intake and initiate nutrition service referral as needed  Outcome: Progressing

## 2023-10-30 NOTE — ASSESSMENT & PLAN NOTE
Mg 1.4 on admission. Patient on chronic mag-ox 400 mg BID.      Plan:  -Continue home medications  - Replete as needed

## 2023-10-30 NOTE — ED PROVIDER NOTES
History  Chief Complaint   Patient presents with    Dizziness     Not feeling well for a week, lightheaded, runny nose and weakness     72-year-old male presents with feeling lightheaded generalized weakness fatigue for the past week not feeling well. Admits to some dark stools denies any nausea vomiting abdominal pain diarrhea constipation fevers chills dysuria urgency frequency or any other symptoms does use alcohol. History provided by:  Patient   used: No        Prior to Admission Medications   Prescriptions Last Dose Informant Patient Reported? Taking? Blood Glucose Monitoring Suppl (ONE TOUCH ULTRA MINI) w/Device KIT  Self Yes No   Sig: Use as directed   Breo Ellipta 200-25 MCG/ACT inhaler   No No   Sig: Inhale 1 puff daily Rinse mouth after use.    ONETOUCH DELICA LANCETS FINE MISC  Self Yes No   Sig: 3 (three) times a day Test   Thiamine HCl (vitamin B-1) 100 MG TABS  Self No No   Sig: TAKE 1 TABLET DAILY   albuterol (2.5 mg/3 mL) 0.083 % nebulizer solution   No No   Sig: Take 3 mL (2.5 mg total) by nebulization every 6 (six) hours as needed for wheezing or shortness of breath   albuterol (Ventolin HFA) 90 mcg/act inhaler   No No   Sig: Inhale 2 puffs every 4 (four) hours as needed for wheezing   apixaban (ELIQUIS) 5 mg   No No   Sig: Take 1 tablet (5 mg total) by mouth 2 (two) times a day   aspirin (ECOTRIN LOW STRENGTH) 81 mg EC tablet   Yes No   Sig: Take 81 mg by mouth daily   ferrous sulfate 325 (65 Fe) mg tablet   No No   Sig: Take 1 tablet (325 mg total) by mouth daily with breakfast   folic acid (FOLVITE) 1 mg tablet   No No   Sig: TAKE 1 TABLET DAILY   gabapentin (NEURONTIN) 300 mg capsule   No No   Sig: TAKE ONE CAPSULE THREE TIMES DAILY   lisinopril (ZESTRIL) 20 mg tablet   No No   Sig: Take 0.5 tablets (10 mg total) by mouth daily   magnesium Oxide (MAG-OX) 400 mg TABS   No No   Sig: TAKE 1 TABLET TWO TIMES A DAY   metFORMIN (GLUCOPHAGE) 500 mg tablet   No No   Sig: Take 1 tablet (500 mg total) by mouth daily with breakfast Do not start before September 23, 2023. metoprolol tartrate (LOPRESSOR) 25 mg tablet   No No   Sig: Take 1 tablet (25 mg total) by mouth every 12 (twelve) hours   naltrexone (REVIA) 50 mg tablet   No No   Sig: Take 1 tablet (50 mg total) by mouth daily Do not start before May 14, 2023. nicotine (NICODERM CQ) 21 mg/24 hr TD 24 hr patch   No No   Sig: Place 1 patch on the skin over 24 hours every 24 hours   pantoprazole (PROTONIX) 40 mg tablet   No No   Sig: Take 1 tablet (40 mg total) by mouth daily   pravastatin (PRAVACHOL) 40 mg tablet   No No   Sig: TAKE 1 TABLET DAILY WITH DINNER   ranolazine (RANEXA) 500 mg 12 hr tablet   No No   Sig: TAKE 1 TABLET EVERY 12 HOURS   tamsulosin (FLOMAX) 0.4 mg   No No   Sig: TAKE 1 CAPSULE DAILY   varenicline (CHANTIX) 1 mg tablet   No No   Sig: TAKE 1 TABLET 2 TIMES A DAY      Facility-Administered Medications: None       Past Medical History:   Diagnosis Date    Acute on chronic kidney failure      Alcohol withdrawal (HCC) 06/07/2019    Atrial fibrillation (HCC)     Cancer (HCC)     prostate ca,had radiation    Cardiac disease     stents,then triple bypass    COPD (chronic obstructive pulmonary disease) (HCC)     ETOH abuse     Hx of heart artery stent     2014    Hyperlipidemia     Hypertension     Hypovolemic shock ([address]) 12/22/2019    Lumbar spondylitis ([address]) 10/13/2022    Nasal bone fracture 10/10/2022    Prostate CA ([address])     S/P CABG x 3     2004    Sleep apnea        Past Surgical History:   Procedure Laterality Date    CARDIAC CATHETERIZATION      2 stents    CORONARY ARTERY BYPASS GRAFT  2004    LA ARTHRD ANT INTERBODY MIN [address] CRV BELOW C2 N/A 12/16/2020    Procedure: Anterior cervical discectomy with fusion C4-C7;  Posterior cervical decompression and fusion C2-T2;  Surgeon: Samanta Junior MD;  Location: [facility];  Service: Neurosurgery    TONSILLECTOMY         Family History   Problem Relation Age of Onset Diabetes Mother     Uterine cancer Mother     COPD Father     Hypertension Father      I have reviewed and agree with the history as documented. E-Cigarette/Vaping    E-Cigarette Use Never User      E-Cigarette/Vaping Substances    Nicotine Yes     THC No     CBD No     Flavoring No     Other No     Unknown No      Social History     Tobacco Use    Smoking status: Every Day     Packs/day: 1.50     Years: 40.00     Total pack years: 60.00     Types: Cigarettes    Smokeless tobacco: Never   Vaping Use    Vaping Use: Never used   Substance Use Topics    Alcohol use: Not Currently     Alcohol/week: 4.0 standard drinks of alcohol     Types: 4 Standard drinks or equivalent per week    Drug use: No       Review of Systems   Constitutional:  Positive for fatigue. HENT: Negative. Eyes: Negative. Respiratory: Negative. Cardiovascular: Negative. Gastrointestinal: Negative. Endocrine: Negative. Genitourinary: Negative. Musculoskeletal: Negative. Skin: Negative. Allergic/Immunologic: Negative. Neurological:  Positive for weakness and light-headedness. Hematological: Negative. Psychiatric/Behavioral: Negative. All other systems reviewed and are negative. Physical Exam  Physical Exam  Vitals and nursing note reviewed. Constitutional:       Appearance: Normal appearance. HENT:      Head: Normocephalic and atraumatic. Nose: Nose normal.      Mouth/Throat:      Mouth: Mucous membranes are moist.   Eyes:      Extraocular Movements: Extraocular movements intact. Pupils: Pupils are equal, round, and reactive to light. Cardiovascular:      Rate and Rhythm: Normal rate and regular rhythm. Pulmonary:      Effort: Pulmonary effort is normal.      Breath sounds: Normal breath sounds. Abdominal:      General: Abdomen is flat. Bowel sounds are normal.      Palpations: Abdomen is soft. Musculoskeletal:         General: Normal range of motion.       Cervical back: Normal range of motion and neck supple. Skin:     General: Skin is warm. Capillary Refill: Capillary refill takes less than 2 seconds. Neurological:      General: No focal deficit present. Mental Status: He is alert and oriented to person, place, and time. Mental status is at baseline. Cranial Nerves: No cranial nerve deficit. Sensory: No sensory deficit. Motor: No weakness. Coordination: Coordination normal.      Gait: Gait normal.      Deep Tendon Reflexes: Reflexes normal.   Psychiatric:         Mood and Affect: Mood normal.         Thought Content:  Thought content normal.         Vital Signs  ED Triage Vitals   Temperature Pulse Respirations Blood Pressure SpO2   10/29/23 2023 10/29/23 2023 10/29/23 2023 10/29/23 2030 10/29/23 2023   98 °F (36.7 °C) 89 20 (!) 76/40 96 %      Temp src Heart Rate Source Patient Position - Orthostatic VS BP Location FiO2 (%)   -- 10/29/23 2023 10/29/23 2023 10/29/23 2023 --    Monitor Sitting Left arm       Pain Score       --                  Vitals:    10/29/23 2100 10/29/23 2130 10/29/23 2145 10/29/23 2200   BP: 110/63 117/65 119/68 116/62   Pulse: 80 72 73 71   Patient Position - Orthostatic VS: Lying Lying Lying Lying         Visual Acuity      ED Medications  Medications   magnesium sulfate 2 g/50 mL IVPB (premix) 2 g (2 g Intravenous New Bag 10/29/23 2206)   pantoprazole (PROTONIX) injection 40 mg (has no administration in time range)   sodium chloride 0.9 % bolus 1,000 mL (1,000 mL Intravenous New Bag 10/29/23 2100)   sodium chloride 0.9 % bolus 1,000 mL (0 mL Intravenous Stopped 10/29/23 2206)       Diagnostic Studies  Results Reviewed       Procedure Component Value Units Date/Time    Ethanol [[id number]]  (Normal) Collected: 10/29/23 2048    Lab Status: Final result Specimen: Blood from Arm, Left Updated: 10/29/23 2211     Ethanol Lvl <10 mg/dL     CBC and differential [[id number]]  (Abnormal) Collected: 10/29/23 2048    Lab Status: Final result Specimen: Blood from Arm, Left Updated: 10/29/23 2202     WBC 9.03 Thousand/uL      RBC 3.77 Million/uL      Hemoglobin 12.8 g/dL      Hematocrit 36.4 %      MCV 97 fL      MCH 34.0 pg      MCHC 35.2 g/dL      RDW 13.2 %      MPV 11.0 fL      Platelets 99 Thousands/uL      nRBC 0 /100 WBCs      Neutrophils Relative 70 %      Immat GRANS % 0 %      Lymphocytes Relative 17 %      Monocytes Relative 10 %      Eosinophils Relative 3 %      Basophils Relative 0 %      Neutrophils Absolute 6.27 Thousands/µL      Immature Grans Absolute 0.04 Thousand/uL      Lymphocytes Absolute 1.49 Thousands/µL      Monocytes Absolute 0.91 Thousand/µL      Eosinophils Absolute 0.28 Thousand/µL      Basophils Absolute 0.04 Thousands/µL     Narrative:      No Clots  This is an appended report. These results have been appended to a previously verified report. Smear Review(Phlebs Do Not Order) [[id number]]  (Abnormal) Collected: 10/29/23 2048    Lab Status: Final result Specimen: Blood from Arm, Left Updated: 10/29/23 2202     RBC Morphology Present     Platelet Estimate Decreased     Large Platelet Present     Macrocytes Present    Narrative:      No Clots    FLU/RSV/COVID - if FLU/RSV clinically relevant [[id number]]  (Normal) Collected: 10/29/23 2103    Lab Status: Final result Specimen: Nares from Nose Updated: 10/29/23 2148     SARS-CoV-2 Negative     INFLUENZA A PCR Negative     INFLUENZA B PCR Negative     RSV PCR Negative    Narrative:      FOR PEDIATRIC PATIENTS - copy/paste COVID Guidelines URL to browser: [email or web address]    SARS-CoV-2 assay is a Nucleic Acid Amplification assay intended for the  qualitative detection of nucleic acid from SARS-CoV-2 in nasopharyngeal  swabs. Results are for the presumptive identification of SARS-CoV-2 RNA.     Positive results are indicative of infection with SARS-CoV-2, the virus  causing COVID-19, but do not rule out bacterial infection or co-infection  with other viruses. Laboratories within the [facility] and its  territories are required to report all positive results to the appropriate  public health authorities. Negative results do not preclude SARS-CoV-2  infection and should not be used as the sole basis for treatment or other  patient management decisions. Negative results must be combined with  clinical observations, patient history, and epidemiological information. This test has not been FDA cleared or approved. This test has been authorized by FDA under an Emergency Use Authorization  (EUA). This test is only authorized for the duration of time the  declaration that circumstances exist justifying the authorization of the  emergency use of an in vitro diagnostic tests for detection of SARS-CoV-2  virus and/or diagnosis of COVID-19 infection under section 564(b)(1) of  the Act, 21 U. S.C. 366NFR-8(M)(0), unless the authorization is terminated  or revoked sooner. The test has been validated but independent review by FDA  and CLIA is pending. Test performed using [facility] GeneXpert: This RT-PCR assay targets N2,  a region unique to SARS-CoV-2. A conserved region in the E-gene was chosen  for pan-Sarbecovirus detection which includes SARS-CoV-2. According to CMS-2020-01-R, this platform meets the definition of high-throughput technology.     Comprehensive metabolic panel [[id number]]  (Abnormal) Collected: 10/29/23 2048    Lab Status: Final result Specimen: Blood from Arm, Left Updated: 10/29/23 2122     Sodium 126 mmol/L      Potassium 5.0 mmol/L      Chloride 91 mmol/L      CO2 20 mmol/L      ANION GAP 15 mmol/L      BUN 46 mg/dL      Creatinine 2.11 mg/dL      Glucose 102 mg/dL      Calcium 8.7 mg/dL      AST 51 U/L      ALT 40 U/L      Alkaline Phosphatase 58 U/L      Total Protein 7.2 g/dL      Albumin 4.1 g/dL      Total Bilirubin 0.93 mg/dL      eGFR 32 ml/min/1.73sq m     Narrative:      [facility] guidelines for Chronic Kidney Disease (CKD):     Stage 1 with normal or high GFR (GFR > 90 mL/min/1.73 square meters)    Stage 2 Mild CKD (GFR = 60-89 mL/min/1.73 square meters)    Stage 3A Moderate CKD (GFR = 45-59 mL/min/1.73 square meters)    Stage 3B Moderate CKD (GFR = 30-44 mL/min/1.73 square meters)    Stage 4 Severe CKD (GFR = 15-29 mL/min/1.73 square meters)    Stage 5 End Stage CKD (GFR <15 mL/min/1.73 square meters)  Note: GFR calculation is accurate only with a steady state creatinine    Lipase [[id number]]  (Normal) Collected: 10/29/23 2048    Lab Status: Final result Specimen: Blood from Arm, Left Updated: 10/29/23 2122     Lipase 77 u/L     Magnesium [[id number]]  (Abnormal) Collected: 10/29/23 2048    Lab Status: Final result Specimen: Blood from Arm, Left Updated: 10/29/23 2121     Magnesium 1.4 mg/dL     Lactic acid, plasma (w/reflex if result > 2.0) [[id number]]  (Normal) Collected: 10/29/23 2048    Lab Status: Final result Specimen: Blood from Arm, Left Updated: 10/29/23 2121     LACTIC ACID 1.9 mmol/L     Narrative:      Result may be elevated if tourniquet was used during collection. HS Troponin 0hr (reflex protocol) [[id number]]  (Normal) Collected: 10/29/23 2048    Lab Status: Final result Specimen: Blood from Arm, Left Updated: 10/29/23 2121     hs TnI 0hr 38 ng/L     HS Troponin I 2hr [[id number]]     Lab Status: No result Specimen: Blood     Fingerstick Glucose (POCT) [[id number]]  (Normal) Collected: 10/29/23 2102    Lab Status: Final result Updated: 10/29/23 2104     POC Glucose 115 mg/dl     Blood culture #1 [[id number]] Collected: 10/29/23 2054    Lab Status: In process Specimen: Blood from Hand, Left Updated: 10/29/23 2058    Blood culture #2 [[id number]] Collected: 10/29/23 2048    Lab Status:  In process Specimen: Blood from Arm, Left Updated: 10/29/23 2053    UA (URINE) with reflex to Scope [[id number]]     Lab Status: No result Specimen: Urine     Urine culture [[id number]]     Lab Status: No result Specimen: Urine                    No orders to display              Procedures  ECG 12 Lead Documentation Only    Date/Time: 10/29/2023 10:13 PM    Performed by: Marvin Lo DO  Authorized by: Marvin Lo DO    ECG reviewed by me, the ED Provider: yes    Patient location:  ED  Previous ECG:     Previous ECG:  Unavailable    Comparison to cardiac monitor: Yes    Interpretation:     Interpretation: non-specific    Rate:     ECG rate assessment: normal    Rhythm:     Rhythm: sinus rhythm    Ectopy:     Ectopy: none    QRS:     QRS axis:  Normal  Conduction:     Conduction: normal    ST segments:     ST segments:  Non-specific  T waves:     T waves: non-specific             ED Course                                             Medical Decision Making  Patient evaluated with labs,EKG. Reviewed results discussed with patient. Patient given IV fluids, pain,  Patient improved with symptoms, admitted to hospitalist service for further evaluation and management. Patient verbalized understanding of results and agreed with the plan. Problems Addressed:  Hypomagnesemia: acute illness or injury  Hyponatremia: acute illness or injury  Near syncope: acute illness or injury  Renal failure: acute illness or injury    Amount and/or Complexity of Data Reviewed  External Data Reviewed: notes. Labs: ordered. Decision-making details documented in ED Course. ECG/medicine tests: ordered and independent interpretation performed. Decision-making details documented in ED Course. Risk  Prescription drug management. Decision regarding hospitalization.              Disposition  Final diagnoses:   Near syncope   Hyponatremia   Renal failure   Hypomagnesemia     Time reflects when diagnosis was documented in both MDM as applicable and the Disposition within this note       Time User Action Codes Description Comment    10/29/2023 10:07 PM Myrna Madden [R55] Near syncope     10/29/2023 10:07 PM Myrna Madden Add [E87.1] Hyponatremia     10/29/2023 10:07 PM Trish Madden [N19] Renal failure     10/29/2023 10:07 PM Trish Madden [E83.42] Hypomagnesemia           ED Disposition       ED Disposition   Admit    Condition   Stable    Date/Time   Sun Oct 29, 2023 10:07 PM    Comment                   Follow-up Information    None         Patient's Medications   Discharge Prescriptions    No medications on file       No discharge procedures on file. PDMP Review         Value Time User    PDMP Reviewed  Yes 7/26/2021  4:50 PM Sherly Kilpatrick.  MD Eduardo            ED Provider  Electronically Signed by             Judith Potter DO  10/29/23 [id number]

## 2023-10-30 NOTE — ASSESSMENT & PLAN NOTE
Chronic  Home meds: Aspirin 81 mg, Eliquis 5 mg BID, Pravastatin 40 mg    Plan:  - Hold ASA Eliquis  -Continue pravastatin  - Monitor platelets

## 2023-10-30 NOTE — ASSESSMENT & PLAN NOTE
Chronic. Home meds: Breo Ellipta 200-25 mcg inhaler, Albuterol PRN. Patient admits only intermittent compliance.     Plan:  - Continue home inhalers  -Hold albuterol inhaler   -START albuterol nebulizer  - Monitor O2 sats

## 2023-10-30 NOTE — NURSING NOTE
Pt d/c to home. [facility] set up for patient for transport. Pt transported to [facility] via wheelchair. AVS reviewed with patient, no questions at this time. Pt educated about smoking cessation. IV removed per policy and procedure, occlusive dressing applied. Pt left with all belongings.

## 2023-10-30 NOTE — H&P
History and Physical - [facility] Medicine Residency     Patient Information: Omar Bunch 66 y.o. male MRN: [id number]  Unit/Bed#: [address] [place] [address] Encounter: [id number]  Admitting Physician: Jose Byrd MD   PCP: Elen Barrow MD  Date of Admission:  10/30/23     Assessment and Plan     * Metabolic acidosis  Assessment & Plan  Bicarb 20, anion gap 15, BUN 46, Cr 2.11, Na 126, K 5.0, mag 1.4. UA revealed trace ketones. Suspect alcoholic ketoacidosis vs starvation ketacidosis. The patient reports poor PO intake and most recent alcoholic beverage 10 days ago. Received 2L NSS bolus in ED    Plan:   -Continue IV fluid hydration 75 cc/hr  -Recheck AM labs   -Patient currently NPO    Melena  Assessment & Plan  Patient reporting episodes of melena, most recent 3 days ago. Stool occult positive in the ED. Hemoglobin stable, platelet 99. Patient is on Eliquis and chronic aspirin therapy and has a history of alcohol abuse. BUN 46, Cr 2.11. BUN/Cr ratio 21    Plan:  -Consult to GI  -Hold ASA and Eliquis  -Patient is currently NPO, sips with meds  -Recheck AM CBC  -START fluids 75 cc/hr  -START IV Protonix 40 mg BID    Hyponatremia  Assessment & Plan  Acute on chronic, likely in setting of chronic alcohol use and poor PO intake. Na 126 on admission. Pt reported feeling weak and lightheaded x 1 week. Received 2L IV fluid bolus NS in the ED. Plan:  -Continue IV normal saline 75 cc/hr  -Recheck AM BMP  -Consider urine/serum osm, urine Na if non-responsive to fluid resuscitation     Hypomagnesemia  Assessment & Plan  Mg 1.4 on admission. Patient on chronic mag-ox 400 mg BID.      Plan:  -Continue home medications  - Replete as needed    Stage 3a chronic kidney disease [facility])  Assessment & Plan  Lab Results   Component Value Date    EGFR 32 10/29/2023    EGFR 62 10/13/2023    EGFR 64 09/22/2023    CREATININE 2.11 (H) 10/29/2023    CREATININE 1.23 10/13/2023    CREATININE 1.20 09/22/2023     Elevated Cr on CKD. Not meeting criteria for ENMA, however significantly elevated Cr 2.11 from baseline 1.1-1.3. Plan:  - Strict I/O's  - Avoid nephrotoxins  - Renally dose medications   -START IV fluids 75 cc/hr    History of Atrial fibrillation (HCC)  Assessment & Plan  Chronic, rate controlled  Home meds: Eliquis 5 mg BID, Lopressor 25 mg every 12 hrs, Ranexa 500 mg every 12 hrs  EKG on admission showed NSR at 80 BPM    Plan:  - Hold Ranexa given elevated creatinine  - Hold Eliquis  -Continue Lopressor    Chronic heart failure with preserved ejection fraction (HCC)  Assessment & Plan  Wt Readings from Last 3 Encounters:   09/28/23 98.7 kg (217 lb 9.6 oz)   09/22/23 86.6 kg (191 lb)   06/13/23 86.9 kg (191 lb 8 oz)     Last echo on 9/22/23 showed EF 26%, normal systolic function, P1YC (pseudonormal relaxation)  Home meds: Lopressor 25 mg every 12 hrs, Ranexa 500 mg every 12 hrs    Given 2L NSS in ED    Plan:  - Hold Ranexa given elevated creatinine   - Continue Lopressor  -Strict I&O's  -Gentle fluid hydration and monitor for signs of fluid overload  -Daily weights  -    Alcohol abuse  Assessment & Plan  Chronic. Home meds: Folate, Thiamine, Naltrexone     Last drink reported 10 days ago  Ethanol level <10  AST 51, ALT 40    Plan:   - Continue home Folate, Thiamine  - [facility] protocol  - Hold naltrexone    Thrombocytopenia (HCC)  Assessment & Plan  Chronic, worsening. Plt 99 on admission, baseline approximately . Pt has history of alcohol abuse, on chronic ASA therapy, and reports melena. Plan:   -Patient is currently NPO  -GI consulted  -Pending manual smear     CAD (coronary artery disease)  Assessment & Plan  Chronic  Home meds: Aspirin 81 mg, Eliquis 5 mg BID, Pravastatin 40 mg    Plan:  - Hold ASA Eliquis  -Continue pravastatin  - Monitor platelets    Essential hypertension  Assessment & Plan  Hypotensive on admission    BP improved after IV fluid bolus in the ED.   Home meds: Lisinopril 10 mg, Lopressor 25 mg every 12 hrs    Plan:  - Hold Lisinopril given elevated creatinine  - Continue Lopressor with hold parameters  - Monitor V/S    Type 2 diabetes mellitus ([address])  Assessment & Plan    Lab Results   Component Value Date    HGBA1C 6.1 (H) 09/20/2023     Chronic, stable. Home medications include metformin. Plan:  -Hold Metformin  -Patient is currently NPO  -START insulin sliding scale     COPD (chronic obstructive pulmonary disease) ([facility])  Assessment & Plan  Chronic. Home meds: Breo Ellipta 200-25 mcg inhaler, Albuterol PRN. Patient admits only intermittent compliance. Plan:  - Continue home inhalers  -Hold albuterol inhaler   -START albuterol nebulizer  - Monitor O2 sats         Fluids: NSS 75 cc/hr  Electrolyte repletion: Replete Mg now, and as needed. Nutrition:        Diet Orders   (From admission, onward)                 Start     Ordered    10/29/23 2309  Diet NPO; Sips with meds  Diet effective now        References:    Adult Nutrition Support Algorithm    RD Therapeutic Diet Order Protocol   Question Answer Comment   Diet Type NPO    NPO Except: Sips with meds    RD to adjust diet per protocol? Yes        10/29/23 2308                   VTE Prophylaxis: VTE Score: 5 High Risk (Score >/= 5) - Pharmacological DVT Prophylaxis Contraindicated. Sequential Compression Devices Ordered. Code Status: Level 1 - Full Code   Anticipated Length of Stay:  Patient will be admitted on an Inpatient basis with an anticipated length of stay of more than 2 midnights. Justification for Hospital Stay: metabolic acidosis/melena  Total Time for Visit, including Counseling / Coordination of Care: 35 mins. Greater than 50% of this total time spent on direct patient counseling and coordination of care. Patient Information Sharing: With the consent of Ar Lindquist, their loved ones were notified today by inpatient team of the patient’s condition and current plan. All questions answered.      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 Past medical history includes hx of alcohol abuse, CAD, thrombocytopenia, PAD, CKD3, HTN, chronic diastolic HF, paroxysmal afib on eliquis. He notes that approximately one week prior to presentation, he had 3 episodes of coffee ground emesis and melena. He additionally notes nausea, vomiting, and decreased appetite. He reports he has not been able to tolerate PO intake but does tolerate fluids. He has also noted some rhinorrhea and upper respiratory symptoms. Denies cough worse than baseline, chest pain, fevers. In ED, pt was hypotensive requiring 2L NSS. His BP responded appropriately to fluid resuscitation. Review of Systems:  Review of Systems   Constitutional:  Positive for activity change, appetite change and fatigue. Respiratory:  Positive for cough (at baseline) and shortness of breath. Negative for chest tightness. Cardiovascular:  Negative for chest pain. Gastrointestinal:  Positive for abdominal pain, constipation, nausea and vomiting. Genitourinary:  Positive for decreased urine volume, difficulty urinating and frequency. Negative for hematuria. Musculoskeletal:  Negative for neck pain and neck stiffness. Skin:  Positive for wound (ecchymosis left posterior thigh). Negative for color change. Neurological:  Positive for dizziness, weakness and light-headedness.         Past Medical History:   Diagnosis Date    Acute on chronic kidney failure      Alcohol withdrawal ([address]) 06/07/2019    Atrial fibrillation (HCC)     Cancer (HCC)     prostate ca,had radiation    Cardiac disease     stents,then triple bypass    COPD (chronic obstructive pulmonary disease) (HCC)     ETOH abuse     Hx of heart artery stent     2014    Hyperlipidemia     Hypertension     Hypovolemic shock ([address]) 12/22/2019    Lumbar spondylitis ([address]) 10/13/2022    Nasal bone fracture 10/10/2022    Prostate CA ([address])     S/P CABG x 3     2004    Sleep apnea      Past Surgical History:   Procedure Laterality Date    CARDIAC CATHETERIZATION      2 stents    CORONARY ARTERY BYPASS GRAFT  2004    [place] ARTHRD ANT INTERBODY MIN [address] CRV BELOW C2 N/A 12/16/2020    Procedure: Anterior cervical discectomy with fusion C4-C7; Posterior cervical decompression and fusion C2-T2;  Surgeon: Varsha Purdy MD;  Location: [facility];  Service: Neurosurgery    TONSILLECTOMY       No Known Allergies  Prior to Admission Medications   Prescriptions Last Dose Informant Patient Reported? Taking? Blood Glucose Monitoring Suppl (ONE TOUCH ULTRA MINI) w/Device KIT  Self Yes No   Sig: Use as directed   Breo Ellipta 200-25 MCG/ACT inhaler   No No   Sig: Inhale 1 puff daily Rinse mouth after use.    ONETOUCH DELICA LANCETS FINE MISC  Self Yes No   Sig: 3 (three) times a day Test   Thiamine HCl (vitamin B-1) 100 MG TABS  Self No No   Sig: TAKE 1 TABLET DAILY   albuterol (2.5 mg/3 mL) 0.083 % nebulizer solution   No No   Sig: Take 3 mL (2.5 mg total) by nebulization every 6 (six) hours as needed for wheezing or shortness of breath   albuterol (Ventolin HFA) 90 mcg/act inhaler   No No   Sig: Inhale 2 puffs every 4 (four) hours as needed for wheezing   apixaban (ELIQUIS) 5 mg   No No   Sig: Take 1 tablet (5 mg total) by mouth 2 (two) times a day   aspirin (ECOTRIN LOW STRENGTH) 81 mg EC tablet   Yes No   Sig: Take 81 mg by mouth daily   ferrous sulfate 325 (65 Fe) mg tablet   No No   Sig: Take 1 tablet (325 mg total) by mouth daily with breakfast   folic acid (FOLVITE) 1 mg tablet   No No   Sig: TAKE 1 TABLET DAILY   gabapentin (NEURONTIN) 300 mg capsule   No No   Sig: TAKE ONE CAPSULE THREE TIMES DAILY   lisinopril (ZESTRIL) 20 mg tablet   No No   Sig: Take 0.5 tablets (10 mg total) by mouth daily   magnesium Oxide (MAG-OX) 400 mg TABS   No No   Sig: TAKE 1 TABLET TWO TIMES A DAY   metFORMIN (GLUCOPHAGE) 500 mg tablet   No No   Sig: Take 1 tablet (500 mg total) by mouth daily with breakfast Do not start before September 23, 2023. metoprolol tartrate (LOPRESSOR) 25 mg tablet   No No   Sig: Take 1 tablet (25 mg total) by mouth every 12 (twelve) hours   naltrexone (REVIA) 50 mg tablet   No No   Sig: Take 1 tablet (50 mg total) by mouth daily Do not start before May 14, 2023.    nicotine (NICODERM CQ) 21 mg/24 hr TD 24 hr patch   No No   Sig: Place 1 patch on the skin over 24 hours every 24 hours   pantoprazole (PROTONIX) 40 mg tablet   No No   Sig: Take 1 tablet (40 mg total) by mouth daily   pravastatin (PRAVACHOL) 40 mg tablet   No No   Sig: TAKE 1 TABLET DAILY WITH DINNER   ranolazine (RANEXA) 500 mg 12 hr tablet   No No   Sig: TAKE 1 TABLET EVERY 12 HOURS   tamsulosin (FLOMAX) 0.4 mg   No No   Sig: TAKE 1 CAPSULE DAILY   varenicline (CHANTIX) 1 mg tablet   No No   Sig: TAKE 1 TABLET 2 TIMES A DAY      Facility-Administered Medications: None     Social History     Socioeconomic History    Marital status: Single     Spouse name: Not on file    Number of children: Not on file    Years of education: Not on file    Highest education level: Not on file   Occupational History    Occupation: contractor, 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)   Tobacco Use    Smoking status: Every Day     Packs/day: 1.50     Years: 40.00     Total pack years: 60.00     Types: Cigarettes    Smokeless tobacco: Never   Vaping Use    Vaping Use: Never used   Substance and Sexual Activity    Alcohol use: Not Currently     Alcohol/week: 4.0 standard drinks of alcohol     Types: 4 Standard drinks or equivalent per week    Drug use: No    Sexual activity: Not Currently   Other Topics Concern    Not on file   Social History Narrative    Not on file     Social Determinants of Health     Financial Resource Strain: Low Risk  (10/25/2022)    Overall Financial Resource Strain (CARDIA)     Difficulty of Paying Living Expenses: Not very hard   Recent Concern: Financial Resource Strain - Medium Risk (10/17/2022)    Overall Financial Resource Strain (CARDIA)     Difficulty of Paying Living Expenses: Somewhat hard   Food Insecurity: No Food Insecurity (10/25/2022)    Hunger Vital Sign     Worried About Running Out of Food in the Last Year: Never true     Ran Out of Food in the Last Year: Never true   Transportation Needs: No Transportation Needs (10/25/2022)    PRAPARE - Transportation     Lack of Transportation (Medical): No     Lack of Transportation (Non-Medical): No   Recent Concern: Transportation Needs - Unmet Transportation Needs (10/17/2022)    PRAPARE - Transportation     Lack of Transportation (Medical): Yes     Lack of Transportation (Non-Medical): Yes   Physical Activity: Not on file   Stress: Stress Concern Present (10/25/2022)    [facility]     Feeling of Stress :  To some extent   Social Connections: Unknown (4/15/2021)    Social Connection and Isolation Panel [NHANES]     Frequency of Communication with Friends and Family: More than three times a week     Frequency of Social Gatherings with Friends and Family: Not on file     Attends [employer]ism Services: Not on file     Active Member of Clubs or Organizations: Not on file     Attends Club or Organization Meetings: Not on file     Marital Status: Not on file   Intimate Partner Violence: Not on file   Housing Stability: High Risk (10/11/2022)    Housing Stability Vital Sign     Unable to Pay for Housing in the Last Year: No     Number of Places Lived in the Last Year: 1     Unstable Housing in the Last Year: Yes     Family History   Problem Relation Age of Onset    Diabetes Mother     Uterine cancer Mother     COPD Father     Hypertension Father         Patient Pre-hospital Living Situation: home  Patient Pre-hospital Level of Mobility: no mobility aids  Patient Pre-hospital Diet Restrictions: carb controlled          Objective     Physical Exam:   Vitals:   Blood Pressure: 134/71 (10/30/23 0048)  Pulse: 88 (10/30/23 0048)  Temperature: 97.7 °F (36.5 °C) (10/30/23 0048)  Respirations: 22 (10/30/23 0048)  Height: 6' 2" (188 cm) (10/30/23 0048)  Weight - Scale: 92.5 kg (204 lb) (10/30/23 0048)  SpO2: 98 % (10/30/23 0048)     Physical Exam  Vitals reviewed. Constitutional:       General: He is not in acute distress. HENT:      Head: Normocephalic and atraumatic. Right Ear: External ear normal.      Left Ear: External ear normal.      Mouth/Throat:      Pharynx: Oropharynx is clear. Eyes:      Conjunctiva/sclera: Conjunctivae normal.   Cardiovascular:      Rate and Rhythm: Normal rate. Heart sounds: No friction rub. Pulmonary:      Effort: Pulmonary effort is normal. No respiratory distress. Abdominal:      Palpations: Abdomen is soft. Tenderness: There is no abdominal tenderness. There is no guarding. Musculoskeletal:         General: Signs of injury (left posterior thigh hematoma) present. Cervical back: No rigidity. Skin:     General: Skin is warm and dry. Neurological:      General: No focal deficit present. Mental Status: He is oriented to person, place, and time. Lab Results: I have personally reviewed pertinent reports.     Results from last 7 days   Lab Units 10/29/23  2048   WBC Thousand/uL 9.03   HEMOGLOBIN g/dL 12.8   HEMATOCRIT % 36.4*   PLATELETS Thousands/uL 99*   NEUTROS PCT % 70   LYMPHS PCT % 17   MONOS PCT % 10   EOS PCT % 3     Results from last 7 days   Lab Units 10/29/23  2048   POTASSIUM mmol/L 5.0   CHLORIDE mmol/L 91*   CO2 mmol/L 20*   BUN mg/dL 46*   CREATININE mg/dL 2.11*   CALCIUM mg/dL 8.7   ALK PHOS U/L 58   ALT U/L 40   AST U/L 51*   EGFR ml/min/1.73sq m 32   MAGNESIUM mg/dL 1.4*         Results from last 7 days   Lab Units 10/29/23  2048   LACTIC ACID mmol/L 1.9              Results from last 7 days   Lab Units 10/29/23  2246   COLOR UA  Yellow   CLARITY UA  Clear   SPEC GRAV UA  <=1.005   PH UA  5.5   LEUKOCYTES UA  Negative   NITRITE UA  Negative   GLUCOSE UA mg/dl Negative   KETONES UA mg/dl Trace*   BILIRUBIN UA  Small*   BLOOD UA  Negative           Results from last 7 days   Lab Units 10/29/23  2246 10/29/23  2048   HS TNI 0HR ng/L  --  38   HS TNI 2HR ng/L 37  --              Imaging: I have personally reviewed pertinent reports. No results found.       EKG, Pathology, and Other Studies Reviewed on Admission:   EKG  Result Date: 10/30/23  Impression:    NSR           Entire H&P was discussed with Dr. Patrick Browne who agreed to what is noted above     ** Please Note: This note has been constructed using a voice recognition system **     Nabila Del Rosario DO  10/30/23  1:06 AM\

## 2023-10-30 NOTE — PHYSICAL THERAPY NOTE
PT Cancel Note     10/30/23 7824   Note Type   Additional Comments PT orders received - pt discharged prior to PT evaluation.    Licensure   [place] License Number  Laury Zara [id number]

## 2023-10-30 NOTE — ASSESSMENT & PLAN NOTE
Hypotensive on admission    BP improved after IV fluid bolus in the ED.   Home meds: Lisinopril 10 mg, Lopressor 25 mg every 12 hrs    Plan:  - Hold Lisinopril given elevated creatinine  - Continue Lopressor with hold parameters  - Monitor V/S

## 2023-10-30 NOTE — ASSESSMENT & PLAN NOTE
Chronic. Home meds:  Folate, Thiamine, Naltrexone     Last drink reported 10 days ago  Ethanol level <10  AST 51, ALT 40    Plan:   - Continue home Folate, Thiamine  - [facility] protocol  - Hold naltrexone

## 2023-10-30 NOTE — ASSESSMENT & PLAN NOTE
Chronic, worsening. Plt 99 on admission, baseline approximately . Pt has history of alcohol abuse, on chronic ASA therapy, and reports melena.      Plan:   -Patient is currently NPO  -GI consulted  -Pending manual smear

## 2023-10-30 NOTE — ASSESSMENT & PLAN NOTE
Chronic, rate controlled  Home meds: Eliquis 5 mg BID, Lopressor 25 mg every 12 hrs, Ranexa 500 mg every 12 hrs  EKG on admission showed NSR at 80 BPM    Plan:  - Hold Ranexa given elevated creatinine  - Hold Eliquis  -Continue Lopressor

## 2023-10-30 NOTE — OCCUPATIONAL THERAPY NOTE
Occupational Therapy Screen     Patient Name: Harjeet Perales  YMJLG'H Date: 10/30/2023  Problem List  Principal Problem:    Melena  Active Problems:    COPD (chronic obstructive pulmonary disease) ([address])    Type 2 diabetes mellitus ([address])    Essential hypertension    CAD (coronary artery disease)    Thrombocytopenia (HCC)    Alcohol abuse    Chronic heart failure with preserved ejection fraction (HCC)    History of Atrial fibrillation ([address])    Stage 3a chronic kidney disease ([address])    Past Medical History  Past Medical History:   Diagnosis Date    Acute on chronic kidney failure      Alcohol withdrawal ([address]) 06/07/2019    Atrial fibrillation (HCC)     Cancer (HCC)     prostate ca,had radiation    Cardiac disease     stents,then triple bypass    COPD (chronic obstructive pulmonary disease) (HCC)     ETOH abuse     Hx of heart artery stent     2014    Hyperlipidemia     Hypertension     Hypovolemic shock ([address]) 12/22/2019    Lumbar spondylitis ([address]) 10/13/2022    Nasal bone fracture 10/10/2022    Prostate CA ([address])     S/P CABG x 3     2004    Sleep apnea      Past Surgical History  Past Surgical History:   Procedure Laterality Date    CARDIAC CATHETERIZATION      2 stents    CORONARY ARTERY BYPASS GRAFT  2004    [place] ARTHRD ANT INTERBODY MIN [address] CRV BELOW C2 N/A 12/16/2020    Procedure: Anterior cervical discectomy with fusion C4-C7; Posterior cervical decompression and fusion C2-T2;  Surgeon: Fito Betancur MD;  Location[facility];  Service: Neurosurgery    TONSILLECTOMY          10/30/23 1058   OT Last Visit   OT Visit Date 10/30/23  (Monday)   Note Type   Note type Screen   Additional Comments OT orders received and chart review completed. Spoke w/ RN, Tulio Segura. Pt reports pt is completing ADLs at / near baseline level of I and DC home. No skilled OT needs at this time. Will DC OT. Please re consult if acute needs arise/ change in functional status.  [address], OTR/L  [id number]  [id number]

## 2023-10-30 NOTE — ASSESSMENT & PLAN NOTE
Patient reporting episodes of melena, most recent 3 days ago. Stool occult positive in the ED. Hemoglobin stable, platelet 99. Patient is on Eliquis and chronic aspirin therapy and has a history of alcohol abuse.     BUN 46, Cr 2.11. BUN/Cr ratio 21    Plan:  -Consult to GI  -Hold ASA and Eliquis  -Patient is currently NPO, sips with meds  -Recheck AM CBC  -START fluids 75 cc/hr  -START IV Protonix 40 mg BID

## 2023-10-30 NOTE — UTILIZATION REVIEW
Initial Clinical Review    Admission: Date/Time/Statement:   Admission Orders (From admission, onward)       Ordered        10/29/23 2207  INPATIENT ADMISSION  Once                          Orders Placed This Encounter   Procedures    INPATIENT ADMISSION     Standing Status:   Standing     Number of Occurrences:   1     Order Specific Question:   Level of Care     Answer:   Med Surg [16]     Order Specific Question:   Estimated length of stay     Answer:   More than 2 Midnights     Order Specific Question:   Certification     Answer:   I certify that inpatient services are medically necessary for this patient for a duration of greater than two midnights. See H&P and MD Progress Notes for additional information about the patient's course of treatment. ED Arrival Information       Expected   -    Arrival   10/29/2023 20:13    Acuity   Emergent              Means of arrival   Walk-In    Escorted by   Self    Service   Hospitalist    Admission type   Emergency              Arrival complaint   sob, weakness, runny nose             Chief Complaint   Patient presents with    Dizziness     Not feeling well for a week, lightheaded, runny nose and weakness       Initial Presentation:   66 yom to ER from home c/o feeling lightheaded generalized weakness fatigue for the past week not feeling well, some dark stools. Reports that approximately one week prior to presentation, he had 3 episodes of coffee ground emesis and melena. Hx alcohol abuse, CAD, thrombocytopenia, PAD, CKD3, HTN, chronic diastolic HF, paroxysmal afib on eliquis. Presents hypotensive, s/s as above. Admission work-up showing hyponatremia, metabolic acidosis, thrombocytopenia. Admitted to inpatient status for metabolic acidosis. Started on IVF, IV PPI.      ED Triage Vitals   Temperature Pulse Respirations Blood Pressure SpO2   10/29/23 2023 10/29/23 2023 10/29/23 2023 10/29/23 2030 10/29/23 2023   98 °F (36.7 °C) 89 20 (!) 76/40 96 %      Temp Source Heart Rate Source Patient Position - Orthostatic VS BP Location FiO2 (%)   10/30/23 0048 10/29/23 2023 10/29/23 2023 10/29/23 2023 --   Axillary Monitor Sitting Left arm       Pain Score       10/30/23 0048       No Pain          Wt Readings from Last 1 Encounters:   10/30/23 92.5 kg (204 lb)     Additional Vital Signs:   Date/Time Temp Pulse Resp BP MAP (mmHg) SpO2 O2 Device Patient Position - Orthostatic VS   10/30/23 0730 97.4 °F (36.3 °C) Abnormal  71 21 134/82 103 96 % None (Room air) Lying   10/30/23 0600 -- 71 20 -- -- 94 % -- --   10/30/23 0248 97.6 °F (36.4 °C) 71 20 108/56 78 94 % None (Room air) Lying   10/30/23 0048 97.7 °F (36.5 °C) 88 22 134/71 94 98 % None (Room air) Lying   10/30/23 0015 -- 84 20 145/83 108 97 % None (Room air) Lying   10/30/23 0000 -- 82 19 142/78 104 96 % None (Room air) Lying   10/29/23 2330 -- 78 21 154/82 111 96 % None (Room air) Lying   10/29/23 2300 -- 78 19 157/84 113 96 % None (Room air) Lying   10/29/23 2230 -- 77 19 118/73 92 97 % None (Room air) Lying   10/29/23 2200 -- 71 18 116/62 83 96 % None (Room air) Lying   10/29/23 2145 -- 73 19 119/68 88 95 % None (Room air) Lying   10/29/23 2130 -- 72 21 117/65 84 96 % None (Room air) Lying   10/29/23 2100 -- 80 20 110/63 82 94 % None (Room air) Lying   10/29/23 2045 -- 80 20 108/60 77 95 % None (Room air) Lying   10/29/23 2030 -- -- -- 76/40 Abnormal  -- -- -- --   10/29/23 2023 98 °F (36.7 °C) 89 20 -- -- 96 % None (Room air) Sitting     Pertinent Labs/Diagnostic Test Results:   No orders to display     Results from last 7 days   Lab Units 10/29/23  2103   SARS-COV-2  Negative     Results from last 7 days   Lab Units 10/30/23  0552 10/29/23  2048   WBC Thousand/uL 6.55 9.03   HEMOGLOBIN g/dL 11.9* 12.8   HEMATOCRIT % 33.9* 36.4*   PLATELETS Thousands/uL 80* 99*   NEUTROS ABS Thousands/µL 3.90 6.27     Results from last 7 days   Lab Units 10/30/23  0552 10/29/23  2048   SODIUM mmol/L 131* 126*   POTASSIUM mmol/L 3.6 5.0   CHLORIDE mmol/L 98 91*   CO2 mmol/L 23 20*   ANION GAP mmol/L 10 15   BUN mg/dL 35* 46*   CREATININE mg/dL 1.29 2.11*   EGFR ml/min/1.73sq m 59 32   CALCIUM mg/dL 8.1* 8.7   MAGNESIUM mg/dL 1.7* 1.4*   PHOSPHORUS mg/dL 2.3  --      Results from last 7 days   Lab Units 10/30/23  0552 10/29/23  2048   AST U/L 32 51*   ALT U/L 31 40   ALK PHOS U/L 53 58   TOTAL PROTEIN g/dL 6.1* 7.2   ALBUMIN g/dL 3.5 4.1   TOTAL BILIRUBIN mg/dL 0.79 0.93     Results from last 7 days   Lab Units 10/30/23  0739 10/29/23  2102   POC GLUCOSE mg/dl 89 115     Results from last 7 days   Lab Units 10/30/23  0552 10/29/23  2048   GLUCOSE RANDOM mg/dL 78 102     Results from last 7 days   Lab Units 10/30/23  0126 10/29/23  2246 10/29/23  2048   HS TNI 0HR ng/L  --   --  38   HS TNI 2HR ng/L  --  37  --    HSTNI D2 ng/L  --  -1  --    HS TNI 4HR ng/L 35  --   --    HSTNI D4 ng/L -3  --   --      Results from last 7 days   Lab Units 10/29/23  2048   LACTIC ACID mmol/L 1.9     Results from last 7 days   Lab Units 10/29/23  2048   LIPASE u/L 77     Results from last 7 days   Lab Units 10/29/23  2246   CLARITY UA  Clear   COLOR UA  Yellow   SPEC GRAV UA  <=1.005   PH UA  5.5   GLUCOSE UA mg/dl Negative   KETONES UA mg/dl Trace*   BLOOD UA  Negative   PROTEIN UA mg/dl Negative   NITRITE UA  Negative   BILIRUBIN UA  Small*   UROBILINOGEN UA E.U./dl 0.2   LEUKOCYTES UA  Negative     Results from last 7 days   Lab Units 10/29/23  2103   INFLUENZA A PCR  Negative   INFLUENZA B PCR  Negative   RSV PCR  Negative     Results from last 7 days   Lab Units 10/29/23  2048   ETHANOL LVL mg/dL <10     Results from last 7 days   Lab Units 10/29/23  2054 10/29/23  2048   BLOOD CULTURE  Received in Microbiology Lab. Culture in Progress. Received in Microbiology Lab. Culture in Progress.      ED Treatment:   Medication Administration from 10/29/2023 2012 to 10/30/2023 0047         Date/Time Order Dose Route Action     10/29/2023 2100 EDT sodium chloride 0.9 % bolus 1,000 mL 1,000 mL Intravenous New Bag     10/29/2023 2100 EDT sodium chloride 0.9 % bolus 1,000 mL 1,000 mL Intravenous New Bag     10/29/2023 2206 EDT magnesium sulfate 2 g/50 mL IVPB (premix) 2 g 2 g Intravenous New Bag     10/29/2023 2217 EDT pantoprazole (PROTONIX) injection 40 mg 40 mg Intravenous Given     10/30/2023 0013 EDT metoprolol tartrate (LOPRESSOR) tablet 25 mg 25 mg Oral Given     10/30/2023 0005 EDT sodium chloride 0.9 % infusion 75 mL/hr Intravenous New Bag     10/29/2023 2316 EDT pantoprazole (PROTONIX) injection 40 mg 40 mg Intravenous Not Given          Past Medical History:   Diagnosis Date    Acute on chronic kidney failure      Alcohol withdrawal ([address]) 06/07/2019    Atrial fibrillation ([address])     Cancer (HCC)     prostate ca,had radiation    Cardiac disease     stents,then triple bypass    COPD (chronic obstructive pulmonary disease) ([address])     ETOH abuse     Hx of heart artery stent     2014    Hyperlipidemia     Hypertension     Hypovolemic shock ([address]) 12/22/2019    Lumbar spondylitis ([address]) 10/13/2022    Nasal bone fracture 10/10/2022    Prostate CA ([address])     S/P CABG x 3     2004    Sleep apnea      Present on Admission:   Type 2 diabetes mellitus (HCC)   Chronic heart failure with preserved ejection fraction (HCC)   Hyponatremia   Stage 3a chronic kidney disease (HCC)   Essential hypertension   Hypomagnesemia   Thrombocytopenia (HCC)   Alcohol abuse   History of Atrial fibrillation (HCC)   COPD (chronic obstructive pulmonary disease) (HCC)   CAD (coronary artery disease)   Metabolic acidosis      Admitting Diagnosis: Melena [K92.1]  Hypomagnesemia [E83.42]  Dizziness [R42]  Hyponatremia [E87.1]  Renal failure [N19]  Near syncope [R55]  Age/Sex: 66 y.o. male  Admission Orders:  Accuchecks  Pt/ot eval & tx  Consult GI  Scd/foot pumps  CIWA protocol  Cont pulse ox    Scheduled Medications:  ferrous sulfate, 325 mg, Oral, Daily With Breakfast  fluticasone-vilanterol, 1 puff, Inhalation, Daily  folic acid, 1,000 mcg, Oral, Daily  insulin lispro, 1-6 Units, Subcutaneous, 4x Daily (AC & HS)  magnesium sulfate, 2 g, Intravenous, Once  metoprolol tartrate, 25 mg, Oral, Q12H SEDA  nicotine, 1 patch, Transdermal, Q24H  pantoprazole, 40 mg, Intravenous, Q12H SEDA  pravastatin, 40 mg, Oral, Daily With Dinner  tamsulosin, 0.4 mg, Oral, Daily  vitamin B-1, 100 mg, Oral, Daily    Continuous IV Infusions:  sodium chloride, 75 mL/hr, Intravenous, Continuous    PRN Meds:  acetaminophen, 650 mg, Oral, Q6H PRN  albuterol, 2.5 mg, Nebulization, Q6H PRN  polyethylene glycol, 17 g, Oral, Daily PRN  Network Utilization Review Department  ATTENTION: Please call with any questions or concerns to [phone] and carefully listen to the prompts so that you are directed to the right person. All voicemails are confidential.   For Discharge needs, contact Care Management DC Support Team at [phone] opt. 2  Send all requests for admission clinical reviews, approved or denied determinations and any other requests to dedicated fax number below belonging to the campus where the patient is receiving treatment.  List of dedicated fax numbers for the Facilities:  [place] DENIALS (Administrative/Medical Necessity) [phone]   DISCHARGE SUPPORT TEAM (NETWORK) [id number] (Maternity/NICU/Pediatrics) [phone]   [address] [place] [address] Drive [phone]   [facility] [address] [facility] [phone]   [address] [place] [address] [address] [place] [address] [facility] [phone]   [address] [facility] [phone]   [address] [place] [address] [address] [phone]

## 2023-10-31 ENCOUNTER — PATIENT OUTREACH (OUTPATIENT)
Dept: FAMILY MEDICINE CLINIC | Facility: CLINIC | Age: 63
End: 2023-10-31

## 2023-10-31 DIAGNOSIS — F10.10 ALCOHOL ABUSE: Primary | Chronic | ICD-10-CM

## 2023-10-31 DIAGNOSIS — Z71.89 COMPLEX CARE COORDINATION: Primary | ICD-10-CM

## 2023-10-31 DIAGNOSIS — F33.9 DEPRESSION, RECURRENT (HCC): ICD-10-CM

## 2023-10-31 LAB
ATRIAL RATE: 80 BPM
BACTERIA UR CULT: NORMAL
P AXIS: 58 DEGREES
PR INTERVAL: 134 MS
QRS AXIS: 72 DEGREES
QRSD INTERVAL: 86 MS
QT INTERVAL: 390 MS
QTC INTERVAL: 449 MS
T WAVE AXIS: 33 DEGREES
VENTRICULAR RATE: 80 BPM

## 2023-10-31 PROCEDURE — 93010 ELECTROCARDIOGRAM REPORT: CPT | Performed by: INTERNAL MEDICINE

## 2023-10-31 NOTE — PROGRESS NOTES
Referral received. Pt known to this RN CM. Chart review completed for the following sections:  Recent Vital Signs  Allergies/Contradictions  Medication Review   History   [facility]   Problem List  Immunizations  Past hospitalizations and major procedures, including surgery  Significant past illnesses and treatment history including: History and Physical and Medications/Infusions/Transfusions  Relevant past medications related to the patient's condition    Pt signed out of this most recent admission. Pt was NPO for an EGD. Pt no longer wanted to be NPO. Due to patients history of alcoholism referral placed for  CM outreach. Outreach attempted. LVM requesting return call. RN CM contact information provided.

## 2023-10-31 NOTE — UTILIZATION REVIEW
NOTIFICATION OF ADMISSION DISCHARGE   This is a Notification of Discharge from [address] [place] [address]. Please be advised that this patient has been discharge from our facility. Below you will find the admission and discharge date and time including the patient’s disposition. UTILIZATION REVIEW CONTACT:  Ricki Huston  Utilization   Network Utilization Review Department  Phone: [phone] x carefully listen to the prompts. All voicemails are confidential.  Email: [email or web address]     ADMISSION INFORMATION  PRESENTATION DATE: 10/29/2023  8:31 PM  OBERVATION ADMISSION DATE:   INPATIENT ADMISSION DATE: 10/29/23 10:07 PM   DISCHARGE DATE: 10/30/2023 12:45 PM   DISPOSITION:Left against medical advice or discontinued care    Network Utilization Review Department  ATTENTION: Please call with any questions or concerns to [phone] and carefully listen to the prompts so that you are directed to the right person. All voicemails are confidential.   For Discharge needs, contact Care Management DC Support Team at [phone] opt. 2  Send all requests for admission clinical reviews, approved or denied determinations and any other requests to dedicated fax number below belonging to the campus where the patient is receiving treatment.  List of dedicated fax numbers for the Facilities:  [place] DENIALS (Administrative/Medical Necessity) [phone]   DISCHARGE SUPPORT TEAM (Network) [phone]   [facility] (Maternity/NICU/Pediatrics) [phone]   [address] [facility] [address] [facility] [address] [facility] [address] [facility] [address] [place] [address] [address] [facility] [address] [facility]  Cty [facility] [phone]

## 2023-11-02 ENCOUNTER — PATIENT OUTREACH (OUTPATIENT)
Dept: FAMILY MEDICINE CLINIC | Facility: CLINIC | Age: 63
End: 2023-11-02

## 2023-11-02 NOTE — PROGRESS NOTES
[facility] had received a referral from RN DESIREE Swain r/t [address] and alcohol abuse. [facility] had completed a chart review. Per chart, patient has a hx of alcohol abuse. Per chart, patient was going to [facility] [phone] for alcohol abuse and MH services. Per chart, patient was in the ED due to chest pain recently and melena. [facility] had called the patient via phone. SW introduced herself and reason for consult. SWCM asked patient how he is doing. Patient stated he is feeling better. SWCM asked patient if he scheduled with cardiology. Patient stated he did not. Patient stated he scheduled with GI for tomorrow. SWCM encouraged patient scheduled his cardiology appointment. Patient understood. SW asked patient when he last drank alcohol. Patient reported that he last drank was about 3-4 months ago. Patient stated he still goes to [facility]. Patient stated he is having issues with them as they found Benzodiazepines in system so failed drug test. Patient stated he does not do drugs so test is false. Patient stated he needs to complete the 16 week program to obtain his license again. Patient stated he is now going to be fined and/or thrown in long-term for failed drug test.    [facility] discussed with patient maybe going to another OP tx office. Patient said "I have too much time invested at [employer] for [facility]. I am not starting tx again somewhere else." [facility] encouraged patient speak with them about a . Patient reported he may do that. [facility] discussed with patient getting ICMs services through [facility]. SW explained that they work closely with patients that suffer from [address] and D&A. Patient denied needing this services at this time. SWCM encouraged patient reach out if he changes his mind. Patient understood. Patient stated he lives alone in a housing unit. Patient stated his son is his main support.  Patient stated he has SS income and SNAP benefits. Patient stated he uses the bus, [address] or cab for transportation. Patient did not report any housing issues at this time. Patient stated he needs Naltrexone 50 mg refilled. SW informed the patient that she would send message to PCP about this medication so they can f/u Patient agreed to plan. Patient denied any other needs at this time. SWCM provided her contact information. SWCM advised patient reach out as needed. Patient understood. Patient denied any continued  outreach at this time. SWCM sent in basket to PCP as discussed. SWCM routed note to [facility]. [facility] closed referral. Please reconsult.

## 2023-11-03 ENCOUNTER — HOSPITAL ENCOUNTER (EMERGENCY)
Facility: HOSPITAL | Age: 63
Discharge: HOME | End: 2023-11-04
Attending: EMERGENCY MEDICINE
Payer: COMMERCIAL

## 2023-11-03 ENCOUNTER — OFFICE VISIT (OUTPATIENT)
Dept: GASTROENTEROLOGY | Facility: CLINIC | Age: 63
End: 2023-11-03
Payer: COMMERCIAL

## 2023-11-03 VITALS
DIASTOLIC BLOOD PRESSURE: 54 MMHG | SYSTOLIC BLOOD PRESSURE: 104 MMHG | BODY MASS INDEX: 26.69 KG/M2 | HEIGHT: 74 IN | HEART RATE: 72 BPM | WEIGHT: 208 LBS

## 2023-11-03 VITALS
TEMPERATURE: 97.3 F | RESPIRATION RATE: 20 BRPM | HEART RATE: 68 BPM | SYSTOLIC BLOOD PRESSURE: 129 MMHG | OXYGEN SATURATION: 96 % | DIASTOLIC BLOOD PRESSURE: 63 MMHG

## 2023-11-03 DIAGNOSIS — F10.929 ALCOHOL INTOXICATION (HCC): ICD-10-CM

## 2023-11-03 DIAGNOSIS — F10.10 ALCOHOL ABUSE: Chronic | ICD-10-CM

## 2023-11-03 DIAGNOSIS — E44.1 MILD PROTEIN-CALORIE MALNUTRITION (HCC): ICD-10-CM

## 2023-11-03 DIAGNOSIS — E83.42 HYPOMAGNESEMIA: Primary | ICD-10-CM

## 2023-11-03 DIAGNOSIS — Z86.79 HISTORY OF ATRIAL FIBRILLATION: ICD-10-CM

## 2023-11-03 DIAGNOSIS — K92.1 MELENA: Primary | ICD-10-CM

## 2023-11-03 DIAGNOSIS — K21.9 GASTROESOPHAGEAL REFLUX DISEASE, UNSPECIFIED WHETHER ESOPHAGITIS PRESENT: ICD-10-CM

## 2023-11-03 DIAGNOSIS — Z12.11 COLON CANCER SCREENING: ICD-10-CM

## 2023-11-03 DIAGNOSIS — I95.1 ORTHOSTATIC HYPOTENSION: ICD-10-CM

## 2023-11-03 DIAGNOSIS — K70.0 FATTY LIVER, ALCOHOLIC: ICD-10-CM

## 2023-11-03 LAB
2HR DELTA HS TROPONIN: 0 NG/L
ALBUMIN SERPL BCP-MCNC: 3.7 G/DL (ref 3.5–5)
ALP SERPL-CCNC: 52 U/L (ref 34–104)
ALT SERPL W P-5'-P-CCNC: 32 U/L (ref 7–52)
ANION GAP SERPL CALCULATED.3IONS-SCNC: 6 MMOL/L
AST SERPL W P-5'-P-CCNC: 25 U/L (ref 13–39)
BACTERIA UR QL AUTO: ABNORMAL /HPF
BASO STIPL BLD QL SMEAR: PRESENT
BASOPHILS # BLD MANUAL: 0 THOUSAND/UL (ref 0–0.1)
BASOPHILS NFR MAR MANUAL: 0 % (ref 0–1)
BILIRUB SERPL-MCNC: 0.37 MG/DL (ref 0.2–1)
BILIRUB UR QL STRIP: NEGATIVE
BUN SERPL-MCNC: 18 MG/DL (ref 5–25)
CALCIUM SERPL-MCNC: 8.8 MG/DL (ref 8.4–10.2)
CARDIAC TROPONIN I PNL SERPL HS: 11 NG/L
CARDIAC TROPONIN I PNL SERPL HS: 11 NG/L
CHLORIDE SERPL-SCNC: 98 MMOL/L (ref 96–108)
CLARITY UR: CLEAR
CO2 SERPL-SCNC: 27 MMOL/L (ref 21–32)
COLOR UR: ABNORMAL
CREAT SERPL-MCNC: 1.26 MG/DL (ref 0.6–1.3)
EOSINOPHIL # BLD MANUAL: 0.19 THOUSAND/UL (ref 0–0.4)
EOSINOPHIL NFR BLD MANUAL: 3 % (ref 0–6)
ERYTHROCYTE [DISTWIDTH] IN BLOOD BY AUTOMATED COUNT: 13.7 % (ref 11.6–15.1)
GFR SERPL CREATININE-BSD FRML MDRD: 61 ML/MIN/1.73SQ M
GLUCOSE SERPL-MCNC: 120 MG/DL (ref 65–140)
GLUCOSE UR STRIP-MCNC: NEGATIVE MG/DL
HCT VFR BLD AUTO: 33.8 % (ref 36.5–49.3)
HGB BLD-MCNC: 11.2 G/DL (ref 12–17)
HGB UR QL STRIP.AUTO: NEGATIVE
KETONES UR STRIP-MCNC: ABNORMAL MG/DL
LEUKOCYTE ESTERASE UR QL STRIP: NEGATIVE
LYMPHOCYTES # BLD AUTO: 1.23 THOUSAND/UL (ref 0.6–4.47)
LYMPHOCYTES # BLD AUTO: 20 % (ref 14–44)
MAGNESIUM SERPL-MCNC: 1.6 MG/DL (ref 1.9–2.7)
MCH RBC QN AUTO: 33.4 PG (ref 26.8–34.3)
MCHC RBC AUTO-ENTMCNC: 33.1 G/DL (ref 31.4–37.4)
MCV RBC AUTO: 101 FL (ref 82–98)
MONOCYTES # BLD AUTO: 0.49 THOUSAND/UL (ref 0–1.22)
MONOCYTES NFR BLD: 8 % (ref 4–12)
MUCOUS THREADS UR QL AUTO: ABNORMAL
NEUTROPHILS # BLD MANUAL: 4.26 THOUSAND/UL (ref 1.85–7.62)
NEUTS BAND NFR BLD MANUAL: 1 % (ref 0–8)
NEUTS SEG NFR BLD AUTO: 68 % (ref 43–75)
NITRITE UR QL STRIP: NEGATIVE
NON-SQ EPI CELLS URNS QL MICRO: ABNORMAL /HPF
PH UR STRIP.AUTO: 5.5 [PH]
PLATELET # BLD AUTO: 127 THOUSANDS/UL (ref 149–390)
PLATELET BLD QL SMEAR: ABNORMAL
PMV BLD AUTO: 10.2 FL (ref 8.9–12.7)
POTASSIUM SERPL-SCNC: 4.5 MMOL/L (ref 3.5–5.3)
PROT SERPL-MCNC: 6.5 G/DL (ref 6.4–8.4)
PROT UR STRIP-MCNC: ABNORMAL MG/DL
RBC # BLD AUTO: 3.35 MILLION/UL (ref 3.88–5.62)
RBC #/AREA URNS AUTO: ABNORMAL /HPF
RBC MORPH BLD: PRESENT
SODIUM SERPL-SCNC: 131 MMOL/L (ref 135–147)
SP GR UR STRIP.AUTO: 1.02 (ref 1–1.03)
UROBILINOGEN UR QL STRIP.AUTO: 1 E.U./DL
WBC # BLD AUTO: 6.17 THOUSAND/UL (ref 4.31–10.16)
WBC #/AREA URNS AUTO: ABNORMAL /HPF

## 2023-11-03 PROCEDURE — 99285 EMERGENCY DEPT VISIT HI MDM: CPT | Performed by: EMERGENCY MEDICINE

## 2023-11-03 PROCEDURE — 81001 URINALYSIS AUTO W/SCOPE: CPT | Performed by: EMERGENCY MEDICINE

## 2023-11-03 PROCEDURE — 84484 ASSAY OF TROPONIN QUANT: CPT | Performed by: EMERGENCY MEDICINE

## 2023-11-03 PROCEDURE — 96361 HYDRATE IV INFUSION ADD-ON: CPT

## 2023-11-03 PROCEDURE — 83735 ASSAY OF MAGNESIUM: CPT | Performed by: EMERGENCY MEDICINE

## 2023-11-03 PROCEDURE — 80053 COMPREHEN METABOLIC PANEL: CPT | Performed by: EMERGENCY MEDICINE

## 2023-11-03 PROCEDURE — 96365 THER/PROPH/DIAG IV INF INIT: CPT

## 2023-11-03 PROCEDURE — 99285 EMERGENCY DEPT VISIT HI MDM: CPT

## 2023-11-03 PROCEDURE — 85027 COMPLETE CBC AUTOMATED: CPT | Performed by: EMERGENCY MEDICINE

## 2023-11-03 PROCEDURE — 93005 ELECTROCARDIOGRAM TRACING: CPT

## 2023-11-03 PROCEDURE — 36415 COLL VENOUS BLD VENIPUNCTURE: CPT | Performed by: EMERGENCY MEDICINE

## 2023-11-03 PROCEDURE — 85007 BL SMEAR W/DIFF WBC COUNT: CPT | Performed by: EMERGENCY MEDICINE

## 2023-11-03 PROCEDURE — 99214 OFFICE O/P EST MOD 30 MIN: CPT | Performed by: NURSE PRACTITIONER

## 2023-11-03 RX ORDER — MAGNESIUM SULFATE HEPTAHYDRATE 40 MG/ML
2 INJECTION, SOLUTION INTRAVENOUS ONCE
Status: COMPLETED | OUTPATIENT
Start: 2023-11-03 | End: 2023-11-03

## 2023-11-03 RX ORDER — NALTREXONE HYDROCHLORIDE 50 MG/1
50 TABLET, FILM COATED ORAL DAILY
Qty: 30 TABLET | Refills: 2 | Status: SHIPPED | OUTPATIENT
Start: 2023-11-03

## 2023-11-03 RX ORDER — PANTOPRAZOLE SODIUM 40 MG/1
40 TABLET, DELAYED RELEASE ORAL 2 TIMES DAILY
Qty: 60 TABLET | Refills: 1 | Status: SHIPPED | OUTPATIENT
Start: 2023-11-03

## 2023-11-03 RX ADMIN — SODIUM CHLORIDE 1000 ML: 0.9 INJECTION, SOLUTION INTRAVENOUS at 21:20

## 2023-11-03 RX ADMIN — MAGNESIUM SULFATE HEPTAHYDRATE 2 G: 40 INJECTION, SOLUTION INTRAVENOUS at 22:11

## 2023-11-03 NOTE — PROGRESS NOTES
[facility] [address] [facility] Consultation  Nitesh Rosenberg 66 y.o. male MRN: [id number]  Encounter: [id number]          ASSESSMENT AND PLAN:      1. Melena: Pt reports 5 day history of melena and coffee ground emesis last week with associated weakness & decreased appetite prompting presentation to the ED. He was hypotensive in the ED with BP 76/40 with sodium 126, BUN 46/Cr 2.1 with Hgb 12.8 which dropped to 11.9 after hydration. Sodium improved to 131, BUN trended down to 35, & creat elevation resolved. He ended up leaving AMA because he didn't want to remain NPO for EGD. He reports N/V has subsided but BM are still somewhat black in color but also now have some brown to them. He also remains very weak with dizziness with positional changes. He has been taking his Eliquis and BP meds. He was started on the Eliquis at the end of September due to AFib. Ddx include PUD, AVM, lesion, PHG and coagulopathy. Pt denies any BRBPR or hematemesis. -Increase PPI to BID dosing  -Avoid all NSAIDs  -Recommend pt present back to ED for further evaluation/treatment & consideration of cardiac/GI workup  -Pt originally refused so labs & EGD/Colonoscopy ordered but now is agreeable & will present tonight.  -     CBC and differential; Future  -     EGD; Future; Expected date: 11/03/2023   pantoprazole (PROTONIX) 40 mg tablet; Take 1 tablet (40 mg total) by mouth 2 (two) times a day    2. Orthostatic hypotension  Pt with weakness, & hypotension with positional changes. Recommended ED evaluation. BP dropped to SBP of 84 when sitting up from exam table then normalized to 105    4. History of atrial fibrillation  On Eliquis, reports palpitations frequently, HR 72 in office    5. Fatty liver, alcoholic  6. Mild protein-calorie malnutrition ([address])  Pt reports sobriety for 4 months, prior to that was drinking a quart of vodka daily.  He is now trying to get his license back and is attending a program which requires urine samples. LFTs normalized on last labs 10/30 but he does have hyponatremia/thrombocytopenia. Last imaging of the liver was in 10/2022 which showed fatty liver had improved. He had liver serology workup in 10/2022 showing weak positive ASMA and weak positive HCV Ab but viral load was neg.  -Continue alcohol cessation  -Would recommend checking INR  -With EGD can evaluate for secondary signs of cirrhosis such as portal hypertensive gastropathy or esophageal varices  -Consider US elastography to assess for fibrosis  -     Comprehensive metabolic panel; Future  -     Protime-INR; Future    7. Colon cancer screening\  Pt has never had colonoscopy, would recommend either inpatient or outpatient pending clinical course  -     Colonoscopy; Future; Expected date: 11/03/2023  -     polyethylene glycol (COLYTE) 4000 mL solution; Take 4,000 mL by mouth once for 1 dose Take 4000 mL by mouth once for 1 dose. Use as directed        ______________________________________________________________________    HPI:  Clary Best is a 66 y.o. male with PMH of COPD, CAD s/p stent placement, CABG, Afib on Eliquis, HLD, HTN, CKD, alcoholic liver disease, prostate cancer, tobacco use who presents for hospital follow up. Reports he had black colored diarrhea and throwing up coffee ground for about 5 days last week, he became lightheaded and weak and went to the hospital. He left AMA because "they didn't know what they were doing." His bowel movements have cleared up somewhat but are brown/black. He was taking Tums for a little while but not anymore. Denies any abdominal pain, HB, nausea/vomiting currently. Denies dysphagia/odynophagia. He takes Ibuprofen on occasion at home. Hasn't been taking Protonix. Reports he stopped drinking 4 months ago, prior to that was drinking a quart of vodka daily. He's trying to get his license back. Reports weakness in his legs prior to UGI bleeding. He gets palpitations with long walks.  Denies any CP. Smokes 1.5 ppd. No IVDU  Denies family history of colon cancer      REVIEW OF SYSTEMS:    CONSTITUTIONAL: Denies any fever, chills, rigors, and weight loss. HEENT: No earache or tinnitus. CARDIOVASCULAR: No chest pain or palpitations. RESPIRATORY: Denies any cough, hemoptysis, shortness of breath or dyspnea on exertion. GASTROINTESTINAL: As noted in the History of Present Illness. GENITOURINARY: Denies any hematuria or dysuria. NEUROLOGIC: No dizziness or vertigo. MUSCULOSKELETAL: Denies any joint swellings. SKIN: Denies skin rashes or itching. ENDOCRINE: Denies excessive thirst. Denies intolerance to heat or cold. PSYCHOSOCIAL: Denies depression or anxiety. Denies any recent memory loss. Historical Information   Past Medical History:   Diagnosis Date    Acute on chronic kidney failure      Alcohol withdrawal ([address]) 06/07/2019    Atrial fibrillation (HCC)     Cancer (HCC)     prostate ca,had radiation    Cardiac disease     stents,then triple bypass    COPD (chronic obstructive pulmonary disease) (HCC)     ETOH abuse     Hx of heart artery stent     2014    Hyperlipidemia     Hypertension     Hypovolemic shock ([address]) 12/22/2019    Lumbar spondylitis ([address]) 10/13/2022    Nasal bone fracture 10/10/2022    Prostate CA ([address])     S/P CABG x 3     2004    Sleep apnea      Past Surgical History:   Procedure Laterality Date    CARDIAC CATHETERIZATION      2 stents    CORONARY ARTERY BYPASS GRAFT  2004    ME ARTHRD ANT INTERBODY MIN [address] CRV BELOW C2 N/A 12/16/2020    Procedure: Anterior cervical discectomy with fusion C4-C7;  Posterior cervical decompression and fusion C2-T2;  Surgeon: Salty Hernandez MD;  Location: [facility];  Service: Neurosurgery    TONSILLECTOMY       Social History   Social History     Substance and Sexual Activity   Alcohol Use Not Currently    Alcohol/week: 4.0 standard drinks of alcohol    Types: 4 Standard drinks or equivalent per week     Social History     Substance and Sexual Activity   Drug Use No     Social History     Tobacco Use   Smoking Status Every Day    Packs/day: 1.50    Years: 40.00    Total pack years: 60.00    Types: Cigarettes   Smokeless Tobacco Never     Family History   Problem Relation Age of Onset    Diabetes Mother     Uterine cancer Mother     COPD Father     Hypertension Father        Meds/Allergies       Current Outpatient Medications:     albuterol (2.5 mg/3 mL) 0.083 % nebulizer solution    albuterol (Ventolin HFA) 90 mcg/act inhaler    apixaban (ELIQUIS) 5 mg    aspirin (ECOTRIN LOW STRENGTH) 81 mg EC tablet    Blood Glucose Monitoring Suppl (ONE TOUCH ULTRA MINI) w/Device KIT    Breo Ellipta 200-25 MCG/ACT inhaler    ferrous sulfate 325 (65 Fe) mg tablet    folic acid (FOLVITE) 1 mg tablet    gabapentin (NEURONTIN) 300 mg capsule    lisinopril (ZESTRIL) 20 mg tablet    magnesium Oxide (MAG-OX) 400 mg TABS    metFORMIN (GLUCOPHAGE) 500 mg tablet    metoprolol tartrate (LOPRESSOR) 25 mg tablet    naltrexone (REVIA) 50 mg tablet    nicotine (NICODERM CQ) 21 mg/24 hr TD 24 hr patch    ONETOUCH DELICA LANCETS FINE MISC    pantoprazole (PROTONIX) 40 mg tablet    polyethylene glycol (COLYTE) 4000 mL solution    pravastatin (PRAVACHOL) 40 mg tablet    ranolazine (RANEXA) 500 mg 12 hr tablet    tamsulosin (FLOMAX) 0.4 mg    Thiamine HCl (vitamin B-1) 100 MG TABS    varenicline (CHANTIX) 1 mg tablet    No Known Allergies        Objective     Blood pressure 104/54, pulse 72, height 6' 2" (1.88 m), weight 94.3 kg (208 lb). Body mass index is 26.71 kg/m². PHYSICAL EXAM:      General Appearance:   Alert, cooperative, no distress   HEENT:   Normocephalic, atraumatic, anicteric. Neck:  Supple, symmetrical, trachea midline   Lungs:   Clear to auscultation bilaterally; no rales, rhonchi or wheezing; respirations unlabored    Heart[de-identified]   Regular rate and rhythm; no murmur.    Abdomen:   Soft, non-tender, non-distended; normal bowel sounds; no masses, no organomegaly    Genitalia:   Deferred    Rectal:   Deferred    Extremities:  No cyanosis, clubbing or edema    Skin:  No jaundice, rashes, or lesions    Lymph nodes:  No palpable cervical lymphadenopathy        Lab Results:   No visits with results within 1 Day(s) from this visit. Latest known visit with results is:   Admission on 10/29/2023, Discharged on 10/30/2023   Component Date Value    WBC 10/29/2023 9.03     RBC 10/29/2023 3.77 (L)     Hemoglobin 10/29/2023 12.8     Hematocrit 10/29/2023 36.4 (L)     MCV 10/29/2023 97     MCH 10/29/2023 34.0     MCHC 10/29/2023 35.2     RDW 10/29/2023 13.2     MPV 10/29/2023 11.0     Platelets 96/66/1380 99 (L)     nRBC 10/29/2023 0     Neutrophils Relative 10/29/2023 70     Immat GRANS % 10/29/2023 0     Lymphocytes Relative 10/29/2023 17     Monocytes Relative 10/29/2023 10     Eosinophils Relative 10/29/2023 3     Basophils Relative 10/29/2023 0     Neutrophils Absolute 10/29/2023 6.27     Immature Grans Absolute 10/29/2023 0.04     Lymphocytes Absolute 10/29/2023 1.49     Monocytes Absolute 10/29/2023 0.91     Eosinophils Absolute 10/29/2023 0.28     Basophils Absolute 10/29/2023 0.04     Sodium 10/29/2023 126 (L)     Potassium 10/29/2023 5.0     Chloride 10/29/2023 91 (L)     CO2 10/29/2023 20 (L)     ANION GAP 10/29/2023 15     BUN 10/29/2023 46 (H)     Creatinine 10/29/2023 2.11 (H)     Glucose 10/29/2023 102     Calcium 10/29/2023 8.7     AST 10/29/2023 51 (H)     ALT 10/29/2023 40     Alkaline Phosphatase 10/29/2023 58     Total Protein 10/29/2023 7.2     Albumin 10/29/2023 4.1     Total Bilirubin 10/29/2023 0.93     eGFR 10/29/2023 32     SARS-CoV-2 10/29/2023 Negative     INFLUENZA A PCR 10/29/2023 Negative     INFLUENZA B PCR 10/29/2023 Negative     RSV PCR 10/29/2023 Negative     Magnesium 10/29/2023 1.4 (L)     Lipase 10/29/2023 77     LACTIC ACID 10/29/2023 1.9     Blood Culture 10/29/2023 No Growth After 4 Days.      Blood Culture 10/29/2023 No Growth After 4 Days.      Color, UA 10/29/2023 Yellow     Clarity, UA 10/29/2023 Clear     Specific Gravity, UA 10/29/2023 <=1.005     pH, UA 10/29/2023 5.5     Leukocytes, UA 10/29/2023 Negative     Nitrite, UA 10/29/2023 Negative     Protein, UA 10/29/2023 Negative     Glucose, UA 10/29/2023 Negative     Ketones, UA 10/29/2023 Trace (A)     Urobilinogen, UA 10/29/2023 0.2     Bilirubin, UA 10/29/2023 Small (A)     Occult Blood, UA 10/29/2023 Negative     Urine Culture 10/29/2023 No Growth <1000 cfu/mL     hs TnI 0hr 10/29/2023 38     Ventricular Rate 10/29/2023 80     Atrial Rate 10/29/2023 80     PA Interval 10/29/2023 134     QRSD Interval 10/29/2023 86     QT Interval 10/29/2023 390     QTC Interval 10/29/2023 449     P Axis 10/29/2023 58     QRS [place] 10/29/2023 72     T Wave [place] 10/29/2023 33     POC Glucose 10/29/2023 115     hs TnI 2hr 10/29/2023 37     Delta 2hr hsTnI 10/29/2023 -1     Ethanol Lvl 10/29/2023 <10     RBC Morphology 10/29/2023 Present     Platelet Estimate 30/76/2444 Decreased (A)     Large Platelet 67/14/2235 Present     Macrocytes 10/29/2023 Present     hs TnI 4hr 10/30/2023 35     Delta 4hr hsTnI 10/30/2023 -3     Sodium 10/30/2023 131 (L)     Potassium 10/30/2023 3.6     Chloride 10/30/2023 98     CO2 10/30/2023 23     ANION GAP 10/30/2023 10     BUN 10/30/2023 35 (H)     Creatinine 10/30/2023 1.29     Glucose 10/30/2023 78     Calcium 10/30/2023 8.1 (L)     AST 10/30/2023 32     ALT 10/30/2023 31     Alkaline Phosphatase 10/30/2023 53     Total Protein 10/30/2023 6.1 (L)     Albumin 10/30/2023 3.5     Total Bilirubin 10/30/2023 0.79     eGFR 10/30/2023 59     Magnesium 10/30/2023 1.7 (L)     Phosphorus 10/30/2023 2.3     WBC 10/30/2023 6.55     RBC 10/30/2023 3.50 (L)     Hemoglobin 10/30/2023 11.9 (L)     Hematocrit 10/30/2023 33.9 (L)     MCV 10/30/2023 97     MCH 10/30/2023 34.0     MCHC 10/30/2023 35.1     RDW 10/30/2023 13.2     MPV 10/30/2023 10.7     Platelets 20/94/0001 80 (L)     nRBC 10/30/2023 0     Neutrophils Relative 10/30/2023 60     Immat GRANS % 10/30/2023 1     Lymphocytes Relative 10/30/2023 22     Monocytes Relative 10/30/2023 12     Eosinophils Relative 10/30/2023 4     Basophils Relative 10/30/2023 1     Neutrophils Absolute 10/30/2023 3.90     Immature Grans Absolute 10/30/2023 0.04     Lymphocytes Absolute 10/30/2023 1.47     Monocytes Absolute 10/30/2023 0.81     Eosinophils Absolute 10/30/2023 0.29     Basophils Absolute 10/30/2023 0.04     POC Glucose 10/30/2023 89     POC Glucose 10/30/2023 112          Radiology Results:   X-ray chest 1 view portable    Result Date: 10/14/2023  Narrative: CHEST INDICATION:   chest pain. COMPARISON: Chest CT 9/20/2023, CXR 6/2/2023. EXAM PERFORMED/VIEWS:  XR CHEST PORTABLE. FINDINGS: Cardiomediastinal silhouette normal. CABG. Lungs clear. No effusion or pneumothorax. Upper abdomen normal. Bones normal for age. Cervicothoracic fusion with cervical disc arthroplasty. Impression: No acute cardiopulmonary disease.  Workstation performed: [id number]

## 2023-11-04 LAB
BACTERIA BLD CULT: NORMAL
BACTERIA BLD CULT: NORMAL

## 2023-11-04 NOTE — ASSESSMENT & PLAN NOTE
Recently admitted for episodes of melena and stool occult positive in the ED on 10/29 but signed out [facility] on 10/30 prior to EGD and further workup. On this admission, Hb 11.2 (from 11.9, 4 days ago). Plt stable 127 (from 99). Hx of alcohol abuse. On Eliquis and chronic aspirin therapy. Seen by GI today 11/3 and agreeable to workup this time around.     Plan:  - Consult to GI  - Continue PPI BID  - Hold ASA and Eliquis  - NPO, sips with meds  - Recheck AM CBC

## 2023-11-04 NOTE — ASSESSMENT & PLAN NOTE
Mg 1.6 on admission. Received 2g IV Mg sulfate in the ED.  Pt on chronic mag-ox 400 mg BID    Plan:  - Continue home meds  - Replete as needed

## 2023-11-04 NOTE — ASSESSMENT & PLAN NOTE
Chronic. Home meds: Folate, Thiamine, Naltrexone    Last drink * ? LFT's wnl.     Plan:  - Continue home Folate, Thiamine  - CIWA?  - Hold Naltrexone

## 2023-11-04 NOTE — ASSESSMENT & PLAN NOTE
Lab Results   Component Value Date    EGFR 61 11/03/2023    EGFR 59 10/30/2023    EGFR 32 10/29/2023    CREATININE 1.26 11/03/2023    CREATININE 1.29 10/30/2023    CREATININE 2.11 (H) 10/29/2023     Stable, at baseline Cr of 1.1-1.3.     Plan:  - I/O's  - Avoid nephrotoxins  - Renally dose medications  - IV Fluids *

## 2023-11-04 NOTE — ASSESSMENT & PLAN NOTE
Chronic, stable    Home meds: Lisinopril 10 mg, Lopressor 25 mg every 12 hrs     Plan:  - Continue Lisinopril and Lopressor with hold parameters  - Monitor V/S

## 2023-11-04 NOTE — ED PROVIDER NOTES
History  Chief Complaint   Patient presents with    Fatigue     C/o generalized weakness. Here recently for low blood pressure and some dark stools. Was to have egd done but pt wanted to leave     62-year-old male presents with low blood pressure and feeling generally weak sent in by his doctor to make sure he does not have persistent hypotension and other abnormalities with blood work. He was recently admitted to the hospital with low blood pressure and dark tarry stools he said he left without getting an EGD. Currently denies any dark tarry stools not on blood thinners. He does drink alcohol. Denies any symptoms of cough congestion fevers chills lightheadedness syncope or any other symptoms      History provided by:  Patient   used: No        Prior to Admission Medications   Prescriptions Last Dose Informant Patient Reported? Taking? Blood Glucose Monitoring Suppl (ONE TOUCH ULTRA MINI) w/Device KIT  Self Yes No   Sig: Use as directed   Breo Ellipta 200-25 MCG/ACT inhaler  Self No No   Sig: Inhale 1 puff daily Rinse mouth after use.    ONETOUCH DELICA LANCETS FINE MISC  Self Yes No   Sig: 3 (three) times a day Test   Thiamine HCl (vitamin B-1) 100 MG TABS  Self No No   Sig: TAKE 1 TABLET DAILY   albuterol (2.5 mg/3 mL) 0.083 % nebulizer solution  Self No No   Sig: Take 3 mL (2.5 mg total) by nebulization every 6 (six) hours as needed for wheezing or shortness of breath   albuterol (Ventolin HFA) 90 mcg/act inhaler  Self No No   Sig: Inhale 2 puffs every 4 (four) hours as needed for wheezing   apixaban (ELIQUIS) 5 mg  Self No No   Sig: Take 1 tablet (5 mg total) by mouth 2 (two) times a day   aspirin (ECOTRIN LOW STRENGTH) 81 mg EC tablet  Self Yes No   Sig: Take 81 mg by mouth daily   ferrous sulfate 325 (65 Fe) mg tablet  Self No No   Sig: Take 1 tablet (325 mg total) by mouth daily with breakfast   folic acid (FOLVITE) 1 mg tablet  Self No No   Sig: TAKE 1 TABLET DAILY   gabapentin (NEURONTIN) 300 mg capsule  Self No No   Sig: TAKE ONE CAPSULE THREE TIMES DAILY   lisinopril (ZESTRIL) 20 mg tablet  Self No No   Sig: Take 0.5 tablets (10 mg total) by mouth daily   magnesium Oxide (MAG-OX) 400 mg TABS  Self No No   Sig: TAKE 1 TABLET TWO TIMES A DAY   metFORMIN (GLUCOPHAGE) 500 mg tablet  Self No No   Sig: Take 1 tablet (500 mg total) by mouth daily with breakfast Do not start before September 23, 2023. metoprolol tartrate (LOPRESSOR) 25 mg tablet  Self No No   Sig: Take 1 tablet (25 mg total) by mouth every 12 (twelve) hours   naltrexone (REVIA) 50 mg tablet  Self No No   Sig: Take 1 tablet (50 mg total) by mouth daily   nicotine (NICODERM CQ) 21 mg/24 hr TD 24 hr patch  Self No No   Sig: Place 1 patch on the skin over 24 hours every 24 hours   pantoprazole (PROTONIX) 40 mg tablet   No No   Sig: Take 1 tablet (40 mg total) by mouth 2 (two) times a day   polyethylene glycol (COLYTE) 4000 mL solution   No No   Sig: Take 4,000 mL by mouth once for 1 dose Take 4000 mL by mouth once for 1 dose.  Use as directed   pravastatin (PRAVACHOL) 40 mg tablet  Self No No   Sig: TAKE 1 TABLET DAILY WITH DINNER   ranolazine (RANEXA) 500 mg 12 hr tablet  Self No No   Sig: TAKE 1 TABLET EVERY 12 HOURS   tamsulosin (FLOMAX) 0.4 mg  Self No No   Sig: TAKE 1 CAPSULE DAILY   varenicline (CHANTIX) 1 mg tablet  Self No No   Sig: TAKE 1 TABLET 2 TIMES A DAY      Facility-Administered Medications: None       Past Medical History:   Diagnosis Date    Acute on chronic kidney failure      Alcohol withdrawal ([address]) 06/07/2019    Atrial fibrillation (HCC)     Cancer (HCC)     prostate ca,had radiation    Cardiac disease     stents,then triple bypass    COPD (chronic obstructive pulmonary disease) ([address])     ETOH abuse     Hx of heart artery stent     2014    Hyperlipidemia     Hypertension     Hypovolemic shock ([address]) 12/22/2019    Lumbar spondylitis ([address]) 10/13/2022    Nasal bone fracture 10/10/2022    Prostate CA ([address])     S/P CABG x 3     2004    Sleep apnea        Past Surgical History:   Procedure Laterality Date    CARDIAC CATHETERIZATION      2 stents    CORONARY ARTERY BYPASS GRAFT  2004    GA ARTHRD ANT INTERBODY MIN [address] CRV BELOW C2 N/A 12/16/2020    Procedure: Anterior cervical discectomy with fusion C4-C7; Posterior cervical decompression and fusion C2-T2;  Surgeon: Samanta Junior MD;  Location: [facility];  Service: Neurosurgery    TONSILLECTOMY         Family History   Problem Relation Age of Onset    Diabetes Mother     Uterine cancer Mother     COPD Father     Hypertension Father      I have reviewed and agree with the history as documented. E-Cigarette/Vaping    E-Cigarette Use Never User      E-Cigarette/Vaping Substances    Nicotine Yes     THC No     CBD No     Flavoring No     Other No     Unknown No      Social History     Tobacco Use    Smoking status: Every Day     Packs/day: 1.50     Years: 40.00     Total pack years: 60.00     Types: Cigarettes    Smokeless tobacco: Never   Vaping Use    Vaping Use: Never used   Substance Use Topics    Alcohol use: Not Currently     Alcohol/week: 4.0 standard drinks of alcohol     Types: 4 Standard drinks or equivalent per week    Drug use: No       Review of Systems   Constitutional: Negative. HENT: Negative. Eyes: Negative. Respiratory: Negative. Cardiovascular: Negative. Gastrointestinal: Negative. Endocrine: Negative. Genitourinary: Negative. Musculoskeletal: Negative. Skin: Negative. Allergic/Immunologic: Negative. Neurological: Negative. Hematological: Negative. Psychiatric/Behavioral: Negative. All other systems reviewed and are negative. Physical Exam  Physical Exam  Vitals and nursing note reviewed. Constitutional:       Appearance: Normal appearance. HENT:      Head: Normocephalic and atraumatic.       Nose: Nose normal.      Mouth/Throat:      Mouth: Mucous membranes are moist.   Eyes:      Extraocular Movements: Extraocular movements intact. Pupils: Pupils are equal, round, and reactive to light. Cardiovascular:      Rate and Rhythm: Normal rate and regular rhythm. Pulmonary:      Effort: Pulmonary effort is normal.      Breath sounds: Normal breath sounds. Abdominal:      General: Abdomen is flat. Bowel sounds are normal.      Palpations: Abdomen is soft. Musculoskeletal:         General: Normal range of motion. Cervical back: Normal range of motion and neck supple. Skin:     General: Skin is warm. Capillary Refill: Capillary refill takes less than 2 seconds. Neurological:      General: No focal deficit present. Mental Status: He is alert and oriented to person, place, and time. Mental status is at baseline. Psychiatric:         Mood and Affect: Mood normal.         Thought Content:  Thought content normal.         Vital Signs  ED Triage Vitals [11/03/23 2023]   Temperature Pulse Respirations Blood Pressure SpO2   (!) 97.3 °F (36.3 °C) 84 (!) 24 115/60 97 %      Temp Source Heart Rate Source Patient Position - Orthostatic VS BP Location FiO2 (%)   Tympanic Monitor Sitting Right arm --      Pain Score       No Pain           Vitals:    11/03/23 2215 11/03/23 2230 11/03/23 2300 11/03/23 2330   BP: 113/58 118/58 131/67 129/63   Pulse: 68 70 68 68   Patient Position - Orthostatic VS:             Visual Acuity      ED Medications  Medications   sodium chloride 0.9 % bolus 1,000 mL (0 mL Intravenous Stopped 11/3/23 2339)   magnesium sulfate 2 g/50 mL IVPB (premix) 2 g (0 g Intravenous Stopped 11/3/23 2339)       Diagnostic Studies  Results Reviewed       Procedure Component Value Units Date/Time    HS Troponin I 2hr [[id number]]  (Normal) Collected: 11/03/23 2320    Lab Status: Final result Specimen: Blood from Hand, Right Updated: 11/03/23 2356     hs TnI 2hr 11 ng/L      Delta 2hr hsTnI 0 ng/L     RBC Morphology Reflex Test [[id number]] Collected: 11/03/23 2119    Lab Status: Final result Specimen: Blood from Arm, Left Updated: 11/03/23 2302    Urine Microscopic [[id number]]  (Abnormal) Collected: 11/03/23 2228    Lab Status: Final result Specimen: Urine, Clean Catch Updated: 11/03/23 2250     RBC, UA 0-1 /hpf      WBC, UA 0-1 /hpf      Epithelial Cells Occasional /hpf      Bacteria, UA Occasional /hpf      MUCUS THREADS Occasional    UA (URINE) with reflex to Scope [[id number]]  (Abnormal) Collected: 11/03/23 2228    Lab Status: Final result Specimen: Urine, Clean Catch Updated: 11/03/23 2239     Color, UA Orange     Clarity, UA Clear     Specific Gravity, UA 1.025     pH, UA 5.5     Leukocytes, UA Negative     Nitrite, UA Negative     Protein, UA Trace mg/dl      Glucose, UA Negative mg/dl      Ketones, UA 15 (1+) mg/dl      Urobilinogen, UA 1.0 E.U./dl      Bilirubin, UA Negative     Occult Blood, UA Negative    CBC and differential [[id number]]  (Abnormal) Collected: 11/03/23 2119    Lab Status: Final result Specimen: Blood from Arm, Left Updated: 11/03/23 2205     WBC 6.17 Thousand/uL      RBC 3.35 Million/uL      Hemoglobin 11.2 g/dL      Hematocrit 33.8 %       fL      MCH 33.4 pg      MCHC 33.1 g/dL      RDW 13.7 %      MPV 10.2 fL      Platelets 218 Thousands/uL     Narrative: This is an appended report. These results have been appended to a previously verified report.     Manual Differential(PHLEBS Do Not Order) [[id number]]  (Abnormal) Collected: 11/03/23 2119    Lab Status: Final result Specimen: Blood from Arm, Left Updated: 11/03/23 2205     Segmented % 68 %      Bands % 1 %      Lymphocytes % 20 %      Monocytes % 8 %      Eosinophils, % 3 %      Basophils % 0 %      Absolute Neutrophils 4.26 Thousand/uL      Lymphocytes Absolute 1.23 Thousand/uL      Monocytes Absolute 0.49 Thousand/uL      Eosinophils Absolute 0.19 Thousand/uL      Basophils Absolute 0.00 Thousand/uL      Total Counted --     RBC Morphology Present     Platelet Estimate Borderline     Basophilic Stippling Present    HS Troponin 0hr (reflex protocol) [[id number]]  (Normal) Collected: 11/03/23 2119    Lab Status: Final result Specimen: Blood from Arm, Left Updated: 11/03/23 2149     hs TnI 0hr 11 ng/L     Comprehensive metabolic panel [[id number]]  (Abnormal) Collected: 11/03/23 2119    Lab Status: Final result Specimen: Blood from Arm, Left Updated: 11/03/23 2140     Sodium 131 mmol/L      Potassium 4.5 mmol/L      Chloride 98 mmol/L      CO2 27 mmol/L      ANION GAP 6 mmol/L      BUN 18 mg/dL      Creatinine 1.26 mg/dL      Glucose 120 mg/dL      Calcium 8.8 mg/dL      AST 25 U/L      ALT 32 U/L      Alkaline Phosphatase 52 U/L      Total Protein 6.5 g/dL      Albumin 3.7 g/dL      Total Bilirubin 0.37 mg/dL      eGFR 61 ml/min/1.73sq m     Narrative:      [facility] guidelines for Chronic Kidney Disease (CKD):     Stage 1 with normal or high GFR (GFR > 90 mL/min/1.73 square meters)    Stage 2 Mild CKD (GFR = 60-89 mL/min/1.73 square meters)    Stage 3A Moderate CKD (GFR = 45-59 mL/min/1.73 square meters)    Stage 3B Moderate CKD (GFR = 30-44 mL/min/1.73 square meters)    Stage 4 Severe CKD (GFR = 15-29 mL/min/1.73 square meters)    Stage 5 End Stage CKD (GFR <15 mL/min/1.73 square meters)  Note: GFR calculation is accurate only with a steady state creatinine    Magnesium [[id number]]  (Abnormal) Collected: 11/03/23 2119    Lab Status: Final result Specimen: Blood from Arm, Left Updated: 11/03/23 2140     Magnesium 1.6 mg/dL                    No orders to display              Procedures  ECG 12 Lead Documentation Only    Date/Time: 11/4/2023 12:46 AM    Performed by: Howard Tovar DO  Authorized by: Howard Tovar DO    ECG reviewed by me, the ED Provider: yes    Patient location:  ED  Previous ECG:     Previous ECG:  Unavailable    Comparison to cardiac monitor: Yes    Interpretation:     Interpretation: non-specific    Rate:     ECG rate assessment: normal    Rhythm:     Rhythm: sinus rhythm Ectopy:     Ectopy: none    QRS:     QRS axis:  Normal  Conduction:     Conduction: normal    ST segments:     ST segments:  Non-specific  T waves:     T waves: non-specific             ED Course                               SBIRT 20yo+      Flowsheet Row Most Recent Value   Initial Alcohol Screen: US AUDIT-C     1. How often do you have a drink containing alcohol? 0 Filed at: 11/03/2023 2135   2. How many drinks containing alcohol do you have on a typical day you are drinking? 0 Filed at: 11/03/2023 2135   3a. Male UNDER 65: How often do you have five or more drinks on one occasion? 0 Filed at: 11/03/2023 2135   Audit-C Score 0 Filed at: 11/03/2023 2135   BRIGIDA: How many times in the past year have you. .. Used an illegal drug or used a prescription medication for non-medical reasons? Never Filed at: 11/03/2023 2135                      Medical Decision Making  Patient evaluated with labs EKG  I reviewed the results and discussed them with the patient. Patient discharged with appropriate instructions and follow-up. Patient verbalized understanding had no further questions at the time of discharge. Patient had stable vital signs and well-appearing at the time of discharge. Problems Addressed:  Hypomagnesemia: acute illness or injury    Amount and/or Complexity of Data Reviewed  External Data Reviewed: notes. Labs: ordered. Decision-making details documented in ED Course. ECG/medicine tests: ordered and independent interpretation performed. Decision-making details documented in ED Course. Risk  Prescription drug management.              Disposition  Final diagnoses:   Hypomagnesemia     Time reflects when diagnosis was documented in both MDM as applicable and the Disposition within this note       Time User Action Codes Description Comment    11/3/2023 11:57 PM Mary Madden Add [E83.42] Hypomagnesemia           ED Disposition       ED Disposition   Discharge    Condition   Stable    Date/Time   Fri Nov 3, 2023 2357    Comment   Fanny Dudley discharge to home/self care.                    Follow-up Information       Follow up With Specialties Details Why Contact Info Additional Information    [address] Emergency Department Emergency Medicine  If symptoms worsen [address] [address]  [facility] Emergency Department, [address], \[facility]", [address]            Discharge Medication List as of 11/3/2023 11:57 PM        CONTINUE these medications which have NOT CHANGED    Details   albuterol (2.5 mg/3 mL) 0.083 % nebulizer solution Take 3 mL (2.5 mg total) by nebulization every 6 (six) hours as needed for wheezing or shortness of breath, Starting Tue 1/17/2023, Normal      albuterol (Ventolin HFA) 90 mcg/act inhaler Inhale 2 puffs every 4 (four) hours as needed for wheezing, Starting Tue 1/17/2023, Normal      apixaban (ELIQUIS) 5 mg Take 1 tablet (5 mg total) by mouth 2 (two) times a day, Starting Fri 9/22/2023, Normal      aspirin (ECOTRIN LOW STRENGTH) 81 mg EC tablet Take 81 mg by mouth daily, Historical Med      Blood Glucose Monitoring Suppl (ONE TOUCH ULTRA MINI) w/Device KIT Use as directed, Starting Thu 5/16/2019, Historical Med      Breo Ellipta 200-25 MCG/ACT inhaler Inhale 1 puff daily Rinse mouth after use., Starting Fri 6/9/2023, Normal      ferrous sulfate 325 (65 Fe) mg tablet Take 1 tablet (325 mg total) by mouth daily with breakfast, Starting Fri 6/9/5616, Normal      folic acid (FOLVITE) 1 mg tablet TAKE 1 TABLET DAILY, Normal      gabapentin (NEURONTIN) 300 mg capsule TAKE ONE CAPSULE THREE TIMES DAILY, Normal      lisinopril (ZESTRIL) 20 mg tablet Take 0.5 tablets (10 mg total) by mouth daily, Starting Thu 10/5/2023, Normal      magnesium Oxide (MAG-OX) 400 mg TABS TAKE 1 TABLET TWO TIMES A DAY, Normal      metFORMIN (GLUCOPHAGE) 500 mg tablet Take 1 tablet (500 mg total) by mouth daily with breakfast Do not start before September 23, 2023., Starting Sat 9/23/2023, No Print      metoprolol tartrate (LOPRESSOR) 25 mg tablet Take 1 tablet (25 mg total) by mouth every 12 (twelve) hours, Starting Thu 10/5/2023, Normal      naltrexone (REVIA) 50 mg tablet Take 1 tablet (50 mg total) by mouth daily, Starting Fri 11/3/2023, Normal      nicotine (NICODERM CQ) 21 mg/24 hr TD 24 hr patch Place 1 patch on the skin over 24 hours every 24 hours, Starting Thu 9/28/2023, Normal      ONETOUCH DELICA LANCETS FINE MISC 3 (three) times a day Test, Starting Thu 5/16/2019, Historical Med      pantoprazole (PROTONIX) 40 mg tablet Take 1 tablet (40 mg total) by mouth 2 (two) times a day, Starting Fri 11/3/2023, Normal      polyethylene glycol (COLYTE) 4000 mL solution Take 4,000 mL by mouth once for 1 dose Take 4000 mL by mouth once for 1 dose. Use as directed, Starting Fri 11/3/2023, Normal      pravastatin (PRAVACHOL) 40 mg tablet TAKE 1 TABLET DAILY WITH DINNER, Normal      ranolazine (RANEXA) 500 mg 12 hr tablet TAKE 1 TABLET EVERY 12 HOURS, Normal      tamsulosin (FLOMAX) 0.4 mg TAKE 1 CAPSULE DAILY, Normal      Thiamine HCl (vitamin B-1) 100 MG TABS TAKE 1 TABLET DAILY, Normal      varenicline (CHANTIX) 1 mg tablet TAKE 1 TABLET 2 TIMES A DAY, Starting Wed 9/6/2023, Normal             No discharge procedures on file. PDMP Review         Value Time User    PDMP Reviewed  Yes 7/26/2021  4:50 PM Marquis Weinstein.  MD Eduardo            ED Provider  Electronically Signed by             Rennis Mortimer, DO  11/04/23 0594

## 2023-11-04 NOTE — ASSESSMENT & PLAN NOTE
Chronic. Home meds: Breo Ellipta 200-25 mcg inhaler, Albuterol PRN. Patient admits only intermittent compliance.      Plan:  - Continue home inhalers  -

## 2023-11-04 NOTE — ASSESSMENT & PLAN NOTE
Chronic, improving. Plt 127, improved from 99 on prior admission. Hx of alcohol abuse on chronic aspirin therapy, reported melena.     Plan:  - NPO  - GI consulted  - Trend CBC

## 2023-11-04 NOTE — ASSESSMENT & PLAN NOTE
Acute on chronic, likely in setting of chronic alcohol use and poor PO intake. Na 131 on admission.     Received 1L IV fluid bolus NS in the ED    Plan:  - Continue IV NS _ while NPO  - Recheck AM BP  - Consider urine/serum osm, urine Na if poor response to fluid resuscitation

## 2023-11-04 NOTE — ASSESSMENT & PLAN NOTE
Chronic, rate controlled  Home meds: Eliquis 5 mg BID, Lopressor 25 mg every 12 hrs, Ranexa 500 mg every 12 hrs  EKG on admission showed *     Plan:  - HOLD Eliquis  - Continue Ranexa and Lopressor

## 2023-11-04 NOTE — ASSESSMENT & PLAN NOTE
Chronic  Home meds: Aspirin 81 mg, Eliquis 5 mg BID, Pravastatin 40 mg     Plan:  - Hold ASA and Eliquis  - Continue pravastatin  - Trend CBC

## 2023-11-04 NOTE — ASSESSMENT & PLAN NOTE
Wt Readings from Last 3 Encounters:   11/03/23 94.3 kg (208 lb)   10/30/23 92.5 kg (204 lb)   09/28/23 98.7 kg (217 lb 9.6 oz)     Last echo on 9/22/23 showed EF 50%, normal systolic function, V6HU (pseudonormal relaxation)  Home meds: Lopressor 25 mg every 12 hrs, Ranexa 500 mg every 12 hrs    Plan:  - Continue Ranexa and Lopressor   - Strict I/O's  - Gentle fluid hydration * and monitor for signs of fluid overload  - Daily weights

## 2023-11-04 NOTE — ASSESSMENT & PLAN NOTE
Lab Results   Component Value Date    HGBA1C 6.1 (H) 09/20/2023     Chronic, stable.  Home meds include Metformin    Plan:  - Hold Metformin  - Currently NPO  - START insulin sliding scale  - Accuchecks q6H while NPO  - Carb controlled diet

## 2023-11-06 ENCOUNTER — VBI (OUTPATIENT)
Dept: FAMILY MEDICINE CLINIC | Facility: CLINIC | Age: 63
End: 2023-11-06

## 2023-11-06 LAB
ATRIAL RATE: 73 BPM
P AXIS: 71 DEGREES
PR INTERVAL: 120 MS
QRS AXIS: 70 DEGREES
QRSD INTERVAL: 88 MS
QT INTERVAL: 376 MS
QTC INTERVAL: 414 MS
T WAVE AXIS: 65 DEGREES
VENTRICULAR RATE: 73 BPM

## 2023-11-06 PROCEDURE — 93010 ELECTROCARDIOGRAM REPORT: CPT | Performed by: INTERNAL MEDICINE

## 2023-11-06 NOTE — TELEPHONE ENCOUNTER
11/06/23 12:45 PM    Patient contacted post ED visit, outreach attempt made but message could not be left. Additional outreach attempt will be made. Thank you. Zackery [facility]    11/07/23 11:42 AM    Patient contacted post ED visit, outreach attempt made but message could not be left. Additional outreach attempt will be made. Thank you. Zacekry [facility]    11/08/23 9:55 AM    Patient contacted post ED visit, outreach attempt made but message could not be left. Additional outreach attempt will be made. Thank you. Zackery [facility]    11/08/23 9:55 AM    Patient contacted post ED visit, phone outreaches were unsuccessful; patient does not have MyChart, a [facility] letter has not been sent. Thank you.   Zackery [facility]

## 2023-11-07 ENCOUNTER — TELEPHONE (OUTPATIENT)
Dept: GASTROENTEROLOGY | Facility: CLINIC | Age: 63
End: 2023-11-07

## 2023-11-07 ENCOUNTER — PATIENT OUTREACH (OUTPATIENT)
Dept: FAMILY MEDICINE CLINIC | Facility: CLINIC | Age: 63
End: 2023-11-07

## 2023-11-07 NOTE — LETTER
Date: 11/07/23    Dear Angel Vo,     My name is Jerry Deutsch. I am an RN Case Manager working with [facility]. If you need to reach me about any questions or concerns with managing your care, please do not hesitate to call me.      Shanna Melendez RN Case Manager  [phone]

## 2023-11-07 NOTE — PROGRESS NOTES
Pt due for outreach. Second attempt to reach patient. LVM requesting return call. Unable to reach letter mailed. Complex episode closed. Will reopen if patient reaches out or new referral is received.

## 2023-11-07 NOTE — TELEPHONE ENCOUNTER
----- Message from [address] sent at 11/7/2023 10:31 AM EST -----  Regarding: schedule egd/colonoscopy  Please schedule EGD/Colonoscopy with [facility] and [facility]. Can be with any physician. I placed the orders during his office visit last Friday. He is on Eliquis so we will have to obtain clearance.  Thank you

## 2023-11-08 NOTE — TELEPHONE ENCOUNTER
I lmom for pt to please call back to schedule an EGD/Colonoscopy. Will call pt again in one week if do not hear back from him.

## 2023-11-15 NOTE — TELEPHONE ENCOUNTER
Patient can undergo colonoscopy  There is no cardiac contraindication.   Eliquis may be held for 48 hours prior to the procedure and then resume once okay by GI

## 2023-11-15 NOTE — TELEPHONE ENCOUNTER
Scheduled date of EGD/colonoscopy (as of today): 11/28/23  Physician performing EGD/colonoscopy: Dr Amezquita Board  Location of EGD/colonoscopy: [facility]  Desired bowel prep reviewed with patient: miralax w/ dul mailed   Instructions reviewed with patient by: cher  Clearances:  pt needs Eliquis clearance from [address], [place]

## 2023-11-15 NOTE — TELEPHONE ENCOUNTER
CHELSEA Machado~    Our mutual patient is scheduled for procedure:   Colonoscopy and EGD at [facility]. Please advise if pt is cleared to hold his Eliquis and for how long. Thank you so much for your help! On: _11___/_ 29   _/_ 21   _      With: Dr. Ochoa________    He/She is taking the following blood thinner:   Eliquis       Can this be stopped _2_____ days prior to the procedure?       Physician Approving clearance: ________________________

## 2023-11-17 ENCOUNTER — APPOINTMENT (EMERGENCY)
Dept: RADIOLOGY | Facility: HOSPITAL | Age: 63
DRG: 897 | End: 2023-11-17
Payer: COMMERCIAL

## 2023-11-17 ENCOUNTER — HOSPITAL ENCOUNTER (INPATIENT)
Facility: HOSPITAL | Age: 63
LOS: 3 days | Discharge: HOME | DRG: 897 | End: 2023-11-21
Attending: EMERGENCY MEDICINE | Admitting: FAMILY MEDICINE
Payer: COMMERCIAL

## 2023-11-17 DIAGNOSIS — F10.929 ALCOHOL INTOXICATION (HCC): ICD-10-CM

## 2023-11-17 DIAGNOSIS — F10.10 ALCOHOL ABUSE: Primary | Chronic | ICD-10-CM

## 2023-11-17 DIAGNOSIS — E83.42 HYPOMAGNESEMIA: ICD-10-CM

## 2023-11-17 DIAGNOSIS — E87.1 HYPONATREMIA: ICD-10-CM

## 2023-11-17 DIAGNOSIS — S22.39XA RIB FRACTURE: ICD-10-CM
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
## 2023-11-17 LAB
ALBUMIN SERPL BCP-MCNC: 4.2 G/DL (ref 3.5–5)
ALP SERPL-CCNC: 74 U/L (ref 34–104)
ALT SERPL W P-5'-P-CCNC: 70 U/L (ref 7–52)
ANION GAP SERPL CALCULATED.3IONS-SCNC: 13 MMOL/L
ANION GAP SERPL CALCULATED.3IONS-SCNC: 13 MMOL/L
AST SERPL W P-5'-P-CCNC: 86 U/L (ref 13–39)
BASO STIPL BLD QL SMEAR: PRESENT
BASOPHILS # BLD AUTO: 0.1 THOUSANDS/ÂΜL (ref 0–0.1)
BASOPHILS NFR BLD AUTO: 1 % (ref 0–1)
BILIRUB SERPL-MCNC: 0.88 MG/DL (ref 0.2–1)
BUN SERPL-MCNC: 19 MG/DL (ref 5–25)
BUN SERPL-MCNC: 19 MG/DL (ref 5–25)
CALCIUM SERPL-MCNC: 8.3 MG/DL (ref 8.4–10.2)
CALCIUM SERPL-MCNC: 8.7 MG/DL (ref 8.4–10.2)
CHLORIDE SERPL-SCNC: 88 MMOL/L (ref 96–108)
CHLORIDE SERPL-SCNC: 92 MMOL/L (ref 96–108)
CO2 SERPL-SCNC: 24 MMOL/L (ref 21–32)
CO2 SERPL-SCNC: 24 MMOL/L (ref 21–32)
CREAT SERPL-MCNC: 1.24 MG/DL (ref 0.6–1.3)
CREAT SERPL-MCNC: 1.29 MG/DL (ref 0.6–1.3)
EOSINOPHIL # BLD AUTO: 0.03 THOUSAND/ÂΜL (ref 0–0.61)
EOSINOPHIL NFR BLD AUTO: 0 % (ref 0–6)
ERYTHROCYTE [DISTWIDTH] IN BLOOD BY AUTOMATED COUNT: 13.9 % (ref 11.6–15.1)
ETHANOL SERPL-MCNC: 409 MG/DL
GFR SERPL CREATININE-BSD FRML MDRD: 59 ML/MIN/1.73SQ M
GFR SERPL CREATININE-BSD FRML MDRD: 62 ML/MIN/1.73SQ M
GLUCOSE SERPL-MCNC: 100 MG/DL (ref 65–140)
GLUCOSE SERPL-MCNC: 103 MG/DL (ref 65–140)
GLUCOSE SERPL-MCNC: 95 MG/DL (ref 65–140)
GLUCOSE SERPL-MCNC: 97 MG/DL (ref 65–140)
HCT VFR BLD AUTO: 38.2 % (ref 36.5–49.3)
HGB BLD-MCNC: 13.6 G/DL (ref 12–17)
IMM GRANULOCYTES # BLD AUTO: 0.03 THOUSAND/UL (ref 0–0.2)
IMM GRANULOCYTES NFR BLD AUTO: 0 % (ref 0–2)
LYMPHOCYTES # BLD AUTO: 1.17 THOUSANDS/ÂΜL (ref 0.6–4.47)
LYMPHOCYTES NFR BLD AUTO: 16 % (ref 14–44)
MCH RBC QN AUTO: 33.9 PG (ref 26.8–34.3)
MCHC RBC AUTO-ENTMCNC: 35.6 G/DL (ref 31.4–37.4)
MCV RBC AUTO: 95 FL (ref 82–98)
MONOCYTES # BLD AUTO: 0.66 THOUSAND/ÂΜL (ref 0.17–1.22)
MONOCYTES NFR BLD AUTO: 9 % (ref 4–12)
NEUTROPHILS # BLD AUTO: 5.39 THOUSANDS/ÂΜL (ref 1.85–7.62)
NEUTS SEG NFR BLD AUTO: 74 % (ref 43–75)
NRBC BLD AUTO-RTO: 0 /100 WBCS
PLATELET # BLD AUTO: 92 THOUSANDS/UL (ref 149–390)
PLATELET BLD QL SMEAR: ABNORMAL
PMV BLD AUTO: 10.8 FL (ref 8.9–12.7)
POTASSIUM SERPL-SCNC: 3.9 MMOL/L (ref 3.5–5.3)
POTASSIUM SERPL-SCNC: 4.3 MMOL/L (ref 3.5–5.3)
PROT SERPL-MCNC: 7.3 G/DL (ref 6.4–8.4)
RBC # BLD AUTO: 4.01 MILLION/UL (ref 3.88–5.62)
RBC MORPH BLD: PRESENT
SODIUM SERPL-SCNC: 125 MMOL/L (ref 135–147)
SODIUM SERPL-SCNC: 129 MMOL/L (ref 135–147)
WBC # BLD AUTO: 7.38 THOUSAND/UL (ref 4.31–10.16)

## 2023-11-17 PROCEDURE — 36415 COLL VENOUS BLD VENIPUNCTURE: CPT | Performed by: EMERGENCY MEDICINE

## 2023-11-17 PROCEDURE — 99285 EMERGENCY DEPT VISIT HI MDM: CPT | Performed by: EMERGENCY MEDICINE

## 2023-11-17 PROCEDURE — 72125 CT NECK SPINE W/O DYE: CPT

## 2023-11-17 PROCEDURE — 82077 ASSAY SPEC XCP UR&BREATH IA: CPT | Performed by: EMERGENCY MEDICINE

## 2023-11-17 PROCEDURE — 99223 1ST HOSP IP/OBS HIGH 75: CPT | Performed by: FAMILY MEDICINE

## 2023-11-17 PROCEDURE — 99284 EMERGENCY DEPT VISIT MOD MDM: CPT

## 2023-11-17 PROCEDURE — 74177 CT ABD & PELVIS W/CONTRAST: CPT

## 2023-11-17 PROCEDURE — 85025 COMPLETE CBC W/AUTO DIFF WBC: CPT | Performed by: EMERGENCY MEDICINE

## 2023-11-17 PROCEDURE — 71260 CT THORAX DX C+: CPT

## 2023-11-17 PROCEDURE — 93005 ELECTROCARDIOGRAM TRACING: CPT

## 2023-11-17 PROCEDURE — 96374 THER/PROPH/DIAG INJ IV PUSH: CPT

## 2023-11-17 PROCEDURE — 70450 CT HEAD/BRAIN W/O DYE: CPT

## 2023-11-17 PROCEDURE — 80048 BASIC METABOLIC PNL TOTAL CA: CPT

## 2023-11-17 PROCEDURE — 82948 REAGENT STRIP/BLOOD GLUCOSE: CPT

## 2023-11-17 PROCEDURE — 96361 HYDRATE IV INFUSION ADD-ON: CPT

## 2023-11-17 PROCEDURE — 80053 COMPREHEN METABOLIC PANEL: CPT | Performed by: EMERGENCY MEDICINE

## 2023-11-17 RX ORDER — NICOTINE 21 MG/24HR
1 PATCH, TRANSDERMAL 24 HOURS TRANSDERMAL DAILY
Status: DISCONTINUED | OUTPATIENT
Start: 2023-11-18 | End: 2023-11-21 | Stop reason: HOSPADM

## 2023-11-17 RX ORDER — TAMSULOSIN HYDROCHLORIDE 0.4 MG/1
0.4 CAPSULE ORAL DAILY
Status: DISCONTINUED | OUTPATIENT
Start: 2023-11-18 | End: 2023-11-21 | Stop reason: HOSPADM

## 2023-11-17 RX ORDER — INSULIN LISPRO 100 [IU]/ML
1-5 INJECTION, SOLUTION INTRAVENOUS; SUBCUTANEOUS
Status: DISCONTINUED | OUTPATIENT
Start: 2023-11-17 | End: 2023-11-21 | Stop reason: HOSPADM

## 2023-11-17 RX ORDER — PANTOPRAZOLE SODIUM 40 MG/10ML
40 INJECTION, POWDER, LYOPHILIZED, FOR SOLUTION INTRAVENOUS
Status: DISCONTINUED | OUTPATIENT
Start: 2023-11-18 | End: 2023-11-20

## 2023-11-17 RX ORDER — ALBUTEROL SULFATE 90 UG/1
2 AEROSOL, METERED RESPIRATORY (INHALATION) EVERY 4 HOURS PRN
Status: DISCONTINUED | OUTPATIENT
Start: 2023-11-17 | End: 2023-11-21 | Stop reason: HOSPADM

## 2023-11-17 RX ORDER — ALBUTEROL SULFATE 2.5 MG/3ML
2.5 SOLUTION RESPIRATORY (INHALATION) EVERY 6 HOURS PRN
Status: DISCONTINUED | OUTPATIENT
Start: 2023-11-17 | End: 2023-11-21 | Stop reason: HOSPADM

## 2023-11-17 RX ORDER — ONDANSETRON 2 MG/ML
4 INJECTION INTRAMUSCULAR; INTRAVENOUS ONCE
Status: COMPLETED | OUTPATIENT
Start: 2023-11-17 | End: 2023-11-17

## 2023-11-17 RX ORDER — LIDOCAINE 50 MG/G
1 PATCH TOPICAL DAILY
Status: DISCONTINUED | OUTPATIENT
Start: 2023-11-18 | End: 2023-11-21 | Stop reason: HOSPADM

## 2023-11-17 RX ORDER — SODIUM CHLORIDE 9 MG/ML
50 INJECTION, SOLUTION INTRAVENOUS CONTINUOUS
Status: DISCONTINUED | OUTPATIENT
Start: 2023-11-17 | End: 2023-11-17

## 2023-11-17 RX ORDER — LISINOPRIL 10 MG/1
10 TABLET ORAL DAILY
Status: DISCONTINUED | OUTPATIENT
Start: 2023-11-18 | End: 2023-11-17

## 2023-11-17 RX ORDER — LIDOCAINE 50 MG/G
1 PATCH TOPICAL ONCE
Status: COMPLETED | OUTPATIENT
Start: 2023-11-17 | End: 2023-11-18

## 2023-11-17 RX ORDER — ASPIRIN 81 MG/1
81 TABLET, CHEWABLE ORAL DAILY
Status: DISCONTINUED | OUTPATIENT
Start: 2023-11-18 | End: 2023-11-17

## 2023-11-17 RX ORDER — PRAVASTATIN SODIUM 40 MG
40 TABLET ORAL
Status: DISCONTINUED | OUTPATIENT
Start: 2023-11-17 | End: 2023-11-21 | Stop reason: HOSPADM

## 2023-11-17 RX ORDER — ACETAMINOPHEN 325 MG/1
650 TABLET ORAL EVERY 6 HOURS SCHEDULED
Status: DISCONTINUED | OUTPATIENT
Start: 2023-11-17 | End: 2023-11-18

## 2023-11-17 RX ORDER — SODIUM CHLORIDE 9 MG/ML
50 INJECTION, SOLUTION INTRAVENOUS CONTINUOUS
Status: DISCONTINUED | OUTPATIENT
Start: 2023-11-17 | End: 2023-11-18

## 2023-11-17 RX ORDER — GABAPENTIN 300 MG/1
300 CAPSULE ORAL 3 TIMES DAILY
Status: DISCONTINUED | OUTPATIENT
Start: 2023-11-17 | End: 2023-11-21 | Stop reason: HOSPADM

## 2023-11-17 RX ORDER — METHOCARBAMOL 500 MG/1
500 TABLET, FILM COATED ORAL EVERY 6 HOURS PRN
Status: DISCONTINUED | OUTPATIENT
Start: 2023-11-17 | End: 2023-11-18

## 2023-11-17 RX ORDER — FLUTICASONE FUROATE AND VILANTEROL 200; 25 UG/1; UG/1
1 POWDER RESPIRATORY (INHALATION)
Status: DISCONTINUED | OUTPATIENT
Start: 2023-11-18 | End: 2023-11-21 | Stop reason: HOSPADM

## 2023-11-17 RX ORDER — FERROUS SULFATE 325(65) MG
325 TABLET ORAL
Status: DISCONTINUED | OUTPATIENT
Start: 2023-11-18 | End: 2023-11-21 | Stop reason: HOSPADM

## 2023-11-17 RX ORDER — VARENICLINE TARTRATE 1 MG/1
1 TABLET, FILM COATED ORAL 2 TIMES DAILY
Status: DISCONTINUED | OUTPATIENT
Start: 2023-11-17 | End: 2023-11-21 | Stop reason: HOSPADM

## 2023-11-17 RX ORDER — FOLIC ACID 1 MG/1
1 TABLET ORAL DAILY
Status: DISCONTINUED | OUTPATIENT
Start: 2023-11-18 | End: 2023-11-21 | Stop reason: HOSPADM

## 2023-11-17 RX ORDER — RANOLAZINE 500 MG/1
500 TABLET, EXTENDED RELEASE ORAL EVERY 12 HOURS
Status: DISCONTINUED | OUTPATIENT
Start: 2023-11-17 | End: 2023-11-21 | Stop reason: HOSPADM

## 2023-11-17 RX ORDER — LANOLIN ALCOHOL/MO/W.PET/CERES
100 CREAM (GRAM) TOPICAL DAILY
Status: DISCONTINUED | OUTPATIENT
Start: 2023-11-18 | End: 2023-11-21 | Stop reason: HOSPADM

## 2023-11-17 RX ADMIN — RANOLAZINE 500 MG: 500 TABLET, FILM COATED, EXTENDED RELEASE ORAL at 18:37

## 2023-11-17 RX ADMIN — ONDANSETRON 4 MG: 2 INJECTION INTRAMUSCULAR; INTRAVENOUS at 13:26

## 2023-11-17 RX ADMIN — METOPROLOL TARTRATE 25 MG: 25 TABLET, FILM COATED ORAL at 21:36

## 2023-11-17 RX ADMIN — GABAPENTIN 300 MG: 300 CAPSULE ORAL at 21:36

## 2023-11-17 RX ADMIN — PRAVASTATIN SODIUM 40 MG: 40 TABLET ORAL at 18:37

## 2023-11-17 RX ADMIN — IOHEXOL 100 ML: 350 INJECTION, SOLUTION INTRAVENOUS at 14:13

## 2023-11-17 RX ADMIN — SODIUM CHLORIDE 1000 ML: 0.9 INJECTION, SOLUTION INTRAVENOUS at 13:26

## 2023-11-17 RX ADMIN — SODIUM CHLORIDE 50 ML/HR: 0.9 INJECTION, SOLUTION INTRAVENOUS at 17:30

## 2023-11-17 RX ADMIN — ACETAMINOPHEN 650 MG: 325 TABLET ORAL at 18:35

## 2023-11-17 RX ADMIN — SODIUM CHLORIDE 50 ML/HR: 0.9 INJECTION, SOLUTION INTRAVENOUS at 22:22

## 2023-11-17 RX ADMIN — LIDOCAINE 5% 1 PATCH: 700 PATCH TOPICAL at 13:35

## 2023-11-17 NOTE — ASSESSMENT & PLAN NOTE
Patient has hx of upper GI bleed and reported dark stools in the past few weeks prior to admission. No active bleeding. Hemoglobin stable.   Protonix 40mg IV

## 2023-11-17 NOTE — ED PROVIDER NOTES
History  Chief Complaint   Patient presents with    Back Pain     Fall 2 days ago, c/o lower back pain. Ems also states ETOH     HPI  Patient is a 62-year-old male history of alcohol abuse, COPD, atrial fibrillation anticoagulated on Eliquis presenting for evaluation of flank pain, rib pain after a fall. Patient states that 3 days ago while intoxicated he fell. Patient does not remember the exact circumstances. Patient is not sure if he struck his head. Patient denies headache, neck pain, does complain of right-sided flank pain and right rib pain. Patient states some relatively minor associated shortness of breath. Patient continues to drink today, visibly intoxicated, denies additional medical complaint at this time, not desiring alcohol rehabilitation. Prior to Admission Medications   Prescriptions Last Dose Informant Patient Reported? Taking? Blood Glucose Monitoring Suppl (ONE TOUCH ULTRA MINI) w/Device KIT Past Week Self Yes Yes   Sig: Use as directed   Breo Ellipta 200-25 MCG/ACT inhaler Past Week Self No Yes   Sig: Inhale 1 puff daily Rinse mouth after use.    Davina Hernandez LANCETS FINE MISC Past Week Self Yes Yes   Sig: 3 (three) times a day Test   Thiamine HCl (vitamin B-1) 100 MG TABS Past Week Self No Yes   Sig: TAKE 1 TABLET DAILY   albuterol (2.5 mg/3 mL) 0.083 % nebulizer solution Past Week Self No Yes   Sig: Take 3 mL (2.5 mg total) by nebulization every 6 (six) hours as needed for wheezing or shortness of breath   albuterol (Ventolin HFA) 90 mcg/act inhaler Past Week Self No Yes   Sig: Inhale 2 puffs every 4 (four) hours as needed for wheezing   apixaban (ELIQUIS) 5 mg Past Week Self No Yes   Sig: Take 1 tablet (5 mg total) by mouth 2 (two) times a day   aspirin (ECOTRIN LOW STRENGTH) 81 mg EC tablet Past Week Self Yes Yes   Sig: Take 81 mg by mouth daily   ferrous sulfate 325 (65 Fe) mg tablet Past Week Self No Yes   Sig: Take 1 tablet (325 mg total) by mouth daily with breakfast   folic acid (FOLVITE) 1 mg tablet Past Week Self No Yes   Sig: TAKE 1 TABLET DAILY   gabapentin (NEURONTIN) 300 mg capsule Past Week Self No Yes   Sig: TAKE ONE CAPSULE THREE TIMES DAILY   lisinopril (ZESTRIL) 20 mg tablet Past Week Self No Yes   Sig: Take 0.5 tablets (10 mg total) by mouth daily   magnesium Oxide (MAG-OX) 400 mg TABS Past Week Self No Yes   Sig: TAKE 1 TABLET TWO TIMES A DAY   metFORMIN (GLUCOPHAGE) 500 mg tablet Past Week Self No Yes   Sig: Take 1 tablet (500 mg total) by mouth daily with breakfast Do not start before September 23, 2023. metoprolol tartrate (LOPRESSOR) 25 mg tablet Past Week Self No Yes   Sig: Take 1 tablet (25 mg total) by mouth every 12 (twelve) hours   naltrexone (REVIA) 50 mg tablet Past Week Self No Yes   Sig: Take 1 tablet (50 mg total) by mouth daily   nicotine (NICODERM CQ) 21 mg/24 hr TD 24 hr patch Past Week Self No Yes   Sig: Place 1 patch on the skin over 24 hours every 24 hours   pantoprazole (PROTONIX) 40 mg tablet Past Week  No Yes   Sig: Take 1 tablet (40 mg total) by mouth 2 (two) times a day   polyethylene glycol (COLYTE) 4000 mL solution   No No   Sig: Take 4,000 mL by mouth once for 1 dose Take 4000 mL by mouth once for 1 dose.  Use as directed   pravastatin (PRAVACHOL) 40 mg tablet Past Week Self No Yes   Sig: TAKE 1 TABLET DAILY WITH DINNER   ranolazine (RANEXA) 500 mg 12 hr tablet Past Week Self No Yes   Sig: TAKE 1 TABLET EVERY 12 HOURS   tamsulosin (FLOMAX) 0.4 mg Past Week Self No Yes   Sig: TAKE 1 CAPSULE DAILY   varenicline (CHANTIX) 1 mg tablet Past Week Self No Yes   Sig: TAKE 1 TABLET 2 TIMES A DAY      Facility-Administered Medications: None       Past Medical History:   Diagnosis Date    Acute on chronic kidney failure      Alcohol withdrawal (HCC) 06/07/2019    Atrial fibrillation (HCC)     Cancer (HCC)     prostate ca,had radiation    Cardiac disease     stents,then triple bypass    COPD (chronic obstructive pulmonary disease) (HCC)     ETOH abuse Hx of heart artery stent     2014    Hyperlipidemia     Hypertension     Hypovolemic shock ([address]) 12/22/2019    Lumbar spondylitis ([address]) 10/13/2022    Nasal bone fracture 10/10/2022    Prostate CA ([address])     S/P CABG x 3     2004    Sleep apnea        Past Surgical History:   Procedure Laterality Date    CARDIAC CATHETERIZATION      2 stents    CORONARY ARTERY BYPASS GRAFT  2004    [place] ARTHRD ANT INTERBODY MIN [address] CRV BELOW C2 N/A 12/16/2020    Procedure: Anterior cervical discectomy with fusion C4-C7; Posterior cervical decompression and fusion C2-T2;  Surgeon: Michele Estevez MD;  Location: [facility];  Service: Neurosurgery    TONSILLECTOMY         Family History   Problem Relation Age of Onset    Diabetes Mother     Uterine cancer Mother     COPD Father     Hypertension Father      I have reviewed and agree with the history as documented. E-Cigarette/Vaping    E-Cigarette Use Never User      E-Cigarette/Vaping Substances    Nicotine Yes     THC No     CBD No     Flavoring No     Other No     Unknown No      Social History     Tobacco Use    Smoking status: Every Day     Packs/day: 1.50     Years: 40.00     Total pack years: 60.00     Types: Cigarettes    Smokeless tobacco: Never   Vaping Use    Vaping Use: Never used   Substance Use Topics    Alcohol use: Not Currently     Alcohol/week: 4.0 standard drinks of alcohol     Types: 4 Standard drinks or equivalent per week    Drug use: No       Review of Systems   Constitutional:  Negative for chills, fatigue and fever. Respiratory:  Negative for cough and shortness of breath. Gastrointestinal:  Negative for diarrhea, nausea and vomiting. Genitourinary:  Positive for flank pain. Musculoskeletal:  Negative for arthralgias, myalgias, neck pain and neck stiffness. Neurological:  Negative for weakness, numbness and headaches. Psychiatric/Behavioral:  Negative for confusion. All other systems reviewed and are negative.       Physical Exam  Physical Exam  Vitals and nursing note reviewed. Constitutional:       General: He is not in acute distress. Appearance: Normal appearance. He is not ill-appearing, toxic-appearing or diaphoretic. Comments: Chronically ill-appearing but nontoxic nondistressed   HENT:      Head: Normocephalic and atraumatic. Comments: Moist mucous membranes. No external signs of trauma. Pupils 3 mm bilaterally, reactive to light. Normal EOM. Right Ear: External ear normal.      Left Ear: External ear normal.   Eyes:      General:         Right eye: No discharge. Left eye: No discharge. Cardiovascular:      Comments: Sinus tachycardia rate of 105. No murmurs rubs or gallops. Extremities warm and well-perfused without mottling. Pulmonary:      Effort: No respiratory distress. Comments: No increased work of breathing. Speaking in complete sentences. Lungs clear to auscultation bilaterally without wheezes, rales, rhonchi. Satting 95% on room air indicating adequate oxygenation. Abdominal:      General: There is no distension. Comments: Abdomen soft, nontender, nondistended without rigidity, rebound, guarding   Genitourinary:     Comments: Right-sided flank ecchymosis. Right posterior rib tenderness. No visible or palpable hematoma. Musculoskeletal:         General: No deformity. Cervical back: Normal range of motion. Comments: No C/T/L-spine tenderness, step-off, deformity   Skin:     Findings: No lesion or rash. Neurological:      Mental Status: He is alert and oriented to person, place, and time. Mental status is at baseline. Comments: Intoxicated but fully oriented. Moving all extremities equally.    Psychiatric:         Mood and Affect: Mood and affect normal.         Vital Signs  ED Triage Vitals   Temperature Pulse Respirations Blood Pressure SpO2   11/17/23 1243 11/17/23 1243 11/17/23 1243 11/17/23 1243 11/17/23 1243   98.3 °F (36.8 °C) 90 22 133/64 92 %      Temp Source Heart Rate Source Patient Position - Orthostatic VS BP Location FiO2 (%)   11/17/23 1243 11/17/23 1243 11/17/23 1243 11/17/23 1243 --   Temporal Monitor Lying Right arm       Pain Score       11/17/23 1654       8           Vitals:    11/17/23 1243 11/17/23 1657   BP: 133/64 121/78   Pulse: 90 (!) 106   Patient Position - Orthostatic VS: Lying          Visual Acuity      ED Medications  Medications   lidocaine (LIDODERM) 5 % patch 1 patch (1 patch Topical Medication Applied 11/17/23 1335)   fluticasone-vilanterol 200-25 mcg/actuation 1 puff (has no administration in time range)   ferrous sulfate tablet 325 mg (has no administration in time range)   metoprolol tartrate (LOPRESSOR) tablet 25 mg (has no administration in time range)   varenicline (CHANTIX) tablet 1 mg (has no administration in time range)   albuterol inhalation solution 2.5 mg (has no administration in time range)   albuterol (PROVENTIL HFA,VENTOLIN HFA) inhaler 2 puff (has no administration in time range)   gabapentin (NEURONTIN) capsule 300 mg (has no administration in time range)   pravastatin (PRAVACHOL) tablet 40 mg (has no administration in time range)   ranolazine (RANEXA) 12 hr tablet 500 mg (has no administration in time range)   tamsulosin (FLOMAX) capsule 0.4 mg (has no administration in time range)   thiamine tablet 100 mg (has no administration in time range)   folic acid (FOLVITE) tablet 1 mg (has no administration in time range)   multivitamin-minerals (CENTRUM) tablet 1 tablet (has no administration in time range)   nicotine (NICODERM CQ) 21 mg/24 hr TD 24 hr patch 1 patch (has no administration in time range)   pantoprazole (PROTONIX) injection 40 mg (has no administration in time range)   acetaminophen (TYLENOL) tablet 650 mg (has no administration in time range)   methocarbamol (ROBAXIN) tablet 500 mg (has no administration in time range)   lidocaine (LIDODERM) 5 % patch 1 patch (has no administration in time range)   sodium chloride 0.9 % bolus 1,000 mL (0 mL Intravenous Stopped 11/17/23 1611)   ondansetron (ZOFRAN) injection 4 mg (4 mg Intravenous Given 11/17/23 1326)   iohexol (OMNIPAQUE) 350 MG/ML injection (MULTI-DOSE) 100 mL (100 mL Intravenous Given 11/17/23 1413)       Diagnostic Studies  Results Reviewed       Procedure Component Value Units Date/Time    CBC and differential [[id number]]  (Abnormal) Collected: 11/17/23 1322    Lab Status: Final result Specimen: Blood from Arm, Left Updated: 11/17/23 1356     WBC 7.38 Thousand/uL      RBC 4.01 Million/uL      Hemoglobin 13.6 g/dL      Hematocrit 38.2 %      MCV 95 fL      MCH 33.9 pg      MCHC 35.6 g/dL      RDW 13.9 %      MPV 10.8 fL      Platelets 92 Thousands/uL      nRBC 0 /100 WBCs      Neutrophils Relative 74 %      Immat GRANS % 0 %      Lymphocytes Relative 16 %      Monocytes Relative 9 %      Eosinophils Relative 0 %      Basophils Relative 1 %      Neutrophils Absolute 5.39 Thousands/µL      Immature Grans Absolute 0.03 Thousand/uL      Lymphocytes Absolute 1.17 Thousands/µL      Monocytes Absolute 0.66 Thousand/µL      Eosinophils Absolute 0.03 Thousand/µL      Basophils Absolute 0.10 Thousands/µL     Narrative: This is an appended report. These results have been appended to a previously verified report.     Smear Review(Phlebs Do Not Order) [[id number]]  (Abnormal) Collected: 11/17/23 1322    Lab Status: Final result Specimen: Blood from Arm, Left Updated: 11/17/23 1356     RBC Morphology Present     Platelet Estimate Decreased     Basophilic Stippling Present    Comprehensive metabolic panel [[id number]]  (Abnormal) Collected: 11/17/23 1322    Lab Status: Final result Specimen: Blood from Arm, Left Updated: 11/17/23 1352     Sodium 125 mmol/L      Potassium 4.3 mmol/L      Chloride 88 mmol/L      CO2 24 mmol/L      ANION GAP 13 mmol/L      BUN 19 mg/dL      Creatinine 1.24 mg/dL      Glucose 95 mg/dL      Calcium 8.7 mg/dL      AST 86 U/L      ALT 70 U/L      Alkaline Phosphatase 74 U/L      Total Protein 7.3 g/dL      Albumin 4.2 g/dL      Total Bilirubin 0.88 mg/dL      eGFR 62 ml/min/1.73sq m     Narrative:      [place] guidelines for Chronic Kidney Disease (CKD):     Stage 1 with normal or high GFR (GFR > 90 mL/min/1.73 square meters)    Stage 2 Mild CKD (GFR = 60-89 mL/min/1.73 square meters)    Stage 3A Moderate CKD (GFR = 45-59 mL/min/1.73 square meters)    Stage 3B Moderate CKD (GFR = 30-44 mL/min/1.73 square meters)    Stage 4 Severe CKD (GFR = 15-29 mL/min/1.73 square meters)    Stage 5 End Stage CKD (GFR <15 mL/min/1.73 square meters)  Note: GFR calculation is accurate only with a steady state creatinine    Ethanol [[id number]]  (Abnormal) Collected: 11/17/23 1322    Lab Status: Final result Specimen: Blood from Arm, Left Updated: 11/17/23 1351     Ethanol Lvl 409 mg/dL                    CT spine cervical without contrast   Final Result by Diane Caba MD (11/17 1443)      No acute intracranial abnormality. No cervical spine fracture or traumatic malalignment. Workstation performed: [id number]         CT chest abdomen pelvis w contrast   Final Result by Ingrid Alvarez MD (11/17 4951)      1) Acute, mildly displaced fractures of the right 10th and 11th ribs posteriorly. 2) No other acute thoracic, abdominal or pelvic trauma or other acute pathology. 3) Additional findings as above. Workstation performed: [id number]         CT head without contrast   Final Result by Diane Caba MD (11/17 1443)      No acute intracranial abnormality. No cervical spine fracture or traumatic malalignment. Workstation performed: [id number]                    Procedures  Procedures         ED Course                               SBIRT 22yo+      Flowsheet Row Most Recent Value   Initial Alcohol Screen: US AUDIT-C     1.  How often do you have a drink containing alcohol? 0 Filed at: 11/17/2023 1247   2. How many drinks containing alcohol do you have on a typical day you are drinking? 0 Filed at: 11/17/2023 1247   3a. Male UNDER 65: How often do you have five or more drinks on one occasion? 0 Filed at: 11/17/2023 1247   3b. FEMALE Any Age, or MALE 65+: How often do you have 4 or more drinks on one occassion? 0 Filed at: 11/17/2023 1247   Audit-C Score 0 Filed at: 11/17/2023 1247   BRIGIDA: How many times in the past year have you. .. Used an illegal drug or used a prescription medication for non-medical reasons? Never Filed at: 11/17/2023 1247                      Medical Decision Making  I obtained history from the patient. I ordered and reviewed lab work including CBC, CMP, ethanol to evaluate for electrolyte or renal derangement, leukocytosis, anemia, ethanol level to determine level of intoxication. Patient with a hemoglobin of 13.6, sodium of 125, ethanol 409. Unable to clear patient's cervical spine clinically [facility] criteria given his intoxication. Additionally plan to CT head given his possible head strike on Eliquis. CT head and cervical spine atraumatic. CT chest abdomen pelvis significant for 2 nondisplaced rib fractures without additional traumatic findings. I discussed patient with the hospitalist and admitted patient for alcohol intoxication, hyponatremia. Amount and/or Complexity of Data Reviewed  Labs: ordered. Radiology: ordered. Risk  Prescription drug management. Decision regarding hospitalization. Disposition  Final diagnoses:   Fall, initial encounter   Rib fracture   Alcohol intoxication ([address])   Hyponatremia     Time reflects when diagnosis was documented in both MDM as applicable and the Disposition within this note       Time User Action Codes Description Comment    11/17/2023  3:51 PM Syl Son Add [K74. SCQP] Fall, initial encounter     11/17/2023  3:51 PM Syl Son Add [S22.39XA] Rib fracture     11/17/2023  3:51 PM Demetria Erps Add [M42.486] Alcohol intoxication ([address])     11/17/2023  3:51 PM Demetria Erps Add [E87.1] Hyponatremia     11/17/2023  3:51 PM Demetria Erps Modify [X74. MEUE] Fall, initial encounter     11/17/2023  3:51 PM Demetria Erps Modify [E87.1] Hyponatremia           ED Disposition       ED Disposition   Admit    Condition   Stable    Date/Time   Fri Nov 17, 2023 1550    Comment   Case was discussed with [facility] and the patient's admission status was agreed to be Admission Status: observation status to the service of Dr. Aundrea Chow . Follow-up Information    None         Current Discharge Medication List        CONTINUE these medications which have NOT CHANGED    Details   albuterol (2.5 mg/3 mL) 0.083 % nebulizer solution Take 3 mL (2.5 mg total) by nebulization every 6 (six) hours as needed for wheezing or shortness of breath  Qty: 75 mL, Refills: 0    Associated Diagnoses: COPD (chronic obstructive pulmonary disease) ([facility])      albuterol (Ventolin HFA) 90 mcg/act inhaler Inhale 2 puffs every 4 (four) hours as needed for wheezing  Qty: 18 g, Refills: 0    Comments: Substitution to a formulary equivalent within the same pharmaceutical class is authorized. Associated Diagnoses: COPD (chronic obstructive pulmonary disease) (HCC)      apixaban (ELIQUIS) 5 mg Take 1 tablet (5 mg total) by mouth 2 (two) times a day  Qty: 60 tablet, Refills: 5    Associated Diagnoses: Paroxysmal atrial fibrillation ([facility])      aspirin (ECOTRIN LOW STRENGTH) 81 mg EC tablet Take 81 mg by mouth daily      Blood Glucose Monitoring Suppl (ONE TOUCH ULTRA MINI) w/Device KIT Use as directed  Refills: 0      Breo Ellipta 200-25 MCG/ACT inhaler Inhale 1 puff daily Rinse mouth after use.   Qty: 60 each, Refills: 3    Associated Diagnoses: COPD (chronic obstructive pulmonary disease) (HCC)      ferrous sulfate 325 (65 Fe) mg tablet Take 1 tablet (325 mg total) by mouth daily with breakfast  Qty: 60 tablet, Refills: 5    Associated Diagnoses: Alcohol abuse      folic acid (FOLVITE) 1 mg tablet TAKE 1 TABLET DAILY  Qty: 90 tablet, Refills: 2    Associated Diagnoses: Alcohol abuse      gabapentin (NEURONTIN) 300 mg capsule TAKE ONE CAPSULE THREE TIMES DAILY  Qty: 90 capsule, Refills: 1    Associated Diagnoses: Edema, spinal cord (HCC)      lisinopril (ZESTRIL) 20 mg tablet Take 0.5 tablets (10 mg total) by mouth daily  Qty: 60 tablet, Refills: 0    Associated Diagnoses: Hypertension; Hx of CABG      magnesium Oxide (MAG-OX) 400 mg TABS TAKE 1 TABLET TWO TIMES A DAY  Qty: 60 tablet, Refills: 2    Associated Diagnoses: Alcohol abuse      metFORMIN (GLUCOPHAGE) 500 mg tablet Take 1 tablet (500 mg total) by mouth daily with breakfast Do not start before September 23, 2023. Refills: 0    Associated Diagnoses: Type 2 diabetes mellitus with hyperosmolarity without coma, without long-term current use of insulin (HCC)      metoprolol tartrate (LOPRESSOR) 25 mg tablet Take 1 tablet (25 mg total) by mouth every 12 (twelve) hours  Qty: 60 tablet, Refills: 0    Associated Diagnoses: Paroxysmal atrial fibrillation (HCC)      naltrexone (REVIA) 50 mg tablet Take 1 tablet (50 mg total) by mouth daily  Qty: 30 tablet, Refills: 2    Associated Diagnoses: Alcohol intoxication ([address]);  Alcohol abuse      nicotine (NICODERM CQ) 21 mg/24 hr TD 24 hr patch Place 1 patch on the skin over 24 hours every 24 hours  Qty: 28 patch, Refills: 0    Associated Diagnoses: Encounter for smoking cessation counseling      Baker Sleight LANCETS FINE MISC 3 (three) times a day Test  Refills: 0      pantoprazole (PROTONIX) 40 mg tablet Take 1 tablet (40 mg total) by mouth 2 (two) times a day  Qty: 60 tablet, Refills: 1    Associated Diagnoses: Gastroesophageal reflux disease, unspecified whether esophagitis present      pravastatin (PRAVACHOL) 40 mg tablet TAKE 1 TABLET DAILY WITH DINNER  Qty: 90 tablet, Refills: 2    Associated Diagnoses: Essential (primary) hypertension      ranolazine (RANEXA) 500 mg 12 hr tablet TAKE 1 TABLET EVERY 12 HOURS  Qty: 60 tablet, Refills: 3    Associated Diagnoses: Essential (primary) hypertension      tamsulosin (FLOMAX) 0.4 mg TAKE 1 CAPSULE DAILY  Qty: 90 capsule, Refills: 1    Associated Diagnoses: Hx of prostatic malignancy      Thiamine HCl (vitamin B-1) 100 MG TABS TAKE 1 TABLET DAILY  Qty: 90 tablet, Refills: 0    Associated Diagnoses: Alcohol abuse      varenicline (CHANTIX) 1 mg tablet TAKE 1 TABLET 2 TIMES A DAY  Qty: 56 tablet, Refills: 1    Associated Diagnoses: Nicotine abuse      polyethylene glycol (COLYTE) 4000 mL solution Take 4,000 mL by mouth once for 1 dose Take 4000 mL by mouth once for 1 dose. Use as directed  Qty: 4000 mL, Refills: 0    Associated Diagnoses: Colon cancer screening             No discharge procedures on file. PDMP Review         Value Time User    PDMP Reviewed  Yes 7/26/2021  4:50 PM Horacio Clark MD            ED Provider  Electronically Signed by             Ivanna Saleem MD  11/17/23 5477

## 2023-11-17 NOTE — ASSESSMENT & PLAN NOTE
Wt Readings from Last 3 Encounters:   11/20/23 92.8 kg (204 lb 9.4 oz)   11/03/23 94.3 kg (208 lb)   10/30/23 92.5 kg (204 lb)   Chronic.  Echo 9/2023 - EF 92% moderate diastolic dysfunction   Plan as per essential HTN

## 2023-11-17 NOTE — ASSESSMENT & PLAN NOTE
Patient complaining of back pain after falling on a hardwood floor while intoxicated. Pain worsened and increasing with coughing and movement, hence patient presented to the ED on 11/17.   - C/A/P 11/17/23: Acute, mildly displaced fractures of the right 10th and 11th ribs posteriorly. Patient was for started on Robaxin, Lidoderm patch, and an abdominal binder was placed. Patient states that his has improved significantly. States that his back pain is tolerable.      Plan  Continue with extra strength Tylenol as needed for pain  Continue with Lidoderm patch

## 2023-11-17 NOTE — TELEPHONE ENCOUNTER
Called and spoke to pt and informed of 2 day Eliquis hold. However, pt needed to reschedule with any provider due to not having a  available on 19.33.      recheduled date of colonoscopy (as of today): 12/7/23  Physician performing colonoscopy: Dr. Andreas Ruiz   Location of colonoscopy: [facility]  Bowel prep reviewed with patient: Miralax w/ dul per call center   Instructions reviewed with patient by:   Clearances: Eliquis clearance received - pt to hold 2 days prior and I informed him of this.   Diabetic

## 2023-11-17 NOTE — PLAN OF CARE
Problem: PAIN - ADULT  Goal: Verbalizes/displays adequate comfort level or baseline comfort level  Description: Interventions:  - Encourage patient to monitor pain and request assistance  - Assess pain using appropriate pain scale  - Administer analgesics based on type and severity of pain and evaluate response  - Implement non-pharmacological measures as appropriate and evaluate response  - Consider cultural and social influences on pain and pain management  - Notify physician/advanced practitioner if interventions unsuccessful or patient reports new pain  11/17/2023 1711 by Joleen Marcos RN  Outcome: Progressing  11/17/2023 1711 by Joleen Marcos RN  Outcome: Progressing     Problem: SAFETY ADULT  Goal: Patient will remain free of falls  Description: INTERVENTIONS:  - Educate patient/family on patient safety including physical limitations  - Instruct patient to call for assistance with activity   - Consult OT/PT to assist with strengthening/mobility   - Keep Call bell within reach  - Keep bed low and locked with side rails adjusted as appropriate  - Keep care items and personal belongings within reach  - Initiate and maintain comfort rounds  - Make Fall Risk Sign visible to staff  - Offer Toileting every 2 Hours, in advance of need  - Initiate/Maintain bed alarm  - Obtain necessary fall risk management equipment: walker  - Apply yellow socks and bracelet for high fall risk patients  - Consider moving patient to room near nurses station  11/17/2023 1711 by Joleen Marcos RN  Outcome: Progressing  11/17/2023 1711 by Joleen Marcos RN  Outcome: Progressing  Goal: Maintain or return to baseline ADL function  Description: INTERVENTIONS:  -  Assess patient's ability to carry out ADLs; assess patient's baseline for ADL function and identify physical deficits which impact ability to perform ADLs (bathing, care of mouth/teeth, toileting, grooming, dressing, etc.)  - Assess/evaluate cause of self-care deficits - Assess range of motion  - Assess patient's mobility; develop plan if impaired  - Assess patient's need for assistive devices and provide as appropriate  - Encourage maximum independence but intervene and supervise when necessary  - Involve family in performance of ADLs  - Assess for home care needs following discharge   - Consider OT consult to assist with ADL evaluation and planning for discharge  - Provide patient education as appropriate  11/17/2023 1711 by Ke Parrish RN  Outcome: Progressing  11/17/2023 1711 by Ke Parrish RN  Outcome: Progressing  Goal: Maintains/Returns to pre admission functional level  Description: INTERVENTIONS:  - Perform AM-PAC 6 Click Basic Mobility/ Daily Activity assessment daily.  - Set and communicate daily mobility goal to care team and patient/family/caregiver.    - Collaborate with rehabilitation services on mobility goals if consulted    - Ambulate patient 3 times a day  - Out of bed to chair 3 times a day   - Out of bed for meals 3 times a day  - Out of bed for toileting  - Record patient progress and toleration of activity level   11/17/2023 1711 by Ke Parrish RN  Outcome: Progressing  11/17/2023 1711 by Ke Parrish RN  Outcome: Progressing     Problem: DISCHARGE PLANNING  Goal: Discharge to home or other facility with appropriate resources  Description: INTERVENTIONS:  - Identify barriers to discharge w/patient and caregiver  - Arrange for needed discharge resources and transportation as appropriate  - Identify discharge learning needs (meds, wound care, etc.)  - Arrange for interpretive services to assist at discharge as needed  - Refer to Case Management Department for coordinating discharge planning if the patient needs post-hospital services based on physician/advanced practitioner order or complex needs related to functional status, cognitive ability, or social support system  11/17/2023 1711 by Ke Parrish RN  Outcome: Progressing  11/17/2023 1711 by Fanta Yañez RN  Outcome: Progressing     Problem: Knowledge Deficit  Goal: Patient/family/caregiver demonstrates understanding of disease process, treatment plan, medications, and discharge instructions  Description: Complete learning assessment and assess knowledge base.   Interventions:  - Provide teaching at level of understanding  - Provide teaching via preferred learning methods  11/17/2023 1711 by Fanta Yañez RN  Outcome: Progressing  11/17/2023 1711 by Fanta Yañez RN  Outcome: Progressing

## 2023-11-17 NOTE — ASSESSMENT & PLAN NOTE
Acute on chronic in setting of chronic alcohol use, volume depletion and poor PO intake. Na of 125 on admission improved to 132 after an IV NS bolus. Urine Na 20 & Osm 288 not suggestive of SIADH. Na trended down despite 1500mL fluid restrictions and salt tabs. Patient received a short course of 1.8% saline as per nephro recs. Na levels currently up trending and patient denies any hyponatremic symptoms.   Sodium dropped today to 129 and nephrology started patient on sodium Chloride tablets 2 g 4 times a day    Plan  Patient to be discharged on sodium chloride tablets 2 g 4 times a day for 2 weeks  Discussed with patient to repeat BMP this Friday  Continue fluid restriction

## 2023-11-17 NOTE — ASSESSMENT & PLAN NOTE
Lab Results   Component Value Date    HGBA1C 6.1 (H) 09/20/2023   Adequate glycemic control with ISS and regular diet.   Patient was on home metformin during his stay in the hospital  Continue home Metformin 500mg PO QD with breakfast

## 2023-11-17 NOTE — ASSESSMENT & PLAN NOTE
Patient with hx of CAD s/p CABG on Eliquis 5 mg BID at home, which was held due to patient's report of melena and inpatient labs showing worsening thrombocytopenia in the setting of heavy alcohol abuse. Platelet count continues to uptrend, with stable hemoglobin and no recurrence of bleeding or melena.   Continue with Eliquis

## 2023-11-17 NOTE — ASSESSMENT & PLAN NOTE
Patient presenting with back pain after sustaining a fall 4 days ago while he was intoxicated. Patient states that he has been drinking  a quart of vodka+ice tea/day non-stop since the past 4 days. Patient quit drinking for 2 months however, restarted drinking a month ago because he ran out of naltrexone. ROSAURA at the time of presentation 21.  Tremors improved compared to last few days; tremors well controlled with Ativan    Continue to take thiamine, folate and multivitamins   Continue to take naltrexone after discharge

## 2023-11-17 NOTE — H&P
History and Physical - [facility]  Family Medicine Residency     Patient Information: Beverly Wolfe 66 y.o. male MRN: [id number]  Unit/Bed#: [address] Encounter: [id number]  Admitting Physician: Nori Cowden, DO   PCP: Asher Wong MD  Date of Admission:  11/17/23     Assessment and Plan  * Alcohol intoxication [facility])  Assessment & Plan  Patient presenting with back pain after sustaining a fall 4 days ago while he was intoxicated. Patient states that he has been drinking  a quart of vodka+ice tea/day non-stop since the past 4 days. Patient quit drinking for 2 months however, restarted drinking a month ago because he ran out of naltrexone. CIWA at the time of presentation 21     CIWA protocol   Seizure precautions   Thiamine, folate and multivitamins   Gentle IVF     Hyponatremia  Assessment & Plan  Acute on chronic, likely in setting of chronic alcohol use and poor PO intake. Na 125 on admission. Plan:  S/p NS IVF bolus in ED   Recheck BMP, consider IVF if needed   urine/serum osm  I/O's   Fluid restriction     Back pain  Assessment & Plan  Patient complaining of back pain after falling on a hardwood floor while intoxicated 4 days today. Pain worsened and increasing with coughing and movement, hence patient presented to the ED on 11/17. CT C/A/P: Acute, mildly displaced fractures of the right 10th and 11th ribs posteriorly. Tylenol q6h   Robaxin PRN   Lidoderm     GERD (gastroesophageal reflux disease)  Assessment & Plan  Patient with hx of upper GI bleed and reporting dark stools since weeks. Not actively bleeding.  Hb stable POA    Protonix 40mg IV       Paroxysmal atrial fibrillation with rapid ventricular response (HCC)  Assessment & Plan    EKG pending   Hold eliquis 5mg BID due to reported melena   Continue lopressor 25mg BID     Stage 3a chronic kidney disease [facility])  Assessment & Plan  Lab Results   Component Value Date    EGFR 62 11/17/2023    EGFR 61 11/03/2023    EGFR 59 10/30/2023    CREATININE 1.24 11/17/2023    CREATININE 1.26 11/03/2023    CREATININE 1.29 10/30/2023     Stable, at baseline Cr of 1.1-1.3. Plan:  - I/O's  - Avoid nephrotoxins  - Renally dose medications    Chronic heart failure with preserved ejection fraction [facility])  Assessment & Plan  Wt Readings from Last 3 Encounters:   11/17/23 90.9 kg (200 lb 4.8 oz)   11/03/23 94.3 kg (208 lb)   10/30/23 92.5 kg (204 lb)     Echo 9/2023 - EF 98% moderate diastolic dysfunction     Gentle fluid hydration   I/O's   Monitor weights and V/S           CAD (coronary artery disease)  Assessment & Plan  Patient with hx of CAD s/p CABG. Hold  home eliquis 5mg BID and aspirin 81mg due to reported melena. No BM in 4 days     Dyslipidemia  Assessment & Plan  Chronic. Continue Pravastatin 40 mg daily     Type 2 diabetes mellitus ([address])  Assessment & Plan  Lab Results   Component Value Date    HGBA1C 6.1 (H) 09/20/2023     ISS   Monitor         COPD (chronic obstructive pulmonary disease) ([address])  Assessment & Plan  Patient has SOB at baseline, not on home oxygen. Currently smoking 1.5 packs/day     Continue home inhalers   Nicotine patch   Continue chantix          Fluids: NS 1L bolus   Electrolyte repletion: Replete as needed. Nutrition:        Diet Orders   (From admission, onward)                 Start     Ordered    11/17/23 1709  Diet Vlad/CHO Controlled; Consistent Carbohydrate Diet Level 2 (5 carb servings/75 grams CHO/meal); Fluid Restriction 1500 ML  Diet effective now        References:    Adult Nutrition Support Algorithm    RD Therapeutic Diet Order Protocol   Question Answer Comment   Diet Type Vlad/CHO Controlled    Vlad/CHO Controlled Consistent Carbohydrate Diet Level 2 (5 carb servings/75 grams CHO/meal)    Other Restriction(s): Fluid Restriction 1500 ML    RD to adjust diet per protocol?  Yes        11/17/23 1716                   VTE Prophylaxis: VTE Score: 6 High Risk (Score >/= 5) - Pharmacological DVT Prophylaxis Contraindicated. Sequential Compression Devices Ordered. Code Status: Level 1 - Full Code  Anticipated Length of Stay:  Patient will be admitted on an Observation basis with an anticipated length of stay of  greater than 2 midnights. Justification for Hospital Stay: Alcohol intoxication and rib fracture   Total Time for Visit, including Counseling / Coordination of Care: 55 mins. Greater than 50% of this total time spent on direct patient counseling and coordination of care. Patient Information Sharing:  Justus Harry does not wish inpatient team to notify their loved ones of their condition and current plan. Chief Complaint:     Chief Complaint   Patient presents with    Back Pain     Fall 2 days ago, c/o lower back pain. Ems also states ETOH       Subjective      History of Present Illness:     Justus Harry is a 66 y.o. male with PMHx of COPD not on home oxygen, EtOH abuse, HLD, HTN, CKD, Afib, prostate CA s/p radiation, CAD s/p CABG, DM II presents with back pain after sustaining a fall while intoxicated 4 days ago. Patient does not recall the events leading up the fall or many details relating to it. Patient presented to the ED on 11/17 because of worsening of right sided back pain especially with coughing and movement. Patient notes he has been drinking constantly since the past 4 days about a quart of vodka mixed with iced tea with last drink being the morning of presentation. Patient has not eaten in 4 days. Patient also complained of abdominal pain because he was hungry. Patient appeared to be agitated and restless during the encounter. Patient also notes melena since weeks however, no BM in 4 days. Denies hematemesis. Review of Systems:  Review of Systems   Constitutional:  Positive for activity change. Respiratory:  Negative for cough, chest tightness and shortness of breath. Cardiovascular:  Negative for chest pain and leg swelling.    Gastrointestinal:  Positive for abdominal pain, nausea and vomiting. Endocrine: Negative for polyuria. Genitourinary:  Negative for dysuria, flank pain and frequency. Musculoskeletal:  Positive for back pain and myalgias. Skin:  Positive for color change. Neurological:  Positive for tremors and numbness. Psychiatric/Behavioral:  Positive for agitation. The patient is hyperactive. Past Medical History:   Diagnosis Date    Acute on chronic kidney failure      Alcohol withdrawal ([address]) 06/07/2019    Atrial fibrillation (HCC)     Cancer (HCC)     prostate ca,had radiation    Cardiac disease     stents,then triple bypass    COPD (chronic obstructive pulmonary disease) (HCC)     ETOH abuse     Hx of heart artery stent     2014    Hyperlipidemia     Hypertension     Hypovolemic shock ([address]) 12/22/2019    Lumbar spondylitis ([address]) 10/13/2022    Nasal bone fracture 10/10/2022    Prostate CA ([address])     S/P CABG x 3     2004    Sleep apnea      Past Surgical History:   Procedure Laterality Date    CARDIAC CATHETERIZATION      2 stents    CORONARY ARTERY BYPASS GRAFT  2004    AK ARTHRD ANT INTERBODY MIN [address] CRV BELOW C2 N/A 12/16/2020    Procedure: Anterior cervical discectomy with fusion C4-C7; Posterior cervical decompression and fusion C2-T2;  Surgeon: Sandra Mahan MD;  Location: [facility];  Service: Neurosurgery    TONSILLECTOMY       No Known Allergies  Prior to Admission Medications   Prescriptions Last Dose Informant Patient Reported? Taking? Blood Glucose Monitoring Suppl (ONE TOUCH ULTRA MINI) w/Device KIT Past Week Self Yes Yes   Sig: Use as directed   Breo Ellipta 200-25 MCG/ACT inhaler Past Week Self No Yes   Sig: Inhale 1 puff daily Rinse mouth after use.    Birgit Gaunt LANCETS FINE MISC Past Week Self Yes Yes   Sig: 3 (three) times a day Test   Thiamine HCl (vitamin B-1) 100 MG TABS Past Week Self No Yes   Sig: TAKE 1 TABLET DAILY   albuterol (2.5 mg/3 mL) 0.083 % nebulizer solution Past Week Self No Yes   Sig: Take 3 mL (2.5 mg total) by nebulization every 6 (six) hours as needed for wheezing or shortness of breath   albuterol (Ventolin HFA) 90 mcg/act inhaler Past Week Self No Yes   Sig: Inhale 2 puffs every 4 (four) hours as needed for wheezing   apixaban (ELIQUIS) 5 mg Past Week Self No Yes   Sig: Take 1 tablet (5 mg total) by mouth 2 (two) times a day   aspirin (ECOTRIN LOW STRENGTH) 81 mg EC tablet Past Week Self Yes Yes   Sig: Take 81 mg by mouth daily   ferrous sulfate 325 (65 Fe) mg tablet Past Week Self No Yes   Sig: Take 1 tablet (325 mg total) by mouth daily with breakfast   folic acid (FOLVITE) 1 mg tablet Past Week Self No Yes   Sig: TAKE 1 TABLET DAILY   gabapentin (NEURONTIN) 300 mg capsule Past Week Self No Yes   Sig: TAKE ONE CAPSULE THREE TIMES DAILY   lisinopril (ZESTRIL) 20 mg tablet Past Week Self No Yes   Sig: Take 0.5 tablets (10 mg total) by mouth daily   magnesium Oxide (MAG-OX) 400 mg TABS Past Week Self No Yes   Sig: TAKE 1 TABLET TWO TIMES A DAY   metFORMIN (GLUCOPHAGE) 500 mg tablet Past Week Self No Yes   Sig: Take 1 tablet (500 mg total) by mouth daily with breakfast Do not start before September 23, 2023. metoprolol tartrate (LOPRESSOR) 25 mg tablet Past Week Self No Yes   Sig: Take 1 tablet (25 mg total) by mouth every 12 (twelve) hours   naltrexone (REVIA) 50 mg tablet Past Week Self No Yes   Sig: Take 1 tablet (50 mg total) by mouth daily   nicotine (NICODERM CQ) 21 mg/24 hr TD 24 hr patch Past Week Self No Yes   Sig: Place 1 patch on the skin over 24 hours every 24 hours   pantoprazole (PROTONIX) 40 mg tablet Past Week  No Yes   Sig: Take 1 tablet (40 mg total) by mouth 2 (two) times a day   polyethylene glycol (COLYTE) 4000 mL solution   No No   Sig: Take 4,000 mL by mouth once for 1 dose Take 4000 mL by mouth once for 1 dose.  Use as directed   pravastatin (PRAVACHOL) 40 mg tablet Past Week Self No Yes   Sig: TAKE 1 TABLET DAILY WITH DINNER   ranolazine (RANEXA) 500 mg 12 hr tablet Past Week Self No Yes   Sig: TAKE 1 TABLET EVERY 12 HOURS   tamsulosin (FLOMAX) 0.4 mg Past Week Self No Yes   Sig: TAKE 1 CAPSULE DAILY   varenicline (CHANTIX) 1 mg tablet Past Week Self No Yes   Sig: TAKE 1 TABLET 2 TIMES A DAY      Facility-Administered Medications: None     Social History     Socioeconomic History    Marital status: Single     Spouse name: Not on file    Number of children: Not on file    Years of education: Not on file    Highest education level: Not on file   Occupational History    Occupation: contractor, 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)   Tobacco Use    Smoking status: Every Day     Packs/day: 1.50     Years: 40.00     Total pack years: 60.00     Types: Cigarettes    Smokeless tobacco: Never   Vaping Use    Vaping Use: Never used   Substance and Sexual Activity    Alcohol use: Not Currently     Alcohol/week: 4.0 standard drinks of alcohol     Types: 4 Standard drinks or equivalent per week    Drug use: No    Sexual activity: Not Currently   Other Topics Concern    Not on file   Social History Narrative    Not on file     Social Determinants of Health     Financial Resource Strain: Low Risk  (11/2/2023)    Overall Financial Resource Strain (CARDIA)     Difficulty of Paying Living Expenses: Not very hard   Food Insecurity: No Food Insecurity (11/2/2023)    Hunger Vital Sign     Worried About Running Out of Food in the Last Year: Never true     Ran Out of Food in the Last Year: Never true   Transportation Needs: No Transportation Needs (11/2/2023)    PRAPARE - Transportation     Lack of Transportation (Medical): No     Lack of Transportation (Non-Medical):  No   Physical Activity: Not on file   Stress: No Stress Concern Present (11/2/2023)    [address] [facility]     Feeling of Stress : Not at all   Social Connections: Unknown (4/15/2021)    Social Connection and Isolation Panel [NHANES]     Frequency of Communication with Friends and Family: More than three times a week     Frequency of Social Gatherings with Friends and Family: Not on file     Attends Orthodox Services: Not on file     Active Member of Clubs or Organizations: Not on file     Attends Club or Organization Meetings: Not on file     Marital Status: Not on file   Intimate Partner Violence: Not on file   Housing Stability: Low Risk  (11/2/2023)    Housing Stability Vital Sign     Unable to Pay for Housing in the Last Year: No     Number of Places Lived in the Last Year: 1     Unstable Housing in the Last Year: No     Family History   Problem Relation Age of Onset    Diabetes Mother     Uterine cancer Mother     COPD Father     Hypertension Father         Patient Pre-hospital Living Situation: Home  Patient Pre-hospital Level of Mobility: not limited   Patient Pre-hospital Diet Restrictions: unknown           Objective     Physical Exam:   Vitals:   Blood Pressure: 121/78 (11/17/23 1657)  Pulse: (!) 106 (11/17/23 1657)  Temperature: 98.6 °F (37 °C) (11/17/23 1657)  Temp Source: Oral (11/17/23 1657)  Respirations: 19 (11/17/23 1657)  Height: 6' 2" (188 cm) (11/17/23 1657)  Weight - Scale: 90.9 kg (200 lb 4.8 oz) (11/17/23 1657)  SpO2: 90 % (11/17/23 1657)     Physical Exam  Constitutional:       Appearance: He is toxic-appearing. Eyes:      Conjunctiva/sclera: Conjunctivae normal.   Cardiovascular:      Rate and Rhythm: Normal rate and regular rhythm. Pulses: Normal pulses. Heart sounds: Normal heart sounds. No murmur heard. Pulmonary:      Effort: Pulmonary effort is normal. No respiratory distress. Breath sounds: No wheezing. Abdominal:      General: Bowel sounds are normal.      Palpations: Abdomen is soft. Tenderness: There is no abdominal tenderness. There is no guarding. Musculoskeletal:         General: Tenderness (Right mid back, bruising) and signs of injury present. No swelling. Right lower leg: No edema. Left lower leg: No edema. Skin:     Findings: Bruising and erythema present. Lab Results: I have personally reviewed pertinent reports. Results from last 7 days   Lab Units 11/17/23  1322   WBC Thousand/uL 7.38   HEMOGLOBIN g/dL 13.6   HEMATOCRIT % 38.2   PLATELETS Thousands/uL 92*   NEUTROS PCT % 74   LYMPHS PCT % 16   MONOS PCT % 9   EOS PCT % 0     Results from last 7 days   Lab Units 11/17/23  1322   POTASSIUM mmol/L 4.3   CHLORIDE mmol/L 88*   CO2 mmol/L 24   BUN mg/dL 19   CREATININE mg/dL 1.24   CALCIUM mg/dL 8.7   ALK PHOS U/L 74   ALT U/L 70*   AST U/L 86*   EGFR ml/min/1.73sq m 62                            Invalid input(s): "URIBILINOGEN"                    Imaging: I have personally reviewed pertinent reports. CT chest abdomen pelvis w contrast    Result Date: 11/17/2023  1) Acute, mildly displaced fractures of the right 10th and 11th ribs posteriorly. 2) No other acute thoracic, abdominal or pelvic trauma or other acute pathology. 3) Additional findings as above. Workstation performed: [id number]     CT spine cervical without contrast    Result Date: 11/17/2023  No acute intracranial abnormality. No cervical spine fracture or traumatic malalignment. Workstation performed: [id number]     CT head without contrast    Result Date: 11/17/2023  No acute intracranial abnormality. No cervical spine fracture or traumatic malalignment.  Workstation performed: [id number]         EKG, Pathology, and Other Studies Reviewed on Admission:   EKG  Result Date: 11/17/23  Impression:  pending            Entire H&P was discussed with Dr. Ana Paula Martinez who agreed to what is noted above     ** Please Note: This note has been constructed using a voice recognition system **     Diane Webster MD  11/17/23  5:54 PM

## 2023-11-17 NOTE — ASSESSMENT & PLAN NOTE
Patient has SOB at baseline, not on home oxygen. Currently smoking 1.5 packs/day.   He was started on his home inhalers    Continue home inhalers Breo Ellipta  Continue with nicotine patch   Continue chantix

## 2023-11-17 NOTE — ASSESSMENT & PLAN NOTE
Lab Results   Component Value Date    EGFR 69 11/20/2023    EGFR 79 11/20/2023    EGFR 62 11/19/2023    CREATININE 1.14 11/20/2023    CREATININE 1.01 11/20/2023    CREATININE 1.24 11/19/2023   Stable, at baseline Cr of 1.1-1.3.   Recommend follow-up outpatient with nephrologist

## 2023-11-18 PROBLEM — K59.00 CONSTIPATION: Status: ACTIVE | Noted: 2023-11-18

## 2023-11-18 LAB
ALBUMIN SERPL BCP-MCNC: 4 G/DL (ref 3.5–5)
ALP SERPL-CCNC: 72 U/L (ref 34–104)
ALT SERPL W P-5'-P-CCNC: 68 U/L (ref 7–52)
ANION GAP SERPL CALCULATED.3IONS-SCNC: 8 MMOL/L
AST SERPL W P-5'-P-CCNC: 81 U/L (ref 13–39)
BASOPHILS # BLD MANUAL: 0.12 THOUSAND/UL (ref 0–0.1)
BASOPHILS NFR MAR MANUAL: 2 % (ref 0–1)
BILIRUB SERPL-MCNC: 0.83 MG/DL (ref 0.2–1)
BUN SERPL-MCNC: 17 MG/DL (ref 5–25)
CALCIUM SERPL-MCNC: 8.6 MG/DL (ref 8.4–10.2)
CHLORIDE SERPL-SCNC: 97 MMOL/L (ref 96–108)
CO2 SERPL-SCNC: 27 MMOL/L (ref 21–32)
CREAT SERPL-MCNC: 1.23 MG/DL (ref 0.6–1.3)
EOSINOPHIL # BLD MANUAL: 0 THOUSAND/UL (ref 0–0.4)
EOSINOPHIL NFR BLD MANUAL: 0 % (ref 0–6)
ERYTHROCYTE [DISTWIDTH] IN BLOOD BY AUTOMATED COUNT: 14.3 % (ref 11.6–15.1)
GFR SERPL CREATININE-BSD FRML MDRD: 62 ML/MIN/1.73SQ M
GLUCOSE SERPL-MCNC: 131 MG/DL (ref 65–140)
GLUCOSE SERPL-MCNC: 137 MG/DL (ref 65–140)
GLUCOSE SERPL-MCNC: 140 MG/DL (ref 65–140)
GLUCOSE SERPL-MCNC: 83 MG/DL (ref 65–140)
GLUCOSE SERPL-MCNC: 93 MG/DL (ref 65–140)
HCT VFR BLD AUTO: 38.9 % (ref 36.5–49.3)
HGB BLD-MCNC: 13.5 G/DL (ref 12–17)
LYMPHOCYTES # BLD AUTO: 1.27 THOUSAND/UL (ref 0.6–4.47)
LYMPHOCYTES # BLD AUTO: 17 % (ref 14–44)
MAGNESIUM SERPL-MCNC: 1.2 MG/DL (ref 1.9–2.7)
MCH RBC QN AUTO: 33.8 PG (ref 26.8–34.3)
MCHC RBC AUTO-ENTMCNC: 34.7 G/DL (ref 31.4–37.4)
MCV RBC AUTO: 98 FL (ref 82–98)
MONOCYTES # BLD AUTO: 0.69 THOUSAND/UL (ref 0–1.22)
MONOCYTES NFR BLD: 12 % (ref 4–12)
NEUTROPHILS # BLD MANUAL: 3.69 THOUSAND/UL (ref 1.85–7.62)
NEUTS SEG NFR BLD AUTO: 64 % (ref 43–75)
OSMOLALITY UR: 288 MMOL/KG
PHOSPHATE SERPL-MCNC: 2.5 MG/DL (ref 2.3–4.1)
PLATELET # BLD AUTO: 85 THOUSANDS/UL (ref 149–390)
PLATELET BLD QL SMEAR: ABNORMAL
PMV BLD AUTO: 10.4 FL (ref 8.9–12.7)
POTASSIUM SERPL-SCNC: 4.5 MMOL/L (ref 3.5–5.3)
PROT SERPL-MCNC: 7.1 G/DL (ref 6.4–8.4)
RBC # BLD AUTO: 3.99 MILLION/UL (ref 3.88–5.62)
RBC MORPH BLD: NORMAL
SODIUM 24H UR-SCNC: 20 MOL/L
SODIUM SERPL-SCNC: 132 MMOL/L (ref 135–147)
VARIANT LYMPHS # BLD AUTO: 5 %
WBC # BLD AUTO: 5.77 THOUSAND/UL (ref 4.31–10.16)

## 2023-11-18 PROCEDURE — C9113 INJ PANTOPRAZOLE SODIUM, VIA: HCPCS

## 2023-11-18 PROCEDURE — 82948 REAGENT STRIP/BLOOD GLUCOSE: CPT

## 2023-11-18 PROCEDURE — 80053 COMPREHEN METABOLIC PANEL: CPT

## 2023-11-18 PROCEDURE — 85027 COMPLETE CBC AUTOMATED: CPT

## 2023-11-18 PROCEDURE — 85007 BL SMEAR W/DIFF WBC COUNT: CPT

## 2023-11-18 PROCEDURE — 99232 SBSQ HOSP IP/OBS MODERATE 35: CPT | Performed by: FAMILY MEDICINE

## 2023-11-18 PROCEDURE — 84300 ASSAY OF URINE SODIUM: CPT

## 2023-11-18 PROCEDURE — 83735 ASSAY OF MAGNESIUM: CPT

## 2023-11-18 PROCEDURE — 84100 ASSAY OF PHOSPHORUS: CPT

## 2023-11-18 PROCEDURE — 83935 ASSAY OF URINE OSMOLALITY: CPT

## 2023-11-18 RX ORDER — METHOCARBAMOL 500 MG/1
500 TABLET, FILM COATED ORAL EVERY 6 HOURS SCHEDULED
Status: DISCONTINUED | OUTPATIENT
Start: 2023-11-18 | End: 2023-11-21 | Stop reason: HOSPADM

## 2023-11-18 RX ORDER — ACETAMINOPHEN 325 MG/1
975 TABLET ORAL EVERY 12 HOURS
Status: DISCONTINUED | OUTPATIENT
Start: 2023-11-18 | End: 2023-11-18

## 2023-11-18 RX ORDER — ACETAMINOPHEN 325 MG/1
650 TABLET ORAL ONCE
Status: COMPLETED | OUTPATIENT
Start: 2023-11-18 | End: 2023-11-18

## 2023-11-18 RX ORDER — POLYETHYLENE GLYCOL 3350 17 G/17G
17 POWDER, FOR SOLUTION ORAL DAILY
Status: DISCONTINUED | OUTPATIENT
Start: 2023-11-19 | End: 2023-11-21 | Stop reason: HOSPADM

## 2023-11-18 RX ORDER — MAGNESIUM SULFATE HEPTAHYDRATE 40 MG/ML
4 INJECTION, SOLUTION INTRAVENOUS ONCE
Status: COMPLETED | OUTPATIENT
Start: 2023-11-18 | End: 2023-11-18

## 2023-11-18 RX ORDER — LORAZEPAM 1 MG/1
2 TABLET ORAL ONCE
Status: COMPLETED | OUTPATIENT
Start: 2023-11-18 | End: 2023-11-18

## 2023-11-18 RX ORDER — ACETAMINOPHEN 325 MG/1
975 TABLET ORAL EVERY 8 HOURS SCHEDULED
Status: DISCONTINUED | OUTPATIENT
Start: 2023-11-18 | End: 2023-11-21 | Stop reason: HOSPADM

## 2023-11-18 RX ADMIN — APIXABAN 5 MG: 5 TABLET, FILM COATED ORAL at 10:13

## 2023-11-18 RX ADMIN — RANOLAZINE 500 MG: 500 TABLET, FILM COATED, EXTENDED RELEASE ORAL at 16:46

## 2023-11-18 RX ADMIN — GABAPENTIN 300 MG: 300 CAPSULE ORAL at 21:54

## 2023-11-18 RX ADMIN — PRAVASTATIN SODIUM 40 MG: 40 TABLET ORAL at 16:46

## 2023-11-18 RX ADMIN — TAMSULOSIN HYDROCHLORIDE 0.4 MG: 0.4 CAPSULE ORAL at 08:24

## 2023-11-18 RX ADMIN — PANTOPRAZOLE SODIUM 40 MG: 40 INJECTION, POWDER, FOR SOLUTION INTRAVENOUS at 09:25

## 2023-11-18 RX ADMIN — FOLIC ACID 1 MG: 1 TABLET ORAL at 08:23

## 2023-11-18 RX ADMIN — LORAZEPAM 2 MG: 1 TABLET ORAL at 17:07

## 2023-11-18 RX ADMIN — RANOLAZINE 500 MG: 500 TABLET, FILM COATED, EXTENDED RELEASE ORAL at 05:33

## 2023-11-18 RX ADMIN — ACETAMINOPHEN 650 MG: 325 TABLET ORAL at 16:46

## 2023-11-18 RX ADMIN — LIDOCAINE 1 PATCH: 50 PATCH CUTANEOUS at 08:24

## 2023-11-18 RX ADMIN — LORAZEPAM 2 MG: 1 TABLET ORAL at 10:13

## 2023-11-18 RX ADMIN — METOPROLOL TARTRATE 25 MG: 25 TABLET, FILM COATED ORAL at 08:24

## 2023-11-18 RX ADMIN — Medication 1 TABLET: at 08:23

## 2023-11-18 RX ADMIN — ACETAMINOPHEN 975 MG: 325 TABLET ORAL at 21:54

## 2023-11-18 RX ADMIN — METHOCARBAMOL 500 MG: 500 TABLET ORAL at 13:02

## 2023-11-18 RX ADMIN — NICOTINE 1 PATCH: 21 PATCH, EXTENDED RELEASE TRANSDERMAL at 08:24

## 2023-11-18 RX ADMIN — FERROUS SULFATE TAB 325 MG (65 MG ELEMENTAL FE) 325 MG: 325 (65 FE) TAB at 08:24

## 2023-11-18 RX ADMIN — MAGNESIUM SULFATE HEPTAHYDRATE 4 G: 40 INJECTION, SOLUTION INTRAVENOUS at 10:13

## 2023-11-18 RX ADMIN — ACETAMINOPHEN 975 MG: 325 TABLET ORAL at 13:02

## 2023-11-18 RX ADMIN — GABAPENTIN 300 MG: 300 CAPSULE ORAL at 16:46

## 2023-11-18 RX ADMIN — METOPROLOL TARTRATE 25 MG: 25 TABLET, FILM COATED ORAL at 21:54

## 2023-11-18 RX ADMIN — METHOCARBAMOL 500 MG: 500 TABLET ORAL at 05:33

## 2023-11-18 RX ADMIN — FLUTICASONE FUROATE AND VILANTEROL TRIFENATATE 1 PUFF: 200; 25 POWDER RESPIRATORY (INHALATION) at 09:27

## 2023-11-18 RX ADMIN — APIXABAN 5 MG: 5 TABLET, FILM COATED ORAL at 17:07

## 2023-11-18 RX ADMIN — GABAPENTIN 300 MG: 300 CAPSULE ORAL at 08:23

## 2023-11-18 RX ADMIN — ACETAMINOPHEN 650 MG: 325 TABLET ORAL at 05:33

## 2023-11-18 RX ADMIN — THIAMINE HCL TAB 100 MG 100 MG: 100 TAB at 08:23

## 2023-11-18 RX ADMIN — METHOCARBAMOL 500 MG: 500 TABLET ORAL at 17:07

## 2023-11-18 NOTE — UTILIZATION REVIEW
Initial Clinical Review    Observation 11/17/23 @ [id number] converted to inpatient admission 11/18/23 for continued care & tx for alcohol intoxication. Admission: Date/Time/Statement:   Admission Orders (From admission, onward)       Ordered        11/18/23 1227  Inpatient Admission  Once            11/17/23 1552  Place in Observation  Once                          Orders Placed This Encounter   Procedures    Inpatient Admission     Standing Status:   Standing     Number of Occurrences:   1     Order Specific Question:   Level of Care     Answer:   Med Surg [16]     Order Specific Question:   Estimated length of stay     Answer:   More than 2 Midnights     Order Specific Question:   Certification     Answer:   I certify that inpatient services are medically necessary for this patient for a duration of greater than two midnights. See H&P and MD Progress Notes for additional information about the patient's course of treatment. ED Arrival Information       Expected   -    Arrival   11/17/2023 12:40    Acuity   Urgent              Means of arrival   Ambulance    [facility]    Admission type   Emergency              Arrival complaint   Back Pain / ETOH             Chief Complaint   Patient presents with    Back Pain     Fall 2 days ago, c/o lower back pain. Ems also states ETOH       Initial Presentation:   66 yom to ER from home c/o R sided flank & R rib pain s/p fall 2 days ago while intoxicated, unsure if head strike. Hx COPD not on home oxygen, EtOH abuse, HLD, HTN, CKD, Afib, prostate CA s/p radiation, CAD s/p CABG, DM II. Patient continues to drink today, presents visibly intoxicated, oriented with Right-sided flank ecchymosis. Right posterior rib tenderness. No visible or palpable hematoma. Admission work-up showing acute, mildly displaced fractures of the right 10th and 11th ribs posteriorly on imaging, hyponatremia, ETOH 409.  Placed in observation status for alcohol intoxication. Observation to IP admission 11/18/23:  Shaky & tremulous. Restart Eliquis and monitor for any recurrence of bleeding or melena (had PTA). Start scheduled Tylenol and Robaxin and continue with Lidoderm patch and add abdominal binder to see if pain improves. continue with CIWA protocol, required IV Ativan. Na improved, trial off IVF.     Date: 11/19/23    Day 3: Has surpassed a 2nd midnight with active treatments and services, which include pain mgt, CIWA protocol, monitor for withdrawal s/s, follow labs/lytes, monitor VS.         ED Triage Vitals   Temperature Pulse Respirations Blood Pressure SpO2   11/17/23 1243 11/17/23 1243 11/17/23 1243 11/17/23 1243 11/17/23 1243   98.3 °F (36.8 °C) 90 22 133/64 92 %      Temp Source Heart Rate Source Patient Position - Orthostatic VS BP Location FiO2 (%)   11/17/23 1243 11/17/23 1243 11/17/23 1243 11/17/23 1243 --   Temporal Monitor Lying Right arm       Pain Score       11/17/23 1654       8          Wt Readings from Last 1 Encounters:   11/18/23 86.1 kg (189 lb 13.1 oz)     Additional Vital Signs:   Date/Time Temp Pulse Resp BP MAP (mmHg) SpO2 O2 Device Patient Position - Orthostatic VS   11/18/23 05:36:51 -- 84 -- 152/87 109 88 % Abnormal  -- --   11/18/23 02:15:18 -- 79 -- 147/89 108 88 % Abnormal  -- --   11/18/23 0100 -- 86 -- 145/86 -- -- -- --   11/17/23 23:30:10 99.3 °F (37.4 °C) 74 -- 148/90 109 89 % Abnormal  -- --   11/17/23 23:28:29 99.3 °F (37.4 °C) 79 -- 161/140 Abnormal  147 91 % -- --   11/17/23 21:38:40 -- 88 -- 118/63 81 88 % Abnormal  -- --   11/17/23 2136 -- 88 -- 118/63 -- -- -- --   11/17/23 19:17:10 99.1 °F (37.3 °C) 98 -- 109/58 75 94 % -- --   11/17/23 19:16:06 99.1 °F (37.3 °C) 102 -- 109/58 75 88 % Abnormal  None (Room air) --   11/17/23 1900 -- 112 Abnormal  -- 109/60 -- -- -- --   11/17/23 1816 -- 118 Abnormal  18 -- -- 93 % -- --   11/17/23 16:57:01 98.6 °F (37 °C) 106 Abnormal  19 121/78 92 90 % None (Room air) -- 11/17/23 1243 98.3 °F (36.8 °C) 90 22 133/64 -- 92 % None (Room air) Lying     Pertinent Labs/Diagnostic Test Results:   CT spine cervical without contrast   Final Result (11/17 1443)      No acute intracranial abnormality. No cervical spine fracture or traumatic malalignment. CT chest abdomen pelvis w contrast   Final Result  (11/17 1531)      1) Acute, mildly displaced fractures of the right 10th and 11th ribs posteriorly. 2) No other acute thoracic, abdominal or pelvic trauma or other acute pathology. CT head without contrast   Final Result (11/17 1443)      No acute intracranial abnormality. No cervical spine fracture or traumatic malalignment.             Results from last 7 days   Lab Units 11/19/23  0531 11/18/23  0859 11/17/23  1322   WBC Thousand/uL 4.52 5.77 7.38   HEMOGLOBIN g/dL 14.0 13.5 13.6   HEMATOCRIT % 41.1 38.9 38.2   PLATELETS Thousands/uL 37* 85* 92*   NEUTROS ABS Thousands/µL 2.82  --  5.39       Results from last 7 days   Lab Units 11/19/23  0531 11/18/23  0859 11/17/23  1757 11/17/23  1322   SODIUM mmol/L 128* 132* 129* 125*   POTASSIUM mmol/L 5.3 4.5 3.9 4.3   CHLORIDE mmol/L 96 97 92* 88*   CO2 mmol/L 20* 27 24 24   ANION GAP mmol/L 12 8 13 13   BUN mg/dL 22 17 19 19   CREATININE mg/dL 1.13 1.23 1.29 1.24   EGFR ml/min/1.73sq m 69 62 59 62   CALCIUM mg/dL 8.6 8.6 8.3* 8.7   MAGNESIUM mg/dL 2.2 1.2*  --   --    PHOSPHORUS mg/dL  --  2.5  --   --      Results from last 7 days   Lab Units 11/19/23  0531 11/18/23  0859 11/17/23  1322   AST U/L 49* 81* 86*   ALT U/L 46 68* 70*   ALK PHOS U/L 64 72 74   TOTAL PROTEIN g/dL 6.7 7.1 7.3   ALBUMIN g/dL 3.7 4.0 4.2   TOTAL BILIRUBIN mg/dL 0.76 0.83 0.88     Results from last 7 days   Lab Units 11/18/23  2127 11/18/23  1611 11/18/23  1107 11/18/23  0708 11/17/23  2106 11/17/23  1844   POC GLUCOSE mg/dl 131 137 83 93 100 97     Results from last 7 days   Lab Units 11/19/23  0531 11/18/23  0859 11/17/23  1757 11/17/23  1322 GLUCOSE RANDOM mg/dL 136 140 103 95       Results from last 7 days   Lab Units 11/18/23  0020   OSMO UR mmol/     Results from last 7 days   Lab Units 11/18/23  0020   SODIUM UR  20     Results from last 7 days   Lab Units 11/17/23  1322   ETHANOL LVL mg/dL 409*         ED Treatment:   Medication Administration from 11/17/2023 1240 to 11/17/2023 1651         Date/Time Order Dose Route Action     11/17/2023 1326 EST sodium chloride 0.9 % bolus 1,000 mL 1,000 mL Intravenous New Bag     11/17/2023 1326 EST ondansetron (ZOFRAN) injection 4 mg 4 mg Intravenous Given     11/17/2023 1335 EST lidocaine (LIDODERM) 5 % patch 1 patch 1 patch Topical Medication Applied     11/17/2023 1413 EST iohexol (OMNIPAQUE) 350 MG/ML injection (MULTI-DOSE) 100 mL 100 mL Intravenous Given          Past Medical History:   Diagnosis Date    Acute on chronic kidney failure      Alcohol withdrawal ([address]) 06/07/2019    Atrial fibrillation (HCC)     Cancer (HCC)     prostate ca,had radiation    Cardiac disease     stents,then triple bypass    COPD (chronic obstructive pulmonary disease) ([address])     ETOH abuse     Hx of heart artery stent     2014    Hyperlipidemia     Hypertension     Hypovolemic shock ([address]) 12/22/2019    Lumbar spondylitis ([address]) 10/13/2022    Nasal bone fracture 10/10/2022    Prostate CA ([address])     S/P CABG x 3     2004    Sleep apnea      Present on Admission:   Alcohol intoxication ([address])   CAD (coronary artery disease)   Back pain   Chronic heart failure with preserved ejection fraction (HCC)   COPD (chronic obstructive pulmonary disease) ([facility])   Dyslipidemia   Essential hypertension   GERD (gastroesophageal reflux disease)   Paroxysmal atrial fibrillation with rapid ventricular response ([facility])   Stage 3a chronic kidney disease ([address])   Type 2 diabetes mellitus ([address])   Hyponatremia      Admitting Diagnosis: Alcohol intoxication ([address]) [F10.929]  Back pain [M54.9]  Hyponatremia [E87.1]  Rib fracture [S22.39XA]  Age/Sex: 63 y.o. male  Admission Orders:  Cont pulse ox  CIWA protocol  Scd/foot pumps  Seizure precautions  Acccuhecks  Aspiration precautions    Scheduled Medications:  acetaminophen, 650 mg, Oral, Q6H [address]  ferrous sulfate, 325 mg, Oral, Daily With Breakfast  fluticasone-vilanterol, 1 puff, Inhalation, Daily  folic acid, 1 mg, Oral, Daily  gabapentin, 300 mg, Oral, TID  insulin lispro, 1-5 Units, Subcutaneous, 4x Daily (AC & HS)  lidocaine, 1 patch, Topical, Daily  metoprolol tartrate, 25 mg, Oral, Q12H SEDA  multivitamin-minerals, 1 tablet, Oral, Daily  nicotine, 1 patch, Transdermal, Daily  pantoprazole, 40 mg, Intravenous, Q24H SEDA  pravastatin, 40 mg, Oral, Daily With Dinner  ranolazine, 500 mg, Oral, Q12H  tamsulosin, 0.4 mg, Oral, Daily  thiamine, 100 mg, Oral, Daily  varenicline, 1 mg, Oral, BID    Continuous IV Infusions:  sodium chloride, 50 mL/hr, Intravenous, Continuous    PRN Meds:  albuterol, 2 puff, Inhalation, Q4H PRN  albuterol, 2.5 mg, Nebulization, Q6H PRN  methocarbamol, 500 mg, Oral, Q6H PRN    Network Utilization Review Department  ATTENTION: Please call with any questions or concerns to [phone] and carefully listen to the prompts so that you are directed to the right person. All voicemails are confidential.   For Discharge needs, contact Care Management DC Support Team at [phone] opt. 2  Send all requests for admission clinical reviews, approved or denied determinations and any other requests to dedicated fax number below belonging to the campus where the patient is receiving treatment.  List of dedicated fax numbers for the Facilities:  [place] DENIALS (Administrative/Medical Necessity) [phone]   DISCHARGE SUPPORT TEAM (NETWORK) [id number]aryl Ortiz (Maternity/NICU/Pediatrics) [address] [place] [address] [address] [address] [facility] [address] [address] [facility] [phone]

## 2023-11-18 NOTE — PLAN OF CARE
Problem: PAIN - ADULT  Goal: Verbalizes/displays adequate comfort level or baseline comfort level  Description: Interventions:  - Encourage patient to monitor pain and request assistance  - Assess pain using appropriate pain scale  - Administer analgesics based on type and severity of pain and evaluate response  - Implement non-pharmacological measures as appropriate and evaluate response  - Consider cultural and social influences on pain and pain management  - Notify physician/advanced practitioner if interventions unsuccessful or patient reports new pain  Outcome: Progressing     Problem: SAFETY ADULT  Goal: Patient will remain free of falls  Description: INTERVENTIONS:  - Educate patient/family on patient safety including physical limitations  - Instruct patient to call for assistance with activity   - Consult OT/PT to assist with strengthening/mobility   - Keep Call bell within reach  - Keep bed low and locked with side rails adjusted as appropriate  - Keep care items and personal belongings within reach  - Initiate and maintain comfort rounds  - Make Fall Risk Sign visible to staff  - Offer Toileting every 2 Hours, in advance of need  - Initiate/Maintain bed alarm  - Obtain necessary fall risk management equipment: walker  - Apply yellow socks and bracelet for high fall risk patients  - Consider moving patient to room near nurses station  Outcome: Progressing  Goal: Maintain or return to baseline ADL function  Description: INTERVENTIONS:  -  Assess patient's ability to carry out ADLs; assess patient's baseline for ADL function and identify physical deficits which impact ability to perform ADLs (bathing, care of mouth/teeth, toileting, grooming, dressing, etc.)  - Assess/evaluate cause of self-care deficits   - Assess range of motion  - Assess patient's mobility; develop plan if impaired  - Assess patient's need for assistive devices and provide as appropriate  - Encourage maximum independence but intervene and supervise when necessary  - Involve family in performance of ADLs  - Assess for home care needs following discharge   - Consider OT consult to assist with ADL evaluation and planning for discharge  - Provide patient education as appropriate  Outcome: Progressing  Goal: Maintains/Returns to pre admission functional level  Description: INTERVENTIONS:  - Perform AM-PAC 6 Click Basic Mobility/ Daily Activity assessment daily.  - Set and communicate daily mobility goal to care team and patient/family/caregiver. - Collaborate with rehabilitation services on mobility goals if consulted    - Ambulate patient 3 times a day  - Out of bed to chair 3 times a day   - Out of bed for meals 3 times a day  - Out of bed for toileting  - Record patient progress and toleration of activity level   Outcome: Progressing     Problem: DISCHARGE PLANNING  Goal: Discharge to home or other facility with appropriate resources  Description: INTERVENTIONS:  - Identify barriers to discharge w/patient and caregiver  - Arrange for needed discharge resources and transportation as appropriate  - Identify discharge learning needs (meds, wound care, etc.)  - Arrange for interpretive services to assist at discharge as needed  - Refer to Case Management Department for coordinating discharge planning if the patient needs post-hospital services based on physician/advanced practitioner order or complex needs related to functional status, cognitive ability, or social support system  Outcome: Progressing     Problem: Knowledge Deficit  Goal: Patient/family/caregiver demonstrates understanding of disease process, treatment plan, medications, and discharge instructions  Description: Complete learning assessment and assess knowledge base.   Interventions:  - Provide teaching at level of understanding  - Provide teaching via preferred learning methods  Outcome: Progressing     Problem: RESPIRATORY - ADULT  Goal: Achieves optimal ventilation and oxygenation  Description: INTERVENTIONS:  - Assess for changes in respiratory status  - Assess for changes in mentation and behavior  - Position to facilitate oxygenation and minimize respiratory effort  - Oxygen administered by appropriate delivery if ordered  - Initiate smoking cessation education as indicated  - Encourage broncho-pulmonary hygiene including cough, deep breathe, Incentive Spirometry  - Assess the need for suctioning and aspirate as needed  - Assess and instruct to report SOB or any respiratory difficulty  - Respiratory Therapy support as indicated  Outcome: Progressing

## 2023-11-18 NOTE — PLAN OF CARE
Problem: SAFETY ADULT  Goal: Patient will remain free of falls  Description: INTERVENTIONS:  - Educate patient/family on patient safety including physical limitations  - Instruct patient to call for assistance with activity   - Consult OT/PT to assist with strengthening/mobility   - Keep Call bell within reach  - Keep bed low and locked with side rails adjusted as appropriate  - Keep care items and personal belongings within reach  - Initiate and maintain comfort rounds  - Make Fall Risk Sign visible to staff  - Offer Toileting every 2 Hours, in advance of need  - Initiate/Maintain bed alarm  - Obtain necessary fall risk management equipment: walker  - Apply yellow socks and bracelet for high fall risk patients  - Consider moving patient to room near nurses station  Outcome: Progressing     Problem: PAIN - ADULT  Goal: Verbalizes/displays adequate comfort level or baseline comfort level  Description: Interventions:  - Encourage patient to monitor pain and request assistance  - Assess pain using appropriate pain scale  - Administer analgesics based on type and severity of pain and evaluate response  - Implement non-pharmacological measures as appropriate and evaluate response  - Consider cultural and social influences on pain and pain management  - Notify physician/advanced practitioner if interventions unsuccessful or patient reports new pain  Outcome: Progressing     Problem: DISCHARGE PLANNING  Goal: Discharge to home or other facility with appropriate resources  Description: INTERVENTIONS:  - Identify barriers to discharge w/patient and caregiver  - Arrange for needed discharge resources and transportation as appropriate  - Identify discharge learning needs (meds, wound care, etc.)  - Arrange for interpretive services to assist at discharge as needed  - Refer to Case Management Department for coordinating discharge planning if the patient needs post-hospital services based on physician/advanced practitioner order or complex needs related to functional status, cognitive ability, or social support system  Outcome: Progressing     Problem: Knowledge Deficit  Goal: Patient/family/caregiver demonstrates understanding of disease process, treatment plan, medications, and discharge instructions  Description: Complete learning assessment and assess knowledge base.   Interventions:  - Provide teaching at level of understanding  - Provide teaching via preferred learning methods  Outcome: Progressing

## 2023-11-18 NOTE — PROGRESS NOTES
Daily Progress Note - [facility]  Family Medicine Residency  Jerry Holt 66 y.o. male MRN: [id number]  Unit/Bed#: [facility] [address] Encounter: [id number]  Admitting Physician: Jihan Martin DO  PCP: Pita Sebastian MD  Date of Admission:  11/17/2023 12:42 PM    Summary:     IVFs:      Diet: Diet Vlad/CHO Controlled; Consistent Carbohydrate Diet Level 2 (5 carb servings/75 grams CHO/meal); Fluid Restriction 1800 ML  VTE:  Pharmacologic VTE Prophylaxis contraindicated due to concern for melena  . Mechanical prophylaxis in place. Patient Centered Rounds: I have performed bedside rounds with nursing staff today. Specialists/Other Care Team Provider: none    LOS: 0 day(s)  Status: Inpatient   Disposition: The patient will continue to require additional inpatient hospital stay due to requirement of IV fluids  Code Status: Level 1 - Full Code      Assessment and Plan    Hyponatremia  Assessment & Plan  IMPROVED. Acute on chronic, likely in setting of chronic alcohol use and poor PO intake. Na 125 on admission. Urine sodium 20  Urine osm 288    Plan:  S/p NS IVF bolus in ED   I/O's   Increase to 1800 cc fluid restriction     GERD (gastroesophageal reflux disease)  Assessment & Plan  Patient with hx of upper GI bleed and reporting dark stools since weeks. Not actively bleeding. Hb stable POA    Protonix 40mg IV       Paroxysmal atrial fibrillation with rapid ventricular response (HCC)  Assessment & Plan  Chronic, stable. Plan:  Continue home Eliquis 5 mg BID  Continue lopressor 25mg BID     Stage 3a chronic kidney disease [facility])  Assessment & Plan  Lab Results   Component Value Date    EGFR 62 11/18/2023    EGFR 59 11/17/2023    EGFR 62 11/17/2023    CREATININE 1.23 11/18/2023    CREATININE 1.29 11/17/2023    CREATININE 1.24 11/17/2023     Stable, at baseline Cr of 1.1-1.3.     Plan:  - I/O's  - Avoid nephrotoxins  - Renally dose medications    Chronic heart failure with preserved ejection fraction [facility])  Assessment & Plan  Wt Readings from Last 3 Encounters:   11/17/23 90.9 kg (200 lb 4.8 oz)   11/03/23 94.3 kg (208 lb)   10/30/23 92.5 kg (204 lb)     Echo 9/2023 - EF 94% moderate diastolic dysfunction     Gentle fluid hydration   I/O's   Monitor weights and V/S           Back pain  Assessment & Plan  Patient complaining of back pain after falling on a hardwood floor while intoxicated 4 days today. Pain worsened and increasing with coughing and movement, hence patient presented to the ED on 11/17. CT C/A/P: Acute, mildly displaced fractures of the right 10th and 11th ribs posteriorly. Tylenol 975 mg Q8H scheduled   Robaxin Q6H scheduled   Lidoderm patch     CAD (coronary artery disease)  Assessment & Plan  Patient with hx of CAD s/p CABG. Hold  home eliquis 5mg BID and aspirin 81mg due to reported melena. No BM in 4 days     Dyslipidemia  Assessment & Plan  Chronic. Continue Pravastatin 40 mg daily     Type 2 diabetes mellitus ([address])  Assessment & Plan  Lab Results   Component Value Date    HGBA1C 6.1 (H) 09/20/2023     ISS   Monitor for hypoglycemia      COPD (chronic obstructive pulmonary disease) ([address])  Assessment & Plan  Patient has SOB at baseline, not on home oxygen. Currently smoking 1.5 packs/day     Continue home inhalers   Nicotine patch   Continue chantix     * Alcohol intoxication ([address])  Assessment & Plan  Patient presenting with back pain after sustaining a fall 4 days ago while he was intoxicated. Patient states that he has been drinking  a quart of vodka+ice tea/day non-stop since the past 4 days. Patient quit drinking for 2 months however, restarted drinking a month ago because he ran out of naltrexone. CIWA at the time of presentation 24     CIWA protocol   PO Ativan x1  Seizure precautions   Thiamine, folate and multivitamins           Subjective:   Patient evaluated at bedside and reports persistent back pain. Initially refused Tylenol and Robaxin, currently agreeable. Noticing some tremors at rest, given Ativan x1. Denies any nausea, vomiting, weakness. Objective     Objective:   Vitals:   Temp (24hrs), Av.5 °F (37.5 °C), Min:98.6 °F (37 °C), Max:100.7 °F (38.2 °C)    Temp:  [98.6 °F (37 °C)-100.7 °F (38.2 °C)] 100.7 °F (38.2 °C)  HR:  [] 101  Resp:  [16-19] 18  BP: ()/() 97/57  SpO2:  [87 %-94 %] 87 %  Body mass index is 24.37 kg/m². Input and Output Summary (last 24 hours): Intake/Output Summary (Last 24 hours) at 2023 1618  Last data filed at 2023 0900  Gross per 24 hour   Intake 180 ml   Output 1300 ml   Net -1120 ml       Physical Exam:   Physical Exam  Constitutional:       General: He is not in acute distress. HENT:      Head: Normocephalic and atraumatic. Eyes:      Conjunctiva/sclera: Conjunctivae normal.   Cardiovascular:      Rate and Rhythm: Normal rate and regular rhythm. Pulses: Normal pulses. Heart sounds: Normal heart sounds. No murmur heard. Pulmonary:      Effort: Pulmonary effort is normal. No respiratory distress. Breath sounds: No wheezing. Abdominal:      General: Bowel sounds are normal.      Palpations: Abdomen is soft. Tenderness: There is no abdominal tenderness. There is no guarding. Musculoskeletal:         General: Tenderness (Right mid back, bruising) present. No swelling. Right lower leg: No edema. Left lower leg: No edema. Skin:     Findings: Bruising and erythema present. Neurological:      Mental Status: He is alert.            Additional Data:     Labs:  Results from last 7 days   Lab Units 23  0859 23  1322   WBC Thousand/uL 5.77 7.38   HEMOGLOBIN g/dL 13.5 13.6   HEMATOCRIT % 38.9 38.2   PLATELETS Thousands/uL 85* 92*   NEUTROS PCT %  --  74   LYMPHS PCT %  --  16   LYMPHO PCT % 17  --    MONOS PCT %  --  9   MONO PCT % 12  --    EOS PCT % 0 0     Results from last 7 days   Lab Units 23  0859   POTASSIUM mmol/L 4.5   CHLORIDE mmol/L 97 CO2 mmol/L 27   BUN mg/dL 17   CREATININE mg/dL 1.23   CALCIUM mg/dL 8.6   ALK PHOS U/L 72   ALT U/L 68*   AST U/L 81*         Results from last 7 days   Lab Units 11/18/23  1107 11/18/23  0708 11/17/23  2106 11/17/23  1844   POC GLUCOSE mg/dl 83 93 100 97           * I Have Reviewed All Lab Data Listed Above. * Additional Pertinent Lab Tests Reviewed:  All Labs Within Last 24 Hours Reviewed    Imaging:    Imaging Reports Reviewed Today Include: none   Imaging Personally Reviewed by Myself Includes:  none    Recent Cultures (last 7 days):           Last 24 Hours Medication List:   Current Facility-Administered Medications   Medication Dose Route Frequency Provider Last Rate    acetaminophen  650 mg Oral Once Adán Rubin, DO      acetaminophen  975 mg Oral [facility] Adán Rubin,       albuterol  2 puff Inhalation Q4H PRN Calin Mi MD      albuterol  2.5 mg Nebulization Q6H PRN Calin Mi MD      apixaban  5 mg Oral BID Adán Rubin DO      ferrous sulfate  325 mg Oral Daily With Breakfast Calin Mi MD      fluticasone-vilanterol  1 puff Inhalation Daily Calin Mi MD      folic acid  1 mg Oral Daily Calin Mi MD      gabapentin  300 mg Oral TID Calin Mi MD      insulin lispro  1-5 Units Subcutaneous 4x Daily (AC & HS) Calin Mi MD      lidocaine  1 patch Topical Daily Calin Mi MD      methocarbamol  500 mg Oral Q6H [address] Adán Rubin,       metoprolol tartrate  25 mg Oral Q12H [address] Calin Mi MD      multivitamin-minerals  1 tablet Oral Daily Calin Mi MD      nicotine  1 patch Transdermal Daily Calin Mi MD      pantoprazole  40 mg Intravenous Q24H [address] Calin Mi MD      pravastatin  40 mg Oral Daily With Dianna Moreno MD      ranolazine  500 mg Oral Q12H Calin Mi MD      tamsulosin  0.4 mg Oral Daily Calin Mi MD      thiamine  100 mg Oral Daily Calin Mi MD varenicline  1 mg Oral BID Brittnee Mccormick MD                 Patient Information Sharing: With the patient     Time Spent for Care: 45 minutes. More than 50% of total time spent on counseling and coordination of care as described above. ** Please Note: Dictation voice to text software may have been used in the creation of this document.  **    Zach Reza DO  11/18/23  4:18 PM

## 2023-11-18 NOTE — ASSESSMENT & PLAN NOTE
Patient reporting melena at home, has not had a BM in 4-5 days before admission. Received Miralax 17g PO QD and reports normal bowel movements.

## 2023-11-19 LAB
ALBUMIN SERPL BCP-MCNC: 3.7 G/DL (ref 3.5–5)
ALP SERPL-CCNC: 64 U/L (ref 34–104)
ALT SERPL W P-5'-P-CCNC: 46 U/L (ref 7–52)
ANION GAP SERPL CALCULATED.3IONS-SCNC: 12 MMOL/L
ANION GAP SERPL CALCULATED.3IONS-SCNC: 6 MMOL/L
ANION GAP SERPL CALCULATED.3IONS-SCNC: 8 MMOL/L
AST SERPL W P-5'-P-CCNC: 49 U/L (ref 13–39)
BASOPHILS # BLD AUTO: 0.05 THOUSANDS/ÂΜL (ref 0–0.1)
BASOPHILS NFR BLD AUTO: 1 % (ref 0–1)
BILIRUB SERPL-MCNC: 0.76 MG/DL (ref 0.2–1)
BUN SERPL-MCNC: 21 MG/DL (ref 5–25)
BUN SERPL-MCNC: 21 MG/DL (ref 5–25)
BUN SERPL-MCNC: 22 MG/DL (ref 5–25)
CALCIUM SERPL-MCNC: 8.5 MG/DL (ref 8.4–10.2)
CALCIUM SERPL-MCNC: 8.6 MG/DL (ref 8.4–10.2)
CALCIUM SERPL-MCNC: 8.7 MG/DL (ref 8.4–10.2)
CHLORIDE SERPL-SCNC: 94 MMOL/L (ref 96–108)
CHLORIDE SERPL-SCNC: 94 MMOL/L (ref 96–108)
CHLORIDE SERPL-SCNC: 96 MMOL/L (ref 96–108)
CO2 SERPL-SCNC: 20 MMOL/L (ref 21–32)
CO2 SERPL-SCNC: 25 MMOL/L (ref 21–32)
CO2 SERPL-SCNC: 26 MMOL/L (ref 21–32)
CREAT SERPL-MCNC: 1.13 MG/DL (ref 0.6–1.3)
CREAT SERPL-MCNC: 1.24 MG/DL (ref 0.6–1.3)
CREAT SERPL-MCNC: 1.37 MG/DL (ref 0.6–1.3)
EOSINOPHIL # BLD AUTO: 0.13 THOUSAND/ÂΜL (ref 0–0.61)
EOSINOPHIL NFR BLD AUTO: 3 % (ref 0–6)
ERYTHROCYTE [DISTWIDTH] IN BLOOD BY AUTOMATED COUNT: 14.3 % (ref 11.6–15.1)
GFR SERPL CREATININE-BSD FRML MDRD: 55 ML/MIN/1.73SQ M
GFR SERPL CREATININE-BSD FRML MDRD: 62 ML/MIN/1.73SQ M
GFR SERPL CREATININE-BSD FRML MDRD: 69 ML/MIN/1.73SQ M
GLUCOSE SERPL-MCNC: 104 MG/DL (ref 65–140)
GLUCOSE SERPL-MCNC: 106 MG/DL (ref 65–140)
GLUCOSE SERPL-MCNC: 127 MG/DL (ref 65–140)
GLUCOSE SERPL-MCNC: 128 MG/DL (ref 65–140)
GLUCOSE SERPL-MCNC: 135 MG/DL (ref 65–140)
GLUCOSE SERPL-MCNC: 136 MG/DL (ref 65–140)
GLUCOSE SERPL-MCNC: 151 MG/DL (ref 65–140)
HCT VFR BLD AUTO: 41.1 % (ref 36.5–49.3)
HGB BLD-MCNC: 14 G/DL (ref 12–17)
IMM GRANULOCYTES # BLD AUTO: 0.02 THOUSAND/UL (ref 0–0.2)
IMM GRANULOCYTES NFR BLD AUTO: 0 % (ref 0–2)
LYMPHOCYTES # BLD AUTO: 0.96 THOUSANDS/ÂΜL (ref 0.6–4.47)
LYMPHOCYTES NFR BLD AUTO: 21 % (ref 14–44)
MACROCYTES BLD QL AUTO: PRESENT
MAGNESIUM SERPL-MCNC: 2.2 MG/DL (ref 1.9–2.7)
MCH RBC QN AUTO: 34.5 PG (ref 26.8–34.3)
MCHC RBC AUTO-ENTMCNC: 34.1 G/DL (ref 31.4–37.4)
MCV RBC AUTO: 101 FL (ref 82–98)
MONOCYTES # BLD AUTO: 0.54 THOUSAND/ÂΜL (ref 0.17–1.22)
MONOCYTES NFR BLD AUTO: 12 % (ref 4–12)
NEUTROPHILS # BLD AUTO: 2.82 THOUSANDS/ÂΜL (ref 1.85–7.62)
NEUTS SEG NFR BLD AUTO: 63 % (ref 43–75)
NRBC BLD AUTO-RTO: 0 /100 WBCS
PLATELET # BLD AUTO: 37 THOUSANDS/UL (ref 149–390)
PLATELET BLD QL SMEAR: ABNORMAL
PMV BLD AUTO: 11.3 FL (ref 8.9–12.7)
POTASSIUM SERPL-SCNC: 4.4 MMOL/L (ref 3.5–5.3)
POTASSIUM SERPL-SCNC: 4.6 MMOL/L (ref 3.5–5.3)
POTASSIUM SERPL-SCNC: 5.3 MMOL/L (ref 3.5–5.3)
PROT SERPL-MCNC: 6.7 G/DL (ref 6.4–8.4)
RBC # BLD AUTO: 4.06 MILLION/UL (ref 3.88–5.62)
RBC MORPH BLD: PRESENT
SODIUM SERPL-SCNC: 126 MMOL/L (ref 135–147)
SODIUM SERPL-SCNC: 127 MMOL/L (ref 135–147)
SODIUM SERPL-SCNC: 128 MMOL/L (ref 135–147)
WBC # BLD AUTO: 4.52 THOUSAND/UL (ref 4.31–10.16)

## 2023-11-19 PROCEDURE — 82948 REAGENT STRIP/BLOOD GLUCOSE: CPT

## 2023-11-19 PROCEDURE — 83735 ASSAY OF MAGNESIUM: CPT

## 2023-11-19 PROCEDURE — C9113 INJ PANTOPRAZOLE SODIUM, VIA: HCPCS

## 2023-11-19 PROCEDURE — 80048 BASIC METABOLIC PNL TOTAL CA: CPT

## 2023-11-19 PROCEDURE — 99233 SBSQ HOSP IP/OBS HIGH 50: CPT | Performed by: FAMILY MEDICINE

## 2023-11-19 PROCEDURE — 80053 COMPREHEN METABOLIC PANEL: CPT

## 2023-11-19 PROCEDURE — 85025 COMPLETE CBC W/AUTO DIFF WBC: CPT

## 2023-11-19 RX ORDER — LORAZEPAM 1 MG/1
2 TABLET ORAL ONCE
Status: COMPLETED | OUTPATIENT
Start: 2023-11-19 | End: 2023-11-19

## 2023-11-19 RX ORDER — SODIUM CHLORIDE 1 G/1
1 TABLET ORAL
Status: DISCONTINUED | OUTPATIENT
Start: 2023-11-19 | End: 2023-11-19

## 2023-11-19 RX ORDER — SODIUM CHLORIDE 9 MG/ML
50 INJECTION, SOLUTION INTRAVENOUS CONTINUOUS
Status: DISCONTINUED | OUTPATIENT
Start: 2023-11-19 | End: 2023-11-19

## 2023-11-19 RX ORDER — SODIUM CHLORIDE 1 G/1
2 TABLET ORAL ONCE
Status: COMPLETED | OUTPATIENT
Start: 2023-11-19 | End: 2023-11-19

## 2023-11-19 RX ORDER — TRAMADOL HYDROCHLORIDE 50 MG/1
50 TABLET ORAL EVERY 6 HOURS PRN
Status: DISCONTINUED | OUTPATIENT
Start: 2023-11-19 | End: 2023-11-21 | Stop reason: HOSPADM

## 2023-11-19 RX ADMIN — GABAPENTIN 300 MG: 300 CAPSULE ORAL at 16:27

## 2023-11-19 RX ADMIN — GABAPENTIN 300 MG: 300 CAPSULE ORAL at 09:41

## 2023-11-19 RX ADMIN — POLYETHYLENE GLYCOL 3350 17 G: 17 POWDER, FOR SOLUTION ORAL at 09:40

## 2023-11-19 RX ADMIN — LIDOCAINE 1 PATCH: 50 PATCH CUTANEOUS at 15:50

## 2023-11-19 RX ADMIN — FLUTICASONE FUROATE AND VILANTEROL TRIFENATATE 1 PUFF: 200; 25 POWDER RESPIRATORY (INHALATION) at 09:42

## 2023-11-19 RX ADMIN — ACETAMINOPHEN 975 MG: 325 TABLET ORAL at 06:04

## 2023-11-19 RX ADMIN — PRAVASTATIN SODIUM 40 MG: 40 TABLET ORAL at 16:27

## 2023-11-19 RX ADMIN — NICOTINE 1 PATCH: 21 PATCH, EXTENDED RELEASE TRANSDERMAL at 09:42

## 2023-11-19 RX ADMIN — THIAMINE HCL TAB 100 MG 100 MG: 100 TAB at 09:41

## 2023-11-19 RX ADMIN — LORAZEPAM 2 MG: 1 TABLET ORAL at 21:51

## 2023-11-19 RX ADMIN — LORAZEPAM 2 MG: 1 TABLET ORAL at 06:31

## 2023-11-19 RX ADMIN — TRAMADOL HYDROCHLORIDE 50 MG: 50 TABLET, COATED ORAL at 20:23

## 2023-11-19 RX ADMIN — Medication 1 TABLET: at 09:41

## 2023-11-19 RX ADMIN — SODIUM CHLORIDE 1 G: 1 TABLET ORAL at 20:22

## 2023-11-19 RX ADMIN — METHOCARBAMOL 500 MG: 500 TABLET ORAL at 00:19

## 2023-11-19 RX ADMIN — RANOLAZINE 500 MG: 500 TABLET, FILM COATED, EXTENDED RELEASE ORAL at 06:05

## 2023-11-19 RX ADMIN — GABAPENTIN 300 MG: 300 CAPSULE ORAL at 20:22

## 2023-11-19 RX ADMIN — METHOCARBAMOL 500 MG: 500 TABLET ORAL at 06:05

## 2023-11-19 RX ADMIN — TAMSULOSIN HYDROCHLORIDE 0.4 MG: 0.4 CAPSULE ORAL at 09:42

## 2023-11-19 RX ADMIN — FOLIC ACID 1 MG: 1 TABLET ORAL at 09:42

## 2023-11-19 RX ADMIN — METOPROLOL TARTRATE 25 MG: 25 TABLET, FILM COATED ORAL at 20:22

## 2023-11-19 RX ADMIN — METHOCARBAMOL 500 MG: 500 TABLET ORAL at 18:18

## 2023-11-19 RX ADMIN — ACETAMINOPHEN 975 MG: 325 TABLET ORAL at 13:07

## 2023-11-19 RX ADMIN — PANTOPRAZOLE SODIUM 40 MG: 40 INJECTION, POWDER, FOR SOLUTION INTRAVENOUS at 06:05

## 2023-11-19 RX ADMIN — RANOLAZINE 500 MG: 500 TABLET, FILM COATED, EXTENDED RELEASE ORAL at 18:18

## 2023-11-19 RX ADMIN — INSULIN LISPRO 1 UNITS: 100 INJECTION, SOLUTION INTRAVENOUS; SUBCUTANEOUS at 21:38

## 2023-11-19 RX ADMIN — METHOCARBAMOL 500 MG: 500 TABLET ORAL at 13:07

## 2023-11-19 RX ADMIN — SODIUM CHLORIDE 75 ML/HR: 0.9 INJECTION, SOLUTION INTRAVENOUS at 07:49

## 2023-11-19 RX ADMIN — SODIUM CHLORIDE 2 G: 1 TABLET ORAL at 16:27

## 2023-11-19 RX ADMIN — METOPROLOL TARTRATE 25 MG: 25 TABLET, FILM COATED ORAL at 09:41

## 2023-11-19 RX ADMIN — LIDOCAINE 1 PATCH: 50 PATCH CUTANEOUS at 09:41

## 2023-11-19 RX ADMIN — LORAZEPAM 2 MG: 1 TABLET ORAL at 13:37

## 2023-11-19 RX ADMIN — FERROUS SULFATE TAB 325 MG (65 MG ELEMENTAL FE) 325 MG: 325 (65 FE) TAB at 09:41

## 2023-11-19 RX ADMIN — ACETAMINOPHEN 975 MG: 325 TABLET ORAL at 21:37

## 2023-11-19 NOTE — PLAN OF CARE
Problem: PAIN - ADULT  Goal: Verbalizes/displays adequate comfort level or baseline comfort level  Description: Interventions:  - Encourage patient to monitor pain and request assistance  - Assess pain using appropriate pain scale  - Administer analgesics based on type and severity of pain and evaluate response  - Implement non-pharmacological measures as appropriate and evaluate response  - Consider cultural and social influences on pain and pain management  - Notify physician/advanced practitioner if interventions unsuccessful or patient reports new pain  Outcome: Progressing     Problem: SAFETY ADULT  Goal: Patient will remain free of falls  Description: INTERVENTIONS:  - Educate patient/family on patient safety including physical limitations  - Instruct patient to call for assistance with activity   - Consult OT/PT to assist with strengthening/mobility   - Keep Call bell within reach  - Keep bed low and locked with side rails adjusted as appropriate  - Keep care items and personal belongings within reach  - Initiate and maintain comfort rounds  - Make Fall Risk Sign visible to staff  - Offer Toileting every 2 Hours, in advance of need  - Initiate/Maintain bed alarm  - Obtain necessary fall risk management equipment: walker  - Apply yellow socks and bracelet for high fall risk patients  - Consider moving patient to room near nurses station  Outcome: Progressing     Problem: RESPIRATORY - ADULT  Goal: Achieves optimal ventilation and oxygenation  Description: INTERVENTIONS:  - Assess for changes in respiratory status  - Assess for changes in mentation and behavior  - Position to facilitate oxygenation and minimize respiratory effort  - Oxygen administered by appropriate delivery if ordered  - Initiate smoking cessation education as indicated  - Encourage broncho-pulmonary hygiene including cough, deep breathe, Incentive Spirometry  - Assess the need for suctioning and aspirate as needed  - Assess and instruct to report SOB or any respiratory difficulty  - Respiratory Therapy support as indicated  Outcome: Progressing

## 2023-11-19 NOTE — PROGRESS NOTES
Daily Progress Note - [facility]  Family Medicine Residency  Alex David 66 y.o. male MRN: [id number]  Unit/Bed#: [address] Encounter: [id number]  Admitting Physician: Indiana Blank DO   PCP: Clary Joseph MD  Date of Admission:  11/17/2023 12:42 PM    Assessment and Plan    * Alcohol intoxication [facility])  Assessment & Plan  Patient presenting with back pain after sustaining a fall 4 days ago while he was intoxicated. Patient states that he has been drinking  a quart of vodka+ice tea/day non-stop since the past 4 days. Patient quit drinking for 2 months however, restarted drinking a month ago because he ran out of naltrexone. CIWA at the time of presentation 24     Patient is still feeling shaky and tremulous; tremors well controlled with Ativan    CIWA protocol   PO Ativan x1  Seizure precautions   Thiamine, folate and multivitamins     Hyponatremia  Assessment & Plan  Na 125 on admission, improved to 132. Today Na is 128. Acute on chronic, likely in setting of chronic alcohol use and poor PO intake. Urine sodium 20  Urine osm 288    Plan:  S/p NS IVF bolus in ED   I/O's   Started patient on sodium chloride infusion 75 ml/hr and then reduced to 50 ml/hr; repeat BMP in 4 hours  Patient on 1500 ML fluid restriction    COPD (chronic obstructive pulmonary disease) ([address])  Assessment & Plan  Patient has SOB at baseline, not on home oxygen. Currently smoking 1.5 packs/day   Today his shortness of breath has improved      Continue home inhalers   Nicotine patch   Continue chantix     Back pain  Assessment & Plan  Patient complaining of back pain after falling on a hardwood floor while intoxicated 4 days today. Pain worsened and increasing with coughing and movement, hence patient presented to the ED on 11/17. C/A/P 11/17/23: Acute, mildly displaced fractures of the right 10th and 11th ribs posteriorly.    Back pain is still present today    Tylenol 975 mg Q8H scheduled   Robaxin Q6H scheduled   Continue with Lidoderm patch   Started patient on PO Tramadol 50 mg every 6 hours PRN for moderate to severe pain  Patient on abdominal binder    Stage 3a chronic kidney disease [facility])  Assessment & Plan  Lab Results   Component Value Date    EGFR 69 11/19/2023    EGFR 62 11/18/2023    EGFR 59 11/17/2023    CREATININE 1.13 11/19/2023    CREATININE 1.23 11/18/2023    CREATININE 1.29 11/17/2023     Stable, at baseline Cr of 1.1-1.3. Creatinine has improved to 1.13 today. Plan:  - I/O's  - Avoid nephrotoxins  - Renally dose medications    Chronic heart failure with preserved ejection fraction [facility])  Assessment & Plan  Wt Readings from Last 3 Encounters:   11/17/23 90.9 kg (200 lb 4.8 oz)   11/03/23 94.3 kg (208 lb)   10/30/23 92.5 kg (204 lb)     Echo 9/2023 - EF 54% moderate diastolic dysfunction     Gentle fluid hydration   Input and output monitoring  Monitor weights   Monitor vital signs          CAD (coronary artery disease)  Assessment & Plan  Patient with hx of CAD s/p CABG. Patient had some melena previously for which he was seen on prior admission  Eliquis 5 mg BID was first held due to reported melena    Restarted Eliquis as hemoglobin continues to be stable. Monitor for any recurrence of bleeding or melena   Platelet count dropped to 37, so eliquis being held today  Monitor for any recurrence of bleeding or melena    Constipation  Assessment & Plan  Patient reporting melena at home, has not had a BM in 4-5 days before admission. Patient had a bowel movement yesterday    Plan:  -Start miralax 17 g PO daily    GERD (gastroesophageal reflux disease)  Assessment & Plan  Patient has  hx of upper GI bleed and reporting dark stools since weeks. No active bleeding. Hb stable POA  Hemoglobin today is 14    Protonix 40mg IV       Paroxysmal atrial fibrillation with rapid ventricular response (HCC)  Assessment & Plan  Chronic, stable.     Plan:  Continue home Eliquis 5 mg BID  Continue lopressor 25mg BID     Dyslipidemia  Assessment & Plan  Chronic. Continue Pravastatin 40 mg daily      Type 2 diabetes mellitus [facility])  Assessment & Plan  Lab Results   Component Value Date    HGBA1C 6.1 (H) 2023     ISS   Monitor for hypoglycemia          VTE Pharmacologic Prophylaxis: VTE Score: 6 Moderate Risk (Score 3-4) - Pharmacological DVT Prophylaxis Contraindicated. Sequential Compression Devices Ordered. Patient Centered Rounds: I have performed bedside rounds with nursing staff today. Discussions with Specialists or Other Care Team Provider: Yes    Education and Discussions with Family / Patient: Yes  Patient Information Sharing: With the consent of Rosaline Parent , their loved ones were notified today by inpatient team of the patient’s condition and current plan. All questions answered. Time Spent for Care: 30 minutes. More than 50% of total time spent on counseling and coordination of care as described above. Current Length of Stay: 1 day(s)    Current Patient Status: Inpatient   Certification Statement: The patient will continue to require additional inpatient hospital stay due to persistent  back pain and hyponatremia. Code Status: Level 1 - Full Code    Subjective:   Patient was seen and examined at the bedside. He still complains of back pain he states that his back pain has not improved. He has some tremors and shakiness at rest; last given Ativan at 6:30 AM.  He denies any nausea, vomiting, weakness. Objective     Objective:   Vitals:   Temp (24hrs), Av.4 °F (36.9 °C), Min:97.1 °F (36.2 °C), Max:100.7 °F (38.2 °C)    Temp:  [97.1 °F (36.2 °C)-100.7 °F (38.2 °C)] 97.4 °F (36.3 °C)  HR:  [] 88  Resp:  [16-18] 16  BP: ()/(55-99) 150/99  SpO2:  [87 %-96 %] 92 %  Body mass index is 24.37 kg/m². Input and Output Summary (last 24 hours):        Intake/Output Summary (Last 24 hours) at 2023 1213  Last data filed at 2023 0900  Gross per 24 hour   Intake 420 ml   Output 800 ml   Net -380 ml       Physical Exam:   Physical Exam  Constitutional:       Appearance: Normal appearance. He is not toxic-appearing or diaphoretic. Cardiovascular:      Rate and Rhythm: Normal rate and regular rhythm. Pulses: Normal pulses. Heart sounds: Normal heart sounds. No murmur heard. No friction rub. No gallop. Pulmonary:      Effort: Pulmonary effort is normal. No respiratory distress. Breath sounds: Normal breath sounds. No stridor. No wheezing, rhonchi or rales. Chest:      Chest wall: No tenderness. Abdominal:      General: Bowel sounds are normal. There is no distension. Palpations: Abdomen is soft. There is no mass. Tenderness: There is no abdominal tenderness. There is no right CVA tenderness, left CVA tenderness, guarding or rebound. Hernia: No hernia is present. Musculoskeletal:         General: Tenderness present. No swelling, deformity or signs of injury. Normal range of motion. Right lower leg: No edema. Left lower leg: No edema. Comments: Tenderness in right mid back   Skin:     General: Skin is warm. Capillary Refill: Capillary refill takes less than 2 seconds. Coloration: Skin is not jaundiced or pale. Findings: Bruising present. No erythema, lesion or rash. Neurological:      General: No focal deficit present. Mental Status: He is alert and oriented to person, place, and time. Mental status is at baseline.    Psychiatric:         Mood and Affect: Mood normal.           Additional Data:     Labs:  Results from last 7 days   Lab Units 11/19/23  0531   WBC Thousand/uL 4.52   HEMOGLOBIN g/dL 14.0   HEMATOCRIT % 41.1   PLATELETS Thousands/uL 37*   NEUTROS PCT % 63   LYMPHS PCT % 21   MONOS PCT % 12   EOS PCT % 3     Results from last 7 days   Lab Units 11/19/23  0531   POTASSIUM mmol/L 5.3   CHLORIDE mmol/L 96   CO2 mmol/L 20*   BUN mg/dL 22   CREATININE mg/dL 1.13   CALCIUM mg/dL 8.6   ALK PHOS U/L 64   ALT U/L 46   AST U/L 49*         Results from last 7 days   Lab Units 11/19/23  1113 11/19/23  0721 11/18/23  2127 11/18/23  1611 11/18/23  1107   POC GLUCOSE mg/dl 104 106 131 137 83           * I Have Reviewed All Lab Data Listed Above. * Additional Pertinent Lab Tests Reviewed:  All Labs Within Last 24 Hours Reviewed    Imaging:    Imaging Reports Reviewed Today Include: CT head without contrast  Imaging Personally Reviewed by Myself Includes:      Recent Cultures (last 7 days):           Last 24 Hours Medication List:   Current Facility-Administered Medications   Medication Dose Route Frequency Provider Last Rate    acetaminophen  975 mg Oral [facility] Carson Chua DO      albuterol  2 puff Inhalation Q4H PRN Opal Tucker MD      albuterol  2.5 mg Nebulization Q6H PRN Opal Tucker MD      ferrous sulfate  325 mg Oral Daily With Breakfast Opal Tucker MD      fluticasone-vilanterol  1 puff Inhalation Daily Opal Tucker MD      folic acid  1 mg Oral Daily Opal Tucker MD      gabapentin  300 mg Oral TID Opal Tucker MD      insulin lispro  1-5 Units Subcutaneous 4x Daily (AC & HS) Opal Tucker MD      lidocaine  1 patch Topical Daily Opal Tucker MD      methocarbamol  500 mg Oral Q6H [address] Carson Chua, DO      metoprolol tartrate  25 mg Oral Q12H [address] Opal Tucker MD      multivitamin-minerals  1 tablet Oral Daily Opal Tucker MD      nicotine  1 patch Transdermal Daily Opal Tucker MD      pantoprazole  40 mg Intravenous Q24H [address] Opal Tucker MD      polyethylene glycol  17 g Oral Daily Carson Chua DO      pravastatin  40 mg Oral Daily With Boo Monahan MD      ranolazine  500 mg Oral Q12H Opal Tucker MD      sodium chloride  50 mL/hr Intravenous Continuous Burt Mathis MD 75 mL/hr (11/19/23 0749)    tamsulosin  0.4 mg Oral Daily Opal Tucker MD      thiamine  100 mg Oral Daily Opal Tucker MD traMADol  50 mg Oral Q6H PRN Brandy Connors DO      varenicline  1 mg Oral BID Mendoza Jackson MD               ** Please Note: Dictation voice to text software may have been used in the creation of this document.  **    [address] [facility] [facility], [place]  11/19/23  12:13 PM Z Plasty Text: In order to reduce appearance and discomfort related to the web, a Z-plasty was designed.  Aftert prep and anesthesia, a horizontal incision was made across the web.  A double transposition flap was incised in a Z-plasty fashion.  The wound margins were widely undermined to elevate two flaps of skin.  After hemostasis was obtained, the flaps were carried over into place, and sutured in a a layered fashion.

## 2023-11-20 LAB
ALBUMIN SERPL BCP-MCNC: 3.9 G/DL (ref 3.5–5)
ALP SERPL-CCNC: 59 U/L (ref 34–104)
ALT SERPL W P-5'-P-CCNC: 37 U/L (ref 7–52)
ANION GAP SERPL CALCULATED.3IONS-SCNC: 6 MMOL/L
ANION GAP SERPL CALCULATED.3IONS-SCNC: 7 MMOL/L
AST SERPL W P-5'-P-CCNC: 32 U/L (ref 13–39)
ATRIAL RATE: 109 BPM
BILIRUB SERPL-MCNC: 0.62 MG/DL (ref 0.2–1)
BUN SERPL-MCNC: 20 MG/DL (ref 5–25)
BUN SERPL-MCNC: 21 MG/DL (ref 5–25)
CALCIUM SERPL-MCNC: 8.6 MG/DL (ref 8.4–10.2)
CALCIUM SERPL-MCNC: 9.2 MG/DL (ref 8.4–10.2)
CHLORIDE SERPL-SCNC: 95 MMOL/L (ref 96–108)
CHLORIDE SERPL-SCNC: 98 MMOL/L (ref 96–108)
CO2 SERPL-SCNC: 25 MMOL/L (ref 21–32)
CO2 SERPL-SCNC: 27 MMOL/L (ref 21–32)
CREAT SERPL-MCNC: 1.01 MG/DL (ref 0.6–1.3)
CREAT SERPL-MCNC: 1.14 MG/DL (ref 0.6–1.3)
ERYTHROCYTE [DISTWIDTH] IN BLOOD BY AUTOMATED COUNT: 13.7 % (ref 11.6–15.1)
GFR SERPL CREATININE-BSD FRML MDRD: 69 ML/MIN/1.73SQ M
GFR SERPL CREATININE-BSD FRML MDRD: 79 ML/MIN/1.73SQ M
GLUCOSE SERPL-MCNC: 108 MG/DL (ref 65–140)
GLUCOSE SERPL-MCNC: 126 MG/DL (ref 65–140)
GLUCOSE SERPL-MCNC: 130 MG/DL (ref 65–140)
GLUCOSE SERPL-MCNC: 153 MG/DL (ref 65–140)
GLUCOSE SERPL-MCNC: 157 MG/DL (ref 65–140)
GLUCOSE SERPL-MCNC: 171 MG/DL (ref 65–140)
HCT VFR BLD AUTO: 37.9 % (ref 36.5–49.3)
HGB BLD-MCNC: 13 G/DL (ref 12–17)
MAGNESIUM SERPL-MCNC: 1.4 MG/DL (ref 1.9–2.7)
MCH RBC QN AUTO: 33.5 PG (ref 26.8–34.3)
MCHC RBC AUTO-ENTMCNC: 34.3 G/DL (ref 31.4–37.4)
MCV RBC AUTO: 98 FL (ref 82–98)
NRBC BLD AUTO-RTO: 0 /100 WBCS
P AXIS: 79 DEGREES
PLATELET # BLD AUTO: 67 THOUSANDS/UL (ref 149–390)
PMV BLD AUTO: 11.2 FL (ref 8.9–12.7)
POTASSIUM SERPL-SCNC: 4.4 MMOL/L (ref 3.5–5.3)
POTASSIUM SERPL-SCNC: 4.7 MMOL/L (ref 3.5–5.3)
PR INTERVAL: 132 MS
PROT SERPL-MCNC: 6.8 G/DL (ref 6.4–8.4)
QRS AXIS: 77 DEGREES
QRSD INTERVAL: 84 MS
QT INTERVAL: 318 MS
QTC INTERVAL: 428 MS
RBC # BLD AUTO: 3.88 MILLION/UL (ref 3.88–5.62)
SODIUM SERPL-SCNC: 128 MMOL/L (ref 135–147)
SODIUM SERPL-SCNC: 130 MMOL/L (ref 135–147)
T WAVE AXIS: 54 DEGREES
VENTRICULAR RATE: 109 BPM
WBC # BLD AUTO: 6.34 THOUSAND/UL (ref 4.31–10.16)

## 2023-11-20 PROCEDURE — 80053 COMPREHEN METABOLIC PANEL: CPT

## 2023-11-20 PROCEDURE — 85027 COMPLETE CBC AUTOMATED: CPT

## 2023-11-20 PROCEDURE — 83735 ASSAY OF MAGNESIUM: CPT

## 2023-11-20 PROCEDURE — 99232 SBSQ HOSP IP/OBS MODERATE 35: CPT | Performed by: FAMILY MEDICINE

## 2023-11-20 PROCEDURE — 82948 REAGENT STRIP/BLOOD GLUCOSE: CPT

## 2023-11-20 PROCEDURE — C9113 INJ PANTOPRAZOLE SODIUM, VIA: HCPCS

## 2023-11-20 PROCEDURE — 80048 BASIC METABOLIC PNL TOTAL CA: CPT | Performed by: INTERNAL MEDICINE

## 2023-11-20 PROCEDURE — 93010 ELECTROCARDIOGRAM REPORT: CPT | Performed by: INTERNAL MEDICINE

## 2023-11-20 PROCEDURE — 99223 1ST HOSP IP/OBS HIGH 75: CPT | Performed by: INTERNAL MEDICINE

## 2023-11-20 RX ORDER — ACETAMINOPHEN 10 MG/ML
1000 INJECTION, SOLUTION INTRAVENOUS ONCE
Status: COMPLETED | OUTPATIENT
Start: 2023-11-20 | End: 2023-11-20

## 2023-11-20 RX ORDER — HYDROXYZINE HYDROCHLORIDE 25 MG/1
25 TABLET, FILM COATED ORAL EVERY 8 HOURS SCHEDULED
Status: DISCONTINUED | OUTPATIENT
Start: 2023-11-20 | End: 2023-11-20

## 2023-11-20 RX ORDER — PANTOPRAZOLE SODIUM 40 MG/1
40 TABLET, DELAYED RELEASE ORAL
Status: DISCONTINUED | OUTPATIENT
Start: 2023-11-21 | End: 2023-11-21 | Stop reason: HOSPADM

## 2023-11-20 RX ORDER — LORAZEPAM 1 MG/1
2 TABLET ORAL ONCE
Status: COMPLETED | OUTPATIENT
Start: 2023-11-20 | End: 2023-11-20

## 2023-11-20 RX ORDER — HYDROXYZINE HYDROCHLORIDE 25 MG/1
25 TABLET, FILM COATED ORAL EVERY 8 HOURS PRN
Status: DISCONTINUED | OUTPATIENT
Start: 2023-11-20 | End: 2023-11-21 | Stop reason: HOSPADM

## 2023-11-20 RX ORDER — SODIUM CHLORIDE 1 G/1
2 TABLET ORAL
Status: DISCONTINUED | OUTPATIENT
Start: 2023-11-20 | End: 2023-11-21

## 2023-11-20 RX ORDER — MAGNESIUM SULFATE HEPTAHYDRATE 40 MG/ML
4 INJECTION, SOLUTION INTRAVENOUS ONCE
Status: COMPLETED | OUTPATIENT
Start: 2023-11-20 | End: 2023-11-20

## 2023-11-20 RX ADMIN — INSULIN LISPRO 1 UNITS: 100 INJECTION, SOLUTION INTRAVENOUS; SUBCUTANEOUS at 11:49

## 2023-11-20 RX ADMIN — INSULIN LISPRO 1 UNITS: 100 INJECTION, SOLUTION INTRAVENOUS; SUBCUTANEOUS at 21:04

## 2023-11-20 RX ADMIN — TRAMADOL HYDROCHLORIDE 50 MG: 50 TABLET, COATED ORAL at 11:51

## 2023-11-20 RX ADMIN — ACETAMINOPHEN 975 MG: 325 TABLET ORAL at 13:45

## 2023-11-20 RX ADMIN — PRAVASTATIN SODIUM 40 MG: 40 TABLET ORAL at 16:34

## 2023-11-20 RX ADMIN — ACETAMINOPHEN 975 MG: 325 TABLET ORAL at 05:48

## 2023-11-20 RX ADMIN — METOPROLOL TARTRATE 25 MG: 25 TABLET, FILM COATED ORAL at 08:13

## 2023-11-20 RX ADMIN — POLYETHYLENE GLYCOL 3350 17 G: 17 POWDER, FOR SOLUTION ORAL at 08:13

## 2023-11-20 RX ADMIN — Medication 1 TABLET: at 08:13

## 2023-11-20 RX ADMIN — INSULIN LISPRO 1 UNITS: 100 INJECTION, SOLUTION INTRAVENOUS; SUBCUTANEOUS at 16:34

## 2023-11-20 RX ADMIN — SODIUM CHLORIDE 40 ML/HR: 4 INJECTION, SOLUTION, CONCENTRATE INTRAVENOUS at 00:17

## 2023-11-20 RX ADMIN — RANOLAZINE 500 MG: 500 TABLET, FILM COATED, EXTENDED RELEASE ORAL at 05:48

## 2023-11-20 RX ADMIN — NICOTINE 1 PATCH: 21 PATCH, EXTENDED RELEASE TRANSDERMAL at 08:13

## 2023-11-20 RX ADMIN — ACETAMINOPHEN 1000 MG: 10 INJECTION INTRAVENOUS at 22:11

## 2023-11-20 RX ADMIN — LORAZEPAM 2 MG: 1 TABLET ORAL at 08:45

## 2023-11-20 RX ADMIN — FLUTICASONE FUROATE AND VILANTEROL TRIFENATATE 1 PUFF: 200; 25 POWDER RESPIRATORY (INHALATION) at 08:14

## 2023-11-20 RX ADMIN — METHOCARBAMOL 500 MG: 500 TABLET ORAL at 11:48

## 2023-11-20 RX ADMIN — METHOCARBAMOL 500 MG: 500 TABLET ORAL at 00:18

## 2023-11-20 RX ADMIN — MAGNESIUM SULFATE HEPTAHYDRATE 4 G: 40 INJECTION, SOLUTION INTRAVENOUS at 09:22

## 2023-11-20 RX ADMIN — RANOLAZINE 500 MG: 500 TABLET, FILM COATED, EXTENDED RELEASE ORAL at 16:34

## 2023-11-20 RX ADMIN — TRAMADOL HYDROCHLORIDE 50 MG: 50 TABLET, COATED ORAL at 18:03

## 2023-11-20 RX ADMIN — SODIUM CHLORIDE 2 G: 1 TABLET ORAL at 16:34

## 2023-11-20 RX ADMIN — METOPROLOL TARTRATE 25 MG: 25 TABLET, FILM COATED ORAL at 21:01

## 2023-11-20 RX ADMIN — METHOCARBAMOL 500 MG: 500 TABLET ORAL at 18:03

## 2023-11-20 RX ADMIN — GABAPENTIN 300 MG: 300 CAPSULE ORAL at 08:13

## 2023-11-20 RX ADMIN — THIAMINE HCL TAB 100 MG 100 MG: 100 TAB at 08:13

## 2023-11-20 RX ADMIN — PANTOPRAZOLE SODIUM 40 MG: 40 INJECTION, POWDER, FOR SOLUTION INTRAVENOUS at 06:03

## 2023-11-20 RX ADMIN — GABAPENTIN 300 MG: 300 CAPSULE ORAL at 21:01

## 2023-11-20 RX ADMIN — SODIUM CHLORIDE 2 G: 1 TABLET ORAL at 08:45

## 2023-11-20 RX ADMIN — HYDROXYZINE HYDROCHLORIDE 25 MG: 25 TABLET ORAL at 11:48

## 2023-11-20 RX ADMIN — FERROUS SULFATE TAB 325 MG (65 MG ELEMENTAL FE) 325 MG: 325 (65 FE) TAB at 08:13

## 2023-11-20 RX ADMIN — TRAMADOL HYDROCHLORIDE 50 MG: 50 TABLET, COATED ORAL at 02:45

## 2023-11-20 RX ADMIN — FOLIC ACID 1 MG: 1 TABLET ORAL at 08:13

## 2023-11-20 RX ADMIN — TAMSULOSIN HYDROCHLORIDE 0.4 MG: 0.4 CAPSULE ORAL at 08:13

## 2023-11-20 RX ADMIN — GABAPENTIN 300 MG: 300 CAPSULE ORAL at 16:35

## 2023-11-20 RX ADMIN — LIDOCAINE 1 PATCH: 50 PATCH CUTANEOUS at 08:13

## 2023-11-20 RX ADMIN — METHOCARBAMOL 500 MG: 500 TABLET ORAL at 05:48

## 2023-11-20 RX ADMIN — SODIUM CHLORIDE 2 G: 1 TABLET ORAL at 11:48

## 2023-11-20 NOTE — PROGRESS NOTES
The pantoprazole has / have been converted to Oral per [facility] IV-to-PO Auto-Conversion Protocol for Adults as approved by the Pharmacy and Therapeutics Committee. The patient met all eligible criteria:  3 Age = 25years old   2) Received at least one dose of the IV form   3) Receiving at least one other scheduled oral/enteral medication   4) Tolerating an oral/enteral diet       and did not have any exclusions:   1) Critical care patient   2) Active GI bleed (IF assessing H2RAs or PPIs)   3) Continuous tube feeding (IF assessing cipro, doxycycline, levofloxacin, minocycline, rifampin, or voriconazole)   4) Receiving PO vancomycin (IF assessing metronidazole)   5) Persistent nausea and/or vomiting   6) Ileus or gastrointestinal obstruction   7) Baldemar/nasogastric tube set for continuous suction   8) Specific order not to automatically convert to PO (in the order's comments or if discussed in the most recent Infectious Disease or primary team's progress notes).

## 2023-11-20 NOTE — CONSULTS
Consultation - Nephrology   Keya Nolasco 66 y.o. male MRN: [id number]  Unit/Bed#: [address] [place] [address] Encounter: [id number]    ASSESSMENT and PLAN:  Hyponatremia  -Admission Sodium: 125 meq/L  -Baseline Sodium: 127 to 132 meq/L   -Work Up: Urine Na:20, Urine Osmolality  288, Serum Osmolality 277, TSH in September was slightly low at 0.2 but free T4 was normal   -Etiology: Hyponatremia due to volume depletion, poor solute intake and excessive alcohol intake. Urine studies not suggestive of SIADH, not on any medications which would cause SIADH  -Hospital Course: Status post treatment with 1.8% saline on 11/20 a.m.  -Plan:   Last sodium level 128 meq/L after which 1.8% saline was stopped. Started on salt tablet 2 g 3 times daily monitor sodium level every 6 hours  Renal function has improved to creatinine 1.0 mg/dL.   Baseline creatinine has been around 1.0 to 1.2 mg/dL  Goal sodium by tomorrow a.m. would be around 133 meq/L  Recommend fluid restriction 1.5 L/day    Hypomagnesemia: Magnesium level 1.4: Replaced    Primary hypertension  -Home medication include lisinopril 10 mg daily, metoprolol 25 mg twice daily    Chronic alcohol abuse-management per primary team  Back pain status post fall resulting in rib fractures right 10th and 11th ribs while intoxicated 4 days ago, management per primary team    History of prostate cancer status postradiation    Others: Paroxysmal A-fib/CAD/dyslipidemia/COPD/diabetes mellitus type 2-management per primary team    Above plan regarding management of hyponatremia with use of salt tablet was discussed with primary team and agreed with the plan    HISTORY OF PRESENT ILLNESS:  Requesting Physician: Figueroa Thakur DO  Reason for Consult: Hyponatremia    Keya Nolasco is a 66 y.o. male with history of COPD, alcohol abuse, hyperlipidemia, hypertension, A-fib, prostate cancer status postradiation, CAD status post CABG, diabetes mellitus type 2 who was admitted to [facility] after presenting with status post fall 4 days prior to admission. He presented to ED on 11/17 with complaint of back pain especially with any movement and coughing. Also gave history of excessive alcohol consumption for 4 days prior to admission and poor appetite. Admission sodium was found to be 125, received IV normal saline bolus. Sodium improved to 132 meq/L 11/18 but again trended down to 127 on 11/19 1 PM and so nephrology was consulted. He was then started on salt tablet but sodium level did not improve and so was given 1.8% saline on 11/20 a.m. and was stopped around 7 AM after sodium improved to 128 meq/L    PAST MEDICAL HISTORY:  Past Medical History:   Diagnosis Date    Acute on chronic kidney failure      Alcohol withdrawal ([address]) 06/07/2019    Atrial fibrillation (HCC)     Cancer (HCC)     prostate ca,had radiation    Cardiac disease     stents,then triple bypass    COPD (chronic obstructive pulmonary disease) (HCC)     ETOH abuse     Hx of heart artery stent     2014    Hyperlipidemia     Hypertension     Hypovolemic shock ([address]) 12/22/2019    Lumbar spondylitis ([address]) 10/13/2022    Nasal bone fracture 10/10/2022    Prostate CA ([address])     S/P CABG x 3     2004    Sleep apnea        PAST SURGICAL HISTORY:  Past Surgical History:   Procedure Laterality Date    CARDIAC CATHETERIZATION      2 stents    CORONARY ARTERY BYPASS GRAFT  2004    PA ARTHRD ANT INTERBODY MIN [address] CRV BELOW C2 N/A 12/16/2020    Procedure: Anterior cervical discectomy with fusion C4-C7;  Posterior cervical decompression and fusion C2-T2;  Surgeon: Michele Estevez MD;  Location: [facility];  Service: Neurosurgery    TONSILLECTOMY         SOCIAL HISTORY:  Social History     Substance and Sexual Activity   Alcohol Use Not Currently    Alcohol/week: 4.0 standard drinks of alcohol    Types: 4 Standard drinks or equivalent per week     Social History     Substance and Sexual Activity   Drug Use No     Social History     Tobacco Use   Smoking Status Every Day    Packs/day: 1.50    Years: 40.00    Total pack years: 60.00    Types: Cigarettes   Smokeless Tobacco Never       FAMILY HISTORY:  Family History   Problem Relation Age of Onset    Diabetes Mother     Uterine cancer Mother     COPD Father     Hypertension Father        ALLERGIES:  No Known Allergies    MEDICATIONS:    Current Facility-Administered Medications:     acetaminophen (TYLENOL) tablet 975 mg, 975 mg, Oral, Q8H [facility], , 975 mg at 11/20/23 0548    albuterol (PROVENTIL HFA,VENTOLIN HFA) inhaler 2 puff, 2 puff, Inhalation, Q4H PRN, Sandy Alvarez MD    albuterol inhalation solution 2.5 mg, 2.5 mg, Nebulization, Q6H PRN, Sandy Alvarez MD    ferrous sulfate tablet 325 mg, 325 mg, Oral, Daily With Breakfast, Sandy Alvarez MD, 325 mg at 11/20/23 0813    fluticasone-vilanterol 200-25 mcg/actuation 1 puff, 1 puff, Inhalation, Daily, Sandy Alvarez MD, 1 puff at 50/52/40 9351    folic acid (FOLVITE) tablet 1 mg, 1 mg, Oral, Daily, Sandy Alvarez MD, 1 mg at 11/20/23 0813    gabapentin (NEURONTIN) capsule 300 mg, 300 mg, Oral, TID, Sandy Alvarez MD, 300 mg at 11/20/23 0813    insulin lispro (HumaLOG) 100 units/mL subcutaneous injection 1-5 Units, 1-5 Units, Subcutaneous, 4x Daily (AC & HS), 1 Units at 11/19/23 2138 **AND** Fingerstick Glucose (POCT), , , 4x Daily AC and at bedtime, Sandy Alvarez MD    lidocaine (LIDODERM) 5 % patch 1 patch, 1 patch, Topical, Daily, Sandy Alvarez MD, 1 patch at 11/20/23 0813    LORazepam (ATIVAN) tablet 2 mg, 2 mg, Oral, Once, Brandy Connors,     magnesium sulfate 4 g/100 mL IVPB (premix) 4 g, 4 g, Intravenous, Once, Shine Mera MD    methocarbamol (ROBAXIN) tablet 500 mg, 500 mg, Oral, Q6H [facility], Nivia Forbes DO, 500 mg at 11/20/23 0548    metoprolol tartrate (LOPRESSOR) tablet 25 mg, 25 mg, Oral, Q12H [facility], Sandy Alvarez MD, 25 mg at 11/20/23 0813    multivitamin-minerals (CENTRUM) tablet 1 tablet, 1 tablet, Oral, Daily, Dulce Malik MD, 1 tablet at 11/20/23 0813    nicotine (NICODERM CQ) 21 mg/24 hr TD 24 hr patch 1 patch, 1 patch, Transdermal, Daily, Dulce Malik MD, 1 patch at 11/20/23 0813    pantoprazole (PROTONIX) injection 40 mg, 40 mg, Intravenous, Q24H [address], Dulce Malik MD, 40 mg at 11/20/23 0603    polyethylene glycol (MIRALAX) packet 17 g, 17 g, Oral, Daily, Kermitt Potash, DO, 17 g at 11/20/23 0813    pravastatin (PRAVACHOL) tablet 40 mg, 40 mg, Oral, Daily With Chey Johnson MD, 40 mg at 11/19/23 1627    ranolazine (RANEXA) 12 hr tablet 500 mg, 500 mg, Oral, Q12H, Dulce Malik MD, 500 mg at 11/20/23 0548    tamsulosin (FLOMAX) capsule 0.4 mg, 0.4 mg, Oral, Daily, Dulce Malik MD, 0.4 mg at 11/20/23 0813    thiamine tablet 100 mg, 100 mg, Oral, Daily, Dulce Malik MD, 100 mg at 11/20/23 0813    traMADol (ULTRAM) tablet 50 mg, 50 mg, Oral, Q6H PRN, Brandy Revankar, DO, 50 mg at 11/20/23 0245    varenicline (CHANTIX) tablet 1 mg, 1 mg, Oral, BID, Dulce Malik MD    REVIEW OF SYSTEMS:   Review of Systems   Constitutional:  Negative for chills and fever. HENT:  Negative for ear pain and sore throat. Eyes:  Negative for pain and visual disturbance. Respiratory:  Negative for cough and shortness of breath. Cardiovascular:  Negative for chest pain and palpitations. Gastrointestinal:  Negative for abdominal pain and vomiting. Genitourinary:  Negative for dysuria and hematuria. Musculoskeletal:  Positive for back pain. Negative for arthralgias. Skin:  Negative for color change and rash. Neurological:  Negative for seizures and syncope. All other systems reviewed and are negative. All the systems were reviewed and were negative except as documented on the HPI.     PHYSICAL EXAM:  Current Weight: Weight - Scale: 92.8 kg (204 lb 9.4 oz)  First Weight: Weight - Scale: 90.9 kg (200 lb 4.8 oz)  Vitals:    11/20/23 0736 11/20/23 0749 11/20/23 0757 11/20/23 0800   BP: (!) 210/123 (!) 179/104 (!) 173/103 (!) 176/102   BP Location: Right arm      Pulse: 83 81 84 95   Resp:  20 20    Temp: 98.4 °F (36.9 °C) 99.9 °F (37.7 °C) 98.5 °F (36.9 °C)    TempSrc: Oral      SpO2: 97% 94% 93%    Weight:       Height:           Intake/Output Summary (Last 24 hours) at 11/20/2023 1587  Last data filed at 11/20/2023 7541  Gross per 24 hour   Intake 660 ml   Output 4125 ml   Net -3465 ml     Physical Exam  Constitutional:       General: He is not in acute distress. Appearance: He is well-developed. He is not diaphoretic. HENT:      Head: Normocephalic and atraumatic. Mouth/Throat:      Mouth: Mucous membranes are moist.   Eyes:      General: No scleral icterus. Conjunctiva/sclera: Conjunctivae normal.      Pupils: Pupils are equal, round, and reactive to light. Neck:      Thyroid: No thyromegaly. Cardiovascular:      Rate and Rhythm: Normal rate and regular rhythm. Heart sounds: Normal heart sounds. No murmur heard. No friction rub. Pulmonary:      Effort: Pulmonary effort is normal. No respiratory distress. Breath sounds: Normal breath sounds. No wheezing or rales. Abdominal:      General: Bowel sounds are normal. There is no distension. Palpations: Abdomen is soft. Tenderness: There is no abdominal tenderness. Musculoskeletal:         General: No deformity. Cervical back: Neck supple. Right lower leg: No edema. Left lower leg: No edema. Lymphadenopathy:      Cervical: No cervical adenopathy. Skin:     General: Skin is dry. Coloration: Skin is not pale. Nails: There is no clubbing. Comments: Large area of contusion over right flank   Neurological:      Mental Status: He is alert and oriented to person, place, and time. He is not disoriented. Psychiatric:         Mood and Affect: Mood normal. Mood is not anxious. Affect is not inappropriate.          Behavior: Behavior normal.         Thought Content: Thought content normal.           Invasive Devices:        Lab Results:   Results from last 7 days   Lab Units 11/20/23  0600 11/19/23  2222 11/19/23  1340 11/19/23  0531 11/18/23  0859   WBC Thousand/uL 6.34  --   --  4.52 5.77   HEMOGLOBIN g/dL 13.0  --   --  14.0 13.5   HEMATOCRIT % 37.9  --   --  41.1 38.9   PLATELETS Thousands/uL 67*  --   --  37* 85*   POTASSIUM mmol/L 4.7 4.6 4.4 5.3 4.5   CHLORIDE mmol/L 95* 94* 94* 96 97   CO2 mmol/L 27 26 25 20* 27   BUN mg/dL 21 21 21 22 17   CREATININE mg/dL 1.01 1.24 1.37* 1.13 1.23   CALCIUM mg/dL 9.2 8.7 8.5 8.6 8.6   MAGNESIUM mg/dL 1.4*  --   --  2.2 1.2*   PHOSPHORUS mg/dL  --   --   --   --  2.5   ALK PHOS U/L 59  --   --  64 72   ALT U/L 37  --   --  46 68*   AST U/L 32  --   --  49* 81*       Other Studies:    On 11/17 CT abdomen pelvis with contrast-displaced fracture of right 10th and 11th ribs, finding of bilateral renal calculi. Finding of hypodense lesion in the right kidney but most likely representing renal cyst    Portions of the record may have been created with voice recognition software. Occasional wrong word or "sound a like" substitutions may have occurred due to the inherent limitations of voice recognition software. Read the chart carefully and recognize, using context, where substitutions have occurred. If you have any questions, please contact the dictating provider.

## 2023-11-20 NOTE — PLAN OF CARE
Problem: PAIN - ADULT  Goal: Verbalizes/displays adequate comfort level or baseline comfort level  Description: Interventions:  - Encourage patient to monitor pain and request assistance  - Assess pain using appropriate pain scale  - Administer analgesics based on type and severity of pain and evaluate response  - Implement non-pharmacological measures as appropriate and evaluate response  - Consider cultural and social influences on pain and pain management  - Notify physician/advanced practitioner if interventions unsuccessful or patient reports new pain  Outcome: Progressing     Problem: SAFETY ADULT  Goal: Patient will remain free of falls  Description: INTERVENTIONS:  - Educate patient/family on patient safety including physical limitations  - Instruct patient to call for assistance with activity   - Consult OT/PT to assist with strengthening/mobility   - Keep Call bell within reach  - Keep bed low and locked with side rails adjusted as appropriate  - Keep care items and personal belongings within reach  - Initiate and maintain comfort rounds  - Make Fall Risk Sign visible to staff  - Offer Toileting every 2 Hours, in advance of need  - Initiate/Maintain bed alarm  - Obtain necessary fall risk management equipment: walker  - Apply yellow socks and bracelet for high fall risk patients  - Consider moving patient to room near nurses station  Outcome: Progressing

## 2023-11-20 NOTE — PROGRESS NOTES
[place] [facility] [place]  Omar Bunch 66 y.o. male MRN: [id number]  Unit/Bed#: [address] [place] [address] Encounter: [id number]  Admitting Physician: Tanisha Ivy DO   PCP: Elen Barrow MD  Date of Admission:  11/17/2023 12:42 PM    Assessment and Plan    * Hyponatremia  Assessment & Plan  Acute on chronic, likely in setting of chronic alcohol use and poor PO intake. Na 125 on admission, improved to 132.   11/20 Na was 128. IV fluids given and repeat BMP showed that Na dropped to 127. Patient states that he has been drinking a lot of water. Patient was then on 1500 ML fluid restriction and repeat BMP 6 hours later showed that Na dropped further to 126. Patient was started on hypertonic saline and sodium improved slightly to 128      Urine sodium 20  Urine osm 288    Plan:  S/p NS IVF bolus in ED   I/O's   Repeat BMP on 11/19/23 showed that Na dropped from 128 to 127. Likely SIADH related/ patient has been drinking lot of water  Started patient on hypertonic saline 1.8%  which was then discontinued  Continue with fluid restriction at 1500 mL/day   Patient now on NaCl tablets 2 mg TID as per Nephro  Nephro following patient  BMP every 6 hours; check repeat sodium levels    Alcohol intoxication ([address])  Assessment & Plan  Patient presenting with back pain after sustaining a fall 4 days ago while he was intoxicated. Patient states that he has been drinking  a quart of vodka+ice tea/day non-stop since the past 4 days. Patient quit drinking for 2 months however, restarted drinking a month ago because he ran out of naltrexone.  ROSAURA at the time of presentation 24     Patient is still feeling shaky but tremors have improved compared to last few days; tremors well controlled with Ativan  Continue with CIWA protocol   PO Ativan x1  Seizure precautions   Thiamine, folate and multivitamins     COPD (chronic obstructive pulmonary disease) ([address])  Assessment & Plan  Patient has SOB at baseline, not on home oxygen. Currently smoking 1.5 packs/day   Today his shortness of breath has improved      Continue home inhalers   Nicotine patch   Continue chantix    Back pain  Assessment & Plan  Patient complaining of back pain after falling on a hardwood floor while intoxicated 4 days today. Pain worsened and increasing with coughing and movement, hence patient presented to the ED on 11/17. C/A/P 11/17/23: Acute, mildly displaced fractures of the right 10th and 11th ribs posteriorly. States that his back pain has improved today    Tylenol 975 mg Q8H scheduled   Robaxin Q6H scheduled   Continue with Lidoderm patch   PO Tramadol 50 mg every 6 hours PRN for moderate to severe pain  Patient on abdominal binder    Stage 3a chronic kidney disease [facility])  Assessment & Plan  Lab Results   Component Value Date    EGFR 69 11/20/2023    EGFR 79 11/20/2023    EGFR 62 11/19/2023    CREATININE 1.14 11/20/2023    CREATININE 1.01 11/20/2023    CREATININE 1.24 11/19/2023     Stable, at baseline Cr of 1.1-1.3. Creatinine has improved to 1.13 today. Plan:  - I/O's  - Avoid nephrotoxins  - Renally dose medications    Chronic heart failure with preserved ejection fraction [facility])  Assessment & Plan  Wt Readings from Last 3 Encounters:   11/20/23 92.8 kg (204 lb 9.4 oz)   11/03/23 94.3 kg (208 lb)   10/30/23 92.5 kg (204 lb)     Echo 9/2023 - EF 26% moderate diastolic dysfunction     Gentle fluid hydration   Input and output monitoring  Monitor weights   Monitor vital signs          CAD (coronary artery disease)  Assessment & Plan  Patient with hx of CAD s/p CABG. Patient had some melena previously for which he was seen on prior admission  Eliquis 5 mg BID was first held due to reported melena. Platelets 28/37/89 improved from 37 to 67. Restarted Eliquis as hemoglobin continues to be stable.  Monitor for any recurrence of bleeding or melena   Platelet count dropped to 37 on 11/20/23, so Eliquis was held  Monitor for any recurrence of bleeding or melena  Hold Eliquis for now    Constipation  Assessment & Plan  Patient reporting melena at home, has not had a BM in 4-5 days before admission. Patient had a bowel movement yesterday    Plan:  -Start miralax 17 g PO daily    GERD (gastroesophageal reflux disease)  Assessment & Plan  Patient has  hx of upper GI bleed and reporting dark stools since weeks. No active bleeding. Hb stable POA  Hemoglobin today is 14    Protonix 40mg IV       Paroxysmal atrial fibrillation with rapid ventricular response (HCC)  Assessment & Plan  Chronic, stable. Plan:  Continue home Eliquis 5 mg BID  Continue lopressor 25mg BID     Dyslipidemia  Assessment & Plan  Chronic. Continue Pravastatin 40 mg daily      Type 2 diabetes mellitus [facility])  Assessment & Plan  Lab Results   Component Value Date    HGBA1C 6.1 (H) 09/20/2023     ISS   Monitor for hypoglycemia          VTE Pharmacologic Prophylaxis: VTE Score: 6 Moderate Risk (Score 3-4) - Pharmacological DVT Prophylaxis Contraindicated. Sequential Compression Devices Ordered. Patient Centered Rounds: I have performed bedside rounds with nursing staff today. Discussions with Specialists or Other Care Team Provider: Yes    Education and Discussions with Family / Patient: Yes  Patient Information Sharing: With the consent of Beverly Wolfe , their loved ones were notified today by inpatient team of the patient’s condition and current plan. All questions answered. Time Spent for Care: 30 minutes. More than 50% of total time spent on counseling and coordination of care as described above. Current Length of Stay: 2 day(s)    Current Patient Status: Inpatient   Certification Statement: The patient will continue to require additional inpatient hospital stay due to hyponatremia and low platelets    Discharge Plan: Pending    Code Status: Level 1 - Full Code    Subjective:   Patient was seen and examined at bedside.  Patient dates that his back pain is improved after giving him tramadol and on the abdominal binder. His tremors and shakiness has improved compared to last few days. Patient states that he wants to be discharged tomorrow next planed to the patient that he will be discharged in 1 day after his sodium levels normalize. He denies any nausea, vomiting, weakness. Objective     Objective:   Vitals:   Temp (24hrs), Av.4 °F (36.9 °C), Min:97.7 °F (36.5 °C), Max:99.9 °F (37.7 °C)    Temp:  [97.7 °F (36.5 °C)-99.9 °F (37.7 °C)] 98.5 °F (36.9 °C)  HR:  [64-95] 73  Resp:  [16-20] 20  BP: (135-210)/() 153/75  SpO2:  [91 %-97 %] 92 %  Body mass index is 26.27 kg/m². Input and Output Summary (last 24 hours): Intake/Output Summary (Last 24 hours) at 2023 1320  Last data filed at 2023 0948  Gross per 24 hour   Intake 240 ml   Output 4275 ml   Net -4035 ml       Physical Exam:   Physical Exam  Constitutional:       General: He is not in acute distress. Appearance: Normal appearance. He is not diaphoretic. Neck:      Vascular: No carotid bruit. Cardiovascular:      Rate and Rhythm: Normal rate and regular rhythm. Pulses: Normal pulses. Heart sounds: No murmur heard. Friction rub present. No gallop. Pulmonary:      Effort: Pulmonary effort is normal. No respiratory distress. Breath sounds: No stridor. No wheezing, rhonchi or rales. Chest:      Chest wall: No tenderness. Abdominal:      General: Bowel sounds are normal. There is no distension. Palpations: Abdomen is soft. Tenderness: There is no abdominal tenderness. There is no right CVA tenderness, left CVA tenderness, guarding or rebound. Hernia: No hernia is present. Musculoskeletal:         General: Tenderness present. No swelling, deformity or signs of injury. Cervical back: Normal range of motion. No rigidity or tenderness. Right lower leg: No edema. Left lower leg: No edema. Lymphadenopathy:      Cervical: No cervical adenopathy. Skin:     Capillary Refill: Capillary refill takes less than 2 seconds. Coloration: Skin is not jaundiced or pale. Findings: Bruising present. No erythema, lesion or rash. Neurological:      General: No focal deficit present. Mental Status: He is alert and oriented to person, place, and time. Mental status is at baseline. Motor: Tremor present. Deep Tendon Reflexes:      Reflex Scores:       Tricep reflexes are 2+ on the right side and 2+ on the left side. Bicep reflexes are 2+ on the right side and 2+ on the left side. Brachioradialis reflexes are 2+ on the right side and 2+ on the left side. Patellar reflexes are 2+ on the right side and 2+ on the left side. Achilles reflexes are 2+ on the right side and 2+ on the left side. Additional Data:     Labs:  Results from last 7 days   Lab Units 11/20/23  0600 11/19/23  0531   WBC Thousand/uL 6.34 4.52   HEMOGLOBIN g/dL 13.0 14.0   HEMATOCRIT % 37.9 41.1   PLATELETS Thousands/uL 67* 37*   NEUTROS PCT %  --  63   LYMPHS PCT %  --  21   MONOS PCT %  --  12   EOS PCT %  --  3     Results from last 7 days   Lab Units 11/20/23  1033 11/20/23  0600   POTASSIUM mmol/L 4.4 4.7   CHLORIDE mmol/L 98 95*   CO2 mmol/L 25 27   BUN mg/dL 20 21   CREATININE mg/dL 1.14 1.01   CALCIUM mg/dL 8.6 9.2   ALK PHOS U/L  --  59   ALT U/L  --  37   AST U/L  --  32         Results from last 7 days   Lab Units 11/20/23  1057 11/20/23  0659 11/19/23  2046 11/19/23  1620 11/19/23  1113   POC GLUCOSE mg/dl 153* 130 151* 135 104           * I Have Reviewed All Lab Data Listed Above. * Additional Pertinent Lab Tests Reviewed:  [facility] Admission Reviewed    Imaging:    Imaging Reports Reviewed Today Include: CT head without contrast  Imaging Personally Reviewed by Myself Includes:      Recent Cultures (last 7 days):           Last 24 Hours Medication List: Current Facility-Administered Medications   Medication Dose Route Frequency Provider Last Rate    acetaminophen  975 mg Oral [facility] Cyrena Bouche, DO      albuterol  2 puff Inhalation Q4H PRN Chaim Carlin MD      albuterol  2.5 mg Nebulization Q6H PRN Chaim Carlin MD      ferrous sulfate  325 mg Oral Daily With Breakfast Chaim Carlin MD      fluticasone-vilanterol  1 puff Inhalation Daily Chaim Carlin MD      folic acid  1 mg Oral Daily Chaim Carlin MD      gabapentin  300 mg Oral TID Chaim Carlin MD      hydrOXYzine HCL  25 mg Oral Q8H [address] Karla Forrester MD      insulin lispro  1-5 Units Subcutaneous 4x Daily (AC & HS) Chaim Carlin MD      lidocaine  1 patch Topical Daily Chaim Carlin MD      magnesium sulfate  4 g Intravenous Once Karla Forrester MD 4 g (11/20/23 0129)    methocarbamol  500 mg Oral Q6H [address] Edson Glover, DO      metoprolol tartrate  25 mg Oral Q12H [address] Chaim Carlin MD      multivitamin-minerals  1 tablet Oral Daily Chaim Carlin MD      nicotine  1 patch Transdermal Daily Chaim Carlin MD      [START ON 11/21/2023] pantoprazole  40 mg Oral Early Morning Brandy Revankar, DO      polyethylene glycol  17 g Oral Daily Cyrena Boshannane, DO      pravastatin  40 mg Oral Daily With Torres Jin MD      ranolazine  500 mg Oral Q12H Chaim Carlin MD      sodium chloride  2 g Oral TID With Meals Kirstin Rey MD      tamsulosin  0.4 mg Oral Daily Chaim Carlin MD      thiamine  100 mg Oral Daily Chaim Carlin MD      traMADol  50 mg Oral Q6H PRN Brandy Revankar, DO      varenicline  1 mg Oral BID Chaim Carlin MD               ** Please Note: Dictation voice to text software may have been used in the creation of this document.  **    Karla Forrester MD  11/20/23  1:20 PM

## 2023-11-20 NOTE — CASE MANAGEMENT
Case Management Discharge Planning Note    Patient name Wilda Draper  Location [address] [place] [address]  Joan Betts MRN [id number]  : 1960 Date 2023       Current Admission Date: 2023  Current Admission Diagnosis:Alcohol intoxication [facility])   Patient Active Problem List    Diagnosis Date Noted    Constipation 2023    Melena 10/29/2023    Abnormal thyroid stimulating hormone level 2023    Chest pain 2023    Paroxysmal atrial fibrillation with rapid ventricular response ([address]) 2023    GERD (gastroesophageal reflux disease) 2023    Sleep apnea 2023    Stable angina 2022    Stage 3a chronic kidney disease ([address]) 2022    Fatty liver, alcoholic     Nonischemic nontraumatic myocardial injury 04/10/2022    History of atrial fibrillation 10/25/2021    Poor historian 10/24/2021    Mild protein-calorie malnutrition ([address]) 2021    Prostate cancer ([address]) 2021    History of Atrial fibrillation ([address]) 2020    Encounter for smoking cessation counseling 2020    Chronic heart failure with preserved ejection fraction ([address]) 2019    Alcohol intoxication ([address]) 2019    Back pain 2019    Alcohol abuse 10/17/2019    Hyponatremia 10/17/2019    Cervical spinal stenosis 10/17/2019    Thrombocytopenia ([address]) 2019    History of prostate cancer 2019    CAD (coronary artery disease) 2019    Nicotine abuse 2019    Hypomagnesemia 10/10/2018    Depression, recurrent ([address]) 10/10/2018    Hx of CABG 10/10/2018    Dyslipidemia 10/10/2018    COPD (chronic obstructive pulmonary disease) ([address]) 10/09/2018    Type 2 diabetes mellitus ([address]) 10/09/2018    Essential hypertension 10/09/2018      LOS (days): 2  Geometric Mean LOS (GMLOS) (days):   Days to GMLOS:     OBJECTIVE:  Risk of Unplanned Readmission Score: 37.35     Current admission status: Inpatient   Preferred Pharmacy:   [facility], [address] [facility] [address] [address]  Phone: [phone] Fax: [phone]    Primary Care Provider: Hayley Mueller MD    Primary Insurance: [facility]  Secondary Insurance:     DISCHARGE DETAILS:    Discharge planning discussed with[de-identified] Patient  Freedom of Choice: Yes  Comments - Freedom of Choice: SW following to monitor needs. SW met with pt to review plan and IMM. Pt's plan is to return home when discharged. Only discharge need expressed by pt is transportation home. SW will follow to assist when ready. Other Referral/Resources/Interventions Provided:  Interventions: None Indicated    Treatment Team Recommendation: Home  Discharge Destination Plan[de-identified] Home  Transport at Discharge : Other (Comment) (Lyft)    IMM Given (Date):: 11/20/23  IMM Given to[de-identified] Patient (Initiall IMM reviewed with pt. Pt verbalized understanding. Pt signed IMM and copy given.   Copy also placed in scan bin for chart.)

## 2023-11-21 ENCOUNTER — TRANSITIONAL CARE MANAGEMENT (OUTPATIENT)
Dept: FAMILY MEDICINE CLINIC | Facility: CLINIC | Age: 63
End: 2023-11-21

## 2023-11-21 VITALS
HEIGHT: 74 IN | TEMPERATURE: 98.9 F | BODY MASS INDEX: 26.31 KG/M2 | DIASTOLIC BLOOD PRESSURE: 89 MMHG | WEIGHT: 205.03 LBS | SYSTOLIC BLOOD PRESSURE: 157 MMHG | OXYGEN SATURATION: 96 % | RESPIRATION RATE: 16 BRPM | HEART RATE: 79 BPM

## 2023-11-21 PROBLEM — K59.00 CONSTIPATION: Status: RESOLVED | Noted: 2023-11-18 | Resolved: 2023-11-21

## 2023-11-21 PROBLEM — S22.41XA CLOSED FRACTURE OF MULTIPLE RIBS OF RIGHT SIDE: Status: ACTIVE | Noted: 2019-11-23

## 2023-11-21 PROBLEM — F10.929 ALCOHOL INTOXICATION (HCC): Status: RESOLVED | Noted: 2019-11-23 | Resolved: 2023-11-21

## 2023-11-21 LAB
ANION GAP SERPL CALCULATED.3IONS-SCNC: 8 MMOL/L
BASOPHILS # BLD AUTO: 0.06 THOUSANDS/ÂΜL (ref 0–0.1)
BASOPHILS NFR BLD AUTO: 1 % (ref 0–1)
BUN SERPL-MCNC: 21 MG/DL (ref 5–25)
CALCIUM SERPL-MCNC: 9.2 MG/DL (ref 8.4–10.2)
CHLORIDE SERPL-SCNC: 97 MMOL/L (ref 96–108)
CO2 SERPL-SCNC: 24 MMOL/L (ref 21–32)
CREAT SERPL-MCNC: 1.07 MG/DL (ref 0.6–1.3)
EOSINOPHIL # BLD AUTO: 0.35 THOUSAND/ÂΜL (ref 0–0.61)
EOSINOPHIL NFR BLD AUTO: 5 % (ref 0–6)
ERYTHROCYTE [DISTWIDTH] IN BLOOD BY AUTOMATED COUNT: 14.2 % (ref 11.6–15.1)
GFR SERPL CREATININE-BSD FRML MDRD: 74 ML/MIN/1.73SQ M
GLUCOSE SERPL-MCNC: 117 MG/DL (ref 65–140)
GLUCOSE SERPL-MCNC: 128 MG/DL (ref 65–140)
GLUCOSE SERPL-MCNC: 98 MG/DL (ref 65–140)
HCT VFR BLD AUTO: 37.3 % (ref 36.5–49.3)
HGB BLD-MCNC: 12.6 G/DL (ref 12–17)
IMM GRANULOCYTES # BLD AUTO: 0.05 THOUSAND/UL (ref 0–0.2)
IMM GRANULOCYTES NFR BLD AUTO: 1 % (ref 0–2)
LYMPHOCYTES # BLD AUTO: 1.39 THOUSANDS/ÂΜL (ref 0.6–4.47)
LYMPHOCYTES NFR BLD AUTO: 20 % (ref 14–44)
MAGNESIUM SERPL-MCNC: 1.7 MG/DL (ref 1.9–2.7)
MCH RBC QN AUTO: 33.5 PG (ref 26.8–34.3)
MCHC RBC AUTO-ENTMCNC: 33.8 G/DL (ref 31.4–37.4)
MCV RBC AUTO: 99 FL (ref 82–98)
MONOCYTES # BLD AUTO: 1.1 THOUSAND/ÂΜL (ref 0.17–1.22)
MONOCYTES NFR BLD AUTO: 16 % (ref 4–12)
NEUTROPHILS # BLD AUTO: 4.04 THOUSANDS/ÂΜL (ref 1.85–7.62)
NEUTS SEG NFR BLD AUTO: 57 % (ref 43–75)
NRBC BLD AUTO-RTO: 0 /100 WBCS
PLATELET # BLD AUTO: 71 THOUSANDS/UL (ref 149–390)
PMV BLD AUTO: 11.4 FL (ref 8.9–12.7)
POTASSIUM SERPL-SCNC: 4.9 MMOL/L (ref 3.5–5.3)
RBC # BLD AUTO: 3.76 MILLION/UL (ref 3.88–5.62)
SODIUM SERPL-SCNC: 129 MMOL/L (ref 135–147)
WBC # BLD AUTO: 6.99 THOUSAND/UL (ref 4.31–10.16)

## 2023-11-21 PROCEDURE — 82948 REAGENT STRIP/BLOOD GLUCOSE: CPT

## 2023-11-21 PROCEDURE — 80048 BASIC METABOLIC PNL TOTAL CA: CPT | Performed by: INTERNAL MEDICINE

## 2023-11-21 PROCEDURE — 99232 SBSQ HOSP IP/OBS MODERATE 35: CPT | Performed by: INTERNAL MEDICINE

## 2023-11-21 PROCEDURE — 99239 HOSP IP/OBS DSCHRG MGMT >30: CPT | Performed by: INTERNAL MEDICINE

## 2023-11-21 PROCEDURE — 83735 ASSAY OF MAGNESIUM: CPT | Performed by: INTERNAL MEDICINE

## 2023-11-21 PROCEDURE — 85025 COMPLETE CBC W/AUTO DIFF WBC: CPT

## 2023-11-21 RX ORDER — LIDOCAINE 50 MG/G
1 PATCH TOPICAL DAILY
Qty: 30 PATCH | Refills: 0 | Status: SHIPPED | OUTPATIENT
Start: 2023-11-22 | End: 2023-12-03

## 2023-11-21 RX ORDER — LISINOPRIL 10 MG/1
10 TABLET ORAL DAILY
Status: DISCONTINUED | OUTPATIENT
Start: 2023-11-21 | End: 2023-11-21 | Stop reason: HOSPADM

## 2023-11-21 RX ORDER — LANOLIN ALCOHOL/MO/W.PET/CERES
400 CREAM (GRAM) TOPICAL DAILY
Status: DISCONTINUED | OUTPATIENT
Start: 2023-11-21 | End: 2023-11-21 | Stop reason: HOSPADM

## 2023-11-21 RX ORDER — MAGNESIUM SULFATE HEPTAHYDRATE 40 MG/ML
4 INJECTION, SOLUTION INTRAVENOUS ONCE
Status: COMPLETED | OUTPATIENT
Start: 2023-11-21 | End: 2023-11-21

## 2023-11-21 RX ORDER — SODIUM CHLORIDE 1 G/1
2 TABLET ORAL
Qty: 60 TABLET | Refills: 0 | Status: ON HOLD | OUTPATIENT
Start: 2023-11-21 | End: 2023-12-03 | Stop reason: SDUPTHER

## 2023-11-21 RX ORDER — SODIUM CHLORIDE 1 G/1
2 TABLET ORAL
Status: DISCONTINUED | OUTPATIENT
Start: 2023-11-21 | End: 2023-11-21 | Stop reason: HOSPADM

## 2023-11-21 RX ADMIN — ACETAMINOPHEN 975 MG: 325 TABLET ORAL at 05:17

## 2023-11-21 RX ADMIN — LISINOPRIL 10 MG: 10 TABLET ORAL at 08:32

## 2023-11-21 RX ADMIN — FERROUS SULFATE TAB 325 MG (65 MG ELEMENTAL FE) 325 MG: 325 (65 FE) TAB at 08:32

## 2023-11-21 RX ADMIN — PANTOPRAZOLE SODIUM 40 MG: 40 TABLET, DELAYED RELEASE ORAL at 05:18

## 2023-11-21 RX ADMIN — FLUTICASONE FUROATE AND VILANTEROL TRIFENATATE 1 PUFF: 200; 25 POWDER RESPIRATORY (INHALATION) at 08:38

## 2023-11-21 RX ADMIN — METHOCARBAMOL 500 MG: 500 TABLET ORAL at 00:33

## 2023-11-21 RX ADMIN — Medication 1 TABLET: at 08:32

## 2023-11-21 RX ADMIN — NICOTINE 1 PATCH: 21 PATCH, EXTENDED RELEASE TRANSDERMAL at 08:34

## 2023-11-21 RX ADMIN — GABAPENTIN 300 MG: 300 CAPSULE ORAL at 08:32

## 2023-11-21 RX ADMIN — MAGNESIUM SULFATE HEPTAHYDRATE 4 G: 40 INJECTION, SOLUTION INTRAVENOUS at 09:08

## 2023-11-21 RX ADMIN — TAMSULOSIN HYDROCHLORIDE 0.4 MG: 0.4 CAPSULE ORAL at 08:32

## 2023-11-21 RX ADMIN — SODIUM CHLORIDE 2 G: 1 TABLET ORAL at 11:34

## 2023-11-21 RX ADMIN — RANOLAZINE 500 MG: 500 TABLET, FILM COATED, EXTENDED RELEASE ORAL at 05:18

## 2023-11-21 RX ADMIN — METHOCARBAMOL 500 MG: 500 TABLET ORAL at 11:34

## 2023-11-21 RX ADMIN — THIAMINE HCL TAB 100 MG 100 MG: 100 TAB at 08:32

## 2023-11-21 RX ADMIN — METOPROLOL TARTRATE 25 MG: 25 TABLET, FILM COATED ORAL at 08:32

## 2023-11-21 RX ADMIN — SODIUM CHLORIDE 2 G: 1 TABLET ORAL at 08:32

## 2023-11-21 RX ADMIN — LIDOCAINE 1 PATCH: 50 PATCH CUTANEOUS at 08:31

## 2023-11-21 RX ADMIN — FOLIC ACID 1 MG: 1 TABLET ORAL at 08:32

## 2023-11-21 RX ADMIN — Medication 400 MG: at 09:08

## 2023-11-21 RX ADMIN — TRAMADOL HYDROCHLORIDE 50 MG: 50 TABLET, COATED ORAL at 08:45

## 2023-11-21 RX ADMIN — TRAMADOL HYDROCHLORIDE 50 MG: 50 TABLET, COATED ORAL at 00:42

## 2023-11-21 NOTE — DISCHARGE SUMMARY
Discharge Summary - [address] [place] [address] Family Medicine Residency     Patient Information: Manny Pike 66 y.o. male MRN: [id number]  Unit/Bed#: [facility] [address] Encounter: [id number]     Admitting Physician: Regan Lucero DO  Discharging Physician/Practitioner: Masood Pearson MD   PCP: Redd Sierra MD  Admission Date:   Admission Orders (From admission, onward)       Ordered        11/18/23 1227  Inpatient Admission  Once            11/17/23 1552  Place in Observation  Once                          Discharge Date: 11/21/23      Reason for Admission: Alcohol intoxication ([address]) [F10.929]  Back pain [M54.9]  Hyponatremia [E87.1]  Rib fracture [S22.39XA]     Discharge Diagnoses:      Principal Problem:    Hyponatremia  Active Problems:    COPD (chronic obstructive pulmonary disease) ([facility])    Closed fracture of multiple ribs of right side    Stage 3a chronic kidney disease (HCC)    CAD (coronary artery disease)    Chronic heart failure with preserved ejection fraction ([facility])    Type 2 diabetes mellitus ([address])    Essential hypertension    Dyslipidemia    Paroxysmal atrial fibrillation with rapid ventricular response ([facility])    GERD (gastroesophageal reflux disease)  Resolved Problems:    Alcohol intoxication ([address])    Constipation       Consultations During Hospital Stay:  ·       Nephrology     Procedures Performed:   ·       none     Significant Findings / Test Results:   11/20: Na 128 >130,   11/19: Na 128 > 127, Cr 1.37, , Plt 37  11/18: Na 132, Ast 81, Alt 68, Mg 1.2, Urine Na 20, Urine Osm 288  11/17: , AST 88, ALT 70, CR 1.24, WBC 7.38, Hgb 13.5, PLT 92, med EtOH 409  - CT C-spine: no acute cervical spine fracture or traumatic malalignment  - CT head: no acute intracranial abnormality  - CT chest abdomen pelvis: acute mildly displaced fractures of the right 10th and 11th rib among a few chronic findings     Incidental Findings:   ·       none      Test Results Pending at Discharge (will require follow up):   ·       none     Outpatient Tests Requested:  ·       none     Outpatient follow-up Requested:  ·       PCP & Nephro    Complications:  none    Things to address at first visit after hospitalization   Have you been taking Naltrexone? Have you been taking magnesium oxide 400 mg daily? Have you been taking your salt tablets 2 g 4 times a day? Did you get the repeat BMP done this Friday? Have you experienced any episodes of headache, lightheadedness, dizziness, syncope? Have you followed up with Nephrology? Has your back pain improved? Hospital Course:   Pretty Gallagher is a 66 y.o. male patient with PMHx of COPD, HLD, HTN, CKD, Afib, prostate CA s/p radiation, CAD s/p CABG, DM II, & Alcohol Abuse who originally presented to the hospital on 11/17/2023 due to 4 days of persistent back pain after sustaining a fall while intoxicated. Reported running out of Naltrexone after 2 months of sobriety, leading to daily excessive alcohol consumption and poor PO intake. Patient had no recollection of the events leading up to the fall. ED imaging revealed acute posterior fractures of the right 10th and 11th ribs and labs were significant for hypovolemic hyponatremia, which prompted admission. Patient received adequate pain management during hospital course. Coordinated care with Nephrology consultation resulted in overall improvement of electrolyte derangements. Patient is currently AFVSS and prepared for discharge. Patient to continue salt tablets 2 g 4 times a day and get a repeat BMP done this coming Friday. * Hyponatremia  Assessment & Plan  Acute on chronic in setting of chronic alcohol use, volume depletion and poor PO intake. Na of 125 on admission improved to 132 after an IV NS bolus. Urine Na 20 & Osm 288 not suggestive of SIADH. Na trended down despite 1500mL fluid restrictions and salt tabs. Patient received a short course of 1.8% saline as per nephro recs.  Na levels currently up trending and patient denies any hyponatremic symptoms. Sodium dropped today to 129 and nephrology started patient on sodium Chloride tablets 2 g 4 times a day    Plan  Patient to be discharged on sodium chloride tablets 2 g 4 times a day for 2 weeks  Discussed with patient to repeat BMP this Friday  Continue fluid restriction     Alcohol intoxication (HCC)-resolved as of 11/21/2023  Assessment & Plan  Patient presenting with back pain after sustaining a fall 4 days ago while he was intoxicated. Patient states that he has been drinking  a quart of vodka+ice tea/day non-stop since the past 4 days. Patient quit drinking for 2 months however, restarted drinking a month ago because he ran out of naltrexone. ROSAURA at the time of presentation 21. Tremors improved compared to last few days; tremors well controlled with Ativan    Continue to take thiamine, folate and multivitamins   Continue to take naltrexone after discharge    COPD (chronic obstructive pulmonary disease) ([address])  Assessment & Plan  Patient has SOB at baseline, not on home oxygen. Currently smoking 1.5 packs/day. He was started on his home inhalers    Continue home inhalers Breo Ellipta  Continue with nicotine patch   Continue chantix    Closed fracture of multiple ribs of right side  Assessment & Plan  Patient complaining of back pain after falling on a hardwood floor while intoxicated. Pain worsened and increasing with coughing and movement, hence patient presented to the ED on 11/17.   - C/A/P 11/17/23: Acute, mildly displaced fractures of the right 10th and 11th ribs posteriorly. Patient was for started on Robaxin, Lidoderm patch, and an abdominal binder was placed. Patient states that his has improved significantly. States that his back pain is tolerable.      Plan  Continue with extra strength Tylenol as needed for pain  Continue with Lidoderm patch     Stage 3a chronic kidney disease [facility])  Assessment & Plan  Lab Results   Component Value Date EGFR 69 11/20/2023    EGFR 79 11/20/2023    EGFR 62 11/19/2023    CREATININE 1.14 11/20/2023    CREATININE 1.01 11/20/2023    CREATININE 1.24 11/19/2023   Stable, at baseline Cr of 1.1-1.3. Recommend follow-up outpatient with nephrologist    Chronic heart failure with preserved ejection fraction [facility])  Assessment & Plan  Wt Readings from Last 3 Encounters:   11/20/23 92.8 kg (204 lb 9.4 oz)   11/03/23 94.3 kg (208 lb)   10/30/23 92.5 kg (204 lb)   Chronic. Echo 9/2023 - EF 66% moderate diastolic dysfunction   Plan as per essential HTN      CAD (coronary artery disease)  Assessment & Plan  Patient with hx of CAD s/p CABG on Eliquis 5 mg BID at home, which was held due to patient's report of melena and inpatient labs showing worsening thrombocytopenia in the setting of heavy alcohol abuse. Platelet count continues to uptrend, with stable hemoglobin and no recurrence of bleeding or melena. Continue with Eliquis    GERD (gastroesophageal reflux disease)  Assessment & Plan  Patient has hx of upper GI bleed and reported dark stools in the past few weeks prior to admission. No active bleeding. Hemoglobin stable. Protonix 40mg IV       Paroxysmal atrial fibrillation with rapid ventricular response (HCC)  Assessment & Plan  Chronic, stable. Plan:  Continue Eliquis 5 mg BID  Continue lopressor 25mg BID     Dyslipidemia  Assessment & Plan  Chronic. Continue Pravastatin 40 mg daily      Essential hypertension  Assessment & Plan  Chronic, stable. Home meds: Lisinopril 10 mg QD & Lopressor 25 mg BID. Lisinopril held in the setting of elevated creatinine. Creatinine improved. Lopressor 25 mg twice daily and Lisinopril 10 mg QD were then restarted   Continue Lopressor 25mg BID   Continue with lisinopril 10mg QD      Type 2 diabetes mellitus ([address])  Assessment & Plan  Lab Results   Component Value Date    HGBA1C 6.1 (H) 09/20/2023   Adequate glycemic control with ISS and regular diet.   Patient was on home metformin during his stay in the hospital  Continue home Metformin 500mg PO QD with breakfast      Constipation-resolved as of 11/21/2023  Assessment & Plan  Patient reporting melena at home, has not had a BM in 4-5 days before admission. Received Miralax 17g PO QD and reports normal bowel movements. Condition at Discharge: stable      Discharge Day Visit / Exam:      Vitals: Blood Pressure: 157/89 (11/21/23 0751)  Pulse: 79 (11/21/23 0751)  Temperature: 98.9 °F (37.2 °C) (11/21/23 0751)  Temp Source: Oral (11/21/23 0751)  Respirations: 16 (11/21/23 0751)  Height: 6' 2" (188 cm) (11/17/23 1657)  Weight - Scale: 92.8 kg (204 lb 9.4 oz) (11/20/23 0600)  SpO2: 96 % (11/21/23 1100)  Exam:   Physical Exam  Constitutional:       General: He is not in acute distress. Appearance: Normal appearance. He is not ill-appearing, toxic-appearing or diaphoretic. Neck:      Vascular: No carotid bruit. Cardiovascular:      Rate and Rhythm: Normal rate and regular rhythm. Pulses: Normal pulses. Heart sounds: Normal heart sounds. No murmur heard. No friction rub. No gallop. Pulmonary:      Effort: Pulmonary effort is normal. No respiratory distress. Breath sounds: Normal breath sounds. No stridor. No wheezing, rhonchi or rales. Chest:      Chest wall: No tenderness. Abdominal:      General: Bowel sounds are normal. There is no distension. Palpations: Abdomen is soft. There is no mass. Tenderness: There is no abdominal tenderness. There is no right CVA tenderness, left CVA tenderness, guarding or rebound. Hernia: No hernia is present. Musculoskeletal:         General: Tenderness present. No swelling, deformity or signs of injury. Cervical back: Normal range of motion. No rigidity or tenderness. Right lower leg: No edema. Left lower leg: No edema. Comments: Tenderness mid lower back   Lymphadenopathy:      Cervical: No cervical adenopathy. Skin:     General: Skin is warm. Capillary Refill: Capillary refill takes less than 2 seconds. Coloration: Skin is not jaundiced or pale. Findings: No bruising, erythema, lesion or rash. Neurological:      General: No focal deficit present. Mental Status: He is alert and oriented to person, place, and time. Mental status is at baseline. Cranial Nerves: No cranial nerve deficit. Sensory: No sensory deficit. Motor: No weakness. Coordination: Coordination normal.      Gait: Gait normal.      Deep Tendon Reflexes: Reflexes normal.   Psychiatric:         Mood and Affect: Mood normal.          Discussion with Family: yes  Patient Information Sharing: With the consent of Pastor Arroyo, their loved ones  were notified today by inpatient team of the patient’s condition and current plan. All questions answered. Discharge instructions/Information to patient and family:   See after visit summary for information provided to patient and family. Discharge Medications:     Medication List      START taking these medications     lidocaine 5 %; Commonly known as: Edwin Muck; Apply 1 patch topically over   12 hours daily Remove & Discard patch within 12 hours or as directed by MD   Do not start before November 22, 2023.; Start taking on: November 22, 2023   sodium chloride 1 g tablet; Take 2 tablets (2 g total) by mouth 4 (four)   times a day (with meals and at bedtime)     CHANGE how you take these medications     albuterol 90 mcg/act inhaler; Commonly known as: Ventolin HFA; Inhale 2   puffs every 4 (four) hours as needed for wheezing; What changed: Another   medication with the same name was removed. Continue taking this   medication, and follow the directions you see here.      CONTINUE taking these medications     apixaban 5 mg; Commonly known as: ELIQUIS; Take 1 tablet (5 mg total) by   mouth 2 (two) times a day   aspirin 81 mg EC tablet; Commonly known as: ECOTRIN LOW STRENGTH   Breo Ellipta 200-25 mcg/actuation inhaler; Generic drug:   fluticasone-vilanterol; Inhale 1 puff daily Rinse mouth after use. ferrous sulfate 325 (65 Fe) mg tablet; Take 1 tablet (325 mg total) by   mouth daily with breakfast   folic acid 1 mg tablet; Commonly known as: FOLVITE; TAKE 1 TABLET DAILY   gabapentin 300 mg capsule; Commonly known as: NEURONTIN; TAKE ONE   CAPSULE THREE TIMES DAILY   lisinopril 20 mg tablet; Commonly known as: ZESTRIL; Take 0.5 tablets   (10 mg total) by mouth daily   magnesium Oxide 400 mg Tabs; Commonly known as: MAG-OX; TAKE 1 TABLET   TWO TIMES A DAY   metFORMIN 500 mg tablet; Commonly known as: GLUCOPHAGE; Take 1 tablet   (500 mg total) by mouth daily with breakfast Do not start before September 23, 2023. metoprolol tartrate 25 mg tablet; Commonly known as: LOPRESSOR; Take 1   tablet (25 mg total) by mouth every 12 (twelve) hours   naltrexone 50 mg tablet; Commonly known as: REVIA; Take 1 tablet (50 mg   total) by mouth daily   nicotine 21 mg/24 hr TD 24 hr patch; Commonly known as: Radene Ambrosia;   Place 1 patch on the skin over 24 hours every 24 hours   ONE TOUCH ULTRA MINI w/Device Kit   OneTouch Delica Lancets Fine Misc   pantoprazole 40 mg tablet; Commonly known as: PROTONIX; Take 1 tablet   (40 mg total) by mouth 2 (two) times a day   polyethylene glycol 4000 mL solution; Commonly known as: COLYTE; Take   4,000 mL by mouth once for 1 dose Take 4000 mL by mouth once for 1 dose.    Use as directed   pravastatin 40 mg tablet; Commonly known as: PRAVACHOL; TAKE 1 TABLET   DAILY WITH DINNER   ranolazine 500 mg 12 hr tablet; Commonly known as: RANEXA; TAKE 1 TABLET   EVERY 12 HOURS   tamsulosin 0.4 mg; Commonly known as: FLOMAX; TAKE 1 CAPSULE DAILY   varenicline 1 mg tablet; Commonly known as: CHANTIX; TAKE 1 TABLET 2   TIMES A DAY   vitamin B-1 100 MG Tabs; TAKE 1 TABLET DAILY        Disposition:   Home     For Discharges to [address] [place] [address] SNF:   ·       Not Applicable to this Patient - Not Applicable to this Patient     Discharge Statement:  I spent 30 minutes discharging the patient. This time was spent on the day of discharge. I had direct contact with the patient on the day of discharge. Greater than 50% of the total time was spent examining patient, answering all patient questions, arranging and discussing plan of care with patient as well as directly providing post-discharge instructions. Additional time then spent on discharge activities.      ** Please Note: This note has been constructed using a voice recognition system **     [facility], [place]   11/21/23  1:39 PM

## 2023-11-21 NOTE — DISCHARGE INSTR - AVS FIRST PAGE
Continue salt tablets 2 g 4 times a day and fluid restriction 45 ounces per day. Repeat blood work on Friday.   Office will call to arrange for follow-up

## 2023-11-21 NOTE — PROGRESS NOTES
NEPHROLOGY PROGRESS NOTE   Fransisco Failing 66 y.o. male MRN: [id number]  Unit/Bed#: [address] [place] [address] Encounter: [id number]    ASSESSMENT & PLAN:  66 y.o. male with history of COPD, alcohol abuse, hyperlipidemia, hypertension, A-fib, prostate cancer status postradiation, CAD status post CABG, diabetes mellitus type 2 who was admitted to [facility] after presenting with status post fall 4 days prior to admission    Hyponatremia  -Admission Sodium: 125 meq/L  -Baseline Sodium: 127 to 132 meq/L   -Work Up: Urine Na:20, Urine Osmolality  288, Serum Osmolality 277, TSH in September was slightly low at 0.2 but free T4 was normal   -Etiology: Hyponatremia due to volume depletion, poor solute intake and excessive alcohol intake. Urine studies not suggestive of SIADH, not on any medications which would cause SIADH  -Hospital Course: Status post treatment with 1.8% saline on 11/20 a.m. was started on salt tablets on 11/20  -Plan:   Low sodium level was 129 meq/L despite initiation of salt tablet 2 g 3 times daily on 11/20. Due to low sodium, increased salt tablet to 2 g 4 times daily today, stressed on fluid restriction 1.5 L/day. Renal function is stable at creatinine 1.07 mg/dL. Patient wants to go home, okay for discharge from nephrology side with plan to repeat BMP coming Friday. Stressed on compliance with repeat BMP    Hypomagnesemia: Magnesium level 1.7 improving but still low: Replaced with 4 g of IV magnesium sulfate. Recommend magnesium oxide 400 mg daily for 2 weeks on discharge     Primary hypertension  -Home medication include lisinopril 10 mg daily, metoprolol 25 mg twice daily  -Blood pressure is elevated  -Currently on lisinopril 10 mg daily, metoprolol 25 mg twice daily. Restarted on medications today, monitor response.   If blood pressure is persistently elevated will consider increasing the dose of lisinopril     Chronic alcohol abuse-management per primary team  Back pain status post fall resulting in rib fractures right 10th and 11th ribs while intoxicated 4 days prior to admission, management per primary team     History of prostate cancer status postradiation    Renal function: Baseline creatinine appears to be around 1.0 to 1.2 mg/dL, creatinine was elevated to 1.37 in 11/19 but does not meet the criteria for acute kidney injury. Renal function currently at creatinine 1.07 mg/dL, continue to monitor     Others: Paroxysmal A-fib/CAD/dyslipidemia/COPD/diabetes mellitus type 2-management per primary team    Discussed with primary team regarding sodium level improving slowly to 129 and to increase the dose of salt tablets to 2 g 4 times daily and regarding plan for discharge and agreed with the plan. SUBJECTIVE:  No new complaints. No chest pain or shortness of breath, no nausea vomiting    OBJECTIVE:  Current Weight: Weight - Scale: 92.8 kg (204 lb 9.4 oz)  Vitals:    11/21/23 0751   BP: 157/89   Pulse: 79   Resp: 16   Temp: 98.9 °F (37.2 °C)   SpO2: 96%       Intake/Output Summary (Last 24 hours) at 11/21/2023 0834  Last data filed at 11/21/2023 8151  Gross per 24 hour   Intake 480 ml   Output 2175 ml   Net -1695 ml       Physical Exam  General:  Ill looking, awake. Eyes: Conjunctivae pink,  Sclera anicteric  ENT: lips and mucous membranes moist  Neck: supple   Chest: Clear to Auscultation both lungs,  no crackles, ronchus or wheezing. CVS: S1 & S2 present, normal rate, regular rhythm, no murmur.   Abdomen: soft, non-tender, non-distended, Bowel sounds normoactive  Extremities: no edema of  legs  Skin: Right flank contusion/bruise  Neuro: awake, alert, oriented x 3   Psych: Mood and affect appropriate     Medications:    Current Facility-Administered Medications:     acetaminophen (TYLENOL) tablet 975 mg, 975 mg, Oral, Q8H [address], Kai Claudiao, DO, 975 mg at 11/21/23 0517    albuterol (PROVENTIL HFA,VENTOLIN HFA) inhaler 2 puff, 2 puff, Inhalation, Q4H PRN, Aftab Bennett MD    albuterol inhalation solution 2.5 mg, 2.5 mg, Nebulization, Q6H PRN, Maye Gomez MD    ferrous sulfate tablet 325 mg, 325 mg, Oral, Daily With Breakfast, Maye Gomez MD, 325 mg at 11/21/23 0832    fluticasone-vilanterol 200-25 mcg/actuation 1 puff, 1 puff, Inhalation, Daily, Maye Gomez MD, 1 puff at 58/46/23 3482    folic acid (FOLVITE) tablet 1 mg, 1 mg, Oral, Daily, Maye Gomez MD, 1 mg at 11/21/23 1521    gabapentin (NEURONTIN) capsule 300 mg, 300 mg, Oral, TID, Maye Gomez MD, 300 mg at 11/21/23 5763    hydrOXYzine HCL (ATARAX) tablet 25 mg, 25 mg, Oral, Q8H PRN, Brandy Revankar, DO    insulin lispro (HumaLOG) 100 units/mL subcutaneous injection 1-5 Units, 1-5 Units, Subcutaneous, 4x Daily (AC & HS), 1 Units at 11/20/23 2104 **AND** Fingerstick Glucose (POCT), , , 4x Daily AC and at bedtime, Maye Gomez MD    lidocaine (LIDODERM) 5 % patch 1 patch, 1 patch, Topical, Daily, Maye Gomez MD, 1 patch at 11/21/23 0831    lisinopril (ZESTRIL) tablet 10 mg, 10 mg, Oral, Daily, Daniel Hernandez MD, 10 mg at 11/21/23 8761    methocarbamol (ROBAXIN) tablet 500 mg, 500 mg, Oral, Q6H [facility], [place] Esters, DO, 500 mg at 11/21/23 0033    metoprolol tartrate (LOPRESSOR) tablet 25 mg, 25 mg, Oral, Q12H [facility], Maye Gomez MD, 25 mg at 11/21/23 1675    multivitamin-minerals (CENTRUM) tablet 1 tablet, 1 tablet, Oral, Daily, Maye Gomez MD, 1 tablet at 11/21/23 5271    nicotine (NICODERM CQ) 21 mg/24 hr TD 24 hr patch 1 patch, 1 patch, Transdermal, Daily, Maye Gomez MD, 1 patch at 11/20/23 0813    pantoprazole (PROTONIX) EC tablet 40 mg, 40 mg, Oral, Early Morning, Brandy Revrandyar, DO, 40 mg at 11/21/23 0518    polyethylene glycol (MIRALAX) packet 17 g, 17 g, Oral, Daily, [place] Hilda, DO, 17 g at 11/21/23 0831    pravastatin (PRAVACHOL) tablet 40 mg, 40 mg, Oral, Daily With Meseret Lemus MD, 40 mg at 11/20/23 1634    ranolazine (RANEXA) 12 hr tablet 500 mg, 500 mg, Oral, Q12H, Calin Mi MD, 500 mg at 11/21/23 0518    sodium chloride tablet 2 g, 2 g, Oral, TID With Meals, Mitra Oliver MD, 2 g at 11/21/23 1265    tamsulosin (FLOMAX) capsule 0.4 mg, 0.4 mg, Oral, Daily, Calin Mi MD, 0.4 mg at 11/21/23 3525    thiamine tablet 100 mg, 100 mg, Oral, Daily, Calin Mi MD, 100 mg at 11/21/23 0832    traMADol (ULTRAM) tablet 50 mg, 50 mg, Oral, Q6H PRN, Brandy Connors DO, 50 mg at 11/21/23 0042    varenicline (CHANTIX) tablet 1 mg, 1 mg, Oral, BID, Calin Mi MD    Invasive Devices:        Lab Results:   Results from last 7 days   Lab Units 11/21/23  0536 11/20/23  1033 11/20/23  0600 11/19/23  1340 11/19/23  0531 11/18/23  0859   WBC Thousand/uL 6.99  --  6.34  --  4.52 5.77   HEMOGLOBIN g/dL 12.6  --  13.0  --  14.0 13.5   HEMATOCRIT % 37.3  --  37.9  --  41.1 38.9   PLATELETS Thousands/uL 71*  --  67*  --  37* 85*   POTASSIUM mmol/L 4.9 4.4 4.7   < > 5.3 4.5   CHLORIDE mmol/L 97 98 95*   < > 96 97   CO2 mmol/L 24 25 27   < > 20* 27   BUN mg/dL 21 20 21   < > 22 17   CREATININE mg/dL 1.07 1.14 1.01   < > 1.13 1.23   CALCIUM mg/dL 9.2 8.6 9.2   < > 8.6 8.6   MAGNESIUM mg/dL 1.7*  --  1.4*  --  2.2 1.2*   PHOSPHORUS mg/dL  --   --   --   --   --  2.5   ALK PHOS U/L  --   --  59  --  64 72   ALT U/L  --   --  37  --  46 68*   AST U/L  --   --  32  --  49* 81*    < > = values in this interval not displayed. Previous work up:         Portions of the record may have been created with voice recognition software. Occasional wrong word or "sound a like" substitutions may have occurred due to the inherent limitations of voice recognition software. Read the chart carefully and recognize, using context, where substitutions have occurred. If you have any questions, please contact the dictating provider.

## 2023-11-21 NOTE — PROGRESS NOTES
Attempted contacting patient to schedule TCM appointment, patient states he will call Monday and disconnected the call.

## 2023-11-21 NOTE — PLAN OF CARE
Problem: PAIN - ADULT  Goal: Verbalizes/displays adequate comfort level or baseline comfort level  Description: Interventions:  - Encourage patient to monitor pain and request assistance  - Assess pain using appropriate pain scale  - Administer analgesics based on type and severity of pain and evaluate response  - Implement non-pharmacological measures as appropriate and evaluate response  - Consider cultural and social influences on pain and pain management  - Notify physician/advanced practitioner if interventions unsuccessful or patient reports new pain  11/21/2023 1155 by Becky Jewell RN  Outcome: Progressing  11/21/2023 1155 by Becky Jewell RN  Outcome: Progressing     Problem: SAFETY ADULT  Goal: Patient will remain free of falls  Description: INTERVENTIONS:  - Educate patient/family on patient safety including physical limitations  - Instruct patient to call for assistance with activity   - Consult OT/PT to assist with strengthening/mobility   - Keep Call bell within reach  - Keep bed low and locked with side rails adjusted as appropriate  - Keep care items and personal belongings within reach  - Initiate and maintain comfort rounds  - Make Fall Risk Sign visible to staff  - Offer Toileting every 2 Hours, in advance of need  - Initiate/Maintain bed alarm  - Obtain necessary fall risk management equipment: walker  - Apply yellow socks and bracelet for high fall risk patients  - Consider moving patient to room near nurses station  11/21/2023 1155 by Becky Jewell RN  Outcome: Progressing  11/21/2023 1155 by Becky Jewell RN  Outcome: Progressing  Goal: Maintain or return to baseline ADL function  Description: INTERVENTIONS:  -  Assess patient's ability to carry out ADLs; assess patient's baseline for ADL function and identify physical deficits which impact ability to perform ADLs (bathing, care of mouth/teeth, toileting, grooming, dressing, etc.)  - Assess/evaluate cause of self-care deficits   - Assess range of motion  - Assess patient's mobility; develop plan if impaired  - Assess patient's need for assistive devices and provide as appropriate  - Encourage maximum independence but intervene and supervise when necessary  - Involve family in performance of ADLs  - Assess for home care needs following discharge   - Consider OT consult to assist with ADL evaluation and planning for discharge  - Provide patient education as appropriate  11/21/2023 1155 by Serg Sawyer RN  Outcome: Progressing  11/21/2023 1155 by Serg Sawyer RN  Outcome: Progressing  Goal: Maintains/Returns to pre admission functional level  Description: INTERVENTIONS:  - Perform AM-PAC 6 Click Basic Mobility/ Daily Activity assessment daily.  - Set and communicate daily mobility goal to care team and patient/family/caregiver.    - Collaborate with rehabilitation services on mobility goals if consulted    - Ambulate patient 3 times a day  - Out of bed to chair 3 times a day   - Out of bed for meals 3 times a day  - Out of bed for toileting  - Record patient progress and toleration of activity level   11/21/2023 1155 by Serg Sawyer RN  Outcome: Progressing  11/21/2023 1155 by Serg Sawyer RN  Outcome: Progressing     Problem: DISCHARGE PLANNING  Goal: Discharge to home or other facility with appropriate resources  Description: INTERVENTIONS:  - Identify barriers to discharge w/patient and caregiver  - Arrange for needed discharge resources and transportation as appropriate  - Identify discharge learning needs (meds, wound care, etc.)  - Arrange for interpretive services to assist at discharge as needed  - Refer to Case Management Department for coordinating discharge planning if the patient needs post-hospital services based on physician/advanced practitioner order or complex needs related to functional status, cognitive ability, or social support system  11/21/2023 1155 by Serg Sawyer RN  Outcome: Progressing  11/21/2023 1155 by Serg Sawyer RN  Outcome: Progressing     Problem: Knowledge Deficit  Goal: Patient/family/caregiver demonstrates understanding of disease process, treatment plan, medications, and discharge instructions  Description: Complete learning assessment and assess knowledge base.   Interventions:  - Provide teaching at level of understanding  - Provide teaching via preferred learning methods  11/21/2023 1155 by Delmar Napoles RN  Outcome: Progressing  11/21/2023 1155 by Delmar Napoles RN  Outcome: Progressing     Problem: RESPIRATORY - ADULT  Goal: Achieves optimal ventilation and oxygenation  Description: INTERVENTIONS:  - Assess for changes in respiratory status  - Assess for changes in mentation and behavior  - Position to facilitate oxygenation and minimize respiratory effort  - Oxygen administered by appropriate delivery if ordered  - Initiate smoking cessation education as indicated  - Encourage broncho-pulmonary hygiene including cough, deep breathe, Incentive Spirometry  - Assess the need for suctioning and aspirate as needed  - Assess and instruct to report SOB or any respiratory difficulty  - Respiratory Therapy support as indicated  11/21/2023 1155 by Delmar Napoles RN  Outcome: Progressing  11/21/2023 1155 by Delmar Napoles RN  Outcome: Progressing

## 2023-11-21 NOTE — ASSESSMENT & PLAN NOTE
Chronic, stable. Home meds: Lisinopril 10 mg QD & Lopressor 25 mg BID. Lisinopril held in the setting of elevated creatinine. Creatinine improved.   Lopressor 25 mg twice daily and Lisinopril 10 mg QD were then restarted   Continue Lopressor 25mg BID   Continue with lisinopril 10mg QD

## 2023-11-22 ENCOUNTER — TELEPHONE (OUTPATIENT)
Dept: NEPHROLOGY | Facility: HOSPITAL | Age: 63
End: 2023-11-22

## 2023-11-22 NOTE — TELEPHONE ENCOUNTER
----- Message from Francisco Hess MD sent at 11/21/2023  4:23 PM EST -----  Patient discharged from [facility] today, was seen for hyponatremia and discharged on salt tablets 2 g 4 times a day. I have ordered for BMP to be done for Hypernatremia coming Friday, please remind patient to do the BMP. Please arrange for follow-up with an advanced practitioner or with one of the providers to be done in next 1 to 4 weeks.   Please place on cancellation list if no current availability

## 2023-11-24 NOTE — TELEPHONE ENCOUNTER
Called pt to schedule a hospital follow up. Pt stated he is not going to the [place] ([place] office. I advised pt he could do his follow up appt with Dr. Stephen Corrales in [place] but pt stated he absolutely is not going to [place] ([place] There is no available appts in [place]. Pt added to wait list. I also advised pt of lab work and he said he is going Monday.

## 2023-11-27 ENCOUNTER — ANESTHESIA (OUTPATIENT)
Dept: ANESTHESIOLOGY | Facility: HOSPITAL | Age: 63
End: 2023-11-27

## 2023-11-27 ENCOUNTER — ANESTHESIA EVENT (OUTPATIENT)
Dept: ANESTHESIOLOGY | Facility: HOSPITAL | Age: 63
End: 2023-11-27

## 2023-11-28 ENCOUNTER — TELEPHONE (OUTPATIENT)
Dept: GASTROENTEROLOGY | Facility: CLINIC | Age: 63
End: 2023-11-28

## 2023-11-28 NOTE — TELEPHONE ENCOUNTER
Left message confirmation procedure. He is to have a , will be called day prior with arrival time and if he doesn't have prep instructions, he should call. Reminded him to take metformin day before as normally, hold morning of.

## 2023-11-30 ENCOUNTER — HOSPITAL ENCOUNTER (INPATIENT)
Facility: HOSPITAL | Age: 63
LOS: 3 days | Discharge: HOME | DRG: 683 | End: 2023-12-03
Attending: EMERGENCY MEDICINE | Admitting: INTERNAL MEDICINE
Payer: COMMERCIAL

## 2023-11-30 ENCOUNTER — APPOINTMENT (EMERGENCY)
Dept: RADIOLOGY | Facility: HOSPITAL | Age: 63
DRG: 683 | End: 2023-11-30
Payer: COMMERCIAL

## 2023-11-30 DIAGNOSIS — F10.929 ALCOHOLIC INTOXICATION WITH COMPLICATION (HCC): ICD-10-CM

## 2023-11-30 DIAGNOSIS — E87.1 HYPONATREMIA: ICD-10-CM

## 2023-11-30 DIAGNOSIS — J44.9 CHRONIC OBSTRUCTIVE PULMONARY DISEASE, UNSPECIFIED COPD TYPE (HCC): Chronic | ICD-10-CM

## 2023-11-30 DIAGNOSIS — I95.9 HYPOTENSION: ICD-10-CM

## 2023-11-30 DIAGNOSIS — N17.9 AKI (ACUTE KIDNEY INJURY) (HCC): Primary | ICD-10-CM

## 2023-11-30 DIAGNOSIS — Z72.0 NICOTINE ABUSE: Chronic | ICD-10-CM

## 2023-11-30 DIAGNOSIS — F10.10 ALCOHOL ABUSE: ICD-10-CM

## 2023-11-30 DIAGNOSIS — E87.1 CHRONIC HYPONATREMIA: ICD-10-CM

## 2023-11-30 LAB
ALBUMIN SERPL BCP-MCNC: 3.8 G/DL (ref 3.5–5)
ALP SERPL-CCNC: 65 U/L (ref 34–104)
ALT SERPL W P-5'-P-CCNC: 55 U/L (ref 7–52)
ANION GAP SERPL CALCULATED.3IONS-SCNC: 17 MMOL/L
APTT PPP: 31 SECONDS (ref 23–37)
AST SERPL W P-5'-P-CCNC: 55 U/L (ref 13–39)
BASOPHILS # BLD AUTO: 0.09 THOUSANDS/ÂΜL (ref 0–0.1)
BASOPHILS NFR BLD AUTO: 1 % (ref 0–1)
BILIRUB SERPL-MCNC: 0.79 MG/DL (ref 0.2–1)
BUN SERPL-MCNC: 30 MG/DL (ref 5–25)
CALCIUM SERPL-MCNC: 8.1 MG/DL (ref 8.4–10.2)
CHLORIDE SERPL-SCNC: 91 MMOL/L (ref 96–108)
CO2 SERPL-SCNC: 21 MMOL/L (ref 21–32)
CREAT SERPL-MCNC: 2.72 MG/DL (ref 0.6–1.3)
EOSINOPHIL # BLD AUTO: 0.06 THOUSAND/ÂΜL (ref 0–0.61)
EOSINOPHIL NFR BLD AUTO: 1 % (ref 0–6)
ERYTHROCYTE [DISTWIDTH] IN BLOOD BY AUTOMATED COUNT: 14.5 % (ref 11.6–15.1)
ETHANOL SERPL-MCNC: 419 MG/DL
FLUAV RNA RESP QL NAA+PROBE: NEGATIVE
FLUBV RNA RESP QL NAA+PROBE: NEGATIVE
GFR SERPL CREATININE-BSD FRML MDRD: 24 ML/MIN/1.73SQ M
GLUCOSE SERPL-MCNC: 60 MG/DL (ref 65–140)
HCT VFR BLD AUTO: 37.6 % (ref 36.5–49.3)
HGB BLD-MCNC: 13.3 G/DL (ref 12–17)
IMM GRANULOCYTES # BLD AUTO: 0.04 THOUSAND/UL (ref 0–0.2)
IMM GRANULOCYTES NFR BLD AUTO: 1 % (ref 0–2)
INR PPP: 1.03 (ref 0.84–1.19)
LACTATE SERPL-SCNC: 1.9 MMOL/L (ref 0.5–2)
LIPASE SERPL-CCNC: 35 U/L (ref 11–82)
LYMPHOCYTES # BLD AUTO: 1.58 THOUSANDS/ÂΜL (ref 0.6–4.47)
LYMPHOCYTES NFR BLD AUTO: 23 % (ref 14–44)
MAGNESIUM SERPL-MCNC: 1.7 MG/DL (ref 1.9–2.7)
MCH RBC QN AUTO: 34.5 PG (ref 26.8–34.3)
MCHC RBC AUTO-ENTMCNC: 35.4 G/DL (ref 31.4–37.4)
MCV RBC AUTO: 97 FL (ref 82–98)
MONOCYTES # BLD AUTO: 0.41 THOUSAND/ÂΜL (ref 0.17–1.22)
MONOCYTES NFR BLD AUTO: 6 % (ref 4–12)
NEUTROPHILS # BLD AUTO: 4.59 THOUSANDS/ÂΜL (ref 1.85–7.62)
NEUTS SEG NFR BLD AUTO: 68 % (ref 43–75)
NRBC BLD AUTO-RTO: 0 /100 WBCS
PLATELET # BLD AUTO: 162 THOUSANDS/UL (ref 149–390)
PMV BLD AUTO: 10 FL (ref 8.9–12.7)
POTASSIUM SERPL-SCNC: 4.2 MMOL/L (ref 3.5–5.3)
PROT SERPL-MCNC: 7 G/DL (ref 6.4–8.4)
PROTHROMBIN TIME: 13.7 SECONDS (ref 11.6–14.5)
RBC # BLD AUTO: 3.86 MILLION/UL (ref 3.88–5.62)
RSV RNA RESP QL NAA+PROBE: NEGATIVE
SARS-COV-2 RNA RESP QL NAA+PROBE: NEGATIVE
SODIUM SERPL-SCNC: 129 MMOL/L (ref 135–147)
WBC # BLD AUTO: 6.77 THOUSAND/UL (ref 4.31–10.16)

## 2023-11-30 PROCEDURE — 85610 PROTHROMBIN TIME: CPT | Performed by: EMERGENCY MEDICINE

## 2023-11-30 PROCEDURE — 83690 ASSAY OF LIPASE: CPT | Performed by: EMERGENCY MEDICINE

## 2023-11-30 PROCEDURE — 80053 COMPREHEN METABOLIC PANEL: CPT | Performed by: EMERGENCY MEDICINE

## 2023-11-30 PROCEDURE — G1004 CDSM NDSC: HCPCS

## 2023-11-30 PROCEDURE — 82272 OCCULT BLD FECES 1-3 TESTS: CPT

## 2023-11-30 PROCEDURE — 99291 CRITICAL CARE FIRST HOUR: CPT | Performed by: EMERGENCY MEDICINE

## 2023-11-30 PROCEDURE — 82077 ASSAY SPEC XCP UR&BREATH IA: CPT | Performed by: EMERGENCY MEDICINE

## 2023-11-30 PROCEDURE — 83605 ASSAY OF LACTIC ACID: CPT | Performed by: EMERGENCY MEDICINE

## 2023-11-30 PROCEDURE — 83735 ASSAY OF MAGNESIUM: CPT

## 2023-11-30 PROCEDURE — 93005 ELECTROCARDIOGRAM TRACING: CPT

## 2023-11-30 PROCEDURE — 0241U HB NFCT DS VIR RESP RNA 4 TRGT: CPT | Performed by: EMERGENCY MEDICINE

## 2023-11-30 PROCEDURE — 36415 COLL VENOUS BLD VENIPUNCTURE: CPT | Performed by: EMERGENCY MEDICINE

## 2023-11-30 PROCEDURE — 74176 CT ABD & PELVIS W/O CONTRAST: CPT

## 2023-11-30 PROCEDURE — 85025 COMPLETE CBC W/AUTO DIFF WBC: CPT | Performed by: EMERGENCY MEDICINE

## 2023-11-30 PROCEDURE — 94640 AIRWAY INHALATION TREATMENT: CPT

## 2023-11-30 PROCEDURE — 99285 EMERGENCY DEPT VISIT HI MDM: CPT

## 2023-11-30 PROCEDURE — 85730 THROMBOPLASTIN TIME PARTIAL: CPT | Performed by: EMERGENCY MEDICINE

## 2023-11-30 RX ORDER — ONDANSETRON 2 MG/ML
4 INJECTION INTRAMUSCULAR; INTRAVENOUS EVERY 6 HOURS PRN
Status: DISCONTINUED | OUTPATIENT
Start: 2023-11-30 | End: 2023-12-03 | Stop reason: HOSPADM

## 2023-11-30 RX ORDER — PRAVASTATIN SODIUM 40 MG
40 TABLET ORAL
Status: DISCONTINUED | OUTPATIENT
Start: 2023-12-01 | End: 2023-12-03 | Stop reason: HOSPADM

## 2023-11-30 RX ORDER — NICOTINE 21 MG/24HR
1 PATCH, TRANSDERMAL 24 HOURS TRANSDERMAL EVERY 24 HOURS
Status: DISCONTINUED | OUTPATIENT
Start: 2023-11-30 | End: 2023-11-30

## 2023-11-30 RX ORDER — FOLIC ACID 1 MG/1
1 TABLET ORAL DAILY
Status: DISCONTINUED | OUTPATIENT
Start: 2023-12-01 | End: 2023-12-03 | Stop reason: HOSPADM

## 2023-11-30 RX ORDER — ALBUTEROL SULFATE 2.5 MG/3ML
5 SOLUTION RESPIRATORY (INHALATION) ONCE
Status: COMPLETED | OUTPATIENT
Start: 2023-11-30 | End: 2023-11-30

## 2023-11-30 RX ORDER — VARENICLINE TARTRATE 1 MG/1
1 TABLET, FILM COATED ORAL 2 TIMES DAILY
Status: DISCONTINUED | OUTPATIENT
Start: 2023-11-30 | End: 2023-12-03 | Stop reason: HOSPADM

## 2023-11-30 RX ORDER — IPRATROPIUM BROMIDE AND ALBUTEROL SULFATE 2.5; .5 MG/3ML; MG/3ML
3 SOLUTION RESPIRATORY (INHALATION)
Status: DISCONTINUED | OUTPATIENT
Start: 2023-12-01 | End: 2023-12-01

## 2023-11-30 RX ORDER — ASPIRIN 81 MG/1
81 TABLET, CHEWABLE ORAL DAILY
Status: DISCONTINUED | OUTPATIENT
Start: 2023-12-01 | End: 2023-12-03 | Stop reason: HOSPADM

## 2023-11-30 RX ORDER — LANOLIN ALCOHOL/MO/W.PET/CERES
100 CREAM (GRAM) TOPICAL DAILY
Status: DISCONTINUED | OUTPATIENT
Start: 2023-12-01 | End: 2023-12-03 | Stop reason: HOSPADM

## 2023-11-30 RX ORDER — INSULIN LISPRO 100 [IU]/ML
1-6 INJECTION, SOLUTION INTRAVENOUS; SUBCUTANEOUS
Status: DISCONTINUED | OUTPATIENT
Start: 2023-11-30 | End: 2023-12-01

## 2023-11-30 RX ORDER — FLUTICASONE FUROATE AND VILANTEROL 200; 25 UG/1; UG/1
1 POWDER RESPIRATORY (INHALATION) DAILY
Status: DISCONTINUED | OUTPATIENT
Start: 2023-12-01 | End: 2023-12-03 | Stop reason: HOSPADM

## 2023-11-30 RX ORDER — ACETAMINOPHEN 325 MG/1
650 TABLET ORAL EVERY 6 HOURS PRN
Status: DISCONTINUED | OUTPATIENT
Start: 2023-11-30 | End: 2023-12-03 | Stop reason: HOSPADM

## 2023-11-30 RX ORDER — TAMSULOSIN HYDROCHLORIDE 0.4 MG/1
0.4 CAPSULE ORAL DAILY
Status: DISCONTINUED | OUTPATIENT
Start: 2023-12-01 | End: 2023-12-03 | Stop reason: HOSPADM

## 2023-11-30 RX ORDER — ALBUTEROL SULFATE 2.5 MG/3ML
2.5 SOLUTION RESPIRATORY (INHALATION) EVERY 6 HOURS PRN
Status: DISCONTINUED | OUTPATIENT
Start: 2023-11-30 | End: 2023-12-03 | Stop reason: HOSPADM

## 2023-11-30 RX ORDER — RANOLAZINE 500 MG/1
500 TABLET, EXTENDED RELEASE ORAL EVERY 12 HOURS SCHEDULED
Status: DISCONTINUED | OUTPATIENT
Start: 2023-11-30 | End: 2023-12-03 | Stop reason: HOSPADM

## 2023-11-30 RX ORDER — NICOTINE 21 MG/24HR
1 PATCH, TRANSDERMAL 24 HOURS TRANSDERMAL DAILY
Status: DISCONTINUED | OUTPATIENT
Start: 2023-12-01 | End: 2023-12-03 | Stop reason: HOSPADM

## 2023-11-30 RX ORDER — IPRATROPIUM BROMIDE AND ALBUTEROL SULFATE 2.5; .5 MG/3ML; MG/3ML
3 SOLUTION RESPIRATORY (INHALATION) 3 TIMES DAILY
Status: DISCONTINUED | OUTPATIENT
Start: 2023-11-30 | End: 2023-11-30

## 2023-11-30 RX ORDER — SODIUM CHLORIDE 9 MG/ML
125 INJECTION, SOLUTION INTRAVENOUS CONTINUOUS
Status: DISCONTINUED | OUTPATIENT
Start: 2023-11-30 | End: 2023-12-02

## 2023-11-30 RX ORDER — PANTOPRAZOLE SODIUM 40 MG/1
40 TABLET, DELAYED RELEASE ORAL
Status: DISCONTINUED | OUTPATIENT
Start: 2023-12-01 | End: 2023-12-03 | Stop reason: HOSPADM

## 2023-11-30 RX ADMIN — IPRATROPIUM BROMIDE 0.5 MG: 0.5 SOLUTION RESPIRATORY (INHALATION) at 20:52

## 2023-11-30 RX ADMIN — ALBUTEROL SULFATE 5 MG: 2.5 SOLUTION RESPIRATORY (INHALATION) at 20:52

## 2023-11-30 RX ADMIN — SODIUM CHLORIDE 1000 ML: 0.9 INJECTION, SOLUTION INTRAVENOUS at 19:54

## 2023-11-30 NOTE — TELEPHONE ENCOUNTER
LM for pt to schedule hosp f/u - [facility] has several openings in [place]

## 2023-12-01 ENCOUNTER — APPOINTMENT (INPATIENT)
Dept: RADIOLOGY | Facility: HOSPITAL | Age: 63
DRG: 683 | End: 2023-12-01
Payer: COMMERCIAL

## 2023-12-01 LAB
2HR DELTA HS TROPONIN: -1 NG/L
4HR DELTA HS TROPONIN: -2 NG/L
ALBUMIN SERPL BCP-MCNC: 3.5 G/DL (ref 3.5–5)
ALP SERPL-CCNC: 56 U/L (ref 34–104)
ALT SERPL W P-5'-P-CCNC: 44 U/L (ref 7–52)
ANION GAP SERPL CALCULATED.3IONS-SCNC: 14 MMOL/L
AST SERPL W P-5'-P-CCNC: 42 U/L (ref 13–39)
ATRIAL RATE: 67 BPM
ATRIAL RATE: 68 BPM
ATRIAL RATE: 74 BPM
BACTERIA UR QL AUTO: ABNORMAL /HPF
BASOPHILS # BLD AUTO: 0.08 THOUSANDS/ÂΜL (ref 0–0.1)
BASOPHILS NFR BLD AUTO: 1 % (ref 0–1)
BILIRUB SERPL-MCNC: 0.62 MG/DL (ref 0.2–1)
BILIRUB UR QL STRIP: NEGATIVE
BUN SERPL-MCNC: 33 MG/DL (ref 5–25)
CALCIUM SERPL-MCNC: 7.8 MG/DL (ref 8.4–10.2)
CARDIAC TROPONIN I PNL SERPL HS: 31 NG/L
CARDIAC TROPONIN I PNL SERPL HS: 32 NG/L
CARDIAC TROPONIN I PNL SERPL HS: 33 NG/L
CHLORIDE SERPL-SCNC: 95 MMOL/L (ref 96–108)
CLARITY UR: CLEAR
CO2 SERPL-SCNC: 22 MMOL/L (ref 21–32)
COLOR UR: YELLOW
CREAT SERPL-MCNC: 2.45 MG/DL (ref 0.6–1.3)
EOSINOPHIL # BLD AUTO: 0.12 THOUSAND/ÂΜL (ref 0–0.61)
EOSINOPHIL NFR BLD AUTO: 2 % (ref 0–6)
ERYTHROCYTE [DISTWIDTH] IN BLOOD BY AUTOMATED COUNT: 14.5 % (ref 11.6–15.1)
GFR SERPL CREATININE-BSD FRML MDRD: 27 ML/MIN/1.73SQ M
GLUCOSE SERPL-MCNC: 110 MG/DL (ref 65–140)
GLUCOSE SERPL-MCNC: 130 MG/DL (ref 65–140)
GLUCOSE SERPL-MCNC: 133 MG/DL (ref 65–140)
GLUCOSE SERPL-MCNC: 136 MG/DL (ref 65–140)
GLUCOSE SERPL-MCNC: 149 MG/DL (ref 65–140)
GLUCOSE SERPL-MCNC: 89 MG/DL (ref 65–140)
GLUCOSE UR STRIP-MCNC: NEGATIVE MG/DL
HCT VFR BLD AUTO: 34.1 % (ref 36.5–49.3)
HGB BLD-MCNC: 11.8 G/DL (ref 12–17)
HGB UR QL STRIP.AUTO: NEGATIVE
HYALINE CASTS #/AREA URNS LPF: ABNORMAL /LPF
IMM GRANULOCYTES # BLD AUTO: 0.02 THOUSAND/UL (ref 0–0.2)
IMM GRANULOCYTES NFR BLD AUTO: 0 % (ref 0–2)
KETONES UR STRIP-MCNC: NEGATIVE MG/DL
LEUKOCYTE ESTERASE UR QL STRIP: NEGATIVE
LYMPHOCYTES # BLD AUTO: 1.05 THOUSANDS/ÂΜL (ref 0.6–4.47)
LYMPHOCYTES NFR BLD AUTO: 18 % (ref 14–44)
MAGNESIUM SERPL-MCNC: 1.8 MG/DL (ref 1.9–2.7)
MCH RBC QN AUTO: 33.9 PG (ref 26.8–34.3)
MCHC RBC AUTO-ENTMCNC: 34.6 G/DL (ref 31.4–37.4)
MCV RBC AUTO: 98 FL (ref 82–98)
MONOCYTES # BLD AUTO: 0.67 THOUSAND/ÂΜL (ref 0.17–1.22)
MONOCYTES NFR BLD AUTO: 12 % (ref 4–12)
NEUTROPHILS # BLD AUTO: 3.89 THOUSANDS/ÂΜL (ref 1.85–7.62)
NEUTS SEG NFR BLD AUTO: 67 % (ref 43–75)
NITRITE UR QL STRIP: NEGATIVE
NON-SQ EPI CELLS URNS QL MICRO: ABNORMAL /HPF
NRBC BLD AUTO-RTO: 0 /100 WBCS
P AXIS: 72 DEGREES
P AXIS: 81 DEGREES
P AXIS: 83 DEGREES
PH UR STRIP.AUTO: 5 [PH]
PHOSPHATE SERPL-MCNC: 4.7 MG/DL (ref 2.3–4.1)
PLATELET # BLD AUTO: 136 THOUSANDS/UL (ref 149–390)
PMV BLD AUTO: 10.3 FL (ref 8.9–12.7)
POTASSIUM SERPL-SCNC: 3.8 MMOL/L (ref 3.5–5.3)
PR INTERVAL: 132 MS
PR INTERVAL: 132 MS
PR INTERVAL: 136 MS
PROT SERPL-MCNC: 6.2 G/DL (ref 6.4–8.4)
PROT UR STRIP-MCNC: NEGATIVE MG/DL
QRS AXIS: 73 DEGREES
QRS AXIS: 87 DEGREES
QRS AXIS: 88 DEGREES
QRSD INTERVAL: 90 MS
QRSD INTERVAL: 92 MS
QRSD INTERVAL: 96 MS
QT INTERVAL: 406 MS
QT INTERVAL: 412 MS
QT INTERVAL: 414 MS
QTC INTERVAL: 431 MS
QTC INTERVAL: 435 MS
QTC INTERVAL: 459 MS
RBC # BLD AUTO: 3.48 MILLION/UL (ref 3.88–5.62)
RBC #/AREA URNS AUTO: ABNORMAL /HPF
SODIUM SERPL-SCNC: 131 MMOL/L (ref 135–147)
SP GR UR STRIP.AUTO: 1.02 (ref 1–1.03)
T WAVE AXIS: 47 DEGREES
T WAVE AXIS: 59 DEGREES
T WAVE AXIS: 67 DEGREES
UROBILINOGEN UR QL STRIP.AUTO: 1 E.U./DL
VENTRICULAR RATE: 67 BPM
VENTRICULAR RATE: 68 BPM
VENTRICULAR RATE: 74 BPM
WBC # BLD AUTO: 5.83 THOUSAND/UL (ref 4.31–10.16)
WBC #/AREA URNS AUTO: ABNORMAL /HPF

## 2023-12-01 PROCEDURE — 82948 REAGENT STRIP/BLOOD GLUCOSE: CPT

## 2023-12-01 PROCEDURE — 94760 N-INVAS EAR/PLS OXIMETRY 1: CPT

## 2023-12-01 PROCEDURE — 94640 AIRWAY INHALATION TREATMENT: CPT

## 2023-12-01 PROCEDURE — 93010 ELECTROCARDIOGRAM REPORT: CPT | Performed by: INTERNAL MEDICINE

## 2023-12-01 PROCEDURE — 93005 ELECTROCARDIOGRAM TRACING: CPT

## 2023-12-01 PROCEDURE — 84100 ASSAY OF PHOSPHORUS: CPT

## 2023-12-01 PROCEDURE — 71045 X-RAY EXAM CHEST 1 VIEW: CPT

## 2023-12-01 PROCEDURE — 99223 1ST HOSP IP/OBS HIGH 75: CPT | Performed by: INTERNAL MEDICINE

## 2023-12-01 PROCEDURE — 85025 COMPLETE CBC W/AUTO DIFF WBC: CPT

## 2023-12-01 PROCEDURE — 84484 ASSAY OF TROPONIN QUANT: CPT

## 2023-12-01 PROCEDURE — 80053 COMPREHEN METABOLIC PANEL: CPT

## 2023-12-01 PROCEDURE — 83735 ASSAY OF MAGNESIUM: CPT

## 2023-12-01 PROCEDURE — 81001 URINALYSIS AUTO W/SCOPE: CPT

## 2023-12-01 RX ORDER — MAGNESIUM SULFATE HEPTAHYDRATE 40 MG/ML
2 INJECTION, SOLUTION INTRAVENOUS ONCE
Status: COMPLETED | OUTPATIENT
Start: 2023-12-01 | End: 2023-12-01

## 2023-12-01 RX ORDER — LORAZEPAM 1 MG/1
2 TABLET ORAL ONCE
Status: COMPLETED | OUTPATIENT
Start: 2023-12-01 | End: 2023-12-01

## 2023-12-01 RX ORDER — POTASSIUM CHLORIDE 20 MEQ/1
20 TABLET, EXTENDED RELEASE ORAL ONCE
Status: COMPLETED | OUTPATIENT
Start: 2023-12-01 | End: 2023-12-01

## 2023-12-01 RX ORDER — LORAZEPAM 1 MG/1
1 TABLET ORAL EVERY 6 HOURS
Status: DISCONTINUED | OUTPATIENT
Start: 2023-12-01 | End: 2023-12-01

## 2023-12-01 RX ORDER — INSULIN LISPRO 100 [IU]/ML
1-6 INJECTION, SOLUTION INTRAVENOUS; SUBCUTANEOUS
Status: DISCONTINUED | OUTPATIENT
Start: 2023-12-01 | End: 2023-12-03 | Stop reason: HOSPADM

## 2023-12-01 RX ORDER — LORAZEPAM 1 MG/1
1 TABLET ORAL EVERY 12 HOURS
Status: DISCONTINUED | OUTPATIENT
Start: 2023-12-03 | End: 2023-12-01

## 2023-12-01 RX ORDER — LANOLIN ALCOHOL/MO/W.PET/CERES
400 CREAM (GRAM) TOPICAL 2 TIMES DAILY
Status: DISCONTINUED | OUTPATIENT
Start: 2023-12-01 | End: 2023-12-03 | Stop reason: HOSPADM

## 2023-12-01 RX ORDER — LEVALBUTEROL INHALATION SOLUTION 1.25 MG/3ML
1.25 SOLUTION RESPIRATORY (INHALATION) EVERY 6 HOURS SCHEDULED
Status: DISCONTINUED | OUTPATIENT
Start: 2023-12-01 | End: 2023-12-03

## 2023-12-01 RX ORDER — LEVALBUTEROL INHALATION SOLUTION 1.25 MG/3ML
1.25 SOLUTION RESPIRATORY (INHALATION) EVERY 6 HOURS SCHEDULED
Status: DISCONTINUED | OUTPATIENT
Start: 2023-12-01 | End: 2023-12-01

## 2023-12-01 RX ORDER — LORAZEPAM 1 MG/1
2 TABLET ORAL EVERY 6 HOURS PRN
Status: DISCONTINUED | OUTPATIENT
Start: 2023-12-01 | End: 2023-12-03 | Stop reason: HOSPADM

## 2023-12-01 RX ORDER — LORAZEPAM 1 MG/1
1 TABLET ORAL DAILY
Status: DISCONTINUED | OUTPATIENT
Start: 2023-12-05 | End: 2023-12-01

## 2023-12-01 RX ORDER — LABETALOL HYDROCHLORIDE 5 MG/ML
10 INJECTION, SOLUTION INTRAVENOUS EVERY 6 HOURS PRN
Status: DISCONTINUED | OUTPATIENT
Start: 2023-12-01 | End: 2023-12-02

## 2023-12-01 RX ORDER — LORAZEPAM 1 MG/1
1 TABLET ORAL EVERY 8 HOURS
Status: DISCONTINUED | OUTPATIENT
Start: 2023-12-02 | End: 2023-12-01

## 2023-12-01 RX ADMIN — LEVALBUTEROL HYDROCHLORIDE 1.25 MG: 1.25 SOLUTION RESPIRATORY (INHALATION) at 08:27

## 2023-12-01 RX ADMIN — LORAZEPAM 2 MG: 1 TABLET ORAL at 01:31

## 2023-12-01 RX ADMIN — FLUTICASONE FUROATE AND VILANTEROL TRIFENATATE 1 PUFF: 200; 25 POWDER RESPIRATORY (INHALATION) at 10:47

## 2023-12-01 RX ADMIN — LORAZEPAM 1 MG: 1 TABLET ORAL at 07:22

## 2023-12-01 RX ADMIN — SODIUM CHLORIDE 100 ML/HR: 0.9 INJECTION, SOLUTION INTRAVENOUS at 00:44

## 2023-12-01 RX ADMIN — Medication 1 TABLET: at 10:20

## 2023-12-01 RX ADMIN — APIXABAN 5 MG: 5 TABLET, FILM COATED ORAL at 17:01

## 2023-12-01 RX ADMIN — PANTOPRAZOLE SODIUM 40 MG: 40 TABLET, DELAYED RELEASE ORAL at 05:26

## 2023-12-01 RX ADMIN — NICOTINE 1 PATCH: 21 PATCH, EXTENDED RELEASE TRANSDERMAL at 10:18

## 2023-12-01 RX ADMIN — TAMSULOSIN HYDROCHLORIDE 0.4 MG: 0.4 CAPSULE ORAL at 10:21

## 2023-12-01 RX ADMIN — IPRATROPIUM BROMIDE 0.5 MG: 0.5 SOLUTION RESPIRATORY (INHALATION) at 20:15

## 2023-12-01 RX ADMIN — APIXABAN 5 MG: 5 TABLET, FILM COATED ORAL at 10:20

## 2023-12-01 RX ADMIN — MAGNESIUM SULFATE HEPTAHYDRATE 2 G: 40 INJECTION, SOLUTION INTRAVENOUS at 01:31

## 2023-12-01 RX ADMIN — MAGNESIUM SULFATE HEPTAHYDRATE 2 G: 40 INJECTION, SOLUTION INTRAVENOUS at 06:12

## 2023-12-01 RX ADMIN — LORAZEPAM 2 MG: 1 TABLET ORAL at 22:07

## 2023-12-01 RX ADMIN — RANOLAZINE 500 MG: 500 TABLET, FILM COATED, EXTENDED RELEASE ORAL at 20:25

## 2023-12-01 RX ADMIN — ASPIRIN 81 MG CHEWABLE TABLET 81 MG: 81 TABLET CHEWABLE at 10:21

## 2023-12-01 RX ADMIN — LEVALBUTEROL HYDROCHLORIDE 1.25 MG: 1.25 SOLUTION RESPIRATORY (INHALATION) at 14:20

## 2023-12-01 RX ADMIN — POTASSIUM CHLORIDE 20 MEQ: 1500 TABLET, EXTENDED RELEASE ORAL at 06:12

## 2023-12-01 RX ADMIN — APIXABAN 5 MG: 5 TABLET, FILM COATED ORAL at 00:42

## 2023-12-01 RX ADMIN — IPRATROPIUM BROMIDE 0.5 MG: 0.5 SOLUTION RESPIRATORY (INHALATION) at 14:20

## 2023-12-01 RX ADMIN — IPRATROPIUM BROMIDE AND ALBUTEROL SULFATE 3 ML: 2.5; .5 SOLUTION RESPIRATORY (INHALATION) at 00:17

## 2023-12-01 RX ADMIN — METOPROLOL TARTRATE 25 MG: 25 TABLET, FILM COATED ORAL at 20:25

## 2023-12-01 RX ADMIN — Medication 400 MG: at 10:25

## 2023-12-01 RX ADMIN — LORAZEPAM 2 MG: 1 TABLET ORAL at 17:01

## 2023-12-01 RX ADMIN — Medication 400 MG: at 17:01

## 2023-12-01 RX ADMIN — THIAMINE HCL TAB 100 MG 100 MG: 100 TAB at 10:21

## 2023-12-01 RX ADMIN — PRAVASTATIN SODIUM 40 MG: 40 TABLET ORAL at 15:36

## 2023-12-01 RX ADMIN — RANOLAZINE 500 MG: 500 TABLET, FILM COATED, EXTENDED RELEASE ORAL at 10:20

## 2023-12-01 RX ADMIN — FOLIC ACID 1 MG: 1 TABLET ORAL at 10:20

## 2023-12-01 RX ADMIN — LEVALBUTEROL HYDROCHLORIDE 1.25 MG: 1.25 SOLUTION RESPIRATORY (INHALATION) at 20:15

## 2023-12-01 RX ADMIN — METOPROLOL TARTRATE 25 MG: 25 TABLET, FILM COATED ORAL at 10:21

## 2023-12-01 NOTE — ASSESSMENT & PLAN NOTE
Chronic, rate controlled  Home meds: Eliquis 5 mg BID, Lopressor 25 mg every 12 hrs, Ranexa 500 mg every 12 hrs  EKG on admission showed  NSR, LVH     Plan:  Continue home Eliquis, Ranexa and Lopressor

## 2023-12-01 NOTE — H&P
History and Physical - [facility]  Family Medicine Residency     Patient Information: Pretty Gallagher 66 y.o. male MRN: [id number]  Unit/Bed#: ED 06 Encounter: [id number]  Admitting Physician: Tamiko Suh MD   PCP: Lan Kocher, MD  Date of Admission:  11/30/23     Assessment and Plan     * Alcohol intoxication [facility])  Assessment & Plan  Pt states that he has been binge drinking and wants to detox  Pt refused to transfer to [address] [facility] for alcohol rehab  Pt does not have any symptoms of withdrawal currently    -CIWA protocol  -Will start ativan taper once Pt has withdrawal symptoms  - ml/hr  -fall precaution  -start thiamine, multi-vitamin, folic acid     Acute kidney injury ([address])  Assessment & Plan  Cr 2.72 POA   Baseline 1.1    -avoid nephrotoxic medication  - ml/hr  -monitor creatinine    COPD (chronic obstructive pulmonary disease) ([address])  Assessment & Plan  Patient has SOB at baseline, not on home oxygen. Currently smoking 1 packs/day. Pt has wheezing POA     Continue home inhalers Breo Ellipta  Duo nebs TID  Albuterol neb PRN    Chronic hyponatremia  Assessment & Plan  Chronic in setting of chronic alcohol use  Na 129 POA baseline 129-30   Pt was on sodium tablet 1g QID for 2 weeks at last discharge     -IVF NS 100ml/hr  -consider sodium tablet if sodium worsens with IVF NS     Type 2 diabetes mellitus ([address])  Assessment & Plan  Lab Results   Component Value Date    HGBA1C 6.1 (H) 09/20/2023       No results for input(s): "POCGLU" in the last 72 hours. Blood Sugar Average: Last 72 hrs:    Patient is on home Metformin 500mg daily.  Pt is also on gabapentin 300 mg TID for diabetic  neuropathy     -Pt insists he wants to be on regular diet  -On regular diet  -hold home metformin, will start ISS and accucheck ACHS   -hold home gabapentin for now due to ENMA      GERD (gastroesophageal reflux disease)  Assessment & Plan  Continue home protonix     Prostate cancer [facility])  Assessment & Plan  Hx prostate cancer status postradiation  Continue home Flomax    History of Atrial fibrillation (HCC)  Assessment & Plan  Chronic, rate controlled  Home meds: Eliquis 5 mg BID, Lopressor 25 mg every 12 hrs, Ranexa 500 mg every 12 hrs  EKG on admission showed  NSR, LVH     Plan:  - Continue home Eliquis, Ranexa and Lopressor     Nicotine abuse  Assessment & Plan  Chronic, current smoker   Continue Nicotine patch  Continue home chantix  smoking cessation education    CAD (coronary artery disease)  Assessment & Plan  hx of CAD s/p CABG    -conitinue home ASA, statin, ranolazine      Essential hypertension  Assessment & Plan  Chronic, stable. Home meds: Lisinopril 10 mg QD & Lopressor 25 mg BID. -Continue home Lopressor 25mg BID  -Hold home lisinopril 10mg QD due to ENMA and soft BP         Fluids: IV NS @100mL/hr  Electrolyte repletion: Replete as needed. Nutrition:        Diet Orders   (From admission, onward)                 Start     Ordered    11/30/23 2153  Diet Regular; Regular House  Diet effective now        References:    Adult Nutrition Support Algorithm    RD Therapeutic Diet Order Protocol   Question Answer Comment   Diet Type Regular    Regular Regular House    RD to adjust diet per protocol? Yes        11/30/23 2154                   VTE Prophylaxis: VTE Score: 5 High Risk (Score >/= 5) - Pharmacological DVT Prophylaxis Ordered: apixaban (Eliquis). Sequential Compression Devices Ordered. Code Status: Level 1 - Full Code  Anticipated Length of Stay:  Patient will be admitted on an Inpatient basis with an anticipated length of stay of  more than 2 midnights. Justification for Hospital Stay: ENMA & Alcohol Detox  Total Time for Visit, including Counseling / Coordination of Care: 30 mins. Greater than 50% of this total time spent on direct patient counseling and coordination of care.     Chief Complaint:     Chief Complaint   Patient presents with    Detox Evaluation     Pt reports drinking heavily for 3 weeks and wants detox, last drink was 2 hours ago, vodka. No withdrawal symptoms at time of triage. No SI/HI. Subjective      History of Present Illness:     Virginia Arguello is a 66 y.o. male with PMHx of Afib, CAD, DM2 HTN, COPD, GERD, Nicotine dependence and chronic hyponatremia in setting of chronic EtOH abuse who presents to the ED on his own via taxi for alcohol detoxification. Symptoms on admission include headache & nausea. Patient denies seizures, tremors, chest pain, SOB, loss of consciousness, restlessness, or sweating. Patient reports drinking approx 1qt of Vodka a day for the past two weeks and 1.5pts of whiskey today, reports wanting to make a change since today is his sons birthday. Patient refuses transfer to [facility] for inpatient rehab, states that he only wants detox and "knows what to do once he gets back home". ED labs were significant for acute kidney injury do patient is to be admitted for medical management. ED: Albuterol 5mg nebs, Atrovent 0.5mg nebs     Review of Systems:  Review of Systems   Constitutional:  Negative for chills and fever. HENT:  Negative for ear pain and sore throat. Eyes:  Negative for pain and visual disturbance. Respiratory:  Negative for cough and shortness of breath. Cardiovascular:  Negative for chest pain and palpitations. Gastrointestinal:  Positive for nausea and vomiting. Negative for abdominal pain. Genitourinary:  Negative for dysuria and hematuria. Musculoskeletal:  Negative for arthralgias and back pain. Skin:  Negative for color change and rash. Neurological:  Positive for headaches. Negative for dizziness, tremors, seizures, syncope, weakness and light-headedness. All other systems reviewed and are negative.        Past Medical History:   Diagnosis Date    Acute on chronic kidney failure      Alcohol withdrawal ([address]) 06/07/2019    Atrial fibrillation (HCC)     Cancer (HCC)     prostate ca,had radiation    Cardiac disease     stents,then triple bypass    COPD (chronic obstructive pulmonary disease) (HCC)     ETOH abuse     Hx of heart artery stent     2014    Hyperlipidemia     Hypertension     Hypovolemic shock ([address]) 12/22/2019    Lumbar spondylitis ([address]) 10/13/2022    Nasal bone fracture 10/10/2022    Prostate CA ([address])     S/P CABG x 3     2004    Sleep apnea      Past Surgical History:   Procedure Laterality Date    CARDIAC CATHETERIZATION      2 stents    CORONARY ARTERY BYPASS GRAFT  2004    NE ARTHRD ANT INTERBODY MIN PHOEBE [facility] CRV BELOW C2 N/A 12/16/2020    Procedure: Anterior cervical discectomy with fusion C4-C7; Posterior cervical decompression and fusion C2-T2;  Surgeon: Michele Estevez MD;  Location: [facility];  Service: Neurosurgery    TONSILLECTOMY       No Known Allergies  Prior to Admission Medications   Prescriptions Last Dose Informant Patient Reported? Taking? Blood Glucose Monitoring Suppl (ONE TOUCH ULTRA MINI) w/Device KIT  Self Yes No   Sig: Use as directed   Breo Ellipta 200-25 MCG/ACT inhaler  Self No No   Sig: Inhale 1 puff daily Rinse mouth after use.    ONETOUCH DELICA LANCETS FINE MISC  Self Yes No   Sig: 3 (three) times a day Test   Thiamine HCl (vitamin B-1) 100 MG TABS  Self No No   Sig: TAKE 1 TABLET DAILY   albuterol (Ventolin HFA) 90 mcg/act inhaler  Self No No   Sig: Inhale 2 puffs every 4 (four) hours as needed for wheezing   apixaban (ELIQUIS) 5 mg  Self No No   Sig: Take 1 tablet (5 mg total) by mouth 2 (two) times a day   aspirin (ECOTRIN LOW STRENGTH) 81 mg EC tablet  Self Yes No   Sig: Take 81 mg by mouth daily   ferrous sulfate 325 (65 Fe) mg tablet  Self No No   Sig: Take 1 tablet (325 mg total) by mouth daily with breakfast   folic acid (FOLVITE) 1 mg tablet  Self No No   Sig: TAKE 1 TABLET DAILY   gabapentin (NEURONTIN) 300 mg capsule  Self No No   Sig: TAKE ONE CAPSULE THREE TIMES DAILY   lidocaine (LIDODERM) 5 %   No No   Sig: Apply 1 patch topically over 12 hours daily Remove & Discard patch within 12 hours or as directed by MD Do not start before November 22, 2023. lisinopril (ZESTRIL) 20 mg tablet  Self No No   Sig: Take 0.5 tablets (10 mg total) by mouth daily   magnesium Oxide (MAG-OX) 400 mg TABS  Self No No   Sig: TAKE 1 TABLET TWO TIMES A DAY   metFORMIN (GLUCOPHAGE) 500 mg tablet  Self No No   Sig: Take 1 tablet (500 mg total) by mouth daily with breakfast Do not start before September 23, 2023. metoprolol tartrate (LOPRESSOR) 25 mg tablet  Self No No   Sig: Take 1 tablet (25 mg total) by mouth every 12 (twelve) hours   naltrexone (REVIA) 50 mg tablet  Self No No   Sig: Take 1 tablet (50 mg total) by mouth daily   nicotine (NICODERM CQ) 21 mg/24 hr TD 24 hr patch  Self No No   Sig: Place 1 patch on the skin over 24 hours every 24 hours   pantoprazole (PROTONIX) 40 mg tablet   No No   Sig: Take 1 tablet (40 mg total) by mouth 2 (two) times a day   polyethylene glycol (COLYTE) 4000 mL solution   No No   Sig: Take 4,000 mL by mouth once for 1 dose Take 4000 mL by mouth once for 1 dose.  Use as directed   pravastatin (PRAVACHOL) 40 mg tablet  Self No No   Sig: TAKE 1 TABLET DAILY WITH DINNER   ranolazine (RANEXA) 500 mg 12 hr tablet  Self No No   Sig: TAKE 1 TABLET EVERY 12 HOURS   sodium chloride 1 g tablet   No No   Sig: Take 2 tablets (2 g total) by mouth 4 (four) times a day (with meals and at bedtime)   tamsulosin (FLOMAX) 0.4 mg  Self No No   Sig: TAKE 1 CAPSULE DAILY   varenicline (CHANTIX) 1 mg tablet  Self No No   Sig: TAKE 1 TABLET 2 TIMES A DAY      Facility-Administered Medications: None     Social History     Socioeconomic History    Marital status: Single     Spouse name: Not on file    Number of children: Not on file    Years of education: Not on file    Highest education level: Not on file   Occupational History    Occupation: contractor, 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)   Tobacco Use    Smoking status: Every Day     Packs/day: 1.50 Years: 40.00     Total pack years: 60.00     Types: Cigarettes    Smokeless tobacco: Never   Vaping Use    Vaping Use: Never used   Substance and Sexual Activity    Alcohol use: Not Currently     Alcohol/week: 4.0 standard drinks of alcohol     Types: 4 Standard drinks or equivalent per week    Drug use: No    Sexual activity: Not Currently   Other Topics Concern    Not on file   Social History Narrative    Not on file     Social Determinants of Health     Financial Resource Strain: Low Risk  (11/2/2023)    Overall Financial Resource Strain (CARDIA)     Difficulty of Paying Living Expenses: Not very hard   Food Insecurity: No Food Insecurity (11/2/2023)    Hunger Vital Sign     Worried About Running Out of Food in the Last Year: Never true     Ran Out of Food in the Last Year: Never true   Transportation Needs: No Transportation Needs (11/2/2023)    PRAPARE - Transportation     Lack of Transportation (Medical): No     Lack of Transportation (Non-Medical):  No   Physical Activity: Not on file   Stress: No Stress Concern Present (11/2/2023)    [facility]     Feeling of Stress : Not at all   Social Connections: Unknown (4/15/2021)    Social Connection and Isolation Panel [NHANES]     Frequency of Communication with Friends and Family: More than three times a week     Frequency of Social Gatherings with Friends and Family: Not on file     Attends Christian Services: Not on file     Active Member of Clubs or Organizations: Not on file     Attends Club or Organization Meetings: Not on file     Marital Status: Not on file   Intimate Partner Violence: Not on file   Housing Stability: [address]  (11/2/2023)    Housing Stability Vital Sign     Unable to Pay for Housing in the Last Year: No     Number of Places Lived in the Last Year: 1     Unstable Housing in the Last Year: No     Family History   Problem Relation Age of Onset    Diabetes Mother     Uterine cancer Mother     COPD Father     Hypertension Father         Patient Pre-hospital Living Situation: home  Patient Pre-hospital Level of Mobility: full  Patient Pre-hospital Diet Restrictions: regular          Objective     Physical Exam:   Vitals:   Blood Pressure: 106/57 (11/30/23 2215)  Pulse: 69 (11/30/23 2215)  Temperature: 98.6 °F (37 °C) (11/30/23 1928)  Temp Source: Tympanic (11/30/23 1928)  Respirations: 20 (11/30/23 2215)  SpO2: 99 % (11/30/23 2215)     Physical Exam  HENT:      Head: Normocephalic and atraumatic. Mouth/Throat:      Mouth: Mucous membranes are moist.      Pharynx: Oropharynx is clear. Eyes:      General: No scleral icterus. Extraocular Movements: Extraocular movements intact. Conjunctiva/sclera: Conjunctivae normal.      Pupils: Pupils are equal, round, and reactive to light. Cardiovascular:      Rate and Rhythm: Normal rate and regular rhythm. Pulses: Normal pulses. Heart sounds: Normal heart sounds. Pulmonary:      Effort: Pulmonary effort is normal. No respiratory distress. Breath sounds: Wheezing present. Abdominal:      General: Abdomen is flat. Bowel sounds are normal. There is no distension. Palpations: Abdomen is soft. Tenderness: There is no abdominal tenderness. Musculoskeletal:         General: Normal range of motion. Cervical back: Normal range of motion and neck supple. No tenderness. Right lower leg: No edema. Left lower leg: No edema. Lymphadenopathy:      Cervical: No cervical adenopathy. Skin:     General: Skin is warm and dry. Capillary Refill: Capillary refill takes less than 2 seconds. Findings: Bruising (b/l forearms) present. Neurological:      General: No focal deficit present. Mental Status: He is alert and oriented to person, place, and time. Motor: No weakness.    Psychiatric:         Mood and Affect: Mood normal.          Lab Results: I have personally reviewed pertinent reports. Results from last 7 days   Lab Units 11/30/23 1954   WBC Thousand/uL 6.77   HEMOGLOBIN g/dL 13.3   HEMATOCRIT % 37.6   PLATELETS Thousands/uL 162   NEUTROS PCT % 68   LYMPHS PCT % 23   MONOS PCT % 6   EOS PCT % 1     Results from last 7 days   Lab Units 11/30/23 1954   POTASSIUM mmol/L 4.2   CHLORIDE mmol/L 91*   CO2 mmol/L 21   BUN mg/dL 30*   CREATININE mg/dL 2.72*   CALCIUM mg/dL 8.1*   ALK PHOS U/L 65   ALT U/L 55*   AST U/L 55*   EGFR ml/min/1.73sq m 24     Results from last 7 days   Lab Units 11/30/23 2011   INR  1.03     Results from last 7 days   Lab Units 11/30/23 2011   LACTIC ACID mmol/L 1.9                    Invalid input(s): "URIBILINOGEN"                    Imaging: I have personally reviewed pertinent reports. CT abdomen pelvis wo contrast    Result Date: 11/30/2023  No acute abnormality. Hepatic steatosis. Colonic diverticulosis. Subacute right posterior 10th and 11th rib fractures.  Workstation performed: [id number]         EKG, Pathology, and Other Studies Reviewed on Admission:   EKG  Result Date: 11/30/23  Impression:  NSR           Entire H&P was discussed with Dr. Kala Bassett who agreed to what is noted above     ** Please Note: This note has been constructed using a voice recognition system Kirk Arboleda MD  11/30/23  10:59 PM

## 2023-12-01 NOTE — ED PROVIDER NOTES
History  Chief Complaint   Patient presents with    Detox Evaluation     Pt reports drinking heavily for 3 weeks and wants detox, last drink was 2 hours ago, vodka. No withdrawal symptoms at time of triage. No SI/HI. Pt is a 63yo M who presents for detox evaluation. Patient reports he drinks a quart of vodka daily and states he is here because his "alcohol has gotten out of control". Patient denies any physical complaints. Patient states he drink immediately prior to arrival.  Patient with bourbon bottle present on his person at time of history and physical. (Removed and placed with belongings.)  Patient denies any recent illness. Patient denies any nausea, vomiting, diarrhea. Patient does report that yesterday he noticed blood in his stool. Patient unsure how long this has been happening. Prior to Admission Medications   Prescriptions Last Dose Informant Patient Reported? Taking? Blood Glucose Monitoring Suppl (ONE TOUCH ULTRA MINI) w/Device KIT  Self Yes No   Sig: Use as directed   Breo Ellipta 200-25 MCG/ACT inhaler  Self No No   Sig: Inhale 1 puff daily Rinse mouth after use.    ONETOUCH DELICA LANCETS FINE MISC  Self Yes No   Sig: 3 (three) times a day Test   Thiamine HCl (vitamin B-1) 100 MG TABS  Self No No   Sig: TAKE 1 TABLET DAILY   albuterol (Ventolin HFA) 90 mcg/act inhaler  Self No No   Sig: Inhale 2 puffs every 4 (four) hours as needed for wheezing   apixaban (ELIQUIS) 5 mg  Self No No   Sig: Take 1 tablet (5 mg total) by mouth 2 (two) times a day   aspirin (ECOTRIN LOW STRENGTH) 81 mg EC tablet  Self Yes No   Sig: Take 81 mg by mouth daily   ferrous sulfate 325 (65 Fe) mg tablet  Self No No   Sig: Take 1 tablet (325 mg total) by mouth daily with breakfast   folic acid (FOLVITE) 1 mg tablet  Self No No   Sig: TAKE 1 TABLET DAILY   gabapentin (NEURONTIN) 300 mg capsule  Self No No   Sig: TAKE ONE CAPSULE THREE TIMES DAILY   lidocaine (LIDODERM) 5 %   No No   Sig: Apply 1 patch topically over 12 hours daily Remove & Discard patch within 12 hours or as directed by MD Do not start before November 22, 2023. lisinopril (ZESTRIL) 20 mg tablet  Self No No   Sig: Take 0.5 tablets (10 mg total) by mouth daily   magnesium Oxide (MAG-OX) 400 mg TABS  Self No No   Sig: TAKE 1 TABLET TWO TIMES A DAY   metFORMIN (GLUCOPHAGE) 500 mg tablet  Self No No   Sig: Take 1 tablet (500 mg total) by mouth daily with breakfast Do not start before September 23, 2023. metoprolol tartrate (LOPRESSOR) 25 mg tablet  Self No No   Sig: Take 1 tablet (25 mg total) by mouth every 12 (twelve) hours   naltrexone (REVIA) 50 mg tablet  Self No No   Sig: Take 1 tablet (50 mg total) by mouth daily   nicotine (NICODERM CQ) 21 mg/24 hr TD 24 hr patch  Self No No   Sig: Place 1 patch on the skin over 24 hours every 24 hours   pantoprazole (PROTONIX) 40 mg tablet   No No   Sig: Take 1 tablet (40 mg total) by mouth 2 (two) times a day   polyethylene glycol (COLYTE) 4000 mL solution   No No   Sig: Take 4,000 mL by mouth once for 1 dose Take 4000 mL by mouth once for 1 dose.  Use as directed   pravastatin (PRAVACHOL) 40 mg tablet  Self No No   Sig: TAKE 1 TABLET DAILY WITH DINNER   ranolazine (RANEXA) 500 mg 12 hr tablet  Self No No   Sig: TAKE 1 TABLET EVERY 12 HOURS   sodium chloride 1 g tablet   No No   Sig: Take 2 tablets (2 g total) by mouth 4 (four) times a day (with meals and at bedtime)   tamsulosin (FLOMAX) 0.4 mg  Self No No   Sig: TAKE 1 CAPSULE DAILY   varenicline (CHANTIX) 1 mg tablet  Self No No   Sig: TAKE 1 TABLET 2 TIMES A DAY      Facility-Administered Medications: None       Past Medical History:   Diagnosis Date    Acute on chronic kidney failure      Alcohol withdrawal (HCC) 06/07/2019    Atrial fibrillation (HCC)     Cancer (HCC)     prostate ca,had radiation    Cardiac disease     stents,then triple bypass    COPD (chronic obstructive pulmonary disease) (HCC)     ETOH abuse     Hx of heart artery stent 2014    Hyperlipidemia     Hypertension     Hypovolemic shock ([address]) 12/22/2019    Lumbar spondylitis ([address]) 10/13/2022    Nasal bone fracture 10/10/2022    Prostate CA ([address])     S/P CABG x 3     2004    Sleep apnea        Past Surgical History:   Procedure Laterality Date    CARDIAC CATHETERIZATION      2 stents    CORONARY ARTERY BYPASS GRAFT  2004    HI ARTHRD ANT INTERBODY MIN [address] CRV BELOW C2 N/A 12/16/2020    Procedure: Anterior cervical discectomy with fusion C4-C7; Posterior cervical decompression and fusion C2-T2;  Surgeon: Virgie Rodgers MD;  Location: [facility];  Service: Neurosurgery    TONSILLECTOMY         Family History   Problem Relation Age of Onset    Diabetes Mother     Uterine cancer Mother     COPD Father     Hypertension Father      I have reviewed and agree with the history as documented. E-Cigarette/Vaping    E-Cigarette Use Never User      E-Cigarette/Vaping Substances    Nicotine Yes     THC No     CBD No     Flavoring No     Other No     Unknown No      Social History     Tobacco Use    Smoking status: Every Day     Packs/day: 1.50     Years: 40.00     Total pack years: 60.00     Types: Cigarettes    Smokeless tobacco: Never   Vaping Use    Vaping Use: Never used   Substance Use Topics    Alcohol use: Not Currently     Alcohol/week: 4.0 standard drinks of alcohol     Types: 4 Standard drinks or equivalent per week    Drug use: No       Review of Systems   Gastrointestinal:  Positive for blood in stool. All other systems reviewed and are negative. Physical Exam  Physical Exam  Vitals reviewed. Constitutional:       General: He is not in acute distress. Appearance: He is well-developed. He is not toxic-appearing or diaphoretic. HENT:      Head: Normocephalic and atraumatic. Right Ear: External ear normal.      Left Ear: External ear normal.      Nose: Nose normal.      Mouth/Throat:      Mouth: Mucous membranes are dry. Pharynx: Oropharynx is clear.    Eyes: Extraocular Movements: Extraocular movements intact. Pupils: Pupils are equal, round, and reactive to light. Cardiovascular:      Rate and Rhythm: Normal rate and regular rhythm. Heart sounds: Normal heart sounds. Pulmonary:      Effort: Pulmonary effort is normal. No respiratory distress. Breath sounds: No stridor. Wheezing (Diffuse) present. Abdominal:      General: Bowel sounds are normal. There is no distension. Palpations: Abdomen is soft. There is no mass. Tenderness: There is abdominal tenderness (Diffuse, worse left lower quadrant). There is no guarding or rebound. Genitourinary:     Rectum: Guaiac result negative. Musculoskeletal:         General: Normal range of motion. Cervical back: Neck supple. Skin:     General: Skin is warm and dry. Capillary Refill: Capillary refill takes less than 2 seconds. Neurological:      Mental Status: He is alert and oriented to person, place, and time. Psychiatric:         Speech: Speech is slurred. Behavior: Behavior is cooperative.          Vital Signs  ED Triage Vitals   Temperature Pulse Respirations Blood Pressure SpO2   11/30/23 1928 11/30/23 1928 11/30/23 1928 11/30/23 1928 11/30/23 1928   98.6 °F (37 °C) 79 20 (!) 80/50 91 %      Temp Source Heart Rate Source Patient Position - Orthostatic VS BP Location FiO2 (%)   11/30/23 1928 11/30/23 2015 11/30/23 2215 11/30/23 2215 --   Tympanic Monitor Lying Left arm       Pain Score       11/30/23 2336       2           Vitals:    11/30/23 2000 11/30/23 2015 11/30/23 2215 11/30/23 2333   BP: (!) 75/47 104/57 106/57 118/71   Pulse: 69 73 69 72   Patient Position - Orthostatic VS:   Lying          Visual Acuity      ED Medications  Medications   thiamine tablet 100 mg (has no administration in time range)   folic acid (FOLVITE) tablet 1 mg (has no administration in time range)   multivitamin-minerals (CENTRUM) tablet 1 tablet (has no administration in time range) insulin lispro (HumaLOG) 100 units/mL subcutaneous injection 1-6 Units ( Subcutaneous Not Given 11/30/23 2225)   nicotine (NICODERM CQ) 21 mg/24 hr TD 24 hr patch 1 patch (has no administration in time range)   acetaminophen (TYLENOL) tablet 650 mg (has no administration in time range)   sodium chloride 0.9 % infusion (has no administration in time range)   apixaban (ELIQUIS) tablet 5 mg (has no administration in time range)   fluticasone-vilanterol 200-25 mcg/actuation 1 puff (has no administration in time range)   pantoprazole (PROTONIX) EC tablet 40 mg (has no administration in time range)   pravastatin (PRAVACHOL) tablet 40 mg (has no administration in time range)   ranolazine (RANEXA) 12 hr tablet 500 mg (has no administration in time range)   tamsulosin (FLOMAX) capsule 0.4 mg (has no administration in time range)   metoprolol tartrate (LOPRESSOR) tablet 25 mg (has no administration in time range)   aspirin chewable tablet 81 mg (has no administration in time range)   varenicline (CHANTIX) tablet 1 mg (has no administration in time range)   ondansetron (ZOFRAN) injection 4 mg (has no administration in time range)   albuterol inhalation solution 2.5 mg (has no administration in time range)   ipratropium-albuterol (DUO-NEB) 0.5-2.5 mg/3 mL inhalation solution 3 mL (has no administration in time range)   sodium chloride 0.9 % bolus 1,000 mL (0 mL Intravenous Stopped 11/30/23 2020)   albuterol inhalation solution 5 mg (5 mg Nebulization Given 11/30/23 2052)   ipratropium (ATROVENT) 0.02 % inhalation solution 0.5 mg (0.5 mg Nebulization Given 11/30/23 2052)       Diagnostic Studies  Results Reviewed       Procedure Component Value Units Date/Time    Magnesium [[id number]]     Lab Status: No result Specimen: Blood     FLU/RSV/COVID - if FLU/RSV clinically relevant [[id number]]  (Normal) Collected: 11/30/23 2011    Lab Status: Final result Specimen: Nares from Nose Updated: 11/30/23 2123     SARS-CoV-2 Negative INFLUENZA A PCR Negative     INFLUENZA B PCR Negative     RSV PCR Negative    Narrative:      FOR PEDIATRIC PATIENTS - copy/paste COVID Guidelines URL to browser: [email or web address]    SARS-CoV-2 assay is a Nucleic Acid Amplification assay intended for the  qualitative detection of nucleic acid from SARS-CoV-2 in nasopharyngeal  swabs. Results are for the presumptive identification of SARS-CoV-2 RNA. Positive results are indicative of infection with SARS-CoV-2, the virus  causing COVID-19, but do not rule out bacterial infection or co-infection  with other viruses. Laboratories within the [facility] and its  territories are required to report all positive results to the appropriate  public health authorities. Negative results do not preclude SARS-CoV-2  infection and should not be used as the sole basis for treatment or other  patient management decisions. Negative results must be combined with  clinical observations, patient history, and epidemiological information. This test has not been FDA cleared or approved. This test has been authorized by FDA under an Emergency Use Authorization  (EUA). This test is only authorized for the duration of time the  declaration that circumstances exist justifying the authorization of the  emergency use of an in vitro diagnostic tests for detection of SARS-CoV-2  virus and/or diagnosis of COVID-19 infection under section 564(b)(1) of  the Act, 21 U. S.C. 155VLA-5(Y)(0), unless the authorization is terminated  or revoked sooner. The test has been validated but independent review by FDA  and CLIA is pending. Test performed using [employer] GeneXpert: This RT-PCR assay targets N2,  a region unique to SARS-CoV-2. A conserved region in the E-gene was chosen  for pan-Sarbecovirus detection which includes SARS-CoV-2. According to CMS-2020-01-R, this platform meets the definition of high-throughput technology.     Lactic acid, plasma (w/reflex if result > 2.0) [[id number]]  (Normal) Collected: 11/30/23 2011    Lab Status: Final result Specimen: Blood from Arm, Right Updated: 11/30/23 2036     LACTIC ACID 1.9 mmol/L     Narrative:      Result may be elevated if tourniquet was used during collection.     Protime-INR [[id number]]  (Normal) Collected: 11/30/23 2011    Lab Status: Final result Specimen: Blood from Arm, Right Updated: 11/30/23 2032     Protime 13.7 seconds      INR 1.03    APTT [[id number]]  (Normal) Collected: 11/30/23 2011    Lab Status: Final result Specimen: Blood from Arm, Right Updated: 11/30/23 2032     PTT 31 seconds     Comprehensive metabolic panel [[id number]]  (Abnormal) Collected: 11/30/23 1954    Lab Status: Final result Specimen: Blood from Arm, Right Updated: 11/30/23 2015     Sodium 129 mmol/L      Potassium 4.2 mmol/L      Chloride 91 mmol/L      CO2 21 mmol/L      ANION GAP 17 mmol/L      BUN 30 mg/dL      Creatinine 2.72 mg/dL      Glucose 60 mg/dL      Calcium 8.1 mg/dL      AST 55 U/L      ALT 55 U/L      Alkaline Phosphatase 65 U/L      Total Protein 7.0 g/dL      Albumin 3.8 g/dL      Total Bilirubin 0.79 mg/dL      eGFR 24 ml/min/1.73sq m     Narrative:      [facility] guidelines for Chronic Kidney Disease (CKD):     Stage 1 with normal or high GFR (GFR > 90 mL/min/1.73 square meters)    Stage 2 Mild CKD (GFR = 60-89 mL/min/1.73 square meters)    Stage 3A Moderate CKD (GFR = 45-59 mL/min/1.73 square meters)    Stage 3B Moderate CKD (GFR = 30-44 mL/min/1.73 square meters)    Stage 4 Severe CKD (GFR = 15-29 mL/min/1.73 square meters)    Stage 5 End Stage CKD (GFR <15 mL/min/1.73 square meters)  Note: GFR calculation is accurate only with a steady state creatinine    Lipase [[id number]]  (Normal) Collected: 11/30/23 1954    Lab Status: Final result Specimen: Blood from Arm, Right Updated: 11/30/23 2015     Lipase 35 u/L     Ethanol [[id number]]  (Abnormal) Collected: 11/30/23 1954 Lab Status: Final result Specimen: Blood from Arm, Right Updated: 11/30/23 2015     Ethanol Lvl 419 mg/dL     CBC and differential [[id number]]  (Abnormal) Collected: 11/30/23 1954    Lab Status: Final result Specimen: Blood from Arm, Right Updated: 11/30/23 1959     WBC 6.77 Thousand/uL      RBC 3.86 Million/uL      Hemoglobin 13.3 g/dL      Hematocrit 37.6 %      MCV 97 fL      MCH 34.5 pg      MCHC 35.4 g/dL      RDW 14.5 %      MPV 10.0 fL      Platelets 225 Thousands/uL      nRBC 0 /100 WBCs      Neutrophils Relative 68 %      Immat GRANS % 1 %      Lymphocytes Relative 23 %      Monocytes Relative 6 %      Eosinophils Relative 1 %      Basophils Relative 1 %      Neutrophils Absolute 4.59 Thousands/µL      Immature Grans Absolute 0.04 Thousand/uL      Lymphocytes Absolute 1.58 Thousands/µL      Monocytes Absolute 0.41 Thousand/µL      Eosinophils Absolute 0.06 Thousand/µL      Basophils Absolute 0.09 Thousands/µL     UA (URINE) with reflex to Scope [[id number]]     Lab Status: No result Specimen: Urine                    CT abdomen pelvis wo contrast   Final Result by Lakshmi Millan DO (11/30 2122)      No acute abnormality. Hepatic steatosis. Colonic diverticulosis. Subacute right posterior 10th and 11th rib fractures.             Workstation performed: [id number]                    Procedures  CriticalCare Time    Date/Time: 11/30/2023 11:36 PM    Performed by: Talib Harkins MD  Authorized by: Talib Harkins MD    Critical care provider statement:     Critical care time (minutes):  33    Critical care was necessary to treat or prevent imminent or life-threatening deterioration of the following conditions:  Circulatory failure and shock    Critical care was time spent personally by me on the following activities:  Obtaining history from patient or surrogate, development of treatment plan with patient or surrogate, evaluation of patient's response to treatment, re-evaluation of patient's condition, ordering and review of laboratory studies and ordering and review of radiographic studies           ED Course  ED Course as of 11/30/23 2338   Thu Nov 30, 2023 1959 Blood Pressure(!): 80/50  Fluids started. 2000 Hemoccult negative. 2001 WBC: 6.77  WNL   2001 CBC and differential(!)  Reviewed and without actionable derangement. 2001 SpO2: 91 %  90-91% on RA. Obvious wheezes on exam with history of COPD. Pt not reporting shortness of breath but will give breathing treatment. 2018 Sodium(!): 129  Hyponatremia stable from most recent prior. 2018 Creatinine(!): 2.72  ENMA present. Fluids in process. 2018 MEDICAL ALCOHOL(!): 419  Elevated. 2019 Lipase: 35  WNL   2020 Blood Pressure: 104/57  Interval improvement with fluids. 2020 eGFR: 24  Will switch CT to non-con. 2033 PTT: 31  WNL   2033 POCT INR: 1.03  WNL   2037 LACTIC ACID: 1.9  WNL   2125 FLU/RSV/COVID - if FLU/RSV clinically relevant  Negative. 2126 CT abdomen pelvis wo contrast  No acute abnormality. Hepatic steatosis. Colonic diverticulosis. Subacute right posterior 10th and 11th rib fractures. 2134 Pt made aware of all results. Pt declined transfer to [facility] but is agreeable to admission here. 2136 FP contacted via TT for admission. SBIRT 22yo+      Flowsheet Row Most Recent Value   Initial Alcohol Screen: US AUDIT-C     1. How often do you have a drink containing alcohol? 0 Filed at: 11/30/2023 1933   Audit-C Score 0 Filed at: 11/30/2023 1933   BRIGIDA: How many times in the past year have you. .. Used an illegal drug or used a prescription medication for non-medical reasons? Never Filed at: 11/30/2023 1933                      Medical Decision Making  Pt is a 65yo M who presents for alcohol detox. Differential diagnosis to include but not limited to infection, dehydration, electrolyte abnormality, arrhythmia, GI bleed, intoxication.   Patient found to be hypotensive upon arrival. Fluids initiated with improvement of blood pressure. Will obtain labs and inevitably plan for admission. Will also obtain CT scan due to abdominal tenderness. With wheezing and borderline low saturation, will also give breathing treatment. See ED course for results and details. Plan to admit pt to [facility]. Pt discussed with admitting team and admission orders placed. Pt admitted without incident. Amount and/or Complexity of Data Reviewed  Labs: ordered. Decision-making details documented in ED Course. Radiology: ordered. Decision-making details documented in ED Course. Risk  Prescription drug management. Decision regarding hospitalization. Disposition  Final diagnoses:   ENMA (acute kidney injury) ([address])   Hypotension   Alcohol abuse     Time reflects when diagnosis was documented in both MDM as applicable and the Disposition within this note       Time User Action Codes Description Comment    11/30/2023  9:40 PM Bob Katia Add [N17.9] ENMA (acute kidney injury) ([address])     11/30/2023  9:40 PM Norvel Evens [I95.9] Hypotension     11/30/2023  9:40 PM Bob Katia Add [F10.10] Alcohol abuse     11/30/2023 10:03 PM Sravanthi Taveras Add [Y15.049] Alcoholic intoxication with complication [facility])           ED Disposition       ED Disposition   Admit    Condition   Stable    Date/Time   Thu Nov 30, 2023 2140    Comment   Case was discussed with FP and the patient's admission status was agreed to be Admission Status: inpatient status to the service of Dr. Robert Woods. Follow-up Information    None         Current Discharge Medication List        CONTINUE these medications which have NOT CHANGED    Details   albuterol (Ventolin HFA) 90 mcg/act inhaler Inhale 2 puffs every 4 (four) hours as needed for wheezing  Qty: 18 g, Refills: 0    Comments: Substitution to a formulary equivalent within the same pharmaceutical class is authorized.   Associated Diagnoses: COPD (chronic obstructive pulmonary disease) ([facility])      apixaban (ELIQUIS) 5 mg Take 1 tablet (5 mg total) by mouth 2 (two) times a day  Qty: 60 tablet, Refills: 5    Associated Diagnoses: Paroxysmal atrial fibrillation ([facility])      aspirin (ECOTRIN LOW STRENGTH) 81 mg EC tablet Take 81 mg by mouth daily      Blood Glucose Monitoring Suppl (ONE TOUCH ULTRA MINI) w/Device KIT Use as directed  Refills: 0      Breo Ellipta 200-25 MCG/ACT inhaler Inhale 1 puff daily Rinse mouth after use. Qty: 60 each, Refills: 3    Associated Diagnoses: COPD (chronic obstructive pulmonary disease) ([facility])      ferrous sulfate 325 (65 Fe) mg tablet Take 1 tablet (325 mg total) by mouth daily with breakfast  Qty: 60 tablet, Refills: 5    Associated Diagnoses: Alcohol abuse      folic acid (FOLVITE) 1 mg tablet TAKE 1 TABLET DAILY  Qty: 90 tablet, Refills: 2    Associated Diagnoses: Alcohol abuse      gabapentin (NEURONTIN) 300 mg capsule TAKE ONE CAPSULE THREE TIMES DAILY  Qty: 90 capsule, Refills: 1    Associated Diagnoses: Edema, spinal cord ([facility])      lidocaine (LIDODERM) 5 % Apply 1 patch topically over 12 hours daily Remove & Discard patch within 12 hours or as directed by MD Do not start before November 22, 2023. Qty: 30 patch, Refills: 0    Associated Diagnoses: Rib fracture      lisinopril (ZESTRIL) 20 mg tablet Take 0.5 tablets (10 mg total) by mouth daily  Qty: 60 tablet, Refills: 0    Associated Diagnoses: Hypertension; Hx of CABG      magnesium Oxide (MAG-OX) 400 mg TABS TAKE 1 TABLET TWO TIMES A DAY  Qty: 60 tablet, Refills: 2    Associated Diagnoses: Alcohol abuse      metFORMIN (GLUCOPHAGE) 500 mg tablet Take 1 tablet (500 mg total) by mouth daily with breakfast Do not start before September 23, 2023.   Refills: 0    Associated Diagnoses: Type 2 diabetes mellitus with hyperosmolarity without coma, without long-term current use of insulin ([facility])      metoprolol tartrate (LOPRESSOR) 25 mg tablet Take 1 tablet (25 mg total) by mouth every 12 (twelve) hours  Qty: 60 tablet, Refills: 0    Associated Diagnoses: Paroxysmal atrial fibrillation (H[facility])      naltrexone (REVIA) 50 mg tablet Take 1 tablet (50 mg total) by mouth daily  Qty: 30 tablet, Refills: 2    Associated Diagnoses: Alcohol intoxication ([address]); Alcohol abuse      nicotine (NICODERM CQ) 21 mg/24 hr TD 24 hr patch Place 1 patch on the skin over 24 hours every 24 hours  Qty: 28 patch, Refills: 0    Associated Diagnoses: Encounter for smoking cessation counseling      Rosalee Melecio LANCETS FINE MISC 3 (three) times a day Test  Refills: 0      pantoprazole (PROTONIX) 40 mg tablet Take 1 tablet (40 mg total) by mouth 2 (two) times a day  Qty: 60 tablet, Refills: 1    Associated Diagnoses: Gastroesophageal reflux disease, unspecified whether esophagitis present      polyethylene glycol (COLYTE) 4000 mL solution Take 4,000 mL by mouth once for 1 dose Take 4000 mL by mouth once for 1 dose.  Use as directed  Qty: 4000 mL, Refills: 0    Associated Diagnoses: Colon cancer screening      pravastatin (PRAVACHOL) 40 mg tablet TAKE 1 TABLET DAILY WITH DINNER  Qty: 90 tablet, Refills: 2    Associated Diagnoses: Essential (primary) hypertension      ranolazine (RANEXA) 500 mg 12 hr tablet TAKE 1 TABLET EVERY 12 HOURS  Qty: 60 tablet, Refills: 3    Associated Diagnoses: Essential (primary) hypertension      sodium chloride 1 g tablet Take 2 tablets (2 g total) by mouth 4 (four) times a day (with meals and at bedtime)  Qty: 60 tablet, Refills: 0    Associated Diagnoses: Hyponatremia      tamsulosin (FLOMAX) 0.4 mg TAKE 1 CAPSULE DAILY  Qty: 90 capsule, Refills: 1    Associated Diagnoses: Hx of prostatic malignancy      Thiamine HCl (vitamin B-1) 100 MG TABS TAKE 1 TABLET DAILY  Qty: 90 tablet, Refills: 0    Associated Diagnoses: Alcohol abuse      varenicline (CHANTIX) 1 mg tablet TAKE 1 TABLET 2 TIMES A DAY  Qty: 56 tablet, Refills: 1    Associated Diagnoses: Nicotine abuse             No discharge procedures on file.    PDMP Review         Value Time User    PDMP Reviewed  Yes 7/26/2021  4:50 PM Marilin Bradshaw.  MD Eduardo            ED Provider  Electronically Signed by             Lesley Moreno MD  11/30/23 [id number]

## 2023-12-01 NOTE — ASSESSMENT & PLAN NOTE
Patient with hx of CAD s/p CABG on Eliquis 5 mg BID at home. Inpatient labs showed thrombocytopenia in the setting of heavy alcohol abuse. Platelet count continues to be low, with stable hemoglobin and no recurrence of bleeding or melena.   Continue with Eliquis  Continue home ASA, statin, ranolazine

## 2023-12-01 NOTE — PROGRESS NOTES
Daily Progress Note - [facility]  Family Medicine Residency  Pippa Hanson 66 y.o. male MRN: [id number]  Unit/Bed#: [address] [place] [address] Encounter: [id number]  Admitting Physician: Patsy Snell MD   PCP: Ramon Gunn MD  Date of Admission:  11/30/2023  7:35 PM    Assessment and Plan    * Alcohol intoxication [facility])  Assessment & Plan  Pt states that he has been binge drinking and wants to detox  Pt refused to transfer to [facility] for alcohol rehab  Pt did not have any symptoms of withdrawal POA  Ethanol: 419    -CIWA protocol  -Last CIWA 14 s/p PO ativan 2 mg  -Pt received a total of 3 mg of Ativan, currently discontinued  -Will monitor withdrawal symptoms before starting Ativan   - ml/hr  -fall precaution  -start thiamine, multi-vitamin, folic acid   -Restart home MAG-OX 400mg BID    Acute kidney injury ([address])  Assessment & Plan  Cr 2.45 POA   Baseline 1.1    -avoid nephrotoxic medication  - ml/hr  -monitor creatinine    COPD (chronic obstructive pulmonary disease) ([address])  Assessment & Plan  Patient has SOB at baseline, not on home oxygen. Currently smoking 1 packs/day.      Continue home inhalers Breo Ellipta  Duo nebs discontinued  Albuterol neb PRN  Ipratropium 0.02%/Atrovent 1.25mg Q6 [address]    GERD (gastroesophageal reflux disease)  Assessment & Plan  Continue home protonix     Prostate cancer [facility])  Assessment & Plan  Hx prostate cancer status postradiation  Continue home Flomax    History of Atrial fibrillation (HCC)  Assessment & Plan  Chronic, rate controlled  Home meds: Eliquis 5 mg BID, Lopressor 25 mg every 12 hrs, Ranexa 500 mg every 12 hrs  EKG on admission showed  NSR, LVH     Plan:  - Continue home Eliquis, Ranexa and Lopressor     Chronic hyponatremia  Assessment & Plan  Chronic in setting of chronic alcohol use  Na 129 POA baseline 129-30   Pt was on sodium tablet 1g QID for 2 weeks at last discharge     -IVF NS 100ml/hr  -consider sodium tablet if sodium worsens with IVF NS     Nicotine abuse  Assessment & Plan  Chronic, current smoker   Continue Nicotine patch  Continue home chantix  smoking cessation education    CAD (coronary artery disease)  Assessment & Plan  hx of CAD s/p CABG    -conitinue home ASA, statin, ranolazine      Essential hypertension  Assessment & Plan  Chronic, stable. Home meds: Lisinopril 10 mg QD & Lopressor 25 mg BID. -Continue home Lopressor 25mg BID  -Hold home lisinopril 10mg QD due to ENMA and soft BP    Type 2 diabetes mellitus ([address])  Assessment & Plan  Lab Results   Component Value Date    HGBA1C 6.1 (H) 09/20/2023       No results for input(s): "POCGLU" in the last 72 hours. Blood Sugar Average: Last 72 hrs:    Patient is on home Metformin 500mg daily. Pt is also on gabapentin 300 mg TID for diabetic  neuropathy     -Pt insists he wants to be on regular diet  -On regular diet  -hold home metformin, will start ISS and accucheck ACHS   -hold home gabapentin for now due to ENMA  -Accu-Glucose check Q6        VTE Pharmacologic Prophylaxis: VTE Score: 5 High Risk (Score >/= 5) - Pharmacological DVT Prophylaxis Ordered: apixaban (Eliquis). Sequential Compression Devices Ordered. Patient Centered Rounds: I have performed bedside rounds with nursing staff today. Discussions with Specialists or Other Care Team Provider: PT/OT    Education and Discussions with Family / Patient:   Patient Information Sharing: With the consent of Domonique Alonzo , their loved ones were notified today by inpatient team of the patient’s condition and current plan. All questions answered. Time Spent for Care: 30 minutes. More than 50% of total time spent on counseling and coordination of care as described above.     Current Length of Stay: 1 day(s)    Current Patient Status: Inpatient   Certification Statement: The patient will continue to require additional inpatient hospital stay due to Alcohol withdrawal symptoms     Discharge Plan: Home    Code Status: Level 1 - Full Code    Subjective:   Pt seen and examined at bedside. Pt was drowsy and kept on falling asleep while I was trying to speak with him. Pt states that his last BM was couple days ago and his last meal was a sandwich on . He has 1.5 pint of whiskey yesterday. Pt continues to have tremors. Denies headache, chest pain, shortness of breathe and pain. Objective     Objective:   Vitals:   Temp (24hrs), Av.1 °F (36.7 °C), Min:97.7 °F (36.5 °C), Max:98.6 °F (37 °C)    Temp:  [97.7 °F (36.5 °C)-98.6 °F (37 °C)] 98 °F (36.7 °C)  HR:  [68-79] 72  Resp:  [16-20] 16  BP: ()/(47-71) 124/61  SpO2:  [91 %-99 %] 93 %  Body mass index is 25.78 kg/m². Input and Output Summary (last 24 hours): Intake/Output Summary (Last 24 hours) at 2023 0915  Last data filed at 2023  Gross per 24 hour   Intake 1000 ml   Output --   Net 1000 ml       Physical Exam:   Physical Exam  Cardiovascular:      Rate and Rhythm: Normal rate and regular rhythm. Pulses: Normal pulses. Heart sounds: No murmur heard. Pulmonary:      Effort: Pulmonary effort is normal. No respiratory distress. Breath sounds: Normal breath sounds. No stridor. Abdominal:      General: Abdomen is flat. Bowel sounds are normal. There is no distension. Palpations: Abdomen is soft. Musculoskeletal:         General: Normal range of motion. Cervical back: Normal range of motion. Skin:     General: Skin is warm. Capillary Refill: Capillary refill takes less than 2 seconds. Neurological:      Mental Status: He is alert. Mental status is at baseline.       Comments: drowsy           Additional Data:     Labs:  Results from last 7 days   Lab Units 23  0424   WBC Thousand/uL 5.83   HEMOGLOBIN g/dL 11.8*   HEMATOCRIT % 34.1*   PLATELETS Thousands/uL 136*   NEUTROS PCT % 67   LYMPHS PCT % 18   MONOS PCT % 12   EOS PCT % 2     Results from last 7 days   Lab Units 23  0424   POTASSIUM mmol/L 3. 8   CHLORIDE mmol/L 95*   CO2 mmol/L 22   BUN mg/dL 33*   CREATININE mg/dL 2.45*   CALCIUM mg/dL 7.8*   ALK PHOS U/L 56   ALT U/L 44   AST U/L 42*     Results from last 7 days   Lab Units 11/30/23 2011   INR  1.03     Results from last 7 days   Lab Units 12/01/23  0708   POC GLUCOSE mg/dl 89           * I Have Reviewed All Lab Data Listed Above. * Additional Pertinent Lab Tests Reviewed:  [address] Admission Reviewed    Imaging:    Imaging Reports Reviewed Today Include: yes  Imaging Personally Reviewed by Myself Includes:  yes    Recent Cultures (last 7 days):           Last 24 Hours Medication List:   Current Facility-Administered Medications   Medication Dose Route Frequency Provider Last Rate    acetaminophen  650 mg Oral Q6H PRN Isabel Johnston MD      albuterol  2.5 mg Nebulization Q6H PRN Isabel Johnston MD      apixaban  5 mg Oral BID Franklin Fuller MD      aspirin  81 mg Oral Daily Franklin Fuller MD      fluticasone-vilanterol  1 puff Inhalation Daily Franklin Fuller MD      folic acid  1 mg Oral Daily Franklin Fuller MD      insulin lispro  1-6 Units Subcutaneous 4x Daily (AC & HS) Sherita Cartagena MD      levalbuterol  1.25 mg Nebulization Q6H [facility] Sherita Cartagena MD      And    ipratropium  0.5 mg Nebulization Q6H [address] Pepe Ortiz MD      magnesium Oxide  400 mg Oral BID Sherita Cartagena MD      metoprolol tartrate  25 mg Oral Q12H [address] Eri Galvez MD      multivitamin-minerals  1 tablet Oral Daily Franklin Fuller MD      nicotine  1 patch Transdermal Daily Isabel Johnston MD      ondansetron  4 mg Intravenous Q6H PRN Franklin Fuller MD      pantoprazole  40 mg Oral Early Morning Franklin Fuller MD      pravastatin  40 mg Oral Daily With Mehul Nieves MD      ranolazine  500 mg Oral Q12H [address] Eri Galvez MD      sodium chloride  125 mL/hr Intravenous Continuous Isabel Johnston  mL/hr (12/01/23 0354)    tamsulosin  0.4 mg Oral Daily Franklin Fuller MD      Thiamine Mononitrate  100 mg Oral Daily Kirk Arboleda MD      varenicline  1 mg Oral BID Kirk Arboleda MD               ** Please Note: Dictation voice to text software may have been used in the creation of this document.  Connor Washington MD  12/01/23  9:15 AM

## 2023-12-01 NOTE — QUICK NOTE
Patient on alcohol detox last CIWA 14, currently on Ativan 1mg taper began complaining of dull non radiating pressure-like left sided chest pain. No change in pain when sitting forward or laying back. Reports mild headache. Denies SOB, nausea, vomiting, dizziness, weakness, blurred vision.  - EKG: NSR with non-specific ST changes. Physical Exam  Eyes:      Pupils: Pupils are equal, round, and reactive to light. Cardiovascular:      Rate and Rhythm: Normal rate and regular rhythm. Pulses: Normal pulses. Heart sounds: Normal heart sounds. Pulmonary:      Effort: Pulmonary effort is normal.      Breath sounds: No wheezing, rhonchi or rales. Neurological:      General: No focal deficit present. Mental Status: He is alert and oriented to person, place, and time. Motor: Tremor (mild) present.      Chest XR  Trop 0hr

## 2023-12-01 NOTE — ASSESSMENT & PLAN NOTE
Cr 1.46 POA. Baseline 1.1. Patient started on IV fluids 125 ml/hour which was then discontinued.   Lisinopril held in the setting of ENMA and soft BP    Plan  Restart lisinopril 10 mg daily  Repeat BMP in 1 week after discharge

## 2023-12-01 NOTE — ASSESSMENT & PLAN NOTE
Chronic, current smoker   Continue Nicotine patch  Continue home chantix  smoking cessation education

## 2023-12-01 NOTE — ASSESSMENT & PLAN NOTE
Patient has SOB at baseline, not on home oxygen. Currently smoking 1 packs/day.   Pt was on Xopenex nebulizer and Atrovent during hospitalization    Plan  Continue home inhalers Breo Ellipta  Continue with nicotine patch   Continue chantix

## 2023-12-01 NOTE — UTILIZATION REVIEW
Initial Clinical Review    Admission: Date/Time/Statement:   Admission Orders (From admission, onward)       Ordered        11/30/23 2150  INPATIENT ADMISSION  Once                          Orders Placed This Encounter   Procedures    INPATIENT ADMISSION     Standing Status:   Standing     Number of Occurrences:   1     Order Specific Question:   Level of Care     Answer:   Med Surg [16]     Order Specific Question:   Estimated length of stay     Answer:   More than 2 Midnights     Order Specific Question:   Certification     Answer:   I certify that inpatient services are medically necessary for this patient for a duration of greater than two midnights. See H&P and MD Progress Notes for additional information about the patient's course of treatment. ED Arrival Information       Expected   -    Arrival   11/30/2023 19:23    Acuity   Emergent              Means of arrival   Wheelchair    Escorted by   Self    Service   Hospitalist    Admission type   Emergency              Arrival complaint   Alcohol Detox Requested             Chief Complaint   Patient presents with    Detox Evaluation     Pt reports drinking heavily for 3 weeks and wants detox, last drink was 2 hours ago, vodka. No withdrawal symptoms at time of triage. No SI/HI. Initial Presentation: 66 y.o. male presents to ed from home for evaluation and treatment of alcohol withdrawal. Currently describes headache and nausea. He reports binge drinking vodka 1 quart daily x 2 weeks and 1.5 pints of whiskey today. .  Refused transfer to [facility] alcohol rehab. Last drink 2 hours go. Clinical assessment significant for  wheezing, shortness of breath, hypotension 75/47. PMHX: HTN,AFIB, PROSTATE CA, DM, COPD w/o home O2. ETHANOL 419, BUN 30,CR 2.72 ( baseline 1.1),  . Imaging shows subacute right posterior rib fractures  10 and 11. Initially treated with iv .9% ns bolus 1L ,albuterol x1, atrovent x1.  Admit to inpatient med surg for alcohol intoxication     Date: 12-1-23    Day 2: inpatient med surg     CIWA 14 > 5  received po ativan at 7077,0164, iv Mag at 0131, 0612. Start po folic acid and po thiamine. Sodium improved to 131. Continue iv .9% ns 125/hr. Nebulization tid. ED Triage Vitals   11/30/23 1928 11/30/23 1928 11/30/23 1928 11/30/23 1928 11/30/23 1928   98.6 °F (37 °C) 79 20 (!) 80/50 91 %      Tympanic Monitor         2          11/30/23 91.1 kg (200 lb 12.8 oz)     Additional Vital Signs:     IWA-Ar Score    Row Name 12/01/23 07:25:44 12/01/23 0345 12/01/23 0100   CIWA-Ar   /61  -DI 91/50  -/57  -IE   Pulse 72  -DI 73  -MS 77  -IE   Nausea and Vomiting 1  -AL 1  -MS 3  -IE   Tactile Disturbances 0  -AL 0  -MS 1  -IE   Tremor 3  -AL 3  -MS 4  -IE   Auditory Disturbances 0  -AL 0  -MS 0  -IE   Paroxysmal Sweats 0  -AL 0  -MS 0  -IE   Visual Disturbances 0  -AL 0  -MS 0  -IE   Anxiety 0  -AL 2  -MS 4  -IE   Headache, Fullness in Head 1  -AL 1  -MS 2  -IE   Agitation 0  -AL 0  -MS 0  -IE   Orientation and Clouding of Sensorium 0  -AL 0  -MS 0  -IE   CIWA-Ar Total 5  -AL 7  -MS 14  -IE             Pertinent Labs/Diagnostic Test Results:   XR chest portable   Final (12/01 1040)      Bibasilar linear atelectasis without pneumonia or pulmonary edema. CT abdomen pelvis wo contrast   Final (11/30 2122)      No acute abnormality. Hepatic steatosis. Colonic diverticulosis. Subacute right posterior 10th and 11th rib fractures.         Results from last 7 days   Lab Units 11/30/23 2011   SARS-COV-2  Negative     Results from last 7 days   Lab Units 12/01/23 0424 11/30/23 1954   WBC Thousand/uL 5.83 6.77   HEMOGLOBIN g/dL 11.8* 13.3   HEMATOCRIT % 34.1* 37.6   PLATELETS Thousands/uL 136* 162   NEUTROS ABS Thousands/µL 3.89 4.59         Results from last 7 days   Lab Units 12/01/23  0424 11/30/23 1954   SODIUM mmol/L 131* 129*   POTASSIUM mmol/L 3.8 4.2   CHLORIDE mmol/L 95* 91*   CO2 mmol/L 22 21   ANION GAP mmol/L 14 17   BUN mg/dL 33* 30*   CREATININE mg/dL 2.45* 2.72*   EGFR ml/min/1.73sq m 27 24   CALCIUM mg/dL 7.8* 8.1*   MAGNESIUM mg/dL 1.8* 1.7*   PHOSPHORUS mg/dL 4.7*  --      Results from last 7 days   Lab Units 12/01/23 0424 11/30/23 1954   AST U/L 42* 55*   ALT U/L 44 55*   ALK PHOS U/L 56 65   TOTAL PROTEIN g/dL 6.2* 7.0   ALBUMIN g/dL 3.5 3.8   TOTAL BILIRUBIN mg/dL 0.62 0.79     Results from last 7 days   Lab Units 12/01/23  1042 12/01/23  0708   POC GLUCOSE mg/dl 136 89     Results from last 7 days   Lab Units 12/01/23 0424 11/30/23 1954   GLUCOSE RANDOM mg/dL 110 60*       Results from last 7 days   Lab Units 12/01/23  0825 12/01/23  0607 12/01/23  0412   HS TNI 0HR ng/L  --   --  33   HS TNI 2HR ng/L  --  32  --    HSTNI D2 ng/L  --  -1  --    HS TNI 4HR ng/L 31  --   --    HSTNI D4 ng/L -2  --   --          Results from last 7 days   Lab Units 11/30/23 2011   PROTIME seconds 13.7   INR  1.03   PTT seconds 31             Results from last 7 days   Lab Units 11/30/23 2011   LACTIC ACID mmol/L 1.9       Results from last 7 days   Lab Units 11/30/23 1954   LIPASE u/L 35       Results from last 7 days   Lab Units 12/01/23  1046   CLARITY UA  Clear   COLOR UA  Yellow   SPEC GRAV UA  1.025   PH UA  5.0   GLUCOSE UA mg/dl Negative   KETONES UA mg/dl Negative   BLOOD UA  Negative   PROTEIN UA mg/dl Negative   NITRITE UA  Negative   BILIRUBIN UA  Negative   UROBILINOGEN UA E.U./dl 1.0   LEUKOCYTES UA  Negative   WBC UA /hpf None Seen   RBC UA /hpf None Seen   BACTERIA UA /hpf None Seen   EPITHELIAL CELLS WET PREP /hpf Occasional     Results from last 7 days   Lab Units 11/30/23 2011   INFLUENZA A PCR  Negative   INFLUENZA B PCR  Negative   RSV PCR  Negative       Results from last 7 days   Lab Units 11/30/23 1954   ETHANOL LVL mg/dL 419*       ED Treatment:   Medication Administration from 11/30/2023 1922 to 11/30/2023 2328         Date/Time Order Dose Route Action     11/30/2023 1954 EST sodium chloride 0.9 % bolus 1,000 mL 1,000 mL Intravenous New Bag     11/30/2023 2052 EST albuterol inhalation solution 5 mg 5 mg Nebulization Given     11/30/2023 2052 EST ipratropium (ATROVENT) 0.02 % inhalation solution 0.5 mg 0.5 mg Nebulization Given          Past Medical History:   Diagnosis Date    Acute on chronic kidney failure      Alcohol withdrawal ([address]) 06/07/2019    Atrial fibrillation (HCC)     Cancer (HCC)     prostate ca,had radiation    Cardiac disease     stents,then triple bypass    COPD (chronic obstructive pulmonary disease) (HCC)     ETOH abuse     Hx of heart artery stent     2014    Hyperlipidemia     Hypertension     Hypovolemic shock ([address]) 12/22/2019    Lumbar spondylitis ([address]) 10/13/2022    Nasal bone fracture 10/10/2022    Prostate CA ([address])     S/P CABG x 3     2004    Sleep apnea      Present on Admission:   COPD (chronic obstructive pulmonary disease) ([facility])   Type 2 diabetes mellitus ([facility])   Essential hypertension   Alcohol intoxication ([address])   Acute kidney injury ([address])   CAD (coronary artery disease)   Nicotine abuse   Chronic hyponatremia   GERD (gastroesophageal reflux disease)   History of Atrial fibrillation (HCC)   Prostate cancer (HCC)      Admitting Diagnosis:     Alcohol abuse [F10.10]  Hypotension [I95.9]  S/P alcohol detoxification [Z92.89]  ENMA (acute kidney injury) ([address]) [J98.7]  Alcoholic intoxication with complication ([address]) [X90.385]    Age/Sex: 66 y.o. male    Scheduled Medications:    apixaban, 5 mg, Oral, BID  aspirin, 81 mg, Oral, Daily  fluticasone-vilanterol, 1 puff, Inhalation, Daily  folic acid, 1 mg, Oral, Daily  insulin lispro, 1-6 Units, Subcutaneous, 4x Daily (AC & HS)  levalbuterol, 1.25 mg, Nebulization, Q6H SEDA   And  ipratropium, 0.5 mg, Nebulization, Q6H [address]  magnesium Oxide, 400 mg, Oral, BID  metoprolol tartrate, 25 mg, Oral, Q12H [address]  multivitamin-minerals, 1 tablet, Oral, Daily  nicotine, 1 patch, Transdermal, Daily  pantoprazole, 40 mg, Oral, Early Morning  pravastatin, 40 mg, Oral, Daily With Dinner  ranolazine, 500 mg, Oral, Q12H SEDA  tamsulosin, 0.4 mg, Oral, Daily  Thiamine Mononitrate, 100 mg, Oral, Daily  varenicline, 1 mg, Oral, BID      Continuous IV Infusions:  sodium chloride, 125 mL/hr, Intravenous, Continuous      PRN Meds:  acetaminophen, 650 mg, Oral, Q6H PRN  albuterol, 2.5 mg, Nebulization, Q6H PRN  ondansetron, 4 mg, Intravenous, Q6H PRN        IP CONSULT TO CASE MANAGEMENT    Network Utilization Review Department  ATTENTION: Please call with any questions or concerns to [phone] and carefully listen to the prompts so that you are directed to the right person. All voicemails are confidential.   For Discharge needs, contact Care Management DC Support Team at [phone] opt. 2  Send all requests for admission clinical reviews, approved or denied determinations and any other requests to dedicated fax number below belonging to the campus where the patient is receiving treatment.  List of dedicated fax numbers for the Facilities:  [place] DENIALS (Administrative/Medical Necessity) [phone]   DISCHARGE SUPPORT TEAM (NETWORK) [id number] [facility] (Maternity/NICU/Pediatrics) [phone]   [address] [facility] [phone] [address] [facility] [address] [address] [facility] [address] [facility] [address] [phone]

## 2023-12-01 NOTE — ASSESSMENT & PLAN NOTE
Lab Results   Component Value Date    HGBA1C 6.1 (H) 09/20/2023       Recent Labs     12/02/23 2059 12/03/23  0003 12/03/23  0656 12/03/23  1114   POCGLU 140 111 128 179*       Adequate glycemic control with ISS and regular diet.     Continue home Metformin 500mg PO QD with breakfast  Continue with gabapentin milligrams 3 times daily for diabetic neuropathy

## 2023-12-01 NOTE — ASSESSMENT & PLAN NOTE
Chronic, stable. Meds include lisinopril 10 mg daily and & Lopressor 25 mg BID. Lisinopril was held in the setting of ENMA and soft BP. Creatinine trending down and is 1.32 on discharge. Plan  Continue home Lopressor .   Continue with lisinopril 10 mg daily

## 2023-12-01 NOTE — PLAN OF CARE
Problem: RESPIRATORY - ADULT  Goal: Achieves optimal ventilation and oxygenation  Description: INTERVENTIONS:  - Assess for changes in respiratory status  - Assess for changes in mentation and behavior  - Position to facilitate oxygenation and minimize respiratory effort  - Oxygen administered by appropriate delivery if ordered  - Initiate smoking cessation education as indicated  - Encourage broncho-pulmonary hygiene including cough, deep breathe, Incentive Spirometry  - Assess the need for suctioning and aspirate as needed  - Assess and instruct to report SOB or any respiratory difficulty  - Respiratory Therapy support as indicated  Outcome: Progressing     Problem: RESPIRATORY - ADULT  Goal: Achieves optimal ventilation and oxygenation  Description: INTERVENTIONS:  - Assess for changes in respiratory status  - Assess for changes in mentation and behavior  - Position to facilitate oxygenation and minimize respiratory effort  - Oxygen administered by appropriate delivery if ordered  - Initiate smoking cessation education as indicated  - Encourage broncho-pulmonary hygiene including cough, deep breathe, Incentive Spirometry  - Assess the need for suctioning and aspirate as needed  - Assess and instruct to report SOB or any respiratory difficulty  - Respiratory Therapy support as indicated  12/1/2023 0514 by Perla Sotomayor RN  Outcome: Progressing  12/1/2023 0513 by Perla Sotomayor RN  Outcome: Progressing     Problem: PAIN - ADULT  Goal: Verbalizes/displays adequate comfort level or baseline comfort level  Description: Interventions:  - Encourage patient to monitor pain and request assistance  - Assess pain using appropriate pain scale  - Administer analgesics based on type and severity of pain and evaluate response  - Implement non-pharmacological measures as appropriate and evaluate response  - Consider cultural and social influences on pain and pain management  - Notify physician/advanced practitioner if interventions unsuccessful or patient reports new pain  Outcome: Progressing     Problem: INFECTION - ADULT  Goal: Absence or prevention of progression during hospitalization  Description: INTERVENTIONS:  - Assess and monitor for signs and symptoms of infection  - Monitor lab/diagnostic results  - Monitor all insertion sites, i.e. indwelling lines, tubes, and drains  - Monitor endotracheal if appropriate and nasal secretions for changes in amount and color  - [place] appropriate cooling/warming therapies per order  - Administer medications as ordered  - Instruct and encourage patient and family to use good hand hygiene technique  - Identify and instruct in appropriate isolation precautions for identified infection/condition  Outcome: Progressing  Goal: Absence of fever/infection during neutropenic period  Description: INTERVENTIONS:  - Monitor WBC    Outcome: Progressing     Problem: SAFETY ADULT  Goal: Patient will remain free of falls  Description: INTERVENTIONS:  - Educate patient/family on patient safety including physical limitations  - Instruct patient to call for assistance with activity   - Consult OT/PT to assist with strengthening/mobility   - Keep Call bell within reach  - Keep bed low and locked with side rails adjusted as appropriate  - Keep care items and personal belongings within reach  - Initiate and maintain comfort rounds  - Make Fall Risk Sign visible to staff  - Offer Toileting every 2 Hours, in advance of need  - Initiate/Maintain bed alarm  - Apply yellow socks and bracelet for high fall risk patients  - Consider moving patient to room near nurses station  Outcome: Progressing  Goal: Maintain or return to baseline ADL function  Description: INTERVENTIONS:  -  Assess patient's ability to carry out ADLs; assess patient's baseline for ADL function and identify physical deficits which impact ability to perform ADLs (bathing, care of mouth/teeth, toileting, grooming, dressing, etc.)  - Assess/evaluate cause of self-care deficits   - Assess range of motion  - Assess patient's mobility; develop plan if impaired  - Assess patient's need for assistive devices and provide as appropriate  - Encourage maximum independence but intervene and supervise when necessary  - Involve family in performance of ADLs  - Assess for home care needs following discharge   - Consider OT consult to assist with ADL evaluation and planning for discharge  - Provide patient education as appropriate  Outcome: Progressing  Goal: Maintains/Returns to pre admission functional level  Description: INTERVENTIONS:  - Perform AM-PAC 6 Click Basic Mobility/ Daily Activity assessment daily.  - Set and communicate daily mobility goal to care team and patient/family/caregiver. - Collaborate with rehabilitation services on mobility goals if consulted  - Perform Range of Motion 2 times a day. - Reposition patient every 2 hours.   - Dangle patient 2 times a day  - Stand patient 2 times a day  - Ambulate patient 2 times a day  - Out of bed to chair 2 times a day   - Out of bed for meals 3 times a day  - Out of bed for toileting  - Record patient progress and toleration of activity level   Outcome: Progressing     Problem: DISCHARGE PLANNING  Goal: Discharge to home or other facility with appropriate resources  Description: INTERVENTIONS:  - Identify barriers to discharge w/patient and caregiver  - Arrange for needed discharge resources and transportation as appropriate  - Identify discharge learning needs (meds, wound care, etc.)  - Arrange for interpretive services to assist at discharge as needed  - Refer to Case Management Department for coordinating discharge planning if the patient needs post-hospital services based on physician/advanced practitioner order or complex needs related to functional status, cognitive ability, or social support system  Outcome: Progressing

## 2023-12-01 NOTE — CASE MANAGEMENT
Case Management Assessment & Discharge Planning Note    Patient name Nitesh Rosenberg  Location 2 [facility] 201/2 [address] MRN [id number]  : 1960 Date 2023       Current Admission Date: 2023  Current Admission Diagnosis:Alcohol intoxication [facility])   Patient Active Problem List    Diagnosis Date Noted    Melena 10/29/2023    Abnormal thyroid stimulating hormone level 2023    Chest pain 2023    Paroxysmal atrial fibrillation with rapid ventricular response ([address]) 2023    GERD (gastroesophageal reflux disease) 2023    Sleep apnea 2023    Stable angina 2022    Stage 3a chronic kidney disease ([address]) 2022    Fatty liver, alcoholic     Nonischemic nontraumatic myocardial injury 04/10/2022    History of atrial fibrillation 10/25/2021    Poor historian 10/24/2021    Mild protein-calorie malnutrition ([address]) 2021    Prostate cancer ([address]) 2021    History of Atrial fibrillation ([address]) 2020    Encounter for smoking cessation counseling 2020    Chronic heart failure with preserved ejection fraction ([address]) 2019    Alcohol intoxication ([address]) 2019    Closed fracture of multiple ribs of right side 2019    Acute kidney injury ([address])     Alcohol abuse 10/17/2019    Chronic hyponatremia 10/17/2019    Cervical spinal stenosis 10/17/2019    Thrombocytopenia ([address]) 2019    History of prostate cancer 2019    CAD (coronary artery disease) 2019    Nicotine abuse 2019    Hypomagnesemia 10/10/2018    Depression, recurrent ([address]) 10/10/2018    Hx of CABG 10/10/2018    Dyslipidemia 10/10/2018    COPD (chronic obstructive pulmonary disease) ([address]) 10/09/2018    Type 2 diabetes mellitus ([address]) 10/09/2018    Essential hypertension 10/09/2018      LOS (days): 1  Geometric Mean LOS (GMLOS) (days): 3.10  Days to GMLOS:2.3     OBJECTIVE:  PATIENT READMITTED TO HOSPITAL  Risk of Unplanned Readmission Score: 51.77     Current admission status: Inpatient  Referral Reason: Drug/Alcohol Abuse    Preferred Pharmacy:   [address] [address]  Phone: [phone] Fax: [phone]    Primary Care Provider: Zeina Pittman MD    Primary Insurance: [facility]  Secondary Insurance:     ASSESSMENT:  Jeremiah Proxies    There are no active Health Care Proxies on file. Advance Directives  Does patient currently have a Health Care decision maker?: Yes, please see Health Care Proxy section  Primary Contact: Long Mares. Readmission Root Cause  30 Day Readmission: Yes  Who directed you to return to the hospital?: Self  Did you understand whom to contact if you had questions or problems?: Yes  Did you get your prescriptions before you left the hospital?: No  Reason[de-identified] Preference for own pharmacy  Were you able to get your prescriptions filled when you left the hospital?: Yes (but there was a 3 day delay)  Did you take your medications as prescribed?: Yes  Were you able to get to your follow-up appointments?: No (no follow up appointments on AVS)  Reason[de-identified] Other (comment) (on waiting list for nephrology appt in [place])  During previous admission, was a post-acute recommendation made?: No  Patient was readmitted due to: alcohol intoxication  Action Plan: medical management, alcohol cessation support    Patient Information  Admitted from[de-identified] Home  Mental Status: Alert  During Assessment patient was accompanied by: Not accompanied during assessment  Assessment information provided by[de-identified] Patient  Primary Caregiver: Self  Support Systems: Self, Son, [place] of Residence: [address] [place] [address] do you live in?: [facility] entry access options.  Select all that apply.: Elevator  Type of Current Residence: Apartment  Floor Level: 5  Upon entering residence, is there a bedroom on the main floor (no further steps)?: Yes  Upon entering residence, is there a bathroom on the main floor (no further steps)?: Yes  Living Arrangements: Lives Alone    Activities of Daily Living Prior to Admission  Functional Status: Independent  Completes ADLs independently?: Yes  Ambulates independently?: Yes  Does patient currently own DME?: Yes  What DME does the patient currently own?: [employer] Lamont  Does patient have a history of Outpatient Therapy (PT/OT)?: No  Does the patient have a history of Short-Term Rehab?: No  Does patient have a history of HHC?: No  Does patient currently have [address]?: No    Patient Information Continued  Income Source: SSI/SSD  Does patient have prescription coverage?: Yes ([facility]-delivery)  Does patient receive dialysis treatments?: No  Does patient have a history of substance abuse?: Yes  Historical substance use preference: Alcohol/ETOH  Is patient currently in treatment for substance abuse?: Yes (per pt he has been receiving services through [facility] but it hasn't been going to good)  Does patient have a history of Mental Health Diagnosis?: No    Means of Transportation  Means of Transport to [facility][de-identified] Public Transportation - Bus ([address] [facility])      Housing Stability: Low Risk  (12/1/2023)    Housing Stability Vital Sign     Unable to Pay for Housing in the Last Year: No     Number of [address] in the Last Year: 1     Unstable Housing in the Last Year: No   Food Insecurity: No Food Insecurity (12/1/2023)    Hunger Vital Sign     Worried About Running Out of Food in the Last Year: Never true     [address] in the Last Year: Never true   Transportation Needs: No Transportation Needs (12/1/2023)    PRAPARE - Transportation     Lack of Transportation (Medical): No     Lack of Transportation (Non-Medical):  No   Utilities: Not At Risk (12/1/2023)    [facility] Utilities     Threatened with loss of utilities: No       DISCHARGE DETAILS:    Discharge planning discussed with[de-identified] Patient  Freedom of Choice: Yes  Comments - Freedom of Choice: SW met with pt to assess needs and discuss plans. Pt's plan is to return home when discharged. Per pt he has a colonoscopy planned for 12/7/23 that he needs to have done. SW talked with pt about his continued alcohol use. Per pt he had been trying to work with [employer] (Intoxicated [place]) and [facility] for treatment. Pt said that he had not been drinking too much until he ran out of the medication he takes to help, Naltrexone. Pt said he had run out and had difficulty getting his PCP to refill prescription. Per pt he does have the medication now. SW offered referral to [facility], alcohol abuse treatment program through [facility] for additional support. Pt was open to referral.  SW spoke with Kai Lopez, ED crisis , and he made the referral.  No other discharge needs expressed by pt at this time. SW will continue to assist if needed. Requested [address]          Is the patient interested in [facility] at discharge?: No    DME Referral Provided  Referral made for DME?: No    Other Referral/Resources/Interventions Provided:  Interventions:  Other (Specify) ([facility] referral)    Treatment Team Recommendation: Home  Discharge Destination Plan[de-identified] Home  Transport at Discharge : [employer] (pt uses [address])    IMM Given (Date):: 11/30/23 (Initial)

## 2023-12-01 NOTE — ASSESSMENT & PLAN NOTE
Pt states that he has been binge drinking and wants to detox  Pt refused to transfer to [facility] for alcohol rehab  Pt did not have any symptoms of withdrawal POA  Ethanol: 419  He was placed on CIWA protocol and received a total of 5 mg of Ativan. Also started on thiamine, multivitamin, folic acid patient withdrawal symptoms and was stable at discharge    Plan  Continue to take thiamine, folate and multivitamins   Patient to take naltrexone that prescribed during previous hospitalization  Restart home MAG-OX 400mg BID  Follow-up with outpatient alcohol program from crisis/[facility].    Follow-up with PCP after discharge

## 2023-12-01 NOTE — PLAN OF CARE
Problem: RESPIRATORY - ADULT  Goal: Achieves optimal ventilation and oxygenation  Description: INTERVENTIONS:  - Assess for changes in respiratory status  - Assess for changes in mentation and behavior  - Position to facilitate oxygenation and minimize respiratory effort  - Oxygen administered by appropriate delivery if ordered  - Initiate smoking cessation education as indicated  - Encourage broncho-pulmonary hygiene including cough, deep breathe, Incentive Spirometry  - Assess the need for suctioning and aspirate as needed  - Assess and instruct to report SOB or any respiratory difficulty  - Respiratory Therapy support as indicated  Outcome: Progressing     Problem: PAIN - ADULT  Goal: Verbalizes/displays adequate comfort level or baseline comfort level  Description: Interventions:  - Encourage patient to monitor pain and request assistance  - Assess pain using appropriate pain scale  - Administer analgesics based on type and severity of pain and evaluate response  - Implement non-pharmacological measures as appropriate and evaluate response  - Consider cultural and social influences on pain and pain management  - Notify physician/advanced practitioner if interventions unsuccessful or patient reports new pain  Outcome: Progressing     Problem: INFECTION - ADULT  Goal: Absence or prevention of progression during hospitalization  Description: INTERVENTIONS:  - Assess and monitor for signs and symptoms of infection  - Monitor lab/diagnostic results  - Monitor all insertion sites, i.e. indwelling lines, tubes, and drains  - Monitor endotracheal if appropriate and nasal secretions for changes in amount and color  - [place] appropriate cooling/warming therapies per order  - Administer medications as ordered  - Instruct and encourage patient and family to use good hand hygiene technique  - Identify and instruct in appropriate isolation precautions for identified infection/condition  Outcome: Progressing  Goal: Absence of fever/infection during neutropenic period  Description: INTERVENTIONS:  - Monitor WBC    Outcome: Progressing     Problem: SAFETY ADULT  Goal: Patient will remain free of falls  Description: INTERVENTIONS:  - Educate patient/family on patient safety including physical limitations  - Instruct patient to call for assistance with activity   - Consult OT/PT to assist with strengthening/mobility   - Keep Call bell within reach  - Keep bed low and locked with side rails adjusted as appropriate  - Keep care items and personal belongings within reach  - Initiate and maintain comfort rounds  - Make Fall Risk Sign visible to staff  - Offer Toileting every 2 Hours, in advance of need  - Initiate/Maintain bed alarm  - Obtain necessary fall risk management equipment: bed alarm  - Apply yellow socks and bracelet for high fall risk patients  - Consider moving patient to room near nurses station  Outcome: Progressing  Goal: Maintain or return to baseline ADL function  Description: INTERVENTIONS:  -  Assess patient's ability to carry out ADLs; assess patient's baseline for ADL function and identify physical deficits which impact ability to perform ADLs (bathing, care of mouth/teeth, toileting, grooming, dressing, etc.)  - Assess/evaluate cause of self-care deficits   - Assess range of motion  - Assess patient's mobility; develop plan if impaired  - Assess patient's need for assistive devices and provide as appropriate  - Encourage maximum independence but intervene and supervise when necessary  - Involve family in performance of ADLs  - Assess for home care needs following discharge   - Consider OT consult to assist with ADL evaluation and planning for discharge  - Provide patient education as appropriate  Outcome: Progressing  Goal: Maintains/Returns to pre admission functional level  Description: INTERVENTIONS:  - Perform AM-PAC 6 Click Basic Mobility/ Daily Activity assessment daily.  - Set and communicate daily mobility goal to care team and patient/family/caregiver. - Collaborate with rehabilitation services on mobility goals if consulted  - Perform Range of Motion 3 times a day. - Reposition patient every 2 hours.   - Dangle patient 3 times a day  - Stand patient 3 times a day  - Ambulate patient 3 times a day  - Out of bed to chair 3 times a day   - Out of bed for meals 3 times a day  - Out of bed for toileting  - Record patient progress and toleration of activity level   Outcome: Progressing     Problem: DISCHARGE PLANNING  Goal: Discharge to home or other facility with appropriate resources  Description: INTERVENTIONS:  - Identify barriers to discharge w/patient and caregiver  - Arrange for needed discharge resources and transportation as appropriate  - Identify discharge learning needs (meds, wound care, etc.)  - Arrange for interpretive services to assist at discharge as needed  - Refer to Case Management Department for coordinating discharge planning if the patient needs post-hospital services based on physician/advanced practitioner order or complex needs related to functional status, cognitive ability, or social support system  Outcome: Progressing

## 2023-12-01 NOTE — ASSESSMENT & PLAN NOTE
Due to on chronic in the setting of chronic alcohol use, volume depletion, poor oral intake and possible SIADH. Pt was on sodium tablet 1g QID for 2 weeks at last discharge. Na 31 POA baseline 129-30. After starting patient on IV fluids NS 120ml/hr, sodium levels improved to 131. IV Fluids were then discontinued.  Na on 12/3/2023 dropped to 129      Plan  Patient to be discharged on sodium chloride tablets 1 g 3 times a day for 2 weeks  Discussed with patient to repeat BMP at of this week  Continue fluid restriction  Follow-up with PCP after discharge

## 2023-12-02 LAB
ALBUMIN SERPL BCP-MCNC: 3.7 G/DL (ref 3.5–5)
ALP SERPL-CCNC: 57 U/L (ref 34–104)
ALT SERPL W P-5'-P-CCNC: 36 U/L (ref 7–52)
ANION GAP SERPL CALCULATED.3IONS-SCNC: 11 MMOL/L
AST SERPL W P-5'-P-CCNC: 32 U/L (ref 13–39)
BASOPHILS # BLD AUTO: 0.06 THOUSANDS/ÂΜL (ref 0–0.1)
BASOPHILS NFR BLD AUTO: 1 % (ref 0–1)
BILIRUB SERPL-MCNC: 0.68 MG/DL (ref 0.2–1)
BUN SERPL-MCNC: 31 MG/DL (ref 5–25)
CALCIUM SERPL-MCNC: 8.7 MG/DL (ref 8.4–10.2)
CHLORIDE SERPL-SCNC: 94 MMOL/L (ref 96–108)
CO2 SERPL-SCNC: 26 MMOL/L (ref 21–32)
CREAT SERPL-MCNC: 1.46 MG/DL (ref 0.6–1.3)
EOSINOPHIL # BLD AUTO: 0.1 THOUSAND/ÂΜL (ref 0–0.61)
EOSINOPHIL NFR BLD AUTO: 2 % (ref 0–6)
ERYTHROCYTE [DISTWIDTH] IN BLOOD BY AUTOMATED COUNT: 14.6 % (ref 11.6–15.1)
GFR SERPL CREATININE-BSD FRML MDRD: 51 ML/MIN/1.73SQ M
GLUCOSE SERPL-MCNC: 109 MG/DL (ref 65–140)
GLUCOSE SERPL-MCNC: 113 MG/DL (ref 65–140)
GLUCOSE SERPL-MCNC: 118 MG/DL (ref 65–140)
GLUCOSE SERPL-MCNC: 120 MG/DL (ref 65–140)
GLUCOSE SERPL-MCNC: 140 MG/DL (ref 65–140)
GLUCOSE SERPL-MCNC: 149 MG/DL (ref 65–140)
GLUCOSE SERPL-MCNC: 170 MG/DL (ref 65–140)
HCT VFR BLD AUTO: 34.3 % (ref 36.5–49.3)
HGB BLD-MCNC: 12 G/DL (ref 12–17)
IMM GRANULOCYTES # BLD AUTO: 0.04 THOUSAND/UL (ref 0–0.2)
IMM GRANULOCYTES NFR BLD AUTO: 1 % (ref 0–2)
LYMPHOCYTES # BLD AUTO: 1.22 THOUSANDS/ÂΜL (ref 0.6–4.47)
LYMPHOCYTES NFR BLD AUTO: 18 % (ref 14–44)
MAGNESIUM SERPL-MCNC: 1.7 MG/DL (ref 1.9–2.7)
MCH RBC QN AUTO: 34.2 PG (ref 26.8–34.3)
MCHC RBC AUTO-ENTMCNC: 35 G/DL (ref 31.4–37.4)
MCV RBC AUTO: 98 FL (ref 82–98)
MONOCYTES # BLD AUTO: 0.84 THOUSAND/ÂΜL (ref 0.17–1.22)
MONOCYTES NFR BLD AUTO: 12 % (ref 4–12)
NEUTROPHILS # BLD AUTO: 4.63 THOUSANDS/ÂΜL (ref 1.85–7.62)
NEUTS SEG NFR BLD AUTO: 66 % (ref 43–75)
NRBC BLD AUTO-RTO: 0 /100 WBCS
PLATELET # BLD AUTO: 103 THOUSANDS/UL (ref 149–390)
PMV BLD AUTO: 11 FL (ref 8.9–12.7)
POTASSIUM SERPL-SCNC: 4.3 MMOL/L (ref 3.5–5.3)
PROT SERPL-MCNC: 6.8 G/DL (ref 6.4–8.4)
RBC # BLD AUTO: 3.51 MILLION/UL (ref 3.88–5.62)
SODIUM SERPL-SCNC: 131 MMOL/L (ref 135–147)
WBC # BLD AUTO: 6.89 THOUSAND/UL (ref 4.31–10.16)

## 2023-12-02 PROCEDURE — 97163 PT EVAL HIGH COMPLEX 45 MIN: CPT | Performed by: PHYSICAL THERAPIST

## 2023-12-02 PROCEDURE — 83735 ASSAY OF MAGNESIUM: CPT

## 2023-12-02 PROCEDURE — 99223 1ST HOSP IP/OBS HIGH 75: CPT | Performed by: INTERNAL MEDICINE

## 2023-12-02 PROCEDURE — 97530 THERAPEUTIC ACTIVITIES: CPT | Performed by: PHYSICAL THERAPIST

## 2023-12-02 PROCEDURE — 82948 REAGENT STRIP/BLOOD GLUCOSE: CPT

## 2023-12-02 PROCEDURE — 94760 N-INVAS EAR/PLS OXIMETRY 1: CPT

## 2023-12-02 PROCEDURE — 94640 AIRWAY INHALATION TREATMENT: CPT

## 2023-12-02 PROCEDURE — 85025 COMPLETE CBC W/AUTO DIFF WBC: CPT

## 2023-12-02 PROCEDURE — 99233 SBSQ HOSP IP/OBS HIGH 50: CPT | Performed by: INTERNAL MEDICINE

## 2023-12-02 PROCEDURE — 80053 COMPREHEN METABOLIC PANEL: CPT

## 2023-12-02 RX ORDER — GABAPENTIN 100 MG/1
100 CAPSULE ORAL 3 TIMES DAILY
Status: DISCONTINUED | OUTPATIENT
Start: 2023-12-02 | End: 2023-12-03 | Stop reason: HOSPADM

## 2023-12-02 RX ORDER — MAGNESIUM SULFATE HEPTAHYDRATE 40 MG/ML
4 INJECTION, SOLUTION INTRAVENOUS ONCE
Status: COMPLETED | OUTPATIENT
Start: 2023-12-02 | End: 2023-12-02

## 2023-12-02 RX ADMIN — LEVALBUTEROL HYDROCHLORIDE 1.25 MG: 1.25 SOLUTION RESPIRATORY (INHALATION) at 19:43

## 2023-12-02 RX ADMIN — Medication 400 MG: at 08:33

## 2023-12-02 RX ADMIN — FLUTICASONE FUROATE AND VILANTEROL TRIFENATATE 1 PUFF: 200; 25 POWDER RESPIRATORY (INHALATION) at 08:34

## 2023-12-02 RX ADMIN — INSULIN LISPRO 1 UNITS: 100 INJECTION, SOLUTION INTRAVENOUS; SUBCUTANEOUS at 11:46

## 2023-12-02 RX ADMIN — RANOLAZINE 500 MG: 500 TABLET, FILM COATED, EXTENDED RELEASE ORAL at 08:33

## 2023-12-02 RX ADMIN — NICOTINE 1 PATCH: 21 PATCH, EXTENDED RELEASE TRANSDERMAL at 08:33

## 2023-12-02 RX ADMIN — Medication 400 MG: at 17:49

## 2023-12-02 RX ADMIN — TAMSULOSIN HYDROCHLORIDE 0.4 MG: 0.4 CAPSULE ORAL at 08:33

## 2023-12-02 RX ADMIN — METOPROLOL TARTRATE 25 MG: 25 TABLET, FILM COATED ORAL at 22:01

## 2023-12-02 RX ADMIN — FOLIC ACID 1 MG: 1 TABLET ORAL at 08:33

## 2023-12-02 RX ADMIN — THIAMINE HCL TAB 100 MG 100 MG: 100 TAB at 08:33

## 2023-12-02 RX ADMIN — METOPROLOL TARTRATE 25 MG: 25 TABLET, FILM COATED ORAL at 08:33

## 2023-12-02 RX ADMIN — MAGNESIUM SULFATE HEPTAHYDRATE 4 G: 40 INJECTION, SOLUTION INTRAVENOUS at 11:07

## 2023-12-02 RX ADMIN — IPRATROPIUM BROMIDE 0.5 MG: 0.5 SOLUTION RESPIRATORY (INHALATION) at 19:43

## 2023-12-02 RX ADMIN — APIXABAN 5 MG: 5 TABLET, FILM COATED ORAL at 17:49

## 2023-12-02 RX ADMIN — LEVALBUTEROL HYDROCHLORIDE 1.25 MG: 1.25 SOLUTION RESPIRATORY (INHALATION) at 13:01

## 2023-12-02 RX ADMIN — PRAVASTATIN SODIUM 40 MG: 40 TABLET ORAL at 16:50

## 2023-12-02 RX ADMIN — LORAZEPAM 2 MG: 1 TABLET ORAL at 16:52

## 2023-12-02 RX ADMIN — RANOLAZINE 500 MG: 500 TABLET, FILM COATED, EXTENDED RELEASE ORAL at 22:00

## 2023-12-02 RX ADMIN — ASPIRIN 81 MG CHEWABLE TABLET 81 MG: 81 TABLET CHEWABLE at 08:33

## 2023-12-02 RX ADMIN — Medication 1 TABLET: at 08:33

## 2023-12-02 RX ADMIN — IPRATROPIUM BROMIDE 0.5 MG: 0.5 SOLUTION RESPIRATORY (INHALATION) at 13:01

## 2023-12-02 RX ADMIN — GABAPENTIN 100 MG: 100 CAPSULE ORAL at 10:40

## 2023-12-02 RX ADMIN — GABAPENTIN 100 MG: 100 CAPSULE ORAL at 16:51

## 2023-12-02 RX ADMIN — GABAPENTIN 100 MG: 100 CAPSULE ORAL at 22:01

## 2023-12-02 RX ADMIN — APIXABAN 5 MG: 5 TABLET, FILM COATED ORAL at 08:33

## 2023-12-02 RX ADMIN — PANTOPRAZOLE SODIUM 40 MG: 40 TABLET, DELAYED RELEASE ORAL at 06:56

## 2023-12-02 NOTE — PLAN OF CARE
Problem: PHYSICAL THERAPY ADULT  Goal: Performs mobility at highest level of function for planned discharge setting. See evaluation for individualized goals. Description: Treatment/Interventions: ADL retraining, Functional transfer training, LE strengthening/ROM, Therapeutic exercise, Bed mobility, Gait training, Spoke to nursing  Equipment Recommended:  (rollator- patient reporting he had a rollator, but was misplaced in his building. He reports borrowing RW from his friend currently.)       See flowsheet documentation for full assessment, interventions and recommendations. Note:    Problem List: Decreased strength, Decreased endurance, Impaired balance, Decreased mobility, Pain  Assessment: Patient seen for Physical Therapy evaluation. Patient admitted with Alcohol intoxication ([address]). Comorbidities affecting patient's physical performance include: Afib, ENMA, CAD, cancer, COPD, diabetes, GERD, and hypertension. Personal factors affecting patient at time of initial evaluation include: ambulating with assistive device, inability to ambulate household distances, inability to navigate community distances, limited home support, positive fall history, inability to perform ADLS, and inability to perform IADLS . Prior to admission, patient was independent with functional mobility with rollator and RW, independent with ADLS, independent with IADLS, ambulating household distance, and ambulating community distances. Please find objective findings from Physical Therapy assessment regarding body systems outlined above with impairments and limitations including weakness, decreased ROM, impaired balance, decreased endurance, gait deviations, pain, decreased activity tolerance, decreased functional mobility tolerance, and fall risk. The Barthel Index was used as a functional outcome tool presenting with a score of Barthel Index Score: 55 today indicating moderate limitations of functional mobility and ADLS.   Patient's clinical presentation is currently evolving as seen in patient's presentation of changing level of pain, increased fall risk, new onset of impairment of functional mobility, decreased endurance, and new onset of weakness. Pt would benefit from continued Physical Therapy treatment to address deficits as defined above and maximize level of functional mobility. As demonstrated by objective findings, the assigned level of complexity for this evaluation is high. The patient's AM-[facility] Basic Mobility Inpatient Short Form Raw Score is 15. A Raw score of less than or equal to 16 suggests the patient may benefit from discharge to post-acute rehabilitation services. Please also refer to the recommendation of the Physical Therapist for safe discharge planning. Barriers to Discharge: Decreased caregiver support     Rehab Resource Intensity Level, PT: III (Minimum Resource Intensity)    See flowsheet documentation for full assessment.

## 2023-12-02 NOTE — PLAN OF CARE
Problem: RESPIRATORY - ADULT  Goal: Achieves optimal ventilation and oxygenation  Description: INTERVENTIONS:  - Assess for changes in respiratory status  - Assess for changes in mentation and behavior  - Position to facilitate oxygenation and minimize respiratory effort  - Oxygen administered by appropriate delivery if ordered  - Initiate smoking cessation education as indicated  - Encourage broncho-pulmonary hygiene including cough, deep breathe, Incentive Spirometry  - Assess the need for suctioning and aspirate as needed  - Assess and instruct to report SOB or any respiratory difficulty  - Respiratory Therapy support as indicated  Outcome: Progressing     Problem: PAIN - ADULT  Goal: Verbalizes/displays adequate comfort level or baseline comfort level  Description: Interventions:  - Encourage patient to monitor pain and request assistance  - Assess pain using appropriate pain scale  - Administer analgesics based on type and severity of pain and evaluate response  - Implement non-pharmacological measures as appropriate and evaluate response  - Consider cultural and social influences on pain and pain management  - Notify physician/advanced practitioner if interventions unsuccessful or patient reports new pain  Outcome: Progressing     Problem: INFECTION - ADULT  Goal: Absence or prevention of progression during hospitalization  Description: INTERVENTIONS:  - Assess and monitor for signs and symptoms of infection  - Monitor lab/diagnostic results  - Monitor all insertion sites, i.e. indwelling lines, tubes, and drains  - Monitor endotracheal if appropriate and nasal secretions for changes in amount and color  - [place] appropriate cooling/warming therapies per order  - Administer medications as ordered  - Instruct and encourage patient and family to use good hand hygiene technique  - Identify and instruct in appropriate isolation precautions for identified infection/condition  Outcome: Progressing  Goal: Absence of fever/infection during neutropenic period  Description: INTERVENTIONS:  - Monitor WBC    Outcome: Progressing     Problem: SAFETY ADULT  Goal: Patient will remain free of falls  Description: INTERVENTIONS:  - Educate patient/family on patient safety including physical limitations  - Instruct patient to call for assistance with activity   - Consult OT/PT to assist with strengthening/mobility   - Keep Call bell within reach  - Keep bed low and locked with side rails adjusted as appropriate  - Keep care items and personal belongings within reach  - Initiate and maintain comfort rounds  - Make Fall Risk Sign visible to staff  - Offer Toileting every 2 Hours, in advance of need  - Initiate/Maintain bed alarm  - Apply yellow socks and bracelet for high fall risk patients  - Consider moving patient to room near nurses station  Outcome: Progressing  Goal: Maintain or return to baseline ADL function  Description: INTERVENTIONS:  -  Assess patient's ability to carry out ADLs; assess patient's baseline for ADL function and identify physical deficits which impact ability to perform ADLs (bathing, care of mouth/teeth, toileting, grooming, dressing, etc.)  - Assess/evaluate cause of self-care deficits   - Assess range of motion  - Assess patient's mobility; develop plan if impaired  - Assess patient's need for assistive devices and provide as appropriate  - Encourage maximum independence but intervene and supervise when necessary  - Involve family in performance of ADLs  - Assess for home care needs following discharge   - Consider OT consult to assist with ADL evaluation and planning for discharge  - Provide patient education as appropriate  Outcome: Progressing  Goal: Maintains/Returns to pre admission functional level  Description: INTERVENTIONS:  - Perform AM-PAC 6 Click Basic Mobility/ Daily Activity assessment daily.  - Set and communicate daily mobility goal to care team and patient/family/caregiver.    - Collaborate with rehabilitation services on mobility goals if consulted  - Perform Range of Motion 2 times a day. - Reposition patient every 2 hours. - Dangle patient 2 times a day  - Stand patient 2 times a day  - Ambulate patient 2 times a day  - Out of bed to chair 2 times a day   - Out of bed for meals 3 times a day  - Out of bed for toileting  - Record patient progress and toleration of activity level   Outcome: Progressing     Problem: DISCHARGE PLANNING  Goal: Discharge to home or other facility with appropriate resources  Description: INTERVENTIONS:  - Identify barriers to discharge w/patient and caregiver  - Arrange for needed discharge resources and transportation as appropriate  - Identify discharge learning needs (meds, wound care, etc.)  - Arrange for interpretive services to assist at discharge as needed  - Refer to Case Management Department for coordinating discharge planning if the patient needs post-hospital services based on physician/advanced practitioner order or complex needs related to functional status, cognitive ability, or social support system  Outcome: Progressing     Problem: Nutrition/Hydration-ADULT  Goal: Nutrient/Hydration intake appropriate for improving, restoring or maintaining nutritional needs  Description: Monitor and assess patient's nutrition/hydration status for malnutrition. Collaborate with interdisciplinary team and initiate plan and interventions as ordered. Monitor patient's weight and dietary intake as ordered or per policy. Utilize nutrition screening tool and intervene as necessary. Determine patient's food preferences and provide high-protein, high-caloric foods as appropriate.      INTERVENTIONS:  - Monitor oral intake, urinary output, labs, and treatment plans  - Assess nutrition and hydration status and recommend course of action  - Evaluate amount of meals eaten  - Assist patient with eating if necessary   - Allow adequate time for meals  - Recommend/ encourage appropriate diets, oral nutritional supplements, and vitamin/mineral supplements  - Order, calculate, and assess calorie counts as needed  - Recommend, monitor, and adjust tube feedings and TPN/PPN based on assessed needs  - Assess need for intravenous fluids  - Provide specific nutrition/hydration education as appropriate  - Include patient/family/caregiver in decisions related to nutrition  Outcome: Progressing

## 2023-12-02 NOTE — PLAN OF CARE
Problem: RESPIRATORY - ADULT  Goal: Achieves optimal ventilation and oxygenation  Description: INTERVENTIONS:  - Assess for changes in respiratory status  - Assess for changes in mentation and behavior  - Position to facilitate oxygenation and minimize respiratory effort  - Oxygen administered by appropriate delivery if ordered  - Initiate smoking cessation education as indicated  - Encourage broncho-pulmonary hygiene including cough, deep breathe, Incentive Spirometry  - Assess the need for suctioning and aspirate as needed  - Assess and instruct to report SOB or any respiratory difficulty  - Respiratory Therapy support as indicated  Outcome: Progressing     Problem: PAIN - ADULT  Goal: Verbalizes/displays adequate comfort level or baseline comfort level  Description: Interventions:  - Encourage patient to monitor pain and request assistance  - Assess pain using appropriate pain scale  - Administer analgesics based on type and severity of pain and evaluate response  - Implement non-pharmacological measures as appropriate and evaluate response  - Consider cultural and social influences on pain and pain management  - Notify physician/advanced practitioner if interventions unsuccessful or patient reports new pain  Outcome: Progressing     Problem: INFECTION - ADULT  Goal: Absence or prevention of progression during hospitalization  Description: INTERVENTIONS:  - Assess and monitor for signs and symptoms of infection  - Monitor lab/diagnostic results  - Monitor all insertion sites, i.e. indwelling lines, tubes, and drains  - Monitor endotracheal if appropriate and nasal secretions for changes in amount and color  - [place] appropriate cooling/warming therapies per order  - Administer medications as ordered  - Instruct and encourage patient and family to use good hand hygiene technique  - Identify and instruct in appropriate isolation precautions for identified infection/condition  Outcome: Progressing  Goal: Absence of fever/infection during neutropenic period  Description: INTERVENTIONS:  - Monitor WBC    Outcome: Progressing     Problem: SAFETY ADULT  Goal: Patient will remain free of falls  Description: INTERVENTIONS:  - Educate patient/family on patient safety including physical limitations  - Instruct patient to call for assistance with activity   - Consult OT/PT to assist with strengthening/mobility   - Keep Call bell within reach  - Keep bed low and locked with side rails adjusted as appropriate  - Keep care items and personal belongings within reach  - Initiate and maintain comfort rounds  - Make Fall Risk Sign visible to staff  - Offer Toileting every 2 Hours, in advance of need  - Initiate/Maintain bed alarm  - Obtain necessary fall risk management equipment: yellow sign  - Apply yellow socks and bracelet for high fall risk patients  - Consider moving patient to room near nurses station  Outcome: Progressing  Goal: Maintain or return to baseline ADL function  Description: INTERVENTIONS:  -  Assess patient's ability to carry out ADLs; assess patient's baseline for ADL function and identify physical deficits which impact ability to perform ADLs (bathing, care of mouth/teeth, toileting, grooming, dressing, etc.)  - Assess/evaluate cause of self-care deficits   - Assess range of motion  - Assess patient's mobility; develop plan if impaired  - Assess patient's need for assistive devices and provide as appropriate  - Encourage maximum independence but intervene and supervise when necessary  - Involve family in performance of ADLs  - Assess for home care needs following discharge   - Consider OT consult to assist with ADL evaluation and planning for discharge  - Provide patient education as appropriate  Outcome: Progressing  Goal: Maintains/Returns to pre admission functional level  Description: INTERVENTIONS:  - Perform AM-PAC 6 Click Basic Mobility/ Daily Activity assessment daily.  - Set and communicate daily mobility goal to care team and patient/family/caregiver. - Collaborate with rehabilitation services on mobility goals if consulted  - Perform Range of Motion 3 times a day. - Reposition patient every 2 hours. - Dangle patient 3 times a day  - Stand patient 3 times a day  - Ambulate patient 3 times a day  - Out of bed to chair 3 times a day   - Out of bed for meals 3 times a day  - Out of bed for toileting  - Record patient progress and toleration of activity level   Outcome: Progressing     Problem: DISCHARGE PLANNING  Goal: Discharge to home or other facility with appropriate resources  Description: INTERVENTIONS:  - Identify barriers to discharge w/patient and caregiver  - Arrange for needed discharge resources and transportation as appropriate  - Identify discharge learning needs (meds, wound care, etc.)  - Arrange for interpretive services to assist at discharge as needed  - Refer to Case Management Department for coordinating discharge planning if the patient needs post-hospital services based on physician/advanced practitioner order or complex needs related to functional status, cognitive ability, or social support system  Outcome: Progressing     Problem: Nutrition/Hydration-ADULT  Goal: Nutrient/Hydration intake appropriate for improving, restoring or maintaining nutritional needs  Description: Monitor and assess patient's nutrition/hydration status for malnutrition. Collaborate with interdisciplinary team and initiate plan and interventions as ordered. Monitor patient's weight and dietary intake as ordered or per policy. Utilize nutrition screening tool and intervene as necessary. Determine patient's food preferences and provide high-protein, high-caloric foods as appropriate.      INTERVENTIONS:  - Monitor oral intake, urinary output, labs, and treatment plans  - Assess nutrition and hydration status and recommend course of action  - Evaluate amount of meals eaten  - Assist patient with eating if necessary   - Allow adequate time for meals  - Recommend/ encourage appropriate diets, oral nutritional supplements, and vitamin/mineral supplements  - Order, calculate, and assess calorie counts as needed  - Recommend, monitor, and adjust tube feedings and TPN/PPN based on assessed needs  - Assess need for intravenous fluids  - Provide specific nutrition/hydration education as appropriate  - Include patient/family/caregiver in decisions related to nutrition  Outcome: Progressing

## 2023-12-02 NOTE — PHYSICAL THERAPY NOTE
PT EVALUATION     Time In: 3637  Time Out: 6043       12/02/23 0935   PT Last Visit   PT Visit Date 12/02/23   Note Type   Note type Evaluation   Pain Assessment   Pain Assessment Tool 0-10   Pain Score No Pain   Hospital Pain Intervention(s) Repositioned; Ambulation/increased activity; Emotional support   Restrictions/Precautions   Other Precautions Chair Alarm; Bed Alarm; Fall Risk   Home Living   Type of [address] [place] [address] to live on main level with bedroom/bathroom; Elevator  (Patient lives on 5th floor)   Bathroom Shower/Tub Tub/shower unit   [address]  (Rollator)   Prior Function   Level of [place] Independent with ADLs; Independent with functional mobility; Independent with IADLS   Lives With Alone   Receives Help From Family;Friend(s)   Falls in the last 6 months 1 to 4   Vocational On disability   General   Additional Pertinent History Pt reports to ED on his own via taxi for alcohol detoxification. Pt reports drinking heavily for 3 weeks and wants to detox, presented to ED without any withdrawal symptoms. Family/Caregiver Present No   Cognition   Overall Cognitive Status WFL   Arousal/Participation Alert   Orientation Level Oriented X4   Memory Within functional limits   Following Commands Follows all commands and directions without difficulty   Subjective   Subjective "I need to get out of here by tomorrow, I have a colonoscopy."   RLE Assessment   RLE Assessment   (4/5 all myotomes tested in sitting)   LLE Assessment   LLE Assessment   (4/5 all myotomes tested in sitting)   Bed Mobility   Additional Comments Pt seated EOB upon PT arrival   Transfers   Sit to Stand 4  Minimal assistance   Additional items Assist x 1; Increased time required   Stand to Sit 4  Minimal assistance   Additional items Assist x 1; Increased time required   Toilet transfer 4  Minimal assistance   Additional items Assist x 1;Standard toilet  (Pt standing to urinate, PT contact guard needed secondary to unsteady and tremor in bilateral LE)   Ambulation/Elevation   Gait pattern Forward Flexion; Wide BIN  (Pt has bilateral LE tremor at rest and with ambulation, unsteady on feet)   Gait Assistance 4  Minimal assist   Additional items Assist x 1   Assistive Device Rolling walker   Distance 20 feet within room with change in direction   Stair Management Assistance Not tested   Balance   Static Sitting Good   Dynamic Sitting Fair +   Static Standing Fair -   Dynamic Standing Poor +   Ambulatory Poor   Activity Tolerance   Activity Tolerance Patient tolerated treatment well   Nurse Made Aware RN Jodee   Assessment   Problem List Decreased strength;Decreased endurance; Impaired balance;Decreased mobility;Pain   Assessment Patient seen for Physical Therapy evaluation. Patient admitted with Alcohol intoxication ([address]). Comorbidities affecting patient's physical performance include: Afib, ENMA, CAD, cancer, COPD, diabetes, GERD, and hypertension. Personal factors affecting patient at time of initial evaluation include: ambulating with assistive device, inability to ambulate household distances, inability to navigate community distances, limited home support, positive fall history, inability to perform ADLS, and inability to perform IADLS . Prior to admission, patient was independent with functional mobility with rollator and RW, independent with ADLS, independent with IADLS, ambulating household distance, and ambulating community distances. Please find objective findings from Physical Therapy assessment regarding body systems outlined above with impairments and limitations including weakness, decreased ROM, impaired balance, decreased endurance, gait deviations, pain, decreased activity tolerance, decreased functional mobility tolerance, and fall risk.   The Barthel Index was used as a functional outcome tool presenting with a score of Barthel Index Score: 55 today indicating moderate limitations of functional mobility and ADLS. Patient's clinical presentation is currently evolving as seen in patient's presentation of changing level of pain, increased fall risk, new onset of impairment of functional mobility, decreased endurance, and new onset of weakness. Pt would benefit from continued Physical Therapy treatment to address deficits as defined above and maximize level of functional mobility. As demonstrated by objective findings, the assigned level of complexity for this evaluation is high. The patient's AM-PAC Basic Mobility Inpatient Short Form Raw Score is 15. A Raw score of less than or equal to 16 suggests the patient may benefit from discharge to post-acute rehabilitation services. Please also refer to the recommendation of the Physical Therapist for safe discharge planning. Patient able to complete toileting and don and doff socks independently. Patient independent with ADL's, OT evaluation deferred at this time. Barriers to Discharge Decreased caregiver support   Goals   STG Expiration Date 12/09/23   Short Term Goal #1 Short-term goals: Pt will improve strength by 1/2 grade in bilateral LE;  Standing balance will improve to "fair" in order to decrease risk of falls; pt will improve all transfers to supervision ; Pt will ambulate with RW 50 feet with min A x 1 assist;   LTG Expiration Date 12/16/23   Long Term Goal #1 Long-term goals: Patient will improve all transfers to supervision demonstrating safe and appropriate technique in order to improve ability to negotiate safely in home environment;  Patient will be able to ambulate with least restrictive AD > 100'x1 with supervision in order to demonstrate ability to negotiate in home environment; Patient will negotiate stairs using most appropriate technique and S in order to be able to negotiate safely in home environment; Patient will improve AMPAC score to 18/24 or better to demonstrate improved functional independence.    Plan Treatment/Interventions ADL retraining;Functional transfer training;LE strengthening/ROM; Therapeutic exercise; Bed mobility;Gait training;Spoke to nursing   PT Frequency   (5x/wk)   Discharge Recommendation   Rehab Resource Intensity Level, PT III (Minimum Resource Intensity)   Equipment Recommended   (rollator- patient reporting he had a rollator, but was misplaced in his building. He reports borrowing RW from his friend currently.)   AM-PAC Basic Mobility Inpatient   Turning in Flat Bed Without Bedrails 3   Lying on Back to Sitting on Edge of Flat Bed Without Bedrails 3   Moving Bed to Chair 3   Standing Up From Chair Using Arms 3   Walk in Room 2   Climb 3-5 Stairs With Railing 1   Basic Mobility Inpatient Raw Score 15   Basic Mobility Standardized Score 36.97   Highest Level Of Mobility   -HLM Goal 4: Move to chair/commode   JH-HLM Achieved 6: Walk 10 steps or more   Barthel Index   Feeding 10   Bathing 0   Grooming Score 0   Dressing Score 10   Bladder Score 10   Bowels Score 10   Toilet Use Score 5   Transfers (Bed/Chair) Score 10   Mobility (Level Surface) Score 0   Stairs Score 0   Barthel Index Score 55   Additional Treatment Session   Start Time 0945   End Time 0955   Treatment Assessment S: "I need to go to the bathroom." O: Pt min A x 1 sit to stand from EOB with RW, able to ambulate min A x 1 with RW from bed to bathroom. Pt standing to urinate, PT contact guard while patient urinating secondary to patient unsteady when standing. Pt min A x 1 ambulation bathroom to EOB. A: Pt tolerating session well overall. Patient ambulating with wide BIN and bilateral LE tremor and PT contact guard needed while patient standing to urinate. P: Patient will benefit from increasing functional mobility with clinical staff and continued physical therapy with progression as tolerated to regain function.    Equipment Use RW   Exercises   Balance training  Ambulation within room to bathroom   End of Consult   Patient Position at End of Consult Seated edge of bed; All needs within reach   [employer] [place] Insurance Number  Rafi Thomas PT, DPT [id number]

## 2023-12-02 NOTE — PROGRESS NOTES
Daily Progress Note - [facility]  Family Medicine Residency  Wilda Draper 66 y.o. male MRN: [id number]  Unit/Bed#: 2 [place] [address] Encounter: [id number]  Admitting Physician: Reyna Huertas MD   PCP: Mary Oliva MD  Date of Admission:  11/30/2023  7:35 PM    Assessment and Plan    * Alcohol intoxication [facility])  Assessment & Plan  Pt states that he has been binge drinking and wants to detox  Pt refused to transfer to [facility] for alcohol rehab  Pt did not have any symptoms of withdrawal POA  Ethanol: 419    -CIWA protocol  -Last CIWA 14 s/p PO ativan 2 mg  -Pt received a total of 5 mg of Ativan  -Will monitor withdrawal symptoms   - ml/hr discontineued  -fall precaution  -start thiamine, multi-vitamin, folic acid   -Restart home MAG-OX 400mg BID    Acute kidney injury ([address])  Assessment & Plan  Cr 1.46 POA   Baseline 1.1    -avoid nephrotoxic medication  - ml/hr, discontinued  -monitor creatinine    COPD (chronic obstructive pulmonary disease) ([address])  Assessment & Plan  Patient has SOB at baseline, not on home oxygen. Currently smoking 1 packs/day.      Continue home inhalers Breo Ellipta  Duo nebs discontinued  Albuterol neb PRN  Ipratropium 0.02%/Atrovent 1.25mg Q6 [address]    GERD (gastroesophageal reflux disease)  Assessment & Plan  Continue home protonix     Prostate cancer [facility])  Assessment & Plan  Hx prostate cancer status postradiation  Continue home Flomax    History of Atrial fibrillation (HCC)  Assessment & Plan  Chronic, rate controlled  Home meds: Eliquis 5 mg BID, Lopressor 25 mg every 12 hrs, Ranexa 500 mg every 12 hrs  EKG on admission showed  NSR, LVH     Plan:  - Continue home Eliquis, Ranexa and Lopressor     Chronic hyponatremia  Assessment & Plan  Chronic in setting of chronic alcohol use  Na 131 POA baseline 129-30   Pt was on sodium tablet 1g QID for 2 weeks at last discharge     -IVF NS 120ml/hr, discontinued  -consider sodium tablet if sodium worsens with IVF NS     Nicotine abuse  Assessment & Plan  Chronic, current smoker   Continue Nicotine patch  Continue home chantix  smoking cessation education    CAD (coronary artery disease)  Assessment & Plan  hx of CAD s/p CABG    -conitinue home ASA, statin, ranolazine      Essential hypertension  Assessment & Plan  Chronic, stable. Home meds: Lisinopril 10 mg QD & Lopressor 25 mg BID. -Continue home Lopressor 25mg BID  -Hold home lisinopril 10mg QD due to ENMA and soft BP    Type 2 diabetes mellitus ([address])  Assessment & Plan  Lab Results   Component Value Date    HGBA1C 6.1 (H) 09/20/2023       No results for input(s): "POCGLU" in the last 72 hours. Blood Sugar Average: Last 72 hrs:    Patient is on home Metformin 500mg daily. Pt is also on gabapentin 300 mg TID for diabetic  neuropathy     -Pt insists he wants to be on regular diet  -On regular diet  -hold home metformin, will start ISS and accucheck ACHS   - Home Gabapentin restarted 100mg TID  -Accu-Glucose check Q6        VTE Pharmacologic Prophylaxis: VTE Score: 5 High Risk (Score >/= 5) - Pharmacological DVT Prophylaxis Ordered: enoxaparin (Lovenox). Sequential Compression Devices Ordered. Patient Centered Rounds: I have performed bedside rounds with nursing staff today. Discussions with Specialists or Other Care Team Provider: PT, OT    Education and Discussions with Family / Patient:   Patient Information Sharing: With the consent of Pretty Gallagher , their loved ones will be notified today by inpatient team of the patient’s condition and current plan. All questions answered. Time Spent for Care: 30 minutes. More than 50% of total time spent on counseling and coordination of care as described above.     Current Length of Stay: 2 day(s)    Current Patient Status: Inpatient   Certification Statement: The patient will continue to require additional inpatient hospital stay due to alcohol withdrawal symptoms and detox    Discharge Plan: Home    Code Status: Level 1 - Full Code    Subjective:   Pt seen and examined at bedside. Overnight the patient received 2mg of Ativan, CIWA 15. Today he complains of little nausea and headache. Pt denies chest pain, sob, vomiting or diarrhea. Last BM was on  but the pt states that he did not eat much since the last 2 days. No other acute complaints at this time. Objective     Objective:   Vitals:   Temp (24hrs), Av.1 °F (37.3 °C), Min:98.4 °F (36.9 °C), Max:100.3 °F (37.9 °C)    Temp:  [98.4 °F (36.9 °C)-100.3 °F (37.9 °C)] 98.4 °F (36.9 °C)  HR:  [] 78  Resp:  [17-20] 19  BP: (126-185)/() 182/97  SpO2:  [84 %-96 %] 94 %  Body mass index is 25.78 kg/m². Input and Output Summary (last 24 hours): Intake/Output Summary (Last 24 hours) at 2023 0935  Last data filed at 2023 0801  Gross per 24 hour   Intake --   Output 600 ml   Net -600 ml       Physical Exam:   Physical Exam  HENT:      Head: Atraumatic. Cardiovascular:      Rate and Rhythm: Normal rate and regular rhythm. Pulses: Normal pulses. Heart sounds: Normal heart sounds. Pulmonary:      Effort: Pulmonary effort is normal.      Breath sounds: Normal breath sounds. Abdominal:      General: Abdomen is flat. Palpations: Abdomen is soft. Musculoskeletal:         General: Normal range of motion. Skin:     General: Skin is warm. Capillary Refill: Capillary refill takes less than 2 seconds. Neurological:      Mental Status: He is alert and oriented to person, place, and time.    Psychiatric:         Mood and Affect: Mood normal.         Behavior: Behavior normal.           Additional Data:     Labs:  Results from last 7 days   Lab Units 23  0503   WBC Thousand/uL 6.89   HEMOGLOBIN g/dL 12.0   HEMATOCRIT % 34.3*   PLATELETS Thousands/uL 103*   NEUTROS PCT % 66   LYMPHS PCT % 18   MONOS PCT % 12   EOS PCT % 2     Results from last 7 days   Lab Units 23  0503   POTASSIUM mmol/L 4.3   CHLORIDE mmol/L 94* CO2 mmol/L 26   BUN mg/dL 31*   CREATININE mg/dL 1.46*   CALCIUM mg/dL 8.7   ALK PHOS U/L 57   ALT U/L 36   AST U/L 32     Results from last 7 days   Lab Units 11/30/23 2011   INR  1.03     Results from last 7 days   Lab Units 12/02/23  0717 12/02/23  0657 12/02/23  0019 12/01/23  2212 12/01/23  1840   POC GLUCOSE mg/dl 113 118 149* 130 133           * I Have Reviewed All Lab Data Listed Above. * Additional Pertinent Lab Tests Reviewed:  [address] Admission Reviewed    Imaging:    Imaging Reports Reviewed Today Include: yes  Imaging Personally Reviewed by Myself Includes:  yes    Recent Cultures (last 7 days):           Last 24 Hours Medication List:   Current Facility-Administered Medications   Medication Dose Route Frequency Provider Last Rate    acetaminophen  650 mg Oral Q6H PRN Belle Oscar MD      albuterol  2.5 mg Nebulization Q6H PRN Belle Oscar MD      apixaban  5 mg Oral BID Meseret Silva MD      aspirin  81 mg Oral Daily Meseret Silva MD      fluticasone-vilanterol  1 puff Inhalation Daily Meseret Silva MD      folic acid  1 mg Oral Daily Meseret Silva MD      gabapentin  100 mg Oral TID Emma Bernheim, MD      insulin lispro  1-6 Units Subcutaneous 4x Daily (AC & HS) Emma Bernheim, MD      levalbuterol  1.25 mg Nebulization Q6H [address] Emma Bernheim, MD      And    ipratropium  0.5 mg Nebulization Q6H [address] Emma Bernheim, MD      labetalol  10 mg Intravenous Q6H PRN Belle Oscar MD      LORazepam  2 mg Oral Q6H PRN Jelly Pugh MD      magnesium Oxide  400 mg Oral BID Emma Bernheim, MD      magnesium sulfate  4 g Intravenous Once Emma Bernheim, MD      metoprolol tartrate  25 mg Oral Q12H Itzel Wahl MD      multivitamin-minerals  1 tablet Oral Daily Meseret Silva MD      nicotine  1 patch Transdermal Daily Belle Oscar MD      ondansetron  4 mg Intravenous Q6H PRN Meseret Silva MD      pantoprazole  40 mg Oral Early Morning Meseret Silva MD      pravastatin  40 mg Oral Daily With Curly Ross MD      ranolazine  500 mg Oral Q12H [facility], MD      tamsulosin  0.4 mg Oral Daily Padmini Daugherty MD      Thiamine Mononitrate  100 mg Oral Daily Padmini Daugherty MD      varenicline  1 mg Oral BID Padmini Daugherty MD               ** Please Note: Dictation voice to text software may have been used in the creation of this document.  Brandy Quinn MD  12/02/23  9:35 AM

## 2023-12-02 NOTE — OCCUPATIONAL THERAPY NOTE
OT EVALUATION       12/02/23 1206   Note Type   Note type Screen   Additional Comments Patient is independent with ADLS per PT, no skilled OT Needs at this time defer to PT for balance and mobility.    Licensure   [place] License Number  [place] [id number] Located within [facility] OTR/L [id number]

## 2023-12-02 NOTE — UTILIZATION REVIEW
Date: 12/2/23    Day 3: Has surpassed a 2nd midnight with active treatments and services, which include electrolyte repletion, CIWA protocol, monitor for s/s withdrawal, monitor labs/lytes, VS.    See initial review completed by Faith Lazo. on 12/1/23.

## 2023-12-03 VITALS
HEART RATE: 80 BPM | BODY MASS INDEX: 25.77 KG/M2 | RESPIRATION RATE: 18 BRPM | HEIGHT: 74 IN | SYSTOLIC BLOOD PRESSURE: 125 MMHG | WEIGHT: 200.8 LBS | OXYGEN SATURATION: 93 % | TEMPERATURE: 98.2 F | DIASTOLIC BLOOD PRESSURE: 68 MMHG

## 2023-12-03 PROBLEM — F10.929 ALCOHOL INTOXICATION (HCC): Status: RESOLVED | Noted: 2019-11-23 | Resolved: 2023-12-03

## 2023-12-03 LAB
ALBUMIN SERPL BCP-MCNC: 3.7 G/DL (ref 3.5–5)
ALP SERPL-CCNC: 59 U/L (ref 34–104)
ALT SERPL W P-5'-P-CCNC: 33 U/L (ref 7–52)
ANION GAP SERPL CALCULATED.3IONS-SCNC: 9 MMOL/L
AST SERPL W P-5'-P-CCNC: 35 U/L (ref 13–39)
BASOPHILS # BLD AUTO: 0.07 THOUSANDS/ÂΜL (ref 0–0.1)
BASOPHILS NFR BLD AUTO: 1 % (ref 0–1)
BILIRUB SERPL-MCNC: 0.66 MG/DL (ref 0.2–1)
BUN SERPL-MCNC: 22 MG/DL (ref 5–25)
CALCIUM SERPL-MCNC: 8.8 MG/DL (ref 8.4–10.2)
CHLORIDE SERPL-SCNC: 93 MMOL/L (ref 96–108)
CO2 SERPL-SCNC: 27 MMOL/L (ref 21–32)
CREAT SERPL-MCNC: 1.32 MG/DL (ref 0.6–1.3)
EOSINOPHIL # BLD AUTO: 0.15 THOUSAND/ÂΜL (ref 0–0.61)
EOSINOPHIL NFR BLD AUTO: 2 % (ref 0–6)
ERYTHROCYTE [DISTWIDTH] IN BLOOD BY AUTOMATED COUNT: 14.2 % (ref 11.6–15.1)
GFR SERPL CREATININE-BSD FRML MDRD: 57 ML/MIN/1.73SQ M
GLUCOSE SERPL-MCNC: 105 MG/DL (ref 65–140)
GLUCOSE SERPL-MCNC: 111 MG/DL (ref 65–140)
GLUCOSE SERPL-MCNC: 128 MG/DL (ref 65–140)
GLUCOSE SERPL-MCNC: 179 MG/DL (ref 65–140)
HCT VFR BLD AUTO: 33.1 % (ref 36.5–49.3)
HGB BLD-MCNC: 11.6 G/DL (ref 12–17)
IMM GRANULOCYTES # BLD AUTO: 0.03 THOUSAND/UL (ref 0–0.2)
IMM GRANULOCYTES NFR BLD AUTO: 1 % (ref 0–2)
LYMPHOCYTES # BLD AUTO: 1.21 THOUSANDS/ÂΜL (ref 0.6–4.47)
LYMPHOCYTES NFR BLD AUTO: 20 % (ref 14–44)
MAGNESIUM SERPL-MCNC: 1.6 MG/DL (ref 1.9–2.7)
MCH RBC QN AUTO: 34.1 PG (ref 26.8–34.3)
MCHC RBC AUTO-ENTMCNC: 35 G/DL (ref 31.4–37.4)
MCV RBC AUTO: 97 FL (ref 82–98)
MONOCYTES # BLD AUTO: 0.73 THOUSAND/ÂΜL (ref 0.17–1.22)
MONOCYTES NFR BLD AUTO: 12 % (ref 4–12)
NEUTROPHILS # BLD AUTO: 4.03 THOUSANDS/ÂΜL (ref 1.85–7.62)
NEUTS SEG NFR BLD AUTO: 64 % (ref 43–75)
NRBC BLD AUTO-RTO: 0 /100 WBCS
PLATELET # BLD AUTO: 93 THOUSANDS/UL (ref 149–390)
PMV BLD AUTO: 11.3 FL (ref 8.9–12.7)
POTASSIUM SERPL-SCNC: 4 MMOL/L (ref 3.5–5.3)
PROT SERPL-MCNC: 6.6 G/DL (ref 6.4–8.4)
RBC # BLD AUTO: 3.4 MILLION/UL (ref 3.88–5.62)
SODIUM SERPL-SCNC: 129 MMOL/L (ref 135–147)
WBC # BLD AUTO: 6.22 THOUSAND/UL (ref 4.31–10.16)

## 2023-12-03 PROCEDURE — 85025 COMPLETE CBC W/AUTO DIFF WBC: CPT

## 2023-12-03 PROCEDURE — 83735 ASSAY OF MAGNESIUM: CPT

## 2023-12-03 PROCEDURE — 99239 HOSP IP/OBS DSCHRG MGMT >30: CPT | Performed by: INTERNAL MEDICINE

## 2023-12-03 PROCEDURE — 82948 REAGENT STRIP/BLOOD GLUCOSE: CPT

## 2023-12-03 PROCEDURE — 80053 COMPREHEN METABOLIC PANEL: CPT

## 2023-12-03 RX ORDER — SODIUM CHLORIDE 1 G/1
1 TABLET ORAL 3 TIMES DAILY
Qty: 30 TABLET | Refills: 1 | Status: SHIPPED | OUTPATIENT
Start: 2023-12-03 | End: 2023-12-03 | Stop reason: SDUPTHER

## 2023-12-03 RX ORDER — SODIUM CHLORIDE 1 G/1
1 TABLET ORAL 3 TIMES DAILY
Qty: 30 TABLET | Refills: 1 | Status: SHIPPED | OUTPATIENT
Start: 2023-12-04 | End: 2023-12-06 | Stop reason: SDUPTHER

## 2023-12-03 RX ORDER — MAGNESIUM SULFATE 1 G/100ML
1 INJECTION INTRAVENOUS ONCE
Status: COMPLETED | OUTPATIENT
Start: 2023-12-03 | End: 2023-12-03

## 2023-12-03 RX ORDER — LEVALBUTEROL INHALATION SOLUTION 1.25 MG/3ML
1.25 SOLUTION RESPIRATORY (INHALATION)
Qty: 30 ML | Refills: 0 | Status: SHIPPED | OUTPATIENT
Start: 2023-12-03 | End: 2023-12-03

## 2023-12-03 RX ORDER — NICOTINE 21 MG/24HR
1 PATCH, TRANSDERMAL 24 HOURS TRANSDERMAL DAILY
Qty: 28 PATCH | Refills: 0 | Status: SHIPPED | OUTPATIENT
Start: 2023-12-04

## 2023-12-03 RX ORDER — SODIUM CHLORIDE 1 G/1
1 TABLET ORAL
Status: DISCONTINUED | OUTPATIENT
Start: 2023-12-03 | End: 2023-12-03 | Stop reason: HOSPADM

## 2023-12-03 RX ORDER — LEVALBUTEROL INHALATION SOLUTION 1.25 MG/3ML
1.25 SOLUTION RESPIRATORY (INHALATION)
Status: DISCONTINUED | OUTPATIENT
Start: 2023-12-03 | End: 2023-12-03

## 2023-12-03 RX ORDER — LEVALBUTEROL INHALATION SOLUTION 1.25 MG/3ML
1.25 SOLUTION RESPIRATORY (INHALATION)
Status: DISCONTINUED | OUTPATIENT
Start: 2023-12-03 | End: 2023-12-03 | Stop reason: HOSPADM

## 2023-12-03 RX ADMIN — METOPROLOL TARTRATE 25 MG: 25 TABLET, FILM COATED ORAL at 08:12

## 2023-12-03 RX ADMIN — Medication 1 TABLET: at 08:12

## 2023-12-03 RX ADMIN — GABAPENTIN 100 MG: 100 CAPSULE ORAL at 08:12

## 2023-12-03 RX ADMIN — NICOTINE 1 PATCH: 21 PATCH, EXTENDED RELEASE TRANSDERMAL at 08:13

## 2023-12-03 RX ADMIN — LORAZEPAM 2 MG: 1 TABLET ORAL at 08:20

## 2023-12-03 RX ADMIN — ASPIRIN 81 MG CHEWABLE TABLET 81 MG: 81 TABLET CHEWABLE at 08:11

## 2023-12-03 RX ADMIN — TAMSULOSIN HYDROCHLORIDE 0.4 MG: 0.4 CAPSULE ORAL at 08:13

## 2023-12-03 RX ADMIN — INSULIN LISPRO 1 UNITS: 100 INJECTION, SOLUTION INTRAVENOUS; SUBCUTANEOUS at 11:16

## 2023-12-03 RX ADMIN — FOLIC ACID 1 MG: 1 TABLET ORAL at 08:11

## 2023-12-03 RX ADMIN — PANTOPRAZOLE SODIUM 40 MG: 40 TABLET, DELAYED RELEASE ORAL at 06:33

## 2023-12-03 RX ADMIN — THIAMINE HCL TAB 100 MG 100 MG: 100 TAB at 08:12

## 2023-12-03 RX ADMIN — MAGNESIUM SULFATE HEPTAHYDRATE 1 G: 1 INJECTION, SOLUTION INTRAVENOUS at 08:12

## 2023-12-03 RX ADMIN — APIXABAN 5 MG: 5 TABLET, FILM COATED ORAL at 08:11

## 2023-12-03 RX ADMIN — Medication 400 MG: at 08:12

## 2023-12-03 RX ADMIN — FLUTICASONE FUROATE AND VILANTEROL TRIFENATATE 1 PUFF: 200; 25 POWDER RESPIRATORY (INHALATION) at 08:12

## 2023-12-03 RX ADMIN — RANOLAZINE 500 MG: 500 TABLET, FILM COATED, EXTENDED RELEASE ORAL at 08:12

## 2023-12-03 RX ADMIN — SODIUM CHLORIDE 1 G: 1 TABLET ORAL at 11:17

## 2023-12-03 NOTE — PLAN OF CARE
Problem: RESPIRATORY - ADULT  Goal: Achieves optimal ventilation and oxygenation  Description: INTERVENTIONS:  - Assess for changes in respiratory status  - Assess for changes in mentation and behavior  - Position to facilitate oxygenation and minimize respiratory effort  - Oxygen administered by appropriate delivery if ordered  - Initiate smoking cessation education as indicated  - Encourage broncho-pulmonary hygiene including cough, deep breathe, Incentive Spirometry  - Assess the need for suctioning and aspirate as needed  - Assess and instruct to report SOB or any respiratory difficulty  - Respiratory Therapy support as indicated  Outcome: Progressing     Problem: PAIN - ADULT  Goal: Verbalizes/displays adequate comfort level or baseline comfort level  Description: Interventions:  - Encourage patient to monitor pain and request assistance  - Assess pain using appropriate pain scale  - Administer analgesics based on type and severity of pain and evaluate response  - Implement non-pharmacological measures as appropriate and evaluate response  - Consider cultural and social influences on pain and pain management  - Notify physician/advanced practitioner if interventions unsuccessful or patient reports new pain  Outcome: Progressing     Problem: INFECTION - ADULT  Goal: Absence or prevention of progression during hospitalization  Description: INTERVENTIONS:  - Assess and monitor for signs and symptoms of infection  - Monitor lab/diagnostic results  - Monitor all insertion sites, i.e. indwelling lines, tubes, and drains  - Monitor endotracheal if appropriate and nasal secretions for changes in amount and color  - [place] appropriate cooling/warming therapies per order  - Administer medications as ordered  - Instruct and encourage patient and family to use good hand hygiene technique  - Identify and instruct in appropriate isolation precautions for identified infection/condition  Outcome: Progressing  Goal: Absence of fever/infection during neutropenic period  Description: INTERVENTIONS:  - Monitor WBC    Outcome: Progressing     Problem: SAFETY ADULT  Goal: Patient will remain free of falls  Description: INTERVENTIONS:  - Educate patient/family on patient safety including physical limitations  - Instruct patient to call for assistance with activity   - Consult OT/PT to assist with strengthening/mobility   - Keep Call bell within reach  - Keep bed low and locked with side rails adjusted as appropriate  - Keep care items and personal belongings within reach  - Initiate and maintain comfort rounds  - Make Fall Risk Sign visible to staff  - Offer Toileting every  Hours, in advance of need  - Initiate/Maintain alarm  - Obtain necessary fall risk management equipment:   - Apply yellow socks and bracelet for high fall risk patients  - Consider moving patient to room near nurses station  Outcome: Progressing  Goal: Maintain or return to baseline ADL function  Description: INTERVENTIONS:  -  Assess patient's ability to carry out ADLs; assess patient's baseline for ADL function and identify physical deficits which impact ability to perform ADLs (bathing, care of mouth/teeth, toileting, grooming, dressing, etc.)  - Assess/evaluate cause of self-care deficits   - Assess range of motion  - Assess patient's mobility; develop plan if impaired  - Assess patient's need for assistive devices and provide as appropriate  - Encourage maximum independence but intervene and supervise when necessary  - Involve family in performance of ADLs  - Assess for home care needs following discharge   - Consider OT consult to assist with ADL evaluation and planning for discharge  - Provide patient education as appropriate  Outcome: Progressing  Goal: Maintains/Returns to pre admission functional level  Description: INTERVENTIONS:  - Perform AM-PAC 6 Click Basic Mobility/ Daily Activity assessment daily.  - Set and communicate daily mobility goal to care team and patient/family/caregiver. - Collaborate with rehabilitation services on mobility goals if consulted  - Perform Range of Motion  times a day. - Reposition patient every  hours. - Dangle patient  times a day  - Stand patient  times a day  - Ambulate patient  times a day  - Out of bed to chair  times a day   - Out of bed for meals  times a day  - Out of bed for toileting  - Record patient progress and toleration of activity level   Outcome: Progressing     Problem: DISCHARGE PLANNING  Goal: Discharge to home or other facility with appropriate resources  Description: INTERVENTIONS:  - Identify barriers to discharge w/patient and caregiver  - Arrange for needed discharge resources and transportation as appropriate  - Identify discharge learning needs (meds, wound care, etc.)  - Arrange for interpretive services to assist at discharge as needed  - Refer to Case Management Department for coordinating discharge planning if the patient needs post-hospital services based on physician/advanced practitioner order or complex needs related to functional status, cognitive ability, or social support system  Outcome: Progressing     Problem: Nutrition/Hydration-ADULT  Goal: Nutrient/Hydration intake appropriate for improving, restoring or maintaining nutritional needs  Description: Monitor and assess patient's nutrition/hydration status for malnutrition. Collaborate with interdisciplinary team and initiate plan and interventions as ordered. Monitor patient's weight and dietary intake as ordered or per policy. Utilize nutrition screening tool and intervene as necessary. Determine patient's food preferences and provide high-protein, high-caloric foods as appropriate.      INTERVENTIONS:  - Monitor oral intake, urinary output, labs, and treatment plans  - Assess nutrition and hydration status and recommend course of action  - Evaluate amount of meals eaten  - Assist patient with eating if necessary   - Allow adequate time for meals  - Recommend/ encourage appropriate diets, oral nutritional supplements, and vitamin/mineral supplements  - Order, calculate, and assess calorie counts as needed  - Recommend, monitor, and adjust tube feedings and TPN/PPN based on assessed needs  - Assess need for intravenous fluids  - Provide specific nutrition/hydration education as appropriate  - Include patient/family/caregiver in decisions related to nutrition  Outcome: Progressing

## 2023-12-03 NOTE — PLAN OF CARE
Problem: RESPIRATORY - ADULT  Goal: Achieves optimal ventilation and oxygenation  Description: INTERVENTIONS:  - Assess for changes in respiratory status  - Assess for changes in mentation and behavior  - Position to facilitate oxygenation and minimize respiratory effort  - Oxygen administered by appropriate delivery if ordered  - Initiate smoking cessation education as indicated  - Encourage broncho-pulmonary hygiene including cough, deep breathe, Incentive Spirometry  - Assess the need for suctioning and aspirate as needed  - Assess and instruct to report SOB or any respiratory difficulty  - Respiratory Therapy support as indicated  Outcome: Progressing     Problem: PAIN - ADULT  Goal: Verbalizes/displays adequate comfort level or baseline comfort level  Description: Interventions:  - Encourage patient to monitor pain and request assistance  - Assess pain using appropriate pain scale  - Administer analgesics based on type and severity of pain and evaluate response  - Implement non-pharmacological measures as appropriate and evaluate response  - Consider cultural and social influences on pain and pain management  - Notify physician/advanced practitioner if interventions unsuccessful or patient reports new pain  Outcome: Progressing     Problem: PAIN - ADULT  Goal: Verbalizes/displays adequate comfort level or baseline comfort level  Description: Interventions:  - Encourage patient to monitor pain and request assistance  - Assess pain using appropriate pain scale  - Administer analgesics based on type and severity of pain and evaluate response  - Implement non-pharmacological measures as appropriate and evaluate response  - Consider cultural and social influences on pain and pain management  - Notify physician/advanced practitioner if interventions unsuccessful or patient reports new pain  Outcome: Progressing     Problem: INFECTION - ADULT  Goal: Absence or prevention of progression during hospitalization  Description: INTERVENTIONS:  - Assess and monitor for signs and symptoms of infection  - Monitor lab/diagnostic results  - Monitor all insertion sites, i.e. indwelling lines, tubes, and drains  - Monitor endotracheal if appropriate and nasal secretions for changes in amount and color  - [place] appropriate cooling/warming therapies per order  - Administer medications as ordered  - Instruct and encourage patient and family to use good hand hygiene technique  - Identify and instruct in appropriate isolation precautions for identified infection/condition  Outcome: Progressing     Problem: INFECTION - ADULT  Goal: Absence of fever/infection during neutropenic period  Description: INTERVENTIONS:  - Monitor WBC    Outcome: Progressing     Problem: SAFETY ADULT  Goal: Maintain or return to baseline ADL function  Description: INTERVENTIONS:  -  Assess patient's ability to carry out ADLs; assess patient's baseline for ADL function and identify physical deficits which impact ability to perform ADLs (bathing, care of mouth/teeth, toileting, grooming, dressing, etc.)  - Assess/evaluate cause of self-care deficits   - Assess range of motion  - Assess patient's mobility; develop plan if impaired  - Assess patient's need for assistive devices and provide as appropriate  - Encourage maximum independence but intervene and supervise when necessary  - Involve family in performance of ADLs  - Assess for home care needs following discharge   - Consider OT consult to assist with ADL evaluation and planning for discharge  - Provide patient education as appropriate  Outcome: Progressing     Problem: SAFETY ADULT  Goal: Maintains/Returns to pre admission functional level  Description: INTERVENTIONS:  - Perform AM-PAC 6 Click Basic Mobility/ Daily Activity assessment daily.  - Set and communicate daily mobility goal to care team and patient/family/caregiver.    - Collaborate with rehabilitation services on mobility goals if consulted  - Perform Range of Motion 2 times a day. - Reposition patient every 2 hours.   - Dangle patient 2 times a day  - Stand patient 2 times a day  - Ambulate patient 2 times a day  - Out of bed to chair 2 times a day   - Out of bed for meals 3 times a day  - Out of bed for toileting  - Record patient progress and toleration of activity level   Outcome: Progressing

## 2023-12-03 NOTE — CASE MANAGEMENT
Case Management Discharge Planning Note    Patient name Jerry Holt  Location [facility] [place] [address] MRN [id number]  : 1960 Date 12/3/2023       Current Admission Date: 2023  Current Admission Diagnosis:Alcohol intoxication [facility])   Patient Active Problem List    Diagnosis Date Noted    Melena 10/29/2023    Abnormal thyroid stimulating hormone level 2023    Chest pain 2023    Paroxysmal atrial fibrillation with rapid ventricular response ([address]) 2023    GERD (gastroesophageal reflux disease) 2023    Sleep apnea 2023    Stable angina 2022    Stage 3a chronic kidney disease ([address]) 2022    Fatty liver, alcoholic     Nonischemic nontraumatic myocardial injury 04/10/2022    History of atrial fibrillation 10/25/2021    Poor historian 10/24/2021    Mild protein-calorie malnutrition ([address]) 2021    Prostate cancer ([address]) 2021    History of Atrial fibrillation ([address]) 2020    Encounter for smoking cessation counseling 2020    Chronic heart failure with preserved ejection fraction ([address]) 2019    Alcohol intoxication ([address]) 2019    Closed fracture of multiple ribs of right side 2019    Acute kidney injury ([address])     Alcohol abuse 10/17/2019    Chronic hyponatremia 10/17/2019    Cervical spinal stenosis 10/17/2019    Thrombocytopenia ([address]) 2019    History of prostate cancer 2019    CAD (coronary artery disease) 2019    Nicotine abuse 2019    Hypomagnesemia 10/10/2018    Depression, recurrent ([address]) 10/10/2018    Hx of CABG 10/10/2018    Dyslipidemia 10/10/2018    COPD (chronic obstructive pulmonary disease) ([address]) 10/09/2018    Type 2 diabetes mellitus ([address]) 10/09/2018    Essential hypertension 10/09/2018      LOS (days): 3  Geometric Mean LOS (GMLOS) (days): 3.10  Days to GMLOS:0.4     OBJECTIVE:  Risk of Unplanned Readmission Score: 41.46         Current admission status: Inpatient   Preferred Pharmacy:   [address] [facility]  Phone: [phone] Fax: [phone]    Primary Care Provider: Rajeev Blanco MD    Primary Insurance: [facility]  Secondary Insurance:     DISCHARGE DETAILS:    Other Referral/Resources/Interventions Provided:  Interventions: DME  Referral Comments: CM requested walker in [place]. CM ordered walker in [place]. Walker to be delivered by nursing to patient bedside.  Nursing supervisor made aware via TT.

## 2023-12-03 NOTE — PROGRESS NOTES
Discharge Summary - [address] [place] [address] Family Medicine Residency     Patient Information: Ar Lindquist 66 y.o. male MRN: [id number]  Unit/Bed#: [address] [place] [address] Encounter: [id number]     Admitting Physician: Barbara Altamirano MD  Discharging Physician/Practitioner: Barbara Altamirano MD   PCP: Melonie Jacinto MD  Admission Date:   Admission Orders (From admission, onward)       Ordered        11/30/23 2150  INPATIENT ADMISSION  Once                          Discharge Date: 12/03/23      Reason for Admission: Alcohol abuse [F10.10]  Hypotension [I95.9]  S/P alcohol detoxification [Z92.89]  ENMA (acute kidney injury) ([address]) [G32.3]  Alcoholic intoxication with complication [facility]) [H28.072]     Discharge Diagnoses:      Principal Problem (Resolved):    Alcohol intoxication ([address])  Active Problems:    Chronic hyponatremia    COPD (chronic obstructive pulmonary disease) ([address])    CAD (coronary artery disease)    Type 2 diabetes mellitus ([address])    Essential hypertension    Nicotine abuse    History of Atrial fibrillation ([address])    Prostate cancer ([address])    GERD (gastroesophageal reflux disease)  Resolved Problems:    Acute kidney injury ([address])       Consultations During Hospital Stay:  ·       None     Procedures Performed:   ·       None     Significant Findings / Test Results:   12/3: Na 129, Cr 1.32  12/2: Na 131, Cr 1.46  12/1: Na 131, Cr 2.45, Trops WNL  11/30: Na 129, BUN 30, Cr 2.72, EtOH 419    - EKG: NSR Nonspecific ST abnormality  - CT abd/pelvis wo contrast: No acute abnormality. Hepatic steatosis. Colonic diverticulosis. Subacute right posterior 10th and 11th rib fractures. Incidental Findings:   ·       None      Test Results Pending at Discharge (will require follow up):    ·       Repeat BMP in 1 week     Outpatient Tests Requested:  ·       None     Outpatient follow-up Requested:  ·       PCP, Nephrology    Complications: None    Things to address at first visit after hospitalization   Is the patient taking salt tablets 3 times a day after discharge? Is the patient  taking naltrexone? Did the patient get his BMP labs done within 1 week of discharge to check his sodium levels? Has patient completely cut down alcohol consumption? Is the patient following up with outpatient alcohol program from [facility]? Is the patient compliant with his medications? Hospital Course:     Nitesh Rosenberg is a [address] y.o. male patient 11/30/2023 with history of alcohol abuse, CAD, A-fib on anticoagulation, diabetes mellitus type 2, hypertension, COPD, GERD, nicotine dependence, hyponatremia who presented due to alcohol intoxication requesting detox. He had been drinking a quart of vodka on a daily basis and realizes alcohol was used to since getting out of control. Patient reports that he has not been following with outpatient alcohol program or taking naltrexone. Symptoms on admission include headache & nausea. In the ED alcohol level was 419. Patient was placed on CIWA protocol and thiamine, folic acid and multivitamins were given. Patient's blood pressure was initially low on admission and then normalized and patient was started on Lopressor. Lisinopril was continued due to patient's ENMA plan to start lisinopril on discharge. Sodium level was 129 on admission and improved to 131 after giving him IV fluids NS 100ml/hr. Patient's sodium level on 12/3/2023 down to 129 again and patient was started on salt tablets 1 g 3 times daily. Patient had no withdrawal symptoms and was stable on discharge. Chronic hyponatremia  Assessment & Plan  Due to on chronic in the setting of chronic alcohol use, volume depletion, poor oral intake and possible SIADH. Pt was on sodium tablet 1g QID for 2 weeks at last discharge. Na 31 POA baseline 129-30. After starting patient on IV fluids NS 120ml/hr, sodium levels improved to 131. IV Fluids were then discontinued.  Na on 12/3/2023 dropped to 129      Plan  Patient to be discharged on sodium chloride tablets 1 g 3 times a day for 2 weeks  Discussed with patient to repeat BMP at of this week  Continue fluid restriction  Follow-up with PCP after discharge    * Alcohol intoxication (HCC)-resolved as of 12/3/2023  Assessment & Plan  Pt states that he has been binge drinking and wants to detox  Pt refused to transfer to [facility] for alcohol rehab  Pt did not have any symptoms of withdrawal POA  Ethanol: 419  He was placed on CIWA protocol and received a total of 5 mg of Ativan. Also started on thiamine, multivitamin, folic acid patient withdrawal symptoms and was stable at discharge    Plan  Continue to take thiamine, folate and multivitamins   Patient to take naltrexone that prescribed during previous hospitalization  Restart home MAG-OX 400mg BID  [facility]. Follow-up with PCP after discharge    COPD (chronic obstructive pulmonary disease) ([address])  Assessment & Plan  Patient has SOB at baseline, not on home oxygen. Currently smoking 1 packs/day. Pt was on Xopenex nebulizer and Atrovent during hospitalization    Plan  Continue home inhalers Breo Ellipta  Continue with nicotine patch   Continue chantix    CAD (coronary artery disease)  Assessment & Plan  Patient with hx of CAD s/p CABG on Eliquis 5 mg BID at home. Inpatient labs showed thrombocytopenia in the setting of heavy alcohol abuse. Platelet count continues to be low, with stable hemoglobin and no recurrence of bleeding or melena.   Continue with Eliquis  Continue home ASA, statin, ranolazine      GERD (gastroesophageal reflux disease)  Assessment & Plan  Continue home protonix     Prostate cancer [facility])  Assessment & Plan  Hx prostate cancer status postradiation  Continue home Flomax    History of Atrial fibrillation (HCC)  Assessment & Plan  Chronic, rate controlled  Home meds: Eliquis 5 mg BID, Lopressor 25 mg every 12 hrs, Ranexa 500 mg every 12 hrs  EKG on admission showed  NSR, LVH Plan:  Continue home Eliquis, Ranexa and Lopressor     Nicotine abuse  Assessment & Plan  Chronic, current smoker   Continue Nicotine patch  Continue home chantix  smoking cessation education    Essential hypertension  Assessment & Plan  Chronic, stable. Meds include lisinopril 10 mg daily and & Lopressor 25 mg BID. Lisinopril was held in the setting of ENMA and soft BP. Creatinine trending down and is 1.32 on discharge. Plan  Continue home Lopressor . Continue with lisinopril 10 mg daily    Type 2 diabetes mellitus [facility])  Assessment & Plan  Lab Results   Component Value Date    HGBA1C 6.1 (H) 09/20/2023       Recent Labs     12/02/23 2059 12/03/23  0003 12/03/23  0656 12/03/23  1114   POCGLU 140 111 128 179*       Adequate glycemic control with ISS and regular diet. Continue home Metformin 500mg PO QD with breakfast  Continue with gabapentin milligrams 3 times daily for diabetic neuropathy    Acute kidney injury (HCC)-resolved as of 12/3/2023  Assessment & Plan  Cr 1.46 POA. Baseline 1.1. Patient started on IV fluids 125 ml/hour which was then discontinued. Lisinopril held in the setting of ENMA and soft BP    Plan  Restart lisinopril 10 mg daily  Repeat BMP in 1 week after discharge       Condition at Discharge: good      Discharge Day Visit / Exam:      Vitals: Blood Pressure: 125/68 (12/03/23 0800)  Pulse: 80 (12/03/23 0800)  Temperature: 98.2 °F (36.8 °C) (12/03/23 0800)  Temp Source: Oral (12/03/23 0800)  Respirations: 18 (12/03/23 0800)  Height: 6' 2" (188 cm) (11/30/23 2333)  Weight - Scale: 91.1 kg (200 lb 12.8 oz) (11/30/23 2333)  SpO2: 93 % (12/03/23 0900)  Exam:   Physical Exam  Constitutional:       General: He is not in acute distress. Appearance: Normal appearance. He is not ill-appearing, toxic-appearing or diaphoretic. Neck:      Vascular: No carotid bruit. Cardiovascular:      Rate and Rhythm: Normal rate. Rhythm irregular. Pulses: Normal pulses.       Heart sounds: Normal heart sounds. No murmur heard. No friction rub. No gallop. Pulmonary:      Effort: Pulmonary effort is normal. No respiratory distress. Breath sounds: No stridor. No wheezing, rhonchi or rales. Chest:      Chest wall: No tenderness. Abdominal:      General: Bowel sounds are normal. There is no distension. Palpations: Abdomen is soft. There is no mass. Tenderness: There is no abdominal tenderness. There is no right CVA tenderness, left CVA tenderness, guarding or rebound. Hernia: No hernia is present. Musculoskeletal:         General: No swelling, tenderness, deformity or signs of injury. Normal range of motion. Cervical back: Normal range of motion. No rigidity or tenderness. Right lower leg: No edema. Left lower leg: No edema. Lymphadenopathy:      Cervical: No cervical adenopathy. Skin:     General: Skin is warm. Capillary Refill: Capillary refill takes less than 2 seconds. Coloration: Skin is not jaundiced or pale. Findings: No bruising, erythema, lesion or rash. Neurological:      General: No focal deficit present. Mental Status: He is alert and oriented to person, place, and time. Mental status is at baseline. Comments: Very mild tremors on examination   Psychiatric:         Mood and Affect: Mood normal.            Discussion with Family: Yes  Patient Information Sharing:  Clara Levin was notified today by inpatient team of the patient’s condition and current plan. Discharge instructions/Information to patient and family:   See after visit summary for information provided to patient and family.        Discharge Medications:     Medication List      START taking these medications     levalbuterol 1.25 mg/3 mL nebulizer solution; Commonly known as:   XOPENEX; Take 3 mL (1.25 mg total) by nebulization 3 (three) times a day     CHANGE how you take these medications     nicotine 21 mg/24 hr TD 24 hr patch; Commonly known as: NICODERM CQ;   Place 1 patch on the skin over 24 hours daily Do not start before December 4, 2023.; Start taking on: December 4, 2023; What changed: when to take   this   sodium chloride 1 g tablet; Take 1 tablet (1 g total) by mouth 3 (three)   times a day; What changed: how much to take, when to take this     CONTINUE taking these medications     albuterol 90 mcg/act inhaler; Commonly known as: Ventolin HFA; Inhale 2   puffs every 4 (four) hours as needed for wheezing   apixaban 5 mg; Commonly known as: ELIQUIS; Take 1 tablet (5 mg total) by   mouth 2 (two) times a day   aspirin 81 mg EC tablet; Commonly known as: ECOTRIN LOW STRENGTH   Breo Ellipta 200-25 mcg/actuation inhaler; Generic drug:   fluticasone-vilanterol; Inhale 1 puff daily Rinse mouth after use. ferrous sulfate 325 (65 Fe) mg tablet; Take 1 tablet (325 mg total) by   mouth daily with breakfast   folic acid 1 mg tablet; Commonly known as: FOLVITE; TAKE 1 TABLET DAILY   gabapentin 300 mg capsule; Commonly known as: NEURONTIN; TAKE ONE   CAPSULE THREE TIMES DAILY   lisinopril 20 mg tablet; Commonly known as: ZESTRIL; Take 0.5 tablets   (10 mg total) by mouth daily   magnesium Oxide 400 mg Tabs; Commonly known as: MAG-OX; TAKE 1 TABLET   TWO TIMES A DAY   metFORMIN 500 mg tablet; Commonly known as: GLUCOPHAGE; Take 1 tablet   (500 mg total) by mouth daily with breakfast Do not start before September 23, 2023. metoprolol tartrate 25 mg tablet; Commonly known as: LOPRESSOR; Take 1   tablet (25 mg total) by mouth every 12 (twelve) hours   naltrexone 50 mg tablet; Commonly known as: REVIA;  Take 1 tablet (50 mg   total) by mouth daily   ONE TOUCH ULTRA MINI w/Device Kit   OneTouch Delica Lancets Fine Misc   pantoprazole 40 mg tablet; Commonly known as: PROTONIX; Take 1 tablet   (40 mg total) by mouth 2 (two) times a day   polyethylene glycol 4000 mL solution; Commonly known as: COLYTE; Take   4,000 mL by mouth once for 1 dose Take 4000 mL by mouth once for 1 dose. Use as directed   pravastatin 40 mg tablet; Commonly known as: PRAVACHOL; TAKE 1 TABLET   DAILY WITH DINNER   ranolazine 500 mg 12 hr tablet; Commonly known as: RANEXA; TAKE 1 TABLET   EVERY 12 HOURS   tamsulosin 0.4 mg; Commonly known as: FLOMAX; TAKE 1 CAPSULE DAILY   varenicline 1 mg tablet; Commonly known as: CHANTIX; TAKE 1 TABLET 2   TIMES A DAY   vitamin B-1 100 MG Tabs; TAKE 1 TABLET DAILY     STOP taking these medications     lidocaine 5 %; Commonly known as: LIDODERM        Disposition:   Home     For Discharges to [facility] SNF:   ·       Not Applicable to this Patient - Not Applicable to this Patient     Discharge Statement:  I spent 20 minutes discharging the patient. This time was spent on the day of discharge. I had direct contact with the patient on the day of discharge. Greater than 50% of the total time was spent examining patient, answering all patient questions, arranging and discussing plan of care with patient as well as directly providing post-discharge instructions. Additional time then spent on discharge activities.      ** Please Note: This note has been constructed using a voice recognition system **     Austen Urrutia MD   12/03/23  2:46 PM

## 2023-12-03 NOTE — CASE MANAGEMENT
Case Management Progress Note    Patient name Lamonte Kent  Location [address] [place] [address] [address] MRN [id number]  : 1960 Date 12/3/2023       LOS (days): 3  Geometric Mean LOS (GMLOS) (days): 3.10  Days to GMLOS:0.4        OBJECTIVE:        Current admission status: Inpatient  Preferred Pharmacy:   [address] [place] [address]  Phone: [phone] Fax: [phone]    Primary Care Provider: Chuck Ojeda MD    Primary Insurance: [facility]  Secondary Insurance:     PROGRESS NOTE:      CM was made aware patient needs a walker at discharge. CM contacted nursing supervisor to request walker to be given to patient at discharge.  CM made physician aware that nursing supervisor will provide walker to patient

## 2023-12-03 NOTE — PLAN OF CARE
Problem: RESPIRATORY - ADULT  Goal: Achieves optimal ventilation and oxygenation  Description: INTERVENTIONS:  - Assess for changes in respiratory status  - Assess for changes in mentation and behavior  - Position to facilitate oxygenation and minimize respiratory effort  - Oxygen administered by appropriate delivery if ordered  - Initiate smoking cessation education as indicated  - Encourage broncho-pulmonary hygiene including cough, deep breathe, Incentive Spirometry  - Assess the need for suctioning and aspirate as needed  - Assess and instruct to report SOB or any respiratory difficulty  - Respiratory Therapy support as indicated  12/3/2023 1508 by Garth Hoang RN  Outcome: Completed  12/3/2023 0820 by Garth Hoang RN  Outcome: Progressing     Problem: PAIN - ADULT  Goal: Verbalizes/displays adequate comfort level or baseline comfort level  Description: Interventions:  - Encourage patient to monitor pain and request assistance  - Assess pain using appropriate pain scale  - Administer analgesics based on type and severity of pain and evaluate response  - Implement non-pharmacological measures as appropriate and evaluate response  - Consider cultural and social influences on pain and pain management  - Notify physician/advanced practitioner if interventions unsuccessful or patient reports new pain  12/3/2023 1508 by Garth Hoang RN  Outcome: Completed  12/3/2023 0820 by Garth Hoang RN  Outcome: Progressing     Problem: INFECTION - ADULT  Goal: Absence or prevention of progression during hospitalization  Description: INTERVENTIONS:  - Assess and monitor for signs and symptoms of infection  - Monitor lab/diagnostic results  - Monitor all insertion sites, i.e. indwelling lines, tubes, and drains  - Monitor endotracheal if appropriate and nasal secretions for changes in amount and color  - [place] appropriate cooling/warming therapies per order  - Administer medications as ordered  - Instruct and encourage patient and family to use good hand hygiene technique  - Identify and instruct in appropriate isolation precautions for identified infection/condition  12/3/2023 1508 by Santos Levy RN  Outcome: Completed  12/3/2023 0820 by Santos Levy RN  Outcome: Progressing  Goal: Absence of fever/infection during neutropenic period  Description: INTERVENTIONS:  - Monitor WBC    12/3/2023 1508 by Santos Levy RN  Outcome: Completed  12/3/2023 0820 by Santos Levy RN  Outcome: Progressing     Problem: SAFETY ADULT  Goal: Patient will remain free of falls  Description: INTERVENTIONS:  - Educate patient/family on patient safety including physical limitations  - Instruct patient to call for assistance with activity   - Consult OT/PT to assist with strengthening/mobility   - Keep Call bell within reach  - Keep bed low and locked with side rails adjusted as appropriate  - Keep care items and personal belongings within reach  - Initiate and maintain comfort rounds  - Make Fall Risk Sign visible to staff  - Offer Toileting every  Hours, in advance of need  - Initiate/Maintain alarm  - Obtain necessary fall risk management equipment:   - Apply yellow socks and bracelet for high fall risk patients  - Consider moving patient to room near nurses station  12/3/2023 1508 by Santos Levy RN  Outcome: Completed  12/3/2023 0820 by Santos Levy RN  Outcome: Progressing  Goal: Maintain or return to baseline ADL function  Description: INTERVENTIONS:  -  Assess patient's ability to carry out ADLs; assess patient's baseline for ADL function and identify physical deficits which impact ability to perform ADLs (bathing, care of mouth/teeth, toileting, grooming, dressing, etc.)  - Assess/evaluate cause of self-care deficits   - Assess range of motion  - Assess patient's mobility; develop plan if impaired  - Assess patient's need for assistive devices and provide as appropriate  - Encourage maximum independence but intervene and supervise when necessary  - Involve family in performance of ADLs  - Assess for home care needs following discharge   - Consider OT consult to assist with ADL evaluation and planning for discharge  - Provide patient education as appropriate  12/3/2023 1508 by Nahomi Flaherty RN  Outcome: Completed  12/3/2023 0820 by Nahomi Flaherty RN  Outcome: Progressing  Goal: Maintains/Returns to pre admission functional level  Description: INTERVENTIONS:  - Perform AM-PAC 6 Click Basic Mobility/ Daily Activity assessment daily.  - Set and communicate daily mobility goal to care team and patient/family/caregiver. - Collaborate with rehabilitation services on mobility goals if consulted  - Perform Range of Motion  times a day. - Reposition patient every  hours.   - Dangle patient  times a day  - Stand patient  times a day  - Ambulate patient  times a day  - Out of bed to chair  times a day   - Out of bed for meals times a day  - Out of bed for toileting  - Record patient progress and toleration of activity level   12/3/2023 1508 by Nahomi Flaherty RN  Outcome: Completed  12/3/2023 0820 by Nahomi Flaherty RN  Outcome: Progressing     Problem: DISCHARGE PLANNING  Goal: Discharge to home or other facility with appropriate resources  Description: INTERVENTIONS:  - Identify barriers to discharge w/patient and caregiver  - Arrange for needed discharge resources and transportation as appropriate  - Identify discharge learning needs (meds, wound care, etc.)  - Arrange for interpretive services to assist at discharge as needed  - Refer to Case Management Department for coordinating discharge planning if the patient needs post-hospital services based on physician/advanced practitioner order or complex needs related to functional status, cognitive ability, or social support system  12/3/2023 1508 by Nahomi Flaherty RN  Outcome: Completed  12/3/2023 0820 by Nahomi Flaherty RN  Outcome: Progressing     Problem: Nutrition/Hydration-ADULT  Goal: Nutrient/Hydration intake appropriate for improving, restoring or maintaining nutritional needs  Description: Monitor and assess patient's nutrition/hydration status for malnutrition. Collaborate with interdisciplinary team and initiate plan and interventions as ordered. Monitor patient's weight and dietary intake as ordered or per policy. Utilize nutrition screening tool and intervene as necessary. Determine patient's food preferences and provide high-protein, high-caloric foods as appropriate.      INTERVENTIONS:  - Monitor oral intake, urinary output, labs, and treatment plans  - Assess nutrition and hydration status and recommend course of action  - Evaluate amount of meals eaten  - Assist patient with eating if necessary   - Allow adequate time for meals  - Recommend/ encourage appropriate diets, oral nutritional supplements, and vitamin/mineral supplements  - Order, calculate, and assess calorie counts as needed  - Recommend, monitor, and adjust tube feedings and TPN/PPN based on assessed needs  - Assess need for intravenous fluids  - Provide specific nutrition/hydration education as appropriate  - Include patient/family/caregiver in decisions related to nutrition  12/3/2023 1508 by Vicente Finn RN  Outcome: Completed  12/3/2023 0820 by Vicente Finn RN  Outcome: Progressing

## 2023-12-03 NOTE — NURSING NOTE
Pt discharged to home with lyft ride. [employer]. Pt signed the waiver form. All belongings with pt. IV removed. Pt showered prior to discharge. All discharge instructions gone over with pt understands all instructions. Instructed to ho to [address] to  medications. Walked to the lobby to get his lyft.  With a volunteer

## 2023-12-04 ENCOUNTER — TRANSITIONAL CARE MANAGEMENT (OUTPATIENT)
Dept: FAMILY MEDICINE CLINIC | Facility: CLINIC | Age: 63
End: 2023-12-04

## 2023-12-04 LAB
DME PARACHUTE DELIVERY DATE ACTUAL: NORMAL
DME PARACHUTE DELIVERY DATE REQUESTED: NORMAL
DME PARACHUTE DELIVERY NOTE: NORMAL
DME PARACHUTE ITEM DESCRIPTION: NORMAL
DME PARACHUTE ORDER STATUS: NORMAL
DME PARACHUTE SUPPLIER NAME: NORMAL
DME PARACHUTE SUPPLIER PHONE: NORMAL

## 2023-12-04 NOTE — UTILIZATION REVIEW
NOTIFICATION OF ADMISSION DISCHARGE   This is a Notification of Discharge from [facility]. Please be advised that this patient has been discharge from our facility. Below you will find the admission and discharge date and time including the patient’s disposition. UTILIZATION REVIEW CONTACT:  Abbe Alvarado  Utilization   Network Utilization Review Department  Phone: [phone] x carefully listen to the prompts. All voicemails are confidential.  Email: [email or web address]     ADMISSION INFORMATION  PRESENTATION DATE: 11/30/2023  7:35 PM  OBERVATION ADMISSION DATE:   INPATIENT ADMISSION DATE: 11/30/23  9:50 PM   DISCHARGE DATE: 12/3/2023  4:12 PM   DISPOSITION:Home/Self Care    Network Utilization Review Department  ATTENTION: Please call with any questions or concerns to [phone] and carefully listen to the prompts so that you are directed to the right person. All voicemails are confidential.   For Discharge needs, contact Care Management DC Support Team at [phone] opt. 2  Send all requests for admission clinical reviews, approved or denied determinations and any other requests to dedicated fax number below belonging to the campus where the patient is receiving treatment.  List of dedicated fax numbers for the Facilities:  [place] DENIALS (Administrative/Medical Necessity) [phone]   DISCHARGE SUPPORT TEAM (Network) [phone] [address] [facility] (Maternity/NICU/Pediatrics) [phone]   [address] [facility] [address] [phone] [address] [facility] [phone]   [address] [place] [address]  [address]  [phone]

## 2023-12-05 ENCOUNTER — TELEPHONE (OUTPATIENT)
Age: 63
End: 2023-12-05

## 2023-12-05 NOTE — TELEPHONE ENCOUNTER
Reviewed Golytely prep instruction with pt., advised pt. To mix Golytely with apple juice and that he can take his medications the day before procedure, pt.  Verbalized understanding

## 2023-12-05 NOTE — TELEPHONE ENCOUNTER
Patients GI provider:  Dr. Gracie Cassidy, [address] [facility]    Number to return call: [phone]    Reason for call: Pt calling w/questions regarding the prep. Transferred to [facility] in triage for further assistance.     Scheduled procedure/appointment date if applicable: Procedure 38/78/0328

## 2023-12-06 ENCOUNTER — APPOINTMENT (OUTPATIENT)
Dept: LAB | Facility: HOSPITAL | Age: 63
End: 2023-12-06
Payer: COMMERCIAL

## 2023-12-06 ENCOUNTER — TELEPHONE (OUTPATIENT)
Dept: NEPHROLOGY | Facility: CLINIC | Age: 63
End: 2023-12-06

## 2023-12-06 DIAGNOSIS — E87.1 HYPONATREMIA: ICD-10-CM

## 2023-12-06 DIAGNOSIS — K70.0 FATTY LIVER, ALCOHOLIC: ICD-10-CM

## 2023-12-06 DIAGNOSIS — E05.90 HYPERTHYROIDISM, SUBCLINICAL: ICD-10-CM

## 2023-12-06 DIAGNOSIS — E83.42 HYPOMAGNESEMIA: ICD-10-CM

## 2023-12-06 DIAGNOSIS — R79.89 ABNORMAL THYROID STIMULATING HORMONE LEVEL: ICD-10-CM

## 2023-12-06 DIAGNOSIS — E87.1 CHRONIC HYPONATREMIA: ICD-10-CM

## 2023-12-06 DIAGNOSIS — K92.1 MELENA: ICD-10-CM

## 2023-12-06 LAB
ALBUMIN SERPL BCP-MCNC: 4 G/DL (ref 3.5–5)
ALP SERPL-CCNC: 60 U/L (ref 34–104)
ALT SERPL W P-5'-P-CCNC: 75 U/L (ref 7–52)
ANION GAP SERPL CALCULATED.3IONS-SCNC: 4 MMOL/L
AST SERPL W P-5'-P-CCNC: 46 U/L (ref 13–39)
BASOPHILS # BLD AUTO: 0.08 THOUSANDS/ÂΜL (ref 0–0.1)
BASOPHILS NFR BLD AUTO: 1 % (ref 0–1)
BILIRUB SERPL-MCNC: 0.64 MG/DL (ref 0.2–1)
BUN SERPL-MCNC: 23 MG/DL (ref 5–25)
CALCIUM SERPL-MCNC: 9.2 MG/DL (ref 8.4–10.2)
CHLORIDE SERPL-SCNC: 99 MMOL/L (ref 96–108)
CO2 SERPL-SCNC: 24 MMOL/L (ref 21–32)
CREAT SERPL-MCNC: 1.2 MG/DL (ref 0.6–1.3)
EOSINOPHIL # BLD AUTO: 0.21 THOUSAND/ÂΜL (ref 0–0.61)
EOSINOPHIL NFR BLD AUTO: 3 % (ref 0–6)
ERYTHROCYTE [DISTWIDTH] IN BLOOD BY AUTOMATED COUNT: 14.4 % (ref 11.6–15.1)
GFR SERPL CREATININE-BSD FRML MDRD: 64 ML/MIN/1.73SQ M
GLUCOSE SERPL-MCNC: 120 MG/DL (ref 65–140)
HCT VFR BLD AUTO: 36.5 % (ref 36.5–49.3)
HGB BLD-MCNC: 12.1 G/DL (ref 12–17)
IMM GRANULOCYTES # BLD AUTO: 0.05 THOUSAND/UL (ref 0–0.2)
IMM GRANULOCYTES NFR BLD AUTO: 1 % (ref 0–2)
INR PPP: 0.97 (ref 0.84–1.19)
LYMPHOCYTES # BLD AUTO: 1.15 THOUSANDS/ÂΜL (ref 0.6–4.47)
LYMPHOCYTES NFR BLD AUTO: 18 % (ref 14–44)
MAGNESIUM SERPL-MCNC: 1.3 MG/DL (ref 1.9–2.7)
MCH RBC QN AUTO: 33.3 PG (ref 26.8–34.3)
MCHC RBC AUTO-ENTMCNC: 33.2 G/DL (ref 31.4–37.4)
MCV RBC AUTO: 101 FL (ref 82–98)
MONOCYTES # BLD AUTO: 1.29 THOUSAND/ÂΜL (ref 0.17–1.22)
MONOCYTES NFR BLD AUTO: 20 % (ref 4–12)
NEUTROPHILS # BLD AUTO: 3.73 THOUSANDS/ÂΜL (ref 1.85–7.62)
NEUTS SEG NFR BLD AUTO: 57 % (ref 43–75)
NRBC BLD AUTO-RTO: 0 /100 WBCS
PLATELET # BLD AUTO: 131 THOUSANDS/UL (ref 149–390)
PMV BLD AUTO: 10.6 FL (ref 8.9–12.7)
POTASSIUM SERPL-SCNC: 4.1 MMOL/L (ref 3.5–5.3)
PROT SERPL-MCNC: 7.1 G/DL (ref 6.4–8.4)
PROTHROMBIN TIME: 13 SECONDS (ref 11.6–14.5)
RBC # BLD AUTO: 3.63 MILLION/UL (ref 3.88–5.62)
SODIUM SERPL-SCNC: 127 MMOL/L (ref 135–147)
TSH SERPL DL<=0.05 MIU/L-ACNC: 0.69 UIU/ML (ref 0.45–4.5)
WBC # BLD AUTO: 6.51 THOUSAND/UL (ref 4.31–10.16)

## 2023-12-06 PROCEDURE — 36415 COLL VENOUS BLD VENIPUNCTURE: CPT

## 2023-12-06 PROCEDURE — 85610 PROTHROMBIN TIME: CPT

## 2023-12-06 PROCEDURE — 80053 COMPREHEN METABOLIC PANEL: CPT

## 2023-12-06 PROCEDURE — 83735 ASSAY OF MAGNESIUM: CPT

## 2023-12-06 PROCEDURE — 85025 COMPLETE CBC W/AUTO DIFF WBC: CPT

## 2023-12-06 PROCEDURE — 84443 ASSAY THYROID STIM HORMONE: CPT

## 2023-12-06 RX ORDER — SODIUM CHLORIDE 1 G/1
2 TABLET ORAL 3 TIMES DAILY
Qty: 270 TABLET | Refills: 3 | Status: SHIPPED | OUTPATIENT
Start: 2023-12-06

## 2023-12-06 RX ORDER — SODIUM CHLORIDE, SODIUM LACTATE, POTASSIUM CHLORIDE, CALCIUM CHLORIDE 600; 310; 30; 20 MG/100ML; MG/100ML; MG/100ML; MG/100ML
125 INJECTION, SOLUTION INTRAVENOUS CONTINUOUS
OUTPATIENT
Start: 2023-12-06

## 2023-12-06 NOTE — TELEPHONE ENCOUNTER
Sodium dropped to 127 meq/L it appears patient never got prescription for salt tablet as per my discussion with him. Informed patient about drop in sodium to 127. During hospital admission in November he was discharged on salt tablet 2 g 4 times a day. During recent hospital admission in December where he was admitted for alcoholic intoxication with alcohol level of 419 was admitted with admission sodium 129 and improved to 131 after IV fluid. It was down to 129 on 12/3 after which patient was discharged on salt tablet 1 g 3 times daily but patient has not been taking it as he never got any prescription    Cr 1.20     Plan  -Started on salt tablet 2 g 3 times daily, stressed on fluid restriction and avoiding alcohol intake. Recommend fluid restriction 45 ounces per day, BMP in 1 week. Prescription for salt tablets sent to  pharmacy as per my discussion with patient.   He has office appointment follow-up with Dr. Jake Meyers on 1/11

## 2023-12-07 ENCOUNTER — ANESTHESIA (OUTPATIENT)
Dept: GASTROENTEROLOGY | Facility: AMBULARY SURGERY CENTER | Age: 63
End: 2023-12-07

## 2023-12-07 ENCOUNTER — ANESTHESIA EVENT (OUTPATIENT)
Dept: GASTROENTEROLOGY | Facility: AMBULARY SURGERY CENTER | Age: 63
End: 2023-12-07

## 2023-12-07 ENCOUNTER — HOSPITAL ENCOUNTER (OUTPATIENT)
Dept: GASTROENTEROLOGY | Facility: AMBULARY SURGERY CENTER | Age: 63
Setting detail: OUTPATIENT SURGERY
End: 2023-12-07
Attending: INTERNAL MEDICINE
Payer: COMMERCIAL

## 2023-12-07 VITALS
RESPIRATION RATE: 18 BRPM | SYSTOLIC BLOOD PRESSURE: 120 MMHG | TEMPERATURE: 98.8 F | OXYGEN SATURATION: 100 % | WEIGHT: 205.03 LBS | HEIGHT: 74 IN | BODY MASS INDEX: 26.31 KG/M2 | DIASTOLIC BLOOD PRESSURE: 66 MMHG | HEART RATE: 60 BPM

## 2023-12-07 DIAGNOSIS — K92.1 MELENA: ICD-10-CM

## 2023-12-07 DIAGNOSIS — Z12.11 COLON CANCER SCREENING: ICD-10-CM

## 2023-12-07 LAB — GLUCOSE SERPL-MCNC: 112 MG/DL (ref 65–140)

## 2023-12-07 PROCEDURE — 88305 TISSUE EXAM BY PATHOLOGIST: CPT | Performed by: PATHOLOGY

## 2023-12-07 PROCEDURE — 45381 COLONOSCOPY SUBMUCOUS NJX: CPT | Performed by: INTERNAL MEDICINE

## 2023-12-07 PROCEDURE — 82948 REAGENT STRIP/BLOOD GLUCOSE: CPT

## 2023-12-07 PROCEDURE — 43239 EGD BIOPSY SINGLE/MULTIPLE: CPT | Performed by: INTERNAL MEDICINE

## 2023-12-07 PROCEDURE — 45385 COLONOSCOPY W/LESION REMOVAL: CPT | Performed by: INTERNAL MEDICINE

## 2023-12-07 RX ORDER — MIDAZOLAM HYDROCHLORIDE 2 MG/2ML
INJECTION, SOLUTION INTRAMUSCULAR; INTRAVENOUS AS NEEDED
Status: DISCONTINUED | OUTPATIENT
Start: 2023-12-07 | End: 2023-12-07

## 2023-12-07 RX ORDER — ESMOLOL HYDROCHLORIDE 10 MG/ML
INJECTION INTRAVENOUS AS NEEDED
Status: DISCONTINUED | OUTPATIENT
Start: 2023-12-07 | End: 2023-12-07

## 2023-12-07 RX ORDER — LIDOCAINE HYDROCHLORIDE 10 MG/ML
INJECTION, SOLUTION EPIDURAL; INFILTRATION; INTRACAUDAL; PERINEURAL AS NEEDED
Status: DISCONTINUED | OUTPATIENT
Start: 2023-12-07 | End: 2023-12-07

## 2023-12-07 RX ORDER — SODIUM CHLORIDE 9 MG/ML
1 INJECTION, SOLUTION INTRAVENOUS CONTINUOUS
Status: DISPENSED | OUTPATIENT
Start: 2023-12-07

## 2023-12-07 RX ORDER — PROPOFOL 10 MG/ML
INJECTION, EMULSION INTRAVENOUS CONTINUOUS PRN
Status: DISCONTINUED | OUTPATIENT
Start: 2023-12-07 | End: 2023-12-07

## 2023-12-07 RX ORDER — PROPOFOL 10 MG/ML
INJECTION, EMULSION INTRAVENOUS AS NEEDED
Status: DISCONTINUED | OUTPATIENT
Start: 2023-12-07 | End: 2023-12-07

## 2023-12-07 RX ADMIN — PROPOFOL 200 MG: 10 INJECTION, EMULSION INTRAVENOUS at 08:26

## 2023-12-07 RX ADMIN — ESMOLOL HYDROCHLORIDE 20 MG: 100 INJECTION, SOLUTION INTRAVENOUS at 08:23

## 2023-12-07 RX ADMIN — PROPOFOL 50 MG: 10 INJECTION, EMULSION INTRAVENOUS at 08:30

## 2023-12-07 RX ADMIN — MIDAZOLAM 2 MG: 1 INJECTION INTRAMUSCULAR; INTRAVENOUS at 08:21

## 2023-12-07 RX ADMIN — LIDOCAINE HYDROCHLORIDE 50 MG: 10 INJECTION, SOLUTION EPIDURAL; INFILTRATION; INTRACAUDAL; PERINEURAL at 08:26

## 2023-12-07 RX ADMIN — PROPOFOL 120 MCG/KG/MIN: 10 INJECTION, EMULSION INTRAVENOUS at 08:34

## 2023-12-07 RX ADMIN — SODIUM CHLORIDE 1 ML/KG/HR: 0.9 INJECTION, SOLUTION INTRAVENOUS at 08:07

## 2023-12-07 NOTE — H&P
History and Physical -  [facility]  Rosaline Parent 66 y.o. male MRN: [id number]    HPI: Rosaline Parent is a 66y.o. year old male who presents with GERD and colon cancer screening. Review of Systems    Historical Information   Past Medical History:   Diagnosis Date    Acute on chronic kidney failure      Alcohol withdrawal ([address]) 06/07/2019    Atrial fibrillation (HCC)     Cancer (HCC)     prostate ca,had radiation    Cardiac disease     stents,then triple bypass    COPD (chronic obstructive pulmonary disease) (HCC)     Coronary artery disease     ETOH abuse     Heart failure ([address])     History of heart surgery     says triple bypass [facility]    Hx of heart artery stent     2014    Hyperlipidemia     Hypertension     Hypovolemic shock ([address]) 12/22/2019    Lumbar spondylitis ([address]) 10/13/2022    Nasal bone fracture 10/10/2022    Prostate CA ([address])     S/P CABG x 3     2004    Sleep apnea      Past Surgical History:   Procedure Laterality Date    CARDIAC CATHETERIZATION      2 stents    CORONARY ARTERY BYPASS GRAFT  2004    [place] ARTHRD ANT INTERBODY MIN [address] CRV BELOW C2 N/A 12/16/2020    Procedure: Anterior cervical discectomy with fusion C4-C7;  Posterior cervical decompression and fusion C2-T2;  Surgeon: Mitra Pelayo MD;  Location: [facility];  Service: Neurosurgery    TONSILLECTOMY       Social History   Social History     Substance and Sexual Activity   Alcohol Use Not Currently    Alcohol/week: 4.0 standard drinks of alcohol    Types: 4 Standard drinks or equivalent per week    Comment: quart of vodka daily     Social History     Substance and Sexual Activity   Drug Use No     Social History     Tobacco Use   Smoking Status Every Day    Packs/day: 1.50    Years: 40.00    Total pack years: 60.00    Types: Cigarettes   Smokeless Tobacco Never     Family History   Problem Relation Age of Onset    Diabetes Mother     Uterine cancer Mother     COPD Father     Hypertension Father Meds/Allergies     (Not in a hospital admission)      No Known Allergies    Objective     /100 (BP Location: Left arm) Comment: monitor A-fib Dr Rossy Baldwin informed at bedside  Pulse 77   Temp 98.8 °F (37.1 °C) (Temporal)   Resp 20   Ht 6' 2" (1.88 m)   Wt 93 kg (205 lb 0.4 oz)   SpO2 98%   BMI 26.32 kg/m²       PHYSICAL EXAM    Gen: NAD  CV: RRR  CHEST: Clear  ABD: soft, NT/ND  EXT: no edema  Neuro: AAO      ASSESSMENT/PLAN:  This is a 66y.o. year old male here for GERD and colon cancer screening.      PLAN:   Procedure: egd/colonoscopy

## 2023-12-07 NOTE — ANESTHESIA POSTPROCEDURE EVALUATION
Post-Op Assessment Note    CV Status:  Stable  Pain Score: 0    Pain management: adequate       Mental Status:  Arousable and sleepy   Hydration Status:  Stable and euvolemic   PONV Controlled:  None   Airway Patency:  Patent     Post Op Vitals Reviewed: Yes      Staff: CRNA               BP   101/51   Temp      Pulse  55   Resp   16   SpO2   98

## 2023-12-07 NOTE — ANESTHESIA PREPROCEDURE EVALUATION
Procedure:  COLONOSCOPY  EGD    Relevant Problems   ANESTHESIA (within normal limits)      CARDIO   (+) CAD (coronary artery disease)   (+) Chest pain   (+) Essential hypertension   (+) History of Atrial fibrillation (HCC)   (+) Paroxysmal atrial fibrillation with rapid ventricular response (HCC)   (+) Stable angina      ENDO   (+) Type 2 diabetes mellitus (HCC)      GI/HEPATIC   (+) Fatty liver, alcoholic   (+) GERD (gastroesophageal reflux disease)      /RENAL   (+) Prostate cancer (HCC)   (+) Stage 3a chronic kidney disease (HCC)      HEMATOLOGY   (+) Thrombocytopenia (HCC)      NEURO/PSYCH   (+) Depression, recurrent (HCC)   (+) Stable angina      PULMONARY   (+) COPD (chronic obstructive pulmonary disease) (HCC)   (+) Sleep apnea        Physical Exam    Airway    Mallampati score: II    Neck ROM: full     Dental   Comment: Multiple missing teeth, denies loose tooth. Cardiovascular  Rhythm: irregular, Rate: normal    Pulmonary   Breath sounds clear to auscultation    Other Findings      Anesthesia Plan  ASA Score- 3     Anesthesia Type- IV sedation with anesthesia with ASA Monitors. Additional Monitors:     Airway Plan:            Plan Factors-Exercise tolerance (METS): >4 METS. Chart reviewed. EKG reviewed. Existing labs reviewed. Patient summary reviewed. Patient is a current smoker. Patient smoked on day of surgery. Obstructive sleep apnea risk education given perioperatively. Induction- intravenous. Postoperative Plan-     Informed Consent- Anesthetic plan and risks discussed with patient. I personally reviewed this patient with the CRNA. Discussed and agreed on the Anesthesia Plan with the CRNA. Greta Leger

## 2023-12-11 ENCOUNTER — TELEPHONE (OUTPATIENT)
Dept: NEPHROLOGY | Facility: CLINIC | Age: 63
End: 2023-12-11

## 2023-12-11 ENCOUNTER — LAB (OUTPATIENT)
Dept: LAB | Facility: HOSPITAL | Age: 63
End: 2023-12-11
Payer: COMMERCIAL

## 2023-12-11 DIAGNOSIS — E87.1 HYPONATREMIA: Primary | ICD-10-CM

## 2023-12-11 DIAGNOSIS — Z72.0 NICOTINE ABUSE: Chronic | ICD-10-CM

## 2023-12-11 DIAGNOSIS — E87.1 HYPONATREMIA: ICD-10-CM

## 2023-12-11 LAB
ANION GAP SERPL CALCULATED.3IONS-SCNC: 7 MMOL/L
BUN SERPL-MCNC: 12 MG/DL (ref 5–25)
CALCIUM SERPL-MCNC: 8.9 MG/DL (ref 8.4–10.2)
CHLORIDE SERPL-SCNC: 97 MMOL/L (ref 96–108)
CO2 SERPL-SCNC: 27 MMOL/L (ref 21–32)
CREAT SERPL-MCNC: 0.91 MG/DL (ref 0.6–1.3)
GFR SERPL CREATININE-BSD FRML MDRD: 90 ML/MIN/1.73SQ M
GLUCOSE P FAST SERPL-MCNC: 108 MG/DL (ref 65–99)
POTASSIUM SERPL-SCNC: 4.2 MMOL/L (ref 3.5–5.3)
SODIUM SERPL-SCNC: 131 MMOL/L (ref 135–147)

## 2023-12-11 PROCEDURE — 36415 COLL VENOUS BLD VENIPUNCTURE: CPT

## 2023-12-11 PROCEDURE — 88305 TISSUE EXAM BY PATHOLOGIST: CPT | Performed by: PATHOLOGY

## 2023-12-11 PROCEDURE — 80048 BASIC METABOLIC PNL TOTAL CA: CPT

## 2023-12-11 NOTE — TELEPHONE ENCOUNTER
----- Message from Carmen Vidales MD sent at 12/11/2023 12:28 PM EST -----  Please inform patient that sodium level improved to 131 with use of salt tablet 2 g 3 times a day, continue current dose of salt tablet and continue fluid restriction 45 ounces per day, please order for BMP to be done in 2 weeks for hyponatremia

## 2023-12-11 NOTE — RESULT ENCOUNTER NOTE
Please inform patient that sodium level improved to 131 with use of salt tablet 2 g 3 times a day, continue current dose of salt tablet and continue fluid restriction 45 ounces per day, please order for BMP to be done in 2 weeks for hyponatremia

## 2023-12-12 RX ORDER — VARENICLINE TARTRATE 1 MG/1
1 TABLET, FILM COATED ORAL 2 TIMES DAILY
Qty: 56 TABLET | Refills: 1 | Status: SHIPPED | OUTPATIENT
Start: 2023-12-12

## 2023-12-12 NOTE — TELEPHONE ENCOUNTER
Lm for Alyce Hyatt that Kandis Zamora agrees with seeing his PCP for BP check and pressure on his chest . IF the pressure does not improve advised to go to ED for eval.

## 2023-12-13 ENCOUNTER — RA CDI HCC (OUTPATIENT)
Dept: OTHER | Facility: HOSPITAL | Age: 63
End: 2023-12-13

## 2023-12-13 NOTE — PROGRESS NOTES
[address] [facility] coding opportunities          Chart Reviewed number of suggestions sent to Provider: 2     Patients Insurance   I13.0 and E11.22  Medicare Insurance: [facility] Complete

## 2023-12-14 ENCOUNTER — OFFICE VISIT (OUTPATIENT)
Dept: NEPHROLOGY | Facility: CLINIC | Age: 63
End: 2023-12-14
Payer: COMMERCIAL

## 2023-12-14 ENCOUNTER — OFFICE VISIT (OUTPATIENT)
Dept: FAMILY MEDICINE CLINIC | Facility: CLINIC | Age: 63
End: 2023-12-14
Payer: COMMERCIAL

## 2023-12-14 VITALS
SYSTOLIC BLOOD PRESSURE: 176 MMHG | BODY MASS INDEX: 27.21 KG/M2 | DIASTOLIC BLOOD PRESSURE: 80 MMHG | HEART RATE: 85 BPM | TEMPERATURE: 98.4 F | WEIGHT: 212 LBS | HEIGHT: 74 IN | RESPIRATION RATE: 20 BRPM | OXYGEN SATURATION: 99 %

## 2023-12-14 VITALS
HEART RATE: 76 BPM | DIASTOLIC BLOOD PRESSURE: 88 MMHG | SYSTOLIC BLOOD PRESSURE: 162 MMHG | BODY MASS INDEX: 27.34 KG/M2 | WEIGHT: 213 LBS | HEIGHT: 74 IN | OXYGEN SATURATION: 94 %

## 2023-12-14 DIAGNOSIS — F10.10 ALCOHOL ABUSE: Chronic | ICD-10-CM

## 2023-12-14 DIAGNOSIS — Z95.1 HX OF CABG: Chronic | ICD-10-CM

## 2023-12-14 DIAGNOSIS — I10 HYPERTENSION: Primary | ICD-10-CM

## 2023-12-14 DIAGNOSIS — E83.42 HYPOMAGNESEMIA: ICD-10-CM

## 2023-12-14 DIAGNOSIS — I10 ESSENTIAL HYPERTENSION: ICD-10-CM

## 2023-12-14 DIAGNOSIS — E87.1 HYPONATREMIA: Primary | ICD-10-CM

## 2023-12-14 PROCEDURE — 99214 OFFICE O/P EST MOD 30 MIN: CPT | Performed by: INTERNAL MEDICINE

## 2023-12-14 PROCEDURE — 99213 OFFICE O/P EST LOW 20 MIN: CPT | Performed by: FAMILY MEDICINE

## 2023-12-14 RX ORDER — NEBULIZER AND COMPRESSOR
EACH MISCELLANEOUS DAILY
Qty: 1 KIT | Refills: 0 | Status: SHIPPED | OUTPATIENT
Start: 2023-12-14

## 2023-12-14 NOTE — PROGRESS NOTES
NEPHROLOGY OFFICE PROGRESS NOTE   Rosaline Mejia 66 y.o. male MRN: [id number]  DATE: 12/14/23  Reason for visit: Continued evaluation and management of hyponatremia    ASSESSMENT & PLAN:  Hyponatremia:  Baseline sodium level is high 120s to low 130s since April 2021. Etiology initially felt to be volume depletion + alcohol use + poor solute intake. Na was 129 on discharge on 12/3/23. Na is up to 131 on 12/11/23 on salt tabs 2 gm TID. Interestingly, his sodium level has not normalized despite salt tablets, fluid restriction and avoidance of alcohol. This may suggest another etiology outside of volume depletion, poor solute intake, and alcohol use. Will recheck urine osm and urine Na prior to next visit. Check uric acid. For now, we will continue the same management - he can take the salt tabs 3 gm BID. Continue fluid restriction of 1500 cc/24 hours. Repeat BMP in 1 month. Hypertension  BP is high today. Goal <130/80  Current Rx: Lisinopril 10 mg OD, Metoprolol 25 mg BID. He missed his AM meds. Alcohol use  He has abstained from alcohol since 11/30/23. Patient Instructions   Your sodium level is improving - level was 131 recently (normal is 135). You can take the salt tablets 3 tablets twice a day (instead of 2 tablets 3x/day)  Continue to restrict your fluid intake to 50 oz a day. Repeat blood work in 1 month. Follow up in 4 months. SUBJECTIVE / INTERVAL HISTORY:  Rosaline Mejia is a 70-year-old gentleman who I am seeing in the office for the first time for continued evaluation of hyponatremia. He was seen by our service during a recent admission to [facility] between November 17 and 21, 2023. During that time, he presented with a fall in the setting of alcohol intoxication and was found to have a sodium of 125 on presentation. His hyponatremia was felt to be due to volume depletion, poor solute intake, and  alcohol use.     He had another hospital admission between November 30 and December 3, 2023 for alcohol intoxication. During this second admission, he was found to have a creatinine of 2.72 on presentation. His ENMA was felt to be due to volume depletion and he was discharged with a creatinine of 1.32 on December 3, 2023. We were not consulted the second admission. Since being discharged, he reports that he has abstained from alcohol. He is following a fluid restriction. He is taking salt tablets 2 g 3 times per day. He had repeat labs on December 6, 2023 which showed a sodium of 127. He reports that he started salt tablets around this time. Repeat blood work on December 11, 2023 revealed a sodium of 131. PMH/PSH: HTN, DM, HLP, CAD s/p 3V CABG, atrial fibrillation, alcohol dependence, COPD, GERD, SUNITA, prostate cancer s/p radiation. Previous work up:   11/18/23 urine osmolality 288, urine sodium 20    10/29/23 urine osmolality 173, urine sodium 28    9/20/23 serum osmolality 277    ALLERGIES: No Known Allergies    REVIEW OF SYSTEMS:  Review of Systems   Constitutional:  Positive for fatigue. Negative for appetite change, chills and fever. Respiratory:  Positive for cough. Negative for shortness of breath. Cardiovascular:  Negative for chest pain and leg swelling. Gastrointestinal:  Negative for abdominal pain, diarrhea, nausea and vomiting. Genitourinary:  Negative for hematuria. Musculoskeletal:  Positive for back pain. Negative for arthralgias. Neurological:  Negative for dizziness and light-headedness. OBJECTIVE:  /88 (BP Location: Left arm, Patient Position: Sitting, Cuff Size: Standard)   Pulse 76   Ht 6' 2" (1.88 m)   Wt 96.6 kg (213 lb)   SpO2 94%   BMI 27.35 kg/m²   Current Weight: Weight - Scale: 96.6 kg (213 lb) Body mass index is 27.35 kg/m². Physical Exam  Constitutional:       General: He is not in acute distress. Appearance: Normal appearance. He is well-developed. He is not ill-appearing or toxic-appearing. HENT:      Head: Normocephalic and atraumatic. Eyes:      General: No scleral icterus. Conjunctiva/sclera: Conjunctivae normal.   Neck:      Vascular: No JVD. Cardiovascular:      Rate and Rhythm: Normal rate and regular rhythm. Heart sounds: Normal heart sounds. Pulmonary:      Effort: Pulmonary effort is normal. No respiratory distress. Breath sounds: Normal breath sounds. Abdominal:      General: Bowel sounds are normal.      Palpations: Abdomen is soft. Musculoskeletal:      Cervical back: Neck supple. Right lower leg: No edema. Left lower leg: No edema. Skin:     General: Skin is warm and dry. Neurological:      Mental Status: He is alert and oriented to person, place, and time.    Psychiatric:         Behavior: Behavior normal.       Medications:  Current Outpatient Medications:     albuterol (Ventolin HFA) 90 mcg/act inhaler, Inhale 2 puffs every 4 (four) hours as needed for wheezing, Disp: 18 g, Rfl: 0    apixaban (ELIQUIS) 5 mg, Take 1 tablet (5 mg total) by mouth 2 (two) times a day, Disp: 60 tablet, Rfl: 5    aspirin (ECOTRIN LOW STRENGTH) 81 mg EC tablet, Take 81 mg by mouth daily, Disp: , Rfl:     Breo Ellipta 200-25 MCG/ACT inhaler, Inhale 1 puff daily Rinse mouth after use., Disp: 60 each, Rfl: 3    ferrous sulfate 325 (65 Fe) mg tablet, Take 1 tablet (325 mg total) by mouth daily with breakfast, Disp: 60 tablet, Rfl: 5    folic acid (FOLVITE) 1 mg tablet, TAKE 1 TABLET DAILY, Disp: 90 tablet, Rfl: 2    gabapentin (NEURONTIN) 300 mg capsule, TAKE ONE CAPSULE THREE TIMES DAILY, Disp: 90 capsule, Rfl: 1    lisinopril (ZESTRIL) 20 mg tablet, Take 0.5 tablets (10 mg total) by mouth daily, Disp: 60 tablet, Rfl: 0    magnesium Oxide (MAG-OX) 400 mg TABS, TAKE 1 TABLET TWO TIMES A DAY, Disp: 60 tablet, Rfl: 2    metFORMIN (GLUCOPHAGE) 500 mg tablet, Take 1 tablet (500 mg total) by mouth daily with breakfast Do not start before September 23, 2023., Disp: , Rfl: 0 metoprolol tartrate (LOPRESSOR) 25 mg tablet, Take 1 tablet (25 mg total) by mouth every 12 (twelve) hours, Disp: 60 tablet, Rfl: 0    naltrexone (REVIA) 50 mg tablet, Take 1 tablet (50 mg total) by mouth daily, Disp: 30 tablet, Rfl: 2    pantoprazole (PROTONIX) 40 mg tablet, Take 1 tablet (40 mg total) by mouth 2 (two) times a day, Disp: 60 tablet, Rfl: 1    pravastatin (PRAVACHOL) 40 mg tablet, TAKE 1 TABLET DAILY WITH DINNER, Disp: 90 tablet, Rfl: 2    ranolazine (RANEXA) 500 mg 12 hr tablet, TAKE 1 TABLET EVERY 12 HOURS, Disp: 60 tablet, Rfl: 3    sodium chloride 1 g tablet, Take 2 tablets (2 g total) by mouth 3 (three) times a day, Disp: 270 tablet, Rfl: 3    tamsulosin (FLOMAX) 0.4 mg, TAKE 1 CAPSULE DAILY, Disp: 90 capsule, Rfl: 1    Thiamine HCl (vitamin B-1) 100 MG TABS, TAKE 1 TABLET DAILY, Disp: 90 tablet, Rfl: 0    varenicline (CHANTIX) 1 mg tablet, TAKE 1 TABLET 2 TIMES A DAY, Disp: 56 tablet, Rfl: 1    Blood Glucose Monitoring Suppl (ONE TOUCH ULTRA MINI) w/Device KIT, Use as directed, Disp: , Rfl: 0    nicotine (NICODERM CQ) 21 mg/24 hr TD 24 hr patch, Place 1 patch on the skin over 24 hours daily Do not start before December 4, 2023. (Patient not taking: Reported on 12/14/2023), Disp: 28 patch, Rfl: 0    ONETOUCH DELICA LANCETS FINE MISC, 3 (three) times a day Test, Disp: , Rfl: 0    polyethylene glycol (COLYTE) 4000 mL solution, Take 4,000 mL by mouth once for 1 dose Take 4000 mL by mouth once for 1 dose.  Use as directed, Disp: 4000 mL, Rfl: 0    Laboratory Results:  Results for orders placed or performed in visit on 96/76/43   Basic metabolic panel   Result Value Ref Range    Sodium 131 (L) 135 - 147 mmol/L    Potassium 4.2 3.5 - 5.3 mmol/L    Chloride 97 96 - 108 mmol/L    CO2 27 21 - 32 mmol/L    ANION GAP 7 mmol/L    BUN 12 5 - 25 mg/dL    Creatinine 0.91 0.60 - 1.30 mg/dL    Glucose, Fasting 108 (H) 65 - 99 mg/dL    Calcium 8.9 8.4 - 10.2 mg/dL    eGFR 90 ml/min/1.73sq m

## 2023-12-14 NOTE — PATIENT INSTRUCTIONS
Your sodium level is improving - level was 131 recently (normal is 135). You can take the salt tablets 3 tablets twice a day (instead of 2 tablets 3x/day)  Continue to restrict your fluid intake to 50 oz a day. Repeat blood work in 1 month. Follow up in 4 months.

## 2023-12-14 NOTE — PROGRESS NOTES
"[facility] Office visit    Assessment/Plan:     1. Hypertension  /80, asymptomatic, denies headache, dizziness or visual changes.  Did not take prescribed hypertension medications today including metoprolol 25 mg every 12 hours or lisinopril 10 mg daily.  Recent blood pressures have been in the 120s/60s-80s on current meds.  Advised patient to return to reassess his blood pressure when he is adherent with his medications and to start monitoring his blood pressure at home.  -     Blood Pressure Monitoring (Adult Blood Pressure Cuff Lg) KIT; Use in the morning    2. Hx of CABG    3. Alcohol abuse  -     Vitamin B12/Folate, Serum Panel; Future    4. Hypomagnesemia  -     Magnesium; Future          Return in about 2 weeks (around 12/28/2023) for HTN follow in 1-2 weeks, AWV in 1 month.     Subjective:   LITO Partida is a 63 y.o. male who present for follow up of his hypertension.  He did not take his BP meds today including his Toprol or lisinopril.  He denies any headache, dizziness or visual changes.     Review of Systems   Eyes:  Negative for visual disturbance.   Neurological:  Negative for dizziness and headaches.        Objective:     BP (!) 176/80 (BP Location: Left arm, Patient Position: Sitting, Cuff Size: Standard)   Pulse 85   Temp 98.4 °F (36.9 °C) (Tympanic)   Resp 20   Ht 6' 2\" (1.88 m)   Wt 96.2 kg (212 lb)   SpO2 99%   BMI 27.22 kg/m²      Physical Exam  Constitutional:       General: He is not in acute distress.     Appearance: He is not ill-appearing, toxic-appearing or diaphoretic.   Cardiovascular:      Rate and Rhythm: Normal rate and regular rhythm.      Heart sounds: Normal heart sounds. No murmur heard.  Pulmonary:      Effort: Pulmonary effort is normal.      Breath sounds: Normal breath sounds.   Neurological:      Mental Status: He is alert and oriented to person, place, and time.   Psychiatric:         Mood and Affect: Mood normal.         Behavior: Behavior " normal.          ** Please Note: This note has been constructed using a voice recognition system **     Elliott Pablo MD  12/17/23  10:59 AM

## 2023-12-26 ENCOUNTER — TELEPHONE (OUTPATIENT)
Dept: GASTROENTEROLOGY | Facility: CLINIC | Age: 63
End: 2023-12-26

## 2023-12-26 NOTE — TELEPHONE ENCOUNTER
----- Message from Francisco Ferrara MD sent at 12/14/2023  9:28 AM EST -----  Please inform patient biopsies of small intestine were negative for celiac sprue, biopsies of stomach were negative for H. pylori.    The 2 polyps that were removed were precancerous polyps, tubular adenomas and they were completely removed.  There is no high-grade dysplasia and no cancer.    Patient had a poor prep, please put in for 1 year colonoscopy recall with a 2-day bowel prep.

## 2023-12-27 NOTE — DISCHARGE SUMMARY
Discharge Summary - [facility] (formerly [facility]) Family Medicine Residency     Patient Information: Gregg Partida 63 y.o. male MRN: [id number]  Unit/Bed#: [address] [place] [address] Encounter: [id number]     Admitting Physician: Irving Nascimento MD  Discharging Physician/Practitioner: Irving Nascimento MD   PCP: Korin Lo MD  Admission Date:   Admission Orders (From admission, onward)       Ordered        11/30/23 2150  INPATIENT ADMISSION  Once                          Discharge Date: 12/03/23      Reason for Admission: Alcohol abuse [F10.10]  Hypotension [I95.9]  S/P alcohol detoxification [Z92.89]  ENMA (acute kidney injury) (HCC) [N17.9]  Alcoholic intoxication with complication (HCC) [F10.929]     Discharge Diagnoses:      Principal Problem (Resolved):    Alcohol intoxication (HCC)  Active Problems:    Chronic hyponatremia    COPD (chronic obstructive pulmonary disease) (HCC)    CAD (coronary artery disease)    Type 2 diabetes mellitus (HCC)    Essential hypertension    Nicotine abuse    History of Atrial fibrillation (HCC)    Prostate cancer (HCC)    GERD (gastroesophageal reflux disease)  Resolved Problems:    Acute kidney injury (HCC)       Consultations During Hospital Stay:  ·       None     Procedures Performed:   ·       None     Significant Findings / Test Results:   12/3: Na 129, Cr 1.32  12/2: Na 131, Cr 1.46  12/1: Na 131, Cr 2.45, Trops WNL  11/30: Na 129, BUN 30, Cr 2.72, EtOH 419    - EKG: NSR Nonspecific ST abnormality  - CT abd/pelvis wo contrast: No acute abnormality. Hepatic steatosis. Colonic diverticulosis. Subacute right posterior 10th and 11th rib fractures.     Incidental Findings:   ·       None      Test Results Pending at Discharge (will require follow up):   ·       Repeat BMP in 1 week     Outpatient Tests Requested:  ·       None     Outpatient follow-up Requested:  ·       PCP, Nephrology    Complications: None    Things to address at first visit after hospitalization   Is the patient  taking salt tablets 3 times a day after discharge?  Is the patient  taking naltrexone?  Did the patient get his BMP labs done within 1 week of discharge to check his sodium levels?  Has patient completely cut down alcohol consumption?  Is the patient following up with outpatient alcohol program from [facility]?  Is the patient compliant with his medications?    Hospital Course:     Gregg Partida is a 63 y.o. male patient 11/30/2023 with history of alcohol abuse, CAD, A-fib on anticoagulation, diabetes mellitus type 2, hypertension, COPD, GERD, nicotine dependence, hyponatremia who presented due to alcohol intoxication requesting detox.  He had been drinking a quart of vodka on a daily basis and realizes alcohol was used to since getting out of control.  Patient reports that he has not been following with outpatient alcohol program or taking naltrexone. Symptoms on admission include headache & nausea.  In the ED alcohol level was 419.  Patient was placed on CIWA protocol and thiamine, folic acid and multivitamins were given.  Patient's blood pressure was initially low on admission and then normalized and patient was started on Lopressor. Lisinopril was continued due to patient's ENMA plan to start lisinopril on discharge.  Sodium level was 129 on admission and improved to 131 after giving him IV fluids NS 100ml/hr. Patient's sodium level on 12/3/2023 down to 129 again and patient was started on salt tablets 1 g 3 times daily. Patient had no withdrawal symptoms and was stable on discharge.      Chronic hyponatremia  Assessment & Plan  Due to on chronic in the setting of chronic alcohol use, volume depletion, poor oral intake and possible SIADH. Pt was on sodium tablet 1g QID for 2 weeks at last discharge. Na 31 POA baseline 129-30.  After starting patient on IV fluids NS 120ml/hr, sodium levels improved to 131.  IV Fluids were then discontinued. Na on 12/3/2023 dropped to 129      Plan  Patient to be discharged on  sodium chloride tablets 1 g 3 times a day for 2 weeks  Discussed with patient to repeat BMP at of this week  Continue fluid restriction  Follow-up with PCP after discharge    * Alcohol intoxication (HCC)-resolved as of 12/3/2023  Assessment & Plan  Pt states that he has been binge drinking and wants to detox  Pt refused to transfer to [place] for alcohol rehab  Pt did not have any symptoms of withdrawal POA  Ethanol: 419  He was placed on CIWA protocol and received a total of 5 mg of Ativan.  Also started on thiamine, multivitamin, folic acid patient withdrawal symptoms and was stable at discharge    Plan  Continue to take thiamine, folate and multivitamins   Patient to take naltrexone that prescribed during previous hospitalization  Restart home MAG-OX 400mg BID  Follow-up with [facility]   Follow-up with PCP after discharge    COPD (chronic obstructive pulmonary disease) (HCC)  Assessment & Plan  Patient has SOB at baseline, not on home oxygen. Currently smoking 1 packs/day.  Pt was on Xopenex nebulizer and Atrovent during hospitalization    Plan  Continue home inhalers Breo Ellipta  Continue with nicotine patch   Continue chantix    CAD (coronary artery disease)  Assessment & Plan  Patient with hx of CAD s/p CABG on Eliquis 5 mg BID at home.  Inpatient labs showed thrombocytopenia in the setting of heavy alcohol abuse. Platelet count continues to be low, with stable hemoglobin and no recurrence of bleeding or melena.  Continue with Eliquis  Continue home ASA, statin, ranolazine      GERD (gastroesophageal reflux disease)  Assessment & Plan  Continue home protonix     Prostate cancer (HCC)  Assessment & Plan  Hx prostate cancer status postradiation  Continue home Flomax    History of Atrial fibrillation (HCC)  Assessment & Plan  Chronic, rate controlled  Home meds: Eliquis 5 mg BID, Lopressor 25 mg every 12 hrs, Ranexa 500 mg every 12 hrs  EKG on admission showed  NSR, LVH    "  Plan:  Continue home Eliquis, Ranexa and Lopressor     Nicotine abuse  Assessment & Plan  Chronic, current smoker   Continue Nicotine patch  Continue home chantix  smoking cessation education    Essential hypertension  Assessment & Plan  Chronic, stable.  Meds include lisinopril 10 mg daily and & Lopressor 25 mg BID. Lisinopril was held in the setting of ENMA and soft BP.  Creatinine trending down and is 1.32 on discharge.       Plan  Continue home Lopressor .  Continue with lisinopril 10 mg daily    Type 2 diabetes mellitus (HCC)  Assessment & Plan  Lab Results   Component Value Date    HGBA1C 6.1 (H) 09/20/2023       Recent Labs     12/02/23 2059 12/03/23  0003 12/03/23  0656 12/03/23  1114   POCGLU 140 111 128 179*       Adequate glycemic control with ISS and regular diet.    Continue home Metformin 500mg PO QD with breakfast  Continue with gabapentin milligrams 3 times daily for diabetic neuropathy    Acute kidney injury (HCC)-resolved as of 12/3/2023  Assessment & Plan  Cr 1.46 POA. Baseline 1.1.  Patient started on IV fluids 125 ml/hour which was then discontinued.  Lisinopril held in the setting of ENMA and soft BP    Plan  Restart lisinopril 10 mg daily  Repeat BMP in 1 week after discharge       Condition at Discharge: good      Discharge Day Visit / Exam:      Vitals: Blood Pressure: 125/68 (12/03/23 0800)  Pulse: 80 (12/03/23 0800)  Temperature: 98.2 °F (36.8 °C) (12/03/23 0800)  Temp Source: Oral (12/03/23 0800)  Respirations: 18 (12/03/23 0800)  Height: 6' 2\" (188 cm) (11/30/23 2333)  Weight - Scale: 91.1 kg (200 lb 12.8 oz) (11/30/23 2333)  SpO2: 93 % (12/03/23 0900)  Exam:   Physical Exam  Constitutional:       General: He is not in acute distress.     Appearance: Normal appearance. He is not ill-appearing, toxic-appearing or diaphoretic.   Neck:      Vascular: No carotid bruit.   Cardiovascular:      Rate and Rhythm: Normal rate. Rhythm irregular.      Pulses: Normal pulses.      Heart sounds: " Normal heart sounds. No murmur heard.     No friction rub. No gallop.   Pulmonary:      Effort: Pulmonary effort is normal. No respiratory distress.      Breath sounds: No stridor. No wheezing, rhonchi or rales.   Chest:      Chest wall: No tenderness.   Abdominal:      General: Bowel sounds are normal. There is no distension.      Palpations: Abdomen is soft. There is no mass.      Tenderness: There is no abdominal tenderness. There is no right CVA tenderness, left CVA tenderness, guarding or rebound.      Hernia: No hernia is present.   Musculoskeletal:         General: No swelling, tenderness, deformity or signs of injury. Normal range of motion.      Cervical back: Normal range of motion. No rigidity or tenderness.      Right lower leg: No edema.      Left lower leg: No edema.   Lymphadenopathy:      Cervical: No cervical adenopathy.   Skin:     General: Skin is warm.      Capillary Refill: Capillary refill takes less than 2 seconds.      Coloration: Skin is not jaundiced or pale.      Findings: No bruising, erythema, lesion or rash.   Neurological:      General: No focal deficit present.      Mental Status: He is alert and oriented to person, place, and time. Mental status is at baseline.      Comments: Very mild tremors on examination   Psychiatric:         Mood and Affect: Mood normal.            Discussion with Family: Yes  Patient Information Sharing:  Gregg Partida was notified today by inpatient team of the patient’s condition and current plan.       Discharge instructions/Information to patient and family:   See after visit summary for information provided to patient and family.       Discharge Medications:     Medication List      START taking these medications     levalbuterol 1.25 mg/3 mL nebulizer solution; Commonly known as:   XOPENEX; Take 3 mL (1.25 mg total) by nebulization 3 (three) times a day     CHANGE how you take these medications     nicotine 21 mg/24 hr TD 24 hr patch; Commonly known  as: NICODERM CQ;   Place 1 patch on the skin over 24 hours daily Do not start before December 4, 2023.; Start taking on: December 4, 2023; What changed: when to take   this   sodium chloride 1 g tablet; Take 1 tablet (1 g total) by mouth 3 (three)   times a day; What changed: how much to take, when to take this     CONTINUE taking these medications     albuterol 90 mcg/act inhaler; Commonly known as: Ventolin HFA; Inhale 2   puffs every 4 (four) hours as needed for wheezing   apixaban 5 mg; Commonly known as: ELIQUIS; Take 1 tablet (5 mg total) by   mouth 2 (two) times a day   aspirin 81 mg EC tablet; Commonly known as: ECOTRIN LOW STRENGTH   Breo Ellipta 200-25 mcg/actuation inhaler; Generic drug:   fluticasone-vilanterol; Inhale 1 puff daily Rinse mouth after use.   ferrous sulfate 325 (65 Fe) mg tablet; Take 1 tablet (325 mg total) by   mouth daily with breakfast   folic acid 1 mg tablet; Commonly known as: FOLVITE; TAKE 1 TABLET DAILY   gabapentin 300 mg capsule; Commonly known as: NEURONTIN; TAKE ONE   CAPSULE THREE TIMES DAILY   lisinopril 20 mg tablet; Commonly known as: ZESTRIL; Take 0.5 tablets   (10 mg total) by mouth daily   magnesium Oxide 400 mg Tabs; Commonly known as: MAG-OX; TAKE 1 TABLET   TWO TIMES A DAY   metFORMIN 500 mg tablet; Commonly known as: GLUCOPHAGE; Take 1 tablet   (500 mg total) by mouth daily with breakfast Do not start before September 23, 2023.   metoprolol tartrate 25 mg tablet; Commonly known as: LOPRESSOR; Take 1   tablet (25 mg total) by mouth every 12 (twelve) hours   naltrexone 50 mg tablet; Commonly known as: REVIA; Take 1 tablet (50 mg   total) by mouth daily   ONE TOUCH ULTRA MINI w/Device Kit   OneTouch Delica Lancets Fine Misc   pantoprazole 40 mg tablet; Commonly known as: PROTONIX; Take 1 tablet   (40 mg total) by mouth 2 (two) times a day   polyethylene glycol 4000 mL solution; Commonly known as: COLYTE; Take   4,000 mL by mouth once for 1 dose Take 4000 mL by  mouth once for 1 dose.   Use as directed   pravastatin 40 mg tablet; Commonly known as: PRAVACHOL; TAKE 1 TABLET   DAILY WITH DINNER   ranolazine 500 mg 12 hr tablet; Commonly known as: RANEXA; TAKE 1 TABLET   EVERY 12 HOURS   tamsulosin 0.4 mg; Commonly known as: FLOMAX; TAKE 1 CAPSULE DAILY   varenicline 1 mg tablet; Commonly known as: CHANTIX; TAKE 1 TABLET 2   TIMES A DAY   vitamin B-1 100 MG Tabs; TAKE 1 TABLET DAILY     STOP taking these medications     lidocaine 5 %; Commonly known as: LIDODERM        Disposition:   Home     For Discharges to [facility]:   ·       Not Applicable to this Patient - Not Applicable to this Patient     Discharge Statement:  I spent 20 minutes discharging the patient. This time was spent on the day of discharge. I had direct contact with the patient on the day of discharge. Greater than 50% of the total time was spent examining patient, answering all patient questions, arranging and discussing plan of care with patient as well as directly providing post-discharge instructions.  Additional time then spent on discharge activities.     ** Please Note: This note has been constructed using a voice recognition system **     Bipin Freed MD   12/03/23  2:46 PM

## 2024-01-03 NOTE — TELEPHONE ENCOUNTER
lmom asking patient to contact the office for results and recommendations  Colon recall placed  Letter sent

## 2024-01-22 ENCOUNTER — APPOINTMENT (EMERGENCY)
Dept: RADIOLOGY | Facility: HOSPITAL | Age: 64
DRG: 308 | End: 2024-01-22
Payer: COMMERCIAL

## 2024-01-22 ENCOUNTER — HOSPITAL ENCOUNTER (INPATIENT)
Facility: HOSPITAL | Age: 64
LOS: 5 days | Discharge: LEFT BEFORE DISCHARGE | DRG: 308 | End: 2024-01-27
Attending: EMERGENCY MEDICINE | Admitting: ANESTHESIOLOGY
Payer: COMMERCIAL

## 2024-01-22 DIAGNOSIS — I48.0 PAROXYSMAL ATRIAL FIBRILLATION (HCC): ICD-10-CM

## 2024-01-22 DIAGNOSIS — R79.89 ELEVATED TROPONIN: ICD-10-CM

## 2024-01-22 DIAGNOSIS — E83.42 HYPOMAGNESEMIA: ICD-10-CM

## 2024-01-22 DIAGNOSIS — F10.929 ACUTE ALCOHOL INTOXICATION (HCC): ICD-10-CM

## 2024-01-22 DIAGNOSIS — K59.00 CONSTIPATION: ICD-10-CM

## 2024-01-22 DIAGNOSIS — F10.10 ALCOHOL ABUSE: ICD-10-CM

## 2024-01-22 DIAGNOSIS — N18.31 STAGE 3A CHRONIC KIDNEY DISEASE (HCC): ICD-10-CM

## 2024-01-22 DIAGNOSIS — F10.931 DELIRIUM TREMENS (HCC): ICD-10-CM

## 2024-01-22 DIAGNOSIS — I48.91 ATRIAL FIBRILLATION WITH RAPID VENTRICULAR RESPONSE (HCC): Primary | ICD-10-CM

## 2024-01-22 DIAGNOSIS — D69.6 THROMBOCYTOPENIA (HCC): Chronic | ICD-10-CM

## 2024-01-22 DIAGNOSIS — N17.9 AKI (ACUTE KIDNEY INJURY) (HCC): ICD-10-CM

## 2024-01-22 LAB
2HR DELTA HS TROPONIN: -27 NG/L
4HR DELTA HS TROPONIN: -32 NG/L
ALBUMIN SERPL BCP-MCNC: 4.4 G/DL (ref 3.5–5)
ALP SERPL-CCNC: 90 U/L (ref 34–104)
ALT SERPL W P-5'-P-CCNC: 142 U/L (ref 7–52)
AMORPH URATE CRY URNS QL MICRO: ABNORMAL /HPF
AMPHETAMINES SERPL QL SCN: NEGATIVE
ANION GAP SERPL CALCULATED.3IONS-SCNC: 21 MMOL/L
ANISOCYTOSIS BLD QL SMEAR: PRESENT
AST SERPL W P-5'-P-CCNC: 247 U/L (ref 13–39)
ATRIAL RATE: 156 BPM
BACTERIA UR QL AUTO: ABNORMAL /HPF
BARBITURATES UR QL: NEGATIVE
BASE EX.OXY STD BLDV CALC-SCNC: 84.2 % (ref 60–80)
BASE EXCESS BLDV CALC-SCNC: -1.8 MMOL/L
BASOPHILS # BLD AUTO: 0.07 THOUSANDS/ÂΜL (ref 0–0.1)
BASOPHILS NFR BLD AUTO: 1 % (ref 0–1)
BENZODIAZ UR QL: NEGATIVE
BETA-HYDROXYBUTYRATE: 3.2 MMOL/L
BILIRUB SERPL-MCNC: 1.04 MG/DL (ref 0.2–1)
BILIRUB UR QL STRIP: ABNORMAL
BUN SERPL-MCNC: 47 MG/DL (ref 5–25)
CALCIUM SERPL-MCNC: 8.6 MG/DL (ref 8.4–10.2)
CARDIAC TROPONIN I PNL SERPL HS: 105 NG/L
CARDIAC TROPONIN I PNL SERPL HS: 110 NG/L
CARDIAC TROPONIN I PNL SERPL HS: 137 NG/L
CHLORIDE SERPL-SCNC: 93 MMOL/L (ref 96–108)
CLARITY UR: ABNORMAL
CO2 SERPL-SCNC: 21 MMOL/L (ref 21–32)
COCAINE UR QL: NEGATIVE
COLOR UR: YELLOW
CREAT SERPL-MCNC: 1.6 MG/DL (ref 0.6–1.3)
EOSINOPHIL # BLD AUTO: 0.02 THOUSAND/ÂΜL (ref 0–0.61)
EOSINOPHIL NFR BLD AUTO: 0 % (ref 0–6)
ERYTHROCYTE [DISTWIDTH] IN BLOOD BY AUTOMATED COUNT: 14.3 % (ref 11.6–15.1)
ETHANOL SERPL-MCNC: 373 MG/DL
GFR SERPL CREATININE-BSD FRML MDRD: 45 ML/MIN/1.73SQ M
GLUCOSE SERPL-MCNC: 184 MG/DL (ref 65–140)
GLUCOSE SERPL-MCNC: 74 MG/DL (ref 65–140)
GLUCOSE SERPL-MCNC: 76 MG/DL (ref 65–140)
GLUCOSE UR STRIP-MCNC: NEGATIVE MG/DL
HCO3 BLDV-SCNC: 23.7 MMOL/L (ref 24–30)
HCT VFR BLD AUTO: 43.7 % (ref 36.5–49.3)
HGB BLD-MCNC: 15.6 G/DL (ref 12–17)
HGB UR QL STRIP.AUTO: ABNORMAL
HYALINE CASTS #/AREA URNS LPF: ABNORMAL /LPF
IMM GRANULOCYTES # BLD AUTO: 0.02 THOUSAND/UL (ref 0–0.2)
IMM GRANULOCYTES NFR BLD AUTO: 0 % (ref 0–2)
KETONES UR STRIP-MCNC: ABNORMAL MG/DL
LACTATE SERPL-SCNC: 1.2 MMOL/L (ref 0.5–2)
LEUKOCYTE ESTERASE UR QL STRIP: NEGATIVE
LG PLATELETS BLD QL SMEAR: PRESENT
LIPASE SERPL-CCNC: 122 U/L (ref 11–82)
LYMPHOCYTES # BLD AUTO: 1.27 THOUSANDS/ÂΜL (ref 0.6–4.47)
LYMPHOCYTES NFR BLD AUTO: 26 % (ref 14–44)
MAGNESIUM SERPL-MCNC: 1.4 MG/DL (ref 1.9–2.7)
MCH RBC QN AUTO: 33.8 PG (ref 26.8–34.3)
MCHC RBC AUTO-ENTMCNC: 35.7 G/DL (ref 31.4–37.4)
MCV RBC AUTO: 95 FL (ref 82–98)
METHADONE UR QL: NEGATIVE
MONOCYTES # BLD AUTO: 0.32 THOUSAND/ÂΜL (ref 0.17–1.22)
MONOCYTES NFR BLD AUTO: 6 % (ref 4–12)
NEUTROPHILS # BLD AUTO: 3.27 THOUSANDS/ÂΜL (ref 1.85–7.62)
NEUTS SEG NFR BLD AUTO: 67 % (ref 43–75)
NITRITE UR QL STRIP: NEGATIVE
NON-SQ EPI CELLS URNS QL MICRO: ABNORMAL /HPF
NRBC BLD AUTO-RTO: 0 /100 WBCS
O2 CT BLDV-SCNC: 19.1 ML/DL
OPIATES UR QL SCN: NEGATIVE
OXYCODONE+OXYMORPHONE UR QL SCN: NEGATIVE
PCO2 BLDV: 42.7 MM HG (ref 42–50)
PCP UR QL: NEGATIVE
PH BLDV: 7.36 [PH] (ref 7.3–7.4)
PH UR STRIP.AUTO: 6 [PH]
PLATELET # BLD AUTO: 50 THOUSANDS/UL (ref 149–390)
PLATELET BLD QL SMEAR: ABNORMAL
PMV BLD AUTO: 11.5 FL (ref 8.9–12.7)
PO2 BLDV: 59.4 MM HG (ref 35–45)
POTASSIUM SERPL-SCNC: 4.2 MMOL/L (ref 3.5–5.3)
PROT SERPL-MCNC: 7.8 G/DL (ref 6.4–8.4)
PROT UR STRIP-MCNC: ABNORMAL MG/DL
QRS AXIS: 102 DEGREES
QRSD INTERVAL: 84 MS
QT INTERVAL: 252 MS
QTC INTERVAL: 392 MS
RBC # BLD AUTO: 4.62 MILLION/UL (ref 3.88–5.62)
RBC #/AREA URNS AUTO: ABNORMAL /HPF
RBC MORPH BLD: PRESENT
SODIUM SERPL-SCNC: 135 MMOL/L (ref 135–147)
SP GR UR STRIP.AUTO: 1.02 (ref 1–1.03)
T WAVE AXIS: -48 DEGREES
THC UR QL: NEGATIVE
UROBILINOGEN UR QL STRIP.AUTO: 1 E.U./DL
VENTRICULAR RATE: 146 BPM
WBC # BLD AUTO: 4.97 THOUSAND/UL (ref 4.31–10.16)
WBC #/AREA URNS AUTO: ABNORMAL /HPF

## 2024-01-22 PROCEDURE — 93005 ELECTROCARDIOGRAM TRACING: CPT

## 2024-01-22 PROCEDURE — 84484 ASSAY OF TROPONIN QUANT: CPT | Performed by: EMERGENCY MEDICINE

## 2024-01-22 PROCEDURE — 96365 THER/PROPH/DIAG IV INF INIT: CPT

## 2024-01-22 PROCEDURE — 81001 URINALYSIS AUTO W/SCOPE: CPT | Performed by: EMERGENCY MEDICINE

## 2024-01-22 PROCEDURE — 82805 BLOOD GASES W/O2 SATURATION: CPT | Performed by: EMERGENCY MEDICINE

## 2024-01-22 PROCEDURE — 80307 DRUG TEST PRSMV CHEM ANLYZR: CPT | Performed by: EMERGENCY MEDICINE

## 2024-01-22 PROCEDURE — 83690 ASSAY OF LIPASE: CPT | Performed by: EMERGENCY MEDICINE

## 2024-01-22 PROCEDURE — 83605 ASSAY OF LACTIC ACID: CPT

## 2024-01-22 PROCEDURE — 82077 ASSAY SPEC XCP UR&BREATH IA: CPT | Performed by: EMERGENCY MEDICINE

## 2024-01-22 PROCEDURE — 70450 CT HEAD/BRAIN W/O DYE: CPT

## 2024-01-22 PROCEDURE — 96376 TX/PRO/DX INJ SAME DRUG ADON: CPT

## 2024-01-22 PROCEDURE — 82948 REAGENT STRIP/BLOOD GLUCOSE: CPT

## 2024-01-22 PROCEDURE — 36415 COLL VENOUS BLD VENIPUNCTURE: CPT | Performed by: EMERGENCY MEDICINE

## 2024-01-22 PROCEDURE — 80053 COMPREHEN METABOLIC PANEL: CPT | Performed by: EMERGENCY MEDICINE

## 2024-01-22 PROCEDURE — 99291 CRITICAL CARE FIRST HOUR: CPT | Performed by: EMERGENCY MEDICINE

## 2024-01-22 PROCEDURE — 83735 ASSAY OF MAGNESIUM: CPT | Performed by: EMERGENCY MEDICINE

## 2024-01-22 PROCEDURE — G1004 CDSM NDSC: HCPCS

## 2024-01-22 PROCEDURE — 71045 X-RAY EXAM CHEST 1 VIEW: CPT

## 2024-01-22 PROCEDURE — 99285 EMERGENCY DEPT VISIT HI MDM: CPT

## 2024-01-22 PROCEDURE — 82010 KETONE BODYS QUAN: CPT | Performed by: EMERGENCY MEDICINE

## 2024-01-22 PROCEDURE — 96375 TX/PRO/DX INJ NEW DRUG ADDON: CPT

## 2024-01-22 PROCEDURE — 96367 TX/PROPH/DG ADDL SEQ IV INF: CPT

## 2024-01-22 PROCEDURE — 85025 COMPLETE CBC W/AUTO DIFF WBC: CPT | Performed by: EMERGENCY MEDICINE

## 2024-01-22 RX ORDER — DILTIAZEM HYDROCHLORIDE 5 MG/ML
15 INJECTION INTRAVENOUS ONCE
Status: COMPLETED | OUTPATIENT
Start: 2024-01-22 | End: 2024-01-22

## 2024-01-22 RX ORDER — FLUTICASONE FUROATE AND VILANTEROL 200; 25 UG/1; UG/1
1 POWDER RESPIRATORY (INHALATION) DAILY
Status: DISCONTINUED | OUTPATIENT
Start: 2024-01-23 | End: 2024-01-27 | Stop reason: HOSPADM

## 2024-01-22 RX ORDER — LORAZEPAM 2 MG/ML
2 INJECTION INTRAMUSCULAR ONCE
Status: COMPLETED | OUTPATIENT
Start: 2024-01-22 | End: 2024-01-22

## 2024-01-22 RX ORDER — LANOLIN ALCOHOL/MO/W.PET/CERES
400 CREAM (GRAM) TOPICAL 2 TIMES DAILY
Status: DISCONTINUED | OUTPATIENT
Start: 2024-01-22 | End: 2024-01-27 | Stop reason: HOSPADM

## 2024-01-22 RX ORDER — ASPIRIN 81 MG/1
81 TABLET, CHEWABLE ORAL DAILY
Status: DISCONTINUED | OUTPATIENT
Start: 2024-01-23 | End: 2024-01-27 | Stop reason: HOSPADM

## 2024-01-22 RX ORDER — GABAPENTIN 300 MG/1
300 CAPSULE ORAL 3 TIMES DAILY
Status: DISCONTINUED | OUTPATIENT
Start: 2024-01-22 | End: 2024-01-27 | Stop reason: HOSPADM

## 2024-01-22 RX ORDER — FOLIC ACID 1 MG/1
1000 TABLET ORAL DAILY
Status: DISCONTINUED | OUTPATIENT
Start: 2024-01-23 | End: 2024-01-22

## 2024-01-22 RX ORDER — FERROUS SULFATE 325(65) MG
325 TABLET ORAL
Status: DISCONTINUED | OUTPATIENT
Start: 2024-01-23 | End: 2024-01-27 | Stop reason: HOSPADM

## 2024-01-22 RX ORDER — LANOLIN ALCOHOL/MO/W.PET/CERES
100 CREAM (GRAM) TOPICAL DAILY
Status: DISCONTINUED | OUTPATIENT
Start: 2024-01-23 | End: 2024-01-22

## 2024-01-22 RX ORDER — NICOTINE 21 MG/24HR
1 PATCH, TRANSDERMAL 24 HOURS TRANSDERMAL DAILY
Status: DISCONTINUED | OUTPATIENT
Start: 2024-01-23 | End: 2024-01-22

## 2024-01-22 RX ORDER — MAGNESIUM SULFATE HEPTAHYDRATE 40 MG/ML
2 INJECTION, SOLUTION INTRAVENOUS ONCE
Status: COMPLETED | OUTPATIENT
Start: 2024-01-22 | End: 2024-01-22

## 2024-01-22 RX ORDER — RANOLAZINE 500 MG/1
500 TABLET, EXTENDED RELEASE ORAL EVERY 12 HOURS
Status: DISCONTINUED | OUTPATIENT
Start: 2024-01-22 | End: 2024-01-27 | Stop reason: HOSPADM

## 2024-01-22 RX ORDER — ONDANSETRON 2 MG/ML
4 INJECTION INTRAMUSCULAR; INTRAVENOUS ONCE
Status: COMPLETED | OUTPATIENT
Start: 2024-01-22 | End: 2024-01-22

## 2024-01-22 RX ORDER — LANOLIN ALCOHOL/MO/W.PET/CERES
100 CREAM (GRAM) TOPICAL DAILY
Status: DISCONTINUED | OUTPATIENT
Start: 2024-01-23 | End: 2024-01-25

## 2024-01-22 RX ORDER — SODIUM CHLORIDE 1 G/1
2 TABLET ORAL 3 TIMES DAILY
Status: DISCONTINUED | OUTPATIENT
Start: 2024-01-22 | End: 2024-01-27 | Stop reason: HOSPADM

## 2024-01-22 RX ORDER — CALCIUM CARBONATE 500 MG/1
1000 TABLET, CHEWABLE ORAL DAILY PRN
Status: DISCONTINUED | OUTPATIENT
Start: 2024-01-22 | End: 2024-01-27 | Stop reason: HOSPADM

## 2024-01-22 RX ORDER — VARENICLINE TARTRATE 1 MG/1
1 TABLET, FILM COATED ORAL 2 TIMES DAILY
Status: DISCONTINUED | OUTPATIENT
Start: 2024-01-22 | End: 2024-01-27 | Stop reason: HOSPADM

## 2024-01-22 RX ORDER — NALTREXONE HYDROCHLORIDE 50 MG/1
50 TABLET, FILM COATED ORAL DAILY
Status: DISCONTINUED | OUTPATIENT
Start: 2024-01-23 | End: 2024-01-24

## 2024-01-22 RX ORDER — INSULIN LISPRO 100 [IU]/ML
1-6 INJECTION, SOLUTION INTRAVENOUS; SUBCUTANEOUS
Status: DISCONTINUED | OUTPATIENT
Start: 2024-01-22 | End: 2024-01-23

## 2024-01-22 RX ORDER — NICOTINE 21 MG/24HR
1 PATCH, TRANSDERMAL 24 HOURS TRANSDERMAL DAILY
Status: DISCONTINUED | OUTPATIENT
Start: 2024-01-23 | End: 2024-01-27 | Stop reason: HOSPADM

## 2024-01-22 RX ORDER — FOLIC ACID 1 MG/1
1 TABLET ORAL DAILY
Status: DISCONTINUED | OUTPATIENT
Start: 2024-01-23 | End: 2024-01-25

## 2024-01-22 RX ORDER — ALBUTEROL SULFATE 90 UG/1
2 AEROSOL, METERED RESPIRATORY (INHALATION) EVERY 4 HOURS PRN
Status: DISCONTINUED | OUTPATIENT
Start: 2024-01-22 | End: 2024-01-25

## 2024-01-22 RX ORDER — DEXTROSE AND SODIUM CHLORIDE 5; .9 G/100ML; G/100ML
75 INJECTION, SOLUTION INTRAVENOUS CONTINUOUS
Status: DISCONTINUED | OUTPATIENT
Start: 2024-01-22 | End: 2024-01-23

## 2024-01-22 RX ORDER — TAMSULOSIN HYDROCHLORIDE 0.4 MG/1
0.4 CAPSULE ORAL DAILY
Status: DISCONTINUED | OUTPATIENT
Start: 2024-01-23 | End: 2024-01-27 | Stop reason: HOSPADM

## 2024-01-22 RX ORDER — GUAIFENESIN 600 MG/1
600 TABLET, EXTENDED RELEASE ORAL EVERY 12 HOURS SCHEDULED
Status: DISCONTINUED | OUTPATIENT
Start: 2024-01-22 | End: 2024-01-26

## 2024-01-22 RX ORDER — PANTOPRAZOLE SODIUM 40 MG/1
40 TABLET, DELAYED RELEASE ORAL 2 TIMES DAILY
Status: DISCONTINUED | OUTPATIENT
Start: 2024-01-22 | End: 2024-01-27 | Stop reason: HOSPADM

## 2024-01-22 RX ADMIN — RANOLAZINE 500 MG: 500 TABLET, EXTENDED RELEASE ORAL at 21:26

## 2024-01-22 RX ADMIN — GABAPENTIN 300 MG: 300 CAPSULE ORAL at 21:22

## 2024-01-22 RX ADMIN — MAGNESIUM SULFATE HEPTAHYDRATE 2 G: 40 INJECTION, SOLUTION INTRAVENOUS at 17:58

## 2024-01-22 RX ADMIN — SODIUM CHLORIDE, SODIUM LACTATE, POTASSIUM CHLORIDE, AND CALCIUM CHLORIDE 1000 ML: .6; .31; .03; .02 INJECTION, SOLUTION INTRAVENOUS at 16:50

## 2024-01-22 RX ADMIN — GUAIFENESIN 600 MG: 600 TABLET, EXTENDED RELEASE ORAL at 22:58

## 2024-01-22 RX ADMIN — METOPROLOL TARTRATE 25 MG: 25 TABLET, FILM COATED ORAL at 21:22

## 2024-01-22 RX ADMIN — DILTIAZEM HYDROCHLORIDE 2.5 MG/HR: 5 INJECTION INTRAVENOUS at 18:49

## 2024-01-22 RX ADMIN — ONDANSETRON 4 MG: 2 INJECTION INTRAMUSCULAR; INTRAVENOUS at 16:47

## 2024-01-22 RX ADMIN — APIXABAN 5 MG: 5 TABLET, FILM COATED ORAL at 21:22

## 2024-01-22 RX ADMIN — DILTIAZEM HYDROCHLORIDE 15 MG: 5 INJECTION INTRAVENOUS at 16:47

## 2024-01-22 RX ADMIN — INSULIN LISPRO 1 UNITS: 100 INJECTION, SOLUTION INTRAVENOUS; SUBCUTANEOUS at 21:22

## 2024-01-22 RX ADMIN — LORAZEPAM 2 MG: 2 INJECTION INTRAMUSCULAR; INTRAVENOUS at 18:55

## 2024-01-22 RX ADMIN — DILTIAZEM HYDROCHLORIDE 15 MG: 5 INJECTION INTRAVENOUS at 18:12

## 2024-01-22 RX ADMIN — Medication 400 MG: at 21:26

## 2024-01-22 RX ADMIN — SODIUM CHLORIDE 2 G: 1 TABLET ORAL at 21:22

## 2024-01-22 RX ADMIN — DEXTROSE AND SODIUM CHLORIDE 75 ML/HR: 5; .9 INJECTION, SOLUTION INTRAVENOUS at 21:06

## 2024-01-22 RX ADMIN — PANTOPRAZOLE SODIUM 40 MG: 40 TABLET, DELAYED RELEASE ORAL at 21:22

## 2024-01-22 NOTE — H&P
History and Physical - [facility]  Family Medicine Residency     Patient Information: Gregg Partida 63 y.o. male MRN: [id number]  Unit/Bed#: [address] [place] [address] Encounter: [id number]  Admitting Physician: Samuel Thompson MD   PCP: Korin Lo MD  Date of Admission:  24     Assessment and Plan     * Atrial fibrillation with rapid ventricular response (HCC)  Assessment & Plan  Pt presented to ER with weakness and palpitations secondary to alcohol abuse. Pt has not taken home Lopressor and Eliquis since one week  POA pt had tachycardia  EKG on admission showed Afib with RVR; likely due to medication on compliance  23 TTE: LVEF 62%. Diastolic function is moderately abnormal, consistent with grade 2 (abnormal) relaxation. No significant valvular abnormality. Nuclear stress test a year ago was negative   Troponin 137  In the ER, he was given Cardizem and is currently on Cardizem drip      Plan  Cardiology consulted  [id number] stroke risk score: 3 points, moderate-high risk.   Pt NPO for possible JENNY and cardioversion tomorrow  Chest X-ray read pending - cardiomegaly present  Continue with Cardizem drip at 2.5 mg/hr  Continue with Lopressor 25 mg BID  Continue with Eliquis 5 mg BID  Monitor telemetry  Trend troponin     Alcohol intoxication (HCC)  Assessment & Plan  Patient presented to ER with generalized weakness secondary to alcohol use. He has been drinking a quart of vodka every day for the past 2 weeks, most recent drink was yesterday. He has also been vomiting and has mild abdominal pain. He  sustained a fall yesterday while he was intoxicated and has a bruise on right arm. He has not been taking medications since one week.  Patient quit drinking for 1 months however, restarted drinking 2 weeks ago because of his  son, who's favorite time of year was during the holidays. CIWA at the time of presentation 21   Alcohol level 313, AST and  and 142; Beta hydroxy  butyrate 3.2, Glucose 76 secondary to alcohol use  On presentation, pt was dehydrated, had tremors, palpitations and sweating; no signs of AMS  In the ER, patient received Ativan 2 mg IV, LR 1000 ml bolus    Plan  Pt placed on CIWA protocol   Seizure precautions   Thiamine, folate and multivitamins   IV fluid therapy with 5% dextrose in 0.9% normal saline  Continue with home Naltrexone 50 mg daily  Electrolyte repletion as needed  Monitor mental status    Hypomagnesemia  Assessment & Plan  Mg 1.4 on admission. Pt has chronic hypomagnesemia secondary to poor intake and chronic alcohol use and is on Mag-ox 400 mg BID. He has token his home meds since one week. Received 2g IV Mg sulfate in the ED.     Plan  Continue with Mag-ox 2 times a day  Replete as needed  Trend daily    COPD (chronic obstructive pulmonary disease) (HCC)  Assessment & Plan  Patient has some shortness of breath and cough at baseline. He is a chronic smoker; smokes one pack daily.   No wheezing or respiratory distress; pt not in exacerbation    Plan  Continue home inhalers Breo Ellipta  Monitor oxygen saturation  Mucinex for cough    Acute kidney injury superimposed on CKD   Assessment & Plan  Lab Results   Component Value Date    EGFR 45 01/22/2024    EGFR 90 12/11/2023    EGFR 64 12/06/2023    CREATININE 1.60 (H) 01/22/2024    CREATININE 0.91 12/11/2023    CREATININE 1.20 12/06/2023     Cr on admission 1.60. Baseline creatinine around 1 to 1.3. Prerenal secondary to dehydration and alcohol abuse. Patient has had previous admissions with [facility] secondary to alcohol abuse.     Plan  Close input and output monitoring  Daily weights  Holding Lisinopril for now  Avoid nephrotoxic medications  IV fluid therapy with D5NS  Monitor creatinine    CAD (coronary artery disease)  Assessment & Plan  Patient with hx of CAD s/p CABG on Eliquis 5 mg BID at home. He has not taken his medications since one week.   Home meds: Aspirin 81 mg, Eliquis 5 mg BID, Pravastatin  40 mg     Plan  Continue with Eliquis  Continue with aspirin and Ranolazine  Holding Pravastatin for now due to elevated AST and ALT    Hyponatremia  Assessment & Plan  POA Na 135. Pt has chronic hyponatremia in setting of chronic alcohol use, poor oral intake and possible SIADH. He is on salt tablets 2 g three times a day. Pt has not taken his medications since a week and has not eaten since last 4 days.     Plan.  Continue with salt tablets 2 g TID  Pt on D5 NS  Trend BMP    GERD (gastroesophageal reflux disease)  Assessment & Plan  Chronic, no complaints on admission  Continue home protonix         Thrombocytopenia (HCC)  Assessment & Plan  Chronic. Plts on admission 50. Baseline in low 100s. Secondary to alcohol abuse  Hemoglobin stable with no bleeding or melena.     Plan  Trend CBC daily; continue to monitor closely  Will hold anticoagulation in platelet count drops further  Consider transfusion if less than 20 or suspected bleed     Nicotine abuse  Assessment & Plan  Chronic, continues to smoke a pack daily    Plan  Continue Nicotine patch  Continue home chantix  smoking cessation education    Essential hypertension  Assessment & Plan  Chronic, stable. BP on admission 134/76 mm Hg. Home meds: Lisinopril 10 mg QD & Lopressor 25 mg BID. Pt has not taken his meds since one week.     Plan  Holding Lisinopril for now due to ENMA  Continue with Lopressor 25 mg BID  Monitor BP and V/S    Type 2 diabetes mellitus (HCC)  Assessment & Plan  Lab Results   Component Value Date    HGBA1C 6.1 (H) 09/20/2023     Recent Labs     01/22/24  1641   POCGLU 74     Chronic, controlled. Home med includes Metformin 500 mg daily    Plan  Holding home metformin  ISS and Accu-checks ACHS  Carb controlled diet  Continue with gabapentin 300 mg TID daily for diabetic neuropathy            Fluids: IV fluid therapy with 5% dextrose in 0.9% normal saline   Electrolyte repletion: Replete Mg now, and as needed.  Nutrition:        Diet Orders    (From admission, onward)                 Start     Ordered    01/23/24 0001  Diet Vlad/CHO Controlled; Consistent Carbohydrate Diet Level 2 (5 carb servings/75 grams CHO/meal)  Diet effective midnight        References:    Adult Nutrition Support Algorithm    RD Therapeutic Diet Order Protocol   Question Answer Comment   Diet Type Vlad/CHO Controlled    Vlad/CHO Controlled Consistent Carbohydrate Diet Level 2 (5 carb servings/75 grams CHO/meal)    RD to adjust diet per protocol? Yes        01/22/24 1938 01/22/24 1935  Diet Vlad/CHO Controlled; Consistent Carbohydrate Diet Level 2 (5 carb servings/75 grams CHO/meal)  Diet effective now        References:    Adult Nutrition Support Algorithm    RD Therapeutic Diet Order Protocol   Question Answer Comment   Diet Type Vlad/CHO Controlled    Vlad/CHO Controlled Consistent Carbohydrate Diet Level 2 (5 carb servings/75 grams CHO/meal)    RD to adjust diet per protocol? Yes        01/22/24 1937                   VTE Prophylaxis: VTE Score: 7 Moderate Risk (Score 3-4) - Pharmacological DVT Prophylaxis Contraindicated. Sequential Compression Devices Ordered.  Code Status: Prior  Anticipated Length of Stay:  Patient will be admitted on an Inpatient basis with an anticipated length of stay of more than 2 midnights.     Justification for Hospital Stay: Management of Afib with RVR and alcohol intoxication  Total Time for Visit, including Counseling / Coordination of Care: 40 mins. Greater than 50% of this total time spent on direct patient counseling and coordination of care.  Patient Information Sharing: Gregg Partida was notified today by inpatient team of the patient’s condition and current plan. All questions answered    Chief Complaint:     Chief Complaint   Patient presents with    Alcohol Intoxication     Pt arrived EMS. Pt reports that he has been drinking a quart of vodka for two weeks w/o taking his medication or eating. Pt c/o nausea & vomiting. Pt denies chest pain  or SOB.        Subjective      History of Present Illness:     Gregg Partida is a 63 y.o. male with PMH of alcohol abuse, CAD, A-fib on anticoagulation, diabetes mellitus type 2, hypertension, COPD, GERD, nicotine dependence, hyponatremia who presents with weakness secondary to alcohol intoxication. He reports taht he has been drinking a quart of Vodka every day for the past 2 weeks. He states that he was drinking once again as he was reliving his  son's memories whose favorite time of the year was [place]. He has been vomiting everyday in the morning for the past 2 weeks. He did not drink today and had anxiety, tremors, sweating, palpitations throughout the day. He has not been taking his medications for the past one week. Patient has not eaten in 4 days and has not had a BM. He  sustained a fall yesterday while he was intoxicated and has a bruise on right arm.  He had been going to family guidance [facility] in [place] for 2-3 months before. Pt appeared to be restless and a little agitated during encounter but is alert  and oriented to time, place and person. He denies any other complaints.      Review of Systems:  Review of Systems   Constitutional:  Positive for activity change, appetite change and fatigue. Negative for chills and fever.   HENT: Negative.  Negative for ear pain and sore throat.    Eyes: Negative.  Negative for photophobia, pain, discharge, redness, itching and visual disturbance.   Respiratory:  Positive for cough and shortness of breath. Negative for apnea, choking, chest tightness and stridor.    Cardiovascular:  Positive for palpitations. Negative for chest pain and leg swelling.   Gastrointestinal: Negative.  Negative for abdominal distention, abdominal pain, anal bleeding, blood in stool, constipation, diarrhea, nausea and vomiting.   Genitourinary:  Negative for dysuria and hematuria.   Musculoskeletal:  Negative for arthralgias and back pain.   Skin: Negative.  Negative for  color change, pallor, rash and wound.   Neurological:  Positive for tremors and headaches. Negative for dizziness, seizures, syncope, facial asymmetry, speech difficulty, weakness, light-headedness and numbness.   Hematological: Negative.  Negative for adenopathy. Does not bruise/bleed easily.   All other systems reviewed and are negative.       Past Medical History:   Diagnosis Date    Acute on chronic kidney failure      Alcohol withdrawal ([facility]) 06/07/2019    Atrial fibrillation (HCC)     Cancer ([facility])     prostate ca,had radiation    Cardiac disease     stents,then triple bypass    COPD (chronic obstructive pulmonary disease) ([facility])     Coronary artery disease     ETOH abuse     Heart failure ([facility])     History of heart surgery     says triple bypass [facility]    Hx of heart artery stent     2014    Hyperlipidemia     Hypertension     Hypovolemic shock ([facility]) 12/22/2019    Lumbar spondylitis ([facility]) 10/13/2022    Nasal bone fracture 10/10/2022    Prostate CA ([facility])     S/P CABG x 3     2004    Sleep apnea      Past Surgical History:   Procedure Laterality Date    CARDIAC CATHETERIZATION      2 stents    CORONARY ARTERY BYPASS GRAFT  2004    NM ARTHRD ANT INTERBODY MIN DSC CRV BELOW C2 N/A 12/16/2020    Procedure: Anterior cervical discectomy with fusion C4-C7; Posterior cervical decompression and fusion C2-T2;  Surgeon: David Rowell MD;  Location: [facility];  Service: Neurosurgery    TONSILLECTOMY       No Known Allergies  Prior to Admission Medications   Prescriptions Last Dose Informant Patient Reported? Taking?   Blood Glucose Monitoring Suppl (ONE TOUCH ULTRA MINI) w/Device KIT Past Week Self Yes Yes   Sig: Use as directed   Blood Pressure Monitoring (Adult Blood Pressure Cuff Lg) KIT   No No   Sig: Use in the morning   Breo Ellipta 200-25 MCG/ACT inhaler Past Week Self No Yes   Sig: Inhale 1 puff daily Rinse mouth after use.   ONETOUCH DELICA LANCETS FINE MISC  Self Yes No   Sig: 3 (three) times a day Test    Thiamine HCl (vitamin B-1) 100 MG TABS Past Week Self No Yes   Sig: TAKE 1 TABLET DAILY   albuterol (Ventolin HFA) 90 mcg/act inhaler Past Week Self No Yes   Sig: Inhale 2 puffs every 4 (four) hours as needed for wheezing   apixaban (ELIQUIS) 5 mg Past Week Self No Yes   Sig: Take 1 tablet (5 mg total) by mouth 2 (two) times a day   aspirin (ECOTRIN LOW STRENGTH) 81 mg EC tablet Past Week Self Yes Yes   Sig: Take 81 mg by mouth daily   ferrous sulfate 325 (65 Fe) mg tablet Past Week Self No Yes   Sig: Take 1 tablet (325 mg total) by mouth daily with breakfast   folic acid (FOLVITE) 1 mg tablet Past Week Self No Yes   Sig: TAKE 1 TABLET DAILY   gabapentin (NEURONTIN) 300 mg capsule Past Week Self No Yes   Sig: TAKE ONE CAPSULE THREE TIMES DAILY   lisinopril (ZESTRIL) 20 mg tablet Past Week Self No Yes   Sig: Take 0.5 tablets (10 mg total) by mouth daily   magnesium Oxide (MAG-OX) 400 mg TABS Past Week Self No Yes   Sig: TAKE 1 TABLET TWO TIMES A DAY   metFORMIN (GLUCOPHAGE) 500 mg tablet Past Week Self No Yes   Sig: Take 1 tablet (500 mg total) by mouth daily with breakfast Do not start before September 23, 2023.   metoprolol tartrate (LOPRESSOR) 25 mg tablet Past Week Self No Yes   Sig: Take 1 tablet (25 mg total) by mouth every 12 (twelve) hours   naltrexone (REVIA) 50 mg tablet Past Week Self No Yes   Sig: Take 1 tablet (50 mg total) by mouth daily   nicotine (NICODERM CQ) 21 mg/24 hr TD 24 hr patch  Self No No   Sig: Place 1 patch on the skin over 24 hours daily Do not start before December 4, 2023.   Patient not taking: Reported on 12/14/2023   pantoprazole (PROTONIX) 40 mg tablet Past Week  No Yes   Sig: Take 1 tablet (40 mg total) by mouth 2 (two) times a day   polyethylene glycol (COLYTE) 4000 mL solution   No No   Sig: Take 4,000 mL by mouth once for 1 dose Take 4000 mL by mouth once for 1 dose. Use as directed   pravastatin (PRAVACHOL) 40 mg tablet Past Week Self No Yes   Sig: TAKE 1 TABLET DAILY WITH  DINNER   ranolazine (RANEXA) 500 mg 12 hr tablet Past Week Self No Yes   Sig: TAKE 1 TABLET EVERY 12 HOURS   sodium chloride 1 g tablet Past Week  No Yes   Sig: Take 2 tablets (2 g total) by mouth 3 (three) times a day   tamsulosin (FLOMAX) 0.4 mg Past Week Self No Yes   Sig: TAKE 1 CAPSULE DAILY   varenicline (CHANTIX) 1 mg tablet Past Week  No Yes   Sig: TAKE 1 TABLET 2 TIMES A DAY      Facility-Administered Medications: None     Social History     Socioeconomic History    Marital status: Single     Spouse name: Not on file    Number of children: Not on file    Years of education: Not on file    Highest education level: Not on file   Occupational History    Occupation: contractor, 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)   Tobacco Use    Smoking status: Every Day     Current packs/day: 1.50     Average packs/day: 1.5 packs/day for 40.0 years (60.0 ttl pk-yrs)     Types: Cigarettes    Smokeless tobacco: Never   Vaping Use    Vaping status: Never Used   Substance and Sexual Activity    Alcohol use: Not Currently     Alcohol/week: 4.0 standard drinks of alcohol     Types: 4 Standard drinks or equivalent per week     Comment: quart of vodka daily    Drug use: No    Sexual activity: Not Currently   Other Topics Concern    Not on file   Social History Narrative    Not on file     Social Determinants of Health     Financial Resource Strain: Low Risk  (11/2/2023)    Overall Financial Resource Strain (CARDIA)     Difficulty of Paying Living Expenses: Not very hard   Food Insecurity: No Food Insecurity (12/1/2023)    Hunger Vital Sign     Worried About Running Out of Food in the Last Year: Never true     Ran Out of Food in the Last Year: Never true   Transportation Needs: No Transportation Needs (12/1/2023)    PRAPARE - Transportation     Lack of Transportation (Medical): No     Lack of Transportation (Non-Medical): No   Physical Activity: Not on file   Stress: No Stress Concern Present (11/2/2023)    [employer] [place]  [employer] - Occupational Stress Questionnaire     Feeling of Stress : Not at all   Social Connections: Unknown (4/15/2021)    Social Connection and Isolation Panel [NHANES]     Frequency of Communication with Friends and Family: More than three times a week     Frequency of Social Gatherings with Friends and Family: Not on file     Attends [employer] Services: Not on file     Active Member of Clubs or Organizations: Not on file     Attends Club or Organization Meetings: Not on file     Marital Status: Not on file   Intimate Partner Violence: Not on file   Housing Stability: Low Risk  (12/1/2023)    Housing Stability Vital Sign     Unable to Pay for Housing in the Last Year: No     Number of Places Lived in the Last Year: 1     Unstable Housing in the Last Year: No     Family History   Problem Relation Age of Onset    Diabetes Mother     Uterine cancer Mother     COPD Father     Hypertension Father         Patient Pre-hospital Living Situation: Home  Patient Pre-hospital Level of Mobility: Has issues with gait, uses walker occasionally   Patient Pre-hospital Diet Restrictions: None          Objective     Physical Exam:   Vitals:   Blood Pressure: 104/61 (01/22/24 2320)  Pulse: (!) 120 (01/22/24 2320)  Temperature: 98.2 °F (36.8 °C) (01/22/24 2017)  Temp Source: Oral (01/22/24 1651)  Respirations: 18 (01/22/24 2017)  SpO2: 92 % (01/22/24 2320)     Physical Exam  Constitutional:       Appearance: Normal appearance. He is ill-appearing.   Eyes:      Extraocular Movements: Extraocular movements intact.      Conjunctiva/sclera: Conjunctivae normal.      Pupils: Pupils are equal, round, and reactive to light.   Cardiovascular:      Rate and Rhythm: Tachycardia present. Rhythm irregular.      Heart sounds: Murmur heard.      No friction rub. No gallop.   Pulmonary:      Effort: Pulmonary effort is normal. No respiratory distress.      Breath sounds: No stridor. No wheezing, rhonchi or rales.   Chest:      Chest  wall: No tenderness.   Abdominal:      General: Bowel sounds are normal. There is no distension.      Palpations: Abdomen is soft. There is no mass.      Tenderness: There is no abdominal tenderness. There is no right CVA tenderness, left CVA tenderness, guarding or rebound.      Hernia: No hernia is present.   Musculoskeletal:         General: No swelling, tenderness, deformity or signs of injury. Normal range of motion.      Right lower leg: No edema.      Left lower leg: No edema.   Skin:     General: Skin is warm.      Capillary Refill: Capillary refill takes less than 2 seconds.      Coloration: Skin is not jaundiced or pale.      Findings: Bruising present. No erythema, lesion or rash.      Comments: Bruise on right arm   Neurological:      General: No focal deficit present.      Mental Status: He is alert and oriented to person, place, and time. Mental status is at baseline.      Motor: Tremor present.      Gait: Gait abnormal.   Psychiatric:         Mood and Affect: Mood normal.           Lab Results: I have personally reviewed pertinent reports.    Results from last 7 days   Lab Units 01/22/24  1653   WBC Thousand/uL 4.97   HEMOGLOBIN g/dL 15.6   HEMATOCRIT % 43.7   PLATELETS Thousands/uL 50*   NEUTROS PCT % 67   LYMPHS PCT % 26   MONOS PCT % 6   EOS PCT % 0     Results from last 7 days   Lab Units 01/22/24  1653   POTASSIUM mmol/L 4.2   CHLORIDE mmol/L 93*   CO2 mmol/L 21   BUN mg/dL 47*   CREATININE mg/dL 1.60*   CALCIUM mg/dL 8.6   ALK PHOS U/L 90   ALT U/L 142*   AST U/L 247*   EGFR ml/min/1.73sq m 45   MAGNESIUM mg/dL 1.4*         Results from last 7 days   Lab Units 01/22/24  2122   LACTIC ACID mmol/L 1.2              Results from last 7 days   Lab Units 01/22/24  1808   COLOR UA  Yellow   CLARITY UA  Slightly Cloudy   SPEC GRAV UA  1.025   PH UA  6.0   LEUKOCYTES UA  Negative   NITRITE UA  Negative   GLUCOSE UA mg/dl Negative   KETONES UA mg/dl 15 (1+)*   BILIRUBIN UA  Small*   BLOOD UA  Small*       Results from last 7 days   Lab Units 01/22/24  1808   RBC UA /hpf 0-1   WBC UA /hpf 0-1   EPITHELIAL CELLS WET PREP /hpf Occasional   BACTERIA UA /hpf Moderate*      Results from last 7 days   Lab Units 01/22/24 2122 01/22/24  1653   HS TNI 0HR ng/L  --  137*   HS TNI 2HR ng/L 110*  --              Imaging: I have personally reviewed pertinent reports.    CT head without contrast    Result Date: 1/22/2024  No acute intracranial abnormality. Workstation performed: [id number]         EKG, Pathology, and Other Studies Reviewed on Admission:   EKG  Result Date: 01/22/24  Impression:  Afib with RVR           ** Please Note: This note has been constructed using a voice recognition system **     Bipin Freed MD  01/22/24  11:30 PM

## 2024-01-22 NOTE — ED PROVIDER NOTES
History  Chief Complaint   Patient presents with    Alcohol Intoxication     Pt arrived EMS. Pt reports that he has been drinking a quart of vodka for two weeks w/o taking his medication or eating. Pt c/o nausea & vomiting. Pt denies chest pain or SOB.      Patient is a 63-year-old male with a history of A-fib on Eliquis, CKD, alcohol dependence, COPD, CAD, hypertension, hyperlipidemia that presents emergency department with EMS for evaluation of generalized weakness.  Patient admits to drinking a quart of vodka every day for the past 2 weeks, most recent drink was yesterday.  He states that he has been drinking because of his  son, who's favorite time of year was during the holidays.  Patient has been vomiting since he stopped drinking.  He complains of mild abdominal pain.  He admits that he has not been compliant with his medications.  Patient was initially unsure of trauma, however when a bruise was noticed to his right arm, he stated that he fell yesterday and hit his head. Ketones noted during my initial eval. Patient crying in the ED as he spoke about his son.      History provided by:  Patient   used: No        Prior to Admission Medications   Prescriptions Last Dose Informant Patient Reported? Taking?   Blood Glucose Monitoring Suppl (ONE TOUCH ULTRA MINI) w/Device KIT Past Week Self Yes Yes   Sig: Use as directed   Blood Pressure Monitoring (Adult Blood Pressure Cuff Lg) KIT   No No   Sig: Use in the morning   Breo Ellipta 200-25 MCG/ACT inhaler Past Week Self No Yes   Sig: Inhale 1 puff daily Rinse mouth after use.   ONETOUCH DELICA LANCETS FINE MISC  Self Yes No   Sig: 3 (three) times a day Test   Thiamine HCl (vitamin B-1) 100 MG TABS Past Week Self No Yes   Sig: TAKE 1 TABLET DAILY   albuterol (Ventolin HFA) 90 mcg/act inhaler Past Week Self No Yes   Sig: Inhale 2 puffs every 4 (four) hours as needed for wheezing   apixaban (ELIQUIS) 5 mg Past Week Self No Yes   Sig: Take 1  tablet (5 mg total) by mouth 2 (two) times a day   aspirin (ECOTRIN LOW STRENGTH) 81 mg EC tablet Past Week Self Yes Yes   Sig: Take 81 mg by mouth daily   ferrous sulfate 325 (65 Fe) mg tablet Past Week Self No Yes   Sig: Take 1 tablet (325 mg total) by mouth daily with breakfast   folic acid (FOLVITE) 1 mg tablet Past Week Self No Yes   Sig: TAKE 1 TABLET DAILY   gabapentin (NEURONTIN) 300 mg capsule Past Week Self No Yes   Sig: TAKE ONE CAPSULE THREE TIMES DAILY   lisinopril (ZESTRIL) 20 mg tablet Past Week Self No Yes   Sig: Take 0.5 tablets (10 mg total) by mouth daily   magnesium Oxide (MAG-OX) 400 mg TABS Past Week Self No Yes   Sig: TAKE 1 TABLET TWO TIMES A DAY   metFORMIN (GLUCOPHAGE) 500 mg tablet Past Week Self No Yes   Sig: Take 1 tablet (500 mg total) by mouth daily with breakfast Do not start before September 23, 2023.   metoprolol tartrate (LOPRESSOR) 25 mg tablet Past Week Self No Yes   Sig: Take 1 tablet (25 mg total) by mouth every 12 (twelve) hours   naltrexone (REVIA) 50 mg tablet Past Week Self No Yes   Sig: Take 1 tablet (50 mg total) by mouth daily   nicotine (NICODERM CQ) 21 mg/24 hr TD 24 hr patch  Self No No   Sig: Place 1 patch on the skin over 24 hours daily Do not start before December 4, 2023.   Patient not taking: Reported on 12/14/2023   pantoprazole (PROTONIX) 40 mg tablet Past Week  No Yes   Sig: Take 1 tablet (40 mg total) by mouth 2 (two) times a day   polyethylene glycol (COLYTE) 4000 mL solution   No No   Sig: Take 4,000 mL by mouth once for 1 dose Take 4000 mL by mouth once for 1 dose. Use as directed   pravastatin (PRAVACHOL) 40 mg tablet Past Week Self No Yes   Sig: TAKE 1 TABLET DAILY WITH DINNER   ranolazine (RANEXA) 500 mg 12 hr tablet Past Week Self No Yes   Sig: TAKE 1 TABLET EVERY 12 HOURS   sodium chloride 1 g tablet Past Week  No Yes   Sig: Take 2 tablets (2 g total) by mouth 3 (three) times a day   tamsulosin (FLOMAX) 0.4 mg Past Week Self No Yes   Sig: TAKE 1  CAPSULE DAILY   varenicline (CHANTIX) 1 mg tablet Past Week  No Yes   Sig: TAKE 1 TABLET 2 TIMES A DAY      Facility-Administered Medications: None       Past Medical History:   Diagnosis Date    Acute on chronic kidney failure      Alcohol withdrawal (HCC) 06/07/2019    Atrial fibrillation (HCC)     Cancer (HCC)     prostate ca,had radiation    Cardiac disease     stents,then triple bypass    COPD (chronic obstructive pulmonary disease) (HCC)     Coronary artery disease     ETOH abuse     Heart failure ([facility])     History of heart surgery     says triple bypass [facility]    Hx of heart artery stent     2014    Hyperlipidemia     Hypertension     Hypovolemic shock ([facility]) 12/22/2019    Lumbar spondylitis ([facility]) 10/13/2022    Nasal bone fracture 10/10/2022    Prostate CA ([facility])     S/P CABG x 3     2004    Sleep apnea        Past Surgical History:   Procedure Laterality Date    CARDIAC CATHETERIZATION      2 stents    CORONARY ARTERY BYPASS GRAFT  2004    AK ARTHRD ANT INTERBODY MIN DSC CRV BELOW C2 N/A 12/16/2020    Procedure: Anterior cervical discectomy with fusion C4-C7; Posterior cervical decompression and fusion C2-T2;  Surgeon: David Rowell MD;  Location: [facility];  Service: Neurosurgery    TONSILLECTOMY         Family History   Problem Relation Age of Onset    Diabetes Mother     Uterine cancer Mother     COPD Father     Hypertension Father      I have reviewed and agree with the history as documented.    E-Cigarette/Vaping    E-Cigarette Use Never User      E-Cigarette/Vaping Substances    Nicotine Yes     THC No     CBD No     Flavoring No     Other No     Unknown No      Social History     Tobacco Use    Smoking status: Every Day     Current packs/day: 1.50     Average packs/day: 1.5 packs/day for 40.0 years (60.0 ttl pk-yrs)     Types: Cigarettes    Smokeless tobacco: Never   Vaping Use    Vaping status: Never Used   Substance Use Topics    Alcohol use: Not Currently     Alcohol/week: 4.0 standard  drinks of alcohol     Types: 4 Standard drinks or equivalent per week     Comment: quart of vodka daily    Drug use: No       Review of Systems   Constitutional:  Negative for chills and fever.   Respiratory:  Negative for cough, shortness of breath and wheezing.    Cardiovascular:  Negative for chest pain and palpitations.   Gastrointestinal:  Positive for vomiting. Negative for abdominal pain, constipation, diarrhea and nausea.   Genitourinary:  Negative for dysuria, flank pain, hematuria and urgency.   Musculoskeletal:  Negative for back pain.   Skin:  Negative for color change and rash.   Neurological:  Positive for weakness.   All other systems reviewed and are negative.      Physical Exam  Physical Exam  Vitals and nursing note reviewed.   Constitutional:       Appearance: He is well-developed.   HENT:      Head: Normocephalic and atraumatic.   Eyes:      Pupils: Pupils are equal, round, and reactive to light.   Cardiovascular:      Rate and Rhythm: Tachycardia present. Rhythm irregular.      Heart sounds: Normal heart sounds.   Pulmonary:      Effort: Pulmonary effort is normal.      Breath sounds: Normal breath sounds.   Abdominal:      General: Bowel sounds are normal. There is no distension.      Palpations: Abdomen is soft. There is no mass.      Tenderness: There is no abdominal tenderness. There is no guarding or rebound.   Musculoskeletal:      Cervical back: Normal range of motion and neck supple.   Skin:     General: Skin is warm and dry.      Capillary Refill: Capillary refill takes less than 2 seconds.      Findings: Bruising present.   Neurological:      General: No focal deficit present.      Mental Status: He is alert and oriented to person, place, and time.      Motor: Tremor present.   Psychiatric:         Mood and Affect: Mood is anxious. Affect is tearful.         Behavior: Behavior normal.         Thought Content: Thought content normal.         Judgment: Judgment normal.         Vital  Signs  ED Triage Vitals   Temperature Pulse Respirations Blood Pressure SpO2   01/22/24 1651 01/22/24 1645 01/22/24 1645 01/22/24 1645 01/22/24 1645   98.8 °F (37.1 °C) (!) 149 21 134/76 90 %      Temp Source Heart Rate Source Patient Position - Orthostatic VS BP Location FiO2 (%)   01/22/24 1651 01/22/24 1645 01/22/24 1811 01/22/24 1811 --   Oral Monitor Lying Right arm       Pain Score       --                  Vitals:    01/22/24 1900 01/22/24 2017 01/22/24 2320 01/22/24 2320   BP: 155/77 137/78 104/61 104/61   Pulse: (!) 128 (!) 136 (!) 120 (!) 120   Patient Position - Orthostatic VS:             Visual Acuity      ED Medications  Medications   diltiazem (CARDIZEM) 125 mg in sodium chloride 0.9 % 125 mL infusion (7.5 mg/hr Intravenous Rate/Dose Change 1/22/24 2320)   apixaban (ELIQUIS) tablet 5 mg (5 mg Oral Given 1/22/24 2122)   aspirin chewable tablet 81 mg (has no administration in time range)   gabapentin (NEURONTIN) capsule 300 mg (300 mg Oral Given 1/22/24 2122)   varenicline (CHANTIX) tablet 1 mg (1 mg Oral Not Given 1/22/24 2124)   thiamine tablet 100 mg (has no administration in time range)   tamsulosin (FLOMAX) capsule 0.4 mg (has no administration in time range)   albuterol (PROVENTIL HFA,VENTOLIN HFA) inhaler 2 puff (has no administration in time range)   fluticasone-vilanterol 200-25 mcg/actuation 1 puff (has no administration in time range)   ferrous sulfate tablet 325 mg (has no administration in time range)   magnesium Oxide (MAG-OX) tablet 400 mg (400 mg Oral Given 1/22/24 2126)   metoprolol tartrate (LOPRESSOR) tablet 25 mg (25 mg Oral Given 1/22/24 2122)   naltrexone (REVIA) tablet 50 mg (has no administration in time range)   pantoprazole (PROTONIX) EC tablet 40 mg (40 mg Oral Given 1/22/24 2122)   ranolazine (RANEXA) 12 hr tablet 500 mg (500 mg Oral Given 1/22/24 2126)   sodium chloride tablet 2 g (2 g Oral Given 1/22/24 2122)   dextrose 5 % and sodium chloride 0.9 % infusion (75 mL/hr  Intravenous New Bag 1/22/24 2106)   folic acid (FOLVITE) tablet 1 mg (has no administration in time range)   multivitamin-minerals (CENTRUM) tablet 1 tablet (has no administration in time range)   nicotine (NICODERM CQ) 14 mg/24hr TD 24 hr patch 1 patch (has no administration in time range)   calcium carbonate (TUMS) chewable tablet 1,000 mg (has no administration in time range)   insulin lispro (HumaLOG) 100 units/mL subcutaneous injection 1-6 Units (1 Units Subcutaneous Given 1/22/24 2122)   guaiFENesin (MUCINEX) 12 hr tablet 600 mg (600 mg Oral Given 1/22/24 2258)   diltiazem (CARDIZEM) injection 15 mg (15 mg Intravenous Given 1/22/24 1647)   ondansetron (ZOFRAN) injection 4 mg (4 mg Intravenous Given 1/22/24 1647)   lactated ringers bolus 1,000 mL (0 mL Intravenous Stopped 1/22/24 1750)   magnesium sulfate 2 g/50 mL IVPB (premix) 2 g (0 g Intravenous Stopped 1/22/24 1849)   diltiazem (CARDIZEM) injection 15 mg (15 mg Intravenous Given 1/22/24 1812)   LORazepam (ATIVAN) injection 2 mg (2 mg Intravenous Given 1/22/24 1855)       Diagnostic Studies  Results Reviewed       Procedure Component Value Units Date/Time    HS Troponin I 4hr [[id number]] Collected: 01/22/24 2318    Lab Status: No result Specimen: Blood from Arm, Right     HS Troponin I 2hr [[id number]]  (Abnormal) Collected: 01/22/24 2122    Lab Status: Final result Specimen: Blood from Arm, Right Updated: 01/22/24 2153     hs TnI 2hr 110 ng/L      Delta 2hr hsTnI -27 ng/L     Lactic acid, plasma (w/reflex if result > 2.0) [[id number]]  (Normal) Collected: 01/22/24 2122    Lab Status: Final result Specimen: Blood from Arm, Right Updated: 01/22/24 2147     LACTIC ACID 1.2 mmol/L     Narrative:      Result may be elevated if tourniquet was used during collection.    Rapid drug screen, urine [[id number]]  (Normal) Collected: 01/22/24 1808    Lab Status: Final result Specimen: Urine, Clean Catch Updated: 01/22/24 1902     Amph/Meth UR Negative     Barbiturate Ur  Negative     Benzodiazepine Urine Negative     Cocaine Urine Negative     Methadone Urine Negative     Opiate Urine Negative     PCP Ur Negative     THC Urine Negative     Oxycodone Urine Negative    Narrative:      FOR MEDICAL PURPOSES ONLY.   IF CONFIRMATION NEEDED PLEASE CONTACT THE LAB WITHIN 5 DAYS.    Drug Screen Cutoff Levels:  AMPHETAMINE/METHAMPHETAMINES  1000 ng/mL  BARBITURATES     200 ng/mL  BENZODIAZEPINES     200 ng/mL  COCAINE      300 ng/mL  METHADONE      300 ng/mL  OPIATES      300 ng/mL  PHENCYCLIDINE     25 ng/mL  THC       50 ng/mL  OXYCODONE      100 ng/mL    Urine Microscopic [[id number]]  (Abnormal) Collected: 01/22/24 1808    Lab Status: Final result Specimen: Urine, Clean Catch Updated: 01/22/24 1859     RBC, UA 0-1 /hpf      WBC, UA 0-1 /hpf      Epithelial Cells Occasional /hpf      Bacteria, UA Moderate /hpf      Hyaline Casts, UA 1-2 /lpf      AMORPH URATES Occasional /hpf     UA (URINE) with reflex to Scope [[id number]]  (Abnormal) Collected: 01/22/24 1808    Lab Status: Final result Specimen: Urine, Clean Catch Updated: 01/22/24 1847     Color, UA Yellow     Clarity, UA Slightly Cloudy     Specific Gravity, UA 1.025     pH, UA 6.0     Leukocytes, UA Negative     Nitrite, UA Negative     Protein,  (2+) mg/dl      Glucose, UA Negative mg/dl      Ketones, UA 15 (1+) mg/dl      Urobilinogen, UA 1.0 E.U./dl      Bilirubin, UA Small     Occult Blood, UA Small    Smear Review(Phlebs Do Not Order) [[id number]]  (Abnormal) Collected: 01/22/24 1653    Lab Status: Final result Specimen: Blood from Arm, Left Updated: 01/22/24 1831     RBC Morphology Present     Platelet Estimate Decreased     Large Platelet Present     Anisocytosis Present    CBC and differential [[id number]]  (Abnormal) Collected: 01/22/24 1653    Lab Status: Final result Specimen: Blood from Arm, Left Updated: 01/22/24 1811     WBC 4.97 Thousand/uL      RBC 4.62 Million/uL      Hemoglobin 15.6 g/dL      Hematocrit 43.7 %       MCV 95 fL      MCH 33.8 pg      MCHC 35.7 g/dL      RDW 14.3 %      MPV 11.5 fL      Platelets 50 Thousands/uL      nRBC 0 /100 WBCs      Neutrophils Relative 67 %      Immat GRANS % 0 %      Lymphocytes Relative 26 %      Monocytes Relative 6 %      Eosinophils Relative 0 %      Basophils Relative 1 %      Neutrophils Absolute 3.27 Thousands/µL      Immature Grans Absolute 0.02 Thousand/uL      Lymphocytes Absolute 1.27 Thousands/µL      Monocytes Absolute 0.32 Thousand/µL      Eosinophils Absolute 0.02 Thousand/µL      Basophils Absolute 0.07 Thousands/µL     Narrative:      This is an appended report.  These results have been appended to a previously verified report.    HS Troponin 0hr (reflex protocol) [[id number]]  (Abnormal) Collected: 01/22/24 1653    Lab Status: Final result Specimen: Blood from Arm, Left Updated: 01/22/24 1731     hs TnI 0hr 137 ng/L     Comprehensive metabolic panel [[id number]]  (Abnormal) Collected: 01/22/24 1653    Lab Status: Final result Specimen: Blood from Arm, Left Updated: 01/22/24 1724     Sodium 135 mmol/L      Potassium 4.2 mmol/L      Chloride 93 mmol/L      CO2 21 mmol/L      ANION GAP 21 mmol/L      BUN 47 mg/dL      Creatinine 1.60 mg/dL      Glucose 76 mg/dL      Calcium 8.6 mg/dL       U/L       U/L      Alkaline Phosphatase 90 U/L      Total Protein 7.8 g/dL      Albumin 4.4 g/dL      Total Bilirubin 1.04 mg/dL      eGFR 45 ml/min/1.73sq m     Narrative:      National Kid[employer] guidelines for Chronic Kidney Disease (CKD):     Stage 1 with normal or high GFR (GFR > 90 mL/min/1.73 square meters)    Stage 2 Mild CKD (GFR = 60-89 mL/min/1.73 square meters)    Stage 3A Moderate CKD (GFR = 45-59 mL/min/1.73 square meters)    Stage 3B Moderate CKD (GFR = 30-44 mL/min/1.73 square meters)    Stage 4 Severe CKD (GFR = 15-29 mL/min/1.73 square meters)    Stage 5 End Stage CKD (GFR <15 mL/min/1.73 square meters)  Note: GFR calculation is accurate only  with a steady state creatinine    Magnesium [[id number]]  (Abnormal) Collected: 01/22/24 1653    Lab Status: Final result Specimen: Blood from Arm, Left Updated: 01/22/24 1724     Magnesium 1.4 mg/dL     Lipase [[id number]]  (Abnormal) Collected: 01/22/24 1653    Lab Status: Final result Specimen: Blood from Arm, Left Updated: 01/22/24 1724     Lipase 122 u/L     Ethanol [[id number]]  (Abnormal) Collected: 01/22/24 1653    Lab Status: Final result Specimen: Blood from Arm, Left Updated: 01/22/24 1722     Ethanol Lvl 373 mg/dL     Beta Hydroxybutyrate [[id number]]  (Abnormal) Collected: 01/22/24 1653    Lab Status: Final result Specimen: Blood from Arm, Left Updated: 01/22/24 1711     BETA-HYDROXYBUTYRATE 3.2 mmol/L     Blood gas, venous [[id number]]  (Abnormal) Collected: 01/22/24 1653    Lab Status: Final result Specimen: Blood from Arm, Left Updated: 01/22/24 1705     pH, Parrish 7.362     pCO2, Parrish 42.7 mm Hg      pO2, Parrish 59.4 mm Hg      HCO3, Parrish 23.7 mmol/L      Base Excess, Parrish -1.8 mmol/L      O2 Content, Parrish 19.1 ml/dL      O2 HGB, VENOUS 84.2 %     Fingerstick Glucose (POCT) [[id number]]  (Normal) Collected: 01/22/24 1641    Lab Status: Final result Updated: 01/22/24 1648     POC Glucose 74 mg/dl                    CT head without contrast   Final Result by Long Sultana MD (01/22 1740)      No acute intracranial abnormality.                  Workstation performed: [id number]         XR chest 1 view portable   ED Interpretation by Franc Juarez DO (01/22 1750)   cardiomegaly                 Procedures  ECG 12 Lead Documentation Only    Date/Time: 1/22/2024 5:14 PM    Performed by: Franc Juarez DO  Authorized by: Franc Juarez DO    Indications / Diagnosis:  Palpitations  ECG reviewed by me, the ED Provider: yes    Patient location:  ED  Previous ECG:     Previous ECG:  Unavailable    Comparison to cardiac monitor: Yes    Interpretation:     Interpretation: abnormal    Rate:     ECG rate:   146bpm    ECG rate assessment: tachycardic    Rhythm:     Rhythm: atrial fibrillation    Ectopy:     Ectopy: none    QRS:     QRS axis:  Normal  Conduction:     Conduction: normal    ST segments:     ST segments:  Normal  T waves:     T waves: normal    CriticalCare Time    Date/Time: 1/22/2024 11:15 PM    Performed by: Franc Juarez DO  Authorized by: Franc Juarez DO    Critical care provider statement:     Critical care time (minutes):  65    Critical care time was exclusive of:  Separately billable procedures and treating other patients and teaching time    Critical care was necessary to treat or prevent imminent or life-threatening deterioration of the following conditions:  Circulatory failure, dehydration and renal failure    Critical care was time spent personally by me on the following activities:  Blood draw for specimens, obtaining history from patient or surrogate, development of treatment plan with patient or surrogate, evaluation of patient's response to treatment, examination of patient, interpretation of cardiac output measurements, ordering and review of laboratory studies, ordering and performing treatments and interventions, ordering and review of radiographic studies, re-evaluation of patient's condition and review of old charts    I assumed direction of critical care for this patient from another provider in my specialty: no             ED Course                                             Medical Decision Making  63-year-old male in the ED, acutely intoxicated, in A-fib with RVR.  Patient initially thought to be in DKA, as he has not been compliant with his diabetic medications, however patient is normoglycemic on initial evaluation.  Labs reviewed.  Patient has an ENMA, likely secondary to dehydration.  Magnesium is 1.4.  Troponin noted to be 137.  Patient denies chest pain or shortness of breath.  CT head -negative for acute traumatic pathology, chest x-ray reviewed and  interpreted by me-cardiomegaly. Patient's ventricular rate was transiently controlled with a bolus of Cardizem.  Patient subsequently placed on a drip a continuous drip of 2.5 mg/h and admitted to [facility] the [facility] service.     Amount and/or Complexity of Data Reviewed  Labs: ordered.  Radiology: ordered and independent interpretation performed.    Risk  Prescription drug management.  Decision regarding hospitalization.             Disposition  Final diagnoses:   Atrial fibrillation with rapid ventricular response (HCC)   ENMA (acute kidney injury) (HCC)   Acute alcohol intoxication (HCC)   Hypomagnesemia   Elevated troponin     Time reflects when diagnosis was documented in both MDM as applicable and the Disposition within this note       Time User Action Codes Description Comment    1/22/2024  6:09 PM Oyesanmi, Olubusola O Add [I48.91] Atrial fibrillation with rapid ventricular response (HCC)     1/22/2024  6:09 PM Oyesanmi, Olubusola O Add [N17.9] ENMA (acute kidney injury) (HCC)     1/22/2024  6:09 PM Oyesanmi, Olubusola O Add [F10.929] Acute alcohol intoxication (HCC)     1/22/2024  6:09 PM Oyesanmi, Olubusola O Add [E83.42] Hypomagnesemia     1/22/2024  6:09 PM Oyesanmi, Olubusola O Add [R79.89] Elevated troponin           ED Disposition       ED Disposition   Admit    Condition   Stable    Date/Time   Mon Jan 22, 2024  6:09 PM    Comment   Case was discussed with DR Thompson and the patient's admission status was agreed to be Admission Status: inpatient status to the service of Dr. Thompson .               Follow-up Information    None         Current Discharge Medication List        CONTINUE these medications which have NOT CHANGED    Details   albuterol (Ventolin HFA) 90 mcg/act inhaler Inhale 2 puffs every 4 (four) hours as needed for wheezing  Qty: 18 g, Refills: 0    Comments: Substitution to a formulary equivalent within the same pharmaceutical class is authorized.  Associated Diagnoses: COPD  (chronic obstructive pulmonary disease) ([facility])      apixaban (ELIQUIS) 5 mg Take 1 tablet (5 mg total) by mouth 2 (two) times a day  Qty: 60 tablet, Refills: 5    Associated Diagnoses: Paroxysmal atrial fibrillation ([facility])      aspirin (ECOTRIN LOW STRENGTH) 81 mg EC tablet Take 81 mg by mouth daily      Blood Glucose Monitoring Suppl (ONE TOUCH ULTRA MINI) w/Device KIT Use as directed  Refills: 0      Breo Ellipta 200-25 MCG/ACT inhaler Inhale 1 puff daily Rinse mouth after use.  Qty: 60 each, Refills: 3    Associated Diagnoses: COPD (chronic obstructive pulmonary disease) ([facility])      ferrous sulfate 325 (65 Fe) mg tablet Take 1 tablet (325 mg total) by mouth daily with breakfast  Qty: 60 tablet, Refills: 5    Associated Diagnoses: Alcohol abuse      folic acid (FOLVITE) 1 mg tablet TAKE 1 TABLET DAILY  Qty: 90 tablet, Refills: 2    Associated Diagnoses: Alcohol abuse      gabapentin (NEURONTIN) 300 mg capsule TAKE ONE CAPSULE THREE TIMES DAILY  Qty: 90 capsule, Refills: 1    Associated Diagnoses: Edema, spinal cord ([facility])      lisinopril (ZESTRIL) 20 mg tablet Take 0.5 tablets (10 mg total) by mouth daily  Qty: 60 tablet, Refills: 0    Associated Diagnoses: Hypertension; Hx of CABG      magnesium Oxide (MAG-OX) 400 mg TABS TAKE 1 TABLET TWO TIMES A DAY  Qty: 60 tablet, Refills: 2    Associated Diagnoses: Alcohol abuse      metFORMIN (GLUCOPHAGE) 500 mg tablet Take 1 tablet (500 mg total) by mouth daily with breakfast Do not start before September 23, 2023.  Refills: 0    Associated Diagnoses: Type 2 diabetes mellitus with hyperosmolarity without coma, without long-term current use of insulin ([facility])      metoprolol tartrate (LOPRESSOR) 25 mg tablet Take 1 tablet (25 mg total) by mouth every 12 (twelve) hours  Qty: 60 tablet, Refills: 0    Associated Diagnoses: Paroxysmal atrial fibrillation ([facility])      naltrexone (REVIA) 50 mg tablet Take 1 tablet (50 mg total) by mouth daily  Qty: 30 tablet, Refills: 2    Associated  Diagnoses: Alcohol intoxication (HCC); Alcohol abuse      pantoprazole (PROTONIX) 40 mg tablet Take 1 tablet (40 mg total) by mouth 2 (two) times a day  Qty: 60 tablet, Refills: 1    Associated Diagnoses: Gastroesophageal reflux disease, unspecified whether esophagitis present      pravastatin (PRAVACHOL) 40 mg tablet TAKE 1 TABLET DAILY WITH DINNER  Qty: 90 tablet, Refills: 2    Associated Diagnoses: Essential (primary) hypertension      ranolazine (RANEXA) 500 mg 12 hr tablet TAKE 1 TABLET EVERY 12 HOURS  Qty: 60 tablet, Refills: 3    Associated Diagnoses: Essential (primary) hypertension      sodium chloride 1 g tablet Take 2 tablets (2 g total) by mouth 3 (three) times a day  Qty: 270 tablet, Refills: 3    Associated Diagnoses: Hyponatremia      tamsulosin (FLOMAX) 0.4 mg TAKE 1 CAPSULE DAILY  Qty: 90 capsule, Refills: 1    Associated Diagnoses: Hx of prostatic malignancy      Thiamine HCl (vitamin B-1) 100 MG TABS TAKE 1 TABLET DAILY  Qty: 90 tablet, Refills: 0    Associated Diagnoses: Alcohol abuse      varenicline (CHANTIX) 1 mg tablet TAKE 1 TABLET 2 TIMES A DAY  Qty: 56 tablet, Refills: 1    Associated Diagnoses: Nicotine abuse      Blood Pressure Monitoring (Adult Blood Pressure Cuff Lg) KIT Use in the morning  Qty: 1 kit, Refills: 0    Associated Diagnoses: Hypertension      nicotine (NICODERM CQ) 21 mg/24 hr TD 24 hr patch Place 1 patch on the skin over 24 hours daily Do not start before December 4, 2023.  Qty: 28 patch, Refills: 0    Associated Diagnoses: Nicotine abuse      ONETOUCH DELICA LANCETS FINE MISC 3 (three) times a day Test  Refills: 0      polyethylene glycol (COLYTE) 4000 mL solution Take 4,000 mL by mouth once for 1 dose Take 4000 mL by mouth once for 1 dose. Use as directed  Qty: 4000 mL, Refills: 0    Associated Diagnoses: Colon cancer screening             No discharge procedures on file.    PDMP Review         Value Time User    PDMP Reviewed  Yes 7/26/2021  4:50 PM Donte Clark MD             ED Provider  Electronically Signed by             Franc Juarez DO  01/22/24 5923

## 2024-01-23 LAB
ALBUMIN SERPL BCP-MCNC: 3.5 G/DL (ref 3.5–5)
ALP SERPL-CCNC: 69 U/L (ref 34–104)
ALT SERPL W P-5'-P-CCNC: 99 U/L (ref 7–52)
ANION GAP SERPL CALCULATED.3IONS-SCNC: 12 MMOL/L
AST SERPL W P-5'-P-CCNC: 150 U/L (ref 13–39)
BILIRUB SERPL-MCNC: 0.94 MG/DL (ref 0.2–1)
BUN SERPL-MCNC: 48 MG/DL (ref 5–25)
CALCIUM SERPL-MCNC: 8.2 MG/DL (ref 8.4–10.2)
CHLORIDE SERPL-SCNC: 100 MMOL/L (ref 96–108)
CO2 SERPL-SCNC: 23 MMOL/L (ref 21–32)
CREAT SERPL-MCNC: 1.47 MG/DL (ref 0.6–1.3)
FLUAV RNA RESP QL NAA+PROBE: NEGATIVE
FLUBV RNA RESP QL NAA+PROBE: NEGATIVE
GFR SERPL CREATININE-BSD FRML MDRD: 50 ML/MIN/1.73SQ M
GLUCOSE SERPL-MCNC: 111 MG/DL (ref 65–140)
GLUCOSE SERPL-MCNC: 117 MG/DL (ref 65–140)
GLUCOSE SERPL-MCNC: 174 MG/DL (ref 65–140)
INR PPP: 1.08 (ref 0.84–1.19)
MAGNESIUM SERPL-MCNC: 1.6 MG/DL (ref 1.9–2.7)
PHOSPHATE SERPL-MCNC: 3.8 MG/DL (ref 2.3–4.1)
POTASSIUM SERPL-SCNC: 4.2 MMOL/L (ref 3.5–5.3)
PROT SERPL-MCNC: 6.3 G/DL (ref 6.4–8.4)
PROTHROMBIN TIME: 14.2 SECONDS (ref 11.6–14.5)
RSV RNA RESP QL NAA+PROBE: NEGATIVE
SARS-COV-2 RNA RESP QL NAA+PROBE: NEGATIVE
SODIUM SERPL-SCNC: 135 MMOL/L (ref 135–147)
T4 FREE SERPL-MCNC: 0.84 NG/DL (ref 0.61–1.12)
TSH SERPL DL<=0.05 MIU/L-ACNC: 0.42 UIU/ML (ref 0.45–4.5)

## 2024-01-23 PROCEDURE — 94660 CPAP INITIATION&MGMT: CPT

## 2024-01-23 PROCEDURE — 94640 AIRWAY INHALATION TREATMENT: CPT

## 2024-01-23 PROCEDURE — 80053 COMPREHEN METABOLIC PANEL: CPT

## 2024-01-23 PROCEDURE — 82948 REAGENT STRIP/BLOOD GLUCOSE: CPT

## 2024-01-23 PROCEDURE — 84100 ASSAY OF PHOSPHORUS: CPT

## 2024-01-23 PROCEDURE — 84439 ASSAY OF FREE THYROXINE: CPT

## 2024-01-23 PROCEDURE — 0241U HB NFCT DS VIR RESP RNA 4 TRGT: CPT

## 2024-01-23 PROCEDURE — 99223 1ST HOSP IP/OBS HIGH 75: CPT | Performed by: INTERNAL MEDICINE

## 2024-01-23 PROCEDURE — 84443 ASSAY THYROID STIM HORMONE: CPT

## 2024-01-23 PROCEDURE — 94760 N-INVAS EAR/PLS OXIMETRY 1: CPT

## 2024-01-23 PROCEDURE — 85610 PROTHROMBIN TIME: CPT

## 2024-01-23 PROCEDURE — 83735 ASSAY OF MAGNESIUM: CPT

## 2024-01-23 RX ORDER — LORAZEPAM 1 MG/1
2 TABLET ORAL ONCE
Status: COMPLETED | OUTPATIENT
Start: 2024-01-23 | End: 2024-01-23

## 2024-01-23 RX ORDER — MAGNESIUM SULFATE HEPTAHYDRATE 40 MG/ML
2 INJECTION, SOLUTION INTRAVENOUS ONCE
Status: COMPLETED | OUTPATIENT
Start: 2024-01-23 | End: 2024-01-23

## 2024-01-23 RX ORDER — ONDANSETRON 4 MG/1
4 TABLET, ORALLY DISINTEGRATING ORAL EVERY 6 HOURS PRN
Status: DISCONTINUED | OUTPATIENT
Start: 2024-01-23 | End: 2024-01-24

## 2024-01-23 RX ORDER — IPRATROPIUM BROMIDE AND ALBUTEROL SULFATE 2.5; .5 MG/3ML; MG/3ML
3 SOLUTION RESPIRATORY (INHALATION) EVERY 6 HOURS PRN
Status: DISCONTINUED | OUTPATIENT
Start: 2024-01-23 | End: 2024-01-27 | Stop reason: HOSPADM

## 2024-01-23 RX ORDER — GUAIFENESIN 100 MG/5ML
200 SOLUTION ORAL EVERY 4 HOURS PRN
Status: DISCONTINUED | OUTPATIENT
Start: 2024-01-23 | End: 2024-01-25

## 2024-01-23 RX ADMIN — RANOLAZINE 500 MG: 500 TABLET, EXTENDED RELEASE ORAL at 19:22

## 2024-01-23 RX ADMIN — LORAZEPAM 2 MG: 1 TABLET ORAL at 12:56

## 2024-01-23 RX ADMIN — LORAZEPAM 2 MG: 1 TABLET ORAL at 14:04

## 2024-01-23 RX ADMIN — NALTREXONE HYDROCHLORIDE 50 MG: 50 TABLET, FILM COATED ORAL at 09:28

## 2024-01-23 RX ADMIN — PANTOPRAZOLE SODIUM 40 MG: 40 TABLET, DELAYED RELEASE ORAL at 09:26

## 2024-01-23 RX ADMIN — SODIUM CHLORIDE 2 G: 1 TABLET ORAL at 09:26

## 2024-01-23 RX ADMIN — FOLIC ACID 1 MG: 1 TABLET ORAL at 09:27

## 2024-01-23 RX ADMIN — Medication 400 MG: at 17:28

## 2024-01-23 RX ADMIN — TAMSULOSIN HYDROCHLORIDE 0.4 MG: 0.4 CAPSULE ORAL at 09:27

## 2024-01-23 RX ADMIN — LORAZEPAM 2 MG: 1 TABLET ORAL at 10:32

## 2024-01-23 RX ADMIN — LORAZEPAM 2 MG: 1 TABLET ORAL at 08:10

## 2024-01-23 RX ADMIN — DEXTROSE AND SODIUM CHLORIDE 75 ML/HR: 5; .9 INJECTION, SOLUTION INTRAVENOUS at 09:19

## 2024-01-23 RX ADMIN — METOPROLOL TARTRATE 25 MG: 25 TABLET, FILM COATED ORAL at 20:41

## 2024-01-23 RX ADMIN — GUAIFENESIN 600 MG: 600 TABLET, EXTENDED RELEASE ORAL at 20:41

## 2024-01-23 RX ADMIN — GABAPENTIN 300 MG: 300 CAPSULE ORAL at 09:27

## 2024-01-23 RX ADMIN — PANTOPRAZOLE SODIUM 40 MG: 40 TABLET, DELAYED RELEASE ORAL at 20:41

## 2024-01-23 RX ADMIN — LORAZEPAM 2 MG: 1 TABLET ORAL at 19:22

## 2024-01-23 RX ADMIN — GUAIFENESIN 600 MG: 600 TABLET, EXTENDED RELEASE ORAL at 09:27

## 2024-01-23 RX ADMIN — SODIUM CHLORIDE 2 G: 1 TABLET ORAL at 20:59

## 2024-01-23 RX ADMIN — ASPIRIN 81 MG CHEWABLE TABLET 81 MG: 81 TABLET CHEWABLE at 09:26

## 2024-01-23 RX ADMIN — GABAPENTIN 300 MG: 300 CAPSULE ORAL at 17:28

## 2024-01-23 RX ADMIN — ONDANSETRON 4 MG: 4 TABLET, ORALLY DISINTEGRATING ORAL at 14:04

## 2024-01-23 RX ADMIN — APIXABAN 5 MG: 5 TABLET, FILM COATED ORAL at 17:28

## 2024-01-23 RX ADMIN — DILTIAZEM HYDROCHLORIDE 7.5 MG/HR: 5 INJECTION INTRAVENOUS at 19:33

## 2024-01-23 RX ADMIN — FERROUS SULFATE TAB 325 MG (65 MG ELEMENTAL FE) 325 MG: 325 (65 FE) TAB at 09:27

## 2024-01-23 RX ADMIN — RANOLAZINE 500 MG: 500 TABLET, EXTENDED RELEASE ORAL at 09:27

## 2024-01-23 RX ADMIN — APIXABAN 5 MG: 5 TABLET, FILM COATED ORAL at 09:27

## 2024-01-23 RX ADMIN — Medication 400 MG: at 09:27

## 2024-01-23 RX ADMIN — MAGNESIUM SULFATE HEPTAHYDRATE 2 G: 40 INJECTION, SOLUTION INTRAVENOUS at 09:27

## 2024-01-23 RX ADMIN — LORAZEPAM 2 MG: 1 TABLET ORAL at 21:48

## 2024-01-23 RX ADMIN — FLUTICASONE FUROATE AND VILANTEROL TRIFENATATE 1 PUFF: 200; 25 POWDER RESPIRATORY (INHALATION) at 09:28

## 2024-01-23 RX ADMIN — GABAPENTIN 300 MG: 300 CAPSULE ORAL at 20:59

## 2024-01-23 RX ADMIN — METOPROLOL TARTRATE 25 MG: 25 TABLET, FILM COATED ORAL at 09:26

## 2024-01-23 RX ADMIN — SODIUM CHLORIDE 2 G: 1 TABLET ORAL at 17:28

## 2024-01-23 RX ADMIN — Medication 1 TABLET: at 09:26

## 2024-01-23 RX ADMIN — THIAMINE HCL TAB 100 MG 100 MG: 100 TAB at 09:27

## 2024-01-23 RX ADMIN — IPRATROPIUM BROMIDE AND ALBUTEROL SULFATE 3 ML: 2.5; .5 SOLUTION RESPIRATORY (INHALATION) at 12:03

## 2024-01-23 RX ADMIN — NICOTINE 1 PATCH: 14 PATCH, EXTENDED RELEASE TRANSDERMAL at 09:26

## 2024-01-23 NOTE — ASSESSMENT & PLAN NOTE
POA Na 135, BL in high 120s to low 130s since 2021. Pt has chronic hyponatremia in setting of chronic alcohol use, poor oral intake and possible SIADH. He follows with Dr. Dugan, nephro outpatient.  Etiology likely alcohol abuse/low solute intake  Continue outpatient regimen of salt tablets 2g TID

## 2024-01-23 NOTE — ASSESSMENT & PLAN NOTE
Pt presented to ER with weakness, tachycardia and palpitations secondary to alcohol abuse. Pt has not taken home Lopressor and Eliquis since one week. EKG on admission showed Afib with RVR; likely due to medication on compliance. Started on cardizem drip in the ER.   Most likely secondary to alcohol withdrawal  HR much improved after phenobarbital and cardizem drip was DC  [id number] stroke risk score 3 points     Plan  Control symptoms of alcohol withdrawal as above which is likely  of uncontrolled Afib Cardiology consult  Continue with Lopressor 25 mg BID  Eliquis on hold due to thrombocytopenia  Monitored on telemetry

## 2024-01-23 NOTE — ASSESSMENT & PLAN NOTE
Patient with hx of CAD s/p CABG on Eliquis 5 mg BID at home.   Home meds: Aspirin 81 mg, Eliquis 5 mg BID, Pravastatin 40 mg, Ranolazine 500mg    Plan  Eliquis on hold 2/2 low PLTs and risk of falls  Continue with aspirin, metoprolol, statin, ranolzine

## 2024-01-23 NOTE — ASSESSMENT & PLAN NOTE
Lab Results   Component Value Date    EGFR 83 01/27/2024    EGFR 96 01/26/2024    EGFR 95 01/25/2024    CREATININE 0.97 01/27/2024    CREATININE 0.79 01/26/2024    CREATININE 0.82 01/25/2024     Cr on admission 1.60. Baseline creatinine around 1 to 1.3. Prerenal secondary to dehydration and alcohol abuse. Patient has had previous admissions with [facility] secondary to alcohol abuse.     Plan  Avoid nephrotoxic medications as able/ dose medications per GFR  Holding Lisinopril for now  I/Os  Daily weights  Monitor renal function

## 2024-01-23 NOTE — UTILIZATION REVIEW
Initial Clinical Review    Admission: Date/Time/Statement:   Admission Orders (From admission, onward)       Ordered        24 1826  INPATIENT ADMISSION  Once                          Orders Placed This Encounter   Procedures    INPATIENT ADMISSION     Standing Status:   Standing     Number of Occurrences:   1     Order Specific Question:   Level of Care     Answer:   Level 2 Stepdown / HOT [14]     Order Specific Question:   Estimated length of stay     Answer:   More than 2 Midnights     Order Specific Question:   Certification     Answer:   I certify that inpatient services are medically necessary for this patient for a duration of greater than two midnights. See H&P and MD Progress Notes for additional information about the patient's course of treatment.     ED Arrival Information       Expected   -    Arrival   2024 16:31    Acuity   Emergent              Means of arrival   Ambulance    Escorted by   [facility]   Family Medicine    Admission type   Emergency              Arrival complaint   etoh             Chief Complaint   Patient presents with    Alcohol Intoxication     Pt arrived EMS. Pt reports that he has been drinking a quart of vodka for two weeks w/o taking his medication or eating. Pt c/o nausea & vomiting. Pt denies chest pain or SOB.        Initial Presentation:  63 yom to ER from home via EMS for generalized weakness. Admits to drinking qt vodka daily x 2 weeks, most recent drink yesterday. He states that he has been drinking because of his  son, who's favorite time of year was during the holidays. Reports vomiting since stopped drinking, mid abd pain. Also reports fall yesterday, hit head, bruise noted R arm. Hx alcohol abuse, CAD, A-fib on anticoagulation, diabetes mellitus type 2, hypertension, COPD, GERD, nicotine dependence, hyponatremia. Hasn't taken Lopressor or Eliquis in week. Presents tachycardic, anxious, tearful, tremors noted. Admission  work-up showing afib with RVR, thrombocytopenia, ENMA, hypomagnesemia, hyponatremia, elevated LFT's, troponin, lipase, ETOH 373.  Admitted to inpatient status for afib with RVR.    Date: 1/23/24   Day 2:   Persistent headache, difficulty feeding self 2nd tremors. CIWA 13-14, requiring Ativan. Monitor mental status, continue Thiamine, folate, MVI, IVF. Remains on IV Cardizem gtt for PAF.    Date: 1/24/    Day 3: Has surpassed a 2nd midnight with active treatments and services, which include IV Cardizem gtt, cardio consult pending. CIWA protocol, IVF, monitor VS, neuro checks, follow labs.  CIWA scores up to 36, 37 this morning, transferred to ICU for level 1 stepdown care        ED Triage Vitals   Temperature Pulse Respirations Blood Pressure SpO2   01/22/24 1651 01/22/24 1645 01/22/24 1645 01/22/24 1645 01/22/24 1645   98.8 °F (37.1 °C) (!) 149 21 134/76 90 %      Temp Source Heart Rate Source Patient Position - Orthostatic VS BP Location FiO2 (%)   01/22/24 1651 01/22/24 1645 01/22/24 1811 01/22/24 1811 --   Oral Monitor Lying Right arm       Pain Score       01/22/24 2100       No Pain          Wt Readings from Last 1 Encounters:   01/24/24 88.9 kg (195 lb 15.8 oz)     Additional Vital Signs:   Date/Time Temp Pulse Resp BP MAP (mmHg) SpO2 Calculated FIO2 (%) - Nasal Cannula Nasal Cannula O2 Flow Rate (L/min) O2 Device Patient Position - Orthostatic VS   01/23/24 08:00:35 98.6 °F (37 °C) 102 17 131/67 88 85 % Abnormal  -- -- -- Lying   01/23/24 0630 -- 89 -- 110/61 77 90 % 28 2 L/min Nasal cannula --   01/23/24 0530 -- 90 -- 111/61 78 90 % -- -- -- --   01/23/24 0430 -- 87 -- 113/61 78 -- -- -- -- --   01/23/24 0330 -- 89 -- 114/62 79 91 % -- -- -- --   01/23/24 0230 -- 86 -- 115/62 80 94 % -- -- -- --   01/23/24 0130 -- 84 -- 104/56 72 92 % -- -- -- --   01/23/24 0030 -- 94 -- 104/55 71 92 % -- -- -- --   01/22/24 23:20:17 -- 120 Abnormal  -- 104/61 75 92 % -- -- -- --   01/22/24 2320 -- 120 Abnormal  -- 104/61 --  -- -- -- -- --   01/22/24 20:17:43 98.2 °F (36.8 °C) 136 Abnormal  18 137/78 98 91 % 28 2 L/min Nasal cannula Lying   01/22/24 1900 -- 128 Abnormal  22 155/77 109 93 % 28 2 L/min Nasal cannula --   01/22/24 1849 -- 141 Abnormal  -- 121/66 -- -- -- -- -- --   01/22/24 1815 -- 116 Abnormal  14 127/73 89 96 % 28 2 L/min Nasal cannula --   01/22/24 1811 -- 132 Abnormal  15 135/84 -- 97 % 28 2 L/min None (Room air) Lying   01/22/24 1655 -- -- -- -- -- 95 % 28 2 L/min Nasal cannula --   01/22/24 1651 98.8 °F (37.1 °C) 117 Abnormal  19 -- -- 87 % Abnormal  -- -- -- --   01/22/24 1645 -- 149 Abnormal  21 134/76 100 90 % -- -- None (Room air) --     Pertinent Labs/Diagnostic Test Results:   CT head without contrast   Final Result  (01/22 1740)      No acute intracranial abnormality.         XR chest 1 view portable   ED Interpretation (01/22 1750)   cardiomegaly        1/22 EKG=  Rhythm: atrial fibrillation    Ectopy:     Ectopy: none    QRS:     QRS axis:  Normal   Conduction:     Conduction: normal    ST segments:     ST segments:  Normal   T waves:     T waves: normal     Results from last 7 days   Lab Units 01/24/24  0756 01/22/24  1653   WBC Thousand/uL 4.62 4.97   HEMOGLOBIN g/dL 12.3 15.6   HEMATOCRIT % 35.5* 43.7   PLATELETS Thousands/uL 21* 50*   NEUTROS ABS Thousands/µL 3.22 3.27     Results from last 7 days   Lab Units 01/24/24  0756 01/24/24  0444 01/23/24  0459 01/22/24  1653   SODIUM mmol/L 135  --  135 135   POTASSIUM mmol/L 4.2  --  4.2 4.2   CHLORIDE mmol/L 99  --  100 93*   CO2 mmol/L 29  --  23 21   ANION GAP mmol/L 7  --  12 21   BUN mg/dL 30*  --  48* 47*   CREATININE mg/dL 1.04  --  1.47* 1.60*   EGFR ml/min/1.73sq m 77  --  50 45   CALCIUM mg/dL 9.0  --  8.2* 8.6   MAGNESIUM mg/dL 1.2*  --  1.6* 1.4*   PHOSPHORUS mg/dL  --  1.7* 3.8  --      Results from last 7 days   Lab Units 01/24/24  0756 01/23/24  0459 01/22/24  1653   AST U/L 92* 150* 247*   ALT U/L 68* 99* 142*   ALK PHOS U/L 73 69 90   TOTAL  PROTEIN g/dL 6.5 6.3* 7.8   ALBUMIN g/dL 3.5 3.5 4.4   TOTAL BILIRUBIN mg/dL 0.95 0.94 1.04*     Results from last 7 days   Lab Units 01/23/24  1132 01/23/24  0745 01/22/24 2049 01/22/24  1641   POC GLUCOSE mg/dl 174* 117 184* 74     Results from last 7 days   Lab Units 01/24/24  0756 01/23/24  0459 01/22/24  1653   GLUCOSE RANDOM mg/dL 114 111 76     BETA-HYDROXYBUTYRATE   Date Value Ref Range Status   01/22/2024 3.2 (H) <0.6 mmol/L Final      Results from last 7 days   Lab Units 01/22/24  1653   PH HALIE  7.362   PCO2 HALIE mm Hg 42.7   PO2 HALIE mm Hg 59.4*   HCO3 HALIE mmol/L 23.7*   BASE EXC HALIE mmol/L -1.8   O2 CONTENT HALIE ml/dL 19.1   O2 HGB, VENOUS % 84.2*     Results from last 7 days   Lab Units 01/22/24  2318 01/22/24 2122 01/22/24  1653   HS TNI 0HR ng/L  --   --  137*   HS TNI 2HR ng/L  --  110*  --    HSTNI D2 ng/L  --  -27  --    HS TNI 4HR ng/L 105*  --   --    HSTNI D4 ng/L -32  --   --      Results from last 7 days   Lab Units 01/23/24  0459   TSH 3RD GENERATON uIU/mL 0.424*     Results from last 7 days   Lab Units 01/22/24 2122   LACTIC ACID mmol/L 1.2     Results from last 7 days   Lab Units 01/24/24  0444 01/22/24  1653   LIPASE u/L 74 122*     Results from last 7 days   Lab Units 01/22/24  1808   CLARITY UA  Slightly Cloudy   COLOR UA  Yellow   SPEC GRAV UA  1.025   PH UA  6.0   GLUCOSE UA mg/dl Negative   KETONES UA mg/dl 15 (1+)*   BLOOD UA  Small*   PROTEIN UA mg/dl 100 (2+)*   NITRITE UA  Negative   BILIRUBIN UA  Small*   UROBILINOGEN UA E.U./dl 1.0   LEUKOCYTES UA  Negative   WBC UA /hpf 0-1   RBC UA /hpf 0-1   BACTERIA UA /hpf Moderate*   EPITHELIAL CELLS WET PREP /hpf Occasional     Results from last 7 days   Lab Units 01/22/24  1808   AMPH/METH  Negative   BARBITURATE UR  Negative   BENZODIAZEPINE UR  Negative   COCAINE UR  Negative   METHADONE URINE  Negative   OPIATE UR  Negative   PCP UR  Negative   THC UR  Negative     Results from last 7 days   Lab Units 01/22/24  1653   ETHANOL LVL  mg/dL 373*     ED Treatment:   Medication Administration from 01/22/2024 1630 to 01/22/2024 2006         Date/Time Order Dose Route Action     01/22/2024 1647 EST diltiazem (CARDIZEM) injection 15 mg 15 mg Intravenous Given     01/22/2024 1647 EST ondansetron (ZOFRAN) injection 4 mg 4 mg Intravenous Given     01/22/2024 1650 EST lactated ringers bolus 1,000 mL 1,000 mL Intravenous New Bag     01/22/2024 1758 EST magnesium sulfate 2 g/50 mL IVPB (premix) 2 g 2 g Intravenous New Bag     01/22/2024 1812 EST diltiazem (CARDIZEM) injection 15 mg 15 mg Intravenous Given     01/22/2024 1944 EST diltiazem (CARDIZEM) 125 mg in sodium chloride 0.9 % 125 mL infusion 5 mg/hr Intravenous Rate/Dose Change     01/22/2024 1849 EST diltiazem (CARDIZEM) 125 mg in sodium chloride 0.9 % 125 mL infusion 2.5 mg/hr Intravenous New Bag     01/22/2024 1855 EST LORazepam (ATIVAN) injection 2 mg 2 mg Intravenous Given          Past Medical History:   Diagnosis Date    Acute on chronic kidney failure      Alcohol withdrawal (HCC) 06/07/2019    Atrial fibrillation (HCC)     Cancer (HCC)     prostate ca,had radiation    Cardiac disease     stents,then triple bypass    COPD (chronic obstructive pulmonary disease) (HCC)     Coronary artery disease     ETOH abuse     Heart failure (HCC)     History of heart surgery     [facility]    Hx of heart artery stent     2014    Hyperlipidemia     Hypertension     Hypovolemic shock (HCC) 12/22/2019    Lumbar spondylitis ([facility]) 10/13/2022    Nasal bone fracture 10/10/2022    Prostate CA ([facility])     S/P CABG x 3     2004    Sleep apnea      Present on Admission:   Hyponatremia   COPD (chronic obstructive pulmonary disease) (HCC)   Hypomagnesemia   Alcohol intoxication (HCC)   Acute kidney injury superimposed on CKD    CAD (coronary artery disease)   Type 2 diabetes mellitus (HCC)   Thrombocytopenia (HCC)   Essential hypertension   Nicotine abuse   Atrial fibrillation with rapid ventricular  response (HCC)   GERD (gastroesophageal reflux disease)    Admitting Diagnosis: Hypomagnesemia [E83.42]  Alcohol intoxication (HCC) [F10.929]  Acute alcohol intoxication (HCC) [F10.929]  Elevated troponin [R79.89]  ENMA (acute kidney injury) ([facility]) [N17.9]  Atrial fibrillation with rapid ventricular response ([facility]) [I48.91]  Age/Sex: 63 y.o. male  Admission Orders:  CIWA protocol  Cont pulse ox  Seizure precautions  Pt eval & tx  Accuchecks  Telemetry    Scheduled Medications:  apixaban, 5 mg, Oral, BID  aspirin, 81 mg, Oral, Daily  ferrous sulfate, 325 mg, Oral, Daily With Breakfast  fluticasone-vilanterol, 1 puff, Inhalation, Daily  folic acid, 1 mg, Oral, Daily  gabapentin, 300 mg, Oral, TID  guaiFENesin, 600 mg, Oral, Q12H SEDA  insulin lispro, 1-6 Units, Subcutaneous, 4x Daily (AC & HS)  magnesium Oxide, 400 mg, Oral, BID  magnesium sulfate, 2 g, Intravenous, Once  metoprolol tartrate, 25 mg, Oral, Q12H SEDA  multivitamin-minerals, 1 tablet, Oral, Daily  naltrexone, 50 mg, Oral, Daily  nicotine, 1 patch, Transdermal, Daily  pantoprazole, 40 mg, Oral, BID  ranolazine, 500 mg, Oral, Q12H  sodium chloride, 2 g, Oral, TID  tamsulosin, 0.4 mg, Oral, Daily  vitamin B-1, 100 mg, Oral, Daily  varenicline, 1 mg, Oral, BID    Continuous IV Infusions:  dextrose 5 % and sodium chloride 0.9 %, 75 mL/hr, Intravenous, Continuous  diltiazem, 1-15 mg/hr, Intravenous, Titrated    PRN Meds:  albuterol, 2 puff, Inhalation, Q4H PRN  calcium carbonate, 1,000 mg, Oral, Daily PRN    Network Utilization Review Department  ATTENTION: Please call with any questions or concerns to [phone] and carefully listen to the prompts so that you are directed to the right person. All voicemails are confidential.   For Discharge needs, contact Care Management DC Support Team at [phone] opt. 2  Send all requests for admission clinical reviews, approved or denied determinations and any other requests to dedicated fax number below belonging to the  [place] where the patient is receiving treatment. List of dedicated fax numbers for the Facilities:  FACILITY NAME UR FAX NUMBER   ADMISSION DENIALS (Administrative/Medical Necessity) [phone]   DISCHARGE SUPPORT TEAM (NETWORK) [phone]   PARENT CHILD HEALTH (Maternity/NICU/Pediatrics) [phone]   [facility] [phone]   [facility] [phone]   [facility] [phone]   [facility] [phone]   [facility] [phone]   [facility] [phone]   [facility] [phone]   [facility] [phone]   [facility] [phone]   [facility] [phone]   [facility] [phone]

## 2024-01-23 NOTE — PLAN OF CARE
Problem: PAIN - ADULT  Goal: Verbalizes/displays adequate comfort level or baseline comfort level  Description: Interventions:  - Encourage patient to monitor pain and request assistance  - Assess pain using appropriate pain scale  - Administer analgesics based on type and severity of pain and evaluate response  - Implement non-pharmacological measures as appropriate and evaluate response  - Consider cultural and social influences on pain and pain management  - Notify physician/advanced practitioner if interventions unsuccessful or patient reports new pain  Outcome: Progressing     Problem: INFECTION - ADULT  Goal: Absence or prevention of progression during hospitalization  Description: INTERVENTIONS:  - Assess and monitor for signs and symptoms of infection  - Monitor lab/diagnostic results  - Monitor all insertion sites, i.e. indwelling lines, tubes, and drains  - Monitor endotracheal if appropriate and nasal secretions for changes in amount and color  - Venus appropriate cooling/warming therapies per order  - Administer medications as ordered  - Instruct and encourage patient and family to use good hand hygiene technique  - Identify and instruct in appropriate isolation precautions for identified infection/condition  Outcome: Progressing     Problem: SAFETY ADULT  Goal: Patient will remain free of falls  Description: INTERVENTIONS:  - Educate patient/family on patient safety including physical limitations  - Instruct patient to call for assistance with activity   - Consult OT/PT to assist with strengthening/mobility   - Keep Call bell within reach  - Keep bed low and locked with side rails adjusted as appropriate  - Keep care items and personal belongings within reach  - Initiate and maintain comfort rounds  - Make Fall Risk Sign visible to staff  - Offer Toileting every 2 Hours, in advance of need  - Initiate/Maintain bed alarm  - Obtain necessary fall risk management equipment:   - Apply yellow socks and  bracelet for high fall risk patients  - Consider moving patient to room near nurses station  Outcome: Progressing     Problem: SAFETY ADULT  Goal: Maintain or return to baseline ADL function  Description: INTERVENTIONS:  -  Assess patient's ability to carry out ADLs; assess patient's baseline for ADL function and identify physical deficits which impact ability to perform ADLs (bathing, care of mouth/teeth, toileting, grooming, dressing, etc.)  - Assess/evaluate cause of self-care deficits   - Assess range of motion  - Assess patient's mobility; develop plan if impaired  - Assess patient's need for assistive devices and provide as appropriate  - Encourage maximum independence but intervene and supervise when necessary  - Involve family in performance of ADLs  - Assess for home care needs following discharge   - Consider OT consult to assist with ADL evaluation and planning for discharge  - Provide patient education as appropriate  Outcome: Progressing     Problem: SAFETY ADULT  Goal: Maintains/Returns to pre admission functional level  Description: INTERVENTIONS:  - Perform AM-PAC 6 Click Basic Mobility/ Daily Activity assessment daily.  - Set and communicate daily mobility goal to care team and patient/family/caregiver.   - Collaborate with rehabilitation services on mobility goals if consulted  - Perform Range of Motion 3 times a day.  - Actively turns self.  - Dangle patient 3 times a day  - Stand patient 3 times a day  - Ambulate patient 3 times a day  - Out of bed to chair 3 times a day   - Out of bed for meals 3 times a day  - Out of bed for toileting  - Record patient progress and toleration of activity level   Outcome: Progressing     Problem: DISCHARGE PLANNING  Goal: Discharge to home or other facility with appropriate resources  Description: INTERVENTIONS:  - Identify barriers to discharge w/patient and caregiver  - Arrange for needed discharge resources and transportation as appropriate  - Identify  discharge learning needs (meds, wound care, etc.)  - Arrange for interpretive services to assist at discharge as needed  - Refer to Case Management Department for coordinating discharge planning if the patient needs post-hospital services based on physician/advanced practitioner order or complex needs related to functional status, cognitive ability, or social support system  Outcome: Progressing     Problem: Knowledge Deficit  Goal: Patient/family/caregiver demonstrates understanding of disease process, treatment plan, medications, and discharge instructions  Description: Complete learning assessment and assess knowledge base.  Interventions:  - Provide teaching at level of understanding  - Provide teaching via preferred learning methods  Outcome: Progressing     Problem: NEUROSENSORY - ADULT  Goal: Achieves maximal functionality and self care  Description: INTERVENTIONS  - Monitor swallowing and airway patency with patient fatigue and changes in neurological status  - Encourage and assist patient to increase activity and self care.   - Encourage visually impaired, hearing impaired and aphasic patients to use assistive/communication devices  Outcome: Progressing     Problem: CARDIOVASCULAR - ADULT  Goal: Maintains optimal cardiac output and hemodynamic stability  Description: INTERVENTIONS:  - Monitor I/O, vital signs and rhythm  - Monitor for S/S and trends of decreased cardiac output  - Administer and titrate ordered vasoactive medications to optimize hemodynamic stability  - Assess quality of pulses, skin color and temperature  - Assess for signs of decreased coronary artery perfusion  - Instruct patient to report change in severity of symptoms  Outcome: Progressing     Problem: CARDIOVASCULAR - ADULT  Goal: Absence of cardiac dysrhythmias or at baseline rhythm  Description: INTERVENTIONS:  - Continuous cardiac monitoring, vital signs, obtain 12 lead EKG if ordered  - Administer antiarrhythmic and heart rate  control medications as ordered  - Monitor electrolytes and administer replacement therapy as ordered  Outcome: Progressing     Problem: RESPIRATORY - ADULT  Goal: Achieves optimal ventilation and oxygenation  Description: INTERVENTIONS:  - Assess for changes in respiratory status  - Assess for changes in mentation and behavior  - Position to facilitate oxygenation and minimize respiratory effort  - Oxygen administered by appropriate delivery if ordered  - Initiate smoking cessation education as indicated  - Encourage broncho-pulmonary hygiene including cough, deep breathe, Incentive Spirometry  - Assess the need for suctioning and aspirate as needed  - Assess and instruct to report SOB or any respiratory difficulty  - Respiratory Therapy support as indicated  Outcome: Progressing     Problem: GENITOURINARY - ADULT  Goal: Maintains or returns to baseline urinary function  Description: INTERVENTIONS:  - Assess urinary function  - Encourage oral fluids to ensure adequate hydration if ordered  - Administer IV fluids as ordered to ensure adequate hydration  - Administer ordered medications as needed  - Offer frequent toileting  - Follow urinary retention protocol if ordered  Outcome: Progressing     Problem: METABOLIC, FLUID AND ELECTROLYTES - ADULT  Goal: Electrolytes maintained within normal limits  Description: INTERVENTIONS:  - Monitor labs and assess patient for signs and symptoms of electrolyte imbalances  - Administer electrolyte replacement as ordered  - Monitor response to electrolyte replacements, including repeat lab results as appropriate  - Instruct patient on fluid and nutrition as appropriate  Outcome: Progressing     Problem: METABOLIC, FLUID AND ELECTROLYTES - ADULT  Goal: Fluid balance maintained  Description: INTERVENTIONS:  - Monitor labs   - Monitor I/O and WT  - Instruct patient on fluid and nutrition as appropriate  - Assess for signs & symptoms of volume excess or deficit  Outcome: Progressing      Problem: METABOLIC, FLUID AND ELECTROLYTES - ADULT  Goal: Glucose maintained within target range  Description: INTERVENTIONS:  - Monitor Blood Glucose as ordered  - Assess for signs and symptoms of hyperglycemia and hypoglycemia  - Administer ordered medications to maintain glucose within target range  - Assess nutritional intake and initiate nutrition service referral as needed  Outcome: Progressing     Problem: SKIN/TISSUE INTEGRITY - ADULT  Goal: Skin Integrity remains intact(Skin Breakdown Prevention)  Description: Assess:  -Perform Herrera assessment every shift.  -Clean and moisturize skin every shift.  -Inspect skin when repositioning, toileting, and assisting with ADLS  -Assess extremities for adequate circulation and sensation     Bed Management:  -Have minimal linens on bed & keep smooth, unwrinkled  -Change linens as needed when moist or perspiring  -Avoid sitting or lying in one position for more than 2 hours while in bed  -Keep HOB at 30 degrees     Toileting:  -Offer bedside commode  -Assess for incontinence every shift.  -Use incontinent care products after each incontinent episode such as protective barrier    Activity:  -Mobilize patient 3 times a day  -Encourage activity and walks on unit  -Encourage or provide ROM exercises   -Actively turns self.  -Use appropriate equipment to lift or move patient in bed  -Instruct/ Assist with weight shifting every 2 hours when out of bed in chair  -Consider limitation of chair time 2 hour intervals    Skin Care:  -Avoid use of baby powder, tape, friction and shearing, hot water or constrictive clothing  -Relieve pressure over bony prominences using pillows.  -Do not massage red bony areas    Outcome: Progressing     Problem: MUSCULOSKELETAL - ADULT  Goal: Maintain or return mobility to safest level of function  Description: INTERVENTIONS:  - Assess patient's ability to carry out ADLs; assess patient's baseline for ADL function and identify physical deficits  which impact ability to perform ADLs (bathing, care of mouth/teeth, toileting, grooming, dressing, etc.)  - Assess/evaluate cause of self-care deficits   - Assess range of motion  - Assess patient's mobility  - Assess patient's need for assistive devices and provide as appropriate  - Encourage maximum independence but intervene and supervise when necessary  - Involve family in performance of ADLs  - Assess for home care needs following discharge   - Consider OT consult to assist with ADL evaluation and planning for discharge  - Provide patient education as appropriate  Outcome: Progressing

## 2024-01-23 NOTE — ASSESSMENT & PLAN NOTE
Acute on chronic, plts on admission 50. Baseline in the low 100s. Secondary to alcohol abuse and long-term use of eliquis.     Plan  PLT count now uprending  Repeat CBC in 3 days  Continue to hold eliquis

## 2024-01-23 NOTE — CONSULTS
Consultation - Cardiology   Gregg Partida 63 y.o. male MRN: [id number]  Unit/Bed#: [address] [place] [address] Encounter: [id number]    Assessment/Plan     Atrial fibrillation with RVR   Tachycardic and irregular rhythm   Started on Cardizem drip by primary team   Restarted home Lopressor 25 mg BID as patient had not been taking it   Recent TTE from 9/22/23 showed LVEF 62%, diastolic function moderately abnormal, consistent with grade 2 abnormal relaxation   Continue Cardizem drip, titrate as needed/tolerated.   Telemetry monitoring   No cardioversion or JENNY at this time due to acute alcohol intoxication/withdrawals     Elevated troponin   Down trending 137 -> 110 -> 105  Likely secondary to acute withdrawals   Continue to monitor patient for chest pain    Alcohol intoxication    Has been drinking quart of vodka daily for past 2 weeks. Alcohol level 313 on admission   CIWA protocol, seizure precautions, monitor mental status   Vitamin and electrolyte supplementation   IV fluids D5 in NS   Continue care as per primary team     CAD   Hx of CAD s/p CABG on Eliquis 5 mg BID, Aspirin 81 mg, Pravastatin 40 mg at home. Has not taken meds for at least 1 week   Continue home Eliquis and aspirin  Continue holding pravastatin due to elevated liver enzymes     Electrolyte disturbances   Hypomagnesemia   Hyponatremia   Continue to monitor  Replete as need   Salt tablets 2 g TID     Thrombocytopenia   Platelets in 50s on admission, baseline 100s   Secondary to alcohol abuse   Continue Eliquis for now as patient is in A-fib with RVR  Will need to hold anticoagulation if platelet count decreases   Trend CBC and monitor closely   Transfusion if drops <20     Essential hypertension   BP stable throughout admission   Continue holding Lisinopril due to ENMA   Continue home Lopressor 25 mg BID  Continue Cardizem drip titration as tolerated by BP   Monitor vitals per routine     ENMA on CKD  Cr 1.60 on admission, 1-1.3 at baseline   Likely secondary  to alcohol abuse   Avoid nephrotoxic medications, hold Lisinopril   I/Os and daily weights   IVF with D5NS   Trend Cr     COPD  SOB at baseline. Chronic smoker 1 PPD  Not in acute exacerbation, no wheezing or respiratory distress noted on exam   Continue home inhalers (Breo Ellipta)   Continuous pulse ox   Mucinex for cough     Nicotine abuse   Chronic, smokes 1 PPD  Continue nicotine patch, Chantix   Smoking cessation education     Type 2 DM   Chronic, controlled   Care as per primary team   Can continue gabapentin 300 mg TID for diabetic neuropathy       History of Present Illness   Physician Requesting Consult: Irving Nascimento MD  Reason for Consult / Principal Problem: Atrial fibrillation with RVR, Elevated troponin   HPI: Gregg Partida is a 63 y.o. year old male with a PMH of A-fib on anticoagulation with Eliquis, CAD, alcohol abuse, DM2, HTN, COPD, GERD, nicotine dependence, and hyponatremia who presented with weakness secondary to alcohol intoxication. Patient states he was drinking a quart of vodka every day for the past 2 weeks. Patient had been going to family guidance in [place] for the past 2-3 months. The holidays triggered him to start drinking again as it was his  son's favorite time of year. Patient also stopped taking all of his medications during this time and had not eaten in the 4 days prior to admission. He fell while intoxicated the day prior to admission and sustained a bruise on the right arm. During admission, patient has been tachycardic and in A-fib with RVR.         Inpatient consult to Cardiology     Date/Time  2024 9:48 AM     Performed by  Carley Hernandez MD   Authorized by  Bipin Freed MD     [facility] Protocol   Procedure performed by: (Lois Kitchen DO)           Review of Systems   Respiratory:  Positive for shortness of breath.    Cardiovascular:  Positive for palpitations.   Neurological:  Positive for tremors and headaches.   Limited ROS due to acuity  of condition     Historical Information   Past Medical History:   Diagnosis Date    Acute on chronic kidney failure      Alcohol withdrawal (HCC) 06/07/2019    Atrial fibrillation (HCC)     Cancer (HCC)     prostate ca,had radiation    Cardiac disease     stents,then triple bypass    COPD (chronic obstructive pulmonary disease) (HCC)     Coronary artery disease     ETOH abuse     Heart failure (HCC)     History of heart surgery     says triple bypass [facility]    Hx of heart artery stent     2014    Hyperlipidemia     Hypertension     Hypovolemic shock (HCC) 12/22/2019    Lumbar spondylitis (HCC) 10/13/2022    Nasal bone fracture 10/10/2022    Prostate CA (HCC)     S/P CABG x 3     2004    Sleep apnea      Past Surgical History:   Procedure Laterality Date    CARDIAC CATHETERIZATION      2 stents    CORONARY ARTERY BYPASS GRAFT  2004    [place] ARTHRD ANT INTERBODY MIN DSC CRV BELOW C2 N/A 12/16/2020    Procedure: Anterior cervical discectomy with fusion C4-C7; Posterior cervical decompression and fusion C2-T2;  Surgeon: David Rowell MD;  Location: [facility];  Service: Neurosurgery    TONSILLECTOMY       Social History     Substance and Sexual Activity   Alcohol Use Not Currently    Alcohol/week: 4.0 standard drinks of alcohol    Types: 4 Standard drinks or equivalent per week    Comment: quart of vodka daily     Social History     Substance and Sexual Activity   Drug Use No     E-Cigarette/Vaping    E-Cigarette Use Never User      E-Cigarette/Vaping Substances    Nicotine Yes     THC No     CBD No     Flavoring No     Other No     Unknown No      Social History     Tobacco Use   Smoking Status Every Day    Current packs/day: 1.50    Average packs/day: 1.5 packs/day for 40.0 years (60.0 ttl pk-yrs)    Types: Cigarettes   Smokeless Tobacco Never     Family History:   Family History   Problem Relation Age of Onset    Diabetes Mother     Uterine cancer Mother     COPD Father     Hypertension Father   "      Meds/Allergies   all current active meds have been reviewed  No Known Allergies    Objective   Vitals: Blood pressure 131/67, pulse 102, temperature 98.6 °F (37 °C), temperature source Oral, resp. rate 17, height 6' 2\" (1.88 m), SpO2 (!) 85%.  Orthostatic Blood Pressures      Flowsheet Row Most Recent Value   Blood Pressure 131/67 filed at 01/23/2024 0800   Patient Position - Orthostatic VS Lying filed at 01/23/2024 0800              Intake/Output Summary (Last 24 hours) at 1/23/2024 0948  Last data filed at 1/23/2024 0630  Gross per 24 hour   Intake 2215 ml   Output 300 ml   Net 1915 ml       Invasive Devices       Peripheral Intravenous Line  Duration             Peripheral IV 01/22/24 Left Forearm <1 day                    Physical Exam  Constitutional:       General: He is in acute distress.      Appearance: He is ill-appearing.   HENT:      Head: Normocephalic and atraumatic.   Eyes:      General: No scleral icterus.     Extraocular Movements: Extraocular movements intact.      Conjunctiva/sclera: Conjunctivae normal.   Cardiovascular:      Rate and Rhythm: Tachycardia present. Rhythm irregular.      Heart sounds: Murmur heard.   Pulmonary:      Effort: Pulmonary effort is normal. No respiratory distress.      Breath sounds: Normal breath sounds. No wheezing.   Abdominal:      General: Abdomen is flat. Bowel sounds are normal.      Palpations: There is no mass.   Musculoskeletal:      Cervical back: Normal range of motion.     Lab Results: I have personally reviewed pertinent lab results.    CBC with diff:   Results from last 7 days   Lab Units 01/22/24  1653   WBC Thousand/uL 4.97   RBC Million/uL 4.62   HEMOGLOBIN g/dL 15.6   HEMATOCRIT % 43.7   MCV fL 95   MCH pg 33.8   MCHC g/dL 35.7   RDW % 14.3   MPV fL 11.5   PLATELETS Thousands/uL 50*     CMP:   Results from last 7 days   Lab Units 01/23/24  0459   SODIUM mmol/L 135   CHLORIDE mmol/L 100   CO2 mmol/L 23   BUN mg/dL 48*   CREATININE mg/dL 1.47* "   CALCIUM mg/dL 8.2*   AST U/L 150*   ALT U/L 99*   ALK PHOS U/L 69   EGFR ml/min/1.73sq m 50     HS Troponin:   0   Lab Value Date/Time    HSTNI 12 09/21/2023 1011    HSTNI0 137 (H) 01/22/2024 1653    HSTNI2 110 (H) 01/22/2024 2122    HSTNI4 105 (H) 01/22/2024 2318    HSTNI4 31 12/01/2023 0825    HSTNI4 35 10/30/2023 0126    HSTNI4 55 (H) 11/23/2022 1557    HSTNI4 50 (H) 10/18/2022 1241    HSTNI4 97 (H) 04/10/2022 0234     BNP:   Results from last 7 days   Lab Units 01/23/24  0459   POTASSIUM mmol/L 4.2   CHLORIDE mmol/L 100   CO2 mmol/L 23   BUN mg/dL 48*   CREATININE mg/dL 1.47*   CALCIUM mg/dL 8.2*   EGFR ml/min/1.73sq m 50     Coags:   Results from last 7 days   Lab Units 01/23/24  0936   INR  1.08     TSH:   Results from last 7 days   Lab Units 01/23/24  0459   TSH 3RD GENERATON uIU/mL 0.424*     Magnesium:   Results from last 7 days   Lab Units 01/23/24  0459   MAGNESIUM mg/dL 1.6*       Imaging: I have personally reviewed pertinent reports.    EKG: Atrial fibrillation with rapid ventricular response, Rightward axis; ST & T wave abnormality, consider inferior ischemia. Abnormal ECG.   VTE Prophylaxis: Apixaban (Eliquis)     Code Status: Prior  Advance Directive and Living Will:      Power of :    POLST:      Counseling / Coordination of Care  Total floor / unit time spent today 40 minutes.  Greater than 50% of total time was spent with the patient and / or family counseling and / or coordination of care.

## 2024-01-23 NOTE — ASSESSMENT & PLAN NOTE
Lab Results   Component Value Date    HGBA1C 5.4 01/24/2024     Recent Labs     01/26/24  1614 01/26/24 2050 01/27/24  0643 01/27/24  1129   POCGLU 100 77 101 134     Chronic, controlled. Home med includes Metformin 500 mg daily    Plan  Continue metformin  Carb controlled diet  Continue with gabapentin 300 mg TID daily for diabetic neuropathy

## 2024-01-23 NOTE — PROGRESS NOTES
[facility] Progress Note - Gregg Partida 63 y.o. male MRN: [id number]    Unit/Bed#: [address] [place] [address] Encounter: [id number]      Assessment/Plan:  Alcohol intoxication (HCC)  Assessment & Plan  Patient presented to ER with generalized weakness secondary to alcohol use. He has been drinking a quart of vodka every day for the past 2 weeks, most recent drink was yesterday. He has also been vomiting and has mild abdominal pain. He  sustained a fall yesterday while he was intoxicated and has a bruise on right arm. He has not been taking medications since one week.  Patient quit drinking for 1 months however, restarted drinking 2 weeks ago because of his  son, who's favorite time of year was during the holidays. CIWA at the time of presentation 21   Alcohol level 313, AST and  and 142; Beta hydroxy butyrate 3.2, Glucose 76 secondary to alcohol use  On presentation, pt was dehydrated, had tremors, palpitations and sweating; no signs of AMS  In the ER, patient received Ativan 2 mg IV, LR 1000 ml bolus    Plan  Pt placed on CIWA protocol   : CIWA 13-14, requiring Ativan 2 mg x4 so far today  Seizure precautions   Thiamine, folate and multivitamins   IV fluid therapy with 5% dextrose in 0.9% normal saline  Continue with home Naltrexone 50 mg daily  Electrolyte repletion as needed  Monitor mental status  Crisis consult - pending, interested in inpatient rehab  Psych consult - pending, concern for underlying depression, no suicidal ideation    Acute kidney injury superimposed on CKD   Assessment & Plan  Lab Results   Component Value Date    EGFR 45 2024    EGFR 90 2023    EGFR 64 2023    CREATININE 1.60 (H) 2024    CREATININE 0.91 2023    CREATININE 1.20 2023     Cr on admission 1.60. Baseline creatinine around 1 to 1.3. Prerenal secondary to dehydration and alcohol abuse. Patient has had previous admissions with [facility] secondary to alcohol abuse.     Plan  Close  input and output monitoring  Daily weights  Holding Lisinopril for now  Avoid nephrotoxic medications  IV fluid therapy with D5NS  Monitor creatinine    GERD (gastroesophageal reflux disease)  Assessment & Plan  Chronic, no complaints on admission  Continue home protonix         Hyponatremia  Assessment & Plan  POA Na 135. Pt has chronic hyponatremia in setting of chronic alcohol use, poor oral intake and possible SIADH. He is on salt tablets 2 g three times a day. Pt has not taken his medications since a week and has not eaten since last 4 days.     Plan.  Continue with salt tablets 2 g TID  Pt on D5 NS  Trend BMP    Thrombocytopenia (HCC)  Assessment & Plan  Chronic. Plts on admission 50. Baseline in low 100s. Secondary to alcohol abuse  Hemoglobin stable with no bleeding or melena.     Plan  Trend CBC daily; continue to monitor closely  Will hold anticoagulation in platelet count drops further  Consider transfusion if less than 20 or suspected bleed     Nicotine abuse  Assessment & Plan  Chronic, continues to smoke a pack daily    Plan  Continue Nicotine patch  Continue home chantix  smoking cessation education    CAD (coronary artery disease)  Assessment & Plan  Patient with hx of CAD s/p CABG on Eliquis 5 mg BID at home. He has not taken his medications since one week.   Home meds: Aspirin 81 mg, Eliquis 5 mg BID, Pravastatin 40 mg     Plan  Continue with Eliquis  Continue with aspirin and Ranolazine  Holding Pravastatin for now due to elevated AST and ALT    Hypomagnesemia  Assessment & Plan  Mg 1.4 on admission. Pt has chronic hypomagnesemia secondary to poor intake and chronic alcohol use and is on Mag-ox 400 mg BID. He has token his home meds since one week. Received 2g IV Mg sulfate in the ED.     Plan  Continue with Mag-ox 2 times a day  Replete as needed  Trend daily    Essential hypertension  Assessment & Plan  Chronic, stable. BP on admission 134/76 mm Hg. Home meds: Lisinopril 10 mg QD & Lopressor 25  mg BID. Pt has not taken his meds since one week.     Plan  Holding Lisinopril for now due to [facility]  Continue with Lopressor 25 mg BID  Monitor BP and V/S    Type 2 diabetes mellitus (HCC)  Assessment & Plan  Lab Results   Component Value Date    HGBA1C 6.1 (H) 09/20/2023     Recent Labs     01/22/24  1641   POCGLU 74     Chronic, controlled. Home med includes Metformin 500 mg daily    Plan  Holding home metformin  ISS and Accu-checks ACHS  Carb controlled diet  Continue with gabapentin 300 mg TID daily for diabetic neuropathy       COPD (chronic obstructive pulmonary disease) ([facility])  Assessment & Plan  Patient has some shortness of breath and cough at baseline. He is a chronic smoker; smokes one pack daily.   No wheezing or respiratory distress; pt not in exacerbation    Plan  Continue home inhalers Breo Ellipta  Monitor oxygen saturation  Mucinex for cough    * Atrial fibrillation with rapid ventricular response (HCC)  Assessment & Plan  Pt presented to ER with weakness and palpitations secondary to alcohol abuse. Pt has not taken home Lopressor and Eliquis since one week  POA pt had tachycardia  EKG on admission showed Afib with RVR; likely due to medication on compliance  9/22/23 TTE: LVEF 62%. Diastolic function is moderately abnormal, consistent with grade 2 (abnormal) relaxation. No significant valvular abnormality. Nuclear stress test a year ago was negative   Troponin 137  In the ER, he was given Cardizem and is currently on Cardizem drip      Plan  Cardiology consulted  [id number] stroke risk score: 3 points, moderate-high risk.   Pt NPO for possible JENNY and cardioversion tomorrow  Chest X-ray - cardiomegaly present  Continue with Cardizem drip currently titrated to 5 mg/hr  Continue with Lopressor 25 mg BID  Continue with Eliquis 5 mg BID  Monitor telemetry  Trend troponin           Subjective:   Patient sitting up upon exam this morning.  He reports a continued headache.  He denies nausea or belly pain,  "although he has not had anything to eat.  Patient was trying to begin eating breakfast upon exam, but struggled to do so due to significant tremors. He is interested in inpatient rehab.  He reports brief psych treatment years ago which he didn't find it helpful, but is open to trying it again.  He denies any suicidal intention or ideation.     Objective:     Vitals: Blood pressure 113/64, pulse 75, temperature 98 °F (36.7 °C), temperature source Oral, resp. rate 18, height 6' 2\" (1.88 m), weight 96.2 kg (212 lb), SpO2 93%.,Body mass index is 27.22 kg/m².  Wt Readings from Last 3 Encounters:   01/23/24 96.2 kg (212 lb)   12/14/23 96.2 kg (212 lb)   12/14/23 96.6 kg (213 lb)       Intake/Output Summary (Last 24 hours) at 1/23/2024 1651  Last data filed at 1/23/2024 0630  Gross per 24 hour   Intake 2215 ml   Output 300 ml   Net 1915 ml       Physical Exam:   Physical Exam  HENT:      Head: Normocephalic.      Mouth/Throat:      Mouth: Mucous membranes are moist.   Cardiovascular:      Rate and Rhythm: Normal rate. Rhythm irregular.      Heart sounds: Normal heart sounds. No murmur heard.  Pulmonary:      Effort: Pulmonary effort is normal.      Breath sounds: Wheezing (faint, intermittent) present.   Abdominal:      General: Bowel sounds are normal.      Tenderness: There is no abdominal tenderness.   Musculoskeletal:      Right lower leg: No edema.      Left lower leg: No edema.   Skin:     Capillary Refill: Capillary refill takes less than 2 seconds.   Neurological:      Mental Status: He is alert and oriented to person, place, and time.      Comments: Significant hand tremors, unable to feed himself           Recent Results (from the past 24 hour(s))   CBC and differential    Collection Time: 01/22/24  4:53 PM   Result Value Ref Range    WBC 4.97 4.31 - 10.16 Thousand/uL    RBC 4.62 3.88 - 5.62 Million/uL    Hemoglobin 15.6 12.0 - 17.0 g/dL    Hematocrit 43.7 36.5 - 49.3 %    MCV 95 82 - 98 fL    MCH 33.8 26.8 - " "34.3 pg    MCHC 35.7 31.4 - 37.4 g/dL    RDW 14.3 11.6 - 15.1 %    MPV 11.5 8.9 - 12.7 fL    Platelets 50 (L) 149 - 390 Thousands/uL    nRBC 0 /100 WBCs    Neutrophils Relative 67 43 - 75 %    Immat GRANS % 0 0 - 2 %    Lymphocytes Relative 26 14 - 44 %    Monocytes Relative 6 4 - 12 %    Eosinophils Relative 0 0 - 6 %    Basophils Relative 1 0 - 1 %    Neutrophils Absolute 3.27 1.85 - 7.62 Thousands/µL    Immature Grans Absolute 0.02 0.00 - 0.20 Thousand/uL    Lymphocytes Absolute 1.27 0.60 - 4.47 Thousands/µL    Monocytes Absolute 0.32 0.17 - 1.22 Thousand/µL    Eosinophils Absolute 0.02 0.00 - 0.61 Thousand/µL    Basophils Absolute 0.07 0.00 - 0.10 Thousands/µL   Comprehensive metabolic panel    Collection Time: 01/22/24  4:53 PM   Result Value Ref Range    Sodium 135 135 - 147 mmol/L    Potassium 4.2 3.5 - 5.3 mmol/L    Chloride 93 (L) 96 - 108 mmol/L    CO2 21 21 - 32 mmol/L    ANION GAP 21 mmol/L    BUN 47 (H) 5 - 25 mg/dL    Creatinine 1.60 (H) 0.60 - 1.30 mg/dL    Glucose 76 65 - 140 mg/dL    Calcium 8.6 8.4 - 10.2 mg/dL     (H) 13 - 39 U/L     (H) 7 - 52 U/L    Alkaline Phosphatase 90 34 - 104 U/L    Total Protein 7.8 6.4 - 8.4 g/dL    Albumin 4.4 3.5 - 5.0 g/dL    Total Bilirubin 1.04 (H) 0.20 - 1.00 mg/dL    eGFR 45 ml/min/1.73sq m   Ethanol    Collection Time: 01/22/24  4:53 PM   Result Value Ref Range    Ethanol Lvl 373 (H) <10 mg/dL   Magnesium    Collection Time: 01/22/24  4:53 PM   Result Value Ref Range    Magnesium 1.4 (L) 1.9 - 2.7 mg/dL   HS Troponin 0hr (reflex protocol)    Collection Time: 01/22/24  4:53 PM   Result Value Ref Range    hs TnI 0hr 137 (H) \"Refer to ACS Flowchart\"- see link ng/L   Blood gas, venous    Collection Time: 01/22/24  4:53 PM   Result Value Ref Range    pH, Parrish 7.362 7.300 - 7.400    pCO2, Parrish 42.7 42.0 - 50.0 mm Hg    pO2, Parrish 59.4 (H) 35.0 - 45.0 mm Hg    HCO3, Parrish 23.7 (L) 24 - 30 mmol/L    Base Excess, Parrish -1.8 mmol/L    O2 Content, Parrish 19.1 ml/dL    O2 " HGB, VENOUS 84.2 (H) 60.0 - 80.0 %   Beta Hydroxybutyrate    Collection Time: 01/22/24  4:53 PM   Result Value Ref Range    BETA-HYDROXYBUTYRATE 3.2 (H) <0.6 mmol/L   Lipase    Collection Time: 01/22/24  4:53 PM   Result Value Ref Range    Lipase 122 (H) 11 - 82 u/L   Smear Review(Phlebs Do Not Order)    Collection Time: 01/22/24  4:53 PM   Result Value Ref Range    RBC Morphology Present     Platelet Estimate Decreased (A) Adequate    Large Platelet Present     Anisocytosis Present    Rapid drug screen, urine    Collection Time: 01/22/24  6:08 PM   Result Value Ref Range    Amph/Meth UR Negative Negative    Barbiturate Ur Negative Negative    Benzodiazepine Urine Negative Negative    Cocaine Urine Negative Negative    Methadone Urine Negative Negative    Opiate Urine Negative Negative    PCP Ur Negative Negative    THC Urine Negative Negative    Oxycodone Urine Negative Negative   UA (URINE) with reflex to Scope    Collection Time: 01/22/24  6:08 PM   Result Value Ref Range    Color, UA Yellow     Clarity, UA Slightly Cloudy     Specific Gravity, UA 1.025 1.000 - 1.030    pH, UA 6.0 5.0, 5.5, 6.0, 6.5, 7.0, 7.5, 8.0, 8.5, 9.0    Leukocytes, UA Negative Negative    Nitrite, UA Negative Negative    Protein,  (2+) (A) Negative mg/dl    Glucose, UA Negative Negative mg/dl    Ketones, UA 15 (1+) (A) Negative mg/dl    Urobilinogen, UA 1.0 0.2, 1.0 E.U./dl E.U./dl    Bilirubin, UA Small (A) Negative    Occult Blood, UA Small (A) Negative   Urine Microscopic    Collection Time: 01/22/24  6:08 PM   Result Value Ref Range    RBC, UA 0-1 None Seen, 0-1, 1-2, 2-4, 0-5 /hpf    WBC, UA 0-1 None Seen, 0-1, 1-2, 0-5, 2-4 /hpf    Epithelial Cells Occasional None Seen, Occasional /hpf    Bacteria, UA Moderate (A) None Seen, Occasional /hpf    Hyaline Casts, UA 1-2 (A) (none) /lpf    AMORPH URATES Occasional /hpf   Fingerstick Glucose (POCT)    Collection Time: 01/22/24  8:49 PM   Result Value Ref Range    POC Glucose 184 (H)  "65 - 140 mg/dl   HS Troponin I 2hr    Collection Time: 01/22/24  9:22 PM   Result Value Ref Range    hs TnI 2hr 110 (H) \"Refer to ACS Flowchart\"- see link ng/L    Delta 2hr hsTnI -27 <20 ng/L   Lactic acid, plasma (w/reflex if result > 2.0)    Collection Time: 01/22/24  9:22 PM   Result Value Ref Range    LACTIC ACID 1.2 0.5 - 2.0 mmol/L   HS Troponin I 4hr    Collection Time: 01/22/24 11:18 PM   Result Value Ref Range    hs TnI 4hr 105 (H) \"Refer to ACS Flowchart\"- see link ng/L    Delta 4hr hsTnI -32 <20 ng/L   Comprehensive metabolic panel    Collection Time: 01/23/24  4:59 AM   Result Value Ref Range    Sodium 135 135 - 147 mmol/L    Potassium 4.2 3.5 - 5.3 mmol/L    Chloride 100 96 - 108 mmol/L    CO2 23 21 - 32 mmol/L    ANION GAP 12 mmol/L    BUN 48 (H) 5 - 25 mg/dL    Creatinine 1.47 (H) 0.60 - 1.30 mg/dL    Glucose 111 65 - 140 mg/dL    Calcium 8.2 (L) 8.4 - 10.2 mg/dL     (H) 13 - 39 U/L    ALT 99 (H) 7 - 52 U/L    Alkaline Phosphatase 69 34 - 104 U/L    Total Protein 6.3 (L) 6.4 - 8.4 g/dL    Albumin 3.5 3.5 - 5.0 g/dL    Total Bilirubin 0.94 0.20 - 1.00 mg/dL    eGFR 50 ml/min/1.73sq m   Magnesium    Collection Time: 01/23/24  4:59 AM   Result Value Ref Range    Magnesium 1.6 (L) 1.9 - 2.7 mg/dL   TSH, 3rd generation with Free T4 reflex    Collection Time: 01/23/24  4:59 AM   Result Value Ref Range    TSH 3RD GENERATON 0.424 (L) 0.450 - 4.500 uIU/mL   T4, free    Collection Time: 01/23/24  4:59 AM   Result Value Ref Range    Free T4 0.84 0.61 - 1.12 ng/dL   Phosphorus    Collection Time: 01/23/24  4:59 AM   Result Value Ref Range    Phosphorus 3.8 2.3 - 4.1 mg/dL   Fingerstick Glucose (POCT)    Collection Time: 01/23/24  7:45 AM   Result Value Ref Range    POC Glucose 117 65 - 140 mg/dl   Protime-INR    Collection Time: 01/23/24  9:36 AM   Result Value Ref Range    Protime 14.2 11.6 - 14.5 seconds    INR 1.08 0.84 - 1.19   COVID/FLU/RSV    Collection Time: 01/23/24  9:36 AM    Specimen: Nose; " Nares   Result Value Ref Range    SARS-CoV-2 Negative Negative    INFLUENZA A PCR Negative Negative    INFLUENZA B PCR Negative Negative    RSV PCR Negative Negative   Fingerstick Glucose (POCT)    Collection Time: 01/23/24 11:32 AM   Result Value Ref Range    POC Glucose 174 (H) 65 - 140 mg/dl       Current Facility-Administered Medications   Medication Dose Route Frequency Provider Last Rate Last Admin    albuterol (PROVENTIL HFA,VENTOLIN HFA) inhaler 2 puff  2 puff Inhalation Q4H PRN Bipin Freed MD        apixaban (ELIQUIS) tablet 5 mg  5 mg Oral BID Bipin Freed MD   5 mg at 01/23/24 0927    aspirin chewable tablet 81 mg  81 mg Oral Daily Bipin Freed MD   81 mg at 01/23/24 0926    calcium carbonate (TUMS) chewable tablet 1,000 mg  1,000 mg Oral Daily PRN Bipin Freed MD        diltiazem (CARDIZEM) 125 mg in sodium chloride 0.9 % 125 mL infusion  1-15 mg/hr Intravenous Titrated Bipin Freed MD 5 mL/hr at 01/23/24 0900 5 mg/hr at 01/23/24 0900    ferrous sulfate tablet 325 mg  325 mg Oral Daily With Breakfast Bipin Freed MD   325 mg at 01/23/24 0927    fluticasone-vilanterol 200-25 mcg/actuation 1 puff  1 puff Inhalation Daily Bipin Freed MD   1 puff at 01/23/24 0928    folic acid (FOLVITE) tablet 1 mg  1 mg Oral Daily Bipin Freed MD   1 mg at 01/23/24 0927    gabapentin (NEURONTIN) capsule 300 mg  300 mg Oral TID Bipin Freed MD   300 mg at 01/23/24 0927    guaiFENesin (MUCINEX) 12 hr tablet 600 mg  600 mg Oral Q12H SEDA Bipin Freed MD   600 mg at 01/23/24 0927    guaiFENesin (ROBITUSSIN) oral liquid 200 mg  200 mg Oral Q4H PRN Elliott Zimmerman MD        ipratropium-albuterol (DUO-NEB) 0.5-2.5 mg/3 mL inhalation solution 3 mL  3 mL Nebulization Q6H PRN Elliott Zimmerman MD   3 mL at 01/23/24 1203    magnesium Oxide (MAG-OX) tablet 400 mg  400 mg Oral BID Bipin Freed MD   400 mg at 01/23/24 0925    metoprolol  tartrate (LOPRESSOR) tablet 25 mg  25 mg Oral Q12H SEDA Bipin Freed MD   25 mg at 01/23/24 0926    multivitamin-minerals (CENTRUM) tablet 1 tablet  1 tablet Oral Daily Bipin Freed MD   1 tablet at 01/23/24 0926    naltrexone (REVIA) tablet 50 mg  50 mg Oral Daily Bipin Freed MD   50 mg at 01/23/24 0928    nicotine (NICODERM CQ) 14 mg/24hr TD 24 hr patch 1 patch  1 patch Transdermal Daily Bipin Freed MD   1 patch at 01/23/24 0926    ondansetron (ZOFRAN-ODT) dispersible tablet 4 mg  4 mg Oral Q6H PRN Elliott Pablo MD   4 mg at 01/23/24 1404    pantoprazole (PROTONIX) EC tablet 40 mg  40 mg Oral BID Bipin Freed MD   40 mg at 01/23/24 0926    ranolazine (RANEXA) 12 hr tablet 500 mg  500 mg Oral Q12H Bipin Freed MD   500 mg at 01/23/24 0927    sodium chloride tablet 2 g  2 g Oral TID Bipin Freed MD   2 g at 01/23/24 0926    tamsulosin (FLOMAX) capsule 0.4 mg  0.4 mg Oral Daily Bipin Freed MD   0.4 mg at 01/23/24 0927    thiamine tablet 100 mg  100 mg Oral Daily Bipin Freed MD   100 mg at 01/23/24 0927    varenicline (CHANTIX) tablet 1 mg  1 mg Oral BID Bipin Freed MD           Invasive Devices       Peripheral Intravenous Line  Duration             Peripheral IV 01/22/24 Left Forearm 1 day                    Lab, Imaging and other studies: I have personally reviewed pertinent reports.    VTE Pharmacologic Prophylaxis: eliquis  VTE Mechanical Prophylaxis: sequential compression device    Elliott Pablo MD

## 2024-01-23 NOTE — ASSESSMENT & PLAN NOTE
Patient has some shortness of breath and cough at baseline. He is a chronic smoker, 1.5 PPD. There are no PFTs or pulm notes on file. Does not appear to be in acute exacerbation.    Plan  Continue home inhalers Sonya bartlett

## 2024-01-23 NOTE — ASSESSMENT & PLAN NOTE
>>ASSESSMENT AND PLAN FOR ESSENTIAL HYPERTENSION WRITTEN ON 1/27/2024  3:49 PM BY DIONISIO BOWIE MD    Chronic, stable. BP on admission 134/76 mm Hg. Home meds: Lisinopril 10 mg QD & Lopressor 25 mg BID.    Plan  Continue home meds

## 2024-01-23 NOTE — PLAN OF CARE
Problem: PAIN - ADULT  Goal: Verbalizes/displays adequate comfort level or baseline comfort level  Description: Interventions:  - Encourage patient to monitor pain and request assistance  - Assess pain using appropriate pain scale  - Administer analgesics based on type and severity of pain and evaluate response  - Implement non-pharmacological measures as appropriate and evaluate response  - Consider cultural and social influences on pain and pain management  - Notify physician/advanced practitioner if interventions unsuccessful or patient reports new pain  Outcome: Progressing     Problem: INFECTION - ADULT  Goal: Absence or prevention of progression during hospitalization  Description: INTERVENTIONS:  - Assess and monitor for signs and symptoms of infection  - Monitor lab/diagnostic results  - Monitor all insertion sites, i.e. indwelling lines, tubes, and drains  - [place] appropriate cooling/warming therapies per order  - Administer medications as ordered  - Instruct and encourage patient and family to use good hand hygiene technique  - Identify and instruct in appropriate isolation precautions for identified infection/condition  Outcome: Progressing     Problem: SAFETY ADULT  Goal: Patient will remain free of falls  Description: INTERVENTIONS:  - Educate patient/family on patient safety including physical limitations  - Instruct patient to call for assistance with activity   - Consult OT/PT to assist with strengthening/mobility   - Keep Call bell within reach  - Keep bed low and locked with side rails adjusted as appropriate  - Keep care items and personal belongings within reach  - Initiate and maintain comfort rounds  - Make Fall Risk Sign visible to staff  - Initiate/Maintain bed alarm  - Obtain necessary fall risk management equipment: n/a  - Apply yellow socks and bracelet for high fall risk patients  - Consider moving patient to room near nurses station  Outcome: Progressing  Goal: Maintain or return  to baseline ADL function  Description: INTERVENTIONS:  -  Assess patient's ability to carry out ADLs; assess patient's baseline for ADL function and identify physical deficits which impact ability to perform ADLs (bathing, care of mouth/teeth, toileting, grooming, dressing, etc.)  - Assess/evaluate cause of self-care deficits   - Assess range of motion  - Assess patient's mobility; develop plan if impaired  - Assess patient's need for assistive devices and provide as appropriate  - Encourage maximum independence but intervene and supervise when necessary  - Involve family in performance of ADLs  - Assess for home care needs following discharge   - Consider OT consult to assist with ADL evaluation and planning for discharge  - Provide patient education as appropriate  Outcome: Progressing  Goal: Maintains/Returns to pre admission functional level  Description: INTERVENTIONS:  - Perform AM-PAC 6 Click Basic Mobility/ Daily Activity assessment daily.  - Set and communicate daily mobility goal to care team and patient/family/caregiver.   - Collaborate with rehabilitation services on mobility goals if consulted  - Out of bed for toileting  - Record patient progress and toleration of activity level   Outcome: Progressing     Problem: DISCHARGE PLANNING  Goal: Discharge to home or other facility with appropriate resources  Description: INTERVENTIONS:  - Identify barriers to discharge w/patient and caregiver  - Arrange for needed discharge resources and transportation as appropriate  - Identify discharge learning needs (meds, wound care, etc.)  - Arrange for interpretive services to assist at discharge as needed  - Refer to Case Management Department for coordinating discharge planning if the patient needs post-hospital services based on physician/advanced practitioner order or complex needs related to functional status, cognitive ability, or social support system  Outcome: Progressing     Problem: Knowledge Deficit  Goal:  Patient/family/caregiver demonstrates understanding of disease process, treatment plan, medications, and discharge instructions  Description: Complete learning assessment and assess knowledge base.  Interventions:  - Provide teaching at level of understanding  - Provide teaching via preferred learning methods  Outcome: Progressing     Problem: NEUROSENSORY - ADULT  Goal: Achieves maximal functionality and self care  Description: INTERVENTIONS  - Monitor swallowing and airway patency with patient fatigue and changes in neurological status  - Encourage and assist patient to increase activity and self care.   - Encourage visually impaired, hearing impaired and aphasic patients to use assistive/communication devices  Outcome: Progressing     Problem: CARDIOVASCULAR - ADULT  Goal: Maintains optimal cardiac output and hemodynamic stability  Description: INTERVENTIONS:  - Monitor I/O, vital signs and rhythm  - Monitor for S/S and trends of decreased cardiac output  - Administer and titrate ordered vasoactive medications to optimize hemodynamic stability  - Assess quality of pulses, skin color and temperature  - Assess for signs of decreased coronary artery perfusion  - Instruct patient to report change in severity of symptoms  Outcome: Progressing  Goal: Absence of cardiac dysrhythmias or at baseline rhythm  Description: INTERVENTIONS:  - Continuous cardiac monitoring, vital signs, obtain 12 lead EKG if ordered  - Administer antiarrhythmic and heart rate control medications as ordered  - Monitor electrolytes and administer replacement therapy as ordered  Outcome: Progressing     Problem: RESPIRATORY - ADULT  Goal: Achieves optimal ventilation and oxygenation  Description: INTERVENTIONS:  - Assess for changes in respiratory status  - Assess for changes in mentation and behavior  - Position to facilitate oxygenation and minimize respiratory effort  - Oxygen administered by appropriate delivery if ordered  - Initiate smoking  cessation education as indicated  - Encourage broncho-pulmonary hygiene including cough, deep breathe, Incentive Spirometry  - Assess the need for suctioning and aspirate as needed  - Assess and instruct to report SOB or any respiratory difficulty  - Respiratory Therapy support as indicated  Outcome: Progressing     Problem: GENITOURINARY - ADULT  Goal: Maintains or returns to baseline urinary function  Description: INTERVENTIONS:  - Assess urinary function  - Encourage oral fluids to ensure adequate hydration if ordered  - Administer IV fluids as ordered to ensure adequate hydration  - Administer ordered medications as needed  - Offer frequent toileting  - Follow urinary retention protocol if ordered  Outcome: Progressing     Problem: METABOLIC, FLUID AND ELECTROLYTES - ADULT  Goal: Electrolytes maintained within normal limits  Description: INTERVENTIONS:  - Monitor labs and assess patient for signs and symptoms of electrolyte imbalances  - Administer electrolyte replacement as ordered  - Monitor response to electrolyte replacements, including repeat lab results as appropriate  - Instruct patient on fluid and nutrition as appropriate  Outcome: Progressing  Goal: Fluid balance maintained  Description: INTERVENTIONS:  - Monitor labs   - Monitor I/O and WT  - Instruct patient on fluid and nutrition as appropriate  - Assess for signs & symptoms of volume excess or deficit  Outcome: Progressing  Goal: Glucose maintained within target range  Description: INTERVENTIONS:  - Monitor Blood Glucose as ordered  - Assess for signs and symptoms of hyperglycemia and hypoglycemia  - Administer ordered medications to maintain glucose within target range  - Assess nutritional intake and initiate nutrition service referral as needed  Outcome: Progressing     Problem: MUSCULOSKELETAL - ADULT  Goal: Maintain or return mobility to safest level of function  Description: INTERVENTIONS:  - Assess patient's ability to carry out ADLs; assess  patient's baseline for ADL function and identify physical deficits which impact ability to perform ADLs (bathing, care of mouth/teeth, toileting, grooming, dressing, etc.)  - Assess/evaluate cause of self-care deficits   - Assess range of motion  - Assess patient's mobility  - Assess patient's need for assistive devices and provide as appropriate  - Encourage maximum independence but intervene and supervise when necessary  - Involve family in performance of ADLs  - Assess for home care needs following discharge   - Consider OT consult to assist with ADL evaluation and planning for discharge  - Provide patient education as appropriate  Outcome: Progressing

## 2024-01-23 NOTE — ASSESSMENT & PLAN NOTE
Chronic, stable. BP on admission 134/76 mm Hg. Home meds: Lisinopril 10 mg QD & Lopressor 25 mg BID.    Plan  Continue home meds

## 2024-01-23 NOTE — ASSESSMENT & PLAN NOTE
Chronic, continues to smoke a pack daily    Plan  Continue Nicotine patch  Continue home chantix  smoking cessation education

## 2024-01-23 NOTE — ASSESSMENT & PLAN NOTE
Patient presented to ER with generalized weakness secondary to alcohol use. He has been drinking a quart of vodka every day for the past 2 weeks, most recent drink was day prior to admission. Quit drinking for approximately 1 month but relapsed again around [place] time when he became very depressed about his son's death which occurred in 2005.  POA with CIWA of 21, EtOH level 313 had tremors, palpitations and sweating; no signs of AMS  Admitted to the floor and started on CIWA. Over the following days patient received ativan per protocol, but despite treatment he became diffusely tremulous, tachycardic, agitated, hallucinating, and disoriented with a CIWA of 37. He was then transferred to the ICU  In ICU he received phenobarbital and precedex with gradual improvement of his symptoms. He then was downgraded to the medical floor.    Plan  Monitored on CIWA protocol  Seizure precautions, aspiration precautions  Thiamine, folate and multivitamins   Electrolyte repletion as needed  Neuro checks q4h  Crisis and psych consult - patient left AMA

## 2024-01-24 ENCOUNTER — TELEPHONE (OUTPATIENT)
Dept: PSYCHIATRY | Facility: CLINIC | Age: 64
End: 2024-01-24

## 2024-01-24 PROBLEM — Z71.6 ENCOUNTER FOR SMOKING CESSATION COUNSELING: Status: RESOLVED | Noted: 2020-09-14 | Resolved: 2024-01-24

## 2024-01-24 PROBLEM — H10.9 BACTERIAL CONJUNCTIVITIS OF BOTH EYES: Status: ACTIVE | Noted: 2024-01-24

## 2024-01-24 PROBLEM — E03.9 HYPOTHYROIDISM: Chronic | Status: ACTIVE | Noted: 2024-01-24

## 2024-01-24 PROBLEM — S22.41XA CLOSED FRACTURE OF MULTIPLE RIBS OF RIGHT SIDE: Status: RESOLVED | Noted: 2019-11-23 | Resolved: 2024-01-24

## 2024-01-24 PROBLEM — E87.1 HYPONATREMIA: Status: RESOLVED | Noted: 2019-10-17 | Resolved: 2024-01-24

## 2024-01-24 PROBLEM — R79.89 TROPONIN LEVEL ELEVATED: Status: ACTIVE | Noted: 2024-01-24

## 2024-01-24 PROBLEM — K92.1 MELENA: Status: RESOLVED | Noted: 2023-10-29 | Resolved: 2024-01-24

## 2024-01-24 PROBLEM — Z78.9 POOR HISTORIAN: Status: RESOLVED | Noted: 2021-10-24 | Resolved: 2024-01-24

## 2024-01-24 PROBLEM — R65.10 SIRS (SYSTEMIC INFLAMMATORY RESPONSE SYNDROME) (HCC): Status: RESOLVED | Noted: 2019-11-23 | Resolved: 2024-01-24

## 2024-01-24 PROBLEM — B96.89 BACTERIAL CONJUNCTIVITIS OF BOTH EYES: Status: ACTIVE | Noted: 2024-01-24

## 2024-01-24 PROBLEM — F10.939 ALCOHOL WITHDRAWAL (HCC): Status: ACTIVE | Noted: 2019-11-23

## 2024-01-24 PROBLEM — F10.131 ALCOHOL ABUSE WITH WITHDRAWAL DELIRIUM (HCC): Status: ACTIVE | Noted: 2019-11-23

## 2024-01-24 LAB
ABO GROUP BLD: NORMAL
ALBUMIN SERPL BCP-MCNC: 3.5 G/DL (ref 3.5–5)
ALP SERPL-CCNC: 73 U/L (ref 34–104)
ALT SERPL W P-5'-P-CCNC: 68 U/L (ref 7–52)
ANION GAP SERPL CALCULATED.3IONS-SCNC: 7 MMOL/L
AST SERPL W P-5'-P-CCNC: 92 U/L (ref 13–39)
BASOPHILS # BLD AUTO: 0.03 THOUSANDS/ÂΜL (ref 0–0.1)
BASOPHILS NFR BLD AUTO: 1 % (ref 0–1)
BILIRUB SERPL-MCNC: 0.95 MG/DL (ref 0.2–1)
BLD GP AB SCN SERPL QL: NEGATIVE
BNP SERPL-MCNC: 549 PG/ML (ref 0–100)
BUN SERPL-MCNC: 30 MG/DL (ref 5–25)
CALCIUM SERPL-MCNC: 9 MG/DL (ref 8.4–10.2)
CHLORIDE SERPL-SCNC: 99 MMOL/L (ref 96–108)
CO2 SERPL-SCNC: 29 MMOL/L (ref 21–32)
CREAT SERPL-MCNC: 1.04 MG/DL (ref 0.6–1.3)
EOSINOPHIL # BLD AUTO: 0.1 THOUSAND/ÂΜL (ref 0–0.61)
EOSINOPHIL NFR BLD AUTO: 2 % (ref 0–6)
ERYTHROCYTE [DISTWIDTH] IN BLOOD BY AUTOMATED COUNT: 14.1 % (ref 11.6–15.1)
EST. AVERAGE GLUCOSE BLD GHB EST-MCNC: 108 MG/DL
GFR SERPL CREATININE-BSD FRML MDRD: 77 ML/MIN/1.73SQ M
GLUCOSE SERPL-MCNC: 108 MG/DL (ref 65–140)
GLUCOSE SERPL-MCNC: 108 MG/DL (ref 65–140)
GLUCOSE SERPL-MCNC: 114 MG/DL (ref 65–140)
GLUCOSE SERPL-MCNC: 124 MG/DL (ref 65–140)
GLUCOSE SERPL-MCNC: 80 MG/DL (ref 65–140)
HBA1C MFR BLD: 5.4 %
HCT VFR BLD AUTO: 35.5 % (ref 36.5–49.3)
HGB BLD-MCNC: 12.3 G/DL (ref 12–17)
IMM GRANULOCYTES # BLD AUTO: 0.02 THOUSAND/UL (ref 0–0.2)
IMM GRANULOCYTES NFR BLD AUTO: 0 % (ref 0–2)
LIPASE SERPL-CCNC: 74 U/L (ref 11–82)
LYMPHOCYTES # BLD AUTO: 0.78 THOUSANDS/ÂΜL (ref 0.6–4.47)
LYMPHOCYTES NFR BLD AUTO: 17 % (ref 14–44)
MAGNESIUM SERPL-MCNC: 1.2 MG/DL (ref 1.9–2.7)
MCH RBC QN AUTO: 32.8 PG (ref 26.8–34.3)
MCHC RBC AUTO-ENTMCNC: 33.2 G/DL (ref 31.4–37.4)
MCV RBC AUTO: 99 FL (ref 82–98)
MONOCYTES # BLD AUTO: 0.47 THOUSAND/ÂΜL (ref 0.17–1.22)
MONOCYTES NFR BLD AUTO: 10 % (ref 4–12)
NEUTROPHILS # BLD AUTO: 3.22 THOUSANDS/ÂΜL (ref 1.85–7.62)
NEUTS SEG NFR BLD AUTO: 70 % (ref 43–75)
NRBC BLD AUTO-RTO: 0 /100 WBCS
PHOSPHATE SERPL-MCNC: 1.7 MG/DL (ref 2.3–4.1)
PLATELET # BLD AUTO: 21 THOUSANDS/UL (ref 149–390)
PLATELET BLD QL SMEAR: ABNORMAL
PMV BLD AUTO: 11.8 FL (ref 8.9–12.7)
POTASSIUM SERPL-SCNC: 4.2 MMOL/L (ref 3.5–5.3)
PROT SERPL-MCNC: 6.5 G/DL (ref 6.4–8.4)
RBC # BLD AUTO: 3.6 MILLION/UL (ref 3.88–5.62)
RBC MORPH BLD: NORMAL
RH BLD: POSITIVE
SODIUM SERPL-SCNC: 135 MMOL/L (ref 135–147)
SPECIMEN EXPIRATION DATE: NORMAL
WBC # BLD AUTO: 4.62 THOUSAND/UL (ref 4.31–10.16)

## 2024-01-24 PROCEDURE — 99232 SBSQ HOSP IP/OBS MODERATE 35: CPT | Performed by: INTERNAL MEDICINE

## 2024-01-24 PROCEDURE — 83880 ASSAY OF NATRIURETIC PEPTIDE: CPT | Performed by: NURSE PRACTITIONER

## 2024-01-24 PROCEDURE — 83036 HEMOGLOBIN GLYCOSYLATED A1C: CPT | Performed by: NURSE PRACTITIONER

## 2024-01-24 PROCEDURE — 80053 COMPREHEN METABOLIC PANEL: CPT

## 2024-01-24 PROCEDURE — 99291 CRITICAL CARE FIRST HOUR: CPT | Performed by: NURSE PRACTITIONER

## 2024-01-24 PROCEDURE — 84100 ASSAY OF PHOSPHORUS: CPT

## 2024-01-24 PROCEDURE — 86900 BLOOD TYPING SEROLOGIC ABO: CPT | Performed by: NURSE PRACTITIONER

## 2024-01-24 PROCEDURE — 83735 ASSAY OF MAGNESIUM: CPT

## 2024-01-24 PROCEDURE — 86901 BLOOD TYPING SEROLOGIC RH(D): CPT | Performed by: NURSE PRACTITIONER

## 2024-01-24 PROCEDURE — 87081 CULTURE SCREEN ONLY: CPT | Performed by: ANESTHESIOLOGY

## 2024-01-24 PROCEDURE — 85025 COMPLETE CBC W/AUTO DIFF WBC: CPT

## 2024-01-24 PROCEDURE — 82948 REAGENT STRIP/BLOOD GLUCOSE: CPT

## 2024-01-24 PROCEDURE — 83690 ASSAY OF LIPASE: CPT | Performed by: NURSE PRACTITIONER

## 2024-01-24 PROCEDURE — 86850 RBC ANTIBODY SCREEN: CPT | Performed by: NURSE PRACTITIONER

## 2024-01-24 RX ORDER — LORAZEPAM 1 MG/1
2 TABLET ORAL ONCE
Status: COMPLETED | OUTPATIENT
Start: 2024-01-24 | End: 2024-01-24

## 2024-01-24 RX ORDER — PRAVASTATIN SODIUM 40 MG
40 TABLET ORAL
Status: DISCONTINUED | OUTPATIENT
Start: 2024-01-24 | End: 2024-01-27 | Stop reason: HOSPADM

## 2024-01-24 RX ORDER — LORAZEPAM 2 MG/ML
4 INJECTION INTRAMUSCULAR ONCE
Status: COMPLETED | OUTPATIENT
Start: 2024-01-24 | End: 2024-01-24

## 2024-01-24 RX ORDER — ONDANSETRON 2 MG/ML
4 INJECTION INTRAMUSCULAR; INTRAVENOUS EVERY 6 HOURS PRN
Status: DISCONTINUED | OUTPATIENT
Start: 2024-01-24 | End: 2024-01-27 | Stop reason: HOSPADM

## 2024-01-24 RX ORDER — MAGNESIUM SULFATE HEPTAHYDRATE 40 MG/ML
4 INJECTION, SOLUTION INTRAVENOUS ONCE
Status: COMPLETED | OUTPATIENT
Start: 2024-01-24 | End: 2024-01-24

## 2024-01-24 RX ORDER — AMOXICILLIN 250 MG
1 CAPSULE ORAL 2 TIMES DAILY
Status: DISCONTINUED | OUTPATIENT
Start: 2024-01-24 | End: 2024-01-27 | Stop reason: HOSPADM

## 2024-01-24 RX ORDER — INSULIN LISPRO 100 [IU]/ML
1-6 INJECTION, SOLUTION INTRAVENOUS; SUBCUTANEOUS
Status: DISCONTINUED | OUTPATIENT
Start: 2024-01-24 | End: 2024-01-27 | Stop reason: HOSPADM

## 2024-01-24 RX ADMIN — PRAVASTATIN SODIUM 40 MG: 40 TABLET ORAL at 18:16

## 2024-01-24 RX ADMIN — CIPROFLOXACIN HYDROCHLORIDE 1 INCH: 3 OINTMENT OPHTHALMIC at 21:23

## 2024-01-24 RX ADMIN — SODIUM CHLORIDE 2 G: 1 TABLET ORAL at 21:23

## 2024-01-24 RX ADMIN — SODIUM CHLORIDE 2 G: 1 TABLET ORAL at 18:16

## 2024-01-24 RX ADMIN — GABAPENTIN 300 MG: 300 CAPSULE ORAL at 21:24

## 2024-01-24 RX ADMIN — GABAPENTIN 300 MG: 300 CAPSULE ORAL at 18:16

## 2024-01-24 RX ADMIN — GUAIFENESIN 600 MG: 600 TABLET, EXTENDED RELEASE ORAL at 21:23

## 2024-01-24 RX ADMIN — LORAZEPAM 2 MG: 1 TABLET ORAL at 03:30

## 2024-01-24 RX ADMIN — DOCUSATE SODIUM AND SENNOSIDES 1 TABLET: 8.6; 5 TABLET, FILM COATED ORAL at 18:16

## 2024-01-24 RX ADMIN — MAGNESIUM SULFATE HEPTAHYDRATE 4 G: 40 INJECTION, SOLUTION INTRAVENOUS at 09:31

## 2024-01-24 RX ADMIN — PHENOBARBITAL SODIUM 320 MG: 65 INJECTION INTRAMUSCULAR; INTRAVENOUS at 09:16

## 2024-01-24 RX ADMIN — NICOTINE 1 PATCH: 14 PATCH, EXTENDED RELEASE TRANSDERMAL at 09:44

## 2024-01-24 RX ADMIN — METOPROLOL TARTRATE 25 MG: 25 TABLET, FILM COATED ORAL at 21:23

## 2024-01-24 RX ADMIN — PHENOBARBITAL SODIUM 320 MG: 65 INJECTION INTRAMUSCULAR; INTRAVENOUS at 16:08

## 2024-01-24 RX ADMIN — CIPROFLOXACIN HYDROCHLORIDE: 3 OINTMENT OPHTHALMIC at 18:16

## 2024-01-24 RX ADMIN — PHENOBARBITAL SODIUM 320 MG: 65 INJECTION INTRAMUSCULAR; INTRAVENOUS at 20:13

## 2024-01-24 RX ADMIN — LORAZEPAM 4 MG: 2 INJECTION INTRAMUSCULAR; INTRAVENOUS at 06:45

## 2024-01-24 RX ADMIN — DILTIAZEM HYDROCHLORIDE 10 MG/HR: 5 INJECTION INTRAVENOUS at 09:07

## 2024-01-24 RX ADMIN — POTASSIUM PHOSPHATE, MONOBASIC POTASSIUM PHOSPHATE, DIBASIC 21 MMOL: 224; 236 INJECTION, SOLUTION, CONCENTRATE INTRAVENOUS at 08:27

## 2024-01-24 RX ADMIN — CIPROFLOXACIN HYDROCHLORIDE: 3 OINTMENT OPHTHALMIC at 11:58

## 2024-01-24 RX ADMIN — LORAZEPAM 4 MG: 2 INJECTION INTRAMUSCULAR; INTRAVENOUS at 07:41

## 2024-01-24 RX ADMIN — Medication 400 MG: at 18:16

## 2024-01-24 RX ADMIN — SODIUM PHOSPHATE, MONOBASIC, MONOHYDRATE AND SODIUM PHOSPHATE, DIBASIC, ANHYDROUS 30 MMOL: 142; 276 INJECTION, SOLUTION INTRAVENOUS at 09:27

## 2024-01-24 RX ADMIN — PANTOPRAZOLE SODIUM 40 MG: 40 TABLET, DELAYED RELEASE ORAL at 21:23

## 2024-01-24 NOTE — ASSESSMENT & PLAN NOTE
Secondary to alcohol abuse  Continue scheduled mag 400mg BID with additional 4g IV Mg today  Trend Mg level daily

## 2024-01-24 NOTE — ASSESSMENT & PLAN NOTE
Hx of CABG x 3 at [facility]   Continue metoprolol, statin, and ranexa

## 2024-01-24 NOTE — ASSESSMENT & PLAN NOTE
>>ASSESSMENT AND PLAN FOR ESSENTIAL HYPERTENSION WRITTEN ON 1/24/2024  4:51 PM BY CHELSEA LIANG    Continue metoprolol, hold lisinopril secondary to resolving ENMA

## 2024-01-24 NOTE — ASSESSMENT & PLAN NOTE
Patient with yellow purulent drainage from B/L eyes with crusting   Start cipro ophthalmic drops TID x 7 days

## 2024-01-24 NOTE — ASSESSMENT & PLAN NOTE
Hx of Afib on Eliquis and Metoprolol outpatient, unclear compliance   Afib RVR on admission given cardizem bolus and started on gtt 10mg/ht  HR now well controlled after phenobarb, d/c Cardizem   Continue PO Metoprolol  Eliquis resumed upon admission, however now D/C with drop in platelets to 21 this morning  Optimize electrolytes  Monitor rhythm on tele   Control symptoms of alcohol withdrawal as above which is likely  of uncontrolled Afib

## 2024-01-24 NOTE — PLAN OF CARE
Problem: PAIN - ADULT  Goal: Verbalizes/displays adequate comfort level or baseline comfort level  Description: Interventions:  - Encourage patient to monitor pain and request assistance  - Assess pain using appropriate pain scale  - Administer analgesics based on type and severity of pain and evaluate response  - Implement non-pharmacological measures as appropriate and evaluate response  - Consider cultural and social influences on pain and pain management  - Notify physician/advanced practitioner if interventions unsuccessful or patient reports new pain  Outcome: Progressing     Problem: INFECTION - ADULT  Goal: Absence or prevention of progression during hospitalization  Description: INTERVENTIONS:  - Assess and monitor for signs and symptoms of infection  - Monitor lab/diagnostic results  - Monitor all insertion sites, i.e. indwelling lines, tubes, and drains  - [place] appropriate cooling/warming therapies per order  - Administer medications as ordered  - Instruct and encourage patient and family to use good hand hygiene technique  - Identify and instruct in appropriate isolation precautions for identified infection/condition  Outcome: Progressing     Problem: SAFETY ADULT  Goal: Patient will remain free of falls  Description: INTERVENTIONS:  - Educate patient/family on patient safety including physical limitations  - Instruct patient to call for assistance with activity   - Consult OT/PT to assist with strengthening/mobility   - Keep Call bell within reach  - Keep bed low and locked with side rails adjusted as appropriate  - Keep care items and personal belongings within reach  - Initiate and maintain comfort rounds  - Make Fall Risk Sign visible to staff  - Initiate/Maintain bed alarm  - Obtain necessary fall risk management equipment: n/a  - Apply yellow socks and bracelet for high fall risk patients  - Consider moving patient to room near nurses station  Outcome: Progressing     Problem: NEUROSENSORY -  ADULT  Goal: Achieves maximal functionality and self care  Description: INTERVENTIONS  - Monitor swallowing and airway patency with patient fatigue and changes in neurological status  - Encourage and assist patient to increase activity and self care.   - Encourage visually impaired, hearing impaired and aphasic patients to use assistive/communication devices  Outcome: Progressing     Problem: CARDIOVASCULAR - ADULT  Goal: Maintains optimal cardiac output and hemodynamic stability  Description: INTERVENTIONS:  - Monitor I/O, vital signs and rhythm  - Monitor for S/S and trends of decreased cardiac output  - Administer and titrate ordered vasoactive medications to optimize hemodynamic stability  - Assess quality of pulses, skin color and temperature  - Assess for signs of decreased coronary artery perfusion  - Instruct patient to report change in severity of symptoms  Outcome: Progressing     Problem: CARDIOVASCULAR - ADULT  Goal: Absence of cardiac dysrhythmias or at baseline rhythm  Description: INTERVENTIONS:  - Continuous cardiac monitoring, vital signs, obtain 12 lead EKG if ordered  - Administer antiarrhythmic and heart rate control medications as ordered  - Monitor electrolytes and administer replacement therapy as ordered  Outcome: Progressing     Problem: RESPIRATORY - ADULT  Goal: Achieves optimal ventilation and oxygenation  Description: INTERVENTIONS:  - Assess for changes in respiratory status  - Assess for changes in mentation and behavior  - Position to facilitate oxygenation and minimize respiratory effort  - Oxygen administered by appropriate delivery if ordered  - Initiate smoking cessation education as indicated  - Encourage broncho-pulmonary hygiene including cough, deep breathe, Incentive Spirometry  - Assess the need for suctioning and aspirate as needed  - Assess and instruct to report SOB or any respiratory difficulty  - Respiratory Therapy support as indicated  Outcome: Progressing      Problem: GENITOURINARY - ADULT  Goal: Maintains or returns to baseline urinary function  Description: INTERVENTIONS:  - Assess urinary function  - Encourage oral fluids to ensure adequate hydration if ordered  - Administer IV fluids as ordered to ensure adequate hydration  - Administer ordered medications as needed  - Offer frequent toileting  - Follow urinary retention protocol if ordered  Outcome: Progressing     Problem: METABOLIC, FLUID AND ELECTROLYTES - ADULT  Goal: Electrolytes maintained within normal limits  Description: INTERVENTIONS:  - Monitor labs and assess patient for signs and symptoms of electrolyte imbalances  - Administer electrolyte replacement as ordered  - Monitor response to electrolyte replacements, including repeat lab results as appropriate  - Instruct patient on fluid and nutrition as appropriate  Outcome: Progressing     Problem: MUSCULOSKELETAL - ADULT  Goal: Maintain or return mobility to safest level of function  Description: INTERVENTIONS:  - Assess patient's ability to carry out ADLs; assess patient's baseline for ADL function and identify physical deficits which impact ability to perform ADLs (bathing, care of mouth/teeth, toileting, grooming, dressing, etc.)  - Assess/evaluate cause of self-care deficits   - Assess range of motion  - Assess patient's mobility  - Assess patient's need for assistive devices and provide as appropriate  - Encourage maximum independence but intervene and supervise when necessary  - Involve family in performance of ADLs  - Assess for home care needs following discharge   - Consider OT consult to assist with ADL evaluation and planning for discharge  - Provide patient education as appropriate  Outcome: Progressing

## 2024-01-24 NOTE — ASSESSMENT & PLAN NOTE
Hepatic steatosis on CT A/P 11/30  Mild elevation in LFTs, continue to trend hepatic function panel  Encourage abstinence from alcohol

## 2024-01-24 NOTE — ASSESSMENT & PLAN NOTE
Trop 137-->110-->105  Non MI troponin elevation in setting of Afib RVR   Inferior ST depressions on presenting EKG

## 2024-01-24 NOTE — ASSESSMENT & PLAN NOTE
Wt Readings from Last 3 Encounters:   01/24/24 88.9 kg (195 lb 15.8 oz)   12/14/23 96.2 kg (212 lb)   12/14/23 96.6 kg (213 lb)     Echo 9/2023: EF 62%, G2DD   from prior 95  (9/2023)  Continue home metoprolol, home lisinopril in setting of resolving ENMA  Strict I&O   Daily weights

## 2024-01-24 NOTE — TELEPHONE ENCOUNTER
Pt not awake and alert enough for consult. Advised RN to reach out if anything changes.   
[facility] reached out to RN to facilitate psych consult setup.  
Patient

## 2024-01-24 NOTE — ASSESSMENT & PLAN NOTE
1.5 PPD x 40 years  Nicotine patch   Smoking cessation education prior to discharge   Continue home chantix

## 2024-01-24 NOTE — ASSESSMENT & PLAN NOTE
No PFTs or pulm notes on file   Not in exacerbation   Current smoker, 1.5 PPD x 40 years  Encourage cessation  Continue home Breo daily  PRN duo-nebs

## 2024-01-24 NOTE — ASSESSMENT & PLAN NOTE
Lab Results   Component Value Date    EGFR 77 01/24/2024    EGFR 50 01/23/2024    EGFR 45 01/22/2024    CREATININE 1.04 01/24/2024    CREATININE 1.47 (H) 01/23/2024    CREATININE 1.60 (H) 01/22/2024     Hx of CKD with baseline creat 1.1-1.3  Admission creat 1.60 and received IVF  Current creat 1.0  Strict I&O  Trend BUN/creat  Avoid nephrotoxic agents

## 2024-01-24 NOTE — CONSULTS
[facility]  Consult  Name: Gregg Partida 63 y.o. male I MRN: [id number]  Unit/Bed#: ICU 10 I Date of Admission: 1/22/2024   Date of Service: 1/24/2024 I Hospital Day: 2    Consults    Assessment/Plan   * Alcohol abuse with withdrawal delirium (HCC)  Assessment & Plan  Alcohol level 373 on presentation, however patient stated he had not had a drink since the day prior and had signs of alcohol withdrawal with CIWA of 21  quit drinking for approximately 1 month but relapsed again around [place] time when he became very depressed about his son's death which occurred in 2005   Admitted to the floor and started on CIWA protocol, he received in total 24mg of Ativan per protocol since last evening and CIWA score was at 37  Patient diffusely tremulous, tachycardic, agitated, hallucinating, and disoriented. He also endorsed headache and nausea  Given 320mg IV phenobarbitol now, which is 1/3 of patient's 10mg/kg dose  Re-dose as clinically indicated   Trend CIWA score, but d/c CIWA protocol with the usage of phenobarbitol  Aspiration and seizure precautions  Neuro checks q4h  Continue thiamine and folic acid supplementation   Psych and crisis evaluation once medically stabilized       Atrial fibrillation with rapid ventricular response (HCC)  Assessment & Plan  Hx of Afib on Eliquis and Metoprolol outpatient, unclear compliance   Afib RVR on admission given cardizem bolus and started on gtt 10mg/ht  HR now well controlled after phenobarb, d/c Cardizem   Continue PO Metoprolol  Eliquis resumed upon admission, however now D/C with drop in platelets to 21 this morning  Optimize electrolytes  Monitor rhythm on tele   Control symptoms of alcohol withdrawal as above which is likely  of uncontrolled Afib    Acute kidney injury superimposed on CKD   Assessment & Plan  Lab Results   Component Value Date    EGFR 77 01/24/2024    EGFR 50 01/23/2024    EGFR 45 01/22/2024    CREATININE 1.04 01/24/2024     CREATININE 1.47 (H) 01/23/2024    CREATININE 1.60 (H) 01/22/2024     Hx of CKD with baseline creat 1.1-1.3  Admission creat 1.60 and received IVF  Current creat 1.0  Strict I&O  Trend BUN/creat  Avoid nephrotoxic agents     Bacterial conjunctivitis of both eyes  Assessment & Plan  Patient with yellow purulent drainage from B/L eyes with crusting   Start cipro ophthalmic drops TID x 7 days    Thrombocytopenia (HCC)  Assessment & Plan  Acute on chronic thrombocytopenia   Platelet count 50 on admission, decreased to 21 today  D/C Eliquis   Likely related to alcoholism and alcoholic fatty liver disease with decreased platelet production   Trend CBC daily   Transfuse for platelet count less than 20    Hyponatremia  Assessment & Plan  Baseline Na high 120s to low 130s since April 2021  Follows with Dr. Dugan, nephro outpatient  Etiology likely alcohol abuse/low solute intake  Continue outpatient regimen of salt tablets 2g TID  Current Na stable at 135    Hypomagnesemia  Assessment & Plan  Secondary to alcohol abuse  Continue scheduled mag 400mg BID with additional 4g IV Mg today  Trend Mg level daily     Fatty liver, alcoholic  Assessment & Plan  Hepatic steatosis on CT A/P 11/30  Mild elevation in LFTs, continue to trend hepatic function panel  Encourage abstinence from alcohol     GERD (gastroesophageal reflux disease)  Assessment & Plan  Continue daily protonix and PRN tums    Dyslipidemia  Assessment & Plan  Continue statin     Nicotine abuse  Assessment & Plan  1.5 PPD x 40 years  Nicotine patch   Smoking cessation education prior to discharge   Continue home chantix     Depression, recurrent (HCC)  Assessment & Plan  No home medications, self medicates with alcohol  Psych consult     COPD (chronic obstructive pulmonary disease) (HCC)  Assessment & Plan  No PFTs or pulm notes on file   Not in exacerbation   Current smoker, 1.5 PPD x 40 years  Encourage cessation  Continue home Breo daily  PRN  duo-nebs      Hypothyroidism  Assessment & Plan  Not on medication  TSH 0.42/T4 0.84    Chronic heart failure with preserved ejection fraction (HCC)  Assessment & Plan  Wt Readings from Last 3 Encounters:   01/24/24 88.9 kg (195 lb 15.8 oz)   12/14/23 96.2 kg (212 lb)   12/14/23 96.6 kg (213 lb)     Echo 9/2023: EF 62%, G2DD   from prior 95  (9/2023)  Continue home metoprolol, home lisinopril in setting of resolving ENMA  Strict I&O   Daily weights           Troponin level elevated  Assessment & Plan  Trop 137-->110-->105  Non MI troponin elevation in setting of Afib RVR   Inferior ST depressions on presenting EKG     CAD (coronary artery disease)  Assessment & Plan  Hx of CABG x 3 at [facility]   Continue metoprolol, statin, and ranexa    Essential hypertension  Assessment & Plan  Continue metoprolol, hold lisinopril secondary to resolving ENMA     Type 2 diabetes mellitus (HCC)  Assessment & Plan  Lab Results   Component Value Date    HGBA1C 5.4 01/24/2024       Recent Labs     01/23/24  1132 01/24/24  0848 01/24/24  1100 01/24/24  1529   POCGLU 174* 124 108 108       Blood Sugar Average: Last 72 hrs:  (P) 127    Hold home metformin  Start SSI coverage ACHS  Diabetic diet              History of Present Illness     HPI: Gregg Partida is a 63 year old male with PMH of heavy alcohol abuse, CAD s/p CABG, Afib on Eliquis, DM2, HTN, COPDE, GERD, nicotine dependence, and hyponatremia who presented to the ED on 1/22 with alcohol intoxication and generalized weakness. Alcohol level 373 on presentation, however patient stated he had not had a drink since the day prior and had signs of alcohol withdrawal with CIWA of 21. He had a fall PTA and sustained bruising on right arm. He had quit drinking for approximately 1 month but relapsed again around [place] time when he became very depressed about his youngest sons death which occurred in 2005. On admission he reported drinking 1 quart of vodka a day. Patient was  also in Afib RVR on presentation to ED and given Cardizem bolus and started on gtt. He was admitted to the floor under [facility] service with cardiology consult and started on CIWA protocol. Critical care asked to evaluate patient today d/t uncontrolled alcohol withdrawal symptoms despite multiple doses of Ativan per CIWA protocol. CIWA score of 37. Upon my evaluation patient diffusely tremulous, tachycardic, agitated, hallucinating, and disoriented. He also endorses headache and nausea. Will transfer patient to [facility] to initiate phenobarb for alcohol withdrawal.     Son Gregg called and updated on hospital admission, transfer to critical care service, and treatment/plan of care for alcohol withdrawal. All questions have been answered.     History obtained from chart review, medical team, son.     Review of Systems   Gastrointestinal:  Positive for nausea.   Neurological:  Positive for tremors and headaches.   Psychiatric/Behavioral:  Positive for hallucinations.      Disposition: Stepdown Level 1   Historical Information   Past Medical History:  No date: Acute on chronic kidney failure   06/07/2019: Alcohol withdrawal ([facility])  No date: Atrial fibrillation ([facility])  No date: Cancer ([facility])      Comment:  prostate ca,had radiation  No date: Cardiac disease      Comment:  stents,then triple bypass  No date: COPD (chronic obstructive pulmonary disease) ([facility])  No date: Coronary artery disease  No date: ETOH abuse  No date: Heart failure ([facility])  No date: History of heart surgery      Comment:  says [facility]  No date: Hx of heart artery stent      Comment:  2014  No date: Hyperlipidemia  No date: Hypertension  12/22/2019: Hypovolemic shock ([facility])  10/13/2022: Lumbar spondylitis ([facility])  10/10/2022: Nasal bone fracture  No date: Prostate CA ([facility])  No date: S/P CABG x 3      Comment:  2004  No date: Sleep apnea Past Surgical History:  No date: CARDIAC CATHETERIZATION      Comment:  2 stents  2004: CORONARY ARTERY  BYPASS GRAFT  12/16/2020: KS ARTHRD ANT INTERBODY MIN DSC CRV BELOW C2; N/A      Comment:  Procedure: Anterior cervical discectomy with fusion                C4-C7; Posterior cervical decompression and fusion C2-T2;               Surgeon: David Rowell MD;  Location: [facility];                 Service: Neurosurgery  No date: TONSILLECTOMY   Current Outpatient Medications   Medication Instructions    albuterol (Ventolin HFA) 90 mcg/act inhaler 2 puffs, Inhalation, Every 4 hours PRN    apixaban (ELIQUIS) 5 mg, Oral, 2 times daily    aspirin (ECOTRIN LOW STRENGTH) 81 mg, Oral, Daily    Blood Glucose Monitoring Suppl (ONE TOUCH ULTRA MINI) w/Device KIT Use as directed    Blood Pressure Monitoring (Adult Blood Pressure Cuff Lg) KIT Does not apply, Daily    Breo Ellipta 200-25 MCG/ACT inhaler 1 puff, Inhalation, Daily, Rinse mouth after use.    ferrous sulfate 325 mg, Oral, Daily with breakfast    folic acid (FOLVITE) 1 mg tablet TAKE 1 TABLET DAILY    gabapentin (NEURONTIN) 300 mg capsule TAKE ONE CAPSULE THREE TIMES DAILY    lisinopril (ZESTRIL) 10 mg, Oral, Daily    magnesium Oxide (MAG-OX) 400 mg TABS TAKE 1 TABLET TWO TIMES A DAY    metFORMIN (GLUCOPHAGE) 500 mg, Oral, Daily with breakfast    metoprolol tartrate (LOPRESSOR) 25 mg, Oral, Every 12 hours scheduled    naltrexone (REVIA) 50 mg, Oral, Daily    nicotine (NICODERM CQ) 21 mg/24 hr TD 24 hr patch 1 patch, Transdermal, Daily    ONETOUCH DELICA LANCETS FINE MISC 3 times daily, Test    pantoprazole (PROTONIX) 40 mg, Oral, 2 times daily    polyethylene glycol (COLYTE) 4000 mL solution 4,000 mL, Oral, Once, Take 4000 mL by mouth once for 1 dose. Use as directed    pravastatin (PRAVACHOL) 40 mg tablet TAKE 1 TABLET DAILY WITH DINNER    ranolazine (RANEXA) 500 mg 12 hr tablet TAKE 1 TABLET EVERY 12 HOURS    sodium chloride 2 g, Oral, 3 times daily    tamsulosin (FLOMAX) 0.4 mg TAKE 1 CAPSULE DAILY    Thiamine HCl (vitamin B-1) 100 MG TABS TAKE 1 TABLET DAILY     varenicline (CHANTIX) 1 mg, Oral, 2 times daily    No Known Allergies   Social History     Tobacco Use    Smoking status: Every Day     Current packs/day: 1.50     Average packs/day: 1.5 packs/day for 40.0 years (60.0 ttl pk-yrs)     Types: Cigarettes    Smokeless tobacco: Never   Vaping Use    Vaping status: Never Used   Substance Use Topics    Alcohol use: Not Currently     Alcohol/week: 4.0 standard drinks of alcohol     Types: 4 Standard drinks or equivalent per week     Comment: quart of vodka daily    Drug use: No    Family History   Problem Relation Age of Onset    Diabetes Mother     Uterine cancer Mother     COPD Father     Hypertension Father         Objective                            Vitals I/O      Most Recent Min/Max in 24hrs   Temp (!) 96.7 °F (35.9 °C) Temp  Min: 96.7 °F (35.9 °C)  Max: 98.3 °F (36.8 °C)   Pulse 76 Pulse  Min: 65  Max: 117   Resp 21 Resp  Min: 15  Max: 21   /65 BP  Min: 92/64  Max: 177/110   O2 Sat 99 % SpO2  Min: 89 %  Max: 99 %      Intake/Output Summary (Last 24 hours) at 1/24/2024 1729  Last data filed at 1/24/2024 0405  Gross per 24 hour   Intake --   Output 600 ml   Net -600 ml       Diet Vlad/CHO Controlled; Consistent Carbohydrate Diet Level 2 (5 carb servings/75 grams CHO/meal)    Invasive Monitoring   N/A        Physical Exam   Physical Exam  Eyes:      Extraocular Movements: Extraocular movements intact.      Pupils: Pupils are equal, round, and reactive to light.      Comments: yellow purulent drainage from B/L eyes with crusting    Skin:     General: Skin is warm.      Capillary Refill: Capillary refill takes less than 2 seconds.   HENT:      Head: Normocephalic and atraumatic.   Neck:      Trachea: Trachea normal.   Cardiovascular:      Rate and Rhythm: Tachycardia present. Rhythm irregularly irregular.      Pulses: Normal pulses.           Radial pulses are 2+ on the right side and 2+ on the left side.        Dorsalis pedis pulses are 2+ on the right side and 2+  on the left side.      Heart sounds: Normal heart sounds, S1 normal and S2 normal.   Musculoskeletal:      Cervical back: Full passive range of motion without pain, normal range of motion and neck supple.      Comments: Normal ROM    Abdominal: General: Bowel sounds are normal.      Palpations: Abdomen is soft.   Constitutional:       General: He is awake.      Appearance: He is well-developed. He is ill-appearing and diaphoretic.   Pulmonary:      Effort: Pulmonary effort is normal.      Breath sounds: Normal breath sounds.   Psychiatric:         Attention and Perception: He perceives visual hallucinations.         Mood and Affect: Affect is labile.         Speech: Speech is tangential.         Behavior: Behavior is agitated.         Cognition and Memory: Cognition is impaired. He exhibits impaired recent memory.         Judgment: Judgment is impulsive and inappropriate.   Neurological:      Mental Status: He is disoriented and confused.      GCS: GCS eye subscore is 4. GCS verbal subscore is 4. GCS motor subscore is 6.            Diagnostic Studies      Imaging: CT head 1/22-negative  CXR 1/22: hazy bibasilar opacity which may represent a combination of layered effusion and parenchymal opacity.    I have personally reviewed pertinent reports.   and I have personally reviewed pertinent films in PACS     Medications:  Scheduled PRN   aspirin, 81 mg, Daily  ciprofloxacin, , TID  ferrous sulfate, 325 mg, Daily With Breakfast  fluticasone-vilanterol, 1 puff, Daily  folic acid, 1 mg, Daily  gabapentin, 300 mg, TID  guaiFENesin, 600 mg, Q12H SEDA  insulin lispro, 1-6 Units, TID AC  insulin lispro, 1-6 Units, HS  magnesium Oxide, 400 mg, BID  metoprolol tartrate, 25 mg, Q12H [facility]  multivitamin-minerals, 1 tablet, Daily  nicotine, 1 patch, Daily  pantoprazole, 40 mg, BID  pravastatin, 40 mg, Daily With Dinner  ranolazine, 500 mg, Q12H  senna-docusate sodium, 1 tablet, BID  sodium chloride, 2 g, TID  tamsulosin, 0.4 mg,  Daily  vitamin B-1, 100 mg, Daily  varenicline, 1 mg, BID      albuterol, 2 puff, Q4H PRN  calcium carbonate, 1,000 mg, Daily PRN  guaiFENesin, 200 mg, Q4H PRN  ipratropium-albuterol, 3 mL, Q6H PRN  ondansetron, 4 mg, Q6H PRN       Continuous          Labs:    CBC    Recent Labs     01/24/24  0756   WBC 4.62   HGB 12.3   HCT 35.5*   PLT 21*     BMP    Recent Labs     01/23/24  0459 01/24/24  0756   SODIUM 135 135   K 4.2 4.2    99   CO2 23 29   AGAP 12 7   BUN 48* 30*   CREATININE 1.47* 1.04   CALCIUM 8.2* 9.0       Coags    Recent Labs     01/23/24  0936   INR 1.08        Additional Electrolytes  Recent Labs     01/23/24  0459 01/24/24  0444 01/24/24  0756   MG 1.6*  --  1.2*   PHOS 3.8 1.7*  --           Blood Gas    No recent results  No recent results LFTs  Recent Labs     01/23/24  0459 01/24/24  0756   ALT 99* 68*   * 92*   ALKPHOS 69 73   ALB 3.5 3.5   TBILI 0.94 0.95       Infectious  No recent results  Glucose  Recent Labs     01/23/24  0459 01/24/24  0756   GLUC 111 114             Critical Care Time Statement: Upon my evaluation, this patient had a high probability of imminent or life-threatening deterioration due to alcohol withdrawal and afib RVR, which required my direct attention, intervention, and personal management.  I spent a total of 60 minutes directly providing critical care services, including interpretation of complex medical databases, evaluating for the presence of life-threatening injuries or illnesses, complex medical decision making (to support/prevent further life-threatening deterioration)., interpretation of hemodynamic data, titration of vasoactive medications, and titration of continuous IV medications (drips). This time is exclusive of procedures, teaching, family meetings, and any prior time recorded by providers other than myself.    CHELSEA Liu

## 2024-01-24 NOTE — ASSESSMENT & PLAN NOTE
Mag level 1 0 likely from alcohol abuse    · Repleted with 2 gram Mg IV  · Check Mg level at 0600  · Monitor/telemetry Wear well-fitted, well-supportive shoes.   Please follow up with your primary provider as you may need physical therapy and/or additional imaging to look at the soft tissue if you continue to have the pain.    You may take over the counter Tylenol (Acetaminophen) as needed, as directed on packaging.

## 2024-01-24 NOTE — ASSESSMENT & PLAN NOTE
Acute on chronic thrombocytopenia   Platelet count 50 on admission, decreased to 21 today  D/C Eliquis   Likely related to alcoholism and alcoholic fatty liver disease with decreased platelet production   Trend CBC daily   Transfuse for platelet count less than 20

## 2024-01-24 NOTE — PHYSICAL THERAPY NOTE
PT cancellation note     01/24/24 0959   Note Type   Note type Cancelled Session   Cancel Reasons Medical status  (PT evaluation cancelled today as pt was just transferred to ICU.)     Cassie Dominguez PT  [id number]

## 2024-01-24 NOTE — ASSESSMENT & PLAN NOTE
Alcohol level 373 on presentation, however patient stated he had not had a drink since the day prior and had signs of alcohol withdrawal with CIWA of 21  quit drinking for approximately 1 month but relapsed again around [place] time when he became very depressed about his son's death which occurred in 2005   Admitted to the floor and started on CIWA protocol, he received in total 24mg of Ativan per protocol since last evening and CIWA score was at 37  Patient diffusely tremulous, tachycardic, agitated, hallucinating, and disoriented. He also endorsed headache and nausea  Given 320mg IV phenobarbitol now, which is 1/3 of patient's 10mg/kg dose  Re-dose as clinically indicated   Trend CIWA score, but d/c CIWA protocol with the usage of phenobarbitol  Aspiration and seizure precautions  Neuro checks q4h  Continue thiamine and folic acid supplementation   Psych and crisis evaluation once medically stabilized

## 2024-01-24 NOTE — ASSESSMENT & PLAN NOTE
Baseline Na high 120s to low 130s since April 2021  Follows with Dr. Dugan, nephro outpatient  Etiology likely alcohol abuse/low solute intake  Continue outpatient regimen of salt tablets 2g TID  Current Na stable at 135

## 2024-01-24 NOTE — ASSESSMENT & PLAN NOTE
Lab Results   Component Value Date    HGBA1C 5.4 01/24/2024       Recent Labs     01/23/24  1132 01/24/24  0848 01/24/24  1100 01/24/24  1529   POCGLU 174* 124 108 108       Blood Sugar Average: Last 72 hrs:  (P) 127    Hold home metformin  Start SSI coverage ACHS  Diabetic diet

## 2024-01-24 NOTE — QUICK NOTE
Attempted to see the patient for psychiatric consultation.  Patient was sleeping.  He would wake up, would mutter softly and then would fall back asleep.  Patient was recently transferred to the ICU for alcohol withdrawal. Per ICU attending patient is currently receiving phenobarbital.  Patient is unable to engage in interview for psychiatric consultation at this time.  Please reach out to [facility] this afternoon for psychiatric consultation if patient's mentation improves. Otherwise I will plan to see patient for psychiatric consultation tomorrow morning.

## 2024-01-24 NOTE — PROGRESS NOTES
"Progress Note - Cardiology   [facility]     Gregg Partida 63 y.o. male MRN: [id number]  : 1960  Unit/Bed#: [address] [place] [address] Encounter: [id number]    Assessment and Plan:   1. Paroxysmal atrial fibrillation with rapid ventricular response: heart rate and improved with sedation for delirium tremors    -   continue Cardizem drip    -   anticoagulation held due to thrombocytopenia    2. Delirium tremors: patient transferred to intensive care unit for sedation    -   patient started on phenobarbital drip    3. Abnormal troponins, non-MI troponin elevation secondary to #1 and 2:  hs trop: 137 (0hr), 110 (2hr), 105 (4hr)    4. Coronary artery disease: history of coronary artery disease with CABG    -   home meds resumed    5. Hypertension: currently stable on admission    6. ENMA/chronic kidney disease: resolved. Creatinine was 1.6 on admission, it is returned to baseline    7. COPD: tobacco abuse    8. Diabetes: hemoglobin A1c 6.1. Manage per primary team    9. Thrombocytopenia: slightly secondary to alcohol use    Subjective / Objective:   Patient seen and examined. He was transferred to the intensive care unit due to delirium tremors. Patient is calmer now since he was started on phenobarbital.    Vitals: Blood pressure 121/82, pulse (!) 110, temperature 97.8 °F (36.6 °C), resp. rate 20, height 6' 2\" (1.88 m), weight 96.2 kg (212 lb), SpO2 93%.  Vitals:    24 1245   Weight: 96.2 kg (212 lb)     Body mass index is 27.22 kg/m².  BP Readings from Last 3 Encounters:   24 121/82   23 (!) 176/80   23 162/88     Orthostatic Blood Pressures      Flowsheet Row Most Recent Value   Blood Pressure 121/82 filed at 2024 0728   Patient Position - Orthostatic VS Lying filed at 2024 1039          I/O          0701   0700  0701   07 07 0700    P.O. 500      I.V. (mL/kg) 715      IV Piggyback 1000      Total Intake(mL/kg) 2215      " Urine (mL/kg/hr) 300 600 (0.3)     Total Output 300 600     Net +1915 -600                  Invasive Devices       Peripheral Intravenous Line  Duration             Peripheral IV 01/22/24 Left Forearm 1 day                      Intake/Output Summary (Last 24 hours) at 1/24/2024 0754  Last data filed at 1/24/2024 0405  Gross per 24 hour   Intake --   Output 600 ml   Net -600 ml         Physical Exam:   Physical Exam  Vitals and nursing note reviewed.   Constitutional:       Appearance: Normal appearance. He is obese. He is ill-appearing and diaphoretic.   HENT:      Right Ear: External ear normal.      Left Ear: External ear normal.   Eyes:      General: No scleral icterus.        Right eye: No discharge.         Left eye: No discharge.   Cardiovascular:      Rate and Rhythm: Tachycardia present. Rhythm irregular.      Pulses: Normal pulses.   Pulmonary:      Effort: Pulmonary effort is normal.      Breath sounds: Normal breath sounds.   Abdominal:      General: Bowel sounds are normal. There is no distension.      Palpations: Abdomen is soft.   Musculoskeletal:      Right lower leg: No edema.      Left lower leg: No edema.   Skin:     General: Skin is warm.      Capillary Refill: Capillary refill takes less than 2 seconds.   Neurological:      General: No focal deficit present.      Mental Status: He is alert.   Psychiatric:         Behavior: Behavior is agitated.         Cognition and Memory: Cognition is impaired. Memory is impaired.         Judgment: Judgment is impulsive and inappropriate.              Medications/ Allergies:     Current Facility-Administered Medications   Medication Dose Route Frequency Provider Last Rate    albuterol  2 puff Inhalation Q4H PRN Bipin Freed MD      apixaban  5 mg Oral BID Bipin Freed MD      aspirin  81 mg Oral Daily Bipin Freed MD      calcium carbonate  1,000 mg Oral Daily PRN Bipin Freed MD      diltiazem  1-15 mg/hr Intravenous Titrated  Bipin Freed MD 10 mg/hr (01/24/24 0530)    ferrous sulfate  325 mg Oral Daily With Breakfast Bipin Freed MD      fluticasone-vilanterol  1 puff Inhalation Daily Bipin Freed MD      folic acid  1 mg Oral Daily Bipin Freed MD      gabapentin  300 mg Oral TID Bipin Freed MD      guaiFENesin  600 mg Oral Q12H SEDA Bipin Freed MD      guaiFENesin  200 mg Oral Q4H PRN Elliott Zimmerman MD      ipratropium-albuterol  3 mL Nebulization Q6H PRN Elliott Zimmerman MD      magnesium Oxide  400 mg Oral BID Bipin Freed MD      metoprolol tartrate  25 mg Oral Q12H SEDA Bipin Freed MD      multivitamin-minerals  1 tablet Oral Daily Bipin Freed MD      naltrexone  50 mg Oral Daily Bipin Freed MD      nicotine  1 patch Transdermal Daily Bipin Freed MD      ondansetron  4 mg Oral Q6H PRN Elliott Zimmerman MD      pantoprazole  40 mg Oral BID Bipin Freed MD      potassium phosphate  21 mmol Intravenous Once Elliott Zimmerman MD      ranolazine  500 mg Oral Q12H Bipin Freed MD      sodium chloride  2 g Oral TID Bipin Freed MD      tamsulosin  0.4 mg Oral Daily Bipin Freed MD      vitamin B-1  100 mg Oral Daily Bipin Freed MD      varenicline  1 mg Oral BID Bipin Freed MD       albuterol, 2 puff, Q4H PRN  calcium carbonate, 1,000 mg, Daily PRN  guaiFENesin, 200 mg, Q4H PRN  ipratropium-albuterol, 3 mL, Q6H PRN  ondansetron, 4 mg, Q6H PRN      No Known Allergies    VTE Pharmacologic Prophylaxis:   Sequential compression device (Venodyne)     Labs:   Troponins:  Results from last 7 days   Lab Units 01/22/24  2318 01/22/24  2122   HSTNI D2 ng/L  --  -27   HSTNI D4 ng/L -32  --      CBC with diff:  Results from last 7 days   Lab Units 01/22/24  1653   WBC Thousand/uL 4.97   HEMOGLOBIN g/dL 15.6   HEMATOCRIT % 43.7   MCV fL 95   PLATELETS Thousands/uL 50*   RBC Million/uL 4.62   MCH pg  33.8   MCHC g/dL 35.7   RDW % 14.3   MPV fL 11.5   NRBC AUTO /100 WBCs 0     CMP:  Results from last 7 days   Lab Units 01/23/24  0459 01/22/24  1653   SODIUM mmol/L 135 135   POTASSIUM mmol/L 4.2 4.2   CHLORIDE mmol/L 100 93*   CO2 mmol/L 23 21   ANION GAP mmol/L 12 21   BUN mg/dL 48* 47*   CREATININE mg/dL 1.47* 1.60*   CALCIUM mg/dL 8.2* 8.6   AST U/L 150* 247*   ALT U/L 99* 142*   ALK PHOS U/L 69 90   TOTAL PROTEIN g/dL 6.3* 7.8   ALBUMIN g/dL 3.5 4.4   TOTAL BILIRUBIN mg/dL 0.94 1.04*   EGFR ml/min/1.73sq m 50 45     Magnesium:  Results from last 7 days   Lab Units 01/23/24  0459 01/22/24  1653   MAGNESIUM mg/dL 1.6* 1.4*     Coags:  Results from last 7 days   Lab Units 01/23/24  0936   INR  1.08     TSH:  Results from last 7 days   Lab Units 01/23/24  0459   TSH 3RD GENERATON uIU/mL 0.424*       Imaging & Testing   I have personally reviewed pertinent reports.    XR chest 1 view portable    Result Date: 1/23/2024  Narrative: CHEST INDICATION:   vomiting. COMPARISON: 12/1/2023 EXAM PERFORMED/VIEWS:  XR CHEST PORTABLE FINDINGS: There are median sternotomy wires indicating prior cardiac surgery. There is hazy bibasilar opacity which may represent a combination of layered effusion and parenchymal opacity. No pneumothorax. Stable cardiomediastinal silhouette.     Impression: There is hazy bibasilar opacity which may represent a combination of layered effusion and parenchymal opacity. Workstation performed: [id number]     CT head without contrast    Result Date: 1/22/2024  Narrative: CT BRAIN - WITHOUT CONTRAST INDICATION:   Head trauma, moderate-severe head trauma. COMPARISON: Head CT 11/17/2023. TECHNIQUE:  CT examination of the brain was performed.  Multiplanar 2D reformatted images were created from the source data. Radiation dose length product (DLP) for this visit:  943 mGy-cm .  This examination, like all CT scans performed in the [facility], was performed utilizing techniques to minimize  "radiation dose exposure, including the use of iterative reconstruction and automated exposure control. IMAGE QUALITY:  Diagnostic. FINDINGS: PARENCHYMA: Decreased attenuation is noted in periventricular and subcortical white matter demonstrating an appearance that is statistically most likely to represent mild microangiopathic change; this appearance is similar when compared to most recent prior examination. No CT signs of acute infarction.  No intracranial mass, mass effect or midline shift.  No acute parenchymal hemorrhage. VENTRICLES AND EXTRA-AXIAL SPACES:  Normal for the patient's age. VISUALIZED ORBITS: Bilateral lens replacements. PARANASAL SINUSES: Moderate mucosal thickening of the visualized the paranasal sinuses. CALVARIUM AND EXTRACRANIAL SOFT TISSUES:  Normal.     Impression: No acute intracranial abnormality. Workstation performed: [id number]        EKG / Monitor: Personally reviewed.    Atrial fibrillation with rapid ventricular response            Abigail TRAN  Cardiology      \"This note was completed in part utilizing [employer] direct voice recognition software.   Grammatical errors, random word insertion, spelling mistakes, and incomplete sentences may be an occasional consequence of the system secondary to software limitations, ambient noise and hardware issues.    Please read the chart carefully and recognize, using context, where substitutions have occurred.  If you have any questions or concerns about the context, text or information contained within the body of this dictation, please contact myself, the provider, for further clarification.\"  "

## 2024-01-25 LAB
ALBUMIN SERPL BCP-MCNC: 3.3 G/DL (ref 3.5–5)
ALP SERPL-CCNC: 65 U/L (ref 34–104)
ALT SERPL W P-5'-P-CCNC: 61 U/L (ref 7–52)
ANION GAP SERPL CALCULATED.3IONS-SCNC: 10 MMOL/L
AST SERPL W P-5'-P-CCNC: 78 U/L (ref 13–39)
BASOPHILS # BLD AUTO: 0.04 THOUSANDS/ÂΜL (ref 0–0.1)
BASOPHILS NFR BLD AUTO: 1 % (ref 0–1)
BILIRUB SERPL-MCNC: 0.77 MG/DL (ref 0.2–1)
BUN SERPL-MCNC: 17 MG/DL (ref 5–25)
CA-I BLD-SCNC: 1.08 MMOL/L (ref 1.12–1.32)
CALCIUM ALBUM COR SERPL-MCNC: 9.1 MG/DL (ref 8.3–10.1)
CALCIUM SERPL-MCNC: 8.5 MG/DL (ref 8.4–10.2)
CHLORIDE SERPL-SCNC: 100 MMOL/L (ref 96–108)
CO2 SERPL-SCNC: 27 MMOL/L (ref 21–32)
CREAT SERPL-MCNC: 0.82 MG/DL (ref 0.6–1.3)
EOSINOPHIL # BLD AUTO: 0.22 THOUSAND/ÂΜL (ref 0–0.61)
EOSINOPHIL NFR BLD AUTO: 4 % (ref 0–6)
ERYTHROCYTE [DISTWIDTH] IN BLOOD BY AUTOMATED COUNT: 14.1 % (ref 11.6–15.1)
GFR SERPL CREATININE-BSD FRML MDRD: 95 ML/MIN/1.73SQ M
GLUCOSE SERPL-MCNC: 107 MG/DL (ref 65–140)
GLUCOSE SERPL-MCNC: 112 MG/DL (ref 65–140)
GLUCOSE SERPL-MCNC: 83 MG/DL (ref 65–140)
GLUCOSE SERPL-MCNC: 88 MG/DL (ref 65–140)
GLUCOSE SERPL-MCNC: 89 MG/DL (ref 65–140)
HCT VFR BLD AUTO: 34.5 % (ref 36.5–49.3)
HGB BLD-MCNC: 11.9 G/DL (ref 12–17)
IMM GRANULOCYTES # BLD AUTO: 0.03 THOUSAND/UL (ref 0–0.2)
IMM GRANULOCYTES NFR BLD AUTO: 1 % (ref 0–2)
INR PPP: 1.03 (ref 0.84–1.19)
LYMPHOCYTES # BLD AUTO: 1.08 THOUSANDS/ÂΜL (ref 0.6–4.47)
LYMPHOCYTES NFR BLD AUTO: 21 % (ref 14–44)
MAGNESIUM SERPL-MCNC: 1.4 MG/DL (ref 1.9–2.7)
MCH RBC QN AUTO: 33.7 PG (ref 26.8–34.3)
MCHC RBC AUTO-ENTMCNC: 34.2 G/DL (ref 31.4–37.4)
MCV RBC AUTO: 99 FL (ref 82–98)
MONOCYTES # BLD AUTO: 0.63 THOUSAND/ÂΜL (ref 0.17–1.22)
MONOCYTES NFR BLD AUTO: 12 % (ref 4–12)
MRSA NOSE QL CULT: NORMAL
NEUTROPHILS # BLD AUTO: 3.2 THOUSANDS/ÂΜL (ref 1.85–7.62)
NEUTS SEG NFR BLD AUTO: 61 % (ref 43–75)
NRBC BLD AUTO-RTO: 0 /100 WBCS
PHOSPHATE SERPL-MCNC: 2.7 MG/DL (ref 2.3–4.1)
PLATELET # BLD AUTO: 24 THOUSANDS/UL (ref 149–390)
PLATELET BLD QL SMEAR: ABNORMAL
POTASSIUM SERPL-SCNC: 3.8 MMOL/L (ref 3.5–5.3)
PROCALCITONIN SERPL-MCNC: 0.13 NG/ML
PROT SERPL-MCNC: 6.2 G/DL (ref 6.4–8.4)
PROTHROMBIN TIME: 13.7 SECONDS (ref 11.6–14.5)
RBC # BLD AUTO: 3.5 MILLION/UL (ref 3.88–5.62)
RBC MORPH BLD: NORMAL
SODIUM SERPL-SCNC: 137 MMOL/L (ref 135–147)
WBC # BLD AUTO: 5.2 THOUSAND/UL (ref 4.31–10.16)

## 2024-01-25 PROCEDURE — 82330 ASSAY OF CALCIUM: CPT | Performed by: NURSE PRACTITIONER

## 2024-01-25 PROCEDURE — 85610 PROTHROMBIN TIME: CPT | Performed by: NURSE PRACTITIONER

## 2024-01-25 PROCEDURE — 83735 ASSAY OF MAGNESIUM: CPT | Performed by: NURSE PRACTITIONER

## 2024-01-25 PROCEDURE — 84145 PROCALCITONIN (PCT): CPT | Performed by: NURSE PRACTITIONER

## 2024-01-25 PROCEDURE — 80053 COMPREHEN METABOLIC PANEL: CPT | Performed by: NURSE PRACTITIONER

## 2024-01-25 PROCEDURE — 82948 REAGENT STRIP/BLOOD GLUCOSE: CPT

## 2024-01-25 PROCEDURE — 85025 COMPLETE CBC W/AUTO DIFF WBC: CPT | Performed by: NURSE PRACTITIONER

## 2024-01-25 PROCEDURE — 99232 SBSQ HOSP IP/OBS MODERATE 35: CPT | Performed by: ANESTHESIOLOGY

## 2024-01-25 PROCEDURE — 84100 ASSAY OF PHOSPHORUS: CPT | Performed by: NURSE PRACTITIONER

## 2024-01-25 RX ORDER — PHENOBARBITAL SODIUM 65 MG/ML
130 INJECTION, SOLUTION INTRAMUSCULAR; INTRAVENOUS ONCE
Status: COMPLETED | OUTPATIENT
Start: 2024-01-25 | End: 2024-01-25

## 2024-01-25 RX ORDER — METOPROLOL TARTRATE 1 MG/ML
5 INJECTION, SOLUTION INTRAVENOUS EVERY 6 HOURS
Status: DISCONTINUED | OUTPATIENT
Start: 2024-01-25 | End: 2024-01-26

## 2024-01-25 RX ORDER — POTASSIUM CHLORIDE 20 MEQ/1
20 TABLET, EXTENDED RELEASE ORAL ONCE
Status: DISCONTINUED | OUTPATIENT
Start: 2024-01-25 | End: 2024-01-25

## 2024-01-25 RX ORDER — CIPROFLOXACIN HYDROCHLORIDE 3.5 MG/ML
1 SOLUTION/ DROPS TOPICAL
Status: DISCONTINUED | OUTPATIENT
Start: 2024-01-25 | End: 2024-01-27 | Stop reason: HOSPADM

## 2024-01-25 RX ORDER — MAGNESIUM SULFATE HEPTAHYDRATE 40 MG/ML
2 INJECTION, SOLUTION INTRAVENOUS ONCE
Status: COMPLETED | OUTPATIENT
Start: 2024-01-25 | End: 2024-01-25

## 2024-01-25 RX ORDER — PHENOBARBITAL SODIUM 65 MG/ML
INJECTION, SOLUTION INTRAMUSCULAR; INTRAVENOUS
Status: DISPENSED
Start: 2024-01-25 | End: 2024-01-25

## 2024-01-25 RX ORDER — POTASSIUM CHLORIDE 14.9 MG/ML
20 INJECTION INTRAVENOUS ONCE
Status: COMPLETED | OUTPATIENT
Start: 2024-01-25 | End: 2024-01-25

## 2024-01-25 RX ORDER — DEXMEDETOMIDINE HYDROCHLORIDE 4 UG/ML
.1-1.2 INJECTION, SOLUTION INTRAVENOUS
Status: DISCONTINUED | OUTPATIENT
Start: 2024-01-25 | End: 2024-01-26

## 2024-01-25 RX ADMIN — METOPROLOL TARTRATE 5 MG: 5 INJECTION INTRAVENOUS at 10:49

## 2024-01-25 RX ADMIN — METOPROLOL TARTRATE 5 MG: 5 INJECTION INTRAVENOUS at 22:03

## 2024-01-25 RX ADMIN — NICOTINE 1 PATCH: 14 PATCH, EXTENDED RELEASE TRANSDERMAL at 09:13

## 2024-01-25 RX ADMIN — CIPROFLOXACIN HYDROCHLORIDE: 3 OINTMENT OPHTHALMIC at 08:21

## 2024-01-25 RX ADMIN — POTASSIUM CHLORIDE 20 MEQ: 14.9 INJECTION, SOLUTION INTRAVENOUS at 10:51

## 2024-01-25 RX ADMIN — DEXMEDETOMIDINE HYDROCHLORIDE 0.8 MCG/KG/HR: 4 INJECTION, SOLUTION INTRAVENOUS at 22:03

## 2024-01-25 RX ADMIN — PHENOBARBITAL SODIUM 130 MG: 65 INJECTION INTRAMUSCULAR; INTRAVENOUS at 14:00

## 2024-01-25 RX ADMIN — CIPROFLOXACIN HYDROCHLORIDE 1 DROP: 3 SOLUTION/ DROPS OPHTHALMIC at 22:03

## 2024-01-25 RX ADMIN — DEXMEDETOMIDINE HYDROCHLORIDE 0.2 MCG/KG/HR: 4 INJECTION, SOLUTION INTRAVENOUS at 14:43

## 2024-01-25 RX ADMIN — METOPROLOL TARTRATE 5 MG: 5 INJECTION INTRAVENOUS at 16:33

## 2024-01-25 RX ADMIN — PHENOBARBITAL SODIUM 320 MG: 65 INJECTION INTRAMUSCULAR; INTRAVENOUS at 02:38

## 2024-01-25 RX ADMIN — DEXMEDETOMIDINE HYDROCHLORIDE 1.2 MCG/KG/HR: 4 INJECTION, SOLUTION INTRAVENOUS at 18:16

## 2024-01-25 RX ADMIN — CIPROFLOXACIN HYDROCHLORIDE 1 DROP: 3 SOLUTION/ DROPS OPHTHALMIC at 13:13

## 2024-01-25 RX ADMIN — MAGNESIUM SULFATE HEPTAHYDRATE 2 G: 40 INJECTION, SOLUTION INTRAVENOUS at 08:21

## 2024-01-25 RX ADMIN — THIAMINE HYDROCHLORIDE: 100 INJECTION, SOLUTION INTRAMUSCULAR; INTRAVENOUS at 13:12

## 2024-01-25 RX ADMIN — CIPROFLOXACIN HYDROCHLORIDE 1 DROP: 3 SOLUTION/ DROPS OPHTHALMIC at 10:49

## 2024-01-25 NOTE — ASSESSMENT & PLAN NOTE
Hx of Afib on Eliquis and Metoprolol outpatient, unclear compliance   Afib RVR on admission given cardizem bolus and started on gtt 10mg/ht  HR better controlled after phenobarb > Cardizem d/c'd    Plan:  Continue PO Metoprolol  Hold Eliquis in setting of thrombocytopenia   Optimize electrolytes  Monitor rhythm on tele   Control symptoms of alcohol withdrawal as above which is likely  of uncontrolled Afib

## 2024-01-25 NOTE — QUICK NOTE
Call to son, Gregg, for update. We discussed his additional doses of phenobarbital overnight and his continued sleep, as well as supportive ICU care. He was appreciative of the update and had no further questions.

## 2024-01-25 NOTE — ASSESSMENT & PLAN NOTE
Acute on chronic thrombocytopenia   Platelet count 50 on admission, decreased to 21     Plan:   Continue to hold Eliquis   Trend CBC daily   Transfuse for platelet count less than 20

## 2024-01-25 NOTE — ASSESSMENT & PLAN NOTE
>>ASSESSMENT AND PLAN FOR ESSENTIAL HYPERTENSION WRITTEN ON 1/24/2024 11:17 PM BY CHELSEA ALONZO    Continue metoprolol, hold lisinopril secondary to resolving ENMA

## 2024-01-25 NOTE — ASSESSMENT & PLAN NOTE
Baseline Na high 120s to low 130s since April 2021  Follows with Dr. Dugan, nephro outpatient  Etiology likely alcohol abuse/low solute intake  Continue outpatient regimen of salt tablets 2g TID

## 2024-01-25 NOTE — ASSESSMENT & PLAN NOTE
Hx of CKD with baseline creat 1.1-1.3  Admission creat 1.60 and received IVF  Current creat 1.0    Plan:  Strict I&O  Trend BUN/creat  Avoid nephrotoxic agents

## 2024-01-25 NOTE — ASSESSMENT & PLAN NOTE
Echo 9/2023: EF 62%, G2DD   from prior 95  (9/2023)  Continue home metoprolol, home lisinopril in setting of resolving ENMA  Strict I&O   Daily weights

## 2024-01-25 NOTE — PHYSICAL THERAPY NOTE
PHYSICAL THERAPY     01/25/24 1351   Note Type   Note type Cancelled Session   Cancel Reasons Other  (PT deferred by nursing, patient agitated, combative at times. will re-attempt at a later time as appropriate.)   Licensure   [place] License Number  Heather Dacia PT [id number]

## 2024-01-25 NOTE — ASSESSMENT & PLAN NOTE
Patient with yellow purulent drainage from B/L eyes with crusting   Continue cipro ophthalmic drops TID x 7 days

## 2024-01-25 NOTE — PROGRESS NOTES
[facility]  Progress Note  Name: Gregg Partida I  MRN: [id number]  Unit/Bed#: ICU 10 I Date of Admission: 1/22/2024   Date of Service: 1/25/2024 I Hospital Day: 3    Assessment/Plan   * Alcohol abuse with withdrawal delirium (HCC)  Assessment & Plan  Alcohol level 373 on presentation, however patient stated he had not drank since the day prior and had signs of alcohol withdrawal with CIWA of 21  quit drinking for approximately 1 month but relapsed again around [place] time when he became very depressed about his son's death which occurred in 2005   Admitted to the floor and started on CIWA protocol, he received in total 24mg of Ativan per protocol since last evening and CIWA score was at 37  Patient diffusely tremulous, tachycardic, agitated, hallucinating, and disoriented. He also endorsed headache and nausea  1/24 Transferred to ICU  Received phenobarbitol 15 mg/kg in 4 divided doses    Plan:  Re-dose as clinically indicated   Aspiration and seizure precautions  Neuro checks q4h  Continue thiamine and folic acid supplementation   Psych and crisis evaluation once medically stabilized       Atrial fibrillation with rapid ventricular response (HCC)  Assessment & Plan  Hx of Afib on Eliquis and Metoprolol outpatient, unclear compliance   Afib RVR on admission given cardizem bolus and started on gtt 10mg/ht  HR better controlled after phenobarb > Cardizem d/c'd    Plan:  Continue PO Metoprolol  Hold Eliquis in setting of thrombocytopenia   Optimize electrolytes  Monitor rhythm on tele   Control symptoms of alcohol withdrawal as above which is likely  of uncontrolled Afib    Acute kidney injury superimposed on CKD   Assessment & Plan  Hx of CKD with baseline creat 1.1-1.3  Admission creat 1.60 and received IVF  Current creat 1.0    Plan:  Strict I&O  Trend BUN/creat  Avoid nephrotoxic agents     Bacterial conjunctivitis of both eyes  Assessment & Plan  Patient with yellow purulent  drainage from B/L eyes with crusting   Continue cipro ophthalmic drops TID x 7 days    Thrombocytopenia (HCC)  Assessment & Plan  Acute on chronic thrombocytopenia   Platelet count 50 on admission, decreased to 21     Plan:   Continue to hold Eliquis   Trend CBC daily   Transfuse for platelet count less than 20    Hyponatremia  Assessment & Plan  Baseline Na high 120s to low 130s since April 2021  Follows with Dr. Dugan, nephro outpatient  Etiology likely alcohol abuse/low solute intake  Continue outpatient regimen of salt tablets 2g TID    Hypomagnesemia  Assessment & Plan  Secondary to alcohol abuse  Replete as appropriate   Trend Mg level daily     Fatty liver, alcoholic  Assessment & Plan  Hepatic steatosis on CT A/P 11/30  Mild elevation in LFTs, continue to trend hepatic function panel  Encourage abstinence from alcohol     GERD (gastroesophageal reflux disease)  Assessment & Plan  Continue daily protonix and PRN tums    Dyslipidemia  Assessment & Plan  Continue statin     Nicotine abuse  Assessment & Plan  1.5 PPD x 40 years  Nicotine patch   Smoking cessation education prior to discharge   Continue home chantix     Depression, recurrent ([facility])  Assessment & Plan  No home medications, self medicates with alcohol  Psych consult     COPD (chronic obstructive pulmonary disease) ([facility])  Assessment & Plan  No PFTs or pulm notes on file   Not in exacerbation   Current smoker, 1.5 PPD x 40 years    Plan:  Encourage smoking cessation  Continue home Breo daily  PRN duo-nebs      Hypothyroidism  Assessment & Plan  Not on medication  TSH 0.42/T4 0.84    Chronic heart failure with preserved ejection fraction (HCC)  Assessment & Plan  Echo 9/2023: EF 62%, G2DD   from prior 95  (9/2023)  Continue home metoprolol, home lisinopril in setting of resolving ENMA  Strict I&O   Daily weights           Troponin level elevated  Assessment & Plan  Trop 137-->110-->105  Non MI troponin elevation in setting of Afib RVR   Inferior  ST depressions on presenting EKG     CAD (coronary artery disease)  Assessment & Plan  Hx of CABG x 3 at [facility]   Continue metoprolol, statin, and ranexa    Essential hypertension  Assessment & Plan  Continue metoprolol, hold lisinopril secondary to resolving ENMA     Type 2 diabetes mellitus (HCC)  Assessment & Plan  Hold home metformin  Start SSI coverage ACHS  Diabetic diet              Disposition: Stepdown Level 1    ICU Core Measures     A: Assess, Prevent, and Manage Pain Has pain been assessed? Yes  Need for changes to pain regimen? No   B: Both SAT/SAT  N/A   C: Choice of Sedation RASS Goal: 0 Alert and Calm  Need for changes to sedation or analgesia regimen? No   D: Delirium CAM-ICU: Negative   E: Early Mobility  Plan for early mobility? Yes   F: Family Engagement Plan for family engagement today? Yes         Prophylaxis:  VTE VTE covered by:    None       Stress Ulcer  covered bypantoprazole (PROTONIX) 40 mg tablet [[id number]] (Long-Term Med), pantoprazole (PROTONIX) EC tablet 40 mg [[id number]]         Significant 24hr Events     24hr events: Patient was transferred to stepdown unit for phenobarb load for alcohol withdrawal. He received 15 mg/kg in 4 divided doses since transfer.      Subjective   Review of Systems   Unable to perform ROS: Other (confused, agitated)      Objective                            Vitals I/O      Most Recent Min/Max in 24hrs   Temp 97.7 °F (36.5 °C) Temp  Min: 96.7 °F (35.9 °C)  Max: 98.3 °F (36.8 °C)   Pulse 97 Pulse  Min: 67  Max: 124   Resp 20 Resp  Min: 15  Max: 28   /78 BP  Min: 92/64  Max: 177/110   O2 Sat 95 % SpO2  Min: 92 %  Max: 99 %      Intake/Output Summary (Last 24 hours) at 1/25/2024 0228  Last data filed at 1/24/2024 2043  Gross per 24 hour   Intake 700 ml   Output 750 ml   Net -50 ml       Diet Vlad/CHO Controlled; Consistent Carbohydrate Diet Level 2 (5 carb servings/75 grams CHO/meal)    Invasive Monitoring           Physical Exam   Physical  Exam  Vitals reviewed.   Eyes:      Pupils: Pupils are equal, round, and reactive to light.   Skin:     General: Skin is warm.   HENT:      Mouth/Throat:      Mouth: Mucous membranes are moist.   Cardiovascular:      Rate and Rhythm: Regular rhythm. Tachycardia present.      Pulses: Normal pulses.   Abdominal:      Palpations: Abdomen is soft.   Constitutional:       General: He is not in acute distress.     Appearance: He is well-developed. He is ill-appearing and diaphoretic.   Pulmonary:      Effort: Pulmonary effort is normal.   Psychiatric:         Attention and Perception: He perceives visual hallucinations.         Mood and Affect: Affect is labile.         Behavior: Behavior is agitated.   Neurological:      Mental Status: He is confused.            Diagnostic Studies      EKG: sinus tachycardia  Imaging:  I have personally reviewed pertinent reports.       Medications:  Scheduled PRN   aspirin, 81 mg, Daily  ciprofloxacin, , TID  ferrous sulfate, 325 mg, Daily With Breakfast  fluticasone-vilanterol, 1 puff, Daily  folic acid, 1 mg, Daily  gabapentin, 300 mg, TID  guaiFENesin, 600 mg, Q12H SEDA  insulin lispro, 1-6 Units, TID AC  insulin lispro, 1-6 Units, HS  magnesium Oxide, 400 mg, BID  metoprolol tartrate, 25 mg, Q12H SEDA  multivitamin-minerals, 1 tablet, Daily  nicotine, 1 patch, Daily  pantoprazole, 40 mg, BID  PHENobarbital, 320 mg, Once  pravastatin, 40 mg, Daily With Dinner  ranolazine, 500 mg, Q12H  senna-docusate sodium, 1 tablet, BID  sodium chloride, 2 g, TID  tamsulosin, 0.4 mg, Daily  vitamin B-1, 100 mg, Daily  varenicline, 1 mg, BID      albuterol, 2 puff, Q4H PRN  calcium carbonate, 1,000 mg, Daily PRN  guaiFENesin, 200 mg, Q4H PRN  ipratropium-albuterol, 3 mL, Q6H PRN  ondansetron, 4 mg, Q6H PRN       Continuous          Labs:    CBC    Recent Labs     01/24/24  0756   WBC 4.62   HGB 12.3   HCT 35.5*   PLT 21*     BMP    Recent Labs     01/23/24  0459 01/24/24  0756   SODIUM 135 135   K 4.2  4.2    99   CO2 23 29   AGAP 12 7   BUN 48* 30*   CREATININE 1.47* 1.04   CALCIUM 8.2* 9.0       Coags    Recent Labs     01/23/24  0936   INR 1.08        Additional Electrolytes  Recent Labs     01/23/24  0459 01/24/24  0444 01/24/24  0756   MG 1.6*  --  1.2*   PHOS 3.8 1.7*  --           Blood Gas    No recent results  No recent results LFTs  Recent Labs     01/23/24  0459 01/24/24  0756   ALT 99* 68*   * 92*   ALKPHOS 69 73   ALB 3.5 3.5   TBILI 0.94 0.95       Infectious  No recent results  Glucose  Recent Labs     01/23/24  0459 01/24/24  0756   GLUC 111 114               Non-Critical Care Time Statement: I have spent a total time of 25 minutes in caring for this patient including Instructions for management, Patient and family education, Counseling / Coordination of care, Documenting in the medical record, Reviewing / ordering tests, medicine, procedures  , and Obtaining or reviewing history  .     CHELSEA Pabon

## 2024-01-25 NOTE — ASSESSMENT & PLAN NOTE
No PFTs or pulm notes on file   Not in exacerbation   Current smoker, 1.5 PPD x 40 years    Plan:  Encourage smoking cessation  Continue home Breo daily  PRN duo-nebs

## 2024-01-25 NOTE — ASSESSMENT & PLAN NOTE
Alcohol level 373 on presentation, however patient stated he had not drank since the day prior and had signs of alcohol withdrawal with CIWA of 21  quit drinking for approximately 1 month but relapsed again around [place] time when he became very depressed about his son's death which occurred in 2005   Admitted to the floor and started on CIWA protocol, he received in total 24mg of Ativan per protocol since last evening and CIWA score was at 37  Patient diffusely tremulous, tachycardic, agitated, hallucinating, and disoriented. He also endorsed headache and nausea  1/24 Transferred to ICU  Received phenobarbitol 15 mg/kg in 4 divided doses    Plan:  Re-dose as clinically indicated   Aspiration and seizure precautions  Neuro checks q4h  Continue thiamine and folic acid supplementation   Psych and crisis evaluation once medically stabilized

## 2024-01-25 NOTE — QUICK NOTE
Contacted ICU attending regarding update on patient. Per ICU attending, patient is still fairly lethargic this morning. We discussed plan for the team to reach out to psychiatry once patient is able to engage in psychiatric evaluation for psychiatric consultation

## 2024-01-26 LAB
ANION GAP SERPL CALCULATED.3IONS-SCNC: 14 MMOL/L
BUN SERPL-MCNC: 18 MG/DL (ref 5–25)
CALCIUM SERPL-MCNC: 9.3 MG/DL (ref 8.4–10.2)
CHLORIDE SERPL-SCNC: 96 MMOL/L (ref 96–108)
CO2 SERPL-SCNC: 24 MMOL/L (ref 21–32)
CREAT SERPL-MCNC: 0.79 MG/DL (ref 0.6–1.3)
ERYTHROCYTE [DISTWIDTH] IN BLOOD BY AUTOMATED COUNT: 13.9 % (ref 11.6–15.1)
GFR SERPL CREATININE-BSD FRML MDRD: 96 ML/MIN/1.73SQ M
GLUCOSE SERPL-MCNC: 100 MG/DL (ref 65–140)
GLUCOSE SERPL-MCNC: 106 MG/DL (ref 65–140)
GLUCOSE SERPL-MCNC: 111 MG/DL (ref 65–140)
GLUCOSE SERPL-MCNC: 112 MG/DL (ref 65–140)
GLUCOSE SERPL-MCNC: 77 MG/DL (ref 65–140)
HCT VFR BLD AUTO: 42 % (ref 36.5–49.3)
HGB BLD-MCNC: 14.6 G/DL (ref 12–17)
MAGNESIUM SERPL-MCNC: 1.4 MG/DL (ref 1.9–2.7)
MCH RBC QN AUTO: 33.7 PG (ref 26.8–34.3)
MCHC RBC AUTO-ENTMCNC: 34.8 G/DL (ref 31.4–37.4)
MCV RBC AUTO: 97 FL (ref 82–98)
PHOSPHATE SERPL-MCNC: 2.7 MG/DL (ref 2.3–4.1)
PLATELET # BLD AUTO: 41 THOUSANDS/UL (ref 149–390)
PMV BLD AUTO: 12.4 FL (ref 8.9–12.7)
POTASSIUM SERPL-SCNC: 3.7 MMOL/L (ref 3.5–5.3)
RBC # BLD AUTO: 4.33 MILLION/UL (ref 3.88–5.62)
SODIUM SERPL-SCNC: 134 MMOL/L (ref 135–147)
WBC # BLD AUTO: 6.11 THOUSAND/UL (ref 4.31–10.16)

## 2024-01-26 PROCEDURE — 80048 BASIC METABOLIC PNL TOTAL CA: CPT | Performed by: NURSE PRACTITIONER

## 2024-01-26 PROCEDURE — 85027 COMPLETE CBC AUTOMATED: CPT | Performed by: NURSE PRACTITIONER

## 2024-01-26 PROCEDURE — 84100 ASSAY OF PHOSPHORUS: CPT | Performed by: NURSE PRACTITIONER

## 2024-01-26 PROCEDURE — 99232 SBSQ HOSP IP/OBS MODERATE 35: CPT | Performed by: ANESTHESIOLOGY

## 2024-01-26 PROCEDURE — 82948 REAGENT STRIP/BLOOD GLUCOSE: CPT

## 2024-01-26 PROCEDURE — 97110 THERAPEUTIC EXERCISES: CPT

## 2024-01-26 PROCEDURE — 97163 PT EVAL HIGH COMPLEX 45 MIN: CPT

## 2024-01-26 PROCEDURE — 83735 ASSAY OF MAGNESIUM: CPT | Performed by: NURSE PRACTITIONER

## 2024-01-26 PROCEDURE — 99232 SBSQ HOSP IP/OBS MODERATE 35: CPT | Performed by: INTERNAL MEDICINE

## 2024-01-26 RX ORDER — FOLIC ACID 1 MG/1
1 TABLET ORAL DAILY
Status: DISCONTINUED | OUTPATIENT
Start: 2024-01-27 | End: 2024-01-27 | Stop reason: HOSPADM

## 2024-01-26 RX ORDER — LISINOPRIL 10 MG/1
10 TABLET ORAL DAILY
Status: DISCONTINUED | OUTPATIENT
Start: 2024-01-27 | End: 2024-01-27 | Stop reason: HOSPADM

## 2024-01-26 RX ORDER — LANOLIN ALCOHOL/MO/W.PET/CERES
100 CREAM (GRAM) TOPICAL DAILY
Status: DISCONTINUED | OUTPATIENT
Start: 2024-01-27 | End: 2024-01-27 | Stop reason: HOSPADM

## 2024-01-26 RX ORDER — POTASSIUM CHLORIDE 14.9 MG/ML
20 INJECTION INTRAVENOUS
Status: COMPLETED | OUTPATIENT
Start: 2024-01-26 | End: 2024-01-26

## 2024-01-26 RX ORDER — MAGNESIUM SULFATE HEPTAHYDRATE 40 MG/ML
2 INJECTION, SOLUTION INTRAVENOUS ONCE
Status: COMPLETED | OUTPATIENT
Start: 2024-01-26 | End: 2024-01-26

## 2024-01-26 RX ADMIN — PRAVASTATIN SODIUM 40 MG: 40 TABLET ORAL at 16:41

## 2024-01-26 RX ADMIN — SODIUM CHLORIDE 2 G: 1 TABLET ORAL at 16:41

## 2024-01-26 RX ADMIN — GABAPENTIN 300 MG: 300 CAPSULE ORAL at 21:39

## 2024-01-26 RX ADMIN — CIPROFLOXACIN HYDROCHLORIDE 1 DROP: 3 SOLUTION/ DROPS OPHTHALMIC at 14:53

## 2024-01-26 RX ADMIN — METOPROLOL TARTRATE 5 MG: 5 INJECTION INTRAVENOUS at 10:47

## 2024-01-26 RX ADMIN — CIPROFLOXACIN HYDROCHLORIDE 1 DROP: 3 SOLUTION/ DROPS OPHTHALMIC at 21:41

## 2024-01-26 RX ADMIN — METOPROLOL TARTRATE 5 MG: 5 INJECTION INTRAVENOUS at 16:41

## 2024-01-26 RX ADMIN — DOCUSATE SODIUM AND SENNOSIDES 1 TABLET: 8.6; 5 TABLET, FILM COATED ORAL at 18:15

## 2024-01-26 RX ADMIN — DEXMEDETOMIDINE HYDROCHLORIDE 0.7 MCG/KG/HR: 4 INJECTION, SOLUTION INTRAVENOUS at 04:48

## 2024-01-26 RX ADMIN — PANTOPRAZOLE SODIUM 40 MG: 40 TABLET, DELAYED RELEASE ORAL at 21:39

## 2024-01-26 RX ADMIN — DEXMEDETOMIDINE HYDROCHLORIDE 0.6 MCG/KG/HR: 4 INJECTION, SOLUTION INTRAVENOUS at 11:34

## 2024-01-26 RX ADMIN — MAGNESIUM SULFATE HEPTAHYDRATE 2 G: 40 INJECTION, SOLUTION INTRAVENOUS at 08:14

## 2024-01-26 RX ADMIN — POTASSIUM CHLORIDE 20 MEQ: 14.9 INJECTION, SOLUTION INTRAVENOUS at 10:32

## 2024-01-26 RX ADMIN — METOPROLOL TARTRATE 25 MG: 25 TABLET, FILM COATED ORAL at 21:39

## 2024-01-26 RX ADMIN — ANTACID TABLETS 1000 MG: 500 TABLET, CHEWABLE ORAL at 18:19

## 2024-01-26 RX ADMIN — CIPROFLOXACIN HYDROCHLORIDE 1 DROP: 3 SOLUTION/ DROPS OPHTHALMIC at 10:46

## 2024-01-26 RX ADMIN — GABAPENTIN 300 MG: 300 CAPSULE ORAL at 16:41

## 2024-01-26 RX ADMIN — NICOTINE 1 PATCH: 14 PATCH, EXTENDED RELEASE TRANSDERMAL at 08:22

## 2024-01-26 RX ADMIN — SODIUM CHLORIDE 2 G: 1 TABLET ORAL at 21:39

## 2024-01-26 RX ADMIN — Medication 400 MG: at 18:15

## 2024-01-26 RX ADMIN — POTASSIUM CHLORIDE 20 MEQ: 14.9 INJECTION, SOLUTION INTRAVENOUS at 08:15

## 2024-01-26 RX ADMIN — THIAMINE HYDROCHLORIDE: 100 INJECTION, SOLUTION INTRAMUSCULAR; INTRAVENOUS at 10:32

## 2024-01-26 RX ADMIN — METOPROLOL TARTRATE 5 MG: 5 INJECTION INTRAVENOUS at 04:48

## 2024-01-26 RX ADMIN — RANOLAZINE 500 MG: 500 TABLET, EXTENDED RELEASE ORAL at 19:51

## 2024-01-26 RX ADMIN — CIPROFLOXACIN HYDROCHLORIDE 1 DROP: 3 SOLUTION/ DROPS OPHTHALMIC at 18:15

## 2024-01-26 RX ADMIN — CIPROFLOXACIN HYDROCHLORIDE 1 DROP: 3 SOLUTION/ DROPS OPHTHALMIC at 05:58

## 2024-01-26 NOTE — PHYSICAL THERAPY NOTE
"       PHYSICAL THERAPY EVALUATION/TREATMENT     01/26/24 1440   PT Last Visit   PT Visit Date 01/26/24   Note Type   Note type Evaluation   Pain Assessment   Pain Assessment Tool Medeiros-Baker FACES   Medeiros-Baker FACES Pain Rating 4  (Generalized ache all over as per patient)   Restrictions/Precautions   Other Precautions Cognitive;Chair Alarm;Bed Alarm;Multiple lines;Fall Risk;Pain  (Seen in ICU)   Home Living   Type of Home Apartment   Home Layout One level;Elevator   Home Equipment Walker   Additional Comments Patient states using a roller walker at times if he has not been mobile for a couple days otherwise independent without assistive device prior to admission   Prior Function   Level of [place] Independent with ADLs;Independent with functional mobility;Independent with IADLS   Lives With Alone   Falls in the last 6 months 1 to 4   Comments Patient states typically being independent although not driving   General   Additional Pertinent History Chart reviewed, patient admitted with alcohol abuse with withdrawal, delirium.  Patient now presents with improving mentation although agitated at times and short tempered as well as confused   Family/Caregiver Present No   Cognition   Overall Cognitive Status Impaired   Arousal/Participation Alert  (Cooperative although agitated at times)   Orientation Level Oriented to person;Oriented to place   Following Commands Follows one step commands with increased time or repetition   Comments Patient required repetitive commands and increased time to complete all activities   Subjective   Subjective I just want to go home\"   RLE Assessment   RLE Assessment   (Range of motion within functional limits, strength 3+/5)   LLE Assessment   LLE Assessment   (Range of motion within functional limits, strength 3+/5)   Coordination   Movements are Fluid and Coordinated 0   Coordination and Movement Description Decreased coordination with transfers bed mobility and gait activity.  " Tremulous activity noted in bilateral lower and upper extremities   Bed Mobility   Supine to Sit 3  Moderate assistance   Additional items Assist x 1   Sit to Supine 4  Minimal assistance   Additional items Assist x 1;Verbal cues;LE management   Transfers   Sit to Stand 3  Moderate assistance   Additional items Assist x 1;Verbal cues;Impulsive   Stand to Sit 3  Moderate assistance   Additional items Assist x 1;Verbal cues;Impulsive   Ambulation/Elevation   Gait Assistance   (Mod to max)   Additional items Assist x 1;Verbal cues;Tactile cues   Assistive Device Rolling walker   Distance 5 feet with assist for walker guidance and proper walker usage for safety and fall prevention.  Ataxic gait patterning noted with tremors in both lower extremities   Balance   Static Sitting Fair   Dynamic Sitting Fair -   Static Standing Poor +   Dynamic Standing Poor   Ambulatory Poor   Activity Tolerance   Activity Tolerance Patient limited by fatigue;Treatment limited secondary to medical complications (Comment);Other (Comment)  (Limited by cognitive deficits/confusion)   Nurse Made Aware Yes   Assessment   Prognosis Good   Problem List Decreased strength;Decreased range of motion;Decreased endurance;Impaired balance;Decreased mobility;Decreased coordination;Decreased cognition;Impaired judgement;Decreased safety awareness;Pain   Assessment Patient seen for Physical Therapy evaluation. Patient admitted with Alcohol abuse with withdrawal delirium (HCC).  Comorbidities affecting patient's physical performance include: .  Personal factors affecting patient at time of initial evaluation include: inability to ambulate household distances, inability to navigate community distances, inability to navigate level surfaces without external assistance, inability to perform dynamic tasks in community, limited home support, positive fall history, inability to perform physical activity, limited insight into impairments, inability to perform ADLS,  and inability to perform IADLS . Prior to admission, patient was independent with functional mobility without assistive device, independent with ADLS, independent with IADLS, ambulating household distance, and ambulating community distances.  Please find objective findings from Physical Therapy assessment regarding body systems outlined above with impairments and limitations including weakness, decreased ROM, impaired balance, decreased endurance, impaired coordination, gait deviations, pain, decreased activity tolerance, decreased functional mobility tolerance, decreased safety awareness, impaired judgement, and fall risk.  The Barthel Index was used as a functional outcome tool presenting with a score of Barthel Index Score: 25 today indicating marked limitations of functional mobility and ADLS.  Patient's clinical presentation is currently unstable/unpredictable as seen in patient's presentation of vital sign response, changing level of pain, varying levels of cognitive performance, increased fall risk, new onset of impairment of functional mobility, decreased endurance, and new onset of weakness. Pt would benefit from continued Physical Therapy treatment to address deficits as defined above and maximize level of functional mobility. As demonstrated by objective findings, the assigned level of complexity for this evaluation is high.The patient's -[facility] Basic Mobility Inpatient Short Form Raw Score is 12. A Raw score of less than or equal to 16 suggests the patient may benefit from discharge to post-acute rehabilitation services. Please also refer to the recommendation of the Physical Therapist for safe discharge planning.   Goals   Patient Goals To go home   STG Expiration Date 02/02/24   Short Term Goal #1 Transfers and gait with roller walker with min assist   Short Term Goal #2 Gait endurance to 50 feet   LTG Expiration Date 02/09/24   Long Term Goal #1 Strength bilateral lower extremities 3+/4 -   Long Term  Goal #2 Independent transfers and gait with roller walker for functional household distances   Plan   Treatment/Interventions ADL retraining;Functional transfer training;LE strengthening/ROM;Therapeutic exercise;Endurance training;Cognitive reorientation;Patient/family training;Equipment eval/education;Bed mobility;Gait training;Compensatory technique education   PT Frequency Other (Comment)  (5 times per week)   Discharge Recommendation   Rehab Resource Intensity Level, PT II (Moderate Resource Intensity)   AM-PAC Basic Mobility Inpatient   Turning in Flat Bed Without Bedrails 3   Lying on Back to Sitting on Edge of Flat Bed Without Bedrails 2   Moving Bed to Chair 2   Standing Up From Chair Using Arms 2   Walk in Room 2   Climb 3-5 Stairs With Railing 1   Basic Mobility Inpatient Raw Score 12   Basic Mobility Standardized Score 32.23   Highest Level Of Mobility   -[facility] Goal 4: Move to chair/commode   -HLM Achieved 6: Walk 10 steps or more   Barthel Index   Feeding 5   Bathing 0   Grooming Score 0   Dressing Score 5   Bladder Score 0   Bowels Score 5   Toilet Use Score 5   Transfers (Bed/Chair) Score 5   Mobility (Level Surface) Score 0   Stairs Score 0   Barthel Index Score 25   Additional Treatment Session   Start Time 1425   End Time 1440   Treatment Assessment s: Patient with pain/ache all over and lightheaded with sitting O: Bilateral lower extremity exercise completed as listed below A: Patient will benefit from continued physical therapy with progression as tolerated and increasing functional mobility with clinical staff as well   Exercises   Hamstring Stretch Supine;5 reps;PROM;Bilateral   Heelslides Supine;10 reps;AAROM;Bilateral   Hip Flexion Sitting;5 reps;Bilateral  (Alternating)   Knee AROM Long Arc Quad Sitting;5 reps;Bilateral  (Alternating)   Ankle Pumps Supine;5 reps;Bilateral   Heel Cord Stretch Supine;5 reps;PROM;Bilateral   Balance training  sidestepping for balance and coordination    Licensure   [place] License Number  Heather Dacia PT [id number]

## 2024-01-26 NOTE — ASSESSMENT & PLAN NOTE
Hx of CKD with baseline creat 1.1-1.3  Admission creat 1.60 and received IVF  Current creat 0.8    Plan:  Strict I&O  Trend BUN/creat  Avoid nephrotoxic agents

## 2024-01-26 NOTE — ASSESSMENT & PLAN NOTE
>>ASSESSMENT AND PLAN FOR ESSENTIAL HYPERTENSION WRITTEN ON 1/25/2024 11:45 PM BY CHELSEA WOMACK    Continue metoprolol currently transitioned to IV  Continue lisinopril when tolerating PO

## 2024-01-26 NOTE — PROGRESS NOTES
"Progress Note - Cardiology   [facility]     Gregg Partida 63 y.o. male MRN: [id number]  : 1960  Unit/Bed#: ICU 10 Encounter: [id number]    Assessment and Plan:   1. Paroxysmal atrial fibrillation with rapid ventricular response: heart rate and improved with sedation for delirium tremors    -   anticoagulation held due to thrombocytopenia    -   Cardizem drip was discontinued and patient is now on IV Lopressor 5 mg Q6 hours    -   heart rate remains in atrial fibrillation flutter/90s ~100     2. Delirium tremors: patient transferred to intensive care unit for sedation    -   patient s on precedent strip     3. Abnormal troponins, non-MI troponin elevation secondary to #1 and 2:  hs trop: 137 (0hr), 110 (2hr), 105 (4hr)     4. Coronary artery disease: history of coronary artery disease with CABG    -   home meds resumed     5. Hypertension: currently stable on admission     6. ENMA/chronic kidney disease: resolved. Creatinine was 1.6 on admission, it is returned to baseline     7. COPD: tobacco abuse     8. Diabetes: hemoglobin A1c 6.1. Manage per primary team     9. Thrombocytopenia: slightly secondary to alcohol use    Subjective / Objective:   Patient still being managed her delirium tremors and is sedated. Will continue to monitor    Vitals: Blood pressure (!) 149/107, pulse 102, temperature (!) 97 °F (36.1 °C), temperature source Temporal, resp. rate 20, height 6' 2\" (1.88 m), weight 88.9 kg (195 lb 15.8 oz), SpO2 95%.  Vitals:    24 1245 24 0910   Weight: 96.2 kg (212 lb) 88.9 kg (195 lb 15.8 oz)     Body mass index is 25.16 kg/m².  BP Readings from Last 3 Encounters:   24 (!) 149/107   23 (!) 176/80   23 162/88     Orthostatic Blood Pressures      Flowsheet Row Most Recent Value   Blood Pressure 149/107 filed at 2024 0715   Patient Position - Orthostatic VS Lying filed at 2024 0715          I/O          0701   0700  " 0701  01/26 0700 01/26 0701  01/27 0700    P.O. 310      I.V. (mL/kg)  286.5 (3.2)     IV Piggyback 550      Total Intake(mL/kg) 860 (9.7) 286.5 (3.2)     Urine (mL/kg/hr) 450 (0.2) 600 (0.3)     Total Output 450 600     Net +410 -313.5            Unmeasured Urine Occurrence 3 x 5 x           Invasive Devices       Peripheral Intravenous Line  Duration             Peripheral IV 01/22/24 Left;Lower Forearm 3 days    Peripheral IV 01/24/24 Left;Upper Forearm 2 days    Peripheral IV 01/24/24 Right Forearm 2 days              Drain  Duration             External Urinary Catheter Medium <1 day                      Intake/Output Summary (Last 24 hours) at 1/26/2024 1022  Last data filed at 1/26/2024 0600  Gross per 24 hour   Intake 286.52 ml   Output 600 ml   Net -313.48 ml         Physical Exam:   Physical Exam  Vitals and nursing note reviewed.   Constitutional:       Interventions: He is sedated.   HENT:      Right Ear: External ear normal.      Left Ear: External ear normal.   Eyes:      General: No scleral icterus.        Right eye: No discharge.         Left eye: No discharge.   Cardiovascular:      Rate and Rhythm: Tachycardia present. Rhythm irregular.      Pulses: Normal pulses.   Pulmonary:      Effort: Pulmonary effort is normal. No accessory muscle usage or respiratory distress.      Breath sounds: Examination of the right-lower field reveals decreased breath sounds. Examination of the left-lower field reveals decreased breath sounds. Decreased breath sounds present.   Abdominal:      General: Bowel sounds are normal. There is no distension.      Palpations: Abdomen is soft.   Musculoskeletal:      Right lower leg: No edema.      Left lower leg: No edema.   Skin:     General: Skin is warm.      Capillary Refill: Capillary refill takes less than 2 seconds.   Neurological:      Mental Status: Mental status is at baseline.   Psychiatric:         Mood and Affect: Mood normal.                Medications/ Allergies:      Current Facility-Administered Medications   Medication Dose Route Frequency Provider Last Rate    aspirin  81 mg Oral Daily CHELSEA Hodge      calcium carbonate  1,000 mg Oral Daily PRN CHELSEA Hodge      ciprofloxacin  1 drop Both Eyes Q4H While Awake CHELSEA Whitlock      dexmedetomidine  0.1-1.2 mcg/kg/hr Intravenous Titrated CHELSEA Whitlock 0.7 mcg/kg/hr (01/26/24 0448)    ferrous sulfate  325 mg Oral Daily With Breakfast CHELSEA Hodge      fluticasone-vilanterol  1 puff Inhalation Daily CHELSEA Hodge      folic acid 1 mg, thiamine (VITAMIN B1) 100 mg in sodium chloride 0.9 % 100 mL IV piggyback   Intravenous Daily CHELSEA Whitlock      gabapentin  300 mg Oral TID CHELSEA Hodge      guaiFENesin  600 mg Oral Q12H SEDA CHELSEA Hodge      insulin lispro  1-6 Units Subcutaneous TID AC CHELSEA Hodge      insulin lispro  1-6 Units Subcutaneous HS CHELSEA Hodge      ipratropium-albuterol  3 mL Nebulization Q6H PRN CHELSEA Hodge      magnesium Oxide  400 mg Oral BID CHELSEA Hodge      metoprolol  5 mg Intravenous Q6H CHELSEA Whitlock      multivitamin-minerals  1 tablet Oral Daily CHELSEA Hodge      nicotine  1 patch Transdermal Daily CHELSEA Hodge      ondansetron  4 mg Intravenous Q6H PRN CHELSEA Hodge      pantoprazole  40 mg Oral BID CHELSEA Hodge      potassium chloride  20 mEq Intravenous Q2H CHELSEA Whitlock 20 mEq (01/26/24 0815)    pravastatin  40 mg Oral Daily With Dinner CHELSEA Hodge      ranolazine  500 mg Oral Q12H CHELSEA Hodge      senna-docusate sodium  1 tablet Oral BID CHELSEA Hodge      sodium chloride  2 g Oral TID CHELSEA Hodge      tamsulosin  0.4 mg Oral Daily CHELSEA Hodge      varenicline  1 mg Oral BID Radha  Spironello V, CRNP       calcium carbonate, 1,000 mg, Daily PRN  ipratropium-albuterol, 3 mL, Q6H PRN  ondansetron, 4 mg, Q6H PRN      No Known Allergies    VTE Pharmacologic Prophylaxis:   Sequential compression device (Venodyne)     Labs:   Troponins:  Results from last 7 days   Lab Units 01/22/24  2318 01/22/24  2122   HSTNI D2 ng/L  --  -27   HSTNI D4 ng/L -32  --      CBC with diff:  Results from last 7 days   Lab Units 01/26/24  0000 01/25/24  0629 01/24/24  0756 01/22/24  1653   WBC Thousand/uL 6.11 5.20 4.62 4.97   HEMOGLOBIN g/dL 14.6 11.9* 12.3 15.6   HEMATOCRIT % 42.0 34.5* 35.5* 43.7   MCV fL 97 99* 99* 95   PLATELETS Thousands/uL 41* 24* 21* 50*   RBC Million/uL 4.33 3.50* 3.60* 4.62   MCH pg 33.7 33.7 32.8 33.8   MCHC g/dL 34.8 34.2 33.2 35.7   RDW % 13.9 14.1 14.1 14.3   MPV fL 12.4  --  11.8 11.5   NRBC AUTO /100 WBCs  --  0 0 0     CMP:  Results from last 7 days   Lab Units 01/26/24  0557 01/25/24  0629 01/24/24  0756 01/23/24  0459 01/22/24  1653   SODIUM mmol/L 134* 137 135 135 135   POTASSIUM mmol/L 3.7 3.8 4.2 4.2 4.2   CHLORIDE mmol/L 96 100 99 100 93*   CO2 mmol/L 24 27 29 23 21   ANION GAP mmol/L 14 10 7 12 21   BUN mg/dL 18 17 30* 48* 47*   CREATININE mg/dL 0.79 0.82 1.04 1.47* 1.60*   CALCIUM mg/dL 9.3 8.5 9.0 8.2* 8.6   AST U/L  --  78* 92* 150* 247*   ALT U/L  --  61* 68* 99* 142*   ALK PHOS U/L  --  65 73 69 90   TOTAL PROTEIN g/dL  --  6.2* 6.5 6.3* 7.8   ALBUMIN g/dL  --  3.3* 3.5 3.5 4.4   TOTAL BILIRUBIN mg/dL  --  0.77 0.95 0.94 1.04*   EGFR ml/min/1.73sq m 96 95 77 50 45     Magnesium:  Results from last 7 days   Lab Units 01/26/24  0557 01/25/24  0629 01/24/24  0756 01/23/24  0459 01/22/24  1653   MAGNESIUM mg/dL 1.4* 1.4* 1.2* 1.6* 1.4*     Coags:  Results from last 7 days   Lab Units 01/25/24  0629 01/23/24  0936   INR  1.03 1.08     TSH:  Results from last 7 days   Lab Units 01/23/24  0459   TSH 3RD GENERATON uIU/mL 0.424*     Hgb A1c:  Results from last 7 days   Lab Units  01/24/24  0756   HEMOGLOBIN A1C % 5.4       Imaging & Testing   I have personally reviewed pertinent reports.    XR chest 1 view portable    Result Date: 1/23/2024  Narrative: CHEST INDICATION:   vomiting. COMPARISON: 12/1/2023 EXAM PERFORMED/VIEWS:  XR CHEST PORTABLE FINDINGS: There are median sternotomy wires indicating prior cardiac surgery. There is hazy bibasilar opacity which may represent a combination of layered effusion and parenchymal opacity. No pneumothorax. Stable cardiomediastinal silhouette.     Impression: There is hazy bibasilar opacity which may represent a combination of layered effusion and parenchymal opacity. Workstation performed: [id number]     CT head without contrast    Result Date: 1/22/2024  Narrative: CT BRAIN - WITHOUT CONTRAST INDICATION:   Head trauma, moderate-severe head trauma. COMPARISON: Head CT 11/17/2023. TECHNIQUE:  CT examination of the brain was performed.  Multiplanar 2D reformatted images were created from the source data. Radiation dose length product (DLP) for this visit:  943 mGy-cm .  This examination, like all CT scans performed in the [facility], was performed utilizing techniques to minimize radiation dose exposure, including the use of iterative reconstruction and automated exposure control. IMAGE QUALITY:  Diagnostic. FINDINGS: PARENCHYMA: Decreased attenuation is noted in periventricular and subcortical white matter demonstrating an appearance that is statistically most likely to represent mild microangiopathic change; this appearance is similar when compared to most recent prior examination. No CT signs of acute infarction.  No intracranial mass, mass effect or midline shift.  No acute parenchymal hemorrhage. VENTRICLES AND EXTRA-AXIAL SPACES:  Normal for the patient's age. VISUALIZED ORBITS: Bilateral lens replacements. PARANASAL SINUSES: Moderate mucosal thickening of the visualized the paranasal sinuses. CALVARIUM AND EXTRACRANIAL SOFT  "TISSUES:  Normal.     Impression: No acute intracranial abnormality. Workstation performed: [id number]        EKG / Monitor: Personally reviewed.    Atrial fibrillation flutter at a rate of 90 ~100        Abigail TRAN  Cardiology      \"This note was completed in part utilizing [employer] direct voice recognition software.   Grammatical errors, random word insertion, spelling mistakes, and incomplete sentences may be an occasional consequence of the system secondary to software limitations, ambient noise and hardware issues.    Please read the chart carefully and recognize, using context, where substitutions have occurred.  If you have any questions or concerns about the context, text or information contained within the body of this dictation, please contact myself, the provider, for further clarification.\"  "

## 2024-01-26 NOTE — ASSESSMENT & PLAN NOTE
Acute on chronic thrombocytopenia   Platelet count 50 on admission, decreased to aleah of 21  Now improving to 24    Plan:   Continue to hold Eliquis   Trend CBC daily   Transfuse for platelet count less than 20  Monitor for signs/symptoms of bleeding

## 2024-01-26 NOTE — ASSESSMENT & PLAN NOTE
Alcohol level 373 on presentation, however patient stated he had not drank since the day prior and had signs of alcohol withdrawal with CIWA of 21  quit drinking for approximately 1 month but relapsed again around [place] time when he became very depressed about his son's death which occurred in 2005   Admitted to the floor and started on CIWA protocol, he received in total 24mg of Ativan per protocol since last evening and CIWA score was at 37. Patient diffusely tremulous, tachycardic, agitated, hallucinating, and disoriented. He also endorsed headache and nausea  1/24 Transferred to ICU  Received phenobarbitol 15 mg/kg in 4 divided doses  1/25 given additional 150mg phenobarb and started on precedex     Plan:  Attempt to wean precedex today   Aspiration and seizure precautions  Neuro checks q4h  Continue thiamine and folic acid supplementation   Psych and crisis evaluation once medically stabilized

## 2024-01-26 NOTE — ASSESSMENT & PLAN NOTE
Hx of CABG x 3 at [facility]   Continue metoprolol IV for now while NPO, transition to oral when able   Continue statin and ranexa when tolerating PO

## 2024-01-26 NOTE — PROGRESS NOTES
[facility]  Progress Note  Name: Gregg Partida I  MRN: [id number]  Unit/Bed#: ICU 10 I Date of Admission: 1/22/2024   Date of Service: 1/26/2024 I Hospital Day: 4    Assessment/Plan   * Alcohol abuse with withdrawal delirium (HCC)  Assessment & Plan  Alcohol level 373 on presentation, however patient stated he had not drank since the day prior and had signs of alcohol withdrawal with CIWA of 21  quit drinking for approximately 1 month but relapsed again around [place] time when he became very depressed about his son's death which occurred in 2005   Admitted to the floor and started on CIWA protocol, he received in total 24mg of Ativan per protocol since last evening and CIWA score was at 37. Patient diffusely tremulous, tachycardic, agitated, hallucinating, and disoriented. He also endorsed headache and nausea  1/24 Transferred to ICU  Received phenobarbitol 15 mg/kg in 4 divided doses  1/25 given additional 150mg phenobarb and started on precedex     Plan:  Attempt to wean precedex today   Aspiration and seizure precautions  Neuro checks q4h  Continue thiamine and folic acid supplementation   Psych and crisis evaluation once medically stabilized       Atrial fibrillation with rapid ventricular response (HCC)  Assessment & Plan  Hx of Afib on Eliquis and Metoprolol outpatient, unclear compliance   Afib RVR on admission given cardizem bolus and started on gtt 10mg/ht  HR better controlled after phenobarb > Cardizem d/c'd    Plan:  Continue PO Metoprolol  Hold Eliquis in setting of thrombocytopenia   Optimize electrolytes  Monitor rhythm on tele   Control symptoms of alcohol withdrawal as above which is likely  of uncontrolled Afib    Acute kidney injury superimposed on CKD   Assessment & Plan  Hx of CKD with baseline creat 1.1-1.3  Admission creat 1.60 and received IVF  Current creat 0.8    Plan:  Strict I&O  Trend BUN/creat  Avoid nephrotoxic agents     Bacterial conjunctivitis of  both eyes  Assessment & Plan  Patient with yellow purulent drainage from B/L eyes with crusting   Continue cipro ophthalmic drops TID x 7 days    Thrombocytopenia (HCC)  Assessment & Plan  Acute on chronic thrombocytopenia   Platelet count 50 on admission, decreased to aleah of 21  Now improving to 24    Plan:   Continue to hold Eliquis   Trend CBC daily   Transfuse for platelet count less than 20  Monitor for signs/symptoms of bleeding     Hyponatremia  Assessment & Plan  Baseline Na high 120s to low 130s since April 2021  Follows with Dr. Dugan, nephro outpatient  Etiology likely alcohol abuse/low solute intake  Continue outpatient regimen of salt tablets 2g TID    Hypomagnesemia  Assessment & Plan  Secondary to alcohol abuse  Replete as appropriate   Trend Mg level daily     Fatty liver, alcoholic  Assessment & Plan  Hepatic steatosis on CT A/P 11/30  Mild elevation in LFTs, continue to trend hepatic function panel  Encourage abstinence from alcohol     GERD (gastroesophageal reflux disease)  Assessment & Plan  Continue daily protonix and PRN tums    Dyslipidemia  Assessment & Plan  Continue statin     Nicotine abuse  Assessment & Plan  1.5 PPD x 40 years  Nicotine patch   Smoking cessation education prior to discharge   Continue home chantix     Depression, recurrent (HCC)  Assessment & Plan  No home medications, self medicates with alcohol  Psych consult     COPD (chronic obstructive pulmonary disease) (HCC)  Assessment & Plan  No PFTs or pulm notes on file   Not in exacerbation   Current smoker, 1.5 PPD x 40 years    Plan:  Encourage smoking cessation  Continue home Breo daily  PRN duo-nebs      Hypothyroidism  Assessment & Plan  Not on medication  TSH 0.42/T4 0.84    Chronic heart failure with preserved ejection fraction (HCC)  Assessment & Plan  Echo 9/2023: EF 62%, G2DD   from prior 95  (9/2023)  Continue home metoprolol, home lisinopril in setting of resolving ENMA  Strict I&O   Daily weights            Troponin level elevated  Assessment & Plan  Trop 137-->110-->105  Non MI troponin elevation in setting of Afib RVR   Inferior ST depressions on presenting EKG     CAD (coronary artery disease)  Assessment & Plan  Hx of CABG x 3 at [facility]   Continue metoprolol IV for now while NPO, transition to oral when able   Continue statin and ranexa when tolerating PO    Essential hypertension  Assessment & Plan  Continue metoprolol currently transitioned to IV  Continue lisinopril when tolerating PO     Type 2 diabetes mellitus (HCC)  Assessment & Plan  Hold home metformin  Start SSI coverage ACHS  Diabetic diet                Disposition: Stepdown Level 1    ICU Core Measures     A: Assess, Prevent, and Manage Pain Has pain been assessed? Yes  Need for changes to pain regimen? No   B: Both SAT/SAT  N/A   C: Choice of Sedation RASS Goal: 0 Alert and Calm  Need for changes to sedation or analgesia regimen? Yes   D: Delirium CAM-ICU: Unable to perform secondary to Acute cognitive dysfunction   E: Early Mobility  Plan for early mobility? Yes   F: Family Engagement Plan for family engagement today? Yes         Prophylaxis:  VTE VTE covered by:    None       Stress Ulcer  covered bypantoprazole (PROTONIX) 40 mg tablet [[id number]] (Long-Term Med), pantoprazole (PROTONIX) EC tablet 40 mg [[id number]]         Significant 24hr Events     24hr events: Somnolent yesterday morning. Woke up yesterday afternoon and was confused, attempting to get out of bed. Given an additional 150mg of phenobarb and started on precedex infusion. 1:1 required for safety. Overnight, precedex was gradually titrated down.      Subjective   Review of Systems   Unable to perform ROS: Mental status change      Objective                            Vitals I/O      Most Recent Min/Max in 24hrs   Temp 98.1 °F (36.7 °C) Temp  Min: 97.4 °F (36.3 °C)  Max: 98.1 °F (36.7 °C)   Pulse 89 Pulse  Min: 87  Max: 129   Resp 21 Resp  Min: 16  Max: 45   BP (!)  152/109 BP  Min: 125/84  Max: 223/174   O2 Sat 95 % SpO2  Min: 90 %  Max: 97 %      Intake/Output Summary (Last 24 hours) at 1/26/2024 0059  Last data filed at 1/25/2024 0600  Gross per 24 hour   Intake 160 ml   Output --   Net 160 ml       Diet Vlad/CHO Controlled; Consistent Carbohydrate Diet Level 2 (5 carb servings/75 grams CHO/meal)    Invasive Monitoring           Physical Exam   Physical Exam  Eyes:      Extraocular Movements: Extraocular movements intact.      Pupils: Pupils are equal, round, and reactive to light.   Skin:     General: Skin is warm and dry.   HENT:      Head: Normocephalic and atraumatic.      Mouth/Throat:      Mouth: Mucous membranes are dry.   Cardiovascular:      Rate and Rhythm: Normal rate and regular rhythm.   Musculoskeletal:      Right lower leg: No edema.      Left lower leg: No edema.   Abdominal: General: Bowel sounds are normal.      Palpations: Abdomen is soft.      Tenderness: There is no abdominal tenderness.   Constitutional:       General: He is not in acute distress.     Appearance: He is well-developed and well-nourished. He is ill-appearing. He is not toxic-appearing.      Interventions: He is sedated.   Pulmonary:      Effort: Tachypnea present. No respiratory distress.      Breath sounds: Normal breath sounds.   Neurological:      GCS: GCS eye subscore is 2. GCS verbal subscore is 2. GCS motor subscore is 5.            Diagnostic Studies      EKG: Afib with HR 90s  Imaging:  I have personally reviewed pertinent reports.   and I have personally reviewed pertinent films in PACS     Medications:  Scheduled PRN   aspirin, 81 mg, Daily  ciprofloxacin, 1 drop, Q4H While Awake  ferrous sulfate, 325 mg, Daily With Breakfast  fluticasone-vilanterol, 1 puff, Daily  folic acid 1 mg, thiamine (VITAMIN B1) 100 mg in sodium chloride 0.9 % 100 mL IV piggyback, , Daily  gabapentin, 300 mg, TID  guaiFENesin, 600 mg, Q12H SEDA  insulin lispro, 1-6 Units, TID AC  insulin lispro, 1-6 Units,  HS  magnesium Oxide, 400 mg, BID  metoprolol, 5 mg, Q6H  multivitamin-minerals, 1 tablet, Daily  nicotine, 1 patch, Daily  pantoprazole, 40 mg, BID  pravastatin, 40 mg, Daily With Dinner  ranolazine, 500 mg, Q12H  senna-docusate sodium, 1 tablet, BID  sodium chloride, 2 g, TID  tamsulosin, 0.4 mg, Daily  varenicline, 1 mg, BID      calcium carbonate, 1,000 mg, Daily PRN  ipratropium-albuterol, 3 mL, Q6H PRN  ondansetron, 4 mg, Q6H PRN       Continuous    dexmedetomidine, 0.1-1.2 mcg/kg/hr, Last Rate: 0.7 mcg/kg/hr (01/26/24 0005)         Labs:    CBC    Recent Labs     01/24/24  0756 01/25/24  0629   WBC 4.62 5.20   HGB 12.3 11.9*   HCT 35.5* 34.5*   PLT 21* 24*     BMP    Recent Labs     01/24/24  0756 01/25/24  0629   SODIUM 135 137   K 4.2 3.8   CL 99 100   CO2 29 27   AGAP 7 10   BUN 30* 17   CREATININE 1.04 0.82   CALCIUM 9.0 8.5       Coags    Recent Labs     01/25/24  0629   INR 1.03        Additional Electrolytes  Recent Labs     01/24/24  0444 01/24/24  0756 01/25/24  0629   MG  --  1.2* 1.4*   PHOS 1.7*  --  2.7   CAIONIZED  --   --  1.08*          Blood Gas    No recent results  No recent results LFTs  Recent Labs     01/24/24  0756 01/25/24  0629   ALT 68* 61*   AST 92* 78*   ALKPHOS 73 65   ALB 3.5 3.3*   TBILI 0.95 0.77       Infectious  Recent Labs     01/25/24  0629   PROCALCITONI 0.13     Glucose  Recent Labs     01/24/24  0756 01/25/24  0629   GLUC 114 88               Non-Critical Care Time Statement: I have spent a total time of 30 minutes in caring for this patient including Documenting in the medical record and Reviewing / ordering tests, medicine, procedures  .     CHELSEA Christopher

## 2024-01-26 NOTE — QUICK NOTE
Called patient's son to provide daily update. We discussed weaning precedex to off, hopeful diet advancement. He was appreciative and is aware Gregg is awaiting consults from Crisis and psychiatry.

## 2024-01-27 VITALS
DIASTOLIC BLOOD PRESSURE: 66 MMHG | OXYGEN SATURATION: 93 % | HEIGHT: 74 IN | BODY MASS INDEX: 25.04 KG/M2 | SYSTOLIC BLOOD PRESSURE: 110 MMHG | RESPIRATION RATE: 23 BRPM | WEIGHT: 195.11 LBS | TEMPERATURE: 97.3 F | HEART RATE: 89 BPM

## 2024-01-27 PROBLEM — N17.9 ACUTE KIDNEY INJURY SUPERIMPOSED ON CKD: Status: RESOLVED | Noted: 2019-06-07 | Resolved: 2024-01-27

## 2024-01-27 PROBLEM — N18.9 ACUTE KIDNEY INJURY SUPERIMPOSED ON CKD: Status: RESOLVED | Noted: 2019-06-07 | Resolved: 2024-01-27

## 2024-01-27 LAB
ANION GAP SERPL CALCULATED.3IONS-SCNC: 8 MMOL/L
BUN SERPL-MCNC: 27 MG/DL (ref 5–25)
CALCIUM SERPL-MCNC: 8.4 MG/DL (ref 8.4–10.2)
CHLORIDE SERPL-SCNC: 98 MMOL/L (ref 96–108)
CO2 SERPL-SCNC: 26 MMOL/L (ref 21–32)
CREAT SERPL-MCNC: 0.97 MG/DL (ref 0.6–1.3)
ERYTHROCYTE [DISTWIDTH] IN BLOOD BY AUTOMATED COUNT: 14 % (ref 11.6–15.1)
GFR SERPL CREATININE-BSD FRML MDRD: 83 ML/MIN/1.73SQ M
GLUCOSE SERPL-MCNC: 101 MG/DL (ref 65–140)
GLUCOSE SERPL-MCNC: 134 MG/DL (ref 65–140)
GLUCOSE SERPL-MCNC: 96 MG/DL (ref 65–140)
HCT VFR BLD AUTO: 37.3 % (ref 36.5–49.3)
HGB BLD-MCNC: 13.1 G/DL (ref 12–17)
MAGNESIUM SERPL-MCNC: 1.5 MG/DL (ref 1.9–2.7)
MCH RBC QN AUTO: 33.7 PG (ref 26.8–34.3)
MCHC RBC AUTO-ENTMCNC: 35.1 G/DL (ref 31.4–37.4)
MCV RBC AUTO: 96 FL (ref 82–98)
PHOSPHATE SERPL-MCNC: 2.9 MG/DL (ref 2.3–4.1)
PLATELET # BLD AUTO: 54 THOUSANDS/UL (ref 149–390)
PMV BLD AUTO: 10.8 FL (ref 8.9–12.7)
POTASSIUM SERPL-SCNC: 4 MMOL/L (ref 3.5–5.3)
RBC # BLD AUTO: 3.89 MILLION/UL (ref 3.88–5.62)
SODIUM SERPL-SCNC: 132 MMOL/L (ref 135–147)
WBC # BLD AUTO: 6.69 THOUSAND/UL (ref 4.31–10.16)

## 2024-01-27 PROCEDURE — NC001 PR NO CHARGE: Performed by: NURSE PRACTITIONER

## 2024-01-27 PROCEDURE — 84100 ASSAY OF PHOSPHORUS: CPT | Performed by: NURSE PRACTITIONER

## 2024-01-27 PROCEDURE — 82948 REAGENT STRIP/BLOOD GLUCOSE: CPT

## 2024-01-27 PROCEDURE — 97530 THERAPEUTIC ACTIVITIES: CPT

## 2024-01-27 PROCEDURE — 85027 COMPLETE CBC AUTOMATED: CPT | Performed by: NURSE PRACTITIONER

## 2024-01-27 PROCEDURE — 99239 HOSP IP/OBS DSCHRG MGMT >30: CPT | Performed by: INTERNAL MEDICINE

## 2024-01-27 PROCEDURE — 83735 ASSAY OF MAGNESIUM: CPT | Performed by: NURSE PRACTITIONER

## 2024-01-27 PROCEDURE — 80048 BASIC METABOLIC PNL TOTAL CA: CPT | Performed by: NURSE PRACTITIONER

## 2024-01-27 RX ORDER — MAGNESIUM SULFATE HEPTAHYDRATE 40 MG/ML
4 INJECTION, SOLUTION INTRAVENOUS ONCE
Status: COMPLETED | OUTPATIENT
Start: 2024-01-27 | End: 2024-01-27

## 2024-01-27 RX ORDER — AMOXICILLIN 250 MG
1 CAPSULE ORAL DAILY PRN
Status: ON HOLD
Start: 2024-01-27

## 2024-01-27 RX ORDER — LANOLIN ALCOHOL/MO/W.PET/CERES
6 CREAM (GRAM) TOPICAL
Status: DISCONTINUED | OUTPATIENT
Start: 2024-01-27 | End: 2024-01-27 | Stop reason: HOSPADM

## 2024-01-27 RX ADMIN — SODIUM CHLORIDE 2 G: 1 TABLET ORAL at 08:34

## 2024-01-27 RX ADMIN — ONDANSETRON 4 MG: 2 INJECTION INTRAMUSCULAR; INTRAVENOUS at 09:29

## 2024-01-27 RX ADMIN — TAMSULOSIN HYDROCHLORIDE 0.4 MG: 0.4 CAPSULE ORAL at 08:35

## 2024-01-27 RX ADMIN — ASPIRIN 81 MG CHEWABLE TABLET 81 MG: 81 TABLET CHEWABLE at 08:33

## 2024-01-27 RX ADMIN — Medication 400 MG: at 08:35

## 2024-01-27 RX ADMIN — PANTOPRAZOLE SODIUM 40 MG: 40 TABLET, DELAYED RELEASE ORAL at 08:33

## 2024-01-27 RX ADMIN — RANOLAZINE 500 MG: 500 TABLET, EXTENDED RELEASE ORAL at 08:30

## 2024-01-27 RX ADMIN — GABAPENTIN 300 MG: 300 CAPSULE ORAL at 08:33

## 2024-01-27 RX ADMIN — Medication 6 MG: at 00:50

## 2024-01-27 RX ADMIN — CIPROFLOXACIN HYDROCHLORIDE 1 DROP: 3 SOLUTION/ DROPS OPHTHALMIC at 06:22

## 2024-01-27 RX ADMIN — CIPROFLOXACIN HYDROCHLORIDE 1 DROP: 3 SOLUTION/ DROPS OPHTHALMIC at 09:11

## 2024-01-27 RX ADMIN — FLUTICASONE FUROATE AND VILANTEROL TRIFENATATE 1 PUFF: 200; 25 POWDER RESPIRATORY (INHALATION) at 08:39

## 2024-01-27 RX ADMIN — MAGNESIUM SULFATE HEPTAHYDRATE 4 G: 40 INJECTION, SOLUTION INTRAVENOUS at 08:42

## 2024-01-27 RX ADMIN — LISINOPRIL 10 MG: 10 TABLET ORAL at 08:29

## 2024-01-27 RX ADMIN — NICOTINE 1 PATCH: 14 PATCH, EXTENDED RELEASE TRANSDERMAL at 08:36

## 2024-01-27 RX ADMIN — THIAMINE HCL TAB 100 MG 100 MG: 100 TAB at 08:34

## 2024-01-27 RX ADMIN — Medication 1 TABLET: at 08:34

## 2024-01-27 RX ADMIN — FOLIC ACID 1 MG: 1 TABLET ORAL at 08:35

## 2024-01-27 RX ADMIN — METOPROLOL TARTRATE 25 MG: 25 TABLET, FILM COATED ORAL at 08:34

## 2024-01-27 RX ADMIN — DOCUSATE SODIUM AND SENNOSIDES 1 TABLET: 8.6; 5 TABLET, FILM COATED ORAL at 08:34

## 2024-01-27 RX ADMIN — FERROUS SULFATE TAB 325 MG (65 MG ELEMENTAL FE) 325 MG: 325 (65 FE) TAB at 08:30

## 2024-01-27 NOTE — PHYSICAL THERAPY NOTE
"   PT TREATMENT     01/27/24 1120   PT Last Visit   PT Visit Date 01/27/24   Note Type   Note Type Treatment   Pain Assessment   Pain Assessment Tool 0-10   Pain Score No Pain   Restrictions/Precautions   Other Precautions Fall Risk;Multiple lines;Telemetry;Bed Alarm;Chair Alarm;Cognitive;Impulsive   General   Chart Reviewed Yes   Cognition   Overall Cognitive Status Impaired   Arousal/Participation Cooperative   Orientation Level Oriented X4   Following Commands Follows one step commands with increased time or repetition   Subjective   Subjective \"I want to go home today\"   Bed Mobility   Supine to Sit 5  Supervision   Additional items Verbal cues  (for safety)   Transfers   Sit to Stand   (min assist progressing to supervision)   Stand to Sit 5  Supervision   Stand pivot   (min assist progressing to supervision)   Ambulation/Elevation   Gait pattern Steppage;Wide BIN   Gait Assistance   (min assist progressing to supervision)   Additional items Verbal cues  (safety)   Assistive Device Rolling walker   Distance 15 feet, 75 feet, 125 feet   Balance   Static Sitting Fair +   Static Standing Fair   Ambulatory Fair   Activity Tolerance   Activity Tolerance Patient tolerated treatment well   Assessment   Prognosis Good   Problem List Decreased endurance;Impaired balance;Decreased mobility;Decreased cognition   Assessment Patient demonstrates good progress towards all goals status post withdrawal delirium from alcohol abuse.  Patient demonstrates much improved ability to ambulate with a rolling walker as patient is able to ambulate 100 feet with supervision assist.  Patient does need cues for safety and is impulsive.  Anticipate with continued physical therapy and mobilization by nursing staff patient will return to his prior functional level.    The patient's AM-PAC Basic Mobility Inpatient Short Form Raw Score is 18. A Raw score of greater than 16 suggests the patient may benefit from discharge to home. Please also " refer to the recommendation of the Physical Therapist for safe discharge planning.         Plan   Treatment/Interventions Endurance training;Therapeutic exercise;LE strengthening/ROM;Functional transfer training;ADL retraining;Gait training;Bed mobility;Equipment eval/education;Patient/family training   Progress Progressing toward goals   PT Frequency Other (Comment)  (5x/week)   Discharge Recommendation   Rehab Resource Intensity Level, PT III (Minimum Resource Intensity)  (recommend 24 hour supervision for safety upon DC)   Equipment Recommended Other (Comment)  (Pt has  a rolling walker)   AM-PAC Basic Mobility Inpatient   Turning in Flat Bed Without Bedrails 4   Lying on Back to Sitting on Edge of Flat Bed Without Bedrails 3   Moving Bed to Chair 3   Standing Up From Chair Using Arms 3   Walk in Room 3   Climb 3-5 Stairs With Railing 2   Basic Mobility Inpatient Raw Score 18   Basic Mobility Standardized Score 41.05   Highest Level Of Mobility   JH-HLM Goal 6: Walk 10 steps or more   JH-HLM Achieved 7: Walk 25 feet or more   End of Consult   Patient Position at End of Consult All needs within reach;Bed/Chair alarm activated;Bedside chair   Licensure   [place] License Number  Cassie Dominguez PT [id number]

## 2024-01-27 NOTE — PLAN OF CARE
Problem: PAIN - ADULT  Goal: Verbalizes/displays adequate comfort level or baseline comfort level  Description: Interventions:  - Encourage patient to monitor pain and request assistance  - Assess pain using appropriate pain scale  - Administer analgesics based on type and severity of pain and evaluate response  - Implement non-pharmacological measures as appropriate and evaluate response  - Consider cultural and social influences on pain and pain management  - Notify physician/advanced practitioner if interventions unsuccessful or patient reports new pain  Outcome: Progressing     Problem: INFECTION - ADULT  Goal: Absence or prevention of progression during hospitalization  Description: INTERVENTIONS:  - Assess and monitor for signs and symptoms of infection  - Monitor lab/diagnostic results  - Monitor all insertion sites, i.e. indwelling lines, tubes, and drains  - [place] appropriate cooling/warming therapies per order  - Administer medications as ordered  - Instruct and encourage patient and family to use good hand hygiene technique  - Identify and instruct in appropriate isolation precautions for identified infection/condition  Outcome: Progressing     Problem: SAFETY ADULT  Goal: Patient will remain free of falls  Description: INTERVENTIONS:  - Educate patient/family on patient safety including physical limitations  - Instruct patient to call for assistance with activity   - Consult OT/PT to assist with strengthening/mobility   - Keep Call bell within reach  - Keep bed low and locked with side rails adjusted as appropriate  - Keep care items and personal belongings within reach  - Initiate and maintain comfort rounds  - Make Fall Risk Sign visible to staff  - Initiate/Maintain bed alarm  - Obtain necessary fall risk management equipment: n/a  - Apply yellow socks and bracelet for high fall risk patients  - Consider moving patient to room near nurses station  Outcome: Progressing  Goal: Maintain or return  to baseline ADL function  Description: INTERVENTIONS:  -  Assess patient's ability to carry out ADLs; assess patient's baseline for ADL function and identify physical deficits which impact ability to perform ADLs (bathing, care of mouth/teeth, toileting, grooming, dressing, etc.)  - Assess/evaluate cause of self-care deficits   - Assess range of motion  - Assess patient's mobility; develop plan if impaired  - Assess patient's need for assistive devices and provide as appropriate  - Encourage maximum independence but intervene and supervise when necessary  - Involve family in performance of ADLs  - Assess for home care needs following discharge   - Consider OT consult to assist with ADL evaluation and planning for discharge  - Provide patient education as appropriate  Outcome: Progressing  Goal: Maintains/Returns to pre admission functional level  Description: INTERVENTIONS:  - Perform AM-PAC 6 Click Basic Mobility/ Daily Activity assessment daily.  - Set and communicate daily mobility goal to care team and patient/family/caregiver.   - Collaborate with rehabilitation services on mobility goals if consulted  - Out of bed for toileting  - Record patient progress and toleration of activity level   Outcome: Progressing     Problem: DISCHARGE PLANNING  Goal: Discharge to home or other facility with appropriate resources  Description: INTERVENTIONS:  - Identify barriers to discharge w/patient and caregiver  - Arrange for needed discharge resources and transportation as appropriate  - Identify discharge learning needs (meds, wound care, etc.)  - Arrange for interpretive services to assist at discharge as needed  - Refer to Case Management Department for coordinating discharge planning if the patient needs post-hospital services based on physician/advanced practitioner order or complex needs related to functional status, cognitive ability, or social support system  Outcome: Progressing     Problem: Knowledge Deficit  Goal:  Patient/family/caregiver demonstrates understanding of disease process, treatment plan, medications, and discharge instructions  Description: Complete learning assessment and assess knowledge base.  Interventions:  - Provide teaching at level of understanding  - Provide teaching via preferred learning methods  Outcome: Progressing     Problem: NEUROSENSORY - ADULT  Goal: Achieves maximal functionality and self care  Description: INTERVENTIONS  - Monitor swallowing and airway patency with patient fatigue and changes in neurological status  - Encourage and assist patient to increase activity and self care.   - Encourage visually impaired, hearing impaired and aphasic patients to use assistive/communication devices  Outcome: Progressing     Problem: CARDIOVASCULAR - ADULT  Goal: Maintains optimal cardiac output and hemodynamic stability  Description: INTERVENTIONS:  - Monitor I/O, vital signs and rhythm  - Monitor for S/S and trends of decreased cardiac output  - Administer and titrate ordered vasoactive medications to optimize hemodynamic stability  - Assess quality of pulses, skin color and temperature  - Assess for signs of decreased coronary artery perfusion  - Instruct patient to report change in severity of symptoms  Outcome: Progressing  Goal: Absence of cardiac dysrhythmias or at baseline rhythm  Description: INTERVENTIONS:  - Continuous cardiac monitoring, vital signs, obtain 12 lead EKG if ordered  - Administer antiarrhythmic and heart rate control medications as ordered  - Monitor electrolytes and administer replacement therapy as ordered  Outcome: Progressing     Problem: RESPIRATORY - ADULT  Goal: Achieves optimal ventilation and oxygenation  Description: INTERVENTIONS:  - Assess for changes in respiratory status  - Assess for changes in mentation and behavior  - Position to facilitate oxygenation and minimize respiratory effort  - Oxygen administered by appropriate delivery if ordered  - Initiate smoking  cessation education as indicated  - Encourage broncho-pulmonary hygiene including cough, deep breathe, Incentive Spirometry  - Assess the need for suctioning and aspirate as needed  - Assess and instruct to report SOB or any respiratory difficulty  - Respiratory Therapy support as indicated  Outcome: Progressing     Problem: GENITOURINARY - ADULT  Goal: Maintains or returns to baseline urinary function  Description: INTERVENTIONS:  - Assess urinary function  - Encourage oral fluids to ensure adequate hydration if ordered  - Administer IV fluids as ordered to ensure adequate hydration  - Administer ordered medications as needed  - Offer frequent toileting  - Follow urinary retention protocol if ordered  Outcome: Progressing     Problem: METABOLIC, FLUID AND ELECTROLYTES - ADULT  Goal: Electrolytes maintained within normal limits  Description: INTERVENTIONS:  - Monitor labs and assess patient for signs and symptoms of electrolyte imbalances  - Administer electrolyte replacement as ordered  - Monitor response to electrolyte replacements, including repeat lab results as appropriate  - Instruct patient on fluid and nutrition as appropriate  Outcome: Progressing  Goal: Fluid balance maintained  Description: INTERVENTIONS:  - Monitor labs   - Monitor I/O and WT  - Instruct patient on fluid and nutrition as appropriate  - Assess for signs & symptoms of volume excess or deficit  Outcome: Progressing  Goal: Glucose maintained within target range  Description: INTERVENTIONS:  - Monitor Blood Glucose as ordered  - Assess for signs and symptoms of hyperglycemia and hypoglycemia  - Administer ordered medications to maintain glucose within target range  - Assess nutritional intake and initiate nutrition service referral as needed  Outcome: Progressing     Problem: SKIN/TISSUE INTEGRITY - ADULT  Goal: Skin Integrity remains intact(Skin Breakdown Prevention)  Outcome: Progressing     Problem: MUSCULOSKELETAL - ADULT  Goal: Maintain  or return mobility to safest level of function  Description: INTERVENTIONS:  - Assess patient's ability to carry out ADLs; assess patient's baseline for ADL function and identify physical deficits which impact ability to perform ADLs (bathing, care of mouth/teeth, toileting, grooming, dressing, etc.)  - Assess/evaluate cause of self-care deficits   - Assess range of motion  - Assess patient's mobility  - Assess patient's need for assistive devices and provide as appropriate  - Encourage maximum independence but intervene and supervise when necessary  - Involve family in performance of ADLs  - Assess for home care needs following discharge   - Consider OT consult to assist with ADL evaluation and planning for discharge  - Provide patient education as appropriate  Outcome: Progressing     Problem: Prexisting or High Potential for Compromised Skin Integrity  Goal: Skin integrity is maintained or improved  Description: INTERVENTIONS:  - Identify patients at risk for skin breakdown  - Assess and monitor skin integrity  - Assess and monitor nutrition and hydration status  - Monitor labs   - Assess for incontinence   - Turn and reposition patient  - Assist with mobility/ambulation  - Relieve pressure over bony prominences  - Avoid friction and shearing  - Provide appropriate hygiene as needed including keeping skin clean and dry  - Evaluate need for skin moisturizer/barrier cream  - Collaborate with interdisciplinary team   - Patient/family teaching  - Consider wound care consult   Outcome: Progressing

## 2024-01-27 NOTE — DISCHARGE SUMMARY
Discharge Summary - [facility]   Patient Information: Gregg Partida 63 y.o. male MRN: [id number]  Unit/Bed#: ICU 10 Encounter: [id number]     Admitting Physician: Angel Bauer MD  Discharging Physician/Practitioner: No att. providers found   PCP: Korin Lo MD  Admission Date:   Admission Orders (From admission, onward)       Ordered        01/22/24 1826  INPATIENT ADMISSION  Once                          Discharge Date: 01/27/24      Patient left AMA.    Reason for Admission:  Hypomagnesemia [E83.42]  Alcohol intoxication (HCC) [F10.929]  Acute alcohol intoxication (HCC) [F10.929]  Elevated troponin [R79.89]  ENMA (acute kidney injury) (HCC) [N17.9]  Atrial fibrillation with rapid ventricular response (HCC) [I48.91]     Discharge Diagnoses:      Principal Problem:    Alcohol abuse with withdrawal delirium (HCC)  Active Problems:    COPD (chronic obstructive pulmonary disease) (HCC)    CAD (coronary artery disease)    Type 2 diabetes mellitus (HCC)    Essential hypertension    Hypomagnesemia    Depression, recurrent (HCC)    Dyslipidemia    Nicotine abuse    Thrombocytopenia (HCC)    Hyponatremia    Chronic heart failure with preserved ejection fraction (HCC)    Fatty liver, alcoholic    Paroxysmal atrial fibrillation (HCC)    GERD (gastroesophageal reflux disease)    Hypothyroidism    Troponin level elevated    Bacterial conjunctivitis of both eyes  Resolved Problems:    Acute kidney injury superimposed on CKD        Consultations During Hospital Stay:  ·       Cardiology, Psychiatry     Procedures Performed:   ·       None     Significant Findings / Test Results:   CT head: No acute intracranial abnormality.   CXR:There is hazy bibasilar opacity which may represent a combination of layered effusion and parenchymal opacity.     Incidental Findings:   ·       None     Test Results Pending at Discharge (will require follow up):   ·       None     Outpatient  Tests Requested:  ·       CBC and CMP in 3 days     Outpatient follow-up Requested:  ·       [facility]    Complications:  None    Things to address at first visit after hospitalization   Alcohol rehab and abstinence  CBC and CMP in 3 days  Any falls since hospitalization?  Restart Elliquis once platelet improves and with lower risk of falls.    Hospital Course:   Gregg Partida is a 63 y.o. male patient with a history of A-fib on Eliquis, CKD, alcohol dependence, COPD, CAD, HTN, and HLD that presents with alcohol intoxication and after sustaining a mechanical fall a day prior to admission. He was monitored on CIWA protocol. Patient became diffusely tremulous, tachycardic, agitated, hallucinating, and disoriented with a CIWA of 37. He was transferred to the ICU and received phenobarbital and Precedex with gradual improvement of the withdrawal symptoms. Patient was also found to be in A fib with RVR and was managed on Cardizem drip. He converted back to normal sinus rhythm. But Eliquis was held in the setting of thrombocytopenia and risk of fall. His strength improved on PT evaluation but still requiring assistant. However, patient lives by himself. Patient states he is feeling better now and need to go home. He left against medical advice.      * Alcohol abuse with withdrawal delirium (HCC)  Assessment & Plan  Patient presented to ER with generalized weakness secondary to alcohol use. He has been drinking a quart of vodka every day for the past 2 weeks, most recent drink was day prior to admission. Quit drinking for approximately 1 month but relapsed again around [place] time when he became very depressed about his son's death which occurred in 2005.  POA with CIWA of 21, EtOH level 313 had tremors, palpitations and sweating; no signs of AMS  Admitted to the floor and started on CIWA. Over the following days patient received ativan per protocol, but despite treatment he became diffusely tremulous,  tachycardic, agitated, hallucinating, and disoriented with a CIWA of 37. He was then transferred to the ICU  In ICU he received phenobarbital and precedex with gradual improvement of his symptoms. He then was downgraded to the medical floor.    Plan  Monitored on CIWA protocol  Seizure precautions, aspiration precautions  Thiamine, folate and multivitamins   Electrolyte repletion as needed  Neuro checks q4h  Crisis and psych consult - patient left AMA    CAD (coronary artery disease)  Assessment & Plan  Patient with hx of CAD s/p CABG on Eliquis 5 mg BID at home.   Home meds: Aspirin 81 mg, Eliquis 5 mg BID, Pravastatin 40 mg, Ranolazine 500mg    Plan  Eliquis on hold 2/2 low PLTs and risk of falls  Continue with aspirin, metoprolol, statin, ranolzine    COPD (chronic obstructive pulmonary disease) (HCC)  Assessment & Plan  Patient has some shortness of breath and cough at baseline. He is a chronic smoker, 1.5 PPD. There are no PFTs or pulm notes on file. Does not appear to be in acute exacerbation.    Plan  Continue home inhalers Breo Ellipta  PRN duonebs    Type 2 diabetes mellitus (HCC)  Assessment & Plan  Lab Results   Component Value Date    HGBA1C 5.4 01/24/2024     Recent Labs     01/26/24  1614 01/26/24  2050 01/27/24  0643 01/27/24  1129   POCGLU 100 77 101 134     Chronic, controlled. Home med includes Metformin 500 mg daily    Plan  Continue metformin  Carb controlled diet  Continue with gabapentin 300 mg TID daily for diabetic neuropathy     Essential hypertension  Assessment & Plan  Chronic, stable. BP on admission 134/76 mm Hg. Home meds: Lisinopril 10 mg QD & Lopressor 25 mg BID.    Plan  Continue home meds    Bacterial conjunctivitis of both eyes  Assessment & Plan  Patient with yellow purulent drainage from B/L eyes with crusting   Continue cipro ophthalmic drops TID x 7 days    Troponin level elevated  Assessment & Plan  Trop 137-->110-->105  Non MI troponin elevation in setting of Afib RVR    Inferior ST depressions on presenting EKG     Hypothyroidism  Assessment & Plan  Not on medication at home  Labs this admission consistent with subclinical hypothyroidism  TSH 0.42/T4 0.84    GERD (gastroesophageal reflux disease)  Assessment & Plan  Chronic, stable  Continue home protonix         Paroxysmal atrial fibrillation (HCC)  Assessment & Plan  Pt presented to ER with weakness, tachycardia and palpitations secondary to alcohol abuse. Pt has not taken home Lopressor and Eliquis since one week. EKG on admission showed Afib with RVR; likely due to medication on compliance. Started on cardizem drip in the ER.   Most likely secondary to alcohol withdrawal  HR much improved after phenobarbital and cardizem drip was DC  [id number] stroke risk score 3 points     Plan  Control symptoms of alcohol withdrawal as above which is likely  of uncontrolled Afib Cardiology consult  Continue with Lopressor 25 mg BID  Eliquis on hold due to thrombocytopenia  Monitored on telemetry    Fatty liver, alcoholic  Assessment & Plan  Hepatic steatosis on CT A/P 11/30  Mild elevation in LFTs, repeat CMP in 3 days  Encourage abstinence from alcohol     Chronic heart failure with preserved ejection fraction (HCC)  Assessment & Plan  Wt Readings from Last 3 Encounters:   01/27/24 88.5 kg (195 lb 1.7 oz)   12/14/23 96.2 kg (212 lb)   12/14/23 96.6 kg (213 lb)     Does not appear to be in acute exacerbation  Echo 9/2023: EF 62%, G2DD   from prior 95  (9/2023)  Continue home metoprolol and lisinopril  Strict I&Os  Daily weights    Hyponatremia  Assessment & Plan  POA Na 135, BL in high 120s to low 130s since 2021. Pt has chronic hyponatremia in setting of chronic alcohol use, poor oral intake and possible SIADH. He follows with Dr. Dugan, nephro outpatient.  Etiology likely alcohol abuse/low solute intake  Continue outpatient regimen of salt tablets 2g TID    Thrombocytopenia (HCC)  Assessment & Plan  Acute on chronic, plts  "on admission 50. Baseline in the low 100s. Secondary to alcohol abuse and long-term use of eliquis.     Plan  PLT count now uprending  Repeat CBC in 3 days  Continue to hold eliquis    Nicotine abuse  Assessment & Plan  Chronic, continues to smoke a pack daily    Plan  Continue Nicotine patch  Continue home chantix  smoking cessation education    Dyslipidemia  Assessment & Plan  Continue statin     Depression, recurrent (HCC)  Assessment & Plan  No home medications, self medicates with alcohol  Psych consult - pt left AMA prior to being seen    Hypomagnesemia  Assessment & Plan  Continue with Mag-ox 2 times a day    Acute kidney injury superimposed on CKD -resolved as of 1/27/2024  Assessment & Plan  Lab Results   Component Value Date    EGFR 83 01/27/2024    EGFR 96 01/26/2024    EGFR 95 01/25/2024    CREATININE 0.97 01/27/2024    CREATININE 0.79 01/26/2024    CREATININE 0.82 01/25/2024     Cr on admission 1.60. Baseline creatinine around 1 to 1.3. Prerenal secondary to dehydration and alcohol abuse. Patient has had previous admissions with ENMA secondary to alcohol abuse.     Plan  Avoid nephrotoxic medications as able/ dose medications per GFR  Holding Lisinopril for now  I/Os  Daily weights  Monitor renal function          Condition at Discharge: poor      Discharge Day Visit / Exam:      Vitals: Blood Pressure: 110/66 (01/27/24 1200)  Pulse: 89 (01/27/24 1200)  Temperature: (!) 97.3 °F (36.3 °C) (01/27/24 1130)  Temp Source: Temporal (01/27/24 1130)  Respirations: (!) 23 (01/27/24 1200)  Height: 6' 2\" (188 cm) (01/24/24 0910)  Weight - Scale: 88.5 kg (195 lb 1.7 oz) (01/27/24 0501)  SpO2: 93 % (01/27/24 1200)    Exam:   Physical Exam  Vitals reviewed.   Constitutional:       General: He is not in acute distress.     Appearance: Normal appearance. He is not ill-appearing.   Cardiovascular:      Rate and Rhythm: Normal rate and regular rhythm.      Pulses: Normal pulses.      Heart sounds: Normal heart sounds. No " murmur heard.  Pulmonary:      Effort: Pulmonary effort is normal. No respiratory distress.      Breath sounds: Normal breath sounds. No stridor. No wheezing, rhonchi or rales.   Chest:      Chest wall: No tenderness.   Abdominal:      General: Bowel sounds are normal.      Palpations: Abdomen is soft.      Tenderness: There is no abdominal tenderness.   Musculoskeletal:      Right lower leg: No edema.      Left lower leg: No edema.   Neurological:      General: No focal deficit present.      Mental Status: He is alert and oriented to person, place, and time.      Comments: + Tremors, improved          Discharge instructions/Information to patient and family:   See after visit summary for information provided to patient and family.       Discharge Medications:     Medication List      START taking these medications     senna-docusate sodium 8.6-50 mg per tablet; Commonly known as: SENOKOT   S; Take 1 tablet by mouth daily as needed for constipation     CHANGE how you take these medications     apixaban 5 mg; Commonly known as: ELIQUIS; Take 1 tablet (5 mg total) by   mouth 2 (two) times a day Do not start before January 31, 2024.; Start   taking on: January 31, 2024; What changed: These instructions start on   January 31, 2024. If you are unsure what to do until then, ask your doctor   or other care provider.     CONTINUE taking these medications     Adult Blood Pressure Cuff Lg Kit; Use in the morning   albuterol 90 mcg/act inhaler; Commonly known as: Ventolin HFA; Inhale 2   puffs every 4 (four) hours as needed for wheezing   aspirin 81 mg EC tablet; Commonly known as: ECOTRIN LOW STRENGTH   Breo Ellipta 200-25 MCG/ACT inhaler; Generic drug:   fluticasone-vilanterol; Inhale 1 puff daily Rinse mouth after use.   ferrous sulfate 325 (65 Fe) mg tablet; Take 1 tablet (325 mg total) by   mouth daily with breakfast   folic acid 1 mg tablet; Commonly known as: FOLVITE; TAKE 1 TABLET DAILY   gabapentin 300 mg capsule;  Commonly known as: NEURONTIN; TAKE ONE   CAPSULE THREE TIMES DAILY   lisinopril 20 mg tablet; Commonly known as: ZESTRIL; Take 0.5 tablets   (10 mg total) by mouth daily   magnesium Oxide 400 mg Tabs; Commonly known as: MAG-OX; TAKE 1 TABLET   TWO TIMES A DAY   metFORMIN 500 mg tablet; Commonly known as: GLUCOPHAGE; Take 1 tablet   (500 mg total) by mouth daily with breakfast Do not start before September 23, 2023.   metoprolol tartrate 25 mg tablet; Commonly known as: LOPRESSOR; Take 1   tablet (25 mg total) by mouth every 12 (twelve) hours   naltrexone 50 mg tablet; Commonly known as: REVIA; Take 1 tablet (50 mg   total) by mouth daily   nicotine 21 mg/24 hr TD 24 hr patch; Commonly known as: NICODERM CQ;   Place 1 patch on the skin over 24 hours daily Do not start before December 4, 2023.   ONE TOUCH ULTRA MINI w/Device Kit   OneTouch Delica Lancets Fine Misc   pantoprazole 40 mg tablet; Commonly known as: PROTONIX; Take 1 tablet   (40 mg total) by mouth 2 (two) times a day   pravastatin 40 mg tablet; Commonly known as: PRAVACHOL; TAKE 1 TABLET   DAILY WITH DINNER   ranolazine 500 mg 12 hr tablet; Commonly known as: RANEXA; TAKE 1 TABLET   EVERY 12 HOURS   sodium chloride 1 g tablet; Take 2 tablets (2 g total) by mouth 3   (three) times a day   tamsulosin 0.4 mg; Commonly known as: FLOMAX; TAKE 1 CAPSULE DAILY   varenicline 1 mg tablet; Commonly known as: CHANTIX; TAKE 1 TABLET 2   TIMES A DAY   vitamin B-1 100 MG Tabs; TAKE 1 TABLET DAILY     STOP taking these medications     polyethylene glycol 4000 mL solution; Commonly known as: COLYTE        Disposition:   Other: patient left AMA        Discharge Statement:  I spent 45 minutes discharging the patient. This time was spent on the day of discharge. I had direct contact with the patient on the day of discharge. Greater than 50% of the total time was spent examining patient, answering all patient questions, arranging and discussing plan of care with patient  as well as directly providing post-discharge instructions.  Additional time then spent on discharge activities.     ** Please Note: This note has been constructed using a voice recognition system **     Amilcar Garcia MD   01/27/24  3:53 PM

## 2024-01-27 NOTE — DISCHARGE INSTR - AVS FIRST PAGE
Please go for blood work on 1/30/24 and follow up with [facility].   You can restart taking Eliquis on 1/31/24 after being evaluated by your doctor at [facility].

## 2024-01-27 NOTE — ASSESSMENT & PLAN NOTE
Hepatic steatosis on CT A/P 11/30  Mild elevation in LFTs, repeat CMP in 3 days  Encourage abstinence from alcohol

## 2024-01-27 NOTE — ASSESSMENT & PLAN NOTE
Wt Readings from Last 3 Encounters:   01/27/24 88.5 kg (195 lb 1.7 oz)   12/14/23 96.2 kg (212 lb)   12/14/23 96.6 kg (213 lb)     Does not appear to be in acute exacerbation  Echo 9/2023: EF 62%, G2DD   from prior 95  (9/2023)  Continue home metoprolol and lisinopril  Strict I&Os  Daily weights

## 2024-01-27 NOTE — PROGRESS NOTES
Daily Progress Note - [facility]  Family Medicine Residency  Gregg Partida 63 y.o. male MRN: [id number]  Unit/Bed#: ICU 10 Encounter: [id number]  Admitting Physician: Judi Barraza MD  PCP: Korin Lo MD  Date of Admission:  1/22/2024  4:31 PM    Summary:     IVFs:    Diet: Diet Vlad/CHO Controlled; Consistent Carbohydrate Diet Level 2 (5 carb servings/75 grams CHO/meal)  VTE:   Eliquis on hold due to thrombocytopenia  . Mechanical prophylaxis in place.   Patient Centered Rounds: I have performed bedside rounds with nursing staff today.  Specialists/Other Care Team Provider: Cardiology    LOS: 5 day(s)  Status: Inpatient   Disposition: The patient will continue to require additional inpatient hospital stay due to alcohol withdrawal.  Code Status: Level 1 - Full Code      Assessment and Plan    * Alcohol abuse with withdrawal delirium (HCC)  Assessment & Plan  Patient presented to ER with generalized weakness secondary to alcohol use. He has been drinking a quart of vodka every day for the past 2 weeks, most recent drink was day prior to admission. Quit drinking for approximately 1 month but relapsed again around [place] time when he became very depressed about his son's death which occurred in 2005.  POA with CIWA of 21, EtOH level 313 had tremors, palpitations and sweating; no signs of AMS  Admitted to the floor and started on CIWA. Over the following days patient received ativan per protocol, but despite treatment he became diffusely tremulous, tachycardic, agitated, hallucinating, and disoriented with a CIWA of 37. He was then transferred to the ICU  In ICU he received phenobarbital and precedex with gradual improvement of his symptoms. He then was downgraded to the medical floor.    Plan  Continue CIWA protocol  Seizure precautions, aspiration precautions  Thiamine, folate and multivitamins   Electrolyte repletion as needed  Neuro checks q4h  Crisis and psych consult      Bacterial conjunctivitis of both eyes  Assessment & Plan  Patient with yellow purulent drainage from B/L eyes with crusting   Continue cipro ophthalmic drops TID x 7 days    Troponin level elevated  Assessment & Plan  Trop 137-->110-->105  Non MI troponin elevation in setting of Afib RVR   Inferior ST depressions on presenting EKG     Hypothyroidism  Assessment & Plan  Not on medication at home  Labs this admission consistent with subclinical hypothyroidism  TSH 0.42/T4 0.84    GERD (gastroesophageal reflux disease)  Assessment & Plan  Chronic, stable  Continue home protonix         Atrial fibrillation with rapid ventricular response (HCC)  Assessment & Plan  Pt presented to ER with weakness, tachycardia and palpitations secondary to alcohol abuse. Pt has not taken home Lopressor and Eliquis since one week. EKG on admission showed Afib with RVR; likely due to medication on compliance. Started on cardizem drip in the ER.   Most likely secondary to alcohol withdrawal  HR much improved after phenobarbital and cardizem drip was DC  [id number] stroke risk score 3 points     Plan  Control symptoms of alcohol withdrawal as above which is likely  of uncontrolled Afib Cardiology consult  Continue with Lopressor 25 mg BID  Eliquis on hold due to thrombocytopenia  Monitor telemetry    Fatty liver, alcoholic  Assessment & Plan  Hepatic steatosis on CT A/P 11/30  Mild elevation in LFTs, continue to trend hepatic function panel  Encourage abstinence from alcohol     Chronic heart failure with preserved ejection fraction (HCC)  Assessment & Plan  Wt Readings from Last 3 Encounters:   01/24/24 88.9 kg (195 lb 15.8 oz)   12/14/23 96.2 kg (212 lb)   12/14/23 96.6 kg (213 lb)     Does not appear to be in acute exacerbation  Echo 9/2023: EF 62%, G2DD   from prior 95  (9/2023)  Continue home metoprolol, holding gome lisinopril in setting of ENMA  Strict I&Os  Daily weights    Hyponatremia  Assessment & Plan  POA Na  135, BL in high 120s to low 130s since 2021. Pt has chronic hyponatremia in setting of chronic alcohol use, poor oral intake and possible SIADH. He follows with Dr. Dugan, nephro outpatient.  Etiology likely alcohol abuse/low solute intake  Continue outpatient regimen of salt tablets 2g TID    Thrombocytopenia (HCC)  Assessment & Plan  Acute on chronic, plts on admission 50. Baseline in the low 100s. Secondary to alcohol abuse and long-term use of eliquis.     Plan  PLT count now uprending  Trend CBC daily; continue to monitor closely  Continue to hold eliquis  Transfuse for platelet count less than 20  Monitor for signs/symptoms of bleeding    Nicotine abuse  Assessment & Plan  Chronic, continues to smoke a pack daily    Plan  Continue Nicotine patch  Continue home chantix  smoking cessation education    Acute kidney injury superimposed on CKD   Assessment & Plan  Lab Results   Component Value Date    EGFR 45 01/22/2024    EGFR 90 12/11/2023    EGFR 64 12/06/2023    CREATININE 1.60 (H) 01/22/2024    CREATININE 0.91 12/11/2023    CREATININE 1.20 12/06/2023     Cr on admission 1.60. Baseline creatinine around 1 to 1.3. Prerenal secondary to dehydration and alcohol abuse. Patient has had previous admissions with ENMA secondary to alcohol abuse.     Plan  Avoid nephrotoxic medications as able/ dose medications per GFR  Holding Lisinopril for now  I/Os  Daily weights  Monitor renal function    CAD (coronary artery disease)  Assessment & Plan  Patient with hx of CAD s/p CABG on Eliquis 5 mg BID at home.   Home meds: Aspirin 81 mg, Eliquis 5 mg BID, Pravastatin 40 mg, Ranolazine 500mg    Plan  Eliquis on hold 2/2 low PLTs  Continue with aspirin, metoprolol, statin, ranolzine      Dyslipidemia  Assessment & Plan  Continue statin     Depression, recurrent (HCC)  Assessment & Plan  No home medications, self medicates with alcohol  Psych consult    Hypomagnesemia  Assessment & Plan  Mg 1.4 on admission. Pt has chronic  "hypomagnesemia secondary to poor intake and chronic alcohol use and is on Mag-ox 400 mg BID. He has token his home meds since one week.     Plan  Continue with Mag-ox 2 times a day  Replete as needed  Trend daily    Essential hypertension  Assessment & Plan  Chronic, stable. BP on admission 134/76 mm Hg. Home meds: Lisinopril 10 mg QD & Lopressor 25 mg BID.    Plan  Holding Lisinopril for now due to ENMA  Continue with Lopressor 25 mg BID  Monitor BP and V/S    Type 2 diabetes mellitus (HCC)  Assessment & Plan  Lab Results   Component Value Date    HGBA1C 5.4 2024     Recent Labs     24  0557 24  1132 24  1614 24   POCGLU 111 112 100 77     Chronic, controlled. Home med includes Metformin 500 mg daily    Plan  Holding home metformin  ISS and Accu-checks ACHS  Carb controlled diet  Continue with gabapentin 300 mg TID daily for diabetic neuropathy     COPD (chronic obstructive pulmonary disease) ([facility])  Assessment & Plan  Patient has some shortness of breath and cough at baseline. He is a chronic smoker, 1.5 PPD. There are no PFTs or pulm notes on file. Does not appear to be in acute exacerbation.    Plan  Continue home inhalers Breo Ellipta  PRN duonebs  Monitor oxygen saturation          Subjective:   Patient seen and evaluated at bedside, he was calm and in no acute distress.  He states that he is feeling better than when he came into the hospital.  He still has tremors but much improved.  He denies any fever, chills, chest pain, palpitations, N/V/D    Patient states that he wants to leave the hospital today because he \"needs to take care of some things \"back home before going to rehab.  He states that he is working on getting his 's license back and would need to go to an alcohol and substance abuse rehab through the [facility].    Objective     Objective:   Vitals:   Temp (24hrs), Av.9 °F (36.1 °C), Min:96.3 °F (35.7 °C), Max:97.4 °F (36.3 °C)    Temp:  [96.3 °F (35.7 " °C)-97.4 °F (36.3 °C)] 96.3 °F (35.7 °C)  HR:  [] 89  Resp:  [21-48] 22  BP: (106-166)/() 144/79  SpO2:  [91 %-99 %] 92 %  Body mass index is 25.05 kg/m².     Input and Output Summary (last 24 hours):       Intake/Output Summary (Last 24 hours) at 1/27/2024 0750  Last data filed at 1/27/2024 0501  Gross per 24 hour   Intake 240 ml   Output 1525 ml   Net -1285 ml       Physical Exam:   Physical Exam  Vitals reviewed.   Constitutional:       General: He is not in acute distress.     Appearance: Normal appearance.   Cardiovascular:      Rate and Rhythm: Normal rate and regular rhythm.      Heart sounds: Normal heart sounds. No murmur heard.  Pulmonary:      Effort: Pulmonary effort is normal. No respiratory distress.      Breath sounds: Rhonchi (Scattered b/l) present.   Abdominal:      Palpations: Abdomen is soft.      Tenderness: There is no abdominal tenderness.   Musculoskeletal:      Right lower leg: No edema.      Left lower leg: No edema.   Neurological:      Mental Status: He is alert and oriented to person, place, and time.      Comments: + Tremors, improved           Additional Data:     Labs:  Results from last 7 days   Lab Units 01/27/24  0538 01/26/24  0000 01/25/24  0629   WBC Thousand/uL 6.69   < > 5.20   HEMOGLOBIN g/dL 13.1   < > 11.9*   HEMATOCRIT % 37.3   < > 34.5*   PLATELETS Thousands/uL 54*   < > 24*   NEUTROS PCT %  --   --  61   LYMPHS PCT %  --   --  21   MONOS PCT %  --   --  12   EOS PCT %  --   --  4    < > = values in this interval not displayed.     Results from last 7 days   Lab Units 01/27/24  0538 01/26/24  0557 01/25/24  0629   POTASSIUM mmol/L 4.0   < > 3.8   CHLORIDE mmol/L 98   < > 100   CO2 mmol/L 26   < > 27   BUN mg/dL 27*   < > 17   CREATININE mg/dL 0.97   < > 0.82   CALCIUM mg/dL 8.4   < > 8.5   ALK PHOS U/L  --   --  65   ALT U/L  --   --  61*   AST U/L  --   --  78*    < > = values in this interval not displayed.     Results from last 7 days   Lab Units  01/25/24  0629   INR  1.03     Results from last 7 days   Lab Units 01/27/24  0643 01/26/24  2050 01/26/24  1614 01/26/24  1132 01/26/24  0557   POC GLUCOSE mg/dl 101 77 100 112 111     Results from last 7 days   Lab Units 01/24/24  0756   HEMOGLOBIN A1C % 5.4       * I Have Reviewed All Lab Data Listed Above.  * Additional Pertinent Lab Tests Reviewed: All Labs Within Last 24 Hours Reviewed    Imaging:    Imaging Reports Reviewed Today Include: None  Imaging Personally Reviewed by Myself Includes:  None    Recent Cultures (last 7 days):           Last 24 Hours Medication List:   Current Facility-Administered Medications   Medication Dose Route Frequency Provider Last Rate    aspirin  81 mg Oral Daily [facility]      calcium carbonate  1,000 mg Oral Daily PRN [facility]      ciprofloxacin  1 drop Both Eyes Q4H While Awake [facility] Angella, CRNP      ferrous sulfate  325 mg Oral Daily With Breakfast [facility] Angella, CRNP      fluticasone-vilanterol  1 puff Inhalation Daily [facility] Angella, CRNP      folic acid  1 mg Oral Daily [facility], [facility]      gabapentin  300 mg Oral TID [facility], CR[facility]      insulin lispro  1-6 Units Subcutaneous TID AC [facility] Angella, CRNP      insulin lispro  1-6 Units Subcutaneous HS [facility], CR[facility]      ipratropium-albuterol  3 mL Nebulization Q6H PRN [facility] Angella, CRNP      lisinopril  10 mg Oral Daily [facility] Angella, CRNP      magnesium Oxide  400 mg Oral BID [facility], CR[facility]      melatonin  6 mg Oral HS [facility], CRNP      metoprolol tartrate  25 mg Oral Q12H [facility] Angella, CRNP      multivitamin-minerals  1 tablet Oral Daily [facility], CRNP      nicotine  1 patch Transdermal Daily [facility], CRNP      ondansetron  4 mg Intravenous Q6H PRN [facility]  Angella, CRNP      pantoprazole  40 mg Oral BID [facility], CRNP      pravastatin  40 mg Oral Daily With [facility], CR[facility]      ranolazine  500 mg Oral Q12H [facility], CRNP      senna-docusate sodium  1 tablet Oral BID [facility], CRNP      sodium chloride  2 g Oral TID [facility], CRNP      tamsulosin  0.4 mg Oral Daily [facility], CRNP      thiamine  100 mg Oral Daily [facility], CRNP      varenicline  1 mg Oral BID [facility], CRNP            Patient Information Sharing: With the consent of Gregg Partida , their loved ones were notified today by inpatient team of the patient’s condition and current plan.  All questions answered.     Time Spent for Care: 45 minutes.  More than 50% of total time spent on counseling and coordination of care as described above.    ** Please Note: Dictation voice to text software may have been used in the creation of this document. **    Judi Barraza MD  01/27/24  7:59 AM

## 2024-01-27 NOTE — PLAN OF CARE
Pt requesting to leave AMA. Dr Deng Nascimento MD at bedside, discussed AMA with patient. Pt still requesting to leave. Forms signed with Mds present, IV's removed, belongings provided to patient. Pt transported to ride home via wheelchair for safety.

## 2024-01-27 NOTE — PROGRESS NOTES
Critical Care Interval Transfer Note:    Please refer to progress note from earlier today for full details.     Barriers to discharge:   Monitoring of mentation, improving s/p treatment for alcohol withdrawal. Now off of precedex.   Monitor platelet count, will need discussion regarding risks vs benefits of resuming AC for risk reduction secondary to Afib in the setting of alcohol use, thrombocytopenia   Electrolyte monitoring     Consults: IP CONSULT TO CARDIOLOGY  IP CONSULT TO ED CRISIS WORKER  IP CONSULT TO PSYCHIATRY  IP CONSULT TO MEDICAL CRITICAL CARE      Discharge Plan: Anticipate discharge in 24-48 hrs to home.      OT Recommendations: Post acute rehabilitation services    Patient seen and evaluated by Critical Care today and deemed to be appropriate for transfer to Med Surg with Telemetry. Spoke to Dr. Eric Barraza from [facility] to accept transfer. Critical care can be contacted via [employer] with any questions or concerns.

## 2024-01-27 NOTE — ASSESSMENT & PLAN NOTE
Not on medication at home  Labs this admission consistent with subclinical hypothyroidism  TSH 0.42/T4 0.84

## 2024-01-29 ENCOUNTER — PATIENT OUTREACH (OUTPATIENT)
Dept: FAMILY MEDICINE CLINIC | Facility: CLINIC | Age: 64
End: 2024-01-29

## 2024-01-29 ENCOUNTER — TELEPHONE (OUTPATIENT)
Dept: FAMILY MEDICINE CLINIC | Facility: CLINIC | Age: 64
End: 2024-01-29

## 2024-01-29 DIAGNOSIS — Z71.89 COMPLEX CARE COORDINATION: Primary | ICD-10-CM

## 2024-01-29 NOTE — PROGRESS NOTES
Referral received. Pt is known to this RN CM. Chart review done.     Outreach attempted. Unable to leave voice message. Number not in service.     Will attempt later in the week.

## 2024-01-29 NOTE — TELEPHONE ENCOUNTER
----- Message from Doron Reyes MD sent at 1/27/2024  2:23 PM EST -----  [facility]  This patient left AMA today from the hospital and needs to be seen for reviewing blood work on Wednesday 01/31/24. Could you please get in touch with him for this? Also, his phone service may not be working, but he did agree to come to [facility] on Monday. Please help him get in accordingly for a TCM visit    Thanks,    Doron Reyes MD

## 2024-01-30 ENCOUNTER — APPOINTMENT (OUTPATIENT)
Dept: LAB | Facility: HOSPITAL | Age: 64
End: 2024-01-30
Payer: COMMERCIAL

## 2024-01-30 DIAGNOSIS — E83.42 HYPOMAGNESEMIA: ICD-10-CM

## 2024-01-30 DIAGNOSIS — F10.10 ALCOHOL ABUSE: Chronic | ICD-10-CM

## 2024-01-30 DIAGNOSIS — D69.6 THROMBOCYTOPENIA (HCC): Chronic | ICD-10-CM

## 2024-01-30 DIAGNOSIS — E87.1 HYPONATREMIA: ICD-10-CM

## 2024-01-30 DIAGNOSIS — N18.31 STAGE 3A CHRONIC KIDNEY DISEASE (HCC): ICD-10-CM

## 2024-01-30 LAB
ALBUMIN SERPL BCP-MCNC: 3.4 G/DL (ref 3.5–5)
ALP SERPL-CCNC: 59 U/L (ref 34–104)
ALT SERPL W P-5'-P-CCNC: 32 U/L (ref 7–52)
ANION GAP SERPL CALCULATED.3IONS-SCNC: 8 MMOL/L
AST SERPL W P-5'-P-CCNC: 23 U/L (ref 13–39)
BASOPHILS # BLD AUTO: 0.05 THOUSANDS/ÂΜL (ref 0–0.1)
BASOPHILS NFR BLD AUTO: 1 % (ref 0–1)
BILIRUB SERPL-MCNC: 0.46 MG/DL (ref 0.2–1)
BUN SERPL-MCNC: 15 MG/DL (ref 5–25)
CALCIUM ALBUM COR SERPL-MCNC: 8.9 MG/DL (ref 8.3–10.1)
CALCIUM SERPL-MCNC: 8.4 MG/DL (ref 8.4–10.2)
CHLORIDE SERPL-SCNC: 98 MMOL/L (ref 96–108)
CO2 SERPL-SCNC: 25 MMOL/L (ref 21–32)
CREAT SERPL-MCNC: 1.11 MG/DL (ref 0.6–1.3)
EOSINOPHIL # BLD AUTO: 0.13 THOUSAND/ÂΜL (ref 0–0.61)
EOSINOPHIL NFR BLD AUTO: 2 % (ref 0–6)
ERYTHROCYTE [DISTWIDTH] IN BLOOD BY AUTOMATED COUNT: 14.4 % (ref 11.6–15.1)
FOLATE SERPL-MCNC: >22.3 NG/ML
GFR SERPL CREATININE-BSD FRML MDRD: 71 ML/MIN/1.73SQ M
GLUCOSE SERPL-MCNC: 129 MG/DL (ref 65–140)
HCT VFR BLD AUTO: 34 % (ref 36.5–49.3)
HGB BLD-MCNC: 11.4 G/DL (ref 12–17)
IMM GRANULOCYTES # BLD AUTO: 0.02 THOUSAND/UL (ref 0–0.2)
IMM GRANULOCYTES NFR BLD AUTO: 0 % (ref 0–2)
LYMPHOCYTES # BLD AUTO: 1.04 THOUSANDS/ÂΜL (ref 0.6–4.47)
LYMPHOCYTES NFR BLD AUTO: 19 % (ref 14–44)
MAGNESIUM SERPL-MCNC: 1.3 MG/DL (ref 1.9–2.7)
MCH RBC QN AUTO: 33.4 PG (ref 26.8–34.3)
MCHC RBC AUTO-ENTMCNC: 33.5 G/DL (ref 31.4–37.4)
MCV RBC AUTO: 100 FL (ref 82–98)
MONOCYTES # BLD AUTO: 1.58 THOUSAND/ÂΜL (ref 0.17–1.22)
MONOCYTES NFR BLD AUTO: 29 % (ref 4–12)
NEUTROPHILS # BLD AUTO: 2.61 THOUSANDS/ÂΜL (ref 1.85–7.62)
NEUTS SEG NFR BLD AUTO: 49 % (ref 43–75)
NRBC BLD AUTO-RTO: 0 /100 WBCS
PLATELET # BLD AUTO: 170 THOUSANDS/UL (ref 149–390)
PMV BLD AUTO: 11.2 FL (ref 8.9–12.7)
POTASSIUM SERPL-SCNC: 3.9 MMOL/L (ref 3.5–5.3)
PROT SERPL-MCNC: 6.3 G/DL (ref 6.4–8.4)
RBC # BLD AUTO: 3.41 MILLION/UL (ref 3.88–5.62)
SODIUM SERPL-SCNC: 131 MMOL/L (ref 135–147)
VIT B12 SERPL-MCNC: 968 PG/ML (ref 180–914)
WBC # BLD AUTO: 5.43 THOUSAND/UL (ref 4.31–10.16)

## 2024-01-30 PROCEDURE — 82746 ASSAY OF FOLIC ACID SERUM: CPT

## 2024-01-30 PROCEDURE — 36415 COLL VENOUS BLD VENIPUNCTURE: CPT

## 2024-01-30 PROCEDURE — 85025 COMPLETE CBC W/AUTO DIFF WBC: CPT

## 2024-01-30 PROCEDURE — 83735 ASSAY OF MAGNESIUM: CPT

## 2024-01-30 PROCEDURE — 82607 VITAMIN B-12: CPT

## 2024-01-30 PROCEDURE — 80053 COMPREHEN METABOLIC PANEL: CPT

## 2024-01-30 NOTE — UTILIZATION REVIEW
NOTIFICATION OF ADMISSION DISCHARGE   This is a Notification of Discharge from [facility]. Please be advised that this patient has been discharge from our facility. Below you will find the admission and discharge date and time including the patient’s disposition.   UTILIZATION REVIEW CONTACT:  Lashon James  Utilization   Network Utilization Review Department  Phone: [phone] x carefully listen to the prompts. All voicemails are confidential.  Email: [email or web address]     ADMISSION INFORMATION  PRESENTATION DATE: 1/22/2024  4:31 PM  OBERVATION ADMISSION DATE:   INPATIENT ADMISSION DATE: 1/22/24  6:26 PM   DISCHARGE DATE: 1/27/2024  1:00 PM   DISPOSITION:Left against medical advice or discontinued care    Network Utilization Review Department  ATTENTION: Please call with any questions or concerns to [phone] and carefully listen to the prompts so that you are directed to the right person. All voicemails are confidential.   For Discharge needs, contact Care Management DC Support Team at [phone] opt. 2  Send all requests for admission clinical reviews, approved or denied determinations and any other requests to dedicated fax number below belonging to the campus where the patient is receiving treatment. List of dedicated fax numbers for the Facilities:  FACILITY NAME UR FAX NUMBER   ADMISSION DENIALS (Administrative/Medical Necessity) [phone]   DISCHARGE SUPPORT TEAM ([facility]) [phone]   PARENT CHILD HEALTH (Maternity/NICU/Pediatrics) [phone]   [facility] [phone]   [facility] [phone]   formerly [facility] [phone]   [facility] [phone]   [facility] [phone]   [facility] [phone]   [facility] [phone]   [facility]  [facility] [phone]   [facility] [phone]   [facility] [phone]   [facility] [phone]   [facility] [phone]

## 2024-01-31 ENCOUNTER — TELEPHONE (OUTPATIENT)
Dept: FAMILY MEDICINE CLINIC | Facility: CLINIC | Age: 64
End: 2024-01-31

## 2024-01-31 ENCOUNTER — TELEPHONE (OUTPATIENT)
Dept: NEPHROLOGY | Facility: CLINIC | Age: 64
End: 2024-01-31

## 2024-01-31 DIAGNOSIS — F10.10 ALCOHOL ABUSE: Chronic | ICD-10-CM

## 2024-01-31 RX ORDER — LANOLIN ALCOHOL/MO/W.PET/CERES
400 CREAM (GRAM) TOPICAL 2 TIMES DAILY
Qty: 60 TABLET | Refills: 0 | Status: ON HOLD | OUTPATIENT
Start: 2024-01-31

## 2024-01-31 NOTE — TELEPHONE ENCOUNTER
Talked with pt today (he obtained a new phone)  When he had labs done he was taking salt tablets 3 gm. BID but has been out for about 3 days now.  He had no refill but he states they are making  him sick, to the point that he wants to vomit.  Also he says its impossible to adhere to fluid restriction as his mouth is always dry.            Called x2. Not able to leave message.  To call again.    ----- Message from Leonides Dugan MD sent at 1/30/2024  4:18 PM EST -----  Please inform patient that most recent labs (1/30/24) show that sodium level is worse compared to the hospital readings. Na is now down to 131. Please make sure he is taking salt tablets 2 gm three times a day (or 3 gm BID) and he is following a fluid restriction of 1200 cc/24 hours. Please have him repeat a BMP in 1 month.

## 2024-01-31 NOTE — TELEPHONE ENCOUNTER
Patient's phone is disconnected, spoke with son Gregg Rdz, he said that patient does not have a working number.  I told him to tell his dad to contact our office with a updated contact info.  Also notified son that patients magnesium level was low and that he should resume taking his magnesium suppliment.  Refill order placed.

## 2024-02-02 ENCOUNTER — TELEPHONE (OUTPATIENT)
Dept: FAMILY MEDICINE CLINIC | Facility: CLINIC | Age: 64
End: 2024-02-02

## 2024-02-02 ENCOUNTER — PATIENT OUTREACH (OUTPATIENT)
Dept: FAMILY MEDICINE CLINIC | Facility: CLINIC | Age: 64
End: 2024-02-02

## 2024-02-02 NOTE — TELEPHONE ENCOUNTER
Patient in office wanting to know if he should start taking his Eliquis again. He says that he is starting to feel pressure in his chest since he has not taken it.    Please call patient at [phone]

## 2024-02-02 NOTE — PROGRESS NOTES
Pt due for outreach. According to notes, pt does not have a working phone at this time.     Dr. Zimmerman reached out to patients son to advise him to continue to take magnesium supplements.     RN CM will attempt to reach patient in one week.

## 2024-02-02 NOTE — TELEPHONE ENCOUNTER
Please let him know that he can stay off the salt tablets. However, he needs to have some degree of fluid restriction. It was supposed to be 1200 cc. We can try to relax it to 1800 cc. Please have him drink Ensure 1 can twice a day - this will replace the salt tabs. Repeat BMP in 1 month.

## 2024-02-02 NOTE — TELEPHONE ENCOUNTER
"Pt advised that fluid restriction relaxed to 1800 cc per day per Dr. Dugan.  To try one can of \"Ensure BID to replace salt tablets.  Repeat BMP in one month.  Order mailed.    "

## 2024-02-05 ENCOUNTER — RA CDI HCC (OUTPATIENT)
Dept: OTHER | Facility: HOSPITAL | Age: 64
End: 2024-02-05

## 2024-02-05 DIAGNOSIS — E87.1 HYPONATREMIA: Primary | ICD-10-CM

## 2024-02-05 PROBLEM — E11.22 TYPE 2 DIABETES MELLITUS WITH DIABETIC CHRONIC KIDNEY DISEASE (HCC): Status: ACTIVE | Noted: 2018-10-09

## 2024-02-05 PROBLEM — I13.0 HYPERTENSIVE HEART AND CHRONIC KIDNEY DISEASE WITH HEART FAILURE AND STAGE 1 THROUGH STAGE 4 CHRONIC KIDNEY DISEASE, OR CHRONIC KIDNEY DISEASE (HCC): Status: ACTIVE | Noted: 2024-02-05

## 2024-02-05 PROBLEM — I13.0 HYPERTENSIVE HEART AND CHRONIC KIDNEY DISEASE WITH HEART FAILURE AND STAGE 1 THROUGH STAGE 4 CHRONIC KIDNEY DISEASE, OR CHRONIC KIDNEY DISEASE (HCC): Status: ACTIVE | Noted: 2018-10-09

## 2024-02-05 NOTE — PROGRESS NOTES
HCC coding opportunities          Chart Reviewed number of suggestions sent to Provider: 2     Patients Insurance   I13.0 and E11.22  Medicare Insurance: [employer] Medicare Complete

## 2024-02-06 ENCOUNTER — PATIENT OUTREACH (OUTPATIENT)
Dept: FAMILY MEDICINE CLINIC | Facility: CLINIC | Age: 64
End: 2024-02-06

## 2024-02-06 NOTE — PROGRESS NOTES
Pt scheduled for appointment today at 1:40. Attempted to call patient. Mailbox is not set up, therefore message cannot be left.     Will attempt one more outreach.

## 2024-02-09 ENCOUNTER — PATIENT OUTREACH (OUTPATIENT)
Dept: FAMILY MEDICINE CLINIC | Facility: CLINIC | Age: 64
End: 2024-02-09

## 2024-02-09 ENCOUNTER — TELEPHONE (OUTPATIENT)
Dept: FAMILY MEDICINE CLINIC | Facility: CLINIC | Age: 64
End: 2024-02-09

## 2024-02-09 NOTE — PROGRESS NOTES
Pt due for outreach. Outreach attempted. Mailbox not set up. Unable to leave VM.     Unable to reach letter sent. Complex episode closed. Will reopen if new referral is received.

## 2024-02-09 NOTE — LETTER
Date: 02/09/24    Dear Gregg Partida,   My name is Grant. I am a RN Case Manager at [facility]. I have been trying to call you to follow up from your most recent hospitalization.   Please call me at your earliest convenience   Sincerely,  Grant RN Case Manager  [phone]  Outpatient Care Manager

## 2024-02-09 NOTE — TELEPHONE ENCOUNTER
Pt last cbc showed improved platelets. Please call and if he has not been falling, he should restart his Eliquis.     Dr. Lo

## 2024-02-10 ENCOUNTER — APPOINTMENT (EMERGENCY)
Dept: RADIOLOGY | Facility: HOSPITAL | Age: 64
DRG: 309 | End: 2024-02-10
Payer: COMMERCIAL

## 2024-02-10 ENCOUNTER — HOSPITAL ENCOUNTER (INPATIENT)
Facility: HOSPITAL | Age: 64
LOS: 4 days | Discharge: HOME | DRG: 309 | End: 2024-02-14
Attending: EMERGENCY MEDICINE | Admitting: FAMILY MEDICINE
Payer: COMMERCIAL

## 2024-02-10 DIAGNOSIS — R07.89 ATYPICAL CHEST PAIN: Primary | ICD-10-CM

## 2024-02-10 DIAGNOSIS — R74.01 TRANSAMINITIS: ICD-10-CM

## 2024-02-10 DIAGNOSIS — N18.31 STAGE 3A CHRONIC KIDNEY DISEASE (HCC): ICD-10-CM

## 2024-02-10 DIAGNOSIS — E87.1 HYPONATREMIA: ICD-10-CM

## 2024-02-10 DIAGNOSIS — F10.929 ALCOHOL INTOXICATION (HCC): ICD-10-CM

## 2024-02-10 DIAGNOSIS — E83.42 HYPOMAGNESEMIA: ICD-10-CM

## 2024-02-10 DIAGNOSIS — F10.10 ALCOHOL CONSUMPTION BINGE DRINKING: ICD-10-CM

## 2024-02-10 DIAGNOSIS — K21.9 GASTROESOPHAGEAL REFLUX DISEASE, UNSPECIFIED WHETHER ESOPHAGITIS PRESENT: ICD-10-CM

## 2024-02-10 DIAGNOSIS — Z91.89 AT RISK FOR MEDICATION NONCOMPLIANCE: ICD-10-CM

## 2024-02-10 DIAGNOSIS — I21.4 NSTEMI (NON-ST ELEVATED MYOCARDIAL INFARCTION) (HCC): ICD-10-CM

## 2024-02-10 DIAGNOSIS — I48.0 PAROXYSMAL ATRIAL FIBRILLATION (HCC): ICD-10-CM

## 2024-02-10 DIAGNOSIS — I48.0 PAROXYSMAL ATRIAL FIBRILLATION WITH RAPID VENTRICULAR RESPONSE (HCC): ICD-10-CM

## 2024-02-10 DIAGNOSIS — D69.6 THROMBOCYTOPENIA (HCC): Chronic | ICD-10-CM

## 2024-02-10 PROBLEM — F10.139 ALCOHOL ABUSE WITH WITHDRAWAL (HCC): Status: ACTIVE | Noted: 2019-10-17

## 2024-02-10 PROBLEM — I21.A1 TYPE 2 MI (MYOCARDIAL INFARCTION) (HCC): Status: ACTIVE | Noted: 2024-02-10

## 2024-02-10 LAB
2HR DELTA HS TROPONIN: -12 NG/L
4HR DELTA HS TROPONIN: -13 NG/L
ALBUMIN SERPL BCP-MCNC: 4.1 G/DL (ref 3.5–5)
ALP SERPL-CCNC: 82 U/L (ref 34–104)
ALT SERPL W P-5'-P-CCNC: 67 U/L (ref 7–52)
ANION GAP SERPL CALCULATED.3IONS-SCNC: 16 MMOL/L
APTT PPP: 30 SECONDS (ref 23–37)
AST SERPL W P-5'-P-CCNC: 88 U/L (ref 13–39)
BASOPHILS # BLD AUTO: 0.26 THOUSANDS/ÂΜL (ref 0–0.1)
BASOPHILS NFR BLD AUTO: 4 % (ref 0–1)
BILIRUB SERPL-MCNC: 0.55 MG/DL (ref 0.2–1)
BUN SERPL-MCNC: 12 MG/DL (ref 5–25)
CALCIUM SERPL-MCNC: 8.4 MG/DL (ref 8.4–10.2)
CARDIAC TROPONIN I PNL SERPL HS: 69 NG/L
CARDIAC TROPONIN I PNL SERPL HS: 70 NG/L
CARDIAC TROPONIN I PNL SERPL HS: 82 NG/L
CHLORIDE SERPL-SCNC: 94 MMOL/L (ref 96–108)
CO2 SERPL-SCNC: 25 MMOL/L (ref 21–32)
CREAT SERPL-MCNC: 1.04 MG/DL (ref 0.6–1.3)
EOSINOPHIL # BLD AUTO: 0.16 THOUSAND/ÂΜL (ref 0–0.61)
EOSINOPHIL NFR BLD AUTO: 2 % (ref 0–6)
ERYTHROCYTE [DISTWIDTH] IN BLOOD BY AUTOMATED COUNT: 14.5 % (ref 11.6–15.1)
ETHANOL SERPL-MCNC: 412 MG/DL
GFR SERPL CREATININE-BSD FRML MDRD: 77 ML/MIN/1.73SQ M
GLUCOSE SERPL-MCNC: 105 MG/DL (ref 65–140)
GLUCOSE SERPL-MCNC: 117 MG/DL (ref 65–140)
GLUCOSE SERPL-MCNC: 119 MG/DL (ref 65–140)
GLUCOSE SERPL-MCNC: 79 MG/DL (ref 65–140)
HCT VFR BLD AUTO: 38 % (ref 36.5–49.3)
HGB BLD-MCNC: 13.5 G/DL (ref 12–17)
IMM GRANULOCYTES # BLD AUTO: 0.01 THOUSAND/UL (ref 0–0.2)
IMM GRANULOCYTES NFR BLD AUTO: 0 % (ref 0–2)
INR PPP: 0.94 (ref 0.84–1.19)
LIPASE SERPL-CCNC: 62 U/L (ref 11–82)
LYMPHOCYTES # BLD AUTO: 2.39 THOUSANDS/ÂΜL (ref 0.6–4.47)
LYMPHOCYTES NFR BLD AUTO: 34 % (ref 14–44)
MAGNESIUM SERPL-MCNC: 1.3 MG/DL (ref 1.9–2.7)
MCH RBC QN AUTO: 33.3 PG (ref 26.8–34.3)
MCHC RBC AUTO-ENTMCNC: 35.5 G/DL (ref 31.4–37.4)
MCV RBC AUTO: 94 FL (ref 82–98)
MONOCYTES # BLD AUTO: 0.55 THOUSAND/ÂΜL (ref 0.17–1.22)
MONOCYTES NFR BLD AUTO: 8 % (ref 4–12)
NEUTROPHILS # BLD AUTO: 3.7 THOUSANDS/ÂΜL (ref 1.85–7.62)
NEUTS SEG NFR BLD AUTO: 52 % (ref 43–75)
NRBC BLD AUTO-RTO: 0 /100 WBCS
PLATELET # BLD AUTO: 263 THOUSANDS/UL (ref 149–390)
PMV BLD AUTO: 9.6 FL (ref 8.9–12.7)
POTASSIUM SERPL-SCNC: 4.2 MMOL/L (ref 3.5–5.3)
PROT SERPL-MCNC: 7.5 G/DL (ref 6.4–8.4)
PROTHROMBIN TIME: 12.8 SECONDS (ref 11.6–14.5)
RBC # BLD AUTO: 4.05 MILLION/UL (ref 3.88–5.62)
SODIUM SERPL-SCNC: 135 MMOL/L (ref 135–147)
WBC # BLD AUTO: 7.07 THOUSAND/UL (ref 4.31–10.16)

## 2024-02-10 PROCEDURE — 72125 CT NECK SPINE W/O DYE: CPT

## 2024-02-10 PROCEDURE — 70450 CT HEAD/BRAIN W/O DYE: CPT

## 2024-02-10 PROCEDURE — 36415 COLL VENOUS BLD VENIPUNCTURE: CPT | Performed by: EMERGENCY MEDICINE

## 2024-02-10 PROCEDURE — 99223 1ST HOSP IP/OBS HIGH 75: CPT | Performed by: FAMILY MEDICINE

## 2024-02-10 PROCEDURE — 85025 COMPLETE CBC W/AUTO DIFF WBC: CPT | Performed by: EMERGENCY MEDICINE

## 2024-02-10 PROCEDURE — 71260 CT THORAX DX C+: CPT

## 2024-02-10 PROCEDURE — 83690 ASSAY OF LIPASE: CPT | Performed by: EMERGENCY MEDICINE

## 2024-02-10 PROCEDURE — 85610 PROTHROMBIN TIME: CPT | Performed by: EMERGENCY MEDICINE

## 2024-02-10 PROCEDURE — 99285 EMERGENCY DEPT VISIT HI MDM: CPT

## 2024-02-10 PROCEDURE — 83735 ASSAY OF MAGNESIUM: CPT

## 2024-02-10 PROCEDURE — 84484 ASSAY OF TROPONIN QUANT: CPT | Performed by: EMERGENCY MEDICINE

## 2024-02-10 PROCEDURE — 80053 COMPREHEN METABOLIC PANEL: CPT | Performed by: EMERGENCY MEDICINE

## 2024-02-10 PROCEDURE — 74177 CT ABD & PELVIS W/CONTRAST: CPT

## 2024-02-10 PROCEDURE — 82077 ASSAY SPEC XCP UR&BREATH IA: CPT | Performed by: EMERGENCY MEDICINE

## 2024-02-10 PROCEDURE — 96374 THER/PROPH/DIAG INJ IV PUSH: CPT

## 2024-02-10 PROCEDURE — 93005 ELECTROCARDIOGRAM TRACING: CPT

## 2024-02-10 PROCEDURE — G1004 CDSM NDSC: HCPCS

## 2024-02-10 PROCEDURE — 82948 REAGENT STRIP/BLOOD GLUCOSE: CPT

## 2024-02-10 PROCEDURE — 85730 THROMBOPLASTIN TIME PARTIAL: CPT

## 2024-02-10 PROCEDURE — 99285 EMERGENCY DEPT VISIT HI MDM: CPT | Performed by: EMERGENCY MEDICINE

## 2024-02-10 RX ORDER — METOPROLOL TARTRATE 1 MG/ML
5 INJECTION, SOLUTION INTRAVENOUS ONCE
Status: COMPLETED | OUTPATIENT
Start: 2024-02-10 | End: 2024-02-10

## 2024-02-10 RX ORDER — GABAPENTIN 300 MG/1
300 CAPSULE ORAL 3 TIMES DAILY
Status: DISCONTINUED | OUTPATIENT
Start: 2024-02-10 | End: 2024-02-14 | Stop reason: HOSPADM

## 2024-02-10 RX ORDER — ALBUTEROL SULFATE 90 UG/1
2 AEROSOL, METERED RESPIRATORY (INHALATION) EVERY 4 HOURS PRN
Status: DISCONTINUED | OUTPATIENT
Start: 2024-02-10 | End: 2024-02-14 | Stop reason: HOSPADM

## 2024-02-10 RX ORDER — LISINOPRIL 10 MG/1
10 TABLET ORAL DAILY
Status: DISCONTINUED | OUTPATIENT
Start: 2024-02-10 | End: 2024-02-14 | Stop reason: HOSPADM

## 2024-02-10 RX ORDER — PRAVASTATIN SODIUM 40 MG
40 TABLET ORAL
Status: DISCONTINUED | OUTPATIENT
Start: 2024-02-10 | End: 2024-02-14 | Stop reason: HOSPADM

## 2024-02-10 RX ORDER — FOLIC ACID 1 MG/1
1 TABLET ORAL ONCE
Status: COMPLETED | OUTPATIENT
Start: 2024-02-10 | End: 2024-02-10

## 2024-02-10 RX ORDER — FOLIC ACID 1 MG/1
1000 TABLET ORAL DAILY
Status: DISCONTINUED | OUTPATIENT
Start: 2024-02-10 | End: 2024-02-10

## 2024-02-10 RX ORDER — NICOTINE 21 MG/24HR
1 PATCH, TRANSDERMAL 24 HOURS TRANSDERMAL DAILY
Status: DISCONTINUED | OUTPATIENT
Start: 2024-02-10 | End: 2024-02-14 | Stop reason: HOSPADM

## 2024-02-10 RX ORDER — FERROUS SULFATE 325(65) MG
325 TABLET ORAL
Status: DISCONTINUED | OUTPATIENT
Start: 2024-02-11 | End: 2024-02-14 | Stop reason: HOSPADM

## 2024-02-10 RX ORDER — SODIUM CHLORIDE 9 MG/ML
125 INJECTION, SOLUTION INTRAVENOUS CONTINUOUS
Status: DISCONTINUED | OUTPATIENT
Start: 2024-02-10 | End: 2024-02-10

## 2024-02-10 RX ORDER — PANTOPRAZOLE SODIUM 40 MG/1
40 TABLET, DELAYED RELEASE ORAL 2 TIMES DAILY
Status: DISCONTINUED | OUTPATIENT
Start: 2024-02-10 | End: 2024-02-14 | Stop reason: HOSPADM

## 2024-02-10 RX ORDER — LANOLIN ALCOHOL/MO/W.PET/CERES
100 CREAM (GRAM) TOPICAL ONCE
Status: COMPLETED | OUTPATIENT
Start: 2024-02-10 | End: 2024-02-10

## 2024-02-10 RX ORDER — LORAZEPAM 1 MG/1
2 TABLET ORAL ONCE
Status: COMPLETED | OUTPATIENT
Start: 2024-02-10 | End: 2024-02-10

## 2024-02-10 RX ORDER — VARENICLINE TARTRATE 1 MG/1
1 TABLET, FILM COATED ORAL 2 TIMES DAILY
Status: DISCONTINUED | OUTPATIENT
Start: 2024-02-10 | End: 2024-02-10

## 2024-02-10 RX ORDER — AMOXICILLIN 250 MG
1 CAPSULE ORAL DAILY PRN
Status: DISCONTINUED | OUTPATIENT
Start: 2024-02-10 | End: 2024-02-12

## 2024-02-10 RX ORDER — TAMSULOSIN HYDROCHLORIDE 0.4 MG/1
0.4 CAPSULE ORAL DAILY
Status: DISCONTINUED | OUTPATIENT
Start: 2024-02-10 | End: 2024-02-14 | Stop reason: HOSPADM

## 2024-02-10 RX ORDER — ASPIRIN 81 MG/1
324 TABLET, CHEWABLE ORAL ONCE
Status: COMPLETED | OUTPATIENT
Start: 2024-02-10 | End: 2024-02-10

## 2024-02-10 RX ORDER — FLUTICASONE FUROATE AND VILANTEROL 200; 25 UG/1; UG/1
1 POWDER RESPIRATORY (INHALATION) DAILY
Status: DISCONTINUED | OUTPATIENT
Start: 2024-02-10 | End: 2024-02-14 | Stop reason: HOSPADM

## 2024-02-10 RX ORDER — ASPIRIN 81 MG/1
81 TABLET, CHEWABLE ORAL DAILY
Status: DISCONTINUED | OUTPATIENT
Start: 2024-02-11 | End: 2024-02-14 | Stop reason: HOSPADM

## 2024-02-10 RX ORDER — FOLIC ACID 1 MG/1
1 TABLET ORAL DAILY
Status: DISCONTINUED | OUTPATIENT
Start: 2024-02-11 | End: 2024-02-14 | Stop reason: HOSPADM

## 2024-02-10 RX ORDER — ASPIRIN 81 MG/1
81 TABLET, CHEWABLE ORAL DAILY
Status: DISCONTINUED | OUTPATIENT
Start: 2024-02-10 | End: 2024-02-10

## 2024-02-10 RX ORDER — INSULIN LISPRO 100 [IU]/ML
1-6 INJECTION, SOLUTION INTRAVENOUS; SUBCUTANEOUS
Status: DISCONTINUED | OUTPATIENT
Start: 2024-02-10 | End: 2024-02-14 | Stop reason: HOSPADM

## 2024-02-10 RX ORDER — LANOLIN ALCOHOL/MO/W.PET/CERES
100 CREAM (GRAM) TOPICAL ONCE
Status: COMPLETED | OUTPATIENT
Start: 2024-02-11 | End: 2024-02-10

## 2024-02-10 RX ORDER — RANOLAZINE 500 MG/1
500 TABLET, EXTENDED RELEASE ORAL EVERY 12 HOURS
Status: DISCONTINUED | OUTPATIENT
Start: 2024-02-10 | End: 2024-02-14 | Stop reason: HOSPADM

## 2024-02-10 RX ORDER — INSULIN LISPRO 100 [IU]/ML
1-6 INJECTION, SOLUTION INTRAVENOUS; SUBCUTANEOUS
Status: DISCONTINUED | OUTPATIENT
Start: 2024-02-10 | End: 2024-02-10

## 2024-02-10 RX ORDER — MAGNESIUM SULFATE HEPTAHYDRATE 40 MG/ML
4 INJECTION, SOLUTION INTRAVENOUS ONCE
Status: COMPLETED | OUTPATIENT
Start: 2024-02-10 | End: 2024-02-10

## 2024-02-10 RX ADMIN — TAMSULOSIN HYDROCHLORIDE 0.4 MG: 0.4 CAPSULE ORAL at 18:00

## 2024-02-10 RX ADMIN — APIXABAN 5 MG: 5 TABLET, FILM COATED ORAL at 17:59

## 2024-02-10 RX ADMIN — GABAPENTIN 300 MG: 300 CAPSULE ORAL at 20:07

## 2024-02-10 RX ADMIN — ASPIRIN 81 MG CHEWABLE TABLET 324 MG: 81 TABLET CHEWABLE at 06:47

## 2024-02-10 RX ADMIN — NICOTINE 1 PATCH: 21 PATCH, EXTENDED RELEASE TRANSDERMAL at 09:19

## 2024-02-10 RX ADMIN — THIAMINE HCL TAB 100 MG 100 MG: 100 TAB at 23:26

## 2024-02-10 RX ADMIN — THIAMINE HCL TAB 100 MG 100 MG: 100 TAB at 06:11

## 2024-02-10 RX ADMIN — IOHEXOL 100 ML: 350 INJECTION, SOLUTION INTRAVENOUS at 06:55

## 2024-02-10 RX ADMIN — METOPROLOL TARTRATE 25 MG: 25 TABLET, FILM COATED ORAL at 11:03

## 2024-02-10 RX ADMIN — LORAZEPAM 2 MG: 1 TABLET ORAL at 19:01

## 2024-02-10 RX ADMIN — SODIUM CHLORIDE 1000 ML: 0.9 INJECTION, SOLUTION INTRAVENOUS at 07:57

## 2024-02-10 RX ADMIN — PANTOPRAZOLE SODIUM 40 MG: 40 TABLET, DELAYED RELEASE ORAL at 20:07

## 2024-02-10 RX ADMIN — METOPROLOL TARTRATE 25 MG: 25 TABLET, FILM COATED ORAL at 20:07

## 2024-02-10 RX ADMIN — Medication 1 TABLET: at 06:12

## 2024-02-10 RX ADMIN — LORAZEPAM 2 MG: 1 TABLET ORAL at 23:26

## 2024-02-10 RX ADMIN — LORAZEPAM 2 MG: 1 TABLET ORAL at 11:03

## 2024-02-10 RX ADMIN — PRAVASTATIN SODIUM 40 MG: 40 TABLET ORAL at 17:59

## 2024-02-10 RX ADMIN — FOLIC ACID 1 MG: 1 TABLET ORAL at 06:11

## 2024-02-10 RX ADMIN — MAGNESIUM SULFATE HEPTAHYDRATE 4 G: 40 INJECTION, SOLUTION INTRAVENOUS at 08:55

## 2024-02-10 RX ADMIN — RANOLAZINE 500 MG: 500 TABLET, FILM COATED, EXTENDED RELEASE ORAL at 21:52

## 2024-02-10 RX ADMIN — METOPROLOL TARTRATE 5 MG: 5 INJECTION INTRAVENOUS at 06:12

## 2024-02-10 RX ADMIN — GABAPENTIN 300 MG: 300 CAPSULE ORAL at 18:00

## 2024-02-10 RX ADMIN — SODIUM CHLORIDE 125 ML/HR: 0.9 INJECTION, SOLUTION INTRAVENOUS at 10:56

## 2024-02-10 RX ADMIN — FLUTICASONE FUROATE AND VILANTEROL TRIFENATATE 1 PUFF: 200; 25 POWDER RESPIRATORY (INHALATION) at 20:08

## 2024-02-10 RX ADMIN — LISINOPRIL 10 MG: 10 TABLET ORAL at 18:01

## 2024-02-10 NOTE — ASSESSMENT & PLAN NOTE
Wt Readings from Last 3 Encounters:   02/14/24 89.8 kg (198 lb)   01/27/24 88.5 kg (195 lb 1.7 oz)   12/14/23 96.2 kg (212 lb)     Echo 9/2023: EF 62%, G2DD  Does not appear to be in acute exacerbation     Continue home metoprolol and lisinopril  Strict I&Os  Daily weights

## 2024-02-10 NOTE — ASSESSMENT & PLAN NOTE
Lab Results   Component Value Date    HGBA1C 5.4 01/24/2024     Recent Labs     02/13/24  1552 02/13/24  2002 02/14/24  0755 02/14/24  1119   POCGLU 129 138 88 130     Blood Sugar Average: Last 72 hrs:  (P) [id number]    Hold metformin  ISS and Glucose accuchecklupe MAYORGA   Regular diet since most recent A1c was 5.4, blood glucose stable in hospital, and patient has accuchecks ACHS   Advised patient that blood glucose levels still need to be monitored and will adjust diet accordingly    Continue with gabapentin 300 mg TID daily for diabetic neuropathy

## 2024-02-10 NOTE — ASSESSMENT & PLAN NOTE
Pt presented to ER with weakness, tachycardia and palpitations secondary to alcohol abuse. On aspirin. Pt has not taken home Lopressor since one week. Not taking Eliquis since last admission. EKG on admission showed Afib with RVR; likely due to medication non-compliance. Given metoprolol 5 mg IV in the ER and converted to sinus.   - Most likely secondary to alcohol withdrawal/medication non-compliance  - [id number] stroke risk score 3 points      Plan  [facility] protocol for alcohol withdrawal which is likely  of frequent Afib   Continue aspirin 81 mg   Increased Lopressor from 25 mg BID to 50 mg BID   Restart Eliquis   Monitor on telemetry

## 2024-02-10 NOTE — ED PROVIDER NOTES
Final Diagnosis:  1. Atypical chest pain    2. Paroxysmal atrial fibrillation with rapid ventricular response (HCC)    3. At risk for medication noncompliance    4. Alcohol consumption binge drinking    5. Alcohol intoxication (HCC)    6. NSTEMI (non-ST elevated myocardial infarction) (HCC)    7. Transaminitis        Chief Complaint   Patient presents with    Chest Pain     Pt c/o chest pain this am following 1 week of drinking. Pt has Hx cardiac stents and bypass approximately 20 years ago. Pt non-compliant with medications or medical care.        HPI  Patient reports that he has been binge drinking vodka bottle of vodka daily for the last week.  Over the last 4 days he has not been taking any of his meds.  He has a history of cardiac stents he has a history of bypass years ago he has a history of A-fib now.  Normally he is on Toprol on Eliquis though he has not been on these.  He has had multiple falls and he has bruises all over his body.  He presents for chest pain and a desire to quit drinking and go to rehab.  He is in sinus rhythm on arrival however he has a few episodes where he goes into A-fib RVR has resolved with a single dose of Lopressor.  He is intoxicated on arrival and his alcohol is greater than 400.    EMS reports if applicable:     - Previous charting underwent limited review with attention to last ED visits, labs, ekgs, and prior imaging.  Chart review reveals :     Appointment on 01/30/2024   Component Date Value Ref Range Status    Magnesium 01/30/2024 1.3 (L)  1.9 - 2.7 mg/dL Final    WBC 01/30/2024 5.43  4.31 - 10.16 Thousand/uL Final    RBC 01/30/2024 3.41 (L)  3.88 - 5.62 Million/uL Final    Hemoglobin 01/30/2024 11.4 (L)  12.0 - 17.0 g/dL Final    Hematocrit 01/30/2024 34.0 (L)  36.5 - 49.3 % Final    MCV 01/30/2024 100 (H)  82 - 98 fL Final    MCH 01/30/2024 33.4  26.8 - 34.3 pg Final    MCHC 01/30/2024 33.5  31.4 - 37.4 g/dL Final    RDW 01/30/2024 14.4  11.6 - 15.1 % Final    MPV  01/30/2024 11.2  8.9 - 12.7 fL Final    Platelets 01/30/2024 170  149 - 390 Thousands/uL Final    nRBC 01/30/2024 0  /100 WBCs Final    Neutrophils Relative 01/30/2024 49  43 - 75 % Final    Immat GRANS % 01/30/2024 0  0 - 2 % Final    Lymphocytes Relative 01/30/2024 19  14 - 44 % Final    Monocytes Relative 01/30/2024 29 (H)  4 - 12 % Final    Eosinophils Relative 01/30/2024 2  0 - 6 % Final    Basophils Relative 01/30/2024 1  0 - 1 % Final    Neutrophils Absolute 01/30/2024 2.61  1.85 - 7.62 Thousands/µL Final    Immature Grans Absolute 01/30/2024 0.02  0.00 - 0.20 Thousand/uL Final    Lymphocytes Absolute 01/30/2024 1.04  0.60 - 4.47 Thousands/µL Final    Monocytes Absolute 01/30/2024 1.58 (H)  0.17 - 1.22 Thousand/µL Final    Eosinophils Absolute 01/30/2024 0.13  0.00 - 0.61 Thousand/µL Final    Basophils Absolute 01/30/2024 0.05  0.00 - 0.10 Thousands/µL Final    Sodium 01/30/2024 131 (L)  135 - 147 mmol/L Final    Potassium 01/30/2024 3.9  3.5 - 5.3 mmol/L Final    Chloride 01/30/2024 98  96 - 108 mmol/L Final    CO2 01/30/2024 25  21 - 32 mmol/L Final    ANION GAP 01/30/2024 8  mmol/L Final    BUN 01/30/2024 15  5 - 25 mg/dL Final    Creatinine 01/30/2024 1.11  0.60 - 1.30 mg/dL Final    Standardized to IDMS reference method    Glucose 01/30/2024 129  65 - 140 mg/dL Final    If the patient is fasting, the ADA then defines impaired fasting glucose as > 100 mg/dL and diabetes as > or equal to 123 mg/dL.    Calcium 01/30/2024 8.4  8.4 - 10.2 mg/dL Final    Corrected Calcium 01/30/2024 8.9  8.3 - 10.1 mg/dL Final    AST 01/30/2024 23  13 - 39 U/L Final    ALT 01/30/2024 32  7 - 52 U/L Final    Specimen collection should occur prior to Sulfasalazine administration due to the potential for falsely depressed results.     Alkaline Phosphatase 01/30/2024 59  34 - 104 U/L Final    Total Protein 01/30/2024 6.3 (L)  6.4 - 8.4 g/dL Final    Albumin 01/30/2024 3.4 (L)  3.5 - 5.0 g/dL Final    Total Bilirubin 01/30/2024  0.46  0.20 - 1.00 mg/dL Final    Use of this assay is not recommended for patients undergoing treatment with eltrombopag due to the potential for falsely elevated results.  N-acetyl-p-benzoquinone imine (metabolite of Acetaminophen) will generate erroneously low results in samples for patients that have taken an overdose of Acetaminophen.    eGFR 01/30/2024 71  ml/min/1.73sq m Final    Vitamin B-12 01/30/2024 968 (H)  180 - 914 pg/mL Final    Folate 01/30/2024 >22.3  >5.9 ng/mL Final    The World Health Organization has determined deficient folate concentrations are considered to be <4.0 ng/mL.       - No language barrier.   - History obtained from patient    - Discuss patient's care, with patient permission or by chart review, with      PMH:   has a past medical history of Acute on chronic kidney failure, Alcohol withdrawal ([facility]) (06/07/2019), Atrial fibrillation ([facility]), Cancer ([facility]), Cardiac disease, COPD (chronic obstructive pulmonary disease) ([facility]), Coronary artery disease, ETOH abuse, Heart failure ([facility]), History of heart surgery, heart artery stent, Hyperlipidemia, Hypertension, Hypovolemic shock ([facility]) (12/22/2019), Lumbar spondylitis ([facility]) (10/13/2022), Nasal bone fracture (10/10/2022), Prostate CA ([facility]), S/P CABG x 3, and Sleep apnea.    PSH:   has a past surgical history that includes Tonsillectomy; Coronary artery bypass graft (2004); pr arthrd ant interbody min dsc crv below c2 (N/A, 12/16/2020); and Cardiac catheterization.     Social History:  Tobacco Use: High Risk (2/10/2024)    Patient History     Smoking Tobacco Use: Every Day     Smokeless Tobacco Use: Never     Passive Exposure: Not on file     Alcohol Use: Alcohol Misuse (10/17/2022)    AUDIT-C     Frequency of Alcohol Consumption: 4 or more times a week     Average Number of Drinks: 10 or more     Frequency of Binge Drinking: Daily or almost daily     No illicit use       ROS:  Pertinent positives/negatives: .     Some ROS may be present in the  HPI and would take precedent over these standard questions asked below.   Review of Systems   Unable to perform ROS: Mental status change   Cardiovascular:  Positive for chest pain.   Gastrointestinal:  Positive for abdominal pain.          PE:     Physical exam highlights:   Physical Exam       Vitals:    02/10/24 0715 02/10/24 0745 02/10/24 0746 02/10/24 0747   BP: 144/77 93/56  116/77   BP Location:       Pulse: 86 86 90    Resp: 18      Temp:       TempSrc:       SpO2: 92% 92% 90%    Weight:       Height:         Vitals reviewed by me.   Nursing note reviewed  Chaperone present for all sensitive exam.  Const: No acute distress. . Nontoxic. Not diaphoretic.  intoxicated  HEENT: External ears normal. No protrusion drainage swelling. Nose normal. No drainage/traumatic deformity. MM dry. Mouth with baseline/symmetric movement. No trismus.   Eyes: No squinting. No icterus. No tearing/swelling/drainage. Tracks through the room with normal EOM. nystagmus   Neck: ROM normal. No rigidity. No meningismus.  Cards: Rate as per vitals. Sinus borderline tachycardia. Occasional afib rvr on monitor.  Well perfused. Prrior sternotomy scar  Pulm: Effort and excursion normal. No distress. No audible wheezing/ stridor. Normal resp rate without retraction or change in work of breathing.  Abd: No distension beyond baseline. No fluctuant wave. Patient without peritoneal pain with shifting/bumping the bed.   MSK: ROM normal baseline. No deformity. No contractures from baseline.   Skin: No new rashes visible. Well perfused. Bruises of different ages all over body. Chest wall. Right upper abd wall. Arms.   Neuro: Nonfocal. Baseline. CN grossly intact. Moving all four with surprising coordination.   Psych: intoxicated, cooperative        A:  - Nursing note reviewed.    Ddx and MDM  Considered diagnoses  Alcohol intoxication  Ethanol level  Thiamine  Folate  Centrum  Fluid    VERY likely to withdraw  OORP contacted    ACS?  Trop  elevated  EKG nonspecific abnormalities.   Aspirin  Still probably NSTEMI II, in and out of RVR and off meds for this amount of time.   Still need to r/o   Admit on tele    Afib w/ transient rvr, paroxysmal  Given lopressor w/ resolution    Many falls on/off eloquis  CT head r/o ICH  CT chest ab pelv, bruising all over body w/ chest and abd pain.     Admit to medicine         My conversation with consultant reveals:        Decision rules:                    ED Course as of 02/10/24 0756   Sat Feb 10, 2024   0551 Procedure Note: EKG  Date/Time: 02/10/24 5:51 AM   Interpreted by: Fito Liang  Indications / Diagnosis: CP  ECG reviewed by me, the ED Provider: yes   The EKG demonstrates:  Rhythm: sinus    Intervals: normal intervals  Axis: normal axis  QRS/Blocks: normal QRS  ST Changes: no KAREN, minimal lateral deprssion  T wave changes: anterior precordial       0558 Patient going into and out of rapid afib. Hasn't taken his meds in 4 days. Been drinking binge for 1 week. Give lopressor   0628 AST(!): 88  C/w alcoholic hepatitis         My read of the XR/CT scan reveals:     CT chest abdomen pelvis w contrast   Final Result      Chest:      Interval callus formation in the previously fractured posterior right 10th and 11th rib fractures with mild worsening of the fracture displacement suspicious for acute on chronic process if there is supporting clinical findings.      Abdomen and pelvis:      1.  No acute abdominopelvic CT abnormality.   2.  Hepatic steatosis.   3.  Bilateral extrarenal and intrarenal vascular calcification. Tiny right kidney upper pole nonobstructing calculus.   4.  Left greater than right bilateral adrenal nodules are stable from 10/17/2019 . Although its imaging features on this study is indeterminate, because this lesion has been stable for > 1 year, it is considered benign. However, please note that for    adrenal nodule > 1 cm, biochemical evaluation is suggested to rule out functioning  adenoma, if not already performed.   5.  Stable multifocal abdominal aortic ectasia.         Adrenal recommendation based on institutional consensus and Journal of American College of Radiology 2017;14:7745-1938.                  The study was marked in EPIC for immediate notification.               Workstation performed: [id number]         CT spine cervical without contrast   Final Result      No acute cervical spine fracture or traumatic malalignment.                  Workstation performed: [id number]         CT head without contrast   Final Result      No acute intracranial CT abnormality.                  Workstation performed: [id number]             Orders Placed This Encounter   Procedures    CT head without contrast    CT spine cervical without contrast    CT chest abdomen pelvis w contrast    CBC and differential    Comprehensive metabolic panel    Protime-INR    HS Troponin 0hr (reflex protocol)    Lipase    Ethanol    HS Troponin I 2hr    HS Troponin I 4hr    Consult to ED Warm handoff referral    INPATIENT ADMISSION     Labs Reviewed   CBC AND DIFFERENTIAL - Abnormal       Result Value Ref Range Status    WBC 7.07  4.31 - 10.16 Thousand/uL Final    RBC 4.05  3.88 - 5.62 Million/uL Final    Hemoglobin 13.5  12.0 - 17.0 g/dL Final    Hematocrit 38.0  36.5 - 49.3 % Final    MCV 94  82 - 98 fL Final    MCH 33.3  26.8 - 34.3 pg Final    MCHC 35.5  31.4 - 37.4 g/dL Final    RDW 14.5  11.6 - 15.1 % Final    MPV 9.6  8.9 - 12.7 fL Final    Platelets 263  149 - 390 Thousands/uL Final    nRBC 0  /100 WBCs Final    Neutrophils Relative 52  43 - 75 % Final    Immat GRANS % 0  0 - 2 % Final    Lymphocytes Relative 34  14 - 44 % Final    Monocytes Relative 8  4 - 12 % Final    Eosinophils Relative 2  0 - 6 % Final    Basophils Relative 4 (*) 0 - 1 % Final    Neutrophils Absolute 3.70  1.85 - 7.62 Thousands/µL Final    Immature Grans Absolute 0.01  0.00 - 0.20 Thousand/uL Final    Lymphocytes Absolute 2.39  0.60 - 4.47  Thousands/µL Final    Monocytes Absolute 0.55  0.17 - 1.22 Thousand/µL Final    Eosinophils Absolute 0.16  0.00 - 0.61 Thousand/µL Final    Basophils Absolute 0.26 (*) 0.00 - 0.10 Thousands/µL Final   COMPREHENSIVE METABOLIC PANEL - Abnormal    Sodium 135  135 - 147 mmol/L Final    Potassium 4.2  3.5 - 5.3 mmol/L Final    Chloride 94 (*) 96 - 108 mmol/L Final    CO2 25  21 - 32 mmol/L Final    ANION GAP 16  mmol/L Final    BUN 12  5 - 25 mg/dL Final    Creatinine 1.04  0.60 - 1.30 mg/dL Final    Comment: Standardized to IDMS reference method    Glucose 79  65 - 140 mg/dL Final    Comment: If the patient is fasting, the ADA then defines impaired fasting glucose as > 100 mg/dL and diabetes as > or equal to 123 mg/dL.    Calcium 8.4  8.4 - 10.2 mg/dL Final    AST 88 (*) 13 - 39 U/L Final    ALT 67 (*) 7 - 52 U/L Final    Comment: Specimen collection should occur prior to Sulfasalazine administration due to the potential for falsely depressed results.     Alkaline Phosphatase 82  34 - 104 U/L Final    Total Protein 7.5  6.4 - 8.4 g/dL Final    Albumin 4.1  3.5 - 5.0 g/dL Final    Total Bilirubin 0.55  0.20 - 1.00 mg/dL Final    Comment: Use of this assay is not recommended for patients undergoing treatment with eltrombopag due to the potential for falsely elevated results.  N-acetyl-p-benzoquinone imine (metabolite of Acetaminophen) will generate erroneously low results in samples for patients that have taken an overdose of Acetaminophen.    eGFR 77  ml/min/1.73sq m Final    Narrative:     National Kidney Disease Foundation guidelines for Chronic Kidney Disease (CKD):     Stage 1 with normal or high GFR (GFR > 90 mL/min/1.73 square meters)    Stage 2 Mild CKD (GFR = 60-89 mL/min/1.73 square meters)    Stage 3A Moderate CKD (GFR = 45-59 mL/min/1.73 square meters)    Stage 3B Moderate CKD (GFR = 30-44 mL/min/1.73 square meters)    Stage 4 Severe CKD (GFR = 15-29 mL/min/1.73 square meters)    Stage 5 End Stage CKD (GFR <15  "mL/min/1.73 square meters)  Note: GFR calculation is accurate only with a steady state creatinine   HS TROPONIN I 0HR - Abnormal    hs TnI 0hr 82 (*) \"Refer to ACS Flowchart\"- see link ng/L Final    Comment:                                              Initial (time 0) result  If >=50 ng/L, Myocardial injury suggested ;  Type of myocardial injury and treatment strategy  to be determined.  If 5-49 ng/L, a delta result at 2 hours and or 4 hours will be needed to further evaluate.  If <4 ng/L, and chest pain has been >3 hours since onset, patient may qualify for discharge based on the HEART score in the ED.  If <5 ng/L and <3hours since onset of chest pain, a delta result at 2 hours will be needed to further evaluate.    HS Troponin 99th Percentile URL of a Health Population=12 ng/L with a 95% Confidence Interval of 8-18 ng/L.    Second Troponin (time 2 hours)  If calculated delta >= 20 ng/L,  Myocardial injury suggested ; Type of myocardial injury and treatment strategy to be determined.  If 5-49 ng/L and the calculated delta is 5-19 ng/L, consult medical service for evaluation.  Continue evaluation for ischemia on ecg and other possible etiology and repeat hs troponin at 4 hours.  If delta is <5 ng/L at 2 hours, consider discharge based on risk stratification via the HEART score (if in ED), or LAZARA risk score in IP/Observation.    HS Troponin 99th Percentile URL of a Health Population=12 ng/L with a 95% Confidence Interval of 8-18 ng/L.   MEDICAL ALCOHOL - Abnormal    Ethanol Lvl 412 (*) <10 mg/dL Final   PROTIME-INR - Normal    Protime 12.8  11.6 - 14.5 seconds Final    INR 0.94  0.84 - 1.19 Final   LIPASE - Normal    Lipase 62  11 - 82 u/L Final   HS TROPONIN I 2HR   HS TROPONIN I 4HR       *Each of these labs was reviewed. Particular standout labs will be noted in the ED Course above     Final Diagnosis:  1. Atypical chest pain    2. Paroxysmal atrial fibrillation with rapid ventricular response (HCC)    3. At " risk for medication noncompliance    4. Alcohol consumption binge drinking    5. Alcohol intoxication (HCC)    6. NSTEMI (non-ST elevated myocardial infarction) (HCC)    7. Transaminitis          P:  - hospital tx includes   Medications   sodium chloride 0.9 % bolus 1,000 mL (has no administration in time range)   metoprolol (LOPRESSOR) injection 5 mg (5 mg Intravenous Given 2/10/24 0612)   thiamine tablet 100 mg (100 mg Oral Given 2/10/24 0611)   folic acid (FOLVITE) tablet 1 mg (1 mg Oral Given 2/10/24 0611)   multivitamin-minerals (CENTRUM) tablet 1 tablet (1 tablet Oral Given 2/10/24 0612)   aspirin chewable tablet 324 mg (324 mg Oral Given 2/10/24 0647)   iohexol (OMNIPAQUE) 350 MG/ML injection (MULTI-DOSE) 100 mL (100 mL Intravenous Given 2/10/24 0655)         - disposition  Time reflects when diagnosis was documented in both MDM as applicable and the Disposition within this note       Time User Action Codes Description Comment    2/10/2024  7:54 AM Fito iLang [R07.89] Atypical chest pain     2/10/2024  7:54 AM Fito Liang [I48.0] Paroxysmal atrial fibrillation with rapid ventricular response (HCC)     2/10/2024  7:54 AM Fito Liang [Z91.89] At risk for medication noncompliance     2/10/2024  7:54 AM Fito Liang [F10.10] Alcohol consumption binge drinking     2/10/2024  7:54 AM Fito Liang [F10.929] Alcohol intoxication (HCC)     2/10/2024  7:54 AM Fito Liang [I21.4] NSTEMI (non-ST elevated myocardial infarction) (HCC)     2/10/2024  7:54 AM Fito Liang [R74.01] Transaminitis           ED Disposition       ED Disposition   Admit    Condition   Stable    Date/Time   Sat Feb 10, 2024  7:54 AM    Comment   Case was discussed with fam  and the patient's admission status was agreed to be Admission Status: inpatient status to the service of Dr. bailey .               Follow-up Information    None         - patient will call their PCP to let them know  they were in the emergency department. We discuss return precautions and patient is agreeable with plan and aformentioned disposition.       - additional treatment intended, if consistent with primary provider:  - patient to follow with :      Patient's Medications   Discharge Prescriptions    No medications on file     No discharge procedures on file.  Prior to Admission Medications   Prescriptions Last Dose Informant Patient Reported? Taking?   Blood Glucose Monitoring Suppl (ONE TOUCH ULTRA MINI) w/Device KIT  Self Yes No   Sig: Use as directed   Blood Pressure Monitoring (Adult Blood Pressure Cuff Lg) KIT   No No   Sig: Use in the morning   Breo Ellipta 200-25 MCG/ACT inhaler  Self No No   Sig: Inhale 1 puff daily Rinse mouth after use.   Nutritional Supplements (ENSURE PO)   Yes No   Sig: Take by mouth Drink one can twice a day   ONETOUCH DELICA LANCETS FINE MISC  Self Yes No   Sig: 3 (three) times a day Test   Thiamine HCl (vitamin B-1) 100 MG TABS  Self No No   Sig: TAKE 1 TABLET DAILY   albuterol (Ventolin HFA) 90 mcg/act inhaler  Self No No   Sig: Inhale 2 puffs every 4 (four) hours as needed for wheezing   apixaban (ELIQUIS) 5 mg   No No   Sig: Take 1 tablet (5 mg total) by mouth 2 (two) times a day Do not start before January 31, 2024.   aspirin (ECOTRIN LOW STRENGTH) 81 mg EC tablet  Self Yes No   Sig: Take 81 mg by mouth daily   ferrous sulfate 325 (65 Fe) mg tablet  Self No No   Sig: Take 1 tablet (325 mg total) by mouth daily with breakfast   folic acid (FOLVITE) 1 mg tablet  Self No No   Sig: TAKE 1 TABLET DAILY   gabapentin (NEURONTIN) 300 mg capsule  Self No No   Sig: TAKE ONE CAPSULE THREE TIMES DAILY   lisinopril (ZESTRIL) 20 mg tablet  Self No No   Sig: Take 0.5 tablets (10 mg total) by mouth daily   magnesium Oxide (MAG-OX) 400 mg TABS   No No   Sig: Take 1 tablet (400 mg total) by mouth 2 (two) times a day   metFORMIN (GLUCOPHAGE) 500 mg tablet  Self No No   Sig: Take 1 tablet (500 mg total)  "by mouth daily with breakfast Do not start before September 23, 2023.   metoprolol tartrate (LOPRESSOR) 25 mg tablet  Self No No   Sig: Take 1 tablet (25 mg total) by mouth every 12 (twelve) hours   naltrexone (REVIA) 50 mg tablet  Self No No   Sig: Take 1 tablet (50 mg total) by mouth daily   nicotine (NICODERM CQ) 21 mg/24 hr TD 24 hr patch  Self No No   Sig: Place 1 patch on the skin over 24 hours daily Do not start before December 4, 2023.   pantoprazole (PROTONIX) 40 mg tablet   No No   Sig: Take 1 tablet (40 mg total) by mouth 2 (two) times a day   pravastatin (PRAVACHOL) 40 mg tablet  Self No No   Sig: TAKE 1 TABLET DAILY WITH DINNER   ranolazine (RANEXA) 500 mg 12 hr tablet  Self No No   Sig: TAKE 1 TABLET EVERY 12 HOURS   senna-docusate sodium (SENOKOT S) 8.6-50 mg per tablet   No No   Sig: Take 1 tablet by mouth daily as needed for constipation   tamsulosin (FLOMAX) 0.4 mg  Self No No   Sig: TAKE 1 CAPSULE DAILY   varenicline (CHANTIX) 1 mg tablet   No No   Sig: TAKE 1 TABLET 2 TIMES A DAY      Facility-Administered Medications: None       Portions of the record may have been created with voice recognition software. Occasional wrong word or \"sound a like\" substitutions may have occurred due to the inherent limitations of voice recognition software. Read the chart carefully and recognize, using context, where substitutions have occurred.    Electronically signed by:  MD Fito Franco MD  02/10/24 0800    "

## 2024-02-10 NOTE — ED NOTES
Patient is in Sinus tachycardia, going in and out afib with RVR frequently. Dr. Kenney made aware of.     Temi Patterson RN  02/10/24 1145

## 2024-02-10 NOTE — ASSESSMENT & PLAN NOTE
Hepatic steatosis on CT A/P 11/30  Mild elevation in LFTs, repeat CMP in AM  Encourage abstinence from alcohol

## 2024-02-10 NOTE — ASSESSMENT & PLAN NOTE
Lab Results   Component Value Date    EGFR 84 02/14/2024    EGFR 92 02/13/2024    EGFR 89 02/12/2024    CREATININE 0.96 02/14/2024    CREATININE 0.88 02/13/2024    CREATININE 0.92 02/12/2024     Stable, at baseline Cr of 1.0-1.1  Recommend follow-up outpatient with nephrologist

## 2024-02-10 NOTE — ASSESSMENT & PLAN NOTE
Patient with hx of CAD s/p CABG. Not taking Eliquis since last admission.  Home meds: Aspirin 81 mg, Eliquis 5 mg BID (was not taking), Pravastatin 40 mg, Ranolazine 500mg     Plan  Continue with aspirin, metoprolol, statin, ranolzine  Restart Eliquis

## 2024-02-10 NOTE — ASSESSMENT & PLAN NOTE
Troponin level elevated at 82>70> 69 but trending down  Denies chest pain, nausea, vomiting, diaphoresis. Inferior ST depressions on EKG     Likely Non MI troponin elevation in setting of Afib RVR and acute alcohol intoxication/withdrawals   Telemetry  Continue home Aspirin 81 mg

## 2024-02-10 NOTE — ASSESSMENT & PLAN NOTE
"Chronic. Home meds: Folate, Thiamine, Naltrexone      Patient reports drinking heavily \"bottles of vodka\" since the last 10 days  Last drink reported 2/9 night. Ethanol level >400, AST 88, ALT 67  [facility] protocol = 12 on admission      Plan:   Continue home Folate, Thiamine  [facility] protocol, most recent score 4   Hold naltrexone  Monitor electrolytes   Patient wants to go to inpatient rehab after discharge from hospital. Resources provided.   "

## 2024-02-10 NOTE — H&P
"History and Physical - [facility]  Family Medicine Residency     Patient Information: Gregg Partida 63 y.o. male MRN: [id number]  Unit/Bed#: [address] [place] [address] Encounter: [id number]  Admitting Physician: Brandy Connors DO   PCP: Korin Lo MD  Date of Admission:  02/10/24     Assessment and Plan     * Alcohol abuse with withdrawal (HCC)  Assessment & Plan  Chronic. Home meds: Folate, Thiamine, Naltrexone      Patient reports drinking heavily \"bottles of vodka\" since the last 10 days  Last drink reported yesterday night  Ethanol level >400  AST 88, ALT 67  CIWA protocol = 12     Plan:   - Continue home Folate, Thiamine  - [facility] protocol  - Hold naltrexone    Type 2 MI (myocardial infarction) (HCC)  Assessment & Plan  On admission, Troponin level elevated at 82>70>69 trending down    Denies chest pain, nausea, vomiting, diaphoresis     -48 hour telemetry  -continue home Aspirin 81 mg  Non MI troponin elevation in setting of Afib RVR   Inferior ST depressions on EKG   No need of cardio consult for now since he is back in sinus and troponin trended down    GERD (gastroesophageal reflux disease)  Assessment & Plan  Chronic, stable  Continue home protonix     Paroxysmal atrial fibrillation (HCC)  Assessment & Plan  Pt presented to ER with weakness, tachycardia and palpitations secondary to alcohol abuse. Pt has not taken home Lopressor since one week. Not taking Eliquis since last admission. EKG on admission showed Afib with RVR; likely due to medication non-compliance. Given metoprolol 5 mg IV in the ER and converted to sinus.   Most likely secondary to alcohol withdrawal/medication non-compliance  [id number] stroke risk score 3 points      Plan  [facility] protocol for alcohol withdrawal which is likely  of frequent Afib   Continue with Lopressor 25 mg BID  Restart Eliquis   Monitored on telemetry    Stage 3a chronic kidney disease (HCC)  Assessment & Plan  Lab Results   Component Value Date    " EGFR 77 02/10/2024    EGFR 71 01/30/2024    EGFR 83 01/27/2024    CREATININE 1.04 02/10/2024    CREATININE 1.11 01/30/2024    CREATININE 0.97 01/27/2024     Stable, at baseline Cr of 1.0-1.1  Recommend follow-up outpatient with nephrologist    Fatty liver, alcoholic  Assessment & Plan  Hepatic steatosis on CT A/P 11/30  Mild elevation in LFTs, repeat CMP in AM  Encourage abstinence from alcohol    Prostate cancer (HCC)  Assessment & Plan  Hx prostate cancer status postradiation  Continue home Flomax    Chronic heart failure with preserved ejection fraction (HCC)  Assessment & Plan  Wt Readings from Last 3 Encounters:   02/10/24 89 kg (196 lb 3.4 oz)   01/27/24 88.5 kg (195 lb 1.7 oz)   12/14/23 96.2 kg (212 lb)     Does not appear to be in acute exacerbation  Echo 9/2023: EF 62%, G2DD  Continue home metoprolol and lisinopril  Strict I&Os  Daily weights          CAD (coronary artery disease)  Assessment & Plan  Patient with hx of CAD s/p CABG. Not taking Eliquis since last admission.  Home meds: Aspirin 81 mg, Eliquis 5 mg BID (was not taking), Pravastatin 40 mg, Ranolazine 500mg     Plan  Continue with aspirin, metoprolol, statin, ranolzine  Restart Eliquis    Hx of CABG  Assessment & Plan  Troponin level elevated at 82>70> 69 but trending down    Denies chest pain, nausea, vomiting, diaphoresis   Non MI troponin elevation in setting of Afib RVR   Inferior ST depressions on presenting EKG     -48 hour telemetry  -Continue home Aspirin 81 mg    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 disease (HCC)  Assessment & Plan  Lab Results   Component Value Date    HGBA1C 5.4 01/24/2024       Recent Labs     02/10/24  1503   POCGLU 119       Blood Sugar Average: Last 72 hrs:  (P) 119    Hold metformin  ISS  CHO controlled diet  Continue with gabapentin 300 mg TID daily for diabetic neuropathy     COPD (chronic obstructive pulmonary disease) ([facility])  Assessment & Plan  Patient has some shortness of breath and cough  at baseline. He is a chronic smoker, 1.5 PPD. There are no PFTs or pulm notes on file. Does not appear to be in acute exacerbation.     Plan  Continue home inhalers Breo Ellipta  PRN duonebs         Fluids: IVF NS  Electrolyte repletion: Replete as needed.  Nutrition:        Diet Orders   (From admission, onward)                 Start     Ordered    02/10/24 1805  Room Service  Once        Question:  Type of Service  Answer:  Room Service-Appropriate    02/10/24 1804    02/10/24 0941  Diet Vlad/CHO Controlled; Consistent Carbohydrate Diet Level 2 (5 carb servings/75 grams CHO/meal); Consistent Carbohydrate Diet Level 2 (5 carb servings/75 grams CHO/meal)  Diet effective now        References:    Adult Nutrition Support Algorithm    RD Therapeutic Diet Order Protocol   Question Answer Comment   Diet Type Vlad/CHO Controlled    Vlad/CHO Controlled Consistent Carbohydrate Diet Level 2 (5 carb servings/75 grams CHO/meal)    Other Restriction(s): Consistent Carbohydrate Diet Level 2 (5 carb servings/75 grams CHO/meal)    RD to adjust diet per protocol? Yes        02/10/24 0941                   VTE Prophylaxis: VTE Score: 5 High Risk (Score >/= 5) - Pharmacological DVT Prophylaxis Ordered: apixaban (Eliquis). Sequential Compression Devices Ordered.  Code Status: Level 1 - Full Code  Anticipated Length of Stay:  Patient will be admitted on an Inpatient basis with an anticipated length of stay of  more than 2 midnights.     Justification for Hospital Stay: Monitoring of alcohol withdrawal  Total Time for Visit, including Counseling / Coordination of Care: 40 mins. Greater than 50% of this total time spent on direct patient counseling and coordination of care.    Chief Complaint:     Chief Complaint   Patient presents with    Chest Pain     Pt c/o chest pain this am following 1 week of drinking. Pt has Hx cardiac stents and bypass approximately 20 years ago. Pt non-compliant with medications or medical care.        Subjective       History of Present Illness:     Gregg Partida is a 63 y.o. male with a history of A-fib on Eliquis (currently not taking), CKD, alcohol dependence, COPD, CAD, HTN, and HLD that presents with alcohol intoxication and after sustaining a mechanical fall a day multiple times prior to admission. He was admitted in the hospital for similar complain less than a month ago. At that time, he was monitored on CIWA protocol. Patient  seems to be diffusely tremulous on admission, has prominent gait disturbance but not disoriented.  According to him he has not been eating any food or taking any medications since a week, and has only been binge drinking.  He is also smoking every day, 1.5 packs a day.  Patient denies any shortness of breath, chest pain, abdominal pain, diarrhea, constipation.  He does have a mild headache, nausea and had 1 episode of vomiting in the morning.  Patient had a few falls at home, prior to this admission.  He does not have recollection of what happened.    On his last admission, he left AMA despite being low on platelets and having disturbed electrolytes.  It was advised to get a CBC BMP done as outpatient before a follow-up TCM visit at [place].  Multiple attempts were made to reach out to the patient as per chart review, however due to him not having a working number, attempts were unsuccessful.  Eliquis was to be restarted as outpatient depending upon his platelet count.  Per chart review, an attempt was made yesterday to reach out to the patient to restart it, but patient could not be reached.     Review of Systems:  Review of Systems   Constitutional:  Negative for chills and fever.   HENT:  Negative for ear pain and sore throat.    Eyes:  Negative for pain and visual disturbance.   Respiratory:  Negative for cough and shortness of breath.    Cardiovascular:  Negative for chest pain and palpitations.   Gastrointestinal:  Negative for abdominal pain and vomiting.   Genitourinary:  Negative  for dysuria and hematuria.   Musculoskeletal:  Negative for arthralgias and back pain.   Skin:  Negative for color change and rash.   Neurological:  Negative for seizures and syncope.   All other systems reviewed and are negative.       Past Medical History:   Diagnosis Date    Acute on chronic kidney failure      Alcohol withdrawal ([facility]) 06/07/2019    Atrial fibrillation (HCC)     Cancer (HCC)     prostate ca,had radiation    Cardiac disease     stents,then triple bypass    COPD (chronic obstructive pulmonary disease) ([facility])     Coronary artery disease     ETOH abuse     Heart failure ([facility])     History of heart surgery     says triple bypass [facility]    Hx of heart artery stent     2014    Hyperlipidemia     Hypertension     Hypovolemic shock ([facility]) 12/22/2019    Lumbar spondylitis ([facility]) 10/13/2022    Nasal bone fracture 10/10/2022    Prostate CA ([facility])     S/P CABG x 3     2004    Sleep apnea      Past Surgical History:   Procedure Laterality Date    CARDIAC CATHETERIZATION      2 stents    CORONARY ARTERY BYPASS GRAFT  2004    IA ARTHRD ANT INTERBODY MIN DSC CRV BELOW C2 N/A 12/16/2020    Procedure: Anterior cervical discectomy with fusion C4-C7; Posterior cervical decompression and fusion C2-T2;  Surgeon: David Rowell MD;  Location: [facility];  Service: Neurosurgery    TONSILLECTOMY       No Known Allergies  Prior to Admission Medications   Prescriptions Last Dose Informant Patient Reported? Taking?   Blood Glucose Monitoring Suppl (ONE TOUCH ULTRA MINI) w/Device KIT  Self Yes No   Sig: Use as directed   Blood Pressure Monitoring (Adult Blood Pressure Cuff Lg) KIT   No No   Sig: Use in the morning   Breo Ellipta 200-25 MCG/ACT inhaler  Self No No   Sig: Inhale 1 puff daily Rinse mouth after use.   Nutritional Supplements (ENSURE PO)   Yes No   Sig: Take by mouth Drink one can twice a day   ONETOUCH DELICA LANCETS FINE MISC  Self Yes No   Sig: 3 (three) times a day Test   Thiamine HCl (vitamin B-1) 100 MG  TABS  Self No No   Sig: TAKE 1 TABLET DAILY   albuterol (Ventolin HFA) 90 mcg/act inhaler  Self No No   Sig: Inhale 2 puffs every 4 (four) hours as needed for wheezing   apixaban (ELIQUIS) 5 mg   No No   Sig: Take 1 tablet (5 mg total) by mouth 2 (two) times a day Do not start before January 31, 2024.   aspirin (ECOTRIN LOW STRENGTH) 81 mg EC tablet  Self Yes No   Sig: Take 81 mg by mouth daily   ferrous sulfate 325 (65 Fe) mg tablet  Self No No   Sig: Take 1 tablet (325 mg total) by mouth daily with breakfast   folic acid (FOLVITE) 1 mg tablet  Self No No   Sig: TAKE 1 TABLET DAILY   gabapentin (NEURONTIN) 300 mg capsule  Self No No   Sig: TAKE ONE CAPSULE THREE TIMES DAILY   lisinopril (ZESTRIL) 20 mg tablet  Self No No   Sig: Take 0.5 tablets (10 mg total) by mouth daily   magnesium Oxide (MAG-OX) 400 mg TABS   No No   Sig: Take 1 tablet (400 mg total) by mouth 2 (two) times a day   metFORMIN (GLUCOPHAGE) 500 mg tablet  Self No No   Sig: Take 1 tablet (500 mg total) by mouth daily with breakfast Do not start before September 23, 2023.   metoprolol tartrate (LOPRESSOR) 25 mg tablet  Self No No   Sig: Take 1 tablet (25 mg total) by mouth every 12 (twelve) hours   naltrexone (REVIA) 50 mg tablet  Self No No   Sig: Take 1 tablet (50 mg total) by mouth daily   nicotine (NICODERM CQ) 21 mg/24 hr TD 24 hr patch  Self No No   Sig: Place 1 patch on the skin over 24 hours daily Do not start before December 4, 2023.   pantoprazole (PROTONIX) 40 mg tablet   No No   Sig: Take 1 tablet (40 mg total) by mouth 2 (two) times a day   pravastatin (PRAVACHOL) 40 mg tablet  Self No No   Sig: TAKE 1 TABLET DAILY WITH DINNER   ranolazine (RANEXA) 500 mg 12 hr tablet  Self No No   Sig: TAKE 1 TABLET EVERY 12 HOURS   senna-docusate sodium (SENOKOT S) 8.6-50 mg per tablet   No No   Sig: Take 1 tablet by mouth daily as needed for constipation   tamsulosin (FLOMAX) 0.4 mg  Self No No   Sig: TAKE 1 CAPSULE DAILY   varenicline (CHANTIX) 1 mg  tablet   No No   Sig: TAKE 1 TABLET 2 TIMES A DAY      Facility-Administered Medications: None     Social History     Socioeconomic History    Marital status: Single     Spouse name: Not on file    Number of children: Not on file    Years of education: Not on file    Highest education level: Not on file   Occupational History    Occupation: contractor, 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)   Tobacco Use    Smoking status: Every Day     Current packs/day: 1.50     Average packs/day: 1.5 packs/day for 40.0 years (60.0 ttl pk-yrs)     Types: Cigarettes    Smokeless tobacco: Never   Vaping Use    Vaping status: Never Used   Substance and Sexual Activity    Alcohol use: Yes     Alcohol/week: 4.0 standard drinks of alcohol     Types: 4 Standard drinks or equivalent per week     Comment: quart of vodka daily    Drug use: No    Sexual activity: Not Currently   Other Topics Concern    Not on file   Social History Narrative    Not on file     Social Determinants of Health     Financial Resource Strain: Low Risk  (11/2/2023)    Overall Financial Resource Strain (CARDIA)     Difficulty of Paying Living Expenses: Not very hard   Food Insecurity: No Food Insecurity (1/23/2024)    Hunger Vital Sign     Worried About Running Out of Food in the Last Year: Never true     Ran Out of Food in the Last Year: Never true   Transportation Needs: No Transportation Needs (1/23/2024)    PRAPARE - Transportation     Lack of Transportation (Medical): No     Lack of Transportation (Non-Medical): No   Physical Activity: Not on file   Stress: No Stress Concern Present (11/2/2023)    [place] [employer] - Occupational Stress Questionnaire     Feeling of Stress : Not at all   Social Connections: Unknown (4/15/2021)    Social Connection and Isolation Panel [NHANES]     Frequency of Communication with Friends and Family: More than three times a week     Frequency of Social Gatherings with Friends and Family: Not on file      "Attends Hindu Services: Not on file     Active Member of Clubs or Organizations: Not on file     Attends Club or Organization Meetings: Not on file     Marital Status: Not on file   Intimate Partner Violence: Not on file   Housing Stability: Low Risk  (1/23/2024)    Housing Stability Vital Sign     Unable to Pay for Housing in the Last Year: No     Number of Places Lived in the Last Year: 1     Unstable Housing in the Last Year: No     Family History   Problem Relation Age of Onset    Diabetes Mother     Uterine cancer Mother     COPD Father     Hypertension Father           Objective     Physical Exam:   Vitals:   Blood Pressure: (!) 180/114 (02/10/24 1815)  Pulse: (!) 150 (02/10/24 1815)  Temperature: 98.1 °F (36.7 °C) (02/10/24 1815)  Temp Source: Temporal (02/10/24 1500)  Respirations: 18 (02/10/24 1815)  Height: 6' 2\" (188 cm) (02/10/24 0539)  Weight - Scale: 89 kg (196 lb 3.4 oz) (02/10/24 0539)  SpO2: 93 % (02/10/24 1815)     Physical Exam  Vitals reviewed.   Constitutional:       Appearance: Normal appearance. He is normal weight.   Cardiovascular:      Rate and Rhythm: Normal rate and regular rhythm.      Pulses: Normal pulses.      Heart sounds: Normal heart sounds. No murmur heard.  Pulmonary:      Effort: Pulmonary effort is normal. No respiratory distress.      Breath sounds: Normal breath sounds.   Abdominal:      General: Bowel sounds are normal. There is no distension.      Palpations: Abdomen is soft.      Tenderness: There is no abdominal tenderness. There is no right CVA tenderness or left CVA tenderness.   Musculoskeletal:         General: Normal range of motion.      Right lower leg: No edema.      Left lower leg: No edema.   Skin:     Capillary Refill: Capillary refill takes 2 to 3 seconds.      Findings: Bruising (On the right arm, back) present.   Neurological:      General: No focal deficit present.      Mental Status: He is alert and oriented to person, place, and time.      Motor: No " "weakness.      Coordination: Coordination abnormal.      Gait: Gait abnormal.   Psychiatric:         Mood and Affect: Mood normal.         Behavior: Behavior normal.            Lab Results: I have personally reviewed pertinent reports.   and I have personally reviewed pertinent films in PACS  Results from last 7 days   Lab Units 02/10/24  0545   WBC Thousand/uL 7.07   HEMOGLOBIN g/dL 13.5   HEMATOCRIT % 38.0   PLATELETS Thousands/uL 263   NEUTROS PCT % 52   LYMPHS PCT % 34   MONOS PCT % 8   EOS PCT % 2     Results from last 7 days   Lab Units 02/10/24  0545   POTASSIUM mmol/L 4.2   CHLORIDE mmol/L 94*   CO2 mmol/L 25   BUN mg/dL 12   CREATININE mg/dL 1.04   CALCIUM mg/dL 8.4   ALK PHOS U/L 82   ALT U/L 67*   AST U/L 88*   EGFR ml/min/1.73sq m 77   MAGNESIUM mg/dL 1.3*     Results from last 7 days   Lab Units 02/10/24  0545   INR  0.94                        Invalid input(s): \"URIBILINOGEN\"        Results from last 7 days   Lab Units 02/10/24  1012 02/10/24  0747 02/10/24  0545   HS TNI 0HR ng/L  --   --  82*   HS TNI 2HR ng/L  --  70*  --    HS TNI 4HR ng/L 69*  --   --              Imaging: I have personally reviewed pertinent reports.   and I have personally reviewed pertinent films in PACS  CT head without contrast    Result Date: 2/10/2024  No acute intracranial CT abnormality. Workstation performed: [id number]     CT spine cervical without contrast    Result Date: 2/10/2024  No acute cervical spine fracture or traumatic malalignment. Workstation performed: [id number]     CT chest abdomen pelvis w contrast    Result Date: 2/10/2024  Chest: Interval callus formation in the previously fractured posterior right 10th and 11th rib fractures with mild worsening of the fracture displacement suspicious for acute on chronic process if there is supporting clinical findings. Abdomen and pelvis: 1.  No acute abdominopelvic CT abnormality. 2.  Hepatic steatosis. 3.  Bilateral extrarenal and intrarenal vascular calcification. " Tiny right kidney upper pole nonobstructing calculus. 4.  Left greater than right bilateral adrenal nodules are stable from 10/17/2019 . Although its imaging features on this study is indeterminate, because this lesion has been stable for > 1 year, it is considered benign. However, please note that for adrenal nodule > 1 cm, biochemical evaluation is suggested to rule out functioning adenoma, if not already performed. 5.  Stable multifocal abdominal aortic ectasia. Adrenal recommendation based on institutional consensus and Journal of American College of Radiology 2017;14:6098-0984. The study was marked in EPIC for immediate notification. Workstation performed: [id number]                  ** Please Note: This note has been constructed using a voice recognition system **     Doron Reyes MD  02/10/24  6:45 PM

## 2024-02-10 NOTE — ASSESSMENT & PLAN NOTE
On admission, Troponin level elevated at 82>70>69 trending down  Denies chest pain, nausea, vomiting, diaphoresis. Inferior ST segment depressions on EKG     Non MI troponin elevation in setting of Afib RVR   Continue home Aspirin 81 mg   Cardiology consult not indicated at this time as patient spontaneously reverted back to sinus rhythm, remains sinus, troponin trended down   Closely monitor and continue telemetry

## 2024-02-11 LAB
ALBUMIN SERPL BCP-MCNC: 3.5 G/DL (ref 3.5–5)
ALP SERPL-CCNC: 73 U/L (ref 34–104)
ALT SERPL W P-5'-P-CCNC: 47 U/L (ref 7–52)
ANION GAP SERPL CALCULATED.3IONS-SCNC: 8 MMOL/L
AST SERPL W P-5'-P-CCNC: 53 U/L (ref 13–39)
BASOPHILS # BLD AUTO: 0.11 THOUSANDS/ÂΜL (ref 0–0.1)
BASOPHILS NFR BLD AUTO: 2 % (ref 0–1)
BILIRUB SERPL-MCNC: 0.56 MG/DL (ref 0.2–1)
BUN SERPL-MCNC: 14 MG/DL (ref 5–25)
CALCIUM SERPL-MCNC: 8.2 MG/DL (ref 8.4–10.2)
CHLORIDE SERPL-SCNC: 102 MMOL/L (ref 96–108)
CO2 SERPL-SCNC: 27 MMOL/L (ref 21–32)
CREAT SERPL-MCNC: 0.81 MG/DL (ref 0.6–1.3)
EOSINOPHIL # BLD AUTO: 0.23 THOUSAND/ÂΜL (ref 0–0.61)
EOSINOPHIL NFR BLD AUTO: 4 % (ref 0–6)
ERYTHROCYTE [DISTWIDTH] IN BLOOD BY AUTOMATED COUNT: 14.6 % (ref 11.6–15.1)
GFR SERPL CREATININE-BSD FRML MDRD: 95 ML/MIN/1.73SQ M
GLUCOSE SERPL-MCNC: 123 MG/DL (ref 65–140)
GLUCOSE SERPL-MCNC: 133 MG/DL (ref 65–140)
GLUCOSE SERPL-MCNC: 159 MG/DL (ref 65–140)
GLUCOSE SERPL-MCNC: 220 MG/DL (ref 65–140)
GLUCOSE SERPL-MCNC: 82 MG/DL (ref 65–140)
HCT VFR BLD AUTO: 36.4 % (ref 36.5–49.3)
HGB BLD-MCNC: 12.2 G/DL (ref 12–17)
IMM GRANULOCYTES # BLD AUTO: 0.02 THOUSAND/UL (ref 0–0.2)
IMM GRANULOCYTES NFR BLD AUTO: 0 % (ref 0–2)
LYMPHOCYTES # BLD AUTO: 1.14 THOUSANDS/ÂΜL (ref 0.6–4.47)
LYMPHOCYTES NFR BLD AUTO: 18 % (ref 14–44)
MAGNESIUM SERPL-MCNC: 1.7 MG/DL (ref 1.9–2.7)
MCH RBC QN AUTO: 32.6 PG (ref 26.8–34.3)
MCHC RBC AUTO-ENTMCNC: 33.5 G/DL (ref 31.4–37.4)
MCV RBC AUTO: 97 FL (ref 82–98)
MONOCYTES # BLD AUTO: 0.52 THOUSAND/ÂΜL (ref 0.17–1.22)
MONOCYTES NFR BLD AUTO: 8 % (ref 4–12)
NEUTROPHILS # BLD AUTO: 4.33 THOUSANDS/ÂΜL (ref 1.85–7.62)
NEUTS SEG NFR BLD AUTO: 68 % (ref 43–75)
NRBC BLD AUTO-RTO: 0 /100 WBCS
PHOSPHATE SERPL-MCNC: 3 MG/DL (ref 2.3–4.1)
PLATELET # BLD AUTO: 184 THOUSANDS/UL (ref 149–390)
PMV BLD AUTO: 9.5 FL (ref 8.9–12.7)
POTASSIUM SERPL-SCNC: 4 MMOL/L (ref 3.5–5.3)
PROT SERPL-MCNC: 6.6 G/DL (ref 6.4–8.4)
RBC # BLD AUTO: 3.74 MILLION/UL (ref 3.88–5.62)
SODIUM SERPL-SCNC: 137 MMOL/L (ref 135–147)
WBC # BLD AUTO: 6.35 THOUSAND/UL (ref 4.31–10.16)

## 2024-02-11 PROCEDURE — 97163 PT EVAL HIGH COMPLEX 45 MIN: CPT | Performed by: PHYSICAL THERAPIST

## 2024-02-11 PROCEDURE — 82948 REAGENT STRIP/BLOOD GLUCOSE: CPT

## 2024-02-11 PROCEDURE — 99232 SBSQ HOSP IP/OBS MODERATE 35: CPT | Performed by: FAMILY MEDICINE

## 2024-02-11 PROCEDURE — 85025 COMPLETE CBC W/AUTO DIFF WBC: CPT

## 2024-02-11 PROCEDURE — 84100 ASSAY OF PHOSPHORUS: CPT

## 2024-02-11 PROCEDURE — 80053 COMPREHEN METABOLIC PANEL: CPT

## 2024-02-11 PROCEDURE — 83735 ASSAY OF MAGNESIUM: CPT

## 2024-02-11 RX ORDER — MAGNESIUM SULFATE HEPTAHYDRATE 40 MG/ML
4 INJECTION, SOLUTION INTRAVENOUS ONCE
Qty: 100 ML | Refills: 0 | Status: COMPLETED | OUTPATIENT
Start: 2024-02-11 | End: 2024-02-11

## 2024-02-11 RX ORDER — LORAZEPAM 1 MG/1
2 TABLET ORAL ONCE
Status: COMPLETED | OUTPATIENT
Start: 2024-02-11 | End: 2024-02-11

## 2024-02-11 RX ADMIN — FLUTICASONE FUROATE AND VILANTEROL TRIFENATATE 1 PUFF: 200; 25 POWDER RESPIRATORY (INHALATION) at 08:54

## 2024-02-11 RX ADMIN — PANTOPRAZOLE SODIUM 40 MG: 40 TABLET, DELAYED RELEASE ORAL at 21:03

## 2024-02-11 RX ADMIN — APIXABAN 5 MG: 5 TABLET, FILM COATED ORAL at 18:03

## 2024-02-11 RX ADMIN — INSULIN LISPRO 2 UNITS: 100 INJECTION, SOLUTION INTRAVENOUS; SUBCUTANEOUS at 21:04

## 2024-02-11 RX ADMIN — GABAPENTIN 300 MG: 300 CAPSULE ORAL at 08:53

## 2024-02-11 RX ADMIN — GABAPENTIN 300 MG: 300 CAPSULE ORAL at 21:03

## 2024-02-11 RX ADMIN — PANTOPRAZOLE SODIUM 40 MG: 40 TABLET, DELAYED RELEASE ORAL at 08:53

## 2024-02-11 RX ADMIN — LORAZEPAM 2 MG: 1 TABLET ORAL at 09:07

## 2024-02-11 RX ADMIN — MAGNESIUM SULFATE 4 G: 4 INJECTION INTRAVENOUS at 12:28

## 2024-02-11 RX ADMIN — LORAZEPAM 2 MG: 1 TABLET ORAL at 21:03

## 2024-02-11 RX ADMIN — METOPROLOL TARTRATE 25 MG: 25 TABLET, FILM COATED ORAL at 08:53

## 2024-02-11 RX ADMIN — APIXABAN 5 MG: 5 TABLET, FILM COATED ORAL at 08:53

## 2024-02-11 RX ADMIN — METOPROLOL TARTRATE 25 MG: 25 TABLET, FILM COATED ORAL at 21:03

## 2024-02-11 RX ADMIN — ALBUTEROL SULFATE 2 PUFF: 90 AEROSOL, METERED RESPIRATORY (INHALATION) at 08:54

## 2024-02-11 RX ADMIN — LORAZEPAM 2 MG: 1 TABLET ORAL at 12:52

## 2024-02-11 RX ADMIN — ASPIRIN 81 MG CHEWABLE TABLET 81 MG: 81 TABLET CHEWABLE at 08:53

## 2024-02-11 RX ADMIN — GABAPENTIN 300 MG: 300 CAPSULE ORAL at 18:03

## 2024-02-11 RX ADMIN — INSULIN LISPRO 1 UNITS: 100 INJECTION, SOLUTION INTRAVENOUS; SUBCUTANEOUS at 08:52

## 2024-02-11 RX ADMIN — FOLIC ACID 1 MG: 1 TABLET ORAL at 08:53

## 2024-02-11 RX ADMIN — RANOLAZINE 500 MG: 500 TABLET, FILM COATED, EXTENDED RELEASE ORAL at 21:03

## 2024-02-11 RX ADMIN — NICOTINE 1 PATCH: 21 PATCH, EXTENDED RELEASE TRANSDERMAL at 08:56

## 2024-02-11 RX ADMIN — Medication 1 TABLET: at 08:53

## 2024-02-11 RX ADMIN — LISINOPRIL 10 MG: 10 TABLET ORAL at 08:53

## 2024-02-11 RX ADMIN — RANOLAZINE 500 MG: 500 TABLET, FILM COATED, EXTENDED RELEASE ORAL at 09:01

## 2024-02-11 RX ADMIN — TAMSULOSIN HYDROCHLORIDE 0.4 MG: 0.4 CAPSULE ORAL at 08:53

## 2024-02-11 RX ADMIN — FERROUS SULFATE TAB 325 MG (65 MG ELEMENTAL FE) 325 MG: 325 (65 FE) TAB at 08:53

## 2024-02-11 RX ADMIN — PRAVASTATIN SODIUM 40 MG: 40 TABLET ORAL at 18:03

## 2024-02-11 NOTE — PHYSICAL THERAPY NOTE
"PT EVALUATION     Time In: 0920  Time Out: 0930 02/11/24 0920   PT Last Visit   PT Visit Date 02/11/24   Note Type   Note type Evaluation   Pain Assessment   Pain Assessment Tool 0-10   Pain Score 5   Pain Location/Orientation Location: Head   Hospital Pain Intervention(s) Repositioned;Ambulation/increased activity;Emotional support   Restrictions/Precautions   Weight Bearing Precautions Per Order No   Other Precautions Fall Risk;Pain;Chair Alarm;Bed Alarm   Home Living   Type of Home Apartment   Home Layout Able to live on main level with bedroom/bathroom;Elevator   Bathroom Shower/Tub Tub/shower unit   Bathroom Toilet Standard   Bathroom Accessibility Accessible   Home Equipment Walker  (\"I have the walker, I don't use it all the time.\")   Prior Function   Level of [place] Independent with ADLs;Independent with functional mobility   Lives With Alone   Receives Help From Family   Falls in the last 6 months 1 to 4   General   Additional Pertinent History Per H&P upon admission, \"Pt c/o chest pain this am following 1 week of drinking. Pt has Hx cardiac stents and bypass approximately 20 years ago. Pt non-compliant with medications or medical care.   Family/Caregiver Present No   Cognition   Overall Cognitive Status WFL   Arousal/Participation Alert   Orientation Level Oriented X4   Memory Within functional limits   Following Commands Follows one step commands without difficulty   Subjective   Subjective \"I got a headache and my stomach hurts a little.\"   RLE Assessment   RLE Assessment   (4/5 all myotomes tested in sitting)   LLE Assessment   LLE Assessment   (4/5 all myotomes tested in sitting)   Bed Mobility   Supine to Sit 5  Supervision   Additional items HOB elevated;Bedrails   Sit to Supine 5  Supervision   Additional items HOB elevated;Bedrails   Transfers   Sit to Stand 5  Supervision   Stand to Sit 5  Supervision   Ambulation/Elevation   Gait pattern Shuffling;Short stride   Gait Assistance 4  " Minimal assist   Additional items Assist x 1   Assistive Device   (PT hand hold)   Distance 3 feet sidestepping to [facility]   Stair Management Assistance Not tested   Balance   Static Sitting Fair   Dynamic Sitting Fair   Static Standing Fair -   Dynamic Standing Poor +   Ambulatory Poor +   Activity Tolerance   Activity Tolerance Patient limited by pain   Nurse Made Aware CHRISTIE Swift   Assessment   Problem List Decreased strength;Decreased range of motion;Decreased endurance;Impaired balance;Decreased mobility;Pain   Assessment Patient seen for Physical Therapy evaluation. Patient admitted with Alcohol abuse with withdrawal (HCC).  Comorbidities affecting patient's physical performance include: Afib, CAD, cancer, CHF, CKD, COPD, diabetes, GERD, and MI.  Personal factors affecting patient at time of initial evaluation include: ambulating with assistive device, inability to ambulate household distances, inability to navigate community distances, limited home support, positive fall history, inability to perform ADLS, and inability to perform IADLS . Prior to admission, patient was independent with functional mobility with RW (used at times), independent with ADLS, independent with IADLS, ambulating household distance, and on disability.  Please find objective findings from Physical Therapy assessment regarding body systems outlined above with impairments and limitations including weakness, decreased ROM, impaired balance, decreased endurance, gait deviations, pain, decreased activity tolerance, decreased functional mobility tolerance, and fall risk.  The Barthel Index was used as a functional outcome tool presenting with a score of Barthel Index Score: 40 today indicating moderate limitations of functional mobility and ADLS.  Patient's clinical presentation is currently evolving as seen in patient's presentation of changing level of pain, increased fall risk, new onset of impairment of functional mobility, and decreased  "endurance. Pt would benefit from continued Physical Therapy treatment to address deficits as defined above and maximize level of functional mobility. As demonstrated by objective findings, the assigned level of complexity for this evaluation is high.The patient's -[facility] Basic Mobility Inpatient Short Form Raw Score is 16. A Raw score of less than or equal to 16 suggests the patient may benefit from discharge to post-acute rehabilitation services. Please also refer to the recommendation of the Physical Therapist for safe discharge planning.   Goals   STG Expiration Date 02/18/24   Short Term Goal #1 Short-term goals: Pt will improve strength by 1/2 grade in bilateral LE;  Pt will improve bed mobility to mod I in order to improve function; Standing balance will improve to \"fair\" or better in order to decrease risk of falls ;  Pt will ambulate with RW 50 feet with min A x 1 assist;   LTG Expiration Date 02/25/24   Long Term Goal #1 Long-term goals: Patient will improve all transfers to mod I demonstrating safe and appropriate technique in order to improve ability to negotiate safely in home environment;  Patient will be able to ambulate with least restrictive AD > 200'x1 with supervision in order to demonstrate ability to negotiate in home environment; Patient will negotiate stairs using most appropriate technique and S in order to be able to negotiate safely in home environment; Patient will improve AMPAC score to 19/24 or better to demonstrate improved functional independence.   Plan   Treatment/Interventions ADL retraining;Functional transfer training;LE strengthening/ROM;Therapeutic exercise;Endurance training;Patient/family training;Bed mobility;Gait training;Spoke to nursing   PT Frequency 3-5x/wk   Discharge Recommendation   Rehab Resource Intensity Level, PT III (Minimum Resource Intensity)   AM-PAC Basic Mobility Inpatient   Turning in Flat Bed Without Bedrails 4   Lying on Back to Sitting on Edge of Flat Bed " Without Bedrails 3   Moving Bed to Chair 3   Standing Up From Chair Using Arms 3   Walk in Room 2   Climb 3-5 Stairs With Railing 1   Basic Mobility Inpatient Raw Score 16   Basic Mobility Standardized Score 38.32   Highest Level Of Mobility   -[facility] Goal 5: Stand one or more mins   -HLM Achieved 5: Stand (1 or more minutes)   Barthel Index   Feeding 10   Bathing 0   Grooming Score 0   Dressing Score 5   Bladder Score 0   Bowels Score 10   Toilet Use Score 5   Transfers (Bed/Chair) Score 10   Mobility (Level Surface) Score 0   Stairs Score 0   Barthel Index Score 40   End of Consult   Patient Position at End of Consult Supine;All needs within reach   Licensure   [place] License Number  Natividad Bennett PT, DPT [id number]

## 2024-02-11 NOTE — PROGRESS NOTES
"Daily Progress Note - [facility]  Family Medicine Residency  Gregg Partida 63 y.o. male MRN: [id number]  Unit/Bed#: [address] [place] [address] Encounter: [id number]  Admitting Physician: Brandy Connors DO   PCP: Korin Lo MD  Date of Admission:  2/10/2024  5:41 AM    Assessment and Plan    * Alcohol abuse with withdrawal (HCC)  Assessment & Plan  Chronic. Home meds: Folate, Thiamine, Naltrexone      Patient reports drinking heavily \"bottles of vodka\" since the last 10 days  Last drink reported 2/9 night. Ethanol level >400, AST 88, ALT 67  CIWA protocol = 12 on admission      Plan:   Continue home Folate, Thiamine  CIWA protocol  Hold naltrexone  Monitor electrolytes     Paroxysmal atrial fibrillation (HCC)  Assessment & Plan  Pt presented to ER with weakness, tachycardia and palpitations secondary to alcohol abuse. On aspirin. Pt has not taken home Lopressor since one week. Not taking Eliquis since last admission. EKG on admission showed Afib with RVR; likely due to medication non-compliance. Given metoprolol 5 mg IV in the ER and converted to sinus.   - Most likely secondary to alcohol withdrawal/medication non-compliance  - [id number] stroke risk score 3 points      Plan  CIWA protocol for alcohol withdrawal which is likely  of frequent Afib   Continue aspirin 81 mg and Lopressor 25 mg BID  Restart Eliquis   Monitor on telemetry    Type 2 MI (myocardial infarction) (HCC)  Assessment & Plan  On admission, Troponin level elevated at 82>70>69 trending down  Denies chest pain, nausea, vomiting, diaphoresis. Inferior ST segment depressions on EKG     Non MI troponin elevation in setting of Afib RVR   Continue home Aspirin 81 mg   Cardiology consult not indicated at this time as patient spontaneously reverted back to sinus rhythm, remains sinus, troponin trended down   Closely monitor and continue 48 hour telemetry     GERD (gastroesophageal reflux disease)  Assessment & Plan  Chronic, " stable  Continue home protonix     Stage 3a chronic kidney disease (HCC)  Assessment & Plan  Lab Results   Component Value Date    EGFR 95 02/11/2024    EGFR 77 02/10/2024    EGFR 71 01/30/2024    CREATININE 0.81 02/11/2024    CREATININE 1.04 02/10/2024    CREATININE 1.11 01/30/2024     Stable, at baseline Cr of 1.0-1.1  Recommend follow-up outpatient with nephrologist    Fatty liver, alcoholic  Assessment & Plan  Hepatic steatosis on CT A/P 11/30  Mild elevation in LFTs, repeat CMP in AM  Encourage abstinence from alcohol    Prostate cancer (HCC)  Assessment & Plan  Hx prostate cancer status postradiation  Continue home Flomax    Chronic heart failure with preserved ejection fraction (HCC)  Assessment & Plan  Wt Readings from Last 3 Encounters:   02/11/24 85.3 kg (188 lb)   01/27/24 88.5 kg (195 lb 1.7 oz)   12/14/23 96.2 kg (212 lb)     Echo 9/2023: EF 62%, G2DD  Does not appear to be in acute exacerbation     Continue home metoprolol and lisinopril  Strict I&Os  Daily weights    CAD (coronary artery disease)  Assessment & Plan  Patient with hx of CAD s/p CABG. Not taking Eliquis since last admission.  Home meds: Aspirin 81 mg, Eliquis 5 mg BID (was not taking), Pravastatin 40 mg, Ranolazine 500mg     Plan  Continue with aspirin, metoprolol, statin, ranolzine  Restart Eliquis    Hx of CABG  Assessment & Plan  Troponin level elevated at 82>70> 69 but trending down  Denies chest pain, nausea, vomiting, diaphoresis. Inferior ST depressions on EKG     Likely Non MI troponin elevation in setting of Afib RVR and acute alcohol intoxication/withdrawals   48 hour telemetry  Continue home Aspirin 81 mg    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 disease (HCC)  Assessment & Plan  Lab Results   Component Value Date    HGBA1C 5.4 01/24/2024     Recent Labs     02/10/24  1503 02/10/24  1553 02/10/24  2019 02/11/24  0714   POCGLU 119 105 117 159*     Blood Sugar Average: Last 72 hrs:  (P) 125    Hold metformin  ISS  and Glucose accuchecks ACHS   Regular diet since most recent A1c was 5.4, blood glucose stable in hospital, and patient has accuchecks ACHS   Advised patient that blood glucose levels still need to be monitored and will adjust diet accordingly    Continue with gabapentin 300 mg TID daily for diabetic neuropathy     COPD (chronic obstructive pulmonary disease) ([facility])  Assessment & Plan  Patient has some shortness of breath and cough at baseline. He is a chronic smoker, 1.5 PPD. There are no PFTs or pulm notes on file. Does not appear to be in acute exacerbation.     Plan  Continue home inhalers Breo Ellipta  PRN duonebs        VTE Pharmacologic Prophylaxis: VTE Score: 5 High Risk (Score >/= 5) - Pharmacological DVT Prophylaxis Ordered: apixaban (Eliquis). Sequential Compression Devices Ordered.    Patient Centered Rounds: I have performed bedside rounds with nursing staff today.    Time Spent for Care: 20 minutes.  More than 50% of total time spent on counseling and coordination of care as described above.    Current Length of Stay: 1 day(s)    Current Patient Status: Inpatient   Certification Statement: The patient will continue to require additional inpatient hospital stay due to acute alcohol intoxication and withdrawals     Code Status: Level 1 - Full Code    Subjective:   Patient was seen and examined at bedside. He was reporting a mild headache, abdominal pain, nausea but no vomiting, and tremors. He states that his tremors seem to have mildly increased since yesterday.   He states that he is feeling hungry and would like a regular diet instead of a diabetic diet so he can have a little more food. He currently denies chest pain, palpitations, seizures, vomiting, dizziness.       Objective     Objective:   Vitals:   Temp (24hrs), Av.9 °F (36.6 °C), Min:97.3 °F (36.3 °C), Max:98.5 °F (36.9 °C)    Temp:  [97.3 °F (36.3 °C)-98.5 °F (36.9 °C)] 97.5 °F (36.4 °C)  HR:  [] 99  Resp:  [18-20] 18  BP:  ()/() 132/73  SpO2:  [93 %-99 %] 98 %  Body mass index is 24.14 kg/m².     Input and Output Summary (last 24 hours):       Intake/Output Summary (Last 24 hours) at 2/11/2024 1309  Last data filed at 2/11/2024 0913  Gross per 24 hour   Intake 900 ml   Output 2100 ml   Net -1200 ml       Physical Exam:   Physical Exam  Constitutional:       General: He is in acute distress.      Appearance: He is normal weight. He is ill-appearing.   HENT:      Head: Normocephalic.   Eyes:      General: No scleral icterus.     Extraocular Movements: Extraocular movements intact.      Conjunctiva/sclera: Conjunctivae normal.   Cardiovascular:      Rate and Rhythm: Normal rate and regular rhythm.      Heart sounds: Normal heart sounds. No murmur heard.  Pulmonary:      Effort: Pulmonary effort is normal. No respiratory distress.      Breath sounds: Normal breath sounds.   Abdominal:      General: Bowel sounds are normal.      Palpations: Abdomen is soft.      Tenderness: There is abdominal tenderness (to palpation).   Musculoskeletal:      Right lower leg: No edema.      Left lower leg: No edema.   Skin:     General: Skin is warm.      Findings: Bruising (multiple locations, right arm, back) present.   Neurological:      Mental Status: He is alert and oriented to person, place, and time.      Motor: Tremor present. No seizure activity.   Psychiatric:         Attention and Perception: Attention normal.         Behavior: Behavior is cooperative.         Additional Data:     Labs:  Results from last 7 days   Lab Units 02/11/24  0633   WBC Thousand/uL 6.35   HEMOGLOBIN g/dL 12.2   HEMATOCRIT % 36.4*   PLATELETS Thousands/uL 184   NEUTROS PCT % 68   LYMPHS PCT % 18   MONOS PCT % 8   EOS PCT % 4     Results from last 7 days   Lab Units 02/11/24  0633   POTASSIUM mmol/L 4.0   CHLORIDE mmol/L 102   CO2 mmol/L 27   BUN mg/dL 14   CREATININE mg/dL 0.81   CALCIUM mg/dL 8.2*   ALK PHOS U/L 73   ALT U/L 47   AST U/L 53*     Results from  last 7 days   Lab Units 02/10/24  0545   INR  0.94     Results from last 7 days   Lab Units 02/11/24  1141 02/11/24  0714 02/10/24  2019 02/10/24  1553 02/10/24  1503   POC GLUCOSE mg/dl 133 159* 117 105 119           * I Have Reviewed All Lab Data Listed Above.  * Additional Pertinent Lab Tests Reviewed: All Labs For Current Hospital Admission Reviewed    Imaging:    Imaging Reports Reviewed Today Include:   2/10 CT Chest abdomen pelvis w contrast  2/10 CT Spine cervical wo contrast   2/10 CT Head wo contrast     Recent Cultures (last 7 days):         Last 24 Hours Medication List:   Current Facility-Administered Medications   Medication Dose Route Frequency Provider Last Rate    albuterol  2 puff Inhalation Q4H PRN Doron Reyes MD      apixaban  5 mg Oral BID Doron Reyes MD      aspirin  81 mg Oral Daily Doron Reyes MD      ferrous sulfate  325 mg Oral Daily With Breakfast Doron Reyes MD      fluticasone-vilanterol  1 puff Inhalation Daily Doron Reyes MD      folic acid  1 mg Oral Daily Doron Reyes MD      gabapentin  300 mg Oral TID Doron Reyes MD      insulin lispro  1-6 Units Subcutaneous 4x Daily (AC & HS) Pablito Presley MD      lisinopril  10 mg Oral Daily Doron Reyes MD      magnesium sulfate  4 g Intravenous Once Lois Kitchen, DO 4 g (02/11/24 1228)    metoprolol tartrate  25 mg Oral Q12H SEDA Doron Reyes MD      nicotine  1 patch Transdermal Daily Pablito Presley MD      pantoprazole  40 mg Oral BID Doron Reyes MD      pravastatin  40 mg Oral Daily With Dinner Doron Reyes MD      ranolazine  500 mg Oral Q12H Doron Reyes MD      senna-docusate sodium  1 tablet Oral Daily PRN Doron Reyes MD      tamsulosin  0.4 mg Oral Daily Doron Reyes MD               ** Please Note: Dictation voice to text software may have been used in the creation of this document. **    Lois Kitchen, DO  02/11/24  1:09 PM

## 2024-02-11 NOTE — PLAN OF CARE
Problem: PHYSICAL THERAPY ADULT  Goal: Performs mobility at highest level of function for planned discharge setting.  See evaluation for individualized goals.  Description: Treatment/Interventions: ADL retraining, Functional transfer training, LE strengthening/ROM, Therapeutic exercise, Endurance training, Patient/family training, Bed mobility, Gait training, Spoke to nursing          See flowsheet documentation for full assessment, interventions and recommendations.  Note:    Problem List: Decreased strength, Decreased range of motion, Decreased endurance, Impaired balance, Decreased mobility, Pain  Assessment: Patient seen for Physical Therapy evaluation. Patient admitted with Alcohol abuse with withdrawal (HCC).  Comorbidities affecting patient's physical performance include: Afib, CAD, cancer, CHF, CKD, COPD, diabetes, GERD, and MI.  Personal factors affecting patient at time of initial evaluation include: ambulating with assistive device, inability to ambulate household distances, inability to navigate community distances, limited home support, positive fall history, inability to perform ADLS, and inability to perform IADLS . Prior to admission, patient was independent with functional mobility with RW (used at times), independent with ADLS, independent with IADLS, ambulating household distance, and on disability.  Please find objective findings from Physical Therapy assessment regarding body systems outlined above with impairments and limitations including weakness, decreased ROM, impaired balance, decreased endurance, gait deviations, pain, decreased activity tolerance, decreased functional mobility tolerance, and fall risk.  The Barthel Index was used as a functional outcome tool presenting with a score of Barthel Index Score: 40 today indicating moderate limitations of functional mobility and ADLS.  Patient's clinical presentation is currently evolving as seen in patient's presentation of changing level of  pain, increased fall risk, new onset of impairment of functional mobility, and decreased endurance. Pt would benefit from continued Physical Therapy treatment to address deficits as defined above and maximize level of functional mobility. As demonstrated by objective findings, the assigned level of complexity for this evaluation is high.The patient's AM-[facility] Basic Mobility Inpatient Short Form Raw Score is 16. A Raw score of less than or equal to 16 suggests the patient may benefit from discharge to post-acute rehabilitation services. Please also refer to the recommendation of the Physical Therapist for safe discharge planning.        Rehab Resource Intensity Level, PT: III (Minimum Resource Intensity)    See flowsheet documentation for full assessment.

## 2024-02-11 NOTE — PHYSICAL THERAPY NOTE
"PT EVALUATION     Time In: 0920  Time Out: 0930 02/11/24 0920   PT Last Visit   PT Visit Date 02/11/24   Note Type   Note type Evaluation   Pain Assessment   Pain Assessment Tool 0-10   Pain Score 5   Pain Location/Orientation Location: Head   Hospital Pain Intervention(s) Repositioned;Ambulation/increased activity;Emotional support   Restrictions/Precautions   Weight Bearing Precautions Per Order No   Other Precautions Fall Risk;Pain;Chair Alarm;Bed Alarm   Home Living   Type of Home Apartment   Home Layout Able to live on main level with bedroom/bathroom;Elevator   Bathroom Shower/Tub Tub/shower unit   Bathroom Toilet Standard   Bathroom Accessibility Accessible   Home Equipment Walker  (\"I have the walker, I don't use it all the time.\")   Prior Function   Level of [place] Independent with ADLs;Independent with functional mobility   Lives With Alone   Receives Help From Family   Falls in the last 6 months 1 to 4   General   Additional Pertinent History Per H&P upon admission, \"Pt c/o chest pain this am following 1 week of drinking. Pt has Hx cardiac stents and bypass approximately 20 years ago. Pt non-compliant with medications or medical care.   Family/Caregiver Present No   Cognition   Overall Cognitive Status WFL   Arousal/Participation Alert   Orientation Level Oriented X4   Memory Within functional limits   Following Commands Follows one step commands without difficulty   Subjective   Subjective \"I got a headache and my stomach hurts a little.\"   RLE Assessment   RLE Assessment   (4/5 all myotomes tested in sitting)   LLE Assessment   LLE Assessment   (4/5 all myotomes tested in sitting)   Bed Mobility   Supine to Sit 5  Supervision   Additional items HOB elevated;Bedrails   Sit to Supine 5  Supervision   Additional items HOB elevated;Bedrails   Transfers   Sit to Stand 5  Supervision   Stand to Sit 5  Supervision   Ambulation/Elevation   Gait pattern Shuffling;Short stride   Gait Assistance 4  " Minimal assist   Additional items Assist x 1   Assistive Device   (PT hand hold)   Distance 3 feet sidestepping to [facility]   Stair Management Assistance Not tested   Balance   Static Sitting Fair   Dynamic Sitting Fair   Static Standing Fair -   Dynamic Standing Poor +   Ambulatory Poor +   Activity Tolerance   Activity Tolerance Patient limited by pain   Nurse Made Aware CHRISTIE Swift   Assessment   Problem List Decreased strength;Decreased range of motion;Decreased endurance;Impaired balance;Decreased mobility;Pain   Assessment Patient seen for Physical Therapy evaluation. Patient admitted with Alcohol abuse with withdrawal (HCC).  Comorbidities affecting patient's physical performance include: Afib, CAD, cancer, CHF, CKD, COPD, diabetes, GERD, and MI.  Personal factors affecting patient at time of initial evaluation include: ambulating with assistive device, inability to ambulate household distances, inability to navigate community distances, limited home support, positive fall history, inability to perform ADLS, and inability to perform IADLS . Prior to admission, patient was independent with functional mobility with RW (used at times), independent with ADLS, independent with IADLS, ambulating household distance, and on disability.  Please find objective findings from Physical Therapy assessment regarding body systems outlined above with impairments and limitations including weakness, decreased ROM, impaired balance, decreased endurance, gait deviations, pain, decreased activity tolerance, decreased functional mobility tolerance, and fall risk.  The Barthel Index was used as a functional outcome tool presenting with a score of Barthel Index Score: 40 today indicating moderate limitations of functional mobility and ADLS.  Patient's clinical presentation is currently evolving as seen in patient's presentation of changing level of pain, increased fall risk, new onset of impairment of functional mobility, and decreased  "endurance. Pt would benefit from continued Physical Therapy treatment to address deficits as defined above and maximize level of functional mobility. As demonstrated by objective findings, the assigned level of complexity for this evaluation is high.The patient's -[facility] Basic Mobility Inpatient Short Form Raw Score is 16. A Raw score of less than or equal to 16 suggests the patient may benefit from discharge to post-acute rehabilitation services. Please also refer to the recommendation of the Physical Therapist for safe discharge planning.   Goals   STG Expiration Date 02/18/24   Short Term Goal #1 Short-term goals: Pt will improve strength by 1/2 grade in bilateral LE;  Pt will improve bed mobility to mod I in order to improve function; Standing balance will improve to \"fair\" or better in order to decrease risk of falls ;  Pt will ambulate with RW 50 feet with min A x 1 assist;   LTG Expiration Date 02/25/24   Long Term Goal #1 Long-term goals: Patient will improve all transfers to mod I demonstrating safe and appropriate technique in order to improve ability to negotiate safely in home environment;  Patient will be able to ambulate with least restrictive AD > 200'x1 with supervision in order to demonstrate ability to negotiate in home environment; Patient will negotiate stairs using most appropriate technique and S in order to be able to negotiate safely in home environment; Patient will improve AMPAC score to 19/24 or better to demonstrate improved functional independence.   Plan   Treatment/Interventions ADL retraining;Functional transfer training;LE strengthening/ROM;Therapeutic exercise;Endurance training;Patient/family training;Bed mobility;Gait training;Spoke to nursing   PT Frequency 3-5x/wk   Discharge Recommendation   Rehab Resource Intensity Level, PT III (Minimum Resource Intensity)   AM-PAC Basic Mobility Inpatient   Turning in Flat Bed Without Bedrails 4   Lying on Back to Sitting on Edge of Flat Bed " Without Bedrails 3   Moving Bed to Chair 3   Standing Up From Chair Using Arms 3   Walk in Room 2   Climb 3-5 Stairs With Railing 1   Basic Mobility Inpatient Raw Score 16   Basic Mobility Standardized Score 38.32   Highest Level Of Mobility   -[facility] Goal 5: Stand one or more mins   -[facility] Achieved 5: Stand (1 or more minutes)   Barthel Index   Feeding 10   Bathing 0   Grooming Score 0   Dressing Score 5   Bladder Score 0   Bowels Score 10   Toilet Use Score 5   Transfers (Bed/Chair) Score 10   Mobility (Level Surface) Score 0   Stairs Score 0   Barthel Index Score 40

## 2024-02-11 NOTE — TELEPHONE ENCOUNTER
----- Message from Sarbjit Clemons DO sent at 2/9/2022  1:47 PM EST -----  Hi,    Can we please reach out to this patient? He was supposed to have an appointment with me yesterday to have stitches removed and he was a no show       Dr Marvin Moraes ANTEPARTUM DISCHARGE SUMMARY:    DISPOSITION STATUS NOTE:  Date: 2/11/24  Mode: ambulatory Accompanied by:significant other  Discharge of Patient: home  Discharged in stable condition and undelivered.    Pamphlet / Instructions given:  Attached sheets    Activity:  Activity as tolerated.    Diet:  General Diet  Drink 8-10 glasses of water a day  Don't go more than 8-10 hours without eating or drinking    Warning signs of pregnancy:   Sudden and/or constant pain  Vaginal bleeding  Sudden gush or leaking fluid from the vagina  Fainting  Severe nausea and vomiting  Blurry vision or spots /  Stars before eyes  Pain and burning with urination  Chills or fever  Your baby moves less than usual  Increase or change in vaginal discharge          Appointment(s) to be kept:  With Dr. Sabillon          A copy of this form was provided to and reviewed with the patient/responsible person by: ESVIN Zaman.  Date: 2/11/24

## 2024-02-12 LAB
ALBUMIN SERPL BCP-MCNC: 3.1 G/DL (ref 3.5–5)
ALP SERPL-CCNC: 62 U/L (ref 34–104)
ALT SERPL W P-5'-P-CCNC: 30 U/L (ref 7–52)
ANION GAP SERPL CALCULATED.3IONS-SCNC: 4 MMOL/L
AST SERPL W P-5'-P-CCNC: 26 U/L (ref 13–39)
ATRIAL RATE: 100 BPM
ATRIAL RATE: 90 BPM
BASOPHILS # BLD AUTO: 0.07 THOUSANDS/ÂΜL (ref 0–0.1)
BASOPHILS NFR BLD AUTO: 1 % (ref 0–1)
BILIRUB SERPL-MCNC: 0.35 MG/DL (ref 0.2–1)
BUN SERPL-MCNC: 15 MG/DL (ref 5–25)
CALCIUM ALBUM COR SERPL-MCNC: 8.7 MG/DL (ref 8.3–10.1)
CALCIUM SERPL-MCNC: 8 MG/DL (ref 8.4–10.2)
CHLORIDE SERPL-SCNC: 100 MMOL/L (ref 96–108)
CO2 SERPL-SCNC: 28 MMOL/L (ref 21–32)
CREAT SERPL-MCNC: 0.92 MG/DL (ref 0.6–1.3)
EOSINOPHIL # BLD AUTO: 0.35 THOUSAND/ÂΜL (ref 0–0.61)
EOSINOPHIL NFR BLD AUTO: 5 % (ref 0–6)
ERYTHROCYTE [DISTWIDTH] IN BLOOD BY AUTOMATED COUNT: 13.9 % (ref 11.6–15.1)
GFR SERPL CREATININE-BSD FRML MDRD: 89 ML/MIN/1.73SQ M
GLUCOSE SERPL-MCNC: 126 MG/DL (ref 65–140)
GLUCOSE SERPL-MCNC: 135 MG/DL (ref 65–140)
GLUCOSE SERPL-MCNC: 142 MG/DL (ref 65–140)
GLUCOSE SERPL-MCNC: 154 MG/DL (ref 65–140)
HCT VFR BLD AUTO: 32.3 % (ref 36.5–49.3)
HGB BLD-MCNC: 10.7 G/DL (ref 12–17)
IMM GRANULOCYTES # BLD AUTO: 0.03 THOUSAND/UL (ref 0–0.2)
IMM GRANULOCYTES NFR BLD AUTO: 1 % (ref 0–2)
LYMPHOCYTES # BLD AUTO: 1.01 THOUSANDS/ÂΜL (ref 0.6–4.47)
LYMPHOCYTES NFR BLD AUTO: 16 % (ref 14–44)
MAGNESIUM SERPL-MCNC: 1.5 MG/DL (ref 1.9–2.7)
MCH RBC QN AUTO: 32.4 PG (ref 26.8–34.3)
MCHC RBC AUTO-ENTMCNC: 33.1 G/DL (ref 31.4–37.4)
MCV RBC AUTO: 98 FL (ref 82–98)
MONOCYTES # BLD AUTO: 0.44 THOUSAND/ÂΜL (ref 0.17–1.22)
MONOCYTES NFR BLD AUTO: 7 % (ref 4–12)
NEUTROPHILS # BLD AUTO: 4.57 THOUSANDS/ÂΜL (ref 1.85–7.62)
NEUTS SEG NFR BLD AUTO: 70 % (ref 43–75)
NRBC BLD AUTO-RTO: 0 /100 WBCS
P AXIS: 70 DEGREES
P AXIS: 77 DEGREES
PLATELET # BLD AUTO: 129 THOUSANDS/UL (ref 149–390)
PMV BLD AUTO: 9.6 FL (ref 8.9–12.7)
POTASSIUM SERPL-SCNC: 4.2 MMOL/L (ref 3.5–5.3)
PR INTERVAL: 124 MS
PR INTERVAL: 126 MS
PROT SERPL-MCNC: 5.8 G/DL (ref 6.4–8.4)
QRS AXIS: 84 DEGREES
QRS AXIS: 85 DEGREES
QRSD INTERVAL: 84 MS
QRSD INTERVAL: 90 MS
QT INTERVAL: 368 MS
QT INTERVAL: 414 MS
QTC INTERVAL: 474 MS
QTC INTERVAL: 506 MS
RBC # BLD AUTO: 3.3 MILLION/UL (ref 3.88–5.62)
SODIUM SERPL-SCNC: 132 MMOL/L (ref 135–147)
T WAVE AXIS: 86 DEGREES
T WAVE AXIS: 98 DEGREES
VENTRICULAR RATE: 100 BPM
VENTRICULAR RATE: 90 BPM
WBC # BLD AUTO: 6.47 THOUSAND/UL (ref 4.31–10.16)

## 2024-02-12 PROCEDURE — 97166 OT EVAL MOD COMPLEX 45 MIN: CPT

## 2024-02-12 PROCEDURE — 82948 REAGENT STRIP/BLOOD GLUCOSE: CPT

## 2024-02-12 PROCEDURE — 93010 ELECTROCARDIOGRAM REPORT: CPT | Performed by: INTERNAL MEDICINE

## 2024-02-12 PROCEDURE — 83735 ASSAY OF MAGNESIUM: CPT

## 2024-02-12 PROCEDURE — 80053 COMPREHEN METABOLIC PANEL: CPT

## 2024-02-12 PROCEDURE — 85025 COMPLETE CBC W/AUTO DIFF WBC: CPT

## 2024-02-12 PROCEDURE — 99232 SBSQ HOSP IP/OBS MODERATE 35: CPT | Performed by: FAMILY MEDICINE

## 2024-02-12 RX ORDER — DOCUSATE SODIUM 100 MG/1
100 CAPSULE, LIQUID FILLED ORAL DAILY
Status: DISCONTINUED | OUTPATIENT
Start: 2024-02-12 | End: 2024-02-14 | Stop reason: HOSPADM

## 2024-02-12 RX ORDER — MAGNESIUM SULFATE HEPTAHYDRATE 40 MG/ML
4 INJECTION, SOLUTION INTRAVENOUS ONCE
Qty: 100 ML | Refills: 0 | Status: COMPLETED | OUTPATIENT
Start: 2024-02-12 | End: 2024-02-12

## 2024-02-12 RX ORDER — AMOXICILLIN 250 MG
1 CAPSULE ORAL
Status: DISCONTINUED | OUTPATIENT
Start: 2024-02-12 | End: 2024-02-14 | Stop reason: HOSPADM

## 2024-02-12 RX ORDER — LANOLIN ALCOHOL/MO/W.PET/CERES
6 CREAM (GRAM) TOPICAL
Status: DISCONTINUED | OUTPATIENT
Start: 2024-02-12 | End: 2024-02-14 | Stop reason: HOSPADM

## 2024-02-12 RX ORDER — SODIUM CHLORIDE 1 G/1
3 TABLET ORAL 2 TIMES DAILY WITH MEALS
Status: DISCONTINUED | OUTPATIENT
Start: 2024-02-12 | End: 2024-02-14 | Stop reason: HOSPADM

## 2024-02-12 RX ORDER — BISACODYL 5 MG/1
5 TABLET, DELAYED RELEASE ORAL DAILY PRN
Status: DISCONTINUED | OUTPATIENT
Start: 2024-02-12 | End: 2024-02-14 | Stop reason: HOSPADM

## 2024-02-12 RX ADMIN — PANTOPRAZOLE SODIUM 40 MG: 40 TABLET, DELAYED RELEASE ORAL at 21:33

## 2024-02-12 RX ADMIN — FERROUS SULFATE TAB 325 MG (65 MG ELEMENTAL FE) 325 MG: 325 (65 FE) TAB at 08:43

## 2024-02-12 RX ADMIN — FOLIC ACID 1 MG: 1 TABLET ORAL at 08:42

## 2024-02-12 RX ADMIN — METOPROLOL TARTRATE 25 MG: 25 TABLET, FILM COATED ORAL at 08:43

## 2024-02-12 RX ADMIN — SODIUM CHLORIDE 3 G: 1 TABLET ORAL at 18:06

## 2024-02-12 RX ADMIN — METOPROLOL TARTRATE 25 MG: 25 TABLET, FILM COATED ORAL at 21:33

## 2024-02-12 RX ADMIN — Medication 6 MG: at 21:36

## 2024-02-12 RX ADMIN — Medication 6 MG: at 03:23

## 2024-02-12 RX ADMIN — APIXABAN 5 MG: 5 TABLET, FILM COATED ORAL at 18:06

## 2024-02-12 RX ADMIN — GABAPENTIN 300 MG: 300 CAPSULE ORAL at 18:06

## 2024-02-12 RX ADMIN — FLUTICASONE FUROATE AND VILANTEROL TRIFENATATE 1 PUFF: 200; 25 POWDER RESPIRATORY (INHALATION) at 08:44

## 2024-02-12 RX ADMIN — TAMSULOSIN HYDROCHLORIDE 0.4 MG: 0.4 CAPSULE ORAL at 08:42

## 2024-02-12 RX ADMIN — RANOLAZINE 500 MG: 500 TABLET, FILM COATED, EXTENDED RELEASE ORAL at 21:32

## 2024-02-12 RX ADMIN — LISINOPRIL 10 MG: 10 TABLET ORAL at 08:43

## 2024-02-12 RX ADMIN — MAGNESIUM SULFATE 4 G: 4 INJECTION INTRAVENOUS at 09:47

## 2024-02-12 RX ADMIN — GABAPENTIN 300 MG: 300 CAPSULE ORAL at 21:33

## 2024-02-12 RX ADMIN — NICOTINE 1 PATCH: 21 PATCH, EXTENDED RELEASE TRANSDERMAL at 08:44

## 2024-02-12 RX ADMIN — PANTOPRAZOLE SODIUM 40 MG: 40 TABLET, DELAYED RELEASE ORAL at 08:43

## 2024-02-12 RX ADMIN — GABAPENTIN 300 MG: 300 CAPSULE ORAL at 08:43

## 2024-02-12 RX ADMIN — SODIUM CHLORIDE 3 G: 1 TABLET ORAL at 11:13

## 2024-02-12 RX ADMIN — DOCUSATE SODIUM 100 MG: 100 CAPSULE, LIQUID FILLED ORAL at 12:00

## 2024-02-12 RX ADMIN — APIXABAN 5 MG: 5 TABLET, FILM COATED ORAL at 08:43

## 2024-02-12 RX ADMIN — ASPIRIN 81 MG CHEWABLE TABLET 81 MG: 81 TABLET CHEWABLE at 08:43

## 2024-02-12 RX ADMIN — PRAVASTATIN SODIUM 40 MG: 40 TABLET ORAL at 18:06

## 2024-02-12 RX ADMIN — RANOLAZINE 500 MG: 500 TABLET, FILM COATED, EXTENDED RELEASE ORAL at 09:47

## 2024-02-12 NOTE — UTILIZATION REVIEW
Initial Clinical Review    Admission: Date/Time/Statement:   Admission Orders (From admission, onward)       Ordered        02/10/24 0753  INPATIENT ADMISSION  Once                          Orders Placed This Encounter   Procedures    INPATIENT ADMISSION     Standing Status:   Standing     Number of Occurrences:   1     Order Specific Question:   Level of Care     Answer:   Med Surg [16]     Order Specific Question:   Estimated length of stay     Answer:   More than 2 Midnights     Order Specific Question:   Certification     Answer:   I certify that inpatient services are medically necessary for this patient for a duration of greater than two midnights. See H&P and MD Progress Notes for additional information about the patient's course of treatment.     ED Arrival Information       Expected   2/10/2024     Arrival   2/10/2024 05:37    Acuity   Emergent              Means of arrival   Ambulance    Escorted by   [facility]    Admission type   Emergency              Arrival complaint   chest pain             Chief Complaint   Patient presents with    Chest Pain     Pt c/o chest pain this am following 1 week of drinking. Pt has Hx cardiac stents and bypass approximately 20 years ago. Pt non-compliant with medications or medical care.        Initial Presentation:   63 yom to ER from home via EMS for alcohol intoxication/ s/p multiple mechanical falls. Pt daily drinker, @ bottle vodka/day past week, reports last drink yesterday, no meds x 4 days. Hx A-fib on Eliquis (currently not taking), CKD, alcohol dependence, COPD, CAD, HTN, and HLD, smoker @ 1.5 PPD. Recently admitted less than month ago for same & left AMA despite being low on platelets and having abnormal electrolytes. Follow up labs not done. Presents tachycardic, diffusely tremulous, prominent gait disturbance, headache, nausea, multiple bruises R arm & back. Admission work-up showing in & out of afib per ER MD note, resolved with IV  Lopressor, hypomagnesemia, elevated troponin, ETOH 412.  Admitted to inpatient status for alcohol abuse with withdrawal.    Date: 2/11/24   Day 2:   Headache, abd pain, nausea, tremors increased from yesterday. CIWA scores ranging from 3 to 9; requiring Ativan. IG Mg repletion given.    Date: 2/12/24    Day 3: Has surpassed a 2nd midnight with active treatments and services, which include CIWA protocol, monitor for s/s withdrawal, continued telemetry monitoring for PAF, monitor VS, follow labs/lytes.    ED Triage Vitals [02/10/24 0539]   Temperature Pulse Respirations Blood Pressure SpO2   98 °F (36.7 °C) 101 20 146/89 97 %      Temp Source Heart Rate Source Patient Position - Orthostatic VS BP Location FiO2 (%)   Axillary Monitor Lying Left arm --      Pain Score       5          Wt Readings from Last 1 Encounters:   02/12/24 88.5 kg (195 lb)     Additional Vital Signs:   Date/Time Temp Pulse Resp BP MAP (mmHg) SpO2 Calculated FIO2 (%) - Nasal Cannula Nasal Cannula O2 Flow Rate (L/min) O2 Device Patient Position - Orthostatic VS   02/12/24 07:28:45 96 °F (35.6 °C) Abnormal  68 18 149/83 105 96 % -- -- None (Room air) Lying   02/12/24 0547 -- 66 -- 115/57 -- -- -- -- -- --   02/12/24 01:42:39 -- 87 -- 153/85 108 97 % -- -- -- --   02/11/24 22:18:15 98.4 °F (36.9 °C) 84 18 135/69 91 97 % -- -- -- --   02/11/24 2145 -- 84 -- 135/69 -- -- -- -- -- --   02/11/24 20:37:49 97.7 °F (36.5 °C) 85 18 158/91 113 100 % -- -- -- --   02/11/24 1830 97.7 °F (36.5 °C) -- -- -- -- -- -- -- -- --   02/11/24 18:15:21 97.5 °F (36.4 °C) 75 20 162/96 118 97 % -- -- -- --   02/11/24 1745 -- -- -- 160/83 -- -- -- -- -- --   02/11/24 15:11:45 98.1 °F (36.7 °C) 72 18 159/82 108 98 % -- -- -- --   02/11/24 1335 -- -- -- 114/69 -- -- -- -- -- --   02/11/24 1000 -- -- -- 132/73 -- -- -- -- -- --   02/11/24 08:55:58 -- 99 -- 131/69 90 98 % -- -- -- --   02/11/24 0853 -- 95 -- 131/69 -- -- -- -- -- --   02/11/24 07:50:11 97.5 °F (36.4 °C) 63 19  100/58 72 96 % 28 2 L/min Nasal cannula Lying   02/11/24 04:13:37 97.8 °F (36.6 °C) 76 18 143/82 102 97 % -- -- -- --   02/11/24 02:51:12 -- 74 -- 143/83 103 96 % -- -- -- --   02/11/24 0024 -- 82 -- 141/89 -- -- -- -- -- --   02/10/24 23:16:44 -- 83 18 144/84 104 95 % -- -- -- --   02/10/24 22:16:04 98.5 °F (36.9 °C) 89 18 154/84 107 97 % -- -- -- --   02/10/24 20:13:04 98.2 °F (36.8 °C) 91 18 156/83 107 95 % -- -- -- --   02/10/24 18:47:56 -- 80 -- 165/110 Abnormal  128 93 % -- -- -- --   02/10/24 18:15:08 98.1 °F (36.7 °C) 150 Abnormal  18 180/114 Abnormal  136 93 % -- -- -- --   02/10/24 15:36:17 -- 74 18 141/89 106 96 % -- -- -- --   02/10/24 1515 -- 66 18 125/72 93 99 % 28 2 L/min Nasal cannula --   02/10/24 1500 97.3 °F (36.3 °C) Abnormal  66 20 132/73 97 99 % 28 2 L/min Nasal cannula --   02/10/24 1458 -- 66 -- -- -- 99 % -- -- -- --   02/10/24 1456 -- 66 -- 108/59 78 99 % -- -- -- --   02/10/24 1426 -- 70 -- 90/54 66 98 % -- -- -- --   02/10/24 1335 -- 74 -- 96/53 70 96 % -- -- -- --   02/10/24 1326 -- 80 -- 90/53 68 97 % -- -- -- --   02/10/24 1301 -- 96 -- 143/72 99 96 % -- -- -- --   02/10/24 1246 -- 92 -- 142/74 101 94 % -- -- -- --   02/10/24 1231 -- 100 -- 133/67 91 90 % -- -- -- --   02/10/24 1216 -- 90 -- 120/62 84 97 % -- -- -- --   02/10/24 1200 -- 88 17 107/58 77 94 % 28 2 L/min Nasal cannula Lying   02/10/24 1145 -- 76 -- 98/52 70 95 % -- -- -- --   02/10/24 1136 -- 94 18 98/52 -- 93 % -- -- Nasal cannula --   02/10/24 1130 -- 82 -- 86/52 Abnormal  64 Abnormal  92 % -- -- -- --   02/10/24 1115 -- 130 Abnormal  -- 113/62 82 94 % 28 2 L/min  Nasal cannula --   Nasal Cannula O2 Flow Rate (L/min): at sleep at 02/10/24 1115   02/10/24 1045 -- 92 -- 159/80 109 94 % -- -- None (Room air) --   02/10/24 1000 -- 82 -- 160/87 118 93 % -- -- -- --   02/10/24 0945 -- 84 -- 174/84 Abnormal  121 94 % -- -- -- --     2/10 EKG=  Rhythm: sinus    Intervals: normal intervals  Axis: normal axis  QRS/Blocks: normal  QRS  ST Changes: no KAREN, minimal lateral deprssion  T wave changes: anterior precordial    Pertinent Labs/Diagnostic Test Results:   CT chest abdomen pelvis w contrast   Final Result  (02/10 0738)      Chest:      Interval callus formation in the previously fractured posterior right 10th and 11th rib fractures with mild worsening of the fracture displacement suspicious for acute on chronic process if there is supporting clinical findings.      Abdomen and pelvis:      1.  No acute abdominopelvic CT abnormality.   2.  Hepatic steatosis.   3.  Bilateral extrarenal and intrarenal vascular calcification. Tiny right kidney upper pole nonobstructing calculus.   4.  Left greater than right bilateral adrenal nodules are stable from 10/17/2019 . Although its imaging features on this study is indeterminate, because this lesion has been stable for > 1 year, it is considered benign. However, please note that for    adrenal nodule > 1 cm, biochemical evaluation is suggested to rule out functioning adenoma, if not already performed.   5.  Stable multifocal abdominal aortic ectasia.            CT spine cervical without contrast   Final Result  (02/10 0739)      No acute cervical spine fracture or traumatic malalignment.         CT head without contrast   Final Result  (02/10 0739)      No acute intracranial CT abnormality.         Results from last 7 days   Lab Units 02/12/24  0617 02/11/24  0633 02/10/24  0545   WBC Thousand/uL 6.47 6.35 7.07   HEMOGLOBIN g/dL 10.7* 12.2 13.5   HEMATOCRIT % 32.3* 36.4* 38.0   PLATELETS Thousands/uL 129* 184 263   NEUTROS ABS Thousands/µL 4.57 4.33 3.70     Results from last 7 days   Lab Units 02/12/24  0617 02/11/24  0633 02/10/24  0545   SODIUM mmol/L 132* 137 135   POTASSIUM mmol/L 4.2 4.0 4.2   CHLORIDE mmol/L 100 102 94*   CO2 mmol/L 28 27 25   ANION GAP mmol/L 4 8 16   BUN mg/dL 15 14 12   CREATININE mg/dL 0.92 0.81 1.04   EGFR ml/min/1.73sq m 89 95 77   CALCIUM mg/dL 8.0* 8.2* 8.4    MAGNESIUM mg/dL 1.5* 1.7* 1.3*   PHOSPHORUS mg/dL  --  3.0  --      Results from last 7 days   Lab Units 02/12/24  0617 02/11/24  0633 02/10/24  0545   AST U/L 26 53* 88*   ALT U/L 30 47 67*   ALK PHOS U/L 62 73 82   TOTAL PROTEIN g/dL 5.8* 6.6 7.5   ALBUMIN g/dL 3.1* 3.5 4.1   TOTAL BILIRUBIN mg/dL 0.35 0.56 0.55     Results from last 7 days   Lab Units 02/12/24  0727 02/11/24 2008 02/11/24  1602 02/11/24  1141 02/11/24  0714 02/10/24  2019 02/10/24  1553 02/10/24  1503   POC GLUCOSE mg/dl 142* 220* 123 133 159* 117 105 119     Results from last 7 days   Lab Units 02/12/24  0617 02/11/24  0633 02/10/24  0545   GLUCOSE RANDOM mg/dL 154* 82 79     BETA-HYDROXYBUTYRATE   Date Value Ref Range Status   01/22/2024 3.2 (H) <0.6 mmol/L Final      Results from last 7 days   Lab Units 02/10/24  1012 02/10/24  0747 02/10/24  0545   HS TNI 0HR ng/L  --   --  82*   HS TNI 2HR ng/L  --  70*  --    HSTNI D2 ng/L  --  -12  --    HS TNI 4HR ng/L 69*  --   --    HSTNI D4 ng/L -13  --   --      Results from last 7 days   Lab Units 02/10/24  0545   PROTIME seconds 12.8   INR  0.94   PTT seconds 30     Results from last 7 days   Lab Units 02/10/24  0545   LIPASE u/L 62     Results from last 7 days   Lab Units 02/10/24  0545   ETHANOL LVL mg/dL 412*       ED Treatment:   Medication Administration from 02/10/2024 0537 to 02/10/2024 1529         Date/Time Order Dose Route Action     02/10/2024 0612 EST metoprolol (LOPRESSOR) injection 5 mg 5 mg Intravenous Given     02/10/2024 0611 EST thiamine tablet 100 mg 100 mg Oral Given     02/10/2024 0611 EST folic acid (FOLVITE) tablet 1 mg 1 mg Oral Given     02/10/2024 0612 EST multivitamin-minerals (CENTRUM) tablet 1 tablet 1 tablet Oral Given     02/10/2024 0647 EST aspirin chewable tablet 324 mg 324 mg Oral Given     02/10/2024 0655 EST iohexol (OMNIPAQUE) 350 MG/ML injection (MULTI-DOSE) 100 mL 100 mL Intravenous Given     02/10/2024 0757 EST sodium chloride 0.9 % bolus 1,000 mL 1,000 mL  Intravenous New Bag     02/10/2024 0919 EST nicotine (NICODERM CQ) 21 mg/24 hr TD 24 hr patch 1 patch 1 patch Transdermal Medication Applied     02/10/2024 1056 EST sodium chloride 0.9 % infusion 125 mL/hr Intravenous New Bag     02/10/2024 0855 EST magnesium sulfate 4 g/100 mL IVPB (premix) 4 g 4 g Intravenous New Bag     02/10/2024 1103 EST metoprolol tartrate (LOPRESSOR) tablet 25 mg 25 mg Oral Given     02/10/2024 1103 EST LORazepam (ATIVAN) tablet 2 mg 2 mg Oral Given          Past Medical History:   Diagnosis Date    Acute on chronic kidney failure      Alcohol withdrawal (HCC) 06/07/2019    Atrial fibrillation (HCC)     Cancer (HCC)     prostate ca,had radiation    Cardiac disease     stents,then triple bypass    COPD (chronic obstructive pulmonary disease) (HCC)     Coronary artery disease     ETOH abuse     Heart failure (HCC)     History of heart surgery     [facility] bypass [facility]    Hx of heart artery stent     2014    Hyperlipidemia     Hypertension     Hypovolemic shock ([facility]) 12/22/2019    Lumbar spondylitis ([facility]) 10/13/2022    Nasal bone fracture 10/10/2022    Prostate CA (HCC)     S/P CABG x 3     2004    Sleep apnea      Present on Admission:   COPD (chronic obstructive pulmonary disease) (HCC)   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 disease (HCC)   CAD (coronary artery disease)   Alcohol abuse with withdrawal (HCC)   Chronic heart failure with preserved ejection fraction (HCC)   Fatty liver, alcoholic   Stage 3a chronic kidney disease (HCC)   Paroxysmal atrial fibrillation (HCC)   GERD (gastroesophageal reflux disease)   Type 2 MI (myocardial infarction) (HCC)   Prostate cancer (HCC)      Admitting Diagnosis: Alcohol intoxication (HCC) [F10.929]  Chest pain [R07.9]  Transaminitis [R74.01]  Atypical chest pain [R07.89]  NSTEMI (non-ST elevated myocardial infarction) ([facility]) [I21.4]  Alcohol consumption binge drinking [F10.10]  Paroxysmal atrial fibrillation with rapid  ventricular response (HCC) [I48.0]  At risk for medication noncompliance [Z91.89]  Age/Sex: 63 y.o. male  Admission Orders:  Pt/ot eval & tx  ROSAURA protocol  Accuchecks  Scd/foot pumps  Telemetry    Scheduled Medications:  Medications 02/03 02/04 02/05 02/06 02/07 02/08 02/09 02/10 02/11 02/12   apixaban (ELIQUIS) tablet 5 mg  Dose: 5 mg  Freq: 2 times daily Route: PO  Start: 02/10/24 1800   Admin Instructions:      Order specific questions:              1759      0853     1803      0900     1800        aspirin chewable tablet 324 mg  Dose: 324 mg  Freq: Once Route: PO  Start: 02/10/24 0630 End: 02/10/24 0647           0647          aspirin chewable tablet 81 mg  Dose: 81 mg  Freq: Daily Route: PO  Start: 02/11/24 0900            0853      0900        aspirin chewable tablet 81 mg  Dose: 81 mg  Freq: Daily Route: PO  Start: 02/10/24 1000 End: 02/10/24 0954           0954-D/C'd        ferrous sulfate tablet 325 mg  Dose: 325 mg  Freq: Daily with breakfast Route: PO  Start: 02/11/24 0730   Admin Instructions:               0853      0730        fluticasone-vilanterol 200-25 mcg/actuation 1 puff  Dose: 1 puff  Freq: Daily Route: IN  Start: 02/10/24 1800   Admin Instructions:              2008 [C]      0854      0900        folic acid (FOLVITE) tablet 1 mg  Dose: 1 mg  Freq: Daily Route: PO  Start: 02/11/24 0900            0853      0900        folic acid (FOLVITE) tablet 1 mg  Dose: 1 mg  Freq: Once Route: PO  Start: 02/10/24 0600 End: 02/10/24 0611           0611          folic acid (FOLVITE) tablet 1,000 mcg  Dose: 1,000 mcg  Freq: Daily Route: PO  Start: 02/10/24 1000 End: 02/10/24 1030           1030-D/C'd  (1031)          gabapentin (NEURONTIN) capsule 300 mg  Dose: 300 mg  Freq: 3 times daily Route: PO  Start: 02/10/24 1600   Admin Instructions:              1800 2007 0853     1803     2103      0900     1600     2100         insulin lispro (HumaLOG) 100 units/mL subcutaneous injection 1-6 Units  Dose:  1-6 Units  Freq: 4 times daily (before meals and at bedtime) Route: SC  Start: 02/10/24 2200   Admin Instructions:              (2248) [C]      2313 (1672) (5315) [C]     2104 [C]      0700     1130     1600     2200         insulin lispro (HumaLOG) 100 units/mL subcutaneous injection 1-6 Units  Dose: 1-6 Units  Freq: 3 times daily before meals Route: SC  Start: 02/10/24 1130 End: 02/10/24 1602   Admin Instructions:              1138 (0392) 7592-D/C'd        lisinopril (ZESTRIL) tablet 10 mg  Dose: 10 mg  Freq: Daily Route: PO  Start: 02/10/24 1545   Admin Instructions:      Order specific questions:              2966 5776      0938        LORazepam (ATIVAN) tablet 2 mg  Dose: 2 mg  Freq: Once Route: PO  Indications of Use: ALCOHOL WITHDRAWAL SYNDROME  Indications Comment: CIWA score: 8  Start: 02/11/24 2100 End: 02/11/24 2103   Admin Instructions:               2103         LORazepam (ATIVAN) tablet 2 mg  Dose: 2 mg  Freq: Once Route: PO  Indications of Use: ALCOHOL WITHDRAWAL SYNDROME  Start: 02/11/24 1245 End: 02/11/24 1252   Admin Instructions:               1252         LORazepam (ATIVAN) tablet 2 mg  Dose: 2 mg  Freq: Once Route: PO  Indications of Use: ALCOHOL WITHDRAWAL SYNDROME  Start: 02/11/24 0915 End: 02/11/24 0907   Admin Instructions:               0907         LORazepam (ATIVAN) tablet 2 mg  Dose: 2 mg  Freq: Once Route: PO  Indications of Use: ALCOHOL WITHDRAWAL SYNDROME  Indications Comment: CIWA score 8  Start: 02/10/24 2330 End: 02/10/24 2326   Admin Instructions:              2326          LORazepam (ATIVAN) tablet 2 mg  Dose: 2 mg  Freq: Once Route: PO  Indications of Use: ALCOHOL WITHDRAWAL SYNDROME  Start: 02/10/24 1900 End: 02/10/24 1901   Admin Instructions:              1901          LORazepam (ATIVAN) tablet 2 mg  Dose: 2 mg  Freq: Once Route: PO  Start: 02/10/24 1100 End: 02/10/24 1103   Admin Instructions:              1103          magnesium sulfate 4 g/100 mL IVPB  (premix) 4 g  Dose: 4 g  Freq: Once Route: IV  Last Dose: 4 g (02/11/24 1228)  Start: 02/11/24 1100 End: 02/11/24 1628   Admin Instructions:               1228         magnesium sulfate 4 g/100 mL IVPB (premix) 4 g  Dose: 4 g  Freq: Once Route: IV  Last Dose: Stopped (02/10/24 1312)  Start: 02/10/24 0845 End: 02/10/24 1312   Admin Instructions:              0855     1312          metoprolol (LOPRESSOR) injection 5 mg  Dose: 5 mg  Freq: Once Route: IV  Start: 02/10/24 0600 End: 02/10/24 0612   Admin Instructions:              0612 [C]          metoprolol tartrate (LOPRESSOR) tablet 25 mg  Dose: 25 mg  Freq: Every 12 hours scheduled Route: PO  Start: 02/10/24 1115   Admin Instructions:      Order specific questions:              1103     2007 0853 2103 0900 2100        multivitamin-minerals (CENTRUM) tablet 1 tablet  Dose: 1 tablet  Freq: Once Route: PO  Start: 02/11/24 0900 End: 02/11/24 0853   Admin Instructions:               0853         multivitamin-minerals (CENTRUM) tablet 1 tablet  Dose: 1 tablet  Freq: Once Route: PO  Start: 02/10/24 0600 End: 02/10/24 0612   Admin Instructions:              0612          nicotine (NICODERM CQ) 21 mg/24 hr TD 24 hr patch 1 patch  Dose: 1 patch  Freq: Daily Route: TD  Start: 02/10/24 0900   Admin Instructions:              0919      0853     0856 0856     0900        pantoprazole (PROTONIX) EC tablet 40 mg  Dose: 40 mg  Freq: 2 times daily Route: PO  Start: 02/10/24 2100   Admin Instructions:              2007 0853 2103 0900 2100        pravastatin (PRAVACHOL) tablet 40 mg  Dose: 40 mg  Freq: Daily with dinner Route: PO  Start: 02/10/24 1630           1759      1803      1630        ranolazine (RANEXA) 12 hr tablet 500 mg  Dose: 500 mg  Freq: Every 12 hours Route: PO  Start: 02/10/24 2200   Admin Instructions:              2152 0901     2103      1000     2200        sodium chloride 0.9 % bolus 1,000 mL  Dose: 1,000 mL  Freq: Once  Route: IV  Last Dose: Stopped (02/10/24 1055)  Start: 02/10/24 0800 End: 02/10/24 1055           0756     1055          tamsulosin (FLOMAX) capsule 0.4 mg  Dose: 0.4 mg  Freq: Daily Route: PO  Start: 02/10/24 1545   Admin Instructions:              1800      0853      0900        thiamine tablet 100 mg  Dose: 100 mg  Freq: Once Route: PO  Start: 02/11/24 0000 End: 02/10/24 2326           2326          thiamine tablet 100 mg  Dose: 100 mg  Freq: Once Route: PO  Start: 02/10/24 0600 End: 02/10/24 0611           0611          varenicline (CHANTIX) tablet 1 mg  Dose: 1 mg  Freq: 2 times daily Route: PO  Start: 02/10/24 1000 End: 02/10/24 1024   Admin Instructions:              1024-D/C'd  (1030)          Legend:       [id number]/0302/0402/0502/0602/0702/0802/0902/1002/1102/12        Continuous Meds Sorted by Name  for Gregg Partida as of 02/03/24 through 2/12/24  Legend:       Medications 02/03 02/04 02/05 02/06 02/07 02/08 02/09 02/10 02/11 02/12   sodium chloride 0.9 % infusion  Rate: 125 mL/hr Dose: 125 mL/hr  Freq: Continuous Route: IV  Indications of Use: IV Hydration  Last Dose: 125 mL/hr (02/10/24 1056)  Start: 02/10/24 0815 End: 02/10/24 1905           1056     1905-D/C'd                    PRN Meds Sorted by Name  for Gregg Partida as of 02/03/24 through 2/12/24  Legend:         Medications 02/03 02/04 02/05 02/06 02/07 02/08 02/09 02/10 02/11 02/12   albuterol (PROVENTIL HFA,VENTOLIN HFA) inhaler 2 puff  Dose: 2 puff  Freq: Every 4 hours PRN Route: IN  PRN Reason: wheezing  Start: 02/10/24 0954   Admin Instructions:               0854         iohexol (OMNIPAQUE) 350 MG/ML injection (MULTI-DOSE) 100 mL  Dose: 100 mL  Freq: Once in imaging Route: IV  PRN Reason: contrast  Start: 02/10/24 0655 End: 02/10/24 0655           0655          melatonin tablet 6 mg  Dose: 6 mg  Freq: Daily at bedtime PRN Route: PO  PRN Reason: insomnia  Start: 02/12/24 0307             0323        senna-docusate sodium (SENOKOT  S) 8.6-50 mg per tablet 1 tablet  Dose: 1 tablet  Freq: Daily PRN Route: PO  PRN Reason: constipation  Start: 02/10/24 0954                    Network Utilization Review Department  ATTENTION: Please call with any questions or concerns to [phone] and carefully listen to the prompts so that you are directed to the right person. All voicemails are confidential.   For Discharge needs, contact Care Management DC Support Team at [phone] opt. 2  Send all requests for admission clinical reviews, approved or denied determinations and any other requests to dedicated fax number below belonging to the campus where the patient is receiving treatment. List of dedicated fax numbers for the Facilities:  FACILITY NAME UR FAX NUMBER   ADMISSION DENIALS (Administrative/Medical Necessity) [phone]   DISCHARGE SUPPORT TEAM (NETWORK) [phone]   PARENT CHILD HEALTH (Maternity/NICU/Pediatrics) [phone]   [facility] [phone]   [facility] [phone]   [facility] [phone]   [facility] [phone]   [facility] [phone]   [facility] [phone]   [facility] [phone]   [facility] [phone]   [facility] [phone]   [facility] [phone]   [facility] [phone]   [facility] [phone]

## 2024-02-12 NOTE — PROGRESS NOTES
"Daily Progress Note - [facility]  Family Medicine Residency  Gregg Partida 63 y.o. male MRN: [id number]  Unit/Bed#: [address] [place] [address] Encounter: [id number]  Admitting Physician: Brandy Connors DO   PCP: Korin Lo MD  Date of Admission:  2/10/2024  5:41 AM    Assessment and Plan    * Alcohol abuse with withdrawal (HCC)  Assessment & Plan  Chronic. Home meds: Folate, Thiamine, Naltrexone      Patient reports drinking heavily \"bottles of vodka\" since the last 10 days  Last drink reported 2/9 night. Ethanol level >400, AST 88, ALT 67  CIWA protocol = 12 on admission      Plan:   Continue home Folate, Thiamine  CIWA protocol, most recent score 4   Hold naltrexone  Monitor electrolytes     Paroxysmal atrial fibrillation (HCC)  Assessment & Plan  Pt presented to ER with weakness, tachycardia and palpitations secondary to alcohol abuse. On aspirin. Pt has not taken home Lopressor since one week. Not taking Eliquis since last admission. EKG on admission showed Afib with RVR; likely due to medication non-compliance. Given metoprolol 5 mg IV in the ER and converted to sinus.   - Most likely secondary to alcohol withdrawal/medication non-compliance  - [id number] stroke risk score 3 points      Plan  [facility] protocol for alcohol withdrawal which is likely  of frequent Afib   Continue aspirin 81 mg and Lopressor 25 mg BID  Restart Eliquis   Monitor on telemetry    Type 2 MI (myocardial infarction) (HCC)  Assessment & Plan  On admission, Troponin level elevated at 82>70>69 trending down  Denies chest pain, nausea, vomiting, diaphoresis. Inferior ST segment depressions on EKG     Non MI troponin elevation in setting of Afib RVR   Continue home Aspirin 81 mg   Cardiology consult not indicated at this time as patient spontaneously reverted back to sinus rhythm, remains sinus, troponin trended down   Closely monitor and continue 48 hour telemetry     GERD (gastroesophageal reflux disease)  Assessment & " Plan  Chronic, stable  Continue home protonix     Stage 3a chronic kidney disease (HCC)  Assessment & Plan  Lab Results   Component Value Date    EGFR 89 02/12/2024    EGFR 95 02/11/2024    EGFR 77 02/10/2024    CREATININE 0.92 02/12/2024    CREATININE 0.81 02/11/2024    CREATININE 1.04 02/10/2024     Stable, at baseline Cr of 1.0-1.1  Recommend follow-up outpatient with nephrologist    Fatty liver, alcoholic  Assessment & Plan  Hepatic steatosis on CT A/P 11/30  Mild elevation in LFTs, repeat CMP in AM  Encourage abstinence from alcohol    Prostate cancer (HCC)  Assessment & Plan  Hx prostate cancer status postradiation  Continue home Flomax    Chronic heart failure with preserved ejection fraction (HCC)  Assessment & Plan  Wt Readings from Last 3 Encounters:   02/11/24 85.3 kg (188 lb)   01/27/24 88.5 kg (195 lb 1.7 oz)   12/14/23 96.2 kg (212 lb)     Echo 9/2023: EF 62%, G2DD  Does not appear to be in acute exacerbation     Continue home metoprolol and lisinopril  Strict I&Os  Daily weights    CAD (coronary artery disease)  Assessment & Plan  Patient with hx of CAD s/p CABG. Not taking Eliquis since last admission.  Home meds: Aspirin 81 mg, Eliquis 5 mg BID (was not taking), Pravastatin 40 mg, Ranolazine 500mg     Plan  Continue with aspirin, metoprolol, statin, ranolzine  Restart Eliquis    Hx of CABG  Assessment & Plan  Troponin level elevated at 82>70> 69 but trending down  Denies chest pain, nausea, vomiting, diaphoresis. Inferior ST depressions on EKG     Likely Non MI troponin elevation in setting of Afib RVR and acute alcohol intoxication/withdrawals   48 hour telemetry  Continue home Aspirin 81 mg    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 disease (HCC)  Assessment & Plan  Lab Results   Component Value Date    HGBA1C 5.4 01/24/2024     Recent Labs     02/10/24  1503 02/10/24  1553 02/10/24  2019 02/11/24  0714   POCGLU 119 105 117 159*     Blood Sugar Average: Last 72 hrs:  (P) 125    Hold  metformin  ISS and Glucose accuchecks ACHS   Regular diet since most recent A1c was 5.4, blood glucose stable in hospital, and patient has accuchecks ACHS   Advised patient that blood glucose levels still need to be monitored and will adjust diet accordingly    Continue with gabapentin 300 mg TID daily for diabetic neuropathy     COPD (chronic obstructive pulmonary disease) ([facility])  Assessment & Plan  Patient has some shortness of breath and cough at baseline. He is a chronic smoker, 1.5 PPD. There are no PFTs or pulm notes on file. Does not appear to be in acute exacerbation.     Plan  Continue home inhalers Breo Ellipta  PRN duonebs        VTE Pharmacologic Prophylaxis: VTE Score: 5 High Risk (Score >/= 5) - Pharmacological DVT Prophylaxis Ordered: apixaban (Eliquis). Sequential Compression Devices Ordered.    Patient Centered Rounds: I have performed bedside rounds with nursing staff today.    Time Spent for Care: 20 minutes.  More than 50% of total time spent on counseling and coordination of care as described above.    Current Length of Stay: 2 day(s)    Current Patient Status: Inpatient   Certification Statement: The patient will continue to require additional inpatient hospital stay due to alcohol withdrawals     Discharge Plan: Home     Code Status: Level 1 - Full Code    Subjective:   The patient was seen and examined at bedside. He reports having mild abdominal pain with mild nausea but no vomiting. The patient is still mildly tremulous. He currently denies chest pain, palpitations, seizures, vomiting, dizziness. He endorses wanting to go to inpatient rehab after discharge and has been provided resources.     Objective     Objective:   Vitals:   Temp (24hrs), Av.6 °F (36.4 °C), Min:96 °F (35.6 °C), Max:98.4 °F (36.9 °C)    Temp:  [96 °F (35.6 °C)-98.4 °F (36.9 °C)] 96 °F (35.6 °C)  HR:  [66-89] 89  Resp:  [18-20] 18  BP: (115-162)/(57-96) 140/81  SpO2:  [96 %-100 %] 96 %  Body mass index is 25.04 kg/m².      Input and Output Summary (last 24 hours):       Intake/Output Summary (Last 24 hours) at 2/12/2024 1401  Last data filed at 2/12/2024 1327  Gross per 24 hour   Intake 1628 ml   Output 2050 ml   Net -422 ml       Physical Exam  Constitutional:       General: He is not in acute distress.     Appearance: He is normal weight.   HENT:      Head: Normocephalic.   Eyes:      General: No scleral icterus.     Extraocular Movements: Extraocular movements intact.      Conjunctiva/sclera: Conjunctivae normal.   Cardiovascular:      Rate and Rhythm: Normal rate and regular rhythm.      Heart sounds: Normal heart sounds. No murmur heard.  Pulmonary:      Effort: Pulmonary effort is normal. No respiratory distress.      Breath sounds: Normal breath sounds.   Abdominal:      General: Bowel sounds are normal.      Palpations: Abdomen is soft.      Tenderness: There is no abdominal tenderness.   Musculoskeletal:      Right lower leg: No edema.      Left lower leg: No edema.   Skin:     General: Skin is warm.      Findings: Bruising (multiple locations, right arm, right leg, back) present.   Neurological:      Mental Status: He is alert and oriented to person, place, and time.      Motor: Tremor present. No seizure activity.   Psychiatric:         Attention and Perception: Attention normal.         Behavior: Behavior is cooperative.       Additional Data:     Labs:  Results from last 7 days   Lab Units 02/12/24  0617   WBC Thousand/uL 6.47   HEMOGLOBIN g/dL 10.7*   HEMATOCRIT % 32.3*   PLATELETS Thousands/uL 129*   NEUTROS PCT % 70   LYMPHS PCT % 16   MONOS PCT % 7   EOS PCT % 5     Results from last 7 days   Lab Units 02/12/24  0617   POTASSIUM mmol/L 4.2   CHLORIDE mmol/L 100   CO2 mmol/L 28   BUN mg/dL 15   CREATININE mg/dL 0.92   CALCIUM mg/dL 8.0*   ALK PHOS U/L 62   ALT U/L 30   AST U/L 26     Results from last 7 days   Lab Units 02/10/24  0545   INR  0.94     Results from last 7 days   Lab Units 02/12/24  0727  02/11/24 2008 02/11/24  1602 02/11/24  1141 02/11/24  0714   POC GLUCOSE mg/dl 142* 220* 123 133 159*           * I Have Reviewed All Lab Data Listed Above.  * Additional Pertinent Lab Tests Reviewed: All Labs Within Last 24 Hours Reviewed            Last 24 Hours Medication List:   Current Facility-Administered Medications   Medication Dose Route Frequency Provider Last Rate    albuterol  2 puff Inhalation Q4H PRN Doron Reyes MD      apixaban  5 mg Oral BID Doron Reyes MD      aspirin  81 mg Oral Daily Doron Reyes MD      bisacodyl  5 mg Oral Daily PRN Lois Kitchen,       docusate sodium  100 mg Oral Daily Lois Kitchen, DO      ferrous sulfate  325 mg Oral Daily With Breakfast Doron Reyes MD      fluticasone-vilanterol  1 puff Inhalation Daily Doron Reyes MD      folic acid  1 mg Oral Daily Doron Reyes MD      gabapentin  300 mg Oral TID Doron Reyes MD      insulin lispro  1-6 Units Subcutaneous 4x Daily (AC & HS) Pablito Presley MD      lisinopril  10 mg Oral Daily Doron Reyes MD      melatonin  6 mg Oral HS PRN Elliott Zimmerman MD      metoprolol tartrate  25 mg Oral Q12H SEDA Doron Reyes MD      nicotine  1 patch Transdermal Daily Pablito Presley MD      pantoprazole  40 mg Oral BID Doron Reyes MD      pravastatin  40 mg Oral Daily With Dinner Doron Reyes MD      ranolazine  500 mg Oral Q12H Doron Reyes MD      senna-docusate sodium  1 tablet Oral HS Lois Kitchen DO      sodium chloride  3 g Oral BID With Meals Lois Kitchen DO      tamsulosin  0.4 mg Oral Daily Doron Reyes MD               ** Please Note: Dictation voice to text software may have been used in the creation of this document. **    Lois Kitchen DO  02/12/24  2:01 PM

## 2024-02-12 NOTE — OCCUPATIONAL THERAPY NOTE
"OT EVALUATION       02/12/24 1051   OT Last Visit   OT Visit Date 02/12/24   Note Type   Note type Evaluation   Pain Assessment   Pain Assessment Tool 0-10   Pain Score No Pain   Restrictions/Precautions   Other Precautions Chair Alarm;Bed Alarm;Fall Risk   Home Living   Type of Home Apartment   Home Layout One level;Elevator   Bathroom Shower/Tub Tub/shower unit   Bathroom Toilet Standard   Bathroom Equipment Grab bars in shower   Home Equipment Walker  (uses at times per patient)   Prior Function   Level of [place] Independent with functional mobility;Independent with ADLs;Independent with IADLS   Lives With Alone   Receives Help From Family   IADLs Independent with driving   Falls in the last 6 months 1 to 4   Lifestyle   Intrinsic Gratification fishing, spending time with 4 granchildren   Subjective   Subjective \"I walked around with the nurse this morning\"   ADL   Eating Assistance 7  Independent   Grooming Assistance 7  Independent   UB Bathing Assistance 5  Supervision/Setup   LB Bathing Assistance 5  Supervision/Setup   UB Dressing Assistance 5  Supervision/Setup   LB Dressing Assistance 5  Supervision/Setup   Toileting Assistance  5  Supervision/Setup   Transfers   Sit to Stand 5  Supervision   Stand to Sit 5  Supervision   Functional Mobility   Functional Mobility 5  Supervision   Additional Comments household distance in hallway   Balance   Static Sitting Good   Dynamic Sitting Fair +   Static Standing Fair   Dynamic Standing Fair   Activity Tolerance   Activity Tolerance Patient limited by pain   Nurse Made Aware yes   RUE Assessment   RUE Assessment WFL   LUE Assessment   LUE Assessment WFL   Cognition   Overall Cognitive Status WFL   Arousal/Participation Alert   Attention Within functional limits   Orientation Level Oriented X4   Following Commands Follows multistep commands with increased time or repetition   Assessment   Limitation Decreased ADL status;Decreased Safe judgement during " ADL;Decreased endurance;Decreased self-care trans;Decreased high-level ADLs  (decreased balance and mobility)   Prognosis Good   Assessment Patient evaluated by Occupational Therapy.  Patient admitted with Alcohol abuse with withdrawal (HCC).  The patients occupational profile, medical and therapy history includes a extensive additional review of physical, cognitive, or psychosocial history related to current functional performance.  Comorbidities affecting functional mobility and ADLS include: Afib, CABG, CAD, cancer, cardiac disease, COPD, and hypertension and ETOH.  Prior to admission, patient was independent with functional mobility with walker at times, independent with ADLS, and independent with IADLS.  The evaluation identifies the following performance deficits: weakness, impaired balance, decreased endurance, increased fall risk, new onset of impairment of functional mobility, decreased ADLS, decreased IADLS, decreased activity tolerance, and decreased safety awareness, that result in activity limitations and/or participation restrictions. This evaluation requires clinical decision making of moderate complexity, because the patient may present with comorbidities that affect occupational performance and required minimal or moderate modification of tasks or assistance with the consideration of several treatment options.  The Barthel Index was used as a functional outcome tool presenting with a score of Barthel Index Score: 70, indicating moderate limitations of functional mobility and ADLS.  The patient's raw score on the -PAC Daily Activity Inpatient Short Form is 21. A raw score of greater than or equal to 19 suggests the patient may benefit from discharge to home. Please refer to the recommendation of the Occupational Therapist for safe discharge planning.  Patient will benefit from skilled Occupational Therapy services to address above deficits and facilitate a safe return to prior level of function.    Goals   Patient Goals to go home   STG Time Frame   (1-7 days)   Short Term Goal  Goals established to promote Patient Goals: to go home:   Grooming: independent standing at sink; Bathing: independent; Lower Body Dressing: independent; Toileting: independent; Patient will increase ambulatory standard toilet transfer to independent with rolling walker to increase performance and safety with ADLS and functional mobility; Patient will increase standing tolerance to 5 minutes during ADL task to decrease assistance level and decrease fall risk; Patient will increase bed mobility to independent in preparation for ADLS and transfers; Patient will increase functional mobility to and from bathroom with rolling walker independently to increase performance with ADLS and to use a toilet; Patient will improve functional activity tolerance to 10 minutes of sustained functional tasks to increase participation in basic self-care and decrease assistance level; Patient will increase dynamic standing balance to fair+ to improve postural stability and decrease fall risk during standing ADLS and transfers.   LTG Time Frame   (8-14 days)   Long Term Goal Patient will increase standing tolerance to 10 minutes during ADL task to decrease assistance level and decrease fall risk;  Patient will improve functional activity tolerance to 20 minutes of sustained functional tasks to increase participation in basic self-care and decrease assistance level; Patient will increase dynamic standing balance to good to improve postural stability and decrease fall risk during standing ADLS and transfers.  Pt will score >/= 24/24 on AM-PAC Daily Activity Inpatient scale to promote safe independence with ADLs and functional mobility; Pt will score >/= 100/100 on Barthel Index in order to decrease caregiver assistance needed and increase ability to perform ADLs and functional mobility.   Plan   Treatment Interventions ADL retraining;Functional transfer  training;UE strengthening/ROM;Endurance training;Patient/family training;Equipment evaluation/education;Activityengagement;Compensatory technique education   Goal Expiration Date 02/26/24   OT Frequency 2-3x/wk   Discharge Recommendation   Rehab Resource Intensity Level, OT No post-acute rehabilitation needs   AM-PAC Daily Activity Inpatient   Lower Body Dressing 3   Bathing 3   Toileting 3   Upper Body Dressing 4   Grooming 4   Eating 4   Daily Activity Raw Score 21   Daily Activity Standardized Score (Calc for Raw Score >=11) 44.27   AM-PAC Applied Cognition Inpatient   Following a Speech/Presentation 4   Understanding Ordinary Conversation 4   Taking Medications 4   Remembering Where Things Are Placed or Put Away 4   Remembering List of 4-5 Errands 4   Taking Care of Complicated Tasks 4   Applied Cognition Raw Score 24   Applied Cognition Standardized Score 62.21   Barthel Index   Feeding 10   Bathing 0   Grooming Score 5   Dressing Score 5   Bladder Score 10   Bowels Score 10   Toilet Use Score 5   Transfers (Bed/Chair) Score 10   Mobility (Level Surface) Score 10   Stairs Score 5   Barthel Index Score 70   Licensure   [place] License Number  Ursula Reyes MS OTR/L [id number]

## 2024-02-13 LAB
ALBUMIN SERPL BCP-MCNC: 3.3 G/DL (ref 3.5–5)
ALP SERPL-CCNC: 60 U/L (ref 34–104)
ALT SERPL W P-5'-P-CCNC: 24 U/L (ref 7–52)
ANION GAP SERPL CALCULATED.3IONS-SCNC: 6 MMOL/L
AST SERPL W P-5'-P-CCNC: 22 U/L (ref 13–39)
BASOPHILS # BLD AUTO: 0.06 THOUSANDS/ÂΜL (ref 0–0.1)
BASOPHILS NFR BLD AUTO: 1 % (ref 0–1)
BILIRUB SERPL-MCNC: 0.39 MG/DL (ref 0.2–1)
BUN SERPL-MCNC: 17 MG/DL (ref 5–25)
CALCIUM ALBUM COR SERPL-MCNC: 8.8 MG/DL (ref 8.3–10.1)
CALCIUM SERPL-MCNC: 8.2 MG/DL (ref 8.4–10.2)
CHLORIDE SERPL-SCNC: 100 MMOL/L (ref 96–108)
CO2 SERPL-SCNC: 25 MMOL/L (ref 21–32)
CREAT SERPL-MCNC: 0.88 MG/DL (ref 0.6–1.3)
EOSINOPHIL # BLD AUTO: 0.41 THOUSAND/ÂΜL (ref 0–0.61)
EOSINOPHIL NFR BLD AUTO: 5 % (ref 0–6)
ERYTHROCYTE [DISTWIDTH] IN BLOOD BY AUTOMATED COUNT: 13.9 % (ref 11.6–15.1)
GFR SERPL CREATININE-BSD FRML MDRD: 92 ML/MIN/1.73SQ M
GLUCOSE SERPL-MCNC: 109 MG/DL (ref 65–140)
GLUCOSE SERPL-MCNC: 111 MG/DL (ref 65–140)
GLUCOSE SERPL-MCNC: 129 MG/DL (ref 65–140)
GLUCOSE SERPL-MCNC: 133 MG/DL (ref 65–140)
GLUCOSE SERPL-MCNC: 138 MG/DL (ref 65–140)
HCT VFR BLD AUTO: 32.9 % (ref 36.5–49.3)
HGB BLD-MCNC: 11 G/DL (ref 12–17)
IMM GRANULOCYTES # BLD AUTO: 0.03 THOUSAND/UL (ref 0–0.2)
IMM GRANULOCYTES NFR BLD AUTO: 0 % (ref 0–2)
LYMPHOCYTES # BLD AUTO: 1.16 THOUSANDS/ÂΜL (ref 0.6–4.47)
LYMPHOCYTES NFR BLD AUTO: 13 % (ref 14–44)
MAGNESIUM SERPL-MCNC: 1.5 MG/DL (ref 1.9–2.7)
MCH RBC QN AUTO: 32.6 PG (ref 26.8–34.3)
MCHC RBC AUTO-ENTMCNC: 33.4 G/DL (ref 31.4–37.4)
MCV RBC AUTO: 98 FL (ref 82–98)
MONOCYTES # BLD AUTO: 0.6 THOUSAND/ÂΜL (ref 0.17–1.22)
MONOCYTES NFR BLD AUTO: 7 % (ref 4–12)
NEUTROPHILS # BLD AUTO: 6.57 THOUSANDS/ÂΜL (ref 1.85–7.62)
NEUTS SEG NFR BLD AUTO: 74 % (ref 43–75)
NRBC BLD AUTO-RTO: 0 /100 WBCS
PLATELET # BLD AUTO: 97 THOUSANDS/UL (ref 149–390)
PLATELET BLD QL SMEAR: ABNORMAL
PMV BLD AUTO: 9.9 FL (ref 8.9–12.7)
POTASSIUM SERPL-SCNC: 4.2 MMOL/L (ref 3.5–5.3)
PROT SERPL-MCNC: 6.4 G/DL (ref 6.4–8.4)
RBC # BLD AUTO: 3.37 MILLION/UL (ref 3.88–5.62)
RBC MORPH BLD: NORMAL
SODIUM SERPL-SCNC: 131 MMOL/L (ref 135–147)
WBC # BLD AUTO: 8.83 THOUSAND/UL (ref 4.31–10.16)

## 2024-02-13 PROCEDURE — 82948 REAGENT STRIP/BLOOD GLUCOSE: CPT

## 2024-02-13 PROCEDURE — 80053 COMPREHEN METABOLIC PANEL: CPT

## 2024-02-13 PROCEDURE — 83735 ASSAY OF MAGNESIUM: CPT

## 2024-02-13 PROCEDURE — 85025 COMPLETE CBC W/AUTO DIFF WBC: CPT

## 2024-02-13 PROCEDURE — 99232 SBSQ HOSP IP/OBS MODERATE 35: CPT | Performed by: INTERNAL MEDICINE

## 2024-02-13 RX ORDER — MAGNESIUM SULFATE HEPTAHYDRATE 40 MG/ML
4 INJECTION, SOLUTION INTRAVENOUS ONCE
Status: COMPLETED | OUTPATIENT
Start: 2024-02-13 | End: 2024-02-13

## 2024-02-13 RX ORDER — METOPROLOL TARTRATE 50 MG/1
50 TABLET, FILM COATED ORAL EVERY 12 HOURS SCHEDULED
Status: DISCONTINUED | OUTPATIENT
Start: 2024-02-13 | End: 2024-02-14 | Stop reason: HOSPADM

## 2024-02-13 RX ORDER — LABETALOL HYDROCHLORIDE 5 MG/ML
10 INJECTION, SOLUTION INTRAVENOUS EVERY 6 HOURS PRN
Status: DISCONTINUED | OUTPATIENT
Start: 2024-02-13 | End: 2024-02-14 | Stop reason: HOSPADM

## 2024-02-13 RX ORDER — MAGNESIUM HYDROXIDE/ALUMINUM HYDROXICE/SIMETHICONE 120; 1200; 1200 MG/30ML; MG/30ML; MG/30ML
30 SUSPENSION ORAL EVERY 4 HOURS PRN
Status: DISCONTINUED | OUTPATIENT
Start: 2024-02-13 | End: 2024-02-14 | Stop reason: HOSPADM

## 2024-02-13 RX ORDER — LORAZEPAM 1 MG/1
2 TABLET ORAL ONCE
Status: COMPLETED | OUTPATIENT
Start: 2024-02-13 | End: 2024-02-13

## 2024-02-13 RX ADMIN — GABAPENTIN 300 MG: 300 CAPSULE ORAL at 17:28

## 2024-02-13 RX ADMIN — SODIUM CHLORIDE 3 G: 1 TABLET ORAL at 17:28

## 2024-02-13 RX ADMIN — ASPIRIN 81 MG CHEWABLE TABLET 81 MG: 81 TABLET CHEWABLE at 08:54

## 2024-02-13 RX ADMIN — APIXABAN 5 MG: 5 TABLET, FILM COATED ORAL at 08:54

## 2024-02-13 RX ADMIN — GABAPENTIN 300 MG: 300 CAPSULE ORAL at 08:54

## 2024-02-13 RX ADMIN — TAMSULOSIN HYDROCHLORIDE 0.4 MG: 0.4 CAPSULE ORAL at 08:54

## 2024-02-13 RX ADMIN — APIXABAN 5 MG: 5 TABLET, FILM COATED ORAL at 17:30

## 2024-02-13 RX ADMIN — PANTOPRAZOLE SODIUM 40 MG: 40 TABLET, DELAYED RELEASE ORAL at 21:11

## 2024-02-13 RX ADMIN — LORAZEPAM 2 MG: 1 TABLET ORAL at 11:52

## 2024-02-13 RX ADMIN — FLUTICASONE FUROATE AND VILANTEROL TRIFENATATE 1 PUFF: 200; 25 POWDER RESPIRATORY (INHALATION) at 08:53

## 2024-02-13 RX ADMIN — RANOLAZINE 500 MG: 500 TABLET, FILM COATED, EXTENDED RELEASE ORAL at 10:25

## 2024-02-13 RX ADMIN — PANTOPRAZOLE SODIUM 40 MG: 40 TABLET, DELAYED RELEASE ORAL at 08:54

## 2024-02-13 RX ADMIN — SODIUM CHLORIDE 3 G: 1 TABLET ORAL at 08:53

## 2024-02-13 RX ADMIN — LISINOPRIL 10 MG: 10 TABLET ORAL at 08:54

## 2024-02-13 RX ADMIN — PRAVASTATIN SODIUM 40 MG: 40 TABLET ORAL at 17:30

## 2024-02-13 RX ADMIN — MAGNESIUM SULFATE 4 G: 4 INJECTION INTRAVENOUS at 08:11

## 2024-02-13 RX ADMIN — METOPROLOL TARTRATE 50 MG: 50 TABLET, FILM COATED ORAL at 21:12

## 2024-02-13 RX ADMIN — ALUMINUM HYDROXIDE, MAGNESIUM HYDROXIDE, AND DIMETHICONE 30 ML: 200; 20; 200 SUSPENSION ORAL at 03:37

## 2024-02-13 RX ADMIN — LORAZEPAM 2 MG: 1 TABLET ORAL at 03:19

## 2024-02-13 RX ADMIN — GABAPENTIN 300 MG: 300 CAPSULE ORAL at 21:10

## 2024-02-13 RX ADMIN — RANOLAZINE 500 MG: 500 TABLET, FILM COATED, EXTENDED RELEASE ORAL at 21:10

## 2024-02-13 RX ADMIN — METOPROLOL TARTRATE 25 MG: 25 TABLET, FILM COATED ORAL at 08:54

## 2024-02-13 RX ADMIN — LABETALOL HYDROCHLORIDE 10 MG: 5 INJECTION, SOLUTION INTRAVENOUS at 07:57

## 2024-02-13 RX ADMIN — FOLIC ACID 1 MG: 1 TABLET ORAL at 08:54

## 2024-02-13 RX ADMIN — NICOTINE 1 PATCH: 21 PATCH, EXTENDED RELEASE TRANSDERMAL at 08:00

## 2024-02-13 RX ADMIN — FERROUS SULFATE TAB 325 MG (65 MG ELEMENTAL FE) 325 MG: 325 (65 FE) TAB at 08:53

## 2024-02-13 NOTE — QUICK NOTE
Called regarding patient's BP which was 200/144 and . Upon evaluation, patient is alert and laying in bed. Repeat BP better. SBP in the 160's. EKG done and personally reviewed. NSR with no ST elevations. Rate was 79. Patient also complains of mid sternal chest pressure. He states he sometimes gets this at home also and walking helps to alleviate it. Sometimes burping helps also. Pain is not reproducible. Will give a dose of mylanta for possible indigestion.   Continue to follow closely.   Consider rechecking troponins if pain continues but his EKG was stable.

## 2024-02-13 NOTE — PLAN OF CARE
Problem: Potential for Falls  Goal: Patient will remain free of falls  Description: INTERVENTIONS:  - Educate patient/family on patient safety including physical limitations  - Instruct patient to call for assistance with activity   - Consult OT/PT to assist with strengthening/mobility   - Keep Call bell within reach  - Keep bed low and locked with side rails adjusted as appropriate  - Keep care items and personal belongings within reach  - Initiate and maintain comfort rounds  - Make Fall Risk Sign visible to staff  - Apply yellow socks and bracelet for high fall risk patients  - Consider moving patient to room near nurses station  Outcome: Progressing     Problem: Prexisting or High Potential for Compromised Skin Integrity  Goal: Skin integrity is maintained or improved  Description: INTERVENTIONS:  - Identify patients at risk for skin breakdown  - Assess and monitor skin integrity  - Assess and monitor nutrition and hydration status  - Monitor labs   - Assess for incontinence   - Turn and reposition patient  - Assist with mobility/ambulation  - Relieve pressure over bony prominences  - Avoid friction and shearing  - Provide appropriate hygiene as needed including keeping skin clean and dry  - Evaluate need for skin moisturizer/barrier cream  - Collaborate with interdisciplinary team   - Patient/family teaching  - Consider wound care consult   Outcome: Progressing     Problem: PAIN - ADULT  Goal: Verbalizes/displays adequate comfort level or baseline comfort level  Description: Interventions:  - Encourage patient to monitor pain and request assistance  - Assess pain using appropriate pain scale  - Administer analgesics based on type and severity of pain and evaluate response  - Implement non-pharmacological measures as appropriate and evaluate response  - Consider cultural and social influences on pain and pain management  - Notify physician/advanced practitioner if interventions unsuccessful or patient reports  new pain  Outcome: Progressing     Problem: INFECTION - ADULT  Goal: Absence or prevention of progression during hospitalization  Description: INTERVENTIONS:  - Assess and monitor for signs and symptoms of infection  - Monitor lab/diagnostic results  - Monitor all insertion sites, i.e. indwelling lines, tubes, and drains  - Monitor endotracheal if appropriate and nasal secretions for changes in amount and color  - [place] appropriate cooling/warming therapies per order  - Administer medications as ordered  - Instruct and encourage patient and family to use good hand hygiene technique  - Identify and instruct in appropriate isolation precautions for identified infection/condition  Outcome: Progressing  Goal: Absence of fever/infection during neutropenic period  Description: INTERVENTIONS:  - Monitor WBC    Outcome: Progressing     Problem: SAFETY ADULT  Goal: Patient will remain free of falls  Description: INTERVENTIONS:  - Educate patient/family on patient safety including physical limitations  - Instruct patient to call for assistance with activity   - Consult OT/PT to assist with strengthening/mobility   - Keep Call bell within reach  - Keep bed low and locked with side rails adjusted as appropriate  - Keep care items and personal belongings within reach  - Initiate and maintain comfort rounds  - Make Fall Risk Sign visible to staff  - Apply yellow socks and bracelet for high fall risk patients  - Consider moving patient to room near nurses station  Outcome: Progressing  Goal: Maintain or return to baseline ADL function  Description: INTERVENTIONS:  -  Assess patient's ability to carry out ADLs; assess patient's baseline for ADL function and identify physical deficits which impact ability to perform ADLs (bathing, care of mouth/teeth, toileting, grooming, dressing, etc.)  - Assess/evaluate cause of self-care deficits   - Assess range of motion  - Assess patient's mobility; develop plan if impaired  - Assess  patient's need for assistive devices and provide as appropriate  - Encourage maximum independence but intervene and supervise when necessary  - Involve family in performance of ADLs  - Assess for home care needs following discharge   - Consider OT consult to assist with ADL evaluation and planning for discharge  - Provide patient education as appropriate  Outcome: Progressing  Goal: Maintains/Returns to pre admission functional level  Description: INTERVENTIONS:  - Perform AM-PAC 6 Click Basic Mobility/ Daily Activity assessment daily.  - Set and communicate daily mobility goal to care team and patient/family/caregiver.   - Collaborate with rehabilitation services on mobility goals if consulted  - Out of bed for toileting  - Record patient progress and toleration of activity level   Outcome: Progressing     Problem: DISCHARGE PLANNING  Goal: Discharge to home or other facility with appropriate resources  Description: INTERVENTIONS:  - Identify barriers to discharge w/patient and caregiver  - Arrange for needed discharge resources and transportation as appropriate  - Identify discharge learning needs (meds, wound care, etc.)  - Arrange for interpretive services to assist at discharge as needed  - Refer to Case Management Department for coordinating discharge planning if the patient needs post-hospital services based on physician/advanced practitioner order or complex needs related to functional status, cognitive ability, or social support system  Outcome: Progressing     Problem: Knowledge Deficit  Goal: Patient/family/caregiver demonstrates understanding of disease process, treatment plan, medications, and discharge instructions  Description: Complete learning assessment and assess knowledge base.  Interventions:  - Provide teaching at level of understanding  - Provide teaching via preferred learning methods  Outcome: Progressing

## 2024-02-13 NOTE — ASSESSMENT & PLAN NOTE
Chronic hyponatremia in setting of alcohol abuse, poor oral intake, and possible SIADH   Follows with nephrology Dr. Dugan outpatient    Nephrology previously recommended 0250-9290 ml fluid restriction daily as outpatient   1800 ml fluid restriction while hospitalized   Continue home regimen of salt tablets 3 g BID   Monitor BP closely while taking salt tablets   Patient was educated on importance of compliance with fluid restriction as salt tablet dose should not be increased due to potential side effect of worsening hypertension.

## 2024-02-13 NOTE — DISCHARGE SUMMARY
Discharge Summary - [facility]     Patient Information: Gregg Partida 63 y.o. male MRN: [id number]  Unit/Bed#: [address] [place] [address] Encounter: [id number]     Admitting Physician: Brandy Connors DO  Discharging Physician/Practitioner: Samuel Thompson MD   PCP: Korin Lo MD  Admission Date:   Admission Orders (From admission, onward)       Ordered        02/10/24 0753  INPATIENT ADMISSION  Once                          Discharge Date: 02/14/24      Reason for Admission: Alcohol intoxication (HCC) [F10.929]  Chest pain [R07.9]  Transaminitis [R74.01]  Atypical chest pain [R07.89]  NSTEMI (non-ST elevated myocardial infarction) (HCC) [I21.4]  Alcohol consumption binge drinking [F10.10]  Paroxysmal atrial fibrillation with rapid ventricular response (HCC) [I48.0]  At risk for medication noncompliance [Z91.89]     Discharge Diagnoses:      Principal Problem:    Alcohol abuse with withdrawal (HCC)  Active Problems:    Paroxysmal atrial fibrillation (HCC)    Hyponatremia    COPD (chronic obstructive pulmonary disease) (HCC)    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 disease (HCC)    Hx of CABG    CAD (coronary artery disease)    Chronic heart failure with preserved ejection fraction (HCC)    Prostate cancer (HCC)    Fatty liver, alcoholic    Stage 3a chronic kidney disease (HCC)    GERD (gastroesophageal reflux disease)    Elevated troponin  Resolved Problems:    * No resolved hospital problems. *       Consultations During Hospital Stay:  ·       N/A     Procedures Performed:   ·       N/A     Significant Findings / Test Results:   02/14: Hgb 11.3, Plt 83, Mg 1.5, Na 133  02/13: Hgb 11.0, Plt 97, Mg 1.5, Na 131  02/12: Hgb 10.7, Plt 129, Mg 1.5, Na 132  02/11: AST 53, ALT 47, Mg 1.7   02/10: AST 88, ALT 67, Mg 1.3, troponins 82-> 70-> 69    CT spine: No acute cervical spine fracture or traumatic malalignment  CT head: No acute intracranial CT abnormality      Incidental Findings:   ·     CT chest abdomen pelvis w contrast: Chest:, Interval callus formation in the previously fractured posterior right 10th and 11th rib fractures with mild worsening of the fracture displacement suspicious for acute on chronic process if there is supporting clinical findings., Abdomen and pelvis:, 1. No acute abdominopelvic CT abnormality., 2. Hepatic steatosis., 3. Bilateral extrarenal and intrarenal vascular calcification. Tiny right kidney upper pole nonobstructing calculus., 4. Left greater than right bilateral adrenal nodules are stable from 10/17/2019 . Although its imaging features on this study is indeterminate, because this lesion has been stable for > 1 year, it is considered benign. However, please note that for , adrenal nodule > 1 cm, biochemical evaluation is suggested to rule out functioning adenoma, if not already performed., 5. Stable multifocal abdominal aortic ectasia       Test Results Pending at Discharge (will require follow up):   ·       N/A     Outpatient Tests Requested:  ·       BMP, Magnesium, CBC in 1 week      Outpatient follow-up Requested:  [facility]  Nephrology   Cardiology   Inpatient or Outpatient Alcohol Rehabilitation    Complications:  None    Things to address at first visit after hospitalization   Are you planning to check into an inpatient alcohol rehab or outpatient program?  Have you followed up with Nephrology and Cardiology? Or at least scheduled an appointment?  Follow up with Nephrology for incidental CT findings   Are you taking your new dose of lopressor (50mg BID)?  Did you complete follow up labs (CBC, CMP, magnesium)   Did you start Maalox? Are you still having indigestion?   For PCP - Consider adding Norvasc for better BP control     HPI from admission:  Gregg Partida is a 63 y.o. male with a history of A-fib on Eliquis (currently not taking), CKD, alcohol dependence, COPD, CAD, HTN, and HLD that presents with alcohol  intoxication and after sustaining a mechanical fall a day multiple times prior to admission. He was admitted in the hospital for similar complaint less than a month ago. At that time, he was monitored on CIWA protocol. Patient  seems to be diffusely tremulous on admission, has prominent gait disturbance but not disoriented.  According to him he has not been eating any food or taking any medications since a week, and has only been binge drinking.  He is also smoking every day, 1.5 packs a day.  Patient denies any shortness of breath, chest pain, abdominal pain, diarrhea, constipation.  He does have a mild headache, nausea and had 1 episode of vomiting in the morning.  Patient had a few falls at home, prior to this admission.  He does not have recollection of what happened.     On his last admission, he left AMA despite being low on platelets and having disturbed electrolytes.  It was advised to get a CBC BMP done as outpatient before a follow-up TCM visit at [place].  Multiple attempts were made to reach out to the patient as per chart review, however due to him not having a working number, attempts were unsuccessful.  Eliquis was to be restarted as outpatient depending upon his platelet count.  Per chart review, an attempt was made yesterday to reach out to the patient to restart it, but patient could not be reached.    Hospital Course:      Gregg Partida is a 63 y.o. male patient with PMH Afib, CKD, Alcohol dependence, COPD, HTN, HLD who originally presented to the hospital on 2/10/2024 due to alcohol intoxication and mechanical fall.  In the ED, patient was also in Afib with RVR.  He converted to NSR with IV metoprolol.  He was restarted on his home medications of Lopressor and Eliquis during the hospitalization and had no recurrence of Afib with RVR.  Patient's ethanol level was elevated on admission.  He was monitored on CIWA protocol and required ativan for the first three days of hospitalization.  As  "patient went through withdrawal, he had multiple episodes of severe hypertension associated with tachycardia.  His home medication of lopressor was increased and his pressures and heart rates improved. By day of discharge, patient had not required ativan for >24 hours. Patient declining transfer to inpatient rehab at this time and would prefer to be discharged home.    * Alcohol abuse with withdrawal (HCC)  Assessment & Plan  Chronic. Home meds: Folate, Thiamine, Naltrexone      Patient reports drinking heavily \"bottles of vodka\" since the last 10 days  Last drink reported 2/9 night. Ethanol level >400, AST 88, ALT 67  CIWA protocol = 12 on admission      Continued home Folate, Thiamine  [facility] protocol, most recent score 2 on discharge   Can restart naltrexone on discharge   Repeat labs (CBC, CMP, magnesium)    Patient wants to go to inpatient rehab after discharge from hospital. Resources provided.     Paroxysmal atrial fibrillation (HCC)  Assessment & Plan  Pt presented to ER with weakness, tachycardia and palpitations secondary to alcohol abuse. On aspirin. Pt has not taken home Lopressor since one week. Not taking Eliquis since last admission. EKG on admission showed Afib with RVR; likely due to medication non-compliance. Given metoprolol 5 mg IV in the ER and converted to sinus.   - Most likely secondary to alcohol withdrawal/medication non-compliance  - [id number] stroke risk score 3 points      [facility] protocol for alcohol withdrawal which is likely  of frequent Afib   Continue aspirin 81 mg   Increased Lopressor from 25 mg BID to 50 mg BID   Restarted Eliquis   Monitored on telemetry  Follow up with cardiology outpatient     Hyponatremia  Assessment & Plan  Chronic hyponatremia in setting of alcohol abuse, poor oral intake, and possible SIADH   Follows with nephrology Dr. Dugan outpatient    Nephrology previously recommended 3507-8520 ml fluid restriction daily as outpatient   1800 ml fluid restriction " while hospitalized   Continue home regimen of salt tablets 3 g BID with meals on discharge   Patient was educated on importance of compliance with fluid restriction as salt tablet dose should not be increased due to potential side effect of worsening hypertension.    Elevated troponin  Assessment & Plan  On admission, Troponin level elevated at 82>70>69 trending down  Denies chest pain, nausea, vomiting, diaphoresis. Inferior ST segment depressions on EKG   Non MI troponin elevation in setting of Afib RVR   Continued home Aspirin 81 mg   Telemetry   Follow up with cardiology outpatient     GERD (gastroesophageal reflux disease)  Assessment & Plan  Chronic, stable  Continued home protonix   Started Maalox on discharge     Stage 3a chronic kidney disease (HCC)  Assessment & Plan  Lab Results   Component Value Date    EGFR 92 02/13/2024    EGFR 89 02/12/2024    EGFR 95 02/11/2024    CREATININE 0.88 02/13/2024    CREATININE 0.92 02/12/2024    CREATININE 0.81 02/11/2024     Stable, at baseline Cr of 1.0-1.1  Recommend follow-up outpatient with nephrologist    Fatty liver, alcoholic  Assessment & Plan  Hepatic steatosis on CT A/P 11/30  Mild elevation in LFTs, normalized through hospitalization   Encourage abstinence from alcohol  Repeat CMP 1 week after discharge    Prostate cancer (HCC)  Assessment & Plan  Hx prostate cancer status postradiation  Continued home Flomax    Chronic heart failure with preserved ejection fraction (HCC)  Assessment & Plan  Wt Readings from Last 3 Encounters:   02/13/24 88.4 kg (194 lb 14.2 oz)   01/27/24 88.5 kg (195 lb 1.7 oz)   12/14/23 96.2 kg (212 lb)     Echo 9/2023: EF 62%, G2DD  Did not appear to be in acute exacerbation     Continued home lisinopril  Increased home metoprolol for better BP control     CAD (coronary artery disease)  Assessment & Plan  Patient with hx of CAD s/p CABG. Not taking Eliquis since last admission.  Home meds: Aspirin 81 mg, Eliquis 5 mg BID (was not taking),  "Pravastatin 40 mg, Ranolazine 500mg     Continued with aspirin, statin, ranolzine  Restarted Eliquis  Increased metoprolol     Hx of CABG  Assessment & Plan  Troponin level elevated at 82>70> 69 but trending down  Denies chest pain, nausea, vomiting, diaphoresis. Inferior ST depressions on EKG   Likely Non MI troponin elevation in setting of Afib RVR and acute alcohol intoxication/withdrawals     Telemetry  Continued home Aspirin 81 mg    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 disease (HCC)  Assessment & Plan  Lab Results   Component Value Date    HGBA1C 5.4 01/24/2024     Recent Labs     02/13/24  1129 02/13/24  1552 02/13/24  2002 02/14/24  0755 02/14/24  1119   POCGLU 133 129 138 88 130     Held metformin while in hospital. ISS and Glucose accuchecks ACHS   Regular diet since most recent A1c was 5.4, blood glucose stable in hospital, and patient has accuchecks ACHS   Continued with gabapentin 300 mg TID daily for diabetic neuropathy   Resume metformin on discharge.     COPD (chronic obstructive pulmonary disease) ([facility])  Assessment & Plan  Patient has some shortness of breath and cough at baseline. He is a chronic smoker, 1.5 PPD. There are no PFTs or pulm notes on file. Does not appear to be in acute exacerbation.     Continued home inhalers Breo Ellipta  PRN duoneradha       Condition at Discharge: stable      Discharge Day Visit / Exam:      Vitals: Blood Pressure: 168/93 (02/14/24 0743)  Pulse: 71 (02/14/24 0743)  Temperature: 98 °F (36.7 °C) (02/14/24 0743)  Temp Source: Oral (02/14/24 0743)  Respirations: 18 (02/13/24 2204)  Height: 6' 2\" (188 cm) (02/10/24 1500)  Weight - Scale: 89.8 kg (198 lb) (02/14/24 0554)  SpO2: 95 % (02/14/24 0743)  Exam:   Physical Exam  Constitutional:       General: He is not in acute distress.     Appearance: He is normal weight.   HENT:      Head: Normocephalic.   Eyes:      General: No scleral icterus.     Extraocular Movements: Extraocular movements intact.      " Conjunctiva/sclera: Conjunctivae normal.   Cardiovascular:      Rate and Rhythm: Normal rate and regular rhythm.      Heart sounds: Normal heart sounds. No murmur heard.  Pulmonary:      Effort: Pulmonary effort is normal. No respiratory distress.      Breath sounds: Normal breath sounds.   Abdominal:      General: Bowel sounds are normal.      Palpations: Abdomen is soft.      Tenderness: There is no abdominal tenderness.   Musculoskeletal:      Right lower leg: No edema.      Left lower leg: No edema.   Skin:     General: Skin is warm.      Findings: Bruising (multiple locations, right arm, right leg, back) present.   Neurological:      General: No focal deficit present.      Mental Status: He is alert and oriented to person, place, and time.      Motor: No tremor or seizure activity.      Gait: Gait is intact. Gait normal.   Psychiatric:         Attention and Perception: Attention normal.         Speech: Speech normal.         Behavior: Behavior is agitated. Behavior is cooperative.            Discharge instructions/Information to patient and family:   See after visit summary for information provided to patient and family.       Discharge Medications:     Medication List      START taking these medications     aluminum-magnesium hydroxide-simethicone 4431-9592-449 mg/30 mL   suspension; Commonly known as: MAALOX; Take 30 mL by mouth every 4 (four)   hours as needed for indigestion or heartburn   sodium chloride 1 g tablet; Take 3 tablets (3 g total) by mouth 2 (two)   times a day with meals     CHANGE how you take these medications     metoprolol tartrate 50 mg tablet; Commonly known as: LOPRESSOR; Take 1   tablet (50 mg total) by mouth every 12 (twelve) hours; What changed:   medication strength, how much to take     CONTINUE taking these medications     Adult Blood Pressure Cuff Lg Kit; Use in the morning   albuterol 90 mcg/act inhaler; Commonly known as: Ventolin HFA; Inhale 2   puffs every 4 (four) hours as  needed for wheezing   apixaban 5 mg; Commonly known as: ELIQUIS; Take 1 tablet (5 mg total) by   mouth 2 (two) times a day Do not start before January 31, 2024.   aspirin 81 mg EC tablet; Commonly known as: ECOTRIN LOW STRENGTH   Breo Ellipta 200-25 MCG/ACT inhaler; Generic drug:   fluticasone-vilanterol; Inhale 1 puff daily Rinse mouth after use.   ferrous sulfate 325 (65 Fe) mg tablet; Take 1 tablet (325 mg total) by   mouth daily with breakfast   folic acid 1 mg tablet; Commonly known as: FOLVITE; TAKE 1 TABLET DAILY   gabapentin 300 mg capsule; Commonly known as: NEURONTIN; TAKE ONE   CAPSULE THREE TIMES DAILY   lisinopril 20 mg tablet; Commonly known as: ZESTRIL; Take 0.5 tablets   (10 mg total) by mouth daily   magnesium Oxide 400 mg Tabs; Commonly known as: MAG-OX; Take 1 tablet   (400 mg total) by mouth 2 (two) times a day   metFORMIN 500 mg tablet; Commonly known as: GLUCOPHAGE; Take 1 tablet   (500 mg total) by mouth daily with breakfast Do not start before September 23, 2023.   naltrexone 50 mg tablet; Commonly known as: REVIA; Take 1 tablet (50 mg   total) by mouth daily   nicotine 21 mg/24 hr TD 24 hr patch; Commonly known as: NICODERM CQ;   Place 1 patch on the skin over 24 hours daily Do not start before December 4, 2023.   ONE TOUCH ULTRA MINI w/Device Kit   OneTouch Delica Lancets Fine Misc   pantoprazole 40 mg tablet; Commonly known as: PROTONIX; Take 1 tablet   (40 mg total) by mouth 2 (two) times a day   pravastatin 40 mg tablet; Commonly known as: PRAVACHOL; TAKE 1 TABLET   DAILY WITH DINNER   ranolazine 500 mg 12 hr tablet; Commonly known as: RANEXA; TAKE 1 TABLET   EVERY 12 HOURS   senna-docusate sodium 8.6-50 mg per tablet; Commonly known as: SENOKOT   S; Take 1 tablet by mouth daily as needed for constipation   tamsulosin 0.4 mg; Commonly known as: FLOMAX; TAKE 1 CAPSULE DAILY   varenicline 1 mg tablet; Commonly known as: CHANTIX; TAKE 1 TABLET 2   TIMES A DAY   vitamin B-1 100 MG Tabs;  TAKE 1 TABLET DAILY     STOP taking these medications     ENSURE PO        Disposition:   Home     For Discharges to [facility]:   ·       Not Applicable to this Patient - Not Applicable to this Patient     Discharge Statement:  I spent 45 minutes discharging the patient. This time was spent on the day of discharge. I had direct contact with the patient on the day of discharge. Greater than 50% of the total time was spent examining patient, answering all patient questions, arranging and discussing plan of care with patient as well as directly providing post-discharge instructions.  Additional time then spent on discharge activities.     ** Please Note: This note has been constructed using a voice recognition system **     Lois Kitchen DO   02/14/24  1:01 PM

## 2024-02-13 NOTE — ED CARE HANDOFF
[facility] Warm Handoff Outcome Note    Patient name Gregg Partida  Location [address] [address]/[address] [address]-* MRN [id number]  Age: 63 y.o.          Plan Type:  Warm Handoff                                                                                    Plan Date: 2/13/2024  Service:  ED Warm Handoff      Substance Use History:  ETOH    Warm Handoff Update:  Pt still in Warm Handoff queue for [facility] after being admitted; will follow up    Warm Handoff Outcome: Residential  Inpatient

## 2024-02-13 NOTE — PROGRESS NOTES
"Daily Progress Note - [facility]  Family Medicine Residency  Gregg Partida 63 y.o. male MRN: [id number]  Unit/Bed#: [address] [place] [address] Encounter: [id number]  Admitting Physician: Brandy Connors DO   PCP: Korin Lo MD  Date of Admission:  2/10/2024  5:41 AM    Assessment and Plan    * Alcohol abuse with withdrawal (HCC)  Assessment & Plan  Chronic. Home meds: Folate, Thiamine, Naltrexone      Patient reports drinking heavily \"bottles of vodka\" since the last 10 days  Last drink reported 2/9 night. Ethanol level >400, AST 88, ALT 67  CIWA protocol = 12 on admission      Plan:   Continue home Folate, Thiamine  CIWA protocol, most recent score 4   Hold naltrexone  Monitor electrolytes   Patient wants to go to inpatient rehab after discharge from hospital. Resources provided.     Paroxysmal atrial fibrillation (HCC)  Assessment & Plan  Pt presented to ER with weakness, tachycardia and palpitations secondary to alcohol abuse. On aspirin. Pt has not taken home Lopressor since one week. Not taking Eliquis since last admission. EKG on admission showed Afib with RVR; likely due to medication non-compliance. Given metoprolol 5 mg IV in the ER and converted to sinus.   - Most likely secondary to alcohol withdrawal/medication non-compliance  - [id number] stroke risk score 3 points      Plan  CIWA protocol for alcohol withdrawal which is likely  of frequent Afib   Continue aspirin 81 mg and Lopressor 25 mg BID  Restart Eliquis   Monitor on telemetry    Type 2 MI (myocardial infarction) (HCC)  Assessment & Plan  On admission, Troponin level elevated at 82>70>69 trending down  Denies chest pain, nausea, vomiting, diaphoresis. Inferior ST segment depressions on EKG     Non MI troponin elevation in setting of Afib RVR   Continue home Aspirin 81 mg   Cardiology consult not indicated at this time as patient spontaneously reverted back to sinus rhythm, remains sinus, troponin trended down   Closely " monitor and continue telemetry     GERD (gastroesophageal reflux disease)  Assessment & Plan  Chronic, stable  Continue home protonix     Stage 3a chronic kidney disease (HCC)  Assessment & Plan  Lab Results   Component Value Date    EGFR 92 02/13/2024    EGFR 89 02/12/2024    EGFR 95 02/11/2024    CREATININE 0.88 02/13/2024    CREATININE 0.92 02/12/2024    CREATININE 0.81 02/11/2024     Stable, at baseline Cr of 1.0-1.1  Recommend follow-up outpatient with nephrologist    Fatty liver, alcoholic  Assessment & Plan  Hepatic steatosis on CT A/P 11/30  Mild elevation in LFTs, repeat CMP in AM  Encourage abstinence from alcohol    Prostate cancer (HCC)  Assessment & Plan  Hx prostate cancer status postradiation  Continue home Flomax    Chronic heart failure with preserved ejection fraction (HCC)  Assessment & Plan  Wt Readings from Last 3 Encounters:   02/13/24 88.4 kg (194 lb 14.2 oz)   01/27/24 88.5 kg (195 lb 1.7 oz)   12/14/23 96.2 kg (212 lb)     Echo 9/2023: EF 62%, G2DD  Does not appear to be in acute exacerbation     Continue home metoprolol and lisinopril  Strict I&Os  Daily weights    Hyponatremia  Assessment & Plan  Chronic hyponatremia in setting of alcohol abuse, poor oral intake, and possible SIADH   Follows with nephrology Dr. Dugan outpatient    Nephrology previously recommended 0062-9895 ml fluid restriction daily as outpatient   1800 ml fluid restriction while hospitalized   Continue home regimen of salt tablets 3 mg BID   Monitor BP closely while taking salt tablets   Patient was educated on importance of compliance with fluid restriction as salt tablet dose should not be increased due to potential side effect of worsening hypertension.     CAD (coronary artery disease)  Assessment & Plan  Patient with hx of CAD s/p CABG. Not taking Eliquis since last admission.  Home meds: Aspirin 81 mg, Eliquis 5 mg BID (was not taking), Pravastatin 40 mg, Ranolazine 500mg     Plan  Continue with aspirin,  metoprolol, statin, ranolzine  Restart Eliquis    Hx of CABG  Assessment & Plan  Troponin level elevated at 82>70> 69 but trending down  Denies chest pain, nausea, vomiting, diaphoresis. Inferior ST depressions on EKG     Likely Non MI troponin elevation in setting of Afib RVR and acute alcohol intoxication/withdrawals   Telemetry  Continue home Aspirin 81 mg    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 disease (HCC)  Assessment & Plan  Lab Results   Component Value Date    HGBA1C 5.4 01/24/2024     Recent Labs     02/12/24  1556 02/12/24 2015 02/13/24  0711 02/13/24  1129   POCGLU 135 126 109 133     Blood Sugar Average: Last 72 hrs:  (P) 135.6919348709262103    Hold metformin  ISS and Glucose accuchecks ACHS   Regular diet since most recent A1c was 5.4, blood glucose stable in hospital, and patient has accuchecks ACHS   Advised patient that blood glucose levels still need to be monitored and will adjust diet accordingly    Continue with gabapentin 300 mg TID daily for diabetic neuropathy     COPD (chronic obstructive pulmonary disease) (HCC)  Assessment & Plan  Patient has some shortness of breath and cough at baseline. He is a chronic smoker, 1.5 PPD. There are no PFTs or pulm notes on file. Does not appear to be in acute exacerbation.     Plan  Continue home inhalers Breo Ellipta  PRN duonebs        VTE Pharmacologic Prophylaxis: VTE Score: 5 High Risk (Score >/= 5) - Pharmacological DVT Prophylaxis Ordered: apixaban (Eliquis). Sequential Compression Devices Ordered.    Patient Centered Rounds: I have performed bedside rounds with nursing staff today.    Education and Discussions with Family / Patient:   Patient was educated on hyponatremia due to alcohol abuse and importance of compliance with fluid restriction and salt tablet. Salt tablet dose cannot be increased due to potential side effect of worsening hypertension.     Time Spent for Care: 20 minutes.  More than 50% of total time spent on  counseling and coordination of care as described above.    Current Length of Stay: 3 day(s)    Current Patient Status: Inpatient   Certification Statement: The patient will continue to require additional inpatient hospital stay due to alcohol withdrawals     Discharge Plan: Home    Code Status: Level 1 - Full Code    Subjective:   Overnight patient reported chest pain, sob, and headaches, and also found to have elevated /144. He also had a CIWA score of 11 at this time, received Ativan. Per patient, the chest pain, sob, and headaches resolved about 20-40 minutes after getting Ativan at which time his BP normalized as well.   On evaluation this morning, patient was seen and examined at bedside. He states he still has a mild headache but that it is much improved from last night. He is asking for more Ativan as he still feels agitated. He is also upset with his fluid restriction and wants it to be removed. Patient currently denies any chest pain, shortness of breath, dizziness, abdominal pain, nausea, or vomiting. He is able to ambulate well without assistance to the bathroom.     Objective     Objective:   Vitals:   Temp (24hrs), Av.1 °F (36.7 °C), Min:97.4 °F (36.3 °C), Max:99 °F (37.2 °C)    Temp:  [97.4 °F (36.3 °C)-99 °F (37.2 °C)] 97.8 °F (36.6 °C)  HR:  [] 72  Resp:  [17-19] 17  BP: (144-222)/() 146/101  SpO2:  [92 %-97 %] 96 %  Body mass index is 25.02 kg/m².     Input and Output Summary (last 24 hours):       Intake/Output Summary (Last 24 hours) at 2024 1327  Last data filed at 2024 1201  Gross per 24 hour   Intake 210 ml   Output 3300 ml   Net -3090 ml       Physical Exam:   Physical Exam  Constitutional:       General: He is not in acute distress.     Appearance: He is normal weight.   HENT:      Head: Normocephalic.   Eyes:      General: No scleral icterus.     Extraocular Movements: Extraocular movements intact.      Conjunctiva/sclera: Conjunctivae normal.    Cardiovascular:      Rate and Rhythm: Normal rate and regular rhythm.      Heart sounds: Normal heart sounds. No murmur heard.  Pulmonary:      Effort: Pulmonary effort is normal. No respiratory distress.      Breath sounds: Normal breath sounds.   Abdominal:      General: Bowel sounds are normal.      Palpations: Abdomen is soft.      Tenderness: There is no abdominal tenderness.   Musculoskeletal:      Right lower leg: No edema.      Left lower leg: No edema.   Skin:     General: Skin is warm.      Findings: Bruising (multiple locations, right arm, right leg, back) present.   Neurological:      General: No focal deficit present.      Mental Status: He is alert and oriented to person, place, and time.      Motor: Tremor (improved from prior) present. No seizure activity.      Gait: Gait normal.   Psychiatric:         Attention and Perception: Attention normal.         Speech: Speech normal.         Behavior: Behavior is agitated. Behavior is cooperative.       Additional Data:     Labs:  Results from last 7 days   Lab Units 02/13/24  0532   WBC Thousand/uL 8.83   HEMOGLOBIN g/dL 11.0*   HEMATOCRIT % 32.9*   PLATELETS Thousands/uL 97*   NEUTROS PCT % 74   LYMPHS PCT % 13*   MONOS PCT % 7   EOS PCT % 5     Results from last 7 days   Lab Units 02/13/24  0532   POTASSIUM mmol/L 4.2   CHLORIDE mmol/L 100   CO2 mmol/L 25   BUN mg/dL 17   CREATININE mg/dL 0.88   CALCIUM mg/dL 8.2*   ALK PHOS U/L 60   ALT U/L 24   AST U/L 22     Results from last 7 days   Lab Units 02/10/24  0545   INR  0.94     Results from last 7 days   Lab Units 02/13/24  1129 02/13/24  0711 02/12/24  2015 02/12/24  1556 02/12/24  0727   POC GLUCOSE mg/dl 133 109 126 135 142*           * I Have Reviewed All Lab Data Listed Above.  * Additional Pertinent Lab Tests Reviewed: All Labs Within Last 24 Hours Reviewed          Last 24 Hours Medication List:   Current Facility-Administered Medications   Medication Dose Route Frequency Provider Last Rate     albuterol  2 puff Inhalation Q4H PRN Doron Reyes MD      aluminum-magnesium hydroxide-simethicone  30 mL Oral Q4H PRN CHELSEA Walker      apixaban  5 mg Oral BID Doron Reyes MD      aspirin  81 mg Oral Daily Droon Reyes MD      bisacodyl  5 mg Oral Daily PRN Loisdavid Kitchen, DO      docusate sodium  100 mg Oral Daily Lois Kitchen, DO      ferrous sulfate  325 mg Oral Daily With Breakfast Doron Reyes MD      fluticasone-vilanterol  1 puff Inhalation Daily Doron Reyes MD      folic acid  1 mg Oral Daily Doron Reyes MD      gabapentin  300 mg Oral TID Doron Reyes MD      insulin lispro  1-6 Units Subcutaneous 4x Daily (AC & HS) Pablito Presley MD      labetalol  10 mg Intravenous Q6H PRN CHELSEA Walker      lisinopril  10 mg Oral Daily Doron Reyes MD      melatonin  6 mg Oral HS PRN Elliott Zimmerman MD      metoprolol tartrate  25 mg Oral Q12H SEDA Doron Reyes MD      nicotine  1 patch Transdermal Daily Pabilto Presley MD      pantoprazole  40 mg Oral BID Doron Reyes MD      pravastatin  40 mg Oral Daily With Dinner Doron Reyes MD      ranolazine  500 mg Oral Q12H Doron Reyes MD      senna-docusate sodium  1 tablet Oral HS Lois Kitchen, DO      sodium chloride  3 g Oral BID With Meals Lois Kitchen, DO      tamsulosin  0.4 mg Oral Daily Doron Reyes MD               ** Please Note: Dictation voice to text software may have been used in the creation of this document. **    Lois Kitchen DO  02/13/24  1:27 PM

## 2024-02-13 NOTE — PLAN OF CARE
Problem: Potential for Falls  Goal: Patient will remain free of falls  Description: INTERVENTIONS:  - Educate patient/family on patient safety including physical limitations  - Instruct patient to call for assistance with activity   - Consult OT/PT to assist with strengthening/mobility   - Keep Call bell within reach  - Keep bed low and locked with side rails adjusted as appropriate  - Keep care items and personal belongings within reach  - Initiate and maintain comfort rounds  - Make Fall Risk Sign visible to staff  - Offer Toileting every 2 Hours, in advance of need  - Initiate/Maintain alarm  - Obtain necessary fall risk management equipment: Yellow socks  - Apply yellow socks and bracelet for high fall risk patients  - Consider moving patient to room near nurses station  Outcome: Progressing     Problem: Prexisting or High Potential for Compromised Skin Integrity  Goal: Skin integrity is maintained or improved  Description: INTERVENTIONS:  - Identify patients at risk for skin breakdown  - Assess and monitor skin integrity  - Assess and monitor nutrition and hydration status  - Monitor labs   - Assess for incontinence   - Turn and reposition patient  - Assist with mobility/ambulation  - Relieve pressure over bony prominences  - Avoid friction and shearing  - Provide appropriate hygiene as needed including keeping skin clean and dry  - Evaluate need for skin moisturizer/barrier cream  - Collaborate with interdisciplinary team   - Patient/family teaching  - Consider wound care consult   Outcome: Progressing

## 2024-02-14 ENCOUNTER — TRANSITIONAL CARE MANAGEMENT (OUTPATIENT)
Dept: FAMILY MEDICINE CLINIC | Facility: CLINIC | Age: 64
End: 2024-02-14

## 2024-02-14 VITALS
OXYGEN SATURATION: 95 % | RESPIRATION RATE: 18 BRPM | HEIGHT: 74 IN | SYSTOLIC BLOOD PRESSURE: 168 MMHG | DIASTOLIC BLOOD PRESSURE: 93 MMHG | TEMPERATURE: 98 F | WEIGHT: 198 LBS | BODY MASS INDEX: 25.41 KG/M2 | HEART RATE: 71 BPM

## 2024-02-14 PROBLEM — R79.89 ELEVATED TROPONIN: Status: ACTIVE | Noted: 2024-02-10

## 2024-02-14 LAB
ALBUMIN SERPL BCP-MCNC: 3.4 G/DL (ref 3.5–5)
ALP SERPL-CCNC: 57 U/L (ref 34–104)
ALT SERPL W P-5'-P-CCNC: 24 U/L (ref 7–52)
ANION GAP SERPL CALCULATED.3IONS-SCNC: 6 MMOL/L
AST SERPL W P-5'-P-CCNC: 23 U/L (ref 13–39)
BASOPHILS # BLD AUTO: 0.09 THOUSANDS/ÂΜL (ref 0–0.1)
BASOPHILS NFR BLD AUTO: 1 % (ref 0–1)
BILIRUB SERPL-MCNC: 0.34 MG/DL (ref 0.2–1)
BUN SERPL-MCNC: 21 MG/DL (ref 5–25)
CALCIUM ALBUM COR SERPL-MCNC: 8.9 MG/DL (ref 8.3–10.1)
CALCIUM SERPL-MCNC: 8.4 MG/DL (ref 8.4–10.2)
CHLORIDE SERPL-SCNC: 103 MMOL/L (ref 96–108)
CO2 SERPL-SCNC: 24 MMOL/L (ref 21–32)
CREAT SERPL-MCNC: 0.96 MG/DL (ref 0.6–1.3)
EOSINOPHIL # BLD AUTO: 0.43 THOUSAND/ÂΜL (ref 0–0.61)
EOSINOPHIL NFR BLD AUTO: 5 % (ref 0–6)
ERYTHROCYTE [DISTWIDTH] IN BLOOD BY AUTOMATED COUNT: 14.4 % (ref 11.6–15.1)
GFR SERPL CREATININE-BSD FRML MDRD: 84 ML/MIN/1.73SQ M
GLUCOSE SERPL-MCNC: 111 MG/DL (ref 65–140)
GLUCOSE SERPL-MCNC: 130 MG/DL (ref 65–140)
GLUCOSE SERPL-MCNC: 88 MG/DL (ref 65–140)
HCT VFR BLD AUTO: 33.1 % (ref 36.5–49.3)
HGB BLD-MCNC: 11.3 G/DL (ref 12–17)
IMM GRANULOCYTES # BLD AUTO: 0.05 THOUSAND/UL (ref 0–0.2)
IMM GRANULOCYTES NFR BLD AUTO: 1 % (ref 0–2)
LYMPHOCYTES # BLD AUTO: 1.3 THOUSANDS/ÂΜL (ref 0.6–4.47)
LYMPHOCYTES NFR BLD AUTO: 16 % (ref 14–44)
MAGNESIUM SERPL-MCNC: 1.5 MG/DL (ref 1.9–2.7)
MCH RBC QN AUTO: 33.5 PG (ref 26.8–34.3)
MCHC RBC AUTO-ENTMCNC: 34.1 G/DL (ref 31.4–37.4)
MCV RBC AUTO: 98 FL (ref 82–98)
MONOCYTES # BLD AUTO: 0.72 THOUSAND/ÂΜL (ref 0.17–1.22)
MONOCYTES NFR BLD AUTO: 9 % (ref 4–12)
NEUTROPHILS # BLD AUTO: 5.56 THOUSANDS/ÂΜL (ref 1.85–7.62)
NEUTS SEG NFR BLD AUTO: 68 % (ref 43–75)
NRBC BLD AUTO-RTO: 0 /100 WBCS
PLATELET # BLD AUTO: 83 THOUSANDS/UL (ref 149–390)
PMV BLD AUTO: 11.3 FL (ref 8.9–12.7)
POTASSIUM SERPL-SCNC: 4.4 MMOL/L (ref 3.5–5.3)
PROT SERPL-MCNC: 6.4 G/DL (ref 6.4–8.4)
RBC # BLD AUTO: 3.37 MILLION/UL (ref 3.88–5.62)
SODIUM SERPL-SCNC: 133 MMOL/L (ref 135–147)
WBC # BLD AUTO: 8.15 THOUSAND/UL (ref 4.31–10.16)

## 2024-02-14 PROCEDURE — 83735 ASSAY OF MAGNESIUM: CPT

## 2024-02-14 PROCEDURE — 82948 REAGENT STRIP/BLOOD GLUCOSE: CPT

## 2024-02-14 PROCEDURE — 80053 COMPREHEN METABOLIC PANEL: CPT

## 2024-02-14 PROCEDURE — 85025 COMPLETE CBC W/AUTO DIFF WBC: CPT

## 2024-02-14 PROCEDURE — 99238 HOSP IP/OBS DSCHRG MGMT 30/<: CPT | Performed by: INTERNAL MEDICINE

## 2024-02-14 RX ORDER — SODIUM CHLORIDE 1 G/1
3 TABLET ORAL 2 TIMES DAILY WITH MEALS
Qty: 180 TABLET | Refills: 0 | Status: SHIPPED | OUTPATIENT
Start: 2024-02-14 | End: 2024-02-29

## 2024-02-14 RX ORDER — METOPROLOL TARTRATE 50 MG/1
50 TABLET, FILM COATED ORAL EVERY 12 HOURS SCHEDULED
Qty: 60 TABLET | Refills: 0 | Status: SHIPPED | OUTPATIENT
Start: 2024-02-14

## 2024-02-14 RX ORDER — MAGNESIUM HYDROXIDE/ALUMINUM HYDROXICE/SIMETHICONE 120; 1200; 1200 MG/30ML; MG/30ML; MG/30ML
30 SUSPENSION ORAL EVERY 4 HOURS PRN
Qty: 769 ML | Refills: 1 | Status: SHIPPED | OUTPATIENT
Start: 2024-02-14

## 2024-02-14 RX ORDER — MAGNESIUM SULFATE HEPTAHYDRATE 40 MG/ML
4 INJECTION, SOLUTION INTRAVENOUS ONCE
Status: COMPLETED | OUTPATIENT
Start: 2024-02-14 | End: 2024-02-14

## 2024-02-14 RX ADMIN — DOCUSATE SODIUM 100 MG: 100 CAPSULE, LIQUID FILLED ORAL at 08:30

## 2024-02-14 RX ADMIN — MAGNESIUM SULFATE 4 G: 4 INJECTION INTRAVENOUS at 08:34

## 2024-02-14 RX ADMIN — GABAPENTIN 300 MG: 300 CAPSULE ORAL at 08:30

## 2024-02-14 RX ADMIN — METOPROLOL TARTRATE 50 MG: 50 TABLET, FILM COATED ORAL at 08:30

## 2024-02-14 RX ADMIN — FERROUS SULFATE TAB 325 MG (65 MG ELEMENTAL FE) 325 MG: 325 (65 FE) TAB at 08:30

## 2024-02-14 RX ADMIN — ALUMINUM HYDROXIDE, MAGNESIUM HYDROXIDE, AND DIMETHICONE 30 ML: 200; 20; 200 SUSPENSION ORAL at 10:34

## 2024-02-14 RX ADMIN — LISINOPRIL 10 MG: 10 TABLET ORAL at 08:30

## 2024-02-14 RX ADMIN — SODIUM CHLORIDE 3 G: 1 TABLET ORAL at 08:30

## 2024-02-14 RX ADMIN — APIXABAN 5 MG: 5 TABLET, FILM COATED ORAL at 08:30

## 2024-02-14 RX ADMIN — FOLIC ACID 1 MG: 1 TABLET ORAL at 08:30

## 2024-02-14 RX ADMIN — ASPIRIN 81 MG CHEWABLE TABLET 81 MG: 81 TABLET CHEWABLE at 08:30

## 2024-02-14 RX ADMIN — FLUTICASONE FUROATE AND VILANTEROL TRIFENATATE 1 PUFF: 200; 25 POWDER RESPIRATORY (INHALATION) at 08:34

## 2024-02-14 RX ADMIN — Medication 6 MG: at 01:36

## 2024-02-14 RX ADMIN — PANTOPRAZOLE SODIUM 40 MG: 40 TABLET, DELAYED RELEASE ORAL at 08:30

## 2024-02-14 RX ADMIN — NICOTINE 1 PATCH: 21 PATCH, EXTENDED RELEASE TRANSDERMAL at 08:30

## 2024-02-14 RX ADMIN — TAMSULOSIN HYDROCHLORIDE 0.4 MG: 0.4 CAPSULE ORAL at 08:30

## 2024-02-14 RX ADMIN — RANOLAZINE 500 MG: 500 TABLET, FILM COATED, EXTENDED RELEASE ORAL at 10:30

## 2024-02-14 NOTE — CASE MANAGEMENT
Case Management Discharge Planning Note    Patient name Gregg Partida  Location 4 Protection 415/4 Protection 415-* MRN [id number]  : 1960 Date 2024       Current Admission Date: 2/10/2024  Current Admission Diagnosis:Alcohol abuse with withdrawal (HCC)   Patient Active Problem List    Diagnosis Date Noted    Elevated troponin 02/10/2024    Hypothyroidism 2024    Troponin level elevated 2024    Bacterial conjunctivitis of both eyes 2024    Abnormal thyroid stimulating hormone level 2023    Chest pain 2023    Paroxysmal atrial fibrillation (HCC) 2023    GERD (gastroesophageal reflux disease) 2023    Sleep apnea 2023    Stable angina 2022    Stage 3a chronic kidney disease (HCC) 2022    Fatty liver, alcoholic 04/15/2022    Nonischemic nontraumatic myocardial injury 04/10/2022    History of atrial fibrillation 10/25/2021    Mild protein-calorie malnutrition ([facility]) 2021    Prostate cancer ([facility]) 2021    History of Atrial fibrillation ([facility]) 2020    Chronic heart failure with preserved ejection fraction ([facility]) 2019    Alcohol abuse with withdrawal delirium ([facility]) 2019    Alcohol abuse with withdrawal ([facility]) 10/17/2019    Hyponatremia 10/17/2019    Cervical spinal stenosis 10/17/2019    Thrombocytopenia ([facility]) 2019    History of prostate cancer 2019    CAD (coronary artery disease) 2019    Nicotine abuse 2019    Hypomagnesemia 10/10/2018    Depression, recurrent ([facility]) 10/10/2018    Hx of CABG 10/10/2018    Dyslipidemia 10/10/2018    COPD (chronic obstructive pulmonary disease) ([facility]) 10/09/2018    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 disease ([facility]) 10/09/2018    Hypertensive heart and chronic kidney disease with heart failure and stage 1 through stage 4 chronic kidney disease, or chronic kidney disease ([facility]) 10/09/2018      LOS (days): 4  Geometric Mean LOS (GMLOS) (days): 2.3  Days to GMLOS:-2      OBJECTIVE:  Risk of Unplanned Readmission Score: 45.92       Current admission status: Inpatient   Preferred Pharmacy:   [facility] [address] [address]  Phone: [phone] Fax: [phone]    Primary Care Provider: Korin oL MD    Primary Insurance: [employer] [facility] REP  Secondary Insurance:     DISCHARGE DETAILS:     Requested Home Health Care         Is the patient interested in HHC at discharge?: No    DME Referral Provided  Referral made for DME?: No    Other Referral/Resources/Interventions Provided:  Interventions: Transportation    Would you like to participate in our [facility] service program?  : No - Declined    Treatment Team Recommendation: Home  Discharge Destination Plan:: Home  Transport at Discharge : Ride Share (SW was notified by medical team that patient needs a Lyft home.  Patient signed Lyft waiver and waiver placed in scan bin.  Lyft requested in [employer].)  Dispatcher Contacted: Yes  Number/Name of Dispatcher: [facility] via [employer]  Transported by (Company and Unit #): [employer]  ETA of Transport (Date): 02/14/24  ETA of Transport (Time): 1415         IMM Given (Date):: 02/14/24  IMM Given to:: Patient (IMM reviewed with and signed by patient.  Patient is in agreement with discharge determination.  Copy given to patient and copy placed in scan bin for chart.)

## 2024-02-14 NOTE — PLAN OF CARE
Problem: Potential for Falls  Goal: Patient will remain free of falls  Description: INTERVENTIONS:  - Educate patient/family on patient safety including physical limitations  - Instruct patient to call for assistance with activity   - Consult OT/PT to assist with strengthening/mobility   - Keep Call bell within reach  - Keep bed low and locked with side rails adjusted as appropriate  - Keep care items and personal belongings within reach  - Initiate and maintain comfort rounds  - Make Fall Risk Sign visible to staff  - Offer Toileting every 2 Hours, in advance of need  - Initiate/Maintain bed alarm  - Apply yellow socks and bracelet for high fall risk patients  - Consider moving patient to room near nurses station  2/14/2024 1326 by Cassie Guerin RN  Outcome: Adequate for Discharge  2/14/2024 1056 by Cassie Guerin RN  Outcome: Progressing     Problem: Prexisting or High Potential for Compromised Skin Integrity  Goal: Skin integrity is maintained or improved  Description: INTERVENTIONS:  - Identify patients at risk for skin breakdown  - Assess and monitor skin integrity  - Assess and monitor nutrition and hydration status  - Monitor labs   - Assess for incontinence   - Turn and reposition patient  - Assist with mobility/ambulation  - Relieve pressure over bony prominences  - Avoid friction and shearing  - Provide appropriate hygiene as needed including keeping skin clean and dry  - Evaluate need for skin moisturizer/barrier cream  - Collaborate with interdisciplinary team   - Patient/family teaching  - Consider wound care consult   2/14/2024 1326 by Cassie Guerin RN  Outcome: Adequate for Discharge  2/14/2024 1056 by Cassie Guerin RN  Outcome: Progressing     Problem: PAIN - ADULT  Goal: Verbalizes/displays adequate comfort level or baseline comfort level  Description: Interventions:  - Encourage patient to monitor pain and request assistance  - Assess pain using appropriate pain  scale  - Administer analgesics based on type and severity of pain and evaluate response  - Implement non-pharmacological measures as appropriate and evaluate response  - Consider cultural and social influences on pain and pain management  - Notify physician/advanced practitioner if interventions unsuccessful or patient reports new pain  2/14/2024 1326 by Cassie Guerin RN  Outcome: Adequate for Discharge  2/14/2024 1056 by Cassie Guerin RN  Outcome: Progressing     Problem: INFECTION - ADULT  Goal: Absence or prevention of progression during hospitalization  Description: INTERVENTIONS:  - Assess and monitor for signs and symptoms of infection  - Monitor lab/diagnostic results  - Monitor all insertion sites, i.e. indwelling lines, tubes, and drains  - Monitor endotracheal if appropriate and nasal secretions for changes in amount and color  - [place] appropriate cooling/warming therapies per order  - Administer medications as ordered  - Instruct and encourage patient and family to use good hand hygiene technique  - Identify and instruct in appropriate isolation precautions for identified infection/condition  2/14/2024 1326 by Cassie Guerin RN  Outcome: Adequate for Discharge  2/14/2024 1056 by Cassie Guerin RN  Outcome: Progressing  Goal: Absence of fever/infection during neutropenic period  Description: INTERVENTIONS:  - Monitor WBC    2/14/2024 1326 by Cassie Guerin RN  Outcome: Adequate for Discharge  2/14/2024 1056 by Cassie Guerin RN  Outcome: Progressing     Problem: SAFETY ADULT  Goal: Patient will remain free of falls  Description: INTERVENTIONS:  - Educate patient/family on patient safety including physical limitations  - Instruct patient to call for assistance with activity   - Consult OT/PT to assist with strengthening/mobility   - Keep Call bell within reach  - Keep bed low and locked with side rails adjusted as appropriate  - Keep care items and personal  belongings within reach  - Initiate and maintain comfort rounds  - Make Fall Risk Sign visible to staff  - Offer Toileting every 2 Hours, in advance of need  - Initiate/Maintain bed alarm  - Apply yellow socks and bracelet for high fall risk patients  - Consider moving patient to room near nurses station  2/14/2024 1326 by Cassie Guerin RN  Outcome: Adequate for Discharge  2/14/2024 1056 by Cassie Guerin RN  Outcome: Progressing  Goal: Maintain or return to baseline ADL function  Description: INTERVENTIONS:  -  Assess patient's ability to carry out ADLs; assess patient's baseline for ADL function and identify physical deficits which impact ability to perform ADLs (bathing, care of mouth/teeth, toileting, grooming, dressing, etc.)  - Assess/evaluate cause of self-care deficits   - Assess range of motion  - Assess patient's mobility; develop plan if impaired  - Assess patient's need for assistive devices and provide as appropriate  - Encourage maximum independence but intervene and supervise when necessary  - Involve family in performance of ADLs  - Assess for home care needs following discharge   - Consider OT consult to assist with ADL evaluation and planning for discharge  - Provide patient education as appropriate  2/14/2024 1326 by Cassie Guerin RN  Outcome: Adequate for Discharge  2/14/2024 1056 by Cassie Guerin RN  Outcome: Progressing  Goal: Maintains/Returns to pre admission functional level  Description: INTERVENTIONS:  - Perform AM-PAC 6 Click Basic Mobility/ Daily Activity assessment daily.  - Set and communicate daily mobility goal to care team and patient/family/caregiver.   - Collaborate with rehabilitation services on mobility goals if consulted  - Perform Range of Motion 2 times a day.  - Reposition patient every 2 hours.  - Dangle patient 2 times a day  - Stand patient 2 times a day  - Ambulate patient 2 times a day  - Out of bed to chair 2 times a day   - Out of bed  for meals 2 times a day  - Out of bed for toileting  - Record patient progress and toleration of activity level   2/14/2024 1326 by Cassie Guerin RN  Outcome: Adequate for Discharge  2/14/2024 1056 by Cassie Guerin RN  Outcome: Progressing     Problem: DISCHARGE PLANNING  Goal: Discharge to home or other facility with appropriate resources  Description: INTERVENTIONS:  - Identify barriers to discharge w/patient and caregiver  - Arrange for needed discharge resources and transportation as appropriate  - Identify discharge learning needs (meds, wound care, etc.)  - Arrange for interpretive services to assist at discharge as needed  - Refer to Case Management Department for coordinating discharge planning if the patient needs post-hospital services based on physician/advanced practitioner order or complex needs related to functional status, cognitive ability, or social support system  2/14/2024 1326 by Cassie Guerin RN  Outcome: Adequate for Discharge  2/14/2024 1056 by Cassie Guerin RN  Outcome: Progressing     Problem: Knowledge Deficit  Goal: Patient/family/caregiver demonstrates understanding of disease process, treatment plan, medications, and discharge instructions  Description: Complete learning assessment and assess knowledge base.  Interventions:  - Provide teaching at level of understanding  - Provide teaching via preferred learning methods  2/14/2024 1326 by Cassie Guerin RN  Outcome: Adequate for Discharge  2/14/2024 1056 by Cassie Guerin RN  Outcome: Progressing

## 2024-02-14 NOTE — PLAN OF CARE
Problem: Potential for Falls  Goal: Patient will remain free of falls  Description: INTERVENTIONS:  - Educate patient/family on patient safety including physical limitations  - Instruct patient to call for assistance with activity   - Consult OT/PT to assist with strengthening/mobility   - Keep Call bell within reach  - Keep bed low and locked with side rails adjusted as appropriate  - Keep care items and personal belongings within reach  - Initiate and maintain comfort rounds  - Make Fall Risk Sign visible to staff  - Offer Toileting every 2 Hours, in advance of need  - Initiate/Maintain bed alarm  - Obtain necessary fall risk management equipment: yellow socks  - Apply yellow socks and bracelet for high fall risk patients  - Consider moving patient to room near nurses station  Outcome: Progressing     Problem: Prexisting or High Potential for Compromised Skin Integrity  Goal: Skin integrity is maintained or improved  Description: INTERVENTIONS:  - Identify patients at risk for skin breakdown  - Assess and monitor skin integrity  - Assess and monitor nutrition and hydration status  - Monitor labs   - Assess for incontinence   - Turn and reposition patient  - Assist with mobility/ambulation  - Relieve pressure over bony prominences  - Avoid friction and shearing  - Provide appropriate hygiene as needed including keeping skin clean and dry  - Evaluate need for skin moisturizer/barrier cream  - Collaborate with interdisciplinary team   - Patient/family teaching  - Consider wound care consult   Outcome: Progressing     Problem: PAIN - ADULT  Goal: Verbalizes/displays adequate comfort level or baseline comfort level  Description: Interventions:  - Encourage patient to monitor pain and request assistance  - Assess pain using appropriate pain scale  - Administer analgesics based on type and severity of pain and evaluate response  - Implement non-pharmacological measures as appropriate and evaluate response  - Consider  cultural and social influences on pain and pain management  - Notify physician/advanced practitioner if interventions unsuccessful or patient reports new pain  Outcome: Progressing     Problem: INFECTION - ADULT  Goal: Absence or prevention of progression during hospitalization  Description: INTERVENTIONS:  - Assess and monitor for signs and symptoms of infection  - Monitor lab/diagnostic results  - Monitor all insertion sites, i.e. indwelling lines, tubes, and drains  - Monitor endotracheal if appropriate and nasal secretions for changes in amount and color  - [place] appropriate cooling/warming therapies per order  - Administer medications as ordered  - Instruct and encourage patient and family to use good hand hygiene technique  - Identify and instruct in appropriate isolation precautions for identified infection/condition  Outcome: Progressing  Goal: Absence of fever/infection during neutropenic period  Description: INTERVENTIONS:  - Monitor WBC    Outcome: Progressing     Problem: SAFETY ADULT  Goal: Patient will remain free of falls  Description: INTERVENTIONS:  - Educate patient/family on patient safety including physical limitations  - Instruct patient to call for assistance with activity   - Consult OT/PT to assist with strengthening/mobility   - Keep Call bell within reach  - Keep bed low and locked with side rails adjusted as appropriate  - Keep care items and personal belongings within reach  - Initiate and maintain comfort rounds  - Make Fall Risk Sign visible to staff  - Offer Toileting every 2 Hours, in advance of need  - Initiate/Maintain bed alarm  - Obtain necessary fall risk management equipment: yellow socks  - Apply yellow socks and bracelet for high fall risk patients  - Consider moving patient to room near nurses station  Outcome: Progressing  Goal: Maintain or return to baseline ADL function  Description: INTERVENTIONS:  -  Assess patient's ability to carry out ADLs; assess patient's  baseline for ADL function and identify physical deficits which impact ability to perform ADLs (bathing, care of mouth/teeth, toileting, grooming, dressing, etc.)  - Assess/evaluate cause of self-care deficits   - Assess range of motion  - Assess patient's mobility; develop plan if impaired  - Assess patient's need for assistive devices and provide as appropriate  - Encourage maximum independence but intervene and supervise when necessary  - Involve family in performance of ADLs  - Assess for home care needs following discharge   - Consider OT consult to assist with ADL evaluation and planning for discharge  - Provide patient education as appropriate  Outcome: Progressing  Goal: Maintains/Returns to pre admission functional level  Description: INTERVENTIONS:  - Perform AM-PAC 6 Click Basic Mobility/ Daily Activity assessment daily.  - Set and communicate daily mobility goal to care team and patient/family/caregiver.   - Collaborate with rehabilitation services on mobility goals if consulted  - Perform Range of Motion 2 times a day.  - Reposition patient every 2 hours.  - Dangle patient 3 times a day  - Stand patient 3 times a day  - Ambulate patient 3 times a day  - Out of bed to chair 3 times a day   - Out of bed for meals 3 times a day  - Out of bed for toileting  - Record patient progress and toleration of activity level   Outcome: Progressing     Problem: DISCHARGE PLANNING  Goal: Discharge to home or other facility with appropriate resources  Description: INTERVENTIONS:  - Identify barriers to discharge w/patient and caregiver  - Arrange for needed discharge resources and transportation as appropriate  - Identify discharge learning needs (meds, wound care, etc.)  - Arrange for interpretive services to assist at discharge as needed  - Refer to Case Management Department for coordinating discharge planning if the patient needs post-hospital services based on physician/advanced practitioner order or complex needs  related to functional status, cognitive ability, or social support system  Outcome: Progressing     Problem: Knowledge Deficit  Goal: Patient/family/caregiver demonstrates understanding of disease process, treatment plan, medications, and discharge instructions  Description: Complete learning assessment and assess knowledge base.  Interventions:  - Provide teaching at level of understanding  - Provide teaching via preferred learning methods  Outcome: Progressing

## 2024-02-14 NOTE — PLAN OF CARE
Problem: Potential for Falls  Goal: Patient will remain free of falls  Description: INTERVENTIONS:  - Educate patient/family on patient safety including physical limitations  - Instruct patient to call for assistance with activity   - Consult OT/PT to assist with strengthening/mobility   - Keep Call bell within reach  - Keep bed low and locked with side rails adjusted as appropriate  - Keep care items and personal belongings within reach  - Initiate and maintain comfort rounds  - Make Fall Risk Sign visible to staff  - Offer Toileting every 2 Hours, in advance of need  - Initiate/Maintain bed alarm  - Apply yellow socks and bracelet for high fall risk patients  - Consider moving patient to room near nurses station  Outcome: Progressing     Problem: Prexisting or High Potential for Compromised Skin Integrity  Goal: Skin integrity is maintained or improved  Description: INTERVENTIONS:  - Identify patients at risk for skin breakdown  - Assess and monitor skin integrity  - Assess and monitor nutrition and hydration status  - Monitor labs   - Assess for incontinence   - Turn and reposition patient  - Assist with mobility/ambulation  - Relieve pressure over bony prominences  - Avoid friction and shearing  - Provide appropriate hygiene as needed including keeping skin clean and dry  - Evaluate need for skin moisturizer/barrier cream  - Collaborate with interdisciplinary team   - Patient/family teaching  - Consider wound care consult   Outcome: Progressing     Problem: PAIN - ADULT  Goal: Verbalizes/displays adequate comfort level or baseline comfort level  Description: Interventions:  - Encourage patient to monitor pain and request assistance  - Assess pain using appropriate pain scale  - Administer analgesics based on type and severity of pain and evaluate response  - Implement non-pharmacological measures as appropriate and evaluate response  - Consider cultural and social influences on pain and pain management  -  Notify physician/advanced practitioner if interventions unsuccessful or patient reports new pain  Outcome: Progressing     Problem: INFECTION - ADULT  Goal: Absence or prevention of progression during hospitalization  Description: INTERVENTIONS:  - Assess and monitor for signs and symptoms of infection  - Monitor lab/diagnostic results  - Monitor all insertion sites, i.e. indwelling lines, tubes, and drains  - Monitor endotracheal if appropriate and nasal secretions for changes in amount and color  - [place] appropriate cooling/warming therapies per order  - Administer medications as ordered  - Instruct and encourage patient and family to use good hand hygiene technique  - Identify and instruct in appropriate isolation precautions for identified infection/condition  Outcome: Progressing     Problem: INFECTION - ADULT  Goal: Absence of fever/infection during neutropenic period  Description: INTERVENTIONS:  - Monitor WBC    Outcome: Progressing     Problem: SAFETY ADULT  Goal: Patient will remain free of falls  Description: INTERVENTIONS:  - Educate patient/family on patient safety including physical limitations  - Instruct patient to call for assistance with activity   - Consult OT/PT to assist with strengthening/mobility   - Keep Call bell within reach  - Keep bed low and locked with side rails adjusted as appropriate  - Keep care items and personal belongings within reach  - Initiate and maintain comfort rounds  - Make Fall Risk Sign visible to staff  - Offer Toileting every 2 Hours, in advance of need  - Initiate/Maintain bed alarm  - Apply yellow socks and bracelet for high fall risk patients  - Consider moving patient to room near nurses station  Outcome: Progressing     Problem: SAFETY ADULT  Goal: Maintain or return to baseline ADL function  Description: INTERVENTIONS:  -  Assess patient's ability to carry out ADLs; assess patient's baseline for ADL function and identify physical deficits which impact  ability to perform ADLs (bathing, care of mouth/teeth, toileting, grooming, dressing, etc.)  - Assess/evaluate cause of self-care deficits   - Assess range of motion  - Assess patient's mobility; develop plan if impaired  - Assess patient's need for assistive devices and provide as appropriate  - Encourage maximum independence but intervene and supervise when necessary  - Involve family in performance of ADLs  - Assess for home care needs following discharge   - Consider OT consult to assist with ADL evaluation and planning for discharge  - Provide patient education as appropriate  Outcome: Progressing     Problem: Knowledge Deficit  Goal: Patient/family/caregiver demonstrates understanding of disease process, treatment plan, medications, and discharge instructions  Description: Complete learning assessment and assess knowledge base.  Interventions:  - Provide teaching at level of understanding  - Provide teaching via preferred learning methods  Outcome: Progressing

## 2024-02-14 NOTE — DISCHARGE INSTR - AVS FIRST PAGE
Continue Metoprolol at new dose of 50 mg twice daily   Follow up with PCP about hypertension   Continue taking Magnesium Oxide 400 mg twice daily   Continue taking Protonix 40 mg twice daily   Start taking Maalox daily for indigestion   Repeat lab work in 1 week (CMP, CBC, Magnesium)   Follow up with Cardiology outpatient   Follow up with Nephrology outpatient - discuss CT findings

## 2024-02-14 NOTE — INCIDENTAL FINDINGS
Incidental findings noted on CT     CT chest abdomen pelvis w contrast: Chest:, Interval callus formation in the previously fractured posterior right 10th and 11th rib fractures with mild worsening of the fracture displacement suspicious for acute on chronic process if there is supporting clinical findings., Abdomen and pelvis:, 1. No acute abdominopelvic CT abnormality., 2. Hepatic steatosis., 3. Bilateral extrarenal and intrarenal vascular calcification. Tiny right kidney upper pole nonobstructing calculus., 4. Left greater than right bilateral adrenal nodules are stable from 10/17/2019 . Although its imaging features on this study is indeterminate, because this lesion has been stable for > 1 year, it is considered benign. However, please note that for , adrenal nodule > 1 cm, biochemical evaluation is suggested to rule out functioning adenoma, if not already performed., 5. Stable multifocal abdominal aortic ectasia., Adrenal recommendation based on institutional consensus and Journal of American College of Radiology 2017;14:6313-8508.    Advised patient to follow up with nephrologist outpatient on discharge.     Lois Kitchen DO  Family Medicine, PGY-2  02/14/24  12:17 PM

## 2024-02-16 DIAGNOSIS — K21.9 GASTROESOPHAGEAL REFLUX DISEASE, UNSPECIFIED WHETHER ESOPHAGITIS PRESENT: ICD-10-CM

## 2024-02-16 DIAGNOSIS — G95.19 EDEMA, SPINAL CORD (HCC): ICD-10-CM

## 2024-02-16 RX ORDER — GABAPENTIN 300 MG/1
CAPSULE ORAL
Qty: 90 CAPSULE | Refills: 1 | Status: SHIPPED | OUTPATIENT
Start: 2024-02-16

## 2024-02-16 RX ORDER — PANTOPRAZOLE SODIUM 40 MG/1
TABLET, DELAYED RELEASE ORAL
Qty: 60 TABLET | Refills: 1 | Status: SHIPPED | OUTPATIENT
Start: 2024-02-16

## 2024-02-21 PROBLEM — B96.89 BACTERIAL CONJUNCTIVITIS OF BOTH EYES: Status: RESOLVED | Noted: 2024-01-24 | Resolved: 2024-02-21

## 2024-02-21 PROBLEM — H10.9 BACTERIAL CONJUNCTIVITIS OF BOTH EYES: Status: RESOLVED | Noted: 2024-01-24 | Resolved: 2024-02-21

## 2024-02-24 DIAGNOSIS — F10.10 ALCOHOL ABUSE: Chronic | ICD-10-CM

## 2024-02-26 ENCOUNTER — TELEPHONE (OUTPATIENT)
Dept: NEPHROLOGY | Facility: CLINIC | Age: 64
End: 2024-02-26

## 2024-02-26 RX ORDER — FOLIC ACID 1 MG/1
TABLET ORAL
Qty: 90 TABLET | Refills: 2 | Status: SHIPPED | OUTPATIENT
Start: 2024-02-26

## 2024-02-26 NOTE — TELEPHONE ENCOUNTER
Patient scheduled for follow up 4/15. Patient states he is not able to take salt tablets as they make him vomit. He also says that while in the hospital recently he was told not to drink Ensure. He would like recommendations for different medication from Dr. Dugan

## 2024-02-28 NOTE — TELEPHONE ENCOUNTER
I called the patient but he did not answer and I was unable to leave a message. Please call him and have him try a lower dose of salt tabs - 1 gm three times per day. Unfortunately, there are no alternatives available except for urea tablets. If he wants to try them, he can go on [email or web address] and order Urea tabs. He can take 1 Urea tablet three times a day. I see no contraindication to the Ensure at this time. Does he recall why this was not advised? Once he lowers the salt tablets or starts urea tablets, please have him go for a BMP in 2 weeks. His sodium level in the hospital recently was not that low and he may be okay taking the lower dose of salt tabs.

## 2024-02-29 NOTE — TELEPHONE ENCOUNTER
Talked with pt.  He has D/C salt tablets as they make him sick to the stomach (he had this problem previously as well).  Per Dr. Dugan, ok to stop salt tablets and to drink Ensure BID.  Repeat blood work in two weeks.  (Pt states he has lab slips from the hospital and will use those)

## 2024-03-06 ENCOUNTER — HOSPITAL ENCOUNTER (INPATIENT)
Facility: HOSPITAL | Age: 64
LOS: 1 days | Discharge: HOME | DRG: 897 | End: 2024-03-09
Attending: EMERGENCY MEDICINE | Admitting: FAMILY MEDICINE
Payer: COMMERCIAL

## 2024-03-06 DIAGNOSIS — F33.9 DEPRESSION, RECURRENT (HCC): ICD-10-CM

## 2024-03-06 DIAGNOSIS — E83.42 HYPOMAGNESEMIA: ICD-10-CM

## 2024-03-06 DIAGNOSIS — F10.939 ALCOHOL WITHDRAWAL (HCC): ICD-10-CM

## 2024-03-06 DIAGNOSIS — F10.131: ICD-10-CM

## 2024-03-06 DIAGNOSIS — F10.920 ALCOHOLIC INTOXICATION WITHOUT COMPLICATION (HCC): Primary | ICD-10-CM

## 2024-03-06 PROCEDURE — 99284 EMERGENCY DEPT VISIT MOD MDM: CPT

## 2024-03-06 PROCEDURE — 99291 CRITICAL CARE FIRST HOUR: CPT | Performed by: STUDENT IN AN ORGANIZED HEALTH CARE EDUCATION/TRAINING PROGRAM

## 2024-03-06 RX ORDER — ONDANSETRON 4 MG/1
4 TABLET, ORALLY DISINTEGRATING ORAL ONCE
Status: COMPLETED | OUTPATIENT
Start: 2024-03-06 | End: 2024-03-06

## 2024-03-06 RX ADMIN — ONDANSETRON 4 MG: 4 TABLET, ORALLY DISINTEGRATING ORAL at 22:23

## 2024-03-06 NOTE — Clinical Note
Case was discussed with  and the patient's admission status was agreed to be Admission Status: observation status to the service of Dr. Subramanian

## 2024-03-07 LAB
ALBUMIN SERPL BCP-MCNC: 4 G/DL (ref 3.5–5)
ALP SERPL-CCNC: 74 U/L (ref 34–104)
ALT SERPL W P-5'-P-CCNC: 41 U/L (ref 7–52)
ANION GAP SERPL CALCULATED.3IONS-SCNC: 12 MMOL/L
AST SERPL W P-5'-P-CCNC: 62 U/L (ref 13–39)
BASOPHILS # BLD MANUAL: 0 THOUSAND/UL (ref 0–0.1)
BASOPHILS NFR MAR MANUAL: 0 % (ref 0–1)
BILIRUB SERPL-MCNC: 0.85 MG/DL (ref 0.2–1)
BUN SERPL-MCNC: 24 MG/DL (ref 5–25)
CALCIUM SERPL-MCNC: 8.1 MG/DL (ref 8.4–10.2)
CHLORIDE SERPL-SCNC: 98 MMOL/L (ref 96–108)
CO2 SERPL-SCNC: 26 MMOL/L (ref 21–32)
CREAT SERPL-MCNC: 1.68 MG/DL (ref 0.6–1.3)
EOSINOPHIL # BLD MANUAL: 0.23 THOUSAND/UL (ref 0–0.4)
EOSINOPHIL NFR BLD MANUAL: 5 % (ref 0–6)
ERYTHROCYTE [DISTWIDTH] IN BLOOD BY AUTOMATED COUNT: 15.1 % (ref 11.6–15.1)
ETHANOL SERPL-MCNC: 91 MG/DL
GFR SERPL CREATININE-BSD FRML MDRD: 43 ML/MIN/1.73SQ M
GLUCOSE SERPL-MCNC: 102 MG/DL (ref 65–140)
GLUCOSE SERPL-MCNC: 108 MG/DL (ref 65–140)
GLUCOSE SERPL-MCNC: 123 MG/DL (ref 65–140)
GLUCOSE SERPL-MCNC: 92 MG/DL (ref 65–140)
HCT VFR BLD AUTO: 37.8 % (ref 36.5–49.3)
HGB BLD-MCNC: 13.1 G/DL (ref 12–17)
LYMPHOCYTES # BLD AUTO: 1.15 THOUSAND/UL (ref 0.6–4.47)
LYMPHOCYTES # BLD AUTO: 23 % (ref 14–44)
MAGNESIUM SERPL-MCNC: 1.3 MG/DL (ref 1.9–2.7)
MCH RBC QN AUTO: 33.2 PG (ref 26.8–34.3)
MCHC RBC AUTO-ENTMCNC: 34.7 G/DL (ref 31.4–37.4)
MCV RBC AUTO: 96 FL (ref 82–98)
MONOCYTES # BLD AUTO: 0.46 THOUSAND/UL (ref 0–1.22)
MONOCYTES NFR BLD: 10 % (ref 4–12)
NEUTROPHILS # BLD MANUAL: 2.75 THOUSAND/UL (ref 1.85–7.62)
NEUTS BAND NFR BLD MANUAL: 3 % (ref 0–8)
NEUTS SEG NFR BLD AUTO: 57 % (ref 43–75)
PLATELET # BLD AUTO: 97 THOUSANDS/UL (ref 149–390)
PLATELET BLD QL SMEAR: ABNORMAL
PMV BLD AUTO: 10.3 FL (ref 8.9–12.7)
POTASSIUM SERPL-SCNC: 4.2 MMOL/L (ref 3.5–5.3)
PROT SERPL-MCNC: 7.2 G/DL (ref 6.4–8.4)
RBC # BLD AUTO: 3.95 MILLION/UL (ref 3.88–5.62)
RBC MORPH BLD: NORMAL
SODIUM SERPL-SCNC: 136 MMOL/L (ref 135–147)
VARIANT LYMPHS # BLD AUTO: 2 %
WBC # BLD AUTO: 4.58 THOUSAND/UL (ref 4.31–10.16)

## 2024-03-07 PROCEDURE — 85007 BL SMEAR W/DIFF WBC COUNT: CPT | Performed by: STUDENT IN AN ORGANIZED HEALTH CARE EDUCATION/TRAINING PROGRAM

## 2024-03-07 PROCEDURE — 94660 CPAP INITIATION&MGMT: CPT

## 2024-03-07 PROCEDURE — 96361 HYDRATE IV INFUSION ADD-ON: CPT

## 2024-03-07 PROCEDURE — 82948 REAGENT STRIP/BLOOD GLUCOSE: CPT

## 2024-03-07 PROCEDURE — 94760 N-INVAS EAR/PLS OXIMETRY 1: CPT

## 2024-03-07 PROCEDURE — 80053 COMPREHEN METABOLIC PANEL: CPT | Performed by: STUDENT IN AN ORGANIZED HEALTH CARE EDUCATION/TRAINING PROGRAM

## 2024-03-07 PROCEDURE — 96374 THER/PROPH/DIAG INJ IV PUSH: CPT

## 2024-03-07 PROCEDURE — 99223 1ST HOSP IP/OBS HIGH 75: CPT | Performed by: FAMILY MEDICINE

## 2024-03-07 PROCEDURE — 96375 TX/PRO/DX INJ NEW DRUG ADDON: CPT

## 2024-03-07 PROCEDURE — 36415 COLL VENOUS BLD VENIPUNCTURE: CPT | Performed by: STUDENT IN AN ORGANIZED HEALTH CARE EDUCATION/TRAINING PROGRAM

## 2024-03-07 PROCEDURE — 83735 ASSAY OF MAGNESIUM: CPT | Performed by: STUDENT IN AN ORGANIZED HEALTH CARE EDUCATION/TRAINING PROGRAM

## 2024-03-07 PROCEDURE — 82077 ASSAY SPEC XCP UR&BREATH IA: CPT | Performed by: STUDENT IN AN ORGANIZED HEALTH CARE EDUCATION/TRAINING PROGRAM

## 2024-03-07 PROCEDURE — 85027 COMPLETE CBC AUTOMATED: CPT | Performed by: STUDENT IN AN ORGANIZED HEALTH CARE EDUCATION/TRAINING PROGRAM

## 2024-03-07 RX ORDER — METOPROLOL TARTRATE 50 MG/1
50 TABLET, FILM COATED ORAL EVERY 12 HOURS SCHEDULED
Status: DISCONTINUED | OUTPATIENT
Start: 2024-03-07 | End: 2024-03-09 | Stop reason: HOSPADM

## 2024-03-07 RX ORDER — LANOLIN ALCOHOL/MO/W.PET/CERES
100 CREAM (GRAM) TOPICAL DAILY
Status: DISCONTINUED | OUTPATIENT
Start: 2024-03-07 | End: 2024-03-09 | Stop reason: HOSPADM

## 2024-03-07 RX ORDER — LANOLIN ALCOHOL/MO/W.PET/CERES
400 CREAM (GRAM) TOPICAL 2 TIMES DAILY
Status: DISCONTINUED | OUTPATIENT
Start: 2024-03-07 | End: 2024-03-07

## 2024-03-07 RX ORDER — DIAZEPAM 5 MG/ML
10 INJECTION, SOLUTION INTRAMUSCULAR; INTRAVENOUS ONCE
Status: COMPLETED | OUTPATIENT
Start: 2024-03-07 | End: 2024-03-07

## 2024-03-07 RX ORDER — LANOLIN ALCOHOL/MO/W.PET/CERES
400 CREAM (GRAM) TOPICAL 2 TIMES DAILY
Status: DISCONTINUED | OUTPATIENT
Start: 2024-03-08 | End: 2024-03-09 | Stop reason: HOSPADM

## 2024-03-07 RX ORDER — LORAZEPAM 1 MG/1
2 TABLET ORAL ONCE
Status: COMPLETED | OUTPATIENT
Start: 2024-03-07 | End: 2024-03-07

## 2024-03-07 RX ORDER — FOLIC ACID 1 MG/1
1000 TABLET ORAL DAILY
Status: DISCONTINUED | OUTPATIENT
Start: 2024-03-07 | End: 2024-03-09 | Stop reason: HOSPADM

## 2024-03-07 RX ORDER — ACETAMINOPHEN 325 MG/1
650 TABLET ORAL EVERY 6 HOURS PRN
Status: DISCONTINUED | OUTPATIENT
Start: 2024-03-07 | End: 2024-03-09 | Stop reason: HOSPADM

## 2024-03-07 RX ORDER — MAGNESIUM SULFATE HEPTAHYDRATE 40 MG/ML
2 INJECTION, SOLUTION INTRAVENOUS ONCE
Status: COMPLETED | OUTPATIENT
Start: 2024-03-07 | End: 2024-03-07

## 2024-03-07 RX ORDER — PANTOPRAZOLE SODIUM 40 MG/1
40 TABLET, DELAYED RELEASE ORAL
Status: DISCONTINUED | OUTPATIENT
Start: 2024-03-07 | End: 2024-03-09 | Stop reason: HOSPADM

## 2024-03-07 RX ORDER — TAMSULOSIN HYDROCHLORIDE 0.4 MG/1
0.4 CAPSULE ORAL DAILY
Status: DISCONTINUED | OUTPATIENT
Start: 2024-03-07 | End: 2024-03-09 | Stop reason: HOSPADM

## 2024-03-07 RX ORDER — SODIUM CHLORIDE 9 MG/ML
50 INJECTION, SOLUTION INTRAVENOUS CONTINUOUS
Status: DISCONTINUED | OUTPATIENT
Start: 2024-03-07 | End: 2024-03-09

## 2024-03-07 RX ORDER — INSULIN LISPRO 100 [IU]/ML
1-5 INJECTION, SOLUTION INTRAVENOUS; SUBCUTANEOUS
Status: DISCONTINUED | OUTPATIENT
Start: 2024-03-07 | End: 2024-03-09 | Stop reason: HOSPADM

## 2024-03-07 RX ORDER — FERROUS SULFATE 325(65) MG
325 TABLET ORAL
Status: DISCONTINUED | OUTPATIENT
Start: 2024-03-08 | End: 2024-03-09 | Stop reason: HOSPADM

## 2024-03-07 RX ORDER — NICOTINE 21 MG/24HR
1 PATCH, TRANSDERMAL 24 HOURS TRANSDERMAL DAILY
Status: DISCONTINUED | OUTPATIENT
Start: 2024-03-07 | End: 2024-03-09 | Stop reason: HOSPADM

## 2024-03-07 RX ORDER — FLUTICASONE FUROATE AND VILANTEROL 200; 25 UG/1; UG/1
1 POWDER RESPIRATORY (INHALATION) DAILY
Status: DISCONTINUED | OUTPATIENT
Start: 2024-03-07 | End: 2024-03-09 | Stop reason: HOSPADM

## 2024-03-07 RX ORDER — GABAPENTIN 300 MG/1
300 CAPSULE ORAL 3 TIMES DAILY
Status: DISCONTINUED | OUTPATIENT
Start: 2024-03-07 | End: 2024-03-09 | Stop reason: HOSPADM

## 2024-03-07 RX ORDER — RANOLAZINE 500 MG/1
500 TABLET, EXTENDED RELEASE ORAL EVERY 12 HOURS
Status: DISCONTINUED | OUTPATIENT
Start: 2024-03-07 | End: 2024-03-09 | Stop reason: HOSPADM

## 2024-03-07 RX ORDER — VARENICLINE TARTRATE 0.5 MG/1
1 TABLET, FILM COATED ORAL 2 TIMES DAILY
Status: DISCONTINUED | OUTPATIENT
Start: 2024-03-07 | End: 2024-03-09 | Stop reason: HOSPADM

## 2024-03-07 RX ORDER — LISINOPRIL 10 MG/1
10 TABLET ORAL DAILY
Status: DISCONTINUED | OUTPATIENT
Start: 2024-03-07 | End: 2024-03-07

## 2024-03-07 RX ORDER — PRAVASTATIN SODIUM 40 MG
40 TABLET ORAL
Status: DISCONTINUED | OUTPATIENT
Start: 2024-03-07 | End: 2024-03-09 | Stop reason: HOSPADM

## 2024-03-07 RX ORDER — ALBUTEROL SULFATE 90 UG/1
2 AEROSOL, METERED RESPIRATORY (INHALATION) EVERY 4 HOURS PRN
Status: DISCONTINUED | OUTPATIENT
Start: 2024-03-07 | End: 2024-03-09 | Stop reason: HOSPADM

## 2024-03-07 RX ORDER — ASPIRIN 81 MG/1
81 TABLET, CHEWABLE ORAL DAILY
Status: DISCONTINUED | OUTPATIENT
Start: 2024-03-07 | End: 2024-03-09 | Stop reason: HOSPADM

## 2024-03-07 RX ORDER — AMOXICILLIN 250 MG
1 CAPSULE ORAL DAILY PRN
Status: DISCONTINUED | OUTPATIENT
Start: 2024-03-07 | End: 2024-03-09 | Stop reason: HOSPADM

## 2024-03-07 RX ORDER — LORAZEPAM 2 MG/ML
1 INJECTION INTRAMUSCULAR ONCE
Status: COMPLETED | OUTPATIENT
Start: 2024-03-07 | End: 2024-03-07

## 2024-03-07 RX ADMIN — MAGNESIUM SULFATE HEPTAHYDRATE 2 G: 40 INJECTION, SOLUTION INTRAVENOUS at 15:13

## 2024-03-07 RX ADMIN — PRAVASTATIN SODIUM 40 MG: 40 TABLET ORAL at 15:30

## 2024-03-07 RX ADMIN — NICOTINE 1 PATCH: 21 PATCH, EXTENDED RELEASE TRANSDERMAL at 13:46

## 2024-03-07 RX ADMIN — LORAZEPAM 2 MG: 1 TABLET ORAL at 14:12

## 2024-03-07 RX ADMIN — GABAPENTIN 300 MG: 300 CAPSULE ORAL at 20:36

## 2024-03-07 RX ADMIN — LISINOPRIL 10 MG: 10 TABLET ORAL at 13:46

## 2024-03-07 RX ADMIN — FLUTICASONE FUROATE AND VILANTEROL TRIFENATATE 1 PUFF: 200; 25 POWDER RESPIRATORY (INHALATION) at 13:48

## 2024-03-07 RX ADMIN — METOPROLOL TARTRATE 50 MG: 50 TABLET, FILM COATED ORAL at 13:46

## 2024-03-07 RX ADMIN — TAMSULOSIN HYDROCHLORIDE 0.4 MG: 0.4 CAPSULE ORAL at 13:46

## 2024-03-07 RX ADMIN — VARENICLINE 1 MG: 0.5 TABLET, FILM COATED ORAL at 13:49

## 2024-03-07 RX ADMIN — Medication 1 TABLET: at 13:47

## 2024-03-07 RX ADMIN — LORAZEPAM 2 MG: 1 TABLET ORAL at 20:44

## 2024-03-07 RX ADMIN — PANTOPRAZOLE SODIUM 40 MG: 40 TABLET, DELAYED RELEASE ORAL at 15:12

## 2024-03-07 RX ADMIN — FOLIC ACID 1000 MCG: 1 TABLET ORAL at 13:46

## 2024-03-07 RX ADMIN — ACETAMINOPHEN 650 MG: 325 TABLET ORAL at 20:36

## 2024-03-07 RX ADMIN — SODIUM CHLORIDE 1000 ML: 0.9 INJECTION, SOLUTION INTRAVENOUS at 09:39

## 2024-03-07 RX ADMIN — APIXABAN 5 MG: 5 TABLET, FILM COATED ORAL at 13:46

## 2024-03-07 RX ADMIN — ASPIRIN 81 MG 81 MG: 81 TABLET ORAL at 13:47

## 2024-03-07 RX ADMIN — RANOLAZINE 500 MG: 500 TABLET, FILM COATED, EXTENDED RELEASE ORAL at 13:46

## 2024-03-07 RX ADMIN — Medication 400 MG: at 13:46

## 2024-03-07 RX ADMIN — APIXABAN 5 MG: 5 TABLET, FILM COATED ORAL at 17:24

## 2024-03-07 RX ADMIN — LORAZEPAM 1 MG: 2 INJECTION INTRAMUSCULAR; INTRAVENOUS at 09:36

## 2024-03-07 RX ADMIN — THIAMINE HCL TAB 100 MG 100 MG: 100 TAB at 13:46

## 2024-03-07 RX ADMIN — DIAZEPAM 10 MG: 10 INJECTION, SOLUTION INTRAMUSCULAR; INTRAVENOUS at 11:07

## 2024-03-07 RX ADMIN — MAGNESIUM SULFATE HEPTAHYDRATE 2 G: 40 INJECTION, SOLUTION INTRAVENOUS at 11:09

## 2024-03-07 RX ADMIN — SODIUM CHLORIDE 50 ML/HR: 0.9 INJECTION, SOLUTION INTRAVENOUS at 13:46

## 2024-03-07 RX ADMIN — GABAPENTIN 300 MG: 300 CAPSULE ORAL at 15:12

## 2024-03-07 RX ADMIN — VARENICLINE 1 MG: 0.5 TABLET, FILM COATED ORAL at 17:24

## 2024-03-07 NOTE — NURSING NOTE
Requested telemetry and oxygen order via TT to MD Connors. Patient HR elevated above 130s and O2 sats dip to high 80's intemittently. Pt. In no distress and not symptomatic.

## 2024-03-07 NOTE — ED NOTES
3/7/24 @ 0705:  Nan from [facility] called and reports that she will arrive around 0830.  MS Jay    0830: Nan from [facility] arrived and is meeting with patient to discuss treatment options for alcohol abuse.  PES provided Nan with facesheet.  MS Jay    0915: Nan from [facility] reports that patient is going to be admitted at this hospital for detox.  MS Jay

## 2024-03-07 NOTE — ED PROVIDER NOTES
History  Chief Complaint   Patient presents with    Alcohol Intoxication     Pt reports calling ems due to not feeling well after drinking alcohol. Pt voluntary and calm and cooperative at this time. Admits to drinking a quart of vodka and a couple beers today. Pt requesting OORP.     63-year-old male past medical history significant for alcohol abuse presenting to ED today for alcohol intoxication.  Patient called EMS himself because he felt not well after drinking alcohol.  He says that he drank too much today.  He says that he drank a whole quart of vodka as well as a couple beers.  He was requesting ORP services.  He was also feeling nauseous.  He says he drinks daily.  When asked if he wanted to cut back he really did not give me an answer but was asking for services as an outpatient.        Prior to Admission Medications   Prescriptions Last Dose Informant Patient Reported? Taking?   Blood Glucose Monitoring Suppl (ONE TOUCH ULTRA MINI) w/Device KIT  Self Yes No   Sig: Use as directed   Blood Pressure Monitoring (Adult Blood Pressure Cuff Lg) KIT   No No   Sig: Use in the morning   Breo Ellipta 200-25 MCG/ACT inhaler  Self No No   Sig: Inhale 1 puff daily Rinse mouth after use.   ONETOUCH DELICA LANCETS FINE MISC  Self Yes No   Sig: 3 (three) times a day Test   Thiamine HCl (vitamin B-1) 100 MG TABS  Self No No   Sig: TAKE 1 TABLET DAILY   albuterol (Ventolin HFA) 90 mcg/act inhaler  Self No No   Sig: Inhale 2 puffs every 4 (four) hours as needed for wheezing   aluminum-magnesium hydroxide-simethicone (MAALOX) 0329-1121-765 mg/30 mL suspension   No No   Sig: Take 30 mL by mouth every 4 (four) hours as needed for indigestion or heartburn   apixaban (ELIQUIS) 5 mg   No No   Sig: Take 1 tablet (5 mg total) by mouth 2 (two) times a day Do not start before January 31, 2024.   aspirin (ECOTRIN LOW STRENGTH) 81 mg EC tablet  Self Yes No   Sig: Take 81 mg by mouth daily   ferrous sulfate 325 (65 Fe) mg tablet  Self No  No   Sig: Take 1 tablet (325 mg total) by mouth daily with breakfast   folic acid (FOLVITE) 1 mg tablet   No No   Sig: TAKE 1 TABLET DAILY   gabapentin (NEURONTIN) 300 mg capsule   No No   Sig: TAKE ONE CAPSULE THREE TIMES DAILY   lisinopril (ZESTRIL) 20 mg tablet  Self No No   Sig: Take 0.5 tablets (10 mg total) by mouth daily   magnesium Oxide (MAG-OX) 400 mg TABS   No No   Sig: Take 1 tablet (400 mg total) by mouth 2 (two) times a day   metFORMIN (GLUCOPHAGE) 500 mg tablet  Self No No   Sig: Take 1 tablet (500 mg total) by mouth daily with breakfast Do not start before September 23, 2023.   metoprolol tartrate (LOPRESSOR) 50 mg tablet   No No   Sig: Take 1 tablet (50 mg total) by mouth every 12 (twelve) hours   naltrexone (REVIA) 50 mg tablet  Self No No   Sig: Take 1 tablet (50 mg total) by mouth daily   nicotine (NICODERM CQ) 21 mg/24 hr TD 24 hr patch  Self No No   Sig: Place 1 patch on the skin over 24 hours daily Do not start before December 4, 2023.   pantoprazole (PROTONIX) 40 mg tablet   No No   Sig: TAKE 1 TABLET TWO TIMES A DAY   pravastatin (PRAVACHOL) 40 mg tablet  Self No No   Sig: TAKE 1 TABLET DAILY WITH DINNER   ranolazine (RANEXA) 500 mg 12 hr tablet  Self No No   Sig: TAKE 1 TABLET EVERY 12 HOURS   senna-docusate sodium (SENOKOT S) 8.6-50 mg per tablet   No No   Sig: Take 1 tablet by mouth daily as needed for constipation   tamsulosin (FLOMAX) 0.4 mg  Self No No   Sig: TAKE 1 CAPSULE DAILY   varenicline (CHANTIX) 1 mg tablet   No No   Sig: TAKE 1 TABLET 2 TIMES A DAY      Facility-Administered Medications: None       Past Medical History:   Diagnosis Date    Acute on chronic kidney failure      Alcohol withdrawal (HCC) 06/07/2019    Atrial fibrillation (HCC)     Cancer (HCC)     prostate ca,had radiation    Cardiac disease     stents,then triple bypass    COPD (chronic obstructive pulmonary disease) (HCC)     Coronary artery disease     ETOH abuse     Heart failure (HCC)     History of heart  surgery     says [facility]    Hx of heart artery stent     2014    Hyperlipidemia     Hypertension     Hypovolemic shock ([facility]) 12/22/2019    Lumbar spondylitis ([facility]) 10/13/2022    Nasal bone fracture 10/10/2022    Prostate CA ([facility])     S/P CABG x 3     2004    Sleep apnea        Past Surgical History:   Procedure Laterality Date    CARDIAC CATHETERIZATION      2 stents    CORONARY ARTERY BYPASS GRAFT  2004    NC ARTHRD ANT INTERBODY MIN DSC CRV BELOW C2 N/A 12/16/2020    Procedure: Anterior cervical discectomy with fusion C4-C7; Posterior cervical decompression and fusion C2-T2;  Surgeon: David Rowell MD;  Location: [facility];  Service: Neurosurgery    TONSILLECTOMY         Family History   Problem Relation Age of Onset    Diabetes Mother     Uterine cancer Mother     COPD Father     Hypertension Father      I have reviewed and agree with the history as documented.    E-Cigarette/Vaping    E-Cigarette Use Never User      E-Cigarette/Vaping Substances    Nicotine Yes     THC No     CBD No     Flavoring No     Other No     Unknown No      Social History     Tobacco Use    Smoking status: Every Day     Current packs/day: 1.50     Average packs/day: 1.5 packs/day for 40.0 years (60.0 ttl pk-yrs)     Types: Cigarettes    Smokeless tobacco: Never   Vaping Use    Vaping status: Never Used   Substance Use Topics    Alcohol use: Yes     Alcohol/week: 4.0 standard drinks of alcohol     Types: 4 Standard drinks or equivalent per week     Comment: quart of vodka daily    Drug use: No       Review of Systems   Constitutional:  Negative for chills and fever.   HENT:  Negative for hearing loss.    Eyes:  Negative for visual disturbance.   Respiratory:  Negative for shortness of breath.    Cardiovascular:  Negative for chest pain.   Gastrointestinal:  Positive for nausea. Negative for abdominal pain, constipation, diarrhea and vomiting.   Genitourinary:  Negative for difficulty urinating.   Musculoskeletal:   Negative for myalgias.   Skin:  Negative for rash.   Neurological:  Negative for dizziness.   Psychiatric/Behavioral:  Negative for agitation.    All other systems reviewed and are negative.      Physical Exam  Physical Exam  Vitals and nursing note reviewed.   Constitutional:       General: He is not in acute distress.     Appearance: Normal appearance. He is not ill-appearing.   HENT:      Head: Normocephalic and atraumatic.      Right Ear: External ear normal.      Left Ear: External ear normal.      Nose: Nose normal.      Mouth/Throat:      Mouth: Mucous membranes are moist.      Pharynx: Oropharynx is clear. No oropharyngeal exudate.   Eyes:      Extraocular Movements: Extraocular movements intact.      Conjunctiva/sclera: Conjunctivae normal.      Pupils: Pupils are equal, round, and reactive to light.   Cardiovascular:      Rate and Rhythm: Normal rate and regular rhythm.      Pulses: Normal pulses.      Heart sounds: Normal heart sounds.   Pulmonary:      Effort: Pulmonary effort is normal. No respiratory distress.      Breath sounds: Normal breath sounds. No wheezing.   Abdominal:      General: Abdomen is flat. Bowel sounds are normal. There is no distension.      Palpations: Abdomen is soft.      Tenderness: There is no abdominal tenderness.   Musculoskeletal:         General: No swelling. Normal range of motion.      Cervical back: Normal range of motion.   Skin:     General: Skin is warm and dry.      Capillary Refill: Capillary refill takes less than 2 seconds.   Neurological:      General: No focal deficit present.      Mental Status: He is alert and oriented to person, place, and time. Mental status is at baseline.      Cranial Nerves: No cranial nerve deficit.      Sensory: No sensory deficit.      Motor: No weakness.      Gait: Gait normal.      Comments: Alert and oriented x 4.  Clinically sober.   Psychiatric:         Mood and Affect: Mood normal.         Behavior: Behavior normal.         Vital  Signs  ED Triage Vitals [03/06/24 2219]   Temperature Pulse Respirations Blood Pressure SpO2   (!) 97 °F (36.1 °C) 75 18 110/60 98 %      Temp Source Heart Rate Source Patient Position - Orthostatic VS BP Location FiO2 (%)   Tympanic Monitor -- -- --      Pain Score       --           Vitals:    03/06/24 2219   BP: 110/60   Pulse: 75         Visual Acuity      ED Medications  Medications   ondansetron (ZOFRAN-ODT) dispersible tablet 4 mg (4 mg Oral Given 3/6/24 2223)       Diagnostic Studies  Results Reviewed       None                   No orders to display              Procedures  Procedures         ED Course                               SBIRT 22yo+      Flowsheet Row Most Recent Value   Initial Alcohol Screen: US AUDIT-C     1. How often do you have a drink containing alcohol? 6 Filed at: 03/06/2024 2221   2. How many drinks containing alcohol do you have on a typical day you are drinking?  --  [1/5 bottle of vodka] Filed at: 03/06/2024 2221   Audit-C Score 6 Filed at: 03/06/2024 2221                      Medical Decision Making  63-year-old male presenting to ED today with alcohol intoxication.  At this time he is clinically sober.  No signs of trauma.  Will place ED warm handoff referral for ORP services.  Will give him ODT Zofran.    Patient is been sleeping ever since he came in and in no acute distress.  Will plan to discharge after ORP services.    Risk  Prescription drug management.             Disposition  Final diagnoses:   Alcoholic intoxication without complication (HCC)     Time reflects when diagnosis was documented in both MDM as applicable and the Disposition within this note       Time User Action Codes Description Comment    3/7/2024  1:04 AM Seven Severino Add [F10.920] Alcoholic intoxication without complication (HCC)           ED Disposition       ED Disposition   Discharge    Condition   Stable    Date/Time   Thu Mar 7, 2024 0110    Comment   Gregg Partida discharge to home/self care.                    Follow-up Information       Follow up With Specialties Details Why Contact Info Additional Information    [facility] Family Medicine Schedule an appointment as soon as possible for a visit  for follow up [address]  [facility] [address] [phone] [facility], [address], [place], [place], [id number] [phone]    [facility] Emergency Department Emergency Medicine Go to  If symptoms worsen, As needed [address] [facility] [address] [phone] [facility] Emergency Department, [address] [place], [place], [address]            Patient's Medications   Discharge Prescriptions    No medications on file       No discharge procedures on file.    PDMP Review         Value Time User    PDMP Reviewed  Yes 1/24/2024  9:23 AM Tyrone Freire DO            ED Provider  Electronically Signed by             Seven Severino MD  03/07/24 0320

## 2024-03-07 NOTE — DISCHARGE INSTRUCTIONS
Please follow-up with your primary care doctor.  They may help you to discuss cutting back on your alcohol use.

## 2024-03-07 NOTE — ED CARE HANDOFF
Emergency Department Sign Out Note        Sign out and transfer of care from Dr Severino. See Separate Emergency Department note.     The patient, Gregg Partida, was evaluated by the previous provider for alcohol intoxication, requesting rehab.    Workup Completed:  ONUR consult    ED Course / Workup Pending (followup):  Pending ONUR zhu                        Physical Exam  Vitals and nursing note reviewed.   Constitutional:       General: He is not in acute distress.     Appearance: He is well-developed. He is not ill-appearing, toxic-appearing or diaphoretic.   HENT:      Head: Normocephalic and atraumatic.      Right Ear: External ear normal.      Left Ear: External ear normal.      Nose: Nose normal.   Eyes:      General: Lids are normal. No scleral icterus.  Cardiovascular:      Rate and Rhythm: Regular rhythm. Tachycardia present.   Pulmonary:      Effort: Pulmonary effort is normal. No respiratory distress.   Musculoskeletal:         General: No deformity. Normal range of motion.      Cervical back: Normal range of motion and neck supple.   Skin:     General: Skin is warm and dry.   Neurological:      General: No focal deficit present.      Mental Status: He is alert.      Motor: Tremor present.   Psychiatric:         Mood and Affect: Mood normal.         Behavior: Behavior normal.         ED Course as of 03/07/24 1341   Thu Mar 07, 2024   0700 SO: Alcohol intoxication. Would like ONUR   0930 Discussed with ONUR who evaluated patient. Patient does not want to go to rehab.  Would like detox.  Does not want to be transferred to [facility] detox center.  Would like to be admitted here.     Currently CIWA is elevated. Will check labs. Will discuss with DORINDA.    Will start withdrawal treatment   1108 Discussed with SLIM. Accepts admission     CriticalCare Time    Date/Time: 3/7/2024 1:40 PM    Performed by: Gilberto North DO  Authorized by: Gilberto North DO    Critical care provider statement:      "Critical care time (minutes):  31    Critical care start time:  3/7/2024 7:00 AM    Critical care end time:  3/7/2024 11:08 AM    Critical care time was exclusive of:  Separately billable procedures and treating other patients and teaching time    Critical care was necessary to treat or prevent imminent or life-threatening deterioration of the following conditions:  CNS failure or compromise (alcohol withdrawal, seizures)    Critical care was time spent personally by me on the following activities:  Blood draw for specimens, obtaining history from patient or surrogate, development of treatment plan with patient or surrogate, evaluation of patient's response to treatment, examination of patient, review of old charts, re-evaluation of patient's condition, ordering and review of laboratory studies and ordering and performing treatments and interventions    I assumed direction of critical care for this patient from another provider in my specialty: no      Medical Decision Making  Patient is a 63 y.o. male who presents to the ED for alcohol tox occasion.  Initially requesting rehab but now stating he wants detox.  Does not want to go to [place].  He is tachycardic but otherwise vitals are stable.  On exam he has tremors.    Differential includes but is not limited to: Alcohol withdrawal, alcohol tox occasion    Plan: Labs, alcohol withdrawal treatment, admit                 Portions of the record may have been created with voice recognition software. Occasional wrong word or \"sound a like\" substitutions may have occurred due to the inherent limitations of voice recognition software. Read the chart carefully and recognize, using context, where substitutions have occurred.    Problems Addressed:  Alcohol withdrawal (HCC): acute illness or injury  Alcoholic intoxication without complication (HCC): acute illness or injury  Hypomagnesemia: acute illness or injury    Amount and/or Complexity of Data Reviewed  Labs: " ordered.    Risk  Prescription drug management.  Decision regarding hospitalization.            Disposition  Final diagnoses:   Alcoholic intoxication without complication (HCC)   Hypomagnesemia   Alcohol withdrawal (HCC)     Time reflects when diagnosis was documented in both MDM as applicable and the Disposition within this note       Time User Action Codes Description Comment    3/7/2024  1:04 AM Seven Severino Add [F10.920] Alcoholic intoxication without complication (HCC)     3/7/2024  1:38 PM Gilberto North Add [E83.42] Hypomagnesemia     3/7/2024  1:38 PM Gilberto North Add [F10.939] Alcohol withdrawal (HCC)           ED Disposition       ED Disposition   Admit    Condition   Stable    Date/Time   Thu Mar 7, 2024  1:38 PM    Comment   Case was discussed with Dr Connors and the patient's admission status was agreed to be Admission Status: observation status to the service of Dr. Connors .               Follow-up Information       Follow up With Specialties Details Why Contact Info Additional Information    [facility] Family Medicine Schedule an appointment as soon as possible for a visit  for follow up [address] [id number] [phone] [facility], [address] [place] [address], [address]   [phone]    [facility] Emergency Department Emergency Medicine Go to  If symptoms worsen, As needed [address] [facility] [id number] [phone] [facility] Emergency Department, [address] [place], [place], [address]          Current Discharge Medication List        CONTINUE these medications which have NOT CHANGED    Details   albuterol (Ventolin HFA) 90 mcg/act inhaler Inhale 2 puffs every 4 (four) hours as needed for wheezing  Qty: 18 g, Refills: 0    Comments: Substitution to a formulary equivalent within the same pharmaceutical class is  authorized.  Associated Diagnoses: COPD (chronic obstructive pulmonary disease) ([facility])      aluminum-magnesium hydroxide-simethicone (MAALOX) 0054-5004-302 mg/30 mL suspension Take 30 mL by mouth every 4 (four) hours as needed for indigestion or heartburn  Qty: 769 mL, Refills: 1    Associated Diagnoses: Gastroesophageal reflux disease, unspecified whether esophagitis present      apixaban (ELIQUIS) 5 mg Take 1 tablet (5 mg total) by mouth 2 (two) times a day Do not start before January 31, 2024.  Qty: 60 tablet, Refills: 0    Associated Diagnoses: Paroxysmal atrial fibrillation ([facility])      aspirin (ECOTRIN LOW STRENGTH) 81 mg EC tablet Take 81 mg by mouth daily      Blood Glucose Monitoring Suppl (ONE TOUCH ULTRA MINI) w/Device KIT Use as directed  Refills: 0      Blood Pressure Monitoring (Adult Blood Pressure Cuff Lg) KIT Use in the morning  Qty: 1 kit, Refills: 0    Associated Diagnoses: Hypertension      Breo Ellipta 200-25 MCG/ACT inhaler Inhale 1 puff daily Rinse mouth after use.  Qty: 60 each, Refills: 3    Associated Diagnoses: COPD (chronic obstructive pulmonary disease) ([facility])      ferrous sulfate 325 (65 Fe) mg tablet Take 1 tablet (325 mg total) by mouth daily with breakfast  Qty: 60 tablet, Refills: 5    Associated Diagnoses: Alcohol abuse      folic acid (FOLVITE) 1 mg tablet TAKE 1 TABLET DAILY  Qty: 90 tablet, Refills: 2    Associated Diagnoses: Alcohol abuse      gabapentin (NEURONTIN) 300 mg capsule TAKE ONE CAPSULE THREE TIMES DAILY  Qty: 90 capsule, Refills: 1    Associated Diagnoses: Edema, spinal cord ([facility])      lisinopril (ZESTRIL) 20 mg tablet Take 0.5 tablets (10 mg total) by mouth daily  Qty: 60 tablet, Refills: 0    Associated Diagnoses: Hypertension; Hx of CABG      magnesium Oxide (MAG-OX) 400 mg TABS Take 1 tablet (400 mg total) by mouth 2 (two) times a day  Qty: 60 tablet, Refills: 0    Associated Diagnoses: Alcohol abuse      metFORMIN (GLUCOPHAGE) 500 mg tablet Take 1 tablet (500 mg  total) by mouth daily with breakfast Do not start before September 23, 2023.  Refills: 0    Associated Diagnoses: Type 2 diabetes mellitus with hyperosmolarity without coma, without long-term current use of insulin ([facility])      metoprolol tartrate (LOPRESSOR) 50 mg tablet Take 1 tablet (50 mg total) by mouth every 12 (twelve) hours  Qty: 60 tablet, Refills: 0    Associated Diagnoses: Paroxysmal atrial fibrillation with rapid ventricular response ([facility])      naltrexone (REVIA) 50 mg tablet Take 1 tablet (50 mg total) by mouth daily  Qty: 30 tablet, Refills: 2    Associated Diagnoses: Alcohol intoxication ([facility]); Alcohol abuse      nicotine (NICODERM CQ) 21 mg/24 hr TD 24 hr patch Place 1 patch on the skin over 24 hours daily Do not start before December 4, 2023.  Qty: 28 patch, Refills: 0    Associated Diagnoses: Nicotine abuse      ONETOUCH DELICA LANCETS FINE MISC 3 (three) times a day Test  Refills: 0      pantoprazole (PROTONIX) 40 mg tablet TAKE 1 TABLET TWO TIMES A DAY  Qty: 60 tablet, Refills: 1    Associated Diagnoses: Gastroesophageal reflux disease, unspecified whether esophagitis present      pravastatin (PRAVACHOL) 40 mg tablet TAKE 1 TABLET DAILY WITH DINNER  Qty: 90 tablet, Refills: 2    Associated Diagnoses: Essential (primary) hypertension      ranolazine (RANEXA) 500 mg 12 hr tablet TAKE 1 TABLET EVERY 12 HOURS  Qty: 60 tablet, Refills: 3    Associated Diagnoses: Essential (primary) hypertension      senna-docusate sodium (SENOKOT S) 8.6-50 mg per tablet Take 1 tablet by mouth daily as needed for constipation    Associated Diagnoses: Constipation      tamsulosin (FLOMAX) 0.4 mg TAKE 1 CAPSULE DAILY  Qty: 90 capsule, Refills: 1    Associated Diagnoses: Hx of prostatic malignancy      Thiamine HCl (vitamin B-1) 100 MG TABS TAKE 1 TABLET DAILY  Qty: 90 tablet, Refills: 0    Associated Diagnoses: Alcohol abuse      varenicline (CHANTIX) 1 mg tablet TAKE 1 TABLET 2 TIMES A DAY  Qty: 56 tablet, Refills: 1     Associated Diagnoses: Nicotine abuse           No discharge procedures on file.       ED Provider  Electronically Signed by     Gilberto North DO  03/07/24 3947

## 2024-03-07 NOTE — PLAN OF CARE
Problem: PAIN - ADULT  Goal: Verbalizes/displays adequate comfort level or baseline comfort level  Description: Interventions:  - Encourage patient to monitor pain and request assistance  - Assess pain using appropriate pain scale  - Administer analgesics based on type and severity of pain and evaluate response  - Implement non-pharmacological measures as appropriate and evaluate response  - Consider cultural and social influences on pain and pain management  - Notify physician/advanced practitioner if interventions unsuccessful or patient reports new pain  Outcome: Progressing     Problem: SAFETY ADULT  Goal: Patient will remain free of falls  Description: INTERVENTIONS:  - Educate patient/family on patient safety including physical limitations  - Instruct patient to call for assistance with activity   - Consult OT/PT to assist with strengthening/mobility   - Keep Call bell within reach  - Keep bed low and locked with side rails adjusted as appropriate  - Keep care items and personal belongings within reach  - Initiate and maintain comfort rounds  - Make Fall Risk Sign visible to staff  - Offer Toileting every 2 Hours, in advance of need  - Initiate/Maintain bed alarm  - Obtain necessary fall risk management equipment: socks  - Apply yellow socks and bracelet for high fall risk patients  - Consider moving patient to room near nurses station  Outcome: Progressing  Goal: Maintain or return to baseline ADL function  Description: INTERVENTIONS:  -  Assess patient's ability to carry out ADLs; assess patient's baseline for ADL function and identify physical deficits which impact ability to perform ADLs (bathing, care of mouth/teeth, toileting, grooming, dressing, etc.)  - Assess/evaluate cause of self-care deficits   - Assess range of motion  - Assess patient's mobility; develop plan if impaired  - Assess patient's need for assistive devices and provide as appropriate  - Encourage maximum independence but intervene and  supervise when necessary  - Involve family in performance of ADLs  - Assess for home care needs following discharge   - Consider OT consult to assist with ADL evaluation and planning for discharge  - Provide patient education as appropriate  Outcome: Progressing  Goal: Maintains/Returns to pre admission functional level  Description: INTERVENTIONS:  - Perform AM-PAC 6 Click Basic Mobility/ Daily Activity assessment daily.  - Set and communicate daily mobility goal to care team and patient/family/caregiver.   - Collaborate with rehabilitation services on mobility goals if consulted  - Perform Range of Motion 3 times a day.  - Reposition patient every 2 hours.  - Dangle patient 3 times a day  - Stand patient 3 times a day  - Ambulate patient 3 times a day  - Out of bed to chair 3 times a day   - Out of bed for meals 3 times a day  - Out of bed for toileting  - Record patient progress and toleration of activity level   Outcome: Progressing     Problem: DISCHARGE PLANNING  Goal: Discharge to home or other facility with appropriate resources  Description: INTERVENTIONS:  - Identify barriers to discharge w/patient and caregiver  - Arrange for needed discharge resources and transportation as appropriate  - Identify discharge learning needs (meds, wound care, etc.)  - Arrange for interpretive services to assist at discharge as needed  - Refer to Case Management Department for coordinating discharge planning if the patient needs post-hospital services based on physician/advanced practitioner order or complex needs related to functional status, cognitive ability, or social support system  Outcome: Progressing     Problem: Knowledge Deficit  Goal: Patient/family/caregiver demonstrates understanding of disease process, treatment plan, medications, and discharge instructions  Description: Complete learning assessment and assess knowledge base.  Interventions:  - Provide teaching at level of understanding  - Provide teaching via  preferred learning methods  Outcome: Progressing

## 2024-03-07 NOTE — H&P
History and Physical - [facility]  Family [facility]     Patient Information: Gregg Partida 63 y.o. male MRN: [id number]  Unit/Bed#: [address] [place] [address] Encounter: [id number]  Admitting Physician: Brandy Connors DO   PCP: Korin Lo MD  Date of Admission:  03/07/24     Assessment and Plan     * Alcohol abuse with withdrawal delirium (HCC)  Assessment & Plan  Patient presented to the ED last night because he was not feeling well after drinking alcohol patient states that he drank 1 quart of vodka and couple hours.  States that he drank that because he is depressed but he does not know why he is depressed.  Patient is nauseous, has tremors, palpitation, sweating.  Patient is alert and oriented x 4.    etOH: 91    CIWA protocol activated with Ativan  Continue to take thiamine, folate, multivitamin  Fall precautions  Monitor for withdrawal  Tigan every 6 hours for nausea    COPD (chronic obstructive pulmonary disease) (HCC)  Assessment & Plan  Chronic, stable    Patient smokes 1 pack of cigarette daily    Continue Breo Ellipta  Continuous pulse oximetry  Continue albuterol inhaler    Sleep apnea  Assessment & Plan  PT does not have a CPAP at home due to financial difficulties.     CPAP ordered    Paroxysmal atrial fibrillation (HCC)  Assessment & Plan  Chronic, stable    Continue 24-hour Tele monitoring  Continue Eliquis  Continue metoprolol 50 mg twice daily    Stage 3a chronic kidney disease (HCC)  Assessment & Plan  Lab Results   Component Value Date    EGFR 43 03/07/2024    EGFR 84 02/14/2024    EGFR 92 02/13/2024    CREATININE 1.68 (H) 03/07/2024    CREATININE 0.96 02/14/2024    CREATININE 0.88 02/13/2024     Baseline Creatinine 1.0-1.1  Monitor urine output  Avoid nephrotoxic medication  Monitor creatinine    Chronic heart failure with preserved ejection fraction (HCC)  Assessment & Plan  Wt Readings from Last 3 Encounters:   03/07/24 89.8 kg (197 lb 15.6 oz)   02/14/24 89.8 kg (198 lb)    01/27/24 88.5 kg (195 lb 1.7 oz)     Echo 9/23: EF 62%    Continue metoprolol 50 mg every 12  Continue lisinopril 10mg daily  Daily weight   Strict I and O's    Nicotine abuse  Assessment & Plan  Chronic, stable smokes 1 pack a day    Nicotine patch 21 mg / 24-hour    CAD (coronary artery disease)  Assessment & Plan  Hx of CAD s/p CABG    Continue aspirin 81 mg   Eliquis 5 mg twice daily  Continue metoprolol 50 mg every 12 hours    History of prostate cancer  Assessment & Plan  Diagnosed with prostate cancer in 2020, status postradiation and resection in 2021    Continue tamsulosin 0.5 mg daily  Monitor for urinary retention    Dyslipidemia  Assessment & Plan  Chronic, stable    Continue pravastatin 40 mg daily    Hx of CABG  Assessment & Plan  Triple bypass surgery in 2004  Patient denies chest pain  on admission    24-hour continuous telemetry monitoring    Hypomagnesemia  Assessment & Plan  Patient's magnesium on admission was 1.3    Received 2 mg in ED  Ordered another 2 mg Mag    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 disease (HCC)  Assessment & Plan  Lab Results   Component Value Date    HGBA1C 5.4 01/24/2024       Recent Labs     03/07/24  1321   POCGLU 92       Blood Sugar Average: Last 72 hrs:  (P) 92    Held home metformin 500 mg  Starting this patient on a ISS  Hypoglycemia protocol activated  Finger glucose stick ACHS           Fluids: 50ml NS  Electrolyte repletion: Replete as needed.  Nutrition:        Diet Orders   (From admission, onward)                 Start     Ordered    03/07/24 1519  Dietary nutrition supplements  Once        Question Answer Comment   Select Supplement: Ensure Compact-Chocolate    Frequency Dinner        03/07/24 1518    03/07/24 1434  Diet Vlad/CHO Controlled; Consistent Carbohydrate Diet Level 3 (6 carb servings/90 grams CHO/meal); Fluid Restriction 1800 ML  Diet effective now        References:    Adult Nutrition Support Algorithm    RD Therapeutic Diet Order  Protocol   Question Answer Comment   Diet Type Vlad/CHO Controlled    Vlad/CHO Controlled Consistent Carbohydrate Diet Level 3 (6 carb servings/90 grams CHO/meal)    Other Restriction(s): Fluid Restriction 1800 ML    RD to adjust diet per protocol? Yes        03/07/24 1434                   VTE Prophylaxis: VTE Score: 4 Moderate Risk (Score 3-4) - Pharmacological DVT Prophylaxis Ordered: apixaban (Eliquis).  Code Status: Level 1 - Full Code  Anticipated Length of Stay:  Patient will be admitted on an Observation basis with an anticipated length of stay of  < than 2 midnights.     Justification for Hospital Stay:   Total Time for Visit, including Counseling / Coordination of Care: >30 mins. Greater than 50% of this total time spent on direct patient counseling and coordination of care.  Patient Information Sharing: With the consent of Gregg Partida, their loved ones  will be  notified today by inpatient team of the patient’s condition and current plan. All questions answered.     Chief Complaint:     Chief Complaint   Patient presents with    Alcohol Intoxication     Pt reports calling ems due to not feeling well after drinking alcohol. Pt voluntary and calm and cooperative at this time. Admits to drinking a quart of vodka and a couple beers today. Pt requesting OORP.       Subjective      History of Present Illness:     Gregg Partida is a 63 y.o. male w/ PMH of A-fib, dyslipidemia, history of CABG, DM type II, CKD, smoker, CHF with preserved EF who presents to the ED because he was not feeling well.  Patient states that he called EMS because he drank a quart of vodka in couple hours yesterday and started not feeling well.  He also states that he drank couple beers. Patient states that he is nauseous and he started drinking yesterday because he was depressed.Patient cannot tell me why he is depressed or if he wants to cut back on drinking. Patient presented to the ED on 3/6 and requested ORP services.  Patient was  kept overnight in the ED as a hold to be able to discharge at a outpatient rehab facility.  Outpatient rehab facility came to  the patient on 3/7 morning but patient declined.     Review of Systems:  Review of Systems   Constitutional:  Positive for chills. Negative for fever.   Eyes:  Negative for visual disturbance.   Respiratory:  Negative for cough and shortness of breath.    Cardiovascular:  Negative for chest pain, palpitations and leg swelling.   Gastrointestinal:  Negative for abdominal pain and vomiting.   Genitourinary:  Negative for dysuria and hematuria.   Musculoskeletal:  Negative for arthralgias and back pain.   Skin:  Negative for color change and rash.   Neurological:  Positive for light-headedness and headaches. Negative for seizures and syncope.   Psychiatric/Behavioral:  Positive for agitation.    All other systems reviewed and are negative.       Past Medical History:   Diagnosis Date    Acute on chronic kidney failure      Alcohol withdrawal ([facility]) 06/07/2019    Atrial fibrillation ([facility])     Cancer ([facility])     prostate ca,had radiation    Cardiac disease     stents,then triple bypass    COPD (chronic obstructive pulmonary disease) ([facility])     Coronary artery disease     ETOH abuse     Heart failure ([facility])     History of heart surgery     [facility]    Hx of heart artery stent     2014    Hyperlipidemia     Hypertension     Hypovolemic shock ([facility]) 12/22/2019    Lumbar spondylitis ([facility]) 10/13/2022    Nasal bone fracture 10/10/2022    Prostate CA ([facility])     S/P CABG x 3     2004    Sleep apnea      Past Surgical History:   Procedure Laterality Date    CARDIAC CATHETERIZATION      2 stents    CORONARY ARTERY BYPASS GRAFT  2004    OR ARTHRD ANT INTERBODY MIN DSC CRV BELOW C2 N/A 12/16/2020    Procedure: Anterior cervical discectomy with fusion C4-C7; Posterior cervical decompression and fusion C2-T2;  Surgeon: David Rowell MD;  Location: [facility];  Service: Neurosurgery     TONSILLECTOMY       No Known Allergies  Prior to Admission Medications   Prescriptions Last Dose Informant Patient Reported? Taking?   Blood Glucose Monitoring Suppl (ONE TOUCH ULTRA MINI) w/Device KIT  Self Yes No   Sig: Use as directed   Blood Pressure Monitoring (Adult Blood Pressure Cuff Lg) KIT   No No   Sig: Use in the morning   Breo Ellipta 200-25 MCG/ACT inhaler  Self No No   Sig: Inhale 1 puff daily Rinse mouth after use.   ONETOUCH DELICA LANCETS FINE MISC  Self Yes No   Sig: 3 (three) times a day Test   Thiamine HCl (vitamin B-1) 100 MG TABS  Self No No   Sig: TAKE 1 TABLET DAILY   albuterol (Ventolin HFA) 90 mcg/act inhaler  Self No No   Sig: Inhale 2 puffs every 4 (four) hours as needed for wheezing   aluminum-magnesium hydroxide-simethicone (MAALOX) 7304-8921-143 mg/30 mL suspension   No No   Sig: Take 30 mL by mouth every 4 (four) hours as needed for indigestion or heartburn   apixaban (ELIQUIS) 5 mg   No No   Sig: Take 1 tablet (5 mg total) by mouth 2 (two) times a day Do not start before January 31, 2024.   aspirin (ECOTRIN LOW STRENGTH) 81 mg EC tablet  Self Yes No   Sig: Take 81 mg by mouth daily   ferrous sulfate 325 (65 Fe) mg tablet  Self No No   Sig: Take 1 tablet (325 mg total) by mouth daily with breakfast   folic acid (FOLVITE) 1 mg tablet   No No   Sig: TAKE 1 TABLET DAILY   gabapentin (NEURONTIN) 300 mg capsule   No No   Sig: TAKE ONE CAPSULE THREE TIMES DAILY   lisinopril (ZESTRIL) 20 mg tablet  Self No No   Sig: Take 0.5 tablets (10 mg total) by mouth daily   magnesium Oxide (MAG-OX) 400 mg TABS   No No   Sig: Take 1 tablet (400 mg total) by mouth 2 (two) times a day   metFORMIN (GLUCOPHAGE) 500 mg tablet  Self No No   Sig: Take 1 tablet (500 mg total) by mouth daily with breakfast Do not start before September 23, 2023.   metoprolol tartrate (LOPRESSOR) 50 mg tablet   No No   Sig: Take 1 tablet (50 mg total) by mouth every 12 (twelve) hours   naltrexone (REVIA) 50 mg tablet  Self No No    Sig: Take 1 tablet (50 mg total) by mouth daily   nicotine (NICODERM CQ) 21 mg/24 hr TD 24 hr patch  Self No No   Sig: Place 1 patch on the skin over 24 hours daily Do not start before December 4, 2023.   pantoprazole (PROTONIX) 40 mg tablet   No No   Sig: TAKE 1 TABLET TWO TIMES A DAY   pravastatin (PRAVACHOL) 40 mg tablet  Self No No   Sig: TAKE 1 TABLET DAILY WITH DINNER   ranolazine (RANEXA) 500 mg 12 hr tablet  Self No No   Sig: TAKE 1 TABLET EVERY 12 HOURS   senna-docusate sodium (SENOKOT S) 8.6-50 mg per tablet   No No   Sig: Take 1 tablet by mouth daily as needed for constipation   tamsulosin (FLOMAX) 0.4 mg  Self No No   Sig: TAKE 1 CAPSULE DAILY   varenicline (CHANTIX) 1 mg tablet   No No   Sig: TAKE 1 TABLET 2 TIMES A DAY      Facility-Administered Medications: None     Social History     Socioeconomic History    Marital status: Single     Spouse name: Not on file    Number of children: Not on file    Years of education: Not on file    Highest education level: Not on file   Occupational History    Occupation: contractor, 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)   Tobacco Use    Smoking status: Every Day     Current packs/day: 1.50     Average packs/day: 1.5 packs/day for 40.0 years (60.0 ttl pk-yrs)     Types: Cigarettes    Smokeless tobacco: Never   Vaping Use    Vaping status: Never Used   Substance and Sexual Activity    Alcohol use: Yes     Alcohol/week: 4.0 standard drinks of alcohol     Types: 4 Standard drinks or equivalent per week     Comment: quart of vodka daily    Drug use: No    Sexual activity: Not Currently   Other Topics Concern    Not on file   Social History Narrative    Not on file     Social Determinants of Health     Financial Resource Strain: Low Risk  (11/2/2023)    Overall Financial Resource Strain (CARDIA)     Difficulty of Paying Living Expenses: Not very hard   Food Insecurity: No Food Insecurity (1/23/2024)    Hunger Vital Sign     Worried About Running Out of Food in  "the Last Year: Never true     Ran Out of Food in the Last Year: Never true   Transportation Needs: No Transportation Needs (1/23/2024)    PRAPARE - Transportation     Lack of Transportation (Medical): No     Lack of Transportation (Non-Medical): No   Physical Activity: Not on file   Stress: No Stress Concern Present (11/2/2023)    [employer] [place] [employer] - Occupational Stress Questionnaire     Feeling of Stress : Not at all   Social Connections: Unknown (4/15/2021)    Social Connection and Isolation Panel [NHANES]     Frequency of Communication with Friends and Family: More than three times a week     Frequency of Social Gatherings with Friends and Family: Not on file     Attends Anabaptist Services: Not on file     Active Member of Clubs or Organizations: Not on file     Attends Club or Organization Meetings: Not on file     Marital Status: Not on file   Intimate Partner Violence: Not on file   Housing Stability: Low Risk  (1/23/2024)    Housing Stability Vital Sign     Unable to Pay for Housing in the Last Year: No     Number of Places Lived in the Last Year: 1     Unstable Housing in the Last Year: No     Family History   Problem Relation Age of Onset    Diabetes Mother     Uterine cancer Mother     COPD Father     Hypertension Father         Patient Pre-hospital Living Situation: Home  Patient Pre-hospital Level of Mobility: Mobile  Patient Pre-hospital Diet Restrictions: Diabetic diet, 1800 ml fluid restriction          Objective     Physical Exam:   Vitals:   Blood Pressure: 142/85 (03/07/24 1500)  Pulse: 94 (03/07/24 1500)  Temperature: 98.8 °F (37.1 °C) (03/07/24 1321)  Temp Source: Oral (03/07/24 1321)  Respirations: 18 (03/07/24 1321)  Height: 6' 2\" (188 cm) (03/07/24 1322)  Weight - Scale: 89.8 kg (197 lb 15.6 oz) (03/07/24 1322)  SpO2: 93 % (03/07/24 1422)     Physical Exam  Constitutional:       Appearance: Normal appearance.   HENT:      Nose: Nose normal.      Mouth/Throat:      Mouth: " "Mucous membranes are moist.   Cardiovascular:      Rate and Rhythm: Normal rate and regular rhythm.      Pulses: Normal pulses.      Heart sounds: Normal heart sounds.   Pulmonary:      Effort: Pulmonary effort is normal.      Breath sounds: Normal breath sounds.   Abdominal:      General: Abdomen is flat.      Palpations: Abdomen is soft.   Musculoskeletal:         General: Normal range of motion.   Skin:     General: Skin is warm and dry.   Neurological:      General: No focal deficit present.      Mental Status: He is alert and oriented to person, place, and time. Mental status is at baseline.   Psychiatric:         Mood and Affect: Mood normal.         Behavior: Behavior normal.         Thought Content: Thought content normal.         Judgment: Judgment normal.             Lab Results: I have personally reviewed pertinent reports.    Results from last 7 days   Lab Units 03/07/24  0933   WBC Thousand/uL 4.58   HEMOGLOBIN g/dL 13.1   HEMATOCRIT % 37.8   PLATELETS Thousands/uL 97*   LYMPHO PCT % 23   MONO PCT % 10   EOS PCT % 5   BANDS PCT % 3     Results from last 7 days   Lab Units 03/07/24  0933   POTASSIUM mmol/L 4.2   CHLORIDE mmol/L 98   CO2 mmol/L 26   BUN mg/dL 24   CREATININE mg/dL 1.68*   CALCIUM mg/dL 8.1*   ALK PHOS U/L 74   ALT U/L 41   AST U/L 62*   EGFR ml/min/1.73sq m 43   MAGNESIUM mg/dL 1.3*                            Invalid input(s): \"URIBILINOGEN\"                    Imaging: I have personally reviewed pertinent reports.    No results found.                    ** Please Note: This note has been constructed using a voice recognition system **     Beverly Freitas MD  03/07/24  3:20 PM    "

## 2024-03-08 LAB
ALBUMIN SERPL BCP-MCNC: 3.5 G/DL (ref 3.5–5)
ALP SERPL-CCNC: 66 U/L (ref 34–104)
ALT SERPL W P-5'-P-CCNC: 29 U/L (ref 7–52)
ANION GAP SERPL CALCULATED.3IONS-SCNC: 8 MMOL/L
AST SERPL W P-5'-P-CCNC: 39 U/L (ref 13–39)
BASOPHILS # BLD AUTO: 0.04 THOUSANDS/ÂΜL (ref 0–0.1)
BASOPHILS NFR BLD AUTO: 1 % (ref 0–1)
BILIRUB SERPL-MCNC: 0.81 MG/DL (ref 0.2–1)
BUN SERPL-MCNC: 32 MG/DL (ref 5–25)
CALCIUM SERPL-MCNC: 7.9 MG/DL (ref 8.4–10.2)
CHLORIDE SERPL-SCNC: 101 MMOL/L (ref 96–108)
CO2 SERPL-SCNC: 27 MMOL/L (ref 21–32)
CREAT SERPL-MCNC: 1.38 MG/DL (ref 0.6–1.3)
EOSINOPHIL # BLD AUTO: 0.15 THOUSAND/ÂΜL (ref 0–0.61)
EOSINOPHIL NFR BLD AUTO: 4 % (ref 0–6)
ERYTHROCYTE [DISTWIDTH] IN BLOOD BY AUTOMATED COUNT: 14.8 % (ref 11.6–15.1)
GFR SERPL CREATININE-BSD FRML MDRD: 54 ML/MIN/1.73SQ M
GLUCOSE SERPL-MCNC: 114 MG/DL (ref 65–140)
GLUCOSE SERPL-MCNC: 120 MG/DL (ref 65–140)
GLUCOSE SERPL-MCNC: 134 MG/DL (ref 65–140)
GLUCOSE SERPL-MCNC: 99 MG/DL (ref 65–140)
HCT VFR BLD AUTO: 35.1 % (ref 36.5–49.3)
HGB BLD-MCNC: 11.6 G/DL (ref 12–17)
IMM GRANULOCYTES # BLD AUTO: 0.02 THOUSAND/UL (ref 0–0.2)
IMM GRANULOCYTES NFR BLD AUTO: 1 % (ref 0–2)
LYMPHOCYTES # BLD AUTO: 0.84 THOUSANDS/ÂΜL (ref 0.6–4.47)
LYMPHOCYTES NFR BLD AUTO: 20 % (ref 14–44)
MAGNESIUM SERPL-MCNC: 1.8 MG/DL (ref 1.9–2.7)
MCH RBC QN AUTO: 32.7 PG (ref 26.8–34.3)
MCHC RBC AUTO-ENTMCNC: 33 G/DL (ref 31.4–37.4)
MCV RBC AUTO: 99 FL (ref 82–98)
MONOCYTES # BLD AUTO: 0.61 THOUSAND/ÂΜL (ref 0.17–1.22)
MONOCYTES NFR BLD AUTO: 14 % (ref 4–12)
NEUTROPHILS # BLD AUTO: 2.59 THOUSANDS/ÂΜL (ref 1.85–7.62)
NEUTS SEG NFR BLD AUTO: 60 % (ref 43–75)
NRBC BLD AUTO-RTO: 0 /100 WBCS
PHOSPHATE SERPL-MCNC: 3 MG/DL (ref 2.3–4.1)
PLATELET # BLD AUTO: 69 THOUSANDS/UL (ref 149–390)
PMV BLD AUTO: 10.5 FL (ref 8.9–12.7)
POTASSIUM SERPL-SCNC: 4 MMOL/L (ref 3.5–5.3)
PROT SERPL-MCNC: 6.3 G/DL (ref 6.4–8.4)
RBC # BLD AUTO: 3.55 MILLION/UL (ref 3.88–5.62)
SODIUM SERPL-SCNC: 136 MMOL/L (ref 135–147)
WBC # BLD AUTO: 4.25 THOUSAND/UL (ref 4.31–10.16)

## 2024-03-08 PROCEDURE — 80053 COMPREHEN METABOLIC PANEL: CPT

## 2024-03-08 PROCEDURE — 99232 SBSQ HOSP IP/OBS MODERATE 35: CPT | Performed by: FAMILY MEDICINE

## 2024-03-08 PROCEDURE — HZ2ZZZZ DETOXIFICATION SERVICES FOR SUBSTANCE ABUSE TREATMENT: ICD-10-PCS | Performed by: FAMILY MEDICINE

## 2024-03-08 PROCEDURE — 94660 CPAP INITIATION&MGMT: CPT

## 2024-03-08 PROCEDURE — 83735 ASSAY OF MAGNESIUM: CPT

## 2024-03-08 PROCEDURE — 84100 ASSAY OF PHOSPHORUS: CPT

## 2024-03-08 PROCEDURE — 85025 COMPLETE CBC W/AUTO DIFF WBC: CPT

## 2024-03-08 PROCEDURE — 82948 REAGENT STRIP/BLOOD GLUCOSE: CPT

## 2024-03-08 RX ORDER — LORAZEPAM 1 MG/1
2 TABLET ORAL ONCE
Status: COMPLETED | OUTPATIENT
Start: 2024-03-08 | End: 2024-03-08

## 2024-03-08 RX ORDER — MAGNESIUM SULFATE HEPTAHYDRATE 40 MG/ML
2 INJECTION, SOLUTION INTRAVENOUS ONCE
Status: COMPLETED | OUTPATIENT
Start: 2024-03-08 | End: 2024-03-08

## 2024-03-08 RX ADMIN — MAGNESIUM SULFATE HEPTAHYDRATE 2 G: 40 INJECTION, SOLUTION INTRAVENOUS at 09:15

## 2024-03-08 RX ADMIN — APIXABAN 5 MG: 5 TABLET, FILM COATED ORAL at 08:08

## 2024-03-08 RX ADMIN — FOLIC ACID 1000 MCG: 1 TABLET ORAL at 08:08

## 2024-03-08 RX ADMIN — APIXABAN 5 MG: 5 TABLET, FILM COATED ORAL at 17:25

## 2024-03-08 RX ADMIN — Medication 1 TABLET: at 08:08

## 2024-03-08 RX ADMIN — METOPROLOL TARTRATE 50 MG: 50 TABLET, FILM COATED ORAL at 08:08

## 2024-03-08 RX ADMIN — VARENICLINE 1 MG: 0.5 TABLET, FILM COATED ORAL at 17:25

## 2024-03-08 RX ADMIN — Medication 400 MG: at 17:25

## 2024-03-08 RX ADMIN — LORAZEPAM 2 MG: 1 TABLET ORAL at 21:06

## 2024-03-08 RX ADMIN — LORAZEPAM 2 MG: 1 TABLET ORAL at 09:15

## 2024-03-08 RX ADMIN — GABAPENTIN 300 MG: 300 CAPSULE ORAL at 08:08

## 2024-03-08 RX ADMIN — VARENICLINE 1 MG: 0.5 TABLET, FILM COATED ORAL at 08:10

## 2024-03-08 RX ADMIN — RANOLAZINE 500 MG: 500 TABLET, FILM COATED, EXTENDED RELEASE ORAL at 13:46

## 2024-03-08 RX ADMIN — PANTOPRAZOLE SODIUM 40 MG: 40 TABLET, DELAYED RELEASE ORAL at 15:54

## 2024-03-08 RX ADMIN — FLUTICASONE FUROATE AND VILANTEROL TRIFENATATE 1 PUFF: 200; 25 POWDER RESPIRATORY (INHALATION) at 08:08

## 2024-03-08 RX ADMIN — SODIUM CHLORIDE 50 ML/HR: 0.9 INJECTION, SOLUTION INTRAVENOUS at 21:07

## 2024-03-08 RX ADMIN — GABAPENTIN 300 MG: 300 CAPSULE ORAL at 20:52

## 2024-03-08 RX ADMIN — NICOTINE 1 PATCH: 21 PATCH, EXTENDED RELEASE TRANSDERMAL at 08:09

## 2024-03-08 RX ADMIN — METOPROLOL TARTRATE 50 MG: 50 TABLET, FILM COATED ORAL at 20:52

## 2024-03-08 RX ADMIN — Medication 400 MG: at 08:08

## 2024-03-08 RX ADMIN — PANTOPRAZOLE SODIUM 40 MG: 40 TABLET, DELAYED RELEASE ORAL at 06:11

## 2024-03-08 RX ADMIN — RANOLAZINE 500 MG: 500 TABLET, FILM COATED, EXTENDED RELEASE ORAL at 00:27

## 2024-03-08 RX ADMIN — METOPROLOL TARTRATE 50 MG: 50 TABLET, FILM COATED ORAL at 00:27

## 2024-03-08 RX ADMIN — THIAMINE HCL TAB 100 MG 100 MG: 100 TAB at 08:08

## 2024-03-08 RX ADMIN — PRAVASTATIN SODIUM 40 MG: 40 TABLET ORAL at 15:54

## 2024-03-08 RX ADMIN — GABAPENTIN 300 MG: 300 CAPSULE ORAL at 15:54

## 2024-03-08 RX ADMIN — FERROUS SULFATE TAB 325 MG (65 MG ELEMENTAL FE) 325 MG: 325 (65 FE) TAB at 08:08

## 2024-03-08 RX ADMIN — TAMSULOSIN HYDROCHLORIDE 0.4 MG: 0.4 CAPSULE ORAL at 08:08

## 2024-03-08 RX ADMIN — ASPIRIN 81 MG 81 MG: 81 TABLET ORAL at 08:08

## 2024-03-08 NOTE — PROGRESS NOTES
Daily Progress Note - [facility]  Family Medicine Residency  Gregg Pratida 63 y.o. male MRN: [id number]  Unit/Bed#: [address] [place] [address] Encounter: [id number]  Admitting Physician: Brandy Connors DO   PCP: Korin Lo MD  Date of Admission:  3/6/2024 10:06 PM    Assessment and Plan    * Alcohol abuse with withdrawal delirium (HCC)  Assessment & Plan  Patient presented to the ED last night because he was not feeling well after drinking alcohol patient states that he drank 1 quart of vodka and couple hours.  States that he drank that because he is depressed but he does not know why he is depressed.  Patient is nauseous, has tremors, palpitation, sweating.  Patient is alert and oriented x 4.  etOH: 91    3/8/24 : Patient still has some shakiness and tremors but denies any nausea, palpitations or sweating.  Patient has received a total of 7 mg of Ativan since admission.     Plan  CIWA protocol activated with Ativan  Continue to take thiamine, folate, multivitamin  Fall precautions  Monitor for withdrawal  Tigan every 6 hours for nausea    Acute kidney injury superimposed on CKD   Assessment & Plan  Lab Results   Component Value Date    EGFR 54 03/08/2024    EGFR 43 03/07/2024    EGFR 84 02/14/2024    CREATININE 1.38 (H) 03/08/2024    CREATININE 1.68 (H) 03/07/2024    CREATININE 0.96 02/14/2024   .  Cr on admission 1.68. Baseline creatinine around 1 to 1.3. Likely prerenal secondary to dehydration and alcohol abuse. Patient has had previous admissions with [facility] secondary to alcohol abuse.   03/08 : Bladder scan negative    Plan  Avoid nephrotoxic medications as able/ dose medications per GFR  I/Os  Daily weights  Monitor renal function    Hyponatremia  Assessment & Plan  Chronic hyponatremia in setting of alcohol abuse, poor oral intake, and possible SIADH   Sodium on admission 135  Patient is on salt tablets 3 g twice daily at home but states that he gets nauseous whenever he takes salt tablets  03/08 :  Na 136    Plan  1800 cc fluid restriction and for him to drink Ensure 1 can twice a day to replace the salt tabs as per nephrology  Trend daily  Continue fluid restriction    COPD (chronic obstructive pulmonary disease) ([facility])  Assessment & Plan  Chronic, stable    Patient smokes 1 pack of cigarette daily    Continue Breo Ellipta  Continuous pulse oximetry  Continue albuterol inhaler    Hypomagnesemia  Assessment & Plan  Chronic. Low Mg along with thrombocytopenia might possibly be due to continued alcohol use. Patient's magnesium on admission was 1.3  Received 2 mg in ED  Ordered another 2 mg Mag    Plan  Trend daily  Replete as needed   Continue with home magnesium oxide 400 mg twice daily    Stage 3a chronic kidney disease (HCC)  Assessment & Plan  Lab Results   Component Value Date    EGFR 43 03/07/2024    EGFR 84 02/14/2024    EGFR 92 02/13/2024    CREATININE 1.68 (H) 03/07/2024    CREATININE 0.96 02/14/2024    CREATININE 0.88 02/13/2024     Baseline Creatinine 1.0-1.1  Monitor urine output  Avoid nephrotoxic medication  Monitor creatinine    Chronic heart failure with preserved ejection fraction (HCC)  Assessment & Plan  Wt Readings from Last 3 Encounters:   03/07/24 89.8 kg (197 lb 15.6 oz)   02/14/24 89.8 kg (198 lb)   01/27/24 88.5 kg (195 lb 1.7 oz)     Echo 9/23: EF 62%  Patient is euvolemic on examination, does not appear in acute exacerbation.    Continue metoprolol 50 mg every 12  Continue lisinopril 10mg daily  Daily weight   Strict I and O's    CAD (coronary artery disease)  Assessment & Plan  Hx of CAD s/p CABG  Home meds: Aspirin 81 mg, Eliquis 5 mg BID (was not taking), Pravastatin 40 mg, Ranolazine 500mg     Plan  Continue with aspirin, metoprolol, statin, ranolazine  Eliquis 5 mg twice daily  Continue metoprolol 50 mg every 12 hours    Paroxysmal atrial fibrillation (HCC)  Assessment & Plan  Chronic, stable    Plan  Continue 24-hour Tele monitoring  Continue Eliquis  Continue metoprolol 50 mg  twice daily    Sleep apnea  Assessment & Plan  PT does not have a CPAP at home due to financial difficulties.     CPAP ordered    Nicotine abuse  Assessment & Plan  Chronic, stable smokes 1 pack a day    Nicotine patch 21 mg / 24-hour    History of prostate cancer  Assessment & Plan  Diagnosed with prostate cancer in 2020, status postradiation and resection in 2021    Continue tamsulosin 0.5 mg daily  Monitor for urinary retention    Dyslipidemia  Assessment & Plan  Chronic, stable    Continue pravastatin 40 mg daily    Hx of CABG  Assessment & Plan  Triple bypass surgery in 2004  Patient denies chest pain  on admission    24-hour continuous telemetry monitoring    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 disease (HCC)  Assessment & Plan  Lab Results   Component Value Date    HGBA1C 5.4 01/24/2024       Recent Labs     03/07/24  1321   POCGLU 92       Blood Sugar Average: Last 72 hrs:  (P) 92    Chronic, controlled.  Patient is on metformin 500 mg daily    Plan  Hold home metformin  Patient started on insulin sliding scale and Accu-Cheks ACHS  Carbohydrate controlled diet  Hypoglycemia protocol  Monitor blood sugars          VTE Pharmacologic Prophylaxis: VTE Score: 4 Moderate Risk (Score 3-4) - Pharmacological DVT Prophylaxis Contraindicated. Sequential Compression Devices Ordered.    Patient Centered Rounds: I have performed bedside rounds with nursing staff today.    Discussions with Specialists or Other Care Team Provider: No    Education and Discussions with Family / Patient: Yes  Patient Information Sharing:  Gregg Martínezjann was notified today by inpatient team of the patient’s condition and current plan.  All questions answered.     Time Spent for Care: 30 minutes.  More than 50% of total time spent on counseling and coordination of care as described above.    Current Length of Stay: 0 day(s)    Current Patient Status: Inpatient   Certification Statement: The patient will continue to require  additional inpatient hospital stay due to management of alcohol intoxication.    Discharge Plan: Discharge tomorrow    Code Status: Level 1 - Full Code    Subjective:   Patient was seen and examined at bedside.  He continues to have some shakiness and tremors but denies any nausea, vomiting, abdominal pain, hallucinations, sweating.  Patient had a bowel movement yesterday.  He is eating well with improvement in his appetite.  He states that he has not urinated much since last night.  He denies any other complaints today    Objective     Objective:   Vitals:   Temp (24hrs), Av.2 °F (36.8 °C), Min:97.6 °F (36.4 °C), Max:98.8 °F (37.1 °C)    Temp:  [97.6 °F (36.4 °C)-98.8 °F (37.1 °C)] 98.3 °F (36.8 °C)  HR:  [72-87] 72  Resp:  [16-18] 16  BP: (126-167)/(77-92) 143/77  SpO2:  [91 %-97 %] 97 %  Body mass index is 25.16 kg/m².     Input and Output Summary (last 24 hours):       Intake/Output Summary (Last 24 hours) at 3/8/2024 1506  Last data filed at 3/8/2024 0915  Gross per 24 hour   Intake --   Output 100 ml   Net -100 ml       Physical Exam:   Physical Exam  Constitutional:       Appearance: Normal appearance.   HENT:      Nose: Nose normal.      Mouth/Throat:      Mouth: Mucous membranes are moist.   Cardiovascular:      Rate and Rhythm: Normal rate and regular rhythm.      Pulses: Normal pulses.      Heart sounds: Normal heart sounds.   Pulmonary:      Effort: Pulmonary effort is normal.      Breath sounds: Normal breath sounds.   Abdominal:      General: Abdomen is flat.      Palpations: Abdomen is soft.   Musculoskeletal:         General: Normal range of motion.   Skin:     General: Skin is warm and dry.      Findings: Bruising present.   Neurological:      General: No focal deficit present.      Mental Status: He is alert and oriented to person, place, and time. Mental status is at baseline.      Motor: Tremor present.   Psychiatric:         Mood and Affect: Mood normal.         Behavior: Behavior normal.          Thought Content: Thought content normal.         Judgment: Judgment normal.         Additional Data:     Labs:  Results from last 7 days   Lab Units 03/08/24  0610   WBC Thousand/uL 4.25*   HEMOGLOBIN g/dL 11.6*   HEMATOCRIT % 35.1*   PLATELETS Thousands/uL 69*   NEUTROS PCT % 60   LYMPHS PCT % 20   MONOS PCT % 14*   EOS PCT % 4     Results from last 7 days   Lab Units 03/08/24  0610   POTASSIUM mmol/L 4.0   CHLORIDE mmol/L 101   CO2 mmol/L 27   BUN mg/dL 32*   CREATININE mg/dL 1.38*   CALCIUM mg/dL 7.9*   ALK PHOS U/L 66   ALT U/L 29   AST U/L 39         Results from last 7 days   Lab Units 03/08/24  1122 03/07/24  2057 03/07/24  1633 03/07/24  1321   POC GLUCOSE mg/dl 120 108 123 92           * I Have Reviewed All Lab Data Listed Above.  * Additional Pertinent Lab Tests Reviewed: All Labs For Current Hospital Admission Reviewed      Last 24 Hours Medication List:   Current Facility-Administered Medications   Medication Dose Route Frequency Provider Last Rate    acetaminophen  650 mg Oral Q6H PRN Mary Ann Stockton, DO      albuterol  2 puff Inhalation Q4H PRN Beverly Freitas MD      apixaban  5 mg Oral BID Beverly Freitas MD      aspirin  81 mg Oral Daily Beverly Freitas MD      ferrous sulfate  325 mg Oral Daily With Breakfast Beverly Freitas MD      fluticasone-vilanterol  1 puff Inhalation Daily Beverly Freitas MD      folic acid  1,000 mcg Oral Daily Beverly Freitas MD      gabapentin  300 mg Oral TID Beverly Freitas MD      insulin lispro  1-5 Units Subcutaneous TID AC Beverly Freitas MD      magnesium Oxide  400 mg Oral BID Brandy Revankar, DO      metoprolol tartrate  50 mg Oral Q12H SEDA Beverly Freitas MD      multivitamin-minerals  1 tablet Oral Daily Beverly Freitas MD      nicotine  1 patch Transdermal Daily Beverly Freitas MD      pantoprazole  40 mg Oral BID AC Beverly Freitas MD      pravastatin  40 mg Oral Daily With Dinner Beverly Freitas MD      ranolazine  500 mg Oral Q12H Beverly Freitas MD      senna-docusate sodium  1 tablet Oral  Daily PRN Beverly Freitas MD      sodium chloride  50 mL/hr Intravenous Continuous Beverly Freitas MD 50 mL/hr (03/07/24 1346)    tamsulosin  0.4 mg Oral Daily Beverly Freitas MD      vitamin B-1  100 mg Oral Daily Beverly Freitas MD      trimethobenzamide  200 mg Intramuscular Q6H PRN Beverly Freitas MD      varenicline  1 mg Oral BID Beverly Freitas MD               ** Please Note: Dictation voice to text software may have been used in the creation of this document. **    Bipin Freed MD  03/08/24  3:06 PM

## 2024-03-08 NOTE — UTILIZATION REVIEW
Initial Clinical Review    Observation 3/7/24 @ 11.8 converted to inpatient admission 3/8/24 @ 0910 for continued care & tx for alcohol abuse with withdrawal delirium    Admission: Date/Time/Statement:   Admission Orders (From admission, onward)       Ordered        03/08/24 0910  Inpatient Admission  Once            03/07/24 1108  Place in Observation  Once                          Orders Placed This Encounter   Procedures    Inpatient Admission     Standing Status:   Standing     Number of Occurrences:   1     Order Specific Question:   Level of Care     Answer:   Med Surg [16]     Order Specific Question:   Estimated length of stay     Answer:   More than 2 Midnights     Order Specific Question:   Certification     Answer:   I certify that inpatient services are medically necessary for this patient for a duration of greater than two midnights. See H&P and MD Progress Notes for additional information about the patient's course of treatment.     ED Arrival Information       Expected   -    Arrival   3/6/2024 22:04    Acuity   Urgent              Means of arrival   Ambulance    Escorted by   [facility]ist    Admission type   Emergency              Arrival complaint   etoh             Chief Complaint   Patient presents with    Alcohol Intoxication     Pt reports calling ems due to not feeling well after drinking alcohol. Pt voluntary and calm and cooperative at this time. Admits to drinking a quart of vodka and a couple beers today. Pt requesting OORP.       Initial Presentation: to ER 3/6/24  63 yom to ER from home via EMS for alcohol intoxication. Admits to drinking qt vodka & couple of beers. Presents feeling nauseous, looking for ORP services. Hx A-fib, dyslipidemia, history of CABG, DM type II, CKD (baseline creat 1.0-1.2 ), smoker, CHF with preserved EF, daily drinker. Patient was kept overnight in the ED as a hold to be able to discharge at a outpatient rehab facility.  Outpatient rehab  facility came to  the patient on 3/7 morning but patient declined. Admission labs: PLT 97, Cr 1.68, Ca 8.1, Mg 1.3, AST 62, ETOH 91.  Placed in observation status 3/7/24 for alcohol abuse with withdrawal delirium.    Observation to IP admission 3/8/24  3/8/24 Day 3: Has surpassed a 2nd midnight with active treatments and services.  Dx: alcohol abuse with withdrawal delirium. CIWA score this morning up to 13 for N&V, tremors, sweats, anxiety & headache, requiring Ativan. Continue thiamine, folic acid, multivitamin. Encourage complete alcohol cessation. Patient states he will go to rehab facility as soon as possible after discharge. IVF maintained for [facility], Cr improving to 1.38. IV Mg repletion given.      ED Triage Vitals   Temperature Pulse Respirations Blood Pressure SpO2   03/06/24 2219 03/06/24 2219 03/06/24 2219 03/06/24 2219 03/06/24 2219   (!) 97 °F (36.1 °C) 75 18 110/60 98 %      Temp Source Heart Rate Source Patient Position - Orthostatic VS BP Location FiO2 (%)   03/06/24 2219 03/06/24 2219 03/07/24 1306 03/07/24 1306 --   Tympanic Monitor Lying Right arm       Pain Score       03/07/24 1310       No Pain          Wt Readings from Last 1 Encounters:   03/08/24 88.9 kg (196 lb)     Additional Vital Signs:   Date/Time Temp Pulse Resp BP MAP (mmHg) SpO2 Calculated FIO2 (%) - Nasal Cannula Nasal Cannula O2 Flow Rate (L/min) O2 Device Patient Position - Orthostatic VS   03/08/24 02:54:50 97.6 °F (36.4 °C) 74 16 126/79 95 94 % -- -- -- --   03/07/24 23:39:55 98.2 °F (36.8 °C) 77 18 167/82 110 94 % -- -- -- --   03/07/24 22:54:45 97.8 °F (36.6 °C) 73 18 138/79 99 95 % -- -- -- --   03/07/24 2205 -- -- -- -- -- 91 % -- -- -- --   03/07/24 20:39:07 -- 84 -- 156/89 111 93 % -- -- -- --   03/07/24 19:52:50 98.5 °F (36.9 °C) 80 18 147/83 104 92 % -- -- -- Lying   03/07/24 18:42:09 98.8 °F (37.1 °C) 78 18 142/83 103 94 % -- -- None (Room air) Lying   03/07/24 1500 98.7 °F (37.1 °C) 94 18 142/85 -- 93 % 28 2 L/min  Nasal cannula Lying   03/07/24 1422 -- -- -- -- -- 93 % 28 2 L/min Nasal cannula --   03/07/24 1400 98.8 °F (37.1 °C) 135 Abnormal  18 127/68 -- 85 % Abnormal  -- -- None (Room air) Lying   03/07/24 1321 98.8 °F (37.1 °C) 105 18 166/97 -- -- -- -- -- --   03/07/24 1310 -- -- -- -- -- -- -- -- None (Room air) --   03/07/24 13:07:55 98.8 °F (37.1 °C) 105 18 166/97 120 94 % -- -- None (Room air) Lying   03/07/24 13:06:59 98.8 °F (37.1 °C) 107 Abnormal  18 174/96 Abnormal  122 97 % -- -- None (Room air) Lying   03/07/24 1039 -- -- -- 142/72 -- -- -- -- -- --   03/07/24 0933 -- 110 Abnormal  18 169/91 -- 94 % -- -- None (Room air) --   03/07/24 0635 -- 94 18 125/88 -- 94 % -- -- -- --   03/06/24 2219 97 °F (36.1 °C) Abnormal  75 18 110/60 -- 98 % -- -- -- --     Pertinent Labs/Diagnostic Test Results:     Results from last 7 days   Lab Units 03/08/24  0610 03/07/24  0933   WBC Thousand/uL 4.25* 4.58   HEMOGLOBIN g/dL 11.6* 13.1   HEMATOCRIT % 35.1* 37.8   PLATELETS Thousands/uL 69* 97*   NEUTROS ABS Thousands/µL 2.59  --    BANDS PCT %  --  3     Results from last 7 days   Lab Units 03/08/24  0610 03/07/24  0933   SODIUM mmol/L 136 136   POTASSIUM mmol/L 4.0 4.2   CHLORIDE mmol/L 101 98   CO2 mmol/L 27 26   ANION GAP mmol/L 8 12   BUN mg/dL 32* 24   CREATININE mg/dL 1.38* 1.68*   EGFR ml/min/1.73sq m 54 43   CALCIUM mg/dL 7.9* 8.1*   MAGNESIUM mg/dL 1.8* 1.3*   PHOSPHORUS mg/dL 3.0  --      Results from last 7 days   Lab Units 03/08/24  0610 03/07/24  0933   AST U/L 39 62*   ALT U/L 29 41   ALK PHOS U/L 66 74   TOTAL PROTEIN g/dL 6.3* 7.2   ALBUMIN g/dL 3.5 4.0   TOTAL BILIRUBIN mg/dL 0.81 0.85     Results from last 7 days   Lab Units 03/07/24 2057 03/07/24  1633 03/07/24  1321   POC GLUCOSE mg/dl 108 123 92     Results from last 7 days   Lab Units 03/08/24  0610 03/07/24  0933   GLUCOSE RANDOM mg/dL 134 102     BETA-HYDROXYBUTYRATE   Date Value Ref Range Status   01/22/2024 3.2 (H) <0.6 mmol/L Final      Results from  last 7 days   Lab Units 03/07/24  0933   ETHANOL LVL mg/dL 91*     ED Treatment:   Medication Administration from 03/06/2024 2204 to 03/07/2024 1247         Date/Time Order Dose Route Action     03/06/2024 2223 EST ondansetron (ZOFRAN-ODT) dispersible tablet 4 mg 4 mg Oral Given     03/07/2024 0936 EST LORazepam (ATIVAN) injection 1 mg 1 mg Intravenous Given     03/07/2024 0939 EST sodium chloride 0.9 % bolus 1,000 mL 1,000 mL Intravenous New Bag     03/07/2024 1109 EST magnesium sulfate 2 g/50 mL IVPB (premix) 2 g 2 g Intravenous New Bag     03/07/2024 1107 EST diazepam (VALIUM) injection 10 mg 10 mg Intravenous Given          Past Medical History:   Diagnosis Date    Acute on chronic kidney failure      Alcohol withdrawal (HCC) 06/07/2019    Atrial fibrillation (HCC)     Cancer (HCC)     prostate ca,had radiation    Cardiac disease     stents,then triple bypass    COPD (chronic obstructive pulmonary disease) ([facility])     Coronary artery disease     ETOH abuse     Heart failure ([facility])     History of heart surgery     [facility]    Hx of heart artery stent     2014    Hyperlipidemia     Hypertension     Hypovolemic shock ([facility]) 12/22/2019    Lumbar spondylitis ([facility]) 10/13/2022    Nasal bone fracture 10/10/2022    Prostate CA ([facility])     S/P CABG x 3     2004    Sleep apnea      Present on Admission:   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 disease ([facility])   Stage 3a chronic kidney disease ([facility])   Sleep apnea   Paroxysmal atrial fibrillation (HCC)   Nicotine abuse   Hypomagnesemia   Dyslipidemia   COPD (chronic obstructive pulmonary disease) (HCC)   Chronic heart failure with preserved ejection fraction (HCC)   Alcohol abuse with withdrawal delirium (HCC)   CAD (coronary artery disease)      Admitting Diagnosis: Alcohol intoxication (HCC) [F10.929]  Alcoholic intoxication without complication (HCC) [F10.920]  Age/Sex: 63 y.o. male  Admission Orders:  CIWA protocol  Scd/foot  pumps  Accuchecks  Cont pulse ox  Telemetry  1800cc Fluid restriction    Scheduled Medications:  Medications 02/28 02/29 03/01 03/02 03/03 03/04 03/05 03/06 03/07 03/08   apixaban (ELIQUIS) tablet 5 mg  Dose: 5 mg  Freq: 2 times daily Route: PO  Start: 03/07/24 1300   Admin Instructions:      Order specific questions:               6454     1724      0900     1800        aspirin chewable tablet 81 mg  Dose: 81 mg  Freq: Daily Route: PO  Start: 03/07/24 1300            1347      0900        diazepam (VALIUM) injection 10 mg  Dose: 10 mg  Freq: Once Route: IV  Start: 03/07/24 1100 End: 03/07/24 1107   Admin Instructions:               1107         ferrous sulfate tablet 325 mg  Dose: 325 mg  Freq: Daily with breakfast Route: PO  Start: 03/08/24 0730   Admin Instructions:                0730        fluticasone-vilanterol 200-25 mcg/actuation 1 puff  Dose: 1 puff  Freq: Daily Route: IN  Start: 03/07/24 1300   Admin Instructions:               1348      0900        folic acid (FOLVITE) tablet 1,000 mcg  Dose: 1,000 mcg  Freq: Daily Route: PO  Start: 03/07/24 1300            1346      0900        gabapentin (NEURONTIN) capsule 300 mg  Dose: 300 mg  Freq: 3 times daily Route: PO  Start: 03/07/24 1600   Admin Instructions:               1512     2036      0900     1600     2100         insulin lispro (HumALOG/ADMELOG) 100 units/mL subcutaneous injection 1-5 Units  Dose: 1-5 Units  Freq: 3 times daily before meals Route: SC  Start: 03/07/24 1300   Admin Instructions:               (8641) (5877)      0738     1130     1600        lisinopril (ZESTRIL) tablet 10 mg  Dose: 10 mg  Freq: Daily Route: PO  Start: 03/07/24 1300 End: 03/07/24 1649   Admin Instructions:      Order specific questions:               6541 7550-D/C'd       LORazepam (ATIVAN) injection 1 mg  Dose: 1 mg  Freq: Once Route: IV  Indications of Use: ALCOHOL WITHDRAWAL SYNDROME  Start: 03/07/24 0930 End: 03/07/24 0936   Admin Instructions:                0936         LORazepam (ATIVAN) tablet 2 mg  Dose: 2 mg  Freq: Once Route: PO  Indications of Use: ALCOHOL WITHDRAWAL SYNDROME  Start: 03/07/24 2045 End: 03/07/24 2044   Admin Instructions:               2044         LORazepam (ATIVAN) tablet 2 mg  Dose: 2 mg  Freq: Once Route: PO  Indications of Use: ALCOHOL WITHDRAWAL SYNDROME  Start: 03/07/24 1415 End: 03/07/24 1412   Admin Instructions:               1412         magnesium Oxide (MAG-OX) tablet 400 mg  Dose: 400 mg  Freq: 2 times daily Route: PO  Start: 03/08/24 0900             0900     1800        magnesium Oxide (MAG-OX) tablet 400 mg  Dose: 400 mg  Freq: 2 times daily Route: PO  Start: 03/07/24 1300 End: 03/07/24 1639            1346     1639-D/C'd       magnesium sulfate 2 g/50 mL IVPB (premix) 2 g  Dose: 2 g  Freq: Once Route: IV  Last Dose: Stopped (03/07/24 1713)  Start: 03/07/24 1430 End: 03/07/24 1713   Admin Instructions:               1513     1713         magnesium sulfate 2 g/50 mL IVPB (premix) 2 g  Dose: 2 g  Freq: Once Route: IV  Last Dose: 2 g (03/07/24 1109)  Start: 03/07/24 1045 End: 03/07/24 1309   Admin Instructions:               1109         metoprolol tartrate (LOPRESSOR) tablet 50 mg  Dose: 50 mg  Freq: Every 12 hours scheduled Route: PO  Start: 03/07/24 1300   Admin Instructions:      Order specific questions:               1346      0027     0900     2100        multivitamin-minerals (CENTRUM) tablet 1 tablet  Dose: 1 tablet  Freq: Daily Route: PO  Start: 03/07/24 1300   Admin Instructions:               1347      0900        nicotine (NICODERM CQ) 21 mg/24 hr TD 24 hr patch 1 patch  Dose: 1 patch  Freq: Daily Route: TD  Start: 03/07/24 1300   Admin Instructions:               1346      0859     0900        ondansetron (ZOFRAN-ODT) dispersible tablet 4 mg  Dose: 4 mg  Freq: Once Route: PO  Start: 03/06/24 2230 End: 03/06/24 2223   Admin Instructions:              2223          pantoprazole (PROTONIX) EC tablet 40 mg  Dose: 40  mg  Freq: 2 times daily before meals Route: PO  Start: 03/07/24 1600   Admin Instructions:               1512      0611     1600        pravastatin (PRAVACHOL) tablet 40 mg  Dose: 40 mg  Freq: Daily with dinner Route: PO  Start: 03/07/24 1630            1530      1630        ranolazine (RANEXA) 12 hr tablet 500 mg  Dose: 500 mg  Freq: Every 12 hours Route: PO  Start: 03/07/24 1300   Admin Instructions:               1346      0027     1300        sodium chloride 0.9 % bolus 1,000 mL  Dose: 1,000 mL  Freq: Once Route: IV  Last Dose: 1,000 mL (03/07/24 0939)  Start: 03/07/24 0930 End: 03/07/24 1139            0939         tamsulosin (FLOMAX) capsule 0.4 mg  Dose: 0.4 mg  Freq: Daily Route: PO  Start: 03/07/24 1300   Admin Instructions:               1346      0900        thiamine tablet 100 mg  Dose: 100 mg  Freq: Daily Route: PO  Start: 03/07/24 1300            1346      0900        varenicline (CHANTIX) tablet 1 mg  Dose: 1 mg  Freq: 2 times daily Route: PO  Start: 03/07/24 1300   Admin Instructions:               1349     1724      0900     1800        Legend:       [id number]/2802/2903/0103/0203/0303/0403/0503/0603/0703/08        Continuous Meds Sorted by Name  for Gregg Partida as of 02/28/24 through 3/8/24  Legend:       Medications 02/28 02/29 03/01 03/02 03/03 03/04 03/05 03/06 03/07 03/08   sodium chloride 0.9 % infusion  Rate: 50 mL/hr Dose: 50 mL/hr  Freq: Continuous Route: IV  Last Dose: 50 mL/hr (03/07/24 1346)  Start: 03/07/24 1300            1346                     PRN Meds Sorted by Name  for Gregg Partida as of 02/28/24 through 3/8/24  Legend:         Medications 02/28 02/29 03/01 03/02 03/03 03/04 03/05 03/06 03/07 03/08   acetaminophen (TYLENOL) tablet 650 mg  Dose: 650 mg  Freq: Every 6 hours PRN Route: PO  PRN Reasons: mild pain,fever,headaches  Start: 03/07/24 2020 2036         albuterol (PROVENTIL HFA,VENTOLIN HFA) inhaler 2 puff  Dose: 2 puff  Freq: Every 4 hours PRN Route:  IN  PRN Reason: wheezing  Start: 03/07/24 1249   Admin Instructions:                   senna-docusate sodium (SENOKOT S) 8.6-50 mg per tablet 1 tablet  Dose: 1 tablet  Freq: Daily PRN Route: PO  PRN Reason: constipation  Start: 03/07/24 1249                trimethobenzamide (TIGAN) IM injection 200 mg  Dose: 200 mg  Freq: Every 6 hours PRN Route: IM  PRN Reasons: nausea,vomiting  Start: 03/07/24 1426   Admin Instructions:                       Network Utilization Review Department  ATTENTION: Please call with any questions or concerns to [phone] and carefully listen to the prompts so that you are directed to the right person. All voicemails are confidential.   For Discharge needs, contact Care Management DC Support Team at [phone] opt. 2  Send all requests for admission clinical reviews, approved or denied determinations and any other requests to dedicated fax number below belonging to the campus where the patient is receiving treatment. List of dedicated fax numbers for the Facilities:  FACILITY NAME UR FAX NUMBER   ADMISSION DENIALS (Administrative/Medical Necessity) [phone]   DISCHARGE SUPPORT TEAM (NETWORK) [phone]   PARENT CHILD HEALTH (Maternity/NICU/Pediatrics) [phone]   [facility] [phone]   [facility] [phone]   [facility] [phone]   [facility] [phone]   [facility] [phone]   [facility] [phone]   [facility] [phone]   [facility] [phone]   [facility] [phone]   [facility] [phone]   [facility] [phone]   [facility] [phone]

## 2024-03-08 NOTE — PLAN OF CARE
Problem: PAIN - ADULT  Goal: Verbalizes/displays adequate comfort level or baseline comfort level  Description: Interventions:  - Encourage patient to monitor pain and request assistance  - Assess pain using appropriate pain scale  - Administer analgesics based on type and severity of pain and evaluate response  - Implement non-pharmacological measures as appropriate and evaluate response  - Consider cultural and social influences on pain and pain management  - Notify physician/advanced practitioner if interventions unsuccessful or patient reports new pain  Outcome: Progressing     Problem: SAFETY ADULT  Goal: Patient will remain free of falls  Description: INTERVENTIONS:  - Educate patient/family on patient safety including physical limitations  - Instruct patient to call for assistance with activity   - Consult OT/PT to assist with strengthening/mobility   - Keep Call bell within reach  - Keep bed low and locked with side rails adjusted as appropriate  - Keep care items and personal belongings within reach  - Initiate and maintain comfort rounds  - Make Fall Risk Sign visible to staff  - Offer Toileting every 2 Hours, in advance of need  - Initiate/Maintain bed alarm  - Obtain necessary fall risk management equipment: walker  - Apply yellow socks and bracelet for high fall risk patients  - Consider moving patient to room near nurses station  Outcome: Progressing  Goal: Maintain or return to baseline ADL function  Description: INTERVENTIONS:  -  Assess patient's ability to carry out ADLs; assess patient's baseline for ADL function and identify physical deficits which impact ability to perform ADLs (bathing, care of mouth/teeth, toileting, grooming, dressing, etc.)  - Assess/evaluate cause of self-care deficits   - Assess range of motion  - Assess patient's mobility; develop plan if impaired  - Assess patient's need for assistive devices and provide as appropriate  - Encourage maximum independence but intervene  and supervise when necessary  - Involve family in performance of ADLs  - Assess for home care needs following discharge   - Consider OT consult to assist with ADL evaluation and planning for discharge  - Provide patient education as appropriate  Outcome: Progressing  Goal: Maintains/Returns to pre admission functional level  Description: INTERVENTIONS:  - Perform AM-PAC 6 Click Basic Mobility/ Daily Activity assessment daily.  - Set and communicate daily mobility goal to care team and patient/family/caregiver.   - Collaborate with rehabilitation services on mobility goals if consulted  - Perform Range of Motion 2 times a day.  - Reposition patient every 2 hours.  - Dangle patient 2 times a day  - Stand patient 2 times a day  - Ambulate patient 2 times a day  - Out of bed to chair 2 times a day   - Out of bed for meals 2 times a day  - Out of bed for toileting  - Record patient progress and toleration of activity level   Outcome: Progressing     Problem: DISCHARGE PLANNING  Goal: Discharge to home or other facility with appropriate resources  Description: INTERVENTIONS:  - Identify barriers to discharge w/patient and caregiver  - Arrange for needed discharge resources and transportation as appropriate  - Identify discharge learning needs (meds, wound care, etc.)  - Arrange for interpretive services to assist at discharge as needed  - Refer to Case Management Department for coordinating discharge planning if the patient needs post-hospital services based on physician/advanced practitioner order or complex needs related to functional status, cognitive ability, or social support system  Outcome: Progressing     Problem: Knowledge Deficit  Goal: Patient/family/caregiver demonstrates understanding of disease process, treatment plan, medications, and discharge instructions  Description: Complete learning assessment and assess knowledge base.  Interventions:  - Provide teaching at level of understanding  - Provide teaching  via preferred learning methods  Outcome: Progressing     Problem: RESPIRATORY - ADULT  Goal: Achieves optimal ventilation and oxygenation  Description: INTERVENTIONS:  - Assess for changes in respiratory status  - Assess for changes in mentation and behavior  - Position to facilitate oxygenation and minimize respiratory effort  - Oxygen administered by appropriate delivery if ordered  - Initiate smoking cessation education as indicated  - Encourage broncho-pulmonary hygiene including cough, deep breathe, Incentive Spirometry  - Assess the need for suctioning and aspirate as needed  - Assess and instruct to report SOB or any respiratory difficulty  - Respiratory Therapy support as indicated  Outcome: Progressing

## 2024-03-08 NOTE — ASSESSMENT & PLAN NOTE
Chronic hyponatremia in setting of alcohol abuse, poor oral intake, and possible SIADH   Sodium on admission 135  Patient is on salt tablets 3 g twice daily at home but states that he gets nauseous whenever he takes salt tablets  03/08 : Na 136    Plan  1800 cc fluid restriction and for him to drink Ensure 1 can twice a day to replace the salt tabs as per nephrology  Trend daily  Continue fluid restriction  
Chronic, stable    Continue pravastatin 40 mg daily  
Chronic, stable    Patient smokes 1 pack of cigarette daily    Continue Breo Ellipta  Continuous pulse oximetry  Continue albuterol inhaler  
Chronic, stable    Plan  Continue 24-hour Tele monitoring  Continue Eliquis  Continue metoprolol 50 mg twice daily  
Chronic, stable smokes 1 pack a day    Nicotine patch 21 mg / 24-hour  
Chronic. Low Mg along with thrombocytopenia might possibly be due to continued alcohol use. Patient's magnesium on admission was 1.3    Plan  Trend daily  Replete as needed   Continue with home magnesium oxide 400 mg twice daily  
Diagnosed with prostate cancer in 2020, status postradiation and resection in 2021    Continue tamsulosin 0.5 mg daily  Monitor for urinary retention  
Hx of CAD s/p CABG  Home meds: Aspirin 81 mg, Eliquis 5 mg BID (was not taking), Pravastatin 40 mg, Ranolazine 500mg     Plan  Continue with aspirin, metoprolol, statin, ranolazine  Eliquis 5 mg twice daily  Continue metoprolol 50 mg every 12 hours  
Lab Results   Component Value Date    EGFR 43 03/07/2024    EGFR 84 02/14/2024    EGFR 92 02/13/2024    CREATININE 1.68 (H) 03/07/2024    CREATININE 0.96 02/14/2024    CREATININE 0.88 02/13/2024     Baseline Creatinine 1.0-1.1  Monitor urine output  Avoid nephrotoxic medication  Monitor creatinine  
Lab Results   Component Value Date    EGFR 54 03/08/2024    EGFR 43 03/07/2024    EGFR 84 02/14/2024    CREATININE 1.38 (H) 03/08/2024    CREATININE 1.68 (H) 03/07/2024    CREATININE 0.96 02/14/2024   .  Cr on admission 1.68. Baseline creatinine around 1 to 1.3. Likely prerenal secondary to dehydration and alcohol abuse. Patient has had previous admissions with [facility] secondary to alcohol abuse.   03/08 : Bladder scan negative    Plan  Avoid nephrotoxic medications as able/ dose medications per GFR  I/Os  Daily weights  Monitor renal function  
Lab Results   Component Value Date    HGBA1C 5.4 01/24/2024       Chronic, controlled.  Patient is on metformin 500 mg daily    Plan  Hold home metformin  Patient started on insulin sliding scale and Accu-Cheks ACHS  Carbohydrate controlled diet  Hypoglycemia protocol  Monitor blood sugars    
PT does not have a CPAP at home due to financial difficulties.     CPAP ordered  
Patient presented to the ED last night because he was not feeling well after drinking alcohol patient states that he drank 1 quart of vodka and couple hours.  States that he drank that because he is depressed but he does not know why he is depressed.  Patient is nauseous, has tremors, palpitation, sweating.  Patient is alert and oriented x 4.  etOH: 91    3/8/24 : Patient still has some shakiness and tremors but denies any nausea, palpitations or sweating.  Patient has received a total of 7 mg of Ativan since admission.     Plan  CIWA protocol activated with Ativan  Continue to take thiamine, folate, multivitamin  Fall precautions  Monitor for withdrawal  Tigan every 6 hours for nausea  
Triple bypass surgery in 2004  Patient denies chest pain  on admission    24-hour continuous telemetry monitoring  
Wt Readings from Last 3 Encounters:   03/07/24 89.8 kg (197 lb 15.6 oz)   02/14/24 89.8 kg (198 lb)   01/27/24 88.5 kg (195 lb 1.7 oz)     Echo 9/23: EF 62%  Patient is euvolemic on examination, does not appear in acute exacerbation.    Continue metoprolol 50 mg every 12  Continue lisinopril 10mg daily  Daily weight   Strict I and O's  
SEVERE

## 2024-03-08 NOTE — CASE MANAGEMENT
Case Management Assessment & Discharge Planning Note    Patient name Gregg Partida  Location [address] [facility]/[address] MRN [id number]  : 1960 Date 3/8/2024       Current Admission Date: 3/6/2024  Current Admission Diagnosis:Alcohol abuse with withdrawal delirium (HCC)   Patient Active Problem List    Diagnosis Date Noted    Elevated troponin 02/10/2024    Hypothyroidism 2024    Troponin level elevated 2024    Abnormal thyroid stimulating hormone level 2023    Chest pain 2023    Paroxysmal atrial fibrillation (HCC) 2023    GERD (gastroesophageal reflux disease) 2023    Sleep apnea 2023    Stable angina 2022    Stage 3a chronic kidney disease (HCC) 2022    Fatty liver, alcoholic 04/15/2022    Nonischemic nontraumatic myocardial injury 04/10/2022    History of atrial fibrillation 10/25/2021    Mild protein-calorie malnutrition (HCC) 2021    Prostate cancer ([facility]) 2021    History of Atrial fibrillation ([facility]) 2020    Chronic heart failure with preserved ejection fraction (HCC) 2019    Alcohol abuse with withdrawal delirium (HCC) 2019    Alcohol abuse with withdrawal ([facility]) 10/17/2019    Hyponatremia 10/17/2019    Cervical spinal stenosis 10/17/2019    Thrombocytopenia ([facility]) 2019    History of prostate cancer 2019    CAD (coronary artery disease) 2019    Acute kidney injury superimposed on CKD  2019    Nicotine abuse 2019    Hypomagnesemia 10/10/2018    Depression, recurrent ([facility]) 10/10/2018    Hx of CABG 10/10/2018    Dyslipidemia 10/10/2018    COPD (chronic obstructive pulmonary disease) ([facility]) 10/09/2018    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 disease ([facility]) 10/09/2018    Hypertensive heart and chronic kidney disease with heart failure and stage 1 through stage 4 chronic kidney disease, or chronic kidney disease ([facility]) 10/09/2018      LOS (days): 0  Geometric Mean LOS (GMLOS)  (days): 3.4  Days to GMLOS:3.1     OBJECTIVE:  PATIENT READMITTED TO HOSPITAL  Risk of Unplanned Readmission Score: 63.16     Current admission status: Inpatient    Preferred Pharmacy:   [facility] [facility] [address] [address]  Phone: [phone] Fax: [phone]    Primary Care Provider: Korin Lo MD    Primary Insurance: AARP MC REP  Secondary Insurance:     ASSESSMENT:  Active Health Care Proxies    There are no active Health Care Proxies on file.       Advance Directives  Does patient currently have a Health Care decision maker?: Yes, please see Health Care Proxy section  Primary Contact: Gregg Partida Jr    Obs Notice Signed: 03/08/24 (Met with pt to explain Observation Status Notice.  Pt signed notice and copy given. Copy also placed in scan bin for chart.)    Readmission Root Cause  30 Day Readmission: Yes  Who directed you to return to the hospital?: Self  Did you understand whom to contact if you had questions or problems?: Yes  Did you get your prescriptions before you left the hospital?: No  Reason:: Preference for own pharmacy  Were you able to get your prescriptions filled when you left the hospital?: Yes  Did you take your medications as prescribed?: Yes  Were you able to get to your follow-up appointments?: No  Reason:: Compliance  During previous admission, was a post-acute recommendation made?: Yes  What post-acute resources were offered?: Other, Offered, but declined (alcohol rehab)  Patient was readmitted due to: alcohol abus with withdrawal delirium  Action Plan: medical management, OORP    Patient Information  Admitted from:: Home  Mental Status: Alert  During Assessment patient was accompanied by: Not accompanied during assessment  Assessment information provided by:: Patient  Primary Caregiver: Family  Caregiver's Name:: Gregg Partida Jr  Caregiver's Relationship to Patient:: Family Member  (son)  Caregiver's Telephone Number:: [phone]  Support Systems: Self, Son  County of Residence: [place]  What city do you live in?: [place]  Home entry access options. Select all that apply.: Elevator  Type of Current Residence: Apartment  Floor Level: 5  Upon entering residence, is there a bedroom on the main floor (no further steps)?: Yes  Upon entering residence, is there a bathroom on the main floor (no further steps)?: Yes  Living Arrangements: Lives Alone    Activities of Daily Living Prior to Admission  Functional Status: Independent  Completes ADLs independently?: Yes  Ambulates independently?: Yes  Does patient use assisted devices?: Yes  Assisted Devices (DME) used: Walker (occassionally)  Does patient currently own DME?: Yes  What DME does the patient currently own?: Walker, Nebulizer  Does patient have a history of Outpatient Therapy (PT/OT)?: No  Does the patient have a history of Short-Term Rehab?: Yes  Does patient have a history of HHC?: No  Does patient currently have HHC?: No    Patient Information Continued  Income Source: SSI/SSD  Does patient have prescription coverage?: Yes ([place] [facility])  Does patient receive dialysis treatments?: No  Does patient have a history of substance abuse?: Yes  Historical substance use preference: Alcohol/ETOH  Is patient currently in treatment for substance abuse?: Yes (per pt he met with [facility], interested in outpatient treatment)  Does patient have a history of Mental Health Diagnosis?: No    Means of Transportation  Means of Transport to Appts:: Public Transportation - Bus (bus, shuttle, cab)      Social Determinants of Health (SDOH)      Flowsheet Row Most Recent Value   Housing Stability    In the last 12 months, was there a time when you were not able to pay the mortgage or rent on time? N   In the last 12 months, how many places have you lived? 1   In the last 12 months, was there a time when you did not have a steady place to  sleep or slept in a shelter (including now)? N   Transportation Needs    In the past 12 months, has lack of transportation kept you from medical appointments or from getting medications? no   In the past 12 months, has lack of transportation kept you from meetings, work, or from getting things needed for daily living? No   Food Insecurity    Within the past 12 months, you worried that your food would run out before you got the money to buy more. Never true   Within the past 12 months, the food you bought just didn't last and you didn't have money to get more. Never true   Utilities    In the past 12 months has the electric, gas, oil, or water company threatened to shut off services in your home? No            DISCHARGE DETAILS:    Discharge planning discussed with:: Patient  Freedom of Choice: Yes  Comments - Freedom of Choice: SW met with pt to assess needs and discuss plans.  Pt's plan is to return home when discharged.  Pt shared that he met with [facility] and is interested in outpatient treatment for his alcohol abuse.  No discharge needs expressed.  Per pt his son is supportive and assists pt as needed.  SW offered ongoing support and assistance to pt.  SW will follow to monitor progress and assist as needed.  CM contacted family/caregiver?: Yes  Were Treatment Team discharge recommendations reviewed with patient/caregiver?: Yes  Did patient/caregiver verbalize understanding of patient care needs?: Yes  Were patient/caregiver advised of the risks associated with not following Treatment Team discharge recommendations?: Yes    Contacts  Patient Contacts: Gregg Partida Jr  Relationship to Patient:: Family (son)  Contact Method: Phone  Phone Number: [phone]  Reason/Outcome: Discharge Planning    Requested Home Health Care         Is the patient interested in HHC at discharge?: No    DME Referral Provided  Referral made for DME?: No    Other Referral/Resources/Interventions Provided:  Interventions:  None Indicated    Treatment Team Recommendation: Home  Discharge Destination Plan:: Home  Transport at Discharge : Family    IMM Given (Date):: 03/08/24 (Initial given by registration)

## 2024-03-09 VITALS
OXYGEN SATURATION: 95 % | BODY MASS INDEX: 25.24 KG/M2 | RESPIRATION RATE: 20 BRPM | DIASTOLIC BLOOD PRESSURE: 90 MMHG | WEIGHT: 196.65 LBS | SYSTOLIC BLOOD PRESSURE: 160 MMHG | HEIGHT: 74 IN | HEART RATE: 73 BPM | TEMPERATURE: 98.2 F

## 2024-03-09 LAB
ALBUMIN SERPL BCP-MCNC: 3.4 G/DL (ref 3.5–5)
ALP SERPL-CCNC: 57 U/L (ref 34–104)
ALT SERPL W P-5'-P-CCNC: 23 U/L (ref 7–52)
ANION GAP SERPL CALCULATED.3IONS-SCNC: 7 MMOL/L
AST SERPL W P-5'-P-CCNC: 29 U/L (ref 13–39)
BASOPHILS # BLD AUTO: 0.04 THOUSANDS/ÂΜL (ref 0–0.1)
BASOPHILS NFR BLD AUTO: 1 % (ref 0–1)
BILIRUB SERPL-MCNC: 0.66 MG/DL (ref 0.2–1)
BUN SERPL-MCNC: 27 MG/DL (ref 5–25)
CALCIUM ALBUM COR SERPL-MCNC: 9.2 MG/DL (ref 8.3–10.1)
CALCIUM SERPL-MCNC: 8.7 MG/DL (ref 8.4–10.2)
CHLORIDE SERPL-SCNC: 99 MMOL/L (ref 96–108)
CO2 SERPL-SCNC: 26 MMOL/L (ref 21–32)
CREAT SERPL-MCNC: 1.11 MG/DL (ref 0.6–1.3)
EOSINOPHIL # BLD AUTO: 0.23 THOUSAND/ÂΜL (ref 0–0.61)
EOSINOPHIL NFR BLD AUTO: 4 % (ref 0–6)
ERYTHROCYTE [DISTWIDTH] IN BLOOD BY AUTOMATED COUNT: 14.5 % (ref 11.6–15.1)
GFR SERPL CREATININE-BSD FRML MDRD: 71 ML/MIN/1.73SQ M
GLUCOSE SERPL-MCNC: 101 MG/DL (ref 65–140)
GLUCOSE SERPL-MCNC: 109 MG/DL (ref 65–140)
GLUCOSE SERPL-MCNC: 111 MG/DL (ref 65–140)
GLUCOSE SERPL-MCNC: 91 MG/DL (ref 65–140)
HCT VFR BLD AUTO: 33.1 % (ref 36.5–49.3)
HGB BLD-MCNC: 11.2 G/DL (ref 12–17)
IMM GRANULOCYTES # BLD AUTO: 0.02 THOUSAND/UL (ref 0–0.2)
IMM GRANULOCYTES NFR BLD AUTO: 0 % (ref 0–2)
LYMPHOCYTES # BLD AUTO: 1.1 THOUSANDS/ÂΜL (ref 0.6–4.47)
LYMPHOCYTES NFR BLD AUTO: 17 % (ref 14–44)
MAGNESIUM SERPL-MCNC: 1.5 MG/DL (ref 1.9–2.7)
MCH RBC QN AUTO: 33.1 PG (ref 26.8–34.3)
MCHC RBC AUTO-ENTMCNC: 33.8 G/DL (ref 31.4–37.4)
MCV RBC AUTO: 98 FL (ref 82–98)
MONOCYTES # BLD AUTO: 0.78 THOUSAND/ÂΜL (ref 0.17–1.22)
MONOCYTES NFR BLD AUTO: 12 % (ref 4–12)
NEUTROPHILS # BLD AUTO: 4.16 THOUSANDS/ÂΜL (ref 1.85–7.62)
NEUTS SEG NFR BLD AUTO: 66 % (ref 43–75)
NRBC BLD AUTO-RTO: 0 /100 WBCS
PHOSPHATE SERPL-MCNC: 2 MG/DL (ref 2.3–4.1)
PLATELET # BLD AUTO: 61 THOUSANDS/UL (ref 149–390)
PMV BLD AUTO: 11.5 FL (ref 8.9–12.7)
POTASSIUM SERPL-SCNC: 4 MMOL/L (ref 3.5–5.3)
PROT SERPL-MCNC: 6.3 G/DL (ref 6.4–8.4)
RBC # BLD AUTO: 3.38 MILLION/UL (ref 3.88–5.62)
SODIUM SERPL-SCNC: 132 MMOL/L (ref 135–147)
WBC # BLD AUTO: 6.33 THOUSAND/UL (ref 4.31–10.16)

## 2024-03-09 PROCEDURE — 99239 HOSP IP/OBS DSCHRG MGMT >30: CPT | Performed by: FAMILY MEDICINE

## 2024-03-09 PROCEDURE — 82948 REAGENT STRIP/BLOOD GLUCOSE: CPT

## 2024-03-09 PROCEDURE — 80053 COMPREHEN METABOLIC PANEL: CPT

## 2024-03-09 PROCEDURE — 83735 ASSAY OF MAGNESIUM: CPT

## 2024-03-09 PROCEDURE — 85025 COMPLETE CBC W/AUTO DIFF WBC: CPT

## 2024-03-09 PROCEDURE — 84100 ASSAY OF PHOSPHORUS: CPT

## 2024-03-09 RX ORDER — LISINOPRIL 20 MG/1
20 TABLET ORAL DAILY
Status: DISCONTINUED | OUTPATIENT
Start: 2024-03-09 | End: 2024-03-09 | Stop reason: HOSPADM

## 2024-03-09 RX ORDER — MAGNESIUM SULFATE HEPTAHYDRATE 40 MG/ML
2 INJECTION, SOLUTION INTRAVENOUS ONCE
Status: COMPLETED | OUTPATIENT
Start: 2024-03-09 | End: 2024-03-09

## 2024-03-09 RX ADMIN — TAMSULOSIN HYDROCHLORIDE 0.4 MG: 0.4 CAPSULE ORAL at 08:13

## 2024-03-09 RX ADMIN — METOPROLOL TARTRATE 50 MG: 50 TABLET, FILM COATED ORAL at 08:13

## 2024-03-09 RX ADMIN — FLUTICASONE FUROATE AND VILANTEROL TRIFENATATE 1 PUFF: 200; 25 POWDER RESPIRATORY (INHALATION) at 08:14

## 2024-03-09 RX ADMIN — VARENICLINE 1 MG: 0.5 TABLET, FILM COATED ORAL at 08:14

## 2024-03-09 RX ADMIN — SODIUM PHOSPHATE, MONOBASIC, MONOHYDRATE AND SODIUM PHOSPHATE, DIBASIC, ANHYDROUS 6 MMOL: 142; 276 INJECTION, SOLUTION INTRAVENOUS at 11:08

## 2024-03-09 RX ADMIN — ASPIRIN 81 MG 81 MG: 81 TABLET ORAL at 08:14

## 2024-03-09 RX ADMIN — RANOLAZINE 500 MG: 500 TABLET, FILM COATED, EXTENDED RELEASE ORAL at 13:29

## 2024-03-09 RX ADMIN — GABAPENTIN 300 MG: 300 CAPSULE ORAL at 08:13

## 2024-03-09 RX ADMIN — PRAVASTATIN SODIUM 40 MG: 40 TABLET ORAL at 16:18

## 2024-03-09 RX ADMIN — PANTOPRAZOLE SODIUM 40 MG: 40 TABLET, DELAYED RELEASE ORAL at 16:18

## 2024-03-09 RX ADMIN — FERROUS SULFATE TAB 325 MG (65 MG ELEMENTAL FE) 325 MG: 325 (65 FE) TAB at 08:13

## 2024-03-09 RX ADMIN — GABAPENTIN 300 MG: 300 CAPSULE ORAL at 16:18

## 2024-03-09 RX ADMIN — Medication 400 MG: at 08:13

## 2024-03-09 RX ADMIN — RANOLAZINE 500 MG: 500 TABLET, FILM COATED, EXTENDED RELEASE ORAL at 00:21

## 2024-03-09 RX ADMIN — PANTOPRAZOLE SODIUM 40 MG: 40 TABLET, DELAYED RELEASE ORAL at 06:51

## 2024-03-09 RX ADMIN — NICOTINE 1 PATCH: 21 PATCH, EXTENDED RELEASE TRANSDERMAL at 08:13

## 2024-03-09 RX ADMIN — Medication 1 TABLET: at 08:13

## 2024-03-09 RX ADMIN — MAGNESIUM SULFATE HEPTAHYDRATE 2 G: 40 INJECTION, SOLUTION INTRAVENOUS at 11:08

## 2024-03-09 RX ADMIN — THIAMINE HCL TAB 100 MG 100 MG: 100 TAB at 08:13

## 2024-03-09 RX ADMIN — FOLIC ACID 1000 MCG: 1 TABLET ORAL at 08:13

## 2024-03-09 RX ADMIN — LISINOPRIL 20 MG: 20 TABLET ORAL at 11:08

## 2024-03-09 RX ADMIN — APIXABAN 5 MG: 5 TABLET, FILM COATED ORAL at 08:13

## 2024-03-09 NOTE — DISCHARGE SUMMARY
Discharge Summary - [facility]     Patient Information: Gregg Partida 63 y.o. male MRN: [id number]  Unit/Bed#: [address] [place] [address] Encounter: [id number]     Admitting Physician: Brandy Connors DO  Discharging Physician/Practitioner: Brandy Connors DO   PCP: Korin Lo MD  Admission Date:   Admission Orders (From admission, onward)       Ordered        03/08/24 0910  Inpatient Admission  Once            03/07/24 1108  Place in Observation  Once                          Discharge Date: 03/09/24      Reason for Admission: Alcohol intoxication (HCC) [F10.929]  Alcoholic intoxication without complication (HCC) [F10.920]     Discharge Diagnoses:      Principal Problem:    Alcohol abuse with withdrawal delirium (HCC)  Active Problems:    COPD (chronic obstructive pulmonary disease) (HCC)    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 disease (HCC)    Hypomagnesemia    Hx of CABG    Dyslipidemia    History of prostate cancer    CAD (coronary artery disease)    Acute kidney injury superimposed on CKD     Nicotine abuse    Hyponatremia    Chronic heart failure with preserved ejection fraction (HCC)    Stage 3a chronic kidney disease (HCC)    Paroxysmal atrial fibrillation (HCC)    Sleep apnea  Resolved Problems:    * No resolved hospital problems. *       Consultations During Hospital Stay:  -none     Procedures Performed:   -none     Significant Findings / Test Results:   -3/9 hgb 11.2, platelets 61, Na 132, Cr 1.11, AST 29, ALT 23, mag 1.6, phosphorus 2.0  -3/8: hgb 11.6, platelets 69, Cr 1.38, AST WNL, ALT WNL, mag 1.8  -3/7: hgb 13.1, platelets 97, Cr 1.68, AST 62, ALT WNL, mag 1.3, ethanol 91     Incidental Findings:   -none     Test Results Pending at Discharge (will require follow up):   -none     Outpatient Tests Requested:  ·       none     Outpatient follow-up Requested:  -PCP  -Psychiatry    Complications:    none    Things to address at first visit after  "hospitalization   -Are you still consuming alcohol?  -Are you having any signs of withdrawal?    Hospital Course:      Gregg Partida is a 63 y.o. male patient who originally presented to the hospital on 3/6/2024 due to \"feeling unwell\" after significant alcohol consumption. Past medical history includes T2DM, a-fib on eliquis, HLD, CKD, CHF. Originally reporting that he wanted to present to outpt rehab, when was offered , he then declined. Monitored for significant withdrawal symptom and treated with medication. Now appropriate and stable for discharge home. Patient states that he needs to get some affairs into order before going to rehab.    * Alcohol abuse with withdrawal delirium (HCC)  Assessment & Plan  Patient presented to the ED due to not feeling well after drinking alcohol patient states that he drank 1 quart of vodka and couple hours.  States that he drank that because he is depressed but he does not know why he is depressed.  Patient is nauseous, has tremors, palpitation, sweating. Ethanol 91 at admission. CIWA protocol activated with Ativan throughout admission    Continue to take thiamine, folate, multivitamin  Fall precautions  Monitor for withdrawal  Counseled on decreasing and ceasing alcohol consumption   Follow-up with psychiatry for depression     Sleep apnea  Assessment & Plan  PT does not have a CPAP at home due to financial difficulties. Placed message to case management to assist with matter.     Paroxysmal atrial fibrillation (HCC)  Assessment & Plan  Chronic, stable    Patient was monitored on telemetry throughout admission. Rhythm but not rate controlled.     Plan  Continue Eliquis  Continue metoprolol 50 mg twice daily    Stage 3a chronic kidney disease (HCC)    Baseline Creatinine 1.0-1.1. Creatinine initially elevated at 1.68 but decreased to baseline range 1.11 after gentle hydration. Urine output was monitored and lipitor was held and then resumed after creatine returned to " baseline range.     Chronic heart failure with preserved ejection fraction (HCC)  Assessment & Plan  Echo 9/23: EF 62%  Patient is euvolemic on examination, does not appear in acute exacerbation.    Continue metoprolol 50 mg every 12  Continue lisinopril 10mg daily  Daily weight   Strict I and O's    Hyponatremia  Assessment & Plan  Chronic hyponatremia in setting of alcohol abuse, poor oral intake, and possible SIADH   Sodium on admission 135  Patient is on salt tablets 3 g twice daily at home but states that he gets nauseous whenever he takes salt tablets  03/08 : Na 136  03/09: 132    Plan  1800 cc fluid restriction and for him to drink Ensure 1 can twice a day to replace the salt tabs as per nephrology    Nicotine abuse  Assessment & Plan  Chronic, stable smokes 1 pack a day    Nicotine patch 21 mg / 24-hour    Acute kidney injury superimposed on CKD   Assessment & Plan  Cr on admission 1.68. Baseline creatinine around 1 to 1.3. Likely prerenal secondary to dehydration and alcohol abuse. Patient has had previous admissions with [facility] secondary to alcohol abuse.   03/08 : Bladder scan negative    Plan  Avoid nephrotoxic medications as able/ dose medications per GFR  I/Os  Daily weights  Monitor renal function    CAD (coronary artery disease)  Assessment & Plan  Hx of CAD s/p CABG  Home meds: Aspirin 81 mg, Eliquis 5 mg BID (was not taking), Pravastatin 40 mg, Ranolazine 500mg     Plan  Continue with aspirin, metoprolol, statin, ranolazine  Eliquis 5 mg twice daily  Continue metoprolol 50 mg every 12 hours    History of prostate cancer  Assessment & Plan  Diagnosed with prostate cancer in 2020, status postradiation and resection in 2021    Continue tamsulosin 0.5 mg daily  Monitor for urinary retention    Dyslipidemia  Assessment & Plan  Chronic, stable    Continue pravastatin 40 mg daily    Hx of CABG  Assessment & Plan  Triple bypass surgery in 2004  Patient denies chest pain  on  "admission    Hypomagnesemia  Assessment & Plan  Chronic. Low Mg along with thrombocytopenia might possibly be due to continued alcohol use. Patient's magnesium on admission was 1.3, checked daily during admission and repleted as needed.    Plan  Continue with home magnesium oxide 400 mg twice daily    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 disease ([facility])  Assessment & Plan  Lab Results   Component Value Date    HGBA1C 5.4 01/24/2024       Chronic, controlled.  Patient is on metformin 500 mg daily    Plan:   Patient started on insulin sliding scale and Accu-Cheks ACHS  Carbohydrate controlled diet  Hypoglycemia protocol  Monitor blood sugars  Continue metformin 500 mg daily      COPD (chronic obstructive pulmonary disease) ([facility])  Assessment & Plan  Chronic, stable    Patient smokes 1 pack of cigarette daily    Continue Breo Ellipta  Continuous pulse oximetry  Continue albuterol inhaler           Condition at Discharge: stable      Discharge Day Visit / Exam:      Vitals: Blood Pressure: 160/90 (03/09/24 1507)  Pulse: 73 (03/09/24 1507)  Temperature: 98.2 °F (36.8 °C) (03/09/24 0800)  Temp Source: Oral (03/09/24 0800)  Respirations: 20 (03/09/24 1507)  Height: 6' 2\" (188 cm) (03/07/24 1322)  Weight - Scale: 89.2 kg (196 lb 10.4 oz) (03/09/24 0507)  SpO2: 95 % (03/09/24 1507)  Exam:   Physical Exam  Constitutional:       Appearance: Normal appearance.   HENT:      Head: Normocephalic and atraumatic.   Eyes:      General:         Right eye: No discharge.         Left eye: No discharge.      Conjunctiva/sclera: Conjunctivae normal.   Cardiovascular:      Rate and Rhythm: Normal rate and regular rhythm.      Pulses: Normal pulses.      Heart sounds: Normal heart sounds. No murmur heard.  Pulmonary:      Effort: Pulmonary effort is normal. No respiratory distress.      Breath sounds: Normal breath sounds.   Abdominal:      General: Bowel sounds are normal.      Palpations: Abdomen is soft.      Tenderness: " There is no abdominal tenderness.   Musculoskeletal:      Cervical back: Normal range of motion and neck supple.   Skin:     General: Skin is warm and dry.      Capillary Refill: Capillary refill takes less than 2 seconds.   Neurological:      Mental Status: He is alert.      Comments: Tremors noted on outstretched bilateral arms.           Patient Information Sharing: With the consent of Gregg Partida, their loved ones were notified today by inpatient team of the patient’s condition and current plan.  All questions answered.      Discharge instructions/Information to patient and family:   See after visit summary for information provided to patient and family.       Discharge Medications:     Medication List      CONTINUE taking these medications     Adult Blood Pressure Cuff Lg Kit; Use in the morning   albuterol 90 mcg/act inhaler; Commonly known as: Ventolin HFA; Inhale 2   puffs every 4 (four) hours as needed for wheezing   aluminum-magnesium hydroxide-simethicone [id number] mg/30 mL   suspension; Commonly known as: MAALOX; Take 30 mL by mouth every 4 (four)   hours as needed for indigestion or heartburn   apixaban 5 mg; Commonly known as: ELIQUIS; Take 1 tablet (5 mg total) by   mouth 2 (two) times a day Do not start before January 31, 2024.   aspirin 81 mg EC tablet; Commonly known as: ECOTRIN LOW STRENGTH   Breo Ellipta 200-25 MCG/ACT inhaler; Generic drug:   fluticasone-vilanterol; Inhale 1 puff daily Rinse mouth after use.   ferrous sulfate 325 (65 Fe) mg tablet; Take 1 tablet (325 mg total) by   mouth daily with breakfast   folic acid 1 mg tablet; Commonly known as: FOLVITE; TAKE 1 TABLET DAILY   gabapentin 300 mg capsule; Commonly known as: NEURONTIN; TAKE ONE   CAPSULE THREE TIMES DAILY   lisinopril 20 mg tablet; Commonly known as: ZESTRIL; Take 0.5 tablets   (10 mg total) by mouth daily   magnesium Oxide 400 mg Tabs; Commonly known as: MAG-OX; Take 1 tablet   (400 mg total) by mouth 2 (two) times a  day   metFORMIN 500 mg tablet; Commonly known as: GLUCOPHAGE; Take 1 tablet   (500 mg total) by mouth daily with breakfast Do not start before September 23, 2023.   metoprolol tartrate 50 mg tablet; Commonly known as: LOPRESSOR; Take 1   tablet (50 mg total) by mouth every 12 (twelve) hours   naltrexone 50 mg tablet; Commonly known as: REVIA; Take 1 tablet (50 mg   total) by mouth daily   nicotine 21 mg/24 hr TD 24 hr patch; Commonly known as: NICODERM CQ;   Place 1 patch on the skin over 24 hours daily Do not start before December 4, 2023.   ONE TOUCH ULTRA MINI w/Device Kit   OneTouch Delica Lancets Fine Misc   pantoprazole 40 mg tablet; Commonly known as: PROTONIX; TAKE 1 TABLET   TWO TIMES A DAY   pravastatin 40 mg tablet; Commonly known as: PRAVACHOL; TAKE 1 TABLET   DAILY WITH DINNER   ranolazine 500 mg 12 hr tablet; Commonly known as: RANEXA; TAKE 1 TABLET   EVERY 12 HOURS   senna-docusate sodium 8.6-50 mg per tablet; Commonly known as: SENOKOT   S; Take 1 tablet by mouth daily as needed for constipation   tamsulosin 0.4 mg; Commonly known as: FLOMAX; TAKE 1 CAPSULE DAILY   varenicline 1 mg tablet; Commonly known as: CHANTIX; TAKE 1 TABLET 2   TIMES A DAY   vitamin B-1 100 MG Tabs; TAKE 1 TABLET DAILY        Disposition:   Home        Discharge Statement:  I spent 45 minutes discharging the patient. This time was spent on the day of discharge. I had direct contact with the patient on the day of discharge. Greater than 50% of the total time was spent examining patient, answering all patient questions, arranging and discussing plan of care with patient as well as directly providing post-discharge instructions.  Additional time then spent on discharge activities.     ** Please Note: This note has been constructed using a voice recognition system **     Antonino Moscoso MD   03/09/24  3:36 PM

## 2024-03-09 NOTE — ED CARE HANDOFF
[facility] Warm Handoff Outcome Note    Patient name Gregg Partida  Location 2 [address] MRN [id number]  Age: 63 y.o.          Plan Type:  Warm Handoff                                                                                    Plan Date: 3/6/2024  Service:  ED Warm Handoff      Substance Use History:  ETOH    Warm Handoff Update:  Cl admitted to hospital for detox    Warm Handoff Outcome: Withdrawal Management

## 2024-03-09 NOTE — NURSING NOTE
Patient discharged to home. Discharge instructions reviewed with patient, including next appointments and medications reinforcing patient's fluid restriction. IV discontinued from arm, pressure dressing in place. Pt stable at time of discharge.

## 2024-03-09 NOTE — PLAN OF CARE
Problem: PAIN - ADULT  Goal: Verbalizes/displays adequate comfort level or baseline comfort level  Description: Interventions:  - Encourage patient to monitor pain and request assistance  - Assess pain using appropriate pain scale  - Administer analgesics based on type and severity of pain and evaluate response  - Implement non-pharmacological measures as appropriate and evaluate response  - Consider cultural and social influences on pain and pain management  - Notify physician/advanced practitioner if interventions unsuccessful or patient reports new pain  Outcome: Progressing     Problem: SAFETY ADULT  Goal: Patient will remain free of falls  Description: INTERVENTIONS:  - Educate patient/family on patient safety including physical limitations  - Instruct patient to call for assistance with activity   - Consult OT/PT to assist with strengthening/mobility   - Keep Call bell within reach  - Keep bed low and locked with side rails adjusted as appropriate  - Keep care items and personal belongings within reach  - Initiate and maintain comfort rounds  - Make Fall Risk Sign visible to staff  - Offer Toileting every 2 Hours, in advance of need  - Initiate/Maintain bed alarm  - Obtain necessary fall risk management equipment: yellow socks  - Apply yellow socks and bracelet for high fall risk patients  - Consider moving patient to room near nurses station  Outcome: Progressing  Goal: Maintain or return to baseline ADL function  Description: INTERVENTIONS:  -  Assess patient's ability to carry out ADLs; assess patient's baseline for ADL function and identify physical deficits which impact ability to perform ADLs (bathing, care of mouth/teeth, toileting, grooming, dressing, etc.)  - Assess/evaluate cause of self-care deficits   - Assess range of motion  - Assess patient's mobility; develop plan if impaired  - Assess patient's need for assistive devices and provide as appropriate  - Encourage maximum independence but  intervene and supervise when necessary  - Involve family in performance of ADLs  - Assess for home care needs following discharge   - Consider OT consult to assist with ADL evaluation and planning for discharge  - Provide patient education as appropriate  Outcome: Progressing  Goal: Maintains/Returns to pre admission functional level  Description: INTERVENTIONS:  - Perform AM-PAC 6 Click Basic Mobility/ Daily Activity assessment daily.  - Set and communicate daily mobility goal to care team and patient/family/caregiver.   - Collaborate with rehabilitation services on mobility goals if consulted  - Perform Range of Motion 3 times a day.  - Reposition patient every 2 hours.  - Dangle patient 3 times a day  - Stand patient 3 times a day  - Ambulate patient 3 times a day  - Out of bed to chair 3 times a day   - Out of bed for meals 3 times a day  - Out of bed for toileting  - Record patient progress and toleration of activity level   Outcome: Progressing     Problem: DISCHARGE PLANNING  Goal: Discharge to home or other facility with appropriate resources  Description: INTERVENTIONS:  - Identify barriers to discharge w/patient and caregiver  - Arrange for needed discharge resources and transportation as appropriate  - Identify discharge learning needs (meds, wound care, etc.)  - Arrange for interpretive services to assist at discharge as needed  - Refer to Case Management Department for coordinating discharge planning if the patient needs post-hospital services based on physician/advanced practitioner order or complex needs related to functional status, cognitive ability, or social support system  Outcome: Progressing     Problem: Knowledge Deficit  Goal: Patient/family/caregiver demonstrates understanding of disease process, treatment plan, medications, and discharge instructions  Description: Complete learning assessment and assess knowledge base.  Interventions:  - Provide teaching at level of understanding  - Provide  teaching via preferred learning methods  Outcome: Progressing     Problem: RESPIRATORY - ADULT  Goal: Achieves optimal ventilation and oxygenation  Description: INTERVENTIONS:  - Assess for changes in respiratory status  - Assess for changes in mentation and behavior  - Position to facilitate oxygenation and minimize respiratory effort  - Oxygen administered by appropriate delivery if ordered  - Initiate smoking cessation education as indicated  - Encourage broncho-pulmonary hygiene including cough, deep breathe, Incentive Spirometry  - Assess the need for suctioning and aspirate as needed  - Assess and instruct to report SOB or any respiratory difficulty  - Respiratory Therapy support as indicated  Outcome: Progressing

## 2024-03-09 NOTE — DISCHARGE INSTR - AVS FIRST PAGE
Follow fluid restriction of 1800 ml per day, which includes alcohol and water. Try to decrease and cease daily alcohol consumption.     Continue thiamine, folic acid, multivitamin.     If going to alcohol rehab is still your wish, go to rehab facility as soon as possible after discharge     Drink Ensure nutritional shake twice daily. If you are unable to get ensure, you can take 1 gram salt tablet three times a day.     Follow-up with primary care provider, nephrology and psychiatry.

## 2024-03-10 NOTE — CASE MANAGEMENT
Case Management Discharge Planning Note    Patient name Gregg Partida  Location 2 [facility] 208/2 [facility] 208 MRN [id number]  : 1960 Date 3/10/2024       Current Admission Date: 3/6/2024  Current Admission Diagnosis:Alcohol abuse with withdrawal delirium (HCC)   Patient Active Problem List    Diagnosis Date Noted    Elevated troponin 02/10/2024    Hypothyroidism 2024    Troponin level elevated 2024    Abnormal thyroid stimulating hormone level 2023    Chest pain 2023    Paroxysmal atrial fibrillation (HCC) 2023    GERD (gastroesophageal reflux disease) 2023    Sleep apnea 2023    Stable angina 2022    Stage 3a chronic kidney disease (HCC) 2022    Fatty liver, alcoholic 04/15/2022    Nonischemic nontraumatic myocardial injury 04/10/2022    History of atrial fibrillation 10/25/2021    Mild protein-calorie malnutrition ([facility]) 2021    Prostate cancer ([facility]) 2021    History of Atrial fibrillation ([facility]) 2020    Chronic heart failure with preserved ejection fraction ([facility]) 2019    Alcohol abuse with withdrawal delirium ([facility]) 2019    Alcohol abuse with withdrawal ([facility]) 10/17/2019    Hyponatremia 10/17/2019    Cervical spinal stenosis 10/17/2019    Thrombocytopenia ([facility]) 2019    History of prostate cancer 2019    CAD (coronary artery disease) 2019    Acute kidney injury superimposed on CKD  2019    Nicotine abuse 2019    Hypomagnesemia 10/10/2018    Depression, recurrent ([facility]) 10/10/2018    Hx of CABG 10/10/2018    Dyslipidemia 10/10/2018    COPD (chronic obstructive pulmonary disease) ([facility]) 10/09/2018    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 disease ([facility]) 10/09/2018    Hypertensive heart and chronic kidney disease with heart failure and stage 1 through stage 4 chronic kidney disease, or chronic kidney disease ([facility]) 10/09/2018      LOS (days): 1  Geometric Mean LOS (GMLOS) (days): 3.4  Days to  GMLOS:2     OBJECTIVE:  Risk of Unplanned Readmission Score: 57.49         Current admission status: Inpatient   Preferred Pharmacy:   [address] [address]  Phone: [phone] Fax: [phone]    Primary Care Provider: Korin Lo MD    Primary Insurance: [employer] RE[employer]  Secondary Insurance:     DISCHARGE DETAILS:    CM made aware by nursing patient will need [employer] transportation at discharge.       Transport at Discharge : Ride Share  Dispatcher Contacted: Yes  Number/Name of Dispatcher: [employer]  Transported by (Company and Unit #): [employer]  ETA of Transport (Date): 03/09/24     Transport Service Arrived: Yes  Transfer Mode: Ambulate

## 2024-03-12 ENCOUNTER — TELEPHONE (OUTPATIENT)
Dept: PSYCHIATRY | Facility: CLINIC | Age: 64
End: 2024-03-12

## 2024-03-25 ENCOUNTER — HOSPITAL ENCOUNTER (EMERGENCY)
Facility: HOSPITAL | Age: 64
End: 2024-03-25
Attending: EMERGENCY MEDICINE
Payer: COMMERCIAL

## 2024-03-25 ENCOUNTER — HOSPITAL ENCOUNTER (INPATIENT)
Facility: HOSPITAL | Age: 64
LOS: 1 days | Discharge: HOME | DRG: 315 | End: 2024-03-26
Attending: INTERNAL MEDICINE | Admitting: INTERNAL MEDICINE
Payer: COMMERCIAL

## 2024-03-25 ENCOUNTER — HOSPITAL ENCOUNTER (INPATIENT)
Facility: HOSPITAL | Age: 64
LOS: 1 days | DRG: 897 | End: 2024-03-25
Attending: EMERGENCY MEDICINE | Admitting: EMERGENCY MEDICINE
Payer: COMMERCIAL

## 2024-03-25 ENCOUNTER — APPOINTMENT (EMERGENCY)
Dept: RADIOLOGY | Facility: HOSPITAL | Age: 64
End: 2024-03-25
Payer: COMMERCIAL

## 2024-03-25 VITALS
DIASTOLIC BLOOD PRESSURE: 44 MMHG | WEIGHT: 190 LBS | SYSTOLIC BLOOD PRESSURE: 75 MMHG | BODY MASS INDEX: 24.38 KG/M2 | TEMPERATURE: 98.1 F | RESPIRATION RATE: 18 BRPM | OXYGEN SATURATION: 87 % | HEIGHT: 74 IN | HEART RATE: 76 BPM

## 2024-03-25 VITALS
BODY MASS INDEX: 24.81 KG/M2 | OXYGEN SATURATION: 97 % | WEIGHT: 193.34 LBS | TEMPERATURE: 97.5 F | RESPIRATION RATE: 18 BRPM | SYSTOLIC BLOOD PRESSURE: 104 MMHG | DIASTOLIC BLOOD PRESSURE: 55 MMHG | HEIGHT: 74 IN | HEART RATE: 114 BPM

## 2024-03-25 DIAGNOSIS — E87.1 HYPONATREMIA: ICD-10-CM

## 2024-03-25 DIAGNOSIS — N17.9 ACUTE KIDNEY INJURY SUPERIMPOSED ON CKD  (HCC): ICD-10-CM

## 2024-03-25 DIAGNOSIS — F10.131: Primary | ICD-10-CM

## 2024-03-25 DIAGNOSIS — F10.10 ALCOHOL ABUSE: Primary | ICD-10-CM

## 2024-03-25 DIAGNOSIS — F10.929 ALCOHOL INTOXICATION (HCC): Primary | ICD-10-CM

## 2024-03-25 DIAGNOSIS — N17.9 AKI (ACUTE KIDNEY INJURY) (HCC): ICD-10-CM

## 2024-03-25 DIAGNOSIS — N18.9 ACUTE KIDNEY INJURY SUPERIMPOSED ON CKD  (HCC): ICD-10-CM

## 2024-03-25 DIAGNOSIS — F10.10 ALCOHOL ABUSE: ICD-10-CM

## 2024-03-25 PROBLEM — N30.91 HEMORRHAGIC CYSTITIS: Status: ACTIVE | Noted: 2024-03-25

## 2024-03-25 PROBLEM — F10.939 ALCOHOL WITHDRAWAL SYNDROME WITH COMPLICATION (HCC): Status: ACTIVE | Noted: 2019-10-17

## 2024-03-25 PROBLEM — I10 HYPERTENSION: Status: ACTIVE | Noted: 2024-03-25

## 2024-03-25 PROBLEM — I95.9 HYPOTENSION: Status: ACTIVE | Noted: 2024-03-25

## 2024-03-25 LAB
ALBUMIN SERPL BCP-MCNC: 3.6 G/DL (ref 3.5–5)
ALBUMIN SERPL BCP-MCNC: 3.8 G/DL (ref 3.5–5)
ALBUMIN SERPL BCP-MCNC: 3.8 G/DL (ref 3.5–5)
ALP SERPL-CCNC: 67 U/L (ref 34–104)
ALP SERPL-CCNC: 71 U/L (ref 34–104)
ALP SERPL-CCNC: 84 U/L (ref 34–104)
ALT SERPL W P-5'-P-CCNC: 47 U/L (ref 7–52)
ALT SERPL W P-5'-P-CCNC: 50 U/L (ref 7–52)
ALT SERPL W P-5'-P-CCNC: 56 U/L (ref 7–52)
AMPHETAMINES SERPL QL SCN: NEGATIVE
ANION GAP SERPL CALCULATED.3IONS-SCNC: 14 MMOL/L (ref 4–13)
ANION GAP SERPL CALCULATED.3IONS-SCNC: 14 MMOL/L (ref 4–13)
ANION GAP SERPL CALCULATED.3IONS-SCNC: 16 MMOL/L (ref 4–13)
ANISOCYTOSIS BLD QL SMEAR: PRESENT
AST SERPL W P-5'-P-CCNC: 88 U/L (ref 13–39)
AST SERPL W P-5'-P-CCNC: 89 U/L (ref 13–39)
AST SERPL W P-5'-P-CCNC: 97 U/L (ref 13–39)
ATRIAL RATE: 0 BPM
ATRIAL RATE: 102 BPM
ATRIAL RATE: 87 BPM
BACTERIA UR QL AUTO: ABNORMAL /HPF
BARBITURATES UR QL: NEGATIVE
BASOPHILS # BLD MANUAL: 0.06 THOUSAND/UL (ref 0–0.1)
BASOPHILS NFR MAR MANUAL: 1 % (ref 0–1)
BENZODIAZ UR QL: NEGATIVE
BILIRUB SERPL-MCNC: 0.82 MG/DL (ref 0.2–1)
BILIRUB SERPL-MCNC: 0.9 MG/DL (ref 0.2–1)
BILIRUB SERPL-MCNC: 1.02 MG/DL (ref 0.2–1)
BILIRUB UR QL STRIP: ABNORMAL
BUN SERPL-MCNC: 22 MG/DL (ref 5–25)
BUN SERPL-MCNC: 23 MG/DL (ref 5–25)
BUN SERPL-MCNC: 24 MG/DL (ref 5–25)
CALCIUM SERPL-MCNC: 7.6 MG/DL (ref 8.4–10.2)
CALCIUM SERPL-MCNC: 7.8 MG/DL (ref 8.4–10.2)
CALCIUM SERPL-MCNC: 8.5 MG/DL (ref 8.4–10.2)
CARDIAC TROPONIN I PNL SERPL HS: 54 NG/L
CHLORIDE SERPL-SCNC: 94 MMOL/L (ref 96–108)
CHLORIDE SERPL-SCNC: 97 MMOL/L (ref 96–108)
CHLORIDE SERPL-SCNC: 99 MMOL/L (ref 96–108)
CLARITY UR: ABNORMAL
CO2 SERPL-SCNC: 20 MMOL/L (ref 21–32)
CO2 SERPL-SCNC: 22 MMOL/L (ref 21–32)
CO2 SERPL-SCNC: 22 MMOL/L (ref 21–32)
COCAINE UR QL: NEGATIVE
COLOR UR: YELLOW
CREAT SERPL-MCNC: 1.67 MG/DL (ref 0.6–1.3)
CREAT SERPL-MCNC: 1.69 MG/DL (ref 0.6–1.3)
CREAT SERPL-MCNC: 1.92 MG/DL (ref 0.6–1.3)
EOSINOPHIL # BLD MANUAL: 0 THOUSAND/UL (ref 0–0.4)
EOSINOPHIL NFR BLD MANUAL: 0 % (ref 0–6)
ERYTHROCYTE [DISTWIDTH] IN BLOOD BY AUTOMATED COUNT: 15.2 % (ref 11.6–15.1)
ERYTHROCYTE [DISTWIDTH] IN BLOOD BY AUTOMATED COUNT: 15.2 % (ref 11.6–15.1)
ETHANOL SERPL-MCNC: 373 MG/DL
GFR SERPL CREATININE-BSD FRML MDRD: 36 ML/MIN/1.73SQ M
GFR SERPL CREATININE-BSD FRML MDRD: 42 ML/MIN/1.73SQ M
GFR SERPL CREATININE-BSD FRML MDRD: 43 ML/MIN/1.73SQ M
GLUCOSE SERPL-MCNC: 107 MG/DL (ref 65–140)
GLUCOSE SERPL-MCNC: 112 MG/DL (ref 65–140)
GLUCOSE SERPL-MCNC: 113 MG/DL (ref 65–140)
GLUCOSE SERPL-MCNC: 84 MG/DL (ref 65–140)
GLUCOSE SERPL-MCNC: 97 MG/DL (ref 65–140)
GLUCOSE UR STRIP-MCNC: NEGATIVE MG/DL
HCT VFR BLD AUTO: 31.9 % (ref 36.5–49.3)
HCT VFR BLD AUTO: 38.5 % (ref 36.5–49.3)
HGB BLD-MCNC: 10.2 G/DL (ref 12–17)
HGB BLD-MCNC: 10.9 G/DL (ref 12–17)
HGB BLD-MCNC: 13.1 G/DL (ref 12–17)
HGB UR QL STRIP.AUTO: ABNORMAL
HYALINE CASTS #/AREA URNS LPF: ABNORMAL /LPF
KETONES UR STRIP-MCNC: NEGATIVE MG/DL
LACTATE SERPL-SCNC: 1.5 MMOL/L (ref 0.5–2)
LACTATE SERPL-SCNC: 2.4 MMOL/L (ref 0.5–2)
LEUKOCYTE ESTERASE UR QL STRIP: NEGATIVE
LYMPHOCYTES # BLD AUTO: 1.36 THOUSAND/UL (ref 0.6–4.47)
LYMPHOCYTES # BLD AUTO: 17 % (ref 14–44)
MACROCYTES BLD QL AUTO: PRESENT
MAGNESIUM SERPL-MCNC: 1.2 MG/DL (ref 1.9–2.7)
MAGNESIUM SERPL-MCNC: 1.8 MG/DL (ref 1.9–2.7)
MAGNESIUM SERPL-MCNC: 1.9 MG/DL (ref 1.9–2.7)
MCH RBC QN AUTO: 33 PG (ref 26.8–34.3)
MCH RBC QN AUTO: 33.1 PG (ref 26.8–34.3)
MCHC RBC AUTO-ENTMCNC: 34 G/DL (ref 31.4–37.4)
MCHC RBC AUTO-ENTMCNC: 34.2 G/DL (ref 31.4–37.4)
MCV RBC AUTO: 97 FL (ref 82–98)
MCV RBC AUTO: 97 FL (ref 82–98)
METHADONE UR QL: NEGATIVE
MONOCYTES # BLD AUTO: 0.52 THOUSAND/UL (ref 0–1.22)
MONOCYTES NFR BLD: 8 % (ref 4–12)
NEUTROPHILS # BLD MANUAL: 4.53 THOUSAND/UL (ref 1.85–7.62)
NEUTS BAND NFR BLD MANUAL: 2 % (ref 0–8)
NEUTS SEG NFR BLD AUTO: 68 % (ref 43–75)
NITRITE UR QL STRIP: POSITIVE
NON-SQ EPI CELLS URNS QL MICRO: ABNORMAL /HPF
OPIATES UR QL SCN: NEGATIVE
OXYCODONE+OXYMORPHONE UR QL SCN: NEGATIVE
P AXIS: 56 DEGREES
P AXIS: 68 DEGREES
PCP UR QL: NEGATIVE
PH UR STRIP.AUTO: 6 [PH]
PHOSPHATE SERPL-MCNC: 2 MG/DL (ref 2.3–4.1)
PLATELET # BLD AUTO: 46 THOUSANDS/UL (ref 149–390)
PLATELET # BLD AUTO: 63 THOUSANDS/UL (ref 149–390)
PLATELET BLD QL SMEAR: ABNORMAL
PMV BLD AUTO: 11.1 FL (ref 8.9–12.7)
PMV BLD AUTO: 12.3 FL (ref 8.9–12.7)
POTASSIUM SERPL-SCNC: 3.5 MMOL/L (ref 3.5–5.3)
POTASSIUM SERPL-SCNC: 3.6 MMOL/L (ref 3.5–5.3)
POTASSIUM SERPL-SCNC: 4 MMOL/L (ref 3.5–5.3)
PR INTERVAL: 126 MS
PR INTERVAL: 130 MS
PROCALCITONIN SERPL-MCNC: 0.35 NG/ML
PROT SERPL-MCNC: 6 G/DL (ref 6.4–8.4)
PROT SERPL-MCNC: 6.3 G/DL (ref 6.4–8.4)
PROT SERPL-MCNC: 7 G/DL (ref 6.4–8.4)
PROT UR STRIP-MCNC: ABNORMAL MG/DL
QRS AXIS: 0 DEGREES
QRS AXIS: 78 DEGREES
QRS AXIS: 80 DEGREES
QRSD INTERVAL: 0 MS
QRSD INTERVAL: 84 MS
QRSD INTERVAL: 90 MS
QT INTERVAL: 0 MS
QT INTERVAL: 348 MS
QT INTERVAL: 400 MS
QTC INTERVAL: 0 MS
QTC INTERVAL: 453 MS
QTC INTERVAL: 481 MS
RBC # BLD AUTO: 3.29 MILLION/UL (ref 3.88–5.62)
RBC # BLD AUTO: 3.97 MILLION/UL (ref 3.88–5.62)
RBC #/AREA URNS AUTO: ABNORMAL /HPF
RBC MORPH BLD: PRESENT
SODIUM SERPL-SCNC: 130 MMOL/L (ref 135–147)
SODIUM SERPL-SCNC: 133 MMOL/L (ref 135–147)
SODIUM SERPL-SCNC: 135 MMOL/L (ref 135–147)
SP GR UR STRIP.AUTO: <=1.005 (ref 1–1.03)
T WAVE AXIS: 0 DEGREES
T WAVE AXIS: 78 DEGREES
T WAVE AXIS: 89 DEGREES
THC UR QL: NEGATIVE
UROBILINOGEN UR QL STRIP.AUTO: 0.2 E.U./DL
VARIANT LYMPHS # BLD AUTO: 4 %
VENTRICULAR RATE: 0 BPM
VENTRICULAR RATE: 102 BPM
VENTRICULAR RATE: 87 BPM
WBC # BLD AUTO: 4.96 THOUSAND/UL (ref 4.31–10.16)
WBC # BLD AUTO: 6.47 THOUSAND/UL (ref 4.31–10.16)
WBC #/AREA URNS AUTO: ABNORMAL /HPF

## 2024-03-25 PROCEDURE — 93005 ELECTROCARDIOGRAM TRACING: CPT

## 2024-03-25 PROCEDURE — 83735 ASSAY OF MAGNESIUM: CPT | Performed by: EMERGENCY MEDICINE

## 2024-03-25 PROCEDURE — 83605 ASSAY OF LACTIC ACID: CPT | Performed by: EMERGENCY MEDICINE

## 2024-03-25 PROCEDURE — 96365 THER/PROPH/DIAG IV INF INIT: CPT

## 2024-03-25 PROCEDURE — 96375 TX/PRO/DX INJ NEW DRUG ADDON: CPT

## 2024-03-25 PROCEDURE — 99285 EMERGENCY DEPT VISIT HI MDM: CPT

## 2024-03-25 PROCEDURE — 85027 COMPLETE CBC AUTOMATED: CPT | Performed by: EMERGENCY MEDICINE

## 2024-03-25 PROCEDURE — NC001 PR NO CHARGE: Performed by: EMERGENCY MEDICINE

## 2024-03-25 PROCEDURE — 84145 PROCALCITONIN (PCT): CPT | Performed by: EMERGENCY MEDICINE

## 2024-03-25 PROCEDURE — 83735 ASSAY OF MAGNESIUM: CPT

## 2024-03-25 PROCEDURE — C9113 INJ PANTOPRAZOLE SODIUM, VIA: HCPCS

## 2024-03-25 PROCEDURE — 80053 COMPREHEN METABOLIC PANEL: CPT | Performed by: EMERGENCY MEDICINE

## 2024-03-25 PROCEDURE — 70450 CT HEAD/BRAIN W/O DYE: CPT

## 2024-03-25 PROCEDURE — 82948 REAGENT STRIP/BLOOD GLUCOSE: CPT

## 2024-03-25 PROCEDURE — 96361 HYDRATE IV INFUSION ADD-ON: CPT

## 2024-03-25 PROCEDURE — 84484 ASSAY OF TROPONIN QUANT: CPT | Performed by: EMERGENCY MEDICINE

## 2024-03-25 PROCEDURE — 85007 BL SMEAR W/DIFF WBC COUNT: CPT | Performed by: EMERGENCY MEDICINE

## 2024-03-25 PROCEDURE — 82077 ASSAY SPEC XCP UR&BREATH IA: CPT | Performed by: EMERGENCY MEDICINE

## 2024-03-25 PROCEDURE — 81001 URINALYSIS AUTO W/SCOPE: CPT | Performed by: EMERGENCY MEDICINE

## 2024-03-25 PROCEDURE — HZ2ZZZZ DETOXIFICATION SERVICES FOR SUBSTANCE ABUSE TREATMENT: ICD-10-PCS | Performed by: EMERGENCY MEDICINE

## 2024-03-25 PROCEDURE — 80053 COMPREHEN METABOLIC PANEL: CPT

## 2024-03-25 PROCEDURE — 99285 EMERGENCY DEPT VISIT HI MDM: CPT | Performed by: EMERGENCY MEDICINE

## 2024-03-25 PROCEDURE — 85018 HEMOGLOBIN: CPT | Performed by: NURSE PRACTITIONER

## 2024-03-25 PROCEDURE — 93010 ELECTROCARDIOGRAM REPORT: CPT | Performed by: INTERNAL MEDICINE

## 2024-03-25 PROCEDURE — 71260 CT THORAX DX C+: CPT

## 2024-03-25 PROCEDURE — 84100 ASSAY OF PHOSPHORUS: CPT | Performed by: EMERGENCY MEDICINE

## 2024-03-25 PROCEDURE — 99223 1ST HOSP IP/OBS HIGH 75: CPT | Performed by: INTERNAL MEDICINE

## 2024-03-25 PROCEDURE — 74177 CT ABD & PELVIS W/CONTRAST: CPT

## 2024-03-25 PROCEDURE — 80307 DRUG TEST PRSMV CHEM ANLYZR: CPT | Performed by: EMERGENCY MEDICINE

## 2024-03-25 PROCEDURE — 93010 ELECTROCARDIOGRAM REPORT: CPT

## 2024-03-25 PROCEDURE — 83605 ASSAY OF LACTIC ACID: CPT | Performed by: NURSE PRACTITIONER

## 2024-03-25 PROCEDURE — 87040 BLOOD CULTURE FOR BACTERIA: CPT | Performed by: EMERGENCY MEDICINE

## 2024-03-25 PROCEDURE — 36415 COLL VENOUS BLD VENIPUNCTURE: CPT | Performed by: EMERGENCY MEDICINE

## 2024-03-25 PROCEDURE — 99291 CRITICAL CARE FIRST HOUR: CPT | Performed by: EMERGENCY MEDICINE

## 2024-03-25 RX ORDER — RANOLAZINE 500 MG/1
500 TABLET, EXTENDED RELEASE ORAL EVERY 12 HOURS
Status: DISCONTINUED | OUTPATIENT
Start: 2024-03-25 | End: 2024-03-26 | Stop reason: HOSPADM

## 2024-03-25 RX ORDER — ONDANSETRON 2 MG/ML
4 INJECTION INTRAMUSCULAR; INTRAVENOUS EVERY 6 HOURS PRN
Status: DISCONTINUED | OUTPATIENT
Start: 2024-03-25 | End: 2024-03-26 | Stop reason: HOSPADM

## 2024-03-25 RX ORDER — ALBUTEROL SULFATE 90 UG/1
2 AEROSOL, METERED RESPIRATORY (INHALATION) EVERY 4 HOURS PRN
Status: DISCONTINUED | OUTPATIENT
Start: 2024-03-25 | End: 2024-03-25

## 2024-03-25 RX ORDER — LANOLIN ALCOHOL/MO/W.PET/CERES
100 CREAM (GRAM) TOPICAL DAILY
Status: DISCONTINUED | OUTPATIENT
Start: 2024-03-25 | End: 2024-03-26 | Stop reason: HOSPADM

## 2024-03-25 RX ORDER — TAMSULOSIN HYDROCHLORIDE 0.4 MG/1
0.4 CAPSULE ORAL DAILY
Status: DISCONTINUED | OUTPATIENT
Start: 2024-03-25 | End: 2024-03-26 | Stop reason: HOSPADM

## 2024-03-25 RX ORDER — ALBUTEROL SULFATE 90 UG/1
2 AEROSOL, METERED RESPIRATORY (INHALATION) EVERY 4 HOURS PRN
Status: DISCONTINUED | OUTPATIENT
Start: 2024-03-25 | End: 2024-03-25 | Stop reason: HOSPADM

## 2024-03-25 RX ORDER — AMOXICILLIN 250 MG
1 CAPSULE ORAL DAILY PRN
Status: DISCONTINUED | OUTPATIENT
Start: 2024-03-25 | End: 2024-03-26 | Stop reason: HOSPADM

## 2024-03-25 RX ORDER — DEXTROSE AND SODIUM CHLORIDE 5; .9 G/100ML; G/100ML
100 INJECTION, SOLUTION INTRAVENOUS CONTINUOUS
Status: DISCONTINUED | OUTPATIENT
Start: 2024-03-25 | End: 2024-03-25

## 2024-03-25 RX ORDER — ACETAMINOPHEN 325 MG/1
650 TABLET ORAL EVERY 6 HOURS PRN
Status: DISCONTINUED | OUTPATIENT
Start: 2024-03-25 | End: 2024-03-26

## 2024-03-25 RX ORDER — MAGNESIUM HYDROXIDE/ALUMINUM HYDROXICE/SIMETHICONE 120; 1200; 1200 MG/30ML; MG/30ML; MG/30ML
30 SUSPENSION ORAL EVERY 4 HOURS PRN
Status: DISCONTINUED | OUTPATIENT
Start: 2024-03-25 | End: 2024-03-26 | Stop reason: HOSPADM

## 2024-03-25 RX ORDER — MAGNESIUM SULFATE HEPTAHYDRATE 40 MG/ML
2 INJECTION, SOLUTION INTRAVENOUS ONCE
Status: COMPLETED | OUTPATIENT
Start: 2024-03-25 | End: 2024-03-25

## 2024-03-25 RX ORDER — SODIUM CHLORIDE 9 MG/ML
100 INJECTION, SOLUTION INTRAVENOUS CONTINUOUS
Status: DISCONTINUED | OUTPATIENT
Start: 2024-03-25 | End: 2024-03-25

## 2024-03-25 RX ORDER — ONDANSETRON 2 MG/ML
4 INJECTION INTRAMUSCULAR; INTRAVENOUS EVERY 6 HOURS PRN
Status: DISCONTINUED | OUTPATIENT
Start: 2024-03-25 | End: 2024-03-25 | Stop reason: HOSPADM

## 2024-03-25 RX ORDER — FOLIC ACID 1 MG/1
1000 TABLET ORAL DAILY
Status: DISCONTINUED | OUTPATIENT
Start: 2024-03-25 | End: 2024-03-26 | Stop reason: HOSPADM

## 2024-03-25 RX ORDER — ACETAMINOPHEN 325 MG/1
650 TABLET ORAL EVERY 6 HOURS PRN
Status: DISCONTINUED | OUTPATIENT
Start: 2024-03-25 | End: 2024-03-25 | Stop reason: HOSPADM

## 2024-03-25 RX ORDER — CEFTRIAXONE 1 G/50ML
1000 INJECTION, SOLUTION INTRAVENOUS EVERY 24 HOURS
Status: CANCELLED | OUTPATIENT
Start: 2024-03-26

## 2024-03-25 RX ORDER — PANTOPRAZOLE SODIUM 40 MG/10ML
40 INJECTION, POWDER, LYOPHILIZED, FOR SOLUTION INTRAVENOUS EVERY 12 HOURS SCHEDULED
Status: DISCONTINUED | OUTPATIENT
Start: 2024-03-25 | End: 2024-03-25

## 2024-03-25 RX ORDER — CEFTRIAXONE 1 G/50ML
1000 INJECTION, SOLUTION INTRAVENOUS EVERY 24 HOURS
Status: DISCONTINUED | OUTPATIENT
Start: 2024-03-26 | End: 2024-03-25

## 2024-03-25 RX ORDER — ONDANSETRON 2 MG/ML
4 INJECTION INTRAMUSCULAR; INTRAVENOUS EVERY 6 HOURS PRN
Status: CANCELLED | OUTPATIENT
Start: 2024-03-25

## 2024-03-25 RX ORDER — ALBUTEROL SULFATE 90 UG/1
2 AEROSOL, METERED RESPIRATORY (INHALATION) EVERY 4 HOURS PRN
Status: CANCELLED | OUTPATIENT
Start: 2024-03-25

## 2024-03-25 RX ORDER — FERROUS SULFATE 325(65) MG
325 TABLET ORAL
Status: DISCONTINUED | OUTPATIENT
Start: 2024-03-26 | End: 2024-03-25

## 2024-03-25 RX ORDER — FLUTICASONE FUROATE AND VILANTEROL 200; 25 UG/1; UG/1
1 POWDER RESPIRATORY (INHALATION) DAILY
Status: DISCONTINUED | OUTPATIENT
Start: 2024-03-25 | End: 2024-03-26 | Stop reason: HOSPADM

## 2024-03-25 RX ORDER — SODIUM CHLORIDE, SODIUM LACTATE, POTASSIUM CHLORIDE, CALCIUM CHLORIDE 600; 310; 30; 20 MG/100ML; MG/100ML; MG/100ML; MG/100ML
125 INJECTION, SOLUTION INTRAVENOUS CONTINUOUS
Status: DISCONTINUED | OUTPATIENT
Start: 2024-03-25 | End: 2024-03-26

## 2024-03-25 RX ORDER — CEFTRIAXONE 1 G/50ML
1000 INJECTION, SOLUTION INTRAVENOUS EVERY 24 HOURS
Status: DISCONTINUED | OUTPATIENT
Start: 2024-03-25 | End: 2024-03-25 | Stop reason: HOSPADM

## 2024-03-25 RX ORDER — METOPROLOL TARTRATE 50 MG/1
50 TABLET, FILM COATED ORAL EVERY 12 HOURS SCHEDULED
Status: DISCONTINUED | OUTPATIENT
Start: 2024-03-25 | End: 2024-03-25

## 2024-03-25 RX ORDER — ALBUMIN, HUMAN INJ 5% 5 %
12.5 SOLUTION INTRAVENOUS ONCE
Status: COMPLETED | OUTPATIENT
Start: 2024-03-25 | End: 2024-03-25

## 2024-03-25 RX ORDER — DEXTROSE AND SODIUM CHLORIDE 5; .9 G/100ML; G/100ML
100 INJECTION, SOLUTION INTRAVENOUS CONTINUOUS
Status: CANCELLED | OUTPATIENT
Start: 2024-03-25

## 2024-03-25 RX ORDER — DEXTROSE AND SODIUM CHLORIDE 5; .9 G/100ML; G/100ML
100 INJECTION, SOLUTION INTRAVENOUS CONTINUOUS
Status: DISCONTINUED | OUTPATIENT
Start: 2024-03-25 | End: 2024-03-25 | Stop reason: HOSPADM

## 2024-03-25 RX ORDER — ALBUTEROL SULFATE 90 UG/1
2 AEROSOL, METERED RESPIRATORY (INHALATION) EVERY 4 HOURS PRN
Status: DISCONTINUED | OUTPATIENT
Start: 2024-03-25 | End: 2024-03-26 | Stop reason: HOSPADM

## 2024-03-25 RX ORDER — PRAVASTATIN SODIUM 40 MG
40 TABLET ORAL
Status: DISCONTINUED | OUTPATIENT
Start: 2024-03-25 | End: 2024-03-26 | Stop reason: HOSPADM

## 2024-03-25 RX ORDER — ACETAMINOPHEN 325 MG/1
650 TABLET ORAL EVERY 6 HOURS PRN
Status: CANCELLED | OUTPATIENT
Start: 2024-03-25

## 2024-03-25 RX ADMIN — MAGNESIUM SULFATE HEPTAHYDRATE 2 G: 40 INJECTION, SOLUTION INTRAVENOUS at 08:00

## 2024-03-25 RX ADMIN — ALBUMIN (HUMAN) 12.5 G: 12.5 INJECTION, SOLUTION INTRAVENOUS at 05:42

## 2024-03-25 RX ADMIN — ALBUTEROL SULFATE 2 PUFF: 90 AEROSOL, METERED RESPIRATORY (INHALATION) at 17:19

## 2024-03-25 RX ADMIN — TAMSULOSIN HYDROCHLORIDE 0.4 MG: 0.4 CAPSULE ORAL at 17:21

## 2024-03-25 RX ADMIN — FLUTICASONE FUROATE AND VILANTEROL TRIFENATATE 1 PUFF: 200; 25 POWDER RESPIRATORY (INHALATION) at 17:19

## 2024-03-25 RX ADMIN — MAGNESIUM SULFATE HEPTAHYDRATE 2 G: 2 INJECTION, SOLUTION INTRAVENOUS at 10:15

## 2024-03-25 RX ADMIN — FOLIC ACID 1 MG: 5 INJECTION, SOLUTION INTRAMUSCULAR; INTRAVENOUS; SUBCUTANEOUS at 11:45

## 2024-03-25 RX ADMIN — THIAMINE HYDROCHLORIDE 500 MG: 100 INJECTION, SOLUTION INTRAMUSCULAR; INTRAVENOUS at 11:47

## 2024-03-25 RX ADMIN — SODIUM CHLORIDE 1000 ML: 0.9 INJECTION, SOLUTION INTRAVENOUS at 03:46

## 2024-03-25 RX ADMIN — APIXABAN 5 MG: 5 TABLET, FILM COATED ORAL at 17:18

## 2024-03-25 RX ADMIN — Medication 650 MG: at 10:07

## 2024-03-25 RX ADMIN — IOHEXOL 100 ML: 350 INJECTION, SOLUTION INTRAVENOUS at 03:44

## 2024-03-25 RX ADMIN — ASPIRIN 81 MG: 81 TABLET, COATED ORAL at 17:19

## 2024-03-25 RX ADMIN — SODIUM CHLORIDE, SODIUM LACTATE, POTASSIUM CHLORIDE, AND CALCIUM CHLORIDE 125 ML/HR: .6; .31; .03; .02 INJECTION, SOLUTION INTRAVENOUS at 14:39

## 2024-03-25 RX ADMIN — DEXTROSE AND SODIUM CHLORIDE 100 ML/HR: 5; .9 INJECTION, SOLUTION INTRAVENOUS at 10:04

## 2024-03-25 RX ADMIN — PRAVASTATIN SODIUM 40 MG: 40 TABLET ORAL at 17:18

## 2024-03-25 RX ADMIN — RANOLAZINE 500 MG: 500 TABLET, EXTENDED RELEASE ORAL at 17:18

## 2024-03-25 RX ADMIN — PANTOPRAZOLE SODIUM 40 MG: 40 INJECTION, POWDER, FOR SOLUTION INTRAVENOUS at 11:48

## 2024-03-25 RX ADMIN — SODIUM CHLORIDE 1000 ML: 0.9 INJECTION, SOLUTION INTRAVENOUS at 03:00

## 2024-03-25 NOTE — H&P
HISTORY & PHYSICAL EXAM  DEPARTMENT OF MEDICAL TOXICOLOGY  LEVEL 4 MEDICAL DETOX UNIT  Gregg Partida 63 y.o. male MRN: [id number]  Unit/Bed#:  DETOX 511-01 Encounter: [id number]      Reason for Admission/Principal Problem: Ethanol withdrawal, Ethanol use disorder  Admitting Provider: Jens Pearce DO  Attending Provider: Jens Pearce DO   3/25/2024  9:10 AM        Hemorrhagic cystitis  Assessment & Plan  Continue ceftriaxone and follow-up urine and blood cultures.  Likely associated with enlarged prostate and history prostate cancer.  Will need follow-up with urology to further evaluate the hematuria.    Hypertension  Assessment & Plan  -initially lisinopril held given elevated creatinine but can be restarted on discharge. Continue lopressor.     Gastrointestinal hemorrhage with melena  Assessment & Plan  Patient with recent described history of melena, scant dried blood on rectum with positive bedside Hemoccult performed by me, hypotension, anticoagulation on apixaban.  Hemoglobin stable with severe thrombocytopenia secondary to alcohol use disorder.  Prior endoscopy shows gastritis and diverticulosis.  No known history of varices but he is at risk with his alcohol use disorder.  Starting IV Protonix bolus and infusion.  Ceftriaxone already started for his UTI.  Transfer to other [facility] facility for access to emergent endoscopy and critical care if needed.     GERD (gastroesophageal reflux disease)  Assessment & Plan  IV PPI out of concern for GIB    Paroxysmal atrial fibrillation (HCC)  Assessment & Plan  Currently in AF, tachycardia improved with treatment of ETOH WD  Holding apixaban out of concern for GIB     Chronic heart failure with preserved ejection fraction (HCC)  Assessment & Plan  Wt Readings from Last 3 Encounters:   03/25/24 86.2 kg (190 lb)   03/25/24 87.7 kg (193 lb 5.5 oz)   03/09/24 89.2 kg (196 lb 10.4 oz)               Hyponatremia  Assessment & Plan  Mild, 130 to 133.  Continue to monitor.     Alcohol withdrawal syndrome with complication ([facility])  Assessment & Plan  H/o chronic daily alcohol consumption, denies h/o withdrawal seizures  Last drink: 3/25/24  Ethanol lvl: 373  Follow SEWS protocol with symptom-triggered phenobarbital for medically-assisted alcohol withdrawal  Current symptoms include anxiety, agitation/impulsiveness, tremors, diaphoresis   SEWS score upon admission 13   Administer 650 mg phenobarbital   Telemetry monitoring   Continue to monitor vitals, symptoms    Anticipate continued and worsening alcohol withdrawal as patient metabolizes his ethanol.  Toxicology available for further assistance upon request after transfer.        Acute kidney injury superimposed on CKD   Assessment & Plan  Lab Results   Component Value Date    EGFR 36 03/25/2024    EGFR 71 03/09/2024    EGFR 54 03/08/2024    CREATININE 1.92 (H) 03/25/2024    CREATININE 1.11 03/09/2024    CREATININE 1.38 (H) 03/08/2024     Evidence of ENMA on admission with elevated Cr 1.92  Baseline Cr 0.88-1.0   IVF hydration  Avoid nephrotoxic agents  Continue monitoring renal function     CAD (coronary artery disease)  Assessment & Plan  Hold metoprolol and lisinopril in light of hypotension     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 disease ([facility])  Assessment & Plan  Lab Results   Component Value Date    HGBA1C 5.4 01/24/2024       Recent Labs     03/25/24  0250   POCGLU 113       Blood Sugar Average: Last 72 hrs:    Sliding scale in hospital. Consider restarting metformin on DC.     COPD (chronic obstructive pulmonary disease) ([facility])  Assessment & Plan  PRN albuterol               VTE Prophylaxis:  Holding in light of GI bleed and thrombocytopenia   / sequential compression device   Code Status: full      Anticipated Length of Stay:  Patient will be admitted on an Inpatient basis with an anticipated length of stay of  > 2 midnights.   Justification for Hospital Stay: GIB, ETOH WD  midnights    For  any questions or concerns, please Tiger Text the advanced practitioner in the role of [facility] [place]-AP On Call      This patient qualifies for Level IV medically managed intensive inpatient services under the criteria set by the American Society of Addiction Medicine, including dimensions 1-3. The patient is in withdrawal (or is intoxicated with high risk of withdrawal), with severe and unstable medical and/or psychiatric (dual diagnosis) problems, requiring requires 24-hour medical and nursing care and the full resources of a licensed hospital.          SEWS PHENOBARBITAL PROTOCOL FOR ALCOHOL WITHDRAWAL    Admit patient to medical detox unit and continue supportive care and stabilization of acute ethanol withdrawal per medical toxicology/detox treatment pathway. Monitor ethanol withdrawal severity via the Severity of Ethanol Withdrawal Scale (SEWS) Q4 hours and then hourly if/when SEWS > 6. Treat withdrawal per pathway and reassess Q30-60 minutes.           Mild SEWS Score 1-6  Administer medications* (IV or PO; PO preferred):  If initial SEWS score: diazepam 10mg PO/IV x 1 AND phenobarbital 65 mg PO/IV x 1  If repeat SEWS score 1-6: phenobarbital 65 mg PO/IV q1 hour x 5 doses maximum   Reassessment:   SEWS q1 hour after each dose until SEWS 0 x 2 hours  VS q1 hours (until SEWS 0, then q4 hours)  Notify provider for bedside evaluation if 5-dose maximum is reached, RASS of -3 to -5, or SEWS score escalates to moderate or severe.   Moderate SEWS Score 7-12  Administer medications* (IV):  If initial SEWS score: diazepam 10mg IV x 1 AND phenobarbital 260 mg IV x 1  If repeat SEWS score 7-12 or score escalated from mild: phenobarbital 130 mg IV q30 minutes x 5 doses maximum   Reassessment:  SEWS q30 minutes after each dose until SEWS < 7 (then hourly until SEWS 0 x 2 hours)  VS q30 minutes until SEWS < 7 (then hourly until SEWS 0, then q4 hours)  Notify provider for bedside evaluation if 5-dose maximum is reached, RASS of  -3 to -5, or SEWS score escalates to severe.   Severe SEWS Score ? 13  Administer medications* (IV):  If initial SEWS score: Diazepam 10 mg IV x 1 AND phenobarbital 650 mg IV piggyback x 1 over 15-30 minutes  If repeat SEWS score ? 13 or score escalated from mild or moderate: phenobarbital 130 mg IV q30 minutes x 5 doses maximum   Reassessment:  SEWS q30 minutes after each dose until SEWS < 7 (then hourly until SEWS 0 x 2 hours)   VS q30 minutes until SEWS < 7 (then hourly until SEWS 0, then q4 hours)  Notify provider for bedside evaluation if 5-dose maximum is reached or RASS of -3 to -5   *Hold medications and notify provider if CNS depression, respirations < 10/min, or RASS of -3 to -5.         Medications to be administered adjunctively if more than 2 grams of phenobarbital is needed for stabilization of withdrawal; require attending approval.   Dexmedetomidine infusion 0.1-1mcg/kg/hr IV infusion, titratable to reduced agitation (Goal: RASS -2)  Ketamine   Acute agitated delirium: 1-2 mg/kg IV or 4-5 mg/kg IM  Refractory withdrawal: 0.1-1mg/kg/hr IV infusion, titratable to reduced agitation (Goal: RASS -2)    Further evaluation, screening and treatment:  Evaluate complete metabolic panel, transaminases, INR, and lipase. Assess hepatic ultrasound for any sign of alcoholic liver disease or cirrhosis, and ultimately refer for further hepatic evaluation and care as/if indicated.    Additional medications for ethanol associated malnutrition:  Thiamine 100 mg IV daily, increase to 500 mg TID for signs/symptoms of Wernicke's Encephalopathy or Wernicke Korsakoff Syndrome   Folic acid 1 mg IV daily   Multivitamin PO daily      Will offer first monthly injection of Naltrexone 380 mg IM, once patient is stabilized, as it has been shown to assist in decreasing cravings for ethanol.     Evaluate and treat for coexisting substance use, such as opioids and nicotine. Discuss risk factors for infectious disease, such as history  of intravenous drug abuse, and offer hepatitis and HIV screening if indicated.     Case management consultation to assist with coordination of subsequent treatment after discharge.          Hx and PE limited by: Continued intoxication    HPI: Gregg Partida is a 63 y.o. year old male who presents with a fall and request to have assistance and discontinuation of his alcohol use.  He has had recent falls and recent trauma evaluations that are uncovered several subacute fractured ribs.  He is on apixaban for atrial fibrillation and also reports that he has had melena in the past several days.  He was admitted to the detox unit where he exhibited hypotension and had positive Hemoccult test at the bedside.  His alcohol withdrawal had not yet fully taken place but he was treated with 1 dose of phenobarbital for early symptoms.  Apixaban was held.  Protonix and ceftriaxone were started.  Patient was hydrated with blood pressure now in 80s and hemoglobin repeated and decreased by 2.2.  He was transferred to [place] for stepdown 1 for access to critical care and GI[facility]: 13 (3/25/2024  9:31 AM)      Social History     Substance and Sexual Activity   Alcohol Use Yes    Alcohol/week: 4.0 standard drinks of alcohol    Types: 4 Standard drinks or equivalent per week    Comment: quart of vodka daily     Social History     Substance and Sexual Activity   Drug Use No     Social History     Tobacco Use   Smoking Status Every Day    Current packs/day: 1.50    Average packs/day: 1.5 packs/day for 40.0 years (60.0 ttl pk-yrs)    Types: Cigarettes   Smokeless Tobacco Never       Review of Systems   Constitutional:  Negative for fever.   HENT: Negative.     Respiratory: Negative.  Negative for shortness of breath.    Cardiovascular:  Negative for chest pain.   Gastrointestinal: Negative.  Negative for abdominal pain.   Genitourinary: Negative.    Musculoskeletal: Negative.    Neurological:  Negative for  tremors.        Falls   Hematological: Negative.    Psychiatric/Behavioral:          Limited by intoxication       Historical Information   Past Medical History:   Diagnosis Date    Acute on chronic kidney failure      Alcohol withdrawal (HCC) 06/07/2019    Atrial fibrillation (HCC)     Cancer (HCC)     prostate ca,had radiation    Cardiac disease     stents,then triple bypass    COPD (chronic obstructive pulmonary disease) (HCC)     Coronary artery disease     ETOH abuse     Heart failure (HCC)     History of heart surgery     says triple bypass [facility]    Hx of heart artery stent     2014    Hyperlipidemia     Hypertension     Hypovolemic shock ([facility]) 12/22/2019    Lumbar spondylitis ([facility]) 10/13/2022    Nasal bone fracture 10/10/2022    Prostate CA (HCC)     S/P CABG x 3     2004    Sleep apnea      Past Surgical History:   Procedure Laterality Date    CARDIAC CATHETERIZATION      2 stents    CORONARY ARTERY BYPASS GRAFT  2004    MO ARTHRD ANT INTERBODY MIN DSC CRV BELOW C2 N/A 12/16/2020    Procedure: Anterior cervical discectomy with fusion C4-C7; Posterior cervical decompression and fusion C2-T2;  Surgeon: David Rowell MD;  Location: [facility];  Service: Neurosurgery    TONSILLECTOMY       Family History   Problem Relation Age of Onset    Diabetes Mother     Uterine cancer Mother     COPD Father     Hypertension Father      Social History   Marital Status: Single       No Known Allergies    Prior to Admission medications    Medication Sig Start Date End Date Taking? Authorizing Provider   apixaban (ELIQUIS) 5 mg Take 1 tablet (5 mg total) by mouth 2 (two) times a day Do not start before January 31, 2024. 1/31/24  Yes Amilcar Garcia MD   ferrous sulfate 325 (65 Fe) mg tablet Take 1 tablet (325 mg total) by mouth daily with breakfast 6/2/23  Yes Johnson Pak MD   folic acid (FOLVITE) 1 mg tablet TAKE 1 TABLET DAILY 2/26/24  Yes Rich Finn MD   gabapentin (NEURONTIN) 300 mg capsule  TAKE ONE CAPSULE THREE TIMES DAILY 2/16/24  Yes Korin Lo MD   lisinopril (ZESTRIL) 20 mg tablet Take 0.5 tablets (10 mg total) by mouth daily 10/5/23  Yes Jonel Calvillo MD   magnesium Oxide (MAG-OX) 400 mg TABS Take 1 tablet (400 mg total) by mouth 2 (two) times a day 1/31/24  Yes Elliott Zimmerman MD   metFORMIN (GLUCOPHAGE) 500 mg tablet Take 1 tablet (500 mg total) by mouth daily with breakfast Do not start before September 23, 2023. 9/23/23  Yes Brandy Connors DO   metoprolol tartrate (LOPRESSOR) 50 mg tablet Take 1 tablet (50 mg total) by mouth every 12 (twelve) hours 2/14/24  Yes Lois Kitchen DO   pravastatin (PRAVACHOL) 40 mg tablet TAKE 1 TABLET DAILY WITH DINNER 2/15/23  Yes Angel Brennan MD   ranolazine (RANEXA) 500 mg 12 hr tablet TAKE 1 TABLET EVERY 12 HOURS 8/17/23  Yes Korin Lo MD   tamsulosin (FLOMAX) 0.4 mg TAKE 1 CAPSULE DAILY 9/17/23  Yes Korin Lo MD   albuterol (Ventolin HFA) 90 mcg/act inhaler Inhale 2 puffs every 4 (four) hours as needed for wheezing  Patient not taking: Reported on 3/25/2024 1/17/23   Emma Erwin,    aluminum-magnesium hydroxide-simethicone (MAALOX) 4811-9880-451 mg/30 mL suspension Take 30 mL by mouth every 4 (four) hours as needed for indigestion or heartburn  Patient not taking: Reported on 3/25/2024 2/14/24   Lois Kitchen DO   aspirin (ECOTRIN LOW STRENGTH) 81 mg EC tablet Take 81 mg by mouth daily    Historical Provider, MD   Blood Glucose Monitoring Suppl (ONE TOUCH ULTRA MINI) w/Device KIT Use as directed  Patient not taking: Reported on 3/25/2024 5/16/19   Historical Provider, MD   Blood Pressure Monitoring (Adult Blood Pressure Cuff Lg) KIT Use in the morning  Patient not taking: Reported on 3/25/2024 12/14/23   Elliott Zimmerman MD   Breo Ellipta 200-25 MCG/ACT inhaler Inhale 1 puff daily Rinse mouth after use.  Patient not taking: Reported on 3/25/2024 6/9/23   Korin Lo MD   naltrexone (REVIA) 50 mg  tablet Take 1 tablet (50 mg total) by mouth daily  Patient not taking: Reported on 3/25/2024 11/3/23   Korin Lo MD   nicotine (NICODERM CQ) 21 mg/24 hr TD 24 hr patch Place 1 patch on the skin over 24 hours daily Do not start before December 4, 2023.  Patient not taking: Reported on 3/25/2024 12/4/23   Bipin Freed MD   ONETOUCH DELICA LANCETS FINE MISC 3 (three) times a day Test 5/16/19   Historical Provider, MD   pantoprazole (PROTONIX) 40 mg tablet TAKE 1 TABLET TWO TIMES A DAY 2/16/24   CHELSEA Storm   senna-docusate sodium (SENOKOT S) 8.6-50 mg per tablet Take 1 tablet by mouth daily as needed for constipation  Patient not taking: Reported on 3/25/2024 1/27/24   Amilcar Garcia MD   Thiamine HCl (vitamin B-1) 100 MG TABS TAKE 1 TABLET DAILY  Patient not taking: Reported on 3/25/2024 12/15/22   Angel Brennan MD   varenicline (CHANTIX) 1 mg tablet TAKE 1 TABLET 2 TIMES A DAY  Patient not taking: Reported on 3/25/2024 12/12/23   Korin Lo MD       Current Facility-Administered Medications   Medication Dose Route Frequency    acetaminophen (TYLENOL) tablet 650 mg  650 mg Oral Q6H PRN    albuterol (PROVENTIL HFA,VENTOLIN HFA) inhaler 2 puff  2 puff Inhalation Q4H PRN    cefTRIAXone (ROCEPHIN) IVPB (premix in dextrose) 1,000 mg 50 mL  1,000 mg Intravenous Q24H    dextrose 5 % and sodium chloride 0.9 % infusion  100 mL/hr Intravenous Continuous    folic acid 1 mg in sodium chloride 0.9 % 50 mL IVPB  1 mg Intravenous Daily    ondansetron (ZOFRAN) injection 4 mg  4 mg Intravenous Q6H PRN    pantoprazole (PROTONIX) 80 mg in sodium chloride 0.9 % 100 mL infusion  8 mg/hr Intravenous Continuous    pantoprazole (PROTONIX) 80 mg in sodium chloride 0.9 % 100 mL IVPB  80 mg Intravenous Once    thiamine (VITAMIN B1) 500 mg in sodium chloride 0.9 % 50 mL IVPB  500 mg Intravenous Q8H       Continuous Infusions:dextrose 5 % and sodium chloride 0.9 %, 100 mL/hr, Last Rate: 100 mL/hr  (03/25/24 1004)  pantoprazole (PROTONIX) 80 mg in sodium chloride 0.9 % 100 mL infusion, 8 mg/hr             Objective     No intake or output data in the 24 hours ending 03/25/24 1218    Invasive Devices:   Peripheral IV 03/25/24 Left Antecubital (Active)       Peripheral IV 03/25/24 Left;Ventral (anterior) Hand (Active)   Site Assessment WDL;Clean;Dry;Intact 03/25/24 0307   Dressing Type Transparent 03/25/24 0307   Line Status Blood return noted;Flushed & Clamped 03/25/24 0307   Dressing Status Clean;Dry;Intact 03/25/24 0307       Vitals   Vitals:    03/25/24 1105 03/25/24 1136 03/25/24 1203 03/25/24 1216   BP: 108/63 (!) 80/48 (!) 77/48 90/60   TempSrc: Temporal      Pulse: 94      Resp: 18      Patient Position - Orthostatic VS: Lying  Lying Lying   Temp: 97.6 °F (36.4 °C)          Physical Exam  Nursing note reviewed.   Constitutional:       General: He is not in acute distress.     Appearance: He is toxic-appearing.   HENT:      Head: Normocephalic and atraumatic.      Mouth/Throat:      Mouth: Mucous membranes are moist.   Eyes:      Pupils: Pupils are equal, round, and reactive to light.   Cardiovascular:      Rate and Rhythm: Tachycardia present. Rhythm irregular.   Pulmonary:      Effort: Pulmonary effort is normal.   Abdominal:      General: Abdomen is flat.      Tenderness: There is no abdominal tenderness.   Genitourinary:     Rectum: Guaiac result positive.      Comments: Scant dried blood surrounding rectum.  Musculoskeletal:         General: Normal range of motion.      Cervical back: Normal range of motion.   Skin:     General: Skin is warm and dry.   Neurological:      Mental Status: He is alert.             Data:    EKG, Pathology, and Other Studies: I have personally reviewed pertinent reports.    EKG: Atrial fibrillation with rate of 87 bpm, no acute ischemia.    Lab Results:  CBC ETOH     Lab Results   Component Value Date    WBC 4.96 03/25/2024    RBC 3.29 (L) 03/25/2024    HGB 10.9 (L)  03/25/2024    HCT 31.9 (L) 03/25/2024    MCV 97 03/25/2024    MCH 33.1 03/25/2024    MCHC 34.2 03/25/2024    RDW 15.2 (H) 03/25/2024    PLT 46 (L) 03/25/2024    MPV 11.1 03/25/2024      Lab Results   Component Value Date    LACTICACID 1.2 01/22/2024      CMP UA         Component Value Date/Time    K 3.5 03/25/2024 1127    K 5.7 (H) 10/13/2020 1501    CL 99 03/25/2024 1127     10/13/2020 1501    CO2 22 03/25/2024 1127    CO2 26 02/01/2022 1552    CO2 18 (L) 10/13/2020 1501    BUN 23 03/25/2024 1127    BUN 25 (H) 10/13/2020 1501    CREATININE 1.67 (H) 03/25/2024 1127         Component Value Date/Time    CALCIUM 7.6 (L) 03/25/2024 1127    ALKPHOS 67 03/25/2024 1127    AST 88 (H) 03/25/2024 1127    AST 26 10/13/2020 1501    ALT 47 03/25/2024 1127    ALT 17 10/13/2020 1501      Lab Results   Component Value Date    CLARITYU Slightly Cloudy 03/25/2024    COLORU Yellow 03/25/2024    SPECGRAV <=1.005 03/25/2024    PHUR 6.0 03/25/2024    PHUR 6.5 10/10/2018    GLUCOSEU Negative 03/25/2024    KETONESU Negative 03/25/2024    BLOODU Trace-Intact (A) 03/25/2024    PROTEIN  (2+) (A) 03/25/2024    NITRITE Positive (A) 03/25/2024    BILIRUBINUR Small (A) 03/25/2024    UROBILINOGEN 0.2 03/25/2024    LEUKOCYTESUR Negative 03/25/2024    WBCUA 1-2 03/25/2024    RBCUA 0-1 03/25/2024    HYALINE 0-1 (A) 03/25/2024    BACTERIA Occasional 03/25/2024    EPIS Occasional 03/25/2024        Liver Function Test: ASA     Lab Results   Component Value Date    TBILI 0.82 03/25/2024    TBILI <0.2 10/13/2020    BILIDIR 0.54 (H) 04/23/2021    ALKPHOS 67 03/25/2024    AST 88 (H) 03/25/2024    AST 26 10/13/2020    ALT 47 03/25/2024    ALT 17 10/13/2020    TP 6.0 (L) 03/25/2024    TP 7.0 10/13/2020    ALB 3.6 03/25/2024      Lab Results   Component Value Date    SALICYLATE 6.4 03/11/2022      Troponin APAP     Lab Results   Component Value Date    TROPONINI <0.02 06/25/2021      Lab Results   Component Value Date    ACTMNPHEN <2 (L) 03/11/2022  "     VBG HCG     No results found for: \"PHVEN\", \"LQD7AAC\", \"PO2VEN\", \"DOG5GJD\", \"BEVEN\", \"U8XLYRCBP\", \"U7WONZO\"   No results found for: \"HCGQUANT\"   ABG Urine Drug Screen     Lab Results   Component Value Date/Time    PHART 7.374 04/22/2021 10:10 AM    UPW1XLO 32.9 (L) 04/22/2021 10:10 AM    PO2ART 89.5 04/22/2021 10:10 AM    BET0WHG 18.8 (L) 04/22/2021 10:10 AM    BEART -5.7 04/22/2021 10:10 AM    Q7AYWEUGH 13.8 (L) 04/22/2021 10:10 AM    O2HGB 96.3 04/22/2021 10:10 AM    ALL Yes 04/21/2021 09:47 PM      Lab Results   Component Value Date    AMPMETHUR Negative 03/25/2024    BARBTUR Negative 03/25/2024    BARBTUR Negative 06/10/2022    BDZUR Negative 03/25/2024    COCAINEUR Negative 03/25/2024    METHADONEUR Negative 03/25/2024    METHADONEUR Negative 06/10/2022    OPIATEUR Negative 03/25/2024    PCPUR Negative 03/25/2024    THCUR Negative 03/25/2024    THCUR Negative 06/10/2022    OXYCODONEUR Negative 03/25/2024      Lactate INR     Lab Results   Component Value Date    LACTICACID 1.2 01/22/2024      Lab Results   Component Value Date    INR 0.94 02/10/2024      PTT Protime     Lab Results   Component Value Date/Time    PTT 30 02/10/2024 05:45 AM        Lab Results   Component Value Date/Time    PROTIME 12.8 02/10/2024 05:45 AM              Imaging Studies: I have personally reviewed pertinent reports.          Minutes of critical care time 48  -Critical care time was exclusive of separately billable procedures and teaching time.   -Critical care was necessary to treat or prevent imminent or life-threatening deterioration of the following condition: CNS failure/compromise, toxidrome (ethanol withdrawal),  toxidrome, withdrawal, metabolic crisis, and shock  -Critical care time was spent personally by me on the following activities as well as the above as per the course and rest of chart: obtaining history from patient/surrogate, development of a treatment plan, discussions with referring provider(s), evaluation of " patient's response to the treatment, examination of the patient, performing treatments and interventions, re-evaluation of the patient's condition, review of old charts, ordering/interpreting laboratory studies, ordering/interpreting of radiographic studies.      ** Please Note: This note has been constructed using a voice recognition system. **

## 2024-03-25 NOTE — ASSESSMENT & PLAN NOTE
Lab Results   Component Value Date    HGBA1C 5.4 01/24/2024       Recent Labs     03/25/24  0250   POCGLU 113       Blood Sugar Average: Last 72 hrs:    Sliding scale in hospital. Consider restarting metformin on DC.

## 2024-03-25 NOTE — ASSESSMENT & PLAN NOTE
Lab Results   Component Value Date    EGFR 43 03/25/2024    EGFR 42 03/25/2024    EGFR 36 03/25/2024    CREATININE 1.67 (H) 03/25/2024    CREATININE 1.69 (H) 03/25/2024    CREATININE 1.92 (H) 03/25/2024     Baseline Creatinine is between 1 to 1.3.   Elevated likely prerenal due to dehydration and alcohol abuse.   Patient has had elevated creatinine levels in prior hospitalizations as well.     Plan:  - Avoid nephrotoxic agents.  - monitor daily weights.  - monitor I/O  - trend Creatinine level and consider adding more IV fluids if Cr. Fails to improve

## 2024-03-25 NOTE — EMTALA/ACUTE CARE TRANSFER
[facility] EMERGENCY DEPARTMENT  [address] [address]  Dept: [phone]      EMTALA TRANSFER CONSENT    NAME Gregg Partida                                         1960                              MRN [id number]    I have been informed of my rights regarding examination, treatment, and transfer   by Dr. Seven Severino MD    Benefits: Specialized equipment and/or services available at the receiving facility (Include comment)________________________ (Medical Toxicology)    Risks:        Consent for Transfer:  I acknowledge that my medical condition has been evaluated and explained to me by the emergency department physician or other qualified medical person and/or my attending physician, who has recommended that I be transferred to the service of  Accepting Physician: Dr. Jens Pearce at Accepting Facility Name, City & State : [facility]. The above potential benefits of such transfer, the potential risks associated with such transfer, and the probable risks of not being transferred have been explained to me, and I fully understand them.  The doctor has explained that, in my case, the benefits of transfer outweigh the risks.  I agree to be transferred.    I authorize the performance of emergency medical procedures and treatments upon me in both transit and upon arrival at the receiving facility.  Additionally, I authorize the release of any and all medical records to the receiving facility and request they be transported with me, if possible.  I understand that the safest mode of transportation during a medical emergency is an ambulance and that the Hospital advocates the use of this mode of transport. Risks of traveling to the receiving facility by car, including absence of medical control, life sustaining equipment, such as oxygen, and medical personnel has been explained to me and I fully understand them.    (JACQUES CORRECT BOX BELOW)  [  ]  I consent to  the stated transfer and to be transported by ambulance/helicopter.  [  ]  I consent to the stated transfer, but refuse transportation by ambulance and accept full responsibility for my transportation by car.  I understand the risks of non-ambulance transfers and I exonerate the Hospital and its staff from any deterioration in my condition that results from this refusal.    X___________________________________________    DATE  24  TIME________  Signature of patient or legally responsible individual signing on patient behalf           RELATIONSHIP TO PATIENT_________________________          Provider Certification    NAME Gregg Partida                                         1960                              MRN [id number]    A medical screening exam was performed on the above named patient.  Based on the examination:    Condition Necessitating Transfer The primary encounter diagnosis was Alcohol intoxication (HCC). Diagnoses of Alcohol abuse and ENMA (acute kidney injury) (HCC) were also pertinent to this visit.    Patient Condition: The patient has been stabilized such that within reasonable medical probability, no material deterioration of the patient condition or the condition of the unborn child(sami) is likely to result from the transfer    Reason for Transfer: Level of Care needed not available at this facility    Transfer Requirements: Facility [facility]   Space available and qualified personnel available for treatment as acknowledged by    Agreed to accept transfer and to provide appropriate medical treatment as acknowledged by       Dr. Jens Pearce  Appropriate medical records of the examination and treatment of the patient are provided at the time of transfer   STAFF INITIAL WHEN COMPLETED _______  Transfer will be performed by qualified personnel from    and appropriate transfer equipment as required, including the use of necessary and appropriate life support  measures.    Provider Certification: I have examined the patient and explained the following risks and benefits of being transferred/refusing transfer to the patient/family:  General risk, such as traffic hazards, adverse weather conditions, rough terrain or turbulence, possible failure of equipment (including vehicle or aircraft), or consequences of actions of persons outside the control of the transport personnel      Based on these reasonable risks and benefits to the patient and/or the unborn child(sami), and based upon the information available at the time of the patient’s examination, I certify that the medical benefits reasonably to be expected from the provision of appropriate medical treatments at another medical facility outweigh the increasing risks, if any, to the individual’s medical condition, and in the case of labor to the unborn child, from effecting the transfer.    X____________________________________________ DATE 03/25/24        TIME_______      ORIGINAL - SEND TO MEDICAL RECORDS   COPY - SEND WITH PATIENT DURING TRANSFER

## 2024-03-25 NOTE — ASSESSMENT & PLAN NOTE
EKG today shows sinus tachycardia.   Rate controlled with Lopressor 50 mg BID  Eliquis 5 mg BID     Plan:   -Continue Eliquis   - restart Lopressor

## 2024-03-25 NOTE — ASSESSMENT & PLAN NOTE
Continue ceftriaxone and follow-up urine and blood cultures.  Likely associated with enlarged prostate and history prostate cancer.  Will need follow-up with urology to further evaluate the hematuria.

## 2024-03-25 NOTE — DISCHARGE SUMMARY
MEDICAL DETOX UNIT, LEVEL 4  Department of Medical Toxicology  Reason for Admission/Principal Problem: Alcohol withdrawal  Admitting provider: DO Jens Tinajero DO   3/25/2024  9:10 AM       Discharging Physician / Practitioner: Jens Pearce DO  PCP: Korin Lo MD  Admission Date:   Admission Orders (From admission, onward)       Ordered        03/25/24 0939  Inpatient Admission  Once                          Discharge Date: 03/25/24    Medical Problems       Resolved Problems  Date Reviewed: 1/26/2024   None         Hemorrhagic cystitis  Assessment & Plan  Continue ceftriaxone and follow-up urine and blood cultures.  Likely associated with enlarged prostate and history prostate cancer.  Will need follow-up with urology to further evaluate the hematuria.    Hypertension  Assessment & Plan  -initially lisinopril held given elevated creatinine but can be restarted on discharge. Continue lopressor.     Gastrointestinal hemorrhage with melena  Assessment & Plan  Patient with recent described history of melena, scant dried blood on rectum with positive bedside Hemoccult performed by me, hypotension, anticoagulation on apixaban.  Hemoglobin stable with severe thrombocytopenia secondary to alcohol use disorder.  Prior endoscopy shows gastritis and diverticulosis.  No known history of varices but he is at risk with his alcohol use disorder.  Starting IV Protonix bolus and infusion.  Ceftriaxone already started for his UTI.  Transfer to other [facility] facility for access to emergent endoscopy and critical care if needed.     GERD (gastroesophageal reflux disease)  Assessment & Plan  IV PPI out of concern for GIB    Paroxysmal atrial fibrillation (HCC)  Assessment & Plan  Currently in AF, tachycardia improved with treatment of ETOH WD  Holding apixaban out of concern for GIB     Chronic heart failure with preserved ejection fraction (HCC)  Assessment & Plan  Wt Readings from Last  3 Encounters:   03/25/24 86.2 kg (190 lb)   03/25/24 87.7 kg (193 lb 5.5 oz)   03/09/24 89.2 kg (196 lb 10.4 oz)               Hyponatremia  Assessment & Plan  Mild, 130 to 133. Continue to monitor.     Alcohol withdrawal syndrome with complication (HCC)  Assessment & Plan  H/o chronic daily alcohol consumption, denies h/o withdrawal seizures  Last drink: 3/25/24  Ethanol lvl: 373  Follow SEWS protocol with symptom-triggered phenobarbital for medically-assisted alcohol withdrawal  Current symptoms include anxiety, agitation/impulsiveness, tremors, diaphoresis   SEWS score upon admission 13   Administer 650 mg phenobarbital   Telemetry monitoring   Continue to monitor vitals, symptoms    Anticipate continued and worsening alcohol withdrawal as patient metabolizes his ethanol.  Toxicology available for further assistance upon request after transfer.        Acute kidney injury superimposed on CKD   Assessment & Plan  Lab Results   Component Value Date    EGFR 36 03/25/2024    EGFR 71 03/09/2024    EGFR 54 03/08/2024    CREATININE 1.92 (H) 03/25/2024    CREATININE 1.11 03/09/2024    CREATININE 1.38 (H) 03/08/2024     Evidence of ENMA on admission with elevated Cr 1.92  Baseline Cr 0.88-1.0   IVF hydration  Avoid nephrotoxic agents  Continue monitoring renal function     CAD (coronary artery disease)  Assessment & Plan  Hold metoprolol and lisinopril in light of hypotension     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 disease (HCC)  Assessment & Plan  Lab Results   Component Value Date    HGBA1C 5.4 01/24/2024       Recent Labs     03/25/24  0250   POCGLU 113       Blood Sugar Average: Last 72 hrs:    Sliding scale in hospital. Consider restarting metformin on DC.     COPD (chronic obstructive pulmonary disease) ([facility])  Assessment & Plan  PRN albuterol        Consultations During Hospital Stay:  None    Procedures Performed:   None    Significant Findings / Test Results:   Hyponatremia, ENMA, positive  "Hemoccult    Incidental Findings:   None    Test Results Pending at Discharge (will require follow up):   N/A     Outpatient Tests Requested:  Cystoscopy    Complications: Hypotension requiring higher level of care    Reason for Admission: Alcohol withdrawal management    Hospital Course:     Gregg Partida is a 63 y.o. male patient who originally presented to the hospital on 3/25/2024 due to alcohol withdrawal.  He also had rapid atrial fibrillation with hypotension and alcohol ketoacidosis.  He was sent to the detox unit where his hypotension persisted while heart rate improved with treatment of alcohol withdrawal.  Also treated for hemorrhagic cystitis.  Hemoglobin also decreased and his bedside Hemoccult was positive.  He was transferred to [place] for access to critical care and emergent endoscopy if needed.        Please see above list of diagnoses and related plan for additional information.     Condition at Discharge: stable     Discharge Day Visit / Exam:     Subjective: Patient feels tired  Vitals: Blood Pressure: 90/60 (03/25/24 1216)  Pulse: 94 (03/25/24 1105)  Temperature: 97.6 °F (36.4 °C) (03/25/24 1105)  Temp Source: Temporal (03/25/24 1105)  Respirations: 18 (03/25/24 1105)  Height: 6' 2\" (188 cm) (03/25/24 0930)  Weight - Scale: 86.2 kg (190 lb) (03/25/24 0930)  SpO2: 93 % (03/25/24 1105)    See exam from H&P performed just prior to transfer.    Discharge instructions/Information to patient and family:   See after visit summary for information provided to patient and family.      Provisions for Follow-Up Care:  See after visit summary for information related to follow-up care and any pertinent home health orders.      Disposition:     Other: [facility], care of Dr. Jones     Discharge Statement:  I spent 25 minutes discharging the patient. This time was spent on the day of discharge. I had direct contact with the patient on the day of discharge. Greater than 50% of the total time was " spent examining patient, answering all patient questions, arranging and discussing plan of care with patient as well as directly providing post-discharge instructions.  Additional time then spent on discharge activities.    Discharge Medications:  See after visit summary for reconciled discharge medications provided to patient and family.      ** Please Note: This note has been constructed using a voice recognition system **

## 2024-03-25 NOTE — ASSESSMENT & PLAN NOTE
Lab Results   Component Value Date    EGFR 36 03/25/2024    EGFR 71 03/09/2024    EGFR 54 03/08/2024    CREATININE 1.92 (H) 03/25/2024    CREATININE 1.11 03/09/2024    CREATININE 1.38 (H) 03/08/2024     Evidence of ENMA on admission with elevated Cr 1.92  Baseline Cr 0.88-1.0   IVF hydration  Avoid nephrotoxic agents  Continue monitoring renal function

## 2024-03-25 NOTE — ASSESSMENT & PLAN NOTE
Multiple hospitalizations for alcohol withdrawal.   Drinks quart on vodka a day .   Naltrexone in the past.     Given Phenobarbital in the ED.    Plan:   - CIWA protocol  - hydration with maintenance fluids.   - folate and thiamine supplementation

## 2024-03-25 NOTE — PLAN OF CARE
Problem: PAIN - ADULT  Goal: Verbalizes/displays adequate comfort level or baseline comfort level  Description: Interventions:  - Encourage patient to monitor pain and request assistance  - Assess pain using appropriate pain scale  - Administer analgesics based on type and severity of pain and evaluate response  - Implement non-pharmacological measures as appropriate and evaluate response  - Consider cultural and social influences on pain and pain management  - Notify physician/advanced practitioner if interventions unsuccessful or patient reports new pain  Outcome: Progressing     Problem: INFECTION - ADULT  Goal: Absence or prevention of progression during hospitalization  Description: INTERVENTIONS:  - Assess and monitor for signs and symptoms of infection  - Monitor lab/diagnostic results  - Monitor all insertion sites, i.e. indwelling lines, tubes, and drains  - Monitor endotracheal if appropriate and nasal secretions for changes in amount and color  - Manson appropriate cooling/warming therapies per order  - Administer medications as ordered  - Instruct and encourage patient and family to use good hand hygiene technique  - Identify and instruct in appropriate isolation precautions for identified infection/condition  Outcome: Progressing  Goal: Absence of fever/infection during neutropenic period  Description: INTERVENTIONS:  - Monitor WBC    Outcome: Progressing     Problem: SAFETY ADULT  Goal: Patient will remain free of falls  Description: INTERVENTIONS:  - Educate patient/family on patient safety including physical limitations  - Instruct patient to call for assistance with activity   - Consult OT/PT to assist with strengthening/mobility   - Keep Call bell within reach  - Keep bed low and locked with side rails adjusted as appropriate  - Keep care items and personal belongings within reach  - Initiate and maintain comfort rounds  - Make Fall Risk Sign visible to staff  - Offer Toileting   - Apply  yellow socks and bracelet for high fall risk patients  - Consider moving patient to room near nurses station  Outcome: Progressing  Goal: Maintain or return to baseline ADL function  Description: INTERVENTIONS:  -  Assess patient's ability to carry out ADLs; assess patient's baseline for ADL function and identify physical deficits which impact ability to perform ADLs (bathing, care of mouth/teeth, toileting, grooming, dressing, etc.)  - Assess/evaluate cause of self-care deficits   - Assess range of motion  - Assess patient's mobility; develop plan if impaired  - Assess patient's need for assistive devices and provide as appropriate  - Encourage maximum independence but intervene and supervise when necessary  - Involve family in performance of ADLs  - Assess for home care needs following discharge   - Consider OT consult to assist with ADL evaluation and planning for discharge  - Provide patient education as appropriate  Outcome: Progressing  Goal: Maintains/Returns to pre admission functional level  Description: INTERVENTIONS:  - Perform AM-PAC 6 Click Basic Mobility/ Daily Activity assessment daily.  - Set and communicate daily mobility goal to care team and patient/family/caregiver.   - Collaborate with rehabilitation services on mobility goals if consulted  - Perform Range of Motion   - Record patient progress and toleration of activity level   Outcome: Progressing     Problem: DISCHARGE PLANNING  Goal: Discharge to home or other facility with appropriate resources  Description: INTERVENTIONS:  - Identify barriers to discharge w/patient and caregiver  - Arrange for needed discharge resources and transportation as appropriate  - Identify discharge learning needs (meds, wound care, etc.)  - Arrange for interpretive services to assist at discharge as needed  - Refer to Case Management Department for coordinating discharge planning if the patient needs post-hospital services based on physician/advanced practitioner  order or complex needs related to functional status, cognitive ability, or social support system  Outcome: Progressing     Problem: Knowledge Deficit  Goal: Patient/family/caregiver demonstrates understanding of disease process, treatment plan, medications, and discharge instructions  Description: Complete learning assessment and assess knowledge base.  Interventions:  - Provide teaching at level of understanding  - Provide teaching via preferred learning methods  Outcome: Progressing     Problem: Nutrition/Hydration-ADULT  Goal: Nutrient/Hydration intake appropriate for improving, restoring or maintaining nutritional needs  Description: Monitor and assess patient's nutrition/hydration status for malnutrition. Collaborate with interdisciplinary team and initiate plan and interventions as ordered.  Monitor patient's weight and dietary intake as ordered or per policy. Utilize nutrition screening tool and intervene as necessary. Determine patient's food preferences and provide high-protein, high-caloric foods as appropriate.     INTERVENTIONS:  - Monitor oral intake, urinary output, labs, and treatment plans  - Assess nutrition and hydration status and recommend course of action  - Evaluate amount of meals eaten  - Assist patient with eating if necessary   - Allow adequate time for meals  - Recommend/ encourage appropriate diets, oral nutritional supplements, and vitamin/mineral supplements  - Order, calculate, and assess calorie counts as needed  - Recommend, monitor, and adjust tube feedings and TPN/PPN based on assessed needs  - Assess need for intravenous fluids  - Provide specific nutrition/hydration education as appropriate  - Include patient/family/caregiver in decisions related to nutrition  Outcome: Progressing

## 2024-03-25 NOTE — ASSESSMENT & PLAN NOTE
EKG today shows sinus tachycardia.   Rate controlled with Lopressor 50 mg BID  Eliquis 5 mg BID     Plan:   -Continue Eliquis   - Hold Lopressor due to low BP for now, resume as soon as possible once BP allows.

## 2024-03-25 NOTE — ASSESSMENT & PLAN NOTE
Currently in AF, tachycardia improved with treatment of ETOH WD  Holding apixaban out of concern for GIB

## 2024-03-25 NOTE — ASSESSMENT & PLAN NOTE
Patient with recent described history of melena, scant dried blood on rectum with positive bedside Hemoccult performed by me, hypotension, anticoagulation on apixaban.  Hemoglobin stable with severe thrombocytopenia secondary to alcohol use disorder.  Prior endoscopy shows gastritis and diverticulosis.  No known history of varices but he is at risk with his alcohol use disorder.  Starting IV Protonix bolus and infusion.  Ceftriaxone already started for his UTI.  Transfer to other [facility] facility for access to emergent endoscopy and critical care if needed.

## 2024-03-25 NOTE — ASSESSMENT & PLAN NOTE
Lab Results   Component Value Date    EGFR 68 03/26/2024    EGFR 43 03/25/2024    EGFR 42 03/25/2024    CREATININE 1.15 03/26/2024    CREATININE 1.67 (H) 03/25/2024    CREATININE 1.69 (H) 03/25/2024     Baseline Creatinine is between 1 to 1.3.   Elevated likely prerenal due to dehydration and alcohol abuse.   Patient has had elevated creatinine levels in prior hospitalizations as well.   RESOLVED

## 2024-03-25 NOTE — ASSESSMENT & PLAN NOTE
On admission 1.2 likely malnourishment in setting of alcohol abuse.   Repleted with 4 gr IV.   S/p repletion Hypomagnesemia has resolved.     Plan:   Monitor electrolytes  Replete as need for Mg> 2.0

## 2024-03-25 NOTE — ASSESSMENT & PLAN NOTE
Patient hypotensive in the ED. : BP 68 /47 ( MAP 54 mmHg)  S/P 2 L IV normal saline and albumin 12.5.   MAP increased, stable over 65.   Home medication lisinopril 10 mg daily, Lopressor 50 mg BID.   Differential dg include dehydration, medication toxicity due to mixing doses, septic shock less likely due to lacking WBC elevation, fever.   No signs of active bleeding, his Hb remains stable.     Plan:   - MIVF @125ml/ hr.   - hold home medication Lisinopril, lopressor.

## 2024-03-25 NOTE — ASSESSMENT & PLAN NOTE
H/o chronic daily alcohol consumption, denies h/o withdrawal seizures  Last drink: 3/25/24  Ethanol lvl: 373  Follow SEWS protocol with symptom-triggered phenobarbital for medically-assisted alcohol withdrawal  Current symptoms include anxiety, agitation/impulsiveness, tremors, diaphoresis   SEWS score upon admission 13   Administer 650 mg phenobarbital   Telemetry monitoring   Continue to monitor vitals, symptoms    Anticipate continued and worsening alcohol withdrawal as patient metabolizes his ethanol.  Toxicology available for further assistance upon request after transfer.

## 2024-03-25 NOTE — EMTALA/ACUTE CARE TRANSFER
[address] INPATIENT DETOX  [address] [facility] [address]  [phone]  Dept: [phone]      ACUTE CARE TRANSFER CONSENT    NAME Gregg Partida                                         1960                              MRN [id number]    I have been informed of my rights regarding examination, treatment, and transfer   by Dr. Jens Pearce,     Benefits: Higher Level of Care, Speciality      Risks:  deterioration of condition,risk of MVA during transport       Transfer Request:  I acknowledge that my medical condition has been evaluated and explained to me by the treating physician or other qualified medical person and/or my attending physician who has recommended and offered to me further medical examination and treatment. I understand the Hospital's obligation with respect to the treatment and stabilization of my medical condition. I nevertheless request to be transferred. I release the Hospital, the doctor, and any other persons caring for me from all responsibility or liability for any injury or ill effects that may result from my transfer and agree to accept all responsibility for the consequences of my choice to transfer, rather than receive stabilizing treatment at the Hospital. I understand that because the transfer is my request, my insurance may not provide reimbursement for the services.  The Hospital will assist and direct me and my family in how to make arrangements for transfer, but the hospital is not liable for any fees charged by the transport service.  In spite of this understanding, I refuse to consent to further medical examination and treatment which has been offered to me, and request transfer to  . I authorize the performance of emergency medical procedures and treatments upon me in both transit and upon arrival at the receiving facility.  Additionally, I authorize the release of any and all medical records to the receiving facility and request  they be transported with me, if possible.    I authorize the performance of emergency medical procedures and treatments upon me in both transit and upon arrival at the receiving facility.  Additionally, I authorize the release of any and all medical records to the receiving facility and request they be transported with me, if possible.  I understand that the safest mode of transportation during a medical emergency is an ambulance and that the Hospital advocates the use of this mode of transport. Risks of traveling to the receiving facility by car, including absence of medical control, life sustaining equipment, such as oxygen, and medical personnel has been explained to me and I fully understand them.    (JACQUES CORRECT BOX BELOW)  [  ]  I consent to the stated transfer and to be transported by ambulance/helicopter.  [  ]  I consent to the stated transfer, but refuse transportation by ambulance and accept full responsibility for my transportation by car.  I understand the risks of non-ambulance transfers and I exonerate the Hospital and its staff from any deterioration in my condition that results from this refusal.    X___________________________________________    DATE  24  TIME________  Signature of patient or legally responsible individual signing on patient behalf           RELATIONSHIP TO PATIENT_________________________          Provider Certification    NAME Gregg Partida                                         1960                              MRN [id number]    A medical screening exam was performed on the above named patient.  Based on the examination:    Condition Necessitating Transfer GI Bleed, Alcohol withdrawal     Patient Condition:  stable for transfer    Reason for Transfer:  Higher level of care     Transfer Requirements: Facility   [facility] ED   Space available and qualified personnel available for treatment as acknowledged by  Dr. Jones   Agreed to accept transfer and  to provide appropriate medical treatment as acknowledged by        Dr. Jones   Appropriate medical records of the examination and treatment of the patient are provided at the time of transfer   STAFF INITIAL WHEN COMPLETED _______  Transfer will be performed by qualified personnel from    and appropriate transfer equipment as required, including the use of necessary and appropriate life support measures.    Provider Certification: I have examined the patient and explained the following risks and benefits of being transferred/refusing transfer to the patient/family:         Based on these reasonable risks and benefits to the patient and/or the unborn child(sami), and based upon the information available at the time of the patient’s examination, I certify that the medical benefits reasonably to be expected from the provision of appropriate medical treatments at another medical facility outweigh the increasing risks, if any, to the individual’s medical condition, and in the case of labor to the unborn child, from effecting the transfer.    X____________________________________________ DATE 03/25/24        TIME_______      ORIGINAL - SEND TO MEDICAL RECORDS   COPY - SEND WITH PATIENT DURING TRANSFER

## 2024-03-25 NOTE — ASSESSMENT & PLAN NOTE
Hx of COPD, Smokes 1 pack a day per chart review.   Breo Ellipta and albuterol PRN home med     Plan:   - Continue home inhalers,

## 2024-03-25 NOTE — ASSESSMENT & PLAN NOTE
Wt Readings from Last 3 Encounters:   03/25/24 86.2 kg (190 lb)   03/25/24 87.7 kg (193 lb 5.5 oz)   03/09/24 89.2 kg (196 lb 10.4 oz)

## 2024-03-25 NOTE — H&P
Formerly [facility], 1928–1983  Progress Note  Name: Gregg Partida I  MRN: [id number]  Unit/Bed#: ICU 08 I Date of Admission: 3/25/2024   Date of Service: 3/25/2024 I Hospital Day: 0    Assessment/Plan   * Hypotension  Assessment & Plan  Patient hypotensive in the ED. : BP 68 /47 ( MAP 54 mmHg)  S/P 2 L IV normal saline and albumin 12.5.   MAP increased, stable over 65.   Home medication lisinopril 10 mg daily, Lopressor 50 mg BID.   Differential dg include dehydration, medication toxicity due to mixing doses, septic shock less likely due to lacking WBC elevation, fever.   No signs of active bleeding, his Hb remains stable.     Plan:   - MIVF @125ml/ hr.   - hold home medication Lisinopril, lopressor.       Alcohol abuse  Assessment & Plan  Multiple hospitalizations for alcohol withdrawal.   Drinks quart on vodka a day .   Naltrexone in the past.     Given Phenobarbital in the ED.    Plan:   - [facility] protocol  - hydration with maintenance fluids.   - folate and thiamine supplementation      Acute kidney injury superimposed on CKD   Assessment & Plan  Lab Results   Component Value Date    EGFR 43 03/25/2024    EGFR 42 03/25/2024    EGFR 36 03/25/2024    CREATININE 1.67 (H) 03/25/2024    CREATININE 1.69 (H) 03/25/2024    CREATININE 1.92 (H) 03/25/2024     Baseline Creatinine is between 1 to 1.3.   Elevated likely prerenal due to dehydration and alcohol abuse.   Patient has had elevated creatinine levels in prior hospitalizations as well.     Plan:  - Avoid nephrotoxic agents.  - monitor daily weights.  - monitor I/O  - trend Creatinine level and consider adding more IV fluids if Cr. Fails to improve     A-fib ([facility])  Assessment & Plan  EKG today shows sinus tachycardia.   Rate controlled with Lopressor 50 mg BID  Eliquis 5 mg BID     Plan:   -Continue Eliquis   - Hold Lopressor due to low BP for now, resume as soon as possible once BP allows.       COPD (chronic obstructive pulmonary disease) ([facility])  Assessment  & Plan  Hx of COPD, Smokes 1 pack a day per chart review.   Breo Ellipta and albuterol PRN home med     Plan:   - Continue home inhalers,     Hx of CABG  Assessment & Plan  Bypass surgery in 2004,   Home med : pravastatin, aspirin.   - continue     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 disease (HCC)  Assessment & Plan  Lab Results   Component Value Date    HGBA1C 5.4 01/24/2024       Recent Labs     03/25/24  0250   POCGLU 113       Blood Sugar Average: Last 72 hrs:    Home medication Metformin 500 mg daily.   Glucose normal.    Plan:   - Hold metformin   - ACHS checks        Hypomagnesemia  Assessment & Plan  On admission 1.2 likely malnourishment in setting of alcohol abuse.   Repleted with 4 gr IV.   S/p repletion Hypomagnesemia has resolved.     Plan:   Monitor electrolytes  Replete as need for Mg> 2.0     History of prostate cancer  Assessment & Plan  Prostate cancer in 2020, s/p resection in 2021  - continue home med flomax.             Disposition: Critical care       History of Present Illness     HPI: Gregg Partida is a 63 y.o. with hx of alcohol abuse, A-fib on Eliquis and lopressor, CAD with hx of bypass, COPD who called EMS wanting to get check out after drinking . Patient was intoxicated on presentation, he was oriented x 3 with slurred speech. Patient received phenobarbital for alcohol withdrawal and became hypotensive. Due to the possibility of needing vasopressors he was transferred to [facility] ICU.  Patient has had multiple hospitalizations in the past due to alcohol intoxication.     History obtained from chart review.  Review of Systems   Unable to perform ROS: Mental status change      Historical Information   Past Medical History:  No date: Acute on chronic kidney failure   06/07/2019: Alcohol withdrawal (HCC)  No date: Atrial fibrillation (HCC)  No date: Cancer (HCC)      Comment:  prostate ca,had radiation  No date: Cardiac disease      Comment:  stents,then triple  bypass  No date: COPD (chronic obstructive pulmonary disease) ([facility])  No date: Coronary artery disease  No date: ETOH abuse  No date: Heart failure ([facility])  No date: History of heart surgery      Comment:  says [facility]  No date: Hx of heart artery stent      Comment:  2014  No date: Hyperlipidemia  No date: Hypertension  12/22/2019: Hypovolemic shock ([facility])  10/13/2022: Lumbar spondylitis ([facility])  10/10/2022: Nasal bone fracture  No date: Prostate CA ([facility])  No date: S/P CABG x 3      Comment:  2004  No date: Sleep apnea Past Surgical History:  No date: CARDIAC CATHETERIZATION      Comment:  2 stents  2004: CORONARY ARTERY BYPASS GRAFT  12/16/2020: [facility] ANT INTERBODY MIN DSC CRV BELOW C2; N/A      Comment:  Procedure: Anterior cervical discectomy with fusion                C4-C7; Posterior cervical decompression and fusion C2-T2;               Surgeon: David Rowell MD;  Location: [facility];                 Service: Neurosurgery  No date: TONSILLECTOMY   Current Outpatient Medications   Medication Instructions    albuterol (Ventolin HFA) 90 mcg/act inhaler 2 puffs, Inhalation, Every 4 hours PRN    aluminum-magnesium hydroxide-simethicone (MAALOX) 7840-6128-629 mg/30 mL suspension 30 mL, Oral, Every 4 hours PRN    apixaban (ELIQUIS) 5 mg, Oral, 2 times daily    aspirin (ECOTRIN LOW STRENGTH) 81 mg, Oral, Daily    Blood Glucose Monitoring Suppl (ONE TOUCH ULTRA MINI) w/Device KIT Use as directed    Blood Pressure Monitoring (Adult Blood Pressure Cuff Lg) KIT Does not apply, Daily    Breo Ellipta 200-25 MCG/ACT inhaler 1 puff, Inhalation, Daily, Rinse mouth after use.    ferrous sulfate 325 mg, Oral, Daily with breakfast    folic acid (FOLVITE) 1 mg tablet TAKE 1 TABLET DAILY    gabapentin (NEURONTIN) 300 mg capsule TAKE ONE CAPSULE THREE TIMES DAILY    lisinopril (ZESTRIL) 10 mg, Oral, Daily    magnesium Oxide (MAG-OX) 400 mg, Oral, 2 times daily    metFORMIN (GLUCOPHAGE) 500 mg, Oral, Daily with  breakfast    metoprolol tartrate (LOPRESSOR) 50 mg, Oral, Every 12 hours scheduled    naltrexone (REVIA) 50 mg, Oral, Daily    nicotine (NICODERM CQ) 21 mg/24 hr TD 24 hr patch 1 patch, Transdermal, Daily    ONETOUCH DELICA LANCETS FINE MISC 3 times daily, Test    pantoprazole (PROTONIX) 40 mg tablet TAKE 1 TABLET TWO TIMES A DAY    pravastatin (PRAVACHOL) 40 mg tablet TAKE 1 TABLET DAILY WITH DINNER    ranolazine (RANEXA) 500 mg 12 hr tablet TAKE 1 TABLET EVERY 12 HOURS    senna-docusate sodium (SENOKOT S) 8.6-50 mg per tablet 1 tablet, Oral, Daily PRN    tamsulosin (FLOMAX) 0.4 mg TAKE 1 CAPSULE DAILY    Thiamine HCl (vitamin B-1) 100 MG TABS TAKE 1 TABLET DAILY    varenicline (CHANTIX) 1 mg, Oral, 2 times daily    No Known Allergies   Social History     Tobacco Use    Smoking status: Every Day     Current packs/day: 1.50     Average packs/day: 1.5 packs/day for 40.0 years (60.0 ttl pk-yrs)     Types: Cigarettes    Smokeless tobacco: Never   Vaping Use    Vaping status: Never Used   Substance Use Topics    Alcohol use: Yes     Alcohol/week: 4.0 standard drinks of alcohol     Types: 4 Standard drinks or equivalent per week     Comment: quart of vodka daily    Drug use: No    Family History   Problem Relation Age of Onset    Diabetes Mother     Uterine cancer Mother     COPD Father     Hypertension Father           Objective                            Vitals I/O      Most Recent Min/Max in 24hrs   Temp 98.1 °F (36.7 °C) Temp  Min: 97.5 °F (36.4 °C)  Max: 98.1 °F (36.7 °C)   Pulse 100 Pulse  Min: 83  Max: 114   Resp 20 Resp  Min: 14  Max: 22   /61 BP  Min: 68/45  Max: 135/72   O2 Sat 92 % SpO2  Min: 92 %  Max: 100 %    No intake or output data in the 24 hours ending 03/25/24 3949    Diet Regular; Regular House    Invasive Monitoring   none        Physical Exam   Physical Exam  Skin:     General: Skin is warm.      Coloration: Skin is not jaundiced or pale.   HENT:      Head: Normocephalic and atraumatic.       Nose: No nasogastric tube.   Cardiovascular:      Rate and Rhythm: Tachycardia present.      Heart sounds: No murmur heard.  Musculoskeletal:      Right lower leg: No edema.      Left lower leg: No edema.   Abdominal:      Palpations: Abdomen is soft.      Tenderness: There is no abdominal tenderness. There is no guarding.   Constitutional:       Appearance: He is toxic-appearing.      Interventions: He is not intubated and not restrained.  Pulmonary:      Effort: Pulmonary effort is normal. No accessory muscle usage, respiratory distress or accessory muscle usage. He is not intubated.   Neurological:      Comments: lethargic            Diagnostic Studies      EKG: sinus tachycardia   Imaging: CT heat w/o contrast with no acute intracranial abnormalities. CT chest abdomen and pelvis: showing hepatic steatosis, adrenal nodules that are unchanged compared to previous imaging, fractured ribs 10th and 11th that are healing.I have personally reviewed pertinent reports.       Medications:  Scheduled PRN   apixaban, 5 mg, BID  aspirin, 81 mg, Daily  [START ON 3/26/2024] ferrous sulfate, 325 mg, Daily With Breakfast  fluticasone-vilanterol, 1 puff, Daily  folic acid, 1,000 mcg, Daily  [START ON 3/26/2024] folic acid 1 mg in sodium chloride 0.9 % 50 mL IVPB, 1 mg, Daily  metoprolol tartrate, 50 mg, Q12H SEDA  pantoprazole, 80 mg, Once  pravastatin, 40 mg, Daily With Dinner  tamsulosin, 0.4 mg, Daily  vitamin B-1, 100 mg, Daily      acetaminophen, 650 mg, Q6H PRN  albuterol, 2 puff, Q4H PRN  aluminum-magnesium hydroxide-simethicone, 30 mL, Q4H PRN  ondansetron, 4 mg, Q6H PRN  senna-docusate sodium, 1 tablet, Daily PRN       Continuous    lactated ringers, 125 mL/hr, Last Rate: 125 mL/hr (03/25/24 1439)         Labs:    CBC    Recent Labs     03/25/24  0300 03/25/24  1127 03/25/24  1439   WBC 6.47 4.96  --    HGB 13.1 10.9* 10.2*   HCT 38.5 31.9*  --    PLT 63* 46*  --    BANDSPCT 2  --   --      BMP    Recent Labs      03/25/24  1032 03/25/24  1127   SODIUM 133* 135   K 3.6 3.5   CL 97 99   CO2 22 22   AGAP 14* 14*   BUN 24 23   CREATININE 1.69* 1.67*   CALCIUM 7.8* 7.6*       Coags    No recent results     Additional Electrolytes  Recent Labs     03/25/24  0300 03/25/24  1032 03/25/24  1127   MG 1.2* 1.8* 1.9   PHOS 2.0*  --   --           Blood Gas    No recent results  No recent results LFTs  Recent Labs     03/25/24  1032 03/25/24  1127   ALT 50 47   AST 89* 88*   ALKPHOS 71 67   ALB 3.8 3.6   TBILI 0.90 0.82       Infectious  Recent Labs     03/25/24  1206   PROCALCITONI 0.35*     Glucose  Recent Labs     03/25/24  0300 03/25/24  1032 03/25/24  1127   GLUC 97 112 84               Aylin Wood MD

## 2024-03-25 NOTE — ASSESSMENT & PLAN NOTE
Lab Results   Component Value Date    HGBA1C 5.4 01/24/2024       Recent Labs     03/25/24  0250   POCGLU 113       Blood Sugar Average: Last 72 hrs:    Home medication Metformin 500 mg daily.   Glucose normal.    Plan:   - Hold metformin   - ACHS checks

## 2024-03-25 NOTE — ED PROVIDER NOTES
"History  Chief Complaint   Patient presents with    Alcohol Intoxication     Pt called 911 himself (lives home alone) c/o etoh intoxication sts has not taken meds in a few days \"because i can't take the medication when I'm drunk I thought you could check me out\" pt oriented x3 speech slurred. Moving extremities x 4 denies any medical complaints or pain at this time. Denies recent falls but noted with various areas of ecchymosis to back and arms.      63-year-old male past medical history significant for alcohol abuse, paroxysmal A-fib on Eliquis presenting to ED today with alcohol tox occasion.  Patient called EMS because of his alcohol intoxication but when he says why he says that because he was drunk and he just thought he should get checked out.  Has been drunk before though but he is not clear why he needs to get checked out today.  He is oriented x 3 but does have slurred speech secondary to the alcohol intoxication.  Denying any pain anywhere.  When asked about the ecchymosis on his back he says that he has had falls but no falls today.        Prior to Admission Medications   Prescriptions Last Dose Informant Patient Reported? Taking?   Blood Glucose Monitoring Suppl (ONE TOUCH ULTRA MINI) w/Device KIT Unknown Self Yes No   Sig: Use as directed   Blood Pressure Monitoring (Adult Blood Pressure Cuff Lg) KIT Unknown  No No   Sig: Use in the morning   Breo Ellipta 200-25 MCG/ACT inhaler Unknown Self No No   Sig: Inhale 1 puff daily Rinse mouth after use.   ONETOUCH DELICA LANCETS FINE MISC Unknown Self Yes No   Sig: 3 (three) times a day Test   Thiamine HCl (vitamin B-1) 100 MG TABS Unknown Self No No   Sig: TAKE 1 TABLET DAILY   albuterol (Ventolin HFA) 90 mcg/act inhaler Unknown Self No No   Sig: Inhale 2 puffs every 4 (four) hours as needed for wheezing   aluminum-magnesium hydroxide-simethicone (MAALOX) 2600-7913-256 mg/30 mL suspension Unknown  No No   Sig: Take 30 mL by mouth every 4 (four) hours as needed " for indigestion or heartburn   apixaban (ELIQUIS) 5 mg Unknown  No No   Sig: Take 1 tablet (5 mg total) by mouth 2 (two) times a day Do not start before January 31, 2024.   aspirin (ECOTRIN LOW STRENGTH) 81 mg EC tablet Unknown Self Yes No   Sig: Take 81 mg by mouth daily   ferrous sulfate 325 (65 Fe) mg tablet Unknown Self No No   Sig: Take 1 tablet (325 mg total) by mouth daily with breakfast   folic acid (FOLVITE) 1 mg tablet Unknown  No No   Sig: TAKE 1 TABLET DAILY   gabapentin (NEURONTIN) 300 mg capsule Unknown  No No   Sig: TAKE ONE CAPSULE THREE TIMES DAILY   lisinopril (ZESTRIL) 20 mg tablet Unknown Self No No   Sig: Take 0.5 tablets (10 mg total) by mouth daily   magnesium Oxide (MAG-OX) 400 mg TABS Unknown  No No   Sig: Take 1 tablet (400 mg total) by mouth 2 (two) times a day   metFORMIN (GLUCOPHAGE) 500 mg tablet Unknown Self No No   Sig: Take 1 tablet (500 mg total) by mouth daily with breakfast Do not start before September 23, 2023.   metoprolol tartrate (LOPRESSOR) 50 mg tablet Unknown  No No   Sig: Take 1 tablet (50 mg total) by mouth every 12 (twelve) hours   naltrexone (REVIA) 50 mg tablet Unknown Self No No   Sig: Take 1 tablet (50 mg total) by mouth daily   nicotine (NICODERM CQ) 21 mg/24 hr TD 24 hr patch Unknown Self No No   Sig: Place 1 patch on the skin over 24 hours daily Do not start before December 4, 2023.   pantoprazole (PROTONIX) 40 mg tablet Unknown  No No   Sig: TAKE 1 TABLET TWO TIMES A DAY   pravastatin (PRAVACHOL) 40 mg tablet Unknown Self No No   Sig: TAKE 1 TABLET DAILY WITH DINNER   ranolazine (RANEXA) 500 mg 12 hr tablet Unknown Self No No   Sig: TAKE 1 TABLET EVERY 12 HOURS   senna-docusate sodium (SENOKOT S) 8.6-50 mg per tablet Unknown  No No   Sig: Take 1 tablet by mouth daily as needed for constipation   tamsulosin (FLOMAX) 0.4 mg Unknown Self No No   Sig: TAKE 1 CAPSULE DAILY   varenicline (CHANTIX) 1 mg tablet Unknown  No No   Sig: TAKE 1 TABLET 2 TIMES A DAY       Facility-Administered Medications: None       Past Medical History:   Diagnosis Date    Acute on chronic kidney failure      Alcohol withdrawal (HCC) 06/07/2019    Atrial fibrillation (HCC)     Cancer (HCC)     prostate ca,had radiation    Cardiac disease     stents,then triple bypass    COPD (chronic obstructive pulmonary disease) (HCC)     Coronary artery disease     ETOH abuse     Heart failure (HCC)     History of heart surgery     says triple bypass [facility]    Hx of heart artery stent     2014    Hyperlipidemia     Hypertension     Hypovolemic shock ([facility]) 12/22/2019    Lumbar spondylitis ([facility]) 10/13/2022    Nasal bone fracture 10/10/2022    Prostate CA (HCC)     S/P CABG x 3     2004    Sleep apnea        Past Surgical History:   Procedure Laterality Date    CARDIAC CATHETERIZATION      2 stents    CORONARY ARTERY BYPASS GRAFT  2004    IL ARTHRD ANT INTERBODY MIN DSC CRV BELOW C2 N/A 12/16/2020    Procedure: Anterior cervical discectomy with fusion C4-C7; Posterior cervical decompression and fusion C2-T2;  Surgeon: David Rowell MD;  Location: [facility];  Service: Neurosurgery    TONSILLECTOMY         Family History   Problem Relation Age of Onset    Diabetes Mother     Uterine cancer Mother     COPD Father     Hypertension Father      I have reviewed and agree with the history as documented.    E-Cigarette/Vaping    E-Cigarette Use Never User      E-Cigarette/Vaping Substances    Nicotine Yes     THC No     CBD No     Flavoring No     Other No     Unknown No      Social History     Tobacco Use    Smoking status: Every Day     Current packs/day: 1.50     Average packs/day: 1.5 packs/day for 40.0 years (60.0 ttl pk-yrs)     Types: Cigarettes    Smokeless tobacco: Never   Vaping Use    Vaping status: Never Used   Substance Use Topics    Alcohol use: Yes     Alcohol/week: 4.0 standard drinks of alcohol     Types: 4 Standard drinks or equivalent per week     Comment: quart of vodka daily    Drug use: No        Review of Systems   Constitutional:  Negative for chills and fever.   HENT:  Negative for hearing loss.    Eyes:  Negative for visual disturbance.   Respiratory:  Negative for shortness of breath.    Cardiovascular:  Negative for chest pain.   Gastrointestinal:  Negative for abdominal pain, constipation, diarrhea, nausea and vomiting.   Genitourinary:  Negative for difficulty urinating.   Musculoskeletal:  Negative for myalgias.   Skin:  Negative for rash.   Neurological:  Negative for dizziness.   Psychiatric/Behavioral:  Negative for agitation.    All other systems reviewed and are negative.      Physical Exam  Physical Exam  Vitals and nursing note reviewed.   Constitutional:       General: He is not in acute distress.     Appearance: Normal appearance. He is not ill-appearing.   HENT:      Head: Normocephalic and atraumatic.      Right Ear: External ear normal.      Left Ear: External ear normal.      Nose: Nose normal. No congestion.      Mouth/Throat:      Mouth: Mucous membranes are moist.      Pharynx: Oropharynx is clear. No oropharyngeal exudate.   Eyes:      General:         Right eye: No discharge.         Left eye: No discharge.      Extraocular Movements: Extraocular movements intact.      Conjunctiva/sclera: Conjunctivae normal.      Pupils: Pupils are equal, round, and reactive to light.   Cardiovascular:      Rate and Rhythm: Normal rate and regular rhythm.      Pulses: Normal pulses.      Heart sounds: Normal heart sounds.   Pulmonary:      Effort: Pulmonary effort is normal. No respiratory distress.      Breath sounds: Normal breath sounds. No wheezing.   Abdominal:      General: Abdomen is flat. Bowel sounds are normal. There is no distension.      Palpations: Abdomen is soft.      Tenderness: There is no abdominal tenderness.   Musculoskeletal:         General: No swelling or deformity. Normal range of motion.      Cervical back: Normal range of motion. No rigidity.   Skin:     General:  Skin is warm and dry.      Capillary Refill: Capillary refill takes less than 2 seconds.      Comments: Ecchymosis present to the back. Different stages of healing   Neurological:      General: No focal deficit present.      Mental Status: He is alert and oriented to person, place, and time. Mental status is at baseline.      Cranial Nerves: No cranial nerve deficit.      Sensory: No sensory deficit.      Motor: No weakness.      Gait: Gait normal.      Comments: Patient clearly intoxicated on exam   Psychiatric:         Mood and Affect: Mood normal.         Behavior: Behavior normal.         Vital Signs  ED Triage Vitals [03/25/24 0249]   Temperature Pulse Respirations Blood Pressure SpO2   97.5 °F (36.4 °C) 101 22 (!) 68/47 97 %      Temp Source Heart Rate Source Patient Position - Orthostatic VS BP Location FiO2 (%)   Temporal Monitor Lying Right arm --      Pain Score       No Pain           Vitals:    03/25/24 0545 03/25/24 0600 03/25/24 0615 03/25/24 0630   BP: 108/64 96/55 98/59 97/60   Pulse: 83 88 86 (!) 109   Patient Position - Orthostatic VS: Lying Lying Sitting Lying         Visual Acuity      ED Medications  Medications   sodium chloride 0.9 % bolus 1,000 mL (0 mL Intravenous Stopped 3/25/24 0345)   iohexol (OMNIPAQUE) 350 MG/ML injection (MULTI-DOSE) 100 mL (100 mL Intravenous Given 3/25/24 0344)   sodium chloride 0.9 % bolus 1,000 mL (0 mL Intravenous Stopped 3/25/24 0439)   albumin human (FLEXBUMIN) 5 % injection 12.5 g (0 g Intravenous Stopped 3/25/24 0556)       Diagnostic Studies  Results Reviewed       Procedure Component Value Units Date/Time    Urine Microscopic [[id number]]  (Abnormal) Collected: 03/25/24 0511    Lab Status: Final result Specimen: Urine, Clean Catch Updated: 03/25/24 0542     RBC, UA 0-1 /hpf      WBC, UA 1-2 /hpf      Epithelial Cells Occasional /hpf      Bacteria, UA Occasional /hpf      Hyaline Casts, UA 0-1 /lpf     Rapid drug screen, urine [[id number]]  (Normal) Collected:  03/25/24 0511    Lab Status: Final result Specimen: Urine, Clean Catch Updated: 03/25/24 0540     Amph/Meth UR Negative     Barbiturate Ur Negative     Benzodiazepine Urine Negative     Cocaine Urine Negative     Methadone Urine Negative     Opiate Urine Negative     PCP Ur Negative     THC Urine Negative     Oxycodone Urine Negative    Narrative:      FOR MEDICAL PURPOSES ONLY.   IF CONFIRMATION NEEDED PLEASE CONTACT THE LAB WITHIN 5 DAYS.    Drug Screen Cutoff Levels:  AMPHETAMINE/METHAMPHETAMINES  1000 ng/mL  BARBITURATES     200 ng/mL  BENZODIAZEPINES     200 ng/mL  COCAINE      300 ng/mL  METHADONE      300 ng/mL  OPIATES      300 ng/mL  PHENCYCLIDINE     25 ng/mL  THC       50 ng/mL  OXYCODONE      100 ng/mL    UA w Reflex to Microscopic w Reflex to Culture [[id number]]  (Abnormal) Collected: 03/25/24 0511    Lab Status: Final result Specimen: Urine, Clean Catch Updated: 03/25/24 0528     Color, UA Yellow     Clarity, UA Slightly Cloudy     Specific Gravity, UA <=1.005     pH, UA 6.0     Leukocytes, UA Negative     Nitrite, UA Positive     Protein,  (2+) mg/dl      Glucose, UA Negative mg/dl      Ketones, UA Negative mg/dl      Urobilinogen, UA 0.2 E.U./dl      Bilirubin, UA Small     Occult Blood, UA Trace-Intact    RBC Morphology Reflex Test [[id number]] Collected: 03/25/24 0300    Lab Status: Final result Specimen: Blood from Arm, Left Updated: 03/25/24 0401    CBC and differential [[id number]]  (Abnormal) Collected: 03/25/24 0300    Lab Status: Final result Specimen: Blood from Arm, Left Updated: 03/25/24 0333     WBC 6.47 Thousand/uL      RBC 3.97 Million/uL      Hemoglobin 13.1 g/dL      Hematocrit 38.5 %      MCV 97 fL      MCH 33.0 pg      MCHC 34.0 g/dL      RDW 15.2 %      MPV 12.3 fL      Platelets 63 Thousands/uL     Narrative:      This is an appended report.  These results have been appended to a previously verified report.    Manual Differential(PHLEBS Do Not Order) [[id number]]  (Abnormal)  Collected: 03/25/24 0300    Lab Status: Final result Specimen: Blood from Arm, Left Updated: 03/25/24 0333     Segmented % 68 %      Bands % 2 %      Lymphocytes % 17 %      Monocytes % 8 %      Eosinophils % 0 %      Basophils % 1 %      Atypical Lymphocytes % 4 %      Absolute Neutrophils 4.53 Thousand/uL      Absolute Lymphocytes 1.36 Thousand/uL      Absolute Monocytes 0.52 Thousand/uL      Absolute Eosinophils 0.00 Thousand/uL      Absolute Basophils 0.06 Thousand/uL      Total Counted --     RBC Morphology Present     Platelet Estimate Decreased     Anisocytosis Present     Macrocytes Present    Comprehensive metabolic panel [[id number]]  (Abnormal) Collected: 03/25/24 0300    Lab Status: Final result Specimen: Blood from Arm, Left Updated: 03/25/24 0320     Sodium 130 mmol/L      Potassium 4.0 mmol/L      Chloride 94 mmol/L      CO2 20 mmol/L      ANION GAP 16 mmol/L      BUN 22 mg/dL      Creatinine 1.92 mg/dL      Glucose 97 mg/dL      Calcium 8.5 mg/dL      AST 97 U/L      ALT 56 U/L      Alkaline Phosphatase 84 U/L      Total Protein 7.0 g/dL      Albumin 3.8 g/dL      Total Bilirubin 1.02 mg/dL      eGFR 36 ml/min/1.73sq m     Narrative:      National Kid[employer] Disease Foundation guidelines for Chronic Kidney Disease (CKD):     Stage 1 with normal or high GFR (GFR > 90 mL/min/1.73 square meters)    Stage 2 Mild CKD (GFR = 60-89 mL/min/1.73 square meters)    Stage 3A Moderate CKD (GFR = 45-59 mL/min/1.73 square meters)    Stage 3B Moderate CKD (GFR = 30-44 mL/min/1.73 square meters)    Stage 4 Severe CKD (GFR = 15-29 mL/min/1.73 square meters)    Stage 5 End Stage CKD (GFR <15 mL/min/1.73 square meters)  Note: GFR calculation is accurate only with a steady state creatinine    Magnesium [[id number]]  (Abnormal) Collected: 03/25/24 0300    Lab Status: Final result Specimen: Blood from Arm, Left Updated: 03/25/24 0320     Magnesium 1.2 mg/dL     Phosphorus [[id number]]  (Abnormal) Collected: 03/25/24 0300    Lab  Status: Final result Specimen: Blood from Arm, Left Updated: 03/25/24 0320     Phosphorus 2.0 mg/dL     Ethanol [[id number]]  (Abnormal) Collected: 03/25/24 0300    Lab Status: Final result Specimen: Blood from Arm, Left Updated: 03/25/24 0320     Ethanol Lvl 373 mg/dL     Fingerstick Glucose (POCT) [[id number]]  (Normal) Collected: 03/25/24 0250    Lab Status: Final result Specimen: Blood Updated: 03/25/24 0251     POC Glucose 113 mg/dl                    CT chest abdomen pelvis w contrast   Final Result by Ingrid Walsh MD (03/25 0612)      No evidence of acute intrathoracic, intra-abdominal or intrapelvic parenchymal organ injury. No pneumothorax.      There are healing fractures of the right 10th and 11th ribs. Although these are incompletely healed at the present time, there is slightly more callus formation when compared with February 10, 2024.      Diffuse hepatic steatosis.      Bilateral adrenal nodules appear unchanged compared with February 10, 2024. If not already performed, consider biochemical evaluation.      Atherosclerosis. Coronary artery disease. Prior CABG.      Other nonemergent findings as above.               Workstation performed: [id number]         CT head without contrast   Final Result by Ingrid Walsh MD (03/25 0440)      No acute intracranial abnormality.                  Workstation performed: [id number]                    Procedures  ECG 12 Lead Documentation Only    Date/Time: 3/25/2024 2:52 AM    Performed by: Seven Severino MD  Authorized by: Seven Severino MD    Indications / Diagnosis:  Alcohol intoxication  ECG reviewed by me, the ED Provider: yes    Patient location:  ED  Previous ECG:     Previous ECG:  Unavailable    Comparison to cardiac monitor: No    Interpretation:     Interpretation: normal    Rate:     ECG rate assessment: normal    Rhythm:     Rhythm: sinus rhythm    Ectopy:     Ectopy: none    QRS:     QRS axis:  Normal    QRS intervals:  Normal  Conduction:      Conduction: normal    ST segments:     ST segments:  Normal  T waves:     T waves: normal             ED Course  ED Course as of 03/25/24 0659   Mon Mar 25, 2024   0257 Blood Pressure(!): 68/47  Patient alert and oriented with this pressure. Will give 1 L NS bolus and reasses   0313 Hemoglobin: 13.1  WNL   0407 ETHANOL(!): 373  elevated   0407 Creatinine(!): 1.92  Elevated from baseline   0407 Sodium(!): 130  Low, patient has had a history of this in the past. This is likely 2/2 to alcohol use   0429 Blood Pressure: 111/55  Improved after 2 fluid boluses 2 L total.   0544 Urine Microscopic(!)  Not consistent with UTI                               SBIRT 20yo+      Flowsheet Row Most Recent Value   Initial Alcohol Screen: US AUDIT-C     1. How often do you have a drink containing alcohol? 6 Filed at: 03/25/2024 0259   2. How many drinks containing alcohol do you have on a typical day you are drinking?  6 Filed at: 03/25/2024 0259   3a. Male UNDER 65: How often do you have five or more drinks on one occasion? 6 Filed at: 03/25/2024 0259   Audit-C Score 18 Filed at: 03/25/2024 0259   Full Alcohol Screen: US AUDIT    4. How often during the last year have you found that you were not able to stop drinking once you had started? 4 Filed at: 03/25/2024 0259   5. How often during past year have you failed to do what was normally expected of you because of drinking?  4 Filed at: 03/25/2024 0259   6. How often in past year have you needed a first drink in the morning to get yourself going after a heavy drinking session?  4 Filed at: 03/25/2024 0259   7. How often in past year have you had feeling of guilt or remorse after drinking?  4 Filed at: 03/25/2024 0259   8. How often in past year have you been unable to remember what happened night before because you had been drinking?  4 Filed at: 03/25/2024 0259   9. Have you or someone else been injured as a result of your drinking?  4 Filed at: 03/25/2024 0259   10. Has a relative,  friend, doctor or other health worker been concerned about your drinking and suggested you cut down?  4 Filed at: 03/25/2024 0259   AUDIT Total Score 46 Filed at: 03/25/2024 0259   BRIGIDA: How many times in the past year have you...    Used an illegal drug or used a prescription medication for non-medical reasons? Never Filed at: 03/25/2024 0259                      Medical Decision Making  63-year-old male presenting to ED today with alpha intoxication.  On initial evaluation patient was hypotensive.  Given his Eliquis use concern for possible intra-abdominal bleeding.  Will give him normal saline bolus to start.  He is agreeable to detox however I need to make sure that his patient pressure is improved and that there is no cause of bleeding that could be causing to have hypotensive especially in the setting of multiple falls.  Will do CT head without contrast as well as CT chest abdomen and pelvis with IV contrast to evaluate for possible cause of bleeding as well as the CT head to evaluate for intracranial hemorrhage or skull fracture given the multiple falls.  Lab workup to include CMP for electrolyte abnormalities, mag and Phos.  Will also check a CBC to evaluate for acute blood loss anemia which could be causing him to have hypotension.  Will check an ethanol level as well.    Amount and/or Complexity of Data Reviewed  Labs: ordered. Decision-making details documented in ED Course.  Radiology: ordered.    Risk  Prescription drug management.             Disposition  Final diagnoses:   Alcohol intoxication (HCC)   Alcohol abuse   ENMA (acute kidney injury) (HCC)     Time reflects when diagnosis was documented in both MDM as applicable and the Disposition within this note       Time User Action Codes Description Comment    3/25/2024  6:31 AM Seven Severino Add [F10.929] Alcohol intoxication (HCC)     3/25/2024  6:31 AM Seven Severino Add [F10.10] Alcohol abuse     3/25/2024  6:31 AM Seven Severino Add [N17.9] ENMA  (acute kidney injury) (HCC)           ED Disposition       ED Disposition   Transfer to Another Facility-In Network    Condition   --    Date/Time   Mon Mar 25, 2024 0631    Comment   Gregg Partida should be transferred out to [facility] Detox unit.               MD Documentation      Flowsheet Row Most Recent Value   Patient Condition The patient has been stabilized such that within reasonable medical probability, no material deterioration of the patient condition or the condition of the unborn child(sami) is likely to result from the transfer   Reason for Transfer Level of Care needed not available at this facility   Benefits of Transfer Specialized equipment and/or services available at the receiving facility (Include comment)________________________  [Medical Toxicology]   Accepting Physician Dr. Jens Pearce   Accepting Facility Name, [facility]   Sending MD Dr. Seven Severino   Provider Certification General risk, such as traffic hazards, adverse weather conditions, rough terrain or turbulence, possible failure of equipment (including vehicle or aircraft), or consequences of actions of persons outside the control of the transport personnel          RN Documentation      Flowsheet Row Most Recent Value   Accepting Facility Name, [facility]   Medications Reviewed with Next Provider of Service Yes   Transport Mode Ambulance   Level of Care Basic life support          Follow-up Information    None         Patient's Medications   Discharge Prescriptions    No medications on file       No discharge procedures on file.    PDMP Review         Value Time User    PDMP Reviewed  Yes 1/24/2024  9:23 AM Tyrone Freire DO            ED Provider  Electronically Signed by             Seven Severino MD  03/25/24 0698

## 2024-03-26 VITALS
DIASTOLIC BLOOD PRESSURE: 74 MMHG | OXYGEN SATURATION: 97 % | SYSTOLIC BLOOD PRESSURE: 123 MMHG | TEMPERATURE: 100 F | HEART RATE: 88 BPM | RESPIRATION RATE: 28 BRPM

## 2024-03-26 PROBLEM — N17.9 ACUTE KIDNEY INJURY SUPERIMPOSED ON CKD  (HCC): Status: RESOLVED | Noted: 2019-06-07 | Resolved: 2024-03-26

## 2024-03-26 PROBLEM — I95.9 HYPOTENSION: Status: RESOLVED | Noted: 2024-03-25 | Resolved: 2024-03-26

## 2024-03-26 PROBLEM — N18.9 ACUTE KIDNEY INJURY SUPERIMPOSED ON CKD  (HCC): Status: RESOLVED | Noted: 2019-06-07 | Resolved: 2024-03-26

## 2024-03-26 PROBLEM — E83.42 HYPOMAGNESEMIA: Status: RESOLVED | Noted: 2018-10-10 | Resolved: 2024-03-26

## 2024-03-26 LAB
ANION GAP SERPL CALCULATED.3IONS-SCNC: 7 MMOL/L (ref 4–13)
BASOPHILS # BLD AUTO: 0.05 THOUSANDS/ÂΜL (ref 0–0.1)
BASOPHILS NFR BLD AUTO: 1 % (ref 0–1)
BUN SERPL-MCNC: 21 MG/DL (ref 5–25)
CALCIUM SERPL-MCNC: 8.1 MG/DL (ref 8.4–10.2)
CHLORIDE SERPL-SCNC: 100 MMOL/L (ref 96–108)
CO2 SERPL-SCNC: 26 MMOL/L (ref 21–32)
CREAT SERPL-MCNC: 1.15 MG/DL (ref 0.6–1.3)
EOSINOPHIL # BLD AUTO: 0.17 THOUSAND/ÂΜL (ref 0–0.61)
EOSINOPHIL NFR BLD AUTO: 4 % (ref 0–6)
ERYTHROCYTE [DISTWIDTH] IN BLOOD BY AUTOMATED COUNT: 15.1 % (ref 11.6–15.1)
GFR SERPL CREATININE-BSD FRML MDRD: 68 ML/MIN/1.73SQ M
GLUCOSE SERPL-MCNC: 110 MG/DL (ref 65–140)
GLUCOSE SERPL-MCNC: 87 MG/DL (ref 65–140)
GLUCOSE SERPL-MCNC: 95 MG/DL (ref 65–140)
HCT VFR BLD AUTO: 33.6 % (ref 36.5–49.3)
HGB BLD-MCNC: 11.3 G/DL (ref 12–17)
IMM GRANULOCYTES # BLD AUTO: 0.02 THOUSAND/UL (ref 0–0.2)
IMM GRANULOCYTES NFR BLD AUTO: 0 % (ref 0–2)
LYMPHOCYTES # BLD AUTO: 1.07 THOUSANDS/ÂΜL (ref 0.6–4.47)
LYMPHOCYTES NFR BLD AUTO: 23 % (ref 14–44)
MCH RBC QN AUTO: 32.7 PG (ref 26.8–34.3)
MCHC RBC AUTO-ENTMCNC: 33.6 G/DL (ref 31.4–37.4)
MCV RBC AUTO: 97 FL (ref 82–98)
MONOCYTES # BLD AUTO: 0.42 THOUSAND/ÂΜL (ref 0.17–1.22)
MONOCYTES NFR BLD AUTO: 9 % (ref 4–12)
NEUTROPHILS # BLD AUTO: 2.98 THOUSANDS/ÂΜL (ref 1.85–7.62)
NEUTS SEG NFR BLD AUTO: 63 % (ref 43–75)
NRBC BLD AUTO-RTO: 0 /100 WBCS
PLATELET # BLD AUTO: 44 THOUSANDS/UL (ref 149–390)
PMV BLD AUTO: 12.3 FL (ref 8.9–12.7)
POTASSIUM SERPL-SCNC: 3.9 MMOL/L (ref 3.5–5.3)
PROCALCITONIN SERPL-MCNC: 0.14 NG/ML
RBC # BLD AUTO: 3.46 MILLION/UL (ref 3.88–5.62)
SODIUM SERPL-SCNC: 133 MMOL/L (ref 135–147)
WBC # BLD AUTO: 4.71 THOUSAND/UL (ref 4.31–10.16)

## 2024-03-26 PROCEDURE — 85025 COMPLETE CBC W/AUTO DIFF WBC: CPT

## 2024-03-26 PROCEDURE — 82948 REAGENT STRIP/BLOOD GLUCOSE: CPT

## 2024-03-26 PROCEDURE — 93005 ELECTROCARDIOGRAM TRACING: CPT

## 2024-03-26 PROCEDURE — 99232 SBSQ HOSP IP/OBS MODERATE 35: CPT | Performed by: INTERNAL MEDICINE

## 2024-03-26 PROCEDURE — 80048 BASIC METABOLIC PNL TOTAL CA: CPT

## 2024-03-26 PROCEDURE — 84145 PROCALCITONIN (PCT): CPT

## 2024-03-26 RX ORDER — METOPROLOL TARTRATE 50 MG/1
50 TABLET, FILM COATED ORAL EVERY 12 HOURS SCHEDULED
Status: DISCONTINUED | OUTPATIENT
Start: 2024-03-26 | End: 2024-03-26 | Stop reason: HOSPADM

## 2024-03-26 RX ORDER — ACETAMINOPHEN 325 MG/1
650 TABLET ORAL EVERY 6 HOURS PRN
Status: DISCONTINUED | OUTPATIENT
Start: 2024-03-26 | End: 2024-03-26 | Stop reason: HOSPADM

## 2024-03-26 RX ORDER — POTASSIUM CHLORIDE 20 MEQ/1
20 TABLET, EXTENDED RELEASE ORAL ONCE
Status: COMPLETED | OUTPATIENT
Start: 2024-03-26 | End: 2024-03-26

## 2024-03-26 RX ORDER — LISINOPRIL 10 MG/1
10 TABLET ORAL DAILY
Status: DISCONTINUED | OUTPATIENT
Start: 2024-03-26 | End: 2024-03-26 | Stop reason: HOSPADM

## 2024-03-26 RX ORDER — GABAPENTIN 300 MG/1
300 CAPSULE ORAL 3 TIMES DAILY
Status: DISCONTINUED | OUTPATIENT
Start: 2024-03-26 | End: 2024-03-26 | Stop reason: HOSPADM

## 2024-03-26 RX ADMIN — METOPROLOL TARTRATE 50 MG: 50 TABLET, FILM COATED ORAL at 08:15

## 2024-03-26 RX ADMIN — POTASSIUM CHLORIDE 20 MEQ: 1500 TABLET, EXTENDED RELEASE ORAL at 10:40

## 2024-03-26 RX ADMIN — APIXABAN 5 MG: 5 TABLET, FILM COATED ORAL at 08:15

## 2024-03-26 RX ADMIN — LISINOPRIL 10 MG: 10 TABLET ORAL at 10:40

## 2024-03-26 RX ADMIN — THIAMINE HCL TAB 100 MG 100 MG: 100 TAB at 08:15

## 2024-03-26 RX ADMIN — TAMSULOSIN HYDROCHLORIDE 0.4 MG: 0.4 CAPSULE ORAL at 08:15

## 2024-03-26 RX ADMIN — ACETAMINOPHEN 325MG 650 MG: 325 TABLET ORAL at 00:18

## 2024-03-26 RX ADMIN — ASPIRIN 81 MG: 81 TABLET, COATED ORAL at 08:15

## 2024-03-26 RX ADMIN — FLUTICASONE FUROATE AND VILANTEROL TRIFENATATE 1 PUFF: 200; 25 POWDER RESPIRATORY (INHALATION) at 08:16

## 2024-03-26 RX ADMIN — GABAPENTIN 300 MG: 300 CAPSULE ORAL at 08:15

## 2024-03-26 RX ADMIN — FOLIC ACID 1000 MCG: 1 TABLET ORAL at 08:15

## 2024-03-26 RX ADMIN — RANOLAZINE 500 MG: 500 TABLET, EXTENDED RELEASE ORAL at 04:57

## 2024-03-26 NOTE — UTILIZATION REVIEW
Initial Clinical Review    TRANSFER  FROM  [facility]    Admission: Date/Time/Statement:   Admission Orders (From admission, onward)       Ordered        03/25/24 1346  Inpatient Admission  Once                          Orders Placed This Encounter   Procedures    Inpatient Admission     Standing Status:   Standing     Number of Occurrences:   1     Order Specific Question:   Level of Care     Answer:   Level 1 Stepdown [13]     Order Specific Question:   Estimated length of stay     Answer:   More than 2 Midnights     Order Specific Question:   Certification     Answer:   I certify that inpatient services are medically necessary for this patient for a duration of greater than two midnights. See H&P and MD Progress Notes for additional information about the patient's course of treatment.       Initial Presentation: 63 y.o. male initially presented to  [place]  ED after calling  EMS  and  wanted to check out after drinking.  Intoxicated on  ED arrival, oriented  X 3, speech slurred.  Received phenobarb  for  alcohol withdrawal, became hypotensive in ED.   Drinks  a quart of vodka daily.   Transferred  To [facility] for further care.  Multiple past hospitalizations  for  alcohol intoxication.  Additional PMH  is  COPD, CAD, prostate cancer,  DM 2, CKD,   S/P bypass  and Afib on  Eliquis.  BP   68/47 S/P 2 L IVF  and  IV  albumin in ED.  Mg   1.2, likely  due to malnourishment and  replaced. Admit  Ip ICU LOC  with  Hypotension, Alcohol abuse, ENMA and plan is  monitor  labs,  CIWA  scores, IVF, hold home antihypertensives, MVI  and replace electrolytes as needed.          Date:   3/26   Day 2:   D.C    home    ED Triage Vitals   Temperature Pulse Respirations Blood Pressure SpO2   03/25/24 1533 03/25/24 1347 03/25/24 1347 03/25/24 1347 03/25/24 1347   98.1 °F (36.7 °C) 94 19 135/72 97 %      Temp Source Heart Rate Source Patient Position - Orthostatic VS BP Location FiO2 (%)   03/25/24 1533 03/25/24 2000 03/25/24 2000  03/25/24 2000 --   Axillary Monitor Lying Left arm       Pain Score       03/25/24 1347       No Pain          Wt Readings from Last 1 Encounters:   03/25/24 87.7 kg (193 lb 5.5 oz)       CIWA-Ar Total -- -- -- -- 4  -CN   Row Name 03/25/24 2330 03/25/24 2229 03/25/24 2200 03/25/24 2100 03/25/24 2023     CIWA-Ar Total 0  -JL -- -- 3  -JL       Additional Vital Signs:   03/26/24 0430 -- 90 20 188/107 Abnormal  131 97 % -- -- -- --   03/26/24 0400 98.5 °F (36.9 °C) 80 -- 163/82 110 100 % -- -- -- --   03/26/24 0330 -- 68 -- 122/62 93 95 % -- -- -- --   03/26/24 0300 -- 68 -- 106/57 72 100 % -- -- -- --   03/26/24 0230 -- 72 18 91/45 Abnormal  61 Abnormal  99 % -- -- -- --   03/26/24 0200 -- 80 15 96/49 Abnormal  73 99 % -- -- -- --   03/26/24 0100 -- 102 33 Abnormal  123/75 97 97 % -- -- -- --   03/26/24 0018 -- 106 Abnormal  -- 131/74 92 97 % -- -- -- --   03/26/24 0000 -- 102 -- 133/85 109 97 % -- -- -- --   03/25/24 2353 98.8 °F (37.1 °C) 104 -- 115/54 -- 97 % 28 2 L/min Nasal cannula --   03/25/24 2330 -- 98 18 115/54 70 96 % -- -- -- --   03/25/24 2229 -- 88 23 Abnormal  99/75 -- 99 % -- -- -- --   03/25/24 2200 -- 76 -- 84/46 Abnormal  -- -- -- -- -- --   03/25/24 2100 -- 80 15 120/63 78 98 % -- -- -- --   03/25/24 2023 -- 84 29 Abnormal  97/59 70 96 % -- -- -- --   03/25/24 2000 98.3 °F (36.8 °C) 92 16 123/75  -- 93 % 28 2 L/min Nasal cannula Lying   BP: incorrect entry at 03/25/24 2000 03/25/24 1800 -- 106 Abnormal  -- -- -- -- -- -- -- --   03/25/24 1715 -- 86 15 108/66 88 95 % -- -- -- --   03/25/24 1645 -- 86 20 99/59 72 95 % -- -- -- --   03/25/24 1615 -- 84 23 Abnormal  88/48 Abnormal  70 96 % -- -- -- --   03/25/24 1533 98.1 °F (36.7 °C) 100 20 107/61 87 92 % -- -- -- --   03/25/24 1503 -- 86 14 90/54 62 Abnormal  92 % -- -- -- --   03/25/24 1347 -- 94 19 135/72 87 97 % --        Pertinent Labs/Diagnostic Test Results:     EKG  ST  Results from last 7 days   Lab Units 03/26/24  5676 03/25/24  9689  03/25/24  1127 03/25/24  0300   WBC Thousand/uL 4.71  --  4.96 6.47   HEMOGLOBIN g/dL 11.3* 10.2* 10.9* 13.1   HEMATOCRIT % 33.6*  --  31.9* 38.5   PLATELETS Thousands/uL 44*  --  46* 63*   NEUTROS ABS Thousands/µL 2.98  --   --   --    BANDS PCT %  --   --   --  2         Results from last 7 days   Lab Units 03/26/24  0506 03/25/24  1127 03/25/24  1032 03/25/24  0300   SODIUM mmol/L 133* 135 133* 130*   POTASSIUM mmol/L 3.9 3.5 3.6 4.0   CHLORIDE mmol/L 100 99 97 94*   CO2 mmol/L 26 22 22 20*   ANION GAP mmol/L 7 14* 14* 16*   BUN mg/dL 21 23 24 22   CREATININE mg/dL 1.15 1.67* 1.69* 1.92*   EGFR ml/min/1.73sq m 68 43 42 36   CALCIUM mg/dL 8.1* 7.6* 7.8* 8.5   MAGNESIUM mg/dL  --  1.9 1.8* 1.2*   PHOSPHORUS mg/dL  --   --   --  2.0*     Results from last 7 days   Lab Units 03/25/24  1127 03/25/24  1032 03/25/24  0300   AST U/L 88* 89* 97*   ALT U/L 47 50 56*   ALK PHOS U/L 67 71 84   TOTAL PROTEIN g/dL 6.0* 6.3* 7.0   ALBUMIN g/dL 3.6 3.8 3.8   TOTAL BILIRUBIN mg/dL 0.82 0.90 1.02*     Results from last 7 days   Lab Units 03/26/24  0702 03/25/24  2128 03/25/24  0250   POC GLUCOSE mg/dl 110 107 113     Results from last 7 days   Lab Units 03/26/24  0506 03/25/24  1127 03/25/24  1032 03/25/24  0300   GLUCOSE RANDOM mg/dL 87 84 112 97                   Results from last 7 days   Lab Units 03/25/24  1206   HS TNI 0HR ng/L 54*                 Results from last 7 days   Lab Units 03/26/24  0506 03/25/24  1206   PROCALCITONIN ng/ml 0.14 0.35*     Results from last 7 days   Lab Units 03/25/24  1439 03/25/24  1206   LACTIC ACID mmol/L 1.5 2.4*               Results from last 7 days   Lab Units 03/25/24  0511   CLARITY UA  Slightly Cloudy   COLOR UA  Yellow   SPEC GRAV UA  <=1.005   PH UA  6.0   GLUCOSE UA mg/dl Negative   KETONES UA mg/dl Negative   BLOOD UA  Trace-Intact*   PROTEIN UA mg/dl 100 (2+)*   NITRITE UA  Positive*   BILIRUBIN UA  Small*   UROBILINOGEN UA E.U./dl 0.2   LEUKOCYTES UA  Negative   WBC UA /hpf 1-2    RBC UA /hpf 0-1   BACTERIA UA /hpf Occasional   EPITHELIAL CELLS WET PREP /hpf Occasional             Results from last 7 days   Lab Units 03/25/24  0511   AMPH/METH  Negative   BARBITURATE UR  Negative   BENZODIAZEPINE UR  Negative   COCAINE UR  Negative   METHADONE URINE  Negative   OPIATE UR  Negative   PCP UR  Negative   THC UR  Negative     Results from last 7 days   Lab Units 03/25/24  0300   ETHANOL LVL mg/dL 373*                 Results from last 7 days   Lab Units 03/25/24  1207   BLOOD CULTURE  Received in Microbiology Lab. Culture in Progress.  Received in Microbiology Lab. Culture in Progress.                     Present on Admission:   Hypomagnesemia   Acute kidney injury superimposed on CKD    A-fib ([facility])   COPD (chronic obstructive pulmonary disease) ([facility])   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 disease ([facility])   Hypertension      Admitting Diagnosis: Alcohol abuse with withdrawal delirium ([facility]) [F10.131]  Age/Sex: 63 y.o. male  Admission Orders:  Scheduled Medications:  apixaban, 5 mg, Oral, BID  aspirin, 81 mg, Oral, Daily  fluticasone-vilanterol, 1 puff, Inhalation, Daily  folic acid, 1,000 mcg, Oral, Daily  gabapentin, 300 mg, Oral, TID  lisinopril, 10 mg, Oral, Daily  metoprolol tartrate, 50 mg, Oral, Q12H SEDA  potassium chloride, 20 mEq, Oral, Once  pravastatin, 40 mg, Oral, Daily With Dinner  ranolazine, 500 mg, Oral, Q12H  tamsulosin, 0.4 mg, Oral, Daily  vitamin B-1, 100 mg, Oral, Daily      Continuous IV Infusions:  IVF    125/hr - d/c    3/26  Protonix d rip - d/c   3/25     PRN Meds:  acetaminophen, 650 mg, Oral, Q6H PRN  albuterol, 2 puff, Inhalation, Q4H PRN  aluminum-magnesium hydroxide-simethicone, 30 mL, Oral, Q4H PRN  ondansetron, 4 mg, Intravenous, Q6H PRN  senna-docusate sodium, 1 tablet, Oral, Daily PRN        IP CONSULT TO CASE MANAGEMENT  IP CONSULT TO CASE MANAGEMENT    Network Utilization Review Department  ATTENTION: Please call with any questions or concerns  to [phone] and carefully listen to the prompts so that you are directed to the right person. All voicemails are confidential.   For Discharge needs, contact Care Management DC Support Team at [phone] opt. [phone]  Send all requests for admission clinical reviews, approved or denied determinations and any other requests to dedicated fax number below belonging to the campus where the patient is receiving treatment. List of dedicated fax numbers for the Facilities:  FACILITY NAME UR FAX NUMBER   ADMISSION DENIALS (Administrative/Medical Necessity) [phone]   DISCHARGE SUPPORT TEAM (NETWORK) [phone]   PARENT CHILD HEALTH (Maternity/NICU/Pediatrics) [phone]   [facility] [phone]   [facility] [phone]   [facility] [phone]   [facility] [phone]   [facility] [phone]   [facility] [phone]   [facility] [phone]   [facility] [phone]   [facility] [phone]   [facility] [phone]   [facility] [phone]   [facility] [phone]

## 2024-03-26 NOTE — ASSESSMENT & PLAN NOTE
Patient hypotensive in the ED. : BP 68 /47 ( MAP 54 mmHg)  S/P 2 L IV normal saline and albumin 12.5.   MAP increased, stable over 65.   Home medication lisinopril 10 mg daily, Lopressor 50 mg BID.   Differential dg include dehydration, medication toxicity due to mixing doses, septic shock less likely due to lacking WBC elevation, fever.   No signs of active bleeding, his Hb remains stable.   RESOLVED   Plan:   - Discontinue MIVF @125ml/ hr.

## 2024-03-26 NOTE — ASSESSMENT & PLAN NOTE
Multiple hospitalizations for alcohol withdrawal.   Drinks quart on vodka a day .   Naltrexone in the past.   Phenobarbital 650 mg given in the ED.   CIWA score < 7 through stay     Patient not interested in inpatient rehab.

## 2024-03-26 NOTE — DISCHARGE SUMMARY
[facility]  Discharge- Gregg Partida 1960, 63 y.o. male MRN: [id number]  Unit/Bed#: ICU 08 Encounter: [id number]  Primary Care Provider: Korin Lo MD   Date and time admitted to hospital: 3/25/2024  1:41 PM    * Hypotension-resolved as of 3/26/2024  Assessment & Plan  Patient hypotensive in the ED. : BP 68 /47 ( MAP 54 mmHg)  S/P 2 L IV normal saline and albumin 12.5.   MAP increased, stable over 65.   Home medication lisinopril 10 mg daily, Lopressor 50 mg BID.   Differential dg include dehydration, medication toxicity due to mixing doses, septic shock less likely due to lacking WBC elevation, fever.   No signs of active bleeding, his Hb remains stable.   RESOLVED   Plan:   - Discontinue MIVF @125ml/ hr.       Alcohol abuse  Assessment & Plan  Multiple hospitalizations for alcohol withdrawal.   Drinks quart on vodka a day .   Naltrexone in the past.   Phenobarbital 650 mg given in the ED.   CIWA score < 7 through stay     Patient not interested in inpatient rehab.      Acute kidney injury superimposed on CKD -resolved as of 3/26/2024  Assessment & Plan  Lab Results   Component Value Date    EGFR 68 03/26/2024    EGFR 43 03/25/2024    EGFR 42 03/25/2024    CREATININE 1.15 03/26/2024    CREATININE 1.67 (H) 03/25/2024    CREATININE 1.69 (H) 03/25/2024     Baseline Creatinine is between 1 to 1.3.   Elevated likely prerenal due to dehydration and alcohol abuse.   Patient has had elevated creatinine levels in prior hospitalizations as well.   RESOLVED       Hypertension  Assessment & Plan  BP Readings from Last 3 Encounters:   03/26/24 123/74   03/25/24 104/55   03/09/24 160/90    Home medication Lisinopril 10 mg daily and Lopressor 50 mg BID   - Continue     A-fib (HCC)  Assessment & Plan  EKG today shows sinus tachycardia.   Rate controlled with Lopressor 50 mg BID  Eliquis 5 mg BID     Plan:   -Continue Eliquis   - restart Lopressor       COPD (chronic obstructive  pulmonary disease) (HCC)  Assessment & Plan  Hx of COPD, Smokes 1 pack a day per chart review.   Breo Ellipta and albuterol PRN home med     Plan:   - Continue home inhalers,     Hx of CABG  Assessment & Plan  Bypass surgery in 2004,   Home med : pravastatin, aspirin.   - continue     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 disease (HCC)  Assessment & Plan  Lab Results   Component Value Date    HGBA1C 5.4 01/24/2024       Recent Labs     03/25/24  0250 03/25/24  2128 03/26/24  0702   POCGLU 113 107 110         Blood Sugar Average: Last 72 hrs:  (P) 108.5  Home medication Metformin 500 mg daily.   Glucose normal.    Restart home meds        Hypomagnesemia-resolved as of 3/26/2024  Assessment & Plan  On admission 1.2 likely malnourishment in setting of alcohol abuse.   Repleted with 4 gr IV.   S/p repletion Hypomagnesemia has RESOLVED .       History of prostate cancer  Assessment & Plan  Prostate cancer in 2020, s/p resection in 2021  - continue home med flomax.                Medical Problems       Resolved Problems  Date Reviewed: 3/26/2024            Resolved    Hypomagnesemia 3/26/2024     Resolved by  Aylin Wood MD    Acute kidney injury superimposed on CKD  3/26/2024     Resolved by  Aylin Wood MD    * (Principal) Hypotension 3/26/2024     Resolved by  Aylin Wood MD          Admission Date:   Admission Orders (From admission, onward)       Ordered        03/25/24 1346  Inpatient Admission  Once                            Admitting Diagnosis: Alcohol abuse with withdrawal delirium (HCC) [F10.131]    HPI: 64 yo male with hx of alcohol use, A-fib, CAD with hx of bypass in 2004, CKD, COPD, HTN reports to have been drinking for a week, mostly Vodka. Patient reports falling in the bathroom while intoxicated and called EMS. Patient was hypotensive in the ED, was given 650 mg of phenobarbital and was transferred to [place] ICU for possible need of vasopressors in the setting of  hypotension       Procedures Performed: No orders of the defined types were placed in this encounter.      Summary of Hospital Course: Patient found to be hypotensive, ENMA in the ED, resuscitated with IV fluids, Phenobarbital given in the ED for alcohol intoxication. Patient admitted to ICU for possible need of vasopressors. He was able to maintain BP without need of pressors.  Patient was given IV fluids, repleted electrolytes. ENMA and hypotension resolved. Detox made aware.     Significant Findings, Care, Treatment and Services Provided:  Elevated creatinine, ETOH, low magnesium. Imaging shows no acute abnormalities     Complications: none    Condition at Discharge: stable         Discharge instructions/Information to patient and family:   See after visit summary for information provided to patient and family.      Provisions for Follow-Up Care:  See after visit summary for information related to follow-up care and any pertinent home health orders.      PCP: Korin Lo MD    Disposition: Home    Planned Readmission: No    Discharge Statement   I spent 30 minutes discharging the patient. This time was spent on the day of discharge. I had direct contact with the patient on the day of discharge. Additional documentation is required if more than 30 minutes were spent on discharge.     Discharge Medications:  See after visit summary for reconciled discharge medications provided to patient and family.

## 2024-03-26 NOTE — ASSESSMENT & PLAN NOTE
Lab Results   Component Value Date    HGBA1C 5.4 01/24/2024       Recent Labs     03/25/24  0250 03/25/24 2128 03/26/24  0702   POCGLU 113 107 110         Blood Sugar Average: Last 72 hrs:  (P) 108.5  Home medication Metformin 500 mg daily.   Glucose normal.    Restart home meds

## 2024-03-26 NOTE — ASSESSMENT & PLAN NOTE
BP Readings from Last 3 Encounters:   03/26/24 123/74   03/25/24 104/55   03/09/24 160/90    Home medication Lisinopril 10 mg daily and Lopressor 50 mg BID   - Continue

## 2024-03-26 NOTE — PROGRESS NOTES
[facility]  Progress Note  Name: Gregg Partida I  MRN: [id number]  Unit/Bed#: ICU 08 I Date of Admission: 3/25/2024   Date of Service: 3/26/2024 I Hospital Day: 1    Assessment/Plan   * Hypotension  Assessment & Plan  Patient hypotensive in the ED. : BP 68 /47 ( MAP 54 mmHg)  S/P 2 L IV normal saline and albumin 12.5.   MAP increased, stable over 65.   Home medication lisinopril 10 mg daily, Lopressor 50 mg BID.   Differential dg include dehydration, medication toxicity due to mixing doses, septic shock less likely due to lacking WBC elevation, fever.   No signs of active bleeding, his Hb remains stable.   RESOLVED   Plan:   - Discontinue MIVF @125ml/ hr.   - Consider restarting home medication     Alcohol abuse  Assessment & Plan  Multiple hospitalizations for alcohol withdrawal.   Drinks quart on vodka a day .   Naltrexone in the past.     Given Phenobarbital in the ED.    Plan:   - [facility] protocol  - folate and thiamine supplementation      Acute kidney injury superimposed on CKD   Assessment & Plan  Lab Results   Component Value Date    EGFR 68 03/26/2024    EGFR 43 03/25/2024    EGFR 42 03/25/2024    CREATININE 1.15 03/26/2024    CREATININE 1.67 (H) 03/25/2024    CREATININE 1.69 (H) 03/25/2024     Baseline Creatinine is between 1 to 1.3.   Elevated likely prerenal due to dehydration and alcohol abuse.   Patient has had elevated creatinine levels in prior hospitalizations as well.   RESOLVED       Hypertension  Assessment & Plan  BP Readings from Last 3 Encounters:   03/26/24 169/98   03/25/24 104/55   03/09/24 160/90    Home medication Lisinopril 10 mg daily and Lopressor 50 mg BID     Plan:   - Consider restarting Lisinopril now that Creatinine is back to baseline     A-fib (HCC)  Assessment & Plan  EKG today shows sinus tachycardia.   Rate controlled with Lopressor 50 mg BID  Eliquis 5 mg BID     Plan:   -Continue Eliquis   - restart Lopressor       COPD (chronic obstructive  pulmonary disease) ([facility])  Assessment & Plan  Hx of COPD, Smokes 1 pack a day per chart review.   Breo Ellipta and albuterol PRN home med     Plan:   - Continue home inhalers,     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 disease (HCC)  Assessment & Plan  Lab Results   Component Value Date    HGBA1C 5.4 01/24/2024       Recent Labs     03/25/24  0250 03/25/24  2128 03/26/24  0702   POCGLU 113 107 110       Blood Sugar Average: Last 72 hrs:  (P) 108.5  Home medication Metformin 500 mg daily.   Glucose normal.    Plan:   - Hold metformin   - ACHS checks        Hypomagnesemia  Assessment & Plan  On admission 1.2 likely malnourishment in setting of alcohol abuse.   Repleted with 4 gr IV.   S/p repletion Hypomagnesemia has RESOLVED .            Disposition: Critical care    ICU Core Measures     A: Assess, Prevent, and Manage Pain Has pain been assessed? Yes  Need for changes to pain regimen? Yes   B: Both SAT/SAT  N/A   C: Choice of Sedation RASS Goal: 0 Alert and Calm  Need for changes to sedation or analgesia regimen? No   D: Delirium CAM-ICU: Negative   E: Early Mobility  Plan for early mobility? Yes   F: Family Engagement Plan for family engagement today? NA         Prophylaxis:  VTE VTE covered by:  apixaban, Oral, 5 mg at 03/25/24 1718       Stress Ulcer  not orderedcovered bypantoprazole (PROTONIX) 40 mg tablet [[id number]] (Long-Term Med)         Significant 24hr Events     24hr events: Hypotension resolved overnight, Patient now alert and oriented, he is complaining headache and overall generalized pain related to his fall yesterday. CIWA score 5 with no additional intervention.       Subjective   Review of Systems   Constitutional:  Negative for appetite change and diaphoresis.   HENT:  Negative for trouble swallowing.    Eyes:  Negative for redness and visual disturbance.   Respiratory:  Positive for cough. Negative for shortness of breath and wheezing.    Gastrointestinal:  Negative for abdominal  distention, abdominal pain, blood in stool and nausea.   Musculoskeletal:  Positive for gait problem.   Skin:         Bruises in the forearms    Neurological:  Positive for headaches. Negative for seizures and syncope.      Objective                            Vitals I/O      Most Recent Min/Max in 24hrs   Temp 99.1 °F (37.3 °C) Temp  Min: 98.1 °F (36.7 °C)  Max: 99.1 °F (37.3 °C)   Pulse 84 Pulse  Min: 68  Max: 114   Resp 20 Resp  Min: 14  Max: 33   /98 BP  Min: 84/46  Max: 188/107   O2 Sat 99 % SpO2  Min: 92 %  Max: 100 %      Intake/Output Summary (Last 24 hours) at 3/26/2024 0801  Last data filed at 3/26/2024 0600  Gross per 24 hour   Intake 2638.75 ml   Output 1300 ml   Net 1338.75 ml       Diet Regular; Regular House; Sodium 2 GM    Invasive Monitoring   none        Physical Exam   Physical Exam  Eyes:      General: No scleral icterus.     Conjunctiva/sclera: Conjunctivae normal.   Skin:     General: Skin is warm and dry.      Coloration: Skin is not jaundiced.   HENT:      Head: Normocephalic.      Nose: Rhinorrhea present.      Mouth/Throat:      Mouth: Mucous membranes are moist.   Abdominal:      Palpations: Abdomen is soft.      Tenderness: There is no abdominal tenderness. There is no guarding.   Constitutional:       General: He is not in acute distress.     Appearance: He is not toxic-appearing.   Pulmonary:      Effort: No accessory muscle usage, respiratory distress or accessory muscle usage.      Breath sounds: Normal breath sounds.   Neurological:      Mental Status: He is oriented to person, place and time.            Diagnostic Studies      EKG: none  Imaging: none today       Medications:  Scheduled PRN   apixaban, 5 mg, BID  aspirin, 81 mg, Daily  fluticasone-vilanterol, 1 puff, Daily  folic acid, 1,000 mcg, Daily  gabapentin, 300 mg, TID  metoprolol tartrate, 50 mg, Q12H SEDA  pravastatin, 40 mg, Daily With Dinner  ranolazine, 500 mg, Q12H  tamsulosin, 0.4 mg, Daily  vitamin B-1, 100 mg,  Daily      acetaminophen, 650 mg, Q6H PRN  albuterol, 2 puff, Q4H PRN  aluminum-magnesium hydroxide-simethicone, 30 mL, Q4H PRN  ondansetron, 4 mg, Q6H PRN  senna-docusate sodium, 1 tablet, Daily PRN       Continuous          Labs:    CBC    Recent Labs     03/25/24  0300 03/25/24  1127 03/25/24  1439 03/26/24  0506   WBC 6.47 4.96  --  4.71   HGB 13.1 10.9* 10.2* 11.3*   HCT 38.5 31.9*  --  33.6*   PLT 63* 46*  --  44*   BANDSPCT 2  --   --   --      BMP    Recent Labs     03/25/24  1127 03/26/24  0506   SODIUM 135 133*   K 3.5 3.9   CL 99 100   CO2 22 26   AGAP 14* 7   BUN 23 21   CREATININE 1.67* 1.15   CALCIUM 7.6* 8.1*       Coags    No recent results     Additional Electrolytes  Recent Labs     03/25/24  0300 03/25/24  1032 03/25/24  1127   MG 1.2* 1.8* 1.9   PHOS 2.0*  --   --           Blood Gas    No recent results  No recent results LFTs  Recent Labs     03/25/24  1032 03/25/24  1127   ALT 50 47   AST 89* 88*   ALKPHOS 71 67   ALB 3.8 3.6   TBILI 0.90 0.82       Infectious  Recent Labs     03/25/24  1206 03/26/24  0506   PROCALCITONI 0.35* 0.14     Glucose  Recent Labs     03/25/24  0300 03/25/24  1032 03/25/24  1127 03/26/24  0506   GLUC 97 112 84 87               Aylin Wood MD

## 2024-03-26 NOTE — ASSESSMENT & PLAN NOTE
Lab Results   Component Value Date    HGBA1C 5.4 01/24/2024       Recent Labs     03/25/24  0250 03/25/24  2128 03/26/24  0702   POCGLU 113 107 110       Blood Sugar Average: Last 72 hrs:  (P) 108.5  Home medication Metformin 500 mg daily.   Glucose normal.    Plan:   - Hold metformin   - ACHS checks

## 2024-03-26 NOTE — UTILIZATION REVIEW
NOTIFICATION OF ADMISSION DISCHARGE   This is a Notification of Discharge from [facility]. Please be advised that this patient has been discharge from our facility. Below you will find the admission and discharge date and time including the patient’s disposition.   UTILIZATION REVIEW CONTACT:  Zuly Ball MA  Utilization   Network Utilization Review Department  Phone: [phone] x carefully listen to the prompts. All voicemails are confidential.  Email: [email or web address]     ADMISSION INFORMATION  PRESENTATION DATE: 3/25/2024  9:10 AM  OBERVATION ADMISSION DATE:   INPATIENT ADMISSION DATE: 3/25/24  9:10 AM   DISCHARGE DATE: 3/25/2024  1:00 PM   DISPOSITION:[facility] Utilization Review Department  ATTENTION: Please call with any questions or concerns to [phone] and carefully listen to the prompts so that you are directed to the right person. All voicemails are confidential.   For Discharge needs, contact Care Management DC Support Team at [phone] opt. 2  Send all requests for admission clinical reviews, approved or denied determinations and any other requests to dedicated fax number below belonging to the campus where the patient is receiving treatment. List of dedicated fax numbers for the Facilities:  FACILITY NAME UR FAX NUMBER   ADMISSION DENIALS (Administrative/Medical Necessity) [phone]   DISCHARGE SUPPORT TEAM ([facility]) [phone]   PARENT CHILD HEALTH (Maternity/NICU/Pediatrics) [phone]   [facility] [phone]   [facility] [phone]   [facility] [phone]   [facility] [phone]   [facility] [phone]   [facility] [phone]   [facility] [phone]   [facility]  [phone]   [facility] [phone]   [facility] [phone]   [facility] [phone]   [facility] [phone]

## 2024-03-26 NOTE — UTILIZATION REVIEW
NOTIFICATION OF INPATIENT MEDICAL ADMISSION   AUTHORIZATION REQUEST   SERVICING FACILITY:   [facility]  [address] [place] [address]  Tax ID: [id number]  NPI: [id number] ATTENDING PROVIDER:  Attending Name and NPI#: Jens Pearce Do [[id number]]  Address: [address] [place] [address]  Phone: [phone]     ADMISSION INFORMATION:  Place of Service: Inpatient [facility]  Place of Service Code: 21  Inpatient Admission Date/Time: 3/25/24  9:10 AM  Discharge Date/Time: 3/25/2024  1:00 PM  Admitting Diagnosis Code/Description:  Alcohol abuse     UTILIZATION REVIEW CONTACT:  Zuly Ball, Utilization   Network Utilization Review Department  Phone: [phone]  Fax [phone]  Email: [email or web address]  Contact for approvals/pending authorizations, clinical reviews, and discharge.     PHYSICIAN ADVISORY SERVICES:  Medical Necessity Denial & Bmwm-aq-Ngyt Review  Phone: [phone]  Fax: [phone]  Email: [email or web address]     DISCHARGE SUPPORT TEAM:  For Patients Discharge Needs & Updates  Phone: [phone] opt. 2 Fax: [phone]  Email: [email or web address]

## 2024-03-26 NOTE — UTILIZATION REVIEW
Initial Clinical Review    Pt initially presented to [facility] ED. Pt was transferred by EMS to [facility] for its Level IV medically managed intensive inpatient detox unit, not available at [facility].    Admission: Date/Time/Statement:   Admission Orders (From admission, onward)       Ordered        03/25/24 0939  Inpatient Admission  Once                          Orders Placed This Encounter   Procedures    Inpatient Admission     Standing Status:   Standing     Number of Occurrences:   1     Order Specific Question:   Level of Care     Answer:   Med Surg [16]     Order Specific Question:   Estimated length of stay     Answer:   More than 2 Midnights     Order Specific Question:   Certification     Answer:   I certify that inpatient services are medically necessary for this patient for a duration of greater than two midnights. See H&P and MD Progress Notes for additional information about the patient's course of treatment.       Initial Presentation: 63 y.o. male who presented to medical detox. Inpatient admission for evaluation and treatment of alcohol withdrawal syndrome. Presented w/ fall and need for detox from alcohol. Evaluation of trauma prior to transfer showed subacute rib fractures. Pt also notes melena (on Eliquis for afib). Serum ETOH: 373. Last drink on 3/25. On exam, tachycardic, irregular rhythm, guaiac positive, scant dried blood surrounding rectum, toxic-appearing. SEWS 13. Plan: SEWS monitoring w/ phenobarbital management, high-dose IV thiamine/folic acid supplement, IVF, telemetry, continuous pulse ox, hold Eliquis, continue other PTA meds, IV ABX, IV PPI, trend labs, replete electrolytes as needed.  He also had rapid atrial fibrillation with hypotension and alcohol ketoacidosis.  He was sent to the detox unit where his hypotension persisted while heart rate improved with treatment of alcohol withdrawal.  Also treated for hemorrhagic cystitis.  Hemoglobin also  decreased and his bedside Patient was hydrated with blood pressure now in 80s and hemoglobin repeated and decreased by 2.2. He was transferred to [place] for access to critical care unavailable at [facility] and possible emergent endoscopy     ED Triage Vitals   Temperature Pulse Respirations Blood Pressure SpO2   03/25/24 0927 03/25/24 0927 03/25/24 0927 03/25/24 0927 03/25/24 0927   (!) 97.1 °F (36.2 °C) 97 18 (!) 73/46 98 %      Temp Source Heart Rate Source Patient Position - Orthostatic VS BP Location FiO2 (%)   03/25/24 0927 03/25/24 0927 03/25/24 0927 03/25/24 0927 --   Temporal Monitor Lying Left arm       Pain Score       03/25/24 0943       7          Wt Readings from Last 1 Encounters:   03/25/24 86.2 kg (190 lb)     Vital Signs:   Date/Time Temp Pulse Resp BP MAP (mmHg) SpO2 O2 Device   03/25/24 1257 -- -- -- -- -- 87 % Abnormal  None (Room air)    O2 Device: RN placed pt on NC @ 2l at 03/25/24 1257   03/25/24 1248 98.1 °F (36.7 °C) 76 18 75/44 Abnormal  -- 90 % None (Room air)   03/25/24 1216 -- -- -- 90/60 -- -- --   03/25/24 1203 -- -- -- 77/48 Abnormal  57 Abnormal  -- --   03/25/24 1136 -- -- -- 80/48 Abnormal  -- -- --   03/25/24 1105 97.6 °F (36.4 °C) 94 18 108/63 78 93 % None (Room air)   03/25/24 1047 98.1 °F (36.7 °C) 95 18 86/52 Abnormal  -- 95 % None (Room air)   03/25/24 0930 -- -- -- 84/52 Abnormal  -- -- --   03/25/24 0927 97.1 °F (36.2 °C) Abnormal  97 18 73/46 Abnormal  -- 98 % None (Room air)       Severity of Ethanol Withdrawal Scale (SEWS):    03/25/24 0931   ANXIETY: Do you feel that something bad is about to happen to you right now? 3  -CR   NAUSEA and DRY HEAVES or VOMITING? 3  -CR   SWEATING: (includes moist palms, sweating now)? Score 0 or 2 2  -CR   TREMOR: with arms extended eyes closed? 2  -CR   AGITATION: Fidgety, restless, pacing? 3  -CR   DISORIENTATION: 0  -CR   HALLUCINATIONS: 0  -CR   VITAL SIGNS: ANY (Pulse >110, Diastolic BP >90, Temp >99.6) 0  -CR    [facility] Total Score 13  -CR         Pertinent Labs/Diagnostic Test Results:   3/25 - EKG  Sinus rhythm with Premature atrial complexes  Prolonged QT      Results from last 7 days   Lab Units 03/26/24  0506 03/25/24  1439 03/25/24  1127 03/25/24  0300   WBC Thousand/uL 4.71  --  4.96 6.47   HEMOGLOBIN g/dL 11.3* 10.2* 10.9* 13.1   HEMATOCRIT % 33.6*  --  31.9* 38.5   PLATELETS Thousands/uL 44*  --  46* 63*   NEUTROS ABS Thousands/µL 2.98  --   --   --    BANDS PCT %  --   --   --  2         Results from last 7 days   Lab Units 03/26/24  0506 03/25/24  1127 03/25/24  1032 03/25/24  0300   SODIUM mmol/L 133* 135 133* 130*   POTASSIUM mmol/L 3.9 3.5 3.6 4.0   CHLORIDE mmol/L 100 99 97 94*   CO2 mmol/L 26 22 22 20*   ANION GAP mmol/L 7 14* 14* 16*   BUN mg/dL 21 23 24 22   CREATININE mg/dL 1.15 1.67* 1.69* 1.92*   EGFR ml/min/1.73sq m 68 43 42 36   CALCIUM mg/dL 8.1* 7.6* 7.8* 8.5   MAGNESIUM mg/dL  --  1.9 1.8* 1.2*   PHOSPHORUS mg/dL  --   --   --  2.0*     Results from last 7 days   Lab Units 03/25/24  1127 03/25/24  1032 03/25/24  0300   AST U/L 88* 89* 97*   ALT U/L 47 50 56*   ALK PHOS U/L 67 71 84   TOTAL PROTEIN g/dL 6.0* 6.3* 7.0   ALBUMIN g/dL 3.6 3.8 3.8   TOTAL BILIRUBIN mg/dL 0.82 0.90 1.02*     Results from last 7 days   Lab Units 03/26/24  0702 03/25/24  2128 03/25/24  0250   POC GLUCOSE mg/dl 110 107 113     Results from last 7 days   Lab Units 03/26/24  0506 03/25/24  1127 03/25/24  1032 03/25/24  0300   GLUCOSE RANDOM mg/dL 87 84 112 97     Results from last 7 days   Lab Units 03/25/24  1206   HS TNI 0HR ng/L 54*     Results from last 7 days   Lab Units 03/26/24  0506 03/25/24  1206   PROCALCITONIN ng/ml 0.14 0.35*     Results from last 7 days   Lab Units 03/25/24  1439 03/25/24  1206   LACTIC ACID mmol/L 1.5 2.4*     Results from last 7 days   Lab Units 03/25/24  0511   CLARITY UA  Slightly Cloudy   COLOR UA  Yellow   SPEC GRAV UA  <=1.005   PH UA  6.0   GLUCOSE UA mg/dl Negative   KETONES UA mg/dl  Negative   BLOOD UA  Trace-Intact*   PROTEIN UA mg/dl 100 (2+)*   NITRITE UA  Positive*   BILIRUBIN UA  Small*   UROBILINOGEN UA E.U./dl 0.2   LEUKOCYTES UA  Negative   WBC UA /hpf 1-2   RBC UA /hpf 0-1   BACTERIA UA /hpf Occasional   EPITHELIAL CELLS WET PREP /hpf Occasional     Results from last 7 days   Lab Units 03/25/24  0511   AMPH/METH  Negative   BARBITURATE UR  Negative   BENZODIAZEPINE UR  Negative   COCAINE UR  Negative   METHADONE URINE  Negative   OPIATE UR  Negative   PCP UR  Negative   THC UR  Negative     Results from last 7 days   Lab Units 03/25/24  0300   ETHANOL LVL mg/dL 373*       Results from last 7 days   Lab Units 03/25/24  1207   BLOOD CULTURE  Received in Microbiology Lab. Culture in Progress.  Received in Microbiology Lab. Culture in Progress.                Past Medical History:   Diagnosis Date    Acute on chronic kidney failure      Alcohol withdrawal ([facility]) 06/07/2019    Atrial fibrillation ([facility])     Cancer ([facility])     prostate ca,had radiation    Cardiac disease     stents,then triple bypass    COPD (chronic obstructive pulmonary disease) ([facility])     Coronary artery disease     ETOH abuse     Heart failure ([facility])     History of heart surgery     [facility] [place] [facility]    Hx of heart artery stent     2014    Hyperlipidemia     Hypertension     Hypovolemic shock ([facility]) 12/22/2019    Lumbar spondylitis ([facility]) 10/13/2022    Nasal bone fracture 10/10/2022    Prostate CA ([facility])     S/P CABG x 3     2004    Sleep apnea      Present on Admission:   Acute kidney injury superimposed on CKD    Alcohol withdrawal syndrome with complication ([facility])   CAD (coronary artery disease)   Chronic heart failure with preserved ejection fraction (HCC)   COPD (chronic obstructive pulmonary disease) ([facility])   GERD (gastroesophageal reflux disease)   Paroxysmal atrial fibrillation ([facility])   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 disease ([facility])   Hypertension   Hyponatremia   Gastrointestinal  hemorrhage with melena   Hemorrhagic cystitis      Admitting Diagnosis: Alcohol abuse  Age/Sex: 63 y.o. male  Admission Orders:  Regular Diet. SCDs.  Fall & Seizure Precautions.  SEWS monitoring.  Telemetry & Continuous Pulse Ox.    Scheduled Medications:  cefTRIAXone, 1,000 mg, Intravenous, Q24H  folic acid, 1 mg, Intravenous, Daily  pantoprazole, 40 mg, Intravenous, Q12H  thiamine, 500 mg, Intravenous, Q8H    Continuous IV Infusions:  dextrose 5 % and sodium chloride 0.9 %, 100 mL/hr, Intravenous, Continuous    PRN Meds:  acetaminophen, 650 mg, Oral, Q6H PRN  albuterol, 2 puff, Inhalation, Q4H PRN  magnesium sulfate, 2 g, Intravenous; 3/25 x1  ondansetron, 4 mg, Intravenous, Q6H PRN  pantoprazole, 80 mg, Intravenous; 3/25 x1  phenobarbital, 650 mg, Intravenous; 3/25 x1            Network Utilization Review Department  ATTENTION: Please call with any questions or concerns to [phone] and carefully listen to the prompts so that you are directed to the right person. All voicemails are confidential.   For Discharge needs, contact Care Management DC Support Team at [phone] opt. 2  Send all requests for admission clinical reviews, approved or denied determinations and any other requests to dedicated fax number below belonging to the campus where the patient is receiving treatment. List of dedicated fax numbers for the Facilities:  FACILITY NAME UR FAX NUMBER   ADMISSION DENIALS (Administrative/Medical Necessity) [phone]   DISCHARGE SUPPORT TEAM (NETWORK) [phone]   PARENT CHILD HEALTH (Maternity/NICU/Pediatrics) [phone]   General acute hospital [phone]   [facility] [phone]   [facility] [phone]   [facility] [phone]   [facility] [phone]   [facility] [phone]   [facility] [phone]   [facility]  [facility] [phone]   [facility] [phone]   [facility] [phone]   [facility] [phone]   [facility] [phone]

## 2024-03-26 NOTE — PROGRESS NOTES
Discharge instructions reviewed with patient. Patient verbalized understanding. Importance of follow up education reviewed with patient.     Ride arranged for patient pickup. All belongings brought home with patient. Patient reports missing his keys. RN checked with 2 prior hospitals and no lost keys were reported found.  Patient verbalizes that he may have left them home.

## 2024-03-26 NOTE — PLAN OF CARE
Problem: PAIN - ADULT  Goal: Verbalizes/displays adequate comfort level or baseline comfort level  Description: Interventions:  - Encourage patient to monitor pain and request assistance  - Assess pain using appropriate pain scale  - Administer analgesics based on type and severity of pain and evaluate response  - Implement non-pharmacological measures as appropriate and evaluate response  - Consider cultural and social influences on pain and pain management  - Notify physician/advanced practitioner if interventions unsuccessful or patient reports new pain  Outcome: Progressing     Problem: INFECTION - ADULT  Goal: Absence or prevention of progression during hospitalization  Description: INTERVENTIONS:  - Assess and monitor for signs and symptoms of infection  - Monitor lab/diagnostic results  - Monitor all insertion sites, i.e. indwelling lines, tubes, and drains  - Monitor endotracheal if appropriate and nasal secretions for changes in amount and color  - [place] appropriate cooling/warming therapies per order  - Administer medications as ordered  - Instruct and encourage patient and family to use good hand hygiene technique  - Identify and instruct in appropriate isolation precautions for identified infection/condition  Outcome: Progressing  Goal: Absence of fever/infection during neutropenic period  Description: INTERVENTIONS:  - Monitor WBC    Outcome: Progressing     Problem: SAFETY ADULT  Goal: Patient will remain free of falls  Description: INTERVENTIONS:  - Educate patient/family on patient safety including physical limitations  - Instruct patient to call for assistance with activity   - Consult OT/PT to assist with strengthening/mobility   - Keep Call bell within reach  - Keep bed low and locked with side rails adjusted as appropriate  - Keep care items and personal belongings within reach  - Initiate and maintain comfort rounds  - Make Fall Risk Sign visible to staff  - Offer Toileting every 2 Hours,  in advance of need  - Initiate/Maintain bed alarm  - Apply yellow socks and bracelet for high fall risk patients  - Consider moving patient to room near nurses station  Outcome: Progressing  Goal: Maintain or return to baseline ADL function  Description: INTERVENTIONS:  -  Assess patient's ability to carry out ADLs; assess patient's baseline for ADL function and identify physical deficits which impact ability to perform ADLs (bathing, care of mouth/teeth, toileting, grooming, dressing, etc.)  - Assess/evaluate cause of self-care deficits   - Assess range of motion  - Assess patient's mobility; develop plan if impaired  - Assess patient's need for assistive devices and provide as appropriate  - Encourage maximum independence but intervene and supervise when necessary  - Involve family in performance of ADLs  - Assess for home care needs following discharge   - Consider OT consult to assist with ADL evaluation and planning for discharge  - Provide patient education as appropriate  Outcome: Progressing  Goal: Maintains/Returns to pre admission functional level  Description: INTERVENTIONS:  - Perform AM-PAC 6 Click Basic Mobility/ Daily Activity assessment daily.  - Set and communicate daily mobility goal to care team and patient/family/caregiver.   - Collaborate with rehabilitation services on mobility goals if consulted  - Perform Range of Motion 3 times a day.  - Reposition patient every 3 hours.  - Dangle patient 3 times a day  - Stand patient 3 times a day  - Ambulate patient 3 times a day  - Out of bed to chair 3 times a day   - Out of bed for meals 3 times a day  - Out of bed for toileting  - Record patient progress and toleration of activity level   Outcome: Progressing     Problem: DISCHARGE PLANNING  Goal: Discharge to home or other facility with appropriate resources  Description: INTERVENTIONS:  - Identify barriers to discharge w/patient and caregiver  - Arrange for needed discharge resources and  transportation as appropriate  - Identify discharge learning needs (meds, wound care, etc.)  - Arrange for interpretive services to assist at discharge as needed  - Refer to Case Management Department for coordinating discharge planning if the patient needs post-hospital services based on physician/advanced practitioner order or complex needs related to functional status, cognitive ability, or social support system  Outcome: Progressing     Problem: Knowledge Deficit  Goal: Patient/family/caregiver demonstrates understanding of disease process, treatment plan, medications, and discharge instructions  Description: Complete learning assessment and assess knowledge base.  Interventions:  - Provide teaching at level of understanding  - Provide teaching via preferred learning methods  Outcome: Progressing     Problem: Nutrition/Hydration-ADULT  Goal: Nutrient/Hydration intake appropriate for improving, restoring or maintaining nutritional needs  Description: Monitor and assess patient's nutrition/hydration status for malnutrition. Collaborate with interdisciplinary team and initiate plan and interventions as ordered.  Monitor patient's weight and dietary intake as ordered or per policy. Utilize nutrition screening tool and intervene as necessary. Determine patient's food preferences and provide high-protein, high-caloric foods as appropriate.     INTERVENTIONS:  - Monitor oral intake, urinary output, labs, and treatment plans  - Assess nutrition and hydration status and recommend course of action  - Evaluate amount of meals eaten  - Assist patient with eating if necessary   - Allow adequate time for meals  - Recommend/ encourage appropriate diets, oral nutritional supplements, and vitamin/mineral supplements  - Order, calculate, and assess calorie counts as needed  - Recommend, monitor, and adjust tube feedings and TPN/PPN based on assessed needs  - Assess need for intravenous fluids  - Provide specific  nutrition/hydration education as appropriate  - Include patient/family/caregiver in decisions related to nutrition  Outcome: Progressing

## 2024-03-26 NOTE — DISCHARGE INSTR - AVS FIRST PAGE
Dear Gregg Partida,     It was our pleasure to care for you here at [facility].  It is our hope that we were always able to exceed the expected standards for your care during your stay.  You were hospitalized due to low blood pressure.  You were cared for on the ICU floor by Aylin Wood MD under the service of Keith Jones* with the [facility] who covers for your primary care physician (PCP), Korin Lo MD, while you were hospitalized.  If you have any questions or concerns related to this hospitalization, you may contact us at .  For follow up as well as any medication refills, we recommend that you follow up with your primary care physician.  A registered nurse will reach out to you by phone within a few days after your discharge to answer any additional questions that you may have after going home.  However, at this time we provide for you here, the most important instructions / recommendations at discharge:     Notable Medication Adjustments -   None, continue your medications as prescribed  Important follow up information -   Follow up with your primary care physician in one week  Follow with [facility], [phone]  Follow [facility] with [facility]  Other Instructions -   We hope you feel better soon. If your symptoms worsen, please call 911 immediately or go to the nearest Emergency Department.  Please review this entire after visit summary as additional general instructions including medication list, appointments, activity, diet, any pertinent wound care, and other additional recommendations from your care team that may be provided for you.      Sincerely,     Aylin Wood

## 2024-03-26 NOTE — ASSESSMENT & PLAN NOTE
BP Readings from Last 3 Encounters:   03/26/24 169/98   03/25/24 104/55   03/09/24 160/90    Home medication Lisinopril 10 mg daily and Lopressor 50 mg BID     Plan:   - Consider restarting Lisinopril now that Creatinine is back to baseline

## 2024-03-26 NOTE — ASSESSMENT & PLAN NOTE
Patient hypotensive in the ED. : BP 68 /47 ( MAP 54 mmHg)  S/P 2 L IV normal saline and albumin 12.5.   MAP increased, stable over 65.   Home medication lisinopril 10 mg daily, Lopressor 50 mg BID.   Differential dg include dehydration, medication toxicity due to mixing doses, septic shock less likely due to lacking WBC elevation, fever.   No signs of active bleeding, his Hb remains stable.   RESOLVED   Plan:   - Discontinue MIVF @125ml/ hr.   - Consider restarting home medication

## 2024-03-26 NOTE — ASSESSMENT & PLAN NOTE
On admission 1.2 likely malnourishment in setting of alcohol abuse.   Repleted with 4 gr IV.   S/p repletion Hypomagnesemia has RESOLVED .

## 2024-03-27 ENCOUNTER — PATIENT OUTREACH (OUTPATIENT)
Age: 64
End: 2024-03-27

## 2024-03-27 ENCOUNTER — TRANSITIONAL CARE MANAGEMENT (OUTPATIENT)
Age: 64
End: 2024-03-27

## 2024-03-27 DIAGNOSIS — F10.10 ALCOHOL ABUSE: Primary | ICD-10-CM

## 2024-03-27 LAB
ATRIAL RATE: 75 BPM
P AXIS: 96 DEGREES
PR INTERVAL: 154 MS
QRS AXIS: 32 DEGREES
QRSD INTERVAL: 79 MS
QT INTERVAL: 429 MS
QTC INTERVAL: 480 MS
T WAVE AXIS: 67 DEGREES
VENTRICULAR RATE: 75 BPM

## 2024-03-27 PROCEDURE — 93010 ELECTROCARDIOGRAM REPORT: CPT | Performed by: INTERNAL MEDICINE

## 2024-03-27 NOTE — PROGRESS NOTES
"[facility] had received a referral from Gwen Deutsch,  r/t SW f/u. [facility] had completed a chart review. Per order, patient identified via High Risk Readmission report - hx alcohol abuse, declined IP rehab. Per chart, patient discharged and advised to f/u with [facility]. Per chart, patient advised to f/u [facility] which is now [facility] [phone].     [facility] had called the patient via phone. [facility] had introduced herself and reason for consult. [facility] asked the patient how he was doing. Patient reported he was doing well.    [facility] asked patient if he followed up with [facility] and/or [facility]. Patient stated he plans to f/u with [facility] on his own time. Patient stated he does NOT plan to f/u with [facility]. Patient denied any  assistance and did not want to speak any further.    [facility] provided her contact information. [facility] encouraged patient reach out if he changes his mind and needs assistance. Patient said \"okay\". [facility] unable to complete assessment with patient. Patient had no other questions, concerns or needs at this time. [facility] will close referral at this time. Please reconsult.   "

## 2024-03-27 NOTE — UTILIZATION REVIEW
NOTIFICATION OF ADMISSION DISCHARGE   This is a Notification of Discharge from [facility]. Please be advised that this patient has been discharge from our facility. Below you will find the admission and discharge date and time including the patient’s disposition.   UTILIZATION REVIEW CONTACT:  Zuly Ball MA  Utilization   Network Utilization Review Department  Phone: [phone] x carefully listen to the prompts. All voicemails are confidential.  Email: [email or web address]     ADMISSION INFORMATION  PRESENTATION DATE: 3/25/2024  1:41 PM  OBERVATION ADMISSION DATE:   INPATIENT ADMISSION DATE: 3/25/24  1:41 PM   DISCHARGE DATE: 3/26/2024  3:34 PM   DISPOSITION:Home/Self Care    Network Utilization Review Department  ATTENTION: Please call with any questions or concerns to [phone] and carefully listen to the prompts so that you are directed to the right person. All voicemails are confidential.   For Discharge needs, contact Care Management DC Support Team at [phone] opt. 2  Send all requests for admission clinical reviews, approved or denied determinations and any other requests to dedicated fax number below belonging to the campus where the patient is receiving treatment. List of dedicated fax numbers for the Facilities:  FACILITY NAME UR FAX NUMBER   ADMISSION DENIALS (Administrative/Medical Necessity) [phone]   DISCHARGE SUPPORT TEAM ([facility]) [phone]   PARENT CHILD HEALTH (Maternity/NICU/Pediatrics) [phone]   [facility] [phone]   [facility] [phone]   [facility] [phone]   [facility] [phone]   [facility] [phone]   [facility] [phone]   [facility] [phone]   [facility]  [phone]   [facility] [phone]   [facility] [phone]   [facility] [phone]   [facility] [phone]

## 2024-03-28 DIAGNOSIS — I10 ESSENTIAL (PRIMARY) HYPERTENSION: Chronic | ICD-10-CM

## 2024-03-30 LAB
ATRIAL RATE: 0 BPM
BACTERIA BLD CULT: NORMAL
BACTERIA BLD CULT: NORMAL
QRS AXIS: 0 DEGREES
QRSD INTERVAL: 0 MS
QT INTERVAL: 0 MS
QTC INTERVAL: 0 MS
T WAVE AXIS: 0 DEGREES
VENTRICULAR RATE: 0 BPM

## 2024-03-30 PROCEDURE — 93010 ELECTROCARDIOGRAM REPORT: CPT | Performed by: INTERNAL MEDICINE

## 2024-04-01 RX ORDER — RANOLAZINE 500 MG/1
TABLET, EXTENDED RELEASE ORAL
Qty: 60 TABLET | Refills: 3 | Status: SHIPPED | OUTPATIENT
Start: 2024-04-01

## 2024-04-05 DIAGNOSIS — E11.00 TYPE 2 DIABETES MELLITUS WITH HYPEROSMOLARITY WITHOUT COMA, WITHOUT LONG-TERM CURRENT USE OF INSULIN (HCC): Chronic | ICD-10-CM

## 2024-04-05 DIAGNOSIS — Z85.46 HX OF PROSTATIC MALIGNANCY: ICD-10-CM

## 2024-04-05 RX ORDER — TAMSULOSIN HYDROCHLORIDE 0.4 MG/1
CAPSULE ORAL
Qty: 90 CAPSULE | Refills: 1 | Status: SHIPPED | OUTPATIENT
Start: 2024-04-05

## 2024-04-15 ENCOUNTER — TELEPHONE (OUTPATIENT)
Dept: NEPHROLOGY | Facility: CLINIC | Age: 64
End: 2024-04-15

## 2024-04-15 NOTE — TELEPHONE ENCOUNTER
Patient had no transportation to visit and is a missed appointment. He has been reschedule with [facility] in June.

## 2024-04-16 ENCOUNTER — HOSPITAL ENCOUNTER (EMERGENCY)
Facility: HOSPITAL | Age: 64
Discharge: HOME | End: 2024-04-17
Attending: EMERGENCY MEDICINE
Payer: COMMERCIAL

## 2024-04-16 ENCOUNTER — APPOINTMENT (EMERGENCY)
Dept: RADIOLOGY | Facility: HOSPITAL | Age: 64
End: 2024-04-16
Payer: COMMERCIAL

## 2024-04-16 DIAGNOSIS — S09.90XA INJURY OF HEAD, INITIAL ENCOUNTER: ICD-10-CM
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
## 2024-04-16 LAB — GLUCOSE SERPL-MCNC: 111 MG/DL (ref 65–140)

## 2024-04-16 PROCEDURE — 70450 CT HEAD/BRAIN W/O DYE: CPT

## 2024-04-16 PROCEDURE — 82948 REAGENT STRIP/BLOOD GLUCOSE: CPT

## 2024-04-16 PROCEDURE — 99284 EMERGENCY DEPT VISIT MOD MDM: CPT

## 2024-04-16 PROCEDURE — 99284 EMERGENCY DEPT VISIT MOD MDM: CPT | Performed by: EMERGENCY MEDICINE

## 2024-04-16 PROCEDURE — 72125 CT NECK SPINE W/O DYE: CPT

## 2024-04-17 VITALS
WEIGHT: 182.1 LBS | DIASTOLIC BLOOD PRESSURE: 55 MMHG | OXYGEN SATURATION: 93 % | SYSTOLIC BLOOD PRESSURE: 91 MMHG | BODY MASS INDEX: 23.37 KG/M2 | HEIGHT: 74 IN | HEART RATE: 82 BPM | TEMPERATURE: 98.2 F | RESPIRATION RATE: 18 BRPM

## 2024-04-17 NOTE — ED PROVIDER NOTES
"History  Chief Complaint   Patient presents with    Fall     Pt admits to etoh \"too much probably two fifths of liquor\" reported to have fallen at home on tile floor, 911 called by neighbor. Pt c/o posterior neck pain but admits to chronic pain and sx to same, c collar applied on arrival. Speech slurred. Oriented to person place and month      64-year-old male presents after a fall he took too much liquor today said he fell today hit his head denies any loss of consciousness does admit to a slight headache and neck pain denies any nausea vomiting not on blood thinners well-known to our ER he is currently on Eliquis.      History provided by:  Patient   used: No        Prior to Admission Medications   Prescriptions Last Dose Informant Patient Reported? Taking?   Blood Glucose Monitoring Suppl (ONE TOUCH ULTRA MINI) w/Device KIT  Self Yes No   Sig: Use as directed   Patient not taking: Reported on 3/25/2024   Blood Pressure Monitoring (Adult Blood Pressure Cuff Lg) KIT   No No   Sig: Use in the morning   Patient not taking: Reported on 3/25/2024   Breo Ellipta 200-25 MCG/ACT inhaler  Self No No   Sig: Inhale 1 puff daily Rinse mouth after use.   Patient not taking: Reported on 3/25/2024   ONETOUCH DELICA LANCETS FINE MISC  Self Yes No   Sig: 3 (three) times a day Test   Thiamine HCl (vitamin B-1) 100 MG TABS  Self No No   Sig: TAKE 1 TABLET DAILY   Patient not taking: Reported on 3/25/2024   albuterol (Ventolin HFA) 90 mcg/act inhaler  Self No No   Sig: Inhale 2 puffs every 4 (four) hours as needed for wheezing   Patient not taking: Reported on 3/25/2024   aluminum-magnesium hydroxide-simethicone (MAALOX) 6692-3514-666 mg/30 mL suspension   No No   Sig: Take 30 mL by mouth every 4 (four) hours as needed for indigestion or heartburn   Patient not taking: Reported on 3/25/2024   apixaban (ELIQUIS) 5 mg   No No   Sig: Take 1 tablet (5 mg total) by mouth 2 (two) times a day Do not start before January " 31, 2024.   aspirin (ECOTRIN LOW STRENGTH) 81 mg EC tablet  Self Yes No   Sig: Take 81 mg by mouth daily   ferrous sulfate 325 (65 Fe) mg tablet  Self No No   Sig: Take 1 tablet (325 mg total) by mouth daily with breakfast   folic acid (FOLVITE) 1 mg tablet   No No   Sig: TAKE 1 TABLET DAILY   gabapentin (NEURONTIN) 300 mg capsule   No No   Sig: TAKE ONE CAPSULE THREE TIMES DAILY   lisinopril (ZESTRIL) 20 mg tablet  Self No No   Sig: Take 0.5 tablets (10 mg total) by mouth daily   magnesium Oxide (MAG-OX) 400 mg TABS   No No   Sig: Take 1 tablet (400 mg total) by mouth 2 (two) times a day   metFORMIN (GLUCOPHAGE) 500 mg tablet   No No   Sig: TAKE 1 TABLET DAILY WITH BREAKFAST   metoprolol tartrate (LOPRESSOR) 50 mg tablet   No No   Sig: Take 1 tablet (50 mg total) by mouth every 12 (twelve) hours   naltrexone (REVIA) 50 mg tablet  Self No No   Sig: Take 1 tablet (50 mg total) by mouth daily   Patient not taking: Reported on 3/25/2024   nicotine (NICODERM CQ) 21 mg/24 hr TD 24 hr patch  Self No No   Sig: Place 1 patch on the skin over 24 hours daily Do not start before December 4, 2023.   Patient not taking: Reported on 3/25/2024   pantoprazole (PROTONIX) 40 mg tablet   No No   Sig: TAKE 1 TABLET TWO TIMES A DAY   pravastatin (PRAVACHOL) 40 mg tablet  Self No No   Sig: TAKE 1 TABLET DAILY WITH DINNER   ranolazine (RANEXA) 500 mg 12 hr tablet   No No   Sig: TAKE 1 TABLET EVERY 12 HOURS   senna-docusate sodium (SENOKOT S) 8.6-50 mg per tablet   No No   Sig: Take 1 tablet by mouth daily as needed for constipation   Patient not taking: Reported on 3/25/2024   tamsulosin (FLOMAX) 0.4 mg   No No   Sig: TAKE 1 CAPSULE DAILY   varenicline (CHANTIX) 1 mg tablet   No No   Sig: TAKE 1 TABLET 2 TIMES A DAY   Patient not taking: Reported on 3/25/2024      Facility-Administered Medications: None       Past Medical History:   Diagnosis Date    Acute on chronic kidney failure  (HCC)     Alcohol withdrawal (HCC) 06/07/2019    Atrial  fibrillation (HCC)     Cancer (HCC)     prostate ca,had radiation    Cardiac disease     stents,then triple bypass    COPD (chronic obstructive pulmonary disease) (HCC)     Coronary artery disease     ETOH abuse     Heart failure (HCC)     History of heart surgery     says triple bypass [facility]    Hx of heart artery stent     2014    Hyperlipidemia     Hypertension     Hypovolemic shock (HCC) 12/22/2019    Lumbar spondylitis (HCC) 10/13/2022    Nasal bone fracture 10/10/2022    Prostate CA (HCC)     S/P CABG x 3     2004    Sleep apnea        Past Surgical History:   Procedure Laterality Date    CARDIAC CATHETERIZATION      2 stents    CORONARY ARTERY BYPASS GRAFT  2004    MO ARTHRD ANT INTERBODY MIN DSC CRV BELOW C2 N/A 12/16/2020    Procedure: Anterior cervical discectomy with fusion C4-C7; Posterior cervical decompression and fusion C2-T2;  Surgeon: David Rowell MD;  Location: [facility];  Service: Neurosurgery    TONSILLECTOMY         Family History   Problem Relation Age of Onset    Diabetes Mother     Uterine cancer Mother     COPD Father     Hypertension Father      I have reviewed and agree with the history as documented.    E-Cigarette/Vaping    E-Cigarette Use Never User      E-Cigarette/Vaping Substances    Nicotine Yes     THC No     CBD No     Flavoring No     Other No     Unknown No      Social History     Tobacco Use    Smoking status: Every Day     Current packs/day: 1.50     Average packs/day: 1.5 packs/day for 40.0 years (60.0 ttl pk-yrs)     Types: Cigarettes    Smokeless tobacco: Never   Vaping Use    Vaping status: Never Used   Substance Use Topics    Alcohol use: Yes     Alcohol/week: 4.0 standard drinks of alcohol     Types: 4 Standard drinks or equivalent per week     Comment: quart of vodka daily    Drug use: No       Review of Systems   Constitutional: Negative.    HENT: Negative.     Eyes: Negative.    Respiratory: Negative.     Cardiovascular: Negative.    Gastrointestinal:  Negative.    Endocrine: Negative.    Genitourinary: Negative.    Musculoskeletal:  Positive for neck pain.   Skin: Negative.    Allergic/Immunologic: Negative.    Neurological: Negative.    Hematological: Negative.    Psychiatric/Behavioral: Negative.     All other systems reviewed and are negative.      Physical Exam  Physical Exam  Vitals and nursing note reviewed.   Constitutional:       Appearance: Normal appearance.   HENT:      Head: Normocephalic and atraumatic.      Nose: Nose normal.      Mouth/Throat:      Mouth: Mucous membranes are moist.   Eyes:      Extraocular Movements: Extraocular movements intact.      Pupils: Pupils are equal, round, and reactive to light.   Cardiovascular:      Rate and Rhythm: Normal rate and regular rhythm.   Pulmonary:      Effort: Pulmonary effort is normal.      Breath sounds: Normal breath sounds.   Abdominal:      General: Abdomen is flat. Bowel sounds are normal.      Palpations: Abdomen is soft.   Musculoskeletal:         General: Normal range of motion.      Cervical back: Normal range of motion and neck supple.   Skin:     General: Skin is warm.      Capillary Refill: Capillary refill takes less than 2 seconds.   Neurological:      General: No focal deficit present.      Mental Status: He is alert and oriented to person, place, and time. Mental status is at baseline.      Cranial Nerves: No cranial nerve deficit.      Sensory: No sensory deficit.      Motor: No weakness.      Coordination: Coordination normal.      Gait: Gait normal.      Deep Tendon Reflexes: Reflexes normal.   Psychiatric:         Mood and Affect: Mood normal.         Thought Content: Thought content normal.         Vital Signs  ED Triage Vitals [04/16/24 2129]   Temperature Pulse Respirations Blood Pressure SpO2   98.2 °F (36.8 °C) 94 18 118/77 95 %      Temp Source Heart Rate Source Patient Position - Orthostatic VS BP Location FiO2 (%)   Oral Monitor Lying Right arm --      Pain Score       5            Vitals:    04/16/24 2129 04/16/24 2215 04/16/24 2330 04/17/24 0000   BP: 118/77 119/66 91/60 91/55   Pulse: 94 92 89 82   Patient Position - Orthostatic VS: Lying Lying Lying Lying         Visual Acuity  Visual Acuity      Flowsheet Row Most Recent Value   L Pupil Size (mm) 4   R Pupil Size (mm) 4   L Pupil Shape Round   R Pupil Shape Round            ED Medications  Medications - No data to display    Diagnostic Studies  Results Reviewed       Procedure Component Value Units Date/Time    Fingerstick Glucose (POCT) [[id number]]  (Normal) Collected: 04/16/24 2130    Lab Status: Final result Specimen: Blood Updated: 04/16/24 2131     POC Glucose 111 mg/dl                    CT head without contrast   Final Result by Nereyda Sotelo MD (04/16 2340)      No acute intracranial abnormality.                  Workstation performed: [id number]         CT spine cervical without contrast   Final Result by Nereyda Sotelo MD (04/16 2340)      No cervical spine fracture or traumatic malalignment.                  Workstation performed: [id number]                    Procedures  Procedures         ED Course                               SBIRT 22yo+      Flowsheet Row Most Recent Value   Initial Alcohol Screen: US AUDIT-C     1. How often do you have a drink containing alcohol? 6 Filed at: 04/16/2024 2132   2. How many drinks containing alcohol do you have on a typical day you are drinking?  6 Filed at: 04/16/2024 2132   3a. Male UNDER 65: How often do you have five or more drinks on one occasion? 6 Filed at: 04/16/2024 2132   Audit-C Score 18 Filed at: 04/16/2024 2132   Full Alcohol Screen: US AUDIT    4. How often during the last year have you found that you were not able to stop drinking once you had started? 4 Filed at: 04/16/2024 2132   5. How often during past year have you failed to do what was normally expected of you because of drinking?  4 Filed at: 04/16/2024 2132   6. How often in past year have you needed a  first drink in the morning to get yourself going after a heavy drinking session?  4 Filed at: 04/16/2024 2132   7. How often in past year have you had feeling of guilt or remorse after drinking?  4 Filed at: 04/16/2024 2132   8. How often in past year have you been unable to remember what happened night before because you had been drinking?  4 Filed at: 04/16/2024 2132   9. Have you or someone else been injured as a result of your drinking?  4 Filed at: 04/16/2024 2132   10. Has a relative, friend, doctor or other health worker been concerned about your drinking and suggested you cut down?  4 Filed at: 04/16/2024 2132   AUDIT Total Score 46 Filed at: 04/16/2024 2132   BRIGIDA: How many times in the past year have you...    Used an illegal drug or used a prescription medication for non-medical reasons? Never Filed at: 04/16/2024 2132                      Medical Decision Making  Patient evaluated with imaging.  I reviewed the results and discussed them with the patient.  Patient discharged with appropriate instructions   and follow-up.  Patient verbalized understanding had no further questions at the time of discharge.  Patient had stable vital signs and well-appearing at the time of discharge.    Amount and/or Complexity of Data Reviewed  External Data Reviewed: notes.  Radiology: ordered. Decision-making details documented in ED Course.             Disposition  Final diagnoses:   Fall, initial encounter   Injury of head, initial encounter     Time reflects when diagnosis was documented in both MDM as applicable and the Disposition within this note       Time User Action Codes Description Comment    4/17/2024 12:50 AM Trish Madden Add [W19.XXXA] Fall, initial encounter     4/17/2024 12:50 AM Trish Madden Add [S09.90XA] Injury of head, initial encounter           ED Disposition       ED Disposition   Discharge    Condition   Stable    Date/Time   Wed Apr 17, 2024 0050    Comment   Gregg Partida discharge to  home/self care.                   Follow-up Information       Follow up With Specialties Details Why Contact Info Additional Information    [facility] Emergency Department Emergency Medicine  If symptoms worsen [address] [id number] [phone] [facility] Emergency Department, [address] [place], [place], [address]            Patient's Medications   Discharge Prescriptions    No medications on file       No discharge procedures on file.    PDMP Review         Value Time User    PDMP Reviewed  Yes 1/24/2024  9:23 AM Tyrone Freire DO            ED Provider  Electronically Signed by             Trish Madden DO  04/17/24 0050

## 2024-04-17 NOTE — ED NOTES
Pt ambulates steadily with walker. Pt sts he uses walker at home     Samantha M Goodell, RN  04/17/24 0151

## 2024-04-22 ENCOUNTER — APPOINTMENT (EMERGENCY)
Dept: RADIOLOGY | Facility: HOSPITAL | Age: 64
End: 2024-04-22
Payer: COMMERCIAL

## 2024-04-22 ENCOUNTER — HOSPITAL ENCOUNTER (EMERGENCY)
Facility: HOSPITAL | Age: 64
Discharge: HOME | End: 2024-04-23
Attending: EMERGENCY MEDICINE
Payer: COMMERCIAL
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
## 2024-04-22 DIAGNOSIS — S09.90XA CLOSED HEAD INJURY, INITIAL ENCOUNTER: ICD-10-CM

## 2024-04-22 PROCEDURE — 99285 EMERGENCY DEPT VISIT HI MDM: CPT

## 2024-04-22 PROCEDURE — 99284 EMERGENCY DEPT VISIT MOD MDM: CPT | Performed by: EMERGENCY MEDICINE

## 2024-04-22 PROCEDURE — 72125 CT NECK SPINE W/O DYE: CPT

## 2024-04-22 PROCEDURE — 70450 CT HEAD/BRAIN W/O DYE: CPT

## 2024-04-23 VITALS
SYSTOLIC BLOOD PRESSURE: 100 MMHG | TEMPERATURE: 97.7 F | RESPIRATION RATE: 16 BRPM | DIASTOLIC BLOOD PRESSURE: 60 MMHG | OXYGEN SATURATION: 96 % | HEART RATE: 87 BPM

## 2024-04-23 VITALS
RESPIRATION RATE: 18 BRPM | WEIGHT: 182 LBS | OXYGEN SATURATION: 96 % | BODY MASS INDEX: 23.37 KG/M2 | DIASTOLIC BLOOD PRESSURE: 70 MMHG | HEART RATE: 92 BPM | TEMPERATURE: 97.5 F | SYSTOLIC BLOOD PRESSURE: 138 MMHG

## 2024-04-23 DIAGNOSIS — S91.109A AVULSION OF SKIN OF TOE, INITIAL ENCOUNTER: Primary | ICD-10-CM
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
## 2024-04-23 PROCEDURE — 99284 EMERGENCY DEPT VISIT MOD MDM: CPT | Performed by: EMERGENCY MEDICINE

## 2024-04-23 PROCEDURE — 99282 EMERGENCY DEPT VISIT SF MDM: CPT

## 2024-04-23 NOTE — ED PROVIDER NOTES
History  Chief Complaint   Patient presents with    Toe Laceration     Pt reports of l big toe lac for 3 days, on eliquis     64-year-old male with past history of hypertension, hyperlipidemia, COPD, CABG x 3, alcohol abuse, sleep apnea, atrial fibrillation on Eliquis, presents to the ED for evaluation of left great toe laceration.  Patient states that he stepped on a piece of glass yesterday and has a skin avulsion to the distal/lateral aspect left great toe.  Area has not stopped bleeding since that time.  Patient's tetanus is up-to-date.  Subsequently patient called ambulance and was brought to the ED for further evaluation and management.  Bleeding stopped when pressure dressing applied by EMS workers.      History provided by:  Patient      Prior to Admission Medications   Prescriptions Last Dose Informant Patient Reported? Taking?   Blood Glucose Monitoring Suppl (ONE TOUCH ULTRA MINI) w/Device KIT  Self Yes No   Sig: Use as directed   Patient not taking: Reported on 3/25/2024   Blood Pressure Monitoring (Adult Blood Pressure Cuff Lg) KIT   No No   Sig: Use in the morning   Patient not taking: Reported on 3/25/2024   Breo Ellipta 200-25 MCG/ACT inhaler  Self No No   Sig: Inhale 1 puff daily Rinse mouth after use.   Patient not taking: Reported on 3/25/2024   ONETOUCH DELICA LANCETS FINE MISC  Self Yes No   Sig: 3 (three) times a day Test   Thiamine HCl (vitamin B-1) 100 MG TABS  Self No No   Sig: TAKE 1 TABLET DAILY   Patient not taking: Reported on 3/25/2024   albuterol (Ventolin HFA) 90 mcg/act inhaler  Self No No   Sig: Inhale 2 puffs every 4 (four) hours as needed for wheezing   Patient not taking: Reported on 3/25/2024   aluminum-magnesium hydroxide-simethicone (MAALOX) 9724-3441-180 mg/30 mL suspension   No No   Sig: Take 30 mL by mouth every 4 (four) hours as needed for indigestion or heartburn   Patient not taking: Reported on 3/25/2024   apixaban (ELIQUIS) 5 mg   No No   Sig: Take 1 tablet (5 mg  total) by mouth 2 (two) times a day Do not start before January 31, 2024.   aspirin (ECOTRIN LOW STRENGTH) 81 mg EC tablet  Self Yes No   Sig: Take 81 mg by mouth daily   ferrous sulfate 325 (65 Fe) mg tablet  Self No No   Sig: Take 1 tablet (325 mg total) by mouth daily with breakfast   folic acid (FOLVITE) 1 mg tablet   No No   Sig: TAKE 1 TABLET DAILY   gabapentin (NEURONTIN) 300 mg capsule   No No   Sig: TAKE ONE CAPSULE THREE TIMES DAILY   lisinopril (ZESTRIL) 20 mg tablet  Self No No   Sig: Take 0.5 tablets (10 mg total) by mouth daily   magnesium Oxide (MAG-OX) 400 mg TABS   No No   Sig: Take 1 tablet (400 mg total) by mouth 2 (two) times a day   metFORMIN (GLUCOPHAGE) 500 mg tablet   No No   Sig: TAKE 1 TABLET DAILY WITH BREAKFAST   metoprolol tartrate (LOPRESSOR) 50 mg tablet   No No   Sig: Take 1 tablet (50 mg total) by mouth every 12 (twelve) hours   naltrexone (REVIA) 50 mg tablet  Self No No   Sig: Take 1 tablet (50 mg total) by mouth daily   Patient not taking: Reported on 3/25/2024   nicotine (NICODERM CQ) 21 mg/24 hr TD 24 hr patch  Self No No   Sig: Place 1 patch on the skin over 24 hours daily Do not start before December 4, 2023.   Patient not taking: Reported on 3/25/2024   pantoprazole (PROTONIX) 40 mg tablet   No No   Sig: TAKE 1 TABLET TWO TIMES A DAY   pravastatin (PRAVACHOL) 40 mg tablet  Self No No   Sig: TAKE 1 TABLET DAILY WITH DINNER   ranolazine (RANEXA) 500 mg 12 hr tablet   No No   Sig: TAKE 1 TABLET EVERY 12 HOURS   senna-docusate sodium (SENOKOT S) 8.6-50 mg per tablet   No No   Sig: Take 1 tablet by mouth daily as needed for constipation   Patient not taking: Reported on 3/25/2024   tamsulosin (FLOMAX) 0.4 mg   No No   Sig: TAKE 1 CAPSULE DAILY   varenicline (CHANTIX) 1 mg tablet   No No   Sig: TAKE 1 TABLET 2 TIMES A DAY   Patient not taking: Reported on 3/25/2024      Facility-Administered Medications: None       Past Medical History:   Diagnosis Date    Acute on chronic kidney  failure  (HCC)     Alcohol withdrawal (HCC) 06/07/2019    Atrial fibrillation (HCC)     Cancer (HCC)     prostate ca,had radiation    Cardiac disease     stents,then triple bypass    COPD (chronic obstructive pulmonary disease) (HCC)     Coronary artery disease     ETOH abuse     Heart failure (HCC)     History of heart surgery     says triple bypass [facility]    Hx of heart artery stent     2014    Hyperlipidemia     Hypertension     Hypovolemic shock (HCC) 12/22/2019    Lumbar spondylitis ([facility]) 10/13/2022    Nasal bone fracture 10/10/2022    Prostate CA (HCC)     S/P CABG x 3     2004    Sleep apnea        Past Surgical History:   Procedure Laterality Date    CARDIAC CATHETERIZATION      2 stents    CORONARY ARTERY BYPASS GRAFT  2004    LA ARTHRD ANT INTERBODY MIN DSC CRV BELOW C2 N/A 12/16/2020    Procedure: Anterior cervical discectomy with fusion C4-C7; Posterior cervical decompression and fusion C2-T2;  Surgeon: David Rowell MD;  Location: [facility];  Service: Neurosurgery    TONSILLECTOMY         Family History   Problem Relation Age of Onset    Diabetes Mother     Uterine cancer Mother     COPD Father     Hypertension Father      I have reviewed and agree with the history as documented.    E-Cigarette/Vaping    E-Cigarette Use Never User      E-Cigarette/Vaping Substances    Nicotine Yes     THC No     CBD No     Flavoring No     Other No     Unknown No      Social History     Tobacco Use    Smoking status: Every Day     Current packs/day: 1.50     Average packs/day: 1.5 packs/day for 40.0 years (60.0 ttl pk-yrs)     Types: Cigarettes    Smokeless tobacco: Never   Vaping Use    Vaping status: Never Used   Substance Use Topics    Alcohol use: Yes     Alcohol/week: 4.0 standard drinks of alcohol     Types: 4 Standard drinks or equivalent per week     Comment: quart of vodka daily    Drug use: No       Review of Systems   Constitutional:  Negative for chills and fever.   HENT:  Negative for ear pain and  sore throat.    Eyes:  Negative for pain and visual disturbance.   Respiratory:  Negative for cough and shortness of breath.    Cardiovascular:  Negative for chest pain and palpitations.   Gastrointestinal:  Negative for abdominal pain and vomiting.   Genitourinary:  Negative for dysuria and hematuria.   Musculoskeletal:  Negative for arthralgias and back pain.   Skin:  Positive for wound. Negative for color change and rash.   Neurological:  Negative for seizures and syncope.   All other systems reviewed and are negative.      Physical Exam  Physical Exam  Vitals and nursing note reviewed.   Constitutional:       General: He is not in acute distress.     Appearance: He is well-developed.   HENT:      Head: Normocephalic and atraumatic.   Eyes:      Conjunctiva/sclera: Conjunctivae normal.   Cardiovascular:      Rate and Rhythm: Normal rate and regular rhythm.      Heart sounds: No murmur heard.  Pulmonary:      Effort: Pulmonary effort is normal. No respiratory distress.      Breath sounds: Normal breath sounds.   Abdominal:      Palpations: Abdomen is soft.      Tenderness: There is no abdominal tenderness.   Musculoskeletal:         General: No swelling.      Cervical back: Neck supple.      Comments: Pulses intact to bilateral lower extremities.  Avulsed skin with active bleeding noted to distal lateral aspect of left great toe.  Please see picture below for clarification.   Skin:     General: Skin is warm and dry.      Capillary Refill: Capillary refill takes less than 2 seconds.   Neurological:      Mental Status: He is alert.   Psychiatric:         Mood and Affect: Mood normal.         Vital Signs  ED Triage Vitals   Temperature Pulse Respirations Blood Pressure SpO2   04/23/24 1555 04/23/24 1542 04/23/24 1555 04/23/24 1542 04/23/24 1542   97.5 °F (36.4 °C) 92 18 138/70 98 %      Temp Source Heart Rate Source Patient Position - Orthostatic VS BP Location FiO2 (%)   04/23/24 1555 04/23/24 1542 -- -- --    Tympanic Monitor         Pain Score       04/23/24 1555       No Pain           Vitals:    04/23/24 1542   BP: 138/70   Pulse: 92         Visual Acuity      ED Medications  Medications - No data to display    Diagnostic Studies  Results Reviewed       None                   No orders to display              Procedures  Universal Protocol:  Consent: Verbal consent obtained.  Risks and benefits: risks, benefits and alternatives were discussed  Consent given by: patient  Laceration repair    Date/Time: 4/23/2024 4:10 PM    Performed by: Emma Erwin DO  Authorized by: Emma Erwin DO  Body area: lower extremity  Location details: left big toe  Wound length (cm): Skin avulsion.  Tendon involvement: none  Nerve involvement: none  Vascular damage: no    Sedation:  Patient sedated: no        Procedure Details:  Patient tolerance: patient tolerated the procedure well with no immediate complications  Comments: Surgifoam applied to avulsed skin with gauze dressing and bleeding was controlled.               ED Course                                             Medical Decision Making  Patient presented to the ED for bleeding from avulsed skin to left great toe.  Bleeding controlled with Surgifoam as well as gauze roll dressing.  Patient discharged home with follow-up to PCP in 2 days for wound check.  Close return instructions given to return to the ER for any worsening symptoms.  Patient agrees with discharge plan.  Patient well appearing at time of discharge.    Please Note: Fluency Direct voice recognition software may have been used in the creation of this document. Wrong words or sound a like substitutions may have occurred due to the inherent limitations of the voice software.                  Disposition  Final diagnoses:   Avulsion of skin of toe, initial encounter     Time reflects when diagnosis was documented in both MDM as applicable and the Disposition within this note       Time User Action Codes  Description Comment    4/23/2024  4:19 PM Emma Erwin Add [S91.109A] Avulsion of skin of toe, initial encounter           ED Disposition       ED Disposition   Discharge    Condition   Stable    Date/Time   Tue Apr 23, 2024  4:19 PM    Comment   Gregg Helga discharge to home/self care.                   Follow-up Information       Follow up With Specialties Details Why Contact Info    [facility] Schedule an appointment as soon as possible for a visit in 2 days For wound re-check [address]  [address]  [facility] [id number] [phone]              Patient's Medications   Discharge Prescriptions    No medications on file       No discharge procedures on file.    PDMP Review         Value Time User    PDMP Reviewed  Yes 1/24/2024  9:23 AM Tyrone Freire DO            ED Provider  Electronically Signed by             Emma Erwin DO  04/23/24 [id number]

## 2024-04-23 NOTE — ED PROVIDER NOTES
History  Chief Complaint   Patient presents with    Fall     Pt states fall on concrete, hit head and on eliquis. States also hasn't had alcohol in 24 hours. Ordinarily drinks over a quart of vodka daily.     Withdrawal - Alcohol     64-year-old male past medical significant for alcohol use presenting to ED today after a fall with head strike.  Patient states that last drink was about 24 hours ago.  He fell and hit his head though and he is on Eliquis.  He is denying any pain at this time.  Does not want any alcohol for now.  He is asking for a walker now because he is having difficulty with walking over the last couple weeks.        Prior to Admission Medications   Prescriptions Last Dose Informant Patient Reported? Taking?   Blood Glucose Monitoring Suppl (ONE TOUCH ULTRA MINI) w/Device KIT  Self Yes No   Sig: Use as directed   Patient not taking: Reported on 3/25/2024   Blood Pressure Monitoring (Adult Blood Pressure Cuff Lg) KIT   No No   Sig: Use in the morning   Patient not taking: Reported on 3/25/2024   Breo Ellipta 200-25 MCG/ACT inhaler  Self No No   Sig: Inhale 1 puff daily Rinse mouth after use.   Patient not taking: Reported on 3/25/2024   ONETOUCH DELICA LANCETS FINE MISC  Self Yes No   Sig: 3 (three) times a day Test   Thiamine HCl (vitamin B-1) 100 MG TABS  Self No No   Sig: TAKE 1 TABLET DAILY   Patient not taking: Reported on 3/25/2024   albuterol (Ventolin HFA) 90 mcg/act inhaler  Self No No   Sig: Inhale 2 puffs every 4 (four) hours as needed for wheezing   Patient not taking: Reported on 3/25/2024   aluminum-magnesium hydroxide-simethicone (MAALOX) 6272-4367-066 mg/30 mL suspension   No No   Sig: Take 30 mL by mouth every 4 (four) hours as needed for indigestion or heartburn   Patient not taking: Reported on 3/25/2024   apixaban (ELIQUIS) 5 mg   No No   Sig: Take 1 tablet (5 mg total) by mouth 2 (two) times a day Do not start before January 31, 2024.   aspirin (ECOTRIN LOW STRENGTH) 81 mg EC  tablet  Self Yes No   Sig: Take 81 mg by mouth daily   ferrous sulfate 325 (65 Fe) mg tablet  Self No No   Sig: Take 1 tablet (325 mg total) by mouth daily with breakfast   folic acid (FOLVITE) 1 mg tablet   No No   Sig: TAKE 1 TABLET DAILY   gabapentin (NEURONTIN) 300 mg capsule   No No   Sig: TAKE ONE CAPSULE THREE TIMES DAILY   lisinopril (ZESTRIL) 20 mg tablet  Self No No   Sig: Take 0.5 tablets (10 mg total) by mouth daily   magnesium Oxide (MAG-OX) 400 mg TABS   No No   Sig: Take 1 tablet (400 mg total) by mouth 2 (two) times a day   metFORMIN (GLUCOPHAGE) 500 mg tablet   No No   Sig: TAKE 1 TABLET DAILY WITH BREAKFAST   metoprolol tartrate (LOPRESSOR) 50 mg tablet   No No   Sig: Take 1 tablet (50 mg total) by mouth every 12 (twelve) hours   naltrexone (REVIA) 50 mg tablet  Self No No   Sig: Take 1 tablet (50 mg total) by mouth daily   Patient not taking: Reported on 3/25/2024   nicotine (NICODERM CQ) 21 mg/24 hr TD 24 hr patch  Self No No   Sig: Place 1 patch on the skin over 24 hours daily Do not start before December 4, 2023.   Patient not taking: Reported on 3/25/2024   pantoprazole (PROTONIX) 40 mg tablet   No No   Sig: TAKE 1 TABLET TWO TIMES A DAY   pravastatin (PRAVACHOL) 40 mg tablet  Self No No   Sig: TAKE 1 TABLET DAILY WITH DINNER   ranolazine (RANEXA) 500 mg 12 hr tablet   No No   Sig: TAKE 1 TABLET EVERY 12 HOURS   senna-docusate sodium (SENOKOT S) 8.6-50 mg per tablet   No No   Sig: Take 1 tablet by mouth daily as needed for constipation   Patient not taking: Reported on 3/25/2024   tamsulosin (FLOMAX) 0.4 mg   No No   Sig: TAKE 1 CAPSULE DAILY   varenicline (CHANTIX) 1 mg tablet   No No   Sig: TAKE 1 TABLET 2 TIMES A DAY   Patient not taking: Reported on 3/25/2024      Facility-Administered Medications: None       Past Medical History:   Diagnosis Date    Acute on chronic kidney failure  (HCC)     Alcohol withdrawal (HCC) 06/07/2019    Atrial fibrillation (HCC)     Cancer (HCC)     prostate  ca,had radiation    Cardiac disease     stents,then triple bypass    COPD (chronic obstructive pulmonary disease) (HCC)     Coronary artery disease     ETOH abuse     Heart failure ([facility])     History of heart surgery     says triple bypass [facility]    Hx of heart artery stent     2014    Hyperlipidemia     Hypertension     Hypovolemic shock ([facility]) 12/22/2019    Lumbar spondylitis ([facility]) 10/13/2022    Nasal bone fracture 10/10/2022    Prostate CA ([facility])     S/P CABG x 3     2004    Sleep apnea        Past Surgical History:   Procedure Laterality Date    CARDIAC CATHETERIZATION      2 stents    CORONARY ARTERY BYPASS GRAFT  2004    [place] ARTHRD ANT INTERBODY MIN DSC CRV BELOW C2 N/A 12/16/2020    Procedure: Anterior cervical discectomy with fusion C4-C7; Posterior cervical decompression and fusion C2-T2;  Surgeon: David Rowell MD;  Location: [facility];  Service: Neurosurgery    TONSILLECTOMY         Family History   Problem Relation Age of Onset    Diabetes Mother     Uterine cancer Mother     COPD Father     Hypertension Father      I have reviewed and agree with the history as documented.    E-Cigarette/Vaping    E-Cigarette Use Never User      E-Cigarette/Vaping Substances    Nicotine Yes     THC No     CBD No     Flavoring No     Other No     Unknown No      Social History     Tobacco Use    Smoking status: Every Day     Current packs/day: 1.50     Average packs/day: 1.5 packs/day for 40.0 years (60.0 ttl pk-yrs)     Types: Cigarettes    Smokeless tobacco: Never   Vaping Use    Vaping status: Never Used   Substance Use Topics    Alcohol use: Yes     Alcohol/week: 4.0 standard drinks of alcohol     Types: 4 Standard drinks or equivalent per week     Comment: quart of vodka daily    Drug use: No       Review of Systems   Constitutional:  Negative for chills and fever.   HENT:  Negative for hearing loss.    Eyes:  Negative for visual disturbance.   Respiratory:  Negative for shortness of breath.    Cardiovascular:   Negative for chest pain.   Gastrointestinal:  Negative for abdominal pain, constipation, diarrhea, nausea and vomiting.   Genitourinary:  Negative for difficulty urinating.   Musculoskeletal:  Negative for myalgias.   Skin:  Negative for rash.   Neurological:  Negative for dizziness.   Psychiatric/Behavioral:  Negative for agitation.    All other systems reviewed and are negative.      Physical Exam  Physical Exam  Vitals and nursing note reviewed.   Constitutional:       General: He is not in acute distress.     Appearance: Normal appearance. He is not ill-appearing.   HENT:      Head: Normocephalic and atraumatic.      Right Ear: External ear normal.      Left Ear: External ear normal.      Nose: Nose normal. No congestion.      Mouth/Throat:      Mouth: Mucous membranes are moist.      Pharynx: Oropharynx is clear. No oropharyngeal exudate.   Eyes:      General:         Right eye: No discharge.         Left eye: No discharge.      Extraocular Movements: Extraocular movements intact.      Conjunctiva/sclera: Conjunctivae normal.      Pupils: Pupils are equal, round, and reactive to light.   Cardiovascular:      Rate and Rhythm: Normal rate and regular rhythm.      Pulses: Normal pulses.      Heart sounds: Normal heart sounds.   Pulmonary:      Effort: Pulmonary effort is normal. No respiratory distress.      Breath sounds: Normal breath sounds. No wheezing.   Abdominal:      General: Abdomen is flat. Bowel sounds are normal. There is no distension.      Palpations: Abdomen is soft.      Tenderness: There is no abdominal tenderness.   Musculoskeletal:         General: No swelling or deformity. Normal range of motion.      Cervical back: Normal range of motion. No rigidity.      Comments: No C, T, L-spine tenderness.  Old ecchymosis noted to the bilateral upper extremities but no pain with passive range of motion.  No pain with range of motion of the lower extremities.  Pelvis stable.   Skin:     General: Skin is  warm and dry.      Capillary Refill: Capillary refill takes less than 2 seconds.   Neurological:      General: No focal deficit present.      Mental Status: He is alert and oriented to person, place, and time. Mental status is at baseline.      Cranial Nerves: No cranial nerve deficit.      Motor: No weakness.      Gait: Gait normal.   Psychiatric:         Mood and Affect: Mood normal.         Behavior: Behavior normal.         Vital Signs  ED Triage Vitals [04/22/24 2202]   Temperature Pulse Respirations Blood Pressure SpO2   97.7 °F (36.5 °C) 91 18 142/75 93 %      Temp Source Heart Rate Source Patient Position - Orthostatic VS BP Location FiO2 (%)   Tympanic Monitor Lying Right arm --      Pain Score       2           Vitals:    04/22/24 2202 04/23/24 0102   BP: 142/75 100/60   Pulse: 91 87   Patient Position - Orthostatic VS: Lying Lying         Visual Acuity  Visual Acuity      Flowsheet Row Most Recent Value   L Pupil Size (mm) 3   R Pupil Size (mm) 3            ED Medications  Medications - No data to display    Diagnostic Studies  Results Reviewed       None                   CT head without contrast   Final Result by Jesús Pang MD (04/23 0022)      No acute intracranial abnormality.                  Workstation performed: [id number]         CT spine cervical without contrast   Final Result by Jesús Pang MD (04/23 0027)      No acute cervical spine fracture or traumatic malalignment.      No significant interval change when compared to a CT cervical spine dated April 16, 2024.               Workstation performed: [id number]                    Procedures  Procedures         ED Course                               SBIRT 22yo+      Flowsheet Row Most Recent Value   Initial Alcohol Screen: US AUDIT-C     1. How often do you have a drink containing alcohol? 6 Filed at: 04/22/2024 2200   2. How many drinks containing alcohol do you have on a typical day you are drinking?  5 Filed at: 04/22/2024  2200   3a. Male UNDER 65: How often do you have five or more drinks on one occasion? 6 Filed at: 04/22/2024 2200   3b. FEMALE Any Age, or MALE 65+: How often do you have 4 or more drinks on one occassion? 6 Filed at: 04/22/2024 2200   Audit-C Score 23 Filed at: 04/22/2024 2200   Full Alcohol Screen: US AUDIT    4. How often during the last year have you found that you were not able to stop drinking once you had started? 2 Filed at: 04/22/2024 2200   5. How often during past year have you failed to do what was normally expected of you because of drinking?  4 Filed at: 04/22/2024 2200   6. How often in past year have you needed a first drink in the morning to get yourself going after a heavy drinking session?  3 Filed at: 04/22/2024 2200   7. How often in past year have you had feeling of guilt or remorse after drinking?  3 Filed at: 04/22/2024 2200   8. How often in past year have you been unable to remember what happened night before because you had been drinking?  2 Filed at: 04/22/2024 2200   9. Have you or someone else been injured as a result of your drinking?  4 Filed at: 04/22/2024 2200   10. Has a relative, friend, doctor or other health worker been concerned about your drinking and suggested you cut down?  4 Filed at: 04/22/2024 2200   AUDIT Total Score 45 Filed at: 04/22/2024 2200   BRIGIDA: How many times in the past year have you...    Used an illegal drug or used a prescription medication for non-medical reasons? Never Filed at: 04/22/2024 2200                      Medical Decision Making  64-year-old male presenting to ED today for fall from standing.  Given he is on Eliquis we will do a CT head to evaluate for possible subdural.  Also do a CT C-spine.  If negative imaging workup will plan to discharge home.  Walker was ordered for patient to have at home.  Encouraged outpatient follow-up and patient was discharged home.  Return precautions discussed and patient was discharged home.    Amount and/or  Complexity of Data Reviewed  Radiology: ordered.             Disposition  Final diagnoses:   Fall, initial encounter   Closed head injury, initial encounter     Time reflects when diagnosis was documented in both MDM as applicable and the Disposition within this note       Time User Action Codes Description Comment    4/23/2024 12:56 AM Seven Severino Add [W19.XXXA] Fall, initial encounter     4/23/2024 12:57 AM Seven Severino Add [S09.90XA] Closed head injury, initial encounter           ED Disposition       ED Disposition   Discharge    Condition   Stable    Date/Time   Tue Apr 23, 2024 0056    Comment   Gregg Partida discharge to home/self care.                   Follow-up Information       Follow up With Specialties Details Why Contact Info Additional Information    [facility] Call  To schedule an appointment to establish care with a primary care provider [address]  [place] [address] [phone] [facility], [address], [place], [place], [address] [phone]    Critical access hospital Emergency Department Emergency Medicine Go to  If symptoms worsen, As needed [address] [id number] [phone] [facility] Emergency Department, [address], [place], [address]            Discharge Medication List as of 4/23/2024 12:58 AM        CONTINUE these medications which have NOT CHANGED    Details   albuterol (Ventolin HFA) 90 mcg/act inhaler Inhale 2 puffs every 4 (four) hours as needed for wheezing, Starting Tue 1/17/2023, Normal      aluminum-magnesium hydroxide-simethicone (MAALOX) 3242-2740-135 mg/30 mL suspension Take 30 mL by mouth every 4 (four) hours as needed for indigestion or heartburn, Starting Wed 2/14/2024, Normal      apixaban (ELIQUIS) 5 mg Take 1 tablet (5 mg total) by mouth 2 (two) times a day Do not start before January 31, 2024.,  Starting Wed 1/31/2024, Normal      aspirin (ECOTRIN LOW STRENGTH) 81 mg EC tablet Take 81 mg by mouth daily, Historical Med      Blood Glucose Monitoring Suppl (ONE TOUCH ULTRA MINI) w/Device KIT Use as directed, Starting Thu 5/16/2019, Historical Med      Blood Pressure Monitoring (Adult Blood Pressure Cuff Lg) KIT Use in the morning, Starting Thu 12/14/2023, Normal      Breo Ellipta 200-25 MCG/ACT inhaler Inhale 1 puff daily Rinse mouth after use., Starting Fri 6/9/2023, Normal      ferrous sulfate 325 (65 Fe) mg tablet Take 1 tablet (325 mg total) by mouth daily with breakfast, Starting Fri 6/2/2023, Normal      folic acid (FOLVITE) 1 mg tablet TAKE 1 TABLET DAILY, Normal      gabapentin (NEURONTIN) 300 mg capsule TAKE ONE CAPSULE THREE TIMES DAILY, Normal      lisinopril (ZESTRIL) 20 mg tablet Take 0.5 tablets (10 mg total) by mouth daily, Starting Thu 10/5/2023, Normal      magnesium Oxide (MAG-OX) 400 mg TABS Take 1 tablet (400 mg total) by mouth 2 (two) times a day, Starting Wed 1/31/2024, Normal      metFORMIN (GLUCOPHAGE) 500 mg tablet TAKE 1 TABLET DAILY WITH BREAKFAST, Starting Sun 4/7/2024, Normal      metoprolol tartrate (LOPRESSOR) 50 mg tablet Take 1 tablet (50 mg total) by mouth every 12 (twelve) hours, Starting Wed 2/14/2024, Normal      naltrexone (REVIA) 50 mg tablet Take 1 tablet (50 mg total) by mouth daily, Starting Fri 11/3/2023, Normal      nicotine (NICODERM CQ) 21 mg/24 hr TD 24 hr patch Place 1 patch on the skin over 24 hours daily Do not start before December 4, 2023., Starting Mon 12/4/2023, Normal      ONETOUCH DELICA LANCETS FINE MISC 3 (three) times a day Test, Starting Thu 5/16/2019, Historical Med      pantoprazole (PROTONIX) 40 mg tablet TAKE 1 TABLET TWO TIMES A DAY, Normal      pravastatin (PRAVACHOL) 40 mg tablet TAKE 1 TABLET DAILY WITH DINNER, Normal      ranolazine (RANEXA) 500 mg 12 hr tablet TAKE 1 TABLET EVERY 12 HOURS, Normal      senna-docusate sodium (SENOKOT S) 8.6-50  mg per tablet Take 1 tablet by mouth daily as needed for constipation, Starting Sat 1/27/2024, No Print      tamsulosin (FLOMAX) 0.4 mg TAKE 1 CAPSULE DAILY, Normal      Thiamine HCl (vitamin B-1) 100 MG TABS TAKE 1 TABLET DAILY, Normal      varenicline (CHANTIX) 1 mg tablet TAKE 1 TABLET 2 TIMES A DAY, Starting Tue 12/12/2023, Normal             No discharge procedures on file.    PDMP Review         Value Time User    PDMP Reviewed  Yes 1/24/2024  9:23 AM Tyrone Freire DO            ED Provider  Electronically Signed by             Seven Severino MD  04/23/24 [id number]

## 2024-05-06 ENCOUNTER — HOSPITAL ENCOUNTER (EMERGENCY)
Facility: HOSPITAL | Age: 64
Discharge: HOME | End: 2024-05-06
Attending: EMERGENCY MEDICINE
Payer: COMMERCIAL

## 2024-05-06 VITALS
RESPIRATION RATE: 16 BRPM | HEART RATE: 78 BPM | DIASTOLIC BLOOD PRESSURE: 90 MMHG | SYSTOLIC BLOOD PRESSURE: 147 MMHG | OXYGEN SATURATION: 92 % | TEMPERATURE: 98 F

## 2024-05-06 DIAGNOSIS — F10.929 ALCOHOL INTOXICATION (HCC): Primary | ICD-10-CM

## 2024-05-06 PROCEDURE — 99283 EMERGENCY DEPT VISIT LOW MDM: CPT | Performed by: EMERGENCY MEDICINE

## 2024-05-06 PROCEDURE — 99284 EMERGENCY DEPT VISIT MOD MDM: CPT

## 2024-05-06 NOTE — ED PROVIDER NOTES
History  Chief Complaint   Patient presents with    Alcohol Intoxication     Per BLS patient called 911 and requested to come to the ER for alcohol rehabilitation.  Patient states his last drink was right before coming to the hospital.     Pt is a 63yo M who presents for alcohol intoxication.  Patient reports he drinks a quart of vodka daily for years.  Patient states he is looking to get some help for his alcohol abuse.  Patient states he had half of the court today prior to arrival.  Patient states his last drink was immediately prior to arrival.  Patient denies any falls or trauma.  Patient denies any pain.  Patient denies any coingestions.  Patient does state that he has been off of his home medications for approximately 1 week.        Prior to Admission Medications   Prescriptions Last Dose Informant Patient Reported? Taking?   Blood Glucose Monitoring Suppl (ONE TOUCH ULTRA MINI) w/Device KIT  Self Yes No   Sig: Use as directed   Patient not taking: Reported on 3/25/2024   Blood Pressure Monitoring (Adult Blood Pressure Cuff Lg) KIT   No No   Sig: Use in the morning   Patient not taking: Reported on 3/25/2024   Breo Ellipta 200-25 MCG/ACT inhaler  Self No No   Sig: Inhale 1 puff daily Rinse mouth after use.   Patient not taking: Reported on 3/25/2024   ONETOUCH DELICA LANCETS FINE MISC  Self Yes No   Sig: 3 (three) times a day Test   Thiamine HCl (vitamin B-1) 100 MG TABS  Self No No   Sig: TAKE 1 TABLET DAILY   Patient not taking: Reported on 3/25/2024   albuterol (Ventolin HFA) 90 mcg/act inhaler  Self No No   Sig: Inhale 2 puffs every 4 (four) hours as needed for wheezing   Patient not taking: Reported on 3/25/2024   aluminum-magnesium hydroxide-simethicone (MAALOX) 7133-7801-708 mg/30 mL suspension   No No   Sig: Take 30 mL by mouth every 4 (four) hours as needed for indigestion or heartburn   Patient not taking: Reported on 3/25/2024   apixaban (ELIQUIS) 5 mg   No No   Sig: Take 1 tablet (5 mg total) by  mouth 2 (two) times a day Do not start before January 31, 2024.   aspirin (ECOTRIN LOW STRENGTH) 81 mg EC tablet  Self Yes No   Sig: Take 81 mg by mouth daily   ferrous sulfate 325 (65 Fe) mg tablet  Self No No   Sig: Take 1 tablet (325 mg total) by mouth daily with breakfast   folic acid (FOLVITE) 1 mg tablet   No No   Sig: TAKE 1 TABLET DAILY   gabapentin (NEURONTIN) 300 mg capsule   No No   Sig: TAKE ONE CAPSULE THREE TIMES DAILY   lisinopril (ZESTRIL) 20 mg tablet  Self No No   Sig: Take 0.5 tablets (10 mg total) by mouth daily   magnesium Oxide (MAG-OX) 400 mg TABS   No No   Sig: Take 1 tablet (400 mg total) by mouth 2 (two) times a day   metFORMIN (GLUCOPHAGE) 500 mg tablet   No No   Sig: TAKE 1 TABLET DAILY WITH BREAKFAST   metoprolol tartrate (LOPRESSOR) 50 mg tablet   No No   Sig: Take 1 tablet (50 mg total) by mouth every 12 (twelve) hours   naltrexone (REVIA) 50 mg tablet  Self No No   Sig: Take 1 tablet (50 mg total) by mouth daily   Patient not taking: Reported on 3/25/2024   nicotine (NICODERM CQ) 21 mg/24 hr TD 24 hr patch  Self No No   Sig: Place 1 patch on the skin over 24 hours daily Do not start before December 4, 2023.   Patient not taking: Reported on 3/25/2024   pantoprazole (PROTONIX) 40 mg tablet   No No   Sig: TAKE 1 TABLET TWO TIMES A DAY   pravastatin (PRAVACHOL) 40 mg tablet  Self No No   Sig: TAKE 1 TABLET DAILY WITH DINNER   ranolazine (RANEXA) 500 mg 12 hr tablet   No No   Sig: TAKE 1 TABLET EVERY 12 HOURS   senna-docusate sodium (SENOKOT S) 8.6-50 mg per tablet   No No   Sig: Take 1 tablet by mouth daily as needed for constipation   Patient not taking: Reported on 3/25/2024   tamsulosin (FLOMAX) 0.4 mg   No No   Sig: TAKE 1 CAPSULE DAILY   varenicline (CHANTIX) 1 mg tablet   No No   Sig: TAKE 1 TABLET 2 TIMES A DAY   Patient not taking: Reported on 3/25/2024      Facility-Administered Medications: None       Past Medical History:   Diagnosis Date    Acute on chronic kidney failure   (HCC)     Alcohol withdrawal (HCC) 06/07/2019    Atrial fibrillation (HCC)     Cancer (HCC)     prostate ca,had radiation    Cardiac disease     stents,then triple bypass    COPD (chronic obstructive pulmonary disease) (HCC)     Coronary artery disease     ETOH abuse     Heart failure (HCC)     History of heart surgery     says triple bypass [facility]    Hx of heart artery stent     2014    Hyperlipidemia     Hypertension     Hypovolemic shock (HCC) 12/22/2019    Lumbar spondylitis (HCC) 10/13/2022    Nasal bone fracture 10/10/2022    Prostate CA (HCC)     S/P CABG x 3     2004    Sleep apnea        Past Surgical History:   Procedure Laterality Date    CARDIAC CATHETERIZATION      2 stents    CORONARY ARTERY BYPASS GRAFT  2004    NE ARTHRD ANT INTERBODY MIN DSC CRV BELOW C2 N/A 12/16/2020    Procedure: Anterior cervical discectomy with fusion C4-C7; Posterior cervical decompression and fusion C2-T2;  Surgeon: David Rowell MD;  Location: [facility];  Service: Neurosurgery    TONSILLECTOMY         Family History   Problem Relation Age of Onset    Diabetes Mother     Uterine cancer Mother     COPD Father     Hypertension Father      I have reviewed and agree with the history as documented.    E-Cigarette/Vaping    E-Cigarette Use Never User      E-Cigarette/Vaping Substances    Nicotine Yes     THC No     CBD No     Flavoring No     Other No     Unknown No      Social History     Tobacco Use    Smoking status: Every Day     Current packs/day: 1.50     Average packs/day: 1.5 packs/day for 40.0 years (60.0 ttl pk-yrs)     Types: Cigarettes    Smokeless tobacco: Never   Vaping Use    Vaping status: Never Used   Substance Use Topics    Alcohol use: Yes     Alcohol/week: 4.0 standard drinks of alcohol     Types: 4 Standard drinks or equivalent per week     Comment: quart of vodka daily    Drug use: No       Physical Exam  Physical Exam  Vitals reviewed.   Constitutional:       Appearance: He is well-developed. He is  not toxic-appearing or diaphoretic.   HENT:      Head: Normocephalic and atraumatic.      Right Ear: External ear normal.      Left Ear: External ear normal.      Nose: Nose normal.      Mouth/Throat:      Pharynx: Oropharynx is clear.   Eyes:      Extraocular Movements: Extraocular movements intact.      Pupils: Pupils are equal, round, and reactive to light.   Cardiovascular:      Rate and Rhythm: Normal rate and regular rhythm.      Heart sounds: Normal heart sounds.   Pulmonary:      Effort: Pulmonary effort is normal. No respiratory distress.      Breath sounds: Normal breath sounds.   Abdominal:      General: There is no distension.      Palpations: Abdomen is soft.      Tenderness: There is no abdominal tenderness.   Musculoskeletal:         General: Normal range of motion.      Cervical back: Normal range of motion and neck supple.   Skin:     General: Skin is warm and dry.      Capillary Refill: Capillary refill takes less than 2 seconds.   Neurological:      Mental Status: He is alert and oriented to person, place, and time.   Psychiatric:         Speech: Speech is slurred.         Behavior: Behavior is cooperative.         Vital Signs  ED Triage Vitals [05/06/24 1225]   Temperature Pulse Respirations Blood Pressure SpO2   98 °F (36.7 °C) 78 16 147/90 92 %      Temp Source Heart Rate Source Patient Position - Orthostatic VS BP Location FiO2 (%)   Oral Monitor Sitting Left arm --      Pain Score       No Pain           Vitals:    05/06/24 1225   BP: 147/90   Pulse: 78   Patient Position - Orthostatic VS: Sitting         Visual Acuity      ED Medications  Medications - No data to display    Diagnostic Studies  Results Reviewed       Procedure Component Value Units Date/Time    POCT alcohol breath test [[id number]]     Lab Status: No result                    No orders to display              Procedures  Procedures         ED Course  ED Course as of 05/06/24 1431   Mon May 06, 2024   1228 Pt declined SH but is  agreeable to OORP. Will place warm handoff.    1358 Pt getting agitated and requesting to leave. Discussed with pt that as he is intoxicated, he needs a sober ride to get him if he wants to leave. Pt to attempt to call for a ride.    1410 Pt threatening to 'knock them out' (in reference to security who are at bedside). Will breathalyze to determine when pt suitable to leave.   1423 Breathalyzer 0.081. As pt at the legal limit, will allow to leave with taxi.                                              Medical Decision Making  Pt is a 65yo M who presents with alcohol intoxication.     Will plan for OORP evaluation. See ED course for details.    Plan to discharge pt with f/u to PCP and [facility]. Discussed returning the ED with new or worsening of symptoms. Discussed continuing home medications as prescribed. Pt expressed understanding of discharge instructions, return precautions, and medication instructions and is stable for discharge at this time. All questions were answered and pt was discharged without incident.       Amount and/or Complexity of Data Reviewed  Labs: ordered.             Disposition  Final diagnoses:   Alcohol intoxication (HCC)     Time reflects when diagnosis was documented in both MDM as applicable and the Disposition within this note       Time User Action Codes Description Comment    5/6/2024  2:24 PM Mili Patel Add [F10.929] Alcohol intoxication (HCC)           ED Disposition       ED Disposition   Discharge    Condition   Stable    Date/Time   Mon May 6, 2024  2:24 PM    Comment   Gregg Partida discharge to home/self care.                   Follow-up Information       Follow up With Specialties Details Why Contact Info    [facility]  Call  As needed [phone]              Discharge Medication List as of 5/6/2024  2:24 PM        CONTINUE these medications which have NOT CHANGED    Details   albuterol (Ventolin HFA) 90 mcg/act inhaler Inhale 2 puffs every 4 (four) hours as needed for  wheezing, Starting Tue 1/17/2023, Normal      aluminum-magnesium hydroxide-simethicone (MAALOX) 5829-6475-291 mg/30 mL suspension Take 30 mL by mouth every 4 (four) hours as needed for indigestion or heartburn, Starting Wed 2/14/2024, Normal      apixaban (ELIQUIS) 5 mg Take 1 tablet (5 mg total) by mouth 2 (two) times a day Do not start before January 31, 2024., Starting Wed 1/31/2024, Normal      aspirin (ECOTRIN LOW STRENGTH) 81 mg EC tablet Take 81 mg by mouth daily, Historical Med      Blood Glucose Monitoring Suppl (ONE TOUCH ULTRA MINI) w/Device KIT Use as directed, Starting Thu 5/16/2019, Historical Med      Blood Pressure Monitoring (Adult Blood Pressure Cuff Lg) KIT Use in the morning, Starting Thu 12/14/2023, Normal      Breo Ellipta 200-25 MCG/ACT inhaler Inhale 1 puff daily Rinse mouth after use., Starting Fri 6/9/2023, Normal      ferrous sulfate 325 (65 Fe) mg tablet Take 1 tablet (325 mg total) by mouth daily with breakfast, Starting Fri 6/2/2023, Normal      folic acid (FOLVITE) 1 mg tablet TAKE 1 TABLET DAILY, Normal      gabapentin (NEURONTIN) 300 mg capsule TAKE ONE CAPSULE THREE TIMES DAILY, Normal      lisinopril (ZESTRIL) 20 mg tablet Take 0.5 tablets (10 mg total) by mouth daily, Starting Thu 10/5/2023, Normal      magnesium Oxide (MAG-OX) 400 mg TABS Take 1 tablet (400 mg total) by mouth 2 (two) times a day, Starting Wed 1/31/2024, Normal      metFORMIN (GLUCOPHAGE) 500 mg tablet TAKE 1 TABLET DAILY WITH BREAKFAST, Starting Sun 4/7/2024, Normal      metoprolol tartrate (LOPRESSOR) 50 mg tablet Take 1 tablet (50 mg total) by mouth every 12 (twelve) hours, Starting Wed 2/14/2024, Normal      naltrexone (REVIA) 50 mg tablet Take 1 tablet (50 mg total) by mouth daily, Starting Fri 11/3/2023, Normal      nicotine (NICODERM CQ) 21 mg/24 hr TD 24 hr patch Place 1 patch on the skin over 24 hours daily Do not start before December 4, 2023., Starting Mon 12/4/2023, Normal      [facility]  FINE MISC 3 (three) times a day Test, Starting Thu 5/16/2019, Historical Med      pantoprazole (PROTONIX) 40 mg tablet TAKE 1 TABLET TWO TIMES A DAY, Normal      pravastatin (PRAVACHOL) 40 mg tablet TAKE 1 TABLET DAILY WITH DINNER, Normal      ranolazine (RANEXA) 500 mg 12 hr tablet TAKE 1 TABLET EVERY 12 HOURS, Normal      senna-docusate sodium (SENOKOT S) 8.6-50 mg per tablet Take 1 tablet by mouth daily as needed for constipation, Starting Sat 1/27/2024, No Print      tamsulosin (FLOMAX) 0.4 mg TAKE 1 CAPSULE DAILY, Normal      Thiamine HCl (vitamin B-1) 100 MG TABS TAKE 1 TABLET DAILY, Normal      varenicline (CHANTIX) 1 mg tablet TAKE 1 TABLET 2 TIMES A DAY, Starting Tue 12/12/2023, Normal             No discharge procedures on file.    PDMP Review         Value Time User    PDMP Reviewed  Yes 1/24/2024  9:23 AM Tyrone Freire DO            ED Provider  Electronically Signed by             Mili Patel MD  05/06/24 0256

## 2024-05-06 NOTE — ED NOTES
"5/6/24 @ 1415:  Patient continuously cursing at staff and being uncooperative; finally asked to leave AMA.  ED staff completed breathalyzer, which he initially refused and it was .081, so patient was discharged.      PES called Nan from [facility] to cancel referral.  PES informed patient that Nan will reach out to him and he said, \"I have her number; I will call her when I get home.\"      MS Jay  "

## 2024-05-06 NOTE — DISCHARGE INSTRUCTIONS
Follow-up with primary care for further care. Contact info provided below if needed.  Decrease your alcohol use.   Return to the ED with new or worsening symptoms.

## 2024-05-07 NOTE — ED CARE HANDOFF
[facility] Warm Handoff Outcome Note    Patient name Gregg Partida  Location ED HW1/ED HW1 MRN [id number]  Age: 64 y.o.          Plan Type:  Warm Handoff                                                                                    Plan Date: 5/6/2024  Service:  ED Warm Handoff      Substance Use History:  ETOH    Warm Handoff Update:  Cl declined placement at [facility]. CL discharged home    Warm Handoff Outcome: Non-Treatment Related Resources

## 2024-05-10 ENCOUNTER — HOSPITAL ENCOUNTER (INPATIENT)
Facility: HOSPITAL | Age: 64
LOS: 2 days | Discharge: LEFT BEFORE DISCHARGE | DRG: 314 | End: 2024-05-13
Attending: EMERGENCY MEDICINE | Admitting: INTERNAL MEDICINE
Payer: COMMERCIAL

## 2024-05-10 DIAGNOSIS — I95.9 HYPOTENSION: ICD-10-CM

## 2024-05-10 DIAGNOSIS — R53.1 WEAKNESS: Primary | ICD-10-CM

## 2024-05-10 DIAGNOSIS — F10.929 ALCOHOL INTOXICATION (HCC): ICD-10-CM

## 2024-05-10 LAB
ALBUMIN SERPL BCP-MCNC: 4.2 G/DL (ref 3.5–5)
ALP SERPL-CCNC: 99 U/L (ref 34–104)
ALT SERPL W P-5'-P-CCNC: 74 U/L (ref 7–52)
ANION GAP SERPL CALCULATED.3IONS-SCNC: 16 MMOL/L (ref 4–13)
AST SERPL W P-5'-P-CCNC: 137 U/L (ref 13–39)
ATRIAL RATE: 114 BPM
BASOPHILS # BLD AUTO: 0.18 THOUSANDS/ÂΜL (ref 0–0.1)
BASOPHILS NFR BLD AUTO: 3 % (ref 0–1)
BILIRUB SERPL-MCNC: 0.62 MG/DL (ref 0.2–1)
BUN SERPL-MCNC: 21 MG/DL (ref 5–25)
CALCIUM SERPL-MCNC: 8.6 MG/DL (ref 8.4–10.2)
CHLORIDE SERPL-SCNC: 100 MMOL/L (ref 96–108)
CO2 SERPL-SCNC: 23 MMOL/L (ref 21–32)
CREAT SERPL-MCNC: 1.11 MG/DL (ref 0.6–1.3)
EOSINOPHIL # BLD AUTO: 0.03 THOUSAND/ÂΜL (ref 0–0.61)
EOSINOPHIL NFR BLD AUTO: 1 % (ref 0–6)
ERYTHROCYTE [DISTWIDTH] IN BLOOD BY AUTOMATED COUNT: 14.7 % (ref 11.6–15.1)
GFR SERPL CREATININE-BSD FRML MDRD: 70 ML/MIN/1.73SQ M
GLUCOSE SERPL-MCNC: 86 MG/DL (ref 65–140)
GLUCOSE SERPL-MCNC: 94 MG/DL (ref 65–140)
HCT VFR BLD AUTO: 40.6 % (ref 36.5–49.3)
HGB BLD-MCNC: 14 G/DL (ref 12–17)
IMM GRANULOCYTES # BLD AUTO: 0.02 THOUSAND/UL (ref 0–0.2)
IMM GRANULOCYTES NFR BLD AUTO: 0 % (ref 0–2)
LIPASE SERPL-CCNC: 85 U/L (ref 11–82)
LYMPHOCYTES # BLD AUTO: 1.53 THOUSANDS/ÂΜL (ref 0.6–4.47)
LYMPHOCYTES NFR BLD AUTO: 24 % (ref 14–44)
MAGNESIUM SERPL-MCNC: 1.3 MG/DL (ref 1.9–2.7)
MCH RBC QN AUTO: 34.1 PG (ref 26.8–34.3)
MCHC RBC AUTO-ENTMCNC: 34.5 G/DL (ref 31.4–37.4)
MCV RBC AUTO: 99 FL (ref 82–98)
MONOCYTES # BLD AUTO: 0.6 THOUSAND/ÂΜL (ref 0.17–1.22)
MONOCYTES NFR BLD AUTO: 10 % (ref 4–12)
NEUTROPHILS # BLD AUTO: 3.94 THOUSANDS/ÂΜL (ref 1.85–7.62)
NEUTS SEG NFR BLD AUTO: 62 % (ref 43–75)
NRBC BLD AUTO-RTO: 0 /100 WBCS
P AXIS: 80 DEGREES
PLATELET # BLD AUTO: 107 THOUSANDS/UL (ref 149–390)
PMV BLD AUTO: 10 FL (ref 8.9–12.7)
POTASSIUM SERPL-SCNC: 5 MMOL/L (ref 3.5–5.3)
PR INTERVAL: 142 MS
PROT SERPL-MCNC: 7.8 G/DL (ref 6.4–8.4)
QRS AXIS: 101 DEGREES
QRSD INTERVAL: 86 MS
QT INTERVAL: 326 MS
QTC INTERVAL: 449 MS
RBC # BLD AUTO: 4.11 MILLION/UL (ref 3.88–5.62)
SODIUM SERPL-SCNC: 139 MMOL/L (ref 135–147)
T WAVE AXIS: 79 DEGREES
VENTRICULAR RATE: 114 BPM
WBC # BLD AUTO: 6.3 THOUSAND/UL (ref 4.31–10.16)

## 2024-05-10 PROCEDURE — 93005 ELECTROCARDIOGRAM TRACING: CPT

## 2024-05-10 PROCEDURE — 82948 REAGENT STRIP/BLOOD GLUCOSE: CPT

## 2024-05-10 PROCEDURE — 96365 THER/PROPH/DIAG IV INF INIT: CPT

## 2024-05-10 PROCEDURE — 36415 COLL VENOUS BLD VENIPUNCTURE: CPT | Performed by: STUDENT IN AN ORGANIZED HEALTH CARE EDUCATION/TRAINING PROGRAM

## 2024-05-10 PROCEDURE — 80179 DRUG ASSAY SALICYLATE: CPT

## 2024-05-10 PROCEDURE — 99284 EMERGENCY DEPT VISIT MOD MDM: CPT

## 2024-05-10 PROCEDURE — 83690 ASSAY OF LIPASE: CPT | Performed by: EMERGENCY MEDICINE

## 2024-05-10 PROCEDURE — 87040 BLOOD CULTURE FOR BACTERIA: CPT | Performed by: STUDENT IN AN ORGANIZED HEALTH CARE EDUCATION/TRAINING PROGRAM

## 2024-05-10 PROCEDURE — 85025 COMPLETE CBC W/AUTO DIFF WBC: CPT | Performed by: EMERGENCY MEDICINE

## 2024-05-10 PROCEDURE — 80143 DRUG ASSAY ACETAMINOPHEN: CPT

## 2024-05-10 PROCEDURE — 82077 ASSAY SPEC XCP UR&BREATH IA: CPT

## 2024-05-10 PROCEDURE — 80053 COMPREHEN METABOLIC PANEL: CPT | Performed by: EMERGENCY MEDICINE

## 2024-05-10 PROCEDURE — 99285 EMERGENCY DEPT VISIT HI MDM: CPT | Performed by: EMERGENCY MEDICINE

## 2024-05-10 PROCEDURE — 96361 HYDRATE IV INFUSION ADD-ON: CPT

## 2024-05-10 PROCEDURE — 83735 ASSAY OF MAGNESIUM: CPT | Performed by: EMERGENCY MEDICINE

## 2024-05-10 PROCEDURE — 96375 TX/PRO/DX INJ NEW DRUG ADDON: CPT

## 2024-05-10 PROCEDURE — 83605 ASSAY OF LACTIC ACID: CPT | Performed by: STUDENT IN AN ORGANIZED HEALTH CARE EDUCATION/TRAINING PROGRAM

## 2024-05-10 RX ORDER — RANOLAZINE 500 MG/1
500 TABLET, EXTENDED RELEASE ORAL ONCE
Status: COMPLETED | OUTPATIENT
Start: 2024-05-10 | End: 2024-05-10

## 2024-05-10 RX ORDER — LISINOPRIL 20 MG/1
20 TABLET ORAL ONCE
Status: COMPLETED | OUTPATIENT
Start: 2024-05-10 | End: 2024-05-10

## 2024-05-10 RX ORDER — METOPROLOL TARTRATE 50 MG/1
50 TABLET, FILM COATED ORAL ONCE
Status: COMPLETED | OUTPATIENT
Start: 2024-05-10 | End: 2024-05-10

## 2024-05-10 RX ORDER — MAGNESIUM SULFATE HEPTAHYDRATE 40 MG/ML
2 INJECTION, SOLUTION INTRAVENOUS ONCE
Status: COMPLETED | OUTPATIENT
Start: 2024-05-10 | End: 2024-05-10

## 2024-05-10 RX ORDER — PRAVASTATIN SODIUM 40 MG
40 TABLET ORAL ONCE
Status: COMPLETED | OUTPATIENT
Start: 2024-05-10 | End: 2024-05-10

## 2024-05-10 RX ORDER — MAGNESIUM SULFATE HEPTAHYDRATE 40 MG/ML
4 INJECTION, SOLUTION INTRAVENOUS ONCE
Status: COMPLETED | OUTPATIENT
Start: 2024-05-10 | End: 2024-05-11

## 2024-05-10 RX ORDER — ONDANSETRON 2 MG/ML
4 INJECTION INTRAMUSCULAR; INTRAVENOUS ONCE
Status: COMPLETED | OUTPATIENT
Start: 2024-05-10 | End: 2024-05-10

## 2024-05-10 RX ORDER — ASPIRIN 81 MG/1
324 TABLET, CHEWABLE ORAL ONCE
Status: COMPLETED | OUTPATIENT
Start: 2024-05-10 | End: 2024-05-10

## 2024-05-10 RX ORDER — GABAPENTIN 300 MG/1
300 CAPSULE ORAL ONCE
Status: COMPLETED | OUTPATIENT
Start: 2024-05-10 | End: 2024-05-10

## 2024-05-10 RX ADMIN — ASPIRIN 81 MG CHEWABLE TABLET 324 MG: 81 TABLET CHEWABLE at 15:29

## 2024-05-10 RX ADMIN — MAGNESIUM SULFATE HEPTAHYDRATE 2 G: 40 INJECTION, SOLUTION INTRAVENOUS at 15:56

## 2024-05-10 RX ADMIN — MAGNESIUM SULFATE HEPTAHYDRATE 4 G: 40 INJECTION, SOLUTION INTRAVENOUS at 23:55

## 2024-05-10 RX ADMIN — METOPROLOL TARTRATE 50 MG: 50 TABLET, FILM COATED ORAL at 15:30

## 2024-05-10 RX ADMIN — GABAPENTIN 300 MG: 300 CAPSULE ORAL at 15:30

## 2024-05-10 RX ADMIN — PRAVASTATIN SODIUM 40 MG: 40 TABLET ORAL at 15:31

## 2024-05-10 RX ADMIN — SODIUM CHLORIDE 1000 ML: 0.9 INJECTION, SOLUTION INTRAVENOUS at 17:39

## 2024-05-10 RX ADMIN — SODIUM CHLORIDE 1000 ML: 0.9 INJECTION, SOLUTION INTRAVENOUS at 22:43

## 2024-05-10 RX ADMIN — SODIUM CHLORIDE 1000 ML: 0.9 INJECTION, SOLUTION INTRAVENOUS at 14:46

## 2024-05-10 RX ADMIN — RANOLAZINE 500 MG: 500 TABLET, FILM COATED, EXTENDED RELEASE ORAL at 15:30

## 2024-05-10 RX ADMIN — ONDANSETRON 4 MG: 2 INJECTION INTRAMUSCULAR; INTRAVENOUS at 14:46

## 2024-05-10 RX ADMIN — LISINOPRIL 20 MG: 20 TABLET ORAL at 15:30

## 2024-05-10 RX ADMIN — APIXABAN 5 MG: 5 TABLET, FILM COATED ORAL at 15:30

## 2024-05-10 RX ADMIN — METFORMIN HYDROCHLORIDE 500 MG: 500 TABLET ORAL at 15:30

## 2024-05-10 NOTE — ED NOTES
PT , but decreases to 110 without intervention.  Additional EKG completed per MD.  VS as noted.       Comfort Palm RN  05/10/24 2974

## 2024-05-10 NOTE — ED PROVIDER NOTES
"History  Chief Complaint   Patient presents with    Alcohol Intoxication     Pt brought in by ambulance after pt was reportedly found by police on a picnic table outside of resident at [facility].  Pt reports coming to the hospital \"To get Sober\".  Pt denies any chest pain, SOb or other discomfort.  Pt reports drinking Vodka today up to 30 minutes ago, and has been on a binge for the last 2 weeks.       64-year-old male well-known to the ED for presents with being sober he has a history of alcohol intoxication he denies any other illicit drug use or falls or trauma.  Denies any medical complaints at this time of chest pain shortness of breath nausea vomiting abdominal pain.  He says he has not been compliant with his medications he has a history of CAD and CABG.      History provided by:  Patient   used: No        Prior to Admission Medications   Prescriptions Last Dose Informant Patient Reported? Taking?   Blood Glucose Monitoring Suppl (ONE TOUCH ULTRA MINI) w/Device KIT  Self Yes No   Sig: Use as directed   Patient not taking: Reported on 3/25/2024   Blood Pressure Monitoring (Adult Blood Pressure Cuff Lg) KIT   No No   Sig: Use in the morning   Patient not taking: Reported on 3/25/2024   Breo Ellipta 200-25 MCG/ACT inhaler  Self No No   Sig: Inhale 1 puff daily Rinse mouth after use.   Patient not taking: Reported on 3/25/2024   ONETOUCH DELICA LANCETS FINE MISC  Self Yes No   Sig: 3 (three) times a day Test   Thiamine HCl (vitamin B-1) 100 MG TABS  Self No No   Sig: TAKE 1 TABLET DAILY   Patient not taking: Reported on 3/25/2024   albuterol (Ventolin HFA) 90 mcg/act inhaler  Self No No   Sig: Inhale 2 puffs every 4 (four) hours as needed for wheezing   Patient not taking: Reported on 3/25/2024   aluminum-magnesium hydroxide-simethicone (MAALOX) 3633-4773-002 mg/30 mL suspension   No No   Sig: Take 30 mL by mouth every 4 (four) hours as needed for indigestion or heartburn   Patient " not taking: Reported on 3/25/2024   apixaban (ELIQUIS) 5 mg   No No   Sig: Take 1 tablet (5 mg total) by mouth 2 (two) times a day Do not start before January 31, 2024.   aspirin (ECOTRIN LOW STRENGTH) 81 mg EC tablet  Self Yes No   Sig: Take 81 mg by mouth daily   ferrous sulfate 325 (65 Fe) mg tablet  Self No No   Sig: Take 1 tablet (325 mg total) by mouth daily with breakfast   folic acid (FOLVITE) 1 mg tablet   No No   Sig: TAKE 1 TABLET DAILY   gabapentin (NEURONTIN) 300 mg capsule   No No   Sig: TAKE ONE CAPSULE THREE TIMES DAILY   lisinopril (ZESTRIL) 20 mg tablet  Self No No   Sig: Take 0.5 tablets (10 mg total) by mouth daily   magnesium Oxide (MAG-OX) 400 mg TABS   No No   Sig: Take 1 tablet (400 mg total) by mouth 2 (two) times a day   metFORMIN (GLUCOPHAGE) 500 mg tablet   No No   Sig: TAKE 1 TABLET DAILY WITH BREAKFAST   metoprolol tartrate (LOPRESSOR) 50 mg tablet   No No   Sig: Take 1 tablet (50 mg total) by mouth every 12 (twelve) hours   naltrexone (REVIA) 50 mg tablet  Self No No   Sig: Take 1 tablet (50 mg total) by mouth daily   Patient not taking: Reported on 3/25/2024   nicotine (NICODERM CQ) 21 mg/24 hr TD 24 hr patch  Self No No   Sig: Place 1 patch on the skin over 24 hours daily Do not start before December 4, 2023.   Patient not taking: Reported on 3/25/2024   pantoprazole (PROTONIX) 40 mg tablet   No No   Sig: TAKE 1 TABLET TWO TIMES A DAY   pravastatin (PRAVACHOL) 40 mg tablet  Self No No   Sig: TAKE 1 TABLET DAILY WITH DINNER   ranolazine (RANEXA) 500 mg 12 hr tablet   No No   Sig: TAKE 1 TABLET EVERY 12 HOURS   senna-docusate sodium (SENOKOT S) 8.6-50 mg per tablet   No No   Sig: Take 1 tablet by mouth daily as needed for constipation   Patient not taking: Reported on 3/25/2024   tamsulosin (FLOMAX) 0.4 mg   No No   Sig: TAKE 1 CAPSULE DAILY   varenicline (CHANTIX) 1 mg tablet   No No   Sig: TAKE 1 TABLET 2 TIMES A DAY   Patient not taking: Reported on 3/25/2024       Facility-Administered Medications: None       Past Medical History:   Diagnosis Date    Acute on chronic kidney failure  (HCC)     Alcohol withdrawal (HCC) 06/07/2019    Atrial fibrillation (HCC)     Cancer (HCC)     prostate ca,had radiation    Cardiac disease     stents,then triple bypass    COPD (chronic obstructive pulmonary disease) (HCC)     Coronary artery disease     ETOH abuse     Heart failure (HCC)     History of heart surgery     says triple bypass [facility]    Hx of heart artery stent     2014    Hyperlipidemia     Hypertension     Hypovolemic shock ([facility]) 12/22/2019    Lumbar spondylitis ([facility]) 10/13/2022    Nasal bone fracture 10/10/2022    Prostate CA (HCC)     S/P CABG x 3     2004    Sleep apnea        Past Surgical History:   Procedure Laterality Date    CARDIAC CATHETERIZATION      2 stents    CORONARY ARTERY BYPASS GRAFT  2004    ND ARTHRD ANT INTERBODY MIN DSC CRV BELOW C2 N/A 12/16/2020    Procedure: Anterior cervical discectomy with fusion C4-C7; Posterior cervical decompression and fusion C2-T2;  Surgeon: David Rowell MD;  Location: [facility];  Service: Neurosurgery    TONSILLECTOMY         Family History   Problem Relation Age of Onset    Diabetes Mother     Uterine cancer Mother     COPD Father     Hypertension Father      I have reviewed and agree with the history as documented.    E-Cigarette/Vaping    E-Cigarette Use Never User      E-Cigarette/Vaping Substances    Nicotine Yes     THC No     CBD No     Flavoring No     Other No     Unknown No      Social History     Tobacco Use    Smoking status: Every Day     Current packs/day: 1.50     Average packs/day: 1.5 packs/day for 40.0 years (60.0 ttl pk-yrs)     Types: Cigarettes    Smokeless tobacco: Never   Vaping Use    Vaping status: Never Used   Substance Use Topics    Alcohol use: Yes     Alcohol/week: 4.0 standard drinks of alcohol     Types: 4 Standard drinks or equivalent per week     Comment: quart of vodka daily    Drug  use: No       Review of Systems   Constitutional: Negative.    HENT: Negative.     Eyes: Negative.    Respiratory: Negative.     Cardiovascular:  Positive for chest pain.   Gastrointestinal: Negative.    Endocrine: Negative.    Genitourinary: Negative.    Musculoskeletal: Negative.    Skin: Negative.    Allergic/Immunologic: Negative.    Neurological: Negative.    Hematological: Negative.    Psychiatric/Behavioral: Negative.     All other systems reviewed and are negative.      Physical Exam  Physical Exam  Vitals and nursing note reviewed.   Constitutional:       Appearance: Normal appearance.   HENT:      Head: Normocephalic and atraumatic.      Nose: Nose normal.      Mouth/Throat:      Mouth: Mucous membranes are moist.   Eyes:      Extraocular Movements: Extraocular movements intact.      Pupils: Pupils are equal, round, and reactive to light.   Cardiovascular:      Rate and Rhythm: Normal rate and regular rhythm.   Pulmonary:      Effort: Pulmonary effort is normal.      Breath sounds: Normal breath sounds.   Abdominal:      General: Abdomen is flat. Bowel sounds are normal.      Palpations: Abdomen is soft.   Musculoskeletal:         General: Normal range of motion.      Cervical back: Normal range of motion and neck supple.   Skin:     General: Skin is warm.      Capillary Refill: Capillary refill takes less than 2 seconds.   Neurological:      General: No focal deficit present.      Mental Status: He is alert and oriented to person, place, and time. Mental status is at baseline.   Psychiatric:         Mood and Affect: Mood normal.         Thought Content: Thought content normal.         Vital Signs  ED Triage Vitals   Temperature Pulse Respirations Blood Pressure SpO2   05/10/24 1800 05/10/24 1425 05/10/24 1425 05/10/24 1425 05/10/24 1425   97.9 °F (36.6 °C) (!) 106 20 (!) 176/96 96 %      Temp Source Heart Rate Source Patient Position - Orthostatic VS BP Location FiO2 (%)   05/10/24 1425 05/10/24 1425  05/10/24 1425 05/10/24 1425 --   Tympanic Monitor Lying Right arm       Pain Score       05/10/24 1425       No Pain           Vitals:    05/10/24 1515 05/10/24 1630 05/10/24 1800 05/10/24 1815   BP: (!) 195/103 (!) 82/46 116/61    Pulse:  84 80 70   Patient Position - Orthostatic VS: Lying  Lying          Visual Acuity      ED Medications  Medications   sodium chloride 0.9 % bolus 1,000 mL (0 mL Intravenous Stopped 5/10/24 1739)   ondansetron (ZOFRAN) injection 4 mg (4 mg Intravenous Given 5/10/24 1446)   apixaban (ELIQUIS) tablet 5 mg (5 mg Oral Given 5/10/24 1530)   aspirin chewable tablet 324 mg (324 mg Oral Given 5/10/24 1529)   gabapentin (NEURONTIN) capsule 300 mg (300 mg Oral Given 5/10/24 1530)   lisinopril (ZESTRIL) tablet 20 mg (20 mg Oral Given 5/10/24 1530)   metFORMIN (GLUCOPHAGE) tablet 500 mg (500 mg Oral Given 5/10/24 1530)   metoprolol tartrate (LOPRESSOR) tablet 50 mg (50 mg Oral Given 5/10/24 1530)   pravastatin (PRAVACHOL) tablet 40 mg (40 mg Oral Given 5/10/24 1531)   ranolazine (RANEXA) 12 hr tablet 500 mg (500 mg Oral Given 5/10/24 1530)   magnesium sulfate 2 g/50 mL IVPB (premix) 2 g (0 g Intravenous Stopped 5/10/24 1630)   sodium chloride 0.9 % bolus 1,000 mL (1,000 mL Intravenous New Bag 5/10/24 1739)       Diagnostic Studies  Results Reviewed       Procedure Component Value Units Date/Time    Fingerstick Glucose (POCT) [[id number]]  (Normal) Collected: 05/10/24 1746    Lab Status: Final result Specimen: Blood Updated: 05/10/24 1751     POC Glucose 94 mg/dl     Comprehensive metabolic panel [[id number]]  (Abnormal) Collected: 05/10/24 1445    Lab Status: Final result Specimen: Blood from Arm, Left Updated: 05/10/24 1535     Sodium 139 mmol/L      Potassium 5.0 mmol/L      Chloride 100 mmol/L      CO2 23 mmol/L      ANION GAP 16 mmol/L      BUN 21 mg/dL      Creatinine 1.11 mg/dL      Glucose 86 mg/dL      Calcium 8.6 mg/dL       U/L      ALT 74 U/L      Alkaline Phosphatase 99 U/L       Total Protein 7.8 g/dL      Albumin 4.2 g/dL      Total Bilirubin 0.62 mg/dL      eGFR 70 ml/min/1.73sq m     Narrative:      National Kidney Disease Foundation guidelines for Chronic Kidney Disease (CKD):     Stage 1 with normal or high GFR (GFR > 90 mL/min/1.73 square meters)    Stage 2 Mild CKD (GFR = 60-89 mL/min/1.73 square meters)    Stage 3A Moderate CKD (GFR = 45-59 mL/min/1.73 square meters)    Stage 3B Moderate CKD (GFR = 30-44 mL/min/1.73 square meters)    Stage 4 Severe CKD (GFR = 15-29 mL/min/1.73 square meters)    Stage 5 End Stage CKD (GFR <15 mL/min/1.73 square meters)  Note: GFR calculation is accurate only with a steady state creatinine    Lipase [[id number]]  (Abnormal) Collected: 05/10/24 1445    Lab Status: Final result Specimen: Blood from Arm, Left Updated: 05/10/24 1535     Lipase 85 u/L     Magnesium [[id number]]  (Abnormal) Collected: 05/10/24 1445    Lab Status: Final result Specimen: Blood from Arm, Left Updated: 05/10/24 1535     Magnesium 1.3 mg/dL     CBC and differential [[id number]]  (Abnormal) Collected: 05/10/24 1445    Lab Status: Final result Specimen: Blood from Arm, Left Updated: 05/10/24 1454     WBC 6.30 Thousand/uL      RBC 4.11 Million/uL      Hemoglobin 14.0 g/dL      Hematocrit 40.6 %      MCV 99 fL      MCH 34.1 pg      MCHC 34.5 g/dL      RDW 14.7 %      MPV 10.0 fL      Platelets 107 Thousands/uL      nRBC 0 /100 WBCs      Segmented % 62 %      Immature Grans % 0 %      Lymphocytes % 24 %      Monocytes % 10 %      Eosinophils Relative 1 %      Basophils Relative 3 %      Absolute Neutrophils 3.94 Thousands/µL      Absolute Immature Grans 0.02 Thousand/uL      Absolute Lymphocytes 1.53 Thousands/µL      Absolute Monocytes 0.60 Thousand/µL      Eosinophils Absolute 0.03 Thousand/µL      Basophils Absolute 0.18 Thousands/µL                    No orders to display              Procedures  Procedures         ED Course                               SBIRT 22yo+      Flowsheet  Row Most Recent Value   Initial Alcohol Screen: US AUDIT-C     1. How often do you have a drink containing alcohol? 0 Filed at: 05/10/2024 1430   Audit-C Score 0 Filed at: 05/10/2024 1430                      Medical Decision Making  Patient evaluated in the ED with labs given IV fluids and Zofran he ate some food he felt fine did give him all his medications from home as he skipped them and his blood pressure was high in the ED. interim his blood pressure dropped to 80s systolic I gave him IV fluids blood pressure improved he has known history of low blood pressures in the past.  Patient to be reassessed for clinical sobriety at which point he can be discharged. Care transitioned to dr ricketts.    Amount and/or Complexity of Data Reviewed  External Data Reviewed: notes.  Labs: ordered. Decision-making details documented in ED Course.    Risk  OTC drugs.  Prescription drug management.             Disposition  Final diagnoses:   Weakness     Time reflects when diagnosis was documented in both MDM as applicable and the Disposition within this note       Time User Action Codes Description Comment    5/10/2024  7:03 PM Trish Madden Add [R53.1] Weakness           ED Disposition       None          Follow-up Information    None         Patient's Medications   Discharge Prescriptions    No medications on file       No discharge procedures on file.    PDMP Review         Value Time User    PDMP Reviewed  Yes 1/24/2024  9:23 AM Tyrone Freire DO            ED Provider  Electronically Signed by             Trish Madden DO  05/10/24 1902

## 2024-05-11 ENCOUNTER — APPOINTMENT (OUTPATIENT)
Dept: RADIOLOGY | Facility: HOSPITAL | Age: 64
DRG: 314 | End: 2024-05-11
Payer: COMMERCIAL

## 2024-05-11 LAB
ALBUMIN SERPL BCP-MCNC: 3.3 G/DL (ref 3.5–5)
ALP SERPL-CCNC: 73 U/L (ref 34–104)
ALT SERPL W P-5'-P-CCNC: 61 U/L (ref 7–52)
ANION GAP SERPL CALCULATED.3IONS-SCNC: 11 MMOL/L (ref 4–13)
APAP SERPL-MCNC: <2 UG/ML (ref 10–20)
AST SERPL W P-5'-P-CCNC: 121 U/L (ref 13–39)
BACTERIA UR QL AUTO: ABNORMAL /HPF
BASOPHILS # BLD AUTO: 0.11 THOUSANDS/ÂΜL (ref 0–0.1)
BASOPHILS NFR BLD AUTO: 2 % (ref 0–1)
BILIRUB SERPL-MCNC: 0.53 MG/DL (ref 0.2–1)
BILIRUB UR QL STRIP: NEGATIVE
BUN SERPL-MCNC: 19 MG/DL (ref 5–25)
CALCIUM ALBUM COR SERPL-MCNC: 8.3 MG/DL (ref 8.3–10.1)
CALCIUM SERPL-MCNC: 7.7 MG/DL (ref 8.4–10.2)
CHLORIDE SERPL-SCNC: 106 MMOL/L (ref 96–108)
CLARITY UR: CLEAR
CO2 SERPL-SCNC: 21 MMOL/L (ref 21–32)
COLOR UR: YELLOW
CREAT SERPL-MCNC: 1.22 MG/DL (ref 0.6–1.3)
EOSINOPHIL # BLD AUTO: 0.11 THOUSAND/ÂΜL (ref 0–0.61)
EOSINOPHIL NFR BLD AUTO: 2 % (ref 0–6)
ERYTHROCYTE [DISTWIDTH] IN BLOOD BY AUTOMATED COUNT: 14.7 % (ref 11.6–15.1)
ETHANOL SERPL-MCNC: 174 MG/DL
GFR SERPL CREATININE-BSD FRML MDRD: 63 ML/MIN/1.73SQ M
GLUCOSE SERPL-MCNC: 120 MG/DL (ref 65–140)
GLUCOSE SERPL-MCNC: 121 MG/DL (ref 65–140)
GLUCOSE SERPL-MCNC: 137 MG/DL (ref 65–140)
GLUCOSE SERPL-MCNC: 79 MG/DL (ref 65–140)
GLUCOSE SERPL-MCNC: 81 MG/DL (ref 65–140)
GLUCOSE UR STRIP-MCNC: NEGATIVE MG/DL
HCT VFR BLD AUTO: 37 % (ref 36.5–49.3)
HGB BLD-MCNC: 11.8 G/DL (ref 12–17)
HGB UR QL STRIP.AUTO: NEGATIVE
HYALINE CASTS #/AREA URNS LPF: ABNORMAL /LPF
IMM GRANULOCYTES # BLD AUTO: 0.02 THOUSAND/UL (ref 0–0.2)
IMM GRANULOCYTES NFR BLD AUTO: 0 % (ref 0–2)
KETONES UR STRIP-MCNC: NEGATIVE MG/DL
LACTATE SERPL-SCNC: 1.9 MMOL/L (ref 0.5–2)
LEUKOCYTE ESTERASE UR QL STRIP: NEGATIVE
LYMPHOCYTES # BLD AUTO: 0.91 THOUSANDS/ÂΜL (ref 0.6–4.47)
LYMPHOCYTES NFR BLD AUTO: 17 % (ref 14–44)
MAGNESIUM SERPL-MCNC: 2.7 MG/DL (ref 1.9–2.7)
MCH RBC QN AUTO: 33 PG (ref 26.8–34.3)
MCHC RBC AUTO-ENTMCNC: 31.9 G/DL (ref 31.4–37.4)
MCV RBC AUTO: 103 FL (ref 82–98)
MONOCYTES # BLD AUTO: 0.49 THOUSAND/ÂΜL (ref 0.17–1.22)
MONOCYTES NFR BLD AUTO: 9 % (ref 4–12)
NEUTROPHILS # BLD AUTO: 3.65 THOUSANDS/ÂΜL (ref 1.85–7.62)
NEUTS SEG NFR BLD AUTO: 70 % (ref 43–75)
NITRITE UR QL STRIP: NEGATIVE
NON-SQ EPI CELLS URNS QL MICRO: ABNORMAL /HPF
NRBC BLD AUTO-RTO: 0 /100 WBCS
PH UR STRIP.AUTO: 5.5 [PH]
PLATELET # BLD AUTO: 85 THOUSANDS/UL (ref 149–390)
PLATELET BLD QL SMEAR: ABNORMAL
PMV BLD AUTO: 10.8 FL (ref 8.9–12.7)
POTASSIUM SERPL-SCNC: 4.7 MMOL/L (ref 3.5–5.3)
PROT SERPL-MCNC: 6.1 G/DL (ref 6.4–8.4)
PROT UR STRIP-MCNC: ABNORMAL MG/DL
RBC # BLD AUTO: 3.58 MILLION/UL (ref 3.88–5.62)
RBC #/AREA URNS AUTO: ABNORMAL /HPF
RBC MORPH BLD: NORMAL
SALICYLATES SERPL-MCNC: <5 MG/DL (ref 3–20)
SODIUM SERPL-SCNC: 138 MMOL/L (ref 135–147)
SP GR UR STRIP.AUTO: 1.02 (ref 1–1.03)
UROBILINOGEN UR STRIP-ACNC: <2 MG/DL
WBC # BLD AUTO: 5.29 THOUSAND/UL (ref 4.31–10.16)
WBC #/AREA URNS AUTO: ABNORMAL /HPF

## 2024-05-11 PROCEDURE — 99223 1ST HOSP IP/OBS HIGH 75: CPT | Performed by: INTERNAL MEDICINE

## 2024-05-11 PROCEDURE — 82746 ASSAY OF FOLIC ACID SERUM: CPT

## 2024-05-11 PROCEDURE — 82607 VITAMIN B-12: CPT

## 2024-05-11 PROCEDURE — 85025 COMPLETE CBC W/AUTO DIFF WBC: CPT

## 2024-05-11 PROCEDURE — 82948 REAGENT STRIP/BLOOD GLUCOSE: CPT

## 2024-05-11 PROCEDURE — 80053 COMPREHEN METABOLIC PANEL: CPT

## 2024-05-11 PROCEDURE — 97163 PT EVAL HIGH COMPLEX 45 MIN: CPT

## 2024-05-11 PROCEDURE — 71045 X-RAY EXAM CHEST 1 VIEW: CPT

## 2024-05-11 PROCEDURE — 81001 URINALYSIS AUTO W/SCOPE: CPT

## 2024-05-11 PROCEDURE — 83735 ASSAY OF MAGNESIUM: CPT

## 2024-05-11 PROCEDURE — 97167 OT EVAL HIGH COMPLEX 60 MIN: CPT

## 2024-05-11 RX ORDER — FOLIC ACID 1 MG/1
1 TABLET ORAL DAILY
Status: DISCONTINUED | OUTPATIENT
Start: 2024-05-11 | End: 2024-05-13 | Stop reason: HOSPADM

## 2024-05-11 RX ORDER — LORAZEPAM 1 MG/1
2 TABLET ORAL ONCE
Status: COMPLETED | OUTPATIENT
Start: 2024-05-11 | End: 2024-05-11

## 2024-05-11 RX ORDER — PANTOPRAZOLE SODIUM 40 MG/1
40 TABLET, DELAYED RELEASE ORAL DAILY
Status: DISCONTINUED | OUTPATIENT
Start: 2024-05-11 | End: 2024-05-13 | Stop reason: HOSPADM

## 2024-05-11 RX ORDER — ACETAMINOPHEN 325 MG/1
975 TABLET ORAL EVERY 8 HOURS PRN
Status: DISCONTINUED | OUTPATIENT
Start: 2024-05-11 | End: 2024-05-13

## 2024-05-11 RX ORDER — FOLIC ACID 1 MG/1
1 TABLET ORAL DAILY
Status: DISCONTINUED | OUTPATIENT
Start: 2024-05-11 | End: 2024-05-11

## 2024-05-11 RX ORDER — GABAPENTIN 300 MG/1
300 CAPSULE ORAL DAILY
Status: DISCONTINUED | OUTPATIENT
Start: 2024-05-11 | End: 2024-05-13 | Stop reason: HOSPADM

## 2024-05-11 RX ORDER — PRAVASTATIN SODIUM 40 MG
40 TABLET ORAL
Status: DISCONTINUED | OUTPATIENT
Start: 2024-05-11 | End: 2024-05-13 | Stop reason: HOSPADM

## 2024-05-11 RX ORDER — LANOLIN ALCOHOL/MO/W.PET/CERES
100 CREAM (GRAM) TOPICAL DAILY
Status: DISCONTINUED | OUTPATIENT
Start: 2024-05-11 | End: 2024-05-11

## 2024-05-11 RX ORDER — FLUTICASONE FUROATE AND VILANTEROL 200; 25 UG/1; UG/1
1 POWDER RESPIRATORY (INHALATION) DAILY
Status: DISCONTINUED | OUTPATIENT
Start: 2024-05-11 | End: 2024-05-13 | Stop reason: HOSPADM

## 2024-05-11 RX ORDER — ONDANSETRON 2 MG/ML
4 INJECTION INTRAMUSCULAR; INTRAVENOUS EVERY 6 HOURS PRN
Status: DISCONTINUED | OUTPATIENT
Start: 2024-05-11 | End: 2024-05-13 | Stop reason: HOSPADM

## 2024-05-11 RX ORDER — INSULIN LISPRO 100 [IU]/ML
1-6 INJECTION, SOLUTION INTRAVENOUS; SUBCUTANEOUS
Status: DISCONTINUED | OUTPATIENT
Start: 2024-05-11 | End: 2024-05-13 | Stop reason: HOSPADM

## 2024-05-11 RX ORDER — LIDOCAINE 50 MG/G
1 PATCH TOPICAL DAILY
Status: DISCONTINUED | OUTPATIENT
Start: 2024-05-12 | End: 2024-05-13 | Stop reason: HOSPADM

## 2024-05-11 RX ORDER — ALBUTEROL SULFATE 90 UG/1
2 AEROSOL, METERED RESPIRATORY (INHALATION) EVERY 4 HOURS PRN
Status: DISCONTINUED | OUTPATIENT
Start: 2024-05-11 | End: 2024-05-13 | Stop reason: HOSPADM

## 2024-05-11 RX ORDER — NICOTINE 21 MG/24HR
1 PATCH, TRANSDERMAL 24 HOURS TRANSDERMAL DAILY
Status: DISCONTINUED | OUTPATIENT
Start: 2024-05-11 | End: 2024-05-13 | Stop reason: HOSPADM

## 2024-05-11 RX ORDER — TAMSULOSIN HYDROCHLORIDE 0.4 MG/1
0.4 CAPSULE ORAL
Status: DISCONTINUED | OUTPATIENT
Start: 2024-05-11 | End: 2024-05-13 | Stop reason: HOSPADM

## 2024-05-11 RX ORDER — ASPIRIN 81 MG/1
81 TABLET, CHEWABLE ORAL ONCE
Status: COMPLETED | OUTPATIENT
Start: 2024-05-11 | End: 2024-05-11

## 2024-05-11 RX ADMIN — THIAMINE HYDROCHLORIDE 100 MG: 100 INJECTION, SOLUTION INTRAMUSCULAR; INTRAVENOUS at 09:57

## 2024-05-11 RX ADMIN — GABAPENTIN 300 MG: 300 CAPSULE ORAL at 01:10

## 2024-05-11 RX ADMIN — LORAZEPAM 2 MG: 1 TABLET ORAL at 10:31

## 2024-05-11 RX ADMIN — GABAPENTIN 300 MG: 300 CAPSULE ORAL at 08:26

## 2024-05-11 RX ADMIN — Medication 1 TABLET: at 08:24

## 2024-05-11 RX ADMIN — LORAZEPAM 2 MG: 1 TABLET ORAL at 21:37

## 2024-05-11 RX ADMIN — LORAZEPAM 2 MG: 1 TABLET ORAL at 01:10

## 2024-05-11 RX ADMIN — TAMSULOSIN HYDROCHLORIDE 0.4 MG: 0.4 CAPSULE ORAL at 16:13

## 2024-05-11 RX ADMIN — PANTOPRAZOLE SODIUM 40 MG: 40 TABLET, DELAYED RELEASE ORAL at 08:24

## 2024-05-11 RX ADMIN — ASPIRIN 81 MG CHEWABLE TABLET 81 MG: 81 TABLET CHEWABLE at 08:24

## 2024-05-11 RX ADMIN — LORAZEPAM 2 MG: 1 TABLET ORAL at 02:04

## 2024-05-11 RX ADMIN — PRAVASTATIN SODIUM 40 MG: 40 TABLET ORAL at 16:14

## 2024-05-11 RX ADMIN — NICOTINE 1 PATCH: 21 PATCH, EXTENDED RELEASE TRANSDERMAL at 08:24

## 2024-05-11 RX ADMIN — APIXABAN 5 MG: 5 TABLET, FILM COATED ORAL at 17:07

## 2024-05-11 RX ADMIN — FOLIC ACID 1 MG: 1 TABLET ORAL at 08:26

## 2024-05-11 RX ADMIN — ACETAMINOPHEN 975 MG: 325 TABLET ORAL at 21:37

## 2024-05-11 RX ADMIN — FOLIC ACID 1 MG: 1 TABLET ORAL at 01:44

## 2024-05-11 RX ADMIN — APIXABAN 5 MG: 5 TABLET, FILM COATED ORAL at 08:24

## 2024-05-11 RX ADMIN — LORAZEPAM 2 MG: 1 TABLET ORAL at 16:13

## 2024-05-11 RX ADMIN — THIAMINE HYDROCHLORIDE 100 MG: 100 INJECTION, SOLUTION INTRAMUSCULAR; INTRAVENOUS at 01:44

## 2024-05-11 RX ADMIN — FLUTICASONE FUROATE AND VILANTEROL TRIFENATATE 1 PUFF: 200; 25 POWDER RESPIRATORY (INHALATION) at 08:26

## 2024-05-11 NOTE — ASSESSMENT & PLAN NOTE
Patient was hypotensive in the ED : 82/46mmHg MAP 59  S/p 2.5 L NS  MAP stabilized to 92  Continue to hold BP medications : Ranexa, lisinopril and metoprolol  Continue to monitor BP

## 2024-05-11 NOTE — PLAN OF CARE
Problem: PAIN - ADULT  Goal: Verbalizes/displays adequate comfort level or baseline comfort level  Description: Interventions:  - Encourage patient to monitor pain and request assistance  - Assess pain using appropriate pain scale  - Administer analgesics based on type and severity of pain and evaluate response  - Implement non-pharmacological measures as appropriate and evaluate response  - Consider cultural and social influences on pain and pain management  - Notify physician/advanced practitioner if interventions unsuccessful or patient reports new pain  Outcome: Progressing     Problem: INFECTION - ADULT  Goal: Absence or prevention of progression during hospitalization  Description: INTERVENTIONS:  - Assess and monitor for signs and symptoms of infection  - Monitor lab/diagnostic results  - Monitor all insertion sites, i.e. indwelling lines, tubes, and drains  - Monitor endotracheal if appropriate and nasal secretions for changes in amount and color  - [place] appropriate cooling/warming therapies per order  - Administer medications as ordered  - Instruct and encourage patient and family to use good hand hygiene technique  - Identify and instruct in appropriate isolation precautions for identified infection/condition  Outcome: Progressing  Goal: Absence of fever/infection during neutropenic period  Description: INTERVENTIONS:  - Monitor WBC    Outcome: Progressing     Problem: SAFETY ADULT  Goal: Patient will remain free of falls  Description: INTERVENTIONS:  - Educate patient/family on patient safety including physical limitations  - Instruct patient to call for assistance with activity   - Consult OT/PT to assist with strengthening/mobility   - Keep Call bell within reach  - Keep bed low and locked with side rails adjusted as appropriate  - Keep care items and personal belongings within reach  - Initiate and maintain comfort rounds  - Make Fall Risk Sign visible to staff  - Offer Toileting every 2 Hours,  in advance of need  - Initiate/Maintain bed and chair alarm  - Obtain necessary fall risk management equipment: bed and chair alarm   - Apply yellow socks and bracelet for high fall risk patients  - Consider moving patient to room near nurses station  Outcome: Progressing  Goal: Maintain or return to baseline ADL function  Description: INTERVENTIONS:  -  Assess patient's ability to carry out ADLs; assess patient's baseline for ADL function and identify physical deficits which impact ability to perform ADLs (bathing, care of mouth/teeth, toileting, grooming, dressing, etc.)  - Assess/evaluate cause of self-care deficits   - Assess range of motion  - Assess patient's mobility; develop plan if impaired  - Assess patient's need for assistive devices and provide as appropriate  - Encourage maximum independence but intervene and supervise when necessary  - Involve family in performance of ADLs  - Assess for home care needs following discharge   - Consider OT consult to assist with ADL evaluation and planning for discharge  - Provide patient education as appropriate  Outcome: Progressing  Goal: Maintains/Returns to pre admission functional level  Description: INTERVENTIONS:  - Perform AM-PAC 6 Click Basic Mobility/ Daily Activity assessment daily.  - Set and communicate daily mobility goal to care team and patient/family/caregiver.   - Collaborate with rehabilitation services on mobility goals if consulted  - Perform Range of Motion 3 times a day.  - Reposition patient every 2 hours.  - Dangle patient 3 times a day  - Stand patient 3 times a day  - Ambulate patient 3 times a day  - Out of bed to chair 3 times a day   - Out of bed for meals 3 times a day  - Out of bed for toileting  - Record patient progress and toleration of activity level   Outcome: Progressing     Problem: DISCHARGE PLANNING  Goal: Discharge to home or other facility with appropriate resources  Description: INTERVENTIONS:  - Identify barriers to  discharge w/patient and caregiver  - Arrange for needed discharge resources and transportation as appropriate  - Identify discharge learning needs (meds, wound care, etc.)  - Arrange for interpretive services to assist at discharge as needed  - Refer to Case Management Department for coordinating discharge planning if the patient needs post-hospital services based on physician/advanced practitioner order or complex needs related to functional status, cognitive ability, or social support system  Outcome: Progressing     Problem: Knowledge Deficit  Goal: Patient/family/caregiver demonstrates understanding of disease process, treatment plan, medications, and discharge instructions  Description: Complete learning assessment and assess knowledge base.  Interventions:  - Provide teaching at level of understanding  - Provide teaching via preferred learning methods  Outcome: Progressing     Problem: Potential for Falls  Goal: Patient will remain free of falls  Description: INTERVENTIONS:  - Educate patient/family on patient safety including physical limitations  - Instruct patient to call for assistance with activity   - Consult OT/PT to assist with strengthening/mobility   - Keep Call bell within reach  - Keep bed low and locked with side rails adjusted as appropriate  - Keep care items and personal belongings within reach  - Initiate and maintain comfort rounds  - Make Fall Risk Sign visible to staff  - Offer Toileting every 2 Hours, in advance of need  - Initiate/Maintain bed/chair alarm  - Obtain necessary fall risk management equipment: bed/chair alarm/yellow socks   - Apply yellow socks and bracelet for high fall risk patients  - Consider moving patient to room near nurses station  Outcome: Progressing

## 2024-05-11 NOTE — UTILIZATION REVIEW
"  Initial Clinical Review    Admission: Date/Time/Statement: 5/10/24 AT 2331 OBSERVATION - CONVERTED TO INPATIENT 5/11/24 AT 1354 2ND ACUTE ALCOHOL INTOXICATION WITH HYPOMAGNESIA< SIRS - REQUIRING CONTINUED INPATIENT MANAGEMENT AFTER OBSERVATION + > 2 MIDNITES - CIWA MONITORING, THIAMINE, MVI + FOLIC ACID, ELECTROLYTE REPLACEMENT     05/11/24 1355  INPATIENT ADMISSION  Once        Transfer Service: Hospitalist   Question Answer Comment   Level of Care Med Surg    Estimated length of stay More than 2 Midnights    Certification I certify that inpatient services are medically necessary for this patient for a duration of greater than two midnights. See H&P and MD Progress Notes for additional information about the patient's course of treatment.        05/11/24 1354   05/10/24 2331  Place in Observation  Once        Transfer Service: Hospitalist   Question: Level of Care Answer: Med Surg    05/10/24 2331     ED Arrival Information       Expected   -    Arrival   5/10/2024 14:08    Acuity   Urgent              Means of arrival   Ambulance    Escorted by   [facility]    Service   Hospitalist    Admission type   Emergency              Arrival complaint   ETOH          Chief Complaint   Patient presents with    Alcohol Intoxication     Pt brought in by ambulance after pt was reportedly found by police on a picnic table outside of resident at [facility].  Pt reports coming to the hospital \"To get Sober\".  Pt denies any chest pain, SOb or other discomfort.  Pt reports drinking Vodka today up to 30 minutes ago, and has been on a binge for the last 2 weeks.       Initial Presentation:   63 y/o male with PMHx HTN, HLD, CAD with CABG x 3, Atrial Fib on Eliquis, COPD,  SUNITA, alcohol abuse, smoker, medication non-compliance  - presented via EMS to [facility] ED on 5/10/24 2nd alcohol intoxication as witnessed by the police outside a resident building. In ED - patient reports binge drinking  2 weeks with last drink the " "previous night where he drank 7-8 glasses of vodka.   In ED - tacycardia 106  BP elevated 176/96  Exam:intoxicated. Difficult to assess. Looking for his walker.  CIWA score 11  bruising on arms  drowsy with tremors   Chest x ray - mild pulmonary venous congestion  Labs: CBC: WNL, CMP: cr 1.11, , ALT 74, Lipase 85, Mg 1.3, LA 1.9. Coma Panel: ethanol 174    ED Tx: IVF NS x 2.5 L, IV Mag SO4, IV Zofran,  home meds - metoprolol, lisinopril, nanexa  Placed in Observation 5/10/24 at 2236 2nd Acute Alcohol Intoxication -    5/10/24 - 5/11/24   Alcohol intoxication (HCC)     A/e/b  ethanol 174  Exam significant for considerable tremors, drowsy state, AO x 2  H/o chronic daily alcohol consumption, denies h/o withdrawal seizures  Last drink: 05/09/24, 7-8 glasses of vodka   CIWA score: 11, ativan given     Plan  CIWA protocol    Thiamine IV, folic acid and multivitamin  Continue to monitor signs of withdrawal    Systemic inflammatory response syndrome (SIRS) (HCC)  Patient brought to ED by police in an intoxicated state. He was found outside of a resident building and brought to the hospital to \"become sober\"  Met SIRS criteria with tachypnea, Hypotension, tachycardia   ED course: 2.5 L NS, Zofran, Mg sulphate, home meds including metoprolol, lisinopril, nanexa  Labs: CBC: WNL, CMP: cr 1.11, , ALT 74, Lipase 85, Mg 1.3, LA 1.9. Coma Panel: ethanol 174      Plan  Hold all BP meds, due to low BP  Continue to monitor BP as patient continue to stay on the softer side  Follow Bcx and Ucx  Follow UA  Monitor vital signs and I/Os  CIWA protocol  Aspiration precautions, Fall precautions  CMP, BMP, Mg, Coagulation panel AM    CONVERTED TO INPATIENT 5/11/24 AT 1354 2ND ACUTE ALCOHOL INTOXICATION WITH HYPOMAGNESIA< SIRS - REQUIRING CONTINUED INPATIENT MANAGEMENT AFTER OBSERVATION + > 2 MIDNITES - CIWA MONITORING, THIAMINE, MVI + FOLIC ACID, ELECTROLYTE REPLACEMENT     History of alcohol abuse with alcohol intoxication " with withdrawal-patient has significant shakiness.  Continue CIWA protocol.  Continue multivitamin, thiamine and folic acid.  Monitor closely for signs of withdrawal.  Check vitamin B12 as patient reports leg weakness  SIRS-patient noted to have tachycardia with tachypnea and did have soft blood pressure after he received oral antihypertensives.  Doubtful infections.  Follow-up septic workup   Diabetes mellitus type 2-continue diabetic diet.  Like metformin has been on hold.  Continue Humalog sliding scale.  Hypomagnesemia-resolved with repletion  Hypertension continue lisinopril, Lopressor, Ranexa with holding parameters.  History of PAF-chads vascular score is 3 and patient is on Eliquis  History of prostate cancer status postradiation  History of chronic heart failure-no evidence of fluid exacerbation      5/12/24 - DAY 3:  Has surpassed a 2nd midnight with active treatments and services.     Alcohol intoxication (HCC)  A/e/b  ethanol 174  Exam significant for considerable tremors, drowsy state, AO x 2  H/o chronic daily alcohol consumption, denies h/o withdrawal seizures  Last drink: 05/09/24, 7-8 glasses of vodka   CIWA score: 11, ativan given     Plan  CIWA protocol  Thiamine IV, folic acid and multivitamin  Continue to monitor signs of withdrawal             Aspiration precautions, Fall precautions     MD Certification Statement: The patient will continue to require additional inpatient hospital stay due to CIWA     ED Triage Vitals   Temperature Pulse Respirations Blood Pressure SpO2   05/10/24 1800 05/10/24 1425 05/10/24 1425 05/10/24 1425 05/10/24 1425   97.9 °F (36.6 °C) (!) 106 20 (!) 176/96 96 %      Temp Source Heart Rate Source Patient Position - Orthostatic VS BP Location FiO2 (%)   05/10/24 1425 05/10/24 1425 05/10/24 1425 05/10/24 1425 --   Tympanic Monitor Lying Right arm       Wt Readings from Last 1 Encounters:   05/12/24 87.4 kg (192 lb 9.6 oz)     Additional Vital Signs:   Date/Time Temp Pulse  Resp BP MAP (mmHg) SpO2 Calculated FIO2 (%) - Nasal Cannula Nasal Cannula O2 Flow Rate (L/min) O2 Device Patient Position - Orthostatic VS   05/12/24 07:13:51 -- 76 17 167/79 108 95 % -- -- -- --   05/12/24 06:25:18 -- 74 -- 176/112 Abnormal  133 95 % -- -- -- --   05/12/24 06:24:03 -- 80 -- 170/119 Abnormal  136 95 % -- -- -- --   05/12/24 03:19:36 97.8 °F (36.6 °C) 58 18 145/64 91 94 % -- -- -- --   05/12/24 02:32:09 -- 61 -- 138/68 91 95 % -- -- -- --   05/11/24 22:30:45 -- 73 -- 154/70 98 97 % -- -- -- --   05/11/24 21:33:55 -- 72 -- 158/70 99 98 % -- -- -- --   05/11/24 20:05:44 -- 88 -- 136/65 89 97 % -- -- -- --   05/11/24 1929 98.5 °F (36.9 °C) -- -- -- -- -- -- -- -- --   05/11/24 19:27:05 -- 86 18 120/80 93 96 % -- -- -- --   05/11/24 16:06:29 -- 84 18 150/83 105 97 % -- -- -- --   05/11/24 10:09:40 -- 77 19 145/72 96 93 % -- -- -- --   05/11/24 07:29:21 -- 63 20 116/58 77 95 % -- -- -- --   05/11/24 06:50:51 -- 64 -- 121/60 80 95 % -- -- -- --   05/11/24 03:22:05 -- 60 -- 103/50 68 96 % -- -- -- --   05/11/24 02:53:44 -- 60 -- 103/50 68 97 % -- -- -- --   05/11/24 01:55:37 -- 75 -- 128/71 90 93 % -- -- -- --   05/11/24 0100 -- -- -- -- -- 94 % 28 2 L/min Nasal cannula --   05/11/24 0051 -- 76 -- 130/74 -- -- -- -- -- --   05/11/24 0015 98 °F (36.7 °C) 66 20 121/73 92 94 % -- -- None (Room air) --   05/10/24 2355 -- 72 18 116/67 86 97 % -- -- -- --   05/10/24 2345 -- 64 20 107/55 74 96 % -- -- -- --   05/10/24 2315 -- 68 20 106/60 76 96 % -- -- -- --   05/10/24 2255 -- -- -- 91/53 66 -- -- -- -- --   05/10/24 2239 -- 62 29 Abnormal  80/44 Abnormal  57 Abnormal  94 % -- -- -- --   05/10/24 2205 97.6 °F (36.4 °C) 58 22 74/37 Abnormal  -- 94 % -- -- None (Room air) Lying   05/10/24 1815 -- 70 20 -- -- 95 % -- -- -- --   05/10/24 1800 97.9 °F (36.6 °C) 80 20 116/61 84 95 % -- -- None (Room air) Lying   05/10/24 1630 -- 84 19 82/46 Abnormal  59 Abnormal  94 % -- -- -- --   05/10/24 1515 -- -- 20 195/103  Abnormal   138 94 % -- -- None (Room air) Lying   BP: Dr. Madden aware at 05/10/24 1515   05/10/24 1500 -- 164 Abnormal  20 -- -- -- -- -- -- --   05/10/24 1425 -- 106 Abnormal  20 176/96 Abnormal  -- 96 % -- -- None (Room air) Lying     CIWA-Ar Score  Row Name 05/11/24 16:06:29 05/11/24 1130 05/11/24 1018 05/11/24 10:09:40 05/11/24 07:29:21   CIWA-Ar   /83  -DI -- -- 145/72  -/58  -DI   Pulse 84  -DI -- -- 77  -DI 63  -DI   Nausea and Vomiting 0  -ALIZE 0  -ALIZE 0  -ALIZE -- --   Tactile Disturbances 0  -ALIZE 0  -ALIZE 0  -ALIZE -- --   Tremor 3  -ALIZE 2  -ALIZE 4  -ALIZE -- --   Auditory Disturbances 0  -ALIZE 0  -ALIZE 0  -ALIZE -- --   Paroxysmal Sweats 0  -ALIZE 0  -ALIZE 0  -ALIZE -- --   Visual Disturbances 0  -ALIZE 0  -ALIZE 0  -ALIZE -- --   Anxiety 3  -ALIZE 1  -ALIZE 2  -ALIZE -- --   Headache, Fullness in Head 2  -ALIZE 2  -ALIZE 3  -ALIZE -- --   Agitation 0  -ALIZE 0  -ALIZE 0  -ALIZE -- --   Orientation and Clouding of Sensorium 0  -ALIZE 0  -ALIZE 0  -ALIZE -- --   CIWA-Ar Total 8  -ALIZE 5  -ALIZE 9  -ALIZE -- --     CIWA-Ar Score  Row Name 05/12/24 07:13:51 05/12/24 06:25:18 05/12/24 06:24:03 05/12/24 03:19:36 05/12/24 02:32:09   CIWA-Ar   /79  -/112 Abnormal   -/119 Abnormal   -/64  -/68  -DI   Pulse 76  -DI 74  -DI 80  -DI 58  -DI 61  -DI   Nausea and Vomiting -- 0  -ML -- -- 0  -ML   Tactile Disturbances -- 0  -ML -- -- 0  -ML   Tremor -- 3  -ML -- -- 3  -ML   Auditory Disturbances -- 0  -ML -- -- 0  -ML   Paroxysmal Sweats -- 0  -ML -- -- 0  -ML   Visual Disturbances -- 0  -ML -- -- 0  -ML   Anxiety -- 0  -ML -- -- 0  -ML   Headache, Fullness in Head -- 0  -ML -- -- 1  -ML   Agitation -- 0  -ML -- -- 0  -ML   Orientation and Clouding of Sensorium -- 0  -ML -- -- 0  -ML   CIWA-Ar Total -- 3  -ML -- -- 4  -ML      05/10 0701  05/11 0700 05/11 0701  05/12 0700 05/12 0701  -   IV Piggyback 2050     Total Intake(mL/kg) 2050 (24.8)     Urine (mL/kg/hr) 200 1300 (0.6) 200 (1.9)   Total Output 200 1300 200   Net +1850 -1300 -200     Pertinent  Labs/Diagnostic Test Results:   XR chest portable   Question mild pulmonary venous congestion. No definite aspiration.       Results from last 7 days   Lab Units 05/12/24  0329 05/11/24  0334 05/10/24  1445   WBC Thousand/uL 4.41 5.29 6.30   HEMOGLOBIN g/dL 11.4* 11.8* 14.0   HEMATOCRIT % 34.2* 37.0 40.6   PLATELETS Thousands/uL 57* 85* 107*   TOTAL NEUT ABS Thousands/µL 2.69 3.65 3.94     Results from last 7 days   Lab Units 05/12/24  0329 05/11/24  0334 05/10/24  1445   SODIUM mmol/L 132* 138 139   POTASSIUM mmol/L 4.1 4.7 5.0   CHLORIDE mmol/L 100 106 100   CO2 mmol/L 27 21 23   ANION GAP mmol/L 5 11 16*   BUN mg/dL 20 19 21   CREATININE mg/dL 1.07 1.22 1.11   EGFR ml/min/1.73sq m 73 63 70   CALCIUM mg/dL 8.3* 7.7* 8.6   MAGNESIUM mg/dL 1.5* 2.7 1.3*     Results from last 7 days   Lab Units 05/11/24  0334 05/10/24  1445   AST U/L 121* 137*   ALT U/L 61* 74*   ALK PHOS U/L 73 99   TOTAL PROTEIN g/dL 6.1* 7.8   ALBUMIN g/dL 3.3* 4.2   TOTAL BILIRUBIN mg/dL 0.53 0.62     Results from last 7 days   Lab Units 05/12/24  1618 05/12/24  1135 05/12/24  0724 05/11/24  2051 05/11/24  1601 05/11/24  1153 05/11/24  0717 05/10/24  1746   POC GLUCOSE mg/dl 141* 125 122 120 121 137 79 94     Results from last 7 days   Lab Units 05/12/24  0329 05/11/24  0334 05/10/24  1445   GLUCOSE RANDOM mg/dL 113 81 86       Results from last 7 days   Lab Units 05/10/24  2336   LACTIC ACID mmol/L 1.9       Results from last 7 days   Lab Units 05/10/24  1445   LIPASE u/L 85*     Results from last 7 days   Lab Units 05/11/24  0251   CLARITY UA  Clear   COLOR UA  Yellow   SPEC GRAV UA  1.025   PH UA  5.5   GLUCOSE UA mg/dl Negative   KETONES UA mg/dl Negative   BLOOD UA  Negative   PROTEIN UA mg/dl 30 (1+)*   NITRITE UA  Negative   BILIRUBIN UA  Negative   UROBILINOGEN UA (BE) mg/dl <2.0   LEUKOCYTES UA  Negative   WBC UA /hpf 0-1   RBC UA /hpf 0-1   BACTERIA UA /hpf None Seen   EPITHELIAL CELLS WET PREP /hpf Occasional       Results from last 7  days   Lab Units 05/10/24  2354   ETHANOL LVL mg/dL 174*   ACETAMINOPHEN LVL ug/mL <2*   SALICYLATE LVL mg/dL <5     ED Treatment:   Medication Administration from 05/10/2024 1408 to 05/11/2024 0027         Date/Time Order Dose Route Action     05/10/2024 1739 EDT sodium chloride 0.9 % bolus 1,000 mL 0 mL Intravenous Stopped     05/10/2024 1446 EDT sodium chloride 0.9 % bolus 1,000 mL 1,000 mL Intravenous New Bag     05/10/2024 1446 EDT ondansetron (ZOFRAN) injection 4 mg 4 mg Intravenous Given     05/10/2024 1530 EDT apixaban (ELIQUIS) tablet 5 mg 5 mg Oral Given     05/10/2024 1529 EDT aspirin chewable tablet 324 mg 324 mg Oral Given     05/10/2024 1530 EDT gabapentin (NEURONTIN) capsule 300 mg 300 mg Oral Given     05/10/2024 1530 EDT lisinopril (ZESTRIL) tablet 20 mg 20 mg Oral Given     05/10/2024 1530 EDT metFORMIN (GLUCOPHAGE) tablet 500 mg 500 mg Oral Given     05/10/2024 1530 EDT metoprolol tartrate (LOPRESSOR) tablet 50 mg 50 mg Oral Given     05/10/2024 1531 EDT pravastatin (PRAVACHOL) tablet 40 mg 40 mg Oral Given     05/10/2024 1530 EDT ranolazine (RANEXA) 12 hr tablet 500 mg 500 mg Oral Given     05/10/2024 1630 EDT magnesium sulfate 2 g/50 mL IVPB (premix) 2 g 0 g Intravenous Stopped     05/10/2024 1556 EDT magnesium sulfate 2 g/50 mL IVPB (premix) 2 g 2 g Intravenous New Bag     05/10/2024 2243 EDT sodium chloride 0.9 % bolus 1,000 mL 0 mL Intravenous Stopped     05/10/2024 1739 EDT sodium chloride 0.9 % bolus 1,000 mL 1,000 mL Intravenous New Bag     05/10/2024 2243 EDT sodium chloride 0.9 % bolus 1,000 mL 1,000 mL Intravenous New Bag     05/10/2024 2355 EDT magnesium sulfate 4 g/100 mL IVPB (premix) 4 g 4 g Intravenous New Bag          Past Medical History:   Diagnosis Date    Acute on chronic kidney failure  (HCC)     Alcohol withdrawal (HCC) 06/07/2019    Atrial fibrillation (HCC)     Cancer (HCC)     prostate ca,had radiation    Cardiac disease     stents,then triple bypass    COPD (chronic  obstructive pulmonary disease) (HCC)     Coronary artery disease     ETOH abuse     Heart failure (HCC)     History of heart surgery     says triple bypass [facility]    Hx of heart artery stent     2014    Hyperlipidemia     Hypertension     Hypovolemic shock (HCC) 12/22/2019    Lumbar spondylitis (HCC) 10/13/2022    Nasal bone fracture 10/10/2022    Prostate CA (HCC)     S/P CABG x 3     2004    Sleep apnea      Present on Admission:   Sleep apnea   Prostate cancer (HCC)   Paroxysmal atrial fibrillation (HCC)   Hypothyroidism   Hypertension   GERD (gastroesophageal reflux disease)   Fatty liver, alcoholic   Dyslipidemia   Depression, recurrent (HCC)   COPD (chronic obstructive pulmonary disease) (HCC)   Alcohol intoxication (HCC)   (Resolved) Hypotension   Hypomagnesemia   Chronic heart failure with preserved ejection fraction (HCC)   Systemic inflammatory response syndrome (SIRS) (HCC)   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 disease (HCC)    Admitting Diagnosis: Alcohol intoxication (HCC) [F10.929]  Weakness [R53.1]  Hypotension [I95.9]    Age/Sex: 64 y.o. male    Admission Orders:    Scheduled Medications:  apixaban, 5 mg, Oral, BID  fluticasone-vilanterol, 1 puff, Inhalation, Daily  folic acid, 1 mg, Oral, Daily  gabapentin, 300 mg, Oral, Daily  insulin lispro, 1-6 Units, Subcutaneous, 4x Daily (AC & HS)  multivitamin-minerals, 1 tablet, Oral, Daily  nicotine, 1 patch, Transdermal, Daily  pantoprazole, 40 mg, Oral, Daily  pravastatin, 40 mg, Oral, Daily With Dinner  tamsulosin, 0.4 mg, Oral, Daily With Dinner  thiamine, 100 mg, Intravenous, Daily    Continuous IV Infusions:  IVF sodium chloride 0.9 % bolus 1,000 mL     Ordered Dose: 1,000 mL Route: Intravenous Frequency: Once @ 1,000 mL/hr over 1 Hours   Scheduled Start Date/Time: 05/10/24 1745 End Date/Time: 05/10/24 2243 after 1 doses      PRN Meds:  acetaminophen (TYLENOL) tablet 975 mg, PO Q8H PRN - 5/11 X 1 @ 2137   albuterol, 2 puff,  Inhalation, Q4H PRN  ondansetron, 4 mg, Intravenous, Q6H PRN    Network Utilization Review Department  ATTENTION: Please call with any questions or concerns to [phone] and carefully listen to the prompts so that you are directed to the right person. All voicemails are confidential.   For Discharge needs, contact Care Management DC Support Team at [phone] opt. [phone]  Send all requests for admission clinical reviews, approved or denied determinations and any other requests to dedicated fax number below belonging to the [facility] where the patient is receiving treatment. List of dedicated fax numbers for the Facilities:  FACILITY NAME UR FAX NUMBER   ADMISSION DENIALS (Administrative/Medical Necessity) [phone]   DISCHARGE SUPPORT TEAM (NETWORK) [phone]   PARENT CHILD HEALTH (Maternity/NICU/Pediatrics) [phone]   [facility] [phone]   [facility] [phone]   [facility] [phone]   [facility] [phone]   [facility] [phone]   [facility] [phone]   [facility] [phone]   [facility] [phone]   [facility] [phone]   [facility] [phone]   [facility] [phone]   [facility] [phone]

## 2024-05-11 NOTE — ASSESSMENT & PLAN NOTE
Patient had a prior triple bypass surgery in 2004    Home meds include: Pravastatin, aspirin  Continue home meds

## 2024-05-11 NOTE — ASSESSMENT & PLAN NOTE
Chronic, stable  Last lipid panel 11 months ago: Cholesterol 190, TG 71,     Continue home dose of Pravastatin 40 mg

## 2024-05-11 NOTE — PLAN OF CARE
Problem: PHYSICAL THERAPY ADULT  Goal: Performs mobility at highest level of function for planned discharge setting.  See evaluation for individualized goals.  Description: Treatment/Interventions: ADL retraining, Functional transfer training, LE strengthening/ROM, Therapeutic exercise, Endurance training, Gait training, Bed mobility, Spoke to nursing, OT          See flowsheet documentation for full assessment, interventions and recommendations.  Note: Prognosis: Good  Problem List: Decreased strength, Decreased endurance, Impaired balance, Decreased mobility, Decreased coordination, Impaired judgement, Decreased safety awareness, Pain  Assessment: Patient seen for Physical Therapy evaluation. Patient admitted with Systemic inflammatory response syndrome (SIRS) (HCC).  Comorbidities affecting patient's physical performance include: Afib, CABG, CAD, cancer, cardiac disease, CKD, COPD, diabetes, falls, GERD, hypertension, and hypothyroid.  Personal factors affecting patient at time of initial evaluation include: lives in 1 story house, ambulating with assistive device, inability to navigate community distances, inability to navigate level surfaces without external assistance, limited home support, positive fall history, limited insight into impairments, inability to perform ADLS, inability to perform IADLS , and inability to live alone. Prior to admission, patient was independent with functional mobility with intermittent use of walker, independent with ADLS, requiring assist for IADLS, living alone in 1 story home with 0 (elevator) steps to enter, and ambulating household distance.  Please find objective findings from Physical Therapy assessment regarding body systems outlined above with impairments and limitations including weakness, impaired balance, decreased endurance, impaired coordination, gait deviations, pain, decreased activity tolerance, decreased functional mobility tolerance, decreased safety  awareness, impaired judgement, and fall risk.  The Barthel Index was used as a functional outcome tool presenting with a score of Barthel Index Score: 45 today indicating marked limitations of functional mobility and ADLS.  Patient's clinical presentation is currently unstable/unpredictable as seen in patient's presentation of increased fall risk, new onset of impairment of functional mobility, and decreased endurance. Pt would benefit from continued Physical Therapy treatment to address deficits as defined above and maximize level of functional mobility. As demonstrated by objective findings, the assigned level of complexity for this evaluation is high.The [facility] Basic Mobility Inpatient Short Form Raw Score is 16. A Raw score of less than or equal to 16 suggests the patient may benefit from discharge to post-acute rehabilitation services. Please also refer to the recommendation of the Physical Therapist for safe discharge planning.        Rehab Resource Intensity Level, PT: II (Moderate Resource Intensity)    See flowsheet documentation for full assessment.

## 2024-05-11 NOTE — NURSING NOTE
Patient states he came into hospital with a walker from home and that it is missing. Patient did not have walker on admission. Per ED report, walker was found by police on a picnic table.

## 2024-05-11 NOTE — ASSESSMENT & PLAN NOTE
A/e/b  ethanol 174  Exam significant for considerable tremors, drowsy state, AO x 2  H/o chronic daily alcohol consumption, denies h/o withdrawal seizures  Last drink: 05/09/24, 7-8 glasses of vodka    CIWA score: 11, ativan given    Plan  [facility] protocol  Thiamine IV, folic acid and multivitamin  Continue to monitor signs of withdrawal

## 2024-05-11 NOTE — CASE MANAGEMENT
Case Management Assessment    Patient name Gregg Partida  Location [address]-* MRN [id number]  : 1960 Date 2024       Current Admission Date: 5/10/2024  Current Admission Diagnosis:Systemic inflammatory response syndrome (SIRS) ([facility])   Patient Active Problem List    Diagnosis Date Noted    Alcohol intoxication ([facility]) 05/10/2024    Fall, initial encounter 2024    Hypertension 2024    Hemorrhagic cystitis 2024    Hypotension 2024    Alcohol abuse 2024    Elevated troponin 02/10/2024    Hypothyroidism 2024    Troponin level elevated 2024    Gastrointestinal hemorrhage with melena 10/29/2023    Abnormal thyroid stimulating hormone level 2023    Chest pain 2023    Paroxysmal atrial fibrillation ([facility]) 2023    GERD (gastroesophageal reflux disease) 2023    Sleep apnea 2023    Stable angina 2022    Stage 3a chronic kidney disease ([facility]) 2022    Fatty liver, alcoholic 04/15/2022    Nonischemic nontraumatic myocardial injury 04/10/2022    History of atrial fibrillation 10/25/2021    Mild protein-calorie malnutrition ([facility]) 2021    Prostate cancer ([facility]) 2021    A-fib ([facility]) 2020    Chronic heart failure with preserved ejection fraction ([facility]) 2019    Alcohol abuse with withdrawal delirium ([facility]) 2019    Systemic inflammatory response syndrome (SIRS) ([facility]) 2019    Alcohol withdrawal syndrome with complication ([facility]) 10/17/2019    Hyponatremia 10/17/2019    Cervical spinal stenosis 10/17/2019    Thrombocytopenia ([facility]) 2019    History of prostate cancer 2019    CAD (coronary artery disease) 2019    Nicotine abuse 2019    Hypomagnesemia 10/10/2018    Depression, recurrent ([facility]) 10/10/2018    Hx of CABG 10/10/2018    Dyslipidemia 10/10/2018    COPD (chronic obstructive pulmonary disease) ([facility]) 10/09/2018    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  disease (HCC) 10/09/2018    Hypertensive heart and chronic kidney disease with heart failure and stage 1 through stage 4 chronic kidney disease, or chronic kidney disease (HCC) 10/09/2018      LOS (days): 0  Geometric Mean LOS (GMLOS) (days):   Days to GMLOS:     OBJECTIVE:      Current admission status: Observation     Preferred Pharmacy:   [address] [address]  Phone: [phone] Fax: [phone]    Primary Care Provider: Korin Lo MD    Primary Insurance: [employer] REP  Secondary Insurance:     ASSESSMENT:  Active Health Care Proxies       Gregg Partida Jr Health Care Representative - Son   Primary Phone: [phone] (Mobile)                    Obs Notice Signed: 05/11/24 (MOON reviewed with patient. Patient gave verbal understanding, signed, and was provided original. Copy placed in scan bin.)    Social Determinants of Health (SDOH)      Flowsheet Row Most Recent Value   Housing Stability    In the last 12 months, was there a time when you were not able to pay the mortgage or rent on time? N   In the last 12 months, how many places have you lived? 1   In the last 12 months, was there a time when you did not have a steady place to sleep or slept in a shelter (including now)? N   Transportation Needs    In the past 12 months, has lack of transportation kept you from medical appointments or from getting medications? no   In the past 12 months, has lack of transportation kept you from meetings, work, or from getting things needed for daily living? No   Food Insecurity    Within the past 12 months, you worried that your food would run out before you got the money to buy more. Never true   Within the past 12 months, the food you bought just didn't last and you didn't have money to get more. Never true   Utilities    In the past 12 months has the electric, gas, oil, or water company threatened to shut off services  in your home? No

## 2024-05-11 NOTE — OCCUPATIONAL THERAPY NOTE
Occupational Therapy Evaluation       05/11/24 1100   OT Last Visit   OT Visit Date 05/11/24   Note Type   Note type Evaluation   Pain Assessment   Pain Assessment Tool 0-10   Pain Score No Pain   Restrictions/Precautions   Weight Bearing Precautions Per Order No   Other Precautions Bed Alarm;Chair Alarm;Fall Risk;Pain   Home Living   Type of Home Apartment   Home Layout One level;Able to live on main level with bedroom/bathroom;Performs ADLs on one level   Bathroom Shower/Tub Tub/shower unit   Bathroom Toilet Standard   Home Equipment Walker   Prior Function   Level of [place] Independent with ADLs;Independent with functional mobility;Needs assistance with IADLS   Lives With Alone   Receives Help From Family   IADLs Independent with meal prep;Independent with medication management;Family/Friend/Other provides transportation  (uses public transportation)   Falls in the last 6 months 1 to 4   General   Additional Pertinent History Pt admitted with alcohol intoxication.   Subjective   Subjective Pt agreeable to OT evaluation   ADL   Eating Assistance 6  Modified independent   Grooming Assistance 5  Supervision/Setup   UB Bathing Assistance 4  Minimal Assistance   LB Bathing Assistance 4  Minimal Assistance   UB Dressing Assistance 4  Minimal Assistance   LB Dressing Assistance 4  Minimal Assistance   Toileting Assistance  4  Minimal Assistance   Bed Mobility   Supine to Sit 5  Supervision   Additional items Assist x 1;Verbal cues;Increased time required;Bedrails;HOB elevated   Sit to Supine 5  Supervision   Additional items Assist x 1;Increased time required;Verbal cues;Bedrails;HOB elevated   Transfers   Sit to Stand 4  Minimal assistance   Additional items Assist x 1;Verbal cues;Increased time required   Stand to Sit 4  Minimal assistance   Additional items Assist x 1;Verbal cues;Increased time required   Stand pivot 4  Minimal assistance   Additional items Assist x 1;Verbal cues;Increased time required    Balance   Static Sitting Fair   Dynamic Sitting Fair -   Static Standing Fair -   Dynamic Standing Fair -   Activity Tolerance   Activity Tolerance Patient tolerated treatment well;Patient limited by fatigue   RUE Assessment   RUE Assessment   (ROM WFL, MMT grossly 3+/5)   LUE Assessment   LUE Assessment   (ROM WFL, MMT grossly 3+/5)   Cognition   Overall Cognitive Status WFL   Arousal/Participation Alert;Cooperative   Attention Within functional limits   Orientation Level Oriented X4   Following Commands Follows multistep commands with increased time or repetition   Assessment   Limitation Decreased ADL status;Decreased UE strength;Decreased Safe judgement during ADL;Decreased endurance;Decreased self-care trans;Decreased high-level ADLs  (decreased balance and mobility)   Prognosis Good   Assessment Patient evaluated by Occupational Therapy.  Patient admitted with Systemic inflammatory response syndrome (SIRS) ([facility]).  The patients occupational profile, medical and therapy history includes a extensive additional review of physical, cognitive, or psychosocial history related to current functional performance.  Comorbidities affecting functional mobility and ADLS include: CAD, CABG, A-fib, cancer, cardiac disease, CKD, COPD, DM, falls, HTN.  Prior to admission, patient was independent with functional mobility with intermittent use of walker, independent with ADLS, and requiring assist for IADLS.  The evaluation identifies the following performance deficits: weakness, impaired balance, decreased endurance, decreased coordination, increased fall risk, new onset of impairment of functional mobility, decreased ADLS, decreased IADLS, decreased activity tolerance, decreased safety awareness, impaired judgement, and decreased strength, that result in activity limitations and/or participation restrictions. This evaluation requires clinical decision making of high complexity, because the patient presents with comorbidites  that affect occupational performance and required significant modification of tasks or assistance with consideration of multiple treatment options.  The Barthel Index was used as a functional outcome tool presenting with a score of Barthel Index Score: 45, indicating marked limitations of functional mobility and ADLS.  The patient's raw score on the AM-PAC Daily Activity Inpatient Short Form is 19. A raw score of greater than or equal to 19 suggests the patient may benefit from discharge to home however pt lives alone and due to minimum assistance required at this time would benefit from post-acute rehabilitation services. Please refer to the recommendation of the Occupational Therapist for safe discharge planning.  Patient will benefit from skilled Occupational Therapy services to address above deficits and facilitate a safe return to prior level of function.   Goals   Patient Goals to get better   STG Time Frame   (1-7 days)   Short Term Goal  Goals established to promote Patient Goals: to get better: Grooming: independent seated; Bathing: supervision; Upper Body Dressing supervision; Lower Body Dressing: supervision; Toileting: supervision; Patient will increase ambulatory standard toilet transfer to supervision with rolling walker to increase performance and safety with ADLS and functional mobility; Patient will increase standing tolerance to 10 minutes during ADL task to decrease assistance level and decrease fall risk; Patient will increase bed mobility to independent in preparation for ADLS and transfers; Patient will increase functional mobility to and from bathroom with rolling walker with supervision to increase performance with ADLS and to use a toilet; Patient will tolerate 10 minutes of UE ROM/strengthening to increase general activity tolerance and performance in ADLS/IADLS; Patient will improve functional activity tolerance to 10 minutes of sustained functional tasks to increase participation in basic  self-care and decrease assistance level; Patient will increase dynamic sitting balance to fair to improve the ability to sit at edge of bed or on a chair for ADLS;  Patient will increase dynamic standing balance to fair to improve postural stability and decrease fall risk during standing ADLS and transfers.   LTG Time Frame   (8-14 days)   Long Term Goal Bathing: independent; Upper Body Dressing independent; Lower Body Dressing: independent; Toileting: independent; Patient will increase ambulatory standard toilet transfer to independent with rolling walker to increase performance and safety with ADLS and functional mobility; Patient will increase standing tolerance to 20 minutes during ADL task to decrease assistance level and decrease fall risk; Patient will increase functional mobility to and from bathroom with rolling walker independently to increase performance with ADLS and to use a toilet; Patient will tolerate 20 minutes of UE ROM/strengthening to increase general activity tolerance and performance in ADLS/IADLS; Patient will improve functional activity tolerance to 20 minutes of sustained functional tasks to increase participation in basic self-care and decrease assistance level; Patient will increase dynamic sitting balance to fair+ to improve the ability to sit at edge of bed or on a chair for ADLS;  Patient will increase dynamic standing balance to fair+ to improve postural stability and decrease fall risk during standing ADLS and transfers.  Pt will score >/= 23/24 on AM-PAC Daily Activity Inpatient scale to promote safe independence with ADLs and functional mobility; Pt will score >/= 75/100 on Barthel Index in order to decrease caregiver assistance needed and increase ability to perform ADLs and functional mobility.   Plan   Treatment Interventions ADL retraining;Functional transfer training;UE strengthening/ROM;Endurance training;Patient/family training;Equipment  evaluation/education;Activityengagement;Continued evaluation;Compensatory technique education   Goal Expiration Date 05/25/24   OT Frequency 3-5x/wk   Discharge Recommendation   Rehab Resource Intensity Level, OT II (Moderate Resource Intensity)   AM-PAC Daily Activity Inpatient   Lower Body Dressing 3   Bathing 3   Toileting 3   Upper Body Dressing 3   Grooming 3   Eating 4   Daily Activity Raw Score 19   Daily Activity Standardized Score (Calc for Raw Score >=11) 40.22   AM-PAC Applied Cognition Inpatient   Following a Speech/Presentation 3   Understanding Ordinary Conversation 4   Taking Medications 3   Remembering Where Things Are Placed or Put Away 4   Remembering List of 4-5 Errands 3   Taking Care of Complicated Tasks 3   Applied Cognition Raw Score 20   Applied Cognition Standardized Score 41.76   Barthel Index   Feeding 10   Bathing 0   Grooming Score 0   Dressing Score 5   Bladder Score 5   Bowels Score 10   Toilet Use Score 5   Transfers (Bed/Chair) Score 10   Mobility (Level Surface) Score 0   Stairs Score 0   Barthel Index Score 45   Licensure   [place] License Number  Cassie Kiarra OTR/L [id number]

## 2024-05-11 NOTE — ASSESSMENT & PLAN NOTE
BP on admission 176/96mmHg    BP Readings from Last 3 Encounters:   05/11/24 121/73   05/06/24 147/90   04/23/24 138/70     Home medications include lisinopril 10 mg daily, Lopressor 50 mg twice daily, Ranexa 500mg q 12 hourly  Initially held all home medications due to low MAP  Restarting home meds now that BP elevated again  Continue to monitor blood pressure

## 2024-05-11 NOTE — H&P
"History and Physical - [facility]  Family [facility]     Patient Information: Gregg Partida 64 y.o. male MRN: [id number]  Unit/Bed#: [address] [place] [address] Encounter: [id number]  Admitting Physician: Samuel Thompson MD   PCP: Korin Lo MD  Date of Admission:  05/11/24     Assessment and Plan     * Systemic inflammatory response syndrome (SIRS) (HCC)  Assessment & Plan  Patient brought to ED by police in an intoxicated state. He was found outside of a resident building and brought to the hospital to \"become sober\"    Met SIRS criteria with tachypnea, Hypotension, tachycardia    ED course: 2.5 L NS, Zofran, Mg sulphate, home meds including metoprolol, lisinopril, nanexa  Labs: CBC: WNL, CMP: cr 1.11, , ALT 74, Lipase 85, Mg 1.3, LA 1.9. Coma Panel: ethanol 174     Plan  Hold all BP meds, due to low BP  Continue to monitor BP as patient continue to stay on the softer side  Follow Bcx and Ucx  Follow UA  Monitor vital signs and I/Os  CIWA protocol  Aspiration precautions, Fall precautions  CMP, BMP, Mg, Coagulation panel AM    Alcohol intoxication (HCC)  Assessment & Plan  A/e/b  ethanol 174  Exam significant for considerable tremors, drowsy state, AO x 2  H/o chronic daily alcohol consumption, denies h/o withdrawal seizures  Last drink: 05/09/24, 7-8 glasses of vodka    CIWA score: 11, ativan given    Plan  CIWA protocol  Thiamine IV, folic acid and multivitamin  Continue to monitor signs of withdrawal      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 disease (HCC)  Assessment & Plan  Lab Results   Component Value Date    HGBA1C 5.4 01/24/2024       Recent Labs     05/10/24  1746   POCGLU 94       Blood Sugar Average: Last 72 hrs:  (P) 94    Plan:  Hold metformin  ISS  Accuchecks ACHS  Continue to monitor blood glucose levels  CHO carb control diet    Hypotension  Assessment & Plan  Patient was hypotensive in the ED : 82/46mmHg MAP 59  S/p 2.5 L NS  MAP stabilized to " 92  Continue to hold BP medications : Ranexa, lisinopril and metoprolol  Continue to monitor BP    Hypertension  Assessment & Plan  BP on admission 176/96mmHg    BP Readings from Last 3 Encounters:   05/11/24 121/73   05/06/24 147/90   04/23/24 138/70     Home medications include lisinopril 10 mg daily, Lopressor 50 mg twice daily, Ranexa 500mg q 12 hourly  Hold all home medications for now  Continue to monitor blood pressure    Sleep apnea  Assessment & Plan  Patient does not have a CPAP at home    CPAP ordered    GERD (gastroesophageal reflux disease)  Assessment & Plan  Chronic, stable  Continue home Protonix    Paroxysmal atrial fibrillation (HCC)  Assessment & Plan  Not in Afib currently  EKG: NSR, tachycardia  Continue Eliquis    Fatty liver, alcoholic  Assessment & Plan  Hepatic steatosis on CT A/P 11/30  Ast 137, ALT 74  Alcohol abstinence education    Prostate cancer (HCC)  Assessment & Plan  Patient has a history of prostate cancer, s/p radiation      Chronic heart failure with preserved ejection fraction (HCC)  Assessment & Plan  Wt Readings from Last 3 Encounters:   05/11/24 82.1 kg (181 lb)   04/23/24 82.6 kg (182 lb)   04/16/24 82.6 kg (182 lb 1.6 oz)     Last echo 09/23 : Normal systolic function, EF 60%, diastolic heart failure grade 2  Patient does not appear to be in exacerbation at this time    Continue to monitor Daily weights and I/Os        Dyslipidemia  Assessment & Plan  Chronic, stable  Last lipid panel 11 months ago: Cholesterol 190, TG 71,     Continue home dose of Pravastatin 40 mg    Hx of CABG  Assessment & Plan  Patient had a prior triple bypass surgery in 2004    Home meds include: Pravastatin, aspirin  Continue home meds    Depression, recurrent (HCC)  Assessment & Plan  No home medications, self medicates with alcohol        Hypomagnesemia  Assessment & Plan  Patient's Mg on admission: 1.3  IV mag 2gm  Continue to monitor  Replete as needed    COPD (chronic obstructive  "pulmonary disease) ([facility])  Assessment & Plan  Patient has a long history of COPD  He smokes 1 pack a day  He doesn't remember the last time he needed an inhaler    Continue Breo ellipta and albuterol PRN home med  CXR         Fluids: 2.5 L of NS  Electrolyte repletion: Replete Mg now, and as needed.  Nutrition:        Diet Orders   (From admission, onward)                 Start     Ordered    05/11/24 0117  Diet Cardiovascular; Cardiac; Consistent Carbohydrate Diet Level 2 (5 carb servings/75 grams CHO/meal)  Diet effective now        References:    Adult Nutrition Support Algorithm    RD Therapeutic Diet Order Protocol   Question Answer Comment   Diet Type Cardiovascular    Cardiac Cardiac    Other Restriction(s): Consistent Carbohydrate Diet Level 2 (5 carb servings/75 grams CHO/meal)    RD to adjust diet per protocol? Yes    Special Instructions Aspiration precautions        05/11/24 0116                   VTE Prophylaxis:   High Risk (Score >/= 5) - Pharmacological DVT Prophylaxis Ordered: apixaban (Eliquis). Sequential Compression Devices Ordered.  Code Status: Level 1 - Full Code  Anticipated Length of Stay:  Patient will be admitted on an Observation basis with an anticipated length of stay of  less than 2 midnights.     Justification for Hospital Stay: Alcohol withdrawal and SIRS  Total Time for Visit, including Counseling / Coordination of Care: 35 mins. Greater than 50% of this total time spent on direct patient counseling and coordination of care.    Chief Complaint:     Chief Complaint   Patient presents with    Alcohol Intoxication     Pt brought in by ambulance after pt was reportedly found by police on a picnic table outside of resident at [facility].  Pt reports coming to the hospital \"To get Sober\".  Pt denies any chest pain, SOb or other discomfort.  Pt reports drinking Vodka today up to 30 minutes ago, and has been on a binge for the last 2 weeks.         Subjective      History of Present " Illness:     Gregg Partida is a 64 y.o. male with past history of hypertension, hyperlipidemia, COPD, CABG x 3, alcohol abuse, sleep apnea, atrial fibrillation on Eliquis, who presents with alcohol intoxication as witnessed by the police outside  a resident building. According to the patient, his last drink was yesterday night where he drank 7-8 glasses of vodka. He has been binge drinking for the last two weeks.  Currently patient denies any fever, chills, shortness of breath, chest pain, palpitations, light headedness, headaches, vision change, abdominal pain, N/V/D. Patient denies any falls or bodily injuries or head injury.  Patient has an ER visit on 05/06 with similar complaints and was admitted with similar symptoms on 03/25/2024.  Patient is noncompliant with follow-up with medications and endorses were not taking his Eliquis for the last 2 weeks.  Patient also endorses having difficulty in passing urine, but does not report any dysuria, fever.  Is a chronic alcoholic smokes 1 pack a day but does not do other drugs.           Review of Systems:  Review of Systems   Constitutional:  Positive for fatigue. Negative for chills and fever.   HENT:  Negative for ear pain and sore throat.    Eyes:  Negative for pain and visual disturbance.   Respiratory:  Positive for cough. Negative for shortness of breath.    Cardiovascular:  Negative for chest pain, palpitations and leg swelling.   Gastrointestinal:  Positive for nausea. Negative for abdominal pain and vomiting.   Endocrine: Negative.    Genitourinary:  Negative for dysuria and hematuria.   Musculoskeletal:  Positive for gait problem. Negative for arthralgias and back pain.   Skin:  Positive for color change (bruises on the right arm). Negative for rash.   Neurological:  Positive for tremors and weakness. Negative for seizures, syncope and speech difficulty.   Psychiatric/Behavioral:  Positive for confusion.    All other systems reviewed and are negative.        Past Medical History:   Diagnosis Date    Acute on chronic kidney failure  (HCC)     Alcohol withdrawal (HCC) 06/07/2019    Atrial fibrillation (HCC)     Cancer (HCC)     prostate ca,had radiation    Cardiac disease     stents,then triple bypass    COPD (chronic obstructive pulmonary disease) (HCC)     Coronary artery disease     ETOH abuse     Heart failure (HCC)     History of heart surgery     says triple bypass [facility]    Hx of heart artery stent     2014    Hyperlipidemia     Hypertension     Hypovolemic shock ([facility]) 12/22/2019    Lumbar spondylitis ([facility]) 10/13/2022    Nasal bone fracture 10/10/2022    Prostate CA ([facility])     S/P CABG x 3     2004    Sleep apnea      Past Surgical History:   Procedure Laterality Date    CARDIAC CATHETERIZATION      2 stents    CORONARY ARTERY BYPASS GRAFT  2004    WI ARTHRD ANT INTERBODY MIN DSC CRV BELOW C2 N/A 12/16/2020    Procedure: Anterior cervical discectomy with fusion C4-C7; Posterior cervical decompression and fusion C2-T2;  Surgeon: David Rowell MD;  Location: [facility];  Service: Neurosurgery    TONSILLECTOMY       No Known Allergies  Prior to Admission Medications   Prescriptions Last Dose Informant Patient Reported? Taking?   Blood Glucose Monitoring Suppl (ONE TOUCH ULTRA MINI) w/Device KIT Unknown Self Yes No   Sig: Use as directed   Patient not taking: Reported on 3/25/2024   Blood Pressure Monitoring (Adult Blood Pressure Cuff Lg) KIT Unknown  No No   Sig: Use in the morning   Patient not taking: Reported on 3/25/2024   Breo Ellipta 200-25 MCG/ACT inhaler Unknown Self No No   Sig: Inhale 1 puff daily Rinse mouth after use.   Patient not taking: Reported on 3/25/2024   ONETOUCH DELICA LANCETS FINE MISC Unknown Self Yes No   Sig: 3 (three) times a day Test   Thiamine HCl (vitamin B-1) 100 MG TABS Unknown Self No No   Sig: TAKE 1 TABLET DAILY   Patient not taking: Reported on 3/25/2024   albuterol (Ventolin HFA) 90 mcg/act inhaler Unknown Self No No    Sig: Inhale 2 puffs every 4 (four) hours as needed for wheezing   Patient not taking: Reported on 3/25/2024   aluminum-magnesium hydroxide-simethicone (MAALOX) 2353-7124-767 mg/30 mL suspension Unknown  No No   Sig: Take 30 mL by mouth every 4 (four) hours as needed for indigestion or heartburn   Patient not taking: Reported on 3/25/2024   apixaban (ELIQUIS) 5 mg Unknown  No No   Sig: Take 1 tablet (5 mg total) by mouth 2 (two) times a day Do not start before January 31, 2024.   aspirin (ECOTRIN LOW STRENGTH) 81 mg EC tablet Unknown Self Yes No   Sig: Take 81 mg by mouth daily   ferrous sulfate 325 (65 Fe) mg tablet Unknown Self No No   Sig: Take 1 tablet (325 mg total) by mouth daily with breakfast   folic acid (FOLVITE) 1 mg tablet Unknown  No No   Sig: TAKE 1 TABLET DAILY   gabapentin (NEURONTIN) 300 mg capsule Unknown  No No   Sig: TAKE ONE CAPSULE THREE TIMES DAILY   lisinopril (ZESTRIL) 20 mg tablet Unknown Self No No   Sig: Take 0.5 tablets (10 mg total) by mouth daily   magnesium Oxide (MAG-OX) 400 mg TABS Unknown  No No   Sig: Take 1 tablet (400 mg total) by mouth 2 (two) times a day   metFORMIN (GLUCOPHAGE) 500 mg tablet Unknown  No No   Sig: TAKE 1 TABLET DAILY WITH BREAKFAST   metoprolol tartrate (LOPRESSOR) 50 mg tablet Unknown  No No   Sig: Take 1 tablet (50 mg total) by mouth every 12 (twelve) hours   naltrexone (REVIA) 50 mg tablet Unknown Self No No   Sig: Take 1 tablet (50 mg total) by mouth daily   Patient not taking: Reported on 3/25/2024   nicotine (NICODERM CQ) 21 mg/24 hr TD 24 hr patch Unknown Self No No   Sig: Place 1 patch on the skin over 24 hours daily Do not start before December 4, 2023.   Patient not taking: Reported on 3/25/2024   pantoprazole (PROTONIX) 40 mg tablet Unknown  No No   Sig: TAKE 1 TABLET TWO TIMES A DAY   pravastatin (PRAVACHOL) 40 mg tablet Unknown Self No No   Sig: TAKE 1 TABLET DAILY WITH DINNER   ranolazine (RANEXA) 500 mg 12 hr tablet Unknown  No No   Sig: TAKE 1  TABLET EVERY 12 HOURS   senna-docusate sodium (SENOKOT S) 8.6-50 mg per tablet Unknown  No No   Sig: Take 1 tablet by mouth daily as needed for constipation   Patient not taking: Reported on 3/25/2024   tamsulosin (FLOMAX) 0.4 mg Unknown  No No   Sig: TAKE 1 CAPSULE DAILY   varenicline (CHANTIX) 1 mg tablet Unknown  No No   Sig: TAKE 1 TABLET 2 TIMES A DAY   Patient not taking: Reported on 3/25/2024      Facility-Administered Medications: None     Social History     Socioeconomic History    Marital status: Single     Spouse name: Not on file    Number of children: Not on file    Years of education: Not on file    Highest education level: Not on file   Occupational History    Occupation: contractor, 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)   Tobacco Use    Smoking status: Every Day     Current packs/day: 1.50     Average packs/day: 1.5 packs/day for 40.0 years (60.0 ttl pk-yrs)     Types: Cigarettes    Smokeless tobacco: Never   Vaping Use    Vaping status: Never Used   Substance and Sexual Activity    Alcohol use: Yes     Alcohol/week: 4.0 standard drinks of alcohol     Types: 4 Standard drinks or equivalent per week     Comment: quart of vodka daily    Drug use: No    Sexual activity: Not Currently   Other Topics Concern    Not on file   Social History Narrative    Not on file     Social Determinants of Health     Financial Resource Strain: Low Risk  (11/2/2023)    Overall Financial Resource Strain (CARDIA)     Difficulty of Paying Living Expenses: Not very hard   Food Insecurity: No Food Insecurity (5/11/2024)    Hunger Vital Sign     Worried About Running Out of Food in the Last Year: Never true     Ran Out of Food in the Last Year: Never true   Transportation Needs: No Transportation Needs (5/11/2024)    PRAPARE - Transportation     Lack of Transportation (Medical): No     Lack of Transportation (Non-Medical): No   Physical Activity: Not on file   Stress: No Stress Concern Present (11/2/2023)    [place]  "[place] [employer] - Occupational Stress Questionnaire     Feeling of Stress : Not at all   Social Connections: Unknown (4/15/2021)    Social Connection and Isolation Panel [NHANES]     Frequency of Communication with Friends and Family: More than three times a week     Frequency of Social Gatherings with Friends and Family: Not on file     Attends [employer] Services: Not on file     Active Member of Clubs or Organizations: Not on file     Attends Club or Organization Meetings: Not on file     Marital Status: Not on file   Intimate Partner Violence: Not on file   Housing Stability: Low Risk  (5/11/2024)    Housing Stability Vital Sign     Unable to Pay for Housing in the Last Year: No     Number of Places Lived in the Last Year: 1     Unstable Housing in the Last Year: No     Family History   Problem Relation Age of Onset    Diabetes Mother     Uterine cancer Mother     COPD Father     Hypertension Father         Patient Pre-hospital Living Situation: Home  Patient Pre-hospital Level of Mobility: Independent, started using a walker  Patient Pre-hospital Diet Restrictions: None          Objective     Physical Exam:   Vitals:   Blood Pressure: 130/74 (05/11/24 0051)  Pulse: 76 (05/11/24 0051)  Temperature: 98 °F (36.7 °C) (05/11/24 0015)  Temp Source: Oral (05/11/24 0015)  Respirations: 20 (05/11/24 0015)  Height: 6' 2\" (188 cm) (05/11/24 0015)  Weight - Scale: 82.1 kg (181 lb) (05/11/24 0015)  SpO2: 94 % (05/11/24 0100)     Physical Exam  Vitals reviewed.   Constitutional:       Appearance: Normal appearance. He is normal weight.   HENT:      Mouth/Throat:      Mouth: Mucous membranes are moist.   Cardiovascular:      Rate and Rhythm: Normal rate and regular rhythm.      Pulses: Normal pulses.      Heart sounds: Normal heart sounds. No murmur heard.  Pulmonary:      Effort: Pulmonary effort is normal. No respiratory distress.      Breath sounds: Normal breath sounds.   Abdominal:      General: Bowel " "sounds are normal. There is no distension.      Palpations: Abdomen is soft.      Tenderness: There is no abdominal tenderness. There is no right CVA tenderness or left CVA tenderness.   Musculoskeletal:         General: Normal range of motion.      Right lower leg: No edema.      Left lower leg: No edema.   Skin:     Capillary Refill: Capillary refill takes less than 2 seconds.      Findings: Bruising (on the arms) present.   Neurological:      General: No focal deficit present.      Mental Status: He is alert and oriented to person, place, and time.      Motor: No weakness.   Psychiatric:      Comments: Patient intoxicated. Difficult to assess. Looking for his walker.             Lab Results: I have personally reviewed pertinent reports.    Results from last 7 days   Lab Units 05/10/24  1445   WBC Thousand/uL 6.30   HEMOGLOBIN g/dL 14.0   HEMATOCRIT % 40.6   PLATELETS Thousands/uL 107*   SEGS PCT % 62   LYMPHO PCT % 24   MONO PCT % 10   EOS PCT % 1     Results from last 7 days   Lab Units 05/10/24  1445   POTASSIUM mmol/L 5.0   CHLORIDE mmol/L 100   CO2 mmol/L 23   BUN mg/dL 21   CREATININE mg/dL 1.11   CALCIUM mg/dL 8.6   ALK PHOS U/L 99   ALT U/L 74*   AST U/L 137*   EGFR ml/min/1.73sq m 70   MAGNESIUM mg/dL 1.3*         Results from last 7 days   Lab Units 05/10/24  2336   LACTIC ACID mmol/L 1.9                    Invalid input(s): \"URIBILINOGEN\"                    Imaging: I have personally reviewed pertinent reports.    No results found.      EKG, Pathology, and Other Studies Reviewed on Admission:   EKG  Result Date: 05/11/24  Impression:  NSR, tachycardia, QTC normal                ** Please Note: This note has been constructed using a voice recognition system **     Doron Reyes MD  05/11/24  1:38 AM   "

## 2024-05-11 NOTE — ASSESSMENT & PLAN NOTE
Lab Results   Component Value Date    HGBA1C 5.4 01/24/2024       Plan:  Hold metformin  ISS  Accuchecks ACHS  Continue to monitor blood glucose levels  CHO carb control diet

## 2024-05-11 NOTE — ASSESSMENT & PLAN NOTE
Wt Readings from Last 3 Encounters:   05/11/24 82.1 kg (181 lb)   04/23/24 82.6 kg (182 lb)   04/16/24 82.6 kg (182 lb 1.6 oz)     Last echo 09/23 : Normal systolic function, EF 60%, diastolic heart failure grade 2  Patient does not appear to be in exacerbation at this time    Continue to monitor Daily weights and I/Os

## 2024-05-11 NOTE — ASSESSMENT & PLAN NOTE
Patient has a long history of COPD  He smokes 1 pack a day  He doesn't remember the last time he needed an inhaler  - CXR: mild pulmonary venous congestion. No aspiration.   Continue Breo ellipta and albuterol PRN home med   I have personally seen and examined this patient.  I have fully participated in the care of this patient. I have reviewed all pertinent clinical information, including history, physical exam, plan and the Resident’s note and agree except as noted.

## 2024-05-11 NOTE — ED NOTES
Assumed care of patient. Found to be laying on left side with blood pressure cuff on the right arm, blood pressure cuff moved to the patient's left arm, systolic improved to low 100s.      Rebecca Rodriguez RN  05/11/24 0001

## 2024-05-11 NOTE — ASSESSMENT & PLAN NOTE
"Patient brought to ED by police in an intoxicated state. He was found outside of a resident building and brought to the hospital to \"become sober\"    Met SIRS criteria with tachypnea, Hypotension, tachycardia    ED course: 2.5 L NS, Zofran, Mg sulphate, home meds including metoprolol, lisinopril, nanexa  Labs: CBC WNL, Cr 1.11, , ALT 74, LA 1.9, ethanol 174, UA neg     Plan  F/U BCx  Monitor vital signs and I/Os  CIWA protocol  IVF  Aspiration precautions, Fall precautions  "

## 2024-05-11 NOTE — QUICK NOTE
Patient seen and evaluated in ED7 for concerns of hypotension. BP cuff was seen on forearm with arm hanging off the bed. BP cuff was re-positioned to upper arm and cycled x2 with SBP > 100.    In addition, patient appears clinically hypovolemic. Would recommend additional IVF +/- Albumin and hold on any additional cardiac medication.     Lastly, patient would benefit from additional magnesium repletion.     Contact CC with any additional questions/ concerns.

## 2024-05-11 NOTE — PHYSICAL THERAPY NOTE
PT EVALUATION       05/11/24 0920   PT Last Visit   PT Visit Date 05/11/24   Note Type   Note type Evaluation   Pain Assessment   Pain Assessment Tool 0-10   Pain Score No Pain   Patient's Stated Pain Goal No pain   Multiple Pain Sites No   Restrictions/Precautions   Weight Bearing Precautions Per Order No   Other Precautions Bed Alarm;Chair Alarm;Fall Risk;Pain   Home Living   Type of Home Apartment   Home Layout One level;Able to live on main level with bedroom/bathroom;Performs ADLs on one level;Elevator   Bathroom Toilet Standard   Home Equipment Walker  (reports he is only using occasionally)   Prior Function   Level of [place] Independent with ADLs;Independent with functional mobility;Needs assistance with IADLS  (Does not drive, otherwise IND at home ; uses public transportation)   Lives With Alone   Receives Help From Family   IADLs Independent with meal prep;Independent with medication management;Family/Friend/Other provides transportation   Falls in the last 6 months 1 to 4  (Pt reports approx 3-4 falls ; unable to report when he has most recent fall)   General   Additional Pertinent History Pt is a 64 year-old male who was admitted to the hospital on 5/10/24 due to alcohol intoxication   Family/Caregiver Present No   Cognition   Arousal/Participation Cooperative   Orientation Level Oriented X4  (increased time)   Following Commands Follows multistep commands with increased time or repetition   Subjective   Subjective Pt agreeable to PT session this AM.   RLE Assessment   RLE Assessment   (Hip/knee grossly 4/5 ; Ankle 3-/5 (pt reports chronic))   LLE Assessment   LLE Assessment WFL  (Grossly 4/5)   Bed Mobility   Supine to Sit 5  Supervision   Additional items Assist x 1;Verbal cues;Increased time required;Bedrails;HOB elevated  (+ full body tremors when moving to EOB)   Sit to Supine 5  Supervision   Additional items Assist x 1;Verbal cues;Increased time required;Bedrails;HOB elevated   Transfers    Sit to Stand 4  Minimal assistance   Additional items Assist x 1;Verbal cues;Increased time required   Stand to Sit 4  Minimal assistance   Additional items Assist x 1;Verbal cues;Increased time required  (Poor eccentric control to sit, decreased safety awareness)   Ambulation/Elevation   Gait pattern Wide BIN;Foward flexed;Short stride;Step through pattern  (Decreased gait speed ; full body tremors upon standing which decreased as session progressed)   Gait Assistance 4  Minimal assist   Additional items Assist x 1;Verbal cues   Assistive Device Rolling walker   Distance 25 feet with change of direction within room   Stair Management Assistance Not tested   Balance   Static Sitting Fair   Dynamic Sitting Fair -   Static Standing Fair -   Dynamic Standing Fair -   Ambulatory Poor +  (RW)   Activity Tolerance   Activity Tolerance Patient tolerated treatment well;Patient limited by fatigue   Nurse Made Aware yes CHRISTIE Love   Assessment   Prognosis Good   Problem List Decreased strength;Decreased endurance;Impaired balance;Decreased mobility;Decreased coordination;Impaired judgement;Decreased safety awareness;Pain   Assessment Patient seen for Physical Therapy evaluation. Patient admitted with Systemic inflammatory response syndrome (SIRS) (HCC).  Comorbidities affecting patient's physical performance include: Afib, CABG, CAD, cancer, cardiac disease, CKD, COPD, diabetes, falls, GERD, hypertension, and hypothyroid.  Personal factors affecting patient at time of initial evaluation include: lives in 1 story house, ambulating with assistive device, inability to navigate community distances, inability to navigate level surfaces without external assistance, limited home support, positive fall history, limited insight into impairments, inability to perform ADLS, inability to perform IADLS , and inability to live alone. Prior to admission, patient was independent with functional mobility with intermittent use of walker,  independent with ADLS, requiring assist for IADLS, living alone in 1 story home with 0 (elevator) steps to enter, and ambulating household distance.  Please find objective findings from Physical Therapy assessment regarding body systems outlined above with impairments and limitations including weakness, impaired balance, decreased endurance, impaired coordination, gait deviations, pain, decreased activity tolerance, decreased functional mobility tolerance, decreased safety awareness, impaired judgement, and fall risk.  The Barthel Index was used as a functional outcome tool presenting with a score of Barthel Index Score: 45 today indicating marked limitations of functional mobility and ADLS.  Patient's clinical presentation is currently unstable/unpredictable as seen in patient's presentation of increased fall risk, new onset of impairment of functional mobility, and decreased endurance. Pt would benefit from continued Physical Therapy treatment to address deficits as defined above and maximize level of functional mobility. As demonstrated by objective findings, the assigned level of complexity for this evaluation is high.The patient's -[facility] Basic Mobility Inpatient Short Form Raw Score is 16. A Raw score of less than or equal to 16 suggests the patient may benefit from discharge to post-acute rehabilitation services. Please also refer to the recommendation of the Physical Therapist for safe discharge planning.   Goals   Patient Goals to feel better   STG Expiration Date 05/18/24   Short Term Goal #1 Pt will perform bed mobility/transfers IND ; Pt will ambulate x 50 feet with supervision + RW ; Standing balance will improve to fair to decrease risk of falls ; AMPAC score will improve >18/24 to demonstrate improved functional independence   LTG Expiration Date 05/25/24   Long Term Goal #1 Pt will ambulate x 150 feet with supervision + RW ; Standing balance will improve to fair+ to decrease risk of falls ; AMPAC  score will improve >20/24 to demonstrate improved functional independence   Plan   Treatment/Interventions ADL retraining;Functional transfer training;LE strengthening/ROM;Therapeutic exercise;Endurance training;Gait training;Bed mobility;Spoke to nursing;OT   PT Frequency Other (Comment)  (5x/week)   Discharge Recommendation   Rehab Resource Intensity Level, PT II (Moderate Resource Intensity)   AM-PAC Basic Mobility Inpatient   Turning in Flat Bed Without Bedrails 3   Lying on Back to Sitting on Edge of Flat Bed Without Bedrails 3   Moving Bed to Chair 3   Standing Up From Chair Using Arms 3   Walk in Room 3   Climb 3-5 Stairs With Railing 1   Basic Mobility Inpatient Raw Score 16   Basic Mobility Standardized Score 38.32   [facility] Highest Level Of Mobility   -HLM Goal 5: Stand one or more mins   -HLM Achieved 7: Walk 25 feet or more   Barthel Index   Feeding 10   Bathing 0   Grooming Score 0   Dressing Score 5   Bladder Score 5   Bowels Score 10   Toilet Use Score 5   Transfers (Bed/Chair) Score 10   Mobility (Level Surface) Score 0   Stairs Score 0   Barthel Index Score 45   End of Consult   Patient Position at End of Consult Supine;All needs within reach;Bed/Chair alarm activated   Licensure   [place] License Number  Ursula Anne [id number]

## 2024-05-12 PROBLEM — I95.9 HYPOTENSION: Status: RESOLVED | Noted: 2024-03-25 | Resolved: 2024-05-12

## 2024-05-12 LAB
2HR DELTA HS TROPONIN: 261 NG/L
ANION GAP SERPL CALCULATED.3IONS-SCNC: 5 MMOL/L (ref 4–13)
APTT 1H NP PPP: 33 SECONDS (ref 24–36)
APTT IMM NP PPP: 32 SECONDS (ref 24–36)
APTT PPP: 31 SECONDS (ref 23–37)
APTT PPP: 38 SECONDS (ref 23–37)
BASOPHILS # BLD AUTO: 0.06 THOUSANDS/ÂΜL (ref 0–0.1)
BASOPHILS NFR BLD AUTO: 1 % (ref 0–1)
BUN SERPL-MCNC: 20 MG/DL (ref 5–25)
CALCIUM SERPL-MCNC: 8.3 MG/DL (ref 8.4–10.2)
CARDIAC TROPONIN I PNL SERPL HS: 1098 NG/L
CARDIAC TROPONIN I PNL SERPL HS: 837 NG/L
CARDIAC TROPONIN I PNL SERPL HS: 984 NG/L (ref 8–18)
CHLORIDE SERPL-SCNC: 100 MMOL/L (ref 96–108)
CO2 SERPL-SCNC: 27 MMOL/L (ref 21–32)
CREAT SERPL-MCNC: 1.07 MG/DL (ref 0.6–1.3)
EOSINOPHIL # BLD AUTO: 0.17 THOUSAND/ÂΜL (ref 0–0.61)
EOSINOPHIL NFR BLD AUTO: 4 % (ref 0–6)
ERYTHROCYTE [DISTWIDTH] IN BLOOD BY AUTOMATED COUNT: 13.9 % (ref 11.6–15.1)
FIBRINOGEN PPP-MCNC: 297 MG/DL (ref 207–520)
FOLATE SERPL-MCNC: 20 NG/ML
GFR SERPL CREATININE-BSD FRML MDRD: 73 ML/MIN/1.73SQ M
GLUCOSE SERPL-MCNC: 113 MG/DL (ref 65–140)
GLUCOSE SERPL-MCNC: 122 MG/DL (ref 65–140)
GLUCOSE SERPL-MCNC: 125 MG/DL (ref 65–140)
GLUCOSE SERPL-MCNC: 141 MG/DL (ref 65–140)
GLUCOSE SERPL-MCNC: 153 MG/DL (ref 65–140)
HCT VFR BLD AUTO: 34.2 % (ref 36.5–49.3)
HGB BLD-MCNC: 11.4 G/DL (ref 12–17)
IMM GRANULOCYTES # BLD AUTO: 0.02 THOUSAND/UL (ref 0–0.2)
IMM GRANULOCYTES NFR BLD AUTO: 1 % (ref 0–2)
INR PPP: 1.15 (ref 0.84–1.19)
LYMPHOCYTES # BLD AUTO: 1.04 THOUSANDS/ÂΜL (ref 0.6–4.47)
LYMPHOCYTES NFR BLD AUTO: 24 % (ref 14–44)
MAGNESIUM SERPL-MCNC: 1.5 MG/DL (ref 1.9–2.7)
MCH RBC QN AUTO: 33.5 PG (ref 26.8–34.3)
MCHC RBC AUTO-ENTMCNC: 33.3 G/DL (ref 31.4–37.4)
MCV RBC AUTO: 101 FL (ref 82–98)
MONOCYTES # BLD AUTO: 0.43 THOUSAND/ÂΜL (ref 0.17–1.22)
MONOCYTES NFR BLD AUTO: 10 % (ref 4–12)
NEUTROPHILS # BLD AUTO: 2.69 THOUSANDS/ÂΜL (ref 1.85–7.62)
NEUTS SEG NFR BLD AUTO: 60 % (ref 43–75)
NRBC BLD AUTO-RTO: 0 /100 WBCS
PLATELET # BLD AUTO: 57 THOUSANDS/UL (ref 149–390)
PMV BLD AUTO: 10.6 FL (ref 8.9–12.7)
POTASSIUM SERPL-SCNC: 4.1 MMOL/L (ref 3.5–5.3)
PROTHROMBIN TIME: 14.9 SECONDS (ref 11.6–14.5)
RBC # BLD AUTO: 3.4 MILLION/UL (ref 3.88–5.62)
SODIUM SERPL-SCNC: 132 MMOL/L (ref 135–147)
THROMBIN TIME: 16.7 SECONDS (ref 14.7–18.4)
VIT B12 SERPL-MCNC: 898 PG/ML (ref 180–914)
WBC # BLD AUTO: 4.41 THOUSAND/UL (ref 4.31–10.16)

## 2024-05-12 PROCEDURE — 80048 BASIC METABOLIC PNL TOTAL CA: CPT

## 2024-05-12 PROCEDURE — 93005 ELECTROCARDIOGRAM TRACING: CPT

## 2024-05-12 PROCEDURE — 83735 ASSAY OF MAGNESIUM: CPT

## 2024-05-12 PROCEDURE — 99232 SBSQ HOSP IP/OBS MODERATE 35: CPT | Performed by: INTERNAL MEDICINE

## 2024-05-12 PROCEDURE — 85384 FIBRINOGEN ACTIVITY: CPT

## 2024-05-12 PROCEDURE — 84484 ASSAY OF TROPONIN QUANT: CPT

## 2024-05-12 PROCEDURE — 82948 REAGENT STRIP/BLOOD GLUCOSE: CPT

## 2024-05-12 PROCEDURE — 85025 COMPLETE CBC W/AUTO DIFF WBC: CPT

## 2024-05-12 PROCEDURE — 85670 THROMBIN TIME PLASMA: CPT

## 2024-05-12 PROCEDURE — 85732 THROMBOPLASTIN TIME PARTIAL: CPT

## 2024-05-12 PROCEDURE — 85730 THROMBOPLASTIN TIME PARTIAL: CPT

## 2024-05-12 PROCEDURE — 85610 PROTHROMBIN TIME: CPT

## 2024-05-12 RX ORDER — RANOLAZINE 500 MG/1
500 TABLET, EXTENDED RELEASE ORAL EVERY 12 HOURS SCHEDULED
Status: DISCONTINUED | OUTPATIENT
Start: 2024-05-12 | End: 2024-05-13 | Stop reason: HOSPADM

## 2024-05-12 RX ORDER — NITROGLYCERIN 0.4 MG/1
0.4 TABLET SUBLINGUAL
Status: DISCONTINUED | OUTPATIENT
Start: 2024-05-12 | End: 2024-05-13 | Stop reason: HOSPADM

## 2024-05-12 RX ORDER — SODIUM CHLORIDE 9 MG/ML
125 INJECTION, SOLUTION INTRAVENOUS CONTINUOUS
Status: DISCONTINUED | OUTPATIENT
Start: 2024-05-12 | End: 2024-05-12

## 2024-05-12 RX ORDER — LANOLIN ALCOHOL/MO/W.PET/CERES
3 CREAM (GRAM) TOPICAL
Status: DISCONTINUED | OUTPATIENT
Start: 2024-05-12 | End: 2024-05-13 | Stop reason: HOSPADM

## 2024-05-12 RX ORDER — METOPROLOL TARTRATE 50 MG/1
50 TABLET, FILM COATED ORAL EVERY 12 HOURS SCHEDULED
Status: DISCONTINUED | OUTPATIENT
Start: 2024-05-12 | End: 2024-05-13 | Stop reason: HOSPADM

## 2024-05-12 RX ORDER — LORAZEPAM 2 MG/ML
4 INJECTION INTRAMUSCULAR ONCE
Status: COMPLETED | OUTPATIENT
Start: 2024-05-12 | End: 2024-05-12

## 2024-05-12 RX ORDER — MAGNESIUM SULFATE HEPTAHYDRATE 40 MG/ML
2 INJECTION, SOLUTION INTRAVENOUS ONCE
Status: COMPLETED | OUTPATIENT
Start: 2024-05-12 | End: 2024-05-12

## 2024-05-12 RX ORDER — DILTIAZEM HYDROCHLORIDE 5 MG/ML
10 INJECTION INTRAVENOUS ONCE
Status: COMPLETED | OUTPATIENT
Start: 2024-05-12 | End: 2024-05-12

## 2024-05-12 RX ORDER — METOPROLOL TARTRATE 1 MG/ML
5 INJECTION, SOLUTION INTRAVENOUS ONCE
Status: COMPLETED | OUTPATIENT
Start: 2024-05-12 | End: 2024-05-12

## 2024-05-12 RX ORDER — SODIUM CHLORIDE 9 MG/ML
75 INJECTION, SOLUTION INTRAVENOUS CONTINUOUS
Status: DISCONTINUED | OUTPATIENT
Start: 2024-05-12 | End: 2024-05-12

## 2024-05-12 RX ORDER — LISINOPRIL 10 MG/1
10 TABLET ORAL DAILY
Status: DISCONTINUED | OUTPATIENT
Start: 2024-05-12 | End: 2024-05-13 | Stop reason: HOSPADM

## 2024-05-12 RX ADMIN — LIDOCAINE 5% 1 PATCH: 700 PATCH TOPICAL at 08:38

## 2024-05-12 RX ADMIN — METOPROLOL TARTRATE 50 MG: 50 TABLET, FILM COATED ORAL at 22:43

## 2024-05-12 RX ADMIN — Medication 1 TABLET: at 08:36

## 2024-05-12 RX ADMIN — INSULIN LISPRO 1 UNITS: 100 INJECTION, SOLUTION INTRAVENOUS; SUBCUTANEOUS at 22:00

## 2024-05-12 RX ADMIN — THIAMINE HYDROCHLORIDE 100 MG: 100 INJECTION, SOLUTION INTRAMUSCULAR; INTRAVENOUS at 11:19

## 2024-05-12 RX ADMIN — FLUTICASONE FUROATE AND VILANTEROL TRIFENATATE 1 PUFF: 200; 25 POWDER RESPIRATORY (INHALATION) at 08:39

## 2024-05-12 RX ADMIN — TAMSULOSIN HYDROCHLORIDE 0.4 MG: 0.4 CAPSULE ORAL at 16:42

## 2024-05-12 RX ADMIN — LISINOPRIL 10 MG: 10 TABLET ORAL at 13:58

## 2024-05-12 RX ADMIN — METOPROLOL TARTRATE 50 MG: 50 TABLET, FILM COATED ORAL at 08:36

## 2024-05-12 RX ADMIN — RANOLAZINE 500 MG: 500 TABLET, FILM COATED, EXTENDED RELEASE ORAL at 22:43

## 2024-05-12 RX ADMIN — APIXABAN 5 MG: 5 TABLET, FILM COATED ORAL at 17:01

## 2024-05-12 RX ADMIN — FOLIC ACID 1 MG: 1 TABLET ORAL at 08:36

## 2024-05-12 RX ADMIN — RANOLAZINE 500 MG: 500 TABLET, FILM COATED, EXTENDED RELEASE ORAL at 13:58

## 2024-05-12 RX ADMIN — GABAPENTIN 300 MG: 300 CAPSULE ORAL at 08:36

## 2024-05-12 RX ADMIN — SODIUM CHLORIDE 125 ML/HR: 0.9 INJECTION, SOLUTION INTRAVENOUS at 07:38

## 2024-05-12 RX ADMIN — DILTIAZEM HYDROCHLORIDE 10 MG: 5 INJECTION INTRAVENOUS at 19:12

## 2024-05-12 RX ADMIN — PANTOPRAZOLE SODIUM 40 MG: 40 TABLET, DELAYED RELEASE ORAL at 08:36

## 2024-05-12 RX ADMIN — Medication 3 MG: at 22:43

## 2024-05-12 RX ADMIN — APIXABAN 5 MG: 5 TABLET, FILM COATED ORAL at 08:36

## 2024-05-12 RX ADMIN — PRAVASTATIN SODIUM 40 MG: 40 TABLET ORAL at 16:42

## 2024-05-12 RX ADMIN — LORAZEPAM 4 MG: 2 INJECTION INTRAMUSCULAR; INTRAVENOUS at 22:58

## 2024-05-12 RX ADMIN — MAGNESIUM SULFATE HEPTAHYDRATE 2 G: 40 INJECTION, SOLUTION INTRAVENOUS at 07:41

## 2024-05-12 RX ADMIN — NICOTINE 1 PATCH: 21 PATCH, EXTENDED RELEASE TRANSDERMAL at 08:39

## 2024-05-12 RX ADMIN — METOPROLOL TARTRATE 5 MG: 5 INJECTION INTRAVENOUS at 18:27

## 2024-05-12 NOTE — PLAN OF CARE
Problem: PAIN - ADULT  Goal: Verbalizes/displays adequate comfort level or baseline comfort level  Description: Interventions:  - Encourage patient to monitor pain and request assistance  - Assess pain using appropriate pain scale  - Administer analgesics based on type and severity of pain and evaluate response  - Implement non-pharmacological measures as appropriate and evaluate response  - Consider cultural and social influences on pain and pain management  - Notify physician/advanced practitioner if interventions unsuccessful or patient reports new pain  Outcome: Progressing     Problem: INFECTION - ADULT  Goal: Absence or prevention of progression during hospitalization  Description: INTERVENTIONS:  - Assess and monitor for signs and symptoms of infection  - Monitor lab/diagnostic results  - Monitor all insertion sites, i.e. indwelling lines, tubes, and drains  - Monitor endotracheal if appropriate and nasal secretions for changes in amount and color  - [place] appropriate cooling/warming therapies per order  - Administer medications as ordered  - Instruct and encourage patient and family to use good hand hygiene technique  - Identify and instruct in appropriate isolation precautions for identified infection/condition  Outcome: Progressing  Goal: Absence of fever/infection during neutropenic period  Description: INTERVENTIONS:  - Monitor WBC    Outcome: Progressing     Problem: SAFETY ADULT  Goal: Patient will remain free of falls  Description: INTERVENTIONS:  - Educate patient/family on patient safety including physical limitations  - Instruct patient to call for assistance with activity   - Consult OT/PT to assist with strengthening/mobility   - Keep Call bell within reach  - Keep bed low and locked with side rails adjusted as appropriate  - Keep care items and personal belongings within reach  - Initiate and maintain comfort rounds  - Make Fall Risk Sign visible to staff  - Offer Toileting every 2 Hours,  in advance of need  - Initiate/Maintain bed/chair alarm/ yellow socks and bracelet   - Obtain necessary fall risk management equipment: bed and chair alarm   - Apply yellow socks and bracelet for high fall risk patients  - Consider moving patient to room near nurses station  Outcome: Progressing  Goal: Maintain or return to baseline ADL function  Description: INTERVENTIONS:  -  Assess patient's ability to carry out ADLs; assess patient's baseline for ADL function and identify physical deficits which impact ability to perform ADLs (bathing, care of mouth/teeth, toileting, grooming, dressing, etc.)  - Assess/evaluate cause of self-care deficits   - Assess range of motion  - Assess patient's mobility; develop plan if impaired  - Assess patient's need for assistive devices and provide as appropriate  - Encourage maximum independence but intervene and supervise when necessary  - Involve family in performance of ADLs  - Assess for home care needs following discharge   - Consider OT consult to assist with ADL evaluation and planning for discharge  - Provide patient education as appropriate  Outcome: Progressing  Goal: Maintains/Returns to pre admission functional level  Description: INTERVENTIONS:  - Perform AM-PAC 6 Click Basic Mobility/ Daily Activity assessment daily.  - Set and communicate daily mobility goal to care team and patient/family/caregiver.   - Collaborate with rehabilitation services on mobility goals if consulted  - Perform Range of Motion 3 times a day.  - Reposition patient every 2 hours.  - Dangle patient 3 times a day  - Stand patient 3 times a day  - Ambulate patient 3 times a day  - Out of bed to chair 3 times a day   - Out of bed for meals 3 times a day  - Out of bed for toileting  - Record patient progress and toleration of activity level   Outcome: Progressing     Problem: DISCHARGE PLANNING  Goal: Discharge to home or other facility with appropriate resources  Description: INTERVENTIONS:  -  Identify barriers to discharge w/patient and caregiver  - Arrange for needed discharge resources and transportation as appropriate  - Identify discharge learning needs (meds, wound care, etc.)  - Arrange for interpretive services to assist at discharge as needed  - Refer to Case Management Department for coordinating discharge planning if the patient needs post-hospital services based on physician/advanced practitioner order or complex needs related to functional status, cognitive ability, or social support system  Outcome: Progressing     Problem: Knowledge Deficit  Goal: Patient/family/caregiver demonstrates understanding of disease process, treatment plan, medications, and discharge instructions  Description: Complete learning assessment and assess knowledge base.  Interventions:  - Provide teaching at level of understanding  - Provide teaching via preferred learning methods  Outcome: Progressing     Problem: Potential for Falls  Goal: Patient will remain free of falls  Description: INTERVENTIONS:  - Educate patient/family on patient safety including physical limitations  - Instruct patient to call for assistance with activity   - Consult OT/PT to assist with strengthening/mobility   - Keep Call bell within reach  - Keep bed low and locked with side rails adjusted as appropriate  - Keep care items and personal belongings within reach  - Initiate and maintain comfort rounds  - Make Fall Risk Sign visible to staff  - Offer Toileting every 2 Hours, in advance of need  - Initiate/Maintain bed and chair alarm  - Obtain necessary fall risk management equipment: bed and chair alarm  - Apply yellow socks and bracelet for high fall risk patients  - Consider moving patient to room near nurses station  Outcome: Progressing     Problem: Prexisting or High Potential for Compromised Skin Integrity  Goal: Skin integrity is maintained or improved  Description: INTERVENTIONS:  - Identify patients at risk for skin breakdown  -  Assess and monitor skin integrity  - Assess and monitor nutrition and hydration status  - Monitor labs   - Assess for incontinence   - Turn and reposition patient  - Assist with mobility/ambulation  - Relieve pressure over bony prominences  - Avoid friction and shearing  - Provide appropriate hygiene as needed including keeping skin clean and dry  - Evaluate need for skin moisturizer/barrier cream  - Collaborate with interdisciplinary team   - Patient/family teaching  - Consider wound care consult   Outcome: Progressing

## 2024-05-12 NOTE — PROGRESS NOTES
"Daily Progress Note - [facility]  Family Medicine Residency  Gregg Partida 64 y.o. male MRN: [id number]  Unit/Bed#: [address] [place] [address] Encounter: [id number]  Admitting Physician: Samuel Thompson MD   PCP: Korin Lo MD  Date of Admission:  5/10/2024  2:19 PM    Assessment and Plan    * Systemic inflammatory response syndrome (SIRS) ([facility])  Assessment & Plan  Patient brought to ED by police in an intoxicated state. He was found outside of a resident building and brought to the hospital to \"become sober\"    Met SIRS criteria with tachypnea, Hypotension, tachycardia    ED course: 2.5 L NS, Zofran, Mg sulphate, home meds including metoprolol, lisinopril, nanexa  Labs: CBC WNL, Cr 1.11, , ALT 74, LA 1.9, ethanol 174, UA neg     Plan  F/U BCx  Monitor vital signs and I/Os  CIWA protocol  IVF  Aspiration precautions, Fall precautions    Alcohol intoxication ([facility])  Assessment & Plan  A/e/b  ethanol 174  Exam significant for considerable tremors, drowsy state, AO x 2  H/o chronic daily alcohol consumption, denies h/o withdrawal seizures  Last drink: 05/09/24, 7-8 glasses of vodka    CIWA score: 11, ativan given    Plan  CIWA protocol  Thiamine IV, folic acid and multivitamin  Continue to monitor signs of withdrawal      COPD (chronic obstructive pulmonary disease) ([facility])  Assessment & Plan  Patient has a long history of COPD  He smokes 1 pack a day  He doesn't remember the last time he needed an inhaler  - CXR: mild pulmonary venous congestion. No aspiration.   Continue Breo ellipta and albuterol PRN home med    Hypomagnesemia  Assessment & Plan  Patient's Mg on admission: 1.3  Continue to monitor  Replete as needed    Hypertension  Assessment & Plan  BP on admission 176/96mmHg    BP Readings from Last 3 Encounters:   05/11/24 121/73   05/06/24 147/90   04/23/24 138/70     Home medications include lisinopril 10 mg daily, Lopressor 50 mg twice daily, Ranexa 500mg q 12 hourly  Initially held all " home medications due to low MAP  Restarting home meds now that BP elevated again  Continue to monitor blood pressure    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 disease (HCC)  Assessment & Plan  Lab Results   Component Value Date    HGBA1C 5.4 01/24/2024       Plan:  Hold metformin  ISS  Accuchecks ACHS  Continue to monitor blood glucose levels  CHO carb control diet    Sleep apnea  Assessment & Plan  Patient does not have a CPAP at home    CPAP ordered    GERD (gastroesophageal reflux disease)  Assessment & Plan  Chronic, stable  Continue home Protonix    Paroxysmal atrial fibrillation (HCC)  Assessment & Plan  Not in Afib currently  EKG: NSR, tachycardia  Continue Eliquis    Fatty liver, alcoholic  Assessment & Plan  Hepatic steatosis on CT A/P 11/30  - Ast 137, ALT 74  Alcohol abstinence education    Prostate cancer (HCC)  Assessment & Plan  Patient has a history of prostate cancer, s/p radiation      Chronic heart failure with preserved ejection fraction (HCC)  Assessment & Plan  Wt Readings from Last 3 Encounters:   05/11/24 82.1 kg (181 lb)   04/23/24 82.6 kg (182 lb)   04/16/24 82.6 kg (182 lb 1.6 oz)     Last echo 09/23 : Normal systolic function, EF 60%, diastolic heart failure grade 2  Patient does not appear to be in exacerbation at this time    Continue to monitor Daily weights and I/Os    Dyslipidemia  Assessment & Plan  Chronic, stable  Last lipid panel 11 months ago: Cholesterol 190, TG 71,     Continue home dose of Pravastatin 40 mg    Hx of CABG  Assessment & Plan  Patient had a prior triple bypass surgery in 2004    Home meds include: Pravastatin, aspirin  Continue home meds    Depression, recurrent (HCC)  Assessment & Plan  No home medications, self medicates with alcohol    Hypotension-resolved as of 5/12/2024  Assessment & Plan  Patient was hypotensive in the ED : 82/46mmHg MAP 59  S/p 2.5 L NS  MAP stabilized to 92  Continue to hold BP medications : Ranexa, lisinopril and  metoprolol  Continue to monitor BP        VTE Pharmacologic Prophylaxis:   High Risk (Score >/= 5) - Pharmacological DVT Prophylaxis Ordered: apixaban (Eliquis). Sequential Compression Devices Ordered.    Patient Centered Rounds: I have performed bedside rounds with nursing staff today.    Discussions with Specialists or Other Care Team Provider: None    Education and Discussions with Family / Patient: Yes  Patient Information Sharing: With the consent of Gregg Partida , their loved ones were notified today by inpatient team of the patient’s condition and current plan.  All questions answered.     Time Spent for Care: 20 minutes.  More than 50% of total time spent on counseling and coordination of care as described above.    Current Length of Stay: 1 day(s)    Current Patient Status: Inpatient   Certification Statement: The patient will continue to require additional inpatient hospital stay due to CIWA    Discharge Plan: 24-48hr    Code Status: Level 1 - Full Code    Subjective:   Patient seen and examined at bedside NAD, AAOx3. Reports tolerating meals well with normal urinary/bowel habits. Expresses desire to go home, but still presenting with tremors and required a total of 10mg of Ativan in the last 24hrs per CIWA protocol. Denies chest pain, headache, blurred vision, SOB, abdominal pain, NVD.     Objective     Objective:   Vitals:   Temp (24hrs), Av.2 °F (36.8 °C), Min:97.8 °F (36.6 °C), Max:98.5 °F (36.9 °C)    Temp:  [97.8 °F (36.6 °C)-98.5 °F (36.9 °C)] 97.8 °F (36.6 °C)  HR:  [58-88] 76  Resp:  [17-18] 17  BP: (120-176)/() 167/79  SpO2:  [94 %-98 %] 95 %  Body mass index is 24.73 kg/m².     Input and Output Summary (last 24 hours):       Intake/Output Summary (Last 24 hours) at 2024 1159  Last data filed at 2024 1143  Gross per 24 hour   Intake --   Output 1950 ml   Net -1950 ml       Physical Exam:   Physical Exam  Constitutional:       General: He is not in acute distress.  HENT:       Mouth/Throat:      Mouth: Mucous membranes are moist.      Pharynx: Oropharynx is clear.   Eyes:      Conjunctiva/sclera: Conjunctivae normal.      Pupils: Pupils are equal, round, and reactive to light.   Cardiovascular:      Rate and Rhythm: Normal rate and regular rhythm.      Pulses: Normal pulses.      Heart sounds: Normal heart sounds.   Pulmonary:      Effort: Pulmonary effort is normal. No respiratory distress.      Breath sounds: No wheezing, rhonchi or rales.   Abdominal:      General: Bowel sounds are normal. There is no distension.      Palpations: Abdomen is soft.   Musculoskeletal:      Right lower leg: No edema.      Left lower leg: No edema.   Skin:     General: Skin is warm and dry.      Capillary Refill: Capillary refill takes less than 2 seconds.   Neurological:      General: No focal deficit present.      Mental Status: He is alert and oriented to person, place, and time.      Motor: No weakness.      Comments: tremor         Additional Data:     Labs:  Results from last 7 days   Lab Units 05/12/24  0329   WBC Thousand/uL 4.41   HEMOGLOBIN g/dL 11.4*   HEMATOCRIT % 34.2*   PLATELETS Thousands/uL 57*   SEGS PCT % 60   LYMPHO PCT % 24   MONO PCT % 10   EOS PCT % 4     Results from last 7 days   Lab Units 05/12/24  0329 05/11/24  0334   POTASSIUM mmol/L 4.1 4.7   CHLORIDE mmol/L 100 106   CO2 mmol/L 27 21   BUN mg/dL 20 19   CREATININE mg/dL 1.07 1.22   CALCIUM mg/dL 8.3* 7.7*   ALK PHOS U/L  --  73   ALT U/L  --  61*   AST U/L  --  121*     Results from last 7 days   Lab Units 05/12/24  0329   INR  1.15     Results from last 7 days   Lab Units 05/12/24  1135 05/12/24  0724 05/11/24  2051 05/11/24  1601 05/11/24  1153   POC GLUCOSE mg/dl 125 122 120 121 137           * I Have Reviewed All Lab Data Listed Above.  * Additional Pertinent Lab Tests Reviewed: All Labs For Current Hospital Admission Reviewed    Imaging:    Imaging Reports Reviewed Today Include: All Reports For Current Hospital Admission  Reviewed  Imaging Personally Reviewed by Myself Includes:  All Reports For Current Hospital Admission Reviewed    Recent Cultures (last 7 days):     Results from last 7 days   Lab Units 05/10/24  2354 05/10/24  2336   BLOOD CULTURE  Received in Microbiology Lab. Culture in Progress. Received in Microbiology Lab. Culture in Progress.       Last 24 Hours Medication List:   Current Facility-Administered Medications   Medication Dose Route Frequency Provider Last Rate    acetaminophen  975 mg Oral Q8H PRN Susana Kirk MD      albuterol  2 puff Inhalation Q4H PRN Doron Reyes MD      apixaban  5 mg Oral BID Doron Reyes MD      fluticasone-vilanterol  1 puff Inhalation Daily Doron Reyes MD      folic acid  1 mg Oral Daily Doron Reyes MD      gabapentin  300 mg Oral Daily Doron Reyes MD      insulin lispro  1-6 Units Subcutaneous 4x Daily (AC & HS) Doron Reyes MD      lidocaine  1 patch Topical Daily Rich Finn MD      lisinopril  10 mg Oral Daily Jonel Calvillo MD      metoprolol tartrate  50 mg Oral Q12H Counts include 234 beds at the [facility] Jonel Calvillo MD      multivitamin-minerals  1 tablet Oral Daily Doron Reyes MD      nicotine  1 patch Transdermal Daily Doron Reyes MD      ondansetron  4 mg Intravenous Q6H PRN Doron Reyes MD      pantoprazole  40 mg Oral Daily Doron Reyes MD      pravastatin  40 mg Oral Daily With Dinner Doron Reyes MD      ranolazine  500 mg Oral Q12H Counts include 234 beds at the [facility] Jonel Calvillo MD      sodium chloride  75 mL/hr Intravenous Continuous Jonel Calvillo MD      tamsulosin  0.4 mg Oral Daily With Dinner Doron Reyes MD      thiamine  100 mg Intravenous Daily Doron Reyes  mg (05/12/24 1119)             ** Please Note: Dictation voice to text software may have been used in the creation of this document. **    Jonel Calvillo MD  05/12/24  11:59 AM

## 2024-05-12 NOTE — QUICK NOTE
Informed by nurse that patient's blood pressure was 209/134 with heart rate of 145.  Patient also complaining of chest pain and palpitations. EKG showed sinus tachycardia with possible atrial flutter.  Gave IV Lopressor 5 mg, held IVF and ordered troponin.      UPDATE: Trops 984. Likely due to demand ischemia secondary to irregular heart rhythm. Gave IV Cardizem 10 mg due to HR remaining in 140's. Currently HR is sustained between . Chest pain has improved and currently 3/10. Will continue to monitor and trend troponin. Consider cardizem drip if HR remains uncontrolled.     To be co-signed by Dr. Thompson

## 2024-05-13 VITALS
SYSTOLIC BLOOD PRESSURE: 151 MMHG | RESPIRATION RATE: 20 BRPM | TEMPERATURE: 98.4 F | DIASTOLIC BLOOD PRESSURE: 88 MMHG | HEART RATE: 86 BPM | HEIGHT: 74 IN | WEIGHT: 189 LBS | BODY MASS INDEX: 24.26 KG/M2 | OXYGEN SATURATION: 96 %

## 2024-05-13 PROBLEM — R65.10 SYSTEMIC INFLAMMATORY RESPONSE SYNDROME (SIRS) (HCC): Status: RESOLVED | Noted: 2019-11-23 | Resolved: 2024-05-13

## 2024-05-13 PROBLEM — F10.929 ALCOHOL INTOXICATION (HCC): Status: RESOLVED | Noted: 2024-05-10 | Resolved: 2024-05-13

## 2024-05-13 PROBLEM — G92.8 TOXIC METABOLIC ENCEPHALOPATHY: Status: RESOLVED | Noted: 2021-04-21 | Resolved: 2024-05-13

## 2024-05-13 LAB
4HR DELTA HS TROPONIN: 150 NG/L
ANION GAP SERPL CALCULATED.3IONS-SCNC: 6 MMOL/L (ref 4–13)
ATRIAL RATE: 102 BPM
ATRIAL RATE: 114 BPM
ATRIAL RATE: 147 BPM
ATRIAL RATE: 149 BPM
BASOPHILS # BLD AUTO: 0.05 THOUSANDS/ÂΜL (ref 0–0.1)
BASOPHILS NFR BLD AUTO: 1 % (ref 0–1)
BUN SERPL-MCNC: 15 MG/DL (ref 5–25)
CALCIUM SERPL-MCNC: 9.2 MG/DL (ref 8.4–10.2)
CARDIAC TROPONIN I PNL SERPL HS: 987 NG/L
CHLORIDE SERPL-SCNC: 100 MMOL/L (ref 96–108)
CO2 SERPL-SCNC: 26 MMOL/L (ref 21–32)
CREAT SERPL-MCNC: 1.18 MG/DL (ref 0.6–1.3)
EOSINOPHIL # BLD AUTO: 0.17 THOUSAND/ÂΜL (ref 0–0.61)
EOSINOPHIL NFR BLD AUTO: 3 % (ref 0–6)
ERYTHROCYTE [DISTWIDTH] IN BLOOD BY AUTOMATED COUNT: 13.5 % (ref 11.6–15.1)
GFR SERPL CREATININE-BSD FRML MDRD: 65 ML/MIN/1.73SQ M
GLUCOSE SERPL-MCNC: 114 MG/DL (ref 65–140)
GLUCOSE SERPL-MCNC: 123 MG/DL (ref 65–140)
GLUCOSE SERPL-MCNC: 127 MG/DL (ref 65–140)
HCT VFR BLD AUTO: 34.9 % (ref 36.5–49.3)
HGB BLD-MCNC: 11.8 G/DL (ref 12–17)
IMM GRANULOCYTES # BLD AUTO: 0.01 THOUSAND/UL (ref 0–0.2)
IMM GRANULOCYTES NFR BLD AUTO: 0 % (ref 0–2)
LYMPHOCYTES # BLD AUTO: 0.68 THOUSANDS/ÂΜL (ref 0.6–4.47)
LYMPHOCYTES NFR BLD AUTO: 12 % (ref 14–44)
MAGNESIUM SERPL-MCNC: 1.3 MG/DL (ref 1.9–2.7)
MCH RBC QN AUTO: 33.2 PG (ref 26.8–34.3)
MCHC RBC AUTO-ENTMCNC: 33.8 G/DL (ref 31.4–37.4)
MCV RBC AUTO: 98 FL (ref 82–98)
MONOCYTES # BLD AUTO: 0.59 THOUSAND/ÂΜL (ref 0.17–1.22)
MONOCYTES NFR BLD AUTO: 11 % (ref 4–12)
NEUTROPHILS # BLD AUTO: 4.13 THOUSANDS/ÂΜL (ref 1.85–7.62)
NEUTS SEG NFR BLD AUTO: 73 % (ref 43–75)
NRBC BLD AUTO-RTO: 0 /100 WBCS
P AXIS: 80 DEGREES
P AXIS: 81 DEGREES
PLATELET # BLD AUTO: 54 THOUSANDS/UL (ref 149–390)
PMV BLD AUTO: 12.3 FL (ref 8.9–12.7)
POTASSIUM SERPL-SCNC: 4.2 MMOL/L (ref 3.5–5.3)
PR INTERVAL: 128 MS
PR INTERVAL: 130 MS
PR INTERVAL: 142 MS
PR INTERVAL: 158 MS
QRS AXIS: 101 DEGREES
QRS AXIS: 80 DEGREES
QRS AXIS: 83 DEGREES
QRS AXIS: 91 DEGREES
QRSD INTERVAL: 84 MS
QRSD INTERVAL: 86 MS
QRSD INTERVAL: 90 MS
QRSD INTERVAL: 94 MS
QT INTERVAL: 306 MS
QT INTERVAL: 326 MS
QT INTERVAL: 344 MS
QT INTERVAL: 356 MS
QTC INTERVAL: 448 MS
QTC INTERVAL: 449 MS
QTC INTERVAL: 481 MS
QTC INTERVAL: 557 MS
RBC # BLD AUTO: 3.55 MILLION/UL (ref 3.88–5.62)
SODIUM SERPL-SCNC: 132 MMOL/L (ref 135–147)
T WAVE AXIS: -50 DEGREES
T WAVE AXIS: 202 DEGREES
T WAVE AXIS: 68 DEGREES
T WAVE AXIS: 79 DEGREES
VENTRICULAR RATE: 102 BPM
VENTRICULAR RATE: 114 BPM
VENTRICULAR RATE: 147 BPM
VENTRICULAR RATE: 149 BPM
WBC # BLD AUTO: 5.63 THOUSAND/UL (ref 4.31–10.16)

## 2024-05-13 PROCEDURE — 93010 ELECTROCARDIOGRAM REPORT: CPT | Performed by: INTERNAL MEDICINE

## 2024-05-13 PROCEDURE — 99239 HOSP IP/OBS DSCHRG MGMT >30: CPT | Performed by: INTERNAL MEDICINE

## 2024-05-13 PROCEDURE — 84484 ASSAY OF TROPONIN QUANT: CPT

## 2024-05-13 PROCEDURE — 83735 ASSAY OF MAGNESIUM: CPT

## 2024-05-13 PROCEDURE — 80048 BASIC METABOLIC PNL TOTAL CA: CPT

## 2024-05-13 PROCEDURE — 85025 COMPLETE CBC W/AUTO DIFF WBC: CPT

## 2024-05-13 PROCEDURE — 82948 REAGENT STRIP/BLOOD GLUCOSE: CPT

## 2024-05-13 RX ORDER — HALOPERIDOL 5 MG/ML
1 INJECTION INTRAMUSCULAR ONCE
Status: COMPLETED | OUTPATIENT
Start: 2024-05-13 | End: 2024-05-13

## 2024-05-13 RX ORDER — OLANZAPINE 10 MG/2ML
5 INJECTION, POWDER, FOR SOLUTION INTRAMUSCULAR ONCE
Status: COMPLETED | OUTPATIENT
Start: 2024-05-13 | End: 2024-05-13

## 2024-05-13 RX ORDER — METOPROLOL TARTRATE 1 MG/ML
5 INJECTION, SOLUTION INTRAVENOUS ONCE
Status: COMPLETED | OUTPATIENT
Start: 2024-05-13 | End: 2024-05-13

## 2024-05-13 RX ORDER — OLANZAPINE 10 MG/2ML
2.5 INJECTION, POWDER, FOR SOLUTION INTRAMUSCULAR ONCE
Status: DISCONTINUED | OUTPATIENT
Start: 2024-05-13 | End: 2024-05-13

## 2024-05-13 RX ORDER — LORAZEPAM 2 MG/ML
4 INJECTION INTRAMUSCULAR ONCE
Status: COMPLETED | OUTPATIENT
Start: 2024-05-13 | End: 2024-05-13

## 2024-05-13 RX ORDER — MAGNESIUM SULFATE HEPTAHYDRATE 40 MG/ML
4 INJECTION, SOLUTION INTRAVENOUS ONCE
Status: COMPLETED | OUTPATIENT
Start: 2024-05-13 | End: 2024-05-13

## 2024-05-13 RX ORDER — ACETAMINOPHEN 10 MG/ML
1000 INJECTION, SOLUTION INTRAVENOUS ONCE
Status: COMPLETED | OUTPATIENT
Start: 2024-05-13 | End: 2024-05-13

## 2024-05-13 RX ADMIN — OLANZAPINE 5 MG: 10 INJECTION, POWDER, FOR SOLUTION INTRAMUSCULAR at 01:50

## 2024-05-13 RX ADMIN — THIAMINE HYDROCHLORIDE 100 MG: 100 INJECTION, SOLUTION INTRAMUSCULAR; INTRAVENOUS at 10:41

## 2024-05-13 RX ADMIN — MAGNESIUM SULFATE HEPTAHYDRATE 4 G: 40 INJECTION, SOLUTION INTRAVENOUS at 06:37

## 2024-05-13 RX ADMIN — LORAZEPAM 4 MG: 2 INJECTION INTRAMUSCULAR; INTRAVENOUS at 05:03

## 2024-05-13 RX ADMIN — RANOLAZINE 500 MG: 500 TABLET, FILM COATED, EXTENDED RELEASE ORAL at 10:41

## 2024-05-13 RX ADMIN — Medication 1 TABLET: at 10:41

## 2024-05-13 RX ADMIN — METOPROLOL TARTRATE 5 MG: 5 INJECTION INTRAVENOUS at 05:04

## 2024-05-13 RX ADMIN — GABAPENTIN 300 MG: 300 CAPSULE ORAL at 10:41

## 2024-05-13 RX ADMIN — NICOTINE 1 PATCH: 21 PATCH, EXTENDED RELEASE TRANSDERMAL at 10:41

## 2024-05-13 RX ADMIN — ACETAMINOPHEN 1000 MG: 10 INJECTION INTRAVENOUS at 03:09

## 2024-05-13 RX ADMIN — HALOPERIDOL LACTATE 1 MG: 5 INJECTION, SOLUTION INTRAMUSCULAR at 01:57

## 2024-05-13 RX ADMIN — APIXABAN 5 MG: 5 TABLET, FILM COATED ORAL at 10:41

## 2024-05-13 RX ADMIN — LISINOPRIL 10 MG: 10 TABLET ORAL at 10:41

## 2024-05-13 RX ADMIN — FOLIC ACID 1 MG: 1 TABLET ORAL at 10:41

## 2024-05-13 RX ADMIN — METOPROLOL TARTRATE 50 MG: 50 TABLET, FILM COATED ORAL at 10:52

## 2024-05-13 RX ADMIN — PANTOPRAZOLE SODIUM 40 MG: 40 TABLET, DELAYED RELEASE ORAL at 10:41

## 2024-05-13 NOTE — QUICK NOTE
Progress Note - Triage Asssessment   Gregg Partida 64 y.o. male MRN: [id number]    Time Called ( Time): 0159  Date Called: 05/13/24  Room#: 4N 416  Person requesting evaluation: Dr. Rabeea Fahran, MD. Dr. Antonino Moscoso MD.    Situation:    64 YOM with PMHx of alcohol abuse with multiple unsuccessful attempts at cessation, DMII, A fib A flutter presenting 5/10 acutely intoxicated and seeking assistance with his alcohol use disorder  Notified by primary team of increasing agitation with refusal of tele, aggression requiring control team and administration of locked 4 point restraints  Evaluation requested for ongoing aggressive behavior, concern for patient safety  Current Exam: disoriented to place, and situation, oriented to time and person. Patient previously believed he was at his apartment after seeking outpatient care for alcohol cessation. Reoriented and quickly returned to A and O times 4, and remained as such for the remainder of the conversation.   CIWA: No acute complaints of nausea and vomiting, no tactile disturbances. Currently without tremor. Declines auditory and visual disturbances. Appears minimally sweaty (possibly in setting of resisting restraints). States anxiety is a 2 out of 10. Endorses minimal headache. Current CIWA 5  Recent Medications: 5mg Zyprexa, 1mg IM Haldol, 4mg IV ativan at 2258    At this time, patients chief complaints include his low grade headache and feelings of thirst.     Interventions:   Given 14mg Ativan administered since beginning of hospitalization, transition to phenobarb administration may lead to respiratory compromise  Would avoid precedex given the potential to mask alcohol withdrawal symptoms, possible withdrawal seizures in a high risk patient  S/p Antipsychotics as above, recommend repeat EKG when able pending patients compliance prior to administering any additional antipsychotics. Patient educated on necessity of telemetry, stating he will  "\"think about it\".  Trial clear liquids, no acute complaints of nausea  Recommend slow de escalation of restraints as they are likely confounding patients agitation, patient educated on importance of complying with staff for patient and staff safety.   Given medical treatment with ativan, would recommend continuing current strategy with CIWA/ativan due to reported success with previous administrations  Consider trialing high dose thiamine therapy for possible Wernicke's given chronicity of patients alcohol abuse  In setting of headache, consider IV tylenol x1 vs IV fluid bolus    Please reach back out to [facility] (via [facility] role: [facility]) at any time for additional concerns or for re-evaluation.            Triage Assessment:     Recommend upgrading level of care to Step Down 2 for closer monitoring and observation. Rest of recommendations as above    Recommendations discussed with Dr. Doron Reyes, Dr. Antonino Moscoso      "

## 2024-05-13 NOTE — PLAN OF CARE
Problem: PAIN - ADULT  Goal: Verbalizes/displays adequate comfort level or baseline comfort level  Description: Interventions:  - Encourage patient to monitor pain and request assistance  - Assess pain using appropriate pain scale  - Administer analgesics based on type and severity of pain and evaluate response  - Implement non-pharmacological measures as appropriate and evaluate response  - Consider cultural and social influences on pain and pain management  - Notify physician/advanced practitioner if interventions unsuccessful or patient reports new pain  Outcome: Progressing     Problem: INFECTION - ADULT  Goal: Absence or prevention of progression during hospitalization  Description: INTERVENTIONS:  - Assess and monitor for signs and symptoms of infection  - Monitor lab/diagnostic results  - Monitor all insertion sites, i.e. indwelling lines, tubes, and drains  - Monitor endotracheal if appropriate and nasal secretions for changes in amount and color  - [place] appropriate cooling/warming therapies per order  - Administer medications as ordered  - Instruct and encourage patient and family to use good hand hygiene technique  - Identify and instruct in appropriate isolation precautions for identified infection/condition  Outcome: Progressing  Goal: Absence of fever/infection during neutropenic period  Description: INTERVENTIONS:  - Monitor WBC    Outcome: Progressing     Problem: SAFETY ADULT  Goal: Patient will remain free of falls  Description: INTERVENTIONS:  - Educate patient/family on patient safety including physical limitations  - Instruct patient to call for assistance with activity   - Consult OT/PT to assist with strengthening/mobility   - Keep Call bell within reach  - Keep bed low and locked with side rails adjusted as appropriate  - Keep care items and personal belongings within reach  - Initiate and maintain comfort rounds  - Make Fall Risk Sign visible to staff  - Offer Toileting every 2 Hours,  in advance of need  - Initiate/Maintain bed alarm  - Obtain necessary fall risk management equipment:   - Apply yellow socks and bracelet for high fall risk patients  - Consider moving patient to room near nurses station  Outcome: Progressing  Goal: Maintain or return to baseline ADL function  Description: INTERVENTIONS:  -  Assess patient's ability to carry out ADLs; assess patient's baseline for ADL function and identify physical deficits which impact ability to perform ADLs (bathing, care of mouth/teeth, toileting, grooming, dressing, etc.)  - Assess/evaluate cause of self-care deficits   - Assess range of motion  - Assess patient's mobility; develop plan if impaired  - Assess patient's need for assistive devices and provide as appropriate  - Encourage maximum independence but intervene and supervise when necessary  - Involve family in performance of ADLs  - Assess for home care needs following discharge   - Consider OT consult to assist with ADL evaluation and planning for discharge  - Provide patient education as appropriate  Outcome: Progressing  Goal: Maintains/Returns to pre admission functional level  Description: INTERVENTIONS:  - Perform AM-PAC 6 Click Basic Mobility/ Daily Activity assessment daily.  - Set and communicate daily mobility goal to care team and patient/family/caregiver.   - Collaborate with rehabilitation services on mobility goals if consulted  - Perform Range of Motion 3 times a day.  - Actively turns self.   - Dangle patient 3 times a day  - Stand patient 3 times a day  - Ambulate patient 3 times a day  - Out of bed to chair 3 times a day   - Out of bed for meals 3 times a day  - Out of bed for toileting  - Record patient progress and toleration of activity level   Outcome: Progressing     Problem: DISCHARGE PLANNING  Goal: Discharge to home or other facility with appropriate resources  Description: INTERVENTIONS:  - Identify barriers to discharge w/patient and caregiver  - Arrange for  needed discharge resources and transportation as appropriate  - Identify discharge learning needs (meds, wound care, etc.)  - Arrange for interpretive services to assist at discharge as needed  - Refer to Case Management Department for coordinating discharge planning if the patient needs post-hospital services based on physician/advanced practitioner order or complex needs related to functional status, cognitive ability, or social support system  Outcome: Progressing     Problem: Knowledge Deficit  Goal: Patient/family/caregiver demonstrates understanding of disease process, treatment plan, medications, and discharge instructions  Description: Complete learning assessment and assess knowledge base.  Interventions:  - Provide teaching at level of understanding  - Provide teaching via preferred learning methods  Outcome: Progressing     Problem: Potential for Falls  Goal: Patient will remain free of falls  Description: INTERVENTIONS:  - Educate patient/family on patient safety including physical limitations  - Instruct patient to call for assistance with activity   - Consult OT/PT to assist with strengthening/mobility   - Keep Call bell within reach  - Keep bed low and locked with side rails adjusted as appropriate  - Keep care items and personal belongings within reach  - Initiate and maintain comfort rounds  - Make Fall Risk Sign visible to staff  - Offer Toileting every 2 Hours, in advance of need  - Initiate/Maintain bed alarm  - Obtain necessary fall risk management equipment:   - Apply yellow socks and bracelet for high fall risk patients  - Consider moving patient to room near nurses station  Outcome: Progressing     Problem: RESPIRATORY - ADULT  Goal: Achieves optimal ventilation and oxygenation  Description: INTERVENTIONS:  - Assess for changes in respiratory status  - Assess for changes in mentation and behavior  - Position to facilitate oxygenation and minimize respiratory effort  - Oxygen administered by  appropriate delivery if ordered  - Initiate smoking cessation education as indicated  - Encourage broncho-pulmonary hygiene including cough, deep breathe, Incentive Spirometry  - Assess the need for suctioning and aspirate as needed  - Assess and instruct to report SOB or any respiratory difficulty  - Respiratory Therapy support as indicated  Outcome: Progressing     Problem: CARDIOVASCULAR - ADULT  Goal: Maintains optimal cardiac output and hemodynamic stability  Description: INTERVENTIONS:  - Monitor I/O, vital signs and rhythm  - Monitor for S/S and trends of decreased cardiac output  - Administer and titrate ordered vasoactive medications to optimize hemodynamic stability  - Assess quality of pulses, skin color and temperature  - Assess for signs of decreased coronary artery perfusion  - Instruct patient to report change in severity of symptoms  Outcome: Progressing  Goal: Absence of cardiac dysrhythmias or at baseline rhythm  Description: INTERVENTIONS:  - Continuous cardiac monitoring, vital signs, obtain 12 lead EKG if ordered  - Administer antiarrhythmic and heart rate control medications as ordered  - Monitor electrolytes and administer replacement therapy as ordered  Outcome: Progressing     Problem: Prexisting or High Potential for Compromised Skin Integrity  Goal: Skin integrity is maintained or improved  Description: INTERVENTIONS:  - Identify patients at risk for skin breakdown  - Assess and monitor skin integrity  - Assess and monitor nutrition and hydration status  - Monitor labs   - Assess for incontinence   - Turn and reposition patient  - Assist with mobility/ambulation  - Relieve pressure over bony prominences  - Avoid friction and shearing  - Provide appropriate hygiene as needed including keeping skin clean and dry  - Evaluate need for skin moisturizer/barrier cream  - Collaborate with interdisciplinary team   - Patient/family teaching  - Consider wound care consult   Outcome: Progressing      Problem: SAFETY, RESTRAINT - VIOLENT/SELF-DESTRUCTIVE  Goal: Remains free of harm/injury from restraints (Restraint for Violent/Self-Destructive Behavior)  Description: INTERVENTIONS:  - Instruct patient/family regarding restraint use   - Assess and monitor physiologic and psychological status   - Provide interventions and comfort measures to meet assessed patient needs   - Ensure continuous in person monitoring is provided   - Identify and implement measures to help patient regain control  - Assess readiness for release of restraint  Outcome: Progressing  Goal: Returns to optimal restraint-free functioning  Description: INTERVENTIONS:  - Assess the patient's behavior and symptoms that indicate continued need for restraint  - Identify and implement measures to help patient regain control  - Assess readiness for release of restraint   Outcome: Progressing

## 2024-05-13 NOTE — ASSESSMENT & PLAN NOTE
Due to agitation, aggressive behavior, confusion, AMS in the setting of Alcohol intoxication. Attempted to AMA overnight, deemed not capable of safe decision making due to recency of administration of benzos and high CIWA score.  Received with 1:1 observation, Ativan, Haldol, Zyprexa, IV Mag. IV Thiamine  Required violent restraints, discontinued today  Upgraded to SD 2.

## 2024-05-13 NOTE — PLAN OF CARE
Problem: PAIN - ADULT  Goal: Verbalizes/displays adequate comfort level or baseline comfort level  Description: Interventions:  - Encourage patient to monitor pain and request assistance  - Assess pain using appropriate pain scale  - Administer analgesics based on type and severity of pain and evaluate response  - Implement non-pharmacological measures as appropriate and evaluate response  - Consider cultural and social influences on pain and pain management  - Notify physician/advanced practitioner if interventions unsuccessful or patient reports new pain  Outcome: Progressing     Problem: INFECTION - ADULT  Goal: Absence or prevention of progression during hospitalization  Description: INTERVENTIONS:  - Assess and monitor for signs and symptoms of infection  - Monitor lab/diagnostic results  - Monitor all insertion sites, i.e. indwelling lines, tubes, and drains  - Monitor endotracheal if appropriate and nasal secretions for changes in amount and color  - [place] appropriate cooling/warming therapies per order  - Administer medications as ordered  - Instruct and encourage patient and family to use good hand hygiene technique  - Identify and instruct in appropriate isolation precautions for identified infection/condition  Outcome: Progressing  Goal: Absence of fever/infection during neutropenic period  Description: INTERVENTIONS:  - Monitor WBC    Outcome: Progressing     Problem: SAFETY ADULT  Goal: Patient will remain free of falls  Description: INTERVENTIONS:  - Educate patient/family on patient safety including physical limitations  - Instruct patient to call for assistance with activity   - Consult OT/PT to assist with strengthening/mobility   - Keep Call bell within reach  - Keep bed low and locked with side rails adjusted as appropriate  - Keep care items and personal belongings within reach  - Initiate and maintain comfort rounds  - Make Fall Risk Sign visible to staff  - Offer Toileting every 2 Hours,  in advance of need  - Initiate/Maintain bed alarm  - Obtain necessary fall risk management equipment: fall risk sign on door  - Apply yellow socks and bracelet for high fall risk patients  - Consider moving patient to room near nurses station  Outcome: Progressing  Goal: Maintain or return to baseline ADL function  Description: INTERVENTIONS:  -  Assess patient's ability to carry out ADLs; assess patient's baseline for ADL function and identify physical deficits which impact ability to perform ADLs (bathing, care of mouth/teeth, toileting, grooming, dressing, etc.)  - Assess/evaluate cause of self-care deficits   - Assess range of motion  - Assess patient's mobility; develop plan if impaired  - Assess patient's need for assistive devices and provide as appropriate  - Encourage maximum independence but intervene and supervise when necessary  - Involve family in performance of ADLs  - Assess for home care needs following discharge   - Consider OT consult to assist with ADL evaluation and planning for discharge  - Provide patient education as appropriate  Outcome: Progressing  Goal: Maintains/Returns to pre admission functional level  Description: INTERVENTIONS:  - Perform AM-PAC 6 Click Basic Mobility/ Daily Activity assessment daily.  - Set and communicate daily mobility goal to care team and patient/family/caregiver.   - Collaborate with rehabilitation services on mobility goals if consulted  - Perform Range of Motion 2 times a day.  - Reposition patient every 2 hours.  - Dangle patient 2 times a day  - Stand patient 2 times a day  - Ambulate patient 2 times a day  - Out of bed to chair 3 times a day   - Out of bed for meals 3 times a day  - Out of bed for toileting  - Record patient progress and toleration of activity level   Outcome: Progressing     Problem: DISCHARGE PLANNING  Goal: Discharge to home or other facility with appropriate resources  Description: INTERVENTIONS:  - Identify barriers to discharge  w/patient and caregiver  - Arrange for needed discharge resources and transportation as appropriate  - Identify discharge learning needs (meds, wound care, etc.)  - Arrange for interpretive services to assist at discharge as needed  - Refer to Case Management Department for coordinating discharge planning if the patient needs post-hospital services based on physician/advanced practitioner order or complex needs related to functional status, cognitive ability, or social support system  Outcome: Progressing     Problem: Knowledge Deficit  Goal: Patient/family/caregiver demonstrates understanding of disease process, treatment plan, medications, and discharge instructions  Description: Complete learning assessment and assess knowledge base.  Interventions:  - Provide teaching at level of understanding  - Provide teaching via preferred learning methods  Outcome: Progressing     Problem: Potential for Falls  Goal: Patient will remain free of falls  Description: INTERVENTIONS:  - Educate patient/family on patient safety including physical limitations  - Instruct patient to call for assistance with activity   - Consult OT/PT to assist with strengthening/mobility   - Keep Call bell within reach  - Keep bed low and locked with side rails adjusted as appropriate  - Keep care items and personal belongings within reach  - Initiate and maintain comfort rounds  - Make Fall Risk Sign visible to staff  - Offer Toileting every 2 Hours, in advance of need  - Initiate/Maintain bed alarm  - Obtain necessary fall risk management equipment: fall risk sign on door  - Apply yellow socks and bracelet for high fall risk patients  - Consider moving patient to room near nurses station  Outcome: Progressing     Problem: RESPIRATORY - ADULT  Goal: Achieves optimal ventilation and oxygenation  Description: INTERVENTIONS:  - Assess for changes in respiratory status  - Assess for changes in mentation and behavior  - Position to facilitate  oxygenation and minimize respiratory effort  - Oxygen administered by appropriate delivery if ordered  - Initiate smoking cessation education as indicated  - Encourage broncho-pulmonary hygiene including cough, deep breathe, Incentive Spirometry  - Assess the need for suctioning and aspirate as needed  - Assess and instruct to report SOB or any respiratory difficulty  - Respiratory Therapy support as indicated  Outcome: Progressing     Problem: Prexisting or High Potential for Compromised Skin Integrity  Goal: Skin integrity is maintained or improved  Description: INTERVENTIONS:  - Identify patients at risk for skin breakdown  - Assess and monitor skin integrity  - Assess and monitor nutrition and hydration status  - Monitor labs   - Assess for incontinence   - Turn and reposition patient  - Assist with mobility/ambulation  - Relieve pressure over bony prominences  - Avoid friction and shearing  - Provide appropriate hygiene as needed including keeping skin clean and dry  - Evaluate need for skin moisturizer/barrier cream  - Collaborate with interdisciplinary team   - Patient/family teaching  - Consider wound care consult   Outcome: Progressing     Problem: SAFETY, RESTRAINT - VIOLENT/SELF-DESTRUCTIVE  Goal: Remains free of harm/injury from restraints (Restraint for Violent/Self-Destructive Behavior)  Description: INTERVENTIONS:  - Instruct patient/family regarding restraint use   - Assess and monitor physiologic and psychological status   - Provide interventions and comfort measures to meet assessed patient needs   - Ensure continuous in person monitoring is provided   - Identify and implement measures to help patient regain control  - Assess readiness for release of restraint  Outcome: Progressing  Goal: Returns to optimal restraint-free functioning  Description: INTERVENTIONS:  - Assess the patient's behavior and symptoms that indicate continued need for restraint  - Identify and implement measures to help  patient regain control  - Assess readiness for release of restraint   Outcome: Progressing     Problem: CARDIOVASCULAR - ADULT  Goal: Maintains optimal cardiac output and hemodynamic stability  Description: INTERVENTIONS:  - Monitor I/O, vital signs and rhythm  - Monitor for S/S and trends of decreased cardiac output  - Administer and titrate ordered vasoactive medications to optimize hemodynamic stability  - Assess quality of pulses, skin color and temperature  - Assess for signs of decreased coronary artery perfusion  - Instruct patient to report change in severity of symptoms  Outcome: Progressing  Goal: Absence of cardiac dysrhythmias or at baseline rhythm  Description: INTERVENTIONS:  - Continuous cardiac monitoring, vital signs, obtain 12 lead EKG if ordered  - Administer antiarrhythmic and heart rate control medications as ordered  - Monitor electrolytes and administer replacement therapy as ordered  Outcome: Progressing

## 2024-05-13 NOTE — PROGRESS NOTES
At about 0434, patient observed with /132 ( automatic). Rechecked manually, /120  Resident Dr. Moscoso notified via tiger text with new order to give IV Lopressor 5mg X 1 dose.  Patient agitated, attempting to get out of bed, banging bed with 4 point restraint in placed.

## 2024-05-13 NOTE — QUICK NOTE
"Control team announced overhead. At my arrival family medicine physicians and nursing staff at bedside with Mr. Partida. This provider had a prior encounter with the patient where he expressed his desired to leave \"for a quick smoke\" but family physicians, this provider, nursing staff and security guards explained due to benzodiazepine administration the patient was not able to leave hospital premises. At that time 1:1 in person was placed for patient safety, however patient became violent and aggressive towards nursing staff requiring locked restraints. His son listed in chart was called by this provider to offer a health update via teams but unable to reach, a voicemail with a call back number was left. Many interventions were applied prior lock restraints but unfortunately the patient was extremely agitated and combative. Critical care was consulted by family medicine. Appreciate CC input. Nurse at bedside was instructed by family medicine physicians and this provider to monitor the patient closely.   "

## 2024-05-13 NOTE — RESTRAINT FACE TO FACE
"Restraint Face to Face   Gregg Partida 64 y.o. male MRN: [id number]  Unit/Bed#: [address] [place] [address]- Encounter: [id number]    Physical Evaluation: Patient currently in 4 point restraint attempting to come out of bed and shaking bed. No diaphoresis or tremors noted.   Purpose for Restraints/ Seclusion High risk for self harm, High risk for causing significant disruption of treatment environment , High risk for harm to others, and high risk for flight  Patient's reaction to the intervention: increased agitation   Patient's medical condition: alcohol withdrawal   Patient's Behavioral condition: Agitated, demanding to go outside for a smoke, stating \"I will call the police on everyone.\"  Restraints to be Continued        "

## 2024-05-13 NOTE — DISCHARGE SUMMARY
Discharge AMA Summary - [facility]     Patient Information: Gregg Partida 64 y.o. male MRN: [id number]  Unit/Bed#: [address] [place] [address] Encounter: [id number]     Admitting Physician: Samuel Thompson MD  Discharging Physician/Practitioner: No att. providers found   PCP: Korin Lo MD  Admission Date:   Admission Orders (From admission, onward)       Ordered        05/11/24 1354  INPATIENT ADMISSION  Once            05/10/24 2331  Place in Observation  Once                          Discharge Date: 05/13/24 AMA     Reason for Admission: Alcohol intoxication (HCC) [F10.929]  Weakness [R53.1]  Hypotension [I95.9]     Discharge Diagnoses: AMA     Principal Problem:    Systemic inflammatory response syndrome (SIRS) (HCC)  Active Problems:    Toxic metabolic encephalopathy    COPD (chronic obstructive pulmonary disease) (HCC)    Alcohol intoxication (HCC)    Hypomagnesemia    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 disease (HCC)    Hypertension    Depression, recurrent (HCC)    Hx of CABG    Dyslipidemia    Chronic heart failure with preserved ejection fraction (HCC)    Prostate cancer (HCC)    Fatty liver, alcoholic    Paroxysmal atrial fibrillation (HCC)    GERD (gastroesophageal reflux disease)    Sleep apnea    Hypothyroidism  Resolved Problems:    Hypotension       Consultations During Hospital Stay:  ·       none     Procedures Performed:   ·       none     Significant Findings / Test Results:   5/13: Na 132, Mg 1.3, Hgb 11.8,   5/12: Mag 1.5, , K4.1, WBC 4.41, Hgb 11.4, Trop 837>1098>987  5/11: folate 20, b12 898, hgb 11.8 plt 85  5/10: WBC 6.3, Hg 14.0, Na 139, K 5.0, Cr 1.11, , ALT 74, Lipase 85, Mg 1.3, LA 1.9  Ethanol 174     Incidental Findings:   ·       none      Test Results Pending at Discharge (will require follow up):   ·       none     Outpatient Tests Requested:  ·       none     Outpatient follow-up Requested:  ·        PCP    Complications:  none    Things to address at first visit after hospitalization   Any desire for inpatient rehab?  Any symptoms of withdrawal?  Any chest pain, palpitations, SOB      Hospital Course: AMA  Gregg Partida is a 64 y.o. male patient with PMHx of hypertension, hyperlipidemia, COPD, CAD status post CABG, alcohol abuse, atrial fibrillation who originally presented to the hospital on 5/10/2024 BIB police due to public intoxication. In the ED patient was found to be hypertensive and tachycardic with electrolyte derangements, warranting admission for medical management. In the first 48hrs of admission patient required 14mg total of Ativan per CIWA protocol. Last night patient was aggressively attempting to leave against medical advice, but due to the proximity of receiving ativan due to high CIWA score, was deemed not safe or competent enough for AMA and was held with chemical and physical restraints. Shortly after his attempt to AMA, EKG was done showing atrial flutter and labs were drawn showing elevated troponin likely due to demand ischemia. Today patient insists on desiring to leave AMA despite extensive discussion about the risks of him clinically decompensating, up to and including death. Patient demonstrated that he is AAOX3 and expressed understanding of the risks potential risks associated with leaving. AMA consent form was signed by patient.    Toxic metabolic encephalopathy  Assessment & Plan  Due to agitation, aggressive behavior, confusion, AMS in the setting of Alcohol intoxication. Attempted to AMA overnight, deemed not capable of safe decision making due to recency of administration of benzos and high CIWA score.  Received with 1:1 observation, Ativan, Haldol, Zyprexa, IV Mag. IV Thiamine  Required violent restraints, discontinued today  Upgraded to SD 2.     * Systemic inflammatory response syndrome (SIRS) (HCC)  Assessment & Plan  Patient brought to ED by police in an intoxicated  "state. He was found outside of a resident building and brought to the hospital to \"become sober\"    Met SIRS criteria with tachypnea, Hypotension, tachycardia    ED course: 2.5 L NS, Zofran, Mg sulphate, home meds including metoprolol, lisinopril, nanexa  Labs: CBC WNL, Cr 1.11, , ALT 74, LA 1.9, ethanol 174, UA neg     Plan  F/U BCx  Monitor vital signs and I/Os  CIWA protocol  IVF  Aspiration precautions, Fall precautions    Alcohol intoxication (HCC)  Assessment & Plan  A/e/b  ethanol 174  Exam significant for considerable tremors, drowsy state, AO x 2  H/o chronic daily alcohol consumption, denies h/o withdrawal seizures  Last drink: 05/09/24, 7-8 glasses of vodka    CIWA score: 11, ativan given    Plan  CIWA protocol  Thiamine IV, folic acid and multivitamin  Continue to monitor signs of withdrawal      COPD (chronic obstructive pulmonary disease) ([facility])  Assessment & Plan  Patient has a long history of COPD  He smokes 1 pack a day  He doesn't remember the last time he needed an inhaler  - CXR: mild pulmonary venous congestion. No aspiration.   Continue Breo ellipta and albuterol PRN home med    Hypomagnesemia  Assessment & Plan  Patient's Mg on admission: 1.3  Continue to monitor  Replete as needed    Hypertension  Assessment & Plan  BP on admission 176/96mmHg    BP Readings from Last 3 Encounters:   05/11/24 121/73   05/06/24 147/90   04/23/24 138/70     Home medications include lisinopril 10 mg daily, Lopressor 50 mg twice daily, Ranexa 500mg q 12 hourly  Initially held all home medications due to low MAP  Restarting home meds now that BP elevated again  Continue to monitor blood pressure    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 disease (HCC)  Assessment & Plan  Lab Results   Component Value Date    HGBA1C 5.4 01/24/2024       Plan:  Hold metformin  ISS  Accuchecks ACHS  Continue to monitor blood glucose levels  CHO carb control diet    Sleep apnea  Assessment & Plan  Patient does not " "have a CPAP at home    CPAP ordered    GERD (gastroesophageal reflux disease)  Assessment & Plan  Chronic, stable  Continue home Protonix    Paroxysmal atrial fibrillation (HCC)  Assessment & Plan  Not in Afib on arriveal  - EKG: sinus tachycardia  Continue Eliquis    Fatty liver, alcoholic  Assessment & Plan  Hepatic steatosis on CT A/P 11/30  - Ast 137, ALT 74  Alcohol abstinence education    Prostate cancer (HCC)  Assessment & Plan  Patient has a history of prostate cancer, s/p radiation      Chronic heart failure with preserved ejection fraction (HCC)  Assessment & Plan  Wt Readings from Last 3 Encounters:   05/11/24 82.1 kg (181 lb)   04/23/24 82.6 kg (182 lb)   04/16/24 82.6 kg (182 lb 1.6 oz)     Last echo 09/23 : Normal systolic function, EF 60%, diastolic heart failure grade 2  Patient does not appear to be in exacerbation at this time    Continue to monitor Daily weights and I/Os    Dyslipidemia  Assessment & Plan  Chronic, stable  Last lipid panel 11 months ago: Cholesterol 190, TG 71,     Continue home dose of Pravastatin 40 mg    Hx of CABG  Assessment & Plan  Patient had a prior triple bypass surgery in 2004    Home meds include: Pravastatin, aspirin  Continue home meds    Depression, recurrent (HCC)  Assessment & Plan  No home medications, self medicates with alcohol    Hypotension-resolved as of 5/12/2024  Assessment & Plan  Patient was hypotensive in the ED : 82/46mmHg MAP 59  S/p 2.5 L NS  MAP stabilized to 92  Continue to hold BP medications : Ranexa, lisinopril and metoprolol  Continue to monitor BP       Condition at Discharge: stable AMA     Discharge Day Visit / Exam:      Vitals: Blood Pressure: 151/88 (05/13/24 1000)  Pulse: 86 (05/13/24 1000)  Temperature: 98.4 °F (36.9 °C) (05/13/24 0608)  Temp Source: Axillary (05/13/24 0608)  Respirations: 20 (05/13/24 0608)  Height: 6' 2\" (188 cm) (05/11/24 0015)  Weight - Scale: 85.7 kg (189 lb) (05/13/24 0545)  SpO2: 96 % (05/13/24 0838)  Exam: "   Physical Exam  Constitutional:       General: He is not in acute distress.  HENT:      Mouth/Throat:      Mouth: Mucous membranes are moist.      Pharynx: Oropharynx is clear.   Eyes:      Conjunctiva/sclera: Conjunctivae normal.      Pupils: Pupils are equal, round, and reactive to light.   Cardiovascular:      Rate and Rhythm: Regular rhythm. Tachycardia present.      Pulses: Normal pulses.      Heart sounds: Normal heart sounds.   Pulmonary:      Effort: Pulmonary effort is normal. No respiratory distress.      Breath sounds: No wheezing, rhonchi or rales.   Abdominal:      General: Bowel sounds are normal. There is no distension.      Palpations: Abdomen is soft.   Musculoskeletal:      Right lower leg: No edema.      Left lower leg: No edema.   Skin:     General: Skin is warm and dry.      Capillary Refill: Capillary refill takes less than 2 seconds.   Neurological:      General: No focal deficit present.      Mental Status: He is alert and oriented to person, place, and time.      Motor: No weakness.      Comments: Mild tremor       Discussion with Family: no  Patient Information Sharing:  Gregg Partida does not wish inpatient team to notify their loved ones of their condition and current plan. AMA     Discharge instructions/Information to patient and family:   See after visit summary for information provided to patient and family.       Discharge Medications: AMA     Medication List      CONTINUE taking these medications     Adult Blood Pressure Cuff Lg Kit; Use in the morning   ONE TOUCH ULTRA MINI w/Device Kit   OneTouch Delica Lancets Fine Misc     ASK your doctor about these medications     albuterol 90 mcg/act inhaler; Commonly known as: Ventolin HFA; Inhale 2   puffs every 4 (four) hours as needed for wheezing   aluminum-magnesium hydroxide-simethicone 7675-5132-361 mg/30 mL   suspension; Commonly known as: MAALOX; Take 30 mL by mouth every 4 (four)   hours as needed for indigestion or heartburn    apixaban 5 mg; Commonly known as: ELIQUIS; Take 1 tablet (5 mg total) by   mouth 2 (two) times a day Do not start before January 31, 2024.   aspirin 81 mg EC tablet; Commonly known as: ECOTRIN LOW STRENGTH   Breo Ellipta 200-25 MCG/ACT inhaler; Generic drug:   fluticasone-vilanterol; Inhale 1 puff daily Rinse mouth after use.   ferrous sulfate 325 (65 Fe) mg tablet; Take 1 tablet (325 mg total) by   mouth daily with breakfast   folic acid 1 mg tablet; Commonly known as: FOLVITE; TAKE 1 TABLET DAILY   gabapentin 300 mg capsule; Commonly known as: NEURONTIN; TAKE ONE   CAPSULE THREE TIMES DAILY   lisinopril 20 mg tablet; Commonly known as: ZESTRIL; Take 0.5 tablets   (10 mg total) by mouth daily   magnesium Oxide 400 mg Tabs; Commonly known as: MAG-OX; Take 1 tablet   (400 mg total) by mouth 2 (two) times a day   metFORMIN 500 mg tablet; Commonly known as: GLUCOPHAGE; TAKE 1 TABLET   DAILY WITH BREAKFAST   metoprolol tartrate 50 mg tablet; Commonly known as: LOPRESSOR; Take 1   tablet (50 mg total) by mouth every 12 (twelve) hours   naltrexone 50 mg tablet; Commonly known as: REVIA; Take 1 tablet (50 mg   total) by mouth daily   nicotine 21 mg/24 hr TD 24 hr patch; Commonly known as: NICODERM CQ;   Place 1 patch on the skin over 24 hours daily Do not start before December 4, 2023.   pantoprazole 40 mg tablet; Commonly known as: PROTONIX; TAKE 1 TABLET   TWO TIMES A DAY   pravastatin 40 mg tablet; Commonly known as: PRAVACHOL; TAKE 1 TABLET   DAILY WITH DINNER   ranolazine 500 mg 12 hr tablet; Commonly known as: RANEXA; TAKE 1 TABLET   EVERY 12 HOURS   senna-docusate sodium 8.6-50 mg per tablet; Commonly known as: SENOKOT   S; Take 1 tablet by mouth daily as needed for constipation   tamsulosin 0.4 mg; Commonly known as: FLOMAX; TAKE 1 CAPSULE DAILY   varenicline 1 mg tablet; Commonly known as: CHANTIX; TAKE 1 TABLET 2   TIMES A DAY   vitamin B-1 100 MG Tabs; TAKE 1 TABLET DAILY        Disposition: AMA  Other: AMA      For Discharges to [facility]:   ·       Not Applicable to this Patient - Not Applicable to this Patient     Discharge Statement:  I spent 30 minutes discharging the patient. This time was spent on the day of discharge. I had direct contact with the patient on the day of discharge. Greater than 50% of the total time was spent examining patient, answering all patient questions, arranging and discussing plan of care with patient as well as directly providing post-discharge instructions.  Additional time then spent on discharge activities.     ** Please Note: This note has been constructed using a voice recognition system **  MEREDITH Calvillo MD   05/13/24  3:01 PM

## 2024-05-13 NOTE — PROGRESS NOTES
"At about 0020, patient was very agitated, put his street clothes and verbalizing\" let me go outside across the street for 10 minutes and then I'll come back.\"  Explained that he's not allowed to go outside due to his recent medical condition, he's unsteady, he received Ativan due to CIWA of 20 at 2258. Patient unable to be redirected. Patient insisting on leaving the facility, observed almost falling when getting out of bed. Resident Dr. Moscoso made aware, seen and evaluated patient at bedside with resident Dr. Reyes.     Patient pacing inside the room, still wants to go outside to smoke. Pulling tubes, removing telemetry leads. Security was called. Hospitalist Iraida Melton at bedside. Placed on continual 1:1 for safety.   "

## 2024-05-13 NOTE — PROGRESS NOTES
At 0120, patient was taken to the bathroom, able to ambulate with RW when all of a sudden, observed having behavior outburst, became violent and aggressive to PCA. Patient got out of his room. Control Team was called. Patient remained violent and aggressive. Placed on locked restraints. Still patient attempting to get out of bed, banging the bed. Hospitalist Iraida Johnson, residents Dr. Moscoso and Dr Reyes at bedside. Ordered to give IM Zyprexa given at 0150 with minimal effect. IM Haldol 1mg given at 0157 X 1 dose.     Patient continuously monitored. Critical Care NP consulted. Seen and evaluated patient at bedside.

## 2024-05-13 NOTE — QUICK NOTE
Control team alerted at around 2:00 Am. Patient was found to be walking with a walker in the hallway trying to leave the hospital premises.  Per nursing aide, the patient was trying to leave the hospital, getting aggressive with her and pushed her away when controlled team was called.  Attempts were made to calm the patient down and reiterated the importance of staying in bed, however the patient refused and insisted on leaving the hospital.  Violent restraints were ordered.  Patient was given 5 mg Zyprexa. When he continued to shake the violent restraints, 1mg Haldol was given. ICU was requested to evaluate for ongoing aggressive behavior and for concern of patient's safety.  See recommendations in quick note. Patient was given IV tylenol for headache.

## 2024-05-13 NOTE — QUICK NOTE
Got notified by RN of high BP of 195/132mmHg and . Repeat Manual /120mmHg. Patient had a CIWA 16. Advised to give IV 5mg Lopressor and 4mg of ativan. Patient continues to refuse tele-monitoring at this time. Restraints continued.

## 2024-05-13 NOTE — QUICK NOTE
"RN, Margie Higuera informed us that patient is demanding to leave AMA. Upon evaluation on bedside, patient was found changing into his home clothes. Patient is alert, oriented to time, place and person. On asking the reason he stated \"I just want to go outside for 10 minutes to go across the street and smoke\". Patient was explained the importance of staying in the hospital and on telemetry due to his high HR. He continued to remove all leads and REFUSES to be placed back on telemetry. Patient received 4mg of Ativan at 2258, hence he was not able to sign AMA until 4 hours after ativan administration. Security was called. 1:1 observation ordered.    To be co-signed by Dr. Thompson.       "

## 2024-05-13 NOTE — ED CARE HANDOFF
Emergency Department Sign Out Note        Sign out and transfer of care from Dr Madden. See Separate Emergency Department note.     The patient, Gregg Partida, was evaluated by the previous provider for alcohol intoxication.    Workup Completed:  Labs, IVF    ED Course / Workup Pending (followup):  Pending re-eval once sober                                  ED Course as of 05/13/24 1600   Fri May 10, 2024   1917 SO: Pending sober   2249 Notified by nursing pt hypotensive. Pt persistently hypotensive after fluids. Per chart review had something similar that ultimately resolved on its on. BP cuff repositioned with mild improvement of BP but still hypotensive. Given persistent hypotension, even after fluids, will admit for monitoring.    2331 Discussed with [facility]. Accepts admission             Disposition  Final diagnoses:   Weakness   Alcohol intoxication (HCC)   Hypotension     Time reflects when diagnosis was documented in both MDM as applicable and the Disposition within this note       Time User Action Codes Description Comment    5/10/2024  7:03 PM Trish Madden Add [R53.1] Weakness     5/10/2024 11:30 PM Gilberto North Add [F10.929] Alcohol intoxication (HCC)     5/10/2024 11:30 PM Gilberto North Add [I95.9] Hypotension           ED Disposition       ED Disposition   Admit    Condition   Stable    Date/Time   Mon May 13, 2024  4:00 PM    Comment   Case was discussed with DORINDA and the patient's admission status was agreed to be Admission Status: observation status to the service of [facility].               Follow-up Information    None       Discharge Medication List as of 5/13/2024  2:57 PM        CONTINUE these medications which have NOT CHANGED    Details   albuterol (Ventolin HFA) 90 mcg/act inhaler Inhale 2 puffs every 4 (four) hours as needed for wheezing, Starting Tue 1/17/2023, Normal      aluminum-magnesium hydroxide-simethicone (MAALOX) 8026-5126-548 mg/30 mL suspension Take 30 mL by mouth every 4 (four)  hours as needed for indigestion or heartburn, Starting Wed 2/14/2024, Normal      apixaban (ELIQUIS) 5 mg Take 1 tablet (5 mg total) by mouth 2 (two) times a day Do not start before January 31, 2024., Starting Wed 1/31/2024, Normal      aspirin (ECOTRIN LOW STRENGTH) 81 mg EC tablet Take 81 mg by mouth daily, Historical Med      Blood Glucose Monitoring Suppl (ONE TOUCH ULTRA MINI) w/Device KIT Use as directed, Starting Thu 5/16/2019, Historical Med      Blood Pressure Monitoring (Adult Blood Pressure Cuff Lg) KIT Use in the morning, Starting Thu 12/14/2023, Normal      Breo Ellipta 200-25 MCG/ACT inhaler Inhale 1 puff daily Rinse mouth after use., Starting Fri 6/9/2023, Normal      ferrous sulfate 325 (65 Fe) mg tablet Take 1 tablet (325 mg total) by mouth daily with breakfast, Starting Fri 6/2/2023, Normal      folic acid (FOLVITE) 1 mg tablet TAKE 1 TABLET DAILY, Normal      gabapentin (NEURONTIN) 300 mg capsule TAKE ONE CAPSULE THREE TIMES DAILY, Normal      lisinopril (ZESTRIL) 20 mg tablet Take 0.5 tablets (10 mg total) by mouth daily, Starting Thu 10/5/2023, Normal      magnesium Oxide (MAG-OX) 400 mg TABS Take 1 tablet (400 mg total) by mouth 2 (two) times a day, Starting Wed 1/31/2024, Normal      metFORMIN (GLUCOPHAGE) 500 mg tablet TAKE 1 TABLET DAILY WITH BREAKFAST, Starting Sun 4/7/2024, Normal      metoprolol tartrate (LOPRESSOR) 50 mg tablet Take 1 tablet (50 mg total) by mouth every 12 (twelve) hours, Starting Wed 2/14/2024, Normal      naltrexone (REVIA) 50 mg tablet Take 1 tablet (50 mg total) by mouth daily, Starting Fri 11/3/2023, Normal      nicotine (NICODERM CQ) 21 mg/24 hr TD 24 hr patch Place 1 patch on the skin over 24 hours daily Do not start before December 4, 2023., Starting Mon 12/4/2023, Normal      ONETOUCH DELICA LANCETS FINE MISC 3 (three) times a day Test, Starting Thu 5/16/2019, Historical Med      pantoprazole (PROTONIX) 40 mg tablet TAKE 1 TABLET TWO TIMES A DAY, Normal       pravastatin (PRAVACHOL) 40 mg tablet TAKE 1 TABLET DAILY WITH DINNER, Normal      ranolazine (RANEXA) 500 mg 12 hr tablet TAKE 1 TABLET EVERY 12 HOURS, Normal      senna-docusate sodium (SENOKOT S) 8.6-50 mg per tablet Take 1 tablet by mouth daily as needed for constipation, Starting Sat 1/27/2024, No Print      tamsulosin (FLOMAX) 0.4 mg TAKE 1 CAPSULE DAILY, Normal      Thiamine HCl (vitamin B-1) 100 MG TABS TAKE 1 TABLET DAILY, Normal      varenicline (CHANTIX) 1 mg tablet TAKE 1 TABLET 2 TIMES A DAY, Starting Tue 12/12/2023, Normal           No discharge procedures on file.       ED Provider  Electronically Signed by     Gilberto North DO  05/13/24 1600

## 2024-05-13 NOTE — PROGRESS NOTES
Patient keeps on removing Telemetry leads, refusing to put it back. Residents Dr. Moscoso and Dr. Reyes at bedside, patient unable to be redirected. Telemetry discontinued.

## 2024-05-14 NOTE — UTILIZATION REVIEW
NOTIFICATION OF ADMISSION DISCHARGE   This is a Notification of Discharge from [facility]. Please be advised that this patient has been discharge from our facility. Below you will find the admission and discharge date and time including the patient’s disposition.   UTILIZATION REVIEW CONTACT:  Lashon James  Utilization   Network Utilization Review Department  Phone: [phone] x carefully listen to the prompts. All voicemails are confidential.  Email: [email or web address]     ADMISSION INFORMATION  PRESENTATION DATE: 5/10/2024  2:19 PM  OBERVATION ADMISSION DATE:   INPATIENT ADMISSION DATE: 5/11/24  1:54 PM   DISCHARGE DATE: 5/13/2024  2:55 PM   DISPOSITION:Left against medical advice or discontinued care    Network Utilization Review Department  ATTENTION: Please call with any questions or concerns to [phone] and carefully listen to the prompts so that you are directed to the right person. All voicemails are confidential.   For Discharge needs, contact Care Management DC Support Team at [phone] opt. 2  Send all requests for admission clinical reviews, approved or denied determinations and any other requests to dedicated fax number below belonging to the campus where the patient is receiving treatment. List of dedicated fax numbers for the Facilities:  FACILITY NAME UR FAX NUMBER   ADMISSION DENIALS (Administrative/Medical Necessity) [phone]   DISCHARGE SUPPORT TEAM ([facility]) [phone]   PARENT CHILD HEALTH (Maternity/NICU/Pediatrics) [phone]   [facility] [phone]   [facility] [phone]   [facility] [phone]   [facility] [phone]   [facility] [phone]   [facility] [phone]   [facility] [phone]   [facility]  [facility] [phone]   [facility] [phone]   [facility] [phone]   [facility] [phone]   [facility] [phone]

## 2024-05-15 ENCOUNTER — TRANSITIONAL CARE MANAGEMENT (OUTPATIENT)
Age: 64
End: 2024-05-15

## 2024-05-16 LAB
BACTERIA BLD CULT: NORMAL
BACTERIA BLD CULT: NORMAL

## 2024-05-17 ENCOUNTER — HOSPITAL ENCOUNTER (EMERGENCY)
Facility: HOSPITAL | Age: 64
Discharge: HOME | End: 2024-05-18
Attending: EMERGENCY MEDICINE
Payer: COMMERCIAL

## 2024-05-17 DIAGNOSIS — F10.929 ALCOHOL INTOXICATION (HCC): ICD-10-CM

## 2024-05-17 DIAGNOSIS — F10.10 ALCOHOL ABUSE: Primary | ICD-10-CM

## 2024-05-17 LAB
ALBUMIN SERPL BCP-MCNC: 3.6 G/DL (ref 3.5–5)
ALP SERPL-CCNC: 62 U/L (ref 34–104)
ALT SERPL W P-5'-P-CCNC: 50 U/L (ref 7–52)
ANION GAP SERPL CALCULATED.3IONS-SCNC: 8 MMOL/L (ref 4–13)
AST SERPL W P-5'-P-CCNC: 72 U/L (ref 13–39)
BASOPHILS # BLD AUTO: 0.07 THOUSANDS/ÂΜL (ref 0–0.1)
BASOPHILS NFR BLD AUTO: 2 % (ref 0–1)
BILIRUB SERPL-MCNC: 0.38 MG/DL (ref 0.2–1)
BUN SERPL-MCNC: 18 MG/DL (ref 5–25)
CALCIUM SERPL-MCNC: 8 MG/DL (ref 8.4–10.2)
CHLORIDE SERPL-SCNC: 110 MMOL/L (ref 96–108)
CO2 SERPL-SCNC: 25 MMOL/L (ref 21–32)
CREAT SERPL-MCNC: 0.94 MG/DL (ref 0.6–1.3)
EOSINOPHIL # BLD AUTO: 0.21 THOUSAND/ÂΜL (ref 0–0.61)
EOSINOPHIL NFR BLD AUTO: 4 % (ref 0–6)
ERYTHROCYTE [DISTWIDTH] IN BLOOD BY AUTOMATED COUNT: 14.9 % (ref 11.6–15.1)
ETHANOL SERPL-MCNC: 452 MG/DL
GFR SERPL CREATININE-BSD FRML MDRD: 86 ML/MIN/1.73SQ M
GLUCOSE SERPL-MCNC: 106 MG/DL (ref 65–140)
HCT VFR BLD AUTO: 36.7 % (ref 36.5–49.3)
HGB BLD-MCNC: 12 G/DL (ref 12–17)
IMM GRANULOCYTES # BLD AUTO: 0.02 THOUSAND/UL (ref 0–0.2)
IMM GRANULOCYTES NFR BLD AUTO: 0 % (ref 0–2)
LYMPHOCYTES # BLD AUTO: 1.68 THOUSANDS/ÂΜL (ref 0.6–4.47)
LYMPHOCYTES NFR BLD AUTO: 35 % (ref 14–44)
MAGNESIUM SERPL-MCNC: 1.6 MG/DL (ref 1.9–2.7)
MCH RBC QN AUTO: 33.2 PG (ref 26.8–34.3)
MCHC RBC AUTO-ENTMCNC: 32.7 G/DL (ref 31.4–37.4)
MCV RBC AUTO: 102 FL (ref 82–98)
MONOCYTES # BLD AUTO: 1.06 THOUSAND/ÂΜL (ref 0.17–1.22)
MONOCYTES NFR BLD AUTO: 22 % (ref 4–12)
NEUTROPHILS # BLD AUTO: 1.77 THOUSANDS/ÂΜL (ref 1.85–7.62)
NEUTS SEG NFR BLD AUTO: 37 % (ref 43–75)
NRBC BLD AUTO-RTO: 0 /100 WBCS
PLATELET # BLD AUTO: 143 THOUSANDS/UL (ref 149–390)
PMV BLD AUTO: 10.6 FL (ref 8.9–12.7)
POTASSIUM SERPL-SCNC: 4.4 MMOL/L (ref 3.5–5.3)
PROT SERPL-MCNC: 6.9 G/DL (ref 6.4–8.4)
RBC # BLD AUTO: 3.61 MILLION/UL (ref 3.88–5.62)
SODIUM SERPL-SCNC: 143 MMOL/L (ref 135–147)
WBC # BLD AUTO: 4.81 THOUSAND/UL (ref 4.31–10.16)

## 2024-05-17 PROCEDURE — 99284 EMERGENCY DEPT VISIT MOD MDM: CPT

## 2024-05-17 PROCEDURE — 36415 COLL VENOUS BLD VENIPUNCTURE: CPT | Performed by: EMERGENCY MEDICINE

## 2024-05-17 PROCEDURE — 99285 EMERGENCY DEPT VISIT HI MDM: CPT | Performed by: EMERGENCY MEDICINE

## 2024-05-17 PROCEDURE — 80053 COMPREHEN METABOLIC PANEL: CPT | Performed by: EMERGENCY MEDICINE

## 2024-05-17 PROCEDURE — 83735 ASSAY OF MAGNESIUM: CPT | Performed by: EMERGENCY MEDICINE

## 2024-05-17 PROCEDURE — 82077 ASSAY SPEC XCP UR&BREATH IA: CPT | Performed by: EMERGENCY MEDICINE

## 2024-05-17 PROCEDURE — 96372 THER/PROPH/DIAG INJ SC/IM: CPT

## 2024-05-17 PROCEDURE — 85025 COMPLETE CBC W/AUTO DIFF WBC: CPT | Performed by: EMERGENCY MEDICINE

## 2024-05-17 PROCEDURE — 93005 ELECTROCARDIOGRAM TRACING: CPT

## 2024-05-17 RX ORDER — OLANZAPINE 10 MG/2ML
10 INJECTION, POWDER, FOR SOLUTION INTRAMUSCULAR ONCE
Status: COMPLETED | OUTPATIENT
Start: 2024-05-17 | End: 2024-05-17

## 2024-05-17 RX ORDER — LORAZEPAM 2 MG/ML
2 INJECTION INTRAMUSCULAR ONCE
Status: COMPLETED | OUTPATIENT
Start: 2024-05-17 | End: 2024-05-17

## 2024-05-17 RX ADMIN — OLANZAPINE 10 MG: 10 INJECTION, POWDER, FOR SOLUTION INTRAMUSCULAR at 22:28

## 2024-05-17 RX ADMIN — LORAZEPAM 2 MG: 2 INJECTION INTRAMUSCULAR; INTRAVENOUS at 22:27

## 2024-05-18 VITALS
TEMPERATURE: 98 F | OXYGEN SATURATION: 96 % | SYSTOLIC BLOOD PRESSURE: 150 MMHG | DIASTOLIC BLOOD PRESSURE: 88 MMHG | RESPIRATION RATE: 16 BRPM | HEART RATE: 88 BPM

## 2024-05-18 NOTE — ED NOTES
Patient sleeping at this time; lights dimmed for comfort. 1:1 monitoring continues with GERALDO Garber at bedside. Patient medically clear at 1111 today.      Maira Gonzalez RN  05/18/24 3093

## 2024-05-18 NOTE — ED PROVIDER NOTES
History  Chief Complaint   Patient presents with    Alcohol Problem     Pt arrived to ED stating that he has an alcohol problem that he wants help with, he wants to see his son, and that he drank a quart of vodka today. Pt arrives with his walker at the bedside via EMS     63 yo male just discharged from hospital 4 days presents via ambulance requesting detox.  Pt. Says he wants help.  Says he has been drinking a lot.  He denies fall or injury.  No pain anywhere.  No vomiting, fever, cough.  Wants something to eat.      History provided by:  Patient   used: No    Alcohol Problem  Associated symptoms: no abdominal pain, no chest pain, no congestion, no cough, no diarrhea, no fever, no headaches, no myalgias, no nausea, no rash, no shortness of breath, no sore throat and no vomiting        Prior to Admission Medications   Prescriptions Last Dose Informant Patient Reported? Taking?   Blood Glucose Monitoring Suppl (ONE TOUCH ULTRA MINI) w/Device KIT  Self Yes No   Sig: Use as directed   Patient not taking: Reported on 3/25/2024   Blood Pressure Monitoring (Adult Blood Pressure Cuff Lg) KIT   No No   Sig: Use in the morning   Patient not taking: Reported on 3/25/2024   Breo Ellipta 200-25 MCG/ACT inhaler  Self No No   Sig: Inhale 1 puff daily Rinse mouth after use.   Patient not taking: Reported on 3/25/2024   ONETOUCH DELICA LANCETS FINE MISC  Self Yes No   Sig: 3 (three) times a day Test   Thiamine HCl (vitamin B-1) 100 MG TABS  Self No No   Sig: TAKE 1 TABLET DAILY   Patient not taking: Reported on 3/25/2024   albuterol (Ventolin HFA) 90 mcg/act inhaler  Self No No   Sig: Inhale 2 puffs every 4 (four) hours as needed for wheezing   Patient not taking: Reported on 3/25/2024   aluminum-magnesium hydroxide-simethicone (MAALOX) 1351-6708-713 mg/30 mL suspension   No No   Sig: Take 30 mL by mouth every 4 (four) hours as needed for indigestion or heartburn   Patient not taking: Reported on 3/25/2024    apixaban (ELIQUIS) 5 mg   No No   Sig: Take 1 tablet (5 mg total) by mouth 2 (two) times a day Do not start before January 31, 2024.   aspirin (ECOTRIN LOW STRENGTH) 81 mg EC tablet  Self Yes No   Sig: Take 81 mg by mouth daily   ferrous sulfate 325 (65 Fe) mg tablet  Self No No   Sig: Take 1 tablet (325 mg total) by mouth daily with breakfast   folic acid (FOLVITE) 1 mg tablet   No No   Sig: TAKE 1 TABLET DAILY   gabapentin (NEURONTIN) 300 mg capsule   No No   Sig: TAKE ONE CAPSULE THREE TIMES DAILY   lisinopril (ZESTRIL) 20 mg tablet  Self No No   Sig: Take 0.5 tablets (10 mg total) by mouth daily   magnesium Oxide (MAG-OX) 400 mg TABS   No No   Sig: Take 1 tablet (400 mg total) by mouth 2 (two) times a day   metFORMIN (GLUCOPHAGE) 500 mg tablet   No No   Sig: TAKE 1 TABLET DAILY WITH BREAKFAST   metoprolol tartrate (LOPRESSOR) 50 mg tablet   No No   Sig: Take 1 tablet (50 mg total) by mouth every 12 (twelve) hours   naltrexone (REVIA) 50 mg tablet  Self No No   Sig: Take 1 tablet (50 mg total) by mouth daily   Patient not taking: Reported on 3/25/2024   nicotine (NICODERM CQ) 21 mg/24 hr TD 24 hr patch  Self No No   Sig: Place 1 patch on the skin over 24 hours daily Do not start before December 4, 2023.   Patient not taking: Reported on 3/25/2024   pantoprazole (PROTONIX) 40 mg tablet   No No   Sig: TAKE 1 TABLET TWO TIMES A DAY   pravastatin (PRAVACHOL) 40 mg tablet  Self No No   Sig: TAKE 1 TABLET DAILY WITH DINNER   ranolazine (RANEXA) 500 mg 12 hr tablet   No No   Sig: TAKE 1 TABLET EVERY 12 HOURS   senna-docusate sodium (SENOKOT S) 8.6-50 mg per tablet   No No   Sig: Take 1 tablet by mouth daily as needed for constipation   Patient not taking: Reported on 3/25/2024   tamsulosin (FLOMAX) 0.4 mg   No No   Sig: TAKE 1 CAPSULE DAILY   varenicline (CHANTIX) 1 mg tablet   No No   Sig: TAKE 1 TABLET 2 TIMES A DAY   Patient not taking: Reported on 3/25/2024      Facility-Administered Medications: None       Past  Medical History:   Diagnosis Date    Acute on chronic kidney failure  (HCC)     Alcohol withdrawal (HCC) 06/07/2019    Atrial fibrillation (HCC)     Cancer (HCC)     prostate ca,had radiation    Cardiac disease     stents,then triple bypass    COPD (chronic obstructive pulmonary disease) (HCC)     Coronary artery disease     ETOH abuse     Heart failure (HCC)     History of heart surgery     says triple bypass [facility]    Hx of heart artery stent     2014    Hyperlipidemia     Hypertension     Hypovolemic shock ([facility]) 12/22/2019    Lumbar spondylitis ([facility]) 10/13/2022    Nasal bone fracture 10/10/2022    Prostate CA ([facility])     S/P CABG x 3     2004    Sleep apnea        Past Surgical History:   Procedure Laterality Date    CARDIAC CATHETERIZATION      2 stents    CORONARY ARTERY BYPASS GRAFT  2004    [place] ARTHRD ANT INTERBODY MIN DSC CRV BELOW C2 N/A 12/16/2020    Procedure: Anterior cervical discectomy with fusion C4-C7; Posterior cervical decompression and fusion C2-T2;  Surgeon: David Rowell MD;  Location: [facility];  Service: Neurosurgery    TONSILLECTOMY         Family History   Problem Relation Age of Onset    Diabetes Mother     Uterine cancer Mother     COPD Father     Hypertension Father      I have reviewed and agree with the history as documented.    E-Cigarette/Vaping    E-Cigarette Use Never User      E-Cigarette/Vaping Substances    Nicotine Yes     THC No     CBD No     Flavoring No     Other No     Unknown No      Social History     Tobacco Use    Smoking status: Every Day     Current packs/day: 1.50     Average packs/day: 1.5 packs/day for 40.0 years (60.0 ttl pk-yrs)     Types: Cigarettes    Smokeless tobacco: Never   Vaping Use    Vaping status: Never Used   Substance Use Topics    Alcohol use: Yes     Alcohol/week: 4.0 standard drinks of alcohol     Types: 4 Standard drinks or equivalent per week     Comment: quart of vodka daily    Drug use: No       Review of Systems   Constitutional:  Negative.  Negative for chills and fever.   HENT: Negative.  Negative for congestion and sore throat.    Eyes: Negative.    Respiratory: Negative.  Negative for cough and shortness of breath.    Cardiovascular: Negative.  Negative for chest pain and leg swelling.   Gastrointestinal: Negative.  Negative for abdominal pain, diarrhea, nausea and vomiting.   Genitourinary: Negative.  Negative for dysuria, flank pain and hematuria.   Musculoskeletal: Negative.  Negative for back pain and myalgias.   Skin: Negative.  Negative for rash and wound.   Neurological: Negative.  Negative for dizziness and headaches.   Psychiatric/Behavioral: Negative.  Negative for confusion and hallucinations. The patient is not nervous/anxious.    All other systems reviewed and are negative.      Physical Exam  Physical Exam  Vitals and nursing note reviewed.   Constitutional:       General: He is not in acute distress.     Appearance: He is well-developed. He is not ill-appearing or diaphoretic.      Comments: + AOB   HENT:      Head: Normocephalic and atraumatic.   Eyes:      General: No scleral icterus.     Conjunctiva/sclera: Conjunctivae normal.      Pupils: Pupils are equal, round, and reactive to light.   Cardiovascular:      Rate and Rhythm: Normal rate and regular rhythm.      Heart sounds: Normal heart sounds. No murmur heard.  Pulmonary:      Effort: Pulmonary effort is normal. No respiratory distress.      Breath sounds: Normal breath sounds.   Abdominal:      General: Bowel sounds are normal. There is no distension.      Palpations: Abdomen is soft.      Tenderness: There is no abdominal tenderness.   Musculoskeletal:         General: No tenderness or deformity. Normal range of motion.      Cervical back: Normal range of motion and neck supple.   Skin:     General: Skin is warm and dry.      Coloration: Skin is not pale.      Findings: Bruising and rash present. No erythema.      Comments: Dry, scaly skin face   Neurological:       General: No focal deficit present.      Mental Status: He is alert and oriented to person, place, and time.      Cranial Nerves: No cranial nerve deficit.      Motor: No weakness.      Comments: Follows commands, motor/speech intact   Psychiatric:         Mood and Affect: Mood normal.         Behavior: Behavior normal.         Vital Signs  ED Triage Vitals [05/17/24 2036]   Temperature Pulse Respirations Blood Pressure SpO2   98.1 °F (36.7 °C) 100 16 137/83 95 %      Temp Source Heart Rate Source Patient Position - Orthostatic VS BP Location FiO2 (%)   Tympanic Monitor Sitting Left arm --      Pain Score       No Pain           Vitals:    05/17/24 2036   BP: 137/83   Pulse: 100   Patient Position - Orthostatic VS: Sitting         Visual Acuity      ED Medications  Medications   OLANZapine (ZyPREXA) IM injection 10 mg (10 mg Intramuscular Given 5/17/24 2228)   LORazepam (ATIVAN) injection 2 mg (2 mg Intramuscular Given 5/17/24 2227)       Diagnostic Studies  Results Reviewed       Procedure Component Value Units Date/Time    Comprehensive metabolic panel [[id number]]  (Abnormal) Collected: 05/17/24 2107    Lab Status: Final result Specimen: Blood from Arm, Right Updated: 05/17/24 2149     Sodium 143 mmol/L      Potassium 4.4 mmol/L      Chloride 110 mmol/L      CO2 25 mmol/L      ANION GAP 8 mmol/L      BUN 18 mg/dL      Creatinine 0.94 mg/dL      Glucose 106 mg/dL      Calcium 8.0 mg/dL      AST 72 U/L      ALT 50 U/L      Alkaline Phosphatase 62 U/L      Total Protein 6.9 g/dL      Albumin 3.6 g/dL      Total Bilirubin 0.38 mg/dL      eGFR 86 ml/min/1.73sq m     Narrative:      National Kid[employer] Foundation guidelines for Chronic Kidney Disease (CKD):     Stage 1 with normal or high GFR (GFR > 90 mL/min/1.73 square meters)    Stage 2 Mild CKD (GFR = 60-89 mL/min/1.73 square meters)    Stage 3A Moderate CKD (GFR = 45-59 mL/min/1.73 square meters)    Stage 3B Moderate CKD (GFR = 30-44 mL/min/1.73 square meters)     Stage 4 Severe CKD (GFR = 15-29 mL/min/1.73 square meters)    Stage 5 End Stage CKD (GFR <15 mL/min/1.73 square meters)  Note: GFR calculation is accurate only with a steady state creatinine    Magnesium [[id number]]  (Abnormal) Collected: 05/17/24 2107    Lab Status: Final result Specimen: Blood from Arm, Right Updated: 05/17/24 2149     Magnesium 1.6 mg/dL     Ethanol [[id number]]  (Abnormal) Collected: 05/17/24 2107    Lab Status: Final result Specimen: Blood from Arm, Right Updated: 05/17/24 2128     Ethanol Lvl 452 mg/dL     CBC and differential [[id number]]  (Abnormal) Collected: 05/17/24 2107    Lab Status: Final result Specimen: Blood from Arm, Right Updated: 05/17/24 2114     WBC 4.81 Thousand/uL      RBC 3.61 Million/uL      Hemoglobin 12.0 g/dL      Hematocrit 36.7 %       fL      MCH 33.2 pg      MCHC 32.7 g/dL      RDW 14.9 %      MPV 10.6 fL      Platelets 143 Thousands/uL      nRBC 0 /100 WBCs      Segmented % 37 %      Immature Grans % 0 %      Lymphocytes % 35 %      Monocytes % 22 %      Eosinophils Relative 4 %      Basophils Relative 2 %      Absolute Neutrophils 1.77 Thousands/µL      Absolute Immature Grans 0.02 Thousand/uL      Absolute Lymphocytes 1.68 Thousands/µL      Absolute Monocytes 1.06 Thousand/µL      Eosinophils Absolute 0.21 Thousand/µL      Basophils Absolute 0.07 Thousands/µL     UA (URINE) with reflex to Scope [[id number]]     Lab Status: No result Specimen: Urine                    No orders to display              Procedures  ECG 12 Lead Documentation Only    Date/Time: 5/17/2024 9:25 PM    Performed by: Marilia Martinez MD  Authorized by: Marilia Martinez MD    Indications / Diagnosis:  Detox  ECG reviewed by me, the ED Provider: yes    Patient location:  ED  Previous ECG:     Previous ECG:  Compared to current    Comparison ECG info:  5/14/2024    Similarity:  No change  Interpretation:     Interpretation: abnormal    Rate:     ECG rate:  94    ECG rate assessment: normal     Rhythm:     Rhythm: sinus rhythm    Ectopy:     Ectopy: none    QRS:     QRS axis:  Normal  Conduction:     Conduction: normal    ST segments:     ST segments:  Normal  T waves:     T waves: flattening      Flattening:  III and aVF           ED Course                                             Medical Decision Making  2200- pt. Trying to walk out of ER, says he changed his mind about detox and wants to leave.  [facility]  Pt. Tried to call his son but didn't get through.  Started calling the police, yelling and cursing and being verbally abusive to staff.  I tried to call his son but got voicemail - left message to call me.  Pt. May need to be restrained/sedated for safety.  2300- pt. Medicated, resting, will be observed until sober or someone picks him up and takes responsibility for him.    Amount and/or Complexity of Data Reviewed  Labs: ordered.    Risk  Prescription drug management.             Disposition  Final diagnoses:   Alcohol abuse   Alcohol intoxication (HCC)     Time reflects when diagnosis was documented in both MDM as applicable and the Disposition within this note       Time User Action Codes Description Comment    5/17/2024 11:03 PM Marilia Martinez Add [F10.10] Alcohol abuse     5/17/2024 11:03 PM Marilia Martinez Add [F10.929] Alcohol intoxication (HCC)           ED Disposition       None          Follow-up Information    None         Patient's Medications   Discharge Prescriptions    No medications on file       No discharge procedures on file.    PDMP Review         Value Time User    PDMP Reviewed  Yes 1/24/2024  9:23 AM Tyrone Freire DO            ED Provider  Electronically Signed by             Marilia Martinez MD  05/17/24 2440

## 2024-05-18 NOTE — ED NOTES
"Pt attempted to elope from department through the ambulance bay at this time, when confronted by ED staff PT stated \"I''m going the fuck home, get out of my way.\" Pt verbally aggressive and threatening Crisis workers, security and RN at this time. PT reminded this is inappropriate and that he is intoxicated and unable to leave without someone to take him home. Pt attempted to call son who did not answer at this time, offered to get back into stretcher and use phone to make more calls, pt continued to threaten staff and states \"Fuck you. Fuck you. Fuck you.\" While pointing to Crisis worker, Security and this RN. Patient assisted back to bed at this time due to increasing agitation and repeated attempts to leave the department. MD aware and at bedside, pt continues to curse and threaten staff, medications ordered and administered per MD orders, pt placed in BH room and 1:1 at bedside to monitor patient. Pt continues to berate security, 1:1 and ED staff, calling security homophobic slurs and threatening violence, pt calmed after provided warm blanket and medicated. 1:1 at bedside.      Sergio Ortez, CHRISTIE  05/18/24 0018    "

## 2024-05-18 NOTE — ED NOTES
Pt drank 8 oz of juice. Ambulated without difficulty. Refuses rehab. Discharged,      Crys Aparicio RN  05/18/24 7417

## 2024-05-18 NOTE — ED CARE HANDOFF
Emergency Department Sign Out Note        Sign out and transfer of care from previous provider. See Separate Emergency Department note.     The patient, Gregg Partida, was evaluated by the previous provider for alcohol intoxication.    Workup Completed:  Medical clearance workup    ED Course / Workup Pending (followup):  Clinical sobriety                                  ED Course as of 05/18/24 0713   Sat May 18, 2024   0134 Patient signed out pending clinical sobriety.  Patient will be clinically sober at 11 AM.  At that time patient can be discharged home upon his request.     Procedures  Medical Decision Making  Patient initially presented with alcohol intoxication requesting detox.  Patient then changed his mind and wanted to leave.  Patient's blood alcohol level was 452.  Patient attempted to leave the emergency department multiple times.  Patient could not arrange to get a ride.  Patient required sedation to calm him down.  Patient will be clinically sober at 11 AM.  Patient can leave the emergency department after he is clinically sober.  Care of patient signed out to the next attending who will continue to monitor patient until patient is clinically sober and then patient will be discharged.    Amount and/or Complexity of Data Reviewed  Labs: ordered. Decision-making details documented in ED Course.    Risk  Prescription drug management.            Disposition  Final diagnoses:   Alcohol abuse   Alcohol intoxication (HCC)     Time reflects when diagnosis was documented in both MDM as applicable and the Disposition within this note       Time User Action Codes Description Comment    5/17/2024 11:03 PM Marilia Martinez Add [F10.10] Alcohol abuse     5/17/2024 11:03 PM Marilia Martinez Add [F10.929] Alcohol intoxication (HCC)           ED Disposition       None          Follow-up Information    None       Patient's Medications   Discharge Prescriptions    No medications on file     No discharge procedures on  file.       ED Provider  Electronically Signed by     Emma Erwin,   05/18/24 0712       Emma Erwin,   05/18/24 0754

## 2024-05-19 LAB
ATRIAL RATE: 94 BPM
P AXIS: 71 DEGREES
PR INTERVAL: 134 MS
QRS AXIS: 80 DEGREES
QRSD INTERVAL: 92 MS
QT INTERVAL: 366 MS
QTC INTERVAL: 457 MS
T WAVE AXIS: 39 DEGREES
VENTRICULAR RATE: 94 BPM

## 2024-05-19 PROCEDURE — 93010 ELECTROCARDIOGRAM REPORT: CPT | Performed by: INTERNAL MEDICINE

## 2024-05-21 ENCOUNTER — HOSPITAL ENCOUNTER (EMERGENCY)
Facility: HOSPITAL | Age: 64
Discharge: HOME | End: 2024-05-22
Attending: EMERGENCY MEDICINE
Payer: COMMERCIAL

## 2024-05-21 DIAGNOSIS — F10.10 ALCOHOL ABUSE: ICD-10-CM

## 2024-05-21 DIAGNOSIS — F10.939 ALCOHOL WITHDRAWAL (HCC): Primary | ICD-10-CM

## 2024-05-21 DIAGNOSIS — F10.929 ALCOHOL INTOXICATION (HCC): ICD-10-CM

## 2024-05-21 LAB
ALBUMIN SERPL BCP-MCNC: 3.9 G/DL (ref 3.5–5)
ALP SERPL-CCNC: 80 U/L (ref 34–104)
ALT SERPL W P-5'-P-CCNC: 40 U/L (ref 7–52)
ANION GAP SERPL CALCULATED.3IONS-SCNC: 12 MMOL/L (ref 4–13)
APTT PPP: 28 SECONDS (ref 23–37)
AST SERPL W P-5'-P-CCNC: 91 U/L (ref 13–39)
ATRIAL RATE: 117 BPM
BASOPHILS # BLD AUTO: 0.12 THOUSANDS/ÂΜL (ref 0–0.1)
BASOPHILS NFR BLD AUTO: 2 % (ref 0–1)
BILIRUB SERPL-MCNC: 0.63 MG/DL (ref 0.2–1)
BUN SERPL-MCNC: 18 MG/DL (ref 5–25)
CALCIUM SERPL-MCNC: 8.4 MG/DL (ref 8.4–10.2)
CHLORIDE SERPL-SCNC: 106 MMOL/L (ref 96–108)
CO2 SERPL-SCNC: 24 MMOL/L (ref 21–32)
CREAT SERPL-MCNC: 1 MG/DL (ref 0.6–1.3)
EOSINOPHIL # BLD AUTO: 0.13 THOUSAND/ÂΜL (ref 0–0.61)
EOSINOPHIL NFR BLD AUTO: 2 % (ref 0–6)
ERYTHROCYTE [DISTWIDTH] IN BLOOD BY AUTOMATED COUNT: 14.5 % (ref 11.6–15.1)
ETHANOL SERPL-MCNC: 170 MG/DL
GFR SERPL CREATININE-BSD FRML MDRD: 80 ML/MIN/1.73SQ M
GLUCOSE SERPL-MCNC: 78 MG/DL (ref 65–140)
HCT VFR BLD AUTO: 37.8 % (ref 36.5–49.3)
HGB BLD-MCNC: 12.4 G/DL (ref 12–17)
IMM GRANULOCYTES # BLD AUTO: 0.02 THOUSAND/UL (ref 0–0.2)
IMM GRANULOCYTES NFR BLD AUTO: 0 % (ref 0–2)
INR PPP: 0.93 (ref 0.84–1.19)
LIPASE SERPL-CCNC: 53 U/L (ref 11–82)
LYMPHOCYTES # BLD AUTO: 1.12 THOUSANDS/ÂΜL (ref 0.6–4.47)
LYMPHOCYTES NFR BLD AUTO: 21 % (ref 14–44)
MCH RBC QN AUTO: 32.9 PG (ref 26.8–34.3)
MCHC RBC AUTO-ENTMCNC: 32.8 G/DL (ref 31.4–37.4)
MCV RBC AUTO: 100 FL (ref 82–98)
MONOCYTES # BLD AUTO: 0.82 THOUSAND/ÂΜL (ref 0.17–1.22)
MONOCYTES NFR BLD AUTO: 15 % (ref 4–12)
NEUTROPHILS # BLD AUTO: 3.11 THOUSANDS/ÂΜL (ref 1.85–7.62)
NEUTS SEG NFR BLD AUTO: 60 % (ref 43–75)
NRBC BLD AUTO-RTO: 0 /100 WBCS
P AXIS: 66 DEGREES
PLATELET # BLD AUTO: 146 THOUSANDS/UL (ref 149–390)
PMV BLD AUTO: 9.6 FL (ref 8.9–12.7)
POTASSIUM SERPL-SCNC: 5.2 MMOL/L (ref 3.5–5.3)
PR INTERVAL: 136 MS
PROT SERPL-MCNC: 7.3 G/DL (ref 6.4–8.4)
PROTHROMBIN TIME: 12.6 SECONDS (ref 11.6–14.5)
QRS AXIS: 99 DEGREES
QRSD INTERVAL: 86 MS
QT INTERVAL: 302 MS
QTC INTERVAL: 421 MS
RBC # BLD AUTO: 3.77 MILLION/UL (ref 3.88–5.62)
SODIUM SERPL-SCNC: 142 MMOL/L (ref 135–147)
T WAVE AXIS: 16 DEGREES
VENTRICULAR RATE: 117 BPM
WBC # BLD AUTO: 5.32 THOUSAND/UL (ref 4.31–10.16)

## 2024-05-21 PROCEDURE — 80053 COMPREHEN METABOLIC PANEL: CPT | Performed by: EMERGENCY MEDICINE

## 2024-05-21 PROCEDURE — 96375 TX/PRO/DX INJ NEW DRUG ADDON: CPT

## 2024-05-21 PROCEDURE — 96374 THER/PROPH/DIAG INJ IV PUSH: CPT

## 2024-05-21 PROCEDURE — 99285 EMERGENCY DEPT VISIT HI MDM: CPT | Performed by: EMERGENCY MEDICINE

## 2024-05-21 PROCEDURE — 85025 COMPLETE CBC W/AUTO DIFF WBC: CPT | Performed by: EMERGENCY MEDICINE

## 2024-05-21 PROCEDURE — 83690 ASSAY OF LIPASE: CPT | Performed by: EMERGENCY MEDICINE

## 2024-05-21 PROCEDURE — 99285 EMERGENCY DEPT VISIT HI MDM: CPT

## 2024-05-21 PROCEDURE — 93005 ELECTROCARDIOGRAM TRACING: CPT

## 2024-05-21 PROCEDURE — 96361 HYDRATE IV INFUSION ADD-ON: CPT

## 2024-05-21 PROCEDURE — 85610 PROTHROMBIN TIME: CPT | Performed by: EMERGENCY MEDICINE

## 2024-05-21 PROCEDURE — 82077 ASSAY SPEC XCP UR&BREATH IA: CPT | Performed by: EMERGENCY MEDICINE

## 2024-05-21 PROCEDURE — 96376 TX/PRO/DX INJ SAME DRUG ADON: CPT

## 2024-05-21 PROCEDURE — 85730 THROMBOPLASTIN TIME PARTIAL: CPT | Performed by: EMERGENCY MEDICINE

## 2024-05-21 PROCEDURE — 36415 COLL VENOUS BLD VENIPUNCTURE: CPT | Performed by: EMERGENCY MEDICINE

## 2024-05-21 RX ORDER — ONDANSETRON 2 MG/ML
4 INJECTION INTRAMUSCULAR; INTRAVENOUS ONCE
Status: COMPLETED | OUTPATIENT
Start: 2024-05-21 | End: 2024-05-21

## 2024-05-21 RX ORDER — LORAZEPAM 2 MG/ML
1 INJECTION INTRAMUSCULAR ONCE
Status: COMPLETED | OUTPATIENT
Start: 2024-05-21 | End: 2024-05-21

## 2024-05-21 RX ADMIN — ONDANSETRON 4 MG: 2 INJECTION INTRAMUSCULAR; INTRAVENOUS at 19:34

## 2024-05-21 RX ADMIN — LORAZEPAM 1 MG: 2 INJECTION INTRAMUSCULAR; INTRAVENOUS at 19:35

## 2024-05-21 RX ADMIN — SODIUM CHLORIDE 1000 ML: 0.9 INJECTION, SOLUTION INTRAVENOUS at 19:32

## 2024-05-21 RX ADMIN — LORAZEPAM 1 MG: 2 INJECTION INTRAMUSCULAR; INTRAVENOUS at 22:56

## 2024-05-21 NOTE — ED PROVIDER NOTES
History  Chief Complaint   Patient presents with    Withdrawal - Alcohol     Pt arrived EMS. Pt reports last drink was this morning. Pt reports nausea, vomiting and tremors.      63 yo male well known to ER with h/o alcohol abuse presents via EMS c/o nausea, vomiting and tremors.  Last alcoholic drink this morning.  Denies recent injury or fall.  Says he [facility] detox [facility].        History provided by:  Patient   used: No    Withdrawal - Alcohol  Associated symptoms: nausea and vomiting        Prior to Admission Medications   Prescriptions Last Dose Informant Patient Reported? Taking?   Blood Glucose Monitoring Suppl (ONE TOUCH ULTRA MINI) w/Device KIT  Self Yes No   Sig: Use as directed   Patient not taking: Reported on 3/25/2024   Blood Pressure Monitoring (Adult Blood Pressure Cuff Lg) KIT   No No   Sig: Use in the morning   Patient not taking: Reported on 3/25/2024   Breo Ellipta 200-25 MCG/ACT inhaler  Self No No   Sig: Inhale 1 puff daily Rinse mouth after use.   Patient not taking: Reported on 3/25/2024   ONETOUCH DELICA LANCETS FINE MISC  Self Yes No   Sig: 3 (three) times a day Test   Thiamine HCl (vitamin B-1) 100 MG TABS  Self No No   Sig: TAKE 1 TABLET DAILY   Patient not taking: Reported on 3/25/2024   albuterol (Ventolin HFA) 90 mcg/act inhaler  Self No No   Sig: Inhale 2 puffs every 4 (four) hours as needed for wheezing   Patient not taking: Reported on 3/25/2024   aluminum-magnesium hydroxide-simethicone (MAALOX) 8262-6220-755 mg/30 mL suspension   No No   Sig: Take 30 mL by mouth every 4 (four) hours as needed for indigestion or heartburn   Patient not taking: Reported on 3/25/2024   apixaban (ELIQUIS) 5 mg   No No   Sig: Take 1 tablet (5 mg total) by mouth 2 (two) times a day Do not start before January 31, 2024.   aspirin (ECOTRIN LOW STRENGTH) 81 mg EC tablet  Self Yes No   Sig: Take 81 mg by mouth daily   ferrous sulfate 325 (65 Fe) mg tablet   Self No No   Sig: Take 1 tablet (325 mg total) by mouth daily with breakfast   folic acid (FOLVITE) 1 mg tablet   No No   Sig: TAKE 1 TABLET DAILY   gabapentin (NEURONTIN) 300 mg capsule   No No   Sig: TAKE ONE CAPSULE THREE TIMES DAILY   lisinopril (ZESTRIL) 20 mg tablet  Self No No   Sig: Take 0.5 tablets (10 mg total) by mouth daily   magnesium Oxide (MAG-OX) 400 mg TABS   No No   Sig: Take 1 tablet (400 mg total) by mouth 2 (two) times a day   metFORMIN (GLUCOPHAGE) 500 mg tablet   No No   Sig: TAKE 1 TABLET DAILY WITH BREAKFAST   metoprolol tartrate (LOPRESSOR) 50 mg tablet   No No   Sig: Take 1 tablet (50 mg total) by mouth every 12 (twelve) hours   naltrexone (REVIA) 50 mg tablet  Self No No   Sig: Take 1 tablet (50 mg total) by mouth daily   Patient not taking: Reported on 3/25/2024   nicotine (NICODERM CQ) 21 mg/24 hr TD 24 hr patch  Self No No   Sig: Place 1 patch on the skin over 24 hours daily Do not start before December 4, 2023.   Patient not taking: Reported on 3/25/2024   pantoprazole (PROTONIX) 40 mg tablet   No No   Sig: TAKE 1 TABLET TWO TIMES A DAY   pravastatin (PRAVACHOL) 40 mg tablet  Self No No   Sig: TAKE 1 TABLET DAILY WITH DINNER   ranolazine (RANEXA) 500 mg 12 hr tablet   No No   Sig: TAKE 1 TABLET EVERY 12 HOURS   senna-docusate sodium (SENOKOT S) 8.6-50 mg per tablet   No No   Sig: Take 1 tablet by mouth daily as needed for constipation   Patient not taking: Reported on 3/25/2024   tamsulosin (FLOMAX) 0.4 mg   No No   Sig: TAKE 1 CAPSULE DAILY   varenicline (CHANTIX) 1 mg tablet   No No   Sig: TAKE 1 TABLET 2 TIMES A DAY   Patient not taking: Reported on 3/25/2024      Facility-Administered Medications: None       Past Medical History:   Diagnosis Date    Acute on chronic kidney failure  (HCC)     Alcohol withdrawal (HCC) 06/07/2019    Atrial fibrillation (HCC)     Cancer (HCC)     prostate ca,had radiation    Cardiac disease     stents,then triple bypass    COPD (chronic obstructive  pulmonary disease) ([facility])     Coronary artery disease     ETOH abuse     Heart failure ([facility])     History of heart surgery     says triple bypass [facility]    Hx of heart artery stent     2014    Hyperlipidemia     Hypertension     Hypovolemic shock ([facility]) 12/22/2019    Lumbar spondylitis ([facility]) 10/13/2022    Nasal bone fracture 10/10/2022    Prostate CA ([facility])     S/P CABG x 3     2004    Sleep apnea        Past Surgical History:   Procedure Laterality Date    CARDIAC CATHETERIZATION      2 stents    CORONARY ARTERY BYPASS GRAFT  2004    ME ARTHRD ANT INTERBODY MIN DSC CRV BELOW C2 N/A 12/16/2020    Procedure: Anterior cervical discectomy with fusion C4-C7; Posterior cervical decompression and fusion C2-T2;  Surgeon: David Rowell MD;  Location: [facility];  Service: Neurosurgery    TONSILLECTOMY         Family History   Problem Relation Age of Onset    Diabetes Mother     Uterine cancer Mother     COPD Father     Hypertension Father      I have reviewed and agree with the history as documented.    E-Cigarette/Vaping    E-Cigarette Use Never User      E-Cigarette/Vaping Substances    Nicotine Yes     THC No     CBD No     Flavoring No     Other No     Unknown No      Social History     Tobacco Use    Smoking status: Every Day     Current packs/day: 1.50     Average packs/day: 1.5 packs/day for 40.0 years (60.0 ttl pk-yrs)     Types: Cigarettes    Smokeless tobacco: Never   Vaping Use    Vaping status: Never Used   Substance Use Topics    Alcohol use: Yes     Alcohol/week: 4.0 standard drinks of alcohol     Types: 4 Standard drinks or equivalent per week     Comment: quart of vodka daily    Drug use: No       Review of Systems   Constitutional:  Negative for fever.   Respiratory:  Negative for cough.    Cardiovascular:  Negative for chest pain.   Gastrointestinal:  Positive for nausea and vomiting.   Musculoskeletal:  Negative for back pain.   Skin:  Negative for wound.   Neurological:  Positive for tremors.        Physical Exam  Physical Exam  Vitals and nursing note reviewed.   Constitutional:       General: He is not in acute distress.     Appearance: He is well-developed. He is not ill-appearing or diaphoretic.   HENT:      Head: Normocephalic and atraumatic.   Eyes:      General: No scleral icterus.     Conjunctiva/sclera: Conjunctivae normal.      Pupils: Pupils are equal, round, and reactive to light.   Cardiovascular:      Rate and Rhythm: Regular rhythm. Tachycardia present.      Heart sounds: Normal heart sounds. No murmur heard.  Pulmonary:      Effort: Pulmonary effort is normal. No respiratory distress.      Breath sounds: Normal breath sounds.   Abdominal:      General: Bowel sounds are normal. There is no distension.      Palpations: Abdomen is soft.      Tenderness: There is no abdominal tenderness.   Musculoskeletal:         General: No tenderness or deformity. Normal range of motion.      Cervical back: Normal range of motion and neck supple.   Skin:     General: Skin is warm and dry.      Coloration: Skin is not pale.      Findings: No erythema or rash.   Neurological:      General: No focal deficit present.      Mental Status: He is alert and oriented to person, place, and time.      Cranial Nerves: No cranial nerve deficit.      Motor: No weakness.      Comments: + mild tremulousness; pt. Alert and oriented and follows commands, no drift, no droop, motor/speech intact   Psychiatric:         Mood and Affect: Mood normal.         Behavior: Behavior normal.         Vital Signs  ED Triage Vitals   Temperature Pulse Respirations Blood Pressure SpO2   05/21/24 2016 05/21/24 1858 05/21/24 1858 05/21/24 1858 05/21/24 1858   99.5 °F (37.5 °C) (!) 168 (!) 26 (!) 213/131 93 %      Temp Source Heart Rate Source Patient Position - Orthostatic VS BP Location FiO2 (%)   05/21/24 2016 05/21/24 1858 05/21/24 1858 05/21/24 1858 --   Tympanic Monitor Sitting Right arm       Pain Score       05/21/24 2016       No Pain            Vitals:    05/21/24 2019 05/21/24 2152 05/21/24 2153 05/21/24 2338   BP: (!) 190/96 (!) 146/105 (!) 146/105 148/69   Pulse: (!) 117 (!) 132 (!) 132 99   Patient Position - Orthostatic VS:   Sitting          Visual Acuity      ED Medications  Medications   sodium chloride 0.9 % bolus 1,000 mL (1,000 mL Intravenous New Bag 5/21/24 1932)   ondansetron (ZOFRAN) injection 4 mg (4 mg Intravenous Given 5/21/24 1934)   LORazepam (ATIVAN) injection 1 mg (1 mg Intravenous Given 5/21/24 1935)   LORazepam (ATIVAN) injection 1 mg (1 mg Intravenous Given 5/21/24 2256)       Diagnostic Studies  Results Reviewed       Procedure Component Value Units Date/Time    Protime-INR [[id number]]  (Normal) Collected: 05/21/24 2013    Lab Status: Final result Specimen: Blood from Arm, Right Updated: 05/21/24 2038     Protime 12.6 seconds      INR 0.93    APTT [[id number]]  (Normal) Collected: 05/21/24 2013    Lab Status: Final result Specimen: Blood from Arm, Right Updated: 05/21/24 2038     PTT 28 seconds     Comprehensive metabolic panel [[id number]]  (Abnormal) Collected: 05/21/24 1928    Lab Status: Final result Specimen: Blood from Arm, Right Updated: 05/21/24 2000     Sodium 142 mmol/L      Potassium 5.2 mmol/L      Chloride 106 mmol/L      CO2 24 mmol/L      ANION GAP 12 mmol/L      BUN 18 mg/dL      Creatinine 1.00 mg/dL      Glucose 78 mg/dL      Calcium 8.4 mg/dL      AST 91 U/L      ALT 40 U/L      Alkaline Phosphatase 80 U/L      Total Protein 7.3 g/dL      Albumin 3.9 g/dL      Total Bilirubin 0.63 mg/dL      eGFR 80 ml/min/1.73sq m     Narrative:      National Kidney Disease Foundation guidelines for Chronic Kidney Disease (CKD):     Stage 1 with normal or high GFR (GFR > 90 mL/min/1.73 square meters)    Stage 2 Mild CKD (GFR = 60-89 mL/min/1.73 square meters)    Stage 3A Moderate CKD (GFR = 45-59 mL/min/1.73 square meters)    Stage 3B Moderate CKD (GFR = 30-44 mL/min/1.73 square meters)    Stage 4 Severe CKD (GFR = 15-29  mL/min/1.73 square meters)    Stage 5 End Stage CKD (GFR <15 mL/min/1.73 square meters)  Note: GFR calculation is accurate only with a steady state creatinine    Lipase [[id number]]  (Normal) Collected: 05/21/24 1928    Lab Status: Final result Specimen: Blood from Arm, Right Updated: 05/21/24 2000     Lipase 53 u/L     Ethanol [[id number]]  (Abnormal) Collected: 05/21/24 1928    Lab Status: Final result Specimen: Blood from Arm, Right Updated: 05/21/24 2000     Ethanol Lvl 170 mg/dL     CBC and differential [[id number]]  (Abnormal) Collected: 05/21/24 1928    Lab Status: Final result Specimen: Blood from Hand, Right Updated: 05/21/24 1934     WBC 5.32 Thousand/uL      RBC 3.77 Million/uL      Hemoglobin 12.4 g/dL      Hematocrit 37.8 %       fL      MCH 32.9 pg      MCHC 32.8 g/dL      RDW 14.5 %      MPV 9.6 fL      Platelets 146 Thousands/uL      nRBC 0 /100 WBCs      Segmented % 60 %      Immature Grans % 0 %      Lymphocytes % 21 %      Monocytes % 15 %      Eosinophils Relative 2 %      Basophils Relative 2 %      Absolute Neutrophils 3.11 Thousands/µL      Absolute Immature Grans 0.02 Thousand/uL      Absolute Lymphocytes 1.12 Thousands/µL      Absolute Monocytes 0.82 Thousand/µL      Eosinophils Absolute 0.13 Thousand/µL      Basophils Absolute 0.12 Thousands/µL                    No orders to display              Procedures  ECG 12 Lead Documentation Only    Date/Time: 5/21/2024 7:16 PM    Performed by: Marilia Martinez MD  Authorized by: Marilia Martinez MD    Indications / Diagnosis:  Alcohol withdrawal  ECG reviewed by me, the ED Provider: yes    Patient location:  ED  Previous ECG:     Previous ECG:  Unavailable  Interpretation:     Interpretation: abnormal    Rate:     ECG rate:  117    ECG rate assessment: tachycardic    Rhythm:     Rhythm: sinus tachycardia    Ectopy:     Ectopy: none    QRS:     QRS axis:  Right  Conduction:     Conduction: normal    ST segments:     ST segments:  Normal  T waves:      T waves: normal             ED Course                                             Medical Decision Making  Discussed with [facility] who is not taking admissions tonight - says pt. Has to stay here or be admitted to [facility] if needed and then consult tox in the morning.  [facility] came to see pt. And they will get him admitted either [facility] or [facility] in the morning.  Pt. Medicated with Ativan IV for tremulousness.  0030 - pt. Has been cooperative here thus far.  ETOH 170 at 1930.  At this point, if pt. Changes his mind and wants to leave he can.      Amount and/or Complexity of Data Reviewed  Labs: ordered.    Risk  Prescription drug management.             Disposition  Final diagnoses:   Alcohol withdrawal (HCC)   Alcohol abuse     Time reflects when diagnosis was documented in both MDM as applicable and the Disposition within this note       Time User Action Codes Description Comment    5/21/2024  8:32 PM Marilia Martinez Add [F10.939] Alcohol withdrawal (HCC)     5/21/2024  8:32 PM Mariila Martinez Add [F10.10] Alcohol abuse           ED Disposition       ED Disposition   Transfer to Another Facility-In Network    Condition   --    Date/Time   Tue May 21, 2024  8:32 PM    Comment   Gregg Partida should be transferred out to [facility]  .               Follow-up Information    None         Patient's Medications   Discharge Prescriptions    No medications on file       No discharge procedures on file.    PDMP Review         Value Time User    PDMP Reviewed  Yes 1/24/2024  9:23 AM Tyrone Freire DO            ED Provider  Electronically Signed by             Marilia Martinez MD  05/22/24 0028

## 2024-05-22 ENCOUNTER — TELEPHONE (OUTPATIENT)
Age: 64
End: 2024-05-22

## 2024-05-22 VITALS
OXYGEN SATURATION: 94 % | HEIGHT: 74 IN | BODY MASS INDEX: 24.27 KG/M2 | DIASTOLIC BLOOD PRESSURE: 103 MMHG | TEMPERATURE: 98.8 F | RESPIRATION RATE: 18 BRPM | HEART RATE: 101 BPM | SYSTOLIC BLOOD PRESSURE: 173 MMHG

## 2024-05-22 PROCEDURE — 96361 HYDRATE IV INFUSION ADD-ON: CPT

## 2024-05-22 NOTE — ED NOTES
ER MD spoke with patient at this time and he asked to go home and have ORP set him up for detox from home.Patient to be discharged for home.     Margie Mayfield RN  05/22/24 7391

## 2024-05-22 NOTE — ED NOTES
Provider aware of CIWA score and vitals. IV fluids infusing slowly as IV is positional. Pt provided food per request ( er lunchbox)      Cassie Benton RN  05/21/24 4195

## 2024-05-22 NOTE — ED NOTES
"5/22/24 @ 0755:  As per ED MD, patient decided he wanted to \"go home and sign my lease.\"  [facility]  [facility].    MS Jay  "

## 2024-05-22 NOTE — ED NOTES
No one to do an admission @ [facility].    20:40 - Yue called - she will be in to see patient shortly.    21:15 - ON[facility]/Gloria arrived - face sheet provided and escort to the patient.  John believes there will be d/c's tomorrow @ [place] - ER should refer again in the morning - John will also have her staff looking into bed availability if this should fail.

## 2024-05-22 NOTE — ED CARE HANDOFF
Emergency Department Sign Out Note        Sign out and transfer of care from Dr. Liang. See Separate Emergency Department note.     The patient, Gregg Partida, was evaluated by the previous provider for Alcohol Detox.    Workup Completed:  Lab work    ED Course / Workup Pending (followup):  Patient signed out to me from previous provider pending Ortho reevaluation in the morning for possible [facility] detox.  [facility] was not accepting admissions overnight.  Reevaluation in the morning went to speak with the patient to make sure he was still interested in going to [facility] detox.  At this time patient wanted to go home stating he had the reciting his lease get some close and other things in order before he could go to detox.  Will discharge the patient and advised patient when he is ready he can call the work number and they can help him get into a detox at that time if he is ready.                                     Procedures  Medical Decision Making  Amount and/or Complexity of Data Reviewed  Labs: ordered.    Risk  Prescription drug management.            Disposition  Final diagnoses:   Alcohol withdrawal (HCC)   Alcohol abuse   Alcohol intoxication (HCC)     Time reflects when diagnosis was documented in both MDM as applicable and the Disposition within this note       Time User Action Codes Description Comment    5/21/2024  8:32 PM Marilia Martinez Add [F10.939] Alcohol withdrawal (HCC)     5/21/2024  8:32 PM Marilia Martinez Add [F10.10] Alcohol abuse     5/22/2024  7:54 AM Quinn Krishna Add [F10.929] Alcohol intoxication (HCC)           ED Disposition       ED Disposition   Discharge    Condition   Stable    Date/Time   Wed May 22, 2024  7:54 AM    Comment   Gregg Partida should be transferred out to [facility]  .               Follow-up Information       Follow up With Specialties Details Why Contact Info Additional Information    [facility]  In 1 day       formerly [facility]  Emergency Department Emergency Medicine  If symptoms worsen [address] [facility] [address]  [phone] [facility] Emergency Department, [address] [place] [address], [phone]          Patient's Medications   Discharge Prescriptions    No medications on file     No discharge procedures on file.       ED Provider  Electronically Signed by     Quinn Krishna DO  05/22/24 0758

## 2024-05-22 NOTE — TELEPHONE ENCOUNTER
Contacted Patient regarding recent ED Visit .Patient was seen in [facility] ED 5/21/24    Patient reports taking Rx as prescribed : N/A no Rx's prescribed.  Patient denies any fever at this time: N/A    S/w Patient, states he is feeling much better since ED Discharge. Patient states he would like to do a Detox program however, has some person things that need to be handled before doing so. Patient is inquiring who he should be contacting for out patient Follow up. He states he was informed he would be receiving a call to inform him.    Contactd Sesar @ [facility] who provided contact information for [employer] [facility] contact (eJnnifer).S/w Jennifer, she was okay with me giving patient her contact information to provide to patient to help coordinate or arrange assistance with detox.    Reached back out to provide contact information for Jennifer however, Patient states he is on the bus and will call me back in an hour. Unable to leave  with information as VM box is not set up yet. Strongly encouraged patient to call back for [employer] contact infor- Jennifer [phone]      Advised patient to contact the office with new or worsen symptoms as we have On Call Provider 24 hrs. Provided office hours and phone. Patient does not wish to schedule Follow up appointment w/ PCP at this time. No further action needed at this time.    ED:[facility]   CC: Withdrawal-Alcohol     DX:  Alcohol withdrawal-Alcohol abuse; Alcohol intoxication.  Time: 6:56 pm  Patient does not wish to schedule Follow up appointment at this time. No further action needed.

## 2024-05-22 NOTE — ED NOTES
Ambulated to BR with walker and stand by assists, gait steady     Cassie Benton RN  05/21/24 9485

## 2024-05-23 ENCOUNTER — HOSPITAL ENCOUNTER (EMERGENCY)
Facility: HOSPITAL | Age: 64
Discharge: HOME | End: 2024-05-24
Attending: EMERGENCY MEDICINE
Payer: COMMERCIAL

## 2024-05-23 DIAGNOSIS — F10.929 ALCOHOL INTOXICATION (HCC): Primary | ICD-10-CM

## 2024-05-23 PROCEDURE — 99284 EMERGENCY DEPT VISIT MOD MDM: CPT | Performed by: EMERGENCY MEDICINE

## 2024-05-23 PROCEDURE — 99283 EMERGENCY DEPT VISIT LOW MDM: CPT

## 2024-05-23 RX ORDER — CHLORDIAZEPOXIDE HYDROCHLORIDE 25 MG/1
50 CAPSULE, GELATIN COATED ORAL ONCE
Status: COMPLETED | OUTPATIENT
Start: 2024-05-23 | End: 2024-05-23

## 2024-05-23 RX ORDER — ONDANSETRON 4 MG/1
4 TABLET, ORALLY DISINTEGRATING ORAL ONCE
Status: COMPLETED | OUTPATIENT
Start: 2024-05-23 | End: 2024-05-23

## 2024-05-23 RX ADMIN — CHLORDIAZEPOXIDE HYDROCHLORIDE 50 MG: 25 CAPSULE ORAL at 19:43

## 2024-05-23 RX ADMIN — ONDANSETRON 4 MG: 4 TABLET, ORALLY DISINTEGRATING ORAL at 19:43

## 2024-05-23 RX ADMIN — CHLORDIAZEPOXIDE HYDROCHLORIDE 50 MG: 25 CAPSULE ORAL at 16:00

## 2024-05-23 NOTE — ED CARE HANDOFF
Emergency Department Sign Out Note        Sign out and transfer of care from dr.. frazier. See Separate Emergency Department note.     The patient, Gregg Partida, was evaluated by the previous provider for alcohol intoxication.    Workup Completed:   Medical workup done and patient receiving librium protocol    ED Course / Workup Pending (followup):    1936 - pt starting to have some shakes, states he would like more meds.  Pt is due for another librium  dose per protocol.  VSS.  Awaiting transport to [facility] maybe later tonight with slets                                     Procedures  Medical Decision Making  Risk  Prescription drug management.            Disposition  Final diagnoses:   None     ED Disposition       None          Follow-up Information    None       Patient's Medications   Discharge Prescriptions    No medications on file     No discharge procedures on file.       ED Provider  Electronically Signed by     Valentin Monk DO  05/23/24 1938

## 2024-05-23 NOTE — ED NOTES
Pt requested lunch. Lm for cafeteria aron cheeseburger,  salad, lisa slaw, Libyan dressing, 2 iced tea with 2 splungers      Brenna Olson  05/23/24 6006

## 2024-05-23 NOTE — ED NOTES
Crisis worker went in to inform patient he was accepted at [facility] for detox, he became angry and said he had no clothes to bring with him, he also asked why he could not go where he was supposed to be taken today by ORP representative, and was informed they will not come back to see him due to him not answering his door this morning to be transferred to the facility.Patient noted to be upset.ER MD spoke with patient and brother being called to have clothes brought for him to take to [facility].     Margie Mayfield RN  05/23/24 8268

## 2024-05-23 NOTE — ED NOTES
5/23/24 @ 1515:  Myranda from [facility] called and requested that PES forward patient's clinical to [facility]. MS Jay    7425: PES faxed clinical to [facility]; they have bed available.  MS Jay

## 2024-05-23 NOTE — ED PROVIDER NOTES
History  Chief Complaint   Patient presents with    Alcohol Intoxication     Pt arrives [facility]. He is intoxicated and shaking. Reported of missing his [facility] meeting     Patient brought in by [facility] for evaluation for detox.  Patient was here he previously and or Pat arrange for him to go to detox however when the time came for transportation he said he had to go home and take care of things before he went.  He was going to follow-up with them today.  He states he was unable to get a hold of them and missed his meeting because of the rain so he called the ambulance to come to the ER.      History provided by:  Patient   used: No    Alcohol Intoxication      Prior to Admission Medications   Prescriptions Last Dose Informant Patient Reported? Taking?   Blood Glucose Monitoring Suppl (ONE TOUCH ULTRA MINI) w/Device KIT  Self Yes No   Sig: Use as directed   Patient not taking: Reported on 3/25/2024   Blood Pressure Monitoring (Adult Blood Pressure Cuff Lg) KIT   No No   Sig: Use in the morning   Patient not taking: Reported on 3/25/2024   Breo Ellipta 200-25 MCG/ACT inhaler  Self No No   Sig: Inhale 1 puff daily Rinse mouth after use.   Patient not taking: Reported on 3/25/2024   ONETOUCH DELICA LANCETS FINE MISC  Self Yes No   Sig: 3 (three) times a day Test   Thiamine HCl (vitamin B-1) 100 MG TABS  Self No No   Sig: TAKE 1 TABLET DAILY   Patient not taking: Reported on 3/25/2024   albuterol (Ventolin HFA) 90 mcg/act inhaler  Self No No   Sig: Inhale 2 puffs every 4 (four) hours as needed for wheezing   Patient not taking: Reported on 3/25/2024   aluminum-magnesium hydroxide-simethicone (MAALOX) 8393-2591-600 mg/30 mL suspension   No No   Sig: Take 30 mL by mouth every 4 (four) hours as needed for indigestion or heartburn   Patient not taking: Reported on 3/25/2024   apixaban (ELIQUIS) 5 mg   No No   Sig: Take 1 tablet (5 mg total) by mouth 2 (two) times a day Do not start before January 31, 2024.   aspirin  (ECOTRIN LOW STRENGTH) 81 mg EC tablet  Self Yes No   Sig: Take 81 mg by mouth daily   ferrous sulfate 325 (65 Fe) mg tablet  Self No No   Sig: Take 1 tablet (325 mg total) by mouth daily with breakfast   folic acid (FOLVITE) 1 mg tablet   No No   Sig: TAKE 1 TABLET DAILY   gabapentin (NEURONTIN) 300 mg capsule   No No   Sig: TAKE ONE CAPSULE THREE TIMES DAILY   lisinopril (ZESTRIL) 20 mg tablet  Self No No   Sig: Take 0.5 tablets (10 mg total) by mouth daily   magnesium Oxide (MAG-OX) 400 mg TABS   No No   Sig: Take 1 tablet (400 mg total) by mouth 2 (two) times a day   metFORMIN (GLUCOPHAGE) 500 mg tablet   No No   Sig: TAKE 1 TABLET DAILY WITH BREAKFAST   metoprolol tartrate (LOPRESSOR) 50 mg tablet   No No   Sig: Take 1 tablet (50 mg total) by mouth every 12 (twelve) hours   naltrexone (REVIA) 50 mg tablet  Self No No   Sig: Take 1 tablet (50 mg total) by mouth daily   Patient not taking: Reported on 3/25/2024   nicotine (NICODERM CQ) 21 mg/24 hr TD 24 hr patch  Self No No   Sig: Place 1 patch on the skin over 24 hours daily Do not start before December 4, 2023.   Patient not taking: Reported on 3/25/2024   pantoprazole (PROTONIX) 40 mg tablet   No No   Sig: TAKE 1 TABLET TWO TIMES A DAY   pravastatin (PRAVACHOL) 40 mg tablet  Self No No   Sig: TAKE 1 TABLET DAILY WITH DINNER   ranolazine (RANEXA) 500 mg 12 hr tablet   No No   Sig: TAKE 1 TABLET EVERY 12 HOURS   senna-docusate sodium (SENOKOT S) 8.6-50 mg per tablet   No No   Sig: Take 1 tablet by mouth daily as needed for constipation   Patient not taking: Reported on 3/25/2024   tamsulosin (FLOMAX) 0.4 mg   No No   Sig: TAKE 1 CAPSULE DAILY   varenicline (CHANTIX) 1 mg tablet   No No   Sig: TAKE 1 TABLET 2 TIMES A DAY   Patient not taking: Reported on 3/25/2024      Facility-Administered Medications: None       Past Medical History:   Diagnosis Date    Acute on chronic kidney failure  (HCC)     Alcohol withdrawal (HCC) 06/07/2019    Atrial fibrillation (HCC)      Cancer (HCC)     prostate ca,had radiation    Cardiac disease     stents,then triple bypass    COPD (chronic obstructive pulmonary disease) (HCC)     Coronary artery disease     ETOH abuse     Heart failure (HCC)     History of heart surgery     says triple bypass [facility]    Hx of heart artery stent     2014    Hyperlipidemia     Hypertension     Hypovolemic shock ([facility]) 12/22/2019    Lumbar spondylitis ([facility]) 10/13/2022    Nasal bone fracture 10/10/2022    Prostate CA ([facility])     S/P CABG x 3     2004    Sleep apnea        Past Surgical History:   Procedure Laterality Date    CARDIAC CATHETERIZATION      2 stents    CORONARY ARTERY BYPASS GRAFT  2004    IA ARTHRD ANT INTERBODY MIN DSC CRV BELOW C2 N/A 12/16/2020    Procedure: Anterior cervical discectomy with fusion C4-C7; Posterior cervical decompression and fusion C2-T2;  Surgeon: David Rowell MD;  Location: [facility];  Service: Neurosurgery    TONSILLECTOMY         Family History   Problem Relation Age of Onset    Diabetes Mother     Uterine cancer Mother     COPD Father     Hypertension Father      I have reviewed and agree with the history as documented.    E-Cigarette/Vaping    E-Cigarette Use Never User      E-Cigarette/Vaping Substances    Nicotine Yes     THC No     CBD No     Flavoring No     Other No     Unknown No      Social History     Tobacco Use    Smoking status: Every Day     Current packs/day: 1.50     Average packs/day: 1.5 packs/day for 40.0 years (60.0 ttl pk-yrs)     Types: Cigarettes    Smokeless tobacco: Never   Vaping Use    Vaping status: Never Used   Substance Use Topics    Alcohol use: Yes     Alcohol/week: 4.0 standard drinks of alcohol     Types: 4 Standard drinks or equivalent per week     Comment: quart of vodka daily    Drug use: No       Review of Systems   All other systems reviewed and are negative.      Physical Exam  Physical Exam  Vitals and nursing note reviewed.   Constitutional:       General: He is not in acute  distress.  Cardiovascular:      Rate and Rhythm: Normal rate and regular rhythm.   Pulmonary:      Effort: Pulmonary effort is normal. No respiratory distress.      Breath sounds: Normal breath sounds.   Neurological:      General: No focal deficit present.      Mental Status: He is alert and oriented to person, place, and time.         Vital Signs  ED Triage Vitals [05/23/24 1250]   Temperature Pulse Respirations Blood Pressure SpO2   97.9 °F (36.6 °C) (!) 146 20 165/95 95 %      Temp Source Heart Rate Source Patient Position - Orthostatic VS BP Location FiO2 (%)   Tympanic Monitor Lying Left arm --      Pain Score       No Pain           Vitals:    05/23/24 1250 05/23/24 1253 05/23/24 1315 05/23/24 1430   BP: 165/95   (!) 166/112   Pulse: (!) 146 88 86 92   Patient Position - Orthostatic VS: Lying            Visual Acuity      ED Medications  Medications - No data to display    Diagnostic Studies  Results Reviewed       None                   No orders to display              Procedures  Procedures         ED Course                                             Medical Decision Making  Pulse ox 98% on room air indicating adequate oxygenation.        Patient medically clear for detox.    Signed out to next provider Dr. Monk pending detox placement.    Risk  Prescription drug management.             Disposition  Final diagnoses:   None     ED Disposition       None          Follow-up Information    None         Patient's Medications   Discharge Prescriptions    No medications on file       No discharge procedures on file.    PDMP Review         Value Time User    PDMP Reviewed  Yes 1/24/2024  9:23 AM Tyrone Freire DO            ED Provider  Electronically Signed by             Quinn Krishna DO  05/23/24 4895

## 2024-05-23 NOTE — ED NOTES
"17:20 - called [facility] and then faxed over updated vitals as requested.    17:50 - [facility] accepted - patient informed and upset because he has no clothes - attempting to call his son to bring him some clothes.  ONUR/Nan called as the patient asked for her specifically (on vacation).    18:05 - Round-trip ordered - [facility] texted back - no crew for tonight - PES called [facility] and they were ok with an arrival tomorrow but requested to know the time - informed [facility] who was not able to provide a time right now.    19:30 - called BRANDON/Ashley - maybe 00:00 - 00:30; if not then 1st trip in the morning; ER staff advised.    20:45 - SO called with approximate time of arrival time - then SO asked PES to do a precert - called [phone] -  [facility] managed by [facility] - spoke with Ivan AHUMADA who asked for a \"billing code\" which PES nor SO knew - therefore a precert could not be completed - this is now a \"notification of admission\" with refernce#: [id number] - SO UR (Maximiliano YOUNGBLOOD) will need to call [facility] tomorrow to complete this precert.    21:10 - This note faxed to SO - called to verify.    22:35 - [facility] texted PES - p/u will be 07:00.  SO and ER staff advised.  "

## 2024-05-23 NOTE — ED CARE HANDOFF
UPDATE: Spoke to Batsheva from Crisis and she will follow up with pt    Left vmail with ONUR re: warm handoff

## 2024-05-24 VITALS
TEMPERATURE: 97.9 F | HEART RATE: 75 BPM | OXYGEN SATURATION: 97 % | RESPIRATION RATE: 20 BRPM | WEIGHT: 189 LBS | BODY MASS INDEX: 24.27 KG/M2 | SYSTOLIC BLOOD PRESSURE: 158 MMHG | DIASTOLIC BLOOD PRESSURE: 93 MMHG

## 2024-05-24 NOTE — ED NOTES
Pt sleeping when this RN attempted to perform CIWA. Will allow pt to sleep due to previous restlessness and will assess pt when he awakes.      Yara Olivera, CHRISTIE  05/24/24 0103

## 2024-05-24 NOTE — ED NOTES
Patient seen walking around fast track using walker, no difficulties at this time.      Rebecca Rodriguez RN  05/24/24 3699

## 2024-05-28 LAB
ATRIAL RATE: 117 BPM
P AXIS: 66 DEGREES
PR INTERVAL: 136 MS
QRS AXIS: 99 DEGREES
QRSD INTERVAL: 86 MS
QT INTERVAL: 302 MS
QTC INTERVAL: 421 MS
T WAVE AXIS: 16 DEGREES
VENTRICULAR RATE: 117 BPM

## 2024-05-28 PROCEDURE — 93010 ELECTROCARDIOGRAM REPORT: CPT | Performed by: INTERNAL MEDICINE

## 2024-05-29 NOTE — TELEPHONE ENCOUNTER
May 29, 2024       Corby Silver MD  [address] [address]  Via Fax: [phone]      Patient: Tania Espitia   YOB: 1944   Date of Visit: 2024       Dear Dr. Silver:    Thank you for referring Tania Espitia to me for evaluation. Below are my notes for this visit with her.    If you have questions, please do not hesitate to call me. I look forward to following your patient along with you.      Sincerely,        Corby Aceves MD        CC: Corby Mark MD  2024 11:48 AM  Signed    [facility]  Outpatient Visit    Patient Name: Tania Espitia  MRN:[id number]  :1944    PCP:  Corby Silver MD  [phone]    Referring Provider: Corby Silver MD  [address] [id number]    Cardiologist: CASE Zuleta mD    History Obtained From:  patient, electronic medical record    Chief Complaint:   Chief Complaint   Patient presents with   • Follow-up     6 months   • Atrial Fibrillation   • Office Visit     Denies chest pain, shortness of breath and dizziness       HPI:      Tania Espitia is a 79 year old female who presents in follow up for atrial fibrillation and tachycardia.    The patient's PMH includes HTN, PAF, HFpEF, and DM.     She reports PAF first started in . She has been on dabigatran since. She reports symptoms of palpitations, chest pressure, and weakness. She reports it feels like \"I have the flu\". She underwent MANA/DCCV. We were then following a watchful waiting approach and she had several more episodes, one requiring DCCV 2023. She thus underwent PVI on 2023.     She is in NSR in follow up doing great.     She has an Apple Watch for rhythm monitoring.     On ivabridine, HR's are well controlled.     Past medical history:    Past Medical History:   Diagnosis Date   • Arthropathy    • Atrial fibrillation  (CMD)    • Back pain    •  Dr Kevin Thomson      This patient called very upset looking for the hearth medication, but he did not let me look and hang up, he has an appt on 3-2 with Dr Melissa Winston  I tried to call him back and tell him to let me help,  he said, I am out for almost 7 days with out my hearth medication and I try go get the name of the med  He still hanging up the phone again  CHF (congestive heart failure)  (CMD)    • GERD (gastroesophageal reflux disease)    • H/O laminectomy    • HLD (hyperlipidemia)    • HTN (hypertension)    • Nephrolithiasis    • Osteopenia    • Paroxysmal atrial fibrillation  (CMD) 10/19/2020    Echo (1/2/19): LVEF 65%, trivial AS (PV 1.8, mean gradient 7). [id number] score is 4 (age, gender, HTN). Echo in [place] (8/2/21): LVEF 65%, grade 1 DD, mild MR, mild TR. MANA (1/12/23) for recurrent AF: LVEF 70%, no AS, mild AR, no SHYANNE thrombus. MANA DCCV (1/12/23) to NSR.  DCCV (5/30/23) for recurrent AF.   • Type 2 diabetes mellitus  (CMD)        Past Surgical History:    Past Surgical History:   Procedure Laterality Date   • ----------mammogram----------  05/23/2019   • Colonoscopy  06/26/2015   • Knee surgery Bilateral     3, 5 years ago   • Spine surgery  2016    L4-5 spinal stenosis;       Family History:   Family History   Problem Relation Age of Onset   • Heart disease Mother    • Hypertension Mother    • Stroke Mother    • Heart disease Father    • Stroke Father    • Hypertension Father        Allergies: ALLERGIES:  No Known Allergies    Medications Including OTC:  Current Outpatient Medications   Medication Sig Dispense Refill   • amLODIPine (NORVASC) 5 MG tablet Take 5 mg by mouth every afternoon.     • apixaBAN (ELIQUIS) 5 MG Tab Take 1 tablet by mouth every 12 hours.     • diazePAM (Valium) 2 MG tablet Take 2.5 tablets by mouth every 8 hours as needed for Muscle spasms. 15 tablet 0   • carvedilol (COREG) 25 MG tablet TAKE 1 TABLET BY MOUTH IN THE MORNING AND IN THE EVENING WITH MEALS 180 tablet 1   • ivabradine (CORLANOR) 5 MG tablet Take 1 tablet by mouth in the morning and 1 tablet in the evening. 180 tablet 3   • omeprazole (PriLOSEC) 40 MG capsule Take 40 mg by mouth daily.     • ondansetron (ZOFRAN ODT) 4 MG disintegrating tablet Place 4 mg onto the tongue every 8 hours as needed for Nausea.     • rosuvastatin (CRESTOR) 20 MG tablet Take 20 mg by mouth every  other day.     • Trulicity 1.5 MG/0.5ML pen-injector Inject 1.5 mg into the skin 1 day a week.     • metFORMIN (GLUCOPHAGE) 500 MG tablet Take 1,000 mg by mouth nightly.     • sucralfate (CARAFATE) 1 g tablet Take 1 g by mouth as needed (upset stomach).     • blood glucose test strip by Other route daily.     • traMADol (ULTRAM) 50 MG tablet Take 50 mg by mouth every morning.     • furosemide (LASIX) 20 MG tablet Take 20 mg by mouth daily.     • meclizine (ANTIVERT) 25 MG tablet Take 25 mg by mouth daily as needed.     • DENOSumab (PROLIA) 60 MG/ML Solution Prefilled Syringe Inject 60 mg into the skin every 6 months.        No current facility-administered medications for this visit.       Social History:   Alcohol and Tobacco History:     Tobacco Use: Never            reports that she does not currently use drugs.    Review of systems:   Eye Problem(s):negative  ENT Problem(s):negative  Cardiovascular problem(s):negative aside from that mentioned in the HPI  Respiratory problem(s):negative  Gastro-intestinal problem(s):negative  Genito-urinary problem(s):negative  Musculoskeletal problem(s):negative  Integumentary problem(s):negative  Neurological problem(s):negative  Psychiatric problem(s):negative  Endocrine problem(s):negative  Hematologic and/or Lymphatic problem(s):negative     Physical exam:     Vitals:  Vitals:    05/29/24 1122   BP: 118/62   BP Location: LUE - Left upper extremity   Patient Position: Sitting   Pulse: 72   Temp: 96.3 °F (35.7 °C)   SpO2: 95%   Weight: 100.9 kg (222 lb 7.1 oz)   Height: 5' 2\" (1.575 m)   PainSc:  0       Constitutional: Appears no distress  HEENT: PERRL, atraumatic, normocephalic  Chest: Breath sounds clear bilaterally  CVS: S1-S2 present.  Regular rate and rhythm.  No lower extremity edema.  Abdomen: Normal bowel sounds  Extremities: No obvious defects  Skin: No tears or obvious defects  Neurologic: AAO x3, moving all extremities  Psych: Normal affect    Data:     CBC:   Lab  Results   Component Value Date    WBC 6.5 06/26/2023    WBC 7.6 10/29/2021    RBC 4.81 06/26/2023    RBC 4.82 10/29/2021    HGB 13.6 06/26/2023    HGB 12.7 10/29/2021    HCT 41.7 06/26/2023    HCT 40.4 10/29/2021    MCV 86.7 06/26/2023    MCV 83.8 10/29/2021     06/26/2023     10/29/2021     CMP:   Recent Labs   Lab 01/18/24  1152 06/26/23  0933 05/31/23  0426 05/30/23  0658   Sodium 138 140 140 140   Chloride 103 102 108 107   BUN 10 17 18 14   BUN/Creatinine Ratio 22  --   --   --    Potassium 4.4 4.1 3.7 3.7   Glucose 93 127* 143* 144*   Creatinine 0.45* 0.55 0.65 0.48*   GFR,ESTIMATE 99  --   --   --    CALCIUM 9.2  --   --   --    Calcium  --  9.9 8.7 9.1       FLP:   CHOLESTEROL (mg/dL)   Date Value   01/18/2024 172     HDL (mg/dL)   Date Value   01/18/2024 48 (L)     CHOL/HDL ((calc))   Date Value   01/18/2024 3.6     TRIGLYCERIDE (mg/dL)   Date Value   01/18/2024 139     CALCULATED LDL (mg/dL (calc))   Date Value   01/18/2024 100 (H)     TSH:    Lab Results   Component Value Date    TSH 1.593 05/29/2023    TSH 2.377 06/02/2016       ECG's:   Personally viewed and interpreted: NSR with PAC's    MANA 1/12/2023:   SUMMARY:     1. Left ventricle: The cavity size is below normal. Wall thickness is     increased. Systolic function is normal. The ejection fraction was measured     by visual estimation. The ejection fraction is 70%.  2. Left atrium: The atrium is dilated. There is no evidence of a thrombus in     the atrial cavity or appendage. There is spontaneous echo contrast     (\"smoke\"). Appendage properties: Emptying velocity is mildly reduced.  3. Procedure narrative: Cardioversion was performed. The patient was initially     in atrial fibrillation. 1 synchronized shock(s) with a maximum energy of     200J, biphasic were delivered. The rhythm converted to normal sinus rhythm.  IMPRESSIONS:   Successful cardioversion.    Assessment / Plan:     Assessment:     1. PAF:   - NVAF  - symptomatic  -  CHADSVASc = 5: on apixaban   - pradaxa previously caused dyspepsia  - she was waiting for recurrences and then had several 5/2023. She thus underwent PVI 6/26/2023.   - we'll maintain her on carvedilol and dabigatran   - doing well in follow up.     2. Moderate MR - stable    3. HTN: at goal    Ivabradine therapy: HR is 65bpm. She feels better on this so we'll maintain this without change.     Follow up in 6 months    Thank you for allowing me to participate in the care of Ms. Espitia. Please don't hesitate to contact me with any questions.     Corby Aceves MD  [facility]

## 2024-06-03 ENCOUNTER — RA CDI HCC (OUTPATIENT)
Dept: OTHER | Facility: HOSPITAL | Age: 64
End: 2024-06-03

## 2024-06-03 ENCOUNTER — APPOINTMENT (OUTPATIENT)
Dept: LAB | Facility: HOSPITAL | Age: 64
End: 2024-06-03
Payer: COMMERCIAL

## 2024-06-03 ENCOUNTER — TELEPHONE (OUTPATIENT)
Dept: NEPHROLOGY | Facility: CLINIC | Age: 64
End: 2024-06-03

## 2024-06-03 DIAGNOSIS — D69.6 THROMBOCYTOPENIA (HCC): Chronic | ICD-10-CM

## 2024-06-03 DIAGNOSIS — E87.1 HYPONATREMIA: ICD-10-CM

## 2024-06-03 DIAGNOSIS — E83.42 HYPOMAGNESEMIA: ICD-10-CM

## 2024-06-03 DIAGNOSIS — R74.01 TRANSAMINITIS: ICD-10-CM

## 2024-06-03 LAB
ALBUMIN SERPL BCP-MCNC: 3.9 G/DL (ref 3.5–5)
ALP SERPL-CCNC: 58 U/L (ref 34–104)
ALT SERPL W P-5'-P-CCNC: 36 U/L (ref 7–52)
ANION GAP SERPL CALCULATED.3IONS-SCNC: 6 MMOL/L (ref 4–13)
AST SERPL W P-5'-P-CCNC: 25 U/L (ref 13–39)
BASOPHILS # BLD AUTO: 0.11 THOUSANDS/ÂΜL (ref 0–0.1)
BASOPHILS NFR BLD AUTO: 2 % (ref 0–1)
BILIRUB SERPL-MCNC: 0.37 MG/DL (ref 0.2–1)
BUN SERPL-MCNC: 23 MG/DL (ref 5–25)
CALCIUM SERPL-MCNC: 9.3 MG/DL (ref 8.4–10.2)
CHLORIDE SERPL-SCNC: 103 MMOL/L (ref 96–108)
CO2 SERPL-SCNC: 27 MMOL/L (ref 21–32)
CREAT SERPL-MCNC: 1.25 MG/DL (ref 0.6–1.3)
EOSINOPHIL # BLD AUTO: 0.26 THOUSAND/ÂΜL (ref 0–0.61)
EOSINOPHIL NFR BLD AUTO: 4 % (ref 0–6)
ERYTHROCYTE [DISTWIDTH] IN BLOOD BY AUTOMATED COUNT: 13.7 % (ref 11.6–15.1)
GFR SERPL CREATININE-BSD FRML MDRD: 61 ML/MIN/1.73SQ M
GLUCOSE P FAST SERPL-MCNC: 106 MG/DL (ref 65–99)
HCT VFR BLD AUTO: 36.7 % (ref 36.5–49.3)
HGB BLD-MCNC: 12.1 G/DL (ref 12–17)
IMM GRANULOCYTES # BLD AUTO: 0.02 THOUSAND/UL (ref 0–0.2)
IMM GRANULOCYTES NFR BLD AUTO: 0 % (ref 0–2)
LYMPHOCYTES # BLD AUTO: 1.46 THOUSANDS/ÂΜL (ref 0.6–4.47)
LYMPHOCYTES NFR BLD AUTO: 23 % (ref 14–44)
MAGNESIUM SERPL-MCNC: 1.8 MG/DL (ref 1.9–2.7)
MCH RBC QN AUTO: 33.2 PG (ref 26.8–34.3)
MCHC RBC AUTO-ENTMCNC: 33 G/DL (ref 31.4–37.4)
MCV RBC AUTO: 101 FL (ref 82–98)
MONOCYTES # BLD AUTO: 0.97 THOUSAND/ÂΜL (ref 0.17–1.22)
MONOCYTES NFR BLD AUTO: 15 % (ref 4–12)
NEUTROPHILS # BLD AUTO: 3.51 THOUSANDS/ÂΜL (ref 1.85–7.62)
NEUTS SEG NFR BLD AUTO: 56 % (ref 43–75)
NRBC BLD AUTO-RTO: 0 /100 WBCS
OSMOLALITY UR: 570 MMOL/KG
PLATELET # BLD AUTO: 243 THOUSANDS/UL (ref 149–390)
PMV BLD AUTO: 11.5 FL (ref 8.9–12.7)
POTASSIUM SERPL-SCNC: 4.7 MMOL/L (ref 3.5–5.3)
PROT SERPL-MCNC: 7.2 G/DL (ref 6.4–8.4)
RBC # BLD AUTO: 3.65 MILLION/UL (ref 3.88–5.62)
SODIUM 24H UR-SCNC: 74 MOL/L
SODIUM SERPL-SCNC: 136 MMOL/L (ref 135–147)
URATE SERPL-MCNC: 6.5 MG/DL (ref 3.5–8.5)
WBC # BLD AUTO: 6.33 THOUSAND/UL (ref 4.31–10.16)

## 2024-06-03 PROCEDURE — 80053 COMPREHEN METABOLIC PANEL: CPT

## 2024-06-03 PROCEDURE — 83735 ASSAY OF MAGNESIUM: CPT

## 2024-06-03 PROCEDURE — 36415 COLL VENOUS BLD VENIPUNCTURE: CPT

## 2024-06-03 PROCEDURE — 85025 COMPLETE CBC W/AUTO DIFF WBC: CPT

## 2024-06-03 NOTE — TELEPHONE ENCOUNTER
Attempted to reach pt to offer sooner appt with Dr. Dugan 6/6 - patient answered and did not speak

## 2024-06-04 ENCOUNTER — OFFICE VISIT (OUTPATIENT)
Age: 64
End: 2024-06-04

## 2024-06-04 DIAGNOSIS — I48.0 PAROXYSMAL ATRIAL FIBRILLATION WITH RAPID VENTRICULAR RESPONSE (HCC): ICD-10-CM

## 2024-06-04 DIAGNOSIS — F10.10 ALCOHOL ABUSE: Primary | Chronic | ICD-10-CM

## 2024-06-04 DIAGNOSIS — R79.89 ELEVATED BRAIN NATRIURETIC PEPTIDE (BNP) LEVEL: ICD-10-CM

## 2024-06-04 DIAGNOSIS — R79.89 ELEVATED SERUM CREATININE: ICD-10-CM

## 2024-06-04 DIAGNOSIS — I95.9 HYPOTENSION, UNSPECIFIED HYPOTENSION TYPE: ICD-10-CM

## 2024-06-04 PROCEDURE — G2211 COMPLEX E/M VISIT ADD ON: HCPCS | Performed by: FAMILY MEDICINE

## 2024-06-04 PROCEDURE — 99213 OFFICE O/P EST LOW 20 MIN: CPT | Performed by: FAMILY MEDICINE

## 2024-06-04 RX ORDER — NALTREXONE HYDROCHLORIDE 50 MG/1
50 TABLET, FILM COATED ORAL DAILY
Qty: 30 TABLET | Refills: 2 | Status: SHIPPED | OUTPATIENT
Start: 2024-06-04

## 2024-06-04 RX ORDER — METOPROLOL TARTRATE 50 MG/1
50 TABLET, FILM COATED ORAL EVERY 12 HOURS SCHEDULED
Qty: 60 TABLET | Refills: 0 | Status: SHIPPED | OUTPATIENT
Start: 2024-06-04

## 2024-06-04 NOTE — PROGRESS NOTES
[facility] Office visit    Assessment/Plan:     1. Alcohol abuse  Denies etoh consumption since discharge, has yet to reestablish with rehab program, but plans to do outpatient rehab from a list of facilities that he has plans to call    -     Vitamin B12/Folate, Serum Panel; Future  -     naltrexone (REVIA) 50 mg tablet; Take 1 tablet (50 mg total) by mouth daily  - Complex Care Referral    2. Hypotension, unspecified hypotension type  BP 90/52, map 65, asymptomatic, lisinopril 20 mg discontinued  3. Elevated Serum Creatinine  Recent cmp shows eGFR drop from 80 to 60  - nephro fu scheduled  - lisinopril D/Jluis  - repeat CMP  4. Elevated BNP  proBNP 2839 during recent hospitalization, advised to follow up with outpatient cardiologist Carley Hernandez     Return for AWV.    90/52       Subjective:   LITO Partida is a 64 y.o. male with a history of alcohol abuse who presented for an ED follow up for intoxication on 5/23, he was started on a librium protocol and transferred to [facility] who then sent him to an [facility] ED for chest pain where he was admitted, hospital course pasted below.  Cardiology         Hospital Course 5/27-5/28: Patient is a 64 y.o. male with hypertension, hyperlipidemia, COPD, CAD status post CABG 2004 & PCI 2014, alcohol abuse, atrial fibrillation who presented from [facility] following admission for ETOH detoxification with complaint of chest pain and was admitted for observation. His labs on admission were significant for mild troponemia, hypomagnesemia and proBNP of 2839. His admission EKG was normal and he was continued on telemetry which was significant only for episodes of sinus tachycardia. He was noted to have an elevated D-dimer and underwent CTA chest which was significant for posterior right 10th and 11th rib fractures noted to be possibly subacute. Cardiology was consulted, chest pain was deemed to be atypical with no need for further inpatient management, he  "is encouraged to follow-up with his cardiologist. Regarding his history of alcohol abuse, patient was commenced on CIWA as needed's and received lorazepam x 2 doses and diazepam x 1 dose. His other medications were continued. On day 2 of admission, he was noted to have elevated creatinine, on chart review it was noted that his baseline was about 1-1.3. He is stable for discharge at this time, he will need to follow-up with PCP, nephrologist and cardiologist. He would benefit from admission to [facility] for continued detoxification. He will need to have a repeat BMP in 48 hours to assess his kidney function as he is still on ARB medication.     Pt states that his plan is to go back to [facility], then to complete a 16 week outpatient program through [facility]. Pt states he would like to return to [facility] as soon as possible.      Review of Systems   Constitutional:  Negative for chills and fever.   Respiratory:  Negative for shortness of breath.    Cardiovascular:  Negative for chest pain.   Gastrointestinal:  Negative for diarrhea, nausea and vomiting.   Neurological:  Negative for dizziness, tremors and headaches.   Psychiatric/Behavioral:  Negative for confusion and hallucinations.         Objective:     BP (!) 89/46 (BP Location: Left arm, Patient Position: Sitting)   Pulse 72   Resp 17   Ht 6' 2\" (1.88 m)   Wt 91.2 kg (201 lb)   SpO2 97%   BMI 25.81 kg/m²      Physical Exam  Constitutional:       General: He is not in acute distress.     Appearance: He is obese. He is not ill-appearing, toxic-appearing or diaphoretic.   HENT:      Head: Normocephalic.   Cardiovascular:      Rate and Rhythm: Normal rate and regular rhythm.      Heart sounds: Normal heart sounds. No murmur heard.  Pulmonary:      Effort: Pulmonary effort is normal. No respiratory distress.      Breath sounds: Normal breath sounds.   Neurological:      Mental Status: He is alert and oriented to person, place, and time.      Comments: No " tremor or asterixis   Psychiatric:         Mood and Affect: Mood normal.         Behavior: Behavior normal.          ** Please Note: This note has been constructed using a voice recognition system **     Elliott Pablo MD  06/06/24  9:20 PM

## 2024-06-06 VITALS
DIASTOLIC BLOOD PRESSURE: 52 MMHG | HEART RATE: 72 BPM | OXYGEN SATURATION: 97 % | BODY MASS INDEX: 25.8 KG/M2 | RESPIRATION RATE: 17 BRPM | SYSTOLIC BLOOD PRESSURE: 90 MMHG | HEIGHT: 74 IN | WEIGHT: 201 LBS

## 2024-06-11 ENCOUNTER — TELEPHONE (OUTPATIENT)
Dept: NEPHROLOGY | Facility: CLINIC | Age: 64
End: 2024-06-11

## 2024-06-15 ENCOUNTER — HOSPITAL ENCOUNTER (EMERGENCY)
Facility: HOSPITAL | Age: 64
Discharge: HOME | End: 2024-06-16
Attending: EMERGENCY MEDICINE | Admitting: EMERGENCY MEDICINE
Payer: COMMERCIAL

## 2024-06-15 DIAGNOSIS — F10.929 ALCOHOL INTOXICATION (HCC): Primary | ICD-10-CM

## 2024-06-15 PROCEDURE — 99284 EMERGENCY DEPT VISIT MOD MDM: CPT

## 2024-06-15 PROCEDURE — 99283 EMERGENCY DEPT VISIT LOW MDM: CPT | Performed by: EMERGENCY MEDICINE

## 2024-06-16 VITALS
DIASTOLIC BLOOD PRESSURE: 85 MMHG | SYSTOLIC BLOOD PRESSURE: 174 MMHG | RESPIRATION RATE: 17 BRPM | OXYGEN SATURATION: 98 % | TEMPERATURE: 98.7 F | HEART RATE: 100 BPM

## 2024-06-16 NOTE — ED NOTES
Pt found multiple times walking the dawson. Patient assisted back to room, security called due to patient showing signs of escalation.      Gwen Albrecht RN  06/15/24 [id number]

## 2024-06-16 NOTE — ED PROVIDER NOTES
"Final Diagnosis:  No diagnosis found.    Chief Complaint   Patient presents with    Alcohol Intoxication     Pt sent in after someone called 911 due to patient \"being slow\" and altered mental status. Pt told EMS he drank a pint of vodka tonight. Pt denies any pain       HPI  Pt pres intoxicated. He's up ambulating around w/ signs of intox no new signs of trauma. He has no pain just wants to go home. Has no ride. Doesn't want us to call son. Reports drank a pint of vodka. Passerby saw him slow and altered so called.     Pt denies other intoxicants and denies pain     EMS additionally reports:     - Previous charting underwent limited review with attention to last ED visits, labs, ekgs, and prior imaging.  Chart review reveals :     Appointment on 06/03/2024   Component Date Value Ref Range Status    Sodium 06/03/2024 136  135 - 147 mmol/L Final    Potassium 06/03/2024 4.7  3.5 - 5.3 mmol/L Final    Chloride 06/03/2024 103  96 - 108 mmol/L Final    CO2 06/03/2024 27  21 - 32 mmol/L Final    ANION GAP 06/03/2024 6  4 - 13 mmol/L Final    BUN 06/03/2024 23  5 - 25 mg/dL Final    Creatinine 06/03/2024 1.25  0.60 - 1.30 mg/dL Final    Standardized to IDMS reference method    Glucose, Fasting 06/03/2024 106 (H)  65 - 99 mg/dL Final    Calcium 06/03/2024 9.3  8.4 - 10.2 mg/dL Final    AST 06/03/2024 25  13 - 39 U/L Final    ALT 06/03/2024 36  7 - 52 U/L Final    Specimen collection should occur prior to Sulfasalazine administration due to the potential for falsely depressed results.     Alkaline Phosphatase 06/03/2024 58  34 - 104 U/L Final    Total Protein 06/03/2024 7.2  6.4 - 8.4 g/dL Final    Albumin 06/03/2024 3.9  3.5 - 5.0 g/dL Final    Total Bilirubin 06/03/2024 0.37  0.20 - 1.00 mg/dL Final    Use of this assay is not recommended for patients undergoing treatment with eltrombopag due to the potential for falsely elevated results.  N-acetyl-p-benzoquinone imine (metabolite of Acetaminophen) will generate erroneously " low results in samples for patients that have taken an overdose of Acetaminophen.    eGFR 06/03/2024 61  ml/min/1.73sq m Final    WBC 06/03/2024 6.33  4.31 - 10.16 Thousand/uL Final    RBC 06/03/2024 3.65 (L)  3.88 - 5.62 Million/uL Final    Hemoglobin 06/03/2024 12.1  12.0 - 17.0 g/dL Final    Hematocrit 06/03/2024 36.7  36.5 - 49.3 % Final    MCV 06/03/2024 101 (H)  82 - 98 fL Final    MCH 06/03/2024 33.2  26.8 - 34.3 pg Final    MCHC 06/03/2024 33.0  31.4 - 37.4 g/dL Final    RDW 06/03/2024 13.7  11.6 - 15.1 % Final    MPV 06/03/2024 11.5  8.9 - 12.7 fL Final    Platelets 06/03/2024 243  149 - 390 Thousands/uL Final    nRBC 06/03/2024 0  /100 WBCs Final    Segmented % 06/03/2024 56  43 - 75 % Final    Immature Grans % 06/03/2024 0  0 - 2 % Final    Lymphocytes % 06/03/2024 23  14 - 44 % Final    Monocytes % 06/03/2024 15 (H)  4 - 12 % Final    Eosinophils Relative 06/03/2024 4  0 - 6 % Final    Basophils Relative 06/03/2024 2 (H)  0 - 1 % Final    Absolute Neutrophils 06/03/2024 3.51  1.85 - 7.62 Thousands/µL Final    Absolute Immature Grans 06/03/2024 0.02  0.00 - 0.20 Thousand/uL Final    Absolute Lymphocytes 06/03/2024 1.46  0.60 - 4.47 Thousands/µL Final    Absolute Monocytes 06/03/2024 0.97  0.17 - 1.22 Thousand/µL Final    Eosinophils Absolute 06/03/2024 0.26  0.00 - 0.61 Thousand/µL Final    Basophils Absolute 06/03/2024 0.11 (H)  0.00 - 0.10 Thousands/µL Final    Magnesium 06/03/2024 1.8 (L)  1.9 - 2.7 mg/dL Final       - No language barrier.   - History obtained from patient    - Discuss patient's care, with patient permission or by chart review, with      PMH:   has a past medical history of Acute on chronic kidney failure  (HCC), Alcohol withdrawal (HCC) (06/07/2019), Atrial fibrillation (HCC), Cancer (HCC), Cardiac disease, COPD (chronic obstructive pulmonary disease) (HCC), Coronary artery disease, ETOH abuse, Heart failure (HCC), History of heart surgery, heart artery stent, Hyperlipidemia,  Hypertension, Hypovolemic shock (HCC) (12/22/2019), Lumbar spondylitis (HCC) (10/13/2022), Nasal bone fracture (10/10/2022), Prostate CA (HCC), S/P CABG x 3, and Sleep apnea.    PSH:   has a past surgical history that includes Tonsillectomy; Coronary artery bypass graft (2004); pr arthrd ant interbody min dsc crv below c2 (N/A, 12/16/2020); and Cardiac catheterization.     Social History:  Tobacco Use: High Risk (6/15/2024)    Patient History     Smoking Tobacco Use: Every Day     Smokeless Tobacco Use: Never     Passive Exposure: Not on file     Alcohol Use: Alcohol Misuse (10/17/2022)    AUDIT-C     Frequency of Alcohol Consumption: 4 or more times a week     Average Number of Drinks: 10 or more     Frequency of Binge Drinking: Daily or almost daily     No illicit use       ROS:  Pertinent positives/negatives: .     Some ROS may be present in the HPI and would take precedent over these standard questions asked below.   Review of Systems   Unable to perform ROS: Other      Intox       PE:     Physical exam highlights:   Physical Exam       Vitals:    06/15/24 2153   BP: 161/94   BP Location: Right arm   Pulse: 97   Resp: 18   Temp: 97.6 °F (36.4 °C)   TempSrc: Probe   SpO2: 93%     Vitals reviewed by me.   Nursing note reviewed  Chaperone present for all sensitive exam.  Const: No acute distress. Alert. Nontoxic. Not diaphoretic.  itnoxicated  HEENT: External ears normal. No protrusion drainage swelling. Nose normal. No drainage/traumatic deformity. MM. Mouth with baseline/symmetric movement. No trismus.   Eyes: No squinting. No icterus. No tearing/swelling/drainage. Tracks through the room with normal EOM.   Neck: ROM normal. No rigidity. No meningismus.  Cards: Rate as per vitals Compared to monitor sinus unless documented. Regular Well perfused.  Pulm: Effort and excursion normal. No distress. No audible wheezing/no stridor. Normal resp rate without retraction or change in work of breathing.  Abd: No distension  beyond baseline. No fluctuant wave. Patient without peritoneal pain with shifting/bumping the bed.   MSK: ROM normal baseline. No deformity. No contractures from baseline.   Skin: No new rashes visible. Well perfused. No wounds visualized on exposed skin  Neuro: Nonfocal. Baseline. CN grossly intact. Moving all four with coordination.   Psych: only partly cooperative, verbally abusive, intoxicated         A:  - Nursing note reviewed.    Ddx and MDM  Considered diagnoses    Motniro for sobriety    Reassess symptoms and willingness for rehab        Dispo decision       My conversation with consultant reveals:        Decision rules:                           My read of the XR/CT scan reveals:    No orders to display       No orders of the defined types were placed in this encounter.    Labs Reviewed - No data to display    *Each of these labs was reviewed. Particular standout labs will be noted in the ED Course above     Final Diagnosis:  No diagnosis found.      P:  - hospital tx includes   Medications - No data to display      - disposition  ED Disposition       None          Follow-up Information    None         - patient will call their PCP to let them know they were in the emergency department. We discuss return precautions and patient is agreeable with plan and aformentioned disposition.       - additional treatment intended, if consistent with primary provider:  - patient to follow with :      Patient's Medications   Discharge Prescriptions    No medications on file     No discharge procedures on file.  Prior to Admission Medications   Prescriptions Last Dose Informant Patient Reported? Taking?   Blood Glucose Monitoring Suppl (ONE TOUCH ULTRA MINI) w/Device KIT  Self Yes No   Sig: Use as directed   Patient not taking: Reported on 3/25/2024   Blood Pressure Monitoring (Adult Blood Pressure Cuff Lg) KIT   No No   Sig: Use in the morning   Patient not taking: Reported on 3/25/2024   Breo Ellipta 200-25 MCG/ACT  inhaler  Self No No   Sig: Inhale 1 puff daily Rinse mouth after use.   Patient not taking: Reported on 3/25/2024   ONETOUCH DELICA LANCETS FINE MISC  Self Yes No   Sig: 3 (three) times a day Test   Thiamine HCl (vitamin B-1) 100 MG TABS  Self No No   Sig: TAKE 1 TABLET DAILY   Patient not taking: Reported on 3/25/2024   albuterol (Ventolin HFA) 90 mcg/act inhaler  Self No No   Sig: Inhale 2 puffs every 4 (four) hours as needed for wheezing   Patient not taking: Reported on 3/25/2024   aluminum-magnesium hydroxide-simethicone (MAALOX) 3442-5238-966 mg/30 mL suspension   No No   Sig: Take 30 mL by mouth every 4 (four) hours as needed for indigestion or heartburn   Patient not taking: Reported on 3/25/2024   apixaban (ELIQUIS) 5 mg   No No   Sig: Take 1 tablet (5 mg total) by mouth 2 (two) times a day Do not start before January 31, 2024.   aspirin (ECOTRIN LOW STRENGTH) 81 mg EC tablet  Self Yes No   Sig: Take 81 mg by mouth daily   ferrous sulfate 325 (65 Fe) mg tablet  Self No No   Sig: Take 1 tablet (325 mg total) by mouth daily with breakfast   folic acid (FOLVITE) 1 mg tablet   No No   Sig: TAKE 1 TABLET DAILY   gabapentin (NEURONTIN) 300 mg capsule   No No   Sig: TAKE ONE CAPSULE THREE TIMES DAILY   magnesium Oxide (MAG-OX) 400 mg TABS   No No   Sig: Take 1 tablet (400 mg total) by mouth 2 (two) times a day   metFORMIN (GLUCOPHAGE) 500 mg tablet   No No   Sig: TAKE 1 TABLET DAILY WITH BREAKFAST   metoprolol tartrate (LOPRESSOR) 50 mg tablet   No No   Sig: Take 1 tablet (50 mg total) by mouth every 12 (twelve) hours   naltrexone (REVIA) 50 mg tablet   No No   Sig: Take 1 tablet (50 mg total) by mouth daily   nicotine (NICODERM CQ) 21 mg/24 hr TD 24 hr patch  Self No No   Sig: Place 1 patch on the skin over 24 hours daily Do not start before December 4, 2023.   Patient not taking: Reported on 3/25/2024   pantoprazole (PROTONIX) 40 mg tablet   No No   Sig: TAKE 1 TABLET TWO TIMES A DAY   pravastatin (PRAVACHOL) 40  "mg tablet  Self No No   Sig: TAKE 1 TABLET DAILY WITH DINNER   ranolazine (RANEXA) 500 mg 12 hr tablet   No No   Sig: TAKE 1 TABLET EVERY 12 HOURS   senna-docusate sodium (SENOKOT S) 8.6-50 mg per tablet   No No   Sig: Take 1 tablet by mouth daily as needed for constipation   Patient not taking: Reported on 3/25/2024   tamsulosin (FLOMAX) 0.4 mg   No No   Sig: TAKE 1 CAPSULE DAILY   varenicline (CHANTIX) 1 mg tablet   No No   Sig: TAKE 1 TABLET 2 TIMES A DAY   Patient not taking: Reported on 3/25/2024      Facility-Administered Medications: None       Portions of the record may have been created with voice recognition software. Occasional wrong word or \"sound a like\" substitutions may have occurred due to the inherent limitations of voice recognition software. Read the chart carefully and recognize, using context, where substitutions have occurred.    Electronically signed by:  MD Fito Franco MD  06/17/24 0419    "

## 2024-06-16 NOTE — ED NOTES
Round trip called for pt.    Pt in waiting room waiting for       Marietta Edwards RN  06/16/24 [id number]

## 2024-06-17 ENCOUNTER — HOSPITAL ENCOUNTER (EMERGENCY)
Facility: HOSPITAL | Age: 64
Discharge: HOME | End: 2024-06-18
Attending: EMERGENCY MEDICINE | Admitting: EMERGENCY MEDICINE
Payer: COMMERCIAL

## 2024-06-17 ENCOUNTER — TELEPHONE (OUTPATIENT)
Age: 64
End: 2024-06-17

## 2024-06-17 DIAGNOSIS — F10.929 ALCOHOL INTOXICATION (HCC): Primary | ICD-10-CM

## 2024-06-17 PROCEDURE — 99284 EMERGENCY DEPT VISIT MOD MDM: CPT

## 2024-06-17 NOTE — TELEPHONE ENCOUNTER
Contacted Patient regarding recent ED Visit dated 6/15/24.    Advised patient to contact the office with new or worsen symptoms as we have On Call Provider 24 hrs. Provided office hours and phone. Patient does not wish to schedule an appointment at this time.        [facility]  CC:Alcohol Intoxication  DX; Alcohol Intoxication   Time: 9:49pm   Last OV:6/4/24

## 2024-06-18 ENCOUNTER — TELEPHONE (OUTPATIENT)
Age: 64
End: 2024-06-18

## 2024-06-18 VITALS
RESPIRATION RATE: 12 BRPM | SYSTOLIC BLOOD PRESSURE: 193 MMHG | HEART RATE: 128 BPM | OXYGEN SATURATION: 94 % | TEMPERATURE: 98 F | DIASTOLIC BLOOD PRESSURE: 105 MMHG

## 2024-06-18 PROCEDURE — 99284 EMERGENCY DEPT VISIT MOD MDM: CPT | Performed by: EMERGENCY MEDICINE

## 2024-06-18 NOTE — TELEPHONE ENCOUNTER
Attempted Contacting Patient regarding recent ED Visit on 6/17/24.    Left message asking Patient to call the office to schedule Follow up appointment. Provided office hours and phone number.         [facility]  CC: Alcohol Intoxication   DX; Alcohol Intoxication  Time: 11:32am   Last OV:6/4/24

## 2024-06-18 NOTE — ED PROVIDER NOTES
History  Chief Complaint   Patient presents with    Alcohol Intoxication     Pt barbara from home, reports drinking vodka tonight, called 911 initially but could not remember why he called, pt able to answer questions in triage.      64-year-old male well-known to the ED presents multiple times for alcohol intoxication presents today with the same he drank before he came in denies any psychiatric complaints of suicidal or homicidal ideation no alcohol withdrawal symptoms noted no medical complaints.      History provided by:  Patient   used: No        Prior to Admission Medications   Prescriptions Last Dose Informant Patient Reported? Taking?   Blood Glucose Monitoring Suppl (ONE TOUCH ULTRA MINI) w/Device KIT  Self Yes No   Sig: Use as directed   Patient not taking: Reported on 3/25/2024   Blood Pressure Monitoring (Adult Blood Pressure Cuff Lg) KIT   No No   Sig: Use in the morning   Patient not taking: Reported on 3/25/2024   Breo Ellipta 200-25 MCG/ACT inhaler  Self No No   Sig: Inhale 1 puff daily Rinse mouth after use.   Patient not taking: Reported on 3/25/2024   ONETOUCH DELICA LANCETS FINE MISC  Self Yes No   Sig: 3 (three) times a day Test   Thiamine HCl (vitamin B-1) 100 MG TABS  Self No No   Sig: TAKE 1 TABLET DAILY   Patient not taking: Reported on 3/25/2024   albuterol (Ventolin HFA) 90 mcg/act inhaler  Self No No   Sig: Inhale 2 puffs every 4 (four) hours as needed for wheezing   Patient not taking: Reported on 3/25/2024   aluminum-magnesium hydroxide-simethicone (MAALOX) 4521-7641-804 mg/30 mL suspension   No No   Sig: Take 30 mL by mouth every 4 (four) hours as needed for indigestion or heartburn   Patient not taking: Reported on 3/25/2024   apixaban (ELIQUIS) 5 mg   No No   Sig: Take 1 tablet (5 mg total) by mouth 2 (two) times a day Do not start before January 31, 2024.   aspirin (ECOTRIN LOW STRENGTH) 81 mg EC tablet  Self Yes No   Sig: Take 81 mg by mouth daily   ferrous sulfate  325 (65 Fe) mg tablet  Self No No   Sig: Take 1 tablet (325 mg total) by mouth daily with breakfast   folic acid (FOLVITE) 1 mg tablet   No No   Sig: TAKE 1 TABLET DAILY   gabapentin (NEURONTIN) 300 mg capsule   No No   Sig: TAKE ONE CAPSULE THREE TIMES DAILY   magnesium Oxide (MAG-OX) 400 mg TABS   No No   Sig: Take 1 tablet (400 mg total) by mouth 2 (two) times a day   metFORMIN (GLUCOPHAGE) 500 mg tablet   No No   Sig: TAKE 1 TABLET DAILY WITH BREAKFAST   metoprolol tartrate (LOPRESSOR) 50 mg tablet   No No   Sig: Take 1 tablet (50 mg total) by mouth every 12 (twelve) hours   naltrexone (REVIA) 50 mg tablet   No No   Sig: Take 1 tablet (50 mg total) by mouth daily   nicotine (NICODERM CQ) 21 mg/24 hr TD 24 hr patch  Self No No   Sig: Place 1 patch on the skin over 24 hours daily Do not start before December 4, 2023.   Patient not taking: Reported on 3/25/2024   pantoprazole (PROTONIX) 40 mg tablet   No No   Sig: TAKE 1 TABLET TWO TIMES A DAY   pravastatin (PRAVACHOL) 40 mg tablet  Self No No   Sig: TAKE 1 TABLET DAILY WITH DINNER   ranolazine (RANEXA) 500 mg 12 hr tablet   No No   Sig: TAKE 1 TABLET EVERY 12 HOURS   senna-docusate sodium (SENOKOT S) 8.6-50 mg per tablet   No No   Sig: Take 1 tablet by mouth daily as needed for constipation   Patient not taking: Reported on 3/25/2024   tamsulosin (FLOMAX) 0.4 mg   No No   Sig: TAKE 1 CAPSULE DAILY   varenicline (CHANTIX) 1 mg tablet   No No   Sig: TAKE 1 TABLET 2 TIMES A DAY   Patient not taking: Reported on 3/25/2024      Facility-Administered Medications: None       Past Medical History:   Diagnosis Date    Acute on chronic kidney failure  (HCC)     Alcohol withdrawal (HCC) 06/07/2019    Atrial fibrillation (HCC)     Cancer (HCC)     prostate ca,had radiation    Cardiac disease     stents,then triple bypass    COPD (chronic obstructive pulmonary disease) (HCC)     Coronary artery disease     ETOH abuse     Heart failure (HCC)     History of heart surgery      says triple bypass [facility]    Hx of heart artery stent     2014    Hyperlipidemia     Hypertension     Hypovolemic shock ([facility]) 12/22/2019    Lumbar spondylitis ([facility]) 10/13/2022    Nasal bone fracture 10/10/2022    Prostate CA ([facility])     S/P CABG x 3     2004    Sleep apnea        Past Surgical History:   Procedure Laterality Date    CARDIAC CATHETERIZATION      2 stents    CORONARY ARTERY BYPASS GRAFT  2004    [place] ARTHRD ANT INTERBODY MIN DSC CRV BELOW C2 N/A 12/16/2020    Procedure: Anterior cervical discectomy with fusion C4-C7; Posterior cervical decompression and fusion C2-T2;  Surgeon: David Rowell MD;  Location: [facility];  Service: Neurosurgery    TONSILLECTOMY         Family History   Problem Relation Age of Onset    Diabetes Mother     Uterine cancer Mother     COPD Father     Hypertension Father      I have reviewed and agree with the history as documented.    E-Cigarette/Vaping    E-Cigarette Use Never User      E-Cigarette/Vaping Substances    Nicotine Yes     THC No     CBD No     Flavoring No     Other No     Unknown No      Social History     Tobacco Use    Smoking status: Every Day     Current packs/day: 1.50     Average packs/day: 1.5 packs/day for 40.0 years (60.0 ttl pk-yrs)     Types: Cigarettes    Smokeless tobacco: Never   Vaping Use    Vaping status: Never Used   Substance Use Topics    Alcohol use: Yes     Alcohol/week: 4.0 standard drinks of alcohol     Types: 4 Standard drinks or equivalent per week     Comment: quart of vodka daily    Drug use: No       Review of Systems   Constitutional: Negative.    HENT: Negative.     Eyes: Negative.    Respiratory: Negative.     Cardiovascular: Negative.    Gastrointestinal: Negative.    Endocrine: Negative.    Genitourinary: Negative.    Musculoskeletal: Negative.    Skin: Negative.    Allergic/Immunologic: Negative.    Neurological: Negative.    Hematological: Negative.    Psychiatric/Behavioral: Negative.     All other systems reviewed and  are negative.      Physical Exam  Physical Exam  Vitals and nursing note reviewed.   Constitutional:       Appearance: Normal appearance.   HENT:      Head: Normocephalic and atraumatic.      Nose: Nose normal.      Mouth/Throat:      Mouth: Mucous membranes are moist.   Eyes:      Extraocular Movements: Extraocular movements intact.      Pupils: Pupils are equal, round, and reactive to light.   Cardiovascular:      Rate and Rhythm: Normal rate and regular rhythm.   Pulmonary:      Effort: Pulmonary effort is normal.      Breath sounds: Normal breath sounds.   Abdominal:      General: Abdomen is flat. Bowel sounds are normal.      Palpations: Abdomen is soft.   Musculoskeletal:         General: Normal range of motion.      Cervical back: Normal range of motion and neck supple.   Skin:     General: Skin is warm.      Capillary Refill: Capillary refill takes less than 2 seconds.   Neurological:      General: No focal deficit present.      Mental Status: He is alert and oriented to person, place, and time. Mental status is at baseline.   Psychiatric:         Mood and Affect: Mood normal.         Thought Content: Thought content normal.         Vital Signs  ED Triage Vitals [06/17/24 2338]   Temperature Pulse Respirations Blood Pressure SpO2   98.1 °F (36.7 °C) (!) 110 18 (!) 217/100 91 %      Temp Source Heart Rate Source Patient Position - Orthostatic VS BP Location FiO2 (%)   Temporal Monitor Lying Left arm --      Pain Score       --           Vitals:    06/17/24 2338 06/18/24 0655   BP: (!) 217/100 (!) 193/105   Pulse: (!) 110 (!) 128   Patient Position - Orthostatic VS: Lying Lying         Visual Acuity      ED Medications  Medications - No data to display    Diagnostic Studies  Results Reviewed       None                   No orders to display              Procedures  Procedures         ED Course                                             Medical Decision Making  Patient appears intoxicated alcohol.  Patient to  remain overnight in the ED to be reassessed in the morning.  Patient clinically sober ambulated without difficulty discharged    Problems Addressed:  Alcohol intoxication (HCC): acute illness or injury    Amount and/or Complexity of Data Reviewed  External Data Reviewed: notes.             Disposition  Final diagnoses:   Alcohol intoxication (HCC)     Time reflects when diagnosis was documented in both MDM as applicable and the Disposition within this note       Time User Action Codes Description Comment    6/18/2024  6:23 AM ChanoBretoi Humphries [F10.929] Alcohol intoxication (HCC)           ED Disposition       ED Disposition   Discharge    Condition   Stable    Date/Time   Tue Jun 18, 2024  6:51 AM    Comment   Gregg Partida discharge to home/self care.                   Follow-up Information       Follow up With Specialties Details Why Contact Info Additional Information    Critical access hospital Emergency Department Emergency Medicine  If symptoms worsen [address] [facility] [address]  [phone] [facility] Emergency Department, [address], [place], [address]            Discharge Medication List as of 6/18/2024  7:04 AM        CONTINUE these medications which have NOT CHANGED    Details   albuterol (Ventolin HFA) 90 mcg/act inhaler Inhale 2 puffs every 4 (four) hours as needed for wheezing, Starting Tue 1/17/2023, Normal      aluminum-magnesium hydroxide-simethicone (MAALOX) 9724-8467-257 mg/30 mL suspension Take 30 mL by mouth every 4 (four) hours as needed for indigestion or heartburn, Starting Wed 2/14/2024, Normal      apixaban (ELIQUIS) 5 mg Take 1 tablet (5 mg total) by mouth 2 (two) times a day Do not start before January 31, 2024., Starting Wed 1/31/2024, Normal      aspirin (ECOTRIN LOW STRENGTH) 81 mg EC tablet Take 81 mg by mouth daily, Historical Med      Blood Glucose Monitoring Suppl (ONE TOUCH ULTRA MINI) w/Device KIT Use as directed,  Starting Thu 5/16/2019, Historical Med      Blood Pressure Monitoring (Adult Blood Pressure Cuff Lg) KIT Use in the morning, Starting Thu 12/14/2023, Normal      Breo Ellipta 200-25 MCG/ACT inhaler Inhale 1 puff daily Rinse mouth after use., Starting Fri 6/9/2023, Normal      ferrous sulfate 325 (65 Fe) mg tablet Take 1 tablet (325 mg total) by mouth daily with breakfast, Starting Fri 6/2/2023, Normal      folic acid (FOLVITE) 1 mg tablet TAKE 1 TABLET DAILY, Normal      gabapentin (NEURONTIN) 300 mg capsule TAKE ONE CAPSULE THREE TIMES DAILY, Normal      magnesium Oxide (MAG-OX) 400 mg TABS Take 1 tablet (400 mg total) by mouth 2 (two) times a day, Starting Wed 1/31/2024, Normal      metFORMIN (GLUCOPHAGE) 500 mg tablet TAKE 1 TABLET DAILY WITH BREAKFAST, Starting Sun 4/7/2024, Normal      metoprolol tartrate (LOPRESSOR) 50 mg tablet Take 1 tablet (50 mg total) by mouth every 12 (twelve) hours, Starting Tue 6/4/2024, Normal      naltrexone (REVIA) 50 mg tablet Take 1 tablet (50 mg total) by mouth daily, Starting Tue 6/4/2024, Normal      nicotine (NICODERM CQ) 21 mg/24 hr TD 24 hr patch Place 1 patch on the skin over 24 hours daily Do not start before December 4, 2023., Starting Mon 12/4/2023, Normal      ONETOUCH DELICA LANCETS FINE MISC 3 (three) times a day Test, Starting Thu 5/16/2019, Historical Med      pantoprazole (PROTONIX) 40 mg tablet TAKE 1 TABLET TWO TIMES A DAY, Normal      pravastatin (PRAVACHOL) 40 mg tablet TAKE 1 TABLET DAILY WITH DINNER, Normal      ranolazine (RANEXA) 500 mg 12 hr tablet TAKE 1 TABLET EVERY 12 HOURS, Normal      senna-docusate sodium (SENOKOT S) 8.6-50 mg per tablet Take 1 tablet by mouth daily as needed for constipation, Starting Sat 1/27/2024, No Print      tamsulosin (FLOMAX) 0.4 mg TAKE 1 CAPSULE DAILY, Normal      Thiamine HCl (vitamin B-1) 100 MG TABS TAKE 1 TABLET DAILY, Normal      varenicline (CHANTIX) 1 mg tablet TAKE 1 TABLET 2 TIMES A DAY, Starting Tue 12/12/2023,  Normal             No discharge procedures on file.    PDMP Review         Value Time User    PDMP Reviewed  Yes 1/24/2024  9:23 AM Tyrone Freire DO            ED Provider  Electronically Signed by             Trish Madden,   06/18/24 0623       Trish Madden,   06/18/24 0752

## 2024-06-18 NOTE — ED NOTES
Pt ambulate dawson with this RN, gait steady.  Made provider aware     Michelle Woody RN  06/18/24 0701

## 2024-06-19 ENCOUNTER — PATIENT OUTREACH (OUTPATIENT)
Age: 64
End: 2024-06-19

## 2024-06-19 ENCOUNTER — HOSPITAL ENCOUNTER (EMERGENCY)
Facility: HOSPITAL | Age: 64
Discharge: HOME | End: 2024-06-20
Attending: EMERGENCY MEDICINE
Payer: COMMERCIAL

## 2024-06-19 VITALS
SYSTOLIC BLOOD PRESSURE: 148 MMHG | RESPIRATION RATE: 18 BRPM | TEMPERATURE: 97.9 F | HEART RATE: 91 BPM | OXYGEN SATURATION: 96 % | DIASTOLIC BLOOD PRESSURE: 75 MMHG

## 2024-06-19 DIAGNOSIS — F10.929 ALCOHOL INTOXICATION (HCC): Primary | ICD-10-CM

## 2024-06-19 PROCEDURE — 99283 EMERGENCY DEPT VISIT LOW MDM: CPT | Performed by: EMERGENCY MEDICINE

## 2024-06-19 PROCEDURE — 99284 EMERGENCY DEPT VISIT MOD MDM: CPT

## 2024-06-19 NOTE — PROGRESS NOTES
Referral received via HRR Medicaid Report. Referred to  High Utilizer Care Plan Committee on 6/18/24 to be reviewed for careplan.  Will await determination of case review.   OP MERT CM attempted patient outreach on 3/27/24 (see not).  Patient did not consent to CM.  No further action to be taken pending determination of case review.

## 2024-06-20 ENCOUNTER — TELEPHONE (OUTPATIENT)
Age: 64
End: 2024-06-20

## 2024-06-20 NOTE — ED PROVIDER NOTES
History  Chief Complaint   Patient presents with    Alcohol Intoxication     Patient called squad due to ETOH     Patient is a 64-year-old male with a history of A-fib, COPD, alcohol abuse, hypertension, hyperlipidemia presents to the emergency department with EMS for evaluation of acute alcohol intoxication.  Per EMS, patient called for assistance.  He is awake and alert at the time of initial evaluation, with slurred speech.      History provided by:  Patient   used: No    Alcohol Intoxication  Associated symptoms: no abdominal pain, no nausea, no palpitations, no shortness of breath and no vomiting        Prior to Admission Medications   Prescriptions Last Dose Informant Patient Reported? Taking?   Blood Glucose Monitoring Suppl (ONE TOUCH ULTRA MINI) w/Device KIT  Self Yes No   Sig: Use as directed   Patient not taking: Reported on 3/25/2024   Blood Pressure Monitoring (Adult Blood Pressure Cuff Lg) KIT   No No   Sig: Use in the morning   Patient not taking: Reported on 3/25/2024   Breo Ellipta 200-25 MCG/ACT inhaler  Self No No   Sig: Inhale 1 puff daily Rinse mouth after use.   Patient not taking: Reported on 3/25/2024   ONETOUCH DELICA LANCETS FINE MISC  Self Yes No   Sig: 3 (three) times a day Test   Thiamine HCl (vitamin B-1) 100 MG TABS  Self No No   Sig: TAKE 1 TABLET DAILY   Patient not taking: Reported on 3/25/2024   albuterol (Ventolin HFA) 90 mcg/act inhaler  Self No No   Sig: Inhale 2 puffs every 4 (four) hours as needed for wheezing   Patient not taking: Reported on 3/25/2024   aluminum-magnesium hydroxide-simethicone (MAALOX) 0290-6448-926 mg/30 mL suspension   No No   Sig: Take 30 mL by mouth every 4 (four) hours as needed for indigestion or heartburn   Patient not taking: Reported on 3/25/2024   apixaban (ELIQUIS) 5 mg   No No   Sig: Take 1 tablet (5 mg total) by mouth 2 (two) times a day Do not start before January 31, 2024.   aspirin (ECOTRIN LOW STRENGTH) 81 mg EC tablet   Self Yes No   Sig: Take 81 mg by mouth daily   ferrous sulfate 325 (65 Fe) mg tablet  Self No No   Sig: Take 1 tablet (325 mg total) by mouth daily with breakfast   folic acid (FOLVITE) 1 mg tablet   No No   Sig: TAKE 1 TABLET DAILY   gabapentin (NEURONTIN) 300 mg capsule   No No   Sig: TAKE ONE CAPSULE THREE TIMES DAILY   magnesium Oxide (MAG-OX) 400 mg TABS   No No   Sig: Take 1 tablet (400 mg total) by mouth 2 (two) times a day   metFORMIN (GLUCOPHAGE) 500 mg tablet   No No   Sig: TAKE 1 TABLET DAILY WITH BREAKFAST   metoprolol tartrate (LOPRESSOR) 50 mg tablet   No No   Sig: Take 1 tablet (50 mg total) by mouth every 12 (twelve) hours   naltrexone (REVIA) 50 mg tablet   No No   Sig: Take 1 tablet (50 mg total) by mouth daily   nicotine (NICODERM CQ) 21 mg/24 hr TD 24 hr patch  Self No No   Sig: Place 1 patch on the skin over 24 hours daily Do not start before December 4, 2023.   Patient not taking: Reported on 3/25/2024   pantoprazole (PROTONIX) 40 mg tablet   No No   Sig: TAKE 1 TABLET TWO TIMES A DAY   pravastatin (PRAVACHOL) 40 mg tablet  Self No No   Sig: TAKE 1 TABLET DAILY WITH DINNER   ranolazine (RANEXA) 500 mg 12 hr tablet   No No   Sig: TAKE 1 TABLET EVERY 12 HOURS   senna-docusate sodium (SENOKOT S) 8.6-50 mg per tablet   No No   Sig: Take 1 tablet by mouth daily as needed for constipation   Patient not taking: Reported on 3/25/2024   tamsulosin (FLOMAX) 0.4 mg   No No   Sig: TAKE 1 CAPSULE DAILY   varenicline (CHANTIX) 1 mg tablet   No No   Sig: TAKE 1 TABLET 2 TIMES A DAY   Patient not taking: Reported on 3/25/2024      Facility-Administered Medications: None       Past Medical History:   Diagnosis Date    Acute on chronic kidney failure  (HCC)     Alcohol withdrawal (HCC) 06/07/2019    Atrial fibrillation (HCC)     Cancer (HCC)     prostate ca,had radiation    Cardiac disease     stents,then triple bypass    COPD (chronic obstructive pulmonary disease) (HCC)     Coronary artery disease     ETOH  abuse     Heart failure ([facility])     History of heart surgery     says triple bypass [facility]    Hx of heart artery stent     2014    Hyperlipidemia     Hypertension     Hypovolemic shock ([facility]) 12/22/2019    Lumbar spondylitis ([facility]) 10/13/2022    Nasal bone fracture 10/10/2022    Prostate CA ([facility])     S/P CABG x 3     2004    Sleep apnea        Past Surgical History:   Procedure Laterality Date    CARDIAC CATHETERIZATION      2 stents    CORONARY ARTERY BYPASS GRAFT  2004    MS ARTHRD ANT INTERBODY MIN DSC CRV BELOW C2 N/A 12/16/2020    Procedure: Anterior cervical discectomy with fusion C4-C7; Posterior cervical decompression and fusion C2-T2;  Surgeon: David Rowell MD;  Location: [facility];  Service: Neurosurgery    TONSILLECTOMY         Family History   Problem Relation Age of Onset    Diabetes Mother     Uterine cancer Mother     COPD Father     Hypertension Father      I have reviewed and agree with the history as documented.    E-Cigarette/Vaping    E-Cigarette Use Never User      E-Cigarette/Vaping Substances    Nicotine Yes     THC No     CBD No     Flavoring No     Other No     Unknown No      Social History     Tobacco Use    Smoking status: Every Day     Current packs/day: 1.50     Average packs/day: 1.5 packs/day for 40.0 years (60.0 ttl pk-yrs)     Types: Cigarettes    Smokeless tobacco: Never   Vaping Use    Vaping status: Never Used   Substance Use Topics    Alcohol use: Yes     Alcohol/week: 4.0 standard drinks of alcohol     Types: 4 Standard drinks or equivalent per week     Comment: quart of vodka daily    Drug use: No       Review of Systems   Constitutional:  Negative for chills and fever.   Respiratory:  Negative for cough, shortness of breath and wheezing.    Cardiovascular:  Negative for chest pain and palpitations.   Gastrointestinal:  Negative for abdominal pain, constipation, diarrhea, nausea and vomiting.   Genitourinary:  Negative for dysuria, flank pain, hematuria and urgency.    Musculoskeletal:  Negative for back pain.   Skin:  Negative for color change and rash.   All other systems reviewed and are negative.      Physical Exam  Physical Exam  Vitals and nursing note reviewed.   Constitutional:       Appearance: He is well-developed.   HENT:      Head: Normocephalic and atraumatic.   Eyes:      Pupils: Pupils are equal, round, and reactive to light.   Cardiovascular:      Rate and Rhythm: Normal rate and regular rhythm.      Heart sounds: Normal heart sounds.   Pulmonary:      Effort: Pulmonary effort is normal.      Breath sounds: Normal breath sounds.   Abdominal:      General: Bowel sounds are normal. There is no distension.      Palpations: Abdomen is soft. There is no mass.      Tenderness: There is no abdominal tenderness. There is no guarding or rebound.   Musculoskeletal:      Cervical back: Normal range of motion and neck supple.   Skin:     General: Skin is warm and dry.      Capillary Refill: Capillary refill takes less than 2 seconds.   Neurological:      General: No focal deficit present.      Mental Status: He is alert.   Psychiatric:         Behavior: Behavior normal.         Thought Content: Thought content normal.         Judgment: Judgment normal.         Vital Signs  ED Triage Vitals [06/19/24 2208]   Temperature Pulse Respirations Blood Pressure SpO2   97.9 °F (36.6 °C) 91 18 148/75 96 %      Temp Source Heart Rate Source Patient Position - Orthostatic VS BP Location FiO2 (%)   Oral Monitor Lying Right arm --      Pain Score       --           Vitals:    06/19/24 2208   BP: 148/75   Pulse: 91   Patient Position - Orthostatic VS: Lying         Visual Acuity      ED Medications  Medications - No data to display    Diagnostic Studies  Results Reviewed       None                   No orders to display              Procedures  Procedures         ED Course                               SBIRT 20yo+      Flowsheet Row Most Recent Value   Initial Alcohol Screen: US AUDIT-C      1. How often do you have a drink containing alcohol? 6 Filed at: 06/19/2024 2116   2. How many drinks containing alcohol do you have on a typical day you are drinking?  6 Filed at: 06/19/2024 2116   3a. Male UNDER 65: How often do you have five or more drinks on one occasion? 6 Filed at: 06/19/2024 2116   Audit-C Score 18 Filed at: 06/19/2024 2116                      Medical Decision Making  64-year-old male in the ED with EMS for evaluation of alcohol intoxication.  Patient presents to the emergency department numerously for same complaints.  Will monitor for sobriety.  Patient will be signed out to oncoming physician who will discharge patient when he is clinically sober.             Disposition  Final diagnoses:   Alcohol intoxication (HCC)     Time reflects when diagnosis was documented in both MDM as applicable and the Disposition within this note       Time User Action Codes Description Comment    6/20/2024  3:21 AM Quinn Krishna [F10.929] Alcohol intoxication (HCC)           ED Disposition       ED Disposition   Discharge    Condition   Stable    Date/Time   Thu Jun 20, 2024 0321    Comment   Gregg Partida discharge to home/self care.                   Follow-up Information       Follow up With Specialties Details Why Contact Info    [facility]  In 1 week [phone]              Discharge Medication List as of 6/20/2024  5:41 AM        CONTINUE these medications which have NOT CHANGED    Details   albuterol (Ventolin HFA) 90 mcg/act inhaler Inhale 2 puffs every 4 (four) hours as needed for wheezing, Starting Tue 1/17/2023, Normal      aluminum-magnesium hydroxide-simethicone (MAALOX) 6166-0982-077 mg/30 mL suspension Take 30 mL by mouth every 4 (four) hours as needed for indigestion or heartburn, Starting Wed 2/14/2024, Normal      apixaban (ELIQUIS) 5 mg Take 1 tablet (5 mg total) by mouth 2 (two) times a day Do not start before January 31, 2024., Starting Wed 1/31/2024, Normal      aspirin  (ECOTRIN LOW STRENGTH) 81 mg EC tablet Take 81 mg by mouth daily, Historical Med      Blood Glucose Monitoring Suppl (ONE TOUCH ULTRA MINI) w/Device KIT Use as directed, Starting Thu 5/16/2019, Historical Med      Blood Pressure Monitoring (Adult Blood Pressure Cuff Lg) KIT Use in the morning, Starting Thu 12/14/2023, Normal      Breo Ellipta 200-25 MCG/ACT inhaler Inhale 1 puff daily Rinse mouth after use., Starting Fri 6/9/2023, Normal      ferrous sulfate 325 (65 Fe) mg tablet Take 1 tablet (325 mg total) by mouth daily with breakfast, Starting Fri 6/2/2023, Normal      folic acid (FOLVITE) 1 mg tablet TAKE 1 TABLET DAILY, Normal      gabapentin (NEURONTIN) 300 mg capsule TAKE ONE CAPSULE THREE TIMES DAILY, Normal      magnesium Oxide (MAG-OX) 400 mg TABS Take 1 tablet (400 mg total) by mouth 2 (two) times a day, Starting Wed 1/31/2024, Normal      metFORMIN (GLUCOPHAGE) 500 mg tablet TAKE 1 TABLET DAILY WITH BREAKFAST, Starting Sun 4/7/2024, Normal      metoprolol tartrate (LOPRESSOR) 50 mg tablet Take 1 tablet (50 mg total) by mouth every 12 (twelve) hours, Starting Tue 6/4/2024, Normal      naltrexone (REVIA) 50 mg tablet Take 1 tablet (50 mg total) by mouth daily, Starting Tue 6/4/2024, Normal      nicotine (NICODERM CQ) 21 mg/24 hr TD 24 hr patch Place 1 patch on the skin over 24 hours daily Do not start before December 4, 2023., Starting Mon 12/4/2023, Normal      ONETOUCH DELICA LANCETS FINE MISC 3 (three) times a day Test, Starting Thu 5/16/2019, Historical Med      pantoprazole (PROTONIX) 40 mg tablet TAKE 1 TABLET TWO TIMES A DAY, Normal      pravastatin (PRAVACHOL) 40 mg tablet TAKE 1 TABLET DAILY WITH DINNER, Normal      ranolazine (RANEXA) 500 mg 12 hr tablet TAKE 1 TABLET EVERY 12 HOURS, Normal      senna-docusate sodium (SENOKOT S) 8.6-50 mg per tablet Take 1 tablet by mouth daily as needed for constipation, Starting Sat 1/27/2024, No Print      tamsulosin (FLOMAX) 0.4 mg TAKE 1 CAPSULE DAILY,  Normal      Thiamine HCl (vitamin B-1) 100 MG TABS TAKE 1 TABLET DAILY, Normal      varenicline (CHANTIX) 1 mg tablet TAKE 1 TABLET 2 TIMES A DAY, Starting Tue 12/12/2023, Normal             No discharge procedures on file.    PDMP Review         Value Time User    PDMP Reviewed  Yes 1/24/2024  9:23 AM Tyrone Freire DO            ED Provider  Electronically Signed by             Franc Juarez DO  06/21/24 1170

## 2024-06-20 NOTE — ED NOTES
Pt attempted to elope at this time, MD and security called, pt escorted back to room at this time.      Sergio Ortez RN  06/19/24 8131

## 2024-06-20 NOTE — ED NOTES
"Pt demanding police be called and to go home, reminded again that he is not able to go unless he has someone take responsibility for him. Pt continually states \"that's bullshit, this is bullshit\". Ringing call bell consistently and yelling \"Nurse! Doctor!\", reminded again that he cannot leave until clinically sober or has someone to take him home, reminded that cab is not an option.      Sergio Ortez RN  06/19/24 7860    "

## 2024-06-20 NOTE — ED NOTES
Patient placed on virtual sitter due to being cognitively impaired and unsteady gait     Kelly Tracey RN  06/19/24 0939

## 2024-06-20 NOTE — TELEPHONE ENCOUNTER
Contacted Patient regarding recent ED Visit dated 6/19/24.     Advised patient to contact the office with new or worsen symptoms as we have On Call Provider 24 hrs. Provided office hours and phone.    No outreach done as  MERT RAMÍREZ has attempted to contact patient. No further action needed at this time.    ED:[facility]  CC: Alcohol Intoxication  DX:Alcohol Intoxication  Time:9:07pm    Last OV:6/4/24

## 2024-06-24 ENCOUNTER — TELEPHONE (OUTPATIENT)
Age: 64
End: 2024-06-24

## 2024-06-24 NOTE — TELEPHONE ENCOUNTER
Hi,    Patient is requesting a letter stating that he needs air conditioning due to his COPD.  He would like to pickup letter once completed.    [phone]

## 2024-06-25 ENCOUNTER — TELEPHONE (OUTPATIENT)
Age: 64
End: 2024-06-25

## 2024-06-25 NOTE — TELEPHONE ENCOUNTER
It looks like patient has upcoming appt w/ Dr. Presley 7/8/24. Is this something time sensitive that needs to be done right away?

## 2024-06-25 NOTE — TELEPHONE ENCOUNTER
Physician's Certification for Cooling Benefit    Scanned copy into encounter    Placed in Dr. Lo's folder in precepting room    Call when ready: [phone]

## 2024-06-26 NOTE — TELEPHONE ENCOUNTER
Form has been completed and placed in the white team folder in the clerical area     Thank You  Dr Freed

## 2024-06-26 NOTE — TELEPHONE ENCOUNTER
Thank you ,  Form has been scanned into patients chart, original has been placed at  for , patient has been notified.

## 2024-06-26 NOTE — TELEPHONE ENCOUNTER
Patient is calling to check the status of Cooling Assistance Form as he states he it due to be turned in tomorrow. Ensured patient once Form has been complete we will contact him.

## 2024-06-27 ENCOUNTER — RA CDI HCC (OUTPATIENT)
Dept: OTHER | Facility: HOSPITAL | Age: 64
End: 2024-06-27

## 2024-06-28 DIAGNOSIS — Z72.0 NICOTINE ABUSE: Chronic | ICD-10-CM

## 2024-06-28 DIAGNOSIS — I48.0 PAROXYSMAL ATRIAL FIBRILLATION (HCC): ICD-10-CM

## 2024-06-28 RX ORDER — VARENICLINE TARTRATE 1 MG/1
1 TABLET, FILM COATED ORAL 2 TIMES DAILY
Qty: 56 TABLET | Refills: 1 | Status: SHIPPED | OUTPATIENT
Start: 2024-06-28

## 2024-06-28 RX ORDER — APIXABAN 5 MG/1
TABLET, FILM COATED ORAL
Qty: 180 TABLET | Refills: 2 | Status: SHIPPED | OUTPATIENT
Start: 2024-06-28

## 2024-06-28 NOTE — TELEPHONE ENCOUNTER
Refill cannot be delegated.     PCP no longer in office. Routing to covering residents for white team- Dr Kenney & Dr Presley.

## 2024-08-01 ENCOUNTER — TELEPHONE (OUTPATIENT)
Age: 64
End: 2024-08-01

## 2024-08-01 NOTE — TELEPHONE ENCOUNTER
----- Message from Lilliana Bliss MD sent at 7/31/2024  8:00 PM EDT -----  Regarding: Follow up visit needed  Dear Red team clerical staff:    Please contact this patient for a follow-up visit within 1 month to follow up on chronic medical issus and to close care gaps. Could you please help patient sign up for MyChart? If patient asks, I cannot prescribe his medication before the visit because it can be dangerous.    Thanks, Dr Lilliana Bliss MD

## 2024-08-15 ENCOUNTER — APPOINTMENT (EMERGENCY)
Dept: RADIOLOGY | Facility: HOSPITAL | Age: 64
End: 2024-08-15
Payer: COMMERCIAL

## 2024-08-15 ENCOUNTER — HOSPITAL ENCOUNTER (OUTPATIENT)
Facility: HOSPITAL | Age: 64
Setting detail: OBSERVATION
Discharge: HOME | End: 2024-08-17
Attending: EMERGENCY MEDICINE | Admitting: STUDENT IN AN ORGANIZED HEALTH CARE EDUCATION/TRAINING PROGRAM
Payer: COMMERCIAL

## 2024-08-15 DIAGNOSIS — R07.9 CHEST PAIN: ICD-10-CM

## 2024-08-15 DIAGNOSIS — M48.02 CERVICAL SPINAL STENOSIS: Chronic | ICD-10-CM

## 2024-08-15 DIAGNOSIS — E83.42 HYPOMAGNESEMIA: ICD-10-CM

## 2024-08-15 DIAGNOSIS — F10.10 ALCOHOL ABUSE: ICD-10-CM

## 2024-08-15 DIAGNOSIS — E87.1 HYPONATREMIA: Primary | ICD-10-CM

## 2024-08-15 DIAGNOSIS — D69.6 THROMBOCYTOPENIA (HCC): Chronic | ICD-10-CM

## 2024-08-15 PROBLEM — N17.9 AKI (ACUTE KIDNEY INJURY) (HCC): Status: ACTIVE | Noted: 2024-02-10

## 2024-08-15 LAB
2HR DELTA HS TROPONIN: -6 NG/L
4HR DELTA HS TROPONIN: -9 NG/L
ALBUMIN SERPL BCG-MCNC: 3.3 G/DL (ref 3.5–5)
ALBUMIN SERPL BCG-MCNC: 4 G/DL (ref 3.5–5)
ALP SERPL-CCNC: 56 U/L (ref 34–104)
ALP SERPL-CCNC: 67 U/L (ref 34–104)
ALT SERPL W P-5'-P-CCNC: 47 U/L (ref 7–52)
ALT SERPL W P-5'-P-CCNC: 62 U/L (ref 7–52)
AMPHETAMINES SERPL QL SCN: NEGATIVE
ANION GAP SERPL CALCULATED.3IONS-SCNC: 11 MMOL/L (ref 4–13)
ANION GAP SERPL CALCULATED.3IONS-SCNC: 14 MMOL/L (ref 4–13)
ANION GAP SERPL CALCULATED.3IONS-SCNC: 8 MMOL/L (ref 4–13)
AST SERPL W P-5'-P-CCNC: 45 U/L (ref 13–39)
AST SERPL W P-5'-P-CCNC: 68 U/L (ref 13–39)
BARBITURATES UR QL: NEGATIVE
BASOPHILS # BLD AUTO: 0.01 THOUSANDS/ÂΜL (ref 0–0.1)
BASOPHILS # BLD AUTO: 0.02 THOUSANDS/ÂΜL (ref 0–0.1)
BASOPHILS # BLD AUTO: 0.04 THOUSANDS/ÂΜL (ref 0–0.1)
BASOPHILS NFR BLD AUTO: 0 % (ref 0–1)
BASOPHILS NFR BLD AUTO: 0 % (ref 0–1)
BASOPHILS NFR BLD AUTO: 1 % (ref 0–1)
BENZODIAZ UR QL: NEGATIVE
BILIRUB SERPL-MCNC: 0.49 MG/DL (ref 0.2–1)
BILIRUB SERPL-MCNC: 0.63 MG/DL (ref 0.2–1)
BILIRUB UR QL STRIP: NEGATIVE
BUN SERPL-MCNC: 29 MG/DL (ref 5–25)
BUN SERPL-MCNC: 30 MG/DL (ref 5–25)
BUN SERPL-MCNC: 32 MG/DL (ref 5–25)
CALCIUM ALBUM COR SERPL-MCNC: 8.1 MG/DL (ref 8.3–10.1)
CALCIUM SERPL-MCNC: 7.5 MG/DL (ref 8.4–10.2)
CALCIUM SERPL-MCNC: 8.2 MG/DL (ref 8.4–10.2)
CALCIUM SERPL-MCNC: 8.3 MG/DL (ref 8.4–10.2)
CARDIAC TROPONIN I PNL SERPL HS: 30 NG/L
CARDIAC TROPONIN I PNL SERPL HS: 33 NG/L
CARDIAC TROPONIN I PNL SERPL HS: 39 NG/L
CHLORIDE SERPL-SCNC: 103 MMOL/L (ref 96–108)
CHLORIDE SERPL-SCNC: 95 MMOL/L (ref 96–108)
CHLORIDE SERPL-SCNC: 99 MMOL/L (ref 96–108)
CLARITY UR: CLEAR
CO2 SERPL-SCNC: 20 MMOL/L (ref 21–32)
CO2 SERPL-SCNC: 21 MMOL/L (ref 21–32)
CO2 SERPL-SCNC: 23 MMOL/L (ref 21–32)
COCAINE UR QL: NEGATIVE
COLOR UR: YELLOW
CREAT SERPL-MCNC: 1.23 MG/DL (ref 0.6–1.3)
CREAT SERPL-MCNC: 1.6 MG/DL (ref 0.6–1.3)
CREAT SERPL-MCNC: 1.79 MG/DL (ref 0.6–1.3)
EOSINOPHIL # BLD AUTO: 0.21 THOUSAND/ÂΜL (ref 0–0.61)
EOSINOPHIL # BLD AUTO: 0.22 THOUSAND/ÂΜL (ref 0–0.61)
EOSINOPHIL # BLD AUTO: 0.27 THOUSAND/ÂΜL (ref 0–0.61)
EOSINOPHIL NFR BLD AUTO: 3 % (ref 0–6)
EOSINOPHIL NFR BLD AUTO: 5 % (ref 0–6)
EOSINOPHIL NFR BLD AUTO: 6 % (ref 0–6)
ERYTHROCYTE [DISTWIDTH] IN BLOOD BY AUTOMATED COUNT: 15 % (ref 11.6–15.1)
ERYTHROCYTE [DISTWIDTH] IN BLOOD BY AUTOMATED COUNT: 15.1 % (ref 11.6–15.1)
ERYTHROCYTE [DISTWIDTH] IN BLOOD BY AUTOMATED COUNT: 15.4 % (ref 11.6–15.1)
EST. AVERAGE GLUCOSE BLD GHB EST-MCNC: 108 MG/DL
ETHANOL SERPL-MCNC: 13 MG/DL
FENTANYL UR QL SCN: NEGATIVE
FLUAV RNA RESP QL NAA+PROBE: NEGATIVE
FLUBV RNA RESP QL NAA+PROBE: NEGATIVE
GFR SERPL CREATININE-BSD FRML MDRD: 39 ML/MIN/1.73SQ M
GFR SERPL CREATININE-BSD FRML MDRD: 45 ML/MIN/1.73SQ M
GFR SERPL CREATININE-BSD FRML MDRD: 62 ML/MIN/1.73SQ M
GLUCOSE SERPL-MCNC: 120 MG/DL (ref 65–140)
GLUCOSE SERPL-MCNC: 123 MG/DL (ref 65–140)
GLUCOSE SERPL-MCNC: 124 MG/DL (ref 65–140)
GLUCOSE SERPL-MCNC: 157 MG/DL (ref 65–140)
GLUCOSE SERPL-MCNC: 289 MG/DL (ref 65–140)
GLUCOSE SERPL-MCNC: 67 MG/DL (ref 65–140)
GLUCOSE SERPL-MCNC: 82 MG/DL (ref 65–140)
GLUCOSE SERPL-MCNC: 91 MG/DL (ref 65–140)
GLUCOSE UR STRIP-MCNC: NEGATIVE MG/DL
HBA1C MFR BLD: 5.4 %
HCT VFR BLD AUTO: 29.1 % (ref 36.5–49.3)
HCT VFR BLD AUTO: 31.4 % (ref 36.5–49.3)
HCT VFR BLD AUTO: 36.5 % (ref 36.5–49.3)
HGB BLD-MCNC: 10.5 G/DL (ref 12–17)
HGB BLD-MCNC: 12.1 G/DL (ref 12–17)
HGB BLD-MCNC: 9.7 G/DL (ref 12–17)
HGB UR QL STRIP.AUTO: NEGATIVE
HYDROCODONE UR QL SCN: NEGATIVE
IMM GRANULOCYTES # BLD AUTO: 0.02 THOUSAND/UL (ref 0–0.2)
IMM GRANULOCYTES # BLD AUTO: 0.02 THOUSAND/UL (ref 0–0.2)
IMM GRANULOCYTES # BLD AUTO: 0.03 THOUSAND/UL (ref 0–0.2)
IMM GRANULOCYTES NFR BLD AUTO: 0 % (ref 0–2)
KETONES UR STRIP-MCNC: NEGATIVE MG/DL
LEUKOCYTE ESTERASE UR QL STRIP: NEGATIVE
LYMPHOCYTES # BLD AUTO: 0.8 THOUSANDS/ÂΜL (ref 0.6–4.47)
LYMPHOCYTES # BLD AUTO: 1.13 THOUSANDS/ÂΜL (ref 0.6–4.47)
LYMPHOCYTES # BLD AUTO: 1.21 THOUSANDS/ÂΜL (ref 0.6–4.47)
LYMPHOCYTES NFR BLD AUTO: 16 % (ref 14–44)
LYMPHOCYTES NFR BLD AUTO: 18 % (ref 14–44)
LYMPHOCYTES NFR BLD AUTO: 25 % (ref 14–44)
MAGNESIUM SERPL-MCNC: 0.9 MG/DL (ref 1.9–2.7)
MAGNESIUM SERPL-MCNC: 1.8 MG/DL (ref 1.9–2.7)
MCH RBC QN AUTO: 31.9 PG (ref 26.8–34.3)
MCH RBC QN AUTO: 32.2 PG (ref 26.8–34.3)
MCH RBC QN AUTO: 32.7 PG (ref 26.8–34.3)
MCHC RBC AUTO-ENTMCNC: 33.2 G/DL (ref 31.4–37.4)
MCHC RBC AUTO-ENTMCNC: 33.3 G/DL (ref 31.4–37.4)
MCHC RBC AUTO-ENTMCNC: 33.4 G/DL (ref 31.4–37.4)
MCV RBC AUTO: 96 FL (ref 82–98)
MCV RBC AUTO: 97 FL (ref 82–98)
MCV RBC AUTO: 98 FL (ref 82–98)
METHADONE UR QL: NEGATIVE
MONOCYTES # BLD AUTO: 0.52 THOUSAND/ÂΜL (ref 0.17–1.22)
MONOCYTES # BLD AUTO: 0.61 THOUSAND/ÂΜL (ref 0.17–1.22)
MONOCYTES # BLD AUTO: 0.8 THOUSAND/ÂΜL (ref 0.17–1.22)
MONOCYTES NFR BLD AUTO: 11 % (ref 4–12)
MONOCYTES NFR BLD AUTO: 12 % (ref 4–12)
MONOCYTES NFR BLD AUTO: 12 % (ref 4–12)
NEUTROPHILS # BLD AUTO: 2.8 THOUSANDS/ÂΜL (ref 1.85–7.62)
NEUTROPHILS # BLD AUTO: 2.88 THOUSANDS/ÂΜL (ref 1.85–7.62)
NEUTROPHILS # BLD AUTO: 4.92 THOUSANDS/ÂΜL (ref 1.85–7.62)
NEUTS SEG NFR BLD AUTO: 57 % (ref 43–75)
NEUTS SEG NFR BLD AUTO: 65 % (ref 43–75)
NEUTS SEG NFR BLD AUTO: 69 % (ref 43–75)
NITRITE UR QL STRIP: NEGATIVE
NRBC BLD AUTO-RTO: 0 /100 WBCS
OPIATES UR QL SCN: NEGATIVE
OXYCODONE+OXYMORPHONE UR QL SCN: NEGATIVE
PCP UR QL: NEGATIVE
PH UR STRIP.AUTO: 5.5 [PH]
PLATELET # BLD AUTO: 52 THOUSANDS/UL (ref 149–390)
PLATELET # BLD AUTO: 57 THOUSANDS/UL (ref 149–390)
PLATELET # BLD AUTO: 68 THOUSANDS/UL (ref 149–390)
PMV BLD AUTO: 10.9 FL (ref 8.9–12.7)
PMV BLD AUTO: 11.2 FL (ref 8.9–12.7)
PMV BLD AUTO: 12.4 FL (ref 8.9–12.7)
POTASSIUM SERPL-SCNC: 3.9 MMOL/L (ref 3.5–5.3)
POTASSIUM SERPL-SCNC: 4.1 MMOL/L (ref 3.5–5.3)
POTASSIUM SERPL-SCNC: 4.7 MMOL/L (ref 3.5–5.3)
PROT SERPL-MCNC: 5.5 G/DL (ref 6.4–8.4)
PROT SERPL-MCNC: 7 G/DL (ref 6.4–8.4)
PROT UR STRIP-MCNC: NEGATIVE MG/DL
RBC # BLD AUTO: 3.01 MILLION/UL (ref 3.88–5.62)
RBC # BLD AUTO: 3.21 MILLION/UL (ref 3.88–5.62)
RBC # BLD AUTO: 3.79 MILLION/UL (ref 3.88–5.62)
RSV RNA RESP QL NAA+PROBE: NEGATIVE
SARS-COV-2 RNA RESP QL NAA+PROBE: NEGATIVE
SODIUM SERPL-SCNC: 129 MMOL/L (ref 135–147)
SODIUM SERPL-SCNC: 131 MMOL/L (ref 135–147)
SODIUM SERPL-SCNC: 134 MMOL/L (ref 135–147)
SP GR UR STRIP.AUTO: 1.01 (ref 1–1.03)
THC UR QL: NEGATIVE
UROBILINOGEN UR STRIP-ACNC: 2 MG/DL
WBC # BLD AUTO: 4.46 THOUSAND/UL (ref 4.31–10.16)
WBC # BLD AUTO: 4.92 THOUSAND/UL (ref 4.31–10.16)
WBC # BLD AUTO: 7.13 THOUSAND/UL (ref 4.31–10.16)

## 2024-08-15 PROCEDURE — 80048 BASIC METABOLIC PNL TOTAL CA: CPT

## 2024-08-15 PROCEDURE — 80307 DRUG TEST PRSMV CHEM ANLYZR: CPT

## 2024-08-15 PROCEDURE — 83735 ASSAY OF MAGNESIUM: CPT

## 2024-08-15 PROCEDURE — 96360 HYDRATION IV INFUSION INIT: CPT

## 2024-08-15 PROCEDURE — 83036 HEMOGLOBIN GLYCOSYLATED A1C: CPT

## 2024-08-15 PROCEDURE — 85025 COMPLETE CBC W/AUTO DIFF WBC: CPT | Performed by: EMERGENCY MEDICINE

## 2024-08-15 PROCEDURE — 36415 COLL VENOUS BLD VENIPUNCTURE: CPT | Performed by: EMERGENCY MEDICINE

## 2024-08-15 PROCEDURE — 93005 ELECTROCARDIOGRAM TRACING: CPT

## 2024-08-15 PROCEDURE — 85025 COMPLETE CBC W/AUTO DIFF WBC: CPT

## 2024-08-15 PROCEDURE — 81003 URINALYSIS AUTO W/O SCOPE: CPT

## 2024-08-15 PROCEDURE — 82948 REAGENT STRIP/BLOOD GLUCOSE: CPT

## 2024-08-15 PROCEDURE — 99285 EMERGENCY DEPT VISIT HI MDM: CPT | Performed by: EMERGENCY MEDICINE

## 2024-08-15 PROCEDURE — 71045 X-RAY EXAM CHEST 1 VIEW: CPT

## 2024-08-15 PROCEDURE — 0241U HB NFCT DS VIR RESP RNA 4 TRGT: CPT

## 2024-08-15 PROCEDURE — 84484 ASSAY OF TROPONIN QUANT: CPT | Performed by: EMERGENCY MEDICINE

## 2024-08-15 PROCEDURE — 99222 1ST HOSP IP/OBS MODERATE 55: CPT | Performed by: STUDENT IN AN ORGANIZED HEALTH CARE EDUCATION/TRAINING PROGRAM

## 2024-08-15 PROCEDURE — 80053 COMPREHEN METABOLIC PANEL: CPT | Performed by: EMERGENCY MEDICINE

## 2024-08-15 PROCEDURE — 99285 EMERGENCY DEPT VISIT HI MDM: CPT

## 2024-08-15 PROCEDURE — 82077 ASSAY SPEC XCP UR&BREATH IA: CPT

## 2024-08-15 RX ORDER — CHLORDIAZEPOXIDE HYDROCHLORIDE 25 MG/1
50 CAPSULE, GELATIN COATED ORAL EVERY 6 HOURS SCHEDULED
Status: COMPLETED | OUTPATIENT
Start: 2024-08-15 | End: 2024-08-16

## 2024-08-15 RX ORDER — MAGNESIUM SULFATE HEPTAHYDRATE 40 MG/ML
4 INJECTION, SOLUTION INTRAVENOUS ONCE
Status: DISCONTINUED | OUTPATIENT
Start: 2024-08-15 | End: 2024-08-15 | Stop reason: RX

## 2024-08-15 RX ORDER — POLYETHYLENE GLYCOL 3350 17 G/17G
17 POWDER, FOR SOLUTION ORAL DAILY PRN
Status: DISCONTINUED | OUTPATIENT
Start: 2024-08-15 | End: 2024-08-17 | Stop reason: HOSPADM

## 2024-08-15 RX ORDER — MAGNESIUM SULFATE HEPTAHYDRATE 40 MG/ML
4 INJECTION, SOLUTION INTRAVENOUS ONCE
Status: DISCONTINUED | OUTPATIENT
Start: 2024-08-15 | End: 2024-08-15

## 2024-08-15 RX ORDER — ONDANSETRON 2 MG/ML
4 INJECTION INTRAMUSCULAR; INTRAVENOUS EVERY 6 HOURS PRN
Status: DISCONTINUED | OUTPATIENT
Start: 2024-08-15 | End: 2024-08-17 | Stop reason: HOSPADM

## 2024-08-15 RX ORDER — FERROUS SULFATE 325(65) MG
325 TABLET ORAL
Status: DISCONTINUED | OUTPATIENT
Start: 2024-08-15 | End: 2024-08-17 | Stop reason: HOSPADM

## 2024-08-15 RX ORDER — LANOLIN ALCOHOL/MO/W.PET/CERES
100 CREAM (GRAM) TOPICAL DAILY
Status: DISCONTINUED | OUTPATIENT
Start: 2024-08-15 | End: 2024-08-17 | Stop reason: HOSPADM

## 2024-08-15 RX ORDER — MAGNESIUM HYDROXIDE/ALUMINUM HYDROXICE/SIMETHICONE 120; 1200; 1200 MG/30ML; MG/30ML; MG/30ML
30 SUSPENSION ORAL EVERY 4 HOURS PRN
Status: DISCONTINUED | OUTPATIENT
Start: 2024-08-15 | End: 2024-08-17 | Stop reason: HOSPADM

## 2024-08-15 RX ORDER — MAGNESIUM SULFATE HEPTAHYDRATE 40 MG/ML
2 INJECTION, SOLUTION INTRAVENOUS
Status: DISCONTINUED | OUTPATIENT
Start: 2024-08-15 | End: 2024-08-15

## 2024-08-15 RX ORDER — NICOTINE 21 MG/24HR
1 PATCH, TRANSDERMAL 24 HOURS TRANSDERMAL DAILY
Status: DISCONTINUED | OUTPATIENT
Start: 2024-08-15 | End: 2024-08-17 | Stop reason: HOSPADM

## 2024-08-15 RX ORDER — SODIUM CHLORIDE, SODIUM LACTATE, POTASSIUM CHLORIDE, CALCIUM CHLORIDE 600; 310; 30; 20 MG/100ML; MG/100ML; MG/100ML; MG/100ML
75 INJECTION, SOLUTION INTRAVENOUS CONTINUOUS
Status: DISCONTINUED | OUTPATIENT
Start: 2024-08-15 | End: 2024-08-16

## 2024-08-15 RX ORDER — FLUTICASONE FUROATE AND VILANTEROL 200; 25 UG/1; UG/1
1 POWDER RESPIRATORY (INHALATION) DAILY
Status: DISCONTINUED | OUTPATIENT
Start: 2024-08-15 | End: 2024-08-17 | Stop reason: HOSPADM

## 2024-08-15 RX ORDER — PANTOPRAZOLE SODIUM 40 MG/1
40 TABLET, DELAYED RELEASE ORAL
Status: DISCONTINUED | OUTPATIENT
Start: 2024-08-15 | End: 2024-08-17 | Stop reason: HOSPADM

## 2024-08-15 RX ORDER — LOPERAMIDE HCL 2 MG
2 CAPSULE ORAL ONCE
Status: DISCONTINUED | OUTPATIENT
Start: 2024-08-15 | End: 2024-08-15

## 2024-08-15 RX ORDER — LANOLIN ALCOHOL/MO/W.PET/CERES
400 CREAM (GRAM) TOPICAL 2 TIMES DAILY
Status: DISCONTINUED | OUTPATIENT
Start: 2024-08-15 | End: 2024-08-17 | Stop reason: HOSPADM

## 2024-08-15 RX ORDER — RANOLAZINE 500 MG/1
500 TABLET, EXTENDED RELEASE ORAL EVERY 12 HOURS SCHEDULED
Status: DISCONTINUED | OUTPATIENT
Start: 2024-08-15 | End: 2024-08-17 | Stop reason: HOSPADM

## 2024-08-15 RX ORDER — ASPIRIN 81 MG/1
81 TABLET, CHEWABLE ORAL DAILY
Status: DISCONTINUED | OUTPATIENT
Start: 2024-08-15 | End: 2024-08-15

## 2024-08-15 RX ORDER — CHLORDIAZEPOXIDE HYDROCHLORIDE 25 MG/1
50 CAPSULE, GELATIN COATED ORAL ONCE
Status: COMPLETED | OUTPATIENT
Start: 2024-08-15 | End: 2024-08-15

## 2024-08-15 RX ORDER — PRAVASTATIN SODIUM 40 MG
40 TABLET ORAL
Status: DISCONTINUED | OUTPATIENT
Start: 2024-08-15 | End: 2024-08-17 | Stop reason: HOSPADM

## 2024-08-15 RX ORDER — MAGNESIUM SULFATE HEPTAHYDRATE 40 MG/ML
2 INJECTION, SOLUTION INTRAVENOUS ONCE
Status: COMPLETED | OUTPATIENT
Start: 2024-08-15 | End: 2024-08-15

## 2024-08-15 RX ORDER — CHLORDIAZEPOXIDE HYDROCHLORIDE 25 MG/1
50 CAPSULE, GELATIN COATED ORAL EVERY 8 HOURS SCHEDULED
Status: DISCONTINUED | OUTPATIENT
Start: 2024-08-16 | End: 2024-08-16

## 2024-08-15 RX ORDER — TAMSULOSIN HYDROCHLORIDE 0.4 MG/1
0.4 CAPSULE ORAL DAILY
Status: DISCONTINUED | OUTPATIENT
Start: 2024-08-15 | End: 2024-08-17 | Stop reason: HOSPADM

## 2024-08-15 RX ORDER — LOPERAMIDE HCL 2 MG
2 CAPSULE ORAL EVERY 4 HOURS PRN
Status: DISCONTINUED | OUTPATIENT
Start: 2024-08-15 | End: 2024-08-17 | Stop reason: HOSPADM

## 2024-08-15 RX ORDER — METOPROLOL TARTRATE 50 MG
50 TABLET ORAL EVERY 12 HOURS SCHEDULED
Status: DISCONTINUED | OUTPATIENT
Start: 2024-08-15 | End: 2024-08-17 | Stop reason: HOSPADM

## 2024-08-15 RX ORDER — FOLIC ACID 1 MG/1
1000 TABLET ORAL DAILY
Status: DISCONTINUED | OUTPATIENT
Start: 2024-08-15 | End: 2024-08-17 | Stop reason: HOSPADM

## 2024-08-15 RX ORDER — MAGNESIUM SULFATE HEPTAHYDRATE 40 MG/ML
2 INJECTION, SOLUTION INTRAVENOUS
Status: COMPLETED | OUTPATIENT
Start: 2024-08-15 | End: 2024-08-15

## 2024-08-15 RX ORDER — INSULIN LISPRO 100 [IU]/ML
1-6 INJECTION, SOLUTION INTRAVENOUS; SUBCUTANEOUS
Status: DISCONTINUED | OUTPATIENT
Start: 2024-08-15 | End: 2024-08-17 | Stop reason: HOSPADM

## 2024-08-15 RX ORDER — CHLORDIAZEPOXIDE HYDROCHLORIDE 25 MG/1
50 CAPSULE, GELATIN COATED ORAL EVERY 6 HOURS SCHEDULED
Status: DISCONTINUED | OUTPATIENT
Start: 2024-08-15 | End: 2024-08-15

## 2024-08-15 RX ORDER — MAGNESIUM SULFATE HEPTAHYDRATE 40 MG/ML
2 INJECTION, SOLUTION INTRAVENOUS
Status: DISCONTINUED | OUTPATIENT
Start: 2024-08-15 | End: 2024-08-15 | Stop reason: SDUPTHER

## 2024-08-15 RX ADMIN — CHLORDIAZEPOXIDE HYDROCHLORIDE 50 MG: 25 CAPSULE ORAL at 17:25

## 2024-08-15 RX ADMIN — CHLORDIAZEPOXIDE HYDROCHLORIDE 50 MG: 25 CAPSULE ORAL at 05:12

## 2024-08-15 RX ADMIN — CHLORDIAZEPOXIDE HYDROCHLORIDE 50 MG: 25 CAPSULE ORAL at 12:00

## 2024-08-15 RX ADMIN — FERROUS SULFATE TAB 325 MG (65 MG ELEMENTAL FE) 325 MG: 325 (65 FE) TAB at 09:54

## 2024-08-15 RX ADMIN — MAGNESIUM SULFATE HEPTAHYDRATE 2 G: 40 INJECTION, SOLUTION INTRAVENOUS at 09:21

## 2024-08-15 RX ADMIN — MAGNESIUM SULFATE HEPTAHYDRATE 2 G: 40 INJECTION, SOLUTION INTRAVENOUS at 04:39

## 2024-08-15 RX ADMIN — NICOTINE 1 PATCH: 14 PATCH, EXTENDED RELEASE TRANSDERMAL at 09:55

## 2024-08-15 RX ADMIN — PANTOPRAZOLE SODIUM 40 MG: 40 TABLET, DELAYED RELEASE ORAL at 06:28

## 2024-08-15 RX ADMIN — MAGNESIUM SULFATE HEPTAHYDRATE 2 G: 40 INJECTION, SOLUTION INTRAVENOUS at 18:25

## 2024-08-15 RX ADMIN — METOPROLOL TARTRATE 50 MG: 50 TABLET, FILM COATED ORAL at 22:07

## 2024-08-15 RX ADMIN — RANOLAZINE 500 MG: 500 TABLET, FILM COATED, EXTENDED RELEASE ORAL at 09:54

## 2024-08-15 RX ADMIN — Medication 400 MG: at 09:55

## 2024-08-15 RX ADMIN — INSULIN LISPRO 4 UNITS: 100 INJECTION, SOLUTION INTRAVENOUS; SUBCUTANEOUS at 17:24

## 2024-08-15 RX ADMIN — APIXABAN 5 MG: 5 TABLET, FILM COATED ORAL at 17:25

## 2024-08-15 RX ADMIN — CHLORDIAZEPOXIDE HYDROCHLORIDE 50 MG: 25 CAPSULE ORAL at 00:44

## 2024-08-15 RX ADMIN — SODIUM CHLORIDE, SODIUM LACTATE, POTASSIUM CHLORIDE, AND CALCIUM CHLORIDE 75 ML/HR: .6; .31; .03; .02 INJECTION, SOLUTION INTRAVENOUS at 18:25

## 2024-08-15 RX ADMIN — SODIUM CHLORIDE, SODIUM LACTATE, POTASSIUM CHLORIDE, AND CALCIUM CHLORIDE 125 ML/HR: .6; .31; .03; .02 INJECTION, SOLUTION INTRAVENOUS at 03:45

## 2024-08-15 RX ADMIN — RANOLAZINE 500 MG: 500 TABLET, FILM COATED, EXTENDED RELEASE ORAL at 22:07

## 2024-08-15 RX ADMIN — FLUTICASONE FUROATE AND VILANTEROL TRIFENATATE 1 PUFF: 200; 25 POWDER RESPIRATORY (INHALATION) at 09:58

## 2024-08-15 RX ADMIN — ALUMINUM HYDROXIDE, MAGNESIUM HYDROXIDE, DIMETHICONE 30 ML: 400; 400; 40 SUSPENSION ORAL at 05:12

## 2024-08-15 RX ADMIN — SODIUM CHLORIDE 1000 ML: 0.9 INJECTION, SOLUTION INTRAVENOUS at 00:32

## 2024-08-15 RX ADMIN — TAMSULOSIN HYDROCHLORIDE 0.4 MG: 0.4 CAPSULE ORAL at 09:54

## 2024-08-15 RX ADMIN — INSULIN LISPRO 1 UNITS: 100 INJECTION, SOLUTION INTRAVENOUS; SUBCUTANEOUS at 22:08

## 2024-08-15 RX ADMIN — SODIUM CHLORIDE, SODIUM LACTATE, POTASSIUM CHLORIDE, AND CALCIUM CHLORIDE 125 ML/HR: .6; .31; .03; .02 INJECTION, SOLUTION INTRAVENOUS at 02:34

## 2024-08-15 RX ADMIN — METOPROLOL TARTRATE 50 MG: 50 TABLET, FILM COATED ORAL at 09:55

## 2024-08-15 RX ADMIN — APIXABAN 5 MG: 5 TABLET, FILM COATED ORAL at 09:55

## 2024-08-15 RX ADMIN — FOLIC ACID 1000 MCG: 1 TABLET ORAL at 09:55

## 2024-08-15 RX ADMIN — THIAMINE HCL TAB 100 MG 100 MG: 100 TAB at 09:55

## 2024-08-15 RX ADMIN — MAGNESIUM SULFATE HEPTAHYDRATE 2 G: 40 INJECTION, SOLUTION INTRAVENOUS at 06:28

## 2024-08-15 RX ADMIN — PRAVASTATIN SODIUM 40 MG: 40 TABLET ORAL at 17:25

## 2024-08-15 RX ADMIN — Medication 400 MG: at 17:26

## 2024-08-15 RX ADMIN — PANTOPRAZOLE SODIUM 40 MG: 40 TABLET, DELAYED RELEASE ORAL at 17:26

## 2024-08-15 RX ADMIN — SODIUM CHLORIDE, SODIUM LACTATE, POTASSIUM CHLORIDE, AND CALCIUM CHLORIDE 1000 ML: .6; .31; .03; .02 INJECTION, SOLUTION INTRAVENOUS at 03:46

## 2024-08-15 NOTE — PLAN OF CARE
Problem: PAIN - ADULT  Goal: Verbalizes/displays adequate comfort level or baseline comfort level  Description: Interventions:  - Encourage patient to monitor pain and request assistance  - Assess pain using appropriate pain scale  - Administer analgesics based on type and severity of pain and evaluate response  - Implement non-pharmacological measures as appropriate and evaluate response  - Consider cultural and social influences on pain and pain management  - Notify physician/advanced practitioner if interventions unsuccessful or patient reports new pain  Outcome: Progressing     Problem: INFECTION - ADULT  Goal: Absence or prevention of progression during hospitalization  Description: INTERVENTIONS:  - Assess and monitor for signs and symptoms of infection  - Monitor lab/diagnostic results  - Monitor all insertion sites, i.e. indwelling lines, tubes, and drains  - Monitor endotracheal if appropriate and nasal secretions for changes in amount and color  - [place] appropriate cooling/warming therapies per order  - Administer medications as ordered  - Instruct and encourage patient and family to use good hand hygiene technique  - Identify and instruct in appropriate isolation precautions for identified infection/condition  Outcome: Progressing     Problem: SAFETY ADULT  Goal: Patient will remain free of falls  Description: INTERVENTIONS:  - Educate patient/family on patient safety including physical limitations  - Instruct patient to call for assistance with activity   - Consult OT/PT to assist with strengthening/mobility   - Keep Call bell within reach  - Keep bed low and locked with side rails adjusted as appropriate  - Keep care items and personal belongings within reach  - Initiate and maintain comfort rounds  - Make Fall Risk Sign visible to staff  - Offer Toileting every 2 Hours, in advance of need  - Initiate/Maintain bed alarm  - Obtain necessary fall risk management equipment: yellow socks  - Apply  yellow socks and bracelet for high fall risk patients  - Consider moving patient to room near nurses station  Outcome: Progressing  Goal: Maintain or return to baseline ADL function  Description: INTERVENTIONS:  -  Assess patient's ability to carry out ADLs; assess patient's baseline for ADL function and identify physical deficits which impact ability to perform ADLs (bathing, care of mouth/teeth, toileting, grooming, dressing, etc.)  - Assess/evaluate cause of self-care deficits   - Assess range of motion  - Assess patient's mobility; develop plan if impaired  - Assess patient's need for assistive devices and provide as appropriate  - Encourage maximum independence but intervene and supervise when necessary  - Involve family in performance of ADLs  - Assess for home care needs following discharge   - Consider OT consult to assist with ADL evaluation and planning for discharge  - Provide patient education as appropriate  Outcome: Progressing  Goal: Maintains/Returns to pre admission functional level  Description: INTERVENTIONS:  - Perform AM-PAC 6 Click Basic Mobility/ Daily Activity assessment daily.  - Set and communicate daily mobility goal to care team and patient/family/caregiver.   - Collaborate with rehabilitation services on mobility goals if consulted  - Perform Range of Motion 4 times a day.  - Reposition patient every 2 hours.  - Dangle patient 3 times a day  - Stand patient 3 times a day  - Ambulate patient 3 times a day  - Out of bed to chair 3 times a day   - Out of bed for meals 3 times a day  - Out of bed for toileting  - Record patient progress and toleration of activity level   Outcome: Progressing     Problem: DISCHARGE PLANNING  Goal: Discharge to home or other facility with appropriate resources  Description: INTERVENTIONS:  - Identify barriers to discharge w/patient and caregiver  - Arrange for needed discharge resources and transportation as appropriate  - Identify discharge learning needs  (meds, wound care, etc.)  - Arrange for interpretive services to assist at discharge as needed  - Refer to Case Management Department for coordinating discharge planning if the patient needs post-hospital services based on physician/advanced practitioner order or complex needs related to functional status, cognitive ability, or social support system  Outcome: Progressing     Problem: Knowledge Deficit  Goal: Patient/family/caregiver demonstrates understanding of disease process, treatment plan, medications, and discharge instructions  Description: Complete learning assessment and assess knowledge base.  Interventions:  - Provide teaching at level of understanding  - Provide teaching via preferred learning methods  Outcome: Progressing

## 2024-08-15 NOTE — ASSESSMENT & PLAN NOTE
Admission CIWA: 9 - Pt has a h/o chronic daily alcohol consumption but has recently been trying to cut back on the heavy liquor (vodka) and has instead been drinking 6-pack of beer for the past 5 days. He finished his last 6-pack late on 8/14/24.    ED course: Librium 50 mg x 1.     -UDS: negative  -UA w/ reflex: negative  -Ethanol 13    Improved CIWA score, back to basline after completing >48hr librium taper  Patient reports plan for outpatient Rehab, refused consult by Crisis for additional resources

## 2024-08-15 NOTE — ASSESSMENT & PLAN NOTE
Long standing h/o noncompliance with medications and repeat admissions.    Consult to case management.

## 2024-08-15 NOTE — ASSESSMENT & PLAN NOTE
Chronic, BP labile on admission.  Home medication metoprolol 50 mg twice daily.    Plan:  Continue home medication  Follow up PCP for routine monitoring

## 2024-08-15 NOTE — ASSESSMENT & PLAN NOTE
Chronic, Na on admission 129 - most likely 2/2 chronic alcohol abuse and poor PO intake.    ED course: give 1L bolus NS and started on 125 mL maintenance fluids of LR    Plan:  Repeat BMP & Mag in 1 week to be reviewed with PCP

## 2024-08-15 NOTE — ASSESSMENT & PLAN NOTE
Baseline is typically 1.0 - 1.25. POA Cr 1.79. Likely secondary to dehydration.    -Cr 1.79 >1.60     Continue to monitor labs.  Encourage PO hydration & avoidance of alcohol

## 2024-08-15 NOTE — PHYSICAL THERAPY NOTE
PHYSICAL THERAPY     08/15/24 1130   Note Type   Note type Cancelled Session   Cancel Reasons Other  (as per OT-patient deferred due to bowel issues, will re-atttempt at a later time as appropriate and agreeable.)   Licensure   [place] License Number  Heather Dacia  [id number]

## 2024-08-15 NOTE — ASSESSMENT & PLAN NOTE
Chronic, suspect 2/2 chronic alcoholism - pt reports noncompliance with home medications.    PO Mg 0.9.     Plan:  Home with MagOx daily  Repeat BMP & Mag in 1 week to be reviewed with PCP

## 2024-08-15 NOTE — ED PROVIDER NOTES
3History  Chief Complaint   Patient presents with    Chest Pain     Pt comes into ed with chest pain with SOB  on L side that started on the right with bilateral jaw pain that started after he quit drinking a few days ago. Pt took his metformin today but glucometer hasn't been working for a few months. Pt complaining of some SOB when exerting. Pt given Asprin en route. Pt states that he has stopped drinking hard alcohol and now is only drinking beer. pt has visible tremors on triage       64-year-old male past medical history significant for alcohol abuse, history of diabetes presenting to ED today for generalized weakness.  Patient states that he also has chest pain that started today.  He says that he has been cutting back hard liquor and especially beer.  He says that a day or 2 ago he had symptoms of withdrawal which she noted after because he did not drink for all day.  Anyway today he is coming in for generalized weakness.  He also has not had his glucose monitor for 1 month and so he thought that maybe it was his sugar being too low.  His sugar was normal here.  No nausea or vomiting.        Prior to Admission Medications   Prescriptions Last Dose Informant Patient Reported? Taking?   Blood Glucose Monitoring Suppl (ONE TOUCH ULTRA MINI) w/Device KIT  Self Yes No   Sig: Use as directed   Patient not taking: Reported on 3/25/2024   Blood Pressure Monitoring (Adult Blood Pressure Cuff Lg) KIT   No No   Sig: Use in the morning   Patient not taking: Reported on 3/25/2024   Breo Ellipta 200-25 MCG/ACT inhaler  Self No No   Sig: Inhale 1 puff daily Rinse mouth after use.   Patient not taking: Reported on 3/25/2024   ONETOUCH DELICA LANCETS FINE MISC  Self Yes No   Sig: 3 (three) times a day Test   Thiamine HCl (vitamin B-1) 100 MG TABS  Self No No   Sig: TAKE 1 TABLET DAILY   Patient not taking: Reported on 3/25/2024   albuterol (Ventolin HFA) 90 mcg/act inhaler  Self No No   Sig: Inhale 2 puffs every 4 (four)  hours as needed for wheezing   Patient not taking: Reported on 3/25/2024   aluminum-magnesium hydroxide-simethicone (MAALOX) 3112-4989-747 mg/30 mL suspension   No No   Sig: Take 30 mL by mouth every 4 (four) hours as needed for indigestion or heartburn   Patient not taking: Reported on 3/25/2024   apixaban (Eliquis) 5 mg   No No   Sig: TAKE 1 TABLET BY MOUTH 2 TIMES A DAY DO NOT START BEFORE JANUARY 31, 2024.   aspirin (ECOTRIN LOW STRENGTH) 81 mg EC tablet  Self Yes No   Sig: Take 81 mg by mouth daily   ferrous sulfate 325 (65 Fe) mg tablet  Self No No   Sig: Take 1 tablet (325 mg total) by mouth daily with breakfast   folic acid (FOLVITE) 1 mg tablet   No No   Sig: TAKE 1 TABLET DAILY   gabapentin (NEURONTIN) 300 mg capsule   No No   Sig: TAKE ONE CAPSULE THREE TIMES DAILY   magnesium Oxide (MAG-OX) 400 mg TABS   No No   Sig: Take 1 tablet (400 mg total) by mouth 2 (two) times a day   metFORMIN (GLUCOPHAGE) 500 mg tablet   No No   Sig: TAKE 1 TABLET DAILY WITH BREAKFAST   metoprolol tartrate (LOPRESSOR) 50 mg tablet   No No   Sig: Take 1 tablet (50 mg total) by mouth every 12 (twelve) hours   naltrexone (REVIA) 50 mg tablet   No No   Sig: Take 1 tablet (50 mg total) by mouth daily   nicotine (NICODERM CQ) 21 mg/24 hr TD 24 hr patch  Self No No   Sig: Place 1 patch on the skin over 24 hours daily Do not start before December 4, 2023.   Patient not taking: Reported on 3/25/2024   pantoprazole (PROTONIX) 40 mg tablet   No No   Sig: TAKE 1 TABLET TWO TIMES A DAY   pravastatin (PRAVACHOL) 40 mg tablet  Self No No   Sig: TAKE 1 TABLET DAILY WITH DINNER   ranolazine (RANEXA) 500 mg 12 hr tablet   No No   Sig: TAKE 1 TABLET EVERY 12 HOURS   senna-docusate sodium (SENOKOT S) 8.6-50 mg per tablet   No No   Sig: Take 1 tablet by mouth daily as needed for constipation   Patient not taking: Reported on 3/25/2024   tamsulosin (FLOMAX) 0.4 mg   No No   Sig: TAKE 1 CAPSULE DAILY   varenicline (CHANTIX) 1 mg tablet   No No   Sig:  TAKE 1 TABLET 2 TIMES A DAY      Facility-Administered Medications: None       Past Medical History:   Diagnosis Date    Acute on chronic kidney failure  (HCC)     Alcohol withdrawal (HCC) 06/07/2019    Atrial fibrillation (HCC)     Cancer (HCC)     prostate ca,had radiation    Cardiac disease     stents,then triple bypass    COPD (chronic obstructive pulmonary disease) (HCC)     Coronary artery disease     ETOH abuse     Heart failure (HCC)     History of heart surgery     says triple bypass [facility]    Hx of heart artery stent     2014    Hyperlipidemia     Hypertension     Hypovolemic shock ([facility]) 12/22/2019    Lumbar spondylitis ([facility]) 10/13/2022    Nasal bone fracture 10/10/2022    Prostate CA (HCC)     S/P CABG x 3     2004    Sleep apnea        Past Surgical History:   Procedure Laterality Date    CARDIAC CATHETERIZATION      2 stents    CORONARY ARTERY BYPASS GRAFT  2004    IN ARTHRD ANT INTERBODY MIN DSC CRV BELOW C2 N/A 12/16/2020    Procedure: Anterior cervical discectomy with fusion C4-C7; Posterior cervical decompression and fusion C2-T2;  Surgeon: David Rowell MD;  Location: [facility];  Service: Neurosurgery    TONSILLECTOMY         Family History   Problem Relation Age of Onset    Diabetes Mother     Uterine cancer Mother     COPD Father     Hypertension Father      I have reviewed and agree with the history as documented.    E-Cigarette/Vaping    E-Cigarette Use Never User      E-Cigarette/Vaping Substances    Nicotine Yes     THC No     CBD No     Flavoring No     Other No     Unknown No      Social History     Tobacco Use    Smoking status: Every Day     Current packs/day: 1.50     Average packs/day: 1.5 packs/day for 40.0 years (60.0 ttl pk-yrs)     Types: Cigarettes    Smokeless tobacco: Never   Vaping Use    Vaping status: Never Used   Substance Use Topics    Alcohol use: Yes     Alcohol/week: 4.0 standard drinks of alcohol     Types: 4 Standard drinks or equivalent per week     Comment:  quart of vodka daily    Drug use: No       Review of Systems   Constitutional:  Negative for chills and fever.   HENT:  Negative for hearing loss.    Eyes:  Negative for visual disturbance.   Respiratory:  Negative for shortness of breath.    Cardiovascular:  Negative for chest pain.   Gastrointestinal:  Negative for abdominal pain, constipation, diarrhea, nausea and vomiting.   Genitourinary:  Negative for difficulty urinating.   Musculoskeletal:  Negative for myalgias.   Skin:  Negative for rash.   Neurological:  Positive for weakness. Negative for dizziness.   Psychiatric/Behavioral:  Negative for agitation.    All other systems reviewed and are negative.      Physical Exam  Physical Exam  Vitals and nursing note reviewed.   Constitutional:       General: He is not in acute distress.     Appearance: Normal appearance. He is not ill-appearing.   HENT:      Head: Normocephalic and atraumatic.      Right Ear: External ear normal.      Left Ear: External ear normal.      Nose: Nose normal. No congestion.      Mouth/Throat:      Mouth: Mucous membranes are moist.      Pharynx: Oropharynx is clear. No oropharyngeal exudate.   Eyes:      General:         Right eye: No discharge.         Left eye: No discharge.      Extraocular Movements: Extraocular movements intact.      Conjunctiva/sclera: Conjunctivae normal.      Pupils: Pupils are equal, round, and reactive to light.   Cardiovascular:      Rate and Rhythm: Normal rate and regular rhythm.      Pulses: Normal pulses.      Heart sounds: Normal heart sounds. No murmur heard.  Pulmonary:      Effort: Pulmonary effort is normal. No respiratory distress.      Breath sounds: Normal breath sounds. No wheezing.   Abdominal:      General: Abdomen is flat. Bowel sounds are normal. There is no distension.      Palpations: Abdomen is soft.      Tenderness: There is no abdominal tenderness.   Musculoskeletal:         General: No swelling or deformity. Normal range of motion.       Cervical back: Normal range of motion. No rigidity.   Skin:     General: Skin is warm and dry.      Capillary Refill: Capillary refill takes less than 2 seconds.   Neurological:      General: No focal deficit present.      Mental Status: He is alert and oriented to person, place, and time. Mental status is at baseline.      Cranial Nerves: No cranial nerve deficit.      Motor: No weakness.      Gait: Gait normal.   Psychiatric:         Mood and Affect: Mood normal.         Behavior: Behavior normal.         Vital Signs  ED Triage Vitals   Temperature Pulse Respirations Blood Pressure SpO2   08/15/24 0018 08/15/24 0012 08/15/24 0012 08/15/24 0018 08/15/24 0014   98.2 °F (36.8 °C) 86 18 (!) 159/104 93 %      Temp Source Heart Rate Source Patient Position - Orthostatic VS BP Location FiO2 (%)   08/15/24 0018 08/15/24 0115 -- -- --   Oral Monitor         Pain Score       08/15/24 0014       4           Vitals:    08/15/24 0018 08/15/24 0034 08/15/24 0115 08/15/24 0130   BP: (!) 159/104 94/58 116/54 117/56   Pulse: 80 74 74 74         Visual Acuity  Visual Acuity      Flowsheet Row Most Recent Value   L Pupil Size (mm) 2   R Pupil Size (mm) 2            ED Medications  Medications   sodium chloride 0.9 % bolus 1,000 mL (0 mL Intravenous Stopped 8/15/24 0138)   chlordiazePOXIDE (LIBRIUM) capsule 50 mg (50 mg Oral Given 8/15/24 0044)       Diagnostic Studies  Results Reviewed       Procedure Component Value Units Date/Time    HS Troponin I 2hr [[id number]]     Lab Status: No result Specimen: Blood     HS Troponin 0hr (reflex protocol) [[id number]]  (Normal) Collected: 08/15/24 0024    Lab Status: Final result Specimen: Blood from Arm, Left Updated: 08/15/24 0139     hs TnI 0hr 39 ng/L     CBC and differential [[id number]]  (Abnormal) Collected: 08/15/24 0024    Lab Status: Final result Specimen: Blood from Arm, Left Updated: 08/15/24 0137     WBC 7.13 Thousand/uL      RBC 3.79 Million/uL      Hemoglobin 12.1 g/dL       Hematocrit 36.5 %      MCV 96 fL      MCH 31.9 pg      MCHC 33.2 g/dL      RDW 15.0 %      MPV 11.2 fL      Platelets 68 Thousands/uL      nRBC 0 /100 WBCs      Segmented % 69 %      Immature Grans % 0 %      Lymphocytes % 16 %      Monocytes % 11 %      Eosinophils Relative 3 %      Basophils Relative 1 %      Absolute Neutrophils 4.92 Thousands/µL      Absolute Immature Grans 0.03 Thousand/uL      Absolute Lymphocytes 1.13 Thousands/µL      Absolute Monocytes 0.80 Thousand/µL      Eosinophils Absolute 0.21 Thousand/µL      Basophils Absolute 0.04 Thousands/µL     Narrative:      This is an appended report.  These results have been appended to a previously verified report.    Comprehensive metabolic panel [[id number]]  (Abnormal) Collected: 08/15/24 0024    Lab Status: Final result Specimen: Blood from Arm, Left Updated: 08/15/24 0137     Sodium 129 mmol/L      Potassium 4.7 mmol/L      Chloride 95 mmol/L      CO2 20 mmol/L      ANION GAP 14 mmol/L      BUN 32 mg/dL      Creatinine 1.79 mg/dL      Glucose 67 mg/dL      Calcium 8.2 mg/dL      AST 68 U/L      ALT 62 U/L      Alkaline Phosphatase 67 U/L      Total Protein 7.0 g/dL      Albumin 4.0 g/dL      Total Bilirubin 0.63 mg/dL      eGFR 39 ml/min/1.73sq m     Narrative:      National Kid[employer] guidelines for Chronic Kidney Disease (CKD):     Stage 1 with normal or high GFR (GFR > 90 mL/min/1.73 square meters)    Stage 2 Mild CKD (GFR = 60-89 mL/min/1.73 square meters)    Stage 3A Moderate CKD (GFR = 45-59 mL/min/1.73 square meters)    Stage 3B Moderate CKD (GFR = 30-44 mL/min/1.73 square meters)    Stage 4 Severe CKD (GFR = 15-29 mL/min/1.73 square meters)    Stage 5 End Stage CKD (GFR <15 mL/min/1.73 square meters)  Note: GFR calculation is accurate only with a steady state creatinine    Fingerstick Glucose (POCT) [[id number]]  (Normal) Collected: 08/15/24 0016    Lab Status: Final result Specimen: Blood Updated: 08/15/24 0016     POC Glucose 82  mg/dl                    X-ray chest 1 view portable    (Results Pending)              Procedures  ECG 12 Lead Documentation Only    Date/Time: 8/15/2024 12:28 AM    Performed by: Seven Severino MD  Authorized by: Seven Severino MD    Indications / Diagnosis:  Chest pain  ECG reviewed by me, the ED Provider: yes    Patient location:  ED  Previous ECG:     Previous ECG:  Unavailable    Comparison to cardiac monitor: No    Interpretation:     Interpretation: normal    Rate:     ECG rate:  140    ECG rate assessment: normal    Rhythm:     Rhythm: sinus tachycardia    Ectopy:     Ectopy: none    QRS:     QRS axis:  Normal    QRS intervals:  Normal  Conduction:     Conduction: normal    ST segments:     ST segments:  Normal  T waves:     T waves: normal             ED Course            CIWA-Ar Score       Row Name 08/15/24 0034             CIWA-Ar    Pulse 74      Nausea and Vomiting 0      Tactile Disturbances 2      Tremor 3      Auditory Disturbances 0      Paroxysmal Sweats 0      Visual Disturbances 0      Anxiety 1      Headache, Fullness in Head 2      Agitation 0      Orientation and Clouding of Sensorium 0      CIWA-Ar Total 8                      HEART Risk Score      Flowsheet Row Most Recent Value   Heart Score Risk Calculator    History 0 Filed at: 08/15/2024 0153   ECG 1 Filed at: 08/15/2024 0153   Age 1 Filed at: 08/15/2024 0153   Risk Factors 1 Filed at: 08/15/2024 0153   Troponin 2 Filed at: 08/15/2024 0153   HEART Score 5 Filed at: 08/15/2024 0153                          SBIRT 22yo+      Flowsheet Row Most Recent Value   Initial Alcohol Screen: US AUDIT-C     1. How often do you have a drink containing alcohol? 6 Filed at: 08/15/2024 0020   2. How many drinks containing alcohol do you have on a typical day you are drinking?  5 Filed at: 08/15/2024 0020   3a. Male UNDER 65: How often do you have five or more drinks on one occasion? 6 Filed at: 08/15/2024 0020   Audit-C Score 17 Filed at: 08/15/2024 0020    Full Alcohol Screen: US AUDIT    4. How often during the last year have you found that you were not able to stop drinking once you had started? 1 Filed at: 08/15/2024 0020   5. How often during past year have you failed to do what was normally expected of you because of drinking?  2 Filed at: 08/15/2024 0020   6. How often in past year have you needed a first drink in the morning to get yourself going after a heavy drinking session?  3 Filed at: 08/15/2024 0020   7. How often in past year have you had feeling of guilt or remorse after drinking?  4 Filed at: 08/15/2024 0020   8. How often in past year have you been unable to remember what happened night before because you had been drinking?  3 Filed at: 08/15/2024 0020   9. Have you or someone else been injured as a result of your drinking?  0 Filed at: 08/15/2024 0020   10. Has a relative, friend, doctor or other health worker been concerned about your drinking and suggested you cut down?  4 Filed at: 08/15/2024 0020   AUDIT Total Score 34 Filed at: 08/15/2024 0020   BRIGIDA: How many times in the past year have you...    Used an illegal drug or used a prescription medication for non-medical reasons? Never Filed at: 08/15/2024 0020                      Medical Decision Making  64-year-old male presenting to the ED today with generalized weakness and chest pain.  Differential at this time includes electrolyte abnormality versus ACS versus pneumonia versus pneumothorax.  Will evaluate this time with a CBC, CMP, troponin, twelve-lead EKG, chest x-ray.  Will give patient normal saline bolus.  He is intermittently tachycardic but remained sinus tach and then goes back to normal sinus rhythm.    Patient was found to be hyponatremic with a sodium of 129.  Likely secondary to alcohol use.  Discussed case with [facility] and patient to be admitted to their service.    Amount and/or Complexity of Data Reviewed  Labs: ordered.  Radiology:  ordered.    Risk  Prescription drug management.                 Disposition  Final diagnoses:   Hyponatremia   Alcohol abuse   Chest pain     Time reflects when diagnosis was documented in both MDM as applicable and the Disposition within this note       Time User Action Codes Description Comment    8/15/2024  1:53 AM Seven Severino [E87.1] Hyponatremia     8/15/2024  1:53 AM Seven Severino [F10.10] Alcohol abuse     8/15/2024  1:53 AM Seven Severino [R07.9] Chest pain           ED Disposition       ED Disposition   Admit    Condition   Stable    Date/Time   Thu Aug 15, 2024 0202    Comment   Case was discussed with Family Practice Resident and the patient's admission status was agreed to be Admission Status: inpatient status to the service of Family Practice .               Follow-up Information    None         Patient's Medications   Discharge Prescriptions    No medications on file       No discharge procedures on file.    PDMP Review         Value Time User    PDMP Reviewed  Yes 1/24/2024  9:23 AM Tyrone Freire DO            ED Provider  Electronically Signed by             Seven Severino MD  08/15/24 0201       Seven Severino MD  08/15/24 0202

## 2024-08-15 NOTE — UTILIZATION REVIEW
Initial Clinical Review    Admission: Date/Time/Statement:   Admission Orders (From admission, onward)       Ordered        08/15/24 0210  Place in Observation  Once                          Orders Placed This Encounter   Procedures    Place in Observation     Standing Status:   Standing     Number of Occurrences:   1     Order Specific Question:   Level of Care     Answer:   Med Surg [16]     ED Arrival Information       Expected   8/15/2024     Arrival   8/15/2024 00:09    Acuity   Emergent              Means of arrival   Ambulance    Escorted by   [facility]    Admission type   Emergency              Arrival complaint   Weakness             Chief Complaint   Patient presents with    Chest Pain     Pt comes into ed with chest pain with SOB  on L side that started on the right with bilateral jaw pain that started after he quit drinking a few days ago. Pt took his metformin today but glucometer hasn't been working for a few months. Pt complaining of some SOB when exerting. Pt given Asprin en route. Pt states that he has stopped drinking hard alcohol and now is only drinking beer. pt has visible tremors on triage         Initial Presentation:   64 yom to ER from home via EMS for generalized weakness, chest pain. He says that he has been cutting back hard liquor and especially beer. He says that a day or 2 ago he had symptoms of withdrawal which she noted after because he did not drink for all day. Hx alcohol withdrawal syndrome, COPD, chronic HF with preserved EF, hypothyroidism, afib, HTN, depression, and GERD. Presents hypertensive, SOB, palpitations, diarrhea, difficulty urinating with urgency, tremors, weakness, headache. Admission CXR: Stable cardiomegaly and mild congestive changes without consolidation. Labs: PLT 68, Na 129, Cl 95, CO2 20, anion gap 14, BUN 32, Cr 1.79, Ca 8.2, Mg 0.9, ast 68, alt 62, ETOH 13. Initial CIWA score 8.  Placed in observation status for alcohol  withdrawal syndrome. Started on IVF, Librium, CIWA protocol.    8/16/24- observation:  CIWA score 5 for anxiety & tremors. Remains on Librium, CIWA protocol. IV Mg repletion given. Trial off IVF, monitor intake. Continue to monitor for s/s withdrawal, CLIFFORDWA protocol, continue Librium dosing, monitor VS, follow labs/lytes.     ED Triage Vitals   Temperature Pulse Respirations Blood Pressure SpO2 Pain Score   08/15/24 0018 08/15/24 0012 08/15/24 0012 08/15/24 0018 08/15/24 0014 08/15/24 0014   98.2 °F (36.8 °C) 86 18 (!) 159/104 93 % 4     Weight (last 2 days)       Date/Time Weight    08/16/24 0545 85.7 (189)    08/15/24 0553 91.4 (201.5)    08/15/24 0329 91.4 (201.5)            Vital Signs (last 3 days)       Date/Time Temp Pulse Resp BP MAP (mmHg) SpO2 O2 Flow Rate (L/min) O2 Device [place] Coma Scale Score WA-Ar Total Pain    08/16/24 1200 -- -- -- 134/65 -- -- -- -- -- 5 --    08/16/24 11:31:16 97.6 °F (36.4 °C) 74 15 -- 88 95 % -- -- -- -- --    08/16/24 1103 -- -- -- -- -- 96 % 0 L/min None (Room air) -- -- --    08/16/24 1045 -- -- -- -- -- -- -- -- -- -- No Pain    08/16/24 08:01:19 -- 71 -- 136/63 87 94 % -- -- -- -- --    08/16/24 0800 -- -- -- -- -- -- -- -- -- 4 --    08/16/24 06:16:17 -- 75 -- 102/42 62 91 % -- -- -- -- --    08/16/24 0445 -- -- -- -- -- -- -- -- -- 5 --    08/16/24 03:55:46 -- 75 -- 144/83 103 94 % -- -- -- -- --    08/16/24 03:47:33 97.9 °F (36.6 °C) 130 -- 206/140 162 97 % -- -- -- -- --    08/16/24 0334 -- -- -- -- -- -- -- -- -- 9 --    08/16/24 02:59:35 97.6 °F (36.4 °C) 76 16 125/69 88 96 % -- -- -- -- --    08/16/24 0230 -- -- -- -- -- -- -- -- -- 6 --    08/15/24 23:30:03 97.8 °F (36.6 °C) 78 16 123/67 86 95 % -- -- -- -- --    08/15/24 2230 -- -- -- -- -- -- -- -- -- 3 --    08/15/24 2207 -- 74 -- 123/66 85 93 % -- -- -- -- --    08/15/24 1945 -- -- -- -- -- -- -- None (Room air) 15 -- No Pain    08/15/24 19:43:55 97.5 °F (36.4 °C) 71 -- 108/52 71 95 % -- -- -- -- --     08/15/24 1830 -- -- -- 108/54 -- -- -- -- -- 3 --    08/15/24 15:13:34 97.9 °F (36.6 °C) 78 -- 109/56 74 95 % -- -- -- -- --    08/15/24 1430 -- -- -- 102/46 -- -- -- -- -- 3 --    08/15/24 1030 -- -- -- 114/77 -- -- -- -- -- 4 --    08/15/24 0900 -- -- -- -- -- -- -- -- 15 -- 3    08/15/24 07:38:48 97.6 °F (36.4 °C) 82 -- 118/63 81 92 % -- -- -- -- --    08/15/24 0730 -- -- -- 118/63 -- -- -- -- -- 4 --    08/15/24 05:06:44 -- 79 -- 113/61 78 96 % -- -- -- -- --    08/15/24 0338 -- -- -- -- -- -- -- -- 15 -- 3    08/15/24 0330 -- -- -- -- -- -- -- -- -- 4 --    08/15/24 0329 -- -- -- -- -- -- -- -- -- -- No Pain    08/15/24 03:20:53 97.4 °F (36.3 °C) -- -- 154/118 130 -- -- -- -- -- --    08/15/24 0253 -- -- -- -- -- 96 % -- None (Room air) -- -- --    08/15/24 0245 -- 74 20 103/59 77 97 % -- -- -- -- --    08/15/24 0215 -- 78 -- 116/56 81 96 % -- -- -- -- --    08/15/24 0145 -- 88 18 106/51 74 96 % -- None (Room air) -- -- --    08/15/24 0130 -- 74 20 117/56 81 97 % -- -- -- -- --    08/15/24 0115 -- 74 18 116/54 78 96 % -- -- -- -- --    08/15/24 0046 -- -- -- -- -- -- -- -- 15 -- 4    08/15/24 0034 -- 74 -- 94/58 -- -- -- -- -- 8 --    08/15/24 0018 98.2 °F (36.8 °C) 80 -- 159/104 -- -- -- -- -- -- --    08/15/24 0014 -- 142 18 -- -- 93 % -- -- -- -- 4    08/15/24 0012 -- 86 18 -- -- -- -- -- -- -- --           CIWA-Ar Score       Row Name 08/16/24 1200 08/16/24 11:31:16 08/16/24 0800       CIWA-Ar    /65 -- --    Nausea and Vomiting 0 -- 0    Tactile Disturbances 0 -- 0    Tremor 2 -- 2    Auditory Disturbances 0 -- 0    Paroxysmal Sweats 0 -- 0    Visual Disturbances 0 -- 0    Anxiety 3 -- 2    Headache, Fullness in Head 0 -- 0    Agitation 0 -- 0    Orientation and Clouding of Sensorium 0 -- 0    CIWA-Ar Total 5 -- 4      Row Name 08/16/24 0445 08/16/24 0334 08/16/24 0230       CIWA-Ar    Nausea and Vomiting 0 0 0    Tactile Disturbances 0 0 0    Tremor 2 5 5    Auditory Disturbances 0 0 0    Paroxysmal  Sweats 0 1 0    Visual Disturbances 0 0 0    Anxiety 3 3 1    Headache, Fullness in Head 0 0 0    Agitation 0 0 0    Orientation and Clouding of Sensorium 0 0 0    CIWA-Ar Total 5 9 6      Row Name 08/15/24 2230 08/15/24 1830 08/15/24 1430       CIWA-Ar    BP -- 108/54 102/46    Nausea and Vomiting 0 0 0    Tactile Disturbances 0 0 0    Tremor 2 3 3    Auditory Disturbances 0 0 0    Paroxysmal Sweats 1 0 0    Visual Disturbances 0 0 0    Anxiety 0 0 0    Headache, Fullness in Head 0 0 0    Agitation 0 0 0    Orientation and Clouding of Sensorium 0 0 0    CIWA-Ar Total 3 3 3      Row Name 08/15/24 1030 08/15/24 0730 08/15/24 0330       CIWA-Ar    /77 118/63 --    Nausea and Vomiting 0 0 0    Tactile Disturbances 0 0 0    Tremor 4 4 4    Auditory Disturbances 0 0 0    Paroxysmal Sweats 0 0 0    Visual Disturbances 0 0 0    Anxiety 0 0 0    Headache, Fullness in Head 0 0 0    Agitation 0 0 0    Orientation and Clouding of Sensorium 0 0 0    CIWA-Ar Total 4 4 4      Row Name 08/15/24 0034             CIWA-Ar    Pulse 74      Nausea and Vomiting 0      Tactile Disturbances 2      Tremor 3      Auditory Disturbances 0      Paroxysmal Sweats 0      Visual Disturbances 0      Anxiety 1      Headache, Fullness in Head 2      Agitation 0      Orientation and Clouding of Sensorium 0      CIWA-Ar Total 8                      Pertinent Labs/Diagnostic Test Results:   Radiology:  X-ray chest 1 view portable   Final Interpretation by Edgar Sanchez MD (08/15 0820)   Stable cardiomegaly and mild congestive changes without consolidation.         Workstation performed: [id number]           Cardiology:  8/15 EKG=  Interpretation:     Interpretation: normal    Rate:     ECG rate:  140     ECG rate assessment: normal    Rhythm:     Rhythm: sinus tachycardia    Ectopy:     Ectopy: none    QRS:     QRS axis:  Normal     QRS intervals:  Normal   Conduction:     Conduction: normal    ST segments:     ST segments:  Normal   T waves:     T  waves: normal         Results from last 7 days   Lab Units 08/15/24  0240   SARS-COV-2  Negative     Results from last 7 days   Lab Units 08/16/24  0427 08/15/24  1746 08/15/24  0429 08/15/24  0024   WBC Thousand/uL 4.50 4.46 4.92 7.13   HEMOGLOBIN g/dL 10.1* 10.5* 9.7* 12.1   HEMATOCRIT % 31.2* 31.4* 29.1* 36.5   PLATELETS Thousands/uL 61* 57* 52* 68*   TOTAL NEUT ABS Thousands/µL 2.45 2.88 2.80 4.92     Results from last 7 days   Lab Units 08/16/24  0427 08/15/24  1746 08/15/24  1424 08/15/24  0429 08/15/24  0229 08/15/24  0024   SODIUM mmol/L 136 134*  --  131*  --  129*   POTASSIUM mmol/L 4.5 4.1  --  3.9  --  4.7   CHLORIDE mmol/L 105 103  --  99  --  95*   CO2 mmol/L 24 23  --  21  --  20*   ANION GAP mmol/L 7 8  --  11  --  14*   BUN mg/dL 29* 29*  --  30*  --  32*   CREATININE mg/dL 1.16 1.23  --  1.60*  --  1.79*   EGFR ml/min/1.73sq m 67 62  --  45  --  39   CALCIUM mg/dL 8.3* 8.3*  --  7.5*  --  8.2*   MAGNESIUM mg/dL 1.8*  --  1.8*  --  0.9*  --      Results from last 7 days   Lab Units 08/16/24  0427 08/15/24  0429 08/15/24  0024   AST U/L 29 45* 68*   ALT U/L 36 47 62*   ALK PHOS U/L 54 56 67   TOTAL PROTEIN g/dL 4.9* 5.5* 7.0   ALBUMIN g/dL 3.4* 3.3* 4.0   TOTAL BILIRUBIN mg/dL 0.41 0.49 0.63     Results from last 7 days   Lab Units 08/16/24  1132 08/16/24  0732 08/15/24  2007 08/15/24  1604 08/15/24  1118 08/15/24  0722 08/15/24  0016   POC GLUCOSE mg/dl 139 105 157* 289* 120 124 82     Results from last 7 days   Lab Units 08/16/24  0427 08/15/24  1746 08/15/24  0429 08/15/24  0024   GLUCOSE RANDOM mg/dL 100 123 91 67     Results from last 7 days   Lab Units 08/15/24  0430   HEMOGLOBIN A1C % 5.4   EAG mg/dl 108     BETA-HYDROXYBUTYRATE   Date Value Ref Range Status   01/22/2024 3.2 (H) <0.6 mmol/L Final      Results from last 7 days   Lab Units 08/15/24  0430 08/15/24  0229 08/15/24  0024   HS TNI 0HR ng/L  --   --  39   HS TNI 2HR ng/L  --  33  --    HSTNI D2 ng/L  --  -6  --    HS TNI 4HR ng/L 30   --   --    HSTNI D4 ng/L -9  --   --      Results from last 7 days   Lab Units 08/15/24  0229   CLARITY UA  Clear   COLOR UA  Yellow   SPEC GRAV UA  1.010   PH UA  5.5   GLUCOSE UA mg/dl Negative   KETONES UA mg/dl Negative   BLOOD UA  Negative   PROTEIN UA mg/dl Negative   NITRITE UA  Negative   BILIRUBIN UA  Negative   UROBILINOGEN UA (BE) mg/dl 2.0*   LEUKOCYTES UA  Negative     Results from last 7 days   Lab Units 08/15/24  0240   INFLUENZA A PCR  Negative   INFLUENZA B PCR  Negative   RSV PCR  Negative     Results from last 7 days   Lab Units 08/15/24  0229   AMPH/METH  Negative   BARBITURATE UR  Negative   BENZODIAZEPINE UR  Negative   COCAINE UR  Negative   METHADONE URINE  Negative   OPIATE UR  Negative   PCP UR  Negative   THC UR  Negative     Results from last 7 days   Lab Units 08/15/24  0229   ETHANOL LVL mg/dL 13*       ED Treatment-Medication Administration from 08/15/2024 0009 to 08/15/2024 0307         Date/Time Order Dose Route Action     08/15/2024 0032 sodium chloride 0.9 % bolus 1,000 mL 1,000 mL Intravenous New Bag     08/15/2024 0044 chlordiazePOXIDE (LIBRIUM) capsule 50 mg 50 mg Oral Given     08/15/2024 0234 lactated ringers infusion 125 mL/hr Intravenous New Bag            Past Medical History:   Diagnosis Date    Acute on chronic kidney failure  (HCC)     Alcohol withdrawal (HCC) 06/07/2019    Atrial fibrillation (HCC)     Cancer (HCC)     prostate ca,had radiation    Cardiac disease     stents,then triple bypass    COPD (chronic obstructive pulmonary disease) (HCC)     Coronary artery disease     ETOH abuse     Heart failure (HCC)     History of heart surgery     [facility] triple bypass [facility]    Hx of heart artery stent     2014    Hyperlipidemia     Hypertension     Hyponatremia 10/17/2019    Hypovolemic shock (HCC) 12/22/2019    Lumbar spondylitis (HCC) 10/13/2022    Metabolic acidosis, increased anion gap 04/21/2021    Nasal bone fracture 10/10/2022    Prostate CA (HCC)     S/P  CABG x 3     2004    Sleep apnea      Present on Admission:   COPD (chronic obstructive pulmonary disease) (HCC)   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 disease (HCC)   Hypomagnesemia   Alcohol withdrawal syndrome with complication (HCC)   (Resolved) Hyponatremia   Hypertension   A-fib (HCC)   Chronic heart failure with preserved ejection fraction (HCC)   Thrombocytopenia (HCC)   Fatty liver, alcoholic   ENMA (acute kidney injury) (HCC)   (Resolved) Metabolic acidosis, increased anion gap      Admitting Diagnosis: Alcohol abuse [F10.10]  Hyponatremia [E87.1]  Chest pain [R07.9]  Cervical spinal stenosis [M48.02]  Age/Sex: 64 y.o. male  Admission Orders:  Cont pulse ox  Aspiration precautions  Seizure precautions  Pt/ot eval & tx  CIWA protocol  Scd  Accuchecks  Telemetry    Scheduled Medications:  Medications 08/07 08/08 08/09 08/10 08/11 08/12 08/13 08/14 08/15 08/16   apixaban (ELIQUIS) tablet 5 mg  Dose: 5 mg  Freq: 2 times daily Route: PO  Start: 08/15/24 0900   Admin Instructions:      Order specific questions:               0955     1725      0900     1800        aspirin chewable tablet 81 mg  Dose: 81 mg  Freq: Daily Route: PO  Start: 08/15/24 0900 End: 08/15/24 0322            0322-D/C'd        chlordiazePOXIDE (LIBRIUM) capsule 50 mg  Dose: 50 mg  Freq: Every 6 hours scheduled Route: PO  Start: 08/15/24 1200 End: 08/16/24 0018   Admin Instructions:               1200     1725      0018        Followed by   chlordiazePOXIDE (LIBRIUM) capsule 50 mg  Dose: 50 mg  Freq: Every 8 hours scheduled Route: PO  Start: 08/16/24 0600 End: 08/17/24 0559   Admin Instructions:                0616     1400     2200        chlordiazePOXIDE (LIBRIUM) capsule 50 mg  Dose: 50 mg  Freq: Every 6 hours scheduled Route: PO  Start: 08/15/24 0600 End: 08/15/24 1011   Admin Instructions:               0512     1011-D/C'd       chlordiazePOXIDE (LIBRIUM) capsule 50 mg  Dose: 50 mg  Freq: Once Route: PO  Start: 08/15/24  0045 End: 08/15/24 0044   Admin Instructions:               0044         ferrous sulfate tablet 325 mg  Dose: 325 mg  Freq: Daily with breakfast Route: PO  Start: 08/15/24 0730   Admin Instructions:               0954      0730        fluticasone-vilanterol 200-25 mcg/actuation 1 puff  Dose: 1 puff  Freq: Daily Route: IN  Start: 08/15/24 0900   Admin Instructions:               0958      0900        folic acid (FOLVITE) tablet 1,000 mcg  Dose: 1,000 mcg  Freq: Daily Route: PO  Start: 08/15/24 0900            0955      0900        gabapentin (NEURONTIN) capsule 300 mg  Dose: 300 mg  Freq: 3 times daily Route: PO  Start: 08/16/24 0415   Admin Instructions:                0420     0900     1600     2100         insulin lispro (HumALOG/ADMELOG) 100 units/mL subcutaneous injection 1-6 Units  Dose: 1-6 Units  Freq: 4 times daily (before meals and at bedtime) Route: SC  Start: 08/15/24 0700   Admin Instructions:               (7909) (3266)     1722     2205 [C]      0700     1130     1600     2200        lactated ringers bolus 1,000 mL  Dose: 1,000 mL  Freq: Once Route: IV  Last Dose: 1,000 mL (08/15/24 0346)  Start: 08/15/24 0230 End: 08/15/24 0546            0346 [C]         loperamide (IMODIUM) capsule 2 mg  Dose: 2 mg  Freq: Once Route: PO  Indications of Use: DIARRHEA  Start: 08/15/24 1900 End: 08/15/24 2023   Admin Instructions:               2023-D/C'd  (2039)         LORazepam (ATIVAN) tablet 2 mg  Dose: 2 mg  Freq: Once Route: PO  Indications of Use: ALCOHOL WITHDRAWAL SYNDROME  Start: 08/16/24 0345   Admin Instructions:                0412 [C]        magnesium Oxide (MAG-OX) tablet 400 mg  Dose: 400 mg  Freq: 2 times daily Route: PO  Start: 08/15/24 0900            0955     1726      0900     1800        magnesium sulfate 2 g/50 mL IVPB (premix) 2 g  Dose: 2 g  Freq: Once Route: IV  Last Dose: 2 g (08/15/24 1825)  Start: 08/15/24 1815 End: 08/15/24 2025   Admin Instructions:               1825          magnesium sulfate 2 g/50 mL IVPB (premix) 2 g  Dose: 2 g  Freq: Once Route: IV  Last Dose: 2 g (08/15/24 0921)  Start: 08/15/24 0915 End: 08/15/24 1121   Admin Instructions:               0921         magnesium sulfate 2 g/50 mL IVPB (premix) 2 g  Dose: 2 g  Freq: Every 2 hours scheduled Route: IV  Start: 08/15/24 0430 End: 08/15/24 0420   Admin Instructions:               0420-D/C'd       magnesium sulfate 2 g/50 mL IVPB (premix) 2 g  Dose: 2 g  Freq: Every 2 hours scheduled Route: IV  Last Dose: 2 g (08/15/24 0628)  Start: 08/15/24 0430 End: 08/15/24 0828   Admin Instructions:               0439     0628         magnesium sulfate 2 g/50 mL IVPB (premix) 2 g  Dose: 2 g  Freq: Every 2 hours scheduled Route: IV  Start: 08/15/24 0415 End: 08/15/24 0415   Admin Instructions:               0415-D/C'd  (0507)         magnesium sulfate 4 g/100 mL IVPB (premix) 4 g  Dose: 4 g  Freq: Once Route: IV  Start: 08/15/24 0430 End: 08/15/24 0418   Admin Instructions:               0418-D/C'd       magnesium sulfate 4 g/100 mL IVPB (premix) 4 g  Dose: 4 g  Freq: Once Route: IV  Start: 08/15/24 0415 End: 08/15/24 0405   Admin Instructions:               0405-D/C'd       metoprolol tartrate (LOPRESSOR) tablet 50 mg  Dose: 50 mg  Freq: Every 12 hours scheduled Route: PO  Start: 08/15/24 0900   Admin Instructions:      Order specific questions:               0930     2207 [C]      0900     2100        nicotine (NICODERM CQ) 14 mg/24hr TD 24 hr patch 1 patch  Dose: 1 patch  Freq: Daily Route: TD  Start: 08/15/24 0900   Admin Instructions:               0955      0859     0900        pantoprazole (PROTONIX) EC tablet 40 mg  Dose: 40 mg  Freq: 2 times daily before meals Route: PO  Start: 08/15/24 0700   Admin Instructions:               0628     1726      0616     1600        pravastatin (PRAVACHOL) tablet 40 mg  Dose: 40 mg  Freq: Daily with dinner Route: PO  Start: 08/15/24 1630            1725      1630        ranolazine (RANEXA) 12  hr tablet 500 mg  Dose: 500 mg  Freq: Every 12 hours scheduled Route: PO  Start: 08/15/24 0900   Admin Instructions:               0954     2207      0900     2100        sodium chloride 0.9 % bolus 1,000 mL  Dose: 1,000 mL  Freq: Once Route: IV  Last Dose: Stopped (08/15/24 0138)  Start: 08/15/24 0030 End: 08/15/24 0138            0032     0138         tamsulosin (FLOMAX) capsule 0.4 mg  Dose: 0.4 mg  Freq: Daily Route: PO  Start: 08/15/24 0900   Admin Instructions:               0954      0900        thiamine tablet 100 mg  Dose: 100 mg  Freq: Daily Route: PO  Start: 08/15/24 0900            0955      0900                    Continuous Meds Sorted by Name  for Tanyajann Gregg as of 08/07/24 through 8/16/24  Legend:       Medications 08/07 08/08 08/09 08/10 08/11 08/12 08/13 08/14 08/15 08/16   lactated ringers infusion  Rate: 75 mL/hr Dose: 75 mL/hr  Freq: Continuous Route: IV  Last Dose: 75 mL/hr (08/15/24 1825)  Start: 08/15/24 0230            0234     0345 [C]     2418 9725                     PRN Meds Sorted by Name  for Tanyajann Gregg as of 08/07/24 through 8/16/24  Legend:       Medications 08/07 08/08 08/09 08/10 08/11 08/12 08/13 08/14 08/15 08/16   aluminum-magnesium hydroxide-simethicone (MAALOX) oral suspension 30 mL  Dose: 30 mL  Freq: Every 4 hours PRN Route: PO  PRN Reasons: indigestion,heartburn  Start: 08/15/24 0433   Admin Instructions:               0512         loperamide (IMODIUM) capsule 2 mg  Dose: 2 mg  Freq: Every 4 hours PRN Route: PO  PRN Reason: diarrhea  Start: 08/15/24 2059   Admin Instructions:                   ondansetron (ZOFRAN) injection 4 mg  Dose: 4 mg  Freq: Every 6 hours PRN Route: IV  PRN Reasons: nausea,vomiting  Start: 08/15/24 0300   Admin Instructions:                   polyethylene glycol (MIRALAX) packet 17 g  Dose: 17 g  Freq: Daily PRN Route: PO  PRN Reason: constipation  Start: 08/15/24 0322   Admin Instructions:                                 Network  Utilization Review Department  ATTENTION: Please call with any questions or concerns to [phone] and carefully listen to the prompts so that you are directed to the right person. All voicemails are confidential.   For Discharge needs, contact Care Management DC Support Team at [phone] opt. 2  Send all requests for admission clinical reviews, approved or denied determinations and any other requests to dedicated fax number below belonging to the campus where the patient is receiving treatment. List of dedicated fax numbers for the Facilities:  FACILITY NAME UR FAX NUMBER   ADMISSION DENIALS (Administrative/Medical Necessity) [phone]   DISCHARGE SUPPORT TEAM (NETWORK) [phone]   PARENT CHILD HEALTH (Maternity/NICU/Pediatrics) [phone]   [facility] [phone]   [facility] [phone]   [facility] [phone]   [facility] [phone]   [facility] [phone]   [facility] [phone]   [facility] [phone]   [facility] [phone]   [facility] [phone]   [facility] [phone]   [facility] [phone]   [facility] [phone]

## 2024-08-15 NOTE — ASSESSMENT & PLAN NOTE
Lab Results   Component Value Date    HGBA1C 5.4 01/24/2024     Chronic, stable     Resume home meds  Educated on appropriate lifestyle modifications

## 2024-08-15 NOTE — OCCUPATIONAL THERAPY NOTE
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)    Hypomagnesemia    Hx of CABG    History of prostate cancer    Thrombocytopenia (HCC)    Hyponatremia    Chronic heart failure with preserved ejection fraction (HCC)    Noncompliance    A-fib (HCC)    Metabolic acidosis, increased anion gap    Fatty liver, alcoholic    ENMA (acute kidney injury) (HCC)    Hypertension    Past Medical History  Past Medical History:   Diagnosis Date    Acute on chronic kidney failure  (HCC)     Alcohol withdrawal (HCC) 06/07/2019    Atrial fibrillation (HCC)     Cancer (HCC)     prostate ca,had radiation    Cardiac disease     stents,then triple bypass    COPD (chronic obstructive pulmonary disease) (HCC)     Coronary artery disease     ETOH abuse     Heart failure (HCC)     History of heart surgery     says [facility]    Hx of heart artery stent     2014    Hyperlipidemia     Hypertension     Hypovolemic shock (HCC) 12/22/2019    Lumbar spondylitis (HCC) 10/13/2022    Nasal bone fracture 10/10/2022    Prostate CA (HCC)     S/P CABG x 3     2004    Sleep apnea      Past Surgical History  Past Surgical History:   Procedure Laterality Date    CARDIAC CATHETERIZATION      2 stents    CORONARY ARTERY BYPASS GRAFT  2004    GA ARTHRD ANT INTERBODY MIN DSC CRV BELOW C2 N/A 12/16/2020    Procedure: Anterior cervical discectomy with fusion C4-C7; Posterior cervical decompression and fusion C2-T2;  Surgeon: David Rowell MD;  [facility];  Service: Neurosurgery    TONSILLECTOMY          08/15/24 1029   Note Type   Note type Cancelled Session  (Thursday 8/15/24)   Additional Comments Identified pt by full name and birthdate. Contact  "made w/ pt from [phone]. Pt declined participation reporting he has diarrhea and is waiting for his diet to be changed.   Home Living   Type of Home Apartment;Other (Comment)  (elevator accessible apt in 6 floor building)   Home Layout One level;Performs ADLs on one level;Able to live on main level with bedroom/bathroom;Elevator  (no KAREN)   Bathroom Shower/Tub Tub/shower unit   Bathroom Accessibility Accessible   Home Equipment Walker;Cane   Additional Comments Pt reports living alone in elevator accessible apt   Prior Function   Level of [place] Independent with ADLs;Independent with functional mobility;Needs assistance with IADLS  (pt does not drive)   Lives With (S)  Alone   Receives Help From Family;Friend(s)   IADLs Family/Friend/Other provides transportation   Falls in the last 6 months 5 to 10   Vocational Retired   Comments Pt reports I w/ ADLs using walker recently. 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

## 2024-08-15 NOTE — ASSESSMENT & PLAN NOTE
Chronic, stable. No PFT documented.    Continue home Breo inhaler  Advised to f/u outpatient pulm for PFT.

## 2024-08-15 NOTE — ASSESSMENT & PLAN NOTE
Chronic. EKG on admission: sinus tachycardia. Pt reports he was not taking home eliquis or metoprolol regularly. CHADVASC 3.     Continue home beta blocker and eliquis.

## 2024-08-15 NOTE — ASSESSMENT & PLAN NOTE
Anion gap on admission: 14. BUN 32 Cr 1.79 - UA negative for ketones. Pt reports poor PO intake and has been consistently drinking 6-packs of beer for the past 5 days.    ED course: received 1L NS bolus.    Continue IV hydration 75 mL LR.  Recheck labs in the morning  Continue diabetic diet

## 2024-08-15 NOTE — ASSESSMENT & PLAN NOTE
Acute on chronic, plts on admission 68. Baseline approximately 100 most likely 2/2 alcohol abuse and h/o eliquis and ASA.    Repeat CBC in 1 week to be reviewed with PCP   Other

## 2024-08-15 NOTE — ASSESSMENT & PLAN NOTE
Noted on previous CT. Suspect 2/2 alcohol abuse. On admission AST: 68 and ALT: 62.    Encourage alcohol cessation

## 2024-08-15 NOTE — ASSESSMENT & PLAN NOTE
Wt Readings from Last 3 Encounters:   08/15/24 91.4 kg (201 lb 8 oz)   06/04/24 91.2 kg (201 lb)   05/23/24 85.7 kg (189 lb)     Previous echo on 9/23: normal systolic function, EF 60%, diastolic HF grade 2. Pt does not appear to be in acute exacerbation.    Continue home medications

## 2024-08-15 NOTE — H&P
History and Physical - [facility]  Family Medicine Residency     Patient Information: Gregg Partida 64 y.o. male MRN: [id number]  Unit/Bed#: [address] [place] [address] Encounter: [id number]  Admitting Physician: Ayla Colindres MD   PCP: Lilliana Bliss MD  Date of Admission:  08/15/24     Assessment and Plan     * Alcohol withdrawal syndrome with complication (HCC)  Assessment & Plan  Admission CIWA: 9 - Pt has a h/o chronic daily alcohol consumption but has recently been trying to cut back on the heavy liquor (vodka) and has instead been drinking 6-pack of beer for the past 5 days. He finished his last 6-pack late on 8/14/24.    ED course: Librium 50 mg x 1.     UDS: negative  UA w/ reflex: negative    Plan:  - Monitor CIWA score  - Continue Librium 50 mg q6h for 24 hr  - Consult case management  - Continue seizure precautions    Hypertension  Assessment & Plan  Chronic, BP labile on admission. Not on any home medications with exception of metoprolol.    Plan:  - Continue to monitor BP during admission.    Elevated serum creatinine  Assessment & Plan  Baseline is typically 1.0 - 1.25. Cr on admission 1.79.    Plan:  - Continue to monitor labs.  - Continue to hydrate as appropriate.  - Renally dose medications.    Fatty liver, alcoholic  Assessment & Plan  Noted on previous CT. Suspect 2/2 alcohol abuse. On admission AST: 68 and ALT: 62.    Plan:  - Monitor LFTs.  - Hepatically dose medications as able.    Metabolic acidosis, increased anion gap  Assessment & Plan  Anion gap on admission: 14. BUN 32 Cr 1.79 - UA negative for ketones. Pt reports poor PO intake and has been consistently drinking 6-packs of beer for the past 5 days.    ED course: received 1L NS bolus.    Plan:  - Continue IV hydration 125 mL LR.  - Recheck labs in the morning.  - Pt currently on diabetic diet level 2.    A-fib (HCC)  Assessment & Plan  Chronic. EKG on admission: sinus tachycardia. Pt reports he was not taking home eliquis or  metoprolol regularly. CHADVASC 3.     Plan:  - Continue home beta blocker and eliquis.  - Continue rate control.    Noncompliance  Assessment & Plan  Long standing h/o noncompliance with medications and repeat admissions.    Plan:  - Consult to case management.    Chronic heart failure with preserved ejection fraction (HCC)  Assessment & Plan  Wt Readings from Last 3 Encounters:   08/15/24 91.4 kg (201 lb 8 oz)   06/04/24 91.2 kg (201 lb)   05/23/24 85.7 kg (189 lb)     Previous echo on 9/23: normal systolic function, EF 60%, diastolic HF grade 2. Pt does not appear to be in acute exacerbation.    Plan:  - Monitor daily weights   - Monitor I/O  - Continue home medications    Hyponatremia  Assessment & Plan  Chronic, Na on admission 129 - most likely 2/2 chronic alcohol abuse and poor PO intake.    ED course: give 1L bolus NS and started on 125 mL maintenance fluids of LR    Plan:  - 1L bolus LR given  - Continue LR maintenance fluids  - Replete as indicated    Thrombocytopenia (HCC)  Assessment & Plan  Acute on chronic, plts on admission 68. Baseline approximately 100 most likely 2/2 alcohol abuse and h/o eliquis and ASA.      Plan:  - Continue eliquis  - Hold ASA  - Monitor plt count daily    History of prostate cancer  Assessment & Plan  Chronic, stable.   Plan:  - Continue to monitor urinary symptoms (decreased stream, pressure with urination)  - Monitor I/O  - Urinary retention protocol ordered    Dyslipidemia  Assessment & Plan  Chronic, stable.    Plan:  - Continue home medications    Hx of CABG  Assessment & Plan  Chronic, stable. Troponin on admission 39. 2 hr Trop 33.    Plan:  - Holding home ASA 2/2 thrombocytopenia      Hypomagnesemia  Assessment & Plan  Chronic, suspect 2/2 chronic alcoholism - pt reports noncompliance with home medications.    Critically low on admission: 0.9    Plan:  - Resume home Mg  - Replete as indicated    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 disease  ([facility])  Assessment & Plan  Lab Results   Component Value Date    HGBA1C 5.4 01/24/2024     Chronic, stable     Plan:  - Hold metformin.  - Start insulin sliding scale  - Pending A1C  - Continue carb control diet  - Monitor for hypoglycemia    COPD (chronic obstructive pulmonary disease) ([facility])  Assessment & Plan  Chronic, stable. No PFT documented.    Plan:  - Continue home Breo inhaler  - Recommend OP PFT.       Fluids: Lactated Ringers  Electrolyte repletion: Replete Mg now, and as needed.  Nutrition:        Diet Orders   (From admission, onward)                 Start     Ordered    08/15/24 0254  Diet Vlad/CHO Controlled; Consistent Carbohydrate Diet Level 2 (5 carb servings/75 grams CHO/meal)  Diet effective now        References:    Adult Nutrition Support Algorithm    RD Therapeutic Diet Order Protocol   Question Answer Comment   Diet Type Vlad/CHO Controlled    Vlad/CHO Controlled Consistent Carbohydrate Diet Level 2 (5 carb servings/75 grams CHO/meal)    RD to adjust diet per protocol? Yes        08/15/24 0255                 Diabetic Diet  VTE Prophylaxis: VTE Score: 5 Moderate Risk (Score 3-4) - Pharmacological DVT Prophylaxis Ordered: apixaban (Eliquis).  Code Status: Level 1 - Full Code  Anticipated Length of Stay:  Patient will be admitted on an Observation basis with an anticipated length of stay of less than 2 midnights.     Justification for Hospital Stay: alcohol withdrawal  Total Time for Visit, including Counseling / Coordination of Care: >30 mins. Greater than 50% of this total time spent on direct patient counseling and coordination of care.  Patient Information Sharing: With the consent of Gregg Partida, their loved ones  were notified today by inpatient team of the patient’s condition and current plan. All questions answered.    Chief Complaint:     Chief Complaint   Patient presents with    Chest Pain     Pt comes into ed with chest pain with SOB  on L side that started on the right with  bilateral jaw pain that started after he quit drinking a few days ago. Pt took his metformin today but glucometer hasn't been working for a few months. Pt complaining of some SOB when exerting. Pt given Asprin en route. Pt states that he has stopped drinking hard alcohol and now is only drinking beer. pt has visible tremors on triage         Subjective      History of Present Illness:     Gregg Partida is a 64 y.o. male with a pmhx of alcohol withdrawal syndrome, COPD, chronic HF with preserved EF, hypothyroidism, afib, HTN, depression, and GERD who presents for symptoms of alcohol withdrawal. Starting 5 days ago pt decided to cut down on vodka consumption and began drinking 6-packs of beer instead. He has since been feeling weak, SOB with exertion,diarrhea, difficulty urinating, and frequent h/a. Pt admits to having a chronic history of urinary symptoms including difficulty urinating, pressure with initiating stream, and small stream. He denies N/V, constipation, fever, chills.     Review of Systems:  Review of Systems   Constitutional:  Positive for appetite change and fatigue. Negative for chills and fever.   HENT:  Negative for hearing loss and sore throat.    Respiratory:  Positive for shortness of breath. Negative for chest tightness.    Cardiovascular:  Positive for palpitations. Negative for chest pain.   Gastrointestinal:  Positive for diarrhea. Negative for abdominal pain, constipation, nausea and vomiting.   Genitourinary:  Positive for difficulty urinating and urgency.   Neurological:  Positive for tremors, weakness and headaches. Negative for numbness.        Past Medical History:   Diagnosis Date    Acute on chronic kidney failure  (HCC)     Alcohol withdrawal (HCC) 06/07/2019    Atrial fibrillation (HCC)     Cancer (HCC)     prostate ca,had radiation    Cardiac disease     stents,then triple bypass    COPD (chronic obstructive pulmonary disease) (HCC)     Coronary artery disease     ETOH abuse      Heart failure ([facility])     History of heart surgery     says triple bypass [facility]    Hx of heart artery stent     2014    Hyperlipidemia     Hypertension     Hypovolemic shock ([facility]) 12/22/2019    Lumbar spondylitis ([facility]) 10/13/2022    Nasal bone fracture 10/10/2022    Prostate CA ([facility])     S/P CABG x 3     2004    Sleep apnea      Past Surgical History:   Procedure Laterality Date    CARDIAC CATHETERIZATION      2 stents    CORONARY ARTERY BYPASS GRAFT  2004    AR ARTHRD ANT INTERBODY MIN DSC CRV BELOW C2 N/A 12/16/2020    Procedure: Anterior cervical discectomy with fusion C4-C7; Posterior cervical decompression and fusion C2-T2;  Surgeon: David Rowell MD;  Location[facility];  Service: Neurosurgery    TONSILLECTOMY       No Known Allergies  Prior to Admission Medications   Prescriptions Last Dose Informant Patient Reported? Taking?   Blood Glucose Monitoring Suppl (ONE TOUCH ULTRA MINI) w/Device KIT  Self Yes No   Sig: Use as directed   Patient not taking: Reported on 3/25/2024   Blood Pressure Monitoring (Adult Blood Pressure Cuff Lg) KIT   No No   Sig: Use in the morning   Patient not taking: Reported on 3/25/2024   Breo Ellipta 200-25 MCG/ACT inhaler  Self No No   Sig: Inhale 1 puff daily Rinse mouth after use.   Patient not taking: Reported on 3/25/2024   ONETOUCH DELICA LANCETS FINE MISC  Self Yes No   Sig: 3 (three) times a day Test   Thiamine HCl (vitamin B-1) 100 MG TABS  Self No No   Sig: TAKE 1 TABLET DAILY   Patient not taking: Reported on 3/25/2024   albuterol (Ventolin HFA) 90 mcg/act inhaler Not Taking Self No No   Sig: Inhale 2 puffs every 4 (four) hours as needed for wheezing   Patient not taking: Reported on 3/25/2024   aluminum-magnesium hydroxide-simethicone (MAALOX) 2454-1927-281 mg/30 mL suspension Past Week  No Yes   Sig: Take 30 mL by mouth every 4 (four) hours as needed for indigestion or heartburn   apixaban (Eliquis) 5 mg 8/14/2024  No Yes   Sig: TAKE 1 TABLET BY MOUTH 2 TIMES A  DAY DO NOT START BEFORE JANUARY 31, 2024.   aspirin (ECOTRIN LOW STRENGTH) 81 mg EC tablet 8/14/2024 Self Yes Yes   Sig: Take 81 mg by mouth daily   ferrous sulfate 325 (65 Fe) mg tablet 8/14/2024 Self No Yes   Sig: Take 1 tablet (325 mg total) by mouth daily with breakfast   folic acid (FOLVITE) 1 mg tablet 8/14/2024  No Yes   Sig: TAKE 1 TABLET DAILY   gabapentin (NEURONTIN) 300 mg capsule 8/14/2024  No Yes   Sig: TAKE ONE CAPSULE THREE TIMES DAILY   magnesium Oxide (MAG-OX) 400 mg TABS 8/14/2024  No Yes   Sig: Take 1 tablet (400 mg total) by mouth 2 (two) times a day   metFORMIN (GLUCOPHAGE) 500 mg tablet 8/14/2024  No Yes   Sig: TAKE 1 TABLET DAILY WITH BREAKFAST   metoprolol tartrate (LOPRESSOR) 50 mg tablet 8/14/2024  No Yes   Sig: Take 1 tablet (50 mg total) by mouth every 12 (twelve) hours   naltrexone (REVIA) 50 mg tablet 8/14/2024  No Yes   Sig: Take 1 tablet (50 mg total) by mouth daily   nicotine (NICODERM CQ) 21 mg/24 hr TD 24 hr patch Not Taking Self No No   Sig: Place 1 patch on the skin over 24 hours daily Do not start before December 4, 2023.   Patient not taking: Reported on 3/25/2024   pantoprazole (PROTONIX) 40 mg tablet 8/14/2024  No Yes   Sig: TAKE 1 TABLET TWO TIMES A DAY   pravastatin (PRAVACHOL) 40 mg tablet 8/14/2024 Self No Yes   Sig: TAKE 1 TABLET DAILY WITH DINNER   ranolazine (RANEXA) 500 mg 12 hr tablet 8/14/2024  No Yes   Sig: TAKE 1 TABLET EVERY 12 HOURS   senna-docusate sodium (SENOKOT S) 8.6-50 mg per tablet Not Taking  No No   Sig: Take 1 tablet by mouth daily as needed for constipation   Patient not taking: Reported on 3/25/2024   tamsulosin (FLOMAX) 0.4 mg 8/14/2024  No Yes   Sig: TAKE 1 CAPSULE DAILY   varenicline (CHANTIX) 1 mg tablet Unknown  No No   Sig: TAKE 1 TABLET 2 TIMES A DAY      Facility-Administered Medications: None     Social History     Socioeconomic History    Marital status: Single     Spouse name: Not on file    Number of children: Not on file    Years of  education: Not on file    Highest education level: Not on file   Occupational History    Occupation: contractor, 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)   Tobacco Use    Smoking status: Every Day     Current packs/day: 1.50     Average packs/day: 1.5 packs/day for 40.0 years (60.0 ttl pk-yrs)     Types: Cigarettes    Smokeless tobacco: Never   Vaping Use    Vaping status: Never Used   Substance and Sexual Activity    Alcohol use: Yes     Alcohol/week: 4.0 standard drinks of alcohol     Types: 4 Standard drinks or equivalent per week     Comment: quart of vodka daily    Drug use: No    Sexual activity: Not Currently   Other Topics Concern    Not on file   Social History Narrative    Not on file     Social Determinants of Health     Financial Resource Strain: Low Risk  (6/4/2024)    Overall Financial Resource Strain (CARDIA)     Difficulty of Paying Living Expenses: Not very hard   Food Insecurity: No Food Insecurity (6/4/2024)    Hunger Vital Sign     Worried About Running Out of Food in the Last Year: Never true     Ran Out of Food in the Last Year: Never true   Transportation Needs: No Transportation Needs (6/4/2024)    PRAPARE - Transportation     Lack of Transportation (Medical): No     Lack of Transportation (Non-Medical): No   Physical Activity: Not on file   Stress: No Stress Concern Present (11/2/2023)    Citizen of [place] [employer] - Occupational Stress Questionnaire     Feeling of Stress : Not at all   Social Connections: Unknown (4/15/2021)    Social Connection and Isolation Panel [NHANES]     Frequency of Communication with Friends and Family: More than three times a week     Frequency of Social Gatherings with Friends and Family: Not on file     Attends Mandaen Services: Not on file     Active Member of Clubs or Organizations: Not on file     Attends Club or Organization Meetings: Not on file     Marital Status: Not on file   Intimate Partner Violence: Not on file   Housing  "Stability: Low Risk  (6/4/2024)    Housing Stability Vital Sign     Unable to Pay for Housing in the Last Year: No     Number of Times Moved in the Last Year: 1     Homeless in the Last Year: No     Family History   Problem Relation Age of Onset    Diabetes Mother     Uterine cancer Mother     COPD Father     Hypertension Father            Objective     Physical Exam:   Vitals:   Blood Pressure: (!) 154/118 (08/15/24 0320)  Pulse: 74 (08/15/24 0245)  Temperature: (!) 97.4 °F (36.3 °C) (08/15/24 0320)  Temp Source: Oral (08/15/24 0018)  Respirations: 20 (08/15/24 0245)  Height: 6' 2\" (188 cm) (08/15/24 0329)  Weight - Scale: 91.4 kg (201 lb 8 oz) (08/15/24 0329)  SpO2: 96 % (08/15/24 0253)     Physical Exam  Vitals and nursing note reviewed.   Constitutional:       General: He is not in acute distress.     Appearance: He is not ill-appearing.   HENT:      Head: Normocephalic and atraumatic.      Right Ear: External ear normal.      Left Ear: External ear normal.      Nose: Nose normal.      Mouth/Throat:      Mouth: Mucous membranes are dry.      Pharynx: Oropharynx is clear.   Eyes:      Extraocular Movements: Extraocular movements intact.   Cardiovascular:      Rate and Rhythm: Regular rhythm. Tachycardia present.      Pulses: Normal pulses.      Heart sounds: Normal heart sounds.   Pulmonary:      Effort: Pulmonary effort is normal.      Breath sounds: Normal breath sounds.   Abdominal:      General: Abdomen is flat.      Palpations: Abdomen is soft.      Tenderness: There is no abdominal tenderness. There is no guarding.   Musculoskeletal:         General: Normal range of motion.      Cervical back: Normal range of motion.   Skin:     General: Skin is warm and dry.      Capillary Refill: Capillary refill takes less than 2 seconds.   Neurological:      General: No focal deficit present.      Mental Status: He is alert and oriented to person, place, and time.   Psychiatric:         Mood and Affect: Mood normal.    "      Behavior: Behavior normal.          Lab Results: I have personally reviewed pertinent reports.    Results from last 7 days   Lab Units 08/15/24  0024   WBC Thousand/uL 7.13   HEMOGLOBIN g/dL 12.1   HEMATOCRIT % 36.5   PLATELETS Thousands/uL 68*   SEGS PCT % 69   LYMPHO PCT % 16   MONO PCT % 11   EOS PCT % 3     Results from last 7 days   Lab Units 08/15/24  0229 08/15/24  0024   POTASSIUM mmol/L  --  4.7   CHLORIDE mmol/L  --  95*   CO2 mmol/L  --  20*   BUN mg/dL  --  32*   CREATININE mg/dL  --  1.79*   CALCIUM mg/dL  --  8.2*   ALK PHOS U/L  --  67   ALT U/L  --  62*   AST U/L  --  68*   EGFR ml/min/1.73sq m  --  39   MAGNESIUM mg/dL 0.9*  --                       Results from last 7 days   Lab Units 08/15/24  0229   COLOR UA  Yellow   CLARITY UA  Clear   SPEC GRAV UA  1.010   PH UA  5.5   LEUKOCYTES UA  Negative   NITRITE UA  Negative   GLUCOSE UA mg/dl Negative   KETONES UA mg/dl Negative   BILIRUBIN UA  Negative   BLOOD UA  Negative           Results from last 7 days   Lab Units 08/15/24  0229 08/15/24  0024   HS TNI 0HR ng/L  --  39   HS TNI 2HR ng/L 33  --              Imaging:  No results found.            ** Please Note: This note has been constructed using a voice recognition system **     Ishmael Presley DO  08/15/24  4:37 AM

## 2024-08-15 NOTE — CASE MANAGEMENT
Case Management Assessment & Discharge Planning Note    Patient name Gregg Partida  Location [address]-* MRN [id number]  : 1960 Date 8/15/2024       Current Admission Date: 8/15/2024  Current Admission Diagnosis:Alcohol withdrawal syndrome with complication (HCC)   Patient Active Problem List    Diagnosis Date Noted Date Diagnosed    Fall, initial encounter 2024     Hypertension 2024     Hemorrhagic cystitis 2024     Alcohol abuse 2024     ENMA (acute kidney injury) ([facility]) 02/10/2024     Hypothyroidism 2024     Troponin level elevated 2024     Gastrointestinal hemorrhage with melena 10/29/2023     Abnormal thyroid stimulating hormone level 2023     Chest pain 2023     Paroxysmal atrial fibrillation (HCC) 2023     GERD (gastroesophageal reflux disease) 2023     Sleep apnea 2023     Stable angina 2022     Stage 3a chronic kidney disease ([facility]) 2022     Fatty liver, alcoholic 04/15/2022     Nonischemic nontraumatic myocardial injury 04/10/2022     History of atrial fibrillation 10/25/2021     Mild protein-calorie malnutrition (HCC) 2021     Prostate cancer ([facility]) 2021     Metabolic acidosis, increased anion gap 2021     A-fib ([facility]) 2020     Chronic heart failure with preserved ejection fraction ([facility]) 2019     Noncompliance 2019     Alcohol abuse with withdrawal delirium ([facility]) 2019     Alcohol withdrawal syndrome with complication ([facility]) 10/17/2019     Hyponatremia 10/17/2019     Cervical spinal stenosis 10/17/2019     Thrombocytopenia ([facility]) 2019     History of prostate cancer 2019     CAD (coronary artery disease) 2019     Nicotine abuse 2019     Hypomagnesemia 10/10/2018     Depression, recurrent ([facility]) 10/10/2018     Hx of CABG 10/10/2018     Dyslipidemia 10/10/2018     COPD (chronic obstructive pulmonary disease) ([facility]) 10/09/2018     Type 2 diabetes  mellitus with diabetic chronic kidney disease (HCC) 10/09/2018     Hypertensive heart and chronic kidney disease with heart failure and stage 1 through stage 4 chronic kidney disease, or chronic kidney disease (HCC) 10/09/2018       LOS (days): 0  Geometric Mean LOS (GMLOS) (days):   Days to GMLOS:     OBJECTIVE:      Current admission status: Observation  Referral Reason: Drug/Alcohol Abuse    Preferred Pharmacy:   [facility] [place], [place] - [address] [address]  Phone: [phone] Fax: [phone]    [facility] [address] [place], [place] - [address]  [address]  [facility] [address]  Phone: [phone] Fax: [phone]    Primary Care Provider: Lilliana Bliss MD    Primary Insurance: [facility] REP  Secondary Insurance:     ASSESSMENT:  Active Health Care Proxies       Gregg Partida Jr Health Care Representative - Son   Primary Phone: [phone] (Mobile)            Readmission Root Cause  30 Day Readmission: No    Patient Information  Admitted from:: Home  Mental Status: Alert  During Assessment patient was accompanied by: Not accompanied during assessment  Assessment information provided by:: Patient  Primary Caregiver: Self  Support Systems: Self, Son  County of Residence: [place]  What city do you live in?: [place], [place]  Home entry access options. Select all that apply.: No steps to enter home, Elevator  Type of Current Residence: Apartment  Floor Level: 5  Living Arrangements: Lives Alone    Activities of Daily Living Prior to Admission  Functional Status: Independent  Completes ADLs independently?: Yes  Ambulates independently?: Yes  Does patient use assisted devices?: Yes  Assisted Devices (DME) used: Walker (as needed)  Does patient currently own DME?: Yes  What DME does the patient currently own?: Walker  Does patient have a history of Outpatient Therapy (PT/OT)?: No  Does the patient have a history of Short-Term Rehab?:  Yes  Does patient have a history of HHC?: No  Does patient currently have HHC?: No     Patient Information Continued  Income Source: SSI/SSD  Does patient have prescription coverage?: Yes (Uses [facility])  Does patient receive dialysis treatments?: No  Does patient have a history of substance abuse?: Yes  Historical substance use preference: Alcohol/ETOH  Is patient currently in treatment for substance abuse?: No. Patient declined treatment information. (Patient states he already has a list of programs through the [facility] and was recently receiving services through [facility]. Patient stopped going to  due to conflict with frequent provider changes.)     Means of Transportation  Means of Transport to \[facility]\"":: Himanshu Amanda    Social Determinants of Health (SDOH)      Flowsheet Row Most Recent Value   Housing Stability    In the last 12 months, was there a time when you were not able to pay the mortgage or rent on time? N   In the past 12 months, how many times have you moved where you were living? 0   At any time in the past 12 months, were you homeless or living in a shelter (including now)? N   Transportation Needs    In the past 12 months, has lack of transportation kept you from medical appointments or from getting medications? no   In the past 12 months, has lack of transportation kept you from meetings, work, or from getting things needed for daily living? No   Food Insecurity    Within the past 12 months, you worried that your food would run out before you got the money to buy more. Never true   Within the past 12 months, the food you bought just didn't last and you didn't have money to get more. Never true   Utilities    In the past 12 months has the electric, gas, oil, or water company threatened to shut off services in your home? No          DISCHARGE DETAILS:    Discharge planning discussed with:: Patient  Freedom of Choice: Yes  Comments - Freedom of Choice: Choice is to discharge home.  Denies any questions, concerns, or anticipated discharge needs SW can assist with at this time.  CM contacted family/caregiver?: No- see comments (Patient declined call to contact.)  Were Treatment Team discharge recommendations reviewed with patient/caregiver?: Yes  Did patient/caregiver verbalize understanding of patient care needs?: Yes  Were patient/caregiver advised of the risks associated with not following Treatment Team discharge recommendations?: Yes    Contacts  Patient Contacts: Gregg Partida Jr (son)  Relationship to Patient:: Family  Contact Method: Phone  Phone Number: [phone]  Reason/Outcome: Emergency Contact    Requested Home Health Care         Is the patient interested in HHC at discharge?: No    DME Referral Provided  Referral made for DME?: No    Other Referral/Resources/Interventions Provided:  Interventions: None Indicated     Treatment Team Recommendation: Substance Abuse Treatment  Discharge Destination Plan:: Home

## 2024-08-15 NOTE — Clinical Note
Case was discussed with Family Practice Resident and the patient's admission status was agreed to be Admission Status: inpatient status to the service of Family Practice .

## 2024-08-16 ENCOUNTER — APPOINTMENT (OUTPATIENT)
Dept: NON INVASIVE DIAGNOSTICS | Facility: HOSPITAL | Age: 64
End: 2024-08-16
Payer: COMMERCIAL

## 2024-08-16 PROBLEM — E87.29 METABOLIC ACIDOSIS, INCREASED ANION GAP: Status: RESOLVED | Noted: 2021-04-21 | Resolved: 2024-08-16

## 2024-08-16 PROBLEM — N17.9 AKI (ACUTE KIDNEY INJURY) (HCC): Status: RESOLVED | Noted: 2024-02-10 | Resolved: 2024-08-16

## 2024-08-16 PROBLEM — E87.1 HYPONATREMIA: Status: RESOLVED | Noted: 2019-10-17 | Resolved: 2024-08-16

## 2024-08-16 LAB
ALBUMIN SERPL BCG-MCNC: 3.4 G/DL (ref 3.5–5)
ALP SERPL-CCNC: 54 U/L (ref 34–104)
ALT SERPL W P-5'-P-CCNC: 36 U/L (ref 7–52)
ANION GAP SERPL CALCULATED.3IONS-SCNC: 7 MMOL/L (ref 4–13)
AST SERPL W P-5'-P-CCNC: 29 U/L (ref 13–39)
BASOPHILS # BLD AUTO: 0.04 THOUSANDS/ÂΜL (ref 0–0.1)
BASOPHILS NFR BLD AUTO: 1 % (ref 0–1)
BILIRUB SERPL-MCNC: 0.41 MG/DL (ref 0.2–1)
BUN SERPL-MCNC: 29 MG/DL (ref 5–25)
CALCIUM ALBUM COR SERPL-MCNC: 8.8 MG/DL (ref 8.3–10.1)
CALCIUM SERPL-MCNC: 8.3 MG/DL (ref 8.4–10.2)
CHLORIDE SERPL-SCNC: 105 MMOL/L (ref 96–108)
CO2 SERPL-SCNC: 24 MMOL/L (ref 21–32)
CREAT SERPL-MCNC: 1.16 MG/DL (ref 0.6–1.3)
EOSINOPHIL # BLD AUTO: 0.24 THOUSAND/ÂΜL (ref 0–0.61)
EOSINOPHIL NFR BLD AUTO: 5 % (ref 0–6)
ERYTHROCYTE [DISTWIDTH] IN BLOOD BY AUTOMATED COUNT: 15.6 % (ref 11.6–15.1)
GFR SERPL CREATININE-BSD FRML MDRD: 67 ML/MIN/1.73SQ M
GLUCOSE SERPL-MCNC: 100 MG/DL (ref 65–140)
GLUCOSE SERPL-MCNC: 105 MG/DL (ref 65–140)
GLUCOSE SERPL-MCNC: 123 MG/DL (ref 65–140)
GLUCOSE SERPL-MCNC: 136 MG/DL (ref 65–140)
GLUCOSE SERPL-MCNC: 139 MG/DL (ref 65–140)
HCT VFR BLD AUTO: 31.2 % (ref 36.5–49.3)
HGB BLD-MCNC: 10.1 G/DL (ref 12–17)
IMM GRANULOCYTES # BLD AUTO: 0.01 THOUSAND/UL (ref 0–0.2)
IMM GRANULOCYTES NFR BLD AUTO: 0 % (ref 0–2)
LYMPHOCYTES # BLD AUTO: 1.1 THOUSANDS/ÂΜL (ref 0.6–4.47)
LYMPHOCYTES NFR BLD AUTO: 24 % (ref 14–44)
MAGNESIUM SERPL-MCNC: 1.8 MG/DL (ref 1.9–2.7)
MCH RBC QN AUTO: 32.2 PG (ref 26.8–34.3)
MCHC RBC AUTO-ENTMCNC: 32.4 G/DL (ref 31.4–37.4)
MCV RBC AUTO: 99 FL (ref 82–98)
MONOCYTES # BLD AUTO: 0.66 THOUSAND/ÂΜL (ref 0.17–1.22)
MONOCYTES NFR BLD AUTO: 15 % (ref 4–12)
NEUTROPHILS # BLD AUTO: 2.45 THOUSANDS/ÂΜL (ref 1.85–7.62)
NEUTS SEG NFR BLD AUTO: 55 % (ref 43–75)
NRBC BLD AUTO-RTO: 0 /100 WBCS
PLATELET # BLD AUTO: 61 THOUSANDS/UL (ref 149–390)
PMV BLD AUTO: 12.1 FL (ref 8.9–12.7)
POTASSIUM SERPL-SCNC: 4.5 MMOL/L (ref 3.5–5.3)
PROT SERPL-MCNC: 4.9 G/DL (ref 6.4–8.4)
RBC # BLD AUTO: 3.14 MILLION/UL (ref 3.88–5.62)
SODIUM SERPL-SCNC: 136 MMOL/L (ref 135–147)
WBC # BLD AUTO: 4.5 THOUSAND/UL (ref 4.31–10.16)

## 2024-08-16 PROCEDURE — 99232 SBSQ HOSP IP/OBS MODERATE 35: CPT | Performed by: STUDENT IN AN ORGANIZED HEALTH CARE EDUCATION/TRAINING PROGRAM

## 2024-08-16 PROCEDURE — 93306 TTE W/DOPPLER COMPLETE: CPT

## 2024-08-16 PROCEDURE — 97167 OT EVAL HIGH COMPLEX 60 MIN: CPT

## 2024-08-16 PROCEDURE — 82948 REAGENT STRIP/BLOOD GLUCOSE: CPT

## 2024-08-16 PROCEDURE — 97530 THERAPEUTIC ACTIVITIES: CPT

## 2024-08-16 PROCEDURE — 83735 ASSAY OF MAGNESIUM: CPT

## 2024-08-16 PROCEDURE — 85025 COMPLETE CBC W/AUTO DIFF WBC: CPT

## 2024-08-16 PROCEDURE — 80053 COMPREHEN METABOLIC PANEL: CPT

## 2024-08-16 PROCEDURE — 97163 PT EVAL HIGH COMPLEX 45 MIN: CPT

## 2024-08-16 PROCEDURE — 93306 TTE W/DOPPLER COMPLETE: CPT | Performed by: INTERNAL MEDICINE

## 2024-08-16 RX ORDER — LORAZEPAM 1 MG/1
2 TABLET ORAL ONCE
Status: DISCONTINUED | OUTPATIENT
Start: 2024-08-16 | End: 2024-08-17 | Stop reason: HOSPADM

## 2024-08-16 RX ORDER — CHLORDIAZEPOXIDE HYDROCHLORIDE 25 MG/1
25 CAPSULE, GELATIN COATED ORAL EVERY 6 HOURS PRN
Status: DISCONTINUED | OUTPATIENT
Start: 2024-08-17 | End: 2024-08-17 | Stop reason: HOSPADM

## 2024-08-16 RX ORDER — CHLORDIAZEPOXIDE HYDROCHLORIDE 25 MG/1
50 CAPSULE, GELATIN COATED ORAL EVERY 8 HOURS SCHEDULED
Status: COMPLETED | OUTPATIENT
Start: 2024-08-16 | End: 2024-08-16

## 2024-08-16 RX ORDER — LORAZEPAM 1 MG/1
2 TABLET ORAL ONCE
Status: CANCELLED | OUTPATIENT
Start: 2024-08-16 | End: 2024-08-16

## 2024-08-16 RX ORDER — GABAPENTIN 300 MG/1
300 CAPSULE ORAL 3 TIMES DAILY
Status: DISCONTINUED | OUTPATIENT
Start: 2024-08-16 | End: 2024-08-17 | Stop reason: HOSPADM

## 2024-08-16 RX ORDER — MAGNESIUM SULFATE HEPTAHYDRATE 40 MG/ML
2 INJECTION, SOLUTION INTRAVENOUS ONCE
Status: COMPLETED | OUTPATIENT
Start: 2024-08-16 | End: 2024-08-16

## 2024-08-16 RX ORDER — CHLORDIAZEPOXIDE HYDROCHLORIDE 25 MG/1
25 CAPSULE, GELATIN COATED ORAL EVERY 8 HOURS SCHEDULED
Status: DISCONTINUED | OUTPATIENT
Start: 2024-08-17 | End: 2024-08-16

## 2024-08-16 RX ORDER — CHLORDIAZEPOXIDE HYDROCHLORIDE 25 MG/1
50 CAPSULE, GELATIN COATED ORAL EVERY 8 HOURS SCHEDULED
Status: DISCONTINUED | OUTPATIENT
Start: 2024-08-16 | End: 2024-08-16

## 2024-08-16 RX ADMIN — GABAPENTIN 300 MG: 300 CAPSULE ORAL at 08:55

## 2024-08-16 RX ADMIN — GABAPENTIN 300 MG: 300 CAPSULE ORAL at 20:35

## 2024-08-16 RX ADMIN — APIXABAN 5 MG: 5 TABLET, FILM COATED ORAL at 08:55

## 2024-08-16 RX ADMIN — CHLORDIAZEPOXIDE HYDROCHLORIDE 50 MG: 25 CAPSULE ORAL at 00:18

## 2024-08-16 RX ADMIN — NICOTINE 1 PATCH: 14 PATCH, EXTENDED RELEASE TRANSDERMAL at 08:55

## 2024-08-16 RX ADMIN — THIAMINE HCL TAB 100 MG 100 MG: 100 TAB at 08:55

## 2024-08-16 RX ADMIN — FLUTICASONE FUROATE AND VILANTEROL TRIFENATATE 1 PUFF: 200; 25 POWDER RESPIRATORY (INHALATION) at 10:17

## 2024-08-16 RX ADMIN — Medication 400 MG: at 17:06

## 2024-08-16 RX ADMIN — CHLORDIAZEPOXIDE HYDROCHLORIDE 50 MG: 25 CAPSULE ORAL at 06:16

## 2024-08-16 RX ADMIN — FOLIC ACID 1000 MCG: 1 TABLET ORAL at 08:55

## 2024-08-16 RX ADMIN — RANOLAZINE 500 MG: 500 TABLET, FILM COATED, EXTENDED RELEASE ORAL at 20:35

## 2024-08-16 RX ADMIN — APIXABAN 5 MG: 5 TABLET, FILM COATED ORAL at 17:06

## 2024-08-16 RX ADMIN — CHLORDIAZEPOXIDE HYDROCHLORIDE 50 MG: 25 CAPSULE ORAL at 14:31

## 2024-08-16 RX ADMIN — PANTOPRAZOLE SODIUM 40 MG: 40 TABLET, DELAYED RELEASE ORAL at 17:06

## 2024-08-16 RX ADMIN — PRAVASTATIN SODIUM 40 MG: 40 TABLET ORAL at 17:06

## 2024-08-16 RX ADMIN — Medication 400 MG: at 08:55

## 2024-08-16 RX ADMIN — RANOLAZINE 500 MG: 500 TABLET, FILM COATED, EXTENDED RELEASE ORAL at 08:55

## 2024-08-16 RX ADMIN — SODIUM CHLORIDE, SODIUM LACTATE, POTASSIUM CHLORIDE, AND CALCIUM CHLORIDE 75 ML/HR: .6; .31; .03; .02 INJECTION, SOLUTION INTRAVENOUS at 08:53

## 2024-08-16 RX ADMIN — CHLORDIAZEPOXIDE HYDROCHLORIDE 50 MG: 25 CAPSULE ORAL at 22:11

## 2024-08-16 RX ADMIN — TAMSULOSIN HYDROCHLORIDE 0.4 MG: 0.4 CAPSULE ORAL at 08:55

## 2024-08-16 RX ADMIN — GABAPENTIN 300 MG: 300 CAPSULE ORAL at 17:06

## 2024-08-16 RX ADMIN — FERROUS SULFATE TAB 325 MG (65 MG ELEMENTAL FE) 325 MG: 325 (65 FE) TAB at 08:55

## 2024-08-16 RX ADMIN — PANTOPRAZOLE SODIUM 40 MG: 40 TABLET, DELAYED RELEASE ORAL at 06:16

## 2024-08-16 RX ADMIN — GABAPENTIN 300 MG: 300 CAPSULE ORAL at 04:20

## 2024-08-16 RX ADMIN — MAGNESIUM SULFATE HEPTAHYDRATE 2 G: 40 INJECTION, SOLUTION INTRAVENOUS at 12:28

## 2024-08-16 RX ADMIN — METOPROLOL TARTRATE 50 MG: 50 TABLET, FILM COATED ORAL at 08:55

## 2024-08-16 RX ADMIN — METOPROLOL TARTRATE 50 MG: 50 TABLET, FILM COATED ORAL at 20:35

## 2024-08-16 NOTE — OCCUPATIONAL THERAPY NOTE
Occupational Therapy Evaluation     Patient Name: Gregg Partida  Today's Date: 8/16/2024  Problem List  Principal Problem:    Alcohol withdrawal syndrome with complication (HCC)  Active Problems:    COPD (chronic obstructive pulmonary disease) (HCC)    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 disease (HCC)    Hypomagnesemia    Hx of CABG    History of prostate cancer    Thrombocytopenia (HCC)    Chronic heart failure with preserved ejection fraction (HCC)    Noncompliance    A-fib (HCC)    Fatty liver, alcoholic    ENMA (acute kidney injury) (HCC)    Hypertension    Past Medical History  Past Medical History:   Diagnosis Date    Acute on chronic kidney failure  (HCC)     Alcohol withdrawal (HCC) 06/07/2019    Atrial fibrillation (HCC)     Cancer (HCC)     prostate ca,had radiation    Cardiac disease     stents,then triple bypass    COPD (chronic obstructive pulmonary disease) (HCC)     Coronary artery disease     ETOH abuse     Heart failure (HCC)     History of heart surgery     [facility] triple bypass [facility]    Hx of heart artery stent     2014    Hyperlipidemia     Hypertension     Hyponatremia 10/17/2019    Hypovolemic shock (HCC) 12/22/2019    Lumbar spondylitis (HCC) 10/13/2022    Metabolic acidosis, increased anion gap 04/21/2021    Nasal bone fracture 10/10/2022    Prostate CA (HCC)     S/P CABG x 3     2004    Sleep apnea      Past Surgical History  Past Surgical History:   Procedure Laterality Date    CARDIAC CATHETERIZATION      2 stents    CORONARY ARTERY BYPASS GRAFT  2004    [place] ARTHRD ANT INTERBODY MIN DSC CRV BELOW C2 N/A 12/16/2020    Procedure: Anterior cervical discectomy with fusion C4-C7; Posterior cervical decompression and fusion C2-T2;  Surgeon: David Rowell MD;  [facility];  Service: Neurosurgery    TONSILLECTOMY          08/16/24 1045   OT Last Visit   OT Visit Date 08/16/24  (Friday)   Note Type   Note type Evaluation  (Eval [id number])   Additional Comments  "Identified pt by full name and birthdate. \"call me Elijah or Gregg, whatever\"   Pain Assessment   Pain Assessment Tool 0-10   Pain Score No Pain   Restrictions/Precautions   Weight Bearing Precautions Per Order No   Other Precautions Fall Risk  (Balwinder, room air, CIWAA protocol)   Home Living   Type of Home Apartment;Other (Comment)  (elevator accessible 6th floor apt)   Home Layout One level;Performs ADLs on one level;Able to live on main level with bedroom/bathroom;Elevator   Bathroom Shower/Tub Tub/shower unit   Bathroom Equipment   (no DME)   Bathroom Accessibility Accessible   Home Equipment Walker   Additional Comments Pt reports living alone in elevator accessible apt   Prior Function   Level of [place] Independent with ADLs;Independent with functional mobility;Needs assistance with IADLS  (does not drive)   Lives With (S)  Alone   Receives Help From Family;Friend(s)  (son, grandchildren, friends)   IADLs Family/Friend/Other provides transportation   Falls in the last 6 months 5 to 10   Vocational Retired   Comments Pt reports I w/ ADLs using walker as needed. Pt does not drive and needs assistance w/ community mobility. Pt reports he cut back / quit drinking and then developed \"problems\"   Lifestyle   Autonomy Pt reports I w/ ADLs using walker vs no AD   Reciprocal Relationships Pt reports living alone.   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 Pt added that he would like to resume riding his moutain bike, attend outpatient program to get his license back   General   Additional Pertinent History Pt presented to [facility] on 8/15[facility]24 w/ SOB and chest pain   Family/Caregiver Present No   Additional General Comments Personal and environmental factors supporting performance includes   Subjective   Subjective \"I want to walk with the walker first and then try without it\"   ADL   Where Assessed   (standing in " bathroom vs EOB)   Eating Assistance 7  Independent   Grooming Assistance 6  Modified Independent   Grooming Deficit Setup;Supervision/safety  (in stance at bathroom sink after set- up)   UB Bathing Assistance 6  Modified Independent   UB Bathing Deficit Setup;Supervision/safety;Increased time to complete  (standing at sink in bathroom)   LB Bathing Assistance Unable to assess   UB Dressing Assistance 6  Modified independent  (seated to doff gown and don t-shirt)   UB Dressing Deficit Setup;Increased time to complete   LB Dressing Assistance 5  Supervision/Setup   LB Dressing Deficit Setup;Supervision/safety;Increased time to complete   Toileting Assistance  6  Modified independent   Toileting Deficit Setup;Supervison/safety;Increased time to complete  (standing to void in bathroom)   Bed Mobility   Supine to Sit Unable to assess   Sit to Supine Unable to assess   Additional Comments Pt standing in bathroom upon therapist arrival and seated at EOB post eval   Transfers   Sit to Stand 6  Modified independent   Additional items Assist x 1;Increased time required   Stand to Sit 5  Supervision   Additional items Assist x 1;Verbal cues   Additional Comments Pt performed sit <> stand 3X w/ mod I <> S   Functional Mobility   Functional Mobility 5  Supervision   Additional Comments engaged in functional mobility from bathroom w/ S w/ out use of AD; unsteady and reaching for furniture / UE support. Pt engaged in functional mobility > than household distances using RW in dawson w/ mod I. Motivated to walk w/ out use of AD; engaged in functional mobility household distances w/ S and + time. Reaching for railing few times but no LOB   Additional items Rolling walker  (vs no AD)   Balance   Static Sitting Good   Dynamic Sitting Fair   Static Standing Fair   Ambulatory Fair  (using RW)   Activity Tolerance   Activity Tolerance Patient tolerated treatment well   Medical Staff Made Aware spoke w/ PTArlette   Nurse Made Aware spoke w/  Dottie SORIANO   RUE Assessment   RUE Assessment WFL   RUE Strength   RUE Overall Strength Within Functional Limits - able to perform ADL tasks with strength   LUE Assessment   LUE Assessment WFL   LUE Strength   LUE Overall Strength Within Functional Limits - able to perform ADL tasks with strength   Hand Function   Gross Motor Coordination Functional   Fine Motor Coordination Impaired   Sensation   Light Touch No apparent deficits   Psychosocial   Patient Behaviors/Mood Cooperative   Cognition   Overall Cognitive Status WFL  (questionable insight into deficits)   Arousal/Participation Alert;Cooperative   Attention Attends with cues to redirect   Orientation Level Oriented X4   Memory Within functional limits   Following Commands Follows multistep commands with increased time or repetition   Comments Alert, oriented and agreeable to participate. Motivated to walk and return home to PLOF   Assessment   Limitation Decreased endurance;Decreased high-level ADLs;Decreased self-care trans  (impaired balance, coordination)   Assessment Pt is a 63yo male admitted to hospitals on 8/15/24 w/ shortness of breath and chest pain on L. Diagnosed w/ alcohol withdrawal syndrome  w/ complication. Pt reports living alone in elevator accessible apt and I w/ ADLs using walker vs no AD. Pt needs assistance w/ IADLs and does not drive. Significant PMH impacting his occupational performance includes a-fib, prostate cancer s/p radiation, COPD, CAD, ETOH abuse, CABG X3 (2004), HTN. Upon eval, pt alert and generally oriented. Able to follow directions and communicate wants / needs. Pt demonstrated questionable insight into deficits. Pt required mod I grooming for + time after set- up, mod I UB bathing, mod I UBD, S LBD, mod I toileting, mod I sit <> stand and S functional mobility w/ + time w/ out use of AD. Introduced RW and pt engaged in functional mobility w/ S > than household distances. Pt is completing ADLs below baseline level of I w/ decreased  activity tolerance, endurance. Recommend active participation in ADLs w/ RN staff support / assist using RW, out of bed to chair for all meals and supervision for functional mobility in dawson. No additional acute OT needs at this time. Recommend no post acute OT rehab needs but may benefit from substance use / abuse rehab. Please re consult if acute needs arise / change in functional status. DC OT   Goals   Patient Goals Pt stated that he would like to return home to [facility] and attend outpatient program to get his license back   Plan   OT Frequency Eval only   Discharge Recommendation   Rehab Resource Intensity Level, OT No post-acute rehabilitation needs   AM-PAC Daily Activity Inpatient   Lower Body Dressing 4   Bathing 3   Toileting 4   Upper Body Dressing 4   Grooming 4   Eating 4   Daily Activity Raw Score 23   Daily Activity Standardized Score (Calc for Raw Score >=11) 51.12   AM-PAC Applied Cognition Inpatient   Following a Speech/Presentation 4   Understanding Ordinary Conversation 4   Taking Medications 4   Remembering Where Things Are Placed or Put Away 4   Remembering List of 4-5 Errands 4   Taking Care of Complicated Tasks 3   Applied Cognition Raw Score 23   Applied Cognition Standardized Score 53.08   Barthel Index   Feeding 10   Bathing 0   Grooming Score 5   Dressing Score 10   Bladder Score 10   Bowels Score 10   Toilet Use Score 10   Transfers (Bed/Chair) Score 10   Mobility (Level Surface) Score 10   Stairs Score 0   Barthel Index Score 75   End of Consult   Education Provided Yes   Patient Position at End of Consult All needs within reach;Seated edge of bed   Nurse Communication Nurse aware of consult   Licensure   [place] License Number  Ilene Trujillo, OTR/L [id number]        The patient's raw score on the AM-PAC Daily Activity Inpatient Short Form is 23. A raw score of greater than or equal to 19 suggests the patient may benefit from discharge to home. Please refer to the recommendation of the  Occupational Therapist for safe discharge planning.    Ilene Trujillo, OTR/L  [id number]  [id number]

## 2024-08-16 NOTE — PHYSICAL THERAPY NOTE
"       PHYSICAL THERAPY EVALUATION/TREATMENT     08/16/24 1115   PT Last Visit   PT Visit Date 08/16/24   Note Type   Note type Evaluation   Pain Assessment   Pain Assessment Tool 0-10   Pain Score No Pain   Restrictions/Precautions   Other Precautions Fall Risk   Home Living   Type of Home House   Home Layout One level;Elevator   Bathroom Shower/Tub Tub/shower unit   Home Equipment   (rolling walker)   Prior Function   Level of [place] Independent with ADLs;Independent with functional mobility   Lives With Alone   Receives Help From Neighbor   Falls in the last 6 months 5 to 10   Comments Pt ambulates without a walker at baseline but uses the walker when he gets out of the hospital until he feels he is safe without the walker.   General   Additional Pertinent History Pt admitted with ETOH withdrawal syndrome. Pt reports he is feeling better today and is ready to go home.   Family/Caregiver Present No   Cognition   Overall Cognitive Status WFL   Attention Attends with cues to redirect   Orientation Level Oriented X4   Following Commands Follows multistep commands with increased time or repetition   Subjective   Subjective \"I want to walk alot\"   RUE Assessment   RUE Assessment WFL   LUE Assessment   LUE Assessment WFL   RLE Assessment   RLE Assessment WFL   LLE Assessment   LLE Assessment WFL   Transfers   Sit to Stand 7  Independent   Stand to Sit 7  Independent   Stand pivot 5  Supervision   Ambulation/Elevation   Gait pattern   (wide BIN, little heel strike)   Gait Assistance 5  Supervision   Assistive Device Rolling walker   Distance 100 feet   Balance   Static Sitting Good   Static Standing Fair   Ambulatory Fair   Assessment   Prognosis Good   Problem List Impaired balance;Decreased strength;Decreased mobility   Assessment Pt is 64 y.o. male seen for PT evaluation s/p admit to [facility] on 8/15/2024 w/ Alcohol withdrawal syndrome with complication (HCC). PT consulted to assess pt's functional " "mobility and d/c needs. Order placed for PT eval and tx, w/ up and OOB as tolerated order.  Co-morbidities affecting patient's physical performance include: COPD, CABG, htn, DM , ETOH abuse, depression.  Personal factors affecting patient at time of initial evaluation include: inability to navigate community distances, limited home support, positive fall history, and depression. Prior to admission, patient was independent with functional mobility without assistive device and independent with ADLS.  Upon evaluation: pt was able to ambulate with and without a walker with supervision assist, quality of gait is improved with walker use.   Please find objective findings from Physical Therapy assessment regarding body systems outlined above with impairments and limitations including impaired balance, gait deviations, decreased activity tolerance, decreased functional mobility tolerance, and fall risk.The Barthel Index was used as a functional outcome tool presenting with a score of Barthel Index Score: 80 today indicating moderate limitations of functional mobility and ADLS.  Patient's clinical presentation is currently unstable/unpredictable as seen in patient's presentation of increased fall risk, new onset of impairment of functional mobility, decreased endurance, and new onset of weakness. Pt would benefit from continued Physical Therapy treatment to address deficits as defined above and maximize level of functional mobility.     As demonstrated by objective findings, the assigned level of complexity for this evaluation is high.The patient's -[facility] Basic Mobility Inpatient Short Form Raw Score is 22. A Raw score of greater than 16 suggests the patient may benefit from discharge to home. Please also refer to the recommendation of the Physical Therapist for safe discharge planning.   Goals   Patient Goals \"I want to get myself together\"   [facility] Expiration Date 08/23/24   Short Term Goal #1 1. no fals, 2. independent " ambulation indoor level surfaces with least retrictive device 500 feet   LTG Expiration Date 08/30/24   Long Term Goal #1 1.independent ambulation outdoor level surfaces with least restrictive device 1000 feet outdoor surfaces with a steady gait, 2. improve dynamic standing balance to at least fair + to decrease fall risk   Plan   Treatment/Interventions LE strengthening/ROM;Functional transfer training;Elevations;Therapeutic exercise;Gait training;Equipment eval/education;Patient/family training;Endurance training   PT Frequency Other (Comment)  (5x/week)   Discharge Recommendation   Rehab Resource Intensity Level, PT III (Minimum Resource Intensity)   Equipment Recommended   (Pt has a walker)   AM-PAC Basic Mobility Inpatient   Turning in Flat Bed Without Bedrails 4   Lying on Back to Sitting on Edge of Flat Bed Without Bedrails 4   Moving Bed to Chair 4   Standing Up From Chair Using Arms 4   Walk in Room 3   Climb 3-5 Stairs With Railing 3   Basic Mobility Inpatient Raw Score 22   Basic Mobility Standardized Score 47.4   [facility] Highest Level Of Mobility   JH-HLM Goal 7: Walk 25 feet or more   JH-HLM Achieved 8: Walk 250 feet ot more   Barthel Index   Feeding 10   Bathing 0   Grooming Score 5   Dressing Score 10   Bladder Score 10   Bowels Score 10   Toilet Use Score 10   Transfers (Bed/Chair) Score 15   Mobility (Level Surface) Score 10   Stairs Score 0   Barthel Index Score 80   Additional Treatment Session   Start Time 1100   End Time 1111   Treatment Assessment S:  I want to walk more   O:  Gait training in the hallway x300 feet with a rolling walker, 200 feet without a walker both with supervision assist.   A:  Pt's gait is more steady with a walker.  Advised the pt to use the walker in the dawson for safety and to inform the nurse that he will be leaving his room to walk.    P: Encourage supervised ambulation with the walker, continue PT to improve balance, endurance and quality of gait.   Licensure    [place] License Number  Cassie Dominguez PT [id number]

## 2024-08-16 NOTE — PROGRESS NOTES
[facility] Progress Note - Gregg Partida 64 y.o. male MRN: [id number]    Unit/Bed#: [address] [place] [address] Encounter: [id number]      Assessment/Plan:  * Alcohol withdrawal syndrome with complication (HCC)  Assessment & Plan  Admission CIWA: 9 - Pt has a h/o chronic daily alcohol consumption but has recently been trying to cut back on the heavy liquor (vodka) and has instead been drinking 6-pack of beer for the past 5 days. He finished his last 6-pack late on 8/14/24.    ED course: Librium 50 mg x 1.     -UDS: negative  -UA w/ reflex: negative  -Ethanol 13    Monitor CIWA score  Continue Librium taper  Librium 50 mg q6h for 24 hr completed  Librium 50 mg q8hr for 24 hr ending 8/16  Followed by Librium 25 mg every 8 hours 8/17  IVF LR 75 ml/hr discontinued once patient tolerated diet   Patient is cleared to leave AMA at 4 am on 8/17 which is 48 hours after first dose of benzo and 6 hours after last administration  Consult case management  Continue seizure precautions    Hypomagnesemia  Assessment & Plan  Chronic, suspect 2/2 chronic alcoholism - pt reports noncompliance with home medications.    PO Mg 0.9.     Plan:  Resume home Mg  Replete as indicated    Hypertension  Assessment & Plan  Chronic, BP labile on admission.  Home medication metoprolol 50 mg twice daily.    Plan:  Continue home medication  Continue to monitor BP during admission.    ENMA (acute kidney injury) (HCC)  Assessment & Plan  Baseline is typically 1.0 - 1.25. POA Cr 1.79. Likely secondary to dehydration.    -Cr 1.79 >1.60     Continue to monitor labs.  Continue to hydrate as appropriate.  Avoid nephrotoxic medications.   Renally dose medications.    Fatty liver, alcoholic  Assessment & Plan  Noted on previous CT. Suspect 2/2 alcohol abuse. On admission AST: 68 and ALT: 62.    Monitor LFTs.  Hepatically dose medications as able.    A-fib (HCC)  Assessment & Plan  Chronic. EKG on admission: sinus tachycardia. Pt reports he was not taking  home eliquis or metoprolol regularly. CHADVASC 3.     Continue home beta blocker and eliquis.  Continue rate control.    Noncompliance  Assessment & Plan  Long standing h/o noncompliance with medications and repeat admissions.    Consult to case management.    Chronic heart failure with preserved ejection fraction (HCC)  Assessment & Plan  Wt Readings from Last 3 Encounters:   08/15/24 91.4 kg (201 lb 8 oz)   06/04/24 91.2 kg (201 lb)   05/23/24 85.7 kg (189 lb)     Previous echo on 9/23: normal systolic function, EF 60%, diastolic HF grade 2. Pt does not appear to be in acute exacerbation.    Monitor daily weights   Monitor I/O  Continue home medications    Thrombocytopenia (HCC)  Assessment & Plan  Acute on chronic, plts on admission 68. Baseline approximately 100 most likely 2/2 alcohol abuse and h/o eliquis and ASA.    Continue home Eliquis 5 mg twice daily  Hold ASA  Monitor platelet    History of prostate cancer  Assessment & Plan  Chronic, stable.     Continue to monitor urinary symptoms (decreased stream, pressure with urination)  Monitor I/O  Urinary retention protocol ordered    Hx of CABG  Assessment & Plan  Chronic, stable. Troponin on admission 39. 2 hr Trop 33.    Holding home ASA 2/2 thrombocytopenia    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 disease ([facility])  Assessment & Plan  Lab Results   Component Value Date    HGBA1C 5.4 01/24/2024     Chronic, stable     Hold metformin  ISS  Pending A1C  Continue carb control diet  Hypoglycemic protocol     COPD (chronic obstructive pulmonary disease) ([facility])  Assessment & Plan  Chronic, stable. No PFT documented.    Continue home Breo inhaler  Recommend OP PFT.    Metabolic acidosis, increased anion gap-resolved as of 8/16/2024  Assessment & Plan  Anion gap on admission: 14. BUN 32 Cr 1.79 - UA negative for ketones. Pt reports poor PO intake and has been consistently drinking 6-packs of beer for the past 5 days.    ED course: received 1L NS  "bolus.    Continue IV hydration 75 mL LR.  Recheck labs in the morning  Continue diabetic diet    Hyponatremia-resolved as of 8/16/2024  Assessment & Plan  Chronic, Na on admission 129 - most likely 2/2 chronic alcohol abuse and poor PO intake.    ED course: give 1L bolus NS and started on 125 mL maintenance fluids of LR    Plan:  1L bolus LR given  LR 75 ml/hr discontinued 8/16  Continue to monitor      Subjective:   Pt seen and examined at bedside. This morning, patient was very tired and wanted to rest. He stated that he wants to leave today. Did not have any complaints at this time.     Upon re-examination, patient was very agitated demanding to leave. Patient was informed that he is unable to leave AMA at this time since it is still within 48 hours of first benzodiazepine administration. Nurse was informed that if he attempts to leave, to please call security and he is not allowed to leave until 4 am 8/17 since that is 6 hours post benzo administration. Nurse and patient is aware of this plan.     Objective:     Vitals: Blood pressure 134/65, pulse 74, temperature 97.6 °F (36.4 °C), resp. rate 15, height 6' 2\" (1.88 m), weight 85.7 kg (189 lb), SpO2 95%.,Body mass index is 24.27 kg/m².  Wt Readings from Last 3 Encounters:   08/16/24 85.7 kg (189 lb)   06/04/24 91.2 kg (201 lb)   05/23/24 85.7 kg (189 lb)       Intake/Output Summary (Last 24 hours) at 8/16/2024 1249  Last data filed at 8/16/2024 0730  Gross per 24 hour   Intake --   Output 850 ml   Net -850 ml       Physical Exam  Constitutional:       Appearance: Normal appearance.   HENT:      Head: Normocephalic and atraumatic.      Mouth/Throat:      Mouth: Mucous membranes are moist.   Eyes:      General:         Right eye: No discharge.         Left eye: No discharge.      Conjunctiva/sclera: Conjunctivae normal.   Cardiovascular:      Rate and Rhythm: Normal rate and regular rhythm.      Pulses: Normal pulses.      Heart sounds: Normal heart sounds. "   Pulmonary:      Effort: Pulmonary effort is normal. No respiratory distress.      Breath sounds: Normal breath sounds.   Abdominal:      General: Bowel sounds are normal.      Palpations: Abdomen is soft.      Tenderness: There is no abdominal tenderness.   Musculoskeletal:      Cervical back: Neck supple.      Right lower leg: No edema.      Left lower leg: No edema.   Skin:     General: Skin is warm and dry.      Capillary Refill: Capillary refill takes less than 2 seconds.   Neurological:      Mental Status: He is alert.   Psychiatric:         Behavior: Behavior is agitated.           Recent Results (from the past 24 hour(s))   Magnesium    Collection Time: 08/15/24  2:24 PM   Result Value Ref Range    Magnesium 1.8 (L) 1.9 - 2.7 mg/dL   Fingerstick Glucose (POCT)    Collection Time: 08/15/24  4:04 PM   Result Value Ref Range    POC Glucose 289 (H) 65 - 140 mg/dl   CBC and differential    Collection Time: 08/15/24  5:46 PM   Result Value Ref Range    WBC 4.46 4.31 - 10.16 Thousand/uL    RBC 3.21 (L) 3.88 - 5.62 Million/uL    Hemoglobin 10.5 (L) 12.0 - 17.0 g/dL    Hematocrit 31.4 (L) 36.5 - 49.3 %    MCV 98 82 - 98 fL    MCH 32.7 26.8 - 34.3 pg    MCHC 33.4 31.4 - 37.4 g/dL    RDW 15.4 (H) 11.6 - 15.1 %    MPV 10.9 8.9 - 12.7 fL    Platelets 57 (L) 149 - 390 Thousands/uL    nRBC 0 /100 WBCs    Segmented % 65 43 - 75 %    Immature Grans % 0 0 - 2 %    Lymphocytes % 18 14 - 44 %    Monocytes % 12 4 - 12 %    Eosinophils Relative 5 0 - 6 %    Basophils Relative 0 0 - 1 %    Absolute Neutrophils 2.88 1.85 - 7.62 Thousands/µL    Absolute Immature Grans 0.02 0.00 - 0.20 Thousand/uL    Absolute Lymphocytes 0.80 0.60 - 4.47 Thousands/µL    Absolute Monocytes 0.52 0.17 - 1.22 Thousand/µL    Eosinophils Absolute 0.22 0.00 - 0.61 Thousand/µL    Basophils Absolute 0.02 0.00 - 0.10 Thousands/µL   Basic metabolic panel    Collection Time: 08/15/24  5:46 PM   Result Value Ref Range    Sodium 134 (L) 135 - 147 mmol/L     Potassium 4.1 3.5 - 5.3 mmol/L    Chloride 103 96 - 108 mmol/L    CO2 23 21 - 32 mmol/L    ANION GAP 8 4 - 13 mmol/L    BUN 29 (H) 5 - 25 mg/dL    Creatinine 1.23 0.60 - 1.30 mg/dL    Glucose 123 65 - 140 mg/dL    Calcium 8.3 (L) 8.4 - 10.2 mg/dL    eGFR 62 ml/min/1.73sq m   Fingerstick Glucose (POCT)    Collection Time: 08/15/24  8:07 PM   Result Value Ref Range    POC Glucose 157 (H) 65 - 140 mg/dl   CBC and differential    Collection Time: 08/16/24  4:27 AM   Result Value Ref Range    WBC 4.50 4.31 - 10.16 Thousand/uL    RBC 3.14 (L) 3.88 - 5.62 Million/uL    Hemoglobin 10.1 (L) 12.0 - 17.0 g/dL    Hematocrit 31.2 (L) 36.5 - 49.3 %    MCV 99 (H) 82 - 98 fL    MCH 32.2 26.8 - 34.3 pg    MCHC 32.4 31.4 - 37.4 g/dL    RDW 15.6 (H) 11.6 - 15.1 %    MPV 12.1 8.9 - 12.7 fL    Platelets 61 (L) 149 - 390 Thousands/uL    nRBC 0 /100 WBCs    Segmented % 55 43 - 75 %    Immature Grans % 0 0 - 2 %    Lymphocytes % 24 14 - 44 %    Monocytes % 15 (H) 4 - 12 %    Eosinophils Relative 5 0 - 6 %    Basophils Relative 1 0 - 1 %    Absolute Neutrophils 2.45 1.85 - 7.62 Thousands/µL    Absolute Immature Grans 0.01 0.00 - 0.20 Thousand/uL    Absolute Lymphocytes 1.10 0.60 - 4.47 Thousands/µL    Absolute Monocytes 0.66 0.17 - 1.22 Thousand/µL    Eosinophils Absolute 0.24 0.00 - 0.61 Thousand/µL    Basophils Absolute 0.04 0.00 - 0.10 Thousands/µL   Comprehensive metabolic panel    Collection Time: 08/16/24  4:27 AM   Result Value Ref Range    Sodium 136 135 - 147 mmol/L    Potassium 4.5 3.5 - 5.3 mmol/L    Chloride 105 96 - 108 mmol/L    CO2 24 21 - 32 mmol/L    ANION GAP 7 4 - 13 mmol/L    BUN 29 (H) 5 - 25 mg/dL    Creatinine 1.16 0.60 - 1.30 mg/dL    Glucose 100 65 - 140 mg/dL    Calcium 8.3 (L) 8.4 - 10.2 mg/dL    Corrected Calcium 8.8 8.3 - 10.1 mg/dL    AST 29 13 - 39 U/L    ALT 36 7 - 52 U/L    Alkaline Phosphatase 54 34 - 104 U/L    Total Protein 4.9 (L) 6.4 - 8.4 g/dL    Albumin 3.4 (L) 3.5 - 5.0 g/dL    Total Bilirubin 0.41  0.20 - 1.00 mg/dL    eGFR 67 ml/min/1.73sq m   Magnesium    Collection Time: 08/16/24  4:27 AM   Result Value Ref Range    Magnesium 1.8 (L) 1.9 - 2.7 mg/dL   Fingerstick Glucose (POCT)    Collection Time: 08/16/24  7:32 AM   Result Value Ref Range    POC Glucose 105 65 - 140 mg/dl   Fingerstick Glucose (POCT)    Collection Time: 08/16/24 11:32 AM   Result Value Ref Range    POC Glucose 139 65 - 140 mg/dl       Current Facility-Administered Medications   Medication Dose Route Frequency Provider Last Rate Last Admin    aluminum-magnesium hydroxide-simethicone (MAALOX) oral suspension 30 mL  30 mL Oral Q4H PRN Mary Ann Stockton DO   30 mL at 08/15/24 0512    apixaban (ELIQUIS) tablet 5 mg  5 mg Oral BID Ishmael Presley DO   5 mg at 08/16/24 0855    chlordiazePOXIDE (LIBRIUM) capsule 50 mg  50 mg Oral Q8H SEDA Moscoso MD        Followed by    [START ON 8/17/2024] chlordiazePOXIDE (LIBRIUM) capsule 25 mg  25 mg Oral Q8H SEDA Moscoso MD        ferrous sulfate tablet 325 mg  325 mg Oral Daily With Breakfast Ishmael Presley DO   325 mg at 08/16/24 0855    fluticasone-vilanterol 200-25 mcg/actuation 1 puff  1 puff Inhalation Daily Ishmael Presley DO   1 puff at 08/16/24 1017    folic acid (FOLVITE) tablet 1,000 mcg  1,000 mcg Oral Daily Ishmael Presley DO   1,000 mcg at 08/16/24 0855    gabapentin (NEURONTIN) capsule 300 mg  300 mg Oral TID Mary Ann Stockton DO   300 mg at 08/16/24 0855    insulin lispro (HumALOG/ADMELOG) 100 units/mL subcutaneous injection 1-6 Units  1-6 Units Subcutaneous 4x Daily (AC & HS) Ishmael Presley DO   1 Units at 08/15/24 2208    loperamide (IMODIUM) capsule 2 mg  2 mg Oral Q4H PRN Ishmael Presley DO        LORazepam (ATIVAN) tablet 2 mg  2 mg Oral Once Ayla Colindres MD        magnesium Oxide (MAG-OX) tablet 400 mg  400 mg Oral BID Ishmael Presley DO   400 mg at 08/16/24 0855    magnesium sulfate 2 g/50 mL IVPB (premix) 2 g  2 g Intravenous Once Antonino Moscoso MD 25 mL/hr at 08/16/24 1228 2 g at 08/16/24 1228    metoprolol  tartrate (LOPRESSOR) tablet 50 mg  50 mg Oral Q12H [facility] Ishmael Presley, DO   50 mg at 08/16/24 0855    nicotine (NICODERM CQ) 14 mg/24hr TD 24 hr patch 1 patch  1 patch Transdermal Daily Ishmael Presley, DO   1 patch at 08/16/24 0855    ondansetron (ZOFRAN) injection 4 mg  4 mg Intravenous Q6H PRN Ishmael Sakina, DO        pantoprazole (PROTONIX) EC tablet 40 mg  40 mg Oral BID AC Ishmael Presley, DO   40 mg at 08/16/24 0616    polyethylene glycol (MIRALAX) packet 17 g  17 g Oral Daily PRN Ishmael Sakina, DO        pravastatin (PRAVACHOL) tablet 40 mg  40 mg Oral Daily With Dinner Ishmael Presley, DO   40 mg at 08/15/24 1725    ranolazine (RANEXA) 12 hr tablet 500 mg  500 mg Oral Q12H [facility] Ishmael Presley, DO   500 mg at 08/16/24 0855    tamsulosin (FLOMAX) capsule 0.4 mg  0.4 mg Oral Daily Ishmael Presley, DO   0.4 mg at 08/16/24 0855    thiamine tablet 100 mg  100 mg Oral Daily Ishmael Presley, DO   100 mg at 08/16/24 0855       Invasive Devices       Peripheral Intravenous Line  Duration             Peripheral IV 08/15/24 Dorsal (posterior);Left Hand 1 day    Peripheral IV 08/15/24 Left;Ventral (anterior) Forearm 1 day                    Lab, Imaging and other studies: I have personally reviewed pertinent reports.    VTE Pharmacologic Prophylaxis: Eliquis   VTE Mechanical Prophylaxis: sequential compression device    Antonino Moscoso MD

## 2024-08-16 NOTE — PLAN OF CARE
Problem: PHYSICAL THERAPY ADULT  Goal: Performs mobility at highest level of function for planned discharge setting.  See evaluation for individualized goals.  Description: Treatment/Interventions: LE strengthening/ROM, Functional transfer training, Elevations, Therapeutic exercise, Gait training, Equipment eval/education, Patient/family training, Endurance training  Equipment Recommended:  (Pt has a walker)       See flowsheet documentation for full assessment, interventions and recommendations.  Note: Prognosis: Good  Problem List: Impaired balance, Decreased strength, Decreased mobility  Assessment: Pt is 64 y.o. male seen for PT evaluation s/p admit to [facility] on 8/15/2024 w/ Alcohol withdrawal syndrome with complication (HCC). PT consulted to assess pt's functional mobility and d/c needs. Order placed for PT eval and tx, w/ up and OOB as tolerated order.  Co-morbidities affecting patient's physical performance include: COPD, CABG, htn, DM , ETOH abuse, depression.  Personal factors affecting patient at time of initial evaluation include: inability to navigate community distances, limited home support, positive fall history, and depression. Prior to admission, patient was independent with functional mobility without assistive device and independent with ADLS.  Upon evaluation: pt was able to ambulate with and without a walker with supervision assist, quality of gait is improved with walker use.   Please find objective findings from Physical Therapy assessment regarding body systems outlined above with impairments and limitations including impaired balance, gait deviations, decreased activity tolerance, decreased functional mobility tolerance, and fall risk.The Barthel Index was used as a functional outcome tool presenting with a score of Barthel Index Score: 80 today indicating moderate limitations of functional mobility and ADLS.  Patient's clinical presentation is currently unstable/unpredictable  as seen in patient's presentation of increased fall risk, new onset of impairment of functional mobility, decreased endurance, and new onset of weakness. Pt would benefit from continued Physical Therapy treatment to address deficits as defined above and maximize level of functional mobility.     As demonstrated by objective findings, the assigned level of complexity for this evaluation is high.The [facility]'s [facility] Basic Mobility Inpatient Short Form Raw Score is 22. A Raw score of greater than 16 suggests the patient may benefit from discharge to home. Please also refer to the recommendation of the Physical Therapist for safe discharge planning.        Rehab Resource Intensity Level, PT: III (Minimum Resource Intensity)    See flowsheet documentation for full assessment.

## 2024-08-16 NOTE — PLAN OF CARE
Problem: PAIN - ADULT  Goal: Verbalizes/displays adequate comfort level or baseline comfort level  Description: Interventions:  - Encourage patient to monitor pain and request assistance  - Assess pain using appropriate pain scale  - Administer analgesics based on type and severity of pain and evaluate response  - Implement non-pharmacological measures as appropriate and evaluate response  - Consider cultural and social influences on pain and pain management  - Notify physician/advanced practitioner if interventions unsuccessful or patient reports new pain  Outcome: Progressing     Problem: INFECTION - ADULT  Goal: Absence or prevention of progression during hospitalization  Description: INTERVENTIONS:  - Assess and monitor for signs and symptoms of infection  - Monitor lab/diagnostic results  - Monitor all insertion sites, i.e. indwelling lines, tubes, and drains  - Monitor endotracheal if appropriate and nasal secretions for changes in amount and color  - [place] appropriate cooling/warming therapies per order  - Administer medications as ordered  - Instruct and encourage patient and family to use good hand hygiene technique  - Identify and instruct in appropriate isolation precautions for identified infection/condition  Outcome: Progressing     Problem: SAFETY ADULT  Goal: Patient will remain free of falls  Description: INTERVENTIONS:  - Educate patient/family on patient safety including physical limitations  - Instruct patient to call for assistance with activity   - Consult OT/PT to assist with strengthening/mobility   - Keep Call bell within reach  - Keep bed low and locked with side rails adjusted as appropriate  - Keep care items and personal belongings within reach  - Initiate and maintain comfort rounds  - Make Fall Risk Sign visible to staff  - Offer Toileting every 2 Hours, in advance of need  - Initiate/Maintain bed and chair alarm  - Obtain necessary fall risk management equipment: bed and chair  alarm/yellow socks and bracelet   - Apply yellow socks and bracelet for high fall risk patients  - Consider moving patient to room near nurses station  Outcome: Progressing  Goal: Maintain or return to baseline ADL function  Description: INTERVENTIONS:  -  Assess patient's ability to carry out ADLs; assess patient's baseline for ADL function and identify physical deficits which impact ability to perform ADLs (bathing, care of mouth/teeth, toileting, grooming, dressing, etc.)  - Assess/evaluate cause of self-care deficits   - Assess range of motion  - Assess patient's mobility; develop plan if impaired  - Assess patient's need for assistive devices and provide as appropriate  - Encourage maximum independence but intervene and supervise when necessary  - Involve family in performance of ADLs  - Assess for home care needs following discharge   - Consider OT consult to assist with ADL evaluation and planning for discharge  - Provide patient education as appropriate  Outcome: Progressing  Goal: Maintains/Returns to pre admission functional level  Description: INTERVENTIONS:  - Perform AM-PAC 6 Click Basic Mobility/ Daily Activity assessment daily.  - Set and communicate daily mobility goal to care team and patient/family/caregiver.   - Collaborate with rehabilitation services on mobility goals if consulted  - Perform Range of Motion 3 times a day.  - Reposition patient every 2 hours.  - Dangle patient 3 times a day  - Stand patient 3 times a day  - Ambulate patient 3 times a day  - Out of bed to chair 3 times a day   - Out of bed for meals 3 times a day  - Out of bed for toileting  - Record patient progress and toleration of activity level   Outcome: Progressing     Problem: DISCHARGE PLANNING  Goal: Discharge to home or other facility with appropriate resources  Description: INTERVENTIONS:  - Identify barriers to discharge w/patient and caregiver  - Arrange for needed discharge resources and transportation as  appropriate  - Identify discharge learning needs (meds, wound care, etc.)  - Arrange for interpretive services to assist at discharge as needed  - Refer to Case Management Department for coordinating discharge planning if the patient needs post-hospital services based on physician/advanced practitioner order or complex needs related to functional status, cognitive ability, or social support system  Outcome: Progressing     Problem: Knowledge Deficit  Goal: Patient/family/caregiver demonstrates understanding of disease process, treatment plan, medications, and discharge instructions  Description: Complete learning assessment and assess knowledge base.  Interventions:  - Provide teaching at level of understanding  - Provide teaching via preferred learning methods  Outcome: Progressing     Problem: Prexisting or High Potential for Compromised Skin Integrity  Goal: Skin integrity is maintained or improved  Description: INTERVENTIONS:  - Identify patients at risk for skin breakdown  - Assess and monitor skin integrity  - Assess and monitor nutrition and hydration status  - Monitor labs   - Assess for incontinence   - Turn and reposition patient  - Assist with mobility/ambulation  - Relieve pressure over bony prominences  - Avoid friction and shearing  - Provide appropriate hygiene as needed including keeping skin clean and dry  - Evaluate need for skin moisturizer/barrier cream  - Collaborate with interdisciplinary team   - Patient/family teaching  - Consider wound care consult   Outcome: Progressing

## 2024-08-17 VITALS
DIASTOLIC BLOOD PRESSURE: 91 MMHG | HEIGHT: 74 IN | BODY MASS INDEX: 24.26 KG/M2 | SYSTOLIC BLOOD PRESSURE: 151 MMHG | HEART RATE: 73 BPM | RESPIRATION RATE: 16 BRPM | WEIGHT: 189 LBS | TEMPERATURE: 97.8 F | OXYGEN SATURATION: 96 %

## 2024-08-17 PROBLEM — F10.939 ALCOHOL WITHDRAWAL SYNDROME WITH COMPLICATION (HCC): Status: RESOLVED | Noted: 2019-10-17 | Resolved: 2024-08-17

## 2024-08-17 LAB
ALBUMIN SERPL BCG-MCNC: 3.8 G/DL (ref 3.5–5)
ALP SERPL-CCNC: 59 U/L (ref 34–104)
ALT SERPL W P-5'-P-CCNC: 41 U/L (ref 7–52)
ANION GAP SERPL CALCULATED.3IONS-SCNC: 7 MMOL/L (ref 4–13)
AST SERPL W P-5'-P-CCNC: 37 U/L (ref 13–39)
BASOPHILS # BLD AUTO: 0.03 THOUSANDS/ÂΜL (ref 0–0.1)
BASOPHILS NFR BLD AUTO: 1 % (ref 0–1)
BILIRUB SERPL-MCNC: 0.32 MG/DL (ref 0.2–1)
BUN SERPL-MCNC: 29 MG/DL (ref 5–25)
CALCIUM SERPL-MCNC: 8.9 MG/DL (ref 8.4–10.2)
CHLORIDE SERPL-SCNC: 104 MMOL/L (ref 96–108)
CO2 SERPL-SCNC: 26 MMOL/L (ref 21–32)
CREAT SERPL-MCNC: 1.26 MG/DL (ref 0.6–1.3)
EOSINOPHIL # BLD AUTO: 0.28 THOUSAND/ÂΜL (ref 0–0.61)
EOSINOPHIL NFR BLD AUTO: 5 % (ref 0–6)
ERYTHROCYTE [DISTWIDTH] IN BLOOD BY AUTOMATED COUNT: 15.8 % (ref 11.6–15.1)
FOLATE SERPL-MCNC: 18.9 NG/ML
GFR SERPL CREATININE-BSD FRML MDRD: 60 ML/MIN/1.73SQ M
GLUCOSE SERPL-MCNC: 108 MG/DL (ref 65–140)
GLUCOSE SERPL-MCNC: 111 MG/DL (ref 65–140)
GLUCOSE SERPL-MCNC: 117 MG/DL (ref 65–140)
HCT VFR BLD AUTO: 32.2 % (ref 36.5–49.3)
HGB BLD-MCNC: 10.4 G/DL (ref 12–17)
IMM GRANULOCYTES # BLD AUTO: 0.03 THOUSAND/UL (ref 0–0.2)
IMM GRANULOCYTES NFR BLD AUTO: 1 % (ref 0–2)
LYMPHOCYTES # BLD AUTO: 1.13 THOUSANDS/ÂΜL (ref 0.6–4.47)
LYMPHOCYTES NFR BLD AUTO: 21 % (ref 14–44)
MAGNESIUM SERPL-MCNC: 1.6 MG/DL (ref 1.9–2.7)
MCH RBC QN AUTO: 32.3 PG (ref 26.8–34.3)
MCHC RBC AUTO-ENTMCNC: 32.3 G/DL (ref 31.4–37.4)
MCV RBC AUTO: 100 FL (ref 82–98)
MONOCYTES # BLD AUTO: 0.98 THOUSAND/ÂΜL (ref 0.17–1.22)
MONOCYTES NFR BLD AUTO: 18 % (ref 4–12)
NEUTROPHILS # BLD AUTO: 2.96 THOUSANDS/ÂΜL (ref 1.85–7.62)
NEUTS SEG NFR BLD AUTO: 54 % (ref 43–75)
NRBC BLD AUTO-RTO: 0 /100 WBCS
PLATELET # BLD AUTO: 85 THOUSANDS/UL (ref 149–390)
PMV BLD AUTO: 12.2 FL (ref 8.9–12.7)
POTASSIUM SERPL-SCNC: 4.4 MMOL/L (ref 3.5–5.3)
PROT SERPL-MCNC: 6.7 G/DL (ref 6.4–8.4)
RBC # BLD AUTO: 3.22 MILLION/UL (ref 3.88–5.62)
SODIUM SERPL-SCNC: 137 MMOL/L (ref 135–147)
VIT B12 SERPL-MCNC: 390 PG/ML (ref 180–914)
WBC # BLD AUTO: 5.41 THOUSAND/UL (ref 4.31–10.16)

## 2024-08-17 PROCEDURE — 82746 ASSAY OF FOLIC ACID SERUM: CPT

## 2024-08-17 PROCEDURE — 99239 HOSP IP/OBS DSCHRG MGMT >30: CPT | Performed by: STUDENT IN AN ORGANIZED HEALTH CARE EDUCATION/TRAINING PROGRAM

## 2024-08-17 PROCEDURE — 82948 REAGENT STRIP/BLOOD GLUCOSE: CPT

## 2024-08-17 PROCEDURE — 82607 VITAMIN B-12: CPT

## 2024-08-17 PROCEDURE — 83735 ASSAY OF MAGNESIUM: CPT

## 2024-08-17 PROCEDURE — 85025 COMPLETE CBC W/AUTO DIFF WBC: CPT

## 2024-08-17 PROCEDURE — 80053 COMPREHEN METABOLIC PANEL: CPT

## 2024-08-17 RX ORDER — MAGNESIUM OXIDE 400 MG/1
400 TABLET ORAL DAILY
Qty: 30 TABLET | Refills: 0 | Status: SHIPPED | OUTPATIENT
Start: 2024-08-17

## 2024-08-17 RX ORDER — MAGNESIUM SULFATE HEPTAHYDRATE 40 MG/ML
2 INJECTION, SOLUTION INTRAVENOUS ONCE
Status: DISCONTINUED | OUTPATIENT
Start: 2024-08-17 | End: 2024-08-17

## 2024-08-17 RX ADMIN — NICOTINE 1 PATCH: 14 PATCH, EXTENDED RELEASE TRANSDERMAL at 08:51

## 2024-08-17 RX ADMIN — FLUTICASONE FUROATE AND VILANTEROL TRIFENATATE 1 PUFF: 200; 25 POWDER RESPIRATORY (INHALATION) at 08:53

## 2024-08-17 RX ADMIN — ALUMINUM HYDROXIDE, MAGNESIUM HYDROXIDE, DIMETHICONE 30 ML: 400; 400; 40 SUSPENSION ORAL at 12:05

## 2024-08-17 RX ADMIN — THIAMINE HCL TAB 100 MG 100 MG: 100 TAB at 08:50

## 2024-08-17 RX ADMIN — GABAPENTIN 300 MG: 300 CAPSULE ORAL at 08:50

## 2024-08-17 RX ADMIN — Medication 400 MG: at 08:50

## 2024-08-17 RX ADMIN — FOLIC ACID 1000 MCG: 1 TABLET ORAL at 08:50

## 2024-08-17 RX ADMIN — METOPROLOL TARTRATE 50 MG: 50 TABLET, FILM COATED ORAL at 08:50

## 2024-08-17 RX ADMIN — PANTOPRAZOLE SODIUM 40 MG: 40 TABLET, DELAYED RELEASE ORAL at 06:13

## 2024-08-17 RX ADMIN — TAMSULOSIN HYDROCHLORIDE 0.4 MG: 0.4 CAPSULE ORAL at 08:50

## 2024-08-17 RX ADMIN — RANOLAZINE 500 MG: 500 TABLET, FILM COATED, EXTENDED RELEASE ORAL at 08:50

## 2024-08-17 RX ADMIN — APIXABAN 5 MG: 5 TABLET, FILM COATED ORAL at 08:50

## 2024-08-17 RX ADMIN — FERROUS SULFATE TAB 325 MG (65 MG ELEMENTAL FE) 325 MG: 325 (65 FE) TAB at 08:50

## 2024-08-17 NOTE — NURSING NOTE
AVS reviewed, patient verbalized understanding and denied any questions. Patient transported to lobby for discharge by PCA.

## 2024-08-17 NOTE — CASE MANAGEMENT
Case Management Progress Note    Patient name Gregg Partida  Location [address] [place] [address]-* MRN [id number]  : 1960 Date 2024       LOS (days): 0  Geometric Mean LOS (GMLOS) (days):   Days to GMLOS:        OBJECTIVE:        Current admission status: Observation  Preferred Pharmacy:   [facility] [address] [address]  Phone: [phone] Fax: [phone]    [facility] [address] [place] [address] - [address]  [address]  [facility] [address]  Phone: [phone] Fax: [phone]    Primary Care Provider: Lilliana Bliss MD    Primary Insurance: [employer] REP  Secondary Insurance:     PROGRESS NOTE:    SW was notified by nursing that patient needs a Lyft home and is ready for discharge.  Lyft waiver was given to patient by nursing and is signed.  SW requested Lyft in [employer] with nursing added to notification list.

## 2024-08-17 NOTE — DISCHARGE SUMMARY
Discharge Summary - [facility]     Patient Information: Gregg Partida 64 y.o. male MRN: [id number]  Unit/Bed#: [address] [place] [address] Encounter: [id number]     Admitting Physician: Ayla Colindres MD  Discharging Physician/Practitioner: Ayla Colindres MD   PCP: Lilliana Bliss MD  Admission Date:   Admission Orders (From admission, onward)       Ordered        08/15/24 0210  Place in Observation  Once                          Discharge Date: 08/17/24      Reason for Admission: Alcohol abuse [F10.10]  Hyponatremia [E87.1]  Chest pain [R07.9]  Cervical spinal stenosis [M48.02]     Discharge Diagnoses:      Principal Problem (Resolved):    Alcohol withdrawal syndrome with complication (HCC)  Active Problems:    A-fib (HCC)    Hypomagnesemia    Chronic heart failure with preserved ejection fraction (HCC)    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 disease (HCC)    Thrombocytopenia (HCC)    Hypertension    COPD (chronic obstructive pulmonary disease) (HCC)    Hx of CABG    History of prostate cancer    Noncompliance    Fatty liver, alcoholic  Resolved Problems:    Hyponatremia    Metabolic acidosis, increased anion gap    ENMA (acute kidney injury) (HCC)       Consultations During Hospital Stay:  ·       None     Procedures Performed:   ·       none     Significant Findings / Test Results:   8/17:   8/16: Hgb 10.1, Mg 1.8, pl 61  8/15: Plt 68 Trop 39>33 Na 129 K 4.7 Anion Gap 14 BUN 32 Cr 1.79 AST 68 ALT 62 Ethanol 13 Mg 0.9, A1c 5.4    CXR: Stable cardiomegaly and mild congestive changes without consolidation.  UDS: negative  UA w/ reflex: negative  COVID, Flu, RSV: negative     Incidental Findings:   ·       none      Test Results Pending at Discharge (will require follow up):   ·       B12, Folate     Outpatient Tests Requested:  ·       repeat BMP, CBC and Mag in 1 week     Outpatient follow-up Requested:  ·       PCP    Complications:  None    Things to address at first  visit after hospitalization   Have you had any alcoholic beverages since discharge?  Are you getting help with outpatient Rehab, AA, Sponsorship?    Hospital Course:     Gregg Partida is a 64 y.o. male patient with PMHx of alcohol withdrawal syndrome, COPD, chronic HF with preserved EF, hypothyroidism, afib, HTN, depression, and GERD who originally presented to the hospital on 8/15/2024 due to symptoms of alcohol withdrawal. Patient was treated with CIWA protocol and his overall score improved to 3. Completed >48hrs of Librium taper and return to baseline status. Discussed options for rehabilitation services, to which patient reported plan for self enrolled outpatient rehab, any additional services refuse. Patient has remained afebrile and vitally stable for discharge to home with outpatient PCP follow-up.    * Alcohol withdrawal syndrome with complication (HCC)-resolved as of 8/17/2024  Assessment & Plan  Admission CIWA: 9 - Pt has a h/o chronic daily alcohol consumption but has recently been trying to cut back on the heavy liquor (vodka) and has instead been drinking 6-pack of beer for the past 5 days. He finished his last 6-pack late on 8/14/24.    ED course: Librium 50 mg x 1.     -UDS: negative  -UA w/ reflex: negative  -Ethanol 13    Improved CIWA score, back to basline after completing >48hr librium taper  Patient reports plan for outpatient Rehab, refused consult by Crisis for additional resources    A-fib (HCC)  Assessment & Plan  Chronic. EKG on admission: sinus tachycardia. Pt reports he was not taking home eliquis or metoprolol regularly. CHADVASC 3.     Continue home beta blocker and eliquis.    Chronic heart failure with preserved ejection fraction (HCC)  Assessment & Plan  Wt Readings from Last 3 Encounters:   08/15/24 91.4 kg (201 lb 8 oz)   06/04/24 91.2 kg (201 lb)   05/23/24 85.7 kg (189 lb)     Previous echo on 9/23: normal systolic function, EF 60%, diastolic HF grade 2. Pt does not appear to  be in acute exacerbation.    Continue home medications    Hypomagnesemia  Assessment & Plan  Chronic, suspect 2/2 chronic alcoholism - pt reports noncompliance with home medications.    PO Mg 0.9.     Plan:  Home with MagOx daily  Repeat BMP & Mag in 1 week to be reviewed with PCP    Hyponatremia-resolved as of 8/16/2024  Assessment & Plan  Chronic, Na on admission 129 - most likely 2/2 chronic alcohol abuse and poor PO intake.    ED course: give 1L bolus NS and started on 125 mL maintenance fluids of LR    Plan:  Repeat BMP & Mag in 1 week to be reviewed with PCP     Hypertension  Assessment & Plan  Chronic, BP labile on admission.  Home medication metoprolol 50 mg twice daily.    Plan:  Continue home medication  Follow up PCP for routine monitoring    Thrombocytopenia (HCC)  Assessment & Plan  Acute on chronic, plts on admission 68. Baseline approximately 100 most likely 2/2 alcohol abuse and h/o eliquis and ASA.    Repeat CBC in 1 week to be reviewed with PCP    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 disease (HCC)  Assessment & Plan  Lab Results   Component Value Date    HGBA1C 5.4 01/24/2024     Chronic, stable     Resume home meds  Educated on appropriate lifestyle modifications    Fatty liver, alcoholic  Assessment & Plan  Noted on previous CT. Suspect 2/2 alcohol abuse. On admission AST: 68 and ALT: 62.    Encourage alcohol cessation    Noncompliance  Assessment & Plan  Long standing h/o noncompliance with medications and repeat admissions.    Consult to case management.    History of prostate cancer  Assessment & Plan  Chronic, stable.     Hx of CABG  Assessment & Plan  Chronic, stable. Troponin on admission 39. 2 hr Trop 33.    Continue home ASA    COPD (chronic obstructive pulmonary disease) (HCC)  Assessment & Plan  Chronic, stable. No PFT documented.    Continue home Breo inhaler  Advised to f/u outpatient pulm for PFT.    ENMA (acute kidney injury) ([facility])-resolved as of  "8/16/2024  Assessment & Plan  Baseline is typically 1.0 - 1.25. POA Cr 1.79. Likely secondary to dehydration.    -Cr 1.79 >1.60     Continue to monitor labs.  Encourage PO hydration & avoidance of alcohol    Metabolic acidosis, increased anion gap-resolved as of 8/16/2024  Assessment & Plan  Anion gap on admission: 14. BUN 32 Cr 1.79 - UA negative for ketones. Pt reports poor PO intake and has been consistently drinking 6-packs of beer for the past 5 days.    ED course: received 1L NS bolus.    Continue IV hydration 75 mL LR.  Recheck labs in the morning  Continue diabetic diet      Condition at Discharge: stable      Discharge Day Visit / Exam:      Vitals: Blood Pressure: 154/91 (08/17/24 1018)  Pulse: 79 (08/17/24 1018)  Temperature: 97.8 °F (36.6 °C) (08/17/24 0806)  Temp Source: Oral (08/15/24 0018)  Respirations: 16 (08/17/24 0806)  Height: 6' 2\" (188 cm) (08/16/24 1300)  Weight - Scale: 85.7 kg (189 lb) (08/16/24 1300)  SpO2: 95 % (08/17/24 1018)  Exam:   Physical Exam  Constitutional:       General: He is not in acute distress.     Appearance: He is not ill-appearing.   HENT:      Mouth/Throat:      Mouth: Mucous membranes are moist.      Pharynx: Oropharynx is clear.   Eyes:      Conjunctiva/sclera: Conjunctivae normal.      Pupils: Pupils are equal, round, and reactive to light.   Cardiovascular:      Rate and Rhythm: Normal rate and regular rhythm.      Pulses: Normal pulses.      Heart sounds: Normal heart sounds.   Pulmonary:      Effort: Pulmonary effort is normal. No respiratory distress.      Breath sounds: No wheezing, rhonchi or rales.   Abdominal:      General: Bowel sounds are normal. There is no distension.      Palpations: Abdomen is soft.   Musculoskeletal:      Right lower leg: No edema.      Left lower leg: No edema.   Skin:     General: Skin is warm and dry.      Capillary Refill: Capillary refill takes less than 2 seconds.   Neurological:      General: No focal deficit present.      Mental " Status: He is alert and oriented to person, place, and time. Mental status is at baseline.      Motor: No weakness.          Discussion with Family: Yes  Patient Information Sharing: With the consent of Gregg Martínezjann, their loved ones were notified today by inpatient team of the patient’s condition and current plan.  All questions answered.      Discharge instructions/Information to patient and family:   See after visit summary for information provided to patient and family.       Discharge Medications:     Medication List      START taking these medications     magnesium oxide 400 mg tablet; Commonly known as: MAG-OX; Take 1 tablet   (400 mg total) by mouth daily   vitamin B-1 100 MG Tabs; TAKE 1 TABLET DAILY     CONTINUE taking these medications     Adult Blood Pressure Cuff Lg Kit; Use in the morning   aluminum-magnesium hydroxide-simethicone [id number] mg/30 mL   suspension; Commonly known as: MAALOX; Take 30 mL by mouth every 4 (four)   hours as needed for indigestion or heartburn   aspirin 81 mg EC tablet; Commonly known as: ECOTRIN LOW STRENGTH   Eliquis 5 MG; Generic drug: apixaban; TAKE 1 TABLET BY MOUTH 2 TIMES A   DAY DO NOT START BEFORE JANUARY 31, 2024.   ferrous sulfate 325 (65 Fe) mg tablet; Take 1 tablet (325 mg total) by   mouth daily with breakfast   folic acid 1 mg tablet; Commonly known as: FOLVITE; TAKE 1 TABLET DAILY   gabapentin 300 mg capsule; Commonly known as: NEURONTIN; TAKE ONE   CAPSULE THREE TIMES DAILY   magnesium Oxide 400 mg Tabs; Commonly known as: MAG-OX; Take 1 tablet   (400 mg total) by mouth 2 (two) times a day   metFORMIN 500 mg tablet; Commonly known as: GLUCOPHAGE; TAKE 1 TABLET   DAILY WITH BREAKFAST   metoprolol tartrate 50 mg tablet; Commonly known as: LOPRESSOR; Take 1   tablet (50 mg total) by mouth every 12 (twelve) hours   naltrexone 50 mg tablet; Commonly known as: REVIA; Take 1 tablet (50 mg   total) by mouth daily   ONE TOUCH ULTRA MINI w/Device Kit   OneTouch  Delica Lancets Fine Misc   pantoprazole 40 mg tablet; Commonly known as: PROTONIX; TAKE 1 TABLET   TWO TIMES A DAY   pravastatin 40 mg tablet; Commonly known as: PRAVACHOL; TAKE 1 TABLET   DAILY WITH DINNER   ranolazine 500 mg 12 hr tablet; Commonly known as: RANEXA; TAKE 1 TABLET   EVERY 12 HOURS   tamsulosin 0.4 mg; Commonly known as: FLOMAX; TAKE 1 CAPSULE DAILY   varenicline 1 mg tablet; Commonly known as: CHANTIX; TAKE 1 TABLET 2   TIMES A DAY     ASK your doctor about these medications     albuterol 90 mcg/act inhaler; Commonly known as: Ventolin HFA; Inhale 2   puffs every 4 (four) hours as needed for wheezing   Breo Ellipta 200-25 MCG/ACT inhaler; Generic drug:   fluticasone-vilanterol; Inhale 1 puff daily Rinse mouth after use.   nicotine 21 mg/24 hr TD 24 hr patch; Commonly known as: NICODERM CQ;   Place 1 patch on the skin over 24 hours daily Do not start before December 4, 2023.   senna-docusate sodium 8.6-50 mg per tablet; Commonly known as: SENOKOT   S; Take 1 tablet by mouth daily as needed for constipation        Disposition:   Home     For Discharges to [facility]:   ·       Not Applicable to this Patient - Not Applicable to this Patient     Discharge Statement:  This time was spent on the day of discharge. I had direct contact with the patient on the day of discharge. Greater than 50% of the total time was spent examining patient, answering all patient questions, arranging and discussing plan of care with patient as well as directly providing post-discharge instructions.  Additional time then spent on discharge activities.     ** Please Note: This note has been constructed using a voice recognition system **     Jonel Calvillo MD   08/17/24  11:30 AM

## 2024-08-17 NOTE — PLAN OF CARE
Problem: INFECTION - ADULT  Goal: Absence or prevention of progression during hospitalization  Description: INTERVENTIONS:  - Assess and monitor for signs and symptoms of infection  - Monitor lab/diagnostic results  - Monitor all insertion sites, i.e. indwelling lines, tubes, and drains  - Monitor endotracheal if appropriate and nasal secretions for changes in amount and color  - [place] appropriate cooling/warming therapies per order  - Administer medications as ordered  - Instruct and encourage patient and family to use good hand hygiene technique  - Identify and instruct in appropriate isolation precautions for identified infection/condition  Outcome: Progressing     Problem: SAFETY ADULT  Goal: Patient will remain free of falls  Description: INTERVENTIONS:  - Educate patient/family on patient safety including physical limitations  - Instruct patient to call for assistance with activity   - Consult OT/PT to assist with strengthening/mobility   - Keep Call bell within reach  - Keep bed low and locked with side rails adjusted as appropriate  - Keep care items and personal belongings within reach  - Initiate and maintain comfort rounds  - Make Fall Risk Sign visible to staff  - Offer Toileting every 2 Hours, in advance of need  - Initiate/Maintain alarm  - Obtain necessary fall risk management equipment:   - Apply yellow socks and bracelet for high fall risk patients  - Consider moving patient to room near nurses station  Outcome: Progressing

## 2024-08-18 LAB
AORTIC ROOT: 3.2 CM
APICAL FOUR CHAMBER EJECTION FRACTION: 68 %
ATRIAL RATE: 143 BPM
ATRIAL RATE: 144 BPM
BSA FOR ECHO PROCEDURE: 2.12 M2
E WAVE DECELERATION TIME: 134 MS
E/A RATIO: 2.79
FRACTIONAL SHORTENING: 38 (ref 28–44)
INTERVENTRICULAR SEPTUM IN DIASTOLE (PARASTERNAL SHORT AXIS VIEW): 1.4 CM
INTERVENTRICULAR SEPTUM: 1.4 CM (ref 0.6–1.1)
LAAS-AP2: 36.6 CM2
LAAS-AP4: 32.4 CM2
LEFT ATRIUM SIZE: 5.4 CM
LEFT ATRIUM VOLUME (MOD BIPLANE): 130 ML
LEFT ATRIUM VOLUME INDEX (MOD BIPLANE): 61.3 ML/M2
LEFT INTERNAL DIMENSION IN SYSTOLE: 3.6 CM (ref 2.1–4)
LEFT VENTRICULAR INTERNAL DIMENSION IN DIASTOLE: 5.8 CM (ref 3.5–6)
LEFT VENTRICULAR POSTERIOR WALL IN END DIASTOLE: 1.7 CM
LEFT VENTRICULAR STROKE VOLUME: 114 ML
LVSV (TEICH): 114 ML
MV E'TISSUE VEL-LAT: 13 CM/S
MV E'TISSUE VEL-SEP: 7 CM/S
MV PEAK A VEL: 0.43 M/S
MV PEAK E VEL: 120 CM/S
MV STENOSIS PRESSURE HALF TIME: 39 MS
MV VALVE AREA P 1/2 METHOD: 5.64
P AXIS: 113 DEGREES
P AXIS: 119 DEGREES
PR INTERVAL: 122 MS
PR INTERVAL: 124 MS
QRS AXIS: 87 DEGREES
QRS AXIS: 91 DEGREES
QRSD INTERVAL: 84 MS
QRSD INTERVAL: 88 MS
QT INTERVAL: 264 MS
QT INTERVAL: 264 MS
QTC INTERVAL: 407 MS
QTC INTERVAL: 408 MS
RIGHT ATRIUM AREA SYSTOLE A4C: 16.7 CM2
RIGHT VENTRICLE ID DIMENSION: 3.5 CM
SL CV LEFT ATRIUM LENGTH A2C: 7.3 CM
SL CV LV EF: 65
SL CV PED ECHO LEFT VENTRICLE DIASTOLIC VOLUME (MOD BIPLANE) 2D: 170 ML
SL CV PED ECHO LEFT VENTRICLE SYSTOLIC VOLUME (MOD BIPLANE) 2D: 56 ML
T WAVE AXIS: -34 DEGREES
T WAVE AXIS: -42 DEGREES
TR MAX PG: 37 MMHG
TR PEAK VELOCITY: 3 M/S
TRICUSPID ANNULAR PLANE SYSTOLIC EXCURSION: 1.5 CM
TRICUSPID VALVE PEAK REGURGITATION VELOCITY: 3.04 M/S
VENTRICULAR RATE: 143 BPM
VENTRICULAR RATE: 144 BPM

## 2024-08-18 PROCEDURE — 93010 ELECTROCARDIOGRAM REPORT: CPT | Performed by: INTERNAL MEDICINE

## 2024-08-19 ENCOUNTER — TRANSITIONAL CARE MANAGEMENT (OUTPATIENT)
Age: 64
End: 2024-08-19

## 2024-08-20 ENCOUNTER — TELEPHONE (OUTPATIENT)
Age: 64
End: 2024-08-20

## 2024-08-20 NOTE — TELEPHONE ENCOUNTER
Called patient to schedule his AWV. He did not want to schedule anything at this time and said he would call back.

## 2024-08-20 NOTE — TELEPHONE ENCOUNTER
----- Message from Lilliana Bliss MD sent at 8/17/2024  7:27 PM EDT -----  May I know if you can schedule an appointment with this patient to close care gaps? Thank you!!!

## 2024-09-06 NOTE — ASSESSMENT & PLAN NOTE
Problem: Adult Inpatient Plan of Care  Goal: Plan of Care Review  Outcome: Progressing  Goal: Patient-Specific Goal (Individualized)  Outcome: Progressing  Goal: Absence of Hospital-Acquired Illness or Injury  Outcome: Progressing  Goal: Optimal Comfort and Wellbeing  Outcome: Progressing     Problem: Infection  Goal: Absence of Infection Signs and Symptoms  Outcome: Progressing       Continue daily protonix and PRN tums

## 2024-09-07 ENCOUNTER — HOSPITAL ENCOUNTER (EMERGENCY)
Facility: HOSPITAL | Age: 64
Discharge: HOME | End: 2024-09-07
Attending: EMERGENCY MEDICINE
Payer: COMMERCIAL

## 2024-09-07 VITALS
SYSTOLIC BLOOD PRESSURE: 153 MMHG | OXYGEN SATURATION: 90 % | HEART RATE: 109 BPM | TEMPERATURE: 98.4 F | RESPIRATION RATE: 18 BRPM | DIASTOLIC BLOOD PRESSURE: 82 MMHG

## 2024-09-07 DIAGNOSIS — S50.812A ABRASION OF LEFT FOREARM, INITIAL ENCOUNTER: Primary | ICD-10-CM

## 2024-09-07 PROCEDURE — 99283 EMERGENCY DEPT VISIT LOW MDM: CPT

## 2024-09-07 PROCEDURE — 99284 EMERGENCY DEPT VISIT MOD MDM: CPT | Performed by: EMERGENCY MEDICINE

## 2024-09-07 RX ORDER — GINSENG 100 MG
1 CAPSULE ORAL ONCE
Status: COMPLETED | OUTPATIENT
Start: 2024-09-07 | End: 2024-09-07

## 2024-09-07 RX ADMIN — BACITRACIN ZINC 1 SMALL APPLICATION: 500 OINTMENT TOPICAL at 15:48

## 2024-09-07 NOTE — ED PROVIDER NOTES
History  Chief Complaint   Patient presents with    Wound Check     Pt arrived EMS. Pt c/o left forearm skin tear with noted bruising and redness. Pt denies nausea, vomiting, chest pain, or SOB.      64-year-old male, presents by EMS from home for evaluation of wound to left forearm.  Patient admits to drinking alcohol prior to arrival.  Has skin tear to left arm with redness, unsure when he injured it.  Denies any pain or swelling in arm, no fevers, no shortness of breath or chest pain.      History provided by:  Patient   used: No    Wound Check         Prior to Admission Medications   Prescriptions Last Dose Informant Patient Reported? Taking?   Blood Glucose Monitoring Suppl (ONE TOUCH ULTRA MINI) w/Device KIT  Self Yes No   Sig: Use as directed   Patient not taking: Reported on 3/25/2024   Blood Pressure Monitoring (Adult Blood Pressure Cuff Lg) KIT   No No   Sig: Use in the morning   Patient not taking: Reported on 3/25/2024   Breo Ellipta 200-25 MCG/ACT inhaler  Self No No   Sig: Inhale 1 puff daily Rinse mouth after use.   Patient not taking: Reported on 3/25/2024   ONETOUCH DELICA LANCETS FINE MISC  Self Yes No   Sig: 3 (three) times a day Test   Thiamine HCl (vitamin B-1) 100 MG TABS  Self No No   Sig: TAKE 1 TABLET DAILY   albuterol (Ventolin HFA) 90 mcg/act inhaler  Self No No   Sig: Inhale 2 puffs every 4 (four) hours as needed for wheezing   Patient not taking: Reported on 3/25/2024   aluminum-magnesium hydroxide-simethicone (MAALOX) 2909-6251-722 mg/30 mL suspension   No No   Sig: Take 30 mL by mouth every 4 (four) hours as needed for indigestion or heartburn   apixaban (Eliquis) 5 mg   No No   Sig: TAKE 1 TABLET BY MOUTH 2 TIMES A DAY DO NOT START BEFORE JANUARY 31, 2024.   aspirin (ECOTRIN LOW STRENGTH) 81 mg EC tablet  Self Yes No   Sig: Take 81 mg by mouth daily   ferrous sulfate 325 (65 Fe) mg tablet  Self No No   Sig: Take 1 tablet (325 mg total) by mouth daily with breakfast    folic acid (FOLVITE) 1 mg tablet   No No   Sig: TAKE 1 TABLET DAILY   gabapentin (NEURONTIN) 300 mg capsule   No No   Sig: TAKE ONE CAPSULE THREE TIMES DAILY   magnesium Oxide (MAG-OX) 400 mg TABS   No No   Sig: Take 1 tablet (400 mg total) by mouth 2 (two) times a day   magnesium oxide (MAG-OX) 400 mg tablet   No No   Sig: Take 1 tablet (400 mg total) by mouth daily   metFORMIN (GLUCOPHAGE) 500 mg tablet   No No   Sig: TAKE 1 TABLET DAILY WITH BREAKFAST   metoprolol tartrate (LOPRESSOR) 50 mg tablet   No No   Sig: Take 1 tablet (50 mg total) by mouth every 12 (twelve) hours   naltrexone (REVIA) 50 mg tablet   No No   Sig: Take 1 tablet (50 mg total) by mouth daily   nicotine (NICODERM CQ) 21 mg/24 hr TD 24 hr patch  Self No No   Sig: Place 1 patch on the skin over 24 hours daily Do not start before December 4, 2023.   Patient not taking: Reported on 3/25/2024   pantoprazole (PROTONIX) 40 mg tablet   No No   Sig: TAKE 1 TABLET TWO TIMES A DAY   pravastatin (PRAVACHOL) 40 mg tablet  Self No No   Sig: TAKE 1 TABLET DAILY WITH DINNER   ranolazine (RANEXA) 500 mg 12 hr tablet   No No   Sig: TAKE 1 TABLET EVERY 12 HOURS   senna-docusate sodium (SENOKOT S) 8.6-50 mg per tablet   No No   Sig: Take 1 tablet by mouth daily as needed for constipation   Patient not taking: Reported on 3/25/2024   tamsulosin (FLOMAX) 0.4 mg   No No   Sig: TAKE 1 CAPSULE DAILY   varenicline (CHANTIX) 1 mg tablet   No No   Sig: TAKE 1 TABLET 2 TIMES A DAY      Facility-Administered Medications: None       Past Medical History:   Diagnosis Date    Acute on chronic kidney failure  (HCC)     Alcohol withdrawal (HCC) 06/07/2019    Atrial fibrillation (HCC)     Cancer (HCC)     prostate ca,had radiation    Cardiac disease     stents,then triple bypass    COPD (chronic obstructive pulmonary disease) (HCC)     Coronary artery disease     ETOH abuse     Heart failure (HCC)     History of heart surgery     says triple bypass [facility]    Hx of  heart artery stent     2014    Hyperlipidemia     Hypertension     Hyponatremia 10/17/2019    Hypovolemic shock (HCC) 12/22/2019    Lumbar spondylitis ([facility]) 10/13/2022    Metabolic acidosis, increased anion gap 04/21/2021    Nasal bone fracture 10/10/2022    Prostate CA ([facility])     S/P CABG x 3     2004    Sleep apnea        Past Surgical History:   Procedure Laterality Date    CARDIAC CATHETERIZATION      2 stents    CORONARY ARTERY BYPASS GRAFT  2004    KS ARTHRD ANT INTERBODY MIN DSC CRV BELOW C2 N/A 12/16/2020    Procedure: Anterior cervical discectomy with fusion C4-C7; Posterior cervical decompression and fusion C2-T2;  Surgeon: David Rowell MD;  Location: [facility];  Service: Neurosurgery    TONSILLECTOMY         Family History   Problem Relation Age of Onset    Diabetes Mother     Uterine cancer Mother     COPD Father     Hypertension Father      I have reviewed and agree with the history as documented.    E-Cigarette/Vaping    E-Cigarette Use Never User      E-Cigarette/Vaping Substances    Nicotine Yes     THC No     CBD No     Flavoring No     Other No     Unknown No      Social History     Tobacco Use    Smoking status: Every Day     Current packs/day: 1.50     Average packs/day: 1.5 packs/day for 40.0 years (60.0 ttl pk-yrs)     Types: Cigarettes    Smokeless tobacco: Never   Vaping Use    Vaping status: Never Used   Substance Use Topics    Alcohol use: Yes     Alcohol/week: 4.0 standard drinks of alcohol     Types: 4 Standard drinks or equivalent per week     Comment: quart of vodka daily    Drug use: No       Review of Systems   Constitutional: Negative.  Negative for fever.   Respiratory: Negative.     Cardiovascular: Negative.        Physical Exam  Physical Exam  Vitals and nursing note reviewed.   Constitutional:       General: He is not in acute distress.     Comments: Alcohol smell on breath   HENT:      Head: Normocephalic and atraumatic.   Cardiovascular:      Rate and Rhythm: Regular rhythm.  Tachycardia present.   Pulmonary:      Effort: Pulmonary effort is normal.      Breath sounds: Normal breath sounds.   Musculoskeletal:         General: No swelling or tenderness. Normal range of motion.   Skin:     General: Skin is warm and dry.      Comments: Healing skin tear to left volar forearm, surrounding bruising, no tenderness, no fluctuance or induration.   Neurological:      General: No focal deficit present.      Mental Status: He is alert and oriented to person, place, and time.         Vital Signs  ED Triage Vitals [09/07/24 1535]   Temperature Pulse Respirations Blood Pressure SpO2   98.4 °F (36.9 °C) (!) 109 18 153/82 90 %      Temp Source Heart Rate Source Patient Position - Orthostatic VS BP Location FiO2 (%)   Oral Monitor Lying Right arm --      Pain Score       --           Vitals:    09/07/24 1535   BP: 153/82   Pulse: (!) 109   Patient Position - Orthostatic VS: Lying         Visual Acuity      ED Medications  Medications   bacitracin topical ointment 1 small application (1 small application Topical Given 9/7/24 1548)       Diagnostic Studies  Results Reviewed       None                   No orders to display              Procedures  Procedures         ED Course  ED Course as of 09/07/24 1636   Sat Sep 07, 2024   1635 Patient awake and alert, able to ambulate without difficulty, requesting to be discharged.  Patient instructed to keep abrasion on left arm clean, return precautions given.                                 SBIRT 20yo+      Flowsheet Row Most Recent Value   Initial Alcohol Screen: US AUDIT-C     1. How often do you have a drink containing alcohol? 6 Filed at: 09/07/2024 1532   2. How many drinks containing alcohol do you have on a typical day you are drinking?  6 Filed at: 09/07/2024 1532   3a. Male UNDER 65: How often do you have five or more drinks on one occasion? 6 Filed at: 09/07/2024 1532   3b. FEMALE Any Age, or MALE 65+: How often do you have 4 or more drinks on one  occassion? 0 Filed at: 09/07/2024 1532   Audit-C Score 18 Filed at: 09/07/2024 1532   Full Alcohol Screen: US AUDIT    4. How often during the last year have you found that you were not able to stop drinking once you had started? 4 Filed at: 09/07/2024 1532   5. How often during past year have you failed to do what was normally expected of you because of drinking?  4 Filed at: 09/07/2024 1532   6. How often in past year have you needed a first drink in the morning to get yourself going after a heavy drinking session?  4 Filed at: 09/07/2024 1532   7. How often in past year have you had feeling of guilt or remorse after drinking?  4 Filed at: 09/07/2024 1532   8. How often in past year have you been unable to remember what happened night before because you had been drinking?  4 Filed at: 09/07/2024 1532   9. Have you or someone else been injured as a result of your drinking?  0 Filed at: 09/07/2024 1532   10. Has a relative, friend, doctor or other health worker been concerned about your drinking and suggested you cut down?  4 Filed at: 09/07/2024 1532   AUDIT Total Score 42 Filed at: 09/07/2024 1532   BRIGIDA: How many times in the past year have you...    Used an illegal drug or used a prescription medication for non-medical reasons? Never Filed at: 09/07/2024 1532                      Medical Decision Making  64-year-old male, history of alcohol abuse, presented for evaluation of wound on left arm.  Differential diagnosis includes abrasion, cellulitis, abscess among other diagnoses.  On exam, patient has well-healing abrasion with some surrounding bruising, no signs of acute infection.  Will cover wound with bacitracin and dressing.  Patient is clinically intoxicated, will monitor in ED.    Risk  OTC drugs.                 Disposition  Final diagnoses:   Abrasion of left forearm, initial encounter     Time reflects when diagnosis was documented in both MDM as applicable and the Disposition within this note       Time  User Action Codes Description Comment    9/7/2024  4:34 PM Mina Muñoz Add [S50.812A] Abrasion of left forearm, initial encounter           ED Disposition       ED Disposition   Discharge    Condition   Stable    Date/Time   Sat Sep 7, 2024 1634    Comment   Gregg Partida discharge to home/self care.                   Follow-up Information    None         Patient's Medications   Discharge Prescriptions    No medications on file       No discharge procedures on file.    PDMP Review         Value Time User    PDMP Reviewed  Yes 1/24/2024  9:23 AM Tyrone Freire DO            ED Provider  Electronically Signed by             Mina Muñoz MD  09/07/24 3350

## 2024-09-10 ENCOUNTER — TELEPHONE (OUTPATIENT)
Age: 64
End: 2024-09-10

## 2024-09-10 ENCOUNTER — HOSPITAL ENCOUNTER (EMERGENCY)
Facility: HOSPITAL | Age: 64
Discharge: HOME | DRG: 641 | End: 2024-09-11
Attending: EMERGENCY MEDICINE | Admitting: EMERGENCY MEDICINE
Payer: COMMERCIAL

## 2024-09-10 VITALS
SYSTOLIC BLOOD PRESSURE: 102 MMHG | HEART RATE: 99 BPM | OXYGEN SATURATION: 95 % | TEMPERATURE: 98.4 F | DIASTOLIC BLOOD PRESSURE: 56 MMHG | RESPIRATION RATE: 18 BRPM

## 2024-09-10 DIAGNOSIS — F10.10 ALCOHOL ABUSE: Primary | ICD-10-CM

## 2024-09-10 PROCEDURE — 99283 EMERGENCY DEPT VISIT LOW MDM: CPT | Performed by: EMERGENCY MEDICINE

## 2024-09-10 PROCEDURE — 99284 EMERGENCY DEPT VISIT MOD MDM: CPT

## 2024-09-10 NOTE — TELEPHONE ENCOUNTER
Attempted Contacting Patient regarding recent ED Visit on 9/7/24.    Left message asking Patient to call the office to schedule Follow up appointment. Provided office hours and phone number.    ED:[facility]   CC: Wound Check  DX:Abrasion of left forearm  Time: 3:24pm   Last OV:6/4/24

## 2024-09-11 ENCOUNTER — TELEPHONE (OUTPATIENT)
Age: 64
End: 2024-09-11

## 2024-09-11 NOTE — ED PROVIDER NOTES
1. Alcohol abuse      ED Disposition       ED Disposition   Discharge    Condition   Stable    Date/Time   Tue Sep 10, 2024 11:51 PM    Comment   Gregg Partida discharge to home/self care.                   Assessment & Plan       Medical Decision Making    64-year-old male presenting to ED today with alcohol abuse and saying that he is having difficulty walking.  He is able to raise both legs off the stretcher without any difficulty.  Ambulatory here in the department any difficulty.  Was asking me initially for trach sandwich.  Well-known to our department.  Some sort of abuse to the [facility] system as he awaits calls because he feels bad after drinking but also does not want to help himself.  No concern at this time for any underlying pathology.  Will discharge home.                 Medications - No data to display    History of Present Illness       HPI    Gregg Partida is a 64 y.o. male who identifies as a male presenting to the Emergency Department for his alcohol abuse.  He says that he was having difficulty walking and when I told him that it could be due to his alcohol he said he did drink today.  Ambulatory here without any difficulty.  No focal neurological deficits.    Review of Systems   Constitutional:  Negative for chills and fever.   HENT:  Negative for hearing loss.    Eyes:  Negative for visual disturbance.   Respiratory:  Negative for shortness of breath.    Cardiovascular:  Negative for chest pain.   Gastrointestinal:  Negative for abdominal pain, constipation, diarrhea, nausea and vomiting.   Genitourinary:  Negative for difficulty urinating.   Musculoskeletal:  Negative for myalgias.   Skin:  Negative for rash.   Neurological:  Negative for dizziness.   Psychiatric/Behavioral:  Negative for agitation.    All other systems reviewed and are negative.          Objective     ED Triage Vitals [09/10/24 2233]   Temperature Pulse Blood Pressure Respirations SpO2 Patient Position - Orthostatic VS   98.4  °F (36.9 °C) 99 102/56 18 95 % --      Temp Source Heart Rate Source BP Location FiO2 (%) Pain Score    Oral Monitor Right arm -- --        Physical Exam  Vitals and nursing note reviewed.   Constitutional:       General: He is not in acute distress.     Appearance: Normal appearance. He is not ill-appearing.   HENT:      Head: Normocephalic and atraumatic.      Right Ear: External ear normal.      Left Ear: External ear normal.      Nose: Nose normal. No congestion.      Mouth/Throat:      Mouth: Mucous membranes are moist.      Pharynx: Oropharynx is clear. No oropharyngeal exudate.   Eyes:      General:         Right eye: No discharge.         Left eye: No discharge.      Extraocular Movements: Extraocular movements intact.      Conjunctiva/sclera: Conjunctivae normal.      Pupils: Pupils are equal, round, and reactive to light.   Cardiovascular:      Rate and Rhythm: Normal rate and regular rhythm.      Pulses: Normal pulses.      Heart sounds: Normal heart sounds.   Pulmonary:      Effort: Pulmonary effort is normal. No respiratory distress.      Breath sounds: Normal breath sounds. No wheezing.   Abdominal:      General: Abdomen is flat. Bowel sounds are normal. There is no distension.      Palpations: Abdomen is soft.      Tenderness: There is no abdominal tenderness.   Musculoskeletal:         General: No swelling or deformity. Normal range of motion.      Cervical back: Normal range of motion. No rigidity.   Skin:     General: Skin is warm and dry.      Capillary Refill: Capillary refill takes less than 2 seconds.   Neurological:      General: No focal deficit present.      Mental Status: He is alert and oriented to person, place, and time. Mental status is at baseline.      Cranial Nerves: No cranial nerve deficit.      Sensory: No sensory deficit.      Motor: No weakness.      Gait: Gait normal.   Psychiatric:         Mood and Affect: Mood normal.         Behavior: Behavior normal.         Labs Reviewed -  No data to display  No orders to display       Procedures       Seven Severino MD  09/11/24 0239

## 2024-09-11 NOTE — TELEPHONE ENCOUNTER
Contacted Patient regarding recent ED Visit dated 9/10/24.       No outreach done    ED:[facility]  CC:Alcohol Intoxication  DX:Alcohol abuse  Time:10:24    Last OV:6/4/24

## 2024-09-14 ENCOUNTER — HOSPITAL ENCOUNTER (INPATIENT)
Facility: HOSPITAL | Age: 64
LOS: 2 days | Discharge: HOME | DRG: 641 | End: 2024-09-16
Admitting: STUDENT IN AN ORGANIZED HEALTH CARE EDUCATION/TRAINING PROGRAM
Payer: COMMERCIAL

## 2024-09-14 ENCOUNTER — APPOINTMENT (EMERGENCY)
Dept: RADIOLOGY | Facility: HOSPITAL | Age: 64
DRG: 641 | End: 2024-09-14
Payer: COMMERCIAL

## 2024-09-14 DIAGNOSIS — E87.1 HYPONATREMIA: ICD-10-CM

## 2024-09-14 DIAGNOSIS — E83.42 HYPOMAGNESEMIA: ICD-10-CM

## 2024-09-14 DIAGNOSIS — F10.10 ALCOHOL ABUSE: ICD-10-CM

## 2024-09-14 DIAGNOSIS — R53.1 GENERALIZED WEAKNESS: Primary | ICD-10-CM

## 2024-09-14 DIAGNOSIS — E87.29 ALCOHOLIC KETOACIDOSIS: ICD-10-CM

## 2024-09-14 DIAGNOSIS — R79.89 ELEVATED TROPONIN: ICD-10-CM

## 2024-09-14 DIAGNOSIS — E87.8 ELECTROLYTE ABNORMALITY: ICD-10-CM

## 2024-09-14 DIAGNOSIS — F10.131: ICD-10-CM

## 2024-09-14 DIAGNOSIS — Z59.86 FINANCIAL INSECURITY: ICD-10-CM

## 2024-09-14 PROBLEM — F10.90 CHRONIC ALCOHOL USE: Status: ACTIVE | Noted: 2024-09-14

## 2024-09-14 PROBLEM — R26.2 AMBULATORY DYSFUNCTION: Status: ACTIVE | Noted: 2024-09-14

## 2024-09-14 PROBLEM — E61.1 IRON DEFICIENCY: Status: ACTIVE | Noted: 2024-09-14

## 2024-09-14 PROBLEM — N18.9 ACUTE KIDNEY INJURY SUPERIMPOSED ON CHRONIC KIDNEY DISEASE  (HCC): Status: ACTIVE | Noted: 2024-02-10

## 2024-09-14 PROBLEM — N40.0 BPH (BENIGN PROSTATIC HYPERPLASIA): Status: ACTIVE | Noted: 2024-09-14

## 2024-09-14 LAB
2HR DELTA HS TROPONIN: -8 NG/L
4HR DELTA HS TROPONIN: -8 NG/L
ALBUMIN SERPL BCG-MCNC: 4.1 G/DL (ref 3.5–5)
ALP SERPL-CCNC: 99 U/L (ref 34–104)
ALT SERPL W P-5'-P-CCNC: 44 U/L (ref 7–52)
ANION GAP SERPL CALCULATED.3IONS-SCNC: 24 MMOL/L (ref 4–13)
APTT PPP: 31 SECONDS (ref 23–34)
AST SERPL W P-5'-P-CCNC: 66 U/L (ref 13–39)
ATRIAL RATE: 88 BPM
BACTERIA UR QL AUTO: ABNORMAL /HPF
BASOPHILS # BLD AUTO: 0.04 THOUSANDS/ΜL (ref 0–0.1)
BASOPHILS NFR BLD AUTO: 1 % (ref 0–1)
BILIRUB SERPL-MCNC: 1.07 MG/DL (ref 0.2–1)
BILIRUB UR QL STRIP: ABNORMAL
BUN SERPL-MCNC: 29 MG/DL (ref 5–25)
CALCIUM SERPL-MCNC: 8.4 MG/DL (ref 8.4–10.2)
CARDIAC TROPONIN I PNL SERPL HS: 46 NG/L
CARDIAC TROPONIN I PNL SERPL HS: 46 NG/L
CARDIAC TROPONIN I PNL SERPL HS: 54 NG/L
CHLORIDE SERPL-SCNC: 84 MMOL/L (ref 96–108)
CK SERPL-CCNC: 258 U/L (ref 39–308)
CLARITY UR: ABNORMAL
CO2 SERPL-SCNC: 19 MMOL/L (ref 21–32)
COLOR UR: ABNORMAL
CREAT SERPL-MCNC: 1.41 MG/DL (ref 0.6–1.3)
EOSINOPHIL # BLD AUTO: 0.24 THOUSAND/ΜL (ref 0–0.61)
EOSINOPHIL NFR BLD AUTO: 3 % (ref 0–6)
ERYTHROCYTE [DISTWIDTH] IN BLOOD BY AUTOMATED COUNT: 14.6 % (ref 11.6–15.1)
GFR SERPL CREATININE-BSD FRML MDRD: 53 ML/MIN/1.73SQ M
GLUCOSE SERPL-MCNC: 137 MG/DL (ref 65–140)
GLUCOSE SERPL-MCNC: 68 MG/DL (ref 65–140)
GLUCOSE SERPL-MCNC: 97 MG/DL (ref 65–140)
GLUCOSE UR STRIP-MCNC: NEGATIVE MG/DL
HCT VFR BLD AUTO: 38 % (ref 36.5–49.3)
HGB BLD-MCNC: 12.8 G/DL (ref 12–17)
HGB UR QL STRIP.AUTO: ABNORMAL
HYALINE CASTS #/AREA URNS LPF: ABNORMAL /LPF
IMM GRANULOCYTES # BLD AUTO: 0.06 THOUSAND/UL (ref 0–0.2)
IMM GRANULOCYTES NFR BLD AUTO: 1 % (ref 0–2)
INR PPP: 1.07 (ref 0.85–1.19)
KETONES UR STRIP-MCNC: ABNORMAL MG/DL
LEUKOCYTE ESTERASE UR QL STRIP: ABNORMAL
LYMPHOCYTES # BLD AUTO: 1.76 THOUSANDS/ΜL (ref 0.6–4.47)
LYMPHOCYTES NFR BLD AUTO: 25 % (ref 14–44)
MAGNESIUM SERPL-MCNC: 1 MG/DL (ref 1.9–2.7)
MCH RBC QN AUTO: 32.2 PG (ref 26.8–34.3)
MCHC RBC AUTO-ENTMCNC: 33.7 G/DL (ref 31.4–37.4)
MCV RBC AUTO: 96 FL (ref 82–98)
MONOCYTES # BLD AUTO: 0.88 THOUSAND/ΜL (ref 0.17–1.22)
MONOCYTES NFR BLD AUTO: 12 % (ref 4–12)
MUCOUS THREADS UR QL AUTO: ABNORMAL
NEUTROPHILS # BLD AUTO: 4.14 THOUSANDS/ΜL (ref 1.85–7.62)
NEUTS SEG NFR BLD AUTO: 58 % (ref 43–75)
NITRITE UR QL STRIP: NEGATIVE
NON-SQ EPI CELLS URNS QL MICRO: ABNORMAL /HPF
NRBC BLD AUTO-RTO: 0 /100 WBCS
P AXIS: 73 DEGREES
PH UR STRIP.AUTO: 5.5 [PH]
PHOSPHATE SERPL-MCNC: 2.6 MG/DL (ref 2.3–4.1)
PLATELET # BLD AUTO: 57 THOUSANDS/UL (ref 149–390)
PLATELET BLD QL SMEAR: ABNORMAL
PMV BLD AUTO: 11.7 FL (ref 8.9–12.7)
POTASSIUM SERPL-SCNC: 3.8 MMOL/L (ref 3.5–5.3)
PR INTERVAL: 128 MS
PROT SERPL-MCNC: 7.4 G/DL (ref 6.4–8.4)
PROT UR STRIP-MCNC: ABNORMAL MG/DL
PROTHROMBIN TIME: 14.4 SECONDS (ref 12.3–15)
QRS AXIS: 75 DEGREES
QRSD INTERVAL: 86 MS
QT INTERVAL: 370 MS
QTC INTERVAL: 447 MS
RBC # BLD AUTO: 3.97 MILLION/UL (ref 3.88–5.62)
RBC #/AREA URNS AUTO: ABNORMAL /HPF
RBC MORPH BLD: NORMAL
SODIUM SERPL-SCNC: 127 MMOL/L (ref 135–147)
SP GR UR STRIP.AUTO: 1.02 (ref 1–1.03)
T WAVE AXIS: 51 DEGREES
UROBILINOGEN UR STRIP-ACNC: 4 MG/DL
VENTRICULAR RATE: 88 BPM
WBC # BLD AUTO: 7.12 THOUSAND/UL (ref 4.31–10.16)
WBC #/AREA URNS AUTO: ABNORMAL /HPF

## 2024-09-14 PROCEDURE — 83735 ASSAY OF MAGNESIUM: CPT

## 2024-09-14 PROCEDURE — 70450 CT HEAD/BRAIN W/O DYE: CPT

## 2024-09-14 PROCEDURE — 96361 HYDRATE IV INFUSION ADD-ON: CPT

## 2024-09-14 PROCEDURE — 85730 THROMBOPLASTIN TIME PARTIAL: CPT

## 2024-09-14 PROCEDURE — 93005 ELECTROCARDIOGRAM TRACING: CPT

## 2024-09-14 PROCEDURE — 96365 THER/PROPH/DIAG IV INF INIT: CPT

## 2024-09-14 PROCEDURE — 96375 TX/PRO/DX INJ NEW DRUG ADDON: CPT

## 2024-09-14 PROCEDURE — 82550 ASSAY OF CK (CPK): CPT

## 2024-09-14 PROCEDURE — 72125 CT NECK SPINE W/O DYE: CPT

## 2024-09-14 PROCEDURE — 85610 PROTHROMBIN TIME: CPT

## 2024-09-14 PROCEDURE — 81001 URINALYSIS AUTO W/SCOPE: CPT

## 2024-09-14 PROCEDURE — 99285 EMERGENCY DEPT VISIT HI MDM: CPT

## 2024-09-14 PROCEDURE — 99223 1ST HOSP IP/OBS HIGH 75: CPT | Performed by: STUDENT IN AN ORGANIZED HEALTH CARE EDUCATION/TRAINING PROGRAM

## 2024-09-14 PROCEDURE — 85025 COMPLETE CBC W/AUTO DIFF WBC: CPT

## 2024-09-14 PROCEDURE — 36415 COLL VENOUS BLD VENIPUNCTURE: CPT

## 2024-09-14 PROCEDURE — 84100 ASSAY OF PHOSPHORUS: CPT

## 2024-09-14 PROCEDURE — 80053 COMPREHEN METABOLIC PANEL: CPT

## 2024-09-14 PROCEDURE — 84484 ASSAY OF TROPONIN QUANT: CPT

## 2024-09-14 PROCEDURE — 82948 REAGENT STRIP/BLOOD GLUCOSE: CPT

## 2024-09-14 PROCEDURE — 71250 CT THORAX DX C-: CPT

## 2024-09-14 RX ORDER — LORAZEPAM 1 MG/1
2 TABLET ORAL ONCE
Status: COMPLETED | OUTPATIENT
Start: 2024-09-14 | End: 2024-09-14

## 2024-09-14 RX ORDER — VARENICLINE TARTRATE 0.5 MG/1
1 TABLET, FILM COATED ORAL 2 TIMES DAILY
Status: DISCONTINUED | OUTPATIENT
Start: 2024-09-14 | End: 2024-09-14

## 2024-09-14 RX ORDER — DIAZEPAM 10 MG/2ML
10 INJECTION, SOLUTION INTRAMUSCULAR; INTRAVENOUS ONCE
Status: COMPLETED | OUTPATIENT
Start: 2024-09-14 | End: 2024-09-14

## 2024-09-14 RX ORDER — FOLIC ACID 1 MG/1
1 TABLET ORAL ONCE
Status: COMPLETED | OUTPATIENT
Start: 2024-09-14 | End: 2024-09-14

## 2024-09-14 RX ORDER — TAMSULOSIN HYDROCHLORIDE 0.4 MG/1
0.4 CAPSULE ORAL DAILY
Status: DISCONTINUED | OUTPATIENT
Start: 2024-09-14 | End: 2024-09-16 | Stop reason: HOSPADM

## 2024-09-14 RX ORDER — SODIUM CHLORIDE, SODIUM LACTATE, POTASSIUM CHLORIDE, CALCIUM CHLORIDE 600; 310; 30; 20 MG/100ML; MG/100ML; MG/100ML; MG/100ML
100 INJECTION, SOLUTION INTRAVENOUS CONTINUOUS
Status: DISCONTINUED | OUTPATIENT
Start: 2024-09-14 | End: 2024-09-16 | Stop reason: HOSPADM

## 2024-09-14 RX ORDER — FOLIC ACID 1 MG/1
1 TABLET ORAL DAILY
Status: DISCONTINUED | OUTPATIENT
Start: 2024-09-14 | End: 2024-09-16 | Stop reason: HOSPADM

## 2024-09-14 RX ORDER — PRAVASTATIN SODIUM 40 MG
40 TABLET ORAL
Status: DISCONTINUED | OUTPATIENT
Start: 2024-09-14 | End: 2024-09-16 | Stop reason: HOSPADM

## 2024-09-14 RX ORDER — RANOLAZINE 500 MG/1
500 TABLET, EXTENDED RELEASE ORAL EVERY 12 HOURS
Status: DISCONTINUED | OUTPATIENT
Start: 2024-09-14 | End: 2024-09-16 | Stop reason: HOSPADM

## 2024-09-14 RX ORDER — ASPIRIN 81 MG/1
81 TABLET, CHEWABLE ORAL DAILY
Status: DISCONTINUED | OUTPATIENT
Start: 2024-09-14 | End: 2024-09-14

## 2024-09-14 RX ORDER — LANOLIN ALCOHOL/MO/W.PET/CERES
400 CREAM (GRAM) TOPICAL 2 TIMES DAILY
Status: DISCONTINUED | OUTPATIENT
Start: 2024-09-14 | End: 2024-09-16 | Stop reason: HOSPADM

## 2024-09-14 RX ORDER — ONDANSETRON 2 MG/ML
4 INJECTION INTRAMUSCULAR; INTRAVENOUS ONCE
Status: COMPLETED | OUTPATIENT
Start: 2024-09-14 | End: 2024-09-14

## 2024-09-14 RX ORDER — NICOTINE 21 MG/24HR
1 PATCH, TRANSDERMAL 24 HOURS TRANSDERMAL DAILY
Status: DISCONTINUED | OUTPATIENT
Start: 2024-09-14 | End: 2024-09-16 | Stop reason: HOSPADM

## 2024-09-14 RX ORDER — METOPROLOL TARTRATE 50 MG
50 TABLET ORAL EVERY 12 HOURS SCHEDULED
Status: DISCONTINUED | OUTPATIENT
Start: 2024-09-14 | End: 2024-09-16 | Stop reason: HOSPADM

## 2024-09-14 RX ORDER — GABAPENTIN 300 MG/1
300 CAPSULE ORAL 3 TIMES DAILY
Status: DISCONTINUED | OUTPATIENT
Start: 2024-09-14 | End: 2024-09-16 | Stop reason: HOSPADM

## 2024-09-14 RX ORDER — MAGNESIUM SULFATE HEPTAHYDRATE 40 MG/ML
2 INJECTION, SOLUTION INTRAVENOUS ONCE
Status: COMPLETED | OUTPATIENT
Start: 2024-09-14 | End: 2024-09-14

## 2024-09-14 RX ORDER — NALTREXONE HYDROCHLORIDE 50 MG/1
50 TABLET, FILM COATED ORAL DAILY
Status: DISCONTINUED | OUTPATIENT
Start: 2024-09-14 | End: 2024-09-16 | Stop reason: HOSPADM

## 2024-09-14 RX ORDER — LANOLIN ALCOHOL/MO/W.PET/CERES
100 CREAM (GRAM) TOPICAL DAILY
Status: DISCONTINUED | OUTPATIENT
Start: 2024-09-14 | End: 2024-09-16 | Stop reason: HOSPADM

## 2024-09-14 RX ORDER — MAGNESIUM HYDROXIDE/ALUMINUM HYDROXICE/SIMETHICONE 120; 1200; 1200 MG/30ML; MG/30ML; MG/30ML
30 SUSPENSION ORAL EVERY 4 HOURS PRN
Status: DISCONTINUED | OUTPATIENT
Start: 2024-09-14 | End: 2024-09-16 | Stop reason: HOSPADM

## 2024-09-14 RX ORDER — LANOLIN ALCOHOL/MO/W.PET/CERES
100 CREAM (GRAM) TOPICAL ONCE
Status: COMPLETED | OUTPATIENT
Start: 2024-09-14 | End: 2024-09-14

## 2024-09-14 RX ORDER — FERROUS SULFATE 325(65) MG
325 TABLET ORAL
Status: DISCONTINUED | OUTPATIENT
Start: 2024-09-15 | End: 2024-09-16 | Stop reason: HOSPADM

## 2024-09-14 RX ORDER — ALBUTEROL SULFATE 90 UG/1
2 INHALANT RESPIRATORY (INHALATION) EVERY 4 HOURS PRN
Status: DISCONTINUED | OUTPATIENT
Start: 2024-09-14 | End: 2024-09-16 | Stop reason: HOSPADM

## 2024-09-14 RX ORDER — INSULIN LISPRO 100 [IU]/ML
1-6 INJECTION, SOLUTION INTRAVENOUS; SUBCUTANEOUS
Status: DISCONTINUED | OUTPATIENT
Start: 2024-09-14 | End: 2024-09-16 | Stop reason: HOSPADM

## 2024-09-14 RX ORDER — AMOXICILLIN 250 MG
1 CAPSULE ORAL DAILY PRN
Status: DISCONTINUED | OUTPATIENT
Start: 2024-09-14 | End: 2024-09-16 | Stop reason: HOSPADM

## 2024-09-14 RX ORDER — PANTOPRAZOLE SODIUM 40 MG/1
40 TABLET, DELAYED RELEASE ORAL
Status: DISCONTINUED | OUTPATIENT
Start: 2024-09-14 | End: 2024-09-16 | Stop reason: HOSPADM

## 2024-09-14 RX ORDER — FLUTICASONE FUROATE AND VILANTEROL 200; 25 UG/1; UG/1
1 POWDER RESPIRATORY (INHALATION) DAILY
Status: DISCONTINUED | OUTPATIENT
Start: 2024-09-14 | End: 2024-09-16 | Stop reason: HOSPADM

## 2024-09-14 RX ADMIN — FOLIC ACID 1 MG: 1 TABLET ORAL at 12:14

## 2024-09-14 RX ADMIN — Medication 1 TABLET: at 15:59

## 2024-09-14 RX ADMIN — NICOTINE 1 PATCH: 21 PATCH, EXTENDED RELEASE TRANSDERMAL at 16:00

## 2024-09-14 RX ADMIN — RANOLAZINE 500 MG: 500 TABLET, FILM COATED, EXTENDED RELEASE ORAL at 16:00

## 2024-09-14 RX ADMIN — APIXABAN 5 MG: 5 TABLET, FILM COATED ORAL at 17:13

## 2024-09-14 RX ADMIN — ONDANSETRON 4 MG: 2 INJECTION INTRAMUSCULAR; INTRAVENOUS at 11:37

## 2024-09-14 RX ADMIN — DIAZEPAM 10 MG: 5 INJECTION INTRAMUSCULAR; INTRAVENOUS at 11:38

## 2024-09-14 RX ADMIN — THIAMINE HCL TAB 100 MG 100 MG: 100 TAB at 16:00

## 2024-09-14 RX ADMIN — ASPIRIN 81 MG CHEWABLE TABLET 81 MG: 81 TABLET CHEWABLE at 16:00

## 2024-09-14 RX ADMIN — THIAMINE HCL TAB 100 MG 100 MG: 100 TAB at 12:14

## 2024-09-14 RX ADMIN — PRAVASTATIN SODIUM 40 MG: 40 TABLET ORAL at 16:00

## 2024-09-14 RX ADMIN — FOLIC ACID 1 MG: 1 TABLET ORAL at 16:00

## 2024-09-14 RX ADMIN — GABAPENTIN 300 MG: 300 CAPSULE ORAL at 21:34

## 2024-09-14 RX ADMIN — PANTOPRAZOLE SODIUM 40 MG: 40 TABLET, DELAYED RELEASE ORAL at 16:00

## 2024-09-14 RX ADMIN — NALTREXONE HYDROCHLORIDE 50 MG: 50 TABLET, FILM COATED ORAL at 17:14

## 2024-09-14 RX ADMIN — SODIUM CHLORIDE, SODIUM LACTATE, POTASSIUM CHLORIDE, AND CALCIUM CHLORIDE 100 ML/HR: .6; .31; .03; .02 INJECTION, SOLUTION INTRAVENOUS at 17:13

## 2024-09-14 RX ADMIN — Medication 400 MG: at 17:13

## 2024-09-14 RX ADMIN — TAMSULOSIN HYDROCHLORIDE 0.4 MG: 0.4 CAPSULE ORAL at 16:00

## 2024-09-14 RX ADMIN — LORAZEPAM 2 MG: 1 TABLET ORAL at 20:11

## 2024-09-14 RX ADMIN — Medication 1 TABLET: at 12:14

## 2024-09-14 RX ADMIN — MAGNESIUM SULFATE HEPTAHYDRATE 2 G: 40 INJECTION, SOLUTION INTRAVENOUS at 12:15

## 2024-09-14 RX ADMIN — LORAZEPAM 2 MG: 1 TABLET ORAL at 17:13

## 2024-09-14 RX ADMIN — GABAPENTIN 300 MG: 300 CAPSULE ORAL at 16:00

## 2024-09-14 RX ADMIN — LORAZEPAM 2 MG: 1 TABLET ORAL at 18:44

## 2024-09-14 RX ADMIN — FLUTICASONE FUROATE AND VILANTEROL TRIFENATATE 1 PUFF: 200; 25 POWDER RESPIRATORY (INHALATION) at 17:14

## 2024-09-14 RX ADMIN — SODIUM CHLORIDE 1000 ML: 0.9 INJECTION, SOLUTION INTRAVENOUS at 11:37

## 2024-09-14 SDOH — ECONOMIC STABILITY - INCOME SECURITY: FINANCIAL INSECURITY: Z59.86

## 2024-09-14 NOTE — ASSESSMENT & PLAN NOTE
Chronic in setting of alcoholic cirrhosis.   - POA Plt 57    Trend daily  Holding home ASA  Monitor for signs of bleeding  Continue home Eliquis for A-fib. Benefit vs risk in setting of thrombocytopenia

## 2024-09-14 NOTE — ASSESSMENT & PLAN NOTE
Chronic, POA Na 127 & Mg 1.0 likely 2/2 chronic alcohol abuse and poor PO intake. Denies dizziness/lightheadedness, NV.  - ED: 1L NS bolus     Continue home mag ox 400mg BID  Trend daily, replete as needed  Telemetry 24hr  IVF

## 2024-09-14 NOTE — ASSESSMENT & PLAN NOTE
Chronic. Not in acute withdrawal right now.     Continue daily: multivitamin, folic acid 1mg, thiamine 100mg  Monitor with CIWA score

## 2024-09-14 NOTE — ASSESSMENT & PLAN NOTE
Lab Results   Component Value Date    HGBA1C 5.4 08/15/2024     Recent Labs     09/14/24  1658 09/14/24  2106 09/15/24  0709 09/15/24  1142   POCGLU 97 137 129 157*   Blood glucose average over last 72 hours: (P) 130    Chronic, stable. Home med includes metformin 500mg daily and gabapentin 300mg tid    Hold home metformin, continue gabapentin  ISS & Accuchecks ACHS  Hypoglycemic protocol

## 2024-09-14 NOTE — QUICK NOTE
Patient reported that Tried to stand could not walk, felt weak, stated that he fell plenty of times, but not immediately before arrival. Reported stopped drinking on Thursday. Was at ED Wednesday and reported can't walk and not really stand and he was informed that he was drunk. Has not taken home magnesium prescription but that otc mag. Reported taking mvi and is out of his home meds. Reported he does not want a  limited diet.    PE:  General- NAD, obese  HEENT- no nystagmus, perrla  Respi- non-labored,CTA bilaterally  Cardio- no murmor, no rubs, no gallops  Abdomen- soft nt,no rebound, no guarding  Msk- no BLLE  Psych- cooperative  Skin- ecchymosis, various back arms     A/P:  Hyponat-127  Elevated Cr1.41, gentle ivf  Etoh withdrawal- banana bag, ciwa, librium,   Mag- 1.0 trend a replete  Platelet- 57k, transfuse under 20k

## 2024-09-14 NOTE — ED PROVIDER NOTES
1. Generalized weakness    2. Alcohol abuse    3. Hypomagnesemia    4. Alcoholic ketoacidosis    5. Elevated troponin    6. Hyponatremia      ED Disposition       ED Disposition   Admit    Condition   Stable    Date/Time   Sat Sep 14, 2024  1:48 PM    Comment   Case was discussed with family medicine and the patient's admission status was agreed to be Admission Status: observation status to the service of family medicine.               Assessment & Plan       Medical Decision Making  Amount and/or Complexity of Data Reviewed  Labs: ordered. Decision-making details documented in ED Course.  Radiology: ordered.  ECG/medicine tests:  Decision-making details documented in ED Course.    Risk  OTC drugs.  Prescription drug management.  Decision regarding hospitalization.      65 y/o M feeling weak, etoh abuse last drink 3 days ago. Broad w/u for trauma, electrolyte abnormality, cardiac w/u given cp. Slight troponin elevation with no EKG changes, delta stable, doubt ACS. Pt with significant metabolic derangements, hyponatremia, hypomagnesemia. AGMA likely AKA. IV fluids given, mag repleted, vitamins repleted, glucose via PO given. Pt amenable to admission given abnormal electrolytes.             ED Course as of 09/14/24 1421   Sat Sep 14, 2024   1119 ECG 12 lead  Procedure Note: EKG  Date/Time: 09/14/24 11:19 AM   Interpreted by: Thang Zuñiga MD  Indications / Diagnosis: weakness  ECG reviewed by me, the ED Provider: yes   The EKG demonstrates:  Rhythm: normal sinus  Intervals: normal intervals  Axis: normal axis  QRS/Blocks: normal QRS  ST Changes: No acute ST Changes, no STD/KAREN.     1156 ANION GAP(!): 24  Probably AKA, will give PO glucose   1246 D/w pt abnormal labs and recommendation for admission. Pt agreeable. Pending CT reads   1355 Called for rads read on ct chest   1356 hs TnI 2hr: 46       Medications   diazepam (VALIUM) injection 10 mg (10 mg Intravenous Given 9/14/24 1138)   sodium chloride 0.9 % bolus  "1,000 mL (0 mL Intravenous Stopped 9/14/24 1327)   ondansetron (ZOFRAN) injection 4 mg (4 mg Intravenous Given 9/14/24 1137)   magnesium sulfate 2 g/50 mL IVPB (premix) 2 g (0 g Intravenous Stopped 9/14/24 1315)   thiamine tablet 100 mg (100 mg Oral Given 9/14/24 1214)   folic acid (FOLVITE) tablet 1 mg (1 mg Oral Given 9/14/24 1214)   multivitamin-minerals (CENTRUM) tablet 1 tablet (1 tablet Oral Given 9/14/24 1214)       History of Present Illness       HPI    65 y/o M with pmh etoh abuse presenting for generalized weakness. Pt states \"my magnesium is low.\" Reports leg weakness b/l which happens to him when his mag is low. Reports etoh use daily, last drink 3-4 days ago, drinks vodka. No prior etoh withdrawal seizure. Reports falling a few days ago while drinking, unclear mechanism or if loc/hs. Pt reports left sided chest pain since falling, mild. No SOB.     Review of Systems   Constitutional:  Negative for chills and fever.   HENT:  Negative for ear pain and sore throat.    Eyes:  Negative for pain and visual disturbance.   Respiratory:  Negative for cough and shortness of breath.    Cardiovascular:  Positive for chest pain. Negative for palpitations.   Gastrointestinal:  Negative for abdominal pain and vomiting.   Genitourinary:  Negative for dysuria and hematuria.   Musculoskeletal:  Negative for arthralgias and back pain.   Skin:  Negative for color change and rash.   Neurological:  Positive for weakness. Negative for seizures and syncope.   All other systems reviewed and are negative.          Objective     ED Triage Vitals   Temperature Pulse Blood Pressure Respirations SpO2 Patient Position - Orthostatic VS   09/14/24 1120 09/14/24 1120 09/14/24 1120 09/14/24 1120 09/14/24 1117 09/14/24 1120   99 °F (37.2 °C) 83 129/76 20 97 % Lying      Temp Source Heart Rate Source BP Location FiO2 (%) Pain Score    09/14/24 1120 09/14/24 1120 09/14/24 1120 -- --    Oral Monitor Left arm          Physical Exam  Vitals " and nursing note reviewed.   Constitutional:       General: He is not in acute distress.     Appearance: He is well-developed. He is not toxic-appearing.   HENT:      Head: Normocephalic and atraumatic.      Right Ear: External ear normal.      Left Ear: External ear normal.      Nose: Nose normal.      Mouth/Throat:      Pharynx: Oropharynx is clear. No oropharyngeal exudate or posterior oropharyngeal erythema.   Eyes:      Extraocular Movements: Extraocular movements intact.      Conjunctiva/sclera: Conjunctivae normal.      Pupils: Pupils are equal, round, and reactive to light.   Cardiovascular:      Rate and Rhythm: Normal rate and regular rhythm.      Pulses: Normal pulses.      Heart sounds: Normal heart sounds. No murmur heard.     No friction rub. No gallop.   Pulmonary:      Effort: Pulmonary effort is normal. No respiratory distress.      Breath sounds: Normal breath sounds. No wheezing, rhonchi or rales.   Abdominal:      General: Abdomen is flat.      Palpations: Abdomen is soft.      Tenderness: There is no abdominal tenderness. There is no guarding or rebound.   Musculoskeletal:         General: Normal range of motion.      Cervical back: Normal range of motion. No rigidity.      Right lower leg: No edema.      Left lower leg: No edema.   Skin:     General: Skin is warm and dry.      Capillary Refill: Capillary refill takes less than 2 seconds.      Findings: Bruising present.      Comments: Scattered bruising on arms and back. Small hematoma to posterior R shoulder. Bruising to left chest wall   Neurological:      General: No focal deficit present.      Mental Status: He is alert.      Comments: Mild tremors, GCS15, no deficits   Psychiatric:         Mood and Affect: Mood normal.         Labs Reviewed   CBC AND DIFFERENTIAL - Abnormal       Result Value    WBC 7.12      RBC 3.97      Hemoglobin 12.8      Hematocrit 38.0      MCV 96      MCH 32.2      MCHC 33.7      RDW 14.6      MPV 11.7       Platelets 57 (*)     nRBC 0      Segmented % 58      Immature Grans % 1      Lymphocytes % 25      Monocytes % 12      Eosinophils Relative 3      Basophils Relative 1      Absolute Neutrophils 4.14      Absolute Immature Grans 0.06      Absolute Lymphocytes 1.76      Absolute Monocytes 0.88      Eosinophils Absolute 0.24      Basophils Absolute 0.04      Narrative:     This is an appended report.  These results have been appended to a previously verified report.   COMPREHENSIVE METABOLIC PANEL - Abnormal    Sodium 127 (*)     Potassium 3.8      Chloride 84 (*)     CO2 19 (*)     ANION GAP 24 (*)     BUN 29 (*)     Creatinine 1.41 (*)     Glucose 68      Calcium 8.4      AST 66 (*)     ALT 44      Alkaline Phosphatase 99      Total Protein 7.4      Albumin 4.1      Total Bilirubin 1.07 (*)     eGFR 53      Narrative:     National Kid[employer] Foundation guidelines for Chronic Kidney Disease (CKD):     Stage 1 with normal or high GFR (GFR > 90 mL/min/1.73 square meters)    Stage 2 Mild CKD (GFR = 60-89 mL/min/1.73 square meters)    Stage 3A Moderate CKD (GFR = 45-59 mL/min/1.73 square meters)    Stage 3B Moderate CKD (GFR = 30-44 mL/min/1.73 square meters)    Stage 4 Severe CKD (GFR = 15-29 mL/min/1.73 square meters)    Stage 5 End Stage CKD (GFR <15 mL/min/1.73 square meters)  Note: GFR calculation is accurate only with a steady state creatinine   MAGNESIUM - Abnormal    Magnesium 1.0 (*)    HS TROPONIN I 0HR - Abnormal    hs TnI 0hr 54 (*)    SMEAR REVIEW(PHLEBS DO NOT ORDER) - Abnormal    RBC Morphology Normal      Platelet Estimate Decreased (*)    PROTIME-INR - Normal    Protime 14.4      INR 1.07      Narrative:     INR Therapeutic Range    Indication                                             INR Range      Atrial Fibrillation                                               2.0-3.0  Hypercoagulable State                                    2.0.2.3  Left Ventricular Asist Device                             2.0-3.0  Mechanical Heart Valve                                  -    Aortic(with afib, MI, embolism, HF, LA enlargement,    and/or coagulopathy)                                     2.0-3.0 (2.5-3.5)     Mitral                                                             2.5-3.5  Prosthetic/Bioprosthetic Heart Valve               2.0-3.0  Venous thromboembolism (VTE: VT, PE        2.0-3.0   APTT - Normal    PTT 31     CK - Normal    Total      PHOSPHORUS - Normal    Phosphorus 2.6     HS TROPONIN I 2HR - Normal    hs TnI 2hr 46      Delta 2hr hsTnI -8     UA W REFLEX TO MICROSCOPIC WITH REFLEX TO CULTURE   HS TROPONIN I 4HR     CT head without contrast   Final Interpretation by Clay Claros MD (09/14 1239)      No acute intracranial abnormality.                  Workstation performed: [id number]         CT spine cervical without contrast   Final Interpretation by Clay Claros MD (09/14 9677)      No cervical spine fracture identified.      Postsurgical changes from anterior and posterior cervical spinal fusion procedures with laminectomies as described above. Lucency surrounding the bilateral C6 lateral mass screws again mistreated indicative of loosening. The right vertical valorie appears    mildly dislodged from the right T2 transpedicular screw, grossly unchanged.      Stable right posterior paraspinal heterogeneous density chronic seroma/hematoma at the level of the posterior cervical spinal fusion.                        Workstation performed: [id number]         CT chest wo contrast   Final Interpretation by Jens Desai MD (09/14 7031)      No acute left rib fracture is seen. Old right rib fractures.      The study was marked in EPIC for immediate notification.         Workstation performed: [id number]             Procedures       Thang Zuñiga MD  09/14/24 3398

## 2024-09-14 NOTE — ASSESSMENT & PLAN NOTE
Chronic, stable. Home med: Pravastatin 40mg  Last lipid panel 11 months ago: Cholesterol 190, TG 71,      Continue home pravastatin

## 2024-09-14 NOTE — ASSESSMENT & PLAN NOTE
Chronic, suspect 2/2 chronic alcoholism and poor nutrition. Reports noncompliance with home medications.  - POA Mg 1.0.   Resume home Mg  Trend daily, replete as needed

## 2024-09-14 NOTE — ASSESSMENT & PLAN NOTE
Reports noncompliance with medication due to inability to pay for them. Reports attempting to reach his son for assistance after recent discharge with no success.  Referral to CM  Discussed the possibility of STR for PT and med management before transitioning back home, patient resistant

## 2024-09-14 NOTE — ASSESSMENT & PLAN NOTE
Chronic, BP soft on admission. Home medication metoprolol 50 mg twice daily.     Continue home medication

## 2024-09-14 NOTE — ASSESSMENT & PLAN NOTE
Lab Results   Component Value Date    EGFR 53 09/14/2024    EGFR 60 08/17/2024    EGFR 67 08/16/2024    CREATININE 1.41 (H) 09/14/2024    CREATININE 1.26 08/17/2024    CREATININE 1.16 08/16/2024

## 2024-09-14 NOTE — PLAN OF CARE
Problem: PAIN - ADULT  Goal: Verbalizes/displays adequate comfort level or baseline comfort level  Description: Interventions:  - Encourage patient to monitor pain and request assistance  - Assess pain using appropriate pain scale  - Administer analgesics based on type and severity of pain and evaluate response  - Implement non-pharmacological measures as appropriate and evaluate response  - Consider cultural and social influences on pain and pain management  - Notify physician/advanced practitioner if interventions unsuccessful or patient reports new pain  Outcome: Progressing     Problem: INFECTION - ADULT  Goal: Absence or prevention of progression during hospitalization  Description: INTERVENTIONS:  - Assess and monitor for signs and symptoms of infection  - Monitor lab/diagnostic results  - Monitor all insertion sites, i.e. indwelling lines, tubes, and drains  - Monitor endotracheal if appropriate and nasal secretions for changes in amount and color  - [place] appropriate cooling/warming therapies per order  - Administer medications as ordered  - Instruct and encourage patient and family to use good hand hygiene technique  - Identify and instruct in appropriate isolation precautions for identified infection/condition  Outcome: Progressing  Goal: Absence of fever/infection during neutropenic period  Description: INTERVENTIONS:  - Monitor WBC    Outcome: Progressing     Problem: SAFETY ADULT  Goal: Patient will remain free of falls  Description: INTERVENTIONS:  - Educate patient/family on patient safety including physical limitations  - Instruct patient to call for assistance with activity   - Consult OT/PT to assist with strengthening/mobility   - Keep Call bell within reach  - Keep bed low and locked with side rails adjusted as appropriate  - Keep care items and personal belongings within reach  - Initiate and maintain comfort rounds  - Make Fall Risk Sign visible to staff  - Offer Toileting every 2 Hours,  in advance of need  - Initiate/Maintain bed alarm  - Obtain necessary fall risk management equipment: call bell   - Apply yellow socks and bracelet for high fall risk patients  - Consider moving patient to room near nurses station  Outcome: Progressing  Goal: Maintain or return to baseline ADL function  Description: INTERVENTIONS:  -  Assess patient's ability to carry out ADLs; assess patient's baseline for ADL function and identify physical deficits which impact ability to perform ADLs (bathing, care of mouth/teeth, toileting, grooming, dressing, etc.)  - Assess/evaluate cause of self-care deficits   - Assess range of motion  - Assess patient's mobility; develop plan if impaired  - Assess patient's need for assistive devices and provide as appropriate  - Encourage maximum independence but intervene and supervise when necessary  - Involve family in performance of ADLs  - Assess for home care needs following discharge   - Consider OT consult to assist with ADL evaluation and planning for discharge  - Provide patient education as appropriate  Outcome: Progressing  Goal: Maintains/Returns to pre admission functional level  Description: INTERVENTIONS:  - Perform AM-PAC 6 Click Basic Mobility/ Daily Activity assessment daily.  - Set and communicate daily mobility goal to care team and patient/family/caregiver.   - Collaborate with rehabilitation services on mobility goals if consulted  - Ambulate patient 3 times a day  - Out of bed to chair 3 times a day   - Out of bed for meals 3 times a day  - Out of bed for toileting  - Record patient progress and toleration of activity level   Outcome: Progressing     Problem: DISCHARGE PLANNING  Goal: Discharge to home or other facility with appropriate resources  Description: INTERVENTIONS:  - Identify barriers to discharge w/patient and caregiver  - Arrange for needed discharge resources and transportation as appropriate  - Identify discharge learning needs (meds, wound care,  etc.)  - Arrange for interpretive services to assist at discharge as needed  - Refer to Case Management Department for coordinating discharge planning if the patient needs post-hospital services based on physician/advanced practitioner order or complex needs related to functional status, cognitive ability, or social support system  Outcome: Progressing     Problem: Knowledge Deficit  Goal: Patient/family/caregiver demonstrates understanding of disease process, treatment plan, medications, and discharge instructions  Description: Complete learning assessment and assess knowledge base.  Interventions:  - Provide teaching at level of understanding  - Provide teaching via preferred learning methods  Outcome: Progressing

## 2024-09-14 NOTE — ASSESSMENT & PLAN NOTE
Chronic, stable. Troponin delta -8 on admission.  LAZARA score 4. Heart score 2.      Continue home statin  ASA held due to thrombocytopenia

## 2024-09-14 NOTE — ASSESSMENT & PLAN NOTE
Lab Results   Component Value Date    EGFR 52 09/15/2024    EGFR 53 09/14/2024    EGFR 60 08/17/2024    CREATININE 1.42 (H) 09/15/2024    CREATININE 1.41 (H) 09/14/2024    CREATININE 1.26 08/17/2024   Chronic  Elevated Cr on presentation in setting of poor nutrition/hydration and alcohol abuse. Not ENMA per KDIGO.    Trend Daily  IVF

## 2024-09-14 NOTE — ASSESSMENT & PLAN NOTE
Presents with complaint of lower extremity weakness and multiple recent falls in setting of chronic alcohol abuse with severe deconditioning and electrolyte abnormalities. Large hematoma on back and bleeding wounds on R arm and leg noted on initial exam, reports last fall was 3-4 days ago when he last drank approx 1 pint of Vodka. Denies head trauma or LOC. Reports mostly staying in bed since last discharge.     CT chest No acute left rib fracture is seen. Old right rib fractures.   CT cervical spine No cervical spine fracture identified.   CT head No acute intracranial abnormality.     PT/OT, pending recommendations   Fall, CIWA, & Seizure precautions  CM consult

## 2024-09-14 NOTE — H&P
History and Physical - [facility]  Family Medicine Residency     Patient Information: Gregg Partida 64 y.o. male MRN: [id number]  Unit/Bed#: [address] [place] [address] Encounter: [id number]  Admitting Physician: Ayla Colindres MD   PCP: Lilliana Bliss MD  Date of Admission:  09/14/24     Assessment and Plan     * Ambulatory dysfunction  Assessment & Plan  Presents with complaint of lower extremity weakness and multiple recent falls in setting of chronic alcohol abuse with severe deconditioning and electrolyte abnormalities. Large hematoma on back and bleeding wounds on R arm and leg noted on initial exam, reports last fall was 3-4 days ago when he last drank approx 1 pint of Vodka. Denies head trauma or LOC. Reports mostly staying in bed since last discharge.     CT chest No acute left rib fracture is seen. Old right rib fractures.   CT cervical spine No cervical spine fracture identified.   CT head No acute intracranial abnormality.     PT/OT  Fall, CIWA, & Seizure precautions  CM consult    Metabolic acidosis, increased anion gap  Assessment & Plan  POA AG 24 in setting of alcohol use.     monitor BMP  LR      Stage 3a chronic kidney disease (HCC)  Assessment & Plan  Lab Results   Component Value Date    EGFR 53 09/14/2024    EGFR 60 08/17/2024    EGFR 67 08/16/2024    CREATININE 1.41 (H) 09/14/2024    CREATININE 1.26 08/17/2024    CREATININE 1.16 08/16/2024   Chronic, elevated Cr on presentation in setting of poor nutrition/hydration and alcohol abuse. Not ENMA per KDIGO.  Trend Daily  IVF    Electrolyte abnormality  Assessment & Plan  Chronic, POA Na 127 & Mg 1.0 likely 2/2 chronic alcohol abuse and poor PO intake. Denies dizziness/lightheadedness, NV.  - ED: 1L NS bolus   Continue home mag ox 400mg BID  Trend daily, replete as needed  Telemetry 24hr  IVF    Financial insecurity  Assessment & Plan  Reports noncompliance with medication due to inability to pay for them. Reports attempting to reach his  son for assistance after recent discharge with no success.  Referral to CM  Discussed the possibility of STR for PT and med management before transitioning back home, patient resistant    Chronic alcohol use  Assessment & Plan  Chronic. Not in acute withdrawal right now.     -per hospital protocol Daily: multivitamin, folic acid 1mg, thiamine 100mg  -cont monitor with CIWA    BPH (benign prostatic hyperplasia)  Assessment & Plan  Chronic,stable. Home meds include tamsulosin 0.4mg daily    -Continue home med    Iron deficiency  Assessment & Plan  Chronic, stable. Home meds: Ferrous sulfate 325mg daily.    Continue home med    Hypertension  Assessment & Plan  Chronic, BP soft on admission. Home medication metoprolol 50 mg twice daily.     Continue home medication    Acute kidney injury superimposed on chronic kidney disease  (HCC)  Assessment & Plan  Lab Results   Component Value Date    EGFR 53 09/14/2024    EGFR 60 08/17/2024    EGFR 67 08/16/2024    CREATININE 1.41 (H) 09/14/2024    CREATININE 1.26 08/17/2024    CREATININE 1.16 08/16/2024       GERD (gastroesophageal reflux disease)  Assessment & Plan  Chronic, stable. Home med protonix 40mg bid.    -continue home med    A-fib (HCC)  Assessment & Plan  Chronic stable.  - EKG: NSR with PAC  Continue home metoprolol and eliquis    Chronic heart failure with preserved ejection fraction (HCC)  Assessment & Plan  Wt Readings from Last 3 Encounters:   09/14/24 83.3 kg (183 lb 10.3 oz)   08/16/24 85.7 kg (189 lb)   06/04/24 91.2 kg (201 lb)   Chronic, hypovolemic on presentation. Denies chest pain or SOB.  Continue home Metoprolol & Ranexa          Thrombocytopenia (HCC)  Assessment & Plan  Chronic in setting of alcoholic cirrhosis.   - POA Plt 57  Trend daily  Holding home ASA  Monitor for signs of bleeding    Dyslipidemia  Assessment & Plan  Chronic, stable. Home med: Pravastatin 40mg  Last lipid panel 11 months ago: Cholesterol 190, TG 71,      -continue home  med        Hx of CABG  Assessment & Plan  Chronic, stable. Troponin delta -8 on admission.     Continue home statin  ASA held due to thrombocytopenia    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 disease (HCC)  Assessment & Plan  Lab Results   Component Value Date    HGBA1C 5.4 08/15/2024   Chronic, stable. Home med includes metformin 500mg daily and gabapentin 300mg tid  Hold home metformin, continue gabapentin  ISS & Accuchecks ACHS  Hypoglycemic protocol     COPD (chronic obstructive pulmonary disease) (HCC)  Assessment & Plan  Chronic, smokes 1ppd. No PFT documented.   Continue home Breo inhaler  Recommend OP PFT  Nicotine patch  Naltrexone 50mg daily         Fluids: lactated ringers    Nutrition:        Diet Orders   (From admission, onward)                 Start     Ordered    09/14/24 1541  Room Service  Once        Question:  Type of Service  Answer:  Room Service - Appropriate with Assistance    09/14/24 1540    09/14/24 1537  Diet Regular; Regular House  Diet effective now        References:    Adult Nutrition Support Algorithm    RD Therapeutic Diet Order Protocol   Question Answer Comment   Diet Type Regular    Regular Regular House    RD to adjust diet per protocol? Yes        09/14/24 1536                   VTE Prophylaxis:   High Risk (Score >/= 5) - Pharmacological DVT Prophylaxis Ordered: apixaban (Eliquis). Sequential Compression Devices Ordered.  Code Status: Prior  Anticipated Length of Stay:  Patient will be admitted on an Inpatient basis with an anticipated length of stay of  more than 2 midnights.     Justification for Hospital Stay: alcohol withdrawal  Total Time for Visit, including Counseling / Coordination of Care: Greater than 50% of this total time spent on direct patient counseling and coordination of care.  Patient Information Sharing: With the consent of Gregg Partida, their loved ones were notified today by inpatient team of the patient’s condition and current plan. All  questions answered.    Chief Complaint:     Chief Complaint   Patient presents with    Weakness - Generalized     Pt comes with hx of alcohol abuse. Per pt he has had no alcohol for the last four days. Pt has not eaten for four days. C/o diarrhea, weakness, SOB, and reports urine is brown and intermittent burning upon urination with little output. Several bruises noted to back and shoulder. Pt reports several falls but doesn't know when. C/o left sided chest pain that began when the pt quit drinking alcohol.        Subjective      History of Present Illness:     Gregg Partida is a 64 y.o. male PMHx of alcohol withdrawal syndrome, COPD, chronic HF with preserved EF, hypothyroidism, afib, HTN, depression, and GERD who presents with inability to walk today. He was unstable on his feet today. The last time he drank alcohol (quart vodka)was Wednesday or Thursday, but he is having trouble remembering exact days and events. He is unsure which medications he took yesterday because he is having trouble paying. He has been falling a lot but cannot recall the exact days and times he falls. He is unsure whether he hit his head or if he lost consciousness.  Today his shortness of breath is worse than usual. He smokes 1 pack of cigarettes a day currently. He has chest pain in the left bottom of his chest sometimes, but he cannot recall when. Today he denies chest pain. He had diarrhea on Wednesday without vomiting. Dark urine last few days.     Review of Systems:  Review of Systems   Constitutional:  Negative for chills and fever.   HENT:  Negative for ear pain and sore throat.    Eyes:  Negative for pain and visual disturbance.   Respiratory:  Positive for shortness of breath. Negative for cough.    Cardiovascular:  Negative for chest pain.   Gastrointestinal:  Negative for abdominal pain and vomiting.   Genitourinary:  Negative for dysuria.        Black urine   Musculoskeletal:  Negative for arthralgias and back pain.   Skin:   Positive for wound. Negative for color change and rash.   Neurological:  Negative for syncope.   Psychiatric/Behavioral:  Negative for agitation.    All other systems reviewed and are negative.       Past Medical History:   Diagnosis Date    Acute on chronic kidney failure  (HCC)     Alcohol withdrawal (HCC) 06/07/2019    Atrial fibrillation (HCC)     Cancer (HCC)     prostate ca,had radiation    Cardiac disease     stents,then triple bypass    COPD (chronic obstructive pulmonary disease) (HCC)     Coronary artery disease     ETOH abuse     Heart failure (HCC)     History of heart surgery     says triple bypass [facility]    Hx of heart artery stent     2014    Hyperlipidemia     Hypertension     Hyponatremia 10/17/2019    Hypovolemic shock ([facility]) 12/22/2019    Lumbar spondylitis ([facility]) 10/13/2022    Metabolic acidosis, increased anion gap 04/21/2021    Nasal bone fracture 10/10/2022    Prostate CA ([facility])     S/P CABG x 3     2004    Sleep apnea      Past Surgical History:   Procedure Laterality Date    CARDIAC CATHETERIZATION      2 stents    CORONARY ARTERY BYPASS GRAFT  2004    NM ARTHRD ANT INTERBODY MIN DSC CRV BELOW C2 N/A 12/16/2020    Procedure: Anterior cervical discectomy with fusion C4-C7; Posterior cervical decompression and fusion C2-T2;  Surgeon: David Rowell MD;  Location: [facility];  Service: Neurosurgery    TONSILLECTOMY       No Known Allergies  Prior to Admission Medications   Prescriptions Last Dose Informant Patient Reported? Taking?   Blood Glucose Monitoring Suppl (ONE TOUCH ULTRA MINI) w/Device KIT Not Taking Self Yes No   Sig: Use as directed   Patient not taking: Reported on 3/25/2024   Blood Pressure Monitoring (Adult Blood Pressure Cuff Lg) KIT Not Taking  No No   Sig: Use in the morning   Patient not taking: Reported on 3/25/2024   Breo Ellipta 200-25 MCG/ACT inhaler Not Taking Self No No   Sig: Inhale 1 puff daily Rinse mouth after use.   Patient not taking: Reported on 3/25/2024    ONETOUCH DELICA LANCETS FINE MISC Unknown Self Yes No   Sig: 3 (three) times a day Test   Thiamine HCl (vitamin B-1) 100 MG TABS Not Taking Self No No   Sig: TAKE 1 TABLET DAILY   Patient not taking: Reported on 9/14/2024   albuterol (Ventolin HFA) 90 mcg/act inhaler Not Taking Self No No   Sig: Inhale 2 puffs every 4 (four) hours as needed for wheezing   Patient not taking: Reported on 3/25/2024   aluminum-magnesium hydroxide-simethicone (MAALOX) 9938-0199-961 mg/30 mL suspension Not Taking  No No   Sig: Take 30 mL by mouth every 4 (four) hours as needed for indigestion or heartburn   Patient not taking: Reported on 9/14/2024   apixaban (Eliquis) 5 mg 9/14/2024 at 0100  No Yes   Sig: TAKE 1 TABLET BY MOUTH 2 TIMES A DAY DO NOT START BEFORE JANUARY 31, 2024.   aspirin (ECOTRIN LOW STRENGTH) 81 mg EC tablet 9/13/2024 at 1000 Self Yes Yes   Sig: Take 81 mg by mouth daily   ferrous sulfate 325 (65 Fe) mg tablet Unknown Self No No   Sig: Take 1 tablet (325 mg total) by mouth daily with breakfast   folic acid (FOLVITE) 1 mg tablet Unknown  No No   Sig: TAKE 1 TABLET DAILY   gabapentin (NEURONTIN) 300 mg capsule Unknown  No No   Sig: TAKE ONE CAPSULE THREE TIMES DAILY   magnesium Oxide (MAG-OX) 400 mg TABS Unknown  No No   Sig: Take 1 tablet (400 mg total) by mouth 2 (two) times a day   magnesium oxide (MAG-OX) 400 mg tablet Unknown  No No   Sig: Take 1 tablet (400 mg total) by mouth daily   metFORMIN (GLUCOPHAGE) 500 mg tablet Unknown  No No   Sig: TAKE 1 TABLET DAILY WITH BREAKFAST   metoprolol tartrate (LOPRESSOR) 50 mg tablet Unknown  No No   Sig: Take 1 tablet (50 mg total) by mouth every 12 (twelve) hours   naltrexone (REVIA) 50 mg tablet Unknown  No No   Sig: Take 1 tablet (50 mg total) by mouth daily   nicotine (NICODERM CQ) 21 mg/24 hr TD 24 hr patch Not Taking Self No No   Sig: Place 1 patch on the skin over 24 hours daily Do not start before December 4, 2023.   Patient not taking: Reported on 3/25/2024    pantoprazole (PROTONIX) 40 mg tablet Unknown  No No   Sig: TAKE 1 TABLET TWO TIMES A DAY   pravastatin (PRAVACHOL) 40 mg tablet Unknown Self No No   Sig: TAKE 1 TABLET DAILY WITH DINNER   ranolazine (RANEXA) 500 mg 12 hr tablet Unknown  No No   Sig: TAKE 1 TABLET EVERY 12 HOURS   senna-docusate sodium (SENOKOT S) 8.6-50 mg per tablet Not Taking  No No   Sig: Take 1 tablet by mouth daily as needed for constipation   Patient not taking: Reported on 3/25/2024   tamsulosin (FLOMAX) 0.4 mg Unknown  No No   Sig: TAKE 1 CAPSULE DAILY   varenicline (CHANTIX) 1 mg tablet Unknown  No No   Sig: TAKE 1 TABLET 2 TIMES A DAY      Facility-Administered Medications: None     Social History     Socioeconomic History    Marital status: Single     Spouse name: Not on file    Number of children: Not on file    Years of education: Not on file    Highest education level: Not on file   Occupational History    Occupation: contractor, 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)   Tobacco Use    Smoking status: Every Day     Current packs/day: 1.50     Average packs/day: 1.5 packs/day for 40.0 years (60.0 ttl pk-yrs)     Types: Cigarettes    Smokeless tobacco: Never   Vaping Use    Vaping status: Never Used   Substance and Sexual Activity    Alcohol use: Yes     Alcohol/week: 4.0 standard drinks of alcohol     Types: 4 Standard drinks or equivalent per week     Comment: quart of vodka daily    Drug use: No    Sexual activity: Not Currently   Other Topics Concern    Not on file   Social History Narrative    Not on file     Social Determinants of Health     Financial Resource Strain: Low Risk  (6/4/2024)    Overall Financial Resource Strain (CARDIA)     Difficulty of Paying Living Expenses: Not very hard   Food Insecurity: No Food Insecurity (8/15/2024)    Hunger Vital Sign     Worried About Running Out of Food in the Last Year: Never true     Ran Out of Food in the Last Year: Never true   Transportation Needs: No Transportation Needs  "(8/15/2024)    PRAPARE - Transportation     Lack of Transportation (Medical): No     Lack of Transportation (Non-Medical): No   Physical Activity: Not on file   Stress: No Stress Concern Present (11/2/2023)    [employer] [place] [employer] - Occupational Stress Questionnaire     Feeling of Stress : Not at all   Social Connections: Unknown (4/15/2021)    Social Connection and Isolation Panel [NHANES]     Frequency of Communication with Friends and Family: More than three times a week     Frequency of Social Gatherings with Friends and Family: Not on file     Attends Alevism Services: Not on file     Active Member of Clubs or Organizations: Not on file     Attends Club or Organization Meetings: Not on file     Marital Status: Not on file   Intimate Partner Violence: Not on file   Housing Stability: Low Risk  (8/15/2024)    Housing Stability Vital Sign     Unable to Pay for Housing in the Last Year: No     Number of Times Moved in the Last Year: 0     Homeless in the Last Year: No     Family History   Problem Relation Age of Onset    Diabetes Mother     Uterine cancer Mother     COPD Father     Hypertension Father           Patient Pre-hospital Diet Restrictions: Patient only wants normal diet. Does not want restricted diet          Objective     Physical Exam:   Vitals:   Blood Pressure: 123/54 (09/14/24 1726)  Pulse: 91 (09/14/24 1726)  Temperature: 98.4 °F (36.9 °C) (09/14/24 1726)  Temp Source: Oral (09/14/24 1603)  Respirations: 17 (09/14/24 1726)  Height: 6' 2\" (188 cm) (09/14/24 1603)  Weight - Scale: 83.3 kg (183 lb 10.3 oz) (09/14/24 1120)  SpO2: 94 % (09/14/24 1726)     Physical Exam  Constitutional:       General: He is not in acute distress.     Appearance: Normal appearance. He is normal weight. He is not toxic-appearing or diaphoretic.   HENT:      Head: Normocephalic and atraumatic.      Right Ear: External ear normal.      Left Ear: External ear normal.      Nose: No congestion or " rhinorrhea.      Mouth/Throat:      Mouth: Mucous membranes are moist.   Eyes:      General:         Right eye: No discharge.         Left eye: No discharge.   Cardiovascular:      Rate and Rhythm: Normal rate and regular rhythm.      Heart sounds: Normal heart sounds. No murmur heard.     No friction rub. No gallop.   Pulmonary:      Effort: Pulmonary effort is normal. No respiratory distress.      Breath sounds: No stridor. No wheezing, rhonchi or rales.   Abdominal:      Tenderness: There is no abdominal tenderness.   Musculoskeletal:      Cervical back: No rigidity.      Right lower leg: No edema.      Left lower leg: No edema.   Skin:     Coloration: Skin is not jaundiced.      Findings: Bruising (Large hematoma on back and bleeding wounds on R arm and leg) and lesion (bleeding on his arms and legs from falls he cannot remember clearly) present.   Neurological:      General: No focal deficit present.      Mental Status: He is alert and oriented to person, place, and time.      Motor: Weakness (tremulous during pronator drift test, shaking despite full ROM elbow flexion/extension and knee flexion/extension. patient says too weak to walk) present.   Psychiatric:         Mood and Affect: Mood normal.         Behavior: Behavior normal.         Thought Content: Thought content normal.         Judgment: Judgment normal.            Lab Results: Reviewed radiology reports from this admission including: CT chest and CT head.  Results from last 7 days   Lab Units 09/14/24  1129   WBC Thousand/uL 7.12   HEMOGLOBIN g/dL 12.8   HEMATOCRIT % 38.0   PLATELETS Thousands/uL 57*   SEGS PCT % 58   LYMPHO PCT % 25   MONO PCT % 12   EOS PCT % 3     Results from last 7 days   Lab Units 09/14/24  1129   POTASSIUM mmol/L 3.8   CHLORIDE mmol/L 84*   CO2 mmol/L 19*   BUN mg/dL 29*   CREATININE mg/dL 1.41*   CALCIUM mg/dL 8.4   ALK PHOS U/L 99   ALT U/L 44   AST U/L 66*   EGFR ml/min/1.73sq m 53   MAGNESIUM mg/dL 1.0*   PHOSPHORUS mg/dL  2.6     Results from last 7 days   Lab Units 09/14/24  1129   INR  1.07                  Results from last 7 days   Lab Units 09/14/24  1430   COLOR UA  Light Orange   CLARITY UA  Slightly Cloudy   SPEC GRAV UA  1.020   PH UA  5.5   LEUKOCYTES UA  Trace*   NITRITE UA  Negative   GLUCOSE UA mg/dl Negative   KETONES UA mg/dl 40 (2+)*   BILIRUBIN UA  Small*   BLOOD UA  Moderate*      Results from last 7 days   Lab Units 09/14/24  1430   RBC UA /hpf 0-1   WBC UA /hpf 0-5   EPITHELIAL CELLS WET PREP /hpf Moderate*   BACTERIA UA /hpf Moderate*      Results from last 7 days   Lab Units 09/14/24  1546 09/14/24  1327 09/14/24  1129   HS TNI 0HR ng/L  --   --  54*   HS TNI 2HR ng/L  --  46  --    HS TNI 4HR ng/L 46  --   --       Results from last 7 days   Lab Units 09/14/24  1129   CK TOTAL U/L 258        Imaging: Reviewed radiology reports from this admission including: CT chest and CT head.  CT chest wo contrast    Result Date: 9/14/2024  No acute left rib fracture is seen. Old right rib fractures. The study was marked in EPIC for immediate notification. Workstation performed: [id number]     CT spine cervical without contrast    Result Date: 9/14/2024  No cervical spine fracture identified. Postsurgical changes from anterior and posterior cervical spinal fusion procedures with laminectomies as described above. Lucency surrounding the bilateral C6 lateral mass screws again mistreated indicative of loosening. The right vertical valorie appears mildly dislodged from the right T2 transpedicular screw, grossly unchanged. Stable right posterior paraspinal heterogeneous density chronic seroma/hematoma at the level of the posterior cervical spinal fusion. Workstation performed: [id number]     CT head without contrast    Result Date: 9/14/2024  No acute intracranial abnormality. Workstation performed: [id number]         EKG, Pathology, and Other Studies Reviewed on Admission:   EKG  Result Date: 09/14/24  Impression:  Sinus rhythm with  PACs. Possible left atrial enlargement. When compared to EKG August 15, 2024, Vent rate decreased.        ** Please Note: This note has been constructed using a voice recognition system **     Lilliana Bliss MD  09/14/24  5:55 PM

## 2024-09-14 NOTE — ASSESSMENT & PLAN NOTE
Chronic, smokes 1ppd. No PFT documented.     Continue home Breo inhaler  Continue albuterol inhaler PRN   Recommend OP PFT  Nicotine patch

## 2024-09-14 NOTE — ASSESSMENT & PLAN NOTE
Wt Readings from Last 3 Encounters:   09/14/24 83.3 kg (183 lb 10.3 oz)   08/16/24 85.7 kg (189 lb)   06/04/24 91.2 kg (201 lb)   Chronic, hypovolemic on presentation. Denies chest pain or SOB.    Continue home Metoprolol & Ranexa

## 2024-09-15 LAB
ANION GAP SERPL CALCULATED.3IONS-SCNC: 10 MMOL/L (ref 4–13)
BASOPHILS # BLD AUTO: 0.03 THOUSANDS/ΜL (ref 0–0.1)
BASOPHILS NFR BLD AUTO: 1 % (ref 0–1)
BUN SERPL-MCNC: 36 MG/DL (ref 5–25)
CALCIUM SERPL-MCNC: 8.5 MG/DL (ref 8.4–10.2)
CHLORIDE SERPL-SCNC: 93 MMOL/L (ref 96–108)
CO2 SERPL-SCNC: 26 MMOL/L (ref 21–32)
CREAT SERPL-MCNC: 1.42 MG/DL (ref 0.6–1.3)
EOSINOPHIL # BLD AUTO: 0.14 THOUSAND/ΜL (ref 0–0.61)
EOSINOPHIL NFR BLD AUTO: 3 % (ref 0–6)
ERYTHROCYTE [DISTWIDTH] IN BLOOD BY AUTOMATED COUNT: 14.7 % (ref 11.6–15.1)
GFR SERPL CREATININE-BSD FRML MDRD: 52 ML/MIN/1.73SQ M
GLUCOSE SERPL-MCNC: 129 MG/DL (ref 65–140)
GLUCOSE SERPL-MCNC: 138 MG/DL (ref 65–140)
GLUCOSE SERPL-MCNC: 157 MG/DL (ref 65–140)
GLUCOSE SERPL-MCNC: 160 MG/DL (ref 65–140)
GLUCOSE SERPL-MCNC: 180 MG/DL (ref 65–140)
HCT VFR BLD AUTO: 35.2 % (ref 36.5–49.3)
HGB BLD-MCNC: 11.7 G/DL (ref 12–17)
IMM GRANULOCYTES # BLD AUTO: 0.02 THOUSAND/UL (ref 0–0.2)
IMM GRANULOCYTES NFR BLD AUTO: 0 % (ref 0–2)
LYMPHOCYTES # BLD AUTO: 1.01 THOUSANDS/ΜL (ref 0.6–4.47)
LYMPHOCYTES NFR BLD AUTO: 19 % (ref 14–44)
MAGNESIUM SERPL-MCNC: 1.5 MG/DL (ref 1.9–2.7)
MCH RBC QN AUTO: 31.9 PG (ref 26.8–34.3)
MCHC RBC AUTO-ENTMCNC: 33.2 G/DL (ref 31.4–37.4)
MCV RBC AUTO: 96 FL (ref 82–98)
MONOCYTES # BLD AUTO: 0.71 THOUSAND/ΜL (ref 0.17–1.22)
MONOCYTES NFR BLD AUTO: 13 % (ref 4–12)
NEUTROPHILS # BLD AUTO: 3.44 THOUSANDS/ΜL (ref 1.85–7.62)
NEUTS SEG NFR BLD AUTO: 64 % (ref 43–75)
NRBC BLD AUTO-RTO: 0 /100 WBCS
PLATELET # BLD AUTO: 54 THOUSANDS/UL (ref 149–390)
PMV BLD AUTO: 12 FL (ref 8.9–12.7)
POTASSIUM SERPL-SCNC: 4.4 MMOL/L (ref 3.5–5.3)
RBC # BLD AUTO: 3.67 MILLION/UL (ref 3.88–5.62)
SODIUM SERPL-SCNC: 129 MMOL/L (ref 135–147)
WBC # BLD AUTO: 5.35 THOUSAND/UL (ref 4.31–10.16)

## 2024-09-15 PROCEDURE — 82948 REAGENT STRIP/BLOOD GLUCOSE: CPT

## 2024-09-15 PROCEDURE — 80048 BASIC METABOLIC PNL TOTAL CA: CPT

## 2024-09-15 PROCEDURE — 85025 COMPLETE CBC W/AUTO DIFF WBC: CPT

## 2024-09-15 PROCEDURE — 83735 ASSAY OF MAGNESIUM: CPT

## 2024-09-15 PROCEDURE — 99232 SBSQ HOSP IP/OBS MODERATE 35: CPT | Performed by: STUDENT IN AN ORGANIZED HEALTH CARE EDUCATION/TRAINING PROGRAM

## 2024-09-15 RX ORDER — MAGNESIUM SULFATE HEPTAHYDRATE 40 MG/ML
4 INJECTION, SOLUTION INTRAVENOUS ONCE
Status: COMPLETED | OUTPATIENT
Start: 2024-09-15 | End: 2024-09-15

## 2024-09-15 RX ADMIN — RANOLAZINE 500 MG: 500 TABLET, FILM COATED, EXTENDED RELEASE ORAL at 15:35

## 2024-09-15 RX ADMIN — FOLIC ACID 1 MG: 1 TABLET ORAL at 08:54

## 2024-09-15 RX ADMIN — RANOLAZINE 500 MG: 500 TABLET, FILM COATED, EXTENDED RELEASE ORAL at 03:39

## 2024-09-15 RX ADMIN — SODIUM CHLORIDE, SODIUM LACTATE, POTASSIUM CHLORIDE, AND CALCIUM CHLORIDE 100 ML/HR: .6; .31; .03; .02 INJECTION, SOLUTION INTRAVENOUS at 01:39

## 2024-09-15 RX ADMIN — MAGNESIUM SULFATE HEPTAHYDRATE 4 G: 40 INJECTION, SOLUTION INTRAVENOUS at 18:15

## 2024-09-15 RX ADMIN — APIXABAN 5 MG: 5 TABLET, FILM COATED ORAL at 17:34

## 2024-09-15 RX ADMIN — FERROUS SULFATE TAB 325 MG (65 MG ELEMENTAL FE) 325 MG: 325 (65 FE) TAB at 08:53

## 2024-09-15 RX ADMIN — PRAVASTATIN SODIUM 40 MG: 40 TABLET ORAL at 15:35

## 2024-09-15 RX ADMIN — GABAPENTIN 300 MG: 300 CAPSULE ORAL at 15:35

## 2024-09-15 RX ADMIN — Medication 400 MG: at 08:53

## 2024-09-15 RX ADMIN — GABAPENTIN 300 MG: 300 CAPSULE ORAL at 21:19

## 2024-09-15 RX ADMIN — THIAMINE HCL TAB 100 MG 100 MG: 100 TAB at 08:53

## 2024-09-15 RX ADMIN — METOPROLOL TARTRATE 50 MG: 50 TABLET, FILM COATED ORAL at 21:19

## 2024-09-15 RX ADMIN — TAMSULOSIN HYDROCHLORIDE 0.4 MG: 0.4 CAPSULE ORAL at 08:54

## 2024-09-15 RX ADMIN — Medication 1 TABLET: at 08:53

## 2024-09-15 RX ADMIN — METOPROLOL TARTRATE 50 MG: 50 TABLET, FILM COATED ORAL at 08:54

## 2024-09-15 RX ADMIN — APIXABAN 5 MG: 5 TABLET, FILM COATED ORAL at 08:54

## 2024-09-15 RX ADMIN — NICOTINE 1 PATCH: 21 PATCH, EXTENDED RELEASE TRANSDERMAL at 08:53

## 2024-09-15 RX ADMIN — SODIUM CHLORIDE, SODIUM LACTATE, POTASSIUM CHLORIDE, AND CALCIUM CHLORIDE 100 ML/HR: .6; .31; .03; .02 INJECTION, SOLUTION INTRAVENOUS at 16:43

## 2024-09-15 RX ADMIN — FLUTICASONE FUROATE AND VILANTEROL TRIFENATATE 1 PUFF: 200; 25 POWDER RESPIRATORY (INHALATION) at 08:58

## 2024-09-15 RX ADMIN — Medication 400 MG: at 17:34

## 2024-09-15 RX ADMIN — INSULIN LISPRO 1 UNITS: 100 INJECTION, SOLUTION INTRAVENOUS; SUBCUTANEOUS at 11:54

## 2024-09-15 RX ADMIN — PANTOPRAZOLE SODIUM 40 MG: 40 TABLET, DELAYED RELEASE ORAL at 15:36

## 2024-09-15 RX ADMIN — PANTOPRAZOLE SODIUM 40 MG: 40 TABLET, DELAYED RELEASE ORAL at 06:06

## 2024-09-15 RX ADMIN — INSULIN LISPRO 1 UNITS: 100 INJECTION, SOLUTION INTRAVENOUS; SUBCUTANEOUS at 21:19

## 2024-09-15 RX ADMIN — GABAPENTIN 300 MG: 300 CAPSULE ORAL at 08:54

## 2024-09-15 RX ADMIN — NALTREXONE HYDROCHLORIDE 50 MG: 50 TABLET, FILM COATED ORAL at 08:57

## 2024-09-15 RX ADMIN — INSULIN LISPRO 1 UNITS: 100 INJECTION, SOLUTION INTRAVENOUS; SUBCUTANEOUS at 16:52

## 2024-09-15 NOTE — PLAN OF CARE
Problem: PAIN - ADULT  Goal: Verbalizes/displays adequate comfort level or baseline comfort level  Description: Interventions:  - Encourage patient to monitor pain and request assistance  - Assess pain using appropriate pain scale  - Administer analgesics based on type and severity of pain and evaluate response  - Implement non-pharmacological measures as appropriate and evaluate response  - Consider cultural and social influences on pain and pain management  - Notify physician/advanced practitioner if interventions unsuccessful or patient reports new pain  Outcome: Progressing     Problem: INFECTION - ADULT  Goal: Absence or prevention of progression during hospitalization  Description: INTERVENTIONS:  - Assess and monitor for signs and symptoms of infection  - Monitor lab/diagnostic results  - Monitor all insertion sites, i.e. indwelling lines, tubes, and drains  - Monitor endotracheal if appropriate and nasal secretions for changes in amount and color  - [place] appropriate cooling/warming therapies per order  - Administer medications as ordered  - Instruct and encourage patient and family to use good hand hygiene technique  - Identify and instruct in appropriate isolation precautions for identified infection/condition  Outcome: Progressing  Goal: Absence of fever/infection during neutropenic period  Description: INTERVENTIONS:  - Monitor WBC    Outcome: Progressing     Problem: SAFETY ADULT  Goal: Patient will remain free of falls  Description: INTERVENTIONS:  - Educate patient/family on patient safety including physical limitations  - Instruct patient to call for assistance with activity   - Consult OT/PT to assist with strengthening/mobility   - Keep Call bell within reach  - Keep bed low and locked with side rails adjusted as appropriate  - Keep care items and personal belongings within reach  - Initiate and maintain comfort rounds  - Make Fall Risk Sign visible to staff  - Offer Toileting every 2 Hours,  in advance of need  - Initiate/Maintain bed alarm  - Obtain necessary fall risk management equipment: call bell   - Apply yellow socks and bracelet for high fall risk patients  - Consider moving patient to room near nurses station  Outcome: Progressing  Goal: Maintain or return to baseline ADL function  Description: INTERVENTIONS:  -  Assess patient's ability to carry out ADLs; assess patient's baseline for ADL function and identify physical deficits which impact ability to perform ADLs (bathing, care of mouth/teeth, toileting, grooming, dressing, etc.)  - Assess/evaluate cause of self-care deficits   - Assess range of motion  - Assess patient's mobility; develop plan if impaired  - Assess patient's need for assistive devices and provide as appropriate  - Encourage maximum independence but intervene and supervise when necessary  - Involve family in performance of ADLs  - Assess for home care needs following discharge   - Consider OT consult to assist with ADL evaluation and planning for discharge  - Provide patient education as appropriate  Outcome: Progressing  Goal: Maintains/Returns to pre admission functional level  Description: INTERVENTIONS:  - Perform AM-PAC 6 Click Basic Mobility/ Daily Activity assessment daily.  - Set and communicate daily mobility goal to care team and patient/family/caregiver.   - Collaborate with rehabilitation services on mobility goals if consulted  - Perform Range of Motion 3 times a day.  - Reposition patient every 2 hours.  - Dangle patient 3 times a day  - Stand patient 3 times a day  - Ambulate patient 3 times a day  - Out of bed to chair 3 times a day   - Out of bed for meals 3 times a day  - Out of bed for toileting  - Record patient progress and toleration of activity level   Outcome: Progressing     Problem: DISCHARGE PLANNING  Goal: Discharge to home or other facility with appropriate resources  Description: INTERVENTIONS:  - Identify barriers to discharge w/patient and  caregiver  - Arrange for needed discharge resources and transportation as appropriate  - Identify discharge learning needs (meds, wound care, etc.)  - Arrange for interpretive services to assist at discharge as needed  - Refer to Case Management Department for coordinating discharge planning if the patient needs post-hospital services based on physician/advanced practitioner order or complex needs related to functional status, cognitive ability, or social support system  Outcome: Progressing     Problem: Knowledge Deficit  Goal: Patient/family/caregiver demonstrates understanding of disease process, treatment plan, medications, and discharge instructions  Description: Complete learning assessment and assess knowledge base.  Interventions:  - Provide teaching at level of understanding  - Provide teaching via preferred learning methods  Outcome: Progressing

## 2024-09-15 NOTE — PROGRESS NOTES
Progress Note - Family Medicine   Name: Gregg Partida 64 y.o. male I MRN: [id number]  Unit/Bed#: 4 [place] 407-01 I Date of Admission: 9/14/2024   Date of Service: 9/15/2024 I Hospital Day: 1     Assessment & Plan  Ambulatory dysfunction  Presents with complaint of lower extremity weakness and multiple recent falls in setting of chronic alcohol abuse with severe deconditioning and electrolyte abnormalities. Large hematoma on back and bleeding wounds on R arm and leg noted on initial exam, reports last fall was 3-4 days ago when he last drank approx 1 pint of Vodka. Denies head trauma or LOC. Reports mostly staying in bed since last discharge.     CT chest No acute left rib fracture is seen. Old right rib fractures.   CT cervical spine No cervical spine fracture identified.   CT head No acute intracranial abnormality.     PT/OT, pending recommendations   Fall, CIWA, & Seizure precautions  CM consult  Electrolyte abnormality  Chronic, POA Na 127 & Mg 1.0 likely 2/2 chronic alcohol abuse and poor PO intake. Denies dizziness/lightheadedness, NV.  - ED: 1L NS bolus     Continue home mag ox 400mg BID  Trend daily, replete as needed  Telemetry 24hr  IVF  COPD (chronic obstructive pulmonary disease) (HCC)  Chronic, smokes 1ppd. No PFT documented.     Continue home Breo inhaler  Continue albuterol inhaler PRN   Recommend OP PFT  Nicotine patch  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 disease (HCC)  Lab Results   Component Value Date    HGBA1C 5.4 08/15/2024     Recent Labs     09/14/24  1658 09/14/24  2106 09/15/24  0709 09/15/24  1142   POCGLU 97 137 129 157*   Blood glucose average over last 72 hours: (P) 130    Chronic, stable. Home med includes metformin 500mg daily and gabapentin 300mg tid    Hold home metformin, continue gabapentin  ISS & Accuchecks ACHS  Hypoglycemic protocol   Hx of CABG  Chronic, stable. Troponin delta -8 on admission.  LAZARA score 4. Heart score 2.      Continue home statin  ASA held  due to thrombocytopenia  Dyslipidemia  Chronic, stable. Home med: Pravastatin 40mg  Last lipid panel 11 months ago: Cholesterol 190, TG 71,      Continue home pravastatin   Thrombocytopenia (HCC)  Chronic in setting of alcoholic cirrhosis.   - POA Plt 57    Trend daily  Holding home ASA  Monitor for signs of bleeding  Continue home Eliquis for A-fib. Benefit vs risk in setting of thrombocytopenia   Chronic heart failure with preserved ejection fraction (HCC)  Wt Readings from Last 3 Encounters:   09/14/24 83.3 kg (183 lb 10.3 oz)   08/16/24 85.7 kg (189 lb)   06/04/24 91.2 kg (201 lb)   Chronic, hypovolemic on presentation. Denies chest pain or SOB.    Continue home Metoprolol & Ranexa  A-fib (HCC)  Chronic stable.  - EKG: NSR with PAC    Continue home metoprolol and eliquis  Metabolic acidosis, increased anion gap  POA AG 24 in setting of alcohol use.     Monitor BMP   ml/hr   Stage 3a chronic kidney disease (HCC)  Lab Results   Component Value Date    EGFR 52 09/15/2024    EGFR 53 09/14/2024    EGFR 60 08/17/2024    CREATININE 1.42 (H) 09/15/2024    CREATININE 1.41 (H) 09/14/2024    CREATININE 1.26 08/17/2024   Chronic  Elevated Cr on presentation in setting of poor nutrition/hydration and alcohol abuse. Not ENMA per KDIGO.    Trend Daily  IVF  GERD (gastroesophageal reflux disease)  Chronic, stable. Home med protonix 40mg bid.    Continue home Protonix   Hypertension  Chronic, BP soft on admission. Home medication metoprolol 50 mg twice daily.     Continue home medication  Financial insecurity  Reports noncompliance with medication due to inability to pay for them. Reports attempting to reach his son for assistance after recent discharge with no success.  Referral to CM  Discussed the possibility of STR for PT and med management before transitioning back home, patient resistant  Iron deficiency  Chronic, stable. Home meds: Ferrous sulfate 325mg daily.    Continue home med  BPH (benign prostatic  hyperplasia)  Chronic,stable. Home meds include tamsulosin 0.4mg daily    Continue home tamsulosin   Chronic alcohol use  Chronic. Not in acute withdrawal right now.     Continue daily: multivitamin, folic acid 1mg, thiamine 100mg  Monitor with CIWA score     VTE Pharmacologic Prophylaxis: VTE Score: 4 Moderate Risk (Score 3-4) - Pharmacological DVT Prophylaxis Ordered: apixaban (Eliquis).    Mobility:   Basic Mobility Inpatient Raw Score: 23  JH-HLM Goal: 7: Walk 25 feet or more  JH-HLM Achieved: 7: Walk 25 feet or more  JH-HLM Goal achieved. Continue to encourage appropriate mobility.    Patient Centered Rounds: I performed bedside rounds with nursing staff today.     Current Length of Stay: 1 day(s)  Current Patient Status: Inpatient   Certification Statement: The patient will continue to require additional inpatient hospital stay due to ambulatory dysfunction, CIWA monitoring   Discharge Plan: Anticipate discharge in 24-48 hrs to home.    Code Status: Prior    Subjective   Patient was seen and examined at bedside. He reports a headache this morning but does not want any medication or ice/heat packs for it. He also notes some intermittent chest pain and palpitations this morning. He states the chest pain and palpitations have been on/off for the past 5 days, even at rest.   At this time he denies any dizziness, shortness of breath, abdominal pain, nausea, vomiting.   Patient has been on telemetry. On review, no events aside from mild tachycardia last night.     Objective     Vitals:   Temp (24hrs), Av.1 °F (36.7 °C), Min:97.8 °F (36.6 °C), Max:98.4 °F (36.9 °C)    Temp:  [97.8 °F (36.6 °C)-98.4 °F (36.9 °C)] 97.8 °F (36.6 °C)  HR:  [58-91] 76  Resp:  [16-18] 16  BP: ()/(47-81) 135/72  SpO2:  [92 %-98 %] 94 %  Body mass index is 23.58 kg/m².     Input and Output Summary (last 24 hours):     Intake/Output Summary (Last 24 hours) at 9/15/2024 1429  Last data filed at 9/15/2024 0601  Gross per 24 hour    Intake --   Output 1200 ml   Net -1200 ml       Physical Exam  Constitutional:       General: He is not in acute distress.     Appearance: Normal appearance. He is normal weight. He is not toxic-appearing or diaphoretic.   HENT:      Head: Normocephalic and atraumatic.      Nose: No congestion or rhinorrhea.      Mouth/Throat:      Mouth: Mucous membranes are moist.   Eyes:      Extraocular Movements: Extraocular movements intact.   Cardiovascular:      Rate and Rhythm: Normal rate and regular rhythm.      Heart sounds: Normal heart sounds. No murmur heard.  Pulmonary:      Effort: Pulmonary effort is normal. No respiratory distress.   Abdominal:      General: Bowel sounds are normal.      Palpations: Abdomen is soft. There is no mass.      Tenderness: There is no abdominal tenderness.   Musculoskeletal:      Right lower leg: No edema.      Left lower leg: No edema.   Skin:     Coloration: Skin is not jaundiced.      Findings: Bruising and wound present.      Comments: Hematoma on back.   Wounds with dried blood noted on RUE and RLE. In healing process, no signs of infection    Neurological:      General: No focal deficit present.      Mental Status: He is alert and oriented to person, place, and time.      GCS: GCS eye subscore is 4. GCS verbal subscore is 5. GCS motor subscore is 6.      Motor: Tremor present.      Comments: CIWA 6 during exam    Psychiatric:         Mood and Affect: Mood normal.         Behavior: Behavior normal.          Lines/Drains:  Lines/Drains/Airways       Active Status       None                      Telemetry:  Telemetry Orders (From admission, onward)               24 Hour Telemetry Monitoring  Continuous x 24 Hours ([place])        Expiring   Question:  Reason for 24 Hour Telemetry  Answer:  Alcohol withdrawal and CIWA >7, electrolyte abnormalities, abnormal ECG and/or heart disease                     Telemetry Reviewed: Sinus Tachycardia  Indication for Continued Telemetry Use:  Metabolic/electrolyte disturbance with high probability of dysrhythmia               Lab Results: I have reviewed the following results:    Results from last 7 days   Lab Units 09/15/24  0338   WBC Thousand/uL 5.35   HEMOGLOBIN g/dL 11.7*   HEMATOCRIT % 35.2*   PLATELETS Thousands/uL 54*   SEGS PCT % 64   LYMPHO PCT % 19   MONO PCT % 13*   EOS PCT % 3     Results from last 7 days   Lab Units 09/15/24  0338 09/14/24  1129   SODIUM mmol/L 129* 127*   POTASSIUM mmol/L 4.4 3.8   CHLORIDE mmol/L 93* 84*   CO2 mmol/L 26 19*   BUN mg/dL 36* 29*   CREATININE mg/dL 1.42* 1.41*   ANION GAP mmol/L 10 24*   CALCIUM mg/dL 8.5 8.4   ALBUMIN g/dL  --  4.1   TOTAL BILIRUBIN mg/dL  --  1.07*   ALK PHOS U/L  --  99   ALT U/L  --  44   AST U/L  --  66*   GLUCOSE RANDOM mg/dL 138 68     Results from last 7 days   Lab Units 09/14/24  1129   INR  1.07     Results from last 7 days   Lab Units 09/15/24  1142 09/15/24  0709 09/14/24  2106 09/14/24  1658   POC GLUCOSE mg/dl 157* 129 137 97               Recent Cultures (last 7 days):         Imaging Review: Reviewed radiology reports from this admission including: CT chest, CT head, and CT C-spine.  Other Studies: EKG was reviewed.     Last 24 Hours Medication List:     Current Facility-Administered Medications:     albuterol (PROVENTIL HFA,VENTOLIN HFA) inhaler 2 puff, Q4H PRN    aluminum-magnesium hydroxide-simethicone (MAALOX) oral suspension 30 mL, Q4H PRN    apixaban (ELIQUIS) tablet 5 mg, BID    ferrous sulfate tablet 325 mg, Daily With Breakfast    fluticasone-vilanterol 200-25 mcg/actuation 1 puff, Daily    folic acid (FOLVITE) tablet 1 mg, Daily    gabapentin (NEURONTIN) capsule 300 mg, TID    insulin lispro (HumALOG/ADMELOG) 100 units/mL subcutaneous injection 1-6 Units, 4x Daily (AC & HS) **AND** Fingerstick Glucose (POCT), 4x Daily AC and at bedtime    lactated ringers infusion, Continuous, Last Rate: 100 mL/hr (09/15/24 0139)    magnesium Oxide (MAG-OX) tablet 400 mg, BID     metoprolol tartrate (LOPRESSOR) tablet 50 mg, Q12H SEDA    multivitamin-minerals (CENTRUM) tablet 1 tablet, Daily    naltrexone (REVIA) tablet 50 mg, Daily    nicotine (NICODERM CQ) 21 mg/24 hr TD 24 hr patch 1 patch, Daily    pantoprazole (PROTONIX) EC tablet 40 mg, BID AC    pravastatin (PRAVACHOL) tablet 40 mg, Daily With Dinner    ranolazine (RANEXA) 12 hr tablet 500 mg, Q12H    senna-docusate sodium (SENOKOT S) 8.6-50 mg per tablet 1 tablet, Daily PRN    tamsulosin (FLOMAX) capsule 0.4 mg, Daily    thiamine tablet 100 mg, Daily    Administrative Statements   Today, Patient Was Seen By: Lois Kitchen DO      **Please Note: This note may have been constructed using a voice recognition system.**

## 2024-09-16 VITALS
WEIGHT: 183.64 LBS | OXYGEN SATURATION: 96 % | RESPIRATION RATE: 20 BRPM | HEIGHT: 74 IN | BODY MASS INDEX: 23.57 KG/M2 | TEMPERATURE: 97.6 F | DIASTOLIC BLOOD PRESSURE: 77 MMHG | SYSTOLIC BLOOD PRESSURE: 149 MMHG | HEART RATE: 76 BPM

## 2024-09-16 LAB
25(OH)D3 SERPL-MCNC: 33.1 NG/ML (ref 30–100)
ANION GAP SERPL CALCULATED.3IONS-SCNC: 7 MMOL/L (ref 4–13)
ATRIAL RATE: 88 BPM
BASOPHILS # BLD AUTO: 0.02 THOUSANDS/ΜL (ref 0–0.1)
BASOPHILS NFR BLD AUTO: 0 % (ref 0–1)
BUN SERPL-MCNC: 25 MG/DL (ref 5–25)
CALCIUM SERPL-MCNC: 8.5 MG/DL (ref 8.4–10.2)
CHLORIDE SERPL-SCNC: 96 MMOL/L (ref 96–108)
CO2 SERPL-SCNC: 27 MMOL/L (ref 21–32)
CREAT SERPL-MCNC: 1.13 MG/DL (ref 0.6–1.3)
EOSINOPHIL # BLD AUTO: 0.13 THOUSAND/ΜL (ref 0–0.61)
EOSINOPHIL NFR BLD AUTO: 2 % (ref 0–6)
ERYTHROCYTE [DISTWIDTH] IN BLOOD BY AUTOMATED COUNT: 14.8 % (ref 11.6–15.1)
FOLATE SERPL-MCNC: >22.3 NG/ML
GFR SERPL CREATININE-BSD FRML MDRD: 69 ML/MIN/1.73SQ M
GLUCOSE SERPL-MCNC: 119 MG/DL (ref 65–140)
GLUCOSE SERPL-MCNC: 126 MG/DL (ref 65–140)
GLUCOSE SERPL-MCNC: 127 MG/DL (ref 65–140)
GLUCOSE SERPL-MCNC: 227 MG/DL (ref 65–140)
HCT VFR BLD AUTO: 31.3 % (ref 36.5–49.3)
HGB BLD-MCNC: 10.6 G/DL (ref 12–17)
IMM GRANULOCYTES # BLD AUTO: 0.03 THOUSAND/UL (ref 0–0.2)
IMM GRANULOCYTES NFR BLD AUTO: 1 % (ref 0–2)
LYMPHOCYTES # BLD AUTO: 1.23 THOUSANDS/ΜL (ref 0.6–4.47)
LYMPHOCYTES NFR BLD AUTO: 22 % (ref 14–44)
MAGNESIUM SERPL-MCNC: 1.7 MG/DL (ref 1.9–2.7)
MCH RBC QN AUTO: 32.5 PG (ref 26.8–34.3)
MCHC RBC AUTO-ENTMCNC: 33.9 G/DL (ref 31.4–37.4)
MCV RBC AUTO: 96 FL (ref 82–98)
MONOCYTES # BLD AUTO: 1.17 THOUSAND/ΜL (ref 0.17–1.22)
MONOCYTES NFR BLD AUTO: 20 % (ref 4–12)
NEUTROPHILS # BLD AUTO: 3.15 THOUSANDS/ΜL (ref 1.85–7.62)
NEUTS SEG NFR BLD AUTO: 55 % (ref 43–75)
NRBC BLD AUTO-RTO: 0 /100 WBCS
P AXIS: 73 DEGREES
PLATELET # BLD AUTO: 60 THOUSANDS/UL (ref 149–390)
PMV BLD AUTO: 11.9 FL (ref 8.9–12.7)
POTASSIUM SERPL-SCNC: 4.4 MMOL/L (ref 3.5–5.3)
PR INTERVAL: 128 MS
QRS AXIS: 75 DEGREES
QRSD INTERVAL: 86 MS
QT INTERVAL: 370 MS
QTC INTERVAL: 447 MS
RBC # BLD AUTO: 3.26 MILLION/UL (ref 3.88–5.62)
SODIUM SERPL-SCNC: 130 MMOL/L (ref 135–147)
T WAVE AXIS: 51 DEGREES
TSH SERPL DL<=0.05 MIU/L-ACNC: 0.62 UIU/ML (ref 0.45–4.5)
VENTRICULAR RATE: 88 BPM
VIT B12 SERPL-MCNC: 544 PG/ML (ref 180–914)
WBC # BLD AUTO: 5.73 THOUSAND/UL (ref 4.31–10.16)

## 2024-09-16 PROCEDURE — 83735 ASSAY OF MAGNESIUM: CPT

## 2024-09-16 PROCEDURE — 80048 BASIC METABOLIC PNL TOTAL CA: CPT

## 2024-09-16 PROCEDURE — 97167 OT EVAL HIGH COMPLEX 60 MIN: CPT

## 2024-09-16 PROCEDURE — 82948 REAGENT STRIP/BLOOD GLUCOSE: CPT

## 2024-09-16 PROCEDURE — 93010 ELECTROCARDIOGRAM REPORT: CPT | Performed by: INTERNAL MEDICINE

## 2024-09-16 PROCEDURE — 84443 ASSAY THYROID STIM HORMONE: CPT

## 2024-09-16 PROCEDURE — 82306 VITAMIN D 25 HYDROXY: CPT

## 2024-09-16 PROCEDURE — 82607 VITAMIN B-12: CPT

## 2024-09-16 PROCEDURE — 99238 HOSP IP/OBS DSCHRG MGMT 30/<: CPT | Performed by: STUDENT IN AN ORGANIZED HEALTH CARE EDUCATION/TRAINING PROGRAM

## 2024-09-16 PROCEDURE — 85025 COMPLETE CBC W/AUTO DIFF WBC: CPT

## 2024-09-16 PROCEDURE — 97110 THERAPEUTIC EXERCISES: CPT

## 2024-09-16 PROCEDURE — 82746 ASSAY OF FOLIC ACID SERUM: CPT

## 2024-09-16 PROCEDURE — 97163 PT EVAL HIGH COMPLEX 45 MIN: CPT

## 2024-09-16 RX ORDER — MAGNESIUM SULFATE HEPTAHYDRATE 40 MG/ML
4 INJECTION, SOLUTION INTRAVENOUS ONCE
Status: COMPLETED | OUTPATIENT
Start: 2024-09-16 | End: 2024-09-16

## 2024-09-16 RX ADMIN — NICOTINE 1 PATCH: 21 PATCH, EXTENDED RELEASE TRANSDERMAL at 08:46

## 2024-09-16 RX ADMIN — Medication 1 TABLET: at 08:45

## 2024-09-16 RX ADMIN — NALTREXONE HYDROCHLORIDE 50 MG: 50 TABLET, FILM COATED ORAL at 18:00

## 2024-09-16 RX ADMIN — RANOLAZINE 500 MG: 500 TABLET, FILM COATED, EXTENDED RELEASE ORAL at 18:00

## 2024-09-16 RX ADMIN — PRAVASTATIN SODIUM 40 MG: 40 TABLET ORAL at 18:00

## 2024-09-16 RX ADMIN — Medication 400 MG: at 08:44

## 2024-09-16 RX ADMIN — METOPROLOL TARTRATE 50 MG: 50 TABLET, FILM COATED ORAL at 08:45

## 2024-09-16 RX ADMIN — FERROUS SULFATE TAB 325 MG (65 MG ELEMENTAL FE) 325 MG: 325 (65 FE) TAB at 08:45

## 2024-09-16 RX ADMIN — Medication 400 MG: at 18:00

## 2024-09-16 RX ADMIN — SODIUM CHLORIDE, SODIUM LACTATE, POTASSIUM CHLORIDE, AND CALCIUM CHLORIDE 100 ML/HR: .6; .31; .03; .02 INJECTION, SOLUTION INTRAVENOUS at 06:02

## 2024-09-16 RX ADMIN — APIXABAN 5 MG: 5 TABLET, FILM COATED ORAL at 18:00

## 2024-09-16 RX ADMIN — PANTOPRAZOLE SODIUM 40 MG: 40 TABLET, DELAYED RELEASE ORAL at 06:02

## 2024-09-16 RX ADMIN — INSULIN LISPRO 2 UNITS: 100 INJECTION, SOLUTION INTRAVENOUS; SUBCUTANEOUS at 12:17

## 2024-09-16 RX ADMIN — FLUTICASONE FUROATE AND VILANTEROL TRIFENATATE 1 PUFF: 200; 25 POWDER RESPIRATORY (INHALATION) at 08:45

## 2024-09-16 RX ADMIN — MAGNESIUM SULFATE HEPTAHYDRATE 4 G: 40 INJECTION, SOLUTION INTRAVENOUS at 07:42

## 2024-09-16 RX ADMIN — PANTOPRAZOLE SODIUM 40 MG: 40 TABLET, DELAYED RELEASE ORAL at 18:00

## 2024-09-16 RX ADMIN — GABAPENTIN 300 MG: 300 CAPSULE ORAL at 18:00

## 2024-09-16 RX ADMIN — APIXABAN 5 MG: 5 TABLET, FILM COATED ORAL at 08:45

## 2024-09-16 RX ADMIN — THIAMINE HCL TAB 100 MG 100 MG: 100 TAB at 08:45

## 2024-09-16 RX ADMIN — GABAPENTIN 300 MG: 300 CAPSULE ORAL at 08:45

## 2024-09-16 RX ADMIN — RANOLAZINE 500 MG: 500 TABLET, FILM COATED, EXTENDED RELEASE ORAL at 04:58

## 2024-09-16 RX ADMIN — TAMSULOSIN HYDROCHLORIDE 0.4 MG: 0.4 CAPSULE ORAL at 08:45

## 2024-09-16 RX ADMIN — NALTREXONE HYDROCHLORIDE 50 MG: 50 TABLET, FILM COATED ORAL at 08:45

## 2024-09-16 RX ADMIN — FOLIC ACID 1 MG: 1 TABLET ORAL at 08:45

## 2024-09-16 NOTE — DISCHARGE SUMMARY
Discharge Summary - Internal Medicine   Name: Gregg Partida 64 y.o. male I MRN: [id number]  Unit/Bed#: 4 [place] 407-01 I Date of Admission: 9/14/2024   Date of Service: 9/16/2024 I Hospital Day: 2     Medical Problems       Resolved Problems  Date Reviewed: 3/26/2024   None       Discharging Physician / Practitioner: Rita Hinojosa MD  PCP: Lilliana Bliss MD  Admission Date:   Admission Orders (From admission, onward)       Ordered        09/14/24 1418  INPATIENT ADMISSION  Once                          Discharge Date: 09/16/24    Consultations During Hospital Stay:  none    Procedures Performed:   none    Significant Findings / Test Results:   9/16: Hgb 10.6, Mag 1.7  9/15: Na 129, K 4.4, Cr 1.42, Mg 1.5  9/14: Na 127, K 3.8, CO2 19, AG 24, Cr 1.41, Mg 1.0, , trop 54>46>46, Plt 57, UA mod blood/bacteria/casts    CT chest: No acute left rib fracture is seen. Old right rib fractures.   CT cervical: spine No cervical spine fracture identified.   CT head: No acute intracranial abnormality.     Incidental Findings:   CT chest wo contrast: No acute left rib fracture is seen. Old right rib fractures.    Test Results Pending at Discharge (will require follow up):   Vitamin B12, Vitamin D, Folate      Outpatient Tests Requested:  Magnesium in 1 week     Complications:  none    Reason for Admission: Ambulatory Dysfunction, Alcohol Abuse    History of Present Illness:     Gregg Partida is a 64 y.o. male PMHx of alcohol withdrawal syndrome, COPD, chronic HF with preserved EF, hypothyroidism, afib, HTN, depression, and GERD who presents with inability to walk today. He was unstable on his feet today. The last time he drank alcohol (quart vodka)was Wednesday or Thursday, but he is having trouble remembering exact days and events. He is unsure which medications he took yesterday because he is having trouble paying. He has been falling a lot but cannot recall the exact days and times he falls. He is unsure whether he hit  his head or if he lost consciousness.  Today his shortness of breath is worse than usual. He smokes 1 pack of cigarettes a day currently. He has chest pain in the left bottom of his chest sometimes, but he cannot recall when. Today he denies chest pain. He had diarrhea on Wednesday without vomiting. Dark urine last few days.     Hospital Course:   Gregg Partida is a 64 y.o. male patient who originally presented to the hospital on 9/14/2024 due to ambulatory dysfunction. Patient had CT chest, CT cervical spine, CT head which showed no abnormalities. Fall,CIWA, and Seizure precautions were added and PT/OT was placed. In regards to electrolyte abnormality patient on admission had Na 127 which was corrected, trended and repleted as needed. Telemetry was placed due to this for 24 hours then discontinued. On admission his anion gap was 24, trended with BMP daily. Patient is currently afebrile, vitally stable and AAOx3. Patient has no complaints of N/V/D/CP/SOB. PT/OT recommended rolling walker upon discharge. Patient is stable for discharge.     * Ambulatory dysfunction  Assessment & Plan  Presents with complaint of lower extremity weakness and multiple recent falls in setting of chronic alcohol abuse with severe deconditioning and electrolyte abnormalities. Large hematoma on back and bleeding wounds on R arm and leg noted on initial exam, reports last fall was 3-4 days ago when he last drank approx 1 pint of Vodka. Denies head trauma or LOC. Reports mostly staying in bed since last discharge.     CT chest No acute left rib fracture is seen. Old right rib fractures.   CT cervical spine No cervical spine fracture identified.   CT head No acute intracranial abnormality.     PT/OT recommendations  Home therapy services and use of roller walker at all times for balance and fall prevention.   Patient declined home services   Fall, CIWA, & Seizure precautions  CM consult    Electrolyte abnormality  Assessment & Plan  Chronic,  POA Na 127 & Mg 1.0 likely 2/2 chronic alcohol abuse and poor PO intake. Denies dizziness/lightheadedness, NV.  - ED: 1L NS bolus     Mg 1.7, Na 130, K 4.4 on day of discharge     Continue home mag ox 400mg BID  Trend daily, replete as needed  Discontinued Telemetry. No significant events noted over 24hr monitoring on admission.   Continue IVF    Chronic alcohol use  Assessment & Plan  Chronic. Not in acute withdrawal right now.     Continue daily: multivitamin, folic acid 1mg, thiamine 100mg  Monitor with CIWA score     BPH (benign prostatic hyperplasia)  Assessment & Plan  Chronic,stable. Home meds include tamsulosin 0.4mg daily    Continue home tamsulosin     Iron deficiency  Assessment & Plan  Chronic, stable. Home meds: Ferrous sulfate 325mg daily.    Continue home ferrous sulfate     Financial insecurity  Assessment & Plan  Reports noncompliance with medication due to inability to pay for them. Reports attempting to reach his son for assistance after recent discharge with no success.  Referral to CM  Discussed the possibility of STR for PT and med management before transitioning back home, patient resistant    Hypertension  Assessment & Plan  Chronic, BP soft on admission. Home medication metoprolol 50 mg twice daily.     Continue home metoprolol     Acute kidney injury superimposed on chronic kidney disease  (HCC)  Assessment & Plan  Lab Results   Component Value Date    EGFR 53 09/14/2024    EGFR 60 08/17/2024    EGFR 67 08/16/2024    CREATININE 1.41 (H) 09/14/2024    CREATININE 1.26 08/17/2024    CREATININE 1.16 08/16/2024       GERD (gastroesophageal reflux disease)  Assessment & Plan  Chronic, stable. Home med protonix 40mg bid.    Continue home Protonix     Stage 3a chronic kidney disease (HCC)  Assessment & Plan  Lab Results   Component Value Date    EGFR 69 09/16/2024    EGFR 52 09/15/2024    EGFR 53 09/14/2024    CREATININE 1.13 09/16/2024    CREATININE 1.42 (H) 09/15/2024    CREATININE 1.41 (H)  09/14/2024   Chronic. Baseline Cr 1.0-1.3   Elevated Cr on presentation in setting of poor nutrition/hydration and alcohol abuse. Not ENMA per KDIGO.    Cr now improved to within baseline    Metabolic acidosis, increased anion gap  Assessment & Plan  POA AG 24 in setting of alcohol use.     Monitor BMP   ml/hr     A-fib ([facility])  Assessment & Plan  Chronic stable.  - EKG: NSR with PAC    Continue home metoprolol and eliquis    Chronic heart failure with preserved ejection fraction ([facility])  Assessment & Plan  Wt Readings from Last 3 Encounters:   09/16/24 83.3 kg (183 lb 10.3 oz)   08/16/24 85.7 kg (189 lb)   06/04/24 91.2 kg (201 lb)   Chronic, hypovolemic on presentation. Denies chest pain or SOB.    Continue home Metoprolol & Ranexa    Thrombocytopenia ([facility])  Assessment & Plan  Chronic in setting of alcoholic cirrhosis.   - POA Plt 57. Remains stable at 60     Trend daily  Holding home ASA, resume on discharge   Monitor for signs of bleeding  Continue home Eliquis for A-fib. Benefit vs risk in setting of thrombocytopenia     Dyslipidemia  Assessment & Plan  Chronic, stable. Home med: Pravastatin 40mg  Last lipid panel 11 months ago: Cholesterol 190, TG 71,      Continue home pravastatin     Hx of CABG  Assessment & Plan  Chronic, stable. Troponin delta -8 on admission.  LAZARA score 4. Heart score 2.      Continue home statin  ASA held due to thrombocytopenia    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 disease ([facility])  Assessment & Plan  Lab Results   Component Value Date    HGBA1C 5.4 08/15/2024     Recent Labs     09/15/24  1142 09/15/24  1649 09/15/24  2052 09/16/24  0726 09/16/24  1131   POCGLU 157* 180* 160* 127 227*   Blood glucose average over last 72 hours: (P) 151.75    Chronic, stable. Home med includes metformin 500mg daily and gabapentin 300mg tid    Resume home metformin on discharge  Continue gabapentin    COPD (chronic obstructive pulmonary disease) ([facility])  Assessment & Plan  Chronic,  "smokes 1ppd. No PFT documented.     Continue home Breo inhaler  Continue albuterol inhaler PRN   Recommend OP PFT  Nicotine patch          Condition at Discharge: good    Discharge Day Visit / Exam:   Subjective: Patient was seen and examined at bedside.  He states he is feeling well today and denies any concerns including chest pain, palpitations, shortness of breath, abdominal pain, nausea, vomiting.  Patient would like to go home today.  CIWA score of 2 per my exam.    Objective:   Vitals: Blood Pressure: 149/77 (09/16/24 0440)  Pulse: 76 (09/16/24 0440)  Temperature: (!) 97.4 °F (36.3 °C) (09/15/24 2230)  Temp Source: Oral (09/14/24 1603)  Respirations: 20 (09/15/24 2230)  Height: 6' 2\" (188 cm) (09/14/24 1603)  Weight - Scale: 83.3 kg (183 lb 10.3 oz) (09/14/24 1120)  SpO2: 96 % (09/16/24 0440)  Exam:   Physical Exam  Constitutional:       General: He is not in acute distress.     Appearance: Normal appearance. He is normal weight. He is not toxic-appearing or diaphoretic.   HENT:      Head: Normocephalic and atraumatic.      Nose: No congestion or rhinorrhea.      Mouth/Throat:      Mouth: Mucous membranes are moist.   Eyes:      Extraocular Movements: Extraocular movements intact.   Cardiovascular:      Rate and Rhythm: Normal rate and regular rhythm.      Heart sounds: Normal heart sounds. No murmur heard.  Pulmonary:      Effort: Pulmonary effort is normal. No respiratory distress.   Abdominal:      General: Bowel sounds are normal.      Palpations: Abdomen is soft. There is no mass.      Tenderness: There is no abdominal tenderness.   Musculoskeletal:      Right lower leg: No edema.      Left lower leg: No edema.   Skin:     Coloration: Skin is not jaundiced.      Findings: Bruising (hematoma on back) and wound (with dried blood on RUE and RLE; no signs of infection) present.   Neurological:      General: No focal deficit present.      Mental Status: He is alert and oriented to person, place, and time.     "  GCS: GCS eye subscore is 4. GCS verbal subscore is 5. GCS motor subscore is 6.      Motor: Tremor present.   Psychiatric:         Mood and Affect: Mood normal.         Behavior: Behavior normal.        Discussion with Family: Patient declined call to .     Discharge instructions/Information to patient and family:   See after visit summary for information provided to patient and family.      Provisions for Follow-Up Care:  See after visit summary for information related to follow-up care and any pertinent home health orders.      Mobility at time of Discharge:   Basic Mobility Inpatient Raw Score: 24  JH-HLM Goal: 8: Walk 250 feet or more  JH-HLM Achieved: 6: Walk 10 steps or more  HLM Goal NOT achieved. Continue to encourage mobility in post discharge setting.     Disposition:   Home    Planned Readmission: no    Discharge Medications:  See after visit summary for reconciled discharge medications provided to patient and/or family.      Administrative Statements   Discharge Statement:  I have spent a total time of 40 minutes in caring for this patient on the day of the visit/encounter.     **Please Note: This note may have been constructed using a voice recognition system**

## 2024-09-16 NOTE — ASSESSMENT & PLAN NOTE
Wt Readings from Last 3 Encounters:   09/16/24 83.3 kg (183 lb 10.3 oz)   08/16/24 85.7 kg (189 lb)   06/04/24 91.2 kg (201 lb)   Chronic, hypovolemic on presentation. Denies chest pain or SOB.    Continue home Metoprolol & Ranexa

## 2024-09-16 NOTE — ASSESSMENT & PLAN NOTE
Presents with complaint of lower extremity weakness and multiple recent falls in setting of chronic alcohol abuse with severe deconditioning and electrolyte abnormalities. Large hematoma on back and bleeding wounds on R arm and leg noted on initial exam, reports last fall was 3-4 days ago when he last drank approx 1 pint of Vodka. Denies head trauma or LOC. Reports mostly staying in bed since last discharge.     CT chest No acute left rib fracture is seen. Old right rib fractures.   CT cervical spine No cervical spine fracture identified.   CT head No acute intracranial abnormality.     PT/OT recommendations  Home therapy services and use of roller walker at all times for balance and fall prevention.   Patient declined home services   Fall, CIWA, & Seizure precautions  CM consult

## 2024-09-16 NOTE — ASSESSMENT & PLAN NOTE
Chronic, POA Na 127 & Mg 1.0 likely 2/2 chronic alcohol abuse and poor PO intake. Denies dizziness/lightheadedness, NV.  - ED: 1L NS bolus     Mg 1.7, Na 130, K 4.4 on day of discharge     Continue home mag ox 400mg BID  Trend daily, replete as needed  Discontinued Telemetry. No significant events noted over 24hr monitoring on admission.   Continue IVF

## 2024-09-16 NOTE — OCCUPATIONAL THERAPY NOTE
"OT EVALUATION       09/16/24 1200   OT Last Visit   OT Visit Date 09/16/24   Note Type   Note type Evaluation   Pain Assessment   Pain Assessment Tool 0-10   Pain Score No Pain   Restrictions/Precautions   Other Precautions Chair Alarm;Bed Alarm;Fall Risk   Home Living   Type of Home Apartment   Home Layout One level;Elevator   Bathroom Shower/Tub Tub/shower unit   Bathroom Toilet Standard   Bathroom Equipment Grab bars in shower   Home Equipment Cane;Walker  (rollator and RW)   Additional Comments recently using walker due to gait dysfunction, typically does not use an assistive device for mobility   Prior Function   Level of [place] Independent with ADLs;Independent with functional mobility   Lives With Alone   Receives Help From Friend(s);Neighbor   IADLs Independent with meal prep;Independent with medication management  (uses bus or taxi for transportation)   Falls in the last 6 months 5 to 10   Subjective   Subjective \"Tell them to send me home in a few hours\"   ADL   Eating Assistance 7  Independent   Grooming Assistance 5  Supervision/Setup   UB Bathing Assistance 5  Supervision/Setup   LB Bathing Assistance 4  Minimal Assistance   UB Dressing Assistance 5  Supervision/Setup   LB Dressing Assistance 4  Minimal Assistance   Toileting Assistance  4  Minimal Assistance   Bed Mobility   Supine to Sit 5  Supervision   Sit to Supine 5  Supervision   Transfers   Sit to Stand 4  Minimal assistance   Stand to Sit 4  Minimal assistance   Toilet transfer 4  Minimal assistance   Additional items Standard toilet   Functional Mobility   Functional Mobility 4  Minimal assistance   Additional Comments household distance with RW   Balance   Static Sitting Fair +   Dynamic Sitting Fair   Static Standing Fair   Dynamic Standing Fair -   Activity Tolerance   Activity Tolerance Patient limited by fatigue  (impulsive)   RUE Assessment   RUE Assessment   (shoulder flexion AROM 60 degrees, elbow/ AROM WFL)   LUE Assessment "   LUE Assessment   (shoulder flexion AROM 60 degrees, elbow/ AROM WFL)   Cognition   Overall Cognitive Status WFL   Arousal/Participation Cooperative   Attention Attends with cues to redirect   Orientation Level Oriented X4   Following Commands Follows multistep commands with increased time or repetition   Comments pt is impulsive   Assessment   Limitation Decreased ADL status;Decreased UE strength;Decreased Safe judgement during ADL;Decreased endurance;Decreased self-care trans;Decreased high-level ADLs  (decreased balance and mobility)   Prognosis Good   Assessment Patient evaluated by Occupational Therapy.  Patient admitted with Ambulatory dysfunction.  The patients occupational profile, medical and therapy history includes a extensive additional review of physical, cognitive, or psychosocial history related to current functional performance.  Comorbidities affecting functional mobility and ADLS include: Afib, CABG, CAD, cancer, cardiac disease, COPD, and hypertension. And ETOH abuse  Prior to admission, patient was independent with functional mobility with RW recently but not normally, independent with ADLS, and requiring assist for IADLS.  The evaluation identifies the following performance deficits: weakness, impaired balance, decreased endurance, increased fall risk, new onset of impairment of functional mobility, decreased ADLS, decreased IADLS, decreased activity tolerance, decreased safety awareness, impaired judgement, and decreased strength, that result in activity limitations and/or participation restrictions. This evaluation requires clinical decision making of high complexity, because the patient presents with comorbidites that affect occupational performance and required significant modification of tasks or assistance with consideration of multiple treatment options.  The Barthel Index was used as a functional outcome tool presenting with a score of Barthel Index Score: 55, indicating marked  limitations of functional mobility and ADLS.  The patient's raw score on the -PAC Daily Activity Inpatient Short Form is 21. A raw score of greater than or equal to 19 suggests the patient may benefit from discharge to home. Please refer to the recommendation of the Occupational Therapist for safe discharge planning.  Patient will benefit from skilled Occupational Therapy services to address above deficits and facilitate a safe return to prior level of function.   Goals   Patient Goals to go home   STG Time Frame   (1-7 days)   Short Term Goal  Goals established to promote Patient Goals: to go home:  Grooming: supervision standing at sink; Bathing: supervision; Upper Body Dressing independent; Lower Body Dressing: supervision; Toileting: supervision; Patient will increase ambulatory standard toilet transfer to supervision with rolling walker to increase performance and safety with ADLS and functional mobility; Patient will increase standing tolerance to 5 minutes during ADL task to decrease assistance level and decrease fall risk; Patient will increase bed mobility to independent in preparation for ADLS and transfers; Patient will increase functional mobility to and from bathroom with rolling walker with supervision to increase performance with ADLS and to use a toilet; Patient will tolerate 5 minutes of UE ROM/strengthening to increase general activity tolerance and performance in ADLS/IADLS; Patient will improve functional activity tolerance to 10 minutes of sustained functional tasks to increase participation in basic self-care and decrease assistance level; Patient will increase dynamic standing balance to fair to improve postural stability and decrease fall risk during standing ADLS and transfers.   LTG Time Frame   (8-14 days)   Long Term Goal Grooming: independent standing at sink; Bathing: independent; Lower Body Dressing: independent; Toileting: independent; Patient will increase ambulatory standard  toilet transfer to independent with rolling walker to increase performance and safety with ADLS and functional mobility; Patient will increase standing tolerance to 10 minutes during ADL task to decrease assistance level and decrease fall risk;  Patient will increase functional mobility to and from bathroom with rolling walker independently to increase performance with ADLS and to use a toilet; Patient will tolerate 10 minutes of UE ROM/strengthening to increase general activity tolerance and performance in ADLS/IADLS; Patient will improve functional activity tolerance to 20 minutes of sustained functional tasks to increase participation in basic self-care and decrease assistance level; Patient will increase dynamic standing balance to fair+ to improve postural stability and decrease fall risk during standing ADLS and transfers.   Pt will score >/= 24/24 on AM-PAC Daily Activity Inpatient scale to promote safe independence with ADLs and functional mobility; Pt will score >/= 85/100 on Barthel Index in order to decrease caregiver assistance needed and increase ability to perform ADLs and functional mobility.   Plan   Treatment Interventions ADL retraining;Functional transfer training;UE strengthening/ROM;Endurance training;Patient/family training;Equipment evaluation/education;Activityengagement;Compensatory technique education   Goal Expiration Date 09/30/24   OT Frequency 3-5x/wk   Discharge Recommendation   Rehab Resource Intensity Level, OT III (Minimum Resource Intensity)   AM-PAC Daily Activity Inpatient   Lower Body Dressing 3   Bathing 3   Toileting 3   Upper Body Dressing 4   Grooming 4   Eating 4   Daily Activity Raw Score 21   Daily Activity Standardized Score (Calc for Raw Score >=11) 44.27   AM-PAC Applied Cognition Inpatient   Following a Speech/Presentation 4   Understanding Ordinary Conversation 4   Taking Medications 4   Remembering Where Things Are Placed or Put Away 4   Remembering List of 4-5  Errands 4   Taking Care of Complicated Tasks 4   Applied Cognition Raw Score 24   Applied Cognition Standardized Score 62.21   Barthel Index   Feeding 10   Bathing 0   Grooming Score 5   Dressing Score 5   Bladder Score 10   Bowels Score 10   Toilet Use Score 5   Transfers (Bed/Chair) Score 10   Mobility (Level Surface) Score 0   Stairs Score 0   Barthel Index Score 55   Licensure   [place] License Number  Ursula Reyes MS OTR/L [id number]

## 2024-09-16 NOTE — QUICK NOTE
Patient prescribed inhalers for discharge.  Received at bedside prior to discharge.   Inhaler teaching/counseling was provided by inpatient resident team provider.  Patient expressed understanding of teaching.  Discharged home with inhalers.      Lois Kitchen DO  Family Medicine, PGY-3  09/16/24  3:58 PM

## 2024-09-16 NOTE — ASSESSMENT & PLAN NOTE
Lab Results   Component Value Date    HGBA1C 5.4 08/15/2024     Recent Labs     09/15/24  1142 09/15/24  1649 09/15/24  2052 09/16/24  0726 09/16/24  1131   POCGLU 157* 180* 160* 127 227*   Blood glucose average over last 72 hours: (P) 151.75    Chronic, stable. Home med includes metformin 500mg daily and gabapentin 300mg tid    Resume home metformin on discharge  Continue gabapentin

## 2024-09-16 NOTE — INCIDENTAL FINDINGS
The following findings require follow up:  Radiographic finding   Finding: CT chest wo contrast: No acute left rib fracture is seen. Old right rib fractures.    Follow up should be done within 1 week(s)    Please notify the following clinician to assist with the follow up:   PCP    Incidental finding results were discussed with the Patient by Lois Kitchen DO on 09/16/24.   They expressed understanding and all questions answered.

## 2024-09-16 NOTE — ASSESSMENT & PLAN NOTE
Chronic in setting of alcoholic cirrhosis.   - POA Plt 57. Remains stable at 60     Trend daily  Holding home ASA, resume on discharge   Monitor for signs of bleeding  Continue home Eliquis for A-fib. Benefit vs risk in setting of thrombocytopenia

## 2024-09-16 NOTE — ASSESSMENT & PLAN NOTE
Lab Results   Component Value Date    HGBA1C 5.4 08/15/2024     Recent Labs     09/15/24  0709 09/15/24  1142 09/15/24  1649 09/15/24  2052 09/16/24  0726   POCGLU 129 157* 180* 160* 127   Blood glucose average over last 72 hours: (P) 141    Chronic, stable. Home med includes metformin 500mg daily and gabapentin 300mg tid    Hold home metformin, continue gabapentin  ISS & Accuchecks ACHS  Hypoglycemic protocol

## 2024-09-16 NOTE — PLAN OF CARE
Problem: PAIN - ADULT  Goal: Verbalizes/displays adequate comfort level or baseline comfort level  Description: Interventions:  - Encourage patient to monitor pain and request assistance  - Assess pain using appropriate pain scale  - Administer analgesics based on type and severity of pain and evaluate response  - Implement non-pharmacological measures as appropriate and evaluate response  - Consider cultural and social influences on pain and pain management  - Notify physician/advanced practitioner if interventions unsuccessful or patient reports new pain  Outcome: Progressing     Problem: INFECTION - ADULT  Goal: Absence or prevention of progression during hospitalization  Description: INTERVENTIONS:  - Assess and monitor for signs and symptoms of infection  - Monitor lab/diagnostic results  - Monitor all insertion sites, i.e. indwelling lines, tubes, and drains  - Monitor endotracheal if appropriate and nasal secretions for changes in amount and color  - [place] appropriate cooling/warming therapies per order  - Administer medications as ordered  - Instruct and encourage patient and family to use good hand hygiene technique  - Identify and instruct in appropriate isolation precautions for identified infection/condition  Outcome: Progressing  Goal: Absence of fever/infection during neutropenic period  Description: INTERVENTIONS:  - Monitor WBC    Outcome: Progressing     Problem: SAFETY ADULT  Goal: Patient will remain free of falls  Description: INTERVENTIONS:  - Educate patient/family on patient safety including physical limitations  - Instruct patient to call for assistance with activity   - Consult OT/PT to assist with strengthening/mobility   - Keep Call bell within reach  - Keep bed low and locked with side rails adjusted as appropriate  - Keep care items and personal belongings within reach  - Initiate and maintain comfort rounds  - Make Fall Risk Sign visible to staff  - Offer Toileting every 2 Hours,  in advance of need  - Initiate/Maintain bed alarm  - Obtain necessary fall risk management equipment: call bell   - Apply yellow socks and bracelet for high fall risk patients  - Consider moving patient to room near nurses station  Outcome: Progressing  Goal: Maintain or return to baseline ADL function  Description: INTERVENTIONS:  -  Assess patient's ability to carry out ADLs; assess patient's baseline for ADL function and identify physical deficits which impact ability to perform ADLs (bathing, care of mouth/teeth, toileting, grooming, dressing, etc.)  - Assess/evaluate cause of self-care deficits   - Assess range of motion  - Assess patient's mobility; develop plan if impaired  - Assess patient's need for assistive devices and provide as appropriate  - Encourage maximum independence but intervene and supervise when necessary  - Involve family in performance of ADLs  - Assess for home care needs following discharge   - Consider OT consult to assist with ADL evaluation and planning for discharge  - Provide patient education as appropriate  Outcome: Progressing  Goal: Maintains/Returns to pre admission functional level  Description: INTERVENTIONS:  - Perform AM-PAC 6 Click Basic Mobility/ Daily Activity assessment daily.  - Set and communicate daily mobility goal to care team and patient/family/caregiver.   - Collaborate with rehabilitation services on mobility goals if consulted  - Perform Range of Motion 3 times a day.  - Reposition patient every 2 hours.  - Dangle patient 3 times a day  - Stand patient 3 times a day  - Ambulate patient 3 times a day  - Out of bed to chair 3 times a day   - Out of bed for meals 3 times a day  - Out of bed for toileting  - Record patient progress and toleration of activity level   Outcome: Progressing     Problem: DISCHARGE PLANNING  Goal: Discharge to home or other facility with appropriate resources  Description: INTERVENTIONS:  - Identify barriers to discharge w/patient and  caregiver  - Arrange for needed discharge resources and transportation as appropriate  - Identify discharge learning needs (meds, wound care, etc.)  - Arrange for interpretive services to assist at discharge as needed  - Refer to Case Management Department for coordinating discharge planning if the patient needs post-hospital services based on physician/advanced practitioner order or complex needs related to functional status, cognitive ability, or social support system  Outcome: Progressing     Problem: Knowledge Deficit  Goal: Patient/family/caregiver demonstrates understanding of disease process, treatment plan, medications, and discharge instructions  Description: Complete learning assessment and assess knowledge base.  Interventions:  - Provide teaching at level of understanding  - Provide teaching via preferred learning methods  Outcome: Progressing

## 2024-09-16 NOTE — CASE MANAGEMENT
Case Management Assessment & Discharge Planning Note    Patient name Gregg Partida  Location [address] [address] [address] MRN [id number]  : 1960 Date 2024       Current Admission Date: 2024  Current Admission Diagnosis:Ambulatory dysfunction   Patient Active Problem List    Diagnosis Date Noted Date Diagnosed    Ambulatory dysfunction 2024     Financial insecurity 2024     Iron deficiency 2024     BPH (benign prostatic hyperplasia) 2024     Chronic alcohol use 2024     Fall, initial encounter 2024     Hypertension 2024     Hemorrhagic cystitis 2024     Alcohol abuse 2024     Acute kidney injury superimposed on chronic kidney disease  (HCC) 02/10/2024     Hypothyroidism 2024     Troponin level elevated 2024     Gastrointestinal hemorrhage with melena 10/29/2023     Abnormal thyroid stimulating hormone level 2023     Chest pain 2023     Paroxysmal atrial fibrillation (HCC) 2023     GERD (gastroesophageal reflux disease) 2023     Sleep apnea 2023     Stable angina 2022     Stage 3a chronic kidney disease (HCC) 2022     Fatty liver, alcoholic 04/15/2022     Nonischemic nontraumatic myocardial injury 04/10/2022     History of atrial fibrillation 10/25/2021     Mild protein-calorie malnutrition (HCC) 2021     Prostate cancer ([facility]) 2021     Metabolic acidosis, increased anion gap 2021     A-fib ([facility]) 2020     Chronic heart failure with preserved ejection fraction ([facility]) 2019     Noncompliance 2019     Alcohol abuse with withdrawal delirium ([facility]) 2019     Alcohol withdrawal syndrome with complication ([facility]) 10/17/2019     Electrolyte abnormality 10/17/2019     Cervical spinal stenosis 10/17/2019     Thrombocytopenia ([facility]) 2019     History of prostate cancer 2019     CAD (coronary artery disease) 2019     Nicotine abuse 2019      Hypomagnesemia 10/10/2018     Depression, recurrent (HCC) 10/10/2018     Hx of CABG 10/10/2018     Dyslipidemia 10/10/2018     COPD (chronic obstructive pulmonary disease) ([facility]) 10/09/2018     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 disease ([facility]) 10/09/2018     Hypertensive heart and chronic kidney disease with heart failure and stage 1 through stage 4 chronic kidney disease, or chronic kidney disease (HCC) 10/09/2018       LOS (days): 2  Geometric Mean LOS (GMLOS) (days): 3.1  Days to GMLOS:1.2     OBJECTIVE:    Risk of Unplanned Readmission Score: 81.3       Current admission status: Inpatient  Referral Reason: Other, Drug/Alcohol Abuse    Preferred Pharmacy:   [facility] [address] [address]  Phone: [phone] Fax: [phone]    [facility] [address] [place] [address] - [address]  [address]  [facility] [address]  Phone: [phone] Fax: [phone]    Primary Care Provider: Lilliana Bliss MD    Primary Insurance: [employer] REP  Secondary Insurance:     ASSESSMENT:  Active Health Care Proxies       Gregg Partida  Health Care Representative - Son   Primary Phone: [phone] (Mobile)                    Readmission Root Cause  30 Day Readmission: No    Patient Information  Admitted from:: Home  Mental Status: Alert  During Assessment patient was accompanied by: Not accompanied during assessment  Assessment information provided by:: Patient  Primary Caregiver: Self  Support Systems: Son  County of Residence: [place]  What city do you live in?: [place]  Home entry access options. Select all that apply.: Elevator  Type of Current Residence: Apartment  Floor Level: 5  Upon entering residence, is there a bedroom on the main floor (no further steps)?: Yes  Upon entering residence, is there a bathroom on the main floor (no further steps)?: Yes  Living Arrangements: Lives Alone    Activities of Daily Living  "Prior to Admission  Functional Status: Independent  Completes ADLs independently?: Yes  Ambulates independently?: Yes  Does patient use assisted devices?: Yes  Assisted Devices (DME) used: Walker  Does patient currently own DME?: Yes  What DME does the patient currently own?: Wheelchair  Does patient have a history of Outpatient Therapy (PT/OT)?: No  Does patient have a history of HHC?: No  Does patient currently have HHC?: No    Patient Information Continued  Income Source: SSI/SSD  Does patient have prescription coverage?: Yes  Does patient receive dialysis treatments?: No  Does patient have a history of substance abuse?: Yes  Historical substance use preference: Alcohol/ETOH  Is patient currently in treatment for substance abuse?: No. Patient declined treatment information. (Patient refused treatment resources, said he \"has it all straightened out\")    Means of Transportation  Means of Transport to [facility]:: Himanshu Amanda      Social Determinants of Health (SDOH)      Flowsheet Row Most Recent Value   Housing Stability    In the last 12 months, was there a time when you were not able to pay the mortgage or rent on time? N   In the past 12 months, how many times have you moved where you were living? 0   At any time in the past 12 months, were you homeless or living in a shelter (including now)? N   Transportation Needs    In the past 12 months, has lack of transportation kept you from medical appointments or from getting medications? no   In the past 12 months, has lack of transportation kept you from meetings, work, or from getting things needed for daily living? No   Food Insecurity    Within the past 12 months, you worried that your food would run out before you got the money to buy more. Never true   Within the past 12 months, the food you bought just didn't last and you didn't have money to get more. Never true   Utilities    In the past 12 months has the electric, gas, oil, or water company threatened to shut " off services in your home? No            DISCHARGE DETAILS:    Discharge planning discussed with:: Patient  Freedom of Choice: Yes    Comments - Freedom of Choice: SW spoke with patient at bedside to discuss discharge planning.  Patient's preference is to return home when medically cleared.  PT/OT recommendation is for HHC but patient refused referrals, stating that he has a walker that he will use until he feels better.  Patient also refused substance abuse treatment resources.      Per notes patient has had difficulty affording medications and [facility] provided an [place] application to patient.  He declined a referral to  Division of Aging and Disability Services for assistance with application.  Patient will need a Lyft home at discharge.      CM contacted family/caregiver?: No- see comments (Declined call to emergency contact)  Were Treatment Team discharge recommendations reviewed with patient/caregiver?: Yes  Did patient/caregiver verbalize understanding of patient care needs?: Yes  Were patient/caregiver advised of the risks associated with not following Treatment Team discharge recommendations?: Yes    Contacts  Patient Contacts: Gregg Partida Jr (son)  Relationship to Patient:: Family  Contact Method: Phone  Phone Number: [phone]    Requested Home Health Care         Is the patient interested in HHC at discharge?: No (refused referrals)    DME Referral Provided  Referral made for DME?: No    Other Referral/Resources/Interventions Provided:  Interventions: Transportation  Referral Comments: Patient will need Lyft home at discharge  Government Services::  ([place] application provided to patient, patient refused referral to  [employer] for assistance with application)    Would you like to participate in our [facility] service program?  : No - Declined    Treatment Team Recommendation: Home with Home Health Care  Discharge Destination Plan:: Home  Transport at Discharge : Ride Share          IMM Given (Date):: 09/14/24

## 2024-09-16 NOTE — CASE MANAGEMENT
Case Management Progress Note    Patient name Gregg Partida  Location [address] MRN [id number]  : 1960 Date 2024       LOS (days): 2  Geometric Mean LOS (GMLOS) (days): 3.1  Days to GMLOS:1        OBJECTIVE:      Current admission status: Inpatient  Preferred Pharmacy:   [facility] [address] [address]  Phone: [phone] Fax: [phone]    [facility] [address] [place] [address] [address]  [address]  [facility] [address]  Phone: [phone] Fax: [phone]    Primary Care Provider: Lilliana Bliss MD    Primary Insurance: [employer] REP  Secondary Insurance:     PROGRESS NOTE:    Lyft waiver signed by patient and placed in scan bin.  Nursing will call [facility] for Lyft when patient is ready to leave.

## 2024-09-16 NOTE — ASSESSMENT & PLAN NOTE
Lab Results   Component Value Date    EGFR 69 09/16/2024    EGFR 52 09/15/2024    EGFR 53 09/14/2024    CREATININE 1.13 09/16/2024    CREATININE 1.42 (H) 09/15/2024    CREATININE 1.41 (H) 09/14/2024   Chronic. Baseline Cr 1.0-1.3   Elevated Cr on presentation in setting of poor nutrition/hydration and alcohol abuse. Not ENMA per KDIGO.    Trend Daily  Cr now improved to within baseline  Continue IVF

## 2024-09-16 NOTE — ASSESSMENT & PLAN NOTE
Chronic, POA Na 127 & Mg 1.0 likely 2/2 chronic alcohol abuse and poor PO intake. Denies dizziness/lightheadedness, NV.  - ED: 1L NS bolus     Continue home mag ox 400mg BID  Trend daily, replete as needed  Discontinued Telemetry. No significant events noted over 24hr monitoring on admission.   Continue IVF

## 2024-09-16 NOTE — NURSING NOTE
Pt discharged home. Pt education complete. Pharmacy labeled inhaler.  MD distributed and completed education that goes along with inhaler. Verbal understanding given by pt about all. All belongings given to pt.

## 2024-09-16 NOTE — ASSESSMENT & PLAN NOTE
Lab Results   Component Value Date    EGFR 69 09/16/2024    EGFR 52 09/15/2024    EGFR 53 09/14/2024    CREATININE 1.13 09/16/2024    CREATININE 1.42 (H) 09/15/2024    CREATININE 1.41 (H) 09/14/2024   Chronic. Baseline Cr 1.0-1.3   Elevated Cr on presentation in setting of poor nutrition/hydration and alcohol abuse. Not ENMA per KDIGO.    Cr now improved to within baseline

## 2024-09-16 NOTE — ASSESSMENT & PLAN NOTE
Chronic in setting of alcoholic cirrhosis.   - POA Plt 57. Remains stable at 60     Trend daily  Holding home ASA  Monitor for signs of bleeding  Continue home Eliquis for A-fib. Benefit vs risk in setting of thrombocytopenia

## 2024-09-16 NOTE — UTILIZATION REVIEW
Initial Clinical Review    Admission: Date/Time/Statement:   Admission Orders (From admission, onward)       Ordered        09/14/24 1418  INPATIENT ADMISSION  Once                          Orders Placed This Encounter   Procedures    INPATIENT ADMISSION     Standing Status:   Standing     Number of Occurrences:   1     Order Specific Question:   Level of Care     Answer:   Med Surg [16]     Order Specific Question:   Estimated length of stay     Answer:   More than 2 Midnights     Order Specific Question:   Certification     Answer:   I certify that inpatient services are medically necessary for this patient for a duration of greater than two midnights. See H&P and MD Progress Notes for additional information about the patient's course of treatment.     ED Arrival Information       Expected   -    Arrival   9/14/2024 11:09    Acuity   Urgent              Means of arrival   Ambulance    Escorted by   [facility]    Admission type   Emergency              Arrival complaint   weakness             Chief Complaint   Patient presents with    Weakness - Generalized     Pt comes with hx of alcohol abuse. Per pt he has had no alcohol for the last four days. Pt has not eaten for four days. C/o diarrhea, weakness, SOB, and reports urine is brown and intermittent burning upon urination with little output. Several bruises noted to back and shoulder. Pt reports several falls but doesn't know when. C/o left sided chest pain that began when the pt quit drinking alcohol.        Initial Presentation:   64 yom to ER from home via EMS for generalized weakness. Reports leg weakness b/l which happens to him when his mag is low. Hx ETOH use daily, last drink 3-4 days ago, drinks vodka. No prior etoh withdrawal seizure. Reports falling a few days ago while drinking, unclear mechanism or if LOC or head strike. Pt reports left sided chest pain since falling. Presents with scattered bruising on arms and back.  Small hematoma to posterior R shoulder. Bruising to left chest wall. Mild tremors, GCS15, no deficits. Admission CT head neg. CT chest No acute left rib fracture is seen. Old right rib fractures.  CT cervical spine No cervical spine fracture identified. Labs: PLT 57, Na 127, Cl 84, CO2 19, anion gap 24, BUN 29, Cr 1.41, Mg 1.0, ast 66, initial troponin elevated, u/a:cloudy light orange, +ketones, bllod, prot, bili, urobilinogen, bacteria.   Admitted to inpatient status for ambulatory dysfunction.   .   Anticipated Length of Stay/Certification Statement:   Patient will be admitted on an Inpatient basis with an anticipated length of stay of  more than 2 midnights.     Justification for Hospital Stay: alcohol withdrawal    Date: 9/15/24   Day 2:   Ambulatory dysfunction 2nd alcohol withdrawal. Headache, intermittent chest pain& palpitations this morning. Remains on telemetry monitoring. Hematoma on back. Wounds with dried blood noted on RUE and RLE. In healing process, no signs of infection. +tremors, CIWA protocol in place.         ED Triage Vitals   Temperature Pulse Respirations Blood Pressure SpO2 Pain Score   09/14/24 1120 09/14/24 1120 09/14/24 1120 09/14/24 1120 09/14/24 1117 09/14/24 1523   99 °F (37.2 °C) 83 20 129/76 97 % No Pain     Weight (last 2 days)       Date/Time Weight    09/16/24 1236 83.3 (183.64)    09/14/24 1120 83.3 (183.64)            Vital Signs (last 3 days)       Date/Time Temp Pulse Resp BP MAP (mmHg) SpO2 O2 Flow Rate (L/min) O2 Device Patient Position - Orthostatic VS Joseph Coma Scale Score CIWA-Ar Total Pain    09/16/24 1236 97.6 °F (36.4 °C) 76 20 149/77 -- -- -- -- -- -- -- --    09/16/24 1200 -- -- -- -- -- -- -- -- -- -- 2 --    09/16/24 0900 -- -- -- -- -- -- -- -- -- 15 -- No Pain    09/16/24 0800 -- -- -- -- -- -- -- -- -- -- 2 --    09/16/24 04:40:38 -- 76 -- 149/77 101 96 % -- -- -- -- 2 --    09/16/24 0000 -- 71 -- 107/69 -- -- -- -- -- -- 2 --    09/15/24 22:30:27 97.4 °F  (36.3 °C) 87 20 132/86 101 97 % -- -- -- -- -- --    09/15/24 2100 -- -- -- -- -- 92 % -- None (Room air) -- -- -- No Pain    09/15/24 20:28:55 -- 81 -- 131/86 101 93 % -- -- -- -- 2 --    09/15/24 19:22:19 97.9 °F (36.6 °C) 75 16 129/67 88 94 % -- -- -- -- -- --    09/15/24 1600 -- -- -- -- -- -- -- -- -- -- 2 --    09/15/24 15:16:57 97.8 °F (36.6 °C) 70 18 110/58 75 95 % -- -- -- -- -- --    09/15/24 1200 -- -- -- -- -- -- -- -- -- -- 2 --    09/15/24 0800 -- -- -- -- -- -- -- -- -- -- 3 --    09/15/24 07:50:43 97.8 °F (36.6 °C) 76 16 135/72 93 94 % -- -- -- -- -- --    09/15/24 0745 -- -- -- -- -- -- -- -- -- -- -- No Pain    09/15/24 0520 -- 58 -- 135/69 -- -- -- -- -- -- 4 --    09/15/24 01:19:12 -- 75 -- 152/75 101 96 % -- -- -- -- 5 --    09/14/24 22:18:21 98.1 °F (36.7 °C) 76 18 157/74 102 95 % -- -- -- -- -- --    09/14/24 21:20:57 -- 74 -- 95/61 72 96 % -- -- -- -- 5 --    09/14/24 2100 -- -- -- -- -- 96 % -- None (Room air) -- -- -- No Pain    09/14/24 20:07:35 -- 91 -- 133/81 98 98 % -- -- -- -- -- --    09/14/24 19:37:09 98.1 °F (36.7 °C) 73 18 106/47 67 94 % -- -- -- -- 10 --    09/14/24 18:33:18 98.1 °F (36.7 °C) 88 17 120/56 77 92 % -- -- -- -- 8 --    09/14/24 1800 -- 84 -- -- -- -- -- -- -- -- -- --    09/14/24 17:26:59 98.4 °F (36.9 °C) 91 17 123/54 77 94 % -- -- -- -- 9 --    09/14/24 17:25:43 -- 91 -- -- -- 93 % -- -- -- -- -- --    09/14/24 1644 -- 80 -- -- -- -- -- -- -- -- 12 --    09/14/24 1603 -- 88 18 103/49 -- -- -- -- -- -- -- --    09/14/24 1537 -- -- -- -- -- 97 % 0 L/min None (Room air) -- -- -- --    09/14/24 15:35:31 98.1 °F (36.7 °C) 88 -- -- -- 97 % -- -- -- -- -- --    09/14/24 15:23:38 -- 85 18 103/49 67 93 % -- -- -- -- 12 No Pain    09/14/24 1505 -- 89 18 120/64 -- 94 % -- None (Room air) Lying -- -- --    09/14/24 1445 -- 89 -- -- -- 97 % -- -- -- -- -- --    09/14/24 1434 -- -- -- -- -- -- -- None (Room air) -- -- -- --    09/14/24 1430 -- 88 -- -- -- 95 % -- -- -- -- -- --     09/14/24 1415 -- 82 -- -- -- 91 % -- -- -- -- -- --    09/14/24 1400 -- 85 -- -- -- 93 % -- -- -- -- -- --    09/14/24 1330 -- 102 -- -- -- 96 % -- -- -- -- -- --    09/14/24 1315 -- 94 -- -- -- 96 % -- -- -- -- -- --    09/14/24 1300 -- 92 -- -- -- 97 % -- -- -- -- -- --    09/14/24 1250 -- -- -- -- -- -- -- None (Room air) -- -- -- --    09/14/24 1245 -- 92 -- -- -- 96 % -- -- -- -- -- --    09/14/24 1230 -- 82 -- -- -- 95 % -- -- -- -- -- --    09/14/24 1215 -- 92 -- -- -- 96 % -- -- -- -- -- --    09/14/24 1155 -- -- -- -- -- -- -- -- -- -- 5 --    09/14/24 1145 -- -- -- -- -- -- -- None (Room air) -- -- -- --    09/14/24 1120 99 °F (37.2 °C) 83 20 129/76 -- 97 % -- None (Room air) Lying -- -- --    09/14/24 1117 -- -- -- -- -- 97 % -- -- -- -- -- --           CIWA-Ar Score       Row Name 09/16/24 1200 09/16/24 0800 09/16/24 04:40:38       CIWA-Ar    Nausea and Vomiting 0 0 0    Tactile Disturbances 0 0 0    Tremor 2 2 2    Auditory Disturbances 0 0 0    Paroxysmal Sweats 0 0 0    Visual Disturbances 0 0 0    Anxiety 0 0 0    Headache, Fullness in Head 0 0 0    Agitation 0 0 0    Orientation and Clouding of Sensorium 0 0 0    CIWA-Ar Total 2 2 2      Row Name 09/16/24 0000 09/15/24 20:28:55 09/15/24 1600       [facility]    /69 -- --    Pulse 71 -- --    Nausea and Vomiting 0 0 0    Tactile Disturbances 0 0 0    Tremor 2 2 2    Auditory Disturbances 0 0 0    Paroxysmal Sweats 0 0 0    Visual Disturbances 0 0 0    Anxiety 0 0 0    Headache, Fullness in Head 0 0 0    Agitation 0 0 0    Orientation and Clouding of Sensorium 0 0 0    [facility] Total 2 2 2      Row Name 09/15/24 1200 09/15/24 0800 09/15/24 0520       [facility]    BP -- -- 135/69    Pulse -- -- 58    Nausea and Vomiting 0 0 0    Tactile Disturbances 0 0 0    Tremor 2 3 3    Auditory Disturbances 0 0 0    Paroxysmal Sweats 0 0 0    Visual Disturbances 0 0 0    Anxiety 0 0 1    Headache, Fullness in Head 0 0 0    Agitation 0 0 0    Orientation and  Clouding of Sensorium 0 0 0    CIWA-Ar Total 2 3 4      Row Name 09/15/24 01:19:12 09/14/24 21:20:57 09/14/24 19:37:09       CIWA-Ar    Nausea and Vomiting 0 0 0    Tactile Disturbances 0 0 0    Tremor 3 3 3    Auditory Disturbances 0 0 0    Paroxysmal Sweats 0 0 0    Visual Disturbances 0 0 0    Anxiety 2 2 3    Headache, Fullness in Head 0 0 2    Agitation 0 0 2    Orientation and Clouding of Sensorium 0 0 0    CIWA-Ar Total 5 5 10      Row Name 09/14/24 18:33:18 09/14/24 1800 09/14/24 17:26:59       CIWA-Ar    Pulse -- 84 --    Nausea and Vomiting 0 -- 0    Tactile Disturbances 0 -- 0    Tremor 3 -- 3    Auditory Disturbances 0 -- 0    Paroxysmal Sweats 0 -- 0    Visual Disturbances 0 -- 0    Anxiety 3 -- 3    Headache, Fullness in Head 0 -- 0    Agitation 2 -- 3    Orientation and Clouding of Sensorium 0 -- 0    CIWA-Ar Total 8 -- 9      Row Name 09/14/24 1644 09/14/24 1155          CIWA-Ar    Pulse 80 --     Nausea and Vomiting -- 0     Tactile Disturbances -- 0     Tremor -- 3     Auditory Disturbances -- 0     Paroxysmal Sweats -- 0     Visual Disturbances -- 0     Anxiety -- 1     Headache, Fullness in Head -- 0     Agitation -- 1     Orientation and Clouding of Sensorium -- 0     CIWA-Ar Total -- 5                     Pertinent Labs/Diagnostic Test Results:   Radiology:  CT head without contrast   Final Interpretation by Clay Claros MD (09/14 6476)      No acute intracranial abnormality.                  Workstation performed: [id number]         CT spine cervical without contrast   Final Interpretation by Clay Claros MD (09/14 4348)      No cervical spine fracture identified.      Postsurgical changes from anterior and posterior cervical spinal fusion procedures with laminectomies as described above. Lucency surrounding the bilateral C6 lateral mass screws again mistreated indicative of loosening. The right vertical valorie appears    mildly dislodged from the right T2 transpedicular screw, grossly unchanged.       Stable right posterior paraspinal heterogeneous density chronic seroma/hematoma at the level of the posterior cervical spinal fusion.                        Workstation performed: [id number]         CT chest wo contrast   Final Interpretation by Jens Desai MD (09/14 1408)      No acute left rib fracture is seen. Old right rib fractures.      The study was marked in EPIC for immediate notification.         Workstation performed: [id number]           Cardiology:  ECG 12 lead   Final Result by Chris Phillips MD (09/16 1038)   Sinus rhythm with Premature atrial complexes   Possible Left atrial enlargement   Borderline ECG   When compared with ECG of 15-AUG-2024 00:14,   Premature atrial complexes are now Present   Vent. rate has decreased BY  55 BPM   T wave inversion no longer evident in Inferior leads   T wave inversion no longer evident in Lateral leads   Confirmed by Chris Phillips ([id number]) on 9/16/2024 10:38:25 AM        GI:  No orders to display           Results from last 7 days   Lab Units 09/16/24  0506 09/15/24  0338 09/14/24  1129   WBC Thousand/uL 5.73 5.35 7.12   HEMOGLOBIN g/dL 10.6* 11.7* 12.8   HEMATOCRIT % 31.3* 35.2* 38.0   PLATELETS Thousands/uL 60* 54* 57*   TOTAL NEUT ABS Thousands/µL 3.15 3.44 4.14         Results from last 7 days   Lab Units 09/16/24  0506 09/15/24  0338 09/14/24  1129   SODIUM mmol/L 130* 129* 127*   POTASSIUM mmol/L 4.4 4.4 3.8   CHLORIDE mmol/L 96 93* 84*   CO2 mmol/L 27 26 19*   ANION GAP mmol/L 7 10 24*   BUN mg/dL 25 36* 29*   CREATININE mg/dL 1.13 1.42* 1.41*   EGFR ml/min/1.73sq m 69 52 53   CALCIUM mg/dL 8.5 8.5 8.4   MAGNESIUM mg/dL 1.7* 1.5* 1.0*   PHOSPHORUS mg/dL  --   --  2.6     Results from last 7 days   Lab Units 09/14/24  1129   AST U/L 66*   ALT U/L 44   ALK PHOS U/L 99   TOTAL PROTEIN g/dL 7.4   ALBUMIN g/dL 4.1   TOTAL BILIRUBIN mg/dL 1.07*     Results from last 7 days   Lab Units 09/16/24  1131 09/16/24  0726 09/15/24  2052 09/15/24  164  09/15/24  1142 09/15/24  0709 09/14/24  2106 09/14/24  1658   POC GLUCOSE mg/dl 227* 127 160* 180* 157* 129 137 97     Results from last 7 days   Lab Units 09/16/24  0506 09/15/24  0338 09/14/24  1129   GLUCOSE RANDOM mg/dL 119 138 68             BETA-HYDROXYBUTYRATE   Date Value Ref Range Status   01/22/2024 3.2 (H) <0.6 mmol/L Final                  Results from last 7 days   Lab Units 09/14/24  1129   CK TOTAL U/L 258     Results from last 7 days   Lab Units 09/14/24  1546 09/14/24  1327 09/14/24  1129   HS TNI 0HR ng/L  --   --  54*   HS TNI 2HR ng/L  --  46  --    HSTNI D2 ng/L  --  -8  --    HS TNI 4HR ng/L 46  --   --    HSTNI D4 ng/L -8  --   --          Results from last 7 days   Lab Units 09/14/24  1129   PROTIME seconds 14.4   INR  1.07   PTT seconds 31     Results from last 7 days   Lab Units 09/16/24  0506   TSH 3RD GENERATON uIU/mL 0.620                                                         Results from last 7 days   Lab Units 09/14/24  1430   CLARITY UA  Slightly Cloudy   COLOR UA  Light Orange   SPEC GRAV UA  1.020   PH UA  5.5   GLUCOSE UA mg/dl Negative   KETONES UA mg/dl 40 (2+)*   BLOOD UA  Moderate*   PROTEIN UA mg/dl 30 (1+)*   NITRITE UA  Negative   BILIRUBIN UA  Small*   UROBILINOGEN UA (BE) mg/dl 4.0*   LEUKOCYTES UA  Trace*   WBC UA /hpf 0-5   RBC UA /hpf 0-1   BACTERIA UA /hpf Moderate*   EPITHELIAL CELLS WET PREP /hpf Moderate*   MUCUS THREADS  Moderate*                                                   ED Treatment-Medication Administration from 09/14/2024 1109 to 09/14/2024 1516         Date/Time Order Dose Route Action     09/14/2024 1138 diazepam (VALIUM) injection 10 mg 10 mg Intravenous Given     09/14/2024 1137 sodium chloride 0.9 % bolus 1,000 mL 1,000 mL Intravenous New Bag     09/14/2024 1137 ondansetron (ZOFRAN) injection 4 mg 4 mg Intravenous Given     09/14/2024 1215 magnesium sulfate 2 g/50 mL IVPB (premix) 2 g 2 g Intravenous New Bag     09/14/2024 1214 thiamine tablet  100 mg 100 mg Oral Given     09/14/2024 1214 folic acid (FOLVITE) tablet 1 mg 1 mg Oral Given     09/14/2024 1214 multivitamin-minerals (CENTRUM) tablet 1 tablet 1 tablet Oral Given            Past Medical History:   Diagnosis Date    Acute on chronic kidney failure  (HCC)     Alcohol withdrawal (HCC) 06/07/2019    Atrial fibrillation (HCC)     Cancer (HCC)     prostate ca,had radiation    Cardiac disease     stents,then triple bypass    COPD (chronic obstructive pulmonary disease) (HCC)     Coronary artery disease     ETOH abuse     Heart failure (HCC)     History of heart surgery     [facility] triple bypass [facility]    Hx of heart artery stent     2014    Hyperlipidemia     Hypertension     Hyponatremia 10/17/2019    Hypovolemic shock (HCC) 12/22/2019    Lumbar spondylitis ([facility]) 10/13/2022    Metabolic acidosis, increased anion gap 04/21/2021    Nasal bone fracture 10/10/2022    Prostate CA (HCC)     S/P CABG x 3     2004    Sleep apnea      Present on Admission:   A-fib (HCC)   Chronic heart failure with preserved ejection fraction (HCC)   Hypertension   Thrombocytopenia (HCC)   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 disease (HCC)   COPD (chronic obstructive pulmonary disease) ([facility])   Metabolic acidosis, increased anion gap   Electrolyte abnormality   Stage 3a chronic kidney disease (HCC)   Ambulatory dysfunction   Dyslipidemia   GERD (gastroesophageal reflux disease)      Admitting Diagnosis: Alcoholic ketoacidosis [E87.29]  Hypomagnesemia [E83.42]  Alcohol abuse [F10.10]  Hyponatremia [E87.1]  Weakness [R53.1]  Elevated troponin [R79.89]  Generalized weakness [R53.1]  Age/Sex: 64 y.o. male  Admission Orders:  CIWA protocol  Aspiration precautions  Seizure precautions  Cont pulse ox  Accuchecks  Scd  Telemetry  Pt/ot eval & tx    Scheduled Medications:  Medications 09/07 09/08 09/09 09/10 09/11 09/12 09/13 09/14 09/15 09/16   apixaban (ELIQUIS) tablet 5 mg  Dose: 5 mg  Freq: 2 times daily Route:  PO  Start: 09/14/24 1800   Admin Instructions:      Order specific questions:              1713      0854     1734      0900     1800        aspirin chewable tablet 81 mg  Dose: 81 mg  Freq: Daily Route: PO  Start: 09/14/24 1600 End: 09/14/24 1637           1600     1637-D/C'd        bacitracin topical ointment 1 small application  Dose: 1 small application  Freq: Once Route: TP  Start: 09/07/24 1545 End: 09/07/24 1548   Admin Instructions:      Order specific questions:       1548                 diazepam (VALIUM) injection 10 mg  Dose: 10 mg  Freq: Once Route: IV  Start: 09/14/24 1130 End: 09/14/24 1138   Admin Instructions:              1138          ferrous sulfate tablet 325 mg  Dose: 325 mg  Freq: Daily with breakfast Route: PO  Start: 09/15/24 0730   Admin Instructions:               0853      0730        fluticasone-vilanterol 200-25 mcg/actuation 1 puff  Dose: 1 puff  Freq: Daily Route: IN  Start: 09/14/24 1615   Admin Instructions:              1714      0858      0900        folic acid (FOLVITE) tablet 1 mg  Dose: 1 mg  Freq: Daily Route: PO  Start: 09/14/24 1600           1600      0854      0900        folic acid (FOLVITE) tablet 1 mg  Dose: 1 mg  Freq: Once Route: PO  Start: 09/14/24 1215 End: 09/14/24 1214           1214          gabapentin (NEURONTIN) capsule 300 mg  Dose: 300 mg  Freq: 3 times daily Route: PO  Start: 09/14/24 1600   Admin Instructions:              1600     2132      0854     1535     2119      0900     1600     2100         insulin lispro (HumALOG/ADMELOG) 100 units/mL subcutaneous injection 1-6 Units  Dose: 1-6 Units  Freq: 4 times daily (before meals and at bedtime) Route: SC  Start: 09/14/24 1645   Admin Instructions:              1713)     (6616) [C]      [phone] 6515     2119 (6784)     1131 8049     2200        LORazepam (ATIVAN) tablet 2 mg  Dose: 2 mg  Freq: Once Route: PO  Indications of Use: ALCOHOL WITHDRAWAL SYNDROME  Start: 09/14/24 2015 End:  09/14/24 2011   Admin Instructions:              2011          LORazepam (ATIVAN) tablet 2 mg  Dose: 2 mg  Freq: Once Route: PO  Indications of Use: ALCOHOL WITHDRAWAL SYNDROME  Start: 09/14/24 1845 End: 09/14/24 1844   Admin Instructions:              1844          LORazepam (ATIVAN) tablet 2 mg  Dose: 2 mg  Freq: Once Route: PO  Indications of Use: ALCOHOL WITHDRAWAL SYNDROME  Start: 09/14/24 1715 End: 09/14/24 1713   Admin Instructions:              1713          magnesium Oxide (MAG-OX) tablet 400 mg  Dose: 400 mg  Freq: 2 times daily Route: PO  Start: 09/14/24 1800           1713      0853     1734      0900     1800        magnesium sulfate 2 g/50 mL IVPB (premix) 2 g  Dose: 2 g  Freq: Once Route: IV  Last Dose: Stopped (09/14/24 1315)  Start: 09/14/24 1200 End: 09/14/24 1315   Admin Instructions:              1215     1315          magnesium sulfate 4 g/100 mL IVPB (premix) 4 g  Dose: 4 g  Freq: Once Route: IV  Last Dose: 4 g (09/16/24 0742)  Start: 09/16/24 0800   Admin Instructions:                0742        magnesium sulfate 4 g/100 mL IVPB (premix) 4 g  Dose: 4 g  Freq: Once Route: IV  Last Dose: 4 g (09/15/24 1815)  Start: 09/15/24 1800 End: 09/15/24 2215   Admin Instructions:               1815         metFORMIN (GLUCOPHAGE) tablet 500 mg  Dose: 500 mg  Freq: Daily with breakfast Route: PO  Start: 09/15/24 0730 End: 09/14/24 1634   Admin Instructions:              1634-D/C'd        metoprolol tartrate (LOPRESSOR) tablet 50 mg  Dose: 50 mg  Freq: Every 12 hours scheduled Route: PO  Start: 09/14/24 2100   Admin Instructions:      Order specific questions:              [phone]      0951     2100        multivitamin-minerals (CENTRUM) tablet 1 tablet  Dose: 1 tablet  Freq: Daily Route: PO  Start: 09/14/24 1600   Admin Instructions:              1557      0853      0900        multivitamin-minerals (CENTRUM) tablet 1 tablet  Dose: 1 tablet  Freq: Once Route: PO  Start: 09/14/24 1215 End:  09/14/24 1214   Admin Instructions:              1214          naltrexone (REVIA) tablet 50 mg  Dose: 50 mg  Freq: Daily Route: PO  Start: 09/14/24 1615           1714      0857      0900        nicotine (NICODERM CQ) 21 mg/24 hr TD 24 hr patch 1 patch  Dose: 1 patch  Freq: Daily Route: TD  Start: 09/14/24 1600   Admin Instructions:              1600      0815     0853      0842     0900        ondansetron (ZOFRAN) injection 4 mg  Dose: 4 mg  Freq: Once Route: IV  Start: 09/14/24 1130 End: 09/14/24 1137   Admin Instructions:              1137          pantoprazole (PROTONIX) EC tablet 40 mg  Dose: 40 mg  Freq: 2 times daily before meals Route: PO  Start: 09/14/24 1600   Admin Instructions:              1600      0606     1536      0602     1600        pravastatin (PRAVACHOL) tablet 40 mg  Dose: 40 mg  Freq: Daily with dinner Route: PO  Start: 09/14/24 1630           1600      1535      1630        ranolazine (RANEXA) 12 hr tablet 500 mg  Dose: 500 mg  Freq: Every 12 hours Route: PO  Start: 09/14/24 1600   Admin Instructions:              1600      0339     1535      0458     1600        sodium chloride 0.9 % bolus 1,000 mL  Dose: 1,000 mL  Freq: Once Route: IV  Last Dose: Stopped (09/14/24 1327)  Start: 09/14/24 1130 End: 09/14/24 1327           1137     1327          tamsulosin (FLOMAX) capsule 0.4 mg  Dose: 0.4 mg  Freq: Daily Route: PO  Start: 09/14/24 1545   Admin Instructions:              1600      0854      0900        thiamine tablet 100 mg  Dose: 100 mg  Freq: Daily Route: PO  Start: 09/14/24 1600           1600      0853      0900        thiamine tablet 100 mg  Dose: 100 mg  Freq: Once Route: PO  Start: 09/14/24 1215 End: 09/14/24 1214           1214          varenicline (CHANTIX) tablet 1 mg  Dose: 1 mg  Freq: 2 times daily Route: PO  Start: 09/14/24 1800 End: 09/14/24 1609   Admin Instructions:              1609-D/C'd                    Continuous Meds Sorted by Name  for Gregg Partida as of 09/07/24  through 9/16/24  Legend:       Medications 09/07 09/08 09/09 09/10 09/11 09/12 09/13 09/14 09/15 09/16   lactated ringers infusion  Rate: 100 mL/hr Dose: 100 mL/hr  Freq: Continuous Route: IV  Indications of Use: IV Hydration  Last Dose: 100 mL/hr (09/16/24 0602)  Start: 09/14/24 1645           1713      0139     1643      0602        Legend:       [id number]/0909/1009/1109/1209/1309/1409/1509/16        PRN Meds Sorted by Name  for Gregg Partida as of 09/07/24 through 9/16/24  Legend:       Medications 09/07 09/08 09/09 09/10 09/11 09/12 09/13 09/14 09/15 09/16   albuterol (PROVENTIL HFA,VENTOLIN HFA) inhaler 2 puff  Dose: 2 puff  Freq: Every 4 hours PRN Route: IN  PRN Reason: wheezing  Start: 09/14/24 1536   Admin Instructions:                   aluminum-magnesium hydroxide-simethicone (MAALOX) oral suspension 30 mL  Dose: 30 mL  Freq: Every 4 hours PRN Route: PO  PRN Reasons: indigestion,heartburn  Start: 09/14/24 1536   Admin Instructions:                   senna-docusate sodium (SENOKOT S) 8.6-50 mg per tablet 1 tablet  Dose: 1 tablet  Freq: Daily PRN Route: PO  PRN Reason: constipation  Start: 09/14/24 1536                              Network Utilization Review Department  ATTENTION: Please call with any questions or concerns to [phone] and carefully listen to the prompts so that you are directed to the right person. All voicemails are confidential.   For Discharge needs, contact Care Management DC Support Team at [phone] opt. 2  Send all requests for admission clinical reviews, approved or denied determinations and any other requests to dedicated fax number below belonging to the campus where the patient is receiving treatment. List of dedicated fax numbers for the Facilities:  FACILITY NAME UR FAX NUMBER   ADMISSION DENIALS (Administrative/Medical Necessity) [phone]   DISCHARGE SUPPORT TEAM (NETWORK) [phone]   PARENT CHILD HEALTH (Maternity/NICU/Pediatrics) [phone]    [facility] [phone]   [facility] [phone]   [facility] [phone]   [facility] [phone]   [facility] [phone]   [facility] [phone]   [facility] [phone]   [facility] [phone]   [facility] [phone]   [facility] [phone]   [facility] [phone]   [facility] [facility] [phone]

## 2024-09-16 NOTE — PHYSICAL THERAPY NOTE
"       PHYSICAL THERAPY EVALUATION/TREATMENT     09/16/24 1150   PT Last Visit   PT Visit Date 09/16/24   Note Type   Note type Evaluation   Pain Assessment   Pain Assessment Tool Medeiros-Baker FACES   Medeiros-Baker FACES Pain Rating 0   Restrictions/Precautions   Other Precautions Chair Alarm;Bed Alarm;Fall Risk  (tremors(LEs with movement))   Home Living   Type of Home Apartment   Home Layout One level;Elevator   Home Equipment Cane;Walker  (rollator and roller walker)   Additional Comments patient not typically using an assistive device prior to admission, although recently using a walker due to gait dysfunction with alcohol issues.   Prior Function   Level of [place] Independent with ADLs;Independent with functional mobility   Lives With Alone   Receives Help From Friend(s)  (patient has a neighbor/ friend assisting)   IADLs Independent with meal prep;Independent with medication management  (friends assist with food shoping)   Falls in the last 6 months 5 to 10  (patient states numerous falls)   General   Additional Pertinent History Chart reviewed, patient admitted with gait dysfunction, weakness, alcohol abuse, alcoholic ketoacidosis.  Patient now presents as generally weak, deconditioned with as well as with tremor present in lower extremities with activity.  Patient living alone in an apartment complex with assist of friends with alcoholic history prior to admission   Family/Caregiver Present No   Cognition   Overall Cognitive Status WFL   Arousal/Participation Cooperative   Attention Attends with cues to redirect   Orientation Level Oriented X4   Following Commands Follows multistep commands with increased time or repetition   Comments Patient impulsive at times with decreased safety awareness   Subjective   Subjective \" I really only found when I am drinking\",   RLE Assessment   RLE Assessment   (Range of motion within functional limits, strength 3+/4 -)   LLE Assessment   LLE Assessment   (Range of motion " within functional limits, strength 3+/4 -)   Coordination   Movements are Fluid and Coordinated 0   Coordination and Movement Description Decreased coordination with transfers gait activity and higher-level balance   Bed Mobility   Supine to Sit 5  Supervision   Sit to Supine 5  Supervision   Transfers   Sit to Stand 4  Minimal assistance   Additional items Assist x 1;Verbal cues   Stand to Sit 4  Minimal assistance   Additional items Assist x 1;Verbal cues   Ambulation/Elevation   Gait Assistance 3  Moderate assist   Additional items Assist x 1   Assistive Device None  (Handhold)   Distance 5 feet with unsteady gait, tremors bilateral lower extremities, ataxic type gait patterning   Balance   Static Sitting Fair +   Dynamic Sitting Fair   Static Standing Fair -   Dynamic Standing Fair -   Ambulatory Fair -   Activity Tolerance   Activity Tolerance Patient limited by fatigue;Other (Comment)  (Impulsive at times)   Nurse Made Aware Yes   Assessment   Prognosis Good   Problem List Decreased strength;Decreased range of motion;Decreased endurance;Impaired balance;Decreased mobility;Decreased coordination;Decreased safety awareness;Impaired judgement   Assessment Patient seen for Physical Therapy evaluation. Patient admitted with Ambulatory dysfunction.  Comorbidities affecting patient's physical performance include: .  Personal factors affecting patient at time of initial evaluation include: ambulating with assistive device, inability to ambulate household distances, inability to navigate community distances, inability to navigate level surfaces without external assistance, inability to perform dynamic tasks in community, limited home support, positive fall history, inability to perform physical activity, inability to perform ADLS, and inability to perform IADLS . Prior to admission, patient was independent with functional mobility with roller walker at times but mostly independent without assistive device, independent  with ADLS, requiring assist for IADLS, ambulating household distance, and ambulating community distances, home alone with assist of neighbors/friends.  Please find objective findings from Physical Therapy assessment regarding body systems outlined above with impairments and limitations including weakness, decreased ROM, impaired balance, decreased endurance, impaired coordination, gait deviations, pain, decreased activity tolerance, decreased functional mobility tolerance, and fall risk.  The Barthel Index was used as a functional outcome tool presenting with a score of Barthel Index Score: 55 today indicating marked limitations of functional mobility and ADLS.  Patient's clinical presentation is currently unstable/unpredictable as seen in patient's presentation of vital sign response, changing level of pain, increased fall risk, new onset of impairment of functional mobility, decreased endurance, and new onset of weakness. Pt would benefit from continued Physical Therapy treatment to address deficits as defined above and maximize level of functional mobility. As demonstrated by objective findings, the assigned level of complexity for this evaluation is high.The patient's -[facility] Basic Mobility Inpatient Short Form Raw Score is 19. A Raw score of greater than 16 suggests the patient may benefit from discharge to home. Please also refer to the recommendation of the Physical Therapist for safe discharge planning.   Goals   Patient Goals To move better and go home   STG Expiration Date 09/23/24   Short Term Goal #1 Transfers and gait with rolling walker with supervision   Short Term Goal #2 Gait endurance to 200 feet   LTG Expiration Date 09/30/24   Long Term Goal #1 Strength bilateral lower extremities 4/5   Long Term Goal #2 Transfers and gait independently with least restrictive assistive device for functional community distances as prior to admission   Plan   Treatment/Interventions ADL retraining;Functional  transfer training;LE strengthening/ROM;Therapeutic exercise;Endurance training;Patient/family training;Equipment eval/education;Gait training;Compensatory technique education   PT Frequency Other (Comment)  (5 times per week)   Discharge Recommendation   Rehab Resource Intensity Level, PT III (Minimum Resource Intensity)   AM-PAC Basic Mobility Inpatient   Turning in Flat Bed Without Bedrails 4   Lying on Back to Sitting on Edge of Flat Bed Without Bedrails 4   Moving Bed to Chair 3   Standing Up From Chair Using Arms 3   Walk in Room 3   Climb 3-5 Stairs With Railing 2   Basic Mobility Inpatient Raw Score 19   Basic Mobility Standardized Score 42.48   [facility] Highest Level Of Mobility   -HLM Goal 6: Walk 10 steps or more   JH-HLM Achieved 7: Walk 25 feet or more   Barthel Index   Feeding 10   Bathing 0   Grooming Score 5   Dressing Score 5   Bladder Score 10   Bowels Score 10   Toilet Use Score 5   Transfers (Bed/Chair) Score 10   Mobility (Level Surface) Score 10   Stairs Score 0   Barthel Index Score 65   Additional Treatment Session   Start Time 1135   End Time 1150   Treatment Assessment s: Patient reports no pain at this time O: Bilateral lower extremity exercise completed as listed below.  Gait training with roller walker with supervision and verbal cueing for proper walker usage 80 feet with change in direction A: Patient demonstrating improved gait safety and balance with use of roller walker and will benefit from continued physical therapy with progression as tolerated and increasing functional mobility with clinical staff as well   Exercises   Hip Flexion Sitting;10 reps;Bilateral   Hip Abduction Sitting;10 reps;Bilateral   Knee AROM Long Arc Quad Sitting;10 reps;Bilateral   Ankle Pumps Sitting;10 reps;Bilateral   Balance training  Sidestepping and backward stepping completed for balance and coordination   Licensure   [place] License Number  Heather Guerrero [id number]

## 2024-09-16 NOTE — DISCHARGE INSTR - AVS FIRST PAGE
Repeat labs (magnesium) in 1 week     Follow up on pending results: Vitamin D, Folate, Vitamin B-12

## 2024-09-17 ENCOUNTER — RA CDI HCC (OUTPATIENT)
Dept: OTHER | Facility: HOSPITAL | Age: 64
End: 2024-09-17

## 2024-09-17 ENCOUNTER — TRANSITIONAL CARE MANAGEMENT (OUTPATIENT)
Age: 64
End: 2024-09-17

## 2024-09-17 NOTE — PROGRESS NOTES
HCC coding opportunities          Chart Reviewed number of suggestions sent to Provider: 2     Patients Insurance   E11.22 and I13.0  Medicare Insurance: [facility] Medicare Complete

## 2024-09-17 NOTE — UTILIZATION REVIEW
NOTIFICATION OF ADMISSION DISCHARGE   This is a Notification of Discharge from [facility]. Please be advised that this patient has been discharge from our facility. Below you will find the admission and discharge date and time including the patient’s disposition.   UTILIZATION REVIEW CONTACT:  Yaneth Ta  Utilization   Network Utilization Review Department  Phone: [phone] x carefully listen to the prompts. All voicemails are confidential.  Email: [email or web address]     ADMISSION INFORMATION  PRESENTATION DATE: 9/14/2024 11:11 AM  OBERVATION ADMISSION DATE: N/A  INPATIENT ADMISSION DATE: 9/14/24  2:18 PM   DISCHARGE DATE: 9/16/2024  6:31 PM   DISPOSITION:Home/Self Care    Network Utilization Review Department  ATTENTION: Please call with any questions or concerns to [phone] and carefully listen to the prompts so that you are directed to the right person. All voicemails are confidential.   For Discharge needs, contact Care Management DC Support Team at [phone] opt. 2  Send all requests for admission clinical reviews, approved or denied determinations and any other requests to dedicated fax number below belonging to the campus where the patient is receiving treatment. List of dedicated fax numbers for the Facilities:  FACILITY NAME UR FAX NUMBER   ADMISSION DENIALS (Administrative/Medical Necessity) [phone]   DISCHARGE SUPPORT TEAM ([facility]) [phone]   PARENT CHILD HEALTH (Maternity/NICU/Pediatrics) [phone]   [facility] [phone]   [facility] [phone]   [facility] [phone]   [facility] [phone]   [facility] [phone]   [facility] [phone]   [facility] [phone]   [facility]  [phone]   [facility] [phone]   [facility] [phone]   [facility] [phone]   [facility] [phone]

## 2024-09-18 DIAGNOSIS — I48.0 PAROXYSMAL ATRIAL FIBRILLATION WITH RAPID VENTRICULAR RESPONSE (HCC): ICD-10-CM

## 2024-09-18 DIAGNOSIS — G95.19 EDEMA, SPINAL CORD (HCC): ICD-10-CM

## 2024-09-18 DIAGNOSIS — K21.9 GASTROESOPHAGEAL REFLUX DISEASE, UNSPECIFIED WHETHER ESOPHAGITIS PRESENT: ICD-10-CM

## 2024-09-18 RX ORDER — GABAPENTIN 300 MG/1
CAPSULE ORAL
Qty: 90 CAPSULE | Refills: 1 | Status: SHIPPED | OUTPATIENT
Start: 2024-09-18

## 2024-09-18 RX ORDER — METOPROLOL TARTRATE 50 MG
50 TABLET ORAL EVERY 12 HOURS
Qty: 60 TABLET | Refills: 0 | Status: SHIPPED | OUTPATIENT
Start: 2024-09-18

## 2024-09-18 RX ORDER — PANTOPRAZOLE SODIUM 40 MG/1
TABLET, DELAYED RELEASE ORAL
Qty: 60 TABLET | Refills: 2 | Status: SHIPPED | OUTPATIENT
Start: 2024-09-18

## 2024-09-19 ENCOUNTER — OFFICE VISIT (OUTPATIENT)
Age: 64
End: 2024-09-19

## 2024-09-19 VITALS
BODY MASS INDEX: 24.4 KG/M2 | OXYGEN SATURATION: 96 % | HEIGHT: 74 IN | DIASTOLIC BLOOD PRESSURE: 70 MMHG | SYSTOLIC BLOOD PRESSURE: 115 MMHG | WEIGHT: 190.1 LBS | TEMPERATURE: 97.8 F | HEART RATE: 75 BPM

## 2024-09-19 DIAGNOSIS — Z76.89 ENCOUNTER FOR SUPPORT AND COORDINATION OF TRANSITION OF CARE: Primary | ICD-10-CM

## 2024-09-19 DIAGNOSIS — F10.10 ALCOHOL ABUSE: Chronic | ICD-10-CM

## 2024-09-19 PROCEDURE — 99496 TRANSJ CARE MGMT HIGH F2F 7D: CPT | Performed by: FAMILY MEDICINE

## 2024-09-19 RX ORDER — NALTREXONE HYDROCHLORIDE 50 MG/1
50 TABLET, FILM COATED ORAL DAILY
Qty: 30 TABLET | Refills: 2 | Status: SHIPPED | OUTPATIENT
Start: 2024-09-19

## 2024-09-19 NOTE — ASSESSMENT & PLAN NOTE
Patient has been a chronic alcohol user for a long time but does not appear to be in acute withdrawal.    He was advised to continue his folic acid, thiamine and multivitamin.    Start naltrexone 50mg once daily    Orders:    naltrexone (REVIA) 50 mg tablet; Take 1 tablet (50 mg total) by mouth daily

## 2024-09-19 NOTE — PROGRESS NOTES
"Transition of Care Visit  Name: Gregg Partida      : 1960      MRN: [id number]  Encounter Provider: Doron Reyes MD  Encounter Date: 2024   Encounter department: [facility]    Assessment & Plan  Encounter for support and coordination of transition of care  Patient was seen in the office after being discharged from the hospital for ambulatory dysfunction after alcohol use on   Patient reports doing better than before, resting better, not drinking currently but continues to smoke  His CIWA score was 2  Exam revealed no trembling, shaking, diaphoresis, gait instability  Vitamin D and folate labs were discussed with the patient that were within normal limits   Patient was advised to repeat his magnesium levels in 1 week  Prescription was sent for naltrexone  Patient was advised to eat high-protein diet to help with muscle recovery and avoid alcohol at all costs       Alcohol abuse  Patient has been a chronic alcohol user for a long time but does not appear to be in acute withdrawal.    He was advised to continue his folic acid, thiamine and multivitamin.    Start naltrexone 50mg once daily    Orders:    naltrexone (REVIA) 50 mg tablet; Take 1 tablet (50 mg total) by mouth daily         History of Present Illness     Transitional Care Management Review:   Gregg Partida is a 64 y.o. male here for TCM follow up.     During the TCM phone call patient stated:  TCM Call       Date and time call was made  2024  2:23 PM    Hospital care reviewed  Records reviewed    Patient was hospitialized at  [facility]    Date of Admission  24    Date of discharge  24    Diagnosis  Generalized Weakness, Alcoholic Ketoacidosis    Disposition  Home    Were the patients medications reviewed and updated  Yes    Current Symptoms  Weakness  Patient states he \"still can't walk well.\"    Loose stool severity  Severe    Weakness severity  Mild    Chest pain " severity  Moderate    Chest pain onset  Other (comment)    Back pain, right side, severity  Moderate    Back pain, right side, onset  Ongoing          TCM Call       Post hospital issues  None    Should patient be enrolled in anticoag monitoring?  No    Scheduled for follow up?  Yes  TCM appt scheduled for 9/19/24 @ 1:20 PM w/ Dr. Reyes    Not clinically warranted  Patient currently admitted at [place]     Did you obtain your prescribed medications  No    Why were you unable to obtain your medications  Patient states he did not pick-up his prescriptions; states he needs several med refills. Informed patient that refills can be discussed at tomorrow's appt 9/19    Do you need help managing your prescriptions or medications  No    Is transportation to your appointment needed  No    Specify why  would like transport to inpatient rehab, referred to DESI[facility] Whittaker    I have advised the patient to call PCP with any new or worsening symptoms  Shantell Nieves RN    Living Arrangements  Alone    Support System  Family    The type of support provided  Emotional    Do you have social support  Yes, as much as I need    Are you recieving any outpatient services  No    Are you recieving home care services  No    Are you using any community resources  No    Current waiver services  No    Have you fallen in the last 12 months  No    Interperter language line needed  No    Counseling  Patient    Counseling topics  patient and family education; Importance of RX compliance    Comments  Patient did not want to schedule follow up visit, states he will call back to schedule.          History of Present Illness:     Gregg Partida is a 64 y.o. male PMHx of alcohol withdrawal syndrome, COPD, chronic HF with preserved EF, hypothyroidism, afib, HTN, depression, and GERD who presents with inability to walk today. He was unstable on his feet today. The last time he drank alcohol (quart vodka)was Wednesday or Thursday, but he is having  "trouble remembering exact days and events. He is unsure which medications he took yesterday because he is having trouble paying. He has been falling a lot but cannot recall the exact days and times he falls. He is unsure whether he hit his head or if he lost consciousness.      Hospital Course:   Patient was admitted in the hospital from 09/14 to 09/16.  Patient had CT chest, CT cervical spine, CT head which showed no abnormalities. Fall,CIWA, and Seizure precautions were added and PT/OT was placed. In regards to electrolyte abnormality patient on admission had Na 127 which was corrected, trended and repleted as needed. Telemetry was placed due to this for 24 hours then discontinued. On admission his anion gap was 24, trended with BMP daily. Patient is currently afebrile, vitally stable and AAOx3. PT/OT recommended rolling walker upon discharge and physical therapy rehab that he refused.        Review of Systems   Constitutional:  Negative for chills and fever.   HENT:  Negative for ear pain and sore throat.    Eyes:  Negative for pain and visual disturbance.   Respiratory:  Negative for cough and shortness of breath.    Cardiovascular:  Negative for chest pain and palpitations.   Gastrointestinal:  Negative for abdominal pain and vomiting.   Genitourinary:  Negative for dysuria and hematuria.   Musculoskeletal:  Negative for arthralgias and back pain.   Skin:  Negative for color change and rash.   Neurological:  Negative for seizures and syncope.   All other systems reviewed and are negative.    Objective     /70 (BP Location: Right arm, Patient Position: Sitting, Cuff Size: Standard)   Pulse 75   Temp 97.8 °F (36.6 °C)   Ht 6' 2\" (1.88 m)   Wt 86.2 kg (190 lb 1.6 oz)   SpO2 96%   BMI 24.41 kg/m²     Physical Exam  Vitals and nursing note reviewed.   Constitutional:       General: He is not in acute distress.     Appearance: He is well-developed.   HENT:      Head: Normocephalic and atraumatic.   Eyes: "      Conjunctiva/sclera: Conjunctivae normal.   Cardiovascular:      Rate and Rhythm: Normal rate and regular rhythm.      Heart sounds: No murmur heard.  Pulmonary:      Effort: Pulmonary effort is normal. No respiratory distress.      Breath sounds: Normal breath sounds.   Abdominal:      Palpations: Abdomen is soft.      Tenderness: There is no abdominal tenderness.   Musculoskeletal:         General: No swelling.      Cervical back: Neck supple.   Skin:     General: Skin is warm and dry.      Capillary Refill: Capillary refill takes less than 2 seconds.   Neurological:      Mental Status: He is alert.   Psychiatric:         Mood and Affect: Mood normal.         Doron Reyes MD  PGY2 Family Medicine Residency Program  [facility]

## 2024-09-20 DIAGNOSIS — F10.10 ALCOHOL ABUSE: Chronic | ICD-10-CM

## 2024-09-20 RX ORDER — LANOLIN ALCOHOL/MO/W.PET/CERES
400 CREAM (GRAM) TOPICAL 2 TIMES DAILY
Qty: 30 TABLET | Refills: 0 | Status: SHIPPED | OUTPATIENT
Start: 2024-09-20

## 2024-09-20 NOTE — TELEPHONE ENCOUNTER
Patient is out of medication, and was just in hospital for low magnesium. He is requesting this today . Please Review,Thank you

## 2024-09-26 ENCOUNTER — TELEPHONE (OUTPATIENT)
Dept: GASTROENTEROLOGY | Facility: CLINIC | Age: 64
End: 2024-09-26

## 2024-09-26 NOTE — TELEPHONE ENCOUNTER
Patient is due for a 1 year repeat colon for a poor prep with Dr Ferrara in late December. I called and spoke with pt to see if he would like to schedule and he stated he had to call me back.

## 2024-10-01 DIAGNOSIS — Z59.9 FINANCIAL DIFFICULTIES: Primary | ICD-10-CM

## 2024-10-01 DIAGNOSIS — I10 ESSENTIAL (PRIMARY) HYPERTENSION: Chronic | ICD-10-CM

## 2024-10-01 DIAGNOSIS — E78.5 DYSLIPIDEMIA: Primary | ICD-10-CM

## 2024-10-01 RX ORDER — PRAVASTATIN SODIUM 40 MG
TABLET ORAL
Qty: 90 TABLET | Refills: 2 | Status: SHIPPED | OUTPATIENT
Start: 2024-10-01 | End: 2024-10-01

## 2024-10-01 RX ORDER — PRAVASTATIN SODIUM 40 MG
40 TABLET ORAL
Qty: 90 TABLET | Refills: 2 | OUTPATIENT
Start: 2024-10-01

## 2024-10-01 SDOH — ECONOMIC STABILITY - INCOME SECURITY: PROBLEM RELATED TO HOUSING AND ECONOMIC CIRCUMSTANCES, UNSPECIFIED: Z59.9

## 2024-10-02 NOTE — PLAN OF CARE

## 2024-10-03 ENCOUNTER — PATIENT OUTREACH (OUTPATIENT)
Age: 64
End: 2024-10-03

## 2024-10-03 DIAGNOSIS — Z59.9 FINANCIAL DIFFICULTIES: Primary | ICD-10-CM

## 2024-10-03 SDOH — ECONOMIC STABILITY - INCOME SECURITY: PROBLEM RELATED TO HOUSING AND ECONOMIC CIRCUMSTANCES, UNSPECIFIED: Z59.9

## 2024-10-03 NOTE — PROGRESS NOTES
"Referral received. Reason for referral is \"financial difficulties.\" Referral placed for [facility] outreach . Note routed to [facility] access hospital.   "

## 2024-10-14 ENCOUNTER — PATIENT OUTREACH (OUTPATIENT)
Age: 64
End: 2024-10-14

## 2024-10-14 NOTE — PROGRESS NOTES
AARTI received IB from GISSELL Whittaker requesting patient outreach due to financial insecurity.    SWCM completed chart review. SWCM called patient to follow up and assist with needs. SWCM unable to reach patient. Left voice message requesting return call. Contact information provided.      SWCM will continue to follow up and remain available to assist as needed.

## 2024-10-30 ENCOUNTER — APPOINTMENT (EMERGENCY)
Dept: RADIOLOGY | Facility: HOSPITAL | Age: 64
End: 2024-10-30
Payer: COMMERCIAL

## 2024-10-30 ENCOUNTER — HOSPITAL ENCOUNTER (EMERGENCY)
Facility: HOSPITAL | Age: 64
Discharge: HOME | End: 2024-10-31
Attending: EMERGENCY MEDICINE
Payer: COMMERCIAL

## 2024-10-30 DIAGNOSIS — F10.929 ALCOHOL INTOXICATION (HCC): Primary | ICD-10-CM

## 2024-10-30 LAB
ALBUMIN SERPL BCG-MCNC: 3.9 G/DL (ref 3.5–5)
ALP SERPL-CCNC: 88 U/L (ref 34–104)
ALT SERPL W P-5'-P-CCNC: 42 U/L (ref 7–52)
ANION GAP SERPL CALCULATED.3IONS-SCNC: 11 MMOL/L (ref 4–13)
APTT PPP: 27 SECONDS (ref 23–34)
AST SERPL W P-5'-P-CCNC: 75 U/L (ref 13–39)
ATRIAL RATE: 241 BPM
ATRIAL RATE: 98 BPM
BASOPHILS # BLD AUTO: 0.11 THOUSANDS/ΜL (ref 0–0.1)
BASOPHILS NFR BLD AUTO: 2 % (ref 0–1)
BILIRUB SERPL-MCNC: 0.31 MG/DL (ref 0.2–1)
BUN SERPL-MCNC: 14 MG/DL (ref 5–25)
CALCIUM SERPL-MCNC: 8.6 MG/DL (ref 8.4–10.2)
CHLORIDE SERPL-SCNC: 101 MMOL/L (ref 96–108)
CO2 SERPL-SCNC: 27 MMOL/L (ref 21–32)
CREAT SERPL-MCNC: 0.93 MG/DL (ref 0.6–1.3)
EOSINOPHIL # BLD AUTO: 0.03 THOUSAND/ΜL (ref 0–0.61)
EOSINOPHIL NFR BLD AUTO: 1 % (ref 0–6)
ERYTHROCYTE [DISTWIDTH] IN BLOOD BY AUTOMATED COUNT: 16 % (ref 11.6–15.1)
ETHANOL SERPL-MCNC: 377 MG/DL
GFR SERPL CREATININE-BSD FRML MDRD: 87 ML/MIN/1.73SQ M
GLUCOSE SERPL-MCNC: 90 MG/DL (ref 65–140)
HCT VFR BLD AUTO: 38 % (ref 36.5–49.3)
HGB BLD-MCNC: 12.5 G/DL (ref 12–17)
IMM GRANULOCYTES # BLD AUTO: 0.01 THOUSAND/UL (ref 0–0.2)
IMM GRANULOCYTES NFR BLD AUTO: 0 % (ref 0–2)
INR PPP: 0.88 (ref 0.85–1.19)
LYMPHOCYTES # BLD AUTO: 0.98 THOUSANDS/ΜL (ref 0.6–4.47)
LYMPHOCYTES NFR BLD AUTO: 21 % (ref 14–44)
MCH RBC QN AUTO: 32.4 PG (ref 26.8–34.3)
MCHC RBC AUTO-ENTMCNC: 32.9 G/DL (ref 31.4–37.4)
MCV RBC AUTO: 98 FL (ref 82–98)
MONOCYTES # BLD AUTO: 0.86 THOUSAND/ΜL (ref 0.17–1.22)
MONOCYTES NFR BLD AUTO: 19 % (ref 4–12)
NEUTROPHILS # BLD AUTO: 2.6 THOUSANDS/ΜL (ref 1.85–7.62)
NEUTS SEG NFR BLD AUTO: 57 % (ref 43–75)
NRBC BLD AUTO-RTO: 0 /100 WBCS
P AXIS: 105 DEGREES
P AXIS: 78 DEGREES
PLATELET # BLD AUTO: 155 THOUSANDS/UL (ref 149–390)
PMV BLD AUTO: 10 FL (ref 8.9–12.7)
POTASSIUM SERPL-SCNC: 4.2 MMOL/L (ref 3.5–5.3)
PR INTERVAL: 138 MS
PROT SERPL-MCNC: 6.8 G/DL (ref 6.4–8.4)
PROTHROMBIN TIME: 12.5 SECONDS (ref 12.3–15)
QRS AXIS: 86 DEGREES
QRS AXIS: 90 DEGREES
QRSD INTERVAL: 86 MS
QRSD INTERVAL: 88 MS
QT INTERVAL: 292 MS
QT INTERVAL: 340 MS
QTC INTERVAL: 434 MS
QTC INTERVAL: 445 MS
RBC # BLD AUTO: 3.86 MILLION/UL (ref 3.88–5.62)
SODIUM SERPL-SCNC: 139 MMOL/L (ref 135–147)
T WAVE AXIS: -3 DEGREES
T WAVE AXIS: 63 DEGREES
VENTRICULAR RATE: 140 BPM
VENTRICULAR RATE: 98 BPM
WBC # BLD AUTO: 4.59 THOUSAND/UL (ref 4.31–10.16)

## 2024-10-30 PROCEDURE — 82077 ASSAY SPEC XCP UR&BREATH IA: CPT | Performed by: EMERGENCY MEDICINE

## 2024-10-30 PROCEDURE — 99285 EMERGENCY DEPT VISIT HI MDM: CPT

## 2024-10-30 PROCEDURE — 36415 COLL VENOUS BLD VENIPUNCTURE: CPT | Performed by: EMERGENCY MEDICINE

## 2024-10-30 PROCEDURE — 85730 THROMBOPLASTIN TIME PARTIAL: CPT | Performed by: EMERGENCY MEDICINE

## 2024-10-30 PROCEDURE — 72125 CT NECK SPINE W/O DYE: CPT

## 2024-10-30 PROCEDURE — 71045 X-RAY EXAM CHEST 1 VIEW: CPT

## 2024-10-30 PROCEDURE — 85610 PROTHROMBIN TIME: CPT | Performed by: EMERGENCY MEDICINE

## 2024-10-30 PROCEDURE — 70450 CT HEAD/BRAIN W/O DYE: CPT

## 2024-10-30 PROCEDURE — 85025 COMPLETE CBC W/AUTO DIFF WBC: CPT | Performed by: EMERGENCY MEDICINE

## 2024-10-30 PROCEDURE — 93005 ELECTROCARDIOGRAM TRACING: CPT

## 2024-10-30 PROCEDURE — 96361 HYDRATE IV INFUSION ADD-ON: CPT

## 2024-10-30 PROCEDURE — 99285 EMERGENCY DEPT VISIT HI MDM: CPT | Performed by: EMERGENCY MEDICINE

## 2024-10-30 PROCEDURE — 96374 THER/PROPH/DIAG INJ IV PUSH: CPT

## 2024-10-30 PROCEDURE — 93010 ELECTROCARDIOGRAM REPORT: CPT | Performed by: INTERNAL MEDICINE

## 2024-10-30 PROCEDURE — 80053 COMPREHEN METABOLIC PANEL: CPT | Performed by: EMERGENCY MEDICINE

## 2024-10-30 RX ORDER — SODIUM CHLORIDE 9 MG/ML
1000 INJECTION, SOLUTION INTRAVENOUS CONTINUOUS
Status: DISCONTINUED | OUTPATIENT
Start: 2024-10-30 | End: 2024-10-31 | Stop reason: HOSPADM

## 2024-10-30 RX ORDER — LORAZEPAM 2 MG/ML
1 INJECTION INTRAMUSCULAR ONCE
Status: COMPLETED | OUTPATIENT
Start: 2024-10-30 | End: 2024-10-30

## 2024-10-30 RX ORDER — METOPROLOL TARTRATE 50 MG
50 TABLET ORAL ONCE
Status: COMPLETED | OUTPATIENT
Start: 2024-10-30 | End: 2024-10-30

## 2024-10-30 RX ADMIN — APIXABAN 5 MG: 5 TABLET, FILM COATED ORAL at 20:32

## 2024-10-30 RX ADMIN — SODIUM CHLORIDE 1000 ML/HR: 0.9 INJECTION, SOLUTION INTRAVENOUS at 20:45

## 2024-10-30 RX ADMIN — LORAZEPAM 1 MG: 2 INJECTION INTRAMUSCULAR; INTRAVENOUS at 22:32

## 2024-10-30 RX ADMIN — METOPROLOL TARTRATE 50 MG: 50 TABLET, FILM COATED ORAL at 20:27

## 2024-10-30 NOTE — ED PROVIDER NOTES
Time reflects when diagnosis was documented in both MDM as applicable and the Disposition within this note       Time User Action Codes Description Comment    10/30/2024  4:40 PM Mili Patel Add [F10.929] Alcohol intoxication (HCC)           ED Disposition       ED Disposition   Discharge    Condition   Stable    Date/Time   Wed Oct 30, 2024  4:40 PM    Comment   Gregg Partida discharge to home/self care.                   Assessment & Plan       Medical Decision Making  Pt is a 63yo M who presents with alcohol intoxication.     Symptoms likely secondary to alcohol intoxication, however with recent fall we will also obtain basic blood work and CT scans.  Will treat symptomatically and continue to monitor.  See ED course for results and details.    Still awaiting sobriety at time of sign out. Pt signed out to oncoming physician with plan for DC once sober.        Amount and/or Complexity of Data Reviewed  Labs: ordered. Decision-making details documented in ED Course.  Radiology: ordered. Decision-making details documented in ED Course.  ECG/medicine tests:  Decision-making details documented in ED Course.        ED Course as of 10/30/24 1833   Wed Oct 30, 2024   1508 ECG 12 lead  Procedure Note: EKG  Date/Time: 10/30/24 3:08 PM   Interpreted by: Mili Patel MD  Indications / Diagnosis: Fall, unknown cause  ECG reviewed by me, the ED Physician: yes   The EKG demonstrates:  Rhythm: normal sinus  Intervals: normal intervals  Axis: normal axis  QRS/Blocks: normal QRS  ST Changes: No acute ST Changes, no STD/KAREN.   1525 Pt in CT.    1553 CT head wo contrast  No acute intracranial abnormality.   1553 XR chest portable  Findings most suggestive of mild CHF with some perihilar edema.   1605 CT spine cervical without contrast  Stable. No traumatic findings.    1618 CBC and differential(!)  Reviewed and without actionable derangement.    1633 PTT: 27  WNL   1633 POCT INR: 0.88  WNL   1638 ETHANOL(!):  377  Elevated. Cleared at 9990.    4223 Comprehensive metabolic panel(!)  Reviewed and without actionable derangement.        Medications - No data to display    ED Risk Strat Scores                           SBIRT 20yo+      Flowsheet Row Most Recent Value   Initial Alcohol Screen: US AUDIT-C     1. How often do you have a drink containing alcohol? 6 Filed at: 10/30/2024 1447   2. How many drinks containing alcohol do you have on a typical day you are drinking?  --  [unable to answer very intoxicated] Filed at: 10/30/2024 1446   3a. Male UNDER 65: How often do you have five or more drinks on one occasion? --  [unable to answer very intoxicated] Filed at: 10/30/2024 1446   Audit-C Score 6 Filed at: 10/30/2024 1446                            History of Present Illness       Chief Complaint   Patient presents with    Alcohol Intoxication     Squad was called because he was on floor intoxicated,  Squad reported that he fell yesterday due AMS.  Uncertain whether the AMS was from the Alcohol intoxication       Past Medical History:   Diagnosis Date    Acute on chronic kidney failure  (HCC)     Alcohol withdrawal (HCC) 06/07/2019    Atrial fibrillation (HCC)     Cancer (HCC)     prostate ca,had radiation    Cardiac disease     stents,then triple bypass    COPD (chronic obstructive pulmonary disease) (HCC)     Coronary artery disease     ETOH abuse     Heart failure (HCC)     History of heart surgery     says triple bypass [facility]    Hx of heart artery stent     2014    Hyperlipidemia     Hypertension     Hyponatremia 10/17/2019    Hypovolemic shock (HCC) 12/22/2019    Lumbar spondylitis (HCC) 10/13/2022    Metabolic acidosis, increased anion gap 04/21/2021    Nasal bone fracture 10/10/2022    Prostate CA (HCC)     S/P CABG x 3     2004    Sleep apnea       Past Surgical History:   Procedure Laterality Date    CARDIAC CATHETERIZATION      2 stents    CORONARY ARTERY BYPASS GRAFT  2004    OH ARTHRD ANT INTERBODY MIN  "DSC CRV BELOW C2 N/A 12/16/2020    Procedure: Anterior cervical discectomy with fusion C4-C7; Posterior cervical decompression and fusion C2-T2;  Surgeon: David Rowell MD;  Location: [facility];  Service: Neurosurgery    TONSILLECTOMY        Family History   Problem Relation Age of Onset    Diabetes Mother     Uterine cancer Mother     COPD Father     Hypertension Father       Social History     Tobacco Use    Smoking status: Every Day     Current packs/day: 1.50     Average packs/day: 1.5 packs/day for 40.0 years (60.0 ttl pk-yrs)     Types: Cigarettes    Smokeless tobacco: Never   Vaping Use    Vaping status: Never Used   Substance Use Topics    Alcohol use: Yes     Alcohol/week: 4.0 standard drinks of alcohol     Types: 4 Standard drinks or equivalent per week     Comment: quart of vodka daily    Drug use: No      E-Cigarette/Vaping    E-Cigarette Use Never User       E-Cigarette/Vaping Substances    Nicotine Yes     THC No     CBD No     Flavoring No     Other No     Unknown No       I have reviewed and agree with the history as documented.     Pt is a 63yo M who presents for alcohol intoxication and fall.  Patient was brought in by EMS.  Per EMS, patient reported a fall to them yesterday.  Today patient was found down and was altered and therefore EMS was called.  Patient reports he did not fall today but does remember falling yesterday.  Patient reports he did strike his head yesterday but is unsure if he lost consciousness.  Patient denies any pain or complaints at this time.  Patient is a daily alcohol user and states that he drinks \"not enough\" alcohol today when asked how much he has had.  Per chart review, last tetanus shot 2020.           Objective       ED Triage Vitals [10/30/24 1457]   Temperature Pulse Blood Pressure Respirations SpO2 Patient Position - Orthostatic VS   (!) 96.8 °F (36 °C) 105 156/75 20 95 % --      Temp Source Heart Rate Source BP Location FiO2 (%) Pain Score    Tympanic Monitor -- " -- --      Vitals      Date and Time Temp Pulse SpO2 Resp BP Pain Score FACES Pain Rating User   10/30/24 1457 96.8 °F (36 °C) 105 95 % 95 in 2 liters 20 156/75 -- -- [facility]            Physical Exam  Vitals reviewed.   Constitutional:       Appearance: He is well-developed. He is not toxic-appearing or diaphoretic.   HENT:      Head: Normocephalic.        Right Ear: External ear normal.      Left Ear: External ear normal.      Nose: Nose normal.   Eyes:      Conjunctiva/sclera: Conjunctivae normal.      Pupils: Pupils are equal, round, and reactive to light.   Cardiovascular:      Rate and Rhythm: Normal rate and regular rhythm.      Heart sounds: Normal heart sounds.   Pulmonary:      Effort: Pulmonary effort is normal. No respiratory distress.      Breath sounds: Normal breath sounds.   Abdominal:      General: Bowel sounds are normal. There is no distension.      Palpations: Abdomen is soft.      Tenderness: There is no abdominal tenderness.   Musculoskeletal:         General: Normal range of motion.      Cervical back: Neck supple.   Skin:     General: Skin is warm and dry.      Capillary Refill: Capillary refill takes less than 2 seconds.      Coloration: Skin is not pale.   Neurological:      General: No focal deficit present.      Mental Status: He is alert and oriented to person, place, and time.   Psychiatric:         Speech: Speech is slurred.         Results Reviewed       Procedure Component Value Units Date/Time    Comprehensive metabolic panel [[id number]]  (Abnormal) Collected: 10/30/24 1607    Lab Status: Final result Specimen: Blood from Arm, Right Updated: 10/30/24 1639     Sodium 139 mmol/L      Potassium 4.2 mmol/L      Chloride 101 mmol/L      CO2 27 mmol/L      ANION GAP 11 mmol/L      BUN 14 mg/dL      Creatinine 0.93 mg/dL      Glucose 90 mg/dL      Calcium 8.6 mg/dL      AST 75 U/L      ALT 42 U/L      Alkaline Phosphatase 88 U/L      Total Protein 6.8 g/dL      Albumin 3.9 g/dL      Total  Bilirubin 0.31 mg/dL      eGFR 87 ml/min/1.73sq m     Narrative:      National Kidney Disease Foundation guidelines for Chronic Kidney Disease (CKD):     Stage 1 with normal or high GFR (GFR > 90 mL/min/1.73 square meters)    Stage 2 Mild CKD (GFR = 60-89 mL/min/1.73 square meters)    Stage 3A Moderate CKD (GFR = 45-59 mL/min/1.73 square meters)    Stage 3B Moderate CKD (GFR = 30-44 mL/min/1.73 square meters)    Stage 4 Severe CKD (GFR = 15-29 mL/min/1.73 square meters)    Stage 5 End Stage CKD (GFR <15 mL/min/1.73 square meters)  Note: GFR calculation is accurate only with a steady state creatinine    Ethanol [[id number]]  (Abnormal) Collected: 10/30/24 1607    Lab Status: Final result Specimen: Blood from Arm, Right Updated: 10/30/24 1638     Ethanol Lvl 377 mg/dL     Protime-INR [[id number]]  (Normal) Collected: 10/30/24 1607    Lab Status: Final result Specimen: Blood from Arm, Right Updated: 10/30/24 1632     Protime 12.5 seconds      INR 0.88    Narrative:      INR Therapeutic Range    Indication                                             INR Range      Atrial Fibrillation                                               2.0-3.0  Hypercoagulable State                                    2.0.2.3  Left Ventricular Asist Device                            2.0-3.0  Mechanical Heart Valve                                  -    Aortic(with afib, MI, embolism, HF, LA enlargement,    and/or coagulopathy)                                     2.0-3.0 (2.5-3.5)     Mitral                                                             2.5-3.5  Prosthetic/Bioprosthetic Heart Valve               2.0-3.0  Venous thromboembolism (VTE: VT, PE        2.0-3.0    APTT [[id number]]  (Normal) Collected: 10/30/24 1607    Lab Status: Final result Specimen: Blood from Arm, Right Updated: 10/30/24 1632     PTT 27 seconds     CBC and differential [[id number]]  (Abnormal) Collected: 10/30/24 1607    Lab Status: Final result Specimen: Blood from Arm,  Right Updated: 10/30/24 1618     WBC 4.59 Thousand/uL      RBC 3.86 Million/uL      Hemoglobin 12.5 g/dL      Hematocrit 38.0 %      MCV 98 fL      MCH 32.4 pg      MCHC 32.9 g/dL      RDW 16.0 %      MPV 10.0 fL      Platelets 155 Thousands/uL      nRBC 0 /100 WBCs      Segmented % 57 %      Immature Grans % 0 %      Lymphocytes % 21 %      Monocytes % 19 %      Eosinophils Relative 1 %      Basophils Relative 2 %      Absolute Neutrophils 2.60 Thousands/µL      Absolute Immature Grans 0.01 Thousand/uL      Absolute Lymphocytes 0.98 Thousands/µL      Absolute Monocytes 0.86 Thousand/µL      Eosinophils Absolute 0.03 Thousand/µL      Basophils Absolute 0.11 Thousands/µL             XR chest portable   Final Interpretation by Angel Smart MD (10/30 1545)      Findings most suggestive of mild CHF with some perihilar edema.            Workstation performed: [id number]         CT head wo contrast   Final Interpretation by Kirit Rueda DO (10/30 1547)      No acute intracranial abnormality.                  Workstation performed: [id number]         CT spine cervical without contrast   Final Interpretation by Kirit Rueda DO (10/30 1600)      Stable postoperative alignment status post extensive anterior and posterior cervical spinal fusion and multilevel laminectomy. No traumatic malalignment identified.      Postsurgical chronic fluid collection in the laminectomy bed likely chronic hematoma/seroma unchanged from prior.      Lucency surrounds bilateral T1 screws suggesting hardware loosening.      The bilateral vertical support rods appear partially dislodged from the T2 screws bilaterally. This is unchanged from the prior study.                  Workstation performed: [id number]             Procedures    ED Medication and Procedure Management   Prior to Admission Medications   Prescriptions Last Dose Informant Patient Reported? Taking?   Blood Glucose Monitoring Suppl (ONE TOUCH ULTRA MINI)  w/Device KIT  Self Yes No   Sig: Use as directed   Patient not taking: Reported on 3/25/2024   Blood Pressure Monitoring (Adult Blood Pressure Cuff Lg) KIT   No No   Sig: Use in the morning   Patient not taking: Reported on 3/25/2024   Breo Ellipta 200-25 MCG/ACT inhaler  Self No No   Sig: Inhale 1 puff daily Rinse mouth after use.   ONETOUCH DELICA LANCETS FINE MISC  Self Yes No   Sig: 3 (three) times a day Test   Thiamine HCl (vitamin B-1) 100 MG TABS  Self No No   Sig: TAKE 1 TABLET DAILY   albuterol (Ventolin HFA) 90 mcg/act inhaler  Self No No   Sig: Inhale 2 puffs every 4 (four) hours as needed for wheezing   aluminum-magnesium hydroxide-simethicone (MAALOX) 3521-3621-622 mg/30 mL suspension   No No   Sig: Take 30 mL by mouth every 4 (four) hours as needed for indigestion or heartburn   apixaban (Eliquis) 5 mg   No No   Sig: TAKE 1 TABLET BY MOUTH 2 TIMES A DAY DO NOT START BEFORE JANUARY 31, 2024.   aspirin (ECOTRIN LOW STRENGTH) 81 mg EC tablet  Self Yes No   Sig: Take 81 mg by mouth daily   ferrous sulfate 325 (65 Fe) mg tablet  Self No No   Sig: Take 1 tablet (325 mg total) by mouth daily with breakfast   folic acid (FOLVITE) 1 mg tablet   No No   Sig: TAKE 1 TABLET DAILY   gabapentin (NEURONTIN) 300 mg capsule   No No   Sig: TAKE ONE CAPSULE THREE TIMES DAILY   magnesium Oxide (MAG-OX) 400 mg TABS   No No   Sig: Take 1 tablet (400 mg total) by mouth 2 (two) times a day   metFORMIN (GLUCOPHAGE) 500 mg tablet   No No   Sig: TAKE 1 TABLET DAILY WITH BREAKFAST   metoprolol tartrate (LOPRESSOR) 50 mg tablet   No No   Sig: TAKE 1 TABLET BY MOUTH EVERY 12 HOURS   naltrexone (REVIA) 50 mg tablet   No No   Sig: Take 1 tablet (50 mg total) by mouth daily   nicotine (NICODERM CQ) 21 mg/24 hr TD 24 hr patch  Self No No   Sig: Place 1 patch on the skin over 24 hours daily Do not start before December 4, 2023.   pantoprazole (PROTONIX) 40 mg tablet   No No   Sig: TAKE 1 TABLET TWO TIMES A DAY   ranolazine (RANEXA) 500  mg 12 hr tablet   No No   Sig: TAKE 1 TABLET EVERY 12 HOURS   senna-docusate sodium (SENOKOT S) 8.6-50 mg per tablet   No No   Sig: Take 1 tablet by mouth daily as needed for constipation   tamsulosin (FLOMAX) 0.4 mg   No No   Sig: TAKE 1 CAPSULE DAILY      Facility-Administered Medications: None     Patient's Medications   Discharge Prescriptions    No medications on file     No discharge procedures on file.  ED SEPSIS DOCUMENTATION   Time reflects when diagnosis was documented in both MDM as applicable and the Disposition within this note       Time User Action Codes Description Comment    10/30/2024  4:40 PM Mili Patel Add [F10.929] Alcohol intoxication (HCC)                  Mili Patel MD  10/30/24 1838

## 2024-10-30 NOTE — DISCHARGE INSTRUCTIONS
Follow-up with primary care for further care. Contact info provided below if needed.  Return to the ED with new or worsening symptoms.

## 2024-10-31 ENCOUNTER — PATIENT OUTREACH (OUTPATIENT)
Age: 64
End: 2024-10-31

## 2024-10-31 VITALS
HEART RATE: 83 BPM | OXYGEN SATURATION: 93 % | DIASTOLIC BLOOD PRESSURE: 97 MMHG | RESPIRATION RATE: 21 BRPM | TEMPERATURE: 96.8 F | SYSTOLIC BLOOD PRESSURE: 187 MMHG

## 2024-10-31 PROCEDURE — 96361 HYDRATE IV INFUSION ADD-ON: CPT

## 2024-10-31 RX ORDER — MORPHINE SULFATE 4 MG/ML
4 INJECTION, SOLUTION INTRAMUSCULAR; INTRAVENOUS ONCE
Status: DISCONTINUED | OUTPATIENT
Start: 2024-10-31 | End: 2024-10-31

## 2024-10-31 NOTE — PROGRESS NOTES
MERTCM received IB ADT discharge alert from [facility], Unit: [place] [facility].     AARTI completed chart review. Per chart, presented to ED with alcohol intoxication and fall on 10/30. Patient was brought in by EMS. Per chart, patient reported striking his head. Per chart, patient is a daily alcohol user. Per chart, plan for DC once sober. Per chart, patient discharged on 10/31.    SW will continue to follow up and remain available to assist as needed.

## 2024-10-31 NOTE — ED CARE HANDOFF
Emergency Department Sign Out Note        Sign out and transfer of care from Dr. Patel. See Separate Emergency Department note.     The patient, Gregg Partida, was evaluated by the previous provider for ETO H intoxication.    Workup Completed:  Medical workup has been completed patient was stable with a blood alcohol 377 due to clear by 3 AM    ED Course / Workup Pending (followup):  Shortly after signout patient's heart rate went to 160 in atrial fibrillation.  Patient states he has a history of similar in the past and has not taken any of his medication today.  Patient was given 50 of Lopressor p.o. as well as his Eliquis which she has not taken either 1 today.  Also started IV fluid on the patient approximately 30 minutes later patient's heart rate went back down to rate of 90s with a blood pressure 136 systolic.  Patient still awake alert resting comfortably                                     Procedures  Medical Decision Making  Amount and/or Complexity of Data Reviewed  Labs: ordered.  Radiology: ordered.    Risk  Prescription drug management.            Disposition  Final diagnoses:   Alcohol intoxication (HCC)     Time reflects when diagnosis was documented in both MDM as applicable and the Disposition within this note       Time User Action Codes Description Comment    10/30/2024  4:40 PM Mili Patel Add [F10.929] Alcohol intoxication (HCC)           ED Disposition       ED Disposition   Discharge    Condition   Stable    Date/Time   Wed Oct 30, 2024  4:40 PM    Comment   Gregg Partida discharge to home/self care.                   Follow-up Information       Follow up With Specialties Details Why Contact Info    [employer]  Call  As needed [phone]            Patient's Medications   Discharge Prescriptions    No medications on file     No discharge procedures on file.       ED Provider  Electronically Signed by     Valentin Monk DO  10/30/24 4417

## 2024-10-31 NOTE — ED NOTES
Lyft was ordered per pt request, pt ended up finding own ride     Samantha M Goodell, RN  10/31/24 0146

## 2024-11-04 ENCOUNTER — APPOINTMENT (EMERGENCY)
Dept: RADIOLOGY | Facility: HOSPITAL | Age: 64
End: 2024-11-04
Payer: COMMERCIAL

## 2024-11-04 ENCOUNTER — HOSPITAL ENCOUNTER (EMERGENCY)
Facility: HOSPITAL | Age: 64
Discharge: HOME | End: 2024-11-04
Payer: COMMERCIAL

## 2024-11-04 VITALS
HEART RATE: 102 BPM | RESPIRATION RATE: 18 BRPM | TEMPERATURE: 98.1 F | DIASTOLIC BLOOD PRESSURE: 75 MMHG | OXYGEN SATURATION: 93 % | SYSTOLIC BLOOD PRESSURE: 120 MMHG

## 2024-11-04 DIAGNOSIS — M54.9 BACK PAIN: Primary | ICD-10-CM

## 2024-11-04 DIAGNOSIS — T14.8XXA CONTUSION: ICD-10-CM

## 2024-11-04 LAB
2HR DELTA HS TROPONIN: 0 NG/L
ALBUMIN SERPL BCG-MCNC: 3.9 G/DL (ref 3.5–5)
ALP SERPL-CCNC: 89 U/L (ref 34–104)
ALT SERPL W P-5'-P-CCNC: 37 U/L (ref 7–52)
ANION GAP SERPL CALCULATED.3IONS-SCNC: 14 MMOL/L (ref 4–13)
AST SERPL W P-5'-P-CCNC: 51 U/L (ref 13–39)
BASOPHILS # BLD AUTO: 0.09 THOUSANDS/ÂΜL (ref 0–0.1)
BASOPHILS NFR BLD AUTO: 1 % (ref 0–1)
BILIRUB SERPL-MCNC: 0.62 MG/DL (ref 0.2–1)
BUN SERPL-MCNC: 18 MG/DL (ref 5–25)
CALCIUM SERPL-MCNC: 8.3 MG/DL (ref 8.4–10.2)
CARDIAC TROPONIN I PNL SERPL HS: 46 NG/L
CARDIAC TROPONIN I PNL SERPL HS: 46 NG/L
CHLORIDE SERPL-SCNC: 97 MMOL/L (ref 96–108)
CO2 SERPL-SCNC: 24 MMOL/L (ref 21–32)
CREAT SERPL-MCNC: 1.27 MG/DL (ref 0.6–1.3)
EOSINOPHIL # BLD AUTO: 0.01 THOUSAND/ÂΜL (ref 0–0.61)
EOSINOPHIL NFR BLD AUTO: 0 % (ref 0–6)
ERYTHROCYTE [DISTWIDTH] IN BLOOD BY AUTOMATED COUNT: 15.8 % (ref 11.6–15.1)
GFR SERPL CREATININE-BSD FRML MDRD: 60 ML/MIN/1.73SQ M
GLUCOSE SERPL-MCNC: 109 MG/DL (ref 65–140)
HCT VFR BLD AUTO: 39.1 % (ref 36.5–49.3)
HGB BLD-MCNC: 13.4 G/DL (ref 12–17)
IMM GRANULOCYTES # BLD AUTO: 0.02 THOUSAND/UL (ref 0–0.2)
IMM GRANULOCYTES NFR BLD AUTO: 0 % (ref 0–2)
LYMPHOCYTES # BLD AUTO: 1.44 THOUSANDS/ÂΜL (ref 0.6–4.47)
LYMPHOCYTES NFR BLD AUTO: 22 % (ref 14–44)
MCH RBC QN AUTO: 32.4 PG (ref 26.8–34.3)
MCHC RBC AUTO-ENTMCNC: 34.3 G/DL (ref 31.4–37.4)
MCV RBC AUTO: 95 FL (ref 82–98)
MONOCYTES # BLD AUTO: 0.73 THOUSAND/ÂΜL (ref 0.17–1.22)
MONOCYTES NFR BLD AUTO: 11 % (ref 4–12)
NEUTROPHILS # BLD AUTO: 4.25 THOUSANDS/ÂΜL (ref 1.85–7.62)
NEUTS SEG NFR BLD AUTO: 66 % (ref 43–75)
NRBC BLD AUTO-RTO: 0 /100 WBCS
PLATELET # BLD AUTO: 101 THOUSANDS/UL (ref 149–390)
PMV BLD AUTO: 10.3 FL (ref 8.9–12.7)
POTASSIUM SERPL-SCNC: 3.6 MMOL/L (ref 3.5–5.3)
PROT SERPL-MCNC: 7.2 G/DL (ref 6.4–8.4)
RBC # BLD AUTO: 4.13 MILLION/UL (ref 3.88–5.62)
SODIUM SERPL-SCNC: 135 MMOL/L (ref 135–147)
WBC # BLD AUTO: 6.54 THOUSAND/UL (ref 4.31–10.16)

## 2024-11-04 PROCEDURE — 84484 ASSAY OF TROPONIN QUANT: CPT

## 2024-11-04 PROCEDURE — 93005 ELECTROCARDIOGRAM TRACING: CPT

## 2024-11-04 PROCEDURE — 99285 EMERGENCY DEPT VISIT HI MDM: CPT

## 2024-11-04 PROCEDURE — 96374 THER/PROPH/DIAG INJ IV PUSH: CPT

## 2024-11-04 PROCEDURE — 71045 X-RAY EXAM CHEST 1 VIEW: CPT

## 2024-11-04 PROCEDURE — 85025 COMPLETE CBC W/AUTO DIFF WBC: CPT

## 2024-11-04 PROCEDURE — 36415 COLL VENOUS BLD VENIPUNCTURE: CPT

## 2024-11-04 PROCEDURE — 80053 COMPREHEN METABOLIC PANEL: CPT

## 2024-11-04 PROCEDURE — 96361 HYDRATE IV INFUSION ADD-ON: CPT

## 2024-11-04 RX ORDER — NAPROXEN 500 MG/1
500 TABLET ORAL 2 TIMES DAILY WITH MEALS
Qty: 30 TABLET | Refills: 0 | Status: ON HOLD | OUTPATIENT
Start: 2024-11-04

## 2024-11-04 RX ORDER — LIDOCAINE 50 MG/G
1 PATCH TOPICAL DAILY
Qty: 14 PATCH | Refills: 0 | Status: ON HOLD | OUTPATIENT
Start: 2024-11-04

## 2024-11-04 RX ORDER — KETOROLAC TROMETHAMINE 30 MG/ML
15 INJECTION, SOLUTION INTRAMUSCULAR; INTRAVENOUS ONCE
Status: COMPLETED | OUTPATIENT
Start: 2024-11-04 | End: 2024-11-04

## 2024-11-04 RX ORDER — LIDOCAINE 50 MG/G
1 PATCH TOPICAL ONCE
Status: DISCONTINUED | OUTPATIENT
Start: 2024-11-04 | End: 2024-11-04 | Stop reason: HOSPADM

## 2024-11-04 RX ADMIN — LIDOCAINE 1 PATCH: 50 PATCH CUTANEOUS at 20:12

## 2024-11-04 RX ADMIN — KETOROLAC TROMETHAMINE 15 MG: 30 INJECTION, SOLUTION INTRAMUSCULAR at 16:54

## 2024-11-04 RX ADMIN — SODIUM CHLORIDE 1000 ML: 0.9 INJECTION, SOLUTION INTRAVENOUS at 16:56

## 2024-11-05 ENCOUNTER — PATIENT OUTREACH (OUTPATIENT)
Age: 64
End: 2024-11-05

## 2024-11-05 NOTE — PROGRESS NOTES
MRETCM [facility] [place] [facility]      AARTI completed chart review. Per chart, presented to ED for back pain on 11/4/24. Per chart, patient complaining of mid sternal chest pain for he last two days. Per chart, patient discharged.    SWCM will continue to follow up and remain available to assist as needed.

## 2024-11-05 NOTE — DISCHARGE INSTRUCTIONS
You have bruising across your back indicating your pain is likely related to your recent falls. You can take tylenol and naproxen as well as use lidoderm patches to help with the pain. If you develop difficulty breathing, fevers, cough, or other concerns then please return to the ED for evaluation.

## 2024-11-06 ENCOUNTER — TELEPHONE (OUTPATIENT)
Age: 64
End: 2024-11-06

## 2024-11-06 NOTE — TELEPHONE ENCOUNTER
Attempted Contacting Patient regarding recent ED Visit on 11/4/24.     The number listed on file is not accepting calls at this time.          ED:[facility]  CC: Chest Pain; Alcohol intoxication   DX:Back Pain; Contusion   Time: 4:28pm   Last OV: 9/19/24

## 2024-11-07 LAB
ATRIAL RATE: 234 BPM
QRS AXIS: 82 DEGREES
QRSD INTERVAL: 94 MS
QT INTERVAL: 344 MS
QTC INTERVAL: 463 MS
T WAVE AXIS: -13 DEGREES
VENTRICULAR RATE: 109 BPM

## 2024-11-07 PROCEDURE — 93010 ELECTROCARDIOGRAM REPORT: CPT | Performed by: INTERNAL MEDICINE

## 2024-11-14 ENCOUNTER — APPOINTMENT (EMERGENCY)
Dept: RADIOLOGY | Facility: HOSPITAL | Age: 64
DRG: 308 | End: 2024-11-14
Payer: COMMERCIAL

## 2024-11-14 ENCOUNTER — HOSPITAL ENCOUNTER (INPATIENT)
Facility: HOSPITAL | Age: 64
LOS: 11 days | Discharge: HOME | DRG: 308 | End: 2024-11-25
Attending: EMERGENCY MEDICINE | Admitting: INTERNAL MEDICINE
Payer: COMMERCIAL

## 2024-11-14 DIAGNOSIS — F10.929 ALCOHOL INTOXICATION (HCC): ICD-10-CM

## 2024-11-14 DIAGNOSIS — T07.XXXA MULTIPLE ABRASIONS: ICD-10-CM

## 2024-11-14 DIAGNOSIS — F10.90 CHRONIC ALCOHOL USE: ICD-10-CM

## 2024-11-14 DIAGNOSIS — E86.0 DEHYDRATION: ICD-10-CM

## 2024-11-14 DIAGNOSIS — E87.29 ALCOHOLIC KETOACIDOSIS: ICD-10-CM

## 2024-11-14 DIAGNOSIS — S22.39XA RIB FRACTURE: ICD-10-CM

## 2024-11-14 DIAGNOSIS — E87.1 HYPONATREMIA: ICD-10-CM

## 2024-11-14 DIAGNOSIS — R26.2 AMBULATORY DYSFUNCTION: ICD-10-CM

## 2024-11-14 DIAGNOSIS — E83.42 HYPOMAGNESEMIA: ICD-10-CM

## 2024-11-14 DIAGNOSIS — D69.6 THROMBOCYTOPENIA (HCC): ICD-10-CM

## 2024-11-14 DIAGNOSIS — E87.1 CHRONIC HYPONATREMIA: ICD-10-CM
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
## 2024-11-14 DIAGNOSIS — I1A.0 RESISTANT HYPERTENSION: ICD-10-CM

## 2024-11-14 DIAGNOSIS — I48.91 ATRIAL FIBRILLATION WITH RAPID VENTRICULAR RESPONSE (HCC): Primary | ICD-10-CM

## 2024-11-14 DIAGNOSIS — N17.9 AKI (ACUTE KIDNEY INJURY) (HCC): ICD-10-CM

## 2024-11-14 DIAGNOSIS — F10.10 ALCOHOL ABUSE: ICD-10-CM

## 2024-11-14 PROBLEM — R79.89 TROPONIN LEVEL ELEVATED: Status: RESOLVED | Noted: 2024-01-24 | Resolved: 2024-11-14

## 2024-11-14 PROBLEM — S22.32XA CLOSED FRACTURE OF ONE RIB OF LEFT SIDE: Status: ACTIVE | Noted: 2024-11-14

## 2024-11-14 LAB
2HR DELTA HS TROPONIN: -7 NG/L
4HR DELTA HS TROPONIN: -5 NG/L
ALBUMIN SERPL BCG-MCNC: 3 G/DL (ref 3.5–5)
ALBUMIN SERPL BCG-MCNC: 3.1 G/DL (ref 3.5–5)
ALBUMIN SERPL BCG-MCNC: 3.7 G/DL (ref 3.5–5)
ALP SERPL-CCNC: 102 U/L (ref 34–104)
ALP SERPL-CCNC: 133 U/L (ref 34–104)
ALP SERPL-CCNC: 97 U/L (ref 34–104)
ALT SERPL W P-5'-P-CCNC: 33 U/L (ref 7–52)
ALT SERPL W P-5'-P-CCNC: 35 U/L (ref 7–52)
ALT SERPL W P-5'-P-CCNC: 46 U/L (ref 7–52)
ANION GAP SERPL CALCULATED.3IONS-SCNC: 11 MMOL/L (ref 4–13)
ANION GAP SERPL CALCULATED.3IONS-SCNC: 15 MMOL/L (ref 4–13)
ANION GAP SERPL CALCULATED.3IONS-SCNC: 17 MMOL/L (ref 4–13)
ANION GAP SERPL CALCULATED.3IONS-SCNC: 21 MMOL/L (ref 4–13)
ANISOCYTOSIS BLD QL SMEAR: PRESENT
AST SERPL W P-5'-P-CCNC: 57 U/L (ref 13–39)
AST SERPL W P-5'-P-CCNC: 63 U/L (ref 13–39)
AST SERPL W P-5'-P-CCNC: 82 U/L (ref 13–39)
ATRIAL RATE: 286 BPM
ATRIAL RATE: 288 BPM
ATRIAL RATE: 300 BPM
BACTERIA UR QL AUTO: ABNORMAL /HPF
BASE EX.OXY STD BLDV CALC-SCNC: 79.8 % (ref 60–80)
BASE EXCESS BLDV CALC-SCNC: -7.9 MMOL/L
BASOPHILS # BLD AUTO: 0.02 THOUSANDS/ÂΜL (ref 0–0.1)
BASOPHILS # BLD AUTO: 0.03 THOUSANDS/ÂΜL (ref 0–0.1)
BASOPHILS NFR BLD AUTO: 0 % (ref 0–1)
BASOPHILS NFR BLD AUTO: 0 % (ref 0–1)
BILIRUB SERPL-MCNC: 0.54 MG/DL (ref 0.2–1)
BILIRUB SERPL-MCNC: 0.59 MG/DL (ref 0.2–1)
BILIRUB SERPL-MCNC: 0.74 MG/DL (ref 0.2–1)
BILIRUB UR QL STRIP: NEGATIVE
BUN SERPL-MCNC: 25 MG/DL (ref 5–25)
BUN SERPL-MCNC: 32 MG/DL (ref 5–25)
BUN SERPL-MCNC: 35 MG/DL (ref 5–25)
BUN SERPL-MCNC: 37 MG/DL (ref 5–25)
CA-I BLD-SCNC: 0.85 MMOL/L (ref 1.12–1.32)
CA-I BLD-SCNC: 1.03 MMOL/L (ref 1.12–1.32)
CALCIUM ALBUM COR SERPL-MCNC: 7.2 MG/DL (ref 8.3–10.1)
CALCIUM ALBUM COR SERPL-MCNC: 7.9 MG/DL (ref 8.3–10.1)
CALCIUM SERPL-MCNC: 6.4 MG/DL (ref 8.4–10.2)
CALCIUM SERPL-MCNC: 7.2 MG/DL (ref 8.4–10.2)
CALCIUM SERPL-MCNC: 7.4 MG/DL (ref 8.4–10.2)
CALCIUM SERPL-MCNC: 7.6 MG/DL (ref 8.4–10.2)
CARDIAC TROPONIN I PNL SERPL HS: 31 NG/L (ref ?–50)
CARDIAC TROPONIN I PNL SERPL HS: 33 NG/L (ref ?–50)
CARDIAC TROPONIN I PNL SERPL HS: 38 NG/L (ref ?–50)
CHLORIDE SERPL-SCNC: 89 MMOL/L (ref 96–108)
CHLORIDE SERPL-SCNC: 94 MMOL/L (ref 96–108)
CHLORIDE SERPL-SCNC: 96 MMOL/L (ref 96–108)
CHLORIDE SERPL-SCNC: 98 MMOL/L (ref 96–108)
CLARITY UR: CLEAR
CO2 SERPL-SCNC: 17 MMOL/L (ref 21–32)
CO2 SERPL-SCNC: 20 MMOL/L (ref 21–32)
CO2 SERPL-SCNC: 21 MMOL/L (ref 21–32)
CO2 SERPL-SCNC: 22 MMOL/L (ref 21–32)
COLOR UR: YELLOW
CREAT SERPL-MCNC: 1.08 MG/DL (ref 0.6–1.3)
CREAT SERPL-MCNC: 1.31 MG/DL (ref 0.6–1.3)
CREAT SERPL-MCNC: 1.44 MG/DL (ref 0.6–1.3)
CREAT SERPL-MCNC: 1.6 MG/DL (ref 0.6–1.3)
EOSINOPHIL # BLD AUTO: 0.02 THOUSAND/ÂΜL (ref 0–0.61)
EOSINOPHIL # BLD AUTO: 0.03 THOUSAND/ÂΜL (ref 0–0.61)
EOSINOPHIL NFR BLD AUTO: 0 % (ref 0–6)
EOSINOPHIL NFR BLD AUTO: 1 % (ref 0–6)
ERYTHROCYTE [DISTWIDTH] IN BLOOD BY AUTOMATED COUNT: 15.7 % (ref 11.6–15.1)
ERYTHROCYTE [DISTWIDTH] IN BLOOD BY AUTOMATED COUNT: 15.9 % (ref 11.6–15.1)
EST. AVERAGE GLUCOSE BLD GHB EST-MCNC: 103 MG/DL
ETHANOL SERPL-MCNC: 381 MG/DL
FLUAV AG UPPER RESP QL IA.RAPID: NEGATIVE
FLUBV AG UPPER RESP QL IA.RAPID: NEGATIVE
GFR SERPL CREATININE-BSD FRML MDRD: 45 ML/MIN/1.73SQ M
GFR SERPL CREATININE-BSD FRML MDRD: 51 ML/MIN/1.73SQ M
GFR SERPL CREATININE-BSD FRML MDRD: 57 ML/MIN/1.73SQ M
GFR SERPL CREATININE-BSD FRML MDRD: 73 ML/MIN/1.73SQ M
GLUCOSE SERPL-MCNC: 123 MG/DL (ref 65–140)
GLUCOSE SERPL-MCNC: 139 MG/DL (ref 65–140)
GLUCOSE SERPL-MCNC: 150 MG/DL (ref 65–140)
GLUCOSE SERPL-MCNC: 170 MG/DL (ref 65–140)
GLUCOSE SERPL-MCNC: 197 MG/DL (ref 65–140)
GLUCOSE SERPL-MCNC: 350 MG/DL (ref 65–140)
GLUCOSE SERPL-MCNC: 71 MG/DL (ref 65–140)
GLUCOSE SERPL-MCNC: 73 MG/DL (ref 65–140)
GLUCOSE SERPL-MCNC: 82 MG/DL (ref 65–140)
GLUCOSE SERPL-MCNC: 87 MG/DL (ref 65–140)
GLUCOSE SERPL-MCNC: 89 MG/DL (ref 65–140)
GLUCOSE UR STRIP-MCNC: NEGATIVE MG/DL
HBA1C MFR BLD: 5.2 %
HCO3 BLDV-SCNC: 18 MMOL/L (ref 24–30)
HCT VFR BLD AUTO: 31.6 % (ref 36.5–49.3)
HCT VFR BLD AUTO: 36.6 % (ref 36.5–49.3)
HGB BLD-MCNC: 10.6 G/DL (ref 12–17)
HGB BLD-MCNC: 12.6 G/DL (ref 12–17)
HGB UR QL STRIP.AUTO: NEGATIVE
IMM GRANULOCYTES # BLD AUTO: 0.06 THOUSAND/UL (ref 0–0.2)
IMM GRANULOCYTES # BLD AUTO: 0.09 THOUSAND/UL (ref 0–0.2)
IMM GRANULOCYTES NFR BLD AUTO: 1 % (ref 0–2)
IMM GRANULOCYTES NFR BLD AUTO: 1 % (ref 0–2)
INR PPP: 1.13 (ref 0.85–1.19)
KETONES UR STRIP-MCNC: ABNORMAL MG/DL
LACTATE SERPL-SCNC: 0.8 MMOL/L (ref 0.5–2)
LEUKOCYTE ESTERASE UR QL STRIP: NEGATIVE
LYMPHOCYTES # BLD AUTO: 0.68 THOUSANDS/ÂΜL (ref 0.6–4.47)
LYMPHOCYTES # BLD AUTO: 0.8 THOUSANDS/ÂΜL (ref 0.6–4.47)
LYMPHOCYTES NFR BLD AUTO: 10 % (ref 14–44)
LYMPHOCYTES NFR BLD AUTO: 13 % (ref 14–44)
MAGNESIUM SERPL-MCNC: 1.1 MG/DL (ref 1.9–2.7)
MAGNESIUM SERPL-MCNC: 1.6 MG/DL (ref 1.9–2.7)
MAGNESIUM SERPL-MCNC: 2 MG/DL (ref 1.9–2.7)
MCH RBC QN AUTO: 32.6 PG (ref 26.8–34.3)
MCH RBC QN AUTO: 33.2 PG (ref 26.8–34.3)
MCHC RBC AUTO-ENTMCNC: 33.5 G/DL (ref 31.4–37.4)
MCHC RBC AUTO-ENTMCNC: 34.4 G/DL (ref 31.4–37.4)
MCV RBC AUTO: 97 FL (ref 82–98)
MCV RBC AUTO: 97 FL (ref 82–98)
MONOCYTES # BLD AUTO: 0.75 THOUSAND/ÂΜL (ref 0.17–1.22)
MONOCYTES # BLD AUTO: 1.23 THOUSAND/ÂΜL (ref 0.17–1.22)
MONOCYTES NFR BLD AUTO: 11 % (ref 4–12)
MONOCYTES NFR BLD AUTO: 20 % (ref 4–12)
NEUTROPHILS # BLD AUTO: 4.13 THOUSANDS/ÂΜL (ref 1.85–7.62)
NEUTROPHILS # BLD AUTO: 5.46 THOUSANDS/ÂΜL (ref 1.85–7.62)
NEUTS SEG NFR BLD AUTO: 65 % (ref 43–75)
NEUTS SEG NFR BLD AUTO: 78 % (ref 43–75)
NITRITE UR QL STRIP: NEGATIVE
NON-SQ EPI CELLS URNS QL MICRO: ABNORMAL /HPF
NRBC BLD AUTO-RTO: 0 /100 WBCS
NRBC BLD AUTO-RTO: 0 /100 WBCS
O2 CT BLDV-SCNC: 13.5 ML/DL
P AXIS: 240 DEGREES
P AXIS: 270 DEGREES
PCO2 BLDV: 38.3 MM HG (ref 42–50)
PH BLDV: 7.29 [PH] (ref 7.3–7.4)
PH UR STRIP.AUTO: 5.5 [PH]
PLATELET # BLD AUTO: 74 THOUSANDS/UL (ref 149–390)
PLATELET # BLD AUTO: 96 THOUSANDS/UL (ref 149–390)
PLATELET BLD QL SMEAR: ABNORMAL
PMV BLD AUTO: 10.7 FL (ref 8.9–12.7)
PMV BLD AUTO: 11.1 FL (ref 8.9–12.7)
PO2 BLDV: 56 MM HG (ref 35–45)
POTASSIUM SERPL-SCNC: 3.5 MMOL/L (ref 3.5–5.3)
POTASSIUM SERPL-SCNC: 3.6 MMOL/L (ref 3.5–5.3)
POTASSIUM SERPL-SCNC: 3.7 MMOL/L (ref 3.5–5.3)
POTASSIUM SERPL-SCNC: 4.4 MMOL/L (ref 3.5–5.3)
PROT SERPL-MCNC: 5.5 G/DL (ref 6.4–8.4)
PROT SERPL-MCNC: 5.8 G/DL (ref 6.4–8.4)
PROT SERPL-MCNC: 6.9 G/DL (ref 6.4–8.4)
PROT UR STRIP-MCNC: ABNORMAL MG/DL
PROTHROMBIN TIME: 15.1 SECONDS (ref 12.3–15)
QRS AXIS: 105 DEGREES
QRS AXIS: 127 DEGREES
QRS AXIS: 77 DEGREES
QRSD INTERVAL: 80 MS
QRSD INTERVAL: 92 MS
QRSD INTERVAL: 98 MS
QT INTERVAL: 288 MS
QT INTERVAL: 318 MS
QT INTERVAL: 372 MS
QTC INTERVAL: 417 MS
QTC INTERVAL: 434 MS
QTC INTERVAL: 474 MS
RBC # BLD AUTO: 3.25 MILLION/UL (ref 3.88–5.62)
RBC # BLD AUTO: 3.79 MILLION/UL (ref 3.88–5.62)
RBC #/AREA URNS AUTO: ABNORMAL /HPF
RBC MORPH BLD: PRESENT
SARS-COV+SARS-COV-2 AG RESP QL IA.RAPID: NEGATIVE
SODIUM SERPL-SCNC: 127 MMOL/L (ref 135–147)
SODIUM SERPL-SCNC: 130 MMOL/L (ref 135–147)
SODIUM SERPL-SCNC: 130 MMOL/L (ref 135–147)
SODIUM SERPL-SCNC: 134 MMOL/L (ref 135–147)
SP GR UR STRIP.AUTO: 1.02 (ref 1–1.03)
T WAVE AXIS: -22 DEGREES
T WAVE AXIS: -25 DEGREES
T WAVE AXIS: -9 DEGREES
TSH SERPL DL<=0.05 MIU/L-ACNC: 0.51 UIU/ML (ref 0.45–4.5)
UROBILINOGEN UR STRIP-ACNC: <2 MG/DL
VENTRICULAR RATE: 112 BPM
VENTRICULAR RATE: 126 BPM
VENTRICULAR RATE: 98 BPM
WBC # BLD AUTO: 6.3 THOUSAND/UL (ref 4.31–10.16)
WBC # BLD AUTO: 7 THOUSAND/UL (ref 4.31–10.16)
WBC #/AREA URNS AUTO: ABNORMAL /HPF

## 2024-11-14 PROCEDURE — 96367 TX/PROPH/DG ADDL SEQ IV INF: CPT

## 2024-11-14 PROCEDURE — 71250 CT THORAX DX C-: CPT

## 2024-11-14 PROCEDURE — 96375 TX/PRO/DX INJ NEW DRUG ADDON: CPT

## 2024-11-14 PROCEDURE — 85610 PROTHROMBIN TIME: CPT | Performed by: EMERGENCY MEDICINE

## 2024-11-14 PROCEDURE — 80053 COMPREHEN METABOLIC PANEL: CPT

## 2024-11-14 PROCEDURE — 83605 ASSAY OF LACTIC ACID: CPT | Performed by: EMERGENCY MEDICINE

## 2024-11-14 PROCEDURE — 99291 CRITICAL CARE FIRST HOUR: CPT | Performed by: EMERGENCY MEDICINE

## 2024-11-14 PROCEDURE — 93005 ELECTROCARDIOGRAM TRACING: CPT

## 2024-11-14 PROCEDURE — 83036 HEMOGLOBIN GLYCOSYLATED A1C: CPT

## 2024-11-14 PROCEDURE — 83735 ASSAY OF MAGNESIUM: CPT | Performed by: EMERGENCY MEDICINE

## 2024-11-14 PROCEDURE — 96365 THER/PROPH/DIAG IV INF INIT: CPT

## 2024-11-14 PROCEDURE — 82948 REAGENT STRIP/BLOOD GLUCOSE: CPT

## 2024-11-14 PROCEDURE — 80048 BASIC METABOLIC PNL TOTAL CA: CPT | Performed by: NURSE PRACTITIONER

## 2024-11-14 PROCEDURE — 83735 ASSAY OF MAGNESIUM: CPT | Performed by: NURSE PRACTITIONER

## 2024-11-14 PROCEDURE — 87811 SARS-COV-2 COVID19 W/OPTIC: CPT | Performed by: EMERGENCY MEDICINE

## 2024-11-14 PROCEDURE — 70450 CT HEAD/BRAIN W/O DYE: CPT

## 2024-11-14 PROCEDURE — 96366 THER/PROPH/DIAG IV INF ADDON: CPT

## 2024-11-14 PROCEDURE — 82330 ASSAY OF CALCIUM: CPT | Performed by: EMERGENCY MEDICINE

## 2024-11-14 PROCEDURE — 96361 HYDRATE IV INFUSION ADD-ON: CPT

## 2024-11-14 PROCEDURE — 72125 CT NECK SPINE W/O DYE: CPT

## 2024-11-14 PROCEDURE — 84443 ASSAY THYROID STIM HORMONE: CPT | Performed by: NURSE PRACTITIONER

## 2024-11-14 PROCEDURE — 99292 CRITICAL CARE ADDL 30 MIN: CPT | Performed by: EMERGENCY MEDICINE

## 2024-11-14 PROCEDURE — 87804 INFLUENZA ASSAY W/OPTIC: CPT | Performed by: EMERGENCY MEDICINE

## 2024-11-14 PROCEDURE — 74176 CT ABD & PELVIS W/O CONTRAST: CPT

## 2024-11-14 PROCEDURE — 81001 URINALYSIS AUTO W/SCOPE: CPT

## 2024-11-14 PROCEDURE — 85025 COMPLETE CBC W/AUTO DIFF WBC: CPT | Performed by: EMERGENCY MEDICINE

## 2024-11-14 PROCEDURE — 80053 COMPREHEN METABOLIC PANEL: CPT | Performed by: EMERGENCY MEDICINE

## 2024-11-14 PROCEDURE — 99285 EMERGENCY DEPT VISIT HI MDM: CPT

## 2024-11-14 PROCEDURE — 82077 ASSAY SPEC XCP UR&BREATH IA: CPT | Performed by: EMERGENCY MEDICINE

## 2024-11-14 PROCEDURE — 36415 COLL VENOUS BLD VENIPUNCTURE: CPT | Performed by: EMERGENCY MEDICINE

## 2024-11-14 PROCEDURE — 82805 BLOOD GASES W/O2 SATURATION: CPT | Performed by: EMERGENCY MEDICINE

## 2024-11-14 PROCEDURE — 83735 ASSAY OF MAGNESIUM: CPT

## 2024-11-14 PROCEDURE — 82330 ASSAY OF CALCIUM: CPT

## 2024-11-14 PROCEDURE — 87081 CULTURE SCREEN ONLY: CPT | Performed by: FAMILY MEDICINE

## 2024-11-14 PROCEDURE — 85025 COMPLETE CBC W/AUTO DIFF WBC: CPT

## 2024-11-14 PROCEDURE — 93010 ELECTROCARDIOGRAM REPORT: CPT | Performed by: INTERNAL MEDICINE

## 2024-11-14 PROCEDURE — 99291 CRITICAL CARE FIRST HOUR: CPT | Performed by: ANESTHESIOLOGY

## 2024-11-14 PROCEDURE — 84484 ASSAY OF TROPONIN QUANT: CPT | Performed by: EMERGENCY MEDICINE

## 2024-11-14 RX ORDER — GABAPENTIN 300 MG/1
300 CAPSULE ORAL DAILY
Status: DISCONTINUED | OUTPATIENT
Start: 2024-11-14 | End: 2024-11-14

## 2024-11-14 RX ORDER — MAGNESIUM SULFATE HEPTAHYDRATE 40 MG/ML
2 INJECTION, SOLUTION INTRAVENOUS ONCE
Status: COMPLETED | OUTPATIENT
Start: 2024-11-14 | End: 2024-11-14

## 2024-11-14 RX ORDER — CALCIUM GLUCONATE 20 MG/ML
1 INJECTION, SOLUTION INTRAVENOUS ONCE
Status: COMPLETED | OUTPATIENT
Start: 2024-11-14 | End: 2024-11-14

## 2024-11-14 RX ORDER — MIDAZOLAM HYDROCHLORIDE 2 MG/2ML
INJECTION, SOLUTION INTRAMUSCULAR; INTRAVENOUS
Status: COMPLETED
Start: 2024-11-14 | End: 2024-11-14

## 2024-11-14 RX ORDER — FOLIC ACID 1 MG/1
1000 TABLET ORAL DAILY
Status: DISCONTINUED | OUTPATIENT
Start: 2024-11-14 | End: 2024-11-14

## 2024-11-14 RX ORDER — AMOXICILLIN 250 MG
1 CAPSULE ORAL DAILY PRN
Status: DISCONTINUED | OUTPATIENT
Start: 2024-11-14 | End: 2024-11-25 | Stop reason: HOSPADM

## 2024-11-14 RX ORDER — METOPROLOL TARTRATE 50 MG
50 TABLET ORAL EVERY 12 HOURS SCHEDULED
Status: DISCONTINUED | OUTPATIENT
Start: 2024-11-14 | End: 2024-11-16

## 2024-11-14 RX ORDER — FERROUS SULFATE 325(65) MG
325 TABLET ORAL
Status: DISCONTINUED | OUTPATIENT
Start: 2024-11-14 | End: 2024-11-14

## 2024-11-14 RX ORDER — PANTOPRAZOLE SODIUM 40 MG/1
40 TABLET, DELAYED RELEASE ORAL
Status: DISCONTINUED | OUTPATIENT
Start: 2024-11-15 | End: 2024-11-25 | Stop reason: HOSPADM

## 2024-11-14 RX ORDER — ALBUMIN HUMAN 50 G/1000ML
12.5 SOLUTION INTRAVENOUS ONCE
Status: COMPLETED | OUTPATIENT
Start: 2024-11-14 | End: 2024-11-14

## 2024-11-14 RX ORDER — LANOLIN ALCOHOL/MO/W.PET/CERES
100 CREAM (GRAM) TOPICAL DAILY
Status: DISCONTINUED | OUTPATIENT
Start: 2024-11-15 | End: 2024-11-17

## 2024-11-14 RX ORDER — METOPROLOL TARTRATE 1 MG/ML
5 INJECTION, SOLUTION INTRAVENOUS ONCE AS NEEDED
Status: COMPLETED | OUTPATIENT
Start: 2024-11-14 | End: 2024-11-14

## 2024-11-14 RX ORDER — FOLIC ACID 1 MG/1
1000 TABLET ORAL DAILY
Status: DISCONTINUED | OUTPATIENT
Start: 2024-11-15 | End: 2024-11-17

## 2024-11-14 RX ORDER — GABAPENTIN 300 MG/1
300 CAPSULE ORAL DAILY
Status: DISCONTINUED | OUTPATIENT
Start: 2024-11-15 | End: 2024-11-14

## 2024-11-14 RX ORDER — NICOTINE 21 MG/24HR
1 PATCH, TRANSDERMAL 24 HOURS TRANSDERMAL DAILY
Status: DISCONTINUED | OUTPATIENT
Start: 2024-11-15 | End: 2024-11-25 | Stop reason: HOSPADM

## 2024-11-14 RX ORDER — PHENOBARBITAL SODIUM 65 MG/ML
130 INJECTION, SOLUTION INTRAMUSCULAR; INTRAVENOUS ONCE
Status: COMPLETED | OUTPATIENT
Start: 2024-11-14 | End: 2024-11-14

## 2024-11-14 RX ORDER — DILTIAZEM HYDROCHLORIDE 5 MG/ML
20 INJECTION INTRAVENOUS ONCE
Status: COMPLETED | OUTPATIENT
Start: 2024-11-14 | End: 2024-11-15

## 2024-11-14 RX ORDER — ACETAMINOPHEN 325 MG/1
650 TABLET ORAL EVERY 6 HOURS PRN
Status: DISCONTINUED | OUTPATIENT
Start: 2024-11-14 | End: 2024-11-25 | Stop reason: HOSPADM

## 2024-11-14 RX ORDER — DILTIAZEM HYDROCHLORIDE 5 MG/ML
INJECTION INTRAVENOUS
Status: DISCONTINUED
Start: 2024-11-14 | End: 2024-11-14 | Stop reason: WASHOUT

## 2024-11-14 RX ORDER — FERROUS SULFATE 325(65) MG
325 TABLET ORAL
Status: DISCONTINUED | OUTPATIENT
Start: 2024-11-15 | End: 2024-11-25 | Stop reason: HOSPADM

## 2024-11-14 RX ORDER — SODIUM CHLORIDE, SODIUM LACTATE, POTASSIUM CHLORIDE, CALCIUM CHLORIDE 600; 310; 30; 20 MG/100ML; MG/100ML; MG/100ML; MG/100ML
125 INJECTION, SOLUTION INTRAVENOUS CONTINUOUS
Status: DISCONTINUED | OUTPATIENT
Start: 2024-11-14 | End: 2024-11-14

## 2024-11-14 RX ORDER — PREDNISONE 20 MG/1
40 TABLET ORAL DAILY
Status: DISCONTINUED | OUTPATIENT
Start: 2024-11-15 | End: 2024-11-15

## 2024-11-14 RX ORDER — OLANZAPINE 10 MG/2ML
10 INJECTION, POWDER, FOR SOLUTION INTRAMUSCULAR ONCE
Status: DISCONTINUED | OUTPATIENT
Start: 2024-11-14 | End: 2024-11-14

## 2024-11-14 RX ORDER — NICOTINE 21 MG/24HR
1 PATCH, TRANSDERMAL 24 HOURS TRANSDERMAL DAILY
Status: DISCONTINUED | OUTPATIENT
Start: 2024-11-14 | End: 2024-11-14

## 2024-11-14 RX ORDER — GABAPENTIN 300 MG/1
300 CAPSULE ORAL 3 TIMES DAILY
Status: DISCONTINUED | OUTPATIENT
Start: 2024-11-14 | End: 2024-11-17

## 2024-11-14 RX ORDER — FLUTICASONE FUROATE AND VILANTEROL 200; 25 UG/1; UG/1
1 POWDER RESPIRATORY (INHALATION) DAILY
Status: DISCONTINUED | OUTPATIENT
Start: 2024-11-14 | End: 2024-11-14

## 2024-11-14 RX ORDER — METOPROLOL TARTRATE 1 MG/ML
5 INJECTION, SOLUTION INTRAVENOUS ONCE
Status: COMPLETED | OUTPATIENT
Start: 2024-11-14 | End: 2024-11-14

## 2024-11-14 RX ORDER — DEXTROSE, SODIUM CHLORIDE, SODIUM LACTATE, POTASSIUM CHLORIDE, AND CALCIUM CHLORIDE 5; .6; .31; .03; .02 G/100ML; G/100ML; G/100ML; G/100ML; G/100ML
125 INJECTION, SOLUTION INTRAVENOUS CONTINUOUS
Status: DISCONTINUED | OUTPATIENT
Start: 2024-11-14 | End: 2024-11-14

## 2024-11-14 RX ORDER — IPRATROPIUM BROMIDE AND ALBUTEROL SULFATE 2.5; .5 MG/3ML; MG/3ML
3 SOLUTION RESPIRATORY (INHALATION) ONCE
Status: COMPLETED | OUTPATIENT
Start: 2024-11-14 | End: 2024-11-14

## 2024-11-14 RX ORDER — MIDAZOLAM HYDROCHLORIDE 2 MG/2ML
2 INJECTION, SOLUTION INTRAMUSCULAR; INTRAVENOUS ONCE
Status: COMPLETED | OUTPATIENT
Start: 2024-11-14 | End: 2024-11-14

## 2024-11-14 RX ORDER — DILTIAZEM HYDROCHLORIDE 5 MG/ML
15 INJECTION INTRAVENOUS ONCE
Status: DISCONTINUED | OUTPATIENT
Start: 2024-11-14 | End: 2024-11-14

## 2024-11-14 RX ORDER — PREDNISONE 20 MG/1
40 TABLET ORAL DAILY
Status: DISCONTINUED | OUTPATIENT
Start: 2024-11-14 | End: 2024-11-14

## 2024-11-14 RX ORDER — PANTOPRAZOLE SODIUM 40 MG/1
40 TABLET, DELAYED RELEASE ORAL
Status: DISCONTINUED | OUTPATIENT
Start: 2024-11-14 | End: 2024-11-14

## 2024-11-14 RX ORDER — AMOXICILLIN 250 MG
1 CAPSULE ORAL DAILY PRN
Status: DISCONTINUED | OUTPATIENT
Start: 2024-11-14 | End: 2024-11-14

## 2024-11-14 RX ORDER — LEVALBUTEROL INHALATION SOLUTION 1.25 MG/3ML
1.25 SOLUTION RESPIRATORY (INHALATION) EVERY 8 HOURS PRN
Status: DISCONTINUED | OUTPATIENT
Start: 2024-11-14 | End: 2024-11-18

## 2024-11-14 RX ORDER — LEVALBUTEROL INHALATION SOLUTION 1.25 MG/3ML
1.25 SOLUTION RESPIRATORY (INHALATION) EVERY 8 HOURS PRN
Status: DISCONTINUED | OUTPATIENT
Start: 2024-11-14 | End: 2024-11-14

## 2024-11-14 RX ORDER — INSULIN LISPRO 100 [IU]/ML
1-6 INJECTION, SOLUTION INTRAVENOUS; SUBCUTANEOUS
Status: DISCONTINUED | OUTPATIENT
Start: 2024-11-14 | End: 2024-11-14

## 2024-11-14 RX ORDER — MAGNESIUM SULFATE HEPTAHYDRATE 40 MG/ML
4 INJECTION, SOLUTION INTRAVENOUS ONCE
Status: DISCONTINUED | OUTPATIENT
Start: 2024-11-14 | End: 2024-11-14

## 2024-11-14 RX ORDER — TAMSULOSIN HYDROCHLORIDE 0.4 MG/1
0.4 CAPSULE ORAL DAILY
Status: DISCONTINUED | OUTPATIENT
Start: 2024-11-15 | End: 2024-11-25 | Stop reason: HOSPADM

## 2024-11-14 RX ORDER — SODIUM CHLORIDE, SODIUM GLUCONATE, SODIUM ACETATE, POTASSIUM CHLORIDE, MAGNESIUM CHLORIDE, SODIUM PHOSPHATE, DIBASIC, AND POTASSIUM PHOSPHATE .53; .5; .37; .037; .03; .012; .00082 G/100ML; G/100ML; G/100ML; G/100ML; G/100ML; G/100ML; G/100ML
125 INJECTION, SOLUTION INTRAVENOUS CONTINUOUS
Status: DISCONTINUED | OUTPATIENT
Start: 2024-11-14 | End: 2024-11-14

## 2024-11-14 RX ORDER — POTASSIUM CHLORIDE 14.9 MG/ML
20 INJECTION INTRAVENOUS
Status: COMPLETED | OUTPATIENT
Start: 2024-11-14 | End: 2024-11-14

## 2024-11-14 RX ORDER — METOPROLOL TARTRATE 25 MG/1
25 TABLET, FILM COATED ORAL EVERY 12 HOURS SCHEDULED
Status: DISCONTINUED | OUTPATIENT
Start: 2024-11-14 | End: 2024-11-14

## 2024-11-14 RX ORDER — TAMSULOSIN HYDROCHLORIDE 0.4 MG/1
0.4 CAPSULE ORAL DAILY
Status: DISCONTINUED | OUTPATIENT
Start: 2024-11-14 | End: 2024-11-14

## 2024-11-14 RX ORDER — INSULIN LISPRO 100 [IU]/ML
5 INJECTION, SOLUTION INTRAVENOUS; SUBCUTANEOUS ONCE
Status: COMPLETED | OUTPATIENT
Start: 2024-11-14 | End: 2024-11-14

## 2024-11-14 RX ORDER — LANOLIN ALCOHOL/MO/W.PET/CERES
100 CREAM (GRAM) TOPICAL DAILY
Status: DISCONTINUED | OUTPATIENT
Start: 2024-11-14 | End: 2024-11-14

## 2024-11-14 RX ORDER — INSULIN LISPRO 100 [IU]/ML
1-6 INJECTION, SOLUTION INTRAVENOUS; SUBCUTANEOUS
Status: DISCONTINUED | OUTPATIENT
Start: 2024-11-14 | End: 2024-11-19

## 2024-11-14 RX ORDER — SODIUM CHLORIDE, SODIUM GLUCONATE, SODIUM ACETATE, POTASSIUM CHLORIDE, MAGNESIUM CHLORIDE, SODIUM PHOSPHATE, DIBASIC, AND POTASSIUM PHOSPHATE .53; .5; .37; .037; .03; .012; .00082 G/100ML; G/100ML; G/100ML; G/100ML; G/100ML; G/100ML; G/100ML
1000 INJECTION, SOLUTION INTRAVENOUS ONCE
Status: COMPLETED | OUTPATIENT
Start: 2024-11-14 | End: 2024-11-14

## 2024-11-14 RX ORDER — SODIUM CHLORIDE, SODIUM LACTATE, POTASSIUM CHLORIDE, CALCIUM CHLORIDE 600; 310; 30; 20 MG/100ML; MG/100ML; MG/100ML; MG/100ML
50 INJECTION, SOLUTION INTRAVENOUS CONTINUOUS
Status: DISCONTINUED | OUTPATIENT
Start: 2024-11-14 | End: 2024-11-15

## 2024-11-14 RX ORDER — FLUTICASONE FUROATE AND VILANTEROL 200; 25 UG/1; UG/1
1 POWDER RESPIRATORY (INHALATION) DAILY
Status: DISCONTINUED | OUTPATIENT
Start: 2024-11-15 | End: 2024-11-25 | Stop reason: HOSPADM

## 2024-11-14 RX ORDER — RANOLAZINE 500 MG/1
500 TABLET, EXTENDED RELEASE ORAL EVERY 12 HOURS
Status: DISCONTINUED | OUTPATIENT
Start: 2024-11-14 | End: 2024-11-14

## 2024-11-14 RX ORDER — CALCIUM GLUCONATE 20 MG/ML
2 INJECTION, SOLUTION INTRAVENOUS ONCE
Status: COMPLETED | OUTPATIENT
Start: 2024-11-14 | End: 2024-11-14

## 2024-11-14 RX ORDER — POTASSIUM CHLORIDE 1500 MG/1
40 TABLET, EXTENDED RELEASE ORAL ONCE
Status: COMPLETED | OUTPATIENT
Start: 2024-11-14 | End: 2024-11-14

## 2024-11-14 RX ORDER — SODIUM CHLORIDE, SODIUM GLUCONATE, SODIUM ACETATE, POTASSIUM CHLORIDE, MAGNESIUM CHLORIDE, SODIUM PHOSPHATE, DIBASIC, AND POTASSIUM PHOSPHATE .53; .5; .37; .037; .03; .012; .00082 G/100ML; G/100ML; G/100ML; G/100ML; G/100ML; G/100ML; G/100ML
100 INJECTION, SOLUTION INTRAVENOUS CONTINUOUS
Status: DISCONTINUED | OUTPATIENT
Start: 2024-11-14 | End: 2024-11-14

## 2024-11-14 RX ORDER — ACETAMINOPHEN 325 MG/1
650 TABLET ORAL EVERY 6 HOURS PRN
Status: DISCONTINUED | OUTPATIENT
Start: 2024-11-14 | End: 2024-11-14

## 2024-11-14 RX ADMIN — PANTOPRAZOLE SODIUM 40 MG: 40 TABLET, DELAYED RELEASE ORAL at 07:16

## 2024-11-14 RX ADMIN — PANTOPRAZOLE SODIUM 40 MG: 40 TABLET, DELAYED RELEASE ORAL at 15:58

## 2024-11-14 RX ADMIN — MAGNESIUM SULFATE HEPTAHYDRATE 2 G: 40 INJECTION, SOLUTION INTRAVENOUS at 09:35

## 2024-11-14 RX ADMIN — MIDAZOLAM 2 MG: 1 INJECTION INTRAMUSCULAR; INTRAVENOUS at 03:05

## 2024-11-14 RX ADMIN — FLUTICASONE FUROATE AND VILANTEROL TRIFENATATE 1 PUFF: 200; 25 POWDER RESPIRATORY (INHALATION) at 11:11

## 2024-11-14 RX ADMIN — CALCIUM GLUCONATE 1 G: 20 INJECTION, SOLUTION INTRAVENOUS at 20:11

## 2024-11-14 RX ADMIN — FOLIC ACID 1000 MCG: 1 TABLET ORAL at 11:11

## 2024-11-14 RX ADMIN — CALCIUM GLUCONATE 1 G: 20 INJECTION, SOLUTION INTRAVENOUS at 06:23

## 2024-11-14 RX ADMIN — MAGNESIUM SULFATE HEPTAHYDRATE 2 G: 40 INJECTION, SOLUTION INTRAVENOUS at 05:13

## 2024-11-14 RX ADMIN — PREDNISONE 40 MG: 20 TABLET ORAL at 11:11

## 2024-11-14 RX ADMIN — DEXTROSE, SODIUM CHLORIDE, SODIUM LACTATE, POTASSIUM CHLORIDE, AND CALCIUM CHLORIDE 125 ML/HR: 5; .6; .31; .03; .02 INJECTION, SOLUTION INTRAVENOUS at 07:00

## 2024-11-14 RX ADMIN — GABAPENTIN 300 MG: 300 CAPSULE ORAL at 11:11

## 2024-11-14 RX ADMIN — METOPROLOL TARTRATE 5 MG: 5 INJECTION INTRAVENOUS at 05:05

## 2024-11-14 RX ADMIN — METOPROLOL TARTRATE 5 MG: 5 INJECTION INTRAVENOUS at 02:12

## 2024-11-14 RX ADMIN — PHENOBARBITAL SODIUM 130 MG: 65 INJECTION INTRAMUSCULAR; INTRAVENOUS at 13:42

## 2024-11-14 RX ADMIN — DILTIAZEM HYDROCHLORIDE 5 MG/HR: 5 INJECTION, SOLUTION INTRAVENOUS at 04:02

## 2024-11-14 RX ADMIN — INSULIN LISPRO 5 UNITS: 100 INJECTION, SOLUTION INTRAVENOUS; SUBCUTANEOUS at 19:28

## 2024-11-14 RX ADMIN — SODIUM CHLORIDE, SODIUM LACTATE, POTASSIUM CHLORIDE, AND CALCIUM CHLORIDE 125 ML/HR: .6; .31; .03; .02 INJECTION, SOLUTION INTRAVENOUS at 04:43

## 2024-11-14 RX ADMIN — POTASSIUM CHLORIDE 20 MEQ: 14.9 INJECTION, SOLUTION INTRAVENOUS at 07:17

## 2024-11-14 RX ADMIN — DILTIAZEM HYDROCHLORIDE 15 MG/HR: 5 INJECTION, SOLUTION INTRAVENOUS at 06:22

## 2024-11-14 RX ADMIN — POTASSIUM CHLORIDE 40 MEQ: 1500 TABLET, EXTENDED RELEASE ORAL at 11:11

## 2024-11-14 RX ADMIN — THIAMINE HCL TAB 100 MG 100 MG: 100 TAB at 11:11

## 2024-11-14 RX ADMIN — APIXABAN 5 MG: 5 TABLET, FILM COATED ORAL at 11:11

## 2024-11-14 RX ADMIN — ALBUMIN (HUMAN) 12.5 G: 12.5 INJECTION, SOLUTION INTRAVENOUS at 06:07

## 2024-11-14 RX ADMIN — METOPROLOL TARTRATE 5 MG: 5 INJECTION INTRAVENOUS at 01:43

## 2024-11-14 RX ADMIN — SODIUM CHLORIDE, SODIUM GLUCONATE, SODIUM ACETATE, POTASSIUM CHLORIDE, MAGNESIUM CHLORIDE, SODIUM PHOSPHATE, DIBASIC, AND POTASSIUM PHOSPHATE 1000 ML: .53; .5; .37; .037; .03; .012; .00082 INJECTION, SOLUTION INTRAVENOUS at 02:16

## 2024-11-14 RX ADMIN — GABAPENTIN 300 MG: 300 CAPSULE ORAL at 21:39

## 2024-11-14 RX ADMIN — MIDAZOLAM 2 MG: 1 INJECTION INTRAMUSCULAR; INTRAVENOUS at 04:45

## 2024-11-14 RX ADMIN — METOPROLOL TARTRATE 5 MG: 5 INJECTION INTRAVENOUS at 21:39

## 2024-11-14 RX ADMIN — TAMSULOSIN HYDROCHLORIDE 0.4 MG: 0.4 CAPSULE ORAL at 11:11

## 2024-11-14 RX ADMIN — MAGNESIUM SULFATE HEPTAHYDRATE 2 G: 40 INJECTION, SOLUTION INTRAVENOUS at 07:30

## 2024-11-14 RX ADMIN — MAGNESIUM SULFATE HEPTAHYDRATE 2 G: 40 INJECTION, SOLUTION INTRAVENOUS at 19:28

## 2024-11-14 RX ADMIN — THIAMINE HYDROCHLORIDE 100 MG: 100 INJECTION, SOLUTION INTRAMUSCULAR; INTRAVENOUS at 01:16

## 2024-11-14 RX ADMIN — CALCIUM GLUCONATE 1 G: 20 INJECTION, SOLUTION INTRAVENOUS at 04:32

## 2024-11-14 RX ADMIN — MAGNESIUM SULFATE HEPTAHYDRATE 2 G: 40 INJECTION, SOLUTION INTRAVENOUS at 06:03

## 2024-11-14 RX ADMIN — FERROUS SULFATE TAB 325 MG (65 MG ELEMENTAL FE) 325 MG: 325 (65 FE) TAB at 07:16

## 2024-11-14 RX ADMIN — SODIUM CHLORIDE 1000 ML: 0.9 INJECTION, SOLUTION INTRAVENOUS at 00:35

## 2024-11-14 RX ADMIN — MIDAZOLAM HYDROCHLORIDE 2 MG: 2 INJECTION, SOLUTION INTRAMUSCULAR; INTRAVENOUS at 05:06

## 2024-11-14 RX ADMIN — POTASSIUM CHLORIDE 20 MEQ: 14.9 INJECTION, SOLUTION INTRAVENOUS at 09:36

## 2024-11-14 RX ADMIN — PHENOBARBITAL SODIUM 260 MG: 65 INJECTION INTRAMUSCULAR at 11:03

## 2024-11-14 RX ADMIN — CALCIUM GLUCONATE 2 G: 20 INJECTION, SOLUTION INTRAVENOUS at 11:45

## 2024-11-14 RX ADMIN — SODIUM CHLORIDE, SODIUM LACTATE, POTASSIUM CHLORIDE, AND CALCIUM CHLORIDE 100 ML/HR: .6; .31; .03; .02 INJECTION, SOLUTION INTRAVENOUS at 19:27

## 2024-11-14 RX ADMIN — DEXTROSE, SODIUM CHLORIDE, SODIUM LACTATE, POTASSIUM CHLORIDE, AND CALCIUM CHLORIDE 125 ML/HR: 5; .6; .31; .03; .02 INJECTION, SOLUTION INTRAVENOUS at 09:22

## 2024-11-14 RX ADMIN — METOPROLOL TARTRATE 50 MG: 50 TABLET, FILM COATED ORAL at 19:02

## 2024-11-14 RX ADMIN — DEXTROSE AND SODIUM CHLORIDE 500 ML: 5; .9 INJECTION, SOLUTION INTRAVENOUS at 02:16

## 2024-11-14 RX ADMIN — POTASSIUM CHLORIDE 20 MEQ: 14.9 INJECTION, SOLUTION INTRAVENOUS at 11:52

## 2024-11-14 RX ADMIN — APIXABAN 5 MG: 5 TABLET, FILM COATED ORAL at 19:01

## 2024-11-14 RX ADMIN — PHENOBARBITAL SODIUM 130 MG: 65 INJECTION INTRAMUSCULAR; INTRAVENOUS at 16:04

## 2024-11-14 RX ADMIN — IPRATROPIUM BROMIDE AND ALBUTEROL SULFATE 3 ML: .5; 3 SOLUTION RESPIRATORY (INHALATION) at 07:16

## 2024-11-14 RX ADMIN — DEXTROSE, SODIUM CHLORIDE, SODIUM LACTATE, POTASSIUM CHLORIDE, AND CALCIUM CHLORIDE 125 ML/HR: 5; .6; .31; .03; .02 INJECTION, SOLUTION INTRAVENOUS at 19:00

## 2024-11-14 RX ADMIN — DEXTROSE AND SODIUM CHLORIDE 500 ML: 5; .9 INJECTION, SOLUTION INTRAVENOUS at 01:53

## 2024-11-14 RX ADMIN — METOPROLOL TARTRATE 25 MG: 25 TABLET, FILM COATED ORAL at 11:09

## 2024-11-14 RX ADMIN — MIDAZOLAM 2 MG: 1 INJECTION INTRAMUSCULAR; INTRAVENOUS at 05:06

## 2024-11-14 NOTE — ASSESSMENT & PLAN NOTE
Chronic, appears hypovolemic on admission - no evidence of acute exacerbation. Home medications include lopressor 50 mg BID    BNP 1150    Plan:  Cardiology consulted,   Trial of 1 time dose of IV Lasix 40mg   Daily weights, I&O's  Continue Lopressor 50mg BID

## 2024-11-14 NOTE — ASSESSMENT & PLAN NOTE
Continue current inhaler/nebulizer regimen  Will begin steroids given oxygen need, wheezing on exam  Wean oxygen for goal SpO2>88%

## 2024-11-14 NOTE — H&P
H&P - Family Medicine   Name: Gregg Partida 64 y.o. male I MRN: [id number]  Unit/Bed#: ED CT1 I Date of Admission: 11/14/2024   Date of Service: 11/14/2024 I Hospital Day: 0   { ?Quick Links I Problem List I PORCH I Billing Tip:23195}  Assessment & Plan      Code Status: Prior  Admission Status: INPATIENT   Disposition: Patient requires Stepdown Level 2      History of Present Illness   Gregg Partida is a 64 year old male with PMHX of     Review of Systems  I have reviewed the patient's PMH, PSH, Social History, Family History, Meds, and Allergies    Objective :{?Quick Links I ICU Summary I Vitals I I/Os I LDAs I Mobility (PT/OT) I Code Status / ACP   ?Quick Links I Active Meds I Pain Meds I Antibiotics I Anticoagulants:70089}  Temp:  [97.7 °F (36.5 °C)] 97.7 °F (36.5 °C)  HR:  [] 130  BP: ()/(51-86) 101/59  Resp:  [16-22] 18  SpO2:  [91 %-98 %] 93 %  O2 Device: None (Room air)  Nasal Cannula O2 Flow Rate (L/min):  [2 L/min] 2 L/min    Intake & Output:  I/O         11/12 0701  11/13 0700 11/13 0701  11/14 0700    I.V.  0    IV Piggyback  1050    Total Intake  1050    Net  +1050                Weights:        There is no height or weight on file to calculate BMI.  Weight (last 2 days)       None          Physical Exam      {?Quick Links I Lab Review I Micro Results I Radiology I Cardiology:39411}  Lab Results: I have reviewed the following results:  Recent Labs     11/14/24  0032 11/14/24  0053 11/14/24  0226 11/14/24  0419   WBC 7.00  --   --   --    HGB 12.6  --   --   --    HCT 36.6  --   --   --    PLT 96*  --   --   --    SODIUM 130*  --  130*  --    K 3.7  --  3.5  --    CL 89*  --  96  --    CO2 20*  --  17*  --    BUN 37*  --  35*  --    CREATININE 1.60*  --  1.44*  --    GLUC 71  --  197*  --    CAIONIZED  --   --   --  0.85*   MG  --   --  1.1*  --    AST 82*  --  63*  --    ALT 46  --  35  --    ALB 3.7  --  3.0*  --    TBILI 0.74  --  0.54  --    ALKPHOS 133*  --  102  --    INR  --  1.13   "--   --    HSTNI0 38  --   --   --    HSTNI2  --   --  31  --      Micro:  Lab Results   Component Value Date    BLOODCX No Growth After 5 Days. 05/10/2024    BLOODCX No Growth After 5 Days. 05/10/2024    BLOODCX No Growth After 5 Days. 03/25/2024    BLOODCX No Growth After 5 Days. 03/25/2024    URINECX No Growth <1000 cfu/mL 10/29/2023       {Imaging Results Review:21529::\"No pertinent imaging studies reviewed.\"}  Other Study Results Review: EKG was reviewed.     Currently Ordered Meds:   Current Facility-Administered Medications:     calcium gluconate 1 g in sodium chloride 0.9% 50 mL (premix), Once, Last Rate: 1 g (11/14/24 0432)    calcium gluconate 1 g in sodium chloride 0.9% 50 mL (premix), Once    diltiazem (CARDIZEM) 125 mg in sodium chloride 0.9 % 125 mL infusion, Titrated, Last Rate: 7.5 mg/hr (11/14/24 9800)    lactated ringers infusion, Continuous, Last Rate: 125 mL/hr (11/14/24 9857)    magnesium sulfate 4 g/100 mL IVPB (premix) 4 g, Once    midazolam (VERSED) injection 2 mg, Once  VTE Pharmacologic Prophylaxis: VTE covered by: Eliquis     VTE Mechanical Prophylaxis: sequential compression device    Administrative Statements   {Time Spent (Optional):26259}  Portions of the record may have been created with voice recognition software.  Occasional wrong word or \"sound a like\" substitutions may have occurred due to the inherent limitations of voice recognition software.  Read the chart carefully and recognize, using context, where substitutions have occurred.  ==  Mary Ann Stockton DO  [facility]  Family Medicine Residency PGY-2  "

## 2024-11-14 NOTE — ASSESSMENT & PLAN NOTE
Na 130, Cl 89, bicarb 20, BUN 37, Ca 7.6 in setting of chronic alcohol abuse and starvation. Plan to monitor and replete as indicated

## 2024-11-14 NOTE — ASSESSMENT & PLAN NOTE
Would begin oral metoprolol as mentation improves  Decrease diltiazem as able  If unable to take oral, transition Eliquis to heparin infusion

## 2024-11-14 NOTE — ASSESSMENT & PLAN NOTE
Observe for signs of withdrawal  Favor transition to phenobarbital   Encourage cessation  Begin thiamine/folate

## 2024-11-14 NOTE — ED PROCEDURE NOTE
PROCEDURE  CriticalCare Time    Date/Time: 11/14/2024 2:08 AM    Performed by: Fito Liang MD  Authorized by: Fito Liang MD    Critical care provider statement:     Critical care time (minutes):  111    Critical care time was exclusive of:  Separately billable procedures and treating other patients and teaching time    Critical care was necessary to treat or prevent imminent or life-threatening deterioration of the following conditions:  Cardiac failure, dehydration and shock    Critical care was time spent personally by me on the following activities:  Blood draw for specimens, obtaining history from patient or surrogate, development of treatment plan with patient or surrogate, evaluation of patient's response to treatment, examination of patient, review of old charts, re-evaluation of patient's condition, ordering and review of radiographic studies, ordering and review of laboratory studies and ordering and performing treatments and interventions       Fito Liang MD  11/14/24 0209       Fito Liang MD  11/14/24 0641

## 2024-11-14 NOTE — PROGRESS NOTES
Pt transferred to . Report given to Lashon SORIANO. Skin check completed prior to transfer, all belongings sent with patient.

## 2024-11-14 NOTE — ASSESSMENT & PLAN NOTE
"Lab Results   Component Value Date    HGBA1C 5.4 08/15/2024       No results for input(s): \"POCGLU\" in the last 72 hours.    Blood Sugar Average: Last 72 hrs:    Will begin insulin infusion for now  Transition to sliding scale when anion gap closes  "

## 2024-11-14 NOTE — ASSESSMENT & PLAN NOTE
Plt 96 on admission, similar to baseline. Suspect 2/2 chronic alcohol abuse.    Plan:  -Hold ASA  -Monitor for bleeding  -Continue home eliquis

## 2024-11-14 NOTE — ASSESSMENT & PLAN NOTE
Cr 1.60 on admission, baseline 1 -1.2. Suspect secondary to dehydration, starvation acidosis, significant alcohol abuse.    Plan:  -Pending UA  -Urinary retention protocol   -Avoid hypotension   -IVF  -Daily weights, strict I&O's  -Avoid nephrotoxins as able

## 2024-11-14 NOTE — ASSESSMENT & PLAN NOTE
Chronic, noting hx of several falls in setting of chronic alcohol abuse - hematomas noted diffusely on skin. PT/OT ordered    CT head wo contrast: no acute intracranial abnormality  CT C-spine wo contrast: No acute cervical spine fracture or traumatic malalignment.  CT chest/abd/pelvis wo contrast: mildly displaced left lateral 7th rib fracture, no hemothorax/pneumothorax, no acute trauma in abdomen or pelvis

## 2024-11-14 NOTE — ASSESSMENT & PLAN NOTE
Lab Results   Component Value Date    EGFR 51 11/14/2024    EGFR 45 11/14/2024    EGFR 60 11/04/2024    CREATININE 1.44 (H) 11/14/2024    CREATININE 1.60 (H) 11/14/2024    CREATININE 1.27 11/04/2024     Continue volume resuscitation  Close intake and output  Daily weights  Trend serum creatinine

## 2024-11-14 NOTE — ED PROVIDER NOTES
Final Diagnosis:  1. Atrial fibrillation with rapid ventricular response (HCC)    2. Dehydration    3. Hyponatremia    4. Alcohol abuse    5. Alcohol intoxication ([facility])    6. Fall, initial encounter    7. Rib fracture    8. Multiple abrasions    9. Alcoholic ketoacidosis    10. Thrombocytopenia (HCC)    11. ENMA (acute kidney injury) ([facility])        Chief Complaint   Patient presents with    Atrial Fibrillation     Pt in afib RVR per medics. Given 15mg cardizem in route and bp in 60's systolic upon arrival to ER       HPI  Pt well known to er comes freq for alcohol intox    Tonight called for chest pain sob, they came out and he declined then kept drinking and called again. Then very intox. They noted HR in 150s. Gave dilt for known afib. Pt reports he doesn't take his meds including rate control and eloquis. He's been falling multiple scrapes bruises etc.     On arrival BP in 70s systolic getting a 500cc bag of fluid. We place to pressure.   Pt intox yelling about wanting a sandwich fighting off people around him.     Doesn't report pain or falls or anything other than turkey sandwich    EMS additionally reports:     - Previous charting underwent limited review with attention to last ED visits, labs, ekgs, and prior imaging.  Chart review reveals :     Admission on 11/04/2024, Discharged on 11/04/2024   Component Date Value Ref Range Status    Ventricular Rate 11/04/2024 109  BPM Final    Atrial Rate 11/04/2024 234  BPM Final    QRSD Interval 11/04/2024 94  ms Final    QT Interval 11/04/2024 344  ms Final    QTC Interval 11/04/2024 463  ms Final    QRS Axis 11/04/2024 82  degrees Final    T Wave Axis 11/04/2024 -13  degrees Final    WBC 11/04/2024 6.54  4.31 - 10.16 Thousand/uL Final    RBC 11/04/2024 4.13  3.88 - 5.62 Million/uL Final    Hemoglobin 11/04/2024 13.4  12.0 - 17.0 g/dL Final    Hematocrit 11/04/2024 39.1  36.5 - 49.3 % Final    MCV 11/04/2024 95  82 - 98 fL Final    MCH 11/04/2024 32.4  26.8 - 34.3 pg  Final    MCHC 11/04/2024 34.3  31.4 - 37.4 g/dL Final    RDW 11/04/2024 15.8 (H)  11.6 - 15.1 % Final    MPV 11/04/2024 10.3  8.9 - 12.7 fL Final    Platelets 11/04/2024 101 (L)  149 - 390 Thousands/uL Final    Results verified by repeat, No Clots, PLTF Acceptable    nRBC 11/04/2024 0  /100 WBCs Final    Segmented % 11/04/2024 66  43 - 75 % Final    Immature Grans % 11/04/2024 0  0 - 2 % Final    Lymphocytes % 11/04/2024 22  14 - 44 % Final    Monocytes % 11/04/2024 11  4 - 12 % Final    Eosinophils Relative 11/04/2024 0  0 - 6 % Final    Basophils Relative 11/04/2024 1  0 - 1 % Final    Absolute Neutrophils 11/04/2024 4.25  1.85 - 7.62 Thousands/µL Final    Absolute Immature Grans 11/04/2024 0.02  0.00 - 0.20 Thousand/uL Final    Absolute Lymphocytes 11/04/2024 1.44  0.60 - 4.47 Thousands/µL Final    Absolute Monocytes 11/04/2024 0.73  0.17 - 1.22 Thousand/µL Final    Eosinophils Absolute 11/04/2024 0.01  0.00 - 0.61 Thousand/µL Final    Basophils Absolute 11/04/2024 0.09  0.00 - 0.10 Thousands/µL Final    Sodium 11/04/2024 135  135 - 147 mmol/L Final    Potassium 11/04/2024 3.6  3.5 - 5.3 mmol/L Final    Chloride 11/04/2024 97  96 - 108 mmol/L Final    CO2 11/04/2024 24  21 - 32 mmol/L Final    ANION GAP 11/04/2024 14 (H)  4 - 13 mmol/L Final    BUN 11/04/2024 18  5 - 25 mg/dL Final    Creatinine 11/04/2024 1.27  0.60 - 1.30 mg/dL Final    Standardized to IDMS reference method    Glucose 11/04/2024 109  65 - 140 mg/dL Final    If the patient is fasting, the ADA then defines impaired fasting glucose as > 100 mg/dL and diabetes as > or equal to 123 mg/dL.    Calcium 11/04/2024 8.3 (L)  8.4 - 10.2 mg/dL Final    AST 11/04/2024 51 (H)  13 - 39 U/L Final    ALT 11/04/2024 37  7 - 52 U/L Final    Specimen collection should occur prior to Sulfasalazine administration due to the potential for falsely depressed results.     Alkaline Phosphatase 11/04/2024 89  34 - 104 U/L Final    Total Protein 11/04/2024 7.2  6.4 - 8.4  "g/dL Final    Albumin 11/04/2024 3.9  3.5 - 5.0 g/dL Final    Total Bilirubin 11/04/2024 0.62  0.20 - 1.00 mg/dL Final    Use of this assay is not recommended for patients undergoing treatment with eltrombopag due to the potential for falsely elevated results.  N-acetyl-p-benzoquinone imine (metabolite of Acetaminophen) will generate erroneously low results in samples for patients that have taken an overdose of Acetaminophen.    eGFR 11/04/2024 60  ml/min/1.73sq m Final    hs TnI 0hr 11/04/2024 46  \"Refer to ACS Flowchart\"- see link ng/L Final    Comment:                                              Initial (time 0) result  If >=50 ng/L, Myocardial injury suggested ;  Type of myocardial injury and treatment strategy  to be determined.  If 5-49 ng/L, a delta result at 2 hours and or 4 hours will be needed to further evaluate.  If <4 ng/L, and chest pain has been >3 hours since onset, patient may qualify for discharge based on the HEART score in the ED.  If <5 ng/L and <3hours since onset of chest pain, a delta result at 2 hours will be needed to further evaluate.    HS Troponin 99th Percentile URL of a Health Population=12 ng/L with a 95% Confidence Interval of 8-18 ng/L.    Second Troponin (time 2 hours)  If calculated delta >= 20 ng/L,  Myocardial injury suggested ; Type of myocardial injury and treatment strategy to be determined.  If 5-49 ng/L and the calculated delta is 5-19 ng/L, consult medical service for evaluation.  Continue evaluation for ischemia on ecg and other possible etiology and repeat hs troponin at 4 hours.  If delta                            is <5 ng/L at 2 hours, consider discharge based on risk stratification via the HEART score (if in ED), or LAZARA risk score in IP/Observation.    HS Troponin 99th Percentile URL of a Health Population=12 ng/L with a 95% Confidence Interval of 8-18 ng/L.    hs TnI 2hr 11/04/2024 46  \"Refer to ACS Flowchart\"- see link ng/L Final    Comment:                       "                        Initial (time 0) result  If >=50 ng/L, Myocardial injury suggested ;  Type of myocardial injury and treatment strategy  to be determined.  If 5-49 ng/L, a delta result at 2 hours and or 4 hours will be needed to further evaluate.  If <4 ng/L, and chest pain has been >3 hours since onset, patient may qualify for discharge based on the HEART score in the ED.  If <5 ng/L and <3hours since onset of chest pain, a delta result at 2 hours will be needed to further evaluate.    HS Troponin 99th Percentile URL of a Health Population=12 ng/L with a 95% Confidence Interval of 8-18 ng/L.    Second Troponin (time 2 hours)  If calculated delta >= 20 ng/L,  Myocardial injury suggested ; Type of myocardial injury and treatment strategy to be determined.  If 5-49 ng/L and the calculated delta is 5-19 ng/L, consult medical service for evaluation.  Continue evaluation for ischemia on ecg and other possible etiology and repeat hs troponin at 4 hours.  If delta                            is <5 ng/L at 2 hours, consider discharge based on risk stratification via the HEART score (if in ED), or LAZARA risk score in IP/Observation.    HS Troponin 99th Percentile URL of a Health Population=12 ng/L with a 95% Confidence Interval of 8-18 ng/L.    Delta 2hr hsTnI 11/04/2024 0  <20 ng/L Final       - No language barrier.   - History obtained from patient    - Discuss patient's care, with patient permission or by chart review, with      PMH:   has a past medical history of Acute on chronic kidney failure  (HCC), Alcohol withdrawal (HCC) (06/07/2019), Atrial fibrillation (HCC), Cancer (HCC), Cardiac disease, COPD (chronic obstructive pulmonary disease) (HCC), Coronary artery disease, ETOH abuse, Heart failure (HCC), History of heart surgery, heart artery stent, Hyperlipidemia, Hypertension, Hyponatremia (10/17/2019), Hypovolemic shock (HCC) (12/22/2019), Lumbar spondylitis (HCC) (10/13/2022), Metabolic acidosis, increased  anion gap (04/21/2021), Nasal bone fracture (10/10/2022), Prostate CA (HCC), S/P CABG x 3, and Sleep apnea.    PSH:   has a past surgical history that includes Tonsillectomy; Coronary artery bypass graft (2004); pr arthrd ant interbody min dsc crv below c2 (N/A, 12/16/2020); and Cardiac catheterization.     Social History:  Tobacco Use: High Risk (11/14/2024)    Patient History     Smoking Tobacco Use: Every Day     Smokeless Tobacco Use: Never     Passive Exposure: Not on file     Alcohol Use: Alcohol Misuse (10/17/2022)    AUDIT-C     Frequency of Alcohol Consumption: 4 or more times a week     Average Number of Drinks: 10 or more     Frequency of Binge Drinking: Daily or almost daily     No illicit use       ROS:  Pertinent positives/negatives: .     Some ROS may be present in the HPI and would take precedent over these standard questions asked below.   Review of Systems   Unable to perform ROS: Mental status change          PE:     Physical exam highlights:   Physical Exam       Vitals:    11/14/24 0600 11/14/24 0615 11/14/24 0625 11/14/24 0630   BP: 93/54 98/53 94/85 101/58   BP Location: Left arm Left arm Left arm Left arm   Pulse: (!) 131 97 96 (!) 129   Resp: (!) 26 (!) 24 (!) 24 (!) 29   Temp:       TempSrc:       SpO2: 98% 96% 97% 97%     Vitals reviewed by me.   Nursing note reviewed  Chaperone present for all sensitive exam.  Const: acute distress. Alert. Nontoxic. Not diaphoretic.  intox  HEENT: External ears normal. No protrusion drainage swelling. Nose normal. No drainage/traumatic deformity. MM so very dry. Mouth with baseline/symmetric movement. No trismus.   Eyes: No squinting. No icterus. No tearing/swelling/drainage. Tracks through the room with normal EOM. nystag  Neck: ROM normal. No rigidity. No meningismus.  Cards: Rate as per vitals. Around 110 irregular. Compared to monitor afib unless documented.   Pulm: Effort and excursion normal. No distress. No audible wheezing/no stridor. Inc RR. No  rales on arrival. After lots of fluid has some rhonchi and wheeze  Abd: No distension beyond baseline. No fluctuant wave. Patient without peritoneal pain with shifting/bumping the bed. soft  MSK: ROM normal baseline. No deformity. No contractures from baseline.   Skin: No new rashes visible. Well perfused. No wounds visualized on exposed skin  Neuro: Nonfocal. Baseline. CN grossly intact. Moving all four with ataxia expected w/ intoxication.   Psych: aggressive, uncooperative, agitated        A:  - Nursing note reviewed.    Ddx and MDM  Considered diagnoses    Pt dehydrated  Says hasn't eaten in 3 days (hence the turkey)  Then got dilt bolus en route and dropped his pressure.   Here we pressure a bad of saline, then start isolyte and d5ns (for alcoholic ketoacidosis).     Acidotic w/ gap  Kussmauls  Fluid resusc, d5ns, versed for withdraw as it happens, thiamine  Trend lytes  Hyponatremia - mild, watch  Hypomag - replace  hypoCa - replace  On repeat acidosis and gap improving.     Afib rvr. As pressure inc w fluids initially controlled with metop x 2  Admit to floor  Then pt gets up and starts vomiting from withdrawal  HR bumps again and goes on dilt gtt  Another lopressor dose  Quick escalation of dilt  Additional fluids and withdrawal control  Rate still not controlled  I rec amiodarone vs esmolol to balance HR and pressures    Starts to get gurgly and wheezy like pulm edema after 1.5L NS, 1L D5ns, 1L LR, 1Lisolyte  ICU rec albumin  At this pt just maintenance fluids      Otherwise r/o ACS  Trend trops  EKG aflutter variable conduction nonspec signs of strain    R/o trauma  CT head none  CT c spine none  CT abd pelv - none  CT chest - 7th rib fx    ENMA - likely dehydration  Trend  Improving          Dispo decision icu vs step down to fam med       My conversation with consultant reveals:    Hold amio  Hold esmolol  Add albumin    Decision rules:                           My read of the XR/CT scan reveals:     CT  head without contrast   Final Result      No acute intracranial abnormality.                  Workstation performed: [id number]         CT spine cervical without contrast   Final Result      No acute cervical spine fracture or traumatic malalignment.                  Workstation performed: [id number]         CT chest abdomen pelvis wo contrast   Final Result         1. Mildly displaced left lateral seventh rib fracture.   2. No hemothorax or pneumothorax.   3. No acute trauma in the abdomen or pelvis.               Workstation performed: [id number]             Orders Placed This Encounter   Procedures    Critical Care    FLU/COVID Rapid Antigen (30 min. TAT) - Preferred screening test in ED    CT head without contrast    CT spine cervical without contrast    CT chest abdomen pelvis wo contrast    CBC and differential    Comprehensive metabolic panel    Protime-INR    HS Troponin 0hr (reflex protocol)    Ethanol    HS Troponin I 2hr    HS Troponin I 4hr    Comprehensive metabolic panel    Magnesium    Calcium, ionized    Blood gas, venous    Lactic acid, plasma (w/reflex if result > 2.0)    Magnesium    Comprehensive metabolic panel    CBC and differential    Hemoglobin A1c w/EAG Estimation (Prechecked if no A1C within 90 days)    Urinalysis with microscopic    Diet Cardiovascular; Cardiac    Vital Signs per unit routine    Up and OOB as tolerated    I/O    Continuous Pulse Oximetry    24 Hour Telemetry Monitoring    Daily weights - Use standing scale to weigh patient    Apply SCD or Foot pumps    Follow CIWA-Ar Nursing Protocol    Notify physician to discontinue CIWA protocol if CIWA score remains 0-7 for 72 consecutive hours    Notify physician (Vital Signs)    Notify physician for acute change in mental status    Notify physician of seizure activity    Urinary retention protocol    Insulin Subcutaneous Notify Physician    Insulin Subcutaneous Instruction    Hypoglycemia Protocol    Fingerstick Glucose (POCT)     Intake and Output    Daily weights    Urinary retention protocol    Medication hold parameters, if not noted on medication order    Level 1-Full Code: all life saving measures are indicated    OT eval and treat    PT eval and treat    ECG 12 lead    ECG 12 lead    ECG 12 lead    INPATIENT ADMISSION    Fall precautions    Aspiration precautions    Seizure precautions    Update level of care     Labs Reviewed   CBC AND DIFFERENTIAL - Abnormal       Result Value Ref Range Status    WBC 7.00  4.31 - 10.16 Thousand/uL Final    RBC 3.79 (*) 3.88 - 5.62 Million/uL Final    Hemoglobin 12.6  12.0 - 17.0 g/dL Final    Hematocrit 36.6  36.5 - 49.3 % Final    MCV 97  82 - 98 fL Final    MCH 33.2  26.8 - 34.3 pg Final    MCHC 34.4  31.4 - 37.4 g/dL Final    RDW 15.7 (*) 11.6 - 15.1 % Final    MPV 11.1  8.9 - 12.7 fL Final    Platelets 96 (*) 149 - 390 Thousands/uL Final    Comment: Results verified by repeat. Manual Review of Smear Performed    nRBC 0  /100 WBCs Final    Segmented % 78 (*) 43 - 75 % Final    Immature Grans % 1  0 - 2 % Final    Lymphocytes % 10 (*) 14 - 44 % Final    Monocytes % 11  4 - 12 % Final    Eosinophils Relative 0  0 - 6 % Final    Basophils Relative 0  0 - 1 % Final    Absolute Neutrophils 5.46  1.85 - 7.62 Thousands/µL Final    Absolute Immature Grans 0.06  0.00 - 0.20 Thousand/uL Final    Absolute Lymphocytes 0.68  0.60 - 4.47 Thousands/µL Final    Absolute Monocytes 0.75  0.17 - 1.22 Thousand/µL Final    Eosinophils Absolute 0.02  0.00 - 0.61 Thousand/µL Final    Basophils Absolute 0.03  0.00 - 0.10 Thousands/µL Final    Narrative:     This is an appended report.  These results have been appended to a previously verified report.   COMPREHENSIVE METABOLIC PANEL - Abnormal    Sodium 130 (*) 135 - 147 mmol/L Final    Potassium 3.7  3.5 - 5.3 mmol/L Final    Chloride 89 (*) 96 - 108 mmol/L Final    CO2 20 (*) 21 - 32 mmol/L Final    ANION GAP 21 (*) 4 - 13 mmol/L Final    BUN 37 (*) 5 - 25 mg/dL Final     Creatinine 1.60 (*) 0.60 - 1.30 mg/dL Final    Comment: Standardized to IDMS reference method    Glucose 71  65 - 140 mg/dL Final    Comment: If the patient is fasting, the ADA then defines impaired fasting glucose as > 100 mg/dL and diabetes as > or equal to 123 mg/dL.    Calcium 7.6 (*) 8.4 - 10.2 mg/dL Final    AST 82 (*) 13 - 39 U/L Final    ALT 46  7 - 52 U/L Final    Comment: Specimen collection should occur prior to Sulfasalazine administration due to the potential for falsely depressed results.     Alkaline Phosphatase 133 (*) 34 - 104 U/L Final    Total Protein 6.9  6.4 - 8.4 g/dL Final    Albumin 3.7  3.5 - 5.0 g/dL Final    Total Bilirubin 0.74  0.20 - 1.00 mg/dL Final    Comment: Use of this assay is not recommended for patients undergoing treatment with eltrombopag due to the potential for falsely elevated results.  N-acetyl-p-benzoquinone imine (metabolite of Acetaminophen) will generate erroneously low results in samples for patients that have taken an overdose of Acetaminophen.    eGFR 45  ml/min/1.73sq m Final    Narrative:     National Kidney Disease Foundation guidelines for Chronic Kidney Disease (CKD):     Stage 1 with normal or high GFR (GFR > 90 mL/min/1.73 square meters)    Stage 2 Mild CKD (GFR = 60-89 mL/min/1.73 square meters)    Stage 3A Moderate CKD (GFR = 45-59 mL/min/1.73 square meters)    Stage 3B Moderate CKD (GFR = 30-44 mL/min/1.73 square meters)    Stage 4 Severe CKD (GFR = 15-29 mL/min/1.73 square meters)    Stage 5 End Stage CKD (GFR <15 mL/min/1.73 square meters)  Note: GFR calculation is accurate only with a steady state creatinine   PROTIME-INR - Abnormal    Protime 15.1 (*) 12.3 - 15.0 seconds Final    INR 1.13  0.85 - 1.19 Final    Narrative:     INR Therapeutic Range    Indication                                             INR Range      Atrial Fibrillation                                               2.0-3.0  Hypercoagulable State                                     2.0.2.3  Left Ventricular Asist Device                            2.0-3.0  Mechanical Heart Valve                                  -    Aortic(with afib, MI, embolism, HF, LA enlargement,    and/or coagulopathy)                                     2.0-3.0 (2.5-3.5)     Mitral                                                             2.5-3.5  Prosthetic/Bioprosthetic Heart Valve               2.0-3.0  Venous thromboembolism (VTE: VT, PE        2.0-3.0   MEDICAL ALCOHOL - Abnormal    Ethanol Lvl 381 (*) <10 mg/dL Final   COMPREHENSIVE METABOLIC PANEL - Abnormal    Sodium 130 (*) 135 - 147 mmol/L Final    Potassium 3.5  3.5 - 5.3 mmol/L Final    Chloride 96  96 - 108 mmol/L Final    CO2 17 (*) 21 - 32 mmol/L Final    ANION GAP 17 (*) 4 - 13 mmol/L Final    BUN 35 (*) 5 - 25 mg/dL Final    Creatinine 1.44 (*) 0.60 - 1.30 mg/dL Final    Comment: Standardized to IDMS reference method    Glucose 197 (*) 65 - 140 mg/dL Final    Comment: If the patient is fasting, the ADA then defines impaired fasting glucose as > 100 mg/dL and diabetes as > or equal to 123 mg/dL.    Calcium 6.4 (*) 8.4 - 10.2 mg/dL Final    Corrected Calcium 7.2 (*) 8.3 - 10.1 mg/dL Final    AST 63 (*) 13 - 39 U/L Final    ALT 35  7 - 52 U/L Final    Comment: Specimen collection should occur prior to Sulfasalazine administration due to the potential for falsely depressed results.     Alkaline Phosphatase 102  34 - 104 U/L Final    Total Protein 5.5 (*) 6.4 - 8.4 g/dL Final    Albumin 3.0 (*) 3.5 - 5.0 g/dL Final    Total Bilirubin 0.54  0.20 - 1.00 mg/dL Final    Comment: Use of this assay is not recommended for patients undergoing treatment with eltrombopag due to the potential for falsely elevated results.  N-acetyl-p-benzoquinone imine (metabolite of Acetaminophen) will generate erroneously low results in samples for patients that have taken an overdose of Acetaminophen.    eGFR 51  ml/min/1.73sq m Final    Narrative:     National Kidney Disease  "Foundation guidelines for Chronic Kidney Disease (CKD):     Stage 1 with normal or high GFR (GFR > 90 mL/min/1.73 square meters)    Stage 2 Mild CKD (GFR = 60-89 mL/min/1.73 square meters)    Stage 3A Moderate CKD (GFR = 45-59 mL/min/1.73 square meters)    Stage 3B Moderate CKD (GFR = 30-44 mL/min/1.73 square meters)    Stage 4 Severe CKD (GFR = 15-29 mL/min/1.73 square meters)    Stage 5 End Stage CKD (GFR <15 mL/min/1.73 square meters)  Note: GFR calculation is accurate only with a steady state creatinine   MAGNESIUM - Abnormal    Magnesium 1.1 (*) 1.9 - 2.7 mg/dL Final   CALCIUM, IONIZED - Abnormal    Calcium, Ionized 0.85 (*) 1.12 - 1.32 mmol/L Final   BLOOD GAS, VENOUS - Abnormal    pH, Parrish 7.291 (*) 7.300 - 7.400 Final    pCO2, Parrish 38.3 (*) 42.0 - 50.0 mm Hg Final    pO2, Parrish 56.0 (*) 35.0 - 45.0 mm Hg Final    HCO3, Parrish 18.0 (*) 24 - 30 mmol/L Final    Base Excess, Parrish -7.9  mmol/L Final    O2 Content, Parrish 13.5  ml/dL Final    O2 HGB, VENOUS 79.8  60.0 - 80.0 % Final   SMEAR REVIEW(PHLEBS DO NOT ORDER) - Abnormal    RBC Morphology Present   Final    Platelet Estimate Decreased (*) Adequate Final    Anisocytosis Present   Final   HS TROPONIN I 0HR - Normal    hs TnI 0hr 38  \"Refer to ACS Flowchart\"- see link ng/L Final    Comment:                                              Initial (time 0) result  If >=50 ng/L, Myocardial injury suggested ;  Type of myocardial injury and treatment strategy  to be determined.  If 5-49 ng/L, a delta result at 2 hours and or 4 hours will be needed to further evaluate.  If <4 ng/L, and chest pain has been >3 hours since onset, patient may qualify for discharge based on the HEART score in the ED.  If <5 ng/L and <3hours since onset of chest pain, a delta result at 2 hours will be needed to further evaluate.    HS Troponin 99th Percentile URL of a Health Population=12 ng/L with a 95% Confidence Interval of 8-18 ng/L.    Second Troponin (time 2 hours)  If calculated delta >= 20 " "ng/L,  Myocardial injury suggested ; Type of myocardial injury and treatment strategy to be determined.  If 5-49 ng/L and the calculated delta is 5-19 ng/L, consult medical service for evaluation.  Continue evaluation for ischemia on ecg and other possible etiology and repeat hs troponin at 4 hours.  If delta is <5 ng/L at 2 hours, consider discharge based on risk stratification via the HEART score (if in ED), or LAZARA risk score in IP/Observation.    HS Troponin 99th Percentile URL of a Health Population=12 ng/L with a 95% Confidence Interval of 8-18 ng/L.   HS TROPONIN I 2HR - Normal    hs TnI 2hr 31  \"Refer to ACS Flowchart\"- see link ng/L Final    Comment:                                              Initial (time 0) result  If >=50 ng/L, Myocardial injury suggested ;  Type of myocardial injury and treatment strategy  to be determined.  If 5-49 ng/L, a delta result at 2 hours and or 4 hours will be needed to further evaluate.  If <4 ng/L, and chest pain has been >3 hours since onset, patient may qualify for discharge based on the HEART score in the ED.  If <5 ng/L and <3hours since onset of chest pain, a delta result at 2 hours will be needed to further evaluate.    HS Troponin 99th Percentile URL of a Health Population=12 ng/L with a 95% Confidence Interval of 8-18 ng/L.    Second Troponin (time 2 hours)  If calculated delta >= 20 ng/L,  Myocardial injury suggested ; Type of myocardial injury and treatment strategy to be determined.  If 5-49 ng/L and the calculated delta is 5-19 ng/L, consult medical service for evaluation.  Continue evaluation for ischemia on ecg and other possible etiology and repeat hs troponin at 4 hours.  If delta is <5 ng/L at 2 hours, consider discharge based on risk stratification via the HEART score (if in ED), or LAZARA risk score in IP/Observation.    HS Troponin 99th Percentile URL of a Health Population=12 ng/L with a 95% Confidence Interval of 8-18 ng/L.    Delta 2hr hsTnI -7  <20 " "ng/L Final   HS TROPONIN I 4HR - Normal    hs TnI 4hr 33  \"Refer to ACS Flowchart\"- see link ng/L Final    Comment:                                              Initial (time 0) result  If >=50 ng/L, Myocardial injury suggested ;  Type of myocardial injury and treatment strategy  to be determined.  If 5-49 ng/L, a delta result at 2 hours and or 4 hours will be needed to further evaluate.  If <4 ng/L, and chest pain has been >3 hours since onset, patient may qualify for discharge based on the HEART score in the ED.  If <5 ng/L and <3hours since onset of chest pain, a delta result at 2 hours will be needed to further evaluate.    HS Troponin 99th Percentile URL of a Health Population=12 ng/L with a 95% Confidence Interval of 8-18 ng/L.    Second Troponin (time 2 hours)  If calculated delta >= 20 ng/L,  Myocardial injury suggested ; Type of myocardial injury and treatment strategy to be determined.  If 5-49 ng/L and the calculated delta is 5-19 ng/L, consult medical service for evaluation.  Continue evaluation for ischemia on ecg and other possible etiology and repeat hs troponin at 4 hours.  If delta is <5 ng/L at 2 hours, consider discharge based on risk stratification via the HEART score (if in ED), or LAZARA risk score in IP/Observation.    HS Troponin 99th Percentile URL of a Health Population=12 ng/L with a 95% Confidence Interval of 8-18 ng/L.    Delta 4hr hsTnI -5  <20 ng/L Final   LACTIC ACID, PLASMA (W/REFLEX IF RESULT > 2.0) - Normal    LACTIC ACID 0.8  0.5 - 2.0 mmol/L Final    Narrative:     Result may be elevated if tourniquet was used during collection.   COVID-19/INFLUENZA A/B RAPID ANTIGEN (30 MIN.TAT)   MAGNESIUM   COMPREHENSIVE METABOLIC PANEL   CBC AND DIFFERENTIAL   HEMOGLOBIN A1C   URINALYSIS WITH MICROSCOPIC       *Each of these labs was reviewed. Particular standout labs will be noted in the ED Course above     Final Diagnosis:  1. Atrial fibrillation with rapid ventricular response (HCC)    2. " Dehydration    3. Hyponatremia    4. Alcohol abuse    5. Alcohol intoxication ([facility])    6. Fall, initial encounter    7. Rib fracture    8. Multiple abrasions    9. Alcoholic ketoacidosis    10. Thrombocytopenia (HCC)    11. ENMA (acute kidney injury) ([facility])          P:  - hospital tx includes   Medications   diltiazem (CARDIZEM) 125 mg in sodium chloride 0.9 % 125 mL infusion (15 mg/hr Intravenous New Bag 11/14/24 0622)   calcium gluconate 1 g in sodium chloride 0.9% 50 mL (premix) (1 g Intravenous New Bag 11/14/24 0623)   acetaminophen (TYLENOL) tablet 650 mg (has no administration in time range)   nicotine (NICODERM CQ) 14 mg/24hr TD 24 hr patch 1 patch (has no administration in time range)   trimethobenzamide (TIGAN) IM injection 200 mg (has no administration in time range)   apixaban (ELIQUIS) tablet 5 mg (has no administration in time range)   fluticasone-vilanterol 200-25 mcg/actuation 1 puff (has no administration in time range)   folic acid (FOLVITE) tablet 1,000 mcg (has no administration in time range)   gabapentin (NEURONTIN) capsule 300 mg (has no administration in time range)   pantoprazole (PROTONIX) EC tablet 40 mg (has no administration in time range)   levalbuterol (XOPENEX) inhalation solution 1.25 mg (has no administration in time range)   ferrous sulfate tablet 325 mg (has no administration in time range)   tamsulosin (FLOMAX) capsule 0.4 mg (has no administration in time range)   thiamine tablet 100 mg (has no administration in time range)   senna-docusate sodium (SENOKOT S) 8.6-50 mg per tablet 1 tablet (has no administration in time range)   ranolazine (RANEXA) 12 hr tablet 500 mg (has no administration in time range)   insulin lispro (HumALOG/ADMELOG) 100 units/mL subcutaneous injection 1-6 Units (has no administration in time range)   multi-electrolyte (PLASMALYTE-A/ISOLYTE-S PH 7.4) IV solution (has no administration in time range)   magnesium sulfate 2 g/50 mL IVPB (premix) 2 g (2 g  Intravenous New Bag 11/14/24 0603)   ipratropium-albuterol (DUO-NEB) 0.5-2.5 mg/3 mL inhalation solution 3 mL (has no administration in time range)   magnesium sulfate 2 g/50 mL IVPB (premix) 2 g (has no administration in time range)   potassium chloride 20 mEq IVPB (premix) (has no administration in time range)   sodium chloride 0.9 % bolus 1,000 mL (0 mL Intravenous Stopped 11/14/24 0200)   thiamine (VITAMIN B1) 100 mg in sodium chloride 0.9 % 50 mL IVPB (0 mg Intravenous Stopped 11/14/24 0146)   dextrose 5 % and sodium chloride 0.9 % bolus 500 mL (0 mL Intravenous Stopped 11/14/24 0221)   metoprolol (LOPRESSOR) injection 5 mg (5 mg Intravenous Given 11/14/24 0143)   metoprolol (LOPRESSOR) injection 5 mg (5 mg Intravenous Given 11/14/24 0212)   dextrose 5 % and sodium chloride 0.9 % bolus 500 mL (0 mL Intravenous Stopped 11/14/24 0400)   multi-electrolyte (ISOLYTE-S PH 7.4) bolus 1,000 mL (0 mL Intravenous Stopped 11/14/24 0316)   midazolam (VERSED) injection 2 mg (2 mg Intravenous Given 11/14/24 0305)   calcium gluconate 1 g in sodium chloride 0.9% 50 mL (premix) (0 g Intravenous Stopped 11/14/24 0502)   midazolam (VERSED) injection 2 mg (2 mg Intravenous Given 11/14/24 0445)   magnesium sulfate 2 g/50 mL IVPB (premix) 2 g (0 g Intravenous Stopped 11/14/24 0533)   metoprolol (LOPRESSOR) injection 5 mg (5 mg Intravenous Given 11/14/24 0505)   midazolam (VERSED) injection 2 mg (2 mg Intravenous Given 11/14/24 0506)   albumin human (FLEXBUMIN) 5 % injection 12.5 g (0 g Intravenous Stopped 11/14/24 0628)         - disposition  Time reflects when diagnosis was documented in both MDM as applicable and the Disposition within this note       Time User Action Codes Description Comment    11/14/2024  1:59 AM Fito Liang Add [I48.91] Atrial fibrillation with rapid ventricular response (HCC)     11/14/2024  1:59 AM Fito Liang [E86.0] Dehydration     11/14/2024  2:00 AM Fito Liang [E87.1] Hyponatremia      11/14/2024  2:00 AM Fito Liang [F10.10] Alcohol abuse     11/14/2024  2:00 AM Fito Liang [F10.929] Alcohol intoxication (HCC)     11/14/2024  2:00 AM Fito Liang [W19.XXXA] Fall, initial encounter     11/14/2024  2:00 AM Fito Liang [S22.39XA] Rib fracture     11/14/2024  2:00 AM Fito Liang [T07.XXXA] Multiple abrasions     11/14/2024  2:00 AM Fito Liang [E87.29] Alcoholic ketoacidosis     11/14/2024  2:01 AM Fito Liang [D69.6] Thrombocytopenia (HCC)     11/14/2024  2:01 AM Fito Liang [N17.9] ENMA (acute kidney injury) ([facility])           ED Disposition       ED Disposition   Admit    Condition   Stable    Date/Time   Thu Nov 14, 2024  2:08 AM    Comment   Case was discussed with carlee and the patient's admission status was agreed to be Admission Status: observation status to the service of Dr. bailey .               Follow-up Information    None         - patient will call their PCP to let them know they were in the emergency department. We discuss return precautions and patient is agreeable with plan and aformentioned disposition.       - additional treatment intended, if consistent with primary provider:  - patient to follow with :      Patient's Medications   Discharge Prescriptions    No medications on file     No discharge procedures on file.  Prior to Admission Medications   Prescriptions Last Dose Informant Patient Reported? Taking?   Blood Glucose Monitoring Suppl (ONE TOUCH ULTRA MINI) w/Device KIT  Self Yes No   Sig: Use as directed   Patient not taking: Reported on 3/25/2024   Blood Pressure Monitoring (Adult Blood Pressure Cuff Lg) KIT   No No   Sig: Use in the morning   Patient not taking: Reported on 3/25/2024   Breo Ellipta 200-25 MCG/ACT inhaler  Self No No   Sig: Inhale 1 puff daily Rinse mouth after use.   ONETOUCH DELICA LANCETS FINE MISC  Self Yes No   Sig: 3 (three) times a day Test   Thiamine HCl (vitamin B-1) 100 MG  TABS  Self No No   Sig: TAKE 1 TABLET DAILY   albuterol (Ventolin HFA) 90 mcg/act inhaler  Self No No   Sig: Inhale 2 puffs every 4 (four) hours as needed for wheezing   aluminum-magnesium hydroxide-simethicone (MAALOX) 6304-2614-994 mg/30 mL suspension   No No   Sig: Take 30 mL by mouth every 4 (four) hours as needed for indigestion or heartburn   apixaban (Eliquis) 5 mg   No No   Sig: TAKE 1 TABLET BY MOUTH 2 TIMES A DAY DO NOT START BEFORE JANUARY 31, 2024.   aspirin (ECOTRIN LOW STRENGTH) 81 mg EC tablet  Self Yes No   Sig: Take 81 mg by mouth daily   ferrous sulfate 325 (65 Fe) mg tablet  Self No No   Sig: Take 1 tablet (325 mg total) by mouth daily with breakfast   folic acid (FOLVITE) 1 mg tablet   No No   Sig: TAKE 1 TABLET DAILY   gabapentin (NEURONTIN) 300 mg capsule   No No   Sig: TAKE ONE CAPSULE THREE TIMES DAILY   lidocaine (Lidoderm) 5 %   No No   Sig: Apply 1 patch topically over 12 hours daily Remove & Discard patch within 12 hours or as directed by MD   magnesium Oxide (MAG-OX) 400 mg TABS   No No   Sig: Take 1 tablet (400 mg total) by mouth 2 (two) times a day   metFORMIN (GLUCOPHAGE) 500 mg tablet   No No   Sig: TAKE 1 TABLET DAILY WITH BREAKFAST   metoprolol tartrate (LOPRESSOR) 50 mg tablet   No No   Sig: TAKE 1 TABLET BY MOUTH EVERY 12 HOURS   naltrexone (REVIA) 50 mg tablet   No No   Sig: Take 1 tablet (50 mg total) by mouth daily   naproxen (Naprosyn) 500 mg tablet   No No   Sig: Take 1 tablet (500 mg total) by mouth 2 (two) times a day with meals   nicotine (NICODERM CQ) 21 mg/24 hr TD 24 hr patch  Self No No   Sig: Place 1 patch on the skin over 24 hours daily Do not start before December 4, 2023.   pantoprazole (PROTONIX) 40 mg tablet   No No   Sig: TAKE 1 TABLET TWO TIMES A DAY   ranolazine (RANEXA) 500 mg 12 hr tablet   No No   Sig: TAKE 1 TABLET EVERY 12 HOURS   senna-docusate sodium (SENOKOT S) 8.6-50 mg per tablet   No No   Sig: Take 1 tablet by mouth daily as needed for constipation  "  tamsulosin (FLOMAX) 0.4 mg   No No   Sig: TAKE 1 CAPSULE DAILY      Facility-Administered Medications: None       Portions of the record may have been created with voice recognition software. Occasional wrong word or \"sound a like\" substitutions may have occurred due to the inherent limitations of voice recognition software. Read the chart carefully and recognize, using context, where substitutions have occurred.    Electronically signed by:  MD Fito Franco MD  11/14/24 0641    "

## 2024-11-14 NOTE — H&P
"H&P - Critical Care/ICU   Name: Gregg Partida 64 y.o. male I MRN: [id number]  Unit/Bed#: ED CT1 I Date of Admission: 11/14/2024   Date of Service: 11/14/2024 I Hospital Day: 0       Assessment & Plan  Atrial fibrillation with RVR ([facility])  Would begin oral metoprolol as mentation improves  Decrease diltiazem as able  If unable to take oral, transition Eliquis to heparin infusion  High anion gap metabolic acidosis  Suspect related to starvation ketosis  Begin insulin infusion with D5 containing fluid  Repeat labs in the afternoon  Thrombocytopenia ([facility])  Appears chronic  Follow daily  Electrolyte abnormality  Aggressively replete electrolytes  Recheck in afternoon  COPD (chronic obstructive pulmonary disease) ([facility])  Continue current inhaler/nebulizer regimen  Will begin steroids given oxygen need, wheezing on exam  Wean oxygen for goal SpO2>88%  Chronic heart failure with preserved ejection fraction ([facility])  Wt Readings from Last 3 Encounters:   09/19/24 86.2 kg (190 lb 1.6 oz)   09/16/24 83.3 kg (183 lb 10.3 oz)   08/16/24 85.7 kg (189 lb)     Daily weights  Hold home medications for now        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 disease ([facility])  Lab Results   Component Value Date    HGBA1C 5.4 08/15/2024       No results for input(s): \"POCGLU\" in the last 72 hours.    Blood Sugar Average: Last 72 hrs:    Will begin insulin infusion for now  Transition to sliding scale when anion gap closes  Acute kidney injury superimposed on chronic kidney disease  ([facility])  Lab Results   Component Value Date    EGFR 51 11/14/2024    EGFR 45 11/14/2024    EGFR 60 11/04/2024    CREATININE 1.44 (H) 11/14/2024    CREATININE 1.60 (H) 11/14/2024    CREATININE 1.27 11/04/2024     Continue volume resuscitation  Close intake and output  Daily weights  Trend serum creatinine  Alcohol abuse  Observe for signs of withdrawal  Favor transition to phenobarbital   Encourage cessation  Begin thiamine/folate  Ambulatory " dysfunction  PT/OT when appropriate  BPH (benign prostatic hyperplasia)  Continue home Flomax  Urinary retention protocol  Disposition: Stepdown Level 1    History of Present Illness   Gregg Partida is a 64 y.o. who presents on 11/14 with several days of shortness of breath, malaise, and intermittent chest pain. The patient is unsure of his past medical history. Chart review demonstrates a past medical history of coronary artery disease with history of stenting/coronary bypass, chronic obstructive pulmonary disease, atrial fibrillation on Eliquis, thrombocytopenia, type 2 diabetes, alcohol abuse, tobacco abuse, hypertension, and hyperlipidemia. The patient reports taking all medications prescribed, has not had alcohol in several weeks, and smokes two packs a day. In the emergency room, he was in atrial fibrillation with rapid ventricular response and intoxicated with an elevated ethanol level. He received Cardizem, initially with good effect, however became increasingly tachycardic, received metoprolol boluses with hypotension. He was given a total resuscitation of 4L crystalloid and albumin with transient effect. He will be transferred to the stepdown unit for further management.    History obtained from chart review and the patient.  Review of Systems: Review of Systems   Constitutional:  Positive for appetite change (decreased) and fatigue.   HENT:  Negative for trouble swallowing.    Respiratory:  Positive for shortness of breath (improving).    Cardiovascular:  Positive for chest pain (improving). Negative for palpitations and leg swelling.   Gastrointestinal:  Negative for diarrhea, nausea and vomiting.   Genitourinary:  Negative for difficulty urinating.   Musculoskeletal:  Positive for arthralgias and back pain.   Neurological:  Positive for weakness and light-headedness. Negative for dizziness and syncope.   Psychiatric/Behavioral:  Positive for confusion.        Historical Information   Past Medical  History:  No date: Acute on chronic kidney failure  ([facility])  06/07/2019: Alcohol withdrawal ([facility])  No date: Atrial fibrillation ([facility])  No date: Cancer ([facility])      Comment:  prostate ca,had radiation  No date: Cardiac disease      Comment:  stents,then triple bypass  No date: COPD (chronic obstructive pulmonary disease) ([facility])  No date: Coronary artery disease  No date: ETOH abuse  No date: Heart failure ([facility])  No date: History of heart surgery      Comment:  says triple bypass [facility]  No date: Hx of heart artery stent      Comment:  2014  No date: Hyperlipidemia  No date: Hypertension  10/17/2019: Hyponatremia  12/22/2019: Hypovolemic shock ([facility])  10/13/2022: Lumbar spondylitis ([facility])  04/21/2021: Metabolic acidosis, increased anion gap  10/10/2022: Nasal bone fracture  No date: Prostate CA ([facility])  No date: S/P CABG x 3      Comment:  2004  No date: Sleep apnea Past Surgical History:  No date: CARDIAC CATHETERIZATION      Comment:  2 stents  2004: CORONARY ARTERY BYPASS GRAFT  12/16/2020: MT ARTHRD ANT INTERBODY MIN DSC CRV BELOW C2; N/A      Comment:  Procedure: Anterior cervical discectomy with fusion                C4-C7; Posterior cervical decompression and fusion C2-T2;               Surgeon: David Rowell MD;  Location: [facility];                 Service: Neurosurgery  No date: TONSILLECTOMY   Current Outpatient Medications   Medication Instructions    albuterol (Ventolin HFA) 90 mcg/act inhaler 2 puffs, Inhalation, Every 4 hours PRN    aluminum-magnesium hydroxide-simethicone (MAALOX) 2758-4427-141 mg/30 mL suspension 30 mL, Oral, Every 4 hours PRN    apixaban (Eliquis) 5 mg TAKE 1 TABLET BY MOUTH 2 TIMES A DAY DO NOT START BEFORE JANUARY 31, 2024.    aspirin (ECOTRIN LOW STRENGTH) 81 mg, Oral, Daily    Blood Glucose Monitoring Suppl (ONE TOUCH ULTRA MINI) w/Device KIT Use as directed    Blood Pressure Monitoring (Adult Blood Pressure Cuff Lg) KIT Does not apply, Daily    Breo Ellipta 200-25 MCG/ACT  inhaler 1 puff, Inhalation, Daily, Rinse mouth after use.    ferrous sulfate 325 mg, Oral, Daily with breakfast    folic acid (FOLVITE) 1 mg tablet TAKE 1 TABLET DAILY    gabapentin (NEURONTIN) 300 mg capsule TAKE ONE CAPSULE THREE TIMES DAILY    lidocaine (Lidoderm) 5 % 1 patch, Topical, Daily, Remove & Discard patch within 12 hours or as directed by MD    magnesium Oxide (MAG-OX) 400 mg, Oral, 2 times daily    metFORMIN (GLUCOPHAGE) 500 mg, Daily with breakfast    metoprolol tartrate (LOPRESSOR) 50 mg, Oral, Every 12 hours    naltrexone (REVIA) 50 mg, Oral, Daily    naproxen (NAPROSYN) 500 mg, Oral, 2 times daily with meals    nicotine (NICODERM CQ) 21 mg/24 hr TD 24 hr patch 1 patch, Transdermal, Daily    ONETOUCH DELICA LANCETS FINE MISC 3 times daily, Test    pantoprazole (PROTONIX) 40 mg tablet TAKE 1 TABLET TWO TIMES A DAY    ranolazine (RANEXA) 500 mg 12 hr tablet TAKE 1 TABLET EVERY 12 HOURS    senna-docusate sodium (SENOKOT S) 8.6-50 mg per tablet 1 tablet, Oral, Daily PRN    tamsulosin (FLOMAX) 0.4 mg TAKE 1 CAPSULE DAILY    Thiamine HCl (vitamin B-1) 100 MG TABS TAKE 1 TABLET DAILY    No Known Allergies   Social History     Tobacco Use    Smoking status: Every Day     Current packs/day: 1.50     Average packs/day: 1.5 packs/day for 40.0 years (60.0 ttl pk-yrs)     Types: Cigarettes    Smokeless tobacco: Never   Vaping Use    Vaping status: Never Used   Substance Use Topics    Alcohol use: Yes     Alcohol/week: 4.0 standard drinks of alcohol     Types: 4 Standard drinks or equivalent per week     Comment: quart of vodka daily    Drug use: No    Family History   Problem Relation Age of Onset    Diabetes Mother     Uterine cancer Mother     COPD Father     Hypertension Father           Objective :                   Vitals I/O      Most Recent Min/Max in 24hrs   Temp 97.7 °F (36.5 °C) Temp  Min: 97.7 °F (36.5 °C)  Max: 97.7 °F (36.5 °C)   Pulse 75 Pulse  Min: 72  Max: 149   Resp (!) 33 Resp  Min: 16  Max: 33    /65 BP  Min: 82/51  Max: 123/70   O2 Sat 98 % SpO2  Min: 91 %  Max: 98 %      Intake/Output Summary (Last 24 hours) at 11/14/2024 0649  Last data filed at 11/14/2024 0628  Gross per 24 hour   Intake 1554.17 ml   Output --   Net 1554.17 ml       Diet NPO; Sips with meds    Invasive Monitoring           Physical Exam   Physical Exam  Eyes:      Pupils: Pupils are equal, round, and reactive to light.   Skin:     General: Skin is warm and dry.   HENT:      Head: Normocephalic and atraumatic.      Mouth/Throat:      Mouth: Mucous membranes are dry.      Dentition: Abnormal dentition.   Cardiovascular:      Rate and Rhythm: Tachycardia present. Rhythm irregular.      Pulses: Normal pulses.   Musculoskeletal:         General: No tenderness, deformity or signs of injury.      Right lower leg: No edema.      Left lower leg: No edema.   Abdominal: General: There is no distension.      Palpations: Abdomen is soft.      Tenderness: There is no abdominal tenderness.   Constitutional:       General: He is not in acute distress.     Appearance: He is ill-appearing.      Interventions: Nasal cannula in place.   Pulmonary:      Effort: Pulmonary effort is normal.      Breath sounds: Wheezing present.   Neurological:      GCS: GCS eye subscore is 3. GCS verbal subscore is 4. GCS motor subscore is 6.          Diagnostic Studies        Lab Results: I have reviewed the following results:     Medications:  Scheduled PRN   apixaban, 5 mg, BID  calcium gluconate, 1 g, Once  ferrous sulfate, 325 mg, Daily With Breakfast  fluticasone-vilanterol, 1 puff, Daily  folic acid, 1,000 mcg, Daily  gabapentin, 300 mg, Daily  ipratropium-albuterol, 3 mL, Once  magnesium sulfate, 2 g, Once  magnesium sulfate, 2 g, Once  nicotine, 1 patch, Daily  pantoprazole, 40 mg, BID AC  potassium chloride, 20 mEq, Q2H  tamsulosin, 0.4 mg, Daily  vitamin B-1, 100 mg, Daily      acetaminophen, 650 mg, Q6H PRN  levalbuterol, 1.25 mg, Q8H PRN  senna-docusate  sodium, 1 tablet, Daily PRN  trimethobenzamide, 200 mg, Q6H PRN       Continuous    dextrose 5% lactated ringer's, 125 mL/hr  diltiazem, 15 mg/hr, Last Rate: 15 mg/hr (11/14/24 0622)  insulin regular (HumuLIN R,NovoLIN R) 1 Units/mL in sodium chloride 0.9 % 100 mL infusion, 0.3-21 Units/hr         Labs:   CBC    Recent Labs     11/14/24 0032   WBC 7.00   HGB 12.6   HCT 36.6   PLT 96*     BMP    Recent Labs     11/14/24 0032 11/14/24 0226   SODIUM 130* 130*   K 3.7 3.5   CL 89* 96   CO2 20* 17*   AGAP 21* 17*   BUN 37* 35*   CREATININE 1.60* 1.44*   CALCIUM 7.6* 6.4*       Coags    Recent Labs     11/14/24 0053   INR 1.13        Additional Electrolytes  Recent Labs     11/14/24 0226 11/14/24 0419   MG 1.1*  --    CAIONIZED  --  0.85*          Blood Gas    No recent results  Recent Labs     11/14/24 0438   PHVEN 7.291*   [id number] 38.3*   PO2VEN 56.0*   WCN5LIE 18.0*   BEVEN -7.9   [id number] 79.8    LFTs  Recent Labs     11/14/24 0032 11/14/24 0226   ALT 46 35   AST 82* 63*   ALKPHOS 133* 102   ALB 3.7 3.0*   TBILI 0.74 0.54       Infectious  No recent results  Glucose  Recent Labs     11/14/24 0032 11/14/24 0226   GLUC 71 197*

## 2024-11-14 NOTE — ASSESSMENT & PLAN NOTE
Chronic, stable, presenting with cough that patient reports is at baseline - noting he is non compliant with home inhalers  Patient received 2 doses of p.o. prednisone 40 mg    Plan:  -Resume home Breo  -Xopenex PRN for SOB, wheezing

## 2024-11-14 NOTE — QUICK NOTE
Critical Care Interval Transfer Note:    Please refer to progress note from earlier today for full details.     Barriers to discharge:   Alcohol withdrawal  Atrial fibrillation with rapid ventricular response  Electrolyte abnormalities     Consults: INPATIENT CONSULT TO IR  IP CONSULT TO SURGICAL CRITICAL CARE  IP CONSULT TO PODIATRY  IP CONSULT TO CASE MANAGEMENT  IP CONSULT TO NUTRITION SERVICES  IP CONSULT TO CARDIOLOGY    Recommended to review admission imaging for incidental findings and document in discharge navigator: Chart reviewed, no known incidental findings noted at this time.      Discharge Plan: Anticipate discharge in 48 hrs to discharge location to be determined pending rehab evaluations.  Central access plan:  no central access    PT Recommendations: Post acute rehabilitation services  OT Recommendations: Post acute rehabilitation services      Patient seen and evaluated by Critical Care today and deemed to be appropriate for transfer to Med Surg with Telemetry. Spoke to Dr. Esquivel from family medicine to accept transfer. Critical care can be contacted via [facility] with any questions or concerns.

## 2024-11-14 NOTE — ASSESSMENT & PLAN NOTE
Chronic, home medications include metformin 500 mg QD. Hba1c 5.2.     Plan:  -Hold metformin   -Insulin sliding scale, hypoglycemia protocol  -Carb controlled diet

## 2024-11-14 NOTE — ASSESSMENT & PLAN NOTE
Presented to ED with afib RVR, HR > 140 sustained. EKG afib RVR with 's. Patient noting he has not been compliant with home eliquis or lopressor 50 mg BID.    In ED: s/p lopressor 5 mg x 2, cardizem 15 mg x 1  11/15 OVN: Cardizem x2 Lopressor x1    Plan:  - Cardiology consulted, appreciate recs  - Cardizem gtt  - continue home lopressor   -Continue to monitor for rate control and BP  -Restart home Eliquis 5 mg BID  -telemetry

## 2024-11-14 NOTE — ASSESSMENT & PLAN NOTE
Presenting with acute intoxication and noting 4 day binge of significant alcohol use. Hx of several admissions for similar complaints in the past. Unable to assess compliance with home naltrexone     Ethanol on admission: 381    Plan:  -CIWA protocol   -IVF  -Seizure precautions, fall precautions, aspiration precautions  -Tigan PRN for nausea   -Restart thiamine, folate

## 2024-11-14 NOTE — ASSESSMENT & PLAN NOTE
Wt Readings from Last 3 Encounters:   09/19/24 86.2 kg (190 lb 1.6 oz)   09/16/24 83.3 kg (183 lb 10.3 oz)   08/16/24 85.7 kg (189 lb)     Daily weights  Hold home medications for now

## 2024-11-14 NOTE — ASSESSMENT & PLAN NOTE
Suspect related to starvation ketosis  Begin insulin infusion with D5 containing fluid  Repeat labs in the afternoon

## 2024-11-15 ENCOUNTER — PATIENT OUTREACH (OUTPATIENT)
Age: 64
End: 2024-11-15

## 2024-11-15 ENCOUNTER — APPOINTMENT (INPATIENT)
Dept: RADIOLOGY | Facility: HOSPITAL | Age: 64
DRG: 308 | End: 2024-11-15
Payer: COMMERCIAL

## 2024-11-15 LAB
ALBUMIN SERPL BCG-MCNC: 3.1 G/DL (ref 3.5–5)
ALP SERPL-CCNC: 112 U/L (ref 34–104)
ALT SERPL W P-5'-P-CCNC: 44 U/L (ref 7–52)
ANION GAP SERPL CALCULATED.3IONS-SCNC: 7 MMOL/L (ref 4–13)
AST SERPL W P-5'-P-CCNC: 86 U/L (ref 13–39)
ATRIAL RATE: 320 BPM
ATRIAL RATE: 326 BPM
BASOPHILS # BLD AUTO: 0.01 THOUSANDS/ÂΜL (ref 0–0.1)
BASOPHILS NFR BLD AUTO: 0 % (ref 0–1)
BILIRUB SERPL-MCNC: 0.89 MG/DL (ref 0.2–1)
BNP SERPL-MCNC: 1150 PG/ML (ref 0–100)
BUN SERPL-MCNC: 22 MG/DL (ref 5–25)
CA-I BLD-SCNC: 1.08 MMOL/L (ref 1.12–1.32)
CALCIUM ALBUM COR SERPL-MCNC: 8.7 MG/DL (ref 8.3–10.1)
CALCIUM SERPL-MCNC: 8 MG/DL (ref 8.4–10.2)
CARDIAC TROPONIN I PNL SERPL HS: 31 NG/L (ref 8–18)
CHLORIDE SERPL-SCNC: 98 MMOL/L (ref 96–108)
CO2 SERPL-SCNC: 26 MMOL/L (ref 21–32)
CREAT SERPL-MCNC: 0.91 MG/DL (ref 0.6–1.3)
EOSINOPHIL # BLD AUTO: 0.01 THOUSAND/ÂΜL (ref 0–0.61)
EOSINOPHIL NFR BLD AUTO: 0 % (ref 0–6)
ERYTHROCYTE [DISTWIDTH] IN BLOOD BY AUTOMATED COUNT: 16 % (ref 11.6–15.1)
GFR SERPL CREATININE-BSD FRML MDRD: 90 ML/MIN/1.73SQ M
GLUCOSE SERPL-MCNC: 127 MG/DL (ref 65–140)
GLUCOSE SERPL-MCNC: 135 MG/DL (ref 65–140)
GLUCOSE SERPL-MCNC: 185 MG/DL (ref 65–140)
GLUCOSE SERPL-MCNC: 217 MG/DL (ref 65–140)
GLUCOSE SERPL-MCNC: 225 MG/DL (ref 65–140)
HCT VFR BLD AUTO: 30.5 % (ref 36.5–49.3)
HGB BLD-MCNC: 10.4 G/DL (ref 12–17)
IMM GRANULOCYTES # BLD AUTO: 0.08 THOUSAND/UL (ref 0–0.2)
IMM GRANULOCYTES NFR BLD AUTO: 1 % (ref 0–2)
LYMPHOCYTES # BLD AUTO: 0.7 THOUSANDS/ÂΜL (ref 0.6–4.47)
LYMPHOCYTES NFR BLD AUTO: 11 % (ref 14–44)
MAGNESIUM SERPL-MCNC: 1.8 MG/DL (ref 1.9–2.7)
MCH RBC QN AUTO: 33 PG (ref 26.8–34.3)
MCHC RBC AUTO-ENTMCNC: 34.1 G/DL (ref 31.4–37.4)
MCV RBC AUTO: 97 FL (ref 82–98)
MONOCYTES # BLD AUTO: 1.19 THOUSAND/ÂΜL (ref 0.17–1.22)
MONOCYTES NFR BLD AUTO: 19 % (ref 4–12)
MRSA NOSE QL CULT: NORMAL
NEUTROPHILS # BLD AUTO: 4.15 THOUSANDS/ÂΜL (ref 1.85–7.62)
NEUTS SEG NFR BLD AUTO: 69 % (ref 43–75)
NRBC BLD AUTO-RTO: 0 /100 WBCS
P AXIS: 245 DEGREES
P AXIS: 251 DEGREES
PLATELET # BLD AUTO: 60 THOUSANDS/UL (ref 149–390)
PMV BLD AUTO: 11 FL (ref 8.9–12.7)
POTASSIUM SERPL-SCNC: 3.9 MMOL/L (ref 3.5–5.3)
PROT SERPL-MCNC: 5.7 G/DL (ref 6.4–8.4)
QRS AXIS: 48 DEGREES
QRS AXIS: 78 DEGREES
QRSD INTERVAL: 88 MS
QRSD INTERVAL: 94 MS
QT INTERVAL: 288 MS
QT INTERVAL: 322 MS
QTC INTERVAL: 467 MS
QTC INTERVAL: 526 MS
RBC # BLD AUTO: 3.15 MILLION/UL (ref 3.88–5.62)
SODIUM SERPL-SCNC: 131 MMOL/L (ref 135–147)
T WAVE AXIS: -52 DEGREES
T WAVE AXIS: -53 DEGREES
VENTRICULAR RATE: 158 BPM
VENTRICULAR RATE: 160 BPM
WBC # BLD AUTO: 6.14 THOUSAND/UL (ref 4.31–10.16)

## 2024-11-15 PROCEDURE — 82330 ASSAY OF CALCIUM: CPT

## 2024-11-15 PROCEDURE — 97163 PT EVAL HIGH COMPLEX 45 MIN: CPT

## 2024-11-15 PROCEDURE — 80053 COMPREHEN METABOLIC PANEL: CPT | Performed by: NURSE PRACTITIONER

## 2024-11-15 PROCEDURE — 99232 SBSQ HOSP IP/OBS MODERATE 35: CPT | Performed by: FAMILY MEDICINE

## 2024-11-15 PROCEDURE — 93005 ELECTROCARDIOGRAM TRACING: CPT

## 2024-11-15 PROCEDURE — 90673 RIV3 VACCINE NO PRESERV IM: CPT | Performed by: NURSE PRACTITIONER

## 2024-11-15 PROCEDURE — 97110 THERAPEUTIC EXERCISES: CPT

## 2024-11-15 PROCEDURE — 82948 REAGENT STRIP/BLOOD GLUCOSE: CPT

## 2024-11-15 PROCEDURE — 97167 OT EVAL HIGH COMPLEX 60 MIN: CPT

## 2024-11-15 PROCEDURE — 84484 ASSAY OF TROPONIN QUANT: CPT

## 2024-11-15 PROCEDURE — G0008 ADMIN INFLUENZA VIRUS VAC: HCPCS | Performed by: NURSE PRACTITIONER

## 2024-11-15 PROCEDURE — 83880 ASSAY OF NATRIURETIC PEPTIDE: CPT

## 2024-11-15 PROCEDURE — 99223 1ST HOSP IP/OBS HIGH 75: CPT | Performed by: INTERNAL MEDICINE

## 2024-11-15 PROCEDURE — 85025 COMPLETE CBC W/AUTO DIFF WBC: CPT | Performed by: NURSE PRACTITIONER

## 2024-11-15 PROCEDURE — 83735 ASSAY OF MAGNESIUM: CPT | Performed by: NURSE PRACTITIONER

## 2024-11-15 PROCEDURE — 93010 ELECTROCARDIOGRAM REPORT: CPT | Performed by: INTERNAL MEDICINE

## 2024-11-15 PROCEDURE — 71045 X-RAY EXAM CHEST 1 VIEW: CPT

## 2024-11-15 RX ORDER — LORAZEPAM 1 MG/1
2 TABLET ORAL ONCE
Status: COMPLETED | OUTPATIENT
Start: 2024-11-15 | End: 2024-11-15

## 2024-11-15 RX ORDER — DILTIAZEM HYDROCHLORIDE 5 MG/ML
20 INJECTION INTRAVENOUS ONCE
Status: COMPLETED | OUTPATIENT
Start: 2024-11-15 | End: 2024-11-15

## 2024-11-15 RX ORDER — FUROSEMIDE 10 MG/ML
20 INJECTION INTRAMUSCULAR; INTRAVENOUS ONCE
Status: COMPLETED | OUTPATIENT
Start: 2024-11-15 | End: 2024-11-15

## 2024-11-15 RX ORDER — POTASSIUM CHLORIDE 1500 MG/1
20 TABLET, EXTENDED RELEASE ORAL ONCE
Status: COMPLETED | OUTPATIENT
Start: 2024-11-15 | End: 2024-11-15

## 2024-11-15 RX ORDER — CALCIUM GLUCONATE 20 MG/ML
2 INJECTION, SOLUTION INTRAVENOUS ONCE
Status: COMPLETED | OUTPATIENT
Start: 2024-11-15 | End: 2024-11-15

## 2024-11-15 RX ORDER — LANOLIN ALCOHOL/MO/W.PET/CERES
400 CREAM (GRAM) TOPICAL 2 TIMES DAILY
Status: COMPLETED | OUTPATIENT
Start: 2024-11-15 | End: 2024-11-15

## 2024-11-15 RX ORDER — MAGNESIUM SULFATE 1 G/100ML
1 INJECTION INTRAVENOUS ONCE
Status: COMPLETED | OUTPATIENT
Start: 2024-11-15 | End: 2024-11-15

## 2024-11-15 RX ADMIN — APIXABAN 5 MG: 5 TABLET, FILM COATED ORAL at 08:03

## 2024-11-15 RX ADMIN — TAMSULOSIN HYDROCHLORIDE 0.4 MG: 0.4 CAPSULE ORAL at 08:02

## 2024-11-15 RX ADMIN — DILTIAZEM HYDROCHLORIDE 20 MG: 5 INJECTION INTRAVENOUS at 04:00

## 2024-11-15 RX ADMIN — POTASSIUM CHLORIDE 20 MEQ: 1500 TABLET, EXTENDED RELEASE ORAL at 07:57

## 2024-11-15 RX ADMIN — GABAPENTIN 300 MG: 300 CAPSULE ORAL at 08:03

## 2024-11-15 RX ADMIN — PREDNISONE 40 MG: 20 TABLET ORAL at 08:02

## 2024-11-15 RX ADMIN — APIXABAN 5 MG: 5 TABLET, FILM COATED ORAL at 17:00

## 2024-11-15 RX ADMIN — Medication 400 MG: at 08:03

## 2024-11-15 RX ADMIN — FLUTICASONE FUROATE AND VILANTEROL TRIFENATATE 1 PUFF: 200; 25 POWDER RESPIRATORY (INHALATION) at 08:04

## 2024-11-15 RX ADMIN — INFLUENZA A VIRUS A/WEST VIRGINIA/30/2022 (H1N1) RECOMBINANT HEMAGGLUTININ ANTIGEN, INFLUENZA A VIRUS A/MASSACHUSETTS/18/2022 (H3N2) RECOMBINANT HEMAGGLUTININ ANTIGEN, AND INFLUENZA B VIRUS B/AUSTRIA/1359417/2021 RECOMBINANT HEMAGGLUTININ ANTIGEN 0.5 ML: 45; 45; 45 INJECTION INTRAMUSCULAR at 08:10

## 2024-11-15 RX ADMIN — DILTIAZEM HYDROCHLORIDE 5 MG/HR: 5 INJECTION, SOLUTION INTRAVENOUS at 07:40

## 2024-11-15 RX ADMIN — FUROSEMIDE 20 MG: 10 INJECTION, SOLUTION INTRAMUSCULAR; INTRAVENOUS at 10:36

## 2024-11-15 RX ADMIN — METOPROLOL TARTRATE 50 MG: 50 TABLET, FILM COATED ORAL at 08:03

## 2024-11-15 RX ADMIN — INSULIN LISPRO 2 UNITS: 100 INJECTION, SOLUTION INTRAVENOUS; SUBCUTANEOUS at 12:07

## 2024-11-15 RX ADMIN — FERROUS SULFATE TAB 325 MG (65 MG ELEMENTAL FE) 325 MG: 325 (65 FE) TAB at 07:57

## 2024-11-15 RX ADMIN — METOPROLOL TARTRATE 50 MG: 50 TABLET, FILM COATED ORAL at 22:07

## 2024-11-15 RX ADMIN — LORAZEPAM 2 MG: 1 TABLET ORAL at 07:54

## 2024-11-15 RX ADMIN — PANTOPRAZOLE SODIUM 40 MG: 40 TABLET, DELAYED RELEASE ORAL at 07:57

## 2024-11-15 RX ADMIN — MAGNESIUM SULFATE HEPTAHYDRATE 1 G: 1 INJECTION, SOLUTION INTRAVENOUS at 04:00

## 2024-11-15 RX ADMIN — GABAPENTIN 300 MG: 300 CAPSULE ORAL at 17:00

## 2024-11-15 RX ADMIN — THIAMINE HCL TAB 100 MG 100 MG: 100 TAB at 08:03

## 2024-11-15 RX ADMIN — CALCIUM GLUCONATE 2 G: 20 INJECTION, SOLUTION INTRAVENOUS at 06:40

## 2024-11-15 RX ADMIN — INSULIN LISPRO 2 UNITS: 100 INJECTION, SOLUTION INTRAVENOUS; SUBCUTANEOUS at 16:58

## 2024-11-15 RX ADMIN — INSULIN LISPRO 1 UNITS: 100 INJECTION, SOLUTION INTRAVENOUS; SUBCUTANEOUS at 22:07

## 2024-11-15 RX ADMIN — LORAZEPAM 2 MG: 1 TABLET ORAL at 23:52

## 2024-11-15 RX ADMIN — PANTOPRAZOLE SODIUM 40 MG: 40 TABLET, DELAYED RELEASE ORAL at 17:00

## 2024-11-15 RX ADMIN — GABAPENTIN 300 MG: 300 CAPSULE ORAL at 22:07

## 2024-11-15 RX ADMIN — Medication 400 MG: at 17:00

## 2024-11-15 RX ADMIN — LORAZEPAM 2 MG: 1 TABLET ORAL at 18:41

## 2024-11-15 RX ADMIN — DILTIAZEM HYDROCHLORIDE 20 MG: 5 INJECTION, SOLUTION INTRAVENOUS at 00:20

## 2024-11-15 RX ADMIN — FOLIC ACID 1000 MCG: 1 TABLET ORAL at 08:03

## 2024-11-15 RX ADMIN — DILTIAZEM HYDROCHLORIDE 10 MG/HR: 5 INJECTION, SOLUTION INTRAVENOUS at 20:49

## 2024-11-15 NOTE — PROGRESS NOTES
Progress Note - Family Medicine   Name: Gregg Partida 64 y.o. male I MRN: [id number]  Unit/Bed#: 4 [place] 418-01 I Date of Admission: 11/14/2024   Date of Service: 11/15/2024 I Hospital Day: 1     Assessment & Plan  Atrial fibrillation with RVR (HCC)  Presented to ED with afib RVR, HR > 140 sustained. EKG afib RVR with 's. Patient noting he has not been compliant with home eliquis or lopressor 50 mg BID.    In ED: s/p lopressor 5 mg x 2, cardizem 15 mg x 1  11/15 OVN: Cardizem x2 Lopressor x1    Plan:  - Cardiology consulted, appreciate recs  - Cardizem gtt  - continue home lopressor   -Continue to monitor for rate control and BP  -Restart home Eliquis 5 mg BID  -telemetry   COPD (chronic obstructive pulmonary disease) (HCC)  Chronic, stable, presenting with cough that patient reports is at baseline - noting he is non compliant with home inhalers  Patient received 2 doses of p.o. prednisone 40 mg    Plan:  -Resume home Breo  -Xopenex PRN for SOB, wheezing  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 disease (HCC)  Chronic, home medications include metformin 500 mg QD. Hba1c 5.2.     Plan:  -Hold metformin   -Insulin sliding scale, hypoglycemia protocol  -Carb controlled diet   Thrombocytopenia (HCC)  Plt 96 on admission, similar to baseline. Suspect 2/2 chronic alcohol abuse.    Plan:  -Hold ASA  -Monitor for bleeding  -Continue home eliquis  Electrolyte abnormality  Na 130, Cl 89, bicarb 20, BUN 37, Ca 7.6 in setting of chronic alcohol abuse and starvation. Plan to monitor and replete as indicated   Chronic heart failure with preserved ejection fraction (HCC)  Chronic, appears hypovolemic on admission - no evidence of acute exacerbation. Home medications include lopressor 50 mg BID    BNP 1150    Plan:  Cardiology consulted,   Trial of 1 time dose of IV Lasix 40mg   Daily weights, I&O's  Continue Lopressor 50mg BID   Acute kidney injury superimposed on chronic kidney disease  (HCC)  Cr 1.60 on  admission, baseline 1 -1.2. Suspect secondary to dehydration, starvation acidosis, significant alcohol abuse.    Plan:  -Pending UA  -Urinary retention protocol   -Avoid hypotension   -IVF  -Daily weights, strict I&O's  -Avoid nephrotoxins as able   Alcohol abuse  Presenting with acute intoxication and noting 4 day binge of significant alcohol use. Hx of several admissions for similar complaints in the past. Unable to assess compliance with home naltrexone     Ethanol on admission: 381    Plan:  -CIWA protocol   -IVF  -Seizure precautions, fall precautions, aspiration precautions  -Tigan PRN for nausea   -Restart thiamine, folate  Ambulatory dysfunction  Chronic, noting hx of several falls in setting of chronic alcohol abuse - hematomas noted diffusely on skin. PT/OT ordered    CT head wo contrast: no acute intracranial abnormality  CT C-spine wo contrast: No acute cervical spine fracture or traumatic malalignment.  CT chest/abd/pelvis wo contrast: mildly displaced left lateral 7th rib fracture, no hemothorax/pneumothorax, no acute trauma in abdomen or pelvis   BPH (benign prostatic hyperplasia)  Chronic, continue home flomax 0.4 mg  Closed fracture of one rib of left side      VTE Pharmacologic Prophylaxis: VTE Score: 3 Moderate Risk (Score 3-4) - Pharmacological DVT Prophylaxis Ordered: apixaban (Eliquis).    Mobility:   Basic Mobility Inpatient Raw Score: 17  JH-HLM Goal: 5: Stand one or more mins  JH-HLM Achieved: 5: Stand (1 or more minutes)  JH-HLM Goal achieved. Continue to encourage appropriate mobility.    Patient Centered Rounds: I performed bedside rounds with nursing staff today.   Discussions with Specialists or Other Care Team Provider: Cardiology    Education and Discussions with Family / Patient: Patient declined call to .     Current Length of Stay: 1 day(s)  Current Patient Status: Inpatient   Certification Statement: The patient will continue to require additional inpatient hospital  stay due to acute alcohol withdrawal and A-fib with RVR  Discharge Plan: Anticipate discharge in 24-48 hrs to home.    Code Status: Level 1 - Full Code    Subjective   Patient seen and examined at bedside this morning and on side rounds.  Overnight, patient was persistently in A-fib with RVR requiring multiple doses of beta-blockers and Cardizem.  Cardizem drip was reinitiated this morning.  His CIWA protocol was elevated this morning requiring Ativan.    Patient was requesting a different diet this morning.  He declined any nausea, vomiting, diarrhea, diaphoresis, anxiety.  Denies any auditory visual hallucinations.    Review of Systems   Constitutional: Negative.    HENT: Negative.     Eyes: Negative.  Negative for pain and visual disturbance.   Respiratory:  Negative for cough and shortness of breath.    Cardiovascular:  Negative for chest pain, palpitations and leg swelling.   Gastrointestinal:  Negative for abdominal pain, constipation, diarrhea, nausea and vomiting.   Endocrine: Negative.    Genitourinary: Negative.    Allergic/Immunologic: Negative.    Neurological: Negative.    Hematological: Negative.    Psychiatric/Behavioral: Negative.     All other systems reviewed and are negative.        Objective :  Temp:  [98 °F (36.7 °C)-99 °F (37.2 °C)] 98.5 °F (36.9 °C)  HR:  [] 74  BP: (110-163)/() 110/66  Resp:  [18-19] 18  SpO2:  [89 %-97 %] 93 %  O2 Device: Nasal cannula  Nasal Cannula O2 Flow Rate (L/min):  [2 L/min] 2 L/min    Body mass index is 23.75 kg/m².     Input and Output Summary (last 24 hours):     Intake/Output Summary (Last 24 hours) at 11/15/2024 1333  Last data filed at 11/15/2024 1140  Gross per 24 hour   Intake 1000 ml   Output 1000 ml   Net 0 ml       Physical Exam  Vitals and nursing note reviewed.   Constitutional:       General: He is not in acute distress.     Appearance: He is well-developed.   HENT:      Head: Normocephalic and atraumatic.   Eyes:      Conjunctiva/sclera:  Conjunctivae normal.   Cardiovascular:      Rate and Rhythm: Tachycardia present. Rhythm irregular.      Heart sounds: No murmur heard.  Pulmonary:      Effort: Pulmonary effort is normal. No respiratory distress.      Breath sounds: Normal breath sounds. No wheezing.      Comments: Decreased breath sounds.  Abdominal:      Palpations: Abdomen is soft.      Tenderness: There is no abdominal tenderness.   Musculoskeletal:         General: No swelling.      Cervical back: Neck supple.   Skin:     General: Skin is warm and dry.      Capillary Refill: Capillary refill takes less than 2 seconds.   Neurological:      Mental Status: He is alert.   Psychiatric:         Mood and Affect: Mood normal.         Lines/Drains:      Telemetry:  Telemetry Orders (From admission, onward)               24 Hour Telemetry Monitoring  Continuous x 24 Hours (Telem)        Question:  Reason for 24 Hour Telemetry  Answer:  Arrhythmias requiring acute medical intervention / PPM or ICD malfunction                     Telemetry Reviewed: Atrial fibrillation with Rapid Ventricular Rate  Indication for Continued Telemetry Use: Arrthymias requiring medical therapy               Lab Results: I have reviewed the following results:   Results from last 7 days   Lab Units 11/15/24  0623   WBC Thousand/uL 6.14   HEMOGLOBIN g/dL 10.4*   HEMATOCRIT % 30.5*   PLATELETS Thousands/uL 60*   SEGS PCT % 69   LYMPHO PCT % 11*   MONO PCT % 19*   EOS PCT % 0     Results from last 7 days   Lab Units 11/15/24  0623   SODIUM mmol/L 131*   POTASSIUM mmol/L 3.9   CHLORIDE mmol/L 98   CO2 mmol/L 26   BUN mg/dL 22   CREATININE mg/dL 0.91   ANION GAP mmol/L 7   CALCIUM mg/dL 8.0*   ALBUMIN g/dL 3.1*   TOTAL BILIRUBIN mg/dL 0.89   ALK PHOS U/L 112*   ALT U/L 44   AST U/L 86*   GLUCOSE RANDOM mg/dL 127     Results from last 7 days   Lab Units 11/14/24  0053   INR  1.13     Results from last 7 days   Lab Units 11/15/24  1120 11/15/24  0719 11/14/24  2228 11/14/24  1544  11/14/24  1410 11/14/24  1214 11/14/24  1007 11/14/24  0914 11/14/24  0710   POC GLUCOSE mg/dl 217* 135 123 170* 150* 139 89 82 87     Results from last 7 days   Lab Units 11/14/24  0032   HEMOGLOBIN A1C % 5.2     Results from last 7 days   Lab Units 11/14/24  0438   LACTIC ACID mmol/L 0.8       Recent Cultures (last 7 days):         Imaging Results Review: I reviewed radiology reports from this admission including: chest xray.  Other Study Results Review: No additional pertinent studies reviewed.    Last 24 Hours Medication List:     Current Facility-Administered Medications:     acetaminophen (TYLENOL) tablet 650 mg, Q6H PRN    apixaban (ELIQUIS) tablet 5 mg, BID    diltiazem (CARDIZEM) 125 mg in sodium chloride 0.9 % 125 mL infusion, Titrated, Last Rate: 7.5 mg/hr (11/15/24 0840)    ferrous sulfate tablet 325 mg, Daily With Breakfast    fluticasone-vilanterol 200-25 mcg/actuation 1 puff, Daily    folic acid (FOLVITE) tablet 1,000 mcg, Daily    gabapentin (NEURONTIN) capsule 300 mg, TID    insulin lispro (HumALOG/ADMELOG) 100 units/mL subcutaneous injection 1-6 Units, 4x Daily (AC & HS) **AND** Fingerstick Glucose (POCT), 4x Daily AC and at bedtime    levalbuterol (XOPENEX) inhalation solution 1.25 mg, Q8H PRN    magnesium Oxide (MAG-OX) tablet 400 mg, BID    metoprolol tartrate (LOPRESSOR) tablet 50 mg, Q12H SEDA    nicotine (NICODERM CQ) 14 mg/24hr TD 24 hr patch 1 patch, Daily    pantoprazole (PROTONIX) EC tablet 40 mg, BID AC    senna-docusate sodium (SENOKOT S) 8.6-50 mg per tablet 1 tablet, Daily PRN    tamsulosin (FLOMAX) capsule 0.4 mg, Daily    thiamine tablet 100 mg, Daily    trimethobenzamide (TIGAN) IM injection 200 mg, Q6H PRN    Administrative Statements   Today, Patient Was Seen By: Pamela Esquivel MD    **Please Note: This note may have been constructed using a voice recognition system.**

## 2024-11-15 NOTE — ASSESSMENT & PLAN NOTE
Lab Results   Component Value Date    HGBA1C 5.2 11/14/2024       Recent Labs     11/14/24  1410 11/14/24  1544 11/14/24  2228 11/15/24  0719   POCGLU 150* 170* 123 135       Blood Sugar Average: Last 72 hrs:  (P) 121.875  managed per primary team

## 2024-11-15 NOTE — PHYSICAL THERAPY NOTE
PHYSICAL THERAPY EVALUATION/TREATMENT     11/15/24 1450   PT Last Visit   PT Visit Date 11/15/24   Note Type   Note type Evaluation   Pain Assessment   Pain Assessment Tool 0-10   Pain Score 5  (Left rib and LS area(patient with remote history of left rib fracture))   Restrictions/Precautions   Weight Bearing Precautions Per Order No   Other Precautions Chair Alarm;Bed Alarm;Fall Risk;Pain;O2   Home Living   Type of Home Apartment   Home Layout One level;Elevator   Home Equipment Walker;Cane;Other (Comment)  (Rollator and roller)   Additional Comments Patient using rollator in the home and roller walker out of the home   Prior Function   Level of Mobile Independent with ADLs;Independent with functional mobility   Lives With Alone   Receives Help From Friend(s);Neighbor   Falls in the last 6 months 1 to 4   Comments Patient states typically independent in his home although recently struggling to maintain cleanliness in the home difficulty with meal preparation with a number of falls in his recent history   General   Additional Pertinent History Chart reviewed, patient admitted with A-fib, RVR, alcohol intoxication.  Patient now presents as generally weak with decreased coordination and resulting gait dysfunction   Family/Caregiver Present No   Cognition   Overall Cognitive Status WFL   Arousal/Participation Cooperative   Orientation Level Oriented X4   Following Commands Follows multistep commands with increased time or repetition   Comments Patient distracted at times although able to follow commands   Subjective   Subjective Patient reports having back pain since his last fall   RLE Assessment   RLE Assessment   (Range of motion within functional limits, strength 3+/5, except right dorsiflexion 2 -/5)   LLE Assessment   LLE Assessment   (Range of motion within functional limits, strength 3+/5)   Coordination   Movements are Fluid and Coordinated 0   Coordination and Movement Description Decreased  coordination with transfers and gait activity   Bed Mobility   Supine to Sit 4  Minimal assistance   Additional items Assist x 1   Sit to Supine 5  Supervision   Transfers   Sit to Stand 4  Minimal assistance   Additional items Assist x 1   Stand to Sit 4  Minimal assistance   Additional items Assist x 1   Ambulation/Elevation   Gait Assistance 4  Minimal assist   Additional items Assist x 1;Verbal cues;Tactile cues   Assistive Device Rolling walker   Distance 10 feet with change in direction, shortened stride length decreased coordination bilaterally.  Steppage type gait patterning with right foot drop   Balance   Static Sitting Fair   Dynamic Sitting Fair -   Static Standing Fair -   Dynamic Standing Fair -   Ambulatory Fair -   Activity Tolerance   Activity Tolerance Patient limited by fatigue;Patient limited by pain;Treatment limited secondary to medical complications (Comment)   Nurse Made Aware Yes   Assessment   Prognosis Good   Problem List Decreased strength;Decreased range of motion;Decreased endurance;Impaired balance;Decreased mobility;Decreased coordination;Decreased safety awareness;Pain   Assessment Patient seen for Physical Therapy evaluation. Patient admitted with Atrial fibrillation with RVR ([facility]).  Comorbidities affecting patient's physical performance include: Alcohol intoxication, A-fib with RVR.  Personal factors affecting patient at time of initial evaluation include: ambulating with assistive device, inability to ambulate household distances, inability to navigate community distances, inability to navigate level surfaces without external assistance, inability to perform dynamic tasks in community, limited home support, positive fall history, inability to perform physical activity, inability to perform ADLS, and inability to perform IADLS . Prior to admission, patient was independent with functional mobility with walker, independent with ADLS, requiring assist for IADLS, ambulating household  distance, and ambulating community distances.  Please find objective findings from Physical Therapy assessment regarding body systems outlined above with impairments and limitations including weakness, decreased ROM, impaired balance, decreased endurance, impaired coordination, gait deviations, decreased activity tolerance, decreased functional mobility tolerance, fall risk, and SOB upon exertion.  The Barthel Index was used as a functional outcome tool presenting with a score of Barthel Index Score: 45 today indicating marked limitations of functional mobility and ADLS.  Patient's clinical presentation is currently unstable/unpredictable as seen in patient's presentation of vital sign response, changing level of pain, varying levels of cognitive performance, increased fall risk, new onset of impairment of functional mobility, decreased endurance, and new onset of weakness. Pt would benefit from continued Physical Therapy treatment to address deficits as defined above and maximize level of functional mobility. As demonstrated by objective findings, the assigned level of complexity for this evaluation is high.The patient's -[facility] Basic Mobility Inpatient Short Form Raw Score is 17. A Raw score of greater than 16 suggests the patient may benefit from discharge to home. Please also refer to the recommendation of the Physical Therapist for safe discharge planning.   Goals   Patient Goals To feel better and go home   STG Expiration Date 11/22/24   Short Term Goal #1 Transfers and gait with roller walker with supervision to independent   Short Term Goal #2 Gait endurance to functional household distances   LTG Expiration Date 11/29/24   Long Term Goal #1 Strength bilateral lower extremities 4/5   Long Term Goal #2 Independent transfers and gait with roller walker for functional community distances as prior to admission   Plan   Treatment/Interventions Functional transfer training;LE strengthening/ROM;Therapeutic  exercise;Endurance training;Patient/family training;Equipment eval/education;Bed mobility;Gait training;Compensatory technique education   PT Frequency 3-5x/wk   Discharge Recommendation   Rehab Resource Intensity Level, PT III (Minimum Resource Intensity)   AM-PAC Basic Mobility Inpatient   Turning in Flat Bed Without Bedrails 3   Lying on Back to Sitting on Edge of Flat Bed Without Bedrails 3   Moving Bed to Chair 3   Standing Up From Chair Using Arms 3   Walk in Room 3   Climb 3-5 Stairs With Railing 2   Basic Mobility Inpatient Raw Score 17   Basic Mobility Standardized Score 39.67   [facility] Highest Level Of Mobility   -[facility] Goal 5: Stand one or more mins   -HL Achieved 6: Walk 10 steps or more   Barthel Index   Feeding 10   Bathing 0   Grooming Score 0   Dressing Score 5   Bladder Score 5   Bowels Score 5   Toilet Use Score 5   Transfers (Bed/Chair) Score 10   Mobility (Level Surface) Score 0   Stairs Score 5   Barthel Index Score 45   Additional Treatment Session   Start Time 1435   End Time 1450   Treatment Assessment s: Patient reports new onset of back pain with last fall O: Bilateral lower extremity exercise completed as listed below A: Patient cooperative and pleasant demonstrating gait dysfunction with onset of weakness and pain.  Patient will benefit from continued physical therapy with progression as tolerated and increasing functional mobility with clinical staff as well   Exercises   Hip Flexion Sitting;10 reps;Bilateral  (Alternating)   Hip Abduction Sitting;10 reps;Bilateral  (Alternating)   Knee AROM Long Arc Quad Sitting;10 reps;Bilateral  (Alternating)   Ankle Pumps Sitting;20 reps;Bilateral   Balance training  Sidestepping and backward stepping completed for balance and coordination   Licensure   [place] License Number  Heather Pederson, PT 4 [phone] [id number] [id number]

## 2024-11-15 NOTE — OCCUPATIONAL THERAPY NOTE
OT EVALUATION       11/15/24 1540   OT Last Visit   OT Visit Date 11/15/24   Note Type   Note type Evaluation   Pain Assessment   Pain Assessment Tool 0-10   Pain Score 6   Pain Location/Orientation Location: Back   Restrictions/Precautions   Other Precautions Chair Alarm;Bed Alarm;Fall Risk;O2   Home Living   Type of Home Apartment   Home Layout One level;Elevator   Bathroom Shower/Tub Tub/shower unit   Bathroom Toilet Standard   Bathroom Equipment Grab bars in shower   Home Equipment Cane;Walker  (rollator)   Additional Comments Patient using rollator in the home and roller walker out of the home   Prior Function   Level of [place] Independent with ADLs;Independent with functional mobility   Lives With Alone   Receives Help From Friend(s);Neighbor   Falls in the last 6 months 1 to 4   Comments Patient states typically independent in his home although recently struggling to maintain cleanliness in the home difficulty with meal preparation with a number of falls in his recent history   General   Additional Pertinent History alcohol abuse   Additional General Comments Patient admitted with SOB and chest pain   ADL   Eating Assistance 7  Independent   Grooming Assistance 7  Independent   UB Bathing Assistance 5  Supervision/Setup   LB Bathing Assistance 4  Minimal Assistance   UB Dressing Assistance 5  Supervision/Setup   LB Dressing Assistance 4  Minimal Assistance   Toileting Assistance  4  Minimal Assistance   Bed Mobility   Supine to Sit 5  Supervision   Sit to Supine 5  Supervision   Transfers   Sit to Stand 4  Minimal assistance   Stand to Sit 4  Minimal assistance   Functional Mobility   Functional Mobility 4  Minimal assistance   Additional Comments short household distance with RW   Balance   Static Sitting Fair +   Dynamic Sitting Fair   Static Standing Fair   Dynamic Standing Fair -   Activity Tolerance   Activity Tolerance Patient limited by fatigue;Patient limited by pain   RUE Assessment   RUE  Assessment WFL   LUE Assessment   LUE Assessment WFL   Cognition   Overall Cognitive Status WFL   Arousal/Participation Cooperative   Attention Within functional limits   Orientation Level Oriented X4   Following Commands Follows multistep commands with increased time or repetition   Assessment   Limitation Decreased ADL status;Decreased UE strength;Decreased Safe judgement during ADL;Decreased endurance;Decreased self-care trans;Decreased high-level ADLs  (decreased balance and mobility)   Prognosis Good   Assessment Patient evaluated by Occupational Therapy.  Patient admitted with Atrial fibrillation with RVR ([facility]).  The patients occupational profile, medical and therapy history includes a extensive additional review of physical, cognitive, or psychosocial history related to current functional performance.  Comorbidities affecting functional mobility and ADLS include: Afib, CABG, CAD, cancer, cardiac disease, COPD, and hypertension.  Prior to admission, patient was independent with functional mobility with walker, independent with ADLS, and requiring assist for IADLS.  The evaluation identifies the following performance deficits: weakness, impaired balance, decreased endurance, increased fall risk, new onset of impairment of functional mobility, decreased ADLS, decreased IADLS, pain, decreased activity tolerance, decreased safety awareness, and decreased strength, that result in activity limitations and/or participation restrictions. This evaluation requires clinical decision making of high complexity, because the patient presents with comorbidites that affect occupational performance and required significant modification of tasks or assistance with consideration of multiple treatment options.  The Barthel Index was used as a functional outcome tool presenting with a score of Barthel Index Score: 50, indicating marked limitations of functional mobility and ADLS.  The patient's raw score on the AM-PAC Daily  Activity Inpatient Short Form is 21. A raw score of greater than or equal to 19 suggests the patient may benefit from discharge to home. Please refer to the recommendation of the Occupational Therapist for safe discharge planning.  Patient will benefit from skilled Occupational Therapy services to address above deficits and facilitate a safe return to prior level of function.   Goals   Patient Goals to feel better and go home   STG Time Frame   (1-7 days)   Short Term Goal  Goals established to promote Patient Goals: to feel better and go home:   Grooming: supervision standing at sink; Bathing: supervision; Upper Body Dressing independent; Lower Body Dressing: supervision; Toileting: supervision; Patient will increase ambulatory standard toilet transfer to supervision with rolling walker to increase performance and safety with ADLS and functional mobility; Patient will increase standing tolerance to 4 minutes during ADL task to decrease assistance level and decrease fall risk; Patient will increase bed mobility to independent in preparation for ADLS and transfers; Patient will increase functional mobility to and from bathroom with rolling walker with supervision to increase performance with ADLS and to use a toilet; Patient will tolerate 5 minutes of UE ROM/strengthening to increase general activity tolerance and performance in ADLS/IADLS; Patient will improve functional activity tolerance to 10 minutes of sustained functional tasks to increase participation in basic self-care and decrease assistance level;  Patient will increase dynamic standing balance to fair to improve postural stability and decrease fall risk during standing ADLS and transfers.   LTG Time Frame   (8-14 days)   Long Term Goal Grooming: independent standing at sink; Bathing: independent;  Lower Body Dressing: independent; Toileting: independent; Patient will increase ambulatory standard toilet transfer to independent with rolling walker to  increase performance and safety with ADLS and functional mobility; Patient will increase standing tolerance to 8 minutes during ADL task to decrease assistance level and decrease fall risk;  Patient will increase functional mobility to and from bathroom with rolling walker independently to increase performance with ADLS and to use a toilet; Patient will tolerate 10 minutes of UE ROM/strengthening to increase general activity tolerance and performance in ADLS/IADLS; Patient will improve functional activity tolerance to 20 minutes of sustained functional tasks to increase participation in basic self-care and decrease assistance level;  Patient will increase dynamic standing balance to fair+ to improve postural stability and decrease fall risk during standing ADLS and transfers.  Pt will score >/= 24/24 on AM-PAC Daily Activity Inpatient scale to promote safe independence with ADLs and functional mobility; Pt will score >/= 80/100 on Barthel Index in order to decrease caregiver assistance needed and increase ability to perform ADLs and functional mobility.   Plan   Treatment Interventions ADL retraining;Functional transfer training;UE strengthening/ROM;Endurance training;Patient/family training;Equipment evaluation/education;Activityengagement;Compensatory technique education   Goal Expiration Date 11/29/24   OT Frequency 3-5x/wk   Discharge Recommendation   Rehab Resource Intensity Level, OT III (Minimum Resource Intensity)   AM-PAC Daily Activity Inpatient   Lower Body Dressing 3   Bathing 3   Toileting 3   Upper Body Dressing 4   Grooming 4   Eating 4   Daily Activity Raw Score 21   Daily Activity Standardized Score (Calc for Raw Score >=11) 44.27   AM-PAC Applied Cognition Inpatient   Following a Speech/Presentation 4   Understanding Ordinary Conversation 4   Taking Medications 4   Remembering Where Things Are Placed or Put Away 4   Remembering List of 4-5 Errands 3   Taking Care of Complicated Tasks 3   Applied  Cognition Raw Score 22   Applied Cognition Standardized Score 47.83   Barthel Index   Feeding 10   Bathing 0   Grooming Score 5   Dressing Score 5   Bladder Score 5   Bowels Score 10   Toilet Use Score 5   Transfers (Bed/Chair) Score 10   Mobility (Level Surface) Score 0   Stairs Score 0   Barthel Index Score 50   Licensure   [place] License Number  Ursula Reyes MS OTR/L [id number]

## 2024-11-15 NOTE — PROGRESS NOTES
[facility] received IB message for patient admission to [facility], arrved via EMS. Patient admitted on 11/14/24 for chest pain sob and previously admitted on 11/4. Per chart review, patient intoxicated upon arrival. Patient admitted with acute alcohol intoxication, A-fib with RVR, starvation ketosis, and metabolic derangement.     [facility] will continue to monitor patient progress and remain available to assist as needed.

## 2024-11-15 NOTE — QUICK NOTE
Notified by nursing at approximately 18:45 that HR increased to 140's, /99. On initial eval, pt tired but AO X 3, able to tolerate PO intake. Resumed home PO lopressor 50 mg without improvement in HR.     Ordered lopressor 5 mg IV x 1 without significant improvement in HR, BP remained > 130's systolic.     Upon re-evaluation, patient started to complain of CP, SOB, was placed on 2L NC by nursing, Spo2 > 88%. Ordered cardizem 20 mg IV x 1, EKG, Trop, BNP. HR improved to 110's s/p IV cardizem with stable BP around 00:00.     Decreased fluids to 50 cc/hr with recheck of BMP in AM. Restarted insulin sliding scale in setting of hyperglycemia. At approximately 03:40, notified by nursing HR returned to 140's with stable BP, patient asymptomatic at that time.     IVF discontinued, magnesium 1 gm ordered, cardizem 20 mg IV  x 1 ordered. HR decreased to 80's    18:00  -Na corrected 131, bicarb 22 (was 21), AG closed (was 15), Cr improved to 1.08, glucose, Ca increased to 7.4 (ionized 1.03), Mag increased to 1.6 --> repleted Mag & Ca    EKG: atrial flutter with 2:1 conduction, HR 150s    Trop 31, was 33 on admission

## 2024-11-15 NOTE — ASSESSMENT & PLAN NOTE
patient with history of recurrent atrial fibrillation  8/16/2024 TTE: EF visibly estimated 65% with severe dilatation of left atrium  unclear last time patient took his medications secondary to intoxication  telemetry demonstrates rapid atrial fibrillation rates 130s to 160s  patient started on Cardizem drip 11/15/2024  continue Lopressor 50 mg Q 12 hours  continue Eliquis 5 mg bid  would avoid amiodarone secondary to patient's age and history of chronic alcoholism  continue monitor telemetry

## 2024-11-15 NOTE — UTILIZATION REVIEW
Initial Clinical Review    Admission: Date/Time/Statement:   Admission Orders (From admission, onward)       Ordered        11/14/24 0433  INPATIENT ADMISSION  Once                          Orders Placed This Encounter   Procedures    INPATIENT ADMISSION     Standing Status:   Standing     Number of Occurrences:   1     Level of Care:   Level 2 Stepdown / HOT [14]     Estimated length of stay:   More than 2 Midnights     Certification:   I certify that inpatient services are medically necessary for this patient for a duration of greater than two midnights. See H&P and MD Progress Notes for additional information about the patient's course of treatment.     ED Arrival Information       Expected   -    Arrival   11/14/2024 00:07    Acuity   Emergent              Means of arrival   Ambulance    Escorted by   [facility]  ([place])    Service   Family Medicine    Admission type   Emergency              Arrival complaint   rapid heartrate             Chief Complaint   Patient presents with    Atrial Fibrillation     Pt in afib RVR per medics. Given 15mg cardizem in route and bp in 60's systolic upon arrival to ER       Initial Presentation:   64 yom to ER from home via EMS with several days of shortness of breath, malaise, and intermittent chest pain. Hx coronary artery disease, stenting/coronary bypass, chronic obstructive pulmonary disease, atrial fibrillation on Eliquis, thrombocytopenia, type 2 diabetes, alcohol abuse, tobacco abuse, hypertension, and hyperlipidemia. Presents hypotensive, wheezing. Admission CT head neg. CT cervical spine neg. CT chest, a/p: Mildly displaced left lateral seventh rib fracture. EKG: aflutter. Labs:  PLT 96, Na 130, CL 89, CO2 20, anion gap 21, BUN 37, Cr 1.60, Ca 7.6, Mg 1.1, ast 82, alk phos 133, VBG: pH 7.291, pCO2 38.3, pO2 56, HCO3 18, PT 15.1, BNP 1150, u/a+ketones, prot, WBC, ETOH 381.  Admitted to inpatient status for afib with RVR. Started on IV Cardizem gtt.  Placed on telemetry, WA protocol.      Date: 11/15/24   Day 2:   Afib with RVR, mgt per cardio. Tachycardic overnight 130-160's. Remains on IV Cardizem gtt. Continue Eliquis, Lopressor. IV Lasix given x 1 for volume overload. Mg & K repletion given. Continue telemetry, monitor VS/HR, follow labs/lytes.       ED Treatment-Medication Administration from 11/14/2024 0007 to 11/14/2024 0853         Date/Time Order Dose Route Action     11/14/2024 0035 sodium chloride 0.9 % bolus 1,000 mL 1,000 mL Intravenous New Bag     11/14/2024 0116 thiamine (VITAMIN B1) 100 mg in sodium chloride 0.9 % 50 mL IVPB 100 mg Intravenous New Bag     11/14/2024 0153 dextrose 5 % and sodium chloride 0.9 % bolus 500 mL 500 mL Intravenous New Bag     11/14/2024 0143 metoprolol (LOPRESSOR) injection 5 mg 5 mg Intravenous Given     11/14/2024 0212 metoprolol (LOPRESSOR) injection 5 mg 5 mg Intravenous Given     11/14/2024 0216 dextrose 5 % and sodium chloride 0.9 % bolus 500 mL 500 mL Intravenous New Bag     11/14/2024 0216 multi-electrolyte (ISOLYTE-S PH 7.4) bolus 1,000 mL 1,000 mL Intravenous New Bag     11/14/2024 0305 midazolam (VERSED) injection 2 mg 2 mg Intravenous Given     11/14/2024 0402 diltiazem (CARDIZEM) 125 mg in sodium chloride 0.9 % 125 mL infusion 5 mg/hr Intravenous New Bag     11/14/2024 0418 diltiazem (CARDIZEM) 125 mg in sodium chloride 0.9 % 125 mL infusion 7.5 mg/hr Intravenous Rate/Dose Change     11/14/2024 0511 diltiazem (CARDIZEM) 125 mg in sodium chloride 0.9 % 125 mL infusion 12.5 mg/hr Intravenous Rate/Dose Change     11/14/2024 0622 diltiazem (CARDIZEM) 125 mg in sodium chloride 0.9 % 125 mL infusion 15 mg/hr Intravenous New Bag     11/14/2024 0712 diltiazem (CARDIZEM) 125 mg in sodium chloride 0.9 % 125 mL infusion 10 mg/hr Intravenous Rate/Dose Change     11/14/2024 0432 calcium gluconate 1 g in sodium chloride 0.9% 50 mL (premix) 1 g Intravenous New Bag     11/14/2024 0445 midazolam (VERSED) injection 2 mg 2  mg Intravenous Given     11/14/2024 0443 lactated ringers infusion 125 mL/hr Intravenous New Bag     11/14/2024 0623 calcium gluconate 1 g in sodium chloride 0.9% 50 mL (premix) 1 g Intravenous New Bag     11/14/2024 0513 magnesium sulfate 2 g/50 mL IVPB (premix) 2 g 2 g Intravenous New Bag     11/14/2024 0505 metoprolol (LOPRESSOR) injection 5 mg 5 mg Intravenous Given     11/14/2024 0506 midazolam (VERSED) injection 2 mg 2 mg Intravenous Given     11/14/2024 0716 pantoprazole (PROTONIX) EC tablet 40 mg 40 mg Oral Given     11/14/2024 0716 ferrous sulfate tablet 325 mg 325 mg Oral Given     11/14/2024 0603 magnesium sulfate 2 g/50 mL IVPB (premix) 2 g 2 g Intravenous New Bag     11/14/2024 0607 albumin human (FLEXBUMIN) 5 % injection 12.5 g 12.5 g Intravenous New Bag     11/14/2024 0716 ipratropium-albuterol (DUO-NEB) 0.5-2.5 mg/3 mL inhalation solution 3 mL 3 mL Nebulization Given     11/14/2024 0730 magnesium sulfate 2 g/50 mL IVPB (premix) 2 g 2 g Intravenous New Bag     11/14/2024 0717 potassium chloride 20 mEq IVPB (premix) 20 mEq Intravenous New Bag     11/14/2024 0700 dextrose 5 % in lactated Ringer's infusion 125 mL/hr Intravenous New Bag            Scheduled Medications:  Medications 11/06 11/07 11/08 11/09 11/10 11/11 11/12 11/13 11/14 11/15   albumin human (FLEXBUMIN) 5 % injection 12.5 g  Dose: 12.5 g  Freq: Once Route: IV  Last Dose: Stopped (11/14/24 0628)  Start: 11/14/24 0615 End: 11/14/24 0628   Admin Instructions:               0607 0628         amiodarone 150 mg in dextrose 5 % 100 mL IV bolus  Dose: 150 mg  Freq: Once Route: IV  Start: 11/14/24 0545 End: 11/14/24 0548            0548-D/C'd  (0701)         apixaban (ELIQUIS) tablet 5 mg  Dose: 5 mg  Freq: 2 times daily Route: PO  Start: 11/14/24 1900   Admin Instructions:      Order specific questions:               2488 [id number] 0182        apixaban (ELIQUIS) tablet 5 mg  Dose: 5 mg  Freq: 2 times daily Route: PO  Start: 11/15/24 0900  End: 11/14/24 1849   Admin Instructions:      Order specific questions:               1849-D/C'd       apixaban (ELIQUIS) tablet 5 mg  Dose: 5 mg  Freq: 2 times daily Route: PO  Start: 11/14/24 0900 End: 11/14/24 1821   Admin Instructions:      Order specific questions:               1111     1618     1800     1821     1821-D/C'd       calcium gluconate 1 g in sodium chloride 0.9% 50 mL (premix)  Dose: 1 g  Freq: Once Route: IV  Last Dose: 1 g (11/14/24 2011)  Start: 11/14/24 2000 End: 11/14/24 2041   Admin Instructions:               2011         calcium gluconate 1 g in sodium chloride 0.9% 50 mL (premix)  Dose: 1 g  Freq: Once Route: IV  Last Dose: Stopped (11/14/24 0653)  Start: 11/14/24 0500 End: 11/14/24 0653   Admin Instructions:               0623     0653         calcium gluconate 1 g in sodium chloride 0.9% 50 mL (premix)  Dose: 1 g  Freq: Once Route: IV  Last Dose: Stopped (11/14/24 0502)  Start: 11/14/24 0430 End: 11/14/24 0502   Admin Instructions:               0432     0502         calcium gluconate 2 g in sodium chloride 0.9% 100 mL (premix)  Dose: 2 g  Freq: Once Route: IV  Last Dose: Stopped (11/15/24 0752)  Start: 11/15/24 0615 End: 11/15/24 0752   Admin Instructions:                0640     0752        calcium gluconate 2 g in sodium chloride 0.9% 100 mL (premix)  Dose: 2 g  Freq: Once Route: IV  Last Dose: Stopped (11/14/24 1245)  Start: 11/14/24 0915 End: 11/14/24 1245   Admin Instructions:               1145     1245         dextrose 5 % and sodium chloride 0.9 % bolus 500 mL  Dose: 500 mL  Freq: Once Route: IV  Last Dose: Stopped (11/14/24 0400)  Start: 11/14/24 0215 End: 11/14/24 0400            0216     0400         dextrose 5 % and sodium chloride 0.9 % bolus 500 mL  Dose: 500 mL  Freq: Once Route: IV  Last Dose: Stopped (11/14/24 0221)  Start: 11/14/24 0115 End: 11/14/24 0221            0153     0221         diltiazem (CARDIZEM) injection 15 mg  Dose: 15 mg  Freq: Once Route: IV  Start:  11/14/24 0530 End: 11/14/24 0548   Admin Instructions:               0548-D/C'd  (0701)         diltiazem (CARDIZEM) injection 15 mg  Dose: 15 mg  Freq: Once Route: IV  Start: 11/14/24 0530 End: 11/14/24 0525   Admin Instructions:               0525-D/C'd       diltiazem (CARDIZEM) injection 20 mg  Dose: 20 mg  Freq: Once Route: IV  Start: 11/15/24 0400 End: 11/15/24 0400   Admin Instructions:                0400        diltiazem (CARDIZEM) injection 20 mg  Dose: 20 mg  Freq: Once Route: IV  Start: 11/14/24 2345 End: 11/15/24 0020   Admin Instructions:                0020        ferrous sulfate tablet 325 mg  Dose: 325 mg  Freq: Daily with breakfast Route: PO  Start: 11/15/24 0730   Admin Instructions:                0757        ferrous sulfate tablet 325 mg  Dose: 325 mg  Freq: Daily with breakfast Route: PO  Start: 11/14/24 0730 End: 11/14/24 1821   Admin Instructions:               0716     1618     1821     1821-D/C'd       fluticasone-vilanterol 200-25 mcg/actuation 1 puff  Dose: 1 puff  Freq: Daily Route: IN  Start: 11/15/24 0900   Admin Instructions:                0804        fluticasone-vilanterol 200-25 mcg/actuation 1 puff  Dose: 1 puff  Freq: Daily Route: IN  Start: 11/14/24 0900 End: 11/14/24 1821   Admin Instructions:               1111     1618     1821     1821-D/C'd       folic acid (FOLVITE) tablet 1,000 mcg  Dose: 1,000 mcg  Freq: Daily Route: PO  Start: 11/15/24 0900             0803        folic acid (FOLVITE) tablet 1,000 mcg  Dose: 1,000 mcg  Freq: Daily Route: PO  Start: 11/14/24 0900 End: 11/14/24 1821            1111     1618     1821-D/C'd  1821         furosemide (LASIX) injection 20 mg  Dose: 20 mg  Freq: Once Route: IV  Start: 11/15/24 0930 End: 11/15/24 1036   Admin Instructions:      Order specific questions:                1036        gabapentin (NEURONTIN) capsule 300 mg  Dose: 300 mg  Freq: 3 times daily Route: PO  Start: 11/14/24 2100   Admin Instructions:               2130       0803     1600     2100        gabapentin (NEURONTIN) capsule 300 mg  Dose: 300 mg  Freq: Daily Route: PO  Start: 11/15/24 0900 End: 11/14/24 1848   Admin Instructions:               1848-D/C'd       gabapentin (NEURONTIN) capsule 300 mg  Dose: 300 mg  Freq: Daily Route: PO  Start: 11/14/24 0900 End: 11/14/24 1821   Admin Instructions:               1111     1618     1821     1821-D/C'd       influenza vaccine, recombinant (PF) (Flublok) IM injection 0.5 mL  Dose: 0.5 mL  Freq: Once Route: IM  Indications of Use: VACCINATION  Start: 11/14/24 1015 End: 11/15/24 0810   Admin Instructions:      Order specific questions:               1618     1829      0810         insulin lispro (HumALOG/ADMELOG) 100 units/mL subcutaneous injection 1-6 Units  Dose: 1-6 Units  Freq: 4 times daily (before meals and at bedtime) Route: SC  Start: 11/14/24 2200   Admin Instructions:               2240) [C]      0749     1207     1600     2200         insulin lispro (HumALOG/ADMELOG) 100 units/mL subcutaneous injection 1-6 Units  Dose: 1-6 Units  Freq: 4 times daily (before meals and at bedtime) Route: SC  Start: 11/14/24 0700 End: 11/14/24 0648   Admin Instructions:               0648-D/C'd       insulin lispro (HumALOG/ADMELOG) 100 units/mL subcutaneous injection 5 Units  Dose: 5 Units  Freq: Once Route: SC  Start: 11/14/24 1930 End: 11/14/24 1928   Admin Instructions:               1928 [C]         ipratropium-albuterol (DUO-NEB) 0.5-2.5 mg/3 mL inhalation solution 3 mL  Dose: 3 mL  Freq: Once Route: NEBULIZATION  Start: 11/14/24 0645 End: 11/14/24 0716            0716         ketorolac (TORADOL) injection 15 mg  Dose: 15 mg  Freq: Once Route: IV  Start: 11/04/24 1700 End: 11/04/24 1654   Admin Instructions:                   lidocaine (LIDODERM) 5 % patch 1 patch  Dose: 1 patch  Freq: Once Route: TP  Start: 11/04/24 2000 End: 11/04/24 2217   Admin Instructions:      Order specific questions:                   LORazepam (ATIVAN) tablet 2  mg  Dose: 2 mg  Freq: Once Route: PO  Indications of Use: ALCOHOL WITHDRAWAL SYNDROME  Start: 11/15/24 0800 End: 11/15/24 0754   Admin Instructions:                0754        magnesium Oxide (MAG-OX) tablet 400 mg  Dose: 400 mg  Freq: 2 times daily Route: PO  Start: 11/15/24 0900 End: 11/16/24 0859             0803     1800        magnesium sulfate 2 g/50 mL IVPB (premix) 2 g  Dose: 2 g  Freq: Once Route: IV  Last Dose: 2 g (11/14/24 1928)  Start: 11/14/24 1915 End: 11/14/24 2128   Admin Instructions:               1928         magnesium sulfate 2 g/50 mL IVPB (premix) 2 g  Dose: 2 g  Freq: Once Route: IV  Last Dose: Stopped (11/14/24 1135)  Start: 11/14/24 0900 End: 11/14/24 1135   Admin Instructions:               0935     1135         magnesium sulfate 2 g/50 mL IVPB (premix) 2 g  Dose: 2 g  Freq: Once Route: IV  Last Dose: Stopped (11/14/24 0834)  Start: 11/14/24 0730 End: 11/14/24 0834   Admin Instructions:               0730 0834         magnesium sulfate 2 g/50 mL IVPB (premix) 2 g  Dose: 2 g  Freq: Once Route: IV  Last Dose: Stopped (11/14/24 0705)  Start: 11/14/24 0600 End: 11/14/24 0705   Admin Instructions:               0603     0705         magnesium sulfate 2 g/50 mL IVPB (premix) 2 g  Dose: 2 g  Freq: Once Route: IV  Last Dose: Stopped (11/14/24 0533)  Start: 11/14/24 0500 End: 11/14/24 0533   Admin Instructions:               0513 [C]     0533         magnesium sulfate 4 g/100 mL IVPB (premix) 4 g  Dose: 4 g  Freq: Once Route: IV  Start: 11/14/24 0445 End: 11/14/24 0508   Admin Instructions:               0508-D/C'd  (0514)         magnesium sulfate IVPB (premix) SOLN 1 g  Dose: 1 g  Freq: Once Route: IV  Start: 11/15/24 0400 End: 11/15/24 0500   Admin Instructions:                0400        metoprolol (LOPRESSOR) injection 5 mg  Dose: 5 mg  Freq: Once Route: IV  Start: 11/14/24 0515 End: 11/14/24 0505   Admin Instructions:               0505         metoprolol (LOPRESSOR) injection 5 mg  Dose:  5 mg  Freq: Once Route: IV  Start: 11/14/24 0200 End: 11/14/24 0212   Admin Instructions:               0212         metoprolol (LOPRESSOR) injection 5 mg  Dose: 5 mg  Freq: Once Route: IV  Start: 11/14/24 0145 End: 11/14/24 0143   Admin Instructions:               0143         metoprolol tartrate (LOPRESSOR) tablet 25 mg  Dose: 25 mg  Freq: Every 12 hours scheduled Route: PO  Start: 11/14/24 2100 End: 11/14/24 1847   Admin Instructions:      Order specific questions:               1847-D/C'd       metoprolol tartrate (LOPRESSOR) tablet 25 mg  Dose: 25 mg  Freq: Every 12 hours scheduled Route: PO  Start: 11/14/24 1100 End: 11/14/24 1821   Admin Instructions:      Order specific questions:               1109     1618     1821     1821-D/C'd       metoprolol tartrate (LOPRESSOR) tablet 50 mg  Dose: 50 mg  Freq: Every 12 hours scheduled Route: PO  Start: 11/14/24 1900   Admin Instructions:      Order specific questions:               1902      0803 2100        midazolam (VERSED) injection 2 mg  Dose: 2 mg  Freq: Once Route: IV  Start: 11/14/24 0515 End: 11/14/24 0506   Admin Instructions:               0506         midazolam (VERSED) injection 2 mg  Dose: 2 mg  Freq: Once Route: IV  Start: 11/14/24 0430 End: 11/14/24 0445   Admin Instructions:               0445         midazolam (VERSED) injection 2 mg  Dose: 2 mg  Freq: Once Route: IV  Start: 11/14/24 0315 End: 11/14/24 0305   Admin Instructions:               0305         multi-electrolyte (ISOLYTE-S PH 7.4) bolus 1,000 mL  Dose: 1,000 mL  Freq: Once Route: IV  Last Dose: Stopped (11/14/24 0316)  Start: 11/14/24 0215 End: 11/14/24 0316 0216     0316         nicotine (NICODERM CQ) 14 mg/24hr TD 24 hr patch 1 patch  Dose: 1 patch  Freq: Daily Route: TD  Start: 11/15/24 0900   Admin Instructions:                (0803)        nicotine (NICODERM CQ) 14 mg/24hr TD 24 hr patch 1 patch  Dose: 1 patch  Freq: Daily Route: TD  Start: 11/14/24 0900 End: 11/14/24  1821   Admin Instructions:               (2133) 4696     1824     1821-D/C'd       OLANZapine (ZyPREXA) IM injection 10 mg  Dose: 10 mg  Freq: Once Route: IM  Start: 11/14/24 0045 End: 11/14/24 0302   Admin Instructions:               (2085) [C]     0302-D/C'd       pantoprazole (PROTONIX) EC tablet 40 mg  Dose: 40 mg  Freq: 2 times daily before meals Route: PO  Start: 11/15/24 0700   Admin Instructions:                0757     1600        pantoprazole (PROTONIX) EC tablet 40 mg  Dose: 40 mg  Freq: 2 times daily before meals Route: PO  Start: 11/14/24 0700 End: 11/14/24 1821   Admin Instructions:               0716     1558     161     1821     1821-D/C'd       PHENobarbital 260 mg in sodium chloride 0.9 % 100 mL IVPB  Dose: 260 mg  Freq: Once Route: IV  Last Dose: Stopped (11/14/24 1133)  Start: 11/14/24 1030 End: 11/14/24 1133   Admin Instructions:               1103     1133         PHENobarbital injection 130 mg  Dose: 130 mg  Freq: Once Route: IV  Start: 11/14/24 1530 End: 11/14/24 1604   Admin Instructions:               1604         PHENobarbital injection 130 mg  Dose: 130 mg  Freq: Once Route: IV  Start: 11/14/24 1330 End: 11/14/24 1342   Admin Instructions:               1342         potassium chloride (Klor-Con M20) CR tablet 20 mEq  Dose: 20 mEq  Freq: Once Route: PO  Start: 11/15/24 0715 End: 11/15/24 0757   Admin Instructions:                0757        potassium chloride (Klor-Con M20) CR tablet 40 mEq  Dose: 40 mEq  Freq: Once Route: PO  Start: 11/14/24 1015 End: 11/14/24 1111   Admin Instructions:               1111         potassium chloride 20 mEq IVPB (premix)  Dose: 20 mEq  Freq: Every 2 hours Route: IV  Last Dose: Stopped (11/14/24 1352)  Start: 11/14/24 0645 End: 11/14/24 1352   Admin Instructions:               0717     0829     0936     1136     1152     1352         predniSONE tablet 40 mg  Dose: 40 mg  Freq: Daily Route: PO  Start: 11/15/24 0900 End: 11/15/24 1003   Admin  Instructions:                0802     1003-D/C'd      predniSONE tablet 40 mg  Dose: 40 mg  Freq: Daily Route: PO  Start: 11/14/24 0900 End: 11/14/24 1821   Admin Instructions:               1111     1618     1821     1821-D/C'd       ranolazine (RANEXA) 12 hr tablet 500 mg  Dose: 500 mg  Freq: Every 12 hours Route: PO  Start: 11/14/24 0515 End: 11/14/24 0648   Admin Instructions:               0648-D/C'd  (0700)         sodium chloride 0.9 % bolus 1,000 mL  Dose: 1,000 mL  Freq: Once Route: IV  Last Dose: Stopped (11/14/24 0200)  Start: 11/14/24 0030 End: 11/14/24 0200 0035     0200         sodium chloride 0.9 % bolus 1,000 mL  Dose: 1,000 mL  Freq: Once Route: IV  Last Dose: Stopped (11/04/24 1849)  Start: 11/04/24 1700 End: 11/04/24 1849                tamsulosin (FLOMAX) capsule 0.4 mg  Dose: 0.4 mg  Freq: Daily Route: PO  Start: 11/15/24 0900   Admin Instructions:                0802        tamsulosin (FLOMAX) capsule 0.4 mg  Dose: 0.4 mg  Freq: Daily Route: PO  Start: 11/14/24 0900 End: 11/14/24 1821   Admin Instructions:               1111     1618     1821     1821-D/C'd       thiamine (VITAMIN B1) 100 mg in sodium chloride 0.9 % 50 mL IVPB  Dose: 100 mg  Freq: Once Route: IV  Last Dose: Stopped (11/14/24 0146)  Start: 11/14/24 0030 End: 11/14/24 0146            0116     0146         thiamine tablet 100 mg  Dose: 100 mg  Freq: Daily Route: PO  Start: 11/15/24 0900             0803        thiamine tablet 100 mg  Dose: 100 mg  Freq: Daily Route: PO  Start: 11/14/24 0900 End: 11/14/24 1821            1111     1618     1821     1821-D/C'd                   Continuous Meds Sorted by Name  for Gregg Partida as of 11/06/24 through 11/15/24  Legend:       Medications 11/06 11/07 11/08 11/09 11/10 11/11 11/12 11/13 11/14 11/15    amiodarone (CORDARONE) 900 mg in dextrose 5 % 500 mL infusion  Rate: 33.3 mL/hr Dose: 1 mg/min  Freq: Continuous Route: IV  Start: 11/14/24 0545 End: 11/14/24 0547   Admin  Instructions:               0547-D/C'd  (0701)         Followed by   amiodarone (CORDARONE) 900 mg in dextrose 5 % 500 mL infusion  Rate: 16.7 mL/hr Dose: 0.5 mg/min  Freq: Continuous Route: IV  Start: 11/14/24 1145 End: 11/14/24 0547   Admin Instructions:               0547-D/C'd       dextrose 5 % in lactated Ringer's infusion  Rate: 125 mL/hr Dose: 125 mL/hr  Freq: Continuous Route: IV  Last Dose: 125 mL/hr (11/14/24 1900)  Start: 11/14/24 0700 End: 11/14/24 1946            0700     0834     0922     1900     1946-D/C'd       diltiazem (CARDIZEM) 125 mg in sodium chloride 0.9 % 125 mL infusion  Rate: 1-15 mL/hr Dose: 1-15 mg/hr  Freq: Titrated Route: IV  Last Dose: 7.5 mg/hr (11/15/24 0840)  Start: 11/15/24 0700   Admin Instructions:                0740 0840        diltiazem (CARDIZEM) 125 mg in sodium chloride 0.9 % 125 mL infusion  Rate: 1-15 mL/hr Dose: 1-15 mg/hr  Freq: Titrated Route: IV  Last Dose: Stopped (11/14/24 1320)  Start: 11/14/24 0315 End: 11/14/24 1610   Admin Instructions:               0402 [C]     0418     0511 [C]     0622 [C]     0712     0854     1119     1156     1214     1320     1610-D/C'd       insulin regular (HumuLIN R,NovoLIN R) 1 Units/mL in sodium chloride 0.9 % 100 mL infusion  Rate: 0.3-21 mL/hr Dose: 0.3-21 Units/hr  Freq: Titrated Route: IV  Last Dose: Stopped (11/14/24 0712)  Start: 11/14/24 0700 End: 11/14/24 1610   Admin Instructions:      Order specific questions:               0712     1610-D/C'd       lactated ringers infusion  Rate: 50 mL/hr Dose: 50 mL/hr  Freq: Continuous Route: IV  Last Dose: 50 mL/hr (11/15/24 0116)  Start: 11/14/24 1915 End: 11/15/24 0347            1927      0115     0116     0347-D/C'd      lactated ringers infusion  Rate: 125 mL/hr Dose: 125 mL/hr  Freq: Continuous Route: IV  Last Dose: Stopped (11/14/24 0557)  Start: 11/14/24 0445 End: 11/14/24 0549            0443     0549-D/C'd  0557         multi-electrolyte (PLASMALYTE-A/ISOLYTE-S PH 7.4)  IV solution  Rate: 100 mL/hr Dose: 100 mL/hr  Freq: Continuous Route: IV  Start: 11/14/24 0600 End: 11/14/24 0648            0648-D/C'd  (0701)         multi-electrolyte (PLASMALYTE-A/ISOLYTE-S PH 7.4) IV solution  Rate: 125 mL/hr Dose: 125 mL/hr  Freq: Continuous Route: IV  Start: 11/14/24 0430 End: 11/14/24 0430 0430-D/C'd  (0700)                     PRN Meds Sorted by Name  for Gregg Partida as of 11/06/24 through 11/15/24  Legend:       Medications 11/06 11/07 11/08 11/09 11/10 11/11 11/12 11/13 11/14 11/15   acetaminophen (TYLENOL) tablet 650 mg  Dose: 650 mg  Freq: Every 6 hours PRN Route: PO  PRN Reasons: mild pain,headaches,fever  Indications of Use: FEVER,HEADACHE,MILD PAIN  Start: 11/14/24 1821                acetaminophen (TYLENOL) tablet 650 mg  Dose: 650 mg  Freq: Every 6 hours PRN Route: PO  PRN Reasons: mild pain,headaches,fever  Indications of Use: FEVER,HEADACHE,MILD PAIN  Start: 11/14/24 0447 End: 11/14/24 1821 1618 1821 1821-D/C'd       levalbuterol (XOPENEX) inhalation solution 1.25 mg  Dose: 1.25 mg  Freq: Every 8 hours PRN Route: NEBULIZATION  PRN Reasons: wheezing,shortness of breath  Start: 11/14/24 1821                levalbuterol (XOPENEX) inhalation solution 1.25 mg  Dose: 1.25 mg  Freq: Every 8 hours PRN Route: NEBULIZATION  PRN Reasons: wheezing,shortness of breath  Start: 11/14/24 0511 End: 11/14/24 1821 1618 1821 1821-D/C'd       metoprolol (LOPRESSOR) injection 5 mg  Dose: 5 mg  Freq: Once as needed Route: IV  PRN Reason: high blood pressure  PRN Comment: HR > 130  Start: 11/14/24 1848 End: 11/14/24 2139   Admin Instructions:               2139         senna-docusate sodium (SENOKOT S) 8.6-50 mg per tablet 1 tablet  Dose: 1 tablet  Freq: Daily PRN Route: PO  PRN Reason: constipation  Start: 11/14/24 1821                senna-docusate sodium (SENOKOT S) 8.6-50 mg per tablet 1 tablet  Dose: 1 tablet  Freq: Daily PRN Route: PO  PRN  Reason: constipation  Start: 11/14/24 0511 End: 11/14/24 1821            1618     1821     1821-D/C'd       trimethobenzamide (TIGAN) IM injection 200 mg  Dose: 200 mg  Freq: Every 6 hours PRN Route: IM  PRN Reasons: nausea,vomiting  Start: 11/14/24 1821   Admin Instructions:                   trimethobenzamide (TIGAN) IM injection 200 mg  Dose: 200 mg  Freq: Every 6 hours PRN Route: IM  PRN Reasons: nausea,vomiting  Start: 11/14/24 0449 End: 11/14/24 1821   Admin Instructions:               1618     1821     1821-D/C'd       Legend:       [id number]/[id number]/[id number]          ED Triage Vitals   Temperature Pulse Respirations Blood Pressure SpO2 Pain Score   11/14/24 0218 11/14/24 0013 11/14/24 0013 11/14/24 0013 11/14/24 0013 11/14/24 0900   97.7 °F (36.5 °C) 89 18 (!) 82/51 94 % 6     Weight (last 2 days)       Date/Time Weight    11/15/24 0600 83.9 (185)    11/14/24 0900 84.3 (185.85)            Vital Signs (last 3 days)       Date/Time Temp Pulse Resp BP MAP (mmHg) SpO2 Calculated FIO2 (%) - Nasal Cannula Nasal Cannula O2 Flow Rate (L/min) O2 Device Patient Position - Orthostatic VS CIWA-Ar Total Pain    11/15/24 09:58:33 -- 74 -- 110/66 81 93 % -- -- -- -- -- --    11/15/24 08:58:27 -- 163 -- 124/86 99 92 % -- -- -- -- -- --    11/15/24 0845 -- -- -- 142/77 -- -- -- -- -- -- 4 --    11/15/24 0815 -- 159 -- 154/98 117 89 % -- -- -- -- -- --    11/15/24 0800 -- -- -- -- -- -- 28 2 L/min Nasal cannula -- -- --    11/15/24 0755 -- -- -- -- -- -- -- -- -- -- -- No Pain    11/15/24 0745 -- 157 -- 159/118 132 92 % -- -- -- -- 12 --    11/15/24 0715 98.5 °F (36.9 °C) 159 -- 163/107 126 93 % -- -- -- -- -- --    11/15/24 03:40:51 -- 158 -- 141/98 112 91 % -- -- -- -- 4 --    11/15/24 0245 -- 118 -- 124/70 -- -- -- -- -- -- 0 --    11/15/24 00:49:44 -- 104 -- 125/69 88 91 % -- -- -- -- -- --    11/14/24 23:41:30 -- 159 -- 136/91 106 93 % -- -- -- -- -- --    11/14/24 22:07:26 99 °F (37.2  °C) 159 -- 139/88 105 93 % -- -- -- -- 5 --    11/14/24 2100 -- -- -- -- -- -- -- -- -- -- -- No Pain    11/14/24 20:25:56 -- 81 -- 135/72 93 95 % -- -- -- -- -- --    11/14/24 19:07:44 98.3 °F (36.8 °C) 161 18 148/103 118 94 % -- -- -- -- -- --    11/14/24 1800 -- -- -- -- -- -- -- -- -- -- 3 --    11/14/24 17:56:45 98.2 °F (36.8 °C) 148 18 151/99 116 94 % -- -- -- -- -- --    11/14/24 16:29:52 98 °F (36.7 °C) 88 19 127/72 90 92 % -- -- -- -- -- --    11/14/24 1545 98.7 °F (37.1 °C) -- -- -- -- -- -- -- -- -- -- --    11/14/24 1500 -- 99 -- 116/66 85 97 % -- -- -- -- -- --    11/14/24 1400 -- 77 -- 127/62 87 97 % -- -- -- -- -- --    11/14/24 1330 -- 77 27 115/70 87 99 % -- -- -- -- 8 --    11/14/24 1300 -- 76 27 112/70 85 99 % -- -- -- -- -- --    11/14/24 1200 -- 76 19 102/64 78 98 % 28 2 L/min Nasal cannula Lying 11 4    11/14/24 1109 98.7 °F (37.1 °C) -- -- -- -- -- -- -- -- -- -- --    11/14/24 1100 -- 77 28 107/63 80 99 % -- -- -- -- -- --    11/14/24 1000 -- 76 21 110/64 82 98 % -- -- -- -- -- --    11/14/24 0900 97.8 °F (36.6 °C) 82 22 114/57 82 -- 28 2 L/min Nasal cannula Lying 10 6    11/14/24 0830 -- 78 19 121/68 89 97 % -- -- -- -- -- --    11/14/24 0815 -- 78 19 115/85 97 97 % -- -- -- -- -- --    11/14/24 0800 -- 78 13 121/69 89 97 % -- -- -- -- -- --    11/14/24 0745 -- 76 20 112/65 84 96 % -- -- -- -- -- --    11/14/24 0730 -- 73 20 105/63 79 98 % -- -- -- -- -- --    11/14/24 0715 -- 74 20 101/66 79 97 % -- -- -- -- -- --    11/14/24 0700 -- 97 30 98/57 72 96 % 28 2 L/min Nasal cannula Lying -- --    11/14/24 0645 -- 75 33 103/65 79 98 % 28 2 L/min Nasal cannula Lying -- --    11/14/24 0630 -- 129 29 101/58 77 97 % 28 2 L/min Nasal cannula Lying -- --    11/14/24 0625 -- 96 24 94/85 71 97 % 28 2 L/min Nasal cannula Lying -- --    11/14/24 0615 -- 97 24 98/53 69 96 % 28 2 L/min Nasal cannula Lying -- --    11/14/24 0600 -- 131 26 93/54 68 98 % 28 2 L/min Nasal cannula Lying -- --    11/14/24 0554 --  142 22 83/50 62 97 % 28 2 L/min Nasal cannula Lying -- --    11/14/24 0545 -- 99 25 107/57 75 97 % 28 2 L/min Nasal cannula Lying -- --    11/14/24 0534 -- -- -- -- -- -- -- -- Nasal cannula -- -- --    11/14/24 0530 -- 126 33 90/55 68 97 % 28 2 L/min Nasal cannula Lying -- --    11/14/24 0515 -- 137 24 89/50 64 96 % 28 2 L/min Nasal cannula Lying -- --    11/14/24 0445 -- 107 18 107/59 79 97 % -- -- -- Lying -- --    11/14/24 0415 -- 130 18 101/59 77 93 % -- -- None (Room air) Lying -- --    11/14/24 0400 -- 147 -- 119/63 -- -- -- -- -- -- -- --    11/14/24 0345 -- 132 18 99/55 70 94 % 28 2 L/min Nasal cannula Lying -- --    11/14/24 0330 -- 132 16 104/55 72 98 % 28 2 L/min Nasal cannula Lying -- --    11/14/24 0245 -- 116 16 91/59 69 94 % -- -- None (Room air) Lying -- --    11/14/24 0230 -- 131 16 123/70 91 94 % -- -- None (Room air) Lying -- --    11/14/24 0218 97.7 °F (36.5 °C) -- -- -- -- -- -- -- -- -- -- --    11/14/24 0215 -- 140 16 99/64 77 93 % -- -- None (Room air) Lying -- --    11/14/24 0200 -- 128 16 105/55 72 91 % -- -- None (Room air) Lying -- --    11/14/24 0145 -- 135 18 119/55 77 92 % -- -- None (Room air) Lying -- --    11/14/24 0130 -- 140 20 117/86 98 96 % -- -- None (Room air) Lying -- --    11/14/24 0115 -- 135 22 119/81 95 93 % -- -- -- -- -- --    11/14/24 0045 -- 80 16 91/73 73 98 % -- -- None (Room air) Lying -- --    11/14/24 0030 -- 72 22 87/60 69 98 % -- -- -- -- -- --    11/14/24 0013 -- 89 18 82/51 -- 94 % -- -- None (Room air) Lying -- --           CIWA-Ar Score       Row Name 11/15/24 0845 11/15/24 0745 11/15/24 03:40:51       CIWA-Ar    /77 -- --    Nausea and Vomiting 0 0 0    Tactile Disturbances 0 1 0    Tremor 2 4 4    Auditory Disturbances 0 0 0    Paroxysmal Sweats 0 0 0    Visual Disturbances 0 1 0    Anxiety 1 4 0    Headache, Fullness in Head 0 1 0    Agitation 1 1 0    Orientation and Clouding of Sensorium 0 0 0    CIWA-Ar Total 4 12 4      Row Name 11/15/24 8843  11/14/24 22:07:26 11/14/24 1800       CIWA-Ar    /70 -- --    Pulse 118 -- --    Nausea and Vomiting 0 0 0    Tactile Disturbances 0 0 0    Tremor 0 4 1    Auditory Disturbances 0 0 0    Paroxysmal Sweats 0 1 0    Visual Disturbances 0 0 0    Anxiety 0 0 0  Patient sleeping    Headache, Fullness in Head 0 0 0  Patient sleeping.  No complaints of headache.    Agitation 0 0 2    Orientation and Clouding of Sensorium 0  sleeping 0 0    CIWA-Ar Total 0 5 3      Row Name 11/14/24 1330 11/14/24 1200 11/14/24 0900       CIWA-Ar    Nausea and Vomiting 1 1 1    Tactile Disturbances 0 0 0    Tremor 1 1 1    Auditory Disturbances 0 0 0    Paroxysmal Sweats 0 0 0    Visual Disturbances 0 0 0    Anxiety 1 4 4    Headache, Fullness in Head 4 4 4    Agitation 1 1 0    Orientation and Clouding of Sensorium 0 0 0    CIWA-Ar Total 8 11 10                    Pertinent Labs/Diagnostic Test Results:   Radiology:  XR chest portable   Final Interpretation by Yevgeniy Lebron MD (11/15 0817)      No acute cardiopulmonary disease.            Workstation performed: [id number]         CT head without contrast   Final Interpretation by Bhavin Carnes MD (11/14 0145)      No acute intracranial abnormality.                  Workstation performed: [id number]         CT spine cervical without contrast   Final Interpretation by Bhavin Carnes MD (11/14 0148)      No acute cervical spine fracture or traumatic malalignment.                  Workstation performed: [id number]         CT chest abdomen pelvis wo contrast   Final Interpretation by Bhavin Carnes MD (11/14 0154)         1. Mildly displaced left lateral seventh rib fracture.   2. No hemothorax or pneumothorax.   3. No acute trauma in the abdomen or pelvis.               Workstation performed: [id number]           Cardiology:  ECG 12 lead    by Krish, Ris Results In (11/15 0915)      ECG 12 lead   Final Result by Carley Hernandez MD (11/15 9911)   Atrial flutter  with 2:1 A-V conduction   ST & T wave abnormality, consider inferior ischemia   Abnormal ECG   When compared with ECG of 15-Nov-2024 00:01, (unconfirmed)   No significant change was found   Confirmed by Carley Hernandez ([id number]) on 11/15/2024 7:53:05 AM      ECG 12 lead   Final Result by Carley Hernandez MD (11/14 0820)   Atrial flutter with variable A-V block   Rightward axis   Incomplete right bundle branch block   Abnormal ECG   Confirmed by Carley Hernandez ([id number]) on 11/14/2024 8:20:32 AM      ECG 12 lead   Final Result by Carley Hernandez MD (11/14 0819)   Atrial flutter with variable A-V block   Right axis deviation   Incomplete right bundle branch block   ST & T wave abnormality, consider inferior ischemia   Abnormal ECG      Confirmed by Carley Hernandez ([id number]) on 11/14/2024 8:19:24 AM      ECG 12 lead   Final Result by Carley Hernandez MD (11/14 0818)   Atrial flutter with variable A-V block   Minimal voltage criteria for LVH, may be normal variant ( [employer] product    )   Abnormal ECG   Confirmed by Carley Hernandez ([id number]) on 11/14/2024 8:18:05 AM        GI:  No orders to display           Results from last 7 days   Lab Units 11/15/24  0623 11/14/24  0806 11/14/24  0032   WBC Thousand/uL 6.14 6.30 7.00   HEMOGLOBIN g/dL 10.4* 10.6* 12.6   HEMATOCRIT % 30.5* 31.6* 36.6   PLATELETS Thousands/uL 60* 74* 96*   TOTAL NEUT ABS Thousands/µL 4.15 4.13 5.46         Results from last 7 days   Lab Units 11/15/24  0623 11/15/24  0007 11/14/24  1937 11/14/24  1800 11/14/24  0806 11/14/24  0419 11/14/24  0226 11/14/24  0032   SODIUM mmol/L 131*  --   --  127* 134*  --  130* 130*   POTASSIUM mmol/L 3.9  --   --  4.4 3.6  --  3.5 3.7   CHLORIDE mmol/L 98  --   --  94* 98  --  96 89*   CO2 mmol/L 26  --   --  22 21  --  17* 20*   ANION GAP mmol/L 7  --   --  11 15*  --  17* 21*   BUN mg/dL 22  --   --  25 32*  --  35* 37*   CREATININE mg/dL 0.91  --   --  1.08 1.31*  --  1.44* 1.60*   EGFR ml/min/1.73sq m 90  --   --  73 57  --  51 45   CALCIUM  mg/dL 8.0*  --   --  7.4* 7.2*  --  6.4* 7.6*   CALCIUM, IONIZED mmol/L  --  1.08* 1.03*  --   --  0.85*  --   --    MAGNESIUM mg/dL 1.8*  --   --  1.6* 2.0  --  1.1*  --      Results from last 7 days   Lab Units 11/15/24  0623 11/14/24  0806 11/14/24  0226 11/14/24  0032   AST U/L 86* 57* 63* 82*   ALT U/L 44 33 35 46   ALK PHOS U/L 112* 97 102 133*   TOTAL PROTEIN g/dL 5.7* 5.8* 5.5* 6.9   ALBUMIN g/dL 3.1* 3.1* 3.0* 3.7   TOTAL BILIRUBIN mg/dL 0.89 0.59 0.54 0.74     Results from last 7 days   Lab Units 11/15/24  1120 11/15/24  0719 11/14/24  2228 11/14/24  1544 11/14/24  1410 11/14/24  1214 11/14/24  1007 11/14/24  0914 11/14/24  0710   POC GLUCOSE mg/dl 217* 135 123 170* 150* 139 89 82 87     Results from last 7 days   Lab Units 11/15/24  0623 11/14/24  1800 11/14/24  0806 11/14/24  0226 11/14/24  0032   GLUCOSE RANDOM mg/dL 127 350* 73 197* 71         Results from last 7 days   Lab Units 11/14/24  0032   HEMOGLOBIN A1C % 5.2   EAG mg/dl 103     BETA-HYDROXYBUTYRATE   Date Value Ref Range Status   01/22/2024 3.2 (H) <0.6 mmol/L Final          Results from last 7 days   Lab Units 11/14/24  0438   PH HALIE  7.291*   PCO2 HALIE mm Hg 38.3*   PO2 HALIE mm Hg 56.0*   HCO3 HALIE mmol/L 18.0*   BASE EXC HALIE mmol/L -7.9   O2 CONTENT HALIE ml/dL 13.5   O2 HGB, VENOUS % 79.8             Results from last 7 days   Lab Units 11/14/24  0419 11/14/24  0226 11/14/24  0032   HS TNI 0HR ng/L  --   --  38   HS TNI 2HR ng/L  --  31  --    HSTNI D2 ng/L  --  -7  --    HS TNI 4HR ng/L 33  --   --    HSTNI D4 ng/L -5  --   --          Results from last 7 days   Lab Units 11/14/24  0053   PROTIME seconds 15.1*   INR  1.13     Results from last 7 days   Lab Units 11/14/24  0806   TSH 3RD GENERATON uIU/mL 0.510         Results from last 7 days   Lab Units 11/14/24  0438   LACTIC ACID mmol/L 0.8             Results from last 7 days   Lab Units 11/15/24  0007   BNP pg/mL 1,150*                                     Results from last 7 days   Lab  Units 11/14/24  1154   CLARITY UA  Clear   COLOR UA  Yellow   SPEC GRAV UA  1.020   PH UA  5.5   GLUCOSE UA mg/dl Negative   KETONES UA mg/dl 10 (1+)*   BLOOD UA  Negative   PROTEIN UA mg/dl Trace*   NITRITE UA  Negative   BILIRUBIN UA  Negative   UROBILINOGEN UA (BE) mg/dl <2.0   LEUKOCYTES UA  Negative   WBC UA /hpf 0-1*   RBC UA /hpf None Seen   BACTERIA UA /hpf None Seen   EPITHELIAL CELLS WET PREP /hpf Occasional                 Results from last 7 days   Lab Units 11/14/24  0032   ETHANOL LVL mg/dL 381*                                   Past Medical History:   Diagnosis Date    Acute on chronic kidney failure  (HCC)     Alcohol withdrawal ([facility]) 06/07/2019    Atrial fibrillation (HCC)     Cancer (HCC)     prostate ca,had radiation    Cardiac disease     stents,then triple bypass    COPD (chronic obstructive pulmonary disease) ([facility])     Coronary artery disease     ETOH abuse     Heart failure (HCC)     History of heart surgery     says triple bypass [facility]    Hx of heart artery stent     2014    Hyperlipidemia     Hypertension     Hyponatremia 10/17/2019    Hypovolemic shock ([facility]) 12/22/2019    Lumbar spondylitis ([facility]) 10/13/2022    Metabolic acidosis, increased anion gap 04/21/2021    Nasal bone fracture 10/10/2022    Prostate CA ([facility])     S/P CABG x 3     2004    Sleep apnea      Present on Admission:   Acute kidney injury superimposed on chronic kidney disease  (HCC)   Alcohol abuse   Ambulatory dysfunction   Atrial fibrillation with RVR ([facility])   BPH (benign prostatic hyperplasia)   Chronic heart failure with preserved ejection fraction (HCC)   COPD (chronic obstructive pulmonary disease) ([facility])   Electrolyte abnormality   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 disease ([facility])   Thrombocytopenia ([facility])   High anion gap metabolic acidosis   Closed fracture of one rib of left side      Admitting Diagnosis: Atrial fibrillation (HCC) [I48.91]  Dehydration [E86.0]  Alcoholic ketoacidosis  [E87.29]  Alcohol intoxication (HCC) [F10.929]  Alcohol abuse [F10.10]  Hyponatremia [E87.1]  Rib fracture [S22.39XA]  Thrombocytopenia (HCC) [D69.6]  ENMA (acute kidney injury) (HCC) [N17.9]  Atrial fibrillation with rapid ventricular response (HCC) [I48.91]  Multiple abrasions [T07.XXXA]  Fall, initial encounter [W19.XXXA]  Age/Sex: 64 y.o. male    Network Utilization Review Department  ATTENTION: Please call with any questions or concerns to [phone] and carefully listen to the prompts so that you are directed to the right person. All voicemails are confidential.   For Discharge needs, contact Care Management DC Support Team at [phone] opt. 2  Send all requests for admission clinical reviews, approved or denied determinations and any other requests to dedicated fax number below belonging to the campus where the patient is receiving treatment. List of dedicated fax numbers for the Facilities:  FACILITY NAME UR FAX NUMBER   ADMISSION DENIALS (Administrative/Medical Necessity) [phone]   DISCHARGE SUPPORT TEAM (NETWORK) [phone]   PARENT CHILD HEALTH (Maternity/NICU/Pediatrics) [phone]   [facility] [phone]   [facility] [phone]   [facility] [phone]   [facility] [phone]   [facility] [phone]   [facility] [phone]   [facility] [phone]   [facility] [phone]   [facility] [phone]   [facility] [phone]   [facility] [phone]   [facility] [phone]

## 2024-11-15 NOTE — ASSESSMENT & PLAN NOTE
Lab Results   Component Value Date    EGFR 90 11/15/2024    EGFR 73 11/14/2024    EGFR 57 11/14/2024    CREATININE 0.91 11/15/2024    CREATININE 1.08 11/14/2024    CREATININE 1.31 (H) 11/14/2024   resolved with IV fluids

## 2024-11-15 NOTE — CONSULTS
Consultation - Cardiology   Name: Gregg Partida 64 y.o. male I MRN: [id number]  Unit/Bed#: 4 [place] 418-01 I Date of Admission: 11/14/2024   Date of Service: 11/15/2024 I Hospital Day: 1   Inpatient consult to Cardiology  Consult performed by: CHELSEA Diaz  Consult ordered by: Pamela Esquivel MD        Physician Requesting Evaluation: Brandy Connors DO   Reason for Evaluation / Principal Problem: rapid atrial fibrillation, alcohol intoxication    Assessment & Plan  Atrial fibrillation with RVR (HCC)  patient with history of recurrent atrial fibrillation  8/16/2024 TTE: EF visibly estimated 65% with severe dilatation of left atrium  unclear last time patient took his medications secondary to intoxication  telemetry demonstrates rapid atrial fibrillation rates 130s to 160s  patient started on Cardizem drip 11/15/2024  continue Lopressor 50 mg Q 12 hours  continue Eliquis 5 mg bid  would avoid amiodarone secondary to patient's age and history of chronic alcoholism  continue monitor telemetry  Alcohol abuse  alcohol on admission was 381  CIWA protocol ongoing  Chronic heart failure with preserved ejection fraction (HCC)  Wt Readings from Last 3 Encounters:   11/15/24 83.9 kg (185 lb)   09/19/24 86.2 kg (190 lb 1.6 oz)   09/16/24 83.3 kg (183 lb 10.3 oz)   8/16/2024 TTE: EF visibly estimated 65% with severe left atrial dilatation  BNP 1150  mild volume overload secondary to tachycardia  Will give Lasix 40 mg IV times one and continue to monitor  continue Lopressor 50 mg Q 12 hours  monitor I and O, daily weights and labs  CHF education          COPD (chronic obstructive pulmonary disease) ([facility])  continue current inhaler regimen per primary team  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 disease ([facility])  Lab Results   Component Value Date    HGBA1C 5.2 11/14/2024       Recent Labs     11/14/24  1410 11/14/24  1544 11/14/24  2228 11/15/24  0719   POCGLU 150* 170* 123 135       Blood Sugar  Average: Last 72 hrs:  (P) 121.875  managed per primary team  Acute kidney injury superimposed on chronic kidney disease  (HCC)  Lab Results   Component Value Date    EGFR 90 11/15/2024    EGFR 73 11/14/2024    EGFR 57 11/14/2024    CREATININE 0.91 11/15/2024    CREATININE 1.08 11/14/2024    CREATININE 1.31 (H) 11/14/2024   resolved with IV fluids      History of Present Illness   Gregg Partida is a 64 y.o. male who was initially admitted on 11/14/2024 to the intensive care unit due to high anion gap metabolic acidosis and alcohol intoxication. Patient was noted to be in rapid atrial fibrillation. Patient has long-standing history of rapid atrial fibrillation, chronic alcohol abuse, coronary artery disease with previous CABG and noncompliance with medications.    Last night, patient was transferred out of the intensive care unit. Overnight heart rates once again elevated into the 130s to 160s. Patients BNP was greater than 1000 and most likely reflective of his chronic illness and also mild volume overload due to tachycardia.    Patient has history for coronary artery disease with CABG times three in 2024 and PCI the LAD in 2014, hypertension, diabetes, obstructive sleep apnea not use CPAP, chronic alcohol abuse, tobacco abuse, and atrial fibrillation    2D echocardiogram performed on 8/16/2024 demonstrates an EF of 65%, and left atrium was severely dilated.    Review of Systems   Unable to perform ROS: Acuity of condition (Patient states no questions regarding review of systems)     I have reviewed the patient's PMH, PSH, Social History, Family History, Meds, and Allergies  Historical Information   Past Medical History:   Diagnosis Date    Acute on chronic kidney failure  (HCC)     Alcohol withdrawal (HCC) 06/07/2019    Atrial fibrillation (HCC)     Cancer (HCC)     prostate ca,had radiation    Cardiac disease     stents,then triple bypass    COPD (chronic obstructive pulmonary disease) (HCC)     Coronary artery  disease     ETOH abuse     Heart failure (HCC)     History of heart surgery     says [facility] bypass [facility]    Hx of heart artery stent     2014    Hyperlipidemia     Hypertension     Hyponatremia 10/17/2019    Hypovolemic shock ([facility]) 12/22/2019    Lumbar spondylitis ([facility]) 10/13/2022    Metabolic acidosis, increased anion gap 04/21/2021    Nasal bone fracture 10/10/2022    Prostate CA ([facility])     S/P CABG x 3     2004    Sleep apnea      Past Surgical History:   Procedure Laterality Date    CARDIAC CATHETERIZATION      2 stents    CORONARY ARTERY BYPASS GRAFT  2004    ND ARTHRD ANT INTERBODY MIN DSC CRV BELOW C2 N/A 12/16/2020    Procedure: Anterior cervical discectomy with fusion C4-C7; Posterior cervical decompression and fusion C2-T2;  Surgeon: David Rowell MD;  Location: [facility];  Service: Neurosurgery    TONSILLECTOMY       Social History     Tobacco Use    Smoking status: Every Day     Current packs/day: 1.50     Average packs/day: 1.5 packs/day for 40.0 years (60.0 ttl pk-yrs)     Types: Cigarettes    Smokeless tobacco: Never   Vaping Use    Vaping status: Never Used   Substance and Sexual Activity    Alcohol use: Yes     Alcohol/week: 4.0 standard drinks of alcohol     Types: 4 Standard drinks or equivalent per week     Comment: quart of vodka daily    Drug use: No    Sexual activity: Not Currently     E-Cigarette/Vaping    E-Cigarette Use Never User      E-Cigarette/Vaping Substances    Nicotine Yes     THC No     CBD No     Flavoring No     Other No     Unknown No      Family History   Problem Relation Age of Onset    Diabetes Mother     Uterine cancer Mother     COPD Father     Hypertension Father      Social History     Tobacco Use    Smoking status: Every Day     Current packs/day: 1.50     Average packs/day: 1.5 packs/day for 40.0 years (60.0 ttl pk-yrs)     Types: Cigarettes    Smokeless tobacco: Never   Vaping Use    Vaping status: Never Used   Substance and Sexual Activity    Alcohol use:  Yes     Alcohol/week: 4.0 standard drinks of alcohol     Types: 4 Standard drinks or equivalent per week     Comment: quart of vodka daily    Drug use: No    Sexual activity: Not Currently       Current Facility-Administered Medications:     acetaminophen (TYLENOL) tablet 650 mg, Q6H PRN    apixaban (ELIQUIS) tablet 5 mg, BID    diltiazem (CARDIZEM) 125 mg in sodium chloride 0.9 % 125 mL infusion, Titrated, Last Rate: 7.5 mg/hr (11/15/24 0840)    ferrous sulfate tablet 325 mg, Daily With Breakfast    fluticasone-vilanterol 200-25 mcg/actuation 1 puff, Daily    folic acid (FOLVITE) tablet 1,000 mcg, Daily    furosemide (LASIX) injection 20 mg, Once    gabapentin (NEURONTIN) capsule 300 mg, TID    insulin lispro (HumALOG/ADMELOG) 100 units/mL subcutaneous injection 1-6 Units, 4x Daily (AC & HS) **AND** Fingerstick Glucose (POCT), 4x Daily AC and at bedtime    levalbuterol (XOPENEX) inhalation solution 1.25 mg, Q8H PRN    magnesium Oxide (MAG-OX) tablet 400 mg, BID    metoprolol tartrate (LOPRESSOR) tablet 50 mg, Q12H SEDA    nicotine (NICODERM CQ) 14 mg/24hr TD 24 hr patch 1 patch, Daily    pantoprazole (PROTONIX) EC tablet 40 mg, BID AC    predniSONE tablet 40 mg, Daily    senna-docusate sodium (SENOKOT S) 8.6-50 mg per tablet 1 tablet, Daily PRN    tamsulosin (FLOMAX) capsule 0.4 mg, Daily    thiamine tablet 100 mg, Daily    trimethobenzamide (TIGAN) IM injection 200 mg, Q6H PRN  Prior to Admission Medications   Prescriptions Last Dose Informant Patient Reported? Taking?   Blood Glucose Monitoring Suppl (ONE TOUCH ULTRA MINI) w/Device KIT Not Taking Self Yes No   Sig: Use as directed   Patient not taking: Reported on 3/25/2024   Blood Pressure Monitoring (Adult Blood Pressure Cuff Lg) KIT Not Taking  No No   Sig: Use in the morning   Patient not taking: Reported on 11/14/2024   Breo Ellipta 200-25 MCG/ACT inhaler Unknown Self No No   Sig: Inhale 1 puff daily Rinse mouth after use.   ONETOUCH DELICA LANCETS FINE MISC  Unknown Self Yes No   Sig: 3 (three) times a day Test   Thiamine HCl (vitamin B-1) 100 MG TABS Unknown Self No No   Sig: TAKE 1 TABLET DAILY   albuterol (Ventolin HFA) 90 mcg/act inhaler Unknown Self No No   Sig: Inhale 2 puffs every 4 (four) hours as needed for wheezing   aluminum-magnesium hydroxide-simethicone (MAALOX) 9785-0879-912 mg/30 mL suspension Unknown  No No   Sig: Take 30 mL by mouth every 4 (four) hours as needed for indigestion or heartburn   apixaban (Eliquis) 5 mg Unknown  No No   Sig: TAKE 1 TABLET BY MOUTH 2 TIMES A DAY DO NOT START BEFORE JANUARY 31, 2024.   aspirin (ECOTRIN LOW STRENGTH) 81 mg EC tablet Unknown Self Yes No   Sig: Take 81 mg by mouth daily   ferrous sulfate 325 (65 Fe) mg tablet Unknown Self No No   Sig: Take 1 tablet (325 mg total) by mouth daily with breakfast   folic acid (FOLVITE) 1 mg tablet Unknown  No No   Sig: TAKE 1 TABLET DAILY   gabapentin (NEURONTIN) 300 mg capsule Unknown  No No   Sig: TAKE ONE CAPSULE THREE TIMES DAILY   lidocaine (Lidoderm) 5 % Unknown  No No   Sig: Apply 1 patch topically over 12 hours daily Remove & Discard patch within 12 hours or as directed by MD   magnesium Oxide (MAG-OX) 400 mg TABS Unknown  No No   Sig: Take 1 tablet (400 mg total) by mouth 2 (two) times a day   metFORMIN (GLUCOPHAGE) 500 mg tablet Unknown  No No   Sig: TAKE 1 TABLET DAILY WITH BREAKFAST   metoprolol tartrate (LOPRESSOR) 50 mg tablet Unknown  No No   Sig: TAKE 1 TABLET BY MOUTH EVERY 12 HOURS   naltrexone (REVIA) 50 mg tablet Unknown  No No   Sig: Take 1 tablet (50 mg total) by mouth daily   naproxen (Naprosyn) 500 mg tablet Unknown  No No   Sig: Take 1 tablet (500 mg total) by mouth 2 (two) times a day with meals   nicotine (NICODERM CQ) 21 mg/24 hr TD 24 hr patch Unknown Self No No   Sig: Place 1 patch on the skin over 24 hours daily Do not start before December 4, 2023.   pantoprazole (PROTONIX) 40 mg tablet Unknown  No No   Sig: TAKE 1 TABLET TWO TIMES A DAY    ranolazine (RANEXA) 500 mg 12 hr tablet Unknown  No No   Sig: TAKE 1 TABLET EVERY 12 HOURS   senna-docusate sodium (SENOKOT S) 8.6-50 mg per tablet Unknown  No No   Sig: Take 1 tablet by mouth daily as needed for constipation   tamsulosin (FLOMAX) 0.4 mg Unknown  No No   Sig: TAKE 1 CAPSULE DAILY      Facility-Administered Medications: None     Patient has no known allergies.    Objective :  Temp:  [98 °F (36.7 °C)-99 °F (37.2 °C)] 98.5 °F (36.9 °C)  HR:  [] 163  BP: (102-163)/() 124/86  Resp:  [18-28] 18  SpO2:  [89 %-99 %] 92 %  O2 Device: Nasal cannula  Nasal Cannula O2 Flow Rate (L/min):  [2 L/min] 2 L/min  Orthostatic Blood Pressures      Flowsheet Row Most Recent Value   Blood Pressure 124/86 filed at 11/15/2024 0858   Patient Position - Orthostatic VS Lying filed at 11/14/2024 1200          First Weight: Weight - Scale: 84.3 kg (185 lb 13.6 oz) (11/14/24 0900)  Vitals:    11/14/24 0900 11/15/24 0600   Weight: 84.3 kg (185 lb 13.6 oz) 83.9 kg (185 lb)       Physical Exam  Vitals and nursing note reviewed.   Constitutional:       Appearance: He is ill-appearing (Chronically).   HENT:      Right Ear: External ear normal.      Left Ear: External ear normal.   Eyes:      General: No scleral icterus.        Right eye: No discharge.         Left eye: No discharge.   Cardiovascular:      Rate and Rhythm: Tachycardia present. Rhythm irregular.      Pulses: Normal pulses.   Pulmonary:      Effort: Pulmonary effort is normal.      Breath sounds: Examination of the right-lower field reveals decreased breath sounds. Examination of the left-lower field reveals decreased breath sounds. Decreased breath sounds present.   Abdominal:      General: Bowel sounds are normal. There is no distension.      Palpations: Abdomen is soft.   Musculoskeletal:      Right lower leg: No edema.      Left lower leg: No edema.   Skin:     General: Skin is warm and dry.      Capillary Refill: Capillary refill takes less than 2  seconds.      Comments: Scattered ecchymosis on all four extremities   Neurological:      General: No focal deficit present.      Mental Status: Mental status is at baseline.   Psychiatric:         Mood and Affect: Mood normal.           Lab Results: I have reviewed the following results:  Results from last 7 days   Lab Units 11/15/24  0623 11/14/24  0806 11/14/24  0032   WBC Thousand/uL 6.14 6.30 7.00   HEMOGLOBIN g/dL 10.4* 10.6* 12.6   HEMATOCRIT % 30.5* 31.6* 36.6   PLATELETS Thousands/uL 60* 74* 96*     Results from last 7 days   Lab Units 11/15/24  0623 11/14/24  1800 11/14/24  0806   POTASSIUM mmol/L 3.9 4.4 3.6   CHLORIDE mmol/L 98 94* 98   CO2 mmol/L 26 22 21   BUN mg/dL 22 25 32*   CREATININE mg/dL 0.91 1.08 1.31*   CALCIUM mg/dL 8.0* 7.4* 7.2*     Results from last 7 days   Lab Units 11/14/24  0053   INR  1.13     Lab Results   Component Value Date    HGBA1C 5.2 11/14/2024     Lab Results   Component Value Date    CKTOTAL 258 09/14/2024    CKMB 2.0 04/22/2021    CKMBINDEX <1.0 04/22/2021    TROPONINI <0.02 06/25/2021       Telemetry: rapid atrial fibrillation      VTE Prophylaxis: VTE covered by:  apixaban, Oral, 5 mg at 11/15/24 0803       Abigail TRAN  Cardiology

## 2024-11-15 NOTE — PLAN OF CARE
Problem: PAIN - ADULT  Goal: Verbalizes/displays adequate comfort level or baseline comfort level  Description: Interventions:  - Encourage patient to monitor pain and request assistance  - Assess pain using appropriate pain scale  - Administer analgesics based on type and severity of pain and evaluate response  - Implement non-pharmacological measures as appropriate and evaluate response  - Consider cultural and social influences on pain and pain management  - Notify physician/advanced practitioner if interventions unsuccessful or patient reports new pain  Outcome: Progressing     Problem: INFECTION - ADULT  Goal: Absence or prevention of progression during hospitalization  Description: INTERVENTIONS:  - Assess and monitor for signs and symptoms of infection  - Monitor lab/diagnostic results  - Monitor all insertion sites, i.e. indwelling lines, tubes, and drains  - Monitor endotracheal if appropriate and nasal secretions for changes in amount and color  - [place] appropriate cooling/warming therapies per order  - Administer medications as ordered  - Instruct and encourage patient and family to use good hand hygiene technique  - Identify and instruct in appropriate isolation precautions for identified infection/condition  Outcome: Progressing  Goal: Absence of fever/infection during neutropenic period  Description: INTERVENTIONS:  - Monitor WBC    Outcome: Progressing     Problem: SAFETY ADULT  Goal: Patient will remain free of falls  Description: INTERVENTIONS:  - Educate patient/family on patient safety including physical limitations  - Instruct patient to call for assistance with activity   - Consult OT/PT to assist with strengthening/mobility   - Keep Call bell within reach  - Keep bed low and locked with side rails adjusted as appropriate  - Keep care items and personal belongings within reach  - Initiate and maintain comfort rounds  - Make Fall Risk Sign visible to staff  - Offer Toileting every 4 Hours,  in advance of need  - Initiate/Maintain bed/chair alarm  - Apply yellow socks and bracelet for high fall risk patients  - Consider moving patient to room near nurses station  Outcome: Progressing  Goal: Maintain or return to baseline ADL function  Description: INTERVENTIONS:  -  Assess patient's ability to carry out ADLs; assess patient's baseline for ADL function and identify physical deficits which impact ability to perform ADLs (bathing, care of mouth/teeth, toileting, grooming, dressing, etc.)  - Assess/evaluate cause of self-care deficits   - Assess range of motion  - Assess patient's mobility; develop plan if impaired  - Assess patient's need for assistive devices and provide as appropriate  - Encourage maximum independence but intervene and supervise when necessary  - Involve family in performance of ADLs  - Assess for home care needs following discharge   - Consider OT consult to assist with ADL evaluation and planning for discharge  - Provide patient education as appropriate  Outcome: Progressing  Goal: Maintains/Returns to pre admission functional level  Description: INTERVENTIONS:  - Perform AM-PAC 6 Click Basic Mobility/ Daily Activity assessment daily.  - Set and communicate daily mobility goal to care team and patient/family/caregiver.   - Collaborate with rehabilitation services on mobility goals if consulted  - Perform Range of Motion 3 times a day.  - Reposition patient every 2 hours.  - Dangle patient 3 times a day  - Stand patient 3 times a day  - Ambulate patient 2 times a day  - Out of bed to chair 3 times a day   - Out of bed for meals 3 times a day  - Out of bed for toileting  - Record patient progress and toleration of activity level   Outcome: Progressing     Problem: DISCHARGE PLANNING  Goal: Discharge to home or other facility with appropriate resources  Description: INTERVENTIONS:  - Identify barriers to discharge w/patient and caregiver  - Arrange for needed discharge resources and  transportation as appropriate  - Identify discharge learning needs (meds, wound care, etc.)  - Arrange for interpretive services to assist at discharge as needed  - Refer to Case Management Department for coordinating discharge planning if the patient needs post-hospital services based on physician/advanced practitioner order or complex needs related to functional status, cognitive ability, or social support system  Outcome: Progressing     Problem: Knowledge Deficit  Goal: Patient/family/caregiver demonstrates understanding of disease process, treatment plan, medications, and discharge instructions  Description: Complete learning assessment and assess knowledge base.  Interventions:  - Provide teaching at level of understanding  - Provide teaching via preferred learning methods  Outcome: Progressing     Problem: NEUROSENSORY - ADULT  Goal: Achieves stable or improved neurological status  Description: INTERVENTIONS  - Monitor and report changes in neurological status  - Monitor vital signs such as temperature, blood pressure, glucose, and any other labs ordered   - Initiate measures to prevent increased intracranial pressure  - Monitor for seizure activity and implement precautions if appropriate      Outcome: Progressing  Goal: Remains free of injury related to seizures activity  Description: INTERVENTIONS  - Maintain airway, patient safety  and administer oxygen as ordered  - Monitor patient for seizure activity, document and report duration and description of seizure to physician/advanced practitioner  - If seizure occurs,  ensure patient safety during seizure  - Reorient patient post seizure  - Seizure pads on all 4 side rails  - Instruct patient/family to notify RN of any seizure activity including if an aura is experienced  - Instruct patient/family to call for assistance with activity based on nursing assessment  - Administer anti-seizure medications if ordered    Outcome: Progressing  Goal: Achieves maximal  functionality and self care  Description: INTERVENTIONS  - Monitor swallowing and airway patency with patient fatigue and changes in neurological status  - Encourage and assist patient to increase activity and self care.   - Encourage visually impaired, hearing impaired and aphasic patients to use assistive/communication devices  Outcome: Progressing     Problem: CARDIOVASCULAR - ADULT  Goal: Maintains optimal cardiac output and hemodynamic stability  Description: INTERVENTIONS:  - Monitor I/O, vital signs and rhythm  - Monitor for S/S and trends of decreased cardiac output  - Administer and titrate ordered vasoactive medications to optimize hemodynamic stability  - Assess quality of pulses, skin color and temperature  - Assess for signs of decreased coronary artery perfusion  - Instruct patient to report change in severity of symptoms  Outcome: Progressing     Problem: RESPIRATORY - ADULT  Goal: Achieves optimal ventilation and oxygenation  Description: INTERVENTIONS:  - Assess for changes in respiratory status  - Assess for changes in mentation and behavior  - Position to facilitate oxygenation and minimize respiratory effort  - Oxygen administered by appropriate delivery if ordered  - Initiate smoking cessation education as indicated  - Encourage broncho-pulmonary hygiene including cough, deep breathe, Incentive Spirometry  - Assess the need for suctioning and aspirate as needed  - Assess and instruct to report SOB or any respiratory difficulty  - Respiratory Therapy support as indicated  Outcome: Progressing     Problem: GASTROINTESTINAL - ADULT  Goal: Minimal or absence of nausea and/or vomiting  Description: INTERVENTIONS:  - Administer IV fluids if ordered to ensure adequate hydration  - Maintain NPO status until nausea and vomiting are resolved  - Nasogastric tube if ordered  - Administer ordered antiemetic medications as needed  - Provide nonpharmacologic comfort measures as appropriate  - Advance diet as  tolerated, if ordered  - Consider nutrition services referral to assist patient with adequate nutrition and appropriate food choices  Outcome: Progressing  Goal: Maintains adequate nutritional intake  Description: INTERVENTIONS:  - Monitor percentage of each meal consumed  - Identify factors contributing to decreased intake, treat as appropriate  - Assist with meals as needed  - Monitor I&O, weight, and lab values if indicated  - Obtain nutrition services referral as needed  Outcome: Progressing     Problem: GENITOURINARY - ADULT  Goal: Maintains or returns to baseline urinary function  Description: INTERVENTIONS:  - Assess urinary function  - Encourage oral fluids to ensure adequate hydration if ordered  - Administer IV fluids as ordered to ensure adequate hydration  - Administer ordered medications as needed  - Offer frequent toileting  - Follow urinary retention protocol if ordered  Outcome: Progressing     Problem: METABOLIC, FLUID AND ELECTROLYTES - ADULT  Goal: Electrolytes maintained within normal limits  Description: INTERVENTIONS:  - Monitor labs and assess patient for signs and symptoms of electrolyte imbalances  - Administer electrolyte replacement as ordered  - Monitor response to electrolyte replacements, including repeat lab results as appropriate  - Instruct patient on fluid and nutrition as appropriate  Outcome: Progressing  Goal: Fluid balance maintained  Description: INTERVENTIONS:  - Monitor labs   - Monitor I/O and WT  - Instruct patient on fluid and nutrition as appropriate  - Assess for signs & symptoms of volume excess or deficit  Outcome: Progressing  Goal: Glucose maintained within target range  Description: INTERVENTIONS:  - Monitor Blood Glucose as ordered  - Assess for signs and symptoms of hyperglycemia and hypoglycemia  - Administer ordered medications to maintain glucose within target range  - Assess nutritional intake and initiate nutrition service referral as needed  Outcome:  Progressing     Problem: HEMATOLOGIC - ADULT  Goal: Maintains hematologic stability  Description: INTERVENTIONS  - Assess for signs and symptoms of bleeding or hemorrhage  - Monitor labs  - Administer supportive blood products/factors as ordered and appropriate  Outcome: Progressing     Problem: MUSCULOSKELETAL - ADULT  Goal: Maintain or return mobility to safest level of function  Description: INTERVENTIONS:  - Assess patient's ability to carry out ADLs; assess patient's baseline for ADL function and identify physical deficits which impact ability to perform ADLs (bathing, care of mouth/teeth, toileting, grooming, dressing, etc.)  - Assess/evaluate cause of self-care deficits   - Assess range of motion  - Assess patient's mobility  - Assess patient's need for assistive devices and provide as appropriate  - Encourage maximum independence but intervene and supervise when necessary  - Involve family in performance of ADLs  - Assess for home care needs following discharge   - Consider OT consult to assist with ADL evaluation and planning for discharge  - Provide patient education as appropriate  Outcome: Progressing     Problem: Nutrition/Hydration-ADULT  Goal: Nutrient/Hydration intake appropriate for improving, restoring or maintaining nutritional needs  Description: Monitor and assess patient's nutrition/hydration status for malnutrition. Collaborate with interdisciplinary team and initiate plan and interventions as ordered.  Monitor patient's weight and dietary intake as ordered or per policy. Utilize nutrition screening tool and intervene as necessary. Determine patient's food preferences and provide high-protein, high-caloric foods as appropriate.     INTERVENTIONS:  - Monitor oral intake, urinary output, labs, and treatment plans  - Assess nutrition and hydration status and recommend course of action  - Evaluate amount of meals eaten  - Assist patient with eating if necessary   - Allow adequate time for meals  -  Recommend/ encourage appropriate diets, oral nutritional supplements, and vitamin/mineral supplements  - Order, calculate, and assess calorie counts as needed  - Recommend, monitor, and adjust tube feedings and TPN/PPN based on assessed needs  - Assess need for intravenous fluids  - Provide specific nutrition/hydration education as appropriate  - Include patient/family/caregiver in decisions related to nutrition  Outcome: Progressing     Problem: Prexisting or High Potential for Compromised Skin Integrity  Goal: Skin integrity is maintained or improved  Description: INTERVENTIONS:  - Identify patients at risk for skin breakdown  - Assess and monitor skin integrity  - Assess and monitor nutrition and hydration status  - Monitor labs   - Assess for incontinence   - Turn and reposition patient  - Assist with mobility/ambulation  - Relieve pressure over bony prominences  - Avoid friction and shearing  - Provide appropriate hygiene as needed including keeping skin clean and dry  - Evaluate need for skin moisturizer/barrier cream  - Collaborate with interdisciplinary team   - Patient/family teaching  - Consider wound care consult   Outcome: Progressing

## 2024-11-15 NOTE — ASSESSMENT & PLAN NOTE
Wt Readings from Last 3 Encounters:   11/15/24 83.9 kg (185 lb)   09/19/24 86.2 kg (190 lb 1.6 oz)   09/16/24 83.3 kg (183 lb 10.3 oz)   8/16/2024 TTE: EF visibly estimated 65% with severe left atrial dilatation  BNP 1150  mild volume overload secondary to tachycardia  Will give Lasix 40 mg IV times one and continue to monitor  continue Lopressor 50 mg Q 12 hours  monitor I and O, daily weights and labs  CHF education

## 2024-11-16 ENCOUNTER — APPOINTMENT (INPATIENT)
Dept: RADIOLOGY | Facility: HOSPITAL | Age: 64
DRG: 308 | End: 2024-11-16
Payer: COMMERCIAL

## 2024-11-16 PROBLEM — F10.939 ALCOHOL WITHDRAWAL (HCC): Status: ACTIVE | Noted: 2024-03-25

## 2024-11-16 PROBLEM — E87.29 HIGH ANION GAP METABOLIC ACIDOSIS: Status: RESOLVED | Noted: 2021-04-21 | Resolved: 2024-11-16

## 2024-11-16 PROBLEM — E87.1 HYPONATREMIA: Status: ACTIVE | Noted: 2024-11-16

## 2024-11-16 PROBLEM — N18.9 ACUTE KIDNEY INJURY SUPERIMPOSED ON CHRONIC KIDNEY DISEASE  (HCC): Status: RESOLVED | Noted: 2024-02-10 | Resolved: 2024-11-16

## 2024-11-16 PROBLEM — G93.40 ENCEPHALOPATHY ACUTE: Status: ACTIVE | Noted: 2021-04-21

## 2024-11-16 PROBLEM — N17.9 ACUTE KIDNEY INJURY SUPERIMPOSED ON CHRONIC KIDNEY DISEASE  (HCC): Status: RESOLVED | Noted: 2024-02-10 | Resolved: 2024-11-16

## 2024-11-16 LAB
ALBUMIN SERPL BCG-MCNC: 3.1 G/DL (ref 3.5–5)
ALBUMIN SERPL BCG-MCNC: 3.2 G/DL (ref 3.5–5)
ALP SERPL-CCNC: 111 U/L (ref 34–104)
ALP SERPL-CCNC: 119 U/L (ref 34–104)
ALT SERPL W P-5'-P-CCNC: 102 U/L (ref 7–52)
ALT SERPL W P-5'-P-CCNC: 81 U/L (ref 7–52)
AMMONIA PLAS-SCNC: 36 UMOL/L (ref 18–72)
ANION GAP SERPL CALCULATED.3IONS-SCNC: 6 MMOL/L (ref 4–13)
ANION GAP SERPL CALCULATED.3IONS-SCNC: 7 MMOL/L (ref 4–13)
AST SERPL W P-5'-P-CCNC: 102 U/L (ref 13–39)
AST SERPL W P-5'-P-CCNC: 228 U/L (ref 13–39)
BASOPHILS # BLD AUTO: 0.01 THOUSANDS/ÂΜL (ref 0–0.1)
BASOPHILS NFR BLD AUTO: 0 % (ref 0–1)
BILIRUB SERPL-MCNC: 0.65 MG/DL (ref 0.2–1)
BILIRUB SERPL-MCNC: 0.8 MG/DL (ref 0.2–1)
BUN SERPL-MCNC: 23 MG/DL (ref 5–25)
BUN SERPL-MCNC: 30 MG/DL (ref 5–25)
CALCIUM ALBUM COR SERPL-MCNC: 8.9 MG/DL (ref 8.3–10.1)
CALCIUM ALBUM COR SERPL-MCNC: 9 MG/DL (ref 8.3–10.1)
CALCIUM SERPL-MCNC: 8.3 MG/DL (ref 8.4–10.2)
CALCIUM SERPL-MCNC: 8.3 MG/DL (ref 8.4–10.2)
CHLORIDE SERPL-SCNC: 94 MMOL/L (ref 96–108)
CHLORIDE SERPL-SCNC: 97 MMOL/L (ref 96–108)
CO2 SERPL-SCNC: 26 MMOL/L (ref 21–32)
CO2 SERPL-SCNC: 29 MMOL/L (ref 21–32)
CREAT SERPL-MCNC: 0.82 MG/DL (ref 0.6–1.3)
CREAT SERPL-MCNC: 1.1 MG/DL (ref 0.6–1.3)
CREAT UR-MCNC: 55.4 MG/DL
EOSINOPHIL # BLD AUTO: 0 THOUSAND/ÂΜL (ref 0–0.61)
EOSINOPHIL NFR BLD AUTO: 0 % (ref 0–6)
ERYTHROCYTE [DISTWIDTH] IN BLOOD BY AUTOMATED COUNT: 16.2 % (ref 11.6–15.1)
GFR SERPL CREATININE-BSD FRML MDRD: 71 ML/MIN/1.73SQ M
GFR SERPL CREATININE-BSD FRML MDRD: 94 ML/MIN/1.73SQ M
GLUCOSE SERPL-MCNC: 109 MG/DL (ref 65–140)
GLUCOSE SERPL-MCNC: 111 MG/DL (ref 65–140)
GLUCOSE SERPL-MCNC: 118 MG/DL (ref 65–140)
GLUCOSE SERPL-MCNC: 124 MG/DL (ref 65–140)
GLUCOSE SERPL-MCNC: 138 MG/DL (ref 65–140)
GLUCOSE SERPL-MCNC: 99 MG/DL (ref 65–140)
HCT VFR BLD AUTO: 31.2 % (ref 36.5–49.3)
HGB BLD-MCNC: 10.3 G/DL (ref 12–17)
IMM GRANULOCYTES # BLD AUTO: 0.07 THOUSAND/UL (ref 0–0.2)
IMM GRANULOCYTES NFR BLD AUTO: 1 % (ref 0–2)
INR PPP: 1.07 (ref 0.85–1.19)
LYMPHOCYTES # BLD AUTO: 0.66 THOUSANDS/ÂΜL (ref 0.6–4.47)
LYMPHOCYTES NFR BLD AUTO: 10 % (ref 14–44)
MAGNESIUM SERPL-MCNC: 1.1 MG/DL (ref 1.9–2.7)
MAGNESIUM SERPL-MCNC: 1.6 MG/DL (ref 1.9–2.7)
MCH RBC QN AUTO: 32.9 PG (ref 26.8–34.3)
MCHC RBC AUTO-ENTMCNC: 33 G/DL (ref 31.4–37.4)
MCV RBC AUTO: 100 FL (ref 82–98)
MONOCYTES # BLD AUTO: 0.89 THOUSAND/ÂΜL (ref 0.17–1.22)
MONOCYTES NFR BLD AUTO: 14 % (ref 4–12)
NEUTROPHILS # BLD AUTO: 4.72 THOUSANDS/ÂΜL (ref 1.85–7.62)
NEUTS SEG NFR BLD AUTO: 75 % (ref 43–75)
NRBC BLD AUTO-RTO: 0 /100 WBCS
PLATELET # BLD AUTO: 73 THOUSANDS/UL (ref 149–390)
PMV BLD AUTO: 10.9 FL (ref 8.9–12.7)
POTASSIUM SERPL-SCNC: 4.1 MMOL/L (ref 3.5–5.3)
POTASSIUM SERPL-SCNC: 4.4 MMOL/L (ref 3.5–5.3)
PROT SERPL-MCNC: 5.7 G/DL (ref 6.4–8.4)
PROT SERPL-MCNC: 5.9 G/DL (ref 6.4–8.4)
PROTHROMBIN TIME: 14.4 SECONDS (ref 12.3–15)
RBC # BLD AUTO: 3.13 MILLION/UL (ref 3.88–5.62)
SODIUM 24H UR-SCNC: 35 MOL/L
SODIUM SERPL-SCNC: 127 MMOL/L (ref 135–147)
SODIUM SERPL-SCNC: 132 MMOL/L (ref 135–147)
WBC # BLD AUTO: 6.35 THOUSAND/UL (ref 4.31–10.16)

## 2024-11-16 PROCEDURE — 82948 REAGENT STRIP/BLOOD GLUCOSE: CPT

## 2024-11-16 PROCEDURE — 87040 BLOOD CULTURE FOR BACTERIA: CPT | Performed by: PHYSICIAN ASSISTANT

## 2024-11-16 PROCEDURE — 82570 ASSAY OF URINE CREATININE: CPT | Performed by: NURSE PRACTITIONER

## 2024-11-16 PROCEDURE — 85025 COMPLETE CBC W/AUTO DIFF WBC: CPT | Performed by: NURSE PRACTITIONER

## 2024-11-16 PROCEDURE — 71045 X-RAY EXAM CHEST 1 VIEW: CPT

## 2024-11-16 PROCEDURE — 85610 PROTHROMBIN TIME: CPT | Performed by: PHYSICIAN ASSISTANT

## 2024-11-16 PROCEDURE — 83735 ASSAY OF MAGNESIUM: CPT | Performed by: PHYSICIAN ASSISTANT

## 2024-11-16 PROCEDURE — 99291 CRITICAL CARE FIRST HOUR: CPT | Performed by: ANESTHESIOLOGY

## 2024-11-16 PROCEDURE — 84300 ASSAY OF URINE SODIUM: CPT | Performed by: NURSE PRACTITIONER

## 2024-11-16 PROCEDURE — 82140 ASSAY OF AMMONIA: CPT | Performed by: NURSE PRACTITIONER

## 2024-11-16 PROCEDURE — 80053 COMPREHEN METABOLIC PANEL: CPT | Performed by: NURSE PRACTITIONER

## 2024-11-16 PROCEDURE — 80053 COMPREHEN METABOLIC PANEL: CPT | Performed by: PHYSICIAN ASSISTANT

## 2024-11-16 PROCEDURE — 83735 ASSAY OF MAGNESIUM: CPT | Performed by: NURSE PRACTITIONER

## 2024-11-16 RX ORDER — DEXMEDETOMIDINE HYDROCHLORIDE 4 UG/ML
.1-1.2 INJECTION, SOLUTION INTRAVENOUS
Status: DISCONTINUED | OUTPATIENT
Start: 2024-11-16 | End: 2024-11-19

## 2024-11-16 RX ORDER — ENOXAPARIN SODIUM 100 MG/ML
1 INJECTION SUBCUTANEOUS EVERY 12 HOURS SCHEDULED
Status: DISCONTINUED | OUTPATIENT
Start: 2024-11-16 | End: 2024-11-17

## 2024-11-16 RX ORDER — PHENOBARBITAL SODIUM 65 MG/ML
130 INJECTION, SOLUTION INTRAMUSCULAR; INTRAVENOUS ONCE
Status: COMPLETED | OUTPATIENT
Start: 2024-11-16 | End: 2024-11-16

## 2024-11-16 RX ORDER — METOPROLOL TARTRATE 1 MG/ML
5 INJECTION, SOLUTION INTRAVENOUS EVERY 6 HOURS
Status: DISCONTINUED | OUTPATIENT
Start: 2024-11-16 | End: 2024-11-17

## 2024-11-16 RX ORDER — MAGNESIUM SULFATE HEPTAHYDRATE 40 MG/ML
2 INJECTION, SOLUTION INTRAVENOUS ONCE
Status: COMPLETED | OUTPATIENT
Start: 2024-11-16 | End: 2024-11-16

## 2024-11-16 RX ORDER — LORAZEPAM 1 MG/1
2 TABLET ORAL ONCE
Status: COMPLETED | OUTPATIENT
Start: 2024-11-16 | End: 2024-11-16

## 2024-11-16 RX ORDER — LORAZEPAM 2 MG/ML
4 INJECTION INTRAMUSCULAR ONCE
Status: COMPLETED | OUTPATIENT
Start: 2024-11-16 | End: 2024-11-16

## 2024-11-16 RX ORDER — MAGNESIUM SULFATE HEPTAHYDRATE 40 MG/ML
4 INJECTION, SOLUTION INTRAVENOUS ONCE
Status: COMPLETED | OUTPATIENT
Start: 2024-11-16 | End: 2024-11-16

## 2024-11-16 RX ORDER — SODIUM CHLORIDE 9 MG/ML
75 INJECTION, SOLUTION INTRAVENOUS CONTINUOUS
Status: DISCONTINUED | OUTPATIENT
Start: 2024-11-16 | End: 2024-11-17

## 2024-11-16 RX ADMIN — METOPROLOL TARTRATE 5 MG: 5 INJECTION INTRAVENOUS at 17:42

## 2024-11-16 RX ADMIN — MAGNESIUM SULFATE HEPTAHYDRATE 4 G: 40 INJECTION, SOLUTION INTRAVENOUS at 06:13

## 2024-11-16 RX ADMIN — LORAZEPAM 4 MG: 2 INJECTION INTRAMUSCULAR; INTRAVENOUS at 05:09

## 2024-11-16 RX ADMIN — METOPROLOL TARTRATE 5 MG: 5 INJECTION INTRAVENOUS at 11:23

## 2024-11-16 RX ADMIN — SODIUM CHLORIDE 75 ML/HR: 0.9 INJECTION, SOLUTION INTRAVENOUS at 21:05

## 2024-11-16 RX ADMIN — DEXMEDETOMIDINE HYDROCHLORIDE 0.4 MCG/KG/HR: 4 INJECTION, SOLUTION INTRAVENOUS at 06:05

## 2024-11-16 RX ADMIN — LORAZEPAM 2 MG: 1 TABLET ORAL at 03:39

## 2024-11-16 RX ADMIN — ENOXAPARIN SODIUM 90 MG: 100 INJECTION SUBCUTANEOUS at 21:05

## 2024-11-16 RX ADMIN — MAGNESIUM SULFATE HEPTAHYDRATE 2 G: 40 INJECTION, SOLUTION INTRAVENOUS at 18:34

## 2024-11-16 RX ADMIN — SODIUM CHLORIDE 100 ML/HR: 0.9 INJECTION, SOLUTION INTRAVENOUS at 11:10

## 2024-11-16 RX ADMIN — PHENOBARBITAL SODIUM 130 MG: 65 INJECTION INTRAMUSCULAR; INTRAVENOUS at 06:03

## 2024-11-16 RX ADMIN — NICOTINE 1 PATCH: 14 PATCH, EXTENDED RELEASE TRANSDERMAL at 11:29

## 2024-11-16 NOTE — ASSESSMENT & PLAN NOTE
Admitted on 11/14 and given phenobarbital load (260mg, 130mg, 130mg) with improved symptoms. Downgraded to the floor  Given multiple doses of Ativan over the last 24 hours per [facility] protocol. Given escalating doses of ativan this morning and therefore transitioned back to SD1 for close monitoring  Give 130mg of phenobarbitol now for alcohol withdrawal  Precedex gtt if needed  Continue thiamine/folic acid   LFTs slowly rising this AM, consider RUQ US  Encourage cessation

## 2024-11-16 NOTE — DISCHARGE INSTR - OTHER ORDERS
Wound Care Plan:    1-Cleanse wounds to right lateral arm with normal saline & pat dry. Apply betadine to wounds, cover with ABD, rolled gauze and tape. Change 3x/week & as needed for soilage/dislodgement.  2-Cleanse wound to left lateral arm and right knee with normal saline & pat dry then apply small foam dressing. Change 3x/week and as needed for soilage/dislodgement.  3-Apply Prevent barrier cream to bilateral sacrum, buttock and heels BID and PRN  4-Heel lift boots to be worn to bilateral heels at all times in bed and after transfer to reclining chair if able. If patient unable to tolerate, must float heels on 2 pillows to offload pressure so heels are not in contact with mattress or pillows.  5-Use pressure redistribution cushion in chair when out of bed if able. Avoid prolonged sitting.  6-Moisturize skin daily with skin nourishing cream.  7-Turn/reposition every 2 hrs for pressure re-distribution on skin.

## 2024-11-16 NOTE — UTILIZATION REVIEW
Continued Stay Review    Date: 11-16-24                           Current Patient Class: Inpatient Current Level of Care: transfer to ICU    HPI:64 y.o. male initially admitted on 11-14-24   Af with RVR     Assessment/Plan:     Ciwa =23.  More agitated and encephalopathic overnight requiring multiple ativan doses. This AM, he had an acute episode of agitation with hallucinations and returned to [facility] with RVR prompting critical care consultation.    New transaminitis.  Bun increased to 30. Mag 1.1. Alert and oriented to person and place.Temp 96.9, tachycardia 145- 160, tachycardia 26- 36, hypertension 189/123, hypoxia 83% ra.  Placed on 3L O2nc. Placed in non violent soft restraints for patient safety.   Continue telemetry.  Iv metoprolol q6 hr, iv cardizem gtt.   Received iv mag x1. Received iv phenobarbital 130 mg and iv precedex gtt. Continue iv fluids 100/hr.        Scheduled Medications:  apixaban, 5 mg, Oral, BID  ferrous sulfate, 325 mg, Oral, Daily With Breakfast  fluticasone-vilanterol, 1 puff, Inhalation, Daily  folic acid, 1,000 mcg, Oral, Daily  gabapentin, 300 mg, Oral, TID  insulin lispro, 1-6 Units, Subcutaneous, 4x Daily (AC & HS)  metoprolol, 5 mg, Intravenous, Q6H  nicotine, 1 patch, Transdermal, Daily  pantoprazole, 40 mg, Oral, BID AC  tamsulosin, 0.4 mg, Oral, Daily  vitamin B-1, 100 mg, Oral, Daily      Continuous IV Infusions:  dexmedetomidine, 0.1-0.7 mcg/kg/hr, Intravenous, Titrated  sodium chloride, 100 mL/hr, Intravenous, Continuous    diltiazem (CARDIZEM) 125 mg in sodium chloride 0.9 % 125 mL infusion  Rate: 1-15 mL/hr Dose: 1-15 mg/hr  Freq: Titrated Route: IV  Last Dose: Stopped (11/16/24 0630)  Start: 11/15/24 0700 End: 11/16/24 0630        PRN Meds:  acetaminophen, 650 mg, Oral, Q6H PRN  levalbuterol, 1.25 mg, Nebulization, Q8H PRN  senna-docusate sodium, 1 tablet, Oral, Daily PRN  trimethobenzamide, 200 mg, Intramuscular, Q6H PRN      Discharge Plan: to be determined     Vital  Signs (last 3 days)       Date/Time Temp Pulse Resp BP MAP  SpO2 Nasal Cannula O2 Flow Rate (L/min) Joseph Coma Scale Score CIWA-Ar Total Pain    11/16/24 1300 -- 145 17 163/99 126 98 % -- -- -- --    11/16/24 1200 -- 146 28 122/84 100 98 % -- -- -- --    11/16/24 1100 -- 81 12 135/92 110 99 % -- -- -- --    11/16/24 1000 -- 80 12 148/97 117 98 % -- -- -- --    11/16/24 0900 -- 112 11 148/100 119 97 % -- -- -- --    11/16/24 0800 -- 76 13 130/81 99 99 % -- -- -- No Pain    11/16/24 0700 96.9 °F   (36.1 °C) 77 14 117/85 98 99 % -- -- -- --    11/16/24 0645 -- 78 16 127/85 98 99 % -- -- -- --    11/16/24 0630 -- 84 19 130/86 105 83 % 3 L/min -- -- --    11/16/24 0615 -- 160 26 155/109 127 92 % -- -- -- --    11/16/24 0600 -- 160 29 150/113 128 98 % -- -- -- --    11/16/24 0554 -- 160 31 155/106 118 -- -- -- -- --    11/16/24 0545 -- 160 36 155/106 118 -- -- -- -- --    11/16/24 05:29:22 -- -- -- 189/123 145 -- -- -- -- --    11/16/24 04:40:07 -- 157 -- 165/111 129 95 % -- -- 23 --    11/16/24 04:06:53 -- 156 -- 178/108 131 95 % -- -- -- --    11/16/24 03:30:37 -- 79 -- 132/87 102 94 % -- -- 13 --    11/16/24 02:42:05 -- 87 -- 122/77 92 94 % -- -- -- --    11/16/24 0242 -- 78 -- 122/77 -- -- -- -- -- --    11/16/24 01:38:38 -- 84 -- 125/86 99 95 % -- -- -- --    11/16/24 00:40:33 -- 83 -- 128/71 90 94 % -- -- -- --    11/16/24 0000 -- -- -- -- -- -- -- -- 4 --    11/15/24 23:51:45 -- 80 -- 114/74 87 93 % -- -- -- --    11/15/24 2300 -- -- -- -- -- -- -- -- 12 --    11/15/24 2224 -- -- -- -- -- -- -- -- 12 --    11/15/24 22:23:12 -- 86 -- 140/72 95 95 % -- -- 12 --    11/15/24 22:22:16 97.9 °F   (36.6 °C) 129 22 140/72 95 94 % -- -- -- --    11/15/24 22:02:40 -- 76 -- 134/71 92 93 % -- -- -- --    11/15/24 20:53:18 -- 141 -- -- -- 95 % -- -- -- --    11/15/24 20:04:08 -- 84 -- 130/84 99 94 % -- -- 6 --    11/15/24 19:50:46 98.5 °F   (36.9 °C) 80 21 131/76 94 94 % -- -- -- --    11/15/24 1930 -- -- -- -- -- -- 2 L/min  15 -- 5    11/15/24 18:56:45 -- 120 -- 137/78 98 96 % -- -- -- --    11/15/24 1830 -- -- -- -- -- -- -- -- 13 --    11/15/24 1807 -- 98 -- -- -- -- -- -- -- --    11/15/24 1705 -- 111 -- -- -- -- -- -- -- --    11/15/24 1700 -- 87 -- 111/77 -- -- -- -- 4 --    11/15/24 16:58:08 -- 83 -- 111/77 88 97 % -- -- -- --    11/15/24 1540 -- -- -- -- -- -- -- -- -- 6    11/15/24 1451 98.5 °F   (36.9 °C) 80 -- 133/84 100 95 % -- -- -- --    11/15/24 1450 -- -- -- -- -- -- -- -- -- 5    11/15/24 1245 -- -- -- -- -- -- -- -- 4 --    11/15/24 09:58:33 -- 74 -- 110/66 81 93 % -- -- -- --    11/15/24 08:58:27 -- 163 -- 124/86 99 92 % -- -- -- --    11/15/24 0845 -- -- -- 142/77 -- -- -- -- 4 --    11/15/24 0815 -- 159 -- 154/98 117 89 % -- -- -- --    11/15/24 0800 -- -- -- -- -- -- 2 L/min -- -- --    11/15/24 0755 -- -- -- -- -- -- -- -- -- No Pain    11/15/24 0745 -- 157 -- 159/118 132 92 % -- -- 12 --    11/15/24 0715 98.5 °F   (36.9 °C) 159 -- 163/107 126 93 % -- -- -- --    11/15/24 03:40:51 -- 158 -- 141/98 112 91 % -- -- 4 --    11/15/24 0245 -- 118 -- 124/70 -- -- -- -- 0 --    11/15/24 00:49:44 -- 104 -- 125/69 88 91 % -- -- -- --    11/14/24 23:41:30 -- 159 -- 136/91 106 93 % -- -- -- --    11/14/24 22:07:26 99 °F   (37.2 °C) 159 -- 139/88 105 93 % -- -- 5 --    11/14/24 2100 -- -- -- -- -- -- -- -- -- No Pain    11/14/24 20:25:56 -- 81 -- 135/72 93 95 % -- -- -- --    11/14/24 19:07:44 98.3 °F   (36.8 °C) 161 18 148/103 118 94 % -- -- -- --    11/14/24 1800 -- -- -- -- -- -- -- -- 3 --    11/14/24 17:56:45 98.2 °F   (36.8 °C) 148 18 151/99 116 94 % -- -- -- --    11/14/24 16:29:52 98 °F   (36.7 °C) 88 19 127/72 90 92 % -- -- -- --    11/14/24 1545 98.7 °F   (37.1 °C) -- -- -- -- -- -- -- -- --    11/14/24 1500 -- 99 -- 116/66 85 97 % -- -- -- --    11/14/24 1400 -- 77 -- 127/62 87 97 % -- -- -- --    11/14/24 1330 -- 77 27 115/70 87 99 % -- -- 8 --    11/14/24 1300 -- 76 27 112/70 85 99 % -- -- -- --    11/14/24 1200  -- 76 19 102/64 78 98 % 2 L/min -- 11 4    11/14/24 1109 98.7 °F   (37.1 °C) -- -- -- -- -- -- -- -- --    11/14/24 1100 -- 77 28 107/63 80 99 % -- -- -- --    11/14/24 1000 -- 76 21 110/64 82 98 % -- -- -- --    11/14/24 0900 97.8 °F   (36.6 °C) 82 22 114/57 82 -- 2 L/min -- 10 6    11/14/24 0830 -- 78 19 121/68 89 97 % -- -- -- --    11/14/24 0815 -- 78 19 115/85 97 97 % -- -- -- --    11/14/24 0800 -- 78 13 121/69 89 97 % -- -- -- --    11/14/24 0745 -- 76 20 112/65 84 96 % -- -- -- --    11/14/24 0730 -- 73 20 105/63 79 98 % -- -- -- --    11/14/24 0715 -- 74 20 101/66 79 97 % -- -- -- --    11/14/24 0700 -- 97 30 98/57 72 96 % 2 L/min -- -- --    11/14/24 0645 -- 75 33 103/65 79 98 % 2 L/min -- -- --    11/14/24 0630 -- 129 29 101/58 77 97 % 2 L/min -- -- --    11/14/24 0625 -- 96 24 94/85 71 97 % 2 L/min -- -- --    11/14/24 0615 -- 97 24 98/53 69 96 % 2 L/min -- -- --    11/14/24 0600 -- 131 26 93/54 68 98 % 2 L/min -- -- --    11/14/24 0554 -- 142 22 83/50 62 97 % 2 L/min -- -- --    11/14/24 0545 -- 99 25 107/57 75 97 % 2 L/min -- -- --    11/14/24 0534 -- -- -- -- -- -- -- -- -- --    11/14/24 0530 -- 126 33 90/55 68 97 % 2 L/min -- -- --    11/14/24 0515 -- 137 24 89/50 64 96 % 2 L/min -- -- --    11/14/24 0445 -- 107 18 107/59 79 97 % -- -- -- --    11/14/24 0415 -- 130 18 101/59 77 93 % -- -- -- --    11/14/24 0400 -- 147 -- 119/63 -- -- -- -- -- --    11/14/24 0345 -- 132 18 99/55 70 94 % 2 L/min -- -- --    11/14/24 0330 -- 132 16 104/55 72 98 % 2 L/min -- -- --    11/14/24 0245 -- 116 16 91/59 69 94 % -- -- -- --    11/14/24 0230 -- 131 16 123/70 91 94 % -- -- -- --    11/14/24 0218 97.7 °F   (36.5 °C) -- -- -- -- -- -- -- -- --    11/14/24 0215 -- 140 16 99/64 77 93 % -- -- -- --    11/14/24 0200 -- 128 16 105/55 72 91 % -- -- -- --    11/14/24 0145 -- 135 18 119/55 77 92 % -- -- -- --    11/14/24 0130 -- 140 20 117/86 98 96 % -- -- -- --    11/14/24 0115 -- 135 22 119/81 95 93 % -- -- -- --     11/14/24 0045 -- 80 16 91/73 73 98 % -- -- -- --    11/14/24 0030 -- 72 22 87/60 69 98 % -- -- -- --    11/14/24 0013 -- 89 18 82/51 -- 94 % -- -- -- --          Weight (last 2 days)       Date/Time Weight    11/16/24 0554 89.5 (197.31)    11/15/24 0600 83.9 (185)    11/14/24 0900 84.3 (185.85)           CIWA-Ar Score       Row Name 11/16/24 04:40:07 11/16/24 03:30:37 11/16/24 0000       CIWA-Ar    Nausea and Vomiting 2 0 0    Tactile Disturbances 1 0 0    Tremor 3 2 1    Auditory Disturbances 2 2 0    Paroxysmal Sweats 2 2 1    Visual Disturbances 5 2 0    Anxiety 3 2 1    Headache, Fullness in Head 1 2 1    Agitation 3 1 0    Orientation and Clouding of Sensorium 1 0 0    CIWA-Ar Total 23 13 4      Row Name 11/15/24 2300 11/15/24 2224 11/15/24 22:23:12       CIWA-Ar    Nausea and Vomiting 0 0 0    Tactile Disturbances 0 0 0    Tremor 3 3 3    Auditory Disturbances 0 0 0    Paroxysmal Sweats 3 3 3    Visual Disturbances 0 0 0    Anxiety 3 3 3    Headache, Fullness in Head 2 2 2    Agitation 1 1 1    Orientation and Clouding of Sensorium 0 0 0    CIWA-Ar Total 12 12 12      Row Name 11/15/24 20:04:08 11/15/24 1830 11/15/24 1700       CIWA-Ar    BP -- -- 111/77    Pulse -- -- 87    Nausea and Vomiting 0 2 0    Tactile Disturbances 0 0 0    Tremor 1 2 2    Auditory Disturbances 0 0 0    Paroxysmal Sweats 2 3 0    Visual Disturbances 0 0 0    Anxiety 2 1 1    Headache, Fullness in Head 1 2 0    Agitation 0 3 1    Orientation and Clouding of Sensorium 0 0 0    CIWA-Ar Total 6 13 4      Row Name 11/15/24 1245 11/15/24 0845 11/15/24 0745       CIWA-Ar    BP -- 142/77 --    Nausea and Vomiting 0 0 0    Tactile Disturbances 0 0 1    Tremor 2 2 4    Auditory Disturbances 0 0 0    Paroxysmal Sweats 0 0 0    Visual Disturbances 0 0 1    Anxiety 1 1 4    Headache, Fullness in Head 0 0 1    Agitation 1 1 1    Orientation and Clouding of Sensorium 0 0 0    CIWA-Ar Total 4 4 12      Row Name 11/15/24 03:40:51 11/15/24 0245  11/14/24 22:07:26       CIWA-Ar    BP -- 124/70 --    Pulse -- 118 --    Nausea and Vomiting 0 0 0    Tactile Disturbances 0 0 0    Tremor 4 0 4    Auditory Disturbances 0 0 0    Paroxysmal Sweats 0 0 1    Visual Disturbances 0 0 0    Anxiety 0 0 0    Headache, Fullness in Head 0 0 0    Agitation 0 0 0    Orientation and Clouding of Sensorium 0 0  sleeping 0    CIWA-Ar Total 4 0 5      Row Name 11/14/24 1800 11/14/24 1330 11/14/24 1200       CIWA-Ar    Nausea and Vomiting 0 1 1    Tactile Disturbances 0 0 0    Tremor 1 1 1    Auditory Disturbances 0 0 0    Paroxysmal Sweats 0 0 0    Visual Disturbances 0 0 0    Anxiety 0  Patient sleeping 1 4    Headache, Fullness in Head 0  Patient sleeping.  No complaints of headache. 4 4    Agitation 2 1 1    Orientation and Clouding of Sensorium 0 0 0    CIWA-Ar Total 3 8 11      Row Name 11/14/24 0900             CIWA-Ar    Nausea and Vomiting 1      Tactile Disturbances 0      Tremor 1      Auditory Disturbances 0      Paroxysmal Sweats 0      Visual Disturbances 0      Anxiety 4      Headache, Fullness in Head 4      Agitation 0      Orientation and Clouding of Sensorium 0      CIWA-Ar Total 10                    Pertinent Labs/Diagnostic Results:   Radiology:    Cardiology:    GI:  No orders to display           Results from last 7 days   Lab Units 11/16/24  0340 11/15/24  0623 11/14/24  0806 11/14/24  0032   WBC Thousand/uL 6.35 6.14 6.30 7.00   HEMOGLOBIN g/dL 10.3* 10.4* 10.6* 12.6   HEMATOCRIT % 31.2* 30.5* 31.6* 36.6   PLATELETS Thousands/uL 73* 60* 74* 96*   TOTAL NEUT ABS Thousands/µL 4.72 4.15 4.13 5.46         Results from last 7 days   Lab Units 11/16/24  0340 11/15/24  0623 11/15/24  0007 11/14/24  1937 11/14/24  1800 11/14/24  0806 11/14/24  0419 11/14/24  0226   SODIUM mmol/L 127* 131*  --   --  127* 134*  --  130*   POTASSIUM mmol/L 4.4 3.9  --   --  4.4 3.6  --  3.5   CHLORIDE mmol/L 94* 98  --   --  94* 98  --  96   CO2 mmol/L 26 26  --   --  22 21  --  17*    ANION GAP mmol/L 7 7  --   --  11 15*  --  17*   BUN mg/dL 30* 22  --   --  25 32*  --  35*   CREATININE mg/dL 1.10 0.91  --   --  1.08 1.31*  --  1.44*   EGFR ml/min/1.73sq m 71 90  --   --  73 57  --  51   CALCIUM mg/dL 8.3* 8.0*  --   --  7.4* 7.2*  --  6.4*   CALCIUM, IONIZED mmol/L  --   --  1.08* 1.03*  --   --  0.85*  --    MAGNESIUM mg/dL 1.1* 1.8*  --   --  1.6* 2.0  --  1.1*     Results from last 7 days   Lab Units 11/16/24  1132 11/16/24  0340 11/15/24  0623 11/14/24  0806 11/14/24  0226 11/14/24  0032   AST U/L  --  228* 86* 57* 63* 82*   ALT U/L  --  102* 44 33 35 46   ALK PHOS U/L  --  119* 112* 97 102 133*   TOTAL PROTEIN g/dL  --  5.9* 5.7* 5.8* 5.5* 6.9   ALBUMIN g/dL  --  3.2* 3.1* 3.1* 3.0* 3.7   TOTAL BILIRUBIN mg/dL  --  0.80 0.89 0.59 0.54 0.74   AMMONIA umol/L 36  --   --   --   --   --      Results from last 7 days   Lab Units 11/16/24  1140 11/16/24  0720 11/15/24  2042 11/15/24  1649 11/15/24  1120 11/15/24  0719 11/14/24  2228 11/14/24  1544 11/14/24  1410 11/14/24  1214 11/14/24  1007 11/14/24  0914   POC GLUCOSE mg/dl 118 138 185* 225* 217* 135 123 170* 150* 139 89 82     Results from last 7 days   Lab Units 11/16/24  0340 11/15/24  0623 11/14/24  1800 11/14/24  0806 11/14/24 0226 11/14/24 0032   GLUCOSE RANDOM mg/dL 124 127 350* 73 197* 71         Results from last 7 days   Lab Units 11/14/24 0032   HEMOGLOBIN A1C % 5.2   EAG mg/dl 103     BETA-HYDROXYBUTYRATE   Date Value Ref Range Status   01/22/2024 3.2 (H) <0.6 mmol/L Final          Results from last 7 days   Lab Units 11/14/24 0438   PH HALIE  7.291*   PCO2 HALIE mm Hg 38.3*   PO2 HALIE mm Hg 56.0*   HCO3 HALIE mmol/L 18.0*   BASE EXC HALIE mmol/L -7.9   O2 CONTENT HALIE ml/dL 13.5   O2 HGB, VENOUS % 79.8       Results from last 7 days   Lab Units 11/14/24  0419 11/14/24 0226 11/14/24 0032   HS TNI 0HR ng/L  --   --  38   HS TNI 2HR ng/L  --  31  --    HSTNI D2 ng/L  --  -7  --    HS TNI 4HR ng/L 33  --   --    HSTNI D4 ng/L -5  --    --          Results from last 7 days   Lab Units 11/14/24  0053   PROTIME seconds 15.1*   INR  1.13     Results from last 7 days   Lab Units 11/14/24  0806   TSH 3RD GENERATON uIU/mL 0.510         Results from last 7 days   Lab Units 11/14/24  0438   LACTIC ACID mmol/L 0.8             Results from last 7 days   Lab Units 11/15/24  0007   BNP pg/mL 1,150*     Results from last 7 days   Lab Units 11/16/24  1107 11/14/24  1154   CLARITY UA   --  Clear   COLOR UA   --  Yellow   SPEC GRAV UA   --  1.020   PH UA   --  5.5   GLUCOSE UA mg/dl  --  Negative   KETONES UA mg/dl  --  10 (1+)*   BLOOD UA   --  Negative   PROTEIN UA mg/dl  --  Trace*   NITRITE UA   --  Negative   BILIRUBIN UA   --  Negative   UROBILINOGEN UA (BE) mg/dl  --  <2.0   LEUKOCYTES UA   --  Negative   WBC UA /hpf  --  0-1*   RBC UA /hpf  --  None Seen   BACTERIA UA /hpf  --  None Seen   EPITHELIAL CELLS WET PREP /hpf  --  Occasional   SODIUM UR  35  --    CREATININE UR mg/dL 55.4  --      Results from last 7 days   Lab Units 11/14/24  0032   ETHANOL LVL mg/dL 381*       Network Utilization Review Department  ATTENTION: Please call with any questions or concerns to [phone] and carefully listen to the prompts so that you are directed to the right person. All voicemails are confidential.   For Discharge needs, contact Care Management DC Support Team at [phone] opt. 2  Send all requests for admission clinical reviews, approved or denied determinations and any other requests to dedicated fax number below belonging to the [place] where the patient is receiving treatment. List of dedicated fax numbers for the Facilities:  FACILITY NAME UR FAX NUMBER   ADMISSION DENIALS (Administrative/Medical Necessity) [phone]   DISCHARGE SUPPORT TEAM (NETWORK) [phone]   PARENT CHILD HEALTH (Maternity/NICU/Pediatrics) [phone]   [facility] [phone]   [facility] [phone]   [facility]  [facility] [phone]   [facility] [phone]   [facility] [phone]   [facility] [phone]   [facility] [phone]   [facility] [phone]   [facility] [phone]   [facility] [phone]   [facility] [phone]   [facility] [phone]

## 2024-11-16 NOTE — HOSPITAL COURSE
Gregg Partida is a 64-year-old male with PMH of A.fib w/ RVR, alcohol abuse, COPD, T2DM w/ CKD, CHF, BPH who presented to the hospital on 11/14/2024 after being found with acute alcohol intoxication and A.fib w/ RVR and was admitted to ICU for management of afib, electrolyte abnormalities and CIWA monitoring. Patient was transferred to [facility], but was returned to ICU due acute encephalopathy secondary to ETOH withdrawal and increased ativan requirement. Patient was placed on telemetry, attempted rate control with Cardizem drip, and cardiology was consulted. Patient declined offer for cardioversion by cardiology. Improved mentation was achieved after several doses of phenobarb and patient was deemed appropriate for transfer to [facility]. Despite max dose cardizem drip patient remained in persistent a-fib and was transitioned to digoxin loading and subsequently achieved rate control with daily digoxin and increased dose of metoprolol. Patient has remained afebrile and hemodynamically stable for discharge. Pt was discharged with increased metoprolol of 200 mg BID and new digoxin 125mcg daily. Pt was also discharged with new amlodipine 5mg daily for HTN. Pt was also discharged with salt tablet 2g BID Pt was advised with alcohol cessation and follow up with PCP and cardiology.  Patient now tolerating diet and vitally stable and ready for discharge.

## 2024-11-16 NOTE — ASSESSMENT & PLAN NOTE
Lab Results   Component Value Date    EGFR 71 11/16/2024    EGFR 90 11/15/2024    EGFR 73 11/14/2024    CREATININE 1.10 11/16/2024    CREATININE 0.91 11/15/2024    CREATININE 1.08 11/14/2024     ENMA resolved  Close intake and output  Daily weights  Trend serum creatinine

## 2024-11-16 NOTE — ASSESSMENT & PLAN NOTE
Sodium 127 today from 131  ? Secondary to volume resuscitation   Given 20mg IV lasix yesterday   Close I&O  Send urine studies   Trend BMP

## 2024-11-16 NOTE — ASSESSMENT & PLAN NOTE
Chronic afib on metoprolol and eliquis  Afib RVR overnight likely exacerbated in the setting of alcohol withdrawal   Initiated on cardizem gtt by primary team, plan to D/C when able   Continue metoprolol 50mg BID with hold parameters  Continue eliquis

## 2024-11-16 NOTE — ASSESSMENT & PLAN NOTE
Wt Readings from Last 3 Encounters:   11/16/24 89.5 kg (197 lb 5 oz)   09/19/24 86.2 kg (190 lb 1.6 oz)   09/16/24 83.3 kg (183 lb 10.3 oz)     Daily weights  Continue metoprolol, eliquis   Given 20mg IV lasix yesterday  Close I&Os  Cardiac diet when able to tolerate PO

## 2024-11-16 NOTE — ASSESSMENT & PLAN NOTE
Lab Results   Component Value Date    HGBA1C 5.2 11/14/2024       Recent Labs     11/15/24  0719 11/15/24  1120 11/15/24  1649 11/15/24  2042   POCGLU 135 217* 225* 185*       Blood Sugar Average: Last 72 hrs:  (P) [id number]  Continue SSI ACHS  Goal BG <180

## 2024-11-16 NOTE — PLAN OF CARE
Problem: PAIN - ADULT  Goal: Verbalizes/displays adequate comfort level or baseline comfort level  Description: Interventions:  - Encourage patient to monitor pain and request assistance  - Assess pain using appropriate pain scale  - Administer analgesics based on type and severity of pain and evaluate response  - Implement non-pharmacological measures as appropriate and evaluate response  - Consider cultural and social influences on pain and pain management  - Notify physician/advanced practitioner if interventions unsuccessful or patient reports new pain  11/15/2024 2015 by Esme Barajas RN  Outcome: Progressing  11/15/2024 2015 by Esme Barajas RN  Outcome: Progressing     Problem: INFECTION - ADULT  Goal: Absence or prevention of progression during hospitalization  Description: INTERVENTIONS:  - Assess and monitor for signs and symptoms of infection  - Monitor lab/diagnostic results  - Monitor all insertion sites, i.e. indwelling lines, tubes, and drains  - Monitor endotracheal if appropriate and nasal secretions for changes in amount and color  - [place] appropriate cooling/warming therapies per order  - Administer medications as ordered  - Instruct and encourage patient and family to use good hand hygiene technique  - Identify and instruct in appropriate isolation precautions for identified infection/condition  Outcome: Progressing  Goal: Absence of fever/infection during neutropenic period  Description: INTERVENTIONS:  - Monitor WBC    Outcome: Progressing     Problem: SAFETY ADULT  Goal: Patient will remain free of falls  Description: INTERVENTIONS:  - Educate patient/family on patient safety including physical limitations  - Instruct patient to call for assistance with activity   - Consult OT/PT to assist with strengthening/mobility   - Keep Call bell within reach  - Keep bed low and locked with side rails adjusted as appropriate  - Keep care items and personal belongings within reach  - Initiate  and maintain comfort rounds  - Make Fall Risk Sign visible to staff  - Offer Toileting every 2 Hours, in advance of need  - Initiate/Maintain bed and chair alarm  - Obtain necessary fall risk management equipment: bed and chair alarm/yellow socks and bracelet   - Apply yellow socks and bracelet for high fall risk patients  - Consider moving patient to room near nurses station  Outcome: Progressing  Goal: Maintain or return to baseline ADL function  Description: INTERVENTIONS:  -  Assess patient's ability to carry out ADLs; assess patient's baseline for ADL function and identify physical deficits which impact ability to perform ADLs (bathing, care of mouth/teeth, toileting, grooming, dressing, etc.)  - Assess/evaluate cause of self-care deficits   - Assess range of motion  - Assess patient's mobility; develop plan if impaired  - Assess patient's need for assistive devices and provide as appropriate  - Encourage maximum independence but intervene and supervise when necessary  - Involve family in performance of ADLs  - Assess for home care needs following discharge   - Consider OT consult to assist with ADL evaluation and planning for discharge  - Provide patient education as appropriate  Outcome: Progressing  Goal: Maintains/Returns to pre admission functional level  Description: INTERVENTIONS:  - Perform AM-PAC 6 Click Basic Mobility/ Daily Activity assessment daily.  - Set and communicate daily mobility goal to care team and patient/family/caregiver.   - Collaborate with rehabilitation services on mobility goals if consulted  - Perform Range of Motion 3 times a day.  - Reposition patient every 2 hours.  - Dangle patient 3 times a day  - Stand patient 3 times a day  - Ambulate patient 3 times a day  - Out of bed to chair 3 times a day   - Out of bed for meals 3 times a day  - Out of bed for toileting  - Record patient progress and toleration of activity level   Outcome: Progressing     Problem: DISCHARGE  PLANNING  Goal: Discharge to home or other facility with appropriate resources  Description: INTERVENTIONS:  - Identify barriers to discharge w/patient and caregiver  - Arrange for needed discharge resources and transportation as appropriate  - Identify discharge learning needs (meds, wound care, etc.)  - Arrange for interpretive services to assist at discharge as needed  - Refer to Case Management Department for coordinating discharge planning if the patient needs post-hospital services based on physician/advanced practitioner order or complex needs related to functional status, cognitive ability, or social support system  Outcome: Progressing     Problem: Knowledge Deficit  Goal: Patient/family/caregiver demonstrates understanding of disease process, treatment plan, medications, and discharge instructions  Description: Complete learning assessment and assess knowledge base.  Interventions:  - Provide teaching at level of understanding  - Provide teaching via preferred learning methods  Outcome: Progressing     Problem: NEUROSENSORY - ADULT  Goal: Achieves stable or improved neurological status  Description: INTERVENTIONS  - Monitor and report changes in neurological status  - Monitor vital signs such as temperature, blood pressure, glucose, and any other labs ordered   - Initiate measures to prevent increased intracranial pressure  - Monitor for seizure activity and implement precautions if appropriate      Outcome: Progressing  Goal: Remains free of injury related to seizures activity  Description: INTERVENTIONS  - Maintain airway, patient safety  and administer oxygen as ordered  - Monitor patient for seizure activity, document and report duration and description of seizure to physician/advanced practitioner  - If seizure occurs,  ensure patient safety during seizure  - Reorient patient post seizure  - Seizure pads on all 4 side rails  - Instruct patient/family to notify RN of any seizure activity including if  an aura is experienced  - Instruct patient/family to call for assistance with activity based on nursing assessment  - Administer anti-seizure medications if ordered    Outcome: Progressing  Goal: Achieves maximal functionality and self care  Description: INTERVENTIONS  - Monitor swallowing and airway patency with patient fatigue and changes in neurological status  - Encourage and assist patient to increase activity and self care.   - Encourage visually impaired, hearing impaired and aphasic patients to use assistive/communication devices  Outcome: Progressing     Problem: CARDIOVASCULAR - ADULT  Goal: Maintains optimal cardiac output and hemodynamic stability  Description: INTERVENTIONS:  - Monitor I/O, vital signs and rhythm  - Monitor for S/S and trends of decreased cardiac output  - Administer and titrate ordered vasoactive medications to optimize hemodynamic stability  - Assess quality of pulses, skin color and temperature  - Assess for signs of decreased coronary artery perfusion  - Instruct patient to report change in severity of symptoms  Outcome: Progressing     Problem: RESPIRATORY - ADULT  Goal: Achieves optimal ventilation and oxygenation  Description: INTERVENTIONS:  - Assess for changes in respiratory status  - Assess for changes in mentation and behavior  - Position to facilitate oxygenation and minimize respiratory effort  - Oxygen administered by appropriate delivery if ordered  - Initiate smoking cessation education as indicated  - Encourage broncho-pulmonary hygiene including cough, deep breathe, Incentive Spirometry  - Assess the need for suctioning and aspirate as needed  - Assess and instruct to report SOB or any respiratory difficulty  - Respiratory Therapy support as indicated  Outcome: Progressing     Problem: GASTROINTESTINAL - ADULT  Goal: Minimal or absence of nausea and/or vomiting  Description: INTERVENTIONS:  - Administer IV fluids if ordered to ensure adequate hydration  - Maintain NPO  status until nausea and vomiting are resolved  - Nasogastric tube if ordered  - Administer ordered antiemetic medications as needed  - Provide nonpharmacologic comfort measures as appropriate  - Advance diet as tolerated, if ordered  - Consider nutrition services referral to assist patient with adequate nutrition and appropriate food choices  Outcome: Progressing  Goal: Maintains adequate nutritional intake  Description: INTERVENTIONS:  - Monitor percentage of each meal consumed  - Identify factors contributing to decreased intake, treat as appropriate  - Assist with meals as needed  - Monitor I&O, weight, and lab values if indicated  - Obtain nutrition services referral as needed  Outcome: Progressing     Problem: GENITOURINARY - ADULT  Goal: Maintains or returns to baseline urinary function  Description: INTERVENTIONS:  - Assess urinary function  - Encourage oral fluids to ensure adequate hydration if ordered  - Administer IV fluids as ordered to ensure adequate hydration  - Administer ordered medications as needed  - Offer frequent toileting  - Follow urinary retention protocol if ordered  Outcome: Progressing     Problem: METABOLIC, FLUID AND ELECTROLYTES - ADULT  Goal: Electrolytes maintained within normal limits  Description: INTERVENTIONS:  - Monitor labs and assess patient for signs and symptoms of electrolyte imbalances  - Administer electrolyte replacement as ordered  - Monitor response to electrolyte replacements, including repeat lab results as appropriate  - Instruct patient on fluid and nutrition as appropriate  Outcome: Progressing  Goal: Fluid balance maintained  Description: INTERVENTIONS:  - Monitor labs   - Monitor I/O and WT  - Instruct patient on fluid and nutrition as appropriate  - Assess for signs & symptoms of volume excess or deficit  Outcome: Progressing  Goal: Glucose maintained within target range  Description: INTERVENTIONS:  - Monitor Blood Glucose as ordered  - Assess for signs and  symptoms of hyperglycemia and hypoglycemia  - Administer ordered medications to maintain glucose within target range  - Assess nutritional intake and initiate nutrition service referral as needed  Outcome: Progressing     Problem: SKIN/TISSUE INTEGRITY - ADULT  Goal: Skin Integrity remains intact(Skin Breakdown Prevention)  Description: Assess:  -Perform Herrera assessment every shift   -Clean and moisturize skin every daily/order   -Inspect skin when repositioning, toileting, and assisting with ADLS  -Assess extremities for adequate circulation and sensation     Bed Management:  -Have minimal linens on bed & keep smooth, unwrinkled  -Change linens as needed when moist or perspiring  -Avoid sitting or lying in one position for more than 2  hours while in bed  -Keep HOB at 45 degrees     Toileting:  -Offer bedside commode  -Assess for incontinence every hour   -Use incontinent care products after each incontinent episode such as foaming cleanser     Activity:  -Mobilize patient 3  times a day  -Encourage activity and walks on unit  -Encourage or provide ROM exercises   -Turn and reposition patient every 2 Hours  -Use appropriate equipment to lift or move patient in bed  -Instruct/ Assist with weight shifting every hour when out of bed in chair  -Consider limitation of chair time 2 hour intervals    Skin Care:  -Avoid use of baby powder, tape, friction and shearing, hot water or constrictive clothing  -Relieve pressure over bony prominences using wedges/turning system   -Do not massage red bony areas    Next Steps:  -Teach patient strategies to minimize risks such as hygiene/mobility    -Consider consults to  interdisciplinary teams such as wound care   Outcome: Progressing  Goal: Incision(s), wounds(s) or drain site(s) healing without S/S of infection  Description: INTERVENTIONS  - Assess and document dressing, incision, wound bed, drain sites and surrounding tissue  - Provide patient and family education  - Perform  skin care/dressing changes every per order/PRN   Outcome: Progressing     Problem: HEMATOLOGIC - ADULT  Goal: Maintains hematologic stability  Description: INTERVENTIONS  - Assess for signs and symptoms of bleeding or hemorrhage  - Monitor labs  - Administer supportive blood products/factors as ordered and appropriate  Outcome: Progressing     Problem: MUSCULOSKELETAL - ADULT  Goal: Maintain or return mobility to safest level of function  Description: INTERVENTIONS:  - Assess patient's ability to carry out ADLs; assess patient's baseline for ADL function and identify physical deficits which impact ability to perform ADLs (bathing, care of mouth/teeth, toileting, grooming, dressing, etc.)  - Assess/evaluate cause of self-care deficits   - Assess range of motion  - Assess patient's mobility  - Assess patient's need for assistive devices and provide as appropriate  - Encourage maximum independence but intervene and supervise when necessary  - Involve family in performance of ADLs  - Assess for home care needs following discharge   - Consider OT consult to assist with ADL evaluation and planning for discharge  - Provide patient education as appropriate  Outcome: Progressing     Problem: Nutrition/Hydration-ADULT  Goal: Nutrient/Hydration intake appropriate for improving, restoring or maintaining nutritional needs  Description: Monitor and assess patient's nutrition/hydration status for malnutrition. Collaborate with interdisciplinary team and initiate plan and interventions as ordered.  Monitor patient's weight and dietary intake as ordered or per policy. Utilize nutrition screening tool and intervene as necessary. Determine patient's food preferences and provide high-protein, high-caloric foods as appropriate.     INTERVENTIONS:  - Monitor oral intake, urinary output, labs, and treatment plans  - Assess nutrition and hydration status and recommend course of action  - Evaluate amount of meals eaten  - Assist patient  with eating if necessary   - Allow adequate time for meals  - Recommend/ encourage appropriate diets, oral nutritional supplements, and vitamin/mineral supplements  - Order, calculate, and assess calorie counts as needed  - Recommend, monitor, and adjust tube feedings and TPN/PPN based on assessed needs  - Assess need for intravenous fluids  - Provide specific nutrition/hydration education as appropriate  - Include patient/family/caregiver in decisions related to nutrition  Outcome: Progressing     Problem: Prexisting or High Potential for Compromised Skin Integrity  Goal: Skin integrity is maintained or improved  Description: INTERVENTIONS:  - Identify patients at risk for skin breakdown  - Assess and monitor skin integrity  - Assess and monitor nutrition and hydration status  - Monitor labs   - Assess for incontinence   - Turn and reposition patient  - Assist with mobility/ambulation  - Relieve pressure over bony prominences  - Avoid friction and shearing  - Provide appropriate hygiene as needed including keeping skin clean and dry  - Evaluate need for skin moisturizer/barrier cream  - Collaborate with interdisciplinary team   - Patient/family teaching  - Consider wound care consult   Outcome: Progressing

## 2024-11-16 NOTE — NURSING NOTE
Patient transferred to ICU room 11 with all patient belongings. Bedside report given to Margie ICU RN.

## 2024-11-16 NOTE — ASSESSMENT & PLAN NOTE
Increasing transaminitis today, unclear etiology. ? Related to alcoholic hepatitis  Consider RUQ US  Trend LFTs  Ammonia level pending

## 2024-11-16 NOTE — WOUND OSTOMY CARE
Progress Note - Wound   Gregg Partida 64 y.o. male MRN: [id number]  Unit/Bed#: [address] [place] [address] Encounter: [id number]        Assessment:   This is a 64 year old male patient admitted on 11/14/24 with AFIB/RVR. Patient was extremely lethargic and barely responsive to verbal or tactile stimuli. He was turned and repositioned with assist of 2 persons. Patient has episodes of urinary incontinence with no record of BM.    Findings:  1-Full thickness abrasions to bilateral arm and right knee with dry, brown eschar and small serosanguinous drainage. Orders in place for wound care.  2-Sacrobuttocks and heels intact - orders in place for skin care and for prevention.    Wound Care Plan:  1-Cleanse wounds to right lateral arm with NSS & pat dry. Apply betadine to wounds, cover with ABD, rolled gauze and tape. Change every other day & prn soilage/dislodgement.  2-Cleanse wound to left lateral arm and right knee with NSS & pat dry then apply small foam dressing. Change every other day and prn soilage/dislodgement.  3-Apply Prevent barrier cream to bilateral sacrum, buttock and heels BID and PRN  4-Heel lift boots to be worn to bilateral heels at all times in bed and after transfer to reclining chair if able. If patient unable to tolerate, must float heels on 2 pillows to offload pressure so heels are not in contact with mattress or pillows.  5-Ehob pressure redistribution cushion in chair when out of bed if able. Avoid prolonged sitting.  6-Moisturize skin daily with skin nourishing cream.  7-Turn/reposition q2h or when medically stable for pressure re-distribution on skin.     Wound 09/14/24 Skin Tear Abrasion(s) Arm Left;Lower;Lateral (Active)   Wound Image   11/15/24 1115   Wound Description Dry;Brown;Eschar 11/15/24 1115   Babita-wound Assessment Intact 11/15/24 1115   Wound Length (cm) 1.9 cm 11/15/24 1115   Wound Width (cm) 1.5 cm 11/15/24 1115   Wound Depth (cm) 0 cm 11/15/24 1115   Wound Surface Area (cm^2) 2.85 cm^2  11/15/24 1115   Wound Volume (cm^3) 0 cm^3 11/15/24 1115   Calculated Wound Volume (cm^3) 0 cm^3 11/15/24 1115   Drainage Amount None 11/15/24 1115   Treatments Cleansed 11/15/24 1115   Dressing Foam;Betadome 11/15/24 1115   Dressing Changed New 11/15/24 1115   Dressing Status Clean;Dry;Intact 11/15/24 1115       Wound 11/14/24 Arm Right;Lateral;Lower (Active)   Wound Image   11/15/24 1107   Wound Description Dry;Brown;Eschar 11/15/24 1107   Babita-wound Assessment Intact 11/15/24 1107   Wound Length (cm) 28 cm 11/15/24 1107   Wound Width (cm) 13 cm 11/15/24 1107   Wound Depth (cm) 0.1 cm 11/15/24 1107   Wound Surface Area (cm^2) 364 cm^2 11/15/24 1107   Wound Volume (cm^3) 36.4 cm^3 11/15/24 1107   Calculated Wound Volume (cm^3) 36.4 cm^3 11/15/24 1107   Drainage Amount Small 11/15/24 1107   Drainage Description Serosanguineous 11/15/24 1107   Non-staged Wound Description Full thickness 11/15/24 1107   Treatments Cleansed 11/15/24 1107   Dressing ABD;Dry dressing;Betadine 11/15/24 1107   Dressing Changed New 11/15/24 1107   Dressing Status Clean;Dry;Intact 11/15/24 1107       Wound 11/14/24 Knee Anterior;Right (Active)   Wound Image   11/15/24 1111   Wound Description Dry;Black;Eschar 11/15/24 1111   Babita-wound Assessment Intact 11/15/24 1111   Wound Length (cm) 6.8 cm 11/15/24 1111   Wound Width (cm) 5.3 cm 11/15/24 1111   Wound Depth (cm) 0 cm 11/15/24 1111   Wound Surface Area (cm^2) 36.04 cm^2 11/15/24 1111   Wound Volume (cm^3) 0 cm^3 11/15/24 1111   Calculated Wound Volume (cm^3) 0 cm^3 11/15/24 1111   Drainage Amount None 11/15/24 1111   Non-staged Wound Description Full thickness 11/15/24 1111   Treatments Cleansed 11/15/24 1111   Dressing Foam;Betadine 11/15/24 1111   Dressing Changed New 11/15/24 1111   Dressing Status Clean;Dry;Intact 11/15/24 1111     Discussed assessment findings, and plan of care/recommendations with Jen SORIANO.    Wound care will follow along with patient throughout admission, please call  or text with questions and concerns.    Recommendations written as orders.  Iraida Galvan MSN, RN, CWON

## 2024-11-16 NOTE — QUICK NOTE
"Overnight, pt's HR remained < 100 per telemetry review, cardizem drip titrated down to 5.     Notified by nursing at approximately 0445 that the patient was agitated, hallucinating, trying to get out of bed, and not directable. Patient received 6 mg PO ativan since 1800 on 11/5. CIWA 23 per nursing. HR increased to 160s with aflutter noted on telemetry, nursing increased cardizem drip.    On examination, patient AO to person, place then responds \"he is on a bike\", reporting to see his brother in the room. Patient motioned that he is \"attempting to smoke a cigarette\" with patient reporting \"he needs an chele tray\". Reporting symptoms feel different than prior withdrawal. Ordered non violent soft restraints for patient safety. Patient denies CP or SOB, endorses generalized pain.     Mag 1.1, ordered 4gram IV mag for repletion. New transaminitis noted on CMP, BUN increased to 30, bicarb WNL. Remained afebrile overnight, WBC WNL.     Discussed with ICU in setting of acute mental status change and increased ativan requirement who agreed for transfer.   "

## 2024-11-16 NOTE — PROGRESS NOTES
Progress Note - Critical Care/ICU   Name: Gregg Partida 64 y.o. male I MRN: [id number]  Unit/Bed#: ICU 11 I Date of Admission: 11/14/2024   Date of Service: 11/16/2024 I Hospital Day: 2      Assessment & Plan  Alcohol withdrawal (HCC)  Admitted on 11/14 and given phenobarbital load (260mg, 130mg, 130mg) with improved symptoms. Downgraded to the floor  Given multiple doses of Ativan over the last 24 hours per [facility] protocol. Given escalating doses of ativan this morning and therefore transitioned back to SD1 for close monitoring  Give 130mg of phenobarbitol now for alcohol withdrawal  Precedex gtt if needed  Continue thiamine/folic acid   LFTs slowly rising this AM, consider RUQ US  Encourage cessation  Encephalopathy acute  More encephalopathic overnight, likely secondary to alcohol withdrawal  Patient confused, hallucinating. Non-focal  Given multiple doses of ativan per CIWA protocol  Transferred to ICU for repeat phenobarb dosing  Precedex gtt as needed   Ammonia pending   Close neuro checks  CAM ICU  Atrial fibrillation with RVR (HCC)  Chronic afib on metoprolol and eliquis  Afib RVR overnight likely exacerbated in the setting of alcohol withdrawal   Initiated on cardizem gtt by primary team, plan to D/C when able   Continue metoprolol 50mg BID with hold parameters  Continue eliquis   High anion gap metabolic acidosis (Resolved: 11/16/2024)  resolved  Thrombocytopenia (HCC)  Appears chronic likely in the setting of alcohol use   Follow daily  Electrolyte abnormality  Aggressively replete electrolytes  Goal K>4, Mg>2  COPD (chronic obstructive pulmonary disease) (HCC)  Continue current inhaler/nebulizer regimen  Wean oxygen for goal SpO2>88%  Chronic heart failure with preserved ejection fraction (HCC)  Wt Readings from Last 3 Encounters:   11/16/24 89.5 kg (197 lb 5 oz)   09/19/24 86.2 kg (190 lb 1.6 oz)   09/16/24 83.3 kg (183 lb 10.3 oz)     Daily weights  Continue metoprolol, eliquis   Given 20mg IV lasix  yesterday  Close I&Os  Cardiac diet when able to tolerate PO         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 disease (HCC)  Lab Results   Component Value Date    HGBA1C 5.2 11/14/2024       Recent Labs     11/15/24  0719 11/15/24  1120 11/15/24  1649 11/15/24  2042   POCGLU 135 217* 225* 185*       Blood Sugar Average: Last 72 hrs:  (P) 145.0130643882402038  Continue SSI ACHS  Goal BG <180  Acute kidney injury superimposed on chronic kidney disease  (HCC) (Resolved: 11/16/2024)  Lab Results   Component Value Date    EGFR 71 11/16/2024    EGFR 90 11/15/2024    EGFR 73 11/14/2024    CREATININE 1.10 11/16/2024    CREATININE 0.91 11/15/2024    CREATININE 1.08 11/14/2024     ENMA resolved  Close intake and output  Daily weights  Trend serum creatinine  Ambulatory dysfunction  PT/OT when appropriate  BPH (benign prostatic hyperplasia)  Continue home Flomax  Urinary retention protocol  Transaminitis  Increasing transaminitis today, unclear etiology. ? Related to alcoholic hepatitis  Consider RUQ US  Trend LFTs  Ammonia level pending  Closed fracture of one rib of left side  Pain control as needed  Hyponatremia  Sodium 127 today from 131  ? Secondary to volume resuscitation   Given 20mg IV lasix yesterday   Close I&O  Send urine studies   Trend BMP  Disposition: Stepdown Level 1    ICU Core Measures     A: Assess, Prevent, and Manage Pain Has pain been assessed? Yes  Need for changes to pain regimen? No   B: Both SAT/SAT  N/A   C: Choice of Sedation RASS Goal: 0 Alert and Calm  Need for changes to sedation or analgesia regimen? NA   D: Delirium CAM-ICU: Positive   E: Early Mobility  Plan for early mobility? Yes   F: Family Engagement Plan for family engagement today? Yes         Prophylaxis:  VTE VTE covered by:  apixaban, Oral, 5 mg at 11/15/24 1700       Stress Ulcer  covered bypantoprazole (PROTONIX) 40 mg tablet [[id number]] (Long-Term Med), pantoprazole (PROTONIX) EC tablet 40 mg [[id number]]         24 Hour  Events :   More agitated and encephalopathic overnight requiring multiple ativan doses. This AM, he had an acute episode of agitation with hallucinations and returned to Afib with RVR prompting critical care consultation. Initiated on cardizem infusion by primary team. Once in the ICU, he was given 130mg of phenobarbital and started on precedex infusion with improvement in HR and mental status.     Subjective   Review of Systems: Review of Systems    Objective :                   Vitals I/O      Most Recent Min/Max in 24hrs   Temp 97.9 °F (36.6 °C) Temp  Min: 97.9 °F (36.6 °C)  Max: 98.5 °F (36.9 °C)   Pulse 77 Pulse  Min: 74  Max: 163   Resp 14 Resp  Min: 14  Max: 36   /85 BP  Min: 110/66  Max: 189/123   O2 Sat 99 % SpO2  Min: 83 %  Max: 99 %      Intake/Output Summary (Last 24 hours) at 11/16/2024 0712  Last data filed at 11/16/2024 0630  Gross per 24 hour   Intake 204.42 ml   Output 200 ml   Net 4.42 ml       Diet Vlad/CHO Controlled; Consistent Carbohydrate Diet Level 2 (5 carb servings/75 grams CHO/meal)    Invasive Monitoring           Physical Exam   Physical Exam  Eyes:      Pupils: Pupils are equal, round, and reactive to light.   Skin:     General: Skin is warm and dry.   HENT:      Head: Normocephalic and atraumatic.      Mouth/Throat:      Mouth: Mucous membranes are moist.   Cardiovascular:      Rate and Rhythm: Tachycardia present. Rhythm irregular.      Heart sounds: Normal heart sounds.   Musculoskeletal:      Right lower leg: No edema.      Left lower leg: No edema.   Abdominal: General: Bowel sounds are normal.      Palpations: Abdomen is soft.   Constitutional:       Appearance: He is ill-appearing.   Pulmonary:      Effort: Tachypnea present.   Neurological:      General: No focal deficit present.      Mental Status: He is alert.      Cranial Nerves: No facial asymmetry.      Comments: Confused, agitated, restless and attempting and Confused, agitated, restless. Tremulous, diaphoretic.  Non-focal.           Diagnostic Studies        Lab Results: I have reviewed the following results:     Medications:  Scheduled PRN   apixaban, 5 mg, BID  ferrous sulfate, 325 mg, Daily With Breakfast  fluticasone-vilanterol, 1 puff, Daily  folic acid, 1,000 mcg, Daily  gabapentin, 300 mg, TID  insulin lispro, 1-6 Units, 4x Daily (AC & HS)  magnesium sulfate, 4 g, Once  metoprolol tartrate, 50 mg, Q12H SEDA  nicotine, 1 patch, Daily  pantoprazole, 40 mg, BID AC  tamsulosin, 0.4 mg, Daily  vitamin B-1, 100 mg, Daily      acetaminophen, 650 mg, Q6H PRN  levalbuterol, 1.25 mg, Q8H PRN  senna-docusate sodium, 1 tablet, Daily PRN  trimethobenzamide, 200 mg, Q6H PRN       Continuous    dexmedetomidine, 0.1-0.7 mcg/kg/hr, Last Rate: Stopped (11/16/24 0630)         Labs:   CBC    Recent Labs     11/15/24  0623 11/16/24  0340   WBC 6.14 6.35   HGB 10.4* 10.3*   HCT 30.5* 31.2*   PLT 60* 73*     BMP    Recent Labs     11/15/24  0623 11/16/24  0340   SODIUM 131* 127*   K 3.9 4.4   CL 98 94*   CO2 26 26   AGAP 7 7   BUN 22 30*   CREATININE 0.91 1.10   CALCIUM 8.0* 8.3*       Coags    No recent results     Additional Electrolytes  Recent Labs     11/14/24  1937 11/15/24  0007 11/15/24  0623 11/16/24  0340   MG  --   --  1.8* 1.1*   CAIONIZED 1.03* 1.08*  --   --           Blood Gas    No recent results  No recent results LFTs  Recent Labs     11/15/24  0623 11/16/24  0340   ALT 44 102*   AST 86* 228*   ALKPHOS 112* 119*   ALB 3.1* 3.2*   TBILI 0.89 0.80       Infectious  No recent results  Glucose  Recent Labs     11/14/24  0806 11/14/24  1800 11/15/24  0623 11/16/24  0340   GLUC 73 350* 127 124

## 2024-11-16 NOTE — ASSESSMENT & PLAN NOTE
More encephalopathic overnight, likely secondary to alcohol withdrawal  Patient confused, hallucinating. Non-focal  Given multiple doses of ativan per CIWA protocol  Transferred to ICU for repeat phenobarb dosing  Precedex gtt as needed   Ammonia pending   Close neuro checks  CAM ICU

## 2024-11-17 ENCOUNTER — APPOINTMENT (INPATIENT)
Dept: RADIOLOGY | Facility: HOSPITAL | Age: 64
DRG: 308 | End: 2024-11-17
Payer: COMMERCIAL

## 2024-11-17 PROBLEM — R06.89 ACUTE RESPIRATORY INSUFFICIENCY: Status: ACTIVE | Noted: 2024-11-17

## 2024-11-17 LAB
ALBUMIN SERPL BCG-MCNC: 3.2 G/DL (ref 3.5–5)
ALP SERPL-CCNC: 186 U/L (ref 34–104)
ALT SERPL W P-5'-P-CCNC: 78 U/L (ref 7–52)
ANION GAP SERPL CALCULATED.3IONS-SCNC: 10 MMOL/L (ref 4–13)
ANION GAP SERPL CALCULATED.3IONS-SCNC: 9 MMOL/L (ref 4–13)
AST SERPL W P-5'-P-CCNC: 94 U/L (ref 13–39)
BASOPHILS # BLD AUTO: 0.02 THOUSANDS/ÂΜL (ref 0–0.1)
BASOPHILS NFR BLD AUTO: 0 % (ref 0–1)
BILIRUB SERPL-MCNC: 0.89 MG/DL (ref 0.2–1)
BUN SERPL-MCNC: 23 MG/DL (ref 5–25)
BUN SERPL-MCNC: 24 MG/DL (ref 5–25)
CALCIUM ALBUM COR SERPL-MCNC: 8.9 MG/DL (ref 8.3–10.1)
CALCIUM SERPL-MCNC: 8.2 MG/DL (ref 8.4–10.2)
CALCIUM SERPL-MCNC: 8.3 MG/DL (ref 8.4–10.2)
CHLORIDE SERPL-SCNC: 100 MMOL/L (ref 96–108)
CHLORIDE SERPL-SCNC: 96 MMOL/L (ref 96–108)
CO2 SERPL-SCNC: 24 MMOL/L (ref 21–32)
CO2 SERPL-SCNC: 25 MMOL/L (ref 21–32)
CREAT SERPL-MCNC: 0.84 MG/DL (ref 0.6–1.3)
CREAT SERPL-MCNC: 0.91 MG/DL (ref 0.6–1.3)
EOSINOPHIL # BLD AUTO: 0.05 THOUSAND/ÂΜL (ref 0–0.61)
EOSINOPHIL NFR BLD AUTO: 1 % (ref 0–6)
ERYTHROCYTE [DISTWIDTH] IN BLOOD BY AUTOMATED COUNT: 15.9 % (ref 11.6–15.1)
GFR SERPL CREATININE-BSD FRML MDRD: 90 ML/MIN/1.73SQ M
GFR SERPL CREATININE-BSD FRML MDRD: 93 ML/MIN/1.73SQ M
GLUCOSE SERPL-MCNC: 105 MG/DL (ref 65–140)
GLUCOSE SERPL-MCNC: 107 MG/DL (ref 65–140)
GLUCOSE SERPL-MCNC: 110 MG/DL (ref 65–140)
GLUCOSE SERPL-MCNC: 112 MG/DL (ref 65–140)
GLUCOSE SERPL-MCNC: 98 MG/DL (ref 65–140)
GLUCOSE SERPL-MCNC: 99 MG/DL (ref 65–140)
HCT VFR BLD AUTO: 33.4 % (ref 36.5–49.3)
HGB BLD-MCNC: 11.2 G/DL (ref 12–17)
IMM GRANULOCYTES # BLD AUTO: 0.07 THOUSAND/UL (ref 0–0.2)
IMM GRANULOCYTES NFR BLD AUTO: 1 % (ref 0–2)
LYMPHOCYTES # BLD AUTO: 0.88 THOUSANDS/ÂΜL (ref 0.6–4.47)
LYMPHOCYTES NFR BLD AUTO: 10 % (ref 14–44)
MAGNESIUM SERPL-MCNC: 1.7 MG/DL (ref 1.9–2.7)
MCH RBC QN AUTO: 32.8 PG (ref 26.8–34.3)
MCHC RBC AUTO-ENTMCNC: 33.5 G/DL (ref 31.4–37.4)
MCV RBC AUTO: 98 FL (ref 82–98)
MONOCYTES # BLD AUTO: 1.05 THOUSAND/ÂΜL (ref 0.17–1.22)
MONOCYTES NFR BLD AUTO: 12 % (ref 4–12)
NEUTROPHILS # BLD AUTO: 6.6 THOUSANDS/ÂΜL (ref 1.85–7.62)
NEUTS SEG NFR BLD AUTO: 76 % (ref 43–75)
NRBC BLD AUTO-RTO: 0 /100 WBCS
OSMOLALITY UR/SERPL-RTO: 288 MMOL/KG (ref 282–298)
OSMOLALITY UR: 652 MMOL/KG (ref 250–900)
PLATELET # BLD AUTO: 89 THOUSANDS/UL (ref 149–390)
PMV BLD AUTO: 10.9 FL (ref 8.9–12.7)
POTASSIUM SERPL-SCNC: 4.5 MMOL/L (ref 3.5–5.3)
POTASSIUM SERPL-SCNC: 4.6 MMOL/L (ref 3.5–5.3)
PROCALCITONIN SERPL-MCNC: 0.1 NG/ML
PROT SERPL-MCNC: 6 G/DL (ref 6.4–8.4)
RBC # BLD AUTO: 3.41 MILLION/UL (ref 3.88–5.62)
SODIUM 24H UR-SCNC: 76 MOL/L
SODIUM SERPL-SCNC: 129 MMOL/L (ref 135–147)
SODIUM SERPL-SCNC: 135 MMOL/L (ref 135–147)
WBC # BLD AUTO: 8.67 THOUSAND/UL (ref 4.31–10.16)

## 2024-11-17 PROCEDURE — 94668 MNPJ CHEST WALL SBSQ: CPT

## 2024-11-17 PROCEDURE — 82948 REAGENT STRIP/BLOOD GLUCOSE: CPT

## 2024-11-17 PROCEDURE — 80053 COMPREHEN METABOLIC PANEL: CPT | Performed by: NURSE PRACTITIONER

## 2024-11-17 PROCEDURE — 94760 N-INVAS EAR/PLS OXIMETRY 1: CPT

## 2024-11-17 PROCEDURE — 84145 PROCALCITONIN (PCT): CPT | Performed by: PHYSICIAN ASSISTANT

## 2024-11-17 PROCEDURE — 94664 DEMO&/EVAL PT USE INHALER: CPT

## 2024-11-17 PROCEDURE — 99291 CRITICAL CARE FIRST HOUR: CPT | Performed by: ANESTHESIOLOGY

## 2024-11-17 PROCEDURE — 94669 MECHANICAL CHEST WALL OSCILL: CPT

## 2024-11-17 PROCEDURE — 85025 COMPLETE CBC W/AUTO DIFF WBC: CPT | Performed by: NURSE PRACTITIONER

## 2024-11-17 PROCEDURE — 80048 BASIC METABOLIC PNL TOTAL CA: CPT | Performed by: PHYSICIAN ASSISTANT

## 2024-11-17 PROCEDURE — 84300 ASSAY OF URINE SODIUM: CPT | Performed by: PHYSICIAN ASSISTANT

## 2024-11-17 PROCEDURE — 83930 ASSAY OF BLOOD OSMOLALITY: CPT | Performed by: PHYSICIAN ASSISTANT

## 2024-11-17 PROCEDURE — 83935 ASSAY OF URINE OSMOLALITY: CPT | Performed by: PHYSICIAN ASSISTANT

## 2024-11-17 PROCEDURE — 83735 ASSAY OF MAGNESIUM: CPT | Performed by: NURSE PRACTITIONER

## 2024-11-17 PROCEDURE — 71045 X-RAY EXAM CHEST 1 VIEW: CPT

## 2024-11-17 RX ORDER — METOPROLOL TARTRATE 50 MG
50 TABLET ORAL EVERY 12 HOURS SCHEDULED
Status: DISCONTINUED | OUTPATIENT
Start: 2024-11-17 | End: 2024-11-17

## 2024-11-17 RX ORDER — METOPROLOL TARTRATE 1 MG/ML
5 INJECTION, SOLUTION INTRAVENOUS EVERY 6 HOURS SCHEDULED
Status: DISCONTINUED | OUTPATIENT
Start: 2024-11-17 | End: 2024-11-17

## 2024-11-17 RX ORDER — METOPROLOL TARTRATE 1 MG/ML
5 INJECTION, SOLUTION INTRAVENOUS EVERY 6 HOURS PRN
Status: DISCONTINUED | OUTPATIENT
Start: 2024-11-17 | End: 2024-11-17

## 2024-11-17 RX ORDER — METOPROLOL TARTRATE 1 MG/ML
5 INJECTION, SOLUTION INTRAVENOUS ONCE
Status: COMPLETED | OUTPATIENT
Start: 2024-11-17 | End: 2024-11-17

## 2024-11-17 RX ORDER — ENOXAPARIN SODIUM 100 MG/ML
1 INJECTION SUBCUTANEOUS EVERY 12 HOURS SCHEDULED
Status: DISCONTINUED | OUTPATIENT
Start: 2024-11-17 | End: 2024-11-19

## 2024-11-17 RX ORDER — METOPROLOL TARTRATE 1 MG/ML
10 INJECTION, SOLUTION INTRAVENOUS
Status: DISCONTINUED | OUTPATIENT
Start: 2024-11-17 | End: 2024-11-17

## 2024-11-17 RX ORDER — MAGNESIUM SULFATE HEPTAHYDRATE 40 MG/ML
2 INJECTION, SOLUTION INTRAVENOUS ONCE
Status: COMPLETED | OUTPATIENT
Start: 2024-11-17 | End: 2024-11-17

## 2024-11-17 RX ORDER — PHENOBARBITAL SODIUM 65 MG/ML
65 INJECTION, SOLUTION INTRAMUSCULAR; INTRAVENOUS ONCE
Status: COMPLETED | OUTPATIENT
Start: 2024-11-17 | End: 2024-11-17

## 2024-11-17 RX ADMIN — METOPROLOL TARTRATE 5 MG: 5 INJECTION INTRAVENOUS at 00:11

## 2024-11-17 RX ADMIN — METOPROLOL TARTRATE 5 MG: 1 INJECTION, SOLUTION INTRAVENOUS at 03:54

## 2024-11-17 RX ADMIN — METOPROLOL TARTRATE 5 MG: 5 INJECTION INTRAVENOUS at 05:14

## 2024-11-17 RX ADMIN — PHENOBARBITAL SODIUM 65 MG: 65 INJECTION INTRAMUSCULAR; INTRAVENOUS at 03:26

## 2024-11-17 RX ADMIN — METOPROLOL TARTRATE 5 MG: 1 INJECTION, SOLUTION INTRAVENOUS at 07:44

## 2024-11-17 RX ADMIN — ENOXAPARIN SODIUM 90 MG: 100 INJECTION SUBCUTANEOUS at 21:21

## 2024-11-17 RX ADMIN — ENOXAPARIN SODIUM 90 MG: 100 INJECTION SUBCUTANEOUS at 09:21

## 2024-11-17 RX ADMIN — DEXMEDETOMIDINE HYDROCHLORIDE 1.2 MCG/KG/HR: 4 INJECTION, SOLUTION INTRAVENOUS at 05:29

## 2024-11-17 RX ADMIN — METOPROLOL TARTRATE 10 MG: 5 INJECTION INTRAVENOUS at 15:31

## 2024-11-17 RX ADMIN — SODIUM CHLORIDE 25 ML/HR: 4 INJECTION, SOLUTION, CONCENTRATE INTRAVENOUS at 09:11

## 2024-11-17 RX ADMIN — METOPROLOL TARTRATE 10 MG: 5 INJECTION INTRAVENOUS at 11:09

## 2024-11-17 RX ADMIN — DILTIAZEM HYDROCHLORIDE 5 MG/HR: 5 INJECTION, SOLUTION INTRAVENOUS at 16:06

## 2024-11-17 RX ADMIN — NICOTINE 1 PATCH: 14 PATCH, EXTENDED RELEASE TRANSDERMAL at 08:11

## 2024-11-17 RX ADMIN — DEXMEDETOMIDINE HYDROCHLORIDE 0.6 MCG/KG/HR: 4 INJECTION, SOLUTION INTRAVENOUS at 12:59

## 2024-11-17 RX ADMIN — DEXMEDETOMIDINE HYDROCHLORIDE 0.8 MCG/KG/HR: 4 INJECTION, SOLUTION INTRAVENOUS at 19:23

## 2024-11-17 RX ADMIN — DEXMEDETOMIDINE HYDROCHLORIDE 0.4 MCG/KG/HR: 4 INJECTION, SOLUTION INTRAVENOUS at 00:17

## 2024-11-17 RX ADMIN — MAGNESIUM SULFATE HEPTAHYDRATE 2 G: 40 INJECTION, SOLUTION INTRAVENOUS at 07:43

## 2024-11-17 RX ADMIN — FOLIC ACID: 5 INJECTION, SOLUTION INTRAMUSCULAR; INTRAVENOUS; SUBCUTANEOUS at 11:05

## 2024-11-17 RX ADMIN — FLUTICASONE FUROATE AND VILANTEROL TRIFENATATE 1 PUFF: 200; 25 POWDER RESPIRATORY (INHALATION) at 11:15

## 2024-11-17 NOTE — ASSESSMENT & PLAN NOTE
Sodium 127 yesterday, initiated on NSS at 100. Sodium improved to 132.   NSS d/c'ed overnight  Close I&O  Send urine studies   Trend BMP

## 2024-11-17 NOTE — ASSESSMENT & PLAN NOTE
Lab Results   Component Value Date    HGBA1C 5.2 11/14/2024       Recent Labs     11/16/24  0720 11/16/24  1140 11/16/24  1601 11/16/24  2159   POCGLU 138 118 109 111       Blood Sugar Average: Last 72 hrs:  (P) 138.7463796171242025  Continue SSI ACHS  Goal BG <180

## 2024-11-17 NOTE — ASSESSMENT & PLAN NOTE
Admitted on 11/14 with signs/symptoms of alcohol withdrawal. Given phenobarbital load (260mg, 130mg, 130mg) with improved symptoms. Downgraded to the floor  11/15-16: Given multiple doses of Ativan over the last 24 hours per CIWA protocol. Given escalating doses of ativan this morning and therefore transitioned back to SD1 for close monitoring  11/16: Give 130mg of phenobarbitol now for alcohol withdrawal. Precedex gtt at 0.04  11/16-17: required precedex gtt for agitation, increased overnight without improvement. Given additional 65mg of phenobarb at 0330  Continue thiamine/folic acid   Encourage cessation    Patient's MELD Score is: 7    MELD Score 90-day mortality   <=9 1.9%   10-19 6.0%   20-29 19.6%   30-39 52.6%   >=40 71.3%     MELD Score Component Values:  Component Value Date   Creatinine: 0.82 mg/dL 11/16/2024   Dialysis at least twice   in the past week  Or CVVHD for >=24 hours   in the past week:     No    Bilirubin, total: 0.65 mg/dL 11/16/2024   INR: 1.07 11/16/2024   Sodium: 132 mmol/L 11/16/2024

## 2024-11-17 NOTE — ASSESSMENT & PLAN NOTE
Currently on 3L NC with slightly increased WOB  CXR today  Consideration for diuretics given large volume resuscitation earlier this stay   Continue nebs  Hold steroids given no wheezing on exam

## 2024-11-17 NOTE — ASSESSMENT & PLAN NOTE
Increasing transaminitis 11/16, unclear etiology. ? Related to alcoholic hepatitis  RUQ US pending  LFTs now downtrending again, no abdominal pain on exam

## 2024-11-17 NOTE — ASSESSMENT & PLAN NOTE
11/15-16: More encephalopathic overnight, likely secondary to alcohol withdrawal. Patient confused, hallucinating. Non-focal. Given multiple doses of ativan per [facility] protocol. Transferred to ICU for repeat phenobarb dosing  Received 130mg of phenobarb, started on precedex  Continue precedex for now, goal to wean to off   Ammonia 36  Consideration for baseline confusion/agitation and not just related to alcohol use given previous hospital notes   Mental status waxing/waning but overall less agitated  Close neuro checks  CAM ICU

## 2024-11-17 NOTE — ASSESSMENT & PLAN NOTE
Assessment/Plan:      Postpartum visit with poor appetite and pelvic achiness  Declines birth control  No mood disturbance  Check a TSH  Increased oral hydration and Ensure  Return in 3 months  Subjective:     Patient ID: Tammy Byers is a 34 y o  female  Patient is a 53-year-old female para 2 who is 6 weeks postpartum here for a postpartum visit with her grandmother  Patient has good family support  Patient has a nondescript pelvic discomfort right = left  Patient states the baby is doing well and thriving  She is breast-feeding and supplementing with bottle-feeding  Patient feels that he she is not eating enough  Review of systems is negative for fevers, heavy vaginal bleeding  Patient declines birth control as she is not sexually active  Patient states that she does not like hormonal contraceptive because it makes her granados  Patient denies feeling overwhelmed or depressed  Review of Systems   Constitutional: Positive for appetite change  Negative for fever  Gastrointestinal: Negative for abdominal pain, nausea and vomiting  Genitourinary: Positive for pelvic pain  Negative for vaginal bleeding, vaginal discharge and vaginal pain  Musculoskeletal: Positive for arthralgias  Psychiatric/Behavioral: Negative for behavioral problems and dysphoric mood  Objective:     Physical Exam   Constitutional: She appears well-developed  HENT:   Head: Normocephalic  Pulmonary/Chest: Effort normal    Psychiatric: She has a normal mood and affect   Her behavior is normal  Thought content normal  Wt Readings from Last 3 Encounters:   11/16/24 89.5 kg (197 lb 5 oz)   09/19/24 86.2 kg (190 lb 1.6 oz)   09/16/24 83.3 kg (183 lb 10.3 oz)     Daily weights  PTA medsL metoprolol 50mg BID, eliquis, ranexa  Continue metoprolol (now 5mg IV Q6 given waxing and waning mental status), on therapeutic lovenox until tolerating PO   Close I&Os  Cardiac diet when able to tolerate PO

## 2024-11-17 NOTE — ASSESSMENT & PLAN NOTE
Chronic afib on metoprolol and eliquis  Afib RVR overnight likely exacerbated in the setting of alcohol withdrawal   Initiated on cardizem gtt by primary team on 11/15, now discontinued   Continue metoprolol 5mg IV Q6, transition back to oral when able   Given additional 5mg of IV lopressor overnight due to Afib with RVR without significant improvement   Continue therapeutic lovenox and transition back to eliquis when tolerating PO

## 2024-11-17 NOTE — PROGRESS NOTES
Progress Note - Critical Care/ICU   Name: Gregg Partida 64 y.o. male I MRN: [id number]  Unit/Bed#: ICU 11 I Date of Admission: 11/14/2024   Date of Service: 11/17/2024 I Hospital Day: 3      Assessment & Plan  Alcohol withdrawal (HCC)  Admitted on 11/14 with signs/symptoms of alcohol withdrawal. Given phenobarbital load (260mg, 130mg, 130mg) with improved symptoms. Downgraded to the floor  11/15-16: Given multiple doses of Ativan over the last 24 hours per CIWA protocol. Given escalating doses of ativan this morning and therefore transitioned back to SD1 for close monitoring  11/16: Give 130mg of phenobarbitol now for alcohol withdrawal. Precedex gtt at 0.04  11/16-17: required precedex gtt for agitation, increased overnight without improvement. Given additional 65mg of phenobarb at 0330  Continue thiamine/folic acid   Encourage cessation    Patient's MELD Score is: 7    MELD Score 90-day mortality   <=9 1.9%   10-19 6.0%   20-29 19.6%   30-39 52.6%   >=40 71.3%     MELD Score Component Values:  Component Value Date   Creatinine: 0.82 mg/dL 11/16/2024   Dialysis at least twice   in the past week  Or CVVHD for >=24 hours   in the past week:     No    Bilirubin, total: 0.65 mg/dL 11/16/2024   INR: 1.07 11/16/2024   Sodium: 132 mmol/L 11/16/2024      Encephalopathy acute  11/15-16: More encephalopathic overnight, likely secondary to alcohol withdrawal. Patient confused, hallucinating. Non-focal. Given multiple doses of ativan per CIWA protocol. Transferred to ICU for repeat phenobarb dosing  Received 130mg of phenobarb, started on precedex  Continue precedex for now, goal to wean to off   Ammonia 36  Consideration for baseline confusion/agitation and not just related to alcohol use given previous hospital notes   Mental status waxing/waning but overall less agitated  Close neuro checks  [facility] ICU  Atrial fibrillation with RVR (HCC)  Chronic afib on metoprolol and eliquis  Afib RVR overnight likely exacerbated in the  setting of alcohol withdrawal   Initiated on cardizem gtt by primary team on 11/15, now discontinued   Continue metoprolol 5mg IV Q6, transition back to oral when able   Given additional 5mg of IV lopressor overnight due to Afib with RVR without significant improvement   Continue therapeutic lovenox and transition back to eliquis when tolerating PO   Thrombocytopenia (HCC)  Appears chronic likely in the setting of alcohol use   Follow daily  Electrolyte abnormality  Aggressively replete electrolytes  Goal K>4, Mg>2  Acute respiratory insufficiency  Currently on 3L NC with slightly increased WOB  CXR today  Consideration for diuretics given large volume resuscitation earlier this stay   Continue nebs  Hold steroids given no wheezing on exam   COPD (chronic obstructive pulmonary disease) (HCC)  Continue current inhaler/nebulizer regimen  Wean oxygen for goal SpO2>88%  Chronic heart failure with preserved ejection fraction (HCC)  Wt Readings from Last 3 Encounters:   11/16/24 89.5 kg (197 lb 5 oz)   09/19/24 86.2 kg (190 lb 1.6 oz)   09/16/24 83.3 kg (183 lb 10.3 oz)     Daily weights  PTA medsL metoprolol 50mg BID, eliquis, ranexa  Continue metoprolol (now 5mg IV Q6 given waxing and waning mental status), on therapeutic lovenox until tolerating PO   Close I&Os  Cardiac diet when able to tolerate PO         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 disease (HCC)  Lab Results   Component Value Date    HGBA1C 5.2 11/14/2024       Recent Labs     11/16/24  0720 11/16/24  1140 11/16/24  1601 11/16/24  2159   POCGLU 138 118 109 111       Blood Sugar Average: Last 72 hrs:  (P) 138.8873633553910370  Continue SSI ACHS  Goal BG <180  Ambulatory dysfunction  PT/OT when appropriate  BPH (benign prostatic hyperplasia)  Continue home Flomax  Urinary retention protocol  Transaminitis  Increasing transaminitis 11/16, unclear etiology. ? Related to alcoholic hepatitis  RUQ US pending  LFTs now downtrending again, no  abdominal pain on exam  Closed fracture of one rib of left side  Pain control as needed  Hyponatremia  Sodium 127 yesterday, initiated on NSS at 100. Sodium improved to 132.   NSS d/c'ed overnight  Close I&O  Send urine studies   Trend BMP  Disposition: Stepdown Level 1    ICU Core Measures     A: Assess, Prevent, and Manage Pain Has pain been assessed? Yes  Need for changes to pain regimen? No   B: Both SAT/SAT  N/A   C: Choice of Sedation RASS Goal: 0 Alert and Calm or N/A patient not on sedation  Need for changes to sedation or analgesia regimen? No   D: Delirium CAM-ICU: Positive   E: Early Mobility  Plan for early mobility? Yes   F: Family Engagement Plan for family engagement today? Yes         Prophylaxis:  VTE VTE covered by:  enoxaparin, Subcutaneous, 90 mg at 11/16/24 2105       Stress Ulcer  covered bypantoprazole (PROTONIX) 40 mg tablet [[id number]] (Long-Term Med), pantoprazole (PROTONIX) EC tablet 40 mg [[id number]]         24 Hour Events : Brought down to the ICU yesterday AM and given 130mg of phenobarbital with improved agitation. Required intermittent precedex since for agitation/restlessness. HR waxing and waning based on agitation levels but mostly <120. NSS discontinued. Given additional 65mg of phenobarb this morning for worsening agitation, ? Hallucination and increasing tachycardia. Agitation improved but HR remained in the 140-150s. Given an additional 5mg of IV lopressor.      Subjective   Review of Systems: Review of Systems   Unable to perform ROS: Mental status change       Objective :                   Vitals I/O      Most Recent Min/Max in 24hrs   Temp 97.6 °F (36.4 °C) Temp  Min: 96.9 °F (36.1 °C)  Max: 97.6 °F (36.4 °C)   Pulse (!) 123 Pulse  Min: 76  Max: 160   Resp 22 Resp  Min: 11  Max: 36   /92 BP  Min: 117/85  Max: 189/123   O2 Sat 98 % SpO2  Min: 83 %  Max: 99 %      Intake/Output Summary (Last 24 hours) at 11/17/2024 0026  Last data filed at 11/16/2024 2034  Gross per 24  hour   Intake 354.42 ml   Output 1100 ml   Net -745.58 ml       Diet Vlad/CHO Controlled; Consistent Carbohydrate Diet Level 2 (5 carb servings/75 grams CHO/meal)    Invasive Monitoring           Physical Exam   Physical Exam  Eyes:      Extraocular Movements: Extraocular movements intact.      Pupils: Pupils are equal, round, and reactive to light.   Skin:     General: Skin is warm and dry.   HENT:      Head: Normocephalic and atraumatic.   Cardiovascular:      Rate and Rhythm: Tachycardia present. Rhythm irregular.      Heart sounds: Normal heart sounds.   Musculoskeletal:      Right lower leg: No edema.      Left lower leg: No edema.   Abdominal: General: Bowel sounds are normal.      Palpations: Abdomen is soft.   Constitutional:       General: He is not in acute distress.     Appearance: He is ill-appearing.   Pulmonary:      Effort: Tachypnea, accessory muscle usage and accessory muscle usage present.      Breath sounds: Transmitted upper airway sounds present. Rhonchi present. No wheezing.      Comments: Wet cough   Neurological:      Motor: Strength full and intact in all extremities.      Comments: Confused. Non-focal. Moving all extremities.           Diagnostic Studies        Lab Results: I have reviewed the following results:     Medications:  Scheduled PRN   enoxaparin, 1 mg/kg, Q12H SEDA  ferrous sulfate, 325 mg, Daily With Breakfast  fluticasone-vilanterol, 1 puff, Daily  folic acid, 1,000 mcg, Daily  gabapentin, 300 mg, TID  insulin lispro, 1-6 Units, 4x Daily (AC & HS)  metoprolol, 5 mg, Q6H  nicotine, 1 patch, Daily  pantoprazole, 40 mg, BID AC  tamsulosin, 0.4 mg, Daily  vitamin B-1, 100 mg, Daily      acetaminophen, 650 mg, Q6H PRN  levalbuterol, 1.25 mg, Q8H PRN  senna-docusate sodium, 1 tablet, Daily PRN  trimethobenzamide, 200 mg, Q6H PRN       Continuous    dexmedetomidine, 0.1-0.7 mcg/kg/hr, Last Rate: 0.4 mcg/kg/hr (11/17/24 0017)  sodium chloride, 75 mL/hr, Last Rate: 75 mL/hr (11/16/24  2105)         Labs:   CBC    Recent Labs     11/15/24  0623 11/16/24  0340   WBC 6.14 6.35   HGB 10.4* 10.3*   HCT 30.5* 31.2*   PLT 60* 73*     BMP    Recent Labs     11/16/24  0340 11/16/24  1719   SODIUM 127* 132*   K 4.4 4.1   CL 94* 97   CO2 26 29   AGAP 7 6   BUN 30* 23   CREATININE 1.10 0.82   CALCIUM 8.3* 8.3*       Coags    Recent Labs     11/16/24  1719   INR 1.07        Additional Electrolytes  Recent Labs     11/16/24  0340 11/16/24  1719   MG 1.1* 1.6*          Blood Gas    No recent results  No recent results LFTs  Recent Labs     11/16/24  0340 11/16/24  1719   * 81*   * 102*   ALKPHOS 119* 111*   ALB 3.2* 3.1*   TBILI 0.80 0.65       Infectious  No recent results  Glucose  Recent Labs     11/15/24  0623 11/16/24  0340 11/16/24  1719   GLUC 127 124 99

## 2024-11-18 ENCOUNTER — APPOINTMENT (INPATIENT)
Dept: RADIOLOGY | Facility: HOSPITAL | Age: 64
DRG: 308 | End: 2024-11-18
Payer: COMMERCIAL

## 2024-11-18 LAB
ALBUMIN SERPL BCG-MCNC: 3.1 G/DL (ref 3.5–5)
ALP SERPL-CCNC: 157 U/L (ref 34–104)
ALT SERPL W P-5'-P-CCNC: 71 U/L (ref 7–52)
ANION GAP SERPL CALCULATED.3IONS-SCNC: 11 MMOL/L (ref 4–13)
ANION GAP SERPL CALCULATED.3IONS-SCNC: 12 MMOL/L (ref 4–13)
ANION GAP SERPL CALCULATED.3IONS-SCNC: 13 MMOL/L (ref 4–13)
AST SERPL W P-5'-P-CCNC: 63 U/L (ref 13–39)
BASOPHILS # BLD AUTO: 0.04 THOUSANDS/ÂΜL (ref 0–0.1)
BASOPHILS NFR BLD AUTO: 0 % (ref 0–1)
BILIRUB SERPL-MCNC: 0.87 MG/DL (ref 0.2–1)
BUN SERPL-MCNC: 22 MG/DL (ref 5–25)
BUN SERPL-MCNC: 25 MG/DL (ref 5–25)
BUN SERPL-MCNC: 26 MG/DL (ref 5–25)
CALCIUM ALBUM COR SERPL-MCNC: 8.9 MG/DL (ref 8.3–10.1)
CALCIUM SERPL-MCNC: 7.8 MG/DL (ref 8.4–10.2)
CALCIUM SERPL-MCNC: 8.2 MG/DL (ref 8.4–10.2)
CALCIUM SERPL-MCNC: 8.3 MG/DL (ref 8.4–10.2)
CHLORIDE SERPL-SCNC: 100 MMOL/L (ref 96–108)
CHLORIDE SERPL-SCNC: 100 MMOL/L (ref 96–108)
CHLORIDE SERPL-SCNC: 99 MMOL/L (ref 96–108)
CO2 SERPL-SCNC: 23 MMOL/L (ref 21–32)
CO2 SERPL-SCNC: 24 MMOL/L (ref 21–32)
CO2 SERPL-SCNC: 25 MMOL/L (ref 21–32)
CREAT SERPL-MCNC: 0.84 MG/DL (ref 0.6–1.3)
CREAT SERPL-MCNC: 0.91 MG/DL (ref 0.6–1.3)
CREAT SERPL-MCNC: 0.96 MG/DL (ref 0.6–1.3)
EOSINOPHIL # BLD AUTO: 0.03 THOUSAND/ÂΜL (ref 0–0.61)
EOSINOPHIL NFR BLD AUTO: 0 % (ref 0–6)
ERYTHROCYTE [DISTWIDTH] IN BLOOD BY AUTOMATED COUNT: 16 % (ref 11.6–15.1)
GFR SERPL CREATININE-BSD FRML MDRD: 84 ML/MIN/1.73SQ M
GFR SERPL CREATININE-BSD FRML MDRD: 90 ML/MIN/1.73SQ M
GFR SERPL CREATININE-BSD FRML MDRD: 93 ML/MIN/1.73SQ M
GLUCOSE SERPL-MCNC: 100 MG/DL (ref 65–140)
GLUCOSE SERPL-MCNC: 105 MG/DL (ref 65–140)
GLUCOSE SERPL-MCNC: 118 MG/DL (ref 65–140)
GLUCOSE SERPL-MCNC: 119 MG/DL (ref 65–140)
GLUCOSE SERPL-MCNC: 121 MG/DL (ref 65–140)
GLUCOSE SERPL-MCNC: 129 MG/DL (ref 65–140)
HBV CORE AB SER QL: ABNORMAL
HBV CORE IGM SER QL: ABNORMAL
HBV SURFACE AG SER QL: ABNORMAL
HCT VFR BLD AUTO: 32.2 % (ref 36.5–49.3)
HCV AB SER QL: REACTIVE
HGB BLD-MCNC: 10.5 G/DL (ref 12–17)
IMM GRANULOCYTES # BLD AUTO: 0.07 THOUSAND/UL (ref 0–0.2)
IMM GRANULOCYTES NFR BLD AUTO: 1 % (ref 0–2)
INR PPP: 1.24 (ref 0.85–1.19)
LYMPHOCYTES # BLD AUTO: 0.85 THOUSANDS/ÂΜL (ref 0.6–4.47)
LYMPHOCYTES NFR BLD AUTO: 9 % (ref 14–44)
MAGNESIUM SERPL-MCNC: 1.5 MG/DL (ref 1.9–2.7)
MAGNESIUM SERPL-MCNC: 2 MG/DL (ref 1.9–2.7)
MCH RBC QN AUTO: 32.6 PG (ref 26.8–34.3)
MCHC RBC AUTO-ENTMCNC: 32.6 G/DL (ref 31.4–37.4)
MCV RBC AUTO: 100 FL (ref 82–98)
MONOCYTES # BLD AUTO: 1.56 THOUSAND/ÂΜL (ref 0.17–1.22)
MONOCYTES NFR BLD AUTO: 17 % (ref 4–12)
NEUTROPHILS # BLD AUTO: 6.54 THOUSANDS/ÂΜL (ref 1.85–7.62)
NEUTS SEG NFR BLD AUTO: 73 % (ref 43–75)
NRBC BLD AUTO-RTO: 0 /100 WBCS
PLATELET # BLD AUTO: 117 THOUSANDS/UL (ref 149–390)
PMV BLD AUTO: 10.9 FL (ref 8.9–12.7)
POTASSIUM SERPL-SCNC: 3.9 MMOL/L (ref 3.5–5.3)
POTASSIUM SERPL-SCNC: 4.4 MMOL/L (ref 3.5–5.3)
POTASSIUM SERPL-SCNC: 4.4 MMOL/L (ref 3.5–5.3)
PROT SERPL-MCNC: 5.7 G/DL (ref 6.4–8.4)
PROTHROMBIN TIME: 16.2 SECONDS (ref 12.3–15)
RBC # BLD AUTO: 3.22 MILLION/UL (ref 3.88–5.62)
SODIUM SERPL-SCNC: 134 MMOL/L (ref 135–147)
SODIUM SERPL-SCNC: 136 MMOL/L (ref 135–147)
SODIUM SERPL-SCNC: 137 MMOL/L (ref 135–147)
WBC # BLD AUTO: 9.09 THOUSAND/UL (ref 4.31–10.16)

## 2024-11-18 PROCEDURE — 85610 PROTHROMBIN TIME: CPT | Performed by: NURSE PRACTITIONER

## 2024-11-18 PROCEDURE — 94760 N-INVAS EAR/PLS OXIMETRY 1: CPT

## 2024-11-18 PROCEDURE — 82948 REAGENT STRIP/BLOOD GLUCOSE: CPT

## 2024-11-18 PROCEDURE — 87340 HEPATITIS B SURFACE AG IA: CPT | Performed by: PHYSICIAN ASSISTANT

## 2024-11-18 PROCEDURE — 80048 BASIC METABOLIC PNL TOTAL CA: CPT

## 2024-11-18 PROCEDURE — 99232 SBSQ HOSP IP/OBS MODERATE 35: CPT | Performed by: INTERNAL MEDICINE

## 2024-11-18 PROCEDURE — 86705 HEP B CORE ANTIBODY IGM: CPT | Performed by: PHYSICIAN ASSISTANT

## 2024-11-18 PROCEDURE — 94669 MECHANICAL CHEST WALL OSCILL: CPT

## 2024-11-18 PROCEDURE — NC001 PR NO CHARGE: Performed by: STUDENT IN AN ORGANIZED HEALTH CARE EDUCATION/TRAINING PROGRAM

## 2024-11-18 PROCEDURE — 85025 COMPLETE CBC W/AUTO DIFF WBC: CPT | Performed by: NURSE PRACTITIONER

## 2024-11-18 PROCEDURE — 86803 HEPATITIS C AB TEST: CPT | Performed by: PHYSICIAN ASSISTANT

## 2024-11-18 PROCEDURE — 94640 AIRWAY INHALATION TREATMENT: CPT

## 2024-11-18 PROCEDURE — 92610 EVALUATE SWALLOWING FUNCTION: CPT

## 2024-11-18 PROCEDURE — 76705 ECHO EXAM OF ABDOMEN: CPT

## 2024-11-18 PROCEDURE — 83735 ASSAY OF MAGNESIUM: CPT | Performed by: NURSE PRACTITIONER

## 2024-11-18 PROCEDURE — 83735 ASSAY OF MAGNESIUM: CPT

## 2024-11-18 PROCEDURE — 94668 MNPJ CHEST WALL SBSQ: CPT

## 2024-11-18 PROCEDURE — 99233 SBSQ HOSP IP/OBS HIGH 50: CPT | Performed by: STUDENT IN AN ORGANIZED HEALTH CARE EDUCATION/TRAINING PROGRAM

## 2024-11-18 PROCEDURE — 80053 COMPREHEN METABOLIC PANEL: CPT | Performed by: NURSE PRACTITIONER

## 2024-11-18 PROCEDURE — 86704 HEP B CORE ANTIBODY TOTAL: CPT | Performed by: PHYSICIAN ASSISTANT

## 2024-11-18 RX ORDER — SODIUM CHLORIDE, SODIUM GLUCONATE, SODIUM ACETATE, POTASSIUM CHLORIDE, MAGNESIUM CHLORIDE, SODIUM PHOSPHATE, DIBASIC, AND POTASSIUM PHOSPHATE .53; .5; .37; .037; .03; .012; .00082 G/100ML; G/100ML; G/100ML; G/100ML; G/100ML; G/100ML; G/100ML
500 INJECTION, SOLUTION INTRAVENOUS ONCE
Status: COMPLETED | OUTPATIENT
Start: 2024-11-18 | End: 2024-11-18

## 2024-11-18 RX ORDER — MAGNESIUM SULFATE HEPTAHYDRATE 40 MG/ML
4 INJECTION, SOLUTION INTRAVENOUS ONCE
Status: COMPLETED | OUTPATIENT
Start: 2024-11-18 | End: 2024-11-18

## 2024-11-18 RX ORDER — LEVALBUTEROL INHALATION SOLUTION 1.25 MG/3ML
1.25 SOLUTION RESPIRATORY (INHALATION)
Status: DISCONTINUED | OUTPATIENT
Start: 2024-11-18 | End: 2024-11-24

## 2024-11-18 RX ORDER — METOPROLOL TARTRATE 1 MG/ML
5 INJECTION, SOLUTION INTRAVENOUS ONCE
Status: COMPLETED | OUTPATIENT
Start: 2024-11-18 | End: 2024-11-18

## 2024-11-18 RX ORDER — METOPROLOL TARTRATE 1 MG/ML
5 INJECTION, SOLUTION INTRAVENOUS EVERY 6 HOURS
Status: DISCONTINUED | OUTPATIENT
Start: 2024-11-18 | End: 2024-11-19

## 2024-11-18 RX ORDER — SODIUM CHLORIDE FOR INHALATION 3 %
4 VIAL, NEBULIZER (ML) INHALATION
Status: DISCONTINUED | OUTPATIENT
Start: 2024-11-18 | End: 2024-11-24

## 2024-11-18 RX ORDER — PHENOBARBITAL SODIUM 65 MG/ML
65 INJECTION, SOLUTION INTRAMUSCULAR; INTRAVENOUS ONCE
Status: COMPLETED | OUTPATIENT
Start: 2024-11-18 | End: 2024-11-18

## 2024-11-18 RX ADMIN — DILTIAZEM HYDROCHLORIDE 10 MG/HR: 5 INJECTION, SOLUTION INTRAVENOUS at 21:20

## 2024-11-18 RX ADMIN — Medication 4 ML: at 08:16

## 2024-11-18 RX ADMIN — ENOXAPARIN SODIUM 90 MG: 100 INJECTION SUBCUTANEOUS at 20:31

## 2024-11-18 RX ADMIN — NICOTINE 1 PATCH: 14 PATCH, EXTENDED RELEASE TRANSDERMAL at 09:37

## 2024-11-18 RX ADMIN — LEVALBUTEROL HYDROCHLORIDE 1.25 MG: 1.25 SOLUTION RESPIRATORY (INHALATION) at 14:03

## 2024-11-18 RX ADMIN — LEVALBUTEROL HYDROCHLORIDE 1.25 MG: 1.25 SOLUTION RESPIRATORY (INHALATION) at 20:33

## 2024-11-18 RX ADMIN — LEVALBUTEROL HYDROCHLORIDE 1.25 MG: 1.25 SOLUTION RESPIRATORY (INHALATION) at 08:16

## 2024-11-18 RX ADMIN — MAGNESIUM SULFATE HEPTAHYDRATE 4 G: 40 INJECTION, SOLUTION INTRAVENOUS at 09:34

## 2024-11-18 RX ADMIN — SODIUM CHLORIDE, SODIUM GLUCONATE, SODIUM ACETATE, POTASSIUM CHLORIDE, MAGNESIUM CHLORIDE, SODIUM PHOSPHATE, DIBASIC, AND POTASSIUM PHOSPHATE 500 ML: .53; .5; .37; .037; .03; .012; .00082 INJECTION, SOLUTION INTRAVENOUS at 18:31

## 2024-11-18 RX ADMIN — Medication 4 ML: at 20:34

## 2024-11-18 RX ADMIN — DEXMEDETOMIDINE HYDROCHLORIDE 1.2 MCG/KG/HR: 4 INJECTION, SOLUTION INTRAVENOUS at 05:20

## 2024-11-18 RX ADMIN — METOPROLOL TARTRATE 5 MG: 1 INJECTION, SOLUTION INTRAVENOUS at 16:45

## 2024-11-18 RX ADMIN — FOLIC ACID: 5 INJECTION, SOLUTION INTRAMUSCULAR; INTRAVENOUS; SUBCUTANEOUS at 14:51

## 2024-11-18 RX ADMIN — Medication 4 ML: at 14:03

## 2024-11-18 RX ADMIN — PHENOBARBITAL SODIUM 65 MG: 65 INJECTION INTRAMUSCULAR at 14:50

## 2024-11-18 RX ADMIN — SODIUM CHLORIDE, SODIUM GLUCONATE, SODIUM ACETATE, POTASSIUM CHLORIDE, MAGNESIUM CHLORIDE, SODIUM PHOSPHATE, DIBASIC, AND POTASSIUM PHOSPHATE 500 ML: .53; .5; .37; .037; .03; .012; .00082 INJECTION, SOLUTION INTRAVENOUS at 20:36

## 2024-11-18 RX ADMIN — DEXMEDETOMIDINE HYDROCHLORIDE 0.8 MCG/KG/HR: 4 INJECTION, SOLUTION INTRAVENOUS at 00:54

## 2024-11-18 RX ADMIN — METOPROLOL TARTRATE 5 MG: 1 INJECTION, SOLUTION INTRAVENOUS at 11:30

## 2024-11-18 RX ADMIN — PHENOBARBITAL SODIUM 65 MG: 65 INJECTION INTRAMUSCULAR at 12:59

## 2024-11-18 RX ADMIN — DEXMEDETOMIDINE HYDROCHLORIDE 0.2 MCG/KG/HR: 4 INJECTION, SOLUTION INTRAVENOUS at 14:24

## 2024-11-18 RX ADMIN — METOPROLOL TARTRATE 5 MG: 1 INJECTION, SOLUTION INTRAVENOUS at 20:39

## 2024-11-18 RX ADMIN — DEXMEDETOMIDINE HYDROCHLORIDE 0.8 MCG/KG/HR: 4 INJECTION, SOLUTION INTRAVENOUS at 21:34

## 2024-11-18 RX ADMIN — METOPROLOL TARTRATE 5 MG: 1 INJECTION, SOLUTION INTRAVENOUS at 22:05

## 2024-11-18 RX ADMIN — METOPROLOL TARTRATE 5 MG: 1 INJECTION, SOLUTION INTRAVENOUS at 17:43

## 2024-11-18 RX ADMIN — DILTIAZEM HYDROCHLORIDE 10 MG/HR: 5 INJECTION, SOLUTION INTRAVENOUS at 02:55

## 2024-11-18 RX ADMIN — ENOXAPARIN SODIUM 90 MG: 100 INJECTION SUBCUTANEOUS at 09:38

## 2024-11-18 NOTE — ASSESSMENT & PLAN NOTE
Admitted on 11/14 with signs/symptoms of alcohol withdrawal. Given phenobarbital load (260mg, 130mg, 130mg) with improved symptoms. Downgraded to the floor  11/15-16: Given multiple doses of Ativan over the last 24 hours per CIWA protocol. Given escalating doses of ativan this morning and therefore transitioned back to SD1 for close monitoring  11/16: Give 130mg of phenobarbitol now for alcohol withdrawal. Precedex gtt at 0.04  11/16-17: required precedex gtt for agitation, increased overnight without improvement. Given additional 65mg of phenobarb at 0330  Continue thiamine/folic acid   Encourage cessation    Patient's MELD Score is: 7    MELD Score 90-day mortality   <=9 1.9%   10-19 6.0%   20-29 19.6%   30-39 52.6%   >=40 71.3%     MELD Score Component Values:  Component Value Date   Creatinine: 0.91 mg/dL 11/17/2024   Dialysis at least twice   in the past week  Or CVVHD for >=24 hours   in the past week:     No    Bilirubin, total: 0.89 mg/dL 11/17/2024   INR: 1.07 11/16/2024   Sodium: 134 mmol/L 11/17/2024

## 2024-11-18 NOTE — SPEECH THERAPY NOTE
Speech Language/Pathology  Speech/Language Pathology  Assessment    Patient Name: Gregg Partida  Today's Date: 11/18/2024     Problem List  Principal Problem:    Atrial fibrillation with RVR (HCC)  Active Problems:    COPD (chronic obstructive pulmonary disease) (HCC)    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 disease (HCC)    Thrombocytopenia (HCC)    Electrolyte abnormality    Chronic heart failure with preserved ejection fraction (HCC)    Encephalopathy acute    Transaminitis    Alcohol withdrawal (HCC)    Ambulatory dysfunction    BPH (benign prostatic hyperplasia)    Closed fracture of one rib of left side    Hyponatremia    Acute respiratory insufficiency    Past Medical History  Past Medical History:   Diagnosis Date    Acute on chronic kidney failure  (HCC)     Alcohol withdrawal (HCC) 06/07/2019    Atrial fibrillation (HCC)     Cancer (HCC)     prostate ca,had radiation    Cardiac disease     stents,then triple bypass    COPD (chronic obstructive pulmonary disease) (HCC)     Coronary artery disease     ETOH abuse     Heart failure (HCC)     History of heart surgery     [facility] bypass [facility]    Hx of heart artery stent     2014    Hyperlipidemia     Hypertension     Hyponatremia 10/17/2019    Hypovolemic shock ([facility]) 12/22/2019    Lumbar spondylitis ([facility]) 10/13/2022    Metabolic acidosis, increased anion gap 04/21/2021    Nasal bone fracture 10/10/2022    Prostate CA ([facility])     S/P CABG x 3     2004    Sleep apnea      Past Surgical History  Past Surgical History:   Procedure Laterality Date    CARDIAC CATHETERIZATION      2 stents    CORONARY ARTERY BYPASS GRAFT  2004    TX ARTHRD ANT INTERBODY MIN DSC CRV BELOW C2 N/A 12/16/2020    Procedure: Anterior cervical discectomy with fusion C4-C7; Posterior cervical decompression and fusion C2-T2;  Surgeon: David Rowell MD;  Location: [facility];  Service: Neurosurgery    TONSILLECTOMY        Bedside Swallow Evaluation:    Summary:  Pt  presented w/ mod/severe oropharyngeal dysphagia. Currently NPO pending SLP evaluation. Pt confused upon arrival. Bilateral wrist restraints.Trials of NTL/thin via tsp, ice chips, and min amounts of puree trialed. Weakened acceptance and labial seal. Oral holding with all PO. Delayed and uncoordinated pharyngeal swallow. Multiple swallows noted with all consistencies. Immediate wet coughing and/or wet vocal quality. SLP cueing pt to cough. Suctioning required after all trials. Cognition impacting swallow at this time- pt becoming easily distracted requiring cues for attention to task. Poor/decreased secretion management noted.      Recommendations:  Diet: NPO  Meds: Non oral  Positioning:Upright  Pt to take PO/Meds only when fully alert and upright.   Oral care: Frequent 3-5x/day  Aspiration precautions  Reflux precautions  Therapy Prognosis: Guarded  Prognosis considerations: Cognition  Frequency: 3-5x/day    Consider consult w/:  Rehab  Nutrition    Goal(s):  Pt will tolerate least restrictive diet w/out s/s aspiration or oral/pharyngeal difficulties.     Dysphagia LTG  -Patient will demonstrate safe and effective oral intake (without overt s/s significant oral/pharyngeal dysphagia including s/s penetration or aspiration) for the highest appropriate diet level.     Short Term Goals:    -Patient will tolerate trials of upgraded food and/or liquid texture with no significant s/s of oral or pharyngeal dysphagia including aspiration across 1-3 diagnostic sessions     -Patient will comply with a Video/Modified Barium Swallow study if indicated for more complete assessment of swallowing anatomy/physiology/aspiration risk and to assess efficacy of treatment techniques    H&P/Admit info/ pertinent provider notes: (PMH noted above)  Gregg Partida is a 64 y.o. who presents on 11/14 with several days of shortness of breath, malaise, and intermittent chest pain. The patient is unsure of his past medical history. Chart review  "demonstrates a past medical history of coronary artery disease with history of stenting/coronary bypass, chronic obstructive pulmonary disease, atrial fibrillation on Eliquis, thrombocytopenia, type 2 diabetes, alcohol abuse, tobacco abuse, hypertension, and hyperlipidemia. The patient reports taking all medications prescribed, has not had alcohol in several weeks, and smokes two packs a day. In the emergency room, he was in atrial fibrillation with rapid ventricular response and intoxicated with an elevated ethanol level. He received Cardizem, initially with good effect, however became increasingly tachycardic, received metoprolol boluses with hypotension. He was given a total resuscitation of 4L crystalloid and albumin with transient effect. He will be transferred to the stepdown unit for further management.     Special Studies:  Chest XR 11/17/24:     IMPRESSION:     Persistent decreased mild pulmonary edema.      Previous MBS:  None found on epic     Patient's goal: \"Water!\"    Did the pt report pain? None  If yes, was nursing notified/was it addressed?    Reason for consult:  R/o aspiration  Determine safest and least restrictive diet  Change in mental status    Precautions:  Aspiration    Food Allergies: NKFA   Current Diet: NPO   Premorbid diet: Regular/thin   O2 requirement: NC   Social/Prior living Home   Voice/Speech: WNL   Follows commands: 1 step   Cognitive status: Impaired      Oral [facility] exam:  Dentition: Natural  General upper and lower facial weakness   Oral care     Items administered: NTL tsp, thins tsp, ice chips, min amounts puree      Oral stage:  Lip closure:-  Mastication:-  Bolus formation:-  Bolus control:-  Transfer:-  Oral residue:-  Pocketing:-    Pharyngeal stage:  Swallow promptness:-  Laryngeal rise:-  Wet voice:+  Throat clear:+  Cough:+  Secondary swallows:+  Audible swallows:-    Esophageal stage:  No s/s reported    Results d/w:  Pt, nursing, family, physician  "

## 2024-11-18 NOTE — ASSESSMENT & PLAN NOTE
Wt Readings from Last 3 Encounters:   11/17/24 89.2 kg (196 lb 10.4 oz)   09/19/24 86.2 kg (190 lb 1.6 oz)   09/16/24 83.3 kg (183 lb 10.3 oz)     Daily weights  PTA meds: metoprolol 50mg BID, eliquis, ranexa  Continue therapeutic lovenox until tolerating PO consistently   Close I&Os  Cardiac diet when able to tolerate PO

## 2024-11-18 NOTE — ASSESSMENT & PLAN NOTE
Chronic afib on metoprolol and eliquis  Episodes of Afib RVR this stay likely exacerbated in the setting of alcohol withdrawal   Initiated on cardizem gtt by primary team on 11/17 given refractory Afib with RVR despite escalating doses of metoprolol  Goal to wean cardizem to off  IV Metoprolol now discontinued while on cardizem  Telemetry with Aflutter, rates now 80s  Continue therapeutic lovenox and transition back to eliquis when tolerating PO

## 2024-11-18 NOTE — ASSESSMENT & PLAN NOTE
11/15-16: More encephalopathic overnight, likely secondary to alcohol withdrawal. Patient confused, hallucinating. Non-focal. Given multiple doses of ativan per [facility] protocol. Transferred to ICU for repeat phenobarb dosing  Received 130mg of phenobarb, started on precedex  11/17 given additional 65mg of phenobarb  Remains encephalopathic since admission, oriented to self, place at times but is still confused and impulsive  Goal to wean precedex to off   Consider other etiologies of encephalopathy  Consider CT head, however currently non focal  Infectious workup unremarkable this far, however concern for possible aspiration this stay   Ammonia 36  Consideration for baseline confusion/agitation given previous hospital notes   Mental status waxing/waning but overall less agitated  Close neuro checks  [facility] ICU

## 2024-11-18 NOTE — ASSESSMENT & PLAN NOTE
Lab Results   Component Value Date    HGBA1C 5.2 11/14/2024       Recent Labs     11/17/24  0703 11/17/24  1105 11/17/24  1559 11/17/24 2057   POCGLU 112 99 107 105       Blood Sugar Average: Last 72 hrs:  (P) [id number]  Continue SSI ACHS  Goal BG <180

## 2024-11-18 NOTE — ASSESSMENT & PLAN NOTE
Wt Readings from Last 3 Encounters:   11/18/24 87.6 kg (193 lb 2 oz)   09/19/24 86.2 kg (190 lb 1.6 oz)   09/16/24 83.3 kg (183 lb 10.3 oz)   8/16/2024 TTE: EF visibly estimated 65% with severe left atrial dilatation  BNP 1150  mild volume overload secondary to tachycardia  Will give Lasix 40 mg IV times one and continue to monitor  continue Lopressor 50 mg Q 12 hours  monitor I and O, daily weights and labs  CHF education

## 2024-11-18 NOTE — PROGRESS NOTES
Critical Care Attending Note; Victor M Lainez   Note Date: 24  Note Time: 10:20 AM    Patient: Gregg Partida  Age, : 64 y.o., 1960 MRN: [id number] Code Status: Level 1 - Full Code Patient Location: ICU 11/ICU 11   Hospital LOS:4 days  ]   Patient seen and examined, medical record reviewed, discussed with house staff and nursing staff.         HPI   CC: EtOH  64M  with PMH of EtOH abuse, DM, COPD, HFpEF, Afib who presents with EtOH withdrawal.    Main ICU Plans:       #Neuro  EtOH withdrawal - s/p Phenobarbital - Last drink   - Precedex - wean   - CIWA Phenobarbital PRN  - Thiamine/Folic Acid    #Card  Afib - RVR  - Cardizem gtt - transition Metoprolol IVp  - Lovenox    #GI  Transaminitis  -RUQUS -   - Hep Panel     #MSK   Rib Fx - Pain control    #DVT/GI ppx  Lovenox    #Lines/Tubes/Drains:   Invasive Devices       Peripheral Intravenous Line  Duration             Peripheral IV 24 Left Forearm 1 day    Long-Dwell Peripheral IV (Midline) 24 Left Basilic <1 day              Drain  Duration             External Urinary Catheter Medium 1 day                    #Nutrition:   Diet NPO; Sips of clear liquids        #Code Status:   Level 1 - Full Code    #Dispo:   ICU        Victor M Lainez MD  Pulmonary, Critical Care    Critical care time, excluding procedures, teaching, family meetings, and excludes any prior time recorded by the AP/resident, 35 minutes. Upon my evaluation, this patient has a high probability of imminent or life-threatening deterioration due to above problems which required my direct attention, intervention, and personal management.   Impression/Active Problems:    EtOH withdrawal   DM   COPD   HFpEF   Afib    Transaminitis      Physical Exam:     Vital Signs:   Weight: 87.6 kg (193 lb 2 oz)  IBW: Ideal body weight: 82.2 kg (181 lb 3.5 oz)  Adjusted ideal body weight: 84.4 kg (185 lb 15.7 oz)  Temp:  [96.8 °F (36 °C)-99.6 °F (37.6 °C)] 99.6 °F (37.6 °C)  HR:   [] 112  BP: (138-178)/() 174/133  Resp:  [21-46] 25  SpO2:  [92 %-100 %] 93 %  O2 Device: Nasal cannula  Nasal Cannula O2 Flow Rate (L/min):  [3 L/min-5 L/min] 5 L/min  General: NAD  Neuro: AxO 2  Heart: RRR  Lungs: No distress  Abdomen: Soft   Extremities: No edema                Ventilator Settings:         Results from last 7 days   Lab Units 11/14/24  0438   PO2 HALIE mm Hg 56.0*     Radiologic Images Reviewed:    CXR   Mild Pulmonary edema    CT Head  No acute intracranial abnormality.     CT Spine   No acute cervical spine fracture or traumatic malalignment.     CT Chest    1. Mildly displaced left lateral seventh rib fracture.   2. No hemothorax or pneumothorax.   3. No acute trauma in the abdomen or pelvis.   Input / Output:     Intake/Output Summary (Last 24 hours) at 11/18/2024 1020  Last data filed at 11/18/2024 0601  Gross per 24 hour   Intake 766.6 ml   Output 1100 ml   Net -333.4 ml            Infusions:  dexmedetomidine, 0.1-1.2 mcg/kg/hr, Last Rate: 0.2 mcg/kg/hr (11/18/24 0915)  diltiazem, 1-15 mg/hr, Last Rate: 10 mg/hr (11/18/24 0255)      Scheduled Medications:  Current Facility-Administered Medications   Medication Dose Route Frequency Provider Last Rate    acetaminophen  650 mg Oral Q6H PRN CHELSEA Christopher      dexmedetomidine  0.1-1.2 mcg/kg/hr Intravenous Titrated CHELSEA Christopher 0.2 mcg/kg/hr (11/18/24 0915)    diltiazem  1-15 mg/hr Intravenous Titrated Khushboo Quintero PA-C 10 mg/hr (11/18/24 0255)    enoxaparin  1 mg/kg Subcutaneous Q12H Counts include 234 beds at the [facility] Khushboo Quintero PA-C      ferrous sulfate  325 mg Oral Daily With Breakfast CHELSEA Christopher      fluticasone-vilanterol  1 puff Inhalation Daily CHELSEA Christopher      folic acid 1 mg, thiamine (VITAMIN B1) 100 mg in sodium chloride 0.9 % 100 mL IV piggyback   Intravenous Daily Khushboo Quintero PA-C Stopped (11/17/24 3340)    insulin lispro  1-6 Units Subcutaneous 4x Daily  "(AC & HS) CHELSEA Christopher      levalbuterol  1.25 mg Nebulization Q6H Victor M Lainez MD      magnesium sulfate  4 g Intravenous Once CHELSEA Pabon 4 g (11/18/24 0934)    nicotine  1 patch Transdermal Daily CHELSEA Christopher      pantoprazole  40 mg Oral BID AC CHELSEA Christopher      senna-docusate sodium  1 tablet Oral Daily PRN CHELSEA Christopher      sodium chloride  4 mL Nebulization Q6H CHELSEA Pabon      tamsulosin  0.4 mg Oral Daily CHELSEA Christopher      trimethobenzamide  200 mg Intramuscular Q6H PRN CHELSEA Christopher         PRN Medications:    acetaminophen    senna-docusate sodium    trimethobenzamide    Labs Reviewed:  Results from last 7 days   Lab Units 11/18/24  0526 11/17/24  0526 11/16/24  0340   WBC Thousand/uL 9.09 8.67 6.35   HEMOGLOBIN g/dL 10.5* 11.2* 10.3*   HEMATOCRIT % 32.2* 33.4* 31.2*   PLATELETS Thousands/uL 117* 89* 73*      Results from last 7 days   Lab Units 11/18/24  0526 11/17/24  2349 11/17/24  1559 11/17/24  0526 11/16/24  1719   SODIUM mmol/L 136 134* 135 129* 132*   CO2 mmol/L 23 24 25 24 29   BUN mg/dL 25 26* 23 24 23   CALCIUM mg/dL 8.2* 8.3* 8.2* 8.3* 8.3*   MAGNESIUM mg/dL 1.5*  --   --  1.7* 1.6*         Invalid input(s): \"ASTSGOT\", \"ALTSGPT\"LABRCNTIP@ ,alkphos:3,tbilirubin:3,dbilirubin:3)@  Results from last 7 days   Lab Units 11/18/24  0526 11/16/24  1719 11/14/24  0053   INR  1.24* 1.07 1.13           Invalid input(s): \"TROPT\", \"PBNP\"             I have personally seen and examined the patient on (11/18/24 between 2033-9279). I discussed the patient with the AP/resident including, but not limited to, verifying findings; reviewing labs and x-rays; discussing with consultants; developing the plan of care with the bedside nurse; and discussing treatment plan with patient or surrogate.  I have reviewed the note and assessment performed by the AP/resident and " agree with the AP/resident’s documented findings and plan of care with the above additions/exceptions. Please see my comments for details and adjustments.

## 2024-11-18 NOTE — ASSESSMENT & PLAN NOTE
"Currently on 3L NC with slightly increased WOB  11/17 CXR \"Persistent decreased mild pulmonary edema\"   Consideration for diuretics given large volume resuscitation earlier this stay   Continue nebs  Hold steroids given no wheezing on exam   Pulmonary hygiene  Aspiration precautions  Speech eval to assess swallowing   "

## 2024-11-18 NOTE — ASSESSMENT & PLAN NOTE
Chronic hyponatremia likely secondary to alcohol use  Sodium 129 yesterday, briefly on 1.8% NSS which was d/c'ed once sodium reached 135  Continue to trend   Close I&O

## 2024-11-18 NOTE — ASSESSMENT & PLAN NOTE
patient with history of recurrent atrial fibrillation  8/16/2024 TTE: EF visibly estimated 65% with severe dilatation of left atrium  unclear last time patient took his medications secondary to intoxication  telemetry demonstrates rapid atrial fibrillation rates 130s to 160s  patient started on Cardizem drip 11/15/2024  rates improved with Cardizem drip at 10 mg per hour, will continue drip until patient is more alert and oriented  PO Lopressor changed to IV due to mentation  patient currently on Lovenox weight based Q 12 hours due to meditation  would avoid amiodarone secondary to patient's age and history of chronic alcoholism  continue monitor telemetry

## 2024-11-18 NOTE — PROGRESS NOTES
Progress Note - Critical Care/ICU   Name: Gregg Partida 64 y.o. male I MRN: [id number]  Unit/Bed#: ICU 11 I Date of Admission: 11/14/2024   Date of Service: 11/18/2024 I Hospital Day: 4      Assessment & Plan  Alcohol withdrawal (HCC)  Admitted on 11/14 with signs/symptoms of alcohol withdrawal. Given phenobarbital load (260mg, 130mg, 130mg) with improved symptoms. Downgraded to the floor  11/15-16: Given multiple doses of Ativan over the last 24 hours per CIWA protocol. Given escalating doses of ativan this morning and therefore transitioned back to SD1 for close monitoring  11/16: Give 130mg of phenobarbitol now for alcohol withdrawal. Precedex gtt at 0.04  11/16-17: required precedex gtt for agitation, increased overnight without improvement. Given additional 65mg of phenobarb at 0330  Continue thiamine/folic acid   Encourage cessation    Patient's MELD Score is: 7    MELD Score 90-day mortality   <=9 1.9%   10-19 6.0%   20-29 19.6%   30-39 52.6%   >=40 71.3%     MELD Score Component Values:  Component Value Date   Creatinine: 0.91 mg/dL 11/17/2024   Dialysis at least twice   in the past week  Or CVVHD for >=24 hours   in the past week:     No    Bilirubin, total: 0.89 mg/dL 11/17/2024   INR: 1.07 11/16/2024   Sodium: 134 mmol/L 11/17/2024      Encephalopathy acute  11/15-16: More encephalopathic overnight, likely secondary to alcohol withdrawal. Patient confused, hallucinating. Non-focal. Given multiple doses of ativan per CIWA protocol. Transferred to ICU for repeat phenobarb dosing  Received 130mg of phenobarb, started on precedex  11/17 given additional 65mg of phenobarb  Remains encephalopathic since admission, oriented to self, place at times but is still confused and impulsive  Goal to wean precedex to off   Consider other etiologies of encephalopathy  Consider CT head, however currently non focal  Infectious workup unremarkable this far, however concern for possible aspiration this stay   Ammonia  "36  Consideration for baseline confusion/agitation given previous hospital notes   Mental status waxing/waning but overall less agitated  Close neuro checks  CAM ICU  Atrial fibrillation with RVR (HCC)  Chronic afib on metoprolol and eliquis  Episodes of Afib RVR this stay likely exacerbated in the setting of alcohol withdrawal   Initiated on cardizem gtt by primary team on 11/17 given refractory Afib with RVR despite escalating doses of metoprolol  Goal to wean cardizem to off  IV Metoprolol now discontinued while on cardizem  Telemetry with Aflutter, rates now 80s  Continue therapeutic lovenox and transition back to eliquis when tolerating PO   Thrombocytopenia (HCC)  Appears chronic likely in the setting of alcohol use   Follow daily  Electrolyte abnormality  Aggressively replete electrolytes  Goal K>4, Mg>2  Acute respiratory insufficiency  Currently on 3L NC with slightly increased WOB  11/17 CXR \"Persistent decreased mild pulmonary edema\"   Consideration for diuretics given large volume resuscitation earlier this stay   Continue nebs  Hold steroids given no wheezing on exam   Pulmonary hygiene  Aspiration precautions  Speech eval to assess swallowing   COPD (chronic obstructive pulmonary disease) (HCC)  Continue current inhaler/nebulizer regimen  Wean oxygen for goal SpO2>88%  Chronic heart failure with preserved ejection fraction ([facility])  Wt Readings from Last 3 Encounters:   11/17/24 89.2 kg (196 lb 10.4 oz)   09/19/24 86.2 kg (190 lb 1.6 oz)   09/16/24 83.3 kg (183 lb 10.3 oz)     Daily weights  PTA meds: metoprolol 50mg BID, eliquis, ranexa  Continue therapeutic lovenox until tolerating PO consistently   Close I&Os  Cardiac diet when able to tolerate PO         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 disease ([facility])  Lab Results   Component Value Date    HGBA1C 5.2 11/14/2024       Recent Labs     11/17/24  0703 11/17/24  1105 11/17/24  1559 11/17/24 2057   POCGLU 112 99 107 105       Blood " "Sugar Average: Last 72 hrs:  (P) 138.9827549778492146  Continue SSI ACHS  Goal BG <180  Ambulatory dysfunction  PT/OT when appropriate  BPH (benign prostatic hyperplasia)  Continue home Flomax  Urinary retention protocol  Transaminitis  Increasing transaminitis 11/16, unclear etiology. ? Related to alcoholic hepatitis  RUQ US pending  LFTs now downtrending again, no abdominal pain on exam  Closed fracture of one rib of left side  Pain control as needed  Hyponatremia  Chronic hyponatremia likely secondary to alcohol use  Sodium 129 yesterday, briefly on 1.8% NSS which was d/c'ed once sodium reached 135  Continue to trend   Close I&O  Disposition: Stepdown Level 1    ICU Core Measures     A: Assess, Prevent, and Manage Pain Has pain been assessed? Yes  Need for changes to pain regimen? No   B: Both SAT/SAT  N/A   C: Choice of Sedation RASS Goal: 0 Alert and Calm  Need for changes to sedation or analgesia regimen? Yes   D: Delirium CAM-ICU: Positive   E: Early Mobility  Plan for early mobility? Yes   F: Family Engagement Plan for family engagement today? Yes         Prophylaxis:  VTE VTE covered by:  enoxaparin, Subcutaneous, 90 mg at 11/17/24 2121       Stress Ulcer  covered bypantoprazole (PROTONIX) 40 mg tablet [[id number]] (Long-Term Med), pantoprazole (PROTONIX) EC tablet 40 mg [[id number]]         24 Hour Events : Mental status improving but remains on precedex for confusion/impulsivity. Currently on 5L NC. Required intermittent NT suctioning for upper airway secretion clearance.  Afib with RVR yesterday despite aggressive IV metoprolol dosing. Initiated on a cardizem gtt with improved HR control. IV metoprolol discontinued.     Subjective   Review of Systems: Review of Systems   Constitutional:         \"Give me water\"       Objective :                   Vitals I/O      Most Recent Min/Max in 24hrs   Temp 98.1 °F (36.7 °C) Temp  Min: 96.8 °F (36 °C)  Max: 98.7 °F (37.1 °C)   Pulse 82 Pulse  Min: 81  Max: 156   Resp " (!) 25 Resp  Min: 21  Max: 46   /100 BP  Min: 137/104  Max: 178/111   O2 Sat 97 % SpO2  Min: 95 %  Max: 100 %      Intake/Output Summary (Last 24 hours) at 11/18/2024 0056  Last data filed at 11/17/2024 1801  Gross per 24 hour   Intake 1503.05 ml   Output 1150 ml   Net 353.05 ml       Diet NPO; Sips of clear liquids    Invasive Monitoring           Physical Exam   Physical Exam  Eyes:      Extraocular Movements: Extraocular movements intact.      Pupils: Pupils are equal, round, and reactive to light.   Skin:     General: Skin is warm and dry.   HENT:      Head: Normocephalic and atraumatic.      Mouth/Throat:      Mouth: Mucous membranes are moist.      Comments: Poor dentition   Cardiovascular:      Rate and Rhythm: Normal rate. Rhythm irregular.      Heart sounds: Normal heart sounds.   Abdominal: General: Bowel sounds are normal.      Palpations: Abdomen is soft.   Constitutional:       Appearance: He is well-developed. He is ill-appearing.      Interventions: He is sedated.   Pulmonary:      Effort: Tachypnea, accessory muscle usage and accessory muscle usage present.      Breath sounds: Rales present.      Comments: Wet, weak cough   Neurological:      General: No focal deficit present.      Mental Status: He is oriented to person, place and time.      Comments: Oriented x 3 but still impulsive, confused           Diagnostic Studies        Lab Results: I have reviewed the following results:     Medications:  Scheduled PRN   enoxaparin, 1 mg/kg, Q12H SEDA  ferrous sulfate, 325 mg, Daily With Breakfast  fluticasone-vilanterol, 1 puff, Daily  folic acid 1 mg, thiamine (VITAMIN B1) 100 mg in sodium chloride 0.9 % 100 mL IV piggyback, , Daily  insulin lispro, 1-6 Units, 4x Daily (AC & HS)  nicotine, 1 patch, Daily  pantoprazole, 40 mg, BID AC  tamsulosin, 0.4 mg, Daily      acetaminophen, 650 mg, Q6H PRN  levalbuterol, 1.25 mg, Q8H PRN  senna-docusate sodium, 1 tablet, Daily PRN  trimethobenzamide, 200 mg, Q6H  PRN       Continuous    dexmedetomidine, 0.1-1.2 mcg/kg/hr, Last Rate: 0.8 mcg/kg/hr (11/18/24 0054)  diltiazem, 1-15 mg/hr, Last Rate: 10 mg/hr (11/17/24 1901)         Labs:   CBC    Recent Labs     11/16/24  0340 11/17/24  0526   WBC 6.35 8.67   HGB 10.3* 11.2*   HCT 31.2* 33.4*   PLT 73* 89*     BMP    Recent Labs     11/17/24  1559 11/17/24  2349   SODIUM 135 134*   K 4.5 4.4    99   CO2 25 24   AGAP 10 11   BUN 23 26*   CREATININE 0.91 0.91   CALCIUM 8.2* 8.3*       Coags    Recent Labs     11/16/24  1719   INR 1.07        Additional Electrolytes  Recent Labs     11/16/24  1719 11/17/24  0526   MG 1.6* 1.7*          Blood Gas    No recent results  No recent results LFTs  Recent Labs     11/16/24  1719 11/17/24  0526   ALT 81* 78*   * 94*   ALKPHOS 111* 186*   ALB 3.1* 3.2*   TBILI 0.65 0.89       Infectious  Recent Labs     11/17/24  0526   PROCALCITONI 0.10     Glucose  Recent Labs     11/16/24  1719 11/17/24  0526 11/17/24  1559 11/17/24  2349   GLUC 99 110 98 100

## 2024-11-18 NOTE — PROGRESS NOTES
Progress Note - Cardiology   Name: Gregg Partida 64 y.o. male I MRN: [id number]  Unit/Bed#: ICU 11 I Date of Admission: 11/14/2024   Date of Service: 11/18/2024 I Hospital Day: 4    Assessment & Plan  Atrial fibrillation with RVR ([facility])  patient with history of recurrent atrial fibrillation  8/16/2024 TTE: EF visibly estimated 65% with severe dilatation of left atrium  unclear last time patient took his medications secondary to intoxication  telemetry demonstrates rapid atrial fibrillation rates 130s to 160s  patient started on Cardizem drip 11/15/2024  rates improved with Cardizem drip at 10 mg per hour, will continue drip until patient is more alert and oriented  PO Lopressor changed to IV due to mentation  patient currently on Lovenox weight based Q 12 hours due to meditation  would avoid amiodarone secondary to patient's age and history of chronic alcoholism  continue monitor telemetry  Alcohol withdrawal ([facility])  alcohol on admission was 381  CIWA protocol ongoing  Chronic heart failure with preserved ejection fraction ([facility])  Wt Readings from Last 3 Encounters:   11/18/24 87.6 kg (193 lb 2 oz)   09/19/24 86.2 kg (190 lb 1.6 oz)   09/16/24 83.3 kg (183 lb 10.3 oz)   8/16/2024 TTE: EF visibly estimated 65% with severe left atrial dilatation  BNP 1150  mild volume overload secondary to tachycardia  Will give Lasix 40 mg IV times one and continue to monitor  continue Lopressor 50 mg Q 12 hours  monitor I and O, daily weights and labs  CHF education          COPD (chronic obstructive pulmonary disease) ([facility])  continue current inhaler regimen per primary team  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 disease ([facility])  Lab Results   Component Value Date    HGBA1C 5.2 11/14/2024       Recent Labs     11/17/24  0703 11/17/24  1105 11/17/24  1559 11/17/24 2057   POCGLU 112 99 107 105       Blood Sugar Average: Last 72 hrs:  (P) 138.3353609731478688  managed per primary team    Subjective   Chief Complaint:  Patient remains sedated on press addicts due to agitation from alcohol withdrawal. He remains an atrial flutter but rates are better controlled with IV Cardizem and Lopressor      Objective :  Temp:  [96.8 °F (36 °C)-99.6 °F (37.6 °C)] 99.6 °F (37.6 °C)  HR:  [] 100  BP: (138-178)/() 178/86  Resp:  [21-46] 26  SpO2:  [92 %-100 %] 93 %  O2 Device: Nasal cannula  Nasal Cannula O2 Flow Rate (L/min):  [3 L/min-5 L/min] 5 L/min  Orthostatic Blood Pressures      Flowsheet Row Most Recent Value   Blood Pressure 178/86 filed at 11/18/2024 1000   Patient Position - Orthostatic VS Lying filed at 11/18/2024 0800          First Weight: Weight - Scale: 84.3 kg (185 lb 13.6 oz) (11/14/24 0900)  Vitals:    11/17/24 0600 11/18/24 0551   Weight: 89.2 kg (196 lb 10.4 oz) 87.6 kg (193 lb 2 oz)     Physical Exam  Vitals and nursing note reviewed.   Constitutional:       Interventions: He is sedated.   HENT:      Right Ear: External ear normal.      Left Ear: External ear normal.   Eyes:      General:         Right eye: No discharge.         Left eye: No discharge.   Cardiovascular:      Rate and Rhythm: Normal rate. Rhythm irregular.      Pulses: Normal pulses.   Pulmonary:      Effort: No accessory muscle usage or respiratory distress.      Breath sounds: Wheezing and rhonchi present.   Skin:     Capillary Refill: Capillary refill takes less than 2 seconds.   Psychiatric:         Mood and Affect: Affect is flat.         Cognition and Memory: Cognition is impaired. Memory is impaired.           Lab Results: I have reviewed the following results:  Results from last 7 days   Lab Units 11/18/24  0526 11/17/24  0526 11/16/24  0340   WBC Thousand/uL 9.09 8.67 6.35   HEMOGLOBIN g/dL 10.5* 11.2* 10.3*   HEMATOCRIT % 32.2* 33.4* 31.2*   PLATELETS Thousands/uL 117* 89* 73*     Results from last 7 days   Lab Units 11/18/24  0526 11/17/24  2349 11/17/24  1559   POTASSIUM mmol/L 4.4 4.4 4.5   CHLORIDE mmol/L 100 99 100   CO2 mmol/L 23  24 25   BUN mg/dL 25 26* 23   CREATININE mg/dL 0.96 0.91 0.91   CALCIUM mg/dL 8.2* 8.3* 8.2*     Results from last 7 days   Lab Units 11/18/24  0526 11/16/24  1719 11/14/24  0053   INR  1.24* 1.07 1.13     Lab Results   Component Value Date    HGBA1C 5.2 11/14/2024     Lab Results   Component Value Date    CKTOTAL 258 09/14/2024    CKMB 2.0 04/22/2021    CKMBINDEX <1.0 04/22/2021    TROPONINI <0.02 06/25/2021       Telemetry: patient remains in atrial fibrillation with control ventricular response      Abigail TRAN  Cardiology

## 2024-11-18 NOTE — ASSESSMENT & PLAN NOTE
Lab Results   Component Value Date    HGBA1C 5.2 11/14/2024       Recent Labs     11/17/24  0703 11/17/24  1105 11/17/24  1559 11/17/24 2057   POCGLU 112 99 107 105       Blood Sugar Average: Last 72 hrs:  (P) 138.7300264200965603  managed per primary team

## 2024-11-19 LAB
ALBUMIN SERPL BCG-MCNC: 3 G/DL (ref 3.5–5)
ALP SERPL-CCNC: 137 U/L (ref 34–104)
ALT SERPL W P-5'-P-CCNC: 65 U/L (ref 7–52)
ANION GAP SERPL CALCULATED.3IONS-SCNC: 9 MMOL/L (ref 4–13)
AST SERPL W P-5'-P-CCNC: 52 U/L (ref 13–39)
BASOPHILS # BLD AUTO: 0.03 THOUSANDS/ÂΜL (ref 0–0.1)
BASOPHILS NFR BLD AUTO: 0 % (ref 0–1)
BILIRUB SERPL-MCNC: 0.86 MG/DL (ref 0.2–1)
BUN SERPL-MCNC: 18 MG/DL (ref 5–25)
CALCIUM ALBUM COR SERPL-MCNC: 9 MG/DL (ref 8.3–10.1)
CALCIUM SERPL-MCNC: 8.2 MG/DL (ref 8.4–10.2)
CHLORIDE SERPL-SCNC: 102 MMOL/L (ref 96–108)
CO2 SERPL-SCNC: 26 MMOL/L (ref 21–32)
CREAT SERPL-MCNC: 0.77 MG/DL (ref 0.6–1.3)
EOSINOPHIL # BLD AUTO: 0.04 THOUSAND/ÂΜL (ref 0–0.61)
EOSINOPHIL NFR BLD AUTO: 0 % (ref 0–6)
ERYTHROCYTE [DISTWIDTH] IN BLOOD BY AUTOMATED COUNT: 16.2 % (ref 11.6–15.1)
GFR SERPL CREATININE-BSD FRML MDRD: 97 ML/MIN/1.73SQ M
GLUCOSE SERPL-MCNC: 105 MG/DL (ref 65–140)
GLUCOSE SERPL-MCNC: 121 MG/DL (ref 65–140)
GLUCOSE SERPL-MCNC: 129 MG/DL (ref 65–140)
HCT VFR BLD AUTO: 30 % (ref 36.5–49.3)
HGB BLD-MCNC: 10 G/DL (ref 12–17)
IMM GRANULOCYTES # BLD AUTO: 0.09 THOUSAND/UL (ref 0–0.2)
IMM GRANULOCYTES NFR BLD AUTO: 1 % (ref 0–2)
LYMPHOCYTES # BLD AUTO: 0.71 THOUSANDS/ÂΜL (ref 0.6–4.47)
LYMPHOCYTES NFR BLD AUTO: 7 % (ref 14–44)
MAGNESIUM SERPL-MCNC: 1.7 MG/DL (ref 1.9–2.7)
MCH RBC QN AUTO: 32.9 PG (ref 26.8–34.3)
MCHC RBC AUTO-ENTMCNC: 33.3 G/DL (ref 31.4–37.4)
MCV RBC AUTO: 99 FL (ref 82–98)
MONOCYTES # BLD AUTO: 1.38 THOUSAND/ÂΜL (ref 0.17–1.22)
MONOCYTES NFR BLD AUTO: 14 % (ref 4–12)
NEUTROPHILS # BLD AUTO: 7.4 THOUSANDS/ÂΜL (ref 1.85–7.62)
NEUTS SEG NFR BLD AUTO: 78 % (ref 43–75)
NRBC BLD AUTO-RTO: 0 /100 WBCS
PLATELET # BLD AUTO: 130 THOUSANDS/UL (ref 149–390)
PMV BLD AUTO: 10.4 FL (ref 8.9–12.7)
POTASSIUM SERPL-SCNC: 3.7 MMOL/L (ref 3.5–5.3)
PROT SERPL-MCNC: 6 G/DL (ref 6.4–8.4)
RBC # BLD AUTO: 3.04 MILLION/UL (ref 3.88–5.62)
SODIUM SERPL-SCNC: 137 MMOL/L (ref 135–147)
WBC # BLD AUTO: 9.65 THOUSAND/UL (ref 4.31–10.16)

## 2024-11-19 PROCEDURE — 94668 MNPJ CHEST WALL SBSQ: CPT

## 2024-11-19 PROCEDURE — 99232 SBSQ HOSP IP/OBS MODERATE 35: CPT | Performed by: INTERNAL MEDICINE

## 2024-11-19 PROCEDURE — 94664 DEMO&/EVAL PT USE INHALER: CPT

## 2024-11-19 PROCEDURE — 87522 HEPATITIS C REVRS TRNSCRPJ: CPT

## 2024-11-19 PROCEDURE — 92526 ORAL FUNCTION THERAPY: CPT

## 2024-11-19 PROCEDURE — 99233 SBSQ HOSP IP/OBS HIGH 50: CPT | Performed by: STUDENT IN AN ORGANIZED HEALTH CARE EDUCATION/TRAINING PROGRAM

## 2024-11-19 PROCEDURE — 85025 COMPLETE CBC W/AUTO DIFF WBC: CPT | Performed by: NURSE PRACTITIONER

## 2024-11-19 PROCEDURE — 94760 N-INVAS EAR/PLS OXIMETRY 1: CPT

## 2024-11-19 PROCEDURE — 94640 AIRWAY INHALATION TREATMENT: CPT

## 2024-11-19 PROCEDURE — 82948 REAGENT STRIP/BLOOD GLUCOSE: CPT

## 2024-11-19 PROCEDURE — 94669 MECHANICAL CHEST WALL OSCILL: CPT

## 2024-11-19 PROCEDURE — 80053 COMPREHEN METABOLIC PANEL: CPT | Performed by: NURSE PRACTITIONER

## 2024-11-19 PROCEDURE — NC001 PR NO CHARGE: Performed by: STUDENT IN AN ORGANIZED HEALTH CARE EDUCATION/TRAINING PROGRAM

## 2024-11-19 PROCEDURE — 87521 HEPATITIS C PROBE&RVRS TRNSC: CPT

## 2024-11-19 PROCEDURE — 83735 ASSAY OF MAGNESIUM: CPT | Performed by: NURSE PRACTITIONER

## 2024-11-19 RX ORDER — DILTIAZEM HYDROCHLORIDE 30 MG/1
30 TABLET, FILM COATED ORAL ONCE
Status: COMPLETED | OUTPATIENT
Start: 2024-11-19 | End: 2024-11-19

## 2024-11-19 RX ORDER — DILTIAZEM HCL 60 MG
60 TABLET ORAL EVERY 6 HOURS SCHEDULED
Status: DISCONTINUED | OUTPATIENT
Start: 2024-11-19 | End: 2024-11-19

## 2024-11-19 RX ORDER — TAMSULOSIN HYDROCHLORIDE 0.4 MG/1
0.4 CAPSULE ORAL DAILY
Status: DISCONTINUED | OUTPATIENT
Start: 2024-11-20 | End: 2024-11-19

## 2024-11-19 RX ORDER — POTASSIUM CHLORIDE 14.9 MG/ML
20 INJECTION INTRAVENOUS ONCE
Status: COMPLETED | OUTPATIENT
Start: 2024-11-19 | End: 2024-11-19

## 2024-11-19 RX ORDER — INSULIN LISPRO 100 [IU]/ML
1-6 INJECTION, SOLUTION INTRAVENOUS; SUBCUTANEOUS
Status: DISCONTINUED | OUTPATIENT
Start: 2024-11-19 | End: 2024-11-25 | Stop reason: HOSPADM

## 2024-11-19 RX ORDER — CALCIUM GLUCONATE 20 MG/ML
2 INJECTION, SOLUTION INTRAVENOUS ONCE
Status: COMPLETED | OUTPATIENT
Start: 2024-11-19 | End: 2024-11-19

## 2024-11-19 RX ORDER — MAGNESIUM SULFATE HEPTAHYDRATE 40 MG/ML
4 INJECTION, SOLUTION INTRAVENOUS ONCE
Status: COMPLETED | OUTPATIENT
Start: 2024-11-19 | End: 2024-11-19

## 2024-11-19 RX ORDER — FOLIC ACID 1 MG/1
1 TABLET ORAL DAILY
Status: DISCONTINUED | OUTPATIENT
Start: 2024-11-20 | End: 2024-11-25 | Stop reason: HOSPADM

## 2024-11-19 RX ORDER — PANTOPRAZOLE SODIUM 40 MG/1
40 TABLET, DELAYED RELEASE ORAL
Status: DISCONTINUED | OUTPATIENT
Start: 2024-11-19 | End: 2024-11-19

## 2024-11-19 RX ORDER — DIGOXIN 0.25 MG/ML
250 INJECTION INTRAMUSCULAR; INTRAVENOUS ONCE
Status: COMPLETED | OUTPATIENT
Start: 2024-11-19 | End: 2024-11-19

## 2024-11-19 RX ORDER — INSULIN LISPRO 100 [IU]/ML
1-5 INJECTION, SOLUTION INTRAVENOUS; SUBCUTANEOUS EVERY 6 HOURS SCHEDULED
Status: DISCONTINUED | OUTPATIENT
Start: 2024-11-19 | End: 2024-11-19

## 2024-11-19 RX ORDER — HYDRALAZINE HYDROCHLORIDE 20 MG/ML
5 INJECTION INTRAMUSCULAR; INTRAVENOUS ONCE
Status: COMPLETED | OUTPATIENT
Start: 2024-11-19 | End: 2024-11-19

## 2024-11-19 RX ORDER — INSULIN LISPRO 100 [IU]/ML
1-6 INJECTION, SOLUTION INTRAVENOUS; SUBCUTANEOUS
Status: DISCONTINUED | OUTPATIENT
Start: 2024-11-19 | End: 2024-11-19

## 2024-11-19 RX ORDER — RANOLAZINE 500 MG/1
500 TABLET, EXTENDED RELEASE ORAL EVERY 12 HOURS
Status: DISCONTINUED | OUTPATIENT
Start: 2024-11-19 | End: 2024-11-25 | Stop reason: HOSPADM

## 2024-11-19 RX ORDER — DILTIAZEM HYDROCHLORIDE 5 MG/ML
20 INJECTION INTRAVENOUS ONCE
Status: COMPLETED | OUTPATIENT
Start: 2024-11-19 | End: 2024-11-19

## 2024-11-19 RX ORDER — LANOLIN ALCOHOL/MO/W.PET/CERES
100 CREAM (GRAM) TOPICAL DAILY
Status: DISCONTINUED | OUTPATIENT
Start: 2024-11-20 | End: 2024-11-25 | Stop reason: HOSPADM

## 2024-11-19 RX ORDER — GABAPENTIN 300 MG/1
300 CAPSULE ORAL DAILY
Status: DISCONTINUED | OUTPATIENT
Start: 2024-11-19 | End: 2024-11-25 | Stop reason: HOSPADM

## 2024-11-19 RX ORDER — LABETALOL HYDROCHLORIDE 5 MG/ML
10 INJECTION, SOLUTION INTRAVENOUS EVERY 4 HOURS PRN
Status: DISCONTINUED | OUTPATIENT
Start: 2024-11-19 | End: 2024-11-25 | Stop reason: HOSPADM

## 2024-11-19 RX ORDER — METOPROLOL TARTRATE 50 MG
50 TABLET ORAL EVERY 12 HOURS
Status: DISCONTINUED | OUTPATIENT
Start: 2024-11-19 | End: 2024-11-20

## 2024-11-19 RX ORDER — DILTIAZEM HYDROCHLORIDE 30 MG/1
30 TABLET, FILM COATED ORAL EVERY 6 HOURS SCHEDULED
Status: DISCONTINUED | OUTPATIENT
Start: 2024-11-19 | End: 2024-11-19

## 2024-11-19 RX ORDER — ASPIRIN 81 MG/1
81 TABLET, CHEWABLE ORAL DAILY
Status: DISCONTINUED | OUTPATIENT
Start: 2024-11-19 | End: 2024-11-25 | Stop reason: HOSPADM

## 2024-11-19 RX ORDER — DILTIAZEM HYDROCHLORIDE 5 MG/ML
15 INJECTION INTRAVENOUS ONCE
Status: COMPLETED | OUTPATIENT
Start: 2024-11-19 | End: 2024-11-19

## 2024-11-19 RX ORDER — INSULIN LISPRO 100 [IU]/ML
1-5 INJECTION, SOLUTION INTRAVENOUS; SUBCUTANEOUS
Status: DISCONTINUED | OUTPATIENT
Start: 2024-11-19 | End: 2024-11-25 | Stop reason: HOSPADM

## 2024-11-19 RX ADMIN — DILTIAZEM HYDROCHLORIDE 15 MG/HR: 5 INJECTION, SOLUTION INTRAVENOUS at 05:02

## 2024-11-19 RX ADMIN — RANOLAZINE 500 MG: 500 TABLET, FILM COATED, EXTENDED RELEASE ORAL at 11:45

## 2024-11-19 RX ADMIN — DILTIAZEM HYDROCHLORIDE 15 MG: 5 INJECTION INTRAVENOUS at 11:52

## 2024-11-19 RX ADMIN — PANTOPRAZOLE SODIUM 40 MG: 40 TABLET, DELAYED RELEASE ORAL at 06:12

## 2024-11-19 RX ADMIN — MAGNESIUM SULFATE HEPTAHYDRATE 4 G: 40 INJECTION, SOLUTION INTRAVENOUS at 07:58

## 2024-11-19 RX ADMIN — RANOLAZINE 500 MG: 500 TABLET, FILM COATED, EXTENDED RELEASE ORAL at 22:10

## 2024-11-19 RX ADMIN — GABAPENTIN 300 MG: 300 CAPSULE ORAL at 11:45

## 2024-11-19 RX ADMIN — ENOXAPARIN SODIUM 90 MG: 100 INJECTION SUBCUTANEOUS at 08:02

## 2024-11-19 RX ADMIN — Medication 4 ML: at 01:30

## 2024-11-19 RX ADMIN — TAMSULOSIN HYDROCHLORIDE 0.4 MG: 0.4 CAPSULE ORAL at 08:48

## 2024-11-19 RX ADMIN — GLYCERIN 1 DROP: .002; .002; .01 SOLUTION/ DROPS OPHTHALMIC at 19:45

## 2024-11-19 RX ADMIN — HYDRALAZINE HYDROCHLORIDE 5 MG: 20 INJECTION INTRAMUSCULAR; INTRAVENOUS at 01:36

## 2024-11-19 RX ADMIN — ASPIRIN 81 MG CHEWABLE TABLET 81 MG: 81 TABLET CHEWABLE at 11:45

## 2024-11-19 RX ADMIN — METOPROLOL TARTRATE 50 MG: 50 TABLET, FILM COATED ORAL at 11:45

## 2024-11-19 RX ADMIN — Medication 4 ML: at 19:12

## 2024-11-19 RX ADMIN — DILTIAZEM HYDROCHLORIDE 30 MG: 30 TABLET ORAL at 15:18

## 2024-11-19 RX ADMIN — APIXABAN 5 MG: 5 TABLET, FILM COATED ORAL at 11:45

## 2024-11-19 RX ADMIN — DILTIAZEM HYDROCHLORIDE 30 MG: 30 TABLET ORAL at 13:49

## 2024-11-19 RX ADMIN — DEXMEDETOMIDINE HYDROCHLORIDE 1 MCG/KG/HR: 4 INJECTION, SOLUTION INTRAVENOUS at 02:40

## 2024-11-19 RX ADMIN — FERROUS SULFATE TAB 325 MG (65 MG ELEMENTAL FE) 325 MG: 325 (65 FE) TAB at 08:48

## 2024-11-19 RX ADMIN — Medication 4 ML: at 13:18

## 2024-11-19 RX ADMIN — LEVALBUTEROL HYDROCHLORIDE 1.25 MG: 1.25 SOLUTION RESPIRATORY (INHALATION) at 19:12

## 2024-11-19 RX ADMIN — DEXMEDETOMIDINE HYDROCHLORIDE 0.7 MCG/KG/HR: 4 INJECTION, SOLUTION INTRAVENOUS at 08:46

## 2024-11-19 RX ADMIN — LEVALBUTEROL HYDROCHLORIDE 1.25 MG: 1.25 SOLUTION RESPIRATORY (INHALATION) at 13:18

## 2024-11-19 RX ADMIN — LEVALBUTEROL HYDROCHLORIDE 1.25 MG: 1.25 SOLUTION RESPIRATORY (INHALATION) at 07:46

## 2024-11-19 RX ADMIN — FLUTICASONE FUROATE AND VILANTEROL TRIFENATATE 1 PUFF: 200; 25 POWDER RESPIRATORY (INHALATION) at 08:49

## 2024-11-19 RX ADMIN — APIXABAN 5 MG: 5 TABLET, FILM COATED ORAL at 18:15

## 2024-11-19 RX ADMIN — DIGOXIN 250 MCG: 0.25 INJECTION INTRAMUSCULAR; INTRAVENOUS at 22:10

## 2024-11-19 RX ADMIN — DILTIAZEM HYDROCHLORIDE 10 MG/HR: 5 INJECTION, SOLUTION INTRAVENOUS at 17:13

## 2024-11-19 RX ADMIN — Medication 4 ML: at 07:46

## 2024-11-19 RX ADMIN — PANTOPRAZOLE SODIUM 40 MG: 40 TABLET, DELAYED RELEASE ORAL at 15:18

## 2024-11-19 RX ADMIN — DILTIAZEM HYDROCHLORIDE 60 MG: 60 TABLET ORAL at 16:42

## 2024-11-19 RX ADMIN — DIGOXIN 250 MCG: 0.25 INJECTION INTRAMUSCULAR; INTRAVENOUS at 21:00

## 2024-11-19 RX ADMIN — POTASSIUM CHLORIDE 20 MEQ: 14.9 INJECTION, SOLUTION INTRAVENOUS at 09:51

## 2024-11-19 RX ADMIN — DILTIAZEM HYDROCHLORIDE 20 MG: 5 INJECTION INTRAVENOUS at 16:38

## 2024-11-19 RX ADMIN — METOPROLOL TARTRATE 50 MG: 50 TABLET, FILM COATED ORAL at 22:10

## 2024-11-19 RX ADMIN — LEVALBUTEROL HYDROCHLORIDE 1.25 MG: 1.25 SOLUTION RESPIRATORY (INHALATION) at 01:30

## 2024-11-19 RX ADMIN — CALCIUM GLUCONATE 2 G: 20 INJECTION, SOLUTION INTRAVENOUS at 07:56

## 2024-11-19 RX ADMIN — DILTIAZEM HYDROCHLORIDE 10 MG/HR: 5 INJECTION, SOLUTION INTRAVENOUS at 18:15

## 2024-11-19 NOTE — ASSESSMENT & PLAN NOTE
patient with history of recurrent atrial fibrillation  8/16/2024 TTE: EF visibly estimated 65% with severe dilatation of left atrium  unclear last time patient took his medications secondary to intoxication  telemetry demonstrates rapid atrial fibrillation rates 130s to 160s  patient started on Cardizem drip 11/15/2024  rates improved with Cardizem drip at 10 mg per hour, will continue drip until patient is more alert and oriented  PO Lopressor changed to IV due to mentation  patient currently on Lovenox weight based Q 12 hours due to meditation  would avoid amiodarone secondary to patient's age and history of chronic alcoholism  continue monitor telemetry  11/19/2024 patient more alert and oriented, remains in atrial fibrillation.  Previously failed swallow evaluation, but reevaluation today on 11/19/2024, patient was better and can start on diet.  Will resume oral medications  discuss with patient possible cardioversion, he differs and declines procedure at this time.  Will follow rate control strategy

## 2024-11-19 NOTE — PROGRESS NOTES
Progress Note - Cardiology   Name: Gregg Partida 64 y.o. male I MRN: [id number]  Unit/Bed#: ICU 11 I Date of Admission: 11/14/2024   Date of Service: 11/19/2024 I Hospital Day: 5    Assessment & Plan  Atrial fibrillation with RVR (HCC)  patient with history of recurrent atrial fibrillation  8/16/2024 TTE: EF visibly estimated 65% with severe dilatation of left atrium  unclear last time patient took his medications secondary to intoxication  telemetry demonstrates rapid atrial fibrillation rates 130s to 160s  patient started on Cardizem drip 11/15/2024  rates improved with Cardizem drip at 10 mg per hour, will continue drip until patient is more alert and oriented  PO Lopressor changed to IV due to mentation  patient currently on Lovenox weight based Q 12 hours due to meditation  would avoid amiodarone secondary to patient's age and history of chronic alcoholism  continue monitor telemetry  11/19/2024 patient more alert and oriented, remains in atrial fibrillation.  Previously failed swallow evaluation, but reevaluation today on 11/19/2024, patient was better and can start on diet.  Will resume oral medications  discuss with patient possible cardioversion, he differs and declines procedure at this time.  Will follow rate control strategy  Alcohol withdrawal (HCC)  alcohol on admission was 381  CIWA protocol ongoing  Chronic heart failure with preserved ejection fraction (HCC)  Wt Readings from Last 3 Encounters:   11/19/24 87.4 kg (192 lb 10.9 oz)   09/19/24 86.2 kg (190 lb 1.6 oz)   09/16/24 83.3 kg (183 lb 10.3 oz)   8/16/2024 TTE: EF visibly estimated 65% with severe left atrial dilatation  BNP 1150  patient received Lasix 40 mg IV times one with good response on 11/17/2024  continue Lopressor 50 mg Q 12 hours  monitor I and O, daily weights and labs  CHF education          COPD (chronic obstructive pulmonary disease) ([facility])  continue current inhaler regimen per primary team  Type 2 diabetes mellitus with  diabetic chronic kidney disease (HCC)  Lab Results   Component Value Date    HGBA1C 5.2 11/14/2024       Recent Labs     11/17/24 2057 11/18/24  1157 11/18/24  1557 11/18/24  2019   POCGLU 105 121 118 129       Blood Sugar Average: Last 72 hrs:  (P) 115.6093412220743336  managed per primary team    Subjective   Chief Complaint: No specific cardiac complaints. Patient states he is willing to undergo outpatient drug and alcohol rehab.    Patient remains in atrial fibrillation, discuss possibility of JENNY guided cardioversion which patient declined. Will attempt rate control strategy at this time.      Objective :  Temp:  [97.5 °F (36.4 °C)-99.2 °F (37.3 °C)] 97.7 °F (36.5 °C)  HR:  [] 157  BP: (141-196)/() 167/88  Resp:  [17-31] 17  SpO2:  [90 %-100 %] 90 %  O2 Device: Nasal cannula  Nasal Cannula O2 Flow Rate (L/min):  [4 L/min-5 L/min] 4 L/min  Orthostatic Blood Pressures      Flowsheet Row Most Recent Value   Blood Pressure 167/88 filed at 11/19/2024 0900   Patient Position - Orthostatic VS Lying filed at 11/19/2024 0800          First Weight: Weight - Scale: 84.3 kg (185 lb 13.6 oz) (11/14/24 0900)  Vitals:    11/18/24 0551 11/19/24 0500   Weight: 87.6 kg (193 lb 2 oz) 87.4 kg (192 lb 10.9 oz)     Physical Exam  Vitals and nursing note reviewed.   Constitutional:       Appearance: Normal appearance. He is normal weight. He is ill-appearing (Chronically).   HENT:      Right Ear: External ear normal.      Left Ear: External ear normal.   Eyes:      General: No scleral icterus.        Right eye: No discharge.         Left eye: No discharge.   Cardiovascular:      Rate and Rhythm: Tachycardia present. Rhythm irregular.      Pulses: Normal pulses.      Heart sounds: Murmur heard.   Pulmonary:      Effort: Pulmonary effort is normal. No accessory muscle usage or respiratory distress.      Breath sounds: Examination of the right-lower field reveals decreased breath sounds. Examination of the left-lower field  reveals decreased breath sounds. Decreased breath sounds and rhonchi present.   Abdominal:      General: Bowel sounds are normal. There is no distension.      Palpations: Abdomen is soft.   Musculoskeletal:      Right lower leg: No edema.      Left lower leg: No edema.   Skin:     General: Skin is warm and dry.      Capillary Refill: Capillary refill takes less than 2 seconds.   Neurological:      General: No focal deficit present.      Mental Status: He is alert. Mental status is at baseline.   Psychiatric:         Mood and Affect: Mood normal.           Lab Results: I have reviewed the following results:  Results from last 7 days   Lab Units 11/19/24  0505 11/18/24  0526 11/17/24  0526   WBC Thousand/uL 9.65 9.09 8.67   HEMOGLOBIN g/dL 10.0* 10.5* 11.2*   HEMATOCRIT % 30.0* 32.2* 33.4*   PLATELETS Thousands/uL 130* 117* 89*     Results from last 7 days   Lab Units 11/19/24  0505 11/18/24  1950 11/18/24  0526   POTASSIUM mmol/L 3.7 3.9 4.4   CHLORIDE mmol/L 102 100 100   CO2 mmol/L 26 25 23   BUN mg/dL 18 22 25   CREATININE mg/dL 0.77 0.84 0.96   CALCIUM mg/dL 8.2* 7.8* 8.2*     Results from last 7 days   Lab Units 11/18/24  0526 11/16/24  1719 11/14/24  0053   INR  1.24* 1.07 1.13     Lab Results   Component Value Date    HGBA1C 5.2 11/14/2024     Lab Results   Component Value Date    CKTOTAL 258 09/14/2024    CKMB 2.0 04/22/2021    CKMBINDEX <1.0 04/22/2021    TROPONINI <0.02 06/25/2021       Telemetry: remains in rapid atrial fibrillation      VTE Pharmacologic Prophylaxis: VTE covered by:  apixaban, Oral     VTE Mechanical Prophylaxis: sequential compression device    Abigail TRAN  Cardiology

## 2024-11-19 NOTE — ASSESSMENT & PLAN NOTE
Lab Results   Component Value Date    HGBA1C 5.2 11/14/2024       Recent Labs     11/17/24 2057 11/18/24  1157 11/18/24  1557 11/18/24 2019   POCGLU 105 121 118 129       Blood Sugar Average: Last 72 hrs:  (P) 115.3436652827643099  Continue SSI q6h  Goal BG <180

## 2024-11-19 NOTE — CASE MANAGEMENT
Case Management Assessment & Discharge Planning Note    Patient name Gregg Partida  Location ICU 11ICU 11 MRN [id number]  : 1960 Date 2024       Current Admission Date: 2024  Current Admission Diagnosis:Atrial fibrillation with RVR (HCC)   Patient Active Problem List    Diagnosis Date Noted Date Diagnosed    Acute respiratory insufficiency 2024     Hyponatremia 2024     Closed fracture of one rib of left side 2024     Ambulatory dysfunction 2024     Financial insecurity 2024     Iron deficiency 2024     BPH (benign prostatic hyperplasia) 2024     Chronic alcohol use 2024     Fall, initial encounter 2024     Hypertension 2024     Hemorrhagic cystitis 2024     Alcohol withdrawal (HCC) 2024     Hypothyroidism 2024     Gastrointestinal hemorrhage with melena 10/29/2023     Abnormal thyroid stimulating hormone level 2023     Chest pain 2023     Paroxysmal atrial fibrillation (HCC) 2023     GERD (gastroesophageal reflux disease) 2023     Sleep apnea 2023     Stable angina (HCC) 2022     Stage 3a chronic kidney disease (HCC) 2022     Fatty liver, alcoholic 04/15/2022     Nonischemic nontraumatic myocardial injury 04/10/2022     History of atrial fibrillation 10/25/2021     Mild protein-calorie malnutrition (HCC) 2021     Prostate cancer (HCC) 2021     Encephalopathy acute 2021     Transaminitis 2021     Atrial fibrillation with RVR (HCC) 2020     Chronic heart failure with preserved ejection fraction (HCC) 2019     Noncompliance 2019     Alcohol abuse with withdrawal delirium (HCC) 2019     Alcohol withdrawal syndrome with complication (HCC) 10/17/2019     Electrolyte abnormality 10/17/2019     Cervical spinal stenosis 10/17/2019     Thrombocytopenia (HCC) 2019     History of prostate cancer 2019     CAD (coronary  artery disease) 06/07/2019     Nicotine abuse 06/07/2019     Hypomagnesemia 10/10/2018     Depression, recurrent (HCC) 10/10/2018     Hx of CABG 10/10/2018     Dyslipidemia 10/10/2018     COPD (chronic obstructive pulmonary disease) ([facility]) 10/09/2018     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 disease ([facility]) 10/09/2018     Hypertensive heart and chronic kidney disease with heart failure and stage 1 through stage 4 chronic kidney disease, or chronic kidney disease (HCC) 10/09/2018       LOS (days): 5  Geometric Mean LOS (GMLOS) (days): 3.4  Days to GMLOS:-2     OBJECTIVE:    Risk of Unplanned Readmission Score: 69.63      Current admission status: Inpatient  Referral Reason: Drug/Alcohol Abuse    Preferred Pharmacy:   [facility] [address] [address]  Phone: [phone] Fax: [phone]    [facility] [address] [place] [address] - [address]  [address]  [place] [id number]  Phone: [phone] Fax: [phone]    Primary Care Provider: Lilliana Bliss MD    Primary Insurance: [employer] MC REP  Secondary Insurance:     ASSESSMENT:  Active Health Care Proxies       Gregg Partida  Health Care Representative - Son   Primary Phone: [phone] (Mobile)            Readmission Root Cause  30 Day Readmission: No    Patient Information  Admitted from:: Home  Mental Status: Alert  Assessment information provided by:: Patient  Primary Caregiver: Self  Support Systems: Self  [place] Residence[place]  What city do you live in?: [place], [place]  Home entry access options. Select all that apply.: Elevator  Type of Current Residence: Apartment  Floor Level: 5  Living Arrangements: Lives Alone    Activities of Daily Living Prior to Admission  Functional Status: Independent  Completes ADLs independently?: Yes  Ambulates independently?: Yes  Does patient use assisted devices?: Yes  Assisted Devices (DME) used: Walker  Does  patient currently own DME?: Yes  What DME does the patient currently own?: Walker  Does patient currently have HHC?: No     Patient Information Continued  Income Source: SSI/SSD  Does patient have prescription coverage?: Yes  Does patient receive dialysis treatments?: No  Does patient have a history of substance abuse?: Yes  Historical substance use preference: Alcohol/ETOH  Is patient currently in treatment for substance abuse?: Not appropriate at this time to discuss.     Means of Transportation  Means of Transport to Appts:: [facility]     DISCHARGE DETAILS:    Discharge planning discussed with:: Patient    Contacts  Patient Contacts: Gregg Martínezjann Rdz (son)  Relationship to Patient:: Family  Contact Method: Phone  Phone Number: [phone]  Reason/Outcome: Emergency Contact    Treatment Team Recommendation: Substance Abuse Treatment  Discharge Destination Plan:: Other (TBD pending progress)

## 2024-11-19 NOTE — ASSESSMENT & PLAN NOTE
Admitted on 11/14 with signs/symptoms of alcohol withdrawal. Given phenobarbital load (260mg, 130mg, 130mg) with improved symptoms. Downgraded to the floor  11/15-16: Given multiple doses of Ativan over the last 24 hours per CIWA protocol. Given escalating doses of ativan this morning and therefore transitioned back to SD1 for close monitoring  11/16: Give 130mg of phenobarbitol now for alcohol withdrawal. Precedex gtt at 0.04  11/16-17: required precedex gtt for agitation, increased overnight without improvement. Given additional 65mg of phenobarb at 0330  11/18: 2 additional doses of phenobarb 65mg  Continue thiamine/folic acid   Encourage cessation    Patient's MELD Score is: 9    MELD Score 90-day mortality   <=9 1.9%   10-19 6.0%   20-29 19.6%   30-39 52.6%   >=40 71.3%     MELD Score Component Values:  Component Value Date   Creatinine: 0.84 mg/dL 11/18/2024   Dialysis at least twice   in the past week  Or CVVHD for >=24 hours   in the past week:     No    Bilirubin, total: 0.87 mg/dL 11/18/2024   INR: 1.24 11/18/2024   Sodium: 137 mmol/L 11/18/2024

## 2024-11-19 NOTE — ASSESSMENT & PLAN NOTE
11/15-16: More encephalopathic overnight, likely secondary to alcohol withdrawal. Patient confused, hallucinating. Non-focal. Given multiple doses of ativan per CIWA protocol. Transferred to ICU for repeat phenobarb dosing  Received 130mg of phenobarb, started on precedex  11/17: 65mg of phenobarb  11/18: 65mg phenobarb x2  Remains encephalopathic since admission, oriented to self, year, and place at times.  Goal to wean precedex to off- currently at 1mcg/kg/hr  Consider other etiologies of encephalopathy  Considered CT head, however currently non focal  Infectious workup unremarkable, however concern for possible aspiration this stay   Ammonia 36  Consideration for baseline confusion/agitation given previous hospital notes   Mental status waxing/waning but overall less agitated  Mentation improving and tolerating diet.  Downgraded to med surg    Neuro checks  CIWA protocol

## 2024-11-19 NOTE — PROGRESS NOTES
Progress Note - Critical Care/ICU   Name: Gregg Partida 64 y.o. male I MRN: [id number]  Unit/Bed#: ICU 11 I Date of Admission: 11/14/2024   Date of Service: 11/19/2024 I Hospital Day: 5      Assessment & Plan  Alcohol withdrawal (HCC)  Admitted on 11/14 with signs/symptoms of alcohol withdrawal. Given phenobarbital load (260mg, 130mg, 130mg) with improved symptoms. Downgraded to the floor  11/15-16: Given multiple doses of Ativan over the last 24 hours per CIWA protocol. Given escalating doses of ativan this morning and therefore transitioned back to SD1 for close monitoring  11/16: Give 130mg of phenobarbitol now for alcohol withdrawal. Precedex gtt at 0.04  11/16-17: required precedex gtt for agitation, increased overnight without improvement. Given additional 65mg of phenobarb at 0330  11/18: 2 additional doses of phenobarb 65mg  Continue thiamine/folic acid   Encourage cessation    Patient's MELD Score is: 9    MELD Score 90-day mortality   <=9 1.9%   10-19 6.0%   20-29 19.6%   30-39 52.6%   >=40 71.3%     MELD Score Component Values:  Component Value Date   Creatinine: 0.84 mg/dL 11/18/2024   Dialysis at least twice   in the past week  Or CVVHD for >=24 hours   in the past week:     No    Bilirubin, total: 0.87 mg/dL 11/18/2024   INR: 1.24 11/18/2024   Sodium: 137 mmol/L 11/18/2024      Encephalopathy acute  11/15-16: More encephalopathic overnight, likely secondary to alcohol withdrawal. Patient confused, hallucinating. Non-focal. Given multiple doses of ativan per CIWA protocol. Transferred to ICU for repeat phenobarb dosing  Received 130mg of phenobarb, started on precedex  11/17: 65mg of phenobarb  11/18: 65mg phenobarb x2  Remains encephalopathic since admission, oriented to self, year, and place at times.  Goal to wean precedex to off- currently at 1mcg/kg/hr  Consider other etiologies of encephalopathy  Consider CT head, however currently non focal  Infectious workup unremarkable this far, however  "concern for possible aspiration this stay   Ammonia 36  Consideration for baseline confusion/agitation given previous hospital notes   Mental status waxing/waning but overall less agitated  Close neuro checks  CAM ICU  Atrial fibrillation with RVR (HCC)  Chronic afib on metoprolol and eliquis  Episodes of Afib RVR this stay likely exacerbated in the setting of alcohol withdrawal   Initiated on cardizem gtt by primary team on 11/17 given refractory Afib with RVR despite escalating doses of metoprolol  Trial of IV Metoprolol 11/18 with discontinuation of cardizem drip- Had Afib with RVR which did not improve with BB  Cardizem drip resumed overnight with some improvement in HR  Had reflex tachycardia after PRN dose of hydralazine overnight, would avoid hydralazine moving forward  Continue therapeutic lovenox and transition back to eliquis when safe for PO   Thrombocytopenia (HCC)  Appears chronic likely in the setting of alcohol use   Follow daily- improving   Electrolyte abnormality  Aggressively replete electrolytes  Goal K>4, Mg>2  Acute respiratory insufficiency  Currently on 4L NC   11/17 CXR \"Persistent decreased mild pulmonary edema\"   Consideration for diuretics given large volume resuscitation earlier this stay   Continue nebs  Hold steroids given no wheezing on exam   Pulmonary hygiene  Aspiration precautions  Failed speech eval 11/18 and required frequent NT suctioning, now NPO  COPD (chronic obstructive pulmonary disease) ([facility])  Continue current inhaler/nebulizer regimen  Wean oxygen for goal SpO2>88%  Chronic heart failure with preserved ejection fraction ([facility])  Wt Readings from Last 3 Encounters:   11/19/24 87.4 kg (192 lb 10.9 oz)   09/19/24 86.2 kg (190 lb 1.6 oz)   09/16/24 83.3 kg (183 lb 10.3 oz)     Daily weights  PTA meds: metoprolol 50mg BID, eliquis, ranexa  Continue therapeutic lovenox until tolerating PO consistently   Close I&Os  Cardiac diet when able to tolerate PO       Type 2 diabetes " mellitus with diabetic chronic kidney disease (HCC)  Lab Results   Component Value Date    HGBA1C 5.2 11/14/2024       Recent Labs     11/17/24 2057 11/18/24  1157 11/18/24  1557 11/18/24 2019   POCGLU 105 121 118 129       Blood Sugar Average: Last 72 hrs:  (P) [id number]  Continue SSI q6h  Goal BG <180  Ambulatory dysfunction  PT/OT when appropriate  BPH (benign prostatic hyperplasia)  Continue home Flomax  Urinary retention protocol  Transaminitis  Increasing transaminitis 11/16, unclear etiology. ? Related to alcoholic hepatitis  11/18 RUQ US- Hepatomegaly and hepatic steatosis. Circumferential gallbladder wall thickening most likely related to underlying liver disease and/or hypoproteinemia. Small right hepatic cyst.  LFTs now downtrending again, no abdominal pain on exam  Closed fracture of one rib of left side  Pain control as needed  Hyponatremia  Chronic hyponatremia likely secondary to alcohol use  Sodium 129 11/17, was briefly on 1.8% NSS which was d/c'ed once sodium reached 135  Continue to trend   Close I&O  Disposition: Critical care    ICU Core Measures     A: Assess, Prevent, and Manage Pain Has pain been assessed? Yes  Need for changes to pain regimen? No   B: Both SAT/SAT  N/A   C: Choice of Sedation RASS Goal: 0 Alert and Calm  Need for changes to sedation or analgesia regimen? No   D: Delirium CAM-ICU: Positive   E: Early Mobility  Plan for early mobility? Yes   F: Family Engagement Plan for family engagement today? Yes         Prophylaxis:  VTE VTE covered by:  enoxaparin, Subcutaneous, 90 mg at 11/18/24 2031       Stress Ulcer  covered bypantoprazole (PROTONIX) 40 mg tablet [[id number]] (Long-Term Med), pantoprazole (PROTONIX) EC tablet 40 mg [[id number]]         24 Hour Events : Cardizem drip discontinued yesterday. Was given IV metoprolol and phenobarbital doses for concerns of tachycardia related to EtOH withdrawal. HR remained elevated despite these measures and CIWA remained low.  Was also given a total of 1L IVF's without improvement in HR. He ultimately required re-initiation of cardizem drip which improved HR to <100. He later became hypertensive with SBP sustained >180s. Was given hydralazine 5mg IV x1 then experienced a reflex tachycardia with rates again >120s. Cardizem drip now at 15mg/hr with some improvement in HR. He remains intermittently angry, impulsive and agitated. Precedex infusing at 0.8mg/kg/hr.      Subjective   Review of Systems: See HPI for Review of Systems    Objective :                   Vitals I/O      Most Recent Min/Max in 24hrs   Temp 98.5 °F (36.9 °C) Temp  Min: 97.5 °F (36.4 °C)  Max: 99.6 °F (37.6 °C)   Pulse (!) 135 Pulse  Min: 82  Max: 167   Resp 19 Resp  Min: 18  Max: 34   BP (!) 157/112 BP  Min: 141/107  Max: 196/151   O2 Sat 93 % SpO2  Min: 92 %  Max: 100 %      Intake/Output Summary (Last 24 hours) at 11/19/2024 0617  Last data filed at 11/19/2024 0614  Gross per 24 hour   Intake 436 ml   Output 750 ml   Net -314 ml       Diet NPO; Sips of clear liquids    Invasive Monitoring   N/A        Physical Exam   Physical Exam  Vitals and nursing note reviewed.   Eyes:      General: Lids are normal.   Skin:     General: Skin is warm and dry.   HENT:      Head: Normocephalic and atraumatic.      Mouth/Throat:      Mouth: Mucous membranes are dry.   Cardiovascular:      Rate and Rhythm: Tachycardia present. Rhythm irregular.      Pulses:           Radial pulses are 2+ on the right side and 2+ on the left side.        Dorsalis pedis pulses are 2+ on the right side and 2+ on the left side.      Heart sounds: Normal heart sounds.   Abdominal: General: Bowel sounds are normal.      Palpations: Abdomen is soft.   Constitutional:       General: He is not in acute distress.     Appearance: He is ill-appearing and toxic-appearing.      Interventions: Nasal cannula in place.   Pulmonary:      Effort: Tachypnea present.      Breath sounds: Rhonchi present.      Comments: Has  required NT suctioning overnight for airway clearance  Psychiatric:         Attention and Perception: He is inattentive.         Mood and Affect: Affect is labile and angry.         Behavior: Behavior is uncooperative.         Cognition and Memory: Cognition is impaired.   Neurological:      General: No focal deficit present.      Mental Status: He is alert and easily aroused. He is disoriented to place, disoriented to time and disoriented to situation.      GCS: GCS eye subscore is 4. GCS verbal subscore is 4. GCS motor subscore is 6.      Motor: gross motor function is at baseline for patient. Strength full and intact in all extremities.          Diagnostic Studies        Lab Results: I have reviewed the following results: see below  EKG: Atrial fibrillation on monitor, rate 90-150s  Imaging: US right upper quadrant with liver dopplers  Narrative: RIGHT UPPER QUADRANT ULTRASOUND WITH LIVER DOPPLER    INDICATION: transaminitis.    COMPARISON: Abdominal ultrasound October 26, 2022, CT chest abdomen and pelvis November 14, 2024    TECHNIQUE: Real-time ultrasound of the right upper quadrant was performed with a curvilinear transducer with both volumetric sweeps and still imaging techniques.    FINDINGS:  Doppler evaluation very limited due to patient inability to remain still during the exam.  PANCREAS: Portions of the pancreas are obscured by bowel gas. Visualized portions of the pancreas are unremarkable.    AORTA AND IVC: Visualized portions are normal for patient age.    LIVER:  Size: Severely enlarged. The liver measures 19.3 cm in the midclavicular line.  Contour: Surface contour is smooth.  Parenchyma: There is moderate diffuse increased echogenicity with smooth echotexture and acoustic beam attenuation. Most consistent with moderate hepatic steatosis.  No liver mass identified.    LIVER DOPPLER: Limited as described. The main portal vein and primary branch segments are patent and hepatopetal with normal  spectral waveform. Hepatic veins are patent. Spectral waveforms grossly within normal limits. Main hepatic artery appears normal   size, patent with normal spectral waveform.    BILIARY:  Gallbladder demonstrates marked wall thickening and striations measuring up to 12 mm. There is relative contraction of the gallbladder. No gallstones are seen. Peres sign is negative. Findings most likely reflect underlying liver disease or   hypoproteinemia.  Acute cholecystitis is not considered likely.  No intrahepatic biliary dilatation.  CBD measures 5.0 mm.  No choledocholithiasis.    KIDNEY:  Right kidney measures 11.1 x 5.6 x 5.4 cm. Volume 175.8 mL  No hydronephrosis.  8 mm simple cyst upper pole.    ASCITES: None.  Right pleural effusion is incidentally noted.  Impression: 1. Hepatomegaly and hepatic steatosis.  Doppler evaluation limited but grossly within normal limits.  2. Circumferential gallbladder wall thickening most likely related to underlying liver disease and/or hypoproteinemia.  3. Small right hepatic cyst.  4. Right pleural effusion.    Workstation performed: [id number]         Medications:  Scheduled PRN   enoxaparin, 1 mg/kg, Q12H SEDA  ferrous sulfate, 325 mg, Daily With Breakfast  fluticasone-vilanterol, 1 puff, Daily  folic acid 1 mg, thiamine (VITAMIN B1) 100 mg in sodium chloride 0.9 % 100 mL IV piggyback, , Daily  insulin lispro, 1-5 Units, Q6H SEDA  levalbuterol, 1.25 mg, Q6H  nicotine, 1 patch, Daily  pantoprazole, 40 mg, BID AC  sodium chloride, 4 mL, Q6H  tamsulosin, 0.4 mg, Daily      acetaminophen, 650 mg, Q6H PRN  senna-docusate sodium, 1 tablet, Daily PRN  trimethobenzamide, 200 mg, Q6H PRN       Continuous    dexmedetomidine, 0.1-1.2 mcg/kg/hr, Last Rate: 0.8 mcg/kg/hr (11/19/24 0501)  diltiazem, 1-15 mg/hr, Last Rate: 15 mg/hr (11/19/24 0502)         Labs:   CBC    Recent Labs     11/18/24  0526 11/19/24  0505   WBC 9.09 9.65   HGB 10.5* 10.0*   HCT 32.2* 30.0*   * 130*      BMP    Recent Labs     11/18/24  1950 11/19/24  0505   SODIUM 137 137   K 3.9 3.7    102   CO2 25 26   AGAP 12 9   BUN 22 18   CREATININE 0.84 0.77   CALCIUM 7.8* 8.2*       Coags    Recent Labs     11/18/24  0526   INR 1.24*        Additional Electrolytes  Recent Labs     11/18/24  1950 11/19/24  0505   MG 2.0 1.7*          Blood Gas    No recent results  No recent results LFTs  Recent Labs     11/18/24  0526 11/19/24  0505   ALT 71* 65*   AST 63* 52*   ALKPHOS 157* 137*   ALB 3.1* 3.0*   TBILI 0.87 0.86       Infectious  No recent results    Glucose  Recent Labs     11/17/24  2349 11/18/24  0526 11/18/24  1950 11/19/24  0505   GLUC 100 105 119 121

## 2024-11-19 NOTE — ASSESSMENT & PLAN NOTE
Chronic hyponatremia likely secondary to alcohol use  Sodium 129 11/17, was briefly on 1.8% NSS which was d/c'ed once sodium reached 135  Sodium 137    Continue to trend   Close I&O

## 2024-11-19 NOTE — QUICK NOTE
Progress Note - Critical Care/ICU   Name: Gregg Partida 64 y.o. male I MRN: [id number]  Unit/Bed#: ICU 11 I Date of Admission: 11/14/2024   Date of Service: 11/19/2024 I Hospital Day: 5       Critical Care Interval Transfer Note:    Brief Hospital Summary:  Admitted with ETOH abuse, afib RVR. Went into withdrawal and was transferred to ICU. He was loaded with phenobarb and required precedex. He has been in and out of afib RVR required PRN metoprolol boluses, cardizem bolus and cardizem infusion. Cardizem gtt has been d/c'd as he passed for a diet today. He was resumed on his PO metoprolol and started him on PO cardizem.     Barriers to discharge:   Rate control   PT/OT     Consults: IP CONSULT TO ALCOHOL BRIEF INTERVENTION TRAUMA  IP CONSULT TO CARDIOLOGY      Discharge Plan: Anticipate discharge in 24-48 hrs to unknown location       Patient seen and evaluated by Critical Care today and deemed to be appropriate for transfer to [facility] with Telemetry. Spoke to Dr. Presley from [facility] to accept transfer. Critical care can be contacted via SecureChat with any questions or concerns. Please use the Critical Care AP Role in Secure Chat for any provider inquires until the patient is transferred out of the ICU or until tomorrow at 0600.

## 2024-11-19 NOTE — ASSESSMENT & PLAN NOTE
Chronic, noting hx of several falls in setting of chronic alcohol abuse - hematomas noted diffusely on skin. PT/OT ordered    CT head wo contrast: no acute intracranial abnormality  CT C-spine wo contrast: No acute cervical spine fracture or traumatic malalignment.  CT chest/abd/pelvis wo contrast: mildly displaced left lateral 7th rib fracture, no hemothorax/pneumothorax, no acute trauma in abdomen or pelvis     -PT/OT

## 2024-11-19 NOTE — ASSESSMENT & PLAN NOTE
11/15-16: More encephalopathic overnight, likely secondary to alcohol withdrawal. Patient confused, hallucinating. Non-focal. Given multiple doses of ativan per [facility] protocol. Transferred to ICU for repeat phenobarb dosing  Received 130mg of phenobarb, started on precedex  11/17: 65mg of phenobarb  11/18: 65mg phenobarb x2  Remains encephalopathic since admission, oriented to self, year, and place at times.  Goal to wean precedex to off- currently at 1mcg/kg/hr  Consider other etiologies of encephalopathy  Consider CT head, however currently non focal  Infectious workup unremarkable this far, however concern for possible aspiration this stay   Ammonia 36  Consideration for baseline confusion/agitation given previous hospital notes   Mental status waxing/waning but overall less agitated  Close neuro checks  CAM ICU

## 2024-11-19 NOTE — ASSESSMENT & PLAN NOTE
Plt 96 on admission, similar to baseline. Suspect 2/2 chronic alcohol abuse.  Plt 130 today     Plan:  -Continue home ASA  -Monitor for bleeding  -Continue home eliquis

## 2024-11-19 NOTE — ASSESSMENT & PLAN NOTE
Chronic afib on metoprolol and eliquis  Episodes of Afib RVR this stay likely exacerbated in the setting of alcohol withdrawal   Initiated on cardizem gtt by primary team on 11/17 given refractory Afib with RVR despite escalating doses of metoprolol  Trial of IV Metoprolol 11/18 with discontinuation of cardizem drip- Had Afib with RVR which did not improve with BB  Cardizem drip resumed overnight with some improvement in HR  Had reflex tachycardia after PRN dose of hydralazine overnight, would avoid hydralazine moving forward  Continue therapeutic lovenox and transition back to eliquis when safe for PO

## 2024-11-19 NOTE — ASSESSMENT & PLAN NOTE
"Pt was on 4L NC  in ICU  11/17 CXR \"Persistent decreased mild pulmonary edema\"     Continue nebs  Hold steroids given no wheezing on exam   Pulmonary hygiene  Aspiration precautions  Failed speech eval 11/18 and required frequent NT suctioning, now NPO  11/19 passed speech eval and stared on diet, tolerating well.  "

## 2024-11-19 NOTE — ASSESSMENT & PLAN NOTE
Chronic hyponatremia likely secondary to alcohol use  Sodium 129 11/17, was briefly on 1.8% NSS which was d/c'ed once sodium reached 135  Continue to trend   Close I&O

## 2024-11-19 NOTE — PROGRESS NOTES
Critical Care Attending Note; Victor M Lainez   Note Date: 24  Note Time: 10:01 AM    Patient: Gregg Partida  Age, : 64 y.o., 1960 MRN: [id number] Code Status: Level 1 - Full Code Patient Location: ICU 11/ICU 11   Hospital LOS:5 days  ]   Patient seen and examined, medical record reviewed, discussed with house staff and nursing staff.         HPI   CC: EtOH  64M  with PMH of EtOH abuse, DM, COPD, HFpEF, Afib who presents with EtOH withdrawal.    Main ICU Plans:       #Neuro  EtOH withdrawal - s/p Phenobarbital - Last drink   - Precedex - wean   - CIWA - Phenobarbital PRN  - Thiamine/Folic Acid    #Card  Afib - RVR - Unable to wean Cardizem, likely will need cardioversion  - Cardiology Consulted   - Cardizem gtt -   - Lovenox    #GI  Transaminitis - Improving   - Hep C positive - Hep C RNA    #MSK   Rib Fx - Pain control    #DVT/GI ppx  Lovenox    #Lines/Tubes/Drains:   Invasive Devices       Peripheral Intravenous Line  Duration             Peripheral IV 24 Left Forearm 2 days    Long-Dwell Peripheral IV (Midline) 24 Left Basilic 1 day              Drain  Duration             External Urinary Catheter Small <1 day                    #Nutrition:   Diet NPO; Sips of clear liquids        #Code Status:   Level 1 - Full Code    #Dispo:   ICU        Victor M Lainez MD  Pulmonary, Critical Care    Critical care time, excluding procedures, teaching, family meetings, and excludes any prior time recorded by the AP/resident, 35 minutes. Upon my evaluation, this patient has a high probability of imminent or life-threatening deterioration due to above problems which required my direct attention, intervention, and personal management.   Impression/Active Problems:    EtOH withdrawal   DM   COPD   HFpEF   Afib    Transaminitis      Physical Exam:     Vital Signs:   Weight: 87.4 kg (192 lb 10.9 oz)  IBW: Ideal body weight: 82.2 kg (181 lb 3.5 oz)  Adjusted ideal body weight: 84.3 kg (185 lb  12.9 oz)  Temp:  [97.5 °F (36.4 °C)-99.2 °F (37.3 °C)] 97.7 °F (36.5 °C)  HR:  [] 157  BP: (141-196)/() 167/88  Resp:  [17-31] 17  SpO2:  [90 %-100 %] 90 %  O2 Device: Nasal cannula  Nasal Cannula O2 Flow Rate (L/min):  [4 L/min-5 L/min] 4 L/min  General: NAD  Neuro: AxO 3  Heart: RRR  Lungs: No respiratory  distress  Abdomen: Soft   Extremities: No edema                Ventilator Settings:         Results from last 7 days   Lab Units 11/14/24  0438   PO2 HALIE mm Hg 56.0*     Radiologic Images Reviewed:    CXR   Mild Pulmonary edema    CT Head  No acute intracranial abnormality.     CT Spine   No acute cervical spine fracture or traumatic malalignment.     CT Chest    1. Mildly displaced left lateral seventh rib fracture.   2. No hemothorax or pneumothorax.   3. No acute trauma in the abdomen or pelvis.   Input / Output:     Intake/Output Summary (Last 24 hours) at 11/19/2024 1001  Last data filed at 11/19/2024 0614  Gross per 24 hour   Intake 436 ml   Output 750 ml   Net -314 ml            Infusions:  dexmedetomidine, 0.1-1.2 mcg/kg/hr, Last Rate: 0.7 mcg/kg/hr (11/19/24 0846)  diltiazem, 1-15 mg/hr, Last Rate: 15 mg/hr (11/19/24 0502)      Scheduled Medications:  Current Facility-Administered Medications   Medication Dose Route Frequency Provider Last Rate    acetaminophen  650 mg Oral Q6H PRN CHELSEA Christopher      dexmedetomidine  0.1-1.2 mcg/kg/hr Intravenous Titrated CHELSEA Christopher 0.7 mcg/kg/hr (11/19/24 0846)    diltiazem  1-15 mg/hr Intravenous Titrated CHELSEA Saucedo 15 mg/hr (11/19/24 0502)    enoxaparin  1 mg/kg Subcutaneous Q12H Count includes the [facility] Khushboo Quintero PA-C      ferrous sulfate  325 mg Oral Daily With Breakfast CHELSEA Christopher      fluticasone-vilanterol  1 puff Inhalation Daily CHELSEA Christopher      folic acid 1 mg, thiamine (VITAMIN B1) 100 mg in sodium chloride 0.9 % 100 mL IV piggyback   Intravenous Daily Khushboo Morrow  "TANK Quintero 0 mL/hr at 11/17/24 1330    insulin lispro  1-5 Units Subcutaneous Q6H SEDA CHELSEA Saucedo      labetalol  10 mg Intravenous Q4H PRN CHELSEA Pabon      levalbuterol  1.25 mg Nebulization Q6H Victor M Lainez MD      magnesium sulfate  4 g Intravenous Once MarychuyCHELSEA Kohli 4 g (11/19/24 0758)    nicotine  1 patch Transdermal Daily [facility] Angella, CHELSEA      pantoprazole  40 mg Oral BID AC [place] CHELSEA Nance      potassium chloride  20 mEq Intravenous Once Marychuy MARIANNE MontanaNP 20 mEq (11/19/24 0951)    senna-docusate sodium  1 tablet Oral Daily PRN [place] CHELSEA Nance      sodium chloride  4 mL Nebulization Q6H Marychuy Zuniga, CHELSEA      tamsulosin  0.4 mg Oral Daily [facility] CHELSEA Perales      trimethobenzamide  200 mg Intramuscular Q6H PRN KenyaMARIANNE AlexanderNP         PRN Medications:    acetaminophen    labetalol    senna-docusate sodium    trimethobenzamide    Labs Reviewed:  Results from last 7 days   Lab Units 11/19/24  0505 11/18/24  0526 11/17/24  0526   WBC Thousand/uL 9.65 9.09 8.67   HEMOGLOBIN g/dL 10.0* 10.5* 11.2*   HEMATOCRIT % 30.0* 32.2* 33.4*   PLATELETS Thousands/uL 130* 117* 89*      Results from last 7 days   Lab Units 11/19/24  0505 11/18/24  1950 11/18/24  0526   SODIUM mmol/L 137 137 136   CO2 mmol/L 26 25 23   BUN mg/dL 18 22 25   CALCIUM mg/dL 8.2* 7.8* 8.2*   MAGNESIUM mg/dL 1.7* 2.0 1.5*         Invalid input(s): \"ASTSGOT\", \"ALTSGPT\"LABRCNTIP@ ,alkphos:3,tbilirubin:3,dbilirubin:3)@  Results from last 7 days   Lab Units 11/18/24  0526 11/16/24  1719 11/14/24  0053   INR  1.24* 1.07 1.13           Invalid input(s): \"TROPT\", \"PBNP\"             I have personally seen and examined the patient on (11/19/24 between 8838-8706). I discussed the patient with the AP/resident including, but not limited to, verifying findings; reviewing labs and x-rays; discussing with consultants; developing " the plan of care with the bedside nurse; and discussing treatment plan with patient or surrogate.  I have reviewed the note and assessment performed by the AP/resident and agree with the AP/resident’s documented findings and plan of care with the above additions/exceptions. Please see my comments for details and adjustments.

## 2024-11-19 NOTE — ASSESSMENT & PLAN NOTE
CT C/A/P: Mildly displaced left lateral seventh rib fracture.     -Pain control with PRN tylenol  -incentive spirometry

## 2024-11-19 NOTE — ASSESSMENT & PLAN NOTE
Increasing transaminitis 11/16, most likely related to alcoholic hepatitis  11/18 RUQ US- Hepatomegaly and hepatic steatosis. Circumferential gallbladder wall thickening most likely related to underlying liver disease and/or hypoproteinemia. Small right hepatic cyst.  LFTs now downtrending again, no abdominal pain on exam    -trend LFTs

## 2024-11-19 NOTE — ASSESSMENT & PLAN NOTE
Wt Readings from Last 3 Encounters:   11/19/24 87.4 kg (192 lb 10.9 oz)   09/19/24 86.2 kg (190 lb 1.6 oz)   09/16/24 83.3 kg (183 lb 10.3 oz)   8/16/2024 TTE: EF visibly estimated 65% with severe left atrial dilatation  BNP 1150  patient received Lasix 40 mg IV times one with good response on 11/17/2024  continue Lopressor 50 mg Q 12 hours  monitor I and O, daily weights and labs  CHF education

## 2024-11-19 NOTE — ASSESSMENT & PLAN NOTE
Na 130, Cl 89, bicarb 20, BUN 37, Ca 7.6 POA in setting of chronic alcohol abuse and starvation.     Replete as needed

## 2024-11-19 NOTE — ASSESSMENT & PLAN NOTE
Presenting with acute intoxication and noting 4 day binge of significant alcohol use. Hx of several admissions for similar complaints in the past. Unable to assess compliance with home naltrexone     Ethanol on admission: 381  Stopped IVF since Pt started to tolerate diet   Multiple doses of phenobarbital in ICU    Plan:  -CIWA protocol   -Seizure precautions, fall precautions, aspiration precautions  -Tigan PRN for nausea   -continue thiamine, folate

## 2024-11-19 NOTE — ASSESSMENT & PLAN NOTE
Cr 1.60 on admission, baseline 1 -1.2. Suspect secondary to dehydration, starvation acidosis, significant alcohol abuse.    UA: neg    ENMA resolved, Cr. 0.77 after IVF    Plan:  -Urinary retention protocol   -Avoid hypotension   -Daily weights, strict I&O's  -Avoid nephrotoxins as able

## 2024-11-19 NOTE — SPEECH THERAPY NOTE
"Speech/Language Pathology Progress Note    Patient Name: Gregg Partida  Today's Date: 11/19/2024     Problem List  Principal Problem:    Atrial fibrillation with RVR (HCC)  Active Problems:    COPD (chronic obstructive pulmonary disease) (HCC)    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 disease (HCC)    Thrombocytopenia (HCC)    Electrolyte abnormality    Chronic heart failure with preserved ejection fraction (HCC)    Encephalopathy acute    Transaminitis    Alcohol withdrawal (HCC)    Ambulatory dysfunction    BPH (benign prostatic hyperplasia)    Closed fracture of one rib of left side    Hyponatremia    Acute respiratory insufficiency      Subjective:  \"Im hungry.\"    Objective:  Pt was seen for diagnostic dysphagia therapy to assess readiness for oral diet. Pt was alert, positioned upright but fed himself rapidly/benefited from supervision to slow rate of oral intake.    Pt was assessed with nectar thicka nd thin clear liquid, diet jello, small amt of applesauce and cookies . Mastication was timely and effective.  Bolus formation and transfer were effective.  Swallow initiation appeared prompt. Laryngeal rise was good by palpation. No coughing, throat clearing, c/o stasis, multiple swallows, change in vocal quality noted c po intake today.     Assessment:  Pt presented with impulsive self feeding but no significant s/s oral/pharyngeal dysphagia.    Plan/Recommendations:  Consider initiation of diet (regular textrued food, thin liquid and supervision with meals for 24 hrs).   Continue briefl SLP f/u to ensure diet tolerance overtime/larger sample size.     Balbina Bautista MS [facility]-SLP  [place] License [id number]       "

## 2024-11-19 NOTE — ASSESSMENT & PLAN NOTE
Presented to ED with afib RVR, HR > 140 sustained. EKG afib RVR with 's. Patient noting he has not been compliant with home eliquis or lopressor 50 mg BID.    In ED: s/p lopressor 5 mg x 2, cardizem 15 mg x 1  11/15 OVN: Cardizem x2 Lopressor x1    Plan:  - Cardiology consulted, appreciate recs  - Pt was on Cardizem gtt  - Pt refused cardioversion.   - trial of adding cardizem 30mg Q6hrs and stopping Cardizem gtt was initiated, then increased to 90mg Q6hrs due to having RVR again.  - s/p digoxin x 2, rate started to come down    - Pt was put back on titratable cadizem gtt   - was on Lovenox therapeutic dosage due to failing speech eval earlier  - restarted home Eliquis 5 mg BID after passing speech eval  - will increase home lopressor 75mg BID to help him get off the Cardizem drip.   - Continue to monitor for rate control and BP  - telemetry

## 2024-11-19 NOTE — ASSESSMENT & PLAN NOTE
Wt Readings from Last 3 Encounters:   11/19/24 87.4 kg (192 lb 10.9 oz)   09/19/24 86.2 kg (190 lb 1.6 oz)   09/16/24 83.3 kg (183 lb 10.3 oz)     Daily weights  PTA meds: metoprolol 50mg BID, eliquis, ranexa  Continue therapeutic lovenox until tolerating PO consistently   Close I&Os  Cardiac diet when able to tolerate PO

## 2024-11-19 NOTE — ASSESSMENT & PLAN NOTE
Chronic, stable, presenting with cough that patient reports is at baseline - noting he is non compliant with home inhalers  Patient received 2 doses of p.o. prednisone 40 mg    Plan:  -Resume home Breo  -Xopenex q6hr scheduled

## 2024-11-19 NOTE — ASSESSMENT & PLAN NOTE
"Currently on 4L NC   11/17 CXR \"Persistent decreased mild pulmonary edema\"   Consideration for diuretics given large volume resuscitation earlier this stay   Continue nebs  Hold steroids given no wheezing on exam   Pulmonary hygiene  Aspiration precautions  Failed speech eval 11/18 and required frequent NT suctioning, now NPO  "

## 2024-11-19 NOTE — ASSESSMENT & PLAN NOTE
Increasing transaminitis 11/16, unclear etiology. ? Related to alcoholic hepatitis  11/18 RUQ US- Hepatomegaly and hepatic steatosis. Circumferential gallbladder wall thickening most likely related to underlying liver disease and/or hypoproteinemia. Small right hepatic cyst.  LFTs now downtrending again, no abdominal pain on exam

## 2024-11-19 NOTE — ASSESSMENT & PLAN NOTE
Lab Results   Component Value Date    HGBA1C 5.2 11/14/2024       Recent Labs     11/17/24 2057 11/18/24  1157 11/18/24  1557 11/18/24 2019   POCGLU 105 121 118 129       Blood Sugar Average: Last 72 hrs:  (P) 115.1961415308978460  managed per primary team

## 2024-11-19 NOTE — ASSESSMENT & PLAN NOTE
Chronic, appears hypovolemic on admission - no evidence of acute exacerbation. Home medications include lopressor 50 mg BID and ranexa 500mg BID    BNP 1150 POA  S/P Lasix 20mg IV    Plan:  Cardiology consulted   Daily weights, I&O's  Continue home Lopressor 50mg BID and Ranexa 500mg BID

## 2024-11-20 PROBLEM — E87.1 HYPONATREMIA: Status: RESOLVED | Noted: 2024-11-16 | Resolved: 2024-11-20

## 2024-11-20 LAB
ALBUMIN SERPL BCG-MCNC: 2.8 G/DL (ref 3.5–5)
ALP SERPL-CCNC: 104 U/L (ref 34–104)
ALT SERPL W P-5'-P-CCNC: 55 U/L (ref 7–52)
ANION GAP SERPL CALCULATED.3IONS-SCNC: 6 MMOL/L (ref 4–13)
AST SERPL W P-5'-P-CCNC: 42 U/L (ref 13–39)
BASOPHILS # BLD AUTO: 0.04 THOUSANDS/ÂΜL (ref 0–0.1)
BASOPHILS NFR BLD AUTO: 1 % (ref 0–1)
BILIRUB SERPL-MCNC: 0.7 MG/DL (ref 0.2–1)
BUN SERPL-MCNC: 12 MG/DL (ref 5–25)
CALCIUM ALBUM COR SERPL-MCNC: 8.8 MG/DL (ref 8.3–10.1)
CALCIUM SERPL-MCNC: 7.8 MG/DL (ref 8.4–10.2)
CHLORIDE SERPL-SCNC: 95 MMOL/L (ref 96–108)
CO2 SERPL-SCNC: 29 MMOL/L (ref 21–32)
CREAT SERPL-MCNC: 0.74 MG/DL (ref 0.6–1.3)
EOSINOPHIL # BLD AUTO: 0.11 THOUSAND/ÂΜL (ref 0–0.61)
EOSINOPHIL NFR BLD AUTO: 2 % (ref 0–6)
ERYTHROCYTE [DISTWIDTH] IN BLOOD BY AUTOMATED COUNT: 16.1 % (ref 11.6–15.1)
GFR SERPL CREATININE-BSD FRML MDRD: 98 ML/MIN/1.73SQ M
GLUCOSE SERPL-MCNC: 112 MG/DL (ref 65–140)
GLUCOSE SERPL-MCNC: 127 MG/DL (ref 65–140)
GLUCOSE SERPL-MCNC: 141 MG/DL (ref 65–140)
GLUCOSE SERPL-MCNC: 98 MG/DL (ref 65–140)
GLUCOSE SERPL-MCNC: 99 MG/DL (ref 65–140)
HCT VFR BLD AUTO: 28 % (ref 36.5–49.3)
HGB BLD-MCNC: 9.3 G/DL (ref 12–17)
IMM GRANULOCYTES # BLD AUTO: 0.04 THOUSAND/UL (ref 0–0.2)
IMM GRANULOCYTES NFR BLD AUTO: 1 % (ref 0–2)
LYMPHOCYTES # BLD AUTO: 0.91 THOUSANDS/ÂΜL (ref 0.6–4.47)
LYMPHOCYTES NFR BLD AUTO: 13 % (ref 14–44)
MAGNESIUM SERPL-MCNC: 1.5 MG/DL (ref 1.9–2.7)
MCH RBC QN AUTO: 33 PG (ref 26.8–34.3)
MCHC RBC AUTO-ENTMCNC: 33.2 G/DL (ref 31.4–37.4)
MCV RBC AUTO: 99 FL (ref 82–98)
MONOCYTES # BLD AUTO: 1.29 THOUSAND/ÂΜL (ref 0.17–1.22)
MONOCYTES NFR BLD AUTO: 18 % (ref 4–12)
NEUTROPHILS # BLD AUTO: 4.68 THOUSANDS/ÂΜL (ref 1.85–7.62)
NEUTS SEG NFR BLD AUTO: 65 % (ref 43–75)
NRBC BLD AUTO-RTO: 0 /100 WBCS
PHOSPHATE SERPL-MCNC: 2.4 MG/DL (ref 2.3–4.1)
PLATELET # BLD AUTO: 154 THOUSANDS/UL (ref 149–390)
PMV BLD AUTO: 10.8 FL (ref 8.9–12.7)
POTASSIUM SERPL-SCNC: 3.7 MMOL/L (ref 3.5–5.3)
PROT SERPL-MCNC: 5.6 G/DL (ref 6.4–8.4)
RBC # BLD AUTO: 2.82 MILLION/UL (ref 3.88–5.62)
SODIUM SERPL-SCNC: 130 MMOL/L (ref 135–147)
WBC # BLD AUTO: 7.07 THOUSAND/UL (ref 4.31–10.16)

## 2024-11-20 PROCEDURE — 94664 DEMO&/EVAL PT USE INHALER: CPT

## 2024-11-20 PROCEDURE — 99233 SBSQ HOSP IP/OBS HIGH 50: CPT | Performed by: INTERNAL MEDICINE

## 2024-11-20 PROCEDURE — 83735 ASSAY OF MAGNESIUM: CPT

## 2024-11-20 PROCEDURE — 94640 AIRWAY INHALATION TREATMENT: CPT

## 2024-11-20 PROCEDURE — 99232 SBSQ HOSP IP/OBS MODERATE 35: CPT | Performed by: INTERNAL MEDICINE

## 2024-11-20 PROCEDURE — 94760 N-INVAS EAR/PLS OXIMETRY 1: CPT

## 2024-11-20 PROCEDURE — 82948 REAGENT STRIP/BLOOD GLUCOSE: CPT

## 2024-11-20 PROCEDURE — 80053 COMPREHEN METABOLIC PANEL: CPT

## 2024-11-20 PROCEDURE — 85025 COMPLETE CBC W/AUTO DIFF WBC: CPT

## 2024-11-20 PROCEDURE — 84100 ASSAY OF PHOSPHORUS: CPT

## 2024-11-20 PROCEDURE — 92526 ORAL FUNCTION THERAPY: CPT

## 2024-11-20 PROCEDURE — 94668 MNPJ CHEST WALL SBSQ: CPT

## 2024-11-20 RX ORDER — MAGNESIUM SULFATE HEPTAHYDRATE 40 MG/ML
2 INJECTION, SOLUTION INTRAVENOUS ONCE
Status: COMPLETED | OUTPATIENT
Start: 2024-11-20 | End: 2024-11-20

## 2024-11-20 RX ORDER — FUROSEMIDE 10 MG/ML
40 INJECTION INTRAMUSCULAR; INTRAVENOUS ONCE
Status: COMPLETED | OUTPATIENT
Start: 2024-11-20 | End: 2024-11-20

## 2024-11-20 RX ORDER — METOPROLOL TARTRATE 25 MG/1
25 TABLET, FILM COATED ORAL ONCE
Status: COMPLETED | OUTPATIENT
Start: 2024-11-20 | End: 2024-11-20

## 2024-11-20 RX ORDER — DIGOXIN 0.25 MG/ML
125 INJECTION INTRAMUSCULAR; INTRAVENOUS EVERY 8 HOURS
Status: COMPLETED | OUTPATIENT
Start: 2024-11-20 | End: 2024-11-20

## 2024-11-20 RX ORDER — POTASSIUM CHLORIDE 1500 MG/1
40 TABLET, EXTENDED RELEASE ORAL ONCE
Status: COMPLETED | OUTPATIENT
Start: 2024-11-20 | End: 2024-11-20

## 2024-11-20 RX ORDER — SODIUM CHLORIDE 9 MG/ML
75 INJECTION, SOLUTION INTRAVENOUS CONTINUOUS
Status: DISCONTINUED | OUTPATIENT
Start: 2024-11-20 | End: 2024-11-20

## 2024-11-20 RX ORDER — METOPROLOL TARTRATE 100 MG/1
100 TABLET ORAL EVERY 12 HOURS
Status: DISCONTINUED | OUTPATIENT
Start: 2024-11-20 | End: 2024-11-21

## 2024-11-20 RX ORDER — LANOLIN ALCOHOL/MO/W.PET/CERES
400 CREAM (GRAM) TOPICAL 2 TIMES DAILY
Status: DISCONTINUED | OUTPATIENT
Start: 2024-11-20 | End: 2024-11-21

## 2024-11-20 RX ADMIN — Medication 6 MG: at 22:35

## 2024-11-20 RX ADMIN — PANTOPRAZOLE SODIUM 40 MG: 40 TABLET, DELAYED RELEASE ORAL at 10:23

## 2024-11-20 RX ADMIN — PANTOPRAZOLE SODIUM 40 MG: 40 TABLET, DELAYED RELEASE ORAL at 16:00

## 2024-11-20 RX ADMIN — FERROUS SULFATE TAB 325 MG (65 MG ELEMENTAL FE) 325 MG: 325 (65 FE) TAB at 10:23

## 2024-11-20 RX ADMIN — LABETALOL HYDROCHLORIDE 10 MG: 5 INJECTION, SOLUTION INTRAVENOUS at 22:21

## 2024-11-20 RX ADMIN — Medication 4 ML: at 01:37

## 2024-11-20 RX ADMIN — ASPIRIN 81 MG CHEWABLE TABLET 81 MG: 81 TABLET CHEWABLE at 10:17

## 2024-11-20 RX ADMIN — POTASSIUM CHLORIDE 40 MEQ: 1500 TABLET, EXTENDED RELEASE ORAL at 10:17

## 2024-11-20 RX ADMIN — RANOLAZINE 500 MG: 500 TABLET, FILM COATED, EXTENDED RELEASE ORAL at 22:21

## 2024-11-20 RX ADMIN — Medication 4 ML: at 19:23

## 2024-11-20 RX ADMIN — METOPROLOL TARTRATE 100 MG: 100 TABLET, FILM COATED ORAL at 22:21

## 2024-11-20 RX ADMIN — LEVALBUTEROL HYDROCHLORIDE 1.25 MG: 1.25 SOLUTION RESPIRATORY (INHALATION) at 19:23

## 2024-11-20 RX ADMIN — Medication 400 MG: at 17:12

## 2024-11-20 RX ADMIN — METOPROLOL TARTRATE 75 MG: 50 TABLET, FILM COATED ORAL at 10:18

## 2024-11-20 RX ADMIN — GABAPENTIN 300 MG: 300 CAPSULE ORAL at 10:17

## 2024-11-20 RX ADMIN — APIXABAN 5 MG: 5 TABLET, FILM COATED ORAL at 10:18

## 2024-11-20 RX ADMIN — APIXABAN 5 MG: 5 TABLET, FILM COATED ORAL at 16:00

## 2024-11-20 RX ADMIN — TAMSULOSIN HYDROCHLORIDE 0.4 MG: 0.4 CAPSULE ORAL at 10:19

## 2024-11-20 RX ADMIN — LEVALBUTEROL HYDROCHLORIDE 1.25 MG: 1.25 SOLUTION RESPIRATORY (INHALATION) at 01:37

## 2024-11-20 RX ADMIN — Medication 6 MG: at 04:09

## 2024-11-20 RX ADMIN — DILTIAZEM HYDROCHLORIDE 12.5 MG/HR: 5 INJECTION, SOLUTION INTRAVENOUS at 09:22

## 2024-11-20 RX ADMIN — FOLIC ACID 1 MG: 1 TABLET ORAL at 10:17

## 2024-11-20 RX ADMIN — MAGNESIUM SULFATE HEPTAHYDRATE 2 G: 40 INJECTION, SOLUTION INTRAVENOUS at 09:20

## 2024-11-20 RX ADMIN — DILTIAZEM HYDROCHLORIDE 12.5 MG/HR: 5 INJECTION, SOLUTION INTRAVENOUS at 03:43

## 2024-11-20 RX ADMIN — RANOLAZINE 500 MG: 500 TABLET, FILM COATED, EXTENDED RELEASE ORAL at 10:18

## 2024-11-20 RX ADMIN — METOPROLOL TARTRATE 25 MG: 25 TABLET, FILM COATED ORAL at 11:30

## 2024-11-20 RX ADMIN — FLUTICASONE FUROATE AND VILANTEROL TRIFENATATE 1 PUFF: 200; 25 POWDER RESPIRATORY (INHALATION) at 10:21

## 2024-11-20 RX ADMIN — THIAMINE HCL TAB 100 MG 100 MG: 100 TAB at 10:17

## 2024-11-20 RX ADMIN — FUROSEMIDE 40 MG: 10 INJECTION, SOLUTION INTRAMUSCULAR; INTRAVENOUS at 17:12

## 2024-11-20 RX ADMIN — DIGOXIN 125 MCG: 0.25 INJECTION INTRAMUSCULAR; INTRAVENOUS at 13:02

## 2024-11-20 RX ADMIN — DIGOXIN 125 MCG: 0.25 INJECTION INTRAMUSCULAR; INTRAVENOUS at 19:40

## 2024-11-20 NOTE — PHYSICAL THERAPY NOTE
PT cancellation       11/20/24 1030   PT Last Visit   PT Visit Date 11/20/24   Note Type   Note Type Cancelled Session   Cancel Reasons Refusal  (Pt refused any and all activity with PT today.  Pt reports that he is tired and just wants to sleep.  Will follow.)   Licensure   [place] License Number  Cassie Trujillojackie PT  [id number]

## 2024-11-20 NOTE — SPEECH THERAPY NOTE
SLP Progress Note    Patient Name: Gregg Partida  Today's Date: 11/20/2024     Problem List  Principal Problem:    Atrial fibrillation with RVR (HCC)  Active Problems:    COPD (chronic obstructive pulmonary disease) (HCC)    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 disease (HCC)    Thrombocytopenia (HCC)    Electrolyte abnormality    Chronic heart failure with preserved ejection fraction (HCC)    Encephalopathy acute    Transaminitis    Alcohol withdrawal (HCC)    Ambulatory dysfunction    BPH (benign prostatic hyperplasia)    Closed fracture of one rib of left side    Acute respiratory insufficiency      Subjective:  Pt fully alert and able to feed self.     Objective:  Pt was seen for diagnostic dysphagia therapy to ensure tolerance of regular textured food and thin liquid over time.  Pt was alert and positioned upright. +On CIWA protocol. Comprehended simple questions/commands.  Limited content in attempted conversation at this time.    Pt was assessed with hamburger, french fries, lisa slaw and juice.  Mastication was timely and effective.  Bolus formation and transfer were effective.  Swallow initiation appeared prompt. Laryngeal rise was good by palpation. No coughing, throat clearing, c/o stasis, multiple swallows, change in vocal quality noted c po intake today.     Plan/Recommendations:  No s/s oral/pharyngeal dysphagia;  continue with regular textured food, thin liquid  No additional SLP dysphagia tx indicated at this time.    Balbina Bautista MS [facility]-SLP  [place] License [id number]  [place] License [id number]

## 2024-11-20 NOTE — ASSESSMENT & PLAN NOTE
Presented to ED with afib RVR, HR > 140 sustained. EKG afib RVR with 's. Patient noting he has not been compliant with home eliquis or lopressor 50 mg BID.    In ED: s/p lopressor 5 mg x 2, cardizem 15 mg x 1  11/15 OVN: Cardizem x2 Lopressor x1    Plan:  - Pt was on Cardizem gtt  - Pt refuses cardioversion.   - trial of adding cardizem 30mg Q6hrs and stopping Cardizem gtt was initiated, then increased to 90mg Q6hrs due to having RVR again.  - s/p digoxin x 2, rate started to come down    - Cardiology consulted, appreciate recs  - Titratable cadizem gtt stopped and started on IV Digoxin loading 125 mcg Q8hrs. HR improved yesterday.  - Pt started to have RVR again overnight, but still asymptomatic  - Continue home Eliquis 5 mg BID   - increased home lopressor 100mg BID  - Continue to monitor for rate control and BP  - telemetry

## 2024-11-20 NOTE — ASSESSMENT & PLAN NOTE
Chronic, appears hypovolemic on admission - no evidence of acute exacerbation. Home medications include lopressor 50 mg BID and ranexa 500mg BID    BNP 1150 POA  S/P Lasix 20mg IV    Plan:  Cardiology consulted, given IV lasix 40 mg yesterday  Daily weights, I&O's  increased home Lopressor to 100mg BID and continue home Ranexa 500mg BID

## 2024-11-20 NOTE — ASSESSMENT & PLAN NOTE
patient with history of recurrent atrial fibrillation  8/16/2024 TTE: EF visibly estimated 65% with severe dilatation of left atrium  unclear last time patient took his medications secondary to intoxication  telemetry demonstrates rapid atrial fibrillation rates 130s to 160s  patient started on Cardizem drip 11/15/2024  rates improved with Cardizem drip at 10 mg per hour, will continue drip until patient is more alert and oriented  continue monitor telemetry  discuss with patient possible cardioversion, he differs and declines procedure at this time.  Patient is been on maximum dose of Cardizem without improvement of heart rate. Doubtful patient will successfully cardioversion due to the severe dilatation of his left atrium.  Overnight 11/19/2024 patient received two doses of IV digoxin 0.25 mg which seem to improve heart rate. Will continue digoxin load with digoxin 0.125 mg Q8 hours times two more doses  discontinue IV Cardizem  continue Lopressor increased dose to 100 mg BID  Continue Eliquis 5 mg bid  Will follow rate control strategy

## 2024-11-20 NOTE — ASSESSMENT & PLAN NOTE
Wt Readings from Last 3 Encounters:   11/19/24 87.4 kg (192 lb 10.9 oz)   09/19/24 86.2 kg (190 lb 1.6 oz)   09/16/24 83.3 kg (183 lb 10.3 oz)   8/16/2024 TTE: EF visibly estimated 65% with severe left atrial dilatation  BNP 1150  patient received Lasix 40 mg IV times one with good response on 11/17/2024  continue Lopressor 100 mg BID  monitor I and O, daily weights and labs  CHF education

## 2024-11-20 NOTE — ASSESSMENT & PLAN NOTE
Lab Results   Component Value Date    HGBA1C 5.2 11/14/2024       Recent Labs     11/19/24  1551 11/19/24 2010 11/20/24  0703 11/20/24  1102   POCGLU 105 129 99 112       Blood Sugar Average: Last 72 hrs:  (P) [id number]  managed per primary team

## 2024-11-20 NOTE — PROGRESS NOTES
Progress Note - Cardiology   Name: Gregg Partida 64 y.o. male I MRN: [id number]  Unit/Bed#: ICU 11 I Date of Admission: 11/14/2024   Date of Service: 11/20/2024 I Hospital Day: 6    Assessment & Plan  Atrial fibrillation with RVR ([facility])  patient with history of recurrent atrial fibrillation  8/16/2024 TTE: EF visibly estimated 65% with severe dilatation of left atrium  unclear last time patient took his medications secondary to intoxication  telemetry demonstrates rapid atrial fibrillation rates 130s to 160s  patient started on Cardizem drip 11/15/2024  rates improved with Cardizem drip at 10 mg per hour, will continue drip until patient is more alert and oriented  continue monitor telemetry  discuss with patient possible cardioversion, he differs and declines procedure at this time.  Patient is been on maximum dose of Cardizem without improvement of heart rate. Doubtful patient will successfully cardioversion due to the severe dilatation of his left atrium.  Overnight 11/19/2024 patient received two doses of IV digoxin 0.25 mg which seem to improve heart rate. Will continue digoxin load with digoxin 0.125 mg Q8 hours times two more doses  discontinue IV Cardizem  continue Lopressor increased dose to 100 mg BID  Continue Eliquis 5 mg bid  Will follow rate control strategy  Alcohol withdrawal ([facility])  alcohol on admission was 381  CIWA protocol ongoing  Chronic heart failure with preserved ejection fraction ([facility])  Wt Readings from Last 3 Encounters:   11/19/24 87.4 kg (192 lb 10.9 oz)   09/19/24 86.2 kg (190 lb 1.6 oz)   09/16/24 83.3 kg (183 lb 10.3 oz)   8/16/2024 TTE: EF visibly estimated 65% with severe left atrial dilatation  BNP 1150  patient received Lasix 40 mg IV times one with good response on 11/17/2024  continue Lopressor 100 mg BID  monitor I and O, daily weights and labs  CHF education          COPD (chronic obstructive pulmonary disease) ([facility])  continue current inhaler regimen per primary team  Type 2  diabetes mellitus with diabetic chronic kidney disease (HCC)  Lab Results   Component Value Date    HGBA1C 5.2 11/14/2024       Recent Labs     11/19/24  1551 11/19/24 2010 11/20/24  0703 11/20/24  1102   POCGLU 105 129 99 112       Blood Sugar Average: Last 72 hrs:  (P) 112.9548843178918893  managed per primary team    Subjective   Chief Complaint: no specific cardiac complaints. Heart rates still not well controlled. Did appear to have improvement last evening after receiving digoxin. Will complete digoxin load and discontinue Cardizem. Beta-blocker increased to 100 mg BID      Objective :  Temp:  [96.6 °F (35.9 °C)-98.7 °F (37.1 °C)] 98.7 °F (37.1 °C)  HR:  [] 79  BP: (105-171)/() 171/79  Resp:  [14-41] 23  SpO2:  [88 %-99 %] 96 %  O2 Device: Nasal cannula  Nasal Cannula O2 Flow Rate (L/min):  [2 L/min-7 L/min] 2 L/min  Orthostatic Blood Pressures      Flowsheet Row Most Recent Value   Blood Pressure 171/79 filed at 11/20/2024 1130   Patient Position - Orthostatic VS Lying filed at 11/20/2024 1018          First Weight: Weight - Scale: 84.3 kg (185 lb 13.6 oz) (11/14/24 0900)  Vitals:    11/18/24 0551 11/19/24 0500   Weight: 87.6 kg (193 lb 2 oz) 87.4 kg (192 lb 10.9 oz)     Physical Exam  Vitals and nursing note reviewed.   Constitutional:       Appearance: Normal appearance. He is obese. He is ill-appearing (Chronically).   HENT:      Right Ear: External ear normal.      Left Ear: External ear normal.   Eyes:      General: No scleral icterus.        Right eye: No discharge.         Left eye: No discharge.   Cardiovascular:      Rate and Rhythm: Tachycardia present. Rhythm irregular.      Pulses: Normal pulses.   Pulmonary:      Effort: Pulmonary effort is normal.      Breath sounds: Examination of the right-lower field reveals decreased breath sounds. Examination of the left-lower field reveals decreased breath sounds. Decreased breath sounds present.   Abdominal:      General: Bowel sounds are  normal. There is no distension.      Palpations: Abdomen is soft.   Skin:     General: Skin is warm and dry.      Capillary Refill: Capillary refill takes less than 2 seconds.   Neurological:      General: No focal deficit present.      Mental Status: He is alert. Mental status is at baseline.   Psychiatric:         Mood and Affect: Mood normal.           Lab Results: I have reviewed the following results:  Results from last 7 days   Lab Units 11/20/24  0622 11/19/24  0505 11/18/24  0526   WBC Thousand/uL 7.07 9.65 9.09   HEMOGLOBIN g/dL 9.3* 10.0* 10.5*   HEMATOCRIT % 28.0* 30.0* 32.2*   PLATELETS Thousands/uL 154 130* 117*     Results from last 7 days   Lab Units 11/20/24  0622 11/19/24  0505 11/18/24  1950   POTASSIUM mmol/L 3.7 3.7 3.9   CHLORIDE mmol/L 95* 102 100   CO2 mmol/L 29 26 25   BUN mg/dL 12 18 22   CREATININE mg/dL 0.74 0.77 0.84   CALCIUM mg/dL 7.8* 8.2* 7.8*     Results from last 7 days   Lab Units 11/18/24  0526 11/16/24  1719 11/14/24  0053   INR  1.24* 1.07 1.13     Lab Results   Component Value Date    HGBA1C 5.2 11/14/2024     Lab Results   Component Value Date    CKTOTAL 258 09/14/2024    CKMB 2.0 04/22/2021    CKMBINDEX <1.0 04/22/2021    TROPONINI <0.02 06/25/2021       EKG: remains in atrial flutter with rates 150s or greater      VTE Pharmacologic Prophylaxis: VTE covered by:  apixaban, Oral, 5 mg at 11/20/24 1018     VTE Mechanical Prophylaxis: sequential compression device    Abigail TRAN  Cardiology

## 2024-11-20 NOTE — PROGRESS NOTES
Progress Note - Family Medicine   Name: Gregg Partida 64 y.o. male I MRN: [id number]  Unit/Bed#: ICU 11 I Date of Admission: 11/14/2024   Date of Service: 11/20/2024 I Hospital Day: 6     Assessment & Plan  Atrial fibrillation with RVR (HCC)  Presented to ED with afib RVR, HR > 140 sustained. EKG afib RVR with 's. Patient noting he has not been compliant with home eliquis or lopressor 50 mg BID.    In ED: s/p lopressor 5 mg x 2, cardizem 15 mg x 1  11/15 OVN: Cardizem x2 Lopressor x1    Plan:  - Cardiology consulted, appreciate recs  - Pt was on Cardizem gtt  - Pt refused cardioversion.   - trial of adding cardizem 30mg Q6hrs and stopping Cardizem gtt was initiated, then increased to 90mg Q6hrs due to having RVR again.  - s/p digoxin x 2, rate started to come down    - Pt was put back on titratable cadizem gtt   - was on Lovenox therapeutic dosage due to failing speech eval earlier  - restarted home Eliquis 5 mg BID after passing speech eval  - will increase home lopressor 75mg BID to help him get off the Cardizem drip.   - Continue to monitor for rate control and BP  - telemetry   COPD (chronic obstructive pulmonary disease) (HCC)  Chronic, stable, presenting with cough that patient reports is at baseline - noting he is non compliant with home inhalers  Patient received 2 doses of p.o. prednisone 40 mg    Plan:  -Resume home Breo  -Xopenex q6hr scheduled  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 disease (HCC)  Chronic, home medications include metformin 500 mg QD. Hba1c 5.2.     Plan:  -Hold metformin   -Insulin sliding scale, hypoglycemia protocol  -Carb controlled diet   Thrombocytopenia (HCC)  Plt 96 on admission, similar to baseline. Suspect 2/2 chronic alcohol abuse.  Plt 130 today     Plan:  -Continue home ASA  -Monitor for bleeding  -Continue home eliquis  Electrolyte abnormality  Na 130, Cl 89, bicarb 20, BUN 37, Ca 7.6 POA in setting of chronic alcohol abuse and starvation.     Replete  as needed  Chronic heart failure with preserved ejection fraction (HCC)  Chronic, appears hypovolemic on admission - no evidence of acute exacerbation. Home medications include lopressor 50 mg BID and ranexa 500mg BID    BNP 1150 POA  S/P Lasix 20mg IV    Plan:  Cardiology consulted   Daily weights, I&O's  Continue home Lopressor 50mg BID and Ranexa 500mg BID  Acute kidney injury superimposed on chronic kidney disease  (HCC) (Resolved: 11/16/2024)  Cr 1.60 on admission, baseline 1 -1.2. Suspect secondary to dehydration, starvation acidosis, significant alcohol abuse.    UA: neg    ENMA resolved, Cr. 0.77 after IVF    Plan:  -Urinary retention protocol   -Avoid hypotension   -Daily weights, strict I&O's  -Avoid nephrotoxins as able   Alcohol withdrawal (HCC)  Presenting with acute intoxication and noting 4 day binge of significant alcohol use. Hx of several admissions for similar complaints in the past. Unable to assess compliance with home naltrexone     Ethanol on admission: 381  Stopped IVF since Pt started to tolerate diet   Multiple doses of phenobarbital in ICU    Plan:  -CIWA protocol   -Seizure precautions, fall precautions, aspiration precautions  -Tigan PRN for nausea   -continue thiamine, folate  Ambulatory dysfunction  Chronic, noting hx of several falls in setting of chronic alcohol abuse - hematomas noted diffusely on skin. PT/OT ordered    CT head wo contrast: no acute intracranial abnormality  CT C-spine wo contrast: No acute cervical spine fracture or traumatic malalignment.  CT chest/abd/pelvis wo contrast: mildly displaced left lateral 7th rib fracture, no hemothorax/pneumothorax, no acute trauma in abdomen or pelvis     -PT/OT  BPH (benign prostatic hyperplasia)  Chronic, stable    continue home flomax 0.4 mg  Closed fracture of one rib of left side  CT C/A/P: Mildly displaced left lateral seventh rib fracture.     -Pain control with PRN tylenol  -incentive spirometry   High anion gap metabolic  "acidosis (Resolved: 11/16/2024)  Suspect related to starvation ketosis     Resolved after IVF  Encephalopathy acute  11/15-16: More encephalopathic overnight, likely secondary to alcohol withdrawal. Patient confused, hallucinating. Non-focal. Given multiple doses of ativan per CIWA protocol. Transferred to ICU for repeat phenobarb dosing  Received 130mg of phenobarb, started on precedex  11/17: 65mg of phenobarb  11/18: 65mg phenobarb x2  Remains encephalopathic since admission, oriented to self, year, and place at times.  Goal to wean precedex to off- currently at 1mcg/kg/hr  Consider other etiologies of encephalopathy  Considered CT head, however currently non focal  Infectious workup unremarkable, however concern for possible aspiration this stay   Ammonia 36  Consideration for baseline confusion/agitation given previous hospital notes   Mental status waxing/waning but overall less agitated  Mentation improving and tolerating diet.  Downgraded to med surg    Neuro checks  CIWA protocol  Transaminitis  Increasing transaminitis 11/16, most likely related to alcoholic hepatitis  11/18 RUQ US- Hepatomegaly and hepatic steatosis. Circumferential gallbladder wall thickening most likely related to underlying liver disease and/or hypoproteinemia. Small right hepatic cyst.  LFTs now downtrending again, no abdominal pain on exam    -trend LFTs  Hyponatremia (Resolved: 11/20/2024)  Chronic hyponatremia likely secondary to alcohol use  Sodium 129 11/17, was briefly on 1.8% NSS which was d/c'ed once sodium reached 135  Sodium 137    Continue to trend   Close I&O  Acute respiratory insufficiency  Pt was on 4L NC  in ICU  11/17 CXR \"Persistent decreased mild pulmonary edema\"     Continue nebs  Hold steroids given no wheezing on exam   Pulmonary hygiene  Aspiration precautions  Failed speech eval 11/18 and required frequent NT suctioning, now NPO  11/19 passed speech eval and stared on diet, tolerating well.    VTE Pharmacologic " Prophylaxis: VTE Score: 3 Moderate Risk (Score 3-4) - Pharmacological DVT Prophylaxis Ordered: apixaban (Eliquis).    Mobility:   Basic Mobility Inpatient Raw Score: 7  JH-HLM Goal: 2: Bed activities/Dependent transfer  JH-HLM Achieved: 2: Bed activities/Dependent transfer  JH-HLM Goal NOT achieved. Continue with multidisciplinary rounding and encourage appropriate mobility to improve upon JH-HLM goals.    Patient Centered Rounds: I performed bedside rounds with nursing staff today.   Discussions with Specialists or Other Care Team Provider: Cardio    Education and Discussions with Family / Patient: Patient declined call to .     Current Length of Stay: 6 day(s)  Current Patient Status: Inpatient   Certification Statement: The patient will continue to require additional inpatient hospital stay due to afib with RVR  Discharge Plan: Anticipate discharge in 24-48 hrs to pending PT/OT eval    Code Status: Level 1 - Full Code    Subjective   Pt was seen and examined at the bedside, NAD. Pt states that Pt is very tired, but denies chest pain, palpitation or SOB. Pt did not have any other acute complaints. Pt had BM today and Pt is urinating well      Objective :  Temp:  [98 °F (36.7 °C)-98.6 °F (37 °C)] 98.6 °F (37 °C)  HR:  [] 89  BP: (105-171)/() 147/66  Resp:  [14-29] 14  SpO2:  [88 %-99 %] 95 %  O2 Device: Nasal cannula  Nasal Cannula O2 Flow Rate (L/min):  [4 L/min-7 L/min] 7 L/min    Body mass index is 24.74 kg/m².     Input and Output Summary (last 24 hours):     Intake/Output Summary (Last 24 hours) at 11/20/2024 0651  Last data filed at 11/20/2024 0134  Gross per 24 hour   Intake 811.5 ml   Output 850 ml   Net -38.5 ml       Physical Exam  Vitals reviewed.   Constitutional:       General: He is not in acute distress.     Appearance: Normal appearance. He is not ill-appearing.   HENT:      Head: Normocephalic and atraumatic.      Right Ear: External ear normal.      Left Ear: External ear  normal.      Nose: Nose normal. No congestion or rhinorrhea.      Mouth/Throat:      Mouth: Mucous membranes are moist.      Pharynx: Oropharynx is clear. No oropharyngeal exudate or posterior oropharyngeal erythema.   Eyes:      General:         Right eye: No discharge.         Left eye: No discharge.      Conjunctiva/sclera: Conjunctivae normal.   Cardiovascular:      Rate and Rhythm: Tachycardia present. Rhythm irregular.      Pulses: Normal pulses.      Heart sounds: Normal heart sounds. No murmur heard.     No friction rub. No gallop.   Pulmonary:      Effort: Pulmonary effort is normal. No respiratory distress.      Breath sounds: No stridor. Rhonchi (Pt has referred bronchi sound due to phlem.) present. No wheezing or rales.   Chest:      Chest wall: No tenderness.   Abdominal:      General: Abdomen is flat. Bowel sounds are normal. There is no distension.      Palpations: Abdomen is soft.      Tenderness: There is no abdominal tenderness. There is no guarding.   Musculoskeletal:         General: No swelling, tenderness, deformity or signs of injury. Normal range of motion.      Cervical back: Normal range of motion and neck supple. No rigidity or tenderness.      Right lower leg: No edema.      Left lower leg: No edema.   Lymphadenopathy:      Cervical: No cervical adenopathy.   Skin:     General: Skin is warm and dry.      Capillary Refill: Capillary refill takes less than 2 seconds.      Findings: No bruising, erythema, lesion or rash.   Neurological:      General: No focal deficit present.      Mental Status: He is alert and oriented to person, place, and time. Mental status is at baseline.      Motor: No weakness.      Gait: Gait normal.      Deep Tendon Reflexes: Reflexes normal.   Psychiatric:         Mood and Affect: Mood normal.         Behavior: Behavior normal.         Thought Content: Thought content normal.           Lines/Drains:        Telemetry:  Telemetry Orders (From admission, onward)                24 Hour Telemetry Monitoring  Continuous x 24 Hours (Telem)        Question:  Reason for 24 Hour Telemetry  Answer:  Alcohol withdrawal and CIWA >7, electrolyte abnormalities, abnormal ECG and/or heart disease                     Telemetry Reviewed: Atrial fibrillation. HR averaging 110-120  Indication for Continued Telemetry Use: Arrthymias requiring medical therapy               Lab Results: I have reviewed the following results:   Results from last 7 days   Lab Units 11/20/24  0622   WBC Thousand/uL 7.07   HEMOGLOBIN g/dL 9.3*   HEMATOCRIT % 28.0*   PLATELETS Thousands/uL 154   SEGS PCT % 65   LYMPHO PCT % 13*   MONO PCT % 18*   EOS PCT % 2     Results from last 7 days   Lab Units 11/19/24  0505   SODIUM mmol/L 137   POTASSIUM mmol/L 3.7   CHLORIDE mmol/L 102   CO2 mmol/L 26   BUN mg/dL 18   CREATININE mg/dL 0.77   ANION GAP mmol/L 9   CALCIUM mg/dL 8.2*   ALBUMIN g/dL 3.0*   TOTAL BILIRUBIN mg/dL 0.86   ALK PHOS U/L 137*   ALT U/L 65*   AST U/L 52*   GLUCOSE RANDOM mg/dL 121     Results from last 7 days   Lab Units 11/18/24  0526   INR  1.24*     Results from last 7 days   Lab Units 11/19/24 2010 11/19/24  1551 11/18/24  2019 11/18/24  1557 11/18/24  1157 11/17/24  2057 11/17/24  1559 11/17/24  1105 11/17/24  0703 11/16/24  2159 11/16/24  1601 11/16/24  1140   POC GLUCOSE mg/dl 129 105 129 118 121 105 107 99 112 111 109 118     Results from last 7 days   Lab Units 11/14/24  0032   HEMOGLOBIN A1C % 5.2     Results from last 7 days   Lab Units 11/17/24  0526 11/14/24  0438   LACTIC ACID mmol/L  --  0.8   PROCALCITONIN ng/ml 0.10  --        Recent Cultures (last 7 days):   Results from last 7 days   Lab Units 11/16/24  1739 11/16/24  1719   BLOOD CULTURE  No Growth at 72 hrs. No Growth at 48 hrs.       Imaging Results Review: No pertinent imaging studies reviewed.  Other Study Results Review: EKG was reviewed.     Last 24 Hours Medication List:     Current Facility-Administered Medications:      acetaminophen (TYLENOL) tablet 650 mg, Q6H PRN    apixaban (ELIQUIS) tablet 5 mg, BID    Artificial Tears Op Soln 1 drop, Q6H PRN    aspirin chewable tablet 81 mg, Daily    diltiazem (CARDIZEM) 125 mg in sodium chloride 0.9 % 125 mL infusion, Titrated, Last Rate: 12.5 mg/hr (11/20/24 0343)    ferrous sulfate tablet 325 mg, Daily With Breakfast    fluticasone-vilanterol 200-25 mcg/actuation 1 puff, Daily    folic acid (FOLVITE) tablet 1 mg, Daily    gabapentin (NEURONTIN) capsule 300 mg, Daily    insulin lispro (HumALOG/ADMELOG) 100 units/mL subcutaneous injection 1-5 Units, HS    insulin lispro (HumALOG/ADMELOG) 100 units/mL subcutaneous injection 1-6 Units, TID AC **AND** Fingerstick Glucose (POCT), 4x Daily AC and at bedtime    labetalol (NORMODYNE) injection 10 mg, Q4H PRN    levalbuterol (XOPENEX) inhalation solution 1.25 mg, Q6H    melatonin tablet 6 mg, HS    metoprolol tartrate (LOPRESSOR) tablet 75 mg, Q12H    nicotine (NICODERM CQ) 14 mg/24hr TD 24 hr patch 1 patch, Daily    pantoprazole (PROTONIX) EC tablet 40 mg, BID AC    ranolazine (RANEXA) 12 hr tablet 500 mg, Q12H    senna-docusate sodium (SENOKOT S) 8.6-50 mg per tablet 1 tablet, Daily PRN    sodium chloride 3 % inhalation solution 4 mL, Q6H    tamsulosin (FLOMAX) capsule 0.4 mg, Daily    thiamine tablet 100 mg, Daily    trimethobenzamide (TIGAN) IM injection 200 mg, Q6H PRN    Administrative Statements   Today, Patient Was Seen By: Pbalito Presley MD      **Please Note: This note may have been constructed using a voice recognition system.**

## 2024-11-20 NOTE — OCCUPATIONAL THERAPY NOTE
OT TREATMENT   11/20/24 1115   Note Type   Note Type Cancelled Session   Cancel Reasons Refusal   Licensure   [place] License Number  Ursula Reyes MS OTR/L [id number]

## 2024-11-21 LAB
ALBUMIN SERPL BCG-MCNC: 3 G/DL (ref 3.5–5)
ALP SERPL-CCNC: 110 U/L (ref 34–104)
ALT SERPL W P-5'-P-CCNC: 44 U/L (ref 7–52)
ANION GAP SERPL CALCULATED.3IONS-SCNC: 7 MMOL/L (ref 4–13)
ANISOCYTOSIS BLD QL SMEAR: PRESENT
AST SERPL W P-5'-P-CCNC: 28 U/L (ref 13–39)
BASOPHILS # BLD MANUAL: 0 THOUSAND/UL (ref 0–0.1)
BASOPHILS NFR MAR MANUAL: 0 % (ref 0–1)
BILIRUB SERPL-MCNC: 0.51 MG/DL (ref 0.2–1)
BUN SERPL-MCNC: 12 MG/DL (ref 5–25)
CALCIUM ALBUM COR SERPL-MCNC: 8.9 MG/DL (ref 8.3–10.1)
CALCIUM SERPL-MCNC: 8.1 MG/DL (ref 8.4–10.2)
CHLORIDE SERPL-SCNC: 93 MMOL/L (ref 96–108)
CO2 SERPL-SCNC: 28 MMOL/L (ref 21–32)
CREAT SERPL-MCNC: 0.82 MG/DL (ref 0.6–1.3)
EOSINOPHIL # BLD MANUAL: 0.06 THOUSAND/UL (ref 0–0.4)
EOSINOPHIL NFR BLD MANUAL: 1 % (ref 0–6)
ERYTHROCYTE [DISTWIDTH] IN BLOOD BY AUTOMATED COUNT: 15.8 % (ref 11.6–15.1)
GFR SERPL CREATININE-BSD FRML MDRD: 94 ML/MIN/1.73SQ M
GLUCOSE SERPL-MCNC: 102 MG/DL (ref 65–140)
GLUCOSE SERPL-MCNC: 129 MG/DL (ref 65–140)
GLUCOSE SERPL-MCNC: 156 MG/DL (ref 65–140)
GLUCOSE SERPL-MCNC: 169 MG/DL (ref 65–140)
GLUCOSE SERPL-MCNC: 99 MG/DL (ref 65–140)
HCT VFR BLD AUTO: 29.6 % (ref 36.5–49.3)
HCV RNA SERPL NAA+PROBE-ACNC: NOT DETECTED K[IU]/ML
HGB BLD-MCNC: 9.7 G/DL (ref 12–17)
HYPERCHROMIA BLD QL SMEAR: PRESENT
LYMPHOCYTES # BLD AUTO: 0.85 THOUSAND/UL (ref 0.6–4.47)
LYMPHOCYTES # BLD AUTO: 14 % (ref 14–44)
MAGNESIUM SERPL-MCNC: 1.4 MG/DL (ref 1.9–2.7)
MCH RBC QN AUTO: 32.2 PG (ref 26.8–34.3)
MCHC RBC AUTO-ENTMCNC: 32.8 G/DL (ref 31.4–37.4)
MCV RBC AUTO: 98 FL (ref 82–98)
MICROCYTES BLD QL AUTO: PRESENT
MONOCYTES # BLD AUTO: 0.85 THOUSAND/UL (ref 0–1.22)
MONOCYTES NFR BLD: 14 % (ref 4–12)
NEUTROPHILS # BLD MANUAL: 4.29 THOUSAND/UL (ref 1.85–7.62)
NEUTS SEG NFR BLD AUTO: 71 % (ref 43–75)
PLATELET # BLD AUTO: 182 THOUSANDS/UL (ref 149–390)
PLATELET BLD QL SMEAR: ADEQUATE
PMV BLD AUTO: 10.5 FL (ref 8.9–12.7)
POTASSIUM SERPL-SCNC: 4.2 MMOL/L (ref 3.5–5.3)
PROT SERPL-MCNC: 5.9 G/DL (ref 6.4–8.4)
RBC # BLD AUTO: 3.01 MILLION/UL (ref 3.88–5.62)
RBC MORPH BLD: PRESENT
SODIUM SERPL-SCNC: 128 MMOL/L (ref 135–147)
WBC # BLD AUTO: 6.04 THOUSAND/UL (ref 4.31–10.16)

## 2024-11-21 PROCEDURE — 94669 MECHANICAL CHEST WALL OSCILL: CPT

## 2024-11-21 PROCEDURE — 82948 REAGENT STRIP/BLOOD GLUCOSE: CPT

## 2024-11-21 PROCEDURE — 99232 SBSQ HOSP IP/OBS MODERATE 35: CPT | Performed by: INTERNAL MEDICINE

## 2024-11-21 PROCEDURE — 80053 COMPREHEN METABOLIC PANEL: CPT

## 2024-11-21 PROCEDURE — 94640 AIRWAY INHALATION TREATMENT: CPT

## 2024-11-21 PROCEDURE — 83735 ASSAY OF MAGNESIUM: CPT

## 2024-11-21 PROCEDURE — 85007 BL SMEAR W/DIFF WBC COUNT: CPT

## 2024-11-21 PROCEDURE — 85027 COMPLETE CBC AUTOMATED: CPT

## 2024-11-21 PROCEDURE — 94668 MNPJ CHEST WALL SBSQ: CPT

## 2024-11-21 PROCEDURE — 94760 N-INVAS EAR/PLS OXIMETRY 1: CPT

## 2024-11-21 RX ORDER — SODIUM CHLORIDE 1 G/1
1 TABLET ORAL 2 TIMES DAILY WITH MEALS
Status: DISCONTINUED | OUTPATIENT
Start: 2024-11-21 | End: 2024-11-23

## 2024-11-21 RX ORDER — DIGOXIN 125 MCG
125 TABLET ORAL DAILY
Status: DISCONTINUED | OUTPATIENT
Start: 2024-11-21 | End: 2024-11-25 | Stop reason: HOSPADM

## 2024-11-21 RX ORDER — METOPROLOL TARTRATE 100 MG/1
100 TABLET ORAL 3 TIMES DAILY
Status: DISCONTINUED | OUTPATIENT
Start: 2024-11-21 | End: 2024-11-22

## 2024-11-21 RX ORDER — MAGNESIUM SULFATE HEPTAHYDRATE 40 MG/ML
4 INJECTION, SOLUTION INTRAVENOUS ONCE
Status: COMPLETED | OUTPATIENT
Start: 2024-11-21 | End: 2024-11-21

## 2024-11-21 RX ORDER — LANOLIN ALCOHOL/MO/W.PET/CERES
800 CREAM (GRAM) TOPICAL 2 TIMES DAILY
Status: DISCONTINUED | OUTPATIENT
Start: 2024-11-21 | End: 2024-11-25 | Stop reason: HOSPADM

## 2024-11-21 RX ADMIN — METOPROLOL TARTRATE 100 MG: 100 TABLET, FILM COATED ORAL at 11:38

## 2024-11-21 RX ADMIN — Medication 4 ML: at 19:36

## 2024-11-21 RX ADMIN — TAMSULOSIN HYDROCHLORIDE 0.4 MG: 0.4 CAPSULE ORAL at 08:06

## 2024-11-21 RX ADMIN — METOPROLOL TARTRATE 100 MG: 100 TABLET, FILM COATED ORAL at 21:12

## 2024-11-21 RX ADMIN — FLUTICASONE FUROATE AND VILANTEROL TRIFENATATE 1 PUFF: 200; 25 POWDER RESPIRATORY (INHALATION) at 09:11

## 2024-11-21 RX ADMIN — Medication 4 ML: at 08:44

## 2024-11-21 RX ADMIN — THIAMINE HCL TAB 100 MG 100 MG: 100 TAB at 08:05

## 2024-11-21 RX ADMIN — METOPROLOL TARTRATE 100 MG: 100 TABLET, FILM COATED ORAL at 15:40

## 2024-11-21 RX ADMIN — Medication 6 MG: at 21:12

## 2024-11-21 RX ADMIN — SODIUM CHLORIDE 1 G: 1 TABLET ORAL at 16:21

## 2024-11-21 RX ADMIN — APIXABAN 5 MG: 5 TABLET, FILM COATED ORAL at 17:00

## 2024-11-21 RX ADMIN — INSULIN LISPRO 1 UNITS: 100 INJECTION, SOLUTION INTRAVENOUS; SUBCUTANEOUS at 21:14

## 2024-11-21 RX ADMIN — Medication 400 MG: at 08:05

## 2024-11-21 RX ADMIN — APIXABAN 5 MG: 5 TABLET, FILM COATED ORAL at 08:06

## 2024-11-21 RX ADMIN — INSULIN LISPRO 1 UNITS: 100 INJECTION, SOLUTION INTRAVENOUS; SUBCUTANEOUS at 15:40

## 2024-11-21 RX ADMIN — GABAPENTIN 300 MG: 300 CAPSULE ORAL at 08:05

## 2024-11-21 RX ADMIN — PANTOPRAZOLE SODIUM 40 MG: 40 TABLET, DELAYED RELEASE ORAL at 15:40

## 2024-11-21 RX ADMIN — RANOLAZINE 500 MG: 500 TABLET, FILM COATED, EXTENDED RELEASE ORAL at 11:38

## 2024-11-21 RX ADMIN — LEVALBUTEROL HYDROCHLORIDE 1.25 MG: 1.25 SOLUTION RESPIRATORY (INHALATION) at 08:44

## 2024-11-21 RX ADMIN — RANOLAZINE 500 MG: 500 TABLET, FILM COATED, EXTENDED RELEASE ORAL at 22:33

## 2024-11-21 RX ADMIN — LEVALBUTEROL HYDROCHLORIDE 1.25 MG: 1.25 SOLUTION RESPIRATORY (INHALATION) at 19:36

## 2024-11-21 RX ADMIN — ASPIRIN 81 MG CHEWABLE TABLET 81 MG: 81 TABLET CHEWABLE at 08:05

## 2024-11-21 RX ADMIN — Medication 800 MG: at 17:00

## 2024-11-21 RX ADMIN — MAGNESIUM SULFATE HEPTAHYDRATE 4 G: 40 INJECTION, SOLUTION INTRAVENOUS at 06:47

## 2024-11-21 RX ADMIN — DIGOXIN 125 MCG: 125 TABLET ORAL at 13:13

## 2024-11-21 RX ADMIN — PANTOPRAZOLE SODIUM 40 MG: 40 TABLET, DELAYED RELEASE ORAL at 06:06

## 2024-11-21 RX ADMIN — FOLIC ACID 1 MG: 1 TABLET ORAL at 08:05

## 2024-11-21 RX ADMIN — FERROUS SULFATE TAB 325 MG (65 MG ELEMENTAL FE) 325 MG: 325 (65 FE) TAB at 08:05

## 2024-11-21 NOTE — ASSESSMENT & PLAN NOTE
"Pt was on 4L NC  in ICU  11/17 CXR \"Persistent decreased mild pulmonary edema\"     Continue nebs  Hold steroids given no wheezing on exam   Pulmonary hygiene  Aspiration precautions  Failed speech eval 11/18 and required frequent NT suctioning, now NPO  11/19 passed speech eval and stared on diet, tolerating well.  Pt is currently on 2L NC  Continue chest oscillator and Acapella to help bring up the mucus    "

## 2024-11-21 NOTE — OCCUPATIONAL THERAPY NOTE
Occupational Therapy Cancellation Note       11/21/24 1515   OT Last Visit   OT Visit Date 11/21/24   Note Type   Note Type Cancelled Session   Cancel Reasons Refusal  (Chart review complete. Pt refusing participation in OT treatment today despite encouragement/education, reports he just wants to sleep. Will follow.)     Cassie Plunkett OTR/L   [place] License # [id number]  [place] License # [id number]

## 2024-11-21 NOTE — ASSESSMENT & PLAN NOTE
Chronic, stable, presenting with cough that patient reports is at baseline - noting he is non compliant with home inhalers  Patient received 2 doses of p.o. prednisone 40 mg    Plan:  -continue home Breo  -Xopenex q6hr scheduled

## 2024-11-21 NOTE — PHYSICAL THERAPY NOTE
PHYSICAL THERAPY     11/21/24 1415   Note Type   Note Type Cancelled Session   Cancel Reasons Refusal  (Patient deferred participation in physical therapy session despite much encouragement by therapist.  Will reattempt at a later time as patient is agreeable)   Licensure   [place] License Number  Heather Dacia  [id number]

## 2024-11-21 NOTE — PLAN OF CARE
Problem: PAIN - ADULT  Goal: Verbalizes/displays adequate comfort level or baseline comfort level  Description: Interventions:  - Encourage patient to monitor pain and request assistance  - Assess pain using appropriate pain scale  - Administer analgesics based on type and severity of pain and evaluate response  - Implement non-pharmacological measures as appropriate and evaluate response  - Consider cultural and social influences on pain and pain management  - Notify physician/advanced practitioner if interventions unsuccessful or patient reports new pain  Outcome: Progressing     Problem: INFECTION - ADULT  Goal: Absence or prevention of progression during hospitalization  Description: INTERVENTIONS:  - Assess and monitor for signs and symptoms of infection  - Monitor lab/diagnostic results  - Monitor all insertion sites, i.e. indwelling lines, tubes, and drains  - Monitor endotracheal if appropriate and nasal secretions for changes in amount and color  - Latimer appropriate cooling/warming therapies per order  - Administer medications as ordered  - Instruct and encourage patient and family to use good hand hygiene technique  - Identify and instruct in appropriate isolation precautions for identified infection/condition  Outcome: Progressing  Goal: Absence of fever/infection during neutropenic period  Description: INTERVENTIONS:  - Monitor WBC    Outcome: Progressing     Problem: SAFETY ADULT  Goal: Patient will remain free of falls  Description: INTERVENTIONS:  - Educate patient/family on patient safety including physical limitations  - Instruct patient to call for assistance with activity   - Consult OT/PT to assist with strengthening/mobility   - Keep Call bell within reach  - Keep bed low and locked with side rails adjusted as appropriate  - Keep care items and personal belongings within reach  - Initiate and maintain comfort rounds  - Make Fall Risk Sign visible to staff  - Offer Toileting every 2 Hours,  in advance of need  - Initiate/Maintain bed alarm  - Obtain necessary fall risk management equipment  - Apply yellow socks and bracelet for high fall risk patients  - Consider moving patient to room near nurses station  Outcome: Progressing  Goal: Maintain or return to baseline ADL function  Description: INTERVENTIONS:  -  Assess patient's ability to carry out ADLs; assess patient's baseline for ADL function and identify physical deficits which impact ability to perform ADLs (bathing, care of mouth/teeth, toileting, grooming, dressing, etc.)  - Assess/evaluate cause of self-care deficits   - Assess range of motion  - Assess patient's mobility; develop plan if impaired  - Assess patient's need for assistive devices and provide as appropriate  - Encourage maximum independence but intervene and supervise when necessary  - Involve family in performance of ADLs  - Assess for home care needs following discharge   - Consider OT consult to assist with ADL evaluation and planning for discharge  - Provide patient education as appropriate  Outcome: Progressing  Goal: Maintains/Returns to pre admission functional level  Description: INTERVENTIONS:  - Perform AM-PAC 6 Click Basic Mobility/ Daily Activity assessment daily.  - Set and communicate daily mobility goal to care team and patient/family/caregiver.   - Collaborate with rehabilitation services on mobility goals if consulted  - Perform Range of Motion 2 times a day.  - Reposition patient every 2 hours.  - Dangle patient 2 times a day  - Stand patient 2 times a day  - Ambulate patient 2 times a day  - Out of bed to chair 2 times a day   - Out of bed for meals 2 times a day  - Out of bed for toileting  - Record patient progress and toleration of activity level   Outcome: Progressing     Problem: DISCHARGE PLANNING  Goal: Discharge to home or other facility with appropriate resources  Description: INTERVENTIONS:  - Identify barriers to discharge w/patient and caregiver  -  Arrange for needed discharge resources and transportation as appropriate  - Identify discharge learning needs (meds, wound care, etc.)  - Arrange for interpretive services to assist at discharge as needed  - Refer to Case Management Department for coordinating discharge planning if the patient needs post-hospital services based on physician/advanced practitioner order or complex needs related to functional status, cognitive ability, or social support system  Outcome: Progressing

## 2024-11-21 NOTE — PROGRESS NOTES
Progress Note - Family Medicine   Name: Gregg Partida 64 y.o. male I MRN: [id number]  Unit/Bed#: ICU 11 I Date of Admission: 11/14/2024   Date of Service: 11/21/2024 I Hospital Day: 7     Assessment & Plan  Atrial fibrillation with RVR (HCC)  Presented to ED with afib RVR, HR > 140 sustained. EKG afib RVR with 's. Patient noting he has not been compliant with home eliquis or lopressor 50 mg BID.    In ED: s/p lopressor 5 mg x 2, cardizem 15 mg x 1  11/15 OVN: Cardizem x2 Lopressor x1    Plan:  - Pt was on Cardizem gtt  - Pt refuses cardioversion.   - trial of adding cardizem 30mg Q6hrs and stopping Cardizem gtt was initiated, then increased to 90mg Q6hrs due to having RVR again.  - s/p digoxin x 2, rate started to come down    - Cardiology consulted, appreciate recs  - Titratable cadizem gtt stopped and started on IV Digoxin loading 125 mcg Q8hrs. HR improved yesterday.  - Pt started to have RVR again overnight, but still asymptomatic  - Continue home Eliquis 5 mg BID   - increased home lopressor 100mg BID  - Continue to monitor for rate control and BP  - telemetry   COPD (chronic obstructive pulmonary disease) (HCC)  Chronic, stable, presenting with cough that patient reports is at baseline - noting he is non compliant with home inhalers  Patient received 2 doses of p.o. prednisone 40 mg    Plan:  -continue home Breo  -Xopenex q6hr scheduled  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 disease (HCC)  Chronic, home medications include metformin 500 mg QD. Hba1c 5.2.     Plan:  -Hold metformin   -Insulin sliding scale, hypoglycemia protocol  -Carb controlled diet   Thrombocytopenia (HCC)  Plt 96 on admission, similar to baseline. Suspect 2/2 chronic alcohol abuse.  Plt 130 today     Plan:  -Continue home ASA  -Monitor for bleeding  -Continue home eliquis  Electrolyte abnormality  Na 130, Cl 89, bicarb 20, BUN 37, Ca 7.6 POA in setting of chronic alcohol abuse and starvation.     Replete as  needed  Chronic heart failure with preserved ejection fraction (HCC)  Chronic, appears hypovolemic on admission - no evidence of acute exacerbation. Home medications include lopressor 50 mg BID and ranexa 500mg BID    BNP 1150 POA  S/P Lasix 20mg IV    Plan:  Cardiology consulted, given IV lasix 40 mg yesterday  Daily weights, I&O's  increased home Lopressor to 100mg BID and continue home Ranexa 500mg BID  Alcohol withdrawal (HCC)  Presenting with acute intoxication and noting 4 day binge of significant alcohol use. Hx of several admissions for similar complaints in the past. Unable to assess compliance with home naltrexone     Ethanol on admission: 381  Stopped IVF since Pt started to tolerate diet   Multiple doses of phenobarbital in ICU    Plan:  -CIWA protocol   -Seizure precautions, fall precautions, aspiration precautions  -Tigan PRN for nausea   -continue thiamine, folate  Ambulatory dysfunction  Chronic, noting hx of several falls in setting of chronic alcohol abuse - hematomas noted diffusely on skin. PT/OT ordered    CT head wo contrast: no acute intracranial abnormality  CT C-spine wo contrast: No acute cervical spine fracture or traumatic malalignment.  CT chest/abd/pelvis wo contrast: mildly displaced left lateral 7th rib fracture, no hemothorax/pneumothorax, no acute trauma in abdomen or pelvis     -PT/OT  BPH (benign prostatic hyperplasia)  Chronic, stable    continue home flomax 0.4 mg  Closed fracture of one rib of left side  CT C/A/P: Mildly displaced left lateral seventh rib fracture.     -Pain control with PRN tylenol  -incentive spirometry   Encephalopathy acute  11/15-16: More encephalopathic overnight, likely secondary to alcohol withdrawal. Patient confused, hallucinating. Non-focal. Given multiple doses of ativan per CIWA protocol. Transferred to ICU for repeat phenobarb dosing  Received 130mg of phenobarb, started on precedex  11/17: 65mg of phenobarb  11/18: 65mg phenobarb x2  Remains  "encephalopathic since admission, oriented to self, year, and place at times.  Goal to wean precedex to off- currently at 1mcg/kg/hr  Consider other etiologies of encephalopathy  Considered CT head, however currently non focal  Infectious workup unremarkable, however concern for possible aspiration this stay   Ammonia 36  Consideration for baseline confusion/agitation given previous hospital notes   Mental status waxing/waning but overall less agitated  Mentation improving and tolerating diet.  Downgraded to med surg    Neuro checks  CIWA protocol  Transaminitis  Increasing transaminitis 11/16, most likely related to alcoholic hepatitis  11/18 RUQ US- Hepatomegaly and hepatic steatosis. Circumferential gallbladder wall thickening most likely related to underlying liver disease and/or hypoproteinemia. Small right hepatic cyst.  LFTs now downtrending again, no abdominal pain on exam    -trend LFTs  Acute respiratory insufficiency  Pt was on 4L NC  in ICU  11/17 CXR \"Persistent decreased mild pulmonary edema\"     Continue nebs  Hold steroids given no wheezing on exam   Pulmonary hygiene  Aspiration precautions  Failed speech eval 11/18 and required frequent NT suctioning, now NPO  11/19 passed speech eval and stared on diet, tolerating well.  Pt is currently on 2L NC  Continue chest oscillator and Acapella to help bring up the mucus      VTE Pharmacologic Prophylaxis: VTE Score: 3 Moderate Risk (Score 3-4) - Pharmacological DVT Prophylaxis Ordered: apixaban (Eliquis).    Mobility:   Basic Mobility Inpatient Raw Score: 7  JH-HLM Goal: 2: Bed activities/Dependent transfer  JH-HLM Achieved: 2: Bed activities/Dependent transfer  JH-HLM Goal achieved. Continue to encourage appropriate mobility.    Patient Centered Rounds: I performed bedside rounds with nursing staff today.   Discussions with Specialists or Other Care Team Provider: Cardio    Education and Discussions with Family / Patient: Patient declined call to contact " person.     Current Length of Stay: 7 day(s)  Current Patient Status: Inpatient   Certification Statement: The patient will continue to require additional inpatient hospital stay due to Afib RVR  Discharge Plan: Anticipate discharge in 24-48 hrs to pending PT/OT eval    Code Status: Level 1 - Full Code    Subjective   Pt was seen and examined at the bedside, NAD. Pt states that Pt is still tired, but denies chest pain, palpitation or SOB. Pt did not have any other acute complaints other than mild abdominal pain. Pt has good BM and Pt is urinating well     Objective :  Temp:  [97.5 °F (36.4 °C)-98.7 °F (37.1 °C)] 98.4 °F (36.9 °C)  HR:  [] 149  BP: (116-179)/() 135/87  Resp:  [12-41] 17  SpO2:  [94 %-100 %] 99 %  O2 Device: Nasal cannula  Nasal Cannula O2 Flow Rate (L/min):  [2 L/min] 2 L/min    Body mass index is 24.74 kg/m².     Input and Output Summary (last 24 hours):     Intake/Output Summary (Last 24 hours) at 11/21/2024 0729  Last data filed at 11/21/2024 0529  Gross per 24 hour   Intake 1020 ml   Output 3875 ml   Net -2855 ml       Physical Exam  Vitals reviewed.   Constitutional:       General: He is not in acute distress.     Appearance: Normal appearance. He is not ill-appearing.   HENT:      Head: Normocephalic and atraumatic.      Right Ear: External ear normal.      Left Ear: External ear normal.      Nose: Nose normal. No congestion or rhinorrhea.      Mouth/Throat:      Mouth: Mucous membranes are moist.      Pharynx: Oropharynx is clear. No oropharyngeal exudate or posterior oropharyngeal erythema.   Eyes:      General:         Right eye: No discharge.         Left eye: No discharge.      Conjunctiva/sclera: Conjunctivae normal.   Cardiovascular:      Rate and Rhythm: Tachycardia present. Rhythm irregular.      Pulses: Normal pulses.      Heart sounds: Normal heart sounds. No murmur heard.     No friction rub. No gallop.   Pulmonary:      Effort: Pulmonary effort is normal. No respiratory  distress.      Breath sounds: No stridor. Rhonchi (Pt has referred bronchi sound due to phlem, improving) present. No wheezing or rales.   Chest:      Chest wall: No tenderness.   Abdominal:      General: Abdomen is flat. Bowel sounds are normal. There is no distension.      Palpations: Abdomen is soft.      Tenderness: There is abdominal tenderness (mild tenderness on left abodomen, no signs of acute abdomen). There is no guarding.   Musculoskeletal:         General: No swelling, tenderness, deformity or signs of injury. Normal range of motion.      Cervical back: Normal range of motion and neck supple. No rigidity or tenderness.      Right lower leg: No edema.      Left lower leg: No edema.   Lymphadenopathy:      Cervical: No cervical adenopathy.   Skin:     General: Skin is warm and dry.      Capillary Refill: Capillary refill takes less than 2 seconds.      Findings: No bruising, erythema, lesion or rash.   Neurological:      General: No focal deficit present.      Mental Status: He is alert and oriented to person, place, and time. Mental status is at baseline.      Motor: No weakness.      Gait: Gait normal.      Deep Tendon Reflexes: Reflexes normal.   Psychiatric:         Mood and Affect: Mood normal.         Behavior: Behavior normal.         Thought Content: Thought content normal.           Lines/Drains:        Telemetry:  Telemetry Orders (From admission, onward)               24 Hour Telemetry Monitoring  Continuous x 24 Hours (Telem)        Expiring   Question:  Reason for 24 Hour Telemetry  Answer:  Arrhythmias requiring acute medical intervention / PPM or ICD malfunction                     Telemetry Reviewed: Atrial fibrillation. HR averaging 150  Indication for Continued Telemetry Use: Arrthymias requiring medical therapy               Lab Results: I have reviewed the following results:   Results from last 7 days   Lab Units 11/21/24  0529 11/20/24  0622   WBC Thousand/uL 6.04 7.07   HEMOGLOBIN  g/dL 9.7* 9.3*   HEMATOCRIT % 29.6* 28.0*   PLATELETS Thousands/uL 182 154   SEGS PCT %  --  65   LYMPHO PCT % 14 13*   MONO PCT % 14* 18*   EOS PCT % 1 2     Results from last 7 days   Lab Units 11/21/24  0529   SODIUM mmol/L 128*   POTASSIUM mmol/L 4.2   CHLORIDE mmol/L 93*   CO2 mmol/L 28   BUN mg/dL 12   CREATININE mg/dL 0.82   ANION GAP mmol/L 7   CALCIUM mg/dL 8.1*   ALBUMIN g/dL 3.0*   TOTAL BILIRUBIN mg/dL 0.51   ALK PHOS U/L 110*   ALT U/L 44   AST U/L 28   GLUCOSE RANDOM mg/dL 99     Results from last 7 days   Lab Units 11/18/24  0526   INR  1.24*     Results from last 7 days   Lab Units 11/21/24  0651 11/20/24  1956 11/20/24  1558 11/20/24  1102 11/20/24  0703 11/19/24  2010 11/19/24  1551 11/18/24  2019 11/18/24  1557 11/18/24  1157 11/17/24  2057 11/17/24  1559   POC GLUCOSE mg/dl 102 127 141* 112 99 129 105 129 118 121 105 107         Results from last 7 days   Lab Units 11/17/24  0526   PROCALCITONIN ng/ml 0.10       Recent Cultures (last 7 days):   Results from last 7 days   Lab Units 11/16/24  1739 11/16/24  1719   BLOOD CULTURE  No Growth After 4 Days. No Growth at 72 hrs.       Imaging Results Review: No pertinent imaging studies reviewed.  Other Study Results Review: No additional pertinent studies reviewed.    Last 24 Hours Medication List:     Current Facility-Administered Medications:     acetaminophen (TYLENOL) tablet 650 mg, Q6H PRN    apixaban (ELIQUIS) tablet 5 mg, BID    Artificial Tears Op Soln 1 drop, Q6H PRN    aspirin chewable tablet 81 mg, Daily    ferrous sulfate tablet 325 mg, Daily With Breakfast    fluticasone-vilanterol 200-25 mcg/actuation 1 puff, Daily    folic acid (FOLVITE) tablet 1 mg, Daily    gabapentin (NEURONTIN) capsule 300 mg, Daily    insulin lispro (HumALOG/ADMELOG) 100 units/mL subcutaneous injection 1-5 Units, HS    insulin lispro (HumALOG/ADMELOG) 100 units/mL subcutaneous injection 1-6 Units, TID AC **AND** Fingerstick Glucose (POCT), 4x Daily AC and at  bedtime    labetalol (NORMODYNE) injection 10 mg, Q4H PRN    levalbuterol (XOPENEX) inhalation solution 1.25 mg, Q6H    magnesium Oxide (MAG-OX) tablet 400 mg, BID    magnesium sulfate 4 g/100 mL IVPB (premix) 4 g, Once, Last Rate: 4 g (11/21/24 0647)    melatonin tablet 6 mg, HS    metoprolol tartrate (LOPRESSOR) tablet 100 mg, Q12H    nicotine (NICODERM CQ) 14 mg/24hr TD 24 hr patch 1 patch, Daily    pantoprazole (PROTONIX) EC tablet 40 mg, BID AC    ranolazine (RANEXA) 12 hr tablet 500 mg, Q12H    senna-docusate sodium (SENOKOT S) 8.6-50 mg per tablet 1 tablet, Daily PRN    sodium chloride 3 % inhalation solution 4 mL, Q6H    tamsulosin (FLOMAX) capsule 0.4 mg, Daily    thiamine tablet 100 mg, Daily    trimethobenzamide (TIGAN) IM injection 200 mg, Q6H PRN    Administrative Statements   Today, Patient Was Seen By: Pablito Presley MD      **Please Note: This note may have been constructed using a voice recognition system.**

## 2024-11-21 NOTE — ASSESSMENT & PLAN NOTE
Presenting with acute intoxication and noting 4 day binge of significant alcohol use. Hx of several admissions for similar complaints in the past. Unable to assess compliance with home naltrexone     Ethanol on admission: 381  Stopped IVF since Pt started to tolerate diet   Multiple doses of phenobarbital in ICU    Plan:  -CIWA protocol   -Seizure precautions, fall precautions, aspiration precautions  -Tigan PRN for nausea   -continue thiamine, folate   Procedure Start   96

## 2024-11-21 NOTE — ASSESSMENT & PLAN NOTE
Wt Readings from Last 3 Encounters:   11/21/24 87.4 kg (192 lb 10.9 oz)   09/19/24 86.2 kg (190 lb 1.6 oz)   09/16/24 83.3 kg (183 lb 10.3 oz)   8/16/2024 TTE: EF visibly estimated 65% with severe left atrial dilatation  BNP 1150  continue PRN diuretics  continue Lopressor 100 mg BID  monitor I and O, daily weights and labs  CHF education

## 2024-11-21 NOTE — ASSESSMENT & PLAN NOTE
Lab Results   Component Value Date    HGBA1C 5.2 11/14/2024       Recent Labs     11/20/24  1558 11/20/24  1956 11/21/24  0651 11/21/24  1058   POCGLU 141* 127 102 129       Blood Sugar Average: Last 72 hrs:  (P) 119.2499683720669034  managed per primary team

## 2024-11-21 NOTE — ASSESSMENT & PLAN NOTE
patient with history of recurrent atrial fibrillation  8/16/2024 TTE: EF visibly estimated 65% with severe dilatation of left atrium  unclear last time patient took his medications secondary to intoxication  telemetry demonstrates rapid atrial fibrillation rates 130s to 160s  patient started on Cardizem drip 11/15/2024  discuss with patient possible cardioversion, he differs and declines procedure at this time.  Patient is been on maximum dose of Cardizem without improvement of heart rate. Doubtful patient will successfully cardioversion due to the severe dilatation of his left atrium.  Overnight 11/19/2024 patient received two doses of IV digoxin 0.25 mg which seem to improve heart rate. Completed digoxin load on 11/20/2024  will start digoxin 0.125 mg daily starting 11/21/2024  continue Lopressor increased dose to 100 mg BID  Continue Eliquis 5 mg bid  Will follow rate control strategy

## 2024-11-21 NOTE — PROGRESS NOTES
Progress Note - Cardiology   Name: Gregg Partida 64 y.o. male I MRN: [id number]  Unit/Bed#: ICU 11 I Date of Admission: 11/14/2024   Date of Service: 11/21/2024 I Hospital Day: 7    Assessment & Plan  Atrial fibrillation with RVR ([facility])  patient with history of recurrent atrial fibrillation  8/16/2024 TTE: EF visibly estimated 65% with severe dilatation of left atrium  unclear last time patient took his medications secondary to intoxication  telemetry demonstrates rapid atrial fibrillation rates 130s to 160s  patient started on Cardizem drip 11/15/2024  discuss with patient possible cardioversion, he differs and declines procedure at this time.  Patient is been on maximum dose of Cardizem without improvement of heart rate. Doubtful patient will successfully cardioversion due to the severe dilatation of his left atrium.  Overnight 11/19/2024 patient received two doses of IV digoxin 0.25 mg which seem to improve heart rate. Completed digoxin load on 11/20/2024  will start digoxin 0.125 mg daily starting 11/21/2024  continue Lopressor increased dose to 100 mg BID  Continue Eliquis 5 mg bid  Will follow rate control strategy  Alcohol withdrawal (HCC)  alcohol on admission was 381  CIWA protocol ongoing  Chronic heart failure with preserved ejection fraction ([facility])  Wt Readings from Last 3 Encounters:   11/21/24 87.4 kg (192 lb 10.9 oz)   09/19/24 86.2 kg (190 lb 1.6 oz)   09/16/24 83.3 kg (183 lb 10.3 oz)   8/16/2024 TTE: EF visibly estimated 65% with severe left atrial dilatation  BNP 1150  continue PRN diuretics  continue Lopressor 100 mg BID  monitor I and O, daily weights and labs  CHF education          COPD (chronic obstructive pulmonary disease) ([facility])  continue current inhaler regimen per primary team  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 disease ([facility])  Lab Results   Component Value Date    HGBA1C 5.2 11/14/2024       Recent Labs     11/20/24  1558 11/20/24  1956 11/21/24  0651 11/21/24  1058    POCGLU 141* 127 102 129       Blood Sugar Average: Last 72 hrs:  (P) 119.3299027870836445  managed per primary team    Subjective   Chief Complaint: none specific. Patient lying comfortably in bed without complaints. Remains in atrial flutter with ventricular rate varying between 78 to 140. Will start daily digoxin and continue to monitor      Objective :  Temp:  [96.7 °F (35.9 °C)-98.4 °F (36.9 °C)] 96.7 °F (35.9 °C)  HR:  [] 85  BP: (119-179)/() 150/90  Resp:  [12-27] 18  SpO2:  [94 %-100 %] 98 %  O2 Device: Nasal cannula  Nasal Cannula O2 Flow Rate (L/min):  [2 L/min] 2 L/min  Orthostatic Blood Pressures      Flowsheet Row Most Recent Value   Blood Pressure 150/90 filed at 11/21/2024 1000   Patient Position - Orthostatic VS Lying filed at 11/21/2024 0400          First Weight: Weight - Scale: 84.3 kg (185 lb 13.6 oz) (11/14/24 0900)  Vitals:    11/20/24 1200 11/21/24 0531   Weight: 86.9 kg (191 lb 9.3 oz) 87.4 kg (192 lb 10.9 oz)     Physical Exam  Vitals and nursing note reviewed.   Constitutional:       Appearance: Normal appearance. He is ill-appearing (Chronically).   HENT:      Right Ear: External ear normal.      Left Ear: External ear normal.   Eyes:      General:         Right eye: No discharge.         Left eye: No discharge.   Cardiovascular:      Rate and Rhythm: Normal rate. Rhythm irregular.      Pulses: Normal pulses.      Heart sounds: Normal heart sounds.   Pulmonary:      Effort: Pulmonary effort is normal.      Breath sounds: Examination of the right-lower field reveals decreased breath sounds. Examination of the left-lower field reveals decreased breath sounds. Decreased breath sounds present.   Abdominal:      General: Bowel sounds are normal.      Palpations: Abdomen is soft.   Musculoskeletal:      Right lower leg: No edema.      Left lower leg: No edema.   Skin:     General: Skin is warm and dry.      Capillary Refill: Capillary refill takes less than 2 seconds.    Neurological:      General: No focal deficit present.      Mental Status: He is alert. Mental status is at baseline.           Lab Results: I have reviewed the following results:  Results from last 7 days   Lab Units 11/21/24  0529 11/20/24  0622 11/19/24  0505   WBC Thousand/uL 6.04 7.07 9.65   HEMOGLOBIN g/dL 9.7* 9.3* 10.0*   HEMATOCRIT % 29.6* 28.0* 30.0*   PLATELETS Thousands/uL 182 154 130*     Results from last 7 days   Lab Units 11/21/24  0529 11/20/24  0622 11/19/24  0505   POTASSIUM mmol/L 4.2 3.7 3.7   CHLORIDE mmol/L 93* 95* 102   CO2 mmol/L 28 29 26   BUN mg/dL 12 12 18   CREATININE mg/dL 0.82 0.74 0.77   CALCIUM mg/dL 8.1* 7.8* 8.2*     Results from last 7 days   Lab Units 11/18/24  0526 11/16/24  1719   INR  1.24* 1.07     Lab Results   Component Value Date    HGBA1C 5.2 11/14/2024     Lab Results   Component Value Date    CKTOTAL 258 09/14/2024    CKMB 2.0 04/22/2021    CKMBINDEX <1.0 04/22/2021    TROPONINI <0.02 06/25/2021             VTE Pharmacologic Prophylaxis: VTE covered by:  apixaban, Oral, 5 mg at 11/21/24 0806     VTE Mechanical Prophylaxis: sequential compression device    Abigail TRAN  Cardiology

## 2024-11-21 NOTE — ASSESSMENT & PLAN NOTE
Presented to ED with afib RVR, HR > 140 sustained. EKG afib RVR with 's. Patient noting he has not been compliant with home eliquis or lopressor 50 mg BID.    In ED: s/p lopressor 5 mg x 2, cardizem 15 mg x 1  11/15 OVN: Cardizem x2 Lopressor x1    Plan:  - Pt was on Cardizem gtt  - Pt refuses cardioversion.   - trial of adding cardizem 30mg Q6hrs and stopping Cardizem gtt was initiated, then increased to 90mg Q6hrs due to having RVR again.  - s/p digoxin x 2, rate started to come down    - Cardiology consulted, appreciate recs  - Titratable cadizem gtt stopped and started on IV Digoxin loading 125 mcg Q8hrs. HR improved yesterday.  - Pt started to have RVR again overnight, but still asymptomatic  - Continue home Eliquis 5 mg BID   - increased home lopressor 200mg BID  - added PO digoxin 125mcg daily   - Continue to monitor for rate control and BP  - telemetry  - Pt is not tolerating PO today, will observe him one more night

## 2024-11-22 ENCOUNTER — PATIENT OUTREACH (OUTPATIENT)
Age: 64
End: 2024-11-22

## 2024-11-22 PROBLEM — D69.6 THROMBOCYTOPENIA (HCC): Chronic | Status: RESOLVED | Noted: 2019-06-09 | Resolved: 2024-11-22

## 2024-11-22 PROBLEM — R06.89 ACUTE RESPIRATORY INSUFFICIENCY: Status: RESOLVED | Noted: 2024-11-17 | Resolved: 2024-11-22

## 2024-11-22 PROBLEM — R74.01 TRANSAMINITIS: Status: RESOLVED | Noted: 2021-04-21 | Resolved: 2024-11-22

## 2024-11-22 PROBLEM — F10.939 ALCOHOL WITHDRAWAL (HCC): Status: RESOLVED | Noted: 2024-03-25 | Resolved: 2024-11-22

## 2024-11-22 PROBLEM — G93.40 ENCEPHALOPATHY ACUTE: Status: RESOLVED | Noted: 2021-04-21 | Resolved: 2024-11-22

## 2024-11-22 LAB
ALBUMIN SERPL BCG-MCNC: 3 G/DL (ref 3.5–5)
ALP SERPL-CCNC: 98 U/L (ref 34–104)
ALT SERPL W P-5'-P-CCNC: 36 U/L (ref 7–52)
ANION GAP SERPL CALCULATED.3IONS-SCNC: 4 MMOL/L (ref 4–13)
AST SERPL W P-5'-P-CCNC: 22 U/L (ref 13–39)
BACTERIA BLD CULT: NORMAL
BACTERIA BLD CULT: NORMAL
BILIRUB SERPL-MCNC: 0.4 MG/DL (ref 0.2–1)
BUN SERPL-MCNC: 16 MG/DL (ref 5–25)
CALCIUM ALBUM COR SERPL-MCNC: 8.7 MG/DL (ref 8.3–10.1)
CALCIUM SERPL-MCNC: 7.9 MG/DL (ref 8.4–10.2)
CHLORIDE SERPL-SCNC: 96 MMOL/L (ref 96–108)
CHOLEST SERPL-MCNC: 153 MG/DL (ref ?–200)
CO2 SERPL-SCNC: 29 MMOL/L (ref 21–32)
CREAT SERPL-MCNC: 0.89 MG/DL (ref 0.6–1.3)
ERYTHROCYTE [DISTWIDTH] IN BLOOD BY AUTOMATED COUNT: 15.7 % (ref 11.6–15.1)
GFR SERPL CREATININE-BSD FRML MDRD: 91 ML/MIN/1.73SQ M
GLUCOSE SERPL-MCNC: 101 MG/DL (ref 65–140)
GLUCOSE SERPL-MCNC: 112 MG/DL (ref 65–140)
GLUCOSE SERPL-MCNC: 117 MG/DL (ref 65–140)
GLUCOSE SERPL-MCNC: 138 MG/DL (ref 65–140)
GLUCOSE SERPL-MCNC: 147 MG/DL (ref 65–140)
HCT VFR BLD AUTO: 29.7 % (ref 36.5–49.3)
HDLC SERPL-MCNC: 46 MG/DL
HGB BLD-MCNC: 9.7 G/DL (ref 12–17)
LDLC SERPL CALC-MCNC: 87 MG/DL (ref 0–100)
MAGNESIUM SERPL-MCNC: 1.6 MG/DL (ref 1.9–2.7)
MCH RBC QN AUTO: 32.4 PG (ref 26.8–34.3)
MCHC RBC AUTO-ENTMCNC: 32.7 G/DL (ref 31.4–37.4)
MCV RBC AUTO: 99 FL (ref 82–98)
NONHDLC SERPL-MCNC: 107 MG/DL
PLATELET # BLD AUTO: 203 THOUSANDS/UL (ref 149–390)
PMV BLD AUTO: 10.5 FL (ref 8.9–12.7)
POTASSIUM SERPL-SCNC: 4.1 MMOL/L (ref 3.5–5.3)
PROT SERPL-MCNC: 5.9 G/DL (ref 6.4–8.4)
RBC # BLD AUTO: 2.99 MILLION/UL (ref 3.88–5.62)
SODIUM SERPL-SCNC: 129 MMOL/L (ref 135–147)
TRIGL SERPL-MCNC: 101 MG/DL (ref ?–150)
WBC # BLD AUTO: 6.59 THOUSAND/UL (ref 4.31–10.16)

## 2024-11-22 PROCEDURE — 97535 SELF CARE MNGMENT TRAINING: CPT

## 2024-11-22 PROCEDURE — 99232 SBSQ HOSP IP/OBS MODERATE 35: CPT | Performed by: INTERNAL MEDICINE

## 2024-11-22 PROCEDURE — 97530 THERAPEUTIC ACTIVITIES: CPT

## 2024-11-22 PROCEDURE — 94760 N-INVAS EAR/PLS OXIMETRY 1: CPT

## 2024-11-22 PROCEDURE — 85027 COMPLETE CBC AUTOMATED: CPT

## 2024-11-22 PROCEDURE — 94640 AIRWAY INHALATION TREATMENT: CPT

## 2024-11-22 PROCEDURE — 80053 COMPREHEN METABOLIC PANEL: CPT

## 2024-11-22 PROCEDURE — 83735 ASSAY OF MAGNESIUM: CPT

## 2024-11-22 PROCEDURE — 82948 REAGENT STRIP/BLOOD GLUCOSE: CPT

## 2024-11-22 PROCEDURE — 80061 LIPID PANEL: CPT

## 2024-11-22 RX ORDER — METOPROLOL TARTRATE 100 MG/1
100 TABLET ORAL ONCE
Status: DISCONTINUED | OUTPATIENT
Start: 2024-11-22 | End: 2024-11-22

## 2024-11-22 RX ORDER — ONDANSETRON 2 MG/ML
4 INJECTION INTRAMUSCULAR; INTRAVENOUS ONCE
Status: COMPLETED | OUTPATIENT
Start: 2024-11-22 | End: 2024-11-22

## 2024-11-22 RX ORDER — SODIUM CHLORIDE 1 G/1
1 TABLET ORAL 2 TIMES DAILY WITH MEALS
Qty: 60 TABLET | Refills: 1 | Status: CANCELLED | OUTPATIENT
Start: 2024-11-22

## 2024-11-22 RX ORDER — METOPROLOL TARTRATE 100 MG/1
200 TABLET ORAL EVERY 12 HOURS SCHEDULED
Status: DISCONTINUED | OUTPATIENT
Start: 2024-11-22 | End: 2024-11-25 | Stop reason: HOSPADM

## 2024-11-22 RX ORDER — MAGNESIUM SULFATE HEPTAHYDRATE 40 MG/ML
4 INJECTION, SOLUTION INTRAVENOUS ONCE
Status: COMPLETED | OUTPATIENT
Start: 2024-11-22 | End: 2024-11-22

## 2024-11-22 RX ORDER — SODIUM CHLORIDE 9 MG/ML
50 INJECTION, SOLUTION INTRAVENOUS CONTINUOUS
Status: DISCONTINUED | OUTPATIENT
Start: 2024-11-22 | End: 2024-11-22

## 2024-11-22 RX ORDER — METOPROLOL TARTRATE 100 MG/1
100 TABLET ORAL 3 TIMES DAILY
Qty: 90 TABLET | Refills: 1 | Status: CANCELLED | OUTPATIENT
Start: 2024-11-22

## 2024-11-22 RX ADMIN — PANTOPRAZOLE SODIUM 40 MG: 40 TABLET, DELAYED RELEASE ORAL at 17:42

## 2024-11-22 RX ADMIN — METOPROLOL TARTRATE 200 MG: 100 TABLET, FILM COATED ORAL at 11:21

## 2024-11-22 RX ADMIN — Medication 800 MG: at 11:17

## 2024-11-22 RX ADMIN — SODIUM CHLORIDE 1 G: 1 TABLET ORAL at 11:17

## 2024-11-22 RX ADMIN — APIXABAN 5 MG: 5 TABLET, FILM COATED ORAL at 17:42

## 2024-11-22 RX ADMIN — MAGNESIUM SULFATE HEPTAHYDRATE 4 G: 40 INJECTION, SOLUTION INTRAVENOUS at 08:36

## 2024-11-22 RX ADMIN — DIGOXIN 125 MCG: 125 TABLET ORAL at 11:17

## 2024-11-22 RX ADMIN — THIAMINE HCL TAB 100 MG 100 MG: 100 TAB at 11:17

## 2024-11-22 RX ADMIN — Medication 6 MG: at 22:27

## 2024-11-22 RX ADMIN — TAMSULOSIN HYDROCHLORIDE 0.4 MG: 0.4 CAPSULE ORAL at 11:17

## 2024-11-22 RX ADMIN — METOPROLOL TARTRATE 200 MG: 100 TABLET, FILM COATED ORAL at 22:32

## 2024-11-22 RX ADMIN — LEVALBUTEROL HYDROCHLORIDE 1.25 MG: 1.25 SOLUTION RESPIRATORY (INHALATION) at 01:51

## 2024-11-22 RX ADMIN — Medication 800 MG: at 17:42

## 2024-11-22 RX ADMIN — TRIMETHOBENZAMIDE HYDROCHLORIDE 200 MG: 100 INJECTION INTRAMUSCULAR at 07:43

## 2024-11-22 RX ADMIN — Medication 4 ML: at 01:51

## 2024-11-22 RX ADMIN — APIXABAN 5 MG: 5 TABLET, FILM COATED ORAL at 11:17

## 2024-11-22 RX ADMIN — FERROUS SULFATE TAB 325 MG (65 MG ELEMENTAL FE) 325 MG: 325 (65 FE) TAB at 11:17

## 2024-11-22 RX ADMIN — SODIUM CHLORIDE 1 G: 1 TABLET ORAL at 17:42

## 2024-11-22 RX ADMIN — ONDANSETRON 4 MG: 2 INJECTION INTRAMUSCULAR; INTRAVENOUS at 08:44

## 2024-11-22 RX ADMIN — RANOLAZINE 500 MG: 500 TABLET, FILM COATED, EXTENDED RELEASE ORAL at 11:19

## 2024-11-22 RX ADMIN — PANTOPRAZOLE SODIUM 40 MG: 40 TABLET, DELAYED RELEASE ORAL at 11:21

## 2024-11-22 RX ADMIN — GABAPENTIN 300 MG: 300 CAPSULE ORAL at 11:17

## 2024-11-22 RX ADMIN — RANOLAZINE 500 MG: 500 TABLET, FILM COATED, EXTENDED RELEASE ORAL at 22:27

## 2024-11-22 RX ADMIN — FOLIC ACID 1 MG: 1 TABLET ORAL at 11:17

## 2024-11-22 RX ADMIN — ASPIRIN 81 MG CHEWABLE TABLET 81 MG: 81 TABLET CHEWABLE at 11:17

## 2024-11-22 NOTE — ASSESSMENT & PLAN NOTE
Na 130, Cl 89, bicarb 20, BUN 37, Ca 7.6 POA in setting of chronic alcohol abuse and starvation.     Replete as needed  Started on salt tablet 1g BID for chronic hyponatremia 2/2 alcohol abuse

## 2024-11-22 NOTE — PROGRESS NOTES
Progress Note - Family Medicine   Name: Gregg Partida 64 y.o. male I MRN: [id number]  Unit/Bed#: 4 [place] 420-01 I Date of Admission: 11/14/2024   Date of Service: 11/22/2024 I Hospital Day: 8     Assessment & Plan  Atrial fibrillation with RVR (HCC)  Presented to ED with afib RVR, HR > 140 sustained. EKG afib RVR with 's. Patient noting he has not been compliant with home eliquis or lopressor 50 mg BID.    In ED: s/p lopressor 5 mg x 2, cardizem 15 mg x 1  11/15 OVN: Cardizem x2 Lopressor x1    Plan:  - Pt was on Cardizem gtt  - Pt refuses cardioversion.   - trial of adding cardizem 30mg Q6hrs and stopping Cardizem gtt was initiated, then increased to 90mg Q6hrs due to having RVR again.  - s/p digoxin x 2, rate started to come down    - Cardiology consulted, appreciate recs  - Titratable cadizem gtt stopped and started on IV Digoxin loading 125 mcg Q8hrs. HR improved yesterday.  - Pt started to have RVR again overnight, but still asymptomatic  - Continue home Eliquis 5 mg BID   - increased home lopressor 200mg BID  - added PO digoxin 125mcg daily   - Continue to monitor for rate control and BP  - telemetry  - Pt is not tolerating PO today, will observe him one more night   COPD (chronic obstructive pulmonary disease) (HCC)  Chronic, stable, presenting with cough that patient reports is at baseline - noting he is non compliant with home inhalers  Patient received 2 doses of p.o. prednisone 40 mg    Plan:  -continue home Breo  -Xopenex q6hr scheduled  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 disease (HCC)  Chronic, home medications include metformin 500 mg QD. Hba1c 5.2.     Plan:  -Hold metformin   -Insulin sliding scale, hypoglycemia protocol  -Pt is not tolerating PO today. Switched to Clear liquid diet  Thrombocytopenia (HCC) (Resolved: 11/22/2024)  Plt 96 on admission, similar to baseline. Suspect 2/2 chronic alcohol abuse.  Plt 203 today resolved     Plan:  -Continue home ASA  -Monitor for  bleeding  -Continue home eliquis  Electrolyte abnormality  Na 130, Cl 89, bicarb 20, BUN 37, Ca 7.6 POA in setting of chronic alcohol abuse and starvation.     Replete as needed  Started on salt tablet 1g BID for chronic hyponatremia 2/2 alcohol abuse    Chronic heart failure with preserved ejection fraction (HCC)  Chronic, appears hypovolemic on admission - no evidence of acute exacerbation. Home medications include lopressor 50 mg BID and ranexa 500mg BID    BNP 1150 POA  S/P Lasix 20mg IV    Plan:  Cardiology consulted, given IV lasix 40 mg yesterday  Daily weights, I&O's  increased home Lopressor to 200mg BID and continue home Ranexa 500mg BID  Alcohol withdrawal (HCC) (Resolved: 11/22/2024)  Presenting with acute intoxication and noting 4 day binge of significant alcohol use. Hx of several admissions for similar complaints in the past. Unable to assess compliance with home naltrexone     Ethanol on admission: 381  Stopped IVF since Pt started to tolerate diet   Multiple doses of phenobarbital in ICU    Plan:  -CIWA protocol   -Seizure precautions, fall precautions, aspiration precautions  -Tigan PRN for nausea   -continue thiamine, folate  Ambulatory dysfunction  Chronic, noting hx of several falls in setting of chronic alcohol abuse - hematomas noted diffusely on skin. PT/OT ordered    CT head wo contrast: no acute intracranial abnormality  CT C-spine wo contrast: No acute cervical spine fracture or traumatic malalignment.  CT chest/abd/pelvis wo contrast: mildly displaced left lateral 7th rib fracture, no hemothorax/pneumothorax, no acute trauma in abdomen or pelvis     -PT/OT  BPH (benign prostatic hyperplasia)  Chronic, stable    continue home flomax 0.4 mg  Closed fracture of one rib of left side  CT C/A/P: Mildly displaced left lateral seventh rib fracture.     -Pain control with PRN tylenol  -incentive spirometry   -conservative management  Chronic hyponatremia  Chronic hyponatremia likely secondary to  "alcohol use  Sodium 129 11/17, was briefly on 1.8% NSS which was d/c'ed once sodium reached 135  Sodium 129 today    Started on salt tablet 1g BID  Continue to trend   Close I&O  Encephalopathy acute (Resolved: 11/22/2024)  11/15-16: More encephalopathic overnight, likely secondary to alcohol withdrawal. Patient confused, hallucinating. Non-focal. Given multiple doses of ativan per [facility] protocol. Transferred to ICU for repeat phenobarb dosing  Received 130mg of phenobarb, started on precedex  11/17: 65mg of phenobarb  11/18: 65mg phenobarb x2  Remains encephalopathic since admission, oriented to self, year, and place at times.  Goal to wean precedex to off- currently at 1mcg/kg/hr  Consider other etiologies of encephalopathy  Considered CT head, however currently non focal  Infectious workup unremarkable, however concern for possible aspiration this stay   Ammonia 36  Consideration for baseline confusion/agitation given previous hospital notes   Mental status waxing/waning but overall less agitated  Mentation improving and tolerating diet.  Downgraded to med surg    Neuro checks  CIWA protocol  Transaminitis (Resolved: 11/22/2024)  Increasing transaminitis 11/16, most likely related to alcoholic hepatitis  11/18 RUQ US- Hepatomegaly and hepatic steatosis. Circumferential gallbladder wall thickening most likely related to underlying liver disease and/or hypoproteinemia. Small right hepatic cyst.  LFTs now downtrending again, no abdominal pain on exam    -trend LFTs  -resolved  Acute respiratory insufficiency (Resolved: 11/22/2024)  Pt was on 4L NC  in ICU  11/17 CXR \"Persistent decreased mild pulmonary edema\"     Continue nebs  Hold steroids given no wheezing on exam   Pulmonary hygiene  Aspiration precautions  Failed speech eval 11/18 and required frequent NT suctioning, now NPO  11/19 passed speech eval and stared on diet, tolerating well.  Pt is currently on RA, resolved   Continue chest oscillator and Acapella to " help bring up the mucus    High anion gap metabolic acidosis (Resolved: 11/16/2024)  Suspect related to starvation ketosis     Resolved after IVF  Acute kidney injury superimposed on chronic kidney disease  (HCC) (Resolved: 11/16/2024)  Cr 1.60 on admission, baseline 1 -1.2. Suspect secondary to dehydration, starvation acidosis, significant alcohol abuse.    UA: neg    ENMA resolved, Cr. 0.77 after IVF    VTE Pharmacologic Prophylaxis: VTE Score: 3 Moderate Risk (Score 3-4) - Pharmacological DVT Prophylaxis Ordered: apixaban (Eliquis).    Mobility:   Basic Mobility Inpatient Raw Score: 16  JH-HLM Goal: 5: Stand one or more mins  JH-HLM Achieved: 4: Move to chair/commode  JH-HLM Goal NOT achieved. Continue with multidisciplinary rounding and encourage appropriate mobility to improve upon JH-HLM goals.    Patient Centered Rounds: I performed bedside rounds with nursing staff today.   Discussions with Specialists or Other Care Team Provider: Cardio    Education and Discussions with Family / Patient: Patient declined call to .     Current Length of Stay: 8 day(s)  Current Patient Status: Inpatient   Certification Statement: The patient will continue to require additional inpatient hospital stay due to not tolerating oral diet  Discharge Plan: Anticipate discharge in 24-48 hrs to pending PT/OT Eval    Code Status: Level 1 - Full Code    Subjective   Pt was seen and examined at the bedside, NAD. Pt is nauseous this morning and not tolerating PO. Pt has not vomited yet. Pt did not have any other acute complaints. Pt had good BM and urination today.      Objective :  Temp:  [96.7 °F (35.9 °C)-98.3 °F (36.8 °C)] 98.3 °F (36.8 °C)  HR:  [] 159  BP: (119-174)/() 159/119  Resp:  [12-21] 17  SpO2:  [90 %-99 %] 94 %  O2 Device: None (Room air)    Body mass index is 24.86 kg/m².     Input and Output Summary (last 24 hours):     Intake/Output Summary (Last 24 hours) at 11/22/2024 1008  Last data filed at  11/22/2024 0425  Gross per 24 hour   Intake 720 ml   Output 2200 ml   Net -1480 ml       Physical Exam  Vitals reviewed.   Constitutional:       General: He is not in acute distress.     Appearance: Normal appearance. He is not ill-appearing.   HENT:      Head: Normocephalic and atraumatic.      Right Ear: External ear normal.      Left Ear: External ear normal.      Nose: Nose normal. No congestion or rhinorrhea.      Mouth/Throat:      Mouth: Mucous membranes are moist.      Pharynx: Oropharynx is clear. No oropharyngeal exudate or posterior oropharyngeal erythema.   Eyes:      General:         Right eye: No discharge.         Left eye: No discharge.      Conjunctiva/sclera: Conjunctivae normal.   Cardiovascular:      Rate and Rhythm: Tachycardia present. Rhythm irregular.      Pulses: Normal pulses.      Heart sounds: Normal heart sounds. No murmur heard.     No friction rub. No gallop.   Pulmonary:      Effort: Pulmonary effort is normal. No respiratory distress.      Breath sounds: Normal breath sounds. No stridor. No wheezing, rhonchi or rales.   Chest:      Chest wall: No tenderness.   Abdominal:      General: Abdomen is flat. Bowel sounds are normal. There is no distension.      Palpations: Abdomen is soft.      Tenderness: There is no abdominal tenderness. There is no guarding.   Musculoskeletal:         General: No swelling, tenderness, deformity or signs of injury. Normal range of motion.      Cervical back: Normal range of motion and neck supple. No rigidity or tenderness.      Right lower leg: No edema.      Left lower leg: No edema.   Lymphadenopathy:      Cervical: No cervical adenopathy.   Skin:     General: Skin is warm and dry.      Capillary Refill: Capillary refill takes less than 2 seconds.      Findings: No bruising, erythema, lesion or rash.   Neurological:      General: No focal deficit present.      Mental Status: He is alert and oriented to person, place, and time. Mental status is at  baseline.      Motor: No weakness.      Gait: Gait normal.      Deep Tendon Reflexes: Reflexes normal.   Psychiatric:         Mood and Affect: Mood normal.         Behavior: Behavior normal.         Thought Content: Thought content normal.           Lines/Drains:        Telemetry:  Telemetry Orders (From admission, onward)               24 Hour Telemetry Monitoring  Continuous x 24 Hours (Telem)        Question:  Reason for 24 Hour Telemetry  Answer:  Arrhythmias requiring acute medical intervention / PPM or ICD malfunction                     Telemetry Reviewed: Atrial flutter. HR averaging 100  Indication for Continued Telemetry Use: Arrthymias requiring medical therapy               Lab Results: I have reviewed the following results:   Results from last 7 days   Lab Units 11/22/24  0424 11/21/24  0529 11/20/24  0622   WBC Thousand/uL 6.59 6.04 7.07   HEMOGLOBIN g/dL 9.7* 9.7* 9.3*   HEMATOCRIT % 29.7* 29.6* 28.0*   PLATELETS Thousands/uL 203 182 154   SEGS PCT %  --   --  65   LYMPHO PCT %  --  14 13*   MONO PCT %  --  14* 18*   EOS PCT %  --  1 2     Results from last 7 days   Lab Units 11/22/24  0424   SODIUM mmol/L 129*   POTASSIUM mmol/L 4.1   CHLORIDE mmol/L 96   CO2 mmol/L 29   BUN mg/dL 16   CREATININE mg/dL 0.89   ANION GAP mmol/L 4   CALCIUM mg/dL 7.9*   ALBUMIN g/dL 3.0*   TOTAL BILIRUBIN mg/dL 0.40   ALK PHOS U/L 98   ALT U/L 36   AST U/L 22   GLUCOSE RANDOM mg/dL 138     Results from last 7 days   Lab Units 11/18/24  0526   INR  1.24*     Results from last 7 days   Lab Units 11/22/24  0727 11/21/24  2100 11/21/24  1518 11/21/24  1058 11/21/24  0651 11/20/24  1956 11/20/24  1558 11/20/24  1102 11/20/24  0703 11/19/24  2010 11/19/24  1551 11/18/24  2019   POC GLUCOSE mg/dl 101 156* 169* 129 102 127 141* 112 99 129 105 129         Results from last 7 days   Lab Units 11/17/24  0526   PROCALCITONIN ng/ml 0.10       Recent Cultures (last 7 days):   Results from last 7 days   Lab Units 11/16/24  1126  11/16/24  1719   BLOOD CULTURE  No Growth After 5 Days. No Growth After 5 Days.       Imaging Results Review: No pertinent imaging studies reviewed.  Other Study Results Review: No additional pertinent studies reviewed.    Last 24 Hours Medication List:     Current Facility-Administered Medications:     acetaminophen (TYLENOL) tablet 650 mg, Q6H PRN    apixaban (ELIQUIS) tablet 5 mg, BID    Artificial Tears Op Soln 1 drop, Q6H PRN    aspirin chewable tablet 81 mg, Daily    digoxin (LANOXIN) tablet 125 mcg, Daily    ferrous sulfate tablet 325 mg, Daily With Breakfast    fluticasone-vilanterol 200-25 mcg/actuation 1 puff, Daily    folic acid (FOLVITE) tablet 1 mg, Daily    gabapentin (NEURONTIN) capsule 300 mg, Daily    insulin lispro (HumALOG/ADMELOG) 100 units/mL subcutaneous injection 1-5 Units, HS    insulin lispro (HumALOG/ADMELOG) 100 units/mL subcutaneous injection 1-6 Units, TID AC **AND** Fingerstick Glucose (POCT), 4x Daily AC and at bedtime    labetalol (NORMODYNE) injection 10 mg, Q4H PRN    levalbuterol (XOPENEX) inhalation solution 1.25 mg, Q6H    magnesium Oxide (MAG-OX) tablet 800 mg, BID    magnesium sulfate 4 g/100 mL IVPB (premix) 4 g, Once, Last Rate: 4 g (11/22/24 0836)    melatonin tablet 6 mg, HS    metoprolol tartrate (LOPRESSOR) tablet 100 mg, TID    nicotine (NICODERM CQ) 14 mg/24hr TD 24 hr patch 1 patch, Daily    pantoprazole (PROTONIX) EC tablet 40 mg, BID AC    ranolazine (RANEXA) 12 hr tablet 500 mg, Q12H    senna-docusate sodium (SENOKOT S) 8.6-50 mg per tablet 1 tablet, Daily PRN    sodium chloride 3 % inhalation solution 4 mL, Q6H    sodium chloride tablet 1 g, BID With Meals    tamsulosin (FLOMAX) capsule 0.4 mg, Daily    thiamine tablet 100 mg, Daily    trimethobenzamide (TIGAN) IM injection 200 mg, Q6H PRN    Administrative Statements   Today, Patient Was Seen By: Pablito Presley MD      **Please Note: This note may have been constructed using a voice recognition system.**

## 2024-11-22 NOTE — OCCUPATIONAL THERAPY NOTE
"  Occupational Therapy Progress Note     Patient Name: Gregg Partida  Today's Date: 11/22/2024  Problem List  Principal Problem:    Atrial fibrillation with RVR (HCC)  Active Problems:    COPD (chronic obstructive pulmonary disease) (HCC)    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 disease (HCC)    Electrolyte abnormality    Chronic heart failure with preserved ejection fraction (HCC)    Ambulatory dysfunction    BPH (benign prostatic hyperplasia)    Closed fracture of one rib of left side    Chronic hyponatremia       11/22/24 1546   OT Last Visit   OT Visit Date 11/22/24  (Friday)   Note Type   Note Type Treatment  (tx session [id number])   Pain Assessment   Pain Assessment Tool 0-10   Pain Score No Pain   Restrictions/Precautions   Weight Bearing Precautions Per Order No   Other Precautions Chair Alarm;Bed Alarm;Telemetry;Fall Risk;Fluid restriction  (Balwinder, room air, 1800ML fluid restriction)   Lifestyle   Autonomy Pt reports I w/ ADLs using RW   Reciprocal Relationships Pt reports living alone in apt. Supportive, local son   Service to Others Pt reports retired    ADL   Where Assessed Edge of bed   Eating Assistance 6  Modified independent   Eating Deficit Setup   Eating Comments Demo UE strength / coordination to feed self. \"I did not even order anything to eat yet\" (stated upon sitting at EOB)   Grooming Assistance 5  Supervision/Setup   Grooming Deficit Setup;Supervision/safety;Increased time to complete   Grooming Comments seated at EOB to comb hair after set- up w/ encouragemnent to actively participate   UB Dressing Assistance 5  Supervision/Setup   UB Dressing Deficit Setup;Supervision/safety;Verbal cueing;Increased time to complete   UB Dressing Comments cues due to multiple lines   LB Dressing Assistance 5  Supervision/Setup   LB Dressing Deficit Setup;Steadying;Increased time to complete;Verbal cueing   LB Dressing Comments seated to thread LE into pants / underwear; required + " "time and min A/ cues to manage   on pants due to multiple lines   Toileting Comments denied need to void   Bed Mobility   Supine to Sit 5  Supervision   Additional items Assist x 1;Increased time required;Bedrails  (L sidelying to sit at EOB)   Sit to Supine Unable to assess   Additional Comments Pt walking w/ Karina DICKSON at end of tx session   Transfers   Sit to Stand 5  Supervision   Additional items Assist x 1;Increased time required   Stand to Sit 5  Supervision   Additional items Assist x 1;Increased time required   Additional Comments Pt performed sit <> stand 2 X from EOB   Functional Mobility   Functional Mobility 4  Minimal assistance   Additional Comments Please see PT note for details. Pt engaged in short distance functional mobility using RW w/ min A (CG / steadying) to door   Additional items Rolling walker   Subjective   Subjective \"I have not done anything about that yet\" ( stated when asked if he initiated outpt program to work on get his license back)   Cognition   Overall Cognitive Status Impaired  (decreased activity tolerance, engagement and questionable insight into deficits)   Arousal/Participation Arousable;Cooperative  (Drowsy upon therapist arrival, improved attention, arousal upon sitting at EOB)   Attention Attends with cues to redirect   Orientation Level Oriented to person;Oriented to place  (generally oriented to time. \"My son's birthday is this month on the 30th\")   Memory Decreased recall of recent events  (details, timeline recent events)   Following Commands Follows multistep commands with increased time or repetition   Comments Identified pt by full name and birthdate. Pt prefers \"Elijah\". Pt required max encouragement to challenge activity tolerance, actively participate in ADLs   Activity Tolerance   Activity Tolerance Patient limited by fatigue   Medical Staff Made Aware spoke w/ RN and Karina DICKSON   Assessment   Assessment Pt seen for skilled OT tx session focusing on " activity engagement, challenging activity tolerance, ongoing eval and pt education. Pt agreeable to participate w/ max encouragement. Pt resting /sleepong upon therapist arrival w/ door closed and curtains closed. Pt required less physical assistance to participate in ADLs and performed LBD w/ S exlcuding fasteners. Pt performed sit <> stand w/ S. Continue to recommend level III rehab resource intensity when medically stable for discharge from acute care. Will continue to follow   Plan   Treatment Interventions ADL retraining;Functional transfer training;Endurance training;Patient/family training;Equipment evaluation/education;Compensatory technique education;Continued evaluation;Energy conservation;Activityengagement   Goal Expiration Date 11/29/24   OT Treatment Day 1  (Friday 11/22/24)   OT Frequency 3-5x/wk   Discharge Recommendation   Rehab Resource Intensity Level, OT III (Minimum Resource Intensity)   Equipment Recommended Shower/Tub chair with back ($)   AM-PAC Daily Activity Inpatient   Lower Body Dressing 3   Bathing 3   Toileting 3   Upper Body Dressing 4   Grooming 4   Eating 4   Daily Activity Raw Score 21   Daily Activity Standardized Score (Calc for Raw Score >=11) 44.27   AM-PAC Applied Cognition Inpatient   Following a Speech/Presentation 3   Understanding Ordinary Conversation 4   Taking Medications 3   Remembering Where Things Are Placed or Put Away 4   Remembering List of 4-5 Errands 4   Taking Care of Complicated Tasks 3   Applied Cognition Raw Score 21   Applied Cognition Standardized Score 44.3   Barthel Index   Feeding 10   Bathing 0   Grooming Score 5   Dressing Score 5   Bladder Score 10   Bowels Score 10   Toilet Use Score 5   Transfers (Bed/Chair) Score 10   Mobility (Level Surface) Score 10   Stairs Score 0   Barthel Index Score 65   End of Consult   Education Provided Yes   Licensure   [place] License Number  Ilene Trujillo OTR/L [id number]          The patient's raw score on the  AM-PAC Daily Activity Inpatient Short Form is 21. A raw score of greater than or equal to 19 suggests the patient may benefit from discharge to home. Please refer to the recommendation of the Occupational Therapist for safe discharge planning.      Ilene Trujillo OTR/SONI  [id number]  [id number]

## 2024-11-22 NOTE — ASSESSMENT & PLAN NOTE
CT C/A/P: Mildly displaced left lateral seventh rib fracture.     -Pain control with PRN tylenol  -incentive spirometry   -conservative management

## 2024-11-22 NOTE — PLAN OF CARE
Problem: OCCUPATIONAL THERAPY ADULT  Goal: Performs self-care activities at highest level of function for planned discharge setting.  See evaluation for individualized goals.  Description: Treatment Interventions: ADL retraining, Functional transfer training, UE strengthening/ROM, Endurance training, Patient/family training, Equipment evaluation/education, Activityengagement, Compensatory technique education          See flowsheet documentation for full assessment, interventions and recommendations.   Outcome: Progressing  Note: Limitation: Decreased ADL status, Decreased UE strength, Decreased Safe judgement during ADL, Decreased endurance, Decreased self-care trans, Decreased high-level ADLs (decreased balance and mobility)  Prognosis: Good  Assessment: Pt seen for skilled OT tx session focusing on activity engagement, challenging activity tolerance, ongoing eval and pt education. Pt agreeable to participate w/ max encouragement. Pt resting /sleepong upon therapist arrival w/ door closed and curtains closed. Pt required less physical assistance to participate in ADLs and performed LBD w/ S exlcuding fasteners. Pt performed sit <> stand w/ S. Continue to recommend level III rehab resource intensity when medically stable for discharge from acute care. Will continue to follow     Rehab Resource Intensity Level, OT: III (Minimum Resource Intensity)

## 2024-11-22 NOTE — PROGRESS NOTES
Progress Note - Cardiology   Name: Gregg Partida 64 y.o. male I MRN: [id number]  Unit/Bed#: 4 [place] 420-01 I Date of Admission: 11/14/2024   Date of Service: 11/22/2024 I Hospital Day: 8    Assessment & Plan  Atrial fibrillation with RVR (HCC)  patient with history of recurrent atrial fibrillation  8/16/2024 TTE: EF visibly estimated 65% with severe dilatation of left atrium  unclear last time patient took his medications secondary to intoxication  telemetry demonstrates rapid atrial fibrillation rates 130s to 160s  patient started on Cardizem drip 11/15/2024  discuss with patient possible cardioversion, he differs and declines procedure at this time.  Patient is been on maximum dose of Cardizem without improvement of heart rate. Doubtful patient will successfully cardioversion due to the severe dilatation of his left atrium.  Overnight 11/19/2024 patient received two doses of IV digoxin 0.25 mg which seem to improve heart rate. Completed digoxin load on 11/20/2024  will start digoxin 0.125 mg daily starting 11/21/2024  increase Lopressor to 200 mg BID   Continue Eliquis 5 mg bid  Will follow rate control strategy  Chronic heart failure with preserved ejection fraction (HCC)  Wt Readings from Last 3 Encounters:   11/22/24 87.8 kg (193 lb 9.6 oz)   09/19/24 86.2 kg (190 lb 1.6 oz)   09/16/24 83.3 kg (183 lb 10.3 oz)   8/16/2024 TTE: EF visibly estimated 65% with severe left atrial dilatation  BNP 1150  continue PRN diuretics  increase Lopressor to 200 mg BID  monitor I and O, daily weights and labs  CHF education          COPD (chronic obstructive pulmonary disease) ([facility])  continue current inhaler regimen per primary team  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 disease ([facility])  Lab Results   Component Value Date    HGBA1C 5.2 11/14/2024       Recent Labs     11/21/24  1058 11/21/24  1518 11/21/24  2100 11/22/24  0727   POCGLU 129 169* 156* 101       Blood Sugar Average: Last 72 hrs:  (P)  [id number]  managed per primary team    Subjective   Chief Complaint: nonspecific. Patient back in rapid atrial fibrillation rates 150s. Will increase Lopressor to 200 mg BID. Discussed with nurse to please give him his morning medication at this time.      Objective :  Temp:  [96.7 °F (35.9 °C)-98.3 °F (36.8 °C)] 98.3 °F (36.8 °C)  HR:  [] 159  BP: (119-174)/() 159/119  Resp:  [12-21] 17  SpO2:  [90 %-99 %] 94 %  O2 Device: None (Room air)  Orthostatic Blood Pressures      Flowsheet Row Most Recent Value   Blood Pressure 159/119  [MD made aware stated to give metroplol] filed at 11/22/2024 0748   Patient Position - Orthostatic VS Lying filed at 11/21/2024 1500          First Weight: Weight - Scale: 84.3 kg (185 lb 13.6 oz) (11/14/24 0900)  Vitals:    11/21/24 0531 11/22/24 0531   Weight: 87.4 kg (192 lb 10.9 oz) 87.8 kg (193 lb 9.6 oz)     Physical Exam  Vitals and nursing note reviewed.   Constitutional:       Appearance: Normal appearance. He is normal weight. He is ill-appearing (Chronically).   HENT:      Right Ear: External ear normal.      Left Ear: External ear normal.   Eyes:      General: No scleral icterus.        Right eye: No discharge.         Left eye: No discharge.   Cardiovascular:      Rate and Rhythm: Regular rhythm. Tachycardia present.      Pulses: Normal pulses.      Heart sounds: Murmur heard.   Pulmonary:      Effort: Pulmonary effort is normal. No respiratory distress.      Breath sounds: Normal breath sounds.   Abdominal:      General: Bowel sounds are normal. There is no distension.      Palpations: Abdomen is soft.   Musculoskeletal:      Right lower leg: No edema.      Left lower leg: No edema.   Skin:     General: Skin is warm and dry.      Capillary Refill: Capillary refill takes less than 2 seconds.   Neurological:      General: No focal deficit present.      Mental Status: He is alert. Mental status is at baseline.   Psychiatric:         Mood and Affect: Mood  normal.           Lab Results: I have reviewed the following results:  Results from last 7 days   Lab Units 11/22/24  0424 11/21/24  0529 11/20/24  0622   WBC Thousand/uL 6.59 6.04 7.07   HEMOGLOBIN g/dL 9.7* 9.7* 9.3*   HEMATOCRIT % 29.7* 29.6* 28.0*   PLATELETS Thousands/uL 203 182 154     Results from last 7 days   Lab Units 11/22/24  0424 11/21/24  0529 11/20/24  0622   POTASSIUM mmol/L 4.1 4.2 3.7   CHLORIDE mmol/L 96 93* 95*   CO2 mmol/L 29 28 29   BUN mg/dL 16 12 12   CREATININE mg/dL 0.89 0.82 0.74   CALCIUM mg/dL 7.9* 8.1* 7.8*     Results from last 7 days   Lab Units 11/18/24  0526 11/16/24  1719   INR  1.24* 1.07     Lab Results   Component Value Date    HGBA1C 5.2 11/14/2024     Lab Results   Component Value Date    CKTOTAL 258 09/14/2024    CKMB 2.0 04/22/2021    CKMBINDEX <1.0 04/22/2021    TROPONINI <0.02 06/25/2021     Telemetry: atrial flutter with rapid ventricular response    VTE Pharmacologic Prophylaxis: VTE covered by:  apixaban, Oral, 5 mg at 11/21/24 1700     VTE Mechanical Prophylaxis: sequential compression device    Abigail TRAN  Cardiology

## 2024-11-22 NOTE — ASSESSMENT & PLAN NOTE
Increasing transaminitis 11/16, most likely related to alcoholic hepatitis  11/18 RUQ US- Hepatomegaly and hepatic steatosis. Circumferential gallbladder wall thickening most likely related to underlying liver disease and/or hypoproteinemia. Small right hepatic cyst.  LFTs now downtrending again, no abdominal pain on exam    -trend LFTs  -resolved

## 2024-11-22 NOTE — CASE MANAGEMENT
Case Management Progress Note    Patient name Gregg Partida  Location 4 [place] 420/[address] [place] 420-* MRN [id number]  : 1960 Date 2024       LOS (days): 8  Geometric Mean LOS (GMLOS) (days): 3.4  Days to GMLOS:-5        OBJECTIVE:     Current admission status: Inpatient  Preferred Pharmacy:   [facility] [address] [address]  Phone: [phone] Fax: [phone]    [facility] [address] [place] [address] [address]  [address]  [facility] [address]  Phone: [phone] Fax: [phone]    Primary Care Provider: Lilliana Bliss MD    [facility]  Secondary Insurance:     PROGRESS NOTE:    SW attempted three times to meet with patient to complete IMM#2 and continue discussion on discharge plan. Patient noted to be sleeping restfully all day. Nursing aware and advised SW not to wake patient per his request.

## 2024-11-22 NOTE — ASSESSMENT & PLAN NOTE
Plt 96 on admission, similar to baseline. Suspect 2/2 chronic alcohol abuse.  Plt 203 today resolved     Plan:  -Continue home ASA  -Monitor for bleeding  -Continue home eliquis

## 2024-11-22 NOTE — ASSESSMENT & PLAN NOTE
patient with history of recurrent atrial fibrillation  8/16/2024 TTE: EF visibly estimated 65% with severe dilatation of left atrium  unclear last time patient took his medications secondary to intoxication  telemetry demonstrates rapid atrial fibrillation rates 130s to 160s  patient started on Cardizem drip 11/15/2024  discuss with patient possible cardioversion, he differs and declines procedure at this time.  Patient is been on maximum dose of Cardizem without improvement of heart rate. Doubtful patient will successfully cardioversion due to the severe dilatation of his left atrium.  Overnight 11/19/2024 patient received two doses of IV digoxin 0.25 mg which seem to improve heart rate. Completed digoxin load on 11/20/2024  will start digoxin 0.125 mg daily starting 11/21/2024  increase Lopressor to 200 mg BID   Continue Eliquis 5 mg bid  Will follow rate control strategy

## 2024-11-22 NOTE — DISCHARGE SUMMARY
Discharge Summary - Family Medicine   Name: Gregg Partida 64 y.o. male I MRN: [id number]  Unit/Bed#: 4 [place] 420-01 I Date of Admission: 11/14/2024   Date of Service: 11/23/2024 I Hospital Day: 9    Medical Problems       Resolved Problems  Date Reviewed: 11/19/2024          Resolved    Thrombocytopenia (HCC) 11/22/2024     Resolved by  Pablito Presley MD    High anion gap metabolic acidosis 11/16/2024     Resolved by  CHELSEA Christopher    Encephalopathy acute 11/22/2024     Resolved by  Pablito Presley MD    Transaminitis 11/22/2024     Resolved by  Pablito Presley MD    Acute kidney injury superimposed on chronic kidney disease  (HCC) 11/16/2024     Resolved by  CHELSEA Christopher    Alcohol withdrawal (HCC) 11/22/2024     Resolved by  Pablito Presley MD    Acute respiratory insufficiency 11/22/2024     Resolved by  Pablito Presley MD        Discharging Physician / Practitioner: Pamela Esquivel MD  PCP: Lilliana Bliss MD  Admission Date:   Admission Orders (From admission, onward)       Ordered        11/14/24 0433  INPATIENT ADMISSION  Once                          Discharge Date: 11/23/24    Consultations During Hospital Stay:  ICU. Cardio    Procedures Performed:   none    Significant Findings / Test Results:   11/23: Na 129, Mg 1.8, K 4.2, Hgb 9.9  11/22: Na 129, Mg 1.6, Hgb 9.7  11/21: Na 128, Mag 1.4, BC no growth @ 4 days, Alk phos 110, Alb 3.0, Hb 9.7  11/20:  ALT 55, AST 42, Hgb 9.3, Mg 1.5  11/19: ALT 65, AST 52, , Hgb 10,   11/18: ALT 71, AST 63, , Hgb 10.5  11/17:   11/15: Na 131, BNP 1150, Hgb 10.4, WBC 6.14, Cr 0.91  11/14 @ 12am: Na 130, Cl 89, CO2 20, AG 21, BUN 37, Cr 1.60, Ca 7.6, AST 82, , 0h troponin 38, WBC wnl, Hgb 12.6, PLT 96, PT 15.1, INR 1.13, EtOH 381  11/14 @ 2am: Na 130, AG 17, BUN 35, Cr 1.44, Mg 1.1, ionized Ca 0.85    -CT head 11/14: No acute intracranial abnormality.  -CT cervical spine 11/14: No acute cervical spine fracture or  malalignment.  -CT chest/abdomen 11/14: Mildly displaced left lateral seventh rib fracture, no acute trauma  -RUQ US: 1. Hepatomegaly and hepatic steatosis. 2. Circumferential gallbladder wall thickening most likely related to underlying liver disease and/or hypoproteinemia. 3. Small right hepatic cyst. 4. Right pleural effusion.    Incidental Findings:   none     Test Results Pending at Discharge (will require follow up):   none     Outpatient Tests Requested:  BMP in one week    Complications:  none    Reason for Admission: alcohol intoxication, afib with RVE    Things to follow up on:   Are you taking your digoxin?  Are you taking your metoprolol?  Have you followed up with cardiology?  Are you taking your salt tablets?  Have you repeated your BMP?    Hospital Course:   Gregg Partida is a 64 y.o. male patient who originally presented to the hospital on 11/14/2024 due to alcohol intoxication and afib with RVR    Gregg Partida is a 64-year-old male with PMH of A.fib w/ RVR, alcohol abuse, COPD, T2DM w/ CKD, CHF, BPH who presented to the hospital on 11/14/2024 after being found with acute alcohol intoxication and A.fib w/ RVR and was admitted to ICU for management of afib, electrolyte abnormalities and CIWA monitoring. Patient was transferred to [facility], but was returned to ICU due acute encephalopathy secondary to ETOH withdrawal and increased ativan requirement. Patient was placed on telemetry, attempted rate control with Cardizem drip, and cardiology was consulted. Patient declined offer for cardioversion by cardiology. Improved mentation was achieved after several doses of phenobarb and patient was deemed appropriate for transfer to [facility]. Despite max dose cardizem drip patient remained in persistent a-fib and was transitioned to digoxin loading and subsequently achieved rate control with daily digoxin and increased dose of metoprolol. Patient has remained afebrile and hemodynamically stable for  discharge. Pt was discharged with increased metoprolol of 200 mg BID and new digoxin 125mcg daily. Pt was advised with alcohol cessation and follow up with PCP and cardiology.  Patient now tolerating diet and vitally stable and ready for discharge.              Atrial fibrillation with RVR (HCC)  Presented to ED with afib RVR, HR > 140 sustained. EKG afib RVR with 's. Patient noting he has not been compliant with home eliquis or lopressor 50 mg BID.  In ED: s/p lopressor 5 mg x 2, cardizem 15 mg x 1.Patient intermittently required Cardizem drip.  He refused cardioversion.     -Cardiology consulted.  Recommended outpatient follow-up.  -Continue home Eliquis 5 mg BID   -Continue lopressor 200mg BID  -Continue PO digoxin 125mcg daily     COPD (chronic obstructive pulmonary disease) (HCC)  Chronic, stable, presenting with cough that patient reports is at baseline. He is non compliant with home inhalers  Patient received 2 doses of p.o. prednisone 40 mg in the ED.     Plan:  -continue home Breo  -Continue albuterol as needed    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 disease (HCC)  Chronic, home medications include metformin 500 mg QD. Hba1c 5.2.  Blood sugars were well-controlled on insulin sliding scale.     Plan:  -Restart home metformin    Thrombocytopenia (HCC) (Resolved: 11/22/2024)  Plt 96 on admission, similar to baseline. Suspect 2/2 chronic alcohol abuse.     Plan:  -Continue home ASA and Eliquis    Electrolyte abnormality  Admission Na 130, Cl 89, bicarb 20, BUN 37, Ca 7.6 POA in setting of chronic alcohol abuse and starvation.      Continue salt tablet 2 g twice daily  Continue magnesium 800 mg twice daily.    Chronic heart failure with preserved ejection fraction (HCC)  Chronic, appears hypovolemic on admission - no evidence of acute exacerbation. Home medications include lopressor 50 mg BID and ranexa 500mg BID.  BNP 1150 POA.     Plan:  Continue daily weights  Home Lopressor increased  to 200 mg BID   Continue home Ranexa 500mg BID    Alcohol withdrawal (HCC) (Resolved: 11/22/2024)  Presented with acute intoxication and noting 4 day binge of significant alcohol use. Hx of several admissions for similar complaints in the past.  Noncompliant with home naltrexone.  Ethanol 381 on admission.  Patient required multiple doses of Ativan during admission.    Recommended alcohol cessation    Ambulatory dysfunction  Chronic, noting hx of several falls in setting of chronic alcohol abuse - hematomas noted diffusely on skin. PT/OT ordered     CT head wo contrast: no acute intracranial abnormality  CT C-spine wo contrast: No acute cervical spine fracture or traumatic malalignment.  CT chest/abd/pelvis wo contrast: mildly displaced left lateral 7th rib fracture, no hemothorax/pneumothorax, no acute trauma in abdomen or pelvis      Continue PT outpatient    BPH (benign prostatic hyperplasia)  Chronic, stable     continue home flomax 0.4 mg    Closed fracture of one rib of left side  CT C/A/P: Mildly displaced left lateral seventh rib fracture.      Chronic hyponatremia  Chronic hyponatremia likely secondary to alcohol use    Continue salt tablets 2 g twice daily    Encephalopathy acute (Resolved: 11/22/2024)  Presented with acute encephalopathy likely secondary to alcohol withdrawal.  Received phenobarbital in ICU then multiple doses of Ativan while on the floor.  Mentation improved throughout this hospitalization.     Transaminitis (Resolved: 11/22/2024)  Increasing transaminitis 11/16, most likely related to alcoholic hepatitis  11/18 RUQ US- Hepatomegaly and hepatic steatosis. Circumferential gallbladder wall thickening most likely related to underlying liver disease and/or hypoproteinemia. Small right hepatic cyst.  LFTs now downtrending again, no abdominal pain on exam    Acute respiratory insufficiency (Resolved: 11/22/2024)  Pt was on 4L NC  in ICU.  Saturating adequately on room air.  11/17 CXR  "\"Persistent decreased mild pulmonary edema\"      High anion gap metabolic acidosis (Resolved: 11/16/2024)  Suspect related to starvation ketosis      Resolved after IVF    Acute kidney injury superimposed on chronic kidney disease  (HCC) (Resolved: 11/16/2024)  Cr 1.60 on admission, baseline 1 -1.2. Suspect secondary to dehydration, starvation acidosis, significant alcohol abuse.     UA: neg     ENMA resolved, Cr. 0.77 after IVF    Please see above list of diagnoses and related plan for additional information.     Condition at Discharge: stable    Discharge Day Visit / Exam:   Subjective: Pt was seen and examined at the bedside, NAD. Pt is still tired, but Pt did not have any other acute complaints. Pt had BM today and urinating well    Vitals: Blood Pressure: (!) 184/93 (11/23/24 1118)  Pulse: (!) 52 (11/23/24 1118)  Temperature: 97.6 °F (36.4 °C) (11/23/24 1114)  Temp Source: Tympanic (11/21/24 1500)  Respirations: 20 (11/23/24 1114)  Height: 6' 2\" (188 cm) (11/14/24 0900)  Weight - Scale: 85.2 kg (187 lb 12.8 oz) (11/23/24 0554)  SpO2: 91 % (11/23/24 1118)  Physical Exam  Vitals reviewed.   Constitutional:       General: He is not in acute distress.     Appearance: Normal appearance. He is not ill-appearing.   HENT:      Head: Normocephalic and atraumatic.      Right Ear: External ear normal.      Left Ear: External ear normal.      Nose: Nose normal. No congestion or rhinorrhea.      Mouth/Throat:      Mouth: Mucous membranes are moist.      Pharynx: Oropharynx is clear. No oropharyngeal exudate or posterior oropharyngeal erythema.   Eyes:      General:         Right eye: No discharge.         Left eye: No discharge.      Conjunctiva/sclera: Conjunctivae normal.   Cardiovascular:      Rate and Rhythm: Tachycardia present. Rhythm irregular.      Pulses: Normal pulses.      Heart sounds: Normal heart sounds. No murmur heard.     No friction rub. No gallop.   Pulmonary:      Effort: Pulmonary effort is normal. No " respiratory distress.      Breath sounds: Normal breath sounds. No stridor. No wheezing, rhonchi or rales.   Chest:      Chest wall: No tenderness.   Abdominal:      General: Abdomen is flat. Bowel sounds are normal. There is no distension.      Palpations: Abdomen is soft.      Tenderness: There is no abdominal tenderness. There is no guarding.   Musculoskeletal:         General: No swelling, tenderness, deformity or signs of injury. Normal range of motion.      Cervical back: Normal range of motion and neck supple. No rigidity or tenderness.      Right lower leg: No edema.      Left lower leg: No edema.   Lymphadenopathy:      Cervical: No cervical adenopathy.   Skin:     General: Skin is warm and dry.      Capillary Refill: Capillary refill takes less than 2 seconds.      Findings: No bruising, erythema, lesion or rash.   Neurological:      General: No focal deficit present.      Mental Status: He is alert and oriented to person, place, and time. Mental status is at baseline.      Motor: No weakness.      Gait: Gait normal.      Deep Tendon Reflexes: Reflexes normal.   Psychiatric:         Mood and Affect: Mood normal.         Behavior: Behavior normal.         Thought Content: Thought content normal.         Discussion with Family: Patient declined call to .     Discharge instructions/Information to patient and family:   See after visit summary for information provided to patient and family.      Provisions for Follow-Up Care:  See after visit summary for information related to follow-up care and any pertinent home health orders.      Mobility at time of Discharge:   Basic Mobility Inpatient Raw Score: 19  JH-HLM Goal: 6: Walk 10 steps or more  JH-HLM Achieved: 8: Walk 250 feet ot more  HLM Goal achieved. Continue to encourage appropriate mobility.     Disposition:   Home    Planned Readmission: none    Discharge Medications:  See after visit summary for reconciled discharge medications provided to  patient and/or family.      Administrative Statements   Discharge Statement:  I have spent a total time of >45 minutes in caring for this patient on the day of the visit/encounter. >30 minutes of time was spent on: Counseling / Coordination of care, Documenting in the medical record, Reviewing / ordering tests, medicine, procedures  , and Communicating with other healthcare professionals .    **Please Note: This note may have been constructed using a voice recognition system**

## 2024-11-22 NOTE — ASSESSMENT & PLAN NOTE
Wt Readings from Last 3 Encounters:   11/22/24 87.8 kg (193 lb 9.6 oz)   09/19/24 86.2 kg (190 lb 1.6 oz)   09/16/24 83.3 kg (183 lb 10.3 oz)   8/16/2024 TTE: EF visibly estimated 65% with severe left atrial dilatation  BNP 1150  continue PRN diuretics  increase Lopressor to 200 mg BID  monitor I and O, daily weights and labs  CHF education

## 2024-11-22 NOTE — PROGRESS NOTES
SWCM completed chart review. SWCM called patient to follow up and assist with needs. SWCM unable to reach patient (x2). Left voice message requesting return call. Contact information provided.     SWCM sent UTR letter. SWCM closed case and removed self from care team.     SWCM will remain available to assist as needed.

## 2024-11-22 NOTE — PHYSICAL THERAPY NOTE
"   PT TREATMENT     11/22/24 2342   PT Last Visit   PT Visit Date 11/22/24   Note Type   Note Type Treatment  (room 420)   Pain Assessment   Pain Assessment Tool 0-10   Pain Score No Pain   Restrictions/Precautions   Weight Bearing Precautions Per Order No   Other Precautions Chair Alarm;Bed Alarm;Fall Risk;Fluid restriction   General   Chart Reviewed Yes   Family/Caregiver Present No   Cognition   Overall Cognitive Status Impaired   Arousal/Participation Cooperative   Attention Attends with cues to redirect   Following Commands Follows multistep commands with increased time or repetition   Subjective   Subjective Pt states \"I just wanna go for a walk\"   Bed Mobility   Additional Comments Pt presents standing at side of bed with OT Ilene   Transfers   Stand to Sit 5  Supervision   Ambulation/Elevation   Gait pattern Foward flexed;Steppage  (steppage with slight circumduction of right foot due to foot drop)   Gait Assistance 4  Minimal assist   Additional items Assist x 1;Verbal cues   Assistive Device Rolling walker   Distance 250 feet   Stair Management Assistance Not tested   Balance   Ambulatory Fair  (with RW)   Activity Tolerance   Activity Tolerance Patient limited by fatigue   Nurse Made Aware yes: Dottie SORIANO  Walked around unit   Assessment   Prognosis Good   Problem List Decreased strength;Decreased endurance;Impaired balance;Decreased mobility;Impaired judgement;Decreased range of motion;Decreased cognition   Assessment Pt agreeable to work with PT on second attempt .  Pt was with OT at start of session.  Pt agreeable to walk in hallway and completed one entire lap around unit with RW and Min A .  Pt returned to room , got back into bed in a left sidelying position and declined further activity / exercises.  Will continue to progress as tolerated.  The patient's AM-PAC Basic Mobility Inpatient Short Form Raw Score is 19. A Raw score of greater than 16 suggests the patient may benefit from discharge to home " with home PT. Please also refer to the recommendation of the Physical Therapist for safe discharge planning.   Goals   Patient Goals to walk   Plan   Treatment/Interventions Functional transfer training;Endurance training;Bed mobility;Gait training;Spoke to nursing;OT   Progress Progressing toward goals   PT Frequency 3-5x/wk   Discharge Recommendation   Rehab Resource Intensity Level, PT III (Minimum Resource Intensity)   AM-PAC Basic Mobility Inpatient   Turning in Flat Bed Without Bedrails 4   Lying on Back to Sitting on Edge of Flat Bed Without Bedrails 4   Moving Bed to Chair 3   Standing Up From Chair Using Arms 3   Walk in Room 3   Climb 3-5 Stairs With Railing 2   Basic Mobility Inpatient Raw Score 19   Basic Mobility Standardized Score 42.48   [facility] Highest Level Of Mobility   -HLM Goal 6: Walk 10 steps or more   JH-HLM Achieved 8: Walk 250 feet ot more   End of Consult   Patient Position at End of Consult Bed/Chair alarm activated;All needs within reach  (pt in left sidelying)   Licensure   [place] License Number  Nereyda Gutierrez PT  [id number]

## 2024-11-22 NOTE — ASSESSMENT & PLAN NOTE
Cr 1.60 on admission, baseline 1 -1.2. Suspect secondary to dehydration, starvation acidosis, significant alcohol abuse.    UA: neg    ENMA resolved, Cr. 0.77 after IVF

## 2024-11-22 NOTE — ASSESSMENT & PLAN NOTE
Lab Results   Component Value Date    HGBA1C 5.2 11/14/2024       Recent Labs     11/21/24  1058 11/21/24  1518 11/21/24  2100 11/22/24  0727   POCGLU 129 169* 156* 101       Blood Sugar Average: Last 72 hrs:  (P) [id number]  managed per primary team

## 2024-11-22 NOTE — ASSESSMENT & PLAN NOTE
Chronic hyponatremia likely secondary to alcohol use  Sodium 129 11/17, was briefly on 1.8% NSS which was d/c'ed once sodium reached 135  Sodium 129 today    Started on salt tablet 1g BID  Continue to trend   Close I&O

## 2024-11-22 NOTE — ASSESSMENT & PLAN NOTE
Chronic, home medications include metformin 500 mg QD. Hba1c 5.2.     Plan:  -Hold metformin   -Insulin sliding scale, hypoglycemia protocol  -Pt is not tolerating PO today. Switched to Clear liquid diet

## 2024-11-22 NOTE — ASSESSMENT & PLAN NOTE
Chronic, appears hypovolemic on admission - no evidence of acute exacerbation. Home medications include lopressor 50 mg BID and ranexa 500mg BID    BNP 1150 POA  S/P Lasix 20mg IV    Plan:  Cardiology consulted, given IV lasix 40 mg yesterday  Daily weights, I&O's  increased home Lopressor to 200mg BID and continue home Ranexa 500mg BID

## 2024-11-22 NOTE — ASSESSMENT & PLAN NOTE
"Pt was on 4L NC  in ICU  11/17 CXR \"Persistent decreased mild pulmonary edema\"     Continue nebs  Hold steroids given no wheezing on exam   Pulmonary hygiene  Aspiration precautions  Failed speech eval 11/18 and required frequent NT suctioning, now NPO  11/19 passed speech eval and stared on diet, tolerating well.  Pt is currently on RA, resolved   Continue chest oscillator and Acapella to help bring up the mucus    "

## 2024-11-22 NOTE — PLAN OF CARE
Problem: PAIN - ADULT  Goal: Verbalizes/displays adequate comfort level or baseline comfort level  Description: Interventions:  - Encourage patient to monitor pain and request assistance  - Assess pain using appropriate pain scale  - Administer analgesics based on type and severity of pain and evaluate response  - Implement non-pharmacological measures as appropriate and evaluate response  - Consider cultural and social influences on pain and pain management  - Notify physician/advanced practitioner if interventions unsuccessful or patient reports new pain  Outcome: Progressing     Problem: INFECTION - ADULT  Goal: Absence or prevention of progression during hospitalization  Description: INTERVENTIONS:  - Assess and monitor for signs and symptoms of infection  - Monitor lab/diagnostic results  - Monitor all insertion sites, i.e. indwelling lines, tubes, and drains  - Monitor endotracheal if appropriate and nasal secretions for changes in amount and color  - Jolley appropriate cooling/warming therapies per order  - Administer medications as ordered  - Instruct and encourage patient and family to use good hand hygiene technique  - Identify and instruct in appropriate isolation precautions for identified infection/condition  Outcome: Progressing  Goal: Absence of fever/infection during neutropenic period  Description: INTERVENTIONS:  - Monitor WBC    Outcome: Progressing     Problem: SAFETY ADULT  Goal: Patient will remain free of falls  Description: INTERVENTIONS:  - Educate patient/family on patient safety including physical limitations  - Instruct patient to call for assistance with activity   - Consult OT/PT to assist with strengthening/mobility   - Keep Call bell within reach  - Keep bed low and locked with side rails adjusted as appropriate  - Keep care items and personal belongings within reach  - Initiate and maintain comfort rounds  - Make Fall Risk Sign visible to staff  - Offer Toileting every 2 Hours,  in advance of need  - Initiate/Maintain bed alarm  - Obtain necessary fall risk management equipment  - Apply yellow socks and bracelet for high fall risk patients  - Consider moving patient to room near nurses station  Outcome: Progressing  Goal: Maintain or return to baseline ADL function  Description: INTERVENTIONS:  -  Assess patient's ability to carry out ADLs; assess patient's baseline for ADL function and identify physical deficits which impact ability to perform ADLs (bathing, care of mouth/teeth, toileting, grooming, dressing, etc.)  - Assess/evaluate cause of self-care deficits   - Assess range of motion  - Assess patient's mobility; develop plan if impaired  - Assess patient's need for assistive devices and provide as appropriate  - Encourage maximum independence but intervene and supervise when necessary  - Involve family in performance of ADLs  - Assess for home care needs following discharge   - Consider OT consult to assist with ADL evaluation and planning for discharge  - Provide patient education as appropriate  Outcome: Progressing  Goal: Maintains/Returns to pre admission functional level  Description: INTERVENTIONS:  - Perform AM-PAC 6 Click Basic Mobility/ Daily Activity assessment daily.  - Set and communicate daily mobility goal to care team and patient/family/caregiver.   - Collaborate with rehabilitation services on mobility goals if consulted  - Perform Range of Motion 2 times a day.  - Reposition patient every 2 hours.  - Dangle patient 2 times a day  - Stand patient 2 times a day  - Ambulate patient 2 times a day  - Out of bed to chair 2 times a day   - Out of bed for meals 2 times a day  - Out of bed for toileting  - Record patient progress and toleration of activity level   Outcome: Progressing

## 2024-11-22 NOTE — PHYSICAL THERAPY NOTE
PT Cancellation   11/22/24 4188   PT Last Visit   PT Visit Date 11/22/24   Note Type   Note Type Cancelled Session   Cancel Reasons Other  (chart reviewed, attempted to see pt for PT, however pt was sleeping soundly.  Would not open eyes or wake up to respond to PT.  RN made aware.  Will follow later as schedule allows.)   Licensure   [place] License Number  Nereyda Gutierrez PT   [id number]

## 2024-11-22 NOTE — LETTER
11/22/24    Dear Gregg Partida,    I am a Care Manager with [address] [address] [phone].      We have made several attempts to call you by phone.  It is important that you contact us back at [phone] so that we can assist with your care needs.     Sincerely,     Roberta Shepherd LCSW  Social Work Care Manager

## 2024-11-23 LAB
ALBUMIN SERPL BCG-MCNC: 3.2 G/DL (ref 3.5–5)
ALP SERPL-CCNC: 138 U/L (ref 34–104)
ALT SERPL W P-5'-P-CCNC: 67 U/L (ref 7–52)
ANION GAP SERPL CALCULATED.3IONS-SCNC: 7 MMOL/L (ref 4–13)
AST SERPL W P-5'-P-CCNC: 61 U/L (ref 13–39)
BASOPHILS # BLD AUTO: 0.07 THOUSANDS/ÂΜL (ref 0–0.1)
BASOPHILS NFR BLD AUTO: 1 % (ref 0–1)
BILIRUB SERPL-MCNC: 0.43 MG/DL (ref 0.2–1)
BUN SERPL-MCNC: 19 MG/DL (ref 5–25)
CALCIUM ALBUM COR SERPL-MCNC: 8.7 MG/DL (ref 8.3–10.1)
CALCIUM SERPL-MCNC: 8.1 MG/DL (ref 8.4–10.2)
CHLORIDE SERPL-SCNC: 95 MMOL/L (ref 96–108)
CO2 SERPL-SCNC: 27 MMOL/L (ref 21–32)
CREAT SERPL-MCNC: 0.9 MG/DL (ref 0.6–1.3)
EOSINOPHIL # BLD AUTO: 0.08 THOUSAND/ÂΜL (ref 0–0.61)
EOSINOPHIL NFR BLD AUTO: 1 % (ref 0–6)
ERYTHROCYTE [DISTWIDTH] IN BLOOD BY AUTOMATED COUNT: 15.9 % (ref 11.6–15.1)
GFR SERPL CREATININE-BSD FRML MDRD: 91 ML/MIN/1.73SQ M
GLUCOSE SERPL-MCNC: 111 MG/DL (ref 65–140)
GLUCOSE SERPL-MCNC: 112 MG/DL (ref 65–140)
GLUCOSE SERPL-MCNC: 119 MG/DL (ref 65–140)
GLUCOSE SERPL-MCNC: 122 MG/DL (ref 65–140)
GLUCOSE SERPL-MCNC: 130 MG/DL (ref 65–140)
HCT VFR BLD AUTO: 30.3 % (ref 36.5–49.3)
HGB BLD-MCNC: 9.9 G/DL (ref 12–17)
IMM GRANULOCYTES # BLD AUTO: 0.04 THOUSAND/UL (ref 0–0.2)
IMM GRANULOCYTES NFR BLD AUTO: 1 % (ref 0–2)
LYMPHOCYTES # BLD AUTO: 1.16 THOUSANDS/ÂΜL (ref 0.6–4.47)
LYMPHOCYTES NFR BLD AUTO: 15 % (ref 14–44)
MAGNESIUM SERPL-MCNC: 1.8 MG/DL (ref 1.9–2.7)
MCH RBC QN AUTO: 32.2 PG (ref 26.8–34.3)
MCHC RBC AUTO-ENTMCNC: 32.7 G/DL (ref 31.4–37.4)
MCV RBC AUTO: 99 FL (ref 82–98)
MONOCYTES # BLD AUTO: 1.29 THOUSAND/ÂΜL (ref 0.17–1.22)
MONOCYTES NFR BLD AUTO: 17 % (ref 4–12)
NEUTROPHILS # BLD AUTO: 5.01 THOUSANDS/ÂΜL (ref 1.85–7.62)
NEUTS SEG NFR BLD AUTO: 65 % (ref 43–75)
NRBC BLD AUTO-RTO: 0 /100 WBCS
PLATELET # BLD AUTO: 244 THOUSANDS/UL (ref 149–390)
PMV BLD AUTO: 11.1 FL (ref 8.9–12.7)
POTASSIUM SERPL-SCNC: 4.2 MMOL/L (ref 3.5–5.3)
PROT SERPL-MCNC: 6.2 G/DL (ref 6.4–8.4)
RBC # BLD AUTO: 3.07 MILLION/UL (ref 3.88–5.62)
SODIUM SERPL-SCNC: 129 MMOL/L (ref 135–147)
WBC # BLD AUTO: 7.65 THOUSAND/UL (ref 4.31–10.16)

## 2024-11-23 PROCEDURE — 99232 SBSQ HOSP IP/OBS MODERATE 35: CPT | Performed by: INTERNAL MEDICINE

## 2024-11-23 PROCEDURE — 83735 ASSAY OF MAGNESIUM: CPT

## 2024-11-23 PROCEDURE — 85025 COMPLETE CBC W/AUTO DIFF WBC: CPT

## 2024-11-23 PROCEDURE — 82948 REAGENT STRIP/BLOOD GLUCOSE: CPT

## 2024-11-23 PROCEDURE — 80053 COMPREHEN METABOLIC PANEL: CPT

## 2024-11-23 RX ORDER — METOPROLOL TARTRATE 100 MG/1
200 TABLET ORAL EVERY 12 HOURS SCHEDULED
Qty: 120 TABLET | Refills: 2 | Status: ON HOLD | OUTPATIENT
Start: 2024-11-23

## 2024-11-23 RX ORDER — DIGOXIN 125 MCG
125 TABLET ORAL DAILY
Qty: 30 TABLET | Refills: 1 | Status: ON HOLD | OUTPATIENT
Start: 2024-11-23

## 2024-11-23 RX ORDER — SODIUM CHLORIDE 1 G/1
2 TABLET ORAL 2 TIMES DAILY WITH MEALS
Status: DISCONTINUED | OUTPATIENT
Start: 2024-11-23 | End: 2024-11-25 | Stop reason: HOSPADM

## 2024-11-23 RX ORDER — LANOLIN ALCOHOL/MO/W.PET/CERES
800 CREAM (GRAM) TOPICAL 2 TIMES DAILY
Qty: 120 TABLET | Refills: 1 | Status: ON HOLD | OUTPATIENT
Start: 2024-11-23

## 2024-11-23 RX ORDER — SODIUM CHLORIDE 1 G/1
2 TABLET ORAL 2 TIMES DAILY WITH MEALS
Qty: 120 TABLET | Refills: 0 | Status: ON HOLD | OUTPATIENT
Start: 2024-11-23

## 2024-11-23 RX ADMIN — GLYCERIN 1 DROP: .002; .002; .01 SOLUTION/ DROPS OPHTHALMIC at 10:59

## 2024-11-23 RX ADMIN — PANTOPRAZOLE SODIUM 40 MG: 40 TABLET, DELAYED RELEASE ORAL at 06:00

## 2024-11-23 RX ADMIN — RANOLAZINE 500 MG: 500 TABLET, FILM COATED, EXTENDED RELEASE ORAL at 11:00

## 2024-11-23 RX ADMIN — APIXABAN 5 MG: 5 TABLET, FILM COATED ORAL at 10:53

## 2024-11-23 RX ADMIN — Medication 800 MG: at 10:54

## 2024-11-23 RX ADMIN — SODIUM CHLORIDE 2 G: 1 TABLET ORAL at 18:08

## 2024-11-23 RX ADMIN — RANOLAZINE 500 MG: 500 TABLET, FILM COATED, EXTENDED RELEASE ORAL at 22:06

## 2024-11-23 RX ADMIN — FERROUS SULFATE TAB 325 MG (65 MG ELEMENTAL FE) 325 MG: 325 (65 FE) TAB at 10:53

## 2024-11-23 RX ADMIN — LABETALOL HYDROCHLORIDE 10 MG: 5 INJECTION, SOLUTION INTRAVENOUS at 22:09

## 2024-11-23 RX ADMIN — ASPIRIN 81 MG CHEWABLE TABLET 81 MG: 81 TABLET CHEWABLE at 10:57

## 2024-11-23 RX ADMIN — THIAMINE HCL TAB 100 MG 100 MG: 100 TAB at 10:57

## 2024-11-23 RX ADMIN — Medication 6 MG: at 22:06

## 2024-11-23 RX ADMIN — DIGOXIN 125 MCG: 125 TABLET ORAL at 10:53

## 2024-11-23 RX ADMIN — TAMSULOSIN HYDROCHLORIDE 0.4 MG: 0.4 CAPSULE ORAL at 10:53

## 2024-11-23 RX ADMIN — Medication 800 MG: at 18:08

## 2024-11-23 RX ADMIN — METOPROLOL TARTRATE 200 MG: 100 TABLET, FILM COATED ORAL at 10:56

## 2024-11-23 RX ADMIN — FLUTICASONE FUROATE AND VILANTEROL TRIFENATATE 1 PUFF: 200; 25 POWDER RESPIRATORY (INHALATION) at 10:53

## 2024-11-23 RX ADMIN — PANTOPRAZOLE SODIUM 40 MG: 40 TABLET, DELAYED RELEASE ORAL at 18:08

## 2024-11-23 RX ADMIN — METOPROLOL TARTRATE 200 MG: 100 TABLET, FILM COATED ORAL at 20:15

## 2024-11-23 RX ADMIN — FOLIC ACID 1 MG: 1 TABLET ORAL at 10:56

## 2024-11-23 RX ADMIN — GABAPENTIN 300 MG: 300 CAPSULE ORAL at 10:53

## 2024-11-23 RX ADMIN — APIXABAN 5 MG: 5 TABLET, FILM COATED ORAL at 18:08

## 2024-11-23 NOTE — ASSESSMENT & PLAN NOTE
Na 130, Cl 89, bicarb 20, BUN 37, Ca 7.6 POA in setting of chronic alcohol abuse and starvation.     Replete as needed  Increase salt tablets 2 g twice daily

## 2024-11-23 NOTE — ASSESSMENT & PLAN NOTE
Chronic, appears hypovolemic on admission - no evidence of acute exacerbation. Home medications include lopressor 50 mg BID and ranexa 500mg BID    BNP 1150 POA    Plan:  Cardiology consulted  Daily weights, I&O's  increased home Lopressor to 200mg BID and continue home Ranexa 500mg BID

## 2024-11-23 NOTE — DISCHARGE INSTR - AVS FIRST PAGE
Continue Digoxin 0.125mg daily (new medication)  Continue Lopressor 200mg twice a day.  Start taking salt tablet 2g twice a day  Start taking amlodipine 5 mg daily  Repeat BMP in 1 week.  Follow up outpatient with Cardiology and [facility].

## 2024-11-23 NOTE — PROGRESS NOTES
Progress Note - Internal Medicine   Name: Gregg Partida 64 y.o. male I MRN: [id number]  Unit/Bed#: 4 Edna 420-01 I Date of Admission: 11/14/2024   Date of Service: 11/23/2024 I Hospital Day: 9     Assessment & Plan  Atrial fibrillation with RVR (HCC)  Presented to ED with afib RVR, HR > 140 sustained. EKG afib RVR with 's. Patient noting he has not been compliant with home eliquis or lopressor 50 mg BID.    In ED: s/p lopressor 5 mg x 2, cardizem 15 mg x 1  11/15 OVN: Cardizem x2 Lopressor x1    Plan:  - Pt was on Cardizem gtt  - Pt refuses cardioversion.   -Cardizem 30 mg every 6 hours and Cardizem gtt. were attempted but patient was persistently in RVR  - s/p digoxin x 2, rate started to come down    - Cardiology consulted, appreciate recs  -Continue digoxin 125 mcg daily  - Continue home Eliquis 5 mg BID   -Continue lopressor 200mg BID  - Continue to monitor for rate control and BP  - telemetry  -Unable to contact patient's son, may require medication delivery to home tomorrow  COPD (chronic obstructive pulmonary disease) (HCC)  Chronic, stable, presenting with cough that patient reports is at baseline - noting he is non compliant with home inhalers  Patient received 2 doses of p.o. prednisone 40 mg    Plan:  -continue home Breo  -Xopenex q6hr scheduled  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 disease (HCC)  Chronic, home medications include metformin 500 mg QD. Hba1c 5.2.     Plan:  -Hold metformin   -Insulin sliding scale, hypoglycemia protocol  -Pt is not tolerating PO today. Switched to Clear liquid diet  Electrolyte abnormality  Na 130, Cl 89, bicarb 20, BUN 37, Ca 7.6 POA in setting of chronic alcohol abuse and starvation.     Replete as needed  Increase salt tablets 2 g twice daily  Chronic heart failure with preserved ejection fraction (HCC)  Chronic, appears hypovolemic on admission - no evidence of acute exacerbation. Home medications include lopressor 50 mg BID and ranexa 500mg  BID    BNP 1150 POA    Plan:  Cardiology consulted  Daily weights, I&O's  increased home Lopressor to 200mg BID and continue home Ranexa 500mg BID  Ambulatory dysfunction  Chronic, noting hx of several falls in setting of chronic alcohol abuse - hematomas noted diffusely on skin. PT/OT ordered    CT head wo contrast: no acute intracranial abnormality  CT C-spine wo contrast: No acute cervical spine fracture or traumatic malalignment.  CT chest/abd/pelvis wo contrast: mildly displaced left lateral 7th rib fracture, no hemothorax/pneumothorax, no acute trauma in abdomen or pelvis     -PT/OT recommend home PT, patient refusing.  BPH (benign prostatic hyperplasia)  Chronic, stable    continue home flomax 0.4 mg  Closed fracture of one rib of left side  CT C/A/P: Mildly displaced left lateral seventh rib fracture.     -Pain control with PRN tylenol  -incentive spirometry   -conservative management  Chronic hyponatremia  Chronic hyponatremia likely secondary to alcohol use  Sodium 129 11/17, was briefly on 1.8% NSS which was d/c'ed once sodium reached 135  Sodium 129 today    Increase salt tablet to 2 g twice daily  Continue to trend   Close I&O    VTE Pharmacologic Prophylaxis: VTE Score: 3 Moderate Risk (Score 3-4) - Pharmacological DVT Prophylaxis Ordered: apixaban (Eliquis).    Mobility:   Basic Mobility Inpatient Raw Score: 19  JH-HLM Goal: 6: Walk 10 steps or more  JH-HLM Achieved: 7: Walk 25 feet or more  JH-HLM Goal achieved. Continue to encourage appropriate mobility.    Patient Centered Rounds: I performed bedside rounds with nursing staff today.   Discussions with Specialists or Other Care Team Provider: Cardiology    Education and Discussions with Family / Patient: Attempted to update  (son) via phone. Left voicemail.     Current Length of Stay: 9 day(s)  Current Patient Status: Inpatient   Certification Statement: The patient will continue to require additional inpatient hospital stay due to  Afib with RVR, unable to obtain outpatient prescriptions  Discharge Plan: Anticipate discharge tomorrow to home.    Code Status: Level 1 - Full Code    Subjective   Patient was seen and examined at bedside this morning and on bedside rounds.  Patient notes adequate voiding and bowel movements and urine.  Patient has been unable to reach his son Gregg who would be picking him up and perking up his prescription medications.  Patient does not feel ready to go home.  He is declining physical therapy and Occupational Therapy.    Review of Systems   Constitutional: Negative.    HENT: Negative.     Eyes: Negative.  Negative for pain and visual disturbance.   Respiratory:  Negative for cough and shortness of breath.    Cardiovascular:  Positive for palpitations. Negative for chest pain and leg swelling.   Gastrointestinal:  Negative for abdominal pain, constipation, diarrhea, nausea and vomiting.   Endocrine: Negative.    Genitourinary: Negative.    Allergic/Immunologic: Negative.    Neurological: Negative.    Hematological: Negative.    Psychiatric/Behavioral: Negative.     All other systems reviewed and are negative.      Objective :  Temp:  [97.6 °F (36.4 °C)-98 °F (36.7 °C)] 97.9 °F (36.6 °C)  HR:  [] 79  BP: (156-184)/() 159/100  Resp:  [14-20] 18  SpO2:  [90 %-95 %] 92 %    Body mass index is 24.11 kg/m².     Input and Output Summary (last 24 hours):     Intake/Output Summary (Last 24 hours) at 11/23/2024 1642  Last data filed at 11/23/2024 0601  Gross per 24 hour   Intake --   Output 1050 ml   Net -1050 ml       Physical Exam  Vitals reviewed.   Constitutional:       General: He is not in acute distress.     Appearance: Normal appearance. He is not ill-appearing.   HENT:      Head: Normocephalic and atraumatic.      Right Ear: External ear normal.      Left Ear: External ear normal.      Nose: Nose normal. No congestion or rhinorrhea.      Mouth/Throat:      Mouth: Mucous membranes are moist.       Pharynx: Oropharynx is clear. No oropharyngeal exudate or posterior oropharyngeal erythema.   Eyes:      General:         Right eye: No discharge.         Left eye: No discharge.      Conjunctiva/sclera: Conjunctivae normal.   Cardiovascular:      Rate and Rhythm: Tachycardia present. Rhythm irregular.      Pulses: Normal pulses.      Heart sounds: Normal heart sounds. No murmur heard.     No friction rub. No gallop.   Pulmonary:      Effort: Pulmonary effort is normal. No respiratory distress.      Breath sounds: Normal breath sounds. No stridor. No wheezing, rhonchi or rales.   Chest:      Chest wall: No tenderness.   Abdominal:      General: Abdomen is flat. Bowel sounds are normal. There is no distension.      Palpations: Abdomen is soft.      Tenderness: There is no abdominal tenderness. There is no guarding.   Musculoskeletal:         General: No swelling, tenderness, deformity or signs of injury. Normal range of motion.      Cervical back: Normal range of motion and neck supple. No rigidity or tenderness.      Right lower leg: No edema.      Left lower leg: No edema.   Lymphadenopathy:      Cervical: No cervical adenopathy.   Skin:     General: Skin is warm and dry.      Capillary Refill: Capillary refill takes less than 2 seconds.      Findings: No bruising, erythema, lesion or rash.   Neurological:      General: No focal deficit present.      Mental Status: He is alert and oriented to person, place, and time. Mental status is at baseline.      Motor: No weakness.      Gait: Gait normal.      Deep Tendon Reflexes: Reflexes normal.   Psychiatric:         Mood and Affect: Mood normal.         Behavior: Behavior normal.         Thought Content: Thought content normal.         Lines/Drains:        Lab Results: I have reviewed the following results:   Results from last 7 days   Lab Units 11/23/24  0503   WBC Thousand/uL 7.65   HEMOGLOBIN g/dL 9.9*   HEMATOCRIT % 30.3*   PLATELETS Thousands/uL 244   SEGS PCT % 65    LYMPHO PCT % 15   MONO PCT % 17*   EOS PCT % 1     Results from last 7 days   Lab Units 11/23/24  0503   SODIUM mmol/L 129*   POTASSIUM mmol/L 4.2   CHLORIDE mmol/L 95*   CO2 mmol/L 27   BUN mg/dL 19   CREATININE mg/dL 0.90   ANION GAP mmol/L 7   CALCIUM mg/dL 8.1*   ALBUMIN g/dL 3.2*   TOTAL BILIRUBIN mg/dL 0.43   ALK PHOS U/L 138*   ALT U/L 67*   AST U/L 61*   GLUCOSE RANDOM mg/dL 111     Results from last 7 days   Lab Units 11/18/24  0526   INR  1.24*     Results from last 7 days   Lab Units 11/23/24  1609 11/23/24  1127 11/23/24  0709 11/22/24  2032 11/22/24  1605 11/22/24  1102 11/22/24  0727 11/21/24  2100 11/21/24  1518 11/21/24  1058 11/21/24  0651 11/20/24  1956   POC GLUCOSE mg/dl 122 112 130 147* 117 112 101 156* 169* 129 102 127         Results from last 7 days   Lab Units 11/17/24  0526   PROCALCITONIN ng/ml 0.10       Recent Cultures (last 7 days):   Results from last 7 days   Lab Units 11/16/24  1739 11/16/24  1719   BLOOD CULTURE  No Growth After 5 Days. No Growth After 5 Days.       Imaging Results Review: No pertinent imaging studies reviewed.  Other Study Results Review: No additional pertinent studies reviewed.    Last 24 Hours Medication List:     Current Facility-Administered Medications:     acetaminophen (TYLENOL) tablet 650 mg, Q6H PRN    apixaban (ELIQUIS) tablet 5 mg, BID    Artificial Tears Op Soln 1 drop, Q6H PRN    aspirin chewable tablet 81 mg, Daily    digoxin (LANOXIN) tablet 125 mcg, Daily    ferrous sulfate tablet 325 mg, Daily With Breakfast    fluticasone-vilanterol 200-25 mcg/actuation 1 puff, Daily    folic acid (FOLVITE) tablet 1 mg, Daily    gabapentin (NEURONTIN) capsule 300 mg, Daily    insulin lispro (HumALOG/ADMELOG) 100 units/mL subcutaneous injection 1-5 Units, HS    insulin lispro (HumALOG/ADMELOG) 100 units/mL subcutaneous injection 1-6 Units, TID AC **AND** Fingerstick Glucose (POCT), 4x Daily AC and at bedtime    labetalol (NORMODYNE) injection 10 mg, Q4H PRN     levalbuterol (XOPENEX) inhalation solution 1.25 mg, Q6H    magnesium Oxide (MAG-OX) tablet 800 mg, BID    melatonin tablet 6 mg, HS    metoprolol tartrate (LOPRESSOR) tablet 200 mg, Q12H SEDA    nicotine (NICODERM CQ) 14 mg/24hr TD 24 hr patch 1 patch, Daily    pantoprazole (PROTONIX) EC tablet 40 mg, BID AC    ranolazine (RANEXA) 12 hr tablet 500 mg, Q12H    senna-docusate sodium (SENOKOT S) 8.6-50 mg per tablet 1 tablet, Daily PRN    sodium chloride 3 % inhalation solution 4 mL, Q6H    sodium chloride tablet 2 g, BID With Meals    tamsulosin (FLOMAX) capsule 0.4 mg, Daily    thiamine tablet 100 mg, Daily    trimethobenzamide (TIGAN) IM injection 200 mg, Q6H PRN    Administrative Statements   Today, Patient Was Seen By: Pamela Esquivel MD  I have spent a total time of 30 minutes in caring for this patient on the day of the visit/encounter including Counseling / Coordination of care, Documenting in the medical record, Reviewing / ordering tests, medicine, procedures  , Obtaining or reviewing history  , and Communicating with other healthcare professionals .    **Please Note: This note may have been constructed using a voice recognition system.**

## 2024-11-23 NOTE — ASSESSMENT & PLAN NOTE
patient with history of recurrent atrial fibrillation  8/16/2024 TTE: EF visibly estimated 65% with severe dilatation of left atrium  unclear last time patient took his medications secondary to intoxication  telemetry demonstrates rapid atrial fibrillation rates 130s to 160s  Heart rate this morning ranges from 50 to-81 bpm  discuss with patient possible cardioversion, he differs and declines procedure at this time.  Doubtful patient will successfully cardioversion due to the severe dilatation of his left atrium.  Continue digoxin 0.125 mg daily started on 11/2021 after initial partial loading  Continue Lopressor to 200 mg BID   Continue Eliquis 5 mg bid  Will follow rate control strategy

## 2024-11-23 NOTE — ASSESSMENT & PLAN NOTE
Presented to ED with afib RVR, HR > 140 sustained. EKG afib RVR with 's. Patient noting he has not been compliant with home eliquis or lopressor 50 mg BID.    In ED: s/p lopressor 5 mg x 2, cardizem 15 mg x 1  11/15 OVN: Cardizem x2 Lopressor x1    Plan:  - Pt was on Cardizem gtt  - Pt refuses cardioversion.   -Cardizem 30 mg every 6 hours and Cardizem gtt. were attempted but patient was persistently in RVR  - s/p digoxin x 2, rate started to come down    - Cardiology consulted, appreciate recs  -Continue digoxin 125 mcg daily  - Continue home Eliquis 5 mg BID   -Continue lopressor 200mg BID  - Continue to monitor for rate control and BP  - telemetry  -Unable to contact patient's son, may require medication delivery to home tomorrow

## 2024-11-23 NOTE — PLAN OF CARE
Problem: PAIN - ADULT  Goal: Verbalizes/displays adequate comfort level or baseline comfort level  Description: Interventions:  - Encourage patient to monitor pain and request assistance  - Assess pain using appropriate pain scale  - Administer analgesics based on type and severity of pain and evaluate response  - Implement non-pharmacological measures as appropriate and evaluate response  - Consider cultural and social influences on pain and pain management  - Notify physician/advanced practitioner if interventions unsuccessful or patient reports new pain  Outcome: Progressing     Problem: INFECTION - ADULT  Goal: Absence or prevention of progression during hospitalization  Description: INTERVENTIONS:  - Assess and monitor for signs and symptoms of infection  - Monitor lab/diagnostic results  - Monitor all insertion sites, i.e. indwelling lines, tubes, and drains  - Monitor endotracheal if appropriate and nasal secretions for changes in amount and color  - [place] appropriate cooling/warming therapies per order  - Administer medications as ordered  - Instruct and encourage patient and family to use good hand hygiene technique  - Identify and instruct in appropriate isolation precautions for identified infection/condition  Outcome: Progressing  Goal: Absence of fever/infection during neutropenic period  Description: INTERVENTIONS:  - Monitor WBC    Outcome: Progressing     Problem: SAFETY ADULT  Goal: Patient will remain free of falls  Description: INTERVENTIONS:  - Educate patient/family on patient safety including physical limitations  - Instruct patient to call for assistance with activity   - Consult OT/PT to assist with strengthening/mobility   - Keep Call bell within reach  - Keep bed low and locked with side rails adjusted as appropriate  - Keep care items and personal belongings within reach  - Initiate and maintain comfort rounds  - Make Fall Risk Sign visible to staff  - Offer Toileting every 2 Hours,  in advance of need  - Initiate/Maintain bed alarm  - Obtain necessary fall risk management equipment  - Apply yellow socks and bracelet for high fall risk patients  - Consider moving patient to room near nurses station  Outcome: Progressing  Goal: Maintain or return to baseline ADL function  Description: INTERVENTIONS:  -  Assess patient's ability to carry out ADLs; assess patient's baseline for ADL function and identify physical deficits which impact ability to perform ADLs (bathing, care of mouth/teeth, toileting, grooming, dressing, etc.)  - Assess/evaluate cause of self-care deficits   - Assess range of motion  - Assess patient's mobility; develop plan if impaired  - Assess patient's need for assistive devices and provide as appropriate  - Encourage maximum independence but intervene and supervise when necessary  - Involve family in performance of ADLs  - Assess for home care needs following discharge   - Consider OT consult to assist with ADL evaluation and planning for discharge  - Provide patient education as appropriate  Outcome: Progressing  Goal: Maintains/Returns to pre admission functional level  Description: INTERVENTIONS:  - Perform AM-PAC 6 Click Basic Mobility/ Daily Activity assessment daily.  - Set and communicate daily mobility goal to care team and patient/family/caregiver.   - Collaborate with rehabilitation services on mobility goals if consulted  - Perform Range of Motion 2 times a day.  - Reposition patient every 2 hours.  - Dangle patient 2 times a day  - Stand patient 2 times a day  - Ambulate patient 2 times a day  - Out of bed to chair 2 times a day   - Out of bed for meals 2 times a day  - Out of bed for toileting  - Record patient progress and toleration of activity level   Outcome: Progressing     Problem: DISCHARGE PLANNING  Goal: Discharge to home or other facility with appropriate resources  Description: INTERVENTIONS:  - Identify barriers to discharge w/patient and caregiver  -  Arrange for needed discharge resources and transportation as appropriate  - Identify discharge learning needs (meds, wound care, etc.)  - Arrange for interpretive services to assist at discharge as needed  - Refer to Case Management Department for coordinating discharge planning if the patient needs post-hospital services based on physician/advanced practitioner order or complex needs related to functional status, cognitive ability, or social support system  Outcome: Progressing     Problem: Knowledge Deficit  Goal: Patient/family/caregiver demonstrates understanding of disease process, treatment plan, medications, and discharge instructions  Description: Complete learning assessment and assess knowledge base.  Interventions:  - Provide teaching at level of understanding  - Provide teaching via preferred learning methods  Outcome: Progressing     Problem: NEUROSENSORY - ADULT  Goal: Achieves stable or improved neurological status  Description: INTERVENTIONS  - Monitor and report changes in neurological status  - Monitor vital signs such as temperature, blood pressure, glucose, and any other labs ordered   - Initiate measures to prevent increased intracranial pressure  - Monitor for seizure activity and implement precautions if appropriate      Outcome: Progressing  Goal: Remains free of injury related to seizures activity  Description: INTERVENTIONS  - Maintain airway, patient safety  and administer oxygen as ordered  - Monitor patient for seizure activity, document and report duration and description of seizure to physician/advanced practitioner  - If seizure occurs,  ensure patient safety during seizure  - Reorient patient post seizure  - Seizure pads on all 4 side rails  - Instruct patient/family to notify RN of any seizure activity including if an aura is experienced  - Instruct patient/family to call for assistance with activity based on nursing assessment  - Administer anti-seizure medications if  ordered    Outcome: Progressing  Goal: Achieves maximal functionality and self care  Description: INTERVENTIONS  - Monitor swallowing and airway patency with patient fatigue and changes in neurological status  - Encourage and assist patient to increase activity and self care.   - Encourage visually impaired, hearing impaired and aphasic patients to use assistive/communication devices  Outcome: Progressing     Problem: CARDIOVASCULAR - ADULT  Goal: Maintains optimal cardiac output and hemodynamic stability  Description: INTERVENTIONS:  - Monitor I/O, vital signs and rhythm  - Monitor for S/S and trends of decreased cardiac output  - Administer and titrate ordered vasoactive medications to optimize hemodynamic stability  - Assess quality of pulses, skin color and temperature  - Assess for signs of decreased coronary artery perfusion  - Instruct patient to report change in severity of symptoms  Outcome: Progressing     Problem: RESPIRATORY - ADULT  Goal: Achieves optimal ventilation and oxygenation  Description: INTERVENTIONS:  - Assess for changes in respiratory status  - Assess for changes in mentation and behavior  - Position to facilitate oxygenation and minimize respiratory effort  - Oxygen administered by appropriate delivery if ordered  - Initiate smoking cessation education as indicated  - Encourage broncho-pulmonary hygiene including cough, deep breathe, Incentive Spirometry  - Assess the need for suctioning and aspirate as needed  - Assess and instruct to report SOB or any respiratory difficulty  - Respiratory Therapy support as indicated  Outcome: Progressing     Problem: GASTROINTESTINAL - ADULT  Goal: Minimal or absence of nausea and/or vomiting  Description: INTERVENTIONS:  - Administer IV fluids if ordered to ensure adequate hydration  - Maintain NPO status until nausea and vomiting are resolved  - Nasogastric tube if ordered  - Administer ordered antiemetic medications as needed  - Provide  nonpharmacologic comfort measures as appropriate  - Advance diet as tolerated, if ordered  - Consider nutrition services referral to assist patient with adequate nutrition and appropriate food choices  Outcome: Progressing  Goal: Maintains adequate nutritional intake  Description: INTERVENTIONS:  - Monitor percentage of each meal consumed  - Identify factors contributing to decreased intake, treat as appropriate  - Assist with meals as needed  - Monitor I&O, weight, and lab values if indicated  - Obtain nutrition services referral as needed  Outcome: Progressing     Problem: GENITOURINARY - ADULT  Goal: Maintains or returns to baseline urinary function  Description: INTERVENTIONS:  - Assess urinary function  - Encourage oral fluids to ensure adequate hydration if ordered  - Administer IV fluids as ordered to ensure adequate hydration  - Administer ordered medications as needed  - Offer frequent toileting  - Follow urinary retention protocol if ordered  Outcome: Progressing     Problem: METABOLIC, FLUID AND ELECTROLYTES - ADULT  Goal: Electrolytes maintained within normal limits  Description: INTERVENTIONS:  - Monitor labs and assess patient for signs and symptoms of electrolyte imbalances  - Administer electrolyte replacement as ordered  - Monitor response to electrolyte replacements, including repeat lab results as appropriate  - Instruct patient on fluid and nutrition as appropriate  Outcome: Progressing  Goal: Fluid balance maintained  Description: INTERVENTIONS:  - Monitor labs   - Monitor I/O and WT  - Instruct patient on fluid and nutrition as appropriate  - Assess for signs & symptoms of volume excess or deficit  Outcome: Progressing  Goal: Glucose maintained within target range  Description: INTERVENTIONS:  - Monitor Blood Glucose as ordered  - Assess for signs and symptoms of hyperglycemia and hypoglycemia  - Administer ordered medications to maintain glucose within target range  - Assess nutritional intake  and initiate nutrition service referral as needed  Outcome: Progressing     Problem: SKIN/TISSUE INTEGRITY - ADULT  Goal: Skin Integrity remains intact(Skin Breakdown Prevention)  Description: Assess:  -Perform [place] assessment every shift  -Clean and moisturize skin every shift  -Inspect skin when repositioning, toileting, and assisting with ADLS  -Assess under medical devices   -Assess extremities for adequate circulation and sensation     Bed Management:  -Have minimal linens on bed & keep smooth, unwrinkled  -Change linens as needed when moist or perspiring  -Avoid sitting or lying in one position for more than 2 hours while in bed  -Keep HOB at 30 degrees     Toileting:  -Offer bedside commode  -Assess for incontinence every 2 hours  -Use incontinent care products after each incontinent episode    Activity:  -Mobilize patient 2 times a day  -Encourage activity and walks on unit  -Encourage or provide ROM exercises   -Turn and reposition patient every 2 Hours  -Use appropriate equipment to lift or move patient in bed  -Instruct/ Assist with weight shifting every 2 when out of bed in chair  -Consider limitation of chair time 4 hour intervals    Skin Care:  -Avoid use of baby powder, tape, friction and shearing, hot water or constrictive clothing  -Relieve pressure over bony prominences  -Do not massage red bony areas    Next Steps:  -Teach patient strategies to minimize risks   -Consider consults to  interdisciplinary teams   Outcome: Progressing  Goal: Incision(s), wounds(s) or drain site(s) healing without S/S of infection  Description: INTERVENTIONS  - Assess and document dressing, incision, wound bed, drain sites and surrounding tissue  - Provide patient and family education  - Perform skin care/dressing changes every shift  Outcome: Progressing     Problem: HEMATOLOGIC - ADULT  Goal: Maintains hematologic stability  Description: INTERVENTIONS  - Assess for signs and symptoms of bleeding or hemorrhage  -  Monitor labs  - Administer supportive blood products/factors as ordered and appropriate  Outcome: Progressing     Problem: MUSCULOSKELETAL - ADULT  Goal: Maintain or return mobility to safest level of function  Description: INTERVENTIONS:  - Assess patient's ability to carry out ADLs; assess patient's baseline for ADL function and identify physical deficits which impact ability to perform ADLs (bathing, care of mouth/teeth, toileting, grooming, dressing, etc.)  - Assess/evaluate cause of self-care deficits   - Assess range of motion  - Assess patient's mobility  - Assess patient's need for assistive devices and provide as appropriate  - Encourage maximum independence but intervene and supervise when necessary  - Involve family in performance of ADLs  - Assess for home care needs following discharge   - Consider OT consult to assist with ADL evaluation and planning for discharge  - Provide patient education as appropriate  Outcome: Progressing     Problem: Nutrition/Hydration-ADULT  Goal: Nutrient/Hydration intake appropriate for improving, restoring or maintaining nutritional needs  Description: Monitor and assess patient's nutrition/hydration status for malnutrition. Collaborate with interdisciplinary team and initiate plan and interventions as ordered.  Monitor patient's weight and dietary intake as ordered or per policy. Utilize nutrition screening tool and intervene as necessary. Determine patient's food preferences and provide high-protein, high-caloric foods as appropriate.     INTERVENTIONS:  - Monitor oral intake, urinary output, labs, and treatment plans  - Assess nutrition and hydration status and recommend course of action  - Evaluate amount of meals eaten  - Assist patient with eating if necessary   - Allow adequate time for meals  - Recommend/ encourage appropriate diets, oral nutritional supplements, and vitamin/mineral supplements  - Order, calculate, and assess calorie counts as needed  - Recommend,  monitor, and adjust tube feedings and TPN/PPN based on assessed needs  - Assess need for intravenous fluids  - Provide specific nutrition/hydration education as appropriate  - Include patient/family/caregiver in decisions related to nutrition  Outcome: Progressing     Problem: Prexisting or High Potential for Compromised Skin Integrity  Goal: Skin integrity is maintained or improved  Description: INTERVENTIONS:  - Identify patients at risk for skin breakdown  - Assess and monitor skin integrity  - Assess and monitor nutrition and hydration status  - Monitor labs   - Assess for incontinence   - Turn and reposition patient  - Assist with mobility/ambulation  - Relieve pressure over bony prominences  - Avoid friction and shearing  - Provide appropriate hygiene as needed including keeping skin clean and dry  - Evaluate need for skin moisturizer/barrier cream  - Collaborate with interdisciplinary team   - Patient/family teaching  - Consider wound care consult   Outcome: Progressing

## 2024-11-23 NOTE — PROGRESS NOTES
Progress Note - Cardiology   Name: Gregg Partida 64 y.o. male I MRN: [id number]  Unit/Bed#: 4 [place] 420-01 I Date of Admission: 11/14/2024   Date of Service: 11/23/2024 I Hospital Day: 9     Assessment & Plan  Atrial fibrillation with RVR ([facility])  patient with history of recurrent atrial fibrillation  8/16/2024 TTE: EF visibly estimated 65% with severe dilatation of left atrium  unclear last time patient took his medications secondary to intoxication  telemetry demonstrates rapid atrial fibrillation rates 130s to 160s  Heart rate this morning ranges from 50 to-81 bpm  discuss with patient possible cardioversion, he differs and declines procedure at this time.  Doubtful patient will successfully cardioversion due to the severe dilatation of his left atrium.  Continue digoxin 0.125 mg daily started on 11/2021 after initial partial loading  Continue Lopressor to 200 mg BID   Continue Eliquis 5 mg bid  Will follow rate control strategy  Chronic heart failure with preserved ejection fraction ([facility])  Wt Readings from Last 3 Encounters:   11/23/24 85.2 kg (187 lb 12.8 oz)   09/19/24 86.2 kg (190 lb 1.6 oz)   09/16/24 83.3 kg (183 lb 10.3 oz)   8/16/2024 TTE: EF visibly estimated 65% with severe left atrial dilatation  BNP 1150  continue PRN diuretics  increase Lopressor to 200 mg BID  monitor I and O, daily weights and labs  CHF education          COPD (chronic obstructive pulmonary disease) ([facility])  continue current inhaler regimen per primary team  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 disease ([facility])  Lab Results   Component Value Date    HGBA1C 5.2 11/14/2024       Recent Labs     11/22/24  1102 11/22/24  1605 11/22/24  2032 11/23/24  0709   POCGLU 112 117 147* 130       Blood Sugar Average: Last 72 hrs:  (P) [id number]  managed per primary team  Electrolyte abnormality    Ambulatory dysfunction    BPH (benign prostatic hyperplasia)    Closed fracture of one rib of left side    Chronic  "hyponatremia            Subjective / Objective:     Review of Systems  Awake and alert.  Complains of occasional fluttering in the chest.  Still not agreeable to cardioversion  Vitals: Blood pressure 156/94, pulse 81, temperature 98 °F (36.7 °C), resp. rate 18, height 6' 2\" (1.88 m), weight 85.2 kg (187 lb 12.8 oz), SpO2 91%.  Vitals:    11/22/24 0531 11/23/24 0554   Weight: 87.8 kg (193 lb 9.6 oz) 85.2 kg (187 lb 12.8 oz)     Body mass index is 24.11 kg/m².  BP Readings from Last 3 Encounters:   11/23/24 156/94   11/04/24 120/75   10/31/24 (!) 187/97     Orthostatic Blood Pressures      Flowsheet Row Most Recent Value   Blood Pressure 156/94 filed at 11/23/2024 0320   Patient Position - Orthostatic VS Lying filed at 11/21/2024 1500          I/O         11/21 0701  11/22 0700 11/22 0701  11/23 0700 11/23 0701  11/24 0700    P.O. 1060 800     I.V. (mL/kg) 20 (0.2)      Total Intake(mL/kg) 1080 (12.3) 800 (9.4)     Urine (mL/kg/hr) 2200 (1) 1850 (0.9)     Stool       Total Output 2200 1850     Net -1120 -1050                  Invasive Devices       Peripheral Intravenous Line  Duration             Peripheral IV 11/17/24 Left Forearm 6 days    Long-Dwell Peripheral IV (Midline) 11/17/24 Left Basilic 5 days                      Intake/Output Summary (Last 24 hours) at 11/23/2024 0908  Last data filed at 11/23/2024 0601  Gross per 24 hour   Intake 800 ml   Output 1850 ml   Net -1050 ml         Physical Exam:   Physical Exam    Neurologic:  Alert & oriented x 3,  no focal deficits noted   Constitutional:  Well developed, well nourished,  With no acute distress  Eyes:  PERRL, conjunctiva normal   HENT:  Atraumatic, external ears normal, nose normal, .  NECK: Normal range of motion, no tenderness, neck is supple , No JVP  Respiratory:  Bilateral air entry, mostly clear to auscultation  Cardiovascular: Regular S1-S2 with a I/VI systolic murmur.  No pericardial rub or gallop audible  GI:  Soft, nondistended, audible bowel " sounds, nontender, no hepatosplenomegaly appreciated  Musculoskeletal:  No tenderness, no deformities.    Extremities:  No appreciable pitting edema. Distal pulses are present  Psychiatric:  Speech and behavior appropriate             Medications/ Allergies:     Current Facility-Administered Medications   Medication Dose Route Frequency Provider Last Rate    acetaminophen  650 mg Oral Q6H PRN Marychuy Hakeem Nadia, CRNP      apixaban  5 mg Oral BID Marychuy [place] Nadia, CRNP      Artificial Tears  1 drop Both Eyes Q6H PRN Jonel Calvillo MD      aspirin  81 mg Oral Daily Marychuy Hakeem Nadia, CRNP      digoxin  125 mcg Oral Daily Jonel Calvillo MD      ferrous sulfate  325 mg Oral Daily With Breakfast Marychuy [place] Nadia, CRNP      fluticasone-vilanterol  1 puff Inhalation Daily Marychuy Hakeem Nadia, CRNP      folic acid  1 mg Oral Daily Marychuy Hakeem Nadia, CRNP      gabapentin  300 mg Oral Daily Marychuy Hakeem Nadia, CRNP      insulin lispro  1-5 Units Subcutaneous HS Pablito Presley MD      insulin lispro  1-6 Units Subcutaneous TID AC Pablito Presley MD      labetalol  10 mg Intravenous Q4H PRN Marychuy [place] Nadia, CRNP      levalbuterol  1.25 mg Nebulization Q6H Marychuy Hakeem Nadia, CRNP      magnesium Oxide  800 mg Oral BID Jonel Calvillo MD      melatonin  6 mg Oral HS Jonel Calvillo MD      metoprolol tartrate  200 mg Oral Q12H [facility] Abigail Tran, CRNP      nicotine  1 patch Transdermal Daily Marychuy [place] Nadia, CRNP      pantoprazole  40 mg Oral BID AC Marychuy [place] Nadia, CRNP      ranolazine  500 mg Oral Q12H Marychuy [place] Nadia, CRNP      senna-docusate sodium  1 tablet Oral Daily PRN Marychuy Hakeem Nadia, CRNP      sodium chloride  4 mL Nebulization Q6H Marychuy Hakeem Nadia, CRNP      sodium chloride  1 g Oral BID With Meals Jonel Calvillo MD      tamsulosin  0.4 mg Oral Daily Marychuy [place] Nadia, CRNP      thiamine  100 mg Oral Daily Marychuy Hakeem Nadia, CRNP      trimethobenzamide   200 mg Intramuscular Q6H PRN Marychuy [place] Nadia, CRNP       acetaminophen, 650 mg, Q6H PRN  Artificial Tears, 1 drop, Q6H PRN  labetalol, 10 mg, Q4H PRN  senna-docusate sodium, 1 tablet, Daily PRN  trimethobenzamide, 200 mg, Q6H PRN      No Known Allergies        Labs:   Troponins:      CBC with diff:  Results from last 7 days   Lab Units 11/23/24  0503 11/22/24  0424 11/21/24  0529 11/20/24  0622 11/19/24  0505 11/18/24  0526 11/17/24  0526   WBC Thousand/uL 7.65 6.59 6.04 7.07 9.65 9.09 8.67   HEMOGLOBIN g/dL 9.9* 9.7* 9.7* 9.3* 10.0* 10.5* 11.2*   HEMATOCRIT % 30.3* 29.7* 29.6* 28.0* 30.0* 32.2* 33.4*   MCV fL 99* 99* 98 99* 99* 100* 98   PLATELETS Thousands/uL 244 203 182 154 130* 117* 89*   RBC Million/uL 3.07* 2.99* 3.01* 2.82* 3.04* 3.22* 3.41*   MCH pg 32.2 32.4 32.2 33.0 32.9 32.6 32.8   MCHC g/dL 32.7 32.7 32.8 33.2 33.3 32.6 33.5   RDW % 15.9* 15.7* 15.8* 16.1* 16.2* 16.0* 15.9*   MPV fL 11.1 10.5 10.5 10.8 10.4 10.9 10.9   NRBC AUTO /100 WBCs 0  --   --  0 0 0 0       CMP:  Results from last 7 days   Lab Units 11/23/24  0503 11/22/24  0424 11/21/24  0529 11/20/24  0622 11/19/24  0505 11/18/24  1950 11/18/24  0526 11/17/24  1559 11/17/24  0526   SODIUM mmol/L 129* 129* 128* 130* 137 137 136   < > 129*   POTASSIUM mmol/L 4.2 4.1 4.2 3.7 3.7 3.9 4.4   < > 4.6   CHLORIDE mmol/L 95* 96 93* 95* 102 100 100   < > 96   CO2 mmol/L 27 29 28 29 26 25 23   < > 24   ANION GAP mmol/L 7 4 7 6 9 12 13   < > 9   BUN mg/dL 19 16 12 12 18 22 25   < > 24   CREATININE mg/dL 0.90 0.89 0.82 0.74 0.77 0.84 0.96   < > 0.84   CALCIUM mg/dL 8.1* 7.9* 8.1* 7.8* 8.2* 7.8* 8.2*   < > 8.3*   AST U/L 61* 22 28 42* 52*  --  63*  --  94*   ALT U/L 67* 36 44 55* 65*  --  71*  --  78*   ALK PHOS U/L 138* 98 110* 104 137*  --  157*  --  186*   TOTAL PROTEIN g/dL 6.2* 5.9* 5.9* 5.6* 6.0*  --  5.7*  --  6.0*   ALBUMIN g/dL 3.2* 3.0* 3.0* 2.8* 3.0*  --  3.1*  --  3.2*   TOTAL BILIRUBIN mg/dL 0.43 0.40 0.51 0.70 0.86  --  0.87  --  0.89  "  EGFR ml/min/1.73sq m 91 91 94 98 97 93 84   < > 93    < > = values in this interval not displayed.       Magnesium:  Results from last 7 days   Lab Units 11/23/24  0503 11/22/24  0424 11/21/24  0529 11/20/24  0622 11/19/24  0505 11/18/24  1950 11/18/24  0526   MAGNESIUM mg/dL 1.8* 1.6* 1.4* 1.5* 1.7* 2.0 1.5*     Coags:  Results from last 7 days   Lab Units 11/18/24  0526 11/16/24  1719   INR  1.24* 1.07     TSH:    No components found for: \"TSH3\"  Lipid Profile:  Results from last 7 days   Lab Units 11/22/24  0424   CHOLESTEROL mg/dL 153   TRIGLYCERIDES mg/dL 101   HDL mg/dL 46   LDL CALC mg/dL 87     Hgb A1c:    NT-proBNP: No results for input(s): \"NTBNP\" in the last 72 hours.     Imaging & Testing   I have personally reviewed pertinent reports.    US right upper quadrant with liver dopplers  Result Date: 11/18/2024  Narrative: RIGHT UPPER QUADRANT ULTRASOUND WITH LIVER DOPPLER INDICATION: transaminitis. COMPARISON: Abdominal ultrasound October 26, 2022, CT chest abdomen and pelvis November 14, 2024 TECHNIQUE: Real-time ultrasound of the right upper quadrant was performed with a curvilinear transducer with both volumetric sweeps and still imaging techniques. FINDINGS: Doppler evaluation very limited due to patient inability to remain still during the exam. PANCREAS: Portions of the pancreas are obscured by bowel gas. Visualized portions of the pancreas are unremarkable. AORTA AND IVC: Visualized portions are normal for patient age. LIVER: Size: Severely enlarged. The liver measures 19.3 cm in the midclavicular line. Contour: Surface contour is smooth. Parenchyma: There is moderate diffuse increased echogenicity with smooth echotexture and acoustic beam attenuation. Most consistent with moderate hepatic steatosis. No liver mass identified. LIVER DOPPLER: Limited as described. The main portal vein and primary branch segments are patent and hepatopetal with normal spectral waveform. Hepatic veins are patent. " Spectral waveforms grossly within normal limits. Main hepatic artery appears normal size, patent with normal spectral waveform. BILIARY: Gallbladder demonstrates marked wall thickening and striations measuring up to 12 mm. There is relative contraction of the gallbladder. No gallstones are seen. Peres sign is negative. Findings most likely reflect underlying liver disease or hypoproteinemia. Acute cholecystitis is not considered likely. No intrahepatic biliary dilatation. CBD measures 5.0 mm. No choledocholithiasis. KIDNEY: Right kidney measures 11.1 x 5.6 x 5.4 cm. Volume 175.8 mL No hydronephrosis. 8 mm simple cyst upper pole. ASCITES: None. Right pleural effusion is incidentally noted.    Impression: 1. Hepatomegaly and hepatic steatosis. Doppler evaluation limited but grossly within normal limits. 2. Circumferential gallbladder wall thickening most likely related to underlying liver disease and/or hypoproteinemia. 3. Small right hepatic cyst. 4. Right pleural effusion. Workstation performed: [id number]     XR chest portable  Result Date: 11/17/2024  Narrative: XR CHEST PORTABLE INDICATION: increased WOB. COMPARISON: None FINDINGS: Mild cardiomegaly. Persistent decreased mild pulmonary edema. No pneumothorax or pleural effusion. CABG. Cervicothoracic hardware fusion. Bones are unremarkable for age. Normal upper abdomen.     Impression: Persistent decreased mild pulmonary edema. Workstation performed: [id number]     XR chest portable ICU  Result Date: 11/16/2024  Narrative: XR CHEST PORTABLE ICU INDICATION: ETT and subclavian CVC. COMPARISON: CXR 11/15/2024, chest CT 11/14/2024. FINDINGS: Moderate pulmonary edema. No pneumothorax or pleural effusion. Mild cardiomegaly, CABG. Cervicothoracic fusion. Normal upper abdomen.     Impression: No ET tube and no subclavian line. Moderate pulmonary edema. Workstation performed: [id number]     XR chest portable  Result Date: 11/15/2024  Narrative: XR CHEST PORTABLE  INDICATION: chest pain, shortness of breath. COMPARISON: November 4, 2024 FINDINGS: Sternotomy. Coronary artery bypass grafting. Clear lungs. No pneumothorax or pleural effusion. Normal cardiomediastinal silhouette. Bones are unremarkable for age. Cervical fusion hardware noted. No acute osseous abnormality.     Impression: No acute cardiopulmonary disease. Workstation performed: [id number]     CT chest abdomen pelvis wo contrast  Result Date: 11/14/2024  Narrative: CT CHEST, ABDOMEN AND PELVIS WITHOUT IV CONTRAST INDICATION: Chest and abdominal pain blunt trauma. COMPARISON: 9/14/2024. TECHNIQUE: CT examination of the chest, abdomen and pelvis was performed without intravenous contrast. Multiplanar 2D reformatted images were created from the source data. This examination, like all CT scans performed in the [facility], was performed utilizing techniques to minimize radiation dose exposure, including the use of iterative reconstruction and automated exposure control. Radiation dose length product (DLP) for this visit: 889.83 mGy-cm Enteric Contrast: Not administered. FINDINGS: CHEST LUNGS: Minimal subsegmental atelectasis at the base of the lingula. No tracheal or endobronchial lesion. PLEURA: No hemothorax or effusion. HEART/GREAT VESSELS: Cardiomegaly. Postsurgical change from CABG. No thoracic aortic aneurysm. MEDIASTINUM AND DENZEL: No hematoma. CHEST WALL AND LOWER NECK: Unremarkable. ABDOMEN LIVER/BILIARY TREE: Hepatic steatosis. GALLBLADDER: No calcified gallstones. No pericholecystic inflammatory change. SPLEEN: Unremarkable. PANCREAS: Unremarkable. ADRENAL GLANDS: Unremarkable. KIDNEYS/URETERS: Extensive renal vascular calcifications and nonobstructing 2 to 3 mm calculi. STOMACH AND BOWEL: Diverticulosis without evidence of diverticulitis or colitis. APPENDIX: Normal appendix. ABDOMINOPELVIC CAVITY: No free intraperitoneal air or hemoperitoneum. VESSELS: Calcified plaque in the abdominal  aorta. No aneurysm. PELVIS REPRODUCTIVE ORGANS: No prostate enlargement age. URINARY BLADDER: Unremarkable. ABDOMINAL WALL/INGUINAL REGIONS: Unremarkable. BONES: Left lateral seventh rib fracture with mild displacement. Possible nondisplaced left lateral eighth rib fracture. Chronic right lower rib fractures..     Impression: 1. Mildly displaced left lateral seventh rib fracture. 2. No hemothorax or pneumothorax. 3. No acute trauma in the abdomen or pelvis. Workstation performed: [id number]     CT spine cervical without contrast  Result Date: 11/14/2024  Narrative: CT CERVICAL SPINE - WITHOUT CONTRAST INDICATION:   Neck pain and trauma. COMPARISON:  None. TECHNIQUE:  CT examination of the cervical spine was performed without intravenous contrast.  Contiguous axial images were obtained. Multiplanar 2D reformatted images were created from the source data. Radiation dose length product (DLP) for this visit:  510.01 mGy-cm .  This examination, like all CT scans performed in the [facility], was performed utilizing techniques to minimize radiation dose exposure, including the use of iterative  reconstruction and automated exposure control. IMAGE QUALITY:  Diagnostic. FINDINGS: ALIGNMENT: Stable mild C7 anterolisthesis. Postsurgical change from C3-C7 laminectomies, anterior, interbody and posterior fusion VERTEBRAE:  No acute cervical spine fracture. DEGENERATIVE CHANGES: Decompression of the central canal throughout the cervical spine. Disc and facet osteophytosis results in mild to moderate neural foraminal narrowing in the mid and lower cervical spine PREVERTEBRAL AND PARASPINAL SOFT TISSUES: No swelling or hematoma THORACIC INLET: Clear lung apices.     Impression: No acute cervical spine fracture or traumatic malalignment. Workstation performed: [id number]     CT head without contrast  Result Date: 11/14/2024  Narrative: CT BRAIN - WITHOUT CONTRAST INDICATION:   Headache and head trauma. COMPARISON:  10/30/2024. TECHNIQUE:  CT examination of the brain was performed.  Multiplanar 2D reformatted images were created from the source data. Radiation dose length product (DLP) for this visit:  959.99 mGy-cm .  This examination, like all CT scans performed in the [facility], was performed utilizing techniques to minimize radiation dose exposure, including the use of iterative  reconstruction and automated exposure control. IMAGE QUALITY:  Diagnostic. FINDINGS: PARENCHYMA: No intracranial hemorrhage or mass effect. Few periventricular hypoattenuating foci may represent early microangiopathic change. VENTRICLES AND EXTRA-AXIAL SPACES: No hydrocephalus or extra-axial collection. VISUALIZED ORBITS: Intact. PARANASAL SINUSES: Clear. CALVARIUM AND EXTRACRANIAL SOFT TISSUES: No acute calvarial fracture.     Impression: No acute intracranial abnormality. Workstation performed: [id number]     XR chest 1 view portable  Result Date: 11/4/2024  Narrative: XR CHEST PORTABLE INDICATION: chest pain. COMPARISON: 10/30/2024 FINDINGS: Clear lungs. No pneumothorax or pleural effusion. Stable cardiomediastinal silhouette. CABG. Cervicothoracic spinal fixation hardware noted. Normal upper abdomen.     Impression: No acute cardiopulmonary disease. Workstation performed: [id number]     CT spine cervical without contrast  Result Date: 10/30/2024  Narrative: CT CERVICAL SPINE - WITHOUT CONTRAST INDICATION:   Fall with head strike. COMPARISON: Prior CT September 14, 2024 TECHNIQUE:  CT examination of the cervical spine was performed without intravenous contrast.  Contiguous axial images were obtained. Multiplanar 2D reformatted images were created from the source data. Radiation dose length product (DLP) for this visit:  447.54 mGy-cm .  This examination, like all CT scans performed in the [facility], was performed utilizing techniques to minimize radiation dose exposure, including the use of iterative   reconstruction and automated exposure control. IMAGE QUALITY:  Diagnostic. FINDINGS: ALIGNMENT: Postoperative changes of posterior fusion C2 through the upper thoracic spine and anterior fusion C4-C7. The inferior portion of the posterior fusion construct is not included on the study. VERTEBRAE: Multilevel decompressive laminectomy defects. Lucency surrounds the bilateral to T1 screws suggesting hardware loosening. DEGENERATIVE CHANGES: Moderate multilevel facet hypertrophy. Multilevel decompressive laminectomy defects. PREVERTEBRAL AND PARASPINAL SOFT TISSUES: Chronic fluid collection in the postsurgical bed potentially seroma unchanged from prior extending from C2-3 through C7. This is unchanged from the prior study. Unremarkable THORACIC INLET:  Normal.     Impression: Stable postoperative alignment status post extensive anterior and posterior cervical spinal fusion and multilevel laminectomy. No traumatic malalignment identified. Postsurgical chronic fluid collection in the laminectomy bed likely chronic hematoma/seroma unchanged from prior. Lucency surrounds bilateral T1 screws suggesting hardware loosening. The bilateral vertical support rods appear partially dislodged from the T2 screws bilaterally. This is unchanged from the prior study. Workstation performed: [id number]     CT head wo contrast  Result Date: 10/30/2024  Narrative: CT BRAIN - WITHOUT CONTRAST INDICATION:   Fall with head strike. COMPARISON: September 14, 2024 TECHNIQUE:  CT examination of the brain was performed.  Multiplanar 2D reformatted images were created from the source data. Radiation dose length product (DLP) for this visit:  1024.33 mGy-cm .  This examination, like all CT scans performed in the [facility], was performed utilizing techniques to minimize radiation dose exposure, including the use of iterative reconstruction and automated exposure control. IMAGE QUALITY:  Diagnostic. FINDINGS: PARENCHYMA:No intracranial  mass, mass effect or midline shift. No CT signs of acute infarction.  No acute parenchymal hemorrhage. VENTRICLES AND EXTRA-AXIAL SPACES:  Normal for the patient's age. VISUALIZED ORBITS: Normal visualized orbits. PARANASAL SINUSES: Normal visualized paranasal sinuses. CALVARIUM AND EXTRACRANIAL SOFT TISSUES: Normal.     Impression: No acute intracranial abnormality. Workstation performed: [id number]     XR chest portable  Result Date: 10/30/2024  Narrative: XR CHEST PORTABLE INDICATION: Hypoxia. COMPARISON: August 15, 2024 FINDINGS: Pulmonary vascular congestion and probable mild perihilar edema noted. No pneumothorax or pleural effusion. Heart is slightly prominent. Post CABG changes noted. Aorta is atherosclerotic. Cervicothoracic spinal fixation hardware noted. Normal upper abdomen.     Impression: Findings most suggestive of mild CHF with some perihilar edema. Workstation performed: [id number]        Cardiac testing:   Results for orders placed during the hospital encounter of 20    Echo complete with contrast if indicated    Narrative  [address] [address] [phone]    Transthoracic Echocardiogram  2D, M-mode, Doppler, and Color Doppler    Study date:  05-Dec-2020    Patient: EUNICE JUAN  MR number: [id number]  Account number: [id number]  : 1960  Age: 60 years  Gender: Male  Status: Inpatient  Location: Bedside  Height: 70 in  Weight: 159.7 lb  BP: 124/ 76 mmHg    Indications: A-Fib    Diagnoses: I48.0 - Atrial fibrillation    Sonographer:  Kenya Diane RDCS  Primary Physician:  RERE NATARAJAN  Referring Physician:  Gokul Walsh MD  Ordering Physician:  Gokul Walsh MD  Interpreting Physician:  Nehemias Pendleton DO    SUMMARY    LEFT VENTRICLE:  Systolic function was normal by visual assessment. Ejection fraction was estimated in the range of 55 % to 60 %.  There were no regional wall motion abnormalities.  There was moderate  concentric hypertrophy.  Doppler parameters were consistent with abnormal left ventricular relaxation (grade 1 diastolic dysfunction).    LEFT ATRIUM:  The atrium was moderately dilated.    MITRAL VALVE:  There was mild regurgitation.    AORTIC VALVE:  There was no evidence for stenosis.  There was no regurgitation.    TRICUSPID VALVE:  There was mild regurgitation.  Pulmonary artery systolic pressure was mildly to moderately increased.  Estimated peak PA pressure was 55 mmHg.    COMPARISONS:  There has been no significant interval change. Comparison was made with the previous study of 23-Dec-2019.    HISTORY: PRIOR HISTORY: Alcohol Abuse, DM, HTN, HLD, CAD, COPD    PROCEDURE: The procedure was performed at the bedside. This was a routine study. The transthoracic approach was used. The study included complete 2D imaging, M-mode, complete spectral Doppler, and color Doppler. The heart rate was 91 bpm,  at the start of the study. Image quality was adequate.    LEFT VENTRICLE: Size was normal. Systolic function was normal by visual assessment. Ejection fraction was estimated in the range of 55 % to 60 %. There were no regional wall motion abnormalities. There was moderate concentric hypertrophy.  DOPPLER: Doppler parameters were consistent with abnormal left ventricular relaxation (grade 1 diastolic dysfunction).    RIGHT VENTRICLE: The size was normal. Systolic function was normal. DOPPLER: Systolic pressure was within the normal range.    LEFT ATRIUM: The atrium was moderately dilated.    RIGHT ATRIUM: The atrium was mildly dilated.    MITRAL VALVE: Valve structure was normal. There was normal leaflet separation. No echocardiographic evidence for prolapse. DOPPLER: The transmitral velocity was within the normal range. There was no evidence for stenosis. There was mild  regurgitation.    AORTIC VALVE: The valve was trileaflet. Leaflets exhibited normal thickness and normal cuspal separation. DOPPLER: Transaortic  "velocity was within the normal range. There was no evidence for stenosis. There was no regurgitation.    TRICUSPID VALVE: The valve structure was normal. There was normal leaflet separation. DOPPLER: The transtricuspid velocity was within the normal range. There was mild regurgitation. Pulmonary artery systolic pressure was mildly to  moderately increased. Estimated peak PA pressure was 55 mmHg.    PULMONIC VALVE: Leaflets exhibited normal thickness, no calcification, and normal cuspal separation. DOPPLER: The transpulmonic velocity was within the normal range. There was no regurgitation.    PERICARDIUM: There was no thickening. There was no pericardial effusion.    AORTA: The root exhibited normal size.    PULMONARY ARTERY: The size was normal. The morphology appeared normal.    SYSTEM MEASUREMENT TABLES    2D  EF (Teich): 59.98 %    PW  MV E/A Ratio: 3.5    [employer]tersoci[employer] Commission Accredited Echocardiography Laboratory    Prepared and electronically signed by    Nehemias Pendleton DO  Signed 07-Dec-2020 10:55:08              Dr. Carley Hernandez MD, Merged with [facility]      \"This note has been constructed using a voice recognition system.Therefore there may be syntax, spelling, and/or grammatical errors. Please call if you have any questions. \"  "

## 2024-11-23 NOTE — ASSESSMENT & PLAN NOTE
Chronic hyponatremia likely secondary to alcohol use  Sodium 129 11/17, was briefly on 1.8% NSS which was d/c'ed once sodium reached 135  Sodium 129 today    Increase salt tablet to 2 g twice daily  Continue to trend   Close I&O

## 2024-11-23 NOTE — ASSESSMENT & PLAN NOTE
Chronic, noting hx of several falls in setting of chronic alcohol abuse - hematomas noted diffusely on skin. PT/OT ordered    CT head wo contrast: no acute intracranial abnormality  CT C-spine wo contrast: No acute cervical spine fracture or traumatic malalignment.  CT chest/abd/pelvis wo contrast: mildly displaced left lateral 7th rib fracture, no hemothorax/pneumothorax, no acute trauma in abdomen or pelvis     -PT/OT recommend home PT, patient refusing.

## 2024-11-23 NOTE — CASE MANAGEMENT
Case Management Discharge Planning Note    Patient name Gregg Partida  Location 4 [place] [facility]/4 [place] [facility]-* MRN [id number]  : 1960 Date 2024       Current Admission Date: 2024  Current Admission Diagnosis:Atrial fibrillation with RVR (HCC)   Patient Active Problem List    Diagnosis Date Noted Date Diagnosed    Chronic hyponatremia 2024     Closed fracture of one rib of left side 2024     Ambulatory dysfunction 2024     Financial insecurity 2024     Iron deficiency 2024     BPH (benign prostatic hyperplasia) 2024     Chronic alcohol use 2024     Fall, initial encounter 2024     Hypertension 2024     Hemorrhagic cystitis 2024     Hypothyroidism 2024     Gastrointestinal hemorrhage with melena 10/29/2023     Abnormal thyroid stimulating hormone level 2023     Chest pain 2023     Paroxysmal atrial fibrillation (HCC) 2023     GERD (gastroesophageal reflux disease) 2023     Sleep apnea 2023     Stable angina (HCC) 2022     Stage 3a chronic kidney disease (HCC) 2022     Fatty liver, alcoholic 04/15/2022     Nonischemic nontraumatic myocardial injury 04/10/2022     History of atrial fibrillation 10/25/2021     Mild protein-calorie malnutrition (HCC) 2021     Prostate cancer (HCC) 2021     Atrial fibrillation with RVR (HCC) 2020     Chronic heart failure with preserved ejection fraction (HCC) 2019     Noncompliance 2019     Alcohol abuse with withdrawal delirium (HCC) 2019     Alcohol withdrawal syndrome with complication (HCC) 10/17/2019     Electrolyte abnormality 10/17/2019     Cervical spinal stenosis 10/17/2019     History of prostate cancer 2019     CAD (coronary artery disease) 2019     Nicotine abuse 2019     Hypomagnesemia 10/10/2018     Depression, recurrent (HCC) 10/10/2018     Hx of CABG 10/10/2018     Dyslipidemia 10/10/2018      COPD (chronic obstructive pulmonary disease) (HCC) 10/09/2018     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 disease (HCC) 10/09/2018     Hypertensive heart and chronic kidney disease with heart failure and stage 1 through stage 4 chronic kidney disease, or chronic kidney disease (HCC) 10/09/2018       LOS (days): 9  Geometric Mean LOS (GMLOS) (days): 3.4  Days to GMLOS:-5.9     OBJECTIVE:  Risk of Unplanned Readmission Score: 64.66         Current admission status: Inpatient   Preferred Pharmacy:   [facility] [place], [place] - [address] [address]  Phone: [phone] Fax: [phone]    [facility] [address] [place] [address] - [address]  [address]  [facility] [address]  Phone: [phone] Fax: [phone]    Primary Care Provider: Lilliana Bliss MD    Primary Insurance: [id number] REP  Secondary Insurance:     DISCHARGE DETAILS:    Discharge planning discussed with:: Patient  Freedom of Choice: Yes  Comments - Freedom of Choice: Offered HHC per therqpy recs, patient refused stating he does not need physical therapy     Requested Home Health Care         Is the patient interested in HHC at discharge?: No    DME Referral Provided  Referral made for DME?: No    Other Referral/Resources/Interventions Provided:  Referral Comments: RN DESIREE spoke with patient at bedside to review IMM # 2 and discuss home rehab per therapy recs. Patient refused home rehab stating he does not need any physical therapy.     Treatment Team Recommendation: Outpatient Rehab, Home with Home Health Care  Discharge Destination Plan:: Home      IMM Given (Date):: 11/23/24 (IMM # 2 reviewed with patient. Patient verbalized understanding and signed the IMM. A copy of IMM provided to patient and a copy placed in scan bin for chart.)

## 2024-11-23 NOTE — ASSESSMENT & PLAN NOTE
Wt Readings from Last 3 Encounters:   11/23/24 85.2 kg (187 lb 12.8 oz)   09/19/24 86.2 kg (190 lb 1.6 oz)   09/16/24 83.3 kg (183 lb 10.3 oz)   8/16/2024 TTE: EF visibly estimated 65% with severe left atrial dilatation  BNP 1150  continue PRN diuretics  increase Lopressor to 200 mg BID  monitor I and O, daily weights and labs  CHF education

## 2024-11-23 NOTE — DISCHARGE SUMMARY
Discharge Summary - Family Medicine   Name: Gregg Partida 64 y.o. male I MRN: [id number]  Unit/Bed#: 4 [facility] 420-01 I Date of Admission: 11/14/2024   Date of Service: 11/24***/2024 I Hospital Day: 10     Medical Problems         Resolved Problems  Date Reviewed: 11/19/2024            Resolved     Thrombocytopenia (HCC) 11/22/2024       Resolved by  Pablito Presley MD     High anion gap metabolic acidosis 11/16/2024       Resolved by  CHELSEA Christopher     Encephalopathy acute 11/22/2024       Resolved by  Pablito Presley MD     Transaminitis 11/22/2024       Resolved by  Pablito Presley MD     Acute kidney injury superimposed on chronic kidney disease  (HCC) 11/16/2024       Resolved by  CHELSEA Christopher     Alcohol withdrawal (HCC) 11/22/2024       Resolved by  Pablito Presley MD     Acute respiratory insufficiency 11/22/2024       Resolved by  Pablito Presley MD         Discharging Physician / Practitioner: Pamela Esquivel MD  PCP: Lilliana Bliss MD  Admission Date:   Admission Orders (From admission, onward)          Ordered         11/14/24 0433   INPATIENT ADMISSION  Once                               Discharge Date: 11/24***/24     Consultations During Hospital Stay:  ICU. Cardio     Procedures Performed:   none     Significant Findings / Test Results:   11/23: Na 129, Mg 1.8, K 4.2, Hgb 9.9  11/22: Na 129, Mg 1.6, Hgb 9.7  11/21: Na 128, Mag 1.4, BC no growth @ 4 days, Alk phos 110, Alb 3.0, Hb 9.7  11/20:  ALT 55, AST 42, Hgb 9.3, Mg 1.5  11/19: ALT 65, AST 52, , Hgb 10,   11/18: ALT 71, AST 63, , Hgb 10.5  11/17:   11/15: Na 131, BNP 1150, Hgb 10.4, WBC 6.14, Cr 0.91  11/14 @ 12am: Na 130, Cl 89, CO2 20, AG 21, BUN 37, Cr 1.60, Ca 7.6, AST 82, , 0h troponin 38, WBC wnl, Hgb 12.6, PLT 96, PT 15.1, INR 1.13, EtOH 381  11/14 @ 2am: Na 130, AG 17, BUN 35, Cr 1.44, Mg 1.1, ionized Ca 0.85     -CT head 11/14: No acute intracranial abnormality.  -CT cervical  spine 11/14: No acute cervical spine fracture or malalignment.  -CT chest/abdomen 11/14: Mildly displaced left lateral seventh rib fracture, no acute trauma  -RUQ US: 1. Hepatomegaly and hepatic steatosis. 2. Circumferential gallbladder wall thickening most likely related to underlying liver disease and/or hypoproteinemia. 3. Small right hepatic cyst. 4. Right pleural effusion.     Incidental Findings:   none      Test Results Pending at Discharge (will require follow up):   none     Outpatient Tests Requested:  BMP in one week     Complications:  none     Reason for Admission: alcohol intoxication, afib with RVE     Things to follow up on:   Are you taking your digoxin?  Are you taking your metoprolol?  Have you followed up with cardiology?  Are you taking your salt tablets?  Have you repeated your BMP?     Hospital Course:   Gregg Partida is a 64 y.o. male patient who originally presented to the hospital on 11/14/2024 due to alcohol intoxication and afib with RVR     Gregg Partida is a 64-year-old male with PMH of A.fib w/ RVR, alcohol abuse, COPD, T2DM w/ CKD, CHF, BPH who presented to the hospital on 11/14/2024 after being found with acute alcohol intoxication and A.fib w/ RVR and was admitted to ICU for management of afib, electrolyte abnormalities and CIWA monitoring. Patient was transferred to [facility], but was returned to ICU due acute encephalopathy secondary to ETOH withdrawal and increased ativan requirement. Patient was placed on telemetry, attempted rate control with Cardizem drip, and cardiology was consulted. Patient declined offer for cardioversion by cardiology. Improved mentation was achieved after several doses of phenobarb and patient was deemed appropriate for transfer to [facility]. Despite max dose cardizem drip patient remained in persistent a-fib and was transitioned to digoxin loading and subsequently achieved rate control with daily digoxin and increased dose of metoprolol. Patient has  remained afebrile and hemodynamically stable for discharge. Pt was discharged with increased metoprolol of 200 mg BID and new digoxin 125mcg daily. Pt was advised with alcohol cessation and follow up with PCP and cardiology.  Patient now tolerating diet and vitally stable and ready for discharge.             Atrial fibrillation with RVR (HCC)  Presented to ED with afib RVR, HR > 140 sustained. EKG afib RVR with 's. Patient noting he has not been compliant with home eliquis or lopressor 50 mg BID.  In ED: s/p lopressor 5 mg x 2, cardizem 15 mg x 1.Patient intermittently required Cardizem drip.  He refused cardioversion.     -Cardiology consulted.  Recommended outpatient follow-up.  -Continue home Eliquis 5 mg BID   -Continue lopressor 200mg BID  -Continue PO digoxin 125mcg daily      COPD (chronic obstructive pulmonary disease) (HCC)  Chronic, stable, presenting with cough that patient reports is at baseline. He is non compliant with home inhalers  Patient received 2 doses of p.o. prednisone 40 mg in the ED.     Plan:  -continue home Breo  -Continue albuterol as needed     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 disease (HCC)  Chronic, home medications include metformin 500 mg QD. Hba1c 5.2.  Blood sugars were well-controlled on insulin sliding scale.     Plan:  -Restart home metformin     Thrombocytopenia (HCC) (Resolved: 11/22/2024)  Plt 96 on admission, similar to baseline. Suspect 2/2 chronic alcohol abuse.     Plan:  -Continue home ASA and Eliquis     Electrolyte abnormality  Admission Na 130, Cl 89, bicarb 20, BUN 37, Ca 7.6 POA in setting of chronic alcohol abuse and starvation.      Continue salt tablet 2 g twice daily  Continue magnesium 800 mg twice daily.     Chronic heart failure with preserved ejection fraction (HCC)  Chronic, appears hypovolemic on admission - no evidence of acute exacerbation. Home medications include lopressor 50 mg BID and ranexa 500mg BID.  BNP 1150 POA.      Plan:  Continue daily weights  Home Lopressor increased to 200 mg BID   Continue home Ranexa 500mg BID     Alcohol withdrawal (HCC) (Resolved: 11/22/2024)  Presented with acute intoxication and noting 4 day binge of significant alcohol use. Hx of several admissions for similar complaints in the past.  Noncompliant with home naltrexone.  Ethanol 381 on admission.  Patient required multiple doses of Ativan during admission.     Recommended alcohol cessation     Ambulatory dysfunction  Chronic, noting hx of several falls in setting of chronic alcohol abuse - hematomas noted diffusely on skin. PT/OT ordered     CT head wo contrast: no acute intracranial abnormality  CT C-spine wo contrast: No acute cervical spine fracture or traumatic malalignment.  CT chest/abd/pelvis wo contrast: mildly displaced left lateral 7th rib fracture, no hemothorax/pneumothorax, no acute trauma in abdomen or pelvis      Continue PT outpatient     BPH (benign prostatic hyperplasia)  Chronic, stable     continue home flomax 0.4 mg     Closed fracture of one rib of left side  CT C/A/P: Mildly displaced left lateral seventh rib fracture.      Chronic hyponatremia  Chronic hyponatremia likely secondary to alcohol use     Continue salt tablets 2 g twice daily     Encephalopathy acute (Resolved: 11/22/2024)  Presented with acute encephalopathy likely secondary to alcohol withdrawal.  Received phenobarbital in ICU then multiple doses of Ativan while on the floor.  Mentation improved throughout this hospitalization.     Transaminitis (Resolved: 11/22/2024)  Increasing transaminitis 11/16, most likely related to alcoholic hepatitis  11/18 RUQ US- Hepatomegaly and hepatic steatosis. Circumferential gallbladder wall thickening most likely related to underlying liver disease and/or hypoproteinemia. Small right hepatic cyst.  LFTs now downtrending again, no abdominal pain on exam     Acute respiratory insufficiency (Resolved: 11/22/2024)  Pt was on 4L NC  " in ICU.  Saturating adequately on room air.  11/17 CXR \"Persistent decreased mild pulmonary edema\"      High anion gap metabolic acidosis (Resolved: 11/16/2024)  Suspect related to starvation ketosis      Resolved after IVF     Acute kidney injury superimposed on chronic kidney disease  (HCC) (Resolved: 11/16/2024)  Cr 1.60 on admission, baseline 1 -1.2. Suspect secondary to dehydration, starvation acidosis, significant alcohol abuse.     UA: neg     ENMA resolved, Cr. 0.77 after IVF     Please see above list of diagnoses and related plan for additional information.      Condition at Discharge: stable     Discharge Day Visit / Exam:   Subjective: Pt was seen and examined at the bedside, NAD. Pt is still tired, but Pt did not have any other acute complaints. Pt had BM today and urinating well     Vitals: Blood Pressure: (!) 184/93 (11/23/24 1118)  Pulse: (!) 52 (11/23/24 1118)  Temperature: 97.6 °F (36.4 °C) (11/23/24 1114)  Temp Source: Tympanic (11/21/24 1500)  Respirations: 20 (11/23/24 1114)  Height: 6' 2\" (188 cm) (11/14/24 0900)  Weight - Scale: 85.2 kg (187 lb 12.8 oz) (11/23/24 0554)  SpO2: 91 % (11/23/24 1118)  Physical Exam  Vitals reviewed.   Constitutional:       General: He is not in acute distress.     Appearance: Normal appearance. He is not ill-appearing.   HENT:      Head: Normocephalic and atraumatic.      Right Ear: External ear normal.      Left Ear: External ear normal.      Nose: Nose normal. No congestion or rhinorrhea.      Mouth/Throat:      Mouth: Mucous membranes are moist.      Pharynx: Oropharynx is clear. No oropharyngeal exudate or posterior oropharyngeal erythema.   Eyes:      General:         Right eye: No discharge.         Left eye: No discharge.      Conjunctiva/sclera: Conjunctivae normal.   Cardiovascular:      Rate and Rhythm: Tachycardia present. Rhythm irregular.      Pulses: Normal pulses.      Heart sounds: Normal heart sounds. No murmur heard.     No friction rub. No " gallop.   Pulmonary:      Effort: Pulmonary effort is normal. No respiratory distress.      Breath sounds: Normal breath sounds. No stridor. No wheezing, rhonchi or rales.   Chest:      Chest wall: No tenderness.   Abdominal:      General: Abdomen is flat. Bowel sounds are normal. There is no distension.      Palpations: Abdomen is soft.      Tenderness: There is no abdominal tenderness. There is no guarding.   Musculoskeletal:         General: No swelling, tenderness, deformity or signs of injury. Normal range of motion.      Cervical back: Normal range of motion and neck supple. No rigidity or tenderness.      Right lower leg: No edema.      Left lower leg: No edema.   Lymphadenopathy:      Cervical: No cervical adenopathy.   Skin:     General: Skin is warm and dry.      Capillary Refill: Capillary refill takes less than 2 seconds.      Findings: No bruising, erythema, lesion or rash.   Neurological:      General: No focal deficit present.      Mental Status: He is alert and oriented to person, place, and time. Mental status is at baseline.      Motor: No weakness.      Gait: Gait normal.      Deep Tendon Reflexes: Reflexes normal.   Psychiatric:         Mood and Affect: Mood normal.         Behavior: Behavior normal.         Thought Content: Thought content normal.            Discussion with Family: Patient declined call to .      Discharge instructions/Information to patient and family:   See after visit summary for information provided to patient and family.       Provisions for Follow-Up Care:  See after visit summary for information related to follow-up care and any pertinent home health orders.       Mobility at time of Discharge:   Basic Mobility Inpatient Raw Score: 19  JH-HLM Goal: 6: Walk 10 steps or more  JH-HLM Achieved: 8: Walk 250 feet ot more  HLM Goal achieved. Continue to encourage appropriate mobility.     Disposition:   Home     Planned Readmission: none     Discharge  Medications:  See after visit summary for reconciled discharge medications provided to patient and/or family.

## 2024-11-23 NOTE — ASSESSMENT & PLAN NOTE
Lab Results   Component Value Date    HGBA1C 5.2 11/14/2024       Recent Labs     11/22/24  1102 11/22/24  1605 11/22/24 2032 11/23/24  0709   POCGLU 112 117 147* 130       Blood Sugar Average: Last 72 hrs:  (P) [id number]  managed per primary team

## 2024-11-24 LAB
ALBUMIN SERPL BCG-MCNC: 3.4 G/DL (ref 3.5–5)
ALP SERPL-CCNC: 131 U/L (ref 34–104)
ALT SERPL W P-5'-P-CCNC: 61 U/L (ref 7–52)
ANION GAP SERPL CALCULATED.3IONS-SCNC: 8 MMOL/L (ref 4–13)
AST SERPL W P-5'-P-CCNC: 38 U/L (ref 13–39)
BASOPHILS # BLD AUTO: 0.1 THOUSANDS/ΜL (ref 0–0.1)
BASOPHILS NFR BLD AUTO: 1 % (ref 0–1)
BILIRUB SERPL-MCNC: 0.46 MG/DL (ref 0.2–1)
BUN SERPL-MCNC: 18 MG/DL (ref 5–25)
CALCIUM ALBUM COR SERPL-MCNC: 9.2 MG/DL (ref 8.3–10.1)
CALCIUM SERPL-MCNC: 8.7 MG/DL (ref 8.4–10.2)
CHLORIDE SERPL-SCNC: 99 MMOL/L (ref 96–108)
CO2 SERPL-SCNC: 24 MMOL/L (ref 21–32)
CREAT SERPL-MCNC: 0.81 MG/DL (ref 0.6–1.3)
EOSINOPHIL # BLD AUTO: 0.06 THOUSAND/ΜL (ref 0–0.61)
EOSINOPHIL NFR BLD AUTO: 1 % (ref 0–6)
ERYTHROCYTE [DISTWIDTH] IN BLOOD BY AUTOMATED COUNT: 15.9 % (ref 11.6–15.1)
GFR SERPL CREATININE-BSD FRML MDRD: 95 ML/MIN/1.73SQ M
GLUCOSE SERPL-MCNC: 105 MG/DL (ref 65–140)
GLUCOSE SERPL-MCNC: 105 MG/DL (ref 65–140)
GLUCOSE SERPL-MCNC: 146 MG/DL (ref 65–140)
GLUCOSE SERPL-MCNC: 160 MG/DL (ref 65–140)
GLUCOSE SERPL-MCNC: 163 MG/DL (ref 65–140)
HCT VFR BLD AUTO: 31.9 % (ref 36.5–49.3)
HGB BLD-MCNC: 10.3 G/DL (ref 12–17)
IMM GRANULOCYTES # BLD AUTO: 0.05 THOUSAND/UL (ref 0–0.2)
IMM GRANULOCYTES NFR BLD AUTO: 1 % (ref 0–2)
LYMPHOCYTES # BLD AUTO: 1.2 THOUSANDS/ΜL (ref 0.6–4.47)
LYMPHOCYTES NFR BLD AUTO: 16 % (ref 14–44)
MAGNESIUM SERPL-MCNC: 1.6 MG/DL (ref 1.9–2.7)
MCH RBC QN AUTO: 32.2 PG (ref 26.8–34.3)
MCHC RBC AUTO-ENTMCNC: 32.3 G/DL (ref 31.4–37.4)
MCV RBC AUTO: 100 FL (ref 82–98)
MONOCYTES # BLD AUTO: 1.33 THOUSAND/ΜL (ref 0.17–1.22)
MONOCYTES NFR BLD AUTO: 18 % (ref 4–12)
NEUTROPHILS # BLD AUTO: 4.61 THOUSANDS/ΜL (ref 1.85–7.62)
NEUTS SEG NFR BLD AUTO: 63 % (ref 43–75)
NRBC BLD AUTO-RTO: 0 /100 WBCS
PLATELET # BLD AUTO: 270 THOUSANDS/UL (ref 149–390)
PMV BLD AUTO: 10.7 FL (ref 8.9–12.7)
POTASSIUM SERPL-SCNC: 4.2 MMOL/L (ref 3.5–5.3)
PROT SERPL-MCNC: 6.7 G/DL (ref 6.4–8.4)
RBC # BLD AUTO: 3.2 MILLION/UL (ref 3.88–5.62)
SODIUM SERPL-SCNC: 131 MMOL/L (ref 135–147)
WBC # BLD AUTO: 7.35 THOUSAND/UL (ref 4.31–10.16)

## 2024-11-24 PROCEDURE — 85025 COMPLETE CBC W/AUTO DIFF WBC: CPT

## 2024-11-24 PROCEDURE — 99232 SBSQ HOSP IP/OBS MODERATE 35: CPT | Performed by: INTERNAL MEDICINE

## 2024-11-24 PROCEDURE — 82948 REAGENT STRIP/BLOOD GLUCOSE: CPT

## 2024-11-24 PROCEDURE — 80053 COMPREHEN METABOLIC PANEL: CPT

## 2024-11-24 PROCEDURE — 83735 ASSAY OF MAGNESIUM: CPT

## 2024-11-24 RX ORDER — MAGNESIUM SULFATE HEPTAHYDRATE 40 MG/ML
4 INJECTION, SOLUTION INTRAVENOUS ONCE
Status: COMPLETED | OUTPATIENT
Start: 2024-11-24 | End: 2024-11-24

## 2024-11-24 RX ADMIN — FOLIC ACID 1 MG: 1 TABLET ORAL at 09:02

## 2024-11-24 RX ADMIN — DIGOXIN 125 MCG: 125 TABLET ORAL at 09:02

## 2024-11-24 RX ADMIN — Medication 800 MG: at 17:02

## 2024-11-24 RX ADMIN — MAGNESIUM SULFATE HEPTAHYDRATE 4 G: 40 INJECTION, SOLUTION INTRAVENOUS at 06:20

## 2024-11-24 RX ADMIN — FERROUS SULFATE TAB 325 MG (65 MG ELEMENTAL FE) 325 MG: 325 (65 FE) TAB at 07:55

## 2024-11-24 RX ADMIN — THIAMINE HCL TAB 100 MG 100 MG: 100 TAB at 09:02

## 2024-11-24 RX ADMIN — APIXABAN 5 MG: 5 TABLET, FILM COATED ORAL at 17:02

## 2024-11-24 RX ADMIN — RANOLAZINE 500 MG: 500 TABLET, FILM COATED, EXTENDED RELEASE ORAL at 22:31

## 2024-11-24 RX ADMIN — INSULIN LISPRO 1 UNITS: 100 INJECTION, SOLUTION INTRAVENOUS; SUBCUTANEOUS at 11:48

## 2024-11-24 RX ADMIN — SODIUM CHLORIDE 2 G: 1 TABLET ORAL at 07:47

## 2024-11-24 RX ADMIN — SODIUM CHLORIDE 2 G: 1 TABLET ORAL at 17:02

## 2024-11-24 RX ADMIN — Medication 800 MG: at 09:01

## 2024-11-24 RX ADMIN — INSULIN LISPRO 1 UNITS: 100 INJECTION, SOLUTION INTRAVENOUS; SUBCUTANEOUS at 21:38

## 2024-11-24 RX ADMIN — METOPROLOL TARTRATE 200 MG: 100 TABLET, FILM COATED ORAL at 20:15

## 2024-11-24 RX ADMIN — APIXABAN 5 MG: 5 TABLET, FILM COATED ORAL at 09:02

## 2024-11-24 RX ADMIN — TRIMETHOBENZAMIDE HYDROCHLORIDE 200 MG: 100 INJECTION INTRAMUSCULAR at 08:57

## 2024-11-24 RX ADMIN — PANTOPRAZOLE SODIUM 40 MG: 40 TABLET, DELAYED RELEASE ORAL at 17:02

## 2024-11-24 RX ADMIN — FLUTICASONE FUROATE AND VILANTEROL TRIFENATATE 1 PUFF: 200; 25 POWDER RESPIRATORY (INHALATION) at 09:04

## 2024-11-24 RX ADMIN — RANOLAZINE 500 MG: 500 TABLET, FILM COATED, EXTENDED RELEASE ORAL at 11:48

## 2024-11-24 RX ADMIN — GABAPENTIN 300 MG: 300 CAPSULE ORAL at 09:02

## 2024-11-24 RX ADMIN — GLYCERIN 1 DROP: .002; .002; .01 SOLUTION/ DROPS OPHTHALMIC at 09:03

## 2024-11-24 RX ADMIN — METOPROLOL TARTRATE 200 MG: 100 TABLET, FILM COATED ORAL at 09:01

## 2024-11-24 RX ADMIN — Medication 6 MG: at 21:38

## 2024-11-24 RX ADMIN — ASPIRIN 81 MG CHEWABLE TABLET 81 MG: 81 TABLET CHEWABLE at 09:02

## 2024-11-24 RX ADMIN — TAMSULOSIN HYDROCHLORIDE 0.4 MG: 0.4 CAPSULE ORAL at 09:02

## 2024-11-24 RX ADMIN — PANTOPRAZOLE SODIUM 40 MG: 40 TABLET, DELAYED RELEASE ORAL at 06:20

## 2024-11-24 NOTE — ASSESSMENT & PLAN NOTE
Plt 96 on admission, similar to baseline. Suspect 2/2 chronic alcohol abuse.  Plt 203 today resolved     Plan:  -Continued home ASA at discharge  -Monitor for bleeding  -Continued home eliquis at discharge

## 2024-11-24 NOTE — PROGRESS NOTES
Progress Note - Family Medicine   Name: Gregg Partida 64 y.o. male I MRN: [id number]  Unit/Bed#: 4 [facility] 420-01 I Date of Admission: 11/14/2024   Date of Service: 11/24/2024 I Hospital Day: 10       Assessment & Plan  Atrial fibrillation with RVR (HCC) (Primary)  Presented to ED with afib RVR, HR > 140 sustained. EKG afib RVR with 's. Patient noting he has not been compliant with home eliquis or lopressor 50 mg BID.    In ED: s/p lopressor 5 mg x 2, cardizem 15 mg x 1  11/15 OVN: Cardizem x2 Lopressor x1    Plan:  - Pt was on Cardizem gtt  - Pt refuses cardioversion.   -Cardizem 30 mg every 6 hours and Cardizem gtt. were attempted but patient was persistently in RVR  - s/p digoxin x 2, rate started to come down    - Cardiology consulted, appreciate recs  - Continued digoxin 125 mcg daily at discharge  - Continued home Eliquis 5 mg BID at discharge  - Continued lopressor 200mg BID at discharge  - Unable to contact patient's son to  the meds yesterday, medication will be delivered to Pt's home by pharmacy tomorrow. No deliveries on Sunday  -will discharge to home tomorrow     COPD (chronic obstructive pulmonary disease) (HCC)  Chronic, stable, presenting with cough that patient reports is at baseline - noting he is non compliant with home inhalers  Patient received 2 doses of p.o. prednisone 40 mg    Plan:  -continued home Breo at discharge  -continued home PRN albuterol at discharge    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 disease (HCC)  Chronic, home medications include metformin 500 mg QD. Hba1c 5.2.     Plan:  -Pt was on ISS during hospitalization  -continued home metformin at discharge    Electrolyte abnormality  Na 130, Cl 89, bicarb 20, BUN 37, Ca 7.6 POA in setting of chronic alcohol abuse and starvation.     Replete as needed  Discharged on salt tablets 2 g twice daily and mag-ox 800mg BID    Chronic heart failure with preserved ejection fraction (HCC)  Chronic, appears  hypovolemic on admission - no evidence of acute exacerbation. Home medications include lopressor 50 mg BID and ranexa 500mg BID    BNP 1150 POA    Plan:  Cardiology consulted  Daily weights, I&O's  increased home Lopressor to 200mg BID and continued home Ranexa 500mg BID at discharge    Ambulatory dysfunction  Chronic, noting hx of several falls in setting of chronic alcohol abuse - hematomas noted diffusely on skin. PT/OT ordered    CT head wo contrast: no acute intracranial abnormality  CT C-spine wo contrast: No acute cervical spine fracture or traumatic malalignment.  CT chest/abd/pelvis wo contrast: mildly displaced left lateral 7th rib fracture, no hemothorax/pneumothorax, no acute trauma in abdomen or pelvis     -PT/OT recommend home PT, patient refused Home PT.    BPH (benign prostatic hyperplasia)  Chronic, stable    continued home flomax 0.4 mg at discharge    Closed fracture of one rib of left side  CT C/A/P: Mildly displaced left lateral seventh rib fracture.     -Pain control with PRN tylenol  -incentive spirometry during hospitalization  -conservative management at discharge    Chronic hyponatremia  Chronic hyponatremia likely secondary to alcohol use  Sodium 129 11/17, was briefly on 1.8% NSS which was d/c'ed once sodium reached 135  Sodium 131 today improving    Discharged with salt tablet to 2 g twice daily    Thrombocytopenia (HCC) (Resolved: 11/22/2024)  Plt 96 on admission, similar to baseline. Suspect 2/2 chronic alcohol abuse.  Plt 203 today resolved     Plan:  -Continued home ASA at discharge  -Monitor for bleeding  -Continued home eliquis at discharge  High anion gap metabolic acidosis (Resolved: 11/16/2024)  Suspect related to starvation ketosis     Resolved after IVF    Encephalopathy acute (Resolved: 11/22/2024)  11/15-16: More encephalopathic overnight, likely secondary to alcohol withdrawal. Patient confused, hallucinating. Non-focal. Given multiple doses of ativan per CIWA protocol.  Transferred to ICU for repeat phenobarb dosing  Received 130mg of phenobarb, started on precedex  11/17: 65mg of phenobarb  11/18: 65mg phenobarb x2  Remains encephalopathic since admission, oriented to self, year, and place at times.  Goal to wean precedex to off- currently at 1mcg/kg/hr  Consider other etiologies of encephalopathy  Considered CT head, however currently non focal  Infectious workup unremarkable, however concern for possible aspiration this stay   Ammonia 36  Consideration for baseline confusion/agitation given previous hospital notes   Mental status waxing/waning but overall less agitated  Mentation at baseline and tolerating diet well at discharge.  Transaminitis (Resolved: 11/22/2024)  Increasing transaminitis 11/16, most likely related to alcoholic hepatitis  11/18 RUQ US- Hepatomegaly and hepatic steatosis. Circumferential gallbladder wall thickening most likely related to underlying liver disease and/or hypoproteinemia. Small right hepatic cyst.  LFTs now downtrending again, no abdominal pain on exam    -trended LFTs  -resolved    Acute kidney injury superimposed on chronic kidney disease  (HCC) (Resolved: 11/16/2024)  Cr 1.60 on admission, baseline 1 -1.2. Suspect secondary to dehydration, starvation acidosis, significant alcohol abuse.    UA: neg    ENMA resolved, Cr. 0.77 after IVF    Alcohol withdrawal (HCC) (Resolved: 11/22/2024)  Presenting with acute intoxication and noting 4 day binge of significant alcohol use. Hx of several admissions for similar complaints in the past. Unable to assess compliance with home naltrexone     Ethanol on admission: 381  Stopped IVF since Pt started to tolerate diet   Multiple doses of phenobarbital in ICU    Plan:  -Pt was on CIWA protocol   -Seizure precautions, fall precautions, aspiration precautions  -Tigan PRN was given for nausea during hospitalization due to Qt prolongation   -continued home thiamine, folate at discharge    Acute respiratory  "insufficiency (Resolved: 11/22/2024)  Pt was on 4L NC  in ICU  11/17 CXR \"Persistent decreased mild pulmonary edema\"     Continue nebs  Hold steroids given no wheezing on exam   Pulmonary hygiene  Aspiration precautions  Failed speech eval 11/18 and required frequent NT suctioning, now NPO  11/19 passed speech eval and stared on diet, tolerating well.  Pt is currently on RA, resolved     VTE Pharmacologic Prophylaxis: VTE Score: 3 Moderate Risk (Score 3-4) - Pharmacological DVT Prophylaxis Ordered: apixaban (Eliquis).    Mobility:   Basic Mobility Inpatient Raw Score: 24  JH-HLM Goal: 8: Walk 250 feet or more  JH-HLM Achieved: 6: Walk 10 steps or more  JH-HLM Goal NOT achieved. Continue with multidisciplinary rounding and encourage appropriate mobility to improve upon JH-HLM goals. Pt refused home PT    Patient Centered Rounds: I performed bedside rounds with nursing staff today.   Discussions with Specialists or Other Care Team Provider: Cardio    Education and Discussions with Family / Patient: Patient declined call to .     Current Length of Stay: 10 day(s)  Current Patient Status: Inpatient   Certification Statement: The patient will continue to require additional inpatient hospital stay due to not able to get medications to home today  Discharge Plan: Anticipate discharge tomorrow to Home    Code Status: Level 1 - Full Code    Subjective   Pt was not able to discharge him yesterday due to not able to reach his son to  the medication. Pt was seen and examined at the bedside, NAD. Pt is nauseous this morning and vomited breakfast this morning. Pt did not have any other acute complaints. Pt had good BM and urination today.      Objective :  Temp:  [97.6 °F (36.4 °C)-98.6 °F (37 °C)] 97.9 °F (36.6 °C)  HR:  [51-98] 78  BP: (146-193)/() 155/87  Resp:  [16-20] 18  SpO2:  [90 %-96 %] 96 %  O2 Device: None (Room air)    Body mass index is 23.62 kg/m².     Input and Output Summary (last 24 " hours):     Intake/Output Summary (Last 24 hours) at 11/24/2024 0918  Last data filed at 11/24/2024 0445  Gross per 24 hour   Intake 310 ml   Output 2200 ml   Net -1890 ml       Physical Exam  Vitals reviewed.   Constitutional:       General: He is not in acute distress.     Appearance: Normal appearance. He is not ill-appearing.   HENT:      Head: Normocephalic and atraumatic.      Right Ear: External ear normal.      Left Ear: External ear normal.      Nose: Nose normal. No congestion or rhinorrhea.      Mouth/Throat:      Mouth: Mucous membranes are moist.      Pharynx: Oropharynx is clear. No oropharyngeal exudate or posterior oropharyngeal erythema.   Eyes:      General:         Right eye: No discharge.         Left eye: No discharge.      Conjunctiva/sclera: Conjunctivae normal.   Cardiovascular:      Rate and Rhythm: Tachycardia present. Rhythm irregular.      Pulses: Normal pulses.      Heart sounds: Normal heart sounds. No murmur heard.     No friction rub. No gallop.   Pulmonary:      Effort: Pulmonary effort is normal. No respiratory distress.      Breath sounds: Normal breath sounds. No stridor. No wheezing, rhonchi or rales.   Chest:      Chest wall: No tenderness.   Abdominal:      General: Abdomen is flat. Bowel sounds are normal. There is no distension.      Palpations: Abdomen is soft.      Tenderness: There is no abdominal tenderness. There is no guarding.   Musculoskeletal:         General: No swelling, tenderness, deformity or signs of injury. Normal range of motion.      Cervical back: Normal range of motion and neck supple. No rigidity or tenderness.      Right lower leg: No edema.      Left lower leg: No edema.   Lymphadenopathy:      Cervical: No cervical adenopathy.   Skin:     General: Skin is warm and dry.      Capillary Refill: Capillary refill takes less than 2 seconds.      Findings: No bruising, erythema, lesion or rash.   Neurological:      General: No focal deficit present.       Mental Status: He is alert and oriented to person, place, and time. Mental status is at baseline.      Motor: No weakness.      Gait: Gait normal.      Deep Tendon Reflexes: Reflexes normal.   Psychiatric:         Mood and Affect: Mood normal.         Behavior: Behavior normal.         Thought Content: Thought content normal.           Lines/Drains:        Telemetry:  Telemetry Orders (From admission, onward)               24 Hour Telemetry Monitoring  Continuous x 24 Hours (Telem)        Question:  Reason for 24 Hour Telemetry  Answer:  Arrhythmias requiring acute medical intervention / PPM or ICD malfunction                     Telemetry Reviewed: Atrial flutter. HR averaging 100  Indication for Continued Telemetry Use: Arrthymias requiring medical therapy               Lab Results: I have reviewed the following results:   Results from last 7 days   Lab Units 11/24/24  0452   WBC Thousand/uL 7.35   HEMOGLOBIN g/dL 10.3*   HEMATOCRIT % 31.9*   PLATELETS Thousands/uL 270   SEGS PCT % 63   LYMPHO PCT % 16   MONO PCT % 18*   EOS PCT % 1     Results from last 7 days   Lab Units 11/24/24  0452   SODIUM mmol/L 131*   POTASSIUM mmol/L 4.2   CHLORIDE mmol/L 99   CO2 mmol/L 24   BUN mg/dL 18   CREATININE mg/dL 0.81   ANION GAP mmol/L 8   CALCIUM mg/dL 8.7   ALBUMIN g/dL 3.4*   TOTAL BILIRUBIN mg/dL 0.46   ALK PHOS U/L 131*   ALT U/L 61*   AST U/L 38   GLUCOSE RANDOM mg/dL 105     Results from last 7 days   Lab Units 11/18/24  0526   INR  1.24*     Results from last 7 days   Lab Units 11/24/24  0719 11/23/24  2023 11/23/24  1609 11/23/24  1127 11/23/24  0709 11/22/24  2032 11/22/24  1605 11/22/24  1102 11/22/24  0727 11/21/24  2100 11/21/24  1518 11/21/24  1058   POC GLUCOSE mg/dl 105 119 122 112 130 147* 117 112 101 156* 169* 129                 Recent Cultures (last 7 days):           Imaging Results Review: No pertinent imaging studies reviewed.  Other Study Results Review: No additional pertinent studies  reviewed.    Last 24 Hours Medication List:     Current Facility-Administered Medications:     acetaminophen (TYLENOL) tablet 650 mg, Q6H PRN    apixaban (ELIQUIS) tablet 5 mg, BID    Artificial Tears Op Soln 1 drop, Q6H PRN    aspirin chewable tablet 81 mg, Daily    digoxin (LANOXIN) tablet 125 mcg, Daily    ferrous sulfate tablet 325 mg, Daily With Breakfast    fluticasone-vilanterol 200-25 mcg/actuation 1 puff, Daily    folic acid (FOLVITE) tablet 1 mg, Daily    gabapentin (NEURONTIN) capsule 300 mg, Daily    insulin lispro (HumALOG/ADMELOG) 100 units/mL subcutaneous injection 1-5 Units, HS    insulin lispro (HumALOG/ADMELOG) 100 units/mL subcutaneous injection 1-6 Units, TID AC **AND** Fingerstick Glucose (POCT), 4x Daily AC and at bedtime    labetalol (NORMODYNE) injection 10 mg, Q4H PRN    levalbuterol (XOPENEX) inhalation solution 1.25 mg, Q6H    magnesium Oxide (MAG-OX) tablet 800 mg, BID    magnesium sulfate 4 g/100 mL IVPB (premix) 4 g, Once, Last Rate: 4 g (11/24/24 0620)    melatonin tablet 6 mg, HS    metoprolol tartrate (LOPRESSOR) tablet 200 mg, Q12H SEDA    nicotine (NICODERM CQ) 14 mg/24hr TD 24 hr patch 1 patch, Daily    pantoprazole (PROTONIX) EC tablet 40 mg, BID AC    ranolazine (RANEXA) 12 hr tablet 500 mg, Q12H    senna-docusate sodium (SENOKOT S) 8.6-50 mg per tablet 1 tablet, Daily PRN    sodium chloride 3 % inhalation solution 4 mL, Q6H    sodium chloride tablet 2 g, BID With Meals    tamsulosin (FLOMAX) capsule 0.4 mg, Daily    thiamine tablet 100 mg, Daily    trimethobenzamide (TIGAN) IM injection 200 mg, Q6H PRN    Administrative Statements   Today, Patient Was Seen By: Pablito Presley MD    **Please Note: This note may have been constructed using a voice recognition system.**

## 2024-11-24 NOTE — ASSESSMENT & PLAN NOTE
Wt Readings from Last 3 Encounters:   11/24/24 83.5 kg (184 lb)   09/19/24 86.2 kg (190 lb 1.6 oz)   09/16/24 83.3 kg (183 lb 10.3 oz)   8/16/2024 TTE: EF visibly estimated 65% with severe left atrial dilatation  BNP 1150  continue PRN diuretics  increase Lopressor to 200 mg BID  monitor I and O, daily weights and labs  CHF education

## 2024-11-24 NOTE — ASSESSMENT & PLAN NOTE
Chronic, appears hypovolemic on admission - no evidence of acute exacerbation. Home medications include lopressor 50 mg BID and ranexa 500mg BID    BNP 1150 POA    Plan:  Cardiology consulted  Daily weights, I&O's  increased home Lopressor to 200mg BID and continued home Ranexa 500mg BID at discharge  discharged with new amlodipine 5mg daily for HTN

## 2024-11-24 NOTE — ASSESSMENT & PLAN NOTE
Lab Results   Component Value Date    HGBA1C 5.2 11/14/2024       Recent Labs     11/23/24  1127 11/23/24  1609 11/23/24 2023 11/24/24  0719   POCGLU 112 122 119 105       Blood Sugar Average: Last 72 hrs:  (P) [id number]  managed per primary team

## 2024-11-24 NOTE — ASSESSMENT & PLAN NOTE
CT C/A/P: Mildly displaced left lateral seventh rib fracture.     -Pain control with PRN tylenol  -incentive spirometry during hospitalization  -conservative management at discharge

## 2024-11-24 NOTE — PROGRESS NOTES
Progress Note - Cardiology   Name: Gregg Partida 64 y.o. male I MRN: [id number]  Unit/Bed#: 4 [facility] 420-01 I Date of Admission: 11/14/2024   Date of Service: 11/24/2024 I Hospital Day: 10     Assessment & Plan  Atrial fibrillation with RVR ([facility])  patient with history of recurrent atrial fibrillation  8/16/2024 TTE: EF visibly estimated 65% with severe dilatation of left atrium  unclear last time patient took his medications secondary to intoxication  telemetry demonstrates rapid atrial fibrillation rates 130s to 160s  Heart rate this morning ranges from 50 to-81 bpm  discuss with patient possible cardioversion, he differs and declines procedure at this time.  Doubtful patient will successfully cardioversion due to the severe dilatation of his left atrium.  Continue digoxin 0.125 mg daily started on 11/2021 after initial partial loading  Continue Lopressor to 200 mg BID   Continue Eliquis 5 mg bid  Will follow rate control strategy  Chronic heart failure with preserved ejection fraction ([facility])  Wt Readings from Last 3 Encounters:   11/24/24 83.5 kg (184 lb)   09/19/24 86.2 kg (190 lb 1.6 oz)   09/16/24 83.3 kg (183 lb 10.3 oz)   8/16/2024 TTE: EF visibly estimated 65% with severe left atrial dilatation  BNP 1150  continue PRN diuretics  increase Lopressor to 200 mg BID  monitor I and O, daily weights and labs  CHF education    COPD (chronic obstructive pulmonary disease) ([facility])  continue current inhaler regimen per primary team  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 disease ([facility])  Lab Results   Component Value Date    HGBA1C 5.2 11/14/2024       Recent Labs     11/23/24  1127 11/23/24  1609 11/23/24 2023 11/24/24  0719   POCGLU 112 122 119 105       Blood Sugar Average: Last 72 hrs:  (P) [id number]  managed per primary team  Electrolyte abnormality    Ambulatory dysfunction    BPH (benign prostatic hyperplasia)    Closed fracture of one rib of left side    Chronic hyponatremia           "  Subjective / Objective:     Review of Systems  Complains of vomiting  Vitals: Blood pressure 155/87, pulse 78, temperature 97.9 °F (36.6 °C), temperature source Oral, resp. rate 18, height 6' 2\" (1.88 m), weight 83.5 kg (184 lb), SpO2 96%.  Vitals:    11/23/24 0554 11/24/24 0551   Weight: 85.2 kg (187 lb 12.8 oz) 83.5 kg (184 lb)     Body mass index is 23.62 kg/m².  BP Readings from Last 3 Encounters:   11/24/24 155/87   11/04/24 120/75   10/31/24 (!) 187/97     Orthostatic Blood Pressures      Flowsheet Row Most Recent Value   Blood Pressure 155/87 filed at 11/24/2024 0752   Patient Position - Orthostatic VS Lying filed at 11/24/2024 0752          I/O         11/22 0701  11/23 0700 11/23 0701  11/24 0700 11/24 0701  11/25 0700    P.O. 800 300     I.V. (mL/kg)  10 (0.1)     Total Intake(mL/kg) 800 (9.4) 310 (3.7)     Urine (mL/kg/hr) 1850 (0.9) 2200 (1.1) 900 (3.9)    Total Output 1850 2200 900    Net -1050 -1890 -900                 Invasive Devices       Peripheral Intravenous Line  Duration             Peripheral IV 11/17/24 Left Forearm 7 days    Long-Dwell Peripheral IV (Midline) 11/17/24 Left Basilic 6 days                      Intake/Output Summary (Last 24 hours) at 11/24/2024 0948  Last data filed at 11/24/2024 0900  Gross per 24 hour   Intake --   Output 2700 ml   Net -2700 ml         Physical Exam:     Neurologic:  Alert & oriented x 3,  no focal deficits noted   Constitutional:  Well developed, well nourished,  With no acute distress  Eyes:  PERRL, conjunctiva normal   HENT:  Atraumatic, external ears normal, nose normal, .  NECK: Normal range of motion, no tenderness, neck is supple , No JVP  Respiratory:  Bilateral air entry, mostly clear to auscultation  Cardiovascular: Regular S1-S2 with a I/VI systolic murmur.  No pericardial rub or gallop audible  GI:  Soft, nondistended, audible bowel sounds, nontender, no hepatosplenomegaly appreciated  Musculoskeletal:  No tenderness, no deformities.  "   Extremities:  No appreciable pitting edema. Distal pulses are present  Psychiatric:  Speech and behavior appropriate              Medications/ Allergies:     Current Facility-Administered Medications   Medication Dose Route Frequency Provider Last Rate    acetaminophen  650 mg Oral Q6H PRN Marychuy [place] Nadia, CRNP      apixaban  5 mg Oral BID Marychuy [place] Nadia, CRNP      Artificial Tears  1 drop Both Eyes Q6H PRN Jonel Calvillo MD      aspirin  81 mg Oral Daily Marychuy [place] Nadia, CRNP      digoxin  125 mcg Oral Daily Jonel Calvillo MD      ferrous sulfate  325 mg Oral Daily With Breakfast Marychuy [place] Nadia, CRNP      fluticasone-vilanterol  1 puff Inhalation Daily Marychuy Hakeem Nadia, CRNP      folic acid  1 mg Oral Daily Marychuy Hakeem Nadia, CRNP      gabapentin  300 mg Oral Daily Marychuy [place] Nadia, CRNP      insulin lispro  1-5 Units Subcutaneous HS Pablito Presley MD      insulin lispro  1-6 Units Subcutaneous TID AC Pablito Presley MD      labetalol  10 mg Intravenous Q4H PRN Marychuy [place] Nadia, CRNP      levalbuterol  1.25 mg Nebulization Q6H Marychuy Hakeem Nadia, CRNP      magnesium Oxide  800 mg Oral BID Jonel Calvillo MD      magnesium sulfate  4 g Intravenous Once Pablito Presley MD 4 g (11/24/24 0620)    melatonin  6 mg Oral HS Jonel Calvillo MD      metoprolol tartrate  200 mg Oral Q12H SEDA Abigail Tran, MARIANNENP      nicotine  1 patch Transdermal Daily Marychuy Hakeem Nadia, CRNP      pantoprazole  40 mg Oral BID AC Marychuy Hakeem Nadia, CRNP      ranolazine  500 mg Oral Q12H Marychuy [place] Nadia, CRNP      senna-docusate sodium  1 tablet Oral Daily PRN Marychuy Hakeem Nadia, CRNP      sodium chloride  4 mL Nebulization Q6H Marychuy [place] Nadia, CRNP      sodium chloride  2 g Oral BID With Meals Pablito Presley MD      tamsulosin  0.4 mg Oral Daily Marychuy Hakeem Nadia, CRNP      thiamine  100 mg Oral Daily Marychuy Ahkeem Nadia, CRNP      trimethobenzamide  200 mg Intramuscular Q6H PRN  Marychuy Zuniga, CRMARCO ANTONIO       acetaminophen, 650 mg, Q6H PRN  Artificial Tears, 1 drop, Q6H PRN  labetalol, 10 mg, Q4H PRN  senna-docusate sodium, 1 tablet, Daily PRN  trimethobenzamide, 200 mg, Q6H PRN      No Known Allergies        Labs:   Troponins:      CBC with diff:  Results from last 7 days   Lab Units 11/24/24 0452 11/23/24  0503 11/22/24  0424 11/21/24  0529 11/20/24  0622 11/19/24  0505 11/18/24  0526   WBC Thousand/uL 7.35 7.65 6.59 6.04 7.07 9.65 9.09   HEMOGLOBIN g/dL 10.3* 9.9* 9.7* 9.7* 9.3* 10.0* 10.5*   HEMATOCRIT % 31.9* 30.3* 29.7* 29.6* 28.0* 30.0* 32.2*   MCV fL 100* 99* 99* 98 99* 99* 100*   PLATELETS Thousands/uL 270 244 203 182 154 130* 117*   RBC Million/uL 3.20* 3.07* 2.99* 3.01* 2.82* 3.04* 3.22*   MCH pg 32.2 32.2 32.4 32.2 33.0 32.9 32.6   MCHC g/dL 32.3 32.7 32.7 32.8 33.2 33.3 32.6   RDW % 15.9* 15.9* 15.7* 15.8* 16.1* 16.2* 16.0*   MPV fL 10.7 11.1 10.5 10.5 10.8 10.4 10.9   NRBC AUTO /100 WBCs 0 0  --   --  0 0 0       CMP:  Results from last 7 days   Lab Units 11/24/24 0452 11/23/24  0503 11/22/24  0424 11/21/24  0529 11/20/24  0622 11/19/24  0505 11/18/24  1950 11/18/24  0526   SODIUM mmol/L 131* 129* 129* 128* 130* 137 137 136   POTASSIUM mmol/L 4.2 4.2 4.1 4.2 3.7 3.7 3.9 4.4   CHLORIDE mmol/L 99 95* 96 93* 95* 102 100 100   CO2 mmol/L 24 27 29 28 29 26 25 23   ANION GAP mmol/L 8 7 4 7 6 9 12 13   BUN mg/dL 18 19 16 12 12 18 22 25   CREATININE mg/dL 0.81 0.90 0.89 0.82 0.74 0.77 0.84 0.96   CALCIUM mg/dL 8.7 8.1* 7.9* 8.1* 7.8* 8.2* 7.8* 8.2*   AST U/L 38 61* 22 28 42* 52*  --  63*   ALT U/L 61* 67* 36 44 55* 65*  --  71*   ALK PHOS U/L 131* 138* 98 110* 104 137*  --  157*   TOTAL PROTEIN g/dL 6.7 6.2* 5.9* 5.9* 5.6* 6.0*  --  5.7*   ALBUMIN g/dL 3.4* 3.2* 3.0* 3.0* 2.8* 3.0*  --  3.1*   TOTAL BILIRUBIN mg/dL 0.46 0.43 0.40 0.51 0.70 0.86  --  0.87   EGFR ml/min/1.73sq m 95 91 91 94 98 97 93 84       Magnesium:  Results from last 7 days   Lab Units 11/24/24  9866  "11/23/24  0503 11/22/24  0424 11/21/24  0529 11/20/24  0622 11/19/24  0505 11/18/24  1950   MAGNESIUM mg/dL 1.6* 1.8* 1.6* 1.4* 1.5* 1.7* 2.0     Coags:  Results from last 7 days   Lab Units 11/18/24  0526   INR  1.24*     TSH:    No components found for: \"TSH3\"  Lipid Profile:  Results from last 7 days   Lab Units 11/22/24  0424   CHOLESTEROL mg/dL 153   TRIGLYCERIDES mg/dL 101   HDL mg/dL 46   LDL CALC mg/dL 87     Hgb A1c:    NT-proBNP: No results for input(s): \"NTBNP\" in the last 72 hours.     Imaging & Testing   I have personally reviewed pertinent reports.    US right upper quadrant with liver dopplers  Result Date: 11/18/2024  Narrative: RIGHT UPPER QUADRANT ULTRASOUND WITH LIVER DOPPLER INDICATION: transaminitis. COMPARISON: Abdominal ultrasound October 26, 2022, CT chest abdomen and pelvis November 14, 2024 TECHNIQUE: Real-time ultrasound of the right upper quadrant was performed with a curvilinear transducer with both volumetric sweeps and still imaging techniques. FINDINGS: Doppler evaluation very limited due to patient inability to remain still during the exam. PANCREAS: Portions of the pancreas are obscured by bowel gas. Visualized portions of the pancreas are unremarkable. AORTA AND IVC: Visualized portions are normal for patient age. LIVER: Size: Severely enlarged. The liver measures 19.3 cm in the midclavicular line. Contour: Surface contour is smooth. Parenchyma: There is moderate diffuse increased echogenicity with smooth echotexture and acoustic beam attenuation. Most consistent with moderate hepatic steatosis. No liver mass identified. LIVER DOPPLER: Limited as described. The main portal vein and primary branch segments are patent and hepatopetal with normal spectral waveform. Hepatic veins are patent. Spectral waveforms grossly within normal limits. Main hepatic artery appears normal size, patent with normal spectral waveform. BILIARY: Gallbladder demonstrates marked wall thickening and " striations measuring up to 12 mm. There is relative contraction of the gallbladder. No gallstones are seen. Peres sign is negative. Findings most likely reflect underlying liver disease or hypoproteinemia. Acute cholecystitis is not considered likely. No intrahepatic biliary dilatation. CBD measures 5.0 mm. No choledocholithiasis. KIDNEY: Right kidney measures 11.1 x 5.6 x 5.4 cm. Volume 175.8 mL No hydronephrosis. 8 mm simple cyst upper pole. ASCITES: None. Right pleural effusion is incidentally noted.    Impression: 1. Hepatomegaly and hepatic steatosis. Doppler evaluation limited but grossly within normal limits. 2. Circumferential gallbladder wall thickening most likely related to underlying liver disease and/or hypoproteinemia. 3. Small right hepatic cyst. 4. Right pleural effusion. Workstation performed: [id number]     XR chest portable  Result Date: 11/17/2024  Narrative: XR CHEST PORTABLE INDICATION: increased WOB. COMPARISON: None FINDINGS: Mild cardiomegaly. Persistent decreased mild pulmonary edema. No pneumothorax or pleural effusion. CABG. Cervicothoracic hardware fusion. Bones are unremarkable for age. Normal upper abdomen.     Impression: Persistent decreased mild pulmonary edema. Workstation performed: [id number]     XR chest portable ICU  Result Date: 11/16/2024  Narrative: XR CHEST PORTABLE ICU INDICATION: ETT and subclavian CVC. COMPARISON: CXR 11/15/2024, chest CT 11/14/2024. FINDINGS: Moderate pulmonary edema. No pneumothorax or pleural effusion. Mild cardiomegaly, CABG. Cervicothoracic fusion. Normal upper abdomen.     Impression: No ET tube and no subclavian line. Moderate pulmonary edema. Workstation performed: [id number]     XR chest portable  Result Date: 11/15/2024  Narrative: XR CHEST PORTABLE INDICATION: chest pain, shortness of breath. COMPARISON: November 4, 2024 FINDINGS: Sternotomy. Coronary artery bypass grafting. Clear lungs. No pneumothorax or pleural effusion. Normal  cardiomediastinal silhouette. Bones are unremarkable for age. Cervical fusion hardware noted. No acute osseous abnormality.     Impression: No acute cardiopulmonary disease. Workstation performed: [id number]     CT chest abdomen pelvis wo contrast  Result Date: 11/14/2024  Narrative: CT CHEST, ABDOMEN AND PELVIS WITHOUT IV CONTRAST INDICATION: Chest and abdominal pain blunt trauma. COMPARISON: 9/14/2024. TECHNIQUE: CT examination of the chest, abdomen and pelvis was performed without intravenous contrast. Multiplanar 2D reformatted images were created from the source data. This examination, like all CT scans performed in the [facility], was performed utilizing techniques to minimize radiation dose exposure, including the use of iterative reconstruction and automated exposure control. Radiation dose length product (DLP) for this visit: 889.83 mGy-cm Enteric Contrast: Not administered. FINDINGS: CHEST LUNGS: Minimal subsegmental atelectasis at the base of the lingula. No tracheal or endobronchial lesion. PLEURA: No hemothorax or effusion. HEART/GREAT VESSELS: Cardiomegaly. Postsurgical change from CABG. No thoracic aortic aneurysm. MEDIASTINUM AND DENZEL: No hematoma. CHEST WALL AND LOWER NECK: Unremarkable. ABDOMEN LIVER/BILIARY TREE: Hepatic steatosis. GALLBLADDER: No calcified gallstones. No pericholecystic inflammatory change. SPLEEN: Unremarkable. PANCREAS: Unremarkable. ADRENAL GLANDS: Unremarkable. KIDNEYS/URETERS: Extensive renal vascular calcifications and nonobstructing 2 to 3 mm calculi. STOMACH AND BOWEL: Diverticulosis without evidence of diverticulitis or colitis. APPENDIX: Normal appendix. ABDOMINOPELVIC CAVITY: No free intraperitoneal air or hemoperitoneum. VESSELS: Calcified plaque in the abdominal aorta. No aneurysm. PELVIS REPRODUCTIVE ORGANS: No prostate enlargement age. URINARY BLADDER: Unremarkable. ABDOMINAL WALL/INGUINAL REGIONS: Unremarkable. BONES: Left lateral seventh rib  fracture with mild displacement. Possible nondisplaced left lateral eighth rib fracture. Chronic right lower rib fractures..     Impression: 1. Mildly displaced left lateral seventh rib fracture. 2. No hemothorax or pneumothorax. 3. No acute trauma in the abdomen or pelvis. Workstation performed: [id number]     CT spine cervical without contrast  Result Date: 11/14/2024  Narrative: CT CERVICAL SPINE - WITHOUT CONTRAST INDICATION:   Neck pain and trauma. COMPARISON:  None. TECHNIQUE:  CT examination of the cervical spine was performed without intravenous contrast.  Contiguous axial images were obtained. Multiplanar 2D reformatted images were created from the source data. Radiation dose length product (DLP) for this visit:  510.01 mGy-cm .  This examination, like all CT scans performed in the [facility], was performed utilizing techniques to minimize radiation dose exposure, including the use of iterative  reconstruction and automated exposure control. IMAGE QUALITY:  Diagnostic. FINDINGS: ALIGNMENT: Stable mild C7 anterolisthesis. Postsurgical change from C3-C7 laminectomies, anterior, interbody and posterior fusion VERTEBRAE:  No acute cervical spine fracture. DEGENERATIVE CHANGES: Decompression of the central canal throughout the cervical spine. Disc and facet osteophytosis results in mild to moderate neural foraminal narrowing in the mid and lower cervical spine PREVERTEBRAL AND PARASPINAL SOFT TISSUES: No swelling or hematoma THORACIC INLET: Clear lung apices.     Impression: No acute cervical spine fracture or traumatic malalignment. Workstation performed: [id number]     CT head without contrast  Result Date: 11/14/2024  Narrative: CT BRAIN - WITHOUT CONTRAST INDICATION:   Headache and head trauma. COMPARISON: 10/30/2024. TECHNIQUE:  CT examination of the brain was performed.  Multiplanar 2D reformatted images were created from the source data. Radiation dose length product (DLP) for this visit:   959.99 mGy-cm .  This examination, like all CT scans performed in the [facility], was performed utilizing techniques to minimize radiation dose exposure, including the use of iterative  reconstruction and automated exposure control. IMAGE QUALITY:  Diagnostic. FINDINGS: PARENCHYMA: No intracranial hemorrhage or mass effect. Few periventricular hypoattenuating foci may represent early microangiopathic change. VENTRICLES AND EXTRA-AXIAL SPACES: No hydrocephalus or extra-axial collection. VISUALIZED ORBITS: Intact. PARANASAL SINUSES: Clear. CALVARIUM AND EXTRACRANIAL SOFT TISSUES: No acute calvarial fracture.     Impression: No acute intracranial abnormality. Workstation performed: [id number]     XR chest 1 view portable  Result Date: 11/4/2024  Narrative: XR CHEST PORTABLE INDICATION: chest pain. COMPARISON: 10/30/2024 FINDINGS: Clear lungs. No pneumothorax or pleural effusion. Stable cardiomediastinal silhouette. CABG. Cervicothoracic spinal fixation hardware noted. Normal upper abdomen.     Impression: No acute cardiopulmonary disease. Workstation performed: [id number]     CT spine cervical without contrast  Result Date: 10/30/2024  Narrative: CT CERVICAL SPINE - WITHOUT CONTRAST INDICATION:   Fall with head strike. COMPARISON: Prior CT September 14, 2024 TECHNIQUE:  CT examination of the cervical spine was performed without intravenous contrast.  Contiguous axial images were obtained. Multiplanar 2D reformatted images were created from the source data. Radiation dose length product (DLP) for this visit:  447.54 mGy-cm .  This examination, like all CT scans performed in the [facility] access hospital, was performed utilizing techniques to minimize radiation dose exposure, including the use of iterative  reconstruction and automated exposure control. IMAGE QUALITY:  Diagnostic. FINDINGS: ALIGNMENT: Postoperative changes of posterior fusion C2 through the upper thoracic spine and anterior fusion C4-C7.  The inferior portion of the posterior fusion construct is not included on the study. VERTEBRAE: Multilevel decompressive laminectomy defects. Lucency surrounds the bilateral to T1 screws suggesting hardware loosening. DEGENERATIVE CHANGES: Moderate multilevel facet hypertrophy. Multilevel decompressive laminectomy defects. PREVERTEBRAL AND PARASPINAL SOFT TISSUES: Chronic fluid collection in the postsurgical bed potentially seroma unchanged from prior extending from C2-3 through C7. This is unchanged from the prior study. Unremarkable THORACIC INLET:  Normal.     Impression: Stable postoperative alignment status post extensive anterior and posterior cervical spinal fusion and multilevel laminectomy. No traumatic malalignment identified. Postsurgical chronic fluid collection in the laminectomy bed likely chronic hematoma/seroma unchanged from prior. Lucency surrounds bilateral T1 screws suggesting hardware loosening. The bilateral vertical support rods appear partially dislodged from the T2 screws bilaterally. This is unchanged from the prior study. Workstation performed: [id number]     CT head wo contrast  Result Date: 10/30/2024  Narrative: CT BRAIN - WITHOUT CONTRAST INDICATION:   Fall with head strike. COMPARISON: September 14, 2024 TECHNIQUE:  CT examination of the brain was performed.  Multiplanar 2D reformatted images were created from the source data. Radiation dose length product (DLP) for this visit:  1024.33 mGy-cm .  This examination, like all CT scans performed in the [facility], was performed utilizing techniques to minimize radiation dose exposure, including the use of iterative reconstruction and automated exposure control. IMAGE QUALITY:  Diagnostic. FINDINGS: PARENCHYMA:No intracranial mass, mass effect or midline shift. No CT signs of acute infarction.  No acute parenchymal hemorrhage. VENTRICLES AND EXTRA-AXIAL SPACES:  Normal for the patient's age. VISUALIZED ORBITS: Normal  visualized orbits. PARANASAL SINUSES: Normal visualized paranasal sinuses. CALVARIUM AND EXTRACRANIAL SOFT TISSUES: Normal.     Impression: No acute intracranial abnormality. Workstation performed: [id number]     XR chest portable  Result Date: 10/30/2024  Narrative: XR CHEST PORTABLE INDICATION: Hypoxia. COMPARISON: August 15, 2024 FINDINGS: Pulmonary vascular congestion and probable mild perihilar edema noted. No pneumothorax or pleural effusion. Heart is slightly prominent. Post CABG changes noted. Aorta is atherosclerotic. Cervicothoracic spinal fixation hardware noted. Normal upper abdomen.     Impression: Findings most suggestive of mild CHF with some perihilar edema. Workstation performed: [id number]        EKG / Monitor: Personally reviewed.    Atrial fibrillation, ventricular rate 90/min    Cardiac testing:   Results for orders placed during the hospital encounter of 20    Echo complete with contrast if indicated    Narrative  [address] [address]  [phone]    Transthoracic Echocardiogram  2D, M-mode, Doppler, and Color Doppler    Study date:  05-Dec-2020    Patient: EUNICE JUAN  MR number: [id number]  Account number: [id number]  : 1960  Age: 60 years  Gender: Male  Status: Inpatient  Location: Bedside  Height: 70 in  Weight: 159.7 lb  BP: 124/ 76 mmHg    Indications: A-Fib    Diagnoses: I48.0 - Atrial fibrillation    Sonographer:  Kenya Diane RDCS  Primary Physician:  RERE NATARAJAN  Referring Physician:  Gokul Walsh MD  Ordering Physician:  Gokul Walsh MD  Interpreting Physician:  DO SHEYLA Don    LEFT VENTRICLE:  Systolic function was normal by visual assessment. Ejection fraction was estimated in the range of 55 % to 60 %.  There were no regional wall motion abnormalities.  There was moderate concentric hypertrophy.  Doppler parameters were consistent with abnormal left ventricular relaxation (grade 1  diastolic dysfunction).    LEFT ATRIUM:  The atrium was moderately dilated.    MITRAL VALVE:  There was mild regurgitation.    AORTIC VALVE:  There was no evidence for stenosis.  There was no regurgitation.    TRICUSPID VALVE:  There was mild regurgitation.  Pulmonary artery systolic pressure was mildly to moderately increased.  Estimated peak PA pressure was 55 mmHg.    COMPARISONS:  There has been no significant interval change. Comparison was made with the previous study of 23-Dec-2019.    HISTORY: PRIOR HISTORY: Alcohol Abuse, DM, HTN, HLD, CAD, COPD    PROCEDURE: The procedure was performed at the bedside. This was a routine study. The transthoracic approach was used. The study included complete 2D imaging, M-mode, complete spectral Doppler, and color Doppler. The heart rate was 91 bpm,  at the start of the study. Image quality was adequate.    LEFT VENTRICLE: Size was normal. Systolic function was normal by visual assessment. Ejection fraction was estimated in the range of 55 % to 60 %. There were no regional wall motion abnormalities. There was moderate concentric hypertrophy.  DOPPLER: Doppler parameters were consistent with abnormal left ventricular relaxation (grade 1 diastolic dysfunction).    RIGHT VENTRICLE: The size was normal. Systolic function was normal. DOPPLER: Systolic pressure was within the normal range.    LEFT ATRIUM: The atrium was moderately dilated.    RIGHT ATRIUM: The atrium was mildly dilated.    MITRAL VALVE: Valve structure was normal. There was normal leaflet separation. No echocardiographic evidence for prolapse. DOPPLER: The transmitral velocity was within the normal range. There was no evidence for stenosis. There was mild  regurgitation.    AORTIC VALVE: The valve was trileaflet. Leaflets exhibited normal thickness and normal cuspal separation. DOPPLER: Transaortic velocity was within the normal range. There was no evidence for stenosis. There was no regurgitation.    TRICUSPID  "VALVE: The valve structure was normal. There was normal leaflet separation. DOPPLER: The transtricuspid velocity was within the normal range. There was mild regurgitation. Pulmonary artery systolic pressure was mildly to  moderately increased. Estimated peak PA pressure was 55 mmHg.    PULMONIC VALVE: Leaflets exhibited normal thickness, no calcification, and normal cuspal separation. DOPPLER: The transpulmonic velocity was within the normal range. There was no regurgitation.    PERICARDIUM: There was no thickening. There was no pericardial effusion.    AORTA: The root exhibited normal size.    PULMONARY ARTERY: The size was normal. The morphology appeared normal.    SYSTEM MEASUREMENT TABLES    2D  EF (Teich): 59.98 %    PW  MV E/A Ratio: 3.5    [employer]tersoci[employer] Commission Accredited Echocardiography Laboratory    Prepared and electronically signed by    Nehemias Pendleton DO  Signed 07-Dec-2020 10:55:08            Dr. Carley Hernandez MD, [facility]      \"This note has been constructed using a voice recognition system.Therefore there may be syntax, spelling, and/or grammatical errors. Please call if you have any questions. \"  "

## 2024-11-24 NOTE — PLAN OF CARE
Problem: INFECTION - ADULT  Goal: Absence or prevention of progression during hospitalization  Description: INTERVENTIONS:  - Assess and monitor for signs and symptoms of infection  - Monitor lab/diagnostic results  - Monitor all insertion sites, i.e. indwelling lines, tubes, and drains  - Monitor endotracheal if appropriate and nasal secretions for changes in amount and color  - [place] appropriate cooling/warming therapies per order  - Administer medications as ordered  - Instruct and encourage patient and family to use good hand hygiene technique  - Identify and instruct in appropriate isolation precautions for identified infection/condition  Outcome: Progressing     Problem: METABOLIC, FLUID AND ELECTROLYTES - ADULT  Goal: Electrolytes maintained within normal limits  Description: INTERVENTIONS:  - Monitor labs and assess patient for signs and symptoms of electrolyte imbalances  - Administer electrolyte replacement as ordered  - Monitor response to electrolyte replacements, including repeat lab results as appropriate  - Instruct patient on fluid and nutrition as appropriate  Outcome: Progressing     Problem: CARDIOVASCULAR - ADULT  Goal: Maintains optimal cardiac output and hemodynamic stability  Description: INTERVENTIONS:  - Monitor I/O, vital signs and rhythm  - Monitor for S/S and trends of decreased cardiac output  - Administer and titrate ordered vasoactive medications to optimize hemodynamic stability  - Assess quality of pulses, skin color and temperature  - Assess for signs of decreased coronary artery perfusion  - Instruct patient to report change in severity of symptoms  Outcome: Progressing

## 2024-11-24 NOTE — DISCHARGE SUMMARY
Discharge Summary - Family Medicine   Name: Gregg Partida 64 y.o. male I MRN: [id number]  Unit/Bed#: 4 [place] 420-01 I Date of Admission: 11/14/2024   Date of Service: 11/25/2024 I Hospital Day: 11    Medical Problems       Resolved Problems  Date Reviewed: 11/19/2024          Resolved    Thrombocytopenia (HCC) 11/22/2024     Resolved by  Pablito Presley MD    High anion gap metabolic acidosis 11/16/2024     Resolved by  CHELSEA Christopher    Encephalopathy acute 11/22/2024     Resolved by  Pablito Presley MD    Transaminitis 11/22/2024     Resolved by  Pablito Presley MD    Acute kidney injury superimposed on chronic kidney disease  (HCC) 11/16/2024     Resolved by  CHELSEA Christopher    Alcohol withdrawal (HCC) 11/22/2024     Resolved by  Pablito Presley MD    Acute respiratory insufficiency 11/22/2024     Resolved by  Pablito Presley MD        Discharging Physician / Practitioner: Pablito Presley MD  PCP: Lilliana Bliss MD  Admission Date:   Admission Orders (From admission, onward)       Ordered        11/14/24 0433  INPATIENT ADMISSION  Once                          Discharge Date: 11/25/24    Consultations During Hospital Stay:  ICU. Cardio    Procedures Performed:   none    Significant Findings / Test Results:   11/25: , Mg 1.7, Hgb 10.4  11/24: , Mg 1.6, Hgb 10.3  11/23: Na 129, Mg 1.8, K 4.2, Hgb 9.9  11/22: Na 129, Mg 1.6, Hgb 9.7  11/21: Na 128, Mag 1.4, BC no growth @ 4 days, Alk phos 110, Alb 3.0, Hb 9.7  11/20:  ALT 55, AST 42, Hgb 9.3, Mg 1.5  11/19: ALT 65, AST 52, , Hgb 10,   11/18: ALT 71, AST 63, , Hgb 10.5  11/17:   11/15: Na 131, BNP 1150, Hgb 10.4, WBC 6.14, Cr 0.91  11/14 @ 12am: Na 130, Cl 89, CO2 20, AG 21, BUN 37, Cr 1.60, Ca 7.6, AST 82, , 0h troponin 38, WBC wnl, Hgb 12.6, PLT 96, PT 15.1, INR 1.13, EtOH 381  11/14 @ 2am: Na 130, AG 17, BUN 35, Cr 1.44, Mg 1.1, ionized Ca 0.85    -CT head 11/14: No acute intracranial abnormality.  -CT  cervical spine 11/14: No acute cervical spine fracture or malalignment.  -CT chest/abdomen 11/14: Mildly displaced left lateral seventh rib fracture, no acute trauma  -RUQ US: 1. Hepatomegaly and hepatic steatosis. 2. Circumferential gallbladder wall thickening most likely related to underlying liver disease and/or hypoproteinemia. 3. Small right hepatic cyst. 4. Right pleural effusion.    Incidental Findings:   none     Test Results Pending at Discharge (will require follow up):   none     Outpatient Tests Requested:  BMP in one week    Complications:  none    Reason for Admission: alcohol intoxication, afib with RVE    Things to follow up on:   Are you taking your digoxin 125mcg daily?  Are you taking your metoprolol 200mg BID?  Have you followed up with cardiology?  Are you taking your salt tablets 2g BID?  Are you taking Amlodipine 5 mg daily?   Have you repeated your BMP?    Hospital Course:   Gregg Partida is a 64 y.o. male patient who originally presented to the hospital on 11/14/2024 due to alcohol intoxication and afib with RVR    Gregg Partida is a 64-year-old male with PMH of A.fib w/ RVR, alcohol abuse, COPD, T2DM w/ CKD, CHF, BPH who presented to the hospital on 11/14/2024 after being found with acute alcohol intoxication and A.fib w/ RVR and was admitted to ICU for management of afib, electrolyte abnormalities and CIWA monitoring. Patient was transferred to [facility], but was returned to ICU due acute encephalopathy secondary to ETOH withdrawal and increased ativan requirement. Patient was placed on telemetry, attempted rate control with Cardizem drip, and cardiology was consulted. Patient declined offer for cardioversion by cardiology. Improved mentation was achieved after several doses of phenobarb and patient was deemed appropriate for transfer to [facility]. Despite max dose cardizem drip patient remained in persistent a-fib and was transitioned to digoxin loading and subsequently achieved rate  control with daily digoxin and increased dose of metoprolol. Patient has remained afebrile and hemodynamically stable for discharge. Pt was discharged with increased metoprolol of 200 mg BID and new digoxin 125mcg daily. Pt was also discharged with new amlodipine 5mg daily for HTN. Pt was also discharged with salt tablet 2g BID Pt was advised with alcohol cessation and follow up with PCP and cardiology.  Patient now tolerating diet and vitally stable and ready for discharge.    * Atrial fibrillation with RVR (HCC)  Assessment & Plan  Presented to ED with afib RVR, HR > 140 sustained. EKG afib RVR with 's. Patient noting he has not been compliant with home eliquis or lopressor 50 mg BID.    In ED: s/p lopressor 5 mg x 2, cardizem 15 mg x 1  11/15 OVN: Cardizem x2 Lopressor x1    Plan:  - Pt was on Cardizem gtt  - Pt refuses cardioversion.   -Cardizem 30 mg every 6 hours and Cardizem gtt. were attempted but patient was persistently in RVR  - s/p digoxin x 2, rate started to come down    - Cardiology consulted, appreciate recs  - Continued digoxin 125 mcg daily at discharge  - Continued home Eliquis 5 mg BID at discharge  - Continued lopressor 200mg BID at discharge  - rate controlled, but still in aflutter/afib  - Unable to contact patient's son to  the meds over the weekend, medication is being delivered to Pt's home by pharmacy    Chronic hyponatremia  Assessment & Plan  Chronic hyponatremia likely secondary to alcohol use  Sodium 129 11/17, was briefly on 1.8% NSS which was d/c'ed once sodium reached 135  Sodium 131 today improving    Discharged with salt tablet to 2 g twice daily    Closed fracture of one rib of left side  Assessment & Plan  CT C/A/P: Mildly displaced left lateral seventh rib fracture.     -Pain control with PRN tylenol  -incentive spirometry during hospitalization  -conservative management at discharge    BPH (benign prostatic hyperplasia)  Assessment & Plan  Chronic,  stable    continued home flomax 0.4 mg at discharge    Ambulatory dysfunction  Assessment & Plan  Chronic, noting hx of several falls in setting of chronic alcohol abuse - hematomas noted diffusely on skin. PT/OT ordered    CT head wo contrast: no acute intracranial abnormality  CT C-spine wo contrast: No acute cervical spine fracture or traumatic malalignment.  CT chest/abd/pelvis wo contrast: mildly displaced left lateral 7th rib fracture, no hemothorax/pneumothorax, no acute trauma in abdomen or pelvis     -PT/OT recommend home PT, patient refused Home PT.    Fatty liver, alcoholic  Assessment & Plan  Chronic, 2/2 alcohol abuse -AST 82, ALT 46    Noncompliance  Assessment & Plan  Hx of noncompliance with home medications     Chronic heart failure with preserved ejection fraction (HCC)  Assessment & Plan  Chronic, appears hypovolemic on admission - no evidence of acute exacerbation. Home medications include lopressor 50 mg BID and ranexa 500mg BID    BNP 1150 POA    Plan:  Cardiology consulted  Daily weights, I&O's  increased home Lopressor to 200mg BID and continued home Ranexa 500mg BID at discharge  discharged with new amlodipine 5mg daily for HTN    Electrolyte abnormality  Assessment & Plan  Na 130, Cl 89, bicarb 20, BUN 37, Ca 7.6 POA in setting of chronic alcohol abuse and starvation.     Replete as needed  Discharged on salt tablets 2 g twice daily and mag-ox 800mg BID    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 disease (HCC)  Assessment & Plan  Chronic, home medications include metformin 500 mg QD. Hba1c 5.2.     Plan:  -Pt was on ISS during hospitalization  -continued home metformin at discharge    COPD (chronic obstructive pulmonary disease) ([facility])  Assessment & Plan  Chronic, stable, presenting with cough that patient reports is at baseline - noting he is non compliant with home inhalers  Patient received 2 doses of p.o. prednisone 40 mg    Plan:  -continued home Breo at  "discharge  -continued home PRN albuterol at discharge    Acute respiratory insufficiency-resolved as of 11/22/2024  Assessment & Plan  Pt was on 4L NC  in ICU  11/17 CXR \"Persistent decreased mild pulmonary edema\"     Continue nebs  Hold steroids given no wheezing on exam   Pulmonary hygiene  Aspiration precautions  Failed speech eval 11/18 and required frequent NT suctioning, now NPO  11/19 passed speech eval and stared on diet, tolerating well.  Pt is currently on RA, resolved     Alcohol withdrawal (HCC)-resolved as of 11/22/2024  Assessment & Plan  Presenting with acute intoxication and noting 4 day binge of significant alcohol use. Hx of several admissions for similar complaints in the past. Unable to assess compliance with home naltrexone     Ethanol on admission: 381  Stopped IVF since Pt started to tolerate diet   Multiple doses of phenobarbital in ICU    Plan:  -Pt was on CIWA protocol   -Seizure precautions, fall precautions, aspiration precautions  -Tigan PRN was given for nausea during hospitalization due to Qt prolongation   -continued home thiamine, folate at discharge    Acute kidney injury superimposed on chronic kidney disease  (HCC)-resolved as of 11/16/2024  Assessment & Plan  Cr 1.60 on admission, baseline 1 -1.2. Suspect secondary to dehydration, starvation acidosis, significant alcohol abuse.    UA: neg    ENMA resolved, Cr. 0.77 after IVF    Transaminitis-resolved as of 11/22/2024  Assessment & Plan  Increasing transaminitis 11/16, most likely related to alcoholic hepatitis  11/18 RUQ US- Hepatomegaly and hepatic steatosis. Circumferential gallbladder wall thickening most likely related to underlying liver disease and/or hypoproteinemia. Small right hepatic cyst.  LFTs now downtrending again, no abdominal pain on exam    -trended LFTs  -resolved    Encephalopathy acute-resolved as of 11/22/2024  Assessment & Plan  11/15-16: More encephalopathic overnight, likely secondary to alcohol withdrawal. " "Patient confused, hallucinating. Non-focal. Given multiple doses of ativan per [facility] protocol. Transferred to ICU for repeat phenobarb dosing  Received 130mg of phenobarb, started on precedex  11/17: 65mg of phenobarb  11/18: 65mg phenobarb x2  Remains encephalopathic since admission, oriented to self, year, and place at times.  Goal to wean precedex to off- currently at 1mcg/kg/hr  Consider other etiologies of encephalopathy  Considered CT head, however currently non focal  Infectious workup unremarkable, however concern for possible aspiration this stay   Ammonia 36  Consideration for baseline confusion/agitation given previous hospital notes   Mental status waxing/waning but overall less agitated  Mentation at baseline and tolerating diet well at discharge.    High anion gap metabolic acidosis-resolved as of 11/16/2024  Assessment & Plan  Suspect related to starvation ketosis     Resolved after IVF    Thrombocytopenia (HCC)-resolved as of 11/22/2024  Assessment & Plan  Plt 96 on admission, similar to baseline. Suspect 2/2 chronic alcohol abuse.  Plt 203 today resolved     Plan:  -Continued home ASA at discharge  -Monitor for bleeding  -Continued home eliquis at discharge       Please see above list of diagnoses and related plan for additional information.     Condition at Discharge: stable    Discharge Day Visit / Exam:   Subjective: Pt was seen and examined at the bedside, NAD. Pt is still tired in the morning, but Pt did not have any other acute complaints. Pt had BM yesterday and urinating well.    Vitals: Blood Pressure: 160/86 (11/25/24 1005)  Pulse: 88 (11/25/24 1005)  Temperature: 98.2 °F (36.8 °C) (11/25/24 0800)  Temp Source: Oral (11/25/24 0800)  Respirations: 18 (11/25/24 1005)  Height: 6' 2\" (188 cm) (11/14/24 0900)  Weight - Scale: 81.2 kg (179 lb) (11/25/24 0552)  SpO2: 97 % (11/25/24 1005)    Physical Exam  Vitals reviewed.   Constitutional:       General: He is not in acute distress.     " Appearance: Normal appearance. He is not ill-appearing.   HENT:      Head: Normocephalic and atraumatic.      Right Ear: External ear normal.      Left Ear: External ear normal.      Nose: Nose normal. No congestion or rhinorrhea.      Mouth/Throat:      Mouth: Mucous membranes are moist.      Pharynx: Oropharynx is clear. No oropharyngeal exudate or posterior oropharyngeal erythema.   Eyes:      General:         Right eye: No discharge.         Left eye: No discharge.      Conjunctiva/sclera: Conjunctivae normal.   Cardiovascular:      Rate and Rhythm: Normal rate. Rhythm irregular.      Pulses: Normal pulses.      Heart sounds: Normal heart sounds. No murmur heard.     No friction rub. No gallop.   Pulmonary:      Effort: Pulmonary effort is normal. No respiratory distress.      Breath sounds: No stridor. Rhonchi (referred bronchi sound from upper airway mucus) present. No wheezing or rales.   Chest:      Chest wall: No tenderness.   Abdominal:      General: Abdomen is flat. Bowel sounds are normal. There is no distension.      Palpations: Abdomen is soft.      Tenderness: There is no abdominal tenderness. There is no guarding.   Musculoskeletal:         General: No swelling, tenderness, deformity or signs of injury. Normal range of motion.      Cervical back: Normal range of motion and neck supple. No rigidity or tenderness.      Right lower leg: No edema.      Left lower leg: No edema.   Lymphadenopathy:      Cervical: No cervical adenopathy.   Skin:     General: Skin is warm and dry.      Capillary Refill: Capillary refill takes less than 2 seconds.      Findings: No bruising, erythema, lesion or rash.   Neurological:      General: No focal deficit present.      Mental Status: He is alert and oriented to person, place, and time. Mental status is at baseline.      Motor: No weakness.      Gait: Gait normal.      Deep Tendon Reflexes: Reflexes normal.   Psychiatric:         Mood and Affect: Mood normal.          Behavior: Behavior normal.         Thought Content: Thought content normal.         Discussion with Family: Patient declined call to .     Discharge instructions/Information to patient and family:   See after visit summary for information provided to patient and family.      Provisions for Follow-Up Care:  See after visit summary for information related to follow-up care and any pertinent home health orders.      Mobility at time of Discharge:   Basic Mobility Inpatient Raw Score: 24  JH-HLM Goal: 8: Walk 250 feet or more  JH-HLM Achieved: 7: Walk 25 feet or more  HLM Goal NOT achieved. Continue to encourage mobility in post discharge setting. Pt declined home PT     Disposition:   Home    Planned Readmission: none    Discharge Medications:  See after visit summary for reconciled discharge medications provided to patient and/or family.      Administrative Statements   Discharge Statement:  I have spent a total time of >45 minutes in caring for this patient on the day of the visit/encounter. >30 minutes of time was spent on: Counseling / Coordination of care, Documenting in the medical record, Reviewing / ordering tests, medicine, procedures  , and Communicating with other healthcare professionals .    **Please Note: This note may have been constructed using a voice recognition system**

## 2024-11-24 NOTE — ASSESSMENT & PLAN NOTE
Presenting with acute intoxication and noting 4 day binge of significant alcohol use. Hx of several admissions for similar complaints in the past. Unable to assess compliance with home naltrexone     Ethanol on admission: 381  Stopped IVF since Pt started to tolerate diet   Multiple doses of phenobarbital in ICU    Plan:  -Pt was on CIWA protocol   -Seizure precautions, fall precautions, aspiration precautions  -Tigan PRN was given for nausea during hospitalization due to Qt prolongation   -continued home thiamine, folate at discharge

## 2024-11-24 NOTE — ASSESSMENT & PLAN NOTE
Chronic, home medications include metformin 500 mg QD. Hba1c 5.2.     Plan:  -Pt was on ISS during hospitalization  -continued home metformin at discharge

## 2024-11-24 NOTE — PLAN OF CARE
Problem: PAIN - ADULT  Goal: Verbalizes/displays adequate comfort level or baseline comfort level  Description: Interventions:  - Encourage patient to monitor pain and request assistance  - Assess pain using appropriate pain scale  - Administer analgesics based on type and severity of pain and evaluate response  - Implement non-pharmacological measures as appropriate and evaluate response  - Consider cultural and social influences on pain and pain management  - Notify physician/advanced practitioner if interventions unsuccessful or patient reports new pain  Outcome: Progressing     Problem: INFECTION - ADULT  Goal: Absence or prevention of progression during hospitalization  Description: INTERVENTIONS:  - Assess and monitor for signs and symptoms of infection  - Monitor lab/diagnostic results  - Monitor all insertion sites, i.e. indwelling lines, tubes, and drains  - Monitor endotracheal if appropriate and nasal secretions for changes in amount and color  - [place] appropriate cooling/warming therapies per order  - Administer medications as ordered  - Instruct and encourage patient and family to use good hand hygiene technique  - Identify and instruct in appropriate isolation precautions for identified infection/condition  Outcome: Progressing  Goal: Absence of fever/infection during neutropenic period  Description: INTERVENTIONS:  - Monitor WBC    Outcome: Progressing     Problem: INFECTION - ADULT  Goal: Absence of fever/infection during neutropenic period  Description: INTERVENTIONS:  - Monitor WBC    Outcome: Progressing     Problem: SAFETY ADULT  Goal: Patient will remain free of falls  Description: INTERVENTIONS:  - Educate patient/family on patient safety including physical limitations  - Instruct patient to call for assistance with activity   - Consult OT/PT to assist with strengthening/mobility   - Keep Call bell within reach  - Keep bed low and locked with side rails adjusted as appropriate  - Keep  care items and personal belongings within reach  - Initiate and maintain comfort rounds  - Make Fall Risk Sign visible to staff  - Offer Toileting every 2 Hours, in advance of need  - Initiate/Maintain bed alarm  - Obtain necessary fall risk management equipment  - Apply yellow socks and bracelet for high fall risk patients  - Consider moving patient to room near nurses station  Outcome: Progressing  Goal: Maintain or return to baseline ADL function  Description: INTERVENTIONS:  -  Assess patient's ability to carry out ADLs; assess patient's baseline for ADL function and identify physical deficits which impact ability to perform ADLs (bathing, care of mouth/teeth, toileting, grooming, dressing, etc.)  - Assess/evaluate cause of self-care deficits   - Assess range of motion  - Assess patient's mobility; develop plan if impaired  - Assess patient's need for assistive devices and provide as appropriate  - Encourage maximum independence but intervene and supervise when necessary  - Involve family in performance of ADLs  - Assess for home care needs following discharge   - Consider OT consult to assist with ADL evaluation and planning for discharge  - Provide patient education as appropriate  Outcome: Progressing  Goal: Maintains/Returns to pre admission functional level  Description: INTERVENTIONS:  - Perform AM-PAC 6 Click Basic Mobility/ Daily Activity assessment daily.  - Set and communicate daily mobility goal to care team and patient/family/caregiver.   - Collaborate with rehabilitation services on mobility goals if consulted  - Perform Range of Motion 2 times a day.  - Reposition patient every 2 hours.  - Dangle patient 2 times a day  - Stand patient 2 times a day  - Ambulate patient 2 times a day  - Out of bed to chair 2 times a day   - Out of bed for meals 2 times a day  - Out of bed for toileting  - Record patient progress and toleration of activity level   Outcome: Progressing     Problem: DISCHARGE  PLANNING  Goal: Discharge to home or other facility with appropriate resources  Description: INTERVENTIONS:  - Identify barriers to discharge w/patient and caregiver  - Arrange for needed discharge resources and transportation as appropriate  - Identify discharge learning needs (meds, wound care, etc.)  - Arrange for interpretive services to assist at discharge as needed  - Refer to Case Management Department for coordinating discharge planning if the patient needs post-hospital services based on physician/advanced practitioner order or complex needs related to functional status, cognitive ability, or social support system  Outcome: Progressing     Problem: NEUROSENSORY - ADULT  Goal: Achieves stable or improved neurological status  Description: INTERVENTIONS  - Monitor and report changes in neurological status  - Monitor vital signs such as temperature, blood pressure, glucose, and any other labs ordered   - Initiate measures to prevent increased intracranial pressure  - Monitor for seizure activity and implement precautions if appropriate      Outcome: Progressing  Goal: Remains free of injury related to seizures activity  Description: INTERVENTIONS  - Maintain airway, patient safety  and administer oxygen as ordered  - Monitor patient for seizure activity, document and report duration and description of seizure to physician/advanced practitioner  - If seizure occurs,  ensure patient safety during seizure  - Reorient patient post seizure  - Seizure pads on all 4 side rails  - Instruct patient/family to notify RN of any seizure activity including if an aura is experienced  - Instruct patient/family to call for assistance with activity based on nursing assessment  - Administer anti-seizure medications if ordered    Outcome: Progressing  Goal: Achieves maximal functionality and self care  Description: INTERVENTIONS  - Monitor swallowing and airway patency with patient fatigue and changes in neurological status  -  Encourage and assist patient to increase activity and self care.   - Encourage visually impaired, hearing impaired and aphasic patients to use assistive/communication devices  Outcome: Progressing     Problem: CARDIOVASCULAR - ADULT  Goal: Maintains optimal cardiac output and hemodynamic stability  Description: INTERVENTIONS:  - Monitor I/O, vital signs and rhythm  - Monitor for S/S and trends of decreased cardiac output  - Administer and titrate ordered vasoactive medications to optimize hemodynamic stability  - Assess quality of pulses, skin color and temperature  - Assess for signs of decreased coronary artery perfusion  - Instruct patient to report change in severity of symptoms  Outcome: Progressing     Problem: GENITOURINARY - ADULT  Goal: Maintains or returns to baseline urinary function  Description: INTERVENTIONS:  - Assess urinary function  - Encourage oral fluids to ensure adequate hydration if ordered  - Administer IV fluids as ordered to ensure adequate hydration  - Administer ordered medications as needed  - Offer frequent toileting  - Follow urinary retention protocol if ordered  Outcome: Progressing

## 2024-11-24 NOTE — PLAN OF CARE
Problem: PAIN - ADULT  Goal: Verbalizes/displays adequate comfort level or baseline comfort level  Description: Interventions:  - Encourage patient to monitor pain and request assistance  - Assess pain using appropriate pain scale  - Administer analgesics based on type and severity of pain and evaluate response  - Implement non-pharmacological measures as appropriate and evaluate response  - Consider cultural and social influences on pain and pain management  - Notify physician/advanced practitioner if interventions unsuccessful or patient reports new pain  Outcome: Progressing     Problem: INFECTION - ADULT  Goal: Absence or prevention of progression during hospitalization  Description: INTERVENTIONS:  - Assess and monitor for signs and symptoms of infection  - Monitor lab/diagnostic results  - Monitor all insertion sites, i.e. indwelling lines, tubes, and drains  - Monitor endotracheal if appropriate and nasal secretions for changes in amount and color  - [place] appropriate cooling/warming therapies per order  - Administer medications as ordered  - Instruct and encourage patient and family to use good hand hygiene technique  - Identify and instruct in appropriate isolation precautions for identified infection/condition  Outcome: Progressing  Goal: Absence of fever/infection during neutropenic period  Description: INTERVENTIONS:  - Monitor WBC    Outcome: Progressing     Problem: SAFETY ADULT  Goal: Patient will remain free of falls  Description: INTERVENTIONS:  - Educate patient/family on patient safety including physical limitations  - Instruct patient to call for assistance with activity   - Consult OT/PT to assist with strengthening/mobility   - Keep Call bell within reach  - Keep bed low and locked with side rails adjusted as appropriate  - Keep care items and personal belongings within reach  - Initiate and maintain comfort rounds  - Make Fall Risk Sign visible to staff  - Offer Toileting every 2 Hours,  in advance of need  - Initiate/Maintain bed alarm  - Obtain necessary fall risk management equipment  - Apply yellow socks and bracelet for high fall risk patients  - Consider moving patient to room near nurses station  Outcome: Progressing     Problem: DISCHARGE PLANNING  Goal: Discharge to home or other facility with appropriate resources  Description: INTERVENTIONS:  - Identify barriers to discharge w/patient and caregiver  - Arrange for needed discharge resources and transportation as appropriate  - Identify discharge learning needs (meds, wound care, etc.)  - Arrange for interpretive services to assist at discharge as needed  - Refer to Case Management Department for coordinating discharge planning if the patient needs post-hospital services based on physician/advanced practitioner order or complex needs related to functional status, cognitive ability, or social support system  Outcome: Progressing     Problem: Knowledge Deficit  Goal: Patient/family/caregiver demonstrates understanding of disease process, treatment plan, medications, and discharge instructions  Description: Complete learning assessment and assess knowledge base.  Interventions:  - Provide teaching at level of understanding  - Provide teaching via preferred learning methods  Outcome: Progressing     Problem: NEUROSENSORY - ADULT  Goal: Achieves stable or improved neurological status  Description: INTERVENTIONS  - Monitor and report changes in neurological status  - Monitor vital signs such as temperature, blood pressure, glucose, and any other labs ordered   - Initiate measures to prevent increased intracranial pressure  - Monitor for seizure activity and implement precautions if appropriate      Outcome: Progressing  Goal: Remains free of injury related to seizures activity  Description: INTERVENTIONS  - Maintain airway, patient safety  and administer oxygen as ordered  - Monitor patient for seizure activity, document and report duration and  description of seizure to physician/advanced practitioner  - If seizure occurs,  ensure patient safety during seizure  - Reorient patient post seizure  - Seizure pads on all 4 side rails  - Instruct patient/family to notify RN of any seizure activity including if an aura is experienced  - Instruct patient/family to call for assistance with activity based on nursing assessment  - Administer anti-seizure medications if ordered    Outcome: Progressing     Problem: CARDIOVASCULAR - ADULT  Goal: Maintains optimal cardiac output and hemodynamic stability  Description: INTERVENTIONS:  - Monitor I/O, vital signs and rhythm  - Monitor for S/S and trends of decreased cardiac output  - Administer and titrate ordered vasoactive medications to optimize hemodynamic stability  - Assess quality of pulses, skin color and temperature  - Assess for signs of decreased coronary artery perfusion  - Instruct patient to report change in severity of symptoms  Outcome: Progressing     Problem: RESPIRATORY - ADULT  Goal: Achieves optimal ventilation and oxygenation  Description: INTERVENTIONS:  - Assess for changes in respiratory status  - Assess for changes in mentation and behavior  - Position to facilitate oxygenation and minimize respiratory effort  - Oxygen administered by appropriate delivery if ordered  - Initiate smoking cessation education as indicated  - Encourage broncho-pulmonary hygiene including cough, deep breathe, Incentive Spirometry  - Assess the need for suctioning and aspirate as needed  - Assess and instruct to report SOB or any respiratory difficulty  - Respiratory Therapy support as indicated  Outcome: Progressing     Problem: GASTROINTESTINAL - ADULT  Goal: Minimal or absence of nausea and/or vomiting  Description: INTERVENTIONS:  - Administer IV fluids if ordered to ensure adequate hydration  - Maintain NPO status until nausea and vomiting are resolved  - Nasogastric tube if ordered  - Administer ordered antiemetic  medications as needed  - Provide nonpharmacologic comfort measures as appropriate  - Advance diet as tolerated, if ordered  - Consider nutrition services referral to assist patient with adequate nutrition and appropriate food choices  Outcome: Progressing  Goal: Maintains adequate nutritional intake  Description: INTERVENTIONS:  - Monitor percentage of each meal consumed  - Identify factors contributing to decreased intake, treat as appropriate  - Assist with meals as needed  - Monitor I&O, weight, and lab values if indicated  - Obtain nutrition services referral as needed  Outcome: Progressing     Problem: GENITOURINARY - ADULT  Goal: Maintains or returns to baseline urinary function  Description: INTERVENTIONS:  - Assess urinary function  - Encourage oral fluids to ensure adequate hydration if ordered  - Administer IV fluids as ordered to ensure adequate hydration  - Administer ordered medications as needed  - Offer frequent toileting  - Follow urinary retention protocol if ordered  Outcome: Progressing     Problem: METABOLIC, FLUID AND ELECTROLYTES - ADULT  Goal: Electrolytes maintained within normal limits  Description: INTERVENTIONS:  - Monitor labs and assess patient for signs and symptoms of electrolyte imbalances  - Administer electrolyte replacement as ordered  - Monitor response to electrolyte replacements, including repeat lab results as appropriate  - Instruct patient on fluid and nutrition as appropriate  Outcome: Progressing     Problem: HEMATOLOGIC - ADULT  Goal: Maintains hematologic stability  Description: INTERVENTIONS  - Assess for signs and symptoms of bleeding or hemorrhage  - Monitor labs  - Administer supportive blood products/factors as ordered and appropriate  Outcome: Progressing     Problem: MUSCULOSKELETAL - ADULT  Goal: Maintain or return mobility to safest level of function  Description: INTERVENTIONS:  - Assess patient's ability to carry out ADLs; assess patient's baseline for ADL  function and identify physical deficits which impact ability to perform ADLs (bathing, care of mouth/teeth, toileting, grooming, dressing, etc.)  - Assess/evaluate cause of self-care deficits   - Assess range of motion  - Assess patient's mobility  - Assess patient's need for assistive devices and provide as appropriate  - Encourage maximum independence but intervene and supervise when necessary  - Involve family in performance of ADLs  - Assess for home care needs following discharge   - Consider OT consult to assist with ADL evaluation and planning for discharge  - Provide patient education as appropriate  Outcome: Progressing     Problem: Nutrition/Hydration-ADULT  Goal: Nutrient/Hydration intake appropriate for improving, restoring or maintaining nutritional needs  Description: Monitor and assess patient's nutrition/hydration status for malnutrition. Collaborate with interdisciplinary team and initiate plan and interventions as ordered.  Monitor patient's weight and dietary intake as ordered or per policy. Utilize nutrition screening tool and intervene as necessary. Determine patient's food preferences and provide high-protein, high-caloric foods as appropriate.     INTERVENTIONS:  - Monitor oral intake, urinary output, labs, and treatment plans  - Assess nutrition and hydration status and recommend course of action  - Evaluate amount of meals eaten  - Assist patient with eating if necessary   - Allow adequate time for meals  - Recommend/ encourage appropriate diets, oral nutritional supplements, and vitamin/mineral supplements  - Order, calculate, and assess calorie counts as needed  - Recommend, monitor, and adjust tube feedings and TPN/PPN based on assessed needs  - Assess need for intravenous fluids  - Provide specific nutrition/hydration education as appropriate  - Include patient/family/caregiver in decisions related to nutrition  Outcome: Progressing

## 2024-11-24 NOTE — ASSESSMENT & PLAN NOTE
Presented to ED with afib RVR, HR > 140 sustained. EKG afib RVR with 's. Patient noting he has not been compliant with home eliquis or lopressor 50 mg BID.    In ED: s/p lopressor 5 mg x 2, cardizem 15 mg x 1  11/15 OVN: Cardizem x2 Lopressor x1    Plan:  - Pt was on Cardizem gtt  - Pt refuses cardioversion.   -Cardizem 30 mg every 6 hours and Cardizem gtt. were attempted but patient was persistently in RVR  - s/p digoxin x 2, rate started to come down    - Cardiology consulted, appreciate recs  - Continued digoxin 125 mcg daily at discharge  - Continued home Eliquis 5 mg BID at discharge  - Continued lopressor 200mg BID at discharge  - rate controlled, but still in aflutter/afib  - Unable to contact patient's son to  the meds over the weekend, medication is being delivered to Pt's home by pharmacy

## 2024-11-24 NOTE — ASSESSMENT & PLAN NOTE
Chronic, noting hx of several falls in setting of chronic alcohol abuse - hematomas noted diffusely on skin. PT/OT ordered    CT head wo contrast: no acute intracranial abnormality  CT C-spine wo contrast: No acute cervical spine fracture or traumatic malalignment.  CT chest/abd/pelvis wo contrast: mildly displaced left lateral 7th rib fracture, no hemothorax/pneumothorax, no acute trauma in abdomen or pelvis     -PT/OT recommend home PT, patient refused Home PT.

## 2024-11-24 NOTE — ASSESSMENT & PLAN NOTE
"Pt was on 4L NC  in ICU  11/17 CXR \"Persistent decreased mild pulmonary edema\"     Continue nebs  Hold steroids given no wheezing on exam   Pulmonary hygiene  Aspiration precautions  Failed speech eval 11/18 and required frequent NT suctioning, now NPO  11/19 passed speech eval and stared on diet, tolerating well.  Pt is currently on RA, resolved   "

## 2024-11-24 NOTE — ASSESSMENT & PLAN NOTE
Na 130, Cl 89, bicarb 20, BUN 37, Ca 7.6 POA in setting of chronic alcohol abuse and starvation.     Replete as needed  Discharged on salt tablets 2 g twice daily and mag-ox 800mg BID

## 2024-11-24 NOTE — ASSESSMENT & PLAN NOTE
Chronic, stable, presenting with cough that patient reports is at baseline - noting he is non compliant with home inhalers  Patient received 2 doses of p.o. prednisone 40 mg    Plan:  -continued home Breo at discharge  -continued home PRN albuterol at discharge

## 2024-11-24 NOTE — ASSESSMENT & PLAN NOTE
Chronic hyponatremia likely secondary to alcohol use  Sodium 129 11/17, was briefly on 1.8% NSS which was d/c'ed once sodium reached 135  Sodium 131 today improving    Discharged with salt tablet to 2 g twice daily

## 2024-11-24 NOTE — ASSESSMENT & PLAN NOTE
Increasing transaminitis 11/16, most likely related to alcoholic hepatitis  11/18 RUQ US- Hepatomegaly and hepatic steatosis. Circumferential gallbladder wall thickening most likely related to underlying liver disease and/or hypoproteinemia. Small right hepatic cyst.  LFTs now downtrending again, no abdominal pain on exam    -trended LFTs  -resolved

## 2024-11-24 NOTE — ASSESSMENT & PLAN NOTE
11/15-16: More encephalopathic overnight, likely secondary to alcohol withdrawal. Patient confused, hallucinating. Non-focal. Given multiple doses of ativan per [facility] protocol. Transferred to ICU for repeat phenobarb dosing  Received 130mg of phenobarb, started on precedex  11/17: 65mg of phenobarb  11/18: 65mg phenobarb x2  Remains encephalopathic since admission, oriented to self, year, and place at times.  Goal to wean precedex to off- currently at 1mcg/kg/hr  Consider other etiologies of encephalopathy  Considered CT head, however currently non focal  Infectious workup unremarkable, however concern for possible aspiration this stay   Ammonia 36  Consideration for baseline confusion/agitation given previous hospital notes   Mental status waxing/waning but overall less agitated  Mentation at baseline and tolerating diet well at discharge.

## 2024-11-25 ENCOUNTER — TRANSITIONAL CARE MANAGEMENT (OUTPATIENT)
Age: 64
End: 2024-11-25

## 2024-11-25 VITALS
HEIGHT: 74 IN | DIASTOLIC BLOOD PRESSURE: 83 MMHG | HEART RATE: 76 BPM | WEIGHT: 179 LBS | OXYGEN SATURATION: 97 % | SYSTOLIC BLOOD PRESSURE: 125 MMHG | BODY MASS INDEX: 22.97 KG/M2 | RESPIRATION RATE: 18 BRPM | TEMPERATURE: 98.2 F

## 2024-11-25 LAB
ALBUMIN SERPL BCG-MCNC: 3.3 G/DL (ref 3.5–5)
ALP SERPL-CCNC: 123 U/L (ref 34–104)
ALT SERPL W P-5'-P-CCNC: 74 U/L (ref 7–52)
ANION GAP SERPL CALCULATED.3IONS-SCNC: 6 MMOL/L (ref 4–13)
AST SERPL W P-5'-P-CCNC: 51 U/L (ref 13–39)
BASOPHILS # BLD AUTO: 0.17 THOUSANDS/ΜL (ref 0–0.1)
BASOPHILS NFR BLD AUTO: 2 % (ref 0–1)
BILIRUB SERPL-MCNC: 0.38 MG/DL (ref 0.2–1)
BUN SERPL-MCNC: 22 MG/DL (ref 5–25)
CALCIUM ALBUM COR SERPL-MCNC: 9 MG/DL (ref 8.3–10.1)
CALCIUM SERPL-MCNC: 8.4 MG/DL (ref 8.4–10.2)
CHLORIDE SERPL-SCNC: 97 MMOL/L (ref 96–108)
CO2 SERPL-SCNC: 27 MMOL/L (ref 21–32)
CREAT SERPL-MCNC: 0.97 MG/DL (ref 0.6–1.3)
EOSINOPHIL # BLD AUTO: 0.09 THOUSAND/ΜL (ref 0–0.61)
EOSINOPHIL NFR BLD AUTO: 1 % (ref 0–6)
ERYTHROCYTE [DISTWIDTH] IN BLOOD BY AUTOMATED COUNT: 15.9 % (ref 11.6–15.1)
GFR SERPL CREATININE-BSD FRML MDRD: 83 ML/MIN/1.73SQ M
GLUCOSE SERPL-MCNC: 104 MG/DL (ref 65–140)
GLUCOSE SERPL-MCNC: 116 MG/DL (ref 65–140)
GLUCOSE SERPL-MCNC: 124 MG/DL (ref 65–140)
HCT VFR BLD AUTO: 31.4 % (ref 36.5–49.3)
HGB BLD-MCNC: 10.4 G/DL (ref 12–17)
IMM GRANULOCYTES # BLD AUTO: 0.05 THOUSAND/UL (ref 0–0.2)
IMM GRANULOCYTES NFR BLD AUTO: 1 % (ref 0–2)
LYMPHOCYTES # BLD AUTO: 1.25 THOUSANDS/ΜL (ref 0.6–4.47)
LYMPHOCYTES NFR BLD AUTO: 16 % (ref 14–44)
MAGNESIUM SERPL-MCNC: 1.7 MG/DL (ref 1.9–2.7)
MCH RBC QN AUTO: 32.8 PG (ref 26.8–34.3)
MCHC RBC AUTO-ENTMCNC: 33.1 G/DL (ref 31.4–37.4)
MCV RBC AUTO: 99 FL (ref 82–98)
MONOCYTES # BLD AUTO: 1.56 THOUSAND/ΜL (ref 0.17–1.22)
MONOCYTES NFR BLD AUTO: 19 % (ref 4–12)
NEUTROPHILS # BLD AUTO: 4.93 THOUSANDS/ΜL (ref 1.85–7.62)
NEUTS SEG NFR BLD AUTO: 61 % (ref 43–75)
NRBC BLD AUTO-RTO: 0 /100 WBCS
PLATELET # BLD AUTO: 267 THOUSANDS/UL (ref 149–390)
PMV BLD AUTO: 10.3 FL (ref 8.9–12.7)
POTASSIUM SERPL-SCNC: 4.6 MMOL/L (ref 3.5–5.3)
PROT SERPL-MCNC: 6.5 G/DL (ref 6.4–8.4)
RBC # BLD AUTO: 3.17 MILLION/UL (ref 3.88–5.62)
SODIUM SERPL-SCNC: 130 MMOL/L (ref 135–147)
WBC # BLD AUTO: 8.05 THOUSAND/UL (ref 4.31–10.16)

## 2024-11-25 PROCEDURE — 99239 HOSP IP/OBS DSCHRG MGMT >30: CPT | Performed by: INTERNAL MEDICINE

## 2024-11-25 PROCEDURE — 80053 COMPREHEN METABOLIC PANEL: CPT

## 2024-11-25 PROCEDURE — 83735 ASSAY OF MAGNESIUM: CPT

## 2024-11-25 PROCEDURE — 99232 SBSQ HOSP IP/OBS MODERATE 35: CPT | Performed by: INTERNAL MEDICINE

## 2024-11-25 PROCEDURE — 85025 COMPLETE CBC W/AUTO DIFF WBC: CPT

## 2024-11-25 PROCEDURE — 82948 REAGENT STRIP/BLOOD GLUCOSE: CPT

## 2024-11-25 RX ORDER — AMLODIPINE BESYLATE 5 MG/1
5 TABLET ORAL DAILY
Status: DISCONTINUED | OUTPATIENT
Start: 2024-11-25 | End: 2024-11-25 | Stop reason: HOSPADM

## 2024-11-25 RX ORDER — MAGNESIUM SULFATE HEPTAHYDRATE 40 MG/ML
4 INJECTION, SOLUTION INTRAVENOUS ONCE
Status: COMPLETED | OUTPATIENT
Start: 2024-11-25 | End: 2024-11-25

## 2024-11-25 RX ORDER — AMLODIPINE BESYLATE 5 MG/1
5 TABLET ORAL DAILY
Qty: 30 TABLET | Refills: 0 | Status: ON HOLD | OUTPATIENT
Start: 2024-11-25

## 2024-11-25 RX ADMIN — FERROUS SULFATE TAB 325 MG (65 MG ELEMENTAL FE) 325 MG: 325 (65 FE) TAB at 08:02

## 2024-11-25 RX ADMIN — ASPIRIN 81 MG CHEWABLE TABLET 81 MG: 81 TABLET CHEWABLE at 08:02

## 2024-11-25 RX ADMIN — TAMSULOSIN HYDROCHLORIDE 0.4 MG: 0.4 CAPSULE ORAL at 08:02

## 2024-11-25 RX ADMIN — FLUTICASONE FUROATE AND VILANTEROL TRIFENATATE 1 PUFF: 200; 25 POWDER RESPIRATORY (INHALATION) at 08:03

## 2024-11-25 RX ADMIN — APIXABAN 5 MG: 5 TABLET, FILM COATED ORAL at 08:02

## 2024-11-25 RX ADMIN — METOPROLOL TARTRATE 200 MG: 100 TABLET, FILM COATED ORAL at 08:02

## 2024-11-25 RX ADMIN — PANTOPRAZOLE SODIUM 40 MG: 40 TABLET, DELAYED RELEASE ORAL at 06:04

## 2024-11-25 RX ADMIN — Medication 800 MG: at 08:02

## 2024-11-25 RX ADMIN — AMLODIPINE BESYLATE 5 MG: 5 TABLET ORAL at 10:35

## 2024-11-25 RX ADMIN — GABAPENTIN 300 MG: 300 CAPSULE ORAL at 08:02

## 2024-11-25 RX ADMIN — DIGOXIN 125 MCG: 125 TABLET ORAL at 08:02

## 2024-11-25 RX ADMIN — RANOLAZINE 500 MG: 500 TABLET, FILM COATED, EXTENDED RELEASE ORAL at 10:35

## 2024-11-25 RX ADMIN — MAGNESIUM SULFATE HEPTAHYDRATE 4 G: 40 INJECTION, SOLUTION INTRAVENOUS at 09:00

## 2024-11-25 RX ADMIN — THIAMINE HCL TAB 100 MG 100 MG: 100 TAB at 08:02

## 2024-11-25 RX ADMIN — FOLIC ACID 1 MG: 1 TABLET ORAL at 08:02

## 2024-11-25 NOTE — CASE MANAGEMENT
Case Management Progress Note    Patient name Gregg Partida  Location 4 [place] 420/4 [place] 420-* MRN [id number]  : 1960 Date 2024       LOS (days): 11  Geometric Mean LOS (GMLOS) (days): 3.4  Days to GMLOS:-8        OBJECTIVE:        Current admission status: Inpatient  Preferred Pharmacy:   [facility] [address] [address]  Phone: [phone] Fax: [phone]    [facility] [address] [place] [address] [address]  [address]  [facility] [address]  Phone: [phone] Fax: [phone]    Primary Care Provider: Lilliana Bliss MD    Primary Insurance: [employer] REP  Secondary Insurance:     PROGRESS NOTE:    Lyft scheduled for transport home.

## 2024-11-25 NOTE — QUICK NOTE
I have personally counseled the patient on medication indication, compliance, utilization and side effects. Patient may be discharged home with Fluticasone-[place] Vilanterol 200-25mcg inhaler, 1 puff daily (rinse mouth after use).    Pablito Presley MD

## 2024-11-25 NOTE — ASSESSMENT & PLAN NOTE
HEPATOLOGY VISIT NOTE - HCV clinic  CHIEF COMPLAINT: Hepatitis C   (here w/ wife)    HISTORY     This is a 56 y.o. White male with chronic hepatitis C, concern for advanced liver fibrosis, liver lesion on prior imaging, and BMI > 40 here for f/u w/ add'l labs and imaging    Interval history:  Twinrix #1 - will be given today in injection clinic    Liver lesion noted on prior imaging:  o 2.9cm liver lesion on contrast CT 1/2021 (CE)  o 4.5cm area of transient increased attenuation, recommended MRI but not done  MRI today - pending  AFP - pending    MR Elastography to clarify staging: confirms advanced fibrosis F3-F4    Feels well  Motivated to have HCV treated  Denies jaundice, dark urine, hematemesis, melena, slowed mentation, abdominal distention.   Noted he's on protonix daily. Continued belching, dyspeptic symptoms. Reports he can not skip a dose w/o GERD symptoms      HCV history:  Labs were (+) for HCV 1/2021 but pt reports just finding out recently  Prior icteric illnesses: None  - Treatment naive  - Genotype ?    Liver staging:  HCV FibroSURE 11/24/21 - A3, F4  MR Elastography today 1/7/22 - kPa 7.13, F3-4 (no steatosis)  Imaging supports advanced fibrosis w/ SM   U/S 11/2021 - SM 16cm. Unremarkable liver. No ascites   Labs - mild transaminase elevation, ALT>AST. Normal plt 180s    MELD-Na score: 6 at 11/24/2021  7:40 AM  MELD score: 6 at 11/24/2021  7:40 AM  Calculated from:  Serum Creatinine: 0.9 mg/dL (Using min of 1 mg/dL) at 11/24/2021  7:40 AM  Serum Sodium: 137 mmol/L at 11/24/2021  7:40 AM  Total Bilirubin: 0.6 mg/dL (Using min of 1 mg/dL) at 11/24/2021  7:40 AM  INR(ratio): 1.0 at 11/24/2021  7:40 AM  Age: 56 years         PMH, PSH, PROBLEM LIST, SOCIAL HX, FAMILY HX, MEDS, ALLERGIES   Reviewed in EPIC  FAMILY HISTORY: neg for liver disease  Works as supervisor in [employer]   SOCIAL HISTORY:   Alcohol - infrequent, social  Drugs - none      ROS:   Review of Systems   Constitutional:  Lab Results   Component Value Date    HGBA1C 5.2 11/14/2024       Recent Labs     11/24/24  1115 11/24/24  1559 11/24/24  2112 11/25/24  0727   POCGLU 160* 146* 163* 124       Blood Sugar Average: Last 72 hrs:  (P) [id number]  managed per primary team   Negative for fever and malaise/fatigue.   Respiratory: Negative for cough.    Cardiovascular: Negative for chest pain and leg swelling.   Gastrointestinal: Negative for abdominal pain.   Skin: Negative for rash.   Psychiatric/Behavioral: Negative for memory loss.       PHYSICAL EXAM: Friendly WM in no acute distress. Alert and oriented.   VITALS: reviewed. BMI 41  HEENT: Sclerae anicteric.   LUNGS: Normal respiratory effort.   ABDOMEN: Nondistended. Soft. Nontender.   SKIN: No jaundice or spider telangectasias. No rashes on exposed skin  NEURO/PSYCH: Normal gait. Memory intact. Thought and speech patterns appropriate. No obvious depression or anxiety.       PERTINENT DIAGNOSTIC RESULTS     Lab Results   Component Value Date    WBC 7.74 11/24/2021    HGB 16.5 11/24/2021     11/24/2021     Lab Results   Component Value Date    INR 1.0 11/24/2021     Lab Results   Component Value Date    AST 57 (H) 11/24/2021    ALT 63 (H) 11/24/2021    BILITOT 0.6 11/24/2021    ALBUMIN 4.2 11/24/2021    ALKPHOS 56 11/24/2021    CREATININE 0.9 11/24/2021    BUN 17 11/24/2021     11/24/2021    K 4.5 11/24/2021           CT abdomen / pelvis w/ contrast - 1/21/21  REASON FOR EXAM: Abdominal infection suspected   TECHNICAL FACTORS: Multiple contiguous axial CT images were obtained of the abdomen and pelvis after administration of intravenous contrast. 2D reformatted images were performed. Automated exposure control was utilized for radiation dose reduction.   COMPARISON: None     FINDINGS:   Soft tissues/Musculature: Unremarkable   Osseous Structures: Mild to moderate degenerative changes identified throughout the visualized spine most notably at L5-S1. Mild degenerative changes identified within the bilateral sacroiliac and hip joints.   Lung bases: Nonspecific bibasilar, right greater than left, patchy airspace opacities, potentially representing atelectasis. Underlying edema or pneumonitis cannot be entirely excluded.    Aorta/Vasculature: Unremarkable   Lymph nodes/Mesentery: Prominent juxta diaphragmatic/pericardial lymph node is identified on axial image 8. Mildly prominent lymph nodes are noted within the elise hepatis. There is nonspecific fat stranding identified predominantly along the inferior aspect of the liver, suggestive potential sequelae of hepatitis. Clinical correlation recommended. To a lesser degree, the fat stranding abuts the posterior aspect of an otherwise unremarkable ascending colon. Underlying colitis cannot be entirely excluded.   Liver: Nonspecific ill-defined focus of nodular enhancement measuring up to approximately 2.9 cm on axial image 17, potentially representing hemangioma. This finding should be further evaluated with triple phase CT of the liver. There is minimal generalized diminished enhancement of the hepatic parenchyma which could potentially represent underlying hepatocellular disease process. Correlation with hepatic function studies recommended.   Gallbladder/Biliary System: There is a suspected 0.5 cm gallstone identified within otherwise unremarkable gallbladder. There is no evidence of extrahepatic or intrahepatic biliary ductal dilatation.   Pancreas: Unremarkable   Spleen: Splenomegaly.   Adrenal glands: Unremarkable   Kidneys/Ureters: 0.5 cm nonobstructing stone is identified within the right inferior renal pole identified on axial image 46. There is a indeterminate but favored benign 1.6 cm cystic structure identified within the right mid renal pole. Indeterminate 1.5 cm cystic lesion identified within the left inferior renal pole identified on axial image 51.   Urinary bladder: Unremarkable   Reproductive organs: Unremarkable   Bowel/Stomach: Unremarkable   Appendix: Surgically absent.     IMPRESSION:    1.  Heterogeneously diminished enhancement of the hepatic parenchyma, suggestive of diffuse hepatocellular disease process, with associated perihepatic inflammatory changes and  prominent elise hepatic and juxta diaphragmatic/pericardial lymph nodes as above, potentially representing hepatitis with associated reactive adenopathy. Clinical correlation and attention regarding adenopathy on follow-up examination recommended. Inflammatory stranding does abut the posterior aspect of the otherwise unremarkable ascending colon, colitis is considered less likely.    2.  Nonspecific 2.9 cm nodular enhancement identified within the right hepatic lobe on axial image 17, potentially representing a benign flash filling hemangioma. Further evaluation with triple phase CT of the liver should be considered.   3. Splenomegaly.   4. Nonobstructing 0.5 cm right inferior renal pole stone. Indeterminate bilateral renal cysts, favored benign.   5. Nonspecific bibasilar, right greater than left, patchy opacities, potentially representing atelectasis. Underlying edema or pneumonitis cannot be excluded, particular within the right lung base.   6. Prior appendectomy.   7. Additional findings as above.     CT abdomen / pelvis w/ contrast - 2/26/21  CLINICAL HISTORY: Right upper quadrant pain  TECHNIQUE: Low dose axial images, sagittal and coronal reformations were obtained from the lung bases to the pubic symphysis before and following the IV administration of transient enhancement of a focal area in the right lobe of the liver would recommend an MRI with and without contrast.  No other significant abnormalities are seen.  ML of Omnipaque 350.  30 mL of oral Omnipaque 350 was also administered.  Automatic exposure control (AEC) was utilized for dose reduction.  Dose: 1016 mGycm  COMPARISON: None     FINDINGS:  Previous studies not available for comparison.  There is a transient area of increased attenuation noted on the arterial phase exam.  This measures approximately 4.5 x 2.2 cm and is in the right lobe of the liver.     A definite mass is not seen.  I am not convinced this represents an a meningioma.  Would  recommend an MRI with and without contrast.  Spleen appears normal.  There is a splenule present.  Pancreas appears normal.  The adrenals are not enlarged.  There is a small cyst in the lower pole of either kidney.  Aorta shows no evidence of an aneurysm.  The appendix is not identified however there is no evidence of acute appendicitis.  There is mild fatty infiltration of the liver.     Impression:  Spine appears intact.    ASSESSMENT     56 y.o. White male with:  1. CHRONIC HEPATITIS C, GENOTYPE ? - treatment naive  -- Elevated transaminases  -- Lacking Immunity to HAV & HBV    2. WELL COMPENSATED CIRRHOSIS  -- FibroSURE - F3 / MR Elastography F3-4    3. LIVER LESION ON PRIOR IMAGING  -- MRI and AFP pending today    4. BMI > 40    5. GERD, on protonix daily     PLAN     1. Await pending AFP & MRI ABDOMEN   2. Proceed w/ Twinrix. Will get first shot today in injection clinic and schedule next two closer to home  3. Discussed use of mavyret x 8 weeks. Will proceed w/ Rx if no concern for HCC on pending tests       ADDENDUM  MRI 1/7/22 - indeterminate 5cm liver lesion.   Reviewed at IR conference 1/18/22:  - proceed w/ HCV Rx  - repeat MRI in 3-6 months  Can consider CLIMB study enrollment (pt notified via [facility]. Will proceed w/ this if okay w/ pt)    Mavyret x 8 weeks sent to [facility]     ______________________________________________________________________________  EDUCATION:  CIRRHOSIS  Discussed evidence that indicates that pt has cirrhosis.   -- Discussed liver fibrosis/scarring and relation to liver function. Reviewed significance of MELD scoring system as it relates to indication for transplant.  -- Signs and symptoms of hepatic decompensation were reviewed, including jaundice, ascites, and slowed mentation due to hepatic encephalopathy, bleeding from esophageal varices. The patient should seek medical attention if any of these things occur.   -- We discussed the increased risk of  hepatocellular carcinoma due to cirrhosis. Lifelong screening every six months with ultrasound and AFP is recommended.     HCV RX  Discussed goal of HCV eradication to prevent progression of liver disease.  Discussed use of Mavyret (3 pills daily with food) x 8 weeks with potential side effects of fatigue, headache.    Discussed potential for drug interactions with Mavyret.  Current med list reviewed - no interactions noted.  Pt to contact me if new medicines are prescribed.  Herbal / alternative therapies must be avoided.    Discussed importance of medication adherence and risk of treatment failure / viral resistance if not adherent. Pt has verbalized understanding.    FATTY LIVER  Discussed significance of fatty liver disease and potential progression of liver disease.   Discussed lack of FDA approved therapies at this time.  Discussed role of diet, exercise, maintaining ideal body weight in mngmt of fatty liver.    ____________________________________________________________________________  Duration of encounter: 44 min  This includes face-to-face time and non face-to-face time preparing to see the patient (eg, review of tests), obtaining and/or reviewing separately obtained history, documenting clinical information in the electronic or other health record, independently interpreting resultsand communicating results to the patient/family/caregiver, or care coordination.

## 2024-11-25 NOTE — PROGRESS NOTES
Progress Note - Cardiology   Name: Gregg Partida 64 y.o. male I MRN: [id number]  Unit/Bed#: 4 [place] 420-01 I Date of Admission: 11/14/2024   Date of Service: 11/25/2024 I Hospital Day: 11    Assessment & Plan  Atrial fibrillation with RVR (Formerly [facility])  patient with history of recurrent atrial fibrillation  8/16/2024 TTE: EF visibly estimated 65% with severe dilatation of left atrium  unclear last time patient took his medications secondary to intoxication  telemetry demonstrates rapid atrial fibrillation rates 130s to 160s  Heart rate this morning ranges from 50 to-81 bpm  discuss with patient possible cardioversion, he differs and declines procedure at this time.  Doubtful patient will successfully cardioversion due to the severe dilatation of his left atrium.  Continue digoxin 0.125 mg daily started on 11/2021 after initial partial loading  Continue Lopressor to 200 mg BID   Continue Eliquis 5 mg bid  Will follow rate control strategy  Chronic heart failure with preserved ejection fraction (Formerly [facility])  Wt Readings from Last 3 Encounters:   11/25/24 81.2 kg (179 lb)   09/19/24 86.2 kg (190 lb 1.6 oz)   09/16/24 83.3 kg (183 lb 10.3 oz)   8/16/2024 TTE: EF visibly estimated 65% with severe left atrial dilatation  BNP 1150  continue PRN diuretics  increase Lopressor to 200 mg BID  monitor I and O, daily weights and labs  CHF education    COPD (chronic obstructive pulmonary disease) (Formerly [facility])  continue current inhaler regimen per primary team  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 disease (Formerly [facility])  Lab Results   Component Value Date    HGBA1C 5.2 11/14/2024       Recent Labs     11/24/24  1115 11/24/24  1559 11/24/24  2112 11/25/24  0727   POCGLU 160* 146* 163* 124       Blood Sugar Average: Last 72 hrs:  (P) [id number]  managed per primary team    Subjective   Chief Complaint: heart rates better controlled with digoxin, pressures remain elevated, will start Norvasc 10 mg daily      Objective :  Temp:  [98 °F  (36.7 °C)-98.4 °F (36.9 °C)] 98.2 °F (36.8 °C)  HR:  [64-82] 64  BP: (145-186)/() 186/100  Resp:  [16-18] 18  SpO2:  [95 %-96 %] 96 %  Orthostatic Blood Pressures      Flowsheet Row Most Recent Value   Blood Pressure 186/100 filed at 11/25/2024 0800   Patient Position - Orthostatic VS Lying filed at 11/24/2024 0752          First Weight: Weight - Scale: 84.3 kg (185 lb 13.6 oz) (11/14/24 0900)  Vitals:    11/24/24 0551 11/25/24 0552   Weight: 83.5 kg (184 lb) 81.2 kg (179 lb)     Physical Exam  Vitals and nursing note reviewed.   Constitutional:       Appearance: Normal appearance. He is ill-appearing (Chronically).   HENT:      Right Ear: External ear normal.      Left Ear: External ear normal.   Eyes:      General: No scleral icterus.        Right eye: No discharge.         Left eye: No discharge.   Cardiovascular:      Rate and Rhythm: Normal rate. Rhythm irregular.      Pulses: Normal pulses.   Pulmonary:      Effort: Pulmonary effort is normal. No accessory muscle usage or respiratory distress.      Breath sounds: Examination of the right-lower field reveals decreased breath sounds. Examination of the left-lower field reveals decreased breath sounds. Decreased breath sounds present.   Abdominal:      General: Bowel sounds are normal. There is no distension.      Palpations: Abdomen is soft.   Musculoskeletal:      Right lower leg: No edema.      Left lower leg: No edema.   Skin:     General: Skin is warm.      Capillary Refill: Capillary refill takes less than 2 seconds.   Neurological:      Mental Status: He is alert. Mental status is at baseline.   Psychiatric:         Mood and Affect: Mood normal.           Lab Results: I have reviewed the following results:  Results from last 7 days   Lab Units 11/25/24  0552 11/24/24  0452 11/23/24  0503   WBC Thousand/uL 8.05 7.35 7.65   HEMOGLOBIN g/dL 10.4* 10.3* 9.9*   HEMATOCRIT % 31.4* 31.9* 30.3*   PLATELETS Thousands/uL 267 270 244     Results from last 7  days   Lab Units 11/25/24  0552 11/24/24  0452 11/23/24  0503   POTASSIUM mmol/L 4.6 4.2 4.2   CHLORIDE mmol/L 97 99 95*   CO2 mmol/L 27 24 27   BUN mg/dL 22 18 19   CREATININE mg/dL 0.97 0.81 0.90   CALCIUM mg/dL 8.4 8.7 8.1*         Lab Results   Component Value Date    HGBA1C 5.2 11/14/2024     Lab Results   Component Value Date    CKTOTAL 258 09/14/2024    CKMB 2.0 04/22/2021    CKMBINDEX <1.0 04/22/2021    TROPONINI <0.02 06/25/2021             VTE Pharmacologic Prophylaxis: VTE covered by:  apixaban, Oral, 5 mg at 11/25/24 0802     VTE Mechanical Prophylaxis: sequential compression device    Abigail TRAN  Cardiology

## 2024-11-25 NOTE — ASSESSMENT & PLAN NOTE
Wt Readings from Last 3 Encounters:   11/25/24 81.2 kg (179 lb)   09/19/24 86.2 kg (190 lb 1.6 oz)   09/16/24 83.3 kg (183 lb 10.3 oz)   8/16/2024 TTE: EF visibly estimated 65% with severe left atrial dilatation  BNP 1150  continue PRN diuretics  increase Lopressor to 200 mg BID  monitor I and O, daily weights and labs  CHF education

## 2024-11-25 NOTE — PLAN OF CARE
Problem: PAIN - ADULT  Goal: Verbalizes/displays adequate comfort level or baseline comfort level  Description: Interventions:  - Encourage patient to monitor pain and request assistance  - Assess pain using appropriate pain scale  - Administer analgesics based on type and severity of pain and evaluate response  - Implement non-pharmacological measures as appropriate and evaluate response  - Consider cultural and social influences on pain and pain management  - Notify physician/advanced practitioner if interventions unsuccessful or patient reports new pain  Outcome: Progressing     Problem: INFECTION - ADULT  Goal: Absence or prevention of progression during hospitalization  Description: INTERVENTIONS:  - Assess and monitor for signs and symptoms of infection  - Monitor lab/diagnostic results  - Monitor all insertion sites, i.e. indwelling lines, tubes, and drains  - Monitor endotracheal if appropriate and nasal secretions for changes in amount and color  - [place] appropriate cooling/warming therapies per order  - Administer medications as ordered  - Instruct and encourage patient and family to use good hand hygiene technique  - Identify and instruct in appropriate isolation precautions for identified infection/condition  Outcome: Progressing  Goal: Absence of fever/infection during neutropenic period  Description: INTERVENTIONS:  - Monitor WBC    Outcome: Progressing     Problem: SAFETY ADULT  Goal: Patient will remain free of falls  Description: INTERVENTIONS:  - Educate patient/family on patient safety including physical limitations  - Instruct patient to call for assistance with activity   - Consult OT/PT to assist with strengthening/mobility   - Keep Call bell within reach  - Keep bed low and locked with side rails adjusted as appropriate  - Keep care items and personal belongings within reach  - Initiate and maintain comfort rounds  - Make Fall Risk Sign visible to staff  - Offer Toileting every 2 Hours,  in advance of need  - Initiate/Maintain bed alarm  - Obtain necessary fall risk management equipment  - Apply yellow socks and bracelet for high fall risk patients  - Consider moving patient to room near nurses station  Outcome: Progressing  Goal: Maintain or return to baseline ADL function  Description: INTERVENTIONS:  -  Assess patient's ability to carry out ADLs; assess patient's baseline for ADL function and identify physical deficits which impact ability to perform ADLs (bathing, care of mouth/teeth, toileting, grooming, dressing, etc.)  - Assess/evaluate cause of self-care deficits   - Assess range of motion  - Assess patient's mobility; develop plan if impaired  - Assess patient's need for assistive devices and provide as appropriate  - Encourage maximum independence but intervene and supervise when necessary  - Involve family in performance of ADLs  - Assess for home care needs following discharge   - Consider OT consult to assist with ADL evaluation and planning for discharge  - Provide patient education as appropriate  Outcome: Progressing  Goal: Maintains/Returns to pre admission functional level  Description: INTERVENTIONS:  - Perform AM-PAC 6 Click Basic Mobility/ Daily Activity assessment daily.  - Set and communicate daily mobility goal to care team and patient/family/caregiver.   - Collaborate with rehabilitation services on mobility goals if consulted  - Perform Range of Motion 2 times a day.  - Reposition patient every 2 hours.  - Dangle patient 2 times a day  - Stand patient 2 times a day  - Ambulate patient 2 times a day  - Out of bed to chair 2 times a day   - Out of bed for meals 2 times a day  - Out of bed for toileting  - Record patient progress and toleration of activity level   Outcome: Progressing     Problem: DISCHARGE PLANNING  Goal: Discharge to home or other facility with appropriate resources  Description: INTERVENTIONS:  - Identify barriers to discharge w/patient and caregiver  -  Arrange for needed discharge resources and transportation as appropriate  - Identify discharge learning needs (meds, wound care, etc.)  - Arrange for interpretive services to assist at discharge as needed  - Refer to Case Management Department for coordinating discharge planning if the patient needs post-hospital services based on physician/advanced practitioner order or complex needs related to functional status, cognitive ability, or social support system  Outcome: Progressing     Problem: Knowledge Deficit  Goal: Patient/family/caregiver demonstrates understanding of disease process, treatment plan, medications, and discharge instructions  Description: Complete learning assessment and assess knowledge base.  Interventions:  - Provide teaching at level of understanding  - Provide teaching via preferred learning methods  Outcome: Progressing     Problem: NEUROSENSORY - ADULT  Goal: Achieves stable or improved neurological status  Description: INTERVENTIONS  - Monitor and report changes in neurological status  - Monitor vital signs such as temperature, blood pressure, glucose, and any other labs ordered   - Initiate measures to prevent increased intracranial pressure  - Monitor for seizure activity and implement precautions if appropriate      Outcome: Progressing  Goal: Remains free of injury related to seizures activity  Description: INTERVENTIONS  - Maintain airway, patient safety  and administer oxygen as ordered  - Monitor patient for seizure activity, document and report duration and description of seizure to physician/advanced practitioner  - If seizure occurs,  ensure patient safety during seizure  - Reorient patient post seizure  - Seizure pads on all 4 side rails  - Instruct patient/family to notify RN of any seizure activity including if an aura is experienced  - Instruct patient/family to call for assistance with activity based on nursing assessment  - Administer anti-seizure medications if  ordered    Outcome: Progressing  Goal: Achieves maximal functionality and self care  Description: INTERVENTIONS  - Monitor swallowing and airway patency with patient fatigue and changes in neurological status  - Encourage and assist patient to increase activity and self care.   - Encourage visually impaired, hearing impaired and aphasic patients to use assistive/communication devices  Outcome: Progressing     Problem: CARDIOVASCULAR - ADULT  Goal: Maintains optimal cardiac output and hemodynamic stability  Description: INTERVENTIONS:  - Monitor I/O, vital signs and rhythm  - Monitor for S/S and trends of decreased cardiac output  - Administer and titrate ordered vasoactive medications to optimize hemodynamic stability  - Assess quality of pulses, skin color and temperature  - Assess for signs of decreased coronary artery perfusion  - Instruct patient to report change in severity of symptoms  Outcome: Progressing     Problem: RESPIRATORY - ADULT  Goal: Achieves optimal ventilation and oxygenation  Description: INTERVENTIONS:  - Assess for changes in respiratory status  - Assess for changes in mentation and behavior  - Position to facilitate oxygenation and minimize respiratory effort  - Oxygen administered by appropriate delivery if ordered  - Initiate smoking cessation education as indicated  - Encourage broncho-pulmonary hygiene including cough, deep breathe, Incentive Spirometry  - Assess the need for suctioning and aspirate as needed  - Assess and instruct to report SOB or any respiratory difficulty  - Respiratory Therapy support as indicated  Outcome: Progressing     Problem: GASTROINTESTINAL - ADULT  Goal: Minimal or absence of nausea and/or vomiting  Description: INTERVENTIONS:  - Administer IV fluids if ordered to ensure adequate hydration  - Maintain NPO status until nausea and vomiting are resolved  - Nasogastric tube if ordered  - Administer ordered antiemetic medications as needed  - Provide  nonpharmacologic comfort measures as appropriate  - Advance diet as tolerated, if ordered  - Consider nutrition services referral to assist patient with adequate nutrition and appropriate food choices  Outcome: Progressing  Goal: Maintains adequate nutritional intake  Description: INTERVENTIONS:  - Monitor percentage of each meal consumed  - Identify factors contributing to decreased intake, treat as appropriate  - Assist with meals as needed  - Monitor I&O, weight, and lab values if indicated  - Obtain nutrition services referral as needed  Outcome: Progressing     Problem: GENITOURINARY - ADULT  Goal: Maintains or returns to baseline urinary function  Description: INTERVENTIONS:  - Assess urinary function  - Encourage oral fluids to ensure adequate hydration if ordered  - Administer IV fluids as ordered to ensure adequate hydration  - Administer ordered medications as needed  - Offer frequent toileting  - Follow urinary retention protocol if ordered  Outcome: Progressing     Problem: METABOLIC, FLUID AND ELECTROLYTES - ADULT  Goal: Electrolytes maintained within normal limits  Description: INTERVENTIONS:  - Monitor labs and assess patient for signs and symptoms of electrolyte imbalances  - Administer electrolyte replacement as ordered  - Monitor response to electrolyte replacements, including repeat lab results as appropriate  - Instruct patient on fluid and nutrition as appropriate  Outcome: Progressing  Goal: Fluid balance maintained  Description: INTERVENTIONS:  - Monitor labs   - Monitor I/O and WT  - Instruct patient on fluid and nutrition as appropriate  - Assess for signs & symptoms of volume excess or deficit  Outcome: Progressing  Goal: Glucose maintained within target range  Description: INTERVENTIONS:  - Monitor Blood Glucose as ordered  - Assess for signs and symptoms of hyperglycemia and hypoglycemia  - Administer ordered medications to maintain glucose within target range  - Assess nutritional intake  and initiate nutrition service referral as needed  Outcome: Progressing     Problem: SKIN/TISSUE INTEGRITY - ADULT  Goal: Skin Integrity remains intact(Skin Breakdown Prevention)  Description: Assess:  -Perform Herrera assessment every shift  -Clean and moisturize skin every shift  -Inspect skin when repositioning, toileting, and assisting with ADLS  -Assess under medical devices   -Assess extremities for adequate circulation and sensation     Bed Management:  -Have minimal linens on bed & keep smooth, unwrinkled  -Change linens as needed when moist or perspiring  -Avoid sitting or lying in one position for more than 2 hours while in bed  -Keep HOB at 30 degrees     Toileting:  -Offer bedside commode  -Assess for incontinence every 2 hours  -Use incontinent care products after each incontinent episode    Activity:  -Mobilize patient 2 times a day  -Encourage activity and walks on unit  -Encourage or provide ROM exercises   -Turn and reposition patient every 2 Hours  -Use appropriate equipment to lift or move patient in bed  -Instruct/ Assist with weight shifting every 2 when out of bed in chair  -Consider limitation of chair time 4 hour intervals    Skin Care:  -Avoid use of baby powder, tape, friction and shearing, hot water or constrictive clothing  -Relieve pressure over bony prominences  -Do not massage red bony areas    Next Steps:  -Teach patient strategies to minimize risks   -Consider consults to  interdisciplinary teams   Outcome: Progressing  Goal: Incision(s), wounds(s) or drain site(s) healing without S/S of infection  Description: INTERVENTIONS  - Assess and document dressing, incision, wound bed, drain sites and surrounding tissue  - Provide patient and family education  - Perform skin care/dressing changes every shift  Outcome: Progressing     Problem: HEMATOLOGIC - ADULT  Goal: Maintains hematologic stability  Description: INTERVENTIONS  - Assess for signs and symptoms of bleeding or hemorrhage  -  Monitor labs  - Administer supportive blood products/factors as ordered and appropriate  Outcome: Progressing     Problem: MUSCULOSKELETAL - ADULT  Goal: Maintain or return mobility to safest level of function  Description: INTERVENTIONS:  - Assess patient's ability to carry out ADLs; assess patient's baseline for ADL function and identify physical deficits which impact ability to perform ADLs (bathing, care of mouth/teeth, toileting, grooming, dressing, etc.)  - Assess/evaluate cause of self-care deficits   - Assess range of motion  - Assess patient's mobility  - Assess patient's need for assistive devices and provide as appropriate  - Encourage maximum independence but intervene and supervise when necessary  - Involve family in performance of ADLs  - Assess for home care needs following discharge   - Consider OT consult to assist with ADL evaluation and planning for discharge  - Provide patient education as appropriate  Outcome: Progressing     Problem: Nutrition/Hydration-ADULT  Goal: Nutrient/Hydration intake appropriate for improving, restoring or maintaining nutritional needs  Description: Monitor and assess patient's nutrition/hydration status for malnutrition. Collaborate with interdisciplinary team and initiate plan and interventions as ordered.  Monitor patient's weight and dietary intake as ordered or per policy. Utilize nutrition screening tool and intervene as necessary. Determine patient's food preferences and provide high-protein, high-caloric foods as appropriate.     INTERVENTIONS:  - Monitor oral intake, urinary output, labs, and treatment plans  - Assess nutrition and hydration status and recommend course of action  - Evaluate amount of meals eaten  - Assist patient with eating if necessary   - Allow adequate time for meals  - Recommend/ encourage appropriate diets, oral nutritional supplements, and vitamin/mineral supplements  - Order, calculate, and assess calorie counts as needed  - Recommend,  monitor, and adjust tube feedings and TPN/PPN based on assessed needs  - Assess need for intravenous fluids  - Provide specific nutrition/hydration education as appropriate  - Include patient/family/caregiver in decisions related to nutrition  Outcome: Progressing     Problem: Prexisting or High Potential for Compromised Skin Integrity  Goal: Skin integrity is maintained or improved  Description: INTERVENTIONS:  - Identify patients at risk for skin breakdown  - Assess and monitor skin integrity  - Assess and monitor nutrition and hydration status  - Monitor labs   - Assess for incontinence   - Turn and reposition patient  - Assist with mobility/ambulation  - Relieve pressure over bony prominences  - Avoid friction and shearing  - Provide appropriate hygiene as needed including keeping skin clean and dry  - Evaluate need for skin moisturizer/barrier cream  - Collaborate with interdisciplinary team   - Patient/family teaching  - Consider wound care consult   Outcome: Progressing

## 2024-11-25 NOTE — UTILIZATION REVIEW
NOTIFICATION OF ADMISSION DISCHARGE   This is a Notification of Discharge from [facility]. Please be advised that this patient has been discharge from our facility. Below you will find the admission and discharge date and time including the patient’s disposition.   UTILIZATION REVIEW CONTACT:  Lashon James  Utilization   Network Utilization Review Department  Phone: [phone] x carefully listen to the prompts. All voicemails are confidential.  Email: [email or web address]     ADMISSION INFORMATION  PRESENTATION DATE: 11/14/2024 12:08 AM  OBERVATION ADMISSION DATE: N/A  INPATIENT ADMISSION DATE: 11/14/24  4:33 AM   DISCHARGE DATE: 11/25/2024  1:29 PM   DISPOSITION:Home/Self Care    Network Utilization Review Department  ATTENTION: Please call with any questions or concerns to [phone] and carefully listen to the prompts so that you are directed to the right person. All voicemails are confidential.   For Discharge needs, contact Care Management DC Support Team at [phone] opt. 2  Send all requests for admission clinical reviews, approved or denied determinations and any other requests to dedicated fax number below belonging to the campus where the patient is receiving treatment. List of dedicated fax numbers for the Facilities:  FACILITY NAME UR FAX NUMBER   ADMISSION DENIALS (Administrative/Medical Necessity) [phone]   DISCHARGE SUPPORT TEAM ([facility]) [phone]   PARENT CHILD HEALTH (Maternity/NICU/Pediatrics) [phone]   [facility] [phone]   [facility] [phone]   [facility] [phone]   [facility] [phone]   [facility] [phone]   [facility] [phone]   [facility] [phone]   [facility]  [phone]   [facility] [phone]   [facility] [phone]   [facility] [phone]   [facility] [phone]

## 2024-11-25 NOTE — PLAN OF CARE
Problem: PAIN - ADULT  Goal: Verbalizes/displays adequate comfort level or baseline comfort level  Description: Interventions:  - Encourage patient to monitor pain and request assistance  - Assess pain using appropriate pain scale  - Administer analgesics based on type and severity of pain and evaluate response  - Implement non-pharmacological measures as appropriate and evaluate response  - Consider cultural and social influences on pain and pain management  - Notify physician/advanced practitioner if interventions unsuccessful or patient reports new pain  Outcome: Progressing     Problem: INFECTION - ADULT  Goal: Absence or prevention of progression during hospitalization  Description: INTERVENTIONS:  - Assess and monitor for signs and symptoms of infection  - Monitor lab/diagnostic results  - Monitor all insertion sites, i.e. indwelling lines, tubes, and drains  - Monitor endotracheal if appropriate and nasal secretions for changes in amount and color  - [place] appropriate cooling/warming therapies per order  - Administer medications as ordered  - Instruct and encourage patient and family to use good hand hygiene technique  - Identify and instruct in appropriate isolation precautions for identified infection/condition  Outcome: Progressing  Goal: Absence of fever/infection during neutropenic period  Description: INTERVENTIONS:  - Monitor WBC    Outcome: Progressing     Problem: SAFETY ADULT  Goal: Patient will remain free of falls  Description: INTERVENTIONS:  - Educate patient/family on patient safety including physical limitations  - Instruct patient to call for assistance with activity   - Consult OT/PT to assist with strengthening/mobility   - Keep Call bell within reach  - Keep bed low and locked with side rails adjusted as appropriate  - Keep care items and personal belongings within reach  - Initiate and maintain comfort rounds  - Make Fall Risk Sign visible to staff  - Offer Toileting every 2 Hours,  in advance of need  - Initiate/Maintain bed alarm  - Obtain necessary fall risk management equipment  - Apply yellow socks and bracelet for high fall risk patients  - Consider moving patient to room near nurses station  Outcome: Progressing     Problem: DISCHARGE PLANNING  Goal: Discharge to home or other facility with appropriate resources  Description: INTERVENTIONS:  - Identify barriers to discharge w/patient and caregiver  - Arrange for needed discharge resources and transportation as appropriate  - Identify discharge learning needs (meds, wound care, etc.)  - Arrange for interpretive services to assist at discharge as needed  - Refer to Case Management Department for coordinating discharge planning if the patient needs post-hospital services based on physician/advanced practitioner order or complex needs related to functional status, cognitive ability, or social support system  Outcome: Progressing     Problem: Knowledge Deficit  Goal: Patient/family/caregiver demonstrates understanding of disease process, treatment plan, medications, and discharge instructions  Description: Complete learning assessment and assess knowledge base.  Interventions:  - Provide teaching at level of understanding  - Provide teaching via preferred learning methods  Outcome: Progressing     Problem: NEUROSENSORY - ADULT  Goal: Achieves stable or improved neurological status  Description: INTERVENTIONS  - Monitor and report changes in neurological status  - Monitor vital signs such as temperature, blood pressure, glucose, and any other labs ordered   - Initiate measures to prevent increased intracranial pressure  - Monitor for seizure activity and implement precautions if appropriate      Outcome: Progressing     Problem: CARDIOVASCULAR - ADULT  Goal: Maintains optimal cardiac output and hemodynamic stability  Description: INTERVENTIONS:  - Monitor I/O, vital signs and rhythm  - Monitor for S/S and trends of decreased cardiac output  -  Administer and titrate ordered vasoactive medications to optimize hemodynamic stability  - Assess quality of pulses, skin color and temperature  - Assess for signs of decreased coronary artery perfusion  - Instruct patient to report change in severity of symptoms  Outcome: Progressing     Problem: RESPIRATORY - ADULT  Goal: Achieves optimal ventilation and oxygenation  Description: INTERVENTIONS:  - Assess for changes in respiratory status  - Assess for changes in mentation and behavior  - Position to facilitate oxygenation and minimize respiratory effort  - Oxygen administered by appropriate delivery if ordered  - Initiate smoking cessation education as indicated  - Encourage broncho-pulmonary hygiene including cough, deep breathe, Incentive Spirometry  - Assess the need for suctioning and aspirate as needed  - Assess and instruct to report SOB or any respiratory difficulty  - Respiratory Therapy support as indicated  Outcome: Progressing     Problem: GASTROINTESTINAL - ADULT  Goal: Minimal or absence of nausea and/or vomiting  Description: INTERVENTIONS:  - Administer IV fluids if ordered to ensure adequate hydration  - Maintain NPO status until nausea and vomiting are resolved  - Nasogastric tube if ordered  - Administer ordered antiemetic medications as needed  - Provide nonpharmacologic comfort measures as appropriate  - Advance diet as tolerated, if ordered  - Consider nutrition services referral to assist patient with adequate nutrition and appropriate food choices  Outcome: Progressing     Problem: GENITOURINARY - ADULT  Goal: Maintains or returns to baseline urinary function  Description: INTERVENTIONS:  - Assess urinary function  - Encourage oral fluids to ensure adequate hydration if ordered  - Administer IV fluids as ordered to ensure adequate hydration  - Administer ordered medications as needed  - Offer frequent toileting  - Follow urinary retention protocol if ordered  Outcome: Progressing      Problem: METABOLIC, FLUID AND ELECTROLYTES - ADULT  Goal: Electrolytes maintained within normal limits  Description: INTERVENTIONS:  - Monitor labs and assess patient for signs and symptoms of electrolyte imbalances  - Administer electrolyte replacement as ordered  - Monitor response to electrolyte replacements, including repeat lab results as appropriate  - Instruct patient on fluid and nutrition as appropriate  Outcome: Progressing     Problem: HEMATOLOGIC - ADULT  Goal: Maintains hematologic stability  Description: INTERVENTIONS  - Assess for signs and symptoms of bleeding or hemorrhage  - Monitor labs  - Administer supportive blood products/factors as ordered and appropriate  Outcome: Progressing     Problem: MUSCULOSKELETAL - ADULT  Goal: Maintain or return mobility to safest level of function  Description: INTERVENTIONS:  - Assess patient's ability to carry out ADLs; assess patient's baseline for ADL function and identify physical deficits which impact ability to perform ADLs (bathing, care of mouth/teeth, toileting, grooming, dressing, etc.)  - Assess/evaluate cause of self-care deficits   - Assess range of motion  - Assess patient's mobility  - Assess patient's need for assistive devices and provide as appropriate  - Encourage maximum independence but intervene and supervise when necessary  - Involve family in performance of ADLs  - Assess for home care needs following discharge   - Consider OT consult to assist with ADL evaluation and planning for discharge  - Provide patient education as appropriate  Outcome: Progressing     Problem: Nutrition/Hydration-ADULT  Goal: Nutrient/Hydration intake appropriate for improving, restoring or maintaining nutritional needs  Description: Monitor and assess patient's nutrition/hydration status for malnutrition. Collaborate with interdisciplinary team and initiate plan and interventions as ordered.  Monitor patient's weight and dietary intake as ordered or per policy.  Utilize nutrition screening tool and intervene as necessary. Determine patient's food preferences and provide high-protein, high-caloric foods as appropriate.     INTERVENTIONS:  - Monitor oral intake, urinary output, labs, and treatment plans  - Assess nutrition and hydration status and recommend course of action  - Evaluate amount of meals eaten  - Assist patient with eating if necessary   - Allow adequate time for meals  - Recommend/ encourage appropriate diets, oral nutritional supplements, and vitamin/mineral supplements  - Order, calculate, and assess calorie counts as needed  - Recommend, monitor, and adjust tube feedings and TPN/PPN based on assessed needs  - Assess need for intravenous fluids  - Provide specific nutrition/hydration education as appropriate  - Include patient/family/caregiver in decisions related to nutrition  Outcome: Progressing

## 2024-11-28 ENCOUNTER — APPOINTMENT (EMERGENCY)
Dept: RADIOLOGY | Facility: HOSPITAL | Age: 64
DRG: 309 | End: 2024-11-28
Payer: COMMERCIAL

## 2024-11-28 ENCOUNTER — HOSPITAL ENCOUNTER (INPATIENT)
Facility: HOSPITAL | Age: 64
LOS: 1 days | Discharge: HOME | DRG: 309 | End: 2024-11-29
Attending: EMERGENCY MEDICINE | Admitting: INTERNAL MEDICINE
Payer: COMMERCIAL

## 2024-11-28 DIAGNOSIS — E87.1 CHRONIC HYPONATREMIA: ICD-10-CM

## 2024-11-28 DIAGNOSIS — I48.91 ATRIAL FIBRILLATION WITH RAPID VENTRICULAR RESPONSE (HCC): Primary | ICD-10-CM

## 2024-11-28 PROBLEM — F10.10 ALCOHOL ABUSE: Status: ACTIVE | Noted: 2019-11-23

## 2024-11-28 LAB
2HR DELTA HS TROPONIN: -9 NG/L
4HR DELTA HS TROPONIN: -7 NG/L
ALBUMIN SERPL BCG-MCNC: 3.2 G/DL (ref 3.5–5)
ALBUMIN SERPL BCG-MCNC: 3.3 G/DL (ref 3.5–5)
ALP SERPL-CCNC: 107 U/L (ref 34–104)
ALP SERPL-CCNC: 90 U/L (ref 34–104)
ALT SERPL W P-5'-P-CCNC: 45 U/L (ref 7–52)
ALT SERPL W P-5'-P-CCNC: 48 U/L (ref 7–52)
ANION GAP SERPL CALCULATED.3IONS-SCNC: 10 MMOL/L (ref 4–13)
ANION GAP SERPL CALCULATED.3IONS-SCNC: 8 MMOL/L (ref 4–13)
AST SERPL W P-5'-P-CCNC: 27 U/L (ref 13–39)
AST SERPL W P-5'-P-CCNC: 30 U/L (ref 13–39)
BASOPHILS # BLD AUTO: 0.11 THOUSANDS/ΜL (ref 0–0.1)
BASOPHILS # BLD AUTO: 0.12 THOUSANDS/ΜL (ref 0–0.1)
BASOPHILS NFR BLD AUTO: 2 % (ref 0–1)
BASOPHILS NFR BLD AUTO: 2 % (ref 0–1)
BILIRUB SERPL-MCNC: 0.32 MG/DL (ref 0.2–1)
BILIRUB SERPL-MCNC: 0.35 MG/DL (ref 0.2–1)
BUN SERPL-MCNC: 14 MG/DL (ref 5–25)
BUN SERPL-MCNC: 15 MG/DL (ref 5–25)
CA-I BLD-SCNC: 0.96 MMOL/L (ref 1.12–1.32)
CALCIUM ALBUM COR SERPL-MCNC: 8.5 MG/DL (ref 8.3–10.1)
CALCIUM ALBUM COR SERPL-MCNC: 8.9 MG/DL (ref 8.3–10.1)
CALCIUM SERPL-MCNC: 7.9 MG/DL (ref 8.4–10.2)
CALCIUM SERPL-MCNC: 8.3 MG/DL (ref 8.4–10.2)
CARDIAC TROPONIN I PNL SERPL HS: 41 NG/L (ref ?–50)
CARDIAC TROPONIN I PNL SERPL HS: 43 NG/L (ref ?–50)
CARDIAC TROPONIN I PNL SERPL HS: 50 NG/L (ref ?–50)
CHLORIDE SERPL-SCNC: 102 MMOL/L (ref 96–108)
CHLORIDE SERPL-SCNC: 102 MMOL/L (ref 96–108)
CO2 SERPL-SCNC: 20 MMOL/L (ref 21–32)
CO2 SERPL-SCNC: 22 MMOL/L (ref 21–32)
CREAT SERPL-MCNC: 1.12 MG/DL (ref 0.6–1.3)
CREAT SERPL-MCNC: 1.14 MG/DL (ref 0.6–1.3)
DIGOXIN SERPL-MCNC: 0.5 NG/ML (ref 0.8–2)
EOSINOPHIL # BLD AUTO: 0.04 THOUSAND/ΜL (ref 0–0.61)
EOSINOPHIL # BLD AUTO: 0.04 THOUSAND/ΜL (ref 0–0.61)
EOSINOPHIL NFR BLD AUTO: 1 % (ref 0–6)
EOSINOPHIL NFR BLD AUTO: 1 % (ref 0–6)
ERYTHROCYTE [DISTWIDTH] IN BLOOD BY AUTOMATED COUNT: 16 % (ref 11.6–15.1)
ERYTHROCYTE [DISTWIDTH] IN BLOOD BY AUTOMATED COUNT: 16.1 % (ref 11.6–15.1)
ETHANOL SERPL-MCNC: 123 MG/DL
GFR SERPL CREATININE-BSD FRML MDRD: 68 ML/MIN/1.73SQ M
GFR SERPL CREATININE-BSD FRML MDRD: 70 ML/MIN/1.73SQ M
GLUCOSE SERPL-MCNC: 101 MG/DL (ref 65–140)
GLUCOSE SERPL-MCNC: 108 MG/DL (ref 65–140)
GLUCOSE SERPL-MCNC: 110 MG/DL (ref 65–140)
GLUCOSE SERPL-MCNC: 144 MG/DL (ref 65–140)
GLUCOSE SERPL-MCNC: 156 MG/DL (ref 65–140)
GLUCOSE SERPL-MCNC: 94 MG/DL (ref 65–140)
HCT VFR BLD AUTO: 30.2 % (ref 36.5–49.3)
HCT VFR BLD AUTO: 30.9 % (ref 36.5–49.3)
HGB BLD-MCNC: 10.1 G/DL (ref 12–17)
HGB BLD-MCNC: 9.7 G/DL (ref 12–17)
IMM GRANULOCYTES # BLD AUTO: 0.03 THOUSAND/UL (ref 0–0.2)
IMM GRANULOCYTES # BLD AUTO: 0.04 THOUSAND/UL (ref 0–0.2)
IMM GRANULOCYTES NFR BLD AUTO: 0 % (ref 0–2)
IMM GRANULOCYTES NFR BLD AUTO: 1 % (ref 0–2)
LYMPHOCYTES # BLD AUTO: 0.83 THOUSANDS/ΜL (ref 0.6–4.47)
LYMPHOCYTES # BLD AUTO: 1.09 THOUSANDS/ΜL (ref 0.6–4.47)
LYMPHOCYTES NFR BLD AUTO: 10 % (ref 14–44)
LYMPHOCYTES NFR BLD AUTO: 15 % (ref 14–44)
MAGNESIUM SERPL-MCNC: 1.6 MG/DL (ref 1.9–2.7)
MCH RBC QN AUTO: 32.2 PG (ref 26.8–34.3)
MCH RBC QN AUTO: 32.8 PG (ref 26.8–34.3)
MCHC RBC AUTO-ENTMCNC: 32.1 G/DL (ref 31.4–37.4)
MCHC RBC AUTO-ENTMCNC: 32.7 G/DL (ref 31.4–37.4)
MCV RBC AUTO: 100 FL (ref 82–98)
MCV RBC AUTO: 100 FL (ref 82–98)
MONOCYTES # BLD AUTO: 0.78 THOUSAND/ΜL (ref 0.17–1.22)
MONOCYTES # BLD AUTO: 0.99 THOUSAND/ΜL (ref 0.17–1.22)
MONOCYTES NFR BLD AUTO: 11 % (ref 4–12)
MONOCYTES NFR BLD AUTO: 12 % (ref 4–12)
NEUTROPHILS # BLD AUTO: 5.21 THOUSANDS/ΜL (ref 1.85–7.62)
NEUTROPHILS # BLD AUTO: 6 THOUSANDS/ΜL (ref 1.85–7.62)
NEUTS SEG NFR BLD AUTO: 70 % (ref 43–75)
NEUTS SEG NFR BLD AUTO: 75 % (ref 43–75)
NRBC BLD AUTO-RTO: 0 /100 WBCS
NRBC BLD AUTO-RTO: 0 /100 WBCS
PLATELET # BLD AUTO: 279 THOUSANDS/UL (ref 149–390)
PLATELET # BLD AUTO: 281 THOUSANDS/UL (ref 149–390)
PMV BLD AUTO: 10.4 FL (ref 8.9–12.7)
PMV BLD AUTO: 10.5 FL (ref 8.9–12.7)
POTASSIUM SERPL-SCNC: 4.1 MMOL/L (ref 3.5–5.3)
POTASSIUM SERPL-SCNC: 4.1 MMOL/L (ref 3.5–5.3)
PROT SERPL-MCNC: 6.3 G/DL (ref 6.4–8.4)
PROT SERPL-MCNC: 6.5 G/DL (ref 6.4–8.4)
RBC # BLD AUTO: 3.01 MILLION/UL (ref 3.88–5.62)
RBC # BLD AUTO: 3.08 MILLION/UL (ref 3.88–5.62)
SODIUM SERPL-SCNC: 132 MMOL/L (ref 135–147)
SODIUM SERPL-SCNC: 132 MMOL/L (ref 135–147)
WBC # BLD AUTO: 7.27 THOUSAND/UL (ref 4.31–10.16)
WBC # BLD AUTO: 8.01 THOUSAND/UL (ref 4.31–10.16)

## 2024-11-28 PROCEDURE — 80053 COMPREHEN METABOLIC PANEL: CPT

## 2024-11-28 PROCEDURE — 99223 1ST HOSP IP/OBS HIGH 75: CPT | Performed by: INTERNAL MEDICINE

## 2024-11-28 PROCEDURE — 85025 COMPLETE CBC W/AUTO DIFF WBC: CPT | Performed by: EMERGENCY MEDICINE

## 2024-11-28 PROCEDURE — 85025 COMPLETE CBC W/AUTO DIFF WBC: CPT

## 2024-11-28 PROCEDURE — 87081 CULTURE SCREEN ONLY: CPT | Performed by: INTERNAL MEDICINE

## 2024-11-28 PROCEDURE — 82948 REAGENT STRIP/BLOOD GLUCOSE: CPT

## 2024-11-28 PROCEDURE — 96375 TX/PRO/DX INJ NEW DRUG ADDON: CPT

## 2024-11-28 PROCEDURE — 93005 ELECTROCARDIOGRAM TRACING: CPT

## 2024-11-28 PROCEDURE — 96374 THER/PROPH/DIAG INJ IV PUSH: CPT

## 2024-11-28 PROCEDURE — 99291 CRITICAL CARE FIRST HOUR: CPT | Performed by: EMERGENCY MEDICINE

## 2024-11-28 PROCEDURE — 80053 COMPREHEN METABOLIC PANEL: CPT | Performed by: EMERGENCY MEDICINE

## 2024-11-28 PROCEDURE — 99285 EMERGENCY DEPT VISIT HI MDM: CPT

## 2024-11-28 PROCEDURE — 82330 ASSAY OF CALCIUM: CPT

## 2024-11-28 PROCEDURE — 96365 THER/PROPH/DIAG IV INF INIT: CPT

## 2024-11-28 PROCEDURE — 84484 ASSAY OF TROPONIN QUANT: CPT | Performed by: EMERGENCY MEDICINE

## 2024-11-28 PROCEDURE — 36415 COLL VENOUS BLD VENIPUNCTURE: CPT | Performed by: EMERGENCY MEDICINE

## 2024-11-28 PROCEDURE — 96361 HYDRATE IV INFUSION ADD-ON: CPT

## 2024-11-28 PROCEDURE — 71045 X-RAY EXAM CHEST 1 VIEW: CPT

## 2024-11-28 PROCEDURE — 83735 ASSAY OF MAGNESIUM: CPT

## 2024-11-28 PROCEDURE — 80162 ASSAY OF DIGOXIN TOTAL: CPT | Performed by: EMERGENCY MEDICINE

## 2024-11-28 PROCEDURE — 84484 ASSAY OF TROPONIN QUANT: CPT

## 2024-11-28 PROCEDURE — 82077 ASSAY SPEC XCP UR&BREATH IA: CPT | Performed by: EMERGENCY MEDICINE

## 2024-11-28 RX ORDER — ASPIRIN 81 MG/1
81 TABLET, CHEWABLE ORAL DAILY
Status: DISCONTINUED | OUTPATIENT
Start: 2024-11-28 | End: 2024-11-29 | Stop reason: HOSPADM

## 2024-11-28 RX ORDER — CALCIUM GLUCONATE 20 MG/ML
2 INJECTION, SOLUTION INTRAVENOUS ONCE
Status: COMPLETED | OUTPATIENT
Start: 2024-11-28 | End: 2024-11-28

## 2024-11-28 RX ORDER — NICOTINE 21 MG/24HR
1 PATCH, TRANSDERMAL 24 HOURS TRANSDERMAL DAILY
Status: DISCONTINUED | OUTPATIENT
Start: 2024-11-28 | End: 2024-11-29 | Stop reason: HOSPADM

## 2024-11-28 RX ORDER — ONDANSETRON 2 MG/ML
4 INJECTION INTRAMUSCULAR; INTRAVENOUS ONCE
Status: COMPLETED | OUTPATIENT
Start: 2024-11-28 | End: 2024-11-28

## 2024-11-28 RX ORDER — RANOLAZINE 500 MG/1
500 TABLET, EXTENDED RELEASE ORAL EVERY 12 HOURS
Status: DISCONTINUED | OUTPATIENT
Start: 2024-11-28 | End: 2024-11-29 | Stop reason: HOSPADM

## 2024-11-28 RX ORDER — MAGNESIUM HYDROXIDE/ALUMINUM HYDROXICE/SIMETHICONE 120; 1200; 1200 MG/30ML; MG/30ML; MG/30ML
30 SUSPENSION ORAL EVERY 4 HOURS PRN
Status: DISCONTINUED | OUTPATIENT
Start: 2024-11-28 | End: 2024-11-29 | Stop reason: HOSPADM

## 2024-11-28 RX ORDER — GABAPENTIN 300 MG/1
300 CAPSULE ORAL 3 TIMES DAILY
Status: DISCONTINUED | OUTPATIENT
Start: 2024-11-28 | End: 2024-11-29 | Stop reason: HOSPADM

## 2024-11-28 RX ORDER — ACETAMINOPHEN 325 MG/1
650 TABLET ORAL EVERY 6 HOURS PRN
Status: DISCONTINUED | OUTPATIENT
Start: 2024-11-28 | End: 2024-11-29 | Stop reason: HOSPADM

## 2024-11-28 RX ORDER — LANOLIN ALCOHOL/MO/W.PET/CERES
100 CREAM (GRAM) TOPICAL DAILY
Status: DISCONTINUED | OUTPATIENT
Start: 2024-11-28 | End: 2024-11-29 | Stop reason: HOSPADM

## 2024-11-28 RX ORDER — TAMSULOSIN HYDROCHLORIDE 0.4 MG/1
0.4 CAPSULE ORAL DAILY
Status: DISCONTINUED | OUTPATIENT
Start: 2024-11-28 | End: 2024-11-29 | Stop reason: HOSPADM

## 2024-11-28 RX ORDER — CHLORDIAZEPOXIDE HYDROCHLORIDE 25 MG/1
50 CAPSULE, GELATIN COATED ORAL ONCE
Status: COMPLETED | OUTPATIENT
Start: 2024-11-28 | End: 2024-11-28

## 2024-11-28 RX ORDER — METOPROLOL TARTRATE 100 MG/1
200 TABLET ORAL EVERY 12 HOURS SCHEDULED
Status: DISCONTINUED | OUTPATIENT
Start: 2024-11-28 | End: 2024-11-29 | Stop reason: HOSPADM

## 2024-11-28 RX ORDER — FLUTICASONE FUROATE AND VILANTEROL 200; 25 UG/1; UG/1
1 POWDER RESPIRATORY (INHALATION) DAILY
Status: DISCONTINUED | OUTPATIENT
Start: 2024-11-28 | End: 2024-11-29 | Stop reason: HOSPADM

## 2024-11-28 RX ORDER — INSULIN LISPRO 100 [IU]/ML
1-5 INJECTION, SOLUTION INTRAVENOUS; SUBCUTANEOUS
Status: DISCONTINUED | OUTPATIENT
Start: 2024-11-28 | End: 2024-11-29 | Stop reason: HOSPADM

## 2024-11-28 RX ORDER — FOLIC ACID 1 MG/1
1000 TABLET ORAL DAILY
Status: DISCONTINUED | OUTPATIENT
Start: 2024-11-28 | End: 2024-11-29 | Stop reason: HOSPADM

## 2024-11-28 RX ORDER — CHLORDIAZEPOXIDE HYDROCHLORIDE 25 MG/1
50 CAPSULE, GELATIN COATED ORAL EVERY 8 HOURS SCHEDULED
Status: DISCONTINUED | OUTPATIENT
Start: 2024-11-28 | End: 2024-11-28

## 2024-11-28 RX ORDER — PANTOPRAZOLE SODIUM 40 MG/1
40 TABLET, DELAYED RELEASE ORAL
Status: DISCONTINUED | OUTPATIENT
Start: 2024-11-28 | End: 2024-11-29 | Stop reason: HOSPADM

## 2024-11-28 RX ORDER — MAGNESIUM SULFATE HEPTAHYDRATE 40 MG/ML
2 INJECTION, SOLUTION INTRAVENOUS ONCE
Status: COMPLETED | OUTPATIENT
Start: 2024-11-28 | End: 2024-11-28

## 2024-11-28 RX ORDER — SODIUM CHLORIDE, SODIUM LACTATE, POTASSIUM CHLORIDE, CALCIUM CHLORIDE 600; 310; 30; 20 MG/100ML; MG/100ML; MG/100ML; MG/100ML
100 INJECTION, SOLUTION INTRAVENOUS CONTINUOUS
Status: DISCONTINUED | OUTPATIENT
Start: 2024-11-28 | End: 2024-11-28

## 2024-11-28 RX ORDER — LANOLIN ALCOHOL/MO/W.PET/CERES
800 CREAM (GRAM) TOPICAL 2 TIMES DAILY
Status: DISCONTINUED | OUTPATIENT
Start: 2024-11-28 | End: 2024-11-29 | Stop reason: HOSPADM

## 2024-11-28 RX ORDER — DIGOXIN 125 MCG
125 TABLET ORAL DAILY
Status: DISCONTINUED | OUTPATIENT
Start: 2024-11-28 | End: 2024-11-29 | Stop reason: HOSPADM

## 2024-11-28 RX ORDER — AMLODIPINE BESYLATE 5 MG/1
5 TABLET ORAL DAILY
Status: DISCONTINUED | OUTPATIENT
Start: 2024-11-28 | End: 2024-11-28

## 2024-11-28 RX ORDER — SODIUM CHLORIDE 1 G/1
2 TABLET ORAL 2 TIMES DAILY WITH MEALS
Status: DISCONTINUED | OUTPATIENT
Start: 2024-11-28 | End: 2024-11-29 | Stop reason: HOSPADM

## 2024-11-28 RX ORDER — ALBUTEROL SULFATE 90 UG/1
2 INHALANT RESPIRATORY (INHALATION) EVERY 4 HOURS PRN
Status: DISCONTINUED | OUTPATIENT
Start: 2024-11-28 | End: 2024-11-29 | Stop reason: HOSPADM

## 2024-11-28 RX ORDER — FERROUS SULFATE 325(65) MG
325 TABLET ORAL
Status: DISCONTINUED | OUTPATIENT
Start: 2024-11-28 | End: 2024-11-29 | Stop reason: HOSPADM

## 2024-11-28 RX ORDER — DILTIAZEM HYDROCHLORIDE 5 MG/ML
INJECTION INTRAVENOUS
Status: DISPENSED
Start: 2024-11-28 | End: 2024-11-28

## 2024-11-28 RX ORDER — METOPROLOL TARTRATE 1 MG/ML
5 INJECTION, SOLUTION INTRAVENOUS ONCE
Status: COMPLETED | OUTPATIENT
Start: 2024-11-28 | End: 2024-11-28

## 2024-11-28 RX ADMIN — Medication 800 MG: at 09:02

## 2024-11-28 RX ADMIN — GABAPENTIN 300 MG: 300 CAPSULE ORAL at 15:48

## 2024-11-28 RX ADMIN — PANTOPRAZOLE SODIUM 40 MG: 40 TABLET, DELAYED RELEASE ORAL at 06:58

## 2024-11-28 RX ADMIN — THIAMINE HCL TAB 100 MG 100 MG: 100 TAB at 09:02

## 2024-11-28 RX ADMIN — SODIUM CHLORIDE 2 G: 1 TABLET ORAL at 06:58

## 2024-11-28 RX ADMIN — METOPROLOL TARTRATE 200 MG: 100 TABLET, FILM COATED ORAL at 21:45

## 2024-11-28 RX ADMIN — INSULIN LISPRO 1 UNITS: 100 INJECTION, SOLUTION INTRAVENOUS; SUBCUTANEOUS at 08:01

## 2024-11-28 RX ADMIN — SODIUM CHLORIDE, SODIUM LACTATE, POTASSIUM CHLORIDE, AND CALCIUM CHLORIDE 100 ML/HR: .6; .31; .03; .02 INJECTION, SOLUTION INTRAVENOUS at 07:54

## 2024-11-28 RX ADMIN — GABAPENTIN 300 MG: 300 CAPSULE ORAL at 09:02

## 2024-11-28 RX ADMIN — GABAPENTIN 300 MG: 300 CAPSULE ORAL at 21:45

## 2024-11-28 RX ADMIN — APIXABAN 5 MG: 5 TABLET, FILM COATED ORAL at 17:15

## 2024-11-28 RX ADMIN — RANOLAZINE 500 MG: 500 TABLET, FILM COATED, EXTENDED RELEASE ORAL at 17:15

## 2024-11-28 RX ADMIN — FERROUS SULFATE TAB 325 MG (65 MG ELEMENTAL FE) 325 MG: 325 (65 FE) TAB at 06:58

## 2024-11-28 RX ADMIN — APIXABAN 5 MG: 5 TABLET, FILM COATED ORAL at 09:02

## 2024-11-28 RX ADMIN — Medication 800 MG: at 17:15

## 2024-11-28 RX ADMIN — FLUTICASONE FUROATE AND VILANTEROL TRIFENATATE 1 PUFF: 200; 25 POWDER RESPIRATORY (INHALATION) at 09:06

## 2024-11-28 RX ADMIN — TAMSULOSIN HYDROCHLORIDE 0.4 MG: 0.4 CAPSULE ORAL at 09:03

## 2024-11-28 RX ADMIN — MAGNESIUM SULFATE HEPTAHYDRATE 2 G: 40 INJECTION, SOLUTION INTRAVENOUS at 09:00

## 2024-11-28 RX ADMIN — METOPROLOL TARTRATE 5 MG: 5 INJECTION INTRAVENOUS at 03:06

## 2024-11-28 RX ADMIN — FOLIC ACID 1000 MCG: 1 TABLET ORAL at 09:03

## 2024-11-28 RX ADMIN — SODIUM CHLORIDE 2 G: 1 TABLET ORAL at 15:48

## 2024-11-28 RX ADMIN — SODIUM CHLORIDE 1000 ML: 0.9 INJECTION, SOLUTION INTRAVENOUS at 02:53

## 2024-11-28 RX ADMIN — CALCIUM GLUCONATE 2 G: 20 INJECTION, SOLUTION INTRAVENOUS at 08:54

## 2024-11-28 RX ADMIN — ALBUTEROL SULFATE 2 PUFF: 90 AEROSOL, METERED RESPIRATORY (INHALATION) at 09:04

## 2024-11-28 RX ADMIN — ASPIRIN 81 MG CHEWABLE TABLET 81 MG: 81 TABLET CHEWABLE at 09:03

## 2024-11-28 RX ADMIN — DIGOXIN 125 MCG: 125 TABLET ORAL at 09:03

## 2024-11-28 RX ADMIN — ONDANSETRON 4 MG: 2 INJECTION INTRAMUSCULAR; INTRAVENOUS at 03:01

## 2024-11-28 RX ADMIN — METOPROLOL TARTRATE 200 MG: 100 TABLET, FILM COATED ORAL at 09:03

## 2024-11-28 RX ADMIN — CHLORDIAZEPOXIDE HYDROCHLORIDE 50 MG: 25 CAPSULE ORAL at 03:37

## 2024-11-28 RX ADMIN — DILTIAZEM HYDROCHLORIDE 5 MG/HR: 5 INJECTION INTRAVENOUS at 03:55

## 2024-11-28 NOTE — Clinical Note
Case was discussed with [facility] and the patient's admission status was agreed to be Admission Status: inpatient status to the service of Dr. Subramanian

## 2024-11-28 NOTE — ED PROVIDER NOTES
Time reflects when diagnosis was documented in both MDM as applicable and the Disposition within this note       Time User Action Codes Description Comment    11/28/2024  4:34 AM Nathen Masterson Add [I48.91] Atrial fibrillation with rapid ventricular response (HCC)           ED Disposition       ED Disposition   Admit    Condition   Stable    Date/Time   Thu Nov 28, 2024  4:34 AM    Comment   Case was discussed with [facility] and the patient's admission status was agreed to be Admission Status: inpatient status to the service of Dr. Nascimento .               Assessment & Plan       Medical Decision Making  Patient in atrial fibrillation with rapid ventricular response, recent presentation for the same.  Differential diagnosis for patient's chest pain includes but is not limited to angina related to atrial fibrillation with rapid ventricular response, atypical presentation of ACS, pneumonia, pneumothorax.  I ordered and reviewed lab work including CBC, CMP, troponin.  Patient with a troponin of 50.  EKG demonstrating diffuse T wave inversions and ST depressions similar to comparison 10/30/2024 most likely representing rate related changes.  Patient continuing to deny any ongoing chest pain.  Becoming mildly hypotensive after initial treatment of 5 mg IV Lopressor with improvement of blood pressure with fluid bolus.  Initiated on Cardizem drip with significant improvement of heart rate.  I discussed patient with hospitalist and admitted patient for further evaluation and management    Amount and/or Complexity of Data Reviewed  Labs: ordered.  Radiology: ordered.    Risk  Prescription drug management.  Decision regarding hospitalization.             Medications   diltiazem (CARDIZEM) 125 mg in sodium chloride 0.9 % 125 mL infusion (5 mg/hr Intravenous Continue to Inpatient Floor 11/28/24 1034)   diltiazem (CARDIZEM) 25 mg/5 mL injection **ADS Override Pull** (has no administration in time range)    ondansetron (ZOFRAN) injection 4 mg (4 mg Intravenous Given 11/28/24 0301)   sodium chloride 0.9 % bolus 1,000 mL (0 mL Intravenous Stopped 11/28/24 0423)   metoprolol (LOPRESSOR) injection 5 mg (5 mg Intravenous Given 11/28/24 0306)   chlordiazePOXIDE (LIBRIUM) capsule 50 mg (50 mg Oral Given 11/28/24 0337)       ED Risk Strat Scores                           SBIRT 20yo+      Flowsheet Row Most Recent Value   Initial Alcohol Screen: US AUDIT-C     1. How often do you have a drink containing alcohol? 6 Filed at: 11/28/2024 0241   2. How many drinks containing alcohol do you have on a typical day you are drinking?  1 Filed at: 11/28/2024 0241   3a. Male UNDER 65: How often do you have five or more drinks on one occasion? 0 Filed at: 11/28/2024 0241   Audit-C Score 7 Filed at: 11/28/2024 0241   BRIGIDA: How many times in the past year have you...    Used an illegal drug or used a prescription medication for non-medical reasons? Never Filed at: 11/28/2024 0241                            History of Present Illness       Chief Complaint   Patient presents with    Chest Pain     Patient reports cp starting 4 hours ago, did not take anything for, vomited 1 time, does admit to drinking 2 beers       Past Medical History:   Diagnosis Date    Acute on chronic kidney failure  (HCC)     Alcohol withdrawal (HCC) 06/07/2019    Atrial fibrillation (HCC)     Cancer (HCC)     prostate ca,had radiation    Cardiac disease     stents,then triple bypass    COPD (chronic obstructive pulmonary disease) (HCC)     Coronary artery disease     ETOH abuse     Heart failure (HCC)     History of heart surgery     says triple bypass [facility]    Hx of heart artery stent     2014    Hyperlipidemia     Hypertension     Hyponatremia 10/17/2019    Hypovolemic shock (HCC) 12/22/2019    Lumbar spondylitis (HCC) 10/13/2022    Metabolic acidosis, increased anion gap 04/21/2021    Nasal bone fracture 10/10/2022    Prostate CA (HCC)     S/P CABG x 3      2004    Sleep apnea       Past Surgical History:   Procedure Laterality Date    CARDIAC CATHETERIZATION      2 stents    CORONARY ARTERY BYPASS GRAFT  2004    [place] ARTHRD ANT INTERBODY MIN DSC CRV BELOW C2 N/A 12/16/2020    Procedure: Anterior cervical discectomy with fusion C4-C7; Posterior cervical decompression and fusion C2-T2;  Surgeon: David Rowell MD;  Location: [facility];  Service: Neurosurgery    TONSILLECTOMY        Family History   Problem Relation Age of Onset    Diabetes Mother     Uterine cancer Mother     COPD Father     Hypertension Father       Social History     Tobacco Use    Smoking status: Every Day     Current packs/day: 1.50     Average packs/day: 1.5 packs/day for 40.0 years (60.0 ttl pk-yrs)     Types: Cigarettes    Smokeless tobacco: Never   Vaping Use    Vaping status: Never Used   Substance Use Topics    Alcohol use: Yes     Alcohol/week: 4.0 standard drinks of alcohol     Types: 4 Standard drinks or equivalent per week     Comment: quart of vodka daily    Drug use: No      E-Cigarette/Vaping    E-Cigarette Use Never User       E-Cigarette/Vaping Substances    Nicotine Yes     THC No     CBD No     Flavoring No     Other No     Unknown No       I have reviewed and agree with the history as documented.     Patient is a 64-year-old male history of diabetes, alcohol abuse, COPD on no home oxygen requirement, atrial fibrillation presenting for evaluation of chest pain.  Patient states that symptoms started about 4 hours ago at rest and resolved about 30 minutes ago after he vomited while being transported by EMS.  Patient denies any ongoing nausea or chest pain, does state some shortness of breath and sensation of palpitations.  Patient states that symptoms feel similar to prior episode of atrial fibrillation.  Patient states that he is overall compliant with home medications but states he has not been able to  prescriptions from previous hospital admission.  Patient admits to  drinking several beers earlier in the day.        Review of Systems   Constitutional:  Negative for chills, fatigue and fever.   Respiratory:  Positive for shortness of breath. Negative for cough.    Cardiovascular:  Positive for chest pain (Resolved).   Gastrointestinal:  Positive for nausea (Resolved) and vomiting (Resolved). Negative for diarrhea.   Musculoskeletal:  Negative for arthralgias and myalgias.   Neurological:  Negative for weakness, numbness and headaches.   Psychiatric/Behavioral:  Negative for confusion.    All other systems reviewed and are negative.          Objective       ED Triage Vitals   Temperature Pulse Blood Pressure Respirations SpO2 Patient Position - Orthostatic VS   11/28/24 0258 11/28/24 0239 11/28/24 0239 11/28/24 0239 11/28/24 0240 11/28/24 0239   97.5 °F (36.4 °C) (!) 140 94/59 18 97 % Lying      Temp Source Heart Rate Source BP Location FiO2 (%) Pain Score    11/28/24 0258 11/28/24 0402 11/28/24 0239 -- --    Oral Monitor Right arm        Vitals      Date and Time Temp Pulse SpO2 Resp BP Pain Score FACES Pain Rating User   11/28/24 0436 -- 77 99 % 17 -- -- --    11/28/24 0430 -- 80 98 % 18 101/59 -- --    11/28/24 0421 -- 83 98 % -- 108/60 -- --    11/28/24 0414 -- 100 97 % 19 108/62 -- --    11/28/24 0408 -- 100 93 % 19 -- -- --    11/28/24 0407 -- 114 94 % 20 104/60 -- --    11/28/24 0405 -- 125 -- 19 -- -- --    11/28/24 0402 -- 132 90 % 20 116/58 -- --    11/28/24 0358 -- 128 91 % 20 102/55 -- --    11/28/24 0353 -- 128 92 % 20 113/56 -- --    11/28/24 0345 -- 137 95 % 20 116/65 -- --    11/28/24 0330 -- 129 92 % 20 115/71 -- --    11/28/24 0328 -- 129 93 % 20 114/65 -- --    11/28/24 0315 -- 137 91 % 20 95/64 -- --    11/28/24 0311 -- 135 90 % 20 84/52 MD notified. No further orders at this time -- --    11/28/24 0304 -- 130 93 % 20 98/59 -- --    11/28/24 0303 -- 134 92 % 20 96/62 -- --    11/28/24 0258 97.5 °F (36.4 °C) -- -- -- -- -- --     11/28/24 0240 -- -- 97 % -- -- -- -- KD   11/28/24 0239 -- 140 -- 18 94/59 -- -- KD            Physical Exam  Vitals and nursing note reviewed.   Constitutional:       General: He is in acute distress.      Appearance: Normal appearance. He is not ill-appearing, toxic-appearing or diaphoretic.      Comments: Mildly distressed   HENT:      Head: Normocephalic and atraumatic.      Comments: Moist mucous membranes     Right Ear: External ear normal.      Left Ear: External ear normal.   Eyes:      General:         Right eye: No discharge.         Left eye: No discharge.   Cardiovascular:      Rate and Rhythm: Tachycardia present. Rhythm irregularly irregular.   Pulmonary:      Effort: No respiratory distress.      Comments: No increased work of breathing.  Speaking in complete sentences.  Lungs clear to auscultation bilaterally without wheezes, rales, rhonchi.  Satting 98% on room air indicating adequate oxygenation however when sleeping oxygen dropping into the high 80s on room air.  Placed on 2 L nasal cannula.  Abdominal:      General: There is no distension.      Comments: Abdomen soft, nontender, nondistended without rigidity, rebound, guarding   Musculoskeletal:         General: No deformity.      Cervical back: Normal range of motion.   Skin:     Findings: No lesion or rash.   Neurological:      Mental Status: He is alert and oriented to person, place, and time. Mental status is at baseline.      Comments: Awake, alert, pleasant, interactive   Psychiatric:         Mood and Affect: Mood and affect normal.         Results Reviewed       Procedure Component Value Units Date/Time    CBC and differential [[id number]]  (Abnormal) Collected: 11/28/24 0342    Lab Status: Final result Specimen: Blood from Arm, Left Updated: 11/28/24 0417     WBC 8.01 Thousand/uL      RBC 3.08 Million/uL      Hemoglobin 10.1 g/dL      Hematocrit 30.9 %       fL      MCH 32.8 pg      MCHC 32.7 g/dL      RDW 16.0 %      MPV 10.4 fL       Platelets 279 Thousands/uL      nRBC 0 /100 WBCs      Segmented % 75 %      Immature Grans % 0 %      Lymphocytes % 10 %      Monocytes % 12 %      Eosinophils Relative 1 %      Basophils Relative 2 %      Absolute Neutrophils 6.00 Thousands/µL      Absolute Immature Grans 0.03 Thousand/uL      Absolute Lymphocytes 0.83 Thousands/µL      Absolute Monocytes 0.99 Thousand/µL      Eosinophils Absolute 0.04 Thousand/µL      Basophils Absolute 0.12 Thousands/µL     Comprehensive metabolic panel [[id number]]  (Abnormal) Collected: 11/28/24 0342    Lab Status: Final result Specimen: Blood from Arm, Left Updated: 11/28/24 0411     Sodium 132 mmol/L      Potassium 4.1 mmol/L      Chloride 102 mmol/L      CO2 22 mmol/L      ANION GAP 8 mmol/L      BUN 14 mg/dL      Creatinine 1.12 mg/dL      Glucose 101 mg/dL      Calcium 8.3 mg/dL      Corrected Calcium 8.9 mg/dL      AST 30 U/L      ALT 48 U/L      Alkaline Phosphatase 107 U/L      Total Protein 6.5 g/dL      Albumin 3.3 g/dL      Total Bilirubin 0.35 mg/dL      eGFR 70 ml/min/1.73sq m     Narrative:      National [employer] guidelines for Chronic Kidney Disease (CKD):     Stage 1 with normal or high GFR (GFR > 90 mL/min/1.73 square meters)    Stage 2 Mild CKD (GFR = 60-89 mL/min/1.73 square meters)    Stage 3A Moderate CKD (GFR = 45-59 mL/min/1.73 square meters)    Stage 3B Moderate CKD (GFR = 30-44 mL/min/1.73 square meters)    Stage 4 Severe CKD (GFR = 15-29 mL/min/1.73 square meters)    Stage 5 End Stage CKD (GFR <15 mL/min/1.73 square meters)  Note: GFR calculation is accurate only with a steady state creatinine    Digoxin level [[id number]]  (Abnormal) Collected: 11/28/24 0342    Lab Status: Final result Specimen: Blood from Arm, Left Updated: 11/28/24 0411     Digoxin Lvl 0.5 ng/mL     HS Troponin I 4hr [[id number]]     Lab Status: No result Specimen: Blood     HS Troponin 0hr (reflex protocol) [[id number]]  (Abnormal) Collected: 11/28/24 0256    Lab  Status: Final result Specimen: Blood from Arm, Left Updated: 11/28/24 0323     hs TnI 0hr 50 ng/L     HS Troponin I 2hr [[id number]]     Lab Status: No result Specimen: Blood     Ethanol [[id number]]  (Abnormal) Collected: 11/28/24 0256    Lab Status: Final result Specimen: Blood from Arm, Left Updated: 11/28/24 0317     Ethanol Lvl 123 mg/dL             XR chest 1 view portable    (Results Pending)       CriticalCare Time    Date/Time: 11/28/2024 4:38 AM    Performed by: Nathen Masterson MD  Authorized by: Nathen Masterson MD    Critical care provider statement:     Critical care time (minutes):  37    Critical care time was exclusive of:  Separately billable procedures and treating other patients and teaching time    Critical care was necessary to treat or prevent imminent or life-threatening deterioration of the following conditions:  Circulatory failure    Critical care was time spent personally by me on the following activities:  Blood draw for specimens, interpretation of cardiac output measurements, ordering and performing treatments and interventions, obtaining history from patient or surrogate, development of treatment plan with patient or surrogate, discussions with consultants, ordering and review of laboratory studies, ordering and review of radiographic studies, review of old charts, examination of patient, evaluation of patient's response to treatment and re-evaluation of patient's condition  Comments:      Treated with IV Lopressor and started on a Cardizem drip for atrial fibrillation with rapid ventricular response.  Case discussed with critical care team who evaluated patient at the bedside.      ED Medication and Procedure Management   Prior to Admission Medications   Prescriptions Last Dose Informant Patient Reported? Taking?   Blood Glucose Monitoring Suppl (ONE TOUCH ULTRA MINI) w/Device KIT  Self Yes No   Sig: Use as directed   Patient not taking: Reported on 3/25/2024   Blood Pressure  Monitoring (Adult Blood Pressure Cuff Lg) KIT   No No   Sig: Use in the morning   Patient not taking: Reported on 11/14/2024   Breo Ellipta 200-25 MCG/ACT inhaler  Self No No   Sig: Inhale 1 puff daily Rinse mouth after use.   ONETOUCH DELICA LANCETS FINE MISC  Self Yes No   Sig: 3 (three) times a day Test   Thiamine HCl (vitamin B-1) 100 MG TABS  Self No No   Sig: TAKE 1 TABLET DAILY   albuterol (Ventolin HFA) 90 mcg/act inhaler  Self No No   Sig: Inhale 2 puffs every 4 (four) hours as needed for wheezing   aluminum-magnesium hydroxide-simethicone (MAALOX) 6824-1441-469 mg/30 mL suspension   No No   Sig: Take 30 mL by mouth every 4 (four) hours as needed for indigestion or heartburn   amLODIPine (NORVASC) 5 mg tablet   No No   Sig: Take 1 tablet (5 mg total) by mouth daily   apixaban (Eliquis) 5 mg   No No   Sig: TAKE 1 TABLET BY MOUTH 2 TIMES A DAY DO NOT START BEFORE JANUARY 31, 2024.   aspirin (ECOTRIN LOW STRENGTH) 81 mg EC tablet  Self Yes No   Sig: Take 81 mg by mouth daily   digoxin (LANOXIN) 0.125 mg tablet   No No   Sig: Take 1 tablet (125 mcg total) by mouth daily   ferrous sulfate 325 (65 Fe) mg tablet  Self No No   Sig: Take 1 tablet (325 mg total) by mouth daily with breakfast   folic acid (FOLVITE) 1 mg tablet   No No   Sig: TAKE 1 TABLET DAILY   gabapentin (NEURONTIN) 300 mg capsule   No No   Sig: TAKE ONE CAPSULE THREE TIMES DAILY   lidocaine (Lidoderm) 5 %   No No   Sig: Apply 1 patch topically over 12 hours daily Remove & Discard patch within 12 hours or as directed by MD   magnesium Oxide (MAG-OX) 400 mg TABS   No No   Sig: Take 2 tablets (800 mg total) by mouth 2 (two) times a day   metFORMIN (GLUCOPHAGE) 500 mg tablet   No No   Sig: TAKE 1 TABLET DAILY WITH BREAKFAST   metoprolol tartrate (LOPRESSOR) 100 mg tablet   No No   Sig: Take 2 tablets (200 mg total) by mouth every 12 (twelve) hours   naltrexone (REVIA) 50 mg tablet   No No   Sig: Take 1 tablet (50 mg total) by mouth daily   naproxen  (Naprosyn) 500 mg tablet   No No   Sig: Take 1 tablet (500 mg total) by mouth 2 (two) times a day with meals   nicotine (NICODERM CQ) 21 mg/24 hr TD 24 hr patch  Self No No   Sig: Place 1 patch on the skin over 24 hours daily Do not start before December 4, 2023.   pantoprazole (PROTONIX) 40 mg tablet   No No   Sig: TAKE 1 TABLET TWO TIMES A DAY   ranolazine (RANEXA) 500 mg 12 hr tablet   No No   Sig: TAKE 1 TABLET EVERY 12 HOURS   senna-docusate sodium (SENOKOT S) 8.6-50 mg per tablet   No No   Sig: Take 1 tablet by mouth daily as needed for constipation   sodium chloride 1 g tablet   No No   Sig: Take 2 tablets (2 g total) by mouth 2 (two) times a day with meals   tamsulosin (FLOMAX) 0.4 mg   No No   Sig: TAKE 1 CAPSULE DAILY      Facility-Administered Medications: None     Patient's Medications   Discharge Prescriptions    No medications on file     No discharge procedures on file.  ED SEPSIS DOCUMENTATION   Time reflects when diagnosis was documented in both MDM as applicable and the Disposition within this note       Time User Action Codes Description Comment    11/28/2024  4:34 AM Nathen Masterson Add [I48.91] Atrial fibrillation with rapid ventricular response (HCC)                  Nathen Masterson MD  11/28/24 2262

## 2024-11-28 NOTE — H&P
H&P - Family Medicine   Name: Gregg Partida 64 y.o. male I MRN: [id number]  Unit/Bed#: ED CT2 I Date of Admission: 11/28/2024   Date of Service: 11/28/2024 I Hospital Day: 0     Assessment & Plan  Atrial fibrillation with RVR (HCC)  Presented to ED with c/o of chest pain and palpitations found to be in with afib RVR, HR > 140, improved in ED with Cardizem drip. Reports noncompliance with treatments and alcohol consumption since discharged on 11/25 from similar presentation. Patient discharged on high-dose metoprolol 200 mg p.o. twice daily, digoxin 125 mcg p.o.     ED Course: Cardizem gtt, Zofran 4 mg, normal saline bolus, metoprolol 5 mg, Librium 50 mg    Educated on need for compliance  Continue metoprolol 200 mg PO BID & digoxin 125 mcg PO daily  Continue home eliquis 5mg PO BID  Continue home aspirin 81 mg  Telemetry  Consider cardiology consult of persists  Alcohol abuse  Presents 3 days after last discharge, reports having several alcoholic beverages since discharge. Last beverage several hours before admission.  - ETOH: 123  CIWA protocol  Folic acid 1000 mg daily  Thiamine 100 mg daily  Noncompliance  Hx of noncompliance with home medications   Chronic heart failure with preserved ejection fraction (HCC)  Wt Readings from Last 3 Encounters:   11/25/24 81.2 kg (179 lb)   09/19/24 86.2 kg (190 lb 1.6 oz)   09/16/24 83.3 kg (183 lb 10.3 oz)     Chronic, appears euvolemic on admission, no evidence of acute exacerbation. Home medications include lopressor 200 mg BID, ranexa 500mg BID, and new amlodipine 5mg daily.   Daily weights, I&O's  Continue home Lopressor to 200mg BID and Ranexa 500mg BID   Held home amlodipine 5mg daily due to soft BPs     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 disease (HCC)  Lab Results   Component Value Date    HGBA1C 5.2 11/14/2024       Recent Labs     11/25/24  0727 11/25/24  1140   POCGLU 124 116       Blood Sugar Average: Last 72 hrs:    Chronic, home medications  include metformin 500 mg QD.      ISS and Accuchecks ACHS  Cardio-diabetic diet  Held home metformin  Continue home gabapentin 300 mg    Ambulatory dysfunction  Chronic, noting hx of several falls in setting of chronic alcohol abuse, denies new falls since last discharge, hematomas noted diffusely on skin healing. Refused home PT/OT on last discharge.  PT/OT  Chronic hyponatremia  Chronic hyponatremia likely secondary to alcohol use. POA .     Continue salt tablet to 2 g twice daily  Closed fracture of one rib of left side  CT C/A/P: Mildly displaced left lateral seventh rib fracture.      Pain control with PRN tylenol  incentive spirometry during hospitalization  conservative management at discharge  COPD (chronic obstructive pulmonary disease) (HCC)  Chronic, stable, presenting with cough that patient reports is at baseline. History of non compliance with home inhalers.  continued home Breo   continued home PRN albuterol   BPH (benign prostatic hyperplasia)  Chronic, stable     continued home flomax 0.4 mg    Code Status: Level 1 - Full Code  Admission Status: OBSERVATION  Disposition: Patient requires         History of Present Illness   Patient is 64-year-old male with significant past medical history of A-fib, diabetes, alcohol abuse, tobacco use, heart failure, hypertension, and COPD presenting to the ED for chief complaint of chest pain with shortness of breath and palpitations.  He describes it as similar to the last time he was admitted for A-fib with RVR.  Upon discharge 3 days ago he was advised to continue digoxin, Eliquis and Lopressor. Patient has not picked up any medications from hospitalization where he also refused cardioversion and was placed on Cardizem drip.  He reports to have had 2+ drinks of vodka and iced tea along with smoking 2 packs a day.  Denies any chest pain, shortness of breath or abdominal pain, nausea/vomiting currently.  Alert and oriented at bedside.  Only meal today was  breakfast.  ICU consulted and patient initiated on Cardizem drip in the ED with significant improvement of heart rate to 77.  Also given 5 mg IV Lopressor and fluid bolus resulting in stabilizing blood pressure.  Patient reports to have taken some of his regular medications this morning but unable to specify which ones.      Review of Systems   Constitutional:  Negative for chills and fever.   HENT:  Negative for ear pain, rhinorrhea, sore throat and trouble swallowing.    Eyes:  Negative for pain, redness and visual disturbance.   Respiratory:  Negative for cough and shortness of breath.    Cardiovascular:  Negative for chest pain and palpitations.   Gastrointestinal:  Negative for abdominal pain, nausea and vomiting.   Genitourinary:  Negative for difficulty urinating, dysuria, flank pain and hematuria.   Musculoskeletal:  Negative for neck pain and neck stiffness.   Skin:  Negative for color change and rash.   Neurological:  Negative for dizziness, speech difficulty and headaches.   Psychiatric/Behavioral:  Negative for confusion.      I have reviewed the patient's PMH, PSH, Social History, Family History, Meds, and Allergies    Objective :  Temp:  [97.5 °F (36.4 °C)] 97.5 °F (36.4 °C)  HR:  [] 76  BP: ()/(52-71) 114/64  Resp:  [17-20] 17  SpO2:  [90 %-100 %] 99 %  O2 Device: None (Room air)    Intake & Output:  I/O         11/26 0701  11/27 0700 11/27 0701  11/28 0700    IV Piggyback  1000    Total Intake  1000    Net  +1000                Weights:        There is no height or weight on file to calculate BMI.  Weight (last 2 days)       None          Physical Exam  Constitutional:       General: He is not in acute distress.     Appearance: He is not ill-appearing.   HENT:      Head: Normocephalic and atraumatic.      Right Ear: External ear normal.      Left Ear: External ear normal.      Nose: Nose normal. No congestion or rhinorrhea.      Mouth/Throat:      Mouth: Mucous membranes are moist.    Eyes:      General: No scleral icterus.        Right eye: No discharge.         Left eye: No discharge.      Conjunctiva/sclera: Conjunctivae normal.   Cardiovascular:      Rate and Rhythm: Normal rate.      Pulses: Normal pulses.   Pulmonary:      Effort: Pulmonary effort is normal. No respiratory distress.      Breath sounds: Normal breath sounds. No wheezing or rales.   Abdominal:      General: Abdomen is flat. There is no distension.      Palpations: Abdomen is soft.      Tenderness: There is no abdominal tenderness. There is no guarding.   Musculoskeletal:         General: No swelling or tenderness.      Cervical back: No rigidity.   Skin:     General: Skin is warm and dry.   Neurological:      General: No focal deficit present.      Mental Status: He is alert. Mental status is at baseline.   Psychiatric:         Mood and Affect: Mood normal.         Thought Content: Thought content normal.         Judgment: Judgment normal.         Lab Results: I have reviewed the following results:  Recent Labs     11/25/24  0552 11/28/24  0256 11/28/24  0342   WBC 8.05  --  8.01   HGB 10.4*  --  10.1*   HCT 31.4*  --  30.9*     --  279   SODIUM 130*  --  132*   K 4.6  --  4.1   CL 97  --  102   CO2 27  --  22   BUN 22  --  14   CREATININE 0.97  --  1.12   GLUC 104  --  101   MG 1.7*  --   --    AST 51*  --  30   ALT 74*  --  48   ALB 3.3*  --  3.3*   TBILI 0.38  --  0.35   ALKPHOS 123*  --  107*   HSTNI0  --  50*  --      Micro:  Lab Results   Component Value Date    BLOODCX No Growth After 5 Days. 11/16/2024    BLOODCX No Growth After 5 Days. 11/16/2024    BLOODCX No Growth After 5 Days. 05/10/2024    URINECX No Growth <1000 cfu/mL 10/29/2023             Currently Ordered Meds:   Current Facility-Administered Medications:     acetaminophen (TYLENOL) tablet 650 mg, Q6H PRN    albuterol (PROVENTIL HFA,VENTOLIN HFA) inhaler 2 puff, Q4H PRN    aluminum-magnesium hydroxide-simethicone (MAALOX) oral suspension 30 mL, Q4H  "PRN    [Held by provider] amLODIPine (NORVASC) tablet 5 mg, Daily    apixaban (ELIQUIS) tablet 5 mg, BID    aspirin chewable tablet 81 mg, Daily    digoxin (LANOXIN) tablet 125 mcg, Daily    diltiazem (CARDIZEM) 125 mg in sodium chloride 0.9 % 125 mL infusion, Titrated, Last Rate: 5 mg/hr (11/28/24 7623)    diltiazem (CARDIZEM) 25 mg/5 mL injection **ADS Override Pull**,     ferrous sulfate tablet 325 mg, Daily With Breakfast    fluticasone-vilanterol 200-25 mcg/actuation 1 puff, Daily    folic acid (FOLVITE) tablet 1,000 mcg, Daily    gabapentin (NEURONTIN) capsule 300 mg, TID    insulin lispro (HumALOG/ADMELOG) 100 units/mL subcutaneous injection 1-5 Units, 4x Daily (AC & HS) **AND** Fingerstick Glucose (POCT), 4x Daily AC and at bedtime    magnesium Oxide (MAG-OX) tablet 800 mg, BID    metoprolol tartrate (LOPRESSOR) tablet 200 mg, Q12H SEDA    nicotine (NICODERM CQ) 21 mg/24 hr TD 24 hr patch 1 patch, Daily    pantoprazole (PROTONIX) EC tablet 40 mg, Early Morning    ranolazine (RANEXA) 12 hr tablet 500 mg, Q12H    sodium chloride tablet 2 g, BID With Meals    tamsulosin (FLOMAX) capsule 0.4 mg, Daily    thiamine tablet 100 mg, Daily  VTE Pharmacologic Prophylaxis: VTE covered by:  apixaban, Oral      VTE Mechanical Prophylaxis: sequential compression device    Administrative Statements     Portions of the record may have been created with voice recognition software.  Occasional wrong word or \"sound a like\" substitutions may have occurred due to the inherent limitations of voice recognition software.  Read the chart carefully and recognize, using context, where substitutions have occurred.  ==  Issa Bates MD  [facility]  Family Medicine Residency PGY-1  "

## 2024-11-29 ENCOUNTER — TELEPHONE (OUTPATIENT)
Age: 64
End: 2024-11-29

## 2024-11-29 ENCOUNTER — TRANSITIONAL CARE MANAGEMENT (OUTPATIENT)
Age: 64
End: 2024-11-29

## 2024-11-29 VITALS
OXYGEN SATURATION: 96 % | HEART RATE: 100 BPM | HEIGHT: 74 IN | TEMPERATURE: 98.2 F | BODY MASS INDEX: 23.48 KG/M2 | RESPIRATION RATE: 19 BRPM | DIASTOLIC BLOOD PRESSURE: 79 MMHG | SYSTOLIC BLOOD PRESSURE: 162 MMHG | WEIGHT: 182.98 LBS

## 2024-11-29 LAB
ALBUMIN SERPL BCG-MCNC: 3.3 G/DL (ref 3.5–5)
ALP SERPL-CCNC: 86 U/L (ref 34–104)
ALT SERPL W P-5'-P-CCNC: 38 U/L (ref 7–52)
ANION GAP SERPL CALCULATED.3IONS-SCNC: 4 MMOL/L (ref 4–13)
AST SERPL W P-5'-P-CCNC: 19 U/L (ref 13–39)
ATRIAL RATE: 308 BPM
ATRIAL RATE: 326 BPM
BASOPHILS # BLD AUTO: 0.14 THOUSANDS/ΜL (ref 0–0.1)
BASOPHILS NFR BLD AUTO: 2 % (ref 0–1)
BILIRUB SERPL-MCNC: 0.32 MG/DL (ref 0.2–1)
BUN SERPL-MCNC: 22 MG/DL (ref 5–25)
CALCIUM ALBUM COR SERPL-MCNC: 8.9 MG/DL (ref 8.3–10.1)
CALCIUM SERPL-MCNC: 8.3 MG/DL (ref 8.4–10.2)
CHLORIDE SERPL-SCNC: 104 MMOL/L (ref 96–108)
CO2 SERPL-SCNC: 28 MMOL/L (ref 21–32)
CREAT SERPL-MCNC: 1.31 MG/DL (ref 0.6–1.3)
EOSINOPHIL # BLD AUTO: 0.14 THOUSAND/ΜL (ref 0–0.61)
EOSINOPHIL NFR BLD AUTO: 2 % (ref 0–6)
ERYTHROCYTE [DISTWIDTH] IN BLOOD BY AUTOMATED COUNT: 16.2 % (ref 11.6–15.1)
GFR SERPL CREATININE-BSD FRML MDRD: 57 ML/MIN/1.73SQ M
GLUCOSE SERPL-MCNC: 101 MG/DL (ref 65–140)
HCT VFR BLD AUTO: 32.7 % (ref 36.5–49.3)
HGB BLD-MCNC: 10.4 G/DL (ref 12–17)
IMM GRANULOCYTES # BLD AUTO: 0.04 THOUSAND/UL (ref 0–0.2)
IMM GRANULOCYTES NFR BLD AUTO: 1 % (ref 0–2)
LYMPHOCYTES # BLD AUTO: 1.14 THOUSANDS/ΜL (ref 0.6–4.47)
LYMPHOCYTES NFR BLD AUTO: 16 % (ref 14–44)
MAGNESIUM SERPL-MCNC: 1.7 MG/DL (ref 1.9–2.7)
MCH RBC QN AUTO: 32.4 PG (ref 26.8–34.3)
MCHC RBC AUTO-ENTMCNC: 31.8 G/DL (ref 31.4–37.4)
MCV RBC AUTO: 102 FL (ref 82–98)
MONOCYTES # BLD AUTO: 0.96 THOUSAND/ΜL (ref 0.17–1.22)
MONOCYTES NFR BLD AUTO: 14 % (ref 4–12)
NEUTROPHILS # BLD AUTO: 4.56 THOUSANDS/ΜL (ref 1.85–7.62)
NEUTS SEG NFR BLD AUTO: 65 % (ref 43–75)
NRBC BLD AUTO-RTO: 0 /100 WBCS
PHOSPHATE SERPL-MCNC: 3.3 MG/DL (ref 2.3–4.1)
PLATELET # BLD AUTO: 282 THOUSANDS/UL (ref 149–390)
PMV BLD AUTO: 10.3 FL (ref 8.9–12.7)
POTASSIUM SERPL-SCNC: 4.8 MMOL/L (ref 3.5–5.3)
PROT SERPL-MCNC: 6.3 G/DL (ref 6.4–8.4)
QRS AXIS: 104 DEGREES
QRS AXIS: 118 DEGREES
QRSD INTERVAL: 86 MS
QRSD INTERVAL: 92 MS
QT INTERVAL: 350 MS
QT INTERVAL: 358 MS
QTC INTERVAL: 477 MS
QTC INTERVAL: 493 MS
RBC # BLD AUTO: 3.21 MILLION/UL (ref 3.88–5.62)
SODIUM SERPL-SCNC: 136 MMOL/L (ref 135–147)
T WAVE AXIS: 236 DEGREES
T WAVE AXIS: 240 DEGREES
VENTRICULAR RATE: 112 BPM
VENTRICULAR RATE: 114 BPM
WBC # BLD AUTO: 6.98 THOUSAND/UL (ref 4.31–10.16)

## 2024-11-29 PROCEDURE — 94760 N-INVAS EAR/PLS OXIMETRY 1: CPT

## 2024-11-29 PROCEDURE — 99239 HOSP IP/OBS DSCHRG MGMT >30: CPT | Performed by: INTERNAL MEDICINE

## 2024-11-29 PROCEDURE — 93010 ELECTROCARDIOGRAM REPORT: CPT | Performed by: INTERNAL MEDICINE

## 2024-11-29 PROCEDURE — 83735 ASSAY OF MAGNESIUM: CPT

## 2024-11-29 PROCEDURE — 94640 AIRWAY INHALATION TREATMENT: CPT

## 2024-11-29 PROCEDURE — 84100 ASSAY OF PHOSPHORUS: CPT | Performed by: INTERNAL MEDICINE

## 2024-11-29 PROCEDURE — 85025 COMPLETE CBC W/AUTO DIFF WBC: CPT

## 2024-11-29 PROCEDURE — 80053 COMPREHEN METABOLIC PANEL: CPT

## 2024-11-29 RX ORDER — MAGNESIUM SULFATE HEPTAHYDRATE 40 MG/ML
2 INJECTION, SOLUTION INTRAVENOUS ONCE
Status: COMPLETED | OUTPATIENT
Start: 2024-11-29 | End: 2024-11-29

## 2024-11-29 RX ORDER — LEVALBUTEROL INHALATION SOLUTION 0.63 MG/3ML
0.63 SOLUTION RESPIRATORY (INHALATION) ONCE
Status: COMPLETED | OUTPATIENT
Start: 2024-11-29 | End: 2024-11-29

## 2024-11-29 RX ADMIN — FLUTICASONE FUROATE AND VILANTEROL TRIFENATATE 1 PUFF: 200; 25 POWDER RESPIRATORY (INHALATION) at 08:13

## 2024-11-29 RX ADMIN — Medication 800 MG: at 08:11

## 2024-11-29 RX ADMIN — APIXABAN 5 MG: 5 TABLET, FILM COATED ORAL at 08:12

## 2024-11-29 RX ADMIN — ASPIRIN 81 MG CHEWABLE TABLET 81 MG: 81 TABLET CHEWABLE at 08:11

## 2024-11-29 RX ADMIN — MAGNESIUM SULFATE HEPTAHYDRATE 2 G: 40 INJECTION, SOLUTION INTRAVENOUS at 07:35

## 2024-11-29 RX ADMIN — TAMSULOSIN HYDROCHLORIDE 0.4 MG: 0.4 CAPSULE ORAL at 08:11

## 2024-11-29 RX ADMIN — FOLIC ACID 1000 MCG: 1 TABLET ORAL at 08:11

## 2024-11-29 RX ADMIN — PANTOPRAZOLE SODIUM 40 MG: 40 TABLET, DELAYED RELEASE ORAL at 05:50

## 2024-11-29 RX ADMIN — SODIUM CHLORIDE 2 G: 1 TABLET ORAL at 07:35

## 2024-11-29 RX ADMIN — GABAPENTIN 300 MG: 300 CAPSULE ORAL at 08:11

## 2024-11-29 RX ADMIN — THIAMINE HCL TAB 100 MG 100 MG: 100 TAB at 08:11

## 2024-11-29 RX ADMIN — DIGOXIN 125 MCG: 125 TABLET ORAL at 08:11

## 2024-11-29 RX ADMIN — FERROUS SULFATE TAB 325 MG (65 MG ELEMENTAL FE) 325 MG: 325 (65 FE) TAB at 07:35

## 2024-11-29 RX ADMIN — METOPROLOL TARTRATE 200 MG: 100 TABLET, FILM COATED ORAL at 08:11

## 2024-11-29 RX ADMIN — RANOLAZINE 500 MG: 500 TABLET, FILM COATED, EXTENDED RELEASE ORAL at 05:50

## 2024-11-29 RX ADMIN — LEVALBUTEROL HYDROCHLORIDE 0.63 MG: 0.63 SOLUTION RESPIRATORY (INHALATION) at 08:44

## 2024-11-29 NOTE — HOSPITAL COURSE
64-year-old male with significant past medical history of A-fib, diabetes, alcohol abuse, tobacco use, heart failure, hypertension, and COPD presenting to the ED for chief complaint of chest pain with shortness of breath and palpitations.  He describes it as similar to the last time he was admitted for A-fib with RVR. He reports to have had 2+ drinks of vodka with Ice tea. Upon previous discharge 3 day prior to presentation, he was advised to continue digoxin, Eliquis and Lopressor. Patient has not  any medications from pharmacy. Pt was given 5 mg IV Lopressor and fluid bolus in ED resulting in stabilizing blood pressure. Pt refused cardioversion again and was placed on Cardizem drip and restarted on his digoxin and metoprolol. Cardizem drip was stopped the next day once his rate was controlled with oral medications. Pt did not show any alcohol withdrawal symptoms this time and did not require any ativan. Pt was discharged with digoxin 125 mcg, metoprolol 200mg BID, amlodipine 5mg daily, magnesium oxide 800 mg BID and eliquis 5mg BID. Pharmacy will deliver his meds to his home.

## 2024-11-29 NOTE — ASSESSMENT & PLAN NOTE
CT C/A/P: Mildly displaced left lateral seventh rib fracture.      Pain control with PRN tylenol  incentive spirometry during hospitalization  conservative management at discharge  
Chronic hyponatremia likely secondary to alcohol use. POA .     Continue salt tablet to 2 g twice daily  
Chronic hyponatremia likely secondary to alcohol use. POA .     Continued salt tablet to 2 g twice daily at discharge  
Chronic, appears euvolemic on admission, no evidence of acute exacerbation. Home medications include lopressor 200 mg BID, ranexa 500mg BID, and new amlodipine 5mg daily.   Daily weights, I&O's  Continue home Lopressor to 200mg BID and Ranexa 500mg BID   Held home amlodipine 5mg daily due to soft BPs          
Chronic, noting hx of several falls in setting of chronic alcohol abuse, denies new falls since last discharge, hematomas noted diffusely on skin healing.   Refused home PT/OT on last discharge.  
Chronic, noting hx of several falls in setting of chronic alcohol abuse, denies new falls since last discharge, hematomas noted diffusely on skin healing. Refused home PT/OT on last discharge.  PT/OT  
Chronic, stable     continued home flomax 0.4 mg  
Chronic, stable     continued home flomax 0.4 mg at discharge  
Chronic, stable, presenting with cough that patient reports is at baseline. History of non compliance with home inhalers.  continued home Breo   continued home PRN albuterol   
Chronic, stable, presenting with cough that patient reports is at baseline. History of non compliance with home inhalers.  continued home Breo   continued home PRN albuterol   
Chronic, stable, presenting with cough that patient reports is at baseline. History of non compliance with home inhalers.  continued home Breo  at discharge  continued home PRN albuterol at discharge  
Chronic, stable, presenting with cough that patient reports is at baseline. History of non compliance with home inhalers.  continued home Breo at discharge  continued home PRN albuterol at discharge  
Hx of noncompliance with home medications   
Hx of noncompliance with home medications     Educated importance of medication compliance  
Lab Results   Component Value Date    HGBA1C 5.2 11/14/2024       Recent Labs     11/25/24  0727 11/25/24  1140   POCGLU 124 116       Blood Sugar Average: Last 72 hrs:    Chronic, home medications include metformin 500 mg QD.      ISS and Accuchecks ACHS  Cardio-diabetic diet  Held home metformin  Continue home gabapentin 300 mg    
Lab Results   Component Value Date    HGBA1C 5.2 11/14/2024       Recent Labs     11/28/24  0714 11/28/24  1146 11/28/24  1622 11/28/24 2053   POCGLU 156* 108 144* 94         Blood Sugar Average: Last 72 hrs:  (P) 125.5  Chronic, home medications include metformin 500 mg QD.      ISS and Accuchecks ACHS during hospitalization  Cardio-diabetic diet  Continued home metformin at discharge  Continued home gabapentin 300 mg TID at discharge    
Lab Results   Component Value Date    HGBA1C 5.2 11/14/2024       Recent Labs     11/28/24  0714 11/28/24  1146 11/28/24  1622 11/28/24 2053   POCGLU 156* 108 144* 94       Blood Sugar Average: Last 72 hrs:  (P) 125.5  Chronic, home medications include metformin 500 mg QD.      ISS and Accuchecks ACHS  Cardio-diabetic diet  Held home metformin  Continue home gabapentin 300 mg TID    
Lab Results   Component Value Date    HGBA1C 5.2 11/14/2024   Chronic, home medications include metformin 500 mg QD.     ISS and Accuchecks ACHS  Cardio-diabetic diet  Held home metformin  
Presented to ED with c/o of chest pain and palpitations found to be in with afib RVR, HR > 140, improved in ED with Cardizem drip. Reports noncompliance with treatments and alcohol consumption since discharged on 11/25 from similar presentation. Patient discharged on high-dose metoprolol 200 mg p.o. twice daily, digoxin 125 mcg p.o.     ED Course: Cardizem gtt, Zofran 4 mg, normal saline bolus, metoprolol 5 mg, Librium 50 mg    Educated on need for compliance  Continue metoprolol 200 mg PO BID & digoxin 125 mcg PO daily  Continue home eliquis 5mg PO BID  Continue home aspirin 81 mg  Telemetry  Consider cardiology consult of persists  
Presented to ED with c/o of chest pain and palpitations found to be in with afib RVR, HR > 140, improved in ED with Cardizem drip. Reports noncompliance with treatments and alcohol consumption since discharged on 11/25 from similar presentation. Patient discharged on high-dose metoprolol 200 mg p.o. twice daily, digoxin 125 mcg p.o.     ED Course: Cardizem gtt, Zofran 4 mg, normal saline bolus, metoprolol 5 mg, Librium 50 mg    Educated on need for compliance  Continue metoprolol 200 mg PO BID & digoxin 125 mcg PO daily  Continue home eliquis 5mg PO BID  Continue home aspirin 81 mg  Telemetry  Consider cardiology consult of persists  
Presented to ED with c/o of chest pain and palpitations found to be in with afib RVR, HR > 140, improved in ED with Cardizem drip. Reports noncompliance with treatments and alcohol consumption since discharged on 11/25 from similar presentation. Patient discharged on high-dose metoprolol 200 mg p.o. twice daily, digoxin 125 mcg p.o.     ED Course: Cardizem gtt, Zofran 4 mg, normal saline bolus, metoprolol 5 mg, Librium 50 mg    Educated on need for compliance  Continued metoprolol 200 mg PO BID & digoxin 125 mcg PO daily at discharge  Continued home eliquis 5mg PO BID at discharge  Continued home aspirin 81 mg at discharge  Cardiology outpatient follow up  
Presented to ED with c/o of chest pain and palpitations found to be in with afib RVR, HR > 140, improved in ED with Cardizem drip. Reports noncompliance with treatments and alcohol consumption since discharged on 11/25 from similar presentation. Patient discharged on high-dose metoprolol 200 mg p.o. twice daily, digoxin 125 mcg p.o.     Educated on need for compliance  Continue metoprolol 200 mg PO BID & digoxin 125 mcg PO daily  Continue home eliquis 5mg PO BID  Telemetry  Consider cardiology consult of persists  
Presents 3 days after last discharge, reports having several alcoholic beverages since discharge. Last beverage several hours before admission.  - ETOH: 123  CIWA protocol  
Presents 3 days after last discharge, reports having several alcoholic beverages since discharge. Last beverage several hours before admission.  - ETOH: 123  CIWA protocol  Folic acid 1000 mg daily  Thiamine 100 mg daily  
Presents 3 days after last discharge, reports having several alcoholic beverages since discharge. Last beverage several hours before admission.  - ETOH: 123  CIWA protocol  Folic acid 1000 mg daily  Thiamine 100 mg daily  
Presents 3 days after last discharge, reports having several alcoholic beverages since discharge. Last beverage several hours before admission.  - ETOH: 123  CIWA protocol during hospital stay, not needing any ativan this time  Folic acid 1000 mg daily at discharge  Thiamine 100 mg daily at discharge  
Wt Readings from Last 3 Encounters:   11/25/24 81.2 kg (179 lb)   09/19/24 86.2 kg (190 lb 1.6 oz)   09/16/24 83.3 kg (183 lb 10.3 oz)     Chronic, appears euvolemic on admission, no evidence of acute exacerbation. Home medications include lopressor 200 mg BID, ranexa 500mg BID, and new amlodipine 5mg daily.   Daily weights, I&O's  Continue home Lopressor to 200mg BID and Ranexa 500mg BID   Held home amlodipine 5mg daily due to soft BPs     
Wt Readings from Last 3 Encounters:   11/28/24 83 kg (182 lb 15.7 oz)   11/25/24 81.2 kg (179 lb)   09/19/24 86.2 kg (190 lb 1.6 oz)     Chronic, appears euvolemic on admission, no evidence of acute exacerbation. Home medications include lopressor 200 mg BID, ranexa 500mg BID, and new amlodipine 5mg daily.   Daily weights, I&O's  Continue home Lopressor to 200mg BID and Ranexa 500mg BID   Held home amlodipine 5mg daily due to soft BPs     
Wt Readings from Last 3 Encounters:   11/28/24 83 kg (182 lb 15.7 oz)   11/25/24 81.2 kg (179 lb)   09/19/24 86.2 kg (190 lb 1.6 oz)     Chronic, appears euvolemic on admission, no evidence of acute exacerbation. Home medications include lopressor 200 mg BID, ranexa 500mg BID, and new amlodipine 5mg daily.   Daily weights, I&O's  Continued home amlodipine 5mg daily, Lopressor to 200mg BID and Ranexa 500mg BID at discharge  
Repeat

## 2024-11-29 NOTE — TELEPHONE ENCOUNTER
----- Message from Pablito Presley MD sent at 11/29/2024 11:13 AM EST -----  This patient is readmitted and discharged again. Please make 7 day TCM for this patient. Thank you.

## 2024-11-29 NOTE — DISCHARGE SUMMARY
Discharge Summary - Family Medicine   Name: Gregg Partida 64 y.o. male I MRN: [id number]  Unit/Bed#: ICU 12 I Date of Admission: 11/28/2024   Date of Service: 11/29/2024 I Hospital Day: 1    Medical Problems       Resolved Problems  Date Reviewed: 11/19/2024   None       Discharging Physician / Practitioner: Pablito Presley MD  PCP: Lilliana Bliss MD  Admission Date:   Admission Orders (From admission, onward)       Ordered        11/28/24 0434  INPATIENT ADMISSION  Once                          Discharge Date: 11/29/24    Consultations During Hospital Stay:  none    Procedures Performed:   none    Significant Findings / Test Results:   11/29: Na 136, Cr.1.31, Mg 1.7, Hgb 10.4  11/28: Na 132, Mg 1.6 Hgb 10.1, Trop 50 > 41>43, ETOH 123    EKG: A-flutter with variable A-V block  CXR: Mild pulmonary venous congestion with trace left effusion.    Incidental Findings:   none     Test Results Pending at Discharge (will require follow up):   none     Outpatient Tests Requested:  BMP in one week    Complications:  none    Things to follow up on:   Are you taking your digoxin 125mcg daily?  Are you taking your metoprolol 200mg BID?  Have you followed up with cardiology?  Are you taking your salt tablets 2g BID?  Are you taking Amlodipine 5 mg daily?   Have you repeated your BMP?    Reason for Admission: Afib with RVR    Hospital Course:   Gregg Partida is a 64 y.o. male patient who originally presented to the hospital on 11/28/2024 due to Afib with RVR    Hospital Course: 64-year-old male with significant past medical history of A-fib, diabetes, alcohol abuse, tobacco use, heart failure, hypertension, and COPD presenting to the ED for chief complaint of chest pain with shortness of breath and palpitations.  He describes it as similar to the last time he was admitted for A-fib with RVR. He reports to have had 2+ drinks of vodka with Ice tea. Upon previous discharge 3 day prior to presentation, he was advised to continue  digoxin, Eliquis and Lopressor. Patient has not  any medications from pharmacy. Pt was given 5 mg IV Lopressor and fluid bolus in ED resulting in stabilizing blood pressure. Pt refused cardioversion again and was placed on Cardizem drip and restarted on his digoxin and metoprolol. Cardizem drip was stopped the next day once his rate was controlled with oral medications. Pt did not show any alcohol withdrawal symptoms this time and did not require any ativan. Pt was discharged with digoxin 125 mcg, metoprolol 200mg BID, amlodipine 5mg daily, magnesium oxide 800 mg BID and eliquis 5mg BID. Pharmacy will deliver his meds to his home.     * Atrial fibrillation with RVR (HCC)  Assessment & Plan  Presented to ED with c/o of chest pain and palpitations found to be in with afib RVR, HR > 140, improved in ED with Cardizem drip. Reports noncompliance with treatments and alcohol consumption since discharged on 11/25 from similar presentation. Patient discharged on high-dose metoprolol 200 mg p.o. twice daily, digoxin 125 mcg p.o.     ED Course: Cardizem gtt, Zofran 4 mg, normal saline bolus, metoprolol 5 mg, Librium 50 mg    Educated on need for compliance  Continued metoprolol 200 mg PO BID & digoxin 125 mcg PO daily at discharge  Continued home eliquis 5mg PO BID at discharge  Continued home aspirin 81 mg at discharge  Cardiology outpatient follow up    Chronic heart failure with preserved ejection fraction (HCC)  Assessment & Plan  Wt Readings from Last 3 Encounters:   11/28/24 83 kg (182 lb 15.7 oz)   11/25/24 81.2 kg (179 lb)   09/19/24 86.2 kg (190 lb 1.6 oz)     Chronic, appears euvolemic on admission, no evidence of acute exacerbation. Home medications include lopressor 200 mg BID, ranexa 500mg BID, and new amlodipine 5mg daily.   Daily weights, I&O's  Continued home amlodipine 5mg daily, Lopressor to 200mg BID and Ranexa 500mg BID at discharge    Alcohol abuse  Assessment & Plan  Presents 3 days after last  discharge, reports having several alcoholic beverages since discharge. Last beverage several hours before admission.  - ETOH: 123  CIWA protocol during hospital stay, not needing any ativan this time  Folic acid 1000 mg daily at discharge  Thiamine 100 mg daily at discharge    Chronic hyponatremia  Assessment & Plan  Chronic hyponatremia likely secondary to alcohol use. POA .     Continued salt tablet to 2 g twice daily at discharge    Closed fracture of one rib of left side  Assessment & Plan  CT C/A/P: Mildly displaced left lateral seventh rib fracture.      Pain control with PRN tylenol  incentive spirometry during hospitalization  conservative management at discharge    BPH (benign prostatic hyperplasia)  Assessment & Plan  Chronic, stable     continued home flomax 0.4 mg at discharge    Ambulatory dysfunction  Assessment & Plan  Chronic, noting hx of several falls in setting of chronic alcohol abuse, denies new falls since last discharge, hematomas noted diffusely on skin healing.   Refused home PT/OT on last discharge.    Noncompliance  Assessment & Plan  Hx of noncompliance with home medications     Educated importance of medication compliance    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 disease (HCC)  Assessment & Plan  Lab Results   Component Value Date    HGBA1C 5.2 11/14/2024       Recent Labs     11/28/24  0714 11/28/24  1146 11/28/24  1622 11/28/24  2053   POCGLU 156* 108 144* 94         Blood Sugar Average: Last 72 hrs:  (P) 125.5  Chronic, home medications include metformin 500 mg QD.      ISS and Accuchecks ACHS during hospitalization  Cardio-diabetic diet  Continued home metformin at discharge  Continued home gabapentin 300 mg TID at discharge      COPD (chronic obstructive pulmonary disease) (HCC)  Assessment & Plan  Chronic, stable, presenting with cough that patient reports is at baseline. History of non compliance with home inhalers.  continued home Breo  at  "discharge  continued home PRN albuterol at discharge          The patient, initially admitted to the hospital as inpatient, was discharged earlier than expected given the following: rate controlled and Pt is not showing alcohol withdrawal symptoms.  Please see above list of diagnoses and related plan for additional information.     Condition at Discharge: stable    Discharge Day Visit / Exam:   Subjective:  Pt was seen and examined at the bedside, NAD. Pt is feeling better and wants to go home. Pt did not have any other acute complaints. Pt had BM last night and urinating well.     Vitals: Blood Pressure: 162/79 (11/29/24 0811)  Pulse: 100 (11/29/24 0811)  Temperature: 98.2 °F (36.8 °C) (11/29/24 0702)  Temp Source: Temporal (11/29/24 0702)  Respirations: 19 (11/29/24 0400)  Height: 6' 2\" (188 cm) (11/28/24 0641)  Weight - Scale: 83 kg (182 lb 15.7 oz) (11/28/24 0641)  SpO2: 96 % (11/29/24 0400)  Physical Exam  Vitals reviewed.   Constitutional:       General: He is not in acute distress.     Appearance: Normal appearance. He is not ill-appearing.   HENT:      Head: Normocephalic and atraumatic.      Right Ear: External ear normal.      Left Ear: External ear normal.      Nose: Nose normal. No congestion or rhinorrhea.      Mouth/Throat:      Mouth: Mucous membranes are moist.      Pharynx: Oropharynx is clear. No oropharyngeal exudate or posterior oropharyngeal erythema.   Eyes:      General:         Right eye: No discharge.         Left eye: No discharge.      Conjunctiva/sclera: Conjunctivae normal.   Cardiovascular:      Rate and Rhythm: Tachycardia present. Rhythm irregular.      Pulses: Normal pulses.      Heart sounds: Normal heart sounds. No murmur heard.     No friction rub. No gallop.   Pulmonary:      Effort: Pulmonary effort is normal. No respiratory distress.      Breath sounds: No stridor. Wheezing (mild expiratoy wheezing on the left lung base) and rhonchi (bronchi sound referred from upper airway " mucus) present. No rales.   Chest:      Chest wall: No tenderness.   Abdominal:      General: Abdomen is flat. Bowel sounds are normal. There is no distension.      Palpations: Abdomen is soft.      Tenderness: There is no abdominal tenderness. There is no guarding.   Musculoskeletal:         General: No swelling, tenderness, deformity or signs of injury. Normal range of motion.      Cervical back: Normal range of motion and neck supple. No rigidity or tenderness.      Right lower leg: No edema.      Left lower leg: No edema.   Lymphadenopathy:      Cervical: No cervical adenopathy.   Skin:     General: Skin is warm and dry.      Capillary Refill: Capillary refill takes less than 2 seconds.      Findings: No bruising, erythema, lesion or rash.   Neurological:      General: No focal deficit present.      Mental Status: He is alert and oriented to person, place, and time. Mental status is at baseline.      Motor: No weakness.      Gait: Gait normal.      Deep Tendon Reflexes: Reflexes normal.   Psychiatric:         Mood and Affect: Mood normal.         Behavior: Behavior normal.         Thought Content: Thought content normal.          Discussion with Family: Patient declined call to .     Discharge instructions/Information to patient and family:   See after visit summary for information provided to patient and family.      Provisions for Follow-Up Care:  See after visit summary for information related to follow-up care and any pertinent home health orders.      Mobility at time of Discharge:   Basic Mobility Inpatient Raw Score: 22  JH-HLM Goal: 7: Walk 25 feet or more  JH-HLM Achieved: 7: Walk 25 feet or more  HLM Goal achieved. Continue to encourage appropriate mobility.     Disposition:   Home    Planned Readmission: none    Discharge Medications:  See after visit summary for reconciled discharge medications provided to patient and/or family.      Administrative Statements   Discharge Statement:  I  have spent a total time of >45 minutes in caring for this patient on the day of the visit/encounter. .    **Please Note: This note may have been constructed using a voice recognition system**

## 2024-11-29 NOTE — PLAN OF CARE
Problem: Prexisting or High Potential for Compromised Skin Integrity  Goal: Skin integrity is maintained or improved  Description: INTERVENTIONS:  - Identify patients at risk for skin breakdown  - Assess and monitor skin integrity  - Assess and monitor nutrition and hydration status  - Monitor labs   - Assess for incontinence   - Turn and reposition patient  - Assist with mobility/ambulation  - Relieve pressure over bony prominences  - Avoid friction and shearing  - Provide appropriate hygiene as needed including keeping skin clean and dry  - Evaluate need for skin moisturizer/barrier cream  - Collaborate with interdisciplinary team   - Patient/family teaching  - Consider wound care consult   Outcome: Progressing     Problem: PAIN - ADULT  Goal: Verbalizes/displays adequate comfort level or baseline comfort level  Description: Interventions:  - Encourage patient to monitor pain and request assistance  - Assess pain using appropriate pain scale  - Administer analgesics based on type and severity of pain and evaluate response  - Implement non-pharmacological measures as appropriate and evaluate response  - Consider cultural and social influences on pain and pain management  - Notify physician/advanced practitioner if interventions unsuccessful or patient reports new pain  Outcome: Progressing     Problem: INFECTION - ADULT  Goal: Absence or prevention of progression during hospitalization  Description: INTERVENTIONS:  - Assess and monitor for signs and symptoms of infection  - Monitor lab/diagnostic results  - Monitor all insertion sites, i.e. indwelling lines, tubes, and drains  - Monitor endotracheal if appropriate and nasal secretions for changes in amount and color  - [place] appropriate cooling/warming therapies per order  - Administer medications as ordered  - Instruct and encourage patient and family to use good hand hygiene technique  - Identify and instruct in appropriate isolation precautions for  identified infection/condition  Outcome: Progressing     Problem: SAFETY ADULT  Goal: Patient will remain free of falls  Description: INTERVENTIONS:  - Educate patient/family on patient safety including physical limitations  - Instruct patient to call for assistance with activity   - Consult OT/PT to assist with strengthening/mobility   - Keep Call bell within reach  - Keep bed low and locked with side rails adjusted as appropriate  - Keep care items and personal belongings within reach  - Initiate and maintain comfort rounds  - Make Fall Risk Sign visible to staff  - Offer Toileting every 4 Hours, in advance of need  - Initiate/Maintain bed alarm  - Obtain necessary fall risk management equipment: non-slip socks  - Apply yellow socks and bracelet for high fall risk patients  - Consider moving patient to room near nurses station  Outcome: Progressing  Goal: Maintain or return to baseline ADL function  Description: INTERVENTIONS:  -  Assess patient's ability to carry out ADLs; assess patient's baseline for ADL function and identify physical deficits which impact ability to perform ADLs (bathing, care of mouth/teeth, toileting, grooming, dressing, etc.)  - Assess/evaluate cause of self-care deficits   - Assess range of motion  - Assess patient's mobility; develop plan if impaired  - Assess patient's need for assistive devices and provide as appropriate  - Encourage maximum independence but intervene and supervise when necessary  - Involve family in performance of ADLs  - Assess for home care needs following discharge   - Consider OT consult to assist with ADL evaluation and planning for discharge  - Provide patient education as appropriate  Outcome: Progressing  Goal: Maintains/Returns to pre admission functional level  Description: INTERVENTIONS:  - Perform AM-PAC 6 Click Basic Mobility/ Daily Activity assessment daily.  - Set and communicate daily mobility goal to care team and patient/family/caregiver.   -  Collaborate with rehabilitation services on mobility goals if consulted  - Perform Range of Motion 4 times a day.  - Reposition patient every 2 hours.  - Dangle patient 3 times a day  - Stand patient 3 times a day  - Ambulate patient 3 times a day  - Out of bed to chair 3 times a day   - Out of bed for meals 3 times a day  - Out of bed for toileting  - Record patient progress and toleration of activity level   Outcome: Progressing     Problem: DISCHARGE PLANNING  Goal: Discharge to home or other facility with appropriate resources  Description: INTERVENTIONS:  - Identify barriers to discharge w/patient and caregiver  - Arrange for needed discharge resources and transportation as appropriate  - Identify discharge learning needs (meds, wound care, etc.)  - Arrange for interpretive services to assist at discharge as needed  - Refer to Case Management Department for coordinating discharge planning if the patient needs post-hospital services based on physician/advanced practitioner order or complex needs related to functional status, cognitive ability, or social support system  Outcome: Progressing

## 2024-11-29 NOTE — PROGRESS NOTES
Progress Note - Family Medicine   Name: Gregg Partida 64 y.o. male I MRN: [id number]  Unit/Bed#: ICU 12 I Date of Admission: 11/28/2024   Date of Service: 11/29/2024 I Hospital Day: 1     Assessment & Plan  Atrial fibrillation with RVR (HCC)  Presented to ED with c/o of chest pain and palpitations found to be in with afib RVR, HR > 140, improved in ED with Cardizem drip. Reports noncompliance with treatments and alcohol consumption since discharged on 11/25 from similar presentation. Patient discharged on high-dose metoprolol 200 mg p.o. twice daily, digoxin 125 mcg p.o.     ED Course: Cardizem gtt, Zofran 4 mg, normal saline bolus, metoprolol 5 mg, Librium 50 mg    Educated on need for compliance  Continue metoprolol 200 mg PO BID & digoxin 125 mcg PO daily  Continue home eliquis 5mg PO BID  Continue home aspirin 81 mg  Telemetry  Consider cardiology consult of persists  Alcohol abuse  Presents 3 days after last discharge, reports having several alcoholic beverages since discharge. Last beverage several hours before admission.  - ETOH: 123  CIWA protocol  Folic acid 1000 mg daily  Thiamine 100 mg daily  Noncompliance  Hx of noncompliance with home medications   Chronic heart failure with preserved ejection fraction (HCC)  Wt Readings from Last 3 Encounters:   11/28/24 83 kg (182 lb 15.7 oz)   11/25/24 81.2 kg (179 lb)   09/19/24 86.2 kg (190 lb 1.6 oz)     Chronic, appears euvolemic on admission, no evidence of acute exacerbation. Home medications include lopressor 200 mg BID, ranexa 500mg BID, and new amlodipine 5mg daily.   Daily weights, I&O's  Continue home Lopressor to 200mg BID and Ranexa 500mg BID   Held home amlodipine 5mg daily due to soft BPs     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 disease (HCC)  Lab Results   Component Value Date    HGBA1C 5.2 11/14/2024       Recent Labs     11/28/24  0714 11/28/24  1146 11/28/24  1622 11/28/24 2053   POCGLU 156* 108 144* 94       Blood Sugar  Average: Last 72 hrs:  (P) 125.5  Chronic, home medications include metformin 500 mg QD.      ISS and Accuchecks ACHS  Cardio-diabetic diet  Held home metformin  Continue home gabapentin 300 mg TID    Ambulatory dysfunction  Chronic, noting hx of several falls in setting of chronic alcohol abuse, denies new falls since last discharge, hematomas noted diffusely on skin healing. Refused home PT/OT on last discharge.  PT/OT  Chronic hyponatremia  Chronic hyponatremia likely secondary to alcohol use. POA .     Continue salt tablet to 2 g twice daily  Closed fracture of one rib of left side  CT C/A/P: Mildly displaced left lateral seventh rib fracture.      Pain control with PRN tylenol  incentive spirometry during hospitalization  conservative management at discharge  COPD (chronic obstructive pulmonary disease) (HCC)  Chronic, stable, presenting with cough that patient reports is at baseline. History of non compliance with home inhalers.  continued home Breo   continued home PRN albuterol   BPH (benign prostatic hyperplasia)  Chronic, stable     continued home flomax 0.4 mg    VTE Pharmacologic Prophylaxis: VTE Score: 3 Moderate Risk (Score 3-4) - Pharmacological DVT Prophylaxis Ordered: apixaban (Eliquis).    Mobility:   Basic Mobility Inpatient Raw Score: 22  JH-HLM Goal: 7: Walk 25 feet or more  JH-HLM Achieved: 3: Sit at edge of bed  JH-HLM Goal NOT achieved. Continue with multidisciplinary rounding and encourage appropriate mobility to improve upon JH-HLM goals.    Patient Centered Rounds: I performed bedside rounds with nursing staff today.   Discussions with Specialists or Other Care Team Provider: none    Education and Discussions with Family / Patient: Patient declined call to .     Current Length of Stay: 1 day(s)  Current Patient Status: Inpatient   Certification Statement: The patient will continue to require additional inpatient hospital stay due to Afib RVR and alcohol intoxication    Discharge Plan: Anticipate discharge in 24-48 hrs to pending PT/OT    Code Status: Level 1 - Full Code    Subjective   Pt was seen and examined at the bedside, NAD. Pt is feeling better and wants to go home. Pt did not have any other acute complaints. Pt had BM last night and urinating well.      Objective :  Temp:  [97.4 °F (36.3 °C)-98.2 °F (36.8 °C)] 98.2 °F (36.8 °C)  HR:  [] 81  BP: ()/(52-89) 135/89  Resp:  [16-24] 19  SpO2:  [96 %-100 %] 96 %  O2 Device: Nasal cannula  Nasal Cannula O2 Flow Rate (L/min):  [2 L/min] 2 L/min    Body mass index is 23.49 kg/m².     Input and Output Summary (last 24 hours):     Intake/Output Summary (Last 24 hours) at 11/29/2024 0736  Last data filed at 11/29/2024 0600  Gross per 24 hour   Intake 1649.25 ml   Output 1975 ml   Net -325.75 ml       Physical Exam  Vitals reviewed.   Constitutional:       General: He is not in acute distress.     Appearance: Normal appearance. He is not ill-appearing.   HENT:      Head: Normocephalic and atraumatic.      Right Ear: External ear normal.      Left Ear: External ear normal.      Nose: Nose normal. No congestion or rhinorrhea.      Mouth/Throat:      Mouth: Mucous membranes are moist.      Pharynx: Oropharynx is clear. No oropharyngeal exudate or posterior oropharyngeal erythema.   Eyes:      General:         Right eye: No discharge.         Left eye: No discharge.      Conjunctiva/sclera: Conjunctivae normal.   Cardiovascular:      Rate and Rhythm: Tachycardia present. Rhythm irregular.      Pulses: Normal pulses.      Heart sounds: Normal heart sounds. No murmur heard.     No friction rub. No gallop.   Pulmonary:      Effort: Pulmonary effort is normal. No respiratory distress.      Breath sounds: No stridor. Wheezing (mild expieratory wheezing left lung base) and rhonchi (referral bronchi sound from upper airway mucus) present. No rales.   Chest:      Chest wall: No tenderness.   Abdominal:      General: Abdomen is flat.  Bowel sounds are normal. There is no distension.      Palpations: Abdomen is soft.      Tenderness: There is no abdominal tenderness. There is no guarding.   Musculoskeletal:         General: No swelling, tenderness, deformity or signs of injury. Normal range of motion.      Cervical back: Normal range of motion and neck supple. No rigidity or tenderness.      Right lower leg: No edema.      Left lower leg: No edema.   Lymphadenopathy:      Cervical: No cervical adenopathy.   Skin:     General: Skin is warm and dry.      Capillary Refill: Capillary refill takes less than 2 seconds.      Findings: No bruising, erythema, lesion or rash.   Neurological:      General: No focal deficit present.      Mental Status: He is alert and oriented to person, place, and time. Mental status is at baseline.      Motor: No weakness.      Gait: Gait normal.      Deep Tendon Reflexes: Reflexes normal.   Psychiatric:         Mood and Affect: Mood normal.         Behavior: Behavior normal.         Thought Content: Thought content normal.         Lines/Drains:        Telemetry:  Telemetry Orders (From admission, onward)               24 Hour Telemetry Monitoring  Continuous x 24 Hours (Telem)        Question:  Reason for 24 Hour Telemetry  Answer:  Arrhythmias requiring acute medical intervention / PPM or ICD malfunction                     Telemetry Reviewed: Atrial flutter. HR averaging   Indication for Continued Telemetry Use: Arrthymias requiring medical therapy               Lab Results: I have reviewed the following results:   Results from last 7 days   Lab Units 11/29/24  0549   WBC Thousand/uL 6.98   HEMOGLOBIN g/dL 10.4*   HEMATOCRIT % 32.7*   PLATELETS Thousands/uL 282   SEGS PCT % 65   LYMPHO PCT % 16   MONO PCT % 14*   EOS PCT % 2     Results from last 7 days   Lab Units 11/29/24  0549   SODIUM mmol/L 136   POTASSIUM mmol/L 4.8   CHLORIDE mmol/L 104   CO2 mmol/L 28   BUN mg/dL 22   CREATININE mg/dL 1.31*   ANION GAP  mmol/L 4   CALCIUM mg/dL 8.3*   ALBUMIN g/dL 3.3*   TOTAL BILIRUBIN mg/dL 0.32   ALK PHOS U/L 86   ALT U/L 38   AST U/L 19   GLUCOSE RANDOM mg/dL 101         Results from last 7 days   Lab Units 11/28/24  2053 11/28/24  1622 11/28/24  1146 11/28/24  0714 11/25/24  1140 11/25/24  0727 11/24/24  2112 11/24/24  1559 11/24/24  1115 11/24/24  0719 11/23/24  2023 11/23/24  1609   POC GLUCOSE mg/dl 94 144* 108 156* 116 124 163* 146* 160* 105 119 122               Recent Cultures (last 7 days):         Imaging Results Review: I reviewed radiology reports from this admission including: chest xray.  Other Study Results Review: EKG was reviewed.     Last 24 Hours Medication List:     Current Facility-Administered Medications:     acetaminophen (TYLENOL) tablet 650 mg, Q6H PRN    albuterol (PROVENTIL HFA,VENTOLIN HFA) inhaler 2 puff, Q4H PRN    aluminum-magnesium hydroxide-simethicone (MAALOX) oral suspension 30 mL, Q4H PRN    apixaban (ELIQUIS) tablet 5 mg, BID    aspirin chewable tablet 81 mg, Daily    digoxin (LANOXIN) tablet 125 mcg, Daily    ferrous sulfate tablet 325 mg, Daily With Breakfast    fluticasone-vilanterol 200-25 mcg/actuation 1 puff, Daily    folic acid (FOLVITE) tablet 1,000 mcg, Daily    gabapentin (NEURONTIN) capsule 300 mg, TID    insulin lispro (HumALOG/ADMELOG) 100 units/mL subcutaneous injection 1-5 Units, 4x Daily (AC & HS) **AND** Fingerstick Glucose (POCT), 4x Daily AC and at bedtime    levalbuterol (XOPENEX) inhalation solution 0.63 mg, Once    magnesium Oxide (MAG-OX) tablet 800 mg, BID    magnesium sulfate 2 g/50 mL IVPB (premix) 2 g, Once, Last Rate: 2 g (11/29/24 0735)    metoprolol tartrate (LOPRESSOR) tablet 200 mg, Q12H SEDA    nicotine (NICODERM CQ) 21 mg/24 hr TD 24 hr patch 1 patch, Daily    pantoprazole (PROTONIX) EC tablet 40 mg, Early Morning    ranolazine (RANEXA) 12 hr tablet 500 mg, Q12H    sodium chloride tablet 2 g, BID With Meals    tamsulosin (FLOMAX) capsule 0.4 mg, Daily     thiamine tablet 100 mg, Daily    trimethobenzamide (TIGAN) IM injection 200 mg, Q6H PRN    Administrative Statements   Today, Patient Was Seen By: Pablito Presley MD      **Please Note: This note may have been constructed using a voice recognition system.**

## 2024-11-29 NOTE — CASE MANAGEMENT
Case Management Assessment & Discharge Planning Note    Patient name Gregg Partida  Location ICU 12/ICU 12 MRN [id number]  : 1960 Date 2024       Current Admission Date: 2024  Current Admission Diagnosis:Atrial fibrillation with RVR (HCC)   Patient Active Problem List    Diagnosis Date Noted Date Diagnosed    Chronic hyponatremia 2024     Closed fracture of one rib of left side 2024     Ambulatory dysfunction 2024     Financial insecurity 2024     Iron deficiency 2024     BPH (benign prostatic hyperplasia) 2024     Chronic alcohol use 2024     Fall, initial encounter 2024     Hypertension 2024     Hemorrhagic cystitis 2024     Hypothyroidism 2024     Gastrointestinal hemorrhage with melena 10/29/2023     Abnormal thyroid stimulating hormone level 2023     Chest pain 2023     Paroxysmal atrial fibrillation (HCC) 2023     GERD (gastroesophageal reflux disease) 2023     Sleep apnea 2023     Stable angina (HCC) 2022     Stage 3a chronic kidney disease (HCC) 2022     Fatty liver, alcoholic 04/15/2022     Nonischemic nontraumatic myocardial injury 04/10/2022     History of atrial fibrillation 10/25/2021     Mild protein-calorie malnutrition (HCC) 2021     Prostate cancer (HCC) 2021     Atrial fibrillation with RVR (HCC) 2020     Chronic heart failure with preserved ejection fraction (HCC) 2019     Noncompliance 2019     Alcohol abuse 2019     Alcohol withdrawal syndrome with complication (HCC) 10/17/2019     Electrolyte abnormality 10/17/2019     Cervical spinal stenosis 10/17/2019     History of prostate cancer 2019     CAD (coronary artery disease) 2019     Nicotine abuse 2019     Hypomagnesemia 10/10/2018     Depression, recurrent (HCC) 10/10/2018     Hx of CABG 10/10/2018     Dyslipidemia 10/10/2018     COPD (chronic obstructive  pulmonary disease) (formerly [facility]) 10/09/2018     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 disease (HCC) 10/09/2018     Hypertensive heart and chronic kidney disease with heart failure and stage 1 through stage 4 chronic kidney disease, or chronic kidney disease (HCC) 10/09/2018       LOS (days): 1  Geometric Mean LOS (GMLOS) (days):   Days to GMLOS:     OBJECTIVE:  PATIENT READMITTED TO HOSPITAL  Risk of Unplanned Readmission Score: 68.71         Current admission status: Inpatient       Preferred Pharmacy:   [facility] [address] [address]  Phone: [phone] Fax: [phone]    [facility] [address] [place] [address] - [address]  [address]  [facility] [address]  Phone: [phone] Fax: [phone]    Primary Care Provider: Lilliana Bliss MD    Primary Insurance: [employer] REP  Secondary Insurance:     ASSESSMENT:  Active Health Care Proxies       Gregg Partida  Health Care Representative - Son   Primary Phone: [phone] (Mobile)                           Readmission Root Cause  30 Day Readmission: Yes  During your hospital stay, did someone (provider, nurse, ) explain your care to you in a way you could understand?: Yes  Did you feel medically stable to leave the hospital?: Yes  Were you able to pay for your medication at the pharmacy?: Yes  Did you have reliable transportation to take you to your appointments?: Yes  During previous admission, was a post-acute recommendation made?: No  Patient was readmitted due to: Atrial fibrilation  Action Plan: Medical management.    Patient Information  Admitted from:: Home  Mental Status: Alert  During Assessment patient was accompanied by: Not accompanied during assessment  Assessment information provided by:: Patient  Primary Caregiver: Self  Support Systems: Self  County of Residence: [place]  What city do you live in?: [place]  Home entry access  options. Select all that apply.: Elevator  Type of Current Residence: Apartment  Floor Level: 5  Upon entering residence, is there a bedroom on the main floor (no further steps)?: Yes  Upon entering residence, is there a bathroom on the main floor (no further steps)?: Yes  Living Arrangements: Lives Alone  Is patient a ?: No    Activities of Daily Living Prior to Admission  Functional Status: Independent  Completes ADLs independently?: Yes  Ambulates independently?: Yes  Does patient use assisted devices?: Yes  Assisted Devices (DME) used: Walker  Does patient currently own DME?: Yes  What DME does the patient currently own?: Walker  Does patient have a history of Outpatient Therapy (PT/OT)?: Yes  Does the patient have a history of Short-Term Rehab?: No  Does patient have a history of HHC?: No  Does patient currently have HHC?: No         Patient Information Continued  Income Source: SSI/SSD  Does patient have prescription coverage?: Yes  Does patient receive dialysis treatments?: No  Does patient have a history of substance abuse?: No, Yes (Pt reports no, did test positive for alcohol on admission.)  Historical substance use preference: Alcohol/ETOH  History of Withdrawal Symptoms: Denies past symptoms  Is patient currently in treatment for substance abuse?: No. Patient declined treatment information.  Does patient have a history of Mental Health Diagnosis?: No         Means of Transportation  Means of Transport to Appts:: Himanshu Amanda      Social Determinants of Health (SDOH)      Flowsheet Row Most Recent Value   Housing Stability    In the last 12 months, was there a time when you were not able to pay the mortgage or rent on time? N   In the past 12 months, how many times have you moved where you were living? 0   At any time in the past 12 months, were you homeless or living in a shelter (including now)? N   Transportation Needs    In the past 12 months, has lack of transportation kept you from medical  appointments or from getting medications? no   In the past 12 months, has lack of transportation kept you from meetings, work, or from getting things needed for daily living? No   Food Insecurity    Within the past 12 months, you worried that your food would run out before you got the money to buy more. Never true   Within the past 12 months, the food you bought just didn't last and you didn't have money to get more. Never true   Utilities    In the past 12 months has the electric, gas, oil, or water company threatened to shut off services in your home? No            DISCHARGE DETAILS:    Discharge planning discussed with:: Pt at bedside.  Freedom of Choice: Yes  Comments - Freedom of Choice: No rehab needs.  CM contacted family/caregiver?: No- see comments (Pt declined. Pt alert and oriented and able to make own decisions.)  Were Treatment Team discharge recommendations reviewed with patient/caregiver?: Yes  Did patient/caregiver verbalize understanding of patient care needs?: Yes  Were patient/caregiver advised of the risks associated with not following Treatment Team discharge recommendations?: Yes    Contacts  Patient Contacts: Patient  Relationship to Patient:: Other (Comment) (Self)  Contact Method: In Person  Reason/Outcome: Continuity of Care, Discharge Planning    Requested Home Health Care         Is the patient interested in HHC at discharge?: No    DME Referral Provided  Referral made for DME?: No    Other Referral/Resources/Interventions Provided:  Referral Comments: No referrals made at this time. No rehab needs.  Programs:: COPD, CHF    Transport at Discharge : Ride Share        Transported by (Company and Unit #): [place]  ETA of Transport (Date): 11/29/24  ETA of Transport (Time): 1100     Additional Comments: CM met with patient at bedside and introduced self and role. Pt is 30-day re-admit for AFIB. Pt still resides in 5th floor apartment with elevator access. Pt uses walker at baseline and is  independent with ADL's. Pt denied hx of STR and HHC. Pt denied hx of MH and substance abuse. Per chart review pt does have alcohol use disorder diagnosis, but denied this to CM. Pt requesting Lyft ride home on discharge. Lyft setup at this time.

## 2024-11-29 NOTE — UTILIZATION REVIEW
Initial Clinical Review    Admission: Date/Time/Statement:   Admission Orders (From admission, onward)       Ordered        11/28/24 0434  INPATIENT ADMISSION  Once                          Orders Placed This Encounter   Procedures    INPATIENT ADMISSION     Standing Status:   Standing     Number of Occurrences:   1     Level of Care:   Med Surg [16]     Estimated length of stay:   More than 2 Midnights     Certification:   I certify that inpatient services are medically necessary for this patient for a duration of greater than two midnights. See H&P and MD Progress Notes for additional information about the patient's course of treatment.     ED Arrival Information       Expected   -    Arrival   11/28/2024 02:37    Acuity   Emergent              Means of arrival   Ambulance    Escorted by   [facility]    Service   Family Medicine    Admission type   Emergency              Arrival complaint   Chest Pain             Chief Complaint   Patient presents with    Chest Pain     Patient reports cp starting 4 hours ago, did not take anything for, vomited 1 time, does admit to drinking 2 beers       Initial Presentation: 64 y.o. male to ED via EMS from home.    Admitted to inpatient with Dx: Atrial Fibrillation with RVR.    Presented to ED with Chest pain and palpitation.   PMHx:diabetes, alcohol abuse, COPD on no home oxygen requirement, atrial fibrillation .   Date: 11/28/2024   On exam: In Acute Moderate Distress.  Tachycardia present. Rhythm irregularly irregular.   : No increased work of breathing.  Speaking in complete sentences.  Lungs clear to auscultation bilaterally without wheezes, rales, rhonchi.  Satting 98% on room air indicating adequate oxygenation however when sleeping oxygen dropping into the high 80s on room air.  Placed on 2 L nasal cannula.  Abd-: Abdomen soft, nontender, nondistended without rigidity, rebound, guarding .  Mental status is at baseline.   Imaging shows Chest X: Mild pulmonary  venous congestion with trace left effusion.  . ED treatment:  Labs.  Radiology.  ECG:   afib RVR, HR > 140, IV cardizem bolus & gtt. IV zofran.  IV flds - NSS.  PO metoprolol & Librium.       Plan includes Telemetry.  Continue metoprolol 200 mg PO BID & digoxin 125 mcg PO daily.  Continue home eliquis 5mg PO BID.  Continue home aspirin 81 mg.      Anticipated Length of Stay/Certification Statement: Inpatient services are medically necessary for this patient for a duration of greater than two midnights.     Day 2: 11/29/2024  Telemetry.  Metoprolol.  Eliquis.  ASA.  CIWA.  Thiamine & Folic Acid.  Daily weight.  I&O.  PT/OT.        ED Treatment-Medication Administration from 11/28/2024 0237 to 11/28/2024 0614         Date/Time Order Dose Route Action     11/28/2024 0301 ondansetron (ZOFRAN) injection 4 mg 4 mg Intravenous Given     11/28/2024 0253 sodium chloride 0.9 % bolus 1,000 mL 1,000 mL Intravenous New Bag     11/28/2024 0306 metoprolol (LOPRESSOR) injection 5 mg 5 mg Intravenous Given     11/28/2024 0337 chlordiazePOXIDE (LIBRIUM) capsule 50 mg 50 mg Oral Given     11/28/2024 0355 diltiazem (CARDIZEM) 125 mg in sodium chloride 0.9 % 125 mL infusion 5 mg/hr Intravenous New Bag     11/28/2024 0433 diltiazem (CARDIZEM) 125 mg in sodium chloride 0.9 % 125 mL infusion 5 mg/hr Intravenous Rate/Dose Change     11/28/2024 0434 diltiazem (CARDIZEM) 125 mg in sodium chloride 0.9 % 125 mL infusion 5 mg/hr Intravenous Continue to Inpatient Floor     11/28/2024 0601 diltiazem (CARDIZEM) 125 mg in sodium chloride 0.9 % 125 mL infusion 7.5 mg/hr Intravenous Rate/Dose Change            Scheduled Medications:  apixaban, 5 mg, Oral, BID  aspirin, 81 mg, Oral, Daily  digoxin, 125 mcg, Oral, Daily  ferrous sulfate, 325 mg, Oral, Daily With Breakfast  fluticasone-vilanterol, 1 puff, Inhalation, Daily  folic acid, 1,000 mcg, Oral, Daily  gabapentin, 300 mg, Oral, TID  insulin lispro, 1-5 Units, Subcutaneous, 4x Daily (AC &  HS)  magnesium Oxide, 800 mg, Oral, BID  metoprolol tartrate, 200 mg, Oral, Q12H SEDA  nicotine, 1 patch, Transdermal, Daily  pantoprazole, 40 mg, Oral, Early Morning  ranolazine, 500 mg, Oral, Q12H  sodium chloride, 2 g, Oral, BID With Meals  tamsulosin, 0.4 mg, Oral, Daily  vitamin B-1, 100 mg, Oral, Daily      Continuous IV Infusions:     PRN Meds:  acetaminophen, 650 mg, Oral, Q6H PRN  albuterol, 2 puff, Inhalation, Q4H PRN  aluminum-magnesium hydroxide-simethicone, 30 mL, Oral, Q4H PRN  trimethobenzamide, 200 mg, Intramuscular, Q6H PRN      ED Triage Vitals   Temperature Pulse Respirations Blood Pressure SpO2 Pain Score   11/28/24 0258 11/28/24 0239 11/28/24 0239 11/28/24 0239 11/28/24 0240 11/28/24 0643   97.5 °F (36.4 °C) (!) 140 18 94/59 97 % 5     Weight (last 2 days)       Date/Time Weight    11/28/24 0641 83 (182.98)            Vital Signs (last 3 days)       Date/Time Temp Pulse Resp BP MAP (mmHg) SpO2 Calculated FIO2 (%) - Nasal Cannula Nasal Cannula O2 Flow Rate (L/min) O2 Device Patient Position - Orthostatic VS CIWA-Ar Total Pain    11/29/24 0846 -- -- -- -- -- -- -- -- None (Room air) -- -- --    11/29/24 0811 -- 100 -- 162/79 -- -- -- -- -- -- -- No Pain    11/29/24 0702 98.2 °F (36.8 °C) -- -- -- -- -- -- -- -- -- -- --    11/29/24 0700 -- -- -- 135/89 -- -- -- -- -- -- 6 --    11/29/24 0400 -- 81 19 123/73 92 96 % -- -- -- -- 0 No Pain    11/29/24 0000 97.6 °F (36.4 °C) 76 20 111/61 81 98 % -- -- -- -- 0 No Pain    11/28/24 2145 -- 78 -- 120/58 -- -- -- -- -- -- -- --    11/28/24 2010 -- 78 16 100/57 75 99 % -- -- -- -- 0 --    11/28/24 2000 -- -- -- -- -- -- -- -- -- -- -- No Pain    11/28/24 1900 97.4 °F (36.3 °C) -- -- -- -- -- -- -- -- -- -- --    11/28/24 1800 -- 74 18 121/67 89 100 % -- -- -- -- -- --    11/28/24 1600 97.8 °F (36.6 °C) 74 22 124/62 85 100 % 28 2 L/min Nasal cannula Lying 3 No Pain    11/28/24 1400 -- 75 22 103/56 77 100 % -- -- -- -- -- --    11/28/24 1300 -- 74 20 117/64 85  96 % -- -- -- -- -- --    11/28/24 1200 98 °F (36.7 °C) 75 18 97/52 70 97 % 28 2 L/min Nasal cannula Lying 0 No Pain    11/28/24 1130 -- 75 22 106/55 74 -- -- -- -- -- -- --    11/28/24 1100 -- 79 20 101/55 74 -- -- -- -- -- -- --    11/28/24 1045 -- 77 22 -- -- -- -- -- -- -- -- --    11/28/24 1030 -- 77 18 93/54 68 -- -- -- -- -- -- --    11/28/24 1000 -- 102 20 127/64 92 -- -- -- -- -- -- --    11/28/24 0930 -- 104 20 125/66 89 -- -- -- -- -- -- --    11/28/24 0915 -- 155 24 -- -- -- -- -- -- -- -- --    11/28/24 0900 -- 85 18 120/58 83 98 % -- -- -- -- -- --    11/28/24 0830 -- 84 22 122/64 87 98 % -- -- -- -- -- --    11/28/24 0800 98.1 °F (36.7 °C) 80 20 107/59 77 96 % 28 2 L/min Nasal cannula Lying 0 No Pain    11/28/24 0745 -- 78 16 96/52 67 97 % -- -- -- -- -- --    11/28/24 0730 -- 78 18 96/54 70 97 % -- -- -- -- -- --    11/28/24 0715 -- 93 19 83/53 63 95 % -- -- -- -- -- --    11/28/24 0700 98 °F (36.7 °C) 135 21 118/53 77 96 % -- -- -- -- -- --    11/28/24 0643 -- -- -- -- -- -- -- -- -- -- -- 5    11/28/24 0641 97.8 °F (36.6 °C) 135 25 100/57 79 94 % -- -- -- -- -- --    11/28/24 0616 -- 144 -- 150/89 -- -- -- -- -- -- -- --    11/28/24 0601 -- 150 -- 119/76 -- -- -- -- -- -- -- --    11/28/24 0530 -- 77 19 111/63 80 99 % -- -- -- -- -- --    11/28/24 0515 -- 76 17 114/64 82 99 % -- -- -- -- -- --    11/28/24 0500 -- 77 20 103/62 79 99 % -- -- -- -- -- --    11/28/24 0446 -- 88 -- 108/63 80 100 % -- -- -- -- -- --    11/28/24 0445 -- 82 17 124/69 92 100 % -- -- -- -- -- --    11/28/24 0436 -- 77 17 -- -- 99 % -- -- -- -- -- --    11/28/24 0430 -- 80 18 101/59 77 98 % -- -- -- -- -- --    11/28/24 0421 -- 83 -- 108/60 78 98 % -- -- -- -- -- --    11/28/24 0414 -- 100 19 108/62 79 97 % -- -- -- -- -- --    11/28/24 0408 -- 100 19 -- -- 93 % -- -- -- -- -- --    11/28/24 0407 -- 114 20 104/60 77 94 % -- -- -- -- -- --    11/28/24 0405 -- 125 19 -- -- -- -- -- -- -- -- --    11/28/24 0402 -- 132 20 116/58  82 90 % -- -- -- -- -- --    11/28/24 0358 -- 128 20 102/55 74 91 % -- -- -- -- -- --    11/28/24 0353 -- 128 20 113/56 76 92 % -- -- -- -- -- --    11/28/24 0345 -- 137 20 116/65 84 95 % -- -- -- -- -- --    11/28/24 0330 -- 129 20 115/71 88 92 % -- -- -- -- -- --    11/28/24 0328 -- 129 20 114/65 83 93 % -- -- -- -- -- --    11/28/24 0315 -- 137 20 95/64 75 91 % -- -- -- -- -- --    11/28/24 0311 -- 135 20 84/52 -- 90 % -- -- None (Room air) -- -- --    11/28/24 0304 -- 130 20 98/59 73 93 % -- -- -- -- -- --    11/28/24 0303 -- 131 20 96/62 74 92 % -- -- -- -- 3 --    11/28/24 0258 97.5 °F (36.4 °C) -- -- -- -- -- -- -- -- -- -- --    11/28/24 0240 -- -- -- -- -- 97 % -- -- -- -- -- --    11/28/24 0239 -- 140 18 94/59 -- -- -- -- None (Room air) Lying -- --           CIWA-Ar Score       Row Name 11/29/24 0700 11/29/24 0400 11/29/24 0000       CIWA-Ar    /89 -- --    Nausea and Vomiting 0 0 0    Tactile Disturbances 0 0 0    Tremor 1 0 0    Auditory Disturbances 0 0 0    Paroxysmal Sweats 0 0 0    Visual Disturbances 0 0 0    Anxiety 2 0 0    Headache, Fullness in Head 1 0 0    Agitation 2 0 0    Orientation and Clouding of Sensorium 0 0 0    CIWA-Ar Total 6 0 0      Row Name 11/28/24 2010 11/28/24 1600 11/28/24 1200       CIWA-Ar    Nausea and Vomiting 0 0 0    Tactile Disturbances 0 0 0    Tremor 0 2 0    Auditory Disturbances 0 0 0    Paroxysmal Sweats 0 0 0    Visual Disturbances 0 0 0    Anxiety 0 0 0    Headache, Fullness in Head 0 0 0    Agitation 0 1 0    Orientation and Clouding of Sensorium 0 0 0    CIWA-Ar Total 0 3 0      Row Name 11/28/24 0800 11/28/24 0303          CIWA-Ar    Pulse -- 131     Nausea and Vomiting 0 3     Tactile Disturbances 0 0     Tremor 0 0     Auditory Disturbances 0 0     Paroxysmal Sweats 0 0     Visual Disturbances 0 0     Anxiety 0 0     Headache, Fullness in Head 0 0     Agitation 0 0     Orientation and Clouding of Sensorium 0 0     CIWA-Ar Total 0 3                      Pertinent Labs/Diagnostic Test Results:   Radiology:  XR chest 1 view portable   Final Interpretation by Heidy Welch MD (11/28 1048)   Mild pulmonary venous congestion with trace left effusion.        Cardiology:  ECG 12 lead    by Interface, Ris Results In (11/29 0846)        GI:  No orders to display     Results from last 7 days   Lab Units 11/29/24  0549 11/28/24  0700 11/28/24  0342 11/25/24  0552 11/24/24  0452   WBC Thousand/uL 6.98 7.27 8.01 8.05 7.35   HEMOGLOBIN g/dL 10.4* 9.7* 10.1* 10.4* 10.3*   HEMATOCRIT % 32.7* 30.2* 30.9* 31.4* 31.9*   PLATELETS Thousands/uL 282 281 279 267 270   TOTAL NEUT ABS Thousands/µL 4.56 5.21 6.00 4.93 4.61     Results from last 7 days   Lab Units 11/29/24  0549 11/28/24  0700 11/28/24  0342 11/25/24  0552 11/24/24  0452 11/23/24  0503   SODIUM mmol/L 136 132* 132* 130* 131* 129*   POTASSIUM mmol/L 4.8 4.1 4.1 4.6 4.2 4.2   CHLORIDE mmol/L 104 102 102 97 99 95*   CO2 mmol/L 28 20* 22 27 24 27   ANION GAP mmol/L 4 10 8 6 8 7   BUN mg/dL 22 15 14 22 18 19   CREATININE mg/dL 1.31* 1.14 1.12 0.97 0.81 0.90   EGFR ml/min/1.73sq m 57 68 70 83 95 91   CALCIUM mg/dL 8.3* 7.9* 8.3* 8.4 8.7 8.1*   CALCIUM, IONIZED mmol/L  --  0.96*  --   --   --   --    MAGNESIUM mg/dL 1.7* 1.6*  --  1.7* 1.6* 1.8*   PHOSPHORUS mg/dL 3.3  --   --   --   --   --      Results from last 7 days   Lab Units 11/29/24  0549 11/28/24  0700 11/28/24 0342 11/25/24  0552 11/24/24  0452   AST U/L 19 27 30 51* 38   ALT U/L 38 45 48 74* 61*   ALK PHOS U/L 86 90 107* 123* 131*   TOTAL PROTEIN g/dL 6.3* 6.3* 6.5 6.5 6.7   ALBUMIN g/dL 3.3* 3.2* 3.3* 3.3* 3.4*   TOTAL BILIRUBIN mg/dL 0.32 0.32 0.35 0.38 0.46     Results from last 7 days   Lab Units 11/28/24  2053 11/28/24  1622 11/28/24  1146 11/28/24  0714 11/25/24  1140 11/25/24  0727 11/24/24  2112 11/24/24  1559 11/24/24  1115 11/24/24  0719 11/23/24  2023 11/23/24  1609   POC GLUCOSE mg/dl 94 144* 108 156* 116 124 163* 146* 160* 105 119 122      Results from last 7 days   Lab Units 11/29/24  0549 11/28/24  0700 11/28/24  0342 11/25/24  0552 11/24/24  0452 11/23/24  0503   GLUCOSE RANDOM mg/dL 101 110 101 104 105 111     BETA-HYDROXYBUTYRATE   Date Value Ref Range Status   01/22/2024 3.2 (H) <0.6 mmol/L Final      Results from last 7 days   Lab Units 11/28/24  0822 11/28/24  0502 11/28/24  0256   HS TNI 0HR ng/L  --   --  50*   HS TNI 2HR ng/L  --  41  --    HSTNI D2 ng/L  --  -9  --    HS TNI 4HR ng/L 43  --   --    HSTNI D4 ng/L -7  --   --      Results from last 7 days   Lab Units 11/28/24  0342   DIGOXIN LVL ng/mL 0.5*     Results from last 7 days   Lab Units 11/28/24  0256   ETHANOL LVL mg/dL 123*     Past Medical History:   Diagnosis Date    Acute on chronic kidney failure  ([facility])     Alcohol withdrawal ([facility]) 06/07/2019    Atrial fibrillation ([facility])     Cancer ([facility])     prostate ca,had radiation    Cardiac disease     stents,then triple bypass    COPD (chronic obstructive pulmonary disease) ([facility])     Coronary artery disease     ETOH abuse     Heart failure ([facility])     History of heart surgery     [facility]    Hx of heart artery stent     2014    Hyperlipidemia     Hypertension     Hyponatremia 10/17/2019    Hypovolemic shock ([facility]) 12/22/2019    Lumbar spondylitis ([facility]) 10/13/2022    Metabolic acidosis, increased anion gap 04/21/2021    Nasal bone fracture 10/10/2022    Prostate CA ([facility])     S/P CABG x 3     2004    Sleep apnea      Present on Admission:   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 disease ([facility])   Atrial fibrillation with RVR ([facility])   Alcohol abuse   Chronic hyponatremia   Closed fracture of one rib of left side   BPH (benign prostatic hyperplasia)   Ambulatory dysfunction   Chronic heart failure with preserved ejection fraction ([facility])   COPD (chronic obstructive pulmonary disease) ([facility])      Admitting Diagnosis: Chest pain [R07.9]  Atrial fibrillation with rapid ventricular response ([facility])  [I48.91]  Age/Sex: 64 y.o. male    Network Utilization Review Department  ATTENTION: Please call with any questions or concerns to [phone] and carefully listen to the prompts so that you are directed to the right person. All voicemails are confidential.   For Discharge needs, contact Care Management DC Support Team at [phone] opt. [phone]  Send all requests for admission clinical reviews, approved or denied determinations and any other requests to dedicated fax number below belonging to the campus where the patient is receiving treatment. List of dedicated fax numbers for the Facilities:  FACILITY NAME UR FAX NUMBER   ADMISSION DENIALS (Administrative/Medical Necessity) [phone]   DISCHARGE SUPPORT TEAM (NETWORK) [phone]   PARENT CHILD HEALTH (Maternity/NICU/Pediatrics) [phone]   [facility] [phone]   [facility] [phone]   [facility] [phone]   [facility] [phone]   [facility] [phone]   [facility] [phone]   [facility] [phone]   [facility] [phone]   [facility] [phone]   [facility] [phone]   [facility] [phone]   [facility] [phone]

## 2024-11-29 NOTE — DISCHARGE INSTR - AVS FIRST PAGE
Continue Digoxin 0.125mg daily (new medication)   Continue Lopressor 200mg twice a day.   Start taking salt tablet 2g twice a day   Start taking amlodipine 5 mg daily   Please take magnesium oxide 800 mg BID  Repeat BMP in 1 week.   Follow up outpatient with Cardiology and [facility].

## 2024-11-30 ENCOUNTER — RA CDI HCC (OUTPATIENT)
Dept: OTHER | Facility: HOSPITAL | Age: 64
End: 2024-11-30

## 2024-11-30 LAB — MRSA NOSE QL CULT: NORMAL

## 2024-12-07 ENCOUNTER — APPOINTMENT (EMERGENCY)
Dept: RADIOLOGY | Facility: HOSPITAL | Age: 64
DRG: 308 | End: 2024-12-07
Payer: COMMERCIAL

## 2024-12-07 ENCOUNTER — HOSPITAL ENCOUNTER (INPATIENT)
Facility: HOSPITAL | Age: 64
LOS: 1 days | Discharge: LEFT BEFORE DISCHARGE | DRG: 308 | End: 2024-12-09
Attending: EMERGENCY MEDICINE | Admitting: INTERNAL MEDICINE
Payer: COMMERCIAL

## 2024-12-07 DIAGNOSIS — R09.02 HYPOXIA: ICD-10-CM

## 2024-12-07 DIAGNOSIS — F10.929 ALCOHOL INTOXICATION (HCC): ICD-10-CM

## 2024-12-07 DIAGNOSIS — T17.500A MUCUS PLUGGING OF BRONCHI: ICD-10-CM

## 2024-12-07 DIAGNOSIS — J96.01 ACUTE RESPIRATORY FAILURE WITH HYPOXIA (HCC): ICD-10-CM

## 2024-12-07 DIAGNOSIS — I48.91 ATRIAL FIBRILLATION WITH RAPID VENTRICULAR RESPONSE (HCC): Primary | ICD-10-CM

## 2024-12-07 LAB
2HR DELTA HS TROPONIN: -9 NG/L
ALBUMIN SERPL BCG-MCNC: 3.6 G/DL (ref 3.5–5)
ALP SERPL-CCNC: 108 U/L (ref 34–104)
ALT SERPL W P-5'-P-CCNC: 31 U/L (ref 7–52)
AMPHETAMINES SERPL QL SCN: NEGATIVE
ANION GAP SERPL CALCULATED.3IONS-SCNC: 14 MMOL/L (ref 4–13)
APTT PPP: 29 SECONDS (ref 23–34)
AST SERPL W P-5'-P-CCNC: 46 U/L (ref 13–39)
BACTERIA UR QL AUTO: ABNORMAL /HPF
BARBITURATES UR QL: POSITIVE
BASOPHILS # BLD AUTO: 0.16 THOUSANDS/ÂΜL (ref 0–0.1)
BASOPHILS NFR BLD AUTO: 3 % (ref 0–1)
BENZODIAZ UR QL: POSITIVE
BILIRUB SERPL-MCNC: 0.51 MG/DL (ref 0.2–1)
BILIRUB UR QL STRIP: NEGATIVE
BNP SERPL-MCNC: 131 PG/ML (ref 0–100)
BUN SERPL-MCNC: 16 MG/DL (ref 5–25)
CALCIUM SERPL-MCNC: 8.3 MG/DL (ref 8.4–10.2)
CARDIAC TROPONIN I PNL SERPL HS: 107 NG/L (ref ?–50)
CARDIAC TROPONIN I PNL SERPL HS: 116 NG/L (ref ?–50)
CHLORIDE SERPL-SCNC: 105 MMOL/L (ref 96–108)
CLARITY UR: CLEAR
CO2 SERPL-SCNC: 25 MMOL/L (ref 21–32)
COCAINE UR QL: NEGATIVE
COLOR UR: YELLOW
CREAT SERPL-MCNC: 1.02 MG/DL (ref 0.6–1.3)
D DIMER PPP FEU-MCNC: 2.16 UG/ML FEU
DIGOXIN SERPL-MCNC: <0.3 NG/ML (ref 0.8–2)
EOSINOPHIL # BLD AUTO: 0.13 THOUSAND/ÂΜL (ref 0–0.61)
EOSINOPHIL NFR BLD AUTO: 2 % (ref 0–6)
ERYTHROCYTE [DISTWIDTH] IN BLOOD BY AUTOMATED COUNT: 16.4 % (ref 11.6–15.1)
ETHANOL SERPL-MCNC: 428 MG/DL
FENTANYL UR QL SCN: NEGATIVE
FLUAV AG UPPER RESP QL IA.RAPID: NEGATIVE
FLUBV AG UPPER RESP QL IA.RAPID: NEGATIVE
GFR SERPL CREATININE-BSD FRML MDRD: 78 ML/MIN/1.73SQ M
GLUCOSE SERPL-MCNC: 108 MG/DL (ref 65–140)
GLUCOSE SERPL-MCNC: 77 MG/DL (ref 65–140)
GLUCOSE UR STRIP-MCNC: NEGATIVE MG/DL
GRAN CASTS #/AREA URNS LPF: ABNORMAL /[LPF]
HCT VFR BLD AUTO: 35.4 % (ref 36.5–49.3)
HGB BLD-MCNC: 11.6 G/DL (ref 12–17)
HGB UR QL STRIP.AUTO: NEGATIVE
HYALINE CASTS #/AREA URNS LPF: ABNORMAL /LPF
HYDROCODONE UR QL SCN: NEGATIVE
IMM GRANULOCYTES # BLD AUTO: 0.02 THOUSAND/UL (ref 0–0.2)
IMM GRANULOCYTES NFR BLD AUTO: 0 % (ref 0–2)
INR PPP: 1.07 (ref 0.85–1.19)
KETONES UR STRIP-MCNC: NEGATIVE MG/DL
LEUKOCYTE ESTERASE UR QL STRIP: NEGATIVE
LIPASE SERPL-CCNC: 87 U/L (ref 11–82)
LYMPHOCYTES # BLD AUTO: 1.79 THOUSANDS/ÂΜL (ref 0.6–4.47)
LYMPHOCYTES NFR BLD AUTO: 31 % (ref 14–44)
MAGNESIUM SERPL-MCNC: 1.6 MG/DL (ref 1.9–2.7)
MCH RBC QN AUTO: 32.9 PG (ref 26.8–34.3)
MCHC RBC AUTO-ENTMCNC: 32.8 G/DL (ref 31.4–37.4)
MCV RBC AUTO: 100 FL (ref 82–98)
METHADONE UR QL: NEGATIVE
MONOCYTES # BLD AUTO: 0.4 THOUSAND/ÂΜL (ref 0.17–1.22)
MONOCYTES NFR BLD AUTO: 7 % (ref 4–12)
NEUTROPHILS # BLD AUTO: 3.24 THOUSANDS/ÂΜL (ref 1.85–7.62)
NEUTS SEG NFR BLD AUTO: 57 % (ref 43–75)
NITRITE UR QL STRIP: NEGATIVE
NON-SQ EPI CELLS URNS QL MICRO: ABNORMAL /HPF
NRBC BLD AUTO-RTO: 0 /100 WBCS
OPIATES UR QL SCN: NEGATIVE
OXYCODONE+OXYMORPHONE UR QL SCN: NEGATIVE
PCP UR QL: NEGATIVE
PH UR STRIP.AUTO: 6 [PH]
PLATELET # BLD AUTO: 192 THOUSANDS/UL (ref 149–390)
PMV BLD AUTO: 9.5 FL (ref 8.9–12.7)
POTASSIUM SERPL-SCNC: 4.6 MMOL/L (ref 3.5–5.3)
PROT SERPL-MCNC: 7 G/DL (ref 6.4–8.4)
PROT UR STRIP-MCNC: ABNORMAL MG/DL
PROTHROMBIN TIME: 14.4 SECONDS (ref 12.3–15)
RBC # BLD AUTO: 3.53 MILLION/UL (ref 3.88–5.62)
RBC #/AREA URNS AUTO: ABNORMAL /HPF
SARS-COV+SARS-COV-2 AG RESP QL IA.RAPID: NEGATIVE
SODIUM SERPL-SCNC: 144 MMOL/L (ref 135–147)
SP GR UR STRIP.AUTO: 1.02 (ref 1–1.03)
THC UR QL: NEGATIVE
UROBILINOGEN UR STRIP-ACNC: <2 MG/DL
WBC # BLD AUTO: 5.74 THOUSAND/UL (ref 4.31–10.16)
WBC #/AREA URNS AUTO: ABNORMAL /HPF

## 2024-12-07 PROCEDURE — 80053 COMPREHEN METABOLIC PANEL: CPT | Performed by: EMERGENCY MEDICINE

## 2024-12-07 PROCEDURE — 96367 TX/PROPH/DG ADDL SEQ IV INF: CPT

## 2024-12-07 PROCEDURE — 87811 SARS-COV-2 COVID19 W/OPTIC: CPT | Performed by: EMERGENCY MEDICINE

## 2024-12-07 PROCEDURE — 80307 DRUG TEST PRSMV CHEM ANLYZR: CPT

## 2024-12-07 PROCEDURE — 82077 ASSAY SPEC XCP UR&BREATH IA: CPT | Performed by: EMERGENCY MEDICINE

## 2024-12-07 PROCEDURE — 96374 THER/PROPH/DIAG INJ IV PUSH: CPT

## 2024-12-07 PROCEDURE — 84484 ASSAY OF TROPONIN QUANT: CPT | Performed by: EMERGENCY MEDICINE

## 2024-12-07 PROCEDURE — 85730 THROMBOPLASTIN TIME PARTIAL: CPT | Performed by: EMERGENCY MEDICINE

## 2024-12-07 PROCEDURE — 80162 ASSAY OF DIGOXIN TOTAL: CPT | Performed by: EMERGENCY MEDICINE

## 2024-12-07 PROCEDURE — 99291 CRITICAL CARE FIRST HOUR: CPT | Performed by: EMERGENCY MEDICINE

## 2024-12-07 PROCEDURE — 85379 FIBRIN DEGRADATION QUANT: CPT | Performed by: EMERGENCY MEDICINE

## 2024-12-07 PROCEDURE — 82948 REAGENT STRIP/BLOOD GLUCOSE: CPT

## 2024-12-07 PROCEDURE — 81001 URINALYSIS AUTO W/SCOPE: CPT

## 2024-12-07 PROCEDURE — 83735 ASSAY OF MAGNESIUM: CPT | Performed by: EMERGENCY MEDICINE

## 2024-12-07 PROCEDURE — 83880 ASSAY OF NATRIURETIC PEPTIDE: CPT | Performed by: EMERGENCY MEDICINE

## 2024-12-07 PROCEDURE — 71275 CT ANGIOGRAPHY CHEST: CPT

## 2024-12-07 PROCEDURE — 87804 INFLUENZA ASSAY W/OPTIC: CPT | Performed by: EMERGENCY MEDICINE

## 2024-12-07 PROCEDURE — 96375 TX/PRO/DX INJ NEW DRUG ADDON: CPT

## 2024-12-07 PROCEDURE — 94760 N-INVAS EAR/PLS OXIMETRY 1: CPT

## 2024-12-07 PROCEDURE — 36415 COLL VENOUS BLD VENIPUNCTURE: CPT | Performed by: EMERGENCY MEDICINE

## 2024-12-07 PROCEDURE — 96361 HYDRATE IV INFUSION ADD-ON: CPT

## 2024-12-07 PROCEDURE — 96366 THER/PROPH/DIAG IV INF ADDON: CPT

## 2024-12-07 PROCEDURE — 99223 1ST HOSP IP/OBS HIGH 75: CPT | Performed by: INTERNAL MEDICINE

## 2024-12-07 PROCEDURE — 99285 EMERGENCY DEPT VISIT HI MDM: CPT

## 2024-12-07 PROCEDURE — 71045 X-RAY EXAM CHEST 1 VIEW: CPT

## 2024-12-07 PROCEDURE — 96365 THER/PROPH/DIAG IV INF INIT: CPT

## 2024-12-07 PROCEDURE — 85610 PROTHROMBIN TIME: CPT | Performed by: EMERGENCY MEDICINE

## 2024-12-07 PROCEDURE — 93005 ELECTROCARDIOGRAM TRACING: CPT

## 2024-12-07 PROCEDURE — 85025 COMPLETE CBC W/AUTO DIFF WBC: CPT | Performed by: EMERGENCY MEDICINE

## 2024-12-07 PROCEDURE — 83690 ASSAY OF LIPASE: CPT | Performed by: EMERGENCY MEDICINE

## 2024-12-07 RX ORDER — GUAIFENESIN 100 MG/5ML
200 SOLUTION ORAL EVERY 4 HOURS PRN
Status: DISCONTINUED | OUTPATIENT
Start: 2024-12-07 | End: 2024-12-09 | Stop reason: HOSPADM

## 2024-12-07 RX ORDER — METOPROLOL TARTRATE 100 MG/1
200 TABLET ORAL EVERY 12 HOURS SCHEDULED
Status: DISCONTINUED | OUTPATIENT
Start: 2024-12-07 | End: 2024-12-09 | Stop reason: HOSPADM

## 2024-12-07 RX ORDER — LANOLIN ALCOHOL/MO/W.PET/CERES
100 CREAM (GRAM) TOPICAL DAILY
Status: DISCONTINUED | OUTPATIENT
Start: 2024-12-08 | End: 2024-12-09 | Stop reason: HOSPADM

## 2024-12-07 RX ORDER — DIGOXIN 0.25 MG/ML
250 INJECTION INTRAMUSCULAR; INTRAVENOUS ONCE
Status: COMPLETED | OUTPATIENT
Start: 2024-12-07 | End: 2024-12-07

## 2024-12-07 RX ORDER — FERROUS SULFATE 325(65) MG
325 TABLET ORAL
Status: DISCONTINUED | OUTPATIENT
Start: 2024-12-08 | End: 2024-12-09 | Stop reason: HOSPADM

## 2024-12-07 RX ORDER — DILTIAZEM HYDROCHLORIDE 5 MG/ML
15 INJECTION INTRAVENOUS ONCE
Status: COMPLETED | OUTPATIENT
Start: 2024-12-07 | End: 2024-12-07

## 2024-12-07 RX ORDER — AMOXICILLIN 250 MG
1 CAPSULE ORAL DAILY PRN
Status: DISCONTINUED | OUTPATIENT
Start: 2024-12-07 | End: 2024-12-09 | Stop reason: HOSPADM

## 2024-12-07 RX ORDER — GABAPENTIN 300 MG/1
300 CAPSULE ORAL 3 TIMES DAILY
Status: DISCONTINUED | OUTPATIENT
Start: 2024-12-07 | End: 2024-12-09 | Stop reason: HOSPADM

## 2024-12-07 RX ORDER — NICOTINE 21 MG/24HR
1 PATCH, TRANSDERMAL 24 HOURS TRANSDERMAL DAILY
Status: DISCONTINUED | OUTPATIENT
Start: 2024-12-08 | End: 2024-12-09 | Stop reason: HOSPADM

## 2024-12-07 RX ORDER — DIAZEPAM 10 MG/2ML
5 INJECTION, SOLUTION INTRAMUSCULAR; INTRAVENOUS ONCE
Status: COMPLETED | OUTPATIENT
Start: 2024-12-07 | End: 2024-12-07

## 2024-12-07 RX ORDER — FLUTICASONE FUROATE AND VILANTEROL 200; 25 UG/1; UG/1
1 POWDER RESPIRATORY (INHALATION) DAILY
Status: DISCONTINUED | OUTPATIENT
Start: 2024-12-08 | End: 2024-12-09 | Stop reason: HOSPADM

## 2024-12-07 RX ORDER — DILTIAZEM HYDROCHLORIDE 5 MG/ML
20 INJECTION INTRAVENOUS ONCE
Status: COMPLETED | OUTPATIENT
Start: 2024-12-07 | End: 2024-12-07

## 2024-12-07 RX ORDER — INSULIN LISPRO 100 [IU]/ML
1-5 INJECTION, SOLUTION INTRAVENOUS; SUBCUTANEOUS
Status: DISCONTINUED | OUTPATIENT
Start: 2024-12-07 | End: 2024-12-09 | Stop reason: HOSPADM

## 2024-12-07 RX ORDER — ASPIRIN 81 MG/1
81 TABLET, CHEWABLE ORAL DAILY
Status: DISCONTINUED | OUTPATIENT
Start: 2024-12-08 | End: 2024-12-09 | Stop reason: HOSPADM

## 2024-12-07 RX ORDER — TAMSULOSIN HYDROCHLORIDE 0.4 MG/1
0.4 CAPSULE ORAL DAILY
Status: DISCONTINUED | OUTPATIENT
Start: 2024-12-08 | End: 2024-12-09 | Stop reason: HOSPADM

## 2024-12-07 RX ORDER — SODIUM CHLORIDE, SODIUM LACTATE, POTASSIUM CHLORIDE, CALCIUM CHLORIDE 600; 310; 30; 20 MG/100ML; MG/100ML; MG/100ML; MG/100ML
100 INJECTION, SOLUTION INTRAVENOUS CONTINUOUS
Status: DISCONTINUED | OUTPATIENT
Start: 2024-12-07 | End: 2024-12-08

## 2024-12-07 RX ORDER — RANOLAZINE 500 MG/1
500 TABLET, EXTENDED RELEASE ORAL EVERY 12 HOURS
Status: DISCONTINUED | OUTPATIENT
Start: 2024-12-07 | End: 2024-12-09 | Stop reason: HOSPADM

## 2024-12-07 RX ORDER — SODIUM CHLORIDE, SODIUM LACTATE, POTASSIUM CHLORIDE, CALCIUM CHLORIDE 600; 310; 30; 20 MG/100ML; MG/100ML; MG/100ML; MG/100ML
150 INJECTION, SOLUTION INTRAVENOUS CONTINUOUS
Status: DISCONTINUED | OUTPATIENT
Start: 2024-12-07 | End: 2024-12-07

## 2024-12-07 RX ORDER — FOLIC ACID 1 MG/1
1000 TABLET ORAL DAILY
Status: DISCONTINUED | OUTPATIENT
Start: 2024-12-08 | End: 2024-12-09 | Stop reason: HOSPADM

## 2024-12-07 RX ORDER — INSULIN LISPRO 100 [IU]/ML
1-6 INJECTION, SOLUTION INTRAVENOUS; SUBCUTANEOUS
Status: DISCONTINUED | OUTPATIENT
Start: 2024-12-08 | End: 2024-12-07

## 2024-12-07 RX ORDER — MAGNESIUM SULFATE HEPTAHYDRATE 40 MG/ML
2 INJECTION, SOLUTION INTRAVENOUS ONCE
Status: COMPLETED | OUTPATIENT
Start: 2024-12-07 | End: 2024-12-07

## 2024-12-07 RX ORDER — GUAIFENESIN 600 MG/1
600 TABLET, EXTENDED RELEASE ORAL EVERY 12 HOURS SCHEDULED
Status: DISCONTINUED | OUTPATIENT
Start: 2024-12-07 | End: 2024-12-09 | Stop reason: HOSPADM

## 2024-12-07 RX ORDER — PANTOPRAZOLE SODIUM 40 MG/1
40 TABLET, DELAYED RELEASE ORAL
Status: DISCONTINUED | OUTPATIENT
Start: 2024-12-08 | End: 2024-12-09 | Stop reason: HOSPADM

## 2024-12-07 RX ORDER — SODIUM CHLORIDE FOR INHALATION 3 %
4 VIAL, NEBULIZER (ML) INHALATION EVERY 8 HOURS
Status: DISCONTINUED | OUTPATIENT
Start: 2024-12-07 | End: 2024-12-08

## 2024-12-07 RX ADMIN — DIAZEPAM 5 MG: 5 INJECTION INTRAMUSCULAR; INTRAVENOUS at 16:20

## 2024-12-07 RX ADMIN — DILTIAZEM HYDROCHLORIDE 20 MG: 5 INJECTION, SOLUTION INTRAVENOUS at 15:25

## 2024-12-07 RX ADMIN — APIXABAN 5 MG: 5 TABLET, FILM COATED ORAL at 20:49

## 2024-12-07 RX ADMIN — DIGOXIN 250 MCG: 0.25 INJECTION INTRAMUSCULAR; INTRAVENOUS at 18:16

## 2024-12-07 RX ADMIN — DILTIAZEM HYDROCHLORIDE 15 MG/HR: 5 INJECTION, SOLUTION INTRAVENOUS at 21:34

## 2024-12-07 RX ADMIN — RANOLAZINE 500 MG: 500 TABLET, FILM COATED, EXTENDED RELEASE ORAL at 20:49

## 2024-12-07 RX ADMIN — DILTIAZEM HYDROCHLORIDE 20 MG: 5 INJECTION, SOLUTION INTRAVENOUS at 18:05

## 2024-12-07 RX ADMIN — DILTIAZEM HYDROCHLORIDE 15 MG: 5 INJECTION, SOLUTION INTRAVENOUS at 15:55

## 2024-12-07 RX ADMIN — DILTIAZEM HYDROCHLORIDE 5 MG/HR: 5 INJECTION, SOLUTION INTRAVENOUS at 14:36

## 2024-12-07 RX ADMIN — DIAZEPAM 5 MG: 5 INJECTION INTRAMUSCULAR; INTRAVENOUS at 17:12

## 2024-12-07 RX ADMIN — SODIUM CHLORIDE, SODIUM LACTATE, POTASSIUM CHLORIDE, AND CALCIUM CHLORIDE 100 ML/HR: .6; .31; .03; .02 INJECTION, SOLUTION INTRAVENOUS at 21:31

## 2024-12-07 RX ADMIN — SODIUM CHLORIDE, SODIUM LACTATE, POTASSIUM CHLORIDE, AND CALCIUM CHLORIDE 150 ML/HR: .6; .31; .03; .02 INJECTION, SOLUTION INTRAVENOUS at 18:07

## 2024-12-07 RX ADMIN — IOHEXOL 85 ML: 350 INJECTION, SOLUTION INTRAVENOUS at 15:07

## 2024-12-07 RX ADMIN — METOPROLOL TARTRATE 200 MG: 100 TABLET, FILM COATED ORAL at 20:49

## 2024-12-07 RX ADMIN — SODIUM CHLORIDE 1000 ML: 0.9 INJECTION, SOLUTION INTRAVENOUS at 13:16

## 2024-12-07 RX ADMIN — SODIUM CHLORIDE 1000 ML: 0.9 INJECTION, SOLUTION INTRAVENOUS at 15:50

## 2024-12-07 RX ADMIN — GUAIFENESIN 600 MG: 600 TABLET, EXTENDED RELEASE ORAL at 20:49

## 2024-12-07 RX ADMIN — GABAPENTIN 300 MG: 300 CAPSULE ORAL at 20:49

## 2024-12-07 RX ADMIN — MAGNESIUM SULFATE HEPTAHYDRATE 2 G: 40 INJECTION, SOLUTION INTRAVENOUS at 14:24

## 2024-12-07 NOTE — ED PROVIDER NOTES
Time reflects when diagnosis was documented in both MDM as applicable and the Disposition within this note       Time User Action Codes Description Comment    12/7/2024  2:04 PM Quinn Krishna Add [I48.91] Atrial fibrillation with rapid ventricular response (HCC)     12/7/2024  2:04 PM Quinn Krishna Add [F10.929] Alcohol intoxication (HCC)     12/7/2024  2:04 PM Quinn Krishna Add [R09.02] Hypoxia     12/8/2024 12:32 PM Pablito Presley Add [J96.01] Acute respiratory failure with hypoxia (HCC)     12/8/2024 12:32 PM Pablito Presley Add [T17.500A] Mucus plugging of bronchi           ED Disposition       ED Disposition   Admit    Condition   Stable    Date/Time   Sat Dec 7, 2024  5:46 PM    Comment   Case was discussed with Dr. Hardwick and the patient's admission status was agreed to be Admission Status: inpatient status to the service of Dr. Hardwick.               Assessment & Plan       Medical Decision Making  Pulse ox 96% on room air indicating adequate oxygenation  CXR: NAD as read by me    Differential diagnosis include but not limited to arrhythmia, ACS, alcohol intoxication, dehydration, pneumonia      Case discussed with hospitalist on-call Dr. Thompson for admission to the family medicine service.    Amount and/or Complexity of Data Reviewed  Labs: ordered.  Radiology: ordered and independent interpretation performed.  ECG/medicine tests: ordered and independent interpretation performed.    Risk  Prescription drug management.  Decision regarding hospitalization.             Medications   diltiazem (CARDIZEM) 125 mg in sodium chloride 0.9 % 125 mL infusion (15 mg/hr Intravenous Rate/Dose Change 12/7/24 1616)   sodium chloride 0.9 % bolus 1,000 mL (0 mL Intravenous Stopped 12/7/24 1416)   magnesium sulfate 2 g/50 mL IVPB (premix) 2 g (0 g Intravenous Stopped 12/7/24 1524)   iohexol (OMNIPAQUE) 350 MG/ML injection (MULTI-DOSE) 85 mL (85 mL Intravenous Given 12/7/24 1507)   diltiazem (CARDIZEM) injection 20 mg (20  mg Intravenous Given 12/7/24 1525)   diltiazem (CARDIZEM) injection 15 mg (15 mg Intravenous Given 12/7/24 1555)   sodium chloride 0.9 % bolus 1,000 mL (0 mL Intravenous Stopped 12/7/24 1801)   diazepam (VALIUM) injection 5 mg (5 mg Intravenous Given 12/7/24 1620)   diazepam (VALIUM) injection 5 mg (5 mg Intravenous Given 12/7/24 1712)   diltiazem (CARDIZEM) injection 20 mg (20 mg Intravenous Given 12/7/24 1805)   digoxin (LANOXIN) injection 250 mcg (250 mcg Intravenous Given 12/7/24 1816)       ED Risk Strat Scores                CIWA-Ar Score       Row Name 12/08/24 14:32:10 12/08/24 10:14:16 12/08/24 08:57:19       CIWA-Ar    Nausea and Vomiting 0 0 0    Tactile Disturbances 0 0 0    Tremor 2 2 4    Auditory Disturbances 0 0 0    Paroxysmal Sweats 0 0 1    Visual Disturbances 0 0 0    Anxiety 0 0 0    Headache, Fullness in Head 2 1 2    Agitation 1 0 1    Orientation and Clouding of Sensorium 0 0 0    CIWA-Ar Total 5 3 8      Row Name 12/08/24 0800 12/08/24 0700 12/08/24 0300       CIWA-Ar    Blood Pressure 133/78 134/76 128/74    Pulse 71 72 71    Nausea and Vomiting 0 0 0    Tactile Disturbances 0 0 0    Tremor 4 2 2    Auditory Disturbances 0 0 0    Paroxysmal Sweats 1 0 0    Visual Disturbances 0 0 0    Anxiety 0 0 0    Headache, Fullness in Head 2 1 1    Agitation 1 0 0    Orientation and Clouding of Sensorium 0 0 0    CIWA-Ar Total 8 3 3      Row Name 12/07/24 2300 12/07/24 2100 12/07/24 1600       CIWA-Ar    Nausea and Vomiting 0 0 0    Tactile Disturbances 0 0 0    Tremor 2 2 2    Auditory Disturbances 0 0 0    Paroxysmal Sweats 0 0 0    Visual Disturbances 0 0 0    Anxiety 0 0 0    Headache, Fullness in Head 1 1 0    Agitation 0 0 1    Orientation and Clouding of Sensorium 0 0 0    CIWA-Ar Total 3 3 3                            SBIRT 20yo+      Flowsheet Row Most Recent Value   Initial Alcohol Screen: US AUDIT-C     1. How often do you have a drink containing alcohol? 6 Filed at: 12/07/2024 1250   2. How  many drinks containing alcohol do you have on a typical day you are drinking?  5 Filed at: 12/07/2024 1251   3b. FEMALE Any Age, or MALE 65+: How often do you have 4 or more drinks on one occassion? 6 Filed at: 12/07/2024 1256   Audit-C Score 17 Filed at: 12/07/2024 1256                            History of Present Illness       Chief Complaint   Patient presents with    Atrial Fibrillation     Patient arrives with medics. Patient called for chest pain. Found patient in Atrial flutter. 324 of ASA given, and 20 mg and 25 mg cardizem given pta.        Past Medical History:   Diagnosis Date    Acute on chronic kidney failure  (HCC)     Alcohol withdrawal (HCC) 06/07/2019    Atrial fibrillation (HCC)     Cancer (HCC)     prostate ca,had radiation    Cardiac disease     stents,then triple bypass    COPD (chronic obstructive pulmonary disease) (HCC)     Coronary artery disease     ETOH abuse     Heart failure (HCC)     History of heart surgery     says triple bypass [facility]    Hx of heart artery stent     2014    Hyperlipidemia     Hypertension     Hyponatremia 10/17/2019    Hypovolemic shock ([facility]) 12/22/2019    Lumbar spondylitis ([facility]) 10/13/2022    Metabolic acidosis, increased anion gap 04/21/2021    Nasal bone fracture 10/10/2022    Prostate CA ([facility])     S/P CABG x 3     2004    Sleep apnea       Past Surgical History:   Procedure Laterality Date    CARDIAC CATHETERIZATION      2 stents    CORONARY ARTERY BYPASS GRAFT  2004    IL ARTHRD ANT INTERBODY MIN DSC CRV BELOW C2 N/A 12/16/2020    Procedure: Anterior cervical discectomy with fusion C4-C7; Posterior cervical decompression and fusion C2-T2;  Surgeon: David Rowell MD;  Location: [facility];  Service: Neurosurgery    TONSILLECTOMY        Family History   Problem Relation Age of Onset    Diabetes Mother     Uterine cancer Mother     COPD Father     Hypertension Father       Social History     Tobacco Use    Smoking status: Every Day     Current packs/day:  1.50     Average packs/day: 1.5 packs/day for 40.0 years (60.0 ttl pk-yrs)     Types: Cigarettes    Smokeless tobacco: Never   Vaping Use    Vaping status: Never Used   Substance Use Topics    Alcohol use: Yes     Alcohol/week: 4.0 standard drinks of alcohol     Types: 4 Standard drinks or equivalent per week     Comment: quart of vodka daily    Drug use: No      E-Cigarette/Vaping    E-Cigarette Use Never User       E-Cigarette/Vaping Substances    Nicotine Yes     THC No     CBD No     Flavoring No     Other No     Unknown No       I have reviewed and agree with the history as documented.     Patient brought in by EMS from home for evaluation of chest pain.  Patient was giving aspirin and Cardizem boluses secondary to the A-fib with RVR prehospital he.  Patient reports his chest pain is improved.  Does admit to drinking and has a known alcoholic.  No reported fall or trauma.  Patient did not take his Eliquis today questionable compliance with the rest of his medications as well.      History provided by:  Patient and EMS personnel   used: No    Atrial Fibrillation  Associated symptoms: chest pain        Review of Systems   Cardiovascular:  Positive for chest pain.   All other systems reviewed and are negative.          Objective       ED Triage Vitals   Temperature Pulse Blood Pressure Respirations SpO2 Patient Position - Orthostatic VS   12/07/24 1300 12/07/24 1254 12/07/24 1254 12/07/24 1254 12/07/24 1254 12/07/24 1409   97.9 °F (36.6 °C) 87 107/62 15 (!) 88 % Sitting      Temp Source Heart Rate Source BP Location FiO2 (%) Pain Score    12/07/24 1300 12/07/24 1254 12/07/24 1409 -- 12/07/24 1430    Tympanic Monitor Left arm  No Pain      Vitals      Date and Time Temp Pulse SpO2 Resp BP Pain Score FACES Pain Rating User   12/08/24 1432 -- 71 96 % -- 137/84 -- -- DII   12/08/24 1315 -- -- 98 % -- -- -- -- DIMITRI   12/08/24 1305 -- -- 89 % -- -- -- -- DIMITRI   12/08/24 1242 -- -- -- 20 -- No Pain --     12/08/24 1242 -- 71 92 % -- 136/79 -- -- DII   12/08/24 1135 -- -- 87 % -- -- -- -- DIMITRI   12/08/24 1014 -- 75 88 % -- 139/86 -- -- DII   12/08/24 0927 -- -- 99 % -- -- -- -- DIMITRI   12/08/24 0917 -- -- 91 % -- -- -- -- DIMITRI   12/08/24 0857 -- 93 95 % -- 158/80 -- -- DII   12/08/24 0800 -- 71 -- -- 133/78 -- --    12/08/24 0732 -- -- -- 22 -- No Pain --    12/08/24 0732 98.5 °F (36.9 °C) 71 91 % -- 133/75 -- -- DII   12/08/24 0700 -- 72 -- -- 134/76 -- --    12/08/24 0400 -- 71 86 % -- 128/74 -- --    12/08/24 0300 -- 71 -- -- 128/74 -- --    12/08/24 0253 -- 71 83 % -- 128/74 -- -- [facility]   12/08/24 0215 -- 72 90 % -- 111/62 -- --    12/08/24 0100 -- 71 85 % -- 102/57 -- --    12/08/24 0000 -- 71 88 % -- 102/56 -- --    12/07/24 2300 -- 102 91 % -- 130/76 No Pain --    12/07/24 2241 97.8 °F (36.6 °C) 129 88 % 18 133/77 -- -- DII   12/07/24 2200 -- 141 90 % -- 150/90 -- --    12/07/24 2100 -- 153 89 % -- 158/96 -- --    12/07/24 2050 -- -- 90 % -- -- -- --    12/07/24 1900 -- 151 95 % -- -- -- --    12/07/24 1842 97.6 °F (36.4 °C) 147 95 % 19 165/99 -- -- DII   12/07/24 1817 -- 150 93 % 18 145/81 -- -- AG   12/07/24 1815 -- 150 94 % 18 -- -- -- AG   12/07/24 1804 -- 150 91 % 18 153/91 -- -- AG   12/07/24 1745 -- 150 93 % 15 159/96 -- -- SF   12/07/24 1730 -- 150 -- 14 156/97 -- -- SF   12/07/24 1715 -- 144 97 % 20 153/93 -- -- SF   12/07/24 1700 -- 148 98 % 16 153/92 -- -- SF   12/07/24 1645 -- 142 96 % 20 154/81 -- -- SF   12/07/24 1630 -- 140 96 % 19 133/73 -- -- SF   12/07/24 1616 -- -- -- -- 137/86 -- -- AM   12/07/24 1615 -- 150 98 % 10 137/86 -- -- SF   12/07/24 1600 -- 150 98 % 17 127/77 -- -- AM   12/07/24 1545 -- 148 95 % 17 128/76 -- -- SF 12/07/24 1530 -- 146 96 % 12 124/78 -- -- SF   12/07/24 1430 -- 150 95 % 18 146/79 No Pain -- SF   12/07/24 1415 -- 150 96 % 12 133/88 -- -- AM   12/07/24 1409 -- 139 97 % 12 136/82 -- -- AM   12/07/24 1300 97.9 °F (36.6 °C) 101 94 % 15 102/59  -- -- MARICRUZ   12/07/24 1257 -- 75 94 % 17 -- -- -- AM   12/07/24 1254 -- 87 88 % 15 107/62 -- -- AM            Physical Exam  Vitals and nursing note reviewed.   Constitutional:       General: He is in acute distress.      Appearance: He is ill-appearing.   HENT:      Head: Atraumatic.   Eyes:      Extraocular Movements: Extraocular movements intact.      Conjunctiva/sclera: Conjunctivae normal.      Pupils: Pupils are equal, round, and reactive to light.   Cardiovascular:      Rate and Rhythm: Tachycardia present. Rhythm irregular.   Pulmonary:      Effort: Pulmonary effort is normal. No respiratory distress.      Breath sounds: Normal breath sounds.   Abdominal:      Tenderness: There is no abdominal tenderness.   Neurological:      General: No focal deficit present.      Mental Status: He is alert and oriented to person, place, and time.         Results Reviewed       Procedure Component Value Units Date/Time    Rapid drug screen, urine [[id number]]  (Abnormal) Collected: 12/07/24 1846    Lab Status: Final result Specimen: Urine, Other Updated: 12/07/24 2014     Amph/Meth UR Negative     Barbiturate Ur Positive     Benzodiazepine Urine Positive     Cocaine Urine Negative     Methadone Urine Negative     Opiate Urine Negative     PCP Ur Negative     THC Urine Negative     Oxycodone Urine Negative     Fentanyl Urine Negative     HYDROCODONE URINE Negative    Narrative:      Presumptive report. If requested, specimen will be sent to reference lab for confirmation.  FOR MEDICAL PURPOSES ONLY.   IF CONFIRMATION NEEDED PLEASE CONTACT THE LAB WITHIN 5 DAYS.    Drug Screen Cutoff Levels:  AMPHETAMINE/METHAMPHETAMINES  1000 ng/mL  BARBITURATES     200 ng/mL  BENZODIAZEPINES     200 ng/mL  COCAINE      300 ng/mL  METHADONE      300 ng/mL  OPIATES      300 ng/mL  PHENCYCLIDINE     25 ng/mL  THC       50 ng/mL  OXYCODONE      100 ng/mL  FENTANYL      5 ng/mL  HYDROCODONE     300 ng/mL    HS Troponin I 2hr [[id number]]  (Abnormal)  Collected: 12/07/24 1813    Lab Status: Final result Specimen: Blood from Hand, Left Updated: 12/07/24 1914     hs TnI 2hr 107 ng/L      Delta 2hr hsTnI -9 ng/L     UA (URINE) with reflex to Scope [[id number]]  (Abnormal) Collected: 12/07/24 1846    Lab Status: Final result Specimen: Urine, Other Updated: 12/07/24 1913     Color, UA Yellow     Clarity, UA Clear     Specific Gravity, UA 1.020     pH, UA 6.0     Leukocytes, UA Negative     Nitrite, UA Negative     Protein, UA 30 (1+) mg/dl      Glucose, UA Negative mg/dl      Ketones, UA Negative mg/dl      Urobilinogen, UA <2.0 mg/dl      Bilirubin, UA Negative     Occult Blood, UA Negative    HS Troponin I 4hr [[id number]]     Lab Status: No result Specimen: Blood     D-Dimer [[id number]]  (Abnormal) Collected: 12/07/24 1309    Lab Status: Final result Specimen: Blood from Arm, Right Updated: 12/07/24 1415     D-Dimer, Quant 2.16 ug/ml FEU     Narrative:      In the evaluation for possible pulmonary embolism, in the appropriate (Well's Score of 4 or less) patient, the age adjusted d-dimer cutoff for this patient can be calculated as:    Age x 0.01 (in ug/mL) for Age-adjusted D-dimer exclusion threshold for a patient over 50 years.    HS Troponin 0hr (reflex protocol) [[id number]]  (Abnormal) Collected: 12/07/24 1309    Lab Status: Final result Specimen: Blood from Arm, Right Updated: 12/07/24 1353     hs TnI 0hr 116 ng/L     Comprehensive metabolic panel [[id number]]  (Abnormal) Collected: 12/07/24 1309    Lab Status: Final result Specimen: Blood from Arm, Right Updated: 12/07/24 1352     Sodium 144 mmol/L      Potassium 4.6 mmol/L      Chloride 105 mmol/L      CO2 25 mmol/L      ANION GAP 14 mmol/L      BUN 16 mg/dL      Creatinine 1.02 mg/dL      Glucose 77 mg/dL      Calcium 8.3 mg/dL      AST 46 U/L      ALT 31 U/L      Alkaline Phosphatase 108 U/L      Total Protein 7.0 g/dL      Albumin 3.6 g/dL      Total Bilirubin 0.51 mg/dL      eGFR 78 ml/min/1.73sq m      Narrative:      National Kid[employer] Disease Foundation guidelines for Chronic Kidney Disease (CKD):     Stage 1 with normal or high GFR (GFR > 90 mL/min/1.73 square meters)    Stage 2 Mild CKD (GFR = 60-89 mL/min/1.73 square meters)    Stage 3A Moderate CKD (GFR = 45-59 mL/min/1.73 square meters)    Stage 3B Moderate CKD (GFR = 30-44 mL/min/1.73 square meters)    Stage 4 Severe CKD (GFR = 15-29 mL/min/1.73 square meters)    Stage 5 End Stage CKD (GFR <15 mL/min/1.73 square meters)  Note: GFR calculation is accurate only with a steady state creatinine    Magnesium [[id number]]  (Abnormal) Collected: 12/07/24 1309    Lab Status: Final result Specimen: Blood from Arm, Right Updated: 12/07/24 1352     Magnesium 1.6 mg/dL     Lipase [[id number]]  (Abnormal) Collected: 12/07/24 1309    Lab Status: Final result Specimen: Blood from Arm, Right Updated: 12/07/24 1352     Lipase 87 u/L     B-Type Natriuretic Peptide(BNP) [[id number]]  (Abnormal) Collected: 12/07/24 1309    Lab Status: Final result Specimen: Blood from Arm, Right Updated: 12/07/24 1352      pg/mL     Digoxin level [[id number]]  (Abnormal) Collected: 12/07/24 1309    Lab Status: Final result Specimen: Blood from Arm, Right Updated: 12/07/24 1352     Digoxin Lvl <0.3 ng/mL     FLU/COVID Rapid Antigen (30 min. TAT) - Preferred screening test in ED [[id number]]  (Normal) Collected: 12/07/24 1309    Lab Status: Final result Specimen: Nares from Nose Updated: 12/07/24 1347     SARS COV Rapid Antigen Negative     Influenza A Rapid Antigen Negative     Influenza B Rapid Antigen Negative    Narrative:      This test has been performed using the [employer] 2 FLU+SARS Antigen test under the Emergency Use Authorization (EUA). This test has been validated by the  and verified by the performing laboratory. The Cecille uses lateral flow immunofluorescent sandwich assay to detect SARS-COV, Influenza A and Influenza B Antigen.     The [employer] Cecille 2 SARS Antigen test  does not differentiate between SARS-CoV and SARS-CoV-2.     Negative results are presumptive and may be confirmed with a molecular assay, if necessary, for patient management. Negative results do not rule out SARS-CoV-2 or influenza infection and should not be used as the sole basis for treatment or patient management decisions. A negative test result may occur if the level of antigen in a sample is below the limit of detection of this test.     Positive results are indicative of the presence of viral antigens, but do not rule out bacterial infection or co-infection with other viruses.     All test results should be used as an adjunct to clinical observations and other information available to the provider.    FOR PEDIATRIC PATIENTS - copy/paste COVID Guidelines URL to browser: [email or web address]    Ethanol [[id number]]  (Abnormal) Collected: 12/07/24 1309    Lab Status: Final result Specimen: Blood from Arm, Right Updated: 12/07/24 1344     Ethanol Lvl 428 mg/dL     Protime-INR [[id number]]  (Normal) Collected: 12/07/24 1309    Lab Status: Final result Specimen: Blood from Arm, Right Updated: 12/07/24 1340     Protime 14.4 seconds      INR 1.07    Narrative:      INR Therapeutic Range    Indication                                             INR Range      Atrial Fibrillation                                               2.0-3.0  Hypercoagulable State                                    2.0.2.3  Left Ventricular Asist Device                            2.0-3.0  Mechanical Heart Valve                                  -    Aortic(with afib, MI, embolism, HF, LA enlargement,    and/or coagulopathy)                                     2.0-3.0 (2.5-3.5)     Mitral                                                             2.5-3.5  Prosthetic/Bioprosthetic Heart Valve               2.0-3.0  Venous thromboembolism (VTE: VT, PE        2.0-3.0    APTT [[id number]]   (Normal) Collected: 12/07/24 1309    Lab Status: Final result Specimen: Blood from Arm, Right Updated: 12/07/24 1340     PTT 29 seconds     CBC and differential [[id number]]  (Abnormal) Collected: 12/07/24 1309    Lab Status: Final result Specimen: Blood from Arm, Right Updated: 12/07/24 1329     WBC 5.74 Thousand/uL      RBC 3.53 Million/uL      Hemoglobin 11.6 g/dL      Hematocrit 35.4 %       fL      MCH 32.9 pg      MCHC 32.8 g/dL      RDW 16.4 %      MPV 9.5 fL      Platelets 192 Thousands/uL      nRBC 0 /100 WBCs      Segmented % 57 %      Immature Grans % 0 %      Lymphocytes % 31 %      Monocytes % 7 %      Eosinophils Relative 2 %      Basophils Relative 3 %      Absolute Neutrophils 3.24 Thousands/µL      Absolute Immature Grans 0.02 Thousand/uL      Absolute Lymphocytes 1.79 Thousands/µL      Absolute Monocytes 0.40 Thousand/µL      Eosinophils Absolute 0.13 Thousand/µL      Basophils Absolute 0.16 Thousands/µL             XR chest portable   Final Interpretation by Edgar Sanchez MD (12/08 1449)   Central congestion without acute consolidation.      Workstation performed: [id number]         CTA chest pe study   Final Interpretation by Zuly Chacon MD (12/07 1736)      No pulmonary embolism.      Left lower lobe bronchitis with mucous plugging and mucus/debris extending from the left mainstem bronchus into the left lower lobe bronchus with occlusion.      Workstation performed: [id number]         XR chest 1 view portable   Final Interpretation by Heidy Welch MD (12/08 0654)      No acute cardiopulmonary disease.      See subsequent chest CT for further evaluation.      Workstation performed: [id number]             ECG 12 Lead Documentation Only    Date/Time: 12/7/2024 2:55 PM    Performed by: Quinn Krishna DO  Authorized by: Quinn Krishna DO    ECG reviewed by me, the ED Provider: yes    Patient location:  ED  Interpretation:     Interpretation: abnormal    Rate:     ECG rate:  101     ECG rate assessment: tachycardic    Rhythm:     Rhythm: atrial fibrillation    Ectopy:     Ectopy: none    QRS:     QRS axis:  Right  ST segments:     ST segments:  Non-specific  T waves:     T waves: non-specific    Q waves:     Q waves:  V1 and V2  CriticalCare Time    Date/Time: 12/7/2024 3:18 PM    Performed by: Quinn Krishna DO  Authorized by: Quinn Krishna DO    Critical care provider statement:     Critical care time (minutes):  45    Critical care time was exclusive of:  Separately billable procedures and treating other patients and teaching time    Critical care was necessary to treat or prevent imminent or life-threatening deterioration of the following conditions: A-fib with RVR, alcohol intoxication.    Critical care was time spent personally by me on the following activities:  Blood draw for specimens, obtaining history from patient or surrogate, development of treatment plan with patient or surrogate, discussions with consultants, evaluation of patient's response to treatment, examination of patient, review of old charts, re-evaluation of patient's condition, ordering and review of radiographic studies, ordering and review of laboratory studies, ordering and performing treatments and interventions and interpretation of cardiac output measurements  Comments:      Initially rate was controlled on arrival and began to increase started on IV fluids as well as Cardizem drip.  He also got repeat Cardizem boluses as well as IV Valium for agitation and alcohol withdrawal.  After discussion with hospitalist team decision made to load patient with digoxin has used previously on this and his level is subtherapeutic likely secondary noncompliance.      ED Medication and Procedure Management   Prior to Admission Medications   Prescriptions Last Dose Informant Patient Reported? Taking?   Blood Glucose Monitoring Suppl (ONE TOUCH ULTRA MINI) w/Device KIT Unknown Self Yes No   Sig: Use as directed   Patient not  taking: Reported on 3/25/2024   Blood Pressure Monitoring (Adult Blood Pressure Cuff Lg) KIT Unknown  No No   Sig: Use in the morning   Patient not taking: Reported on 3/25/2024   Breo Ellipta 200-25 MCG/ACT inhaler Unknown Self No No   Sig: Inhale 1 puff daily Rinse mouth after use.   ONETOUCH DELICA LANCETS FINE MISC Unknown Self Yes No   Sig: 3 (three) times a day Test   Thiamine HCl (vitamin B-1) 100 MG TABS  Self No No   Sig: TAKE 1 TABLET DAILY   albuterol (Ventolin HFA) 90 mcg/act inhaler Unknown Self No No   Sig: Inhale 2 puffs every 4 (four) hours as needed for wheezing   aluminum-magnesium hydroxide-simethicone (MAALOX) 8128-7424-500 mg/30 mL suspension Unknown  No No   Sig: Take 30 mL by mouth every 4 (four) hours as needed for indigestion or heartburn   amLODIPine (NORVASC) 5 mg tablet Unknown  No No   Sig: Take 1 tablet (5 mg total) by mouth daily   apixaban (Eliquis) 5 mg Unknown  No No   Sig: TAKE 1 TABLET BY MOUTH 2 TIMES A DAY DO NOT START BEFORE JANUARY 31, 2024.   aspirin (ECOTRIN LOW STRENGTH) 81 mg EC tablet Unknown Self Yes No   Sig: Take 81 mg by mouth daily   digoxin (LANOXIN) 0.125 mg tablet Unknown  No No   Sig: Take 1 tablet (125 mcg total) by mouth daily   ferrous sulfate 325 (65 Fe) mg tablet Unknown Self No No   Sig: Take 1 tablet (325 mg total) by mouth daily with breakfast   folic acid (FOLVITE) 1 mg tablet Unknown  No No   Sig: TAKE 1 TABLET DAILY   gabapentin (NEURONTIN) 300 mg capsule Unknown  No No   Sig: TAKE ONE CAPSULE THREE TIMES DAILY   lidocaine (Lidoderm) 5 % Unknown  No No   Sig: Apply 1 patch topically over 12 hours daily Remove & Discard patch within 12 hours or as directed by MD   magnesium Oxide (MAG-OX) 400 mg TABS Unknown  No No   Sig: Take 2 tablets (800 mg total) by mouth 2 (two) times a day   metFORMIN (GLUCOPHAGE) 500 mg tablet Unknown  No No   Sig: TAKE 1 TABLET DAILY WITH BREAKFAST   metoprolol tartrate (LOPRESSOR) 100 mg tablet Unknown  No No   Sig: Take 2  tablets (200 mg total) by mouth every 12 (twelve) hours   naltrexone (REVIA) 50 mg tablet   No No   Sig: Take 1 tablet (50 mg total) by mouth daily   naproxen (Naprosyn) 500 mg tablet Unknown  No No   Sig: Take 1 tablet (500 mg total) by mouth 2 (two) times a day with meals   nicotine (NICODERM CQ) 21 mg/24 hr TD 24 hr patch Unknown Self No No   Sig: Place 1 patch on the skin over 24 hours daily Do not start before December 4, 2023.   pantoprazole (PROTONIX) 40 mg tablet Unknown  No No   Sig: TAKE 1 TABLET TWO TIMES A DAY   ranolazine (RANEXA) 500 mg 12 hr tablet Unknown  No No   Sig: TAKE 1 TABLET EVERY 12 HOURS   senna-docusate sodium (SENOKOT S) 8.6-50 mg per tablet Unknown  No No   Sig: Take 1 tablet by mouth daily as needed for constipation   sodium chloride 1 g tablet Unknown  No No   Sig: Take 2 tablets (2 g total) by mouth 2 (two) times a day with meals   tamsulosin (FLOMAX) 0.4 mg Unknown  No No   Sig: TAKE 1 CAPSULE DAILY      Facility-Administered Medications: None     Current Discharge Medication List        CONTINUE these medications which have NOT CHANGED    Details   albuterol (Ventolin HFA) 90 mcg/act inhaler Inhale 2 puffs every 4 (four) hours as needed for wheezing  Qty: 18 g, Refills: 0    Comments: Substitution to a formulary equivalent within the same pharmaceutical class is authorized.  Associated Diagnoses: COPD (chronic obstructive pulmonary disease) (HCC)      aluminum-magnesium hydroxide-simethicone (MAALOX) 6154-8379-112 mg/30 mL suspension Take 30 mL by mouth every 4 (four) hours as needed for indigestion or heartburn  Qty: 769 mL, Refills: 1    Associated Diagnoses: Gastroesophageal reflux disease, unspecified whether esophagitis present      amLODIPine (NORVASC) 5 mg tablet Take 1 tablet (5 mg total) by mouth daily  Qty: 30 tablet, Refills: 0    Associated Diagnoses: Resistant hypertension      apixaban (Eliquis) 5 mg TAKE 1 TABLET BY MOUTH 2 TIMES A DAY DO NOT START BEFORE JANUARY 31,  2024.  Qty: 180 tablet, Refills: 2    Comments: PATIENT IS REQUESTING REFILLS. THANKS!  Associated Diagnoses: Paroxysmal atrial fibrillation ([facility])      aspirin (ECOTRIN LOW STRENGTH) 81 mg EC tablet Take 81 mg by mouth daily      Blood Glucose Monitoring Suppl (ONE TOUCH ULTRA MINI) w/Device KIT Use as directed  Refills: 0      Blood Pressure Monitoring (Adult Blood Pressure Cuff Lg) KIT Use in the morning  Qty: 1 kit, Refills: 0    Associated Diagnoses: Hypertension      Breo Ellipta 200-25 MCG/ACT inhaler Inhale 1 puff daily Rinse mouth after use.  Qty: 60 each, Refills: 3    Associated Diagnoses: COPD (chronic obstructive pulmonary disease) ([facility])      digoxin (LANOXIN) 0.125 mg tablet Take 1 tablet (125 mcg total) by mouth daily  Qty: 30 tablet, Refills: 1    Associated Diagnoses: Atrial fibrillation with rapid ventricular response ([facility])      ferrous sulfate 325 (65 Fe) mg tablet Take 1 tablet (325 mg total) by mouth daily with breakfast  Qty: 60 tablet, Refills: 5    Associated Diagnoses: Alcohol abuse      folic acid (FOLVITE) 1 mg tablet TAKE 1 TABLET DAILY  Qty: 90 tablet, Refills: 2    Associated Diagnoses: Alcohol abuse      gabapentin (NEURONTIN) 300 mg capsule TAKE ONE CAPSULE THREE TIMES DAILY  Qty: 90 capsule, Refills: 1    Associated Diagnoses: Edema, spinal cord ([facility])      lidocaine (Lidoderm) 5 % Apply 1 patch topically over 12 hours daily Remove & Discard patch within 12 hours or as directed by MD  Qty: 14 patch, Refills: 0    Associated Diagnoses: Back pain; Contusion      magnesium Oxide (MAG-OX) 400 mg TABS Take 2 tablets (800 mg total) by mouth 2 (two) times a day  Qty: 120 tablet, Refills: 1    Associated Diagnoses: Hypomagnesemia      metFORMIN (GLUCOPHAGE) 500 mg tablet TAKE 1 TABLET DAILY WITH BREAKFAST  Qty: 90 tablet, Refills: 1    Associated Diagnoses: Type 2 diabetes mellitus with hyperosmolarity without coma, without long-term current use of insulin ([facility])      metoprolol tartrate  (LOPRESSOR) 100 mg tablet Take 2 tablets (200 mg total) by mouth every 12 (twelve) hours  Qty: 120 tablet, Refills: 2    Associated Diagnoses: Atrial fibrillation with rapid ventricular response (HCC)      naltrexone (REVIA) 50 mg tablet Take 1 tablet (50 mg total) by mouth daily  Qty: 30 tablet, Refills: 2    Associated Diagnoses: Alcohol abuse      naproxen (Naprosyn) 500 mg tablet Take 1 tablet (500 mg total) by mouth 2 (two) times a day with meals  Qty: 30 tablet, Refills: 0    Associated Diagnoses: Back pain; Contusion      nicotine (NICODERM CQ) 21 mg/24 hr TD 24 hr patch Place 1 patch on the skin over 24 hours daily Do not start before December 4, 2023.  Qty: 28 patch, Refills: 0    Associated Diagnoses: Nicotine abuse      ONETOUCH DELICA LANCETS FINE MISC 3 (three) times a day Test  Refills: 0      pantoprazole (PROTONIX) 40 mg tablet TAKE 1 TABLET TWO TIMES A DAY  Qty: 60 tablet, Refills: 2    Associated Diagnoses: Gastroesophageal reflux disease, unspecified whether esophagitis present      ranolazine (RANEXA) 500 mg 12 hr tablet TAKE 1 TABLET EVERY 12 HOURS  Qty: 60 tablet, Refills: 3    Associated Diagnoses: Essential (primary) hypertension      senna-docusate sodium (SENOKOT S) 8.6-50 mg per tablet Take 1 tablet by mouth daily as needed for constipation    Associated Diagnoses: Constipation      sodium chloride 1 g tablet Take 2 tablets (2 g total) by mouth 2 (two) times a day with meals  Qty: 120 tablet, Refills: 0    Associated Diagnoses: Chronic hyponatremia      tamsulosin (FLOMAX) 0.4 mg TAKE 1 CAPSULE DAILY  Qty: 90 capsule, Refills: 1    Associated Diagnoses: Hx of prostatic malignancy      Thiamine HCl (vitamin B-1) 100 MG TABS TAKE 1 TABLET DAILY  Qty: 90 tablet, Refills: 0    Associated Diagnoses: Alcohol abuse           No discharge procedures on file.  ED SEPSIS DOCUMENTATION   Time reflects when diagnosis was documented in both MDM as applicable and the Disposition within this note        Time User Action Codes Description Comment    12/7/2024  2:04 PM Quinn Krishna [I48.91] Atrial fibrillation with rapid ventricular response (HCC)     12/7/2024  2:04 PM Quinn Krishna [F10.929] Alcohol intoxication (HCC)     12/7/2024  2:04 PM Quinn Krishna [R09.02] Hypoxia     12/8/2024 12:32 PM Pablito Presley [J96.01] Acute respiratory failure with hypoxia ([facility])     12/8/2024 12:32 PM Pablito Presley [T17.500A] Mucus plugging of bronchi                  Quinn Krishna, DO  12/08/24 1519       Quinn Krishna,   12/13/24 2039

## 2024-12-07 NOTE — ED NOTES
Walked down to lab by CRISTIAN Mccoy, WILLIAM. Lab aware of collection drop off, per PCA.         Neeta Maradiaga RN  12/07/24 5343

## 2024-12-07 NOTE — ED NOTES
Pt continues to use the call bell,  requested something else to eat, made aware that he just completed box lunch and we are trying to bring his HR down. He is noted restless and also requested something to help him sleep. NO n/v, no tremors noted. No diaphoresis noted.      Rhea Eason, RN  12/07/24 7962

## 2024-12-07 NOTE — ED NOTES
Patient transferred from CT2 from REINA Eason RN and CRISTIAN Ryder RN. Report pending.        Neeta Maradiaga RN  12/07/24 9096

## 2024-12-08 ENCOUNTER — APPOINTMENT (OUTPATIENT)
Dept: RADIOLOGY | Facility: HOSPITAL | Age: 64
DRG: 308 | End: 2024-12-08
Payer: COMMERCIAL

## 2024-12-08 PROBLEM — T17.500A MUCUS PLUGGING OF BRONCHI: Status: ACTIVE | Noted: 2024-12-08

## 2024-12-08 PROBLEM — J96.01 ACUTE RESPIRATORY FAILURE WITH HYPOXIA (HCC): Status: ACTIVE | Noted: 2024-12-07

## 2024-12-08 LAB
ALBUMIN SERPL BCG-MCNC: 3.6 G/DL (ref 3.5–5)
ALP SERPL-CCNC: 112 U/L (ref 34–104)
ALT SERPL W P-5'-P-CCNC: 28 U/L (ref 7–52)
ANION GAP SERPL CALCULATED.3IONS-SCNC: 9 MMOL/L (ref 4–13)
AST SERPL W P-5'-P-CCNC: 35 U/L (ref 13–39)
ATRIAL RATE: 300 BPM
BASOPHILS # BLD AUTO: 0.09 THOUSANDS/ÂΜL (ref 0–0.1)
BASOPHILS NFR BLD AUTO: 1 % (ref 0–1)
BILIRUB SERPL-MCNC: 0.76 MG/DL (ref 0.2–1)
BUN SERPL-MCNC: 15 MG/DL (ref 5–25)
CALCIUM SERPL-MCNC: 8 MG/DL (ref 8.4–10.2)
CHLORIDE SERPL-SCNC: 105 MMOL/L (ref 96–108)
CO2 SERPL-SCNC: 26 MMOL/L (ref 21–32)
CREAT SERPL-MCNC: 0.88 MG/DL (ref 0.6–1.3)
EOSINOPHIL # BLD AUTO: 0.18 THOUSAND/ÂΜL (ref 0–0.61)
EOSINOPHIL NFR BLD AUTO: 2 % (ref 0–6)
ERYTHROCYTE [DISTWIDTH] IN BLOOD BY AUTOMATED COUNT: 16.1 % (ref 11.6–15.1)
GFR SERPL CREATININE-BSD FRML MDRD: 92 ML/MIN/1.73SQ M
GLUCOSE P FAST SERPL-MCNC: 122 MG/DL (ref 65–99)
GLUCOSE SERPL-MCNC: 116 MG/DL (ref 65–140)
GLUCOSE SERPL-MCNC: 122 MG/DL (ref 65–140)
GLUCOSE SERPL-MCNC: 129 MG/DL (ref 65–140)
GLUCOSE SERPL-MCNC: 157 MG/DL (ref 65–140)
GLUCOSE SERPL-MCNC: 161 MG/DL (ref 65–140)
HCT VFR BLD AUTO: 34.8 % (ref 36.5–49.3)
HGB BLD-MCNC: 11 G/DL (ref 12–17)
IMM GRANULOCYTES # BLD AUTO: 0.02 THOUSAND/UL (ref 0–0.2)
IMM GRANULOCYTES NFR BLD AUTO: 0 % (ref 0–2)
LYMPHOCYTES # BLD AUTO: 1 THOUSANDS/ÂΜL (ref 0.6–4.47)
LYMPHOCYTES NFR BLD AUTO: 13 % (ref 14–44)
MAGNESIUM SERPL-MCNC: 1.6 MG/DL (ref 1.9–2.7)
MCH RBC QN AUTO: 32.1 PG (ref 26.8–34.3)
MCHC RBC AUTO-ENTMCNC: 31.6 G/DL (ref 31.4–37.4)
MCV RBC AUTO: 102 FL (ref 82–98)
MONOCYTES # BLD AUTO: 0.65 THOUSAND/ÂΜL (ref 0.17–1.22)
MONOCYTES NFR BLD AUTO: 9 % (ref 4–12)
NEUTROPHILS # BLD AUTO: 5.51 THOUSANDS/ÂΜL (ref 1.85–7.62)
NEUTS SEG NFR BLD AUTO: 75 % (ref 43–75)
NRBC BLD AUTO-RTO: 0 /100 WBCS
P AXIS: 87 DEGREES
PLATELET # BLD AUTO: 159 THOUSANDS/UL (ref 149–390)
PMV BLD AUTO: 9.6 FL (ref 8.9–12.7)
POTASSIUM SERPL-SCNC: 4.7 MMOL/L (ref 3.5–5.3)
PROT SERPL-MCNC: 6.6 G/DL (ref 6.4–8.4)
QRS AXIS: 95 DEGREES
QRSD INTERVAL: 86 MS
QT INTERVAL: 362 MS
QTC INTERVAL: 469 MS
RBC # BLD AUTO: 3.43 MILLION/UL (ref 3.88–5.62)
SODIUM SERPL-SCNC: 140 MMOL/L (ref 135–147)
T WAVE AXIS: 257 DEGREES
VENTRICULAR RATE: 101 BPM
WBC # BLD AUTO: 7.45 THOUSAND/UL (ref 4.31–10.16)

## 2024-12-08 PROCEDURE — 94640 AIRWAY INHALATION TREATMENT: CPT

## 2024-12-08 PROCEDURE — 80053 COMPREHEN METABOLIC PANEL: CPT

## 2024-12-08 PROCEDURE — 94760 N-INVAS EAR/PLS OXIMETRY 1: CPT

## 2024-12-08 PROCEDURE — 93010 ELECTROCARDIOGRAM REPORT: CPT | Performed by: INTERNAL MEDICINE

## 2024-12-08 PROCEDURE — 94668 MNPJ CHEST WALL SBSQ: CPT

## 2024-12-08 PROCEDURE — 94669 MECHANICAL CHEST WALL OSCILL: CPT

## 2024-12-08 PROCEDURE — 99233 SBSQ HOSP IP/OBS HIGH 50: CPT | Performed by: INTERNAL MEDICINE

## 2024-12-08 PROCEDURE — 94664 DEMO&/EVAL PT USE INHALER: CPT

## 2024-12-08 PROCEDURE — 85025 COMPLETE CBC W/AUTO DIFF WBC: CPT

## 2024-12-08 PROCEDURE — 99215 OFFICE O/P EST HI 40 MIN: CPT | Performed by: INTERNAL MEDICINE

## 2024-12-08 PROCEDURE — 83735 ASSAY OF MAGNESIUM: CPT

## 2024-12-08 PROCEDURE — 71045 X-RAY EXAM CHEST 1 VIEW: CPT

## 2024-12-08 PROCEDURE — 94003 VENT MGMT INPAT SUBQ DAY: CPT

## 2024-12-08 PROCEDURE — 82948 REAGENT STRIP/BLOOD GLUCOSE: CPT

## 2024-12-08 PROCEDURE — 99222 1ST HOSP IP/OBS MODERATE 55: CPT | Performed by: INTERNAL MEDICINE

## 2024-12-08 PROCEDURE — 87081 CULTURE SCREEN ONLY: CPT

## 2024-12-08 PROCEDURE — 99223 1ST HOSP IP/OBS HIGH 75: CPT

## 2024-12-08 RX ORDER — DILTIAZEM HCL 60 MG
60 TABLET ORAL EVERY 6 HOURS SCHEDULED
Status: DISCONTINUED | OUTPATIENT
Start: 2024-12-08 | End: 2024-12-09 | Stop reason: HOSPADM

## 2024-12-08 RX ORDER — SODIUM CHLORIDE FOR INHALATION 3 %
4 VIAL, NEBULIZER (ML) INHALATION
Status: DISCONTINUED | OUTPATIENT
Start: 2024-12-08 | End: 2024-12-09 | Stop reason: HOSPADM

## 2024-12-08 RX ORDER — PHENOBARBITAL SODIUM 65 MG/ML
65 INJECTION, SOLUTION INTRAMUSCULAR; INTRAVENOUS ONCE
Status: COMPLETED | OUTPATIENT
Start: 2024-12-08 | End: 2024-12-08

## 2024-12-08 RX ORDER — CHLORHEXIDINE GLUCONATE ORAL RINSE 1.2 MG/ML
15 SOLUTION DENTAL EVERY 12 HOURS SCHEDULED
Status: DISCONTINUED | OUTPATIENT
Start: 2024-12-08 | End: 2024-12-09 | Stop reason: HOSPADM

## 2024-12-08 RX ORDER — LEVALBUTEROL INHALATION SOLUTION 0.63 MG/3ML
0.63 SOLUTION RESPIRATORY (INHALATION) EVERY 4 HOURS PRN
Status: DISCONTINUED | OUTPATIENT
Start: 2024-12-08 | End: 2024-12-09 | Stop reason: HOSPADM

## 2024-12-08 RX ORDER — MAGNESIUM SULFATE HEPTAHYDRATE 40 MG/ML
4 INJECTION, SOLUTION INTRAVENOUS ONCE
Status: COMPLETED | OUTPATIENT
Start: 2024-12-08 | End: 2024-12-08

## 2024-12-08 RX ORDER — FUROSEMIDE 10 MG/ML
40 INJECTION INTRAMUSCULAR; INTRAVENOUS ONCE
Status: COMPLETED | OUTPATIENT
Start: 2024-12-08 | End: 2024-12-08

## 2024-12-08 RX ORDER — LORAZEPAM 1 MG/1
2 TABLET ORAL ONCE
Status: COMPLETED | OUTPATIENT
Start: 2024-12-08 | End: 2024-12-08

## 2024-12-08 RX ORDER — LEVALBUTEROL INHALATION SOLUTION 0.63 MG/3ML
0.63 SOLUTION RESPIRATORY (INHALATION)
Status: DISCONTINUED | OUTPATIENT
Start: 2024-12-08 | End: 2024-12-09 | Stop reason: HOSPADM

## 2024-12-08 RX ADMIN — GUAIFENESIN 600 MG: 600 TABLET, EXTENDED RELEASE ORAL at 08:46

## 2024-12-08 RX ADMIN — FOLIC ACID 1000 MCG: 1 TABLET ORAL at 08:46

## 2024-12-08 RX ADMIN — Medication 4 ML: at 13:05

## 2024-12-08 RX ADMIN — LORAZEPAM 2 MG: 1 TABLET ORAL at 09:04

## 2024-12-08 RX ADMIN — MAGNESIUM SULFATE HEPTAHYDRATE 4 G: 40 INJECTION, SOLUTION INTRAVENOUS at 07:44

## 2024-12-08 RX ADMIN — APIXABAN 5 MG: 5 TABLET, FILM COATED ORAL at 17:28

## 2024-12-08 RX ADMIN — SODIUM CHLORIDE, SODIUM LACTATE, POTASSIUM CHLORIDE, AND CALCIUM CHLORIDE 100 ML/HR: .6; .31; .03; .02 INJECTION, SOLUTION INTRAVENOUS at 07:04

## 2024-12-08 RX ADMIN — RANOLAZINE 500 MG: 500 TABLET, FILM COATED, EXTENDED RELEASE ORAL at 20:01

## 2024-12-08 RX ADMIN — LEVALBUTEROL HYDROCHLORIDE 0.63 MG: 0.63 SOLUTION RESPIRATORY (INHALATION) at 09:17

## 2024-12-08 RX ADMIN — ASPIRIN 81 MG CHEWABLE TABLET 81 MG: 81 TABLET CHEWABLE at 08:46

## 2024-12-08 RX ADMIN — LEVALBUTEROL HYDROCHLORIDE 0.63 MG: 0.63 SOLUTION RESPIRATORY (INHALATION) at 13:05

## 2024-12-08 RX ADMIN — METOPROLOL TARTRATE 200 MG: 100 TABLET, FILM COATED ORAL at 08:46

## 2024-12-08 RX ADMIN — DILTIAZEM HYDROCHLORIDE 60 MG: 60 TABLET ORAL at 17:28

## 2024-12-08 RX ADMIN — DILTIAZEM HYDROCHLORIDE 60 MG: 60 TABLET ORAL at 12:39

## 2024-12-08 RX ADMIN — GUAIFENESIN 600 MG: 600 TABLET, EXTENDED RELEASE ORAL at 21:02

## 2024-12-08 RX ADMIN — LEVALBUTEROL HYDROCHLORIDE 0.63 MG: 0.63 SOLUTION RESPIRATORY (INHALATION) at 16:46

## 2024-12-08 RX ADMIN — TAMSULOSIN HYDROCHLORIDE 0.4 MG: 0.4 CAPSULE ORAL at 08:46

## 2024-12-08 RX ADMIN — INSULIN LISPRO 1 UNITS: 100 INJECTION, SOLUTION INTRAVENOUS; SUBCUTANEOUS at 12:39

## 2024-12-08 RX ADMIN — FUROSEMIDE 40 MG: 10 INJECTION, SOLUTION INTRAMUSCULAR; INTRAVENOUS at 17:27

## 2024-12-08 RX ADMIN — PANTOPRAZOLE SODIUM 40 MG: 40 TABLET, DELAYED RELEASE ORAL at 07:04

## 2024-12-08 RX ADMIN — Medication 4 ML: at 09:17

## 2024-12-08 RX ADMIN — FERROUS SULFATE TAB 325 MG (65 MG ELEMENTAL FE) 325 MG: 325 (65 FE) TAB at 07:44

## 2024-12-08 RX ADMIN — CHLORHEXIDINE GLUCONATE 15 ML: 1.2 RINSE ORAL at 20:00

## 2024-12-08 RX ADMIN — NICOTINE 1 PATCH: 21 PATCH, EXTENDED RELEASE TRANSDERMAL at 08:46

## 2024-12-08 RX ADMIN — Medication 4 ML: at 19:09

## 2024-12-08 RX ADMIN — GABAPENTIN 300 MG: 300 CAPSULE ORAL at 08:46

## 2024-12-08 RX ADMIN — PHENOBARBITAL SODIUM 260 MG: 65 INJECTION INTRAMUSCULAR at 20:56

## 2024-12-08 RX ADMIN — FLUTICASONE FUROATE AND VILANTEROL TRIFENATATE 1 PUFF: 200; 25 POWDER RESPIRATORY (INHALATION) at 08:46

## 2024-12-08 RX ADMIN — PHENOBARBITAL SODIUM 65 MG: 65 INJECTION INTRAMUSCULAR at 17:45

## 2024-12-08 RX ADMIN — GABAPENTIN 300 MG: 300 CAPSULE ORAL at 21:02

## 2024-12-08 RX ADMIN — DILTIAZEM HYDROCHLORIDE 60 MG: 60 TABLET ORAL at 07:44

## 2024-12-08 RX ADMIN — THIAMINE HCL TAB 100 MG 100 MG: 100 TAB at 08:45

## 2024-12-08 RX ADMIN — RANOLAZINE 500 MG: 500 TABLET, FILM COATED, EXTENDED RELEASE ORAL at 07:44

## 2024-12-08 RX ADMIN — GABAPENTIN 300 MG: 300 CAPSULE ORAL at 16:45

## 2024-12-08 RX ADMIN — INSULIN LISPRO 1 UNITS: 100 INJECTION, SOLUTION INTRAVENOUS; SUBCUTANEOUS at 21:35

## 2024-12-08 RX ADMIN — PANTOPRAZOLE SODIUM 40 MG: 40 TABLET, DELAYED RELEASE ORAL at 16:45

## 2024-12-08 RX ADMIN — APIXABAN 5 MG: 5 TABLET, FILM COATED ORAL at 08:46

## 2024-12-08 RX ADMIN — LEVALBUTEROL HYDROCHLORIDE 0.63 MG: 0.63 SOLUTION RESPIRATORY (INHALATION) at 19:08

## 2024-12-08 NOTE — ASSESSMENT & PLAN NOTE
Wt Readings from Last 3 Encounters:   12/07/24 87.1 kg (192 lb)   11/28/24 83 kg (182 lb 15.7 oz)   11/25/24 81.2 kg (179 lb)   Weight gain as outlined above  40 mg Lasix now  I&O's, daily weights

## 2024-12-08 NOTE — CONSULTS
Consultation - Cardiology   Name: Gregg Partida 64 y.o. male I MRN: [id number]  Unit/Bed#: 4 [place] 415-01 I Date of Admission: 12/7/2024   Date of Service: 12/8/2024 I Hospital Day: 0   Inpatient consult to Cardiology  Consult performed by: CHELSEA Diaz  Consult ordered by: Issa Bates MD        Physician Requesting Evaluation: Samuel Thompson MD   Reason for Evaluation / Principal Problem: rapid atrial fibrillation/flutter in the setting of medication noncompliance    Assessment & Plan  Atrial fibrillation with RVR (HCC)  patient with chronic atrial fibrillation  rapid rate in the setting of alcohol use and medication noncompliance  heart rate improved after patient was placed on Cardizem drip which is currently at 7.5 mg per hour  will discontinue IV Cardizem and convert patient over to oral Cardizem  continue Lopressor 200 mg BID  patient started on digoxin load per primary team  Continue Eliquis 5 mg bid  will follow rate control strategy  Elevated troponin  high-sensitivity troponins: 116 (0hr), 107 (2hr)  non-MI troponin elevation secondary to rapid heart rate, frequent falls at home and shortness of breath/hypoxia  Alcohol abuse  alcohol level 428  patient on CIWA protocol  Chronic heart failure with preserved ejection fraction (HCC)  Wt Readings from Last 3 Encounters:   12/07/24 87.1 kg (192 lb)   11/28/24 83 kg (182 lb 15.7 oz)   11/25/24 81.2 kg (179 lb)   8/16/2024 TTE: EF visibly estimated 65% with severe left atrial dilatation    shortness of breath most likely due to mucus plugging  monitor I and O, daily weights and labs  CHF education          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 disease (HCC)  Lab Results   Component Value Date    HGBA1C 5.2 11/14/2024       Recent Labs     12/07/24  2132   POCGLU 108       Blood Sugar Average: Last 72 hrs:  (P) 108  managed per primary team  Sleep apnea  noncompliant with CPAP  Ambulatory dysfunction  secondary to chronic  alcohol use      History of Present Illness   Gregg Partida is a 64 y.o. male who who has a long history of readmission to the hospital due to medication noncompliance in atrial fibrillation presented to the emergency room after calling EMS with complaints of palpitations and chest pain. On EMS arrival they found him to be in rapid atrial fibrillation/flutter rates in the 150s. Patient notes that he has not been eating for the last three days and only drinking vodka. His last drink was a few hours prior to admission. He was not compliant with his medications also.    Labs in the emergency room note magnesium of 1.6, BNP was 131, high-sensitivity troponins were 116 and 107. Digoxin level was less than 0.3. Blood alcohol was 428 and urine drug screen was positive for barbiturates and benzodiazepine. But of note urine was pulled after patient had received sedation secondary to CIWA protocol.    CT PE protocol noted no pulmonary embolus, but there was mucus plugging and debris from left main stem to left lower lobe with occlusion. Question aspiration secondary to intoxication    Review of Systems   Constitutional:  Positive for activity change and fatigue.   HENT: Negative.  Negative for congestion, facial swelling, postnasal drip and sore throat.    Eyes: Negative.  Negative for photophobia and visual disturbance.   Respiratory: Negative.  Negative for cough, chest tightness and shortness of breath.    Cardiovascular:  Positive for palpitations. Negative for chest pain and leg swelling.   Gastrointestinal: Negative.  Negative for abdominal distention, abdominal pain, diarrhea, nausea and vomiting.   Endocrine: Negative.  Negative for polydipsia, polyphagia and polyuria.   Genitourinary: Negative.  Negative for difficulty urinating.   Musculoskeletal:  Positive for back pain and gait problem.   Skin: Negative.    Neurological:  Positive for dizziness, tremors and weakness. Negative for speech difficulty and  light-headedness.   Hematological: Negative.    Psychiatric/Behavioral: Negative.       I have reviewed the patient's PMH, PSH, Social History, Family History, Meds, and Allergies  Historical Information   Past Medical History:   Diagnosis Date    Acute on chronic kidney failure  (HCC)     Alcohol withdrawal (HCC) 06/07/2019    Atrial fibrillation (HCC)     Cancer (HCC)     prostate ca,had radiation    Cardiac disease     stents,then triple bypass    COPD (chronic obstructive pulmonary disease) (HCC)     Coronary artery disease     ETOH abuse     Heart failure (HCC)     History of heart surgery     says triple bypass [facility]    Hx of heart artery stent     2014    Hyperlipidemia     Hypertension     Hyponatremia 10/17/2019    Hypovolemic shock (HCC) 12/22/2019    Lumbar spondylitis ([facility]) 10/13/2022    Metabolic acidosis, increased anion gap 04/21/2021    Nasal bone fracture 10/10/2022    Prostate CA ([facility])     S/P CABG x 3     2004    Sleep apnea      Past Surgical History:   Procedure Laterality Date    CARDIAC CATHETERIZATION      2 stents    CORONARY ARTERY BYPASS GRAFT  2004    HI ARTHRD ANT INTERBODY MIN DSC CRV BELOW C2 N/A 12/16/2020    Procedure: Anterior cervical discectomy with fusion C4-C7; Posterior cervical decompression and fusion C2-T2;  Surgeon: aDvid Rowell MD;  Location: [facility];  Service: Neurosurgery    TONSILLECTOMY       Social History     Tobacco Use    Smoking status: Every Day     Current packs/day: 1.50     Average packs/day: 1.5 packs/day for 40.0 years (60.0 ttl pk-yrs)     Types: Cigarettes    Smokeless tobacco: Never   Vaping Use    Vaping status: Never Used   Substance and Sexual Activity    Alcohol use: Yes     Alcohol/week: 4.0 standard drinks of alcohol     Types: 4 Standard drinks or equivalent per week     Comment: quart of vodka daily    Drug use: No    Sexual activity: Not Currently     E-Cigarette/Vaping    E-Cigarette Use Never User      E-Cigarette/Vaping Substances     Nicotine Yes     THC No     CBD No     Flavoring No     Other No     Unknown No      Family History   Problem Relation Age of Onset    Diabetes Mother     Uterine cancer Mother     COPD Father     Hypertension Father      Social History     Tobacco Use    Smoking status: Every Day     Current packs/day: 1.50     Average packs/day: 1.5 packs/day for 40.0 years (60.0 ttl pk-yrs)     Types: Cigarettes    Smokeless tobacco: Never   Vaping Use    Vaping status: Never Used   Substance and Sexual Activity    Alcohol use: Yes     Alcohol/week: 4.0 standard drinks of alcohol     Types: 4 Standard drinks or equivalent per week     Comment: quart of vodka daily    Drug use: No    Sexual activity: Not Currently       Current Facility-Administered Medications:     apixaban (ELIQUIS) tablet 5 mg, BID    aspirin chewable tablet 81 mg, Daily    diltiazem (CARDIZEM) 125 mg in sodium chloride 0.9 % 125 mL infusion, Titrated, Last Rate: 7.5 mg/hr (12/08/24 0252)    diltiazem (CARDIZEM) tablet 60 mg, Q6H SEDA    ferrous sulfate tablet 325 mg, Daily With Breakfast    fluticasone-vilanterol 200-25 mcg/actuation 1 puff, Daily    folic acid (FOLVITE) tablet 1,000 mcg, Daily    gabapentin (NEURONTIN) capsule 300 mg, TID    guaiFENesin (MUCINEX) 12 hr tablet 600 mg, Q12H SEDA    guaiFENesin (ROBITUSSIN) oral liquid 200 mg, Q4H PRN    insulin lispro (HumALOG/ADMELOG) 100 units/mL subcutaneous injection 1-5 Units, 4x Daily (AC & HS)    lactated ringers infusion, Continuous, Last Rate: 100 mL/hr (12/07/24 2131)    metoprolol tartrate (LOPRESSOR) tablet 200 mg, Q12H SEDA    nicotine (NICODERM CQ) 21 mg/24 hr TD 24 hr patch 1 patch, Daily    pantoprazole (PROTONIX) EC tablet 40 mg, BID AC    ranolazine (RANEXA) 12 hr tablet 500 mg, Q12H    senna-docusate sodium (SENOKOT S) 8.6-50 mg per tablet 1 tablet, Daily PRN    sodium chloride 3 % inhalation solution 4 mL, Q8H    tamsulosin (FLOMAX) capsule 0.4 mg, Daily    thiamine tablet 100 mg,  Daily  Prior to Admission Medications   Prescriptions Last Dose Informant Patient Reported? Taking?   Blood Glucose Monitoring Suppl (ONE TOUCH ULTRA MINI) w/Device KIT Unknown Self Yes No   Sig: Use as directed   Patient not taking: Reported on 3/25/2024   Blood Pressure Monitoring (Adult Blood Pressure Cuff Lg) KIT Unknown  No No   Sig: Use in the morning   Patient not taking: Reported on 3/25/2024   Breo Ellipta 200-25 MCG/ACT inhaler Unknown Self No No   Sig: Inhale 1 puff daily Rinse mouth after use.   ONETOUCH DELICA LANCETS FINE MISC Unknown Self Yes No   Sig: 3 (three) times a day Test   Thiamine HCl (vitamin B-1) 100 MG TABS  Self No No   Sig: TAKE 1 TABLET DAILY   albuterol (Ventolin HFA) 90 mcg/act inhaler Unknown Self No No   Sig: Inhale 2 puffs every 4 (four) hours as needed for wheezing   aluminum-magnesium hydroxide-simethicone (MAALOX) 8668-4549-796 mg/30 mL suspension Unknown  No No   Sig: Take 30 mL by mouth every 4 (four) hours as needed for indigestion or heartburn   amLODIPine (NORVASC) 5 mg tablet Unknown  No No   Sig: Take 1 tablet (5 mg total) by mouth daily   apixaban (Eliquis) 5 mg Unknown  No No   Sig: TAKE 1 TABLET BY MOUTH 2 TIMES A DAY DO NOT START BEFORE JANUARY 31, 2024.   aspirin (ECOTRIN LOW STRENGTH) 81 mg EC tablet Unknown Self Yes No   Sig: Take 81 mg by mouth daily   digoxin (LANOXIN) 0.125 mg tablet Unknown  No No   Sig: Take 1 tablet (125 mcg total) by mouth daily   ferrous sulfate 325 (65 Fe) mg tablet Unknown Self No No   Sig: Take 1 tablet (325 mg total) by mouth daily with breakfast   folic acid (FOLVITE) 1 mg tablet Unknown  No No   Sig: TAKE 1 TABLET DAILY   gabapentin (NEURONTIN) 300 mg capsule Unknown  No No   Sig: TAKE ONE CAPSULE THREE TIMES DAILY   lidocaine (Lidoderm) 5 % Unknown  No No   Sig: Apply 1 patch topically over 12 hours daily Remove & Discard patch within 12 hours or as directed by MD   magnesium Oxide (MAG-OX) 400 mg TABS Unknown  No No   Sig: Take 2  tablets (800 mg total) by mouth 2 (two) times a day   metFORMIN (GLUCOPHAGE) 500 mg tablet Unknown  No No   Sig: TAKE 1 TABLET DAILY WITH BREAKFAST   metoprolol tartrate (LOPRESSOR) 100 mg tablet Unknown  No No   Sig: Take 2 tablets (200 mg total) by mouth every 12 (twelve) hours   naltrexone (REVIA) 50 mg tablet   No No   Sig: Take 1 tablet (50 mg total) by mouth daily   naproxen (Naprosyn) 500 mg tablet Unknown  No No   Sig: Take 1 tablet (500 mg total) by mouth 2 (two) times a day with meals   nicotine (NICODERM CQ) 21 mg/24 hr TD 24 hr patch Unknown Self No No   Sig: Place 1 patch on the skin over 24 hours daily Do not start before December 4, 2023.   pantoprazole (PROTONIX) 40 mg tablet Unknown  No No   Sig: TAKE 1 TABLET TWO TIMES A DAY   ranolazine (RANEXA) 500 mg 12 hr tablet Unknown  No No   Sig: TAKE 1 TABLET EVERY 12 HOURS   senna-docusate sodium (SENOKOT S) 8.6-50 mg per tablet Unknown  No No   Sig: Take 1 tablet by mouth daily as needed for constipation   sodium chloride 1 g tablet Unknown  No No   Sig: Take 2 tablets (2 g total) by mouth 2 (two) times a day with meals   tamsulosin (FLOMAX) 0.4 mg Unknown  No No   Sig: TAKE 1 CAPSULE DAILY      Facility-Administered Medications: None     Patient has no known allergies.    Objective :  Temp:  [97.6 °F (36.4 °C)-97.9 °F (36.6 °C)] 97.8 °F (36.6 °C)  HR:  [] 71  BP: (102-165)/(56-99) 128/74  Resp:  [10-20] 18  SpO2:  [83 %-98 %] 86 %  O2 Device: Nasal cannula  Nasal Cannula O2 Flow Rate (L/min):  [2 L/min] 2 L/min  Orthostatic Blood Pressures      Flowsheet Row Most Recent Value   Blood Pressure 128/74 filed at 12/08/2024 0400   Patient Position - Orthostatic VS Lying filed at 12/07/2024 1817          First Weight: Weight - Scale: 87.1 kg (192 lb 0.3 oz) (12/07/24 1545)  Vitals:    12/07/24 1545 12/07/24 2241   Weight: 87.1 kg (192 lb 0.3 oz) 87.1 kg (192 lb)       Physical Exam  Vitals and nursing note reviewed.   Constitutional:       General: He  is awake.      Appearance: He is normal weight.   HENT:      Right Ear: External ear normal.      Left Ear: External ear normal.   Eyes:      General: Scleral icterus present.         Right eye: No discharge.         Left eye: No discharge.   Cardiovascular:      Rate and Rhythm: Normal rate. Rhythm irregular.      Pulses: Normal pulses.   Pulmonary:      Effort: Pulmonary effort is normal. No respiratory distress.      Breath sounds: Normal breath sounds.   Abdominal:      General: Bowel sounds are normal. There is no distension.      Palpations: Abdomen is soft.   Musculoskeletal:      Right lower leg: No edema.      Left lower leg: No edema.   Skin:     General: Skin is warm and dry.      Capillary Refill: Capillary refill takes less than 2 seconds.   Neurological:      Mental Status: He is alert.           Lab Results: I have reviewed the following results:  Results from last 7 days   Lab Units 12/08/24  0605 12/07/24  1309   WBC Thousand/uL 7.45 5.74   HEMOGLOBIN g/dL 11.0* 11.6*   HEMATOCRIT % 34.8* 35.4*   PLATELETS Thousands/uL 159 192     Results from last 7 days   Lab Units 12/08/24  0605 12/07/24  1309   POTASSIUM mmol/L 4.7 4.6   CHLORIDE mmol/L 105 105   CO2 mmol/L 26 25   BUN mg/dL 15 16   CREATININE mg/dL 0.88 1.02   CALCIUM mg/dL 8.0* 8.3*     Results from last 7 days   Lab Units 12/07/24  1309   INR  1.07   PTT seconds 29     Lab Results   Component Value Date    HGBA1C 5.2 11/14/2024     Lab Results   Component Value Date    CKTOTAL 258 09/14/2024    CKMB 2.0 04/22/2021    CKMBINDEX <1.0 04/22/2021    TROPONINI <0.02 06/25/2021       EKG demonstrates atrial flutter and rate of 101      VTE Prophylaxis: VTE covered by:  apixaban, Oral, 5 mg at 12/07/24 2049       Abigail TRAN  Cardiology

## 2024-12-08 NOTE — PROGRESS NOTES
Progress Note - Family Medicine   Name: Gregg Partida 64 y.o. male I MRN: [id number]  Unit/Bed#: 4 [place] 415-01 I Date of Admission: 12/7/2024   Date of Service: 12/8/2024 I Hospital Day: 0     Assessment & Plan  Atrial fibrillation with RVR (HCC)  Pt presented to ED w/ chest pain, found to be in aflutter with RVR. Recurrent admissions with similar complaint, recently d/c to home with digoxin, metoprolol, eliquis, Pt states that Pt didn't take one day before admission     -dig <0.3   -D dimer 2.16  -CTA PE: no PE    Plan:  Cardio consulted  -on cardizem gtt now, switching over to PO Cardizem 60mg Q6hrs   -S/P IV dig loading 250 mcg   -hold home PO digoxin for now    -Resume home Eliquis   -Resume home metoprolol   Alcohol abuse  Extensive hx of alcohol abuse with frequent admissions, declining IP detox program. Endorses last drink on same day, half bottle of vodka, on admission day, no PO intake for 3 days before admission. Noncompliant with home medications include folate 1000 mcg daily and thiamine 100 mg daily.    -Ethanol 428   -lipase 87    Continue home medications  CIWA protocol  Seizure precautions    Encourage alcohol cessation as able   Chronic heart failure with preserved ejection fraction (HCC)  Wt Readings from Last 3 Encounters:   12/07/24 87.1 kg (192 lb)   11/28/24 83 kg (182 lb 15.7 oz)   11/25/24 81.2 kg (179 lb)     Chronic, hypovolemic on examination   Echo (8/2024): EF 50%, PAP 45 to 50       Plan:  -Gentle IVF in setting of acidosis and CHF  -I&O's, daily wt   -Consider lasix if evidence of volume overload   -Resume home ranexa, metoprolol  Acute respiratory failure with hypoxia (HCC)  Most likely 2/2 mucus plugging, baseline no oxygen at home    CT chest PE: No pulmonary embolism. Left lower lobe bronchitis with mucous plugging and mucus/debris extending from the left mainstem bronchus into the left lower lobe bronchus with occlusion.    Pt required 2 L NC POA  Increase to 5L  "today    Plan:  -Respiratory clearance, airway clearance  -Chest PT oscillator  -Mucinex  -Xopenex TID    -3% Saline nebs  - consider lasix if oxygen requirement does not decrease with treatments   Elevated troponin  Trop 116>107  Most likely non-ischemic trop elevation 2/2 afib RVR    Pt is chest pian free currently    - Telemetry  - monitor Chest pain  - rest of A/P as per \"Afib with RVR\"  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 disease (HCC)  Lab Results   Component Value Date    HGBA1C 5.2 11/14/2024     Well controlled, Home meds include metformin 500 mg     Plan:  -Hold PO agents, insulin sliding scale with accuchecks  -hypoglycemia protocol   Sleep apnea  Known overnight hypoxia in setting of sleep apnea, noncompliant with CPAP. Overnight, declined CPAP or BIPAP, Pt removed NC O2 overnight even with hypoxia.   Ambulatory dysfunction  Chronic with history of several falls in setting of chronic alcohol abuse.  Denies any new falls since last discharge.  Hematomas noted diffusely on arms with different stages of healing. PT/OT refused on last admission.   PT/OT eval  Iron deficiency  Chronic, continue home PO iron  BPH (benign prostatic hyperplasia)  Continue flomax, monitor I&O's, urinary retention protocol     VTE Pharmacologic Prophylaxis: VTE Score: 5 High Risk (Score >/= 5) - Pharmacological DVT Prophylaxis Ordered: apixaban (Eliquis). Sequential Compression Devices Ordered.    Mobility:   Basic Mobility Inpatient Raw Score: 18  JH-HLM Goal: 6: Walk 10 steps or more  JH-HLM Achieved: 6: Walk 10 steps or more  JH-HLM Goal achieved. Continue to encourage appropriate mobility.    Patient Centered Rounds: I performed bedside rounds with nursing staff today.   Discussions with Specialists or Other Care Team Provider: Cardio    Education and Discussions with Family / Patient: Patient declined call to .     Current Length of Stay: 0 day(s)  Current Patient Status: Observation "   Certification Statement: The patient will continue to require additional inpatient hospital stay due to Afib RVR needing IV cardizem drip and alcohol withdrawal  Discharge Plan: Anticipate discharge in 24-48 hrs to discharge location to be determined pending rehab evaluations.    Code Status: Level 1 - Full Code    Subjective   Pt was seen and examined at the bedside, NAD. Pt states that Pt no longer has chest pain and Pt did not have any other acute complaints. No signs of alcohol withdraw currently. Pt has good BM yesterday and urinating well.      Objective :  Temp:  [97.6 °F (36.4 °C)-98.5 °F (36.9 °C)] 98.5 °F (36.9 °C)  HR:  [] 71  BP: (102-165)/(56-99) 133/75  Resp:  [10-22] 22  SpO2:  [83 %-98 %] 91 %  O2 Device: Nasal cannula  Nasal Cannula O2 Flow Rate (L/min):  [2 L/min-5 L/min] 5 L/min    Body mass index is 24.65 kg/m².     Input and Output Summary (last 24 hours):     Intake/Output Summary (Last 24 hours) at 12/8/2024 0821  Last data filed at 12/8/2024 0501  Gross per 24 hour   Intake 2050 ml   Output 1150 ml   Net 900 ml       Physical Exam  Vitals reviewed.   Constitutional:       General: He is not in acute distress.     Appearance: Normal appearance. He is not ill-appearing.   HENT:      Head: Normocephalic and atraumatic.      Right Ear: External ear normal.      Left Ear: External ear normal.      Nose: Nose normal. No congestion or rhinorrhea.      Mouth/Throat:      Mouth: Mucous membranes are moist.      Pharynx: Oropharynx is clear. No oropharyngeal exudate or posterior oropharyngeal erythema.   Eyes:      General:         Right eye: No discharge.         Left eye: No discharge.      Conjunctiva/sclera: Conjunctivae normal.   Cardiovascular:      Rate and Rhythm: Normal rate. Rhythm irregular.      Pulses: Normal pulses.      Heart sounds: Normal heart sounds. No murmur heard.     No friction rub. No gallop.   Pulmonary:      Effort: Pulmonary effort is normal. No respiratory distress.       Breath sounds: No stridor. Rhonchi (brochi sound referred from upper airway due to mucus) present. No wheezing or rales.   Chest:      Chest wall: No tenderness.   Abdominal:      General: Abdomen is flat. Bowel sounds are normal. There is no distension.      Palpations: Abdomen is soft.      Tenderness: There is no abdominal tenderness. There is no guarding.   Musculoskeletal:         General: No swelling, tenderness, deformity or signs of injury. Normal range of motion.      Cervical back: Normal range of motion and neck supple. No rigidity or tenderness.      Right lower leg: No edema.      Left lower leg: No edema.   Lymphadenopathy:      Cervical: No cervical adenopathy.   Skin:     General: Skin is warm and dry.      Capillary Refill: Capillary refill takes less than 2 seconds.      Findings: No bruising, erythema, lesion or rash.   Neurological:      General: No focal deficit present.      Mental Status: He is alert and oriented to person, place, and time. Mental status is at baseline.      Motor: No weakness.      Gait: Gait normal.      Deep Tendon Reflexes: Reflexes normal.   Psychiatric:         Mood and Affect: Mood normal.         Behavior: Behavior normal.         Thought Content: Thought content normal.           Lines/Drains:        Telemetry:  Telemetry Orders (From admission, onward)               24 Hour Telemetry Monitoring  Continuous x 24 Hours (Telem)        Comments: Initiate telemetry after reviewing the following labs (CBC, CMP, Mg, Serum ETOH) for abnormal electrolytes and measure QT on EKG for QT prolongation   Question:  Reason for 24 Hour Telemetry  Answer:  Arrhythmias requiring acute medical intervention / PPM or ICD malfunction                     Telemetry Reviewed: Atrial fibrillation. HR averaging 70-80  Indication for Continued Telemetry Use: Arrthymias requiring medical therapy               Lab Results: I have reviewed the following results:   Results from last 7 days    Lab Units 12/08/24  0605   WBC Thousand/uL 7.45   HEMOGLOBIN g/dL 11.0*   HEMATOCRIT % 34.8*   PLATELETS Thousands/uL 159   SEGS PCT % 75   LYMPHO PCT % 13*   MONO PCT % 9   EOS PCT % 2     Results from last 7 days   Lab Units 12/08/24  0605   SODIUM mmol/L 140   POTASSIUM mmol/L 4.7   CHLORIDE mmol/L 105   CO2 mmol/L 26   BUN mg/dL 15   CREATININE mg/dL 0.88   ANION GAP mmol/L 9   CALCIUM mg/dL 8.0*   ALBUMIN g/dL 3.6   TOTAL BILIRUBIN mg/dL 0.76   ALK PHOS U/L 112*   ALT U/L 28   AST U/L 35   GLUCOSE RANDOM mg/dL 122     Results from last 7 days   Lab Units 12/07/24  1309   INR  1.07     Results from last 7 days   Lab Units 12/08/24  0714 12/07/24  2132   POC GLUCOSE mg/dl 116 108               Recent Cultures (last 7 days):         Imaging Results Review: I reviewed radiology reports from this admission including: chest xray and CT chest.  Other Study Results Review: EKG was reviewed.     Last 24 Hours Medication List:     Current Facility-Administered Medications:     apixaban (ELIQUIS) tablet 5 mg, BID    aspirin chewable tablet 81 mg, Daily    diltiazem (CARDIZEM) 125 mg in sodium chloride 0.9 % 125 mL infusion, Titrated, Last Rate: 7.5 mg/hr (12/08/24 0252)    diltiazem (CARDIZEM) tablet 60 mg, Q6H SEDA    ferrous sulfate tablet 325 mg, Daily With Breakfast    fluticasone-vilanterol 200-25 mcg/actuation 1 puff, Daily    folic acid (FOLVITE) tablet 1,000 mcg, Daily    gabapentin (NEURONTIN) capsule 300 mg, TID    guaiFENesin (MUCINEX) 12 hr tablet 600 mg, Q12H SEDA    guaiFENesin (ROBITUSSIN) oral liquid 200 mg, Q4H PRN    insulin lispro (HumALOG/ADMELOG) 100 units/mL subcutaneous injection 1-5 Units, 4x Daily (AC & HS)    levalbuterol (XOPENEX) inhalation solution 0.63 mg, TID    magnesium sulfate 4 g/100 mL IVPB (premix) 4 g, Once, Last Rate: 4 g (12/08/24 0744)    metoprolol tartrate (LOPRESSOR) tablet 200 mg, Q12H SEDA    nicotine (NICODERM CQ) 21 mg/24 hr TD 24 hr patch 1 patch, Daily    pantoprazole  (PROTONIX) EC tablet 40 mg, BID AC    ranolazine (RANEXA) 12 hr tablet 500 mg, Q12H    senna-docusate sodium (SENOKOT S) 8.6-50 mg per tablet 1 tablet, Daily PRN    sodium chloride 3 % inhalation solution 4 mL, Q8H    tamsulosin (FLOMAX) capsule 0.4 mg, Daily    thiamine tablet 100 mg, Daily    Administrative Statements   Today, Patient Was Seen By: Pablito Presley MD      **Please Note: This note may have been constructed using a voice recognition system.**

## 2024-12-08 NOTE — NURSING NOTE
After evaluation by ICU, Marychuy Palacio patient being transferred to ICU for increasing oxygen requirements and work of breathing. Patient transferred on bed w/ portable oxygen and cardiac monitoring. Report given to nurse, Mariano lieberman. All belongings delivered with patient.

## 2024-12-08 NOTE — ASSESSMENT & PLAN NOTE
Likely multifactorial due to mucous plugging, difficulty clearing secretions, poor inspiratory effort, underlying COPD and possibly an element of pulmonary edema.  Encourage incentive spirometry and flutter device.  Titrate O2 to maintain saturations above 88%.  Consider trial of diuretics if no improvement.

## 2024-12-08 NOTE — ASSESSMENT & PLAN NOTE
Chronic with history of several falls in setting of chronic alcohol abuse.  Denies any new falls since last discharge.  Hematomas noted diffusely on arms with different stages of healing. PT/OT refused on last admission.   PT/OT audra

## 2024-12-08 NOTE — PLAN OF CARE
Problem: Potential for Falls  Goal: Patient will remain free of falls  Description: INTERVENTIONS:  - Educate patient/family on patient safety including physical limitations  - Instruct patient to call for assistance with activity   - Consult OT/PT to assist with strengthening/mobility   - Keep Call bell within reach  - Keep bed low and locked with side rails adjusted as appropriate  - Keep care items and personal belongings within reach  - Initiate and maintain comfort rounds  - Make Fall Risk Sign visible to staff  - Offer Toileting every 2 Hours, in advance of need  - Initiate/Maintain bed alarm  - Apply yellow socks and bracelet for high fall risk patients  - Consider moving patient to room near nurses station  Outcome: Progressing     Problem: PAIN - ADULT  Goal: Verbalizes/displays adequate comfort level or baseline comfort level  Description: Interventions:  - Encourage patient to monitor pain and request assistance  - Assess pain using appropriate pain scale  - Administer analgesics based on type and severity of pain and evaluate response  - Implement non-pharmacological measures as appropriate and evaluate response  - Consider cultural and social influences on pain and pain management  - Notify physician/advanced practitioner if interventions unsuccessful or patient reports new pain  Outcome: Progressing     Problem: INFECTION - ADULT  Goal: Absence or prevention of progression during hospitalization  Description: INTERVENTIONS:  - Assess and monitor for signs and symptoms of infection  - Monitor lab/diagnostic results  - Monitor all insertion sites, i.e. indwelling lines, tubes, and drains  - Monitor endotracheal if appropriate and nasal secretions for changes in amount and color  - Thida appropriate cooling/warming therapies per order  - Administer medications as ordered  - Instruct and encourage patient and family to use good hand hygiene technique  - Identify and instruct in appropriate isolation  precautions for identified infection/condition  Outcome: Progressing  Goal: Absence of fever/infection during neutropenic period  Description: INTERVENTIONS:  - Monitor WBC    Outcome: Progressing     Problem: SAFETY ADULT  Goal: Patient will remain free of falls  Description: INTERVENTIONS:  - Educate patient/family on patient safety including physical limitations  - Instruct patient to call for assistance with activity   - Consult OT/PT to assist with strengthening/mobility   - Keep Call bell within reach  - Keep bed low and locked with side rails adjusted as appropriate  - Keep care items and personal belongings within reach  - Initiate and maintain comfort rounds  - Make Fall Risk Sign visible to staff  - Offer Toileting every 2 Hours, in advance of need  - Initiate/Maintain bed alarm  - Apply yellow socks and bracelet for high fall risk patients  - Consider moving patient to room near nurses station  Outcome: Progressing  Goal: Maintain or return to baseline ADL function  Description: INTERVENTIONS:  -  Assess patient's ability to carry out ADLs; assess patient's baseline for ADL function and identify physical deficits which impact ability to perform ADLs (bathing, care of mouth/teeth, toileting, grooming, dressing, etc.)  - Assess/evaluate cause of self-care deficits   - Assess range of motion  - Assess patient's mobility; develop plan if impaired  - Assess patient's need for assistive devices and provide as appropriate  - Encourage maximum independence but intervene and supervise when necessary  - Involve family in performance of ADLs  - Assess for home care needs following discharge   - Consider OT consult to assist with ADL evaluation and planning for discharge  - Provide patient education as appropriate  Outcome: Progressing  Goal: Maintains/Returns to pre admission functional level  Description: INTERVENTIONS:  - Perform AM-PAC 6 Click Basic Mobility/ Daily Activity assessment daily.  - Set and  communicate daily mobility goal to care team and patient/family/caregiver.   - Collaborate with rehabilitation services on mobility goals if consulted  - Perform Range of Motion 2 times a day.  - Reposition patient every 2 hours.  - Dangle patient 2 times a day  - Stand patient 2 times a day  - Ambulate patient 2 times a day  - Out of bed to chair 2 times a day   - Out of bed for meals 2 times a day  - Out of bed for toileting  - Record patient progress and toleration of activity level   Outcome: Progressing     Problem: DISCHARGE PLANNING  Goal: Discharge to home or other facility with appropriate resources  Description: INTERVENTIONS:  - Identify barriers to discharge w/patient and caregiver  - Arrange for needed discharge resources and transportation as appropriate  - Identify discharge learning needs (meds, wound care, etc.)  - Arrange for interpretive services to assist at discharge as needed  - Refer to Case Management Department for coordinating discharge planning if the patient needs post-hospital services based on physician/advanced practitioner order or complex needs related to functional status, cognitive ability, or social support system  Outcome: Progressing     Problem: Knowledge Deficit  Goal: Patient/family/caregiver demonstrates understanding of disease process, treatment plan, medications, and discharge instructions  Description: Complete learning assessment and assess knowledge base.  Interventions:  - Provide teaching at level of understanding  - Provide teaching via preferred learning methods  Outcome: Progressing     Problem: RESPIRATORY - ADULT  Goal: Achieves optimal ventilation and oxygenation  Description: INTERVENTIONS:  - Assess for changes in respiratory status  - Assess for changes in mentation and behavior  - Position to facilitate oxygenation and minimize respiratory effort  - Oxygen administered by appropriate delivery if ordered  - Initiate smoking cessation education as  indicated  - Encourage broncho-pulmonary hygiene including cough, deep breathe, Incentive Spirometry  - Assess the need for suctioning and aspirate as needed  - Assess and instruct to report SOB or any respiratory difficulty  - Respiratory Therapy support as indicated  Outcome: Progressing

## 2024-12-08 NOTE — CONSULTS
Consultation - Critical Care/ICU   Name: Gregg Partida 64 y.o. male I MRN: [id number]  Unit/Bed#: [address] [place] [address] I Date of Admission: 12/7/2024   Date of Service: 12/8/2024 I Hospital Day: 0   Consults  Physician Requesting Evaluation: Lashon Coyne DO   Reason for Evaluation / Principal Problem: Acute hypoxic respiratory failure        Assessment & Plan  Acute respiratory failure with hypoxia (HCC)  Patient presented 12/7 with chest pain secondary to A-fib RVR  He was admitted to family medicine, required 2 L nasal cannula on admission  Escalating oxygen requirements today up to 15 L mid flow nasal cannula  12/7: CTA PE study revealed: Left lower lobe bronchitis with mucous plugging and mucus/debris extending from the left mainstem bronchus into the left lower lobe bronchus with occlusion   12/8: CXR revealed central congestion without acute consolidation    Plan:   Start high flow nasal cannula  Administer 40 mg Lasix now  Continue airway clearance  Mucinex  Nebs  Atrial fibrillation (HCC)  Presented in RVR, now rate controlled  Outpatient regimen consist of Eliquis, digoxin, metoprolol  Patient was started on Cardizem by cardiology  Continue metoprolol tartrate 200 mg twice daily, Cardizem 60 mg every 6, and Eliquis  Chronic heart failure with preserved ejection fraction (HCC)  Wt Readings from Last 3 Encounters:   12/07/24 87.1 kg (192 lb)   11/28/24 83 kg (182 lb 15.7 oz)   11/25/24 81.2 kg (179 lb)   Weight gain as outlined above  40 mg Lasix now  I&O's, daily weights  COPD, severity to be determined (HCC)  No PFTs on file  Plan as outlined above  Hypertension  Outpatient regimen consist of: amlodipine  Consider resuming  Alcohol abuse  CIWA monitoring  Administer phenobarb if needed  Mucus plugging of bronchi  Airway clearance protocol  Rest of plan as outlined above  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 disease (HCC)  Hold oral diabetic agents  Continue sliding scale insulin with  Accu-Cheks  Elevated troponin  High-sensitivity troponin 116 > 107 on admission  Likely secondary to A-fib RVR  Cardiology was consulted on admission  Ambulatory dysfunction  PT/OT when appropriate  BPH (benign prostatic hyperplasia)  Flomax  Disposition: Stepdown Level 1    History of Present Illness   Gregg Partida is a 64 y.o. who presented 12/7 with chest pain, found to be in A-fib RVR.  Patient has a past medical history of A-fib on Eliquis, diabetes, alcohol abuse, hypertension, and BPH.    Patient was admitted to [facility] on a Cardizem infusion.  Rate control was obtained, but patient developed worsening hypoxic respiratory failure.  Continuous IV fluids were discontinued and pulmonary was consulted this afternoon.      Patient escalated to 15 L mid flow nasal cannula and was therefore transferred to ICU for higher level of care and high flow nasal cannula.     History obtained from chart review and the patient.  Review of Systems: Review of Systems   Respiratory:  Positive for shortness of breath. Negative for cough.    All other systems reviewed and are negative.      Historical Information   Past Medical History:  No date: Acute on chronic kidney failure  (HCC)  06/07/2019: Alcohol withdrawal (HCC)  No date: Atrial fibrillation (HCC)  No date: Cancer ([facility])      Comment:  prostate ca,had radiation  No date: Cardiac disease      Comment:  stents,then triple bypass  No date: COPD (chronic obstructive pulmonary disease) ([facility])  No date: Coronary artery disease  No date: ETOH abuse  No date: Heart failure (HCC)  No date: History of heart surgery      Comment:  says [facility]  No date: Hx of heart artery stent      Comment:  2014  No date: Hyperlipidemia  No date: Hypertension  10/17/2019: Hyponatremia  12/22/2019: Hypovolemic shock (HCC)  10/13/2022: Lumbar spondylitis (HCC)  04/21/2021: Metabolic acidosis, increased anion gap  10/10/2022: Nasal bone fracture  No date: Prostate CA  (HCC)  No date: S/P CABG x 3      Comment:  2004  No date: Sleep apnea Past Surgical History:  No date: CARDIAC CATHETERIZATION      Comment:  2 stents  2004: CORONARY ARTERY BYPASS GRAFT  12/16/2020: CT ARTHRD ANT INTERBODY MIN DSC CRV BELOW C2; N/A      Comment:  Procedure: Anterior cervical discectomy with fusion                C4-C7; Posterior cervical decompression and fusion C2-T2;               Surgeon: David Rowell MD;  Location: [facility];                 Service: Neurosurgery  No date: TONSILLECTOMY   Current Outpatient Medications   Medication Instructions    albuterol (Ventolin HFA) 90 mcg/act inhaler 2 puffs, Inhalation, Every 4 hours PRN    aluminum-magnesium hydroxide-simethicone (MAALOX) 8995-5573-286 mg/30 mL suspension 30 mL, Oral, Every 4 hours PRN    amLODIPine (NORVASC) 5 mg, Oral, Daily    apixaban (Eliquis) 5 mg TAKE 1 TABLET BY MOUTH 2 TIMES A DAY DO NOT START BEFORE JANUARY 31, 2024.    aspirin (ECOTRIN LOW STRENGTH) 81 mg, Oral, Daily    Blood Glucose Monitoring Suppl (ONE TOUCH ULTRA MINI) w/Device KIT Use as directed    Blood Pressure Monitoring (Adult Blood Pressure Cuff Lg) KIT Does not apply, Daily    Breo Ellipta 200-25 MCG/ACT inhaler 1 puff, Inhalation, Daily, Rinse mouth after use.    digoxin (LANOXIN) 125 mcg, Oral, Daily    ferrous sulfate 325 mg, Oral, Daily with breakfast    folic acid (FOLVITE) 1 mg tablet TAKE 1 TABLET DAILY    gabapentin (NEURONTIN) 300 mg capsule TAKE ONE CAPSULE THREE TIMES DAILY    lidocaine (Lidoderm) 5 % 1 patch, Topical, Daily, Remove & Discard patch within 12 hours or as directed by MD    magnesium Oxide (MAG-OX) 800 mg, Oral, 2 times daily    metFORMIN (GLUCOPHAGE) 500 mg, Daily with breakfast    metoprolol tartrate (LOPRESSOR) 200 mg, Oral, Every 12 hours scheduled    naltrexone (REVIA) 50 mg, Oral, Daily    naproxen (NAPROSYN) 500 mg, Oral, 2 times daily with meals    nicotine (NICODERM CQ) 21 mg/24 hr TD 24 hr patch 1 patch, Transdermal, Daily     ONETOUCH DELICA LANCETS FINE MISC 3 times daily, Test    pantoprazole (PROTONIX) 40 mg tablet TAKE 1 TABLET TWO TIMES A DAY    ranolazine (RANEXA) 500 mg 12 hr tablet TAKE 1 TABLET EVERY 12 HOURS    senna-docusate sodium (SENOKOT S) 8.6-50 mg per tablet 1 tablet, Oral, Daily PRN    sodium chloride 2 g, Oral, 2 times daily with meals    tamsulosin (FLOMAX) 0.4 mg TAKE 1 CAPSULE DAILY    Thiamine HCl (vitamin B-1) 100 MG TABS TAKE 1 TABLET DAILY    No Known Allergies   Social History     Tobacco Use    Smoking status: Every Day     Current packs/day: 1.50     Average packs/day: 1.5 packs/day for 40.0 years (60.0 ttl pk-yrs)     Types: Cigarettes    Smokeless tobacco: Never   Vaping Use    Vaping status: Never Used   Substance Use Topics    Alcohol use: Yes     Alcohol/week: 4.0 standard drinks of alcohol     Types: 4 Standard drinks or equivalent per week     Comment: quart of vodka daily    Drug use: No    Family History   Problem Relation Age of Onset    Diabetes Mother     Uterine cancer Mother     COPD Father     Hypertension Father           Objective :                   Vitals I/O      Most Recent Min/Max in 24hrs   Temp 98.6 °F (37 °C) Temp  Min: 97.6 °F (36.4 °C)  Max: 98.6 °F (37 °C)   Pulse 72 Pulse  Min: 71  Max: 153   Resp (!) 26 Resp  Min: 14  Max: 26   /99 BP  Min: 102/57  Max: 165/99   O2 Sat 93 % SpO2  Min: 83 %  Max: 99 %      Intake/Output Summary (Last 24 hours) at 12/8/2024 1655  Last data filed at 12/8/2024 1643  Gross per 24 hour   Intake 2456 ml   Output 1900 ml   Net 556 ml       Diet Cardiovascular; Sodium 4 Gm (WALTER)    Invasive Monitoring           Physical Exam   Physical Exam  Eyes:      Pupils: Pupils are equal, round, and reactive to light.   Skin:     General: Skin is warm and dry.   HENT:      Nose: No congestion.      Mouth/Throat:      Mouth: Mucous membranes are moist.   Cardiovascular:      Rate and Rhythm: Normal rate. Rhythm irregular.   Musculoskeletal:         General:  Normal range of motion.      Right lower leg: Edema present.      Left lower leg: Edema present.   Abdominal:      Palpations: Abdomen is soft.   Constitutional:       General: He is not in acute distress.     Appearance: He is well-developed. He is ill-appearing. He is not toxic-appearing.   Pulmonary:      Effort: Tachypnea present.      Breath sounds: Rales present. No wheezing or rhonchi.   Neurological:      Mental Status: He is alert and oriented to person, place and time.          Diagnostic Studies        Lab Results: I have reviewed the following results:     Medications:  Scheduled PRN   apixaban, 5 mg, BID  aspirin, 81 mg, Daily  diltiazem, 60 mg, Q6H SEDA  ferrous sulfate, 325 mg, Daily With Breakfast  fluticasone-vilanterol, 1 puff, Daily  folic acid, 1,000 mcg, Daily  gabapentin, 300 mg, TID  guaiFENesin, 600 mg, Q12H SEDA  insulin lispro, 1-5 Units, 4x Daily (AC & HS)  levalbuterol, 0.63 mg, TID  metoprolol tartrate, 200 mg, Q12H SEDA  nicotine, 1 patch, Daily  pantoprazole, 40 mg, BID AC  ranolazine, 500 mg, Q12H  sodium chloride, 4 mL, TID  tamsulosin, 0.4 mg, Daily  vitamin B-1, 100 mg, Daily      guaiFENesin, 200 mg, Q4H PRN  levalbuterol, 0.63 mg, Q4H PRN  senna-docusate sodium, 1 tablet, Daily PRN       Continuous          Labs:   CBC    Recent Labs     12/07/24  1309 12/08/24  0605   WBC 5.74 7.45   HGB 11.6* 11.0*   HCT 35.4* 34.8*    159     BMP    Recent Labs     12/07/24  1309 12/08/24  0605   SODIUM 144 140   K 4.6 4.7    105   CO2 25 26   AGAP 14* 9   BUN 16 15   CREATININE 1.02 0.88   CALCIUM 8.3* 8.0*       Coags    Recent Labs     12/07/24  1309   INR 1.07   PTT 29        Additional Electrolytes  Recent Labs     12/07/24  1309 12/08/24  0605   MG 1.6* 1.6*          Blood Gas    No recent results  No recent results LFTs  Recent Labs     12/07/24  1309 12/08/24  0605   ALT 31 28   AST 46* 35   ALKPHOS 108* 112*   ALB 3.6 3.6   TBILI 0.51 0.76       Infectious  No recent results   Glucose  Recent Labs     12/07/24  1309 12/08/24  0605   GLUC 77 122

## 2024-12-08 NOTE — ASSESSMENT & PLAN NOTE
Wt Readings from Last 3 Encounters:   12/07/24 87.1 kg (192 lb)   11/28/24 83 kg (182 lb 15.7 oz)   11/25/24 81.2 kg (179 lb)   8/16/2024 TTE: EF visibly estimated 65% with severe left atrial dilatation    shortness of breath most likely due to mucus plugging  monitor I and O, daily weights and labs  CHF education

## 2024-12-08 NOTE — ASSESSMENT & PLAN NOTE
Chest CT shows mucous plugging in left lower lobe without significant atelectasis.  There would be low yield for bronchoscopy.  Would optimize noninvasive means of pulmonary toilet including vest therapy, flutter device and incentive spirometry.

## 2024-12-08 NOTE — ASSESSMENT & PLAN NOTE
Extensive hx of alcohol abuse with frequent admissions, declining IP detox program. Endorses last drink on same day, half bottle of vodka, on admission day, no PO intake for 3 days before admission. Noncompliant with home medications include folate 1000 mcg daily and thiamine 100 mg daily.    -Ethanol 428   -lipase 87    Continue home medications  [facility] protocol  Seizure precautions    Encourage alcohol cessation as able

## 2024-12-08 NOTE — ASSESSMENT & PLAN NOTE
Wt Readings from Last 3 Encounters:   12/07/24 87.1 kg (192 lb)   11/28/24 83 kg (182 lb 15.7 oz)   11/25/24 81.2 kg (179 lb)     Chronic, hypovolemic on examination   Echo (8/2024): EF 50%, PAP 45 to 50       Plan:  -Gentle IVF in setting of acidosis and CHF  -I&O's, daily wt   -Consider CXR and lasix if evidence of volume overload   -Resume home ranexa, metoprolol

## 2024-12-08 NOTE — PHYSICAL THERAPY NOTE
PT cancellation     12/08/24 1245   PT Last Visit   PT Visit Date 12/08/24   Note Type   Note type Cancelled Session   Additional Comments PT eval deferred by RN due to worsening respiratory status.  Now on 12 L high flow via NC.   Will follow as appropriate for PT evaluation.   Licensure   [place] License Number  Nereyda Gutierrez PT  [id number]

## 2024-12-08 NOTE — ASSESSMENT & PLAN NOTE
Patient has emphysema on imaging and has longstanding smoking history.  No PFTs on file.  Currently on Breo Ellipta.  Would benefit from outpatient PFTs to assess severity of his underlying lung disease and optimize maintenance inhaler therapy.

## 2024-12-08 NOTE — ASSESSMENT & PLAN NOTE
Pt presented to ED w/ chest pain, found to be in aflutter with RVR. Recurrent admissions with similar complaint, recently d/c to home with digoxin, metoprolol, eliquis     -dig <0.3   -D dimer 2.16  -CTA PE: no PE    Plan:  -Continue cardizem gtt   -Continue dig loading as able    -Resume home Eliquis   -Resume home metoprolol

## 2024-12-08 NOTE — ASSESSMENT & PLAN NOTE
Known overnight hypoxia in setting of sleep apnea, noncompliant with CPAP. Overnight, declined CPAP or BIPAP, removed O2 overnight with hypoxia.

## 2024-12-08 NOTE — ASSESSMENT & PLAN NOTE
Presented in RVR, now rate controlled  Outpatient regimen consist of Eliquis, digoxin, metoprolol  Patient was started on Cardizem by cardiology  Continue metoprolol tartrate 200 mg twice daily, Cardizem 60 mg every 6, and Eliquis

## 2024-12-08 NOTE — ASSESSMENT & PLAN NOTE
Lab Results   Component Value Date    HGBA1C 5.2 11/14/2024     Home meds include metformin 500 mg     Plan:  -Hold PO agents, insulin sliding scale  -Carb controlled diet

## 2024-12-08 NOTE — H&P
H&P - [facility] Medicine   Name: Gregg Partida 64 y.o. male I MRN: [id number]  Unit/Bed#: 4 [address]-01 I Date of Admission: 12/7/2024   Date of Service: 12/8/2024 I Hospital Day: 0     Assessment & Plan  Atrial fibrillation with RVR (HCC)  Pt presented to ED w/ chest pain, found to be in aflutter with RVR. Recurrent admissions with similar complaint, recently d/c to home with digoxin, metoprolol, eliquis     -dig <0.3   -D dimer 2.16  -CTA PE: no PE    Plan:  -Continue cardizem gtt   -Continue dig loading as able    -Resume home Eliquis   -Resume home metoprolol   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 disease (HCC)  Lab Results   Component Value Date    HGBA1C 5.2 11/14/2024     Home meds include metformin 500 mg     Plan:  -Hold PO agents, insulin sliding scale  -Carb controlled diet   Alcohol abuse  Extensive hx of alcohol abuse with frequent admissions, declining IP detox program. Endorses last drink on same day as admission, no PO intake for 3 days before admission. Noncompliant with home medications include 1000 mcg daily and thiamine 100 mg daily.    -Ethanol 428   -lipase 87    Continue home medications  CIWA protocol initiated during hospital stay   Seizure precautions    Encourage alcohol cessation as able   Chronic heart failure with preserved ejection fraction (HCC)  Wt Readings from Last 3 Encounters:   12/07/24 87.1 kg (192 lb)   11/28/24 83 kg (182 lb 15.7 oz)   11/25/24 81.2 kg (179 lb)     Chronic, hypovolemic on examination   Echo (8/2024): EF 50%, PAP 45 to 50       Plan:  -Gentle IVF in setting of acidosis and CHF  -I&O's, daily wt   -Consider CXR and lasix if evidence of volume overload   -Resume home ranexa, metoprolol  Sleep apnea  Known overnight hypoxia in setting of sleep apnea, noncompliant with CPAP. Overnight, declined CPAP or BIPAP, removed O2 overnight with hypoxia.   Ambulatory dysfunction  Chronic with history of several falls in setting of chronic alcohol  abuse.  Denies any new falls since last discharge.  Hematomas noted diffusely on arms with different stages of healing. PT/OT refused on last admission.   PT/OT eval  Iron deficiency  Chronic, continue home PO iron  BPH (benign prostatic hyperplasia)  Continue flomax, monitor I&O's, urinary retention protocol   Mucus plugging of bronchi  Endorses dry cough    CT chest PE: No pulmonary embolism. Left lower lobe bronchitis with mucous plugging and mucus/debris extending from the left mainstem bronchus into the left lower lobe bronchus with occlusion.    Plan:  -Respiratory clearance, airway clearance  -Chest PT oscillator  -Saline nebs PRN    Code Status: Level 1 - Full Code  Admission Status: INPATIENT   Disposition: Patient requires Med Surg    Admit to team: [place] Resident Team    History of Present Illness   64 year old male with past medical history of alcohol abuse, CHF, A-fib/a flutter, hypertension, COPD, diabetes mellitus type 2, hyponatremia, BPH, nicotine dependence presented to ED with chest pain started 4 hours prior to presentation to ED. Pain is nonradiating and localized to the left side of upper chest.  He reports worsening associated palpitations with the chest pain.  Denies shortness of breath or change in mental status.  Reports to have not eaten in the past 3 days with only drinking vodka.  Last drink couple hours prior to admission.  In the ED patient was noted to be tachycardic and in atrial flutter with with RVR.  No cyanosis or edema noticed.  He admits to being noncompliant with medications from last discharge confirmed with his low digoxin level on admission.    ED course: Cardizem 125 mg infusion , magnesium sulfate 2g IV, NS bolus    Review of Systems   Constitutional:  Negative for chills and fever.   HENT:  Negative for ear pain and sore throat.    Eyes:  Negative for pain and visual disturbance.   Respiratory:  Negative for cough and shortness of breath.    Cardiovascular:  Positive for  chest pain and palpitations.   Gastrointestinal:  Negative for abdominal pain, constipation, diarrhea, nausea and vomiting.   Endocrine: Negative for cold intolerance and heat intolerance.   Genitourinary:  Negative for difficulty urinating and dysuria.   Musculoskeletal:  Negative for joint swelling.   Skin:  Negative for rash.   Neurological:  Negative for dizziness, tremors, seizures, weakness and headaches.   Psychiatric/Behavioral:  Negative for behavioral problems and confusion.      I have reviewed the patient's PMH, PSH, Social History, Family History, Meds, and Allergies    Objective :  Temp:  [97.6 °F (36.4 °C)-97.9 °F (36.6 °C)] 97.8 °F (36.6 °C)  HR:  [] 71  BP: (102-165)/(56-99) 128/74  Resp:  [10-20] 18  SpO2:  [83 %-98 %] 86 %  O2 Device: Nasal cannula  Nasal Cannula O2 Flow Rate (L/min):  [2 L/min] 2 L/min    Intake & Output:  I/O         12/06 0701  12/07 0700 12/07 0701 12/08 0700    IV Piggyback  2050    Total Intake(mL/kg)  2050 (23.5)    Urine (mL/kg/hr)  200    Total Output  200    Net  +1850                Weights:   IBW (Ideal Body Weight): 82.2 kg    Body mass index is 24.65 kg/m².  Weight (last 2 days)       Date/Time Weight    12/07/24 22:41:09 87.1 (192)    12/07/24 1545 87.1 (192.02)          Physical Exam  Constitutional:       General: He is not in acute distress.     Appearance: He is not ill-appearing.   HENT:      Head: Normocephalic and atraumatic.      Right Ear: External ear normal.      Left Ear: External ear normal.      Nose: Nose normal. No congestion or rhinorrhea.      Mouth/Throat:      Mouth: Mucous membranes are moist.   Eyes:      General: No scleral icterus.        Right eye: No discharge.         Left eye: No discharge.      Conjunctiva/sclera: Conjunctivae normal.   Cardiovascular:      Rate and Rhythm: Tachycardia present.   Pulmonary:      Effort: Pulmonary effort is normal. No respiratory distress.      Breath sounds: Normal breath sounds. No wheezing or  rales.   Abdominal:      General: Abdomen is flat. There is no distension.      Palpations: Abdomen is soft.      Tenderness: There is no abdominal tenderness. There is no guarding.   Musculoskeletal:         General: No swelling or tenderness.      Cervical back: No rigidity.   Skin:     General: Skin is warm and dry.   Neurological:      General: No focal deficit present.      Mental Status: He is alert. Mental status is at baseline.   Psychiatric:         Mood and Affect: Mood normal.         Thought Content: Thought content normal.         Judgment: Judgment normal.           Lab Results: I have reviewed the following results:  Recent Labs     12/07/24  1309 12/07/24  1813   WBC 5.74  --    HGB 11.6*  --    HCT 35.4*  --      --    SODIUM 144  --    K 4.6  --      --    CO2 25  --    BUN 16  --    CREATININE 1.02  --    GLUC 77  --    MG 1.6*  --    AST 46*  --    ALT 31  --    ALB 3.6  --    TBILI 0.51  --    ALKPHOS 108*  --    PTT 29  --    INR 1.07  --    HSTNI0 116*  --    HSTNI2  --  107*   *  --      Micro:  Lab Results   Component Value Date    BLOODCX No Growth After 5 Days. 11/16/2024    BLOODCX No Growth After 5 Days. 11/16/2024    BLOODCX No Growth After 5 Days. 05/10/2024    URINECX No Growth <1000 cfu/mL 10/29/2023             Currently Ordered Meds:   Current Facility-Administered Medications:     apixaban (ELIQUIS) tablet 5 mg, BID    aspirin chewable tablet 81 mg, Daily    diltiazem (CARDIZEM) 125 mg in sodium chloride 0.9 % 125 mL infusion, Titrated, Last Rate: 7.5 mg/hr (12/08/24 0252)    ferrous sulfate tablet 325 mg, Daily With Breakfast    fluticasone-vilanterol 200-25 mcg/actuation 1 puff, Daily    folic acid (FOLVITE) tablet 1,000 mcg, Daily    gabapentin (NEURONTIN) capsule 300 mg, TID    guaiFENesin (MUCINEX) 12 hr tablet 600 mg, Q12H SEDA    guaiFENesin (ROBITUSSIN) oral liquid 200 mg, Q4H PRN    insulin lispro (HumALOG/ADMELOG) 100 units/mL subcutaneous injection  "1-5 Units, 4x Daily (AC & HS)    lactated ringers infusion, Continuous, Last Rate: 100 mL/hr (12/07/24 2131)    metoprolol tartrate (LOPRESSOR) tablet 200 mg, Q12H SEDA    nicotine (NICODERM CQ) 21 mg/24 hr TD 24 hr patch 1 patch, Daily    pantoprazole (PROTONIX) EC tablet 40 mg, BID AC    ranolazine (RANEXA) 12 hr tablet 500 mg, Q12H    senna-docusate sodium (SENOKOT S) 8.6-50 mg per tablet 1 tablet, Daily PRN    sodium chloride 3 % inhalation solution 4 mL, Q8H    tamsulosin (FLOMAX) capsule 0.4 mg, Daily    thiamine tablet 100 mg, Daily  VTE Pharmacologic Prophylaxis: VTE covered by:  apixaban, Oral, 5 mg at 12/07/24 2049      VTE Mechanical Prophylaxis: sequential compression device    Administrative Statements     Portions of the record may have been created with voice recognition software.  Occasional wrong word or \"sound a like\" substitutions may have occurred due to the inherent limitations of voice recognition software.  Read the chart carefully and recognize, using context, where substitutions have occurred.  ==  Mary Ann Stockton DO  [facility]  Family Medicine Residency PGY-  "

## 2024-12-08 NOTE — ASSESSMENT & PLAN NOTE
Extensive hx of alcohol abuse with frequent admissions, declining IP detox program. Endorses last drink on same day as admission, no PO intake for 3 days before admission. Noncompliant with home medications include 1000 mcg daily and thiamine 100 mg daily.    -Ethanol 428   -lipase 87    Continue home medications  CIWA protocol initiated during hospital stay   Seizure precautions    Encourage alcohol cessation as able

## 2024-12-08 NOTE — ASSESSMENT & PLAN NOTE
Wt Readings from Last 3 Encounters:   12/07/24 87.1 kg (192 lb)   11/28/24 83 kg (182 lb 15.7 oz)   11/25/24 81.2 kg (179 lb)     Chronic, hypovolemic on examination   Echo (8/2024): EF 50%, PAP 45 to 50       Plan:  -Gentle IVF in setting of acidosis and CHF  -I&O's, daily wt   -Consider lasix if evidence of volume overload   -Resume home ranexa, metoprolol

## 2024-12-08 NOTE — ASSESSMENT & PLAN NOTE
Lab Results   Component Value Date    HGBA1C 5.2 11/14/2024     Well controlled, Home meds include metformin 500 mg     Plan:  -Hold PO agents, insulin sliding scale with accuchecks  -hypoglycemia protocol

## 2024-12-08 NOTE — UTILIZATION REVIEW
Initial Clinical Review    Admission: Date/Time/Statement:   Admission Orders (From admission, onward)       Ordered        12/07/24 1758  Place in Observation  Once                          Orders Placed This Encounter   Procedures    Place in Observation     Standing Status:   Standing     Number of Occurrences:   1     Level of Care:   Level 2 Stepdown / HOT [14]     ED Arrival Information       Expected   -    Arrival   12/7/2024 12:45    Acuity   Emergent              Means of arrival   Ambulance    Escorted by   [facility]   Hospitalist    Admission type   Emergency              Arrival complaint   Chest Pain             Chief Complaint   Patient presents with    Atrial Fibrillation     Patient arrives with medics. Patient called for chest pain. Found patient in Atrial flutter. 324 of ASA given, and 20 mg and 25 mg cardizem given pta.        Initial Presentation: 64 y.o. male presented to ED via EMS as observation for A Fib. Past medical history of alcohol abuse, CHF, A-fib/a flutter, hypertension, COPD, diabetes mellitus type 2, hyponatremia, BPH, nicotine dependence presented to ED with chest pain started 4 hours prior to presentation to ED. Pain is nonradiating and localized to the left side of upper chest.  He reports worsening associated palpitations with the chest pain. Reports to have not eaten in the past 3 days with only drinking vodka. Last drink couple hours prior to admission. In the ED patient was noted to be tachycardic and in atrial flutter with with RVR. He admits to being noncompliant with medications from last discharge confirmed with his low digoxin level on admission. On exam tachycardia. Plan IV Cardizem gtt,continue dig loading as able.dig <0.3  telemetry accu checks with SSI, CIWA high frequency chest wall oscillation SCD and supportive care. CT PE protocol noted no pulmonary embolus, but there was mucus plugging and debris from left main stem to left lower lobe with  occlusion. Question aspiration secondary to intoxication       Date: 12-08-24   Day 2: CIWA scores 3continue to monitor labs replete e-lytes as needed, telemetry,s/p S/P IV dig loading 250 mcg transition from IV Cardizem to PO Cardizem. Continue telemetry accu checks with SSI, CIWA high frequency chest wall oscillation SCD and supportive care.    Cardiology consult:  ssessment & Plan  Atrial fibrillation with RVR ([facility])  patient with chronic atrial fibrillation  rapid rate in the setting of alcohol use and medication noncompliance  heart rate improved after patient was placed on Cardizem drip which is currently at 7.5 mg per hour  will discontinue IV Cardizem and convert patient over to oral Cardizem  continue Lopressor 200 mg BID  patient started on digoxin load per primary team  Continue Eliquis 5 mg bid  will follow rate control strategy  Elevated troponin  high-sensitivity troponins: 116 (0hr), 107 (2hr)  non-MI troponin elevation secondary to rapid heart rate, frequent falls at home and shortness of breath/hypoxia  Alcohol abuse  alcohol level 428  patient on CIWA protocol      ED Treatment-Medication Administration from 12/07/2024 1245 to 12/07/2024 1826         Date/Time Order Dose Route Action     12/07/2024 1316 sodium chloride 0.9 % bolus 1,000 mL 1,000 mL Intravenous New Bag     12/07/2024 1436 diltiazem (CARDIZEM) 125 mg in sodium chloride 0.9 % 125 mL infusion 5 mg/hr Intravenous New Bag     12/07/2024 1455 diltiazem (CARDIZEM) 125 mg in sodium chloride 0.9 % 125 mL infusion 7.5 mg/hr Intravenous Rate/Dose Change     12/07/2024 1510 diltiazem (CARDIZEM) 125 mg in sodium chloride 0.9 % 125 mL infusion 10 mg/hr Intravenous Rate/Dose Change     12/07/2024 1535 diltiazem (CARDIZEM) 125 mg in sodium chloride 0.9 % 125 mL infusion 12.5 mg/hr Intravenous Rate/Dose Change     12/07/2024 1616 diltiazem (CARDIZEM) 125 mg in sodium chloride 0.9 % 125 mL infusion 15 mg/hr Intravenous Rate/Dose Change     12/07/2024  1424 magnesium sulfate 2 g/50 mL IVPB (premix) 2 g 2 g Intravenous New Bag     12/07/2024 1507 iohexol (OMNIPAQUE) 350 MG/ML injection (MULTI-DOSE) 85 mL 85 mL Intravenous Given     12/07/2024 1525 diltiazem (CARDIZEM) injection 20 mg 20 mg Intravenous Given     12/07/2024 1555 diltiazem (CARDIZEM) injection 15 mg 15 mg Intravenous Given     12/07/2024 1550 sodium chloride 0.9 % bolus 1,000 mL 1,000 mL Intravenous New Bag     12/07/2024 1620 diazepam (VALIUM) injection 5 mg 5 mg Intravenous Given     12/07/2024 1712 diazepam (VALIUM) injection 5 mg 5 mg Intravenous Given     12/07/2024 1805 diltiazem (CARDIZEM) injection 20 mg 20 mg Intravenous Given     12/07/2024 1807 lactated ringers infusion 150 mL/hr Intravenous New Bag     12/07/2024 1816 digoxin (LANOXIN) injection 250 mcg 250 mcg Intravenous Given            Scheduled Medications:  apixaban, 5 mg, Oral, BID  aspirin, 81 mg, Oral, Daily  diltiazem, 60 mg, Oral, Q6H SEDA  ferrous sulfate, 325 mg, Oral, Daily With Breakfast  fluticasone-vilanterol, 1 puff, Inhalation, Daily  folic acid, 1,000 mcg, Oral, Daily  gabapentin, 300 mg, Oral, TID  guaiFENesin, 600 mg, Oral, Q12H SEDA  insulin lispro, 1-5 Units, Subcutaneous, 4x Daily (AC & HS)  levalbuterol, 0.63 mg, Nebulization, TID  magnesium sulfate, 4 g, Intravenous, Once  metoprolol tartrate, 200 mg, Oral, Q12H SEDA  nicotine, 1 patch, Transdermal, Daily  pantoprazole, 40 mg, Oral, BID AC  ranolazine, 500 mg, Oral, Q12H  sodium chloride, 4 mL, Nebulization, Q8H  tamsulosin, 0.4 mg, Oral, Daily  vitamin B-1, 100 mg, Oral, Daily      Continuous IV Infusions:  diltiazem, 1-15 mg/hr, Intravenous, Titrated      PRN Meds:  guaiFENesin, 200 mg, Oral, Q4H PRN  senna-docusate sodium, 1 tablet, Oral, Daily PRN      ED Triage Vitals   Temperature Pulse Respirations Blood Pressure SpO2 Pain Score   12/07/24 1300 12/07/24 1254 12/07/24 1254 12/07/24 1254 12/07/24 1254 12/07/24 1430   97.9 °F (36.6 °C) 87 15 107/62 (!) 88 % No  Pain     Weight (last 2 days)       Date/Time Weight    12/07/24 22:41:09 87.1 (192)    12/07/24 1545 87.1 (192.02)            Vital Signs (last 3 days)       Date/Time Temp Pulse Resp BP MAP (mmHg) SpO2 Calculated FIO2 (%) - Nasal Cannula Nasal Cannula O2 Flow Rate (L/min) O2 Device Patient Position - Orthostatic VS [place] Coma Scale Score CIWA-Ar Total Pain    12/08/24 07:32:38 98.5 °F (36.9 °C) 71 22 133/75 94 91 % 40 5 L/min Nasal cannula Lying -- -- No Pain    12/08/24 0400 -- 71 -- 128/74 92 86 % -- -- -- -- -- -- --    12/08/24 0300 -- 71 -- 128/74 -- -- -- -- -- -- -- 3 --    12/08/24 02:53:06 -- 71 -- 128/74 92 83 % -- -- -- -- -- -- --    12/08/24 0215 -- 72 -- 111/62 78 90 % -- -- -- -- -- -- --    12/08/24 0100 -- 71 -- 102/57 72 85 % -- -- -- -- -- -- --    12/08/24 0000 -- 71 -- 102/56 71 88 % -- -- -- -- -- -- --    12/07/24 2300 -- 102 -- 130/76 94 91 % 28 2 L/min Nasal cannula -- -- 3 No Pain    12/07/24 22:41:09 97.8 °F (36.6 °C) 129 18 133/77 96 88 % -- -- -- -- -- -- --    12/07/24 2200 -- 141 -- 150/90 110 90 % -- -- -- -- -- -- --    12/07/24 2100 -- 153 -- 158/96 117 89 % -- -- -- -- -- 3 --    12/07/24 2050 -- -- -- -- -- 90 % -- -- None (Room air) -- -- -- --    12/07/24 1900 -- 151 -- -- -- 95 % -- -- -- -- -- -- --    12/07/24 18:42:33 97.6 °F (36.4 °C) 147 19 165/99 121 95 % -- -- -- -- -- -- --    12/07/24 1817 -- 150 18 145/81 104 93 % -- -- None (Room air) Lying -- -- --    12/07/24 1815 -- 150 18 -- -- 94 % -- -- -- -- -- -- --    12/07/24 1804 -- 150 18 153/91 115 91 % -- -- None (Room air) Other (Comment) -- -- --    12/07/24 1745 -- 150 15 159/96 120 93 % -- -- -- -- -- -- --    12/07/24 1730 -- 150 14 156/97 120 -- -- -- -- -- -- -- --    12/07/24 1715 -- 144 20 153/93 113 97 % -- -- -- -- -- -- --    12/07/24 1708 -- -- -- -- -- -- -- -- Nasal cannula -- -- -- --    12/07/24 1700 -- 148 16 153/92 117 98 % -- -- -- -- -- -- --    12/07/24 1645 -- 142 20 154/81 108 96 % -- -- --  -- -- -- --    12/07/24 1630 -- 140 19 133/73 95 96 % -- -- -- -- -- -- --    12/07/24 1616 -- -- -- 137/86 -- -- -- -- -- -- -- -- --    12/07/24 1615 -- 150 10 137/86 104 98 % -- -- -- -- -- -- --    12/07/24 1600 -- 150 17 127/77 96 98 % -- -- -- -- -- 3 --    12/07/24 1545 -- 148 17 128/76 95 95 % -- -- -- -- -- -- --    12/07/24 1530 -- 146 12 124/78 94 96 % -- -- -- -- -- -- --    12/07/24 1430 -- 150 18 146/79 103 95 % -- -- -- -- -- -- No Pain    12/07/24 1415 -- 150 12 133/88 106 96 % -- -- -- -- -- -- --    12/07/24 1409 -- 139 12 136/82 100 97 % 28 2 L/min Nasal cannula Sitting -- -- --    12/07/24 1321 -- -- -- -- -- -- -- -- -- -- 15 -- --    12/07/24 1300 97.9 °F (36.6 °C) 101 15 102/59 75 94 % -- -- -- -- -- -- --    12/07/24 1257 -- 75 17 -- -- 94 % 28 2 L/min Nasal cannula -- -- -- --    12/07/24 1254 -- 87 15 107/62 80 88 % -- -- None (Room air) -- -- -- --           CIWA-Ar Score       Row Name 12/08/24 0300 12/07/24 2300 12/07/24 2100       CIWA-Ar    /74 -- --    Pulse 71 -- --    Nausea and Vomiting 0 0 0    Tactile Disturbances 0 0 0    Tremor 2 2 2    Auditory Disturbances 0 0 0    Paroxysmal Sweats 0 0 0    Visual Disturbances 0 0 0    Anxiety 0 0 0    Headache, Fullness in Head 1 1 1    Agitation 0 0 0    Orientation and Clouding of Sensorium 0 0 0    CIWA-Ar Total 3 3 3      Row Name 12/07/24 1600             CIWA-Ar    Nausea and Vomiting 0      Tactile Disturbances 0      Tremor 2      Auditory Disturbances 0      Paroxysmal Sweats 0      Visual Disturbances 0      Anxiety 0      Headache, Fullness in Head 0      Agitation 1      Orientation and Clouding of Sensorium 0      CIWA-Ar Total 3                      Pertinent Labs/Diagnostic Test Results:   Radiology:  CTA chest pe study   Final Interpretation by Zuly Chacon MD (12/07 2123)      No pulmonary embolism.      Left lower lobe bronchitis with mucous plugging and mucus/debris extending from the left mainstem bronchus  into the left lower lobe bronchus with occlusion.      Workstation performed: [id number]         XR chest 1 view portable   Final Interpretation by Heidy Welch MD (12/08 0654)      No acute cardiopulmonary disease.      See subsequent chest CT for further evaluation.      Workstation performed: [id number]           Cardiology: EKG 12-07-24   Interpretation: abnormal    Rate:     ECG rate:  101    ECG rate assessment: tachycardic    Rhythm:     Rhythm: atrial fibrillation    Ectopy:     Ectopy: none    QRS:     QRS axis:  Right  ST segments:     ST segments:  Non-specific  T waves:     T waves: non-specific    Q waves:     Q waves:  V1 and V2         Results from last 7 days   Lab Units 12/08/24  0605 12/07/24  1309   WBC Thousand/uL 7.45 5.74   HEMOGLOBIN g/dL 11.0* 11.6*   HEMATOCRIT % 34.8* 35.4*   PLATELETS Thousands/uL 159 192   TOTAL NEUT ABS Thousands/µL 5.51 3.24         Results from last 7 days   Lab Units 12/08/24  0605 12/07/24  1309   SODIUM mmol/L 140 144   POTASSIUM mmol/L 4.7 4.6   CHLORIDE mmol/L 105 105   CO2 mmol/L 26 25   ANION GAP mmol/L 9 14*   BUN mg/dL 15 16   CREATININE mg/dL 0.88 1.02   EGFR ml/min/1.73sq m 92 78   CALCIUM mg/dL 8.0* 8.3*   MAGNESIUM mg/dL 1.6* 1.6*     Results from last 7 days   Lab Units 12/08/24  0605 12/07/24  1309   AST U/L 35 46*   ALT U/L 28 31   ALK PHOS U/L 112* 108*   TOTAL PROTEIN g/dL 6.6 7.0   ALBUMIN g/dL 3.6 3.6   TOTAL BILIRUBIN mg/dL 0.76 0.51     Results from last 7 days   Lab Units 12/08/24  0714 12/07/24  2132   POC GLUCOSE mg/dl 116 108     Results from last 7 days   Lab Units 12/08/24  0605 12/07/24  1309   GLUCOSE RANDOM mg/dL 122 77             BETA-HYDROXYBUTYRATE   Date Value Ref Range Status   01/22/2024 3.2 (H) <0.6 mmol/L Final      Results from last 7 days   Lab Units 12/07/24  1813 12/07/24  1309   HS TNI 0HR ng/L  --  116*   HS TNI 2HR ng/L 107*  --    HSTNI D2 ng/L -9  --      Results from last 7 days   Lab Units 12/07/24  3396    D-DIMER QUANTITATIVE ug/ml FEU 2.16*     Results from last 7 days   Lab Units 12/07/24  1309   PROTIME seconds 14.4   INR  1.07   PTT seconds 29                     Results from last 7 days   Lab Units 12/07/24  1309   DIGOXIN LVL ng/mL <0.3*     Results from last 7 days   Lab Units 12/07/24  1309   BNP pg/mL 131*     Results from last 7 days   Lab Units 12/07/24  1309   LIPASE u/L 87*     Results from last 7 days   Lab Units 12/07/24  1846   CLARITY UA  Clear   COLOR UA  Yellow   SPEC GRAV UA  1.020   PH UA  6.0   GLUCOSE UA mg/dl Negative   KETONES UA mg/dl Negative   BLOOD UA  Negative   PROTEIN UA mg/dl 30 (1+)*   NITRITE UA  Negative   BILIRUBIN UA  Negative   UROBILINOGEN UA (BE) mg/dl <2.0   LEUKOCYTES UA  Negative   WBC UA /hpf 0-5   RBC UA /hpf None Seen   BACTERIA UA /hpf Occasional   EPITHELIAL CELLS WET PREP /hpf Occasional     Results from last 7 days   Lab Units 12/07/24  1846   AMPH/METH  Negative   BARBITURATE UR  Positive*   BENZODIAZEPINE UR  Positive*   COCAINE UR  Negative   METHADONE URINE  Negative   OPIATE UR  Negative   PCP UR  Negative   THC UR  Negative     Results from last 7 days   Lab Units 12/07/24  1309   ETHANOL LVL mg/dL 428*         Past Medical History:   Diagnosis Date    Acute on chronic kidney failure  (HCC)     Alcohol withdrawal (HCC) 06/07/2019    Atrial fibrillation (HCC)     Cancer (HCC)     prostate ca,had radiation    Cardiac disease     stents,then triple bypass    COPD (chronic obstructive pulmonary disease) (HCC)     Coronary artery disease     ETOH abuse     Heart failure (HCC)     History of heart surgery     says [facility] bypass [facility]    Hx of heart artery stent     2014    Hyperlipidemia     Hypertension     Hyponatremia 10/17/2019    Hypovolemic shock (HCC) 12/22/2019    Lumbar spondylitis (HCC) 10/13/2022    Metabolic acidosis, increased anion gap 04/21/2021    Nasal bone fracture 10/10/2022    Prostate CA (HCC)     S/P CABG x 3     2004    Sleep apnea       Present on Admission:   Alcohol abuse   Ambulatory dysfunction   Atrial fibrillation with RVR (HCC)   BPH (benign prostatic hyperplasia)   Chronic heart failure with preserved ejection fraction (HCC)   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 disease (HCC)   Iron deficiency   Sleep apnea   Elevated troponin      Admitting Diagnosis: Atrial flutter (HCC) [I48.92]  Alcohol intoxication (HCC) [F10.929]  Hypoxia [R09.02]  Atrial fibrillation with rapid ventricular response (HCC) [I48.91]  Age/Sex: 64 y.o. male    Network Utilization Review Department  ATTENTION: Please call with any questions or concerns to [phone] and carefully listen to the prompts so that you are directed to the right person. All voicemails are confidential.   For Discharge needs, contact Care Management DC Support Team at [phone] opt. 2  Send all requests for admission clinical reviews, approved or denied determinations and any other requests to dedicated fax number below belonging to the campus where the patient is receiving treatment. List of dedicated fax numbers for the Facilities:  FACILITY NAME UR FAX NUMBER   ADMISSION DENIALS (Administrative/Medical Necessity) [phone]   DISCHARGE SUPPORT TEAM (NETWORK) [phone]   PARENT CHILD HEALTH (Maternity/NICU/Pediatrics) [phone]   [facility] [phone]   [facility] [phone]   [facility] [phone]   [facility] [phone]   [facility] [phone]   [facility] [phone]   [facility] [phone]   [facility] [phone]   [facility] [phone]   [facility] [phone]   [facility] [phone]   [facility]  [phone]

## 2024-12-08 NOTE — ASSESSMENT & PLAN NOTE
High-sensitivity troponin 116 > 107 on admission  Likely secondary to A-fib RVR  Cardiology was consulted on admission

## 2024-12-08 NOTE — PLAN OF CARE

## 2024-12-08 NOTE — ASSESSMENT & PLAN NOTE
Upon initial presentation, patient had heart rate in the 150s.  Overall improved now with heart rate in the 70s.  Anticoagulated on Eliquis

## 2024-12-08 NOTE — ASSESSMENT & PLAN NOTE
high-sensitivity troponins: 116 (0hr), 107 (2hr)  non-MI troponin elevation secondary to rapid heart rate, frequent falls at home and shortness of breath/hypoxia

## 2024-12-08 NOTE — ASSESSMENT & PLAN NOTE
patient with chronic atrial fibrillation  rapid rate in the setting of alcohol use and medication noncompliance  heart rate improved after patient was placed on Cardizem drip which is currently at 7.5 mg per hour  will discontinue IV Cardizem and convert patient over to oral Cardizem  continue Lopressor 200 mg BID  patient started on digoxin load per primary team  Continue Eliquis 5 mg bid  will follow rate control strategy

## 2024-12-08 NOTE — ASSESSMENT & PLAN NOTE
Most likely 2/2 mucus plugging, baseline no oxygen at home    CT chest PE: No pulmonary embolism. Left lower lobe bronchitis with mucous plugging and mucus/debris extending from the left mainstem bronchus into the left lower lobe bronchus with occlusion.    Pt required 2 L NC POA  Increase to 5L today    Plan:  -Respiratory clearance, airway clearance  -Chest PT oscillator  -Mucinex  -Xopenex TID    -3% Saline nebs  - consider lasix if oxygen requirement does not decrease with treatments

## 2024-12-08 NOTE — ASSESSMENT & PLAN NOTE
Lab Results   Component Value Date    HGBA1C 5.2 11/14/2024       Recent Labs     12/07/24 2132   POCGLU 108       Blood Sugar Average: Last 72 hrs:  (P) 108  managed per primary team

## 2024-12-08 NOTE — ASSESSMENT & PLAN NOTE
Pt presented to ED w/ chest pain, found to be in aflutter with RVR. Recurrent admissions with similar complaint, recently d/c to home with digoxin, metoprolol, eliquis, Pt states that Pt didn't take one day before admission     -dig <0.3   -D dimer 2.16  -CTA PE: no PE    Plan:  Cardio consulted  -on cardizem gtt now, switching over to PO Cardizem 60mg Q6hrs   -S/P IV dig loading 250 mcg   -hold home PO digoxin for now    -Resume home Eliquis   -Resume home metoprolol

## 2024-12-08 NOTE — ASSESSMENT & PLAN NOTE
"Trop 116>107  Most likely non-ischemic trop elevation 2/2 afib RVR    Pt is chest pian free currently    - Telemetry  - monitor Chest pain  - rest of A/P as per \"Afib with RVR\"  "

## 2024-12-08 NOTE — CONSULTS
Consultation - Pulmonology   Name: Gregg Partida 64 y.o. male I MRN: [id number]  Unit/Bed#: 4 [address] I Date of Admission: 12/7/2024   Date of Service: 12/8/2024 I Hospital Day: 0   Inpatient consult to Pulmonology  Consult performed by: Lashon Coyne DO  Consult ordered by: Pablito Presley MD        Physician Requesting Evaluation: Samuel Thompson MD   Reason for Evaluation / Principal Problem: Bronchitis    Assessment & Plan  Acute respiratory failure with hypoxia (HCC)  Likely multifactorial due to mucous plugging, difficulty clearing secretions, poor inspiratory effort, underlying COPD and possibly an element of pulmonary edema.  Encourage incentive spirometry and flutter device.  Titrate O2 to maintain saturations above 88%.  Consider trial of diuretics if no improvement.  Mucus plugging of bronchi  Chest CT shows mucous plugging in left lower lobe without significant atelectasis.  There would be low yield for bronchoscopy.  Would optimize noninvasive means of pulmonary toilet including vest therapy, flutter device and incentive spirometry.  COPD, severity to be determined (HCC)  Patient has emphysema on imaging and has longstanding smoking history.  No PFTs on file.  Currently on Breo Ellipta.  Would benefit from outpatient PFTs to assess severity of his underlying lung disease and optimize maintenance inhaler therapy.  Atrial fibrillation with RVR (HCC)  Upon initial presentation, patient had heart rate in the 150s.  Overall improved now with heart rate in the 70s.  Anticoagulated on Eliquis  Alcohol abuse  Monitor closely for signs of withdrawal.  Last alcohol intake was yesterday, prior to admission  Chronic heart failure with preserved ejection fraction (HCC)  Wt Readings from Last 3 Encounters:   12/07/24 87.1 kg (192 lb)   11/28/24 83 kg (182 lb 15.7 oz)   11/25/24 81.2 kg (179 lb)   Weight is up 13 pounds over the past 2 weeks.  Consider trial of diuretics      History of Present Illness    Gregg Partida is a 64 y.o. male who presents with chest pain and shortness of breath.  Patient has history of COPD, congestive heart failure, atrial fibrillation/flutter and diabetes who presented last evening with nonradiating chest pain.  Also with associated palpitations.  He was noted to have atrial flutter with rapid ventricular response.  Patient has alcohol use history with most recent drink being a few hours prior to admission.  He underwent CT of the chest which showed some left lower lobe mucous plugging.  Patient noted to have escalating oxygen needs, currently on mid flow at 12 L/min.  Patient denies significant shortness of breath while at rest but does have dyspnea on exertion.  He has a cough which is mostly nonproductive.  Patient admits to have been smoking up to a pack per day up until the time of admission.  He has been smoking since he was a teenager.  Patient takes Breo Ellipta as an outpatient.  He has not been seen by an outpatient pulmonologist.    Review of Systems otherwise negative    Historical Information   I have reviewed the patient's PMH, PSH, Social History, Family History, Meds, and Allergies  Historical Information   Past Medical History:   Diagnosis Date    Acute on chronic kidney failure  (HCC)     Alcohol withdrawal (HCC) 06/07/2019    Atrial fibrillation (HCC)     Cancer (HCC)     prostate ca,had radiation    Cardiac disease     stents,then triple bypass    COPD (chronic obstructive pulmonary disease) (HCC)     Coronary artery disease     ETOH abuse     Heart failure (HCC)     History of heart surgery     says triple bypass [facility]    Hx of heart artery stent     2014    Hyperlipidemia     Hypertension     Hyponatremia 10/17/2019    Hypovolemic shock (HCC) 12/22/2019    Lumbar spondylitis (HCC) 10/13/2022    Metabolic acidosis, increased anion gap 04/21/2021    Nasal bone fracture 10/10/2022    Prostate CA (HCC)     S/P CABG x 3     2004    Sleep apnea      Past  Surgical History:   Procedure Laterality Date    CARDIAC CATHETERIZATION      2 stents    CORONARY ARTERY BYPASS GRAFT  2004    HI ARTHRD ANT INTERBODY MIN DSC CRV BELOW C2 N/A 12/16/2020    Procedure: Anterior cervical discectomy with fusion C4-C7; Posterior cervical decompression and fusion C2-T2;  Surgeon: David Rowell MD;  Location: [facility];  Service: Neurosurgery    TONSILLECTOMY       Social History     Tobacco Use    Smoking status: Every Day     Current packs/day: 1.50     Average packs/day: 1.5 packs/day for 40.0 years (60.0 ttl pk-yrs)     Types: Cigarettes    Smokeless tobacco: Never   Vaping Use    Vaping status: Never Used   Substance and Sexual Activity    Alcohol use: Yes     Alcohol/week: 4.0 standard drinks of alcohol     Types: 4 Standard drinks or equivalent per week     Comment: quart of vodka daily    Drug use: No    Sexual activity: Not Currently     E-Cigarette/Vaping    E-Cigarette Use Never User      E-Cigarette/Vaping Substances    Nicotine Yes     THC No     CBD No     Flavoring No     Other No     Unknown No      Family history non-contributory    Objective :  Temp:  [97.6 °F (36.4 °C)-98.5 °F (36.9 °C)] 98.5 °F (36.9 °C)  HR:  [] 71  BP: (102-165)/(56-99) 137/84  Resp:  [10-22] 20  SpO2:  [83 %-99 %] 96 %  O2 Device: Mid flow nasal cannula  Nasal Cannula O2 Flow Rate (L/min):  [2 L/min-12 L/min] 12 L/min    Physical Exam  Vitals reviewed.   Constitutional:       General: He is not in acute distress.     Appearance: He is well-developed.   Eyes:      General: No scleral icterus.  Neck:      Vascular: No JVD.   Cardiovascular:      Rate and Rhythm: Normal rate and regular rhythm.   Pulmonary:      Breath sounds: Rhonchi and rales present. No wheezing.   Abdominal:      Tenderness: There is no abdominal tenderness.   Skin:     General: Skin is warm and dry.   Neurological:      Mental Status: He is alert and oriented to person, place, and time.           Lab Results: I have  reviewed the following results:  .     12/07/24  1813 12/08/24  0605   WBC  --  7.45   HGB  --  11.0*   HCT  --  34.8*   PLT  --  159   SODIUM  --  140   K  --  4.7   CL  --  105   CO2  --  26   BUN  --  15   CREATININE  --  0.88   GLUC  --  122   MG  --  1.6*   AST  --  35   ALT  --  28   ALB  --  3.6   TBILI  --  0.76   ALKPHOS  --  112*   HSTNI2 107*  --      ABG: No new results in last 24 hours.    Imaging Results Review: I personally reviewed the following image studies in PACS and associated radiology reports: chest xray and CT chest. My interpretation of the radiology images/reports is: Chest x-ray shows vascular congestion.  Chest CT shows left lower lobe mucous plugging without significant associated atelectasis.  There is mild centrilobular emphysema

## 2024-12-08 NOTE — ASSESSMENT & PLAN NOTE
Known overnight hypoxia in setting of sleep apnea, noncompliant with CPAP. Overnight, declined CPAP or BIPAP, Pt removed NC O2 overnight even with hypoxia.

## 2024-12-08 NOTE — ASSESSMENT & PLAN NOTE
Wt Readings from Last 3 Encounters:   12/07/24 87.1 kg (192 lb)   11/28/24 83 kg (182 lb 15.7 oz)   11/25/24 81.2 kg (179 lb)   Weight is up 13 pounds over the past 2 weeks.  Consider trial of diuretics

## 2024-12-08 NOTE — ASSESSMENT & PLAN NOTE
Endorses dry cough    CT chest PE: No pulmonary embolism. Left lower lobe bronchitis with mucous plugging and mucus/debris extending from the left mainstem bronchus into the left lower lobe bronchus with occlusion.    Plan:  -Respiratory clearance, airway clearance  -Chest PT oscillator  -Saline nebs PRN

## 2024-12-09 ENCOUNTER — APPOINTMENT (EMERGENCY)
Dept: CT IMAGING | Facility: HOSPITAL | Age: 64
End: 2024-12-09
Payer: COMMERCIAL

## 2024-12-09 ENCOUNTER — APPOINTMENT (OUTPATIENT)
Dept: RADIOLOGY | Facility: HOSPITAL | Age: 64
End: 2024-12-09
Payer: COMMERCIAL

## 2024-12-09 ENCOUNTER — HOSPITAL ENCOUNTER (EMERGENCY)
Facility: HOSPITAL | Age: 64
Discharge: LEFT BEFORE DISCHARGE | End: 2024-12-09
Attending: SURGERY
Payer: COMMERCIAL

## 2024-12-09 VITALS
SYSTOLIC BLOOD PRESSURE: 91 MMHG | WEIGHT: 185.19 LBS | OXYGEN SATURATION: 97 % | DIASTOLIC BLOOD PRESSURE: 59 MMHG | HEIGHT: 74 IN | HEART RATE: 64 BPM | TEMPERATURE: 96.8 F | BODY MASS INDEX: 23.77 KG/M2 | RESPIRATION RATE: 17 BRPM

## 2024-12-09 VITALS
DIASTOLIC BLOOD PRESSURE: 55 MMHG | RESPIRATION RATE: 18 BRPM | WEIGHT: 198.41 LBS | OXYGEN SATURATION: 90 % | SYSTOLIC BLOOD PRESSURE: 87 MMHG | HEART RATE: 83 BPM | TEMPERATURE: 99.4 F

## 2024-12-09 DIAGNOSIS — S09.90XA INJURY OF HEAD, INITIAL ENCOUNTER: ICD-10-CM
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
## 2024-12-09 PROBLEM — I95.9 HYPOTENSION: Status: ACTIVE | Noted: 2024-12-09

## 2024-12-09 LAB
ALBUMIN SERPL BCG-MCNC: 3.3 G/DL (ref 3.5–5)
ALP SERPL-CCNC: 91 U/L (ref 34–104)
ALT SERPL W P-5'-P-CCNC: 20 U/L (ref 7–52)
ANION GAP SERPL CALCULATED.3IONS-SCNC: 5 MMOL/L (ref 4–13)
AST SERPL W P-5'-P-CCNC: 22 U/L (ref 13–39)
BASE EXCESS BLDA CALC-SCNC: -6 MMOL/L (ref -2–3)
BASOPHILS # BLD AUTO: 0.07 THOUSANDS/ÂΜL (ref 0–0.1)
BASOPHILS NFR BLD AUTO: 1 % (ref 0–1)
BILIRUB SERPL-MCNC: 0.71 MG/DL (ref 0.2–1)
BUN SERPL-MCNC: 15 MG/DL (ref 5–25)
CA-I BLD-SCNC: 0.99 MMOL/L (ref 1.12–1.32)
CALCIUM ALBUM COR SERPL-MCNC: 8.5 MG/DL (ref 8.3–10.1)
CALCIUM SERPL-MCNC: 7.9 MG/DL (ref 8.4–10.2)
CHLORIDE SERPL-SCNC: 98 MMOL/L (ref 96–108)
CO2 SERPL-SCNC: 31 MMOL/L (ref 21–32)
CREAT SERPL-MCNC: 1.04 MG/DL (ref 0.6–1.3)
EOSINOPHIL # BLD AUTO: 0.12 THOUSAND/ÂΜL (ref 0–0.61)
EOSINOPHIL NFR BLD AUTO: 2 % (ref 0–6)
ERYTHROCYTE [DISTWIDTH] IN BLOOD BY AUTOMATED COUNT: 15.2 % (ref 11.6–15.1)
GFR SERPL CREATININE-BSD FRML MDRD: 76 ML/MIN/1.73SQ M
GLUCOSE SERPL-MCNC: 114 MG/DL (ref 65–140)
GLUCOSE SERPL-MCNC: 118 MG/DL (ref 65–140)
GLUCOSE SERPL-MCNC: 126 MG/DL (ref 65–140)
GLUCOSE SERPL-MCNC: 140 MG/DL (ref 65–140)
HCO3 BLDA-SCNC: 17.3 MMOL/L (ref 24–30)
HCT VFR BLD AUTO: 34.2 % (ref 36.5–49.3)
HCT VFR BLD CALC: 29 % (ref 36.5–49.3)
HGB BLD-MCNC: 10.9 G/DL (ref 12–17)
HGB BLDA-MCNC: 9.9 G/DL (ref 12–17)
IMM GRANULOCYTES # BLD AUTO: 0.02 THOUSAND/UL (ref 0–0.2)
IMM GRANULOCYTES NFR BLD AUTO: 0 % (ref 0–2)
LYMPHOCYTES # BLD AUTO: 1.11 THOUSANDS/ÂΜL (ref 0.6–4.47)
LYMPHOCYTES NFR BLD AUTO: 18 % (ref 14–44)
MAGNESIUM SERPL-MCNC: 1.6 MG/DL (ref 1.9–2.7)
MCH RBC QN AUTO: 31.9 PG (ref 26.8–34.3)
MCHC RBC AUTO-ENTMCNC: 31.9 G/DL (ref 31.4–37.4)
MCV RBC AUTO: 100 FL (ref 82–98)
MONOCYTES # BLD AUTO: 0.52 THOUSAND/ÂΜL (ref 0.17–1.22)
MONOCYTES NFR BLD AUTO: 8 % (ref 4–12)
NEUTROPHILS # BLD AUTO: 4.44 THOUSANDS/ÂΜL (ref 1.85–7.62)
NEUTS SEG NFR BLD AUTO: 71 % (ref 43–75)
NRBC BLD AUTO-RTO: 1 /100 WBCS
PCO2 BLD: 18 MMOL/L (ref 21–32)
PCO2 BLD: 26.9 MM HG (ref 42–50)
PH BLD: 7.42 [PH] (ref 7.3–7.4)
PLATELET # BLD AUTO: 116 THOUSANDS/UL (ref 149–390)
PMV BLD AUTO: 9.7 FL (ref 8.9–12.7)
PO2 BLD: 44 MM HG (ref 35–45)
POTASSIUM BLD-SCNC: 4.3 MMOL/L (ref 3.5–5.3)
POTASSIUM SERPL-SCNC: 4 MMOL/L (ref 3.5–5.3)
PROT SERPL-MCNC: 6.2 G/DL (ref 6.4–8.4)
RBC # BLD AUTO: 3.42 MILLION/UL (ref 3.88–5.62)
SAO2 % BLD FROM PO2: 81 % (ref 60–85)
SODIUM BLD-SCNC: 128 MMOL/L (ref 136–145)
SODIUM SERPL-SCNC: 134 MMOL/L (ref 135–147)
SPECIMEN SOURCE: ABNORMAL
WBC # BLD AUTO: 6.28 THOUSAND/UL (ref 4.31–10.16)

## 2024-12-09 PROCEDURE — 94760 N-INVAS EAR/PLS OXIMETRY 1: CPT

## 2024-12-09 PROCEDURE — 94669 MECHANICAL CHEST WALL OSCILL: CPT

## 2024-12-09 PROCEDURE — 71045 X-RAY EXAM CHEST 1 VIEW: CPT

## 2024-12-09 PROCEDURE — 99284 EMERGENCY DEPT VISIT MOD MDM: CPT

## 2024-12-09 PROCEDURE — 82803 BLOOD GASES ANY COMBINATION: CPT

## 2024-12-09 PROCEDURE — 99232 SBSQ HOSP IP/OBS MODERATE 35: CPT | Performed by: INTERNAL MEDICINE

## 2024-12-09 PROCEDURE — 94668 MNPJ CHEST WALL SBSQ: CPT

## 2024-12-09 PROCEDURE — 76604 US EXAM CHEST: CPT | Performed by: SURGERY

## 2024-12-09 PROCEDURE — 82330 ASSAY OF CALCIUM: CPT

## 2024-12-09 PROCEDURE — 82947 ASSAY GLUCOSE BLOOD QUANT: CPT

## 2024-12-09 PROCEDURE — 84132 ASSAY OF SERUM POTASSIUM: CPT

## 2024-12-09 PROCEDURE — 12001 RPR S/N/AX/GEN/TRNK 2.5CM/<: CPT | Performed by: SURGERY

## 2024-12-09 PROCEDURE — 83735 ASSAY OF MAGNESIUM: CPT

## 2024-12-09 PROCEDURE — 85025 COMPLETE CBC W/AUTO DIFF WBC: CPT

## 2024-12-09 PROCEDURE — 99238 HOSP IP/OBS DSCHRG MGMT 30/<: CPT | Performed by: NURSE PRACTITIONER

## 2024-12-09 PROCEDURE — EDAIR PR ED AIR: Performed by: EMERGENCY MEDICINE

## 2024-12-09 PROCEDURE — NC001 PR NO CHARGE: Performed by: SURGERY

## 2024-12-09 PROCEDURE — 85014 HEMATOCRIT: CPT

## 2024-12-09 PROCEDURE — 93308 TTE F-UP OR LMTD: CPT | Performed by: SURGERY

## 2024-12-09 PROCEDURE — 70450 CT HEAD/BRAIN W/O DYE: CPT

## 2024-12-09 PROCEDURE — 94664 DEMO&/EVAL PT USE INHALER: CPT

## 2024-12-09 PROCEDURE — 82948 REAGENT STRIP/BLOOD GLUCOSE: CPT

## 2024-12-09 PROCEDURE — 72125 CT NECK SPINE W/O DYE: CPT

## 2024-12-09 PROCEDURE — 76705 ECHO EXAM OF ABDOMEN: CPT | Performed by: SURGERY

## 2024-12-09 PROCEDURE — 94640 AIRWAY INHALATION TREATMENT: CPT

## 2024-12-09 PROCEDURE — 84295 ASSAY OF SERUM SODIUM: CPT

## 2024-12-09 PROCEDURE — 80053 COMPREHEN METABOLIC PANEL: CPT

## 2024-12-09 PROCEDURE — 99205 OFFICE O/P NEW HI 60 MIN: CPT | Performed by: SURGERY

## 2024-12-09 PROCEDURE — NC001 PR NO CHARGE: Performed by: ANESTHESIOLOGY

## 2024-12-09 RX ORDER — PHENOBARBITAL SODIUM 65 MG/ML
130 INJECTION, SOLUTION INTRAMUSCULAR; INTRAVENOUS ONCE
Status: DISCONTINUED | OUTPATIENT
Start: 2024-12-09 | End: 2024-12-09

## 2024-12-09 RX ORDER — DILTIAZEM HCL 60 MG
60 TABLET ORAL EVERY 6 HOURS SCHEDULED
Qty: 120 TABLET | Refills: 0 | Status: SHIPPED | OUTPATIENT
Start: 2024-12-09 | End: 2024-12-12

## 2024-12-09 RX ORDER — MAGNESIUM SULFATE HEPTAHYDRATE 40 MG/ML
4 INJECTION, SOLUTION INTRAVENOUS ONCE
Status: COMPLETED | OUTPATIENT
Start: 2024-12-09 | End: 2024-12-09

## 2024-12-09 RX ADMIN — Medication 4 ML: at 13:35

## 2024-12-09 RX ADMIN — THIAMINE HCL TAB 100 MG 100 MG: 100 TAB at 09:11

## 2024-12-09 RX ADMIN — PANTOPRAZOLE SODIUM 40 MG: 40 TABLET, DELAYED RELEASE ORAL at 06:16

## 2024-12-09 RX ADMIN — GUAIFENESIN 600 MG: 600 TABLET, EXTENDED RELEASE ORAL at 09:11

## 2024-12-09 RX ADMIN — TAMSULOSIN HYDROCHLORIDE 0.4 MG: 0.4 CAPSULE ORAL at 09:11

## 2024-12-09 RX ADMIN — APIXABAN 5 MG: 5 TABLET, FILM COATED ORAL at 09:11

## 2024-12-09 RX ADMIN — RANOLAZINE 500 MG: 500 TABLET, FILM COATED, EXTENDED RELEASE ORAL at 06:16

## 2024-12-09 RX ADMIN — DILTIAZEM HYDROCHLORIDE 60 MG: 60 TABLET ORAL at 01:09

## 2024-12-09 RX ADMIN — FLUTICASONE FUROATE AND VILANTEROL TRIFENATATE 1 PUFF: 200; 25 POWDER RESPIRATORY (INHALATION) at 09:11

## 2024-12-09 RX ADMIN — CHLORHEXIDINE GLUCONATE 15 ML: 1.2 RINSE ORAL at 09:10

## 2024-12-09 RX ADMIN — ASPIRIN 81 MG CHEWABLE TABLET 81 MG: 81 TABLET CHEWABLE at 09:11

## 2024-12-09 RX ADMIN — LEVALBUTEROL HYDROCHLORIDE 0.63 MG: 0.63 SOLUTION RESPIRATORY (INHALATION) at 13:22

## 2024-12-09 RX ADMIN — GABAPENTIN 300 MG: 300 CAPSULE ORAL at 09:11

## 2024-12-09 RX ADMIN — FERROUS SULFATE TAB 325 MG (65 MG ELEMENTAL FE) 325 MG: 325 (65 FE) TAB at 07:30

## 2024-12-09 RX ADMIN — NICOTINE 1 PATCH: 21 PATCH, EXTENDED RELEASE TRANSDERMAL at 09:10

## 2024-12-09 RX ADMIN — Medication 4 ML: at 07:18

## 2024-12-09 RX ADMIN — MAGNESIUM SULFATE HEPTAHYDRATE 4 G: 40 INJECTION, SOLUTION INTRAVENOUS at 07:30

## 2024-12-09 RX ADMIN — METOPROLOL TARTRATE 200 MG: 100 TABLET, FILM COATED ORAL at 09:11

## 2024-12-09 RX ADMIN — DILTIAZEM HYDROCHLORIDE 60 MG: 60 TABLET ORAL at 06:16

## 2024-12-09 RX ADMIN — FOLIC ACID 1000 MCG: 1 TABLET ORAL at 09:11

## 2024-12-09 RX ADMIN — LEVALBUTEROL HYDROCHLORIDE 0.63 MG: 0.63 SOLUTION RESPIRATORY (INHALATION) at 07:18

## 2024-12-09 NOTE — ASSESSMENT & PLAN NOTE
Scheduled folic acid, thiamine  Received phenobarbital 65 mg last evening which did not improve CIWA scores. Had additional 260mg last evening for headache, anxiety, agitation. Slept well thereafter  CIWA monitoring

## 2024-12-09 NOTE — ASSESSMENT & PLAN NOTE
Patient presented 12/7 with chest pain secondary to A-fib RVR  He was admitted to family medicine, required 2 L nasal cannula on admission  Escalating oxygen requirements today up to 15 L mid flow nasal cannula  12/7: CTA PE study revealed: Left lower lobe bronchitis with mucous plugging and mucus/debris extending from the left mainstem bronchus into the left lower lobe bronchus with occlusion   12/8: CXR revealed central congestion without acute consolidation    Plan:   Continues on high flow nasal cannula 30L 50%  S/p 40 mg Lasix yesterday with robust diuresis- 2.75 L yesterday   Continue airway clearance  Mucinex  Nebs

## 2024-12-09 NOTE — CASE MANAGEMENT
Case Management Discharge Planning Note    Patient name Gregg Partida  Location ICU 12/ICU 12 MRN [id number]  : 1960 Date 2024       Current Admission Date: 2024  Current Admission Diagnosis:Acute respiratory failure with hypoxia (HCC)   Patient Active Problem List    Diagnosis Date Noted Date Diagnosed    Mucus plugging of bronchi 2024     Acute respiratory failure with hypoxia (HCC) 2024     Chronic hyponatremia 2024     Closed fracture of one rib of left side 2024     Ambulatory dysfunction 2024     Financial insecurity 2024     BPH (benign prostatic hyperplasia) 2024     Chronic alcohol use 2024     Fall, initial encounter 2024     Hypertension 2024     Hemorrhagic cystitis 2024     Hypothyroidism 2024     Gastrointestinal hemorrhage with melena 10/29/2023     Elevated troponin 2023     Chest pain 2023     Paroxysmal atrial fibrillation (HCC) 2023     GERD (gastroesophageal reflux disease) 2023     Stable angina (HCC) 2022     Stage 3a chronic kidney disease (HCC) 2022     Fatty liver, alcoholic 04/15/2022     Nonischemic nontraumatic myocardial injury 04/10/2022     History of atrial fibrillation 10/25/2021     Mild protein-calorie malnutrition (HCC) 2021     Prostate cancer (HCC) 2021     Atrial fibrillation (HCC) 2020     Chronic heart failure with preserved ejection fraction (HCC) 2019     Noncompliance 2019     Alcohol abuse 2019     Alcohol withdrawal syndrome with complication (HCC) 10/17/2019     Electrolyte abnormality 10/17/2019     Cervical spinal stenosis 10/17/2019     History of prostate cancer 2019     CAD (coronary artery disease) 2019     Nicotine abuse 2019     Hypomagnesemia 10/10/2018     Depression, recurrent (HCC) 10/10/2018     Hx of CABG 10/10/2018     Dyslipidemia 10/10/2018     COPD, severity to be  determined (HCC) 10/09/2018     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 disease (HCC) 10/09/2018     Hypertensive heart and chronic kidney disease with heart failure and stage 1 through stage 4 chronic kidney disease, or chronic kidney disease (HCC) 10/09/2018       LOS (days): 1  Geometric Mean LOS (GMLOS) (days): 3.4  Days to GMLOS:2.5     OBJECTIVE:  Risk of Unplanned Readmission Score: 66.97     Current admission status: Inpatient   Preferred Pharmacy:   [facility] [place], [place] - [address] [place] [address]  Phone: [phone] Fax: [phone]    [facility] [address] [place] [address] - [address]  [address]  [facility] [address]  Phone: [phone] Fax: [phone]    Primary Care Provider: Lilliana Bliss MD    Primary Insurance: [employer] REP  Secondary Insurance:     DISCHARGE DETAILS:      Other Referral/Resources/Interventions Provided:  Interventions: Transportation  Referral Comments: Contacted by RN that pt has chosen to sign out AMA.  Requesting Lyft ride home.  Lyft waiver signed by pt and placed in scan bin.      Discharge Destination Plan:: Home  Transport at Discharge : Other (Comment) ([place])     Number/Name of Dispatcher: Roundtrip  Transported by (Company and Unit #): [place]  ETA of Transport (Date): 12/09/24  ETA of Transport (Time): 9503

## 2024-12-09 NOTE — NURSING NOTE
IV dcd x 2 pt signed out AMA.  picked pt up at front entrance. Pt took all his belongings with him and was safely placed in car. Pt with no complaints and no distress noted.

## 2024-12-09 NOTE — QUICK NOTE
Patient examined. Noted with moderate tremor, headache, CIWA 11-13. Will proceed with phenobarb loading dose of 260mg. Additional dosing pending response.

## 2024-12-09 NOTE — ASSESSMENT & PLAN NOTE
patient transferred to the intensive care unit evening 12/8/2024 due to increasing oxygen needs  12/7/2024 CT chest PE protocol: no PE but left lower bronchus with mucus plugging and mucus/debris extending from left main stem bronchitis into the left lower lobe bronchus with occlusion  continue aggressive pulmonary toilet with chest PT vest and breathing treatments  patient is currently on high flow oxygen with FiO2 to 45%  consider bronchoscopy for removal

## 2024-12-09 NOTE — ASSESSMENT & PLAN NOTE
patient with chronic atrial fibrillation  rapid rate in the setting of alcohol use and medication noncompliance  continue short acting Cardizem 60 mg Q6 hours at this time, will transition to longer acting once patient is transferred out of the ICU  continue Lopressor 200 mg BID  Continue Eliquis 5 mg bid  will follow rate control strategy

## 2024-12-09 NOTE — ASSESSMENT & PLAN NOTE
Wt Readings from Last 3 Encounters:   12/09/24 84 kg (185 lb 3 oz)   11/28/24 83 kg (182 lb 15.7 oz)   11/25/24 81.2 kg (179 lb)   8/16/2024 TTE: EF visibly estimated 65% with severe left atrial dilatation    shortness of breath most likely due to mucus plugging  monitor I and O, daily weights and labs  CHF education

## 2024-12-09 NOTE — PROGRESS NOTES
Progress Note - Cardiology   Name: Gregg Partida 64 y.o. male I MRN: [id number]  Unit/Bed#: ICU 12 I Date of Admission: 12/7/2024   Date of Service: 12/9/2024 I Hospital Day: 1    Assessment & Plan  Atrial fibrillation (HCC)  patient with chronic atrial fibrillation  rapid rate in the setting of alcohol use and medication noncompliance  continue short acting Cardizem 60 mg Q6 hours at this time, will transition to longer acting once patient is transferred out of the ICU  continue Lopressor 200 mg BID  Continue Eliquis 5 mg bid  will follow rate control strategy  Elevated troponin  high-sensitivity troponins: 116 (0hr), 107 (2hr)  non-MI troponin elevation secondary to rapid heart rate, frequent falls at home and shortness of breath/hypoxia  Alcohol abuse  alcohol level 428  patient on CIWA protocol  Chronic heart failure with preserved ejection fraction (HCC)  Wt Readings from Last 3 Encounters:   12/09/24 84 kg (185 lb 3 oz)   11/28/24 83 kg (182 lb 15.7 oz)   11/25/24 81.2 kg (179 lb)   8/16/2024 TTE: EF visibly estimated 65% with severe left atrial dilatation    shortness of breath most likely due to mucus plugging  monitor I and O, daily weights and labs  CHF education          Acute respiratory failure with hypoxia (HCC)  patient transferred to the intensive care unit evening 12/8/2024 due to increasing oxygen needs  12/7/2024 CT chest PE protocol: no PE but left lower bronchus with mucus plugging and mucus/debris extending from left main stem bronchitis into the left lower lobe bronchus with occlusion  continue aggressive pulmonary toilet with chest PT vest and breathing treatments  patient is currently on high flow oxygen with FiO2 to 45%  consider bronchoscopy for removal  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 disease (HCC)  Lab Results   Component Value Date    HGBA1C 5.2 11/14/2024       Recent Labs     12/08/24  1112 12/08/24  1558 12/08/24  2107 12/09/24  0719   POCGLU 161* 129  157* 140       Blood Sugar Average: Last 72 hrs:  (P) 135.1616042917031852  managed per primary team  Ambulatory dysfunction  secondary to chronic alcohol use      Objective :  Temp:  [97.3 °F (36.3 °C)-99 °F (37.2 °C)] 97.3 °F (36.3 °C)  HR:  [68-86] 86  BP: ()/(55-99) 123/68  Resp:  [18-33] 22  SpO2:  [83 %-98 %] 98 %  O2 Device: High flow nasal cannula  Nasal Cannula O2 Flow Rate (L/min):  [6 L/min-15 L/min] 15 L/min  FiO2 (%):  [45-60] 45  Orthostatic Blood Pressures      Flowsheet Row Most Recent Value   Blood Pressure 123/68 filed at 12/09/2024 0616   Patient Position - Orthostatic VS Lying filed at 12/09/2024 0000          First Weight: Weight - Scale: 87.1 kg (192 lb 0.3 oz) (12/07/24 1545)  Vitals:    12/07/24 2241 12/09/24 0434   Weight: 87.1 kg (192 lb) 84 kg (185 lb 3 oz)     Physical Exam  Vitals and nursing note reviewed.   Constitutional:       Appearance: Normal appearance. He is normal weight. He is ill-appearing (Chronically).   HENT:      Right Ear: External ear normal.      Left Ear: External ear normal.   Neurological:      Mental Status: He is alert.           Lab Results: I have reviewed the following results:  Results from last 7 days   Lab Units 12/09/24  0433 12/08/24  0605 12/07/24  1309   WBC Thousand/uL 6.28 7.45 5.74   HEMOGLOBIN g/dL 10.9* 11.0* 11.6*   HEMATOCRIT % 34.2* 34.8* 35.4*   PLATELETS Thousands/uL 116* 159 192     Results from last 7 days   Lab Units 12/09/24  0433 12/08/24  0605 12/07/24  1309   POTASSIUM mmol/L 4.0 4.7 4.6   CHLORIDE mmol/L 98 105 105   CO2 mmol/L 31 26 25   BUN mg/dL 15 15 16   CREATININE mg/dL 1.04 0.88 1.02   CALCIUM mg/dL 7.9* 8.0* 8.3*     Results from last 7 days   Lab Units 12/07/24  1309   INR  1.07   PTT seconds 29     Lab Results   Component Value Date    HGBA1C 5.2 11/14/2024     Lab Results   Component Value Date    CKTOTAL 258 09/14/2024    CKMB 2.0 04/22/2021    CKMBINDEX <1.0 04/22/2021    TROPONINI <0.02 06/25/2021     Telemetry  demonstrates atrial fibrillation with control ventricular response    VTE Pharmacologic Prophylaxis: VTE covered by:  apixaban, Oral, 5 mg at 12/09/24 0911     VTE Mechanical Prophylaxis: sequential compression device    Abigail TRAN  Cardiology

## 2024-12-09 NOTE — ASSESSMENT & PLAN NOTE
Lab Results   Component Value Date    HGBA1C 5.2 11/14/2024       Recent Labs     12/08/24  1112 12/08/24  1558 12/08/24  2107 12/09/24  0719   POCGLU 161* 129 157* 140       Blood Sugar Average: Last 72 hrs:  (P) [id number]  managed per primary team

## 2024-12-09 NOTE — PHYSICAL THERAPY NOTE
PT cancellation     12/09/24 1633   PT Last Visit   PT Visit Date 12/09/24   Note Type   Note type Cancelled Session   Cancel Reasons Other  (PT evaluation not completed, pt signed out AMA.)   Licensure   [place] License Number  Cassie Dominguez PT [id number]

## 2024-12-09 NOTE — DISCHARGE SUMMARY
"Discharge Summary - Critical Care/ICU   Name: Gregg Partida 64 y.o. male I MRN: [id number]  Unit/Bed#: ICU 12 I Date of Admission: 12/7/2024   Date of Service: 12/9/2024 I Hospital Day: 1    Admission Date: 12/7/2024 1251  Discharge Date: 12/09/24  Admitting Diagnosis: Atrial flutter (HCC) [I48.92]  Alcohol intoxication (HCC) [F10.929]  Hypoxia [R09.02]  Atrial fibrillation with rapid ventricular response (HCC) [I48.91]  Discharge Diagnosis:   Medical Problems       Resolved Problems  Date Reviewed: 12/9/2024   None         HPI: \"Gregg Partida is a 64 y.o. who presented 12/7 with chest pain, found to be in A-fib RVR.  Patient has a past medical history of A-fib on Eliquis, diabetes, alcohol abuse, hypertension, and BPH.     Patient was admitted to [facility] on a Cardizem infusion.  Rate control was obtained, but patient developed worsening hypoxic respiratory failure on 12/8.  Continuous IV fluids were discontinued and pulmonary was consulted this afternoon.       Patient escalated to 15 L mid flow nasal cannula and was therefore transferred to ICU for higher level of care and high flow nasal cannula.\"    Procedures Performed:   Orders Placed This Encounter   Procedures    Critical Care    ED ECG Documentation Only       Summary of Hospital Course:  once admitted to the SD unit, he was placed on HFNC. He was given lasix with improvement in his O2 needs after diuresis. He received 1 dose of phenobarb last night secondary to evidence of alcohol withdrawal. Today he was weaned to 3L NC. Vital signs were otherwise stable. HR well controlled on oral metoprolol and oral cardizem. This afternoon, patient decided to leave against medical advice. He was informed of risks of leaving AMA and could verbalize risks. A prescription for cardizem 60mg Q8 was ordered for him. He was instructed to continue taking his medications as prescribed. A lyft ride was called for him and he was escorted to his ride via wheelchair. "     Significant Findings, Care, Treatment and Services Provided:   12/7: Admit FM: afib RVR >cardizem infusion  12/8: MFNC > HFNC; tx ICU: lasix    Complications: None     Condition at Discharge: fair       Discharge instructions/Information to patient and family:   See After Visit Summary (AVS) for information provided to patient and family.      Provisions for Follow-Up Care:  See after visit summary for information related to follow-up care and any pertinent home health orders.      PCP: Lilliana Bliss MD    Disposition: Left against medical advice    Planned Readmission: No     Discharge Medications:  See after visit summary for reconciled discharge medications provided to patient and family.      Discharge Statement:  I have spent a total time of 30 minutes in caring for this patient on the day of the visit/encounter. .

## 2024-12-09 NOTE — ASSESSMENT & PLAN NOTE
Wt Readings from Last 3 Encounters:   12/09/24 84 kg (185 lb 3 oz)   11/28/24 83 kg (182 lb 15.7 oz)   11/25/24 81.2 kg (179 lb)   Weight gain as outlined above  Had 40mg lasix yesterday for concern of fluid overload/weight gain and hypoxia  I&O's, daily weights

## 2024-12-09 NOTE — UTILIZATION REVIEW
OBS 12/7 @ 1758 UPGRADED TO INPATIENT 12/8 @ 1806 FOR CONTINUED TX OF ACUTE RESPIRATORY FAILURE WITH HYPOXIA 2/2 ATRIAL FIB     Admission Orders (From admission, onward)       Ordered        12/08/24 1806  INPATIENT ADMISSION  Once            12/07/24 1758  Place in Observation  Once                     Orders Placed This Encounter   Procedures    INPATIENT ADMISSION     Cosign if agree. Increasing O2 requirements, in ICU.  Will exceed 2MN.     Standing Status:   Standing     Number of Occurrences:   1     Level of Care:   Critical Care [15]     Estimated length of stay:   More than 2 Midnights     Certification:   I certify that inpatient services are medically necessary for this patient for a duration of greater than two midnights. See H&P and MD Progress Notes for additional information about the patient's course of treatment.     Continued Stay Review    SEE INITIAL REVIEW AT BOTTOM    Date: 12/9/24                          Current Patient Class: Inpatient  Current Level of Care: critical care    HPI:64 y.o. male initially admitted on 12/7 obs to inpatient 12/8 with chest pain 2/2 A-fib RVR     Assessment/Plan: breath sounds remain decreased. Currently requiring HFNC 30L, 50%. S/p 40 mg Lasix yesterday with robust diuresis- 2.75 L yesterday. Continue supportive care - Mucinex, nebs. Cardizem, metoprolol and Eliquis. Telemetry. Monitor I/Os, daily wts. CIWA monitoring. SCDs.     Medications:   Scheduled Medications:  apixaban, 5 mg, Oral, BID  aspirin, 81 mg, Oral, Daily  chlorhexidine, 15 mL, Mouth/Throat, Q12H SEDA  diltiazem, 60 mg, Oral, Q6H SEDA  ferrous sulfate, 325 mg, Oral, Daily With Breakfast  fluticasone-vilanterol, 1 puff, Inhalation, Daily  folic acid, 1,000 mcg, Oral, Daily  gabapentin, 300 mg, Oral, TID  guaiFENesin, 600 mg, Oral, Q12H SEDA  insulin lispro, 1-5 Units, Subcutaneous, 4x Daily (AC & HS)  levalbuterol, 0.63 mg, Nebulization, TID  metoprolol tartrate, 200 mg, Oral, Q12H SEDA  nicotine, 1  patch, Transdermal, Daily  pantoprazole, 40 mg, Oral, BID AC  ranolazine, 500 mg, Oral, Q12H  sodium chloride, 4 mL, Nebulization, TID  tamsulosin, 0.4 mg, Oral, Daily  vitamin B-1, 100 mg, Oral, Daily    PRN Meds:  guaiFENesin, 200 mg, Oral, Q4H PRN  levalbuterol, 0.63 mg, Nebulization, Q4H PRN  senna-docusate sodium, 1 tablet, Oral, Daily PRN      Discharge Plan: TBD    Vital Signs (last 3 days)       Date/Time Temp Pulse Resp BP MAP (mmHg) SpO2 FiO2 (%) Calculated FIO2 (%) - Nasal Cannula O2 Flow Rate (L/min) Nasal Cannula O2 Flow Rate (L/min) O2 Device O2 Interface Device Patient Position - Orthostatic VS Joseph Coma Scale Score CIWA-Ar Total Pain    12/09/24 1111 -- 64 28 99/64 73 96 % -- -- -- -- -- -- -- -- -- --    12/09/24 1108 -- -- -- -- -- 97 % -- -- 3 L/min -- Mid flow nasal cannula  -- -- -- -- --    12/09/24 1102 96.8 °F (36 °C) -- -- -- -- -- -- -- -- -- -- -- -- -- -- --    12/09/24 1000 -- 64 17 111/67 84 98 % -- -- -- -- -- -- -- -- -- --    12/09/24 0900 -- 64 17 105/63 79 99 % -- -- -- -- -- -- -- -- -- --    12/09/24 0800 -- 66 21 98/55 69 96 % -- -- -- -- -- -- -- 15 -- No Pain    12/09/24 0722 -- 66 20 -- -- 98 % 45 -- 30 L/min -- High flow nasal cannula HFNC prongs -- -- -- No Pain    12/09/24 0720 98.5 °F (36.9 °C) -- -- -- -- -- -- -- -- -- -- -- -- -- -- --    12/09/24 0700 -- 66 21 106/65 80 94 % -- -- -- -- -- -- -- -- -- --    12/09/24 0616 -- -- -- 123/68 -- -- -- -- -- -- -- -- -- -- -- --    12/09/24 0600 -- 86 22 122/69 90 95 % -- -- -- -- -- -- -- -- -- --    12/09/24 0500 -- 68 18 108/64 81 95 % -- -- -- -- -- -- -- -- -- --    12/09/24 0434 98.7 °F (37.1 °C) -- -- -- -- -- -- -- -- -- -- -- -- -- -- --    12/09/24 0400 -- 71 19 101/60 76 95 % -- -- -- -- -- -- -- 15 -- --    12/09/24 0326 -- -- -- -- -- 96 % 50 -- 35 L/min -- High flow nasal cannula HFNC prongs -- -- -- --    12/09/24 0300 -- 72 19 95/55 70 97 % -- -- -- -- -- -- -- -- -- --    12/09/24 0200 -- 72 23 123/68 90  95 % -- -- -- -- -- -- -- -- -- --    12/09/24 0109 -- -- -- 133/77 -- -- -- -- -- -- -- -- -- -- -- --    12/09/24 0100 -- 73 21 133/77 100 97 % -- -- -- -- -- -- -- -- -- --    12/09/24 0000 99 °F (37.2 °C) 73 20 97/57 72 97 % -- -- -- -- -- -- Lying 15 -- No Pain    12/08/24 2300 -- 73 20 96/55 72 94 % -- -- -- -- -- -- -- -- -- --    12/08/24 2200 -- 73 22 105/56 76 94 % -- -- -- -- -- -- -- -- -- --    12/08/24 2103 -- 74 -- 112/73 -- -- -- -- -- -- -- -- -- -- -- --    12/08/24 2000 98.8 °F (37.1 °C) 74 33 142/95 113 97 % -- -- -- -- -- -- Sitting 15 -- No Pain    12/08/24 1958 -- 74 -- 155/86 -- -- -- -- -- -- -- -- -- -- 11 --    12/08/24 1912 -- -- -- -- -- 97 % 60 -- 35 L/min -- High flow nasal cannula HFNC prongs -- -- -- --    12/08/24 1800 -- 72 26 155/86 114 98 % -- -- -- -- -- -- -- -- -- --    12/08/24 1715 98.3 °F (36.8 °C) 73 28 155/93 118 96 % 60 -- 35 L/min -- High flow nasal cannula -- Lying -- 13 No Pain    12/08/24 1705 -- -- -- -- -- 97 % 60 -- 35 L/min -- High flow nasal cannula HFNC prongs -- -- -- --    12/08/24 16:45:06 98.6 °F (37 °C) 72 -- 147/99 115 93 % -- -- -- -- -- -- -- -- -- --    12/08/24 1640 -- -- 26 -- -- 89 % -- 80 -- 15 L/min Mid flow nasal cannula -- -- -- -- --    12/08/24 1620 -- -- -- -- -- 83 % -- 80 -- 15 L/min Mid flow nasal cannula -- -- -- -- --    12/08/24 1529 -- -- -- -- -- 93 % -- 64 -- 11 L/min Mid flow nasal cannula -- -- -- -- --    12/08/24 14:32:10 -- 71 -- 137/84 102 96 % -- -- -- -- -- -- -- -- 5 --    12/08/24 1315 -- -- -- -- -- 98 % -- -- -- -- -- -- -- -- -- --    12/08/24 1305 -- -- -- -- -- 89 % -- 68 -- 12 L/min Mid flow nasal cannula -- -- -- -- --    12/08/24 12:42:32 -- 71 20 136/79 98 92 % -- 60 -- 10 L/min Mid flow nasal cannula -- Lying -- -- No Pain    12/08/24 1135 -- -- -- -- -- 87 % -- 44 -- 6 L/min Nasal cannula -- -- -- -- --    12/08/24 10:14:16 -- 75 -- 139/86 104 88 % -- -- -- -- -- -- -- -- 3 --    12/08/24 0927 -- -- -- -- -- 99 %  -- -- -- -- -- -- -- -- -- --    12/08/24 0917 -- -- -- -- -- 91 % -- 32 -- 3 L/min Nasal cannula -- -- -- -- --    12/08/24 08:57:19 -- 93 -- 158/80 106 95 % -- -- -- -- -- -- -- -- 8 --    12/08/24 0800 -- 71 -- 133/78 -- -- -- 40 -- 5 L/min Nasal cannula -- -- 15 8 --    12/08/24 07:32:38 98.5 °F (36.9 °C) 71 22 133/75 94 91 % -- 40 -- 5 L/min Nasal cannula -- Lying -- -- No Pain       Weight (last 2 days)       Date/Time Weight    12/09/24 0434 84 (185.19)    12/07/24 22:41:09 87.1 (192)    12/07/24 1545 87.1 (192.02)           CIWA-Ar Score       Row Name 12/08/24 1958 12/08/24 1715 12/08/24 14:32:10       CIWA-Ar    /86 -- --    Pulse 74 -- --    Nausea and Vomiting 0 0 0    Tactile Disturbances 0 0 0    Tremor 4 5 2    Auditory Disturbances 0 0 0    Paroxysmal Sweats 0 1 0    Visual Disturbances 0 0 0    Anxiety 4 4 0    Headache, Fullness in Head 3 2 2    Agitation 0 1 1    Orientation and Clouding of Sensorium 0 0 0    CIWA-Ar Total 11 13 5      Row Name 12/08/24 10:14:16 12/08/24 08:57:19 12/08/24 0800       CIWA-Ar    BP -- -- 133/78    Pulse -- -- 71    Nausea and Vomiting 0 0 0    Tactile Disturbances 0 0 0    Tremor 2 4 4    Auditory Disturbances 0 0 0    Paroxysmal Sweats 0 1 1    Visual Disturbances 0 0 0    Anxiety 0 0 0    Headache, Fullness in Head 1 2 2    Agitation 0 1 1    Orientation and Clouding of Sensorium 0 0 0    CIWA-Ar Total 3 8 8      Row Name 12/08/24 0700 12/08/24 0300 12/07/24 2300        Pertinent Labs/Diagnostic Results:   Results from last 7 days   Lab Units 12/09/24  0433 12/08/24  0605 12/07/24  1309   WBC Thousand/uL 6.28 7.45 5.74   HEMOGLOBIN g/dL 10.9* 11.0* 11.6*   HEMATOCRIT % 34.2* 34.8* 35.4*   PLATELETS Thousands/uL 116* 159 192   TOTAL NEUT ABS Thousands/µL 4.44 5.51 3.24       Results from last 7 days   Lab Units 12/09/24  0433 12/08/24  0605 12/07/24  1309   SODIUM mmol/L 134* 140 144   POTASSIUM mmol/L 4.0 4.7 4.6   CHLORIDE mmol/L 98 105 105   CO2 mmol/L 31  26 25   ANION GAP mmol/L 5 9 14*   BUN mg/dL 15 15 16   CREATININE mg/dL 1.04 0.88 1.02   EGFR ml/min/1.73sq m 76 92 78   CALCIUM mg/dL 7.9* 8.0* 8.3*   MAGNESIUM mg/dL 1.6* 1.6* 1.6*     Results from last 7 days   Lab Units 12/09/24  0433 12/08/24  0605 12/07/24  1309   AST U/L 22 35 46*   ALT U/L 20 28 31   ALK PHOS U/L 91 112* 108*   TOTAL PROTEIN g/dL 6.2* 6.6 7.0   ALBUMIN g/dL 3.3* 3.6 3.6   TOTAL BILIRUBIN mg/dL 0.71 0.76 0.51     Results from last 7 days   Lab Units 12/09/24  1059 12/09/24  0719 12/08/24  2107 12/08/24  1558 12/08/24  1112 12/08/24  0714 12/07/24  2132   POC GLUCOSE mg/dl 126 140 157* 129 161* 116 108     Results from last 7 days   Lab Units 12/09/24  0433 12/08/24  0605 12/07/24  1309   GLUCOSE RANDOM mg/dL 118 122 77             Network Utilization Review Department  ATTENTION: Please call with any questions or concerns to [phone] and carefully listen to the prompts so that you are directed to the right person. All voicemails are confidential.   For Discharge needs, contact Care Management DC Support Team at [phone] opt. [phone]  Send all requests for admission clinical reviews, approved or denied determinations and any other requests to dedicated fax number below belonging to the campus where the patient is receiving treatment. List of dedicated fax numbers for the Facilities:  FACILITY NAME UR FAX NUMBER   ADMISSION DENIALS (Administrative/Medical Necessity) [phone]   DISCHARGE SUPPORT TEAM (NETWORK) [phone]   PARENT CHILD HEALTH (Maternity/NICU/Pediatrics) [phone]   [facility] [phone]   [facility] [phone]   [facility] [phone]   [facility] [phone]   [facility] [phone]   [facility] [phone]   [facility] [phone]   [facility]  [place] [phone]   [facility] [phone]   [facility] [phone]   [facility] [phone]   [facility] [phone]

## 2024-12-09 NOTE — PROGRESS NOTES
Progress Note - Critical Care/ICU   Name: Gregg Partida 64 y.o. male I MRN: [id number]  Unit/Bed#: ICU 12 I Date of Admission: 12/7/2024   Date of Service: 12/9/2024 I Hospital Day: 1      Assessment & Plan  Acute respiratory failure with hypoxia (HCC)  Patient presented 12/7 with chest pain secondary to A-fib RVR  He was admitted to family medicine, required 2 L nasal cannula on admission  Escalating oxygen requirements today up to 15 L mid flow nasal cannula  12/7: CTA PE study revealed: Left lower lobe bronchitis with mucous plugging and mucus/debris extending from the left mainstem bronchus into the left lower lobe bronchus with occlusion   12/8: CXR revealed central congestion without acute consolidation    Plan:   Continues on high flow nasal cannula 30L 50%  S/p 40 mg Lasix yesterday with robust diuresis- 2.75 L yesterday   Continue airway clearance  Mucinex  Nebs  Atrial fibrillation (HCC)  Presented in RVR, now rate controlled  Outpatient regimen consist of Eliquis, digoxin, metoprolol  Patient was started on Cardizem by cardiology  Continue metoprolol tartrate 200 mg twice daily, Cardizem 60 mg every 6, and Eliquis  Chronic heart failure with preserved ejection fraction (HCC)  Wt Readings from Last 3 Encounters:   12/09/24 84 kg (185 lb 3 oz)   11/28/24 83 kg (182 lb 15.7 oz)   11/25/24 81.2 kg (179 lb)   Weight gain as outlined above  Had 40mg lasix yesterday for concern of fluid overload/weight gain and hypoxia  I&O's, daily weights  COPD, severity to be determined (HCC)  No PFTs on file  Plan as outlined above  Hypertension  Outpatient regimen consist of: amlodipine  Consider resuming  Alcohol abuse  Scheduled folic acid, thiamine  Received phenobarbital 65 mg last evening which did not improve CIWA scores. Had additional 260mg last evening for headache, anxiety, agitation. Slept well thereafter  CIWA monitoring  Mucus plugging of bronchi  Airway clearance protocol  Rest of plan as outlined  above  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 disease (HCC)  Hold oral diabetic agents  Continue sliding scale insulin with Accu-Cheks  Elevated troponin  High-sensitivity troponin 116 > 107 on admission  Likely secondary to A-fib RVR  Cardiology was consulted on admission  Ambulatory dysfunction  PT/OT when appropriate  BPH (benign prostatic hyperplasia)  Flomax  Disposition: Stepdown Level 1    ICU Core Measures     A: Assess, Prevent, and Manage Pain Has pain been assessed? Yes  Need for changes to pain regimen? No   B: Both SAT/SAT  N/A   C: Choice of Sedation RASS Goal: N/A patient not on sedation  Need for changes to sedation or analgesia regimen? NA   D: Delirium CAM-ICU: Negative   E: Early Mobility  Plan for early mobility? Yes   F: Family Engagement Plan for family engagement today? Yes         Prophylaxis:  VTE VTE covered by:  apixaban, Oral, 5 mg at 12/08/24 1728       Stress Ulcer  covered bypantoprazole (PROTONIX) 40 mg tablet [[id number]] (Long-Term Med), pantoprazole (PROTONIX) EC tablet 40 mg [[id number]]         24 Hour Events : Brought to ICU for HFNC yesterday after experiencing hypoxia on GMF. Pt was given 40mg IV furosemide and diuresed well. Last evening his CIWA began to climb due to agitation, tremor, confusion, headache. He received a dose of 65mg IV phenobarbital with no significant improvement in symptoms. Later in the evening he received 260mg IV phenobarbitol and slept thereafter. HR remains controlled. No other changes.    Subjective   Review of Systems: See HPI for Review of Systems    Objective :                   Vitals I/O      Most Recent Min/Max in 24hrs   Temp (!) 97.3 °F (36.3 °C) Temp  Min: 97.3 °F (36.3 °C)  Max: 99 °F (37.2 °C)   Pulse 86 Pulse  Min: 68  Max: 93   Resp 22 Resp  Min: 18  Max: 33   /68 BP  Min: 95/55  Max: 158/80   O2 Sat 98 % SpO2  Min: 83 %  Max: 99 %      Intake/Output Summary (Last 24 hours) at 12/9/2024 9036  Last data filed at  2024 0433  Gross per 24 hour   Intake 1556 ml   Output 2750 ml   Net -1194 ml       Diet Vlad/CHO Controlled; Consistent Carbohydrate Diet Level 2 (5 carb servings/75 grams CHO/meal)    Invasive Monitoring   N/A        Physical Exam   Physical Exam  Eyes:      General: Lids are normal.   Skin:     General: Skin is warm and dry.   HENT:      Head: Normocephalic and atraumatic.   Cardiovascular:      Pulses:           Radial pulses are 2+ on the right side and 2+ on the left side.   Musculoskeletal:      Right lower le+ Edema present.      Left lower le+ Edema present.   Abdominal: General: Bowel sounds are normal.      Palpations: Abdomen is soft.   Constitutional:       General: He is not in acute distress.     Appearance: He is ill-appearing. He is not toxic-appearing.      Interventions: Nasal cannula in place.      Comments: Disheveled     Pulmonary:      Effort: Pulmonary effort is normal.      Breath sounds: Decreased breath sounds present.   Neurological:      General: No focal deficit present.      Mental Status: He is alert. He is disoriented to time.      GCS: GCS eye subscore is 4. GCS verbal subscore is 4. GCS motor subscore is 6.      Cranial Nerves: No facial asymmetry.      Motor: Strength full and intact in all extremities.          Diagnostic Studies        Lab Results: I have reviewed the following results: See below     Medications:  Scheduled PRN   apixaban, 5 mg, BID  aspirin, 81 mg, Daily  chlorhexidine, 15 mL, Q12H SEDA  diltiazem, 60 mg, Q6H SEDA  ferrous sulfate, 325 mg, Daily With Breakfast  fluticasone-vilanterol, 1 puff, Daily  folic acid, 1,000 mcg, Daily  gabapentin, 300 mg, TID  guaiFENesin, 600 mg, Q12H SEDA  insulin lispro, 1-5 Units, 4x Daily (AC & HS)  levalbuterol, 0.63 mg, TID  magnesium sulfate, 4 g, Once  metoprolol tartrate, 200 mg, Q12H SEDA  nicotine, 1 patch, Daily  pantoprazole, 40 mg, BID AC  ranolazine, 500 mg, Q12H  sodium chloride, 4 mL, TID  tamsulosin, 0.4 mg,  Daily  vitamin B-1, 100 mg, Daily      guaiFENesin, 200 mg, Q4H PRN  levalbuterol, 0.63 mg, Q4H PRN  senna-docusate sodium, 1 tablet, Daily PRN       Continuous          Labs:   CBC    Recent Labs     12/08/24  0605 12/09/24  0433   WBC 7.45 6.28   HGB 11.0* 10.9*   HCT 34.8* 34.2*    116*     BMP    Recent Labs     12/08/24  0605 12/09/24  0433   SODIUM 140 134*   K 4.7 4.0    98   CO2 26 31   AGAP 9 5   BUN 15 15   CREATININE 0.88 1.04   CALCIUM 8.0* 7.9*       Coags    Recent Labs     12/07/24  1309   INR 1.07   PTT 29        Additional Electrolytes  Recent Labs     12/08/24  0605 12/09/24  0433   MG 1.6* 1.6*          Blood Gas    No recent results  No recent results LFTs  Recent Labs     12/08/24  0605 12/09/24  0433   ALT 28 20   AST 35 22   ALKPHOS 112* 91   ALB 3.6 3.3*   TBILI 0.76 0.71       Infectious  No recent results  Glucose  Recent Labs     12/07/24  1309 12/08/24  0605 12/09/24  0433   GLUC 77 122 118

## 2024-12-10 ENCOUNTER — RESULTS FOLLOW-UP (OUTPATIENT)
Dept: EMERGENCY DEPT | Facility: HOSPITAL | Age: 64
End: 2024-12-10

## 2024-12-10 ENCOUNTER — TRANSITIONAL CARE MANAGEMENT (OUTPATIENT)
Age: 64
End: 2024-12-10

## 2024-12-10 LAB — MRSA NOSE QL CULT: NORMAL

## 2024-12-10 NOTE — PROCEDURES
Universal Protocol:  Consent: Verbal consent obtained.  Consent given by: patient  Patient understanding: patient states understanding of the procedure being performed  Patient identity confirmed: verbally with patient  Laceration repair    Date/Time: 12/9/2024 8:14 PM    Performed by: Kale Knott PA-C  Authorized by: Kale Knott PA-C  Body area: head/neck  Location details: scalp  Laceration length: 0.5 cm      Procedure Details:  Irrigation solution: saline  Irrigation method: syringe  Skin closure: glue  Approximation: close  Patient tolerance: patient tolerated the procedure well with no immediate complications

## 2024-12-10 NOTE — ED PROVIDER NOTES
Emergency Department Airway Evaluation and Management Form    History  Obtained from: Patient, EMS  Patient has no allergy information on record.  Chief Complaint   Patient presents with    Fall     Tripped and fell, + head strike +thinners +etoh     HPI    Patient presents as a prehospital trauma level B activation due to trip, fall, head strike, blood thinners, alcohol use.    Past Medical History:   Diagnosis Date    Alcohol abuse     Atrial fibrillation (HCC)      Past Surgical History:   Procedure Laterality Date    CORONARY ARTERY BYPASS GRAFT       No family history on file.  Social History     Tobacco Use    Smoking status: Every Day     Types: Cigarettes    Smokeless tobacco: Never   Substance Use Topics    Alcohol use: Yes    Drug use: Not Currently     I have reviewed and agree with the history as documented.    Review of Systems    Per trauma    Physical Exam  BP 93/55   Pulse 63   Temp 99.4 °F (37.4 °C) (Oral)   Resp 20   Wt 90 kg (198 lb 6.6 oz)   SpO2 92%     Physical Exam    Per trauma    ED Medications  Medications - No data to display    Intubation  Procedures    Notes  Airway intact, equal, bilateral breath sounds.  Additional workup, documentation, disposition per trauma team who is at bedside in trauma bay for trauma level B activation.    Final Diagnosis  Final diagnoses:   Fall, initial encounter       ED Provider  Electronically Signed by     Srinivasa La MD  12/09/24 5493

## 2024-12-10 NOTE — H&P
H&P - Trauma   Name: Srinivasa Marrufo 64 y.o. male I MRN: [id number]  Unit/Bed#: ED-16 I Date of Admission: 12/9/2024   Date of Service: 12/9/2024 I Hospital Day: 0     Assessment & Plan  Fall  - Mechanical fall while intoxicated with alcohol  - Left AMA from [facility] ICU earlier today, was there for a fib w RVR and acute respiratory failure  - FAST indicates pleural effusion   - CXR shows acute left sided rib fractures   - Nontender   - GCS 15, non focal  - Reports last drink was 2 hours prior to fall  - Clinically sober  Head injury  - Tiny posterior scalp laceration and abrasion, no active bleeding  - CT Head, C-spine negative  - Adhesive applied to laceration  Hypotension  SBP 80-90 systolic  - Recommend admission for hypotension, rib fractures, high risk of respiratory distress and decompensation  - Patient adamantly refusing admission and signed papers to leave against medical advice    Cervical Collar Clearance:    The patient had a CT scan of the cervical spine demonstrating no acute injury. On exam, the patient had no midline point tenderness or paresthesias/numbness/weakness in the extremities. The patient had full range of motion (was then able to flex, extend, and rotate head laterally) without pain. There were no distracting injuries and the patient was not intoxicated.      The patient's cervical spine was cleared radiologically and clinically. Cervical collar removed at this time.     Kale Knott PA-C  12/9/2024 8:01 PM       Trauma Alert: Level B   Model of Arrival: Ambulance    Trauma Team: Attending To and LISET Knott  Consultants: n/a     History of Present Illness   Chief Complaint: Scalp lac  Mechanism:Fall     Srinivasa Marrufo is a 64 y.o. male who presents after a fall with posterior head strike, no LOC, takes eliquis daily. He reports he is only here for stitches. He denies headaches or dizziness. Reports he drank alcohol tonight.    Review of Systems   Constitutional:  Negative for  chills and fever.   HENT:  Negative for facial swelling.    Eyes:  Negative for photophobia.   Respiratory:  Negative for shortness of breath.    Cardiovascular:  Negative for chest pain.   Gastrointestinal:  Negative for abdominal pain and nausea.   Musculoskeletal:  Negative for back pain and neck pain.   Skin:  Positive for wound.   Neurological:  Negative for dizziness, syncope and headaches.   Hematological:  Negative for adenopathy.   Psychiatric/Behavioral:  Negative for confusion.      I have reviewed the patient's PMH, PSH, Social History, Family History, Meds, and Allergies    There is no immunization history on file for this patient.  Last Tetanus: 2020       Objective :  Temp:  [99.4 °F (37.4 °C)] 99.4 °F (37.4 °C)  HR:  [63-83] 83  BP: ()/(49-55) 87/55  Resp:  [18-20] 18  SpO2:  [90 %-92 %] 90 %  O2 Device: None (Room air)    Initial Vitals:   Temperature: 99.4 °F (37.4 °C) (12/09/24 1911)  Pulse: 68 (12/09/24 1911)  Respirations: 20 (12/09/24 1911)  Blood Pressure: 90/55 (12/09/24 1911)    Primary Survey:   Airway:        Status: patent;        Pre-hospital Interventions: none        Hospital Interventions: none  Breathing:        Pre-hospital Interventions: none       Effort: normal       Right breath sounds: normal       Left breath sounds: normal  Circulation:        Rhythm: regular       Rate: regular   Right Pulses Left Pulses    R radial: 2+    R pedal: 2+     L radial: 2+    L pedal: 2+       Disability:        GCS: Eye: 4; Verbal: 5 Motor: 6 Total: 15       Right Pupil: round;  reactive         Left Pupil:  round;  reactive      R Motor Strength L Motor Strength    R : 5/5  R dorsiflex: 5/5  R plantarflex: 5/5 L : 5/5  L dorsiflex: 5/5  L plantarflex: 5/5        Sensory:  No sensory deficit  Exposure:       Completed: Yes      Secondary Survey:  Physical Exam  Vitals reviewed.   Constitutional:       Comments: Smells of alcohol, poor hygiene   HENT:      Head: Normocephalic.       Comments: Posterior scalp wound     Right Ear: External ear normal.      Left Ear: External ear normal.      Nose: Nose normal.      Mouth/Throat:      Mouth: Mucous membranes are moist.      Pharynx: Oropharynx is clear.   Eyes:      Extraocular Movements: Extraocular movements intact.      Conjunctiva/sclera: Conjunctivae normal.      Pupils: Pupils are equal, round, and reactive to light.   Cardiovascular:      Rate and Rhythm: Normal rate. Rhythm irregular.      Pulses: Normal pulses.      Heart sounds: Normal heart sounds.   Pulmonary:      Effort: Pulmonary effort is normal. No respiratory distress.      Breath sounds: Normal breath sounds.   Chest:      Chest wall: No tenderness.   Abdominal:      General: Abdomen is flat. Bowel sounds are normal. There is distension.      Palpations: Abdomen is soft.      Tenderness: There is no abdominal tenderness. There is no right CVA tenderness, left CVA tenderness or guarding.   Musculoskeletal:         General: No swelling, tenderness, deformity or signs of injury. Normal range of motion.      Cervical back: No tenderness.   Skin:     General: Skin is warm and dry.      Capillary Refill: Capillary refill takes less than 2 seconds.      Findings: No bruising or lesion.   Neurological:      General: No focal deficit present.      Mental Status: He is alert and oriented to person, place, and time. Mental status is at baseline.      Sensory: No sensory deficit.      Motor: No weakness.   Psychiatric:      Comments: intoxicated             Lab Results: I have reviewed the following results:  Recent Labs     12/09/24 1919   HGB 9.9*   HCT 29*   CO2 18*   CAIONIZED 0.99*       Imaging Results: I have personally reviewed pertinent images saved in PACS. CT scan findings (and other pertinent positive findings on images) were discussed with radiology. My interpretation of the images/reports are as follows:  Chest Xray(s): positive for acute findings: L sided rib fx   FAST  exam(s): Shows L pleural effusion   CT Scan(s): negative for acute findings   Additional Xray(s): N/A     Other Studies: Other Study Results Review: No additional pertinent studies reviewed.

## 2024-12-10 NOTE — ASSESSMENT & PLAN NOTE
- Mechanical fall while intoxicated with alcohol  - Left AMA from [facility] ICU earlier today, was there for a fib w RVR and acute respiratory failure  - FAST indicates pleural effusion   - CXR shows acute left sided rib fractures   - Nontender   - GCS 15, non focal  - Reports last drink was 2 hours prior to fall  - Clinically sober

## 2024-12-10 NOTE — DISCHARGE INSTR - AVS FIRST PAGE
Traumatic Scalp Laceration and Wound Care Instructions:     Wound Care:  - Wash laceration/wound daily, gently in the shower, do not scrub. Pat dry with clean towel. Do NOT immerse completely in water (i.e. tub or swimming pool) until cleared by trauma.  - Your wound was glued and this will fall off over 1-2 weeks, do not pick   - Call office if you develop fever/chills, redness/swelling/drainage from the site.      Traumatic Rib Fracture Discharge Instructions:    Your rib fractures will take time to heal. Rib fractures typically take at least 6-8 weeks to heal and may take longer.    Activity:  - Walking and normal light activities are encouraged. Normal daily activities including climbing steps are okay.  - Avoid lifting greater than 10 pounds, any strenuous activities and/or exercise, and contact sports until cleared by the trauma service.  - Continue using the incentive spirometer at least 10 times every hour while awake.    Return to work:    - You may return to work once you are cleared by the trauma service.    Medications:    - You should continue your current medication regimen after discharge unless otherwise instructed. Please refer to your discharge medication list for further details.  - Please take the pain medications as directed.  - You are encouraged to use non-narcotic pain medications first and whenever possible. Reserve the use of narcotic pain medication for moderate to severe pain not controlled by non-narcotic medications.  - No driving while taking narcotic pain medications.  - You may become constipated, especially if taking pain medications. You may take any over the counter stool softeners or laxatives as needed. Examples: Milk of Magnesia, Colace, Senna.    Additional Instructions:  - If you have any questions or concerns after discharge please call the office.  - Call office or return to ER if fever greater than 101, chills, worsening/uncontrollable pain, develop productive cough,  increasing shortness of breath, and/or difficulty breathing.

## 2024-12-10 NOTE — ASSESSMENT & PLAN NOTE
- Tiny posterior scalp laceration and abrasion, no active bleeding  - CT Head, C-spine negative  - Adhesive applied to laceration

## 2024-12-10 NOTE — PROCEDURES
POC FAST US    Date/Time: 12/9/2024 7:35 PM    Performed by: Kale Knott PA-C  Authorized by: Kale Knott PA-C    Patient location:  Trauma  Procedure details:     Exam Type:  Diagnostic    Indications: blunt abdominal trauma and blunt chest trauma      Assess for:  Intra-abdominal fluid and pericardial effusion    Technique: FAST      Views obtained:  Heart - Pericardial sac, LUQ - Splenorenal space, Suprapubic - Pouch of Ruddy and RUQ - Jay's Pouch    Image quality: diagnostic      Image availability:  Images available in PACS and video obtained  FAST Findings:     RUQ (Hepatorenal) free fluid: absent      LUQ (Splenorenal) free fluid: absent      Suprapubic free fluid: absent      Cardiac wall motion: identified      Pericardial effusion: absent    extended FAST (Pulmonary) findings:     Left pleural effusion: Present    Interpretation:     Impressions: negative      Positive findings: left pleural effusion present    Comments:      Small pleural effusion on the Left

## 2024-12-10 NOTE — TRAUMA DOCUMENTATION
Provider at bedside to remove c-collar, pt demanding to leave AMA. Pt signing AMA forms with JAZMINE Gonzales.

## 2024-12-10 NOTE — UTILIZATION REVIEW
NOTIFICATION OF ADMISSION DISCHARGE   This is a Notification of Discharge from [facility]. Please be advised that this patient has been discharge from our facility. Below you will find the admission and discharge date and time including the patient’s disposition.   UTILIZATION REVIEW CONTACT:  Lashon James  Utilization   Network Utilization Review Department  Phone: [phone] x carefully listen to the prompts. All voicemails are confidential.  Email: [email or web address]     ADMISSION INFORMATION  PRESENTATION DATE: 12/7/2024 12:51 PM  OBERVATION ADMISSION DATE: 12/07/2024 1759  INPATIENT ADMISSION DATE: 12/8/24  6:06 PM   DISCHARGE DATE: 12/9/2024  4:05 PM   DISPOSITION:Left against medical advice or discontinued care    Network Utilization Review Department  ATTENTION: Please call with any questions or concerns to [phone] and carefully listen to the prompts so that you are directed to the right person. All voicemails are confidential.   For Discharge needs, contact Care Management DC Support Team at [phone] opt. 2  Send all requests for admission clinical reviews, approved or denied determinations and any other requests to dedicated fax number below belonging to the campus where the patient is receiving treatment. List of dedicated fax numbers for the Facilities:  FACILITY NAME UR FAX NUMBER   ADMISSION DENIALS (Administrative/Medical Necessity) [phone]   DISCHARGE SUPPORT TEAM ([facility]) [phone]   PARENT CHILD HEALTH (Maternity/NICU/Pediatrics) [phone]   [facility] [phone]   [facility] [phone]   [facility] [phone]   [facility] [phone]   [facility] [phone]   [facility] [phone]   [facility] [phone]   [facility]  [facility] [phone]   [facility] [phone]   [facility] [phone]   [facility] [phone]   [facility] [phone]

## 2024-12-10 NOTE — ASSESSMENT & PLAN NOTE
SBP 80-90 systolic  - Recommend admission for hypotension, rib fractures, high risk of respiratory distress and decompensation  - Patient adamantly refusing admission and signed papers to leave against medical advice

## 2024-12-11 ENCOUNTER — TELEPHONE (OUTPATIENT)
Age: 64
End: 2024-12-11

## 2024-12-11 ENCOUNTER — HOSPITAL ENCOUNTER (OUTPATIENT)
Facility: HOSPITAL | Age: 64
Setting detail: OBSERVATION
Discharge: HOME | End: 2024-12-12
Attending: EMERGENCY MEDICINE | Admitting: FAMILY MEDICINE
Payer: COMMERCIAL

## 2024-12-11 ENCOUNTER — APPOINTMENT (EMERGENCY)
Dept: RADIOLOGY | Facility: HOSPITAL | Age: 64
End: 2024-12-11
Payer: COMMERCIAL

## 2024-12-11 DIAGNOSIS — F10.10 ETOH ABUSE: ICD-10-CM

## 2024-12-11 DIAGNOSIS — R26.2 UNABLE TO WALK: ICD-10-CM

## 2024-12-11 DIAGNOSIS — I48.20 CHRONIC ATRIAL FIBRILLATION (HCC): ICD-10-CM

## 2024-12-11 DIAGNOSIS — R29.6 FALLS: ICD-10-CM

## 2024-12-11 DIAGNOSIS — R79.89 ELEVATED TROPONIN: ICD-10-CM

## 2024-12-11 DIAGNOSIS — R53.1 GENERALIZED WEAKNESS: Primary | ICD-10-CM

## 2024-12-11 PROBLEM — F17.200 SMOKING: Status: ACTIVE | Noted: 2024-12-11

## 2024-12-11 PROBLEM — IMO0001 SMOKING: Status: ACTIVE | Noted: 2024-12-11

## 2024-12-11 LAB
2HR DELTA HS TROPONIN: 17 NG/L
4HR DELTA HS TROPONIN: 24 NG/L
ALBUMIN SERPL BCG-MCNC: 3.2 G/DL (ref 3.5–5)
ALP SERPL-CCNC: 105 U/L (ref 34–104)
ALT SERPL W P-5'-P-CCNC: 20 U/L (ref 7–52)
ANION GAP SERPL CALCULATED.3IONS-SCNC: 9 MMOL/L (ref 4–13)
AST SERPL W P-5'-P-CCNC: 33 U/L (ref 13–39)
BASOPHILS # BLD AUTO: 0.08 THOUSANDS/ÂΜL (ref 0–0.1)
BASOPHILS NFR BLD AUTO: 1 % (ref 0–1)
BILIRUB SERPL-MCNC: 0.81 MG/DL (ref 0.2–1)
BNP SERPL-MCNC: 747 PG/ML (ref 0–100)
BUN SERPL-MCNC: 16 MG/DL (ref 5–25)
CALCIUM ALBUM COR SERPL-MCNC: 9.5 MG/DL (ref 8.3–10.1)
CALCIUM SERPL-MCNC: 8.9 MG/DL (ref 8.4–10.2)
CARDIAC TROPONIN I PNL SERPL HS: 512 NG/L (ref ?–50)
CARDIAC TROPONIN I PNL SERPL HS: 529 NG/L (ref ?–50)
CARDIAC TROPONIN I PNL SERPL HS: 536 NG/L (ref ?–50)
CHLORIDE SERPL-SCNC: 100 MMOL/L (ref 96–108)
CK SERPL-CCNC: 219 U/L (ref 39–308)
CO2 SERPL-SCNC: 26 MMOL/L (ref 21–32)
CREAT SERPL-MCNC: 0.95 MG/DL (ref 0.6–1.3)
EOSINOPHIL # BLD AUTO: 0.13 THOUSAND/ÂΜL (ref 0–0.61)
EOSINOPHIL NFR BLD AUTO: 2 % (ref 0–6)
ERYTHROCYTE [DISTWIDTH] IN BLOOD BY AUTOMATED COUNT: 15.3 % (ref 11.6–15.1)
ETHANOL SERPL-MCNC: <10 MG/DL
GFR SERPL CREATININE-BSD FRML MDRD: 85 ML/MIN/1.73SQ M
GLUCOSE SERPL-MCNC: 105 MG/DL (ref 65–140)
GLUCOSE SERPL-MCNC: 97 MG/DL (ref 65–140)
HCT VFR BLD AUTO: 32.1 % (ref 36.5–49.3)
HGB BLD-MCNC: 10.4 G/DL (ref 12–17)
IMM GRANULOCYTES # BLD AUTO: 0.03 THOUSAND/UL (ref 0–0.2)
IMM GRANULOCYTES NFR BLD AUTO: 0 % (ref 0–2)
LYMPHOCYTES # BLD AUTO: 1.11 THOUSANDS/ÂΜL (ref 0.6–4.47)
LYMPHOCYTES NFR BLD AUTO: 13 % (ref 14–44)
MCH RBC QN AUTO: 32 PG (ref 26.8–34.3)
MCHC RBC AUTO-ENTMCNC: 32.4 G/DL (ref 31.4–37.4)
MCV RBC AUTO: 99 FL (ref 82–98)
MONOCYTES # BLD AUTO: 0.83 THOUSAND/ÂΜL (ref 0.17–1.22)
MONOCYTES NFR BLD AUTO: 10 % (ref 4–12)
NEUTROPHILS # BLD AUTO: 6.44 THOUSANDS/ÂΜL (ref 1.85–7.62)
NEUTS SEG NFR BLD AUTO: 74 % (ref 43–75)
NRBC BLD AUTO-RTO: 0 /100 WBCS
PLATELET # BLD AUTO: 123 THOUSANDS/UL (ref 149–390)
PMV BLD AUTO: 9.8 FL (ref 8.9–12.7)
POTASSIUM SERPL-SCNC: 4.2 MMOL/L (ref 3.5–5.3)
PROT SERPL-MCNC: 6.9 G/DL (ref 6.4–8.4)
QRS AXIS: 80 DEGREES
QRSD INTERVAL: 94 MS
QT INTERVAL: 378 MS
QTC INTERVAL: 462 MS
RBC # BLD AUTO: 3.25 MILLION/UL (ref 3.88–5.62)
SODIUM SERPL-SCNC: 135 MMOL/L (ref 135–147)
T WAVE AXIS: -72 DEGREES
VENTRICULAR RATE: 90 BPM
WBC # BLD AUTO: 8.62 THOUSAND/UL (ref 4.31–10.16)

## 2024-12-11 PROCEDURE — 83880 ASSAY OF NATRIURETIC PEPTIDE: CPT | Performed by: EMERGENCY MEDICINE

## 2024-12-11 PROCEDURE — 70450 CT HEAD/BRAIN W/O DYE: CPT

## 2024-12-11 PROCEDURE — 82948 REAGENT STRIP/BLOOD GLUCOSE: CPT

## 2024-12-11 PROCEDURE — 72131 CT LUMBAR SPINE W/O DYE: CPT

## 2024-12-11 PROCEDURE — 85025 COMPLETE CBC W/AUTO DIFF WBC: CPT | Performed by: EMERGENCY MEDICINE

## 2024-12-11 PROCEDURE — 82077 ASSAY SPEC XCP UR&BREATH IA: CPT | Performed by: EMERGENCY MEDICINE

## 2024-12-11 PROCEDURE — 71045 X-RAY EXAM CHEST 1 VIEW: CPT

## 2024-12-11 PROCEDURE — 93005 ELECTROCARDIOGRAM TRACING: CPT

## 2024-12-11 PROCEDURE — 36415 COLL VENOUS BLD VENIPUNCTURE: CPT | Performed by: EMERGENCY MEDICINE

## 2024-12-11 PROCEDURE — 99285 EMERGENCY DEPT VISIT HI MDM: CPT | Performed by: EMERGENCY MEDICINE

## 2024-12-11 PROCEDURE — 93010 ELECTROCARDIOGRAM REPORT: CPT | Performed by: INTERNAL MEDICINE

## 2024-12-11 PROCEDURE — 84484 ASSAY OF TROPONIN QUANT: CPT | Performed by: EMERGENCY MEDICINE

## 2024-12-11 PROCEDURE — 80053 COMPREHEN METABOLIC PANEL: CPT | Performed by: EMERGENCY MEDICINE

## 2024-12-11 PROCEDURE — 82550 ASSAY OF CK (CPK): CPT | Performed by: FAMILY MEDICINE

## 2024-12-11 PROCEDURE — 99285 EMERGENCY DEPT VISIT HI MDM: CPT

## 2024-12-11 PROCEDURE — 84484 ASSAY OF TROPONIN QUANT: CPT

## 2024-12-11 RX ORDER — KETOROLAC TROMETHAMINE 30 MG/ML
15 INJECTION, SOLUTION INTRAMUSCULAR; INTRAVENOUS ONCE
Status: COMPLETED | OUTPATIENT
Start: 2024-12-11 | End: 2024-12-11

## 2024-12-11 RX ORDER — AMLODIPINE BESYLATE 5 MG/1
5 TABLET ORAL DAILY
Status: DISCONTINUED | OUTPATIENT
Start: 2024-12-12 | End: 2024-12-11

## 2024-12-11 RX ORDER — FERROUS SULFATE 325(65) MG
325 TABLET ORAL
Status: DISCONTINUED | OUTPATIENT
Start: 2024-12-12 | End: 2024-12-12 | Stop reason: HOSPADM

## 2024-12-11 RX ORDER — TAMSULOSIN HYDROCHLORIDE 0.4 MG/1
0.4 CAPSULE ORAL DAILY
Status: DISCONTINUED | OUTPATIENT
Start: 2024-12-12 | End: 2024-12-12 | Stop reason: HOSPADM

## 2024-12-11 RX ORDER — GUAIFENESIN 600 MG/1
600 TABLET, EXTENDED RELEASE ORAL EVERY 12 HOURS SCHEDULED
Status: DISCONTINUED | OUTPATIENT
Start: 2024-12-11 | End: 2024-12-12 | Stop reason: HOSPADM

## 2024-12-11 RX ORDER — GABAPENTIN 300 MG/1
300 CAPSULE ORAL 3 TIMES DAILY
Status: DISCONTINUED | OUTPATIENT
Start: 2024-12-11 | End: 2024-12-12 | Stop reason: HOSPADM

## 2024-12-11 RX ORDER — ACETAMINOPHEN 325 MG/1
650 TABLET ORAL EVERY 6 HOURS PRN
Status: DISCONTINUED | OUTPATIENT
Start: 2024-12-11 | End: 2024-12-12 | Stop reason: HOSPADM

## 2024-12-11 RX ORDER — KETOROLAC TROMETHAMINE 30 MG/ML
15 INJECTION, SOLUTION INTRAMUSCULAR; INTRAVENOUS EVERY 6 HOURS PRN
Status: DISCONTINUED | OUTPATIENT
Start: 2024-12-11 | End: 2024-12-12

## 2024-12-11 RX ORDER — DILTIAZEM HCL 60 MG
60 TABLET ORAL EVERY 6 HOURS SCHEDULED
Status: DISCONTINUED | OUTPATIENT
Start: 2024-12-11 | End: 2024-12-12

## 2024-12-11 RX ORDER — FOLIC ACID 1 MG/1
1000 TABLET ORAL DAILY
Status: DISCONTINUED | OUTPATIENT
Start: 2024-12-12 | End: 2024-12-12 | Stop reason: HOSPADM

## 2024-12-11 RX ORDER — FUROSEMIDE 10 MG/ML
20 INJECTION INTRAMUSCULAR; INTRAVENOUS ONCE
Status: COMPLETED | OUTPATIENT
Start: 2024-12-11 | End: 2024-12-11

## 2024-12-11 RX ORDER — PANTOPRAZOLE SODIUM 40 MG/1
40 TABLET, DELAYED RELEASE ORAL
Status: DISCONTINUED | OUTPATIENT
Start: 2024-12-11 | End: 2024-12-12 | Stop reason: HOSPADM

## 2024-12-11 RX ORDER — ASPIRIN 81 MG/1
81 TABLET, CHEWABLE ORAL DAILY
Status: DISCONTINUED | OUTPATIENT
Start: 2024-12-12 | End: 2024-12-12 | Stop reason: HOSPADM

## 2024-12-11 RX ORDER — INSULIN LISPRO 100 [IU]/ML
1-6 INJECTION, SOLUTION INTRAVENOUS; SUBCUTANEOUS
Status: DISCONTINUED | OUTPATIENT
Start: 2024-12-12 | End: 2024-12-12 | Stop reason: HOSPADM

## 2024-12-11 RX ORDER — LIDOCAINE 50 MG/G
1 PATCH TOPICAL DAILY
Status: DISCONTINUED | OUTPATIENT
Start: 2024-12-12 | End: 2024-12-12 | Stop reason: HOSPADM

## 2024-12-11 RX ORDER — LANOLIN ALCOHOL/MO/W.PET/CERES
800 CREAM (GRAM) TOPICAL 2 TIMES DAILY
Status: DISCONTINUED | OUTPATIENT
Start: 2024-12-11 | End: 2024-12-12 | Stop reason: HOSPADM

## 2024-12-11 RX ORDER — LANOLIN ALCOHOL/MO/W.PET/CERES
100 CREAM (GRAM) TOPICAL DAILY
Status: DISCONTINUED | OUTPATIENT
Start: 2024-12-12 | End: 2024-12-12 | Stop reason: HOSPADM

## 2024-12-11 RX ORDER — OXYCODONE HYDROCHLORIDE 5 MG/1
5 TABLET ORAL EVERY 4 HOURS PRN
Refills: 0 | Status: DISCONTINUED | OUTPATIENT
Start: 2024-12-11 | End: 2024-12-12

## 2024-12-11 RX ORDER — SODIUM CHLORIDE 1 G/1
2 TABLET ORAL 2 TIMES DAILY WITH MEALS
Status: DISCONTINUED | OUTPATIENT
Start: 2024-12-11 | End: 2024-12-12 | Stop reason: HOSPADM

## 2024-12-11 RX ORDER — FLUTICASONE FUROATE AND VILANTEROL 200; 25 UG/1; UG/1
1 POWDER RESPIRATORY (INHALATION) DAILY
Status: DISCONTINUED | OUTPATIENT
Start: 2024-12-12 | End: 2024-12-12 | Stop reason: HOSPADM

## 2024-12-11 RX ORDER — RANOLAZINE 500 MG/1
500 TABLET, EXTENDED RELEASE ORAL EVERY 12 HOURS SCHEDULED
Status: DISCONTINUED | OUTPATIENT
Start: 2024-12-11 | End: 2024-12-12 | Stop reason: HOSPADM

## 2024-12-11 RX ORDER — METOPROLOL TARTRATE 100 MG/1
200 TABLET ORAL EVERY 12 HOURS SCHEDULED
Status: DISCONTINUED | OUTPATIENT
Start: 2024-12-11 | End: 2024-12-12 | Stop reason: HOSPADM

## 2024-12-11 RX ORDER — NICOTINE 21 MG/24HR
1 PATCH, TRANSDERMAL 24 HOURS TRANSDERMAL DAILY
Status: DISCONTINUED | OUTPATIENT
Start: 2024-12-12 | End: 2024-12-12 | Stop reason: HOSPADM

## 2024-12-11 RX ADMIN — METOPROLOL TARTRATE 200 MG: 100 TABLET, FILM COATED ORAL at 20:35

## 2024-12-11 RX ADMIN — KETOROLAC TROMETHAMINE 15 MG: 30 INJECTION, SOLUTION INTRAMUSCULAR; INTRAVENOUS at 17:57

## 2024-12-11 RX ADMIN — DILTIAZEM HYDROCHLORIDE 60 MG: 60 TABLET ORAL at 19:09

## 2024-12-11 RX ADMIN — OXYCODONE HYDROCHLORIDE 5 MG: 5 TABLET ORAL at 20:39

## 2024-12-11 RX ADMIN — GUAIFENESIN 600 MG: 600 TABLET, EXTENDED RELEASE ORAL at 23:29

## 2024-12-11 RX ADMIN — FUROSEMIDE 20 MG: 10 INJECTION, SOLUTION INTRAMUSCULAR; INTRAVENOUS at 20:33

## 2024-12-11 RX ADMIN — RANOLAZINE 500 MG: 500 TABLET, FILM COATED, EXTENDED RELEASE ORAL at 20:35

## 2024-12-11 RX ADMIN — Medication 800 MG: at 19:09

## 2024-12-11 RX ADMIN — APIXABAN 5 MG: 5 TABLET, FILM COATED ORAL at 19:09

## 2024-12-11 RX ADMIN — GABAPENTIN 300 MG: 300 CAPSULE ORAL at 20:35

## 2024-12-11 RX ADMIN — PANTOPRAZOLE SODIUM 40 MG: 40 TABLET, DELAYED RELEASE ORAL at 19:09

## 2024-12-11 NOTE — ASSESSMENT & PLAN NOTE
Lab Results   Component Value Date    HGBA1C 5.2 11/14/2024       Recent Labs     12/08/24  2107 12/09/24  0719 12/09/24  1059   POCGLU 157* 140 126       Blood Sugar Average: Last 72 hrs:

## 2024-12-11 NOTE — ASSESSMENT & PLAN NOTE
Lab Results   Component Value Date    EGFR 85 12/11/2024    EGFR 76 12/09/2024    EGFR 92 12/08/2024    CREATININE 0.95 12/11/2024    CREATININE 1.04 12/09/2024    CREATININE 0.88 12/08/2024      WDL WDL

## 2024-12-11 NOTE — TELEPHONE ENCOUNTER
Patient called and stated that he is afraid of leaving his building as well as getting in the shower because he is afraid that he is going to fall. He is asking if he can have someone come to his house for a visit. I advised that I could message the provider to  see if he would qualify for that kind of visit.     Tuesday and Dr. Bowles- can you review?

## 2024-12-11 NOTE — ASSESSMENT & PLAN NOTE
Wt Readings from Last 3 Encounters:   12/09/24 84 kg (185 lb 3 oz)   11/28/24 83 kg (182 lb 15.7 oz)   11/25/24 81.2 kg (179 lb)

## 2024-12-11 NOTE — ED PROVIDER NOTES
Time reflects when diagnosis was documented in both MDM as applicable and the Disposition within this note       Time User Action Codes Description Comment    12/11/2024  4:40 PM Marilia Martinez Add [R53.1] Generalized weakness     12/11/2024  4:40 PM Marilia Martinez Add [R26.2] Unable to walk     12/11/2024  4:40 PM Marilia Martinez Add [R29.6] Falls     12/11/2024  4:40 PM Marilia Martinez Add [R79.89] Elevated troponin     12/11/2024  4:40 PM Marilia Martinez Add [F10.10] ETOH abuse           ED Disposition       ED Disposition   Admit    Condition   Stable    Date/Time   Wed Dec 11, 2024  4:40 PM    Comment   Case was discussed with DORINDA teaching             Assessment & Plan       Medical Decision Making  Pt. With history of alcohol abuse and afib c/o he is too weak to walk and he has fallen at home.  EKG with T wave inversion and Trop 512 which is elevated from prior level of 116 done 4 days ago.  Neuro exam non-focal.   Will admit for further evaluation, PT/OT and possible rehab transfer.    Amount and/or Complexity of Data Reviewed  Labs: ordered.  Radiology: ordered.    Risk  Decision regarding hospitalization.             Medications - No data to display    ED Risk Strat Scores   HEART Risk Score      Flowsheet Row Most Recent Value   Heart Score Risk Calculator    History 0 Filed at: 12/11/2024 1637   ECG 2 Filed at: 12/11/2024 1637   Age 1 Filed at: 12/11/2024 1637   Risk Factors 1 Filed at: 12/11/2024 1637   Troponin 2 Filed at: 12/11/2024 1637   HEART Score 6 Filed at: 12/11/2024 1637          HEART Risk Score      Flowsheet Row Most Recent Value   Heart Score Risk Calculator    History 0 Filed at: 12/11/2024 1637   ECG 2 Filed at: 12/11/2024 1637   Age 1 Filed at: 12/11/2024 1637   Risk Factors 1 Filed at: 12/11/2024 1637   Troponin 2 Filed at: 12/11/2024 1637   HEART Score 6 Filed at: 12/11/2024 1637                                                History of Present Illness       Chief Complaint    Patient presents with    Weakness - Generalized     Patient states increased weakness since discharge, unable to walk.        Past Medical History:   Diagnosis Date    Acute on chronic kidney failure  (HCC)     Alcohol withdrawal (HCC) 06/07/2019    Atrial fibrillation (HCC)     Cancer (HCC)     prostate ca,had radiation    Cardiac disease     stents,then triple bypass    COPD (chronic obstructive pulmonary disease) (HCC)     Coronary artery disease     ETOH abuse     Heart failure (HCC)     History of heart surgery     says triple bypass [facility]    Hx of heart artery stent     2014    Hyperlipidemia     Hypertension     Hyponatremia 10/17/2019    Hypovolemic shock ([facility]) 12/22/2019    Lumbar spondylitis ([facility]) 10/13/2022    Metabolic acidosis, increased anion gap 04/21/2021    Nasal bone fracture 10/10/2022    Prostate CA ([facility])     S/P CABG x 3     2004    Sleep apnea       Past Surgical History:   Procedure Laterality Date    CARDIAC CATHETERIZATION      2 stents    CORONARY ARTERY BYPASS GRAFT  2004    MS ARTHRD ANT INTERBODY MIN DSC CRV BELOW C2 N/A 12/16/2020    Procedure: Anterior cervical discectomy with fusion C4-C7; Posterior cervical decompression and fusion C2-T2;  Surgeon: David Rowell MD;  Location: [facility];  Service: Neurosurgery    TONSILLECTOMY        Family History   Problem Relation Age of Onset    Diabetes Mother     Uterine cancer Mother     COPD Father     Hypertension Father       Social History     Tobacco Use    Smoking status: Every Day     Current packs/day: 1.50     Average packs/day: 1.5 packs/day for 40.0 years (60.0 ttl pk-yrs)     Types: Cigarettes    Smokeless tobacco: Never   Vaping Use    Vaping status: Never Used   Substance Use Topics    Alcohol use: Yes     Alcohol/week: 4.0 standard drinks of alcohol     Types: 4 Standard drinks or equivalent per week     Comment: quart of vodka daily    Drug use: No      E-Cigarette/Vaping    E-Cigarette Use Never User        E-Cigarette/Vaping Substances    Nicotine Yes     THC No     CBD No     Flavoring No     Other No     Unknown No       I have reviewed and agree with the history as documented.     65 yo male known to ER/hospital, just left AMA from inpatient here 2 days ago.  He reportedly fell at home then, seen at [place] [facility] for head injury - had negative CT brain and C-spine and CXR with NAD, rib fxs 6/7.  Pt. Says he can't walk at home and needs to go to rehab.  He c/o low back pain from the fall.  He feels weak all over.  No chest pain or sob.  No bowel or bladder problems.  No vomiting.      History provided by:  Patient   used: No        Review of Systems   Constitutional:  Negative for fever.   Respiratory:  Negative for cough and shortness of breath.    Cardiovascular:  Negative for chest pain.   Gastrointestinal:  Negative for abdominal pain, constipation, diarrhea, nausea and vomiting.   Musculoskeletal:  Positive for back pain. Negative for neck pain.   Skin:  Positive for wound.   All other systems reviewed and are negative.          Objective       ED Triage Vitals [12/11/24 1425]   Temperature Pulse Blood Pressure Respirations SpO2 Patient Position - Orthostatic VS   98.5 °F (36.9 °C) 105 126/76 18 95 % Lying      Temp Source Heart Rate Source BP Location FiO2 (%) Pain Score    Tympanic Monitor Left arm -- --      Vitals      Date and Time Temp Pulse SpO2 Resp BP Pain Score FACES Pain Rating User   12/11/24 1425 98.5 °F (36.9 °C) 105 95 % 18 126/76 -- -- AG            Physical Exam  Vitals and nursing note reviewed.   Constitutional:       General: He is not in acute distress.     Appearance: He is well-developed. He is not ill-appearing or diaphoretic.   HENT:      Head: Normocephalic.      Comments: Abrasion/scab top back of scalp  Eyes:      General: No scleral icterus.     Extraocular Movements: Extraocular movements intact.      Conjunctiva/sclera: Conjunctivae normal.      Pupils: Pupils  are equal, round, and reactive to light.   Cardiovascular:      Rate and Rhythm: Normal rate. Rhythm irregular.      Heart sounds: Normal heart sounds. No murmur heard.  Pulmonary:      Effort: Pulmonary effort is normal. No respiratory distress.      Breath sounds: Normal breath sounds.   Abdominal:      General: Bowel sounds are normal. There is no distension.      Palpations: Abdomen is soft.      Tenderness: There is no abdominal tenderness.   Musculoskeletal:         General: Tenderness present. No deformity. Normal range of motion.      Cervical back: Normal range of motion and neck supple.      Right lower leg: No edema.      Left lower leg: No edema.      Comments: + ttp mid lumbar midline spine   Skin:     General: Skin is warm and dry.      Coloration: Skin is not pale.      Findings: No erythema or rash.   Neurological:      General: No focal deficit present.      Mental Status: He is alert and oriented to person, place, and time.      Cranial Nerves: No cranial nerve deficit.   Psychiatric:         Mood and Affect: Mood normal.         Behavior: Behavior normal.         Results Reviewed       Procedure Component Value Units Date/Time    CK [[id number]]     Lab Status: No result Specimen: Blood     Ethanol [[id number]] Collected: 12/11/24 1623    Lab Status: In process Specimen: Blood from Arm, Left Updated: 12/11/24 1626    HS Troponin I 4hr [[id number]]     Lab Status: No result Specimen: Blood     HS Troponin I 2hr [[id number]]     Lab Status: No result Specimen: Blood     HS Troponin 0hr (reflex protocol) [[id number]]  (Abnormal) Collected: 12/11/24 1504    Lab Status: Final result Specimen: Blood from Arm, Right Updated: 12/11/24 1537     hs TnI 0hr 512 ng/L     Comprehensive metabolic panel [[id number]]  (Abnormal) Collected: 12/11/24 1504    Lab Status: Final result Specimen: Blood from Arm, Right Updated: 12/11/24 1532     Sodium 135 mmol/L      Potassium 4.2 mmol/L      Chloride 100 mmol/L      CO2 26  mmol/L      ANION GAP 9 mmol/L      BUN 16 mg/dL      Creatinine 0.95 mg/dL      Glucose 97 mg/dL      Calcium 8.9 mg/dL      Corrected Calcium 9.5 mg/dL      AST 33 U/L      ALT 20 U/L      Alkaline Phosphatase 105 U/L      Total Protein 6.9 g/dL      Albumin 3.2 g/dL      Total Bilirubin 0.81 mg/dL      eGFR 85 ml/min/1.73sq m     Narrative:      National Kid[employer] guidelines for Chronic Kidney Disease (CKD):     Stage 1 with normal or high GFR (GFR > 90 mL/min/1.73 square meters)    Stage 2 Mild CKD (GFR = 60-89 mL/min/1.73 square meters)    Stage 3A Moderate CKD (GFR = 45-59 mL/min/1.73 square meters)    Stage 3B Moderate CKD (GFR = 30-44 mL/min/1.73 square meters)    Stage 4 Severe CKD (GFR = 15-29 mL/min/1.73 square meters)    Stage 5 End Stage CKD (GFR <15 mL/min/1.73 square meters)  Note: GFR calculation is accurate only with a steady state creatinine    CBC and differential [[id number]]  (Abnormal) Collected: 12/11/24 1504    Lab Status: Final result Specimen: Blood from Arm, Right Updated: 12/11/24 1511     WBC 8.62 Thousand/uL      RBC 3.25 Million/uL      Hemoglobin 10.4 g/dL      Hematocrit 32.1 %      MCV 99 fL      MCH 32.0 pg      MCHC 32.4 g/dL      RDW 15.3 %      MPV 9.8 fL      Platelets 123 Thousands/uL      nRBC 0 /100 WBCs      Segmented % 74 %      Immature Grans % 0 %      Lymphocytes % 13 %      Monocytes % 10 %      Eosinophils Relative 2 %      Basophils Relative 1 %      Absolute Neutrophils 6.44 Thousands/µL      Absolute Immature Grans 0.03 Thousand/uL      Absolute Lymphocytes 1.11 Thousands/µL      Absolute Monocytes 0.83 Thousand/µL      Eosinophils Absolute 0.13 Thousand/µL      Basophils Absolute 0.08 Thousands/µL     B-Type Natriuretic Peptide(BNP) [[id number]] Collected: 12/11/24 1504    Lab Status: In process Specimen: Blood from Arm, Right Updated: 12/11/24 1509            CT head without contrast   Final Interpretation by Rikki Hines MD (12/11 1618)       No acute intracranial abnormality.                  Workstation performed: [id number]         CT lumbar spine without contrast   Final Interpretation by Rikki Hines MD (12/11 9463)      1.  No acute osseous abnormality.   2.  Degenerative changes as described.   3.  See comments above for full analysis.      Workstation performed: [id number]         XR chest 1 view portable   Final Interpretation by Daryn Cheung MD (12/11 0071)      Mild pulmonary vascular congestion. New small left pleural effusion.            Workstation performed: [id number]             ECG 12 Lead Documentation Only    Date/Time: 12/11/2024 3:00 PM    Performed by: Marilia Martinez MD  Authorized by: Marilia Martinez MD    Indications / Diagnosis:  Weakness  ECG reviewed by me, the ED Provider: yes    Patient location:  ED  Previous ECG:     Previous ECG:  Compared to current    Similarity:  No change  Interpretation:     Interpretation: abnormal    Rate:     ECG rate:  90    ECG rate assessment: normal    Rhythm:     Rhythm: atrial fibrillation    Ectopy:     Ectopy: none    QRS:     QRS axis:  Normal  Conduction:     Conduction: normal    ST segments:     ST segments:  Normal  T waves:     T waves: inverted      Inverted:  II, III, aVF, V3, V4, V5 and V6      ED Medication and Procedure Management   Prior to Admission Medications   Prescriptions Last Dose Informant Patient Reported? Taking?   Blood Glucose Monitoring Suppl (ONE TOUCH ULTRA MINI) w/Device KIT  Self Yes No   Sig: Use as directed   Patient not taking: Reported on 3/25/2024   Blood Pressure Monitoring (Adult Blood Pressure Cuff Lg) KIT   No No   Sig: Use in the morning   Patient not taking: Reported on 3/25/2024   Breo Ellipta 200-25 MCG/ACT inhaler  Self No No   Sig: Inhale 1 puff daily Rinse mouth after use.   ONETOUCH DELICA LANCETS FINE MISC  Self Yes No   Sig: 3 (three) times a day Test   Thiamine HCl (vitamin B-1) 100 MG TABS  Self No No   Sig: TAKE 1 TABLET DAILY    albuterol (Ventolin HFA) 90 mcg/act inhaler  Self No No   Sig: Inhale 2 puffs every 4 (four) hours as needed for wheezing   aluminum-magnesium hydroxide-simethicone (MAALOX) 6535-4888-058 mg/30 mL suspension   No No   Sig: Take 30 mL by mouth every 4 (four) hours as needed for indigestion or heartburn   amLODIPine (NORVASC) 5 mg tablet   No No   Sig: Take 1 tablet (5 mg total) by mouth daily   apixaban (Eliquis) 5 mg   No No   Sig: TAKE 1 TABLET BY MOUTH 2 TIMES A DAY DO NOT START BEFORE JANUARY 31, 2024.   aspirin (ECOTRIN LOW STRENGTH) 81 mg EC tablet  Self Yes No   Sig: Take 81 mg by mouth daily   diltiazem (CARDIZEM) 60 mg tablet   No No   Sig: Take 1 tablet (60 mg total) by mouth every 6 (six) hours   ferrous sulfate 325 (65 Fe) mg tablet  Self No No   Sig: Take 1 tablet (325 mg total) by mouth daily with breakfast   folic acid (FOLVITE) 1 mg tablet   No No   Sig: TAKE 1 TABLET DAILY   gabapentin (NEURONTIN) 300 mg capsule   No No   Sig: TAKE ONE CAPSULE THREE TIMES DAILY   lidocaine (Lidoderm) 5 %   No No   Sig: Apply 1 patch topically over 12 hours daily Remove & Discard patch within 12 hours or as directed by MD   magnesium Oxide (MAG-OX) 400 mg TABS   No No   Sig: Take 2 tablets (800 mg total) by mouth 2 (two) times a day   metFORMIN (GLUCOPHAGE) 500 mg tablet   No No   Sig: TAKE 1 TABLET DAILY WITH BREAKFAST   metoprolol tartrate (LOPRESSOR) 100 mg tablet   No No   Sig: Take 2 tablets (200 mg total) by mouth every 12 (twelve) hours   naltrexone (REVIA) 50 mg tablet   No No   Sig: Take 1 tablet (50 mg total) by mouth daily   naproxen (Naprosyn) 500 mg tablet   No No   Sig: Take 1 tablet (500 mg total) by mouth 2 (two) times a day with meals   nicotine (NICODERM CQ) 21 mg/24 hr TD 24 hr patch  Self No No   Sig: Place 1 patch on the skin over 24 hours daily Do not start before December 4, 2023.   pantoprazole (PROTONIX) 40 mg tablet   No No   Sig: TAKE 1 TABLET TWO TIMES A DAY   ranolazine (RANEXA) 500 mg  12 hr tablet   No No   Sig: TAKE 1 TABLET EVERY 12 HOURS   senna-docusate sodium (SENOKOT S) 8.6-50 mg per tablet   No No   Sig: Take 1 tablet by mouth daily as needed for constipation   sodium chloride 1 g tablet   No No   Sig: Take 2 tablets (2 g total) by mouth 2 (two) times a day with meals   tamsulosin (FLOMAX) 0.4 mg   No No   Sig: TAKE 1 CAPSULE DAILY      Facility-Administered Medications: None     Patient's Medications   Discharge Prescriptions    No medications on file     No discharge procedures on file.  ED SEPSIS DOCUMENTATION   Time reflects when diagnosis was documented in both MDM as applicable and the Disposition within this note       Time User Action Codes Description Comment    12/11/2024  4:40 PM Marilia Maritnez [R53.1] Generalized weakness     12/11/2024  4:40 PM Marilia Martinez Add [R26.2] Unable to walk     12/11/2024  4:40 PM Marilia Martinez Add [R29.6] Falls     12/11/2024  4:40 PM Marilia Martinez [R79.89] Elevated troponin     12/11/2024  4:40 PM Marilia Martinez [F10.10] ETOH abuse                  Marilia Martinez MD  12/11/24 [id number]     Patient/surrogate refused vaccine...

## 2024-12-11 NOTE — TELEPHONE ENCOUNTER
Spoke with patient - advised that the provider is recommending patient to go back to the ED.    (0) independent (3) assistive equipment and person

## 2024-12-12 VITALS
HEART RATE: 68 BPM | SYSTOLIC BLOOD PRESSURE: 117 MMHG | DIASTOLIC BLOOD PRESSURE: 67 MMHG | WEIGHT: 185 LBS | OXYGEN SATURATION: 94 % | HEIGHT: 74 IN | TEMPERATURE: 98 F | RESPIRATION RATE: 16 BRPM | BODY MASS INDEX: 23.74 KG/M2
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
## 2024-12-12 PROBLEM — R79.89 ELEVATED TROPONIN: Status: RESOLVED | Noted: 2023-09-28 | Resolved: 2024-12-12

## 2024-12-12 PROBLEM — I48.11 LONGSTANDING PERSISTENT ATRIAL FIBRILLATION (HCC): Status: ACTIVE | Noted: 2023-09-21

## 2024-12-12 LAB
ALBUMIN SERPL BCG-MCNC: 3.2 G/DL (ref 3.5–5)
ALP SERPL-CCNC: 89 U/L (ref 34–104)
ALT SERPL W P-5'-P-CCNC: 14 U/L (ref 7–52)
ANION GAP SERPL CALCULATED.3IONS-SCNC: 11 MMOL/L (ref 4–13)
AST SERPL W P-5'-P-CCNC: 21 U/L (ref 13–39)
BASOPHILS # BLD AUTO: 0.08 THOUSANDS/ÂΜL (ref 0–0.1)
BASOPHILS NFR BLD AUTO: 1 % (ref 0–1)
BILIRUB SERPL-MCNC: 0.65 MG/DL (ref 0.2–1)
BUN SERPL-MCNC: 22 MG/DL (ref 5–25)
CALCIUM ALBUM COR SERPL-MCNC: 8.7 MG/DL (ref 8.3–10.1)
CALCIUM SERPL-MCNC: 8.1 MG/DL (ref 8.4–10.2)
CARDIAC TROPONIN I PNL SERPL HS: 443 NG/L (ref 8–18)
CHLORIDE SERPL-SCNC: 99 MMOL/L (ref 96–108)
CO2 SERPL-SCNC: 24 MMOL/L (ref 21–32)
CREAT SERPL-MCNC: 1.28 MG/DL (ref 0.6–1.3)
EOSINOPHIL # BLD AUTO: 0.39 THOUSAND/ÂΜL (ref 0–0.61)
EOSINOPHIL NFR BLD AUTO: 6 % (ref 0–6)
ERYTHROCYTE [DISTWIDTH] IN BLOOD BY AUTOMATED COUNT: 15.2 % (ref 11.6–15.1)
GFR SERPL CREATININE-BSD FRML MDRD: 59 ML/MIN/1.73SQ M
GLUCOSE P FAST SERPL-MCNC: 85 MG/DL (ref 65–99)
GLUCOSE SERPL-MCNC: 112 MG/DL (ref 65–140)
GLUCOSE SERPL-MCNC: 139 MG/DL (ref 65–140)
GLUCOSE SERPL-MCNC: 85 MG/DL (ref 65–140)
HCT VFR BLD AUTO: 31.3 % (ref 36.5–49.3)
HGB BLD-MCNC: 10.2 G/DL (ref 12–17)
IMM GRANULOCYTES # BLD AUTO: 0.03 THOUSAND/UL (ref 0–0.2)
IMM GRANULOCYTES NFR BLD AUTO: 1 % (ref 0–2)
LYMPHOCYTES # BLD AUTO: 1.62 THOUSANDS/ÂΜL (ref 0.6–4.47)
LYMPHOCYTES NFR BLD AUTO: 26 % (ref 14–44)
MAGNESIUM SERPL-MCNC: 1.8 MG/DL (ref 1.9–2.7)
MCH RBC QN AUTO: 32.3 PG (ref 26.8–34.3)
MCHC RBC AUTO-ENTMCNC: 32.6 G/DL (ref 31.4–37.4)
MCV RBC AUTO: 99 FL (ref 82–98)
MONOCYTES # BLD AUTO: 0.88 THOUSAND/ÂΜL (ref 0.17–1.22)
MONOCYTES NFR BLD AUTO: 14 % (ref 4–12)
NEUTROPHILS # BLD AUTO: 3.25 THOUSANDS/ÂΜL (ref 1.85–7.62)
NEUTS SEG NFR BLD AUTO: 52 % (ref 43–75)
NRBC BLD AUTO-RTO: 0 /100 WBCS
PHOSPHATE SERPL-MCNC: 4 MG/DL (ref 2.3–4.1)
PLATELET # BLD AUTO: 122 THOUSANDS/UL (ref 149–390)
PMV BLD AUTO: 10.6 FL (ref 8.9–12.7)
POTASSIUM SERPL-SCNC: 3.9 MMOL/L (ref 3.5–5.3)
PROT SERPL-MCNC: 6.4 G/DL (ref 6.4–8.4)
RBC # BLD AUTO: 3.16 MILLION/UL (ref 3.88–5.62)
SODIUM SERPL-SCNC: 134 MMOL/L (ref 135–147)
WBC # BLD AUTO: 6.25 THOUSAND/UL (ref 4.31–10.16)

## 2024-12-12 PROCEDURE — 99214 OFFICE O/P EST MOD 30 MIN: CPT | Performed by: INTERNAL MEDICINE

## 2024-12-12 PROCEDURE — 84484 ASSAY OF TROPONIN QUANT: CPT

## 2024-12-12 PROCEDURE — 99223 1ST HOSP IP/OBS HIGH 75: CPT | Performed by: FAMILY MEDICINE

## 2024-12-12 PROCEDURE — 83735 ASSAY OF MAGNESIUM: CPT

## 2024-12-12 PROCEDURE — 97162 PT EVAL MOD COMPLEX 30 MIN: CPT

## 2024-12-12 PROCEDURE — 80053 COMPREHEN METABOLIC PANEL: CPT

## 2024-12-12 PROCEDURE — 84100 ASSAY OF PHOSPHORUS: CPT

## 2024-12-12 PROCEDURE — 82948 REAGENT STRIP/BLOOD GLUCOSE: CPT

## 2024-12-12 PROCEDURE — 85025 COMPLETE CBC W/AUTO DIFF WBC: CPT

## 2024-12-12 RX ORDER — DILTIAZEM HYDROCHLORIDE 60 MG/1
120 CAPSULE, EXTENDED RELEASE ORAL EVERY 12 HOURS SCHEDULED
Status: DISCONTINUED | OUTPATIENT
Start: 2024-12-12 | End: 2024-12-12 | Stop reason: HOSPADM

## 2024-12-12 RX ORDER — DILTIAZEM HYDROCHLORIDE 120 MG/1
120 CAPSULE, EXTENDED RELEASE ORAL EVERY 12 HOURS SCHEDULED
Qty: 60 CAPSULE | Refills: 0 | Status: SHIPPED | OUTPATIENT
Start: 2024-12-12

## 2024-12-12 RX ORDER — MAGNESIUM SULFATE HEPTAHYDRATE 40 MG/ML
2 INJECTION, SOLUTION INTRAVENOUS ONCE
Status: COMPLETED | OUTPATIENT
Start: 2024-12-12 | End: 2024-12-12

## 2024-12-12 RX ORDER — LIDOCAINE 50 MG/G
1 PATCH TOPICAL DAILY
Qty: 30 PATCH | Refills: 0 | Status: SHIPPED | OUTPATIENT
Start: 2024-12-13

## 2024-12-12 RX ORDER — ACETAMINOPHEN 10 MG/ML
1000 INJECTION, SOLUTION INTRAVENOUS EVERY 8 HOURS PRN
Status: DISCONTINUED | OUTPATIENT
Start: 2024-12-12 | End: 2024-12-12 | Stop reason: HOSPADM

## 2024-12-12 RX ORDER — LEVALBUTEROL INHALATION SOLUTION 0.63 MG/3ML
0.63 SOLUTION RESPIRATORY (INHALATION)
Status: DISCONTINUED | OUTPATIENT
Start: 2024-12-12 | End: 2024-12-12 | Stop reason: HOSPADM

## 2024-12-12 RX ADMIN — FOLIC ACID 1000 MCG: 1 TABLET ORAL at 09:04

## 2024-12-12 RX ADMIN — THIAMINE HCL TAB 100 MG 100 MG: 100 TAB at 09:06

## 2024-12-12 RX ADMIN — NICOTINE 1 PATCH: 21 PATCH, EXTENDED RELEASE TRANSDERMAL at 09:02

## 2024-12-12 RX ADMIN — GABAPENTIN 300 MG: 300 CAPSULE ORAL at 09:06

## 2024-12-12 RX ADMIN — DILTIAZEM HYDROCHLORIDE 60 MG: 60 TABLET ORAL at 05:52

## 2024-12-12 RX ADMIN — METOPROLOL TARTRATE 200 MG: 100 TABLET, FILM COATED ORAL at 09:06

## 2024-12-12 RX ADMIN — LIDOCAINE 5% 1 PATCH: 700 PATCH TOPICAL at 09:03

## 2024-12-12 RX ADMIN — FERROUS SULFATE TAB 325 MG (65 MG ELEMENTAL FE) 325 MG: 325 (65 FE) TAB at 09:06

## 2024-12-12 RX ADMIN — GUAIFENESIN 600 MG: 600 TABLET, EXTENDED RELEASE ORAL at 09:05

## 2024-12-12 RX ADMIN — MAGNESIUM SULFATE HEPTAHYDRATE 2 G: 40 INJECTION, SOLUTION INTRAVENOUS at 09:13

## 2024-12-12 RX ADMIN — PANTOPRAZOLE SODIUM 40 MG: 40 TABLET, DELAYED RELEASE ORAL at 06:14

## 2024-12-12 RX ADMIN — APIXABAN 5 MG: 5 TABLET, FILM COATED ORAL at 09:04

## 2024-12-12 RX ADMIN — SODIUM CHLORIDE 2 G: 1 TABLET ORAL at 09:06

## 2024-12-12 RX ADMIN — RANOLAZINE 500 MG: 500 TABLET, FILM COATED, EXTENDED RELEASE ORAL at 09:04

## 2024-12-12 RX ADMIN — Medication 800 MG: at 09:06

## 2024-12-12 RX ADMIN — FLUTICASONE FUROATE AND VILANTEROL TRIFENATATE 1 PUFF: 200; 25 POWDER RESPIRATORY (INHALATION) at 09:39

## 2024-12-12 RX ADMIN — TAMSULOSIN HYDROCHLORIDE 0.4 MG: 0.4 CAPSULE ORAL at 09:06

## 2024-12-12 RX ADMIN — ASPIRIN 81 MG CHEWABLE TABLET 81 MG: 81 TABLET CHEWABLE at 09:04

## 2024-12-12 NOTE — UTILIZATION REVIEW
Initial Clinical Review    Admission: Date/Time/Statement:   Admission Orders (From admission, onward)       Ordered        12/11/24 1646  Place in Observation  Once                          Orders Placed This Encounter   Procedures    Place in Observation     Standing Status:   Standing     Number of Occurrences:   1     Level of Care:   Med Surg [16]     ED Arrival Information       Expected   -    Arrival   12/11/2024 14:16    Acuity   Urgent              Means of arrival   Ambulance    Escorted by   [facility]    Service   Hospitalist    Admission type   Emergency              Arrival complaint   weakness             Chief Complaint   Patient presents with    Weakness - Generalized     Patient states increased weakness since discharge, unable to walk.        Initial Presentation:   64 yom to ER from home via EMS. Pt just left AMA from inpatient here 2 days ago. He reportedly fell at home then, seen at [facility] for head injury - had negative CT brain and C-spine and CXR with NAD, rib fxs 6/7. Pt. Says he can't walk at home and needs to go to rehab. He c/o low back pain from the fall. He feels weak all over. Presents with abrasion/scab top back of scalp, + ttp mid lumbar midline spine.    Placed in observation status for elevated troponin. Plan: telemetry, accuchecks, consult cardio         ED Treatment-Medication Administration from 12/11/2024 1416 to 12/11/2024 1939         Date/Time Order Dose Route Action     12/11/2024 1757 ketorolac (TORADOL) injection 15 mg 15 mg Intravenous Given     12/11/2024 1909 apixaban (ELIQUIS) tablet 5 mg 5 mg Oral Given     12/11/2024 1909 diltiazem (CARDIZEM) tablet 60 mg 60 mg Oral Given     12/11/2024 1909 magnesium Oxide (MAG-OX) tablet 800 mg 800 mg Oral Given     12/11/2024 1909 pantoprazole (PROTONIX) EC tablet 40 mg 40 mg Oral Given            Scheduled Medications:  apixaban, 5 mg, Oral, BID  aspirin, 81 mg, Oral, Daily  diltiazem, 60 mg, Oral, Q6H  SEDA  ferrous sulfate, 325 mg, Oral, Daily With Breakfast  fluticasone-vilanterol, 1 puff, Inhalation, Daily  folic acid, 1,000 mcg, Oral, Daily  gabapentin, 300 mg, Oral, TID  guaiFENesin, 600 mg, Oral, Q12H SEDA  insulin lispro, 1-6 Units, Subcutaneous, TID AC  lidocaine, 1 patch, Topical, Daily  magnesium Oxide, 800 mg, Oral, BID  magnesium sulfate, 2 g, Intravenous, Once  metoprolol tartrate, 200 mg, Oral, Q12H SEDA  nicotine, 1 patch, Transdermal, Daily  pantoprazole, 40 mg, Oral, BID AC  ranolazine, 500 mg, Oral, Q12H SEDA  sodium chloride, 2 g, Oral, BID With Meals  tamsulosin, 0.4 mg, Oral, Daily  vitamin B-1, 100 mg, Oral, Daily      Continuous IV Infusions:     PRN Meds:  acetaminophen, 1,000 mg, Intravenous, Q8H PRN  acetaminophen, 650 mg, Oral, Q6H PRN  oxyCODONE, 2.5 mg, Oral, Q4H PRN      ED Triage Vitals   Temperature Pulse Respirations Blood Pressure SpO2 Pain Score   12/11/24 1425 12/11/24 1425 12/11/24 1425 12/11/24 1425 12/11/24 1425 12/11/24 2039   98.5 °F (36.9 °C) 105 18 126/76 95 % 8     Weight (last 2 days)       Date/Time Weight    12/12/24 0546 83.9 (185)    12/11/24 1944 83.3 (183.7)            Vital Signs (last 3 days)       Date/Time Temp Pulse Resp BP MAP (mmHg) SpO2 O2 Device Patient Position - Orthostatic VS CIWA-Ar Total Pain    12/12/24 05:49:48 -- 68 18 123/66 85 96 % -- -- -- --    12/12/24 02:54:46 97.5 °F (36.4 °C) 58 14 93/58 70 95 % -- -- -- --    12/12/24 00:40:29 -- 58 18 90/58 69 97 % -- -- -- --    12/11/24 22:13:57 -- 58 17 105/67 80 95 % -- -- 1 --    12/11/24 2043 -- -- -- -- -- -- -- -- -- No Pain    12/11/24 2042 98.4 °F (36.9 °C) 83 20 130/85 -- -- -- -- -- --    12/11/24 2039 -- -- -- -- -- -- -- -- -- 8    12/11/24 1944 98.4 °F (36.9 °C) 83 20 130/85 -- 95 % None (Room air) Lying 4 --    12/11/24 1836 99.1 °F (37.3 °C) 102 18 125/76 -- 97 % None (Room air) Lying -- --    12/11/24 1425 98.5 °F (36.9 °C) 105 18 126/76 96 95 % None (Room air) Lying -- --           ROSAURA-Ar  Score       Row Name 12/11/24 22:13:57 12/11/24 1944          CIWA-Ar    Nausea and Vomiting 0 0     Tactile Disturbances 0 0     Tremor 1 1     Auditory Disturbances 0 0     Paroxysmal Sweats 0 0     Visual Disturbances 0 0     Anxiety 0 1     Headache, Fullness in Head 0 1     Agitation 0 1     Orientation and Clouding of Sensorium 0 0     CIWA-Ar Total 1 4                     Pertinent Labs/Diagnostic Test Results:   Radiology:  CT head without contrast   Final Interpretation by Rikki Hines MD (12/11 1618)      No acute intracranial abnormality.                  Workstation performed: [id number]         CT lumbar spine without contrast   Final Interpretation by Rikki Hines MD (12/11 1617)      1.  No acute osseous abnormality.   2.  Degenerative changes as described.   3.  See comments above for full analysis.      Workstation performed: [id number]         XR chest 1 view portable   Final Interpretation by Daryn Cheung MD (12/11 7586)      Mild pulmonary vascular congestion. New small left pleural effusion.            Workstation performed: [id number]           Cardiology:  No orders to display     GI:  No orders to display           Results from last 7 days   Lab Units 12/12/24  0519 12/11/24  1504 12/09/24  0433 12/08/24  0605 12/07/24  1309   WBC Thousand/uL 6.25 8.62 6.28 7.45 5.74   HEMOGLOBIN g/dL 10.2* 10.4* 10.9* 11.0* 11.6*   HEMATOCRIT % 31.3* 32.1* 34.2* 34.8* 35.4*   PLATELETS Thousands/uL 122* 123* 116* 159 192   TOTAL NEUT ABS Thousands/µL 3.25 6.44 4.44 5.51 3.24         Results from last 7 days   Lab Units 12/12/24  0519 12/11/24  1504 12/09/24  0433 12/08/24  0605 12/07/24  1309   SODIUM mmol/L 134* 135 134* 140 144   POTASSIUM mmol/L 3.9 4.2 4.0 4.7 4.6   CHLORIDE mmol/L 99 100 98 105 105   CO2 mmol/L 24 26 31 26 25   ANION GAP mmol/L 11 9 5 9 14*   BUN mg/dL 22 16 15 15 16   CREATININE mg/dL 1.28 0.95 1.04 0.88 1.02   EGFR ml/min/1.73sq m 59 85 76 92 78   CALCIUM mg/dL 8.1* 8.9 7.9*  8.0* 8.3*   MAGNESIUM mg/dL 1.8*  --  1.6* 1.6* 1.6*   PHOSPHORUS mg/dL 4.0  --   --   --   --      Results from last 7 days   Lab Units 12/12/24  0519 12/11/24  1504 12/09/24  0433 12/08/24  0605 12/07/24  1309   AST U/L 21 33 22 35 46*   ALT U/L 14 20 20 28 31   ALK PHOS U/L 89 105* 91 112* 108*   TOTAL PROTEIN g/dL 6.4 6.9 6.2* 6.6 7.0   ALBUMIN g/dL 3.2* 3.2* 3.3* 3.6 3.6   TOTAL BILIRUBIN mg/dL 0.65 0.81 0.71 0.76 0.51     Results from last 7 days   Lab Units 12/12/24  0709 12/11/24  2126 12/09/24  1059 12/09/24  0719 12/08/24  2107 12/08/24  1558 12/08/24  1112 12/08/24  0714 12/07/24  2132   POC GLUCOSE mg/dl 112 105 126 140 157* 129 161* 116 108     Results from last 7 days   Lab Units 12/12/24  0519 12/11/24  1504 12/09/24  0433 12/08/24  0605 12/07/24  1309   GLUCOSE RANDOM mg/dL 85 97 118 122 77             BETA-HYDROXYBUTYRATE   Date Value Ref Range Status   01/22/2024 3.2 (H) <0.6 mmol/L Final                  Results from last 7 days   Lab Units 12/11/24  1623   CK TOTAL U/L 219     Results from last 7 days   Lab Units 12/11/24  1908 12/11/24  1657 12/11/24  1504 12/07/24  1813 12/07/24  1309   HS TNI 0HR ng/L  --   --  512*  --  116*   HS TNI 2HR ng/L  --  529*  --  107*  --    HSTNI D2 ng/L  --  17  --  -9  --    HS TNI 4HR ng/L 536*  --   --   --   --    HSTNI D4 ng/L 24*  --   --   --   --      Results from last 7 days   Lab Units 12/07/24  1309   D-DIMER QUANTITATIVE ug/ml FEU 2.16*     Results from last 7 days   Lab Units 12/07/24  1309   PROTIME seconds 14.4   INR  1.07   PTT seconds 29                     Results from last 7 days   Lab Units 12/07/24  1309   DIGOXIN LVL ng/mL <0.3*     Results from last 7 days   Lab Units 12/11/24  1504 12/07/24  1309   BNP pg/mL 747* 131*                     Results from last 7 days   Lab Units 12/07/24  1309   LIPASE u/L 87*                 Results from last 7 days   Lab Units 12/07/24  1846   CLARITY UA  Clear   COLOR UA  Yellow   SPEC GRAV UA  1.020   PH UA   6.0   GLUCOSE UA mg/dl Negative   KETONES UA mg/dl Negative   BLOOD UA  Negative   PROTEIN UA mg/dl 30 (1+)*   NITRITE UA  Negative   BILIRUBIN UA  Negative   UROBILINOGEN UA (BE) mg/dl <2.0   LEUKOCYTES UA  Negative   WBC UA /hpf 0-5   RBC UA /hpf None Seen   BACTERIA UA /hpf Occasional   EPITHELIAL CELLS WET PREP /hpf Occasional             Results from last 7 days   Lab Units 12/07/24  1846   AMPH/METH  Negative   BARBITURATE UR  Positive*   BENZODIAZEPINE UR  Positive*   COCAINE UR  Negative   METHADONE URINE  Negative   OPIATE UR  Negative   PCP UR  Negative   THC UR  Negative     Results from last 7 days   Lab Units 12/11/24  1623 12/07/24  1309   ETHANOL LVL mg/dL <10 428*                                   Past Medical History:   Diagnosis Date    Acute on chronic kidney failure  (HCC)     Alcohol withdrawal ([facility]) 06/07/2019    Atrial fibrillation (HCC)     Cancer (HCC)     prostate ca,had radiation    Cardiac disease     stents,then triple bypass    COPD (chronic obstructive pulmonary disease) ([facility])     Coronary artery disease     ETOH abuse     Heart failure ([facility])     History of heart surgery     [facility]    Hx of heart artery stent     2014    Hyperlipidemia     Hypertension     Hyponatremia 10/17/2019    Hypovolemic shock (HCC) 12/22/2019    Lumbar spondylitis ([facility]) 10/13/2022    Metabolic acidosis, increased anion gap 04/21/2021    Nasal bone fracture 10/10/2022    Prostate CA ([facility])     S/P CABG x 3     2004    Sleep apnea      Present on Admission:   COPD, severity to be determined ([facility])   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 disease (HCC)   Alcohol abuse   Chronic heart failure with preserved ejection fraction ([facility])   Stage 3a chronic kidney disease ([facility])   Paroxysmal atrial fibrillation (HCC)   Hypertension   Fall, initial encounter   Elevated troponin   Ambulatory dysfunction   BPH (benign prostatic hyperplasia)   GERD (gastroesophageal reflux  disease)      Admitting Diagnosis: ETOH abuse [F10.10]  Elevated troponin [R79.89]  Unable to walk [R26.2]  Generalized weakness [R53.1]  Falls [R29.6]  Age/Sex: 64 y.o. male    Network Utilization Review Department  ATTENTION: Please call with any questions or concerns to [phone] and carefully listen to the prompts so that you are directed to the right person. All voicemails are confidential.   For Discharge needs, contact Care Management DC Support Team at [phone] opt. 2  Send all requests for admission clinical reviews, approved or denied determinations and any other requests to dedicated fax number below belonging to the campus where the patient is receiving treatment. List of dedicated fax numbers for the Facilities:  FACILITY NAME UR FAX NUMBER   ADMISSION DENIALS (Administrative/Medical Necessity) [phone]   DISCHARGE SUPPORT TEAM (NETWORK) [phone]   PARENT CHILD HEALTH (Maternity/NICU/Pediatrics) [phone]   [facility] [phone]   [facility] [phone]   [facility] [phone]   [facility] [phone]   [facility] [phone]   [facility] [phone]   [facility] [phone]   [facility] [phone]   [facility] [phone]   [facility] [phone]   [facility] [phone]   [facility] [phone]

## 2024-12-12 NOTE — ASSESSMENT & PLAN NOTE
Chronic, stable  Pt reports feeling well and denies chest pain, chest tightness or cough. Has SOB at baseline    Continue home inhalers Breo-ellipta 200-25 Mcg  Follow-up with PCP outpatient

## 2024-12-12 NOTE — ASSESSMENT & PLAN NOTE
Wt Readings from Last 3 Encounters:   12/12/24 83.9 kg (185 lb)   12/09/24 84 kg (185 lb 3 oz)   11/28/24 83 kg (182 lb 15.7 oz)   patient appears to be euvolemic  8/16/2024 TTE: EF visibly estimated 65%, left atrium is severely dilated, there is mild to moderate mitral valve regurgitation and mild to moderate tricuspid valve regurgitation with peak PA pressure of 40 to 45 mmHg  continue Lopressor  200 mg BID  low sodium diet  monitor I and O, daily weights and labs

## 2024-12-12 NOTE — ASSESSMENT & PLAN NOTE
Lab Results   Component Value Date    HGBA1C 5.2 11/14/2024       Recent Labs     12/11/24  2126 12/12/24  0709 12/12/24  1128   POCGLU 105 112 139         Blood Sugar Average: Last 72 hrs:  (P) [id number]    Resume home metformin 500 mg  Follow-up with PCP outpatient

## 2024-12-12 NOTE — OCCUPATIONAL THERAPY NOTE
OT EVALUATION       12/12/24 2501   Note Type   Note type Screen   Additional Comments Patient is independent with ADLS per PT, no skilled OT needs required at this time.   Discharge Recommendation   Rehab Resource Intensity Level, OT No post-acute rehabilitation needs   Licensure   [place] License Number  Ursula Reyes MS OTR/L [id number]

## 2024-12-12 NOTE — ASSESSMENT & PLAN NOTE
Pt was found to have elevated troponin in the ED.   HS (0) 512; HS (2) 529  ; HS (4) 539     Pt denies any chest pain or chest tightness has SOB at baseline. Most likely a non-ischemic troponin elevation. Pt did have a fall yesterday where he injured his head and back.     24hr telemonitoring ordered  Cardiology consulted  Monitor for any new onset of chest pain  Follow up from Creatinine Kinase levels

## 2024-12-12 NOTE — ASSESSMENT & PLAN NOTE
Per patient he had his last drink of vodka was 2 days ago.   Etoh level <10    Ciwa protocol   Thiamine 100mg daily  Folic acid 1000mg QD  Monitor vitals signs

## 2024-12-12 NOTE — ASSESSMENT & PLAN NOTE
Lab Results   Component Value Date    HGBA1C 5.2 11/14/2024       Recent Labs     12/09/24  1059 12/11/24  2126 12/12/24  0709   POCGLU 126 105 112       Blood Sugar Average: Last 72 hrs:  (P) 108.5  managed per primary team

## 2024-12-12 NOTE — PLAN OF CARE
Problem: PAIN - ADULT  Goal: Verbalizes/displays adequate comfort level or baseline comfort level  Description: Interventions:  - Encourage patient to monitor pain and request assistance  - Assess pain using appropriate pain scale  - Administer analgesics based on type and severity of pain and evaluate response  - Implement non-pharmacological measures as appropriate and evaluate response  - Consider cultural and social influences on pain and pain management  - Notify physician/advanced practitioner if interventions unsuccessful or patient reports new pain  Outcome: Progressing     Problem: METABOLIC, FLUID AND ELECTROLYTES - ADULT  Goal: Fluid balance maintained  Description: INTERVENTIONS:  - Monitor labs   - Monitor I/O and WT  - Instruct patient on fluid and nutrition as appropriate  - Assess for signs & symptoms of volume excess or deficit  Outcome: Progressing

## 2024-12-12 NOTE — ASSESSMENT & PLAN NOTE
Chronic, stable  Continue Lopressor 200mg BID, Metoprolol 100mg BID  Per cardiology, initiated Cardizem  mg twice daily  Discontinued amlodipine 5 mg in the setting of new Cardizem dosage change  Follow-up with PCP and cardiology

## 2024-12-12 NOTE — ASSESSMENT & PLAN NOTE
8/16/2024 TTE: EF visibly estimated 65%, left atrium is severely dilated, there is mild to moderate mitral valve regurgitation and mild to moderate tricuspid valve regurgitation with peak PA pressure of 40 to 45 mmHg  continue Lopressor 200 mg BID  change short acting Cardizem to Cardizem  mg twice a day  patient currently on Eliquis 5 mg BID

## 2024-12-12 NOTE — ASSESSMENT & PLAN NOTE
Chronic,  Triple bypass surgery in 2004. Pt denies any chest pain on initial assessment.   Continue home Lopressor 200mg BID, Ranexa 500mg Q12h    Follow-up with PCP and Cardiology outpatient

## 2024-12-12 NOTE — ASSESSMENT & PLAN NOTE
Wt Readings from Last 3 Encounters:   12/11/24 83.3 kg (183 lb 11.2 oz)   12/09/24 84 kg (185 lb 3 oz)   11/28/24 83 kg (182 lb 15.7 oz)   Chronic,   Appears euvolemic on admission, no bilateral leg edema or weight gain since last admission.    BNP-747  Echo (8/2024) -EF 65%, systolic function is normal.  Left atrium is severely dilated.    Will try a dose of lasix 20mg  Monitor daily standing weight  Monitor I and O's

## 2024-12-12 NOTE — ASSESSMENT & PLAN NOTE
Pt was found to have elevated troponin in the ED.   HS (0) 512; HS (2) 529  ; HS (4) 539   Pt denies any chest pain or chest tightness has SOB at baseline. Most likely a non-ischemic troponin elevation. Pt did have a fall yesterday where he injured his head and back.     Chest x-ray: Mild pulmonary vascular congestion  CT lumbar spine wo contrast: No acute osseous abnormality, degenerative changes noted  CT head: No acute intracranial abnormality.    Random troponin level the morning after admission was 443, indicating that troponin levels peaked. Cardiology was consulted, and determined lab abnormalities were likely a non-ischemic troponin elevation. This could be likely secondary from his fall, A.fib, or recent alcohol use prior to presentation. No interventions were recommended.  Patient remained stable without chest pain throughout the remainder of his hospital course.       Follow-up with PCP and cardiology

## 2024-12-12 NOTE — CASE MANAGEMENT
Case Management Discharge Planning Note    Patient name Gregg Partida  Location [address] [address]/[address] [place] [address]-* MRN [id number]  : 1960 Date 2024       Current Admission Date: 2024  Current Admission Diagnosis:COPD, severity to be determined (HCC)   Patient Active Problem List    Diagnosis Date Noted Date Diagnosed    Smoking 2024     Mucus plugging of bronchi 2024     Acute respiratory failure with hypoxia (HCC) 2024     Chronic hyponatremia 2024     Closed fracture of one rib of left side 2024     Ambulatory dysfunction 2024     Financial insecurity 2024     BPH (benign prostatic hyperplasia) 2024     Chronic alcohol use 2024     Hypertension 2024     Hemorrhagic cystitis 2024     Hypothyroidism 2024     Gastrointestinal hemorrhage with melena 10/29/2023     Chest pain 2023     Longstanding persistent atrial fibrillation (HCC) 2023     GERD (gastroesophageal reflux disease) 2023     Stable angina (HCC) 2022     Stage 3a chronic kidney disease (HCC) 2022     Fatty liver, alcoholic 04/15/2022     Nonischemic nontraumatic myocardial injury 04/10/2022     History of atrial fibrillation 10/25/2021     Mild protein-calorie malnutrition (HCC) 2021     Prostate cancer (HCC) 2021     Atrial fibrillation (HCC) 2020     Chronic heart failure with preserved ejection fraction (HCC) 2019     Noncompliance 2019     Alcohol abuse 2019     Alcohol withdrawal syndrome with complication (HCC) 10/17/2019     Electrolyte abnormality 10/17/2019     Cervical spinal stenosis 10/17/2019     History of prostate cancer 2019     CAD (coronary artery disease) 2019     Nicotine abuse 2019     Hypomagnesemia 10/10/2018     Depression, recurrent (HCC) 10/10/2018     Hx of CABG 10/10/2018     Dyslipidemia 10/10/2018     COPD, severity to be determined (HCC) 10/09/2018      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 disease (HCC) 10/09/2018     Hypertensive heart and chronic kidney disease with heart failure and stage 1 through stage 4 chronic kidney disease, or chronic kidney disease (HCC) 10/09/2018       LOS (days): 0  Geometric Mean LOS (GMLOS) (days):   Days to GMLOS:     OBJECTIVE:       Current admission status: Observation   Preferred Pharmacy:   [facility] [place], [place] - [address]  [address] [address]  Phone: [phone] Fax: [phone]    [id number] - [place], [place] - [address]  [address]  [facility] [address]  Phone: [phone] Fax: [phone]    Primary Care Provider: Lilliana Bliss MD    Primary Insurance: [employer] REP  Secondary Insurance:     DISCHARGE DETAILS:    Discharge planning discussed with:: Patient  Freedom of Choice: Yes    Comments - Freedom of Choice: Patient's preference is to return home at discharge.  SW was notified by resident that patient needs transportation home and spoke with patient at bedside.  Patient requested transport that will wheel him to the entrance of his apartment building and SW offered to request a WC van but explained that patient will incur a cost for the transport.  Patient attempted to call his son but was not able to reach him.  Patient refused the WC van and requested a Lyft instead.  Patient signed waiver and waiver was placed in scan bin for chart.  Lyft was requested in [employer] and nursing notified of pickup time.       Were Treatment Team discharge recommendations reviewed with patient/caregiver?: Yes  Did patient/caregiver verbalize understanding of patient care needs?: Yes  Were patient/caregiver advised of the risks associated with not following Treatment Team discharge recommendations?: Yes    Requested Home Health Care         Is the patient interested in HHC at discharge?: No    DME Referral Provided  Referral made for DME?:  No    Other Referral/Resources/Interventions Provided:  Interventions: Transportation    Would you like to participate in our [facility] service program?  : No - Declined    Treatment Team Recommendation: Home  Discharge Destination Plan:: Home  Transport at Discharge : Ride Share     Number/Name of Dispatcher: [employer] via [employer]  Transported by (Company and Unit #): [place]  ETA of Transport (Date): 12/12/24  ETA of Transport (Time): 3383

## 2024-12-12 NOTE — ASSESSMENT & PLAN NOTE
Pt states that he fell backwards on this back from the walker yesterday afternoon. Pt was taken to the [facility], and had a head laceration repair. CT head, X_ray trauma spine and chest x-rays were normal. Pt still c/o of 7/10 low midline back pain, which is worse with movement and tender to palpation.     Pain regimen: Tylenol 650 Q6 PRN, Toradol 15mg Q6 PRN and Oxycodone 5mg Q4 PRN for severe pain  Lidocaine patch for the back  Heat applied to the area

## 2024-12-12 NOTE — ASSESSMENT & PLAN NOTE
Pt has increased weakness since he left AMA on 12/6. Since his fall yesterday he has difficulty walking and feels very weak. According to patient, he wants to get therapy to feel strong on his feet.     PT assessed in hospital, [facility] Basic Mobility Inpatient Short Form Raw Score is 20. A Raw score of greater than 16 suggests the patient may benefit from discharge to home.     PT/OT referral ordered  Follow-up with PCP

## 2024-12-12 NOTE — ASSESSMENT & PLAN NOTE
Chronic, stable  Pt reports feeling well and denies chest pain, chest tightness or cough. Has SOB at baseline  Continue home inhalers Breo-ellipta 200-25 Mcg

## 2024-12-12 NOTE — ASSESSMENT & PLAN NOTE
Hx of noncompliance with home medications. Also left AMA during last admission on 12/7 from the ICU

## 2024-12-12 NOTE — ASSESSMENT & PLAN NOTE
Pt states that he fell backwards on this back from the walker yesterday afternoon. Pt was taken to the [facility], and had a head laceration repair. CT head, X_ray trauma spine and chest x-rays were normal. On initial assessment, Pt still c/o of 7/10 low midline back pain, which is worse with movement and tender to palpation.     Pain regimen: Tylenol 650 Q6 PRN, IV Tylenol 1g Q8h for moderate pain, Oxycodone 2.5mg Q4 PRN for severe pain.  Lidocaine patch and heat for her back.  Patient's pain was well-controlled with this regimen.    Follow-up with PCP  Referral sent for outpatient PT/OT

## 2024-12-12 NOTE — HOSPITAL COURSE
Patient is a 64-year-old male w/ PMH of A.fib, alcohol dependence, CHF, COPD, T2DM w/ stage 3a CKD, history of CABG, GERD, HTN, BPH, nicotine dependence who presented to the hospital on 12/11/2024 complaining of weakness after a fall. Prior to presentation, patient experienced a ground-level fall while using his walker. He sustained a laceration to the head, which was successfully repaired at [facility]. He presented to [place] ED with progressive weakness. ED labs were notable for an elevated troponin of 512. Patient did not have chest pain at that time. He was admitted for observation. Because of his A.fib history, he was placed on telemetry. He was in A.fib without RVR and was rate controlled. Cardiology was consulted, and determined lab abnormalities were likely a non-ischemic troponin elevation. This could be likely secondary from his fall, A.fib, or recent alcohol use prior to presentation. No interventions were recommended. Cardiology adjusted his A.fib medication to Cardizem SR 120mg BID. Patient was stable throughout the remainder of his hospital course. He worked with PT and determined he was at his baseline mobility status. He was assessed and determined to be stable for discharge on 12/12/2024, with instruction for new medication adherence and close follow-up with PCP, outpatient PT, and Cardiology.

## 2024-12-12 NOTE — ASSESSMENT & PLAN NOTE
Lab Results   Component Value Date    HGBA1C 5.2 11/14/2024       Recent Labs     12/08/24  2107 12/09/24  0719 12/09/24  1059   POCGLU 157* 140 126       Blood Sugar Average: Last 72 hrs:    Held home metformin 500mg  ISS  Accuchecks ACHS  Carb control diabetic diet

## 2024-12-12 NOTE — ASSESSMENT & PLAN NOTE
Lab Results   Component Value Date    HGBA1C 5.2 11/14/2024       Recent Labs     12/09/24  1059 12/11/24  2126 12/12/24  0709   POCGLU 126 105 112       Blood Sugar Average: Last 72 hrs:  (P) 108.5  Held home metformin 500mg  ISS  Accuchecks ACHS  Carb control diabetic diet

## 2024-12-12 NOTE — H&P
H&P - Family Medicine   Name: Gregg Partida 64 y.o. male I MRN: [id number]  Unit/Bed#: [address] [place] [address] I Date of Admission: 12/11/2024   Date of Service: 12/11/2024 I Hospital Day: 0     Assessment & Plan  Elevated troponin  Pt was found to have elevated troponin in the ED.   HS (0) 512; HS (2) 529  ; HS (4) 539     Pt denies any chest pain or chest tightness has SOB at baseline. Most likely a non-ischemic troponin elevation. Pt did have a fall yesterday where he injured his head and back.     24hr telemonitoring ordered  Cardiology consulted  Monitor for any new onset of chest pain  Follow up from Creatinine Kinase levels  Alcohol abuse  Per patient he had his last drink of vodka was 2 days ago.   Etoh level <10    Ciwa protocol   Thiamine 100mg daily  Folic acid 1000mg QD  Monitor vitals signs    Chronic heart failure with preserved ejection fraction (HCC)  Wt Readings from Last 3 Encounters:   12/11/24 83.3 kg (183 lb 11.2 oz)   12/09/24 84 kg (185 lb 3 oz)   11/28/24 83 kg (182 lb 15.7 oz)   Chronic,   Appears euvolemic on admission, no bilateral leg edema or weight gain since last admission.    BNP-747  Echo (8/2024) -EF 65%, systolic function is normal.  Left atrium is severely dilated.    Will try a dose of lasix 20mg  Monitor daily standing weight  Monitor I and O's    Paroxysmal atrial fibrillation (HCC)  Chronic, stable    EKG: A.fib w/o RVR, Twave inversion    Continue Ranexa 12  mg BID  Continue Lopressor 200mg Q12  24hr Tele monitoring  Cardizem 60mg Q6H  Continue Eliquis 5mg BID  Monitor Heartrate  Ambulatory dysfunction  Pt has increased weakness since he left A on 12/6. Since his fall yesterday he has difficulty walking and feels very weak. According to patient, he wants to get therapy to feel strong on his feet.     PT/OT referral ordered  Fall precautions  COPD, severity to be determined (HCC)  Chronic, stable  Pt reports feeling well and denies chest pain, chest tightness or cough. Has  SOB at baseline  Continue home inhalers Breo-ellipta 200-25 Mcg    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 disease (HCC)  Lab Results   Component Value Date    HGBA1C 5.2 11/14/2024       Recent Labs     12/08/24 2107 12/09/24  0719 12/09/24  1059   POCGLU 157* 140 126       Blood Sugar Average: Last 72 hrs:    Held home metformin 500mg  ISS  Accuchecks ACHS  Carb control diabetic diet  Fall, initial encounter  Pt states that he fell backwards on this back from the walker yesterday afternoon. [facility] and had a head laceration repair. CT head, X_ray trauma spine and chest x-rays were normal. Pt still c/o of 7/10 low midline back pain, which is worse with movement and tender to palpation.     Pain regimen: Tylenol 650 Q6 PRN, Toradol 15mg Q6 PRN and Oxycodone 5mg Q4 PRN for severe pain  Lidocaine patch for the back  Heat applied to the area  Hx of CABG  Chronic,  Triple bypass surgery in 2004. Pt denies an chest pain    Noncompliance  Hx of noncompliance with home medications. Also left AMA during last admission on 12/7 from the ICU  Stage 3a chronic kidney disease (HCC)  Lab Results   Component Value Date    EGFR 85 12/11/2024    EGFR 76 12/09/2024    EGFR 92 12/08/2024    CREATININE 0.95 12/11/2024    CREATININE 1.04 12/09/2024    CREATININE 0.88 12/08/2024     Baseline Cr: 0.95-1.2  Avoid nephrotoxic medications  Monitor Urine output  GERD (gastroesophageal reflux disease)  Continue protonix 40 mg BID  Hypertension  Chronic, stable  Continue Lopressor 200mg BID and cardizem 60mg Q6H, Metoprolol 100mg BID and amlodipine 5mg QD  BPH (benign prostatic hyperplasia)  Chronic, stable  Continue tamsulosin 0.4mg QD    Smoking  Pt states that smokes 1.5 pk  Nicotine patch 21mg     Code Status: Level 1 - Full Code  Admission Status: OBSERVATION  Disposition: Patient requires Med Surg      History of Present Illness     Patient is a 64-year-old male with past medical history of  hypertension, BPH, GERD, DM, stage IIIa CKD presents today due to weakness.  Patient states that yesterday when he was sitting on his walker going down he hit a curb and fell backwards on his back and head.a hill he had some bleeding from his head and was taken to the [facility].  Imaging done at the hospital was negative and patient's laceration on his head was repaired.  Patient states that he still feels very weak on his feet bilaterally.  His last drink was 2 days ago  States that he has baseline shortness of breath. Denies any chest pain, chest tightness or cough.      Review of Systems   Constitutional:  Positive for fatigue. Negative for chills and fever.   HENT:  Negative for ear pain and sore throat.    Eyes:  Negative for pain and visual disturbance.   Respiratory:  Positive for shortness of breath. Negative for cough and chest tightness.    Cardiovascular:  Negative for chest pain and palpitations.   Gastrointestinal:  Negative for abdominal pain, diarrhea, nausea and vomiting.   Genitourinary:  Negative for dysuria and hematuria.   Musculoskeletal:  Positive for back pain. Negative for arthralgias.   Skin:  Negative for color change and rash.   Neurological:  Positive for weakness. Negative for dizziness, seizures and syncope.   All other systems reviewed and are negative.    I have reviewed the patient's PMH, PSH, Social History, Family History, Meds, and Allergies    Objective :  Temp:  [98.4 °F (36.9 °C)-99.1 °F (37.3 °C)] 98.4 °F (36.9 °C)  HR:  [] 83  BP: (125-130)/(76-85) 130/85  Resp:  [18-20] 20  SpO2:  [95 %-97 %] 95 %  O2 Device: None (Room air)    Intake & Output:  I/O       None          Weights:   IBW (Ideal Body Weight): 82.2 kg    Body mass index is 23.59 kg/m².  Weight (last 2 days)       Date/Time Weight    12/11/24 1944 83.3 (183.7)          Physical Exam  Vitals and nursing note reviewed.   Constitutional:       General: He is not in acute distress.     Appearance: He is  well-developed.   HENT:      Head: Normocephalic and atraumatic.   Eyes:      Conjunctiva/sclera: Conjunctivae normal.   Cardiovascular:      Rate and Rhythm: Normal rate. Rhythm irregular.      Heart sounds: No murmur heard.  Pulmonary:      Effort: Pulmonary effort is normal. No respiratory distress.      Breath sounds: Normal breath sounds.   Abdominal:      General: Abdomen is flat.      Palpations: Abdomen is soft.      Tenderness: There is no abdominal tenderness.   Musculoskeletal:         General: Tenderness (midline low back) present. No swelling.      Cervical back: Neck supple.      Right lower leg: No edema.      Left lower leg: No edema.   Skin:     General: Skin is warm and dry.      Capillary Refill: Capillary refill takes less than 2 seconds.   Neurological:      Mental Status: He is alert.   Psychiatric:         Mood and Affect: Mood normal.           Lab Results: I have reviewed the following results:  Recent Labs     12/09/24  0433 12/11/24  1504 12/11/24  1657   WBC 6.28 8.62  --    HGB 10.9* 10.4*  --    HCT 34.2* 32.1*  --    * 123*  --    SODIUM 134* 135  --    K 4.0 4.2  --    CL 98 100  --    CO2 31 26  --    BUN 15 16  --    CREATININE 1.04 0.95  --    GLUC 118 97  --    MG 1.6*  --   --    AST 22 33  --    ALT 20 20  --    ALB 3.3* 3.2*  --    TBILI 0.71 0.81  --    ALKPHOS 91 105*  --    HSTNI0  --  512*  --    HSTNI2  --   --  529*   BNP  --  747*  --      Micro:  Lab Results   Component Value Date    BLOODCX No Growth After 5 Days. 11/16/2024    BLOODCX No Growth After 5 Days. 11/16/2024    BLOODCX No Growth After 5 Days. 05/10/2024    URINECX No Growth <1000 cfu/mL 10/29/2023       Imaging Results Review: I personally reviewed the following image studies/reports in PACS and discussed pertinent findings with Radiology: chest xray. My interpretation of the radiology images/reports is: mild pulmonary vascular congestion.  Other Study Results Review: EKG was reviewed.     Currently  "Ordered Meds:   Current Facility-Administered Medications:     acetaminophen (TYLENOL) tablet 650 mg, Q6H PRN    apixaban (ELIQUIS) tablet 5 mg, BID    [START ON 12/12/2024] aspirin chewable tablet 81 mg, Daily    diltiazem (CARDIZEM) tablet 60 mg, Q6H SEDA    [START ON 12/12/2024] ferrous sulfate tablet 325 mg, Daily With Breakfast    [START ON 12/12/2024] fluticasone-vilanterol 200-25 mcg/actuation 1 puff, Daily    [START ON 12/12/2024] folic acid (FOLVITE) tablet 1,000 mcg, Daily    furosemide (LASIX) injection 20 mg, Once    gabapentin (NEURONTIN) capsule 300 mg, TID    [START ON 12/12/2024] insulin lispro (HumALOG/ADMELOG) 100 units/mL subcutaneous injection 1-6 Units, TID AC **AND** Fingerstick Glucose (POCT), 4x Daily AC and at bedtime    ketorolac (TORADOL) injection 15 mg, Q6H PRN    [START ON 12/12/2024] lidocaine (LIDODERM) 5 % patch 1 patch, Daily    magnesium Oxide (MAG-OX) tablet 800 mg, BID    metoprolol tartrate (LOPRESSOR) tablet 200 mg, Q12H SEDA    [START ON 12/12/2024] nicotine (NICODERM CQ) 21 mg/24 hr TD 24 hr patch 1 patch, Daily    oxyCODONE (ROXICODONE) IR tablet 5 mg, Q4H PRN    pantoprazole (PROTONIX) EC tablet 40 mg, BID AC    ranolazine (RANEXA) 12 hr tablet 500 mg, Q12H SEDA    sodium chloride tablet 2 g, BID With Meals    [START ON 12/12/2024] tamsulosin (FLOMAX) capsule 0.4 mg, Daily    [START ON 12/12/2024] thiamine tablet 100 mg, Daily  VTE Pharmacologic Prophylaxis: VTE covered by:  apixaban, Oral, 5 mg at 12/11/24 1909     VTE Mechanical Prophylaxis: sequential compression device    Administrative Statements   I have spent a total time of 30 minutes in caring for this patient on the day of the visit/encounter including Instructions for management.  Portions of the record may have been created with voice recognition software.  Occasional wrong word or \"sound a like\" substitutions may have occurred due to the inherent limitations of voice recognition software.  Read the chart carefully " and recognize, using context, where substitutions have occurred.  ==  Beverly Freitas MD  [facility]  Internal Medicine Residency PGY-2

## 2024-12-12 NOTE — CONSULTS
Consultation - Cardiology   Name: Gregg Partida 64 y.o. male I MRN: [id number]  Unit/Bed#: [address] [address]-01 I Date of Admission: 12/11/2024   Date of Service: 12/12/2024 I Hospital Day: 0   Inpatient consult to Cardiology  Consult performed by: CHELSEA Diaz  Consult ordered by: Patience Renee MD        Physician Requesting Evaluation: Brandy Connors DO   Reason for Evaluation / Principal Problem: abnormal troponins    Assessment & Plan  Elevated troponin  high-sensitivity troponins: 512 (0hr), 529 (2hr), 536 (4hr)  random high-sensitivity troponin is 443  non-MI troponin elevation secondary to rapid atrial fibrillation, chronic kidney disease, frequent falls and alcohol abuse  continue beta-blocker  Longstanding persistent atrial fibrillation (HCC)  8/16/2024 TTE: EF visibly estimated 65%, left atrium is severely dilated, there is mild to moderate mitral valve regurgitation and mild to moderate tricuspid valve regurgitation with peak PA pressure of 40 to 45 mmHg  continue Lopressor 200 mg BID  change short acting Cardizem to Cardizem  mg twice a day  patient currently on Eliquis 5 mg BID  Chronic heart failure with preserved ejection fraction (HCC)  Wt Readings from Last 3 Encounters:   12/12/24 83.9 kg (185 lb)   12/09/24 84 kg (185 lb 3 oz)   11/28/24 83 kg (182 lb 15.7 oz)   patient appears to be euvolemic  8/16/2024 TTE: EF visibly estimated 65%, left atrium is severely dilated, there is mild to moderate mitral valve regurgitation and mild to moderate tricuspid valve regurgitation with peak PA pressure of 40 to 45 mmHg  continue Lopressor  200 mg BID  low sodium diet  monitor I and O, daily weights and labs          COPD, severity to be determined (HCC)  history of tobacco abuse  Stage 3a chronic kidney disease (HCC)  Lab Results   Component Value Date    EGFR 59 12/12/2024    EGFR 85 12/11/2024    EGFR 76 12/09/2024    CREATININE 1.28 12/12/2024    CREATININE 0.95 12/11/2024    CREATININE 1.04  12/09/2024   appears to be at baseline  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 disease (HCC)  Lab Results   Component Value Date    HGBA1C 5.2 11/14/2024       Recent Labs     12/09/24  1059 12/11/24  2126 12/12/24  0709   POCGLU 126 105 112       Blood Sugar Average: Last 72 hrs:  (P) 108.5  managed per primary team  Hypertension  overall blood pressures controlled  Hx of CABG  history of CABG times three in 2024 and PCI the LAD in 2014  continue Lopressor 200 mg BID  continue Ranexa 500 mg Q 12 hours  Alcohol abuse  alcohol level was less than 10  followed by primary team  Noncompliance  noncompliance with medications and medical follow-up      History of Present Illness   Gregg Partida is a 64 y.o. male who presented to the emergency room yesterday after calling [facility] stating that he felt unsafe at home and was asking for someone to come and check on him. In the emergency room he states he is had increasing weakness since he signed out AMA two days ago and is unable to walk. He does note he had been falling at home.    Labs in the emergency room high-sensitivity troponins were 529, 536 and 443 and relatively flat and not concerning for ACS 12 lead EKG demonstrated atrial fibrillation with control ventricular response, ST and T wave changes but change from previous EKGs.      Patient has history for coronary artery disease with CABG times three in 2024 and PCI the LAD in 2014, hypertension, diabetes, obstructive sleep apnea not use CPAP, chronic alcohol abuse, tobacco abuse, and atrial fibrillation     Review of Systems   Constitutional:  Positive for activity change and fatigue.   HENT: Negative.  Negative for congestion, facial swelling, sinus pain and trouble swallowing.    Eyes: Negative.  Negative for photophobia and visual disturbance.   Respiratory:  Positive for cough and shortness of breath. Negative for chest tightness.    Cardiovascular: Negative.  Negative for chest  pain, palpitations and leg swelling.   Gastrointestinal:  Negative for abdominal distention, diarrhea and nausea.   Endocrine: Negative.  Negative for polydipsia, polyphagia and polyuria.   Genitourinary: Negative.  Negative for difficulty urinating.   Musculoskeletal:  Positive for back pain and gait problem.   Skin: Negative.    Neurological:  Positive for dizziness, weakness and light-headedness. Negative for syncope.   Hematological: Negative.    Psychiatric/Behavioral: Negative.       I have reviewed the patient's PMH, PSH, Social History, Family History, Meds, and Allergies  Historical Information   Past Medical History:   Diagnosis Date    Acute on chronic kidney failure  (HCC)     Alcohol withdrawal ([facility]) 06/07/2019    Atrial fibrillation (HCC)     Cancer ([facility])     prostate ca,had radiation    Cardiac disease     stents,then triple bypass    COPD (chronic obstructive pulmonary disease) ([facility])     Coronary artery disease     ETOH abuse     Heart failure ([facility])     History of heart surgery     says triple bypass [facility]    Hx of heart artery stent     2014    Hyperlipidemia     Hypertension     Hyponatremia 10/17/2019    Hypovolemic shock ([facility]) 12/22/2019    Lumbar spondylitis ([facility]) 10/13/2022    Metabolic acidosis, increased anion gap 04/21/2021    Nasal bone fracture 10/10/2022    Prostate CA ([facility])     S/P CABG x 3     2004    Sleep apnea      Past Surgical History:   Procedure Laterality Date    CARDIAC CATHETERIZATION      2 stents    CORONARY ARTERY BYPASS GRAFT  2004    WI ARTHRD ANT INTERBODY MIN DSC CRV BELOW C2 N/A 12/16/2020    Procedure: Anterior cervical discectomy with fusion C4-C7; Posterior cervical decompression and fusion C2-T2;  Surgeon: David Rowell MD;  Location: [facility];  Service: Neurosurgery    TONSILLECTOMY       Social History     Tobacco Use    Smoking status: Every Day     Current packs/day: 1.50     Average packs/day: 1.5 packs/day for 40.0 years (60.0 ttl pk-yrs)      Types: Cigarettes    Smokeless tobacco: Never   Vaping Use    Vaping status: Never Used   Substance and Sexual Activity    Alcohol use: Yes     Alcohol/week: 4.0 standard drinks of alcohol     Types: 4 Standard drinks or equivalent per week     Comment: quart of vodka daily    Drug use: No    Sexual activity: Not Currently     E-Cigarette/Vaping    E-Cigarette Use Never User      E-Cigarette/Vaping Substances    Nicotine Yes     THC No     CBD No     Flavoring No     Other No     Unknown No      Family History   Problem Relation Age of Onset    Diabetes Mother     Uterine cancer Mother     COPD Father     Hypertension Father      Social History     Tobacco Use    Smoking status: Every Day     Current packs/day: 1.50     Average packs/day: 1.5 packs/day for 40.0 years (60.0 ttl pk-yrs)     Types: Cigarettes    Smokeless tobacco: Never   Vaping Use    Vaping status: Never Used   Substance and Sexual Activity    Alcohol use: Yes     Alcohol/week: 4.0 standard drinks of alcohol     Types: 4 Standard drinks or equivalent per week     Comment: quart of vodka daily    Drug use: No    Sexual activity: Not Currently       Current Facility-Administered Medications:     acetaminophen (Ofirmev) injection 1,000 mg, Q8H PRN    acetaminophen (TYLENOL) tablet 650 mg, Q6H PRN    apixaban (ELIQUIS) tablet 5 mg, BID    aspirin chewable tablet 81 mg, Daily    diltiazem (CARDIZEM SR) 12 hr capsule 120 mg, Q12H SEDA    ferrous sulfate tablet 325 mg, Daily With Breakfast    fluticasone-vilanterol 200-25 mcg/actuation 1 puff, Daily    folic acid (FOLVITE) tablet 1,000 mcg, Daily    gabapentin (NEURONTIN) capsule 300 mg, TID    guaiFENesin (MUCINEX) 12 hr tablet 600 mg, Q12H SEDA    insulin lispro (HumALOG/ADMELOG) 100 units/mL subcutaneous injection 1-6 Units, TID AC **AND** Fingerstick Glucose (POCT), 4x Daily AC and at bedtime    lidocaine (LIDODERM) 5 % patch 1 patch, Daily    magnesium Oxide (MAG-OX) tablet 800 mg, BID    magnesium  sulfate 2 g/50 mL IVPB (premix) 2 g, Once    metoprolol tartrate (LOPRESSOR) tablet 200 mg, Q12H SEDA    nicotine (NICODERM CQ) 21 mg/24 hr TD 24 hr patch 1 patch, Daily    oxyCODONE (ROXICODONE) split tablet 2.5 mg, Q4H PRN    pantoprazole (PROTONIX) EC tablet 40 mg, BID AC    ranolazine (RANEXA) 12 hr tablet 500 mg, Q12H SEDA    sodium chloride tablet 2 g, BID With Meals    tamsulosin (FLOMAX) capsule 0.4 mg, Daily    thiamine tablet 100 mg, Daily  Prior to Admission Medications   Prescriptions Last Dose Informant Patient Reported? Taking?   Blood Glucose Monitoring Suppl (ONE TOUCH ULTRA MINI) w/Device KIT  Self Yes No   Sig: Use as directed   Patient not taking: Reported on 3/25/2024   Blood Pressure Monitoring (Adult Blood Pressure Cuff Lg) KIT   No No   Sig: Use in the morning   Patient not taking: Reported on 3/25/2024   Breo Ellipta 200-25 MCG/ACT inhaler 12/11/2024 Self No Yes   Sig: Inhale 1 puff daily Rinse mouth after use.   ONETOUCH DELICA LANCETS FINE MISC  Self Yes No   Sig: 3 (three) times a day Test   Thiamine HCl (vitamin B-1) 100 MG TABS 12/11/2024 Self No Yes   Sig: TAKE 1 TABLET DAILY   albuterol (Ventolin HFA) 90 mcg/act inhaler  Self No No   Sig: Inhale 2 puffs every 4 (four) hours as needed for wheezing   aluminum-magnesium hydroxide-simethicone (MAALOX) 6416-9293-803 mg/30 mL suspension More than a month  No No   Sig: Take 30 mL by mouth every 4 (four) hours as needed for indigestion or heartburn   amLODIPine (NORVASC) 5 mg tablet 12/11/2024  No Yes   Sig: Take 1 tablet (5 mg total) by mouth daily   apixaban (Eliquis) 5 mg 12/11/2024  No Yes   Sig: TAKE 1 TABLET BY MOUTH 2 TIMES A DAY DO NOT START BEFORE JANUARY 31, 2024.   aspirin (ECOTRIN LOW STRENGTH) 81 mg EC tablet 12/11/2024 Self Yes Yes   Sig: Take 81 mg by mouth daily   diltiazem (CARDIZEM) 60 mg tablet   No No   Sig: Take 1 tablet (60 mg total) by mouth every 6 (six) hours   ferrous sulfate 325 (65 Fe) mg tablet 12/11/2024 Self No Yes    Sig: Take 1 tablet (325 mg total) by mouth daily with breakfast   folic acid (FOLVITE) 1 mg tablet   No No   Sig: TAKE 1 TABLET DAILY   gabapentin (NEURONTIN) 300 mg capsule 12/11/2024  No Yes   Sig: TAKE ONE CAPSULE THREE TIMES DAILY   lidocaine (Lidoderm) 5 %   No No   Sig: Apply 1 patch topically over 12 hours daily Remove & Discard patch within 12 hours or as directed by MD   magnesium Oxide (MAG-OX) 400 mg TABS 12/11/2024  No Yes   Sig: Take 2 tablets (800 mg total) by mouth 2 (two) times a day   metFORMIN (GLUCOPHAGE) 500 mg tablet   No No   Sig: TAKE 1 TABLET DAILY WITH BREAKFAST   metoprolol tartrate (LOPRESSOR) 100 mg tablet 12/11/2024  No Yes   Sig: Take 2 tablets (200 mg total) by mouth every 12 (twelve) hours   naltrexone (REVIA) 50 mg tablet 12/11/2024  No Yes   Sig: Take 1 tablet (50 mg total) by mouth daily   naproxen (Naprosyn) 500 mg tablet   No No   Sig: Take 1 tablet (500 mg total) by mouth 2 (two) times a day with meals   nicotine (NICODERM CQ) 21 mg/24 hr TD 24 hr patch  Self No No   Sig: Place 1 patch on the skin over 24 hours daily Do not start before December 4, 2023.   pantoprazole (PROTONIX) 40 mg tablet 12/11/2024  No Yes   Sig: TAKE 1 TABLET TWO TIMES A DAY   ranolazine (RANEXA) 500 mg 12 hr tablet   No No   Sig: TAKE 1 TABLET EVERY 12 HOURS   senna-docusate sodium (SENOKOT S) 8.6-50 mg per tablet   No No   Sig: Take 1 tablet by mouth daily as needed for constipation   sodium chloride 1 g tablet Not Taking  No No   Sig: Take 2 tablets (2 g total) by mouth 2 (two) times a day with meals   Patient not taking: Reported on 12/11/2024   tamsulosin (FLOMAX) 0.4 mg 12/11/2024  No Yes   Sig: TAKE 1 CAPSULE DAILY      Facility-Administered Medications: None     Patient has no known allergies.    Objective :  Temp:  [97.5 °F (36.4 °C)-99.1 °F (37.3 °C)] 98 °F (36.7 °C)  HR:  [] 68  BP: ()/(58-85) 117/67  Resp:  [14-20] 16  SpO2:  [94 %-97 %] 94 %  O2 Device: None (Room  air)  Orthostatic Blood Pressures      Flowsheet Row Most Recent Value   Blood Pressure 117/67 filed at 12/12/2024 0805   Patient Position - Orthostatic VS Lying filed at 12/11/2024 1944          First Weight: Weight - Scale: 83.3 kg (183 lb 11.2 oz) (12/11/24 1944)  Vitals:    12/11/24 1944 12/12/24 0546   Weight: 83.3 kg (183 lb 11.2 oz) 83.9 kg (185 lb)       Physical Exam  Vitals and nursing note reviewed.   Constitutional:       Appearance: Normal appearance. He is normal weight. He is ill-appearing (Chronically).   HENT:      Right Ear: External ear normal.      Left Ear: External ear normal.      Nose: Nose normal.   Eyes:      General:         Right eye: No discharge.         Left eye: No discharge.   Cardiovascular:      Rate and Rhythm: Normal rate. Rhythm irregular.      Pulses: Normal pulses.   Pulmonary:      Effort: Pulmonary effort is normal. No accessory muscle usage or respiratory distress.      Breath sounds: Normal breath sounds.      Comments: Course breath sounds  Abdominal:      General: Bowel sounds are normal. There is no distension.      Palpations: Abdomen is soft.   Musculoskeletal:      Right lower leg: No edema.      Left lower leg: No edema.   Skin:     General: Skin is warm and dry.      Capillary Refill: Capillary refill takes less than 2 seconds.   Neurological:      General: No focal deficit present.      Mental Status: He is alert.   Psychiatric:         Mood and Affect: Mood normal.           Lab Results: I have reviewed the following results:  Results from last 7 days   Lab Units 12/12/24  0519 12/11/24  1504 12/09/24  0433   WBC Thousand/uL 6.25 8.62 6.28   HEMOGLOBIN g/dL 10.2* 10.4* 10.9*   HEMATOCRIT % 31.3* 32.1* 34.2*   PLATELETS Thousands/uL 122* 123* 116*     Results from last 7 days   Lab Units 12/12/24  0519 12/11/24  1504 12/09/24  0433   POTASSIUM mmol/L 3.9 4.2 4.0   CHLORIDE mmol/L 99 100 98   CO2 mmol/L 24 26 31   BUN mg/dL 22 16 15   CREATININE mg/dL 1.28 0.95  1.04   CALCIUM mg/dL 8.1* 8.9 7.9*     Results from last 7 days   Lab Units 12/07/24  1309   INR  1.07   PTT seconds 29     Lab Results   Component Value Date    HGBA1C 5.2 11/14/2024     Lab Results   Component Value Date    CKTOTAL 219 12/11/2024    CKMB 2.0 04/22/2021    CKMBINDEX <1.0 04/22/2021    TROPONINI <0.02 06/25/2021       12 lead EKG demonstrates atrial fibrillation rate in the 90s, with nonspecific T wave and ST abnormalities, but unchanged from previous EKG    VTE Prophylaxis: VTE covered by:  apixaban, Oral, 5 mg at 12/11/24 1909    and Sequential compression device (Venodyne)     Abigail TRAN  Cardiology

## 2024-12-12 NOTE — ASSESSMENT & PLAN NOTE
Wt Readings from Last 3 Encounters:   12/12/24 83.9 kg (185 lb)   12/09/24 84 kg (185 lb 3 oz)   11/28/24 83 kg (182 lb 15.7 oz)   Chronic,   Appears euvolemic on admission, no bilateral leg edema or weight gain since last admission.    BNP-747  Echo (8/2024) -EF 65%, systolic function is normal.  Left atrium is severely dilated.    1 dose of Lasix 20 mg IV was given, patient responded well.    Follow-up with PCP outpatient

## 2024-12-12 NOTE — ASSESSMENT & PLAN NOTE
Lab Results   Component Value Date    EGFR 85 12/11/2024    EGFR 76 12/09/2024    EGFR 92 12/08/2024    CREATININE 0.95 12/11/2024    CREATININE 1.04 12/09/2024    CREATININE 0.88 12/08/2024     Baseline Cr: 0.95-1.2  Avoid nephrotoxic medications  Monitor Urine output

## 2024-12-12 NOTE — ASSESSMENT & PLAN NOTE
Lab Results   Component Value Date    EGFR 59 12/12/2024    EGFR 85 12/11/2024    EGFR 76 12/09/2024    CREATININE 1.28 12/12/2024    CREATININE 0.95 12/11/2024    CREATININE 1.04 12/09/2024     Baseline Cr: 0.95-1.2  Avoid nephrotoxic medications  Monitor Urine output

## 2024-12-12 NOTE — PROGRESS NOTES
Progress Note - Family Medicine   Name: Gregg Partida 64 y.o. male I MRN: [id number]  Unit/Bed#: 4 [address] I Date of Admission: 12/11/2024   Date of Service: 12/12/2024 I Hospital Day: 0     Assessment & Plan  Elevated troponin  Pt was found to have elevated troponin in the ED.   HS (0) 512; HS (2) 529  ; HS (4) 539     Pt denies any chest pain or chest tightness has SOB at baseline. Most likely a non-ischemic troponin elevation. Pt did have a fall yesterday where he injured his head and back.     24hr telemonitoring ordered  Cardiology consulted  Monitor for any new onset of chest pain  Follow up from Creatinine Kinase levels  Alcohol abuse  Per patient he had his last drink of vodka was 2 days ago.   Etoh level <10    Ciwa protocol   Thiamine 100mg daily  Folic acid 1000mg QD  Monitor vitals signs    Chronic heart failure with preserved ejection fraction (HCC)  Wt Readings from Last 3 Encounters:   12/12/24 83.9 kg (185 lb)   12/09/24 84 kg (185 lb 3 oz)   11/28/24 83 kg (182 lb 15.7 oz)   Chronic,   Appears euvolemic on admission, no bilateral leg edema or weight gain since last admission.    BNP-747  Echo (8/2024) -EF 65%, systolic function is normal.  Left atrium is severely dilated.    Will try a dose of lasix 20mg  Monitor daily standing weight  Monitor I and O's    Paroxysmal atrial fibrillation (HCC)  Chronic, stable    EKG: A.fib w/o RVR, Twave inversion    Continue Ranexa 12  mg BID  Continue Lopressor 200mg Q12  24hr Tele monitoring  Cardizem 60mg Q6H  Continue Eliquis 5mg BID  Monitor Heartrate  Ambulatory dysfunction  Pt has increased weakness since he left AMA on 12/6. Since his fall yesterday he has difficulty walking and feels very weak. According to patient, he wants to get therapy to feel strong on his feet.     PT/OT referral ordered  Fall precautions  COPD, severity to be determined (HCC)  Chronic, stable  Pt reports feeling well and denies chest pain, chest tightness or cough. Has  SOB at baseline  Continue home inhalers Breo-ellipta 200-25 Mcg    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 disease (HCC)  Lab Results   Component Value Date    HGBA1C 5.2 11/14/2024       Recent Labs     12/09/24  1059 12/11/24  2126 12/12/24  0709   POCGLU 126 105 112       Blood Sugar Average: Last 72 hrs:  (P) 108.5  Held home metformin 500mg  ISS  Accuchecks ACHS  Carb control diabetic diet  Fall, initial encounter  Pt states that he fell backwards on this back from the walker yesterday afternoon. Pt was taken to the [facility], and had a head laceration repair. CT head, X_ray trauma spine and chest x-rays were normal. On initial assessment, Pt still c/o of 7/10 low midline back pain, which is worse with movement and tender to palpation.     Pain regimen: Tylenol 650 Q6 PRN, IV Tylenol 1g Q8h for moderate pain, Oxycodone 2.5mg Q4 PRN for severe pain  Lidocaine patch for the back  Heat applied to the area  Hx of CABG  Chronic,  Triple bypass surgery in 2004. Pt denies an chest pain    Noncompliance  Hx of noncompliance with home medications. Also left AMA during last admission on 12/7 from the ICU  Stage 3a chronic kidney disease (HCC)  Lab Results   Component Value Date    EGFR 59 12/12/2024    EGFR 85 12/11/2024    EGFR 76 12/09/2024    CREATININE 1.28 12/12/2024    CREATININE 0.95 12/11/2024    CREATININE 1.04 12/09/2024     Baseline Cr: 0.95-1.2  Avoid nephrotoxic medications  Monitor Urine output  GERD (gastroesophageal reflux disease)  Continue protonix 40 mg BID  Hypertension  Chronic, stable  Continue Lopressor 200mg BID and cardizem 60mg Q6H, Metoprolol 100mg BID and amlodipine 5mg QD  BPH (benign prostatic hyperplasia)  Chronic, stable  Continue tamsulosin 0.4mg QD    Smoking  Pt states that smokes 1.5 pk  Nicotine patch 21mg     VTE Pharmacologic Prophylaxis: VTE Score: 4 Moderate Risk (Score 3-4) - Pharmacological DVT Prophylaxis Ordered: Patient already anticoagulated with  Eliquis.    Mobility:      Not assessed    Patient Centered Rounds: I performed bedside rounds with nursing staff today.   Discussions with Specialists or Other Care Team Provider: Cardiology    Current Length of Stay: 0 day(s)  Current Patient Status: Observation   Certification Statement: The patient will continue to require additional inpatient hospital stay due to elevated troponins  Discharge Plan: Anticipate discharge in 24-48 hrs to home.    Code Status: Level 1 - Full Code    Subjective   Patient seen at bedside today during rounds.  Reports he is feeling okay.  He does endorse mild neck pain.  He denies chest pain, shortness of breath, chest tightness, nausea/vomiting, palpitations.    Objective :  Temp:  [97.5 °F (36.4 °C)-99.1 °F (37.3 °C)] 98 °F (36.7 °C)  HR:  [] 68  BP: ()/(58-85) 117/67  Resp:  [14-20] 16  SpO2:  [94 %-97 %] 94 %  O2 Device: None (Room air)    Body mass index is 23.75 kg/m².     Input and Output Summary (last 24 hours):     Intake/Output Summary (Last 24 hours) at 12/12/2024 0825  Last data filed at 12/11/2024 2223  Gross per 24 hour   Intake --   Output 250 ml   Net -250 ml       Physical Exam  Vitals and nursing note reviewed.   Constitutional:       General: He is not in acute distress.     Appearance: He is well-developed.   HENT:      Head: Normocephalic and atraumatic.      Mouth/Throat:      Mouth: Mucous membranes are dry.      Pharynx: Oropharynx is clear.   Eyes:      Extraocular Movements: Extraocular movements intact.      Conjunctiva/sclera: Conjunctivae normal.   Cardiovascular:      Rate and Rhythm: Normal rate. Rhythm irregular.      Heart sounds: No murmur heard.  Pulmonary:      Effort: Pulmonary effort is normal. No respiratory distress.      Breath sounds: Normal breath sounds.   Abdominal:      General: Abdomen is flat. There is no distension.      Palpations: Abdomen is soft.      Tenderness: There is no abdominal tenderness.   Musculoskeletal:          General: Tenderness (midline low back) present. No swelling.      Cervical back: Neck supple.      Right lower leg: No edema.      Left lower leg: No edema.   Skin:     General: Skin is warm and dry.      Capillary Refill: Capillary refill takes less than 2 seconds.      Findings: Lesion (repaired laceration) present.   Neurological:      Mental Status: He is alert and oriented to person, place, and time.   Psychiatric:         Mood and Affect: Mood normal.         Lines/Drains:        Telemetry:  Telemetry Orders (From admission, onward)               24 Hour Telemetry Monitoring  Continuous x 24 Hours (Telem)        Question:  Reason for 24 Hour Telemetry  Answer:  Alcohol withdrawal and CIWA >7, electrolyte abnormalities, abnormal ECG and/or heart disease                     Telemetry Reviewed: Atrial fibrillation. HR averaging 60-80  Indication for Continued Telemetry Use: Arrthymias requiring medical therapy               Lab Results: I have reviewed the following results:   Results from last 7 days   Lab Units 12/12/24  0519   WBC Thousand/uL 6.25   HEMOGLOBIN g/dL 10.2*   HEMATOCRIT % 31.3*   PLATELETS Thousands/uL 122*   SEGS PCT % 52   LYMPHO PCT % 26   MONO PCT % 14*   EOS PCT % 6     Results from last 7 days   Lab Units 12/12/24  0519   SODIUM mmol/L 134*   POTASSIUM mmol/L 3.9   CHLORIDE mmol/L 99   CO2 mmol/L 24   BUN mg/dL 22   CREATININE mg/dL 1.28   ANION GAP mmol/L 11   CALCIUM mg/dL 8.1*   ALBUMIN g/dL 3.2*   TOTAL BILIRUBIN mg/dL 0.65   ALK PHOS U/L 89   ALT U/L 14   AST U/L 21   GLUCOSE RANDOM mg/dL 85     Results from last 7 days   Lab Units 12/07/24  1309   INR  1.07     Results from last 7 days   Lab Units 12/12/24  0709 12/11/24  2126 12/09/24  1059 12/09/24  0719 12/08/24  2107 12/08/24  1558 12/08/24  1112 12/08/24  0714 12/07/24  2132   POC GLUCOSE mg/dl 112 105 126 140 157* 129 161* 116 108               Recent Cultures (last 7 days):               Last 24 Hours Medication List:      Current Facility-Administered Medications:     acetaminophen (Ofirmev) injection 1,000 mg, Q8H PRN    acetaminophen (TYLENOL) tablet 650 mg, Q6H PRN    apixaban (ELIQUIS) tablet 5 mg, BID    aspirin chewable tablet 81 mg, Daily    diltiazem (CARDIZEM) tablet 60 mg, Q6H SEDA    ferrous sulfate tablet 325 mg, Daily With Breakfast    fluticasone-vilanterol 200-25 mcg/actuation 1 puff, Daily    folic acid (FOLVITE) tablet 1,000 mcg, Daily    gabapentin (NEURONTIN) capsule 300 mg, TID    guaiFENesin (MUCINEX) 12 hr tablet 600 mg, Q12H SEDA    insulin lispro (HumALOG/ADMELOG) 100 units/mL subcutaneous injection 1-6 Units, TID AC **AND** Fingerstick Glucose (POCT), 4x Daily AC and at bedtime    lidocaine (LIDODERM) 5 % patch 1 patch, Daily    magnesium Oxide (MAG-OX) tablet 800 mg, BID    magnesium sulfate 2 g/50 mL IVPB (premix) 2 g, Once    metoprolol tartrate (LOPRESSOR) tablet 200 mg, Q12H SEDA    nicotine (NICODERM CQ) 21 mg/24 hr TD 24 hr patch 1 patch, Daily    oxyCODONE (ROXICODONE) split tablet 2.5 mg, Q4H PRN    pantoprazole (PROTONIX) EC tablet 40 mg, BID AC    ranolazine (RANEXA) 12 hr tablet 500 mg, Q12H SEDA    sodium chloride tablet 2 g, BID With Meals    tamsulosin (FLOMAX) capsule 0.4 mg, Daily    thiamine tablet 100 mg, Daily    Administrative Statements   Today, Patient Was Seen By: Patience Renee MD      **Please Note: This note may have been constructed using a voice recognition system.**

## 2024-12-12 NOTE — ASSESSMENT & PLAN NOTE
Per patient he had his last drink of vodka was 2 days ago.   Etoh level <10    Due to patient's recent alcohol use, CIWA protocol was activated on admission.  Patient's clinical presentation and CIWA scores were stable throughout hospital course.    Follow-up with PCP outpatient

## 2024-12-12 NOTE — ASSESSMENT & PLAN NOTE
Pt states that he fell backwards on this back from the walker yesterday afternoon. Pt was taken to the [facility], and had a head laceration repair. CT head, X_ray trauma spine and chest x-rays were normal. On initial assessment, Pt still c/o of 7/10 low midline back pain, which is worse with movement and tender to palpation.     Pain regimen: Tylenol 650 Q6 PRN, IV Tylenol 1g Q8h for moderate pain, Oxycodone 2.5mg Q4 PRN for severe pain  Lidocaine patch for the back  Heat applied to the area

## 2024-12-12 NOTE — PHYSICAL THERAPY NOTE
PT EVALUATION       12/12/24 1013   PT Last Visit   PT Visit Date 12/12/24   Note Type   Note type Evaluation   Pain Assessment   Pain Assessment Tool 0-10   Pain Score No Pain   Patient's Stated Pain Goal No pain   Multiple Pain Sites No   Restrictions/Precautions   Weight Bearing Precautions Per Order No   Other Precautions Fall Risk;Pain   Home Living   Type of Home Apartment   Home Layout One level;Performs ADLs on one level;Able to live on main level with bedroom/bathroom;Elevator  (elevator to 2nd floor apartment)   Bathroom Toilet Standard   Home Equipment Walker;Other (Comment)  (+ rollator)   Prior Function   Level of [place] Independent with ADLs;Independent with functional mobility;Needs assistance with IADLS  (Does not drive ; otherwise uses public transportation for IADLs)   Lives With Alone   Receives Help From Family;Neighbor   IADLs Independent with medication management;Family/Friend/Other provides transportation   Falls in the last 6 months 0   General   Additional Pertinent History Pt is a 64 year-old male who was admitted to the hospital on 12/11/24 due to waekness, chronic alcohol use.   Family/Caregiver Present No   Cognition   Arousal/Participation Cooperative   Orientation Level Oriented X4   Following Commands Follows multistep commands with increased time or repetition   Subjective   Subjective Pt agreeable to PT session this AM   RLE Assessment   RLE Assessment   (Hip/knee 3+ to 4-/5 ; Ankle 2-/5 chronic R foot drop per pt (does not use AFO))   LLE Assessment   LLE Assessment   (Grossly 3+ to 4-/5)   Bed Mobility   Supine to Sit 6  Modified independent   Additional items Bedrails   Sit to Supine 6  Modified independent   Additional items Bedrails   Additional Comments Pt able to sit at EOB + able to IND rosanna socks/underwear with increased time   Transfers   Sit to Stand 5  Supervision   Stand to Sit 5  Supervision   Ambulation/Elevation   Gait pattern Foward flexed;Step through  pattern   Gait Assistance 5  Supervision   Additional items Verbal cues   Assistive Device Rolling walker   Distance 200 feet with multiple changes of direction   Stair Management Assistance Not tested  (pt reports he does not negotiate steps ; uses elevator)   Balance   Static Sitting Good   Dynamic Sitting Fair +   Static Standing Fair +   Dynamic Standing Fair   Ambulatory Fair   Activity Tolerance   Activity Tolerance Patient tolerated treatment well   Nurse Made Aware yes   Assessment   Prognosis Good   Problem List Decreased endurance;Impaired balance;Decreased mobility;Decreased safety awareness;Pain;Decreased strength   Assessment Patient seen for Physical Therapy evaluation. Patient admitted with Elevated troponin.  Comorbidities affecting patient's physical performance include: Afib, CABG, CAD, cardiac disease, CKD, COPD, depression, and GERD.  Personal factors affecting patient at time of initial evaluation include: lives in 1 story house and inability to navigate level surfaces without external assistance. Prior to admission, patient was independent with functional mobility with walker/rollator, independent with ADLS, requiring assist for IADLS, living alone in 1 story home with 0 steps to enter, and ambulating household distance.  Please find objective findings from Physical Therapy assessment regarding body systems outlined above with impairments and limitations including weakness, impaired balance, decreased endurance, gait deviations, decreased activity tolerance, decreased functional mobility tolerance, and fall risk.  The Barthel Index was used as a functional outcome tool presenting with a score of Barthel Index Score: 80 today indicating minimal limitations of functional mobility and ADLS.  Patient's clinical presentation is currently evolving as seen in patient's presentation of increased fall risk and decreased endurance. Pt would benefit from continued Physical Therapy treatment to address  deficits as defined above and maximize level of functional mobility. As demonstrated by objective findings, the assigned level of complexity for this evaluation is moderate.The patient's AM-PAC Basic Mobility Inpatient Short Form Raw Score is 20. A Raw score of greater than 16 suggests the patient may benefit from discharge to home. Please also refer to the recommendation of the Physical Therapist for safe discharge planning.   Goals   Patient Goals to go home   STG Expiration Date 12/19/24   Short Term Goal #1 Pt will perform bed mobility/trasnfers IND ; Standing balance will improve to fair+ to decrease risk of falls ; BLE strength will improve by 1/2 grade to improve tolerance to functional mobility ; Pt will ambulate x 500 feet Mod I with RW ; AMPAC score will improve >22/24 to demonstrate improved functional independence   LTG Expiration Date 12/26/24   Long Term Goal #1 Standing balance will improve to good to decrease risk of falls ; BLE strength will improve by 1 grade to improve tolerance to functional mobility ; Pt will ambulate x 1000 feet Mod I with RW   Plan   Treatment/Interventions Functional transfer training;LE strengthening/ROM;Therapeutic exercise;Endurance training;Gait training;Spoke to nursing;Spoke to case management;Spoke to advanced practitioner   PT Frequency 2-3x/wk   Discharge Recommendation   Rehab Resource Intensity Level, PT III (Minimum Resource Intensity)   AM-PAC Basic Mobility Inpatient   Turning in Flat Bed Without Bedrails 4   Lying on Back to Sitting on Edge of Flat Bed Without Bedrails 4   Moving Bed to Chair 3   Standing Up From Chair Using Arms 3   Walk in Room 3   Climb 3-5 Stairs With Railing 3   Basic Mobility Inpatient Raw Score 20   Basic Mobility Standardized Score 43.99   [facility] Highest Level Of Mobility   -HLM Goal 6: Walk 10 steps or more   -HLM Achieved 7: Walk 25 feet or more   Barthel Index   Feeding 10   Bathing 5   Grooming Score 5   Dressing Score 10    Bladder Score 10   Bowels Score 10   Toilet Use Score 10   Transfers (Bed/Chair) Score 10   Mobility (Level Surface) Score 10   Stairs Score 0   Barthel Index Score 80   End of Consult   Patient Position at End of Consult Supine;All needs within reach  (As found at beginning of session)   Licensure   [place] License Number  Ursula Anne [id number]

## 2024-12-12 NOTE — PLAN OF CARE
Problem: PAIN - ADULT  Goal: Verbalizes/displays adequate comfort level or baseline comfort level  Description: Interventions:  - Encourage patient to monitor pain and request assistance  - Assess pain using appropriate pain scale  - Administer analgesics based on type and severity of pain and evaluate response  - Implement non-pharmacological measures as appropriate and evaluate response  - Consider cultural and social influences on pain and pain management  - Notify physician/advanced practitioner if interventions unsuccessful or patient reports new pain  Outcome: Progressing     Problem: SAFETY ADULT  Goal: Patient will remain free of falls  Description: INTERVENTIONS:  - Educate patient/family on patient safety including physical limitations  - Instruct patient to call for assistance with activity   - Consult OT/PT to assist with strengthening/mobility   - Keep Call bell within reach  - Keep bed low and locked with side rails adjusted as appropriate  - Keep care items and personal belongings within reach  - Initiate and maintain comfort rounds  - Make Fall Risk Sign visible to staff  - Offer Toileting every 2 Hours, in advance of need  - Initiate/Maintain bed alarm  - Obtain necessary fall risk management equipment: call bell  Problem: MUSCULOSKELETAL - ADULT  Goal: Maintain or return mobility to safest level of function  Description: INTERVENTIONS:  - Assess patient's ability to carry out ADLs; assess patient's baseline for ADL function and identify physical deficits which impact ability to perform ADLs (bathing, care of mouth/teeth, toileting, grooming, dressing, etc.)  - Assess/evaluate cause of self-care deficits   - Assess range of motion  - Assess patient's mobility  - Assess patient's need for assistive devices and provide as appropriate  - Encourage maximum independence but intervene and supervise when necessary  - Involve family in performance of ADLs  - Assess for home care needs following discharge    - Consider OT consult to assist with ADL evaluation and planning for discharge  - Provide patient education as appropriate  Outcome: Progressing     Problem: RESPIRATORY - ADULT  Goal: Achieves optimal ventilation and oxygenation  Description: INTERVENTIONS:  - Assess for changes in respiratory status  - Assess for changes in mentation and behavior  - Position to facilitate oxygenation and minimize respiratory effort  - Oxygen administered by appropriate delivery if ordered  - Initiate smoking cessation education as indicated  - Encourage broncho-pulmonary hygiene including cough, deep breathe, Incentive Spirometry  - Assess the need for suctioning and aspirate as needed  - Assess and instruct to report SOB or any respiratory difficulty  - Respiratory Therapy support as indicated  Outcome: Progressing     Problem: METABOLIC, FLUID AND ELECTROLYTES - ADULT  Goal: Electrolytes maintained within normal limits  Description: INTERVENTIONS:  - Monitor labs and assess patient for signs and symptoms of electrolyte imbalances  - Administer electrolyte replacement as ordered  - Monitor response to electrolyte replacements, including repeat lab results as appropriate  - Instruct patient on fluid and nutrition as appropriate  Outcome: Progressing  Goal: Fluid balance maintained  Description: INTERVENTIONS:  - Monitor labs   - Monitor I/O and WT  - Instruct patient on fluid and nutrition as appropriate  - Assess for signs & symptoms of volume excess or deficit  Outcome: Progressing  Goal: Glucose maintained within target range  Description: INTERVENTIONS:  - Monitor Blood Glucose as ordered  - Assess for signs and symptoms of hyperglycemia and hypoglycemia  - Administer ordered medications to maintain glucose within target range  - Assess nutritional intake and initiate nutrition service referral as needed  Outcome: Progressing     - Apply yellow socks and bracelet for high fall risk patients  - Consider moving patient to room  near nurses station  Outcome: Progressing  Goal: Maintain or return to baseline ADL function  Description: INTERVENTIONS:  -  Assess patient's ability to carry out ADLs; assess patient's baseline for ADL function and identify physical deficits which impact ability to perform ADLs (bathing, care of mouth/teeth, toileting, grooming, dressing, etc.)  - Assess/evaluate cause of self-care deficits   - Assess range of motion  - Assess patient's mobility; develop plan if impaired  - Assess patient's need for assistive devices and provide as appropriate  - Encourage maximum independence but intervene and supervise when necessary  - Involve family in performance of ADLs  - Assess for home care needs following discharge   - Consider OT consult to assist with ADL evaluation and planning for discharge  - Provide patient education as appropriate  Outcome: Progressing

## 2024-12-12 NOTE — DISCHARGE SUMMARY
Discharge Summary - Family Medicine   Name: Gregg Partida 64 y.o. male I MRN: [id number]  Unit/Bed#: 4 [place] 402-01 I Date of Admission: 12/11/2024   Date of Service: 12/12/2024 I Hospital Day: 0    Medical Problems       Resolved Problems  Date Reviewed: 12/9/2024          Resolved    * (Principal) Elevated troponin 12/12/2024     Resolved by  Patience Renee MD    Fall, initial encounter 12/12/2024     Resolved by  Patience Renee MD        Attending Physician: Brandy Connors DO  Discharging Physician / Practitioner: Patience Renee MD  PCP: Lilliana Bliss MD  Admission Date:   Admission Orders (From admission, onward)       Ordered        12/11/24 1646  Place in Observation  Once                          Discharge Date: 12/12/24    Consultations During Hospital Stay:  Cardiology    Procedures Performed:   None    Significant Findings / Test Results:   12/12: Na 134, K 3.9, Mg 1.8, WBC 6.25, Hgb 10.2, random troponin 443,   12/11: WBC 8.62, EtOH less than 10, , trops(0) 512;(2) 529,(4) 536,     Chest x-ray: Mild pulmonary vascular congestion  CT lumbar spine wo contrast: No acute osseous abnormality, degenerative changes noted  CT head: No acute intracranial abnormality.    Incidental Findings:   None     Test Results Pending at Discharge (will require follow up):   None     Outpatient Tests Requested:  None    Complications:    None    Reason for Admission:   Elevated troponin levels    Things to discuss at next visit:  Have you been compliant with your medications?  Have you picked up/started taking your new Cardizem SR 120mg BID?  Have you scheduled a follow-up appointment with Cardiology?  How has your weakness been since you left the hospital?  Have you fell anymore times?  Have you scheduled appointments with PT/OT?  -Discontinued Amlodipine 5mg in setting of newly increased Cardizem SR 120mg BID    Hospital Course:   Hospital Course: Patient is a 64-year-old male w/ PMH of A.fib, alcohol  dependence, CHF, COPD, T2DM w/ stage 3a CKD, history of CABG, GERD, HTN, BPH, nicotine dependence who presented to the hospital on 12/11/2024 complaining of weakness after a fall. Prior to presentation, patient experienced a ground-level fall while using his walker. He sustained a laceration to the head, which was successfully repaired at [facility]. He presented to [place] ED with progressive weakness. ED labs were notable for an elevated troponin of 512. Patient did not have chest pain at that time. He was admitted for observation. Because of his A.fib history, he was placed on telemetry. He was in A.fib without RVR and was rate controlled. Cardiology was consulted, and determined lab abnormalities were likely a non-ischemic troponin elevation. This could be likely secondary from his fall, A.fib, or recent alcohol use prior to presentation. No interventions were recommended. Cardiology adjusted his A.fib medication to Cardizem SR 120mg BID. Patient was stable throughout the remainder of his hospital course. He worked with PT and determined he was at his baseline mobility status. He was assessed and determined to be stable for discharge on 12/12/2024, with instruction for new medication adherence and close follow-up with PCP, outpatient PT, and Cardiology.       * Elevated troponin-resolved as of 12/12/2024  Assessment & Plan  Pt was found to have elevated troponin in the ED.   HS (0) 512; HS (2) 529  ; HS (4) 539   Pt denies any chest pain or chest tightness has SOB at baseline. Most likely a non-ischemic troponin elevation. Pt did have a fall yesterday where he injured his head and back.     Chest x-ray: Mild pulmonary vascular congestion  CT lumbar spine wo contrast: No acute osseous abnormality, degenerative changes noted  CT head: No acute intracranial abnormality.    Random troponin level the morning after admission was 443, indicating that troponin levels peaked. Cardiology was consulted, and  determined lab abnormalities were likely a non-ischemic troponin elevation. This could be likely secondary from his fall, A.fib, or recent alcohol use prior to presentation. No interventions were recommended.  Patient remained stable without chest pain throughout the remainder of his hospital course.       Follow-up with PCP and cardiology    Fall, initial encounter-resolved as of 12/12/2024  Assessment & Plan  Pt states that he fell backwards on this back from the walker yesterday afternoon. Pt was taken to the [facility], and had a head laceration repair. CT head, X_ray trauma spine and chest x-rays were normal. On initial assessment, Pt still c/o of 7/10 low midline back pain, which is worse with movement and tender to palpation.     Pain regimen: Tylenol 650 Q6 PRN, IV Tylenol 1g Q8h for moderate pain, Oxycodone 2.5mg Q4 PRN for severe pain.  Lidocaine patch and heat for her back.  Patient's pain was well-controlled with this regimen.    Follow-up with PCP  Referral sent for outpatient PT/OT    Smoking  Assessment & Plan  Pt states that smokes 1.5 pk  Nicotine patch 21mg   Follow-up with PCP    BPH (benign prostatic hyperplasia)  Assessment & Plan  Chronic, stable  Continue tamsulosin 0.4mg QD  Follow-up with PCP      Ambulatory dysfunction  Assessment & Plan  Pt has increased weakness since he left AMA on 12/6. Since his fall yesterday he has difficulty walking and feels very weak. According to patient, he wants to get therapy to feel strong on his feet.     PT assessed in hospital, AM-[facility] Basic Mobility Inpatient Short Form Raw Score is 20. A Raw score of greater than 16 suggests the patient may benefit from discharge to home.     PT/OT referral ordered  Follow-up with PCP    Hypertension  Assessment & Plan  Chronic, stable  Continue Lopressor 200mg BID, Metoprolol 100mg BID  Per cardiology, initiated Cardizem  mg twice daily  Discontinued amlodipine 5 mg in the setting of new Cardizem dosage  change  Follow-up with PCP and cardiology    GERD (gastroesophageal reflux disease)  Assessment & Plan  Continue protonix 40 mg BID  Follow-up with PCP    Longstanding persistent atrial fibrillation (HCC)  Assessment & Plan  Chronic, stable    EKG: A.fib w/o RVR, Twave inversion    Patient's primary problem of elevated troponin suspicious for being secondary to persistent A-fib.  Throughout hospital course, patient's telemetry remained stable.  Cardiology was consulted, and recommended medication change from Cardizem.     Per cardiology, patient transition to Cardizem  mg twice daily  Continue Ranexa 12  mg BID  Continue Lopressor 200mg Q12  Continue Eliquis 5mg BID  Continue follow-up with PCP and cardiology    Stage 3a chronic kidney disease (HCC)  Assessment & Plan  Lab Results   Component Value Date    EGFR 59 12/12/2024    EGFR 85 12/11/2024    EGFR 76 12/09/2024    CREATININE 1.28 12/12/2024    CREATININE 0.95 12/11/2024    CREATININE 1.04 12/09/2024     Baseline Cr: 0.95-1.2    Follow-up with PCP outpatient    Noncompliance  Assessment & Plan  Hx of noncompliance with home medications. Also left AMA during last admission on 12/7 from the ICU    Chronic heart failure with preserved ejection fraction (HCC)  Assessment & Plan  Wt Readings from Last 3 Encounters:   12/12/24 83.9 kg (185 lb)   12/09/24 84 kg (185 lb 3 oz)   11/28/24 83 kg (182 lb 15.7 oz)   Chronic,   Appears euvolemic on admission, no bilateral leg edema or weight gain since last admission.    BNP-747  Echo (8/2024) -EF 65%, systolic function is normal.  Left atrium is severely dilated.    1 dose of Lasix 20 mg IV was given, patient responded well.    Follow-up with PCP outpatient    Alcohol abuse  Assessment & Plan  Per patient he had his last drink of vodka was 2 days ago.   Etoh level <10    Due to patient's recent alcohol use, CIWA protocol was activated on admission.  Patient's clinical presentation and CIWA scores were stable  "throughout hospital course.    Follow-up with PCP outpatient      Hx of CABG  Assessment & Plan  Chronic,  Triple bypass surgery in 2004. Pt denies any chest pain on initial assessment.   Continue home Lopressor 200mg BID, Ranexa 500mg Q12h    Follow-up with PCP and Cardiology outpatient      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 disease (HCC)  Assessment & Plan  Lab Results   Component Value Date    HGBA1C 5.2 11/14/2024       Recent Labs     12/11/24  2126 12/12/24  0709 12/12/24  1128   POCGLU 105 112 139         Blood Sugar Average: Last 72 hrs:  (P) [id number]    Resume home metformin 500 mg  Follow-up with PCP outpatient    COPD, severity to be determined (HCC)  Assessment & Plan  Chronic, stable  Pt reports feeling well and denies chest pain, chest tightness or cough. Has SOB at baseline    Continue home inhalers Breo-ellipta 200-25 Mcg  Follow-up with PCP outpatient          Please see above list of diagnoses and related plan for additional information.     Condition at Discharge: stable    Discharge Day Visit / Exam:   Subjective:  Patient was seen and assessed at bedside. He reports he is feeling well overall. He reports feeling some weakness, but better than initial presentation. He denies chest pain, chest tightness, shortness of breath, nausea/vomiting, constipation/diarrhea.     Vitals: Blood Pressure: 117/67 (12/12/24 0805)  Pulse: 68 (12/12/24 0805)  Temperature: 98 °F (36.7 °C) (12/12/24 0805)  Temp Source: Oral (12/11/24 2042)  Respirations: 16 (12/12/24 0805)  Height: 6' 2\" (188 cm) (12/11/24 2042)  Weight - Scale: 83.9 kg (185 lb) (12/12/24 0546)  SpO2: 94 % (12/12/24 0805)    Physical Exam  Vitals and nursing note reviewed.   Constitutional:       General: He is not in acute distress.     Appearance: He is well-developed.   HENT:      Head: Normocephalic and atraumatic.      Mouth/Throat:      Mouth: Mucous membranes are dry.      Pharynx: Oropharynx is clear.   Eyes: "      Extraocular Movements: Extraocular movements intact.      Conjunctiva/sclera: Conjunctivae normal.   Cardiovascular:      Rate and Rhythm: Normal rate. Rhythm irregular.      Heart sounds: No murmur heard.  Pulmonary:      Effort: Pulmonary effort is normal. No respiratory distress.      Breath sounds: Normal breath sounds.   Abdominal:      General: Abdomen is flat. There is no distension.      Palpations: Abdomen is soft.      Tenderness: There is no abdominal tenderness.   Musculoskeletal:         General: No swelling or tenderness (mild in lower back).      Cervical back: Neck supple.      Right lower leg: No edema.      Left lower leg: No edema.   Skin:     General: Skin is warm and dry.      Capillary Refill: Capillary refill takes less than 2 seconds.   Neurological:      Mental Status: He is alert and oriented to person, place, and time.   Psychiatric:         Mood and Affect: Mood normal.         Discussion with Family: Patient declined call to .     Discharge instructions/Information to patient and family:   See after visit summary for information provided to patient and family.      Provisions for Follow-Up Care:  See after visit summary for information related to follow-up care and any pertinent home health orders.      Mobility at time of Discharge:   Basic Mobility Inpatient Raw Score: 20  JH-HLM Goal: 6: Walk 10 steps or more  JH-HLM Achieved: 7: Walk 25 feet or more  HLM Goal achieved. Continue to encourage appropriate mobility.     Disposition:   Home    Planned Readmission:   No    Discharge Medications:  See after visit summary for reconciled discharge medications provided to patient and/or family.      Administrative Statements   Discharge Statement:  I have spent a total time of >30 minutes in caring for this patient on the day of the visit/encounter. .    **Please Note: This note may have been constructed using a voice recognition system**

## 2024-12-12 NOTE — ASSESSMENT & PLAN NOTE
Pt has increased weakness since he left AMA on 12/6. Since his fall yesterday he has difficulty walking and feels very weak. According to patient, he wants to get therapy to feel strong on his feet.     PT/OT referral ordered  Fall precautions

## 2024-12-12 NOTE — ASSESSMENT & PLAN NOTE
Wt Readings from Last 3 Encounters:   12/12/24 83.9 kg (185 lb)   12/09/24 84 kg (185 lb 3 oz)   11/28/24 83 kg (182 lb 15.7 oz)   Chronic,   Appears euvolemic on admission, no bilateral leg edema or weight gain since last admission.    BNP-747  Echo (8/2024) -EF 65%, systolic function is normal.  Left atrium is severely dilated.    Will try a dose of lasix 20mg  Monitor daily standing weight  Monitor I and O's

## 2024-12-12 NOTE — ASSESSMENT & PLAN NOTE
Chronic, stable    EKG: A.fib w/o RVR, Twave inversion    Patient's primary problem of elevated troponin suspicious for being secondary to persistent A-fib.  Throughout hospital course, patient's telemetry remained stable.  Cardiology was consulted, and recommended medication change from Cardizem.     Per cardiology, patient transition to Cardizem  mg twice daily  Continue Ranexa 12  mg BID  Continue Lopressor 200mg Q12  Continue Eliquis 5mg BID  Continue follow-up with PCP and cardiology

## 2024-12-12 NOTE — ASSESSMENT & PLAN NOTE
Chronic, stable    EKG: A.fib w/o RVR, Twave inversion    Continue Ranexa 12  mg BID  Continue Lopressor 200mg Q12  24hr Tele monitoring  Cardizem 60mg Q6H  Continue Eliquis 5mg BID  Monitor Heartrate

## 2024-12-12 NOTE — ASSESSMENT & PLAN NOTE
Chronic, stable  Continue Lopressor 200mg BID and cardizem 60mg Q6H, Metoprolol 100mg BID and amlodipine 5mg QD

## 2024-12-12 NOTE — NURSING NOTE
Pt discharged home. All education completed and verbal understanding given by pt. All pt belongings given to pt.

## 2024-12-12 NOTE — ASSESSMENT & PLAN NOTE
Lab Results   Component Value Date    EGFR 59 12/12/2024    EGFR 85 12/11/2024    EGFR 76 12/09/2024    CREATININE 1.28 12/12/2024    CREATININE 0.95 12/11/2024    CREATININE 1.04 12/09/2024     Baseline Cr: 0.95-1.2    Follow-up with PCP outpatient

## 2024-12-12 NOTE — ASSESSMENT & PLAN NOTE
high-sensitivity troponins: 512 (0hr), 529 (2hr), 536 (4hr)  random high-sensitivity troponin is 443  non-MI troponin elevation secondary to rapid atrial fibrillation, chronic kidney disease, frequent falls and alcohol abuse  continue beta-blocker

## 2024-12-12 NOTE — ASSESSMENT & PLAN NOTE
Lab Results   Component Value Date    EGFR 59 12/12/2024    EGFR 85 12/11/2024    EGFR 76 12/09/2024    CREATININE 1.28 12/12/2024    CREATININE 0.95 12/11/2024    CREATININE 1.04 12/09/2024   appears to be at baseline

## 2024-12-12 NOTE — PLAN OF CARE
Problem: PHYSICAL THERAPY ADULT  Goal: Performs mobility at highest level of function for planned discharge setting.  See evaluation for individualized goals.  Description: Treatment/Interventions: Functional transfer training, LE strengthening/ROM, Therapeutic exercise, Endurance training, Gait training, Spoke to nursing, Spoke to case management, Spoke to advanced practitioner          See flowsheet documentation for full assessment, interventions and recommendations.  Note: Prognosis: Good  Problem List: Decreased endurance, Impaired balance, Decreased mobility, Decreased safety awareness, Pain, Decreased strength  Assessment: Patient seen for Physical Therapy evaluation. Patient admitted with Elevated troponin.  Comorbidities affecting patient's physical performance include: Afib, CABG, CAD, cardiac disease, CKD, COPD, depression, and GERD.  Personal factors affecting patient at time of initial evaluation include: lives in 1 story house and inability to navigate level surfaces without external assistance. Prior to admission, patient was independent with functional mobility with walker/rollator, independent with ADLS, requiring assist for IADLS, living alone in 1 story home with 0 steps to enter, and ambulating household distance.  Please find objective findings from Physical Therapy assessment regarding body systems outlined above with impairments and limitations including weakness, impaired balance, decreased endurance, gait deviations, decreased activity tolerance, decreased functional mobility tolerance, and fall risk.  The Barthel Index was used as a functional outcome tool presenting with a score of Barthel Index Score: 80 today indicating minimal limitations of functional mobility and ADLS.  Patient's clinical presentation is currently evolving as seen in patient's presentation of increased fall risk and decreased endurance. Pt would benefit from continued Physical Therapy treatment to address deficits  as defined above and maximize level of functional mobility. As demonstrated by objective findings, the assigned level of complexity for this evaluation is moderate.The patient's AM-[facility] Basic Mobility Inpatient Short Form Raw Score is 20. A Raw score of greater than 16 suggests the patient may benefit from discharge to home. Please also refer to the recommendation of the Physical Therapist for safe discharge planning.        Rehab Resource Intensity Level, PT: III (Minimum Resource Intensity)    See flowsheet documentation for full assessment.

## 2024-12-13 ENCOUNTER — TRANSITIONAL CARE MANAGEMENT (OUTPATIENT)
Age: 64
End: 2024-12-13

## 2024-12-17 ENCOUNTER — TELEPHONE (OUTPATIENT)
Dept: GASTROENTEROLOGY | Facility: CLINIC | Age: 64
End: 2024-12-17

## 2024-12-17 NOTE — TELEPHONE ENCOUNTER
Patient is due for a 1 year colonoscopy due to poor prep with Dr. Ferrara. I called patient to see if he wanted to schedule. He has a lot of doctor appointments going on and he will have to call back.

## 2024-12-24 NOTE — TELEPHONE ENCOUNTER
I called pt in September also asking if he wanted to schedule and he stated he would call me back. Sending letter to remind pt to call since he hasn't called to schedule.

## 2025-01-06 ENCOUNTER — APPOINTMENT (EMERGENCY)
Dept: RADIOLOGY | Facility: HOSPITAL | Age: 65
DRG: 683 | End: 2025-01-06
Payer: COMMERCIAL

## 2025-01-06 ENCOUNTER — HOSPITAL ENCOUNTER (INPATIENT)
Facility: HOSPITAL | Age: 65
LOS: 1 days | Discharge: HOME | DRG: 683 | End: 2025-01-08
Attending: EMERGENCY MEDICINE | Admitting: FAMILY MEDICINE
Payer: COMMERCIAL

## 2025-01-06 ENCOUNTER — APPOINTMENT (OUTPATIENT)
Dept: RADIOLOGY | Facility: HOSPITAL | Age: 65
DRG: 683 | End: 2025-01-06
Payer: COMMERCIAL

## 2025-01-06 DIAGNOSIS — R06.09 DYSPNEA ON EXERTION: ICD-10-CM

## 2025-01-06 DIAGNOSIS — N17.9 ACUTE KIDNEY INJURY (HCC): ICD-10-CM

## 2025-01-06 DIAGNOSIS — E86.0 DEHYDRATION: ICD-10-CM

## 2025-01-06 DIAGNOSIS — R53.1 WEAKNESS: Primary | ICD-10-CM

## 2025-01-06 DIAGNOSIS — R55 NEAR SYNCOPE: ICD-10-CM

## 2025-01-06 DIAGNOSIS — I48.91 ATRIAL FIBRILLATION WITH RAPID VENTRICULAR RESPONSE (HCC): ICD-10-CM

## 2025-01-06 DIAGNOSIS — J96.01 ACUTE RESPIRATORY FAILURE WITH HYPOXIA (HCC): ICD-10-CM

## 2025-01-06 LAB
2HR DELTA HS TROPONIN: -7 NG/L
ALBUMIN SERPL BCG-MCNC: 3.9 G/DL (ref 3.5–5)
ALP SERPL-CCNC: 78 U/L (ref 34–104)
ALT SERPL W P-5'-P-CCNC: 15 U/L (ref 7–52)
ANION GAP SERPL CALCULATED.3IONS-SCNC: 9 MMOL/L (ref 4–13)
AST SERPL W P-5'-P-CCNC: 20 U/L (ref 13–39)
BASOPHILS # BLD AUTO: 0.1 THOUSANDS/ΜL (ref 0–0.1)
BASOPHILS NFR BLD AUTO: 1 % (ref 0–1)
BILIRUB SERPL-MCNC: 0.61 MG/DL (ref 0.2–1)
BUN SERPL-MCNC: 28 MG/DL (ref 5–25)
CALCIUM SERPL-MCNC: 9 MG/DL (ref 8.4–10.2)
CARDIAC TROPONIN I PNL SERPL HS: 20 NG/L (ref ?–50)
CARDIAC TROPONIN I PNL SERPL HS: 27 NG/L (ref ?–50)
CHLORIDE SERPL-SCNC: 98 MMOL/L (ref 96–108)
CO2 SERPL-SCNC: 25 MMOL/L (ref 21–32)
CREAT SERPL-MCNC: 1.95 MG/DL (ref 0.6–1.3)
EOSINOPHIL # BLD AUTO: 0.19 THOUSAND/ΜL (ref 0–0.61)
EOSINOPHIL NFR BLD AUTO: 2 % (ref 0–6)
ERYTHROCYTE [DISTWIDTH] IN BLOOD BY AUTOMATED COUNT: 14.6 % (ref 11.6–15.1)
GFR SERPL CREATININE-BSD FRML MDRD: 35 ML/MIN/1.73SQ M
GLUCOSE SERPL-MCNC: 117 MG/DL (ref 65–140)
GLUCOSE SERPL-MCNC: 123 MG/DL (ref 65–140)
GLUCOSE SERPL-MCNC: 134 MG/DL (ref 65–140)
HCT VFR BLD AUTO: 35.9 % (ref 36.5–49.3)
HGB BLD-MCNC: 11.7 G/DL (ref 12–17)
IMM GRANULOCYTES # BLD AUTO: 0.04 THOUSAND/UL (ref 0–0.2)
IMM GRANULOCYTES NFR BLD AUTO: 1 % (ref 0–2)
LIPASE SERPL-CCNC: 103 U/L (ref 11–82)
LYMPHOCYTES # BLD AUTO: 1.79 THOUSANDS/ΜL (ref 0.6–4.47)
LYMPHOCYTES NFR BLD AUTO: 22 % (ref 14–44)
MAGNESIUM SERPL-MCNC: 1.4 MG/DL (ref 1.9–2.7)
MCH RBC QN AUTO: 31.1 PG (ref 26.8–34.3)
MCHC RBC AUTO-ENTMCNC: 32.6 G/DL (ref 31.4–37.4)
MCV RBC AUTO: 96 FL (ref 82–98)
MONOCYTES # BLD AUTO: 0.82 THOUSAND/ΜL (ref 0.17–1.22)
MONOCYTES NFR BLD AUTO: 10 % (ref 4–12)
NEUTROPHILS # BLD AUTO: 5.26 THOUSANDS/ΜL (ref 1.85–7.62)
NEUTS SEG NFR BLD AUTO: 64 % (ref 43–75)
NRBC BLD AUTO-RTO: 0 /100 WBCS
PLATELET # BLD AUTO: 138 THOUSANDS/UL (ref 149–390)
PLATELET # BLD AUTO: 170 THOUSANDS/UL (ref 149–390)
PMV BLD AUTO: 10.7 FL (ref 8.9–12.7)
PMV BLD AUTO: 10.8 FL (ref 8.9–12.7)
POTASSIUM SERPL-SCNC: 4.4 MMOL/L (ref 3.5–5.3)
PROT SERPL-MCNC: 7.2 G/DL (ref 6.4–8.4)
RBC # BLD AUTO: 3.76 MILLION/UL (ref 3.88–5.62)
SODIUM SERPL-SCNC: 132 MMOL/L (ref 135–147)
TSH SERPL DL<=0.05 MIU/L-ACNC: 2.05 UIU/ML (ref 0.45–4.5)
WBC # BLD AUTO: 8.2 THOUSAND/UL (ref 4.31–10.16)

## 2025-01-06 PROCEDURE — 99285 EMERGENCY DEPT VISIT HI MDM: CPT

## 2025-01-06 PROCEDURE — 72125 CT NECK SPINE W/O DYE: CPT

## 2025-01-06 PROCEDURE — 84484 ASSAY OF TROPONIN QUANT: CPT | Performed by: EMERGENCY MEDICINE

## 2025-01-06 PROCEDURE — 71045 X-RAY EXAM CHEST 1 VIEW: CPT

## 2025-01-06 PROCEDURE — 84484 ASSAY OF TROPONIN QUANT: CPT

## 2025-01-06 PROCEDURE — 85025 COMPLETE CBC W/AUTO DIFF WBC: CPT | Performed by: EMERGENCY MEDICINE

## 2025-01-06 PROCEDURE — 83690 ASSAY OF LIPASE: CPT | Performed by: EMERGENCY MEDICINE

## 2025-01-06 PROCEDURE — 93005 ELECTROCARDIOGRAM TRACING: CPT

## 2025-01-06 PROCEDURE — 99285 EMERGENCY DEPT VISIT HI MDM: CPT | Performed by: EMERGENCY MEDICINE

## 2025-01-06 PROCEDURE — 80053 COMPREHEN METABOLIC PANEL: CPT | Performed by: EMERGENCY MEDICINE

## 2025-01-06 PROCEDURE — 82948 REAGENT STRIP/BLOOD GLUCOSE: CPT

## 2025-01-06 PROCEDURE — 96360 HYDRATION IV INFUSION INIT: CPT

## 2025-01-06 PROCEDURE — 83735 ASSAY OF MAGNESIUM: CPT | Performed by: EMERGENCY MEDICINE

## 2025-01-06 PROCEDURE — 85049 AUTOMATED PLATELET COUNT: CPT

## 2025-01-06 PROCEDURE — 99223 1ST HOSP IP/OBS HIGH 75: CPT | Performed by: INTERNAL MEDICINE

## 2025-01-06 PROCEDURE — 84443 ASSAY THYROID STIM HORMONE: CPT | Performed by: EMERGENCY MEDICINE

## 2025-01-06 PROCEDURE — 70450 CT HEAD/BRAIN W/O DYE: CPT

## 2025-01-06 PROCEDURE — 36415 COLL VENOUS BLD VENIPUNCTURE: CPT | Performed by: EMERGENCY MEDICINE

## 2025-01-06 RX ORDER — SODIUM CHLORIDE 9 MG/ML
75 INJECTION, SOLUTION INTRAVENOUS CONTINUOUS
Status: DISCONTINUED | OUTPATIENT
Start: 2025-01-06 | End: 2025-01-07

## 2025-01-06 RX ORDER — RANOLAZINE 500 MG/1
500 TABLET, EXTENDED RELEASE ORAL EVERY 12 HOURS SCHEDULED
Status: DISCONTINUED | OUTPATIENT
Start: 2025-01-07 | End: 2025-01-08 | Stop reason: HOSPADM

## 2025-01-06 RX ORDER — TAMSULOSIN HYDROCHLORIDE 0.4 MG/1
0.4 CAPSULE ORAL
Status: DISCONTINUED | OUTPATIENT
Start: 2025-01-07 | End: 2025-01-08 | Stop reason: HOSPADM

## 2025-01-06 RX ORDER — FLUTICASONE FUROATE AND VILANTEROL 200; 25 UG/1; UG/1
1 POWDER RESPIRATORY (INHALATION) DAILY
Status: DISCONTINUED | OUTPATIENT
Start: 2025-01-06 | End: 2025-01-08 | Stop reason: HOSPADM

## 2025-01-06 RX ORDER — METOPROLOL TARTRATE 100 MG/1
200 TABLET ORAL EVERY 12 HOURS SCHEDULED
Status: DISCONTINUED | OUTPATIENT
Start: 2025-01-07 | End: 2025-01-08 | Stop reason: HOSPADM

## 2025-01-06 RX ORDER — INSULIN LISPRO 100 [IU]/ML
1-6 INJECTION, SOLUTION INTRAVENOUS; SUBCUTANEOUS
Status: DISCONTINUED | OUTPATIENT
Start: 2025-01-06 | End: 2025-01-08 | Stop reason: HOSPADM

## 2025-01-06 RX ORDER — NICOTINE 21 MG/24HR
1 PATCH, TRANSDERMAL 24 HOURS TRANSDERMAL DAILY
Status: DISCONTINUED | OUTPATIENT
Start: 2025-01-06 | End: 2025-01-08 | Stop reason: HOSPADM

## 2025-01-06 RX ORDER — FOLIC ACID 1 MG/1
1000 TABLET ORAL DAILY
Status: DISCONTINUED | OUTPATIENT
Start: 2025-01-07 | End: 2025-01-08 | Stop reason: HOSPADM

## 2025-01-06 RX ORDER — LANOLIN ALCOHOL/MO/W.PET/CERES
800 CREAM (GRAM) TOPICAL 2 TIMES DAILY
Status: DISCONTINUED | OUTPATIENT
Start: 2025-01-07 | End: 2025-01-08 | Stop reason: HOSPADM

## 2025-01-06 RX ORDER — LANOLIN ALCOHOL/MO/W.PET/CERES
100 CREAM (GRAM) TOPICAL DAILY
Status: DISCONTINUED | OUTPATIENT
Start: 2025-01-07 | End: 2025-01-08 | Stop reason: HOSPADM

## 2025-01-06 RX ORDER — DILTIAZEM HYDROCHLORIDE 60 MG/1
120 CAPSULE, EXTENDED RELEASE ORAL EVERY 12 HOURS SCHEDULED
Status: DISCONTINUED | OUTPATIENT
Start: 2025-01-07 | End: 2025-01-08 | Stop reason: HOSPADM

## 2025-01-06 RX ORDER — MAGNESIUM SULFATE HEPTAHYDRATE 40 MG/ML
2 INJECTION, SOLUTION INTRAVENOUS ONCE
Status: COMPLETED | OUTPATIENT
Start: 2025-01-06 | End: 2025-01-06

## 2025-01-06 RX ORDER — HEPARIN SODIUM 5000 [USP'U]/ML
5000 INJECTION, SOLUTION INTRAVENOUS; SUBCUTANEOUS EVERY 8 HOURS SCHEDULED
Status: DISCONTINUED | OUTPATIENT
Start: 2025-01-06 | End: 2025-01-08 | Stop reason: HOSPADM

## 2025-01-06 RX ORDER — ASPIRIN 81 MG/1
81 TABLET, CHEWABLE ORAL DAILY
Status: DISCONTINUED | OUTPATIENT
Start: 2025-01-07 | End: 2025-01-08 | Stop reason: HOSPADM

## 2025-01-06 RX ORDER — ALBUTEROL SULFATE 90 UG/1
2 INHALANT RESPIRATORY (INHALATION) EVERY 4 HOURS PRN
Status: DISCONTINUED | OUTPATIENT
Start: 2025-01-06 | End: 2025-01-08 | Stop reason: HOSPADM

## 2025-01-06 RX ORDER — FERROUS SULFATE 325(65) MG
325 TABLET ORAL
Status: DISCONTINUED | OUTPATIENT
Start: 2025-01-07 | End: 2025-01-08 | Stop reason: HOSPADM

## 2025-01-06 RX ADMIN — HEPARIN SODIUM 5000 UNITS: 5000 INJECTION, SOLUTION INTRAVENOUS; SUBCUTANEOUS at 21:13

## 2025-01-06 RX ADMIN — SODIUM CHLORIDE 75 ML/HR: 0.9 INJECTION, SOLUTION INTRAVENOUS at 18:14

## 2025-01-06 RX ADMIN — MAGNESIUM SULFATE HEPTAHYDRATE 2 G: 40 INJECTION, SOLUTION INTRAVENOUS at 12:53

## 2025-01-06 RX ADMIN — SODIUM CHLORIDE 500 ML: 0.9 INJECTION, SOLUTION INTRAVENOUS at 13:05

## 2025-01-06 RX ADMIN — SODIUM CHLORIDE 500 ML: 0.9 INJECTION, SOLUTION INTRAVENOUS at 11:25

## 2025-01-06 NOTE — ASSESSMENT & PLAN NOTE
Lab Results   Component Value Date    HGBA1C 5.2 11/14/2024       Recent Labs     01/06/25  1116   POCGLU 134       Blood Sugar Average: Last 72 hrs:  (P) 134    Chronic. Stable. Patient is home on 500mg Metformin tabs daily w/ breakfast    Plan:   Held home Metformin   ISS w/ accu checks  ACHS  Hypoglycemia protocol   YOAN/CHO carb diet   Continue to monitor

## 2025-01-06 NOTE — ASSESSMENT & PLAN NOTE
07/07 - Records reviewed. Admitted for hypoglycemia. Had recent cut lantus from 80 units day to 4o units but still low. Now holding insulin except SS. Pt looks much better than hx and imaging studies would suggest. None tender RUQ and tolerating diet. Outpt in [place] drain. Watch tonight and have Dr Vyas review in am. [facility] by [facility] Mily Jones. Schedule to see her for chemotx 07/08.    07/08 patient no new symptoms.  Tolerating diet.  Dr. Vyas reviewed x-rays and saw patient.  Would like to leave on antibiotics for a couple more weeks.  Patient has MRSA from drainage tube from gallbladder.  No pain or tenderness. No fever.  Normal white blood cell count.  Tolerate diet.  Blood sugar has done well off of insulin other than watching with sliding scale.  Told patient she needs to not restart the Lantus at this time but just followed with sliding scale using 1/2 previous dose of NovoLog.  If blood sugar starts climbing then could add Lantus 5 units and if climb some more a couple days later at 5 more units in so forth.  But not to change for couple of days between each time.  She is to follow up with Dr. Coombs in 1 week.  Continue with diabetic diet.   Dr. Vyas wished to restart the antibiotics.  Had just completed course of Bactrim 07/04 after having Klebsiella pneumonia UTI 06/19.  Patient having MRSA that was noted from drainage.  Talk with Dr. Vyas and will restart the Bactrim and he will check her in a couple weeks.  Dr. Coombs will see her in 1 week.  She is also to reschedule her appointment with her oncologist in [place].  Will discharge home.    Patient counseled on the importance of keeping all hospital follow up appointments. Stressed compliance with medications, therapies, or devices as prescribed in order to provide for the best health outcomes and decrease the risk of reoccurrence or further progression of issues.    Additionally, patient given written literature regarding  Lab Results   Component Value Date    EGFR 35 01/06/2025    EGFR 59 12/12/2024    EGFR 85 12/11/2024    CREATININE 1.95 (H) 01/06/2025    CREATININE 1.28 12/12/2024    CREATININE 0.95 12/11/2024     Likely Prerenal ENMA  Patient presents with Cr 1.95. Patient has a baseline Cr of 1. Patient states he has been feeling very weak lately, and becoming short of breath with exertion. Today he laid on the floor due to inability to catch his breath after exerting himself and EMS was called. Patient denies fall and LOC. Patient also admits to having decreased PO intake for the past week due to loss of appetite.      ED Course: 500 mL NS bolus x 2, 2g IV Mg SO4  CT head: No acute intracranial hemorrhage, significant mass effect or midline shift.   CT spine: No acute cervical spine fracture or traumatic malalignment.     Plan:   75 mL/hr IV NS (PMH of diastolic HF w/ latest LVEF of 65%)   Replete electrolytes as needed   CXR ordered  Monitor CMP  Avoid nephrotoxic agents   their current disease processes and home care recommendations.   Patient given opportunity to ask questions and verbalized understanding of all information discussed.  Reinforced patient's primary care provider can be a big assets in monitoring and organizing issues after discharge.

## 2025-01-06 NOTE — ASSESSMENT & PLAN NOTE
Patient has a h/o CAD s/p CABG. Patient is home on Aspirin 81 mg EC tablet daily, Ranolazine 500 mg Q12, and Eliquis 5 mg BID.    Plan:  Continue home Aspirin 81mg  Continue home Eliquis  Continue home Ranolazine

## 2025-01-06 NOTE — ASSESSMENT & PLAN NOTE
Chronic. Patient has had multiple hospitalizations due to Afib. Patient is home on 5 mg Eliquis BID, 120 mg Cardizem SR Q12, and 200 mg Lopressor Q12. Patient admits to being compliant with his Metoprolol, however he states he hasn't taken his Eliquis since his last hospital admission, due to a pending procedure.     EKG: A fib w/ a competing junctional pacemaker    Plan:  Continue home Lopressor  Continue home Eliquis   Continue home Cardizem   Continue to monitor

## 2025-01-06 NOTE — ED PROVIDER NOTES
Time reflects when diagnosis was documented in both MDM as applicable and the Disposition within this note       Time User Action Codes Description Comment    1/6/2025 12:47 PM Anepu, Trish Add [R53.1] Weakness     1/6/2025 12:47 PM Anepu, Trish Add [E86.0] Dehydration     1/6/2025 12:47 PM Anepu, Trish Add [N17.9] Acute kidney injury (HCC)     1/6/2025 12:47 PM Anepu, Trish Add [R55] Near syncope           ED Disposition       ED Disposition   Admit    Condition   Stable    Date/Time   Mon Jan 6, 2025 12:47 PM    Comment                   Assessment & Plan       Medical Decision Making  Patient evaluated with EKG labs imaging admitted to the [facility] for further evaluation and management    Problems Addressed:  Acute kidney injury (HCC): acute illness or injury  Dehydration: acute illness or injury  Near syncope: acute illness or injury  Weakness: acute illness or injury    Amount and/or Complexity of Data Reviewed  External Data Reviewed: notes.  Labs: ordered. Decision-making details documented in ED Course.  Radiology: ordered. Decision-making details documented in ED Course.  ECG/medicine tests: ordered and independent interpretation performed. Decision-making details documented in ED Course.    Risk  Prescription drug management.  Decision regarding hospitalization.             Medications   albuterol (PROVENTIL HFA,VENTOLIN HFA) inhaler 2 puff (has no administration in time range)   apixaban (ELIQUIS) tablet 5 mg (has no administration in time range)   aspirin chewable tablet 81 mg (has no administration in time range)   diltiazem (CARDIZEM SR) 12 hr capsule 120 mg (has no administration in time range)   ferrous sulfate tablet 325 mg (has no administration in time range)   folic acid (FOLVITE) tablet 1,000 mcg (has no administration in time range)   magnesium Oxide (MAG-OX) tablet 800 mg (has no administration in time range)   metoprolol tartrate (LOPRESSOR) tablet 200 mg (has no  administration in time range)   ranolazine (RANEXA) 12 hr tablet 500 mg (has no administration in time range)   tamsulosin (FLOMAX) capsule 0.4 mg (has no administration in time range)   thiamine tablet 100 mg (has no administration in time range)   nicotine (NICODERM CQ) 21 mg/24 hr TD 24 hr patch 1 patch (has no administration in time range)   heparin (porcine) subcutaneous injection 5,000 Units (has no administration in time range)   sodium chloride 0.9 % infusion (has no administration in time range)   insulin lispro (HumALOG/ADMELOG) 100 units/mL subcutaneous injection 1-6 Units (has no administration in time range)   fluticasone-vilanterol 200-25 mcg/actuation 1 puff (has no administration in time range)   sodium chloride 0.9 % bolus 500 mL (500 mL Intravenous New Bag 1/6/25 1125)   magnesium sulfate 2 g/50 mL IVPB (premix) 2 g (0 g Intravenous Stopped 1/6/25 1400)   sodium chloride 0.9 % bolus 500 mL (500 mL Intravenous New Bag 1/6/25 1305)       ED Risk Strat Scores                                              History of Present Illness       Chief Complaint   Patient presents with    Weakness - Generalized     Pt. brought in by squad for weakness, pt. Was at store at felt lightheaded.       Past Medical History:   Diagnosis Date    Acute on chronic kidney failure  (HCC)     Alcohol withdrawal (HCC) 06/07/2019    Atrial fibrillation (HCC)     Cancer (HCC)     prostate ca,had radiation    Cardiac disease     stents,then triple bypass    COPD (chronic obstructive pulmonary disease) (HCC)     Coronary artery disease     ETOH abuse     Heart failure (HCC)     History of heart surgery     says triple bypass [facility]    Hx of heart artery stent     2014    Hyperlipidemia     Hypertension     Hyponatremia 10/17/2019    Hypovolemic shock (HCC) 12/22/2019    Lumbar spondylitis (HCC) 10/13/2022    Metabolic acidosis, increased anion gap 04/21/2021    Nasal bone fracture 10/10/2022    Prostate CA (HCC)     S/P  CABG x 3     2004    Sleep apnea       Past Surgical History:   Procedure Laterality Date    CARDIAC CATHETERIZATION      2 stents    CORONARY ARTERY BYPASS GRAFT  2004    CT ARTHRD ANT INTERBODY MIN DSC CRV BELOW C2 N/A 12/16/2020    Procedure: Anterior cervical discectomy with fusion C4-C7; Posterior cervical decompression and fusion C2-T2;  Surgeon: David Rowell MD;  Location: [facility];  Service: Neurosurgery    TONSILLECTOMY        Family History   Problem Relation Age of Onset    Diabetes Mother     Uterine cancer Mother     COPD Father     Hypertension Father       Social History     Tobacco Use    Smoking status: Every Day     Current packs/day: 1.50     Average packs/day: 1.5 packs/day for 40.0 years (60.0 ttl pk-yrs)     Types: Cigarettes    Smokeless tobacco: Never   Vaping Use    Vaping status: Never Used   Substance Use Topics    Alcohol use: Yes     Alcohol/week: 4.0 standard drinks of alcohol     Types: 4 Standard drinks or equivalent per week     Comment: quart of vodka daily    Drug use: No      E-Cigarette/Vaping    E-Cigarette Use Never User       E-Cigarette/Vaping Substances    Nicotine Yes     THC No     CBD No     Flavoring No     Other No     Unknown No       I have reviewed and agree with the history as documented.     64-year-old male with a history of alcoholic cardiomyopathy atrial fibrillation currently on several different antihypertensives presents with an episode of lightheadedness while he was at the store.  He felt a little short of breath he denies any chest pain nausea vomiting syncope head trauma headache strokelike symptoms or any other symptoms he says he stopped taking his Eliquis and is complaining of some left-sided neck pain did not take any alcohol today.      History provided by:  Patient   used: No        Review of Systems   Constitutional: Negative.    HENT: Negative.     Eyes: Negative.    Respiratory:  Positive for shortness of breath.     Cardiovascular: Negative.    Gastrointestinal: Negative.    Endocrine: Negative.    Genitourinary: Negative.    Musculoskeletal: Negative.    Skin: Negative.    Allergic/Immunologic: Negative.    Neurological:  Positive for light-headedness.   Hematological: Negative.    Psychiatric/Behavioral: Negative.     All other systems reviewed and are negative.          Objective       ED Triage Vitals [01/06/25 1109]   Temperature Pulse Blood Pressure Respirations SpO2 Patient Position - Orthostatic VS   98.4 °F (36.9 °C) 60 (!) 88/51 18 95 % Lying      Temp Source Heart Rate Source BP Location FiO2 (%) Pain Score    Oral Monitor Left arm -- --      Vitals      Date and Time Temp Pulse SpO2 Resp BP Pain Score FACES Pain Rating User   01/06/25 1500 97.9 °F (36.6 °C) 82 99 % -- 115/56 -- -- AC   01/06/25 1315 -- 73 98 % 22 108/72 -- -- CS   01/06/25 1300 -- 74 98 % 21 114/61 -- -- CS   01/06/25 1245 -- 74 93 % 21 119/64 -- -- CS   01/06/25 1230 -- 74 94 % 21 104/74 -- -- CS   01/06/25 1215 -- 70 98 % 22 103/68 -- -- CS   01/06/25 1200 -- 70 96 % 21 96/58 -- -- CS   01/06/25 1109 98.4 °F (36.9 °C) 60 95 % 18 88/51 -- -- CS            Physical Exam  Vitals and nursing note reviewed.   Constitutional:       Appearance: Normal appearance.   HENT:      Head: Normocephalic and atraumatic.      Nose: Nose normal.      Mouth/Throat:      Mouth: Mucous membranes are moist.   Eyes:      Extraocular Movements: Extraocular movements intact.      Pupils: Pupils are equal, round, and reactive to light.   Cardiovascular:      Rate and Rhythm: Normal rate. Rhythm irregular.   Pulmonary:      Effort: Pulmonary effort is normal.      Breath sounds: Normal breath sounds.   Abdominal:      General: Abdomen is flat. Bowel sounds are normal.      Palpations: Abdomen is soft.   Musculoskeletal:         General: Normal range of motion.      Cervical back: Normal range of motion and neck supple.   Skin:     General: Skin is warm.      Capillary  Refill: Capillary refill takes less than 2 seconds.   Neurological:      General: No focal deficit present.      Mental Status: He is alert and oriented to person, place, and time. Mental status is at baseline.      Cranial Nerves: No cranial nerve deficit.      Sensory: No sensory deficit.      Motor: No weakness.      Coordination: Coordination normal.      Gait: Gait normal.      Deep Tendon Reflexes: Reflexes normal.   Psychiatric:         Mood and Affect: Mood normal.         Thought Content: Thought content normal.         Results Reviewed       Procedure Component Value Units Date/Time    HS Troponin I 2hr [[id number]]  (Normal) Collected: 01/06/25 1432    Lab Status: Final result Specimen: Blood from Arm, Left Updated: 01/06/25 1510     hs TnI 2hr 20 ng/L      Delta 2hr hsTnI -7 ng/L     Platelet count [[id number]]  (Abnormal) Collected: 01/06/25 1432    Lab Status: Final result Specimen: Blood from Arm, Left Updated: 01/06/25 1439     Platelets 138 Thousands/uL      MPV 10.8 fL     TSH, 3rd generation with Free T4 reflex [[id number]]  (Normal) Collected: 01/06/25 1122    Lab Status: Final result Specimen: Blood from Arm, Left Updated: 01/06/25 1205     TSH 3RD GENERATON 2.047 uIU/mL     HS Troponin 0hr (reflex protocol) [[id number]]  (Normal) Collected: 01/06/25 1122    Lab Status: Final result Specimen: Blood from Arm, Left Updated: 01/06/25 1156     hs TnI 0hr 27 ng/L     Comprehensive metabolic panel [[id number]]  (Abnormal) Collected: 01/06/25 1122    Lab Status: Final result Specimen: Blood from Arm, Left Updated: 01/06/25 1149     Sodium 132 mmol/L      Potassium 4.4 mmol/L      Chloride 98 mmol/L      CO2 25 mmol/L      ANION GAP 9 mmol/L      BUN 28 mg/dL      Creatinine 1.95 mg/dL      Glucose 123 mg/dL      Calcium 9.0 mg/dL      AST 20 U/L      ALT 15 U/L      Alkaline Phosphatase 78 U/L      Total Protein 7.2 g/dL      Albumin 3.9 g/dL      Total Bilirubin 0.61 mg/dL      eGFR 35 ml/min/1.73sq m      Narrative:      National Kid[employer] guidelines for Chronic Kidney Disease (CKD):     Stage 1 with normal or high GFR (GFR > 90 mL/min/1.73 square meters)    Stage 2 Mild CKD (GFR = 60-89 mL/min/1.73 square meters)    Stage 3A Moderate CKD (GFR = 45-59 mL/min/1.73 square meters)    Stage 3B Moderate CKD (GFR = 30-44 mL/min/1.73 square meters)    Stage 4 Severe CKD (GFR = 15-29 mL/min/1.73 square meters)    Stage 5 End Stage CKD (GFR <15 mL/min/1.73 square meters)  Note: GFR calculation is accurate only with a steady state creatinine    Magnesium [[id number]]  (Abnormal) Collected: 01/06/25 1122    Lab Status: Final result Specimen: Blood from Arm, Left Updated: 01/06/25 1149     Magnesium 1.4 mg/dL     Lipase [[id number]]  (Abnormal) Collected: 01/06/25 1122    Lab Status: Final result Specimen: Blood from Arm, Left Updated: 01/06/25 1149     Lipase 103 u/L     Fingerstick Glucose (POCT) [[id number]]  (Normal) Collected: 01/06/25 1116    Lab Status: Final result Specimen: Blood Updated: 01/06/25 1133     POC Glucose 134 mg/dl     CBC and differential [[id number]]  (Abnormal) Collected: 01/06/25 1122    Lab Status: Final result Specimen: Blood from Arm, Left Updated: 01/06/25 1131     WBC 8.20 Thousand/uL      RBC 3.76 Million/uL      Hemoglobin 11.7 g/dL      Hematocrit 35.9 %      MCV 96 fL      MCH 31.1 pg      MCHC 32.6 g/dL      RDW 14.6 %      MPV 10.7 fL      Platelets 170 Thousands/uL      nRBC 0 /100 WBCs      Segmented % 64 %      Immature Grans % 1 %      Lymphocytes % 22 %      Monocytes % 10 %      Eosinophils Relative 2 %      Basophils Relative 1 %      Absolute Neutrophils 5.26 Thousands/µL      Absolute Immature Grans 0.04 Thousand/uL      Absolute Lymphocytes 1.79 Thousands/µL      Absolute Monocytes 0.82 Thousand/µL      Eosinophils Absolute 0.19 Thousand/µL      Basophils Absolute 0.10 Thousands/µL             CT head without contrast   Final Interpretation by Lionel Lopez MD  (01/06 1213)      No acute intracranial hemorrhage, significant mass effect or midline shift.                  Workstation performed: [id number]         CT spine cervical without contrast   Final Interpretation by Lionel Lopez MD (01/06 1220)      No acute cervical spine fracture or traumatic malalignment.      Postsurgical changes discussed above.                  Workstation performed: [id number]         XR chest portable    (Results Pending)       ECG 12 Lead Documentation Only    Date/Time: 1/6/2025 4:43 PM    Performed by: Trish Madden DO  Authorized by: Trish Madden DO    ECG reviewed by me, the ED Provider: yes    Patient location:  ED  Previous ECG:     Previous ECG:  Unavailable    Comparison to cardiac monitor: Yes    Interpretation:     Interpretation: non-specific    Rate:     ECG rate assessment: normal    Rhythm:     Rhythm: atrial fibrillation    Ectopy:     Ectopy: none    QRS:     QRS axis:  Normal  Conduction:     Conduction: normal    ST segments:     ST segments:  Non-specific  T waves:     T waves: non-specific        ED Medication and Procedure Management   Prior to Admission Medications   Prescriptions Last Dose Informant Patient Reported? Taking?   Blood Glucose Monitoring Suppl (ONE TOUCH ULTRA MINI) w/Device KIT  Self Yes No   Sig: Use as directed   Patient not taking: Reported on 3/25/2024   Blood Pressure Monitoring (Adult Blood Pressure Cuff Lg) KIT   No No   Sig: Use in the morning   Patient not taking: Reported on 3/25/2024   Breo Ellipta 200-25 MCG/ACT inhaler  Self No No   Sig: Inhale 1 puff daily Rinse mouth after use.   ONETOUCH DELICA LANCETS FINE MISC  Self Yes No   Sig: 3 (three) times a day Test   Thiamine HCl (vitamin B-1) 100 MG TABS  Self No No   Sig: TAKE 1 TABLET DAILY   albuterol (Ventolin HFA) 90 mcg/act inhaler  Self No No   Sig: Inhale 2 puffs every 4 (four) hours as needed for wheezing   aluminum-magnesium hydroxide-simethicone (MAALOX) 9140-4219-503 mg/30 mL  suspension   No No   Sig: Take 30 mL by mouth every 4 (four) hours as needed for indigestion or heartburn   apixaban (Eliquis) 5 mg   No No   Sig: TAKE 1 TABLET BY MOUTH 2 TIMES A DAY DO NOT START BEFORE JANUARY 31, 2024.   aspirin (ECOTRIN LOW STRENGTH) 81 mg EC tablet  Self Yes No   Sig: Take 81 mg by mouth daily   diltiazem (CARDIZEM SR) 120 mg 12 hr capsule   No No   Sig: Take 1 capsule (120 mg total) by mouth every 12 (twelve) hours   ferrous sulfate 325 (65 Fe) mg tablet  Self No No   Sig: Take 1 tablet (325 mg total) by mouth daily with breakfast   folic acid (FOLVITE) 1 mg tablet   No No   Sig: TAKE 1 TABLET DAILY   gabapentin (NEURONTIN) 300 mg capsule   No No   Sig: TAKE ONE CAPSULE THREE TIMES DAILY   lidocaine (Lidoderm) 5 %   No No   Sig: Apply 1 patch topically over 12 hours daily Remove & Discard patch within 12 hours or as directed by MD   magnesium Oxide (MAG-OX) 400 mg TABS   No No   Sig: Take 2 tablets (800 mg total) by mouth 2 (two) times a day   metFORMIN (GLUCOPHAGE) 500 mg tablet   No No   Sig: TAKE 1 TABLET DAILY WITH BREAKFAST   metoprolol tartrate (LOPRESSOR) 100 mg tablet   No No   Sig: Take 2 tablets (200 mg total) by mouth every 12 (twelve) hours   naltrexone (REVIA) 50 mg tablet   No No   Sig: Take 1 tablet (50 mg total) by mouth daily   naproxen (Naprosyn) 500 mg tablet   No No   Sig: Take 1 tablet (500 mg total) by mouth 2 (two) times a day with meals   nicotine (NICODERM CQ) 21 mg/24 hr TD 24 hr patch  Self No No   Sig: Place 1 patch on the skin over 24 hours daily Do not start before December 4, 2023.   pantoprazole (PROTONIX) 40 mg tablet   No No   Sig: TAKE 1 TABLET TWO TIMES A DAY   ranolazine (RANEXA) 500 mg 12 hr tablet   No No   Sig: TAKE 1 TABLET EVERY 12 HOURS   senna-docusate sodium (SENOKOT S) 8.6-50 mg per tablet   No No   Sig: Take 1 tablet by mouth daily as needed for constipation   sodium chloride 1 g tablet   No No   Sig: Take 2 tablets (2 g total) by mouth 2 (two)  times a day with meals   Patient not taking: Reported on 12/11/2024   tamsulosin (FLOMAX) 0.4 mg   No No   Sig: TAKE 1 CAPSULE DAILY      Facility-Administered Medications: None     Current Discharge Medication List        CONTINUE these medications which have NOT CHANGED    Details   albuterol (Ventolin HFA) 90 mcg/act inhaler Inhale 2 puffs every 4 (four) hours as needed for wheezing  Qty: 18 g, Refills: 0    Comments: Substitution to a formulary equivalent within the same pharmaceutical class is authorized.  Associated Diagnoses: COPD (chronic obstructive pulmonary disease) ([facility])      aluminum-magnesium hydroxide-simethicone (MAALOX) 2785-0974-667 mg/30 mL suspension Take 30 mL by mouth every 4 (four) hours as needed for indigestion or heartburn  Qty: 769 mL, Refills: 1    Associated Diagnoses: Gastroesophageal reflux disease, unspecified whether esophagitis present      apixaban (Eliquis) 5 mg TAKE 1 TABLET BY MOUTH 2 TIMES A DAY DO NOT START BEFORE JANUARY 31, 2024.  Qty: 180 tablet, Refills: 2    Comments: PATIENT IS REQUESTING REFILLS. THANKS!  Associated Diagnoses: Paroxysmal atrial fibrillation ([facility])      aspirin (ECOTRIN LOW STRENGTH) 81 mg EC tablet Take 81 mg by mouth daily      Blood Glucose Monitoring Suppl (ONE TOUCH ULTRA MINI) w/Device KIT Use as directed  Refills: 0      Blood Pressure Monitoring (Adult Blood Pressure Cuff Lg) KIT Use in the morning  Qty: 1 kit, Refills: 0    Associated Diagnoses: Hypertension      Breo Ellipta 200-25 MCG/ACT inhaler Inhale 1 puff daily Rinse mouth after use.  Qty: 60 each, Refills: 3    Associated Diagnoses: COPD (chronic obstructive pulmonary disease) ([facility])      diltiazem (CARDIZEM SR) 120 mg 12 hr capsule Take 1 capsule (120 mg total) by mouth every 12 (twelve) hours  Qty: 60 capsule, Refills: 0    Associated Diagnoses: Chronic atrial fibrillation ([facility])      ferrous sulfate 325 (65 Fe) mg tablet Take 1 tablet (325 mg total) by mouth daily with breakfast  Qty: 60  tablet, Refills: 5    Associated Diagnoses: Alcohol abuse      folic acid (FOLVITE) 1 mg tablet TAKE 1 TABLET DAILY  Qty: 90 tablet, Refills: 2    Associated Diagnoses: Alcohol abuse      gabapentin (NEURONTIN) 300 mg capsule TAKE ONE CAPSULE THREE TIMES DAILY  Qty: 90 capsule, Refills: 1    Associated Diagnoses: Edema, spinal cord (HCC)      lidocaine (Lidoderm) 5 % Apply 1 patch topically over 12 hours daily Remove & Discard patch within 12 hours or as directed by MD  Qty: 30 patch, Refills: 0    Associated Diagnoses: Falls      magnesium Oxide (MAG-OX) 400 mg TABS Take 2 tablets (800 mg total) by mouth 2 (two) times a day  Qty: 120 tablet, Refills: 1    Associated Diagnoses: Hypomagnesemia      metFORMIN (GLUCOPHAGE) 500 mg tablet TAKE 1 TABLET DAILY WITH BREAKFAST  Qty: 90 tablet, Refills: 1    Associated Diagnoses: Type 2 diabetes mellitus with hyperosmolarity without coma, without long-term current use of insulin (HCC)      metoprolol tartrate (LOPRESSOR) 100 mg tablet Take 2 tablets (200 mg total) by mouth every 12 (twelve) hours  Qty: 120 tablet, Refills: 2    Associated Diagnoses: Atrial fibrillation with rapid ventricular response (HCC)      naltrexone (REVIA) 50 mg tablet Take 1 tablet (50 mg total) by mouth daily  Qty: 30 tablet, Refills: 2    Associated Diagnoses: Alcohol abuse      naproxen (Naprosyn) 500 mg tablet Take 1 tablet (500 mg total) by mouth 2 (two) times a day with meals  Qty: 30 tablet, Refills: 0    Associated Diagnoses: Back pain; Contusion      nicotine (NICODERM CQ) 21 mg/24 hr TD 24 hr patch Place 1 patch on the skin over 24 hours daily Do not start before December 4, 2023.  Qty: 28 patch, Refills: 0    Associated Diagnoses: Nicotine abuse      ONETOUCH DELICA LANCETS FINE MISC 3 (three) times a day Test  Refills: 0      pantoprazole (PROTONIX) 40 mg tablet TAKE 1 TABLET TWO TIMES A DAY  Qty: 60 tablet, Refills: 2    Associated Diagnoses: Gastroesophageal reflux disease, unspecified  whether esophagitis present      ranolazine (RANEXA) 500 mg 12 hr tablet TAKE 1 TABLET EVERY 12 HOURS  Qty: 60 tablet, Refills: 3    Associated Diagnoses: Essential (primary) hypertension      senna-docusate sodium (SENOKOT S) 8.6-50 mg per tablet Take 1 tablet by mouth daily as needed for constipation    Associated Diagnoses: Constipation      sodium chloride 1 g tablet Take 2 tablets (2 g total) by mouth 2 (two) times a day with meals  Qty: 120 tablet, Refills: 0    Associated Diagnoses: Chronic hyponatremia      tamsulosin (FLOMAX) 0.4 mg TAKE 1 CAPSULE DAILY  Qty: 90 capsule, Refills: 1    Associated Diagnoses: Hx of prostatic malignancy      Thiamine HCl (vitamin B-1) 100 MG TABS TAKE 1 TABLET DAILY  Qty: 90 tablet, Refills: 0    Associated Diagnoses: Alcohol abuse           No discharge procedures on file.  ED SEPSIS DOCUMENTATION   Time reflects when diagnosis was documented in both MDM as applicable and the Disposition within this note       Time User Action Codes Description Comment    1/6/2025 12:47 PM Trish Madden Add [R53.1] Weakness     1/6/2025 12:47 PM Trish Madden Add [E86.0] Dehydration     1/6/2025 12:47 PM Trish Madden Add [N17.9] Acute kidney injury (HCC)     1/6/2025 12:47 PM Trish Madden Add [R55] Near syncope                  Trish Madden, DO  01/06/25 7510

## 2025-01-06 NOTE — ASSESSMENT & PLAN NOTE
Lab Results   Component Value Date    HGBA1C 5.2 11/14/2024       Recent Labs     01/06/25  2105 01/07/25  0817 01/07/25  1102 01/07/25  1628   POCGLU 117 90 96 96       Blood Sugar Average: Last 72 hrs:  (P) 106.6    Chronic. Stable. Patient is home on 500mg Metformin tabs daily w/ breakfast  Make sure to give Regular Diet during inpatient admissions (patient preference)     Plan:   Held home Metformin   ISS w/ accu checks  ACHS  Hypoglycemia protocol   Regular diet   Continue to monitor

## 2025-01-06 NOTE — ASSESSMENT & PLAN NOTE
Chronic. Stable. PMH of prostate Ca in 2020, s/p resection in 2021. Home on 0.4 mg Flomax daily.     Plan:   Continue home Flomax

## 2025-01-06 NOTE — ASSESSMENT & PLAN NOTE
Patient has been feeling unwell for the past week. He states he has not been eating or drinking much for the past week. Patient normally ambulates with a walker, but states that today he felt very short of breath with exertion. Patient denies n/v/d.     Plan:   S/p 500mL IV NS bolus x 2 in ED  75 mL/hr of continuous IV NS  Replete electrolytes as needed  Orthostatics in place  Fall precautions  Consider PT/OT eval   Continue to monitor

## 2025-01-06 NOTE — ASSESSMENT & PLAN NOTE
Chronic. Stable. Smoke 1.5 packs x day. Patient states that he doesn't use his nicotine patch at home.    Plan:   Nicotine patch 21 mg / 24-hour   Smoking cessation instructions prior to discharge

## 2025-01-06 NOTE — ASSESSMENT & PLAN NOTE
Chronic. Stable. Patient reported worsening SOB with exertion today. Patient is home on Breo Ellipta inhaler and Albuterol inhaler at home.     Patient currently breathing fine on room air.    Plan:  CXR ordered  Continue home Breo ellipta inhaler  Continue home Albuterol inhaler

## 2025-01-06 NOTE — H&P
H&P - Family Medicine   Name: Gregg Partida 64 y.o. male I MRN: [id number]  Unit/Bed#: [address] [place] [address] Date of Admission: 1/6/2025   Date of Service: 1/6/2025 I Hospital Day: 0     Assessment & Plan  Acute kidney injury superimposed on chronic kidney disease  (HCC)  Lab Results   Component Value Date    EGFR 35 01/06/2025    EGFR 59 12/12/2024    EGFR 85 12/11/2024    CREATININE 1.95 (H) 01/06/2025    CREATININE 1.28 12/12/2024    CREATININE 0.95 12/11/2024     Likely Prerenal ENMA  Patient presents with Cr 1.95. Patient has a baseline Cr of 1. Patient states he has been feeling very weak lately, and becoming short of breath with exertion. Today he laid on the floor due to inability to catch his breath after exerting himself and EMS was called. Patient denies fall and LOC. Patient also admits to having decreased PO intake for the past week due to loss of appetite.      ED Course: 500 mL NS bolus x 2, 2g IV Mg SO4  CT head: No acute intracranial hemorrhage, significant mass effect or midline shift.   CT spine: No acute cervical spine fracture or traumatic malalignment.     Plan:   75 mL/hr IV NS (PMH of diastolic HF w/ latest LVEF of 65%)   Replete electrolytes as needed   CXR ordered  Monitor CMP  Avoid nephrotoxic agents  Generalized weakness  Patient has been feeling unwell for the past week. He states he has not been eating or drinking much for the past week. Patient normally ambulates with a walker, but states that today he felt very short of breath with exertion. Patient denies n/v/d.     Plan:   S/p 500mL IV NS bolus x 2 in ED  75 mL/hr of continuous IV NS  Replete electrolytes as needed  Orthostatics in place  Fall precautions  Consider PT/OT eval   Continue to monitor   Electrolyte abnormality  Patient presents with hyponatremia and hypomagnesemia in the setting of ENMA on CKD. Patient admits to decreased PO intake and denies nausea, vomiting, and diarrhea.    Labs: Na 132, Mg 1.4    Plan:   Replete  electrolytes as needed  IVF hydration   Continue to monitor labs  Hypertension  Chronic. Stable. Patient is home on Lopressor 200 mg Q12, Ranolazine 500 mg Q12, and Cardizem  mg Q12. Patient took all his meds this morning prior to coming to the ED.  Patient BP on admission was 88/51.     Plan:   Continue home Lopressor, Ranolazine, and Cardizem (starting 1/7/25)  Holding parameters in place   Monitor BP  Plan to space out antihypertensives before discharge  COPD, severity to be determined (HCC)  Chronic. Stable. Patient reported worsening SOB with exertion today. Patient is home on Breo Ellipta inhaler and Albuterol inhaler at home.     Patient currently breathing fine on room air.    Plan:  CXR ordered  Continue home Breo ellipta inhaler  Continue home Albuterol inhaler  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 disease (HCC)  Lab Results   Component Value Date    HGBA1C 5.2 11/14/2024       Recent Labs     01/06/25  1116   POCGLU 134       Blood Sugar Average: Last 72 hrs:  (P) 134    Chronic. Stable. Patient is home on 500mg Metformin tabs daily w/ breakfast    Plan:   Held home Metformin   ISS w/ accu checks  ACHS  Hypoglycemia protocol   YOAN/CHO carb diet   Continue to monitor   History of prostate cancer  Chronic. Stable. PMH of prostate Ca in 2020, s/p resection in 2021. Home on 0.4 mg Flomax daily.     Plan:   Continue home Flomax  CAD (coronary artery disease)  Patient has a h/o CAD s/p CABG. Patient is home on Aspirin 81 mg EC tablet daily, Ranolazine 500 mg Q12, and Eliquis 5 mg BID.    Plan:  Continue home Aspirin 81mg  Continue home Eliquis  Continue home Ranolazine   Nicotine abuse  Chronic. Stable. Smoke 1.5 packs x day. Patient states that he doesn't use his nicotine patch at home.    Plan:   Nicotine patch 21 mg / 24-hour   Smoking cessation instructions prior to discharge  History of atrial fibrillation  Chronic. Patient has had multiple hospitalizations due to Afib. Patient is  home on 5 mg Eliquis BID, 120 mg Cardizem SR Q12, and 200 mg Lopressor Q12. Patient admits to being compliant with his Metoprolol, however he states he hasn't taken his Eliquis since his last hospital admission, due to a pending procedure.     EKG: A fib w/ a competing junctional pacemaker    Plan:  Continue home Lopressor  Continue home Eliquis   Continue home Cardizem   Continue to monitor     Code Status: Level 1 - Full Code  Admission Status: OBSERVATION  Disposition: Patient requires Med Surg    Admit to team: Family Medicine    History of Present Illness   Patient is a 64 yr. old male w/ PMH of Afib, CKD, CAD, COPD, ETOH abuse, CHF, HLD, HTN, s/p CABG, Prostate Ca s/p resection, and SUNITA, who presents w/ generalized weakness and SOB w/ exertion. Patient states that today he took the bus and got off at the bus stop and walked to the bank with his walker. While going to the bank he was experiencing SOB. Then he went to the [employer] order building and was still experiencing this SOB, so he sat down. The SOB didn't resolve, and he decided to lay on the floor in hopes of attaining relief; after which, bystanders called EMS. Patient also states he hasn't had a full meal in about 1 week due to loss of appetite.   Patient denies fall, syncope, fever, dizziness, lightheadedness, chest pain, n/v/d.    Review of Systems   Constitutional:  Negative for chills and fever.   HENT:  Negative for ear pain.    Eyes:  Negative for photophobia and pain.   Respiratory:  Positive for shortness of breath (w/ exertion). Negative for cough.    Cardiovascular:  Negative for chest pain.   Gastrointestinal:  Negative for abdominal pain, diarrhea, nausea and vomiting.   Genitourinary:  Negative for difficulty urinating.   Musculoskeletal:  Negative for back pain and neck pain.   Neurological:  Negative for dizziness, syncope, light-headedness, numbness and headaches.   Psychiatric/Behavioral:  Negative for behavioral problems, confusion and  hallucinations.      I have reviewed the patient's PMH, PSH, Social History, Family History, Meds, and Allergies    Objective :  Temp:  [97.9 °F (36.6 °C)-98.4 °F (36.9 °C)] 97.9 °F (36.6 °C)  HR:  [60-82] 82  BP: ()/(51-74) 115/56  Resp:  [18-22] 22  SpO2:  [93 %-99 %] 99 %  O2 Device: None (Room air)    Intake & Output:  I/O         01/04 0701 01/05 0700 01/05 0701 01/06 0700 01/06 0701 01/07 0700    IV Piggyback   50    Total Intake   50    Net   +50                 Weights:        There is no height or weight on file to calculate BMI.  Weight (last 2 days)       None          Physical Exam  Constitutional:       General: He is not in acute distress.     Appearance: Normal appearance. He is normal weight. He is not ill-appearing or toxic-appearing.   HENT:      Head: Normocephalic and atraumatic.      Nose: Nose normal.      Mouth/Throat:      Mouth: Mucous membranes are moist.      Pharynx: Oropharynx is clear.   Eyes:      Conjunctiva/sclera: Conjunctivae normal.   Cardiovascular:      Rate and Rhythm: Normal rate. Rhythm irregular.      Pulses: Normal pulses.      Heart sounds: Normal heart sounds.   Pulmonary:      Effort: Pulmonary effort is normal. No respiratory distress.      Breath sounds: Normal breath sounds.   Abdominal:      General: Abdomen is flat. Bowel sounds are normal.      Palpations: Abdomen is soft.      Tenderness: There is no abdominal tenderness. There is no guarding or rebound.   Musculoskeletal:         General: Normal range of motion.      Cervical back: Normal range of motion and neck supple.      Right lower leg: No edema.      Left lower leg: No edema.   Skin:     General: Skin is warm and dry.   Neurological:      General: No focal deficit present.      Mental Status: He is alert and oriented to person, place, and time. Mental status is at baseline.      Motor: No weakness.   Psychiatric:         Mood and Affect: Mood normal.         Behavior: Behavior normal.          Thought Content: Thought content normal.         Judgment: Judgment normal.           Lab Results: I have reviewed the following results:  Recent Labs     01/06/25  1122 01/06/25  1432   WBC 8.20  --    HGB 11.7*  --    HCT 35.9*  --     138*   SODIUM 132*  --    K 4.4  --    CL 98  --    CO2 25  --    BUN 28*  --    CREATININE 1.95*  --    GLUC 123  --    MG 1.4*  --    AST 20  --    ALT 15  --    ALB 3.9  --    TBILI 0.61  --    ALKPHOS 78  --    HSTNI0 27  --    HSTNI2  --  20     Micro:  Lab Results   Component Value Date    BLOODCX No Growth After 5 Days. 11/16/2024    BLOODCX No Growth After 5 Days. 11/16/2024    BLOODCX No Growth After 5 Days. 05/10/2024    URINECX No Growth <1000 cfu/mL 10/29/2023       Imaging Results Review: I personally reviewed the following image studies/reports in PACS and discussed pertinent findings with Radiology: CT head. My interpretation of the radiology images/reports is:  No acute intracranial hemorrhage, significant mass effect or midline shift. .  Other Study Results Review: EKG was reviewed.     Currently Ordered Meds:   Current Facility-Administered Medications:     albuterol (PROVENTIL HFA,VENTOLIN HFA) inhaler 2 puff, Q4H PRN    apixaban (ELIQUIS) tablet 5 mg, BID    [START ON 1/7/2025] aspirin chewable tablet 81 mg, Daily    [START ON 1/7/2025] diltiazem (CARDIZEM SR) 12 hr capsule 120 mg, Q12H SEDA    [START ON 1/7/2025] ferrous sulfate tablet 325 mg, Daily With Breakfast    fluticasone-vilanterol 200-25 mcg/actuation 1 puff, Daily    [START ON 1/7/2025] folic acid (FOLVITE) tablet 1,000 mcg, Daily    heparin (porcine) subcutaneous injection 5,000 Units, Q8H SEDA **AND** [COMPLETED] Platelet count, Once    insulin lispro (HumALOG/ADMELOG) 100 units/mL subcutaneous injection 1-6 Units, 4x Daily (AC & HS) **AND** Fingerstick Glucose (POCT), 4x Daily AC and at bedtime    [START ON 1/7/2025] magnesium Oxide (MAG-OX) tablet 800 mg, BID    [START ON 1/7/2025] metoprolol  "tartrate (LOPRESSOR) tablet 200 mg, Q12H SEDA    nicotine (NICODERM CQ) 21 mg/24 hr TD 24 hr patch 1 patch, Daily    [START ON 1/7/2025] ranolazine (RANEXA) 12 hr tablet 500 mg, Q12H SEDA    sodium chloride 0.9 % infusion, Continuous    [START ON 1/7/2025] tamsulosin (FLOMAX) capsule 0.4 mg, Daily With Dinner    [START ON 1/7/2025] thiamine tablet 100 mg, Daily  VTE Pharmacologic Prophylaxis: Heparin  VTE Mechanical Prophylaxis: sequential compression device    Administrative Statements   I have spent a total time of 45 minutes in caring for this patient on the day of the visit/encounter including Diagnostic results, Prognosis, Risks and benefits of tx options, Risk factor reductions, Impressions, Counseling / Coordination of care, Documenting in the medical record, Reviewing / ordering tests, medicine, procedures  , and Obtaining or reviewing history  .  Portions of the record may have been created with voice recognition software.  Occasional wrong word or \"sound a like\" substitutions may have occurred due to the inherent limitations of voice recognition software.  Read the chart carefully and recognize, using context, where substitutions have occurred.  ==  Nicky Messer DO  [facility]  Family Medicine Residency PGY-1  "

## 2025-01-06 NOTE — ASSESSMENT & PLAN NOTE
Chronic. Stable. Patient is home on Lopressor 200 mg Q12, Ranolazine 500 mg Q12, and Cardizem  mg Q12. Patient took all his meds this morning prior to coming to the ED.  Patient BP on admission was 88/51.     Plan:   Continue home Lopressor, Ranolazine, and Cardizem (starting 1/7/25)  Holding parameters in place   Monitor BP  Plan to space out antihypertensives before discharge

## 2025-01-06 NOTE — ASSESSMENT & PLAN NOTE
Patient presents with hyponatremia and hypomagnesemia in the setting of ENMA on CKD. Patient admits to decreased PO intake and denies nausea, vomiting, and diarrhea.    Labs: Na 132, Mg 1.4    Plan:   Replete electrolytes as needed  IVF hydration   Continue to monitor labs

## 2025-01-07 ENCOUNTER — APPOINTMENT (OUTPATIENT)
Dept: RADIOLOGY | Facility: HOSPITAL | Age: 65
DRG: 683 | End: 2025-01-07
Payer: COMMERCIAL

## 2025-01-07 PROBLEM — R06.09 DYSPNEA ON EXERTION: Status: ACTIVE | Noted: 2025-01-07

## 2025-01-07 LAB
ALBUMIN SERPL BCG-MCNC: 3.7 G/DL (ref 3.5–5)
ALP SERPL-CCNC: 72 U/L (ref 34–104)
ALT SERPL W P-5'-P-CCNC: 15 U/L (ref 7–52)
ANION GAP SERPL CALCULATED.3IONS-SCNC: 4 MMOL/L (ref 4–13)
AST SERPL W P-5'-P-CCNC: 23 U/L (ref 13–39)
ATRIAL RATE: 49 BPM
BASOPHILS # BLD AUTO: 0.1 THOUSANDS/ΜL (ref 0–0.1)
BASOPHILS NFR BLD AUTO: 2 % (ref 0–1)
BILIRUB SERPL-MCNC: 0.42 MG/DL (ref 0.2–1)
BNP SERPL-MCNC: 1041 PG/ML (ref 0–100)
BUN SERPL-MCNC: 30 MG/DL (ref 5–25)
CALCIUM SERPL-MCNC: 8.2 MG/DL (ref 8.4–10.2)
CHLORIDE SERPL-SCNC: 103 MMOL/L (ref 96–108)
CO2 SERPL-SCNC: 25 MMOL/L (ref 21–32)
CREAT SERPL-MCNC: 1.28 MG/DL (ref 0.6–1.3)
EOSINOPHIL # BLD AUTO: 0.16 THOUSAND/ΜL (ref 0–0.61)
EOSINOPHIL NFR BLD AUTO: 3 % (ref 0–6)
ERYTHROCYTE [DISTWIDTH] IN BLOOD BY AUTOMATED COUNT: 14.7 % (ref 11.6–15.1)
GFR SERPL CREATININE-BSD FRML MDRD: 59 ML/MIN/1.73SQ M
GLUCOSE P FAST SERPL-MCNC: 99 MG/DL (ref 65–99)
GLUCOSE SERPL-MCNC: 122 MG/DL (ref 65–140)
GLUCOSE SERPL-MCNC: 130 MG/DL (ref 65–140)
GLUCOSE SERPL-MCNC: 90 MG/DL (ref 65–140)
GLUCOSE SERPL-MCNC: 96 MG/DL (ref 65–140)
GLUCOSE SERPL-MCNC: 96 MG/DL (ref 65–140)
GLUCOSE SERPL-MCNC: 99 MG/DL (ref 65–140)
HCT VFR BLD AUTO: 36.3 % (ref 36.5–49.3)
HGB BLD-MCNC: 11.6 G/DL (ref 12–17)
IMM GRANULOCYTES # BLD AUTO: 0.02 THOUSAND/UL (ref 0–0.2)
IMM GRANULOCYTES NFR BLD AUTO: 0 % (ref 0–2)
LYMPHOCYTES # BLD AUTO: 1.61 THOUSANDS/ΜL (ref 0.6–4.47)
LYMPHOCYTES NFR BLD AUTO: 26 % (ref 14–44)
MAGNESIUM SERPL-MCNC: 1.7 MG/DL (ref 1.9–2.7)
MCH RBC QN AUTO: 31.2 PG (ref 26.8–34.3)
MCHC RBC AUTO-ENTMCNC: 32 G/DL (ref 31.4–37.4)
MCV RBC AUTO: 98 FL (ref 82–98)
MONOCYTES # BLD AUTO: 0.66 THOUSAND/ΜL (ref 0.17–1.22)
MONOCYTES NFR BLD AUTO: 11 % (ref 4–12)
NEUTROPHILS # BLD AUTO: 3.63 THOUSANDS/ΜL (ref 1.85–7.62)
NEUTS SEG NFR BLD AUTO: 58 % (ref 43–75)
NRBC BLD AUTO-RTO: 0 /100 WBCS
PLATELET # BLD AUTO: 137 THOUSANDS/UL (ref 149–390)
PMV BLD AUTO: 10.9 FL (ref 8.9–12.7)
POTASSIUM SERPL-SCNC: 4.2 MMOL/L (ref 3.5–5.3)
PROT SERPL-MCNC: 7 G/DL (ref 6.4–8.4)
QRS AXIS: 81 DEGREES
QRSD INTERVAL: 90 MS
QT INTERVAL: 422 MS
QTC INTERVAL: 442 MS
RBC # BLD AUTO: 3.72 MILLION/UL (ref 3.88–5.62)
SODIUM SERPL-SCNC: 132 MMOL/L (ref 135–147)
T WAVE AXIS: 35 DEGREES
VENTRICULAR RATE: 66 BPM
WBC # BLD AUTO: 6.18 THOUSAND/UL (ref 4.31–10.16)

## 2025-01-07 PROCEDURE — 83735 ASSAY OF MAGNESIUM: CPT

## 2025-01-07 PROCEDURE — 99232 SBSQ HOSP IP/OBS MODERATE 35: CPT | Performed by: FAMILY MEDICINE

## 2025-01-07 PROCEDURE — 83880 ASSAY OF NATRIURETIC PEPTIDE: CPT | Performed by: FAMILY MEDICINE

## 2025-01-07 PROCEDURE — 93010 ELECTROCARDIOGRAM REPORT: CPT | Performed by: INTERNAL MEDICINE

## 2025-01-07 PROCEDURE — 80053 COMPREHEN METABOLIC PANEL: CPT

## 2025-01-07 PROCEDURE — 82948 REAGENT STRIP/BLOOD GLUCOSE: CPT

## 2025-01-07 PROCEDURE — 71045 X-RAY EXAM CHEST 1 VIEW: CPT

## 2025-01-07 PROCEDURE — 85025 COMPLETE CBC W/AUTO DIFF WBC: CPT

## 2025-01-07 RX ORDER — MAGNESIUM SULFATE HEPTAHYDRATE 40 MG/ML
2 INJECTION, SOLUTION INTRAVENOUS ONCE
Status: COMPLETED | OUTPATIENT
Start: 2025-01-07 | End: 2025-01-07

## 2025-01-07 RX ORDER — CALCIUM CARBONATE 500 MG/1
500 TABLET, CHEWABLE ORAL ONCE
Status: COMPLETED | OUTPATIENT
Start: 2025-01-07 | End: 2025-01-07

## 2025-01-07 RX ORDER — IPRATROPIUM BROMIDE AND ALBUTEROL SULFATE 2.5; .5 MG/3ML; MG/3ML
3 SOLUTION RESPIRATORY (INHALATION)
Status: DISCONTINUED | OUTPATIENT
Start: 2025-01-07 | End: 2025-01-08 | Stop reason: HOSPADM

## 2025-01-07 RX ORDER — FUROSEMIDE 10 MG/ML
20 INJECTION INTRAMUSCULAR; INTRAVENOUS ONCE
Status: COMPLETED | OUTPATIENT
Start: 2025-01-07 | End: 2025-01-07

## 2025-01-07 RX ADMIN — FOLIC ACID 1000 MCG: 1 TABLET ORAL at 09:36

## 2025-01-07 RX ADMIN — RANOLAZINE 500 MG: 500 TABLET, FILM COATED, EXTENDED RELEASE ORAL at 21:26

## 2025-01-07 RX ADMIN — FUROSEMIDE 20 MG: 10 INJECTION, SOLUTION INTRAMUSCULAR; INTRAVENOUS at 21:26

## 2025-01-07 RX ADMIN — DILTIAZEM HYDROCHLORIDE 120 MG: 60 CAPSULE, EXTENDED RELEASE ORAL at 09:45

## 2025-01-07 RX ADMIN — HEPARIN SODIUM 5000 UNITS: 5000 INJECTION, SOLUTION INTRAVENOUS; SUBCUTANEOUS at 21:26

## 2025-01-07 RX ADMIN — FERROUS SULFATE TAB 325 MG (65 MG ELEMENTAL FE) 325 MG: 325 (65 FE) TAB at 09:36

## 2025-01-07 RX ADMIN — HEPARIN SODIUM 5000 UNITS: 5000 INJECTION, SOLUTION INTRAVENOUS; SUBCUTANEOUS at 05:11

## 2025-01-07 RX ADMIN — FLUTICASONE FUROATE AND VILANTEROL TRIFENATATE 1 PUFF: 200; 25 POWDER RESPIRATORY (INHALATION) at 09:45

## 2025-01-07 RX ADMIN — HEPARIN SODIUM 5000 UNITS: 5000 INJECTION, SOLUTION INTRAVENOUS; SUBCUTANEOUS at 14:24

## 2025-01-07 RX ADMIN — TAMSULOSIN HYDROCHLORIDE 0.4 MG: 0.4 CAPSULE ORAL at 17:21

## 2025-01-07 RX ADMIN — Medication 800 MG: at 09:36

## 2025-01-07 RX ADMIN — APIXABAN 5 MG: 5 TABLET, FILM COATED ORAL at 17:22

## 2025-01-07 RX ADMIN — THIAMINE HCL TAB 100 MG 100 MG: 100 TAB at 09:36

## 2025-01-07 RX ADMIN — DILTIAZEM HYDROCHLORIDE 120 MG: 60 CAPSULE, EXTENDED RELEASE ORAL at 21:28

## 2025-01-07 RX ADMIN — MAGNESIUM SULFATE HEPTAHYDRATE 2 G: 40 INJECTION, SOLUTION INTRAVENOUS at 09:54

## 2025-01-07 RX ADMIN — METOPROLOL TARTRATE 200 MG: 100 TABLET, FILM COATED ORAL at 09:36

## 2025-01-07 RX ADMIN — ASPIRIN 81 MG CHEWABLE TABLET 81 MG: 81 TABLET CHEWABLE at 09:36

## 2025-01-07 RX ADMIN — APIXABAN 5 MG: 5 TABLET, FILM COATED ORAL at 09:36

## 2025-01-07 RX ADMIN — RANOLAZINE 500 MG: 500 TABLET, FILM COATED, EXTENDED RELEASE ORAL at 09:36

## 2025-01-07 RX ADMIN — NICOTINE 1 PATCH: 21 PATCH, EXTENDED RELEASE TRANSDERMAL at 09:42

## 2025-01-07 RX ADMIN — SODIUM CHLORIDE 75 ML/HR: 0.9 INJECTION, SOLUTION INTRAVENOUS at 06:46

## 2025-01-07 RX ADMIN — Medication 800 MG: at 17:22

## 2025-01-07 RX ADMIN — ANTACID TABLETS 500 MG: 500 TABLET, CHEWABLE ORAL at 09:36

## 2025-01-07 RX ADMIN — METOPROLOL TARTRATE 200 MG: 100 TABLET, FILM COATED ORAL at 21:26

## 2025-01-07 RX ADMIN — Medication 6 MG: at 00:19

## 2025-01-07 NOTE — PROGRESS NOTES
Progress Note - Family Medicine   Name: Gregg Partida 64 y.o. male I MRN: [id number]  Unit/Bed#: [address] [address]-02 I Date of Admission: 1/6/2025   Date of Service: 1/7/2025 I Hospital Day: 0     Assessment & Plan  Acute kidney injury superimposed on chronic kidney disease  (HCC)  Lab Results   Component Value Date    EGFR 59 01/07/2025    EGFR 35 01/06/2025    EGFR 59 12/12/2024    CREATININE 1.28 01/07/2025    CREATININE 1.95 (H) 01/06/2025    CREATININE 1.28 12/12/2024     Likely Prerenal ENMA from dehydration vs. Hypotension due to meds  Patient presents with Cr 1.95. Patient has a baseline Cr of 1. Patient states he has been feeling very weak lately, and becoming short of breath with exertion. Today he laid on the floor due to inability to catch his breath after exerting himself and EMS was called. Patient denies fall and LOC. Patient also admits to having decreased PO intake for the past week due to loss of appetite.      ED Course: 500 mL NS bolus x 2, 2g IV Mg SO4  CT head: No acute intracranial hemorrhage, significant mass effect or midline shift.   CT spine: No acute cervical spine fracture or traumatic malalignment.   CXR 1/6: No acute cardiopulmonary disease.     Labs: Cr 1.28    Plan:   Dc'ed 75 mL/hr IV NS   BNP ordered  Replete electrolytes as needed   Monitor CMP  Avoid nephrotoxic agents  Hypertension  Chronic. Stable. Patient is home on Lopressor 200 mg Q12, Ranolazine 500 mg Q12, and Cardizem  mg Q12. Patient took all his meds this morning prior to coming to the ED.  Patient BP on admission was 88/51.     Plan:   Continue home Lopressor, Ranolazine, and Cardizem (starting 1/7/25)  Holding parameters in place   Monitor BP  Plan to space out antihypertensives before discharge  Dyspnea on exertion  Patient came in with complaints of SOB with exertion. Patient is not on any oxygen at baseline, and isn't requiring any supplemental oxygen during this admission.  CXR: No acute cardiopulmonary disease.    ECHO: PAP 40 to 45 mm of hg   Walk study on room air showed desaturation to 76     Plan:   STAT CXR: No acute cardiopulmonary disease. Unchanged cardiomediastinal silhouette. Enlarged central pulmonary vasculature may represent pulmonary arterial hypertension.  75 mL/hr IV NS discontinued   BNP ordered  Continue to monitor   COPD, severity to be determined (HCC)  Chronic. Stable. Patient reported worsening SOB with exertion today. Patient is home on Breo Ellipta inhaler and Albuterol inhaler at home.   CXR 1/6: No acute cardiopulmonary disease.     Patient currently breathing fine on room air.    Plan:  Continue home Breo ellipta inhaler  Continue home Albuterol inhaler  Duo-neb inhalation soln.  History of atrial fibrillation  Chronic. Patient has had multiple hospitalizations due to Afib. Patient is home on 5 mg Eliquis BID, 120 mg Cardizem SR Q12, and 200 mg Lopressor Q12. Patient admits to being compliant with his Metoprolol, however he states he hasn't taken his Eliquis since his last hospital admission, due to a pending procedure.     EKG: A fib w/ a competing junctional pacemaker    Plan:  Continue home Lopressor  Continue home Eliquis   Continue home Cardizem   Continue to monitor   Generalized weakness  Patient has been feeling unwell for the past week. He states he has not been eating or drinking much for the past week. Patient normally ambulates with a walker, but states that today he felt very short of breath with exertion. Patient denies n/v/d.     Plan:   S/p 500mL IV NS bolus x 2 in ED  Dc'ed 75 mL/hr of continuous IV NS  Replete electrolytes as needed  Orthostatics in place  Fall precautions  Consider PT/OT eval   Continue to monitor   Electrolyte abnormality  Patient presents with hyponatremia and hypomagnesemia in the setting of ENMA on CKD. Patient admits to decreased PO intake and denies nausea, vomiting, and diarrhea.    Labs: Na 132, Mg 1.4    Plan:   Replete electrolytes as needed  S/p IVF  hydration   Continue to monitor labs  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 disease (HCC)  Lab Results   Component Value Date    HGBA1C 5.2 11/14/2024       Recent Labs     01/06/25  2105 01/07/25  0817 01/07/25  1102 01/07/25  1628   POCGLU 117 90 96 96       Blood Sugar Average: Last 72 hrs:  (P) 106.6    Chronic. Stable. Patient is home on 500mg Metformin tabs daily w/ breakfast  Make sure to give Regular Diet during inpatient admissions (patient preference)     Plan:   Held home Metformin   ISS w/ accu checks  ACHS  Hypoglycemia protocol   Regular diet   Continue to monitor   History of prostate cancer  Chronic. Stable. PMH of prostate Ca in 2020, s/p resection in 2021. Home on 0.4 mg Flomax daily.     Plan:   Continue home Flomax  CAD (coronary artery disease)  Patient has a h/o CAD s/p CABG. Patient is home on Aspirin 81 mg EC tablet daily, Ranolazine 500 mg Q12, and Eliquis 5 mg BID.    Plan:  Continue home Aspirin 81mg  Continue home Eliquis  Continue home Ranolazine   Nicotine abuse  Chronic. Stable. Smoke 1.5 packs x day. Patient states that he doesn't use his nicotine patch at home.    Plan:   Nicotine patch 21 mg / 24-hour   Smoking cessation instructions prior to discharge    VTE Pharmacologic Prophylaxis: VTE Score: 4 Moderate Risk (Score 3-4) - Pharmacological DVT Prophylaxis Ordered: apixaban (Eliquis).    Mobility:   Basic Mobility Inpatient Raw Score: 12  JH-HLM Goal: 4: Move to chair/commode  JH-HLM Achieved: 6: Walk 10 steps or more  JH-HLM Goal achieved. Continue to encourage appropriate mobility.    Patient Centered Rounds: I performed bedside rounds with nursing staff today.   Discussions with Specialists or Other Care Team Provider: None    Education and Discussions with Family / Patient: Patient declined call to .     Current Length of Stay: 0 day(s)  Current Patient Status: Inpatient   Certification Statement: The patient will continue to require additional  inpatient hospital stay due to SOB  Discharge Plan: Anticipate discharge tomorrow to home.    Code Status: Level 1 - Full Code    Subjective   Patient was evaluated at bedside and found to be stable. Patient states he feels better but still gets SOB while walking, but not at rest. He states he couldn't sleep well last night due to noise.  Patient denies fever, chills, chest pain, n/v/d/c    Objective :  Temp:  [97.5 °F (36.4 °C)-98.3 °F (36.8 °C)] 97.7 °F (36.5 °C)  HR:  [62-87] 62  BP: (103-147)/(67-94) 103/70  Resp:  [16-20] 16  SpO2:  [93 %-97 %] 97 %  O2 Device: None (Room air)    Body mass index is 23.75 kg/m².     Input and Output Summary (last 24 hours):     Intake/Output Summary (Last 24 hours) at 1/7/2025 1704  Last data filed at 1/7/2025 1351  Gross per 24 hour   Intake 480 ml   Output 1550 ml   Net -1070 ml       Physical Exam  Constitutional:       General: He is not in acute distress.     Appearance: Normal appearance. He is normal weight. He is not ill-appearing or toxic-appearing.   HENT:      Head: Normocephalic and atraumatic.      Nose: Nose normal.      Mouth/Throat:      Mouth: Mucous membranes are moist.      Pharynx: Oropharynx is clear.   Eyes:      Conjunctiva/sclera: Conjunctivae normal.   Cardiovascular:      Rate and Rhythm: Normal rate. Rhythm irregular.      Pulses: Normal pulses.      Heart sounds: Normal heart sounds.   Pulmonary:      Effort: Pulmonary effort is normal. No respiratory distress.      Breath sounds: Normal breath sounds.   Abdominal:      General: Abdomen is flat. Bowel sounds are normal.      Palpations: Abdomen is soft.      Tenderness: There is no abdominal tenderness. There is no guarding or rebound.   Musculoskeletal:         General: Normal range of motion.      Cervical back: Normal range of motion and neck supple.      Right lower leg: No edema.      Left lower leg: No edema.   Skin:     General: Skin is warm and dry.   Neurological:      General: No focal  deficit present.      Mental Status: He is alert and oriented to person, place, and time. Mental status is at baseline.   Psychiatric:         Mood and Affect: Mood normal.         Behavior: Behavior normal.         Thought Content: Thought content normal.         Judgment: Judgment normal.           Lines/Drains:              Lab Results: I have reviewed the following results:   Results from last 7 days   Lab Units 01/07/25  0426   WBC Thousand/uL 6.18   HEMOGLOBIN g/dL 11.6*   HEMATOCRIT % 36.3*   PLATELETS Thousands/uL 137*   SEGS PCT % 58   LYMPHO PCT % 26   MONO PCT % 11   EOS PCT % 3     Results from last 7 days   Lab Units 01/07/25  0426   SODIUM mmol/L 132*   POTASSIUM mmol/L 4.2   CHLORIDE mmol/L 103   CO2 mmol/L 25   BUN mg/dL 30*   CREATININE mg/dL 1.28   ANION GAP mmol/L 4   CALCIUM mg/dL 8.2*   ALBUMIN g/dL 3.7   TOTAL BILIRUBIN mg/dL 0.42   ALK PHOS U/L 72   ALT U/L 15   AST U/L 23   GLUCOSE RANDOM mg/dL 99         Results from last 7 days   Lab Units 01/07/25  1628 01/07/25  1102 01/07/25  0817 01/06/25  2105 01/06/25  1116   POC GLUCOSE mg/dl 96 96 90 117 134               Recent Cultures (last 7 days):         Imaging Results Review: I personally reviewed the following image studies/reports in PACS and discussed pertinent findings with Radiology: chest xray. My interpretation of the radiology images/reports is: No acute cardiopulmonary disease. .  Other Study Results Review: EKG was reviewed.     Last 24 Hours Medication List:     Current Facility-Administered Medications:     albuterol (PROVENTIL HFA,VENTOLIN HFA) inhaler 2 puff, Q4H PRN    apixaban (ELIQUIS) tablet 5 mg, BID    aspirin chewable tablet 81 mg, Daily    diltiazem (CARDIZEM SR) 12 hr capsule 120 mg, Q12H SEDA    ferrous sulfate tablet 325 mg, Daily With Breakfast    fluticasone-vilanterol 200-25 mcg/actuation 1 puff, Daily    folic acid (FOLVITE) tablet 1,000 mcg, Daily    heparin (porcine) subcutaneous injection 5,000 Units, Q8H SEDA  **AND** [COMPLETED] Platelet count, Once    insulin lispro (HumALOG/ADMELOG) 100 units/mL subcutaneous injection 1-6 Units, 4x Daily (AC & HS) **AND** Fingerstick Glucose (POCT), 4x Daily AC and at bedtime    ipratropium-albuterol (DUO-NEB) 0.5-2.5 mg/3 mL inhalation solution 3 mL, 4x Daily    magnesium Oxide (MAG-OX) tablet 800 mg, BID    melatonin tablet 6 mg, HS PRN    metoprolol tartrate (LOPRESSOR) tablet 200 mg, Q12H SEDA    nicotine (NICODERM CQ) 21 mg/24 hr TD 24 hr patch 1 patch, Daily    ranolazine (RANEXA) 12 hr tablet 500 mg, Q12H SEDA    tamsulosin (FLOMAX) capsule 0.4 mg, Daily With Dinner    thiamine tablet 100 mg, Daily    Administrative Statements   Today, Patient Was Seen By: Nicky Messer, DO  I have spent a total time of 50 minutes in caring for this patient on the day of the visit/encounter including Diagnostic results, Prognosis, Risks and benefits of tx options, Instructions for management, Patient and family education, Importance of tx compliance, Risk factor reductions, Impressions, Counseling / Coordination of care, Documenting in the medical record, Reviewing / ordering tests, medicine, procedures  , and Obtaining or reviewing history  .

## 2025-01-07 NOTE — DISCHARGE SUMMARY
Discharge Summary - Family Medicine   Name: Gregg Partida 64 y.o. male I MRN: [id number]  Unit/Bed#: 3 [address] 322-02 I Date of Admission: 1/6/2025   Date of Service: 1/7/2025 I Hospital Day: 0    Medical Problems       Resolved Problems  Date Reviewed: 12/9/2024   None       Discharging Physician / Practitioner: Nicky Messer DO  PCP: Lilliana Bliss MD  Admission Date:   Admission Orders (From admission, onward)       Ordered        01/06/25 1247  Place in Observation  Once                          Discharge Date: 01/07/25    Consultations During Hospital Stay:  None    Procedures Performed:   None    Significant Findings / Test Results:   1/8: Na 130, Hgb 11.8  1/7: Na 132, Ca 8.2, Mg 1.7, Hgb 11.6, BNP 1041  1/6: Cr 1.95, Na 132, Hb 11.7, Trop 27>20, Mg 1.4    CXR 1/7:  No acute cardiopulmonary disease. Unchanged cardiomediastinal silhouette. Enlarged central pulmonary vasculature may represent pulmonary arterial hypertension.  CXR 1/6: No acute cardiopulmonary disease.    Incidental Findings:   None     Test Results Pending at Discharge (will require follow up):   None     Outpatient Tests Requested:  None    Things to follow up on:   1) Did you start taking the 1120 mg Cardizem SR at 8 am and 8 pm?  2) Did you start taking the 1.5 tablets of the 100 mg Lopressor at 9 am and 9 pm?  3) Have you been taking your Eliquis twice a day?  4) Did you decide to take the NaCl tablets or drink Ensures, to treat your hyponatremia?  5) Did you follow up with Cardiology about your possible procedure?  6) Did you follow up with Pulmonology about your shortness of breath with exertion?    Complications:  None    Reason for Admission: ENMA on CKD    Hospital Course:   Gregg Partida is a 64 y.o. male patient who originally presented to the hospital on 1/6/2025 due to SOB with exertion and generalized weakness.    Hospital Course: Patient is a 64 yr. old male w/ PMH of Afib, CKD, CAD, COPD, ETOH abuse, CHF, HLD, HTN, s/p CABG,  Prostate Ca s/p resection, and SUNITA, who presents w/ generalized weakness and SOB w/ exertion. In the ED, labs showed a Creatinine of 1.95, and his baseline Creatinine is 1, therefore patient was admitted with ENMA on CKD. CT scan head and C-spine were negative. CXR was negative for any acute processes. Patient's ENMA seemed to be due to dehydration from poor oral intake and multiple medications dropping his BP at once. Patient was given gentle hydration with 75 mg/hr IV NS and his sxs. resolved. Patient was still SOB while ambulating on the floor, so a repeat CXR was performed which showed no acute cardiopulmonary disease and enlarged central pulmonary vasculature possibly representing pulmonary arterial hypertension. Patient was desatting to 76 with ambulation therefore he was evaluated for home oxygen; for which he qualified. According to respiratory therapist, he needs up to 4L oxygen while walking. Case management were able to order his supplies for home oxygen. Patient was medically stable for discharge, and was sent home with his same meds, however his antihypertensives were scheduled differently. Patient was restarted on his home Eliquis during this admission, and instructed to continue taking them without fail until seeing Cardiology.    Dyspnea on exertion  Assessment & Plan  Patient came in with complaints of SOB with exertion. Patient is not on any oxygen at baseline, and isn't requiring any supplemental oxygen during this admission.  CXR: No acute cardiopulmonary disease.   ECHO: PAP 40 to 45 mm of hg   Walk study on room air showed desaturation to 76     STAT CXR: No acute cardiopulmonary disease. Unchanged cardiomediastinal silhouette. Enlarged central pulmonary vasculature may represent pulmonary arterial hypertension.  S/p 75 mL/hr IV NS discontinued   BNP: 1041    Plan:  F/u with Pulmonology outpatient    COPD, severity to be determined (HCC)  Assessment & Plan  Chronic. Stable. Patient reported  worsening SOB with exertion today. Patient is home on Breo Ellipta inhaler and Albuterol inhaler at home.   CXR 1/6: No acute cardiopulmonary disease.     Patient currently breathing fine on room air.    S/p home Breo ellipta inhaler  S/p home Albuterol inhaler  S/p Duo-neb inhalation soln.    Plan:  Continue home Breo ellipta inhaler  Continue home Albuterol inhaler     Generalized weakness  Assessment & Plan  Patient has been feeling unwell for the past week. He states he has not been eating or drinking much for the past week. Patient normally ambulates with a walker, but states that today he felt very short of breath with exertion. Patient denies n/v/d.     S/p 500mL IV NS bolus x 2 in ED  S/p 75 mL/hr of continuous IV NS  Repleted electrolytes as needed  S/p Orthostatics in place  S/p Fall precautions      Electrolyte abnormality  Assessment & Plan  Patient presents with hyponatremia and hypomagnesemia in the setting of ENMA on CKD. Patient admits to decreased PO intake and denies nausea, vomiting, and diarrhea.    Labs: Na 132, Mg 1.4    Repleted electrolytes as needed  S/p IVF hydration    Hypertension  Assessment & Plan  Chronic. Stable. Patient is home on Lopressor 200 mg Q12, Ranolazine 500 mg Q12, and Cardizem  mg Q12. Patient took all his meds this morning prior to coming to the ED.  Patient BP on admission was 88/51.     Plan:   Continue home Ranolazine, and Cardizem   Start taking 150 mg Lopressor BID    History of atrial fibrillation  Assessment & Plan  Chronic. Patient has had multiple hospitalizations due to Afib. Patient is home on 5 mg Eliquis BID, 120 mg Cardizem SR Q12, and 200 mg Lopressor Q12. Patient admits to being compliant with his Metoprolol, however he states he hasn't taken his Eliquis since his last hospital admission, due to a pending procedure.     EKG: A fib w/ a competing junctional pacemaker    Plan:  Start taking 150 mg Lopressor BID  Continue home Eliquis   Continue home  Cardizem  F/u with Cariology    Nicotine abuse  Assessment & Plan  Chronic. Stable. Smoke 1.5 packs x day. Patient states that he doesn't use his nicotine patch at home.    S/p Nicotine patch 21 mg / 24-hour     Plan:   F/u with PCP about smoking cessation     CAD (coronary artery disease)  Assessment & Plan  Patient has a h/o CAD s/p CABG. Patient is home on Aspirin 81 mg EC tablet daily, Ranolazine 500 mg Q12, and Eliquis 5 mg BID.    Plan:  Continue home Aspirin 81mg  Continue home Eliquis  Continue home Ranolazine     History of prostate cancer  Assessment & Plan  Chronic. Stable. PMH of prostate Ca in 2020, s/p resection in 2021. Home on 0.4 mg Flomax daily.     Plan:   Continue home Flomax    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 disease (HCC)  Assessment & Plan  Lab Results   Component Value Date    HGBA1C 5.2 11/14/2024       Recent Labs     01/07/25  2113 01/07/25  2245 01/08/25  0738 01/08/25  1150   POCGLU 122 130 183* 204*         Blood Sugar Average: Last 72 hrs:  (P) [id number]    Chronic. Stable. Patient is home on 500mg Metformin tabs daily w/ breakfast  Make sure to give Regular Diet during inpatient admissions (patient preference)     Held home Metformin   S/p ISS w/ accu checks    Plan:  Resume home Metformin     * Acute kidney injury superimposed on chronic kidney disease  (HCC)-resolved as of 1/8/2025  Assessment & Plan  Lab Results   Component Value Date    EGFR 63 01/08/2025    EGFR 59 01/07/2025    EGFR 35 01/06/2025    CREATININE 1.22 01/08/2025    CREATININE 1.28 01/07/2025    CREATININE 1.95 (H) 01/06/2025     Likely Prerenal ENMA from dehydration vs. Hypotension due to meds  Patient presents with Cr 1.95. Patient has a baseline Cr of 1. Patient states he has been feeling very weak lately, and becoming short of breath with exertion. Today he laid on the floor due to inability to catch his breath after exerting himself and EMS was called. Patient denies fall and  "LOC. Patient also admits to having decreased PO intake for the past week due to loss of appetite.      ED Course: 500 mL NS bolus x 2, 2g IV Mg SO4  CT head: No acute intracranial hemorrhage, significant mass effect or midline shift.   CT spine: No acute cervical spine fracture or traumatic malalignment.   CXR 1/6: No acute cardiopulmonary disease.     Labs: Cr 1.28    S/p 75 mL/hr IV NS   S/p 20 mg IV Lasix once       Please see above list of diagnoses and related plan for additional information.     Condition at Discharge: good    Discharge Day Visit / Exam:   Subjective:  Patient was evaluated at bedside and found to be stable. Patient states that he is feeling better and only mildly SOB while walking. He also states he hasn't been able to sleep well while I the hospital.  Patient denies chest pain, n/v/d     Vitals: Blood Pressure: 144/85 (01/08/25 1059)  Pulse: 60 (01/08/25 1059)  Temperature: 98.2 °F (36.8 °C) (01/08/25 1059)  Temp Source: Oral (01/08/25 1059)  Respirations: 18 (01/08/25 0101)  Height: 6' 2\" (188 cm) (01/06/25 1500)  Weight - Scale: 83.9 kg (185 lb) (01/06/25 1500)  SpO2: 97 % (01/08/25 1108)  Physical Exam  Constitutional:       General: He is not in acute distress.     Appearance: Normal appearance. He is normal weight. He is not ill-appearing or toxic-appearing.   HENT:      Head: Normocephalic and atraumatic.      Nose: Nose normal.      Mouth/Throat:      Mouth: Mucous membranes are moist.      Pharynx: Oropharynx is clear.   Eyes:      Conjunctiva/sclera: Conjunctivae normal.   Cardiovascular:      Rate and Rhythm: Normal rate. Rhythm irregular.      Pulses: Normal pulses.      Heart sounds: Normal heart sounds.   Pulmonary:      Effort: Pulmonary effort is normal. No respiratory distress.      Breath sounds: Normal breath sounds.   Abdominal:      General: Abdomen is flat. Bowel sounds are normal.      Palpations: Abdomen is soft.      Tenderness: There is no abdominal tenderness. There " is no guarding or rebound.   Musculoskeletal:         General: Normal range of motion.      Cervical back: Normal range of motion and neck supple.      Right lower leg: No edema.      Left lower leg: No edema.   Skin:     General: Skin is warm and dry.   Neurological:      General: No focal deficit present.      Mental Status: He is alert and oriented to person, place, and time. Mental status is at baseline.   Psychiatric:         Mood and Affect: Mood normal.         Behavior: Behavior normal.         Thought Content: Thought content normal.         Judgment: Judgment normal.          Discussion with Family: Patient declined call to .     Discharge instructions/Information to patient and family:   See after visit summary for information provided to patient and family.      Provisions for Follow-Up Care:  See after visit summary for information related to follow-up care and any pertinent home health orders.      Mobility at time of Discharge:   Basic Mobility Inpatient Raw Score: 12  JH-HLM Goal: 4: Move to chair/commode  JH-HLM Achieved: 6: Walk 10 steps or more  HLM Goal achieved. Continue to encourage appropriate mobility.     Disposition:   Home    Planned Readmission: Likely    Discharge Medications:  See after visit summary for reconciled discharge medications provided to patient and/or family.      Administrative Statements   Discharge Statement:  I have spent a total time of 45 minutes in caring for this patient on the day of the visit/encounter. >30 minutes of time was spent on: Diagnostic results, Prognosis, Instructions for management, Patient and family education, Importance of tx compliance, Risk factor reductions, Impressions, Counseling / Coordination of care, Documenting in the medical record, and Reviewing / ordering tests, medicine, procedures  .     and Reviewing / ordering tests, medicine, procedures  .

## 2025-01-07 NOTE — ASSESSMENT & PLAN NOTE
Patient has been feeling unwell for the past week. He states he has not been eating or drinking much for the past week. Patient normally ambulates with a walker, but states that today he felt very short of breath with exertion. Patient denies n/v/d.     Plan:   S/p 500mL IV NS bolus x 2 in ED  Dc'ed 75 mL/hr of continuous IV NS  Replete electrolytes as needed  Orthostatics in place  Fall precautions  Consider PT/OT eval   Continue to monitor

## 2025-01-07 NOTE — DISCHARGE INSTR - AVS FIRST PAGE
Start taking your 120 mg Cardizem SR at 8am and at 8 pm    Start taking your 100 mg Lopressor, 1.5 tablets at 9 am, and 1.5 tablets at 9pm (so 150mg twice a day)     Continue taking your 5mg Eliquis, 2 times daily    Start using oxygen when you walk or exert yourself at home    Follow up with your PCP within 14 days of your discharge from the hospital     Follow up with your cardiologist about the procedure you need done, and DON'T stop taking your Eliquis until they specifically tell you when to    Take 2 of your 1g sodium chloride tablets, twice daily OR drink 1 can of Ensure twice daily, to fix your low blood sodium    Restrict your fluid intake to 1800 mL daily    Follow up with pulmonology outpatient for further investigation of your shortness of breath    Stop smoking and drinking alcohol

## 2025-01-07 NOTE — ASSESSMENT & PLAN NOTE
Chronic. Stable. Patient reported worsening SOB with exertion today. Patient is home on Breo Ellipta inhaler and Albuterol inhaler at home.   CXR 1/6: No acute cardiopulmonary disease.     Patient currently breathing fine on room air.    Plan:  Continue home Breo ellipta inhaler  Continue home Albuterol inhaler  Duo-neb inhalation soln.

## 2025-01-07 NOTE — HOSPITAL COURSE
Patient is a 64 yr. old male w/ PMH of Afib, CKD, CAD, COPD, ETOH abuse, CHF, HLD, HTN, s/p CABG, Prostate Ca s/p resection, and SUNITA, who presents w/ generalized weakness and SOB w/ exertion. In the ED, labs showed a Creatinine of 1.95, and his baseline Creatinine is 1, therefore patient was admitted with ENMA on CKD. CT scan head and C-spine were negative. CXR was negative for any acute processes. Patient's ENMA seemed to be due to dehydration from poor oral intake and multiple medications dropping his BP at once. Patient was given gentle hydration with 75 mg/hr IV NS and his sxs. resolved. Patient was still SOB while ambulating on the floor, so a repeat CXR was performed which showed no acute cardiopulmonary disease and enlarged central pulmonary vasculature possibly representing pulmonary arterial hypertension. Patient was desatting to 76 with ambulation therefore he was evaluated for home oxygen; for which he qualified. According to respiratory therapist, he needs up to 4L oxygen while walking. Case management were able to order his supplies for home oxygen. Patient was medically stable for discharge, and was sent home with his same meds, however his antihypertensives were scheduled differently. Patient was restarted on his home Eliquis during this admission, and instructed to continue taking them without fail until seeing Cardiology.

## 2025-01-07 NOTE — UTILIZATION REVIEW
Initial Clinical Review    Observation 1/6/25 @ 1247 converted to inpatient admission 1/7/25 @ 1628 for continued care & tx for ENMA on CKD.    Admission: Date/Time/Statement:   Admission Orders (From admission, onward)       Ordered        01/07/25 1628  INPATIENT ADMISSION  Once            01/06/25 1247  Place in Observation  Once                          Orders Placed This Encounter   Procedures    INPATIENT ADMISSION     Standing Status:   Standing     Number of Occurrences:   1     Level of Care:   Med Surg [16]     Estimated length of stay:   More than 2 Midnights     Certification:   I certify that inpatient services are medically necessary for this patient for a duration of greater than two midnights. See H&P and MD Progress Notes for additional information about the patient's course of treatment.     ED Arrival Information       Expected   -    Arrival   1/6/2025 11:04    Acuity   Urgent              Means of arrival   Ambulance    Escorted by   [facility]    Admission type   Emergency              Arrival complaint   weakness             Chief Complaint   Patient presents with    Weakness - Generalized     Pt. brought in by squad for weakness, pt. Was at store at felt lightheaded.       Initial Presentation:   64 yom to ER from home via EMS for lightheadedness while at store. Hx alcoholic cardiomyopathy atrial fibrillation currently on several different antihypertensives; reports stopped taking Eliquis. Presents hypotensive, SOB, lightheaded. Admission CT head & cervical spine neg.  Labs: Hgb 11.7, Na 132, BUN 28, Cr 1.95, Mg 1.4, Lipase 103.  Placed in observation status for ENMA on CKD. Plan: IVF, telemetry,   Accuchecks, orthostatic BP.    Observation to IP admission 1/7/25:  ENMA POA, Cr improved to 1.28, trial off IVF. CXR showing enlarged pulmonary vasculature. IV Lasix x1 given, monitor response. Continue to monitor respiratory status, output, VS, follow labs/lytes.    ED  Treatment-Medication Administration from 01/06/2025 1104 to 01/06/2025 1501         Date/Time Order Dose Route Action     01/06/2025 1125 sodium chloride 0.9 % bolus 500 mL 500 mL Intravenous New Bag     01/06/2025 1253 magnesium sulfate 2 g/50 mL IVPB (premix) 2 g 2 g Intravenous New Bag     01/06/2025 1305 sodium chloride 0.9 % bolus 500 mL 500 mL Intravenous New Bag            Scheduled Medications:  Medications 12/30 12/31 01/01 01/02 01/03 01/04 01/05 01/06 01/07 01/08   apixaban (ELIQUIS) tablet 5 mg  Dose: 5 mg  Freq: 2 times daily Route: PO  Start: 01/06/25 1800   Admin Instructions:      Order specific questions:              ([phone] [phone]        aspirin chewable tablet 81 mg  Dose: 81 mg  Freq: Daily Route: PO  Start: 01/07/25 0900            0936      1140        calcium carbonate (TUMS) chewable tablet 500 mg  Dose: 500 mg  Freq: Once Route: PO  Start: 01/07/25 0645 End: 01/07/25 0936   Admin Instructions:               0976         diltiazem (CARDIZEM SR) 12 hr capsule 120 mg  Dose: 120 mg  Freq: Every 12 hours scheduled Route: PO  Start: 01/07/25 0900   Admin Instructions:      Order specific questions:               [phone] 7605     2100        ferrous sulfate tablet 325 mg  Dose: 325 mg  Freq: Daily with breakfast Route: PO  Start: 01/07/25 0730   Admin Instructions:               0936      1140        fluticasone-vilanterol 200-25 mcg/actuation 1 puff  Dose: 1 puff  Freq: Daily Route: IN  Start: 01/06/25 1445   Admin Instructions:              ([phone] [phone]        folic acid (FOLVITE) tablet 1,000 mcg  Dose: 1,000 mcg  Freq: Daily Route: PO  Start: 01/07/25 0900            0936      1140        furosemide (LASIX) injection 20 mg  Dose: 20 mg  Freq: Once Route: IV  Start: 01/07/25 1845 End: 01/07/25 2126   Admin Instructions:      Order specific questions:               2126     2130) [C]          heparin (porcine) subcutaneous injection 5,000  Units  Dose: 5,000 Units  Freq: Every 8 hours scheduled Route: SC  Start: 01/06/25 1400   Admin Instructions:              (9270) [phone] 6306 8654 7444     1400     2200         insulin lispro (HumALOG/ADMELOG) 100 units/mL subcutaneous injection 1-6 Units  Dose: 1-6 Units  Freq: 4 times daily (before meals and at bedtime) Route: SC  Start: 01/06/25 1600   Admin Instructions:              (8630)     (7288)      (3424)     (1202)     (1717)      (0045)     (1151) [C]     1152     1600     2200        ipratropium-albuterol (DUO-NEB) 0.5-2.5 mg/3 mL inhalation solution 3 mL  Dose: 3 mL  Freq: 4 times daily (RESP) Route: NEBULIZATION  Start: 01/07/25 1700                 2027) 5459 1854     1600     2000        magnesium Oxide (MAG-OX) tablet 800 mg  Dose: 800 mg  Freq: 2 times daily Route: PO  Start: 01/07/25 0900            0988 5807      1149     1800        magnesium sulfate 2 g/50 mL IVPB (premix) 2 g  Dose: 2 g  Freq: Once Route: IV  Last Dose: 2 g (01/07/25 0954)  Start: 01/07/25 0645 End: 01/07/25 1154   Admin Instructions:               0909         magnesium sulfate 2 g/50 mL IVPB (premix) 2 g  Dose: 2 g  Freq: Once Route: IV  Last Dose: Stopped (01/06/25 1400)  Start: 01/06/25 1200 End: 01/06/25 1400   Admin Instructions:              1256     1400          metoprolol tartrate (LOPRESSOR) tablet 200 mg  Dose: 200 mg  Freq: Every 12 hours scheduled Route: PO  Start: 01/07/25 0900   Admin Instructions:      Order specific questions:               [phone] 2684        nicotine (NICODERM CQ) 21 mg/24 hr TD 24 hr patch 1 patch  Dose: 1 patch  Freq: Daily Route: TD  Start: 01/06/25 1400   Admin Instructions:              (3916) 1422 5176     1140        ranolazine (RANEXA) 12 hr tablet 500 mg  Dose: 500 mg  Freq: Every 12 hours scheduled Route: PO  Start: 01/07/25 0900   Admin Instructions:               5741 1192 4013     2100         sodium chloride 0.9 % bolus 500 mL  Dose: 500 mL  Freq: Once Route: IV  Last Dose: 500 mL (01/06/25 1305)  Start: 01/06/25 1215 End: 01/06/25 1405           1305          sodium chloride 0.9 % bolus 500 mL  Dose: 500 mL  Freq: Once Route: IV  Last Dose: 500 mL (01/06/25 1125)  Start: 01/06/25 1115 End: 01/06/25 1225           1125          sodium chloride tablet 2 g  Dose: 2 g  Freq: Once Route: PO  Start: 01/08/25 0930             (1140)        tamsulosin (FLOMAX) capsule 0.4 mg  Dose: 0.4 mg  Freq: Daily with dinner Route: PO  Start: 01/07/25 1630   Admin Instructions:               1721      1630        thiamine tablet 100 mg  Dose: 100 mg  Freq: Daily Route: PO  Start: 01/07/25 0900            0936      1140                    Continuous Meds Sorted by Name  for Gregg Partida as of 12/30/24 through 1/8/25  Legend:       Medications 12/30 12/31 01/01 01/02 01/03 01/04 01/05 01/06 01/07 01/08   sodium chloride 0.9 % infusion  Rate: 75 mL/hr Dose: 75 mL/hr  Freq: Continuous Route: IV  Indications of Use: IV Hydration  Last Dose: Stopped (01/07/25 1456)  Start: 01/06/25 1400 End: 01/07/25 1423           1814      0646     1423-D/C'd  1456 [C]         Legend:       [id number]/0101/0201/0301/0401/0501/0601/0701/08        PRN Meds Sorted by Name  for Gregg Partida as of 12/30/24 through 1/8/25  Legend:       Medications 12/30 12/31 01/01 01/02 01/03 01/04 01/05 01/06 01/07 01/08   albuterol (PROVENTIL HFA,VENTOLIN HFA) inhaler 2 puff  Dose: 2 puff  Freq: Every 4 hours PRN Route: IN  PRN Reason: wheezing  Start: 01/06/25 1353   Admin Instructions:                   melatonin tablet 6 mg  Dose: 6 mg  Freq: Daily at bedtime PRN Route: PO  PRN Reason: insomnia  Start: 01/06/25 2116            0019                         ED Triage Vitals   Temperature Pulse Respirations Blood Pressure SpO2 Pain Score   01/06/25 1109 01/06/25 1109 01/06/25 1109 01/06/25 1109 01/06/25 1109 01/06/25 1752   98.4 °F (36.9  °C) 60 18 (!) 88/51 95 % No Pain     Weight (last 2 days)       Date/Time Weight    01/06/25 1500 83.9 (185)            Vital Signs (last 3 days)       Date/Time Temp Pulse Resp BP MAP (mmHg) SpO2 O2 Device Patient Position - Orthostatic VS Joseph Coma Scale Score Pain    01/08/25 1346 -- -- -- -- -- 96 % None (Room air) -- 15 No Pain    01/08/25 1108 -- -- -- -- -- 97 % -- -- -- --    01/08/25 1059 98.2 °F (36.8 °C) 60 -- 144/85 106 91 % None (Room air) Lying -- --    01/08/25 0727 -- -- -- -- -- 97 % None (Room air) -- -- --    01/08/25 0101 97 °F (36.1 °C) 71 18 118/82 93 97 % -- Lying -- --    01/07/25 2248 -- 64 -- 103/69 82 -- -- Sitting -- --    01/07/25 2126 -- 63 18 135/83 104 -- -- -- -- No Pain    01/07/25 2100 -- -- -- -- -- -- -- -- 15 --    01/07/25 1855 -- -- -- 104/70 82 -- -- Lying -- --    01/07/25 1552 -- 62 16 103/70 82 97 % None (Room air) Lying -- --    01/07/25 0936 97.7 °F (36.5 °C) 85 18 140/82 102 95 % None (Room air) Sitting -- --    01/07/25 0730 -- -- -- -- -- -- -- -- -- No Pain    01/07/25 0320 98.3 °F (36.8 °C) 80 19 147/94 115 93 % None (Room air) Lying -- --    01/07/25 0100 -- -- -- -- -- -- -- -- 15 No Pain    01/06/25 2350 98.3 °F (36.8 °C) 87 20 123/76 95 95 % None (Room air) Lying -- --    01/06/25 1813 97.5 °F (36.4 °C) 82 18 107/67 82 97 % None (Room air) Lying -- --    01/06/25 1752 -- -- -- -- -- -- -- -- -- No Pain    01/06/25 1500 97.9 °F (36.6 °C) 82 22 115/56 76 99 % -- Sitting -- --    01/06/25 1315 -- 73 22 108/72 86 98 % None (Room air) Lying -- --    01/06/25 1300 -- 74 21 114/61 82 98 % None (Room air) Lying -- --    01/06/25 1245 -- 74 21 119/64 86 93 % -- -- -- --    01/06/25 1230 -- 74 21 104/74 85 94 % None (Room air) Lying -- --    01/06/25 1215 -- 70 22 103/68 81 98 % None (Room air) Lying -- --    01/06/25 1200 -- 70 21 96/58 72 96 % None (Room air) Lying -- --    01/06/25 1141 -- -- -- -- -- -- -- -- 15 --    01/06/25 1109 98.4 °F (36.9 °C) 60 18 88/51 64  95 % None (Room air) Lying -- --              Pertinent Labs/Diagnostic Test Results:   Radiology:  XR chest portable   Final Interpretation by Timothy Zaragoza MD (01/07 1648)      No acute cardiopulmonary disease.      Unchanged cardiomediastinal silhouette.      Enlarged central pulmonary vasculature may represent pulmonary arterial hypertension.      Workstation performed: [id number]         XR chest portable   Final Interpretation by Jasson Forde MD (01/07 0934)      No acute cardiopulmonary disease.            Workstation performed: [id number]         CT head without contrast   Final Interpretation by Lionel Lopez MD (01/06 1213)      No acute intracranial hemorrhage, significant mass effect or midline shift.                  Workstation performed: [id number]         CT spine cervical without contrast   Final Interpretation by Lionel Lopez MD (01/06 1220)      No acute cervical spine fracture or traumatic malalignment.      Postsurgical changes discussed above.                  Workstation performed: [id number]           Cardiology:  ECG 12 lead   Final Result by Chris Phillips MD (01/07 1705)   Atrial fibrillation with a competing junctional pacemaker   Nonspecific ST abnormality   Abnormal ECG   Confirmed by Chris Phillips ([id number]) on 1/7/2025 5:05:37 PM        GI:  No orders to display           Results from last 7 days   Lab Units 01/08/25  0556 01/07/25  0426 01/06/25  1432 01/06/25  1122   WBC Thousand/uL 6.87 6.18  --  8.20   HEMOGLOBIN g/dL 11.8* 11.6*  --  11.7*   HEMATOCRIT % 36.8 36.3*  --  35.9*   PLATELETS Thousands/uL 135* 137* 138* 170   TOTAL NEUT ABS Thousands/µL 3.79 3.63  --  5.26         Results from last 7 days   Lab Units 01/08/25  0556 01/07/25  0426 01/06/25  1122   SODIUM mmol/L 130* 132* 132*   POTASSIUM mmol/L 5.0 4.2 4.4   CHLORIDE mmol/L 102 103 98   CO2 mmol/L 24 25 25   ANION GAP mmol/L 4 4 9   BUN mg/dL 34* 30* 28*   CREATININE mg/dL 1.22 1.28 1.95*   EGFR  ml/min/1.73sq m 63 59 35   CALCIUM mg/dL 8.4 8.2* 9.0   MAGNESIUM mg/dL 2.1 1.7* 1.4*     Results from last 7 days   Lab Units 01/07/25  0426 01/06/25  1122   AST U/L 23 20   ALT U/L 15 15   ALK PHOS U/L 72 78   TOTAL PROTEIN g/dL 7.0 7.2   ALBUMIN g/dL 3.7 3.9   TOTAL BILIRUBIN mg/dL 0.42 0.61     Results from last 7 days   Lab Units 01/08/25  1150 01/08/25  0738 01/07/25  2245 01/07/25  2113 01/07/25  1628 01/07/25  1102 01/07/25  0817 01/06/25  2105 01/06/25  1116   POC GLUCOSE mg/dl 204* 183* 130 122 96 96 90 117 134     Results from last 7 days   Lab Units 01/08/25  0556 01/07/25  0426 01/06/25  1122   GLUCOSE RANDOM mg/dL 104 99 123             BETA-HYDROXYBUTYRATE   Date Value Ref Range Status   01/22/2024 3.2 (H) <0.6 mmol/L Final                      Results from last 7 days   Lab Units 01/06/25  1432 01/06/25  1122   HS TNI 0HR ng/L  --  27   HS TNI 2HR ng/L 20  --    HSTNI D2 ng/L -7  --              Results from last 7 days   Lab Units 01/06/25  1122   TSH 3RD GENERATON uIU/mL 2.047                     Results from last 7 days   Lab Units 01/07/25  1621   BNP pg/mL 1,041*                     Results from last 7 days   Lab Units 01/06/25  1122   LIPASE u/L 103*                                                                   Past Medical History:   Diagnosis Date    Acute on chronic kidney failure  (HCC)     Alcohol withdrawal (HCC) 06/07/2019    Atrial fibrillation (HCC)     Cancer (HCC)     prostate ca,had radiation    Cardiac disease     stents,then triple bypass    COPD (chronic obstructive pulmonary disease) (HCC)     Coronary artery disease     ETOH abuse     Heart failure (HCC)     History of heart surgery     says [facility] bypass [facility]    Hx of heart artery stent     2014    Hyperlipidemia     Hypertension     Hyponatremia 10/17/2019    Hypovolemic shock (HCC) 12/22/2019    Lumbar spondylitis (HCC) 10/13/2022    Metabolic acidosis, increased anion gap 04/21/2021    Nasal bone fracture  10/10/2022    Prostate CA (HCC)     S/P CABG x 3     2004    Sleep apnea      Present on Admission:   Acute kidney injury superimposed on chronic kidney disease  (HCC)   Nicotine abuse   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 disease (HCC)   Generalized weakness   Electrolyte abnormality   CAD (coronary artery disease)   Hypertension   COPD, severity to be determined (HCC)      Admitting Diagnosis: Dehydration [E86.0]  Weakness generalized [R53.1]  Weakness [R53.1]  Near syncope [R55]  Acute kidney injury (HCC) [N17.9]  Age/Sex: 64 y.o. male    Network Utilization Review Department  ATTENTION: Please call with any questions or concerns to [phone] and carefully listen to the prompts so that you are directed to the right person. All voicemails are confidential.   For Discharge needs, contact Care Management DC Support Team at [phone] opt. [phone]  Send all requests for admission clinical reviews, approved or denied determinations and any other requests to dedicated fax number below belonging to the campus where the patient is receiving treatment. List of dedicated fax numbers for the Facilities:  FACILITY NAME UR FAX NUMBER   ADMISSION DENIALS (Administrative/Medical Necessity) [phone]   DISCHARGE SUPPORT TEAM (NETWORK) [phone]   PARENT CHILD HEALTH (Maternity/NICU/Pediatrics) [phone]   [facility] [phone]   [facility] [phone]   [facility] [phone]   [facility] [phone]   [facility] [phone]   [facility] [phone]   [facility] [phone]   [facility] [phone]   [facility] [phone]   [facility] [phone]   [facility] [phone]   [facility]  [facility] [phone]

## 2025-01-07 NOTE — ASSESSMENT & PLAN NOTE
Patient presents with hyponatremia and hypomagnesemia in the setting of ENMA on CKD. Patient admits to decreased PO intake and denies nausea, vomiting, and diarrhea.    Labs: Na 132, Mg 1.4    Plan:   Replete electrolytes as needed  S/p IVF hydration   Continue to monitor labs

## 2025-01-07 NOTE — ASSESSMENT & PLAN NOTE
Patient came in with complaints of SOB with exertion. Patient is not on any oxygen at baseline, and isn't requiring any supplemental oxygen during this admission.  CXR: No acute cardiopulmonary disease.   ECHO: PAP 40 to 45 mm of hg   Walk study on room air showed desaturation to 76     Plan:   STAT CXR: No acute cardiopulmonary disease. Unchanged cardiomediastinal silhouette. Enlarged central pulmonary vasculature may represent pulmonary arterial hypertension.  75 mL/hr IV NS discontinued   BNP ordered  Continue to monitor

## 2025-01-07 NOTE — PLAN OF CARE
Problem: Prexisting or High Potential for Compromised Skin Integrity  Goal: Skin integrity is maintained or improved  Description: INTERVENTIONS:  - Identify patients at risk for skin breakdown  - Assess and monitor skin integrity  - Assess and monitor nutrition and hydration status  - Monitor labs   - Assess for incontinence   - Turn and reposition patient  - Assist with mobility/ambulation  - Relieve pressure over bony prominences  - Avoid friction and shearing  - Provide appropriate hygiene as needed including keeping skin clean and dry  - Evaluate need for skin moisturizer/barrier cream  - Collaborate with interdisciplinary team   - Patient/family teaching  - Consider wound care consult   Outcome: Progressing     Problem: PAIN - ADULT  Goal: Verbalizes/displays adequate comfort level or baseline comfort level  Description: Interventions:  - Encourage patient to monitor pain and request assistance  - Assess pain using appropriate pain scale  - Administer analgesics based on type and severity of pain and evaluate response  - Implement non-pharmacological measures as appropriate and evaluate response  - Consider cultural and social influences on pain and pain management  - Notify physician/advanced practitioner if interventions unsuccessful or patient reports new pain  Outcome: Progressing     Problem: INFECTION - ADULT  Goal: Absence or prevention of progression during hospitalization  Description: INTERVENTIONS:  - Assess and monitor for signs and symptoms of infection  - Monitor lab/diagnostic results  - Monitor all insertion sites, i.e. indwelling lines, tubes, and drains  - Monitor endotracheal if appropriate and nasal secretions for changes in amount and color  - [place] appropriate cooling/warming therapies per order  - Administer medications as ordered  - Instruct and encourage patient and family to use good hand hygiene technique  - Identify and instruct in appropriate isolation precautions for  identified infection/condition  Outcome: Progressing  Goal: Absence of fever/infection during neutropenic period  Description: INTERVENTIONS:  - Monitor WBC    Outcome: Progressing     Problem: SAFETY ADULT  Goal: Patient will remain free of falls  Description: INTERVENTIONS:  - Educate patient/family on patient safety including physical limitations  - Instruct patient to call for assistance with activity   - Consult OT/PT to assist with strengthening/mobility   - Keep Call bell within reach  - Keep bed low and locked with side rails adjusted as appropriate  - Keep care items and personal belongings within reach  - Initiate and maintain comfort rounds  - Make Fall Risk Sign visible to staff  - Offer Toileting every 2 Hours, in advance of need  - Initiate/Maintain bed/chair alarm  - Obtain necessary fall risk management equipment: bracelet/socks   - Apply yellow socks and bracelet for high fall risk patients  - Consider moving patient to room near nurses station  Outcome: Progressing  Goal: Maintain or return to baseline ADL function  Description: INTERVENTIONS:  -  Assess patient's ability to carry out ADLs; assess patient's baseline for ADL function and identify physical deficits which impact ability to perform ADLs (bathing, care of mouth/teeth, toileting, grooming, dressing, etc.)  - Assess/evaluate cause of self-care deficits   - Assess range of motion  - Assess patient's mobility; develop plan if impaired  - Assess patient's need for assistive devices and provide as appropriate  - Encourage maximum independence but intervene and supervise when necessary  - Involve family in performance of ADLs  - Assess for home care needs following discharge   - Consider OT consult to assist with ADL evaluation and planning for discharge  - Provide patient education as appropriate  Outcome: Progressing  Goal: Maintains/Returns to pre admission functional level  Description: INTERVENTIONS:  - Perform AM-PAC 6 Click Basic  Mobility/ Daily Activity assessment daily.  - Set and communicate daily mobility goal to care team and patient/family/caregiver.   - Collaborate with rehabilitation services on mobility goals if consulted  - Perform Range of Motion 12 times a day.  - Reposition patient every 2 hours.  - Dangle patient 3 times a day  - Stand patient 3 times a day  - Ambulate patient 3 times a day  - Out of bed to chair 3 times a day   - Out of bed for meals 3 times a day  - Out of bed for toileting  - Record patient progress and toleration of activity level   Outcome: Progressing     Problem: DISCHARGE PLANNING  Goal: Discharge to home or other facility with appropriate resources  Description: INTERVENTIONS:  - Identify barriers to discharge w/patient and caregiver  - Arrange for needed discharge resources and transportation as appropriate  - Identify discharge learning needs (meds, wound care, etc.)  - Arrange for interpretive services to assist at discharge as needed  - Refer to Case Management Department for coordinating discharge planning if the patient needs post-hospital services based on physician/advanced practitioner order or complex needs related to functional status, cognitive ability, or social support system  Outcome: Progressing     Problem: Knowledge Deficit  Goal: Patient/family/caregiver demonstrates understanding of disease process, treatment plan, medications, and discharge instructions  Description: Complete learning assessment and assess knowledge base.  Interventions:  - Provide teaching at level of understanding  - Provide teaching via preferred learning methods  Outcome: Progressing     Problem: CARDIOVASCULAR - ADULT  Goal: Maintains optimal cardiac output and hemodynamic stability  Description: INTERVENTIONS:  - Monitor I/O, vital signs and rhythm  - Monitor for S/S and trends of decreased cardiac output  - Administer and titrate ordered vasoactive medications to optimize hemodynamic stability  - Assess  quality of pulses, skin color and temperature  - Assess for signs of decreased coronary artery perfusion  - Instruct patient to report change in severity of symptoms  Outcome: Progressing  Goal: Absence of cardiac dysrhythmias or at baseline rhythm  Description: INTERVENTIONS:  - Continuous cardiac monitoring, vital signs, obtain 12 lead EKG if ordered  - Administer antiarrhythmic and heart rate control medications as ordered  - Monitor electrolytes and administer replacement therapy as ordered  Outcome: Progressing     Problem: METABOLIC, FLUID AND ELECTROLYTES - ADULT  Goal: Electrolytes maintained within normal limits  Description: INTERVENTIONS:  - Monitor labs and assess patient for signs and symptoms of electrolyte imbalances  - Administer electrolyte replacement as ordered  - Monitor response to electrolyte replacements, including repeat lab results as appropriate  - Instruct patient on fluid and nutrition as appropriate  Outcome: Progressing  Goal: Fluid balance maintained  Description: INTERVENTIONS:  - Monitor labs   - Monitor I/O and WT  - Instruct patient on fluid and nutrition as appropriate  - Assess for signs & symptoms of volume excess or deficit  Outcome: Progressing  Goal: Glucose maintained within target range  Description: INTERVENTIONS:  - Monitor Blood Glucose as ordered  - Assess for signs and symptoms of hyperglycemia and hypoglycemia  - Administer ordered medications to maintain glucose within target range  - Assess nutritional intake and initiate nutrition service referral as needed  Outcome: Progressing      Problem: MUSCULOSKELETAL - ADULT  Goal: Maintain or return mobility to safest level of function  Description: INTERVENTIONS:  - Assess patient's ability to carry out ADLs; assess patient's baseline for ADL function and identify physical deficits which impact ability to perform ADLs (bathing, care of mouth/teeth, toileting, grooming, dressing, etc.)  - Assess/evaluate cause of self-care  deficits   - Assess range of motion  - Assess patient's mobility  - Assess patient's need for assistive devices and provide as appropriate  - Encourage maximum independence but intervene and supervise when necessary  - Involve family in performance of ADLs  - Assess for home care needs following discharge   - Consider OT consult to assist with ADL evaluation and planning for discharge  - Provide patient education as appropriate  Outcome: Progressing  Goal: Maintain proper alignment of affected body part  Description: INTERVENTIONS:  - Support, maintain and protect limb and body alignment  - Provide patient/ family with appropriate education  Outcome: Progressing

## 2025-01-07 NOTE — ASSESSMENT & PLAN NOTE
Lab Results   Component Value Date    EGFR 59 01/07/2025    EGFR 35 01/06/2025    EGFR 59 12/12/2024    CREATININE 1.28 01/07/2025    CREATININE 1.95 (H) 01/06/2025    CREATININE 1.28 12/12/2024     Likely Prerenal ENMA from dehydration vs. Hypotension due to meds  Patient presents with Cr 1.95. Patient has a baseline Cr of 1. Patient states he has been feeling very weak lately, and becoming short of breath with exertion. Today he laid on the floor due to inability to catch his breath after exerting himself and EMS was called. Patient denies fall and LOC. Patient also admits to having decreased PO intake for the past week due to loss of appetite.      ED Course: 500 mL NS bolus x 2, 2g IV Mg SO4  CT head: No acute intracranial hemorrhage, significant mass effect or midline shift.   CT spine: No acute cervical spine fracture or traumatic malalignment.   CXR 1/6: No acute cardiopulmonary disease.     Labs: Cr 1.28    Plan:   Dc'ed 75 mL/hr IV NS   BNP ordered  Replete electrolytes as needed   Monitor CMP  Avoid nephrotoxic agents

## 2025-01-08 VITALS
BODY MASS INDEX: 23.74 KG/M2 | WEIGHT: 185 LBS | HEIGHT: 74 IN | TEMPERATURE: 98.2 F | OXYGEN SATURATION: 96 % | HEART RATE: 60 BPM | SYSTOLIC BLOOD PRESSURE: 144 MMHG | DIASTOLIC BLOOD PRESSURE: 85 MMHG | RESPIRATION RATE: 18 BRPM

## 2025-01-08 PROBLEM — N18.9 ACUTE KIDNEY INJURY SUPERIMPOSED ON CHRONIC KIDNEY DISEASE  (HCC): Status: RESOLVED | Noted: 2024-02-10 | Resolved: 2025-01-08

## 2025-01-08 PROBLEM — N17.9 ACUTE KIDNEY INJURY SUPERIMPOSED ON CHRONIC KIDNEY DISEASE  (HCC): Status: RESOLVED | Noted: 2024-02-10 | Resolved: 2025-01-08

## 2025-01-08 LAB
ANION GAP SERPL CALCULATED.3IONS-SCNC: 4 MMOL/L (ref 4–13)
BASOPHILS # BLD AUTO: 0.1 THOUSANDS/ΜL (ref 0–0.1)
BASOPHILS NFR BLD AUTO: 2 % (ref 0–1)
BUN SERPL-MCNC: 34 MG/DL (ref 5–25)
CALCIUM SERPL-MCNC: 8.4 MG/DL (ref 8.4–10.2)
CHLORIDE SERPL-SCNC: 102 MMOL/L (ref 96–108)
CO2 SERPL-SCNC: 24 MMOL/L (ref 21–32)
CREAT SERPL-MCNC: 1.22 MG/DL (ref 0.6–1.3)
DME PARACHUTE DELIVERY DATE ACTUAL: NORMAL
DME PARACHUTE DELIVERY DATE EXPECTED: NORMAL
DME PARACHUTE DELIVERY DATE REQUESTED: NORMAL
DME PARACHUTE ITEM DESCRIPTION: NORMAL
DME PARACHUTE ORDER STATUS: NORMAL
DME PARACHUTE SUPPLIER NAME: NORMAL
DME PARACHUTE SUPPLIER PHONE: NORMAL
EOSINOPHIL # BLD AUTO: 0.13 THOUSAND/ΜL (ref 0–0.61)
EOSINOPHIL NFR BLD AUTO: 2 % (ref 0–6)
ERYTHROCYTE [DISTWIDTH] IN BLOOD BY AUTOMATED COUNT: 14.6 % (ref 11.6–15.1)
GFR SERPL CREATININE-BSD FRML MDRD: 63 ML/MIN/1.73SQ M
GLUCOSE SERPL-MCNC: 104 MG/DL (ref 65–140)
GLUCOSE SERPL-MCNC: 183 MG/DL (ref 65–140)
GLUCOSE SERPL-MCNC: 204 MG/DL (ref 65–140)
HCT VFR BLD AUTO: 36.8 % (ref 36.5–49.3)
HGB BLD-MCNC: 11.8 G/DL (ref 12–17)
IMM GRANULOCYTES # BLD AUTO: 0.03 THOUSAND/UL (ref 0–0.2)
IMM GRANULOCYTES NFR BLD AUTO: 0 % (ref 0–2)
LYMPHOCYTES # BLD AUTO: 1.95 THOUSANDS/ΜL (ref 0.6–4.47)
LYMPHOCYTES NFR BLD AUTO: 28 % (ref 14–44)
MAGNESIUM SERPL-MCNC: 2.1 MG/DL (ref 1.9–2.7)
MCH RBC QN AUTO: 30.8 PG (ref 26.8–34.3)
MCHC RBC AUTO-ENTMCNC: 32.1 G/DL (ref 31.4–37.4)
MCV RBC AUTO: 96 FL (ref 82–98)
MONOCYTES # BLD AUTO: 0.87 THOUSAND/ΜL (ref 0.17–1.22)
MONOCYTES NFR BLD AUTO: 13 % (ref 4–12)
NEUTROPHILS # BLD AUTO: 3.79 THOUSANDS/ΜL (ref 1.85–7.62)
NEUTS SEG NFR BLD AUTO: 55 % (ref 43–75)
NRBC BLD AUTO-RTO: 0 /100 WBCS
PLATELET # BLD AUTO: 135 THOUSANDS/UL (ref 149–390)
PMV BLD AUTO: 11.4 FL (ref 8.9–12.7)
POTASSIUM SERPL-SCNC: 5 MMOL/L (ref 3.5–5.3)
RBC # BLD AUTO: 3.83 MILLION/UL (ref 3.88–5.62)
SODIUM SERPL-SCNC: 130 MMOL/L (ref 135–147)
WBC # BLD AUTO: 6.87 THOUSAND/UL (ref 4.31–10.16)

## 2025-01-08 PROCEDURE — 80048 BASIC METABOLIC PNL TOTAL CA: CPT

## 2025-01-08 PROCEDURE — 94760 N-INVAS EAR/PLS OXIMETRY 1: CPT

## 2025-01-08 PROCEDURE — 82948 REAGENT STRIP/BLOOD GLUCOSE: CPT

## 2025-01-08 PROCEDURE — 83735 ASSAY OF MAGNESIUM: CPT

## 2025-01-08 PROCEDURE — 85025 COMPLETE CBC W/AUTO DIFF WBC: CPT

## 2025-01-08 PROCEDURE — 94640 AIRWAY INHALATION TREATMENT: CPT

## 2025-01-08 RX ORDER — METOPROLOL TARTRATE 100 MG/1
150 TABLET ORAL EVERY 12 HOURS SCHEDULED
Start: 2025-01-08

## 2025-01-08 RX ORDER — METOPROLOL TARTRATE 100 MG/1
150 TABLET ORAL EVERY 12 HOURS SCHEDULED
Qty: 120 TABLET | Refills: 0 | OUTPATIENT
Start: 2025-01-08

## 2025-01-08 RX ORDER — SODIUM CHLORIDE 1 G/1
2 TABLET ORAL ONCE
Status: DISCONTINUED | OUTPATIENT
Start: 2025-01-08 | End: 2025-01-08 | Stop reason: HOSPADM

## 2025-01-08 RX ADMIN — FOLIC ACID 1000 MCG: 1 TABLET ORAL at 11:40

## 2025-01-08 RX ADMIN — IPRATROPIUM BROMIDE AND ALBUTEROL SULFATE 3 ML: .5; 3 SOLUTION RESPIRATORY (INHALATION) at 11:08

## 2025-01-08 RX ADMIN — METOPROLOL TARTRATE 200 MG: 100 TABLET, FILM COATED ORAL at 11:39

## 2025-01-08 RX ADMIN — FERROUS SULFATE TAB 325 MG (65 MG ELEMENTAL FE) 325 MG: 325 (65 FE) TAB at 11:40

## 2025-01-08 RX ADMIN — HEPARIN SODIUM 5000 UNITS: 5000 INJECTION, SOLUTION INTRAVENOUS; SUBCUTANEOUS at 07:15

## 2025-01-08 RX ADMIN — NICOTINE 1 PATCH: 21 PATCH, EXTENDED RELEASE TRANSDERMAL at 11:42

## 2025-01-08 RX ADMIN — FLUTICASONE FUROATE AND VILANTEROL TRIFENATATE 1 PUFF: 200; 25 POWDER RESPIRATORY (INHALATION) at 11:43

## 2025-01-08 RX ADMIN — INSULIN LISPRO 2 UNITS: 100 INJECTION, SOLUTION INTRAVENOUS; SUBCUTANEOUS at 11:52

## 2025-01-08 RX ADMIN — ASPIRIN 81 MG CHEWABLE TABLET 81 MG: 81 TABLET CHEWABLE at 11:40

## 2025-01-08 RX ADMIN — DILTIAZEM HYDROCHLORIDE 120 MG: 60 CAPSULE, EXTENDED RELEASE ORAL at 11:43

## 2025-01-08 RX ADMIN — THIAMINE HCL TAB 100 MG 100 MG: 100 TAB at 11:40

## 2025-01-08 RX ADMIN — APIXABAN 5 MG: 5 TABLET, FILM COATED ORAL at 11:39

## 2025-01-08 RX ADMIN — RANOLAZINE 500 MG: 500 TABLET, FILM COATED, EXTENDED RELEASE ORAL at 11:40

## 2025-01-08 RX ADMIN — IPRATROPIUM BROMIDE AND ALBUTEROL SULFATE 3 ML: .5; 3 SOLUTION RESPIRATORY (INHALATION) at 07:27

## 2025-01-08 RX ADMIN — Medication 800 MG: at 11:40

## 2025-01-08 NOTE — DISCHARGE INSTRUCTIONS
"Patient Education     Quitting smoking   The Basics   Written by the doctors and editors at [facility]   What are the benefits of quitting smoking? -- Quitting smoking can dramatically improve your health and help you live longer. It lowers your risk of heart disease, lung disease, kidney failure, cancer, infection, stomach problems, and diabetes.  Quitting smoking can also lower your chances of getting osteoporosis, a condition that makes your bones weak.  Quitting is not easy for most people, and it might take several tries to completely quit. But help and support are available. Quitting smoking will improve your health no matter how old you are, even if you have smoked for a long time.  What should I do if I want to quit smoking? -- It's a good idea to start by talking with your doctor or nurse. It is possible to quit on your own, without help. But getting help greatly increases your chances of quitting successfully.  When you are ready to quit, you will make a plan to:   Set a quit date.   Tell your family and friends that you plan to quit.   Plan ahead for the challenges you will face, such as cigarette cravings.   Remove cigarettes from your home, car, and work.  How can my doctor or nurse help? -- Your doctor or nurse can give you advice on the best way to quit. They can also give you medicines to:   Reduce your craving for cigarettes   Reduce your \"withdrawal\" symptoms (symptoms that happen when you stop smoking)  Your doctor or nurse can also help you find a counselor to talk to. For most people who are trying to quit smoking, it works best to use both medicines and counseling.  You can also get help from a free phone line (8-908-QUITNOW, or 1-455.695.8300) or go online to www.smokefree.gov.  What are the symptoms of withdrawal? -- When you stop smoking, you might have symptoms such as:   Trouble sleeping   Feeling irritable, anxious, or restless   Getting frustrated or angry   Having trouble thinking " clearly  These symptoms can be hard to deal with, which is why it can be so hard to quit. But medicines can help.  Some people who stop smoking become temporarily depressed. Some people need treatment for depression, such as counseling or medicines or both. People with depression might:   No longer enjoy or care about doing the things they used to like to do   Feel sad, down, hopeless, nervous, or cranky most of the day, almost every day   Lose or gain weight   Sleep too much or too little   Feel tired or like they have no energy   Feel guilty or like they are worth nothing   Forget things or feel confused   Move and speak more slowly than usual   Act restless or have trouble staying still   Think about death or suicide  If you think you might be depressed, tell your doctor or nurse right away. They can talk to you about your symptoms and recommend treatment if needed.  Get help right away if you are thinking of hurting or killing yourself! -- Sometimes, people with depression think of hurting or killing themselves. If you ever feel like you might hurt yourself, help is available:   In the , contact the [employer] Suicide [facility] Crisis Lifeline:   To speak to someone, call or text [employer].   To talk to someone online, go to www.[employer].org/chat.   Call your doctor or nurse, and tell them that it is an emergency.   Call for an ambulance (in the [place] and [place], call 9-1-1).   Go to the emergency department at your local hospital.  If you think your partner might have depression, or if you are worried that they might hurt themselves, get them help right away.  How does counseling work? -- A counselor can help you figure out:   What triggers you to want to smoke, and how to handle these situations   How to resist cravings   What you can do differently if you have tried to quit before  You can meet with a counselor in 1-on-1 sessions or as part of a group. There are other ways to get counseling, too, such as over the phone, through  "text messaging, or online.  How do medicines help you stop smoking? -- Different medicines work in different ways:   Nicotine replacement therapy - Nicotine is the main drug in cigarettes, and the reason they are addictive. These medicines reduce your body's craving for nicotine. They also help with withdrawal symptoms.  There are different forms of nicotine replacement, including skin patches, lozenges, gum, nasal sprays, and inhalers. Most can be bought without a prescription. Also, health insurance might cover some or all of the cost.  It often helps to use 2 forms of nicotine replacement. For example, you might wear a patch all the time, plus use gum or lozenges when you get a craving to smoke.   Varenicline - Varenicline (brand names: Chantix, Champix) is a prescription medicine that reduces withdrawal symptoms and cigarette cravings. Varenicline can increase the effects of alcohol in some people. It's a good idea to limit drinking while you're taking it, at least until you know how it affects you.  Even if you are not yet ready to commit to a quit date, varenicline can help reduce cravings. This can make it easier to quit when you are ready.   Bupropion - Bupropion (sample brand names: Wellbutrin, Zyban) is a prescription medicine that reduces your desire to smoke. It is also available in a generic version, which is cheaper than the brand name medicines. Doctors do not usually prescribe bupropion for people with seizures or who have had seizures in the past.  It might also be helpful to combine nicotine replacement with bupropion or varenicline. In some cases, a person might even take both bupropion and varenicline. Your doctor or nurse can help you figure out the best combination for you.  Can vaping help me quit? -- Sometimes, people wonder if vaping can help them quit smoking. Vaping is using electronic cigarettes, or \"e-cigarettes.\"  Doctors recommend using medicines and counseling to quit smoking. These " methods have been studied the most. But for people who have tried these and not been able to quit, switching to vaping might be an option. There are some things to remember:   Vaping might be less harmful than smoking regular cigarettes. But e-cigarettes still contain nicotine as well as other substances that might be harmful.   If you decide to try vaping to help you quit, it's important to switch completely from regular cigarettes to e-cigarettes. Using both will probably not be helpful, and might increase the risks of harm.   It's not clear how vaping can affect a person's health in the long term.  For these reasons, doctors recommend that if you do try vaping to help you quit smoking, you still make a plan to quit vaping eventually.  If you are interested in trying vaping to help you quit smoking, it's a good idea to talk to your doctor or nurse first.  What if I am pregnant and I smoke? -- If you are pregnant, it's really important for the health of your baby that you quit. Ask your doctor what options you have, and what is safest for your baby.  What if I have already smoked for a long time? -- The longer you have smoked, the higher your chances are of having health problems. But it is never too late to quit smoking. It helps your health even if you are older or have smoked for many years. The best thing you can do to lower your chance of having a health problem caused by smoking is to quit.  Will I gain weight if I quit? -- You might gain a few pounds. This can be frustrating for some people. But it's important to remember that you are improving your health by quitting smoking. You can help prevent gaining a lot of weight by staying active and eating a healthy diet.  What if I am not able to quit? -- If you don't quit on your first try, or if you quit but then start smoking again, don't give up hope. Lots of people have to try more than once before they are able to completely quit.  It might help to try to  understand why quitting did not work. There might be something you can do differently when you try again. It can help to figure out which situations make you want to smoke, so you can avoid them.  What else can I do to improve my chances of quitting? -- You can:   Get regular exercise. Any type of physical activity, even gentle forms of movement, is good for your health. Physical activity can also help reduce stress.   Stay away from people who smoke and places that make you want to smoke. If people close to you smoke, ask them to quit with you or avoid smoking around you.   Carry gum, hard candy, or something to put in your mouth. If you get a craving for a cigarette, try 1 of these instead.   Don't give up, even if you start smoking again. It takes most people a few tries before they succeed.  All topics are updated as new evidence becomes available and our peer review process is complete.  This topic retrieved from [employer] on: Mar 14, 2024.  Topic 98906 Version 33.0  Release: 32.2.4 - C32.72  © 2024 UpToDate, Inc. and/or its affiliates. All rights reserved.  Consumer Information Use and Disclaimer   Disclaimer: This generalized information is a limited summary of diagnosis, treatment, and/or medication information. It is not meant to be comprehensive and should be used as a tool to help the user understand and/or assess potential diagnostic and treatment options. It does NOT include all information about conditions, treatments, medications, side effects, or risks that may apply to a specific patient. It is not intended to be medical advice or a substitute for the medical advice, diagnosis, or treatment of a health care provider based on the health care provider's examination and assessment of a patient's specific and unique circumstances. Patients must speak with a health care provider for complete information about their health, medical questions, and treatment options, including any risks or benefits regarding  use of medications. This information does not endorse any treatments or medications as safe, effective, or approved for treating a specific patient. UpToDate, Inc. and its affiliates disclaim any warranty or liability relating to this information or the use thereof.The use of this information is governed by the Terms of Use, available at https://www.[employer].com/en/know/clinical-effectiveness-terms. 2024© UpToDate, Inc. and its affiliates and/or licensors. All rights reserved.  Copyright   © 2024 UpToDate, Inc. and/or its affiliates. All rights reserved.

## 2025-01-08 NOTE — TREATMENT PLAN
Home Oxygen Qualifying Test     Patient name: Gregg Partida        : 1960   Date of Test:  2025  Diagnosis:    Home Oxygen Test:    **Medicare Guidelines require item(s) 1-5 on all ambulatory patients or 1 and 2 on non-ambulatory patients.    1. Baseline SPO2 on Room Air at rest 98 %   If <= 88% on Room Air add O2 via NC to obtain SpO2 >=88%. If LPM needed, document LPM 1 needed to reach =>88%    SPO2 during exertion on Room Air 81  %  During exertion monitor SPO2. If SPO2 increases >=89%, do not add supplemental oxygen    SPO2 on Oxygen at Rest 98 % at 1 LPM    SPO2 during exertion on Oxygen 91 % at 4 LPM    Test performed during exertion activity.      [x]  Supplemental Home Oxygen is indicated.    []  Client does not qualify for home oxygen.    Respiratory Additional Notes- Patient ambulated through hallway and desatted to 81%.  He requires 4L while walking, none at rest.    Chantal Hallman, RT

## 2025-01-08 NOTE — PLAN OF CARE
Problem: Prexisting or High Potential for Compromised Skin Integrity  Goal: Skin integrity is maintained or improved  Description: INTERVENTIONS:  - Identify patients at risk for skin breakdown  - Assess and monitor skin integrity  - Assess and monitor nutrition and hydration status  - Monitor labs   - Assess for incontinence   - Turn and reposition patient  - Assist with mobility/ambulation  - Relieve pressure over bony prominences  - Avoid friction and shearing  - Provide appropriate hygiene as needed including keeping skin clean and dry  - Evaluate need for skin moisturizer/barrier cream  - Collaborate with interdisciplinary team   - Patient/family teaching  - Consider wound care consult   Outcome: Progressing     Problem: PAIN - ADULT  Goal: Verbalizes/displays adequate comfort level or baseline comfort level  Description: Interventions:  - Encourage patient to monitor pain and request assistance  - Assess pain using appropriate pain scale  - Administer analgesics based on type and severity of pain and evaluate response  - Implement non-pharmacological measures as appropriate and evaluate response  - Consider cultural and social influences on pain and pain management  - Notify physician/advanced practitioner if interventions unsuccessful or patient reports new pain  Outcome: Progressing     Problem: INFECTION - ADULT  Goal: Absence or prevention of progression during hospitalization  Description: INTERVENTIONS:  - Assess and monitor for signs and symptoms of infection  - Monitor lab/diagnostic results  - Monitor all insertion sites, i.e. indwelling lines, tubes, and drains  - Monitor endotracheal if appropriate and nasal secretions for changes in amount and color  - [place] appropriate cooling/warming therapies per order  - Administer medications as ordered  - Instruct and encourage patient and family to use good hand hygiene technique  - Identify and instruct in appropriate isolation precautions for  identified infection/condition  Outcome: Progressing  Goal: Absence of fever/infection during neutropenic period  Description: INTERVENTIONS:  - Monitor WBC    Outcome: Progressing     Problem: SAFETY ADULT  Goal: Patient will remain free of falls  Description: INTERVENTIONS:  - Educate patient/family on patient safety including physical limitations  - Instruct patient to call for assistance with activity   - Consult OT/PT to assist with strengthening/mobility   - Keep Call bell within reach  - Keep bed low and locked with side rails adjusted as appropriate  - Keep care items and personal belongings within reach  - Initiate and maintain comfort rounds  - Make Fall Risk Sign visible to staff  - Offer Toileting every 2 Hours, in advance of need  - Initiate/Maintain bed alarm  - Obtain necessary fall risk management equipment: nonskid socks  - Apply yellow socks and bracelet for high fall risk patients  - Consider moving patient to room near nurses station  Outcome: Progressing  Goal: Maintain or return to baseline ADL function  Description: INTERVENTIONS:  -  Assess patient's ability to carry out ADLs; assess patient's baseline for ADL function and identify physical deficits which impact ability to perform ADLs (bathing, care of mouth/teeth, toileting, grooming, dressing, etc.)  - Assess/evaluate cause of self-care deficits   - Assess range of motion  - Assess patient's mobility; develop plan if impaired  - Assess patient's need for assistive devices and provide as appropriate  - Encourage maximum independence but intervene and supervise when necessary  - Involve family in performance of ADLs  - Assess for home care needs following discharge   - Consider OT consult to assist with ADL evaluation and planning for discharge  - Provide patient education as appropriate  Outcome: Progressing  Goal: Maintains/Returns to pre admission functional level  Description: INTERVENTIONS:  - Perform AM-PAC 6 Click Basic Mobility/  Daily Activity assessment daily.  - Set and communicate daily mobility goal to care team and patient/family/caregiver.   - Collaborate with rehabilitation services on mobility goals if consulted  - Perform Range of Motion 3 times a day.  - Reposition patient every 2 hours.  - Dangle patient 3 times a day  - Stand patient 3 times a day  - Ambulate patient 3 times a day  - Out of bed to chair 3 times a day   - Out of bed for meals 3 times a day  - Out of bed for toileting  - Record patient progress and toleration of activity level   Outcome: Progressing     Problem: DISCHARGE PLANNING  Goal: Discharge to home or other facility with appropriate resources  Description: INTERVENTIONS:  - Identify barriers to discharge w/patient and caregiver  - Arrange for needed discharge resources and transportation as appropriate  - Identify discharge learning needs (meds, wound care, etc.)  - Arrange for interpretive services to assist at discharge as needed  - Refer to Case Management Department for coordinating discharge planning if the patient needs post-hospital services based on physician/advanced practitioner order or complex needs related to functional status, cognitive ability, or social support system  Outcome: Progressing     Problem: Knowledge Deficit  Goal: Patient/family/caregiver demonstrates understanding of disease process, treatment plan, medications, and discharge instructions  Description: Complete learning assessment and assess knowledge base.  Interventions:  - Provide teaching at level of understanding  - Provide teaching via preferred learning methods  Outcome: Progressing     Problem: CARDIOVASCULAR - ADULT  Goal: Maintains optimal cardiac output and hemodynamic stability  Description: INTERVENTIONS:  - Monitor I/O, vital signs and rhythm  - Monitor for S/S and trends of decreased cardiac output  - Administer and titrate ordered vasoactive medications to optimize hemodynamic stability  - Assess quality of  pulses, skin color and temperature  - Assess for signs of decreased coronary artery perfusion  - Instruct patient to report change in severity of symptoms  Outcome: Progressing  Goal: Absence of cardiac dysrhythmias or at baseline rhythm  Description: INTERVENTIONS:  - Continuous cardiac monitoring, vital signs, obtain 12 lead EKG if ordered  - Administer antiarrhythmic and heart rate control medications as ordered  - Monitor electrolytes and administer replacement therapy as ordered  Outcome: Progressing     Problem: METABOLIC, FLUID AND ELECTROLYTES - ADULT  Goal: Electrolytes maintained within normal limits  Description: INTERVENTIONS:  - Monitor labs and assess patient for signs and symptoms of electrolyte imbalances  - Administer electrolyte replacement as ordered  - Monitor response to electrolyte replacements, including repeat lab results as appropriate  - Instruct patient on fluid and nutrition as appropriate  Outcome: Progressing  Goal: Fluid balance maintained  Description: INTERVENTIONS:  - Monitor labs   - Monitor I/O and WT  - Instruct patient on fluid and nutrition as appropriate  - Assess for signs & symptoms of volume excess or deficit  Outcome: Progressing  Goal: Glucose maintained within target range  Description: INTERVENTIONS:  - Monitor Blood Glucose as ordered  - Assess for signs and symptoms of hyperglycemia and hypoglycemia  - Administer ordered medications to maintain glucose within target range  - Assess nutritional intake and initiate nutrition service referral as needed  Outcome: Progressing     Problem: SKIN/TISSUE INTEGRITY - ADULT  Goal: Skin Integrity remains intact(Skin Breakdown Prevention)  Description: Assess:  -Perform Herrera assessment every shift  -Clean and moisturize skin every shift  -Inspect skin when repositioning, toileting, and assisting with ADLS  -Assess under medical devices such as nonskid socks every shift  -Assess extremities for adequate circulation and sensation      Bed Management:  -Have minimal linens on bed & keep smooth, unwrinkled  -Change linens as needed when moist or perspiring  -Avoid sitting or lying in one position for more than 2 hours while in bed  -Keep HOB at 30 degrees     Toileting:  -Offer bedside commode  -Assess for incontinence every 2 hrs  -Use incontinent care products after each incontinent episode such as wipes    Activity:  -Mobilize patient 3 times a day  -Encourage activity and walks on unit  -Encourage or provide ROM exercises   -Turn and reposition patient every 2 Hours  -Use appropriate equipment to lift or move patient in bed  -Instruct/ Assist with weight shifting every 1hr when out of bed in chair  -Consider limitation of chair time 4hr hour intervals    Skin Care:  -Avoid use of baby powder, tape, friction and shearing, hot water or constrictive clothing  -Relieve pressure over bony prominences using wedges/pillows  -Do not massage red bony areas    Next Steps:  -Teach patient strategies to minimize risks such as mobility   -Consider consults to  interdisciplinary teams such as OT/PT  Outcome: Progressing  Goal: Incision(s), wounds(s) or drain site(s) healing without S/S of infection  Description: INTERVENTIONS  - Assess and document dressing, incision, wound bed, drain sites and surrounding tissue  - Provide patient and family education  - Perform skin care/dressing changes every shift  Outcome: Progressing  Goal: Pressure injury heals and does not worsen  Description: Interventions:  - Implement low air loss mattress or specialty surface (Criteria met)  - Apply silicone foam dressing  - Instruct/assist with weight shifting every 60 minutes when in chair   - Limit chair time to 4 hour intervals  - Use special pressure reducing interventions such as wedges/pillows when in chair   - Apply fecal or urinary incontinence containment device   - Perform passive or active ROM every 8hrs  - Turn and reposition patient & offload bony prominences  every 2 hours   - Utilize friction reducing device or surface for transfers   - Consider consults to  interdisciplinary teams such as OT/PT  - Use incontinent care products after each incontinent episode such as wipes  - Consider nutrition services referral as needed  Outcome: Progressing     Problem: MUSCULOSKELETAL - ADULT  Goal: Maintain or return mobility to safest level of function  Description: INTERVENTIONS:  - Assess patient's ability to carry out ADLs; assess patient's baseline for ADL function and identify physical deficits which impact ability to perform ADLs (bathing, care of mouth/teeth, toileting, grooming, dressing, etc.)  - Assess/evaluate cause of self-care deficits   - Assess range of motion  - Assess patient's mobility  - Assess patient's need for assistive devices and provide as appropriate  - Encourage maximum independence but intervene and supervise when necessary  - Involve family in performance of ADLs  - Assess for home care needs following discharge   - Consider OT consult to assist with ADL evaluation and planning for discharge  - Provide patient education as appropriate  Outcome: Progressing  Goal: Maintain proper alignment of affected body part  Description: INTERVENTIONS:  - Support, maintain and protect limb and body alignment  - Provide patient/ family with appropriate education  Outcome: Progressing     Problem: RESPIRATORY - ADULT  Goal: Achieves optimal ventilation and oxygenation  Description: INTERVENTIONS:  - Assess for changes in respiratory status  - Assess for changes in mentation and behavior  - Position to facilitate oxygenation and minimize respiratory effort  - Oxygen administered by appropriate delivery if ordered  - Initiate smoking cessation education as indicated  - Encourage broncho-pulmonary hygiene including cough, deep breathe, Incentive Spirometry  - Assess the need for suctioning and aspirate as needed  - Assess and instruct to report SOB or any respiratory  difficulty  - Respiratory Therapy support as indicated  Outcome: Progressing

## 2025-01-08 NOTE — PLAN OF CARE
Problem: Prexisting or High Potential for Compromised Skin Integrity  Goal: Skin integrity is maintained or improved  Description: INTERVENTIONS:  - Identify patients at risk for skin breakdown  - Assess and monitor skin integrity  - Assess and monitor nutrition and hydration status  - Monitor labs   - Assess for incontinence   - Turn and reposition patient  - Assist with mobility/ambulation  - Relieve pressure over bony prominences  - Avoid friction and shearing  - Provide appropriate hygiene as needed including keeping skin clean and dry  - Evaluate need for skin moisturizer/barrier cream  - Collaborate with interdisciplinary team   - Patient/family teaching  - Consider wound care consult   Outcome: Progressing     Problem: PAIN - ADULT  Goal: Verbalizes/displays adequate comfort level or baseline comfort level  Description: Interventions:  - Encourage patient to monitor pain and request assistance  - Assess pain using appropriate pain scale  - Administer analgesics based on type and severity of pain and evaluate response  - Implement non-pharmacological measures as appropriate and evaluate response  - Consider cultural and social influences on pain and pain management  - Notify physician/advanced practitioner if interventions unsuccessful or patient reports new pain  Outcome: Progressing     Problem: INFECTION - ADULT  Goal: Absence or prevention of progression during hospitalization  Description: INTERVENTIONS:  - Assess and monitor for signs and symptoms of infection  - Monitor lab/diagnostic results  - Monitor all insertion sites, i.e. indwelling lines, tubes, and drains  - Monitor endotracheal if appropriate and nasal secretions for changes in amount and color  - [place] appropriate cooling/warming therapies per order  - Administer medications as ordered  - Instruct and encourage patient and family to use good hand hygiene technique  - Identify and instruct in appropriate isolation precautions for  identified infection/condition  Outcome: Progressing  Goal: Absence of fever/infection during neutropenic period  Description: INTERVENTIONS:  - Monitor WBC    Outcome: Progressing     Problem: SAFETY ADULT  Goal: Patient will remain free of falls  Description: INTERVENTIONS:  - Educate patient/family on patient safety including physical limitations  - Instruct patient to call for assistance with activity   - Consult OT/PT to assist with strengthening/mobility   - Keep Call bell within reach  - Keep bed low and locked with side rails adjusted as appropriate  - Keep care items and personal belongings within reach  - Initiate and maintain comfort rounds  - Make Fall Risk Sign visible to staff  - Offer Toileting every 2 Hours, in advance of need  - Initiate/Maintain bed alarm  - Obtain necessary fall risk management equipment: call bell   - Apply yellow socks and bracelet for high fall risk patients  - Consider moving patient to room near nurses station  Outcome: Progressing  Goal: Maintain or return to baseline ADL function  Description: INTERVENTIONS:  -  Assess patient's ability to carry out ADLs; assess patient's baseline for ADL function and identify physical deficits which impact ability to perform ADLs (bathing, care of mouth/teeth, toileting, grooming, dressing, etc.)  - Assess/evaluate cause of self-care deficits   - Assess range of motion  - Assess patient's mobility; develop plan if impaired  - Assess patient's need for assistive devices and provide as appropriate  - Encourage maximum independence but intervene and supervise when necessary  - Involve family in performance of ADLs  - Assess for home care needs following discharge   - Consider OT consult to assist with ADL evaluation and planning for discharge  - Provide patient education as appropriate  Outcome: Progressing  Goal: Maintains/Returns to pre admission functional level  Description: INTERVENTIONS:  - Perform AM-PAC 6 Click Basic Mobility/ Daily  Activity assessment daily.  - Set and communicate daily mobility goal to care team and patient/family/caregiver.   - Collaborate with rehabilitation services on mobility goals if consulted  - Perform Range of Motion 3 times a day.  - Reposition patient every 3 hours.  - Dangle patient 3 times a day  - Stand patient 3 times a day  - Ambulate patient 3 times a day  - Out of bed to chair 3 times a day   - Out of bed for meals 3 times a day  - Out of bed for toileting  - Record patient progress and toleration of activity level   Outcome: Progressing     Problem: DISCHARGE PLANNING  Goal: Discharge to home or other facility with appropriate resources  Description: INTERVENTIONS:  - Identify barriers to discharge w/patient and caregiver  - Arrange for needed discharge resources and transportation as appropriate  - Identify discharge learning needs (meds, wound care, etc.)  - Arrange for interpretive services to assist at discharge as needed  - Refer to Case Management Department for coordinating discharge planning if the patient needs post-hospital services based on physician/advanced practitioner order or complex needs related to functional status, cognitive ability, or social support system  Outcome: Progressing     Problem: Knowledge Deficit  Goal: Patient/family/caregiver demonstrates understanding of disease process, treatment plan, medications, and discharge instructions  Description: Complete learning assessment and assess knowledge base.  Interventions:  - Provide teaching at level of understanding  - Provide teaching via preferred learning methods  Outcome: Progressing     Problem: CARDIOVASCULAR - ADULT  Goal: Maintains optimal cardiac output and hemodynamic stability  Description: INTERVENTIONS:  - Monitor I/O, vital signs and rhythm  - Monitor for S/S and trends of decreased cardiac output  - Administer and titrate ordered vasoactive medications to optimize hemodynamic stability  - Assess quality of pulses,  skin color and temperature  - Assess for signs of decreased coronary artery perfusion  - Instruct patient to report change in severity of symptoms  Outcome: Progressing  Goal: Absence of cardiac dysrhythmias or at baseline rhythm  Description: INTERVENTIONS:  - Continuous cardiac monitoring, vital signs, obtain 12 lead EKG if ordered  - Administer antiarrhythmic and heart rate control medications as ordered  - Monitor electrolytes and administer replacement therapy as ordered  Outcome: Progressing     Problem: METABOLIC, FLUID AND ELECTROLYTES - ADULT  Goal: Electrolytes maintained within normal limits  Description: INTERVENTIONS:  - Monitor labs and assess patient for signs and symptoms of electrolyte imbalances  - Administer electrolyte replacement as ordered  - Monitor response to electrolyte replacements, including repeat lab results as appropriate  - Instruct patient on fluid and nutrition as appropriate  Outcome: Progressing  Goal: Fluid balance maintained  Description: INTERVENTIONS:  - Monitor labs   - Monitor I/O and WT  - Instruct patient on fluid and nutrition as appropriate  - Assess for signs & symptoms of volume excess or deficit  Outcome: Progressing  Goal: Glucose maintained within target range  Description: INTERVENTIONS:  - Monitor Blood Glucose as ordered  - Assess for signs and symptoms of hyperglycemia and hypoglycemia  - Administer ordered medications to maintain glucose within target range  - Assess nutritional intake and initiate nutrition service referral as needed  Outcome: Progressing     Problem: SKIN/TISSUE INTEGRITY - ADULT  Goal: Skin Integrity remains intact(Skin Breakdown Prevention)  Description: Assess:  -Perform Herrera assessment every shift  -Clean and moisturize skin every shift   -Inspect skin when repositioning, toileting, and assisting with ADLS  -Assess under medical devices   -Assess extremities for adequate circulation and sensation     Bed Management:  -Have minimal  linens on bed & keep smooth, unwrinkled  -Change linens as needed when moist or perspiring  -Avoid sitting or lying in one position for more than 1 hours while in bed  -Keep HOB at 30degrees     Toileting:  -Offer bedside commode  -Assess for incontinence every shift   -Use incontinent care products after each incontinent episode    Activity:  -Mobilize patient 3 times a day  -Encourage activity and walks on unit  -Encourage or provide ROM exercises   -Turn and reposition patient every 2 Hours  -Use appropriate equipment to lift or move patient in bed  -Instruct/ Assist with weight shifting every hour  when out of bed in chair  -Consider limitation of chair time 1 hour intervals    Skin Care:  -Avoid use of baby powder, tape, friction and shearing, hot water or constrictive clothing  -Relieve pressure over bony prominences using waffle cushion   -Do not massage red bony areas    Next Steps:  -Teach patient strategies to minimize risks such as weight shifting    -Consider consults to  interdisciplinary teams such as wound care team  Outcome: Progressing  Goal: Incision(s), wounds(s) or drain site(s) healing without S/S of infection  Description: INTERVENTIONS  - Assess and document dressing, incision, wound bed, drain sites and surrounding tissue  - Provide patient and family education  - Perform skin care/dressing changes every shift   Outcome: Progressing  Goal: Pressure injury heals and does not worsen  Description: Interventions:  - Implement low air loss mattress or specialty surface (Criteria met)  - Apply silicone foam dressing  - Instruct/assist with weight shifting every 60 minutes when in chair   - Limit chair time to 1 hour intervals  - Use special pressure reducing interventions such as waffle cushion  when in chair   - Apply fecal or urinary incontinence containment device   - Perform passive or active ROM every shift   - Turn and reposition patient & offload bony prominences every 2 hours   - Utilize  friction reducing device or surface for transfers   - Consider consults to  interdisciplinary teams such as wound care team   - Use incontinent care products after each incontinent episode   - Consider nutrition services referral as needed  Outcome: Progressing     Problem: MUSCULOSKELETAL - ADULT  Goal: Maintain or return mobility to safest level of function  Description: INTERVENTIONS:  - Assess patient's ability to carry out ADLs; assess patient's baseline for ADL function and identify physical deficits which impact ability to perform ADLs (bathing, care of mouth/teeth, toileting, grooming, dressing, etc.)  - Assess/evaluate cause of self-care deficits   - Assess range of motion  - Assess patient's mobility  - Assess patient's need for assistive devices and provide as appropriate  - Encourage maximum independence but intervene and supervise when necessary  - Involve family in performance of ADLs  - Assess for home care needs following discharge   - Consider OT consult to assist with ADL evaluation and planning for discharge  - Provide patient education as appropriate  Outcome: Progressing  Goal: Maintain proper alignment of affected body part  Description: INTERVENTIONS:  - Support, maintain and protect limb and body alignment  - Provide patient/ family with appropriate education  Outcome: Progressing     Problem: RESPIRATORY - ADULT  Goal: Achieves optimal ventilation and oxygenation  Description: INTERVENTIONS:  - Assess for changes in respiratory status  - Assess for changes in mentation and behavior  - Position to facilitate oxygenation and minimize respiratory effort  - Oxygen administered by appropriate delivery if ordered  - Initiate smoking cessation education as indicated  - Encourage broncho-pulmonary hygiene including cough, deep breathe, Incentive Spirometry  - Assess the need for suctioning and aspirate as needed  - Assess and instruct to report SOB or any respiratory difficulty  - Respiratory  Therapy support as indicated  Outcome: Progressing

## 2025-01-08 NOTE — CASE MANAGEMENT
Case Management Discharge Planning Note    Patient name Gregg Partida  Location [address] [place] [address]-* MRN [id number]  : 1960 Date 2025       Current Admission Date: 2025  Current Admission Diagnosis:Acute kidney injury superimposed on chronic kidney disease  (HCC)   Patient Active Problem List    Diagnosis Date Noted Date Diagnosed    Dyspnea on exertion 2025     Smoking 2024     Mucus plugging of bronchi 2024     Acute respiratory failure with hypoxia (HCC) 2024     Chronic hyponatremia 2024     Closed fracture of one rib of left side 2024     Ambulatory dysfunction 2024     Financial insecurity 2024     BPH (benign prostatic hyperplasia) 2024     Chronic alcohol use 2024     Hypertension 2024     Hemorrhagic cystitis 2024     Acute kidney injury superimposed on chronic kidney disease  (HCC) 02/10/2024     Hypothyroidism 2024     Gastrointestinal hemorrhage with melena 10/29/2023     Chest pain 2023     Longstanding persistent atrial fibrillation (HCC) 2023     GERD (gastroesophageal reflux disease) 2023     Stable angina (HCC) 2022     Stage 3a chronic kidney disease (HCC) 2022     Fatty liver, alcoholic 04/15/2022     Nonischemic nontraumatic myocardial injury 04/10/2022     History of atrial fibrillation 10/25/2021     Mild protein-calorie malnutrition (HCC) 2021     Prostate cancer (HCC) 2021     Atrial fibrillation (HCC) 2020     Chronic heart failure with preserved ejection fraction (HCC) 2019     Noncompliance 2019     ETOH abuse 2019     Alcohol withdrawal syndrome with complication (HCC) 10/17/2019     Generalized weakness 10/17/2019     Electrolyte abnormality 10/17/2019     Cervical spinal stenosis 10/17/2019     History of prostate cancer 2019     CAD (coronary artery disease) 2019     Nicotine abuse 2019      Hypomagnesemia 10/10/2018     Depression, recurrent (HCC) 10/10/2018     Hx of CABG 10/10/2018     Dyslipidemia 10/10/2018     COPD, severity to be determined ([facility]) 10/09/2018     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 disease (HCC) 10/09/2018     Hypertensive heart and chronic kidney disease with heart failure and stage 1 through stage 4 chronic kidney disease, or chronic kidney disease (HCC) 10/09/2018       LOS (days): 1  Geometric Mean LOS (GMLOS) (days): 3  Days to GMLOS:2.1     OBJECTIVE:  Risk of Unplanned Readmission Score: 63.2         Current admission status: Inpatient   Preferred Pharmacy:   [facility] [place], [place] - [address] [address]  Phone: [phone] Fax: [phone]    [facility] [address] [place] [address] - [address]  [address]  [facility] [address]  Phone: [phone] Fax: [phone]    Primary Care Provider: Lilliana Bliss MD    Primary Insurance: AARP MC REP  Secondary Insurance:     DISCHARGE DETAILS:  DME Referral Provided  Referral made for DME?: Yes  DME referral completed for the following items:: Portable Oxygen tanks, Home Oxygen concentrator  D[facility]    Other Referral/Resources/Interventions Provided:  Referral Comments: DESIREE completed order for home O2 setup. Pt needs 4L's O2 on extertion. CM delivered O2 tank to pt at bedside. Pt was upset with size of tank and wanted smaller portable tank. CM informed pt he would need to go see his pulmonologist for them to order him a portable option if he qualifies for it. CM informed pt he will get a call from [employer] to schedule delivery of his home O2 tanks. Pt's reports his new number is [phone]. [facility].  Programs:: COPD    Transport at Discharge : Ride Share     Transported by (Company and Unit #): [place] or [employer]  ETA of Transport (Date): 01/08/25  ETA of Transport (Time): 1500

## 2025-01-09 ENCOUNTER — TRANSITIONAL CARE MANAGEMENT (OUTPATIENT)
Age: 65
End: 2025-01-09

## 2025-01-09 ENCOUNTER — TELEPHONE (OUTPATIENT)
Age: 65
End: 2025-01-09

## 2025-01-09 NOTE — ASSESSMENT & PLAN NOTE
Chronic. Stable. Patient is home on Lopressor 200 mg Q12, Ranolazine 500 mg Q12, and Cardizem  mg Q12. Patient took all his meds this morning prior to coming to the ED.  Patient BP on admission was 88/51.     Plan:   Continue home Ranolazine, and Cardizem   Start taking 150 mg Lopressor BID

## 2025-01-09 NOTE — ASSESSMENT & PLAN NOTE
Chronic. Stable. Smoke 1.5 packs x day. Patient states that he doesn't use his nicotine patch at home.    S/p Nicotine patch 21 mg / 24-hour     Plan:   F/u with PCP about smoking cessation

## 2025-01-09 NOTE — ASSESSMENT & PLAN NOTE
Chronic. Stable. Patient reported worsening SOB with exertion today. Patient is home on Breo Ellipta inhaler and Albuterol inhaler at home.   CXR 1/6: No acute cardiopulmonary disease.     Patient currently breathing fine on room air.    S/p home Breo ellipta inhaler  S/p home Albuterol inhaler  S/p Duo-neb inhalation soln.    Plan:  Continue home Breo ellipta inhaler  Continue home Albuterol inhaler

## 2025-01-09 NOTE — ASSESSMENT & PLAN NOTE
Chronic. Patient has had multiple hospitalizations due to Afib. Patient is home on 5 mg Eliquis BID, 120 mg Cardizem SR Q12, and 200 mg Lopressor Q12. Patient admits to being compliant with his Metoprolol, however he states he hasn't taken his Eliquis since his last hospital admission, due to a pending procedure.     EKG: A fib w/ a competing junctional pacemaker    Plan:  Start taking 150 mg Lopressor BID  Continue home Eliquis   Continue home Cardizem  F/u with Cariology

## 2025-01-09 NOTE — TELEPHONE ENCOUNTER
Attempted to contact pt on both number in chart. Unfortunately those numbers are not in service and disconnected. Thank you

## 2025-01-09 NOTE — TELEPHONE ENCOUNTER
----- Message from Nicky Messer DO sent at 1/8/2025  9:23 PM EST -----  Greetings,    Please make a 7 day TCM for this patient, thanks.

## 2025-01-09 NOTE — UTILIZATION REVIEW
NOTIFICATION OF ADMISSION DISCHARGE   This is a Notification of Discharge from [facility]. Please be advised that this patient has been discharge from our facility. Below you will find the admission and discharge date and time including the patient’s disposition.   UTILIZATION REVIEW CONTACT:  Grant Johnson  Utilization   Network Utilization Review Department  Phone: [phone] x carefully listen to the prompts. All voicemails are confidential.  Email: [email or web address]     ADMISSION INFORMATION  PRESENTATION DATE: 1/6/2025 11:05 AM  OBERVATION ADMISSION DATE: 01/06/2025 1247  INPATIENT ADMISSION DATE: 1/7/25  4:28 PM   DISCHARGE DATE: 1/8/2025  2:15 PM   DISPOSITION:Home/Self Care    Network Utilization Review Department  ATTENTION: Please call with any questions or concerns to [phone] and carefully listen to the prompts so that you are directed to the right person. All voicemails are confidential.   For Discharge needs, contact Care Management DC Support Team at [phone] opt. 2  Send all requests for admission clinical reviews, approved or denied determinations and any other requests to dedicated fax number below belonging to the campus where the patient is receiving treatment. List of dedicated fax numbers for the Facilities:  FACILITY NAME UR FAX NUMBER   ADMISSION DENIALS (Administrative/Medical Necessity) [phone]   DISCHARGE SUPPORT TEAM ([facility]) [phone]   PARENT CHILD HEALTH (Maternity/NICU/Pediatrics) [phone]   [facility] [phone]   [facility] [phone]   [facility] [phone]   [facility] [phone]   [facility] [phone]   [facility] [phone]   [facility] [phone]   [facility]  [phone]   [facility] [phone]   [facility] [phone]   [facility] [phone]   [facility] [phone]

## 2025-01-09 NOTE — ASSESSMENT & PLAN NOTE
Lab Results   Component Value Date    EGFR 63 01/08/2025    EGFR 59 01/07/2025    EGFR 35 01/06/2025    CREATININE 1.22 01/08/2025    CREATININE 1.28 01/07/2025    CREATININE 1.95 (H) 01/06/2025     Likely Prerenal ENMA from dehydration vs. Hypotension due to meds  Patient presents with Cr 1.95. Patient has a baseline Cr of 1. Patient states he has been feeling very weak lately, and becoming short of breath with exertion. Today he laid on the floor due to inability to catch his breath after exerting himself and EMS was called. Patient denies fall and LOC. Patient also admits to having decreased PO intake for the past week due to loss of appetite.      ED Course: 500 mL NS bolus x 2, 2g IV Mg SO4  CT head: No acute intracranial hemorrhage, significant mass effect or midline shift.   CT spine: No acute cervical spine fracture or traumatic malalignment.   CXR 1/6: No acute cardiopulmonary disease.     Labs: Cr 1.28    S/p 75 mL/hr IV NS   S/p 20 mg IV Lasix once

## 2025-01-09 NOTE — ASSESSMENT & PLAN NOTE
Patient has been feeling unwell for the past week. He states he has not been eating or drinking much for the past week. Patient normally ambulates with a walker, but states that today he felt very short of breath with exertion. Patient denies n/v/d.     S/p 500mL IV NS bolus x 2 in ED  S/p 75 mL/hr of continuous IV NS  Repleted electrolytes as needed  S/p Orthostatics in place  S/p Fall precautions

## 2025-01-09 NOTE — ASSESSMENT & PLAN NOTE
Patient came in with complaints of SOB with exertion. Patient is not on any oxygen at baseline, and isn't requiring any supplemental oxygen during this admission.  CXR: No acute cardiopulmonary disease.   ECHO: PAP 40 to 45 mm of hg   Walk study on room air showed desaturation to 76     STAT CXR: No acute cardiopulmonary disease. Unchanged cardiomediastinal silhouette. Enlarged central pulmonary vasculature may represent pulmonary arterial hypertension.  S/p 75 mL/hr IV NS discontinued   BNP: 1041    Plan:  F/u with Pulmonology outpatient

## 2025-01-09 NOTE — ASSESSMENT & PLAN NOTE
Patient presents with hyponatremia and hypomagnesemia in the setting of ENMA on CKD. Patient admits to decreased PO intake and denies nausea, vomiting, and diarrhea.    Labs: Na 132, Mg 1.4    Repleted electrolytes as needed  S/p IVF hydration

## 2025-01-09 NOTE — ASSESSMENT & PLAN NOTE
Lab Results   Component Value Date    HGBA1C 5.2 11/14/2024       Recent Labs     01/07/25  2113 01/07/25  2245 01/08/25  0738 01/08/25  1150   POCGLU 122 130 183* 204*         Blood Sugar Average: Last 72 hrs:  (P) [id number]    Chronic. Stable. Patient is home on 500mg Metformin tabs daily w/ breakfast  Make sure to give Regular Diet during inpatient admissions (patient preference)     Held home Metformin   S/p ISS w/ accu checks    Plan:  Resume home Metformin    Doxycycline Pregnancy And Lactation Text: This medication is Pregnancy Category D and not consider safe during pregnancy. It is also excreted in breast milk but is considered safe for shorter treatment courses.

## 2025-01-30 PROBLEM — I48.91 ATRIAL FIBRILLATION WITH RAPID VENTRICULAR RESPONSE (HCC): Status: ACTIVE | Noted: 2025-01-30

## 2025-02-04 ENCOUNTER — TELEPHONE (OUTPATIENT)
Age: 65
End: 2025-02-04

## 2025-02-04 NOTE — TELEPHONE ENCOUNTER
Contacted patient via phone number on file. Phone number is no longer in service. Cannot leave a message to schedule an AWV.

## 2025-02-20 ENCOUNTER — TELEPHONE (OUTPATIENT)
Age: 65
End: 2025-02-20

## 2025-02-20 NOTE — TELEPHONE ENCOUNTER
Patient admitted to [facility] 2/15/25 -2/19/25 s/p multiple falls. Admitted with ENMA, metabolic acidosis, alcohol intoxication. Patient was discharged home 2/19/25 and advised to f/u with PCP within 1 week. Please make TCM call and schedule patient for TCM appointment.

## 2025-02-21 ENCOUNTER — TRANSITIONAL CARE MANAGEMENT (OUTPATIENT)
Age: 65
End: 2025-02-21

## 2025-02-26 ENCOUNTER — TELEPHONE (OUTPATIENT)
Age: 65
End: 2025-02-26

## 2025-02-26 NOTE — TELEPHONE ENCOUNTER
Contacted patient via phone number on file, no answer. Left a message to reschedule missed appointment from 2/25.  Letter sent

## 2025-03-08 ENCOUNTER — APPOINTMENT (EMERGENCY)
Dept: RADIOLOGY | Facility: HOSPITAL | Age: 65
End: 2025-03-08
Payer: COMMERCIAL

## 2025-03-08 ENCOUNTER — HOSPITAL ENCOUNTER (EMERGENCY)
Facility: HOSPITAL | Age: 65
Discharge: HOME | End: 2025-03-08
Attending: EMERGENCY MEDICINE | Admitting: EMERGENCY MEDICINE
Payer: COMMERCIAL

## 2025-03-08 VITALS
OXYGEN SATURATION: 96 % | DIASTOLIC BLOOD PRESSURE: 89 MMHG | HEART RATE: 101 BPM | TEMPERATURE: 97.8 F | SYSTOLIC BLOOD PRESSURE: 165 MMHG | RESPIRATION RATE: 20 BRPM
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
## 2025-03-08 PROCEDURE — 72125 CT NECK SPINE W/O DYE: CPT

## 2025-03-08 PROCEDURE — 99284 EMERGENCY DEPT VISIT MOD MDM: CPT

## 2025-03-08 PROCEDURE — 70450 CT HEAD/BRAIN W/O DYE: CPT

## 2025-03-10 NOTE — ED PROVIDER NOTES
Sign out    ED Course as of 03/09/25 2325   Sat Mar 08, 2025   2214 Sober      CT cervical spine without contrast   Final Result by Jesús Pang MD (03/08 2227)      No acute cervical spine fracture or traumatic malalignment.      Postsurgical changes discussed above.                  Workstation performed: [id number]         CT head without contrast   Final Result by Jesús Pang MD (03/08 2219)      No acute intracranial abnormality.  Chronic microangiopathic changes.                  Workstation performed: [id number]               Monitor pt until clinically sober    Final Diagnosis:  1. Fall, initial encounter           Fito Liang MD  03/09/25 2325

## 2025-03-12 ENCOUNTER — APPOINTMENT (EMERGENCY)
Dept: RADIOLOGY | Facility: HOSPITAL | Age: 65
End: 2025-03-12
Payer: COMMERCIAL

## 2025-03-12 ENCOUNTER — HOSPITAL ENCOUNTER (EMERGENCY)
Facility: HOSPITAL | Age: 65
Discharge: HOME | End: 2025-03-12
Attending: EMERGENCY MEDICINE
Payer: COMMERCIAL

## 2025-03-12 VITALS
OXYGEN SATURATION: 94 % | DIASTOLIC BLOOD PRESSURE: 66 MMHG | TEMPERATURE: 97.7 F | RESPIRATION RATE: 16 BRPM | HEART RATE: 63 BPM | SYSTOLIC BLOOD PRESSURE: 98 MMHG
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
## 2025-03-12 DIAGNOSIS — S22.32XA CLOSED FRACTURE OF ONE RIB OF LEFT SIDE, INITIAL ENCOUNTER: ICD-10-CM

## 2025-03-12 DIAGNOSIS — F10.10 ALCOHOL ABUSE: ICD-10-CM

## 2025-03-12 DIAGNOSIS — S09.90XA CLOSED HEAD INJURY, INITIAL ENCOUNTER: ICD-10-CM

## 2025-03-12 PROCEDURE — 72125 CT NECK SPINE W/O DYE: CPT

## 2025-03-12 PROCEDURE — 99284 EMERGENCY DEPT VISIT MOD MDM: CPT

## 2025-03-12 PROCEDURE — 73030 X-RAY EXAM OF SHOULDER: CPT

## 2025-03-12 PROCEDURE — 99285 EMERGENCY DEPT VISIT HI MDM: CPT | Performed by: EMERGENCY MEDICINE

## 2025-03-12 PROCEDURE — 73521 X-RAY EXAM HIPS BI 2 VIEWS: CPT

## 2025-03-12 PROCEDURE — 70450 CT HEAD/BRAIN W/O DYE: CPT

## 2025-03-12 NOTE — ED NOTES
Per Hue, ED Tech, pt walked steadily with walker. Round trip ride cancelled. Lyft ordered for pt and pt will use walker from ED.      Kaylee Bernal RN  03/12/25 [id number]

## 2025-03-12 NOTE — ED NOTES
Upon arrival to shift pt sleeping soundly with mostly eaten lunch box at bedside and television on. Respirations regular. Door to room open for visualization.     Kaylee Bernal RN  03/12/25 7233

## 2025-03-12 NOTE — DISCHARGE INSTRUCTIONS
You have 1 rib fracture which will heal on its own.  Please continue 975 mg of Tylenol for pain.    Follow up with your primary care provider    Return to ER if develop any new or worrisome symptoms.

## 2025-03-12 NOTE — ED PROVIDER NOTES
Time reflects when diagnosis was documented in both MDM as applicable and the Disposition within this note       Time User Action Codes Description Comment    3/12/2025  6:13 AM Seven Severino [W19.XXXA] Fall, initial encounter     3/12/2025  6:13 AM Seven Severino [S22.32XA] Closed fracture of one rib of left side, initial encounter     3/12/2025  6:13 AM Seven Severino [F10.10] Alcohol abuse     3/12/2025  6:13 AM Seven Severino [S09.90XA] Closed head injury, initial encounter           ED Disposition       ED Disposition   Discharge    Condition   Stable    Date/Time   Wed Mar 12, 2025  6:13 AM    Comment   Gregg Partida discharge to home/self care.                   Assessment & Plan       Medical Decision Making  64-year-old male presenting to the ED today with fall.  Patient is intoxicated here on examination.  Given that I cannot clear him clinically will do CT head and CT C-spine to evaluate.  Will do x-rays of the left shoulder as well as x-rays of bilateral hips.  If all negative imaging studies will plan to discharge home.  Will need to be able to get up and walk around on his own in order to be discharged.    Amount and/or Complexity of Data Reviewed  Radiology: ordered and independent interpretation performed.        ED Course as of 03/12/25 0615   Wed Mar 12, 2025   0614 Patient eating turkey sandwich at bedside with no difficulty.       Medications - No data to display    ED Risk Strat Scores                                                History of Present Illness       Chief Complaint   Patient presents with    Fall     Pt states he fell at 2 am and neighbors called the ambulance.  Pt brought in by ems.  Pt admits to drinking and hitting his nose when he fell       Past Medical History:   Diagnosis Date    Acute on chronic kidney failure  (HCC)     Alcohol abuse     Alcohol withdrawal (HCC) 06/07/2019    Atrial fibrillation (HCC)     Cancer (HCC)     prostate ca,had radiation     Cardiac disease     stents,then triple bypass    COPD (chronic obstructive pulmonary disease) ([facility])     Coronary artery disease     ETOH abuse     Heart failure ([facility])     History of heart surgery     says triple bypass [facility]    Hx of heart artery stent     2014    Hyperlipidemia     Hypertension     Hyponatremia 10/17/2019    Hypovolemic shock ([facility]) 12/22/2019    Lumbar spondylitis ([facility]) 10/13/2022    Metabolic acidosis, increased anion gap 04/21/2021    Nasal bone fracture 10/10/2022    Prostate CA ([facility])     S/P CABG x 3     2004    Sleep apnea       Past Surgical History:   Procedure Laterality Date    CARDIAC CATHETERIZATION      2 stents    CORONARY ARTERY BYPASS GRAFT      CORONARY ARTERY BYPASS GRAFT  2004    [place] ARTHRD ANT INTERBODY MIN DSC CRV BELOW C2 N/A 12/16/2020    Procedure: Anterior cervical discectomy with fusion C4-C7; Posterior cervical decompression and fusion C2-T2;  Surgeon: David Rowell MD;  Location: [facility];  Service: Neurosurgery    TONSILLECTOMY        Family History   Problem Relation Age of Onset    Diabetes Mother     Uterine cancer Mother     COPD Father     Hypertension Father       Social History     Tobacco Use    Smoking status: Every Day     Current packs/day: 1.50     Average packs/day: 1.5 packs/day for 40.0 years (60.0 ttl pk-yrs)     Types: Cigarettes    Smokeless tobacco: Never   Vaping Use    Vaping status: Never Used   Substance Use Topics    Alcohol use: Yes     Alcohol/week: 4.0 standard drinks of alcohol     Types: 4 Standard drinks or equivalent per week     Comment: quart of vodka daily    Drug use: No      E-Cigarette/Vaping    E-Cigarette Use Never User       E-Cigarette/Vaping Substances    Nicotine Yes     THC No     CBD No     Flavoring No     Other No     Unknown No       I have reviewed and agree with the history as documented.     64-year-old male past medical history significant for alcohol abuse presenting to the ED today for fall.  EMS states that  neighbors called ambulance given patient had fallen.  Patient states that he had no complaints at this time.  No fevers chills no abdominal pain.  No chest pain.        Review of Systems   Constitutional:  Negative for chills and fever.   HENT:  Negative for hearing loss.    Eyes:  Negative for visual disturbance.   Respiratory:  Negative for shortness of breath.    Cardiovascular:  Negative for chest pain.   Gastrointestinal:  Negative for abdominal pain, constipation, diarrhea, nausea and vomiting.   Genitourinary:  Negative for difficulty urinating.   Musculoskeletal:  Negative for myalgias.   Skin:  Negative for rash.   Neurological:  Negative for dizziness.   Psychiatric/Behavioral:  Negative for agitation.    All other systems reviewed and are negative.          Objective       ED Triage Vitals [03/12/25 0517]   Temperature Pulse Blood Pressure Respirations SpO2 Patient Position - Orthostatic VS   97.7 °F (36.5 °C) 63 (!) 88/63 16 94 % Lying      Temp Source Heart Rate Source BP Location FiO2 (%) Pain Score    Oral Monitor Left arm -- --      Vitals      Date and Time Temp Pulse SpO2 Resp BP Pain Score FACES Pain Rating User   03/12/25 0600 -- -- -- 16 87/56 -- -- SD   03/12/25 0517 97.7 °F (36.5 °C) 63 94 % 16 88/63 -- -- SD            Physical Exam  Vitals and nursing note reviewed.   Constitutional:       General: He is not in acute distress.     Appearance: Normal appearance. He is not ill-appearing.   HENT:      Head: Normocephalic.      Right Ear: External ear normal.      Left Ear: External ear normal.      Nose: Nose normal. No congestion.      Mouth/Throat:      Mouth: Mucous membranes are moist.      Pharynx: Oropharynx is clear. No oropharyngeal exudate.   Eyes:      General:         Right eye: No discharge.         Left eye: No discharge.      Extraocular Movements: Extraocular movements intact.      Conjunctiva/sclera: Conjunctivae normal.      Pupils: Pupils are equal, round, and reactive to  light.   Cardiovascular:      Rate and Rhythm: Normal rate and regular rhythm.      Pulses: Normal pulses.      Heart sounds: Normal heart sounds.   Pulmonary:      Effort: Pulmonary effort is normal. No respiratory distress.      Breath sounds: Normal breath sounds. No wheezing.   Abdominal:      General: Abdomen is flat. Bowel sounds are normal. There is no distension.      Palpations: Abdomen is soft.      Tenderness: There is no abdominal tenderness.   Musculoskeletal:         General: No swelling or deformity. Normal range of motion.      Cervical back: Normal range of motion. No rigidity.      Comments: No C, T, L-spine tenderness.  Patient is complaining of pain in his left shoulder with movement.  Pelvis stable.  No pain in lower extremities.  Right upper extremity without any pain with movement.   Skin:     General: Skin is warm and dry.      Capillary Refill: Capillary refill takes less than 2 seconds.      Comments: Skin tear right arm.  Abrasion to bridge of nose   Neurological:      General: No focal deficit present.      Mental Status: He is alert and oriented to person, place, and time. Mental status is at baseline.      Cranial Nerves: No cranial nerve deficit.      Motor: No weakness.   Psychiatric:         Mood and Affect: Mood normal.         Behavior: Behavior normal.         Results Reviewed       None            XR shoulder 2+ views LEFT   ED Interpretation by Seven Severino MD (03/12 0602)   Upon my interpretation of this x-ray there does appear to be 1 rib fracture.  Artificial intelligence seems to be reading a lesion at this However does not appear to be fracture.  Looks more to be calcifications.      CT head without contrast   Final Interpretation by Nereyda Sotelo MD (03/12 0607)      No acute intracranial abnormality.                  Workstation performed: [id number]         CT spine cervical without contrast   Final Interpretation by Nereyda Sotelo MD (03/12 0609)      No  cervical spine fracture within study limitations.      Postsurgical changes as described above.            Workstation performed: [id number]         XR hips bilateral 2 vw w pelvis if performed    (Results Pending)       Procedures    ED Medication and Procedure Management   Prior to Admission Medications   Prescriptions Last Dose Informant Patient Reported? Taking?   Blood Glucose Monitoring Suppl (ONE TOUCH ULTRA MINI) w/Device KIT  Self Yes No   Sig: Use as directed   Patient not taking: Reported on 3/25/2024   Blood Pressure Monitoring (Adult Blood Pressure Cuff Lg) KIT   No No   Sig: Use in the morning   Patient not taking: Reported on 3/25/2024   Breo Ellipta 200-25 MCG/ACT inhaler  Self No No   Sig: Inhale 1 puff daily Rinse mouth after use.   ONETOUCH DELICA LANCETS FINE MISC  Self Yes No   Sig: 3 (three) times a day Test   Thiamine HCl (vitamin B-1) 100 MG TABS  Self No No   Sig: TAKE 1 TABLET DAILY   albuterol (Ventolin HFA) 90 mcg/act inhaler  Self No No   Sig: Inhale 2 puffs every 4 (four) hours as needed for wheezing   aluminum-magnesium hydroxide-simethicone (MAALOX) 5606-0548-995 mg/30 mL suspension   No No   Sig: Take 30 mL by mouth every 4 (four) hours as needed for indigestion or heartburn   apixaban (Eliquis) 5 mg   No No   Sig: TAKE 1 TABLET BY MOUTH 2 TIMES A DAY DO NOT START BEFORE JANUARY 31, 2024.   aspirin (ECOTRIN LOW STRENGTH) 81 mg EC tablet  Self Yes No   Sig: Take 81 mg by mouth daily   diltiazem (CARDIZEM SR) 120 mg 12 hr capsule   No No   Sig: Take 1 capsule (120 mg total) by mouth every 12 (twelve) hours   ferrous sulfate 325 (65 Fe) mg tablet  Self No No   Sig: Take 1 tablet (325 mg total) by mouth daily with breakfast   folic acid (FOLVITE) 1 mg tablet   No No   Sig: TAKE 1 TABLET DAILY   gabapentin (NEURONTIN) 300 mg capsule   No No   Sig: TAKE ONE CAPSULE THREE TIMES DAILY   lidocaine (Lidoderm) 5 %   No No   Sig: Apply 1 patch topically over 12 hours daily Remove & Discard patch  within 12 hours or as directed by MD   magnesium Oxide (MAG-OX) 400 mg TABS   No No   Sig: Take 2 tablets (800 mg total) by mouth 2 (two) times a day   metFORMIN (GLUCOPHAGE) 500 mg tablet   No No   Sig: TAKE 1 TABLET DAILY WITH BREAKFAST   metoprolol tartrate (LOPRESSOR) 100 mg tablet   No No   Sig: Take 1.5 tablets (150 mg total) by mouth every 12 (twelve) hours   nicotine (NICODERM CQ) 21 mg/24 hr TD 24 hr patch  Self No No   Sig: Place 1 patch on the skin over 24 hours daily Do not start before December 4, 2023.   pantoprazole (PROTONIX) 40 mg tablet   No No   Sig: TAKE 1 TABLET TWO TIMES A DAY   ranolazine (RANEXA) 500 mg 12 hr tablet   No No   Sig: TAKE 1 TABLET EVERY 12 HOURS   senna-docusate sodium (SENOKOT S) 8.6-50 mg per tablet   No No   Sig: Take 1 tablet by mouth daily as needed for constipation   tamsulosin (FLOMAX) 0.4 mg   No No   Sig: TAKE 1 CAPSULE DAILY      Facility-Administered Medications: None     Patient's Medications   Discharge Prescriptions    No medications on file     No discharge procedures on file.  ED SEPSIS DOCUMENTATION   Time reflects when diagnosis was documented in both MDM as applicable and the Disposition within this note       Time User Action Codes Description Comment    3/12/2025  6:13 AM Seven Severino [W19.XXXA] Fall, initial encounter     3/12/2025  6:13 AM Seven Severino [S22.32XA] Closed fracture of one rib of left side, initial encounter     3/12/2025  6:13 AM Seven Severino [F10.10] Alcohol abuse     3/12/2025  6:13 AM Seven Severino [S09.90XA] Closed head injury, initial encounter                  Seven Severino MD  03/12/25 0615

## 2025-03-21 NOTE — TELEPHONE ENCOUNTER
Sure thing, [Obstructive Sleep Apnea] : obstructive sleep apnea [Moderate] : moderate in severity [Not Responding to Treatment] : not responding to treatment [Alcohol Avoidance] : alcohol avoidance [Sedative Avoidance] : sedative avoidance [Weight Loss Program] : weight loss program [CPAP] : CPAP [FreeTextEntry1] : Patient continue failure appears mostly due to mask fit.  I once again fitted the patient with a small F30 I fullface mask and supplied him with a sample.  If this fails patient will undergo BiPAP titration [de-identified] : Complicated by severe air leak [de-identified] : Patient is satisfied with his response and does not want to pursue any other changes in mask or testing

## 2025-03-27 ENCOUNTER — HOSPITAL ENCOUNTER (EMERGENCY)
Facility: HOSPITAL | Age: 65
Discharge: HOME | End: 2025-03-28
Attending: EMERGENCY MEDICINE
Payer: COMMERCIAL

## 2025-03-27 ENCOUNTER — APPOINTMENT (EMERGENCY)
Dept: RADIOLOGY | Facility: HOSPITAL | Age: 65
End: 2025-03-27
Payer: COMMERCIAL

## 2025-03-27 DIAGNOSIS — T14.8XXA ABRASION: ICD-10-CM
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
## 2025-03-27 DIAGNOSIS — F10.929 ALCOHOL INTOXICATION (HCC): Primary | ICD-10-CM

## 2025-03-27 PROCEDURE — 73080 X-RAY EXAM OF ELBOW: CPT

## 2025-03-27 PROCEDURE — 72125 CT NECK SPINE W/O DYE: CPT

## 2025-03-27 PROCEDURE — 99284 EMERGENCY DEPT VISIT MOD MDM: CPT | Performed by: EMERGENCY MEDICINE

## 2025-03-27 PROCEDURE — 70450 CT HEAD/BRAIN W/O DYE: CPT

## 2025-03-27 PROCEDURE — 99284 EMERGENCY DEPT VISIT MOD MDM: CPT

## 2025-03-27 RX ORDER — GINSENG 100 MG
1 CAPSULE ORAL ONCE
Status: COMPLETED | OUTPATIENT
Start: 2025-03-27 | End: 2025-03-27

## 2025-03-27 RX ADMIN — BACITRACIN ZINC 1 SMALL APPLICATION: 500 OINTMENT TOPICAL at 22:41

## 2025-03-28 VITALS
SYSTOLIC BLOOD PRESSURE: 170 MMHG | OXYGEN SATURATION: 91 % | DIASTOLIC BLOOD PRESSURE: 85 MMHG | TEMPERATURE: 97.8 F | HEART RATE: 113 BPM | RESPIRATION RATE: 18 BRPM

## 2025-03-28 NOTE — ED PROVIDER NOTES
Time reflects when diagnosis was documented in both MDM as applicable and the Disposition within this note       Time User Action Codes Description Comment    3/27/2025 11:45 PM Mili Patel Add [F10.929] Alcohol intoxication (HCC)     3/27/2025 11:45 PM Mili Patel Add [W19.XXXA] Fall, initial encounter     3/27/2025 11:45 PM Mili Patel [T14.8XXA] Abrasion           ED Disposition       None          Assessment & Plan       Medical Decision Making  Pt is a 65yo M who presents with fall.     Differential diagnosis to include but not limited to ICH, fracture, dislocation.  Will plan for CT head and C-spine as well as right elbow x-ray.  If traumatic workup negative, patient will require sober ride or will monitor until clinically sober prior to discharge.  See ED course for results and details.    Still awaiting sobriety at time of sign out. Pt signed out to oncoming physician.        Amount and/or Complexity of Data Reviewed  Radiology: ordered. Decision-making details documented in ED Course.    Risk  OTC drugs.        ED Course as of 03/28/25 0716   Thu Mar 27, 2025   2237 Per chart review, tetanus up to date as of 2020.    2311 XR elbow 3+ vw RIGHT  No acute osseous abnormality on wet read.    2316 CT head wo contrast  No acute intracranial abnormality.   2320 CT spine cervical without contrast  No evidence of acute cervical spine fracture or traumatic malalignment.   2344 Pt reporting that he does not have anyone to call for sober ride. Pt made aware that he will need to be monitored in the ED until clinically sober. Pt agreeable if provided with food. Will give sandwich.    Fri Mar 28, 2025   0121 Pt sleeping comfortably.        Medications   bacitracin topical ointment 1 small application (1 small application Topical Given 3/27/25 2241)       ED Risk Strat Scores                            SBIRT 20yo+      Flowsheet Row Most Recent Value   Initial Alcohol Screen: US AUDIT-C     1. How  often do you have a drink containing alcohol? 6 Filed at: 03/27/2025 2235   2. How many drinks containing alcohol do you have on a typical day you are drinking?  5 Filed at: 03/27/2025 2235   3a. Male UNDER 65: How often do you have five or more drinks on one occasion? 0 Filed at: 03/27/2025 2235   3b. FEMALE Any Age, or MALE 65+: How often do you have 4 or more drinks on one occassion? 0 Filed at: 03/27/2025 2235   Audit-C Score 11 Filed at: 03/27/2025 2235   Full Alcohol Screen: US AUDIT    4. How often during the last year have you found that you were not able to stop drinking once you had started? 0 Filed at: 03/27/2025 2235   5. How often during past year have you failed to do what was normally expected of you because of drinking?  0 Filed at: 03/27/2025 2235   6. How often in past year have you needed a first drink in the morning to get yourself going after a heavy drinking session?  0 Filed at: 03/27/2025 2235   7. How often in past year have you had feeling of guilt or remorse after drinking?  0 Filed at: 03/27/2025 2235   8. How often in past year have you been unable to remember what happened night before because you had been drinking?  0 Filed at: 03/27/2025 2235   9. Have you or someone else been injured as a result of your drinking?  0 Filed at: 03/27/2025 2235   10. Has a relative, friend, doctor or other health worker been concerned about your drinking and suggested you cut down?  0 Filed at: 03/27/2025 2235   AUDIT Total Score 11 Filed at: 03/27/2025 2235   BRIGIDA: How many times in the past year have you...    Used an illegal drug or used a prescription medication for non-medical reasons? Never Filed at: 03/27/2025 2235                            History of Present Illness       Chief Complaint   Patient presents with    Fall     Pt. Arrives VIA ems for a reported fall. Patient on thinners, reporting no pain       Past Medical History:   Diagnosis Date    Acute on chronic kidney failure  (HCC)      Alcohol abuse     Alcohol withdrawal (HCC) 06/07/2019    Atrial fibrillation (HCC)     Cancer (HCC)     prostate ca,had radiation    Cardiac disease     stents,then triple bypass    COPD (chronic obstructive pulmonary disease) (HCC)     Coronary artery disease     ETOH abuse     Heart failure (HCC)     History of heart surgery     says triple bypass [facility]    Hx of heart artery stent     2014    Hyperlipidemia     Hypertension     Hyponatremia 10/17/2019    Hypovolemic shock (HCC) 12/22/2019    Lumbar spondylitis ([facility]) 10/13/2022    Metabolic acidosis, increased anion gap 04/21/2021    Nasal bone fracture 10/10/2022    Prostate CA ([facility])     S/P CABG x 3     2004    Sleep apnea       Past Surgical History:   Procedure Laterality Date    CARDIAC CATHETERIZATION      2 stents    CORONARY ARTERY BYPASS GRAFT      CORONARY ARTERY BYPASS GRAFT  2004    AL ARTHRD ANT INTERBODY MIN DSC CRV BELOW C2 N/A 12/16/2020    Procedure: Anterior cervical discectomy with fusion C4-C7; Posterior cervical decompression and fusion C2-T2;  Surgeon: David Rowell MD;  Location: [facility];  Service: Neurosurgery    TONSILLECTOMY        Family History   Problem Relation Age of Onset    Diabetes Mother     Uterine cancer Mother     COPD Father     Hypertension Father       Social History     Tobacco Use    Smoking status: Every Day     Current packs/day: 1.50     Average packs/day: 1.5 packs/day for 40.0 years (60.0 ttl pk-yrs)     Types: Cigarettes    Smokeless tobacco: Never   Vaping Use    Vaping status: Never Used   Substance Use Topics    Alcohol use: Yes     Alcohol/week: 4.0 standard drinks of alcohol     Types: 4 Standard drinks or equivalent per week     Comment: quart of vodka daily    Drug use: No      E-Cigarette/Vaping    E-Cigarette Use Never User       E-Cigarette/Vaping Substances    Nicotine Yes     THC No     CBD No     Flavoring No     Other No     Unknown No       I have reviewed and agree with the history as  "documented.     Pt is a 63yo M who presents for fall.  Patient reports that he fell at his home secondary to being intoxicated.  Patient reports he had \"a bit\" of alcohol to drink today.  Patient is known to this department for frequent alcohol use.  Patient reports he did strike his head when he fell but did not lose consciousness.  Patient denying any pain or complaints at this time.            Objective       ED Triage Vitals [03/27/25 2234]   Temperature Pulse Blood Pressure Respirations SpO2 Patient Position - Orthostatic VS   97.8 °F (36.6 °C) 83 132/68 18 98 % Lying      Temp Source Heart Rate Source BP Location FiO2 (%) Pain Score    Oral Monitor Right arm -- --      Vitals      Date and Time Temp Pulse SpO2 Resp BP Pain Score FACES Pain Rating User   03/27/25 2234 97.8 °F (36.6 °C) 83 98 % 18 132/68 -- -- DD            Physical Exam  Vitals reviewed.   Constitutional:       Appearance: He is well-developed. He is not toxic-appearing or diaphoretic.   HENT:      Head: Normocephalic and atraumatic.      Right Ear: External ear normal.      Left Ear: External ear normal.      Nose: Nose normal.      Mouth/Throat:      Pharynx: Oropharynx is clear.   Eyes:      Extraocular Movements: Extraocular movements intact.      Pupils: Pupils are equal, round, and reactive to light.   Cardiovascular:      Rate and Rhythm: Normal rate and regular rhythm.      Heart sounds: Normal heart sounds.   Pulmonary:      Effort: Pulmonary effort is normal. No respiratory distress.      Breath sounds: Normal breath sounds.   Abdominal:      General: Bowel sounds are normal. There is no distension.      Palpations: Abdomen is soft.      Tenderness: There is no abdominal tenderness.   Musculoskeletal:         General: No tenderness or deformity. Normal range of motion.      Cervical back: Neck supple. No tenderness.   Skin:     General: Skin is warm and dry.      Capillary Refill: Capillary refill takes less than 2 seconds.      " Comments: Old appearing scabs as well as new abrasion noted to R elbow; No active bleeding   Neurological:      General: No focal deficit present.      Mental Status: He is alert and oriented to person, place, and time.      Cranial Nerves: No cranial nerve deficit.      Sensory: No sensory deficit.   Psychiatric:         Speech: Speech is slurred.         Behavior: Behavior is cooperative.         Results Reviewed       None            CT spine cervical without contrast   Final Interpretation by Bhavin Carnes MD (03/27 2318)      No evidence of acute cervical spine fracture or traumatic malalignment.                  Workstation performed: [id number]         CT head wo contrast   Final Interpretation by Bhavin Carnes MD (03/27 2315)      No acute intracranial abnormality.                  Workstation performed: [id number]         XR elbow 3+ vw RIGHT    (Results Pending)       Procedures    ED Medication and Procedure Management   Prior to Admission Medications   Prescriptions Last Dose Informant Patient Reported? Taking?   Blood Glucose Monitoring Suppl (ONE TOUCH ULTRA MINI) w/Device KIT  Self Yes No   Sig: Use as directed   Patient not taking: Reported on 3/25/2024   Blood Pressure Monitoring (Adult Blood Pressure Cuff Lg) KIT   No No   Sig: Use in the morning   Patient not taking: Reported on 3/25/2024   Breo Ellipta 200-25 MCG/ACT inhaler  Self No No   Sig: Inhale 1 puff daily Rinse mouth after use.   ONETOUCH DELICA LANCETS FINE MISC  Self Yes No   Sig: 3 (three) times a day Test   Thiamine HCl (vitamin B-1) 100 MG TABS  Self No No   Sig: TAKE 1 TABLET DAILY   albuterol (Ventolin HFA) 90 mcg/act inhaler  Self No No   Sig: Inhale 2 puffs every 4 (four) hours as needed for wheezing   aluminum-magnesium hydroxide-simethicone (MAALOX) 7330-4545-720 mg/30 mL suspension   No No   Sig: Take 30 mL by mouth every 4 (four) hours as needed for indigestion or heartburn   apixaban (Eliquis) 5 mg   No No   Sig:  TAKE 1 TABLET BY MOUTH 2 TIMES A DAY DO NOT START BEFORE JANUARY 31, 2024.   aspirin (ECOTRIN LOW STRENGTH) 81 mg EC tablet  Self Yes No   Sig: Take 81 mg by mouth daily   diltiazem (CARDIZEM SR) 120 mg 12 hr capsule   No No   Sig: Take 1 capsule (120 mg total) by mouth every 12 (twelve) hours   ferrous sulfate 325 (65 Fe) mg tablet  Self No No   Sig: Take 1 tablet (325 mg total) by mouth daily with breakfast   folic acid (FOLVITE) 1 mg tablet   No No   Sig: TAKE 1 TABLET DAILY   gabapentin (NEURONTIN) 300 mg capsule   No No   Sig: TAKE ONE CAPSULE THREE TIMES DAILY   lidocaine (Lidoderm) 5 %   No No   Sig: Apply 1 patch topically over 12 hours daily Remove & Discard patch within 12 hours or as directed by MD   magnesium Oxide (MAG-OX) 400 mg TABS   No No   Sig: Take 2 tablets (800 mg total) by mouth 2 (two) times a day   metFORMIN (GLUCOPHAGE) 500 mg tablet   No No   Sig: TAKE 1 TABLET DAILY WITH BREAKFAST   metoprolol tartrate (LOPRESSOR) 100 mg tablet   No No   Sig: Take 1.5 tablets (150 mg total) by mouth every 12 (twelve) hours   nicotine (NICODERM CQ) 21 mg/24 hr TD 24 hr patch  Self No No   Sig: Place 1 patch on the skin over 24 hours daily Do not start before December 4, 2023.   pantoprazole (PROTONIX) 40 mg tablet   No No   Sig: TAKE 1 TABLET TWO TIMES A DAY   ranolazine (RANEXA) 500 mg 12 hr tablet   No No   Sig: TAKE 1 TABLET EVERY 12 HOURS   senna-docusate sodium (SENOKOT S) 8.6-50 mg per tablet   No No   Sig: Take 1 tablet by mouth daily as needed for constipation   tamsulosin (FLOMAX) 0.4 mg   No No   Sig: TAKE 1 CAPSULE DAILY      Facility-Administered Medications: None     Patient's Medications   Discharge Prescriptions    No medications on file     No discharge procedures on file.  ED SEPSIS DOCUMENTATION   Time reflects when diagnosis was documented in both MDM as applicable and the Disposition within this note       Time User Action Codes Description Comment    3/27/2025 11:45 PM Mili Patel  Add [F10.929] Alcohol intoxication (HCC)     3/27/2025 11:45 PM Mili Patel [W19.XXXA] Fall, initial encounter     3/27/2025 11:45 PM Mili Patel [T14.8XXA] Abrasion                  Mili Patel MD  03/28/25 0716

## 2025-03-28 NOTE — ED NOTES
Patient resting in ED hallway bed with both side rails up, respirations non-labored, equal rise and chest fall     Faith Chavez RN  03/28/25 4832

## 2025-03-28 NOTE — ED NOTES
X-ray @ bedside      Faith Chavez RN  11/04/24 [id number]    
Left UE ROM was WNL (within normal limits)/Right UE ROM was WNL (within normal limits)/Left LE ROM was WFL (within functional limits)/Right LE ROM was WFL (within functional limits)

## 2025-04-05 ENCOUNTER — APPOINTMENT (EMERGENCY)
Dept: RADIOLOGY | Facility: HOSPITAL | Age: 65
End: 2025-04-05
Payer: COMMERCIAL

## 2025-04-05 ENCOUNTER — HOSPITAL ENCOUNTER (INPATIENT)
Facility: HOSPITAL | Age: 65
LOS: 4 days | Discharge: HOME | End: 2025-04-09
Attending: EMERGENCY MEDICINE | Admitting: FAMILY MEDICINE
Payer: COMMERCIAL

## 2025-04-05 DIAGNOSIS — I48.91 ATRIAL FIBRILLATION WITH RAPID VENTRICULAR RESPONSE (HCC): ICD-10-CM
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
## 2025-04-05 DIAGNOSIS — R19.7 DIARRHEA, UNSPECIFIED TYPE: ICD-10-CM

## 2025-04-05 DIAGNOSIS — E87.1 HYPONATREMIA: ICD-10-CM

## 2025-04-05 DIAGNOSIS — I95.9 HYPOTENSION: ICD-10-CM

## 2025-04-05 DIAGNOSIS — F10.939 ALCOHOL WITHDRAWAL (HCC): ICD-10-CM

## 2025-04-05 DIAGNOSIS — N17.9 AKI (ACUTE KIDNEY INJURY) (HCC): ICD-10-CM

## 2025-04-05 DIAGNOSIS — E83.42 HYPOMAGNESEMIA: ICD-10-CM

## 2025-04-05 LAB
2HR DELTA HS TROPONIN: -10 NG/L
ALBUMIN SERPL BCG-MCNC: 3.9 G/DL (ref 3.5–5)
ALP SERPL-CCNC: 130 U/L (ref 34–104)
ALT SERPL W P-5'-P-CCNC: 41 U/L (ref 7–52)
ANION GAP SERPL CALCULATED.3IONS-SCNC: 32 MMOL/L (ref 4–13)
ANISOCYTOSIS BLD QL SMEAR: PRESENT
AST SERPL W P-5'-P-CCNC: 75 U/L (ref 13–39)
ATRIAL RATE: 120 BPM
BASOPHILS # BLD AUTO: 0.03 THOUSANDS/ÂΜL (ref 0–0.1)
BASOPHILS NFR BLD AUTO: 1 % (ref 0–1)
BILIRUB SERPL-MCNC: 1.76 MG/DL (ref 0.2–1)
BUN SERPL-MCNC: 41 MG/DL (ref 5–25)
CALCIUM SERPL-MCNC: 8.6 MG/DL (ref 8.4–10.2)
CARDIAC TROPONIN I PNL SERPL HS: 44 NG/L (ref ?–50)
CARDIAC TROPONIN I PNL SERPL HS: 54 NG/L (ref ?–50)
CHLORIDE SERPL-SCNC: 87 MMOL/L (ref 96–108)
CO2 SERPL-SCNC: 12 MMOL/L (ref 21–32)
CREAT SERPL-MCNC: 1.87 MG/DL (ref 0.6–1.3)
EOSINOPHIL # BLD AUTO: 0.01 THOUSAND/ÂΜL (ref 0–0.61)
EOSINOPHIL NFR BLD AUTO: 0 % (ref 0–6)
ERYTHROCYTE [DISTWIDTH] IN BLOOD BY AUTOMATED COUNT: 19.2 % (ref 11.6–15.1)
ETHANOL SERPL-MCNC: <10 MG/DL
GFR SERPL CREATININE-BSD FRML MDRD: 37 ML/MIN/1.73SQ M
GLUCOSE SERPL-MCNC: 70 MG/DL (ref 65–140)
HCT VFR BLD AUTO: 35 % (ref 36.5–49.3)
HGB BLD-MCNC: 11.5 G/DL (ref 12–17)
IMM GRANULOCYTES # BLD AUTO: 0.03 THOUSAND/UL (ref 0–0.2)
IMM GRANULOCYTES NFR BLD AUTO: 1 % (ref 0–2)
LACTATE SERPL-SCNC: 1.1 MMOL/L (ref 0.5–2)
LACTATE SERPL-SCNC: 4.3 MMOL/L (ref 0.5–2)
LIPASE SERPL-CCNC: 74 U/L (ref 11–82)
LYMPHOCYTES # BLD AUTO: 0.66 THOUSANDS/ÂΜL (ref 0.6–4.47)
LYMPHOCYTES NFR BLD AUTO: 12 % (ref 14–44)
MAGNESIUM SERPL-MCNC: 1 MG/DL (ref 1.9–2.7)
MCH RBC QN AUTO: 30.1 PG (ref 26.8–34.3)
MCHC RBC AUTO-ENTMCNC: 32.9 G/DL (ref 31.4–37.4)
MCV RBC AUTO: 92 FL (ref 82–98)
MONOCYTES # BLD AUTO: 0.92 THOUSAND/ÂΜL (ref 0.17–1.22)
MONOCYTES NFR BLD AUTO: 16 % (ref 4–12)
NEUTROPHILS # BLD AUTO: 4.11 THOUSANDS/ÂΜL (ref 1.85–7.62)
NEUTS SEG NFR BLD AUTO: 70 % (ref 43–75)
NRBC BLD AUTO-RTO: 1 /100 WBCS
PLATELET # BLD AUTO: 55 THOUSANDS/UL (ref 149–390)
PLATELET BLD QL SMEAR: ABNORMAL
PMV BLD AUTO: 11.2 FL (ref 8.9–12.7)
POTASSIUM SERPL-SCNC: 4.8 MMOL/L (ref 3.5–5.3)
PROT SERPL-MCNC: 7.3 G/DL (ref 6.4–8.4)
QRS AXIS: 98 DEGREES
QRSD INTERVAL: 80 MS
QT INTERVAL: 348 MS
QTC INTERVAL: 481 MS
RBC # BLD AUTO: 3.82 MILLION/UL (ref 3.88–5.62)
RBC MORPH BLD: PRESENT
SODIUM SERPL-SCNC: 131 MMOL/L (ref 135–147)
T WAVE AXIS: 41 DEGREES
VENTRICULAR RATE: 115 BPM
WBC # BLD AUTO: 5.76 THOUSAND/UL (ref 4.31–10.16)

## 2025-04-05 PROCEDURE — 83605 ASSAY OF LACTIC ACID: CPT | Performed by: EMERGENCY MEDICINE

## 2025-04-05 PROCEDURE — 82077 ASSAY SPEC XCP UR&BREATH IA: CPT | Performed by: EMERGENCY MEDICINE

## 2025-04-05 PROCEDURE — 96361 HYDRATE IV INFUSION ADD-ON: CPT

## 2025-04-05 PROCEDURE — 83690 ASSAY OF LIPASE: CPT | Performed by: EMERGENCY MEDICINE

## 2025-04-05 PROCEDURE — 93005 ELECTROCARDIOGRAM TRACING: CPT

## 2025-04-05 PROCEDURE — 80053 COMPREHEN METABOLIC PANEL: CPT | Performed by: EMERGENCY MEDICINE

## 2025-04-05 PROCEDURE — 85025 COMPLETE CBC W/AUTO DIFF WBC: CPT | Performed by: EMERGENCY MEDICINE

## 2025-04-05 PROCEDURE — 99285 EMERGENCY DEPT VISIT HI MDM: CPT

## 2025-04-05 PROCEDURE — 96365 THER/PROPH/DIAG IV INF INIT: CPT

## 2025-04-05 PROCEDURE — 72125 CT NECK SPINE W/O DYE: CPT

## 2025-04-05 PROCEDURE — 96375 TX/PRO/DX INJ NEW DRUG ADDON: CPT

## 2025-04-05 PROCEDURE — 83735 ASSAY OF MAGNESIUM: CPT | Performed by: EMERGENCY MEDICINE

## 2025-04-05 PROCEDURE — 71045 X-RAY EXAM CHEST 1 VIEW: CPT

## 2025-04-05 PROCEDURE — 70450 CT HEAD/BRAIN W/O DYE: CPT

## 2025-04-05 PROCEDURE — 36415 COLL VENOUS BLD VENIPUNCTURE: CPT | Performed by: EMERGENCY MEDICINE

## 2025-04-05 PROCEDURE — 99291 CRITICAL CARE FIRST HOUR: CPT | Performed by: EMERGENCY MEDICINE

## 2025-04-05 PROCEDURE — 84484 ASSAY OF TROPONIN QUANT: CPT | Performed by: EMERGENCY MEDICINE

## 2025-04-05 RX ORDER — DILTIAZEM HYDROCHLORIDE 60 MG/1
120 CAPSULE, EXTENDED RELEASE ORAL EVERY 12 HOURS SCHEDULED
Status: CANCELLED | OUTPATIENT
Start: 2025-04-05

## 2025-04-05 RX ORDER — ASPIRIN 81 MG/1
81 TABLET, CHEWABLE ORAL DAILY
Status: DISCONTINUED | OUTPATIENT
Start: 2025-04-06 | End: 2025-04-09 | Stop reason: HOSPADM

## 2025-04-05 RX ORDER — SODIUM CHLORIDE 9 MG/ML
75 INJECTION, SOLUTION INTRAVENOUS CONTINUOUS
Status: DISCONTINUED | OUTPATIENT
Start: 2025-04-05 | End: 2025-04-05

## 2025-04-05 RX ORDER — ALBUTEROL SULFATE 90 UG/1
2 INHALANT RESPIRATORY (INHALATION) EVERY 4 HOURS PRN
Status: DISCONTINUED | OUTPATIENT
Start: 2025-04-05 | End: 2025-04-09 | Stop reason: HOSPADM

## 2025-04-05 RX ORDER — LANOLIN ALCOHOL/MO/W.PET/CERES
800 CREAM (GRAM) TOPICAL 2 TIMES DAILY
Status: DISCONTINUED | OUTPATIENT
Start: 2025-04-06 | End: 2025-04-09 | Stop reason: HOSPADM

## 2025-04-05 RX ORDER — DIAZEPAM 10 MG/2ML
5 INJECTION, SOLUTION INTRAMUSCULAR; INTRAVENOUS ONCE
Status: COMPLETED | OUTPATIENT
Start: 2025-04-05 | End: 2025-04-05

## 2025-04-05 RX ORDER — MAGNESIUM HYDROXIDE/ALUMINUM HYDROXICE/SIMETHICONE 120; 1200; 1200 MG/30ML; MG/30ML; MG/30ML
30 SUSPENSION ORAL EVERY 4 HOURS PRN
Status: DISCONTINUED | OUTPATIENT
Start: 2025-04-05 | End: 2025-04-09 | Stop reason: HOSPADM

## 2025-04-05 RX ORDER — RANOLAZINE 500 MG/1
500 TABLET, EXTENDED RELEASE ORAL EVERY 12 HOURS SCHEDULED
Status: DISCONTINUED | OUTPATIENT
Start: 2025-04-06 | End: 2025-04-09 | Stop reason: HOSPADM

## 2025-04-05 RX ORDER — MAGNESIUM SULFATE HEPTAHYDRATE 40 MG/ML
2 INJECTION, SOLUTION INTRAVENOUS ONCE
Status: COMPLETED | OUTPATIENT
Start: 2025-04-05 | End: 2025-04-05

## 2025-04-05 RX ORDER — NICOTINE 21 MG/24HR
1 PATCH, TRANSDERMAL 24 HOURS TRANSDERMAL DAILY
Status: DISCONTINUED | OUTPATIENT
Start: 2025-04-06 | End: 2025-04-07

## 2025-04-05 RX ORDER — LIDOCAINE 50 MG/G
1 PATCH TOPICAL DAILY
Status: DISCONTINUED | OUTPATIENT
Start: 2025-04-06 | End: 2025-04-07

## 2025-04-05 RX ORDER — AMOXICILLIN 250 MG
1 CAPSULE ORAL DAILY PRN
Status: DISCONTINUED | OUTPATIENT
Start: 2025-04-05 | End: 2025-04-09 | Stop reason: HOSPADM

## 2025-04-05 RX ORDER — TAMSULOSIN HYDROCHLORIDE 0.4 MG/1
0.4 CAPSULE ORAL DAILY
Status: DISCONTINUED | OUTPATIENT
Start: 2025-04-06 | End: 2025-04-09 | Stop reason: HOSPADM

## 2025-04-05 RX ORDER — LANOLIN ALCOHOL/MO/W.PET/CERES
100 CREAM (GRAM) TOPICAL DAILY
Status: CANCELLED | OUTPATIENT
Start: 2025-04-06

## 2025-04-05 RX ORDER — FLUTICASONE FUROATE AND VILANTEROL 200; 25 UG/1; UG/1
1 POWDER RESPIRATORY (INHALATION) DAILY
Status: DISCONTINUED | OUTPATIENT
Start: 2025-04-06 | End: 2025-04-09 | Stop reason: HOSPADM

## 2025-04-05 RX ORDER — FERROUS SULFATE 325(65) MG
325 TABLET ORAL
Status: DISCONTINUED | OUTPATIENT
Start: 2025-04-06 | End: 2025-04-09 | Stop reason: HOSPADM

## 2025-04-05 RX ORDER — NICOTINE 21 MG/24HR
1 PATCH, TRANSDERMAL 24 HOURS TRANSDERMAL DAILY
Status: DISCONTINUED | OUTPATIENT
Start: 2025-04-06 | End: 2025-04-06

## 2025-04-05 RX ORDER — SODIUM CHLORIDE, SODIUM LACTATE, POTASSIUM CHLORIDE, CALCIUM CHLORIDE 600; 310; 30; 20 MG/100ML; MG/100ML; MG/100ML; MG/100ML
75 INJECTION, SOLUTION INTRAVENOUS CONTINUOUS
Status: DISCONTINUED | OUTPATIENT
Start: 2025-04-05 | End: 2025-04-07

## 2025-04-05 RX ORDER — PANTOPRAZOLE SODIUM 40 MG/1
40 TABLET, DELAYED RELEASE ORAL 2 TIMES DAILY
Status: DISCONTINUED | OUTPATIENT
Start: 2025-04-05 | End: 2025-04-09 | Stop reason: HOSPADM

## 2025-04-05 RX ORDER — FOLIC ACID 1 MG/1
1000 TABLET ORAL DAILY
Status: DISCONTINUED | OUTPATIENT
Start: 2025-04-06 | End: 2025-04-09 | Stop reason: HOSPADM

## 2025-04-05 RX ADMIN — PANTOPRAZOLE SODIUM 40 MG: 40 TABLET, DELAYED RELEASE ORAL at 23:27

## 2025-04-05 RX ADMIN — MAGNESIUM SULFATE HEPTAHYDRATE 2 G: 40 INJECTION, SOLUTION INTRAVENOUS at 19:59

## 2025-04-05 RX ADMIN — SODIUM CHLORIDE 1000 ML: 0.9 INJECTION, SOLUTION INTRAVENOUS at 20:00

## 2025-04-05 RX ADMIN — SODIUM CHLORIDE, SODIUM LACTATE, POTASSIUM CHLORIDE, AND CALCIUM CHLORIDE 75 ML/HR: .6; .31; .03; .02 INJECTION, SOLUTION INTRAVENOUS at 23:28

## 2025-04-05 RX ADMIN — SODIUM CHLORIDE 1000 ML: 0.9 INJECTION, SOLUTION INTRAVENOUS at 19:13

## 2025-04-05 RX ADMIN — SODIUM CHLORIDE 1000 ML: 0.9 INJECTION, SOLUTION INTRAVENOUS at 20:33

## 2025-04-05 RX ADMIN — DIAZEPAM 5 MG: 10 INJECTION, SOLUTION INTRAMUSCULAR; INTRAVENOUS at 19:43

## 2025-04-05 NOTE — ED NOTES
Pt came in with cigarettes which are at the bedside and a bottle of vodka which was dumped down the sink.      Rebecca Rodriguez RN  04/05/25 [id number]

## 2025-04-05 NOTE — ED PROVIDER NOTES
Time reflects when diagnosis was documented in both MDM as applicable and the Disposition within this note       Time User Action Codes Description Comment    4/5/2025  8:58 PM Quinn Krishna [W19.XXXA] Fall, initial encounter     4/5/2025  8:58 PM Quinn Krishna [F10.939] Alcohol withdrawal (HCC)     4/5/2025  9:07 PM Quinn Krishna [E83.42] Hypomagnesemia     4/5/2025  9:07 PM Quinn Krishna [N17.9] ENMA (acute kidney injury) (HCC)     4/5/2025  9:07 PM Quinn Krishna [I95.9] Hypotension           ED Disposition       None          Assessment & Plan       Medical Decision Making  Pulse ox 95% on room air indicating adequate oxygenation.  CXR: NAD as read by me      Differential diagnose include but not limited to arrhythmia, ACS, dehydration, intracranial hemorrhage, alcohol withdrawal, electrolyte abnormality      Critical Care Time Statement: Upon my evaluation, this patient had a high probability of imminent or life-threatening deterioration due to hypotension secondary to the dehydration, which required my direct attention, intervention, and personal management.  I spent a total of 40 minutes directly providing critical care services, including interpretation of complex medical databases, evaluating for the presence of life-threatening injuries or illnesses, management of organ system failure(s) , complex medical decision making (to support/prevent further life-threatening deterioration)., and interpretation of hemodynamic data. This time is exclusive of procedures, teaching, treating other patients, family meetings, and any prior time recorded by providers other than myself.       Amount and/or Complexity of Data Reviewed  Labs: ordered.  Radiology: ordered and independent interpretation performed.  ECG/medicine tests: ordered and independent interpretation performed.    Risk  Prescription drug management.             Medications   albuterol (PROVENTIL HFA,VENTOLIN HFA) inhaler 2 puff (has  no administration in time range)   aluminum-magnesium hydroxide-simethicone (MAALOX) oral suspension 30 mL (has no administration in time range)   apixaban (ELIQUIS) tablet 5 mg (has no administration in time range)   aspirin chewable tablet 81 mg (has no administration in time range)   fluticasone-vilanterol 200-25 mcg/actuation 1 puff (has no administration in time range)   ferrous sulfate tablet 325 mg (has no administration in time range)   folic acid (FOLVITE) tablet 1,000 mcg (has no administration in time range)   tamsulosin (FLOMAX) capsule 0.4 mg (has no administration in time range)   senna-docusate sodium (SENOKOT S) 8.6-50 mg per tablet 1 tablet (has no administration in time range)   ranolazine (RANEXA) 12 hr tablet 500 mg (has no administration in time range)   pantoprazole (PROTONIX) EC tablet 40 mg (40 mg Oral Given 4/5/25 2327)   nicotine (NICODERM CQ) 21 mg/24 hr TD 24 hr patch 1 patch (has no administration in time range)   magnesium Oxide (MAG-OX) tablet 800 mg (has no administration in time range)   lidocaine (LIDODERM) 5 % patch 1 patch (has no administration in time range)   lactated ringers infusion (75 mL/hr Intravenous New Bag 4/5/25 2328)   sodium chloride 0.9 % bolus 1,000 mL (0 mL Intravenous Stopped 4/5/25 1959)   diazepam (VALIUM) injection 5 mg (5 mg Intravenous Given 4/5/25 1943)   magnesium sulfate 2 g/50 mL IVPB (premix) 2 g (0 g Intravenous Stopped 4/5/25 2059)   sodium chloride 0.9 % bolus 1,000 mL (0 mL Intravenous Stopped 4/5/25 2033)   sodium chloride 0.9 % bolus 1,000 mL (0 mL Intravenous Stopped 4/5/25 2115)       ED Risk Strat Scores                 [facility] Score       Row Name 04/06/25 1326 04/06/25 09:31:52 04/06/25 8405       [facility]    Blood Pressure -- -- 110/73    Pulse -- -- 109    Nausea and Vomiting 2 0 2    Tactile Disturbances 0 0 0    Tremor 4 4 2    Auditory Disturbances 0 1 0    Paroxysmal Sweats 1 0 0    Visual Disturbances 0 0 0    Anxiety 3 0 1    Headache,  Fullness in Head 3 0 0    Agitation 0 1 1    Orientation and Clouding of Sensorium 1 0 0    [facility] Total 14 6 6      Row Name 04/06/25 01:29:05 04/06/25 0000 04/05/25 2315       [facility]    Pulse -- 89 --    Nausea and Vomiting 0 0 0    Tactile Disturbances 0 0 0    Tremor 2 4 3    Auditory Disturbances 0 0 0    Paroxysmal Sweats 0 1 0    Visual Disturbances 0 0 0    Anxiety 1 3 3    Headache, Fullness in Head 0 0 0    Agitation 0 2 0    Orientation and Clouding of Sensorium 0 0 0    [facility] Total 3 10 6      Row Name 04/05/25 2215 04/05/25 2115 04/05/25 2015       [facility]    Blood Pressure -- 107/55 --    Pulse -- 95 --    Nausea and Vomiting 0 0 0    Tactile Disturbances 0 0 0    Tremor 3 3 2    Auditory Disturbances 0 0 0    Paroxysmal Sweats 1 1 2    Visual Disturbances 0 0 0    Anxiety 3 3 1    Headache, Fullness in Head 0 0 0    Agitation 0 0 2    Orientation and Clouding of Sensorium 0 0 0    [facility] Total 7 7 7      Row Name 04/05/25 1915             [facility]    Nausea and Vomiting 0      Tactile Disturbances 0      Tremor 2      Auditory Disturbances 0      Paroxysmal Sweats 2      Visual Disturbances 0      Anxiety 1      Headache, Fullness in Head 0      Agitation 2      Orientation and Clouding of Sensorium 0      [facility] Total 7                              SBIRT 20yo+      Flowsheet Row Most Recent Value   Initial Alcohol Screen: US AUDIT-C     1. How often do you have a drink containing alcohol? 0 Filed at: 04/05/2025 1904   2. How many drinks containing alcohol do you have on a typical day you are drinking?  0 Filed at: 04/05/2025 1904   3a. Male UNDER 65: How often do you have five or more drinks on one occasion? 0 Filed at: 04/05/2025 1904   3b. FEMALE Any Age, or MALE 65+: How often do you have 4 or more drinks on one occassion? 0 Filed at: 04/05/2025 1904   Audit-C Score 0 Filed at: 04/05/2025 1904   BRIGIDA: How many times in the past year have you...    Used an illegal drug or used a prescription  medication for non-medical reasons? Never Filed at: 04/05/2025 1904                            History of Present Illness       Chief Complaint   Patient presents with    Fall     Pt. Arrives via [facility] for a fall that happened while on the toilet. Patient reports he stopped drinking 2 days ago has tremors and is diaphoretic.        Past Medical History:   Diagnosis Date    Acute on chronic kidney failure  (HCC)     Alcohol abuse     Alcohol withdrawal ([facility]) 06/07/2019    Atrial fibrillation (HCC)     Cancer (HCC)     prostate ca,had radiation    Cardiac disease     stents,then triple bypass    COPD (chronic obstructive pulmonary disease) (HCC)     Coronary artery disease     ETOH abuse     Heart failure ([facility])     History of heart surgery     says [facility] bypass [facility]    Hx of heart artery stent     2014    Hyperlipidemia     Hypertension     Hyponatremia 10/17/2019    Hypovolemic shock ([facility]) 12/22/2019    Lumbar spondylitis ([facility]) 10/13/2022    Metabolic acidosis, increased anion gap 04/21/2021    Nasal bone fracture 10/10/2022    Prostate CA ([facility])     S/P CABG x 3     2004    Sleep apnea       Past Surgical History:   Procedure Laterality Date    CARDIAC CATHETERIZATION      2 stents    CORONARY ARTERY BYPASS GRAFT      CORONARY ARTERY BYPASS GRAFT  2004    [place] ARTHRD ANT INTERBODY MIN DSC CRV BELOW C2 N/A 12/16/2020    Procedure: Anterior cervical discectomy with fusion C4-C7; Posterior cervical decompression and fusion C2-T2;  Surgeon: David Rowell MD;  Location: [facility];  Service: Neurosurgery    TONSILLECTOMY        Family History   Problem Relation Age of Onset    Diabetes Mother     Uterine cancer Mother     COPD Father     Hypertension Father       Social History     Tobacco Use    Smoking status: Every Day     Current packs/day: 1.50     Average packs/day: 1.5 packs/day for 40.0 years (60.0 ttl pk-yrs)     Types: Cigarettes    Smokeless tobacco: Never   Vaping Use    Vaping status: Never Used    Substance Use Topics    Alcohol use: Yes     Alcohol/week: 4.0 standard drinks of alcohol     Types: 4 Standard drinks or equivalent per week     Comment: quart of vodka daily    Drug use: No      E-Cigarette/Vaping    E-Cigarette Use Never User       E-Cigarette/Vaping Substances    Nicotine Yes     THC No     CBD No     Flavoring No     Other No     Unknown No       I have reviewed and agree with the history as documented.     Patient brought in by EMS for evaluation after a fall.  Patient fell while on the toilet.  Questionable whether or not he has seizure.  Strong odor of urine on arrival.  Patient has a history of alcohol abuse and stopped drinking 2 days ago.      History provided by:  Patient and EMS personnel   used: No    Fall      Review of Systems   Constitutional:  Positive for diaphoresis.   Neurological:  Positive for tremors.   All other systems reviewed and are negative.          Objective       ED Triage Vitals   Temperature Pulse Blood Pressure Respirations SpO2 Patient Position - Orthostatic VS   04/05/25 1909 04/05/25 1909 04/05/25 1909 04/05/25 1909 04/05/25 1909 04/05/25 1909   98.2 °F (36.8 °C) 96 (!) 69/49 18 94 % Lying      Temp Source Heart Rate Source BP Location FiO2 (%) Pain Score    04/05/25 1909 04/05/25 1909 04/05/25 1909 -- 04/06/25 0038    Oral Monitor Left arm  No Pain      Vitals      Date and Time Temp Pulse SpO2 Resp BP Pain Score FACES Pain Rating User   04/06/25 1528 -- 94 95 % 20 114/70 -- -- DII   04/06/25 1329 -- 113 94 % -- 105/68 -- -- DII   04/06/25 0931 98.1 °F (36.7 °C) 110 97 % 16 104/66 -- -- DII   04/06/25 0800 -- -- -- -- -- No Pain -- CN   04/06/25 0538 -- 109 -- -- 110/73 -- -- DW   04/06/25 0307 -- 98 94 % -- 108/67 -- -- DII   04/06/25 0129 -- 94 96 % -- 106/67 -- -- DII   04/06/25 0038 -- -- -- -- -- No Pain -- DW   04/06/25 0000 -- 89 -- -- -- -- --    04/05/25 2352 98.2 °F (36.8 °C) -- -- -- -- -- --    04/05/25 2352 -- -- -- 21  102/68 -- -- DII   04/05/25 2330 -- 101 96 % 22 104/55 -- --    04/05/25 2315 -- 96 96 % 20 103/55 -- --    04/05/25 2300 -- 106 95 % 20 107/56 -- --    04/05/25 2230 -- 93 95 % 20 110/65 -- --    04/05/25 2222 -- -- 90 % -- -- -- --    04/05/25 2215 -- 96 98 % 20 95/50 -- --    04/05/25 2200 -- 95 98 % 20 114/56 -- --    04/05/25 2115 -- 95 -- -- 107/55 -- --    04/05/25 2100 -- 95 100 % 20 107/55 -- --    04/05/25 2045 -- 89 100 % 20 99/50 -- --    04/05/25 2030 -- 93 100 % -- 99/56 -- --    04/05/25 2024 -- 95 99 % -- 92/48 -- --    04/05/25 2015 -- 97 100 % -- 88/53 md aware, 3rd bag verbal order -- --    04/05/25 2000 -- 110 98 % 25 99/53 -- --    04/05/25 1930 -- 112 96 % 26 114/51 -- --    04/05/25 1915 -- 118 95 % 23 81/49 -- --    04/05/25 1909 98.2 °F (36.8 °C) 96 94 % 18 69/49 -- -- DD            Physical Exam  Vitals and nursing note reviewed.   Constitutional:       General: He is in acute distress.      Appearance: He is ill-appearing.   HENT:      Mouth/Throat:      Mouth: Mucous membranes are dry.      Pharynx: Oropharynx is clear.   Eyes:      Extraocular Movements: Extraocular movements intact.      Conjunctiva/sclera: Conjunctivae normal.      Pupils: Pupils are equal, round, and reactive to light.   Cardiovascular:      Rate and Rhythm: Tachycardia present. Rhythm irregular.   Pulmonary:      Effort: Pulmonary effort is normal. No respiratory distress.      Breath sounds: Normal breath sounds.   Abdominal:      Tenderness: There is no abdominal tenderness.   Neurological:      General: No focal deficit present.      Mental Status: He is alert and oriented to person, place, and time.         Results Reviewed       Procedure Component Value Units Date/Time    CBC and differential [[id number]]  (Abnormal) Collected: 04/06/25 0501    Lab Status: Final result Specimen: Blood from Arm, Left Updated: 04/06/25 0630     WBC 5.30 Thousand/uL      RBC 3.10 Million/uL       Hemoglobin 9.5 g/dL      Hematocrit 28.3 %      MCV 91 fL      MCH 30.6 pg      MCHC 33.6 g/dL      RDW 19.1 %      MPV 10.8 fL      Platelets 40 Thousands/uL      nRBC 0 /100 WBCs      Segmented % 61 %      Immature Grans % 0 %      Lymphocytes % 19 %      Monocytes % 20 %      Eosinophils Relative 0 %      Basophils Relative 0 %      Absolute Neutrophils 3.16 Thousands/µL      Absolute Immature Grans 0.02 Thousand/uL      Absolute Lymphocytes 1.03 Thousands/µL      Absolute Monocytes 1.06 Thousand/µL      Eosinophils Absolute 0.01 Thousand/µL      Basophils Absolute 0.02 Thousands/µL     Basic metabolic panel [[id number]]  (Abnormal) Collected: 04/06/25 0501    Lab Status: Final result Specimen: Blood from Arm, Left Updated: 04/06/25 0615     Sodium 131 mmol/L      Potassium 4.1 mmol/L      Chloride 95 mmol/L      CO2 18 mmol/L      ANION GAP 18 mmol/L      BUN 42 mg/dL      Creatinine 1.64 mg/dL      Glucose 102 mg/dL      Calcium 7.3 mg/dL      eGFR 44 ml/min/1.73sq m     Narrative:      National Kid[employer] Foundation guidelines for Chronic Kidney Disease (CKD):     Stage 1 with normal or high GFR (GFR > 90 mL/min/1.73 square meters)    Stage 2 Mild CKD (GFR = 60-89 mL/min/1.73 square meters)    Stage 3A Moderate CKD (GFR = 45-59 mL/min/1.73 square meters)    Stage 3B Moderate CKD (GFR = 30-44 mL/min/1.73 square meters)    Stage 4 Severe CKD (GFR = 15-29 mL/min/1.73 square meters)    Stage 5 End Stage CKD (GFR <15 mL/min/1.73 square meters)  Note: GFR calculation is accurate only with a steady state creatinine    Magnesium [[id number]]  (Abnormal) Collected: 04/06/25 0501    Lab Status: Final result Specimen: Blood from Arm, Left Updated: 04/06/25 0615     Magnesium 1.4 mg/dL     Rapid drug screen, urine [[id number]]  (Normal) Collected: 04/06/25 0250    Lab Status: Final result Specimen: Urine, Clean Catch Updated: 04/06/25 0428     Amph/Meth UR Negative     Barbiturate Ur Negative     Benzodiazepine Urine  Negative     Cocaine Urine Negative     Methadone Urine Negative     Opiate Urine Negative     PCP Ur Negative     THC Urine Negative     Oxycodone Urine Negative     Fentanyl Urine Negative     HYDROCODONE URINE Negative    Narrative:      FOR MEDICAL PURPOSES ONLY.   IF CONFIRMATION NEEDED PLEASE CONTACT THE LAB WITHIN 5 DAYS.    Drug Screen Cutoff Levels:  AMPHETAMINE/METHAMPHETAMINES  1000 ng/mL  BARBITURATES     200 ng/mL  BENZODIAZEPINES     200 ng/mL  COCAINE      300 ng/mL  METHADONE      300 ng/mL  OPIATES      300 ng/mL  PHENCYCLIDINE     25 ng/mL  THC       50 ng/mL  OXYCODONE      100 ng/mL  FENTANYL      5 ng/mL  HYDROCODONE     300 ng/mL    UA (URINE) with reflex to Scope [[id number]]  (Abnormal) Collected: 04/06/25 0250    Lab Status: Final result Specimen: Urine, Clean Catch Updated: 04/06/25 0303     Color, UA Yellow     Clarity, UA Clear     Specific Gravity, UA 1.025     pH, UA 6.0     Leukocytes, UA Negative     Nitrite, UA Negative     Protein,  (2+) mg/dl      Glucose, UA Negative mg/dl      Ketones, UA 40 (2+) mg/dl      Urobilinogen, UA 2.0 mg/dl      Bilirubin, UA Small     Occult Blood, UA Moderate    HS Troponin I 2hr [[id number]]  (Normal) Collected: 04/05/25 2141    Lab Status: Final result Specimen: Blood from Arm, Left Updated: 04/05/25 2209     hs TnI 2hr 44 ng/L      Delta 2hr hsTnI -10 ng/L     Lactic acid 2 Hours [[id number]]  (Normal) Collected: 04/05/25 2141    Lab Status: Final result Specimen: Blood from Arm, Left Updated: 04/05/25 2204     LACTIC ACID 1.1 mmol/L     Narrative:      Result may be elevated if tourniquet was used during collection.    Lactic acid, plasma (w/reflex if result > 2.0) [[id number]]  (Abnormal) Collected: 04/05/25 1914    Lab Status: Final result Specimen: Blood from Arm, Left Updated: 04/05/25 2003     LACTIC ACID 4.3 mmol/L     Narrative:      Result may be elevated if tourniquet was used during collection.    Magnesium [[id number]]  (Abnormal)  Collected: 04/05/25 1911    Lab Status: Final result Specimen: Blood from Arm, Left Updated: 04/05/25 1948     Magnesium 1.0 mg/dL     Comprehensive metabolic panel [[id number]]  (Abnormal) Collected: 04/05/25 1911    Lab Status: Final result Specimen: Blood from Arm, Left Updated: 04/05/25 1947     Sodium 131 mmol/L      Potassium 4.8 mmol/L      Chloride 87 mmol/L      CO2 12 mmol/L      ANION GAP 32 mmol/L      BUN 41 mg/dL      Creatinine 1.87 mg/dL      Glucose 70 mg/dL      Calcium 8.6 mg/dL      AST 75 U/L      ALT 41 U/L      Alkaline Phosphatase 130 U/L      Total Protein 7.3 g/dL      Albumin 3.9 g/dL      Total Bilirubin 1.76 mg/dL      eGFR 37 ml/min/1.73sq m     Narrative:      National Kidney Disease Foundation guidelines for Chronic Kidney Disease (CKD):     Stage 1 with normal or high GFR (GFR > 90 mL/min/1.73 square meters)    Stage 2 Mild CKD (GFR = 60-89 mL/min/1.73 square meters)    Stage 3A Moderate CKD (GFR = 45-59 mL/min/1.73 square meters)    Stage 3B Moderate CKD (GFR = 30-44 mL/min/1.73 square meters)    Stage 4 Severe CKD (GFR = 15-29 mL/min/1.73 square meters)    Stage 5 End Stage CKD (GFR <15 mL/min/1.73 square meters)  Note: GFR calculation is accurate only with a steady state creatinine    Lipase [[id number]]  (Normal) Collected: 04/05/25 1911    Lab Status: Final result Specimen: Blood from Arm, Left Updated: 04/05/25 1947     Lipase 74 u/L     CBC and differential [[id number]]  (Abnormal) Collected: 04/05/25 1911    Lab Status: Final result Specimen: Blood from Arm, Left Updated: 04/05/25 1942     WBC 5.76 Thousand/uL      RBC 3.82 Million/uL      Hemoglobin 11.5 g/dL      Hematocrit 35.0 %      MCV 92 fL      MCH 30.1 pg      MCHC 32.9 g/dL      RDW 19.2 %      MPV 11.2 fL      Platelets 55 Thousands/uL      nRBC 1 /100 WBCs      Segmented % 70 %      Immature Grans % 1 %      Lymphocytes % 12 %      Monocytes % 16 %      Eosinophils Relative 0 %      Basophils Relative 1 %       Absolute Neutrophils 4.11 Thousands/µL      Absolute Immature Grans 0.03 Thousand/uL      Absolute Lymphocytes 0.66 Thousands/µL      Absolute Monocytes 0.92 Thousand/µL      Eosinophils Absolute 0.01 Thousand/µL      Basophils Absolute 0.03 Thousands/µL     Narrative:      This is an appended report.  These results have been appended to a previously verified report.    Smear Review(Phlebs Do Not Order) [[id number]]  (Abnormal) Collected: 04/05/25 1911    Lab Status: Final result Specimen: Blood from Arm, Left Updated: 04/05/25 1942     RBC Morphology Present     Platelet Estimate Decreased     Anisocytosis Present    HS Troponin 0hr (reflex protocol) [[id number]]  (Abnormal) Collected: 04/05/25 1911    Lab Status: Final result Specimen: Blood from Arm, Left Updated: 04/05/25 1939     hs TnI 0hr 54 ng/L     Ethanol [[id number]]  (Normal) Collected: 04/05/25 1911    Lab Status: Final result Specimen: Blood from Arm, Left Updated: 04/05/25 1933     Ethanol Lvl <10 mg/dL             XR chest 1 view portable   Final Interpretation by Heidy Welch MD (04/06 0752)      No acute cardiopulmonary disease.            Workstation performed: [id number]         CT cervical spine without contrast   Final Interpretation by Rikki Hines MD (04/05 2043)      No cervical spine fracture or traumatic malalignment.                  Workstation performed: [id number]         CT head without contrast   Final Interpretation by Rikki Hines MD (04/05 2042)      No acute intracranial abnormality.                  Workstation performed: [id number]             ECG 12 Lead Documentation Only    Date/Time: 4/5/2025 7:15 PM    Performed by: Quinn Krishna DO  Authorized by: Quinn Krishna DO    ECG reviewed by me, the ED Provider: yes    Patient location:  ED  Interpretation:     Interpretation: abnormal    Rate:     ECG rate:  115    ECG rate assessment: tachycardic    Rhythm:     Rhythm: atrial fibrillation    Ectopy:     Ectopy:  none    QRS:     QRS axis:  Right  ST segments:     ST segments:  Non-specific      ED Medication and Procedure Management   Prior to Admission Medications   Prescriptions Last Dose Informant Patient Reported? Taking?   Blood Glucose Monitoring Suppl (ONE TOUCH ULTRA MINI) w/Device KIT 4/5/2025 Self Yes Yes   Sig: Use as directed   Blood Pressure Monitoring (Adult Blood Pressure Cuff Lg) KIT 4/5/2025  No Yes   Sig: Use in the morning   Breo Ellipta 200-25 MCG/ACT inhaler 4/5/2025 Self No Yes   Sig: Inhale 1 puff daily Rinse mouth after use.   ONETOUCH DELICA LANCETS FINE MISC 4/5/2025 Self Yes Yes   Sig: 3 (three) times a day Test   Thiamine HCl (vitamin B-1) 100 MG TABS 4/5/2025 Self No Yes   Sig: TAKE 1 TABLET DAILY   albuterol (Ventolin HFA) 90 mcg/act inhaler 4/5/2025 Self No Yes   Sig: Inhale 2 puffs every 4 (four) hours as needed for wheezing   aluminum-magnesium hydroxide-simethicone (MAALOX) 2629-5699-962 mg/30 mL suspension 4/5/2025  No Yes   Sig: Take 30 mL by mouth every 4 (four) hours as needed for indigestion or heartburn   apixaban (Eliquis) 5 mg 4/5/2025  No Yes   Sig: TAKE 1 TABLET BY MOUTH 2 TIMES A DAY DO NOT START BEFORE JANUARY 31, 2024.   aspirin (ECOTRIN LOW STRENGTH) 81 mg EC tablet 4/5/2025 Self Yes Yes   Sig: Take 81 mg by mouth daily   diltiazem (CARDIZEM SR) 120 mg 12 hr capsule 4/5/2025  No Yes   Sig: Take 1 capsule (120 mg total) by mouth every 12 (twelve) hours   ferrous sulfate 325 (65 Fe) mg tablet  Self No No   Sig: Take 1 tablet (325 mg total) by mouth daily with breakfast   folic acid (FOLVITE) 1 mg tablet 4/5/2025  No Yes   Sig: TAKE 1 TABLET DAILY   gabapentin (NEURONTIN) 300 mg capsule 4/5/2025  No Yes   Sig: TAKE ONE CAPSULE THREE TIMES DAILY   lidocaine (Lidoderm) 5 % 4/5/2025  No Yes   Sig: Apply 1 patch topically over 12 hours daily Remove & Discard patch within 12 hours or as directed by MD   magnesium Oxide (MAG-OX) 400 mg TABS 4/5/2025  No Yes   Sig: Take 2 tablets (800  mg total) by mouth 2 (two) times a day   metFORMIN (GLUCOPHAGE) 500 mg tablet 4/5/2025  No Yes   Sig: TAKE 1 TABLET DAILY WITH BREAKFAST   metoprolol tartrate (LOPRESSOR) 100 mg tablet 4/5/2025  No Yes   Sig: Take 1.5 tablets (150 mg total) by mouth every 12 (twelve) hours   nicotine (NICODERM CQ) 21 mg/24 hr TD 24 hr patch 4/5/2025 Self No Yes   Sig: Place 1 patch on the skin over 24 hours daily Do not start before December 4, 2023.   pantoprazole (PROTONIX) 40 mg tablet 4/5/2025  No Yes   Sig: TAKE 1 TABLET TWO TIMES A DAY   ranolazine (RANEXA) 500 mg 12 hr tablet 4/5/2025  No Yes   Sig: TAKE 1 TABLET EVERY 12 HOURS   senna-docusate sodium (SENOKOT S) 8.6-50 mg per tablet 4/5/2025  No Yes   Sig: Take 1 tablet by mouth daily as needed for constipation   tamsulosin (FLOMAX) 0.4 mg 4/5/2025  No Yes   Sig: TAKE 1 CAPSULE DAILY      Facility-Administered Medications: None     Current Discharge Medication List        CONTINUE these medications which have NOT CHANGED    Details   albuterol (Ventolin HFA) 90 mcg/act inhaler Inhale 2 puffs every 4 (four) hours as needed for wheezing  Qty: 18 g, Refills: 0    Comments: Substitution to a formulary equivalent within the same pharmaceutical class is authorized.  Associated Diagnoses: COPD (chronic obstructive pulmonary disease) ([facility])      aluminum-magnesium hydroxide-simethicone (MAALOX) 0941-6062-143 mg/30 mL suspension Take 30 mL by mouth every 4 (four) hours as needed for indigestion or heartburn  Qty: 769 mL, Refills: 1    Associated Diagnoses: Gastroesophageal reflux disease, unspecified whether esophagitis present      apixaban (Eliquis) 5 mg TAKE 1 TABLET BY MOUTH 2 TIMES A DAY DO NOT START BEFORE JANUARY 31, 2024.  Qty: 180 tablet, Refills: 2    Comments: PATIENT IS REQUESTING REFILLS. THANKS!  Associated Diagnoses: Paroxysmal atrial fibrillation (HCC)      aspirin (ECOTRIN LOW STRENGTH) 81 mg EC tablet Take 81 mg by mouth daily      Blood Glucose Monitoring Suppl (ONE  TOUCH ULTRA MINI) w/Device KIT Use as directed  Refills: 0      Blood Pressure Monitoring (Adult Blood Pressure Cuff Lg) KIT Use in the morning  Qty: 1 kit, Refills: 0    Associated Diagnoses: Hypertension      Breo Ellipta 200-25 MCG/ACT inhaler Inhale 1 puff daily Rinse mouth after use.  Qty: 60 each, Refills: 3    Associated Diagnoses: COPD (chronic obstructive pulmonary disease) ([facility])      diltiazem (CARDIZEM SR) 120 mg 12 hr capsule Take 1 capsule (120 mg total) by mouth every 12 (twelve) hours  Qty: 60 capsule, Refills: 0    Associated Diagnoses: Chronic atrial fibrillation ([facility])      folic acid (FOLVITE) 1 mg tablet TAKE 1 TABLET DAILY  Qty: 90 tablet, Refills: 2    Associated Diagnoses: Alcohol abuse      gabapentin (NEURONTIN) 300 mg capsule TAKE ONE CAPSULE THREE TIMES DAILY  Qty: 90 capsule, Refills: 1    Associated Diagnoses: Edema, spinal cord ([facility])      lidocaine (Lidoderm) 5 % Apply 1 patch topically over 12 hours daily Remove & Discard patch within 12 hours or as directed by MD  Qty: 30 patch, Refills: 0    Associated Diagnoses: Falls      magnesium Oxide (MAG-OX) 400 mg TABS Take 2 tablets (800 mg total) by mouth 2 (two) times a day  Qty: 120 tablet, Refills: 1    Associated Diagnoses: Hypomagnesemia      metFORMIN (GLUCOPHAGE) 500 mg tablet TAKE 1 TABLET DAILY WITH BREAKFAST  Qty: 90 tablet, Refills: 1    Associated Diagnoses: Type 2 diabetes mellitus with hyperosmolarity without coma, without long-term current use of insulin ([facility])      metoprolol tartrate (LOPRESSOR) 100 mg tablet Take 1.5 tablets (150 mg total) by mouth every 12 (twelve) hours    Associated Diagnoses: Atrial fibrillation with rapid ventricular response ([facility])      nicotine (NICODERM CQ) 21 mg/24 hr TD 24 hr patch Place 1 patch on the skin over 24 hours daily Do not start before December 4, 2023.  Qty: 28 patch, Refills: 0    Associated Diagnoses: Nicotine abuse      ONETOUCH DELICA LANCETS FINE MISC 3 (three) times a day  Test  Refills: 0      pantoprazole (PROTONIX) 40 mg tablet TAKE 1 TABLET TWO TIMES A DAY  Qty: 60 tablet, Refills: 2    Associated Diagnoses: Gastroesophageal reflux disease, unspecified whether esophagitis present      ranolazine (RANEXA) 500 mg 12 hr tablet TAKE 1 TABLET EVERY 12 HOURS  Qty: 60 tablet, Refills: 3    Associated Diagnoses: Essential (primary) hypertension      senna-docusate sodium (SENOKOT S) 8.6-50 mg per tablet Take 1 tablet by mouth daily as needed for constipation    Associated Diagnoses: Constipation      tamsulosin (FLOMAX) 0.4 mg TAKE 1 CAPSULE DAILY  Qty: 90 capsule, Refills: 1    Associated Diagnoses: Hx of prostatic malignancy      Thiamine HCl (vitamin B-1) 100 MG TABS TAKE 1 TABLET DAILY  Qty: 90 tablet, Refills: 0    Associated Diagnoses: Alcohol abuse      ferrous sulfate 325 (65 Fe) mg tablet Take 1 tablet (325 mg total) by mouth daily with breakfast  Qty: 60 tablet, Refills: 5    Associated Diagnoses: Alcohol abuse           No discharge procedures on file.  ED SEPSIS DOCUMENTATION   Time reflects when diagnosis was documented in both MDM as applicable and the Disposition within this note       Time User Action Codes Description Comment    4/5/2025  8:58 PM Quinn Krishna [W19.XXXA] Fall, initial encounter     4/5/2025  8:58 PM Quinn Krishna [F10.939] Alcohol withdrawal (HCC)     4/5/2025  9:07 PM Quinn Krishna [E83.42] Hypomagnesemia     4/5/2025  9:07 PM Quinn Krishna [N17.9] ENMA (acute kidney injury) (HCC)     4/5/2025  9:07 PM Quinn Krishna [I95.9] Hypotension            Initial Sepsis Screening       Row Name 04/05/25 5062                Is the patient's history suggestive of a new or worsening infection? Yes (Proceed)  -BN        Suspected source of infection suspect infection, source unknown  -BN        Indicate SIRS criteria Tachycardia > 90 bpm;Tachypnea > 20 resp per min  -BN        Are two or more of the above signs & symptoms of infection both  present and new to the patient? Yes (Proceed)  -BN        Assess for evidence of organ dysfunction: Are any of the below criteria present within 6 hours of suspected infection and SIRS criteria that are NOT considered to be chronic conditions? --                  User Key  (r) = Recorded By, (t) = Taken By, (c) = Cosigned By      Initials Name Provider Type    DEEDEE Hinojosa MD Resident                       Quinn Krishna,   04/06/25 4060

## 2025-04-06 LAB
AMPHETAMINES SERPL QL SCN: NEGATIVE
ANION GAP SERPL CALCULATED.3IONS-SCNC: 18 MMOL/L (ref 4–13)
ANION GAP SERPL CALCULATED.3IONS-SCNC: 23 MMOL/L (ref 4–13)
BACTERIA UR QL AUTO: ABNORMAL /HPF
BARBITURATES UR QL: NEGATIVE
BASOPHILS # BLD AUTO: 0.02 THOUSANDS/ÂΜL (ref 0–0.1)
BASOPHILS NFR BLD AUTO: 0 % (ref 0–1)
BENZODIAZ UR QL: NEGATIVE
BILIRUB UR QL STRIP: ABNORMAL
BUN SERPL-MCNC: 39 MG/DL (ref 5–25)
BUN SERPL-MCNC: 42 MG/DL (ref 5–25)
CALCIUM SERPL-MCNC: 7.3 MG/DL (ref 8.4–10.2)
CALCIUM SERPL-MCNC: 7.5 MG/DL (ref 8.4–10.2)
CHLORIDE SERPL-SCNC: 94 MMOL/L (ref 96–108)
CHLORIDE SERPL-SCNC: 95 MMOL/L (ref 96–108)
CK SERPL-CCNC: 785 U/L (ref 39–308)
CLARITY UR: CLEAR
CO2 SERPL-SCNC: 14 MMOL/L (ref 21–32)
CO2 SERPL-SCNC: 18 MMOL/L (ref 21–32)
COCAINE UR QL: NEGATIVE
COLOR UR: YELLOW
CREAT SERPL-MCNC: 1.59 MG/DL (ref 0.6–1.3)
CREAT SERPL-MCNC: 1.64 MG/DL (ref 0.6–1.3)
EOSINOPHIL # BLD AUTO: 0.01 THOUSAND/ÂΜL (ref 0–0.61)
EOSINOPHIL NFR BLD AUTO: 0 % (ref 0–6)
ERYTHROCYTE [DISTWIDTH] IN BLOOD BY AUTOMATED COUNT: 19.1 % (ref 11.6–15.1)
FENTANYL UR QL SCN: NEGATIVE
GFR SERPL CREATININE-BSD FRML MDRD: 44 ML/MIN/1.73SQ M
GFR SERPL CREATININE-BSD FRML MDRD: 45 ML/MIN/1.73SQ M
GLUCOSE SERPL-MCNC: 102 MG/DL (ref 65–140)
GLUCOSE SERPL-MCNC: 54 MG/DL (ref 65–140)
GLUCOSE UR STRIP-MCNC: NEGATIVE MG/DL
HCT VFR BLD AUTO: 28.3 % (ref 36.5–49.3)
HGB BLD-MCNC: 9.5 G/DL (ref 12–17)
HGB UR QL STRIP.AUTO: ABNORMAL
HYALINE CASTS #/AREA URNS LPF: ABNORMAL /LPF
HYDROCODONE UR QL SCN: NEGATIVE
IMM GRANULOCYTES # BLD AUTO: 0.02 THOUSAND/UL (ref 0–0.2)
IMM GRANULOCYTES NFR BLD AUTO: 0 % (ref 0–2)
KETONES UR STRIP-MCNC: ABNORMAL MG/DL
LEUKOCYTE ESTERASE UR QL STRIP: NEGATIVE
LYMPHOCYTES # BLD AUTO: 1.03 THOUSANDS/ÂΜL (ref 0.6–4.47)
LYMPHOCYTES NFR BLD AUTO: 19 % (ref 14–44)
MAGNESIUM SERPL-MCNC: 1.4 MG/DL (ref 1.9–2.7)
MCH RBC QN AUTO: 30.6 PG (ref 26.8–34.3)
MCHC RBC AUTO-ENTMCNC: 33.6 G/DL (ref 31.4–37.4)
MCV RBC AUTO: 91 FL (ref 82–98)
METHADONE UR QL: NEGATIVE
MONOCYTES # BLD AUTO: 1.06 THOUSAND/ÂΜL (ref 0.17–1.22)
MONOCYTES NFR BLD AUTO: 20 % (ref 4–12)
MUCOUS THREADS UR QL AUTO: ABNORMAL
NEUTROPHILS # BLD AUTO: 3.16 THOUSANDS/ÂΜL (ref 1.85–7.62)
NEUTS SEG NFR BLD AUTO: 61 % (ref 43–75)
NITRITE UR QL STRIP: NEGATIVE
NON-SQ EPI CELLS URNS QL MICRO: ABNORMAL /HPF
NRBC BLD AUTO-RTO: 0 /100 WBCS
OPIATES UR QL SCN: NEGATIVE
OXYCODONE+OXYMORPHONE UR QL SCN: NEGATIVE
PCP UR QL: NEGATIVE
PH UR STRIP.AUTO: 6 [PH]
PLATELET # BLD AUTO: 40 THOUSANDS/UL (ref 149–390)
PMV BLD AUTO: 10.8 FL (ref 8.9–12.7)
POTASSIUM SERPL-SCNC: 4.1 MMOL/L (ref 3.5–5.3)
POTASSIUM SERPL-SCNC: 4.6 MMOL/L (ref 3.5–5.3)
PROT UR STRIP-MCNC: ABNORMAL MG/DL
RBC # BLD AUTO: 3.1 MILLION/UL (ref 3.88–5.62)
RBC #/AREA URNS AUTO: ABNORMAL /HPF
SODIUM SERPL-SCNC: 131 MMOL/L (ref 135–147)
SODIUM SERPL-SCNC: 131 MMOL/L (ref 135–147)
SP GR UR STRIP.AUTO: 1.02 (ref 1–1.03)
THC UR QL: NEGATIVE
UROBILINOGEN UR STRIP-ACNC: 2 MG/DL
WBC # BLD AUTO: 5.3 THOUSAND/UL (ref 4.31–10.16)
WBC #/AREA URNS AUTO: ABNORMAL /HPF

## 2025-04-06 PROCEDURE — 83735 ASSAY OF MAGNESIUM: CPT

## 2025-04-06 PROCEDURE — 85025 COMPLETE CBC W/AUTO DIFF WBC: CPT

## 2025-04-06 PROCEDURE — 80048 BASIC METABOLIC PNL TOTAL CA: CPT

## 2025-04-06 PROCEDURE — 81001 URINALYSIS AUTO W/SCOPE: CPT | Performed by: EMERGENCY MEDICINE

## 2025-04-06 PROCEDURE — 99223 1ST HOSP IP/OBS HIGH 75: CPT | Performed by: FAMILY MEDICINE

## 2025-04-06 PROCEDURE — 80307 DRUG TEST PRSMV CHEM ANLYZR: CPT | Performed by: EMERGENCY MEDICINE

## 2025-04-06 PROCEDURE — 82550 ASSAY OF CK (CPK): CPT | Performed by: FAMILY MEDICINE

## 2025-04-06 RX ORDER — METOPROLOL TARTRATE 1 MG/ML
5 INJECTION, SOLUTION INTRAVENOUS ONCE
Status: COMPLETED | OUTPATIENT
Start: 2025-04-06 | End: 2025-04-06

## 2025-04-06 RX ORDER — DILTIAZEM HYDROCHLORIDE 60 MG/1
120 CAPSULE, EXTENDED RELEASE ORAL EVERY 12 HOURS SCHEDULED
Status: DISCONTINUED | OUTPATIENT
Start: 2025-04-06 | End: 2025-04-09 | Stop reason: HOSPADM

## 2025-04-06 RX ORDER — ALBUMIN (HUMAN) 12.5 G/50ML
25 SOLUTION INTRAVENOUS ONCE
Status: COMPLETED | OUTPATIENT
Start: 2025-04-06 | End: 2025-04-06

## 2025-04-06 RX ORDER — DIGOXIN 0.25 MG/ML
125 INJECTION INTRAMUSCULAR; INTRAVENOUS ONCE
Status: COMPLETED | OUTPATIENT
Start: 2025-04-06 | End: 2025-04-06

## 2025-04-06 RX ORDER — LANOLIN ALCOHOL/MO/W.PET/CERES
100 CREAM (GRAM) TOPICAL DAILY
Status: DISCONTINUED | OUTPATIENT
Start: 2025-04-07 | End: 2025-04-09 | Stop reason: HOSPADM

## 2025-04-06 RX ORDER — LORAZEPAM 1 MG/1
2 TABLET ORAL ONCE
Status: COMPLETED | OUTPATIENT
Start: 2025-04-06 | End: 2025-04-06

## 2025-04-06 RX ORDER — MAGNESIUM SULFATE HEPTAHYDRATE 40 MG/ML
4 INJECTION, SOLUTION INTRAVENOUS ONCE
Status: COMPLETED | OUTPATIENT
Start: 2025-04-06 | End: 2025-04-06

## 2025-04-06 RX ADMIN — DIGOXIN 125 MCG: 0.25 INJECTION INTRAMUSCULAR; INTRAVENOUS at 10:46

## 2025-04-06 RX ADMIN — DILTIAZEM HYDROCHLORIDE 120 MG: 60 CAPSULE, EXTENDED RELEASE ORAL at 18:11

## 2025-04-06 RX ADMIN — ASPIRIN 81 MG: 81 TABLET, CHEWABLE ORAL at 09:48

## 2025-04-06 RX ADMIN — MAGNESIUM SULFATE HEPTAHYDRATE 4 G: 40 INJECTION, SOLUTION INTRAVENOUS at 09:48

## 2025-04-06 RX ADMIN — LORAZEPAM 2 MG: 1 TABLET ORAL at 00:26

## 2025-04-06 RX ADMIN — PANTOPRAZOLE SODIUM 40 MG: 40 TABLET, DELAYED RELEASE ORAL at 09:48

## 2025-04-06 RX ADMIN — FOLIC ACID 1000 MCG: 1 TABLET ORAL at 09:48

## 2025-04-06 RX ADMIN — ALBUMIN (HUMAN) 25 G: 0.25 INJECTION, SOLUTION INTRAVENOUS at 10:46

## 2025-04-06 RX ADMIN — SODIUM CHLORIDE, SODIUM LACTATE, POTASSIUM CHLORIDE, AND CALCIUM CHLORIDE 75 ML/HR: .6; .31; .03; .02 INJECTION, SOLUTION INTRAVENOUS at 09:53

## 2025-04-06 RX ADMIN — TRIMETHOBENZAMIDE HYDROCHLORIDE 200 MG: 100 INJECTION INTRAMUSCULAR at 16:36

## 2025-04-06 RX ADMIN — TAMSULOSIN HYDROCHLORIDE 0.4 MG: 0.4 CAPSULE ORAL at 09:48

## 2025-04-06 RX ADMIN — LORAZEPAM 2 MG: 1 TABLET ORAL at 13:32

## 2025-04-06 RX ADMIN — FERROUS SULFATE TAB 325 MG (65 MG ELEMENTAL FE) 325 MG: 325 (65 FE) TAB at 09:48

## 2025-04-06 RX ADMIN — SODIUM CHLORIDE, SODIUM LACTATE, POTASSIUM CHLORIDE, AND CALCIUM CHLORIDE 75 ML/HR: .6; .31; .03; .02 INJECTION, SOLUTION INTRAVENOUS at 22:54

## 2025-04-06 RX ADMIN — METOPROLOL TARTRATE 150 MG: 100 TABLET, FILM COATED ORAL at 22:20

## 2025-04-06 RX ADMIN — RANOLAZINE 500 MG: 500 TABLET, FILM COATED, EXTENDED RELEASE ORAL at 09:48

## 2025-04-06 RX ADMIN — APIXABAN 5 MG: 5 TABLET, FILM COATED ORAL at 09:48

## 2025-04-06 RX ADMIN — RANOLAZINE 500 MG: 500 TABLET, FILM COATED, EXTENDED RELEASE ORAL at 22:17

## 2025-04-06 RX ADMIN — LORAZEPAM 2 MG: 1 TABLET ORAL at 22:50

## 2025-04-06 RX ADMIN — NICOTINE 1 PATCH: 21 PATCH, EXTENDED RELEASE TRANSDERMAL at 09:48

## 2025-04-06 RX ADMIN — FLUTICASONE FUROATE AND VILANTEROL TRIFENATATE 1 PUFF: 200; 25 POWDER RESPIRATORY (INHALATION) at 09:48

## 2025-04-06 RX ADMIN — Medication 800 MG: at 09:48

## 2025-04-06 RX ADMIN — PANTOPRAZOLE SODIUM 40 MG: 40 TABLET, DELAYED RELEASE ORAL at 22:17

## 2025-04-06 RX ADMIN — APIXABAN 5 MG: 5 TABLET, FILM COATED ORAL at 18:10

## 2025-04-06 RX ADMIN — LIDOCAINE 1 PATCH: 50 PATCH CUTANEOUS at 09:48

## 2025-04-06 RX ADMIN — METOROPROLOL TARTRATE 5 MG: 5 INJECTION, SOLUTION INTRAVENOUS at 20:25

## 2025-04-06 NOTE — PLAN OF CARE
Problem: PAIN - ADULT  Goal: Verbalizes/displays adequate comfort level or baseline comfort level  Description: Interventions:- Encourage patient to monitor pain and request assistance- Assess pain using appropriate pain scale- Administer analgesics based on type and severity of pain and evaluate response- Implement non-pharmacological measures as appropriate and evaluate response- Consider cultural and social influences on pain and pain management- Notify physician/advanced practitioner if interventions unsuccessful or patient reports new pain  Outcome: Progressing     Problem: INFECTION - ADULT  Goal: Absence or prevention of progression during hospitalization  Description: INTERVENTIONS:- Assess and monitor for signs and symptoms of infection- Monitor lab/diagnostic results- Monitor all insertion sites, i.e. indwelling lines, tubes, and drains- Monitor endotracheal if appropriate and nasal secretions for changes in amount and color- [place] appropriate cooling/warming therapies per order- Administer medications as ordered- Instruct and encourage patient and family to use good hand hygiene technique- Identify and instruct in appropriate isolation precautions for identified infection/condition  Outcome: Progressing  Goal: Absence of fever/infection during neutropenic period  Description: INTERVENTIONS:- Monitor WBC  Outcome: Progressing     Problem: DISCHARGE PLANNING  Goal: Discharge to home or other facility with appropriate resources  Description: INTERVENTIONS:- Identify barriers to discharge w/patient and caregiver- Arrange for needed discharge resources and transportation as appropriate- Identify discharge learning needs (meds, wound care, etc.)- Arrange for interpretive services to assist at discharge as needed- Refer to Case Management Department for coordinating discharge planning if the patient needs post-hospital services based on physician/advanced practitioner order or complex needs related to  functional status, cognitive ability, or social support system  Outcome: Progressing     Problem: Knowledge Deficit  Goal: Patient/family/caregiver demonstrates understanding of disease process, treatment plan, medications, and discharge instructions  Description: Complete learning assessment and assess knowledge base.Interventions:- Provide teaching at level of understanding- Provide teaching via preferred learning methods  Outcome: Progressing     Problem: METABOLIC, FLUID AND ELECTROLYTES - ADULT  Goal: Electrolytes maintained within normal limits  Description: INTERVENTIONS:- Monitor labs and assess patient for signs and symptoms of electrolyte imbalances- Administer electrolyte replacement as ordered- Monitor response to electrolyte replacements, including repeat lab results as appropriate- Instruct patient on fluid and nutrition as appropriate  Outcome: Progressing  Goal: Fluid balance maintained  Description: INTERVENTIONS:- Monitor labs - Monitor I/O and WT- Instruct patient on fluid and nutrition as appropriate- Assess for signs & symptoms of volume excess or deficit  Outcome: Progressing

## 2025-04-06 NOTE — ASSESSMENT & PLAN NOTE
Chronic. Patient has had multiple hospitalizations due to Afib. Patient is home on 5 mg Eliquis BID, 120 mg Cardizem SR Q12, and 200 mg Lopressor Q12.  States that he is compliant with his medication    4/5/2025 EKG Afib    Plan  Continue with home Lopressor and Cardizem with holding parameters  Continue home med Eliquis 5 mg BID  Monitor telemetry

## 2025-04-06 NOTE — SPEECH THERAPY NOTE
Speech-Language Pathology Bedside Swallow Screen    Patient Name: Gregg Partida    Today's Date: 4/6/2025     Problem List  Principal Problem:    Alcohol withdrawal (HCC)  Active Problems:    COPD, severity to be determined (HCC)    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 disease (HCC)    Hypomagnesemia    History of prostate cancer    CAD (coronary artery disease)    Acute kidney injury superimposed on CKD  (HCC)    Nicotine abuse    Electrolyte abnormality    SIRS (systemic inflammatory response syndrome) (HCC)    Metabolic acidosis, increased anion gap    History of atrial fibrillation    Hyponatremia    Fall      Past Medical History  Past Medical History:   Diagnosis Date    Acute on chronic kidney failure  (HCC)     Alcohol abuse     Alcohol withdrawal (HCC) 06/07/2019    Atrial fibrillation (HCC)     Cancer (HCC)     prostate ca,had radiation    Cardiac disease     stents,then triple bypass    COPD (chronic obstructive pulmonary disease) (HCC)     Coronary artery disease     ETOH abuse     Heart failure (HCC)     History of heart surgery     [facility]    Hx of heart artery stent     2014    Hyperlipidemia     Hypertension     Hyponatremia 10/17/2019    Hypovolemic shock ([facility]) 12/22/2019    Lumbar spondylitis ([facility]) 10/13/2022    Metabolic acidosis, increased anion gap 04/21/2021    Nasal bone fracture 10/10/2022    Prostate CA (HCC)     S/P CABG x 3     2004    Sleep apnea        Past Surgical History  Past Surgical History:   Procedure Laterality Date    CARDIAC CATHETERIZATION      2 stents    CORONARY ARTERY BYPASS GRAFT      CORONARY ARTERY BYPASS GRAFT  2004    NE ARTHRD ANT INTERBODY MIN DSC CRV BELOW C2 N/A 12/16/2020    Procedure: Anterior cervical discectomy with fusion C4-C7; Posterior cervical decompression and fusion C2-T2;  Surgeon: David Rowell MD;  Location: [facility];  Service: Neurosurgery    TONSILLECTOMY         Current Medical Status  Gregg  Helga is a 64 y.o. male with a PMH of alcohol abuse, CAD, A-fib on anticoagulation, diabetes mellitus type 2, hypertension, COPD, GERD, nicotine dependence, hyponatremia  who presented to the ED after a fall at home. He states that he fell while on the toilet and was unable to get back up prompting EMS transport to the hospital Patient states that his last alcoholic drink was 2 days ago.  Is been drinking approximately 1 bottle of bottle of vodka daily for the past few months.  He did not eat at all the last 2 days.  He complains of tremors and some sweating. He denies any nausea, vomiting, headaches, dizziness, anxiety, chest pain or shortness of breath.    Current Precautions:   Fall    Allergies:  No known food allergies    Past medical history:  Please see H&P for details    Swallow Information   Current Risks for Dysphagia & Aspiration:  Rn swallow screen- passed 4/5/25 22:35;  4/6/2500:01,  08:00  Current Symptoms/Concerns: none as per rn  Current Diet:  4/6/25 00;11 YOAN/CHO/CCC diet ordered.   Baseline Diet: regular diet and thin liquids  Prior [facility] ST/SW evaluations reviewed-> rx regular solids/thin liquids    SLP collaborated with Nicole SORIANO- who relays that pt is tolerating current diet well without concern for aspiration risk. Hence, will discontinue order.     Cari Lima MA, [facility]-SLP  Speech Pathology [place] [phone]

## 2025-04-06 NOTE — PROGRESS NOTES
Progress Note - Family Medicine   Name: Gregg Partida 64 y.o. male I MRN: [id number]  Unit/Bed#: [address] [place] [address] I Date of Admission: 4/5/2025   Date of Service: 4/6/2025 I Hospital Day: 1    Assessment & Plan  Alcohol withdrawal (HCC)  Patient presented to the ER after a fall in the bathroom, he denies any head trauma. Last drink was 2 days ago, he states that he has been drinking a bottle of vodka for the last 3 months until 2 days ago.   Presentation, patient was dehydrated had tremors and sweating.  No signs of altered mental status    In the ER patient received Ativan 2 mg IV, LR 1000 ml bolus * 3    Plan  Pt placed on CIWA protocol   Seizure precautions   Thiamine, folate and multivitamins   IV LR 75 ml/hr   Fall precautions  Seizure precautions  SIRS (systemic inflammatory response syndrome) (HCC)  POA, tachycardia and tachypnea.  Patient was hypotensive in the ED with improvement in blood pressures after receiving IV fluids  Lactic acid improved from 4.3 to 1.1 after IV fluid resuscitation  Likely in the setting of alcohol withdrawal  Patient is afebrile with no leukocytosis    Monitor vitals, unlikely to be an infectious source  Plan under alcohol withdrawal  Acute kidney injury superimposed on CKD  (HCC)  Lab Results   Component Value Date    EGFR 44 04/06/2025    EGFR 45 04/06/2025    EGFR 37 04/05/2025    CREATININE 1.64 (H) 04/06/2025    CREATININE 1.59 (H) 04/06/2025    CREATININE 1.87 (H) 04/05/2025     On admission creatinine 1.87. Baseline around 1.1-1.2.   Likely Prerenal ENMA in the setting of poor p.o. intake and dehydration    ED Course: 3 L NS bolus, 2g IV Mg SO4  CT head: No acute intracranial hemorrhage, significant mass effect or midline shift.   CT spine: No acute cervical spine fracture or traumatic malalignment.      Plan:   75 mL/hr IV NS (PMH of diastolic HF w/ latest LVEF of 65%)   Replete electrolytes as needed   Hold home gabapentin for now  Follow-up chest x-ray  Monitor CMP  Avoid  "nephrotoxic agents  Metabolic acidosis, increased anion gap  On admission anion gap 32 with a bicarb of 12  Likely related to alcohol and starvation ketoacidosis    In the ER, patient received 3 L IV fluid boluses and Valium 5 mg IV    Plan  Continue with LR 75 mL/h  Trend BMP  Consider bicarb administration if acidosis is severe and symptoms persist  Electrolyte abnormality  POA Mag 1.0.  In the ED patient received magnesium sulfate 2 g IV  Patient has chronic hypomagnesemia in the setting of alcohol use    Continue with home magnesium oxide 100 mg twice daily  Monitor and Replete as required  Hyponatremia  Chronic hyponatremia likely secondary to alcohol use. POA .     Continue with IV fluids  Trend BMP  Fall  Pt states that he fell while on the toilet and was unable to get back up prompting EMS transport to the hospital  CT head and CT spine did not show any fracture or trauma  Pt denies any pain currently    Fall precautions  COPD, severity to be determined (HCC)  Chronic stable.  Not in acute exacerbation.  Denies any shortness of breath    Continue home Albuterol inhaler, Breo Ellipta inhaler  CAD (coronary artery disease)  Patient has a h/o CAD s/p CABG. Patient is home on Aspirin 81 mg EC tablet daily, Ranolazine 500 mg Q12, and Eliquis 5 mg BID.     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    Blood Sugar Average: Last 72 hrs:    Chronic. Stable. Patient is home on 500mg Metformin tabs daily     Plan:   Held home Metformin   ISS w/ accu checks  ACHS  Hypoglycemia protocol   YOAN/CHO carb diet   Continue to monitor     History of prostate cancer  Chronic, stable, PMH of prostate CA (2020), s/p resection 2021    Continue home medication flomax 0.4 mg daily  Nicotine " abuse  Chronic stable smoked 1.5 packs x day    Nicotine 21 mg / 24 hour patch  History of atrial fibrillation  Chronic. Patient has had multiple hospitalizations due to Afib. Patient is home on 5 mg Eliquis BID, 120 mg Cardizem SR Q12, and 200 mg Lopressor Q12.  States that he is compliant with his medication    4/5/2025 EKG Afib    Plan  Continue with home Lopressor and Cardizem with holding parameters  Continue home med Eliquis 5 mg BID  Monitor telemetry    VTE Pharmacologic Prophylaxis: VTE Score: 8 Moderate Risk (Score 3-4) - Pharmacological DVT Prophylaxis Ordered: apixaban (Eliquis).    Mobility:   Basic Mobility Inpatient Raw Score: 15  JH-HLM Goal: 4: Move to chair/commode  JH-HLM Achieved: 4: Move to chair/commode  JH-HLM Goal achieved. Continue to encourage appropriate mobility.    Patient Centered Rounds: I performed bedside rounds with nursing staff today.   Discussions with Specialists or Other Care Team Provider: None    Education and Discussions with Family / Patient: Patient declined call to .     Current Length of Stay: 1 day(s)  Current Patient Status: Inpatient   Certification Statement: The patient will continue to require additional inpatient hospital stay due to alcohol withdrawal, ENMA and electrolyte abnormality   Discharge Plan: Anticipate discharge in 24-48 hrs to home.    Code Status: Level 1 - Full Code    Subjective     Patient was seen and examined at bedside.  CIWA score 2 this morning.  Patient notes that he is hungry otherwise he has no complaints.  Patient denies tremors, palpitations, auditory or tactile hallucinations.  Patient denies concerns at this time.    Objective :  Temp:  [98.2 °F (36.8 °C)] 98.2 °F (36.8 °C)  HR:  [] 109  BP: ()/(48-73) 110/73  Resp:  [18-26] 21  SpO2:  [90 %-100 %] 94 %  O2 Device: None (Room air)    Body mass index is 23.95 kg/m².     Input and Output Summary (last 24 hours):     Intake/Output Summary (Last 24 hours) at  4/6/2025 0820  Last data filed at 4/5/2025 2115  Gross per 24 hour   Intake 3050 ml   Output --   Net 3050 ml       Physical Exam  Vitals and nursing note reviewed.   Constitutional:       General: He is not in acute distress.     Appearance: He is well-developed. He is not diaphoretic.   HENT:      Head: Normocephalic and atraumatic.   Eyes:      Conjunctiva/sclera: Conjunctivae normal.   Cardiovascular:      Rate and Rhythm: Normal rate and regular rhythm.      Heart sounds: No murmur heard.  Pulmonary:      Effort: Pulmonary effort is normal. No respiratory distress.      Breath sounds: Normal breath sounds.   Abdominal:      Palpations: Abdomen is soft.      Tenderness: There is no abdominal tenderness.   Musculoskeletal:         General: No swelling.      Cervical back: Neck supple.      Right lower leg: No edema.      Left lower leg: No edema.   Skin:     General: Skin is warm.      Capillary Refill: Capillary refill takes less than 2 seconds.      Comments: Multiple bruises in different stages of healing on BL UE. No open wounds or swelling    Neurological:      General: No focal deficit present.      Mental Status: He is alert. Mental status is at baseline.   Psychiatric:         Mood and Affect: Mood normal.         Lines/Drains:        Telemetry:  Telemetry Orders (From admission, onward)               24 Hour Telemetry Monitoring  Continuous x 24 Hours (Telem)        Comments: Initiate telemetry after reviewing the following labs (CBC, CMP, Mg, Serum ETOH) for abnormal electrolytes and measure QT on EKG for QT prolongation   Expiring   Question:  Reason for 24 Hour Telemetry  Answer:  Alcohol withdrawal and CIWA >7, electrolyte abnormalities, abnormal ECG and/or heart disease                     Telemetry Reviewed: Normal Sinus Rhythm  Indication for Continued Telemetry Use: No indication for continued use. Will discontinue.                Lab Results: I have reviewed the following results:   Results from  last 7 days   Lab Units 04/06/25  0501   WBC Thousand/uL 5.30   HEMOGLOBIN g/dL 9.5*   HEMATOCRIT % 28.3*   PLATELETS Thousands/uL 40*   SEGS PCT % 61   LYMPHO PCT % 19   MONO PCT % 20*   EOS PCT % 0     Results from last 7 days   Lab Units 04/06/25  0501 04/06/25  0025 04/05/25  1911   SODIUM mmol/L 131*   < > 131*   POTASSIUM mmol/L 4.1   < > 4.8   CHLORIDE mmol/L 95*   < > 87*   CO2 mmol/L 18*   < > 12*   BUN mg/dL 42*   < > 41*   CREATININE mg/dL 1.64*   < > 1.87*   ANION GAP mmol/L 18*   < > 32*   CALCIUM mg/dL 7.3*   < > 8.6   ALBUMIN g/dL  --   --  3.9   TOTAL BILIRUBIN mg/dL  --   --  1.76*   ALK PHOS U/L  --   --  130*   ALT U/L  --   --  41   AST U/L  --   --  75*   GLUCOSE RANDOM mg/dL 102   < > 70    < > = values in this interval not displayed.                 Results from last 7 days   Lab Units 04/05/25  2141 04/05/25  1914   LACTIC ACID mmol/L 1.1 4.3*       Recent Cultures (last 7 days):         Imaging Results Review: I personally reviewed the following image studies/reports in PACS and discussed pertinent findings with Radiology: chest xray, CT head, and CT C-spine. My interpretation of the radiology images/reports is: as on report.      Last 24 Hours Medication List:     Current Facility-Administered Medications:     albuterol (PROVENTIL HFA,VENTOLIN HFA) inhaler 2 puff, Q4H PRN    aluminum-magnesium hydroxide-simethicone (MAALOX) oral suspension 30 mL, Q4H PRN    apixaban (ELIQUIS) tablet 5 mg, BID    aspirin chewable tablet 81 mg, Daily    ferrous sulfate tablet 325 mg, Daily With Breakfast    fluticasone-vilanterol 200-25 mcg/actuation 1 puff, Daily    folic acid (FOLVITE) tablet 1,000 mcg, Daily    lactated ringers infusion, Continuous, Last Rate: 75 mL/hr (04/05/25 4819)    lidocaine (LIDODERM) 5 % patch 1 patch, Daily    magnesium Oxide (MAG-OX) tablet 800 mg, BID    magnesium sulfate 4 g/100 mL IVPB (premix) 4 g, Once    metoprolol tartrate (LOPRESSOR) tablet 150 mg, Q12H [facility]    nicotine  (NICODERM CQ) 14 mg/24hr TD 24 hr patch 1 patch, Daily    nicotine (NICODERM CQ) 21 mg/24 hr TD 24 hr patch 1 patch, Daily    pantoprazole (PROTONIX) EC tablet 40 mg, BID    ranolazine (RANEXA) 12 hr tablet 500 mg, Q12H SEDA    senna-docusate sodium (SENOKOT S) 8.6-50 mg per tablet 1 tablet, Daily PRN    tamsulosin (FLOMAX) capsule 0.4 mg, Daily    thiamine (VITAMIN B1) 100 mg in sodium chloride 0.9 % 50 mL IVPB, Daily    Administrative Statements   Today, Patient Was Seen By: Rich Finn MD      **Please Note: This note may have been constructed using a voice recognition system.**

## 2025-04-06 NOTE — ASSESSMENT & PLAN NOTE
Chronic hyponatremia likely secondary to alcohol use. POA .     Continue with IV fluids  Trend BMP

## 2025-04-06 NOTE — ASSESSMENT & PLAN NOTE
POA Mag 1.0.  In the ED patient received magnesium sulfate 2 g IV  Patient has chronic hypomagnesemia in the setting of alcohol use    Continue with home magnesium oxide 100 mg twice daily  Monitor and Replete as required

## 2025-04-06 NOTE — ASSESSMENT & PLAN NOTE
"Lab Results   Component Value Date    HGBA1C 5.4 02/15/2025       No results for input(s): \"POCGLU\" in the last 72 hours.    Blood Sugar Average: Last 72 hrs:    Chronic. Stable. Patient is home on 500mg Metformin tabs daily     Plan:   Held home Metformin   ISS w/ accu checks  ACHS  Hypoglycemia protocol   YOAN/CHO carb diet   Continue to monitor     "

## 2025-04-06 NOTE — ASSESSMENT & PLAN NOTE
POA, tachycardia and tachypnea.  Patient was hypotensive in the ED with improvement in blood pressures after receiving IV fluids  Lactic acid improved from 4.3 to 1.1 after IV fluid resuscitation  Likely in the setting of alcohol withdrawal  Patient is afebrile with no leukocytosis    Monitor vitals, unlikely to be an infectious source  Plan under alcohol withdrawal

## 2025-04-06 NOTE — H&P
H&P - Family Medicine   Name: Gregg Partida 64 y.o. male I MRN: [id number]  Unit/Bed#: [facility] I Date of Admission: 4/5/2025   Date of Service: 4/5/2025 I Hospital Day: 0     Assessment & Plan  Alcohol withdrawal (HCC)  Patient presented to the ER after a fall in the bathroom, he denies any head trauma  His last drink was 2 days ago, he states that he has been drinking a bottle of vodka for the last 3 months until 2 days ago.   He denies any nausea, vomiting, abdominal pain.     Presentation, patient was dehydrated had tremors and sweating.  No signs of altered mental status    In the ER patient received Ativan 2 mg IV, LR 1000 ml bolus * 3    Plan  Pt placed on CIWA protocol   Seizure precautions   Thiamine, folate and multivitamins   IV LR 75 ml/hr   Fall precautions  Seizure precautions  Acute kidney injury superimposed on CKD  ([facility])  Lab Results   Component Value Date    EGFR 37 04/05/2025    EGFR 75 02/18/2025    EGFR 66 02/17/2025    CREATININE 1.87 (H) 04/05/2025    CREATININE 1.1 02/18/2025    CREATININE 1.23 02/17/2025     On admission creatinine 1.87. Baseline around 1.1-1.2.   Likely Prerenal ENMA in the setting of poor p.o. intake and dehydration    ED Course: 3 L NS bolus, 2g IV Mg SO4  CT head: No acute intracranial hemorrhage, significant mass effect or midline shift.   CT spine: No acute cervical spine fracture or traumatic malalignment.      Plan:   75 mL/hr IV NS (PMH of diastolic HF w/ latest LVEF of 65%)   Replete electrolytes as needed   Hold home gabapentin for now  Follow-up chest x-ray  Monitor CMP  Avoid nephrotoxic agents  SIRS (systemic inflammatory response syndrome) ([facility])  Patient met SIRS criteria on admission with tachycardia and tachypnea.  Patient was hypotensive in the ED with improvement in blood pressures after receiving IV fluids  Lactic acid improved from 4.3 to 1.1 after IV fluid resuscitation  Likely in the setting of alcohol withdrawal  Patient is afebrile with no  "leukocytosis      Continue to monitor off antibiotics as unlikely to be an infectious source  Plan under alcohol withdrawal  Fall  Pt states that he fell while on the toilet and was unable to get back up prompting EMS transport to the hospital  CT head and CT spine did not show any fracture or trauma  Pt denies any pain currently    Fall precautions  COPD, severity to be determined (HCC)  Chronic stable.  Not in acute exacerbation.  Denies any shortness of breath    Continue home Albuterol inhaler, Breo Ellipta inhaler  Electrolyte abnormality  POA Mag 1.0.  In the ED patient received magnesium sulfate 2 g IV  Patient has chronic hypomagnesemia in the setting of alcohol use    Continue with home magnesium oxide 100 mg twice daily  Monitor and Replete as required  CAD (coronary artery disease)  Patient has a h/o CAD s/p CABG. Patient is home on Aspirin 81 mg EC tablet daily, Ranolazine 500 mg Q12, and Eliquis 5 mg BID.     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    Blood Sugar Average: Last 72 hrs:    Chronic. Stable. Patient is home on 500mg Metformin tabs daily     Plan:   Held home Metformin   ISS w/ accu checks  ACHS  Hypoglycemia protocol   YOAN/CHO carb diet   Continue to monitor     History of prostate cancer  Chronic, stable, PMH of prostate CA (2020), s/p resection 2021    Continue home medication flomax 0.4 mg daily  Nicotine abuse  Chronic stable smoked 1.5 packs x day    Nicotine 21 mg / 24 hour patch  History of atrial fibrillation  Chronic. Patient has had multiple hospitalizations due to Afib. Patient is home on 5 mg Eliquis BID, 120 mg Cardizem SR Q12, and 200 mg Lopressor Q12.  States that he is compliant with his medication    4/5/2025 EKG Afib    Plan  Continue with " home Lopressor and Cardizem with holding parameters  Continue home med Eliquis 5 mg BID  Monitor telemetry  Metabolic acidosis, increased anion gap  On admission anion gap 32 with a bicarb of 12  Likely related to alcohol and starvation ketoacidosis    In the ER, patient received 3 L IV fluid boluses and Valium 5 mg IV    Plan  Continue with LR 75 mL/h  Trend BMP  Consider bicarb administration if acidosis is severe and symptoms persist  Hyponatremia  Chronic hyponatremia likely secondary to alcohol use. POA .     Continue with IV fluids  Trend BMP      VTE Pharmacologic Prophylaxis: VTE Score: 8  Continue home Eliquis  Code Status: Level 1 - Full Code   Discussion with family: Attempted to update  (son) via phone. Left voicemail.     Anticipated Length of Stay: Patient will be admitted on an inpatient basis with an anticipated length of stay of greater than 2 midnights secondary to alcohol withdrawal.    History of Present Illness   Chief Complaint: Presented to the ED via EMS after a fall at home while sitting on the toilet    Gregg Partida is a 64 y.o. male with a PMH of alcohol abuse, CAD, A-fib on anticoagulation, diabetes mellitus type 2, hypertension, COPD, GERD, nicotine dependence, hyponatremia  who presented to the ED after a fall at home.  He states that he fell while on the toilet and was unable to get back up prompting EMS transport to the hospital  Patient states that his last alcoholic drink was 2 days ago.  Is been drinking approximately 1 bottle of bottle of vodka daily for the past few months.  He did not eat at all the last 2 days.  He complains of tremors and some sweating. He denies any nausea, vomiting, headaches, dizziness, anxiety, chest pain or shortness of breath.      Review of Systems   Constitutional:  Positive for appetite change. Negative for chills and fever.   HENT:  Negative for ear pain and sore throat.    Eyes:  Negative for pain and visual disturbance.    Respiratory:  Negative for cough and shortness of breath.    Cardiovascular:  Negative for chest pain and palpitations.   Gastrointestinal:  Negative for abdominal pain and vomiting.   Genitourinary:  Negative for dysuria and hematuria.   Musculoskeletal:  Negative for arthralgias and back pain.   Skin:  Negative for color change and rash.   Neurological:  Positive for tremors and headaches. Negative for seizures and syncope.   All other systems reviewed and are negative.      Historical Information   Past Medical History:   Diagnosis Date    Acute on chronic kidney failure  (HCC)     Alcohol abuse     Alcohol withdrawal ([facility]) 06/07/2019    Atrial fibrillation (HCC)     Cancer (HCC)     prostate ca,had radiation    Cardiac disease     stents,then triple bypass    COPD (chronic obstructive pulmonary disease) ([facility])     Coronary artery disease     ETOH abuse     Heart failure ([facility])     History of heart surgery     says [facility] bypass [facility]    Hx of heart artery stent     2014    Hyperlipidemia     Hypertension     Hyponatremia 10/17/2019    Hypovolemic shock ([facility]) 12/22/2019    Lumbar spondylitis ([facility]) 10/13/2022    Metabolic acidosis, increased anion gap 04/21/2021    Nasal bone fracture 10/10/2022    Prostate CA ([facility])     S/P CABG x 3     2004    Sleep apnea      Past Surgical History:   Procedure Laterality Date    CARDIAC CATHETERIZATION      2 stents    CORONARY ARTERY BYPASS GRAFT      CORONARY ARTERY BYPASS GRAFT  2004    OK ARTHRD ANT INTERBODY MIN DSC CRV BELOW C2 N/A 12/16/2020    Procedure: Anterior cervical discectomy with fusion C4-C7; Posterior cervical decompression and fusion C2-T2;  Surgeon: David Rowell MD;  Location: [facility];  Service: Neurosurgery    TONSILLECTOMY       Social History     Tobacco Use    Smoking status: Every Day     Current packs/day: 1.50     Average packs/day: 1.5 packs/day for 40.0 years (60.0 ttl pk-yrs)     Types: Cigarettes    Smokeless tobacco: Never   Vaping  Use    Vaping status: Never Used   Substance and Sexual Activity    Alcohol use: Yes     Alcohol/week: 4.0 standard drinks of alcohol     Types: 4 Standard drinks or equivalent per week     Comment: quart of vodka daily    Drug use: No    Sexual activity: Not Currently     E-Cigarette/Vaping    E-Cigarette Use Never User      E-Cigarette/Vaping Substances    Nicotine Yes     THC No     CBD No     Flavoring No     Other No     Unknown No      Family History   Problem Relation Age of Onset    Diabetes Mother     Uterine cancer Mother     COPD Father     Hypertension Father      Social History:  Marital Status: Single   Occupation: No  Patient Pre-hospital Living Situation: Apartment  Patient Pre-hospital Level of Mobility: walks with walker  Patient Pre-hospital Diet Restrictions: No    Meds/Allergies   I have reviewed home medications with patient personally.  Prior to Admission medications    Medication Sig Start Date End Date Taking? Authorizing Provider   albuterol (Ventolin HFA) 90 mcg/act inhaler Inhale 2 puffs every 4 (four) hours as needed for wheezing 1/17/23  Yes Emma Erwin,    aluminum-magnesium hydroxide-simethicone (MAALOX) 6980-5227-267 mg/30 mL suspension Take 30 mL by mouth every 4 (four) hours as needed for indigestion or heartburn 2/14/24  Yes Lois Kitchen,    apixaban (Eliquis) 5 mg TAKE 1 TABLET BY MOUTH 2 TIMES A DAY DO NOT START BEFORE JANUARY 31, 2024. 6/28/24  Yes Pablito Presley MD   aspirin (ECOTRIN LOW STRENGTH) 81 mg EC tablet Take 81 mg by mouth daily   Yes Historical Provider, MD   Blood Glucose Monitoring Suppl (ONE TOUCH ULTRA MINI) w/Device KIT Use as directed 5/16/19  Yes Historical Provider, MD   Blood Pressure Monitoring (Adult Blood Pressure Cuff Lg) KIT Use in the morning 12/14/23  Yes Elliott Zimmerman MD   Breo Ellipta 200-25 MCG/ACT inhaler Inhale 1 puff daily Rinse mouth after use. 6/9/23  Yes Korin Lo MD   diltiazem (CARDIZEM SR) 120 mg 12 hr capsule Take 1  capsule (120 mg total) by mouth every 12 (twelve) hours 12/12/24  Yes Patience Renee MD   folic acid (FOLVITE) 1 mg tablet TAKE 1 TABLET DAILY 2/26/24  Yes Rich Finn MD   gabapentin (NEURONTIN) 300 mg capsule TAKE ONE CAPSULE THREE TIMES DAILY 9/18/24  Yes Lilliana Bliss MD   lidocaine (Lidoderm) 5 % Apply 1 patch topically over 12 hours daily Remove & Discard patch within 12 hours or as directed by MD 12/13/24  Yes Patience Renee MD   magnesium Oxide (MAG-OX) 400 mg TABS Take 2 tablets (800 mg total) by mouth 2 (two) times a day 11/23/24  Yes Pamela Esquivel MD   metFORMIN (GLUCOPHAGE) 500 mg tablet TAKE 1 TABLET DAILY WITH BREAKFAST 4/7/24  Yes Korin Lo MD   metoprolol tartrate (LOPRESSOR) 100 mg tablet Take 1.5 tablets (150 mg total) by mouth every 12 (twelve) hours 1/8/25  Yes Brandy Connors DO   nicotine (NICODERM CQ) 21 mg/24 hr TD 24 hr patch Place 1 patch on the skin over 24 hours daily Do not start before December 4, 2023. 12/4/23  Yes Bipin Freed MD   ONETOUCH DELICA LANCETS FINE MISC 3 (three) times a day Test 5/16/19  Yes Dinora Wick MD   pantoprazole (PROTONIX) 40 mg tablet TAKE 1 TABLET TWO TIMES A DAY 9/18/24  Yes CHELSEA Storm   ranolazine (RANEXA) 500 mg 12 hr tablet TAKE 1 TABLET EVERY 12 HOURS 4/1/24  Yes Korin Lo MD   senna-docusate sodium (SENOKOT S) 8.6-50 mg per tablet Take 1 tablet by mouth daily as needed for constipation 1/27/24  Yes Amilcar Garcia MD   tamsulosin (FLOMAX) 0.4 mg TAKE 1 CAPSULE DAILY 4/5/24  Yes oKrin Lo MD   Thiamine HCl (vitamin B-1) 100 MG TABS TAKE 1 TABLET DAILY 12/15/22  Yes Angel Brennan MD   ferrous sulfate 325 (65 Fe) mg tablet Take 1 tablet (325 mg total) by mouth daily with breakfast 6/2/23   Johnson Pak MD     No Known Allergies    Objective :  Temp:  [98.2 °F (36.8 °C)] 98.2 °F (36.8 °C)  HR:  [] 101  BP: ()/(48-65) 104/55  Resp:  [18-26] 22  SpO2:   [94 %-100 %] 96 %  O2 Device: None (Room air)    Physical Exam  Vitals and nursing note reviewed.   Constitutional:       General: He is not in acute distress.     Appearance: He is well-developed. He is diaphoretic.   HENT:      Head: Normocephalic and atraumatic.   Eyes:      Conjunctiva/sclera: Conjunctivae normal.   Cardiovascular:      Rate and Rhythm: Regular rhythm. Tachycardia present.      Heart sounds: No murmur heard.  Pulmonary:      Effort: Pulmonary effort is normal. No respiratory distress.      Breath sounds: Normal breath sounds.   Abdominal:      Palpations: Abdomen is soft.      Tenderness: There is no abdominal tenderness.   Musculoskeletal:         General: No swelling.      Cervical back: Neck supple.      Right lower leg: No edema.      Left lower leg: No edema.   Skin:     General: Skin is warm.      Capillary Refill: Capillary refill takes less than 2 seconds.   Neurological:      General: No focal deficit present.      Mental Status: He is alert. Mental status is at baseline.   Psychiatric:         Mood and Affect: Mood normal.          Lines/Drains:            Lab Results: I have reviewed the following results:  Results from last 7 days   Lab Units 04/05/25 1911   WBC Thousand/uL 5.76   HEMOGLOBIN g/dL 11.5*   HEMATOCRIT % 35.0*   PLATELETS Thousands/uL 55*   SEGS PCT % 70   LYMPHO PCT % 12*   MONO PCT % 16*   EOS PCT % 0     Results from last 7 days   Lab Units 04/05/25 1911   SODIUM mmol/L 131*   POTASSIUM mmol/L 4.8   CHLORIDE mmol/L 87*   CO2 mmol/L 12*   BUN mg/dL 41*   CREATININE mg/dL 1.87*   ANION GAP mmol/L 32*   CALCIUM mg/dL 8.6   ALBUMIN g/dL 3.9   TOTAL BILIRUBIN mg/dL 1.76*   ALK PHOS U/L 130*   ALT U/L 41   AST U/L 75*   GLUCOSE RANDOM mg/dL 70             Lab Results   Component Value Date    HGBA1C 5.4 02/15/2025    HGBA1C 5.2 11/14/2024    HGBA1C 5.4 08/15/2024     Results from last 7 days   Lab Units 04/05/25 2141 04/05/25 1914   LACTIC ACID mmol/L 1.1 4.3*            Administrative Statements   I have spent a total time of 40 minutes in caring for this patient on the day of the visit/encounter including Diagnostic results, Risks and benefits of tx options, Instructions for management, Patient and family education, Importance of tx compliance, Risk factor reductions, Impressions, and Counseling / Coordination of care.    ** Please Note: This note has been constructed using a voice recognition system. **

## 2025-04-06 NOTE — ASSESSMENT & PLAN NOTE
Pt states that he fell while on the toilet and was unable to get back up prompting EMS transport to the hospital  CT head and CT spine did not show any fracture or trauma  Pt denies any pain currently    Fall precautions

## 2025-04-06 NOTE — PLAN OF CARE
Problem: PAIN - ADULT  Goal: Verbalizes/displays adequate comfort level or baseline comfort level  Description: Interventions:- Encourage patient to monitor pain and request assistance- Assess pain using appropriate pain scale- Administer analgesics based on type and severity of pain and evaluate response- Implement non-pharmacological measures as appropriate and evaluate response- Consider cultural and social influences on pain and pain management- Notify physician/advanced practitioner if interventions unsuccessful or patient reports new pain  Outcome: Progressing     Problem: INFECTION - ADULT  Goal: Absence or prevention of progression during hospitalization  Description: INTERVENTIONS:- Assess and monitor for signs and symptoms of infection- Monitor lab/diagnostic results- Monitor all insertion sites, i.e. indwelling lines, tubes, and drains- Monitor endotracheal if appropriate and nasal secretions for changes in amount and color- [place] appropriate cooling/warming therapies per order- Administer medications as ordered- Instruct and encourage patient and family to use good hand hygiene technique- Identify and instruct in appropriate isolation precautions for identified infection/condition  Outcome: Progressing  Goal: Absence of fever/infection during neutropenic period  Description: INTERVENTIONS:- Monitor WBC  Outcome: Progressing     Problem: SAFETY ADULT  Goal: Patient will remain free of falls  Description: INTERVENTIONS:- Educate patient/family on patient safety including physical limitations- Instruct patient to call for assistance with activity - Consult OT/PT to assist with strengthening/mobility - Keep Call bell within reach- Keep bed low and locked with side rails adjusted as appropriate- Keep care items and personal belongings within reach- Initiate and maintain comfort rounds- Make Fall Risk Sign visible to staff- Offer Toileting every 2 Hours, in advance of need- Initiate/Maintain bed alarm-  Obtain necessary fall risk management equipment: fall risk sign on door - Apply yellow socks and bracelet for high fall risk patients- Consider moving patient to room near nurses station  Outcome: Progressing  Goal: Maintain or return to baseline ADL function  Description: INTERVENTIONS:-  Assess patient's ability to carry out ADLs; assess patient's baseline for ADL function and identify physical deficits which impact ability to perform ADLs (bathing, care of mouth/teeth, toileting, grooming, dressing, etc.)- Assess/evaluate cause of self-care deficits - Assess range of motion- Assess patient's mobility; develop plan if impaired- Assess patient's need for assistive devices and provide as appropriate- Encourage maximum independence but intervene and supervise when necessary- Involve family in performance of ADLs- Assess for home care needs following discharge - Consider OT consult to assist with ADL evaluation and planning for discharge- Provide patient education as appropriate  Outcome: Progressing  Goal: Maintains/Returns to pre admission functional level  Description: INTERVENTIONS:- Perform AM-PAC 6 Click Basic Mobility/ Daily Activity assessment daily.- Set and communicate daily mobility goal to care team and patient/family/caregiver. - Collaborate with rehabilitation services on mobility goals if consulted- Perform Range of Motion 3 times a day.- Reposition patient every 3 hours.- Dangle patient 3 times a day- Stand patient 3 times a day- Ambulate patient 3 times a day- Out of bed to chair 3 times a day - Out of bed for meals 3 times a day- Out of bed for toileting- Record patient progress and toleration of activity level   Outcome: Progressing     Problem: DISCHARGE PLANNING  Goal: Discharge to home or other facility with appropriate resources  Description: INTERVENTIONS:- Identify barriers to discharge w/patient and caregiver- Arrange for needed discharge resources and transportation as appropriate- Identify  discharge learning needs (meds, wound care, etc.)- Arrange for interpretive services to assist at discharge as needed- Refer to Case Management Department for coordinating discharge planning if the patient needs post-hospital services based on physician/advanced practitioner order or complex needs related to functional status, cognitive ability, or social support system  Outcome: Progressing     Problem: Knowledge Deficit  Goal: Patient/family/caregiver demonstrates understanding of disease process, treatment plan, medications, and discharge instructions  Description: Complete learning assessment and assess knowledge base.Interventions:- Provide teaching at level of understanding- Provide teaching via preferred learning methods  Outcome: Progressing     Problem: METABOLIC, FLUID AND ELECTROLYTES - ADULT  Goal: Electrolytes maintained within normal limits  Description: INTERVENTIONS:- Monitor labs and assess patient for signs and symptoms of electrolyte imbalances- Administer electrolyte replacement as ordered- Monitor response to electrolyte replacements, including repeat lab results as appropriate- Instruct patient on fluid and nutrition as appropriate  Outcome: Progressing  Goal: Fluid balance maintained  Description: INTERVENTIONS:- Monitor labs - Monitor I/O and WT- Instruct patient on fluid and nutrition as appropriate- Assess for signs & symptoms of volume excess or deficit  Outcome: Progressing

## 2025-04-06 NOTE — ASSESSMENT & PLAN NOTE
Lab Results   Component Value Date    EGFR 44 04/06/2025    EGFR 45 04/06/2025    EGFR 37 04/05/2025    CREATININE 1.64 (H) 04/06/2025    CREATININE 1.59 (H) 04/06/2025    CREATININE 1.87 (H) 04/05/2025     On admission creatinine 1.87. Baseline around 1.1-1.2.   Likely Prerenal ENMA in the setting of poor p.o. intake and dehydration    ED Course: 3 L NS bolus, 2g IV Mg SO4  CT head: No acute intracranial hemorrhage, significant mass effect or midline shift.   CT spine: No acute cervical spine fracture or traumatic malalignment.      Plan:   75 mL/hr IV NS (PMH of diastolic HF w/ latest LVEF of 65%)   Replete electrolytes as needed   Hold home gabapentin for now  Follow-up chest x-ray  Monitor CMP  Avoid nephrotoxic agents

## 2025-04-06 NOTE — ASSESSMENT & PLAN NOTE
Patient presented to the ER after a fall in the bathroom, he denies any head trauma. Last drink was 2 days ago, he states that he has been drinking a bottle of vodka for the last 3 months until 2 days ago.   Presentation, patient was dehydrated had tremors and sweating.  No signs of altered mental status    In the ER patient received Ativan 2 mg IV, LR 1000 ml bolus * 3    Plan  Pt placed on CIWA protocol   Seizure precautions   Thiamine, folate and multivitamins   IV LR 75 ml/hr   Fall precautions  Seizure precautions

## 2025-04-06 NOTE — ASSESSMENT & PLAN NOTE
Lab Results   Component Value Date    EGFR 37 04/05/2025    EGFR 75 02/18/2025    EGFR 66 02/17/2025    CREATININE 1.87 (H) 04/05/2025    CREATININE 1.1 02/18/2025    CREATININE 1.23 02/17/2025     On admission creatinine 1.87. Baseline around 1.1-1.2.   Likely Prerenal ENMA in the setting of poor p.o. intake and dehydration    ED Course: 3 L NS bolus, 2g IV Mg SO4  CT head: No acute intracranial hemorrhage, significant mass effect or midline shift.   CT spine: No acute cervical spine fracture or traumatic malalignment.      Plan:   75 mL/hr IV NS (PMH of diastolic HF w/ latest LVEF of 65%)   Replete electrolytes as needed   Hold home gabapentin for now  Follow-up chest x-ray  Monitor CMP  Avoid nephrotoxic agents

## 2025-04-06 NOTE — SEPSIS NOTE
Sepsis Note   Gregg Partida 64 y.o. male MRN: [id number]  Unit/Bed#: ED 06 Encounter: [id number]       Initial Sepsis Screening       Row Name 04/05/25 2212                Is the patient's history suggestive of a new or worsening infection? Yes (Proceed)  -BN        Suspected source of infection suspect infection, source unknown  -BN        Indicate SIRS criteria Tachycardia > 90 bpm;Tachypnea > 20 resp per min  -BN        Are two or more of the above signs & symptoms of infection both present and new to the patient? Yes (Proceed)  -BN        Assess for evidence of organ dysfunction: Are any of the below criteria present within 6 hours of suspected infection and SIRS criteria that are NOT considered to be chronic conditions? --                  User Key  (r) = Recorded By, (t) = Taken By, (c) = Cosigned By      Initials Name Provider Type    DEEDEE Hinojosa MD Resident                      There is no height or weight on file to calculate BMI.  Wt Readings from Last 1 Encounters:   01/06/25 83.9 kg (185 lb)        Ideal body weight: 82.2 kg (181 lb 3.5 oz)  Adjusted ideal body weight: 82.9 kg (182 lb 11.7 oz)    Ordered Sepsis Labs, Fluid Bolus.

## 2025-04-06 NOTE — ASSESSMENT & PLAN NOTE
Patient presented to the ER after a fall in the bathroom, he denies any head trauma  His last drink was 2 days ago, he states that he has been drinking a bottle of vodka for the last 3 months until 2 days ago.   He denies any nausea, vomiting, abdominal pain.     Presentation, patient was dehydrated had tremors and sweating.  No signs of altered mental status    In the ER patient received Ativan 2 mg IV, LR 1000 ml bolus * 3    Plan  Pt placed on CIWA protocol   Seizure precautions   Thiamine, folate and multivitamins   IV LR 75 ml/hr   Fall precautions  Seizure precautions

## 2025-04-06 NOTE — ASSESSMENT & PLAN NOTE
On admission anion gap 32 with a bicarb of 12  Likely related to alcohol and starvation ketoacidosis    In the ER, patient received 3 L IV fluid boluses and Valium 5 mg IV    Plan  Continue with LR 75 mL/h  Trend BMP  Consider bicarb administration if acidosis is severe and symptoms persist

## 2025-04-06 NOTE — ASSESSMENT & PLAN NOTE
Chronic stable.  Not in acute exacerbation.  Denies any shortness of breath    Continue home Albuterol inhaler, Breo Ellipta inhaler

## 2025-04-06 NOTE — ASSESSMENT & PLAN NOTE
Patient met SIRS criteria on admission with tachycardia and tachypnea.  Patient was hypotensive in the ED with improvement in blood pressures after receiving IV fluids  Lactic acid improved from 4.3 to 1.1 after IV fluid resuscitation  Likely in the setting of alcohol withdrawal  Patient is afebrile with no leukocytosis      Continue to monitor off antibiotics as unlikely to be an infectious source  Plan under alcohol withdrawal

## 2025-04-06 NOTE — ASSESSMENT & PLAN NOTE
Chronic, stable, PMH of prostate CA (2020), s/p resection 2021    Continue home medication flomax 0.4 mg daily

## 2025-04-07 PROBLEM — T79.6XXA TRAUMATIC RHABDOMYOLYSIS (HCC): Status: ACTIVE | Noted: 2025-04-07

## 2025-04-07 PROBLEM — E87.29 METABOLIC ACIDOSIS, INCREASED ANION GAP: Status: RESOLVED | Noted: 2021-04-21 | Resolved: 2025-04-07

## 2025-04-07 PROBLEM — N17.9 ACUTE KIDNEY INJURY SUPERIMPOSED ON CKD  (HCC): Status: RESOLVED | Noted: 2019-06-07 | Resolved: 2025-04-07

## 2025-04-07 PROBLEM — N18.9 ACUTE KIDNEY INJURY SUPERIMPOSED ON CKD  (HCC): Status: RESOLVED | Noted: 2019-06-07 | Resolved: 2025-04-07

## 2025-04-07 PROBLEM — T79.6XXA TRAUMATIC RHABDOMYOLYSIS (HCC): Status: RESOLVED | Noted: 2025-04-07 | Resolved: 2025-04-07

## 2025-04-07 LAB
ANION GAP SERPL CALCULATED.3IONS-SCNC: 12 MMOL/L (ref 4–13)
ATRIAL RATE: 120 BPM
BASOPHILS # BLD AUTO: 0.02 THOUSANDS/ÂΜL (ref 0–0.1)
BASOPHILS NFR BLD AUTO: 0 % (ref 0–1)
BUN SERPL-MCNC: 29 MG/DL (ref 5–25)
CALCIUM SERPL-MCNC: 7.9 MG/DL (ref 8.4–10.2)
CHLORIDE SERPL-SCNC: 94 MMOL/L (ref 96–108)
CK SERPL-CCNC: 545 U/L (ref 39–308)
CO2 SERPL-SCNC: 23 MMOL/L (ref 21–32)
CREAT SERPL-MCNC: 1.18 MG/DL (ref 0.6–1.3)
EOSINOPHIL # BLD AUTO: 0.06 THOUSAND/ÂΜL (ref 0–0.61)
EOSINOPHIL NFR BLD AUTO: 1 % (ref 0–6)
ERYTHROCYTE [DISTWIDTH] IN BLOOD BY AUTOMATED COUNT: 18.9 % (ref 11.6–15.1)
GFR SERPL CREATININE-BSD FRML MDRD: 65 ML/MIN/1.73SQ M
GLUCOSE SERPL-MCNC: 165 MG/DL (ref 65–140)
HCT VFR BLD AUTO: 27.6 % (ref 36.5–49.3)
HGB BLD-MCNC: 9.3 G/DL (ref 12–17)
IMM GRANULOCYTES # BLD AUTO: 0.04 THOUSAND/UL (ref 0–0.2)
IMM GRANULOCYTES NFR BLD AUTO: 1 % (ref 0–2)
LYMPHOCYTES # BLD AUTO: 0.92 THOUSANDS/ÂΜL (ref 0.6–4.47)
LYMPHOCYTES NFR BLD AUTO: 18 % (ref 14–44)
MAGNESIUM SERPL-MCNC: 1.2 MG/DL (ref 1.9–2.7)
MAGNESIUM SERPL-MCNC: 1.4 MG/DL (ref 1.9–2.7)
MCH RBC QN AUTO: 30.3 PG (ref 26.8–34.3)
MCHC RBC AUTO-ENTMCNC: 33.7 G/DL (ref 31.4–37.4)
MCV RBC AUTO: 90 FL (ref 82–98)
MONOCYTES # BLD AUTO: 0.8 THOUSAND/ÂΜL (ref 0.17–1.22)
MONOCYTES NFR BLD AUTO: 16 % (ref 4–12)
NEUTROPHILS # BLD AUTO: 3.32 THOUSANDS/ÂΜL (ref 1.85–7.62)
NEUTS SEG NFR BLD AUTO: 64 % (ref 43–75)
NRBC BLD AUTO-RTO: 0 /100 WBCS
PLATELET # BLD AUTO: 42 THOUSANDS/UL (ref 149–390)
PMV BLD AUTO: 11.7 FL (ref 8.9–12.7)
POTASSIUM SERPL-SCNC: 3.7 MMOL/L (ref 3.5–5.3)
QRS AXIS: 98 DEGREES
QRSD INTERVAL: 80 MS
QT INTERVAL: 348 MS
QTC INTERVAL: 481 MS
RBC # BLD AUTO: 3.07 MILLION/UL (ref 3.88–5.62)
SODIUM SERPL-SCNC: 129 MMOL/L (ref 135–147)
T WAVE AXIS: 41 DEGREES
VENTRICULAR RATE: 115 BPM
WBC # BLD AUTO: 5.16 THOUSAND/UL (ref 4.31–10.16)

## 2025-04-07 PROCEDURE — 80048 BASIC METABOLIC PNL TOTAL CA: CPT

## 2025-04-07 PROCEDURE — 82550 ASSAY OF CK (CPK): CPT | Performed by: FAMILY MEDICINE

## 2025-04-07 PROCEDURE — 83735 ASSAY OF MAGNESIUM: CPT

## 2025-04-07 PROCEDURE — 85025 COMPLETE CBC W/AUTO DIFF WBC: CPT

## 2025-04-07 PROCEDURE — 93010 ELECTROCARDIOGRAM REPORT: CPT | Performed by: INTERNAL MEDICINE

## 2025-04-07 PROCEDURE — 99232 SBSQ HOSP IP/OBS MODERATE 35: CPT | Performed by: FAMILY MEDICINE

## 2025-04-07 RX ORDER — LIDOCAINE 50 MG/G
1 PATCH TOPICAL DAILY
Status: DISCONTINUED | OUTPATIENT
Start: 2025-04-07 | End: 2025-04-07

## 2025-04-07 RX ORDER — MAGNESIUM SULFATE HEPTAHYDRATE 40 MG/ML
4 INJECTION, SOLUTION INTRAVENOUS ONCE
Status: COMPLETED | OUTPATIENT
Start: 2025-04-07 | End: 2025-04-07

## 2025-04-07 RX ORDER — LIDOCAINE 50 MG/G
1 PATCH TOPICAL DAILY
Status: DISCONTINUED | OUTPATIENT
Start: 2025-04-07 | End: 2025-04-09 | Stop reason: HOSPADM

## 2025-04-07 RX ORDER — NICOTINE 21 MG/24HR
1 PATCH, TRANSDERMAL 24 HOURS TRANSDERMAL DAILY
Status: DISCONTINUED | OUTPATIENT
Start: 2025-04-07 | End: 2025-04-09 | Stop reason: HOSPADM

## 2025-04-07 RX ORDER — MAGNESIUM SULFATE HEPTAHYDRATE 40 MG/ML
4 INJECTION, SOLUTION INTRAVENOUS ONCE
Status: COMPLETED | OUTPATIENT
Start: 2025-04-07 | End: 2025-04-08

## 2025-04-07 RX ADMIN — APIXABAN 5 MG: 5 TABLET, FILM COATED ORAL at 17:58

## 2025-04-07 RX ADMIN — DILTIAZEM HYDROCHLORIDE 120 MG: 60 CAPSULE, EXTENDED RELEASE ORAL at 21:37

## 2025-04-07 RX ADMIN — LIDOCAINE 1 PATCH: 50 PATCH CUTANEOUS at 09:41

## 2025-04-07 RX ADMIN — PANTOPRAZOLE SODIUM 40 MG: 40 TABLET, DELAYED RELEASE ORAL at 09:41

## 2025-04-07 RX ADMIN — RANOLAZINE 500 MG: 500 TABLET, FILM COATED, EXTENDED RELEASE ORAL at 21:37

## 2025-04-07 RX ADMIN — ASPIRIN 81 MG: 81 TABLET, CHEWABLE ORAL at 09:41

## 2025-04-07 RX ADMIN — FLUTICASONE FUROATE AND VILANTEROL TRIFENATATE 1 PUFF: 200; 25 POWDER RESPIRATORY (INHALATION) at 09:42

## 2025-04-07 RX ADMIN — APIXABAN 5 MG: 5 TABLET, FILM COATED ORAL at 09:41

## 2025-04-07 RX ADMIN — METOPROLOL TARTRATE 150 MG: 100 TABLET, FILM COATED ORAL at 21:37

## 2025-04-07 RX ADMIN — THIAMINE HCL TAB 100 MG 100 MG: 100 TAB at 09:41

## 2025-04-07 RX ADMIN — RANOLAZINE 500 MG: 500 TABLET, FILM COATED, EXTENDED RELEASE ORAL at 09:41

## 2025-04-07 RX ADMIN — FOLIC ACID 1000 MCG: 1 TABLET ORAL at 09:41

## 2025-04-07 RX ADMIN — MAGNESIUM SULFATE HEPTAHYDRATE 4 G: 40 INJECTION, SOLUTION INTRAVENOUS at 23:00

## 2025-04-07 RX ADMIN — PANTOPRAZOLE SODIUM 40 MG: 40 TABLET, DELAYED RELEASE ORAL at 21:37

## 2025-04-07 RX ADMIN — MAGNESIUM SULFATE HEPTAHYDRATE 4 G: 40 INJECTION, SOLUTION INTRAVENOUS at 07:37

## 2025-04-07 RX ADMIN — METOPROLOL TARTRATE 150 MG: 100 TABLET, FILM COATED ORAL at 09:41

## 2025-04-07 RX ADMIN — FERROUS SULFATE TAB 325 MG (65 MG ELEMENTAL FE) 325 MG: 325 (65 FE) TAB at 07:39

## 2025-04-07 RX ADMIN — TAMSULOSIN HYDROCHLORIDE 0.4 MG: 0.4 CAPSULE ORAL at 09:41

## 2025-04-07 RX ADMIN — NICOTINE 1 PATCH: 21 PATCH, EXTENDED RELEASE TRANSDERMAL at 07:41

## 2025-04-07 NOTE — ASSESSMENT & PLAN NOTE
POA Mag 1.0.  In the ED patient received magnesium sulfate 2 g IV  Patient has chronic hypomagnesemia in the setting of alcohol use  Magnesium holding at 1.4 after IV Mag 4g. Patient had large loose BM per nurse.     Replete with IV mag   Continue with home magnesium oxide 800 mg twice daily  Monitor and Replete as required

## 2025-04-07 NOTE — PLAN OF CARE
Problem: PAIN - ADULT  Goal: Verbalizes/displays adequate comfort level or baseline comfort level  Description: Interventions:- Encourage patient to monitor pain and request assistance- Assess pain using appropriate pain scale- Administer analgesics based on type and severity of pain and evaluate response- Implement non-pharmacological measures as appropriate and evaluate response- Consider cultural and social influences on pain and pain management- Notify physician/advanced practitioner if interventions unsuccessful or patient reports new pain  Outcome: Progressing     Problem: INFECTION - ADULT  Goal: Absence or prevention of progression during hospitalization  Description: INTERVENTIONS:- Assess and monitor for signs and symptoms of infection- Monitor lab/diagnostic results- Monitor all insertion sites, i.e. indwelling lines, tubes, and drains- Monitor endotracheal if appropriate and nasal secretions for changes in amount and color- [place] appropriate cooling/warming therapies per order- Administer medications as ordered- Instruct and encourage patient and family to use good hand hygiene technique- Identify and instruct in appropriate isolation precautions for identified infection/condition  Outcome: Progressing  Goal: Absence of fever/infection during neutropenic period  Description: INTERVENTIONS:- Monitor WBC  Outcome: Progressing     Problem: SAFETY ADULT  Goal: Patient will remain free of falls  Description: INTERVENTIONS:- Educate patient/family on patient safety including physical limitations- Instruct patient to call for assistance with activity - Consult OT/PT to assist with strengthening/mobility - Keep Call bell within reach- Keep bed low and locked with side rails adjusted as appropriate- Keep care items and personal belongings within reach- Initiate and maintain comfort rounds- Make Fall Risk Sign visible to staff- Offer Toileting every 2  Hours, in advance of need- Initiate/Maintain bed alarm-  Obtain necessary fall risk management equipment: bed alarm- Apply yellow socks and bracelet for high fall risk patients- Consider moving patient to room near nurses station  Outcome: Progressing  Goal: Maintain or return to baseline ADL function  Description: INTERVENTIONS:-  Assess patient's ability to carry out ADLs; assess patient's baseline for ADL function and identify physical deficits which impact ability to perform ADLs (bathing, care of mouth/teeth, toileting, grooming, dressing, etc.)- Assess/evaluate cause of self-care deficits - Assess range of motion- Assess patient's mobility; develop plan if impaired- Assess patient's need for assistive devices and provide as appropriate- Encourage maximum independence but intervene and supervise when necessary- Involve family in performance of ADLs- Assess for home care needs following discharge - Consider OT consult to assist with ADL evaluation and planning for discharge- Provide patient education as appropriate  Outcome: Progressing  Goal: Maintains/Returns to pre admission functional level  Description: INTERVENTIONS:- Perform AM-PAC 6 Click Basic Mobility/ Daily Activity assessment daily.- Set and communicate daily mobility goal to care team and patient/family/caregiver. - Collaborate with rehabilitation services on mobility goals if consulted- Perform Range of Motion 3 times a day.- Reposition patient every 2 hours.- Dangle patient 3 times a day- Stand patient 3 times a day- Ambulate patient 3 times a day- Out of bed to chair 3 times a day - Out of bed for meals 3 times a day- Out of bed for toileting- Record patient progress and toleration of activity level   Outcome: Progressing     Problem: DISCHARGE PLANNING  Goal: Discharge to home or other facility with appropriate resources  Description: INTERVENTIONS:- Identify barriers to discharge w/patient and caregiver- Arrange for needed discharge resources and transportation as appropriate- Identify discharge  learning needs (meds, wound care, etc.)- Arrange for interpretive services to assist at discharge as needed- Refer to Case Management Department for coordinating discharge planning if the patient needs post-hospital services based on physician/advanced practitioner order or complex needs related to functional status, cognitive ability, or social support system  Outcome: Progressing     Problem: Knowledge Deficit  Goal: Patient/family/caregiver demonstrates understanding of disease process, treatment plan, medications, and discharge instructions  Description: Complete learning assessment and assess knowledge base.Interventions:- Provide teaching at level of understanding- Provide teaching via preferred learning methods  Outcome: Progressing     Problem: METABOLIC, FLUID AND ELECTROLYTES - ADULT  Goal: Electrolytes maintained within normal limits  Description: INTERVENTIONS:- Monitor labs and assess patient for signs and symptoms of electrolyte imbalances- Administer electrolyte replacement as ordered- Monitor response to electrolyte replacements, including repeat lab results as appropriate- Instruct patient on fluid and nutrition as appropriate  Outcome: Progressing  Goal: Fluid balance maintained  Description: INTERVENTIONS:- Monitor labs - Monitor I/O and WT- Instruct patient on fluid and nutrition as appropriate- Assess for signs & symptoms of volume excess or deficit  Outcome: Progressing

## 2025-04-07 NOTE — ASSESSMENT & PLAN NOTE
POA, tachycardia and tachypnea, less likely infectious origin. Evidence of  acute organ dysfunction (ENMA) due to dehydration, alcohol abuse and withdrawal in the setting of ENMA, thrombocytopenia, and elevate lactate.     Lactic acid improved from 4.3 to 1.1 after IV fluid resuscitation  Patient is afebrile with no leukocytosis    Treated with IVF bolus and serial labs   Monitor vitals, unlikely to be an infectious source  Plan under alcohol withdrawal

## 2025-04-07 NOTE — ASSESSMENT & PLAN NOTE
Patient presented to the ER after a fall in the bathroom, he denies any head trauma. Last drink was 2 days ago, he states that he has been drinking a bottle of vodka for the last 3 months until 2 days ago.   Presentation, patient was dehydrated had tremors and sweating.  No signs of altered mental status    In the ER patient received Valium 5mg IV, Ativan 2 mg IV, LR 1000 ml bolus * 3  Received 3 doses of IV Ativan 2 mg on 4/6     Plan  Pt placed on CIWA protocol   Seizure precautions   Thiamine, folate and multivitamins   Discontinued IV LR 75 ml/hr   Fall precautions  Seizure precautions

## 2025-04-07 NOTE — PLAN OF CARE
Problem: PAIN - ADULT  Goal: Verbalizes/displays adequate comfort level or baseline comfort level  Description: Interventions:- Encourage patient to monitor pain and request assistance- Assess pain using appropriate pain scale- Administer analgesics based on type and severity of pain and evaluate response- Implement non-pharmacological measures as appropriate and evaluate response- Consider cultural and social influences on pain and pain management- Notify physician/advanced practitioner if interventions unsuccessful or patient reports new pain  Outcome: Progressing     Problem: INFECTION - ADULT  Goal: Absence or prevention of progression during hospitalization  Description: INTERVENTIONS:- Assess and monitor for signs and symptoms of infection- Monitor lab/diagnostic results- Monitor all insertion sites, i.e. indwelling lines, tubes, and drains- Monitor endotracheal if appropriate and nasal secretions for changes in amount and color- [place] appropriate cooling/warming therapies per order- Administer medications as ordered- Instruct and encourage patient and family to use good hand hygiene technique- Identify and instruct in appropriate isolation precautions for identified infection/condition  Outcome: Progressing  Goal: Absence of fever/infection during neutropenic period  Description: INTERVENTIONS:- Monitor WBC  Outcome: Progressing     Problem: SAFETY ADULT  Goal: Patient will remain free of falls  Description: INTERVENTIONS:- Educate patient/family on patient safety including physical limitations- Instruct patient to call for assistance with activity - Consult OT/PT to assist with strengthening/mobility - Keep Call bell within reach- Keep bed low and locked with side rails adjusted as appropriate- Keep care items and personal belongings within reach- Initiate and maintain comfort rounds- Make Fall Risk Sign visible to staff- Offer Toileting every 2 Hours, in advance of need- Initiate/Maintain bed alarm-  Obtain necessary fall risk management equipment: - Apply yellow socks and bracelet for high fall risk patients- Consider moving patient to room near nurses station  Outcome: Progressing  Goal: Maintain or return to baseline ADL function  Description: INTERVENTIONS:-  Assess patient's ability to carry out ADLs; assess patient's baseline for ADL function and identify physical deficits which impact ability to perform ADLs (bathing, care of mouth/teeth, toileting, grooming, dressing, etc.)- Assess/evaluate cause of self-care deficits - Assess range of motion- Assess patient's mobility; develop plan if impaired- Assess patient's need for assistive devices and provide as appropriate- Encourage maximum independence but intervene and supervise when necessary- Involve family in performance of ADLs- Assess for home care needs following discharge - Consider OT consult to assist with ADL evaluation and planning for discharge- Provide patient education as appropriate  Outcome: Progressing  Goal: Maintains/Returns to pre admission functional level  Description: INTERVENTIONS:- Perform AM-PAC 6 Click Basic Mobility/ Daily Activity assessment daily.- Set and communicate daily mobility goal to care team and patient/family/caregiver. - Collaborate with rehabilitation services on mobility goals if consulted- Perform Range of Motion 3 times a day.- Reposition patient every 2 hours.- Dangle patient 3 times a day- Stand patient 3 times a day- Ambulate patient 3 times a day- Out of bed to chair 3 times a day - Out of bed for meals 3 times a day- Out of bed for toileting- Record patient progress and toleration of activity level   Outcome: Progressing     Problem: DISCHARGE PLANNING  Goal: Discharge to home or other facility with appropriate resources  Description: INTERVENTIONS:- Identify barriers to discharge w/patient and caregiver- Arrange for needed discharge resources and transportation as appropriate- Identify discharge learning  needs (meds, wound care, etc.)- Arrange for interpretive services to assist at discharge as needed- Refer to Case Management Department for coordinating discharge planning if the patient needs post-hospital services based on physician/advanced practitioner order or complex needs related to functional status, cognitive ability, or social support system  Outcome: Progressing     Problem: Knowledge Deficit  Goal: Patient/family/caregiver demonstrates understanding of disease process, treatment plan, medications, and discharge instructions  Description: Complete learning assessment and assess knowledge base.Interventions:- Provide teaching at level of understanding- Provide teaching via preferred learning methods  Outcome: Progressing     Problem: METABOLIC, FLUID AND ELECTROLYTES - ADULT  Goal: Electrolytes maintained within normal limits  Description: INTERVENTIONS:- Monitor labs and assess patient for signs and symptoms of electrolyte imbalances- Administer electrolyte replacement as ordered- Monitor response to electrolyte replacements, including repeat lab results as appropriate- Instruct patient on fluid and nutrition as appropriate  Outcome: Progressing  Goal: Fluid balance maintained  Description: INTERVENTIONS:- Monitor labs - Monitor I/O and WT- Instruct patient on fluid and nutrition as appropriate- Assess for signs & symptoms of volume excess or deficit  Outcome: Progressing

## 2025-04-07 NOTE — DISCHARGE INSTR - AVS FIRST PAGE
Please follow-up with Иван on  for next wedneday 4/16 at 1130 with Dr. Aparicio.   Please get your blood work completed prior to your follow up appointment with иван   Please limit your fluid intake to 1500 ml daily   Please be sure to take Ensure 1 can twice daily to help with salt replacement.   Please be sure to take your magnesium tablets   Please continue to take probiotics (Florastor)

## 2025-04-07 NOTE — PROGRESS NOTES
Progress Note - Family Medicine   Name: Gregg Partida 64 y.o. male I MRN: [id number]  Unit/Bed#: 4 [address] I Date of Admission: 4/5/2025   Date of Service: 4/7/2025 I Hospital Day: 2    Assessment & Plan  Alcohol withdrawal (HCC)  Patient presented to the ER after a fall in the bathroom, he denies any head trauma. Last drink was 2 days ago, he states that he has been drinking a bottle of vodka for the last 3 months until 2 days ago.   Presentation, patient was dehydrated had tremors and sweating.  No signs of altered mental status    In the ER patient received Valium 5mg IV, Ativan 2 mg IV, LR 1000 ml bolus * 3  Received 3 doses of IV Ativan 2 mg on 4/6     Plan  Pt placed on CIWA protocol   Seizure precautions   Thiamine, folate and multivitamins   Discontinued IV LR 75 ml/hr   Fall precautions  Seizure precautions  Afib with RVR  Chronic. Stable   Home regimen -  5 mg Eliquis BID, 120 mg Cardizem SR Q12, and 200 mg Lopressor Q12.  Noted to have elevated HR upto 170s during hospital course     4/5/2025 EKG Afib    Plan  Received Digoxin x1 (4/6) due to low BP, unable to tolerate lopressor. Required IV lopressor 5 mg overnight (4/6)  Continue with home Lopressor and Cardizem with holding parameters  Continue home med Eliquis 5 mg BID  Monitor telemetry  Electrolyte abnormality  POA Mag 1.0.  In the ED patient received magnesium sulfate 2 g IV  Patient has chronic hypomagnesemia in the setting of alcohol use  Magnesium holding at 1.4 after IV Mag 4g. Patient had large loose BM per nurse.     Replete with IV mag   Continue with home magnesium oxide 800 mg twice daily  Monitor and Replete as required  SIRS (systemic inflammatory response syndrome) (HCC)  POA, tachycardia and tachypnea, less likely infectious origin. Evidence of  acute organ dysfunction (ENMA) due to dehydration, alcohol abuse and withdrawal in the setting of ENMA, thrombocytopenia, and elevate lactate.     Lactic acid improved from 4.3 to 1.1  after IV fluid resuscitation  Patient is afebrile with no leukocytosis    Treated with IVF bolus and serial labs   Monitor vitals, unlikely to be an infectious source  Plan under alcohol withdrawal  Acute kidney injury superimposed on CKD  (HCC) (Resolved: 4/7/2025)  Lab Results   Component Value Date    EGFR 65 04/07/2025    EGFR 44 04/06/2025    EGFR 45 04/06/2025    CREATININE 1.18 04/07/2025    CREATININE 1.64 (H) 04/06/2025    CREATININE 1.59 (H) 04/06/2025     On admission creatinine 1.87. Baseline around 1.1-1.2.   Likely Prerenal ENMA in the setting of poor p.o. intake and dehydration    ED Course: 3 L NS bolus, 2g IV Mg SO4  CT head: No acute intracranial hemorrhage, significant mass effect or midline shift.   CT spine: No acute cervical spine fracture or traumatic malalignment.      Plan:   Cr back to baseline after being treated with 75 mL/hr IV NS (PMH of diastolic HF w/ latest LVEF of 65%), discontinued IVF  Replete electrolytes as needed   Resume home gabapentin   Monitor CMP  Avoid nephrotoxic agents  Metabolic acidosis, increased anion gap (Resolved: 4/7/2025)  On admission anion gap 32 with a bicarb of 12  Likely related to alcohol and starvation ketoacidosis    In the ER, patient received 3 L IV fluid boluses and Valium 5 mg IV    Plan  Continue with LR 75 mL/h  Trend BMP  Consider bicarb administration if acidosis is severe and symptoms persist  Hyponatremia  Chronic hyponatremia likely secondary to alcohol use. POA .     Fluid restriction up to 1500 ml /day   Ensure 1 can BID daily to help with salt replacement as patient does not like taking salt tabs   Trend BMP  Fall  Pt states that he fell while on the toilet and was unable to get back up prompting EMS transport to the hospital  CT head and CT spine did not show any fracture or trauma  Pt denies any pain currently    Fall precautions  COPD, severity to be determined (HCC)  Chronic stable.  Not in acute exacerbation.  Denies any shortness  "of breath    Continue home Albuterol inhaler, Breo Ellipta inhaler  CAD (coronary artery disease)  Patient has a h/o CAD s/p CABG. Patient is home on Aspirin 81 mg EC tablet daily, Ranolazine 500 mg Q12, and Eliquis 5 mg BID.     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    Blood Sugar Average: Last 72 hrs:    Chronic. Stable. Patient is home on 500mg Metformin tabs daily     Plan:   Held home Metformin   ISS w/ accu checks  ACHS  Hypoglycemia protocol   YOAN/CHO carb diet   Continue to monitor     History of prostate cancer  Chronic, stable, PMH of prostate CA (2020), s/p resection 2021    Continue home medication flomax 0.4 mg daily  Nicotine abuse  Chronic stable smoked 1.5 packs x day    Nicotine 21 mg / 24 hour patch  Traumatic rhabdomyolysis (HCC) (Resolved: 4/7/2025)  POA due to fall, unknown down time a/e/b multiple bruises over body, elevated total CK after IVF boluses  Total     Treated with 3 liters IVF bolus, IV hydration  Continue PO intake     VTE Pharmacologic Prophylaxis: VTE Score: 8 High Risk (Score >/= 5) - Pharmacological DVT Prophylaxis Ordered: apixaban (Eliquis). Sequential Compression Devices Ordered.    Mobility:   Basic Mobility Inpatient Raw Score: 15  JH-HLM Goal: 4: Move to chair/commode  JH-HLM Achieved: 4: Move to chair/commode  JH-HLM Goal achieved. Continue to encourage appropriate mobility.    Patient Centered Rounds: I performed bedside rounds with nursing staff today.   Discussions with Specialists or Other Care Team Provider: CM    Education and Discussions with Family / Patient: Patient declined call to .     Current Length of Stay: 2 day(s)  Current Patient Status: Inpatient   Certification Statement: The patient will continue to " require additional inpatient hospital stay due to alcohol withdrawal, weakness and electrolyte abnormality   Discharge Plan: Anticipate discharge tomorrow to home.    Code Status: Level 1 - Full Code    Subjective     Patient was seen and examined at bedside.  Overnight patient received a dose of IV Ativan 2 mg due to high CIWA score of 8 per OVN nurse.  At the time of examination patient was very frustrated and wanted to go home as he noted he has many things to do at home such as laundry and cleaning.  Patient was subsequently discharged after medically optimized.  Stated that he is not ready to go home few hours later due to weakness therefore discharge has been canceled. CIWA at the time of evaluation was 4 and patient did not appear to be having withdrawal symptoms.     Objective :  Temp:  [98.1 °F (36.7 °C)-98.3 °F (36.8 °C)] 98.3 °F (36.8 °C)  HR:  [] 82  BP: ()/() 103/61  Resp:  [18-20] 18  SpO2:  [95 %-99 %] 96 %    Body mass index is 23.95 kg/m².     Input and Output Summary (last 24 hours):     Intake/Output Summary (Last 24 hours) at 4/7/2025 1503  Last data filed at 4/7/2025 0201  Gross per 24 hour   Intake --   Output 1200 ml   Net -1200 ml       Physical Exam  Vitals and nursing note reviewed.   Constitutional:       General: He is not in acute distress.     Appearance: He is well-developed. He is not diaphoretic.   HENT:      Head: Normocephalic and atraumatic.   Eyes:      Conjunctiva/sclera: Conjunctivae normal.   Cardiovascular:      Rate and Rhythm: Normal rate and regular rhythm.      Heart sounds: No murmur heard.  Pulmonary:      Effort: Pulmonary effort is normal. No respiratory distress.      Breath sounds: Normal breath sounds.   Abdominal:      Palpations: Abdomen is soft.      Tenderness: There is no abdominal tenderness.   Musculoskeletal:         General: No swelling.      Cervical back: Neck supple.      Right lower leg: No edema.      Left lower leg: No edema.    Skin:     General: Skin is warm.      Capillary Refill: Capillary refill takes less than 2 seconds.      Comments: Multiple bruises in different stages of healing on BL UE. No open wounds or swelling    Neurological:      General: No focal deficit present.      Mental Status: He is alert. Mental status is at baseline.      Comments: Unsteady gait at baseline. Uses walker to ambulate    Psychiatric:         Mood and Affect: Mood normal.           Lines/Drains:          Lab Results: I have reviewed the following results:   Results from last 7 days   Lab Units 04/07/25  0509   WBC Thousand/uL 5.16   HEMOGLOBIN g/dL 9.3*   HEMATOCRIT % 27.6*   PLATELETS Thousands/uL 42*   SEGS PCT % 64   LYMPHO PCT % 18   MONO PCT % 16*   EOS PCT % 1     Results from last 7 days   Lab Units 04/07/25  0509 04/06/25  0025 04/05/25  1911   SODIUM mmol/L 129*   < > 131*   POTASSIUM mmol/L 3.7   < > 4.8   CHLORIDE mmol/L 94*   < > 87*   CO2 mmol/L 23   < > 12*   BUN mg/dL 29*   < > 41*   CREATININE mg/dL 1.18   < > 1.87*   ANION GAP mmol/L 12   < > 32*   CALCIUM mg/dL 7.9*   < > 8.6   ALBUMIN g/dL  --   --  3.9   TOTAL BILIRUBIN mg/dL  --   --  1.76*   ALK PHOS U/L  --   --  130*   ALT U/L  --   --  41   AST U/L  --   --  75*   GLUCOSE RANDOM mg/dL 165*   < > 70    < > = values in this interval not displayed.                 Results from last 7 days   Lab Units 04/05/25  2141 04/05/25  1914   LACTIC ACID mmol/L 1.1 4.3*       Recent Cultures (last 7 days):               Last 24 Hours Medication List:     Current Facility-Administered Medications:     albuterol (PROVENTIL HFA,VENTOLIN HFA) inhaler 2 puff, Q4H PRN    aluminum-magnesium hydroxide-simethicone (MAALOX) oral suspension 30 mL, Q4H PRN    apixaban (ELIQUIS) tablet 5 mg, BID    aspirin chewable tablet 81 mg, Daily    diltiazem (CARDIZEM SR) 12 hr capsule 120 mg, Q12H SEDA    ferrous sulfate tablet 325 mg, Daily With Breakfast    fluticasone-vilanterol 200-25 mcg/actuation 1 puff,  Daily    folic acid (FOLVITE) tablet 1,000 mcg, Daily    lidocaine (LIDODERM) 5 % patch 1 patch, Daily    [Held by provider] magnesium Oxide (MAG-OX) tablet 800 mg, BID    metoprolol tartrate (LOPRESSOR) tablet 150 mg, Q12H SEDA    nicotine (NICODERM CQ) 21 mg/24 hr TD 24 hr patch 1 patch, Daily    pantoprazole (PROTONIX) EC tablet 40 mg, BID    ranolazine (RANEXA) 12 hr tablet 500 mg, Q12H SEDA    senna-docusate sodium (SENOKOT S) 8.6-50 mg per tablet 1 tablet, Daily PRN    tamsulosin (FLOMAX) capsule 0.4 mg, Daily    thiamine tablet 100 mg, Daily    trimethobenzamide (TIGAN) IM injection 200 mg, Q8H PRN    Administrative Statements   Today, Patient Was Seen By: Rich Finn MD      **Please Note: This note may have been constructed using a voice recognition system.**

## 2025-04-07 NOTE — ASSESSMENT & PLAN NOTE
Chronic hyponatremia likely secondary to alcohol use. POA .     Fluid restriction up to 1500 ml /day   Ensure 1 can BID daily to help with salt replacement as patient does not like taking salt tabs   Trend BMP

## 2025-04-07 NOTE — UTILIZATION REVIEW
Initial Clinical Review    Admission: Date/Time/Statement:   Admission Orders (From admission, onward)       Ordered        04/05/25 2109  INPATIENT ADMISSION  Once                          Orders Placed This Encounter   Procedures    INPATIENT ADMISSION     Standing Status:   Standing     Number of Occurrences:   1     Level of Care:   Level 2 Stepdown / HOT [14]     Estimated length of stay:   More than 2 Midnights     Certification:   I certify that inpatient services are medically necessary for this patient for a duration of greater than two midnights. See H&P and MD Progress Notes for additional information about the patient's course of treatment.     ED Arrival Information       Expected   -    Arrival   4/5/2025 18:59    Acuity   Emergent              Means of arrival   Ambulance    Escorted by   [facility]    Admission type   Emergency              Arrival complaint   Withdrawal             Chief Complaint   Patient presents with    Fall     Pt. Arrives via Ems for a fall that happened while on the toilet. Patient reports he stopped drinking 2 days ago has tremors and is diaphoretic.        Initial Presentation: 64 y.o. male presents to the ED via EMS from home with c/o fall while on the toilet and could not get back up. Last ETOH was 4/3.  Drinks bottle vodka daily, no oral intake x 2 days w/ + tremors, diaphoresis.  PMH: alcohol abuse, CAD, A-fib on AC, DM, HTN, COPD, GERD, nicotine dependence, hyponatremia.  In the ED he had hypotension 69/49, no c/o pain.  Treated with IV fluidsx 3L, IV Valium, IV Mag, PO PPI.  Labs - elevated troponin, lactic acid, CK, anion gap, BUN/Cr, alk phos, T bili, abnormal UA, low NA, Mag.  ECG - A fib w/ RVR.  Imaging - no acute disease/injury. On exam diaphoretic, tachycardic, A&O x 3.  Admitted to INPATIENT status with Alcohol Withdrawal, ENMA on CKD, SIRs, fall, electrolyte abnormality -NA and Mag, metabolic acidosis, A fib w/ RVR - PLAN: CIWA -  initial 7, seizure prec, IV Thiamine, folic acid, MVI, IV fluids, fall prec, replete and trend lytes, hold home Gabapentin, monitor off antibiotics, home inhalers, continue ASA, AC, Ranolazine, SSI cover.    Anticipated Length of Stay/Certification Statement: Patient will be admitted on an inpatient basis with an anticipated length of stay of greater than 2 midnights secondary to alcohol withdrawal.     Date: 4/6   Day 2:   Alcohol Withdrawal, ENMA on CKD, SIRs, fall, electrolyte abnormality -NA and Mag, metabolic acidosis, A fib w/ RVR - pt remains tachycardic. CIWA 10, 14, 3.  VS improving. IM Tigan x 1.    Continues on IV fluids, holding Gabapentin. Lactic acidosis resolved.  Pt notes he is hungry. No c/o tremors, palpitations, hallucination. On exam has multiple bruises on extremeties in various stages of healing  Alert.  Remains on tele.      Date: 4/7  Day 3: Has surpassed a 2nd midnight with active treatments and services.  Alcohol Withdrawal, ENMA on CKD, SIRs, fall, electrolyte abnormality -NA and Mag, metabolic acidosis, A fib w/ RVR - IV Mag x 1 today. On PO thiamine. IV fluids and tele d/c today.  CIWA 5.  , low platelets, Creat WNL, BUN downtrending.  VS stable, iHR stable.  Pt is cleared for d/c today.      ED Treatment-Medication Administration from 04/05/2025 1859 to 04/05/2025 2348         Date/Time Order Dose Route Action     04/05/2025 1913 sodium chloride 0.9 % bolus 1,000 mL 1,000 mL Intravenous New Bag     04/05/2025 1943 diazepam (VALIUM) injection 5 mg 5 mg Intravenous Given     04/05/2025 1959 magnesium sulfate 2 g/50 mL IVPB (premix) 2 g 2 g Intravenous New Bag     04/05/2025 2000 sodium chloride 0.9 % bolus 1,000 mL 1,000 mL Intravenous New Bag     04/05/2025 2033 sodium chloride 0.9 % bolus 1,000 mL 1,000 mL Intravenous New Bag     04/05/2025 2327 pantoprazole (PROTONIX) EC tablet 40 mg 40 mg Oral Given     04/05/2025 2328 lactated ringers infusion 75 mL/hr Intravenous New Bag             Scheduled Medications:  apixaban, 5 mg, Oral, BID  aspirin, 81 mg, Oral, Daily  diltiazem, 120 mg, Oral, Q12H SEDA  ferrous sulfate, 325 mg, Oral, Daily With Breakfast  fluticasone-vilanterol, 1 puff, Inhalation, Daily  folic acid, 1,000 mcg, Oral, Daily  lidocaine, 1 patch, Topical, Daily  [Held by provider] magnesium Oxide, 800 mg, Oral, BID  magnesium sulfate, 4 g, Intravenous, Once  metoprolol tartrate, 150 mg, Oral, Q12H SEDA  nicotine, 1 patch, Transdermal, Daily  pantoprazole, 40 mg, Oral, BID  ranolazine, 500 mg, Oral, Q12H SEDA  tamsulosin, 0.4 mg, Oral, Daily  thiamine, 100 mg, Oral, Daily  Thiamine 100 mg IV daily  - dc 4/6    Continuous IV Infusions:    IV LR @ 75 cc/hr - d/c 4/7     PRN Meds:  albuterol, 2 puff, Inhalation, Q4H PRN  aluminum-magnesium hydroxide-simethicone, 30 mL, Oral, Q4H PRN  senna-docusate sodium, 1 tablet, Oral, Daily PRN  trimethobenzamide, 200 mg, Intramuscular, Q8H PRN - x 1 4/6      ED Triage Vitals   Temperature Pulse Respirations Blood Pressure SpO2 Pain Score   04/05/25 1909 04/05/25 1909 04/05/25 1909 04/05/25 1909 04/05/25 1909 04/06/25 0038   98.2 °F (36.8 °C) 96 18 (!) 69/49 94 % No Pain     Weight (last 2 days)       Date/Time Weight    04/06/25 0038 84.6 (186.51)            Vital Signs (last 3 days)       Date/Time Temp Pulse Resp BP MAP (mmHg) SpO2 O2 Device Patient Position - Orthostatic VS CIWA-Ar Total Pain    04/07/25 07:26:30 -- 82 -- 103/61 75 -- -- -- 5 --    04/07/25 0330 -- 69 -- 102/56 -- -- -- -- 5 --    04/07/25 02:44:34 98.3 °F (36.8 °C) 71 18 96/53 67 96 % -- -- -- --    04/07/25 01:29:18 -- 68 -- 91/55 67 99 % -- -- -- --    04/07/25 00:12:18 -- 68 -- 91/53 66 95 % -- -- -- --    04/07/25 00:10:32 -- 73 -- 89/49 62 97 % -- -- -- --    04/06/25 2330 -- 60 -- -- -- -- -- -- 5 --    04/06/25 2230 -- 130 -- 125/78 -- -- -- -- 9 --    04/06/25 22:19:05 -- -- -- 125/78 94 -- -- -- -- --    04/06/25 2100 -- -- -- -- -- -- -- -- -- No Pain    04/06/25  20:04:23 -- -- -- 139/80 100 -- -- -- -- --    04/06/25 18:55:44 98.1 °F (36.7 °C) 111 19 153/102 119 95 % -- -- -- --    04/06/25 1830 -- -- -- -- -- -- -- -- 6 --    04/06/25 18:10:58 -- 115 -- 149/94 112 96 % -- -- -- --    04/06/25 15:28:03 -- 94 20 114/70 85 95 % -- -- -- --    04/06/25 1430 -- -- -- -- -- -- -- -- 6 --    04/06/25 13:29:49 -- 113 -- 105/68 80 94 % -- -- -- --    04/06/25 1326 -- -- -- -- -- -- -- -- 14 --    04/06/25 09:31:52 98.1 °F (36.7 °C) 110 16 104/66 79 97 % -- -- 6 --    04/06/25 0800 -- -- -- -- -- -- -- -- -- No Pain    04/06/25 0538 -- 109 -- 110/73 -- -- -- -- 6 --    04/06/25 03:07:12 -- 98 -- 108/67 81 94 % -- -- -- --    04/06/25 01:29:05 -- 94 -- 106/67 80 96 % -- -- 3 --    04/06/25 0038 -- -- -- -- -- -- -- -- -- No Pain    04/06/25 0000 -- 89 -- -- -- -- -- -- 10 --    04/05/25 23:52:31 98.2 °F (36.8 °C) -- 21 102/68 79 -- -- -- -- --    04/05/25 2330 -- 101 22 104/55 71 96 % None (Room air) Lying -- --    04/05/25 2315 -- 96 20 103/55 78 96 % None (Room air) Lying 6 --    04/05/25 2300 -- 106 20 107/56 78 95 % None (Room air) Lying -- --    04/05/25 2230 -- 93 20 110/65 81 95 % None (Room air) Lying -- --    04/05/25 2222 -- -- -- -- -- 90 % None (Room air) -- -- --    04/05/25 2215 -- 96 20 95/50 70 98 % None (Room air) Lying 7 --    04/05/25 2200 -- 95 20 114/56 78 98 % None (Room air) Lying -- --    04/05/25 2115 -- 95 -- 107/55 -- -- -- -- 7 --    04/05/25 2100 -- 95 20 107/55 79 100 % None (Room air) Lying -- --    04/05/25 2046 -- -- -- -- -- -- None (Room air) -- -- --    04/05/25 2045 -- 89 20 99/50 72 100 % None (Room air) Lying -- --    04/05/25 2030 -- 93 -- 99/56 72 100 % -- -- -- --    04/05/25 2024 -- 95 -- 92/48 67 99 % -- -- -- --    04/05/25 2015 -- 97 -- 88/53 68 100 % -- -- 7 --    04/05/25 2000 -- 110 25 99/53 72 98 % None (Room air) Lying -- --    04/05/25 1930 -- 112 26 114/51 73 96 % None (Room air) Lying -- --    04/05/25 1915 -- 118 23 81/49 60 95 %  -- -- 7 --    04/05/25 1909 98.2 °F (36.8 °C) 96 18 69/49 -- 94 % None (Room air) Lying -- --           CIWA-Ar Score       [facility] Name 04/07/25 07:26:30 04/07/25 0330 04/06/25 2330       CIWA-Ar    BP -- 102/56 --    Pulse 82 69 60    Nausea and Vomiting 0 1 1    Tactile Disturbances 0 0 0    Tremor 3 3 3    Auditory Disturbances 0 0 0    Paroxysmal Sweats 0 0 0    Visual Disturbances 0 0 0    Anxiety 2 1 1    Headache, Fullness in Head 0 0 0    Agitation 0 0 0    Orientation and Clouding of Sensorium 0 0 0    CIWA-Ar Total 5 5 5      [facility] Name 04/06/25 2230 04/06/25 1830 04/06/25 1430       CIWA-Ar    /78 -- --    Pulse 130 -- --    Nausea and Vomiting 1 0 1    Tactile Disturbances 1 0 0    Tremor 3 3 2    Auditory Disturbances 0 0 0    Paroxysmal Sweats 1 0 0    Visual Disturbances 0 0 0    Anxiety 1 1 1    Headache, Fullness in Head 1 1 1    Agitation 1 0 0    Orientation and Clouding of Sensorium 0 1 1    CIWA-Ar Total 9 6 6      [facility] Name 04/06/25 1326 04/06/25 09:31:52 04/06/25 0538       CIWA-Ar    BP -- -- 110/73    Pulse -- -- 109    Nausea and Vomiting 2 0 2    Tactile Disturbances 0 0 0    Tremor 4 4 2    Auditory Disturbances 0 1 0    Paroxysmal Sweats 1 0 0    Visual Disturbances 0 0 0    Anxiety 3 0 1    Headache, Fullness in Head 3 0 0    Agitation 0 1 1    Orientation and Clouding of Sensorium 1 0 0    CIWA-Ar Total 14 6 6      Row Name 04/06/25 01:29:05 04/06/25 0000 04/05/25 2315       CIWA-Ar    Pulse -- 89 --    Nausea and Vomiting 0 0 0    Tactile Disturbances 0 0 0    Tremor 2 4 3    Auditory Disturbances 0 0 0    Paroxysmal Sweats 0 1 0    Visual Disturbances 0 0 0    Anxiety 1 3 3    Headache, Fullness in Head 0 0 0    Agitation 0 2 0    Orientation and Clouding of Sensorium 0 0 0    CIWA-Ar Total 3 10 6      [facility] Name 04/05/25 2215 04/05/25 2115 04/05/25 2015       CIWA-Ar    BP -- 107/55 --    Pulse -- 95 --    Nausea and Vomiting 0 0 0    Tactile Disturbances 0 0 0    Tremor 3 3 2     Auditory Disturbances 0 0 0    Paroxysmal Sweats 1 1 2    Visual Disturbances 0 0 0    Anxiety 3 3 1    Headache, Fullness in Head 0 0 0    Agitation 0 0 2    Orientation and Clouding of Sensorium 0 0 0    CIWA-Ar Total 7 7 7      Row Name 04/05/25 1915             CIWA-Ar    Nausea and Vomiting 0      Tactile Disturbances 0      Tremor 2      Auditory Disturbances 0      Paroxysmal Sweats 2      Visual Disturbances 0      Anxiety 1      Headache, Fullness in Head 0      Agitation 2      Orientation and Clouding of Sensorium 0      CIWA-Ar Total 7                      Pertinent Labs/Diagnostic Test Results:   Radiology:  XR chest 1 view portable   Final Interpretation by Heidy Welch MD (04/06 0752)      No acute cardiopulmonary disease.            Workstation performed: [id number]         CT cervical spine without contrast   Final Interpretation by Rikki Hines MD (04/05 2043)      No cervical spine fracture or traumatic malalignment.                  Workstation performed: [id number]         CT head without contrast   Final Interpretation by Rikki Hines MD (04/05 2042)      No acute intracranial abnormality.                  Workstation performed: [id number]           Cardiology:  ECG 12 lead    by Interface, Ris Results In (04/05 1913)        GI:  No orders to display           Results from last 7 days   Lab Units 04/07/25  0509 04/06/25  0501 04/05/25 1911   WBC Thousand/uL 5.16 5.30 5.76   HEMOGLOBIN g/dL 9.3* 9.5* 11.5*   HEMATOCRIT % 27.6* 28.3* 35.0*   PLATELETS Thousands/uL 42* 40* 55*   TOTAL NEUT ABS Thousands/µL 3.32 3.16 4.11         Results from last 7 days   Lab Units 04/07/25  0509 04/06/25  0501 04/06/25  0025 04/05/25 1911   SODIUM mmol/L 129* 131* 131* 131*   POTASSIUM mmol/L 3.7 4.1 4.6 4.8   CHLORIDE mmol/L 94* 95* 94* 87*   CO2 mmol/L 23 18* 14* 12*   ANION GAP mmol/L 12 18* 23* 32*   BUN mg/dL 29* 42* 39* 41*   CREATININE mg/dL 1.18 1.64* 1.59* 1.87*   EGFR ml/min/1.73sq m  65 44 45 37   CALCIUM mg/dL 7.9* 7.3* 7.5* 8.6   MAGNESIUM mg/dL 1.4* 1.4*  --  1.0*     Results from last 7 days   Lab Units 04/05/25 1911   AST U/L 75*   ALT U/L 41   ALK PHOS U/L 130*   TOTAL PROTEIN g/dL 7.3   ALBUMIN g/dL 3.9   TOTAL BILIRUBIN mg/dL 1.76*         Results from last 7 days   Lab Units 04/07/25  0509 04/06/25  0501 04/06/25  0025 04/05/25 1911   GLUCOSE RANDOM mg/dL 165* 102 54* 70             BETA-HYDROXYBUTYRATE   Date Value Ref Range Status   01/22/2024 3.2 (H) <0.6 mmol/L Final          Results from last 7 days   Lab Units 04/06/25  0501   CK TOTAL U/L 785*     Results from last 7 days   Lab Units 04/05/25 2141 04/05/25 1911   HS TNI 0HR ng/L  --  54*   HS TNI 2HR ng/L 44  --    HSTNI D2 ng/L -10  --                Results from last 7 days   Lab Units 04/05/25 2141 04/05/25 1914   LACTIC ACID mmol/L 1.1 4.3*           Results from last 7 days   Lab Units 04/05/25 1911   LIPASE u/L 74         Results from last 7 days   Lab Units 04/06/25  0250   CLARITY UA  Clear   COLOR UA  Yellow   SPEC GRAV UA  1.025   PH UA  6.0   GLUCOSE UA mg/dl Negative   KETONES UA mg/dl 40 (2+)*   BLOOD UA  Moderate*   PROTEIN UA mg/dl 100 (2+)*   NITRITE UA  Negative   BILIRUBIN UA  Small*   UROBILINOGEN UA (BE) mg/dl 2.0*   LEUKOCYTES UA  Negative   WBC UA /hpf 1-2   RBC UA /hpf 0-1   BACTERIA UA /hpf Occasional   EPITHELIAL CELLS WET PREP /hpf Occasional   MUCUS THREADS  Occasional*             Results from last 7 days   Lab Units 04/06/25  0250   AMPH/METH  Negative   BARBITURATE UR  Negative   BENZODIAZEPINE UR  Negative   COCAINE UR  Negative   METHADONE URINE  Negative   OPIATE UR  Negative   PCP UR  Negative   THC UR  Negative     Results from last 7 days   Lab Units 04/05/25 1911   ETHANOL LVL mg/dL <10             Past Medical History:   Diagnosis Date    Acute on chronic kidney failure  (HCC)     Alcohol abuse     Alcohol withdrawal (HCC) 06/07/2019    Atrial fibrillation (HCC)     Cancer (HCC)      prostate ca,had radiation    Cardiac disease     stents,then triple bypass    COPD (chronic obstructive pulmonary disease) (HCC)     Coronary artery disease     ETOH abuse     Heart failure (HCC)     History of heart surgery     says triple bypass [facility]    Hx of heart artery stent     2014    Hyperlipidemia     Hypertension     Hyponatremia 10/17/2019    Hypovolemic shock (HCC) 12/22/2019    Lumbar spondylitis (HCC) 10/13/2022    Metabolic acidosis, increased anion gap 04/21/2021    Nasal bone fracture 10/10/2022    Prostate CA (HCC)     S/P CABG x 3     2004    Sleep apnea      Present on Admission:   CAD (coronary artery disease)   Nicotine abuse   COPD, severity to be determined (HCC)   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 disease (HCC)   Hypomagnesemia   Electrolyte abnormality   Alcohol withdrawal (HCC)   Acute kidney injury superimposed on CKD  (HCC)   SIRS (systemic inflammatory response syndrome) (HCC)   Metabolic acidosis, increased anion gap   Hyponatremia   Fall      Admitting Diagnosis: Alcohol withdrawal (HCC) [F10.939]  Hypomagnesemia [E83.42]  Back pain [M54.9]  Hypotension [I95.9]  ENMA (acute kidney injury) (HCC) [N17.9]  Age/Sex: 64 y.o. male    Network Utilization Review Department  ATTENTION: Please call with any questions or concerns to [phone] and carefully listen to the prompts so that you are directed to the right person. All voicemails are confidential.   For Discharge needs, contact Care Management DC Support Team at [phone] opt. [phone]  Send all requests for admission clinical reviews, approved or denied determinations and any other requests to dedicated fax number below belonging to the campus where the patient is receiving treatment. List of dedicated fax numbers for the Facilities:  FACILITY NAME UR FAX NUMBER   ADMISSION DENIALS (Administrative/Medical Necessity) [phone]   DISCHARGE SUPPORT TEAM (NETWORK) [phone]   PARENT CHILD HEALTH  (Maternity/NICU/Pediatrics) [phone]   [facility] [phone]   [facility] [phone]   [facility] [phone]   [facility] [phone]   [facility] [phone]   General acute hospital [phone]   [facility] [phone]   [facility] [phone]   [facility] [phone]   [facility] [phone]   [facility] [phone]   [facility] [phone]

## 2025-04-07 NOTE — HOSPITAL COURSE
64 year old male with a PMH of alcohol abuse, CAD, A-fib on anticoagulation, diabetes mellitus type 2, hypertension, COPD, GERD, nicotine dependence, hyponatremia  who presented to the ED after a fall at home. He states that he fell while on the toilet and was unable to get back up prompting EMS transport to the hospital. Patient states that his last alcoholic drink was 2 days ago.  He has been drinking approximately 1 bottle of vodka daily for the past few months. Admitted for alcohol withdrawal, ENMA and Atrial fibrillation. Patient was hydrated with IV fluids, CIWA protocol was initiated and patient was also continued on thiamine, folic acids and multivitamins. ENMA improved. Patient stayed hemodynamically stable and his Cr returned to baseline. For atrial fibrillation, telemetry was monitored and patient was given lopressor, digoxin as needed. Patient's Sodium dropped to 127 on 04/07. He was given 250 cc bolus that increased the sodium to 128, and later 129. Patient continues to deny salt tablets. He was discharged in stable condition with recommendation to follow up with repeat BMP in 1 week. Details in AVS.

## 2025-04-07 NOTE — DISCHARGE SUMMARY
Discharge Summary - Internal Medicine   Name: Gregg Partida 64 y.o. male I MRN: [id number]  Unit/Bed#: 4 [facility]-01 I Date of Admission: 4/5/2025   Date of Service: 4/7/2025 I Hospital Day: 2  { ?Quick Links I Problem List I PORCH I Billing Tip:01896}  Medical Problems       Resolved Problems  Date Reviewed: 12/9/2024   None       Discharging Physician / Practitioner: Rich Finn MD  PCP: Lilliana Bliss MD  Admission Date:   Admission Orders (From admission, onward)       Ordered        04/05/25 2109  INPATIENT ADMISSION  Once                          Discharge Date: 04/07/25    Consultations During Hospital Stay:  ***    Procedures Performed:   ***    Significant Findings / Test Results:   ***    Incidental Findings:   ***   {SLIM Discharge Incidential Findings:46859}    Test Results Pending at Discharge (will require follow up):   ***     Outpatient Tests Requested:  ***    Complications:  ***    Reason for Admission: ***    Hospital Course:   Gregg Partida is a 64 y.o. male patient who originally presented to the hospital on 4/5/2025 due to ***    {Hospital Course:61968}    {Complete this smartphrase if the patient is an inpatient and being discharged earlier than 2 midnights. If this does not apply, please delete this line:31049}  Please see above list of diagnoses and related plan for additional information.     Condition at Discharge: {Condition:68842}    Discharge Day Visit / Exam:   {SL IP SLIM DISCHARGE EXAM OPTIONS:14730}    Discussion with Family: {Family Communication:43163}    Discharge instructions/Information to patient and family:   See after visit summary for information provided to patient and family.      Provisions for Follow-Up Care:  See after visit summary for information related to follow-up care and any pertinent home health orders.      Mobility at time of Discharge:   Basic Mobility Inpatient Raw Score: 15  -HLM Goal: 4: Move to chair/commode  JH-HLM Achieved: 4: Move to  chair/commode  {Mobility:33310}     Disposition:   {Disposition on Discharge:77729}    Planned Readmission: ***    Discharge Medications:  See after visit summary for reconciled discharge medications provided to patient and/or family.      Administrative Statements   Discharge Statement:  I have spent a total time of *** minutes in caring for this patient on the day of the visit/encounter. {>30 minutes of time was spent on:86681}.    **Please Note: This note may have been constructed using a voice recognition system**

## 2025-04-07 NOTE — ASSESSMENT & PLAN NOTE
Lab Results   Component Value Date    EGFR 65 04/07/2025    EGFR 44 04/06/2025    EGFR 45 04/06/2025    CREATININE 1.18 04/07/2025    CREATININE 1.64 (H) 04/06/2025    CREATININE 1.59 (H) 04/06/2025     On admission creatinine 1.87. Baseline around 1.1-1.2.   Likely Prerenal ENMA in the setting of poor p.o. intake and dehydration    ED Course: 3 L NS bolus, 2g IV Mg SO4  CT head: No acute intracranial hemorrhage, significant mass effect or midline shift.   CT spine: No acute cervical spine fracture or traumatic malalignment.      Plan:   Cr back to baseline after being treated with 75 mL/hr IV NS (PMH of diastolic HF w/ latest LVEF of 65%), discontinued IVF  Replete electrolytes as needed   Resume home gabapentin   Monitor CMP  Avoid nephrotoxic agents

## 2025-04-07 NOTE — ASSESSMENT & PLAN NOTE
Chronic. Stable   Home regimen -  5 mg Eliquis BID, 120 mg Cardizem SR Q12, and 200 mg Lopressor Q12.  Noted to have elevated HR upto 170s during hospital course     4/5/2025 EKG Afib    Plan  Received Digoxin x1 (4/6) due to low BP, unable to tolerate lopressor. Required IV lopressor 5 mg overnight (4/6)  Continue with home Lopressor and Cardizem with holding parameters  Continue home med Eliquis 5 mg BID  Monitor telemetry

## 2025-04-07 NOTE — ASSESSMENT & PLAN NOTE
POA due to fall, unknown down time a/e/b multiple bruises over body, elevated total CK after IVF boluses  Total     Treated with 3 liters IVF bolus, IV hydration  Continue PO intake

## 2025-04-08 PROBLEM — F10.939 ALCOHOL WITHDRAWAL (HCC): Status: RESOLVED | Noted: 2019-10-17 | Resolved: 2025-04-08

## 2025-04-08 PROBLEM — E83.42 HYPOMAGNESEMIA: Status: RESOLVED | Noted: 2018-10-10 | Resolved: 2025-04-08

## 2025-04-08 PROBLEM — R65.10 SIRS (SYSTEMIC INFLAMMATORY RESPONSE SYNDROME) (HCC): Status: RESOLVED | Noted: 2019-11-23 | Resolved: 2025-04-08

## 2025-04-08 LAB
ANION GAP SERPL CALCULATED.3IONS-SCNC: 10 MMOL/L (ref 4–13)
ANION GAP SERPL CALCULATED.3IONS-SCNC: 12 MMOL/L (ref 4–13)
BUN SERPL-MCNC: 21 MG/DL (ref 5–25)
BUN SERPL-MCNC: 25 MG/DL (ref 5–25)
CALCIUM SERPL-MCNC: 8.2 MG/DL (ref 8.4–10.2)
CALCIUM SERPL-MCNC: 8.4 MG/DL (ref 8.4–10.2)
CHLORIDE SERPL-SCNC: 92 MMOL/L (ref 96–108)
CHLORIDE SERPL-SCNC: 93 MMOL/L (ref 96–108)
CO2 SERPL-SCNC: 23 MMOL/L (ref 21–32)
CO2 SERPL-SCNC: 25 MMOL/L (ref 21–32)
CREAT SERPL-MCNC: 0.97 MG/DL (ref 0.6–1.3)
CREAT SERPL-MCNC: 0.98 MG/DL (ref 0.6–1.3)
GFR SERPL CREATININE-BSD FRML MDRD: 82 ML/MIN/1.73SQ M
GFR SERPL CREATININE-BSD FRML MDRD: 83 ML/MIN/1.73SQ M
GLUCOSE SERPL-MCNC: 128 MG/DL (ref 65–140)
GLUCOSE SERPL-MCNC: 145 MG/DL (ref 65–140)
MAGNESIUM SERPL-MCNC: 2.2 MG/DL (ref 1.9–2.7)
POTASSIUM SERPL-SCNC: 3.6 MMOL/L (ref 3.5–5.3)
POTASSIUM SERPL-SCNC: 3.6 MMOL/L (ref 3.5–5.3)
SODIUM SERPL-SCNC: 127 MMOL/L (ref 135–147)
SODIUM SERPL-SCNC: 128 MMOL/L (ref 135–147)

## 2025-04-08 PROCEDURE — 80048 BASIC METABOLIC PNL TOTAL CA: CPT

## 2025-04-08 PROCEDURE — 99232 SBSQ HOSP IP/OBS MODERATE 35: CPT | Performed by: INTERNAL MEDICINE

## 2025-04-08 PROCEDURE — 83735 ASSAY OF MAGNESIUM: CPT

## 2025-04-08 RX ORDER — SODIUM CHLORIDE 9 MG/ML
100 INJECTION, SOLUTION INTRAVENOUS CONTINUOUS
Status: DISCONTINUED | OUTPATIENT
Start: 2025-04-08 | End: 2025-04-08

## 2025-04-08 RX ADMIN — THIAMINE HCL TAB 100 MG 100 MG: 100 TAB at 09:06

## 2025-04-08 RX ADMIN — RANOLAZINE 500 MG: 500 TABLET, FILM COATED, EXTENDED RELEASE ORAL at 20:54

## 2025-04-08 RX ADMIN — SODIUM CHLORIDE 250 ML: 0.9 INJECTION, SOLUTION INTRAVENOUS at 09:08

## 2025-04-08 RX ADMIN — METOPROLOL TARTRATE 150 MG: 100 TABLET, FILM COATED ORAL at 20:54

## 2025-04-08 RX ADMIN — FOLIC ACID 1000 MCG: 1 TABLET ORAL at 09:06

## 2025-04-08 RX ADMIN — FERROUS SULFATE TAB 325 MG (65 MG ELEMENTAL FE) 325 MG: 325 (65 FE) TAB at 07:38

## 2025-04-08 RX ADMIN — APIXABAN 5 MG: 5 TABLET, FILM COATED ORAL at 17:36

## 2025-04-08 RX ADMIN — FLUTICASONE FUROATE AND VILANTEROL TRIFENATATE 1 PUFF: 200; 25 POWDER RESPIRATORY (INHALATION) at 09:08

## 2025-04-08 RX ADMIN — DILTIAZEM HYDROCHLORIDE 120 MG: 60 CAPSULE, EXTENDED RELEASE ORAL at 20:54

## 2025-04-08 RX ADMIN — RANOLAZINE 500 MG: 500 TABLET, FILM COATED, EXTENDED RELEASE ORAL at 09:06

## 2025-04-08 RX ADMIN — DILTIAZEM HYDROCHLORIDE 120 MG: 60 CAPSULE, EXTENDED RELEASE ORAL at 09:08

## 2025-04-08 RX ADMIN — ASPIRIN 81 MG: 81 TABLET, CHEWABLE ORAL at 09:06

## 2025-04-08 RX ADMIN — SODIUM CHLORIDE 100 ML/HR: 0.9 INJECTION, SOLUTION INTRAVENOUS at 07:38

## 2025-04-08 RX ADMIN — TAMSULOSIN HYDROCHLORIDE 0.4 MG: 0.4 CAPSULE ORAL at 09:06

## 2025-04-08 RX ADMIN — PANTOPRAZOLE SODIUM 40 MG: 40 TABLET, DELAYED RELEASE ORAL at 20:54

## 2025-04-08 RX ADMIN — APIXABAN 5 MG: 5 TABLET, FILM COATED ORAL at 09:07

## 2025-04-08 RX ADMIN — METOPROLOL TARTRATE 150 MG: 100 TABLET, FILM COATED ORAL at 09:06

## 2025-04-08 RX ADMIN — PANTOPRAZOLE SODIUM 40 MG: 40 TABLET, DELAYED RELEASE ORAL at 09:06

## 2025-04-08 NOTE — ASSESSMENT & PLAN NOTE
Chronic hyponatremia likely secondary to alcohol use. POA .  Today 127     Normal saline IV for 6 hours, recheck at 1600  Fluid restriction up to 1500 ml /day   Ensure 1 can BID daily to help with salt replacement as patient does not like taking salt tabs   Trend BMP

## 2025-04-08 NOTE — CASE MANAGEMENT
Case Management Progress Note    Patient name Gregg Partida  Location 4 [place] [facility]/4 [place] [facility]-* MRN [id number]  : 1960 Date 2025       LOS (days): 3  Geometric Mean LOS (GMLOS) (days): 4.9  Days to GMLOS:2.1        OBJECTIVE:        Current admission status: Inpatient  Preferred Pharmacy:   [facility] [address] [address]  Phone: [phone] Fax: [phone]    [facility] [address] [place] [address] [address]  [address]  [facility] [address]  Phone: [phone] Fax: [phone]    Primary Care Provider: Lilliana Bliss MD    Primary Insurance: [employer] REP  Secondary Insurance:     PROGRESS NOTE:    Call placed to jeff Escobedo, voice mail left and call back requested.

## 2025-04-08 NOTE — PROGRESS NOTES
Progress Note - Internal Medicine   Name: Gregg Partida 64 y.o. male I MRN: [id number]  Unit/Bed#: 4 [address] I Date of Admission: 4/5/2025   Date of Service: 4/8/2025 I Hospital Day: 3    Assessment & Plan  Alcohol withdrawal (HCC)  Patient presented to the ER after a fall in the bathroom, he denies any head trauma. Last drink was 2 days ago, he states that he has been drinking a bottle of vodka for the last 3 months until 2 days ago.   Presentation, patient was dehydrated had tremors and sweating.  No signs of altered mental status    In the ER patient received Valium 5mg IV, Ativan 2 mg IV, LR 1000 ml bolus * 3  Received 3 doses of IV Ativan 2 mg on 4/6     Plan  Pt placed on CIWA protocol   Seizure precautions   Thiamine, folate and multivitamins   Discontinued IV LR 75 ml/hr   Fall precautions  Seizure precautions  Afib with RVR  Chronic. Stable   Home regimen -  5 mg Eliquis BID, 120 mg Cardizem SR Q12, and 200 mg Lopressor Q12.  Noted to have elevated HR upto 170s during hospital course     4/5/2025 EKG Afib    Plan  Received Digoxin x1 (4/6) due to low BP, unable to tolerate lopressor. Required IV lopressor 5 mg overnight (4/6)  Continue with home Lopressor and Cardizem with holding parameters  Continue home med Eliquis 5 mg BID  Monitor telemetry  Electrolyte abnormality  POA Mag 1.0.  In the ED patient received magnesium sulfate 2 g IV  Patient has chronic hypomagnesemia in the setting of alcohol use  Magnesium holding at 1.4 after IV Mag 4g. Patient had large loose BM per nurse.     Replete with IV mag   Continue with home magnesium oxide 800 mg twice daily  Monitor and Replete as required  SIRS (systemic inflammatory response syndrome) (HCC)  POA, tachycardia and tachypnea, less likely infectious origin. Evidence of  acute organ dysfunction (ENMA) due to dehydration, alcohol abuse and withdrawal in the setting of ENMA, thrombocytopenia, and elevate lactate.     Lactic acid improved from 4.3 to 1.1  "after IV fluid resuscitation  Patient is afebrile with no leukocytosis    Treated with IVF bolus and serial labs   Monitor vitals, unlikely to be an infectious source  Plan under alcohol withdrawal  Hyponatremia  Chronic hyponatremia likely secondary to alcohol use. POA .  Today 127     Normal saline IV for 6 hours, recheck at 1600  Fluid restriction up to 1500 ml /day   Ensure 1 can BID daily to help with salt replacement as patient does not like taking salt tabs   Trend BMP  Fall  Pt states that he fell while on the toilet and was unable to get back up prompting EMS transport to the hospital  CT head and CT spine did not show any fracture or trauma  Pt denies any pain currently    Fall precautions  COPD, severity to be determined (HCC)  Chronic stable.  Not in acute exacerbation.  Denies any shortness of breath    Continue home Albuterol inhaler, Breo Ellipta inhaler  CAD (coronary artery disease)  Patient has a h/o CAD s/p CABG. Patient is home on Aspirin 81 mg EC tablet daily, Ranolazine 500 mg Q12, and Eliquis 5 mg BID.     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    Blood Sugar Average: Last 72 hrs:    Chronic. Stable. Patient is home on 500mg Metformin tabs daily     Plan:   Held home Metformin   ISS w/ accu checks  ACHS  Hypoglycemia protocol   YOAN/CHO carb diet   Continue to monitor     History of prostate cancer  Chronic, stable, PMH of prostate CA (2020), s/p resection 2021    Continue home medication flomax 0.4 mg daily  Nicotine abuse  Chronic stable smoked 1.5 packs x day    Nicotine 21 mg / 24 hour patch    VTE Pharmacologic Prophylaxis: VTE Score: 8 High Risk (Score >/= 5) - Pharmacological DVT Prophylaxis Ordered: apixaban (Eliquis). Sequential Compression " Devices Ordered.    Mobility:   Basic Mobility Inpatient Raw Score: 15  JH-HLM Goal: 4: Move to chair/commode  JH-HLM Achieved: 4: Move to chair/commode  JH-HLM Goal achieved. Continue to encourage appropriate mobility.    Patient Centered Rounds: I performed bedside rounds with nursing staff today.   Discussions with Specialists or Other Care Team Provider: DESIREE    Education and Discussions with Family / Patient: Patient declined call to .     Current Length of Stay: 3 day(s)  Current Patient Status: Inpatient   Certification Statement: The patient will continue to require additional inpatient hospital stay due to alcohol withdrawal, weakness and electrolyte abnormality   Discharge Plan: Anticipate discharge tomorrow to home.    Code Status: Level 1 - Full Code    Subjective   Patient was seen and examined at bedside. Today reports he is ready to go home.  Overnight no acute events. Currently patient denies any fever, chills, shortness of breath, cough, chest pain, palpitations, light headedness, headaches, vision change, abdominal pain, N/V/D, urinary symptoms.  And claims that he needs to pay his rent and go to the bank    Objective :  Temp:  [98.2 °F (36.8 °C)-98.3 °F (36.8 °C)] 98.2 °F (36.8 °C)  HR:  [] 67  BP: (103-146)/() 107/59  Resp:  [18-20] 18  SpO2:  [94 %-97 %] 95 %    Body mass index is 23.95 kg/m².     Input and Output Summary (last 24 hours):     Intake/Output Summary (Last 24 hours) at 4/8/2025 0709  Last data filed at 4/8/2025 0610  Gross per 24 hour   Intake --   Output 1100 ml   Net -1100 ml       Physical Exam  Vitals and nursing note reviewed.   Constitutional:       General: He is not in acute distress.     Appearance: He is well-developed. He is not diaphoretic.   HENT:      Head: Normocephalic and atraumatic.   Eyes:      Conjunctiva/sclera: Conjunctivae normal.   Cardiovascular:      Rate and Rhythm: Normal rate and regular rhythm.      Heart sounds: No murmur  heard.  Pulmonary:      Effort: Pulmonary effort is normal. No respiratory distress.      Breath sounds: Normal breath sounds.   Abdominal:      Palpations: Abdomen is soft.      Tenderness: There is no abdominal tenderness.   Musculoskeletal:         General: No swelling.      Cervical back: Neck supple.      Right lower leg: No edema.      Left lower leg: No edema.   Skin:     General: Skin is warm.      Capillary Refill: Capillary refill takes less than 2 seconds.      Comments: Multiple bruises in different stages of healing on BL UE. No open wounds or swelling    Neurological:      General: No focal deficit present.      Mental Status: He is alert. Mental status is at baseline.      Comments: Unsteady gait at baseline. Uses walker to ambulate    Psychiatric:         Mood and Affect: Mood normal.           Lines/Drains:          Lab Results: I have reviewed the following results:   Results from last 7 days   Lab Units 04/07/25  0509   WBC Thousand/uL 5.16   HEMOGLOBIN g/dL 9.3*   HEMATOCRIT % 27.6*   PLATELETS Thousands/uL 42*   SEGS PCT % 64   LYMPHO PCT % 18   MONO PCT % 16*   EOS PCT % 1     Results from last 7 days   Lab Units 04/08/25  0450 04/06/25  0025 04/05/25  1911   SODIUM mmol/L 127*   < > 131*   POTASSIUM mmol/L 3.6   < > 4.8   CHLORIDE mmol/L 92*   < > 87*   CO2 mmol/L 23   < > 12*   BUN mg/dL 25   < > 41*   CREATININE mg/dL 0.97   < > 1.87*   ANION GAP mmol/L 12   < > 32*   CALCIUM mg/dL 8.4   < > 8.6   ALBUMIN g/dL  --   --  3.9   TOTAL BILIRUBIN mg/dL  --   --  1.76*   ALK PHOS U/L  --   --  130*   ALT U/L  --   --  41   AST U/L  --   --  75*   GLUCOSE RANDOM mg/dL 128   < > 70    < > = values in this interval not displayed.                 Results from last 7 days   Lab Units 04/05/25  2141 04/05/25  1914   LACTIC ACID mmol/L 1.1 4.3*       Recent Cultures (last 7 days):               Last 24 Hours Medication List:     Current Facility-Administered Medications:     albuterol (PROVENTIL  "HFA,VENTOLIN HFA) inhaler 2 puff, Q4H PRN    aluminum-magnesium hydroxide-simethicone (MAALOX) oral suspension 30 mL, Q4H PRN    apixaban (ELIQUIS) tablet 5 mg, BID    aspirin chewable tablet 81 mg, Daily    diltiazem (CARDIZEM SR) 12 hr capsule 120 mg, Q12H SEDA    ferrous sulfate tablet 325 mg, Daily With Breakfast    fluticasone-vilanterol 200-25 mcg/actuation 1 puff, Daily    folic acid (FOLVITE) tablet 1,000 mcg, Daily    lidocaine (LIDODERM) 5 % patch 1 patch, Daily    [Held by provider] magnesium Oxide (MAG-OX) tablet 800 mg, BID    metoprolol tartrate (LOPRESSOR) tablet 150 mg, Q12H SEDA    nicotine (NICODERM CQ) 21 mg/24 hr TD 24 hr patch 1 patch, Daily    pantoprazole (PROTONIX) EC tablet 40 mg, BID    ranolazine (RANEXA) 12 hr tablet 500 mg, Q12H SEDA    senna-docusate sodium (SENOKOT S) 8.6-50 mg per tablet 1 tablet, Daily PRN    sodium chloride 0.9 % infusion, Continuous    tamsulosin (FLOMAX) capsule 0.4 mg, Daily    thiamine tablet 100 mg, Daily    trimethobenzamide (TIGAN) IM injection 200 mg, Q8H PRN    Administrative Statements   Today, Patient Was Seen By: Doron Reyes MD      **Please Note: This note may have been constructed using a voice recognition system.**    Portions of the record may have been created with voice recognition software. Occasional wrong word or \"sound a like\" substitutions may have occurred due to the inherent limitations of voice recognition software. Read the chart carefully and recognize, using context, where substitutions have occurred.If you have any questions, please contact the dictating provider.       Doron Reyes MD  PGY2 Family Medicine Resident  [facility]          "

## 2025-04-08 NOTE — PLAN OF CARE

## 2025-04-08 NOTE — PLAN OF CARE
Problem: PAIN - ADULT  Goal: Verbalizes/displays adequate comfort level or baseline comfort level  Description: Interventions:- Encourage patient to monitor pain and request assistance- Assess pain using appropriate pain scale- Administer analgesics based on type and severity of pain and evaluate response- Implement non-pharmacological measures as appropriate and evaluate response- Consider cultural and social influences on pain and pain management- Notify physician/advanced practitioner if interventions unsuccessful or patient reports new pain  Outcome: Progressing     Problem: INFECTION - ADULT  Goal: Absence or prevention of progression during hospitalization  Description: INTERVENTIONS:- Assess and monitor for signs and symptoms of infection- Monitor lab/diagnostic results- Monitor all insertion sites, i.e. indwelling lines, tubes, and drains- Monitor endotracheal if appropriate and nasal secretions for changes in amount and color- [place] appropriate cooling/warming therapies per order- Administer medications as ordered- Instruct and encourage patient and family to use good hand hygiene technique- Identify and instruct in appropriate isolation precautions for identified infection/condition  Outcome: Progressing  Goal: Absence of fever/infection during neutropenic period  Description: INTERVENTIONS:- Monitor WBC  Outcome: Progressing     Problem: SAFETY ADULT  Goal: Patient will remain free of falls  Description: INTERVENTIONS:- Educate patient/family on patient safety including physical limitations- Instruct patient to call for assistance with activity - Consult OT/PT to assist with strengthening/mobility - Keep Call bell within reach- Keep bed low and locked with side rails adjusted as appropriate- Keep care items and personal belongings within reach- Initiate and maintain comfort rounds- Make Fall Risk Sign visible to staff- Offer Toileting every 2 Hours, in advance of need- Initiate/Maintain bed alarm-  Obtain necessary fall risk management equipment: call bell- Apply yellow socks and bracelet for high fall risk patients- Consider moving patient to room near nurses station  Outcome: Progressing     Problem: DISCHARGE PLANNING  Goal: Discharge to home or other facility with appropriate resources  Description: INTERVENTIONS:- Identify barriers to discharge w/patient and caregiver- Arrange for needed discharge resources and transportation as appropriate- Identify discharge learning needs (meds, wound care, etc.)- Arrange for interpretive services to assist at discharge as needed- Refer to Case Management Department for coordinating discharge planning if the patient needs post-hospital services based on physician/advanced practitioner order or complex needs related to functional status, cognitive ability, or social support system  Outcome: Progressing     Problem: Knowledge Deficit  Goal: Patient/family/caregiver demonstrates understanding of disease process, treatment plan, medications, and discharge instructions  Description: Complete learning assessment and assess knowledge base.Interventions:- Provide teaching at level of understanding- Provide teaching via preferred learning methods  Outcome: Progressing     Problem: METABOLIC, FLUID AND ELECTROLYTES - ADULT  Goal: Electrolytes maintained within normal limits  Description: INTERVENTIONS:- Monitor labs and assess patient for signs and symptoms of electrolyte imbalances- Administer electrolyte replacement as ordered- Monitor response to electrolyte replacements, including repeat lab results as appropriate- Instruct patient on fluid and nutrition as appropriate  Outcome: Progressing  Goal: Fluid balance maintained  Description: INTERVENTIONS:- Monitor labs - Monitor I/O and WT- Instruct patient on fluid and nutrition as appropriate- Assess for signs & symptoms of volume excess or deficit  Outcome: Progressing

## 2025-04-09 ENCOUNTER — TELEPHONE (OUTPATIENT)
Age: 65
End: 2025-04-09

## 2025-04-09 ENCOUNTER — TRANSITIONAL CARE MANAGEMENT (OUTPATIENT)
Age: 65
End: 2025-04-09

## 2025-04-09 VITALS
RESPIRATION RATE: 18 BRPM | SYSTOLIC BLOOD PRESSURE: 118 MMHG | WEIGHT: 186.51 LBS | TEMPERATURE: 99 F | DIASTOLIC BLOOD PRESSURE: 68 MMHG | OXYGEN SATURATION: 96 % | HEART RATE: 69 BPM | BODY MASS INDEX: 23.94 KG/M2 | HEIGHT: 74 IN

## 2025-04-09 PROBLEM — R19.7 DIARRHEA: Status: RESOLVED | Noted: 2019-12-22 | Resolved: 2025-04-09

## 2025-04-09 PROBLEM — E87.8 ELECTROLYTE ABNORMALITY: Status: RESOLVED | Noted: 2018-10-10 | Resolved: 2025-04-08

## 2025-04-09 LAB
ANION GAP SERPL CALCULATED.3IONS-SCNC: 12 MMOL/L (ref 4–13)
BUN SERPL-MCNC: 23 MG/DL (ref 5–25)
CALCIUM SERPL-MCNC: 8.2 MG/DL (ref 8.4–10.2)
CHLORIDE SERPL-SCNC: 94 MMOL/L (ref 96–108)
CO2 SERPL-SCNC: 24 MMOL/L (ref 21–32)
CREAT SERPL-MCNC: 1.05 MG/DL (ref 0.6–1.3)
GFR SERPL CREATININE-BSD FRML MDRD: 75 ML/MIN/1.73SQ M
GLUCOSE SERPL-MCNC: 128 MG/DL (ref 65–140)
MAGNESIUM SERPL-MCNC: 1.3 MG/DL (ref 1.9–2.7)
POTASSIUM SERPL-SCNC: 3.5 MMOL/L (ref 3.5–5.3)
SODIUM SERPL-SCNC: 130 MMOL/L (ref 135–147)

## 2025-04-09 PROCEDURE — 99239 HOSP IP/OBS DSCHRG MGMT >30: CPT | Performed by: INTERNAL MEDICINE

## 2025-04-09 PROCEDURE — 80048 BASIC METABOLIC PNL TOTAL CA: CPT

## 2025-04-09 PROCEDURE — 83735 ASSAY OF MAGNESIUM: CPT

## 2025-04-09 RX ORDER — SACCHAROMYCES BOULARDII 250 MG
250 CAPSULE ORAL 2 TIMES DAILY
Status: DISCONTINUED | OUTPATIENT
Start: 2025-04-09 | End: 2025-04-09 | Stop reason: HOSPADM

## 2025-04-09 RX ORDER — POTASSIUM CHLORIDE 1500 MG/1
40 TABLET, EXTENDED RELEASE ORAL ONCE
Status: COMPLETED | OUTPATIENT
Start: 2025-04-09 | End: 2025-04-09

## 2025-04-09 RX ORDER — SACCHAROMYCES BOULARDII 250 MG
250 CAPSULE ORAL 2 TIMES DAILY
Qty: 30 CAPSULE | Refills: 2 | Status: ON HOLD | OUTPATIENT
Start: 2025-04-09 | End: 2025-04-16

## 2025-04-09 RX ORDER — MAGNESIUM SULFATE HEPTAHYDRATE 40 MG/ML
4 INJECTION, SOLUTION INTRAVENOUS ONCE
Status: COMPLETED | OUTPATIENT
Start: 2025-04-09 | End: 2025-04-09

## 2025-04-09 RX ADMIN — POTASSIUM CHLORIDE 40 MEQ: 1500 TABLET, EXTENDED RELEASE ORAL at 09:43

## 2025-04-09 RX ADMIN — FLUTICASONE FUROATE AND VILANTEROL TRIFENATATE 1 PUFF: 200; 25 POWDER RESPIRATORY (INHALATION) at 08:42

## 2025-04-09 RX ADMIN — ASPIRIN 81 MG: 81 TABLET, CHEWABLE ORAL at 08:41

## 2025-04-09 RX ADMIN — METOPROLOL TARTRATE 150 MG: 100 TABLET, FILM COATED ORAL at 08:41

## 2025-04-09 RX ADMIN — Medication 250 MG: at 08:41

## 2025-04-09 RX ADMIN — FERROUS SULFATE TAB 325 MG (65 MG ELEMENTAL FE) 325 MG: 325 (65 FE) TAB at 08:41

## 2025-04-09 RX ADMIN — APIXABAN 5 MG: 5 TABLET, FILM COATED ORAL at 08:41

## 2025-04-09 RX ADMIN — DILTIAZEM HYDROCHLORIDE 120 MG: 60 CAPSULE, EXTENDED RELEASE ORAL at 08:42

## 2025-04-09 RX ADMIN — TAMSULOSIN HYDROCHLORIDE 0.4 MG: 0.4 CAPSULE ORAL at 08:41

## 2025-04-09 RX ADMIN — MAGNESIUM SULFATE HEPTAHYDRATE 4 G: 40 INJECTION, SOLUTION INTRAVENOUS at 09:43

## 2025-04-09 RX ADMIN — PANTOPRAZOLE SODIUM 40 MG: 40 TABLET, DELAYED RELEASE ORAL at 08:41

## 2025-04-09 RX ADMIN — THIAMINE HCL TAB 100 MG 100 MG: 100 TAB at 08:41

## 2025-04-09 RX ADMIN — RANOLAZINE 500 MG: 500 TABLET, FILM COATED, EXTENDED RELEASE ORAL at 08:41

## 2025-04-09 RX ADMIN — FOLIC ACID 1000 MCG: 1 TABLET ORAL at 08:42

## 2025-04-09 NOTE — ASSESSMENT & PLAN NOTE
Chronic hyponatremia likely secondary to alcohol use. POA .  127 - 128 - 129 - 130     IVF given, BMP trended, discharge Na was 130, patient stayed asymptomatic  Fluid restriction up to 1500 ml /day   Ensure 1 can BID daily to help with salt replacement   Patient was  recommended to RESTART salt tablets multiple times, he continued to refuse due to nausea/vomiting  Tolvaptan cannot be offered due to alcoholic cirrhosis

## 2025-04-09 NOTE — PLAN OF CARE
Problem: PAIN - ADULT  Goal: Verbalizes/displays adequate comfort level or baseline comfort level  Description: Interventions:- Encourage patient to monitor pain and request assistance- Assess pain using appropriate pain scale- Administer analgesics based on type and severity of pain and evaluate response- Implement non-pharmacological measures as appropriate and evaluate response- Consider cultural and social influences on pain and pain management- Notify physician/advanced practitioner if interventions unsuccessful or patient reports new pain  4/9/2025 1234 by Jen Monique RN  Outcome: Adequate for Discharge  4/9/2025 0738 by Jen Monique RN  Outcome: Progressing     Problem: INFECTION - ADULT  Goal: Absence or prevention of progression during hospitalization  Description: INTERVENTIONS:- Assess and monitor for signs and symptoms of infection- Monitor lab/diagnostic results- Monitor all insertion sites, i.e. indwelling lines, tubes, and drains- Monitor endotracheal if appropriate and nasal secretions for changes in amount and color- [place] appropriate cooling/warming therapies per order- Administer medications as ordered- Instruct and encourage patient and family to use good hand hygiene technique- Identify and instruct in appropriate isolation precautions for identified infection/condition  4/9/2025 1234 by Jen Monique RN  Outcome: Adequate for Discharge  4/9/2025 0738 by Jen Monique RN  Outcome: Progressing  Goal: Absence of fever/infection during neutropenic period  Description: INTERVENTIONS:- Monitor WBC  4/9/2025 1234 by Jen Monique RN  Outcome: Adequate for Discharge  4/9/2025 0738 by Jen Monique RN  Outcome: Progressing     Problem: SAFETY ADULT  Goal: Patient will remain free of falls  Description: INTERVENTIONS:- Educate patient/family on patient safety including physical limitations- Instruct patient to call for assistance with activity - Consult OT/PT to  assist with strengthening/mobility - Keep Call bell within reach- Keep bed low and locked with side rails adjusted as appropriate- Keep care items and personal belongings within reach- Initiate and maintain comfort rounds- Make Fall Risk Sign visible to staff- Offer Toileting every 2 Hours, in advance of need- Initiate/Maintain bed/chair alarm- Obtain necessary fall risk management equipment: bed/chair alarm-- Apply yellow socks and bracelet for high fall risk patients- Consider moving patient to room near nurses station  4/9/2025 1234 by Jen Monique RN  Outcome: Adequate for Discharge  4/9/2025 0738 by Jen Monique RN  Outcome: Progressing  Goal: Maintain or return to baseline ADL function  Description: INTERVENTIONS:-  Assess patient's ability to carry out ADLs; assess patient's baseline for ADL function and identify physical deficits which impact ability to perform ADLs (bathing, care of mouth/teeth, toileting, grooming, dressing, etc.)- Assess/evaluate cause of self-care deficits - Assess range of motion- Assess patient's mobility; develop plan if impaired- Assess patient's need for assistive devices and provide as appropriate- Encourage maximum independence but intervene and supervise when necessary- Involve family in performance of ADLs- Assess for home care needs following discharge - Consider OT consult to assist with ADL evaluation and planning for discharge- Provide patient education as appropriate  4/9/2025 1234 by Jen Monique RN  Outcome: Adequate for Discharge  4/9/2025 0738 by Jen Monique RN  Outcome: Progressing  Goal: Maintains/Returns to pre admission functional level  Description: INTERVENTIONS:- Perform AM-PAC 6 Click Basic Mobility/ Daily Activity assessment daily.- Set and communicate daily mobility goal to care team and patient/family/caregiver. - Collaborate with rehabilitation services on mobility goals if consulted- Perform Range of Motion 3 times a day.-  Reposition patient every 3 hours.- Dangle patient 3 times a day- Stand patient 3 times a day- Ambulate patient 3 times a day- Out of bed to chair 3 times a day - Out of bed for meals 3 times a day- Out of bed for toileting- Record patient progress and toleration of activity level   4/9/2025 1234 by Jen Monique RN  Outcome: Adequate for Discharge  4/9/2025 0738 by Jen Monique RN  Outcome: Progressing     Problem: DISCHARGE PLANNING  Goal: Discharge to home or other facility with appropriate resources  Description: INTERVENTIONS:- Identify barriers to discharge w/patient and caregiver- Arrange for needed discharge resources and transportation as appropriate- Identify discharge learning needs (meds, wound care, etc.)- Arrange for interpretive services to assist at discharge as needed- Refer to Case Management Department for coordinating discharge planning if the patient needs post-hospital services based on physician/advanced practitioner order or complex needs related to functional status, cognitive ability, or social support system  4/9/2025 1234 by Jen Monique RN  Outcome: Adequate for Discharge  4/9/2025 0738 by Jen Monique RN  Outcome: Progressing     Problem: Knowledge Deficit  Goal: Patient/family/caregiver demonstrates understanding of disease process, treatment plan, medications, and discharge instructions  Description: Complete learning assessment and assess knowledge base.Interventions:- Provide teaching at level of understanding- Provide teaching via preferred learning methods  4/9/2025 1234 by Jen Monique RN  Outcome: Adequate for Discharge  4/9/2025 0738 by Jen Monique RN  Outcome: Progressing     Problem: METABOLIC, FLUID AND ELECTROLYTES - ADULT  Goal: Electrolytes maintained within normal limits  Description: INTERVENTIONS:- Monitor labs and assess patient for signs and symptoms of electrolyte imbalances- Administer electrolyte replacement as ordered- Monitor  response to electrolyte replacements, including repeat lab results as appropriate- Instruct patient on fluid and nutrition as appropriate  4/9/2025 1234 by Jen Monique RN  Outcome: Adequate for Discharge  4/9/2025 0738 by Jen Monique RN  Outcome: Progressing  Goal: Fluid balance maintained  Description: INTERVENTIONS:- Monitor labs - Monitor I/O and WT- Instruct patient on fluid and nutrition as appropriate- Assess for signs & symptoms of volume excess or deficit  4/9/2025 1234 by Jen Monique RN  Outcome: Adequate for Discharge  4/9/2025 0738 by Jen Monique RN  Outcome: Progressing

## 2025-04-09 NOTE — PLAN OF CARE
Problem: PAIN - ADULT  Goal: Verbalizes/displays adequate comfort level or baseline comfort level  Description: Interventions:- Encourage patient to monitor pain and request assistance- Assess pain using appropriate pain scale- Administer analgesics based on type and severity of pain and evaluate response- Implement non-pharmacological measures as appropriate and evaluate response- Consider cultural and social influences on pain and pain management- Notify physician/advanced practitioner if interventions unsuccessful or patient reports new pain  Outcome: Progressing     Problem: INFECTION - ADULT  Goal: Absence or prevention of progression during hospitalization  Description: INTERVENTIONS:- Assess and monitor for signs and symptoms of infection- Monitor lab/diagnostic results- Monitor all insertion sites, i.e. indwelling lines, tubes, and drains- Monitor endotracheal if appropriate and nasal secretions for changes in amount and color- [place] appropriate cooling/warming therapies per order- Administer medications as ordered- Instruct and encourage patient and family to use good hand hygiene technique- Identify and instruct in appropriate isolation precautions for identified infection/condition  Outcome: Progressing  Goal: Absence of fever/infection during neutropenic period  Description: INTERVENTIONS:- Monitor WBC  Outcome: Progressing     Problem: SAFETY ADULT  Goal: Patient will remain free of falls  Description: INTERVENTIONS:- Educate patient/family on patient safety including physical limitations- Instruct patient to call for assistance with activity - Consult OT/PT to assist with strengthening/mobility - Keep Call bell within reach- Keep bed low and locked with side rails adjusted as appropriate- Keep care items and personal belongings within reach- Initiate and maintain comfort rounds- Make Fall Risk Sign visible to staff- Offer Toileting every 2 Hours, in advance of need- Initiate/Maintain bed/chair  alarm- Obtain necessary fall risk management equipment: bed/chair alarm-- Apply yellow socks and bracelet for high fall risk patients- Consider moving patient to room near nurses station  Outcome: Progressing     Problem: DISCHARGE PLANNING  Goal: Discharge to home or other facility with appropriate resources  Description: INTERVENTIONS:- Identify barriers to discharge w/patient and caregiver- Arrange for needed discharge resources and transportation as appropriate- Identify discharge learning needs (meds, wound care, etc.)- Arrange for interpretive services to assist at discharge as needed- Refer to Case Management Department for coordinating discharge planning if the patient needs post-hospital services based on physician/advanced practitioner order or complex needs related to functional status, cognitive ability, or social support system  Outcome: Progressing     Problem: Knowledge Deficit  Goal: Patient/family/caregiver demonstrates understanding of disease process, treatment plan, medications, and discharge instructions  Description: Complete learning assessment and assess knowledge base.Interventions:- Provide teaching at level of understanding- Provide teaching via preferred learning methods  Outcome: Progressing     Problem: METABOLIC, FLUID AND ELECTROLYTES - ADULT  Goal: Electrolytes maintained within normal limits  Description: INTERVENTIONS:- Monitor labs and assess patient for signs and symptoms of electrolyte imbalances- Administer electrolyte replacement as ordered- Monitor response to electrolyte replacements, including repeat lab results as appropriate- Instruct patient on fluid and nutrition as appropriate  Outcome: Progressing  Goal: Fluid balance maintained  Description: INTERVENTIONS:- Monitor labs - Monitor I/O and WT- Instruct patient on fluid and nutrition as appropriate- Assess for signs & symptoms of volume excess or deficit  Outcome: Progressing

## 2025-04-09 NOTE — ASSESSMENT & PLAN NOTE
Chronic stable smoked 1.5 packs x day    Nicotine 21 mg / 24 hour patch  Smoking cessation encouraged

## 2025-04-09 NOTE — ASSESSMENT & PLAN NOTE
Lab Results   Component Value Date    EGFR 75 04/09/2025    EGFR 82 04/08/2025    EGFR 83 04/08/2025    CREATININE 1.05 04/09/2025    CREATININE 0.98 04/08/2025    CREATININE 0.97 04/08/2025     On admission creatinine 1.87. Baseline around 1.1-1.2.   Likely Prerenal ENMA in the setting of poor p.o. intake and dehydration    ED Course: 3 L NS bolus, 2g IV Mg SO4  CT head: No acute intracranial hemorrhage, significant mass effect or midline shift.   CT spine: No acute cervical spine fracture or traumatic malalignment.      Plan:   Cr back to baseline after being treated with 75 mL/hr IV NS (PMH of diastolic HF w/ latest LVEF of 65%), discontinued IVF after ENMA resolved  Replete electrolytes as needed   Resume home gabapentin, continue on discharge  Avoid nephrotoxic agents  Stay hydrated

## 2025-04-09 NOTE — ASSESSMENT & PLAN NOTE
Patient presented to the ER after a fall in the bathroom, he denies any head trauma. Last drink was 2 days ago, he states that he has been drinking a bottle of vodka for the last 3 months until 2 days ago.   Presentation, patient was dehydrated had tremors and sweating.  No signs of altered mental status    In the ER patient received Valium 5mg IV, Ativan 2 mg IV, LR 1000 ml bolus * 3  Received 3 doses of IV Ativan 2 mg on 4/6     Plan  Pt was placed on CIWA protocol initially. He scored very low on CIWA the last few days of his stay  Seizure precautions  were followed  Thiamine, folate and multivitamins   IV hydration given with IV LR 75 ml/hr  that was later discontinued  Fall precautions followed  Seizure precautions followed

## 2025-04-09 NOTE — ASSESSMENT & PLAN NOTE
POA Mag 1.0.  In the ED patient received magnesium sulfate 2 g IV  Patient has chronic hypomagnesemia in the setting of alcohol use  Magnesium holding at 1.4 after IV Mag 4g. Patient had large loose BM per nurse.     Repleted multiple times with IV mag   Continue with home magnesium oxide 800 mg twice daily  Monitor and Replete as required  Mag in 1 week upon discharge

## 2025-04-09 NOTE — ASSESSMENT & PLAN NOTE
Pt states that he fell while on the toilet and was unable to get back up prompting EMS transport to the hospital  CT head and CT spine did not show any fracture or trauma      Fall precautions followed during hospital stay

## 2025-04-09 NOTE — DISCHARGE SUMMARY
Discharge Summary - Family Medicine   Name: Gregg Partida 64 y.o. male I MRN: [id number]  Unit/Bed#: 4 [facility] 402-01 I Date of Admission: 4/5/2025   Date of Service: 4/9/2025 I Hospital Day: 4    Medical Problems       Resolved Problems  Date Reviewed: 12/9/2024          Resolved    Acute kidney injury superimposed on CKD  (HCC) 4/7/2025     Resolved by  Rich Finn MD    * (Principal) Alcohol withdrawal (HCC) 4/8/2025     Resolved by  Doron Reyes MD    Overview Signed 11/17/2022  2:01 PM by Donte Clark MD       11/17/22   Detoxed in hospital, but still does occasionally drink.          SIRS (systemic inflammatory response syndrome) (HCC) 4/8/2025     Resolved by  Doron Reyes MD    Diarrhea 4/9/2025     Resolved by  Doron Reyes MD    Metabolic acidosis, increased anion gap 4/7/2025     Resolved by  Rich Finn MD    Traumatic rhabdomyolysis (HCC) 4/7/2025     Resolved by  Doron Reyes MD        Discharging Physician / Practitioner: Doron Reyes MD  PCP: Lilliana Bliss MD  Admission Date:   Admission Orders (From admission, onward)       Ordered        04/05/25 2109  INPATIENT ADMISSION  Once                          Discharge Date: 04/09/25    Consultations During Hospital Stay:  Speech pathology    Procedures Performed:   None    Significant Findings / Test Results:   4/9/25: Na 130, Mg 1.3  4/8/25: Na 127, 128  4/7/24: Hb 9.3 Na 129 Mg 1.4,   4/6/25: Hb 9.5 Na 131 Mg 1.4  4/5/25: Hgb 11.5, Na 131, AG 32, BUN 41. Cr 1.87, total Bili 1.76, eGFR 37     Incidental Findings:   None    Test Results Pending at Discharge (will require follow up):   None     Outpatient Tests Requested:  BMP  Mg  Complications:  None    Reason for Admission: Fall and alcohol withdrawal    Hospital Course:      64 year old male with a PMH of alcohol abuse, CAD, A-fib on anticoagulation, diabetes mellitus type 2, hypertension, COPD, GERD, nicotine dependence, hyponatremia  who presented to the ED after a fall at  "home. He states that he fell while on the toilet and was unable to get back up prompting EMS transport to the hospital. Patient states that his last alcoholic drink was 2 days ago.  He has been drinking approximately 1 bottle of vodka daily for the past few months. Admitted for alcohol withdrawal, ENMA and Atrial fibrillation. Patient was hydrated with IV fluids, CIWA protocol was initiated and patient was also continued on thiamine, folic acids and multivitamins. ENMA improved. Patient stayed hemodynamically stable and his Cr returned to baseline. For atrial fibrillation, telemetry was monitored and patient was given lopressor, digoxin as needed. Patient's Sodium dropped to 127 on 04/07. He was given 250 cc bolus that increased the sodium to 128, and later 129. Patient continues to deny salt tablets. He was discharged in stable condition with recommendation to follow up with repeat BMP in 1 week. Details in AVS.      SIRS (systemic inflammatory response syndrome) (HCC)-resolved as of 4/8/2025  Assessment & Plan  POA, tachycardia and tachypnea, less likely infectious origin. Evidence of  acute organ dysfunction (ENMA) due to dehydration, alcohol abuse and withdrawal in the setting of ENMA, thrombocytopenia, and elevate lactate.     Lactic acid improved from 4.3 to 1.1 after IV fluid resuscitation  Patient is afebrile with no leukocytosis    Treated with IVF bolus and serial labs   Monitor vitals, unlikely to be an infectious source  Plan under alcohol withdrawal    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 disease (HCC)  Assessment & Plan  Lab Results   Component Value Date    HGBA1C 5.4 02/15/2025       No results for input(s): \"POCGLU\" in the last 72 hours.    Blood Sugar Average: Last 72 hrs:    Chronic. Stable. Patient is home on 500mg Metformin tabs daily     Plan:   Held home Metformin   ISS w/ accu checks, ACHS  Hypoglycemia protocol   YOAN/CHO carb diet   Continue to monitor   Resume Metformin on " discharge      * Alcohol withdrawal (HCC)-resolved as of 4/8/2025  Assessment & Plan  Patient presented to the ER after a fall in the bathroom, he denies any head trauma. Last drink was 2 days ago, he states that he has been drinking a bottle of vodka for the last 3 months until 2 days ago.   Presentation, patient was dehydrated had tremors and sweating.  No signs of altered mental status    In the ER patient received Valium 5mg IV, Ativan 2 mg IV, LR 1000 ml bolus * 3  Received 3 doses of IV Ativan 2 mg on 4/6     Plan  Pt was placed on CIWA protocol initially. He scored very low on CIWA the last few days of his stay  Seizure precautions  were followed  Thiamine, folate and multivitamins   IV hydration given with IV LR 75 ml/hr  that was later discontinued  Fall precautions followed  Seizure precautions followed    Fall  Assessment & Plan  Pt states that he fell while on the toilet and was unable to get back up prompting EMS transport to the hospital  CT head and CT spine did not show any fracture or trauma      Fall precautions followed during hospital stay    Hyponatremia  Assessment & Plan  Chronic hyponatremia likely secondary to alcohol use. POA .  127 - 128 - 129 - 130     IVF given, BMP trended, discharge Na was 130, patient stayed asymptomatic  Fluid restriction up to 1500 ml /day   Ensure 1 can BID daily to help with salt replacement   Patient was  recommended to RESTART salt tablets multiple times, he continued to refuse due to nausea/vomiting  Tolvaptan cannot be offered due to alcoholic cirrhosis    Afib with RVR  Assessment & Plan  Chronic. Stable   Home regimen -  5 mg Eliquis BID, 120 mg Cardizem SR Q12, and 200 mg Lopressor Q12.  Noted to have elevated HR upto 170s during hospital course     4/5/2025 EKG Afib    Plan  Received Digoxin x1 (4/6) due to low BP, unable to tolerate lopressor. Required IV lopressor 5 mg overnight (4/6)  Continue with home Lopressor and Cardizem upon  discharge  Continue home med Eliquis 5 mg BID  Patient was monitored on telemetry that was later discontinued    Electrolyte abnormality  Assessment & Plan  POA Mag 1.0.  In the ED patient received magnesium sulfate 2 g IV  Patient has chronic hypomagnesemia in the setting of alcohol use  Magnesium holding at 1.4 after IV Mag 4g. Patient had large loose BM per nurse.     Repleted multiple times with IV mag   Continue with home magnesium oxide 800 mg twice daily  Monitor and Replete as required  Mag in 1 week upon discharge    Nicotine abuse  Assessment & Plan  Chronic stable smoked 1.5 packs x day    Nicotine 21 mg / 24 hour patch  Smoking cessation encouraged    CAD (coronary artery disease)  Assessment & Plan  Patient has a h/o CAD s/p CABG. Patient is home on Aspirin 81 mg EC tablet daily, Ranolazine 500 mg Q12, and Eliquis 5 mg BID.     Plan:  Continue home Aspirin 81mg  Continue home Eliquis  Continue home Ranolazine     History of prostate cancer  Assessment & Plan  Chronic, stable, PMH of prostate CA (2020), s/p resection 2021    Continue home medication flomax 0.4 mg daily    COPD, severity to be determined (HCC)  Assessment & Plan  Chronic stable.  Not in acute exacerbation.  Denies any shortness of breath    Continue home Albuterol inhaler, Breo Ellipta inhaler    Traumatic rhabdomyolysis (HCC)-resolved as of 4/7/2025  Assessment & Plan  POA due to fall, unknown down time a/e/b multiple bruises over body, elevated total CK after IVF boluses  Total     Treated with 3 liters IVF bolus, IV hydration  Continue PO intake     Diarrhea-resolved as of 4/9/2025  Assessment & Plan  Had 1 episode of diarrhea. Continue Florastar upon discharge, Discuss upon TCM visit if it should be continued    Acute kidney injury superimposed on CKD  (HCC)-resolved as of 4/7/2025  Assessment & Plan  Lab Results   Component Value Date    EGFR 75 04/09/2025    EGFR 82 04/08/2025    EGFR 83 04/08/2025    CREATININE 1.05  "04/09/2025    CREATININE 0.98 04/08/2025    CREATININE 0.97 04/08/2025     On admission creatinine 1.87. Baseline around 1.1-1.2.   Likely Prerenal ENMA in the setting of poor p.o. intake and dehydration    ED Course: 3 L NS bolus, 2g IV Mg SO4  CT head: No acute intracranial hemorrhage, significant mass effect or midline shift.   CT spine: No acute cervical spine fracture or traumatic malalignment.      Plan:   Cr back to baseline after being treated with 75 mL/hr IV NS (PMH of diastolic HF w/ latest LVEF of 65%), discontinued IVF after ENMA resolved  Replete electrolytes as needed   Resume home gabapentin, continue on discharge  Avoid nephrotoxic agents  Stay hydrated           Questions to ask  Did you get blood work done? BMP and Mag?  Are you still drinking alcohol and smoking?  Did you have any falls?  Did you restart salt tablets?    Condition at Discharge: stable    Discharge Day Visit / Exam:   Subjective:  Patient feels well, AOA x 3 , Able to ambulate  Vitals: Blood Pressure: 118/68 (04/09/25 1502)  Pulse: 69 (04/09/25 0840)  Temperature: 99 °F (37.2 °C) (04/09/25 0840)  Temp Source: Oral (04/09/25 0840)  Respirations: 18 (04/09/25 1502)  Height: 6' 2\" (188 cm) (04/06/25 0038)  Weight - Scale: 84.6 kg (186 lb 8.2 oz) (04/06/25 0038)  SpO2: 96 % (04/09/25 0840)  Physical Exam  Vitals and nursing note reviewed.   Constitutional:       General: He is not in acute distress.     Appearance: He is well-developed.   HENT:      Head: Normocephalic and atraumatic.   Eyes:      Conjunctiva/sclera: Conjunctivae normal.   Cardiovascular:      Rate and Rhythm: Normal rate and regular rhythm.      Pulses: Normal pulses.      Heart sounds: No murmur heard.  Pulmonary:      Effort: Pulmonary effort is normal. No respiratory distress.      Breath sounds: Normal breath sounds.   Abdominal:      Palpations: Abdomen is soft.      Tenderness: There is no abdominal tenderness.   Musculoskeletal:         General: No swelling.     " " Cervical back: Neck supple.   Skin:     General: Skin is warm and dry.      Capillary Refill: Capillary refill takes less than 2 seconds.      Comments: Numerous healing bruises present on right arm   Neurological:      Mental Status: He is alert and oriented to person, place, and time.      Cranial Nerves: No cranial nerve deficit.      Sensory: No sensory deficit.      Motor: No weakness.      Coordination: Coordination normal.   Psychiatric:         Mood and Affect: Mood normal.          Discussion with Family: Patient declined call to .     Discharge instructions/Information to patient and family:   See after visit summary for information provided to patient and family.      Provisions for Follow-Up Care:  See after visit summary for information related to follow-up care and any pertinent home health orders.      Mobility at time of Discharge:   Basic Mobility Inpatient Raw Score: 15  JH-HLM Goal: 4: Move to chair/commode  JH-HLM Achieved: 4: Move to chair/commode  HLM Goal achieved. Continue to encourage appropriate mobility.     Disposition:   Home    Planned Readmission: No    Discharge Medications:  See after visit summary for reconciled discharge medications provided to patient and/or family.      Administrative Statements   Discharge Statement:  I have spent a total time of 35 minutes in caring for this patient on the day of the visit/encounter. .    **Please Note: This note may have been constructed using a voice recognition system**      Portions of the record may have been created with voice recognition software. Occasional wrong word or \"sound a like\" substitutions may have occurred due to the inherent limitations of voice recognition software. Read the chart carefully and recognize, using context, where substitutions have occurred.If you have any questions, please contact the dictating provider.     Doron Reyes MD  PGY2 Family Medicine resident  [facility] " Network

## 2025-04-09 NOTE — ASSESSMENT & PLAN NOTE
"Lab Results   Component Value Date    HGBA1C 5.4 02/15/2025       No results for input(s): \"POCGLU\" in the last 72 hours.    Blood Sugar Average: Last 72 hrs:    Chronic. Stable. Patient is home on 500mg Metformin tabs daily     Plan:   Held home Metformin   ISS w/ accu checks, ACHS  Hypoglycemia protocol   YOAN/CHO carb diet   Continue to monitor   Resume Metformin on discharge    "

## 2025-04-09 NOTE — PLAN OF CARE

## 2025-04-09 NOTE — CASE MANAGEMENT
Case Management Assessment & Discharge Planning Note    Patient name Gregg Partida  Location [address] [address]/[address] [address]-* MRN [id number]  : 1960 Date 2025       Current Admission Date: 2025  Current Admission Diagnosis:History of prostate cancer   Patient Active Problem List    Diagnosis Date Noted Date Diagnosed    Atrial fibrillation with rapid ventricular response (HCC) 2025     Dyspnea on exertion 2025     Smoking 2024     Fall 2024     Head injury 2024     Hypotension 2024     Mucus plugging of bronchi 2024     Acute respiratory failure with hypoxia (HCC) 2024     Hyponatremia 2024     Closed fracture of one rib of left side 2024     Ambulatory dysfunction 2024     Financial insecurity 2024     BPH (benign prostatic hyperplasia) 2024     Chronic alcohol use 2024     Hypertension 2024     Hemorrhagic cystitis 2024     Hypothyroidism 2024     Gastrointestinal hemorrhage with melena 10/29/2023     Chest pain 2023     Longstanding persistent atrial fibrillation (HCC) 2023     GERD (gastroesophageal reflux disease) 2023     Stable angina (HCC) 2022     Stage 3a chronic kidney disease (HCC) 2022     Fatty liver, alcoholic 04/15/2022     Nonischemic nontraumatic myocardial injury 04/10/2022     Afib with RVR 10/25/2021     Mild protein-calorie malnutrition (HCC) 2021     Prostate cancer (HCC) 2021     Atrial fibrillation (HCC) 2020     Chronic heart failure with preserved ejection fraction (HCC) 2019     Noncompliance 2019     ETOH abuse 2019     Generalized weakness 10/17/2019     Electrolyte abnormality 10/17/2019     Cervical spinal stenosis 10/17/2019     History of prostate cancer 2019     CAD (coronary artery disease) 2019     Nicotine abuse 2019     Depression, recurrent (HCC) 10/10/2018     Hx of CABG  10/10/2018     Dyslipidemia 10/10/2018     COPD, severity to be determined (HCC) 10/09/2018     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 disease (HCC) 10/09/2018     Hypertensive heart and chronic kidney disease with heart failure and stage 1 through stage 4 chronic kidney disease, or chronic kidney disease (HCC) 10/09/2018       LOS (days): 4  Geometric Mean LOS (GMLOS) (days): 4.9  Days to GMLOS:1.2     OBJECTIVE:    Risk of Unplanned Readmission Score: 65.3         Current admission status: Inpatient  Referral Reason: Other (Discharge planning)    Preferred Pharmacy:   [facility] [place], [place] - [address] [address]  Phone: [phone] Fax: [phone]    [facility] [address] [place] [address] - [address]  [address]  [facility] [address]  Phone: [phone] Fax: [phone]    Primary Care Provider: Lilliana Bliss MD    Primary Insurance: weendy REP  Secondary Insurance:     ASSESSMENT:  Active Health Care Proxies       Gregg Partida Jr Health Care Representative - Son   Primary Phone: [phone] (Mobile)                  Readmission Root Cause  30 Day Readmission: No    Patient Information  Admitted from:: Home  Mental Status: Alert  During Assessment patient was accompanied by: Not accompanied during assessment  Assessment information provided by:: Son, Patient  Primary Caregiver: Self  Support Systems: Son  County of Residence: [place]  What city do you live in?: [place]  Home entry access options. Select all that apply.: Elevator  Type of Current Residence: Apartment  Floor Level: 2  Upon entering residence, is there a bedroom on the main floor (no further steps)?: Yes  Upon entering residence, is there a bathroom on the main floor (no further steps)?: Yes  Living Arrangements: Lives Alone    Activities of Daily Living Prior to Admission  Functional Status: Independent  Completes ADLs independently?:  Yes  Ambulates independently?: Yes  Does patient use assisted devices?: Yes  Assisted Devices (DME) used: Rollator  Does patient currently own DME?: Yes  What DME does the patient currently own?: Rollator  Does patient currently have HHC?: No    Patient Information Continued  Income Source: SSI/SSD  Does patient have prescription coverage?: Yes  Can the patient afford their medications and any related supplies (such as glucometers or test strips)?: Yes  Does patient receive dialysis treatments?: No  Does patient have a history of substance abuse?: Yes  Historical substance use preference: Alcohol/ETOH  Is patient currently in treatment for substance abuse?: No. Patient declined treatment information.    Means of Transportation  [facility]       DISCHARGE DETAILS:    Discharge planning discussed with:: Patient and son Gregg (by phone)  Freedom of Choice: Yes    Comments - Freedom of Choice: Patient lives alone and his preference is to return home at discharge.  STR and HHC services were offered to patient and declined.  Patient did request outpatient PT/OT orders as he expressed interest in getting therapy to get his 's license back.  Orders were requested from attending.      Per discussion with nursing, patient does not appear steady enough for a Lyft and [facility] transport was requested in [employer] for 1600.  Pickup time remains pending and SW will notify nursing when assigned.  SW spoke with son Gregg by phone and he is aware of plan for discharge today.  Transport arrangement was reviewed with son and he is aware that patient will be responsible for the cost of the transport.      CM contacted family/caregiver?: Yes  Were Treatment Team discharge recommendations reviewed with patient/caregiver?: Yes  Did patient/caregiver verbalize understanding of patient care needs?: Yes  Were patient/caregiver advised of the risks associated with not following Treatment Team discharge  recommendations?: Yes    Contacts  Patient Contacts: Gregg Partida (son)  Relationship to Patient:: Family  Contact Method: Phone  Phone Number: [phone]  Reason/Outcome: Emergency Contact, Discharge Planning    Requested Home Health Care         Is the patient interested in HHC at discharge?: No    DME Referral Provided  Referral made for DME?: No    Other Referral/Resources/Interventions Provided:  Interventions: Transportation    Would you like to participate in our [facility] service program?  : No - Declined    Treatment Team Recommendation: Home  Discharge Destination Plan:: Home  Transport at Discharge : Wheelchair van     Number/Name of Dispatcher: [facility] via [employer]  Transported by (Company and Unit #): TBD  ETA of Transport (Date): 04/09/25  ETA of Transport (Time): 1600 (requested, pickup time pending)         IMM Given (Date):: 04/09/25  IMM Given to:: Patient (IMM reviewed with and signed by patient.  Patient is in agreement with discharge determination.  Copy placed in scan bin for chart.)

## 2025-04-09 NOTE — ASSESSMENT & PLAN NOTE
[facility] Medicine Services  HISTORY AND PHYSICAL    Date of Admission: 2024  Primary Care Physician: Eric Espinoza MD    Subjective   Primary Historian: Patient    Chief Complaint: Arm pain    Fall      74-year-old female presents emergency department status post fall.  The patient states that she was getting ready to go to bed.  She noticed one of her grandchildren's toys in the floor and she slipped on it and fell.  She landed on her left arm.  She has a left humeral fracture.  She had no loss of consciousness.  She has had no chest pain, shortness of breath, or acute bowel or kidney function.  She notes no other injury.  She does have past medical history to include hypertension, hyperlipidemia, and non-insulin-dependent diabetes.        Review of Systems   Musculoskeletal:  Positive for arthralgias.      Otherwise complete ROS reviewed and negative except as mentioned in the HPI.    Past Medical History:   Past Medical History:   Diagnosis Date    Arrhythmia     Family history of colonic polyps     GERD (gastroesophageal reflux disease)     History of adenomatous polyp of colon     History of colon polyps     Hyperlipidemia     Hypertension     MVP (mitral valve prolapse)     Pseudotumor cerebri     Type 2 diabetes mellitus      Past Surgical History:  Past Surgical History:   Procedure Laterality Date    CARDIAC CATHETERIZATION      CATARACT EXTRACTION Right 08/15/2021    CATARACT EXTRACTION Left 2021     SECTION      CHOLECYSTECTOMY      COLONOSCOPY  2015    One 3-4mm hyperplastic polyp in the rectum; Repeat 5 years    COLONOSCOPY  2007    Normal; Repeat 3-4 years    COLONOSCOPY  2004    One 2cm pedunculated erythematous adenomatous polyp in the sigmoid colon; One 6mm hyperplastic polyp in the rectum; Repeat 3 years    COLONOSCOPY N/A 2021    One 7mm tubular adenomatous polyp in the transverse colon; The examination was  Chronic. Stable   Home regimen -  5 mg Eliquis BID, 120 mg Cardizem SR Q12, and 200 mg Lopressor Q12.  Noted to have elevated HR upto 170s during hospital course     4/5/2025 EKG Afib    Plan  Received Digoxin x1 (4/6) due to low BP, unable to tolerate lopressor. Required IV lopressor 5 mg overnight (4/6)  Continue with home Lopressor and Cardizem upon discharge  Continue home med Eliquis 5 mg BID  Patient was monitored on telemetry that was later discontinued   otherwise normal on direct and retroflexion views; Repeat 5 years    DILATATION AND CURETTAGE      ENDOSCOPY  09/30/2009    Normal endoscopy    ENDOSCOPY  08/30/2004    GERD    ENDOSCOPY N/A 02/13/2023    Small HH; Non-obstructing Schatzki ring-Dilated; Normal stomach; Normal examined duodenum; No specimens collected    HERNIA REPAIR      HYSTERECTOMY      LAPAROSCOPIC LYSIS OF ADHESIONS      OOPHORECTOMY      OTHER SURGICAL HISTORY  06/14/2010    EUS-Dr. Hastings-Normal EUS evaluation of the pancreas, bile duct, and ampulla of Vater-pancreatitis remains idiopathic     Social History:  reports that she has never smoked. She has never used smokeless tobacco. She reports that she does not drink alcohol and does not use drugs.    Family History: family history includes Breast cancer in her maternal grandmother; Colon polyps in her father; Heart disease in her mother; Prostate cancer in her father; Stomach cancer (age of onset: 48) in her paternal grandmother.       Allergies:  Allergies   Allergen Reactions    Lactose Intolerance (Gi) GI Intolerance     Takes Lactaid       Medications:  Prior to Admission medications    Medication Sig Start Date End Date Taking? Authorizing Provider   acetaminophen (TYLENOL) 325 MG tablet Take 1 tablet by mouth As Needed.    ProviderAnn Marie MD   amLODIPine (NORVASC) 2.5 MG tablet Take 2 tablets by mouth 2 (Two) Times a Day. 7/8/24   Yuni Sanchez APRN   Barberry-Oreg Grape-Goldenseal (BERBERINE COMPLEX PO) Take 1 capsule by mouth Daily.    ProviderAnn Marie MD   Coenzyme Q10 (CoQ-10) 100 MG capsule Take 1 capsule by mouth Daily.    ProviderAnn Marie MD   estradiol (ESTRACE) 0.1 MG/GM vaginal cream Insert 2 g into the vagina 3 (Three) Times a Week. 7/17/24   Le Silverman APRN   famotidine (Pepcid) 20 MG tablet Take 1 tablet by mouth 2 (Two) Times a Day. 7/8/24   Yuni Sanchez APRN   Fenugreek 500 MG capsule Take 1 capsule by mouth Daily.    Provider  "MD Ann Marie   losartan (COZAAR) 100 MG tablet Take 1 tablet by mouth once daily 10/10/23   Yuni Sanchez APRN   magnesium oxide (MAG-OX) 400 MG tablet Take 1 tablet by mouth Daily.    Ann Marie Padgett MD   metFORMIN ER (GLUCOPHAGE-XR) 500 MG 24 hr tablet Take 1 tablet by mouth 2 (Two) Times a Day. 6/14/24   Ann Marie Padgett MD   metoprolol tartrate (LOPRESSOR) 25 MG tablet Take 1 tablet by mouth 2 (Two) Times a Day. 6/22/24   Yuni Sanchez APRN   sennosides-docusate (Senokot S) 8.6-50 MG per tablet Take 1 tablet by mouth 2 (Two) Times a Day. Take 1 tablet by mouth twice daily for 3 days, then decrease to 1 tablet once daily 8/1/24   Yuni Sanchez APRN   vitamin B-12 (CYANOCOBALAMIN) 1000 MCG tablet Take 1 tablet by mouth Daily. [place]    Ann Marie Padgett MD   vitamin D3 125 MCG (5000 UT) capsule capsule Take 1 capsule by mouth Daily.    ProviderAnn Marie MD     I have utilized all available immediate resources to obtain, update, or review the patient's current medications (including all prescriptions, over-the-counter products, herbals, cannabis/cannabidiol products, and vitamin/mineral/dietary (nutritional) supplements).    Objective     Vital Signs: /76   Pulse 85   Temp 98.1 °F (36.7 °C) (Oral)   Resp 20   Ht 152.4 cm (60\")   Wt 74.4 kg (164 lb)   SpO2 93%   BMI 32.03 kg/m²   Physical Exam  Vitals reviewed.   Constitutional:       Appearance: Normal appearance.   HENT:      Head: Normocephalic and atraumatic.      Right Ear: External ear normal.      Left Ear: External ear normal.      Nose: Nose normal.   Eyes:      General: No scleral icterus.     Conjunctiva/sclera: Conjunctivae normal.   Cardiovascular:      Rate and Rhythm: Normal rate and regular rhythm.      Heart sounds: Normal heart sounds.   Pulmonary:      Effort: Pulmonary effort is normal.      Breath sounds: Normal breath sounds.   Abdominal:      General: Bowel sounds are normal.      Palpations: " Abdomen is soft.   Musculoskeletal:         General: Swelling, tenderness, deformity and signs of injury present.      Cervical back: Normal range of motion and neck supple.      Comments: Left arm pain, with pain with range of motion.   Skin:     General: Skin is warm and dry.   Neurological:      Mental Status: She is alert and oriented to person, place, and time.      Cranial Nerves: No cranial nerve deficit.   Psychiatric:         Mood and Affect: Mood normal.         Behavior: Behavior normal.          Results Reviewed:  Lab Results (last 24 hours)       Procedure Component Value Units Date/Time    Comprehensive Metabolic Panel [[id number]]  (Abnormal) Collected: 08/18/24 0239    Specimen: Blood from Arm, Right Updated: 08/18/24 0318     Glucose 253 mg/dL      BUN 23 mg/dL      Creatinine 0.73 mg/dL      Sodium 136 mmol/L      Potassium 5.1 mmol/L      Comment: Specimen hemolyzed.  Result may be falsely elevated.        Chloride 100 mmol/L      CO2 23.0 mmol/L      Calcium 9.0 mg/dL      Total Protein 6.9 g/dL      Albumin 4.2 g/dL      ALT (SGPT) 17 U/L      Comment: Specimen hemolyzed.  Result may  be falsely elevated.        AST (SGOT) 41 U/L      Comment: Specimen hemolyzed.  Result may be falsely elevated.        Alkaline Phosphatase 156 U/L      Total Bilirubin 0.2 mg/dL      Globulin 2.7 gm/dL      A/G Ratio 1.6 g/dL      BUN/Creatinine Ratio 31.5     Anion Gap 13.0 mmol/L      eGFR 87.5 mL/min/1.73     Narrative:      GFR Normal >60  Chronic Kidney Disease <60  Kidney Failure <15    The GFR formula is only valid for adults with stable renal function between ages 18 and 70.    Protime-INR [[id number]]  (Abnormal) Collected: 08/18/24 0107    Specimen: Blood Updated: 08/18/24 0123     Protime 12.0 Seconds      INR 0.86    aPTT [[id number]]  (Normal) Collected: 08/18/24 0107    Specimen: Blood Updated: 08/18/24 0123     PTT 28.2 seconds     CBC & Differential [[id number]]  (Abnormal) Collected: 08/18/24 0107     Specimen: Blood Updated: 08/18/24 0114    Narrative:      The following orders were created for panel order CBC & Differential.  Procedure                               Abnormality         Status                     ---------                               -----------         ------                     CBC Auto Differential[[id number]]        Abnormal            Final result                 Please view results for these tests on the individual orders.    CBC Auto Differential [[id number]]  (Abnormal) Collected: 08/18/24 0107    Specimen: Blood Updated: 08/18/24 0114     WBC 10.39 10*3/mm3      RBC 4.36 10*6/mm3      Hemoglobin 14.1 g/dL      Hematocrit 39.3 %      MCV 90.1 fL      MCH 32.3 pg      MCHC 35.9 g/dL      RDW 13.1 %      RDW-SD 42.8 fl      MPV 10.0 fL      Platelets 274 10*3/mm3      Neutrophil % 77.1 %      Lymphocyte % 13.3 %      Monocyte % 7.4 %      Eosinophil % 1.4 %      Basophil % 0.4 %      Immature Grans % 0.4 %      Neutrophils, Absolute 8.01 10*3/mm3      Lymphocytes, Absolute 1.38 10*3/mm3      Monocytes, Absolute 0.77 10*3/mm3      Eosinophils, Absolute 0.15 10*3/mm3      Basophils, Absolute 0.04 10*3/mm3      Immature Grans, Absolute 0.04 10*3/mm3      nRBC 0.0 /100 WBC           Imaging Results (Last 24 Hours)       Procedure Component Value Units Date/Time    XR Shoulder 2+ View Left [[id number]] Resulted: 08/18/24 0155     Updated: 08/18/24 0155    XR Humerus Left [[id number]] Resulted: 08/18/24 0154     Updated: 08/18/24 0154    CT Head Without Contrast [[id number]] Resulted: 08/18/24 0134     Updated: 08/18/24 0146    CT Cervical Spine Without Contrast [[id number]] Resulted: 08/18/24 0134     Updated: 08/18/24 0146          I have personally reviewed and interpreted the radiology studies and ECG obtained at time of admission.     Assessment / Plan   Assessment:   Active Hospital Problems    Diagnosis     **Humeral shaft fracture      Impression:  Humeral shaft  fracture  Fall  Non-insulin-dependent diabetes with hyperglycemia  Hypertension    Treatment Plan  Admit to observation  Orthopedics consult  Pain control  Neurovascular checks  Fall precautions  N.p.o. status  Sliding-scale insulin with Accu-Cheks  Hydralazine IV as needed for systolic pressure greater than 160    The patient will be admitted to my service here at [facility].  Primary team to take over in the morning    Medical Decision Making  Number and Complexity of problems: 4, complex  Differential Diagnosis: Musculoskeletal strain    Conditions and Status        Condition is unchanged.     [facility] Data  External documents reviewed: None  Cardiac tracing (EKG, telemetry) interpretation: None  Radiology interpretation: None  Labs reviewed: Reviewed  Any tests that were considered but not ordered: None     Decision rules/scores evaluated (example [id number], [place], etc): None     Discussed with: Patient and family at bedside     Care Planning  Shared decision making: Patient, family, and ED staff  Code status and discussions: Full    Disposition  Social Determinants of Health that impact treatment or disposition: None  Estimated length of stay is 1 to 2 days.     I confirmed that the patient's advanced care plan is present, code status is documented, and a surrogate decision maker is listed in the patient's medical record.     The patient's surrogate decision maker is family.     The patient was seen and examined by me on 8/18/2024 at 4.    Electronically signed by Tanvi Zelaya DO, 08/18/24, 04:05 CDT.          \

## 2025-04-09 NOTE — ASSESSMENT & PLAN NOTE
Had 1 episode of diarrhea. Continue Florastar upon discharge, Discuss upon TCM visit if it should be continued

## 2025-04-10 ENCOUNTER — TRANSITIONAL CARE MANAGEMENT (OUTPATIENT)
Age: 65
End: 2025-04-10

## 2025-04-13 ENCOUNTER — HOSPITAL ENCOUNTER (INPATIENT)
Facility: HOSPITAL | Age: 65
LOS: 2 days | Discharge: HOME | DRG: 184 | End: 2025-04-16
Attending: STUDENT IN AN ORGANIZED HEALTH CARE EDUCATION/TRAINING PROGRAM | Admitting: INTERNAL MEDICINE
Payer: COMMERCIAL

## 2025-04-13 ENCOUNTER — APPOINTMENT (EMERGENCY)
Dept: RADIOLOGY | Facility: HOSPITAL | Age: 65
DRG: 184 | End: 2025-04-13
Payer: COMMERCIAL

## 2025-04-13 DIAGNOSIS — S31.829A OPEN WOUND OF LEFT BUTTOCK, INITIAL ENCOUNTER: ICD-10-CM

## 2025-04-13 DIAGNOSIS — I48.91 ATRIAL FIBRILLATION WITH RVR (HCC): ICD-10-CM
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
## 2025-04-13 DIAGNOSIS — E11.00 TYPE 2 DIABETES MELLITUS WITH HYPEROSMOLARITY WITHOUT COMA, WITHOUT LONG-TERM CURRENT USE OF INSULIN (HCC): Chronic | ICD-10-CM

## 2025-04-13 DIAGNOSIS — I10 ESSENTIAL (PRIMARY) HYPERTENSION: Chronic | ICD-10-CM
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
## 2025-04-13 DIAGNOSIS — K59.00 CONSTIPATION: ICD-10-CM

## 2025-04-13 DIAGNOSIS — R06.09 DYSPNEA ON EXERTION: ICD-10-CM

## 2025-04-13 DIAGNOSIS — S51.811A SKIN TEAR OF RIGHT FOREARM WITHOUT COMPLICATION, INITIAL ENCOUNTER: ICD-10-CM

## 2025-04-13 DIAGNOSIS — I48.0 PAROXYSMAL ATRIAL FIBRILLATION (HCC): ICD-10-CM

## 2025-04-13 DIAGNOSIS — F10.10 ALCOHOL ABUSE: Chronic | ICD-10-CM

## 2025-04-13 DIAGNOSIS — G95.19 EDEMA, SPINAL CORD (HCC): ICD-10-CM

## 2025-04-13 DIAGNOSIS — R79.89 ELEVATED SERUM CREATININE: ICD-10-CM

## 2025-04-13 DIAGNOSIS — Z85.46 HX OF PROSTATIC MALIGNANCY: ICD-10-CM

## 2025-04-13 DIAGNOSIS — F10.929 ALCOHOL INTOXICATION (HCC): Primary | ICD-10-CM

## 2025-04-13 DIAGNOSIS — I48.20 CHRONIC ATRIAL FIBRILLATION (HCC): ICD-10-CM

## 2025-04-13 DIAGNOSIS — R19.7 DIARRHEA, UNSPECIFIED TYPE: ICD-10-CM

## 2025-04-13 DIAGNOSIS — I48.91 ATRIAL FIBRILLATION WITH RAPID VENTRICULAR RESPONSE (HCC): ICD-10-CM

## 2025-04-13 DIAGNOSIS — S22.39XA RIB FRACTURE: ICD-10-CM

## 2025-04-13 DIAGNOSIS — K21.9 GASTROESOPHAGEAL REFLUX DISEASE, UNSPECIFIED WHETHER ESOPHAGITIS PRESENT: ICD-10-CM

## 2025-04-13 DIAGNOSIS — R29.6 FALLS: ICD-10-CM

## 2025-04-13 DIAGNOSIS — S31.819A OPEN WOUND OF RIGHT BUTTOCK, INITIAL ENCOUNTER: ICD-10-CM

## 2025-04-13 DIAGNOSIS — J44.9 COPD (CHRONIC OBSTRUCTIVE PULMONARY DISEASE) (HCC): ICD-10-CM

## 2025-04-13 DIAGNOSIS — E83.42 HYPOMAGNESEMIA: ICD-10-CM

## 2025-04-13 DIAGNOSIS — E87.1 HYPONATREMIA: ICD-10-CM

## 2025-04-13 DIAGNOSIS — Z72.0 NICOTINE ABUSE: Chronic | ICD-10-CM

## 2025-04-13 DIAGNOSIS — I25.10 CAD (CORONARY ARTERY DISEASE): Chronic | ICD-10-CM

## 2025-04-13 PROBLEM — S22.49XA FRACTURE OF MULTIPLE RIBS: Status: ACTIVE | Noted: 2025-04-13

## 2025-04-13 PROBLEM — S31.809A OPEN WOUND OF BUTTOCK: Status: ACTIVE | Noted: 2025-04-13

## 2025-04-13 LAB
ANION GAP SERPL CALCULATED.3IONS-SCNC: 16 MMOL/L (ref 4–13)
BASOPHILS # BLD AUTO: 0.08 THOUSANDS/ÂΜL (ref 0–0.1)
BASOPHILS NFR BLD AUTO: 1 % (ref 0–1)
BUN SERPL-MCNC: 13 MG/DL (ref 5–25)
CALCIUM SERPL-MCNC: 8 MG/DL (ref 8.4–10.2)
CHLORIDE SERPL-SCNC: 94 MMOL/L (ref 96–108)
CK SERPL-CCNC: 190 U/L (ref 39–308)
CO2 SERPL-SCNC: 23 MMOL/L (ref 21–32)
CREAT SERPL-MCNC: 0.9 MG/DL (ref 0.6–1.3)
EOSINOPHIL # BLD AUTO: 0.05 THOUSAND/ÂΜL (ref 0–0.61)
EOSINOPHIL NFR BLD AUTO: 1 % (ref 0–6)
ERYTHROCYTE [DISTWIDTH] IN BLOOD BY AUTOMATED COUNT: 20 % (ref 11.6–15.1)
ETHANOL SERPL-MCNC: 182 MG/DL
GFR SERPL CREATININE-BSD FRML MDRD: 90 ML/MIN/1.73SQ M
GLUCOSE SERPL-MCNC: 110 MG/DL (ref 65–140)
GLUCOSE SERPL-MCNC: 115 MG/DL (ref 65–140)
GLUCOSE SERPL-MCNC: 117 MG/DL (ref 65–140)
HCT VFR BLD AUTO: 33.7 % (ref 36.5–49.3)
HGB BLD-MCNC: 11.2 G/DL (ref 12–17)
IMM GRANULOCYTES # BLD AUTO: 0.02 THOUSAND/UL (ref 0–0.2)
IMM GRANULOCYTES NFR BLD AUTO: 0 % (ref 0–2)
LYMPHOCYTES # BLD AUTO: 1.43 THOUSANDS/ÂΜL (ref 0.6–4.47)
LYMPHOCYTES NFR BLD AUTO: 17 % (ref 14–44)
MAGNESIUM SERPL-MCNC: 0.9 MG/DL (ref 1.9–2.7)
MCH RBC QN AUTO: 29.9 PG (ref 26.8–34.3)
MCHC RBC AUTO-ENTMCNC: 33.2 G/DL (ref 31.4–37.4)
MCV RBC AUTO: 90 FL (ref 82–98)
MONOCYTES # BLD AUTO: 1.75 THOUSAND/ÂΜL (ref 0.17–1.22)
MONOCYTES NFR BLD AUTO: 21 % (ref 4–12)
NEUTROPHILS # BLD AUTO: 4.96 THOUSANDS/ÂΜL (ref 1.85–7.62)
NEUTS SEG NFR BLD AUTO: 60 % (ref 43–75)
NRBC BLD AUTO-RTO: 0 /100 WBCS
PLATELET # BLD AUTO: 239 THOUSANDS/UL (ref 149–390)
PMV BLD AUTO: 9.9 FL (ref 8.9–12.7)
POTASSIUM SERPL-SCNC: 3.5 MMOL/L (ref 3.5–5.3)
RBC # BLD AUTO: 3.74 MILLION/UL (ref 3.88–5.62)
SODIUM SERPL-SCNC: 133 MMOL/L (ref 135–147)
WBC # BLD AUTO: 8.29 THOUSAND/UL (ref 4.31–10.16)

## 2025-04-13 PROCEDURE — 83735 ASSAY OF MAGNESIUM: CPT | Performed by: STUDENT IN AN ORGANIZED HEALTH CARE EDUCATION/TRAINING PROGRAM

## 2025-04-13 PROCEDURE — 96374 THER/PROPH/DIAG INJ IV PUSH: CPT

## 2025-04-13 PROCEDURE — 85025 COMPLETE CBC W/AUTO DIFF WBC: CPT | Performed by: STUDENT IN AN ORGANIZED HEALTH CARE EDUCATION/TRAINING PROGRAM

## 2025-04-13 PROCEDURE — 96368 THER/DIAG CONCURRENT INF: CPT

## 2025-04-13 PROCEDURE — 93005 ELECTROCARDIOGRAM TRACING: CPT

## 2025-04-13 PROCEDURE — 96366 THER/PROPH/DIAG IV INF ADDON: CPT

## 2025-04-13 PROCEDURE — 72125 CT NECK SPINE W/O DYE: CPT

## 2025-04-13 PROCEDURE — 96361 HYDRATE IV INFUSION ADD-ON: CPT

## 2025-04-13 PROCEDURE — 80048 BASIC METABOLIC PNL TOTAL CA: CPT | Performed by: STUDENT IN AN ORGANIZED HEALTH CARE EDUCATION/TRAINING PROGRAM

## 2025-04-13 PROCEDURE — 99284 EMERGENCY DEPT VISIT MOD MDM: CPT

## 2025-04-13 PROCEDURE — 82948 REAGENT STRIP/BLOOD GLUCOSE: CPT

## 2025-04-13 PROCEDURE — 87040 BLOOD CULTURE FOR BACTERIA: CPT

## 2025-04-13 PROCEDURE — 82550 ASSAY OF CK (CPK): CPT

## 2025-04-13 PROCEDURE — 71260 CT THORAX DX C+: CPT

## 2025-04-13 PROCEDURE — 96365 THER/PROPH/DIAG IV INF INIT: CPT

## 2025-04-13 PROCEDURE — 96375 TX/PRO/DX INJ NEW DRUG ADDON: CPT

## 2025-04-13 PROCEDURE — 70450 CT HEAD/BRAIN W/O DYE: CPT

## 2025-04-13 PROCEDURE — 74177 CT ABD & PELVIS W/CONTRAST: CPT

## 2025-04-13 PROCEDURE — 80307 DRUG TEST PRSMV CHEM ANLYZR: CPT

## 2025-04-13 PROCEDURE — 99285 EMERGENCY DEPT VISIT HI MDM: CPT | Performed by: STUDENT IN AN ORGANIZED HEALTH CARE EDUCATION/TRAINING PROGRAM

## 2025-04-13 PROCEDURE — 36415 COLL VENOUS BLD VENIPUNCTURE: CPT | Performed by: STUDENT IN AN ORGANIZED HEALTH CARE EDUCATION/TRAINING PROGRAM

## 2025-04-13 PROCEDURE — 82077 ASSAY SPEC XCP UR&BREATH IA: CPT | Performed by: STUDENT IN AN ORGANIZED HEALTH CARE EDUCATION/TRAINING PROGRAM

## 2025-04-13 RX ORDER — LANOLIN ALCOHOL/MO/W.PET/CERES
800 CREAM (GRAM) TOPICAL 2 TIMES DAILY
Status: DISCONTINUED | OUTPATIENT
Start: 2025-04-13 | End: 2025-04-16 | Stop reason: HOSPADM

## 2025-04-13 RX ORDER — PANTOPRAZOLE SODIUM 40 MG/1
40 TABLET, DELAYED RELEASE ORAL
Status: DISCONTINUED | OUTPATIENT
Start: 2025-04-14 | End: 2025-04-16 | Stop reason: HOSPADM

## 2025-04-13 RX ORDER — MAGNESIUM SULFATE HEPTAHYDRATE 40 MG/ML
2 INJECTION, SOLUTION INTRAVENOUS ONCE
Status: COMPLETED | OUTPATIENT
Start: 2025-04-13 | End: 2025-04-13

## 2025-04-13 RX ORDER — TAMSULOSIN HYDROCHLORIDE 0.4 MG/1
0.4 CAPSULE ORAL DAILY
Status: DISCONTINUED | OUTPATIENT
Start: 2025-04-14 | End: 2025-04-15

## 2025-04-13 RX ORDER — ASPIRIN 81 MG/1
81 TABLET, CHEWABLE ORAL DAILY
Status: DISCONTINUED | OUTPATIENT
Start: 2025-04-14 | End: 2025-04-16 | Stop reason: HOSPADM

## 2025-04-13 RX ORDER — RANOLAZINE 500 MG/1
500 TABLET, EXTENDED RELEASE ORAL EVERY 12 HOURS
Status: DISCONTINUED | OUTPATIENT
Start: 2025-04-13 | End: 2025-04-16 | Stop reason: HOSPADM

## 2025-04-13 RX ORDER — OXYCODONE HYDROCHLORIDE 5 MG/1
5 TABLET ORAL EVERY 4 HOURS PRN
Status: DISCONTINUED | OUTPATIENT
Start: 2025-04-13 | End: 2025-04-16 | Stop reason: HOSPADM

## 2025-04-13 RX ORDER — ACETAMINOPHEN 325 MG/1
650 TABLET ORAL EVERY 4 HOURS PRN
Status: DISCONTINUED | OUTPATIENT
Start: 2025-04-13 | End: 2025-04-14

## 2025-04-13 RX ORDER — INSULIN LISPRO 100 [IU]/ML
1-6 INJECTION, SOLUTION INTRAVENOUS; SUBCUTANEOUS
Status: DISCONTINUED | OUTPATIENT
Start: 2025-04-14 | End: 2025-04-13

## 2025-04-13 RX ORDER — AMOXICILLIN 250 MG
1 CAPSULE ORAL DAILY PRN
Status: DISCONTINUED | OUTPATIENT
Start: 2025-04-13 | End: 2025-04-16 | Stop reason: HOSPADM

## 2025-04-13 RX ORDER — ALBUTEROL SULFATE 90 UG/1
2 INHALANT RESPIRATORY (INHALATION) EVERY 4 HOURS PRN
Status: DISCONTINUED | OUTPATIENT
Start: 2025-04-13 | End: 2025-04-16 | Stop reason: HOSPADM

## 2025-04-13 RX ORDER — CEPHALEXIN 500 MG/1
500 CAPSULE ORAL EVERY 6 HOURS SCHEDULED
Status: DISCONTINUED | OUTPATIENT
Start: 2025-04-14 | End: 2025-04-13

## 2025-04-13 RX ORDER — FOLIC ACID 1 MG/1
1000 TABLET ORAL DAILY
Status: DISCONTINUED | OUTPATIENT
Start: 2025-04-14 | End: 2025-04-16 | Stop reason: HOSPADM

## 2025-04-13 RX ORDER — FERROUS SULFATE 325(65) MG
325 TABLET ORAL
Status: DISCONTINUED | OUTPATIENT
Start: 2025-04-14 | End: 2025-04-16 | Stop reason: HOSPADM

## 2025-04-13 RX ORDER — DIGOXIN 0.25 MG/ML
125 INJECTION INTRAMUSCULAR; INTRAVENOUS ONCE
Status: COMPLETED | OUTPATIENT
Start: 2025-04-13 | End: 2025-04-13

## 2025-04-13 RX ORDER — INSULIN LISPRO 100 [IU]/ML
1-5 INJECTION, SOLUTION INTRAVENOUS; SUBCUTANEOUS
Status: DISCONTINUED | OUTPATIENT
Start: 2025-04-14 | End: 2025-04-16 | Stop reason: HOSPADM

## 2025-04-13 RX ORDER — POTASSIUM CHLORIDE 14.9 MG/ML
20 INJECTION INTRAVENOUS
Status: COMPLETED | OUTPATIENT
Start: 2025-04-14 | End: 2025-04-14

## 2025-04-13 RX ORDER — NICOTINE 21 MG/24HR
1 PATCH, TRANSDERMAL 24 HOURS TRANSDERMAL DAILY
Status: DISCONTINUED | OUTPATIENT
Start: 2025-04-14 | End: 2025-04-16 | Stop reason: HOSPADM

## 2025-04-13 RX ORDER — LANOLIN ALCOHOL/MO/W.PET/CERES
100 CREAM (GRAM) TOPICAL DAILY
Status: DISCONTINUED | OUTPATIENT
Start: 2025-04-14 | End: 2025-04-16 | Stop reason: HOSPADM

## 2025-04-13 RX ORDER — SODIUM CHLORIDE, SODIUM LACTATE, POTASSIUM CHLORIDE, CALCIUM CHLORIDE 600; 310; 30; 20 MG/100ML; MG/100ML; MG/100ML; MG/100ML
75 INJECTION, SOLUTION INTRAVENOUS CONTINUOUS
Status: DISCONTINUED | OUTPATIENT
Start: 2025-04-13 | End: 2025-04-16

## 2025-04-13 RX ORDER — SACCHAROMYCES BOULARDII 250 MG
250 CAPSULE ORAL 2 TIMES DAILY
Status: DISCONTINUED | OUTPATIENT
Start: 2025-04-13 | End: 2025-04-16 | Stop reason: HOSPADM

## 2025-04-13 RX ORDER — GABAPENTIN 300 MG/1
300 CAPSULE ORAL 3 TIMES DAILY
Status: DISCONTINUED | OUTPATIENT
Start: 2025-04-13 | End: 2025-04-16 | Stop reason: HOSPADM

## 2025-04-13 RX ORDER — MAGNESIUM SULFATE HEPTAHYDRATE 40 MG/ML
4 INJECTION, SOLUTION INTRAVENOUS ONCE
Status: COMPLETED | OUTPATIENT
Start: 2025-04-13 | End: 2025-04-14

## 2025-04-13 RX ORDER — CEFAZOLIN SODIUM 2 G/50ML
2000 SOLUTION INTRAVENOUS EVERY 8 HOURS
Status: DISCONTINUED | OUTPATIENT
Start: 2025-04-13 | End: 2025-04-14

## 2025-04-13 RX ORDER — INSULIN LISPRO 100 [IU]/ML
1-5 INJECTION, SOLUTION INTRAVENOUS; SUBCUTANEOUS
Status: DISCONTINUED | OUTPATIENT
Start: 2025-04-13 | End: 2025-04-16 | Stop reason: HOSPADM

## 2025-04-13 RX ORDER — DILTIAZEM HYDROCHLORIDE 60 MG/1
120 CAPSULE, EXTENDED RELEASE ORAL EVERY 12 HOURS SCHEDULED
Status: DISCONTINUED | OUTPATIENT
Start: 2025-04-13 | End: 2025-04-16 | Stop reason: HOSPADM

## 2025-04-13 RX ORDER — MAGNESIUM HYDROXIDE/ALUMINUM HYDROXICE/SIMETHICONE 120; 1200; 1200 MG/30ML; MG/30ML; MG/30ML
30 SUSPENSION ORAL EVERY 4 HOURS PRN
Status: DISCONTINUED | OUTPATIENT
Start: 2025-04-13 | End: 2025-04-16 | Stop reason: HOSPADM

## 2025-04-13 RX ORDER — FLUTICASONE FUROATE AND VILANTEROL 200; 25 UG/1; UG/1
1 POWDER RESPIRATORY (INHALATION) DAILY
Status: DISCONTINUED | OUTPATIENT
Start: 2025-04-14 | End: 2025-04-16 | Stop reason: HOSPADM

## 2025-04-13 RX ADMIN — FOLIC ACID 1 MG: 5 INJECTION, SOLUTION INTRAMUSCULAR; INTRAVENOUS; SUBCUTANEOUS at 15:00

## 2025-04-13 RX ADMIN — CEFAZOLIN SODIUM 2000 MG: 2 SOLUTION INTRAVENOUS at 23:07

## 2025-04-13 RX ADMIN — Medication 250 MG: at 22:01

## 2025-04-13 RX ADMIN — DEXTROSE AND SODIUM CHLORIDE 1000 ML: 5; .9 INJECTION, SOLUTION INTRAVENOUS at 14:05

## 2025-04-13 RX ADMIN — METOPROLOL TARTRATE 150 MG: 100 TABLET, FILM COATED ORAL at 23:07

## 2025-04-13 RX ADMIN — THIAMINE HYDROCHLORIDE 100 MG: 100 INJECTION, SOLUTION INTRAMUSCULAR; INTRAVENOUS at 15:09

## 2025-04-13 RX ADMIN — IOHEXOL 100 ML: 350 INJECTION, SOLUTION INTRAVENOUS at 14:53

## 2025-04-13 RX ADMIN — DIGOXIN 125 MCG: 0.25 INJECTION INTRAMUSCULAR; INTRAVENOUS at 20:09

## 2025-04-13 RX ADMIN — SODIUM CHLORIDE 1000 ML: 0.9 INJECTION, SOLUTION INTRAVENOUS at 13:29

## 2025-04-13 RX ADMIN — GABAPENTIN 300 MG: 300 CAPSULE ORAL at 22:02

## 2025-04-13 RX ADMIN — SODIUM CHLORIDE, SODIUM LACTATE, POTASSIUM CHLORIDE, AND CALCIUM CHLORIDE 100 ML/HR: .6; .31; .03; .02 INJECTION, SOLUTION INTRAVENOUS at 22:02

## 2025-04-13 RX ADMIN — APIXABAN 5 MG: 5 TABLET, FILM COATED ORAL at 22:01

## 2025-04-13 RX ADMIN — MAGNESIUM SULFATE HEPTAHYDRATE 2 G: 40 INJECTION, SOLUTION INTRAVENOUS at 20:08

## 2025-04-13 NOTE — ED PROVIDER NOTES
Time reflects when diagnosis was documented in both MDM as applicable and the Disposition within this note       Time User Action Codes Description Comment    4/13/2025  7:56 PM Gilberto North Add [F10.929] Alcohol intoxication (HCC)     4/13/2025  7:56 PM Gilberto North Add [I48.91] Atrial fibrillation with RVR (HCC)     4/13/2025  7:56 PM Gilberto North Add [S22.39XA] Rib fracture     4/13/2025  8:24 PM Nailachidiandrea Nicky Add [I48.91] Atrial fibrillation with rapid ventricular response (HCC)           ED Disposition       ED Disposition   Admit    Condition   Stable    Date/Time   Sun Apr 13, 2025  8:27 PM    Comment   Case was discussed with Иван and the patient's admission status was agreed to be Admission Status: observation status to the service of Dr. Thompson .               Assessment & Plan       Medical Decision Making  Patient is a 65 y.o. male who presents to the ED for EtOH, fall.  Patient is nontoxic, well-appearing.     Differential includes but is not limited to:ETOH intoxication, intracranial bleeding, intrathoracic injury, intraabdominal injury    Plan: CTH, CT C spine, CT CAP, observation                 Amount and/or Complexity of Data Reviewed  Labs: ordered. Decision-making details documented in ED Course.  Radiology: ordered.    Risk  Prescription drug management.  Decision regarding hospitalization.        ED Course as of 04/13/25 2027   Sun Apr 13, 2025   1338 CBC and differential(!)  Stable   1656 Patient resting comfortably, sleeping.  Will continue to monitor.   1711 Again reevaluated.  Discussed rib fractures.  Patient in no pain.  Does not want to be transferred.  Will continue to monitor.   2007 Patient noted to be in A-fib with RVR.  Will give digoxin (blood pressure drops when given metoprolol and/or Cardizem).  Will admit       Medications   magnesium sulfate 2 g/50 mL IVPB (premix) 2 g (2 g Intravenous New Bag 4/13/25 2008)   sodium chloride 0.9 % bolus 1,000 mL (0 mL  Intravenous Stopped 4/13/25 1405)   dextrose 5 % and sodium chloride 0.9 % bolus 1,000 mL (0 mL Intravenous Stopped 4/13/25 1943)   thiamine (VITAMIN B1) 100 mg in sodium chloride 0.9 % 50 mL IVPB (0 mg Intravenous Stopped 4/13/25 1632)   folic acid 1 mg in sodium chloride 0.9 % 50 mL IVPB (0 mg Intravenous Stopped 4/13/25 1632)   iohexol (OMNIPAQUE) 350 MG/ML injection (MULTI-DOSE) 100 mL (100 mL Intravenous Given 4/13/25 1453)   digoxin (LANOXIN) injection 125 mcg (125 mcg Intravenous Given 4/13/25 2009)       ED Risk Strat Scores                 CIWA-Ar Score       Row Name 04/13/25 1944             CIWA-Ar    Blood Pressure 140/82      Pulse 122      Nausea and Vomiting 0      Tactile Disturbances 0      Tremor 2      Auditory Disturbances 0      Paroxysmal Sweats 2      Visual Disturbances 0      Anxiety 0      Headache, Fullness in Head 0      Agitation 0      Orientation and Clouding of Sensorium 0      CIWA-Ar Total 4                      No data recorded        SBIRT 22yo+      Flowsheet Row Most Recent Value   Initial Alcohol Screen: US AUDIT-C     1. How often do you have a drink containing alcohol? 6 Filed at: 04/13/2025 1336   2. How many drinks containing alcohol do you have on a typical day you are drinking?  4 Filed at: 04/13/2025 1336   3a. Male UNDER 65: How often do you have five or more drinks on one occasion? 4 Filed at: 04/13/2025 1336   3b. FEMALE Any Age, or MALE 65+: How often do you have 4 or more drinks on one occassion? 0 Filed at: 04/13/2025 1336   Audit-C Score 14 Filed at: 04/13/2025 1336   Full Alcohol Screen: US AUDIT    4. How often during the last year have you found that you were not able to stop drinking once you had started? 0 Filed at: 04/13/2025 1336   5. How often during past year have you failed to do what was normally expected of you because of drinking?  0 Filed at: 04/13/2025 1336   6. How often in past year have you needed a first drink in the morning to get yourself  "going after a heavy drinking session?  0 Filed at: 04/13/2025 5139   7. How often in past year have you had feeling of guilt or remorse after drinking?  0 Filed at: 04/13/2025 6226   8. How often in past year have you been unable to remember what happened night before because you had been drinking?  0 Filed at: 04/13/2025 8027   9. Have you or someone else been injured as a result of your drinking?  0 Filed at: 04/13/2025 7809   10. Has a relative, friend, doctor or other health worker been concerned about your drinking and suggested you cut down?  0 Filed at: 04/13/2025 1331   AUDIT Total Score 14 Filed at: 04/13/2025 6841   BRIGIDA: How many times in the past year have you...    Used an illegal drug or used a prescription medication for non-medical reasons? Never Filed at: 04/13/2025 5452                            History of Present Illness       Chief Complaint   Patient presents with    Fall    Alcohol Intoxication     Per EMS, pt fell from standing. Pt states he drank \"a small amount\" of vodka PTA. Hx of alcohol use. Denies pain. A&Ox4.       Past Medical History:   Diagnosis Date    Acute on chronic kidney failure  (HCC)     Alcohol abuse     Alcohol withdrawal (HCC) 06/07/2019    Atrial fibrillation (HCC)     Cancer (HCC)     prostate ca,had radiation    Cardiac disease     stents,then triple bypass    COPD (chronic obstructive pulmonary disease) (HCC)     Coronary artery disease     ETOH abuse     Heart failure (HCC)     History of heart surgery     says [facility] bypass [facility]    Hx of heart artery stent     2014    Hyperlipidemia     Hypertension     Hyponatremia 10/17/2019    Hypovolemic shock (HCC) 12/22/2019    Lumbar spondylitis (HCC) 10/13/2022    Metabolic acidosis, increased anion gap 04/21/2021    Nasal bone fracture 10/10/2022    Prostate CA (HCC)     S/P CABG x 3     2004    Sleep apnea       Past Surgical History:   Procedure Laterality Date    CARDIAC CATHETERIZATION      2 stents    " CORONARY ARTERY BYPASS GRAFT      CORONARY ARTERY BYPASS GRAFT  2004    IN ARTHRD ANT INTERBODY MIN DSC CRV BELOW C2 N/A 12/16/2020    Procedure: Anterior cervical discectomy with fusion C4-C7; Posterior cervical decompression and fusion C2-T2;  Surgeon: David Rowell MD;  Location: [facility];  Service: Neurosurgery    TONSILLECTOMY        Family History   Problem Relation Age of Onset    Diabetes Mother     Uterine cancer Mother     COPD Father     Hypertension Father       Social History     Tobacco Use    Smoking status: Every Day     Current packs/day: 1.50     Average packs/day: 1.5 packs/day for 40.0 years (60.0 ttl pk-yrs)     Types: Cigarettes    Smokeless tobacco: Never   Vaping Use    Vaping status: Never Used   Substance Use Topics    Alcohol use: Yes     Alcohol/week: 4.0 standard drinks of alcohol     Types: 4 Standard drinks or equivalent per week     Comment: quart of vodka daily    Drug use: No      E-Cigarette/Vaping    E-Cigarette Use Never User       E-Cigarette/Vaping Substances    Nicotine Yes     THC No     CBD No     Flavoring No     Other No     Unknown No       I have reviewed and agree with the history as documented.     65-year-old male, past medical history including alcohol abuse, with multiple visits to the emergency department, who presents to the emergency room for alcohol intoxication, status post fall.  Unclear conditions surrounding fall.  Currently has no complaints.  Does admit to drinking prior to arrival (vodka).  Is on AC.  No other complaints or concerns.      Fall  Alcohol Intoxication      Review of Systems   Skin:  Positive for wound.   All other systems reviewed and are negative.          Objective       ED Triage Vitals [04/13/25 1318]   Temperature Pulse Blood Pressure Respirations SpO2 Patient Position - Orthostatic VS   98.7 °F (37.1 °C) 105 135/79 18 94 % Lying      Temp Source Heart Rate Source BP Location FiO2 (%) Pain Score    Tympanic Monitor Left arm -- --       Vitals      Date and Time Temp Pulse SpO2 Resp BP Pain Score FACES Pain Rating User   04/13/25 2015 -- 109 97 % 18 126/78 -- -- MB   04/13/25 1944 -- 122 -- -- 140/82 -- -- MB   04/13/25 1943 -- 122 94 % 18 140/82 -- -- MB   04/13/25 1318 98.7 °F (37.1 °C) 105 94 % 18 135/79 -- -- ATG            Physical Exam  Vitals and nursing note reviewed.   Constitutional:       General: He is not in acute distress.     Appearance: He is well-developed. He is not ill-appearing, toxic-appearing or diaphoretic.   HENT:      Head: Normocephalic and atraumatic.      Right Ear: External ear normal.      Left Ear: External ear normal.      Nose: Nose normal.   Eyes:      General: Lids are normal. No scleral icterus.  Cardiovascular:      Rate and Rhythm: Normal rate and regular rhythm.      Heart sounds: Normal heart sounds. No murmur heard.     No friction rub. No gallop.   Pulmonary:      Effort: Pulmonary effort is normal. No respiratory distress.      Breath sounds: Normal breath sounds. No wheezing or rales.   Abdominal:      Palpations: Abdomen is soft.      Tenderness: There is no abdominal tenderness. There is no guarding or rebound.   Musculoskeletal:         General: No deformity. Normal range of motion.      Cervical back: Normal range of motion and neck supple.   Skin:     General: Skin is warm and dry.          Neurological:      General: No focal deficit present.      Mental Status: He is alert.   Psychiatric:         Mood and Affect: Mood normal.         Behavior: Behavior normal.         Results Reviewed       Procedure Component Value Units Date/Time    Magnesium [[id number]] Collected: 04/13/25 2009    Lab Status: In process Specimen: Blood from Arm, Left Updated: 04/13/25 2013    Ethanol [[id number]] Collected: 04/13/25 2009    Lab Status: In process Specimen: Blood from Arm, Left Updated: 04/13/25 2013    CK [[id number]]     Lab Status: No result Specimen: Blood     Rapid drug screen, urine [[id number]]     Lab  Status: No result Specimen: Urine     Basic metabolic panel [[id number]]  (Abnormal) Collected: 04/13/25 1329    Lab Status: Final result Specimen: Blood from Arm, Left Updated: 04/13/25 1357     Sodium 133 mmol/L      Potassium 3.5 mmol/L      Chloride 94 mmol/L      CO2 23 mmol/L      ANION GAP 16 mmol/L      BUN 13 mg/dL      Creatinine 0.90 mg/dL      Glucose 110 mg/dL      Calcium 8.0 mg/dL      eGFR 90 ml/min/1.73sq m     Narrative:      National Kid[employer] Foundation guidelines for Chronic Kidney Disease (CKD):     Stage 1 with normal or high GFR (GFR > 90 mL/min/1.73 square meters)    Stage 2 Mild CKD (GFR = 60-89 mL/min/1.73 square meters)    Stage 3A Moderate CKD (GFR = 45-59 mL/min/1.73 square meters)    Stage 3B Moderate CKD (GFR = 30-44 mL/min/1.73 square meters)    Stage 4 Severe CKD (GFR = 15-29 mL/min/1.73 square meters)    Stage 5 End Stage CKD (GFR <15 mL/min/1.73 square meters)  Note: GFR calculation is accurate only with a steady state creatinine    CBC and differential [[id number]]  (Abnormal) Collected: 04/13/25 1329    Lab Status: Final result Specimen: Blood from Arm, Left Updated: 04/13/25 1335     WBC 8.29 Thousand/uL      RBC 3.74 Million/uL      Hemoglobin 11.2 g/dL      Hematocrit 33.7 %      MCV 90 fL      MCH 29.9 pg      MCHC 33.2 g/dL      RDW 20.0 %      MPV 9.9 fL      Platelets 239 Thousands/uL      nRBC 0 /100 WBCs      Segmented % 60 %      Immature Grans % 0 %      Lymphocytes % 17 %      Monocytes % 21 %      Eosinophils Relative 1 %      Basophils Relative 1 %      Absolute Neutrophils 4.96 Thousands/µL      Absolute Immature Grans 0.02 Thousand/uL      Absolute Lymphocytes 1.43 Thousands/µL      Absolute Monocytes 1.75 Thousand/µL      Eosinophils Absolute 0.05 Thousand/µL      Basophils Absolute 0.08 Thousands/µL             CT head without contrast   Final Interpretation by Chris Lugo MD (04/13 9027)      No interval change. No acute intracranial hemorrhage or depressed  calvarial fracture identified                  Workstation performed: [id number]         CT spine cervical without contrast   Final Interpretation by Chris Lugo MD (04/13 1602)      No interval change. No acute fracture or evidence for traumatic malalignment.                  Workstation performed: [id number]         CT chest abdomen pelvis w contrast   Final Interpretation by Chris Lugo MD (04/13 1616)      Acute left 10th through 12th rib fractures, as described above. Bilateral healing rib fractures are otherwise stable since the prior exam. No pneumothorax.      No evidence for acute intra-abdominal or pelvic visceral organ injury.      Remainder of the findings, as described above.      The study was marked in EPIC for immediate notification.         Workstation performed: [id number]             ECG 12 Lead Documentation Only    Date/Time: 4/13/2025 10:49 PM    Performed by: Gilberto North DO  Authorized by: Gilberto North DO    ECG reviewed by me, the ED Provider: yes    Patient location:  ED  Interpretation:     Interpretation: abnormal    Rate:     ECG rate:  127    ECG rate assessment: normal    Rhythm:     Rhythm: sinus rhythm    Ectopy:     Ectopy: none    QRS:     QRS axis:  Normal  Conduction:     Conduction: normal    ST segments:     ST segments:  Normal  T waves:     T waves: normal    Other findings:     Other findings: LVH with strain        ED Medication and Procedure Management   Prior to Admission Medications   Prescriptions Last Dose Informant Patient Reported? Taking?   Blood Glucose Monitoring Suppl (ONE TOUCH ULTRA MINI) w/Device KIT  Self Yes No   Sig: Use as directed   Blood Pressure Monitoring (Adult Blood Pressure Cuff Lg) KIT   No No   Sig: Use in the morning   Breo Ellipta 200-25 MCG/ACT inhaler  Self No No   Sig: Inhale 1 puff daily Rinse mouth after use.   ONETOUCH DELICA LANCETS FINE MISC  Self Yes No   Sig: 3 (three) times a day Test   Thiamine HCl (vitamin B-1) 100 MG TABS   Self No No   Sig: TAKE 1 TABLET DAILY   albuterol (Ventolin HFA) 90 mcg/act inhaler  Self No No   Sig: Inhale 2 puffs every 4 (four) hours as needed for wheezing   aluminum-magnesium hydroxide-simethicone (MAALOX) 2576-6700-531 mg/30 mL suspension   No No   Sig: Take 30 mL by mouth every 4 (four) hours as needed for indigestion or heartburn   apixaban (Eliquis) 5 mg   No No   Sig: TAKE 1 TABLET BY MOUTH 2 TIMES A DAY DO NOT START BEFORE JANUARY 31, 2024.   aspirin (ECOTRIN LOW STRENGTH) 81 mg EC tablet  Self Yes No   Sig: Take 81 mg by mouth daily   diltiazem (CARDIZEM SR) 120 mg 12 hr capsule   No No   Sig: Take 1 capsule (120 mg total) by mouth every 12 (twelve) hours   ferrous sulfate 325 (65 Fe) mg tablet  Self No No   Sig: Take 1 tablet (325 mg total) by mouth daily with breakfast   folic acid (FOLVITE) 1 mg tablet   No No   Sig: TAKE 1 TABLET DAILY   gabapentin (NEURONTIN) 300 mg capsule   No No   Sig: TAKE ONE CAPSULE THREE TIMES DAILY   lidocaine (Lidoderm) 5 %   No No   Sig: Apply 1 patch topically over 12 hours daily Remove & Discard patch within 12 hours or as directed by MD   magnesium Oxide (MAG-OX) 400 mg TABS   No No   Sig: Take 2 tablets (800 mg total) by mouth 2 (two) times a day   metFORMIN (GLUCOPHAGE) 500 mg tablet   No No   Sig: TAKE 1 TABLET DAILY WITH BREAKFAST   metoprolol tartrate (LOPRESSOR) 100 mg tablet   No No   Sig: Take 1.5 tablets (150 mg total) by mouth every 12 (twelve) hours   nicotine (NICODERM CQ) 21 mg/24 hr TD 24 hr patch  Self No No   Sig: Place 1 patch on the skin over 24 hours daily Do not start before December 4, 2023.   pantoprazole (PROTONIX) 40 mg tablet   No No   Sig: TAKE 1 TABLET TWO TIMES A DAY   ranolazine (RANEXA) 500 mg 12 hr tablet   No No   Sig: TAKE 1 TABLET EVERY 12 HOURS   saccharomyces boulardii (FLORASTOR) 250 mg capsule   No No   Sig: Take 1 capsule (250 mg total) by mouth 2 (two) times a day   senna-docusate sodium (SENOKOT S) 8.6-50 mg per tablet   No No    Sig: Take 1 tablet by mouth daily as needed for constipation   tamsulosin (FLOMAX) 0.4 mg   No No   Sig: TAKE 1 CAPSULE DAILY      Facility-Administered Medications: None     Patient's Medications   Discharge Prescriptions    No medications on file     No discharge procedures on file.  ED SEPSIS DOCUMENTATION   Time reflects when diagnosis was documented in both MDM as applicable and the Disposition within this note       Time User Action Codes Description Comment    4/13/2025  7:56 PM Gilberto North [F10.929] Alcohol intoxication ([facility])     4/13/2025  7:56 PM Gilberto North [I48.91] Atrial fibrillation with RVR ([facility])     4/13/2025  7:56 PM Gilberto North [S22.39XA] Rib fracture     4/13/2025  8:24 PM Nicky Messer Add [I48.91] Atrial fibrillation with rapid ventricular response (HCC)                  Gilberto North, DO  04/13/25 2027       Gilberto North,   04/13/25 1116

## 2025-04-14 LAB
ALBUMIN SERPL BCG-MCNC: 3 G/DL (ref 3.5–5)
ALP SERPL-CCNC: 87 U/L (ref 34–104)
ALT SERPL W P-5'-P-CCNC: 27 U/L (ref 7–52)
AMPHETAMINES SERPL QL SCN: NEGATIVE
ANION GAP SERPL CALCULATED.3IONS-SCNC: 12 MMOL/L (ref 4–13)
AST SERPL W P-5'-P-CCNC: 26 U/L (ref 13–39)
BARBITURATES UR QL: NEGATIVE
BASOPHILS # BLD AUTO: 0.08 THOUSANDS/ÂΜL (ref 0–0.1)
BASOPHILS NFR BLD AUTO: 1 % (ref 0–1)
BENZODIAZ UR QL: NEGATIVE
BILIRUB SERPL-MCNC: 1.02 MG/DL (ref 0.2–1)
BUN SERPL-MCNC: 9 MG/DL (ref 5–25)
CALCIUM ALBUM COR SERPL-MCNC: 8.6 MG/DL (ref 8.3–10.1)
CALCIUM SERPL-MCNC: 7.8 MG/DL (ref 8.4–10.2)
CHLORIDE SERPL-SCNC: 102 MMOL/L (ref 96–108)
CO2 SERPL-SCNC: 24 MMOL/L (ref 21–32)
COCAINE UR QL: NEGATIVE
CREAT SERPL-MCNC: 0.76 MG/DL (ref 0.6–1.3)
EOSINOPHIL # BLD AUTO: 0.03 THOUSAND/ÂΜL (ref 0–0.61)
EOSINOPHIL NFR BLD AUTO: 0 % (ref 0–6)
ERYTHROCYTE [DISTWIDTH] IN BLOOD BY AUTOMATED COUNT: 20.1 % (ref 11.6–15.1)
FENTANYL UR QL SCN: NEGATIVE
GFR SERPL CREATININE-BSD FRML MDRD: 97 ML/MIN/1.73SQ M
GLUCOSE P FAST SERPL-MCNC: 99 MG/DL (ref 65–99)
GLUCOSE SERPL-MCNC: 105 MG/DL (ref 65–140)
GLUCOSE SERPL-MCNC: 118 MG/DL (ref 65–140)
GLUCOSE SERPL-MCNC: 133 MG/DL (ref 65–140)
GLUCOSE SERPL-MCNC: 148 MG/DL (ref 65–140)
GLUCOSE SERPL-MCNC: 99 MG/DL (ref 65–140)
GLUCOSE SERPL-MCNC: 99 MG/DL (ref 65–140)
HCT VFR BLD AUTO: 32.2 % (ref 36.5–49.3)
HGB BLD-MCNC: 10.6 G/DL (ref 12–17)
HYDROCODONE UR QL SCN: NEGATIVE
IMM GRANULOCYTES # BLD AUTO: 0.03 THOUSAND/UL (ref 0–0.2)
IMM GRANULOCYTES NFR BLD AUTO: 0 % (ref 0–2)
LYMPHOCYTES # BLD AUTO: 0.9 THOUSANDS/ÂΜL (ref 0.6–4.47)
LYMPHOCYTES NFR BLD AUTO: 12 % (ref 14–44)
MAGNESIUM SERPL-MCNC: 2.1 MG/DL (ref 1.9–2.7)
MCH RBC QN AUTO: 30.4 PG (ref 26.8–34.3)
MCHC RBC AUTO-ENTMCNC: 32.9 G/DL (ref 31.4–37.4)
MCV RBC AUTO: 92 FL (ref 82–98)
METHADONE UR QL: NEGATIVE
MONOCYTES # BLD AUTO: 1.49 THOUSAND/ÂΜL (ref 0.17–1.22)
MONOCYTES NFR BLD AUTO: 20 % (ref 4–12)
NEUTROPHILS # BLD AUTO: 5.11 THOUSANDS/ÂΜL (ref 1.85–7.62)
NEUTS SEG NFR BLD AUTO: 67 % (ref 43–75)
NRBC BLD AUTO-RTO: 0 /100 WBCS
OPIATES UR QL SCN: NEGATIVE
OXYCODONE+OXYMORPHONE UR QL SCN: NEGATIVE
PCP UR QL: NEGATIVE
PLATELET # BLD AUTO: 231 THOUSANDS/UL (ref 149–390)
PMV BLD AUTO: 9.5 FL (ref 8.9–12.7)
POTASSIUM SERPL-SCNC: 3.8 MMOL/L (ref 3.5–5.3)
PROT SERPL-MCNC: 6.3 G/DL (ref 6.4–8.4)
QRS AXIS: 68 DEGREES
QRS AXIS: 73 DEGREES
QRS AXIS: 79 DEGREES
QRSD INTERVAL: 96 MS
QT INTERVAL: 306 MS
QT INTERVAL: 340 MS
QT INTERVAL: 342 MS
QTC INTERVAL: 444 MS
QTC INTERVAL: 481 MS
QTC INTERVAL: 484 MS
RBC # BLD AUTO: 3.49 MILLION/UL (ref 3.88–5.62)
SODIUM SERPL-SCNC: 138 MMOL/L (ref 135–147)
T WAVE AXIS: 234 DEGREES
T WAVE AXIS: 240 DEGREES
T WAVE AXIS: 250 DEGREES
THC UR QL: NEGATIVE
VENTRICULAR RATE: 120 BPM
VENTRICULAR RATE: 120 BPM
VENTRICULAR RATE: 127 BPM
WBC # BLD AUTO: 7.64 THOUSAND/UL (ref 4.31–10.16)

## 2025-04-14 PROCEDURE — 12032 INTMD RPR S/A/T/EXT 2.6-7.5: CPT | Performed by: PHYSICIAN ASSISTANT

## 2025-04-14 PROCEDURE — 97167 OT EVAL HIGH COMPLEX 60 MIN: CPT

## 2025-04-14 PROCEDURE — 87186 SC STD MICRODIL/AGAR DIL: CPT

## 2025-04-14 PROCEDURE — 93010 ELECTROCARDIOGRAM REPORT: CPT | Performed by: INTERNAL MEDICINE

## 2025-04-14 PROCEDURE — 87205 SMEAR GRAM STAIN: CPT

## 2025-04-14 PROCEDURE — 82948 REAGENT STRIP/BLOOD GLUCOSE: CPT

## 2025-04-14 PROCEDURE — 99215 OFFICE O/P EST HI 40 MIN: CPT | Performed by: INTERNAL MEDICINE

## 2025-04-14 PROCEDURE — 80053 COMPREHEN METABOLIC PANEL: CPT

## 2025-04-14 PROCEDURE — 85025 COMPLETE CBC W/AUTO DIFF WBC: CPT

## 2025-04-14 PROCEDURE — 87070 CULTURE OTHR SPECIMN AEROBIC: CPT

## 2025-04-14 PROCEDURE — 0HQ8XZZ REPAIR BUTTOCK SKIN, EXTERNAL APPROACH: ICD-10-PCS | Performed by: PHYSICIAN ASSISTANT

## 2025-04-14 PROCEDURE — 83735 ASSAY OF MAGNESIUM: CPT

## 2025-04-14 PROCEDURE — 99223 1ST HOSP IP/OBS HIGH 75: CPT | Performed by: INTERNAL MEDICINE

## 2025-04-14 PROCEDURE — 87147 CULTURE TYPE IMMUNOLOGIC: CPT

## 2025-04-14 PROCEDURE — 99204 OFFICE O/P NEW MOD 45 MIN: CPT | Performed by: SPECIALIST

## 2025-04-14 PROCEDURE — 87077 CULTURE AEROBIC IDENTIFY: CPT

## 2025-04-14 PROCEDURE — 12002 RPR S/N/AX/GEN/TRNK2.6-7.5CM: CPT | Performed by: PHYSICIAN ASSISTANT

## 2025-04-14 RX ORDER — HYDROMORPHONE HCL IN WATER/PF 6 MG/30 ML
0.2 PATIENT CONTROLLED ANALGESIA SYRINGE INTRAVENOUS EVERY 8 HOURS PRN
Status: DISCONTINUED | OUTPATIENT
Start: 2025-04-14 | End: 2025-04-16 | Stop reason: HOSPADM

## 2025-04-14 RX ORDER — LIDOCAINE 50 MG/G
1 PATCH TOPICAL DAILY
Status: DISCONTINUED | OUTPATIENT
Start: 2025-04-14 | End: 2025-04-16 | Stop reason: HOSPADM

## 2025-04-14 RX ORDER — LIDOCAINE HYDROCHLORIDE 10 MG/ML
10 INJECTION, SOLUTION EPIDURAL; INFILTRATION; INTRACAUDAL; PERINEURAL ONCE
Status: COMPLETED | OUTPATIENT
Start: 2025-04-14 | End: 2025-04-14

## 2025-04-14 RX ORDER — ACETAMINOPHEN 325 MG/1
975 TABLET ORAL EVERY 8 HOURS
Status: DISCONTINUED | OUTPATIENT
Start: 2025-04-14 | End: 2025-04-16 | Stop reason: HOSPADM

## 2025-04-14 RX ORDER — MAGNESIUM SULFATE HEPTAHYDRATE 40 MG/ML
INJECTION, SOLUTION INTRAVENOUS
Status: DISPENSED
Start: 2025-04-14 | End: 2025-04-14

## 2025-04-14 RX ORDER — LORAZEPAM 1 MG/1
2 TABLET ORAL ONCE
Status: COMPLETED | OUTPATIENT
Start: 2025-04-14 | End: 2025-04-14

## 2025-04-14 RX ADMIN — POTASSIUM CHLORIDE 20 MEQ: 14.9 INJECTION, SOLUTION INTRAVENOUS at 02:00

## 2025-04-14 RX ADMIN — PANTOPRAZOLE SODIUM 40 MG: 40 TABLET, DELAYED RELEASE ORAL at 06:12

## 2025-04-14 RX ADMIN — METOPROLOL TARTRATE 150 MG: 100 TABLET, FILM COATED ORAL at 20:39

## 2025-04-14 RX ADMIN — Medication 250 MG: at 17:31

## 2025-04-14 RX ADMIN — OXYCODONE HYDROCHLORIDE 5 MG: 5 TABLET ORAL at 20:38

## 2025-04-14 RX ADMIN — CEFAZOLIN SODIUM 2000 MG: 2 SOLUTION INTRAVENOUS at 06:12

## 2025-04-14 RX ADMIN — PANTOPRAZOLE SODIUM 40 MG: 40 TABLET, DELAYED RELEASE ORAL at 16:30

## 2025-04-14 RX ADMIN — Medication 2.5 MG: at 00:16

## 2025-04-14 RX ADMIN — LORAZEPAM 2 MG: 1 TABLET ORAL at 06:12

## 2025-04-14 RX ADMIN — OXYCODONE HYDROCHLORIDE 5 MG: 5 TABLET ORAL at 16:30

## 2025-04-14 RX ADMIN — MAGNESIUM SULFATE HEPTAHYDRATE 4 G: 40 INJECTION, SOLUTION INTRAVENOUS at 01:27

## 2025-04-14 RX ADMIN — Medication 250 MG: at 09:33

## 2025-04-14 RX ADMIN — FERROUS SULFATE TAB 325 MG (65 MG ELEMENTAL FE) 325 MG: 325 (65 FE) TAB at 09:33

## 2025-04-14 RX ADMIN — GABAPENTIN 300 MG: 300 CAPSULE ORAL at 20:38

## 2025-04-14 RX ADMIN — GABAPENTIN 300 MG: 300 CAPSULE ORAL at 16:29

## 2025-04-14 RX ADMIN — APIXABAN 5 MG: 5 TABLET, FILM COATED ORAL at 17:31

## 2025-04-14 RX ADMIN — GABAPENTIN 300 MG: 300 CAPSULE ORAL at 09:33

## 2025-04-14 RX ADMIN — OXYCODONE HYDROCHLORIDE 5 MG: 5 TABLET ORAL at 11:48

## 2025-04-14 RX ADMIN — RANOLAZINE 500 MG: 500 TABLET, FILM COATED, EXTENDED RELEASE ORAL at 20:38

## 2025-04-14 RX ADMIN — METOPROLOL TARTRATE 150 MG: 100 TABLET, FILM COATED ORAL at 09:33

## 2025-04-14 RX ADMIN — FOLIC ACID 1000 MCG: 1 TABLET ORAL at 09:33

## 2025-04-14 RX ADMIN — APIXABAN 5 MG: 5 TABLET, FILM COATED ORAL at 09:33

## 2025-04-14 RX ADMIN — ASPIRIN 81 MG: 81 TABLET, CHEWABLE ORAL at 09:33

## 2025-04-14 RX ADMIN — POTASSIUM CHLORIDE 20 MEQ: 14.9 INJECTION, SOLUTION INTRAVENOUS at 00:18

## 2025-04-14 RX ADMIN — SODIUM CHLORIDE, SODIUM LACTATE, POTASSIUM CHLORIDE, AND CALCIUM CHLORIDE 75 ML/HR: .6; .31; .03; .02 INJECTION, SOLUTION INTRAVENOUS at 23:14

## 2025-04-14 RX ADMIN — THIAMINE HCL TAB 100 MG 100 MG: 100 TAB at 09:33

## 2025-04-14 RX ADMIN — RANOLAZINE 500 MG: 500 TABLET, FILM COATED, EXTENDED RELEASE ORAL at 09:33

## 2025-04-14 RX ADMIN — DILTIAZEM HYDROCHLORIDE 120 MG: 60 CAPSULE, EXTENDED RELEASE ORAL at 20:39

## 2025-04-14 RX ADMIN — FLUTICASONE FUROATE AND VILANTEROL TRIFENATATE 1 PUFF: 200; 25 POWDER RESPIRATORY (INHALATION) at 09:35

## 2025-04-14 RX ADMIN — LIDOCAINE HYDROCHLORIDE 10 ML: 10 INJECTION, SOLUTION EPIDURAL; INFILTRATION; INTRACAUDAL; PERINEURAL at 17:31

## 2025-04-14 RX ADMIN — NICOTINE 1 PATCH: 21 PATCH, EXTENDED RELEASE TRANSDERMAL at 09:33

## 2025-04-14 RX ADMIN — ACETAMINOPHEN 975 MG: 325 TABLET ORAL at 16:30

## 2025-04-14 RX ADMIN — DILTIAZEM HYDROCHLORIDE 120 MG: 60 CAPSULE, EXTENDED RELEASE ORAL at 11:19

## 2025-04-14 RX ADMIN — LIDOCAINE 1 PATCH: 700 PATCH TOPICAL at 10:16

## 2025-04-14 NOTE — CASE MANAGEMENT
Case Management Assessment & Discharge Planning Note    Patient name Gregg Partida  Location [address] [facility]/[address] MRN [id number]  : 1960 Date 2025       Current Admission Date: 2025  Current Admission Diagnosis:Fall   Patient Active Problem List    Diagnosis Date Noted Date Diagnosed    Open wound of buttock 2025     Fracture of multiple ribs 2025     Atrial fibrillation with rapid ventricular response (HCC) 2025     Dyspnea on exertion 2025     Smoking 2024     Fall 2024     Head injury 2024     Hypotension 2024     Mucus plugging of bronchi 2024     Acute respiratory failure with hypoxia (HCC) 2024     Hyponatremia 2024     Closed fracture of one rib of left side 2024     Ambulatory dysfunction 2024     Financial insecurity 2024     BPH (benign prostatic hyperplasia) 2024     Chronic alcohol use 2024     Hypertension 2024     Hemorrhagic cystitis 2024     Hypothyroidism 2024     Gastrointestinal hemorrhage with melena 10/29/2023     Chest pain 2023     Longstanding persistent atrial fibrillation (HCC) 2023     GERD (gastroesophageal reflux disease) 2023     Stable angina (HCC) 2022     Stage 3a chronic kidney disease (HCC) 2022     Fatty liver, alcoholic 04/15/2022     Nonischemic nontraumatic myocardial injury 04/10/2022     Afib with RVR 10/25/2021     Mild protein-calorie malnutrition (HCC) 2021     Prostate cancer (HCC) 2021     Atrial fibrillation (HCC) 2020     Chronic heart failure with preserved ejection fraction (HCC) 2019     Noncompliance 2019     ETOH abuse 2019     SIRS (systemic inflammatory response syndrome) (HCC) 2019     Alcohol withdrawal (HCC) 10/17/2019     Generalized weakness 10/17/2019     Electrolyte abnormality 10/17/2019     Cervical spinal stenosis 10/17/2019     History of  prostate cancer 06/07/2019     CAD (coronary artery disease) 06/07/2019     Nicotine abuse 06/07/2019     Depression, recurrent (HCC) 10/10/2018     Hx of CABG 10/10/2018     Dyslipidemia 10/10/2018     COPD, severity to be determined (HCC) 10/09/2018     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 disease (HCC) 10/09/2018     Hypertensive heart and chronic kidney disease with heart failure and stage 1 through stage 4 chronic kidney disease, or chronic kidney disease (HCC) 10/09/2018       LOS (days): 0  Geometric Mean LOS (GMLOS) (days):   Days to GMLOS:     OBJECTIVE:     Current admission status: Observation    Preferred Pharmacy:   [facility] [place], [place] - [address] [place] [id number]  Phone: [phone] Fax: [phone]    [facility] [address] [place] [address] - [address]  [address]  [facility] [address]  Phone: [phone] Fax: [phone]    Primary Care Provider: Lilliana Bliss MD    Primary Insurance: [employer] REP  Secondary Insurance:     ASSESSMENT:  Active Health Care Proxies       Gregg Partida Jr Health Care Representative - Son   Primary Phone: [phone] (Mobile)                 Advance Directives  Does patient have a Health Care POA?: No  Does patient have Advance Directives?: No  Primary Contact: Gregg Partida Jr.    Obs Notice Signed: 04/14/25 (Met with pt to explain Medicare Outpatient Observation Notice.  Pt signed notice and copy given. Copy also placed in scan bin for chart.)    Readmission Root Cause  30 Day Readmission: No    Patient Information  Admitted from:: Home  Mental Status: Alert  During Assessment patient was accompanied by: Not accompanied during assessment  Assessment information provided by:: Patient  Primary Caregiver: Self  Support Systems: Son  County of Residence: [place]  What city do you live in?: [place]  Home entry access options. Select all that apply.: Elevator  Type of  Current Residence: Apartment  Floor Level: 5  Upon entering residence, is there a bedroom on the main floor (no further steps)?: Yes  Upon entering residence, is there a bathroom on the main floor (no further steps)?: Yes  Living Arrangements: Lives Alone    Activities of Daily Living Prior to Admission  Functional Status: Independent  Completes ADLs independently?: Yes  Ambulates independently?: Yes  Does patient use assisted devices?: Yes  Assisted Devices (DME) used: Rollator, Walker  Does patient currently own DME?: Yes  What DME does the patient currently own?: Rollator, Walker  Does patient currently have HHC?: No    Patient Information Continued  Income Source: SSI/SSD  Does patient have prescription coverage?: Yes  Can the patient afford their medications and any related supplies (such as glucometers or test strips)?: Yes  Does patient receive dialysis treatments?: No  Does patient have a history of substance abuse?: Yes  Historical substance use preference: Alcohol/ETOH  Is patient currently in treatment for substance abuse?: No. Patient declined treatment information. (Stated he wants to complete [employer] program to get his license back)  Does patient have a history of Mental Health Diagnosis?: No    Means of Transportation  Means of Transport to Appts:: [place]       DISCHARGE DETAILS:    Discharge planning discussed with:: Patient  Freedom of Choice: Yes  Comments - Freedom of Choice: MERT met with pt to assess needs and discuss plans.  Pt returned to the hospital after a fall at home.  MERT talked with pt about his injuries and what he remembered about the fall.  Pt said he remembers falling but does not know what his fell on to cause the laceration.  MERT talked with pt about plans for discharge including possible STR placement.  Pt said he needs to return home so he can get to the bank to pay his rent (per pt he is 3 months behind) and clean his apartment so he doesn't get thrown out.  MERT tried to talk with  pt about treatment for his alcohol use.  Pt said he needs to get into the [employer](?) program so he can get his license back.  SW offered that he would need to stop drinking before that so support may be helpful.  Pt stated that the doesn't drink much anymore and said he didn't have a lot before he fell.  SW talked with pt about his weakness and pt acknowledged that he is weak.  However pt is not willing to consider STR due to his need to return home. Pt said he would like home services but he needs to clean his apartment first.  SW asked about social support and pt said he has a son.  However he said his son works and has children so he is very busy.  SW encouraged pt to work with PT/OT when sessions are offered and pt said he did take a few steps this morning.  SW offered ongoing support and assistance to pt.  SW will follow to monitor progress and assist with planning as needed.  CM contacted family/caregiver?: No- see comments (per pt not at this time)  Were Treatment Team discharge recommendations reviewed with patient/caregiver?: Yes  Did patient/caregiver verbalize understanding of patient care needs?: Yes  Were patient/caregiver advised of the risks associated with not following Treatment Team discharge recommendations?: Yes    Contacts  Patient Contacts: Patient    Requested Home Health Care         Is the patient interested in HHC at discharge?: No    DME Referral Provided  Referral made for DME?: No    Other Referral/Resources/Interventions Provided:  Interventions: Other (Specify)  Referral Comments: SW will continue to follow to monitor progress and assist with planning as needed.    Treatment Team Recommendation: Short Term Rehab  Discharge Destination Plan:: Home  Transport at Discharge : Wheelchair van

## 2025-04-14 NOTE — ASSESSMENT & PLAN NOTE
Patient presented to the ED after a fall and was found to be in Afib w/ RVR.    ED Course: 125 mcg IV Digoxin    Plan:   Placed on telemetry   Continuous IV fluids  Continue home Eliquis 5 mg BID  Continue home 150 mg Metoprolol Q12  Held home 120 mg Cardizem Q12  Restart in am  Cardiology consulted, appreciate reccs  Continue to monitor

## 2025-04-14 NOTE — ASSESSMENT & PLAN NOTE
Open wound on right buttock, approximately 2 cm deep.   Irregular appearance, likely as a result of laceration from falling.  See media tab for pictures.  Tetanus UTD, last shot in 2020.    IV Ancef 2g Q8h (4/13- )    Wound culture collected  Wound care consulted, appreciate [facility]  General Surgery consulted  Xray of Rt. Sacrum pending  2g IV Ancef Q8

## 2025-04-14 NOTE — UTILIZATION REVIEW
"Initial Clinical Review    Observation 4/13/25 @ 2014 converted to inpatient admission 4/14/25 @ 1632 for continued care & tx s/p fall.    Admission: Date/Time/Statement:   Admission Orders (From admission, onward)       Ordered        04/14/25 1632  INPATIENT ADMISSION  Once            04/13/25 2014  Place in Observation  Once                          Orders Placed This Encounter   Procedures    INPATIENT ADMISSION     Standing Status:   Standing     Number of Occurrences:   1     Level of Care:   Med Surg [16]     Estimated length of stay:   More than 2 Midnights     Certification:   I certify that inpatient services are medically necessary for this patient for a duration of greater than two midnights. See H&P and MD Progress Notes for additional information about the patient's course of treatment.     ED Arrival Information       Expected   -    Arrival   4/13/2025 13:12    Acuity   Urgent              Means of arrival   Ambulance    Escorted by   [facility]    Admission type   Emergency              Arrival complaint   fall-etoh intox             Chief Complaint   Patient presents with    Fall    Alcohol Intoxication     Per EMS, pt fell from standing. Pt states he drank \"a small amount\" of vodka PTA. Hx of alcohol use. Denies pain. A&Ox4.       Initial Presentation:  65 yom to ER from home via EMS for alcohol intoxication s/p fall. Does admit to drinking prior to arrival (vodka). Hx alcohol abuse, CAD, A-fib on anticoagulation, diabetes mellitus type 2, hypertension, COPD, GERD, nicotine dependence, hyponatremia. Presents tachycardic. Admission EKG: Afib w/ RVR. CT head, cervical spine neg. CT chest, a/p: Acute left 10th through 12th rib fractures, as described above. Bilateral healing rib fractures are otherwise stable since the prior exam. No pneumothorax.  Labs: Hgb 11.2, Na 133, Cl 94, anion gap 16, Ca 8.0, Mg 0.9, ETOH 182.  Placed in observation status s/p fall. Plan: telemetry, " CIWA protocol, IVABT, IVF, cardio consult accuchecks, seizure precautions, therapies.    Observation to IP admission 4/14/25:  S/p fall.  Noted to be very sleepy and lethargic.  Only reports recollection of falling. Pain mgt in progress for fx ribs. Afib with RVR POA. HR better controlled with resumption of home meds, cardio following; no further cardiac work-up needed. IV K repletion given. Wound care consulted for open wound noted on buttocks. Sacral xray ordered. IVABT, IVF continued. CIWA score up to 9 today for tremors, sweats, headache.     Date: 4/15/25  Day 3: Has surpassed a 2nd midnight with active treatments and services.  S/p fall, pain mgt ongoing for fx ribs with Oxycodone. S/p bedside laceration repair & washout of open wound on buttocks yesterday by surgery, dressing in place, no surrounding erythema or inflammation noted. IV Mg repletion given. CIWA protocol continued, score today 2 for tremors, anxiety, monitor. Continue to monitor for withdrawal s/s. Monitor VS, follow labs/lytes.      ED Treatment-Medication Administration from 04/13/2025 1311 to 04/13/2025 2106         Date/Time Order Dose Route Action     04/13/2025 1329 sodium chloride 0.9 % bolus 1,000 mL 1,000 mL Intravenous New Bag     04/13/2025 1405 dextrose 5 % and sodium chloride 0.9 % bolus 1,000 mL 1,000 mL Intravenous New Bag     04/13/2025 1509 thiamine (VITAMIN B1) 100 mg in sodium chloride 0.9 % 50 mL IVPB 100 mg Intravenous New Bag     04/13/2025 1500 folic acid 1 mg in sodium chloride 0.9 % 50 mL IVPB 1 mg Intravenous New Bag     04/13/2025 1453 iohexol (OMNIPAQUE) 350 MG/ML injection (MULTI-DOSE) 100 mL 100 mL Intravenous Given     04/13/2025 2009 digoxin (LANOXIN) injection 125 mcg 125 mcg Intravenous Given     04/13/2025 2008 magnesium sulfate 2 g/50 mL IVPB (premix) 2 g 2 g Intravenous New Bag     04/13/2025 2100 diltiazem (CARDIZEM SR) 12 hr capsule 120 mg -- Oral Held Dose     04/13/2025 2045 magnesium Oxide (MAG-OX)  tablet 800 mg -- Oral Held Dose     04/13/2025 2100 metoprolol tartrate (LOPRESSOR) tablet 150 mg -- Oral Held Dose     04/13/2025 2045 ranolazine (RANEXA) 12 hr tablet 500 mg -- Oral Held Dose            Scheduled Medications:  Medications 04/06 04/07 04/08 04/09 04/10 04/11 04/12 04/13 04/14 04/15   acetaminophen (TYLENOL) tablet 975 mg  Dose: 975 mg  Freq: Every 8 hours Route: PO  Start: 04/14/25 1615            1630      0001     0738     1615        albumin human (FLEXBUMIN) 25 % injection 25 g  Dose: 25 g  Freq: Once Route: IV  Start: 04/06/25 1030 End: 04/06/25 1046   Admin Instructions:       1046                 apixaban (ELIQUIS) tablet 5 mg  Dose: 5 mg  Freq: 2 times daily Route: PO  Indications of Use: ATRIAL FIBRILLATION  Start: 04/13/25 2045   Admin Instructions:      Order specific questions:              2201      0933     1731      0842     1800        apixaban (ELIQUIS) tablet 5 mg  Dose: 5 mg  Freq: 2 times daily Route: PO  Start: 04/06/25 0900 End: 04/09/25 1849   Admin Instructions:      Order specific questions:       0948     1810      0941     1758      0907     1736      0841     1849-D/C'd            aspirin chewable tablet 81 mg  Dose: 81 mg  Freq: Daily Route: PO  Start: 04/14/25 0900            0933      0843        aspirin chewable tablet 81 mg  Dose: 81 mg  Freq: Daily Route: PO  Start: 04/06/25 0900 End: 04/09/25 1849    0948      0941      0906      0841     1849-D/C'd            ceFAZolin (ANCEF) IVPB (premix in dextrose) 2,000 mg 50 mL  Dose: 2,000 mg  Freq: Every 8 hours Route: IV  Last Dose: 2,000 mg (04/14/25 0612)  Start: 04/13/25 2300 End: 04/14/25 1541   Admin Instructions:      Order specific questions:              2307      0612     1500)     1541-D/C'd       cephalexin (KEFLEX) capsule 500 mg  Dose: 500 mg  Freq: Every 6 hours scheduled Route: PO  Start: 04/14/25 0000 End: 04/13/25 2248   Admin Instructions:      Order specific questions:              2248-D/C'd         dextrose 5 % and sodium chloride 0.9 % bolus 1,000 mL  Dose: 1,000 mL  Freq: Once Route: IV  Last Dose: Stopped (04/13/25 1943)  Start: 04/13/25 1400 End: 04/13/25 1943           1405 1943          diazepam (VALIUM) injection 5 mg  Dose: 5 mg  Freq: Once Route: IV  Start: 04/05/25 1945 End: 04/05/25 1943   Admin Instructions:                   digoxin (LANOXIN) injection 125 mcg  Dose: 125 mcg  Freq: Once Route: IV  Start: 04/13/25 2000 End: 04/13/25 2009   Admin Instructions:              2009          digoxin (LANOXIN) injection 125 mcg  Dose: 125 mcg  Freq: Once Route: IV  Start: 04/06/25 1030 End: 04/06/25 1046   Admin Instructions:       1046                 diltiazem (CARDIZEM SR) 12 hr capsule 120 mg  Dose: 120 mg  Freq: Every 12 hours scheduled Route: PO  Start: 04/13/25 2100   Admin Instructions:      Order specific questions:              2040 2100 0816 1119 2039 0844     2100        diltiazem (CARDIZEM SR) 12 hr capsule 120 mg  Dose: 120 mg  Freq: Every 12 hours scheduled Route: PO  Start: 04/06/25 1800 End: 04/09/25 1849   Admin Instructions:      Order specific questions:       1811      (0651) 1089 7027     2059      0842     1849-D/C'd            ferrous sulfate tablet 325 mg  Dose: 325 mg  Freq: Daily with breakfast Route: PO  Start: 04/14/25 0730   Admin Instructions:               0933      0738        ferrous sulfate tablet 325 mg  Dose: 325 mg  Freq: Daily with breakfast Route: PO  Start: 04/06/25 0730 End: 04/09/25 1849   Admin Instructions:       0948      0739      0738      0841     1849-D/C'd            fluticasone-vilanterol 200-25 mcg/actuation 1 puff  Dose: 1 puff  Freq: Daily Route: IN  Start: 04/14/25 0900   Admin Instructions:               0935      0845        fluticasone-vilanterol 200-25 mcg/actuation 1 puff  Dose: 1 puff  Freq: Daily Route: IN  Start: 04/06/25 0900 End: 04/09/25 1849   Admin Instructions:       0948      0942      0908       0842     1849-D/C'd            folic acid (FOLVITE) tablet 1,000 mcg  Dose: 1,000 mcg  Freq: Daily Route: PO  Start: 04/14/25 0900            0933      0842        folic acid (FOLVITE) tablet 1,000 mcg  Dose: 1,000 mcg  Freq: Daily Route: PO  Start: 04/06/25 0900 End: 04/09/25 1849    0948      0941      0906      0842     1849-D/C'd            folic acid 1 mg in sodium chloride 0.9 % 50 mL IVPB  Dose: 1 mg  Freq: Once Route: IV  Last Dose: Stopped (04/13/25 1632)  Start: 04/13/25 1415 End: 04/13/25 1632           1500     1632          gabapentin (NEURONTIN) capsule 300 mg  Dose: 300 mg  Freq: 3 times daily Route: PO  Start: 04/13/25 2100   Admin Instructions:              2206      0955     1622     2035      0843     1600     2100        insulin lispro (HumALOG/ADMELOG) 100 units/mL subcutaneous injection 1-5 Units  Dose: 1-5 Units  Freq: Daily at bedtime Route: SC  Start: 04/13/25 2245   Admin Instructions:              2241) [C]      2205) [C]      2200         insulin lispro (HumALOG/ADMELOG) 100 units/mL subcutaneous injection 1-5 Units  Dose: 1-5 Units  Freq: 3 times daily before meals Route: SC  Start: 04/14/25 0700   Admin Instructions:               (2211) (6368) (4451) 7121 [C]     9900     1600         insulin lispro (HumALOG/ADMELOG) 100 units/mL subcutaneous injection 1-6 Units  Dose: 1-6 Units  Freq: 3 times daily before meals Route: SC  Start: 04/14/25 0700 End: 04/13/25 2235   Admin Instructions:              2235-D/C'd        lidocaine (LIDODERM) 5 % patch 1 patch  Dose: 1 patch  Freq: Daily Route: TP  Start: 04/14/25 1000   Admin Instructions:      Order specific questions:               1016 [C]     9207 3111 9198        lidocaine (LIDODERM) 5 % patch 1 patch  Dose: 1 patch  Freq: Daily Route: TP  Start: 04/07/25 0900 End: 04/09/25 1849   Admin Instructions:      Order specific questions:        0998 4453 (8266) (2499) 2961-D/C'd            lidocaine  (LIDODERM) 5 % patch 1 patch  Dose: 1 patch  Freq: Daily Route: TP  Start: 04/07/25 0715 End: 04/07/25 0702   Admin Instructions:      Order specific questions:        0702-D/C'd              lidocaine (LIDODERM) 5 % patch 1 patch  Dose: 1 patch  Freq: Daily Route: TP  Start: 04/06/25 0900 End: 04/07/25 0701   Admin Instructions:      Order specific questions:       0948     2225 [C]      0701-D/C'd              lidocaine (PF) (XYLOCAINE-MPF) 1 % injection 10 mL  Dose: 10 mL  Freq: Once Route: INFILTRATION  Indications of Use: LOCAL ANESTHESIA  Start: 04/14/25 1715 End: 04/14/25 1731            1731         LORazepam (ATIVAN) tablet 2 mg  Dose: 2 mg  Freq: Once Route: PO  Indications of Use: ALCOHOL WITHDRAWAL SYNDROME  Start: 04/14/25 0600 End: 04/14/25 0612   Admin Instructions:               0612         LORazepam (ATIVAN) tablet 2 mg  Dose: 2 mg  Freq: Once Route: PO  Indications of Use: ALCOHOL WITHDRAWAL SYNDROME  Start: 04/06/25 2245 End: 04/06/25 2250   Admin Instructions:       2250                 LORazepam (ATIVAN) tablet 2 mg  Dose: 2 mg  Freq: Once Route: PO  Indications of Use: ALCOHOL WITHDRAWAL SYNDROME  Start: 04/06/25 1330 End: 04/06/25 1332   Admin Instructions:       1332                 LORazepam (ATIVAN) tablet 2 mg  Dose: 2 mg  Freq: Once Route: PO  Indications of Use: ALCOHOL WITHDRAWAL SYNDROME  Start: 04/06/25 0015 End: 04/06/25 0026   Admin Instructions:       0026                 magnesium Oxide (MAG-OX) tablet 800 mg  Dose: 800 mg  Freq: 2 times daily Route: PO  Start: 04/13/25 2045           2042     2045      (0900)     (1800)      0900     1800        magnesium Oxide (MAG-OX) tablet 800 mg  Dose: 800 mg  Freq: 2 times daily Route: PO  Start: 04/06/25 0900 End: 04/09/25 1849    0948     1621     1800      0716     0716     0900     1800      (0900)     1800      (0900)     1849     1849-D/C'd            magnesium sulfate 2 g/50 mL IVPB (premix) 2 g  Dose: 2 g  Freq: Once Route:  IV  Last Dose: Stopped (04/13/25 2028)  Start: 04/13/25 2000 End: 04/13/25 2028   Admin Instructions:              2008 2028          magnesium sulfate 2 g/50 mL IVPB (premix) 2 g  Dose: 2 g  Freq: Once Route: IV  Last Dose: Stopped (04/05/25 2059)  Start: 04/05/25 2000 End: 04/05/25 2059   Admin Instructions:                   magnesium sulfate 4 g/100 mL IVPB (premix) **ADS Override Pull**  Start: 04/14/25 0056 End: 04/14/25 1259   Admin Instructions:               0100     1259-D/C'd       magnesium sulfate 4 g/100 mL IVPB (premix) 4 g  Dose: 4 g  Freq: Once Route: IV  Last Dose: 4 g (04/15/25 0935)  Start: 04/15/25 0900   Admin Instructions:                0935        magnesium sulfate 4 g/100 mL IVPB (premix) 4 g  Dose: 4 g  Freq: Once Route: IV  Last Dose: 4 g (04/14/25 0127)  Start: 04/13/25 2345 End: 04/14/25 0527   Admin Instructions:               0127         magnesium sulfate 4 g/100 mL IVPB (premix) 4 g  Dose: 4 g  Freq: Once Route: IV  Last Dose: 4 g (04/09/25 0943)  Start: 04/09/25 0930 End: 04/09/25 1343   Admin Instructions:          0943              magnesium sulfate 4 g/100 mL IVPB (premix) 4 g  Dose: 4 g  Freq: Once Route: IV  Last Dose: 4 g (04/07/25 2300)  Start: 04/07/25 2300 End: 04/08/25 0300   Admin Instructions:        2300                magnesium sulfate 4 g/100 mL IVPB (premix) 4 g  Dose: 4 g  Freq: Once Route: IV  Last Dose: 4 g (04/07/25 0737)  Start: 04/07/25 0730 End: 04/07/25 1137   Admin Instructions:        0737                magnesium sulfate 4 g/100 mL IVPB (premix) 4 g  Dose: 4 g  Freq: Once Route: IV  Last Dose: 4 g (04/06/25 0948)  Start: 04/06/25 0630 End: 04/06/25 1348   Admin Instructions:       0948                 metoprolol (LOPRESSOR) injection 5 mg  Dose: 5 mg  Freq: Once Route: IV  Start: 04/06/25 1945 End: 04/06/25 2025   Admin Instructions:       2025                 metoprolol tartrate (LOPRESSOR) tablet 150 mg  Dose: 150 mg  Freq: Every 12 hours scheduled  Route: PO  Start: 04/13/25 2230   Admin Instructions:      Order specific questions:              2307      0933     2039      0841     2100        metoprolol tartrate (LOPRESSOR) tablet 150 mg  Dose: 150 mg  Freq: Every 12 hours scheduled Route: PO  Start: 04/13/25 2100 End: 04/13/25 2224   Admin Instructions:      Order specific questions:              2041 2100 2117     2224-D/C'd        metoprolol tartrate (LOPRESSOR) tablet 150 mg  Dose: 150 mg  Freq: Every 12 hours scheduled Route: PO  Start: 04/06/25 2100 End: 04/09/25 1849   Admin Instructions:      Order specific questions:       2220 [C]      0941     2137      0906     2054 0841     1849-D/C'd            metoprolol tartrate (LOPRESSOR) tablet 150 mg  Dose: 150 mg  Freq: Every 12 hours scheduled Route: PO  Start: 04/06/25 0900 End: 04/06/25 1023   Admin Instructions:      Order specific questions:       (1673)     1023-D/C'd               nicotine (NICODERM CQ) 14 mg/24hr TD 24 hr patch 1 patch  Dose: 1 patch  Freq: Daily Route: TD  Start: 04/06/25 0900 End: 04/06/25 0843   Admin Instructions:       0843-D/C'd               nicotine (NICODERM CQ) 21 mg/24 hr TD 24 hr patch 1 patch  Dose: 1 patch  Freq: Daily Route: TD  Start: 04/14/25 0900   Admin Instructions:               0943      0846        nicotine (NICODERM CQ) 21 mg/24 hr TD 24 hr patch 1 patch  Dose: 1 patch  Freq: Daily Route: TD  Start: 04/07/25 0715 End: 04/09/25 1849   Admin Instructions:        0740 [C]     0795      0723 (7100) (1407) 6220-D/C'd            nicotine (NICODERM CQ) 21 mg/24 hr TD 24 hr patch 1 patch  Dose: 1 patch  Freq: Daily Route: TD  Start: 04/06/25 0900 End: 04/07/25 0702   Admin Instructions:       0948      0702-D/C'd              pantoprazole (PROTONIX) EC tablet 40 mg  Dose: 40 mg  Freq: 2 times daily before meals Route: PO  Start: 04/14/25 0700   Admin Instructions:               0645     7241 0820 4432        pantoprazole  (PROTONIX) EC tablet 40 mg  Dose: 40 mg  Freq: 2 times daily Route: PO  Start: 04/05/25 2245 End: 04/09/25 1849   Admin Instructions:       0948     2217      0941     2137      0906     2054 0841     1849-D/C'd            potassium chloride (Klor-Con M20) CR tablet 40 mEq  Dose: 40 mEq  Freq: Once Route: PO  Start: 04/09/25 0930 End: 04/09/25 0943   Admin Instructions:          0943              potassium chloride 20 mEq IVPB (premix)  Dose: 20 mEq  Freq: Every 2 hours scheduled Route: IV  Last Dose: 20 mEq (04/14/25 0200)  Start: 04/14/25 0000 End: 04/14/25 0400   Admin Instructions:               0018     0200         ranolazine (RANEXA) 12 hr tablet 500 mg  Dose: 500 mg  Freq: Every 12 hours Route: PO  Start: 04/13/25 2045   Admin Instructions:              2041 2045      0816     0933     2038      0841     2045        ranolazine (RANEXA) 12 hr tablet 500 mg  Dose: 500 mg  Freq: Every 12 hours scheduled Route: PO  Start: 04/06/25 0900 End: 04/09/25 1849   Admin Instructions:       0948     2217      0941     2137      0906     2054      0841     1849-D/C'd            saccharomyces boulardii (FLORASTOR) capsule 250 mg  Dose: 250 mg  Freq: 2 times daily Route: PO  Start: 04/13/25 2115           2201      0933     1731      0842     1800        saccharomyces boulardii (FLORASTOR) capsule 250 mg  Dose: 250 mg  Freq: 2 times daily Route: PO  Start: 04/09/25 0900 End: 04/09/25 1849       0841     1849-D/C'd            sodium chloride 0.9 % bolus 1,000 mL  Dose: 1,000 mL  Freq: Once Route: IV  Last Dose: Stopped (04/13/25 1405)  Start: 04/13/25 1330 End: 04/13/25 1405           1329     1405          sodium chloride 0.9 % bolus 1,000 mL  Dose: 1,000 mL  Freq: Once Route: IV  Indications of Use: FLUID AND ELECTROLYTE DISTURBANCE  Last Dose: Stopped (04/05/25 2115)  Start: 04/05/25 2045 End: 04/05/25 2115                sodium chloride 0.9 % bolus 1,000 mL  Dose: 1,000 mL  Freq: Once Route: IV  Last Dose:  Stopped (04/05/25 2033)  Start: 04/05/25 2000 End: 04/05/25 2033                sodium chloride 0.9 % bolus 1,000 mL  Dose: 1,000 mL  Freq: Once Route: IV  Last Dose: Stopped (04/05/25 1959)  Start: 04/05/25 1915 End: 04/05/25 1959                sodium chloride 0.9 % bolus 250 mL  Dose: 250 mL  Freq: Once Route: IV  Start: 04/08/25 0915 End: 04/08/25 0908      0908               tamsulosin (FLOMAX) capsule 0.4 mg  Dose: 0.4 mg  Freq: Daily Route: PO  Start: 04/14/25 0900   Admin Instructions:              2041      (0900)      0900        tamsulosin (FLOMAX) capsule 0.4 mg  Dose: 0.4 mg  Freq: Daily Route: PO  Start: 04/06/25 0900 End: 04/09/25 1849   Admin Instructions:       0948      0941      0906      0841     1849-D/C'd            thiamine (VITAMIN B1) 100 mg in sodium chloride 0.9 % 50 mL IVPB  Dose: 100 mg  Freq: Once Route: IV  Last Dose: Stopped (04/13/25 1632)  Start: 04/13/25 1415 End: 04/13/25 1632           1509     1632          thiamine (VITAMIN B1) 100 mg in sodium chloride 0.9 % 50 mL IVPB  Dose: 100 mg  Freq: Daily Route: IV  Start: 04/06/25 0900 End: 04/06/25 0937    0937-D/C'd  0946                 thiamine tablet 100 mg  Dose: 100 mg  Freq: Daily Route: PO  Start: 04/14/25 0900            0933      0842        thiamine tablet 100 mg  Dose: 100 mg  Freq: Daily Route: PO  Start: 04/07/25 0900 End: 04/09/25 1849     0941      0906      0841     1849-D/C'd            Legend:       [id number]/0604/0704/0804/0904/1004/1104/1204/1304/1404/15        Continuous Meds Sorted by Name  for Gregg Partida as of 04/06/25 through 4/15/25  Legend:       Medications 04/06 04/07 04/08 04/09 04/10 04/11 04/12 04/13 04/14 04/15   lactated ringers infusion  Rate: 75 mL/hr Dose: 75 mL/hr  Freq: Continuous Route: IV  Indications of Use: IV Hydration  Last Dose: 75 mL/hr (04/14/25 2314)  Start: 04/13/25 2130           2202      0926     2314         lactated ringers infusion  Rate: 75 mL/hr Dose: 75 mL/hr  Freq:  Continuous Route: IV  Last Dose: Stopped (04/07/25 0732)  Start: 04/05/25 2245 End: 04/07/25 0715    0953     2254      0715-D/C'd  0732 [C]                sodium chloride 0.9 % infusion  Rate: 100 mL/hr Dose: 100 mL/hr  Freq: Continuous Route: IV  Last Dose: Stopped (04/08/25 0909)  Start: 04/08/25 0715 End: 04/08/25 0904      0738     0904-D/C'd  0909 [C]               sodium chloride 0.9 % infusion  Rate: 100 mL/hr Dose: 100 mL/hr  Freq: Continuous Route: IV  Start: 04/08/25 0715 End: 04/08/25 0708      0708-D/C'd             sodium chloride 0.9 % infusion  Rate: 75 mL/hr Dose: 75 mL/hr  Freq: Continuous Route: IV  Indications of Use: IV Hydration  Start: 04/05/25 2230 End: 04/05/25 2232                Legend:       [id number]/0604/0704/0804/0904/1004/1104/1204/1304/1404/15        PRN Meds Sorted by Name  for Gregg Partida as of 04/06/25 through 4/15/25  Legend:       Medications 04/06 04/07 04/08 04/09 04/10 04/11 04/12 04/13 04/14 04/15   acetaminophen (TYLENOL) tablet 650 mg  Dose: 650 mg  Freq: Every 4 hours PRN Route: PO  PRN Reason: mild pain  Start: 04/13/25 2139 End: 04/14/25 1614            (0022) [C]     1614-D/C'd       albuterol (PROVENTIL HFA,VENTOLIN HFA) inhaler 2 puff  Dose: 2 puff  Freq: Every 4 hours PRN Route: IN  PRN Reason: wheezing  Start: 04/13/25 2038   Admin Instructions:                   albuterol (PROVENTIL HFA,VENTOLIN HFA) inhaler 2 puff  Dose: 2 puff  Freq: Every 4 hours PRN Route: IN  PRN Reason: wheezing  Start: 04/05/25 2231 End: 04/09/25 1849   Admin Instructions:          1849-D/C'd            aluminum-magnesium hydroxide-simethicone (MAALOX) oral suspension 30 mL  Dose: 30 mL  Freq: Every 4 hours PRN Route: PO  PRN Reasons: indigestion,heartburn  Start: 04/13/25 2038   Admin Instructions:                   aluminum-magnesium hydroxide-simethicone (MAALOX) oral suspension 30 mL  Dose: 30 mL  Freq: Every 4 hours PRN Route: PO  PRN Reasons: indigestion,heartburn  Start:  04/05/25 2231 End: 04/09/25 1849   Admin Instructions:          1849-D/C'd            HYDROmorphone HCl (DILAUDID) injection 0.2 mg  Dose: 0.2 mg  Freq: Every 8 hours PRN Route: IV  PRN Reason: breakthrough pain  Start: 04/14/25 1517   Admin Instructions:                   iohexol (OMNIPAQUE) 350 MG/ML injection (MULTI-DOSE) 100 mL  Dose: 100 mL  Freq: Once in imaging Route: IV  PRN Reason: contrast  Start: 04/13/25 1453 End: 04/13/25 1453           1453          magnesium sulfate 4 g/100 mL IVPB (premix) **ADS Override Pull**  Start: 04/14/25 0056 End: 04/14/25 1259   Admin Instructions:               0100     1259-D/C'd        oxyCODONE (ROXICODONE) split tablet 2.5 mg  Dose: 2.5 mg  Freq: Every 4 hours PRN Route: PO  PRN Reason: moderate pain  Start: 04/13/25 2139   Admin Instructions:               0016                                  Or   oxyCODONE (ROXICODONE) IR tablet 5 mg  Dose: 5 mg  Freq: Every 4 hours PRN Route: PO  PRN Reason: severe pain  Start: 04/13/25 2139   Admin Instructions:                    1148     1630     2038      0038     0547        senna-docusate sodium (SENOKOT S) 8.6-50 mg per tablet 1 tablet  Dose: 1 tablet  Freq: Daily PRN Route: PO  PRN Reason: constipation  Start: 04/13/25 2038                senna-docusate sodium (SENOKOT S) 8.6-50 mg per tablet 1 tablet  Dose: 1 tablet  Freq: Daily PRN Route: PO  PRN Reason: constipation  Start: 04/05/25 2231 End: 04/09/25 1849       1849-D/C'd            trimethobenzamide (TIGAN) IM injection 200 mg  Dose: 200 mg  Freq: Every 8 hours PRN Route: IM  PRN Reasons: nausea,vomiting  Start: 04/06/25 1402 End: 04/09/25 1849   Admin Instructions:       1636        1849-D/C'd            Legend:       [id number]/0604/0704/0804/0904/1004/1104/1204/1304/1404/15            ED Triage Vitals   Temperature Pulse Respirations Blood Pressure SpO2 Pain Score   04/13/25 1318 04/13/25 1318 04/13/25 1318 04/13/25 1318 04/13/25 1318 04/13/25 2115   98.7 °F (37.1  °C) 105 18 135/79 94 % 6     Weight (last 2 days)       Date/Time Weight    04/13/25 21:20:41 84.1 (185.3)            Vital Signs (last 3 days)       Date/Time Temp Pulse Resp BP MAP (mmHg) SpO2 O2 Flow Rate (L/min) O2 Device Patient Position - Orthostatic VS [place] Coma Scale Score CIWA-Ar Total Pain    04/15/25 0800 -- 57 -- 110/59 -- -- -- -- -- -- 2 --    04/15/25 07:43:07 99.7 °F (37.6 °C) 64 -- 111/62 78 95 % -- -- -- -- -- --    04/15/25 0738 -- -- -- -- -- -- -- -- -- -- -- 6    04/15/25 0547 -- -- -- -- -- -- -- -- -- -- -- 7    04/15/25 0500 -- -- -- 93/58 70 -- -- -- -- -- -- --    04/15/25 04:50:37 98.2 °F (36.8 °C) 80 18 93/58 70 -- -- -- -- -- -- --    04/15/25 02:44:46 98.3 °F (36.8 °C) 84 17 101/56 71 -- -- -- -- -- -- --    04/15/25 0038 -- -- -- -- -- -- -- -- -- -- -- 8    04/15/25 00:05:37 98.7 °F (37.1 °C) 92 18 124/65 85 -- -- -- -- -- 1 --    04/15/25 0001 -- -- -- -- -- -- -- -- -- -- -- 8 04/14/25 2038 -- -- -- -- -- -- -- -- -- -- -- 9    04/14/25 20:10:37 98.8 °F (37.1 °C) 85 18 128/73 91 -- -- -- -- -- 1 --    04/14/25 1745 -- 75 -- -- -- -- -- -- -- -- 0 --    04/14/25 1700 -- 76 -- -- -- -- -- -- -- -- -- --    04/14/25 1630 -- -- -- -- -- -- -- -- -- -- -- 9    04/14/25 1600 -- 90 -- -- -- -- -- -- -- -- -- --    04/14/25 15:46:55 99 °F (37.2 °C) 89 17 125/71 89 97 % -- -- -- -- 0 --    04/14/25 13:56:45 -- 84 -- 132/62 85 97 % -- -- -- -- 2 --    04/14/25 1148 -- -- -- -- -- -- -- -- -- -- -- 7    04/14/25 1103 -- -- -- -- -- -- -- -- -- -- -- 8    04/14/25 09:23:34 -- 114 -- 147/84 105 94 % -- -- Lying -- 5 --    04/14/25 07:25:31 -- 97 20 121/66 84 96 % 3 L/min None (Room air) -- -- 5 5    04/14/25 05:02:44 -- 97 -- 173/100 124 95 % -- -- -- -- 9 --    04/14/25 03:20:52 99.2 °F (37.3 °C) 103 17 149/90 110 98 % -- -- -- -- -- --    04/14/25 01:02:42 -- 95 -- 100/57 71 97 % -- -- -- -- -- --    04/14/25 0100 -- 92 -- 100/57 -- -- -- -- -- -- 7 --    04/14/25 0016 -- -- -- -- -- --  -- -- -- -- -- 6 04/13/25 22:35:45 -- 114 21 146/87 107 97 % -- -- -- -- -- --    04/13/25 2200 -- -- -- -- -- 99 % 4 L/min Nasal cannula -- 15 -- --    04/13/25 2123 -- -- -- -- -- 95 % 4 L/min Nasal cannula -- -- -- --    04/13/25 21:20:41 97.7 °F (36.5 °C) 116 20 169/93 118 98 % -- -- -- -- 5 --    04/13/25 2115 -- -- -- -- -- -- -- -- -- -- -- 6 04/13/25 2015 -- 109 18 126/78 97 97 % -- None (Room air) Lying -- -- --    04/13/25 1944 -- 122 -- 140/82 -- -- -- -- -- 15 4 --    04/13/25 1943 -- 122 18 140/82 105 94 % -- None (Room air) Lying -- -- --    04/13/25 1638 -- -- -- -- -- -- -- -- -- 15 -- --    04/13/25 1629 -- -- -- -- -- -- -- -- -- 15 -- --    04/13/25 1528 -- -- -- -- -- -- -- -- -- 15 -- --    04/13/25 1509 -- -- -- -- -- -- -- -- -- 15 -- --    04/13/25 1414 -- -- -- -- -- -- -- -- -- 15 -- --    04/13/25 1338 -- -- -- -- -- -- -- None (Room air) -- -- -- --    04/13/25 1337 -- -- -- -- -- -- -- -- -- 15 -- --    04/13/25 1335 -- -- -- -- -- -- -- -- -- 15 -- --    04/13/25 1318 98.7 °F (37.1 °C) 105 18 135/79 -- 94 % -- None (Room air) Lying -- -- --           CIWA-Ar Score       Row Name 04/15/25 0800 04/15/25 00:05:37 04/14/25 20:10:37       CIWA-Ar    /59 -- --    Pulse 57 -- 85    Nausea and Vomiting 0 0 0    Tactile Disturbances 0 0 0    Tremor 1 1 0    Auditory Disturbances 0 0 0    Paroxysmal Sweats 0 0 0    Visual Disturbances 0 0 0    Anxiety 1 0 1    Headache, Fullness in Head 0 0 0    Agitation 0 0 0    Orientation and Clouding of Sensorium 0 0 0    CIWA-Ar Total 2 1 1      Row Name 04/14/25 1745 04/14/25 1700 04/14/25 1600       CIWA-Ar    Pulse 75 76 90    Nausea and Vomiting 0 -- --    Tactile Disturbances 0 -- --    Tremor 0 -- --    Auditory Disturbances 0 -- --    Paroxysmal Sweats 0 -- --    Visual Disturbances 0 -- --    Anxiety 0 -- --    Headache, Fullness in Head 0 -- --    Agitation 0 -- --    Orientation and Clouding of Sensorium 0 -- --    CIWA-Ar Total 0 --  --      Row Name 04/14/25 15:46:55 04/14/25 13:56:45 04/14/25 09:23:34       CIWA-Ar    Nausea and Vomiting 0 0 0    Tactile Disturbances 0 0 0    Tremor 0 0 2    Auditory Disturbances 0 0 0    Paroxysmal Sweats 0 0 1    Visual Disturbances 0 0 0    Anxiety 0 0 0    Headache, Fullness in Head 0 2 2    Agitation 0 0 0    Orientation and Clouding of Sensorium 0 0 0    CIWA-Ar Total 0 2 5      Row Name 04/14/25 07:25:31 04/14/25 05:02:44 04/14/25 0100       CIWA-Ar    BP -- -- 100/57    Pulse -- -- 92    Nausea and Vomiting 0 0 0    Tactile Disturbances 0 0 0    Tremor 2 4 2    Auditory Disturbances 0 0 0    Paroxysmal Sweats 1 2 2    Visual Disturbances 0 0 0    Anxiety 0 0 0    Headache, Fullness in Head 2 3 3    Agitation 0 0 0    Orientation and Clouding of Sensorium 0 0 0    CIWA-Ar Total 5 9 7      Row Name 04/13/25 21:20:41 04/13/25 1944          CIWA-Ar    BP -- 140/82     Pulse -- 122     Nausea and Vomiting 0 0     Tactile Disturbances 0 0     Tremor 3 2     Auditory Disturbances 0 0     Paroxysmal Sweats 2 2     Visual Disturbances 0 0     Anxiety 0 0     Headache, Fullness in Head 0 0     Agitation 0 0     Orientation and Clouding of Sensorium 0 0     CIWA-Ar Total 5 4                     Pertinent Labs/Diagnostic Test Results:   Radiology:  CT head without contrast   Final Interpretation by Chris Lugo MD (04/13 1086)      No interval change. No acute intracranial hemorrhage or depressed calvarial fracture identified                  Workstation performed: [id number]         CT spine cervical without contrast   Final Interpretation by Chris Lugo MD (04/13 0694)      No interval change. No acute fracture or evidence for traumatic malalignment.                  Workstation performed: [id number]         CT chest abdomen pelvis w contrast   Final Interpretation by Chris Lugo MD (04/13 4949)      Acute left 10th through 12th rib fractures, as described above. Bilateral healing rib fractures are otherwise stable  since the prior exam. No pneumothorax.      No evidence for acute intra-abdominal or pelvic visceral organ injury.      Remainder of the findings, as described above.      The study was marked in EPIC for immediate notification.         Workstation performed: [id number]           Cardiology:  ECG 12 lead   Final Result by Carley Hernandez MD (04/14 0800)   Atrial fibrillation with rapid ventricular response   ST & T wave abnormality, consider inferior ischemia   ST & T wave abnormality, consider anterolateral ischemia   Abnormal ECG   When compared with ECG of 13-Apr-2025 21:33, (unconfirmed)   No significant change was found   Confirmed by Carley Hernandez ([id number]) on 4/14/2025 8:00:28 AM      ECG 12 lead   Final Result by Carley Hernandez MD (04/14 0800)   Atrial fibrillation with rapid ventricular response   ST & T wave abnormality, consider inferior ischemia   ST & T wave abnormality, consider anterolateral ischemia   Abnormal ECG   Confirmed by Carley Hernandez ([id number]) on 4/14/2025 8:00:22 AM      ECG 12 lead   Final Result by Carley Hernandez MD (04/14 0758)   Atrial fibrillation with rapid ventricular response   Minimal voltage criteria for LVH, may be normal variant ( [place] product    )   ST & T wave abnormality, consider inferior ischemia   ST & T wave abnormality, consider anterolateral ischemia   Abnormal ECG   Confirmed by Carley Hernandez ([id number]) on 4/14/2025 7:57:57 AM        GI:  No orders to display           Results from last 7 days   Lab Units 04/15/25  0553 04/14/25 0616 04/13/25  1329   WBC Thousand/uL 8.86 7.64 8.29   HEMOGLOBIN g/dL 9.2* 10.6* 11.2*   HEMATOCRIT % 28.7* 32.2* 33.7*   PLATELETS Thousands/uL 188 231 239   TOTAL NEUT ABS Thousands/µL 5.87 5.11 4.96         Results from last 7 days   Lab Units 04/15/25  0553 04/14/25  0616 04/13/25 2009 04/13/25  1329 04/09/25  0706 04/08/25  1155   SODIUM mmol/L 132* 138  --  133* 130* 128*   POTASSIUM mmol/L 3.8 3.8  --  3.5 3.5 3.6   CHLORIDE mmol/L 98 102  --  94*  94* 93*   CO2 mmol/L 23 24  --  23 24 25   ANION GAP mmol/L 11 12  --  16* 12 10   BUN mg/dL 21 9  --  13 23 21   CREATININE mg/dL 1.13 0.76  --  0.90 1.05 0.98   EGFR ml/min/1.73sq m 68 97  --  90 75 82   CALCIUM mg/dL 7.9* 7.8*  --  8.0* 8.2* 8.2*   MAGNESIUM mg/dL 1.4* 2.1 0.9*  --  1.3*  --      Results from last 7 days   Lab Units 04/15/25  0553 04/14/25  0616   AST U/L 21 26   ALT U/L 15 27   ALK PHOS U/L 73 87   TOTAL PROTEIN g/dL 5.8* 6.3*   ALBUMIN g/dL 3.0* 3.0*   TOTAL BILIRUBIN mg/dL 0.88 1.02*     Results from last 7 days   Lab Units 04/15/25  1057 04/15/25  0714 04/15/25  0635 04/15/25  0005 04/14/25  2111 04/14/25  1556 04/14/25  1042 04/14/25  0728 04/14/25  0615 04/13/25  2347 04/13/25  2126   POC GLUCOSE mg/dl 119 134 153* 134 133 118 148* 99 105 117 115     Results from last 7 days   Lab Units 04/15/25  0553 04/14/25  0616 04/13/25  1329 04/09/25  0706 04/08/25  1155   GLUCOSE RANDOM mg/dL 134 99 110 128 145*             BETA-HYDROXYBUTYRATE   Date Value Ref Range Status   01/22/2024 3.2 (H) <0.6 mmol/L Final                  Results from last 7 days   Lab Units 04/13/25 2009   CK TOTAL U/L 190                                                                                     Results from last 7 days   Lab Units 04/13/25  2344   AMPH/METH  Negative   BARBITURATE UR  Negative   BENZODIAZEPINE UR  Negative   COCAINE UR  Negative   METHADONE URINE  Negative   OPIATE UR  Negative   PCP UR  Negative   THC UR  Negative     Results from last 7 days   Lab Units 04/13/25 2009   ETHANOL LVL mg/dL 182*                 Results from last 7 days   Lab Units 04/14/25  0038 04/13/25  2343   BLOOD CULTURE   --  No Growth at 24 hrs.  Received in Microbiology Lab. Culture in Progress.   GRAM STAIN RESULT  1+ Polys  No organisms seen  --    WOUND CULTURE  2+ Growth of Enterococcus species*  1+ Growth of  --                    Past Medical History:   Diagnosis Date    Acute on chronic kidney failure  (HCC)      Alcohol abuse     Alcohol withdrawal (HCC) 06/07/2019    Atrial fibrillation (HCC)     Cancer (HCC)     prostate ca,had radiation    Cardiac disease     stents,then triple bypass    COPD (chronic obstructive pulmonary disease) (HCC)     Coronary artery disease     ETOH abuse     Heart failure (HCC)     History of heart surgery     says [facility] bypass [facility]    Hx of heart artery stent     2014    Hyperlipidemia     Hypertension     Hyponatremia 10/17/2019    Hypovolemic shock (HCC) 12/22/2019    Lumbar spondylitis (HCC) 10/13/2022    Metabolic acidosis, increased anion gap 04/21/2021    Nasal bone fracture 10/10/2022    Prostate CA (HCC)     S/P CABG x 3     2004    Sleep apnea      Present on Admission:   Fall   Nicotine abuse   COPD, severity to be determined (HCC)   Alcohol withdrawal (HCC)   SIRS (systemic inflammatory response syndrome) (HCC)   CAD (coronary artery disease)   Hyponatremia   Electrolyte abnormality   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 disease (HCC)      Admitting Diagnosis: Alcohol intoxication (HCC) [F10.929]  Rib fracture [S22.39XA]  Atrial fibrillation with rapid ventricular response (HCC) [I48.91]  Atrial fibrillation with RVR (HCC) [I48.91]  H/O ETOH abuse [F10.11]  Age/Sex: 65 y.o. male    Network Utilization Review Department  ATTENTION: Please call with any questions or concerns to [phone] and carefully listen to the prompts so that you are directed to the right person. All voicemails are confidential.   For Discharge needs, contact Care Management DC Support Team at [phone] opt. 2  Send all requests for admission clinical reviews, approved or denied determinations and any other requests to dedicated fax number below belonging to the campus where the patient is receiving treatment. List of dedicated fax numbers for the Facilities:  FACILITY NAME UR FAX NUMBER   ADMISSION DENIALS (Administrative/Medical Necessity) [phone]   DISCHARGE SUPPORT TEAM  (NETWORK) [phone]   PARENT CHILD HEALTH (Maternity/NICU/Pediatrics) [phone]   [facility] [phone]   [facility] [phone]   [facility] [phone]   [facility] [phone]   [facility] [phone]   [facility] [phone]   [facility] [phone]   [facility] [phone]   [facility] [phone]   [facility] [phone]   [facility] [phone]   [facility] [phone]

## 2025-04-14 NOTE — ASSESSMENT & PLAN NOTE
Lab Results   Component Value Date    HGBA1C 5.4 02/15/2025       Recent Labs     04/13/25 2126 04/13/25  2347   POCGLU 115 117       Blood Sugar Average: Last 72 hrs:  (P) 116    Patient hasn't eaten in a few days according to him.    Home metformin held  ISS  POCT fingerstick Q6  Hypoglycemia protocol

## 2025-04-14 NOTE — ASSESSMENT & PLAN NOTE
Patient was found to have hypomagnesemia on admission.   In the ED, received 2g IV Mg    Labs: Mg 0.9, K 3.5     Given 4g IV Mg  Given 40 mEq K  Replete as needed

## 2025-04-14 NOTE — PROGRESS NOTES
Progress Note - Family Medicine   Name: Gregg Partida 65 y.o. male I MRN: [id number]  Unit/Bed#: [address] [place] [address] Date of Admission: 4/13/2025   Date of Service: 4/14/2025 I Hospital Day: 0    Assessment & Plan  Fall  Patient admits to fall yesterday after drinking. Patient unsure of how much he consumed and how he fell. States he hasn't had anything to eat in a few days.   CT chest/abd/pelv: Acute left 10th through 12th rib fractures, as described above. Bilateral healing rib fractures are otherwise stable since the prior exam.   CT head:  No interval change. No acute intracranial hemorrhage or depressed calvarial fracture identified   CT cervical spine: No interval change. No acute fracture or evidence for traumatic malalignment.     ED Course: 1L IV D5NS, 100 mg Thiamine, 1 mg Folic acid, 2g IV mg, 1 L IV NS     Plan:   Home inhalers continued  Incentive spirometry   Tylenol 650 mg Q4 for mild pain, Merle 2.5 mg Q4 prn for moderate pain, and 5 mg prn for severe pain   Fall precautions in place  Continue to monitor  Wound care in place  SIRS (systemic inflammatory response syndrome) (HCC)  Patient presented with tachycardia and eventually became tachypneic.   Likely due to Afib with RVR vs. Fall w/ injury    Wound culture pending  Blood culture x 2 pending  100 mL/hr IV LR  Continue to monitor   Afib with RVR  Patient presented to the ED after a fall and was found to be in Afib w/ RVR.    ED Course: 125 mcg IV Digoxin    Plan:   Placed on telemetry   Continuous IV fluids  Continue home Eliquis 5 mg BID  Continue home 150 mg Metoprolol Q12  Held home 120 mg Cardizem Q12  Restart in am  Cardiology consulted, appreciate reccs  Continue to monitor  Fracture of multiple ribs  Patient had a fall 1 day ago, and sustained rib fractures. On PE, patient is using accessory muscles to breathe, and has increased WOB.   Denies chest pain and SOB    CT chest/abd/pelv:  Acute left 10th through 12th rib fractures, as described  above. Bilateral healing rib fractures are otherwise stable since the prior exam. No pneumothorax.     Plan:   Pain regimen in place  Incentive spirometry   Home inhalers  Continue to monitor   Rest of plan per fall  Open wound of buttock  Open wound on right buttock, approximately 2 cm deep.   Irregular appearance, likely as a result of laceration from falling.  See media tab for pictures.  Tetanus UTD, last shot in 2020.    IV Ancef 2g Q8h (4/13- )    Wound culture collected  Wound care consulted, appreciate [facility]  General Surgery consulted  Xray of Rt. Sacrum pending  2g IV Ancef Q8  COPD, severity to be determined (HCC)  Chronic stable. Not in acute exacerbation. Currently satting 98% on 4L NC in setting of multiple rib fractures after a fall.   Home on Albuterol inhaler and Breo Ellipta inhaler.    Continue home inhalers  Rest of plan per rib fractures   History of prostate cancer  Chronic, stable, PMH of prostate CA (2020), s/p resection 2021. Home medication flomax 0.4 mg daily     Home Flomax held in setting of Afib w/ RVR   CAD (coronary artery disease)  Patient has a h/o CAD s/p CABG. Patient is home on Aspirin 81 mg EC tablet daily, Ranolazine 500 mg Q12, and Eliquis 5 mg BID.    Continue home Aspirin 81mg   Continue home Eliquis   Held home Ranolazine  Can resume in the am   Nicotine abuse  Chronic stable smoked 1.5 packs x day    Nicotine 21 mg / 24 hour patch  Smoking cessation encouraged   Alcohol withdrawal (HCC)  Patient presented to the ER after a fall, he denies any head trauma and LOC. Last drink was 1 day ago.   No tremors, sweating, AMS.  Ethanol level: 182    S/p 1L IV D5NS, 100 mg Thiamine, 1 mg Folic acid, 2g IV mg, 1 L IV NS     Plan:   Continuous IV fluids  CIWA protocol   Bedside dysphagia screen   Vlad/CHO diet as tolerated  Thiamine, folate, and multivitamins  Fall precautions  Seizure precautions   Hyponatremia  Chronic hyponatremia likely secondary to alcohol use. Na on admission was  133.     IVF  Trend Na with daily labs  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 disease (HCC)  Lab Results   Component Value Date    HGBA1C 5.4 02/15/2025       Recent Labs     04/13/25  2126 04/13/25  2347 04/14/25  0615 04/14/25  0728   POCGLU 115 117 105 99       Blood Sugar Average: Last 72 hrs:  (P) 109    Patient hasn't eaten in a few days according to him.    Home metformin held  ISS  POCT fingerstick Q6  Hypoglycemia protocol  Electrolyte abnormality  Patient was found to have hypomagnesemia on admission.   In the ED, received 2g IV Mg    Labs: Mg 0.9, K 3.5     Given 4g IV Mg  Given 40 mEq K  Replete as needed     VTE Pharmacologic Prophylaxis: VTE Score: 3 High Risk (Score >/= 5) - Pharmacological DVT Prophylaxis Ordered: apixaban (Eliquis). Sequential Compression Devices Ordered.    Mobility:   Basic Mobility Inpatient Raw Score: 13  JH-HLM Goal: 4: Move to chair/commode  JH-HLM Achieved: 2: Bed activities/Dependent transfer  JH-HLM Goal NOT achieved. Continue with multidisciplinary rounding and encourage appropriate mobility to improve upon JH-HLM goals.    Patient Centered Rounds: I performed bedside rounds with nursing staff today.   Discussions with Specialists or Other Care Team Provider: Cardiology    Education and Discussions with Family / Patient:  Will attempt to call .     Current Length of Stay: 0 day(s)  Current Patient Status: Observation   Certification Statement: The patient will continue to require additional inpatient hospital stay due to alcohol withdrawal and s/p fall  Discharge Plan: Anticipate discharge in 24-48 hrs to home.    Code Status: Level 1 - Full Code    Subjective   Patient seen and examined at bedside.  Noted to be very sleepy and lethargic.  Only reports recollection of falling. Reports anterior rib pain.    Objective :  Temp:  [97.7 °F (36.5 °C)-99.2 °F (37.3 °C)] 99.2 °F (37.3 °C)  HR:  [] 97  BP: (100-173)/() 121/66  Resp:   [17-21] 20  SpO2:  [94 %-99 %] 96 %  O2 Device: Nasal cannula    Body mass index is 23.79 kg/m².     Input and Output Summary (last 24 hours):     Intake/Output Summary (Last 24 hours) at 4/14/2025 0815  Last data filed at 4/13/2025 2028  Gross per 24 hour   Intake 2030 ml   Output --   Net 2030 ml       Physical Exam  Vitals and nursing note reviewed.   Constitutional:       General: He is sleeping.   HENT:      Head: Normocephalic and atraumatic.      Nose: Nose normal.      Mouth/Throat:      Mouth: Mucous membranes are dry.      Pharynx: Oropharynx is clear.   Eyes:      Conjunctiva/sclera: Conjunctivae normal.   Cardiovascular:      Rate and Rhythm: Regular rhythm. Tachycardia present.   Pulmonary:      Effort: Pulmonary effort is normal. No respiratory distress.      Breath sounds: Normal breath sounds.      Comments: Decreased air movement, in the setting of known rib fractures  Abdominal:      General: Abdomen is flat. Bowel sounds are normal.      Palpations: Abdomen is soft.   Musculoskeletal:         General: No signs of injury.      Right lower leg: No edema.      Left lower leg: No edema.   Skin:     Comments: Right buttocks wound covered with dressing, no surrounding erythema or inflammation noted   Neurological:      Mental Status: Mental status is at baseline. He is lethargic and disoriented.           Lines/Drains:        Telemetry:  Telemetry Orders (From admission, onward)               24 Hour Telemetry Monitoring  Continuous x 24 Hours (Telem)        Question:  Reason for 24 Hour Telemetry  Answer:  Arrhythmias requiring acute medical intervention / PPM or ICD malfunction                     Telemetry Reviewed: Atrial fibrillation. HR averaging 90  Indication for Continued Telemetry Use: Arrthymias requiring medical therapy               Lab Results: I have reviewed the following results:   Results from last 7 days   Lab Units 04/14/25  0616   WBC Thousand/uL 7.64   HEMOGLOBIN g/dL 10.6*    HEMATOCRIT % 32.2*   PLATELETS Thousands/uL 231   SEGS PCT % 67   LYMPHO PCT % 12*   MONO PCT % 20*   EOS PCT % 0     Results from last 7 days   Lab Units 04/14/25  0616   SODIUM mmol/L 138   POTASSIUM mmol/L 3.8   CHLORIDE mmol/L 102   CO2 mmol/L 24   BUN mg/dL 9   CREATININE mg/dL 0.76   ANION GAP mmol/L 12   CALCIUM mg/dL 7.8*   ALBUMIN g/dL 3.0*   TOTAL BILIRUBIN mg/dL 1.02*   ALK PHOS U/L 87   ALT U/L 27   AST U/L 26   GLUCOSE RANDOM mg/dL 99         Results from last 7 days   Lab Units 04/14/25  0728 04/14/25  0615 04/13/25  2347 04/13/25  2126   POC GLUCOSE mg/dl 99 105 117 115               Recent Cultures (last 7 days):               Last 24 Hours Medication List:     Current Facility-Administered Medications:     magnesium sulfate 4 g/100 mL IVPB (premix) **ADS Override Pull**,     acetaminophen (TYLENOL) tablet 650 mg, Q4H PRN    albuterol (PROVENTIL HFA,VENTOLIN HFA) inhaler 2 puff, Q4H PRN    aluminum-magnesium hydroxide-simethicone (MAALOX) oral suspension 30 mL, Q4H PRN    apixaban (ELIQUIS) tablet 5 mg, BID    aspirin chewable tablet 81 mg, Daily    ceFAZolin (ANCEF) IVPB (premix in dextrose) 2,000 mg 50 mL, Q8H, Last Rate: 2,000 mg (04/14/25 0612)    [Held by provider] diltiazem (CARDIZEM SR) 12 hr capsule 120 mg, Q12H SEDA    ferrous sulfate tablet 325 mg, Daily With Breakfast    fluticasone-vilanterol 200-25 mcg/actuation 1 puff, Daily    folic acid (FOLVITE) tablet 1,000 mcg, Daily    gabapentin (NEURONTIN) capsule 300 mg, TID    insulin lispro (HumALOG/ADMELOG) 100 units/mL subcutaneous injection 1-5 Units, TID AC **AND** Fingerstick Glucose (POCT), TID AC    insulin lispro (HumALOG/ADMELOG) 100 units/mL subcutaneous injection 1-5 Units, HS    lactated ringers infusion, Continuous, Last Rate: 100 mL/hr (04/13/25 2202)    [Held by provider] magnesium Oxide (MAG-OX) tablet 800 mg, BID    metoprolol tartrate (LOPRESSOR) tablet 150 mg, Q12H SEDA    nicotine (NICODERM CQ) 21 mg/24 hr TD 24 hr patch 1  patch, Daily    oxyCODONE (ROXICODONE) split tablet 2.5 mg, Q4H PRN **OR** oxyCODONE (ROXICODONE) IR tablet 5 mg, Q4H PRN    pantoprazole (PROTONIX) EC tablet 40 mg, BID AC    [Held by provider] ranolazine (RANEXA) 12 hr tablet 500 mg, Q12H    saccharomyces boulardii (FLORASTOR) capsule 250 mg, BID    senna-docusate sodium (SENOKOT S) 8.6-50 mg per tablet 1 tablet, Daily PRN    [Held by provider] tamsulosin (FLOMAX) capsule 0.4 mg, Daily    thiamine tablet 100 mg, Daily    Administrative Statements   Today, Patient Was Seen By: Patience Renee MD      **Please Note: This note may have been constructed using a voice recognition system.**

## 2025-04-14 NOTE — ASSESSMENT & PLAN NOTE
Lab Results   Component Value Date    HGBA1C 5.4 02/15/2025       Recent Labs     04/13/25  2126 04/13/25  2347 04/14/25  0615 04/14/25  0728   POCGLU 115 117 105 99       Blood Sugar Average: Last 72 hrs:  (P) 109    Patient hasn't eaten in a few days according to him.    Home metformin held  ISS  POCT fingerstick Q6  Hypoglycemia protocol

## 2025-04-14 NOTE — PLAN OF CARE
Problem: Potential for Falls  Goal: Patient will remain free of falls  Description: INTERVENTIONS:- Educate patient/family on patient safety including physical limitations- Instruct patient to call for assistance with activity - Consult OT/PT to assist with strengthening/mobility - Keep Call bell within reach- Keep bed low and locked with side rails adjusted as appropriate- Keep care items and personal belongings within reach- Initiate and maintain comfort rounds- Make Fall Risk Sign visible to staff- Offer Toileting every 2 Hours, in advance of need- Initiate/Maintain bed alarm- Obtain necessary fall risk management equipment: non skid socks- Apply yellow socks and bracelet for high fall risk patients- Consider moving patient to room near nurses station  INTERVENTIONS:- Educate patient/family on patient safety including physical limitations- Instruct patient to call for assistance with activity - Consult OT/PT to assist with strengthening/mobility - Keep Call bell within reach- Keep bed low and locked with side rails adjusted as appropriate- Keep care items and personal belongings within reach- Initiate and maintain comfort rounds- Make Fall Risk Sign visible to staff- Offer Toileting every 2 Hours, in advance of need- Initiate/Maintain bed alarm- Obtain necessary fall risk management equipment: non skid socks- Apply yellow socks and bracelet for high fall risk patients- Consider moving patient to room near nurses station  Outcome: Progressing     Problem: PAIN - ADULT  Goal: Verbalizes/displays adequate comfort level or baseline comfort level  Description: Interventions:- Encourage patient to monitor pain and request assistance- Assess pain using appropriate pain scale- Administer analgesics based on type and severity of pain and evaluate response- Implement non-pharmacological measures as appropriate and evaluate response- Consider cultural and social influences on pain and pain management- Notify  physician/advanced practitioner if interventions unsuccessful or patient reports new pain  Outcome: Progressing     Problem: INFECTION - ADULT  Goal: Absence or prevention of progression during hospitalization  Description: INTERVENTIONS:- Assess and monitor for signs and symptoms of infection- Monitor lab/diagnostic results- Monitor all insertion sites, i.e. indwelling lines, tubes, and drains- Monitor endotracheal if appropriate and nasal secretions for changes in amount and color- Hooper appropriate cooling/warming therapies per order- Administer medications as ordered- Instruct and encourage patient and family to use good hand hygiene technique- Identify and instruct in appropriate isolation precautions for identified infection/condition  Outcome: Progressing  Goal: Absence of fever/infection during neutropenic period  Description: INTERVENTIONS:- Monitor WBC  Outcome: Progressing     Problem: SAFETY ADULT  Goal: Patient will remain free of falls  Description: INTERVENTIONS:- Educate patient/family on patient safety including physical limitations- Instruct patient to call for assistance with activity - Consult OT/PT to assist with strengthening/mobility - Keep Call bell within reach- Keep bed low and locked with side rails adjusted as appropriate- Keep care items and personal belongings within reach- Initiate and maintain comfort rounds- Make Fall Risk Sign visible to staff- Offer Toileting every 2 Hours, in advance of need- Initiate/Maintain bed alarm- Obtain necessary fall risk management equipment: non skid socks- Apply yellow socks and bracelet for high fall risk patients- Consider moving patient to room near nurses station  INTERVENTIONS:- Educate patient/family on patient safety including physical limitations- Instruct patient to call for assistance with activity - Consult OT/PT to assist with strengthening/mobility - Keep Call bell within reach- Keep bed low and locked with side rails adjusted as  appropriate- Keep care items and personal belongings within reach- Initiate and maintain comfort rounds- Make Fall Risk Sign visible to staff- Offer Toileting every 2 Hours, in advance of need- Initiate/Maintain bed alarm- Obtain necessary fall risk management equipment: non skid socks- Apply yellow socks and bracelet for high fall risk patients- Consider moving patient to room near nurses station  Outcome: Progressing  Goal: Maintain or return to baseline ADL function  Description: INTERVENTIONS:-  Assess patient's ability to carry out ADLs; assess patient's baseline for ADL function and identify physical deficits which impact ability to perform ADLs (bathing, care of mouth/teeth, toileting, grooming, dressing, etc.)- Assess/evaluate cause of self-care deficits - Assess range of motion- Assess patient's mobility; develop plan if impaired- Assess patient's need for assistive devices and provide as appropriate- Encourage maximum independence but intervene and supervise when necessary- Involve family in performance of ADLs- Assess for home care needs following discharge - Consider OT consult to assist with ADL evaluation and planning for discharge- Provide patient education as appropriate  Outcome: Progressing  Goal: Maintains/Returns to pre admission functional level  Description: INTERVENTIONS:- Perform AM-PAC 6 Click Basic Mobility/ Daily Activity assessment daily.- Set and communicate daily mobility goal to care team and patient/family/caregiver. - Collaborate with rehabilitation services on mobility goals if consulted- Perform Range of Motion 3 times a day.- Reposition patient every 2 hours.- Dangle patient 3 times a day- Stand patient 3 times a day- Ambulate patient 2 times a day- Out of bed to chair 3 times a day - Out of bed for meals 3 times a day- Out of bed for toileting- Record patient progress and toleration of activity level   Outcome: Progressing     Problem: DISCHARGE PLANNING  Goal: Discharge to home  or other facility with appropriate resources  Description: INTERVENTIONS:- Identify barriers to discharge w/patient and caregiver- Arrange for needed discharge resources and transportation as appropriate- Identify discharge learning needs (meds, wound care, etc.)- Arrange for interpretive services to assist at discharge as needed- Refer to Case Management Department for coordinating discharge planning if the patient needs post-hospital services based on physician/advanced practitioner order or complex needs related to functional status, cognitive ability, or social support system  Outcome: Progressing     Problem: Knowledge Deficit  Goal: Patient/family/caregiver demonstrates understanding of disease process, treatment plan, medications, and discharge instructions  Description: Complete learning assessment and assess knowledge base.Interventions:- Provide teaching at level of understanding- Provide teaching via preferred learning methods  Outcome: Progressing     Problem: CARDIOVASCULAR - ADULT  Goal: Maintains optimal cardiac output and hemodynamic stability  Description: INTERVENTIONS:- Monitor I/O, vital signs and rhythm- Monitor for S/S and trends of decreased cardiac output- Administer and titrate ordered vasoactive medications to optimize hemodynamic stability- Assess quality of pulses, skin color and temperature- Assess for signs of decreased coronary artery perfusion- Instruct patient to report change in severity of symptoms  Outcome: Progressing  Goal: Absence of cardiac dysrhythmias or at baseline rhythm  Description: INTERVENTIONS:- Continuous cardiac monitoring, vital signs, obtain 12 lead EKG if ordered- Administer antiarrhythmic and heart rate control medications as ordered- Monitor electrolytes and administer replacement therapy as ordered  Outcome: Progressing     Problem: RESPIRATORY - ADULT  Goal: Achieves optimal ventilation and oxygenation  Description: INTERVENTIONS:- Assess for changes in  respiratory status- Assess for changes in mentation and behavior- Position to facilitate oxygenation and minimize respiratory effort- Oxygen administered by appropriate delivery if ordered- Initiate smoking cessation education as indicated- Encourage broncho-pulmonary hygiene including cough, deep breathe, Incentive Spirometry- Assess the need for suctioning and aspirate as needed- Assess and instruct to report SOB or any respiratory difficulty- Respiratory Therapy support as indicated  Outcome: Progressing     Problem: METABOLIC, FLUID AND ELECTROLYTES - ADULT  Goal: Electrolytes maintained within normal limits  Description: INTERVENTIONS:- Monitor labs and assess patient for signs and symptoms of electrolyte imbalances- Administer electrolyte replacement as ordered- Monitor response to electrolyte replacements, including repeat lab results as appropriate- Instruct patient on fluid and nutrition as appropriate  Outcome: Progressing  Goal: Fluid balance maintained  Description: INTERVENTIONS:- Monitor labs - Monitor I/O and WT- Instruct patient on fluid and nutrition as appropriate- Assess for signs & symptoms of volume excess or deficit  Outcome: Progressing  Goal: Glucose maintained within target range  Description: INTERVENTIONS:- Monitor Blood Glucose as ordered- Assess for signs and symptoms of hyperglycemia and hypoglycemia- Administer ordered medications to maintain glucose within target range- Assess nutritional intake and initiate nutrition service referral as needed  Outcome: Progressing     Problem: SKIN/TISSUE INTEGRITY - ADULT  Goal: Skin Integrity remains intact(Skin Breakdown Prevention)  Description: Assess:-Perform Herrera assessment every shift-Clean and moisturize skin every shift-Inspect skin when repositioning, toileting, and assisting with ADLS-Assess under medical devices such as non skid socks every ahift-Assess extremities for adequate circulation and sensation Bed Management:-Have minimal  linens on bed & keep smooth, unwrinkled-Change linens as needed when moist or perspiring-Avoid sitting or lying in one position for more than 2 hours while in bed- Toileting:-Offer bedside commode-Assess for incontinence every 2-Use incontinent care products after each incontinent episode such as wipes Activity:-Mobilize patient 3 times a day-Encourage activity and walks on unit-Encourage or provide ROM exercises -Turn and reposition patient every 2 Hours-Use appropriate equipment to lift or move patient in bed-Instruct/ Assist with weight shifting every 60 min when out of bed in chair-Consider limitation of chair time 3 hour intervalsSkin Care:-Avoid use of baby powder, tape, friction and shearing, hot water or constrictive clothing-Relieve pressure over bony prominences using pillows, wedges-Do not massage red bony areasNext Steps:-Teach patient strategies to minimize risks such as mobility -Consider consults to  interdisciplinary teams such as OT/PT  Outcome: Progressing  Goal: Incision(s), wounds(s) or drain site(s) healing without S/S of infection  Description: INTERVENTIONS- Assess and document dressing, incision, wound bed, drain sites and surrounding tissue- Provide patient and family education- Perform skin care/dressing changes every shift  Outcome: Progressing  Goal: Pressure injury heals and does not worsen  Description: Interventions:- Implement low air loss mattress or specialty surface (Criteria met)- Apply silicone foam dressing- Instruct/assist with weight shifting every 60 minutes when in chair - Limit chair time to 3 hour intervals- Use special pressure reducing interventions such as waffle cushion when in chair - Apply fecal or urinary incontinence containment device - Perform passive or active ROM every 4hr- Turn and reposition patient & offload bony prominences every 2 hours - Utilize friction reducing device or surface for transfers - Consider consults to  interdisciplinary teams such as  OT/PT- Use incontinent care products after each incontinent episode such as wipes- Consider nutrition services referral as needed  Outcome: Progressing     Problem: MUSCULOSKELETAL - ADULT  Goal: Maintain or return mobility to safest level of function  Description: INTERVENTIONS:- Assess patient's ability to carry out ADLs; assess patient's baseline for ADL function and identify physical deficits which impact ability to perform ADLs (bathing, care of mouth/teeth, toileting, grooming, dressing, etc.)- Assess/evaluate cause of self-care deficits - Assess range of motion- Assess patient's mobility- Assess patient's need for assistive devices and provide as appropriate- Encourage maximum independence but intervene and supervise when necessary- Involve family in performance of ADLs- Assess for home care needs following discharge - Consider OT consult to assist with ADL evaluation and planning for discharge- Provide patient education as appropriate  Outcome: Progressing  Goal: Maintain proper alignment of affected body part  Description: INTERVENTIONS:- Support, maintain and protect limb and body alignment- Provide patient/ family with appropriate education  Outcome: Progressing

## 2025-04-14 NOTE — H&P
H&P - Family Medicine   Name: Gregg Partida 65 y.o. male I MRN: [id number]  Unit/Bed#: 2 [address] Date of Admission: 4/13/2025   Date of Service: 4/14/2025 I Hospital Day: 0     Assessment & Plan  Fall  Patient admits to fall yesterday after drinking. Patient unsure of how much he consumed and how he fell. States he hasn't had anything to eat in a few days.   CT chest/abd/pelv: Acute left 10th through 12th rib fractures, as described above. Bilateral healing rib fractures are otherwise stable since the prior exam.   CT head:  No interval change. No acute intracranial hemorrhage or depressed calvarial fracture identified   CT cervical spine: No interval change. No acute fracture or evidence for traumatic malalignment.     ED Course: 1L IV D5NS, 100 mg Thiamine, 1 mg Folic acid, 2g IV mg, 1 L IV NS     Plan:   Home inhalers continued  Incentive spirometry   Tylenol 650 mg Q4 for mild pain, Merle 2.5 mg Q4 prn for moderate pain, and 5 mg prn for severe pain   Fall precautions in place  Continue to monitor  Wound care in place  SIRS (systemic inflammatory response syndrome) (HCC)  Patient presented with tachycardia and eventually became tachypneic.   Likely due to Afib with RVR vs. Fall w/ injury    Wound culture pending  Blood culture x 2 pending  100 mL/hr IV LR  Continue to monitor   Afib with RVR  Patient presented to the ED after a fall and was found to be in Afib w/ RVR.    ED Course: 125 mcg IV Digoxin    Plan:   Placed on telemetry   Continuous IV fluids  Continue home Eliquis 5 mg BID  Continue home 150 mg Metoprolol Q12  Held home 120 mg Cardizem Q12  Restart in am  Cardiology consulted, appreciate reccs  Continue to monitor  Fracture of multiple ribs  Patient had a fall 1 day ago, and sustained rib fractures. On PE, patient is using accessory muscles to breathe, and has increased WOB.   Denies chest pain and SOB    CT chest/abd/pelv:  Acute left 10th through 12th rib fractures, as described above.  Bilateral healing rib fractures are otherwise stable since the prior exam. No pneumothorax.     Plan:   Pain regimen in place  Incentive spirometry   Home inhalers  Continue to monitor   Rest of plan per fall  Open wound of buttock  Open wound on right buttock, approximately 2 cm deep.   Tetanus UTD, last shot in 2020.    Wound culture collected  Wound care consulted, appreciate reccs  Xray of Rt. Sacrum pending  2g IV Ancef Q8  COPD, severity to be determined (HCC)  Chronic stable. Not in acute exacerbation. Currently satting 98% on 4L NC in setting of multiple rib fractures after a fall.   Home on Albuterol inhaler and Breo Ellipta inhaler.    Continue home inhalers  Rest of plan per rib fractures   History of prostate cancer  Chronic, stable, PMH of prostate CA (2020), s/p resection 2021. Home medication flomax 0.4 mg daily     Home Flomax held in setting of Afib w/ RVR   CAD (coronary artery disease)  Patient has a h/o CAD s/p CABG. Patient is home on Aspirin 81 mg EC tablet daily, Ranolazine 500 mg Q12, and Eliquis 5 mg BID.    Continue home Aspirin 81mg   Continue home Eliquis   Held home Ranolazine  Can resume in the am   Nicotine abuse  Chronic stable smoked 1.5 packs x day    Nicotine 21 mg / 24 hour patch  Smoking cessation encouraged   Alcohol withdrawal (HCC)  Patient presented to the ER after a fall, he denies any head trauma and LOC. Last drink was 1 day ago.   No tremors, sweating, AMS.  Ethanol level: 182    S/p 1L IV D5NS, 100 mg Thiamine, 1 mg Folic acid, 2g IV mg, 1 L IV NS     Plan:   Continuous IV fluids  CIWA protocol   Bedside dysphagia screen   Vlad/CHO diet as tolerated  Thiamine, folate, and multivitamins  Fall precautions  Seizure precautions   Hyponatremia  Chronic hyponatremia likely secondary to alcohol use. Na on admission was 133.     IVF  Trend Na with daily labs  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 disease (HCC)  Lab Results   Component Value Date    HGBA1C 5.4  "02/15/2025       Recent Labs     04/13/25  2126 04/13/25  2347   POCGLU 115 117       Blood Sugar Average: Last 72 hrs:  (P) 116    Patient hasn't eaten in a few days according to him.    Home metformin held  ISS  POCT fingerstick Q6  Hypoglycemia protocol  Electrolyte abnormality  Patient was found to have hypomagnesemia on admission.   In the ED, received 2g IV Mg    Labs: Mg 0.9, K 3.5     Given 4g IV Mg  Given 40 mEq K  Replete as needed       VTE Pharmacologic Prophylaxis:   High Risk (Score >/= 5) - Pharmacological DVT Prophylaxis Ordered: apixaban (Eliquis). Sequential Compression Devices Ordered.  Code Status: Level 1 - Full Code   Discussion with family: Patient declined call to .     Anticipated Length of Stay: Patient will be admitted on an inpatient basis with an anticipated length of stay of greater than 2 midnights secondary to Afib with RVR.    History of Present Illness   Chief Complaint: Denisha Partida is a 65 y.o. male with a PMH of alcohol abuse, CAD, A-fib on anticoagulation, diabetes mellitus type 2, hypertension, COPD, GERD, nicotine dependence, hyponatremia who presents with a fall after intoxication x 1 day. Patient is unsure of how he fell but states his last drink was a day ago, but \"it wasn't a lot.\"  Patient denies chest pain, SOB, n/v/d, head trauma, LOC    Review of Systems   Unable to perform ROS: Acuity of condition       Historical Information   Past Medical History:   Diagnosis Date    Acute on chronic kidney failure  (HCC)     Alcohol abuse     Alcohol withdrawal (HCC) 06/07/2019    Atrial fibrillation (HCC)     Cancer (HCC)     prostate ca,had radiation    Cardiac disease     stents,then triple bypass    COPD (chronic obstructive pulmonary disease) (HCC)     Coronary artery disease     ETOH abuse     Heart failure (HCC)     History of heart surgery     says triple bypass [facility]    Hx of heart artery stent     2014    Hyperlipidemia     Hypertension  "    Hyponatremia 10/17/2019    Hypovolemic shock (HCC) 12/22/2019    Lumbar spondylitis ([facility]) 10/13/2022    Metabolic acidosis, increased anion gap 04/21/2021    Nasal bone fracture 10/10/2022    Prostate CA ([facility])     S/P CABG x 3     2004    Sleep apnea      Past Surgical History:   Procedure Laterality Date    CARDIAC CATHETERIZATION      2 stents    CORONARY ARTERY BYPASS GRAFT      CORONARY ARTERY BYPASS GRAFT  2004    OR ARTHRD ANT INTERBODY MIN DSC CRV BELOW C2 N/A 12/16/2020    Procedure: Anterior cervical discectomy with fusion C4-C7; Posterior cervical decompression and fusion C2-T2;  Surgeon: David Rowell MD;  Location: [facility];  Service: Neurosurgery    TONSILLECTOMY       Social History     Tobacco Use    Smoking status: Every Day     Current packs/day: 1.50     Average packs/day: 1.5 packs/day for 40.0 years (60.0 ttl pk-yrs)     Types: Cigarettes    Smokeless tobacco: Never   Vaping Use    Vaping status: Never Used   Substance and Sexual Activity    Alcohol use: Yes     Alcohol/week: 4.0 standard drinks of alcohol     Types: 4 Standard drinks or equivalent per week     Comment: quart of vodka daily    Drug use: No    Sexual activity: Not Currently     E-Cigarette/Vaping    E-Cigarette Use Never User      E-Cigarette/Vaping Substances    Nicotine Yes     THC No     CBD No     Flavoring No     Other No     Unknown No      Family history non-contributory  Social History:  Marital Status: Single   Occupation: Unemployed  Patient Pre-hospital Living Situation: Apartment  Patient Pre-hospital Level of Mobility: walks with walker  Patient Pre-hospital Diet Restrictions: None    Meds/Allergies   I have reviewed home medications using recent Epic encounter.  Prior to Admission medications    Medication Sig Start Date End Date Taking? Authorizing Provider   albuterol (Ventolin HFA) 90 mcg/act inhaler Inhale 2 puffs every 4 (four) hours as needed for wheezing 1/17/23   Emma Erwin, DO   aluminum-magnesium  hydroxide-simethicone (MAALOX) 9082-4108-544 mg/30 mL suspension Take 30 mL by mouth every 4 (four) hours as needed for indigestion or heartburn 2/14/24   Lois Kitchen DO   apixaban (Eliquis) 5 mg TAKE 1 TABLET BY MOUTH 2 TIMES A DAY DO NOT START BEFORE JANUARY 31, 2024. 6/28/24   Pablito Presley MD   aspirin (ECOTRIN LOW STRENGTH) 81 mg EC tablet Take 81 mg by mouth daily    Historical Provider, MD   Blood Glucose Monitoring Suppl (ONE TOUCH ULTRA MINI) w/Device KIT Use as directed 5/16/19   Historical Provider, MD   Blood Pressure Monitoring (Adult Blood Pressure Cuff Lg) KIT Use in the morning 12/14/23   Elloitt Zimmerman MD   Breo Ellipta 200-25 MCG/ACT inhaler Inhale 1 puff daily Rinse mouth after use. 6/9/23   Korin Lo MD   diltiazem (CARDIZEM SR) 120 mg 12 hr capsule Take 1 capsule (120 mg total) by mouth every 12 (twelve) hours 12/12/24   Patience Renee MD   ferrous sulfate 325 (65 Fe) mg tablet Take 1 tablet (325 mg total) by mouth daily with breakfast 6/2/23   Johnson Pak MD   folic acid (FOLVITE) 1 mg tablet TAKE 1 TABLET DAILY 2/26/24   Rich Finn MD   gabapentin (NEURONTIN) 300 mg capsule TAKE ONE CAPSULE THREE TIMES DAILY 9/18/24   Lilliana Bliss MD   lidocaine (Lidoderm) 5 % Apply 1 patch topically over 12 hours daily Remove & Discard patch within 12 hours or as directed by MD 12/13/24   Patience Renee MD   magnesium Oxide (MAG-OX) 400 mg TABS Take 2 tablets (800 mg total) by mouth 2 (two) times a day 11/23/24   Pamela Esquivel MD   metFORMIN (GLUCOPHAGE) 500 mg tablet TAKE 1 TABLET DAILY WITH BREAKFAST 4/7/24   Korin Lo MD   metoprolol tartrate (LOPRESSOR) 100 mg tablet Take 1.5 tablets (150 mg total) by mouth every 12 (twelve) hours 1/8/25   Brandy Connors DO   nicotine (NICODERM CQ) 21 mg/24 hr TD 24 hr patch Place 1 patch on the skin over 24 hours daily Do not start before December 4, 2023. 12/4/23   Bipin Freed MD   [facility]  LANCETS FINE MISC 3 (three) times a day Test 5/16/19   Historical Provider, MD   pantoprazole (PROTONIX) 40 mg tablet TAKE 1 TABLET TWO TIMES A DAY 9/18/24   CHELSEA Storm   ranolazine (RANEXA) 500 mg 12 hr tablet TAKE 1 TABLET EVERY 12 HOURS 4/1/24   Korin Lo MD   saccharomyces boulardii (FLORASTOR) 250 mg capsule Take 1 capsule (250 mg total) by mouth 2 (two) times a day 4/9/25   Doron Reyes MD   senna-docusate sodium (SENOKOT S) 8.6-50 mg per tablet Take 1 tablet by mouth daily as needed for constipation 1/27/24   Amilcar Garcia MD   tamsulosin (FLOMAX) 0.4 mg TAKE 1 CAPSULE DAILY 4/5/24   Korin Lo MD   Thiamine HCl (vitamin B-1) 100 MG TABS TAKE 1 TABLET DAILY 12/15/22   Angel Brennan MD     No Known Allergies    Objective :  Temp:  [97.7 °F (36.5 °C)-98.7 °F (37.1 °C)] 97.7 °F (36.5 °C)  HR:  [105-122] 114  BP: (126-169)/(78-93) 146/87  Resp:  [18-21] 21  SpO2:  [94 %-98 %] 97 %  O2 Device: Nasal cannula    Physical Exam  Constitutional:       General: He is not in acute distress.     Appearance: Normal appearance. He is normal weight. He is toxic-appearing. He is not ill-appearing.   HENT:      Head: Normocephalic and atraumatic.      Nose: Nose normal.      Mouth/Throat:      Mouth: Mucous membranes are moist.      Pharynx: Oropharynx is clear.   Eyes:      Conjunctiva/sclera: Conjunctivae normal.      Pupils: Pupils are equal, round, and reactive to light.   Cardiovascular:      Rate and Rhythm: Regular rhythm. Tachycardia present.      Pulses: Normal pulses.      Heart sounds: Normal heart sounds.   Pulmonary:      Effort: Pulmonary effort is normal.      Breath sounds: Normal breath sounds.      Comments: Increased WOB in setting of rib fractures   Abdominal:      General: Abdomen is flat. Bowel sounds are normal.      Palpations: Abdomen is soft.      Tenderness: There is no abdominal tenderness. There is no guarding or rebound.   Musculoskeletal:         General:  Normal range of motion.      Cervical back: Normal range of motion and neck supple.      Right lower leg: No edema.      Left lower leg: No edema.   Skin:     General: Skin is warm and dry.      Findings: Bruising (ecchymoses on back, lower legs, buttock) and lesion (arms, buttock, legs) present.   Neurological:      Mental Status: He is alert. He is disoriented.          Lines/Drains:            Lab Results: I have reviewed the following results:  Results from last 7 days   Lab Units 04/13/25  1329   WBC Thousand/uL 8.29   HEMOGLOBIN g/dL 11.2*   HEMATOCRIT % 33.7*   PLATELETS Thousands/uL 239   SEGS PCT % 60   LYMPHO PCT % 17   MONO PCT % 21*   EOS PCT % 1     Results from last 7 days   Lab Units 04/13/25  1329   SODIUM mmol/L 133*   POTASSIUM mmol/L 3.5   CHLORIDE mmol/L 94*   CO2 mmol/L 23   BUN mg/dL 13   CREATININE mg/dL 0.90   ANION GAP mmol/L 16*   CALCIUM mg/dL 8.0*   GLUCOSE RANDOM mg/dL 110         Results from last 7 days   Lab Units 04/13/25  2347 04/13/25  2126   POC GLUCOSE mg/dl 117 115     Lab Results   Component Value Date    HGBA1C 5.4 02/15/2025    HGBA1C 5.2 11/14/2024    HGBA1C 5.4 08/15/2024           Imaging Results Review: I personally reviewed the following image studies/reports in PACS and discussed pertinent findings with Radiology: CT chest. My interpretation of the radiology images/reports is: Acute left 10th through 12th rib fractures, as described above..  Other Study Results Review: No additional pertinent studies reviewed.    Administrative Statements   I have spent a total time of 40 minutes in caring for this patient on the day of the visit/encounter including Diagnostic results, Prognosis, Risks and benefits of tx options, Instructions for management, Patient and family education, Risk factor reductions, Impressions, Counseling / Coordination of care, Documenting in the medical record, Reviewing/placing orders in the medical record (including tests, medications, and/or  procedures), Obtaining or reviewing history  , and Communicating with other healthcare professionals .

## 2025-04-14 NOTE — CONSULTS
Consultation -     Name: Gregg Partida 65 y.o. male I MRN: [id number]  Unit/Bed#: 2 [address] Date of Admission: 4/13/2025   Date of Service: 4/14/2025 I Hospital Day: 0   Inpatient consult to Acute Care Surgery  Consult performed by: Criss Mendenhall  Consult ordered by: Bipin Freed MD        Physician Requesting Evaluation: Samuel Thompson MD   Reason for Evaluation / Principal Problem: Open laceration of the buttocks     Assessment & Plan  Open wound of buttock  AVSS with exception of tachycardia that may be the subsequent result of alcohol withdrawal, patient does also have tremor noted, patient seems more oriented now but was apparently altered when coming and does not recall his admission source very well, not a very accurate historian, does have open wound from fall, medicine team can acknowledge that he did not have this wound on historic admissions which were recent enough that he must of contrived this wound a fairly recent timeframe, patient does not endorse any significant pain, level of contamination is uncertain to the wound base, Does track relatively deep but is open to drain, mildly erythematous around the surrounding border but does not appear overwhelmingly infected, labs within normal limits, reviewed CT of chest abdomen pelvis    Plan to irrigate wound and close with interrupted staples loosely approximated to allow for drainage.  Will have Mepilex border in place supportive care wound care per wound nurse with clean dressing changes, soap and warm water.  Q2 hour turns if not more frequently. Try and offload from wound  Wound culture pending   Currently on 2g IV Ancef, does not have to be on Ancef as preventative measure for wound infection risk less General Appearance changes  Last tetanus shot UTD (2020)    Fall  Fall from ground level. CT ab/pelvis/chest showed acute fractured left 10-12th ribs.  Patient sustained laceration to the right buttocks but has no recollection what he  fell on or what caused the laceration  Surgery-General service will follow.    History of Present Illness   Gregg Partida is a 65 y.o. male with a past medical history of alcohol abuse, T2DM, prostate cancer, who presents with fall secondary to ETOH intoxication. He fell 2 days ago. Now presenting with a 4 inch long laceration with clean jagged margins suspected from the fall. He cannot recall what caused the laceration or what he could have fallen on. Associated injuries include fractured ribs shown on CT chest/ab/pelvis. Patient is not a good historian with gaps in memory. He denies acute chills/fevers.  Patient cannot remember any of the events that transpired prior to being admitted.  Patient based on what he can recall about his surroundings when he had fallen was around his house but cannot delineate if there was anything sharp or jagged or contaminated/dirty around the area where he had fallen.  Patient at this moment while sitting does not have any pain overlying the area at rest.  Patient does have some tremors of his hands while he is trying to eat.  Patient appears to express some agitation with the questioning in the evaluation.    Review of Systems   Unable to perform ROS: Mental status change (altered mental status when admitted, can't remember occurence prior to admission)   Constitutional:  Negative for chills, diaphoresis and fever.   Gastrointestinal: Negative.    Musculoskeletal:  Positive for myalgias.   Skin:  Positive for wound.   Neurological:  Positive for tremors.   Psychiatric/Behavioral:  Positive for confusion.      Medical History Review: I have reviewed the patient's PMH, PSH, Social History, Family History, Meds, and Allergies     Objective :  Temp:  [97.7 °F (36.5 °C)-99.2 °F (37.3 °C)] 99.2 °F (37.3 °C)  HR:  [] 114  BP: (100-173)/() 147/84  Resp:  [17-21] 20  SpO2:  [94 %-99 %] 94 %  O2 Device: None (Room air)      Physical Exam  Constitutional:       General: He is  not in acute distress.     Appearance: Normal appearance. He is normal weight. He is ill-appearing. He is not toxic-appearing or diaphoretic.      Interventions: He is not intubated.  HENT:      Head: Normocephalic and atraumatic.      Right Ear: Hearing and external ear normal.      Left Ear: Hearing and external ear normal.      Nose: Nose normal.   Cardiovascular:      Rate and Rhythm: Normal rate and regular rhythm.   Pulmonary:      Effort: Pulmonary effort is normal. No tachypnea, bradypnea, accessory muscle usage, prolonged expiration, respiratory distress or retractions. He is not intubated.   Skin:     General: Skin is warm and dry.      Findings: Bruising (bruising diffusely scattered on body), signs of injury (laceration on right buttock), laceration and wound present.          Neurological:      Mental Status: He is alert.      Motor: Weakness, tremor and atrophy present.   Psychiatric:         Mood and Affect: Affect is blunt.         Behavior: Behavior is agitated.               Lab Results: I have reviewed the following results:  Recent Labs     04/14/25  0616   WBC 7.64   HGB 10.6*   HCT 32.2*      SODIUM 138   K 3.8      CO2 24   BUN 9   CREATININE 0.76   GLUC 99   MG 2.1   AST 26   ALT 27   ALB 3.0*   TBILI 1.02*   ALKPHOS 87       Imaging Results Review: I reviewed radiology reports from this admission including: CT abdomen/pelvis.  Other Study Results Review: No additional pertinent studies reviewed.    VTE Pharmacologic Prophylaxis: VTE covered by:  apixaban, Oral, 5 mg at 04/14/25 0956     VTE Mechanical Prophylaxis: sequential compression device    KIN Sibley PA-C

## 2025-04-14 NOTE — OCCUPATIONAL THERAPY NOTE
"OT EVALUATION       04/14/25 1103   OT Last Visit   OT Visit Date 04/14/25   Note Type   Note type Evaluation   Pain Assessment   Pain Assessment Tool 0-10   Pain Score 8   Pain Location/Orientation Orientation: Left;Location: Rib Cage;Location: Buttocks   Restrictions/Precautions   Other Precautions Chair Alarm;Bed Alarm;Fall Risk  (10th-12th rib fractures on L s/p fall)   Home Living   Type of Home Apartment   Home Layout One level;Elevator   Bathroom Shower/Tub Tub/shower unit   Bathroom Toilet Standard   Home Equipment Walker;Cane  (rollator, uses rollator prior to admission)   Prior Function   Level of [place] Independent with ADLs;Independent with functional mobility;Independent with IADLS   Lives With Alone   IADLs Family/Friend/Other provides transportation;Independent with meal prep;Independent with medication management  (uses bus for transportation)   Falls in the last 6 months 1 to 4   Comments patient admitted with fall at home and alcohol intoxication   Subjective   Subjective \"I have pain\"   ADL   Eating Assistance 5  Supervision/Setup   Grooming Assistance 5  Supervision/Setup   UB Bathing Assistance 4  Minimal Assistance   LB Bathing Assistance 3  Moderate Assistance   UB Dressing Assistance 4  Minimal Assistance   LB Dressing Assistance 3  Moderate Assistance   Toileting Assistance  4  Minimal Assistance   Bed Mobility   Supine to Sit 4  Minimal assistance   Sit to Supine 5  Supervision   Transfers   Sit to Stand 4  Minimal assistance   Stand to Sit 4  Minimal assistance   Functional Mobility   Functional Mobility 4  Minimal assistance   Additional Comments few steps to head of bed with RW   Balance   Static Sitting Fair +   Dynamic Sitting Fair   Static Standing Fair   Dynamic Standing Fair -   Activity Tolerance   Activity Tolerance Patient limited by fatigue;Patient limited by pain   RUE Assessment   RUE Assessment WFL   LUE Assessment   LUE Assessment WFL   Cognition   Overall Cognitive " Status WFL   Arousal/Participation Cooperative   Attention Within functional limits   Orientation Level Oriented X4   Following Commands Follows one step commands with increased time or repetition   Comments impulsive at times   Assessment   Limitation Decreased ADL status;Decreased UE strength;Decreased Safe judgement during ADL;Decreased endurance;Decreased self-care trans;Decreased high-level ADLs  (decreased balance and mobility)   Prognosis Good   Assessment Patient evaluated by Occupational Therapy.  Patient admitted with Fall.  The patients occupational profile, medical and therapy history includes a extensive additional review of physical, cognitive, or psychosocial history related to current functional performance.  Comorbidities affecting functional mobility and ADLS include: alcohol abuse, HTN, COPD, cancer, CABG, AFib, CAD.  Prior to admission, patient was independent with functional mobility with rollator, independent with ADLS, and independent with IADLS.  The evaluation identifies the following performance deficits: weakness, impaired balance, decreased endurance, increased fall risk, new onset of impairment of functional mobility, decreased ADLS, decreased IADLS, pain, decreased activity tolerance, decreased safety awareness, impaired judgement, and decreased strength, that result in activity limitations and/or participation restrictions. This evaluation requires clinical decision making of high complexity, because the patient presents with comorbidites that affect occupational performance and required significant modification of tasks or assistance with consideration of multiple treatment options.  The Barthel Index was used as a functional outcome tool presenting with a score of Barthel Index Score: 40, indicating marked limitations of functional mobility and ADLS.  The patient's raw score on the AM-PAC Daily Activity Inpatient Short Form is 17. A raw score of less than 19 suggests the patient may  benefit from discharge to post-acute rehabilitation services. Please refer to the recommendation of the Occupational Therapist for safe discharge planning.  Patient will benefit from skilled Occupational Therapy services to address above deficits and facilitate a safe return to prior level of function.   Goals   Patient Goals less pain   STG Time Frame   (1-7 days)   Short Term Goal  Goals established to promote Patient Goals: less pain:  Grooming: min assist standing at sink; Bathing: min assist; Upper Body Dressing supervision; Lower Body Dressing: min assist; Toileting: supervision; Patient will increase ambulatory standard toilet transfer to min assist with rolling walker to increase performance and safety with ADLS and functional mobility; Patient will increase standing tolerance to 4 minutes during ADL task to decrease assistance level and decrease fall risk; Patient will increase bed mobility to supervision in preparation for ADLS and transfers; Patient will increase functional mobility to and from bathroom with rolling walker with min assist to increase performance with ADLS and to use a toilet; Patient will tolerate 5 minutes of UE ROM/strengthening to increase general activity tolerance and performance in ADLS/IADLS; Patient will improve functional activity tolerance to 10 minutes of sustained functional tasks to increase participation in basic self-care and decrease assistance level; Patient will increase dynamic sitting balance to fair+ to improve the ability to sit at edge of bed or on a chair for ADLS;  Patient will increase dynamic standing balance to fair to improve postural stability and decrease fall risk during standing ADLS and transfers.   LTG Time Frame   (8-14 days)   Long Term Goal Grooming: supervision standing at sink; Bathing: supervision; Upper Body Dressing independent; Lower Body Dressing: supervision; Toileting: independent; Patient will increase ambulatory standard toilet transfer to  supervision with rolling walker to increase performance and safety with ADLS and functional mobility; Patient will increase standing tolerance to 8 minutes during ADL task to decrease assistance level and decrease fall risk; Patient will increase bed mobility to independent in preparation for ADLS and transfers; Patient will increase functional mobility to and from bathroom with rolling walker with supervision to increase performance with ADLS and to use a toilet; Patient will tolerate 10 minutes of UE ROM/strengthening to increase general activity tolerance and performance in ADLS/IADLS; Patient will improve functional activity tolerance to 20 minutes of sustained functional tasks to increase participation in basic self-care and decrease assistance level; Patient will increase dynamic sitting balance to good to improve the ability to sit at edge of bed or on a chair for ADLS;  Patient will increase dynamic standing balance to fair+ to improve postural stability and decrease fall risk during standing ADLS and transfers. Pt will score >/= 21/24 on AM-PAC Daily Activity Inpatient scale to promote safe independence with ADLs and functional mobility; Pt will score >/= 70/100 on Barthel Index in order to decrease caregiver assistance needed and increase ability to perform ADLs and functional mobility.   Plan   Treatment Interventions ADL retraining;UE strengthening/ROM;Functional transfer training;Endurance training;Patient/family training;Equipment evaluation/education;Activityengagement;Compensatory technique education   Goal Expiration Date 04/28/25   OT Frequency 3-5x/wk   Discharge Recommendation   Rehab Resource Intensity Level, OT II (Moderate Resource Intensity)   AM-PAC Daily Activity Inpatient   Lower Body Dressing 2   Bathing 2   Toileting 3   Upper Body Dressing 3   Grooming 3   Eating 4   Daily Activity Raw Score 17   Daily Activity Standardized Score (Calc for Raw Score >=11) 37.26   AM-PAC Applied  Cognition Inpatient   Following a Speech/Presentation 4   Understanding Ordinary Conversation 4   Taking Medications 4   Remembering Where Things Are Placed or Put Away 4   Remembering List of 4-5 Errands 4   Taking Care of Complicated Tasks 4   Applied Cognition Raw Score 24   Applied Cognition Standardized Score 62.21   Barthel Index   Feeding 10   Bathing 0   Grooming Score 0   Dressing Score 5   Bladder Score 0   Bowels Score 10   Toilet Use Score 5   Transfers (Bed/Chair) Score 10   Mobility (Level Surface) Score 0   Stairs Score 0   Barthel Index Score 40   Licensure   [place] License Number  Ursula Reyes MS OTR/L [id number]

## 2025-04-14 NOTE — PLAN OF CARE
Problem: Potential for Falls  Goal: Patient will remain free of falls  Description: INTERVENTIONS:- Educate patient/family on patient safety including physical limitations- Instruct patient to call for assistance with activity - Consult OT/PT to assist with strengthening/mobility - Keep Call bell within reach- Keep bed low and locked with side rails adjusted as appropriate- Keep care items and personal belongings within reach- Initiate and maintain comfort rounds- Make Fall Risk Sign visible to staff- Offer Toileting every 2 Hours, in advance of need- Initiate/Maintain bed alarm- Obtain necessary fall risk management equipment: non skid socks- Apply yellow socks and bracelet for high fall risk patients- Consider moving patient to room near nurses station  INTERVENTIONS:- Educate patient/family on patient safety including physical limitations- Instruct patient to call for assistance with activity - Consult OT/PT to assist with strengthening/mobility - Keep Call bell within reach- Keep bed low and locked with side rails adjusted as appropriate- Keep care items and personal belongings within reach- Initiate and maintain comfort rounds- Make Fall Risk Sign visible to staff- Offer Toileting every 2 Hours, in advance of need- Initiate/Maintain bed alarm- Obtain necessary fall risk management equipment: non skid socks- Apply yellow socks and bracelet for high fall risk patients- Consider moving patient to room near nurses station  Outcome: Progressing     Problem: PAIN - ADULT  Goal: Verbalizes/displays adequate comfort level or baseline comfort level  Description: Interventions:- Encourage patient to monitor pain and request assistance- Assess pain using appropriate pain scale- Administer analgesics based on type and severity of pain and evaluate response- Implement non-pharmacological measures as appropriate and evaluate response- Consider cultural and social influences on pain and pain management- Notify  physician/advanced practitioner if interventions unsuccessful or patient reports new pain  Outcome: Progressing     Problem: INFECTION - ADULT  Goal: Absence or prevention of progression during hospitalization  Description: INTERVENTIONS:- Assess and monitor for signs and symptoms of infection- Monitor lab/diagnostic results- Monitor all insertion sites, i.e. indwelling lines, tubes, and drains- Monitor endotracheal if appropriate and nasal secretions for changes in amount and color- [place] appropriate cooling/warming therapies per order- Administer medications as ordered- Instruct and encourage patient and family to use good hand hygiene technique- Identify and instruct in appropriate isolation precautions for identified infection/condition  Outcome: Progressing  Goal: Absence of fever/infection during neutropenic period  Description: INTERVENTIONS:- Monitor WBC  Outcome: Progressing     Problem: SAFETY ADULT  Goal: Patient will remain free of falls  Description: INTERVENTIONS:- Educate patient/family on patient safety including physical limitations- Instruct patient to call for assistance with activity - Consult OT/PT to assist with strengthening/mobility - Keep Call bell within reach- Keep bed low and locked with side rails adjusted as appropriate- Keep care items and personal belongings within reach- Initiate and maintain comfort rounds- Make Fall Risk Sign visible to staff- Offer Toileting every 2 Hours, in advance of need- Initiate/Maintain bed alarm- Obtain necessary fall risk management equipment: non skid socks- Apply yellow socks and bracelet for high fall risk patients- Consider moving patient to room near nurses station  INTERVENTIONS:- Educate patient/family on patient safety including physical limitations- Instruct patient to call for assistance with activity - Consult OT/PT to assist with strengthening/mobility - Keep Call bell within reach- Keep bed low and locked with side rails adjusted as  appropriate- Keep care items and personal belongings within reach- Initiate and maintain comfort rounds- Make Fall Risk Sign visible to staff- Offer Toileting every 2 Hours, in advance of need- Initiate/Maintain bed alarm- Obtain necessary fall risk management equipment: non skid socks- Apply yellow socks and bracelet for high fall risk patients- Consider moving patient to room near nurses station  Outcome: Progressing  Goal: Maintain or return to baseline ADL function  Description: INTERVENTIONS:-  Assess patient's ability to carry out ADLs; assess patient's baseline for ADL function and identify physical deficits which impact ability to perform ADLs (bathing, care of mouth/teeth, toileting, grooming, dressing, etc.)- Assess/evaluate cause of self-care deficits - Assess range of motion- Assess patient's mobility; develop plan if impaired- Assess patient's need for assistive devices and provide as appropriate- Encourage maximum independence but intervene and supervise when necessary- Involve family in performance of ADLs- Assess for home care needs following discharge - Consider OT consult to assist with ADL evaluation and planning for discharge- Provide patient education as appropriate  Outcome: Progressing  Goal: Maintains/Returns to pre admission functional level  Description: INTERVENTIONS:- Perform AM-PAC 6 Click Basic Mobility/ Daily Activity assessment daily.- Set and communicate daily mobility goal to care team and patient/family/caregiver. - Collaborate with rehabilitation services on mobility goals if consulted- Perform Range of Motion 3 times a day.- Reposition patient every 2 hours.- Dangle patient 3 times a day- Stand patient 3 times a day- Ambulate patient 2 times a day- Out of bed to chair 3 times a day - Out of bed for meals 3 times a day- Out of bed for toileting- Record patient progress and toleration of activity level   Outcome: Progressing     Problem: DISCHARGE PLANNING  Goal: Discharge to home  or other facility with appropriate resources  Description: INTERVENTIONS:- Identify barriers to discharge w/patient and caregiver- Arrange for needed discharge resources and transportation as appropriate- Identify discharge learning needs (meds, wound care, etc.)- Arrange for interpretive services to assist at discharge as needed- Refer to Case Management Department for coordinating discharge planning if the patient needs post-hospital services based on physician/advanced practitioner order or complex needs related to functional status, cognitive ability, or social support system  Outcome: Progressing     Problem: Knowledge Deficit  Goal: Patient/family/caregiver demonstrates understanding of disease process, treatment plan, medications, and discharge instructions  Description: Complete learning assessment and assess knowledge base.Interventions:- Provide teaching at level of understanding- Provide teaching via preferred learning methods  Outcome: Progressing     Problem: CARDIOVASCULAR - ADULT  Goal: Maintains optimal cardiac output and hemodynamic stability  Description: INTERVENTIONS:- Monitor I/O, vital signs and rhythm- Monitor for S/S and trends of decreased cardiac output- Administer and titrate ordered vasoactive medications to optimize hemodynamic stability- Assess quality of pulses, skin color and temperature- Assess for signs of decreased coronary artery perfusion- Instruct patient to report change in severity of symptoms  Outcome: Progressing  Goal: Absence of cardiac dysrhythmias or at baseline rhythm  Description: INTERVENTIONS:- Continuous cardiac monitoring, vital signs, obtain 12 lead EKG if ordered- Administer antiarrhythmic and heart rate control medications as ordered- Monitor electrolytes and administer replacement therapy as ordered  Outcome: Progressing     Problem: RESPIRATORY - ADULT  Goal: Achieves optimal ventilation and oxygenation  Description: INTERVENTIONS:- Assess for changes in  respiratory status- Assess for changes in mentation and behavior- Position to facilitate oxygenation and minimize respiratory effort- Oxygen administered by appropriate delivery if ordered- Initiate smoking cessation education as indicated- Encourage broncho-pulmonary hygiene including cough, deep breathe, Incentive Spirometry- Assess the need for suctioning and aspirate as needed- Assess and instruct to report SOB or any respiratory difficulty- Respiratory Therapy support as indicated  Outcome: Progressing     Problem: METABOLIC, FLUID AND ELECTROLYTES - ADULT  Goal: Electrolytes maintained within normal limits  Description: INTERVENTIONS:- Monitor labs and assess patient for signs and symptoms of electrolyte imbalances- Administer electrolyte replacement as ordered- Monitor response to electrolyte replacements, including repeat lab results as appropriate- Instruct patient on fluid and nutrition as appropriate  Outcome: Progressing  Goal: Fluid balance maintained  Description: INTERVENTIONS:- Monitor labs - Monitor I/O and WT- Instruct patient on fluid and nutrition as appropriate- Assess for signs & symptoms of volume excess or deficit  Outcome: Progressing  Goal: Glucose maintained within target range  Description: INTERVENTIONS:- Monitor Blood Glucose as ordered- Assess for signs and symptoms of hyperglycemia and hypoglycemia- Administer ordered medications to maintain glucose within target range- Assess nutritional intake and initiate nutrition service referral as needed  Outcome: Progressing     Problem: SKIN/TISSUE INTEGRITY - ADULT  Goal: Skin Integrity remains intact(Skin Breakdown Prevention)  Description: Assess:-Perform Herrera assessment every shift-Clean and moisturize skin every shift-Inspect skin when repositioning, toileting, and assisting with ADLS-Assess under medical devices such as non skid socks every ahift-Assess extremities for adequate circulation and sensation Bed Management:-Have minimal  linens on bed & keep smooth, unwrinkled-Change linens as needed when moist or perspiring-Avoid sitting or lying in one position for more than 2 hours while in bed- Toileting:-Offer bedside commode-Assess for incontinence every 2-Use incontinent care products after each incontinent episode such as wipes Activity:-Mobilize patient 3 times a day-Encourage activity and walks on unit-Encourage or provide ROM exercises -Turn and reposition patient every 2 Hours-Use appropriate equipment to lift or move patient in bed-Instruct/ Assist with weight shifting every 60 min when out of bed in chair-Consider limitation of chair time 3 hour intervalsSkin Care:-Avoid use of baby powder, tape, friction and shearing, hot water or constrictive clothing-Relieve pressure over bony prominences using pillows, wedges-Do not massage red bony areasNext Steps:-Teach patient strategies to minimize risks such as mobility -Consider consults to  interdisciplinary teams such as OT/PT  Outcome: Progressing  Goal: Incision(s), wounds(s) or drain site(s) healing without S/S of infection  Description: INTERVENTIONS- Assess and document dressing, incision, wound bed, drain sites and surrounding tissue- Provide patient and family education- Perform skin care/dressing changes every shift  Outcome: Progressing  Goal: Pressure injury heals and does not worsen  Description: Interventions:- Implement low air loss mattress or specialty surface (Criteria met)- Apply silicone foam dressing- Instruct/assist with weight shifting every 60 minutes when in chair - Limit chair time to 3 hour intervals- Use special pressure reducing interventions such as waffle cushion when in chair - Apply fecal or urinary incontinence containment device - Perform passive or active ROM every 4hr- Turn and reposition patient & offload bony prominences every 2 hours - Utilize friction reducing device or surface for transfers - Consider consults to  interdisciplinary teams such as  OT/PT- Use incontinent care products after each incontinent episode such as wipes- Consider nutrition services referral as needed  Outcome: Progressing     Problem: MUSCULOSKELETAL - ADULT  Goal: Maintain or return mobility to safest level of function  Description: INTERVENTIONS:- Assess patient's ability to carry out ADLs; assess patient's baseline for ADL function and identify physical deficits which impact ability to perform ADLs (bathing, care of mouth/teeth, toileting, grooming, dressing, etc.)- Assess/evaluate cause of self-care deficits - Assess range of motion- Assess patient's mobility- Assess patient's need for assistive devices and provide as appropriate- Encourage maximum independence but intervene and supervise when necessary- Involve family in performance of ADLs- Assess for home care needs following discharge - Consider OT consult to assist with ADL evaluation and planning for discharge- Provide patient education as appropriate  Outcome: Progressing  Goal: Maintain proper alignment of affected body part  Description: INTERVENTIONS:- Support, maintain and protect limb and body alignment- Provide patient/ family with appropriate education  Outcome: Progressing

## 2025-04-14 NOTE — DISCHARGE INSTR - OTHER ORDERS
Wound Care Plan:  1-Irrigate sacrobuttock wound with normal saline & pat dry. Apply Normlgel to wound and pack with Melgisorb Plus Cavity Rope. Cover with sacral foam dressing and change daily and as needed for soilage/dislodgement.   2-Cleanse wound to right posterior forearm with normal saline or wound cleanser & pat dry. Leave steristrips in place. If they fall off, apply 3M No Sting to periwound and reapply steristrips. Cover with 1/2 ABD pad, rolled gauze and tape. Change 3x/week & as needed for soilage/dislodgement.  3-Apply Prevent barrier cream or equivalent to bilateral heels and bilateral lower legs 2x/day and as needed.  4-Float heels on 2 pillows in bed to offload pressure so heels are not in contact with mattress or pillows.  5-Use pressure redistribution cushion in chair when out of bed if able. Avoid prolonged sitting due to sacrobuttock wound.  6-Moisturize skin daily with skin nourishing cream.  7-Turn/reposition every 2 hours in bed and every hour when out of bed to chair for pressure re-distribution on skin.   8-Would recommend low air-loss pressure redistribution mattress due to sacrobuttock wound.  9-You will receive a call to schedule follow up at [facility]. No need to call unless you do not hear from [phone]      Transportation options-  [employer] - transportation provided by [facility]; call [facility] physician office to request ride several days in advance of appointment  [facility] - [phone] - call several days in advance of appointment to request transport

## 2025-04-14 NOTE — ASSESSMENT & PLAN NOTE
AVSS with exception of tachycardia that may be the subsequent result of alcohol withdrawal, patient does also have tremor noted, patient seems more oriented now but was apparently altered when coming and does not recall his admission source very well, not a very accurate historian, does have open wound from fall, medicine team can acknowledge that he did not have this wound on historic admissions which were recent enough that he must of contrived this wound a fairly recent timeframe, patient does not endorse any significant pain, level of contamination is uncertain to the wound base, Does track relatively deep but is open to drain, mildly erythematous around the surrounding border but does not appear overwhelmingly infected, labs within normal limits, reviewed CT of chest abdomen pelvis    Plan to irrigate wound and close with interrupted staples loosely approximated to allow for drainage.  Will have Mepilex border in place supportive care wound care per wound nurse with clean dressing changes, soap and warm water.  Q2 hour turns if not more frequently. Try and offload from wound  Wound culture pending   Currently on 2g IV Ancef, does not have to be on Ancef as preventative measure for wound infection risk less General Appearance changes  Last tetanus shot UTD (2020)

## 2025-04-14 NOTE — ASSESSMENT & PLAN NOTE
Patient has a h/o CAD s/p CABG. Patient is home on Aspirin 81 mg EC tablet daily, Ranolazine 500 mg Q12, and Eliquis 5 mg BID.    Continue home Aspirin 81mg   Continue home Eliquis   Held home Ranolazine  Can resume in the am

## 2025-04-14 NOTE — CONSULTS
Consultation - Cardiology   Name: Gregg Partida 65 y.o. male I MRN: [id number]  Unit/Bed#: 2 [address] Date of Admission: 4/13/2025   Date of Service: 4/14/2025 I Hospital Day: 0   Inpatient consult to Cardiology  Consult performed by: CHELSEA Diaz  Consult ordered by: Nicky Messer DO        Physician Requesting Evaluation: Samuel Thompson MD   Reason for Evaluation / Principal Problem: rapid atrial fibrillation in the setting of alcohol intoxication and medication noncompliance    Assessment & Plan  Fall  secondary to alcohol intoxication  4/13/2025 CT chest abdomen and pelvis notes acute left sided rib fractures number 10 through 12 and bilateral healing rib fractures.  Encourage alcohol cessation  CAD (coronary artery disease)  patient with history of CABG in 2024 and PCI the left main and 2014  continue Lopressor 150 mg Q 12 hours  continue aspirin 81 mg once a day  continue Ranexa 500 mg BID  Afib with RVR  patient with persistent chronic atrial fibrillation  rate improved with IV fluids and resumption of home medications  continue Lopressor 150 mg Q 12 hours  continue Cardizem  mg Q 12 hours  continue aspirin 81 mg once a day  Continue Eliquis 5 mg bid  COPD, severity to be determined (HCC)  secondary to tobacco use  continue home inhalers  Fracture of multiple ribs  secondary to fall  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 disease (HCC)  Lab Results   Component Value Date    HGBA1C 5.4 02/15/2025       Recent Labs     04/13/25  2126 04/13/25  2347 04/14/25  0615 04/14/25  0728   POCGLU 115 117 105 99       Blood Sugar Average: Last 72 hrs:  (P) 109  managed per primary team  Nicotine abuse  on nicotine replacement therapy  Alcohol withdrawal (HCC)  [facility] protocol  Electrolyte abnormality  hypokalemia and hypo magnesium secondary to EtOH use  replete per primary team  Hyponatremia  admission sodium 133, corrected overnight  Open wound of buttock  followed by wound care  nurse    History of Present Illness   Gregg Partida is a 65 y.o. male who presented to the emergency room after experiencing a fall wall intoxicated with alcohol. This time patient suffered acute left 10 through 12 rib fractures. Patient has history of previous rib fractures secondary to fall. Patient has had multiple hospital admissions secondary to alcohol intoxication with detox and rapid atrial fibrillation due to not taking his medications.    Review of Systems   Unable to perform ROS: Psychiatric disorder (Intoxicated)     Historical Information   Past Medical History:   Diagnosis Date    Acute on chronic kidney failure  (HCC)     Alcohol abuse     Alcohol withdrawal (HCC) 06/07/2019    Atrial fibrillation (HCC)     Cancer (HCC)     prostate ca,had radiation    Cardiac disease     stents,then triple bypass    COPD (chronic obstructive pulmonary disease) ([facility])     Coronary artery disease     ETOH abuse     Heart failure ([facility])     History of heart surgery     [facility] bypass [facility]    Hx of heart artery stent     2014    Hyperlipidemia     Hypertension     Hyponatremia 10/17/2019    Hypovolemic shock ([facility]) 12/22/2019    Lumbar spondylitis ([facility]) 10/13/2022    Metabolic acidosis, increased anion gap 04/21/2021    Nasal bone fracture 10/10/2022    Prostate CA ([facility])     S/P CABG x 3     2004    Sleep apnea      Past Surgical History:   Procedure Laterality Date    CARDIAC CATHETERIZATION      2 stents    CORONARY ARTERY BYPASS GRAFT      CORONARY ARTERY BYPASS GRAFT  2004    DE ARTHRD ANT INTERBODY MIN DSC CRV BELOW C2 N/A 12/16/2020    Procedure: Anterior cervical discectomy with fusion C4-C7; Posterior cervical decompression and fusion C2-T2;  Surgeon: David Rowell MD;  Location: [facility];  Service: Neurosurgery    TONSILLECTOMY       Social History     Tobacco Use    Smoking status: Every Day     Current packs/day: 1.50     Average packs/day: 1.5 packs/day for 40.0 years (60.0 ttl pk-yrs)      Types: Cigarettes    Smokeless tobacco: Never   Vaping Use    Vaping status: Never Used   Substance and Sexual Activity    Alcohol use: Yes     Alcohol/week: 4.0 standard drinks of alcohol     Types: 4 Standard drinks or equivalent per week     Comment: quart of vodka daily    Drug use: No    Sexual activity: Not Currently     E-Cigarette/Vaping    E-Cigarette Use Never User      E-Cigarette/Vaping Substances    Nicotine Yes     THC No     CBD No     Flavoring No     Other No     Unknown No      Family History   Problem Relation Age of Onset    Diabetes Mother     Uterine cancer Mother     COPD Father     Hypertension Father      Social History     Tobacco Use    Smoking status: Every Day     Current packs/day: 1.50     Average packs/day: 1.5 packs/day for 40.0 years (60.0 ttl pk-yrs)     Types: Cigarettes    Smokeless tobacco: Never   Vaping Use    Vaping status: Never Used   Substance and Sexual Activity    Alcohol use: Yes     Alcohol/week: 4.0 standard drinks of alcohol     Types: 4 Standard drinks or equivalent per week     Comment: quart of vodka daily    Drug use: No    Sexual activity: Not Currently       Current Facility-Administered Medications:     magnesium sulfate 4 g/100 mL IVPB (premix) **ADS Override Pull**,     acetaminophen (TYLENOL) tablet 650 mg, Q4H PRN    albuterol (PROVENTIL HFA,VENTOLIN HFA) inhaler 2 puff, Q4H PRN    aluminum-magnesium hydroxide-simethicone (MAALOX) oral suspension 30 mL, Q4H PRN    apixaban (ELIQUIS) tablet 5 mg, BID    aspirin chewable tablet 81 mg, Daily    ceFAZolin (ANCEF) IVPB (premix in dextrose) 2,000 mg 50 mL, Q8H, Last Rate: 2,000 mg (04/14/25 0612)    [Held by provider] diltiazem (CARDIZEM SR) 12 hr capsule 120 mg, Q12H SEDA    ferrous sulfate tablet 325 mg, Daily With Breakfast    fluticasone-vilanterol 200-25 mcg/actuation 1 puff, Daily    folic acid (FOLVITE) tablet 1,000 mcg, Daily    gabapentin (NEURONTIN) capsule 300 mg, TID    insulin lispro  (HumALOG/ADMELOG) 100 units/mL subcutaneous injection 1-5 Units, TID AC **AND** Fingerstick Glucose (POCT), TID AC    insulin lispro (HumALOG/ADMELOG) 100 units/mL subcutaneous injection 1-5 Units, HS    lactated ringers infusion, Continuous, Last Rate: 100 mL/hr (04/13/25 2202)    [Held by provider] magnesium Oxide (MAG-OX) tablet 800 mg, BID    metoprolol tartrate (LOPRESSOR) tablet 150 mg, Q12H SEDA    nicotine (NICODERM CQ) 21 mg/24 hr TD 24 hr patch 1 patch, Daily    oxyCODONE (ROXICODONE) split tablet 2.5 mg, Q4H PRN **OR** oxyCODONE (ROXICODONE) IR tablet 5 mg, Q4H PRN    pantoprazole (PROTONIX) EC tablet 40 mg, BID AC    [Held by provider] ranolazine (RANEXA) 12 hr tablet 500 mg, Q12H    saccharomyces boulardii (FLORASTOR) capsule 250 mg, BID    senna-docusate sodium (SENOKOT S) 8.6-50 mg per tablet 1 tablet, Daily PRN    [Held by provider] tamsulosin (FLOMAX) capsule 0.4 mg, Daily    thiamine tablet 100 mg, Daily  Prior to Admission Medications   Prescriptions Last Dose Informant Patient Reported? Taking?   Blood Glucose Monitoring Suppl (ONE TOUCH ULTRA MINI) w/Device KIT Past Week Self Yes Yes   Sig: Use as directed   Blood Pressure Monitoring (Adult Blood Pressure Cuff Lg) KIT Past Week  No Yes   Sig: Use in the morning   Breo Ellipta 200-25 MCG/ACT inhaler Past Month Self No Yes   Sig: Inhale 1 puff daily Rinse mouth after use.   ONETOUCH DELICA LANCETS FINE MISC Unknown Self Yes No   Sig: 3 (three) times a day Test   Thiamine HCl (vitamin B-1) 100 MG TABS Unknown Self No No   Sig: TAKE 1 TABLET DAILY   albuterol (Ventolin HFA) 90 mcg/act inhaler 4/13/2025 Self No Yes   Sig: Inhale 2 puffs every 4 (four) hours as needed for wheezing   aluminum-magnesium hydroxide-simethicone (MAALOX) 4605-6804-696 mg/30 mL suspension Unknown  No No   Sig: Take 30 mL by mouth every 4 (four) hours as needed for indigestion or heartburn   apixaban (Eliquis) 5 mg 4/12/2025  No Yes   Sig: TAKE 1 TABLET BY MOUTH 2 TIMES A DAY  DO NOT START BEFORE JANUARY 31, 2024.   aspirin (ECOTRIN LOW STRENGTH) 81 mg EC tablet Past Week Self Yes Yes   Sig: Take 81 mg by mouth daily   diltiazem (CARDIZEM SR) 120 mg 12 hr capsule Unknown  No No   Sig: Take 1 capsule (120 mg total) by mouth every 12 (twelve) hours   ferrous sulfate 325 (65 Fe) mg tablet Past Month Self No Yes   Sig: Take 1 tablet (325 mg total) by mouth daily with breakfast   folic acid (FOLVITE) 1 mg tablet Unknown  No No   Sig: TAKE 1 TABLET DAILY   gabapentin (NEURONTIN) 300 mg capsule Unknown  No No   Sig: TAKE ONE CAPSULE THREE TIMES DAILY   lidocaine (Lidoderm) 5 % Not Taking  No No   Sig: Apply 1 patch topically over 12 hours daily Remove & Discard patch within 12 hours or as directed by MD   Patient not taking: Reported on 4/13/2025   magnesium Oxide (MAG-OX) 400 mg TABS Unknown  No No   Sig: Take 2 tablets (800 mg total) by mouth 2 (two) times a day   metFORMIN (GLUCOPHAGE) 500 mg tablet Past Week  No Yes   Sig: TAKE 1 TABLET DAILY WITH BREAKFAST   metoprolol tartrate (LOPRESSOR) 100 mg tablet Past Week  No Yes   Sig: Take 1.5 tablets (150 mg total) by mouth every 12 (twelve) hours   nicotine (NICODERM CQ) 21 mg/24 hr TD 24 hr patch Not Taking Self No No   Sig: Place 1 patch on the skin over 24 hours daily Do not start before December 4, 2023.   Patient not taking: Reported on 4/13/2025   pantoprazole (PROTONIX) 40 mg tablet Past Week  No Yes   Sig: TAKE 1 TABLET TWO TIMES A DAY   ranolazine (RANEXA) 500 mg 12 hr tablet Past Week  No Yes   Sig: TAKE 1 TABLET EVERY 12 HOURS   saccharomyces boulardii (FLORASTOR) 250 mg capsule Past Week  No Yes   Sig: Take 1 capsule (250 mg total) by mouth 2 (two) times a day   senna-docusate sodium (SENOKOT S) 8.6-50 mg per tablet Past Week  No Yes   Sig: Take 1 tablet by mouth daily as needed for constipation   tamsulosin (FLOMAX) 0.4 mg Past Week  No Yes   Sig: TAKE 1 CAPSULE DAILY      Facility-Administered Medications: None     Patient has no  known allergies.    Objective :  Temp:  [97.7 °F (36.5 °C)-99.2 °F (37.3 °C)] 99.2 °F (37.3 °C)  HR:  [] 97  BP: (100-173)/() 121/66  Resp:  [17-21] 20  SpO2:  [94 %-99 %] 96 %  O2 Device: Nasal cannula  Orthostatic Blood Pressures      Flowsheet Row Most Recent Value   Blood Pressure 121/66 filed at 04/14/2025 0725   Patient Position - Orthostatic VS Lying filed at 04/13/2025 2015          First Weight: Weight - Scale: 84.1 kg (185 lb 4.8 oz) (04/13/25 2120)  Vitals:    04/13/25 2120   Weight: 84.1 kg (185 lb 4.8 oz)       Physical Exam  Vitals and nursing note reviewed.   Constitutional:       General: He is sleeping.      Appearance: He is ill-appearing (Chronically).   HENT:      Right Ear: External ear normal.      Left Ear: External ear normal.   Eyes:      General:         Right eye: No discharge.         Left eye: No discharge.   Cardiovascular:      Rate and Rhythm: Tachycardia present. Rhythm irregular.      Pulses: Normal pulses.   Pulmonary:      Effort: Pulmonary effort is normal. No respiratory distress.      Breath sounds: Normal breath sounds.   Abdominal:      General: Bowel sounds are normal. There is no distension.      Palpations: Abdomen is soft.   Musculoskeletal:      Right lower leg: No edema.      Left lower leg: No edema.   Skin:     General: Skin is warm and dry.      Capillary Refill: Capillary refill takes less than 2 seconds.   Neurological:      Mental Status: He is oriented to person, place, and time and easily aroused. Mental status is at baseline.   Psychiatric:         Mood and Affect: Mood normal.           Lab Results: I have reviewed the following results:  Results from last 7 days   Lab Units 04/14/25  0616 04/13/25  1329   WBC Thousand/uL 7.64 8.29   HEMOGLOBIN g/dL 10.6* 11.2*   HEMATOCRIT % 32.2* 33.7*   PLATELETS Thousands/uL 231 239     Results from last 7 days   Lab Units 04/14/25  0616 04/13/25  1329 04/09/25  0706   POTASSIUM mmol/L 3.8 3.5 3.5   CHLORIDE  mmol/L 102 94* 94*   CO2 mmol/L 24 23 24   BUN mg/dL 9 13 23   CREATININE mg/dL 0.76 0.90 1.05   CALCIUM mg/dL 7.8* 8.0* 8.2*         Lab Results   Component Value Date    HGBA1C 5.4 02/15/2025     Lab Results   Component Value Date    CKTOTAL 190 04/13/2025    CKMB 2.0 04/22/2021    CKMBINDEX <1.0 04/22/2021    TROPONINI <0.02 06/25/2021     Telemetry demonstrates atrial fibrillation which is chronic    VTE Prophylaxis: VTE covered by:  apixaban, Oral, 5 mg at 04/13/25 2201       Abigail TRAN  Cardiology

## 2025-04-14 NOTE — ASSESSMENT & PLAN NOTE
Patient presented with tachycardia and eventually became tachypneic.   Likely due to Afib with RVR vs. Fall w/ injury    Wound culture pending  Blood culture x 2 pending  100 mL/hr IV LR  Continue to monitor

## 2025-04-14 NOTE — PROCEDURES
"Universal Protocol:  Procedure performed by:  Consent: Verbal consent obtained. Written consent not obtained.  Risks and benefits: risks, benefits and alternatives were discussed  Consent given by: patient  Time out: Immediately prior to procedure a \"time out\" was called to verify the correct patient, procedure, equipment, support staff and site/side marked as required.  Timeout called at: 4/14/2025 6:34 PM.  Patient understanding: patient states understanding of the procedure being performed  Patient consent: the patient's understanding of the procedure matches consent given  Procedure consent: procedure consent matches procedure scheduled  Relevant documents: relevant documents present and verified  Test results: test results available and properly labeled  Site marked: the operative site was marked  Radiology Images displayed and confirmed. If images not available, report reviewed: imaging studies available  Patient identity confirmed: verbally with patient and arm band  Laceration repair    Date/Time: 4/14/2025 7:03 PM    Performed by: Gilberto Quintana PA-C  Authorized by: Gilberto Quintana PA-C  Associated wounds:   Wound 04/13/25 Traumatic Laceration Buttocks Right  Body area: lower extremity  Location details: right buttock  Laceration length: 6.5 cm  Contaminated: uncertain due to intoxication.  Foreign bodies: no foreign bodies  Tendon involvement: none  Nerve involvement: none  Vascular damage: no  Anesthesia: local infiltration (15cc lidocaine)    Anesthesia:  Local Anesthetic: lidocaine 1% without epinephrine  Anesthetic total: 15 mL    Sedation:  Patient sedated: no      Wound Dehiscence:  Superficial Wound Dehiscence: simple closure      Procedure Details:  Irrigation solution: saline (saline with betadine 1:10 parts betadine to saline ratio)  Irrigation method: syringe  Amount of cleaning: extensive (600cc irrigation)  Debridement: none  Degree of undermining: none  Skin closure: staples  Number of " sutures: 7  Approximation: loose  Approximation difficulty: complex  Dressing: mepilex Ag.  Patient tolerance: patient tolerated the procedure well with no immediate complications

## 2025-04-14 NOTE — ASSESSMENT & PLAN NOTE
Fall from ground level. CT ab/pelvis/chest showed acute fractured left 10-12th ribs.  Patient sustained laceration to the right buttocks but has no recollection what he fell on or what caused the laceration

## 2025-04-14 NOTE — ASSESSMENT & PLAN NOTE
Open wound on right buttock, approximately 2 cm deep.   Tetanus UTD, last shot in 2020.    Wound culture collected  Wound care consulted, appreciate reccs  Xray of Rt. Sacrum pending  2g IV Ancef Q8

## 2025-04-14 NOTE — ASSESSMENT & PLAN NOTE
Chronic, stable, PMH of prostate CA (2020), s/p resection 2021. Home medication flomax 0.4 mg daily     Home Flomax held in setting of Afib w/ RVR

## 2025-04-14 NOTE — ASSESSMENT & PLAN NOTE
Patient admits to fall yesterday after drinking. Patient unsure of how much he consumed and how he fell. States he hasn't had anything to eat in a few days.   CT chest/abd/pelv: Acute left 10th through 12th rib fractures, as described above. Bilateral healing rib fractures are otherwise stable since the prior exam.   CT head:  No interval change. No acute intracranial hemorrhage or depressed calvarial fracture identified   CT cervical spine: No interval change. No acute fracture or evidence for traumatic malalignment.     ED Course: 1L IV D5NS, 100 mg Thiamine, 1 mg Folic acid, 2g IV mg, 1 L IV NS     Plan:   Home inhalers continued  Incentive spirometry   Tylenol 650 mg Q4 for mild pain, Merle 2.5 mg Q4 prn for moderate pain, and 5 mg prn for severe pain   Fall precautions in place  Continue to monitor  Wound care in place

## 2025-04-14 NOTE — ASSESSMENT & PLAN NOTE
patient with persistent chronic atrial fibrillation  rate improved with IV fluids and resumption of home medications  continue Lopressor 150 mg Q 12 hours  continue Cardizem  mg Q 12 hours  continue aspirin 81 mg once a day  Continue Eliquis 5 mg bid

## 2025-04-14 NOTE — ASSESSMENT & PLAN NOTE
Patient presented to the ER after a fall, he denies any head trauma and LOC. Last drink was 1 day ago.   No tremors, sweating, AMS.  Ethanol level: 182    S/p 1L IV D5NS, 100 mg Thiamine, 1 mg Folic acid, 2g IV mg, 1 L IV NS     Plan:   Continuous IV fluids  CIWA protocol   Bedside dysphagia screen   Vlad/CHO diet as tolerated  Thiamine, folate, and multivitamins  Fall precautions  Seizure precautions

## 2025-04-14 NOTE — ASSESSMENT & PLAN NOTE
Patient had a fall 1 day ago, and sustained rib fractures. On PE, patient is using accessory muscles to breathe, and has increased WOB.   Denies chest pain and SOB    CT chest/abd/pelv:  Acute left 10th through 12th rib fractures, as described above. Bilateral healing rib fractures are otherwise stable since the prior exam. No pneumothorax.     Plan:   Pain regimen in place  Incentive spirometry   Home inhalers  Continue to monitor   Rest of plan per fall

## 2025-04-14 NOTE — ASSESSMENT & PLAN NOTE
Lab Results   Component Value Date    HGBA1C 5.4 02/15/2025       Recent Labs     04/13/25  2126 04/13/25  2347 04/14/25  0615 04/14/25  0728   POCGLU 115 117 105 99       Blood Sugar Average: Last 72 hrs:  (P) 109  managed per primary team

## 2025-04-14 NOTE — ASSESSMENT & PLAN NOTE
secondary to alcohol intoxication  4/13/2025 CT chest abdomen and pelvis notes acute left sided rib fractures number 10 through 12 and bilateral healing rib fractures.  Encourage alcohol cessation

## 2025-04-14 NOTE — WOUND OSTOMY CARE
Progress Note - Wound   Gregg Partida 65 y.o. male MRN: [id number]  Unit/Bed#: [address] [place] [address] Encounter: [id number]        Assessment:   This is a 65 year old male patient admitted on 4/13/25 s/p fall at home. Patient was AAO x 3 and agreeable to have wound visit done. He has episodes of urinary and fecal incontinence and was turned and repositioned with assist of 2 persons.    Assessment Findings:  1-Full thickness traumatic laceration to right sacrobuttock present on admission with pink granulation tissue, yellow slough and moderate serosanguinous drainage. Deepest depth of 4.5 cm recorded at lateral side of wound. Orders are in place for wound care (until any surgical procedures done). Patient seen by Dr Escalona and Gilberto FU at end of visit. Ambulatory referral to wound care placed.  2-Healed wound to left anterior lower leg - orders are in place for skin care and for protection.   3-Full thickness skin tear to right posterior forearm with pink/red granulation tissue with bleeding noted. Orders are in place for wound care.  4-Bilateral heels intact - orders are in place for skin care and for prevention.    Wound Care Plan:  1-Irrigate sacrobuttock wound with NSS & pat dry. Apply Normlgel to wound and pack with Melgisorb Plus Cavity Rope. Cover with sacral foam dressing and change daily and prn soilage/dislodgement. If order changed by Surgical Team, please discontinue this order and follow Surgical order.  2-Cleanse wound to right posterior forearm with NSS & pat dry. Leave steristrips in place. If they fall off, apply 3M No Sting to periwound and reapply steristrips. Cover with 1/2 ABD pad, rolled gauze and tape. Change every other day & prn soilage/dislodgement.  3-Apply Prevent barrier cream to bilateral heels and bilateral lower legs BID and PRN  4-Float heels on 2 pillows to offload pressure so heels are not in contact with mattress or pillows.  5-Ehob pressure redistribution cushion in chair when out of  bed if able. Avoid prolonged sitting due to sacrobuttock wound.  6-Moisturize skin daily with skin nourishing cream.  7-Turn/reposition q2h or when medically stable for pressure re-distribution on skin.   8-p-500 bed ordered today    Wound 04/13/25 Traumatic Laceration Buttocks Right (Active)   Wound Image    04/14/25 1204   Wound Description Granulation tissue;Pink;Slough;Yellow 04/14/25 1204   Non-staged Wound Description Full thickness 04/14/25 1204   Wound Length (cm) 1 cm 04/14/25 1204   Wound Width (cm) 3 cm 04/14/25 1204   Wound Depth (cm) 4.5 cm 04/14/25 1204   Wound Surface Area (cm^2) 3 cm^2 04/14/25 1204   Wound Volume (cm^3) 13.5 cm^3 04/14/25 1204   Calculated Wound Volume (cm^3) 13.5 cm^3 04/14/25 1204   Drainage Amount Moderate 04/14/25 1204   Drainage Description Serosanguineous 04/14/25 1204   Babita-wound Assessment Erythema;Excoriated 04/14/25 1204   Treatments Cleansed 04/14/25 1204   Dressing Normlgel;Melgisorb Rope;Foam 04/14/25 1204   Dressing Changed New 04/14/25 1204   Dressing Status Clean;Dry;Intact 04/14/25 1204       Wound 04/13/25 Pretibial Left (Active)   Wound Image   04/14/25 1158   Wound Description Intact;Epithelialization 04/14/25 1158   Wound Length (cm) 0 cm 04/14/25 1158   Wound Width (cm) 0 cm 04/14/25 1158   Wound Depth (cm) 0 cm 04/14/25 1158   Wound Surface Area (cm^2) 0 cm^2 04/14/25 1158   Wound Volume (cm^3) 0 cm^3 04/14/25 1158   Calculated Wound Volume (cm^3) 0 cm^3 04/14/25 1158   Drainage Amount None 04/14/25 1158   Treatments Cleansed 04/14/25 1158   Dressing Protective barrier 04/14/25 1158   Dressing Changed New 04/14/25 1158   Dressing Status Intact 04/14/25 1158       Wound 04/13/25 Arm Posterior;Right;Inferior (Active)   Wound Image   04/14/25 1146   Wound Description Granulation tissue;Pink;  Beefy red 04/14/25 1146   Non-staged Wound Description Full thickness 04/14/25 1146   Wound Length (cm) 3 cm 04/14/25 1146   Wound Width (cm) 3 cm 04/14/25 1146   Wound  Depth (cm) 0.1 cm 04/14/25 1146   Wound Surface Area (cm^2) 9 cm^2 04/14/25 1146   Wound Volume (cm^3) 0.9 cm^3 04/14/25 1146   Calculated Wound Volume (cm^3) 0.9 cm^3 04/14/25 1146   Drainage Amount Moderate 04/14/25 1146   Drainage Description Sanguineous 04/14/25 1146   Babita-wound Assessment Purple;Bleeding 04/14/25 1146   Treatments Cleansed 04/14/25 1146   Wound Site Closure Adhesive closure strips 04/14/25 1146   Dressing ABD;Dry dressing 04/14/25 1146   Dressing Changed New 04/14/25 1146   Dressing Status Clean;Dry;Intact 04/14/25 1146     Discussed assessment findings, and plan of care/recommendations with Dr Escalona, Gilberto PAROME and Madison SORIANO.    Wound care will follow along with patient throughout admission, please call or text with questions and concerns.    Recommendations written as orders.  Iraida Galvan MSN, RN, CWON

## 2025-04-14 NOTE — ASSESSMENT & PLAN NOTE
patient with history of CABG in 2024 and PCI the left main and 2014  continue Lopressor 150 mg Q 12 hours  continue aspirin 81 mg once a day  continue Ranexa 500 mg BID

## 2025-04-14 NOTE — ASSESSMENT & PLAN NOTE
Chronic stable. Not in acute exacerbation. Currently satting 98% on 4L NC in setting of multiple rib fractures after a fall.   Home on Albuterol inhaler and Breo Ellipta inhaler.    Continue home inhalers  Rest of plan per rib fractures

## 2025-04-14 NOTE — ASSESSMENT & PLAN NOTE
Chronic hyponatremia likely secondary to alcohol use. Na on admission was 133.     IVF  Trend Na with daily labs

## 2025-04-15 LAB
ALBUMIN SERPL BCG-MCNC: 3 G/DL (ref 3.5–5)
ALP SERPL-CCNC: 73 U/L (ref 34–104)
ALT SERPL W P-5'-P-CCNC: 15 U/L (ref 7–52)
ANION GAP SERPL CALCULATED.3IONS-SCNC: 11 MMOL/L (ref 4–13)
AST SERPL W P-5'-P-CCNC: 21 U/L (ref 13–39)
BASOPHILS # BLD AUTO: 0.07 THOUSANDS/ÂΜL (ref 0–0.1)
BASOPHILS NFR BLD AUTO: 1 % (ref 0–1)
BILIRUB SERPL-MCNC: 0.88 MG/DL (ref 0.2–1)
BUN SERPL-MCNC: 21 MG/DL (ref 5–25)
CALCIUM ALBUM COR SERPL-MCNC: 8.7 MG/DL (ref 8.3–10.1)
CALCIUM SERPL-MCNC: 7.9 MG/DL (ref 8.4–10.2)
CHLORIDE SERPL-SCNC: 98 MMOL/L (ref 96–108)
CO2 SERPL-SCNC: 23 MMOL/L (ref 21–32)
CREAT SERPL-MCNC: 1.13 MG/DL (ref 0.6–1.3)
EOSINOPHIL # BLD AUTO: 0.07 THOUSAND/ÂΜL (ref 0–0.61)
EOSINOPHIL NFR BLD AUTO: 1 % (ref 0–6)
ERYTHROCYTE [DISTWIDTH] IN BLOOD BY AUTOMATED COUNT: 20.1 % (ref 11.6–15.1)
GFR SERPL CREATININE-BSD FRML MDRD: 68 ML/MIN/1.73SQ M
GLUCOSE SERPL-MCNC: 119 MG/DL (ref 65–140)
GLUCOSE SERPL-MCNC: 134 MG/DL (ref 65–140)
GLUCOSE SERPL-MCNC: 149 MG/DL (ref 65–140)
GLUCOSE SERPL-MCNC: 153 MG/DL (ref 65–140)
GLUCOSE SERPL-MCNC: 160 MG/DL (ref 65–140)
HCT VFR BLD AUTO: 28.7 % (ref 36.5–49.3)
HGB BLD-MCNC: 9.2 G/DL (ref 12–17)
IMM GRANULOCYTES # BLD AUTO: 0.04 THOUSAND/UL (ref 0–0.2)
IMM GRANULOCYTES NFR BLD AUTO: 1 % (ref 0–2)
LYMPHOCYTES # BLD AUTO: 1.44 THOUSANDS/ÂΜL (ref 0.6–4.47)
LYMPHOCYTES NFR BLD AUTO: 16 % (ref 14–44)
MAGNESIUM SERPL-MCNC: 1.4 MG/DL (ref 1.9–2.7)
MCH RBC QN AUTO: 30.5 PG (ref 26.8–34.3)
MCHC RBC AUTO-ENTMCNC: 32.1 G/DL (ref 31.4–37.4)
MCV RBC AUTO: 95 FL (ref 82–98)
MONOCYTES # BLD AUTO: 1.37 THOUSAND/ÂΜL (ref 0.17–1.22)
MONOCYTES NFR BLD AUTO: 16 % (ref 4–12)
NEUTROPHILS # BLD AUTO: 5.87 THOUSANDS/ÂΜL (ref 1.85–7.62)
NEUTS SEG NFR BLD AUTO: 65 % (ref 43–75)
NRBC BLD AUTO-RTO: 0 /100 WBCS
PLATELET # BLD AUTO: 188 THOUSANDS/UL (ref 149–390)
PMV BLD AUTO: 10 FL (ref 8.9–12.7)
POTASSIUM SERPL-SCNC: 3.8 MMOL/L (ref 3.5–5.3)
PROT SERPL-MCNC: 5.8 G/DL (ref 6.4–8.4)
RBC # BLD AUTO: 3.02 MILLION/UL (ref 3.88–5.62)
SODIUM SERPL-SCNC: 132 MMOL/L (ref 135–147)
WBC # BLD AUTO: 8.86 THOUSAND/UL (ref 4.31–10.16)

## 2025-04-15 PROCEDURE — 85025 COMPLETE CBC W/AUTO DIFF WBC: CPT

## 2025-04-15 PROCEDURE — 82948 REAGENT STRIP/BLOOD GLUCOSE: CPT

## 2025-04-15 PROCEDURE — 87081 CULTURE SCREEN ONLY: CPT

## 2025-04-15 PROCEDURE — 83735 ASSAY OF MAGNESIUM: CPT

## 2025-04-15 PROCEDURE — 99232 SBSQ HOSP IP/OBS MODERATE 35: CPT | Performed by: INTERNAL MEDICINE

## 2025-04-15 PROCEDURE — 80053 COMPREHEN METABOLIC PANEL: CPT

## 2025-04-15 PROCEDURE — 99232 SBSQ HOSP IP/OBS MODERATE 35: CPT | Performed by: SPECIALIST

## 2025-04-15 RX ORDER — TAMSULOSIN HYDROCHLORIDE 0.4 MG/1
0.4 CAPSULE ORAL
Status: DISCONTINUED | OUTPATIENT
Start: 2025-04-15 | End: 2025-04-16 | Stop reason: HOSPADM

## 2025-04-15 RX ORDER — MAGNESIUM SULFATE HEPTAHYDRATE 40 MG/ML
4 INJECTION, SOLUTION INTRAVENOUS ONCE
Status: COMPLETED | OUTPATIENT
Start: 2025-04-15 | End: 2025-04-15

## 2025-04-15 RX ADMIN — RANOLAZINE 500 MG: 500 TABLET, FILM COATED, EXTENDED RELEASE ORAL at 20:08

## 2025-04-15 RX ADMIN — ASPIRIN 81 MG: 81 TABLET, CHEWABLE ORAL at 08:43

## 2025-04-15 RX ADMIN — Medication 250 MG: at 17:28

## 2025-04-15 RX ADMIN — PANTOPRAZOLE SODIUM 40 MG: 40 TABLET, DELAYED RELEASE ORAL at 15:31

## 2025-04-15 RX ADMIN — Medication 250 MG: at 08:42

## 2025-04-15 RX ADMIN — OXYCODONE HYDROCHLORIDE 5 MG: 5 TABLET ORAL at 05:47

## 2025-04-15 RX ADMIN — GABAPENTIN 300 MG: 300 CAPSULE ORAL at 20:08

## 2025-04-15 RX ADMIN — APIXABAN 5 MG: 5 TABLET, FILM COATED ORAL at 08:42

## 2025-04-15 RX ADMIN — NICOTINE 1 PATCH: 21 PATCH, EXTENDED RELEASE TRANSDERMAL at 08:46

## 2025-04-15 RX ADMIN — FLUTICASONE FUROATE AND VILANTEROL TRIFENATATE 1 PUFF: 200; 25 POWDER RESPIRATORY (INHALATION) at 08:45

## 2025-04-15 RX ADMIN — ACETAMINOPHEN 975 MG: 325 TABLET ORAL at 15:31

## 2025-04-15 RX ADMIN — ACETAMINOPHEN 975 MG: 325 TABLET ORAL at 07:38

## 2025-04-15 RX ADMIN — FOLIC ACID 1000 MCG: 1 TABLET ORAL at 08:42

## 2025-04-15 RX ADMIN — MAGNESIUM SULFATE HEPTAHYDRATE 4 G: 40 INJECTION, SOLUTION INTRAVENOUS at 09:35

## 2025-04-15 RX ADMIN — DILTIAZEM HYDROCHLORIDE 120 MG: 60 CAPSULE, EXTENDED RELEASE ORAL at 08:44

## 2025-04-15 RX ADMIN — OXYCODONE HYDROCHLORIDE 5 MG: 5 TABLET ORAL at 12:46

## 2025-04-15 RX ADMIN — ACETAMINOPHEN 975 MG: 325 TABLET ORAL at 00:01

## 2025-04-15 RX ADMIN — THIAMINE HCL TAB 100 MG 100 MG: 100 TAB at 08:42

## 2025-04-15 RX ADMIN — LIDOCAINE 1 PATCH: 700 PATCH TOPICAL at 08:45

## 2025-04-15 RX ADMIN — PANTOPRAZOLE SODIUM 40 MG: 40 TABLET, DELAYED RELEASE ORAL at 07:38

## 2025-04-15 RX ADMIN — GABAPENTIN 300 MG: 300 CAPSULE ORAL at 08:43

## 2025-04-15 RX ADMIN — SODIUM CHLORIDE, SODIUM LACTATE, POTASSIUM CHLORIDE, AND CALCIUM CHLORIDE 75 ML/HR: .6; .31; .03; .02 INJECTION, SOLUTION INTRAVENOUS at 17:36

## 2025-04-15 RX ADMIN — METOPROLOL TARTRATE 150 MG: 100 TABLET, FILM COATED ORAL at 20:12

## 2025-04-15 RX ADMIN — OXYCODONE HYDROCHLORIDE 5 MG: 5 TABLET ORAL at 21:03

## 2025-04-15 RX ADMIN — DILTIAZEM HYDROCHLORIDE 120 MG: 60 CAPSULE, EXTENDED RELEASE ORAL at 20:13

## 2025-04-15 RX ADMIN — RANOLAZINE 500 MG: 500 TABLET, FILM COATED, EXTENDED RELEASE ORAL at 08:41

## 2025-04-15 RX ADMIN — OXYCODONE HYDROCHLORIDE 5 MG: 5 TABLET ORAL at 00:38

## 2025-04-15 RX ADMIN — FERROUS SULFATE TAB 325 MG (65 MG ELEMENTAL FE) 325 MG: 325 (65 FE) TAB at 07:38

## 2025-04-15 RX ADMIN — APIXABAN 5 MG: 5 TABLET, FILM COATED ORAL at 17:28

## 2025-04-15 RX ADMIN — GABAPENTIN 300 MG: 300 CAPSULE ORAL at 15:31

## 2025-04-15 RX ADMIN — METOPROLOL TARTRATE 150 MG: 100 TABLET, FILM COATED ORAL at 08:41

## 2025-04-15 RX ADMIN — INSULIN LISPRO 1 UNITS: 100 INJECTION, SOLUTION INTRAVENOUS; SUBCUTANEOUS at 17:28

## 2025-04-15 RX ADMIN — TAMSULOSIN HYDROCHLORIDE 0.4 MG: 0.4 CAPSULE ORAL at 21:03

## 2025-04-15 NOTE — PROGRESS NOTES
Progress Note - Family Medicine   Name: Gregg Partida 65 y.o. male I MRN: [id number]  Unit/Bed#: 2 [address] Date of Admission: 4/13/2025   Date of Service: 4/15/2025 I Hospital Day: 1    Assessment & Plan  Fall  Patient admits to fall yesterday after drinking. Patient unsure of how much he consumed and how he fell. States he hasn't had anything to eat in a few days.   CT chest/abd/pelv: Acute left 10th through 12th rib fractures, as described above. Bilateral healing rib fractures are otherwise stable since the prior exam.   CT head:  No interval change. No acute intracranial hemorrhage or depressed calvarial fracture identified   CT cervical spine: No interval change. No acute fracture or evidence for traumatic malalignment.     ED Course: 1L IV D5NS, 100 mg Thiamine, 1 mg Folic acid, 2g IV mg, 1 L IV NS     Plan:   Home inhalers continued  Incentive spirometry   Tylenol 650 mg Q4 for mild pain, Merle 2.5 mg Q4 prn for moderate pain, and 5 mg prn for severe pain   Fall precautions in place  Continue to monitor  Wound care in place  SIRS (systemic inflammatory response syndrome) (HCC)  Patient presented with tachycardia and eventually became tachypneic.   Likely due to Afib with RVR vs. Fall w/ injury    Wound culture pending  Blood culture x 2 pending  100 mL/hr IV LR  Continue to monitor   Afib with RVR  Patient presented to the ED after a fall and was found to be in Afib w/ RVR.    ED Course: 125 mcg IV Digoxin    Plan:   Placed on telemetry   Continuous IV fluids  Continue home Eliquis 5 mg BID  Continue home 150 mg Metoprolol Q12  Resumed home 120 mg Cardizem Q12  Cardiology consulted, appreciate reccs  Continue to monitor  Fracture of multiple ribs  Patient had a fall 1 day ago, and sustained rib fractures. On PE, patient is using accessory muscles to breathe, and has increased WOB.   Denies chest pain and SOB    CT chest/abd/pelv:  Acute left 10th through 12th rib fractures, as described above. Bilateral  healing rib fractures are otherwise stable since the prior exam. No pneumothorax.     Plan:   Pain regimen in place  Incentive spirometry   Home inhalers  Lidocaine patch  Continue to monitor   Rest of plan per fall  Open wound of buttock  Open wound on right buttock, approximately 2 cm deep.   Irregular appearance, likely as a result of laceration from falling.  See media tab for pictures.  Tetanus UTD, last shot in 2020.    -Wound culture 4/14: 1+ polyps, no organisms seen    IV Ancef 2g Q8h (4/13-4/14)    Wound care consulted, appreciate [facility]  General Surgery consulted  S/p bedside laceration washout and repair on 4/15  Ancef discontinued  COPD, severity to be determined (HCC)  Chronic stable. Not in acute exacerbation. Currently satting 98% on 4L NC in setting of multiple rib fractures after a fall.   Home on Albuterol inhaler and Breo Ellipta inhaler.    Continue home inhalers  Rest of plan per rib fractures   History of prostate cancer  Chronic, stable, PMH of prostate CA (2020), s/p resection 2021. Home medication flomax 0.4 mg daily     Home Flomax held in setting of Afib w/ RVR   CAD (coronary artery disease)  Patient has a h/o CAD s/p CABG. Patient is home on Aspirin 81 mg EC tablet daily, Ranolazine 500 mg Q12, and Eliquis 5 mg BID.    Continue home Aspirin 81mg   Continue home Eliquis   Resumed home Ranolazine  Nicotine abuse  Chronic stable smoked 1.5 packs x day    Nicotine 21 mg / 24 hour patch  Smoking cessation encouraged   Alcohol withdrawal (HCC)  Patient presented to the ER after a fall, he denies any head trauma and LOC. Last drink was 1 day ago.   No tremors, sweating, AMS.  Ethanol level: 182    S/p 1L IV D5NS, 100 mg Thiamine, 1 mg Folic acid, 2g IV mg, 1 L IV NS     Plan:   Continuous IV fluids  CIWA protocol   Bedside dysphagia screen   Vlad/CHO diet as tolerated  Thiamine, folate, and multivitamins  Fall precautions  Seizure precautions   Hyponatremia  Chronic hyponatremia likely secondary  to alcohol use. Na on admission was 133.     IVF  Trend Na with daily labs  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 disease (HCC)  Lab Results   Component Value Date    HGBA1C 5.4 02/15/2025       Recent Labs     04/14/25  2111 04/15/25  0005 04/15/25  0635 04/15/25  0714   POCGLU 133 134 153* 134       Blood Sugar Average: Last 72 hrs:  (P) 125.6    Patient hasn't eaten in a few days according to him.    Home metformin held  ISS  POCT fingerstick Q6  Hypoglycemia protocol  Electrolyte abnormality  Patient was found to have hypomagnesemia on admission.   In the ED, received 2g IV Mg    Labs: Mg 0.9, K 3.5     Given 4g IV Mg  Given 40 mEq K  Replete as needed     VTE Pharmacologic Prophylaxis: VTE Score: 3 High Risk (Score >/= 5) - Pharmacological DVT Prophylaxis Ordered: apixaban (Eliquis). Sequential Compression Devices Ordered.    Mobility:   Basic Mobility Inpatient Raw Score: 10  JH-HLM Goal: 4: Move to chair/commode  JH-HLM Achieved: 2: Bed activities/Dependent transfer  JH-HLM Goal NOT achieved. Continue with multidisciplinary rounding and encourage appropriate mobility to improve upon JH-HLM goals.    Patient Centered Rounds: I performed bedside rounds with nursing staff today.   Discussions with Specialists or Other Care Team Provider: Cardiology    Education and Discussions with Family / Patient:  Will attempt to call .     Current Length of Stay: 1 day(s)  Current Patient Status: Inpatient   Certification Statement: The patient will continue to require additional inpatient hospital stay due to alcohol withdrawal and s/p fall  Discharge Plan: Anticipate discharge in 24-48 hrs to home.    Code Status: Level 1 - Full Code    Subjective   Patient seen and examined at bedside. Reports he is feeling alright but still endorses some left rib pains. Denies any urgent concerns. Expresses interest in going home as soon as possible. Denies chest pain, shortness of breath, nausea/vomiting.      Objective :  Temp:  [98.2 °F (36.8 °C)-99.7 °F (37.6 °C)] 99.7 °F (37.6 °C)  HR:  [] 64  BP: ()/(56-84) 111/62  Resp:  [17-18] 18  SpO2:  [94 %-97 %] 95 %    Body mass index is 23.79 kg/m².     Input and Output Summary (last 24 hours):     Intake/Output Summary (Last 24 hours) at 4/15/2025 0846  Last data filed at 4/15/2025 0601  Gross per 24 hour   Intake 500 ml   Output 900 ml   Net -400 ml       Physical Exam  Vitals and nursing note reviewed.   Constitutional:       General: He is sleeping.   HENT:      Head: Normocephalic and atraumatic.      Nose: Nose normal.      Mouth/Throat:      Mouth: Mucous membranes are dry.      Pharynx: Oropharynx is clear.   Eyes:      Conjunctiva/sclera: Conjunctivae normal.   Cardiovascular:      Rate and Rhythm: Normal rate. Rhythm irregular.   Pulmonary:      Effort: Pulmonary effort is normal. No respiratory distress.      Breath sounds: Normal breath sounds.      Comments: Decreased lung sounds likely secondary to not taking full breaths due to lung pain  Abdominal:      General: Abdomen is flat. Bowel sounds are normal.      Palpations: Abdomen is soft.      Tenderness: There is no abdominal tenderness.   Musculoskeletal:         General: No signs of injury.      Cervical back: Neck supple.      Right lower leg: No edema.      Left lower leg: No edema.   Skin:     Comments: Right buttock wound dressing in place, no surrounding erythema or inflammation noted   Neurological:      Mental Status: He is oriented to person, place, and time. Mental status is at baseline.           Lines/Drains:  NONE      Telemetry:  Telemetry Orders (From admission, onward)               24 Hour Telemetry Monitoring  Continuous x 24 Hours (Telem)        Question:  Reason for 24 Hour Telemetry  Answer:  Arrhythmias requiring acute medical intervention / PPM or ICD malfunction                     Telemetry Reviewed: Atrial fibrillation. HR averaging 80  Indication for Continued  Telemetry Use: Arrthymias requiring medical therapy               Lab Results: I have reviewed the following results:   Results from last 7 days   Lab Units 04/15/25  0553   WBC Thousand/uL 8.86   HEMOGLOBIN g/dL 9.2*   HEMATOCRIT % 28.7*   PLATELETS Thousands/uL 188   SEGS PCT % 65   LYMPHO PCT % 16   MONO PCT % 16*   EOS PCT % 1     Results from last 7 days   Lab Units 04/15/25  0553   SODIUM mmol/L 132*   POTASSIUM mmol/L 3.8   CHLORIDE mmol/L 98   CO2 mmol/L 23   BUN mg/dL 21   CREATININE mg/dL 1.13   ANION GAP mmol/L 11   CALCIUM mg/dL 7.9*   ALBUMIN g/dL 3.0*   TOTAL BILIRUBIN mg/dL 0.88   ALK PHOS U/L 73   ALT U/L 15   AST U/L 21   GLUCOSE RANDOM mg/dL 134         Results from last 7 days   Lab Units 04/15/25  0714 04/15/25  0635 04/15/25  0005 04/14/25  2111 04/14/25  1556 04/14/25  1042 04/14/25  0728 04/14/25  0615 04/13/25  2347 04/13/25  2126   POC GLUCOSE mg/dl 134 153* 134 133 118 148* 99 105 117 115               Recent Cultures (last 7 days):   Results from last 7 days   Lab Units 04/14/25  0038 04/13/25  2343   BLOOD CULTURE   --  Received in Microbiology Lab. Culture in Progress.  Received in Microbiology Lab. Culture in Progress.   GRAM STAIN RESULT  1+ Polys  No organisms seen  --              Last 24 Hours Medication List:     Current Facility-Administered Medications:     acetaminophen (TYLENOL) tablet 975 mg, Q8H    albuterol (PROVENTIL HFA,VENTOLIN HFA) inhaler 2 puff, Q4H PRN    aluminum-magnesium hydroxide-simethicone (MAALOX) oral suspension 30 mL, Q4H PRN    apixaban (ELIQUIS) tablet 5 mg, BID    aspirin chewable tablet 81 mg, Daily    diltiazem (CARDIZEM SR) 12 hr capsule 120 mg, Q12H SEDA    ferrous sulfate tablet 325 mg, Daily With Breakfast    fluticasone-vilanterol 200-25 mcg/actuation 1 puff, Daily    folic acid (FOLVITE) tablet 1,000 mcg, Daily    gabapentin (NEURONTIN) capsule 300 mg, TID    HYDROmorphone HCl (DILAUDID) injection 0.2 mg, Q8H PRN    insulin lispro  (HumALOG/ADMELOG) 100 units/mL subcutaneous injection 1-5 Units, TID AC **AND** Fingerstick Glucose (POCT), TID AC    insulin lispro (HumALOG/ADMELOG) 100 units/mL subcutaneous injection 1-5 Units, HS    lactated ringers infusion, Continuous, Last Rate: 75 mL/hr (04/14/25 9714)    lidocaine (LIDODERM) 5 % patch 1 patch, Daily    [Held by provider] magnesium Oxide (MAG-OX) tablet 800 mg, BID    metoprolol tartrate (LOPRESSOR) tablet 150 mg, Q12H SEDA    nicotine (NICODERM CQ) 21 mg/24 hr TD 24 hr patch 1 patch, Daily    oxyCODONE (ROXICODONE) split tablet 2.5 mg, Q4H PRN **OR** oxyCODONE (ROXICODONE) IR tablet 5 mg, Q4H PRN    pantoprazole (PROTONIX) EC tablet 40 mg, BID AC    ranolazine (RANEXA) 12 hr tablet 500 mg, Q12H    saccharomyces boulardii (FLORASTOR) capsule 250 mg, BID    senna-docusate sodium (SENOKOT S) 8.6-50 mg per tablet 1 tablet, Daily PRN    [Held by provider] tamsulosin (FLOMAX) capsule 0.4 mg, Daily    thiamine tablet 100 mg, Daily    Administrative Statements   Today, Patient Was Seen By: Patience Renee MD      **Please Note: This note may have been constructed using a voice recognition system.**

## 2025-04-15 NOTE — ASSESSMENT & PLAN NOTE
S/p bedside closure with staple 04/14/2025    Wound is clean dry and intact.   Q2 hour turns if not more frequently. Try and offload from wound  Wound culture pending   Currently on 2g IV Ancef, does not have to be on Ancef as preventative measure for wound infection risk less General Appearance changes  Last tetanus shot UTD (2020)

## 2025-04-15 NOTE — PROGRESS NOTES
Progress Note - Surgery-General   Name: Gregg Partida 65 y.o. male I MRN: [id number]  Unit/Bed#: 2 [place] [address] I Date of Admission: 4/13/2025   Date of Service: 4/15/2025 I Hospital Day: 1     Assessment & Plan  Open wound of buttock  S/p bedside closure with staple 04/14/2025    Wound is clean dry and intact.   Q2 hour turns if not more frequently. Try and offload from wound  Wound culture pending   Currently on 2g IV Ancef, does not have to be on Ancef as preventative measure for wound infection risk less General Appearance changes  Last tetanus shot UTD (2020)    Fall  Fall from ground level. CT ab/pelvis/chest showed acute fractured left 10-12th ribs.  Patient sustained laceration to the right buttocks but has no recollection what he fell on or what caused the laceration        Subjective   Patient was seen and examined bedside. Patient denies pain or drainage from wound.     Objective :  Temp:  [98.2 °F (36.8 °C)-99.7 °F (37.6 °C)] 99.7 °F (37.6 °C)  HR:  [64-92] 64  BP: ()/(56-73) 111/62  Resp:  [17-18] 18  SpO2:  [95 %-97 %] 95 %    I/O         04/13 0701  04/14 0700 04/14 0701  04/15 0700 04/15 0701  04/16 0700    P.O. 480 500     IV Piggyback 1550      Total Intake(mL/kg) 2030 (24.1) 500 (5.9)     Urine (mL/kg/hr)  900 (0.4)     Total Output  900     Net +2030 -400                    Physical Exam  Right buttock wound clean dry and intact.     Lab Results: I have reviewed the following results:  Recent Labs     04/15/25  0553   WBC 8.86   HGB 9.2*   HCT 28.7*      SODIUM 132*   K 3.8   CL 98   CO2 23   BUN 21   CREATININE 1.13   GLUC 134   MG 1.4*   AST 21   ALT 15   ALB 3.0*   TBILI 0.88   ALKPHOS 73         VTE Pharmacologic Prophylaxis: VTE covered by:  apixaban, Oral, 5 mg at 04/15/25 0842     VTE Mechanical Prophylaxis: sequential compression device

## 2025-04-15 NOTE — ASSESSMENT & PLAN NOTE
Open wound on right buttock, approximately 2 cm deep.   Irregular appearance, likely as a result of laceration from falling.  See media tab for pictures.  Tetanus UTD, last shot in 2020.    -Wound culture 4/14: 1+ polyps, no organisms seen    IV Ancef 2g Q8h (4/13-4/14)    Wound care consulted, appreciate [facility]  General Surgery consulted  S/p bedside laceration washout and repair on 4/15  Ancef discontinued

## 2025-04-15 NOTE — ASSESSMENT & PLAN NOTE
Patient presented to the ED after a fall and was found to be in Afib w/ RVR.    ED Course: 125 mcg IV Digoxin    Plan:   Placed on telemetry   Continuous IV fluids  Continue home Eliquis 5 mg BID  Continue home 150 mg Metoprolol Q12  Resumed home 120 mg Cardizem Q12  Cardiology consulted, appreciate reccs  Continue to monitor

## 2025-04-15 NOTE — PLAN OF CARE
Problem: Potential for Falls  Goal: Patient will remain free of falls  Description: INTERVENTIONS:- Educate patient/family on patient safety including physical limitations- Instruct patient to call for assistance with activity - Consult OT/PT to assist with strengthening/mobility - Keep Call bell within reach- Keep bed low and locked with side rails adjusted as appropriate- Keep care items and personal belongings within reach- Initiate and maintain comfort rounds- Make Fall Risk Sign visible to staff- Offer Toileting every 2 Hours, in advance of need- Initiate/Maintain bed alarm- Obtain necessary fall risk management equipment: non skid socks- Apply yellow socks and bracelet for high fall risk patients- Consider moving patient to room near nurses station  INTERVENTIONS:- Educate patient/family on patient safety including physical limitations- Instruct patient to call for assistance with activity - Consult OT/PT to assist with strengthening/mobility - Keep Call bell within reach- Keep bed low and locked with side rails adjusted as appropriate- Keep care items and personal belongings within reach- Initiate and maintain comfort rounds- Make Fall Risk Sign visible to staff- Offer Toileting every 2 Hours, in advance of need- Initiate/Maintain bed alarm- Obtain necessary fall risk management equipment: non skid socks- Apply yellow socks and bracelet for high fall risk patients- Consider moving patient to room near nurses station  Outcome: Progressing     Problem: PAIN - ADULT  Goal: Verbalizes/displays adequate comfort level or baseline comfort level  Description: Interventions:- Encourage patient to monitor pain and request assistance- Assess pain using appropriate pain scale- Administer analgesics based on type and severity of pain and evaluate response- Implement non-pharmacological measures as appropriate and evaluate response- Consider cultural and social influences on pain and pain management- Notify  physician/advanced practitioner if interventions unsuccessful or patient reports new pain  Outcome: Progressing     Problem: INFECTION - ADULT  Goal: Absence or prevention of progression during hospitalization  Description: INTERVENTIONS:- Assess and monitor for signs and symptoms of infection- Monitor lab/diagnostic results- Monitor all insertion sites, i.e. indwelling lines, tubes, and drains- Monitor endotracheal if appropriate and nasal secretions for changes in amount and color- [place] appropriate cooling/warming therapies per order- Administer medications as ordered- Instruct and encourage patient and family to use good hand hygiene technique- Identify and instruct in appropriate isolation precautions for identified infection/condition  Outcome: Progressing  Goal: Absence of fever/infection during neutropenic period  Description: INTERVENTIONS:- Monitor WBC  Outcome: Progressing     Problem: SAFETY ADULT  Goal: Patient will remain free of falls  Description: INTERVENTIONS:- Educate patient/family on patient safety including physical limitations- Instruct patient to call for assistance with activity - Consult OT/PT to assist with strengthening/mobility - Keep Call bell within reach- Keep bed low and locked with side rails adjusted as appropriate- Keep care items and personal belongings within reach- Initiate and maintain comfort rounds- Make Fall Risk Sign visible to staff- Offer Toileting every 2 Hours, in advance of need- Initiate/Maintain bed alarm- Obtain necessary fall risk management equipment: non skid socks- Apply yellow socks and bracelet for high fall risk patients- Consider moving patient to room near nurses station  INTERVENTIONS:- Educate patient/family on patient safety including physical limitations- Instruct patient to call for assistance with activity - Consult OT/PT to assist with strengthening/mobility - Keep Call bell within reach- Keep bed low and locked with side rails adjusted as  appropriate- Keep care items and personal belongings within reach- Initiate and maintain comfort rounds- Make Fall Risk Sign visible to staff- Offer Toileting every 2 Hours, in advance of need- Initiate/Maintain bed alarm- Obtain necessary fall risk management equipment: non skid socks- Apply yellow socks and bracelet for high fall risk patients- Consider moving patient to room near nurses station  Outcome: Progressing  Goal: Maintain or return to baseline ADL function  Description: INTERVENTIONS:-  Assess patient's ability to carry out ADLs; assess patient's baseline for ADL function and identify physical deficits which impact ability to perform ADLs (bathing, care of mouth/teeth, toileting, grooming, dressing, etc.)- Assess/evaluate cause of self-care deficits - Assess range of motion- Assess patient's mobility; develop plan if impaired- Assess patient's need for assistive devices and provide as appropriate- Encourage maximum independence but intervene and supervise when necessary- Involve family in performance of ADLs- Assess for home care needs following discharge - Consider OT consult to assist with ADL evaluation and planning for discharge- Provide patient education as appropriate  Outcome: Progressing  Goal: Maintains/Returns to pre admission functional level  Description: INTERVENTIONS:- Perform AM-PAC 6 Click Basic Mobility/ Daily Activity assessment daily.- Set and communicate daily mobility goal to care team and patient/family/caregiver. - Collaborate with rehabilitation services on mobility goals if consulted- Perform Range of Motion 3 times a day.- Reposition patient every 2 hours.- Dangle patient 3 times a day- Stand patient 3 times a day- Ambulate patient 2 times a day- Out of bed to chair 3 times a day - Out of bed for meals 3 times a day- Out of bed for toileting- Record patient progress and toleration of activity level   Outcome: Progressing     Problem: DISCHARGE PLANNING  Goal: Discharge to home  or other facility with appropriate resources  Description: INTERVENTIONS:- Identify barriers to discharge w/patient and caregiver- Arrange for needed discharge resources and transportation as appropriate- Identify discharge learning needs (meds, wound care, etc.)- Arrange for interpretive services to assist at discharge as needed- Refer to Case Management Department for coordinating discharge planning if the patient needs post-hospital services based on physician/advanced practitioner order or complex needs related to functional status, cognitive ability, or social support system  Outcome: Progressing     Problem: Knowledge Deficit  Goal: Patient/family/caregiver demonstrates understanding of disease process, treatment plan, medications, and discharge instructions  Description: Complete learning assessment and assess knowledge base.Interventions:- Provide teaching at level of understanding- Provide teaching via preferred learning methods  Outcome: Progressing     Problem: CARDIOVASCULAR - ADULT  Goal: Maintains optimal cardiac output and hemodynamic stability  Description: INTERVENTIONS:- Monitor I/O, vital signs and rhythm- Monitor for S/S and trends of decreased cardiac output- Administer and titrate ordered vasoactive medications to optimize hemodynamic stability- Assess quality of pulses, skin color and temperature- Assess for signs of decreased coronary artery perfusion- Instruct patient to report change in severity of symptoms  Outcome: Progressing  Goal: Absence of cardiac dysrhythmias or at baseline rhythm  Description: INTERVENTIONS:- Continuous cardiac monitoring, vital signs, obtain 12 lead EKG if ordered- Administer antiarrhythmic and heart rate control medications as ordered- Monitor electrolytes and administer replacement therapy as ordered  Outcome: Progressing     Problem: RESPIRATORY - ADULT  Goal: Achieves optimal ventilation and oxygenation  Description: INTERVENTIONS:- Assess for changes in  respiratory status- Assess for changes in mentation and behavior- Position to facilitate oxygenation and minimize respiratory effort- Oxygen administered by appropriate delivery if ordered- Initiate smoking cessation education as indicated- Encourage broncho-pulmonary hygiene including cough, deep breathe, Incentive Spirometry- Assess the need for suctioning and aspirate as needed- Assess and instruct to report SOB or any respiratory difficulty- Respiratory Therapy support as indicated  Outcome: Progressing     Problem: METABOLIC, FLUID AND ELECTROLYTES - ADULT  Goal: Electrolytes maintained within normal limits  Description: INTERVENTIONS:- Monitor labs and assess patient for signs and symptoms of electrolyte imbalances- Administer electrolyte replacement as ordered- Monitor response to electrolyte replacements, including repeat lab results as appropriate- Instruct patient on fluid and nutrition as appropriate  Outcome: Progressing  Goal: Fluid balance maintained  Description: INTERVENTIONS:- Monitor labs - Monitor I/O and WT- Instruct patient on fluid and nutrition as appropriate- Assess for signs & symptoms of volume excess or deficit  Outcome: Progressing  Goal: Glucose maintained within target range  Description: INTERVENTIONS:- Monitor Blood Glucose as ordered- Assess for signs and symptoms of hyperglycemia and hypoglycemia- Administer ordered medications to maintain glucose within target range- Assess nutritional intake and initiate nutrition service referral as needed  Outcome: Progressing     Problem: SKIN/TISSUE INTEGRITY - ADULT  Goal: Skin Integrity remains intact(Skin Breakdown Prevention)  Description: Assess:-Perform Herrera assessment every shift-Clean and moisturize skin every shift-Inspect skin when repositioning, toileting, and assisting with ADLS-Assess under medical devices such as non skid socks every ahift-Assess extremities for adequate circulation and sensation Bed Management:-Have minimal  linens on bed & keep smooth, unwrinkled-Change linens as needed when moist or perspiring-Avoid sitting or lying in one position for more than 2 hours while in bed- Toileting:-Offer bedside commode-Assess for incontinence every 2-Use incontinent care products after each incontinent episode such as wipes Activity:-Mobilize patient 3 times a day-Encourage activity and walks on unit-Encourage or provide ROM exercises -Turn and reposition patient every 2 Hours-Use appropriate equipment to lift or move patient in bed-Instruct/ Assist with weight shifting every 60 min when out of bed in chair-Consider limitation of chair time 3 hour intervalsSkin Care:-Avoid use of baby powder, tape, friction and shearing, hot water or constrictive clothing-Relieve pressure over bony prominences using pillows, wedges-Do not massage red bony areasNext Steps:-Teach patient strategies to minimize risks such as mobility -Consider consults to  interdisciplinary teams such as OT/PT  Outcome: Progressing  Goal: Incision(s), wounds(s) or drain site(s) healing without S/S of infection  Description: INTERVENTIONS- Assess and document dressing, incision, wound bed, drain sites and surrounding tissue- Provide patient and family education- Perform skin care/dressing changes every shift  Outcome: Progressing  Goal: Pressure injury heals and does not worsen  Description: Interventions:- Implement low air loss mattress or specialty surface (Criteria met)- Apply silicone foam dressing- Instruct/assist with weight shifting every 60 minutes when in chair - Limit chair time to 3 hour intervals- Use special pressure reducing interventions such as waffle cushion when in chair - Apply fecal or urinary incontinence containment device - Perform passive or active ROM every 4hr- Turn and reposition patient & offload bony prominences every 2 hours - Utilize friction reducing device or surface for transfers - Consider consults to  interdisciplinary teams such as  OT/PT- Use incontinent care products after each incontinent episode such as wipes- Consider nutrition services referral as needed  Outcome: Progressing     Problem: MUSCULOSKELETAL - ADULT  Goal: Maintain or return mobility to safest level of function  Description: INTERVENTIONS:- Assess patient's ability to carry out ADLs; assess patient's baseline for ADL function and identify physical deficits which impact ability to perform ADLs (bathing, care of mouth/teeth, toileting, grooming, dressing, etc.)- Assess/evaluate cause of self-care deficits - Assess range of motion- Assess patient's mobility- Assess patient's need for assistive devices and provide as appropriate- Encourage maximum independence but intervene and supervise when necessary- Involve family in performance of ADLs- Assess for home care needs following discharge - Consider OT consult to assist with ADL evaluation and planning for discharge- Provide patient education as appropriate  Outcome: Progressing  Goal: Maintain proper alignment of affected body part  Description: INTERVENTIONS:- Support, maintain and protect limb and body alignment- Provide patient/ family with appropriate education  Outcome: Progressing

## 2025-04-15 NOTE — ASSESSMENT & PLAN NOTE
Chronic stable. Not in acute exacerbation. Currently satting 98% on 4L NC in setting of multiple rib fractures after a fall.   Home on Albuterol inhaler and Breo Ellipta inhaler.    Continue home inhalers  Rest of plan per rib fractures    No

## 2025-04-15 NOTE — ASSESSMENT & PLAN NOTE
Patient had a fall 1 day ago, and sustained rib fractures. On PE, patient is using accessory muscles to breathe, and has increased WOB.   Denies chest pain and SOB    CT chest/abd/pelv:  Acute left 10th through 12th rib fractures, as described above. Bilateral healing rib fractures are otherwise stable since the prior exam. No pneumothorax.     Plan:   Pain regimen in place  Incentive spirometry   Home inhalers  Lidocaine patch  Continue to monitor   Rest of plan per fall

## 2025-04-15 NOTE — ASSESSMENT & PLAN NOTE
Patient has a h/o CAD s/p CABG. Patient is home on Aspirin 81 mg EC tablet daily, Ranolazine 500 mg Q12, and Eliquis 5 mg BID.    Continue home Aspirin 81mg   Continue home Eliquis   Resumed home Ranolazine

## 2025-04-15 NOTE — ASSESSMENT & PLAN NOTE
Patient was found to have hypomagnesemia on admission.   In the ED, received 2g IV Mg    Labs: Mg 0.9, K 3.5     Given 4g IV Mg  Given 40 mEq K  Replete as needed    Raya

## 2025-04-15 NOTE — PHYSICAL THERAPY NOTE
PT Cancellation Note       04/15/25 1101   Note Type   Note type Cancelled Session   Cancel Reasons Refusal   Additional Comments Attempted to see pt this AM for PT evaluation. Pt declined, requesting PT return after lunch. Will follow-up as schedule allows.   Licensure   [place] License Number  Ursula Anne [id number]

## 2025-04-15 NOTE — PLAN OF CARE
Problem: Potential for Falls  Goal: Patient will remain free of falls  Description: INTERVENTIONS:- Educate patient/family on patient safety including physical limitations- Instruct patient to call for assistance with activity - Consult OT/PT to assist with strengthening/mobility - Keep Call bell within reach- Keep bed low and locked with side rails adjusted as appropriate- Keep care items and personal belongings within reach- Initiate and maintain comfort rounds- Make Fall Risk Sign visible to staff- Offer Toileting every 2 Hours, in advance of need- Initiate/Maintain bed alarm- Obtain necessary fall risk management equipment: non skid socks- Apply yellow socks and bracelet for high fall risk patients- Consider moving patient to room near nurses station  INTERVENTIONS:- Educate patient/family on patient safety including physical limitations- Instruct patient to call for assistance with activity - Consult OT/PT to assist with strengthening/mobility - Keep Call bell within reach- Keep bed low and locked with side rails adjusted as appropriate- Keep care items and personal belongings within reach- Initiate and maintain comfort rounds- Make Fall Risk Sign visible to staff- Offer Toileting every 2 Hours, in advance of need- Initiate/Maintain bed alarm- Obtain necessary fall risk management equipment: non skid socks- Apply yellow socks and bracelet for high fall risk patients- Consider moving patient to room near nurses station  Outcome: Progressing     Problem: PAIN - ADULT  Goal: Verbalizes/displays adequate comfort level or baseline comfort level  Description: Interventions:- Encourage patient to monitor pain and request assistance- Assess pain using appropriate pain scale- Administer analgesics based on type and severity of pain and evaluate response- Implement non-pharmacological measures as appropriate and evaluate response- Consider cultural and social influences on pain and pain management- Notify  physician/advanced practitioner if interventions unsuccessful or patient reports new pain  Outcome: Progressing

## 2025-04-15 NOTE — ASSESSMENT & PLAN NOTE
Lab Results   Component Value Date    HGBA1C 5.4 02/15/2025       Recent Labs     04/14/25  2111 04/15/25  0005 04/15/25  0635 04/15/25  0714   POCGLU 133 134 153* 134       Blood Sugar Average: Last 72 hrs:  (P) 125.6    Patient hasn't eaten in a few days according to him.    Home metformin held  ISS  POCT fingerstick Q6  Hypoglycemia protocol

## 2025-04-16 ENCOUNTER — TRANSITIONAL CARE MANAGEMENT (OUTPATIENT)
Age: 65
End: 2025-04-16

## 2025-04-16 VITALS
DIASTOLIC BLOOD PRESSURE: 57 MMHG | HEART RATE: 60 BPM | OXYGEN SATURATION: 93 % | RESPIRATION RATE: 18 BRPM | BODY MASS INDEX: 23.78 KG/M2 | TEMPERATURE: 98.8 F | SYSTOLIC BLOOD PRESSURE: 111 MMHG | HEIGHT: 74 IN | WEIGHT: 185.3 LBS

## 2025-04-16 PROBLEM — Z86.79 HISTORY OF ATRIAL FIBRILLATION: Status: RESOLVED | Noted: 2021-10-25 | Resolved: 2025-04-16

## 2025-04-16 PROBLEM — R65.10 SIRS (SYSTEMIC INFLAMMATORY RESPONSE SYNDROME) (HCC): Status: RESOLVED | Noted: 2019-11-23 | Resolved: 2025-04-16

## 2025-04-16 PROBLEM — R79.89 ELEVATED SERUM CREATININE: Status: ACTIVE | Noted: 2025-04-16
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
## 2025-04-16 PROBLEM — E87.8 ELECTROLYTE ABNORMALITY: Status: RESOLVED | Noted: 2019-10-17 | Resolved: 2025-04-16

## 2025-04-16 PROBLEM — S31.809A OPEN WOUND OF BUTTOCK: Status: RESOLVED | Noted: 2025-04-13 | Resolved: 2025-04-16

## 2025-04-16 PROBLEM — F10.939 ALCOHOL WITHDRAWAL (HCC): Status: RESOLVED | Noted: 2019-10-17 | Resolved: 2025-04-16

## 2025-04-16 PROBLEM — E83.42 HYPOMAGNESEMIA: Status: ACTIVE | Noted: 2025-04-16

## 2025-04-16 LAB
ANION GAP SERPL CALCULATED.3IONS-SCNC: 11 MMOL/L (ref 4–13)
BASOPHILS # BLD AUTO: 0.08 THOUSANDS/ÂΜL (ref 0–0.1)
BASOPHILS NFR BLD AUTO: 1 % (ref 0–1)
BUN SERPL-MCNC: 22 MG/DL (ref 5–25)
CALCIUM SERPL-MCNC: 8 MG/DL (ref 8.4–10.2)
CHLORIDE SERPL-SCNC: 98 MMOL/L (ref 96–108)
CO2 SERPL-SCNC: 22 MMOL/L (ref 21–32)
CREAT SERPL-MCNC: 1.03 MG/DL (ref 0.6–1.3)
EOSINOPHIL # BLD AUTO: 0.04 THOUSAND/ÂΜL (ref 0–0.61)
EOSINOPHIL NFR BLD AUTO: 0 % (ref 0–6)
ERYTHROCYTE [DISTWIDTH] IN BLOOD BY AUTOMATED COUNT: 19.9 % (ref 11.6–15.1)
GFR SERPL CREATININE-BSD FRML MDRD: 76 ML/MIN/1.73SQ M
GLUCOSE SERPL-MCNC: 115 MG/DL (ref 65–140)
GLUCOSE SERPL-MCNC: 118 MG/DL (ref 65–140)
GLUCOSE SERPL-MCNC: 130 MG/DL (ref 65–140)
GLUCOSE SERPL-MCNC: 135 MG/DL (ref 65–140)
GLUCOSE SERPL-MCNC: 159 MG/DL (ref 65–140)
HCT VFR BLD AUTO: 28.7 % (ref 36.5–49.3)
HGB BLD-MCNC: 9.4 G/DL (ref 12–17)
IMM GRANULOCYTES # BLD AUTO: 0.07 THOUSAND/UL (ref 0–0.2)
IMM GRANULOCYTES NFR BLD AUTO: 1 % (ref 0–2)
LYMPHOCYTES # BLD AUTO: 1.31 THOUSANDS/ÂΜL (ref 0.6–4.47)
LYMPHOCYTES NFR BLD AUTO: 13 % (ref 14–44)
MAGNESIUM SERPL-MCNC: 1.4 MG/DL (ref 1.9–2.7)
MCH RBC QN AUTO: 31.1 PG (ref 26.8–34.3)
MCHC RBC AUTO-ENTMCNC: 32.8 G/DL (ref 31.4–37.4)
MCV RBC AUTO: 95 FL (ref 82–98)
MONOCYTES # BLD AUTO: 1.04 THOUSAND/ÂΜL (ref 0.17–1.22)
MONOCYTES NFR BLD AUTO: 10 % (ref 4–12)
MRSA NOSE QL CULT: NORMAL
NEUTROPHILS # BLD AUTO: 7.84 THOUSANDS/ÂΜL (ref 1.85–7.62)
NEUTS SEG NFR BLD AUTO: 75 % (ref 43–75)
NRBC BLD AUTO-RTO: 0 /100 WBCS
PLATELET # BLD AUTO: 170 THOUSANDS/UL (ref 149–390)
PMV BLD AUTO: 10.7 FL (ref 8.9–12.7)
POTASSIUM SERPL-SCNC: 4.2 MMOL/L (ref 3.5–5.3)
RBC # BLD AUTO: 3.02 MILLION/UL (ref 3.88–5.62)
SODIUM SERPL-SCNC: 131 MMOL/L (ref 135–147)
WBC # BLD AUTO: 10.38 THOUSAND/UL (ref 4.31–10.16)

## 2025-04-16 PROCEDURE — 99239 HOSP IP/OBS DSCHRG MGMT >30: CPT | Performed by: INTERNAL MEDICINE

## 2025-04-16 PROCEDURE — 97163 PT EVAL HIGH COMPLEX 45 MIN: CPT

## 2025-04-16 PROCEDURE — 83735 ASSAY OF MAGNESIUM: CPT

## 2025-04-16 PROCEDURE — 82948 REAGENT STRIP/BLOOD GLUCOSE: CPT

## 2025-04-16 PROCEDURE — 80048 BASIC METABOLIC PNL TOTAL CA: CPT

## 2025-04-16 PROCEDURE — 85025 COMPLETE CBC W/AUTO DIFF WBC: CPT

## 2025-04-16 RX ORDER — TAMSULOSIN HYDROCHLORIDE 0.4 MG/1
0.4 CAPSULE ORAL DAILY
Qty: 90 CAPSULE | Refills: 0 | Status: SHIPPED | OUTPATIENT
Start: 2025-04-16

## 2025-04-16 RX ORDER — ATORVASTATIN CALCIUM 40 MG/1
40 TABLET, FILM COATED ORAL DAILY
Qty: 90 TABLET | Refills: 3 | Status: SHIPPED | OUTPATIENT
Start: 2025-04-16 | End: 2025-04-16

## 2025-04-16 RX ORDER — FERROUS SULFATE 325(65) MG
325 TABLET ORAL
Qty: 60 TABLET | Refills: 0 | Status: SHIPPED | OUTPATIENT
Start: 2025-04-16

## 2025-04-16 RX ORDER — METOPROLOL TARTRATE 100 MG/1
150 TABLET ORAL EVERY 12 HOURS SCHEDULED
Qty: 90 TABLET | Refills: 0 | Status: SHIPPED | OUTPATIENT
Start: 2025-04-16

## 2025-04-16 RX ORDER — DILTIAZEM HYDROCHLORIDE 120 MG/1
120 CAPSULE, EXTENDED RELEASE ORAL EVERY 12 HOURS SCHEDULED
Qty: 60 CAPSULE | Refills: 1 | Status: SHIPPED | OUTPATIENT
Start: 2025-04-16

## 2025-04-16 RX ORDER — RANOLAZINE 500 MG/1
500 TABLET, EXTENDED RELEASE ORAL EVERY 12 HOURS
Qty: 60 TABLET | Refills: 0 | Status: SHIPPED | OUTPATIENT
Start: 2025-04-16

## 2025-04-16 RX ORDER — NICOTINE 21 MG/24HR
1 PATCH, TRANSDERMAL 24 HOURS TRANSDERMAL DAILY
Qty: 28 PATCH | Refills: 0 | Status: SHIPPED | OUTPATIENT
Start: 2025-04-16

## 2025-04-16 RX ORDER — LANOLIN ALCOHOL/MO/W.PET/CERES
400 CREAM (GRAM) TOPICAL ONCE
Status: DISCONTINUED | OUTPATIENT
Start: 2025-04-16 | End: 2025-04-16 | Stop reason: HOSPADM

## 2025-04-16 RX ORDER — PANTOPRAZOLE SODIUM 40 MG/1
40 TABLET, DELAYED RELEASE ORAL DAILY
Qty: 60 TABLET | Refills: 0 | Status: SHIPPED | OUTPATIENT
Start: 2025-04-16

## 2025-04-16 RX ORDER — LIDOCAINE 50 MG/G
1 PATCH TOPICAL DAILY
Qty: 30 PATCH | Refills: 0 | Status: SHIPPED | OUTPATIENT
Start: 2025-04-16

## 2025-04-16 RX ORDER — ASPIRIN 81 MG/1
81 TABLET ORAL DAILY
Qty: 90 TABLET | Refills: 0 | Status: SHIPPED | OUTPATIENT
Start: 2025-04-16

## 2025-04-16 RX ORDER — METHION/INOS/CHOL BT/B COM/LIV 110MG-86MG
100 CAPSULE ORAL DAILY
Qty: 90 TABLET | Refills: 0 | Status: SHIPPED | OUTPATIENT
Start: 2025-04-16

## 2025-04-16 RX ORDER — FLUTICASONE FUROATE AND VILANTEROL TRIFENATATE 200; 25 UG/1; UG/1
1 POWDER RESPIRATORY (INHALATION) DAILY
Qty: 60 EACH | Refills: 0 | Status: SHIPPED | OUTPATIENT
Start: 2025-04-16

## 2025-04-16 RX ORDER — AMOXICILLIN 250 MG
1 CAPSULE ORAL DAILY PRN
Qty: 30 TABLET | Refills: 0 | Status: SHIPPED | OUTPATIENT
Start: 2025-04-16

## 2025-04-16 RX ORDER — MAGNESIUM HYDROXIDE/ALUMINUM HYDROXICE/SIMETHICONE 120; 1200; 1200 MG/30ML; MG/30ML; MG/30ML
30 SUSPENSION ORAL EVERY 4 HOURS PRN
Qty: 769 ML | Refills: 0 | Status: SHIPPED | OUTPATIENT
Start: 2025-04-16

## 2025-04-16 RX ORDER — GABAPENTIN 300 MG/1
300 CAPSULE ORAL 3 TIMES DAILY
Qty: 90 CAPSULE | Refills: 0 | Status: SHIPPED | OUTPATIENT
Start: 2025-04-16

## 2025-04-16 RX ORDER — FOLIC ACID 1 MG/1
1000 TABLET ORAL DAILY
Qty: 90 TABLET | Refills: 0 | Status: SHIPPED | OUTPATIENT
Start: 2025-04-16

## 2025-04-16 RX ORDER — LANOLIN ALCOHOL/MO/W.PET/CERES
800 CREAM (GRAM) TOPICAL 2 TIMES DAILY
Qty: 120 TABLET | Refills: 0 | Status: SHIPPED | OUTPATIENT
Start: 2025-04-16

## 2025-04-16 RX ORDER — SACCHAROMYCES BOULARDII 250 MG
250 CAPSULE ORAL 2 TIMES DAILY
Qty: 30 CAPSULE | Refills: 0 | Status: SHIPPED | OUTPATIENT
Start: 2025-04-16

## 2025-04-16 RX ORDER — ALBUTEROL SULFATE 90 UG/1
2 INHALANT RESPIRATORY (INHALATION) EVERY 4 HOURS PRN
Qty: 18 G | Refills: 0 | Status: SHIPPED | OUTPATIENT
Start: 2025-04-16

## 2025-04-16 RX ORDER — ATORVASTATIN CALCIUM 40 MG/1
40 TABLET, FILM COATED ORAL DAILY
Qty: 90 TABLET | Refills: 0 | Status: SHIPPED | OUTPATIENT
Start: 2025-04-16

## 2025-04-16 RX ADMIN — Medication 250 MG: at 08:15

## 2025-04-16 RX ADMIN — METOPROLOL TARTRATE 150 MG: 100 TABLET, FILM COATED ORAL at 08:14

## 2025-04-16 RX ADMIN — FOLIC ACID 1000 MCG: 1 TABLET ORAL at 08:14

## 2025-04-16 RX ADMIN — PANTOPRAZOLE SODIUM 40 MG: 40 TABLET, DELAYED RELEASE ORAL at 06:05

## 2025-04-16 RX ADMIN — OXYCODONE HYDROCHLORIDE 5 MG: 5 TABLET ORAL at 05:19

## 2025-04-16 RX ADMIN — GABAPENTIN 300 MG: 300 CAPSULE ORAL at 08:15

## 2025-04-16 RX ADMIN — NICOTINE 1 PATCH: 21 PATCH, EXTENDED RELEASE TRANSDERMAL at 08:20

## 2025-04-16 RX ADMIN — SODIUM CHLORIDE, SODIUM LACTATE, POTASSIUM CHLORIDE, AND CALCIUM CHLORIDE 75 ML/HR: .6; .31; .03; .02 INJECTION, SOLUTION INTRAVENOUS at 06:05

## 2025-04-16 RX ADMIN — FERROUS SULFATE TAB 325 MG (65 MG ELEMENTAL FE) 325 MG: 325 (65 FE) TAB at 08:14

## 2025-04-16 RX ADMIN — FLUTICASONE FUROATE AND VILANTEROL TRIFENATATE 1 PUFF: 200; 25 POWDER RESPIRATORY (INHALATION) at 08:17

## 2025-04-16 RX ADMIN — DILTIAZEM HYDROCHLORIDE 120 MG: 60 CAPSULE, EXTENDED RELEASE ORAL at 08:17

## 2025-04-16 RX ADMIN — ACETAMINOPHEN 975 MG: 325 TABLET ORAL at 08:25

## 2025-04-16 RX ADMIN — ASPIRIN 81 MG: 81 TABLET, CHEWABLE ORAL at 08:14

## 2025-04-16 RX ADMIN — LIDOCAINE 1 PATCH: 700 PATCH TOPICAL at 08:16

## 2025-04-16 RX ADMIN — THIAMINE HCL TAB 100 MG 100 MG: 100 TAB at 08:14

## 2025-04-16 RX ADMIN — RANOLAZINE 500 MG: 500 TABLET, FILM COATED, EXTENDED RELEASE ORAL at 08:14

## 2025-04-16 RX ADMIN — ACETAMINOPHEN 975 MG: 325 TABLET ORAL at 01:07

## 2025-04-16 RX ADMIN — APIXABAN 5 MG: 5 TABLET, FILM COATED ORAL at 08:15

## 2025-04-16 NOTE — QUICK NOTE
Right buttock wound.   S/p bedside closure with 7 staples 04/14/2025   Wound is clean dry and intact.   Follow up in 10-14 days for staple removal.   Will follow in periphery.

## 2025-04-16 NOTE — ASSESSMENT & PLAN NOTE
Chronic hyponatremia likely secondary to alcohol use. Na on admission was 133.     Treated with IVF  Na back to BL prior to discharge   Follow up BMP outpatient

## 2025-04-16 NOTE — ASSESSMENT & PLAN NOTE
Patient was found to have hypomagnesemia on admission.   In the ED, received 2g IV Mg    Labs: Mg 0.9, K 3.5     Repleted as needed  Hx of chronic hypomagnesemia, resume home mag ox   Follow up Mag labs outpatient

## 2025-04-16 NOTE — PHYSICAL THERAPY NOTE
PT EVALUATION       04/16/25 1146   PT Last Visit   PT Visit Date 04/16/25   Note Type   Note type Evaluation   Pain Assessment   Pain Assessment Tool 0-10   Pain Score No Pain   Patient's Stated Pain Goal No pain   Multiple Pain Sites No   Restrictions/Precautions   Weight Bearing Precautions Per Order No   Other Precautions Fall Risk;Pain   Home Living   Type of Home Apartment   Home Layout One level;Performs ADLs on one level;Able to live on main level with bedroom/bathroom;Elevator   Bathroom Toilet Standard   Home Equipment Cane  (Rollator)   Prior Function   Level of [place] Independent with ADLs;Independent with functional mobility;Independent with IADLS   Lives With Alone   IADLs Family/Friend/Other provides transportation;Independent with meal prep;Independent with medication management  (Bus for transportation)   Falls in the last 6 months 1 to 4   General   Additional Pertinent History Pt is a 65 year-old male who was admitted to the hospital on 4/13/25 due to fall, alcohol intoxication.   Family/Caregiver Present No   Cognition   Overall Cognitive Status WFL   Arousal/Participation Cooperative   Orientation Level Oriented X4   Following Commands Follows multistep commands with increased time or repetition   Subjective   Subjective Pt agreeable to PT session this AM   RLE Assessment   RLE Assessment   (Hip/knee 4/5 ; Ankle 2-/5 (history of R foot drop))   LLE Assessment   LLE Assessment   (Grossly 4/5)   Bed Mobility   Supine to Sit 6  Modified independent   Additional items Bedrails   Sit to Supine 6  Modified independent   Additional items Bedrails   Transfers   Sit to Stand 5  Supervision   Stand to Sit 5  Supervision   Ambulation/Elevation   Gait pattern Foward flexed;Short stride;Step through pattern;Wide BIN   Gait Assistance 5  Supervision   Additional items Verbal cues   Assistive Device Rolling walker   Distance 100 feet with change of direction   Stair Management Assistance Not  tested  (pt reports not using the stairs at home)   Balance   Static Sitting Good   Dynamic Sitting Fair +   Static Standing Fair   Dynamic Standing Fair   Ambulatory Fair -  (RW)   Activity Tolerance   Activity Tolerance Patient tolerated treatment well;Patient limited by fatigue   Nurse Made Aware yes BHAKTI Swift   Assessment   Prognosis Good   Problem List Decreased strength;Decreased endurance;Impaired balance;Decreased mobility;Decreased safety awareness;Pain   Assessment Patient seen for Physical Therapy evaluation. Patient admitted with Fall.  Comorbidities affecting patient's physical performance include: Afib, CABG, CAD, cardiac disease, chronic pain, CHF, CKD, COPD, depression, diabetes, falls, GERD, hypertension, and hypothyroid.  Personal factors affecting patient at time of initial evaluation include: lives in 1 story house, ambulating with assistive device, inability to navigate level surfaces without external assistance, limited home support, positive fall history, and inability to perform IADLS . Prior to admission, patient was independent with functional mobility with rollator, independent with ADLS, living alone in 1 story home with 0 steps to enter, and ambulating household distance.  Please find objective findings from Physical Therapy assessment regarding body systems outlined above with impairments and limitations including weakness, impaired balance, decreased endurance, gait deviations, pain, decreased activity tolerance, decreased functional mobility tolerance, decreased safety awareness, fall risk, and decreased skin integrity.  The Barthel Index was used as a functional outcome tool presenting with a score of Barthel Index Score: 65 today indicating moderate limitations of functional mobility and ADLS.  Patient's clinical presentation is currently unstable/unpredictable as seen in patient's presentation of changing level of pain, increased fall risk, and decreased endurance. Pt would benefit  from continued Physical Therapy treatment to address deficits as defined above and maximize level of functional mobility. As demonstrated by objective findings, the assigned level of complexity for this evaluation is high.The patient's -[facility] Basic Mobility Inpatient Short Form Raw Score is 19. A Raw score of greater than 16 suggests the patient may benefit from discharge to home. Please also refer to the recommendation of the Physical Therapist for safe discharge planning.   Goals   Patient Goals to feel better   STG Expiration Date 04/23/25   Short Term Goal #1 Pt will perform bed mobility/transfers IND ; Standing balance will improve to fair+ to decrease risk of falls ; Pt will ambulate x 150 feet Mod I with RW ; AMPAC score will improve >20/24 to demonstrate improved functional independence   LTG Expiration Date 04/30/25   Long Term Goal #1 Standing balance will improve to good to decrease risk of falls ; Pt will ambulate x 500 feet Mod I with RW ; AMPAC score will improve >22/24 to demonstrate improved functional independence   Plan   Treatment/Interventions Functional transfer training;LE strengthening/ROM;ADL retraining;Therapeutic exercise;Endurance training;Gait training;Spoke to nursing   PT Frequency 3-5x/wk   Discharge Recommendation   Rehab Resource Intensity Level, PT III (Minimum Resource Intensity)   AM-PAC Basic Mobility Inpatient   Turning in Flat Bed Without Bedrails 4   Lying on Back to Sitting on Edge of Flat Bed Without Bedrails 4   Moving Bed to Chair 3   Standing Up From Chair Using Arms 3   Walk in Room 3   Climb 3-5 Stairs With Railing 2   Basic Mobility Inpatient Raw Score 19   Basic Mobility Standardized Score 42.48   [facility] Highest Level Of Mobility   -HLM Goal 6: Walk 10 steps or more   -HLM Achieved 7: Walk 25 feet or more   Barthel Index   Feeding 10   Bathing 0   Grooming Score 5   Dressing Score 5   Bladder Score 10   Bowels Score 10   Toilet Use Score 5   Transfers  (Bed/Chair) Score 10   Mobility (Level Surface) Score 10   Stairs Score 0   Barthel Index Score 65   End of Consult   Patient Position at End of Consult Supine;All needs within reach  (As found prior to session)   Licensure   [place] License Number  Ursula Omid [id number]

## 2025-04-16 NOTE — ASSESSMENT & PLAN NOTE
Chronic, stable, PMH of prostate CA (2020), s/p resection 2021. Home medication flomax 0.4 mg daily     Home Flomax initially held in setting of Afib w/ RVR   Resumed prior to discharge

## 2025-04-16 NOTE — DISCHARGE SUMMARY
Discharge Summary - Family Medicine   Name: Gregg Partida 65 y.o. male I MRN: [id number]  Unit/Bed#: 2 [address] Date of Admission: 4/13/2025   Date of Service: 4/16/2025 I Hospital Day: 2    Medical Problems       Resolved Problems  Date Reviewed: 12/9/2024          Resolved    Alcohol withdrawal (HCC) 4/16/2025     Resolved by  Rich Finn MD    Overview Signed 11/17/2022  2:01 PM by Donte Clark MD       11/17/22   Detoxed in hospital, but still does occasionally drink.          Electrolyte abnormality 4/16/2025     Resolved by  Rich Finn MD    SIRS (systemic inflammatory response syndrome) (HCC) 4/16/2025     Resolved by  Rich Finn MD    Afib with RVR 4/16/2025     Resolved by  Rich Finn MD    Overview Signed 11/17/2022  2:00 PM by Donte Clark MD       11/17/22  Notices occasional fast heartbeat which may be a manifestation of recurrent atrial fibrillation.  Not on any blood thinners         * (Principal) Fall 4/16/2025     Resolved by  Rich Finn MD    Open wound of buttock 4/16/2025     Resolved by  Rich Finn MD        Discharging Physician / Practitioner: Rich Finn MD  PCP: Lilliana Bliss MD  Admission Date:   Admission Orders (From admission, onward)       Ordered        04/14/25 1632  INPATIENT ADMISSION  Once            04/13/25 2014  Place in Observation  Once                          Discharge Date: 04/16/25    Consultations During Hospital Stay:  Surgery    Procedures Performed:   Bedside closure of R buttocks wound with stapes on 4/14/25    Significant Findings / Test Results:   4/16: WBC 10.38, BMP pending results   4/15: Na 132, K 3.8, BUN 21, Cr 1.13, Mg 1.4, WBC wnl, Hgb 9.2  4/14: , K 3.8, Ca 7.8, WBC wnl, Hgb 10.6  4/13: Na 133, Mg 0.9, , Hgb 11.2, Ethanol 182    Incidental Findings:   None     Test Results Pending at Discharge (will require follow up):   BMP     Outpatient Tests Requested:  Magnesium   BMP, CBC , Magnesium      Complications:  None    Reason for Admission: Fall     TCM questions:  - How much alcohol are you drinking?  - Are you taking your medications as prescribed?  - Did you completed your blood work?  - Do you have any fever or chills?    Hospital Course:   Gregg Partida is a 65 y.o. male patient who originally presented to the hospital on 4/13/2025 due to fall.     65-year-old male with history of alcohol abuse, CAD, atrial fibrillation, diabetes mellitus type 2, hypertension, COPD, GERD, hyponatremia admitted after sustaining a fall. He was intoxicated on admission and went into Afib with RVR while in the ED. Patient sustained rib fractures and a laceration of his Rt. Buttock prior to arriving. For the fall he was optimized with a pain regimen, for his rib fractures he was given his home inhalers, oxygen as needed, and incentive spirometry. For his Afib, he was managed with digoxin once, his home Metoprolol and Eliquis, and eventually his home Cardizem was added once his blood pressure could tolerate it. For his laceration, a wound culture was collected, wound care was given, and general surgery was consulted for a washout, debridement and lac repair. His wound culture grew enterococcus, and he was monitored off Abx since he was s/p washout and debridement. Patient was discharged with a referral to wound care and PT outpatient. All medications were verified and sent to [facility] pharmacy for home delivery with estimated time of delivery today between 2-4pm.     Please see above below of diagnoses and related plan for additional information.     * Fall  Assessment & Plan  Patient admits to fall yesterday after drinking. Patient unsure of how much he consumed and how he fell. States he hasn't had anything to eat in a few days.   CT chest/abd/pelv: Acute left 10th through 12th rib fractures, as described above. Bilateral healing rib fractures are otherwise stable since the prior exam.   CT head:  No interval  change. No acute intracranial hemorrhage or depressed calvarial fracture identified   CT cervical spine: No interval change. No acute fracture or evidence for traumatic malalignment.     ED Course: 1L IV D5NS, 100 mg Thiamine, 1 mg Folic acid, 2g IV mg, 1 L IV NS     Plan:   Refused multiple PT evaluation sessions.Not ideal for home PT due to home conditions per case management.   Referral placed for outpatient PT follow up   Treated with Tylenol 650 mg Q4 for mild pain, Merle 2.5 mg Q4 prn for moderate pain, and 5 mg prn for severe pain.   Discharged with Tylenol prn at home.   Follow up with wound care outpatient, s/p bedside closure with staples of R buttocks on 4/14/25     Open wound of buttock  Assessment & Plan  Open wound on right buttock, approximately 2 cm deep.   Irregular appearance, likely as a result of laceration from falling.  See media tab for pictures.  Tetanus UTD, last shot in 2020.    -Wound culture 4/14: 1+ polyps, no organisms seen    IV Ancef 2g Q8h (4/13-4/14)  Wound care referral placed outpatient   General Surgery consulted - S/p bedside closure of R buttocks wound on 4/14.   Follow up with surgery outpatient in 2 weeks for staples removal   Follow up with wound care   Slight increase in WBC, CBC ordered for outpatient follow up.    Electrolyte abnormality  Assessment & Plan  Patient was found to have hypomagnesemia on admission.   In the ED, received 2g IV Mg    Labs: Mg 0.9, K 3.5     Repleted as needed  Hx of chronic hypomagnesemia, resume home mag ox   Follow up Mag labs outpatient     Afib with RVR  Assessment & Plan  Patient presented to the ED after a fall and was found to be in Afib w/ RVR.    ED Course: 125 mcg IV Digoxin    Plan:   Monitored  on telemetry   Continue home Eliquis 5 mg BID  Continue home 150 mg Metoprolol Q12  Continue home 120 mg Cardizem Q12    Fracture of multiple ribs  Assessment & Plan  Patient had a fall 1 day ago, and sustained rib fractures. On PE, patient is  using accessory muscles to breathe, and has increased WOB.   Denies chest pain and SOB    CT chest/abd/pelv:  Acute left 10th through 12th rib fractures, as described above. Bilateral healing rib fractures are otherwise stable since the prior exam. No pneumothorax.     Plan:   Tylenol prn for pain   Incentive spirometry   Home inhalers  Lidocaine patch  Patient on Eliquis, high bleeding risk     Hyponatremia  Assessment & Plan  Chronic hyponatremia likely secondary to alcohol use. Na on admission was 133.     Treated with IVF  Na back to BL prior to discharge   Follow up BMP outpatient     Nicotine abuse  Assessment & Plan  Chronic stable smoked 1.5 packs x day    Nicotine 21 mg / 24 hour patch  Smoking cessation encouraged     CAD (coronary artery disease)  Assessment & Plan  Patient has a h/o CAD s/p CABG. Patient is home on Aspirin 81 mg EC tablet daily, Ranolazine 500 mg Q12, and Eliquis 5 mg BID.    Continue home Aspirin 81mg QD  Continue home Eliquis 5 mg BID  Continue home Ranolazine 500 mg BID  Start Lipitor 40 mg daily    History of prostate cancer  Assessment & Plan  Chronic, stable, PMH of prostate CA (2020), s/p resection 2021. Home medication flomax 0.4 mg daily     Home Flomax initially held in setting of Afib w/ RVR   Resumed prior to discharge    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 disease (HCC)  Assessment & Plan  Lab Results   Component Value Date    HGBA1C 5.4 02/15/2025       Recent Labs     04/15/25  2123 04/16/25  0007 04/16/25  0609 04/16/25  0714   POCGLU 149* 130 118 115         Blood Sugar Average: Last 72 hrs:  (P) 127.9375    Well controlled during hospital course with ISS    Continue home metformin 500 mg QD on discharge    COPD, severity to be determined (HCC)  Assessment & Plan  Chronic stable. Not in acute exacerbation. Currently satting 98% on 4L NC in setting of multiple rib fractures after a fall.   Home on Albuterol inhaler and Breo Ellipta inhaler.    Continue  "home inhalers  Rest of plan per rib fractures       Condition at Discharge: stable    Discharge Day Visit / Exam:   Subjective:  Patient was seen and examined at bedside. Laying comfortably in bed in not distress. Does not endorse any pain. Denies CP, SOB, abdominal pain, n/v, LE swelling. Last bowel movement was yesterday.   Vitals: Blood Pressure: 111/57 (04/16/25 0812)  Pulse: 58 (04/16/25 0812)  Temperature: 98.8 °F (37.1 °C) (04/16/25 0812)  Temp Source: Oral (04/15/25 4301)  Respirations: 18 (04/16/25 0422)  Height: 6' 2\" (188 cm) (04/13/25 2120)  Weight - Scale: 84.1 kg (185 lb 4.8 oz) (04/13/25 2120)  SpO2: (!) 89 % (04/16/25 0812)  Physical Exam  Vitals and nursing note reviewed.   Constitutional:       General: He is sleeping.   HENT:      Head: Normocephalic and atraumatic.      Nose: Nose normal.      Mouth/Throat:      Mouth: Mucous membranes are dry.      Pharynx: Oropharynx is clear.   Eyes:      Conjunctiva/sclera: Conjunctivae normal.   Cardiovascular:      Rate and Rhythm: Normal rate. Rhythm irregular.   Pulmonary:      Effort: Pulmonary effort is normal. No respiratory distress.      Breath sounds: Normal breath sounds.   Chest:      Chest wall: Tenderness (From rib fractures) present.   Abdominal:      General: Abdomen is flat. Bowel sounds are normal. There is no distension.      Palpations: Abdomen is soft.      Tenderness: There is no abdominal tenderness.   Musculoskeletal:         General: No signs of injury.      Cervical back: Neck supple.      Right lower leg: No edema.      Left lower leg: No edema.   Skin:     Comments: Right buttock wound dressing in place, no surrounding erythema or inflammation noted. Staples intact   Neurological:      Mental Status: He is oriented to person, place, and time. Mental status is at baseline.        Discussion with Family: Patient declined call to .     Discharge instructions/Information to patient and family:   See after visit summary " for information provided to patient and family.      Provisions for Follow-Up Care:  See after visit summary for information related to follow-up care and any pertinent home health orders.      Mobility at time of Discharge:   Basic Mobility Inpatient Raw Score: 14  JH-HLM Goal: 4: Move to chair/commode  JH-HLM Achieved: 6: Walk 10 steps or more  HLM Goal NOT achieved. Continue to encourage mobility in post discharge setting.     Disposition:   Home    Planned Readmission: No    Discharge Medications:  See after visit summary for reconciled discharge medications provided to patient and/or family.      Administrative Statements   Discharge Statement:  I have spent a total time of greater than 30 minutes in caring for this patient on the day of the visit/encounter. .    **Please Note: This note may have been constructed using a voice recognition system**

## 2025-04-16 NOTE — ASSESSMENT & PLAN NOTE
Patient has a h/o CAD s/p CABG. Patient is home on Aspirin 81 mg EC tablet daily, Ranolazine 500 mg Q12, and Eliquis 5 mg BID.    Continue home Aspirin 81mg QD  Continue home Eliquis 5 mg BID  Continue home Ranolazine 500 mg BID  Start Lipitor 40 mg daily

## 2025-04-16 NOTE — ASSESSMENT & PLAN NOTE
Patient had a fall 1 day ago, and sustained rib fractures. On PE, patient is using accessory muscles to breathe, and has increased WOB.   Denies chest pain and SOB    CT chest/abd/pelv:  Acute left 10th through 12th rib fractures, as described above. Bilateral healing rib fractures are otherwise stable since the prior exam. No pneumothorax.     Plan:   Tylenol prn for pain   Incentive spirometry   Home inhalers  Lidocaine patch  Patient on Eliquis, high bleeding risk

## 2025-04-16 NOTE — CASE MANAGEMENT
Case Management Discharge Planning Note    Patient name Gregg Partida  Location [address] [facility] [address] MRN [id number]  : 1960 Date 2025       Current Admission Date: 2025  Current Admission Diagnosis:Nicotine abuse   Patient Active Problem List    Diagnosis Date Noted Date Diagnosed    Elevated serum creatinine 2025     Hypomagnesemia 2025     Fracture of multiple ribs 2025     Atrial fibrillation with rapid ventricular response (HCC) 2025     Dyspnea on exertion 2025     Smoking 2024     Head injury 2024     Hypotension 2024     Mucus plugging of bronchi 2024     Acute respiratory failure with hypoxia (HCC) 2024     Hyponatremia 2024     Closed fracture of one rib of left side 2024     Ambulatory dysfunction 2024     Financial insecurity 2024     BPH (benign prostatic hyperplasia) 2024     Chronic alcohol use 2024     Hypertension 2024     Hemorrhagic cystitis 2024     Hypothyroidism 2024     Gastrointestinal hemorrhage with melena 10/29/2023     Chest pain 2023     Longstanding persistent atrial fibrillation (HCC) 2023     GERD (gastroesophageal reflux disease) 2023     Stable angina (HCC) 2022     Stage 3a chronic kidney disease (HCC) 2022     Fatty liver, alcoholic 04/15/2022     Nonischemic nontraumatic myocardial injury 04/10/2022     Mild protein-calorie malnutrition (HCC) 2021     Prostate cancer (HCC) 2021     Atrial fibrillation (HCC) 2020     Chronic heart failure with preserved ejection fraction (HCC) 2019     Noncompliance 2019     ETOH abuse 2019     Generalized weakness 10/17/2019     Cervical spinal stenosis 10/17/2019     History of prostate cancer 2019     CAD (coronary artery disease) 2019     Nicotine abuse 2019     Depression, recurrent (HCC) 10/10/2018     Hx of CABG  10/10/2018     Dyslipidemia 10/10/2018     COPD, severity to be determined (HCC) 10/09/2018     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 disease (HCC) 10/09/2018     Hypertensive heart and chronic kidney disease with heart failure and stage 1 through stage 4 chronic kidney disease, or chronic kidney disease (HCC) 10/09/2018       LOS (days): 2  Geometric Mean LOS (GMLOS) (days): 3  Days to GMLOS:1.1     OBJECTIVE:  Risk of Unplanned Readmission Score: 70.36     Current admission status: Inpatient   Preferred Pharmacy:   [facility] [address] [address]  Phone: [phone] Fax: [phone]    Primary Care Provider: Lilliana Bliss MD    Primary Insurance: [employer] REP  Secondary Insurance:     DISCHARGE DETAILS:    Other Referral/Resources/Interventions Provided:  Interventions: Transportation  Referral Comments: Notified by [place] physician that [facility] has medications and were planning to deliver to pt this afternoon between 2-4 PM.  [facility] requested transportation for early afternoon (12:45) to make sure pt was home for delivery.  [place] accepted ride for 2:30 .  SW contacted [facility] to discuss  time and medication delivery.  SW asked if medications could be delivered more on the later side.  Pharmacy representative said she would request meds be delivered after 3:15pm to give pt time to get home.    Treatment Team Recommendation: Short Term Rehab  Discharge Destination Plan:: Home  Transport at Discharge : Wheelchair van

## 2025-04-16 NOTE — HOSPITAL COURSE
65-year-old male with history of alcohol abuse, CAD, atrial fibrillation, diabetes mellitus type 2, hypertension, COPD, GERD, hyponatremia admitted after sustaining a fall. He was intoxicated on admission and went into Afib with RVR while in the ED. Patient sustained rib fractures and a laceration of his Rt. Buttock prior to arriving. For the fall he was optimized with a pain regimen, for his rib fractures he was given his home inhalers, oxygen as needed, and incentive spirometry. For his Afib, he was managed with digoxin once, his home Metoprolol and Eliquis, and eventually his home Cardizem was added once his blood pressure could tolerate it. For his laceration, a wound culture was collected, wound care was given, and general surgery was consulted for a washout, debridement and lac repair. His wound culture grew enterococcus, and he was monitored off Abx since he was s/p washout and debridement. Patient was discharged with a referral to wound care outpatient ***

## 2025-04-16 NOTE — PLAN OF CARE
Problem: PHYSICAL THERAPY ADULT  Goal: Performs mobility at highest level of function for planned discharge setting.  See evaluation for individualized goals.  Description: Treatment/Interventions: Functional transfer training, LE strengthening/ROM, ADL retraining, Therapeutic exercise, Endurance training, Gait training, Spoke to nursing          See flowsheet documentation for full assessment, interventions and recommendations.  Note: Prognosis: Good  Problem List: Decreased strength, Decreased endurance, Impaired balance, Decreased mobility, Decreased safety awareness, Pain  Assessment: Patient seen for Physical Therapy evaluation. Patient admitted with Fall.  Comorbidities affecting patient's physical performance include: Afib, CABG, CAD, cardiac disease, chronic pain, CHF, CKD, COPD, depression, diabetes, falls, GERD, hypertension, and hypothyroid.  Personal factors affecting patient at time of initial evaluation include: lives in 1 story house, ambulating with assistive device, inability to navigate level surfaces without external assistance, limited home support, positive fall history, and inability to perform IADLS . Prior to admission, patient was independent with functional mobility with rollator, independent with ADLS, living alone in 1 story home with 0 steps to enter, and ambulating household distance.  Please find objective findings from Physical Therapy assessment regarding body systems outlined above with impairments and limitations including weakness, impaired balance, decreased endurance, gait deviations, pain, decreased activity tolerance, decreased functional mobility tolerance, decreased safety awareness, fall risk, and decreased skin integrity.  The Barthel Index was used as a functional outcome tool presenting with a score of Barthel Index Score: 65 today indicating moderate limitations of functional mobility and ADLS.  Patient's clinical presentation is currently unstable/unpredictable as  seen in patient's presentation of changing level of pain, increased fall risk, and decreased endurance. Pt would benefit from continued Physical Therapy treatment to address deficits as defined above and maximize level of functional mobility. As demonstrated by objective findings, the assigned level of complexity for this evaluation is high.The patient's AM-PAC Basic Mobility Inpatient Short Form Raw Score is 19. A Raw score of greater than 16 suggests the patient may benefit from discharge to home. Please also refer to the recommendation of the Physical Therapist for safe discharge planning.        Rehab Resource Intensity Level, PT: III (Minimum Resource Intensity)    See flowsheet documentation for full assessment.

## 2025-04-16 NOTE — PLAN OF CARE
Problem: Potential for Falls  Goal: Patient will remain free of falls  Description: INTERVENTIONS:- Educate patient/family on patient safety including physical limitations- Instruct patient to call for assistance with activity - Consult OT/PT to assist with strengthening/mobility - Keep Call bell within reach- Keep bed low and locked with side rails adjusted as appropriate- Keep care items and personal belongings within reach- Initiate and maintain comfort rounds- Make Fall Risk Sign visible to staff- Offer Toileting every 2 Hours, in advance of need- Initiate/Maintain bed alarm- Obtain necessary fall risk management equipment: non skid socks- Apply yellow socks and bracelet for high fall risk patients- Consider moving patient to room near nurses station  INTERVENTIONS:- Educate patient/family on patient safety including physical limitations- Instruct patient to call for assistance with activity - Consult OT/PT to assist with strengthening/mobility - Keep Call bell within reach- Keep bed low and locked with side rails adjusted as appropriate- Keep care items and personal belongings within reach- Initiate and maintain comfort rounds- Make Fall Risk Sign visible to staff- Offer Toileting every 2 Hours, in advance of need- Initiate/Maintain bed alarm- Obtain necessary fall risk management equipment: non skid socks- Apply yellow socks and bracelet for high fall risk patients- Consider moving patient to room near nurses station  Outcome: Progressing     Problem: PAIN - ADULT  Goal: Verbalizes/displays adequate comfort level or baseline comfort level  Description: Interventions:- Encourage patient to monitor pain and request assistance- Assess pain using appropriate pain scale- Administer analgesics based on type and severity of pain and evaluate response- Implement non-pharmacological measures as appropriate and evaluate response- Consider cultural and social influences on pain and pain management- Notify  physician/advanced practitioner if interventions unsuccessful or patient reports new pain  Outcome: Progressing     Problem: INFECTION - ADULT  Goal: Absence or prevention of progression during hospitalization  Description: INTERVENTIONS:- Assess and monitor for signs and symptoms of infection- Monitor lab/diagnostic results- Monitor all insertion sites, i.e. indwelling lines, tubes, and drains- Monitor endotracheal if appropriate and nasal secretions for changes in amount and color- [place] appropriate cooling/warming therapies per order- Administer medications as ordered- Instruct and encourage patient and family to use good hand hygiene technique- Identify and instruct in appropriate isolation precautions for identified infection/condition  Outcome: Progressing  Goal: Absence of fever/infection during neutropenic period  Description: INTERVENTIONS:- Monitor WBC  Outcome: Progressing     Problem: SAFETY ADULT  Goal: Patient will remain free of falls  Description: INTERVENTIONS:- Educate patient/family on patient safety including physical limitations- Instruct patient to call for assistance with activity - Consult OT/PT to assist with strengthening/mobility - Keep Call bell within reach- Keep bed low and locked with side rails adjusted as appropriate- Keep care items and personal belongings within reach- Initiate and maintain comfort rounds- Make Fall Risk Sign visible to staff- Offer Toileting every 2 Hours, in advance of need- Initiate/Maintain bed alarm- Obtain necessary fall risk management equipment: non skid socks- Apply yellow socks and bracelet for high fall risk patients- Consider moving patient to room near nurses station  INTERVENTIONS:- Educate patient/family on patient safety including physical limitations- Instruct patient to call for assistance with activity - Consult OT/PT to assist with strengthening/mobility - Keep Call bell within reach- Keep bed low and locked with side rails adjusted as  appropriate- Keep care items and personal belongings within reach- Initiate and maintain comfort rounds- Make Fall Risk Sign visible to staff- Offer Toileting every 2 Hours, in advance of need- Initiate/Maintain bed alarm- Obtain necessary fall risk management equipment: non skid socks- Apply yellow socks and bracelet for high fall risk patients- Consider moving patient to room near nurses station  Outcome: Progressing  Goal: Maintain or return to baseline ADL function  Description: INTERVENTIONS:-  Assess patient's ability to carry out ADLs; assess patient's baseline for ADL function and identify physical deficits which impact ability to perform ADLs (bathing, care of mouth/teeth, toileting, grooming, dressing, etc.)- Assess/evaluate cause of self-care deficits - Assess range of motion- Assess patient's mobility; develop plan if impaired- Assess patient's need for assistive devices and provide as appropriate- Encourage maximum independence but intervene and supervise when necessary- Involve family in performance of ADLs- Assess for home care needs following discharge - Consider OT consult to assist with ADL evaluation and planning for discharge- Provide patient education as appropriate  Outcome: Progressing  Goal: Maintains/Returns to pre admission functional level  Description: INTERVENTIONS:- Perform AM-PAC 6 Click Basic Mobility/ Daily Activity assessment daily.- Set and communicate daily mobility goal to care team and patient/family/caregiver. - Collaborate with rehabilitation services on mobility goals if consulted- Perform Range of Motion 3 times a day.- Reposition patient every 2 hours.- Dangle patient 3 times a day- Stand patient 3 times a day- Ambulate patient 2 times a day- Out of bed to chair 3 times a day - Out of bed for meals 3 times a day- Out of bed for toileting- Record patient progress and toleration of activity level   Outcome: Progressing     Problem: DISCHARGE PLANNING  Goal: Discharge to home  or other facility with appropriate resources  Description: INTERVENTIONS:- Identify barriers to discharge w/patient and caregiver- Arrange for needed discharge resources and transportation as appropriate- Identify discharge learning needs (meds, wound care, etc.)- Arrange for interpretive services to assist at discharge as needed- Refer to Case Management Department for coordinating discharge planning if the patient needs post-hospital services based on physician/advanced practitioner order or complex needs related to functional status, cognitive ability, or social support system  Outcome: Progressing     Problem: Knowledge Deficit  Goal: Patient/family/caregiver demonstrates understanding of disease process, treatment plan, medications, and discharge instructions  Description: Complete learning assessment and assess knowledge base.Interventions:- Provide teaching at level of understanding- Provide teaching via preferred learning methods  Outcome: Progressing     Problem: CARDIOVASCULAR - ADULT  Goal: Maintains optimal cardiac output and hemodynamic stability  Description: INTERVENTIONS:- Monitor I/O, vital signs and rhythm- Monitor for S/S and trends of decreased cardiac output- Administer and titrate ordered vasoactive medications to optimize hemodynamic stability- Assess quality of pulses, skin color and temperature- Assess for signs of decreased coronary artery perfusion- Instruct patient to report change in severity of symptoms  Outcome: Progressing  Goal: Absence of cardiac dysrhythmias or at baseline rhythm  Description: INTERVENTIONS:- Continuous cardiac monitoring, vital signs, obtain 12 lead EKG if ordered- Administer antiarrhythmic and heart rate control medications as ordered- Monitor electrolytes and administer replacement therapy as ordered  Outcome: Progressing     Problem: RESPIRATORY - ADULT  Goal: Achieves optimal ventilation and oxygenation  Description: INTERVENTIONS:- Assess for changes in  respiratory status- Assess for changes in mentation and behavior- Position to facilitate oxygenation and minimize respiratory effort- Oxygen administered by appropriate delivery if ordered- Initiate smoking cessation education as indicated- Encourage broncho-pulmonary hygiene including cough, deep breathe, Incentive Spirometry- Assess the need for suctioning and aspirate as needed- Assess and instruct to report SOB or any respiratory difficulty- Respiratory Therapy support as indicated  Outcome: Progressing     Problem: METABOLIC, FLUID AND ELECTROLYTES - ADULT  Goal: Electrolytes maintained within normal limits  Description: INTERVENTIONS:- Monitor labs and assess patient for signs and symptoms of electrolyte imbalances- Administer electrolyte replacement as ordered- Monitor response to electrolyte replacements, including repeat lab results as appropriate- Instruct patient on fluid and nutrition as appropriate  Outcome: Progressing  Goal: Fluid balance maintained  Description: INTERVENTIONS:- Monitor labs - Monitor I/O and WT- Instruct patient on fluid and nutrition as appropriate- Assess for signs & symptoms of volume excess or deficit  Outcome: Progressing  Goal: Glucose maintained within target range  Description: INTERVENTIONS:- Monitor Blood Glucose as ordered- Assess for signs and symptoms of hyperglycemia and hypoglycemia- Administer ordered medications to maintain glucose within target range- Assess nutritional intake and initiate nutrition service referral as needed  Outcome: Progressing     Problem: SKIN/TISSUE INTEGRITY - ADULT  Goal: Skin Integrity remains intact(Skin Breakdown Prevention)  Description: Assess:-Perform Herrera assessment every shift-Clean and moisturize skin every shift-Inspect skin when repositioning, toileting, and assisting with ADLS-Assess under medical devices such as non skid socks every ahift-Assess extremities for adequate circulation and sensation Bed Management:-Have minimal  linens on bed & keep smooth, unwrinkled-Change linens as needed when moist or perspiring-Avoid sitting or lying in one position for more than 2 hours while in bed- Toileting:-Offer bedside commode-Assess for incontinence every 2-Use incontinent care products after each incontinent episode such as wipes Activity:-Mobilize patient 3 times a day-Encourage activity and walks on unit-Encourage or provide ROM exercises -Turn and reposition patient every 2 Hours-Use appropriate equipment to lift or move patient in bed-Instruct/ Assist with weight shifting every 60 min when out of bed in chair-Consider limitation of chair time 3 hour intervalsSkin Care:-Avoid use of baby powder, tape, friction and shearing, hot water or constrictive clothing-Relieve pressure over bony prominences using pillows, wedges-Do not massage red bony areasNext Steps:-Teach patient strategies to minimize risks such as mobility -Consider consults to  interdisciplinary teams such as OT/PT  Outcome: Progressing  Goal: Incision(s), wounds(s) or drain site(s) healing without S/S of infection  Description: INTERVENTIONS- Assess and document dressing, incision, wound bed, drain sites and surrounding tissue- Provide patient and family education- Perform skin care/dressing changes every shift  Outcome: Progressing  Goal: Pressure injury heals and does not worsen  Description: Interventions:- Implement low air loss mattress or specialty surface (Criteria met)- Apply silicone foam dressing- Instruct/assist with weight shifting every 60 minutes when in chair - Limit chair time to 3 hour intervals- Use special pressure reducing interventions such as waffle cushion when in chair - Apply fecal or urinary incontinence containment device - Perform passive or active ROM every 4hr- Turn and reposition patient & offload bony prominences every 2 hours - Utilize friction reducing device or surface for transfers - Consider consults to  interdisciplinary teams such as  OT/PT- Use incontinent care products after each incontinent episode such as wipes- Consider nutrition services referral as needed  Outcome: Progressing     Problem: MUSCULOSKELETAL - ADULT  Goal: Maintain or return mobility to safest level of function  Description: INTERVENTIONS:- Assess patient's ability to carry out ADLs; assess patient's baseline for ADL function and identify physical deficits which impact ability to perform ADLs (bathing, care of mouth/teeth, toileting, grooming, dressing, etc.)- Assess/evaluate cause of self-care deficits - Assess range of motion- Assess patient's mobility- Assess patient's need for assistive devices and provide as appropriate- Encourage maximum independence but intervene and supervise when necessary- Involve family in performance of ADLs- Assess for home care needs following discharge - Consider OT consult to assist with ADL evaluation and planning for discharge- Provide patient education as appropriate  Outcome: Progressing  Goal: Maintain proper alignment of affected body part  Description: INTERVENTIONS:- Support, maintain and protect limb and body alignment- Provide patient/ family with appropriate education  Outcome: Progressing

## 2025-04-16 NOTE — CASE MANAGEMENT
Case Management Discharge Planning Note    Patient name Gregg Partida  Location [address] 208/2 [facility] 208 MRN [id number]  : 1960 Date 2025       Current Admission Date: 2025  Current Admission Diagnosis:Nicotine abuse   Patient Active Problem List    Diagnosis Date Noted Date Diagnosed    Elevated serum creatinine 2025     Hypomagnesemia 2025     Fracture of multiple ribs 2025     Atrial fibrillation with rapid ventricular response (HCC) 2025     Dyspnea on exertion 2025     Smoking 2024     Head injury 2024     Hypotension 2024     Mucus plugging of bronchi 2024     Acute respiratory failure with hypoxia (HCC) 2024     Hyponatremia 2024     Closed fracture of one rib of left side 2024     Ambulatory dysfunction 2024     Financial insecurity 2024     BPH (benign prostatic hyperplasia) 2024     Chronic alcohol use 2024     Hypertension 2024     Hemorrhagic cystitis 2024     Hypothyroidism 2024     Gastrointestinal hemorrhage with melena 10/29/2023     Chest pain 2023     Longstanding persistent atrial fibrillation (HCC) 2023     GERD (gastroesophageal reflux disease) 2023     Stable angina (HCC) 2022     Stage 3a chronic kidney disease (HCC) 2022     Fatty liver, alcoholic 04/15/2022     Nonischemic nontraumatic myocardial injury 04/10/2022     Mild protein-calorie malnutrition (HCC) 2021     Prostate cancer (HCC) 2021     Atrial fibrillation (HCC) 2020     Chronic heart failure with preserved ejection fraction (HCC) 2019     Noncompliance 2019     ETOH abuse 2019     Generalized weakness 10/17/2019     Cervical spinal stenosis 10/17/2019     History of prostate cancer 2019     CAD (coronary artery disease) 2019     Nicotine abuse 2019     Depression, recurrent (HCC) 10/10/2018     Hx of CABG  10/10/2018     Dyslipidemia 10/10/2018     COPD, severity to be determined (HCC) 10/09/2018     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 disease (HCC) 10/09/2018     Hypertensive heart and chronic kidney disease with heart failure and stage 1 through stage 4 chronic kidney disease, or chronic kidney disease (HCC) 10/09/2018       LOS (days): 2  Geometric Mean LOS (GMLOS) (days): 3  Days to GMLOS:1.2     OBJECTIVE:  Risk of Unplanned Readmission Score: 70.77     Current admission status: Inpatient   Preferred Pharmacy:   [facility] [address] [address]  Phone: [phone] Fax: [phone]    Primary Care Provider: Lilliana Bliss MD    Primary Insurance: [employer] MC REP  Secondary Insurance:     DISCHARGE DETAILS:      Treatment Team Recommendation: Short Term Rehab  Discharge Destination Plan:: Home  Transport at Discharge : Wheelchair van     Number/Name of Dispatcher: Roundtrip  Transported by (Company and Unit #):  (pending)  ETA of Transport (Date): 04/16/25  ETA of Transport (Time): 1853 (requested)

## 2025-04-16 NOTE — ASSESSMENT & PLAN NOTE
Patient presented to the ED after a fall and was found to be in Afib w/ RVR.    ED Course: 125 mcg IV Digoxin    Plan:   Placed on telemetry   Continue home Eliquis 5 mg BID  Continue home 150 mg Metoprolol Q12  Continue home 120 mg Cardizem Q12
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
## 2025-04-16 NOTE — DISCHARGE INSTR - AVS FIRST PAGE
Follow up with wound care for your R buttocks wound   Follow up with surgery in 2 weeks.   Follow up with PCP at [facility]. Your appointment is scheduled for Friday, April 18th at 10:20 AM. The office has organized a  to come pick you up from your apartment at 9:45 AM. Please be ready and waiting outside for them at that time.

## 2025-04-16 NOTE — PLAN OF CARE
Problem: Potential for Falls  Goal: Patient will remain free of falls  Description: INTERVENTIONS:- Educate patient/family on patient safety including physical limitations- Instruct patient to call for assistance with activity - Consult OT/PT to assist with strengthening/mobility - Keep Call bell within reach- Keep bed low and locked with side rails adjusted as appropriate- Keep care items and personal belongings within reach- Initiate and maintain comfort rounds- Make Fall Risk Sign visible to staff- Offer Toileting every 2 Hours, in advance of need- Initiate/Maintain bed alarm- Obtain necessary fall risk management equipment: non skid socks- Apply yellow socks and bracelet for high fall risk patients- Consider moving patient to room near nurses station  INTERVENTIONS:- Educate patient/family on patient safety including physical limitations- Instruct patient to call for assistance with activity - Consult OT/PT to assist with strengthening/mobility - Keep Call bell within reach- Keep bed low and locked with side rails adjusted as appropriate- Keep care items and personal belongings within reach- Initiate and maintain comfort rounds- Make Fall Risk Sign visible to staff- Offer Toileting every 2 Hours, in advance of need- Initiate/Maintain bed alarm- Obtain necessary fall risk management equipment: non skid socks- Apply yellow socks and bracelet for high fall risk patients- Consider moving patient to room near nurses station  Outcome: Progressing     Problem: PAIN - ADULT  Goal: Verbalizes/displays adequate comfort level or baseline comfort level  Description: Interventions:- Encourage patient to monitor pain and request assistance- Assess pain using appropriate pain scale- Administer analgesics based on type and severity of pain and evaluate response- Implement non-pharmacological measures as appropriate and evaluate response- Consider cultural and social influences on pain and pain management- Notify  physician/advanced practitioner if interventions unsuccessful or patient reports new pain  Outcome: Progressing     Problem: INFECTION - ADULT  Goal: Absence or prevention of progression during hospitalization  Description: INTERVENTIONS:- Assess and monitor for signs and symptoms of infection- Monitor lab/diagnostic results- Monitor all insertion sites, i.e. indwelling lines, tubes, and drains- Monitor endotracheal if appropriate and nasal secretions for changes in amount and color- Williams appropriate cooling/warming therapies per order- Administer medications as ordered- Instruct and encourage patient and family to use good hand hygiene technique- Identify and instruct in appropriate isolation precautions for identified infection/condition  Outcome: Progressing  Goal: Absence of fever/infection during neutropenic period  Description: INTERVENTIONS:- Monitor WBC  Outcome: Progressing     Problem: SAFETY ADULT  Goal: Patient will remain free of falls  Description: INTERVENTIONS:- Educate patient/family on patient safety including physical limitations- Instruct patient to call for assistance with activity - Consult OT/PT to assist with strengthening/mobility - Keep Call bell within reach- Keep bed low and locked with side rails adjusted as appropriate- Keep care items and personal belongings within reach- Initiate and maintain comfort rounds- Make Fall Risk Sign visible to staff- Offer Toileting every 2 Hours, in advance of need- Initiate/Maintain bed alarm- Obtain necessary fall risk management equipment: non skid socks- Apply yellow socks and bracelet for high fall risk patients- Consider moving patient to room near nurses station  INTERVENTIONS:- Educate patient/family on patient safety including physical limitations- Instruct patient to call for assistance with activity - Consult OT/PT to assist with strengthening/mobility - Keep Call bell within reach- Keep bed low and locked with side rails adjusted as  appropriate- Keep care items and personal belongings within reach- Initiate and maintain comfort rounds- Make Fall Risk Sign visible to staff- Offer Toileting every 2 Hours, in advance of need- Initiate/Maintain bed alarm- Obtain necessary fall risk management equipment: non skid socks- Apply yellow socks and bracelet for high fall risk patients- Consider moving patient to room near nurses station  Outcome: Progressing  Goal: Maintain or return to baseline ADL function  Description: INTERVENTIONS:-  Assess patient's ability to carry out ADLs; assess patient's baseline for ADL function and identify physical deficits which impact ability to perform ADLs (bathing, care of mouth/teeth, toileting, grooming, dressing, etc.)- Assess/evaluate cause of self-care deficits - Assess range of motion- Assess patient's mobility; develop plan if impaired- Assess patient's need for assistive devices and provide as appropriate- Encourage maximum independence but intervene and supervise when necessary- Involve family in performance of ADLs- Assess for home care needs following discharge - Consider OT consult to assist with ADL evaluation and planning for discharge- Provide patient education as appropriate  Outcome: Progressing  Goal: Maintains/Returns to pre admission functional level  Description: INTERVENTIONS:- Perform AM-PAC 6 Click Basic Mobility/ Daily Activity assessment daily.- Set and communicate daily mobility goal to care team and patient/family/caregiver. - Collaborate with rehabilitation services on mobility goals if consulted- Perform Range of Motion 3 times a day.- Reposition patient every 2 hours.- Dangle patient 3 times a day- Stand patient 3 times a day- Ambulate patient 2 times a day- Out of bed to chair 3 times a day - Out of bed for meals 3 times a day- Out of bed for toileting- Record patient progress and toleration of activity level   Outcome: Progressing     Problem: DISCHARGE PLANNING  Goal: Discharge to home  or other facility with appropriate resources  Description: INTERVENTIONS:- Identify barriers to discharge w/patient and caregiver- Arrange for needed discharge resources and transportation as appropriate- Identify discharge learning needs (meds, wound care, etc.)- Arrange for interpretive services to assist at discharge as needed- Refer to Case Management Department for coordinating discharge planning if the patient needs post-hospital services based on physician/advanced practitioner order or complex needs related to functional status, cognitive ability, or social support system  Outcome: Progressing     Problem: Knowledge Deficit  Goal: Patient/family/caregiver demonstrates understanding of disease process, treatment plan, medications, and discharge instructions  Description: Complete learning assessment and assess knowledge base.Interventions:- Provide teaching at level of understanding- Provide teaching via preferred learning methods  Outcome: Progressing     Problem: CARDIOVASCULAR - ADULT  Goal: Maintains optimal cardiac output and hemodynamic stability  Description: INTERVENTIONS:- Monitor I/O, vital signs and rhythm- Monitor for S/S and trends of decreased cardiac output- Administer and titrate ordered vasoactive medications to optimize hemodynamic stability- Assess quality of pulses, skin color and temperature- Assess for signs of decreased coronary artery perfusion- Instruct patient to report change in severity of symptoms  Outcome: Progressing  Goal: Absence of cardiac dysrhythmias or at baseline rhythm  Description: INTERVENTIONS:- Continuous cardiac monitoring, vital signs, obtain 12 lead EKG if ordered- Administer antiarrhythmic and heart rate control medications as ordered- Monitor electrolytes and administer replacement therapy as ordered  Outcome: Progressing     Problem: RESPIRATORY - ADULT  Goal: Achieves optimal ventilation and oxygenation  Description: INTERVENTIONS:- Assess for changes in  respiratory status- Assess for changes in mentation and behavior- Position to facilitate oxygenation and minimize respiratory effort- Oxygen administered by appropriate delivery if ordered- Initiate smoking cessation education as indicated- Encourage broncho-pulmonary hygiene including cough, deep breathe, Incentive Spirometry- Assess the need for suctioning and aspirate as needed- Assess and instruct to report SOB or any respiratory difficulty- Respiratory Therapy support as indicated  Outcome: Progressing     Problem: METABOLIC, FLUID AND ELECTROLYTES - ADULT  Goal: Electrolytes maintained within normal limits  Description: INTERVENTIONS:- Monitor labs and assess patient for signs and symptoms of electrolyte imbalances- Administer electrolyte replacement as ordered- Monitor response to electrolyte replacements, including repeat lab results as appropriate- Instruct patient on fluid and nutrition as appropriate  Outcome: Progressing  Goal: Fluid balance maintained  Description: INTERVENTIONS:- Monitor labs - Monitor I/O and WT- Instruct patient on fluid and nutrition as appropriate- Assess for signs & symptoms of volume excess or deficit  Outcome: Progressing  Goal: Glucose maintained within target range  Description: INTERVENTIONS:- Monitor Blood Glucose as ordered- Assess for signs and symptoms of hyperglycemia and hypoglycemia- Administer ordered medications to maintain glucose within target range- Assess nutritional intake and initiate nutrition service referral as needed  Outcome: Progressing     Problem: SKIN/TISSUE INTEGRITY - ADULT  Goal: Skin Integrity remains intact(Skin Breakdown Prevention)  Description: Assess:-Perform Herrera assessment every shift-Clean and moisturize skin every shift-Inspect skin when repositioning, toileting, and assisting with ADLS-Assess under medical devices such as non skid socks every ahift-Assess extremities for adequate circulation and sensation Bed Management:-Have minimal  linens on bed & keep smooth, unwrinkled-Change linens as needed when moist or perspiring-Avoid sitting or lying in one position for more than 2 hours while in bed- Toileting:-Offer bedside commode-Assess for incontinence every 2-Use incontinent care products after each incontinent episode such as wipes Activity:-Mobilize patient 3 times a day-Encourage activity and walks on unit-Encourage or provide ROM exercises -Turn and reposition patient every 2 Hours-Use appropriate equipment to lift or move patient in bed-Instruct/ Assist with weight shifting every 60 min when out of bed in chair-Consider limitation of chair time 3 hour intervalsSkin Care:-Avoid use of baby powder, tape, friction and shearing, hot water or constrictive clothing-Relieve pressure over bony prominences using pillows, wedges-Do not massage red bony areasNext Steps:-Teach patient strategies to minimize risks such as mobility -Consider consults to  interdisciplinary teams such as OT/PT  Outcome: Progressing  Goal: Incision(s), wounds(s) or drain site(s) healing without S/S of infection  Description: INTERVENTIONS- Assess and document dressing, incision, wound bed, drain sites and surrounding tissue- Provide patient and family education- Perform skin care/dressing changes every shift  Outcome: Progressing  Goal: Pressure injury heals and does not worsen  Description: Interventions:- Implement low air loss mattress or specialty surface (Criteria met)- Apply silicone foam dressing- Instruct/assist with weight shifting every 60 minutes when in chair - Limit chair time to 3 hour intervals- Use special pressure reducing interventions such as waffle cushion when in chair - Apply fecal or urinary incontinence containment device - Perform passive or active ROM every 4hr- Turn and reposition patient & offload bony prominences every 2 hours - Utilize friction reducing device or surface for transfers - Consider consults to  interdisciplinary teams such as  OT/PT- Use incontinent care products after each incontinent episode such as wipes- Consider nutrition services referral as needed  Outcome: Progressing     Problem: MUSCULOSKELETAL - ADULT  Goal: Maintain or return mobility to safest level of function  Description: INTERVENTIONS:- Assess patient's ability to carry out ADLs; assess patient's baseline for ADL function and identify physical deficits which impact ability to perform ADLs (bathing, care of mouth/teeth, toileting, grooming, dressing, etc.)- Assess/evaluate cause of self-care deficits - Assess range of motion- Assess patient's mobility- Assess patient's need for assistive devices and provide as appropriate- Encourage maximum independence but intervene and supervise when necessary- Involve family in performance of ADLs- Assess for home care needs following discharge - Consider OT consult to assist with ADL evaluation and planning for discharge- Provide patient education as appropriate  Outcome: Progressing  Goal: Maintain proper alignment of affected body part  Description: INTERVENTIONS:- Support, maintain and protect limb and body alignment- Provide patient/ family with appropriate education  Outcome: Progressing

## 2025-04-16 NOTE — ASSESSMENT & PLAN NOTE
Patient admits to fall yesterday after drinking. Patient unsure of how much he consumed and how he fell. States he hasn't had anything to eat in a few days.   CT chest/abd/pelv: Acute left 10th through 12th rib fractures, as described above. Bilateral healing rib fractures are otherwise stable since the prior exam.   CT head:  No interval change. No acute intracranial hemorrhage or depressed calvarial fracture identified   CT cervical spine: No interval change. No acute fracture or evidence for traumatic malalignment.     ED Course: 1L IV D5NS, 100 mg Thiamine, 1 mg Folic acid, 2g IV mg, 1 L IV NS     Plan:   Treated with Tylenol 650 mg Q4 for mild pain, Merle 2.5 mg Q4 prn for moderate pain, and 5 mg prn for severe pain   Follow up with wound care outpatient, s/p bedside closure with staples of R buttocks on 4/14/25   Home inhalers continued  Incentive spirometry

## 2025-04-16 NOTE — ASSESSMENT & PLAN NOTE
Open wound on right buttock, approximately 2 cm deep.   Irregular appearance, likely as a result of laceration from falling.  See media tab for pictures.  Tetanus UTD, last shot in 2020.    -Wound culture 4/14: 1+ polyps, no organisms seen    IV Ancef 2g Q8h (4/13-4/14)  Wound care consulted, appreciate [facility]  General Surgery consulted - S/p bedside closure of R buttocks wound on 4/14.   Follow up with surgery outpatient in 2  weeks for staples removal   Follow up with wound care

## 2025-04-16 NOTE — PHYSICAL THERAPY NOTE
PT Cancellation Note       04/16/25 0910   Note Type   Note type Cancelled Session   Cancel Reasons Refusal   Additional Comments Attempted to see pt this AM for PT evaluation. Pt reports he will not get up now as he is sleeping. Requesting PT return later. Will follow-up as schedule allows.   Licensure   [place] License Number  Ursula Anne [id number]

## 2025-04-16 NOTE — ASSESSMENT & PLAN NOTE
Lab Results   Component Value Date    HGBA1C 5.4 02/15/2025       Recent Labs     04/15/25  2123 04/16/25  0007 04/16/25  0609 04/16/25  0714   POCGLU 149* 130 118 115         Blood Sugar Average: Last 72 hrs:  (P) 127.9375    Well controlled during hospital course with ISS    Continue home metformin 500 mg QD on discharge

## 2025-04-17 ENCOUNTER — RESULTS FOLLOW-UP (OUTPATIENT)
Age: 65
End: 2025-04-17

## 2025-04-17 LAB
BACTERIA WND AEROBE CULT: ABNORMAL
GRAM STN SPEC: ABNORMAL
GRAM STN SPEC: ABNORMAL

## 2025-04-19 LAB
BACTERIA BLD CULT: NORMAL
BACTERIA BLD CULT: NORMAL

## 2025-04-25 ENCOUNTER — TELEPHONE (OUTPATIENT)
Age: 65
End: 2025-04-25

## 2025-04-25 NOTE — TELEPHONE ENCOUNTER
Spoke with patient, patient did not want to schedule awv at this time, he stated he would stop in the office to schedule.

## 2025-04-27 ENCOUNTER — HOSPITAL ENCOUNTER (EMERGENCY)
Facility: HOSPITAL | Age: 65
Discharge: HOME | End: 2025-04-28
Admitting: EMERGENCY MEDICINE
Payer: COMMERCIAL

## 2025-04-27 ENCOUNTER — APPOINTMENT (EMERGENCY)
Dept: RADIOLOGY | Facility: HOSPITAL | Age: 65
End: 2025-04-27
Payer: COMMERCIAL

## 2025-04-27 VITALS
SYSTOLIC BLOOD PRESSURE: 124 MMHG | RESPIRATION RATE: 20 BRPM | DIASTOLIC BLOOD PRESSURE: 72 MMHG | OXYGEN SATURATION: 98 % | HEART RATE: 108 BPM | TEMPERATURE: 98.1 F

## 2025-04-27 DIAGNOSIS — F10.929 ALCOHOL INTOXICATION (HCC): Primary | ICD-10-CM
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
## 2025-04-27 DIAGNOSIS — E83.42 HYPOMAGNESEMIA: ICD-10-CM

## 2025-04-27 LAB
ALBUMIN SERPL BCG-MCNC: 3.5 G/DL (ref 3.5–5)
ALP SERPL-CCNC: 164 U/L (ref 34–104)
ALT SERPL W P-5'-P-CCNC: 24 U/L (ref 7–52)
ANION GAP SERPL CALCULATED.3IONS-SCNC: 19 MMOL/L (ref 4–13)
AST SERPL W P-5'-P-CCNC: 65 U/L (ref 13–39)
BASOPHILS # BLD AUTO: 0.13 THOUSANDS/ÂΜL (ref 0–0.1)
BASOPHILS NFR BLD AUTO: 1 % (ref 0–1)
BILIRUB SERPL-MCNC: 0.98 MG/DL (ref 0.2–1)
BUN SERPL-MCNC: 11 MG/DL (ref 5–25)
CALCIUM SERPL-MCNC: 7 MG/DL (ref 8.4–10.2)
CHLORIDE SERPL-SCNC: 98 MMOL/L (ref 96–108)
CK SERPL-CCNC: 352 U/L (ref 39–308)
CO2 SERPL-SCNC: 23 MMOL/L (ref 21–32)
CREAT SERPL-MCNC: 1.08 MG/DL (ref 0.6–1.3)
EOSINOPHIL # BLD AUTO: 0.03 THOUSAND/ÂΜL (ref 0–0.61)
EOSINOPHIL NFR BLD AUTO: 0 % (ref 0–6)
ERYTHROCYTE [DISTWIDTH] IN BLOOD BY AUTOMATED COUNT: 20 % (ref 11.6–15.1)
ETHANOL SERPL-MCNC: 448 MG/DL
GFR SERPL CREATININE-BSD FRML MDRD: 72 ML/MIN/1.73SQ M
GLUCOSE SERPL-MCNC: 76 MG/DL (ref 65–140)
HCT VFR BLD AUTO: 41.5 % (ref 36.5–49.3)
HGB BLD-MCNC: 13.7 G/DL (ref 12–17)
IMM GRANULOCYTES # BLD AUTO: 0.04 THOUSAND/UL (ref 0–0.2)
IMM GRANULOCYTES NFR BLD AUTO: 0 % (ref 0–2)
LYMPHOCYTES # BLD AUTO: 1.16 THOUSANDS/ÂΜL (ref 0.6–4.47)
LYMPHOCYTES NFR BLD AUTO: 10 % (ref 14–44)
MAGNESIUM SERPL-MCNC: 1 MG/DL (ref 1.9–2.7)
MCH RBC QN AUTO: 30.8 PG (ref 26.8–34.3)
MCHC RBC AUTO-ENTMCNC: 33 G/DL (ref 31.4–37.4)
MCV RBC AUTO: 93 FL (ref 82–98)
MONOCYTES # BLD AUTO: 0.64 THOUSAND/ÂΜL (ref 0.17–1.22)
MONOCYTES NFR BLD AUTO: 5 % (ref 4–12)
NEUTROPHILS # BLD AUTO: 10.17 THOUSANDS/ÂΜL (ref 1.85–7.62)
NEUTS SEG NFR BLD AUTO: 84 % (ref 43–75)
NRBC BLD AUTO-RTO: 0 /100 WBCS
PHOSPHATE SERPL-MCNC: 3.8 MG/DL (ref 2.3–4.1)
PLATELET # BLD AUTO: 138 THOUSANDS/UL (ref 149–390)
PMV BLD AUTO: 10.7 FL (ref 8.9–12.7)
POTASSIUM SERPL-SCNC: 3.3 MMOL/L (ref 3.5–5.3)
PROT SERPL-MCNC: 7.7 G/DL (ref 6.4–8.4)
RBC # BLD AUTO: 4.45 MILLION/UL (ref 3.88–5.62)
SODIUM SERPL-SCNC: 140 MMOL/L (ref 135–147)
WBC # BLD AUTO: 12.17 THOUSAND/UL (ref 4.31–10.16)

## 2025-04-27 PROCEDURE — 84100 ASSAY OF PHOSPHORUS: CPT

## 2025-04-27 PROCEDURE — 83735 ASSAY OF MAGNESIUM: CPT

## 2025-04-27 PROCEDURE — 96366 THER/PROPH/DIAG IV INF ADDON: CPT

## 2025-04-27 PROCEDURE — 36415 COLL VENOUS BLD VENIPUNCTURE: CPT

## 2025-04-27 PROCEDURE — 70450 CT HEAD/BRAIN W/O DYE: CPT

## 2025-04-27 PROCEDURE — 96365 THER/PROPH/DIAG IV INF INIT: CPT

## 2025-04-27 PROCEDURE — 82077 ASSAY SPEC XCP UR&BREATH IA: CPT

## 2025-04-27 PROCEDURE — 82550 ASSAY OF CK (CPK): CPT

## 2025-04-27 PROCEDURE — 85025 COMPLETE CBC W/AUTO DIFF WBC: CPT

## 2025-04-27 PROCEDURE — 96361 HYDRATE IV INFUSION ADD-ON: CPT

## 2025-04-27 PROCEDURE — 80053 COMPREHEN METABOLIC PANEL: CPT

## 2025-04-27 PROCEDURE — 72192 CT PELVIS W/O DYE: CPT

## 2025-04-27 PROCEDURE — 93005 ELECTROCARDIOGRAM TRACING: CPT

## 2025-04-27 PROCEDURE — 99284 EMERGENCY DEPT VISIT MOD MDM: CPT

## 2025-04-27 RX ORDER — MAGNESIUM SULFATE HEPTAHYDRATE 40 MG/ML
4 INJECTION, SOLUTION INTRAVENOUS ONCE
Status: COMPLETED | OUTPATIENT
Start: 2025-04-27 | End: 2025-04-27

## 2025-04-27 RX ORDER — LANOLIN ALCOHOL/MO/W.PET/CERES
400 CREAM (GRAM) TOPICAL ONCE
Status: COMPLETED | OUTPATIENT
Start: 2025-04-27 | End: 2025-04-27

## 2025-04-27 RX ADMIN — MAGNESIUM SULFATE HEPTAHYDRATE 4 G: 40 INJECTION, SOLUTION INTRAVENOUS at 14:12

## 2025-04-27 RX ADMIN — Medication 400 MG: at 15:49

## 2025-04-27 RX ADMIN — SODIUM CHLORIDE 1000 ML: 0.9 INJECTION, SOLUTION INTRAVENOUS at 13:23

## 2025-04-27 NOTE — ED NOTES
Pt removed his IV by accident, Mag IV infusing, almost completed. MD made aware. Pt ambulates to bathroom via walker slow gait.      Rhea Eason RN  04/27/25 [id number]

## 2025-04-27 NOTE — DISCHARGE INSTRUCTIONS
Take your prescribed medications including magnesium and vitamins.     Decrease your alcohol intake.    If you develop new or worsening symptoms, please return to the Emergency Department for further evaluation.

## 2025-04-27 NOTE — ED NOTES
"Pt arrived asking to eat \" I have not eaten in 2 days.\" Pt given a sandwich DR Zuñiga is aware.     Marisabel Birch, RN  04/27/25 1111    "

## 2025-04-27 NOTE — ED PROVIDER NOTES
Time reflects when diagnosis was documented in both MDM as applicable and the Disposition within this note       Time User Action Codes Description Comment    4/27/2025  6:36 PM Thang Zuñiga [F10.929] Alcohol intoxication (HCC)     4/27/2025  6:36 PM Thang Zuñiga [W19.XXXA] Fall, initial encounter     4/27/2025  6:36 PM Thang Zuñiga [E83.42] Hypomagnesemia           ED Disposition       None          Assessment & Plan       Medical Decision Making  Amount and/or Complexity of Data Reviewed  Labs: ordered. Decision-making details documented in ED Course.  Radiology: ordered.    Risk  OTC drugs.  Prescription drug management.      66 y/o M presents for evaluation of fall. Pt appears intoxicated. Pt reports falling off the toilet at home. States he called for EMS because he felt weak. Denies HS or LOC. Reports sacral area pain from falling. Hx limited 2/2 intoxication.   Tachycardic but HDS on arrival  Intoxicated, incontinent to stool. GCS14. No focal traumatic findings.  Trauma eval negative (CT head and pelvis)  Labs notable for ethanol 448, mag 1.0. IV and PO mag given to pt  D/w medicine for admit for hypomagnesemia and markedly elevated etoh, admission was not felt necessary by admitting team.   Pt to be monitored for sobriety prior to discharge.       ED Course as of 04/27/25 1837   Sun Apr 27, 2025   1335 Pulse(!): 113  SIRS not felt 2/2 infection, likely related to etoh use, mild dehydration    1350 ETHANOL(!): 448   1406 Creatinine: 1.08   1546 Family medicine refusing admit   1753 Assess sobriety around 9-10pm       Medications   sodium chloride 0.9 % bolus 1,000 mL (0 mL Intravenous Stopped 4/27/25 1743)   magnesium sulfate 4 g/100 mL IVPB (premix) 4 g (0 g Intravenous Stopped 4/27/25 1743)   magnesium Oxide (MAG-OX) tablet 400 mg (400 mg Oral Given 4/27/25 1549)       ED Risk Strat Scores                    No data recorded                            History of Present Illness        Chief Complaint   Patient presents with    Fall     Pt fell out of toilet pt sts he did not hit head. Pt c/o lower back pain. Pt on asa and eliquis       Past Medical History:   Diagnosis Date    Acute on chronic kidney failure  (HCC)     Alcohol abuse     Alcohol withdrawal (HCC) 06/07/2019    Atrial fibrillation (HCC)     Cancer (HCC)     prostate ca,had radiation    Cardiac disease     stents,then triple bypass    COPD (chronic obstructive pulmonary disease) ([facility])     Coronary artery disease     ETOH abuse     Heart failure ([facility])     History of heart surgery     says triple bypass [facility]    Hx of heart artery stent     2014    Hyperlipidemia     Hypertension     Hyponatremia 10/17/2019    Hypovolemic shock ([facility]) 12/22/2019    Lumbar spondylitis ([facility]) 10/13/2022    Metabolic acidosis, increased anion gap 04/21/2021    Nasal bone fracture 10/10/2022    Prostate CA ([facility])     S/P CABG x 3     2004    Sleep apnea       Past Surgical History:   Procedure Laterality Date    CARDIAC CATHETERIZATION      2 stents    CORONARY ARTERY BYPASS GRAFT      CORONARY ARTERY BYPASS GRAFT  2004    WA ARTHRD ANT INTERBODY MIN DSC CRV BELOW C2 N/A 12/16/2020    Procedure: Anterior cervical discectomy with fusion C4-C7; Posterior cervical decompression and fusion C2-T2;  Surgeon: David Rowell MD;  Location: [facility];  Service: Neurosurgery    TONSILLECTOMY        Family History   Problem Relation Age of Onset    Diabetes Mother     Uterine cancer Mother     COPD Father     Hypertension Father       Social History     Tobacco Use    Smoking status: Every Day     Current packs/day: 1.50     Average packs/day: 1.5 packs/day for 40.0 years (60.0 ttl pk-yrs)     Types: Cigarettes    Smokeless tobacco: Never   Vaping Use    Vaping status: Never Used   Substance Use Topics    Alcohol use: Yes     Alcohol/week: 4.0 standard drinks of alcohol     Types: 4 Standard drinks or equivalent per week     Comment: quart of vodka daily     Drug use: No      E-Cigarette/Vaping    E-Cigarette Use Never User       E-Cigarette/Vaping Substances    Nicotine Yes     THC No     CBD No     Flavoring No     Other No     Unknown No       I have reviewed and agree with the history as documented.     HPI  See mdm  Review of Systems   Unable to perform ROS: Mental status change (intoxication)           Objective       ED Triage Vitals   Temperature Pulse Blood Pressure Respirations SpO2 Patient Position - Orthostatic VS   04/27/25 1229 04/27/25 1229 04/27/25 1229 04/27/25 1229 04/27/25 1229 --   98.1 °F (36.7 °C) (!) 113 160/70 20 98 %       Temp Source Heart Rate Source BP Location FiO2 (%) Pain Score    04/27/25 1229 04/27/25 1542 -- -- --    Tympanic Monitor         Vitals      Date and Time Temp Pulse SpO2 Resp BP Pain Score FACES Pain Rating User   04/27/25 1542 -- 108 98 % 20 124/72 -- -- MARICRUZ   04/27/25 1229 98.1 °F (36.7 °C) 113 98 % 20 160/70 -- -- MARICRUZ            Physical Exam  Vitals and nursing note reviewed.   Constitutional:       General: He is not in acute distress.     Appearance: He is well-developed. He is not toxic-appearing.   HENT:      Head: Normocephalic and atraumatic.      Right Ear: External ear normal.      Left Ear: External ear normal.      Nose: Nose normal.      Mouth/Throat:      Pharynx: Oropharynx is clear.   Eyes:      Extraocular Movements: Extraocular movements intact.      Pupils: Pupils are equal, round, and reactive to light.   Cardiovascular:      Rate and Rhythm: Regular rhythm. Tachycardia present.      Pulses: Normal pulses.      Heart sounds: Normal heart sounds. No murmur heard.     No friction rub. No gallop.   Pulmonary:      Effort: Pulmonary effort is normal. No respiratory distress.      Breath sounds: Normal breath sounds. No wheezing, rhonchi or rales.   Abdominal:      General: Abdomen is flat.      Palpations: Abdomen is soft.      Tenderness: There is no abdominal tenderness. There is no guarding or rebound.    Musculoskeletal:         General: Normal range of motion.      Cervical back: Normal range of motion. No rigidity.      Right lower leg: No edema.      Left lower leg: No edema.   Skin:     General: Skin is warm and dry.      Capillary Refill: Capillary refill takes less than 2 seconds.   Neurological:      Mental Status: He is alert. He is disoriented.      Comments: Gcs 14         Results Reviewed       Procedure Component Value Units Date/Time    Magnesium [[id number]]  (Abnormal) Collected: 04/27/25 1323    Lab Status: Final result Specimen: Blood from Arm, Right Updated: 04/27/25 1407     Magnesium 1.0 mg/dL     Comprehensive metabolic panel [[id number]]  (Abnormal) Collected: 04/27/25 1323    Lab Status: Final result Specimen: Blood from Arm, Right Updated: 04/27/25 1403     Sodium 140 mmol/L      Potassium 3.3 mmol/L      Chloride 98 mmol/L      CO2 23 mmol/L      ANION GAP 19 mmol/L      BUN 11 mg/dL      Creatinine 1.08 mg/dL      Glucose 76 mg/dL      Calcium 7.0 mg/dL      AST 65 U/L      ALT 24 U/L      Alkaline Phosphatase 164 U/L      Total Protein 7.7 g/dL      Albumin 3.5 g/dL      Total Bilirubin 0.98 mg/dL      eGFR 72 ml/min/1.73sq m     Narrative:      National Kid[employer] Disease Foundation guidelines for Chronic Kidney Disease (CKD):     Stage 1 with normal or high GFR (GFR > 90 mL/min/1.73 square meters)    Stage 2 Mild CKD (GFR = 60-89 mL/min/1.73 square meters)    Stage 3A Moderate CKD (GFR = 45-59 mL/min/1.73 square meters)    Stage 3B Moderate CKD (GFR = 30-44 mL/min/1.73 square meters)    Stage 4 Severe CKD (GFR = 15-29 mL/min/1.73 square meters)    Stage 5 End Stage CKD (GFR <15 mL/min/1.73 square meters)  Note: GFR calculation is accurate only with a steady state creatinine    Phosphorus [[id number]]  (Normal) Collected: 04/27/25 1323    Lab Status: Final result Specimen: Blood from Arm, Right Updated: 04/27/25 1403     Phosphorus 3.8 mg/dL     CK [[id number]]  (Abnormal) Collected: 04/27/25  1323    Lab Status: Final result Specimen: Blood from Arm, Right Updated: 04/27/25 1403     Total  U/L     Ethanol [[id number]]  (Abnormal) Collected: 04/27/25 1323    Lab Status: Final result Specimen: Blood from Arm, Right Updated: 04/27/25 1347     Ethanol Lvl 448 mg/dL     CBC and differential [[id number]]  (Abnormal) Collected: 04/27/25 1323    Lab Status: Final result Specimen: Blood from Arm, Right Updated: 04/27/25 1330     WBC 12.17 Thousand/uL      RBC 4.45 Million/uL      Hemoglobin 13.7 g/dL      Hematocrit 41.5 %      MCV 93 fL      MCH 30.8 pg      MCHC 33.0 g/dL      RDW 20.0 %      MPV 10.7 fL      Platelets 138 Thousands/uL      nRBC 0 /100 WBCs      Segmented % 84 %      Immature Grans % 0 %      Lymphocytes % 10 %      Monocytes % 5 %      Eosinophils Relative 0 %      Basophils Relative 1 %      Absolute Neutrophils 10.17 Thousands/µL      Absolute Immature Grans 0.04 Thousand/uL      Absolute Lymphocytes 1.16 Thousands/µL      Absolute Monocytes 0.64 Thousand/µL      Eosinophils Absolute 0.03 Thousand/µL      Basophils Absolute 0.13 Thousands/µL             CT pelvis wo contrast   Final Interpretation by Gokul Lan MD (04/27 1500)      No acute pelvic injury.      Workstation performed: [id number]         CT head without contrast   Final Interpretation by Ventura Gonzalez DO (04/27 1501)      No acute intracranial abnormality.  Chronic microangiopathic changes.                  Workstation performed: [id number]             Procedures    ED Medication and Procedure Management   Prior to Admission Medications   Prescriptions Last Dose Informant Patient Reported? Taking?   Blood Glucose Monitoring Suppl (ONE TOUCH ULTRA MINI) w/Device KIT  Self Yes No   Sig: Use as directed   Blood Pressure Monitoring (Adult Blood Pressure Cuff Lg) KIT   No No   Sig: Use in the morning   Breo Ellipta 200-25 MCG/ACT inhaler   No No   Sig: Inhale 1 puff daily Rinse mouth after use.   ONETOUCH DELICA  LANCETS FINE MISC  Self Yes No   Sig: 3 (three) times a day Test   Thiamine HCl (vitamin B-1) 100 MG TABS   No No   Sig: Take 1 tablet (100 mg total) by mouth daily   albuterol (Ventolin HFA) 90 mcg/act inhaler   No No   Sig: Inhale 2 puffs every 4 (four) hours as needed for wheezing   aluminum-magnesium hydroxide-simethicone (MAALOX) 5773-2621-858 mg/30 mL suspension   No No   Sig: Take 30 mL by mouth every 4 (four) hours as needed for indigestion or heartburn   apixaban (Eliquis) 5 mg   No No   Sig: Take 1 tablet (5 mg total) by mouth 2 (two) times a day   aspirin 81 mg EC tablet   No No   Sig: Take 1 tablet (81 mg total) by mouth daily   atorvastatin (LIPITOR) 40 mg tablet   No No   Sig: Take 1 tablet (40 mg total) by mouth daily   diltiazem (CARDIZEM SR) 120 mg 12 hr capsule   No No   Sig: Take 1 capsule (120 mg total) by mouth every 12 (twelve) hours   ferrous sulfate 325 (65 Fe) mg tablet   No No   Sig: Take 1 tablet (325 mg total) by mouth daily with breakfast   folic acid (FOLVITE) 1 mg tablet   No No   Sig: Take 1 tablet (1,000 mcg total) by mouth daily   gabapentin (NEURONTIN) 300 mg capsule   No No   Sig: Take 1 capsule (300 mg total) by mouth 3 (three) times a day   lidocaine (Lidoderm) 5 %   No No   Sig: Apply 1 patch topically over 12 hours daily Remove & Discard patch within 12 hours or as directed by MD   magnesium Oxide (MAG-OX) 400 mg TABS   No No   Sig: Take 2 tablets (800 mg total) by mouth 2 (two) times a day   metFORMIN (GLUCOPHAGE) 500 mg tablet   No No   Sig: Take 1 tablet (500 mg total) by mouth daily with breakfast   metoprolol tartrate (LOPRESSOR) 100 mg tablet   No No   Sig: Take 1.5 tablets (150 mg total) by mouth every 12 (twelve) hours   naltrexone (REVIA) 50 mg tablet   Yes No   Sig: Take 50 mg by mouth daily   nicotine (NICODERM CQ) 21 mg/24 hr TD 24 hr patch   No No   Sig: Place 1 patch on the skin over 24 hours daily   pantoprazole (PROTONIX) 40 mg tablet   No No   Sig: Take 1  tablet (40 mg total) by mouth daily   ranolazine (RANEXA) 500 mg 12 hr tablet   No No   Sig: Take 1 tablet (500 mg total) by mouth every 12 (twelve) hours   saccharomyces boulardii (FLORASTOR) 250 mg capsule   No No   Sig: Take 1 capsule (250 mg total) by mouth 2 (two) times a day   senna-docusate sodium (SENOKOT S) 8.6-50 mg per tablet   No No   Sig: Take 1 tablet by mouth daily as needed for constipation   tamsulosin (FLOMAX) 0.4 mg   No No   Sig: Take 1 capsule (0.4 mg total) by mouth daily      Facility-Administered Medications: None     Patient's Medications   Discharge Prescriptions    No medications on file     No discharge procedures on file.  ED SEPSIS DOCUMENTATION   Time reflects when diagnosis was documented in both MDM as applicable and the Disposition within this note       Time User Action Codes Description Comment    4/27/2025  6:36 PM Thang Zuñiga [F10.929] Alcohol intoxication (HCC)     4/27/2025  6:36 PM Thang Zuñiga [W19.XXXA] Fall, initial encounter     4/27/2025  6:36 PM Thang Zñuiga [E83.42] Hypomagnesemia                  Thang Zuñiga MD  04/27/25 6288

## 2025-04-27 NOTE — ED NOTES
Patient incontinent of stool. Soiled clothing removed and patient cleaned by 2 RN's. New sheet and chux pad placed.      Maira Gonzalez RN  04/27/25 1949

## 2025-04-28 ENCOUNTER — TELEPHONE (OUTPATIENT)
Age: 65
End: 2025-04-28

## 2025-04-28 LAB
QRS AXIS: 90 DEGREES
QRSD INTERVAL: 92 MS
QT INTERVAL: 346 MS
QTC INTERVAL: 491 MS
T WAVE AXIS: 34 DEGREES
VENTRICULAR RATE: 121 BPM

## 2025-04-28 PROCEDURE — 93010 ELECTROCARDIOGRAM REPORT: CPT | Performed by: INTERNAL MEDICINE

## 2025-04-28 NOTE — ED NOTES
Wheelchair van transport for patient to go home at this time. [employer] claimed ride. Estimated eta 1030.      Jonel Ryder RN  04/28/25 5690

## 2025-04-30 ENCOUNTER — TELEPHONE (OUTPATIENT)
Age: 65
End: 2025-04-30

## 2025-04-30 ENCOUNTER — APPOINTMENT (EMERGENCY)
Dept: CT IMAGING | Facility: HOSPITAL | Age: 65
DRG: 178 | End: 2025-04-30
Payer: COMMERCIAL

## 2025-04-30 ENCOUNTER — APPOINTMENT (EMERGENCY)
Dept: RADIOLOGY | Facility: HOSPITAL | Age: 65
DRG: 178 | End: 2025-04-30
Payer: COMMERCIAL

## 2025-04-30 ENCOUNTER — HOSPITAL ENCOUNTER (INPATIENT)
Facility: HOSPITAL | Age: 65
LOS: 1 days | Discharge: HOME | DRG: 178 | End: 2025-05-01
Attending: SURGERY | Admitting: STUDENT IN AN ORGANIZED HEALTH CARE EDUCATION/TRAINING PROGRAM
Payer: COMMERCIAL

## 2025-04-30 DIAGNOSIS — J44.9 COPD (CHRONIC OBSTRUCTIVE PULMONARY DISEASE) (HCC): ICD-10-CM
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
## 2025-04-30 DIAGNOSIS — F10.90 ALCOHOL USE DISORDER: ICD-10-CM

## 2025-04-30 DIAGNOSIS — J69.0 ASPIRATION PNEUMONIA (HCC): ICD-10-CM

## 2025-04-30 PROBLEM — N18.2 CKD (CHRONIC KIDNEY DISEASE) STAGE 2, GFR 60-89 ML/MIN: Status: ACTIVE | Noted: 2025-04-30

## 2025-04-30 PROBLEM — I25.10 CAD (CORONARY ARTERY DISEASE): Status: ACTIVE | Noted: 2025-04-30

## 2025-04-30 PROBLEM — S31.819A WOUND OF RIGHT BUTTOCK: Status: ACTIVE | Noted: 2025-04-30

## 2025-04-30 PROBLEM — Z72.0 TOBACCO ABUSE: Status: ACTIVE | Noted: 2025-04-30

## 2025-04-30 PROBLEM — I48.91 A-FIB (HCC): Status: ACTIVE | Noted: 2025-04-30

## 2025-04-30 PROBLEM — I50.30 HEART FAILURE WITH PRESERVED EJECTION FRACTION (HCC): Status: ACTIVE | Noted: 2025-04-30

## 2025-04-30 PROBLEM — E11.9 TYPE 2 DIABETES MELLITUS, WITHOUT LONG-TERM CURRENT USE OF INSULIN (HCC): Status: ACTIVE | Noted: 2025-04-30

## 2025-04-30 PROBLEM — R06.02 SOB (SHORTNESS OF BREATH): Status: ACTIVE | Noted: 2025-04-30

## 2025-04-30 LAB
ALBUMIN SERPL BCG-MCNC: 3.5 G/DL (ref 3.5–5)
ALBUMIN SERPL BCG-MCNC: 3.5 G/DL (ref 3.5–5)
ALP SERPL-CCNC: 170 U/L (ref 34–104)
ALP SERPL-CCNC: 170 U/L (ref 34–104)
ALT SERPL W P-5'-P-CCNC: 26 U/L (ref 7–52)
ALT SERPL W P-5'-P-CCNC: 26 U/L (ref 7–52)
ANION GAP SERPL CALCULATED.3IONS-SCNC: 14 MMOL/L (ref 4–13)
ANION GAP SERPL CALCULATED.3IONS-SCNC: 14 MMOL/L (ref 4–13)
ANISOCYTOSIS BLD QL SMEAR: PRESENT
ANISOCYTOSIS BLD QL SMEAR: PRESENT
AST SERPL W P-5'-P-CCNC: 78 U/L (ref 13–39)
AST SERPL W P-5'-P-CCNC: 78 U/L (ref 13–39)
BASE EXCESS BLDA CALC-SCNC: 2 MMOL/L (ref -2–3)
BASE EXCESS BLDA CALC-SCNC: 2 MMOL/L (ref -2–3)
BASOPHILS # BLD MANUAL: 0 THOUSAND/UL (ref 0–0.1)
BASOPHILS # BLD MANUAL: 0 THOUSAND/UL (ref 0–0.1)
BASOPHILS NFR MAR MANUAL: 0 % (ref 0–1)
BASOPHILS NFR MAR MANUAL: 0 % (ref 0–1)
BILIRUB SERPL-MCNC: 0.73 MG/DL (ref 0.2–1)
BILIRUB SERPL-MCNC: 0.73 MG/DL (ref 0.2–1)
BUN SERPL-MCNC: 10 MG/DL (ref 5–25)
BUN SERPL-MCNC: 10 MG/DL (ref 5–25)
CA-I BLD-SCNC: 0.87 MMOL/L (ref 1.12–1.32)
CA-I BLD-SCNC: 0.87 MMOL/L (ref 1.12–1.32)
CALCIUM SERPL-MCNC: 7.2 MG/DL (ref 8.4–10.2)
CALCIUM SERPL-MCNC: 7.2 MG/DL (ref 8.4–10.2)
CHLORIDE SERPL-SCNC: 98 MMOL/L (ref 96–108)
CHLORIDE SERPL-SCNC: 98 MMOL/L (ref 96–108)
CK SERPL-CCNC: 172 U/L (ref 39–308)
CK SERPL-CCNC: 172 U/L (ref 39–308)
CO2 SERPL-SCNC: 27 MMOL/L (ref 21–32)
CO2 SERPL-SCNC: 27 MMOL/L (ref 21–32)
CREAT SERPL-MCNC: 1.12 MG/DL (ref 0.6–1.3)
CREAT SERPL-MCNC: 1.12 MG/DL (ref 0.6–1.3)
EOSINOPHIL # BLD MANUAL: 0.14 THOUSAND/UL (ref 0–0.4)
EOSINOPHIL # BLD MANUAL: 0.14 THOUSAND/UL (ref 0–0.4)
EOSINOPHIL NFR BLD MANUAL: 4 % (ref 0–6)
EOSINOPHIL NFR BLD MANUAL: 4 % (ref 0–6)
ERYTHROCYTE [DISTWIDTH] IN BLOOD BY AUTOMATED COUNT: 19.5 % (ref 11.6–15.1)
ERYTHROCYTE [DISTWIDTH] IN BLOOD BY AUTOMATED COUNT: 19.5 % (ref 11.6–15.1)
GFR SERPL CREATININE-BSD FRML MDRD: 69 ML/MIN/1.73SQ M
GFR SERPL CREATININE-BSD FRML MDRD: 69 ML/MIN/1.73SQ M
GLUCOSE SERPL-MCNC: 76 MG/DL (ref 65–140)
GLUCOSE SERPL-MCNC: 76 MG/DL (ref 65–140)
GLUCOSE SERPL-MCNC: 79 MG/DL (ref 65–140)
GLUCOSE SERPL-MCNC: 79 MG/DL (ref 65–140)
HCO3 BLDA-SCNC: 26.7 MMOL/L (ref 24–30)
HCO3 BLDA-SCNC: 26.7 MMOL/L (ref 24–30)
HCT VFR BLD AUTO: 36.7 % (ref 36.5–49.3)
HCT VFR BLD AUTO: 36.7 % (ref 36.5–49.3)
HCT VFR BLD CALC: 39 % (ref 36.5–49.3)
HCT VFR BLD CALC: 39 % (ref 36.5–49.3)
HGB BLD-MCNC: 12.1 G/DL (ref 12–17)
HGB BLD-MCNC: 12.1 G/DL (ref 12–17)
HGB BLDA-MCNC: 13.3 G/DL (ref 12–17)
HGB BLDA-MCNC: 13.3 G/DL (ref 12–17)
LYMPHOCYTES # BLD AUTO: 1.38 THOUSAND/UL (ref 0.6–4.47)
LYMPHOCYTES # BLD AUTO: 1.38 THOUSAND/UL (ref 0.6–4.47)
LYMPHOCYTES # BLD AUTO: 39 % (ref 14–44)
LYMPHOCYTES # BLD AUTO: 39 % (ref 14–44)
MACROCYTES BLD QL AUTO: PRESENT
MACROCYTES BLD QL AUTO: PRESENT
MAGNESIUM SERPL-MCNC: 1.3 MG/DL (ref 1.9–2.7)
MAGNESIUM SERPL-MCNC: 1.3 MG/DL (ref 1.9–2.7)
MCH RBC QN AUTO: 30.4 PG (ref 26.8–34.3)
MCH RBC QN AUTO: 30.4 PG (ref 26.8–34.3)
MCHC RBC AUTO-ENTMCNC: 33 G/DL (ref 31.4–37.4)
MCHC RBC AUTO-ENTMCNC: 33 G/DL (ref 31.4–37.4)
MCV RBC AUTO: 92 FL (ref 82–98)
MCV RBC AUTO: 92 FL (ref 82–98)
MONOCYTES # BLD AUTO: 0.14 THOUSAND/UL (ref 0–1.22)
MONOCYTES # BLD AUTO: 0.14 THOUSAND/UL (ref 0–1.22)
MONOCYTES NFR BLD: 4 % (ref 4–12)
MONOCYTES NFR BLD: 4 % (ref 4–12)
NEUTROPHILS # BLD MANUAL: 1.88 THOUSAND/UL (ref 1.85–7.62)
NEUTROPHILS # BLD MANUAL: 1.88 THOUSAND/UL (ref 1.85–7.62)
NEUTS BAND NFR BLD MANUAL: 1 % (ref 0–8)
NEUTS BAND NFR BLD MANUAL: 1 % (ref 0–8)
NEUTS SEG NFR BLD AUTO: 52 % (ref 43–75)
NEUTS SEG NFR BLD AUTO: 52 % (ref 43–75)
PCO2 BLD: 28 MMOL/L (ref 21–32)
PCO2 BLD: 28 MMOL/L (ref 21–32)
PCO2 BLD: 42.4 MM HG (ref 42–50)
PCO2 BLD: 42.4 MM HG (ref 42–50)
PH BLD: 7.41 [PH] (ref 7.3–7.4)
PH BLD: 7.41 [PH] (ref 7.3–7.4)
PLATELET # BLD AUTO: 94 THOUSANDS/UL (ref 149–390)
PLATELET # BLD AUTO: 94 THOUSANDS/UL (ref 149–390)
PLATELET BLD QL SMEAR: ABNORMAL
PLATELET BLD QL SMEAR: ABNORMAL
PMV BLD AUTO: 10.6 FL (ref 8.9–12.7)
PMV BLD AUTO: 10.6 FL (ref 8.9–12.7)
PO2 BLD: 41 MM HG (ref 35–45)
PO2 BLD: 41 MM HG (ref 35–45)
POTASSIUM BLD-SCNC: 3.4 MMOL/L (ref 3.5–5.3)
POTASSIUM BLD-SCNC: 3.4 MMOL/L (ref 3.5–5.3)
POTASSIUM SERPL-SCNC: 3.4 MMOL/L (ref 3.5–5.3)
POTASSIUM SERPL-SCNC: 3.4 MMOL/L (ref 3.5–5.3)
PROCALCITONIN SERPL-MCNC: 0.08 NG/ML
PROCALCITONIN SERPL-MCNC: 0.08 NG/ML
PROT SERPL-MCNC: 7.1 G/DL (ref 6.4–8.4)
PROT SERPL-MCNC: 7.1 G/DL (ref 6.4–8.4)
RBC # BLD AUTO: 3.98 MILLION/UL (ref 3.88–5.62)
RBC # BLD AUTO: 3.98 MILLION/UL (ref 3.88–5.62)
RBC MORPH BLD: PRESENT
RBC MORPH BLD: PRESENT
SAO2 % BLD FROM PO2: 77 % (ref 60–85)
SAO2 % BLD FROM PO2: 77 % (ref 60–85)
SODIUM BLD-SCNC: 139 MMOL/L (ref 136–145)
SODIUM BLD-SCNC: 139 MMOL/L (ref 136–145)
SODIUM SERPL-SCNC: 139 MMOL/L (ref 135–147)
SODIUM SERPL-SCNC: 139 MMOL/L (ref 135–147)
SPECIMEN SOURCE: ABNORMAL
SPECIMEN SOURCE: ABNORMAL
WBC # BLD AUTO: 3.54 THOUSAND/UL (ref 4.31–10.16)
WBC # BLD AUTO: 3.54 THOUSAND/UL (ref 4.31–10.16)

## 2025-04-30 PROCEDURE — 36415 COLL VENOUS BLD VENIPUNCTURE: CPT | Performed by: SURGERY

## 2025-04-30 PROCEDURE — 82947 ASSAY GLUCOSE BLOOD QUANT: CPT

## 2025-04-30 PROCEDURE — 84145 PROCALCITONIN (PCT): CPT

## 2025-04-30 PROCEDURE — 82803 BLOOD GASES ANY COMBINATION: CPT

## 2025-04-30 PROCEDURE — 85007 BL SMEAR W/DIFF WBC COUNT: CPT | Performed by: SURGERY

## 2025-04-30 PROCEDURE — 93005 ELECTROCARDIOGRAM TRACING: CPT

## 2025-04-30 PROCEDURE — 70450 CT HEAD/BRAIN W/O DYE: CPT

## 2025-04-30 PROCEDURE — 84132 ASSAY OF SERUM POTASSIUM: CPT

## 2025-04-30 PROCEDURE — 82330 ASSAY OF CALCIUM: CPT

## 2025-04-30 PROCEDURE — 72125 CT NECK SPINE W/O DYE: CPT

## 2025-04-30 PROCEDURE — 83735 ASSAY OF MAGNESIUM: CPT

## 2025-04-30 PROCEDURE — 99223 1ST HOSP IP/OBS HIGH 75: CPT | Performed by: HOSPITALIST

## 2025-04-30 PROCEDURE — 71045 X-RAY EXAM CHEST 1 VIEW: CPT

## 2025-04-30 PROCEDURE — 99223 1ST HOSP IP/OBS HIGH 75: CPT | Performed by: SURGERY

## 2025-04-30 PROCEDURE — EDAIR PR ED AIR: Performed by: EMERGENCY MEDICINE

## 2025-04-30 PROCEDURE — 99285 EMERGENCY DEPT VISIT HI MDM: CPT

## 2025-04-30 PROCEDURE — 82550 ASSAY OF CK (CPK): CPT

## 2025-04-30 PROCEDURE — 85014 HEMATOCRIT: CPT

## 2025-04-30 PROCEDURE — 85027 COMPLETE CBC AUTOMATED: CPT | Performed by: SURGERY

## 2025-04-30 PROCEDURE — 80053 COMPREHEN METABOLIC PANEL: CPT | Performed by: SURGERY

## 2025-04-30 PROCEDURE — 84295 ASSAY OF SERUM SODIUM: CPT

## 2025-04-30 PROCEDURE — 76705 ECHO EXAM OF ABDOMEN: CPT | Performed by: SURGERY

## 2025-04-30 RX ORDER — TAMSULOSIN HYDROCHLORIDE 0.4 MG/1
0.4 CAPSULE ORAL
Status: DISCONTINUED | OUTPATIENT
Start: 2025-05-01 | End: 2025-05-01 | Stop reason: HOSPADM

## 2025-04-30 RX ORDER — LIDOCAINE 50 MG/G
1 PATCH TOPICAL DAILY
Status: DISCONTINUED | OUTPATIENT
Start: 2025-05-01 | End: 2025-05-01 | Stop reason: HOSPADM

## 2025-04-30 RX ORDER — SACCHAROMYCES BOULARDII 250 MG
250 CAPSULE ORAL 2 TIMES DAILY
Status: DISCONTINUED | OUTPATIENT
Start: 2025-04-30 | End: 2025-05-01 | Stop reason: HOSPADM

## 2025-04-30 RX ORDER — PANTOPRAZOLE SODIUM 40 MG/1
40 TABLET, DELAYED RELEASE ORAL DAILY
COMMUNITY

## 2025-04-30 RX ORDER — LIDOCAINE 50 MG/G
1 PATCH TOPICAL DAILY
COMMUNITY

## 2025-04-30 RX ORDER — DILTIAZEM HYDROCHLORIDE 120 MG/1
120 CAPSULE, EXTENDED RELEASE ORAL 2 TIMES DAILY
COMMUNITY

## 2025-04-30 RX ORDER — FOLIC ACID 1 MG/1
1 TABLET ORAL DAILY
Status: DISCONTINUED | OUTPATIENT
Start: 2025-05-01 | End: 2025-04-30

## 2025-04-30 RX ORDER — SACCHAROMYCES BOULARDII 250 MG
250 CAPSULE ORAL 2 TIMES DAILY
COMMUNITY

## 2025-04-30 RX ORDER — METOPROLOL SUCCINATE 50 MG/1
150 TABLET, EXTENDED RELEASE ORAL EVERY 12 HOURS SCHEDULED
Status: DISCONTINUED | OUTPATIENT
Start: 2025-04-30 | End: 2025-05-01 | Stop reason: HOSPADM

## 2025-04-30 RX ORDER — MAGNESIUM HYDROXIDE/ALUMINUM HYDROXICE/SIMETHICONE 120; 1200; 1200 MG/30ML; MG/30ML; MG/30ML
30 SUSPENSION ORAL
Status: DISCONTINUED | OUTPATIENT
Start: 2025-04-30 | End: 2025-05-01 | Stop reason: HOSPADM

## 2025-04-30 RX ORDER — ATORVASTATIN CALCIUM 40 MG/1
40 TABLET, FILM COATED ORAL DAILY
COMMUNITY

## 2025-04-30 RX ORDER — IPRATROPIUM BROMIDE AND ALBUTEROL SULFATE 2.5; .5 MG/3ML; MG/3ML
3 SOLUTION RESPIRATORY (INHALATION)
Status: DISCONTINUED | OUTPATIENT
Start: 2025-04-30 | End: 2025-04-30

## 2025-04-30 RX ORDER — FLUTICASONE FUROATE AND VILANTEROL 200; 25 UG/1; UG/1
1 POWDER RESPIRATORY (INHALATION) DAILY
COMMUNITY

## 2025-04-30 RX ORDER — RANOLAZINE 500 MG/1
500 TABLET, EXTENDED RELEASE ORAL 2 TIMES DAILY
Status: DISCONTINUED | OUTPATIENT
Start: 2025-04-30 | End: 2025-05-01 | Stop reason: HOSPADM

## 2025-04-30 RX ORDER — CALCIUM GLUCONATE 20 MG/ML
1 INJECTION, SOLUTION INTRAVENOUS ONCE
Status: COMPLETED | OUTPATIENT
Start: 2025-04-30 | End: 2025-05-01

## 2025-04-30 RX ORDER — GABAPENTIN 300 MG/1
300 CAPSULE ORAL 3 TIMES DAILY
COMMUNITY

## 2025-04-30 RX ORDER — MULTIVITAMIN WITH IRON
100 TABLET ORAL DAILY
COMMUNITY

## 2025-04-30 RX ORDER — PYRIDOXINE HCL (VITAMIN B6) 50 MG
100 TABLET ORAL DAILY
Status: DISCONTINUED | OUTPATIENT
Start: 2025-05-01 | End: 2025-05-01 | Stop reason: HOSPADM

## 2025-04-30 RX ORDER — DILTIAZEM HYDROCHLORIDE 60 MG/1
120 CAPSULE, EXTENDED RELEASE ORAL 2 TIMES DAILY
Status: DISCONTINUED | OUTPATIENT
Start: 2025-04-30 | End: 2025-05-01 | Stop reason: HOSPADM

## 2025-04-30 RX ORDER — PANTOPRAZOLE SODIUM 40 MG/1
40 TABLET, DELAYED RELEASE ORAL
Status: DISCONTINUED | OUTPATIENT
Start: 2025-05-01 | End: 2025-05-01 | Stop reason: HOSPADM

## 2025-04-30 RX ORDER — ACETAMINOPHEN 325 MG/1
975 TABLET ORAL EVERY 8 HOURS PRN
Status: DISCONTINUED | OUTPATIENT
Start: 2025-04-30 | End: 2025-05-01 | Stop reason: HOSPADM

## 2025-04-30 RX ORDER — FOLIC ACID 1 MG/1
TABLET ORAL DAILY
COMMUNITY

## 2025-04-30 RX ORDER — ASPIRIN 81 MG/1
81 TABLET, CHEWABLE ORAL DAILY
COMMUNITY

## 2025-04-30 RX ORDER — POTASSIUM CHLORIDE 1500 MG/1
40 TABLET, EXTENDED RELEASE ORAL ONCE
Status: COMPLETED | OUTPATIENT
Start: 2025-04-30 | End: 2025-04-30

## 2025-04-30 RX ORDER — RANOLAZINE 500 MG/1
500 TABLET, EXTENDED RELEASE ORAL 2 TIMES DAILY
COMMUNITY

## 2025-04-30 RX ORDER — SENNA AND DOCUSATE SODIUM 50; 8.6 MG/1; MG/1
1 TABLET, FILM COATED ORAL DAILY
COMMUNITY

## 2025-04-30 RX ORDER — NICOTINE 21 MG/24HR
1 PATCH, TRANSDERMAL 24 HOURS TRANSDERMAL EVERY 24 HOURS
COMMUNITY

## 2025-04-30 RX ORDER — NICOTINE 21 MG/24HR
1 PATCH, TRANSDERMAL 24 HOURS TRANSDERMAL EVERY 24 HOURS
Status: DISCONTINUED | OUTPATIENT
Start: 2025-04-30 | End: 2025-05-01 | Stop reason: HOSPADM

## 2025-04-30 RX ORDER — NALTREXONE HYDROCHLORIDE 50 MG/1
50 TABLET, FILM COATED ORAL DAILY
COMMUNITY

## 2025-04-30 RX ORDER — INSULIN LISPRO 100 [IU]/ML
1-5 INJECTION, SOLUTION INTRAVENOUS; SUBCUTANEOUS
Status: DISCONTINUED | OUTPATIENT
Start: 2025-05-01 | End: 2025-05-01 | Stop reason: HOSPADM

## 2025-04-30 RX ORDER — NALTREXONE HYDROCHLORIDE 50 MG/1
50 TABLET, FILM COATED ORAL DAILY
Status: DISCONTINUED | OUTPATIENT
Start: 2025-05-01 | End: 2025-05-01 | Stop reason: HOSPADM

## 2025-04-30 RX ORDER — ALUMINUM HYDROXIDE, MAGNESIUM HYDROXIDE, SIMETHICONE 400; 400; 40 MG/10ML; MG/10ML; MG/10ML
30 SUSPENSION ORAL
COMMUNITY

## 2025-04-30 RX ORDER — AMOXICILLIN 250 MG
1 CAPSULE ORAL DAILY
Status: DISCONTINUED | OUTPATIENT
Start: 2025-05-01 | End: 2025-05-01 | Stop reason: HOSPADM

## 2025-04-30 RX ORDER — FLUTICASONE FUROATE AND VILANTEROL 200; 25 UG/1; UG/1
1 POWDER RESPIRATORY (INHALATION) DAILY
Status: DISCONTINUED | OUTPATIENT
Start: 2025-05-01 | End: 2025-05-01 | Stop reason: HOSPADM

## 2025-04-30 RX ORDER — ALBUTEROL SULFATE 90 UG/1
2 INHALANT RESPIRATORY (INHALATION) EVERY 4 HOURS PRN
COMMUNITY

## 2025-04-30 RX ORDER — ATORVASTATIN CALCIUM 40 MG/1
40 TABLET, FILM COATED ORAL
Status: DISCONTINUED | OUTPATIENT
Start: 2025-05-01 | End: 2025-05-01 | Stop reason: HOSPADM

## 2025-04-30 RX ORDER — METOPROLOL SUCCINATE 100 MG/1
150 TABLET, EXTENDED RELEASE ORAL EVERY 12 HOURS
COMMUNITY

## 2025-04-30 RX ORDER — FERROUS SULFATE 325(65) MG
325 TABLET ORAL
Status: DISCONTINUED | OUTPATIENT
Start: 2025-05-01 | End: 2025-05-01 | Stop reason: HOSPADM

## 2025-04-30 RX ORDER — GABAPENTIN 300 MG/1
300 CAPSULE ORAL 3 TIMES DAILY
Status: DISCONTINUED | OUTPATIENT
Start: 2025-04-30 | End: 2025-05-01 | Stop reason: HOSPADM

## 2025-04-30 RX ORDER — ALBUTEROL SULFATE 90 UG/1
2 INHALANT RESPIRATORY (INHALATION) EVERY 4 HOURS PRN
Status: DISCONTINUED | OUTPATIENT
Start: 2025-04-30 | End: 2025-05-01 | Stop reason: HOSPADM

## 2025-04-30 RX ORDER — ENOXAPARIN SODIUM 100 MG/ML
40 INJECTION SUBCUTANEOUS DAILY
Status: DISCONTINUED | OUTPATIENT
Start: 2025-05-01 | End: 2025-04-30

## 2025-04-30 RX ORDER — MAGNESIUM SULFATE HEPTAHYDRATE 40 MG/ML
2 INJECTION, SOLUTION INTRAVENOUS
Status: COMPLETED | OUTPATIENT
Start: 2025-04-30 | End: 2025-05-01

## 2025-04-30 RX ORDER — TAMSULOSIN HYDROCHLORIDE 0.4 MG/1
0.4 CAPSULE ORAL
COMMUNITY

## 2025-04-30 RX ORDER — FERROUS SULFATE 325(65) MG
325 TABLET ORAL
COMMUNITY

## 2025-04-30 RX ORDER — ASPIRIN 81 MG/1
81 TABLET, CHEWABLE ORAL DAILY
Status: DISCONTINUED | OUTPATIENT
Start: 2025-05-01 | End: 2025-05-01 | Stop reason: HOSPADM

## 2025-04-30 RX ADMIN — CEFTRIAXONE SODIUM 1000 MG: 10 INJECTION, POWDER, FOR SOLUTION INTRAVENOUS at 21:12

## 2025-04-30 RX ADMIN — POTASSIUM CHLORIDE 40 MEQ: 1500 TABLET, EXTENDED RELEASE ORAL at 23:03

## 2025-04-30 RX ADMIN — NICOTINE 1 PATCH: 21 PATCH, EXTENDED RELEASE TRANSDERMAL at 23:02

## 2025-04-30 RX ADMIN — Medication 250 MG: at 23:02

## 2025-04-30 RX ADMIN — RANOLAZINE 500 MG: 500 TABLET, FILM COATED, EXTENDED RELEASE ORAL at 23:56

## 2025-04-30 RX ADMIN — ALUMINUM HYDROXIDE, MAGNESIUM HYDROXIDE, AND DIMETHICONE 30 ML: 200; 20; 200 SUSPENSION ORAL at 23:02

## 2025-04-30 RX ADMIN — CALCIUM GLUCONATE 1 G: 20 INJECTION, SOLUTION INTRAVENOUS at 23:56

## 2025-04-30 RX ADMIN — DILTIAZEM HYDROCHLORIDE 120 MG: 60 CAPSULE, EXTENDED RELEASE ORAL at 23:56

## 2025-04-30 RX ADMIN — APIXABAN 5 MG: 5 TABLET, FILM COATED ORAL at 23:56

## 2025-04-30 RX ADMIN — METOPROLOL SUCCINATE 150 MG: 50 TABLET, EXTENDED RELEASE ORAL at 23:02

## 2025-04-30 RX ADMIN — GABAPENTIN 300 MG: 300 CAPSULE ORAL at 23:02

## 2025-04-30 NOTE — TELEPHONE ENCOUNTER
Contacted patient via phone number, no answer. Left a message to call us back to reschedule missed appointment.    Letter sent

## 2025-04-30 NOTE — H&P
"H&P - Trauma   Name: Srinivasa Walters 65 y.o. male I MRN: [id number]  Unit/Bed#: ED-31 I Date of Admission: 4/30/2025   Date of Service: 4/30/2025 I Hospital Day: 0     Assessment & Plan  Fall, initial encounter  - Status post fall with the below noted injuries.  - Fall precautions.  - No traumatic injuries on workup    Aspiration pneumonia (HCC)  Coarse Rales over B/L LL, satting 88% on RA.  -Initiated on ceftriaxone  -Admitted to medicine for further management.  Alcohol use disorder  Based on chart review this is a long standing issue. Patient states that he had 2-3 glasses of vodka today.    Trauma Alert: Level B   Model of Arrival: Ambulance    Trauma Team: Attending Fernandez and Residents Suman  Consultants:     None     History of Present Illness   Chief Complaint: presumed fall while intoxicated  Mechanism:Fall     Srinivasa Walters is a 65 y.o. male who presents to the trauma bay for a fall on [facility].  Patient was found down by his bedside after a presumed fall.  Patient denies syncope.  States that he has had \"2 to 3 glasses of vodka today\" which he states is a small amount for him. Patient refused C-collar and prevented us from applying it.    Review of Systems   Unable to perform ROS: Other (intoxicated)     Medical History Review: I have reviewed the patient's PMH, PSH, Social History, Family History, Meds, and Allergies     There is no immunization history on file for this patient.  Last Tetanus: 2/2025         Objective :  Temp:  [97.9 °F (36.6 °C)] 97.9 °F (36.6 °C)  HR:  [103-111] 107  BP: (101-136)/(65-92) 112/75  Resp:  [16-20] 18  SpO2:  [89 %-100 %] 95 %  O2 Device: Nasal cannula    Initial Vitals:   Temperature: 97.9 °F (36.6 °C) (04/30/25 1931)  Pulse: 105 (04/30/25 1931)  Respirations: 20 (04/30/25 1931)  Blood Pressure: 136/92 (04/30/25 1931)    Primary Survey:   Airway:        Status: patent;        Pre-hospital Interventions: none        Hospital Interventions: none  Breathing:        " Pre-hospital Interventions: oxygen       Effort: normal       Right breath sounds: ; rales       Left breath sounds: ; rales  Circulation:        Rhythm: regular       Rate: regular   Right Pulses Left Pulses    R radial: 2+    R pedal: 2+     L radial: 2+    L pedal: 2+       Disability:        GCS: Eye: 4; Verbal: 5 Motor: 6 Total: 15       Right Pupil: 4 mm;  round;  reactive         Left Pupil:  4 mm;  round;  reactive      R Motor Strength L Motor Strength    R : 5/5  R dorsiflex: 5/5  R plantarflex: 5/5 L : 5/5  L dorsiflex: 5/5  L plantarflex: 5/5        Sensory:  No sensory deficit  Exposure:       Completed: Yes      Secondary Survey:  Physical Exam  Vitals and nursing note reviewed.   Constitutional:       Comments: Clinically intoxicated   HENT:      Head: Normocephalic and atraumatic.      Right Ear: External ear normal.      Left Ear: External ear normal.      Mouth/Throat:      Mouth: Mucous membranes are moist.      Pharynx: Oropharynx is clear.   Eyes:      Extraocular Movements: Extraocular movements intact.      Conjunctiva/sclera: Conjunctivae normal.      Pupils: Pupils are equal, round, and reactive to light.   Neck:      Comments: Limited range of motion.  Old surgical scar well-healed.  Musculoskeletal:         General: Normal range of motion.   Skin:     Comments: Multiple lacerations distributed across all extremities, well scabbed no evidence of infection currently staples in left glute, lesion appears well healed   Neurological:      General: No focal deficit present.      Mental Status: He is alert.      Comments: A&Ox2, oriented to self and that he is in a hospital, cannot provide the month (answers january) or president             Lab Results: I have reviewed the following results:  Recent Labs     04/30/25 1940 04/30/25 1944   WBC 3.54*  --    HGB 12.1 13.3   HCT 36.7 39   PLT 94*  --    BANDSPCT 1  --    SODIUM 139  --    K 3.4*  --    CL 98  --    CO2 27 28   BUN 10  --     CREATININE 1.12  --    GLUC 76  --    CAIONIZED  --  0.87*   AST 78*  --    ALT 26  --    ALB 3.5  --    TBILI 0.73  --    ALKPHOS 170*  --        Imaging Results: I have personally reviewed pertinent images saved in PACS. CT scan findings (and other pertinent positive findings on images) were discussed with radiology. My interpretation of the images/reports are as follows:  Chest Xray(s): positive for acute findings: opacity in RLL    FAST exam(s): negative for acute findings   CT Scan(s): Negative for acute findings   Additional Xray(s): N/A     Other Studies: Other Study Results Review: No additional pertinent studies reviewed.

## 2025-04-30 NOTE — TELEPHONE ENCOUNTER
Drug-Disease interaction paperwork from [facility]  Scanned into encounter     Please review

## 2025-04-30 NOTE — ED PROVIDER NOTES
Emergency Department Airway Evaluation and Management Form    History  Obtained from: EMS  Patient has no allergy information on record.  No chief complaint on file.    HPI    64 y/o fall, hit head, on thinner.    Airway intact, rest of eval and treatment by trauma team    No past medical history on file.  No past surgical history on file.  No family history on file.     I have reviewed and agree with the history as documented.    Review of Systems    Physical Exam  There were no vitals taken for this visit.    Physical Exam    ED Medications  Medications - No data to display    Intubation  Procedures    Notes      Final Diagnosis  Final diagnoses:   None       ED Provider  Electronically Signed by     Mira Larios MD  04/30/25 [id number]

## 2025-04-30 NOTE — PROCEDURES
POC FAST US    Date/Time: 4/30/2025 7:58 PM    Performed by: Libby Will MD  Authorized by: Libby Will MD    Procedure details:     Technique: FAST      Views obtained:  LUQ - Splenorenal space, RUQ - Mayfield's Pouch and Suprapubic - Pouch of Shahram    Image quality: diagnostic      Image availability:  Images available in PACS  FAST Findings:     RUQ (Hepatorenal) free fluid: absent      LUQ (Splenorenal) free fluid: absent      Suprapubic free fluid: absent    Interpretation:     Impressions: negative

## 2025-05-01 ENCOUNTER — DOCUMENTATION (OUTPATIENT)
Age: 65
End: 2025-05-01

## 2025-05-01 VITALS
DIASTOLIC BLOOD PRESSURE: 90 MMHG | SYSTOLIC BLOOD PRESSURE: 168 MMHG | HEIGHT: 74 IN | TEMPERATURE: 98 F | BODY MASS INDEX: 21.5 KG/M2 | OXYGEN SATURATION: 88 % | DIASTOLIC BLOOD PRESSURE: 90 MMHG | HEART RATE: 79 BPM | RESPIRATION RATE: 18 BRPM | RESPIRATION RATE: 18 BRPM | WEIGHT: 167.55 LBS | HEIGHT: 74 IN | OXYGEN SATURATION: 88 % | TEMPERATURE: 98 F | BODY MASS INDEX: 21.5 KG/M2 | HEART RATE: 79 BPM | SYSTOLIC BLOOD PRESSURE: 168 MMHG | WEIGHT: 167.55 LBS

## 2025-05-01 LAB
ALBUMIN SERPL BCG-MCNC: 3 G/DL (ref 3.5–5)
ALBUMIN SERPL BCG-MCNC: 3 G/DL (ref 3.5–5)
ALP SERPL-CCNC: 152 U/L (ref 34–104)
ALP SERPL-CCNC: 152 U/L (ref 34–104)
ALT SERPL W P-5'-P-CCNC: 21 U/L (ref 7–52)
ALT SERPL W P-5'-P-CCNC: 21 U/L (ref 7–52)
ANION GAP SERPL CALCULATED.3IONS-SCNC: 13 MMOL/L (ref 4–13)
ANION GAP SERPL CALCULATED.3IONS-SCNC: 13 MMOL/L (ref 4–13)
AST SERPL W P-5'-P-CCNC: 63 U/L (ref 13–39)
AST SERPL W P-5'-P-CCNC: 63 U/L (ref 13–39)
ATRIAL RATE: 144 BPM
ATRIAL RATE: 144 BPM
ATRIAL RATE: 234 BPM
ATRIAL RATE: 234 BPM
BASOPHILS # BLD AUTO: 0.08 THOUSANDS/ÂΜL (ref 0–0.1)
BASOPHILS # BLD AUTO: 0.08 THOUSANDS/ÂΜL (ref 0–0.1)
BASOPHILS NFR BLD AUTO: 2 % (ref 0–1)
BASOPHILS NFR BLD AUTO: 2 % (ref 0–1)
BILIRUB SERPL-MCNC: 0.52 MG/DL (ref 0.2–1)
BILIRUB SERPL-MCNC: 0.52 MG/DL (ref 0.2–1)
BUN SERPL-MCNC: 11 MG/DL (ref 5–25)
BUN SERPL-MCNC: 11 MG/DL (ref 5–25)
CA-I BLD-SCNC: 0.97 MMOL/L (ref 1.12–1.32)
CA-I BLD-SCNC: 0.97 MMOL/L (ref 1.12–1.32)
CALCIUM ALBUM COR SERPL-MCNC: 8 MG/DL (ref 8.3–10.1)
CALCIUM ALBUM COR SERPL-MCNC: 8 MG/DL (ref 8.3–10.1)
CALCIUM SERPL-MCNC: 7.2 MG/DL (ref 8.4–10.2)
CALCIUM SERPL-MCNC: 7.2 MG/DL (ref 8.4–10.2)
CHLORIDE SERPL-SCNC: 101 MMOL/L (ref 96–108)
CHLORIDE SERPL-SCNC: 101 MMOL/L (ref 96–108)
CO2 SERPL-SCNC: 26 MMOL/L (ref 21–32)
CO2 SERPL-SCNC: 26 MMOL/L (ref 21–32)
CREAT SERPL-MCNC: 1.15 MG/DL (ref 0.6–1.3)
CREAT SERPL-MCNC: 1.15 MG/DL (ref 0.6–1.3)
EOSINOPHIL # BLD AUTO: 0.14 THOUSAND/ÂΜL (ref 0–0.61)
EOSINOPHIL # BLD AUTO: 0.14 THOUSAND/ÂΜL (ref 0–0.61)
EOSINOPHIL NFR BLD AUTO: 4 % (ref 0–6)
EOSINOPHIL NFR BLD AUTO: 4 % (ref 0–6)
ERYTHROCYTE [DISTWIDTH] IN BLOOD BY AUTOMATED COUNT: 19.7 % (ref 11.6–15.1)
ERYTHROCYTE [DISTWIDTH] IN BLOOD BY AUTOMATED COUNT: 19.7 % (ref 11.6–15.1)
ETHANOL SERPL-MCNC: 349 MG/DL
ETHANOL SERPL-MCNC: 349 MG/DL
GFR SERPL CREATININE-BSD FRML MDRD: 67 ML/MIN/1.73SQ M
GFR SERPL CREATININE-BSD FRML MDRD: 67 ML/MIN/1.73SQ M
GLUCOSE SERPL-MCNC: 130 MG/DL (ref 65–140)
GLUCOSE SERPL-MCNC: 130 MG/DL (ref 65–140)
GLUCOSE SERPL-MCNC: 83 MG/DL (ref 65–140)
GLUCOSE SERPL-MCNC: 83 MG/DL (ref 65–140)
GLUCOSE SERPL-MCNC: 85 MG/DL (ref 65–140)
GLUCOSE SERPL-MCNC: 85 MG/DL (ref 65–140)
HCT VFR BLD AUTO: 33.6 % (ref 36.5–49.3)
HCT VFR BLD AUTO: 33.6 % (ref 36.5–49.3)
HGB BLD-MCNC: 10.9 G/DL (ref 12–17)
HGB BLD-MCNC: 10.9 G/DL (ref 12–17)
IMM GRANULOCYTES # BLD AUTO: 0.01 THOUSAND/UL (ref 0–0.2)
IMM GRANULOCYTES # BLD AUTO: 0.01 THOUSAND/UL (ref 0–0.2)
IMM GRANULOCYTES NFR BLD AUTO: 0 % (ref 0–2)
IMM GRANULOCYTES NFR BLD AUTO: 0 % (ref 0–2)
LYMPHOCYTES # BLD AUTO: 1.24 THOUSANDS/ÂΜL (ref 0.6–4.47)
LYMPHOCYTES # BLD AUTO: 1.24 THOUSANDS/ÂΜL (ref 0.6–4.47)
LYMPHOCYTES NFR BLD AUTO: 35 % (ref 14–44)
LYMPHOCYTES NFR BLD AUTO: 35 % (ref 14–44)
MAGNESIUM SERPL-MCNC: 2.3 MG/DL (ref 1.9–2.7)
MAGNESIUM SERPL-MCNC: 2.3 MG/DL (ref 1.9–2.7)
MCH RBC QN AUTO: 30.4 PG (ref 26.8–34.3)
MCH RBC QN AUTO: 30.4 PG (ref 26.8–34.3)
MCHC RBC AUTO-ENTMCNC: 32.4 G/DL (ref 31.4–37.4)
MCHC RBC AUTO-ENTMCNC: 32.4 G/DL (ref 31.4–37.4)
MCV RBC AUTO: 94 FL (ref 82–98)
MCV RBC AUTO: 94 FL (ref 82–98)
MONOCYTES # BLD AUTO: 0.37 THOUSAND/ÂΜL (ref 0.17–1.22)
MONOCYTES # BLD AUTO: 0.37 THOUSAND/ÂΜL (ref 0.17–1.22)
MONOCYTES NFR BLD AUTO: 11 % (ref 4–12)
MONOCYTES NFR BLD AUTO: 11 % (ref 4–12)
NEUTROPHILS # BLD AUTO: 1.67 THOUSANDS/ÂΜL (ref 1.85–7.62)
NEUTROPHILS # BLD AUTO: 1.67 THOUSANDS/ÂΜL (ref 1.85–7.62)
NEUTS SEG NFR BLD AUTO: 48 % (ref 43–75)
NEUTS SEG NFR BLD AUTO: 48 % (ref 43–75)
NRBC BLD AUTO-RTO: 0 /100 WBCS
NRBC BLD AUTO-RTO: 0 /100 WBCS
PLATELET # BLD AUTO: 83 THOUSANDS/UL (ref 149–390)
PLATELET # BLD AUTO: 83 THOUSANDS/UL (ref 149–390)
PLATELET # BLD AUTO: 85 THOUSANDS/UL (ref 149–390)
PLATELET # BLD AUTO: 85 THOUSANDS/UL (ref 149–390)
PMV BLD AUTO: 10.3 FL (ref 8.9–12.7)
PMV BLD AUTO: 10.3 FL (ref 8.9–12.7)
PMV BLD AUTO: 11.3 FL (ref 8.9–12.7)
PMV BLD AUTO: 11.3 FL (ref 8.9–12.7)
POTASSIUM SERPL-SCNC: 3.6 MMOL/L (ref 3.5–5.3)
POTASSIUM SERPL-SCNC: 3.6 MMOL/L (ref 3.5–5.3)
PROCALCITONIN SERPL-MCNC: 0.09 NG/ML
PROCALCITONIN SERPL-MCNC: 0.09 NG/ML
PROT SERPL-MCNC: 6.2 G/DL (ref 6.4–8.4)
PROT SERPL-MCNC: 6.2 G/DL (ref 6.4–8.4)
QRS AXIS: 104 DEGREES
QRS AXIS: 104 DEGREES
QRS AXIS: 86 DEGREES
QRS AXIS: 86 DEGREES
QRSD INTERVAL: 100 MS
QRSD INTERVAL: 100 MS
QRSD INTERVAL: 98 MS
QRSD INTERVAL: 98 MS
QT INTERVAL: 292 MS
QT INTERVAL: 292 MS
QT INTERVAL: 358 MS
QT INTERVAL: 358 MS
QTC INTERVAL: 424 MS
QTC INTERVAL: 424 MS
QTC INTERVAL: 484 MS
QTC INTERVAL: 484 MS
RBC # BLD AUTO: 3.58 MILLION/UL (ref 3.88–5.62)
RBC # BLD AUTO: 3.58 MILLION/UL (ref 3.88–5.62)
SODIUM SERPL-SCNC: 140 MMOL/L (ref 135–147)
SODIUM SERPL-SCNC: 140 MMOL/L (ref 135–147)
T WAVE AXIS: -29 DEGREES
T WAVE AXIS: -29 DEGREES
T WAVE AXIS: 1 DEGREES
T WAVE AXIS: 1 DEGREES
VENTRICULAR RATE: 110 BPM
VENTRICULAR RATE: 110 BPM
VENTRICULAR RATE: 127 BPM
VENTRICULAR RATE: 127 BPM
WBC # BLD AUTO: 3.51 THOUSAND/UL (ref 4.31–10.16)
WBC # BLD AUTO: 3.51 THOUSAND/UL (ref 4.31–10.16)

## 2025-05-01 PROCEDURE — 83735 ASSAY OF MAGNESIUM: CPT | Performed by: STUDENT IN AN ORGANIZED HEALTH CARE EDUCATION/TRAINING PROGRAM

## 2025-05-01 PROCEDURE — 82948 REAGENT STRIP/BLOOD GLUCOSE: CPT

## 2025-05-01 PROCEDURE — 82330 ASSAY OF CALCIUM: CPT | Performed by: STUDENT IN AN ORGANIZED HEALTH CARE EDUCATION/TRAINING PROGRAM

## 2025-05-01 PROCEDURE — 84145 PROCALCITONIN (PCT): CPT

## 2025-05-01 PROCEDURE — 93010 ELECTROCARDIOGRAM REPORT: CPT | Performed by: INTERNAL MEDICINE

## 2025-05-01 PROCEDURE — 85025 COMPLETE CBC W/AUTO DIFF WBC: CPT | Performed by: STUDENT IN AN ORGANIZED HEALTH CARE EDUCATION/TRAINING PROGRAM

## 2025-05-01 PROCEDURE — 99239 HOSP IP/OBS DSCHRG MGMT >30: CPT | Performed by: HOSPITALIST

## 2025-05-01 PROCEDURE — 85049 AUTOMATED PLATELET COUNT: CPT

## 2025-05-01 PROCEDURE — 80053 COMPREHEN METABOLIC PANEL: CPT | Performed by: STUDENT IN AN ORGANIZED HEALTH CARE EDUCATION/TRAINING PROGRAM

## 2025-05-01 PROCEDURE — 82077 ASSAY SPEC XCP UR&BREATH IA: CPT

## 2025-05-01 RX ORDER — CALCIUM GLUCONATE 20 MG/ML
2 INJECTION, SOLUTION INTRAVENOUS ONCE
Status: COMPLETED | OUTPATIENT
Start: 2025-05-01 | End: 2025-05-01

## 2025-05-01 RX ORDER — POTASSIUM CHLORIDE 1500 MG/1
20 TABLET, EXTENDED RELEASE ORAL ONCE
Status: COMPLETED | OUTPATIENT
Start: 2025-05-01 | End: 2025-05-01

## 2025-05-01 RX ADMIN — FLUTICASONE FUROATE AND VILANTEROL TRIFENATATE 1 PUFF: 200; 25 POWDER RESPIRATORY (INHALATION) at 09:32

## 2025-05-01 RX ADMIN — SENNOSIDES AND DOCUSATE SODIUM 1 TABLET: 50; 8.6 TABLET ORAL at 09:32

## 2025-05-01 RX ADMIN — PYRIDOXINE HCL TAB 50 MG 100 MG: 50 TAB at 09:32

## 2025-05-01 RX ADMIN — Medication 250 MG: at 18:17

## 2025-05-01 RX ADMIN — MAGNESIUM SULFATE HEPTAHYDRATE 2 G: 40 INJECTION, SOLUTION INTRAVENOUS at 01:57

## 2025-05-01 RX ADMIN — ALUMINUM HYDROXIDE, MAGNESIUM HYDROXIDE, AND DIMETHICONE 30 ML: 200; 20; 200 SUSPENSION ORAL at 12:27

## 2025-05-01 RX ADMIN — APIXABAN 5 MG: 5 TABLET, FILM COATED ORAL at 09:32

## 2025-05-01 RX ADMIN — DILTIAZEM HYDROCHLORIDE 120 MG: 60 CAPSULE, EXTENDED RELEASE ORAL at 18:17

## 2025-05-01 RX ADMIN — ASPIRIN 81 MG: 81 TABLET, CHEWABLE ORAL at 09:32

## 2025-05-01 RX ADMIN — LIDOCAINE 1 PATCH: 50 PATCH CUTANEOUS at 09:32

## 2025-05-01 RX ADMIN — THIAMINE HYDROCHLORIDE: 100 INJECTION, SOLUTION INTRAMUSCULAR; INTRAVENOUS at 11:21

## 2025-05-01 RX ADMIN — RANOLAZINE 500 MG: 500 TABLET, FILM COATED, EXTENDED RELEASE ORAL at 18:16

## 2025-05-01 RX ADMIN — GABAPENTIN 300 MG: 300 CAPSULE ORAL at 18:16

## 2025-05-01 RX ADMIN — ALUMINUM HYDROXIDE, MAGNESIUM HYDROXIDE, AND DIMETHICONE 30 ML: 200; 20; 200 SUSPENSION ORAL at 18:16

## 2025-05-01 RX ADMIN — ATORVASTATIN CALCIUM 40 MG: 40 TABLET, FILM COATED ORAL at 18:17

## 2025-05-01 RX ADMIN — NALTREXONE HYDROCHLORIDE 50 MG: 50 TABLET, FILM COATED ORAL at 09:36

## 2025-05-01 RX ADMIN — MAGNESIUM SULFATE HEPTAHYDRATE 2 G: 40 INJECTION, SOLUTION INTRAVENOUS at 00:23

## 2025-05-01 RX ADMIN — TAMSULOSIN HYDROCHLORIDE 0.4 MG: 0.4 CAPSULE ORAL at 18:17

## 2025-05-01 RX ADMIN — APIXABAN 5 MG: 5 TABLET, FILM COATED ORAL at 18:16

## 2025-05-01 RX ADMIN — RANOLAZINE 500 MG: 500 TABLET, FILM COATED, EXTENDED RELEASE ORAL at 09:32

## 2025-05-01 RX ADMIN — DILTIAZEM HYDROCHLORIDE 120 MG: 60 CAPSULE, EXTENDED RELEASE ORAL at 11:21

## 2025-05-01 RX ADMIN — FERROUS SULFATE TAB 325 MG (65 MG ELEMENTAL FE) 325 MG: 325 (65 FE) TAB at 09:32

## 2025-05-01 RX ADMIN — CALCIUM GLUCONATE 2 G: 20 INJECTION, SOLUTION INTRAVENOUS at 06:41

## 2025-05-01 RX ADMIN — GABAPENTIN 300 MG: 300 CAPSULE ORAL at 09:32

## 2025-05-01 RX ADMIN — Medication 250 MG: at 09:32

## 2025-05-01 RX ADMIN — POTASSIUM CHLORIDE 20 MEQ: 1500 TABLET, EXTENDED RELEASE ORAL at 06:19

## 2025-05-01 NOTE — PLAN OF CARE
Problem: PAIN - ADULT  Goal: Verbalizes/displays adequate comfort level or baseline comfort level  Description: Interventions:- Encourage patient to monitor pain and request assistance- Assess pain using appropriate pain scale- Administer analgesics based on type and severity of pain and evaluate response- Implement non-pharmacological measures as appropriate and evaluate response- Consider cultural and social influences on pain and pain management- Notify physician/advanced practitioner if interventions unsuccessful or patient reports new pain  Outcome: Progressing     Problem: INFECTION - ADULT  Goal: Absence or prevention of progression during hospitalization  Description: INTERVENTIONS:- Assess and monitor for signs and symptoms of infection- Monitor lab/diagnostic results- Monitor all insertion sites, i.e. indwelling lines, tubes, and drains- Monitor endotracheal if appropriate and nasal secretions for changes in amount and color- Hazen appropriate cooling/warming therapies per order- Administer medications as ordered- Instruct and encourage patient and family to use good hand hygiene technique- Identify and instruct in appropriate isolation precautions for identified infection/condition  Outcome: Progressing  Goal: Absence of fever/infection during neutropenic period  Description: INTERVENTIONS:- Monitor WBC  Outcome: Progressing     Problem: SAFETY ADULT  Goal: Patient will remain free of falls  Description: INTERVENTIONS:- Educate patient/family on patient safety including physical limitations- Instruct patient to call for assistance with activity - Consult OT/PT to assist with strengthening/mobility - Keep Call bell within reach- Keep bed low and locked with side rails adjusted as appropriate- Keep care items and personal belongings within reach- Initiate and maintain comfort rounds- Make Fall Risk Sign visible to staff- Offer Toileting every  Hours, in advance of need- Initiate/Maintain alarm- Obtain  necessary fall risk management equipment: - Apply yellow socks and bracelet for high fall risk patients- Consider moving patient to room near nurses station  Outcome: Progressing  Goal: Maintain or return to baseline ADL function  Description: INTERVENTIONS:-  Assess patient's ability to carry out ADLs; assess patient's baseline for ADL function and identify physical deficits which impact ability to perform ADLs (bathing, care of mouth/teeth, toileting, grooming, dressing, etc.)- Assess/evaluate cause of self-care deficits - Assess range of motion- Assess patient's mobility; develop plan if impaired- Assess patient's need for assistive devices and provide as appropriate- Encourage maximum independence but intervene and supervise when necessary- Involve family in performance of ADLs- Assess for home care needs following discharge - Consider OT consult to assist with ADL evaluation and planning for discharge- Provide patient education as appropriate  Outcome: Progressing  Goal: Maintains/Returns to pre admission functional level  Description: INTERVENTIONS:- Perform AM-PAC 6 Click Basic Mobility/ Daily Activity assessment daily.- Set and communicate daily mobility goal to care team and patient/family/caregiver. - Collaborate with rehabilitation services on mobility goals if consulted- Perform Range of Motion  times a day.- Reposition patient every  hours.- Dangle patient  times a day- Stand patient  times a day- Ambulate patient  times a day- Out of bed to chair  times a day - Out of bed for meals  times a day- Out of bed for toileting- Record patient progress and toleration of activity level   Outcome: Progressing     Problem: DISCHARGE PLANNING  Goal: Discharge to home or other facility with appropriate resources  Description: INTERVENTIONS:- Identify barriers to discharge w/patient and caregiver- Arrange for needed discharge resources and transportation as appropriate- Identify discharge learning needs (meds, wound  care, etc.)- Arrange for interpretive services to assist at discharge as needed- Refer to Case Management Department for coordinating discharge planning if the patient needs post-hospital services based on physician/advanced practitioner order or complex needs related to functional status, cognitive ability, or social support system  Outcome: Progressing     Problem: Knowledge Deficit  Goal: Patient/family/caregiver demonstrates understanding of disease process, treatment plan, medications, and discharge instructions  Description: Complete learning assessment and assess knowledge base.Interventions:- Provide teaching at level of understanding- Provide teaching via preferred learning methods  Outcome: Progressing

## 2025-05-01 NOTE — ASSESSMENT & PLAN NOTE
>>ASSESSMENT AND PLAN FOR COPD (CHRONIC OBSTRUCTIVE PULMONARY DISEASE) (HCC) WRITTEN ON 6/5/2025 10:43 PM BY DAVIDSON WEINSTEIN MD     >>ASSESSMENT AND PLAN FOR SOB (SHORTNESS OF BREATH) WRITTEN ON 5/1/2025  3:48 PM BY VERONIKA GUIDO MD    Patient admitted to [facility] after cleared trauma eval for suspicion of possibly aspiration PNA. Given ceftriaxone x1 in ED. Patient denies any sick symptoms such as fever,chills increased cough or congestion. Patient is afebrile without leukocytosis. States he has O2 at home. On initial exam: scattered rhonchi throughout all lung fields and mildly diminished air movement no wheezing. Saturating 94 percent on 2L. Low suspicion of Aspiration PNA (although patient is now covered for 24 hours due to lack of above symptoms). Possible aspiration pneumonitis in setting of alcohol abuse versus COPD exacerbation although is not wheezing and appears to be @ baseline respiratory status. Procal (-). Duo nebs ordered while inpatient.  CXR: No acute pathology    Plan:  Ambulatory referral to pulm provided on discharge  Continue to monitor off antibiotics in the outpatient setting  Supplemental oxygen at home for comfort  Resume home albuterol on discharge  Continue home Bro-ellipta daily      >>ASSESSMENT AND PLAN FOR COPD (CHRONIC OBSTRUCTIVE PULMONARY DISEASE) (HCC) WRITTEN ON 5/1/2025  3:48 PM BY VERONIKA GUIDO MD    Chronic likely due to tobacco abuse. Smoked at least 1 pack a day for last 40 years. No PFTs on file or pulm follow up. On breo-ellipta and albuterol outpatient although patient states he is not taking. Patient also states he is supposed to use o2 3-4L at home.  Does not appear to be acute exacerbation (currently on 2L)    Plan:  Supplemental oxygen at home for comfort  Resume home albuterol on discharge  Continue home Bro-ellipta daily   Outpatient pulm referral for establishment and PFTs

## 2025-05-01 NOTE — ASSESSMENT & PLAN NOTE
Patient admitted to [facility] after cleared trauma eval for suspicion of possibly aspiration PNA. Given ceftriaxone x1 in ED. Patient denies any sick symptoms such as fever,chills increased cough or congestion. Patient is afebrile without leukocytosis. States he has O2 at home. On initial exam: scattered rhonchi throughout all lung fields and mildly diminished air movement no wheezing. Saturating 94 percent on 2L. Low suspicion of Aspiration PNA (although patient is now covered for 24 hours due to lack of above symptoms). Possible aspiration pneumonitis in setting of alcohol abuse versus COPD exacerbation although is not wheezing and appears to be @ baseline respiratory status. Procal (-). Duo nebs ordered while inpatient.  CXR: No acute pathology    Plan:  Ambulatory referral to pulm provided on discharge  Continue to monitor off antibiotics in the outpatient setting  Supplemental oxygen at home for comfort  Resume home albuterol on discharge  Continue home Bro-ellipta daily

## 2025-05-01 NOTE — UTILIZATION REVIEW
Initial Clinical Review    Admission: Date/Time/Statement:   Admission Orders (From admission, onward)       Ordered        04/30/25 2106  INPATIENT ADMISSION  Once                          Orders Placed This Encounter   Procedures    INPATIENT ADMISSION     Standing Status:   Standing     Number of Occurrences:   1     Level of Care:   Med Surg [16]     Estimated length of stay:   More than 2 Midnights     Certification:   I certify that inpatient services are medically necessary for this patient for a duration of greater than two midnights. See H&P and MD Progress Notes for additional information about the patient's course of treatment.     ED Arrival Information       Expected   -    Arrival   4/30/2025 19:31    Acuity   Emergent              Means of arrival   Ambulance    Escorted by   [facility]    Service   Hospitalist    Admission type   Emergency              Arrival complaint   -             Chief Complaint   Patient presents with    Fall     Trauma B. Found laying on floor in apartment. +HS +Eliquis +ETOH. Recent discharge from [facility]       Initial Presentation: 65 y.o. male who presented by EMS to [facility] ED.  Pertinent PMHx: afib (on Eliquis), CAD, COPD, AUD, CKD, HF, T2DM.   Presented after a fall, pt is on Eliquis. Pt refused c-collar on ED arrival. Reports 2-3 glasses of vodka today, stating that is a small amount for him. EXAM: intoxicated, multiple lacerations across all extremities, staples in L glute, oriented to self and hospital, disoriented to situation and time, coarse breath sounds, 88% on room air. WBC 3.54. K 3.4. CXR opacity in RLL. Notably, pt is supposed to be on 3-4L O2 NC at baseline, but is not wearing O2 at home s/t cost. Trauma imaging unremarkable. Admitted as Inpatient for evaluation and treatment of aspiration pneumonia. PLAN: check CK, PT/OT evals, IV ABX, Supplemental O2, weaned as tolerated; nebs, continue PTA meds, CIWA monitoring,  thiamine/folic acid supplement, NRT; Trend labs, replete electrolytes as needed. Wound of R buttock - staples removed, healing well. SSI w/ BG checks ACHS.    Anticipated Length of Stay/Certification Statement: Patient will be admitted on an inpatient basis with an anticipated length of stay of greater than 2 midnights secondary to Fall and SOB.    Date: 05/01/25   Day 2: Abnormal lung sounds initially heard on exam, resolved with DuoNeb treatments.  Patient remained on oxygen during admission, states he has home oxygen.  CM was consulted, patient was seen by RONNIE, states he would like to pursue outpatient rehab for alcohol use on his own.  Provided list of resources.  On day of discharge, patient in stable condition and agreeable to going home.     ED Treatment-Medication Administration from 04/30/2025 1918 to 04/30/2025 2235         Date/Time Order Dose Route Action     04/30/2025 2112 ceftriaxone (ROCEPHIN) 1 g/50 mL in dextrose IVPB 1,000 mg Intravenous New Bag            Scheduled Medications:  aluminum-magnesium hydroxide-simethicone, 30 mL, Oral, 4x Daily (AC & HS)  apixaban, 5 mg, Oral, BID  aspirin, 81 mg, Oral, Daily  atorvastatin, 40 mg, Oral, Daily With Dinner  diltiazem, 120 mg, Oral, BID  ferrous sulfate, 325 mg, Oral, Daily With Breakfast  fluticasone-vilanterol, 1 puff, Inhalation, Daily  folic acid 1 mg, thiamine (VITAMIN B1) 100 mg in sodium chloride 0.9 % 100 mL IV piggyback, , Intravenous, Daily  gabapentin, 300 mg, Oral, TID  insulin lispro, 1-5 Units, Subcutaneous, TID AC  lidocaine, 1 patch, Topical, Daily  metoprolol succinate, 150 mg, Oral, Q12H GURPREET  naltrexone, 50 mg, Oral, Daily  nicotine, 1 patch, Transdermal, Q24H  pantoprazole, 40 mg, Oral, Daily Before Breakfast  pyridoxine, 100 mg, Oral, Daily  ranolazine, 500 mg, Oral, BID  saccharomyces boulardii, 250 mg, Oral, BID  senna-docusate sodium, 1 tablet, Oral, Daily  tamsulosin, 0.4 mg, Oral, Daily With Dinner    Continuous IV Infusions:  none    PRN Meds:  acetaminophen, 975 mg, Oral, Q8H PRN  calcium gluconate, 1 g, Intravenous; 4/30 x1  calcium gluconate, 2 g, Intravenous; 5/1 x1  magnesium sulfate, 2 g, Intravenous; 5/1 x2  potassium chloride, 20 mEq, Oral; 5/1 x1  potassium chloride, 40 mEq, Oral; 4/30 x1    ED Triage Vitals   Temperature Pulse Respirations Blood Pressure SpO2 Pain Score   04/30/25 1931 04/30/25 1931 04/30/25 1931 04/30/25 1931 04/30/25 1931 04/30/25 2253   97.9 °F (36.6 °C) 105 20 136/92 100 % 5     Weight (last 2 days)       Date/Time Weight    04/30/25 2345 76 (167.55)    04/30/25 1931 76 (167.55)            Vital Signs (last 3 days)       Date/Time Temp Pulse Resp BP MAP (mmHg) SpO2 Calculated FIO2 (%) - Nasal Cannula Nasal Cannula O2 Flow Rate (L/min) O2 Device Patient Position - Orthostatic VS [place] Coma Scale Score CIWA-Ar Total Pain    05/01/25 1121 -- -- -- 119/75 -- -- -- -- -- -- -- -- --    05/01/25 11:16:02 98 °F (36.7 °C) 79 18 119/75 90 88 % -- -- -- -- -- 2 --    05/01/25 07:17:43 98.1 °F (36.7 °C) 79 16 91/55 67 93 % -- -- -- -- -- 3 --    05/01/25 03:20:57 -- 84 -- 91/57 68 96 % -- -- -- -- -- 1 --    05/01/25 0315 97.9 °F (36.6 °C) -- 18 88/60 -- -- -- -- -- Lying -- -- --    04/30/25 23:58:58 -- 107 -- 121/79 93 97 % -- -- -- -- -- -- --    04/30/25 2356 -- -- -- 121/79 -- -- -- -- -- -- -- -- --    04/30/25 2319 -- -- -- -- -- -- -- -- -- -- 14 -- --    04/30/25 2305 -- 102 -- 140/80 100 95 % -- -- -- -- -- 6 --    04/30/25 2302 -- 102 -- 140/80 -- -- -- -- -- -- -- -- --    04/30/25 2253 -- -- -- -- -- -- -- -- -- -- -- -- 5    04/30/25 22:45:31 97.4 °F (36.3 °C) 100 -- 130/83 99 94 % -- -- -- -- -- -- --    04/30/25 2200 -- 105 -- 100/55 70 95 % 28 2 L/min Nasal cannula -- -- -- --    04/30/25 2100 -- 96 -- 112/75 -- 95 % -- -- Nasal cannula  -- 15 -- --    04/30/25 20:53:33 -- 111 -- 101/67 -- 89 % -- -- None (Room air)  -- -- -- --    04/30/25 2030 -- 110 -- 127/78 -- 98 % -- -- -- -- 15 -- --     04/30/25 2015 -- 103 18 122/65 -- 98 % -- -- Nasal cannula -- 15 -- --    04/30/25 2000 -- 106 16 120/81 -- 93 % -- -- Nasal cannula Lying 15 -- --    04/30/25 1942 -- 105 18 119/75 -- 99 % -- -- Nasal cannula Lying 14 -- --    04/30/25 1931 97.9 °F (36.6 °C) 105 20 136/92 -- 100 % -- -- Nasal cannula Lying 14 -- --           CIWA-Ar Score       Row Name 05/01/25 11:16:02 05/01/25 07:17:43 05/01/25 03:20:57       CIWA-Ar    Nausea and Vomiting 0 0 0    Tactile Disturbances 0 0 0    Tremor 1 1 0    Auditory Disturbances 0 0 0    Paroxysmal Sweats 0 0 0    Visual Disturbances 0 0 0    Anxiety 0 1 0    Headache, Fullness in Head 1 1 0    Agitation 0 0 0    Orientation and Clouding of Sensorium 0 0 1    CIWA-Ar Total 2 3 1      Row Name 04/30/25 2305             CIWA-Ar    Nausea and Vomiting 0      Tactile Disturbances 0      Tremor 4      Auditory Disturbances 0      Paroxysmal Sweats 0      Visual Disturbances 0      Anxiety 1      Headache, Fullness in Head 0      Agitation 0      Orientation and Clouding of Sensorium 1      CIWA-Ar Total 6                      Pertinent Labs/Diagnostic Test Results:   Radiology:  TRAUMA - CT head wo contrast   Final Interpretation by E. Alec Schoenberger, MD (04/30 2024)      No acute intracranial abnormality.      I personally discussed this study with KENJI ROCHA on 4/30/2025 8:16 PM.               Workstation performed: [id number]         TRAUMA - CT spine cervical wo contrast   Final Interpretation by E. Alec Schoenberger, MD (04/30 2020)      No cervical spine fracture or traumatic malalignment.   Stable postoperative changes            I personally discussed this study with KENJI ROCHA on 4/30/2025 8:16 PM.                  Workstation performed: [id number]         XR Trauma multiple (SLB/SLRA trauma bay ONLY)   Final Interpretation by E. Alec Schoenberger, MD (04/30 2024)      No acute cardiopulmonary disease within limitations of supine imaging.                Computerized Assisted Algorithm (CAA) may have been used to analyze all applicable images.            Workstation performed: [id number]         XR chest 1 view   Final Interpretation by E. Alec Schoenberger, MD (04/30 2024)      No acute cardiopulmonary disease within limitations of supine imaging.               Computerized Assisted Algorithm (CAA) may have been used to analyze all applicable images.            Workstation performed: [id number]           Cardiology:  ECG 12 lead   Final Result by Brenda Melgar MD (05/01 0922)   Atrial fibrillation with rapid ventricular response   Rightward axis   Nonspecific ST abnormality   Abnormal QRS-T angle, consider primary T wave abnormality   Abnormal ECG   When compared with ECG of 30-Apr-2025 19:40, (unconfirmed)   No significant change was found   Confirmed by Brenda Melagr ([id number]) on 5/1/2025 9:22:07 AM      ECG 12 lead   Final Result by Brenda Melgar MD (05/01 0922)   Atrial fibrillation with rapid ventricular response   Nonspecific ST abnormality   Abnormal ECG   No previous ECGs available   Confirmed by Brenda Melgar ([id number]) on 5/1/2025 9:22:09 AM            Results from last 7 days   Lab Units 05/01/25  0321 05/01/25  0006 04/30/25 1944 04/30/25 1940   WBC Thousand/uL 3.51*  --   --  3.54*   HEMOGLOBIN g/dL 10.9*  --   --  12.1   I STAT HEMOGLOBIN g/dl  --   --  13.3  --    HEMATOCRIT % 33.6*  --   --  36.7   HEMATOCRIT, ISTAT %  --   --  39  --    PLATELETS Thousands/uL 83* 85*  --  94*   TOTAL NEUT ABS Thousands/µL 1.67*  --   --   --    BANDS PCT %  --   --   --  1         Results from last 7 days   Lab Units 05/01/25 0321 04/30/25 1944 04/30/25 1940   SODIUM mmol/L 140  --  139   POTASSIUM mmol/L 3.6  --  3.4*   CHLORIDE mmol/L 101  --  98   CO2 mmol/L 26  --  27   CO2, I-STAT mmol/L  --  28  --    ANION GAP mmol/L 13  --  14*   BUN mg/dL 11  --  10   CREATININE mg/dL 1.15  --  1.12   EGFR ml/min/1.73sq m 67  --  69   CALCIUM mg/dL  7.2*  --  7.2*   CALCIUM, IONIZED mmol/L 0.97*  --   --    CALCIUM, IONIZED, ISTAT mmol/L  --  0.87*  --    MAGNESIUM mg/dL 2.3  --  1.3*     Results from last 7 days   Lab Units 05/01/25 0321 04/30/25 1940   AST U/L 63* 78*   ALT U/L 21 26   ALK PHOS U/L 152* 170*   TOTAL PROTEIN g/dL 6.2* 7.1   ALBUMIN g/dL 3.0* 3.5   TOTAL BILIRUBIN mg/dL 0.52 0.73     Results from last 7 days   Lab Units 05/01/25  1101 05/01/25  0715   POC GLUCOSE mg/dl 130 85     Results from last 7 days   Lab Units 05/01/25 0321 04/30/25 1940   GLUCOSE RANDOM mg/dL 83 76      Results from last 7 days   Lab Units 04/30/25 1944   PH, LARIAS I-STAT  7.406*   PCO2, LARISA ISTAT mm HG 42.4   PO2, LARISA ISTAT mm HG 41.0   HCO3, LARISA ISTAT mmol/L 26.7   I STAT BASE EXC mmol/L 2   I STAT O2 SAT % 77     Results from last 7 days   Lab Units 04/30/25 1940   CK TOTAL U/L 172      Results from last 7 days   Lab Units 05/01/25 0321 04/30/25 1940   PROCALCITONIN ng/ml 0.09 0.08      Results from last 7 days   Lab Units 05/01/25  0006   ETHANOL LVL mg/dL 349*       Admitting Diagnosis: Aspiration pneumonia (HCC) [J69.0]  Multiple injuries due to trauma [T07.XXXA]  Alcohol use disorder [F10.90]  Age/Sex: 65 y.o. male    Network Utilization Review Department  ATTENTION: Please call with any questions or concerns to [phone] and carefully listen to the prompts so that you are directed to the right person. All voicemails are confidential.   For Discharge needs, contact Care Management DC Support Team at [phone] opt. 2  Send all requests for admission clinical reviews, approved or denied determinations and any other requests to dedicated fax number below belonging to the campus where the patient is receiving treatment. List of dedicated fax numbers for the Facilities:  FACILITY NAME UR FAX NUMBER   ADMISSION DENIALS (Administrative/Medical Necessity) [phone]   DISCHARGE SUPPORT TEAM (NETWORK) [phone]   PARENT CHILD HEALTH  (Maternity/NICU/Pediatrics) [phone]   [facility] [phone]   General acute hospital [phone]   [facility] [phone]   [facility] [phone]   [facility] [phone]   [facility] [phone]   [facility] [phone]   [facility] [phone]   [facility] [phone]   [facility] [phone]   [facility] [phone]   [facility] [phone]

## 2025-05-01 NOTE — ASSESSMENT & PLAN NOTE
>>ASSESSMENT AND PLAN FOR CAD (CORONARY ARTERY DISEASE) WRITTEN ON 4/30/2025 11:18 PM BY AKIL LOVE,     Patient has a h/o CAD s/p CABG. [id number]  Denies any chest pain or cardiac symptoms   Continue home Aspirin 81mg QD  Continue home Ranolazine 500 mg BID  Continue Lipitor 40 mg daily

## 2025-05-01 NOTE — ASSESSMENT & PLAN NOTE
Wt Readings from Last 3 Encounters:   04/30/25 76 kg (167 lb 8.8 oz)   Euvolemic on exam   Most recent echo feb 2025 (follows with [facility])   Left Ventricle: Left ventricle is normal in size. There is mild   concentric hypertrophy. Systolic function is low normal with an ejection   fraction of 50-54%. Wall motion cannot be accurately assessed.   Indeterminate diastolic function.     Right Ventricle: Right ventricle cavity is normal. Systolic function is   reduced. Abnormal tricuspid annular plane systolic excursion (TAPSE) <1.7   cm.    Left Atrium: Left atrium cavity is severely dilated.     Mitral Valve: There is mild to moderate regurgitation.     Tricuspid Valve: There is moderate regurgitation.     Pulmonic Valve: There is trace regurgitation. The estimated pulmonary   artery systolic pressure is 63.9 mmHg.

## 2025-05-01 NOTE — DISCHARGE INSTR - AVS FIRST PAGE
Dear Srinivasa Walters,     It was our pleasure to care for you here at [facility].  It is our hope that we were always able to exceed the expected standards for your care during your stay.  You were hospitalized due to fall.  You were cared for on the MedSurg floor by Meggan Arroyo MD under the service of Vishal Mijares MD with the [facility] Internal Medicine Hospitalist Group who covers for your primary care physician (PCP), No primary care provider on file., while you were hospitalized.  If you have any questions or concerns related to this hospitalization, you may contact us at .  For follow up as well as any medication refills, we recommend that you follow up with your primary care physician.  A registered nurse will reach out to you by phone within a few days after your discharge to answer any additional questions that you may have after going home.  However, at this time we provide for you here, the most important instructions / recommendations at discharge:     Notable Medication Adjustments -   None  Testing Required after Discharge -   None  Important follow up information -   Please follow-up with your PCP within 1 week after discharge  Please partake in an outpatient rehab program for alcohol use  Please review this entire after visit summary as additional general instructions including medication list, appointments, activity, diet, any pertinent wound care, and other additional recommendations from your care team that may be provided for you.      Sincerely,     Meggan Arroyo MD

## 2025-05-01 NOTE — CASE MANAGEMENT
Case Management Discharge Planning Note    Patient name Srinivasa Walters  Location W /W -01 MRN [id number]  : 1960 Date 2025       Current Admission Date: 2025  Current Admission Diagnosis:Fall   Patient Active Problem List    Diagnosis Date Noted Date Diagnosed    Fall 2025     Aspiration pneumonia of both lower lobes ([facility]) 2025     Alcohol use disorder 2025     CAD (coronary artery disease) 2025     A-fib ([facility]) 2025     COPD (chronic obstructive pulmonary disease) ([facility]) 2025     Tobacco abuse 2025     CKD (chronic kidney disease) stage 2, GFR 60-89 ml/min 2025     Heart failure with preserved ejection fraction ([facility]) 2025     SOB (shortness of breath) 2025     Wound of right buttock 2025     Type 2 diabetes mellitus, without long-term current use of insulin ([facility]) 2025       LOS (days): 1  Geometric Mean LOS (GMLOS) (days): 2.5  Days to GMLOS:1.9     OBJECTIVE:  Risk of Unplanned Readmission Score: 16.53         Current admission status: Inpatient   Preferred Pharmacy:   UNKNOWN - FOLLOW UP PRIOR TO DISCHARGE TO E-PRESCRIBE  No address on file      Primary Care Provider: No primary care provider on file.    Primary Insurance: [employer] [facility] REP  Secondary Insurance:     DISCHARGE DETAILS:     CM received consult for ARGELAI/OUD. CM contacted [facility]  to refer patient and provided minimal necessary information. CRS to meet with patient and follow up with CM to provide update on plan of care following patient connection.    Per CRS, pt does not need assistance as he has information related to an IOP that he must complete as he recently got a DUI.  He will be following up with a program upon DC.

## 2025-05-01 NOTE — ASSESSMENT & PLAN NOTE
Patient admitted to [facility] after cleared trauma eval for suspicion of possibly aspiration PNA.   Given ceftriaxone x1 in ED.   Patient denies any sick symptoms such as fever,chills increased cough or congestion  Patient is afebrile without leukocytosis  He is supposed to be on 3-4L o2 @ home at baseline although he is not wearing o2 at home due to cost    CXR: no acute pathology    Lung exam: scattered rhonchi throughout all lung fields and mildly diminished air movement no wheezing. Saturating 94 percent on 2L.    Low suspicion of Aspiration PNA (although patient is now covered for 24 hours due to lack of above symptoms). Possible aspiration pneumonitis in setting of alcohol abuse versus COPD exacerbation although is not wheezing and appears to be @ baseline respiratory status       Plan  -Monitor off ABX for now  -check procal (with lab collection prior to abx) and in AM  -Low threshold to resume if infectious signs develop or respiratory status worsens(although would do ampicillin-sulbactam for coverage of aspiration PNA q6)   -supplemental o2 for comfort  -duo nebs ordered while inpatient (can resume home albuterol at [place])  -continue home Bro-ellipta daily   -needs outpatient pulm referral as he has no pfts on file

## 2025-05-01 NOTE — ASSESSMENT & PLAN NOTE
Smoked at least 1.5 to 2 pack a day for last 40 years     Nicotine patch 21mg ordered   Counseling provided patient in pre-contemplative stage of change

## 2025-05-01 NOTE — ASSESSMENT & PLAN NOTE
Multiple ED visits for OH intoxification and falls. Per ED notes intoxicated on arrival. Drinks 1 bottle of vodka daily. No symptoms of withdraw at time of eval. Patient was given folic acid 1mg and thiamine prophylaxis 100mg IV x3 doses.  Placed on CIWA protocol.    Plan:  Case management consult  CATCH saw pt, provided outpt rehab information  States he would like to pursue rehab on his own

## 2025-05-01 NOTE — ASSESSMENT & PLAN NOTE
>>ASSESSMENT AND PLAN FOR CAD (CORONARY ARTERY DISEASE) WRITTEN ON 5/1/2025  3:48 PM BY VERONIKA GUIDO MD    Patient has a h/o CAD s/p CABG x 3 2024. Denies any chest pain or cardiac symptoms     Plan:  Continue home Aspirin 81mg QD  Continue home Ranolazine 500 mg BID  Continue Lipitor 40 mg daily

## 2025-05-01 NOTE — ASSESSMENT & PLAN NOTE
>>ASSESSMENT AND PLAN FOR TOBACCO ABUSE WRITTEN ON 4/30/2025 11:18 PM BY AKIL LOVE, DO    Smoked at least 1.5 to 2 pack a day for last 40 years     Nicotine patch 21mg ordered   Counseling provided patient in pre-contemplative stage of change

## 2025-05-01 NOTE — QUICK NOTE
Patient had staples in his right glutes with an underlying well-healed incision.  When asked the patient states they have been in for 2 to 3 weeks.  He states that he did not know they had to come out.  Staples removed, slight bleeding from 1 staple site.  That site was flushed.  Wound seems well healed.

## 2025-05-01 NOTE — H&P
H&P - Hospitalist   Name: Srinivasa Walters 65 y.o. male I MRN: [id number]  Unit/Bed#: W -01 I Date of Admission: 4/30/2025   Date of Service: 4/30/2025 I Hospital Day: 0     Assessment & Plan  Fall  Admission for trauma level B post fall at home on Asprin and eliquis   Mechanical in setting of OH intoxification  Ambulatory dysfunction at baseline   Found down at side of his bed and friend called EMS  Patient denies loss of consciousness or syncope    CT head and CT c-spine- no acute pathology (stable post op fusion noted c2-T2)  Fast exam and trauma x rays negative for acute fractures or pathology     Patient denying any pain at this time    Plan  Cleared trauma  With unknown downtime will check ck  PT/OT eval  Case management consult for high utilize plan    SOB (shortness of breath)  Patient admitted to [facility] after cleared trauma eval for suspicion of possibly aspiration PNA.   Given ceftriaxone x1 in ED.   Patient denies any sick symptoms such as fever,chills increased cough or congestion  Patient is afebrile without leukocytosis  He is supposed to be on 3-4L o2 @ home at baseline although he is not wearing o2 at home due to cost    CXR: no acute pathology    Lung exam: scattered rhonchi throughout all lung fields and mildly diminished air movement no wheezing. Saturating 94 percent on 2L.    Low suspicion of Aspiration PNA (although patient is now covered for 24 hours due to lack of above symptoms). Possible aspiration pneumonitis in setting of alcohol abuse versus COPD exacerbation although is not wheezing and appears to be @ baseline respiratory status       Plan  -Monitor off ABX for now  -check procal (with lab collection prior to abx) and in AM  -Low threshold to resume if infectious signs develop or respiratory status worsens(although would do ampicillin-sulbactam for coverage of aspiration PNA q6)   -supplemental o2 for comfort  -duo nebs ordered while inpatient (can resume home albuterol at  dc)  -continue home Bro-ellipta daily   -needs outpatient pulm referral as he has no pfts on file   Alcohol use disorder  Multiple ED visits for OH intoxification and falls  Per ED notes intoxicated on arrival   Drinks 1 bottle of votka daily  No symptoms of withdraw at time of eval    Plan  CIWA protocol  Give folic acid 1mg and thiamine prophylaxis 100mg IV x3 doses   Fall precautions  Case management consult for high utilizer plan    CAD (coronary artery disease)  Patient has a h/o CAD s/p CABG. X3 [id number]  Denies any chest pain or cardiac symptoms   Continue home Aspirin 81mg QD  Continue home Ranolazine 500 mg BID  Continue Lipitor 40 mg daily  A-fib (HCC)  Chronic   No new symptoms or syncope  Patient is not compliant with meds     Plan  Continue eliquis 5mg  Continue cardizem 12hr 120mg BID  Continue metoprolol succinate 150mg BID  COPD (chronic obstructive pulmonary disease) (HCC)  Chronic likely due to tobacco abuse  Smoked at least 1 pack a day for last 40 years   No PFTs on file or pulm follow up    On breo-ellipta and albuterol outpatient although patient states he is not taking  Patient also states he is supposed to use o2 3-4L at home but says he can't afford it    Does not appear to be acute exacerbation (currently on 2L)    Plan  Continue breo  Substitute duo nebs for albuterol w/ respiratory protocol while inpatient admission  Outpatient pulm referral for establishment and PFTs  Tobacco abuse  Smoked at least 1.5 to 2 pack a day for last 40 years     Nicotine patch 21mg ordered   Counseling provided patient in pre-contemplative stage of change   CKD (chronic kidney disease) stage 2, GFR 60-89 ml/min  Lab Results   Component Value Date    EGFR 69 04/30/2025    CREATININE 1.12 04/30/2025     @ baseline   Trend renal function daily  Check CK in setting of fall  Heart failure with preserved ejection fraction (HCC)  Wt Readings from Last 3 Encounters:   04/30/25 76 kg (167 lb 8.8 oz)   Euvolemic on exam  "  Most recent echo feb 2025 (follows with [facility])   Left Ventricle: Left ventricle is normal in size. There is mild   concentric hypertrophy. Systolic function is low normal with an ejection   fraction of 50-54%. Wall motion cannot be accurately assessed.   Indeterminate diastolic function.     Right Ventricle: Right ventricle cavity is normal. Systolic function is   reduced. Abnormal tricuspid annular plane systolic excursion (TAPSE) <1.7   cm.    Left Atrium: Left atrium cavity is severely dilated.     Mitral Valve: There is mild to moderate regurgitation.     Tricuspid Valve: There is moderate regurgitation.     Pulmonic Valve: There is trace regurgitation. The estimated pulmonary   artery systolic pressure is 63.9 mmHg.       Wound of right buttock      Open wound on right buttock, approximately 2 cm deep. On 4/14. Closed by general surg after prior fall  Staples removed healing well in image above      Continue to monitor and provide local wound care   Type 2 diabetes mellitus, without long-term current use of insulin (HCC)  No results found for: \"HGBA1C\"    No results for input(s): \"POCGLU\" in the last 72 hours.    Blood Sugar Average: Last 72 hrs:      Last A1c 5.4 two months ago  Metformin 500mg outpatient  Alg 2 sliding scale while inpatient     VTE Pharmacologic Prophylaxis: VTE Score: 5 High Risk (Score >/= 5) - Pharmacological DVT Prophylaxis Ordered: apixaban (Eliquis). Sequential Compression Devices Ordered.  Code Status: Level 1 - Full Code   Discussion with family: Patient declined call to .     Anticipated Length of Stay: Patient will be admitted on an inpatient basis with an anticipated length of stay of greater than 2 midnights secondary to Fall and SOB w/ high utilizer plan .    History of Present Illness   Chief Complaint: Gabriel Walters is a 65 y.o. male with a PMH of CAD, afib, COPD, OH use disorder, HfrEF, stage 2 CKD,  who presented as a trauma level B after a fall at " home in his bedroom. A friend found him down on the ground. Patient denies any syncope or symptoms prior to fall and states he just lost his footing. He is on Asprin and eliquis although reports he has not taking any medication since last hospital discharge approx 2 weeks ago. He states he has been continuously drinking 1 bottle of vodka daily. He denies any URI symptoms, increase in cough( he states he always has a cough), no sputum production, fever or chills. He denies any issues with urination or bowel movements.     In ED, patient was admitted under trauma workup and imaging was negitive for acute pathology. Trauma reached out for admission as they were suspecting aspiration PNA due to diminished breath sounds in bilateral lower lung fields and O2 requirement in setting of OH abuse.  Patient was never meeting sirs criteria.  He was given 1 dose of ceftriaxone and transferred to [facility] for monitoring.    Review of Systems   Constitutional:  Negative for fatigue.   HENT:  Negative for rhinorrhea, sinus pressure, sneezing and sore throat.    Respiratory:  Positive for cough and shortness of breath. Negative for chest tightness and wheezing.    Cardiovascular:  Negative for chest pain and palpitations.   Gastrointestinal:  Negative for abdominal pain, blood in stool, diarrhea, nausea and vomiting.   Genitourinary:  Negative for flank pain.   Neurological:  Negative for dizziness, syncope, numbness and headaches.   Psychiatric/Behavioral:  Negative for sleep disturbance and suicidal ideas. The patient is not nervous/anxious.        Historical Information   History reviewed. No pertinent past medical history.  History reviewed. No pertinent surgical history.  Social History     Tobacco Use    Smoking status: Every Day     Current packs/day: 2.00     Average packs/day: 2.0 packs/day for 40.0 years (80.0 ttl pk-yrs)     Types: Cigarettes     Start date: 4/30/1985    Smokeless tobacco: Never   Substance and Sexual  "Activity    Alcohol use: Yes     Comment: \"quart a day\"    Drug use: Not on file    Sexual activity: Not on file     E-Cigarette/Vaping     E-Cigarette/Vaping Substances     Family history non-contributory  Social History:  Marital Status: Single   Occupation: retired   Patient Pre-hospital Living Situation: Home  Patient Pre-hospital Level of Mobility: walks with walker although he wont use in apartment   Patient Pre-hospital Diet Restrictions: none     Meds/Allergies   I have reviewed home medications using recent Epic encounter.  Prior to Admission medications    Medication Sig Start Date End Date Taking? Authorizing Provider   albuterol (PROVENTIL HFA,VENTOLIN HFA) 90 mcg/act inhaler Inhale 2 puffs every 4 (four) hours as needed for wheezing   Yes Historical Provider, MD   aluminum-magnesium hydroxide-simethicone (MAALOX) 200-200-20 MG/5ML SUSP Take 30 mL by mouth 4 (four) times a day (before meals and at bedtime)   Yes Historical Provider, MD   apixaban (ELIQUIS) 5 mg Take 5 mg by mouth 2 (two) times a day   Yes Historical Provider, MD   aspirin 81 mg chewable tablet Chew 81 mg daily   Yes Historical Provider, MD   atorvastatin (LIPITOR) 40 mg tablet Take 40 mg by mouth daily   Yes Historical Provider, MD   diltiazem (CARDIZEM SR) 120 mg 12 hr capsule Take 120 mg by mouth 2 (two) times a day   Yes Historical Provider, MD   ferrous sulfate 325 (65 Fe) mg tablet Take 325 mg by mouth daily with breakfast   Yes Historical Provider, MD   fluticasone-vilanterol (Breo Ellipta) 200-25 mcg/actuation inhaler Inhale 1 puff daily Rinse mouth after use.   Yes Historical Provider, MD   folic acid (FOLVITE) 1 mg tablet Take by mouth daily   Yes Historical Provider, MD   gabapentin (NEURONTIN) 300 mg capsule Take 300 mg by mouth 3 (three) times a day   Yes Historical Provider, MD   lidocaine (LIDODERM) 5 % Apply 1 patch topically daily Remove & Discard patch within 12 hours or as directed by MD   Yes Historical Provider, MD "   magnesium oxide-pyridoxine (BEELITH) 362-20 MG TABS Take 1 tablet by mouth daily   Yes Historical Provider, MD   metFORMIN (GLUCOPHAGE) 500 mg tablet Take 500 mg by mouth daily with breakfast   Yes Historical Provider, MD   metoprolol succinate (TOPROL-XL) 100 mg 24 hr tablet Take 150 mg by mouth every 12 (twelve) hours   Yes Historical Provider, MD   naltrexone (REVIA) 50 mg tablet Take 50 mg by mouth daily   Yes Historical Provider, MD   nicotine (NICODERM CQ) 21 mg/24 hr TD 24 hr patch Place 1 patch on the skin every 24 hours   Yes Historical Provider, MD   pantoprazole (PROTONIX) 40 mg tablet Take 40 mg by mouth daily   Yes Historical Provider, MD   pyridoxine (VITAMIN B6) 100 mg tablet Take 100 mg by mouth daily   Yes Historical Provider, MD   ranolazine (RANEXA) 500 mg 12 hr tablet Take 500 mg by mouth 2 (two) times a day   Yes Historical Provider, MD   saccharomyces boulardii (FLORASTOR) 250 mg capsule Take 250 mg by mouth 2 (two) times a day   Yes Historical Provider, MD   senna-docusate sodium (SENOKOT-S) 8.6-50 mg per tablet Take 1 tablet by mouth daily   Yes Historical Provider, MD   tamsulosin (FLOMAX) 0.4 mg Take 0.4 mg by mouth daily with dinner   Yes Historical Provider, MD     No Known Allergies    Objective :  Temp:  [97.4 °F (36.3 °C)-97.9 °F (36.6 °C)] 97.4 °F (36.3 °C)  HR:  [] 102  BP: (100-140)/(55-92) 140/80  Resp:  [16-20] 18  SpO2:  [89 %-100 %] 95 %  O2 Device: Nasal cannula  Nasal Cannula O2 Flow Rate (L/min):  [2 L/min] 2 L/min    Physical Exam  Constitutional:       General: He is not in acute distress.     Appearance: He is not ill-appearing or toxic-appearing.   HENT:      Head: Normocephalic and atraumatic.      Right Ear: External ear normal.      Left Ear: External ear normal.      Nose: No congestion or rhinorrhea.      Mouth/Throat:      Pharynx: No oropharyngeal exudate or posterior oropharyngeal erythema.   Eyes:      Conjunctiva/sclera: Conjunctivae normal.    Cardiovascular:      Rate and Rhythm: Normal rate. Rhythm irregular.      Heart sounds: No murmur heard.     No friction rub. No gallop.   Pulmonary:      Effort: Pulmonary effort is normal. No respiratory distress.      Breath sounds: Rhonchi (bilaterally) present. No wheezing or rales.      Comments: Saturating 94 percent on 2L  Abdominal:      General: Bowel sounds are normal. There is no distension.      Palpations: Abdomen is soft.      Tenderness: There is no abdominal tenderness. There is no right CVA tenderness, left CVA tenderness or guarding.   Skin:     General: Skin is warm.      Comments: See right buttok wound below      Neurological:      General: No focal deficit present.      Mental Status: He is alert and oriented to person, place, and time. Mental status is at baseline.      GCS: GCS eye subscore is 4. GCS verbal subscore is 5. GCS motor subscore is 6.      Cranial Nerves: No cranial nerve deficit.      Sensory: Sensation is intact.      Motor: No weakness, tremor or pronator drift.      Coordination: Coordination is intact.   Psychiatric:         Mood and Affect: Mood normal.         Behavior: Behavior normal.                    Lab Results: I have reviewed the following results:  Results from last 7 days   Lab Units 04/30/25 1944 04/30/25 1940   WBC Thousand/uL  --  3.54*   HEMOGLOBIN g/dL  --  12.1   I STAT HEMOGLOBIN g/dl 13.3  --    HEMATOCRIT %  --  36.7   HEMATOCRIT, ISTAT % 39  --    PLATELETS Thousands/uL  --  94*   BANDS PCT %  --  1   LYMPHO PCT %  --  39   MONO PCT %  --  4   EOS PCT %  --  4     Results from last 7 days   Lab Units 04/30/25 1944 04/30/25 1940   SODIUM mmol/L  --  139   POTASSIUM mmol/L  --  3.4*   CHLORIDE mmol/L  --  98   CO2 mmol/L  --  27   CO2, I-STAT mmol/L 28  --    BUN mg/dL  --  10   CREATININE mg/dL  --  1.12   ANION GAP mmol/L  --  14*   CALCIUM mg/dL  --  7.2*   ALBUMIN g/dL  --  3.5   TOTAL BILIRUBIN mg/dL  --  0.73   ALK PHOS U/L  --  170*   ALT U/L  " --  26   AST U/L  --  78*   GLUCOSE RANDOM mg/dL  --  76             No results found for: \"HGBA1C\"        Imaging Results Review: I personally reviewed the following image studies/reports in PACS and discussed pertinent findings with Radiology: chest xray. My interpretation of the radiology images/reports is: no acute cardio pulm disease .  Other Study Results Review: EKG was reviewed.     Administrative Statements   I have spent a total time of 60 minutes in caring for this patient on the day of the visit/encounter including Diagnostic results, Prognosis, Risks and benefits of tx options, Instructions for management, Patient and family education, and Importance of tx compliance.    Fernando Savage, DO  PGY-2 Family Medicine     ** Please Note: This note has been constructed using a voice recognition system. **    "

## 2025-05-01 NOTE — PLAN OF CARE
Problem: PAIN - ADULT  Goal: Verbalizes/displays adequate comfort level or baseline comfort level  Description: Interventions:- Encourage patient to monitor pain and request assistance- Assess pain using appropriate pain scale- Administer analgesics based on type and severity of pain and evaluate response- Implement non-pharmacological measures as appropriate and evaluate response- Consider cultural and social influences on pain and pain management- Notify physician/advanced practitioner if interventions unsuccessful or patient reports new pain  Outcome: Progressing     Problem: INFECTION - ADULT  Goal: Absence or prevention of progression during hospitalization  Description: INTERVENTIONS:- Assess and monitor for signs and symptoms of infection- Monitor lab/diagnostic results- Monitor all insertion sites, i.e. indwelling lines, tubes, and drains- Monitor endotracheal if appropriate and nasal secretions for changes in amount and color- [place] appropriate cooling/warming therapies per order- Administer medications as ordered- Instruct and encourage patient and family to use good hand hygiene technique- Identify and instruct in appropriate isolation precautions for identified infection/condition  Outcome: Progressing  Goal: Absence of fever/infection during neutropenic period  Description: INTERVENTIONS:- Monitor WBC  Outcome: Progressing     Problem: DISCHARGE PLANNING  Goal: Discharge to home or other facility with appropriate resources  Description: INTERVENTIONS:- Identify barriers to discharge w/patient and caregiver- Arrange for needed discharge resources and transportation as appropriate- Identify discharge learning needs (meds, wound care, etc.)- Arrange for interpretive services to assist at discharge as needed- Refer to Case Management Department for coordinating discharge planning if the patient needs post-hospital services based on physician/advanced practitioner order or complex needs related to  functional status, cognitive ability, or social support system  Outcome: Progressing

## 2025-05-01 NOTE — ASSESSMENT & PLAN NOTE
Chronic likely due to tobacco abuse. Smoked at least 1 pack a day for last 40 years. No PFTs on file or pulm follow up. On breo-ellipta and albuterol outpatient although patient states he is not taking. Patient also states he is supposed to use o2 3-4L at home.  Does not appear to be acute exacerbation (currently on 2L)    Plan:  Supplemental oxygen at home for comfort  Resume home albuterol on discharge  Continue home Bro-ellipta daily   Outpatient pulm referral for establishment and PFTs

## 2025-05-01 NOTE — ASSESSMENT & PLAN NOTE
Lab Results   Component Value Date    EGFR 69 04/30/2025    CREATININE 1.12 04/30/2025     @ baseline   Trend renal function daily  Check CK in setting of fall

## 2025-05-01 NOTE — ASSESSMENT & PLAN NOTE
Chronic likely due to tobacco abuse  Smoked at least 1 pack a day for last 40 years   No PFTs on file or pulm follow up    On breo-ellipta and albuterol outpatient although patient states he is not taking  Patient also states he is supposed to use o2 3-4L at home but says he can't afford it    Does not appear to be acute exacerbation (currently on 2L)    Plan  Continue breo  Substitute duo nebs for albuterol w/ respiratory protocol while inpatient admission  Outpatient pulm referral for establishment and PFTs

## 2025-05-01 NOTE — ASSESSMENT & PLAN NOTE
>>ASSESSMENT AND PLAN FOR A-FIB (HCC) WRITTEN ON 4/30/2025 11:18 PM BY AKIL LOVE, DO    Chronic   No new symptoms or syncope  Patient is not compliant with meds     Plan  Continue eliquis 5mg  Continue cardizem 12hr 120mg BID  Continue metoprolol succinate 150mg BID

## 2025-05-01 NOTE — ASSESSMENT & PLAN NOTE
Lab Results   Component Value Date    EGFR 67 05/01/2025    EGFR 69 04/30/2025    CREATININE 1.15 05/01/2025    CREATININE 1.12 04/30/2025   @ baseline

## 2025-05-01 NOTE — ASSESSMENT & PLAN NOTE
>>ASSESSMENT AND PLAN FOR A-FIB (HCC) WRITTEN ON 5/1/2025  3:48 PM BY VERONIKA GUIDO MD    Chronic. No new symptoms or syncope. Patient is not compliant with meds.    Plan:  Continue eliquis 5mg  Continue cardizem 12hr 120mg BID  Continue metoprolol succinate 150mg BID

## 2025-05-01 NOTE — ASSESSMENT & PLAN NOTE
>>ASSESSMENT AND PLAN FOR COPD (CHRONIC OBSTRUCTIVE PULMONARY DISEASE) ([facility]) WRITTEN ON 6/5/2025 10:43 PM BY DAVIDSON WEINSTEIN MD     >>ASSESSMENT AND PLAN FOR SOB (SHORTNESS OF BREATH) WRITTEN ON 4/30/2025 11:38 PM BY AKIL LOVE,     Patient admitted to [facility] after cleared trauma eval for suspicion of possibly aspiration PNA.   Given ceftriaxone x1 in ED.   Patient denies any sick symptoms such as fever,chills increased cough or congestion  Patient is afebrile without leukocytosis  He is supposed to be on 3-4L o2 @ home at baseline although he is not wearing o2 at home due to cost    CXR: no acute pathology    Lung exam: scattered rhonchi throughout all lung fields and mildly diminished air movement no wheezing. Saturating 94 percent on 2L.    Low suspicion of Aspiration PNA (although patient is now covered for 24 hours due to lack of above symptoms). Possible aspiration pneumonitis in setting of alcohol abuse versus COPD exacerbation although is not wheezing and appears to be @ baseline respiratory status       Plan  -Monitor off ABX for now  -check procal (with lab collection prior to abx) and in AM  -Low threshold to resume if infectious signs develop or respiratory status worsens(although would do ampicillin-sulbactam for coverage of aspiration PNA q6)   -supplemental o2 for comfort  -duo nebs ordered while inpatient ([facility])  -continue home Bro-ellipta daily   -needs outpatient pulm referral as he has no pfts on file      >>ASSESSMENT AND PLAN FOR COPD (CHRONIC OBSTRUCTIVE PULMONARY DISEASE) ([facility]) WRITTEN ON 4/30/2025 11:18 PM BY AKIL LOVE,     Chronic likely due to tobacco abuse  Smoked at least 1 pack a day for last 40 years   No PFTs on file or pulm follow up    On breo-ellipta and albuterol outpatient although patient states he is not taking  Patient also states he is supposed to use o2 3-4L at home but says he can't afford it    Does not appear to be acute  exacerbation (currently on 2L)    Plan  Continue breo  Substitute duo nebs for albuterol w/ respiratory protocol while inpatient admission  Outpatient pulm referral for establishment and PFTs

## 2025-05-01 NOTE — ASSESSMENT & PLAN NOTE
- Status post fall with the below noted injuries.  - Fall precautions.  - No traumatic injuries on workup

## 2025-05-01 NOTE — DISCHARGE SUMMARY
Discharge Summary - Hospitalist   Name: Srinivasa Walters 65 y.o. male I MRN: [id number]  Unit/Bed#: W -01 I Date of Admission: 4/30/2025   Date of Service: 5/1/2025 I Hospital Day: 1     Assessment & Plan  Fall  Admission for trauma level B post fall at home on Asprin and eliquis. Mechanical in setting of OH intoxification, reported drinking bottle of vodka on admission. Ambulatory dysfunction at baseline. Found down at side of his bed and friend called EMS. Patient denies loss of consciousness or syncope. Patient denying any pain at this time. Patient denying any pain at this time.  CT head & CT c-spine: no acute pathology (stable post op fusion noted c2-T2)  Fast exam & trauma x rays: negative for acute fractures or pathology     Plan:  Cleared trauma  Case management consult  CATCH saw pt, provided outpt rehab information  States he would like to pursue rehab on his own  SOB (shortness of breath)  Patient admitted to [facility] after cleared trauma eval for suspicion of possibly aspiration PNA. Given ceftriaxone x1 in ED. Patient denies any sick symptoms such as fever,chills increased cough or congestion. Patient is afebrile without leukocytosis. States he has O2 at home. On initial exam: scattered rhonchi throughout all lung fields and mildly diminished air movement no wheezing. Saturating 94 percent on 2L. Low suspicion of Aspiration PNA (although patient is now covered for 24 hours due to lack of above symptoms). Possible aspiration pneumonitis in setting of alcohol abuse versus COPD exacerbation although is not wheezing and appears to be @ baseline respiratory status. Procal (-). Duo nebs ordered while inpatient.  CXR: No acute pathology    Plan:  Ambulatory referral to pulm provided on discharge  Continue to monitor off antibiotics in the outpatient setting  Supplemental oxygen at home for comfort  Resume home albuterol on discharge  Continue home Bro-ellipta daily   Alcohol use disorder  Multiple ED visits  for OH intoxification and falls. Per ED notes intoxicated on arrival. Drinks 1 bottle of vodka daily. No symptoms of withdraw at time of eval. Patient was given folic acid 1mg and thiamine prophylaxis 100mg IV x3 doses.  Placed on CIWA protocol.    Plan:  Case management consult  CATCH saw pt, provided outpt rehab information  States he would like to pursue rehab on his own  CAD (coronary artery disease)  Patient has a h/o CAD s/p CABG x 3 2024. Denies any chest pain or cardiac symptoms     Plan:  Continue home Aspirin 81mg QD  Continue home Ranolazine 500 mg BID  Continue Lipitor 40 mg daily  A-fib (HCC)  Chronic. No new symptoms or syncope. Patient is not compliant with meds.    Plan:  Continue eliquis 5mg  Continue cardizem 12hr 120mg BID  Continue metoprolol succinate 150mg BID  COPD (chronic obstructive pulmonary disease) (HCC)  Chronic likely due to tobacco abuse. Smoked at least 1 pack a day for last 40 years. No PFTs on file or pulm follow up. On breo-ellipta and albuterol outpatient although patient states he is not taking. Patient also states he is supposed to use o2 3-4L at home.  Does not appear to be acute exacerbation (currently on 2L)    Plan:  Supplemental oxygen at home for comfort  Resume home albuterol on discharge  Continue home Bro-ellipta daily   Outpatient pulm referral for establishment and PFTs  Tobacco abuse  Smoked at least 1.5 to 2 pack a day for last 40 years.    Plan:  Counseling provided patient in pre-contemplative stage of change   CKD (chronic kidney disease) stage 2, GFR 60-89 ml/min  Lab Results   Component Value Date    EGFR 67 05/01/2025    EGFR 69 04/30/2025    CREATININE 1.15 05/01/2025    CREATININE 1.12 04/30/2025   @ baseline   Heart failure with preserved ejection fraction (HCC)  Wt Readings from Last 3 Encounters:   04/30/25 76 kg (167 lb 8.8 oz)   Euvolemic on exam   Most recent echo feb 2025 (follows with [facility])   Left Ventricle: Left ventricle is normal in size. There  "is mild   concentric hypertrophy. Systolic function is low normal with an ejection   fraction of 50-54%. Wall motion cannot be accurately assessed.   Indeterminate diastolic function.     Right Ventricle: Right ventricle cavity is normal. Systolic function is   reduced. Abnormal tricuspid annular plane systolic excursion (TAPSE) <1.7   cm.    Left Atrium: Left atrium cavity is severely dilated.     Mitral Valve: There is mild to moderate regurgitation.     Tricuspid Valve: There is moderate regurgitation.     Pulmonic Valve: There is trace regurgitation. The estimated pulmonary   artery systolic pressure is 63.9 mmHg.   Wound of right buttock    Open wound on right buttock, approximately 2 cm deep. On 4/14. Closed by general surg after prior fall  Staples removed healing well in image above   Type 2 diabetes mellitus, without long-term current use of insulin (HCC)  No results found for: \"HGBA1C\"    Recent Labs     05/01/25  0715 05/01/25  1101   POCGLU 85 130       Blood Sugar Average: Last 72 hrs:  (P) 107.5     Medical Problems      Discharging Physician / Practitioner: Meggan Arroyo MD  PCP: No primary care provider on file.  Admission Date:   Admission Orders (From admission, onward)       Ordered        04/30/25 2106  INPATIENT ADMISSION  Once                          Discharge Date: 05/01/25    Consultations During Hospital Stay:  Case management    Procedures Performed:   CT C-spine   CT head  Chest x-ray  XR trauma    Significant Findings / Test Results:   None    Incidental Findings:   None    Test Results Pending at Discharge (will require follow up):   None     Outpatient Tests Requested:  None    Complications: None    Reason for Admission: Fall    Hospital Course:   Srinivasa Walters is a 65 y.o. male patient who originally presented to the hospital on 4/30/2025 due to a mechanical fall at the home.  Reports drinking a bottle of vodka nightly.  Trauma B workup upon arrival to the ED was negative.  " "Remainder of imaging and workup was unremarkable.  Abnormal lung sounds initially heard on exam, resolved with DuoNeb treatments.  Patient remained on oxygen during admission, states he has home oxygen.  CM was consulted, patient was seen by RONNIE, states he would like to pursue outpatient rehab for alcohol use on his own.  Provided list of resources.  On day of discharge, patient in stable condition and agreeable to going home.    Please see above list of diagnoses and related plan for additional information.     Condition at Discharge: stable    Discharge Day Visit / Exam:   Subjective: Patient seen and examined at bedside.  He he reports no concerns at this time.  States that he is comfortable, denies any headache, nausea, vomiting, chest pain, shortness of breath, dizziness, or difficulties passing void or BM.  Patient states he feels comfortable with discharge today, and would like to pursue outpatient rehab for alcohol use on his own.  Vitals: Blood Pressure: 119/75 (05/01/25 1121)  Pulse: 79 (05/01/25 1116)  Temperature: 98 °F (36.7 °C) (05/01/25 1116)  Temp Source: Oral (05/01/25 0315)  Respirations: 18 (05/01/25 1116)  Height: 6' 2\" (188 cm) (04/30/25 2345)  Weight - Scale: 76 kg (167 lb 8.8 oz) (04/30/25 2345)  SpO2: (!) 88 % (05/01/25 1116)  Physical Exam  Vitals reviewed.   Constitutional:       General: He is sleeping. He is not in acute distress.     Interventions: Nasal cannula in place.   HENT:      Head: Normocephalic.      Mouth/Throat:      Mouth: Mucous membranes are moist.      Pharynx: Oropharynx is clear.   Cardiovascular:      Pulses: Normal pulses.      Heart sounds: Heart sounds are distant.   Pulmonary:      Effort: No respiratory distress.      Breath sounds: No wheezing, rhonchi or rales.      Comments: Transmitted upper airway sounds, lungs otherwise clear to auscultation bilaterally.  Abdominal:      General: Bowel sounds are normal. There is no distension.      Palpations: Abdomen is " soft.   Musculoskeletal:      Right lower leg: No edema.      Left lower leg: No edema.   Skin:     General: Skin is warm and dry.      Capillary Refill: Capillary refill takes less than 2 seconds.      Comments: Multiple lacerations and abrasions present along bilateral upper extremities.  Few lacerations noted on the bilateral lower extremities.   Neurological:      Mental Status: Mental status is at baseline.        Discharge instructions/Information to patient and family:   See after visit summary for information provided to patient and family.      Provisions for Follow-Up Care:  See after visit summary for information related to follow-up care and any pertinent home health orders.      Mobility at time of Discharge:   Basic Mobility Inpatient Raw Score: 16  JH-HLM Goal: 5: Stand one or more mins  JH-HLM Achieved: 5: Stand (1 or more minutes)  HLM Goal achieved. Continue to encourage appropriate mobility.     Disposition:   Home    Planned Readmission: No    Discharge Medications:  See after visit summary for reconciled discharge medications provided to patient and/or family.      Administrative Statements   Meggan Arroyo MD  PGY-1 Family Medicine    **Please Note: This note may have been constructed using a voice recognition system**   no

## 2025-05-01 NOTE — ASSESSMENT & PLAN NOTE
Chronic. No new symptoms or syncope. Patient is not compliant with meds.    Plan:  Continue eliquis 5mg  Continue cardizem 12hr 120mg BID  Continue metoprolol succinate 150mg BID

## 2025-05-01 NOTE — ASSESSMENT & PLAN NOTE
Admission for trauma level B post fall at home on Asprin and eliquis   Mechanical in setting of OH intoxification  Ambulatory dysfunction at baseline   Found down at side of his bed and friend called EMS  Patient denies loss of consciousness or syncope    CT head and CT c-spine- no acute pathology (stable post op fusion noted c2-T2)  Fast exam and trauma x rays negative for acute fractures or pathology     Patient denying any pain at this time    Plan  Cleared trauma  With unknown downtime will check ck  PT/OT eval  Case management consult for high utilize plan

## 2025-05-01 NOTE — ASSESSMENT & PLAN NOTE
>>ASSESSMENT AND PLAN FOR TOBACCO ABUSE WRITTEN ON 5/1/2025  3:48 PM BY VERONIKA GUIDO MD    Smoked at least 1.5 to 2 pack a day for last 40 years.    Plan:  Counseling provided patient in pre-contemplative stage of change

## 2025-05-01 NOTE — ASSESSMENT & PLAN NOTE
Coarse Rales over B/L LL, satting 88% on RA.  -Initiated on ceftriaxone  -Admitted to medicine for further management.

## 2025-05-01 NOTE — ASSESSMENT & PLAN NOTE
Smoked at least 1.5 to 2 pack a day for last 40 years.    Plan:  Counseling provided patient in pre-contemplative stage of change

## 2025-05-01 NOTE — ASSESSMENT & PLAN NOTE
"No results found for: \"HGBA1C\"    Recent Labs     05/01/25  0715 05/01/25  1101   POCGLU 85 130       Blood Sugar Average: Last 72 hrs:  (P) 107.5  "

## 2025-05-01 NOTE — ASSESSMENT & PLAN NOTE
Multiple ED visits for OH intoxification and falls  Per ED notes intoxicated on arrival   Drinks 1 bottle of votka daily  No symptoms of withdraw at time of eval    Plan  CIWA protocol  Give folic acid 1mg and thiamine prophylaxis 100mg IV x3 doses   Fall precautions  Case management consult for high utilizer plan

## 2025-05-01 NOTE — ASSESSMENT & PLAN NOTE
Chronic   No new symptoms or syncope  Patient is not compliant with meds     Plan  Continue eliquis 5mg  Continue cardizem 12hr 120mg BID  Continue metoprolol succinate 150mg BID

## 2025-05-01 NOTE — ASSESSMENT & PLAN NOTE
Admission for trauma level B post fall at home on Asprin and eliquis. Mechanical in setting of OH intoxification, reported drinking bottle of vodka on admission. Ambulatory dysfunction at baseline. Found down at side of his bed and friend called EMS. Patient denies loss of consciousness or syncope. Patient denying any pain at this time. Patient denying any pain at this time.  CT head & CT c-spine: no acute pathology (stable post op fusion noted c2-T2)  Fast exam & trauma x rays: negative for acute fractures or pathology     Plan:  Cleared trauma  Case management consult  CATCH saw pt, provided outpt rehab information  States he would like to pursue rehab on his own

## 2025-05-01 NOTE — ASSESSMENT & PLAN NOTE
Patient has a h/o CAD s/p CABG. [id number]  Denies any chest pain or cardiac symptoms   Continue home Aspirin 81mg QD  Continue home Ranolazine 500 mg BID  Continue Lipitor 40 mg daily

## 2025-05-01 NOTE — ASSESSMENT & PLAN NOTE
Based on chart review this is a long standing issue. Patient states that he had 2-3 glasses of vodka today.

## 2025-05-01 NOTE — ASSESSMENT & PLAN NOTE
Open wound on right buttock, approximately 2 cm deep. On 4/14. Closed by general surg after prior fall  Staples removed healing well in image above

## 2025-05-01 NOTE — ASSESSMENT & PLAN NOTE
Patient has a h/o CAD s/p CABG x 3 2024. Denies any chest pain or cardiac symptoms     Plan:  Continue home Aspirin 81mg QD  Continue home Ranolazine 500 mg BID  Continue Lipitor 40 mg daily

## 2025-05-01 NOTE — ASSESSMENT & PLAN NOTE
Open wound on right buttock, approximately 2 cm deep. On 4/14. Closed by general surg after prior fall  Staples removed healing well in image above      Continue to monitor and provide local wound care

## 2025-05-02 ENCOUNTER — HOSPITAL ENCOUNTER (EMERGENCY)
Facility: HOSPITAL | Age: 65
Discharge: HOME | End: 2025-05-03
Payer: COMMERCIAL

## 2025-05-02 ENCOUNTER — APPOINTMENT (EMERGENCY)
Dept: RADIOLOGY | Facility: HOSPITAL | Age: 65
End: 2025-05-02
Payer: COMMERCIAL

## 2025-05-02 DIAGNOSIS — F10.10 ALCOHOL ABUSE: ICD-10-CM

## 2025-05-02 DIAGNOSIS — N17.9 AKI (ACUTE KIDNEY INJURY) (HCC): ICD-10-CM
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
## 2025-05-02 LAB
ALBUMIN SERPL BCG-MCNC: 2.7 G/DL (ref 3.5–5)
ALP SERPL-CCNC: 111 U/L (ref 34–104)
ALT SERPL W P-5'-P-CCNC: 19 U/L (ref 7–52)
ANION GAP SERPL CALCULATED.3IONS-SCNC: 13 MMOL/L (ref 4–13)
APAP SERPL-MCNC: <2 UG/ML (ref 10–20)
APTT PPP: 33 SECONDS (ref 23–34)
AST SERPL W P-5'-P-CCNC: 66 U/L (ref 13–39)
ATRIAL RATE: 87 BPM
BASOPHILS # BLD AUTO: 0.05 THOUSANDS/ÂΜL (ref 0–0.1)
BASOPHILS NFR BLD AUTO: 1 % (ref 0–1)
BILIRUB SERPL-MCNC: 0.59 MG/DL (ref 0.2–1)
BUN SERPL-MCNC: 16 MG/DL (ref 5–25)
CALCIUM ALBUM COR SERPL-MCNC: 7.6 MG/DL (ref 8.3–10.1)
CALCIUM SERPL-MCNC: 6.6 MG/DL (ref 8.4–10.2)
CHLORIDE SERPL-SCNC: 100 MMOL/L (ref 96–108)
CO2 SERPL-SCNC: 20 MMOL/L (ref 21–32)
CREAT SERPL-MCNC: 1.63 MG/DL (ref 0.6–1.3)
EOSINOPHIL # BLD AUTO: 0.06 THOUSAND/ÂΜL (ref 0–0.61)
EOSINOPHIL NFR BLD AUTO: 2 % (ref 0–6)
ERYTHROCYTE [DISTWIDTH] IN BLOOD BY AUTOMATED COUNT: 19.2 % (ref 11.6–15.1)
ETHANOL SERPL-MCNC: 385 MG/DL
GFR SERPL CREATININE-BSD FRML MDRD: 44 ML/MIN/1.73SQ M
GLUCOSE SERPL-MCNC: 93 MG/DL (ref 65–140)
HCT VFR BLD AUTO: 30 % (ref 36.5–49.3)
HGB BLD-MCNC: 9.9 G/DL (ref 12–17)
IMM GRANULOCYTES # BLD AUTO: 0.02 THOUSAND/UL (ref 0–0.2)
IMM GRANULOCYTES NFR BLD AUTO: 1 % (ref 0–2)
INR PPP: 1.16 (ref 0.85–1.19)
LACTATE SERPL-SCNC: 1.2 MMOL/L (ref 0.5–2)
LACTATE SERPL-SCNC: 2.1 MMOL/L (ref 0.5–2)
LYMPHOCYTES # BLD AUTO: 1.5 THOUSANDS/ÂΜL (ref 0.6–4.47)
LYMPHOCYTES NFR BLD AUTO: 37 % (ref 14–44)
MCH RBC QN AUTO: 30.7 PG (ref 26.8–34.3)
MCHC RBC AUTO-ENTMCNC: 33 G/DL (ref 31.4–37.4)
MCV RBC AUTO: 93 FL (ref 82–98)
MONOCYTES # BLD AUTO: 0.56 THOUSAND/ÂΜL (ref 0.17–1.22)
MONOCYTES NFR BLD AUTO: 14 % (ref 4–12)
NEUTROPHILS # BLD AUTO: 1.88 THOUSANDS/ÂΜL (ref 1.85–7.62)
NEUTS SEG NFR BLD AUTO: 45 % (ref 43–75)
NRBC BLD AUTO-RTO: 0 /100 WBCS
PLATELET # BLD AUTO: 77 THOUSANDS/UL (ref 149–390)
PMV BLD AUTO: 11.5 FL (ref 8.9–12.7)
POTASSIUM SERPL-SCNC: 4.6 MMOL/L (ref 3.5–5.3)
PROCALCITONIN SERPL-MCNC: 0.16 NG/ML
PROT SERPL-MCNC: 5.4 G/DL (ref 6.4–8.4)
PROTHROMBIN TIME: 15.3 SECONDS (ref 12.3–15)
QRS AXIS: 98 DEGREES
QRSD INTERVAL: 96 MS
QT INTERVAL: 432 MS
QTC INTERVAL: 459 MS
RBC # BLD AUTO: 3.22 MILLION/UL (ref 3.88–5.62)
SALICYLATES SERPL-MCNC: <5 MG/DL (ref 5–20)
SODIUM SERPL-SCNC: 133 MMOL/L (ref 135–147)
T WAVE AXIS: 95 DEGREES
VENTRICULAR RATE: 68 BPM
WBC # BLD AUTO: 4.07 THOUSAND/UL (ref 4.31–10.16)

## 2025-05-02 PROCEDURE — 93005 ELECTROCARDIOGRAM TRACING: CPT

## 2025-05-02 PROCEDURE — 36415 COLL VENOUS BLD VENIPUNCTURE: CPT

## 2025-05-02 PROCEDURE — 80143 DRUG ASSAY ACETAMINOPHEN: CPT

## 2025-05-02 PROCEDURE — 70450 CT HEAD/BRAIN W/O DYE: CPT

## 2025-05-02 PROCEDURE — 99285 EMERGENCY DEPT VISIT HI MDM: CPT

## 2025-05-02 PROCEDURE — 80053 COMPREHEN METABOLIC PANEL: CPT

## 2025-05-02 PROCEDURE — 84145 PROCALCITONIN (PCT): CPT

## 2025-05-02 PROCEDURE — 87040 BLOOD CULTURE FOR BACTERIA: CPT

## 2025-05-02 PROCEDURE — 83605 ASSAY OF LACTIC ACID: CPT

## 2025-05-02 PROCEDURE — 85730 THROMBOPLASTIN TIME PARTIAL: CPT

## 2025-05-02 PROCEDURE — 80179 DRUG ASSAY SALICYLATE: CPT

## 2025-05-02 PROCEDURE — 82077 ASSAY SPEC XCP UR&BREATH IA: CPT

## 2025-05-02 PROCEDURE — 99284 EMERGENCY DEPT VISIT MOD MDM: CPT

## 2025-05-02 PROCEDURE — 96361 HYDRATE IV INFUSION ADD-ON: CPT

## 2025-05-02 PROCEDURE — 96360 HYDRATION IV INFUSION INIT: CPT

## 2025-05-02 PROCEDURE — 85025 COMPLETE CBC W/AUTO DIFF WBC: CPT

## 2025-05-02 PROCEDURE — 85610 PROTHROMBIN TIME: CPT

## 2025-05-02 RX ORDER — LORAZEPAM 1 MG/1
1 TABLET ORAL ONCE
Status: COMPLETED | OUTPATIENT
Start: 2025-05-02 | End: 2025-05-02

## 2025-05-02 RX ADMIN — LORAZEPAM 1 MG: 1 TABLET ORAL at 21:42

## 2025-05-02 RX ADMIN — SODIUM CHLORIDE 1000 ML: 0.9 INJECTION, SOLUTION INTRAVENOUS at 17:07

## 2025-05-02 RX ADMIN — SODIUM CHLORIDE 1000 ML: 0.9 INJECTION, SOLUTION INTRAVENOUS at 18:15

## 2025-05-02 RX ADMIN — SODIUM CHLORIDE 1000 ML: 0.9 INJECTION, SOLUTION INTRAVENOUS at 19:08

## 2025-05-02 NOTE — ED PROVIDER NOTES
Time reflects when diagnosis was documented in both MDM as applicable and the Disposition within this note       Time User Action Codes Description Comment    5/3/2025  3:35 AM iFto Liang [W19.XXXA] Fall, initial encounter     5/3/2025  3:35 AM Fito Liang [N17.9] ENMA (acute kidney injury) (HCC)     5/3/2025  3:36 AM Fito Liang [F10.10] Alcohol abuse           ED Disposition       ED Disposition   Discharge    Condition   Stable    Date/Time   Sat May 3, 2025  3:35 AM    Comment   Gregg Partida discharge to home/self care.                   Assessment & Plan       Medical Decision Making  65M presenting to the ED with acute alcohol intoxication and fall. Patient hypotensive on arrival, per chart review has had multiple recent visits where he was hypotensive and likely 2/2 dehydration. Septic workup obtained and significant for ENMA and elevated lactate. After multiple liters IVF, patient then normotensive and lactic cleared. Patient signed out to nocturnist with plan for observation until sober, offer of rehab, and discharge with outpatient f/u.     Amount and/or Complexity of Data Reviewed  Labs: ordered. Decision-making details documented in ED Course.  Radiology: ordered.    Risk  Prescription drug management.        ED Course as of 05/04/25 0936   Fri May 02, 2025   1747 Creatinine(!): 1.63   1749 LACTIC ACID(!): 2.1   1805 ETHANOL(!): 385       Medications   sodium chloride 0.9 % bolus 1,000 mL (0 mL Intravenous Stopped 5/2/25 1807)   sodium chloride 0.9 % bolus 1,000 mL (0 mL Intravenous Stopped 5/2/25 1915)   sodium chloride 0.9 % bolus 1,000 mL (0 mL Intravenous Stopped 5/2/25 2008)   LORazepam (ATIVAN) tablet 1 mg (1 mg Oral Given 5/2/25 2142)       ED Risk Strat Scores                    No data recorded        SBIRT 20yo+      Flowsheet Row Most Recent Value   Initial Alcohol Screen: US AUDIT-C     1. How often do you have a drink containing alcohol? 0 Filed at: 05/02/2025  1619   2. How many drinks containing alcohol do you have on a typical day you are drinking?  0 Filed at: 05/02/2025 1619   3a. Male UNDER 65: How often do you have five or more drinks on one occasion? 0 Filed at: 05/02/2025 1619   3b. FEMALE Any Age, or MALE 65+: How often do you have 4 or more drinks on one occassion? 0 Filed at: 05/02/2025 1619   Audit-C Score 0 Filed at: 05/02/2025 1619   BRIGIDA: How many times in the past year have you...    Used an illegal drug or used a prescription medication for non-medical reasons? Never Filed at: 05/02/2025 1619                            History of Present Illness       Chief Complaint   Patient presents with    Fall     Per EMS pt fell and is intoxicated. Fell sometime this morning hitting back of head, on thinners       Past Medical History:   Diagnosis Date    Acute on chronic kidney failure  (HCC)     Alcohol abuse     Alcohol withdrawal (HCC) 06/07/2019    Atrial fibrillation (HCC)     Cancer (HCC)     prostate ca,had radiation    Cardiac disease     stents,then triple bypass    COPD (chronic obstructive pulmonary disease) (HCC)     Coronary artery disease     ETOH abuse     Heart failure (HCC)     History of heart surgery     says triple bypass [facility]    Hx of heart artery stent     2014    Hyperlipidemia     Hypertension     Hyponatremia 10/17/2019    Hypovolemic shock ([facility]) 12/22/2019    Lumbar spondylitis ([facility]) 10/13/2022    Metabolic acidosis, increased anion gap 04/21/2021    Nasal bone fracture 10/10/2022    Prostate CA ([facility])     S/P CABG x 3     2004    Sleep apnea       Past Surgical History:   Procedure Laterality Date    CARDIAC CATHETERIZATION      2 stents    CORONARY ARTERY BYPASS GRAFT      CORONARY ARTERY BYPASS GRAFT  2004    IA ARTHRD ANT INTERBODY MIN DSC CRV BELOW C2 N/A 12/16/2020    Procedure: Anterior cervical discectomy with fusion C4-C7; Posterior cervical decompression and fusion C2-T2;  Surgeon: David Rowell MD;  Location: [facility]  OR;  Service: Neurosurgery    TONSILLECTOMY        Family History   Problem Relation Age of Onset    Diabetes Mother     Uterine cancer Mother     COPD Father     Hypertension Father       Social History     Tobacco Use    Smoking status: Every Day     Current packs/day: 1.50     Average packs/day: 1.5 packs/day for 40.0 years (60.0 ttl pk-yrs)     Types: Cigarettes    Smokeless tobacco: Never   Vaping Use    Vaping status: Never Used   Substance Use Topics    Alcohol use: Yes     Alcohol/week: 4.0 standard drinks of alcohol     Types: 4 Standard drinks or equivalent per week     Comment: quart of vodka daily    Drug use: No      E-Cigarette/Vaping    E-Cigarette Use Never User       E-Cigarette/Vaping Substances    Nicotine Yes     THC No     CBD No     Flavoring No     Other No     Unknown No       I have reviewed and agree with the history as documented.     65-year-old male with alcohol use disorder, presents emergency department due to fall with head strike.  Patient notes that his last drink was a few hours ago.  Does not remember any of the events today.  Denies any complaints at this time.        Review of Systems   All other systems reviewed and are negative.          Objective       ED Triage Vitals   Temperature Pulse Blood Pressure Respirations SpO2 Patient Position - Orthostatic VS   05/02/25 1619 05/02/25 1619 05/02/25 1619 05/02/25 1619 05/02/25 1619 05/02/25 1930   97.6 °F (36.4 °C) 83 (!) 79/55 16 92 % Lying      Temp src Heart Rate Source BP Location FiO2 (%) Pain Score    -- 05/02/25 1930 05/02/25 1930 -- 05/02/25 1619     Monitor Right arm  No Pain      Vitals      Date and Time Temp Pulse SpO2 Resp BP Pain Score FACES Pain Rating User   05/03/25 0900 -- 102 94 % 18 143/96 -- -- OE   05/03/25 0730 -- -- -- 18 149/89 -- -- OE   05/03/25 0600 -- -- -- -- 138/77 -- -- DD   05/03/25 0530 -- -- -- -- 149/88 -- --    05/03/25 0500 -- -- -- -- 146/84 -- --    05/03/25 0400 -- 80 93 % 20 148/94 -- --  DD   05/03/25 0100 -- -- -- 20 94/54 -- -- DD   05/02/25 2300 -- 82 92 % 20 109/66 -- -- DD   05/02/25 2230 -- 98 94 % 19 101/52 -- -- DD   05/02/25 2200 -- 88 96 % 19 115/66 -- -- DD   05/02/25 2145 -- 92 95 % 19 99/57 -- -- DD   05/02/25 2100 -- 72 98 % 18 103/55 -- -- DD   05/02/25 2045 -- -- 98 % 18 115/68 -- -- DD   05/02/25 2000 -- 74 98 % 18 101/59 -- -- DD   05/02/25 1945 -- 74 91 % 18 102/61 -- -- DD   05/02/25 1930 -- 73 98 % 18 93/53 -- -- DD   05/02/25 1917 -- 77 98 % 18 105/60 -- -- DQ   05/02/25 1900 -- -- -- -- 88/64 -- -- DQ   05/02/25 1757 -- 80 98 % 17 86/52 -- -- DQ   05/02/25 1730 -- 75 96 % 18 86/54 -- -- DQ   05/02/25 1709 -- 71 95 % 17 82/51 -- -- DQ   05/02/25 1619 97.6 °F (36.4 °C) 83 92 % 16 79/55 No Pain -- MERLIN            Physical Exam  Constitutional:       General: He is not in acute distress.     Appearance: Normal appearance. He is not ill-appearing or toxic-appearing.   HENT:      Head: Normocephalic and atraumatic.      Mouth/Throat:      Mouth: Mucous membranes are dry.   Eyes:      Extraocular Movements: Extraocular movements intact.   Pulmonary:      Effort: Pulmonary effort is normal.   Abdominal:      Palpations: Abdomen is soft.      Tenderness: There is no abdominal tenderness.   Musculoskeletal:      Right lower leg: No edema.      Left lower leg: No edema.   Skin:     General: Skin is warm and dry.      Comments: Scattered bruising and scabbing on bilateral arms.   Neurological:      Mental Status: He is alert.   Psychiatric:         Mood and Affect: Mood normal.         Results Reviewed       Procedure Component Value Units Date/Time    Blood culture #1 [[id number]] Collected: 05/02/25 1701    Lab Status: Preliminary result Specimen: Blood from Arm, Right Updated: 05/03/25 2201     Blood Culture No Growth at 24 hrs.    Blood culture #2 [[id number]] Collected: 05/02/25 1701    Lab Status: Preliminary result Specimen: Blood from Arm, Right Updated: 05/03/25 2201     Blood  Culture No Growth at 24 hrs.    Lactic acid 2 Hours [[id number]]  (Normal) Collected: 05/02/25 1914    Lab Status: Final result Specimen: Blood from Line, Venous Updated: 05/02/25 2017     LACTIC ACID 1.2 mmol/L     Narrative:      Result may be elevated if tourniquet was used during collection.    Lactic acid [[id number]]  (Abnormal) Collected: 05/02/25 1701    Lab Status: Final result Specimen: Blood from Arm, Right Updated: 05/02/25 1747     LACTIC ACID 2.1 mmol/L     Narrative:      Result may be elevated if tourniquet was used during collection.    Comprehensive metabolic panel [[id number]]  (Abnormal) Collected: 05/02/25 1705    Lab Status: Final result Specimen: Blood from Arm, Right Updated: 05/02/25 1746     Sodium 133 mmol/L      Potassium 4.6 mmol/L      Chloride 100 mmol/L      CO2 20 mmol/L      ANION GAP 13 mmol/L      BUN 16 mg/dL      Creatinine 1.63 mg/dL      Glucose 93 mg/dL      Calcium 6.6 mg/dL      Corrected Calcium 7.6 mg/dL      AST 66 U/L      ALT 19 U/L      Alkaline Phosphatase 111 U/L      Total Protein 5.4 g/dL      Albumin 2.7 g/dL      Total Bilirubin 0.59 mg/dL      eGFR 44 ml/min/1.73sq m     Narrative:      National Kidney Disease Foundation guidelines for Chronic Kidney Disease (CKD):     Stage 1 with normal or high GFR (GFR > 90 mL/min/1.73 square meters)    Stage 2 Mild CKD (GFR = 60-89 mL/min/1.73 square meters)    Stage 3A Moderate CKD (GFR = 45-59 mL/min/1.73 square meters)    Stage 3B Moderate CKD (GFR = 30-44 mL/min/1.73 square meters)    Stage 4 Severe CKD (GFR = 15-29 mL/min/1.73 square meters)    Stage 5 End Stage CKD (GFR <15 mL/min/1.73 square meters)  Note: GFR calculation is accurate only with a steady state creatinine    Salicylate level [[id number]]  (Abnormal) Collected: 05/02/25 1705    Lab Status: Final result Specimen: Blood from Arm, Right Updated: 05/02/25 1746     Salicylate Lvl <5 mg/dL     Acetaminophen level-If concentration is detectable, please discuss with  medical  on call. [[id number]]  (Abnormal) Collected: 05/02/25 1705    Lab Status: Final result Specimen: Blood from Arm, Right Updated: 05/02/25 1746     Acetaminophen Level <2 ug/mL     CBC and differential [[id number]]  (Abnormal) Collected: 05/02/25 1701    Lab Status: Final result Specimen: Blood from Arm, Right Updated: 05/02/25 1745     WBC 4.07 Thousand/uL      RBC 3.22 Million/uL      Hemoglobin 9.9 g/dL      Hematocrit 30.0 %      MCV 93 fL      MCH 30.7 pg      MCHC 33.0 g/dL      RDW 19.2 %      MPV 11.5 fL      Platelets 77 Thousands/uL      nRBC 0 /100 WBCs      Segmented % 45 %      Immature Grans % 1 %      Lymphocytes % 37 %      Monocytes % 14 %      Eosinophils Relative 2 %      Basophils Relative 1 %      Absolute Neutrophils 1.88 Thousands/µL      Absolute Immature Grans 0.02 Thousand/uL      Absolute Lymphocytes 1.50 Thousands/µL      Absolute Monocytes 0.56 Thousand/µL      Eosinophils Absolute 0.06 Thousand/µL      Basophils Absolute 0.05 Thousands/µL     Procalcitonin [[id number]]  (Normal) Collected: 05/02/25 1701    Lab Status: Final result Specimen: Blood from Arm, Right Updated: 05/02/25 1742     Procalcitonin 0.16 ng/ml     Protime-INR [[id number]]  (Abnormal) Collected: 05/02/25 1701    Lab Status: Final result Specimen: Blood from Arm, Right Updated: 05/02/25 1728     Protime 15.3 seconds      INR 1.16    Narrative:      INR Therapeutic Range    Indication                                             INR Range      Atrial Fibrillation                                               2.0-3.0  Hypercoagulable State                                    2.0.2.3  Left Ventricular Asist Device                            2.0-3.0  Mechanical Heart Valve                                  -    Aortic(with afib, MI, embolism, HF, LA enlargement,    and/or coagulopathy)                                     2.0-3.0 (2.5-3.5)     Mitral                                                              2.5-3.5  Prosthetic/Bioprosthetic Heart Valve               2.0-3.0  Venous thromboembolism (VTE: VT, PE        2.0-3.0    APTT [[id number]]  (Normal) Collected: 05/02/25 1701    Lab Status: Final result Specimen: Blood from Arm, Right Updated: 05/02/25 1728     PTT 33 seconds     Ethanol [[id number]]  (Abnormal) Collected: 05/02/25 1701    Lab Status: Final result Specimen: Blood from Arm, Right Updated: 05/02/25 1728     Ethanol Lvl 385 mg/dL             CT head without contrast   Final Interpretation by Edgar Sanchez MD (05/02 1742)      No acute intracranial abnormality.  Chronic microangiopathic changes.                  Workstation performed: [id number]             Procedures    ED Medication and Procedure Management   Prior to Admission Medications   Prescriptions Last Dose Informant Patient Reported? Taking?   Blood Glucose Monitoring Suppl (ONE TOUCH ULTRA MINI) w/Device KIT  Self Yes No   Sig: Use as directed   Blood Pressure Monitoring (Adult Blood Pressure Cuff Lg) KIT   No No   Sig: Use in the morning   Breo Ellipta 200-25 MCG/ACT inhaler   No No   Sig: Inhale 1 puff daily Rinse mouth after use.   ONETOUCH DELICA LANCETS FINE MISC  Self Yes No   Sig: 3 (three) times a day Test   Thiamine HCl (vitamin B-1) 100 MG TABS   No No   Sig: Take 1 tablet (100 mg total) by mouth daily   albuterol (Ventolin HFA) 90 mcg/act inhaler   No No   Sig: Inhale 2 puffs every 4 (four) hours as needed for wheezing   aluminum-magnesium hydroxide-simethicone (MAALOX) 1038-6383-944 mg/30 mL suspension   No No   Sig: Take 30 mL by mouth every 4 (four) hours as needed for indigestion or heartburn   apixaban (Eliquis) 5 mg   No No   Sig: Take 1 tablet (5 mg total) by mouth 2 (two) times a day   aspirin 81 mg EC tablet   No No   Sig: Take 1 tablet (81 mg total) by mouth daily   atorvastatin (LIPITOR) 40 mg tablet   No No   Sig: Take 1 tablet (40 mg total) by mouth daily   diltiazem (CARDIZEM SR) 120 mg 12 hr capsule   No No   Sig:  Take 1 capsule (120 mg total) by mouth every 12 (twelve) hours   ferrous sulfate 325 (65 Fe) mg tablet   No No   Sig: Take 1 tablet (325 mg total) by mouth daily with breakfast   folic acid (FOLVITE) 1 mg tablet   No No   Sig: Take 1 tablet (1,000 mcg total) by mouth daily   gabapentin (NEURONTIN) 300 mg capsule   No No   Sig: Take 1 capsule (300 mg total) by mouth 3 (three) times a day   lidocaine (Lidoderm) 5 %   No No   Sig: Apply 1 patch topically over 12 hours daily Remove & Discard patch within 12 hours or as directed by MD   magnesium Oxide (MAG-OX) 400 mg TABS   No No   Sig: Take 2 tablets (800 mg total) by mouth 2 (two) times a day   metFORMIN (GLUCOPHAGE) 500 mg tablet   No No   Sig: Take 1 tablet (500 mg total) by mouth daily with breakfast   metoprolol tartrate (LOPRESSOR) 100 mg tablet   No No   Sig: Take 1.5 tablets (150 mg total) by mouth every 12 (twelve) hours   naltrexone (REVIA) 50 mg tablet   Yes No   Sig: Take 50 mg by mouth daily   nicotine (NICODERM CQ) 21 mg/24 hr TD 24 hr patch   No No   Sig: Place 1 patch on the skin over 24 hours daily   pantoprazole (PROTONIX) 40 mg tablet   No No   Sig: Take 1 tablet (40 mg total) by mouth daily   ranolazine (RANEXA) 500 mg 12 hr tablet   No No   Sig: Take 1 tablet (500 mg total) by mouth every 12 (twelve) hours   saccharomyces boulardii (FLORASTOR) 250 mg capsule   No No   Sig: Take 1 capsule (250 mg total) by mouth 2 (two) times a day   senna-docusate sodium (SENOKOT S) 8.6-50 mg per tablet   No No   Sig: Take 1 tablet by mouth daily as needed for constipation   tamsulosin (FLOMAX) 0.4 mg   No No   Sig: Take 1 capsule (0.4 mg total) by mouth daily      Facility-Administered Medications: None     Discharge Medication List as of 5/3/2025  9:31 AM        CONTINUE these medications which have NOT CHANGED    Details   albuterol (Ventolin HFA) 90 mcg/act inhaler Inhale 2 puffs every 4 (four) hours as needed for wheezing, Starting Wed 4/16/2025, Normal       aluminum-magnesium hydroxide-simethicone (MAALOX) 2366-8765-497 mg/30 mL suspension Take 30 mL by mouth every 4 (four) hours as needed for indigestion or heartburn, Starting Wed 4/16/2025, Normal      apixaban (Eliquis) 5 mg Take 1 tablet (5 mg total) by mouth 2 (two) times a day, Starting Wed 4/16/2025, Normal      aspirin 81 mg EC tablet Take 1 tablet (81 mg total) by mouth daily, Starting Wed 4/16/2025, Normal      atorvastatin (LIPITOR) 40 mg tablet Take 1 tablet (40 mg total) by mouth daily, Starting Wed 4/16/2025, Normal      Blood Glucose Monitoring Suppl (ONE TOUCH ULTRA MINI) w/Device KIT Use as directed, Starting Thu 5/16/2019, Historical Med      Blood Pressure Monitoring (Adult Blood Pressure Cuff Lg) KIT Use in the morning, Starting Thu 12/14/2023, Normal      Breo Ellipta 200-25 MCG/ACT inhaler Inhale 1 puff daily Rinse mouth after use., Starting Wed 4/16/2025, Normal      diltiazem (CARDIZEM SR) 120 mg 12 hr capsule Take 1 capsule (120 mg total) by mouth every 12 (twelve) hours, Starting Wed 4/16/2025, Normal      ferrous sulfate 325 (65 Fe) mg tablet Take 1 tablet (325 mg total) by mouth daily with breakfast, Starting Wed 4/16/2025, Normal      folic acid (FOLVITE) 1 mg tablet Take 1 tablet (1,000 mcg total) by mouth daily, Starting Wed 4/16/2025, Normal      gabapentin (NEURONTIN) 300 mg capsule Take 1 capsule (300 mg total) by mouth 3 (three) times a day, Starting Wed 4/16/2025, Normal      lidocaine (Lidoderm) 5 % Apply 1 patch topically over 12 hours daily Remove & Discard patch within 12 hours or as directed by MD, Starting Wed 4/16/2025, Normal      magnesium Oxide (MAG-OX) 400 mg TABS Take 2 tablets (800 mg total) by mouth 2 (two) times a day, Starting Wed 4/16/2025, Normal      metFORMIN (GLUCOPHAGE) 500 mg tablet Take 1 tablet (500 mg total) by mouth daily with breakfast, Starting Wed 4/16/2025, Normal      metoprolol tartrate (LOPRESSOR) 100 mg tablet Take 1.5 tablets (150 mg total) by  mouth every 12 (twelve) hours, Starting Wed 4/16/2025, Normal      naltrexone (REVIA) 50 mg tablet Take 50 mg by mouth daily, Starting Sun 2/16/2025, Until Fri 8/15/2025, Historical Med      nicotine (NICODERM CQ) 21 mg/24 hr TD 24 hr patch Place 1 patch on the skin over 24 hours daily, Starting Wed 4/16/2025, Normal      ONETOUCH DELICA LANCETS FINE MISC 3 (three) times a day Test, Starting Thu 5/16/2019, Historical Med      pantoprazole (PROTONIX) 40 mg tablet Take 1 tablet (40 mg total) by mouth daily, Starting Wed 4/16/2025, Normal      ranolazine (RANEXA) 500 mg 12 hr tablet Take 1 tablet (500 mg total) by mouth every 12 (twelve) hours, Starting Wed 4/16/2025, Normal      saccharomyces boulardii (FLORASTOR) 250 mg capsule Take 1 capsule (250 mg total) by mouth 2 (two) times a day, Starting Wed 4/16/2025, Normal      senna-docusate sodium (SENOKOT S) 8.6-50 mg per tablet Take 1 tablet by mouth daily as needed for constipation, Starting Wed 4/16/2025, Normal      tamsulosin (FLOMAX) 0.4 mg Take 1 capsule (0.4 mg total) by mouth daily, Starting Wed 4/16/2025, Normal      Thiamine HCl (vitamin B-1) 100 MG TABS Take 1 tablet (100 mg total) by mouth daily, Starting Wed 4/16/2025, Normal           No discharge procedures on file.  ED SEPSIS DOCUMENTATION   Time reflects when diagnosis was documented in both MDM as applicable and the Disposition within this note       Time User Action Codes Description Comment    5/3/2025  3:35 AM Fito Liang [W19.XXXA] Fall, initial encounter     5/3/2025  3:35 AM Fito Liang [N17.9] ENMA (acute kidney injury) (HCC)     5/3/2025  3:36 AM Fito Liang [F10.10] Alcohol abuse                  Tirso Gregory MD  05/04/25 0990

## 2025-05-02 NOTE — UTILIZATION REVIEW
NOTIFICATION OF ADMISSION DISCHARGE   This is a Notification of Discharge from [facility]. Please be advised that this patient has been discharge from our facility. Below you will find the admission and discharge date and time including the patient’s disposition.   UTILIZATION REVIEW CONTACT:  Utilization Review Assistants  Network Utilization Review Department  Phone: [phone] x carefully listen to the prompts. All voicemails are confidential.  Email: [email or web address]     ADMISSION INFORMATION  PRESENTATION DATE: 4/30/2025  7:31 PM  OBERVATION ADMISSION DATE: N/A  INPATIENT ADMISSION DATE: 4/30/25  9:06 PM   DISCHARGE DATE: 5/1/2025  8:08 PM   DISPOSITION:Home/Self Care    Network Utilization Review Department  ATTENTION: Please call with any questions or concerns to [phone] and carefully listen to the prompts so that you are directed to the right person. All voicemails are confidential.   For Discharge needs, contact Care Management DC Support Team at [phone] opt. 2  Send all requests for admission clinical reviews, approved or denied determinations and any other requests to dedicated fax number below belonging to the campus where the patient is receiving treatment. List of dedicated fax numbers for the Facilities:  FACILITY NAME UR FAX NUMBER   ADMISSION DENIALS (Administrative/Medical Necessity) [phone]   DISCHARGE SUPPORT TEAM ([facility]) [phone]   PARENT CHILD HEALTH (Maternity/NICU/Pediatrics) [phone]   General acute hospital [phone]   [facility] [phone]   [facility] [phone]   [facility] [phone]   [facility] [phone]   [facility] [phone]   [facility] [phone]   [facility] [phone]   [facility]  [facility] [phone]   [facility] [phone]   [facility] [phone]   [facility] [phone]

## 2025-05-03 VITALS
HEART RATE: 102 BPM | BODY MASS INDEX: 23.74 KG/M2 | DIASTOLIC BLOOD PRESSURE: 96 MMHG | HEIGHT: 74 IN | WEIGHT: 185 LBS | TEMPERATURE: 97.6 F | RESPIRATION RATE: 18 BRPM | OXYGEN SATURATION: 94 % | SYSTOLIC BLOOD PRESSURE: 143 MMHG

## 2025-05-03 NOTE — ED NOTES
Patients bed soiled, patient able to ambulate to bed side commode with this RN and entire bed cleaned for patient. No other requests at this time       Faith Chavez RN  05/02/25 0102

## 2025-05-04 ENCOUNTER — HOSPITAL ENCOUNTER (INPATIENT)
Facility: HOSPITAL | Age: 65
LOS: 4 days | Discharge: HOME | DRG: 308 | End: 2025-05-09
Attending: SURGERY
Payer: COMMERCIAL

## 2025-05-04 ENCOUNTER — APPOINTMENT (EMERGENCY)
Dept: CT IMAGING | Facility: HOSPITAL | Age: 65
DRG: 308 | End: 2025-05-04
Payer: COMMERCIAL

## 2025-05-04 ENCOUNTER — APPOINTMENT (OUTPATIENT)
Dept: RADIOLOGY | Facility: HOSPITAL | Age: 65
DRG: 308 | End: 2025-05-04
Payer: COMMERCIAL

## 2025-05-04 DIAGNOSIS — Z98.890 HX OF LAMINECTOMY: ICD-10-CM

## 2025-05-04 DIAGNOSIS — I25.10 CAD (CORONARY ARTERY DISEASE): ICD-10-CM
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
## 2025-05-04 DIAGNOSIS — I48.91 A-FIB (HCC): ICD-10-CM

## 2025-05-04 DIAGNOSIS — I10 HYPERTENSION: ICD-10-CM

## 2025-05-04 DIAGNOSIS — A04.71 RECURRENT CLOSTRIDIOIDES DIFFICILE DIARRHEA: ICD-10-CM

## 2025-05-04 DIAGNOSIS — R93.7 ABNORMAL CT SCAN, CERVICAL SPINE: ICD-10-CM

## 2025-05-04 LAB
ABO GROUP BLD: NORMAL
ABO GROUP BLD: NORMAL
ANION GAP SERPL CALCULATED.3IONS-SCNC: 9 MMOL/L (ref 4–13)
ANION GAP SERPL CALCULATED.3IONS-SCNC: 9 MMOL/L (ref 4–13)
BASE EXCESS BLDA CALC-SCNC: 2 MMOL/L (ref -2–3)
BASE EXCESS BLDA CALC-SCNC: 2 MMOL/L (ref -2–3)
BLD GP AB SCN SERPL QL: NEGATIVE
BLD GP AB SCN SERPL QL: NEGATIVE
BUN SERPL-MCNC: 10 MG/DL (ref 5–25)
BUN SERPL-MCNC: 10 MG/DL (ref 5–25)
CA-I BLD-SCNC: 0.93 MMOL/L (ref 1.12–1.32)
CA-I BLD-SCNC: 0.93 MMOL/L (ref 1.12–1.32)
CALCIUM SERPL-MCNC: 7.3 MG/DL (ref 8.4–10.2)
CALCIUM SERPL-MCNC: 7.3 MG/DL (ref 8.4–10.2)
CHLORIDE SERPL-SCNC: 103 MMOL/L (ref 96–108)
CHLORIDE SERPL-SCNC: 103 MMOL/L (ref 96–108)
CO2 SERPL-SCNC: 29 MMOL/L (ref 21–32)
CO2 SERPL-SCNC: 29 MMOL/L (ref 21–32)
CREAT SERPL-MCNC: 0.9 MG/DL (ref 0.6–1.3)
CREAT SERPL-MCNC: 0.9 MG/DL (ref 0.6–1.3)
ERYTHROCYTE [DISTWIDTH] IN BLOOD BY AUTOMATED COUNT: 19.9 % (ref 11.6–15.1)
ERYTHROCYTE [DISTWIDTH] IN BLOOD BY AUTOMATED COUNT: 19.9 % (ref 11.6–15.1)
GFR SERPL CREATININE-BSD FRML MDRD: 90 ML/MIN/1.73SQ M
GFR SERPL CREATININE-BSD FRML MDRD: 90 ML/MIN/1.73SQ M
GLUCOSE SERPL-MCNC: 127 MG/DL (ref 65–140)
GLUCOSE SERPL-MCNC: 127 MG/DL (ref 65–140)
GLUCOSE SERPL-MCNC: 89 MG/DL (ref 65–140)
GLUCOSE SERPL-MCNC: 89 MG/DL (ref 65–140)
HCO3 BLDA-SCNC: 25.2 MMOL/L (ref 24–30)
HCO3 BLDA-SCNC: 25.2 MMOL/L (ref 24–30)
HCT VFR BLD AUTO: 31.7 % (ref 36.5–49.3)
HCT VFR BLD AUTO: 31.7 % (ref 36.5–49.3)
HCT VFR BLD CALC: 33 % (ref 36.5–49.3)
HCT VFR BLD CALC: 33 % (ref 36.5–49.3)
HGB BLD-MCNC: 10.2 G/DL (ref 12–17)
HGB BLD-MCNC: 10.2 G/DL (ref 12–17)
HGB BLDA-MCNC: 11.2 G/DL (ref 12–17)
HGB BLDA-MCNC: 11.2 G/DL (ref 12–17)
MCH RBC QN AUTO: 30.6 PG (ref 26.8–34.3)
MCH RBC QN AUTO: 30.6 PG (ref 26.8–34.3)
MCHC RBC AUTO-ENTMCNC: 32.2 G/DL (ref 31.4–37.4)
MCHC RBC AUTO-ENTMCNC: 32.2 G/DL (ref 31.4–37.4)
MCV RBC AUTO: 95 FL (ref 82–98)
MCV RBC AUTO: 95 FL (ref 82–98)
PCO2 BLD: 26 MMOL/L (ref 21–32)
PCO2 BLD: 26 MMOL/L (ref 21–32)
PCO2 BLD: 35.1 MM HG (ref 42–50)
PCO2 BLD: 35.1 MM HG (ref 42–50)
PH BLD: 7.46 [PH] (ref 7.3–7.4)
PH BLD: 7.46 [PH] (ref 7.3–7.4)
PLATELET # BLD AUTO: 79 THOUSANDS/UL (ref 149–390)
PLATELET # BLD AUTO: 79 THOUSANDS/UL (ref 149–390)
PMV BLD AUTO: 11.3 FL (ref 8.9–12.7)
PMV BLD AUTO: 11.3 FL (ref 8.9–12.7)
PO2 BLD: 49 MM HG (ref 35–45)
PO2 BLD: 49 MM HG (ref 35–45)
POTASSIUM BLD-SCNC: 4 MMOL/L (ref 3.5–5.3)
POTASSIUM BLD-SCNC: 4 MMOL/L (ref 3.5–5.3)
POTASSIUM SERPL-SCNC: 3.4 MMOL/L (ref 3.5–5.3)
POTASSIUM SERPL-SCNC: 3.4 MMOL/L (ref 3.5–5.3)
RBC # BLD AUTO: 3.33 MILLION/UL (ref 3.88–5.62)
RBC # BLD AUTO: 3.33 MILLION/UL (ref 3.88–5.62)
RH BLD: POSITIVE
RH BLD: POSITIVE
SAO2 % BLD FROM PO2: 87 % (ref 60–85)
SAO2 % BLD FROM PO2: 87 % (ref 60–85)
SODIUM BLD-SCNC: 140 MMOL/L (ref 136–145)
SODIUM BLD-SCNC: 140 MMOL/L (ref 136–145)
SODIUM SERPL-SCNC: 141 MMOL/L (ref 135–147)
SODIUM SERPL-SCNC: 141 MMOL/L (ref 135–147)
SPECIMEN EXPIRATION DATE: NORMAL
SPECIMEN EXPIRATION DATE: NORMAL
SPECIMEN SOURCE: ABNORMAL
SPECIMEN SOURCE: ABNORMAL
WBC # BLD AUTO: 3.48 THOUSAND/UL (ref 4.31–10.16)
WBC # BLD AUTO: 3.48 THOUSAND/UL (ref 4.31–10.16)

## 2025-05-04 PROCEDURE — 85027 COMPLETE CBC AUTOMATED: CPT

## 2025-05-04 PROCEDURE — 96375 TX/PRO/DX INJ NEW DRUG ADDON: CPT

## 2025-05-04 PROCEDURE — 82803 BLOOD GASES ANY COMBINATION: CPT

## 2025-05-04 PROCEDURE — 86901 BLOOD TYPING SEROLOGIC RH(D): CPT | Performed by: SURGERY

## 2025-05-04 PROCEDURE — 84295 ASSAY OF SERUM SODIUM: CPT

## 2025-05-04 PROCEDURE — 80307 DRUG TEST PRSMV CHEM ANLYZR: CPT

## 2025-05-04 PROCEDURE — 74177 CT ABD & PELVIS W/CONTRAST: CPT

## 2025-05-04 PROCEDURE — 86850 RBC ANTIBODY SCREEN: CPT | Performed by: SURGERY

## 2025-05-04 PROCEDURE — 71260 CT THORAX DX C+: CPT

## 2025-05-04 PROCEDURE — 96374 THER/PROPH/DIAG INJ IV PUSH: CPT

## 2025-05-04 PROCEDURE — 82330 ASSAY OF CALCIUM: CPT

## 2025-05-04 PROCEDURE — 86900 BLOOD TYPING SEROLOGIC ABO: CPT | Performed by: SURGERY

## 2025-05-04 PROCEDURE — 70450 CT HEAD/BRAIN W/O DYE: CPT

## 2025-05-04 PROCEDURE — 36415 COLL VENOUS BLD VENIPUNCTURE: CPT | Performed by: SURGERY

## 2025-05-04 PROCEDURE — 72125 CT NECK SPINE W/O DYE: CPT

## 2025-05-04 PROCEDURE — 90715 TDAP VACCINE 7 YRS/> IM: CPT

## 2025-05-04 PROCEDURE — 93005 ELECTROCARDIOGRAM TRACING: CPT

## 2025-05-04 PROCEDURE — 84132 ASSAY OF SERUM POTASSIUM: CPT

## 2025-05-04 PROCEDURE — 90471 IMMUNIZATION ADMIN: CPT

## 2025-05-04 PROCEDURE — 99205 OFFICE O/P NEW HI 60 MIN: CPT | Performed by: SURGERY

## 2025-05-04 PROCEDURE — 71045 X-RAY EXAM CHEST 1 VIEW: CPT

## 2025-05-04 PROCEDURE — 99285 EMERGENCY DEPT VISIT HI MDM: CPT

## 2025-05-04 PROCEDURE — 93308 TTE F-UP OR LMTD: CPT

## 2025-05-04 PROCEDURE — 76705 ECHO EXAM OF ABDOMEN: CPT

## 2025-05-04 PROCEDURE — 82947 ASSAY GLUCOSE BLOOD QUANT: CPT

## 2025-05-04 PROCEDURE — 85014 HEMATOCRIT: CPT

## 2025-05-04 PROCEDURE — 82550 ASSAY OF CK (CPK): CPT

## 2025-05-04 PROCEDURE — 83735 ASSAY OF MAGNESIUM: CPT

## 2025-05-04 PROCEDURE — 82077 ASSAY SPEC XCP UR&BREATH IA: CPT

## 2025-05-04 PROCEDURE — 80048 BASIC METABOLIC PNL TOTAL CA: CPT

## 2025-05-04 PROCEDURE — 84484 ASSAY OF TROPONIN QUANT: CPT

## 2025-05-04 PROCEDURE — EDAIR PR ED AIR: Performed by: EMERGENCY MEDICINE

## 2025-05-04 RX ORDER — GABAPENTIN 300 MG/1
300 CAPSULE ORAL 3 TIMES DAILY
Status: ON HOLD | COMMUNITY

## 2025-05-04 RX ORDER — FOLIC ACID 1 MG/1
TABLET ORAL DAILY
Status: ON HOLD | COMMUNITY

## 2025-05-04 RX ORDER — NICOTINE 21 MG/24HR
1 PATCH, TRANSDERMAL 24 HOURS TRANSDERMAL EVERY 24 HOURS
Status: ON HOLD | COMMUNITY

## 2025-05-04 RX ORDER — CALCIUM GLUCONATE 20 MG/ML
2 INJECTION, SOLUTION INTRAVENOUS ONCE
Status: COMPLETED | OUTPATIENT
Start: 2025-05-05 | End: 2025-05-05

## 2025-05-04 RX ORDER — ALBUTEROL SULFATE 90 UG/1
2 INHALANT RESPIRATORY (INHALATION) EVERY 6 HOURS PRN
Status: ON HOLD | COMMUNITY

## 2025-05-04 RX ORDER — KETOROLAC TROMETHAMINE 30 MG/ML
15 INJECTION, SOLUTION INTRAMUSCULAR; INTRAVENOUS ONCE
Status: COMPLETED | OUTPATIENT
Start: 2025-05-04 | End: 2025-05-04

## 2025-05-04 RX ORDER — METOPROLOL SUCCINATE 100 MG/1
150 TABLET, EXTENDED RELEASE ORAL 2 TIMES DAILY
COMMUNITY
End: 2025-05-09

## 2025-05-04 RX ORDER — RANOLAZINE 500 MG/1
500 TABLET, EXTENDED RELEASE ORAL 2 TIMES DAILY
Status: ON HOLD | COMMUNITY

## 2025-05-04 RX ORDER — SODIUM CHLORIDE, SODIUM GLUCONATE, SODIUM ACETATE, POTASSIUM CHLORIDE, MAGNESIUM CHLORIDE, SODIUM PHOSPHATE, DIBASIC, AND POTASSIUM PHOSPHATE .53; .5; .37; .037; .03; .012; .00082 G/100ML; G/100ML; G/100ML; G/100ML; G/100ML; G/100ML; G/100ML
500 INJECTION, SOLUTION INTRAVENOUS ONCE
Status: DISCONTINUED | OUTPATIENT
Start: 2025-05-04 | End: 2025-05-04

## 2025-05-04 RX ORDER — DILTIAZEM HYDROCHLORIDE 120 MG/1
120 CAPSULE, EXTENDED RELEASE ORAL 2 TIMES DAILY
Status: ON HOLD | COMMUNITY

## 2025-05-04 RX ORDER — FERROUS SULFATE 325(65) MG
325 TABLET ORAL
Status: ON HOLD | COMMUNITY

## 2025-05-04 RX ORDER — SACCHAROMYCES BOULARDII 250 MG
250 CAPSULE ORAL 2 TIMES DAILY
Status: ON HOLD | COMMUNITY

## 2025-05-04 RX ORDER — GAUZE BANDAGE 2" X 2"
100 BANDAGE TOPICAL DAILY
Status: ON HOLD | COMMUNITY

## 2025-05-04 RX ORDER — SODIUM CHLORIDE, SODIUM GLUCONATE, SODIUM ACETATE, POTASSIUM CHLORIDE, MAGNESIUM CHLORIDE, SODIUM PHOSPHATE, DIBASIC, AND POTASSIUM PHOSPHATE .53; .5; .37; .037; .03; .012; .00082 G/100ML; G/100ML; G/100ML; G/100ML; G/100ML; G/100ML; G/100ML
1000 INJECTION, SOLUTION INTRAVENOUS ONCE
Status: DISCONTINUED | OUTPATIENT
Start: 2025-05-04 | End: 2025-05-04

## 2025-05-04 RX ORDER — POTASSIUM CHLORIDE 1500 MG/1
40 TABLET, EXTENDED RELEASE ORAL ONCE
Status: COMPLETED | OUTPATIENT
Start: 2025-05-05 | End: 2025-05-05

## 2025-05-04 RX ORDER — FLUTICASONE FUROATE AND VILANTEROL 200; 25 UG/1; UG/1
1 POWDER RESPIRATORY (INHALATION) DAILY
Status: ON HOLD | COMMUNITY

## 2025-05-04 RX ORDER — ASPIRIN 81 MG/1
81 TABLET, CHEWABLE ORAL DAILY
Status: ON HOLD | COMMUNITY

## 2025-05-04 RX ORDER — TAMSULOSIN HYDROCHLORIDE 0.4 MG/1
0.4 CAPSULE ORAL
Status: ON HOLD | COMMUNITY

## 2025-05-04 RX ORDER — ACETAMINOPHEN 10 MG/ML
1000 INJECTION, SOLUTION INTRAVENOUS ONCE
Status: COMPLETED | OUTPATIENT
Start: 2025-05-04 | End: 2025-05-04

## 2025-05-04 RX ORDER — PANTOPRAZOLE SODIUM 40 MG/1
40 TABLET, DELAYED RELEASE ORAL DAILY
Status: ON HOLD | COMMUNITY

## 2025-05-04 RX ORDER — ATORVASTATIN CALCIUM 40 MG/1
40 TABLET, FILM COATED ORAL DAILY
Status: ON HOLD | COMMUNITY

## 2025-05-04 RX ORDER — NALTREXONE HYDROCHLORIDE 50 MG/1
50 TABLET, FILM COATED ORAL DAILY
Status: ON HOLD | COMMUNITY

## 2025-05-04 RX ADMIN — TETANUS TOXOID, REDUCED DIPHTHERIA TOXOID AND ACELLULAR PERTUSSIS VACCINE, ADSORBED 0.5 ML: 5; 2.5; 8; 8; 2.5 SUSPENSION INTRAMUSCULAR at 20:52

## 2025-05-04 RX ADMIN — KETOROLAC TROMETHAMINE 15 MG: 30 INJECTION, SOLUTION INTRAMUSCULAR; INTRAVENOUS at 23:35

## 2025-05-04 RX ADMIN — IOHEXOL 100 ML: 350 INJECTION, SOLUTION INTRAVENOUS at 20:08

## 2025-05-04 RX ADMIN — ACETAMINOPHEN 1000 MG: 10 INJECTION INTRAVENOUS at 20:49

## 2025-05-04 RX ADMIN — SODIUM CHLORIDE 1000 ML: 0.9 INJECTION, SOLUTION INTRAVENOUS at 23:48

## 2025-05-05 PROBLEM — J44.9 COPD (CHRONIC OBSTRUCTIVE PULMONARY DISEASE) (HCC): Status: ACTIVE | Noted: 2025-05-05

## 2025-05-05 PROBLEM — R94.31 ABNORMAL EKG: Status: ACTIVE | Noted: 2025-05-05

## 2025-05-05 PROBLEM — D61.818 PANCYTOPENIA (HCC): Status: ACTIVE | Noted: 2025-05-05

## 2025-05-05 PROBLEM — E87.8 ELECTROLYTE ABNORMALITY: Status: ACTIVE | Noted: 2025-05-05

## 2025-05-05 PROBLEM — F10.10 ALCOHOL ABUSE: Status: ACTIVE | Noted: 2025-05-05

## 2025-05-05 PROBLEM — I25.10 CAD (CORONARY ARTERY DISEASE): Status: ACTIVE | Noted: 2025-05-05

## 2025-05-05 PROBLEM — I10 HYPERTENSION: Status: ACTIVE | Noted: 2025-05-05

## 2025-05-05 PROBLEM — I48.91 A-FIB (HCC): Status: ACTIVE | Noted: 2025-05-05

## 2025-05-05 LAB
2HR DELTA HS TROPONIN: -3 NG/L
2HR DELTA HS TROPONIN: -3 NG/L
4HR DELTA HS TROPONIN: -3 NG/L
4HR DELTA HS TROPONIN: -3 NG/L
AMPHETAMINES SERPL QL SCN: NEGATIVE
AMPHETAMINES SERPL QL SCN: NEGATIVE
ANION GAP SERPL CALCULATED.3IONS-SCNC: 9 MMOL/L (ref 4–13)
ANION GAP SERPL CALCULATED.3IONS-SCNC: 9 MMOL/L (ref 4–13)
ATRIAL RATE: 105 BPM
ATRIAL RATE: 105 BPM
ATRIAL RATE: 122 BPM
ATRIAL RATE: 122 BPM
ATRIAL RATE: 174 BPM
ATRIAL RATE: 174 BPM
ATRIAL RATE: 468 BPM
ATRIAL RATE: 468 BPM
BARBITURATES UR QL: NEGATIVE
BARBITURATES UR QL: NEGATIVE
BASOPHILS # BLD AUTO: 0.05 THOUSANDS/ÂΜL (ref 0–0.1)
BASOPHILS # BLD AUTO: 0.05 THOUSANDS/ÂΜL (ref 0–0.1)
BASOPHILS NFR BLD AUTO: 1 % (ref 0–1)
BASOPHILS NFR BLD AUTO: 1 % (ref 0–1)
BENZODIAZ UR QL: NEGATIVE
BENZODIAZ UR QL: NEGATIVE
BUN SERPL-MCNC: 10 MG/DL (ref 5–25)
BUN SERPL-MCNC: 10 MG/DL (ref 5–25)
CA-I BLD-SCNC: 1.01 MMOL/L (ref 1.12–1.32)
CA-I BLD-SCNC: 1.01 MMOL/L (ref 1.12–1.32)
CALCIUM SERPL-MCNC: 7.4 MG/DL (ref 8.4–10.2)
CALCIUM SERPL-MCNC: 7.4 MG/DL (ref 8.4–10.2)
CARDIAC TROPONIN I PNL SERPL HS: 21 NG/L (ref ?–50)
CARDIAC TROPONIN I PNL SERPL HS: 24 NG/L (ref ?–50)
CARDIAC TROPONIN I PNL SERPL HS: 24 NG/L (ref ?–50)
CHLORIDE SERPL-SCNC: 107 MMOL/L (ref 96–108)
CHLORIDE SERPL-SCNC: 107 MMOL/L (ref 96–108)
CK SERPL-CCNC: 83 U/L (ref 39–308)
CK SERPL-CCNC: 83 U/L (ref 39–308)
CO2 SERPL-SCNC: 26 MMOL/L (ref 21–32)
CO2 SERPL-SCNC: 26 MMOL/L (ref 21–32)
COCAINE UR QL: NEGATIVE
COCAINE UR QL: NEGATIVE
CREAT SERPL-MCNC: 0.79 MG/DL (ref 0.6–1.3)
CREAT SERPL-MCNC: 0.79 MG/DL (ref 0.6–1.3)
EOSINOPHIL # BLD AUTO: 0.1 THOUSAND/ÂΜL (ref 0–0.61)
EOSINOPHIL # BLD AUTO: 0.1 THOUSAND/ÂΜL (ref 0–0.61)
EOSINOPHIL NFR BLD AUTO: 3 % (ref 0–6)
EOSINOPHIL NFR BLD AUTO: 3 % (ref 0–6)
ERYTHROCYTE [DISTWIDTH] IN BLOOD BY AUTOMATED COUNT: 19.9 % (ref 11.6–15.1)
ERYTHROCYTE [DISTWIDTH] IN BLOOD BY AUTOMATED COUNT: 19.9 % (ref 11.6–15.1)
EST. AVERAGE GLUCOSE BLD GHB EST-MCNC: 103 MG/DL
EST. AVERAGE GLUCOSE BLD GHB EST-MCNC: 103 MG/DL
ETHANOL SERPL-MCNC: 371 MG/DL
ETHANOL SERPL-MCNC: 371 MG/DL
FENTANYL UR QL SCN: NEGATIVE
FENTANYL UR QL SCN: NEGATIVE
GFR SERPL CREATININE-BSD FRML MDRD: 95 ML/MIN/1.73SQ M
GFR SERPL CREATININE-BSD FRML MDRD: 95 ML/MIN/1.73SQ M
GLUCOSE P FAST SERPL-MCNC: 86 MG/DL (ref 65–99)
GLUCOSE P FAST SERPL-MCNC: 86 MG/DL (ref 65–99)
GLUCOSE SERPL-MCNC: 102 MG/DL (ref 65–140)
GLUCOSE SERPL-MCNC: 102 MG/DL (ref 65–140)
GLUCOSE SERPL-MCNC: 125 MG/DL (ref 65–140)
GLUCOSE SERPL-MCNC: 125 MG/DL (ref 65–140)
GLUCOSE SERPL-MCNC: 136 MG/DL (ref 65–140)
GLUCOSE SERPL-MCNC: 136 MG/DL (ref 65–140)
GLUCOSE SERPL-MCNC: 137 MG/DL (ref 65–140)
GLUCOSE SERPL-MCNC: 137 MG/DL (ref 65–140)
GLUCOSE SERPL-MCNC: 83 MG/DL (ref 65–140)
GLUCOSE SERPL-MCNC: 83 MG/DL (ref 65–140)
GLUCOSE SERPL-MCNC: 86 MG/DL (ref 65–140)
GLUCOSE SERPL-MCNC: 86 MG/DL (ref 65–140)
HBA1C MFR BLD: 5.2 %
HBA1C MFR BLD: 5.2 %
HCT VFR BLD AUTO: 29.1 % (ref 36.5–49.3)
HCT VFR BLD AUTO: 29.1 % (ref 36.5–49.3)
HGB BLD-MCNC: 9.3 G/DL (ref 12–17)
HGB BLD-MCNC: 9.3 G/DL (ref 12–17)
HYDROCODONE UR QL SCN: NEGATIVE
HYDROCODONE UR QL SCN: NEGATIVE
IMM GRANULOCYTES # BLD AUTO: 0.02 THOUSAND/UL (ref 0–0.2)
IMM GRANULOCYTES # BLD AUTO: 0.02 THOUSAND/UL (ref 0–0.2)
IMM GRANULOCYTES NFR BLD AUTO: 1 % (ref 0–2)
IMM GRANULOCYTES NFR BLD AUTO: 1 % (ref 0–2)
LACTATE SERPL-SCNC: 0.9 MMOL/L (ref 0.5–2)
LACTATE SERPL-SCNC: 0.9 MMOL/L (ref 0.5–2)
LYMPHOCYTES # BLD AUTO: 1.35 THOUSANDS/ÂΜL (ref 0.6–4.47)
LYMPHOCYTES # BLD AUTO: 1.35 THOUSANDS/ÂΜL (ref 0.6–4.47)
LYMPHOCYTES NFR BLD AUTO: 39 % (ref 14–44)
LYMPHOCYTES NFR BLD AUTO: 39 % (ref 14–44)
MAGNESIUM SERPL-MCNC: 1.2 MG/DL (ref 1.9–2.7)
MAGNESIUM SERPL-MCNC: 1.2 MG/DL (ref 1.9–2.7)
MAGNESIUM SERPL-MCNC: 2.1 MG/DL (ref 1.9–2.7)
MAGNESIUM SERPL-MCNC: 2.1 MG/DL (ref 1.9–2.7)
MCH RBC QN AUTO: 30.9 PG (ref 26.8–34.3)
MCH RBC QN AUTO: 30.9 PG (ref 26.8–34.3)
MCHC RBC AUTO-ENTMCNC: 32 G/DL (ref 31.4–37.4)
MCHC RBC AUTO-ENTMCNC: 32 G/DL (ref 31.4–37.4)
MCV RBC AUTO: 97 FL (ref 82–98)
MCV RBC AUTO: 97 FL (ref 82–98)
METHADONE UR QL: NEGATIVE
METHADONE UR QL: NEGATIVE
MONOCYTES # BLD AUTO: 0.47 THOUSAND/ÂΜL (ref 0.17–1.22)
MONOCYTES # BLD AUTO: 0.47 THOUSAND/ÂΜL (ref 0.17–1.22)
MONOCYTES NFR BLD AUTO: 14 % (ref 4–12)
MONOCYTES NFR BLD AUTO: 14 % (ref 4–12)
NEUTROPHILS # BLD AUTO: 1.48 THOUSANDS/ÂΜL (ref 1.85–7.62)
NEUTROPHILS # BLD AUTO: 1.48 THOUSANDS/ÂΜL (ref 1.85–7.62)
NEUTS SEG NFR BLD AUTO: 42 % (ref 43–75)
NEUTS SEG NFR BLD AUTO: 42 % (ref 43–75)
NRBC BLD AUTO-RTO: 0 /100 WBCS
NRBC BLD AUTO-RTO: 0 /100 WBCS
OPIATES UR QL SCN: NEGATIVE
OPIATES UR QL SCN: NEGATIVE
OXYCODONE+OXYMORPHONE UR QL SCN: NEGATIVE
OXYCODONE+OXYMORPHONE UR QL SCN: NEGATIVE
PCP UR QL: NEGATIVE
PCP UR QL: NEGATIVE
PLATELET # BLD AUTO: 73 THOUSANDS/UL (ref 149–390)
PLATELET # BLD AUTO: 73 THOUSANDS/UL (ref 149–390)
PMV BLD AUTO: 11.3 FL (ref 8.9–12.7)
PMV BLD AUTO: 11.3 FL (ref 8.9–12.7)
POTASSIUM SERPL-SCNC: 3.9 MMOL/L (ref 3.5–5.3)
POTASSIUM SERPL-SCNC: 3.9 MMOL/L (ref 3.5–5.3)
QRS AXIS: 104 DEGREES
QRS AXIS: 104 DEGREES
QRS AXIS: 105 DEGREES
QRS AXIS: 105 DEGREES
QRS AXIS: 108 DEGREES
QRS AXIS: 108 DEGREES
QRS AXIS: 98 DEGREES
QRS AXIS: 98 DEGREES
QRSD INTERVAL: 90 MS
QRSD INTERVAL: 90 MS
QRSD INTERVAL: 96 MS
QT INTERVAL: 286 MS
QT INTERVAL: 286 MS
QT INTERVAL: 340 MS
QT INTERVAL: 340 MS
QT INTERVAL: 352 MS
QT INTERVAL: 352 MS
QT INTERVAL: 376 MS
QT INTERVAL: 376 MS
QTC INTERVAL: 440 MS
QTC INTERVAL: 440 MS
QTC INTERVAL: 447 MS
QTC INTERVAL: 447 MS
QTC INTERVAL: 454 MS
QTC INTERVAL: 454 MS
QTC INTERVAL: 465 MS
QTC INTERVAL: 465 MS
RBC # BLD AUTO: 3.01 MILLION/UL (ref 3.88–5.62)
RBC # BLD AUTO: 3.01 MILLION/UL (ref 3.88–5.62)
SODIUM SERPL-SCNC: 142 MMOL/L (ref 135–147)
SODIUM SERPL-SCNC: 142 MMOL/L (ref 135–147)
T WAVE AXIS: -31 DEGREES
T WAVE AXIS: -31 DEGREES
T WAVE AXIS: -68 DEGREES
T WAVE AXIS: -68 DEGREES
T WAVE AXIS: 202 DEGREES
T WAVE AXIS: 202 DEGREES
T WAVE AXIS: 215 DEGREES
T WAVE AXIS: 215 DEGREES
THC UR QL: NEGATIVE
THC UR QL: NEGATIVE
VENTRICULAR RATE: 101 BPM
VENTRICULAR RATE: 101 BPM
VENTRICULAR RATE: 159 BPM
VENTRICULAR RATE: 159 BPM
VENTRICULAR RATE: 88 BPM
VENTRICULAR RATE: 88 BPM
VENTRICULAR RATE: 97 BPM
VENTRICULAR RATE: 97 BPM
WBC # BLD AUTO: 3.47 THOUSAND/UL (ref 4.31–10.16)
WBC # BLD AUTO: 3.47 THOUSAND/UL (ref 4.31–10.16)

## 2025-05-05 PROCEDURE — 82948 REAGENT STRIP/BLOOD GLUCOSE: CPT

## 2025-05-05 PROCEDURE — 36415 COLL VENOUS BLD VENIPUNCTURE: CPT

## 2025-05-05 PROCEDURE — 83735 ASSAY OF MAGNESIUM: CPT

## 2025-05-05 PROCEDURE — 82330 ASSAY OF CALCIUM: CPT

## 2025-05-05 PROCEDURE — 93010 ELECTROCARDIOGRAM REPORT: CPT | Performed by: STUDENT IN AN ORGANIZED HEALTH CARE EDUCATION/TRAINING PROGRAM

## 2025-05-05 PROCEDURE — 83605 ASSAY OF LACTIC ACID: CPT

## 2025-05-05 PROCEDURE — 80048 BASIC METABOLIC PNL TOTAL CA: CPT

## 2025-05-05 PROCEDURE — 93005 ELECTROCARDIOGRAM TRACING: CPT

## 2025-05-05 PROCEDURE — 83036 HEMOGLOBIN GLYCOSYLATED A1C: CPT

## 2025-05-05 PROCEDURE — 99232 SBSQ HOSP IP/OBS MODERATE 35: CPT | Performed by: STUDENT IN AN ORGANIZED HEALTH CARE EDUCATION/TRAINING PROGRAM

## 2025-05-05 PROCEDURE — 85025 COMPLETE CBC W/AUTO DIFF WBC: CPT

## 2025-05-05 PROCEDURE — 99223 1ST HOSP IP/OBS HIGH 75: CPT | Performed by: HOSPITALIST

## 2025-05-05 PROCEDURE — 84484 ASSAY OF TROPONIN QUANT: CPT

## 2025-05-05 RX ORDER — ASPIRIN 81 MG/1
81 TABLET, CHEWABLE ORAL DAILY
Status: DISCONTINUED | OUTPATIENT
Start: 2025-05-05 | End: 2025-05-09 | Stop reason: HOSPADM

## 2025-05-05 RX ORDER — GABAPENTIN 300 MG/1
300 CAPSULE ORAL 3 TIMES DAILY
Status: DISCONTINUED | OUTPATIENT
Start: 2025-05-05 | End: 2025-05-09 | Stop reason: HOSPADM

## 2025-05-05 RX ORDER — ALBUTEROL SULFATE 90 UG/1
2 INHALANT RESPIRATORY (INHALATION) EVERY 6 HOURS PRN
Status: DISCONTINUED | OUTPATIENT
Start: 2025-05-05 | End: 2025-05-09 | Stop reason: HOSPADM

## 2025-05-05 RX ORDER — SODIUM CHLORIDE, SODIUM GLUCONATE, SODIUM ACETATE, POTASSIUM CHLORIDE, MAGNESIUM CHLORIDE, SODIUM PHOSPHATE, DIBASIC, AND POTASSIUM PHOSPHATE .53; .5; .37; .037; .03; .012; .00082 G/100ML; G/100ML; G/100ML; G/100ML; G/100ML; G/100ML; G/100ML
100 INJECTION, SOLUTION INTRAVENOUS CONTINUOUS
Status: DISCONTINUED | OUTPATIENT
Start: 2025-05-05 | End: 2025-05-07

## 2025-05-05 RX ORDER — ACETAMINOPHEN 325 MG/1
650 TABLET ORAL ONCE
Status: COMPLETED | OUTPATIENT
Start: 2025-05-05 | End: 2025-05-05

## 2025-05-05 RX ORDER — FLUTICASONE FUROATE AND VILANTEROL 200; 25 UG/1; UG/1
1 POWDER RESPIRATORY (INHALATION) DAILY
Status: DISCONTINUED | OUTPATIENT
Start: 2025-05-05 | End: 2025-05-09 | Stop reason: HOSPADM

## 2025-05-05 RX ORDER — MAGNESIUM SULFATE HEPTAHYDRATE 40 MG/ML
4 INJECTION, SOLUTION INTRAVENOUS ONCE
Status: COMPLETED | OUTPATIENT
Start: 2025-05-05 | End: 2025-05-05

## 2025-05-05 RX ORDER — METOPROLOL SUCCINATE 50 MG/1
150 TABLET, EXTENDED RELEASE ORAL 2 TIMES DAILY
Status: DISCONTINUED | OUTPATIENT
Start: 2025-05-05 | End: 2025-05-05

## 2025-05-05 RX ORDER — NICOTINE 21 MG/24HR
1 PATCH, TRANSDERMAL 24 HOURS TRANSDERMAL EVERY 24 HOURS
Status: DISCONTINUED | OUTPATIENT
Start: 2025-05-05 | End: 2025-05-09 | Stop reason: HOSPADM

## 2025-05-05 RX ORDER — DILTIAZEM HYDROCHLORIDE 60 MG/1
120 CAPSULE, EXTENDED RELEASE ORAL 2 TIMES DAILY
Status: CANCELLED | OUTPATIENT
Start: 2025-05-05

## 2025-05-05 RX ORDER — LORAZEPAM 1 MG/1
2 TABLET ORAL ONCE
Status: COMPLETED | OUTPATIENT
Start: 2025-05-05 | End: 2025-05-05

## 2025-05-05 RX ORDER — FERROUS SULFATE 325(65) MG
325 TABLET ORAL
Status: DISCONTINUED | OUTPATIENT
Start: 2025-05-05 | End: 2025-05-09 | Stop reason: HOSPADM

## 2025-05-05 RX ORDER — PYRIDOXINE HCL (VITAMIN B6) 50 MG
100 TABLET ORAL DAILY
Status: DISCONTINUED | OUTPATIENT
Start: 2025-05-05 | End: 2025-05-09 | Stop reason: HOSPADM

## 2025-05-05 RX ORDER — ATORVASTATIN CALCIUM 40 MG/1
40 TABLET, FILM COATED ORAL DAILY
Status: DISCONTINUED | OUTPATIENT
Start: 2025-05-05 | End: 2025-05-09 | Stop reason: HOSPADM

## 2025-05-05 RX ORDER — DILTIAZEM HYDROCHLORIDE 60 MG/1
120 CAPSULE, EXTENDED RELEASE ORAL 2 TIMES DAILY
Status: DISCONTINUED | OUTPATIENT
Start: 2025-05-05 | End: 2025-05-09 | Stop reason: HOSPADM

## 2025-05-05 RX ORDER — LANOLIN ALCOHOL/MO/W.PET/CERES
400 CREAM (GRAM) TOPICAL DAILY
Status: DISCONTINUED | OUTPATIENT
Start: 2025-05-05 | End: 2025-05-09 | Stop reason: HOSPADM

## 2025-05-05 RX ORDER — TAMSULOSIN HYDROCHLORIDE 0.4 MG/1
0.4 CAPSULE ORAL
Status: DISCONTINUED | OUTPATIENT
Start: 2025-05-05 | End: 2025-05-09 | Stop reason: HOSPADM

## 2025-05-05 RX ORDER — PANTOPRAZOLE SODIUM 40 MG/1
40 TABLET, DELAYED RELEASE ORAL
Status: DISCONTINUED | OUTPATIENT
Start: 2025-05-05 | End: 2025-05-09 | Stop reason: HOSPADM

## 2025-05-05 RX ORDER — MAGNESIUM SULFATE HEPTAHYDRATE 40 MG/ML
2 INJECTION, SOLUTION INTRAVENOUS ONCE
Status: COMPLETED | OUTPATIENT
Start: 2025-05-05 | End: 2025-05-05

## 2025-05-05 RX ORDER — SODIUM CHLORIDE, SODIUM GLUCONATE, SODIUM ACETATE, POTASSIUM CHLORIDE, MAGNESIUM CHLORIDE, SODIUM PHOSPHATE, DIBASIC, AND POTASSIUM PHOSPHATE .53; .5; .37; .037; .03; .012; .00082 G/100ML; G/100ML; G/100ML; G/100ML; G/100ML; G/100ML; G/100ML
500 INJECTION, SOLUTION INTRAVENOUS ONCE
Status: COMPLETED | OUTPATIENT
Start: 2025-05-05 | End: 2025-05-05

## 2025-05-05 RX ORDER — LANOLIN ALCOHOL/MO/W.PET/CERES
100 CREAM (GRAM) TOPICAL DAILY
Status: DISCONTINUED | OUTPATIENT
Start: 2025-05-05 | End: 2025-05-06

## 2025-05-05 RX ORDER — NALTREXONE HYDROCHLORIDE 50 MG/1
50 TABLET, FILM COATED ORAL DAILY
Status: DISCONTINUED | OUTPATIENT
Start: 2025-05-05 | End: 2025-05-09 | Stop reason: HOSPADM

## 2025-05-05 RX ORDER — RANOLAZINE 500 MG/1
500 TABLET, EXTENDED RELEASE ORAL 2 TIMES DAILY
Status: DISCONTINUED | OUTPATIENT
Start: 2025-05-05 | End: 2025-05-09 | Stop reason: HOSPADM

## 2025-05-05 RX ORDER — DILTIAZEM HYDROCHLORIDE 60 MG/1
120 CAPSULE, EXTENDED RELEASE ORAL 2 TIMES DAILY
Status: DISCONTINUED | OUTPATIENT
Start: 2025-05-05 | End: 2025-05-05

## 2025-05-05 RX ORDER — INSULIN LISPRO 100 [IU]/ML
1-6 INJECTION, SOLUTION INTRAVENOUS; SUBCUTANEOUS
Status: DISCONTINUED | OUTPATIENT
Start: 2025-05-05 | End: 2025-05-08

## 2025-05-05 RX ORDER — FOLIC ACID 1 MG/1
1 TABLET ORAL DAILY
Status: DISCONTINUED | OUTPATIENT
Start: 2025-05-05 | End: 2025-05-06

## 2025-05-05 RX ADMIN — GABAPENTIN 300 MG: 300 CAPSULE ORAL at 22:20

## 2025-05-05 RX ADMIN — GABAPENTIN 300 MG: 300 CAPSULE ORAL at 15:43

## 2025-05-05 RX ADMIN — ACETAMINOPHEN 650 MG: 325 TABLET, FILM COATED ORAL at 18:35

## 2025-05-05 RX ADMIN — FERROUS SULFATE TAB 325 MG (65 MG ELEMENTAL FE) 325 MG: 325 (65 FE) TAB at 07:53

## 2025-05-05 RX ADMIN — APIXABAN 5 MG: 5 TABLET, FILM COATED ORAL at 17:58

## 2025-05-05 RX ADMIN — TAMSULOSIN HYDROCHLORIDE 0.4 MG: 0.4 CAPSULE ORAL at 15:43

## 2025-05-05 RX ADMIN — MAGNESIUM SULFATE HEPTAHYDRATE 2 G: 40 INJECTION, SOLUTION INTRAVENOUS at 01:21

## 2025-05-05 RX ADMIN — DILTIAZEM HYDROCHLORIDE 120 MG: 60 CAPSULE, EXTENDED RELEASE ORAL at 10:36

## 2025-05-05 RX ADMIN — PYRIDOXINE HCL TAB 50 MG 100 MG: 50 TAB at 09:11

## 2025-05-05 RX ADMIN — POTASSIUM CHLORIDE 40 MEQ: 1500 TABLET, EXTENDED RELEASE ORAL at 00:21

## 2025-05-05 RX ADMIN — MAGNESIUM SULFATE HEPTAHYDRATE 4 G: 40 INJECTION, SOLUTION INTRAVENOUS at 03:11

## 2025-05-05 RX ADMIN — SODIUM CHLORIDE, SODIUM GLUCONATE, SODIUM ACETATE, POTASSIUM CHLORIDE, MAGNESIUM CHLORIDE, SODIUM PHOSPHATE, DIBASIC, AND POTASSIUM PHOSPHATE 500 ML: .53; .5; .37; .037; .03; .012; .00082 INJECTION, SOLUTION INTRAVENOUS at 02:57

## 2025-05-05 RX ADMIN — Medication 100 MG: at 08:17

## 2025-05-05 RX ADMIN — LORAZEPAM 2 MG: 1 TABLET ORAL at 15:43

## 2025-05-05 RX ADMIN — RANOLAZINE 500 MG: 500 TABLET, FILM COATED, EXTENDED RELEASE ORAL at 17:58

## 2025-05-05 RX ADMIN — GABAPENTIN 300 MG: 300 CAPSULE ORAL at 08:17

## 2025-05-05 RX ADMIN — DILTIAZEM HYDROCHLORIDE 120 MG: 60 CAPSULE, EXTENDED RELEASE ORAL at 22:20

## 2025-05-05 RX ADMIN — ASPIRIN 81 MG CHEWABLE TABLET 81 MG: 81 TABLET CHEWABLE at 08:17

## 2025-05-05 RX ADMIN — METOPROLOL SUCCINATE 150 MG: 50 TABLET, EXTENDED RELEASE ORAL at 09:13

## 2025-05-05 RX ADMIN — SODIUM CHLORIDE, SODIUM GLUCONATE, SODIUM ACETATE, POTASSIUM CHLORIDE, MAGNESIUM CHLORIDE, SODIUM PHOSPHATE, DIBASIC, AND POTASSIUM PHOSPHATE 75 ML/HR: .53; .5; .37; .037; .03; .012; .00082 INJECTION, SOLUTION INTRAVENOUS at 00:57

## 2025-05-05 RX ADMIN — APIXABAN 5 MG: 5 TABLET, FILM COATED ORAL at 08:16

## 2025-05-05 RX ADMIN — ATORVASTATIN CALCIUM 40 MG: 40 TABLET, FILM COATED ORAL at 08:16

## 2025-05-05 RX ADMIN — FLUTICASONE FUROATE AND VILANTEROL TRIFENATATE 1 PUFF: 200; 25 POWDER RESPIRATORY (INHALATION) at 09:11

## 2025-05-05 RX ADMIN — SODIUM CHLORIDE, SODIUM GLUCONATE, SODIUM ACETATE, POTASSIUM CHLORIDE, MAGNESIUM CHLORIDE, SODIUM PHOSPHATE, DIBASIC, AND POTASSIUM PHOSPHATE 75 ML/HR: .53; .5; .37; .037; .03; .012; .00082 INJECTION, SOLUTION INTRAVENOUS at 16:13

## 2025-05-05 RX ADMIN — METOPROLOL SUCCINATE 150 MG: 50 TABLET, EXTENDED RELEASE ORAL at 22:20

## 2025-05-05 RX ADMIN — RANOLAZINE 500 MG: 500 TABLET, FILM COATED, EXTENDED RELEASE ORAL at 09:10

## 2025-05-05 RX ADMIN — FOLIC ACID 1 MG: 1 TABLET ORAL at 08:17

## 2025-05-05 RX ADMIN — NALTREXONE HYDROCHLORIDE 50 MG: 50 TABLET, FILM COATED ORAL at 09:11

## 2025-05-05 RX ADMIN — CALCIUM GLUCONATE 2 G: 20 INJECTION, SOLUTION INTRAVENOUS at 00:20

## 2025-05-05 RX ADMIN — LORAZEPAM 2 MG: 1 TABLET ORAL at 22:20

## 2025-05-05 RX ADMIN — Medication 400 MG: at 09:11

## 2025-05-05 RX ADMIN — NICOTINE 1 PATCH: 21 PATCH, EXTENDED RELEASE TRANSDERMAL at 08:16

## 2025-05-05 NOTE — DISCHARGE SUMMARY
Discharge Summary - Hospitalist   Name: Valentín Can 65 y.o. male I MRN: [id number]  Unit/Bed#: ICU 15 I Date of Admission: 5/4/2025   Date of Service: 5/9/2025 I Hospital Day: 4     Assessment & Plan  Recurrent Clostridioides difficile diarrhea  Pt reports having diarrhea with each episode of binge drinking. However pt reporting new onset nonbloody diarrhea as of 5/7.  Denies abdominal pain. Afebrile, no leucocytosis. Per chart review, pt had been on ABX within the past month.    C. difficile positive. PO vancomycin had been initiated on 5/8.    Plan:  Given patient has had a previous episode of C. difficile per information available in chart (2021), this is the first recurrence of C. difficile.  As such, will treat with the following course:  PO vancomycin 125 mg QID x 14 days  PO vancomycin 125 mg BID x 7 days  PO vancomycin 125 mg QD x 7 days  PO vancomycin 125 mg every other day x 21 days  Florastor probiotics  Abnormal EKG  Presenting s/p fall which occurred at home while intoxicated with head strike currently on aspirin and Plavix  Trauma workup negative  EKG reveals rate controlled A-fib with what appear to be ST depressions in leads II, V3, V4, V5, and V6, these changes appear to be present on EKG done in December however some prior EKGs do not reveal presence of ST changes  Has history with chronic angina, underwent pharmacologic stress testing in 2022 which was negative.  Has history of CAD s/p CABG in 2004 and PCI to the LAD in 2014  Prior echo from 8/2024 revealed EF 65%, with mild to moderate concentric hypertrophy, severe left atrial dilation, mild to moderate tricuspid and mitral valve regurgitation  Initial troponin 24 - trending down  No anginal symptoms including chest pain, shortness of breath, or diaphoresis   Suspect EKG changes could be due to increased demand with combination of A-fib, dehydration, and alcohol intoxication with fall    Plan:  Outpatient f/u with Cardiology -- referral  placed  Fall  Presented s/p unwitnessed fall while intoxicated which occurred at home  Has history of multiple falls while intoxicated requiring admission  Trauma workup negative see plan above  Hypertension  Takes Cardizem 120 mg twice daily, and metoprolol 150 gram twice daily at home  Episodes of hypotension in the ED down to 64/37 with improvement s/p IV fluids  While inpatient, due to hypotension, metoprolol had been adjusted to 75 mg twice daily    Plan:  Decrease home dose to metoprolol succinate 100 mg twice daily  Follow-up with PCP and cardiology  Alcohol abuse  Reports drinking approximately 1 pint of liquor per day  Blood ethanol level 371 in ED  Pt was on CIWA while inpatient  Transferred to ICU for phenobarbital  No recurrent seizure-like activity  Stable  Alcohol cessation education, resources offered, patient declined stating he has resources at home.  Patient states he will follow-up with outpatient services.  Pancytopenia (HCC)  WBC 3.4, hemoglobin 10.2, platelet 79  Appears to be chronic and most likely related to chronic alcoholism  F/u with PCP, obtain repeat CBC.  Alcohol cessation education.  CAD (coronary artery disease)  History of CAD s/p CABG in 2004 and PCI to left main coronary artery in 2014  Continue ASA and statin  A-fib (HCC)  A-fib on Eliquis as well as Cardizem and metoprolol currently rate controlled  Continue anticoagulation, Cardizem, metoprolol  COPD (chronic obstructive pulmonary disease) ([facility])  Continue home albuterol and Breo Ellipta inhalers  Has supplemental oxygen at home which he claims is mostly noncompliant with however uses if needed  Currently on 2L NC saturating well  Continue oxygen supplementation as needed  Abnormal CT scan, cervical spine  Per CT C-spine 5/4/25: Status post extensive cervical laminectomy from C3-C7. There is a thick-walled fluid collection at the surgical site posteriorly within the neck that measures 2.0 x 3.6 cm on image 54 of series 305. This  is suggestive of a seroma or pseudomeningocele.  This is not present on previous CT C-spines from April/March/February, etc. However it is present on a CT scan from 2021. Patient currently asymptomatic, denies cervical issues, including tenderness/pain and no associated deficits.  Afebrile.  Likely benign.  Reached out to on-call inpatient neurosurgery. [place] following up with outpatient NSGY given history of laminectomies; referral placed.     Medical Problems       Resolved Problems  Date Reviewed: 5/5/2025   None       Discharging Physician / Practitioner: Maria Fernanda Skinner DO  PCP: Lesly Rivera MD  Admission Date:   Admission Orders (From admission, onward)       Ordered        05/05/25 1543  INPATIENT ADMISSION  Once            05/04/25 2340  Place in Observation  Once                          Discharge Date: 05/09/25    Consultations During Hospital Stay:  N/A    Procedures Performed:   N/A    Imaging Findings / Significant Test Results:   XR trauma 5/4/25: No acute cardiopulmonary disease within limitations of supine imaging. Age-indeterminate fracture of the left anterior sixth rib. Old right 10th and 11th rib fractures.  CT head wo contrast 5/4/25: No acute intracranial abnormality.  CT C-spine wo contrast 5/4/25: No cervical spine fracture or traumatic malalignment. Extensive anterior and posterior cervical spine fusion as described above. Slightly thick-walled fluid collection at the laminectomy site in the posterior neck that may represent a seroma or pseudomeningocele. Possible minimal loosening of the C6 pedicle screws.  CT chest/abd/pelvis wo contrast 5/4/25: Multiple healing bilateral rib fractures. Minimally displaced fracture of the left transverse process of L1 that may be acute or chronic. Diffuse fatty infiltration of the liver. Bilateral adrenal gland nodules. Follow-up adrenal mass protocol CT or MRI is recommended in 1 year. Probable pulmonary hypertension. Mild diffuse thickening of the colonic  wall that is incompletely distended. Colitis not excluded.     Incidental Findings:   Per CT chest/abd/pelvis wo contrast 5/4/25   Bilateral adrenal gland nodules. Nodule in the lateral limb of the right adrenal gland measures 1.3 x 1.8 cm image 133 of series 309. Nodule within the left adrenal gland measures 1.4 x 2.0 cm image 126 of series 309.    Follow-up adrenal mass protocol CT or MRI is recommended in 1 year.  I reviewed the above mentioned incidental findings with the patient and/or family and they expressed understanding.   ICU team also reviewed these findings with the patient. Included in AVS as well.     Test Results Pending at Discharge (will require follow up):   N/A     Outpatient Tests Requested:  CBC  BMP  Adrenal mass protocol CT/MRI in 1 year    Reason for Admission: Abnormal EKG, alcohol withdrawal, diarrhea    Hospital Course:   Valentín Can is a 65 y.o. male patient who originally presented to the hospital on 5/4/2025 due to an unwitnessed fall. He had been consuming approximately one pint of liquor per day, stating he was intoxicated, lost his balance, and had a posterior head strike. He initially presented as a trauma alert. Trauma workup was largely unremarkable. Please see above for imaging specifics.  He was also noted to be hypotensive in the ED requiring IV fluids. In the ED, EKG showed rate controlled atrial fibrillation with ST depressions in leads II, V3 through V6.  Initial troponin was 24.  Although patient denied anginal symptoms, he was admitted for further workup/monitoring given his extensive cardiac history, including CAD, HLD, atrial fibrillation, hypertension, LA dilation.  Throughout the course of his hospitalization, electrolyte abnormalities were replenished as necessary.  Patient was placed on CIWA and given Ativan as necessary.  On 5/7, patient was reported to have seizure-like activity with tonic-clonic movements, and as such a rapid response had been called.  He was  subsequently transferred to the ICU for closer monitoring and phenobarbital administration.  While in the ICU, his CIWA scores were also monitored.  As of 5/8, patient's CIWA scores had been at 4-5 since earlier in the morning, and he had not required phenobarbital for over 24 hours at that point.  He did not have any recurrence of seizure-like activity.  On 5/9, patient was transferred to stepdown level of care as he no longer had any critical needs and was largely stable/at his baseline.    Additionally, on 5/7, patient was noted to have diarrhea, which patient stated was due to his alcohol use.  However, given patient's recent antibiotic use within the past month, as well as history of C. difficile infection in the past, a C. difficile test was obtained, which resulted positive.  As such, patient was initiated on p.o. vancomycin.      Patient was also evaluated by PT/OT, who recommended level III minimum intensity.  Per CM, patient was offered home health care which he denied.  Per CM, patient confirmed he planned to go to outpatient treatment for alcohol use; denied other resources at this time.  As the patient approaches baseline, the multidisciplinary team felt he could be safely discharged with outpatient follow-up.  The importance of medication adherence and outpatient follow-up was reiterated to the patient.      Please see above list of diagnoses and related plan for additional information.     Condition at Discharge: stable    Discharge Day Visit / Exam:   Subjective:  Pt seen and evaluated at bedside. Pt denies any chest pain, shortness of breath, palpitations. Pt reports his diarrhea has improved, and that he had one episode of soft stool yesterday evening. Denying weakness/fevers/chills. Denying tremulousness/diaphoresis. He is aware of his current medication regimen and plan.   Vitals: Blood Pressure: 131/85 (05/09/25 0758)  Pulse: 69 (05/09/25 0700)  Temperature: 97.7 °F (36.5 °C) (05/09/25  "0700)  Temp Source: Oral (05/09/25 0700)  Respirations: 18 (05/09/25 0700)  Height: 6' 2\" (188 cm) (05/07/25 0900)  Weight - Scale: 86 kg (189 lb 9.5 oz) (05/08/25 0240)  SpO2: 94 % (05/09/25 0700)  Physical Exam  Vitals and nursing note reviewed.   Constitutional:       General: He is not in acute distress.     Appearance: He is well-developed.   HENT:      Head: Normocephalic.      Right Ear: External ear normal.      Left Ear: External ear normal.   Eyes:      Extraocular Movements: Extraocular movements intact.      Conjunctiva/sclera: Conjunctivae normal.   Cardiovascular:      Rate and Rhythm: Normal rate and regular rhythm.      Heart sounds: No murmur heard.  Pulmonary:      Effort: Pulmonary effort is normal. No respiratory distress.      Breath sounds: Normal breath sounds.   Chest:      Chest wall: No tenderness.   Abdominal:      Palpations: Abdomen is soft.      Tenderness: There is no abdominal tenderness.   Musculoskeletal:         General: No swelling.      Cervical back: Neck supple.   Skin:     General: Skin is warm and dry.      Capillary Refill: Capillary refill takes less than 2 seconds.      Findings: Bruising present.   Neurological:      Mental Status: He is alert and oriented to person, place, and time.   Psychiatric:         Mood and Affect: Mood normal.        Discussion with Family: Patient declined call to .     Discharge instructions/Information to patient and family:   See after visit summary for information provided to patient and family.      Provisions for Follow-Up Care:  See after visit summary for information related to follow-up care and any pertinent home health orders.      Mobility at time of Discharge:   Basic Mobility Inpatient Raw Score: 17  JH-HLM Goal: 5: Stand one or more mins  JH-HLM Achieved: 2: Bed activities/Dependent transferHLM Goal NOT achieved. Continue to encourage mobility in post discharge setting.     Disposition:   Home    Planned Readmission: " No    Discharge Medications:  See after visit summary for reconciled discharge medications provided to patient and/or family.      Administrative Statements   Discharge Statement:  I have spent a total time of 60 minutes in caring for this patient on the day of the visit/encounter. >30 minutes of time was spent on: Diagnostic results, Prognosis, Risks and benefits of tx options, Instructions for management, Patient and family education, Importance of tx compliance, Risk factor reductions, Impressions, Counseling / Coordination of care, Documenting in the medical record, Reviewing / ordering tests, medicine, procedures  , and Communicating with other healthcare professionals .    **Please Note: This note may have been constructed using a voice recognition system**

## 2025-05-05 NOTE — H&P
H&P - Trauma   Name: Valentín Can 65 y.o. male I MRN: [id number]  Unit/Bed#: ED-05 I Date of Admission: 5/4/2025   Date of Service: 5/4/2025 I Hospital Day: 0     Assessment & Plan  Fall  - Unwitnessed fall with head strike  - CT head/c-spine negative for acute traumatic injuries  - CT CAP: Minimally displaced fracture of the left transverse process of L1 that may be acute or chronic.    - Patient without back pain or tenderness on exam   - Favor fracture to be chronic  - Tylenol and Toradol for headache  - Patient tolerating a regular diet  - Patient with an episode of hypotension, SBP in the 70s-80s manually   - 1L normal saline bolus with good response in BP - now 125/75  - EKG obtained and demonstrates T wave inversions in leads II, V3, V4, V5, and V6 - new since EKG on 5/2  - Admit to internal medicine team for further workup  - Trauma will follow for a tertiary exam    Trauma Alert: Level A   Model of Arrival: Ambulance    Trauma Team: Attending Chace and DIANDRA Jimenez  Consultants:     None     History of Present Illness   Chief Complaint: My head hurts  Mechanism:Fall     Valentín Can is a 65 y.o. male who presents status post fall.  Per EMS, the patient suffered fall with unknown head strike.  His neighbor reportedly heard him yelling for help and called EMS. The patient was found alert on the floor. He reportedly was drinking alcohol which caused him to lose his balance and fall. He presented to the trauma bay [facility] 15 only complaining of head pain.     Review of Systems   Constitutional:  Negative for chills and fever.   HENT:  Negative for ear pain and sore throat.    Eyes:  Negative for pain and visual disturbance.   Respiratory:  Negative for cough and shortness of breath.    Cardiovascular:  Negative for chest pain and palpitations.   Gastrointestinal:  Negative for abdominal pain and vomiting.   Genitourinary:  Negative for dysuria and hematuria.   Musculoskeletal:  Negative for arthralgias  and back pain.   Skin:  Positive for wound. Negative for color change and rash.   Neurological:  Positive for headaches. Negative for seizures and syncope.   All other systems reviewed and are negative.    Medical History Review: I have reviewed the patient's PMH, PSH, Social History, Family History, Meds, and Allergies     There is no immunization history on file for this patient.  Last Tetanus: update today         Objective :  Temp:  [97.5 °F (36.4 °C)] 97.5 °F (36.4 °C)  HR:  [89-98] 95  BP: (116-125)/(68-76) 117/75  Resp:  [20] 20  SpO2:  [91 %-99 %] 99 %  O2 Device: Nasal cannula    Initial Vitals:   Temperature: 97.5 °F (36.4 °C) (05/04/25 2000)  Pulse: 98 (05/04/25 1953)  Respirations: 20 (05/04/25 1953)  Blood Pressure: 125/76 (05/04/25 1953)    Primary Survey:   Airway:        Status: patent;        Pre-hospital Interventions: none        Hospital Interventions: none  Breathing:        Pre-hospital Interventions: none       Effort: normal       Right breath sounds: normal       Left breath sounds: normal  Circulation:        Rhythm: regular       Rate: regular   Right Pulses Left Pulses    R radial: 2+  R femoral: 2+  R pedal: 2+     L radial: 2+  L femoral: 2+  L pedal: 2+       Disability:        GCS: Eye: 4; Verbal: 4 Motor: 6 Total: 14       Right Pupil: round;  reactive         Left Pupil:  round;  reactive      R Motor Strength L Motor Strength    R : 5/5  R dorsiflex: 5/5  R plantarflex: 5/5 L : 5/5  L dorsiflex: 5/5  L plantarflex: 5/5        Sensory:  No sensory deficit  Exposure:       Completed: Yes      Secondary Survey:  Physical Exam  Constitutional:       General: He is not in acute distress.     Interventions: Cervical collar in place.   HENT:      Head: Normocephalic.      Comments: Right occipital hematoma with overlying abrasion     Right Ear: External ear normal.      Left Ear: External ear normal.      Nose: Nose normal.      Mouth/Throat:      Mouth: Mucous membranes are  moist.   Eyes:      Extraocular Movements: Extraocular movements intact.      Pupils: Pupils are equal, round, and reactive to light.   Cardiovascular:      Rate and Rhythm: Normal rate and regular rhythm.      Pulses: Normal pulses.      Heart sounds: Normal heart sounds.   Pulmonary:      Effort: Pulmonary effort is normal. No respiratory distress.      Breath sounds: Normal breath sounds.      Comments: Bruising of right flank  Chest:      Chest wall: No tenderness.   Abdominal:      General: Abdomen is flat. There is no distension.      Palpations: Abdomen is soft.      Tenderness: There is no abdominal tenderness. There is no guarding.   Musculoskeletal:         General: No tenderness or signs of injury. Normal range of motion.      Cervical back: No deformity or tenderness.      Thoracic back: No deformity or tenderness.      Lumbar back: No deformity or tenderness.   Skin:     General: Skin is warm.      Capillary Refill: Capillary refill takes less than 2 seconds.      Findings: Bruising (bruising on all extremities in various stages of healing, bruising of right side of back) present.      Comments: Abrasions on all extremities, all old appearing   Neurological:      General: No focal deficit present.      Mental Status: He is alert. Mental status is at baseline.      Sensory: No sensory deficit.      Motor: No weakness.             Lab Results: I have reviewed the following results:  Recent Labs     05/04/25 2003   HGB 11.2*   HCT 33*   CO2 26   CAIONIZED 0.93*       Imaging Results: I have personally reviewed pertinent images saved in PACS. CT scan findings (and other pertinent positive findings on images) were discussed with radiology. My interpretation of the images/reports are as follows:  Chest Xray(s): negative for acute findings   FAST exam(s): negative for acute findings   CT Scan(s): negative for acute findings   Additional Xray(s): N/A     Other Studies: Other Study Results Review: EKG was  reviewed.

## 2025-05-05 NOTE — ED PROVIDER NOTES
Emergency Department Airway Evaluation and Management Form    History  Obtained from: EMS/patient  Patient has no allergy information on record.  No chief complaint on file.    HPI    65-year-old male brought in by EMS after being found down on the ground intoxicated in or near his apartment.  Takes Eliquis.  He arrives awake and alert breathing comfortably on room air with normal saturation.  No acute airway intervention needed.  Further management per trauma team.    No past medical history on file.  No past surgical history on file.  No family history on file.     I have reviewed and agree with the history as documented.    Review of Systems    Physical Exam  /76   Pulse 98   Resp 20   Wt 81 kg (178 lb 9.2 oz)   SpO2 97%     Physical Exam    ED Medications  Medications - No data to display    Intubation  Procedures    Notes      Final Diagnosis  Final diagnoses:   None       ED Provider  Electronically Signed by     Ronny Monique MD  05/04/25 2001

## 2025-05-05 NOTE — ASSESSMENT & PLAN NOTE
Continue home albuterol and Breo Ellipta inhalers while inpatient   Has supplemental oxygen at home which he claims is mostly noncompliant with however uses if needed  Currently on 2L NC saturating well  Continue oxygen supplementation as needed

## 2025-05-05 NOTE — ASSESSMENT & PLAN NOTE
Takes Cardizem 120 mg twice daily, and metoprolol 150 gram twice daily at home  Episodes of hypotension in the ED down to 64/37 with improvement s/p IV fluids    Plan:  Hold antihypertensives for now until stabilization of BP's  S/p bolus IV fluids, will continue with maintenance IV fluids

## 2025-05-05 NOTE — PROGRESS NOTES
Progress Note - Trauma   Name: Valentín Can 65 y.o. male I MRN: [id number]  Unit/Bed#: ED-05 I Date of Admission: 5/4/2025   Date of Service: 5/5/2025 I Hospital Day: 0    Assessment & Plan  Fall  - Unwitnessed fall with head strike  - CT head/c-spine negative for acute traumatic injuries  - CT CAP: Minimally displaced fracture of the left transverse process of L1 that may be acute or chronic.    - Patient without back pain or tenderness on exam   - Favor fracture to be chronic  - Tylenol and Toradol for headache  - Patient tolerating a regular diet  - Patient with an episode of hypotension, SBP in the 70s-80s manually   - 1L normal saline bolus with good response in BP - now 125/75  - EKG obtained and demonstrates T wave inversions in leads II, V3, V4, V5, and V6 - new since EKG on 5/2  - Admit to internal medicine team for further workup  - Anticoagulated with Eliquis  - No further trauma workup indicated  - Rest of care per primary team  Abnormal EKG      VTE Prophylaxis: VTE covered by:  apixaban, Oral        Disposition:  Ok for discharge from Trauma service perspective.  Trauma service signing off.  Please contact the SecureChat role for the Trauma service with any questions/concerns.    TRAUMA TERTIARY SURVEY  Summary of Diagnosed Injuries: No acute traumatic injuries identified    Transfer from: N/A    Mechanism of Injury:Fall     Chief Complaint: Headache    24 Hour Events : Patient was evaluated by the trauma team after a fall. No acute traumatic injuries identified. EKG notable for new T wave inversions in multiple leads. Admitted to internal medicine for further work up.   Subjective : Patient continues to complain of headache but otherwise denies complaints at this time.  He denies any pain and is resting comfortably in bed.    Objective :  Temp:  [97.5 °F (36.4 °C)] 97.5 °F (36.4 °C)  HR:  [] 94  BP: ()/(31-85) 124/63  Resp:  [16-20] 16  SpO2:  [80 %-100 %] 95 %  O2 Device: Nasal  cannula  Nasal Cannula O2 Flow Rate (L/min):  [2 L/min] 2 L/min    I/O         05/03 0701  05/04 0700 05/04 0701  05/05 0700    IV Piggyback  1250    Total Intake(mL/kg)  1250 (15.4)    Net  +1250                  Physical Exam  Vitals and nursing note reviewed.   Constitutional:       General: He is not in acute distress.     Appearance: He is well-developed.   HENT:      Head: Normocephalic.      Comments: Posterior scalp hematoma with overlying abrasion  Eyes:      Extraocular Movements: Extraocular movements intact.      Conjunctiva/sclera: Conjunctivae normal.   Cardiovascular:      Rate and Rhythm: Normal rate and regular rhythm.      Pulses: Normal pulses.      Heart sounds: Normal heart sounds.   Pulmonary:      Effort: Pulmonary effort is normal. No respiratory distress.      Breath sounds: Normal breath sounds.   Chest:      Chest wall: No tenderness.   Abdominal:      General: Abdomen is flat. There is no distension.      Palpations: Abdomen is soft.      Tenderness: There is no abdominal tenderness. There is no guarding.   Musculoskeletal:         General: No swelling, tenderness, deformity or signs of injury. Normal range of motion.      Cervical back: Normal range of motion and neck supple.   Skin:     General: Skin is warm and dry.      Capillary Refill: Capillary refill takes less than 2 seconds.      Findings: Bruising (Bruising in multiple stages along all 4 extremities and the right side of the back/flank) present.   Neurological:      General: No focal deficit present.      Mental Status: He is alert and oriented to person, place, and time. Mental status is at baseline.      Sensory: No sensory deficit.      Motor: No weakness.   Psychiatric:         Mood and Affect: Mood normal.         Behavior: Behavior normal.               Lab Results: I have reviewed the following results:  Recent Labs     05/04/25 2003 05/04/25  2328 05/04/25  2346 05/05/25  0131 05/05/25  0224   WBC  --  3.48*  --   --    --    HGB 11.2* 10.2*  --   --   --    HCT 33* 31.7*  --   --   --    PLT  --  79*  --   --   --    SODIUM  --  141  --   --   --    K  --  3.4*  --   --   --    CL  --  103  --   --   --    CO2 26 29  --   --   --    BUN  --  10  --   --   --    CREATININE  --  0.90  --   --   --    GLUC  --  127  --   --   --    CAIONIZED 0.93*  --   --   --   --    MG  --   --  1.2*  --   --    HSTNI0  --  24  --   --   --    HSTNI2  --   --   --  21  --    LACTICACID  --   --   --   --  0.9       Imaging Results Review: I reviewed radiology reports from this admission including: chest xray, CT chest, CT abdomen/pelvis, CT head, and CT C-spine.  Other Study Results Review: EKG was reviewed.

## 2025-05-05 NOTE — ASSESSMENT & PLAN NOTE
Presenting s/p fall which occurred at home while intoxicated with head strike currently on aspirin and Plavix  Trauma workup negative  EKG reveals rate controlled A-fib with what appear to be ST depressions in leads II, V3, V4, V5, and V6, these changes appear to be present on EKG done in December however some prior EKGs do not reveal presence of ST changes  Has history with chronic angina, underwent pharmacologic stress testing in 2022 which was negative.  Has history of CAD s/p CABG in 2004 and PCI to the LAD in 2014  Prior echo from 8/2024 revealed EF 65%, with mild to moderate concentric hypertrophy, severe left atrial dilation, mild to moderate tricuspid and mitral valve regurgitation  Initial troponin 24 - trending down  No anginal symptoms including chest pain, shortness of breath, or diaphoresis   Suspect EKG changes could be due to increased demand with combination of A-fib, dehydration, and alcohol intoxication with fall    Plan:  Outpatient f/u with Cardiology -- referral placed

## 2025-05-05 NOTE — ASSESSMENT & PLAN NOTE
Presented s/p unwitnessed fall while intoxicated which occurred at home  Has history of multiple falls while intoxicated requiring admission  Trauma workup negative see plan above

## 2025-05-05 NOTE — ASSESSMENT & PLAN NOTE
A-fib on Eliquis as well as Cardizem and metoprolol currently rate controlled    Plan:  Continue Eliquis, hold Cardizem and metoprolol given low blood pressures

## 2025-05-05 NOTE — ASSESSMENT & PLAN NOTE
- Unwitnessed fall with head strike  - CT head/c-spine negative for acute traumatic injuries  - CT CAP: Minimally displaced fracture of the left transverse process of L1 that may be acute or chronic.    - Patient without back pain or tenderness on exam   - Favor fracture to be chronic  - Tylenol and Toradol for headache  - Patient tolerating a regular diet  - Patient with an episode of hypotension, SBP in the 70s-80s manually   - 1L normal saline bolus with good response in BP - now 125/75  - EKG obtained and demonstrates T wave inversions in leads II, V3, V4, V5, and V6 - new since EKG on 5/2  - Admit to internal medicine team for further workup  - Anticoagulated with Eliquis  - No further trauma workup indicated  - Rest of care per primary team

## 2025-05-05 NOTE — ASSESSMENT & PLAN NOTE
Takes Cardizem 120 mg twice daily, and metoprolol 150 gram twice daily at home  Episodes of hypotension in the ED down to 64/37 with improvement s/p IV fluids  While inpatient, due to hypotension, metoprolol had been adjusted to 75 mg twice daily    Plan:  Decrease home dose to metoprolol succinate 100 mg twice daily  Follow-up with PCP and cardiology

## 2025-05-05 NOTE — ASSESSMENT & PLAN NOTE
Continue home albuterol and Breo Ellipta inhalers  Has supplemental oxygen at home which he claims is mostly noncompliant with however uses if needed  Currently on 2L NC saturating well  Continue oxygen supplementation as needed

## 2025-05-05 NOTE — ASSESSMENT & PLAN NOTE
WBC 3.4, hemoglobin 10.2, platelet 79  Appears to be chronic and most likely related to chronic alcoholism  F/u with PCP, obtain repeat CBC.  Alcohol cessation education.   Closure 2 Information: This tab is for additional flaps and grafts, including complex repair and grafts and complex repair and flaps. You can also specify a different location for the additional defect, if the location is the same you do not need to select a new one. We will insert the automated text for the repair you select below just as we do for solitary flaps and grafts. Please note that at this time if you select a location with a different insurance zone you will need to override the ICD10 and CPT if appropriate.

## 2025-05-05 NOTE — ASSESSMENT & PLAN NOTE
- Unwitnessed fall with head strike  - CT head/c-spine negative for acute traumatic injuries  - CT CAP: Minimally displaced fracture of the left transverse process of L1 that may be acute or chronic.    - Patient without back pain or tenderness on exam   - Favor fracture to be chronic  - Tylenol and Toradol for headache  - Patient tolerating a regular diet  - Patient with an episode of hypotension, SBP in the 70s-80s manually   - 1L normal saline bolus with good response in BP - now 125/75  - EKG obtained and demonstrates T wave inversions in leads II, V3, V4, V5, and V6 - new since EKG on 5/2  - Admit to internal medicine team for further workup  - Trauma will follow for a tertiary exam

## 2025-05-05 NOTE — ASSESSMENT & PLAN NOTE
A-fib on Eliquis as well as Cardizem and metoprolol currently rate controlled  Continue anticoagulation, Cardizem, metoprolol

## 2025-05-05 NOTE — ASSESSMENT & PLAN NOTE
Admits to drinking approximately 1 pint of liquor per day  Blood ethanol level 371  CIWA protocol while inpatient

## 2025-05-05 NOTE — ASSESSMENT & PLAN NOTE
History of CAD s/p CABG in 2004 and PCI to left main coronary artery in 2014  Continue ASA and statin

## 2025-05-05 NOTE — ASSESSMENT & PLAN NOTE
Reports drinking approximately 1 pint of liquor per day  Blood ethanol level 371 in ED  Pt was on CIWA while inpatient  Transferred to ICU for phenobarbital  No recurrent seizure-like activity  Stable  Alcohol cessation education, resources offered, patient declined stating he has resources at home.  Patient states he will follow-up with outpatient services.

## 2025-05-05 NOTE — H&P
H&P - Hospitalist   Name: Valentín Can 65 y.o. male I MRN: [id number]  Unit/Bed#: ED-05 I Date of Admission: 5/4/2025   Date of Service: 5/5/2025 I Hospital Day: 0     Assessment & Plan  Abnormal EKG  Presenting s/p fall which occurred at home while intoxicated with head strike currently on aspirin and Plavix  Trauma workup negative  EKG reveals rate controlled A-fib with what appear to be ST depressions in leads II, V3, V4, V5, and V6, these changes appear to be present on EKG done in December however some prior EKGs do not reveal presence of ST changes  Has history with chronic angina, underwent pharmacologic stress testing in 2022 which was negative.  Has history of CAD s/p CABG in 2004 and PCI to the LAD in 2014  Initial troponin 24  Currently no symptoms of ACS such as chest pain, shortness of breath, or diaphoresis   Suspect EKG changes could be due to increased demand with combination of A-fib, dehydration, and alcohol intoxication with fall    Plan:  Continue to trend troponin  Monitor on telemetry  Prior echo from 8/2024 revealed EF 65%, with mild to moderate concentric hypertrophy, severe left atrial dilation, mild to moderate tricuspid and mitral valve regurgitation  Will hold off on ECHO now   Fall  Presented s/p unwitnessed fall while intoxicated which occurred at home  Has history of multiple falls while intoxicated requiring admission  Trauma workup negative see plan above  Electrolyte abnormality  Multi electrolyte abnormality on admission:   Potassium 3.4  Calcium 7.3  Magnesium 1.2  Replenished in the ED    Plan:  Monitor daily electrolytes, replenish as needed  Alcohol abuse  Admits to drinking approximately 1 pint of liquor per day  Blood ethanol level 371  CIWA protocol while inpatient  Pancytopenia (HCC)  WBC 3.4, hemoglobin 10.2, platelet 79  Appears to be chronic and most likely related to chronic alcoholism  Monitor daily CBC  Hypertension  Takes Cardizem 120 mg twice daily, and  metoprolol 150 gram twice daily at home  Episodes of hypotension in the ED down to 64/37 with improvement s/p IV fluids    Plan:  Hold antihypertensives for now until stabilization of BP's  S/p bolus IV fluids, will continue with maintenance IV fluids    CAD (coronary artery disease)  History of CAD s/p CABG in 2004 and PCI to left main coronary artery in 2014  Currently on aspirin    Plan:  Continue aspirin while inpatient, see plan above under abnormal EKG  A-fib ([facility])  A-fib on Eliquis as well as Cardizem and metoprolol currently rate controlled    Plan:  Continue Eliquis, hold Cardizem and metoprolol given low blood pressures  COPD (chronic obstructive pulmonary disease) ([facility])  Continue home albuterol and Breo Ellipta inhalers while inpatient   Has supplemental oxygen at home which he claims is mostly noncompliant with however uses if needed  Currently on 2L NC saturating well  Continue oxygen supplementation as needed      VTE Pharmacologic Prophylaxis: VTE Score: 5 High Risk (Score >/= 5) - Pharmacological DVT Prophylaxis Ordered: apixaban (Eliquis). Sequential Compression Devices Ordered.  Code Status: Level 1 - Full Code discussed with patient   Discussion with family: Patient declined call to .     Anticipated Length of Stay: Patient will be admitted on an inpatient basis with an anticipated length of stay of greater than 2 midnights secondary to abnormal EKG.    History of Present Illness   Chief Complaint: Andrea Can is a 65 y.o. male with a PMH of A-fib on Eliquis, CAD s/p CABG in 2004, and PCI to the left main coronary artery in 2014, COPD, alcohol abuse, HFpEF, HTN, GERD, alcoholic fatty liver disease who presents to the ED s/p unwitnessed fall which occurred at his apartment while intoxicated.  He admits to drinking approximately 1 pint of liquor per day.  Patient allegedly fell as he lost his balance walking around his apartment, and struck the posterior aspect of his  head.  He remembers lying on the ground with blood present on the floor yelling for help.  His neighbor had heard him and alerted EMS who brought patient in for further evaluation.  Patient has history of multiple falls while intoxicated, he lives in his apartment alone.  He denied any symptoms prior to the fall such as chest pain, shortness of breath, dizziness, or diaphoresis.  On my evaluation in the ED he is complaining of a headache primarily located where he had struck his head on the posterior aspect.  He denies any visual changes, focal weakness, or numbness or tingling.  He was initially evaluated by trauma services in the ED, and trauma workup was negative.  EKG had revealed ST depressions in leads II, V3, V4, V5, and V6 and was admitted to [facility] for further workup.     Review of Systems   Unable to perform ROS: Other   Intoxicated     Historical Information   No past medical history on file.  No past surgical history on file.  Social History     Tobacco Use    Smoking status: Not on file    Smokeless tobacco: Not on file   Substance and Sexual Activity    Alcohol use: Not on file    Drug use: Not on file    Sexual activity: Not on file     No existing history information found.  No existing history information found.    Social History:  Marital Status: Single   Occupation: N/A  Patient Pre-hospital Living Situation: Home  Patient Pre-hospital Level of Mobility: walks  Patient Pre-hospital Diet Restrictions: None    Meds/Allergies   I have reviewed home medications using recent Epic encounter.  Prior to Admission medications    Medication Sig Start Date End Date Taking? Authorizing Provider   albuterol (PROVENTIL HFA,VENTOLIN HFA) 90 mcg/act inhaler Inhale 2 puffs every 6 (six) hours as needed for wheezing   Yes Historical Provider, MD   aluminum-magnesium hydroxide 200-200 MG/5ML suspension Take 5 mL by mouth every 6 (six) hours as needed for heartburn   Yes Historical Provider, MD   apixaban (Eliquis) 5  mg Take 5 mg by mouth 2 (two) times a day   Yes Historical Provider, MD   aspirin 81 mg chewable tablet Chew 81 mg daily   Yes Historical Provider, MD   atorvastatin (LIPITOR) 40 mg tablet Take 40 mg by mouth daily   Yes Historical Provider, MD   diltiazem (CARDIZEM SR) 120 mg 12 hr capsule Take 120 mg by mouth 2 (two) times a day   Yes Historical Provider, MD   ferrous sulfate 325 (65 Fe) mg tablet Take 325 mg by mouth daily with breakfast   Yes Historical Provider, MD   fluticasone-vilanterol 200-25 mcg/actuation inhaler Inhale 1 puff daily Rinse mouth after use.   Yes Historical Provider, MD   folic acid (FOLVITE) 1 mg tablet Take by mouth daily   Yes Historical Provider, MD   gabapentin (NEURONTIN) 300 mg capsule Take 300 mg by mouth 3 (three) times a day   Yes Historical Provider, MD   magnesium oxide-pyridoxine (BEELITH) 362-20 MG TABS Take 1 tablet by mouth daily   Yes Historical Provider, MD   metFORMIN (GLUCOPHAGE) 500 mg tablet Take 500 mg by mouth 2 (two) times a day with meals   Yes Historical Provider, MD   metoprolol succinate (TOPROL-XL) 100 mg 24 hr tablet Take 150 mg by mouth 2 (two) times a day   Yes Historical Provider, MD   naltrexone (REVIA) 50 mg tablet Take 50 mg by mouth daily   Yes Historical Provider, MD   nicotine (NICODERM CQ) 21 mg/24 hr TD 24 hr patch Place 1 patch on the skin every 24 hours   Yes Historical Provider, MD   pantoprazole (PROTONIX) 40 mg tablet Take 40 mg by mouth daily   Yes Historical Provider, MD   pyridoxine (B-6) 100 MG tablet Take 100 mg by mouth daily   Yes Historical Provider, MD   ranolazine (RANEXA) 500 mg 12 hr tablet Take 500 mg by mouth 2 (two) times a day   Yes Historical Provider, MD   saccharomyces boulardii (FLORASTOR) 250 mg capsule Take 250 mg by mouth 2 (two) times a day   Yes Historical Provider, MD   tamsulosin (FLOMAX) 0.4 mg Take 0.4 mg by mouth daily with dinner   Yes Historical Provider, MD   Thiamine Mononitrate (VITAMIN B1) 100 mg tablet Take  100 mg by mouth daily   Yes Historical Provider, MD     Not on File    Objective :  Temp:  [97.5 °F (36.4 °C)] 97.5 °F (36.4 °C)  HR:  [] 89  BP: ()/(31-85) 93/51  Resp:  [16-20] 17  SpO2:  [80 %-100 %] 96 %  O2 Device: Nasal cannula  Nasal Cannula O2 Flow Rate (L/min):  [2 L/min] 2 L/min    Physical Exam  Constitutional:       General: He is not in acute distress.  HENT:      Head: Normocephalic.      Comments: Open abrasion wound on posterior aspect of scalp     Mouth/Throat:      Mouth: Mucous membranes are moist.      Pharynx: Oropharynx is clear.   Eyes:      Extraocular Movements: Extraocular movements intact.      Pupils: Pupils are equal, round, and reactive to light.   Cardiovascular:      Rate and Rhythm: Normal rate. Rhythm irregular.      Pulses: Normal pulses.      Heart sounds: Normal heart sounds.   Pulmonary:      Effort: Pulmonary effort is normal. No respiratory distress.      Breath sounds: Wheezing present. No rhonchi or rales.   Abdominal:      General: Abdomen is flat. There is no distension.      Palpations: Abdomen is soft.      Tenderness: There is no abdominal tenderness. There is no guarding or rebound.   Musculoskeletal:      Right lower leg: No edema.      Left lower leg: No edema.   Skin:     General: Skin is warm and dry.      Findings: Bruising present.   Neurological:      General: No focal deficit present.      Mental Status: He is alert and oriented to person, place, and time.        Lines/Drains:            Lab Results: I have reviewed the following results:  Results from last 7 days   Lab Units 05/04/25  2328   WBC Thousand/uL 3.48*   HEMOGLOBIN g/dL 10.2*   HEMATOCRIT % 31.7*   PLATELETS Thousands/uL 79*     Results from last 7 days   Lab Units 05/04/25  2328   SODIUM mmol/L 141   POTASSIUM mmol/L 3.4*   CHLORIDE mmol/L 103   CO2 mmol/L 29   BUN mg/dL 10   CREATININE mg/dL 0.90   ANION GAP mmol/L 9   CALCIUM mg/dL 7.3*   GLUCOSE RANDOM mg/dL 127             No  "results found for: \"HGBA1C\"  Results from last 7 days   Lab Units 05/05/25  0224   LACTIC ACID mmol/L 0.9             Administrative Statements       ** Please Note: This note has been constructed using a voice recognition system. **    "

## 2025-05-05 NOTE — ASSESSMENT & PLAN NOTE
Multi electrolyte abnormality on admission:   Potassium 3.4  Calcium 7.3  Magnesium 1.2  Replenished in the ED    Plan:  Monitor daily electrolytes, replenish as needed

## 2025-05-05 NOTE — PROCEDURES
POC FAST US    Date/Time: 5/4/2025 8:29 PM    Performed by: Evelin Jimenez PA-C  Authorized by: Evelin Jimenez PA-C    Patient location:  Trauma  Other Assisting Provider: No    Procedure details:     Exam Type:  Diagnostic    Indications: blunt abdominal trauma and blunt chest trauma      Assess for:  Intra-abdominal fluid and pericardial effusion    Technique: FAST      Views obtained:  Heart - Pericardial sac, LUQ - Splenorenal space, RUQ - Pat's Pouch and Suprapubic - Pouch of Wilfredo    Image quality: diagnostic      Image availability:  Images available in PACS  FAST Findings:     RUQ (Hepatorenal) free fluid: absent      LUQ (Splenorenal) free fluid: absent      Suprapubic free fluid: absent      Cardiac wall motion: identified      Pericardial effusion: absent    Interpretation:     Impressions: negative

## 2025-05-05 NOTE — ED NOTES
Requested meds from pharm [id number]     requested meds from pharm again at this time            Francia George, RN  05/05/25 0507

## 2025-05-05 NOTE — CASE MANAGEMENT
Case Management Progress Note    Patient name Valentín Can  Location ED-05/ED-05 MRN [id number]  : 1960 Date 2025       LOS (days): 0  Geometric Mean LOS (GMLOS) (days):   Days to GMLOS:        OBJECTIVE:        Current admission status: Observation  Preferred Pharmacy:   UNKNOWN - FOLLOW UP PRIOR TO DISCHARGE TO E-PRESCRIBE  No address on file      Primary Care Provider: Lesly Rivera MD    Primary Insurance: [employer]  Secondary Insurance:     PROGRESS NOTE:    CM received consult for LESLI/OUD. CM contacted [facility]  to refer patient and provided minimal necessary information. CRS to meet with patient and follow up with CM to provide update on plan of care following patient connection.     CRS met with patient at bedside and provided pt with her card.

## 2025-05-05 NOTE — ASSESSMENT & PLAN NOTE
WBC 3.4, hemoglobin 10.2, platelet 79  Appears to be chronic and most likely related to chronic alcoholism  Monitor daily CBC

## 2025-05-05 NOTE — ASSESSMENT & PLAN NOTE
History of CAD s/p CABG in 2004 and PCI to left main coronary artery in 2014  Currently on aspirin    Plan:  Continue aspirin while inpatient, see plan above under abnormal EKG

## 2025-05-05 NOTE — UTILIZATION REVIEW
Initial Clinical Review    Admission: Date/Time/Statement: 5/4/25 2340 observation and CHANGED 5/5/25 1543 INPATIENT RE:  PATIENT NEEDS > 2 MIDNIGHT STAY DUE TO CONTINUED TELEMETRY FOR MONITORING OF ATRIAL FIB WITH RVR WITH RESUMPTION OF HOME MEDICATIONS; ALCOHOL ABUSE WITH CONTINUED CIWA,  MONITOR MENTAL STATUS WITH FALL AND HEAD STRIKE PRIOR TO ARRIVAL.    Admission Orders (From admission, onward)       Ordered        05/05/25 1543  INPATIENT ADMISSION  Once            05/04/25 2340  Place in Observation  Once                          Orders Placed This Encounter   Procedures    INPATIENT ADMISSION     Standing Status:   Standing     Number of Occurrences:   1     Level of Care:   Med Surg [16]     Estimated length of stay:   More than 2 Midnights     Certification:   I certify that inpatient services are medically necessary for this patient for a duration of greater than two midnights. See H&P and MD Progress Notes for additional information about the patient's course of treatment.     ED Arrival Information       Expected   -    Arrival   5/4/2025 19:51    Acuity   Emergent              Means of arrival   Amb[facility]    Admission type   Emergency              Arrival complaint   -             Chief Complaint   Patient presents with    Fall       Initial Presentation: 65 y.o. male  to ED via EMS from home.    Admitted to observation AND CONVERTED TO INPATIENT with Dx: Fall: fracture of T1/abnormal ecg/electrolyte abnormality/alcohol abuse.  Presented to ED  with head pain after a fall with unknown head stroke.  Neighbor heard him calling for help.  Was drinking alcohol and lost balance.  Drinks 1 pint of liquor daily.   Uses home oxygen intermittently - as needed.   PMHx: A-fib on Eliquis, CAD s/p CABG in 2004, and PCI to the left main coronary artery in 2014, COPD, alcohol abuse, HFpEF, HTN, GERD, alcoholic fatty liver disease . On exam:  right occipital  hematoma with overlying abrasion.   Bruising on all extremities and back in various stages.  Abrasions all extremities appear old. H&H 11.2/33.  Blood ethanol level 371    Imaging shows: Minimally displaced fracture of the left transverse process of L1 that may be acute or chronic.  EKG reveals rate controlled A-fib with what appear to be ST depressions in leads II, V3, V4, V5, and V6, these changes appear to be present on EKG done in December however some prior EKGs do not reveal presence of ST changes.  ED treatment:  tetanus,  IV analgesia x 2,  2.5 liters of IVF.  Given calcium, KCL and Mg.    In ED, BP to 64/37 and improved with IVF  Plan includes:  telemetry.  Replete electrolytes and trend.   Hold home antihypertensives - Cardizem and metoprolol and trend BP.  Continue home asa and Eliquis. Continue oxygen supplementation as needed.  Continue home albuterol and Breo Ellipta inhalers     Anticipated Length of Stay/Certification Statement: Patient will be admitted on an inpatient basis with an anticipated length of stay of greater than 2 midnights secondary to abnormal EKG.     Date: 5/5/25   Day 2 CHANGED TO INPATIENT : Complaints of a headache.  On exam:  posterior scalp hematoma with overlying abrasion.   Bruising in multiple stages along all 4 extremities and right side of back/Flank.   Telemetry atrial fib, irregular.    Calcium 7.4.  H&H 9.2/29.1.   Telemetry & continuous pulse oximetry.  Started  home oral Diltiazem, Toprol XL.   Continue IVF, CIWA.  Monitor BP and suspected initial hypotension due to volume depletion.      Patient has crossed 3 midnights and requires ongoing care    5/6/2025 .  Patient presents with ? diarrhea.  Has  Fatigue and tremors    On exam; alert. Coarse breath sounds.   Telemetry atrial fib.  Hypoxia on room air.   Anxious.   Abnormal labs or imaging:  H&H 8.9/28.  Mg 1.4.    Diagnosis/Plan    Fall/atrial fib with RVR, likely due to non compliance/alcohol abuse/hypertension  with hypotension on arrival/COPD with chronic hypoxic respiratory failure, non compliance with home oxygen.     Telemetry.  Continue Cardizem and Toprol Xl.  Started on Florastor.  Check stool C diff.  CIWA.  Oral thiamine, folic acid.  Continued IVF.  PT/OT    ED Treatment-Medication Administration from 05/04/2025 1944 to 05/05/2025 0730         Date/Time Order Dose Route Action     05/04/2025 2052 tetanus-diphtheria-acellular pertussis (BOOSTRIX) IM injection 0.5 mL 0.5 mL Intramuscular Given     05/04/2025 2049 acetaminophen (Ofirmev) injection 1,000 mg 1,000 mg Intravenous New Bag     05/04/2025 2335 ketorolac (TORADOL) injection 15 mg 15 mg Intravenous Given     05/04/2025 2348 sodium chloride 0.9 % bolus 1,000 mL 1,000 mL Intravenous New Bag     05/05/2025 0020 calcium gluconate 2 g in sodium chloride 0.9% 100 mL (premix) 2 g Intravenous New Bag     05/05/2025 0021 potassium chloride (Klor-Con M20) CR tablet 40 mEq 40 mEq Oral Given     05/05/2025 0121 magnesium sulfate 2 g/50 mL IVPB (premix) 2 g 2 g Intravenous New Bag     05/05/2025 0057 multi-electrolyte (Plasmalyte-A/Isolyte-S PH 7.4/Normosol-R) IV solution 75 mL/hr Intravenous New Bag     05/05/2025 0311 magnesium sulfate 4 g/100 mL IVPB (premix) 4 g 4 g Intravenous New Bag     05/05/2025 0257 multi-electrolyte (Plasmalyte-A/Isolyte-S PH 7.4/Normosol-R) IV bolus 500 mL 500 mL Intravenous New Bag            Scheduled Medications:  apixaban, 5 mg, Oral, BID  aspirin, 81 mg, Oral, Daily  atorvastatin, 40 mg, Oral, Daily  [Held by provider] diltiazem, 120 mg, Oral, BID  ferrous sulfate, 325 mg, Oral, Daily With Breakfast  fluticasone-vilanterol, 1 puff, Inhalation, Daily  folic acid, 1 mg, Oral, Daily  gabapentin, 300 mg, Oral, TID  insulin lispro, 1-6 Units, Subcutaneous, 4x Daily (AC & HS)  magnesium Oxide, 400 mg, Oral, Daily  [Held by provider] metoprolol succinate, 150 mg, Oral, BID  naltrexone, 50 mg, Oral, Daily  nicotine, 1 patch, Transdermal,  Q24H  pantoprazole, 40 mg, Oral, Early Morning  pyridoxine, 100 mg, Oral, Daily  ranolazine, 500 mg, Oral, BID  tamsulosin, 0.4 mg, Oral, Daily With Dinner  Thiamine Mononitrate, 100 mg, Oral, Daily    acetaminophen (TYLENOL) tablet 650 mg  Dose: 650 mg  Freq: Once Route: PO  Start: 05/05/25 1830 End: 05/05/25 1835  LORazepam (ATIVAN) tablet 2 mg  Dose: 2 mg  Freq: Once Route: PO  Indications of Use: ALCOHOL WITHDRAWAL SYNDROME  Start: 05/05/25 2230 End: 05/05/25 2220    magnesium sulfate 2 g/50 mL IVPB (premix) 2 g  Dose: 2 g  Freq: Once Route: IV  Last Dose: 2 g (05/06/25 0911)  Start: 05/06/25 0845 End: 05/06/25 1111   saccharomyces boulardii (FLORASTOR) capsule 250 mg  Dose: 250 mg  Freq: 2 times daily Route: PO  Start: 05/06/25 1045    Continuous IV Infusions:  multi-electrolyte, 75 mL/hr, Intravenous, Continuous    PRN Meds:  albuterol, 2 puff, Inhalation, Q6H PRN x 1 5/6    ED Triage Vitals   Temperature Pulse Respirations Blood Pressure SpO2 Pain Score   05/04/25 2000 05/04/25 1953 05/04/25 1953 05/04/25 1953 05/04/25 1953 05/04/25 2100   97.5 °F (36.4 °C) 98 20 125/76 97 % 6     Weight (last 2 days)       Date/Time Weight    05/04/25 19:53:59 81 (178.57)          Vital Signs (last 3 days)       Date/Time Temp Pulse Resp BP MAP (mmHg) SpO2 Calculated FIO2 (%) - Nasal Cannula Nasal Cannula O2 Flow Rate (L/min) O2 Device Patient Position - Orthostatic VS [place] Coma Scale Score CIWA-Ar Total Pain    05/06/25 11:33:11 98.5 °F (36.9 °C) 69 -- 121/79 93 86 % -- -- -- -- -- -- --    05/06/25 1100 -- -- -- -- -- -- -- -- -- -- -- 4 --    05/06/25 07:56:47 98.1 °F (36.7 °C) 93 -- 151/91 111 88 % -- -- -- -- -- -- --    05/06/25 0700 -- -- -- -- -- -- -- -- -- -- -- 4 --    05/06/25 0316 -- -- -- -- -- -- -- -- -- -- -- 3 --    05/05/25 2316 -- -- -- -- -- -- -- -- -- -- -- 4 --    05/05/25 2300 -- -- -- -- -- -- -- -- -- -- 15 -- 3    05/05/25 2216 -- 81 -- -- -- -- -- -- -- -- -- 8 --    05/05/25 22:08:55 98.6 °F  (37 °C) 75 -- 154/83 107 95 % -- -- -- -- -- -- --    05/05/25 2045 -- -- -- -- -- -- -- -- -- -- -- 5 --    05/05/25 18:54:52 -- 73 -- 126/78 94 94 % -- -- -- -- -- -- --    05/05/25 1835 -- -- -- -- -- -- -- -- -- -- -- -- 4    05/05/25 16:43:33 98.6 °F (37 °C) 75 -- 130/69 89 93 % -- -- -- -- -- 7 --    05/05/25 1545 -- -- -- -- -- -- -- -- -- -- 14 -- No Pain    05/05/25 15:34:02 98.9 °F (37.2 °C) 81 18 145/79 101 93 % -- -- None (Room air) Lying -- 9 --    05/05/25 1421 -- -- -- -- -- -- -- -- -- -- -- -- No Pain    05/05/25 1400 -- 92 -- 141/88 -- 96 % -- -- None (Room air) -- -- -- No Pain    05/05/25 1255 -- -- -- -- -- -- -- -- -- -- -- -- No Pain    05/05/25 1230 -- 92 -- 132/73 96 96 % -- -- None (Room air) -- -- -- No Pain    05/05/25 1200 -- 90 18 150/89 115 94 % 36 4 L/min Nasal cannula -- -- -- No Pain    05/05/25 1146 -- -- -- -- -- -- -- -- -- -- -- -- No Pain    05/05/25 1100 -- 101 19 127/76 99 94 % 36 4 L/min Nasal cannula -- -- -- --    05/05/25 1036 -- -- -- 157/89 -- -- -- -- -- -- -- -- --    05/05/25 1012 -- 118 18 -- -- 97 % 36 4 L/min Nasal cannula -- -- -- No Pain    05/05/25 0934 -- 131 18 -- -- 98 % 36 4 L/min Nasal cannula -- -- -- --    05/05/25 0930 -- 170 -- 165/99 123 97 % -- -- -- -- -- -- --    05/05/25 0913 -- 150 -- 153/73 -- -- -- -- -- -- -- -- --    05/05/25 0900 -- 123 18 186/85 108 99 % 36 4 L/min Nasal cannula -- -- 3 No Pain    05/05/25 0850 -- -- -- -- -- -- -- -- -- -- -- -- No Pain    05/05/25 0800 -- 131 -- 180/79 -- 98 % 36 4 L/min Nasal cannula -- -- -- No Pain    05/05/25 0715 -- -- -- -- -- -- -- -- -- -- -- -- No Pain    05/05/25 0708 -- 101 18 156/85 -- 98 % 40 5 L/min Nasal cannula -- -- -- No Pain    05/05/25 0630 -- 99 16 155/90 118 98 % 28 2 L/min Nasal cannula Lying -- -- --    05/05/25 0615 -- 97 18 122/68 90 96 % 28 2 L/min Nasal cannula Lying -- -- --    05/05/25 0600 -- 92 -- 117/60 -- -- -- -- -- -- -- 1 --    05/05/25 0545 -- 98 17 117/60 83 94 % 28  2 L/min Nasal cannula Lying -- -- --    05/05/25 0530 -- 103 16 143/63 91 98 % 28 2 L/min Nasal cannula Lying -- -- --    05/05/25 0515 -- 92 17 126/67 93 96 % 28 2 L/min Nasal cannula Lying -- -- --    05/05/25 0500 -- 92 17 123/63 87 95 % 28 2 L/min Nasal cannula Lying -- -- --    05/05/25 0445 -- 92 18 118/65 86 97 % 28 2 L/min Nasal cannula Lying -- -- --    05/05/25 0400 -- 95 -- 127/75 95 98 % 28 2 L/min Nasal cannula Lying -- -- --    05/05/25 0345 -- 98 16 131/69 94 98 % 28 2 L/min Nasal cannula Lying -- -- --    05/05/25 0330 -- 94 16 124/63 87 95 % 28 2 L/min Nasal cannula Lying -- -- --    05/05/25 0321 -- -- -- -- -- -- -- -- -- -- 15 -- --    05/05/25 0318 -- 98 -- 93/51 -- -- -- -- -- -- -- 3 --    05/05/25 0315 -- 89 -- 93/51 70 96 % 28 2 L/min Nasal cannula Lying -- -- --    05/05/25 0300 -- 86 17 98/52 71 95 % 28 2 L/min Nasal cannula Lying -- -- --    05/05/25 0230 -- 87 -- 89/51  67 97 % 28 2 L/min Nasal cannula Lying -- -- --    05/05/25 0215 -- 91 -- 109/56 78 97 % 28 2 L/min Nasal cannula Lying -- -- --    05/05/25 0200 -- 94 16 122/65 87 97 % -- -- -- -- -- -- --    05/05/25 0145 -- 100 -- 117/68 88 99 % 28 2 L/min Nasal cannula -- -- -- --    05/05/25 0130 -- 91 -- 103/60 76 99 % 28 2 L/min Nasal cannula Lying -- -- --    05/05/25 0115 -- 85 -- 86/53  65 98 % 28 2 L/min Nasal cannula -- -- -- --    05/05/25 0100 -- 84 -- 85/48  61 97 % 28 2 L/min Nasal cannula Lying -- -- --    05/05/25 0045 -- 87 -- 85/52  64 97 % 28 2 L/min Nasal cannula Lying -- -- --    05/05/25 0030 -- 102 17 115/74 91 98 % 28 2 L/min Nasal cannula Lying -- -- --    05/04/25 2350 -- 98 -- -- -- 97 % -- -- -- -- -- -- --    05/04/25 2349 -- -- -- 131/64 -- -- -- -- -- -- -- -- --    05/04/25 2345 -- 99 -- 135/71 -- 98 % -- -- -- -- -- -- --    05/04/25 23:41:19 -- -- 18 132/65 -- 98 % -- -- Nasal cannula Lying 15 -- 3    05/04/25 2340 -- 97 17 132/65 -- 98 % -- -- Nasal cannula -- -- -- --    05/04/25 2335 -- 102 --  134/74 -- -- -- -- -- -- -- -- --    05/04/25 2330 -- 111 -- 144/75 -- -- -- -- -- -- -- -- --    05/04/25 2325 -- 98 -- 125/75 -- -- -- -- -- -- -- -- --    05/04/25 2320 -- 100 -- 107/66 -- 92 % -- -- -- -- -- -- --    05/04/25 2315 -- 123 -- -- -- 92 % -- -- -- -- -- -- --    05/04/25 2311 -- -- -- 92/66 -- -- -- -- -- -- -- -- --    05/04/25 2310 -- 110 -- 49/32 -- 92 % -- -- -- -- -- -- --    05/04/25 2306 -- -- -- 58/31 -- -- -- -- -- -- -- -- --    05/04/25 2305 -- 84 -- -- -- 98 % -- -- -- -- -- -- --    05/04/25 2300 -- 81 18 64/37  -- 98 % -- -- Nasal cannula Lying -- -- 3    05/04/25 2255 -- 83 -- -- -- 98 % -- -- -- -- -- -- --    05/04/25 2250 -- 86 -- -- -- 100 % -- -- -- -- -- -- --    05/04/25 2245 -- 90 -- -- -- 100 % -- -- -- -- -- -- --    05/04/25 2240 -- 88 -- -- -- 99 % -- -- -- -- -- -- --    05/04/25 2235 -- 88 -- -- -- 99 % -- -- -- -- -- -- --    05/04/25 2230 -- 91 -- -- -- 100 % -- -- -- -- -- -- --    05/04/25 2225 -- 90 -- -- -- 88 % -- -- -- -- -- -- --    05/04/25 2222 -- -- -- -- -- 80 % -- -- -- -- -- -- --    05/04/25 2220 -- 91 -- -- -- -- -- -- -- -- -- -- --    05/04/25 2215 -- 95 -- -- -- -- -- -- -- -- -- -- --    05/04/25 2213 -- -- -- -- -- 95 % -- -- -- -- -- -- --    05/04/25 2212 -- -- -- -- -- 83 % -- -- -- -- -- -- --    05/04/25 2210 -- 97 -- -- -- -- -- -- -- -- -- -- --    05/04/25 2205 -- 106 -- -- -- -- -- -- -- -- -- -- --    05/04/25 2204 -- -- -- -- -- 93 % -- -- -- -- -- -- --    05/04/25 2200 -- 98 17 135/75 -- 97 % -- -- None (Room air) -- 14 -- 3    05/04/25 2155 -- 110 -- -- -- 99 % -- -- -- -- -- -- --    05/04/25 2150 -- 115 -- -- -- 97 % -- -- -- -- -- -- --    05/04/25 2145 -- 109 -- 151/85 -- 98 % -- -- -- -- -- -- --    05/04/25 2140 -- 121 -- -- -- 97 % -- -- -- -- -- -- --    05/04/25 2135 -- 111 -- -- -- 96 % -- -- -- -- -- -- --    05/04/25 2130 -- 97 -- 131/75 -- 97 % -- -- -- -- -- -- --    05/04/25 2125 -- 114 -- -- -- 98 % -- -- -- -- -- -- --     05/04/25 2120 -- 113 -- -- -- 98 % -- -- -- -- -- -- --    05/04/25 2115 -- 116 -- -- -- 97 % -- -- -- -- -- -- --    05/04/25 2110 -- 97 -- -- -- 99 % -- -- -- -- -- -- --    05/04/25 2105 -- 99 -- -- -- 99 % -- -- -- -- -- -- --    05/04/25 2101 -- -- -- 157/85 -- -- -- -- -- -- -- -- --    05/04/25 2100 -- 92 20 157/85 -- 98 % -- -- Nasal cannula Lying 14 -- 6    05/04/25 2055 -- 91 -- -- -- 99 % -- -- -- -- -- -- --    05/04/25 2050 -- 87 -- -- -- 99 % -- -- -- -- -- -- --    05/04/25 2045 -- 93 18 115/78 -- 99 % -- -- None (Room air) -- 14 -- --    05/04/25 2040 -- 89 -- -- -- 99 % -- -- -- -- -- -- --    05/04/25 2035 -- 93 -- -- -- 98 % -- -- -- -- -- -- --    05/04/25 2030 -- 94 18 120/78 -- 98 % -- -- None (Room air) Lying 14 -- --    05/04/25 2025 -- 91 -- -- -- 99 % -- -- -- -- -- -- --    05/04/25 2020 -- 94 -- -- -- 99 % -- -- -- -- -- -- --    05/04/25 2015 -- 95 20 117/75 -- 99 % -- -- Nasal cannula -- 14 -- --    05/04/25 2003 -- 96 -- -- -- 90 % -- -- Nasal cannula -- -- -- --    05/04/25 2001 -- -- -- 116/68 -- -- -- -- -- -- -- -- --    05/04/25 2000 97.5 °F (36.4 °C) 89 20 116/68 -- 91 % -- -- None (Room air) -- 15 -- --    05/04/25 1955 -- 99 -- -- -- 95 % -- -- -- -- -- -- --    05/04/25 19:53:59 -- 98 20 125/76 -- 97 % -- -- None (Room air) -- 15 -- --    05/04/25 1953 -- -- -- 125/76 -- -- -- -- -- -- -- -- --           CIWA-Ar Score       Row Name 05/06/25 1100 05/06/25 0700 05/06/25 0316       CIWA-Ar    Nausea and Vomiting 0 0 0    Tactile Disturbances 0 0 0    Tremor 1 1 1    Auditory Disturbances 0 0 0    Paroxysmal Sweats 1 1 0    Visual Disturbances 0 0 0    Anxiety 1 1 0    Headache, Fullness in Head 0 0 0    Agitation 0 0 0    Orientation and Clouding of Sensorium 1 1 2    CIWA-Ar Total 4 4 3      Row Name 05/05/25 2316 05/05/25 2216 05/05/25 2045       CIWA-Ar    Pulse -- 81 --    Nausea and Vomiting 0 1 0    Tactile Disturbances 0 0 0    Tremor 0 0 0    Auditory Disturbances 0 0  0    Paroxysmal Sweats 0 0 0    Visual Disturbances 0 0 0    Anxiety 1 1 1    Headache, Fullness in Head 1 3 1    Agitation 0 0 0    Orientation and Clouding of Sensorium 2 3 3    CIWA-Ar Total 4 8 5      Row Name 05/05/25 16:43:33 05/05/25 15:34:02 05/05/25 0900       CIWA-Ar    Nausea and Vomiting 0 0 0    Tactile Disturbances 0 0 0    Tremor 1 2 3    Auditory Disturbances 0 0 0    Paroxysmal Sweats 0 1 0    Visual Disturbances 0 0 0    Anxiety 1 2 0    Headache, Fullness in Head 2 2 0    Agitation 0 1 0    Orientation and Clouding of Sensorium 3  doesnt know month, year or day of the week - off by 2 months, 1 year and 1 day 1 0    CIWA-Ar Total 7 9 3      Row Name 05/05/25 0600 05/05/25 0318          CIWA-Ar    /60 93/51     Pulse 92 98     Nausea and Vomiting 0 0     Tactile Disturbances 0 0     Tremor 0 0     Auditory Disturbances 0 0     Paroxysmal Sweats 0 0     Visual Disturbances 0 0     Anxiety 1 1     Headache, Fullness in Head 0 1     Agitation 0 1     Orientation and Clouding of Sensorium 0 0     CIWA-Ar Total 1 3                   Pertinent Labs/Diagnostic Test Results:   Radiology:  TRAUMA - CT head wo contrast   Final Interpretation by Ty Marinelli MD (05/04 2048)      No acute intracranial abnormality.         I personally discussed this study with KENYA CARRENO on 5/4/2025 8:48 PM.                        Workstation performed: [id number]         TRAUMA - CT spine cervical wo contrast   Final Interpretation by Ty Marinelli MD (05/04 2048)      No cervical spine fracture or traumatic malalignment.      Extensive anterior and posterior cervical spine fusion as described above.      Slightly thick-walled fluid collection at the laminectomy site in the posterior neck that may represent a seroma or pseudomeningocele.      Possible minimal loosening of the C6 pedicle screws.         I personally discussed this study with KENYA CARRENO on 5/4/2025 8:47 PM.                         Workstation performed: [id number]         TRAUMA - CT chest abdomen pelvis w contrast   Final Interpretation by Ty Marinelli MD (05/04 2046)      Multiple healing bilateral rib fractures.      Minimally displaced fracture of the left transverse process of L1 that may be acute or chronic.      Diffuse fatty infiltration of the liver.      Bilateral adrenal gland nodules. Follow-up adrenal mass protocol CT or MRI is recommended in 1 year.      Probable pulmonary hypertension.      Mild diffuse thickening of the colonic wall that is incompletely distended. Colitis not excluded. Clinical correlation advised.         I personally discussed this study with KENYA CARRENO on 5/4/2025 8:45 PM.               Workstation performed: [id number]         XR Trauma multiple (SLB/SLRA trauma bay ONLY)   Preliminary Result by Glo Harman (05/05 1029)      No acute cardiopulmonary disease within limitations of supine imaging.      Age-indeterminate fracture of the left anterior sixth rib. Old right 10th and 11th rib fractures.         Computerized Assisted Algorithm (CAA) may have been used to analyze all applicable images.      The study was marked in EPIC for immediate notification.      I personally discussed this study with KENYA CARRENO on 5/4/2025 8:47 PM.                        Workstation performed: [id number]         XR chest 1 view   Final Interpretation by Glo Harman (05/05 1029)      No acute cardiopulmonary disease within limitations of supine imaging.      Age-indeterminate fracture of the left anterior sixth rib. Old right 10th and 11th rib fractures.         Computerized Assisted Algorithm (CAA) may have been used to analyze all applicable images.      The study was marked in EPIC for immediate notification.      I personally discussed this study with KENYA CARRENO on 5/4/2025 8:47 PM.                        Workstation performed: [id number]            Cardiology:  ECG 12 lead   Final Result by Lolly Murray MD (05/05 1602)   Atrial fibrillation with rapid ventricular response with premature    ventricular or aberrantly conducted complexes   Rightward axis   ST & T wave abnormality, consider inferior ischemia   Abnormal ECG   When compared with ECG of 04-May-2025 23:25, (unconfirmed)   Vent. rate has increased by  62 bpm   T wave inversion no longer evident in Anterior leads   Confirmed by Lolly Murray ([id number]) on 5/5/2025 4:02:34 PM      ECG 12 lead   Final Result by Lolly Murray MD (05/05 1601)   Atrial fibrillation   Rightward axis   Incomplete right bundle branch block   ST & T wave abnormality, consider anterolateral ischemia   Abnormal ECG   When compared with ECG of 04-May-2025 20:24, (unconfirmed)   Incomplete right bundle branch block is now Present   Inverted T waves have replaced nonspecific T wave abnormality in Lateral    leads   Confirmed by Lolly Murray ([id number]) on 5/5/2025 4:01:52 PM      ECG 12 lead   Final Result by Lolly Murray MD (05/05 1601)   Atrial fibrillation with rapid ventricular response   Rightward axis   Incomplete right bundle branch block   ST & T wave abnormality, consider anterolateral ischemia   Abnormal ECG   When compared with ECG of 04-May-2025 20:24, (unconfirmed)   Inverted T waves have replaced nonspecific T wave abnormality in Lateral    leads   Confirmed by Lolly Murray ([id number]) on 5/5/2025 4:01:47 PM      ECG 12 lead   Final Result by Lolly Murray MD (05/05 1601)   Atrial fibrillation   Rightward axis   ST & T wave abnormality, consider anterior ischemia   Abnormal ECG   No previous ECGs available   Confirmed by Lolly Murray ([id number]) on 5/5/2025 4:01:33 PM        GI:  No orders to display     Results from last 7 days   Lab Units 05/06/25  0450 05/05/25  0422 05/04/25  2328 05/04/25 2003   WBC Thousand/uL 5.91 3.47* 3.48*  --    HEMOGLOBIN g/dL 8.9* 9.3* 10.2*  --    I STAT HEMOGLOBIN g/dl  --   --   --   11.2*   HEMATOCRIT % 28.0* 29.1* 31.7*  --    HEMATOCRIT, ISTAT %  --   --   --  33*   PLATELETS Thousands/uL 61* 73* 79*  --    TOTAL NEUT ABS Thousands/µL  --  1.48*  --   --      Results from last 7 days   Lab Units 05/06/25  0450 05/05/25 0422 05/04/25  2346 05/04/25 2328 05/04/25 2003   SODIUM mmol/L 134* 142  --  141  --    POTASSIUM mmol/L 3.9 3.9  --  3.4*  --    CHLORIDE mmol/L 99 107  --  103  --    CO2 mmol/L 26 26  --  29  --    CO2, I-STAT mmol/L  --   --   --   --  26   ANION GAP mmol/L 9 9  --  9  --    BUN mg/dL 12 10  --  10  --    CREATININE mg/dL 0.82 0.79  --  0.90  --    EGFR ml/min/1.73sq m 94 95  --  90  --    CALCIUM mg/dL 7.7* 7.4*  --  7.3*  --    CALCIUM, IONIZED mmol/L  --  1.01*  --   --   --    CALCIUM, IONIZED, ISTAT mmol/L  --   --   --   --  0.93*   MAGNESIUM mg/dL 1.4* 2.1 1.2*  --   --      Results from last 7 days   Lab Units 05/06/25  0450   AST U/L 49*   ALT U/L 23   ALK PHOS U/L 112*   TOTAL PROTEIN g/dL 5.7*   ALBUMIN g/dL 2.9*   TOTAL BILIRUBIN mg/dL 0.80     Results from last 7 days   Lab Units 05/06/25  1133 05/06/25  0756 05/05/25  2107 05/05/25  1619 05/05/25  1134 05/05/25  0745 05/05/25  0706   POC GLUCOSE mg/dl 148* 121 136 137 125 102 83     Results from last 7 days   Lab Units 05/06/25 0450 05/05/25 0422 05/04/25 2328   GLUCOSE RANDOM mg/dL 104 86 127     Results from last 7 days   Lab Units 05/05/25  0224   HEMOGLOBIN A1C % 5.2   EAG mg/dl 103     Results from last 7 days   Lab Units 05/04/25 2003   PH, WALI I-STAT  7.464*   PCO2, WALI ISTAT mm HG 35.1*   PO2, WALI ISTAT mm HG 49.0*   HCO3, WALI ISTAT mmol/L 25.2   I STAT BASE EXC mmol/L 2   I STAT O2 SAT % 87*     Results from last 7 days   Lab Units 05/04/25  2346   CK TOTAL U/L 83     Results from last 7 days   Lab Units 05/05/25  0422 05/05/25  0131 05/04/25  2328   HS TNI 0HR ng/L  --   --  24   HS TNI 2HR ng/L  --  21  --    HSTNI D2 ng/L  --  -3  --    HS TNI 4HR ng/L 21  --   --    HSTNI D4 ng/L -3  --    --      Results from last 7 days   Lab Units 05/05/25  0224   LACTIC ACID mmol/L 0.9     Results from last 7 days   Lab Units 05/04/25  2346   AMPH/METH  Negative   BARBITURATE UR  Negative   BENZODIAZEPINE UR  Negative   COCAINE UR  Negative   METHADONE URINE  Negative   OPIATE UR  Negative   PCP UR  Negative   THC UR  Negative     Results from last 7 days   Lab Units 05/04/25  2346   ETHANOL LVL mg/dL 371*     History reviewed. No pertinent past medical history.  Present on Admission:  **None**      Admitting Diagnosis: Head pain [R51.9]  Age/Sex: 65 y.o. male    Network Utilization Review Department  ATTENTION: Please call with any questions or concerns to [phone] and carefully listen to the prompts so that you are directed to the right person. All voicemails are confidential.   For Discharge needs, contact Care Management DC Support Team at [phone] opt. [phone]  Send all requests for admission clinical reviews, approved or denied determinations and any other requests to dedicated fax number below belonging to the campus where the patient is receiving treatment. List of dedicated fax numbers for the Facilities:  FACILITY NAME UR FAX NUMBER   ADMISSION DENIALS (Administrative/Medical Necessity) [phone]   DISCHARGE SUPPORT TEAM (NETWORK) [phone]   PARENT CHILD HEALTH (Maternity/NICU/Pediatrics) [phone]   [facility] [phone]   [facility] [phone]   [facility] [phone]   [facility] [phone]   [facility] [phone]   [facility] [phone]   [facility] [phone]   [facility] [address] [facility] [phone]   [facility] [phone]   [facility] [phone]   [facility] [phone]   [facility]  [facility] [phone]

## 2025-05-05 NOTE — ASSESSMENT & PLAN NOTE
Presenting s/p fall which occurred at home while intoxicated with head strike currently on aspirin and Plavix  Trauma workup negative  EKG reveals rate controlled A-fib with what appear to be ST depressions in leads II, V3, V4, V5, and V6, these changes appear to be present on EKG done in December however some prior EKGs do not reveal presence of ST changes  Has history with chronic angina, underwent pharmacologic stress testing in 2022 which was negative.  Has history of CAD s/p CABG in 2004 and PCI to the LAD in 2014  Initial troponin 24  Currently no symptoms of ACS such as chest pain, shortness of breath, or diaphoresis   Suspect EKG changes could be due to increased demand with combination of A-fib, dehydration, and alcohol intoxication with fall    Plan:  Continue to trend troponin  Monitor on telemetry  Prior echo from 8/2024 revealed EF 65%, with mild to moderate concentric hypertrophy, severe left atrial dilation, mild to moderate tricuspid and mitral valve regurgitation  Will hold off on ECHO now

## 2025-05-06 PROBLEM — R19.7 DIARRHEA: Status: ACTIVE | Noted: 2025-05-06

## 2025-05-06 LAB
ALBUMIN SERPL BCG-MCNC: 2.9 G/DL (ref 3.5–5)
ALBUMIN SERPL BCG-MCNC: 2.9 G/DL (ref 3.5–5)
ALBUMIN SERPL BCG-MCNC: 3 G/DL (ref 3.5–5)
ALBUMIN SERPL BCG-MCNC: 3 G/DL (ref 3.5–5)
ALP SERPL-CCNC: 112 U/L (ref 34–104)
ALP SERPL-CCNC: 112 U/L (ref 34–104)
ALP SERPL-CCNC: 119 U/L (ref 34–104)
ALP SERPL-CCNC: 119 U/L (ref 34–104)
ALT SERPL W P-5'-P-CCNC: 23 U/L (ref 7–52)
ALT SERPL W P-5'-P-CCNC: 23 U/L (ref 7–52)
ALT SERPL W P-5'-P-CCNC: 26 U/L (ref 7–52)
ALT SERPL W P-5'-P-CCNC: 26 U/L (ref 7–52)
ANION GAP SERPL CALCULATED.3IONS-SCNC: 11 MMOL/L (ref 4–13)
ANION GAP SERPL CALCULATED.3IONS-SCNC: 11 MMOL/L (ref 4–13)
ANION GAP SERPL CALCULATED.3IONS-SCNC: 9 MMOL/L (ref 4–13)
ANION GAP SERPL CALCULATED.3IONS-SCNC: 9 MMOL/L (ref 4–13)
ANISOCYTOSIS BLD QL SMEAR: PRESENT
ANISOCYTOSIS BLD QL SMEAR: PRESENT
AST SERPL W P-5'-P-CCNC: 49 U/L (ref 13–39)
AST SERPL W P-5'-P-CCNC: 49 U/L (ref 13–39)
AST SERPL W P-5'-P-CCNC: 65 U/L (ref 13–39)
AST SERPL W P-5'-P-CCNC: 65 U/L (ref 13–39)
ATRIAL RATE: 87 BPM
BASOPHILS # BLD MANUAL: 0.09 THOUSAND/UL (ref 0–0.1)
BASOPHILS # BLD MANUAL: 0.09 THOUSAND/UL (ref 0–0.1)
BASOPHILS NFR MAR MANUAL: 1 % (ref 0–1)
BASOPHILS NFR MAR MANUAL: 1 % (ref 0–1)
BILIRUB SERPL-MCNC: 0.8 MG/DL (ref 0.2–1)
BILIRUB SERPL-MCNC: 0.8 MG/DL (ref 0.2–1)
BILIRUB SERPL-MCNC: 1.19 MG/DL (ref 0.2–1)
BILIRUB SERPL-MCNC: 1.19 MG/DL (ref 0.2–1)
BUN SERPL-MCNC: 12 MG/DL (ref 5–25)
BUN SERPL-MCNC: 12 MG/DL (ref 5–25)
BUN SERPL-MCNC: 13 MG/DL (ref 5–25)
BUN SERPL-MCNC: 13 MG/DL (ref 5–25)
CALCIUM ALBUM COR SERPL-MCNC: 8.5 MG/DL (ref 8.3–10.1)
CALCIUM ALBUM COR SERPL-MCNC: 8.5 MG/DL (ref 8.3–10.1)
CALCIUM ALBUM COR SERPL-MCNC: 8.6 MG/DL (ref 8.3–10.1)
CALCIUM ALBUM COR SERPL-MCNC: 8.6 MG/DL (ref 8.3–10.1)
CALCIUM SERPL-MCNC: 7.7 MG/DL (ref 8.4–10.2)
CARDIAC TROPONIN I PNL SERPL HS: 23 NG/L (ref 8–18)
CARDIAC TROPONIN I PNL SERPL HS: 23 NG/L (ref 8–18)
CHLORIDE SERPL-SCNC: 95 MMOL/L (ref 96–108)
CHLORIDE SERPL-SCNC: 95 MMOL/L (ref 96–108)
CHLORIDE SERPL-SCNC: 99 MMOL/L (ref 96–108)
CHLORIDE SERPL-SCNC: 99 MMOL/L (ref 96–108)
CO2 SERPL-SCNC: 24 MMOL/L (ref 21–32)
CO2 SERPL-SCNC: 24 MMOL/L (ref 21–32)
CO2 SERPL-SCNC: 26 MMOL/L (ref 21–32)
CO2 SERPL-SCNC: 26 MMOL/L (ref 21–32)
CREAT SERPL-MCNC: 0.82 MG/DL (ref 0.6–1.3)
CREAT SERPL-MCNC: 0.82 MG/DL (ref 0.6–1.3)
CREAT SERPL-MCNC: 1.45 MG/DL (ref 0.6–1.3)
CREAT SERPL-MCNC: 1.45 MG/DL (ref 0.6–1.3)
EOSINOPHIL # BLD MANUAL: 0 THOUSAND/UL (ref 0–0.4)
EOSINOPHIL # BLD MANUAL: 0 THOUSAND/UL (ref 0–0.4)
EOSINOPHIL NFR BLD MANUAL: 0 % (ref 0–6)
EOSINOPHIL NFR BLD MANUAL: 0 % (ref 0–6)
ERYTHROCYTE [DISTWIDTH] IN BLOOD BY AUTOMATED COUNT: 19.8 % (ref 11.6–15.1)
ERYTHROCYTE [DISTWIDTH] IN BLOOD BY AUTOMATED COUNT: 19.8 % (ref 11.6–15.1)
ERYTHROCYTE [DISTWIDTH] IN BLOOD BY AUTOMATED COUNT: 19.9 % (ref 11.6–15.1)
ERYTHROCYTE [DISTWIDTH] IN BLOOD BY AUTOMATED COUNT: 19.9 % (ref 11.6–15.1)
GFR SERPL CREATININE-BSD FRML MDRD: 50 ML/MIN/1.73SQ M
GFR SERPL CREATININE-BSD FRML MDRD: 50 ML/MIN/1.73SQ M
GFR SERPL CREATININE-BSD FRML MDRD: 94 ML/MIN/1.73SQ M
GFR SERPL CREATININE-BSD FRML MDRD: 94 ML/MIN/1.73SQ M
GLUCOSE SERPL-MCNC: 104 MG/DL (ref 65–140)
GLUCOSE SERPL-MCNC: 104 MG/DL (ref 65–140)
GLUCOSE SERPL-MCNC: 121 MG/DL (ref 65–140)
GLUCOSE SERPL-MCNC: 121 MG/DL (ref 65–140)
GLUCOSE SERPL-MCNC: 127 MG/DL (ref 65–140)
GLUCOSE SERPL-MCNC: 127 MG/DL (ref 65–140)
GLUCOSE SERPL-MCNC: 133 MG/DL (ref 65–140)
GLUCOSE SERPL-MCNC: 133 MG/DL (ref 65–140)
GLUCOSE SERPL-MCNC: 134 MG/DL (ref 65–140)
GLUCOSE SERPL-MCNC: 134 MG/DL (ref 65–140)
GLUCOSE SERPL-MCNC: 148 MG/DL (ref 65–140)
GLUCOSE SERPL-MCNC: 148 MG/DL (ref 65–140)
HCT VFR BLD AUTO: 27.7 % (ref 36.5–49.3)
HCT VFR BLD AUTO: 27.7 % (ref 36.5–49.3)
HCT VFR BLD AUTO: 28 % (ref 36.5–49.3)
HCT VFR BLD AUTO: 28 % (ref 36.5–49.3)
HGB BLD-MCNC: 8.8 G/DL (ref 12–17)
HGB BLD-MCNC: 8.8 G/DL (ref 12–17)
HGB BLD-MCNC: 8.9 G/DL (ref 12–17)
HGB BLD-MCNC: 8.9 G/DL (ref 12–17)
LACTATE SERPL-SCNC: 1.1 MMOL/L (ref 0.5–2)
LACTATE SERPL-SCNC: 1.1 MMOL/L (ref 0.5–2)
LACTATE SERPL-SCNC: 3.5 MMOL/L (ref 0.5–2)
LACTATE SERPL-SCNC: 3.5 MMOL/L (ref 0.5–2)
LG PLATELETS BLD QL SMEAR: PRESENT
LG PLATELETS BLD QL SMEAR: PRESENT
LYMPHOCYTES # BLD AUTO: 0.69 THOUSAND/UL (ref 0.6–4.47)
LYMPHOCYTES # BLD AUTO: 0.69 THOUSAND/UL (ref 0.6–4.47)
LYMPHOCYTES # BLD AUTO: 8 % (ref 14–44)
LYMPHOCYTES # BLD AUTO: 8 % (ref 14–44)
MAGNESIUM SERPL-MCNC: 1.4 MG/DL (ref 1.9–2.7)
MAGNESIUM SERPL-MCNC: 1.4 MG/DL (ref 1.9–2.7)
MCH RBC QN AUTO: 31 PG (ref 26.8–34.3)
MCH RBC QN AUTO: 31 PG (ref 26.8–34.3)
MCH RBC QN AUTO: 31.3 PG (ref 26.8–34.3)
MCH RBC QN AUTO: 31.3 PG (ref 26.8–34.3)
MCHC RBC AUTO-ENTMCNC: 31.8 G/DL (ref 31.4–37.4)
MCV RBC AUTO: 98 FL (ref 82–98)
MCV RBC AUTO: 98 FL (ref 82–98)
MCV RBC AUTO: 99 FL (ref 82–98)
MCV RBC AUTO: 99 FL (ref 82–98)
METAMYELOCYTE ABSOLUTE CT: 0.09 THOUSAND/UL (ref 0–0.1)
METAMYELOCYTE ABSOLUTE CT: 0.09 THOUSAND/UL (ref 0–0.1)
METAMYELOCYTES NFR BLD MANUAL: 1 % (ref 0–1)
METAMYELOCYTES NFR BLD MANUAL: 1 % (ref 0–1)
MONOCYTES # BLD AUTO: 0.69 THOUSAND/UL (ref 0–1.22)
MONOCYTES # BLD AUTO: 0.69 THOUSAND/UL (ref 0–1.22)
MONOCYTES NFR BLD: 8 % (ref 4–12)
MONOCYTES NFR BLD: 8 % (ref 4–12)
MYELOCYTE ABSOLUTE CT: 0.09 THOUSAND/UL (ref 0–0.1)
MYELOCYTE ABSOLUTE CT: 0.09 THOUSAND/UL (ref 0–0.1)
MYELOCYTES NFR BLD MANUAL: 1 % (ref 0–1)
MYELOCYTES NFR BLD MANUAL: 1 % (ref 0–1)
NEUTROPHILS # BLD MANUAL: 6.97 THOUSAND/UL (ref 1.85–7.62)
NEUTROPHILS # BLD MANUAL: 6.97 THOUSAND/UL (ref 1.85–7.62)
NEUTS SEG NFR BLD AUTO: 81 % (ref 43–75)
NEUTS SEG NFR BLD AUTO: 81 % (ref 43–75)
PLATELET # BLD AUTO: 61 THOUSANDS/UL (ref 149–390)
PLATELET # BLD AUTO: 61 THOUSANDS/UL (ref 149–390)
PLATELET # BLD AUTO: 64 THOUSANDS/UL (ref 149–390)
PLATELET # BLD AUTO: 64 THOUSANDS/UL (ref 149–390)
PLATELET BLD QL SMEAR: ABNORMAL
PLATELET BLD QL SMEAR: ABNORMAL
PMV BLD AUTO: 10.2 FL (ref 8.9–12.7)
PMV BLD AUTO: 10.2 FL (ref 8.9–12.7)
PMV BLD AUTO: 11.6 FL (ref 8.9–12.7)
PMV BLD AUTO: 11.6 FL (ref 8.9–12.7)
POTASSIUM SERPL-SCNC: 3.9 MMOL/L (ref 3.5–5.3)
POTASSIUM SERPL-SCNC: 3.9 MMOL/L (ref 3.5–5.3)
POTASSIUM SERPL-SCNC: 5 MMOL/L (ref 3.5–5.3)
POTASSIUM SERPL-SCNC: 5 MMOL/L (ref 3.5–5.3)
PROT SERPL-MCNC: 5.7 G/DL (ref 6.4–8.4)
PROT SERPL-MCNC: 5.7 G/DL (ref 6.4–8.4)
PROT SERPL-MCNC: 5.8 G/DL (ref 6.4–8.4)
PROT SERPL-MCNC: 5.8 G/DL (ref 6.4–8.4)
QRS AXIS: 98 DEGREES
QRSD INTERVAL: 96 MS
QT INTERVAL: 432 MS
QTC INTERVAL: 459 MS
RBC # BLD AUTO: 2.84 MILLION/UL (ref 3.88–5.62)
RBC MORPH BLD: PRESENT
RBC MORPH BLD: PRESENT
SODIUM SERPL-SCNC: 130 MMOL/L (ref 135–147)
SODIUM SERPL-SCNC: 130 MMOL/L (ref 135–147)
SODIUM SERPL-SCNC: 134 MMOL/L (ref 135–147)
SODIUM SERPL-SCNC: 134 MMOL/L (ref 135–147)
T WAVE AXIS: 95 DEGREES
VENTRICULAR RATE: 68 BPM
WBC # BLD AUTO: 5.91 THOUSAND/UL (ref 4.31–10.16)
WBC # BLD AUTO: 5.91 THOUSAND/UL (ref 4.31–10.16)
WBC # BLD AUTO: 8.61 THOUSAND/UL (ref 4.31–10.16)
WBC # BLD AUTO: 8.61 THOUSAND/UL (ref 4.31–10.16)

## 2025-05-06 PROCEDURE — NC001 PR NO CHARGE: Performed by: INTERNAL MEDICINE

## 2025-05-06 PROCEDURE — 85027 COMPLETE CBC AUTOMATED: CPT

## 2025-05-06 PROCEDURE — 83735 ASSAY OF MAGNESIUM: CPT

## 2025-05-06 PROCEDURE — 99232 SBSQ HOSP IP/OBS MODERATE 35: CPT | Performed by: INTERNAL MEDICINE

## 2025-05-06 PROCEDURE — 93010 ELECTROCARDIOGRAM REPORT: CPT | Performed by: INTERNAL MEDICINE

## 2025-05-06 PROCEDURE — 80053 COMPREHEN METABOLIC PANEL: CPT

## 2025-05-06 PROCEDURE — 84484 ASSAY OF TROPONIN QUANT: CPT

## 2025-05-06 PROCEDURE — 85007 BL SMEAR W/DIFF WBC COUNT: CPT

## 2025-05-06 PROCEDURE — 99232 SBSQ HOSP IP/OBS MODERATE 35: CPT | Performed by: NURSE PRACTITIONER

## 2025-05-06 PROCEDURE — 82948 REAGENT STRIP/BLOOD GLUCOSE: CPT

## 2025-05-06 PROCEDURE — 83605 ASSAY OF LACTIC ACID: CPT

## 2025-05-06 RX ORDER — LORAZEPAM 2 MG/ML
4 INJECTION INTRAMUSCULAR ONCE
Status: COMPLETED | OUTPATIENT
Start: 2025-05-06 | End: 2025-05-06

## 2025-05-06 RX ORDER — LORAZEPAM 1 MG/1
2 TABLET ORAL ONCE
Status: COMPLETED | OUTPATIENT
Start: 2025-05-06 | End: 2025-05-06

## 2025-05-06 RX ORDER — SACCHAROMYCES BOULARDII 250 MG
250 CAPSULE ORAL 2 TIMES DAILY
Status: DISCONTINUED | OUTPATIENT
Start: 2025-05-06 | End: 2025-05-09 | Stop reason: HOSPADM

## 2025-05-06 RX ORDER — MAGNESIUM SULFATE HEPTAHYDRATE 40 MG/ML
2 INJECTION, SOLUTION INTRAVENOUS ONCE
Status: COMPLETED | OUTPATIENT
Start: 2025-05-06 | End: 2025-05-06

## 2025-05-06 RX ORDER — PHENOBARBITAL SODIUM 65 MG/ML
130 INJECTION, SOLUTION INTRAMUSCULAR; INTRAVENOUS ONCE
Status: COMPLETED | OUTPATIENT
Start: 2025-05-06 | End: 2025-05-06

## 2025-05-06 RX ADMIN — NALTREXONE HYDROCHLORIDE 50 MG: 50 TABLET, FILM COATED ORAL at 08:15

## 2025-05-06 RX ADMIN — SODIUM CHLORIDE, SODIUM GLUCONATE, SODIUM ACETATE, POTASSIUM CHLORIDE, MAGNESIUM CHLORIDE, SODIUM PHOSPHATE, DIBASIC, AND POTASSIUM PHOSPHATE 75 ML/HR: .53; .5; .37; .037; .03; .012; .00082 INJECTION, SOLUTION INTRAVENOUS at 21:45

## 2025-05-06 RX ADMIN — Medication 400 MG: at 08:14

## 2025-05-06 RX ADMIN — NICOTINE 1 PATCH: 21 PATCH, EXTENDED RELEASE TRANSDERMAL at 08:15

## 2025-05-06 RX ADMIN — GABAPENTIN 300 MG: 300 CAPSULE ORAL at 15:35

## 2025-05-06 RX ADMIN — MAGNESIUM SULFATE HEPTAHYDRATE 2 G: 40 INJECTION, SOLUTION INTRAVENOUS at 09:11

## 2025-05-06 RX ADMIN — ATORVASTATIN CALCIUM 40 MG: 40 TABLET, FILM COATED ORAL at 08:14

## 2025-05-06 RX ADMIN — APIXABAN 5 MG: 5 TABLET, FILM COATED ORAL at 17:33

## 2025-05-06 RX ADMIN — Medication 250 MG: at 17:33

## 2025-05-06 RX ADMIN — ALBUTEROL SULFATE 2 PUFF: 90 AEROSOL, METERED RESPIRATORY (INHALATION) at 08:15

## 2025-05-06 RX ADMIN — METOPROLOL SUCCINATE 150 MG: 50 TABLET, EXTENDED RELEASE ORAL at 08:14

## 2025-05-06 RX ADMIN — RANOLAZINE 500 MG: 500 TABLET, FILM COATED, EXTENDED RELEASE ORAL at 17:33

## 2025-05-06 RX ADMIN — LORAZEPAM 4 MG: 2 INJECTION INTRAMUSCULAR; INTRAVENOUS at 17:35

## 2025-05-06 RX ADMIN — GABAPENTIN 300 MG: 300 CAPSULE ORAL at 08:13

## 2025-05-06 RX ADMIN — FERROUS SULFATE TAB 325 MG (65 MG ELEMENTAL FE) 325 MG: 325 (65 FE) TAB at 08:14

## 2025-05-06 RX ADMIN — FOLIC ACID 1 MG: 1 TABLET ORAL at 08:14

## 2025-05-06 RX ADMIN — TAMSULOSIN HYDROCHLORIDE 0.4 MG: 0.4 CAPSULE ORAL at 15:35

## 2025-05-06 RX ADMIN — RANOLAZINE 500 MG: 500 TABLET, FILM COATED, EXTENDED RELEASE ORAL at 08:13

## 2025-05-06 RX ADMIN — Medication 250 MG: at 12:10

## 2025-05-06 RX ADMIN — Medication 100 MG: at 08:14

## 2025-05-06 RX ADMIN — DILTIAZEM HYDROCHLORIDE 120 MG: 60 CAPSULE, EXTENDED RELEASE ORAL at 08:15

## 2025-05-06 RX ADMIN — ASPIRIN 81 MG CHEWABLE TABLET 81 MG: 81 TABLET CHEWABLE at 08:13

## 2025-05-06 RX ADMIN — LORAZEPAM 2 MG: 1 TABLET ORAL at 15:35

## 2025-05-06 RX ADMIN — APIXABAN 5 MG: 5 TABLET, FILM COATED ORAL at 08:14

## 2025-05-06 RX ADMIN — PHENOBARBITAL SODIUM 260 MG: 130 INJECTION INTRAMUSCULAR; INTRAVENOUS at 18:57

## 2025-05-06 RX ADMIN — LORAZEPAM 4 MG: 2 INJECTION INTRAMUSCULAR; INTRAVENOUS at 16:40

## 2025-05-06 RX ADMIN — PANTOPRAZOLE SODIUM 40 MG: 40 TABLET, DELAYED RELEASE ORAL at 05:46

## 2025-05-06 RX ADMIN — PHENOBARBITAL SODIUM 130 MG: 65 INJECTION INTRAMUSCULAR; INTRAVENOUS at 23:23

## 2025-05-06 RX ADMIN — PYRIDOXINE HCL TAB 50 MG 100 MG: 50 TAB at 08:14

## 2025-05-06 NOTE — ASSESSMENT & PLAN NOTE
Admits to drinking approximately 1 pint of liquor per day  Blood ethanol level 371 on admission  CIWA protocol while inpatient

## 2025-05-06 NOTE — CONSULTS
Consultation - Critical Care/ICU   Name: Valentín Can 65 y.o. male I MRN: [id number]  Unit/Bed#: ICU 15 I Date of Admission: 5/4/2025   Date of Service: 5/7/2025 I Hospital Day: 2   Consults  Physician Requesting Evaluation: Daniel Kemp MD   Reason for Evaluation / Principal Problem: Rapid Response called for seizure activity    Assessment & Plan  Abnormal EKG    Fall  Fall with headstrike on thinners. Evaluated by trauma and signed off.  Alcohol abuse  History of alcohol abuse    -CIWA protocol  -Phenobarbital as needed  -Thiamine  -Folic acid  -Vitamin B6  Electrolyte abnormality  Monitor electrolytes and replete as needed.  Hypertension  Home medications of cardizem and metoprolol.    -Will resume as appropriate based on BP   CAD (coronary artery disease)  Hx of CAD on ASA    -Will resume as appropriate  A-fib (HCC)  Hx of Afib, on Eliquis and metoprolol    -EKG  -Troponin  -Will resume meds as appropriate  COPD (chronic obstructive pulmonary disease) (HCC)  Hx of COPD    -PRN albuterol   Pancytopenia (HCC)    Diarrhea        Assessment & Plan  Abnormal EKG    Fall    Alcohol abuse    Electrolyte abnormality    Hypertension    CAD (coronary artery disease)    A-fib (HCC)    COPD (chronic obstructive pulmonary disease) (HCC)    Pancytopenia (HCC)    Diarrhea    Disposition: Stepdown Level 1    History of Present Illness   Valentín Can is a 65 y.o. with PMH of alcohol abuse and A fib on Eliquis who presents after a fall at home with head strike. Patient was evaluated by trauma upon arrival to the hospital and admitted for management of his A fib with RVR. Rapid response was called on 5/6 for seizure like activity with increasing CIWA score.  Patient planned for repeat labs and transfer to ICU for monitoring.    History obtained from chart review.  Review of Systems: See HPI for Review of Systems    Historical Information   No past medical history on file. No past surgical history on file.   Current  Outpatient Medications   Medication Instructions    albuterol (PROVENTIL HFA,VENTOLIN HFA) 90 mcg/act inhaler 2 puffs, Every 6 hours PRN    aluminum-magnesium hydroxide 200-200 MG/5ML suspension 5 mL, Every 6 hours PRN    apixaban (ELIQUIS) 5 mg, 2 times daily    aspirin 81 mg, Daily    atorvastatin (LIPITOR) 40 mg, Daily    diltiazem (CARDIZEM SR) 120 mg, 2 times daily    ferrous sulfate 325 mg, Daily with breakfast    fluticasone-vilanterol 200-25 mcg/actuation inhaler 1 puff, Daily    folic acid (FOLVITE) 1 mg tablet Daily    gabapentin (NEURONTIN) 300 mg, 3 times daily    magnesium oxide-pyridoxine (BEELITH) 362-20 MG TABS 1 tablet, Daily    metFORMIN (GLUCOPHAGE) 500 mg, 2 times daily with meals    metoprolol succinate (TOPROL-XL) 150 mg, Oral, 2 times daily    naltrexone (REVIA) 50 mg, Daily    nicotine (NICODERM CQ) 21 mg/24 hr TD 24 hr patch 1 patch, Every 24 hours    pantoprazole (PROTONIX) 40 mg, Daily    pyridoxine (B-6) 100 mg, Daily    ranolazine (RANEXA) 500 mg, 2 times daily    saccharomyces boulardii (FLORASTOR) 250 mg, 2 times daily    tamsulosin (FLOMAX) 0.4 mg, Daily with dinner    Thiamine Mononitrate (VITAMIN B1) 100 mg, Daily    No Known Allergies     History reviewed. No pertinent family history.       Objective :                   Vitals I/O      Most Recent Min/Max in 24hrs   Temp (!) 100.8 °F (38.2 °C) Temp  Min: 98.1 °F (36.7 °C)  Max: 100.8 °F (38.2 °C)   Pulse 78 Pulse  Min: 51  Max: 93   Resp (!) 24 Resp  Min: 24  Max: 24   /77 BP  Min: 100/54  Max: 151/91   O2 Sat 98 % SpO2  Min: 86 %  Max: 98 %      Intake/Output Summary (Last 24 hours) at 5/7/2025 0003  Last data filed at 5/6/2025 1700  Gross per 24 hour   Intake 720 ml   Output 375 ml   Net 345 ml       Diet NPO    Invasive Monitoring           Physical Exam   Physical Exam  Vitals and nursing note reviewed.   Eyes:      Conjunctiva/sclera: Conjunctivae normal.      Pupils: Pupils are equal, round, and reactive to light.    Skin:     General: Skin is warm and dry.   HENT:      Head: Normocephalic and atraumatic.      Right Ear: Hearing normal.      Left Ear: Hearing normal.      Mouth/Throat:      Mouth: Mucous membranes are moist.   Cardiovascular:      Rate and Rhythm: Normal rate and regular rhythm.      Heart sounds: Normal heart sounds.   Musculoskeletal:         General: Normal range of motion.   Abdominal: General: Bowel sounds are normal. There is no distension.      Palpations: Abdomen is soft.      Tenderness: There is no abdominal tenderness.   Constitutional:       General: He is not in acute distress.     Appearance: He is well-developed and well-nourished. He is ill-appearing.   Pulmonary:      Effort: Pulmonary effort is normal. No accessory muscle usage, respiratory distress or accessory muscle usage.      Breath sounds: Normal breath sounds. No stridor. No wheezing, rhonchi or rales.   Neurological:      General: No focal deficit present.      Mental Status: He is somnolent.      Motor: gross motor function is at baseline for patient. Strength full and intact in all extremities.      Comments: Tremulousness of the BL upper extremities noted          Diagnostic Studies        Lab Results: I have reviewed the following results:     Medications:  Scheduled PRN   apixaban, 5 mg, BID  aspirin, 81 mg, Daily  atorvastatin, 40 mg, Daily  diltiazem, 120 mg, BID  ferrous sulfate, 325 mg, Daily With Breakfast  fluticasone-vilanterol, 1 puff, Daily  folic acid 1 mg in sodium chloride 0.9 % 50 mL IVPB, 1 mg, Daily  gabapentin, 300 mg, TID  insulin lispro, 1-6 Units, 4x Daily (AC & HS)  magnesium Oxide, 400 mg, Daily  metoprolol succinate, 150 mg, BID  naltrexone, 50 mg, Daily  nicotine, 1 patch, Q24H  pantoprazole, 40 mg, Early Morning  pyridoxine, 100 mg, Daily  ranolazine, 500 mg, BID  saccharomyces boulardii, 250 mg, BID  tamsulosin, 0.4 mg, Daily With Dinner  thiamine, 500 mg, Daily      albuterol, 2 puff, Q6H PRN        Continuous    multi-electrolyte, 75 mL/hr, Last Rate: 75 mL/hr (05/06/25 3382)         Labs:   CBC    Recent Labs     05/06/25  0450 05/06/25  1837   WBC 5.91 8.61   HGB 8.9* 8.8*   HCT 28.0* 27.7*   PLT 61* 64*     BMP    Recent Labs     05/06/25  0450 05/06/25  1837   SODIUM 134* 130*   K 3.9 5.0   CL 99 95*   CO2 26 24   AGAP 9 11   BUN 12 13   CREATININE 0.82 1.45*   CALCIUM 7.7* 7.7*       Coags    No recent results     Additional Electrolytes  Recent Labs     05/05/25 0422 05/06/25  0450   MG 2.1 1.4*   CAIONIZED 1.01*  --           Blood Gas    No recent results  No recent results LFTs  Recent Labs     05/06/25  0450 05/06/25  1837   ALT 23 26   AST 49* 65*   ALKPHOS 112* 119*   ALB 2.9* 3.0*   TBILI 0.80 1.19*       Infectious  No recent results  Glucose  Recent Labs     05/05/25 0422 05/06/25  0450 05/06/25  1837   GLUC 86 104 127

## 2025-05-06 NOTE — ASSESSMENT & PLAN NOTE
Pt reports having diarrhea with each episode of binge drinking. However pt denied having diarrhea yesterday and now endorses new onset diarrhea (non-bloody). Denies abdominal pain. Afebrile, no leucocytosis. Per chart review, pt had been on ABX within the past month.    Plan:  C diff pending  Monitor electrolytes, replenish as indicated   Florastor probiotics

## 2025-05-06 NOTE — ASSESSMENT & PLAN NOTE
Presented s/p fall which occurred at home while intoxicated with head strike currently on aspirin and Plavix  Trauma workup negative  EKG reveals rate controlled A-fib with what appear to be ST depressions in leads II, V3, V4, V5, and V6, these changes appear to be present on EKG done in December however some prior EKGs do not reveal presence of ST changes  Initial troponin 24, 2h 21  Has history with chronic angina, underwent pharmacologic stress testing in 2022 which was negative.  Has history of CAD s/p CABG in 2004 and PCI to the LAD in 2014  Prior echo from 8/2024 revealed EF 65%, with mild to moderate concentric hypertrophy, severe left atrial dilation, mild to moderate tricuspid and mitral valve regurgitation  Currently no symptoms of ACS such as chest pain, shortness of breath, or diaphoresis   Suspect EKG changes could be due to increased demand with combination of A-fib, dehydration, and alcohol intoxication with fall    Plan:  Monitor on telemetry

## 2025-05-06 NOTE — PROGRESS NOTES
Progress Note - Hospitalist   Name: Valentín Can 65 y.o. male I MRN: [id number]  Unit/Bed#: S -01 I Date of Admission: 5/4/2025   Date of Service: 5/6/2025 I Hospital Day: 1    Assessment & Plan  Abnormal EKG  Presented s/p fall which occurred at home while intoxicated with head strike currently on aspirin and Plavix  Trauma workup negative  EKG reveals rate controlled A-fib with what appear to be ST depressions in leads II, V3, V4, V5, and V6, these changes appear to be present on EKG done in December however some prior EKGs do not reveal presence of ST changes  Initial troponin 24, 2h 21  Has history with chronic angina, underwent pharmacologic stress testing in 2022 which was negative.  Has history of CAD s/p CABG in 2004 and PCI to the LAD in 2014  Prior echo from 8/2024 revealed EF 65%, with mild to moderate concentric hypertrophy, severe left atrial dilation, mild to moderate tricuspid and mitral valve regurgitation  Currently no symptoms of ACS such as chest pain, shortness of breath, or diaphoresis   Suspect EKG changes could be due to increased demand with combination of A-fib, dehydration, and alcohol intoxication with fall    Plan:  Monitor on telemetry  Diarrhea  Pt reports having diarrhea with each episode of binge drinking. However pt denied having diarrhea yesterday and now endorses new onset diarrhea (non-bloody). Denies abdominal pain. Afebrile, no leucocytosis. Per chart review, pt had been on ABX within the past month.    Plan:  C diff pending  Monitor electrolytes, replenish as indicated   Florastor probiotics  Fall  Presented s/p unwitnessed fall while intoxicated which occurred at home  Has history of multiple falls while intoxicated requiring admission  Trauma workup negative see plan above    Plan:  PT/OT eval pending  Electrolyte abnormality  Multi electrolyte abnormality on admission (which were replenished)  Potassium 3.4  Calcium 7.3  Magnesium 1.2  Hypomagnesemia this  AM    Plan:  Monitor daily electrolytes, replenish as needed  Alcohol abuse  Admits to drinking approximately 1 pint of liquor per day  Blood ethanol level 371 on admission  CIWA protocol while inpatient  Pancytopenia ([facility])  WBC 3.4, hemoglobin 10.2, platelet 79  Appears to be chronic and most likely related to chronic alcoholism  Monitor daily CBC  Hypertension  Takes Cardizem 120 mg twice daily, and metoprolol 150 gram twice daily at home  Episodes of hypotension in the ED down to 64/37 with improvement s/p IV fluids    Plan:  Hold antihypertensives for now until stabilization of BP's  S/p bolus IV fluids, will continue with maintenance IV fluids  CAD (coronary artery disease)  History of CAD s/p CABG in 2004 and PCI to left main coronary artery in 2014  Currently on aspirin    Plan:  Continue aspirin while inpatient, see plan above under abnormal EKG  A-fib ([facility])  A-fib on Eliquis as well as Cardizem and metoprolol currently rate controlled    Plan:  Continue Eliquis, hold Cardizem and metoprolol given low blood pressures  COPD (chronic obstructive pulmonary disease) ([facility])  Continue home albuterol and Breo Ellipta inhalers while inpatient   Has supplemental oxygen at home which he claims is mostly noncompliant with however uses if needed  Continue oxygen supplementation as needed    VTE Pharmacologic Prophylaxis: VTE Score: 5 High Risk (Score >/= 5) - Pharmacological DVT Prophylaxis Ordered: apixaban (Eliquis). Sequential Compression Devices Ordered.    Mobility:   Basic Mobility Inpatient Raw Score: 21  JH-HLM Goal: 6: Walk 10 steps or more  JH-HLM Achieved: 6: Walk 10 steps or more  JH-HLM Goal achieved. Continue to encourage appropriate mobility.    Patient Centered Rounds: I performed bedside rounds with nursing staff today.   Discussions with Specialists or Other Care Team Provider: N/A    Education and Discussions with Family / Patient: Patient declined call to .     Current Length of Stay: 1  day(s)  Current Patient Status: Inpatient   Certification Statement: The patient will continue to require additional inpatient hospital stay due to electrolyte abnormalities, diarrhea.  Discharge Plan: Anticipate discharge tomorrow to previous living arrangement.     Code Status: Level 1 - Full Code    Subjective   Pt seen and evaluated at bedside this morning. Denies any tremulousness, diaphoresis, palpitations. Per MAR, pt received lorazepam 5/5 PO 2 mg at 1543, 2220. He denies any concerns presently including abdominal pain, N/V. Denies chest pain.     Objective :  Temp:  [98.1 °F (36.7 °C)-98.9 °F (37.2 °C)] 98.5 °F (36.9 °C)  HR:  [69-93] 69  BP: (121-154)/(69-91) 121/79  Resp:  [18] 18  SpO2:  [86 %-95 %] 86 %  O2 Device: None (Room air)    There is no height or weight on file to calculate BMI.     Input and Output Summary (last 24 hours):     Intake/Output Summary (Last 24 hours) at 5/6/2025 1456  Last data filed at 5/6/2025 1200  Gross per 24 hour   Intake 820 ml   Output 725 ml   Net 95 ml       Physical Exam  Vitals and nursing note reviewed.   Constitutional:       General: He is not in acute distress.     Appearance: He is well-developed.   HENT:      Head: Normocephalic.      Right Ear: External ear normal.      Left Ear: External ear normal.   Eyes:      Extraocular Movements: Extraocular movements intact.      Conjunctiva/sclera: Conjunctivae normal.   Cardiovascular:      Rate and Rhythm: Normal rate and regular rhythm.      Heart sounds: No murmur heard.  Pulmonary:      Effort: Pulmonary effort is normal. No respiratory distress.      Breath sounds: Normal breath sounds.   Chest:      Chest wall: No tenderness.   Abdominal:      Palpations: Abdomen is soft.      Tenderness: There is no abdominal tenderness.   Musculoskeletal:         General: No swelling.      Cervical back: Neck supple.   Skin:     General: Skin is warm and dry.      Capillary Refill: Capillary refill takes less than 2 seconds.       Findings: Bruising present.   Neurological:      Mental Status: He is alert and oriented to person, place, and time.   Psychiatric:         Mood and Affect: Mood normal.           Lines/Drains:        Telemetry:  Telemetry Orders (From admission, onward)               24 Hour Telemetry Monitoring  Continuous x 24 Hours (Telem)        Expiring   Question:  Reason for 24 Hour Telemetry  Answer:  Alcohol withdrawal and CIWA >7, electrolyte abnormalities, abnormal ECG and/or heart disease                     Telemetry Reviewed: Normal Sinus Rhythm  Indication for Continued Telemetry Use: Metabolic/electrolyte disturbance with high probability of dysrhythmia               Lab Results: I have reviewed the following results:   Results from last 7 days   Lab Units 05/06/25  0450 05/05/25  0422   WBC Thousand/uL 5.91 3.47*   HEMOGLOBIN g/dL 8.9* 9.3*   HEMATOCRIT % 28.0* 29.1*   PLATELETS Thousands/uL 61* 73*   SEGS PCT %  --  42*   LYMPHO PCT %  --  39   MONO PCT %  --  14*   EOS PCT %  --  3     Results from last 7 days   Lab Units 05/06/25  0450   SODIUM mmol/L 134*   POTASSIUM mmol/L 3.9   CHLORIDE mmol/L 99   CO2 mmol/L 26   BUN mg/dL 12   CREATININE mg/dL 0.82   ANION GAP mmol/L 9   CALCIUM mg/dL 7.7*   ALBUMIN g/dL 2.9*   TOTAL BILIRUBIN mg/dL 0.80   ALK PHOS U/L 112*   ALT U/L 23   AST U/L 49*   GLUCOSE RANDOM mg/dL 104         Results from last 7 days   Lab Units 05/06/25  1133 05/06/25  0756 05/05/25  2107 05/05/25  1619 05/05/25  1134 05/05/25  0745 05/05/25  0706   POC GLUCOSE mg/dl 148* 121 136 137 125 102 83     Results from last 7 days   Lab Units 05/05/25  0224   HEMOGLOBIN A1C % 5.2     Results from last 7 days   Lab Units 05/05/25  0224   LACTIC ACID mmol/L 0.9       Recent Cultures (last 7 days):         Imaging Results Review: I reviewed radiology reports from this admission including: chest xray, CT chest, CT abdomen/pelvis, and CT C-spine.  Other Study Results Review: EKG was reviewed.     Last 24  Hours Medication List:     Current Facility-Administered Medications:     albuterol (PROVENTIL HFA,VENTOLIN HFA) inhaler 2 puff, Q6H PRN    apixaban (ELIQUIS) tablet 5 mg, BID    aspirin chewable tablet 81 mg, Daily    atorvastatin (LIPITOR) tablet 40 mg, Daily    diltiazem (CARDIZEM SR) 12 hr capsule 120 mg, BID    ferrous sulfate tablet 325 mg, Daily With Breakfast    fluticasone-vilanterol 200-25 mcg/actuation 1 puff, Daily    folic acid (FOLVITE) tablet 1 mg, Daily    gabapentin (NEURONTIN) capsule 300 mg, TID    insulin lispro (HumALOG/ADMELOG) 100 units/mL subcutaneous injection 1-6 Units, 4x Daily (AC & HS) **AND** Fingerstick Glucose (POCT), 4 times day    magnesium Oxide (MAG-OX) tablet 400 mg, Daily    metoprolol succinate (TOPROL-XL) 24 hr tablet 150 mg, BID    multi-electrolyte (Plasmalyte-A/Isolyte-S PH 7.4/Normosol-R) IV solution, Continuous, Last Rate: 75 mL/hr (05/05/25 1613)    naltrexone (REVIA) tablet 50 mg, Daily    nicotine (NICODERM CQ) 21 mg/24 hr TD 24 hr patch 1 patch, Q24H    pantoprazole (PROTONIX) EC tablet 40 mg, Early Morning    pyridoxine (VITAMIN B6) tablet 100 mg, Daily    ranolazine (RANEXA) 12 hr tablet 500 mg, BID    saccharomyces boulardii (FLORASTOR) capsule 250 mg, BID    tamsulosin (FLOMAX) capsule 0.4 mg, Daily With Dinner    thiamine tablet 100 mg, Daily    Administrative Statements   Today, Patient Was Seen By: Maria Fernanda Skinner DO  PGY-I      **Please Note: This note may have been constructed using a voice recognition system.**

## 2025-05-06 NOTE — ASSESSMENT & PLAN NOTE
WBC 3.4, hemoglobin 10.2, platelet 79  Appears to be chronic and most likely related to chronic alcoholism  Monitor daily CBC   Dressing (No Sutures): dry sterile dressing

## 2025-05-06 NOTE — RAPID RESPONSE
Rapid Response Note  Valentín Can 65 y.o. male MRN: [id number]  Unit/Bed#: S -01 Encounter: [id number]    Rapid Response Notification(s):   Response called date/time:  5/6/2025 5:43 PM  Response team arrival date/time:  5/6/2025 5:45 PM  Response end date/time:  5/6/2025 6:00 PM  Level of care:  Medsur  [facility]  Primary reason for rapid response call:  New onset of seizures    Rapid Response Intervention(s):   Airway:  None  Breathing:  None  Circulation:  None  Fluids administered:  None  Medications administered:  Other (comment) (phenobarbital)       Assessment:   Seizure activity, concern for ETOH withdrawal seizure     Plan:   Given 260mg phenobarbital given increasing benzo requirement for elevated CIWA over the past several hours and seizure despite 4mg ativan prior to RR  Check electrolytes and endpoints   Obtain CTH   Transfer to [facility] [place]      Rapid Response Outcome:   Transfer:  Transfer to stepdown 1  Primary service notified of transfer: Yes    Code Status: Level 1 (Full Code)      Family notified: Yes, Name of Family member contacted Son Valentín - left message with return instructions          Background/Situation:   Valentín Can is a 65 y.o. male RR called for seizure like activity. HE has PMH ETOH abuse, admitted 5/4 with AF/RVR likely related to med non compliance. Last drink 5/4. He was started on CIWA protocol. This afternoon he developed worsening confusion, tremor, hallucination with elevated CIWA requiring increasing doses of benzos. Per nursing he had just received 4mg ativan and shortly after developed tonic/clonic jerking concerning for seizure. This activity stopped by the time I arrived at bedside. Patient does appear post ictal on exam. Given increased dosing of benzos over the last several hours with concern for ETOH withdrawal seizure, will given 260 phenobarb and transfer for [facility] [place] for further monitoring and management.    Review of Systems   Unable  to perform ROS: Mental status change       Objective:   Vitals:    05/06/25 1746 05/06/25 1747 05/06/25 1750 05/06/25 1753   BP: 109/55      Pulse: 55 55 60 (!) 51   Resp:       Temp:       TempSrc:       SpO2: 97% 97% 97% 96%   Weight:         Physical Exam  Constitutional:       Appearance: He is ill-appearing.   HENT:      Head: Normocephalic.   Eyes:      Pupils: Pupils are equal, round, and reactive to light.   Cardiovascular:      Rate and Rhythm: Normal rate and regular rhythm.   Pulmonary:      Effort: Pulmonary effort is normal.   Abdominal:      General: There is distension.      Palpations: Abdomen is soft.      Tenderness: There is no abdominal tenderness.   Skin:     General: Skin is warm and dry.   Neurological:      Comments: Opens eyes to voice, lethargic, spontaneous movement of all extremities

## 2025-05-06 NOTE — RAPID RESPONSE
Respiratory responding to rapid response. 3 LPM nasal cannula applied to pt. Respiratory dismissed by Jocelynn Robertson

## 2025-05-06 NOTE — ASSESSMENT & PLAN NOTE
Multi electrolyte abnormality on admission (which were replenished)  Potassium 3.4  Calcium 7.3  Magnesium 1.2  Hypomagnesemia this AM    Plan:  Monitor daily electrolytes, replenish as needed

## 2025-05-06 NOTE — ASSESSMENT & PLAN NOTE
Continue home albuterol and Breo Ellipta inhalers while inpatient   Has supplemental oxygen at home which he claims is mostly noncompliant with however uses if needed  Continue oxygen supplementation as needed

## 2025-05-06 NOTE — ASSESSMENT & PLAN NOTE
Presented s/p unwitnessed fall while intoxicated which occurred at home  Has history of multiple falls while intoxicated requiring admission  Trauma workup negative see plan above    Plan:  PT/OT eval pending

## 2025-05-07 ENCOUNTER — APPOINTMENT (INPATIENT)
Dept: RADIOLOGY | Facility: HOSPITAL | Age: 65
DRG: 308 | End: 2025-05-07
Payer: COMMERCIAL

## 2025-05-07 PROBLEM — F10.939 ALCOHOL WITHDRAWAL (HCC): Status: ACTIVE | Noted: 2025-05-07

## 2025-05-07 PROBLEM — N17.9 AKI (ACUTE KIDNEY INJURY) (HCC): Status: ACTIVE | Noted: 2025-05-07

## 2025-05-07 LAB
ALBUMIN SERPL BCG-MCNC: 3.2 G/DL (ref 3.5–5)
ALBUMIN SERPL BCG-MCNC: 3.2 G/DL (ref 3.5–5)
ALP SERPL-CCNC: 114 U/L (ref 34–104)
ALP SERPL-CCNC: 114 U/L (ref 34–104)
ALT SERPL W P-5'-P-CCNC: 24 U/L (ref 7–52)
ALT SERPL W P-5'-P-CCNC: 24 U/L (ref 7–52)
ANION GAP SERPL CALCULATED.3IONS-SCNC: 8 MMOL/L (ref 4–13)
ANION GAP SERPL CALCULATED.3IONS-SCNC: 8 MMOL/L (ref 4–13)
ANION GAP SERPL CALCULATED.3IONS-SCNC: 9 MMOL/L (ref 4–13)
ANION GAP SERPL CALCULATED.3IONS-SCNC: 9 MMOL/L (ref 4–13)
ANISOCYTOSIS BLD QL SMEAR: PRESENT
ANISOCYTOSIS BLD QL SMEAR: PRESENT
AST SERPL W P-5'-P-CCNC: 56 U/L (ref 13–39)
AST SERPL W P-5'-P-CCNC: 56 U/L (ref 13–39)
BACTERIA BLD CULT: NORMAL
BACTERIA BLD CULT: NORMAL
BASOPHILS # BLD MANUAL: 0 THOUSAND/UL (ref 0–0.1)
BASOPHILS # BLD MANUAL: 0 THOUSAND/UL (ref 0–0.1)
BASOPHILS NFR MAR MANUAL: 0 % (ref 0–1)
BASOPHILS NFR MAR MANUAL: 0 % (ref 0–1)
BILIRUB SERPL-MCNC: 1.09 MG/DL (ref 0.2–1)
BILIRUB SERPL-MCNC: 1.09 MG/DL (ref 0.2–1)
BUN SERPL-MCNC: 15 MG/DL (ref 5–25)
CALCIUM ALBUM COR SERPL-MCNC: 8.5 MG/DL (ref 8.3–10.1)
CALCIUM ALBUM COR SERPL-MCNC: 8.5 MG/DL (ref 8.3–10.1)
CALCIUM SERPL-MCNC: 7.5 MG/DL (ref 8.4–10.2)
CALCIUM SERPL-MCNC: 7.5 MG/DL (ref 8.4–10.2)
CALCIUM SERPL-MCNC: 7.9 MG/DL (ref 8.4–10.2)
CALCIUM SERPL-MCNC: 7.9 MG/DL (ref 8.4–10.2)
CHLORIDE SERPL-SCNC: 96 MMOL/L (ref 96–108)
CHLORIDE SERPL-SCNC: 96 MMOL/L (ref 96–108)
CHLORIDE SERPL-SCNC: 98 MMOL/L (ref 96–108)
CHLORIDE SERPL-SCNC: 98 MMOL/L (ref 96–108)
CO2 SERPL-SCNC: 25 MMOL/L (ref 21–32)
CO2 SERPL-SCNC: 25 MMOL/L (ref 21–32)
CO2 SERPL-SCNC: 28 MMOL/L (ref 21–32)
CO2 SERPL-SCNC: 28 MMOL/L (ref 21–32)
CORTIS SERPL-MCNC: 14.9 UG/DL
CORTIS SERPL-MCNC: 14.9 UG/DL
CREAT SERPL-MCNC: 1.15 MG/DL (ref 0.6–1.3)
CREAT SERPL-MCNC: 1.15 MG/DL (ref 0.6–1.3)
CREAT SERPL-MCNC: 1.33 MG/DL (ref 0.6–1.3)
CREAT SERPL-MCNC: 1.33 MG/DL (ref 0.6–1.3)
EOSINOPHIL # BLD MANUAL: 0 THOUSAND/UL (ref 0–0.4)
EOSINOPHIL # BLD MANUAL: 0 THOUSAND/UL (ref 0–0.4)
EOSINOPHIL NFR BLD MANUAL: 0 % (ref 0–6)
EOSINOPHIL NFR BLD MANUAL: 0 % (ref 0–6)
ERYTHROCYTE [DISTWIDTH] IN BLOOD BY AUTOMATED COUNT: 20 % (ref 11.6–15.1)
ERYTHROCYTE [DISTWIDTH] IN BLOOD BY AUTOMATED COUNT: 20 % (ref 11.6–15.1)
GFR SERPL CREATININE-BSD FRML MDRD: 56 ML/MIN/1.73SQ M
GFR SERPL CREATININE-BSD FRML MDRD: 56 ML/MIN/1.73SQ M
GFR SERPL CREATININE-BSD FRML MDRD: 67 ML/MIN/1.73SQ M
GFR SERPL CREATININE-BSD FRML MDRD: 67 ML/MIN/1.73SQ M
GLUCOSE SERPL-MCNC: 100 MG/DL (ref 65–140)
GLUCOSE SERPL-MCNC: 100 MG/DL (ref 65–140)
GLUCOSE SERPL-MCNC: 109 MG/DL (ref 65–140)
GLUCOSE SERPL-MCNC: 109 MG/DL (ref 65–140)
GLUCOSE SERPL-MCNC: 171 MG/DL (ref 65–140)
GLUCOSE SERPL-MCNC: 171 MG/DL (ref 65–140)
GLUCOSE SERPL-MCNC: 94 MG/DL (ref 65–140)
GLUCOSE SERPL-MCNC: 94 MG/DL (ref 65–140)
GLUCOSE SERPL-MCNC: 95 MG/DL (ref 65–140)
GLUCOSE SERPL-MCNC: 95 MG/DL (ref 65–140)
GLUCOSE SERPL-MCNC: 96 MG/DL (ref 65–140)
GLUCOSE SERPL-MCNC: 96 MG/DL (ref 65–140)
GLUCOSE SERPL-MCNC: 98 MG/DL (ref 65–140)
GLUCOSE SERPL-MCNC: 98 MG/DL (ref 65–140)
HCT VFR BLD AUTO: 31.1 % (ref 36.5–49.3)
HCT VFR BLD AUTO: 31.1 % (ref 36.5–49.3)
HGB BLD-MCNC: 9.8 G/DL (ref 12–17)
HGB BLD-MCNC: 9.8 G/DL (ref 12–17)
LYMPHOCYTES # BLD AUTO: 1.18 THOUSAND/UL (ref 0.6–4.47)
LYMPHOCYTES # BLD AUTO: 1.18 THOUSAND/UL (ref 0.6–4.47)
LYMPHOCYTES # BLD AUTO: 11 % (ref 14–44)
LYMPHOCYTES # BLD AUTO: 11 % (ref 14–44)
MAGNESIUM SERPL-MCNC: 1.5 MG/DL (ref 1.9–2.7)
MAGNESIUM SERPL-MCNC: 1.5 MG/DL (ref 1.9–2.7)
MCH RBC QN AUTO: 30.8 PG (ref 26.8–34.3)
MCH RBC QN AUTO: 30.8 PG (ref 26.8–34.3)
MCHC RBC AUTO-ENTMCNC: 31.5 G/DL (ref 31.4–37.4)
MCHC RBC AUTO-ENTMCNC: 31.5 G/DL (ref 31.4–37.4)
MCV RBC AUTO: 98 FL (ref 82–98)
MCV RBC AUTO: 98 FL (ref 82–98)
MONOCYTES # BLD AUTO: 0.75 THOUSAND/UL (ref 0–1.22)
MONOCYTES # BLD AUTO: 0.75 THOUSAND/UL (ref 0–1.22)
MONOCYTES NFR BLD: 7 % (ref 4–12)
MONOCYTES NFR BLD: 7 % (ref 4–12)
NEUTROPHILS # BLD MANUAL: 8.78 THOUSAND/UL (ref 1.85–7.62)
NEUTROPHILS # BLD MANUAL: 8.78 THOUSAND/UL (ref 1.85–7.62)
NEUTS BAND NFR BLD MANUAL: 4 % (ref 0–8)
NEUTS BAND NFR BLD MANUAL: 4 % (ref 0–8)
NEUTS SEG NFR BLD AUTO: 78 % (ref 43–75)
NEUTS SEG NFR BLD AUTO: 78 % (ref 43–75)
NRBC BLD AUTO-RTO: 1 /100 WBC (ref 0–2)
NRBC BLD AUTO-RTO: 1 /100 WBC (ref 0–2)
OSMOLALITY UR/SERPL-RTO: 284 MMOL/KG (ref 282–298)
OSMOLALITY UR/SERPL-RTO: 284 MMOL/KG (ref 282–298)
OSMOLALITY UR: 218 MMOL/KG (ref 250–900)
OSMOLALITY UR: 218 MMOL/KG (ref 250–900)
PHOSPHATE SERPL-MCNC: 2.1 MG/DL (ref 2.3–4.1)
PHOSPHATE SERPL-MCNC: 2.1 MG/DL (ref 2.3–4.1)
PLATELET # BLD AUTO: 70 THOUSANDS/UL (ref 149–390)
PLATELET # BLD AUTO: 70 THOUSANDS/UL (ref 149–390)
PLATELET BLD QL SMEAR: ABNORMAL
PLATELET BLD QL SMEAR: ABNORMAL
PMV BLD AUTO: 12.3 FL (ref 8.9–12.7)
PMV BLD AUTO: 12.3 FL (ref 8.9–12.7)
POTASSIUM SERPL-SCNC: 3.8 MMOL/L (ref 3.5–5.3)
POTASSIUM SERPL-SCNC: 3.8 MMOL/L (ref 3.5–5.3)
POTASSIUM SERPL-SCNC: 4.3 MMOL/L (ref 3.5–5.3)
POTASSIUM SERPL-SCNC: 4.3 MMOL/L (ref 3.5–5.3)
PROT SERPL-MCNC: 6.3 G/DL (ref 6.4–8.4)
PROT SERPL-MCNC: 6.3 G/DL (ref 6.4–8.4)
RBC # BLD AUTO: 3.18 MILLION/UL (ref 3.88–5.62)
RBC # BLD AUTO: 3.18 MILLION/UL (ref 3.88–5.62)
RBC MORPH BLD: PRESENT
RBC MORPH BLD: PRESENT
SODIUM SERPL-SCNC: 130 MMOL/L (ref 135–147)
SODIUM SERPL-SCNC: 130 MMOL/L (ref 135–147)
SODIUM SERPL-SCNC: 134 MMOL/L (ref 135–147)
SODIUM SERPL-SCNC: 134 MMOL/L (ref 135–147)
SODIUM UR-SCNC: 36 MMOL/L
SODIUM UR-SCNC: 36 MMOL/L
TSH SERPL DL<=0.05 MIU/L-ACNC: 1.3 UIU/ML (ref 0.45–4.5)
TSH SERPL DL<=0.05 MIU/L-ACNC: 1.3 UIU/ML (ref 0.45–4.5)
WBC # BLD AUTO: 10.71 THOUSAND/UL (ref 4.31–10.16)
WBC # BLD AUTO: 10.71 THOUSAND/UL (ref 4.31–10.16)

## 2025-05-07 PROCEDURE — 83930 ASSAY OF BLOOD OSMOLALITY: CPT | Performed by: INTERNAL MEDICINE

## 2025-05-07 PROCEDURE — 71045 X-RAY EXAM CHEST 1 VIEW: CPT

## 2025-05-07 PROCEDURE — 85007 BL SMEAR W/DIFF WBC COUNT: CPT

## 2025-05-07 PROCEDURE — 84300 ASSAY OF URINE SODIUM: CPT | Performed by: INTERNAL MEDICINE

## 2025-05-07 PROCEDURE — 80053 COMPREHEN METABOLIC PANEL: CPT | Performed by: NURSE PRACTITIONER

## 2025-05-07 PROCEDURE — 87493 C DIFF AMPLIFIED PROBE: CPT

## 2025-05-07 PROCEDURE — 82948 REAGENT STRIP/BLOOD GLUCOSE: CPT

## 2025-05-07 PROCEDURE — 82533 TOTAL CORTISOL: CPT | Performed by: INTERNAL MEDICINE

## 2025-05-07 PROCEDURE — 83735 ASSAY OF MAGNESIUM: CPT | Performed by: NURSE PRACTITIONER

## 2025-05-07 PROCEDURE — 83935 ASSAY OF URINE OSMOLALITY: CPT | Performed by: INTERNAL MEDICINE

## 2025-05-07 PROCEDURE — 85027 COMPLETE CBC AUTOMATED: CPT

## 2025-05-07 PROCEDURE — 99291 CRITICAL CARE FIRST HOUR: CPT | Performed by: INTERNAL MEDICINE

## 2025-05-07 PROCEDURE — 84100 ASSAY OF PHOSPHORUS: CPT

## 2025-05-07 PROCEDURE — 84443 ASSAY THYROID STIM HORMONE: CPT | Performed by: INTERNAL MEDICINE

## 2025-05-07 PROCEDURE — 80048 BASIC METABOLIC PNL TOTAL CA: CPT | Performed by: INTERNAL MEDICINE

## 2025-05-07 RX ORDER — MAGNESIUM SULFATE HEPTAHYDRATE 40 MG/ML
2 INJECTION, SOLUTION INTRAVENOUS ONCE
Status: COMPLETED | OUTPATIENT
Start: 2025-05-07 | End: 2025-05-07

## 2025-05-07 RX ORDER — ASPIRIN 81 MG/1
81 TABLET, CHEWABLE ORAL DAILY
Status: CANCELLED | OUTPATIENT
Start: 2025-05-08

## 2025-05-07 RX ORDER — DIAZEPAM 10 MG/2ML
2.5 INJECTION, SOLUTION INTRAMUSCULAR; INTRAVENOUS ONCE
Status: COMPLETED | OUTPATIENT
Start: 2025-05-07 | End: 2025-05-07

## 2025-05-07 RX ORDER — SODIUM CHLORIDE 9 MG/ML
100 INJECTION, SOLUTION INTRAVENOUS CONTINUOUS
Status: DISCONTINUED | OUTPATIENT
Start: 2025-05-07 | End: 2025-05-08

## 2025-05-07 RX ORDER — PHENOBARBITAL SODIUM 65 MG/ML
130 INJECTION, SOLUTION INTRAMUSCULAR; INTRAVENOUS ONCE
Status: COMPLETED | OUTPATIENT
Start: 2025-05-07 | End: 2025-05-07

## 2025-05-07 RX ORDER — POTASSIUM CHLORIDE 1500 MG/1
20 TABLET, EXTENDED RELEASE ORAL ONCE
Status: COMPLETED | OUTPATIENT
Start: 2025-05-07 | End: 2025-05-07

## 2025-05-07 RX ORDER — ACETAMINOPHEN 10 MG/ML
1000 INJECTION, SOLUTION INTRAVENOUS EVERY 6 HOURS PRN
Status: DISCONTINUED | OUTPATIENT
Start: 2025-05-07 | End: 2025-05-08

## 2025-05-07 RX ADMIN — FOLIC ACID 1 MG: 5 INJECTION, SOLUTION INTRAMUSCULAR; INTRAVENOUS; SUBCUTANEOUS at 08:48

## 2025-05-07 RX ADMIN — SODIUM CHLORIDE 100 ML/HR: 0.9 INJECTION, SOLUTION INTRAVENOUS at 12:28

## 2025-05-07 RX ADMIN — DILTIAZEM HYDROCHLORIDE 120 MG: 60 CAPSULE, EXTENDED RELEASE ORAL at 20:17

## 2025-05-07 RX ADMIN — Medication 250 MG: at 17:04

## 2025-05-07 RX ADMIN — INSULIN LISPRO 1 UNITS: 100 INJECTION, SOLUTION INTRAVENOUS; SUBCUTANEOUS at 22:54

## 2025-05-07 RX ADMIN — MAGNESIUM SULFATE HEPTAHYDRATE 2 G: 40 INJECTION, SOLUTION INTRAVENOUS at 07:09

## 2025-05-07 RX ADMIN — NICOTINE 1 PATCH: 21 PATCH, EXTENDED RELEASE TRANSDERMAL at 08:27

## 2025-05-07 RX ADMIN — APIXABAN 5 MG: 5 TABLET, FILM COATED ORAL at 17:04

## 2025-05-07 RX ADMIN — THIAMINE HYDROCHLORIDE 500 MG: 100 INJECTION, SOLUTION INTRAMUSCULAR; INTRAVENOUS at 01:19

## 2025-05-07 RX ADMIN — TAMSULOSIN HYDROCHLORIDE 0.4 MG: 0.4 CAPSULE ORAL at 17:04

## 2025-05-07 RX ADMIN — GABAPENTIN 300 MG: 300 CAPSULE ORAL at 17:04

## 2025-05-07 RX ADMIN — SODIUM PHOSPHATE, MONOBASIC, MONOHYDRATE AND SODIUM PHOSPHATE, DIBASIC, ANHYDROUS 30 MMOL: 276; 142 INJECTION, SOLUTION INTRAVENOUS at 07:40

## 2025-05-07 RX ADMIN — POTASSIUM PHOSPHATE, MONOBASIC AND POTASSIUM PHOSPHATE, DIBASIC 6 MMOL: 224; 236 INJECTION, SOLUTION, CONCENTRATE INTRAVENOUS at 09:39

## 2025-05-07 RX ADMIN — METOPROLOL SUCCINATE 150 MG: 50 TABLET, EXTENDED RELEASE ORAL at 20:17

## 2025-05-07 RX ADMIN — GABAPENTIN 300 MG: 300 CAPSULE ORAL at 20:17

## 2025-05-07 RX ADMIN — THIAMINE HYDROCHLORIDE 500 MG: 100 INJECTION, SOLUTION INTRAMUSCULAR; INTRAVENOUS at 08:01

## 2025-05-07 RX ADMIN — POTASSIUM CHLORIDE 20 MEQ: 1500 TABLET, EXTENDED RELEASE ORAL at 15:27

## 2025-05-07 RX ADMIN — ACETAMINOPHEN 1000 MG: 10 INJECTION INTRAVENOUS at 06:00

## 2025-05-07 RX ADMIN — RANOLAZINE 500 MG: 500 TABLET, FILM COATED, EXTENDED RELEASE ORAL at 17:04

## 2025-05-07 RX ADMIN — DIAZEPAM 2.5 MG: 10 INJECTION, SOLUTION INTRAMUSCULAR; INTRAVENOUS at 20:45

## 2025-05-07 RX ADMIN — PHENOBARBITAL SODIUM 130 MG: 65 INJECTION INTRAMUSCULAR; INTRAVENOUS at 03:07

## 2025-05-07 NOTE — ASSESSMENT & PLAN NOTE
Recent Labs     05/06/25  0450 05/06/25  1837 05/07/25  0513   SODIUM 134* 130* 130*      Recent Labs     05/05/25  0422 05/06/25  0450 05/07/25  0513   MG 2.1 1.4* 1.5*      Recent Labs     05/07/25  0513   PHOS 2.1*    Monitor electrolytes and replete as needed. IVF as needed

## 2025-05-07 NOTE — ASSESSMENT & PLAN NOTE
EKG reveals rate controlled A-fib with what appear to be ST depressions in leads II, V3, V4, V5, and V6, these changes appear to be present on EKG done in December however some prior EKGs do not reveal presence of ST changes  Initial troponin 24, 2h 21  Has history with chronic angina, underwent pharmacologic stress testing in 2022 which was negative.  Has history of CAD s/p CABG in 2004 and PCI to the LAD in 2014  Prior echo from 8/2024 revealed EF 65%, with mild to moderate concentric hypertrophy, severe left atrial dilation, mild to moderate tricuspid and mitral valve regurgitation  Currently no symptoms of ACS such as chest pain, shortness of breath, or diaphoresis   Suspect EKG changes could be due to increased demand with combination of A-fib, dehydration, and alcohol intoxication with fall

## 2025-05-07 NOTE — PHYSICAL THERAPY NOTE
PHYSICAL THERAPY CANCELLATION NOTE          Patient Name: Valentín Can  Today's Date: 5/7/2025 05/07/25 1100   Note Type   Note type Cancelled Session   Cancel Reasons Medical status   Additional Comments PT orders received, chart review performed. Spoke to FRANCE Stephens during ICU mobility rounds. At this time pt is not appropriate for PT/mobility due to medical/mental status. PT will continue to follow pt as appropriate and as schedule allows.           Brooke Chacko, PT, DPT  05/07/25

## 2025-05-07 NOTE — ASSESSMENT & PLAN NOTE
History of alcohol abuse    -[facility] protocol  -Phenobarbital as needed  -Thiamine  -Folic acid  -Vitamin B6

## 2025-05-07 NOTE — ASSESSMENT & PLAN NOTE
Results from last 7 days   Lab Units 05/07/25  0513 05/06/25  1837 05/06/25  0450 05/05/25  0422   WBC Thousand/uL 10.71* 8.61 5.91 3.47*   HEMOGLOBIN g/dL 9.8* 8.8* 8.9* 9.3*   HEMATOCRIT % 31.1* 27.7* 28.0* 29.1*   PLATELETS Thousands/uL 70* 64* 61* 73*   SEGS PCT %  --   --   --  42*   LYMPHO PCT % 11* 8*  --  39   MONO PCT % 7 8  --  14*   EOS PCT % 0 0  --  3      Appears to be chronic and most likely related to chronic alcoholism  Monitor daily CBC

## 2025-05-07 NOTE — PLAN OF CARE
Problem: Prexisting or High Potential for Compromised Skin Integrity  Goal: Skin integrity is maintained or improved  Description: INTERVENTIONS:- Identify patients at risk for skin breakdown- Assess and monitor skin integrity- Assess and monitor nutrition and hydration status- Monitor labs - Assess for incontinence - Turn and reposition patient- Assist with mobility/ambulation- Relieve pressure over bony prominences- Avoid friction and shearing- Provide appropriate hygiene as needed including keeping skin clean and dry- Evaluate need for skin moisturizer/barrier cream- Collaborate with interdisciplinary team - Patient/family teaching- Consider wound care consult   Outcome: Progressing     Problem: Nutrition/Hydration-ADULT  Goal: Nutrient/Hydration intake appropriate for improving, restoring or maintaining nutritional needs  Description: Monitor and assess patient's nutrition/hydration status for malnutrition. Collaborate with interdisciplinary team and initiate plan and interventions as ordered.  Monitor patient's weight and dietary intake as ordered or per policy. Utilize nutrition screening tool and intervene as necessary. Determine patient's food preferences and provide high-protein, high-caloric foods as appropriate. INTERVENTIONS:- Monitor oral intake, urinary output, labs, and treatment plans- Assess nutrition and hydration status and recommend course of action- Evaluate amount of meals eaten- Assist patient with eating if necessary - Allow adequate time for meals- Recommend/ encourage appropriate diets, oral nutritional supplements, and vitamin/mineral supplements- Order, calculate, and assess calorie counts as needed- Recommend, monitor, and adjust tube feedings and TPN/PPN based on assessed needs- Assess need for intravenous fluids- Provide specific nutrition/hydration education as appropriate- Include patient/family/caregiver in decisions related to nutrition  Outcome: Progressing

## 2025-05-07 NOTE — PROGRESS NOTES
Progress Note - Critical Care/ICU   Name: Valentín Can 65 y.o. male I MRN: [id number]  Unit/Bed#: ICU 15 I Date of Admission: 5/4/2025   Date of Service: 5/7/2025 I Hospital Day: 2      Assessment & Plan  Alcohol withdrawal (HCC)  64 yo M presented to the hospital after fall with headstrike on Eliquis 5/5. Hx of chronic alcohol abuse, Afib on Eliquis, HTN, CAD, COPD. Was evaluated by trauma who signed off with no acute treatment at this time. Had an episode of seizure like activity on the floor 5/6 at which point a rapid was called. Transferred to ICU for closer monitoring.    [facility] protocol  -Phenobarbital as needed  telemetry  -Thiamine  -Folic acid  -Vitamin B6  Alcohol abuse  History of alcohol abuse  Recent Labs     05/06/25 0450 05/06/25 1837 05/07/25  0513   AST 49* 65* 56*   ALT 23 26 24   ALKPHOS 112* 119* 114*   TBILI 0.80 1.19* 1.09*        -[facility] protocol  -Phenobarbital as needed  -Thiamine  -Folic acid  -Vitamin B6  Fall  Fall with headstrike on thinners. Evaluated by trauma and signed off.  Electrolyte abnormality  Recent Labs     05/06/25  0450 05/06/25 1837 05/07/25  0513   SODIUM 134* 130* 130*      Recent Labs     05/05/25  0422 05/06/25  0450 05/07/25  0513   MG 2.1 1.4* 1.5*      Recent Labs     05/07/25  0513   PHOS 2.1*    Monitor electrolytes and replete as needed. IVF as needed  Hypertension  Home medications of cardizem and metoprolol.Takes Cardizem 120 mg twice daily, and metoprolol 150 gram twice daily at home     -Cardizem 120 mg twice daily, and metoprolol 150 gram twice daily at home   CAD (coronary artery disease)  Hx of CAD on ASA    -Will resume as appropriate  A-fib (HCC)  Hx of Afib, on Eliquis and metoprolol    -cont home meds  Cont telemetry  COPD (chronic obstructive pulmonary disease) ([facility])  Hx of COPD    -Continue home albuterol and Breo Ellipta inhalers while inpatient   Pancytopenia (HCC)  Results from last 7 days   Lab Units 05/07/25 0513 05/06/25 1837  05/06/25  0450 05/05/25  0422   WBC Thousand/uL 10.71* 8.61 5.91 3.47*   HEMOGLOBIN g/dL 9.8* 8.8* 8.9* 9.3*   HEMATOCRIT % 31.1* 27.7* 28.0* 29.1*   PLATELETS Thousands/uL 70* 64* 61* 73*   SEGS PCT %  --   --   --  42*   LYMPHO PCT % 11* 8*  --  39   MONO PCT % 7 8  --  14*   EOS PCT % 0 0  --  3      Appears to be chronic and most likely related to chronic alcoholism  Monitor daily CBC  Abnormal EKG  EKG reveals rate controlled A-fib with what appear to be ST depressions in leads II, V3, V4, V5, and V6, these changes appear to be present on EKG done in December however some prior EKGs do not reveal presence of ST changes  Initial troponin 24, 2h 21  Has history with chronic angina, underwent pharmacologic stress testing in 2022 which was negative.  Has history of CAD s/p CABG in 2004 and PCI to the LAD in 2014  Prior echo from 8/2024 revealed EF 65%, with mild to moderate concentric hypertrophy, severe left atrial dilation, mild to moderate tricuspid and mitral valve regurgitation  Currently no symptoms of ACS such as chest pain, shortness of breath, or diaphoresis   Suspect EKG changes could be due to increased demand with combination of A-fib, dehydration, and alcohol intoxication with fall  KATIE (acute kidney injury) (HCC)  Recent Labs     05/06/25  0450 05/06/25  1837 05/07/25  0513   CREATININE 0.82 1.45* 1.33*   EGFR 94 50 56     CrCl cannot be calculated (Unknown ideal weight.).    POA 1.4; (baseline 0.8)  Likely prerenal    IV Fluids plasmalyte, 75cc/hr  Monitor BMP  Avoid hypoperfusion of the kidneys, minimize nephrotoxins  Disposition: Critical care    ICU Core Measures     A: Assess, Prevent, and Manage Pain Has pain been assessed? Yes  Need for changes to pain regimen? No   B: Both SAT/SAT  N/A   C: Choice of Sedation RASS Goal: 0 Alert and Calm  Need for changes to sedation or analgesia regimen? No   D: Delirium CAM-ICU: Negative   E: Early Mobility  Plan for early mobility? Yes   F: Family Engagement  Plan for family engagement today? Yes         Prophylaxis:  VTE VTE covered by:  apixaban, Oral, 5 mg at 05/06/25 1733       Stress Ulcer  covered bypantoprazole (PROTONIX) 40 mg tablet [[id number]] (Long-Term Med), pantoprazole (PROTONIX) EC tablet 40 mg [[id number]]       66 yo M presented to the hospital after fall with headstrike on Eliquis. Hx of chronic alcohol abuse, Afib on Eliquis, HTN, CAD, COPD. Was evaluated by trauma who signed off with no acute treatment at this time. Had an episode of seizure like activity on the floor at which point a rapid was called. Transferred to ICU for closer monitoring.    24 Hour Events : Patient received 520 total of phenobarb. Has been scoring 10 to 9s overnight and 8 and 6 this morning.  Subjective   Review of Systems: Review of Systems    Objective :                   Vitals I/O      Most Recent Min/Max in 24hrs   Temp 99.4 °F (37.4 °C) Temp  Min: 98.2 °F (36.8 °C)  Max: 101.3 °F (38.5 °C)   Pulse 89 Pulse  Min: 51  Max: 97   Resp 13 Resp  Min: 13  Max: 28   /82 BP  Min: 100/54  Max: 163/98   O2 Sat 99 % SpO2  Min: 86 %  Max: 100 %      Intake/Output Summary (Last 24 hours) at 5/7/2025 0902  Last data filed at 5/7/2025 0850  Gross per 24 hour   Intake 1560 ml   Output --   Net 1560 ml       Diet NPO    Invasive Monitoring           Physical Exam   Physical Exam  HENT:      Nose: No congestion or rhinorrhea.   Cardiovascular:      Rate and Rhythm: Normal rate.   Abdominal: General: There is no distension.      Tenderness: There is no abdominal tenderness. There is no guarding.   Constitutional:       Appearance: He is well-developed.   Pulmonary:      Effort: No respiratory distress.      Breath sounds: No wheezing.   Neurological:      General: No focal deficit present.      Mental Status: He is alert and oriented to person, place and time. Mental status is at baseline. He is calm.      Sensory: Sensation is intact.      Motor: gross motor function is at baseline for  patient.          Diagnostic Studies        Lab Results: I have reviewed the following results:     Medications:  Scheduled PRN   apixaban, 5 mg, BID  aspirin, 81 mg, Daily  atorvastatin, 40 mg, Daily  diltiazem, 120 mg, BID  ferrous sulfate, 325 mg, Daily With Breakfast  fluticasone-vilanterol, 1 puff, Daily  folic acid 1 mg in sodium chloride 0.9 % 50 mL IVPB, 1 mg, Daily  gabapentin, 300 mg, TID  insulin lispro, 1-6 Units, 4x Daily (AC & HS)  magnesium Oxide, 400 mg, Daily  magnesium sulfate, 2 g, Once  metoprolol succinate, 150 mg, BID  naltrexone, 50 mg, Daily  nicotine, 1 patch, Q24H  pantoprazole, 40 mg, Early Morning  potassium phosphate, 6 mmol, Once  pyridoxine, 100 mg, Daily  ranolazine, 500 mg, BID  saccharomyces boulardii, 250 mg, BID  sodium phosphate, 30 mmol, Once  tamsulosin, 0.4 mg, Daily With Dinner  thiamine, 500 mg, Daily      acetaminophen, 1,000 mg, Q6H PRN  albuterol, 2 puff, Q6H PRN       Continuous    multi-electrolyte, 100 mL/hr, Last Rate: 100 mL/hr (05/07/25 0753)         Labs:   CBC    Recent Labs     05/06/25 1837 05/07/25  0513   WBC 8.61 10.71*   HGB 8.8* 9.8*   HCT 27.7* 31.1*   PLT 64* 70*   BANDSPCT  --  4     BMP    Recent Labs     05/06/25 1837 05/07/25  0513   SODIUM 130* 130*   K 5.0 4.3   CL 95* 96   CO2 24 25   AGAP 11 9   BUN 13 15   CREATININE 1.45* 1.33*   CALCIUM 7.7* 7.9*       Coags    No recent results     Additional Electrolytes  Recent Labs     05/06/25 0450 05/07/25  0513   MG 1.4* 1.5*   PHOS  --  2.1*          Blood Gas    No recent results  No recent results LFTs  Recent Labs     05/06/25 1837 05/07/25  0513   ALT 26 24   AST 65* 56*   ALKPHOS 119* 114*   ALB 3.0* 3.2*   TBILI 1.19* 1.09*       Infectious  No recent results  Glucose  Recent Labs     05/06/25 0450 05/06/25 1837 05/07/25  0513   GLUC 104 592 94

## 2025-05-07 NOTE — ASSESSMENT & PLAN NOTE
History of alcohol abuse  Recent Labs     05/06/25  0450 05/06/25  1837 05/07/25  0513   AST 49* 65* 56*   ALT 23 26 24   ALKPHOS 112* 119* 114*   TBILI 0.80 1.19* 1.09*        -[facility] protocol  -Phenobarbital as needed  -Thiamine  -Folic acid  -Vitamin B6

## 2025-05-07 NOTE — OCCUPATIONAL THERAPY NOTE
Occupational Therapy Cancellation     Patient Name: Valentín Can  Today's Date: 5/7/2025  Problem List  Principal Problem:    Alcohol withdrawal (HCC)  Active Problems:    Fall    Abnormal EKG    Alcohol abuse    Electrolyte abnormality    Hypertension    CAD (coronary artery disease)    A-fib (HCC)    COPD (chronic obstructive pulmonary disease) (HCC)    Pancytopenia (HCC)    Diarrhea    KATIE (acute kidney injury) (HCC)    Past Medical History  History reviewed. No pertinent past medical history.  Past Surgical History  History reviewed. No pertinent surgical history.       05/07/25 1021   Note Type   Note type Cancelled Session  (Wed 5/7/25)   Cancel Reasons Medical status   Additional Comments OT orders received and chart review completed. Per RN, pt is not appropriate to participate in OT eval due to medical status (altered, not following directions / commands). ETOH abuse /use history. Will cancel and continue to follow as appropriate       Leydi Zhao, OTR/L  [id number]  [id number]

## 2025-05-07 NOTE — ASSESSMENT & PLAN NOTE
Home medications of cardizem and metoprolol.Takes Cardizem 120 mg twice daily, and metoprolol 150 gram twice daily at home     -Cardizem 120 mg twice daily, and metoprolol 150 gram twice daily at home

## 2025-05-07 NOTE — ASSESSMENT & PLAN NOTE
64 yo M presented to the hospital after fall with headstrike on Eliquis 5/5. Hx of chronic alcohol abuse, Afib on Eliquis, HTN, CAD, COPD. Was evaluated by trauma who signed off with no acute treatment at this time. Had an episode of seizure like activity on the floor 5/6 at which point a rapid was called. Transferred to ICU for closer monitoring.    [facility] protocol  -Phenobarbital as needed  telemetry  -Thiamine  -Folic acid  -Vitamin B6

## 2025-05-07 NOTE — ASSESSMENT & PLAN NOTE
Recent Labs     05/06/25  0450 05/06/25  1837 05/07/25  0513   CREATININE 0.82 1.45* 1.33*   EGFR 94 50 56     CrCl cannot be calculated (Unknown ideal weight.).    POA 1.4; (baseline 0.8)  Likely prerenal    IV Fluids plasmalyte, 75cc/hr  Monitor BMP  Avoid hypoperfusion of the kidneys, minimize nephrotoxins

## 2025-05-07 NOTE — QUICK NOTE
1427: Passed bedside speech eval, patient reportedly tolerating diet well without evidence of cough or choking.  CXR read: right mid lung opacity, may represent aspiration pneumonitis, pneumonia, or atelectasis.    Patient is currently satting 100% on 2L NC ( he reportedly uses 2L on exertion at home) Afebrile. Vitals WNL. Lung auscultation without wheezing and good airflow. Decision to monitor off abx. Will continue to monitor for clinical signs of infection.

## 2025-05-07 NOTE — PLAN OF CARE
Problem: SAFETY,RESTRAINT: NV/NON-SELF DESTRUCTIVE BEHAVIOR  Goal: Remains free of harm/injury (restraint for non violent/non self-detsructive behavior)  Description: INTERVENTIONS:- Instruct patient/family regarding restraint use - Assess and monitor physiologic and psychological status - Provide interventions and comfort measures to meet assessed patient needs - Identify and implement measures to help patient regain control- Assess readiness for release of restraint   Outcome: Progressing  Goal: Returns to optimal restraint-free functioning  Description: INTERVENTIONS:- Assess the patient's behavior and symptoms that indicate continued need for restraint- Identify and implement measures to help patient regain control- Assess readiness for release of restraint   Outcome: Progressing

## 2025-05-07 NOTE — CASE MANAGEMENT
Case Management Progress Note    Patient name Valentín Can  Location ICU 15/ICU 15 MRN [id number]  : 1960 Date 2025       LOS (days): 2  Geometric Mean LOS (GMLOS) (days): 2.3  Days to GMLOS:0.4        OBJECTIVE:        Current admission status: Inpatient  Preferred Pharmacy:   UNKNOWN - FOLLOW UP PRIOR TO DISCHARGE TO E-PRESCRIBE  No address on file      Primary Care Provider: Lesly Rivera MD    Primary Insurance: [employer] [facility] RE[facility]  Secondary Insurance:     PROGRESS NOTE:    CM spoke with Susan MURRIETA. She confirmed she met with pt on Monday and plan is for OP alcohol treatment. He has a facility close by he will attend and has the contact information. She did leave her contact information for the pt. If pt would like treatment or would like to speak with her to notify her.

## 2025-05-08 PROBLEM — R93.89 ABNORMAL CXR: Status: ACTIVE | Noted: 2025-05-08

## 2025-05-08 LAB
ALBUMIN SERPL BCG-MCNC: 2.5 G/DL (ref 3.5–5)
ALBUMIN SERPL BCG-MCNC: 2.5 G/DL (ref 3.5–5)
ALP SERPL-CCNC: 89 U/L (ref 34–104)
ALP SERPL-CCNC: 89 U/L (ref 34–104)
ALT SERPL W P-5'-P-CCNC: 25 U/L (ref 7–52)
ALT SERPL W P-5'-P-CCNC: 25 U/L (ref 7–52)
ANION GAP SERPL CALCULATED.3IONS-SCNC: 5 MMOL/L (ref 4–13)
ANION GAP SERPL CALCULATED.3IONS-SCNC: 5 MMOL/L (ref 4–13)
AST SERPL W P-5'-P-CCNC: 64 U/L (ref 13–39)
AST SERPL W P-5'-P-CCNC: 64 U/L (ref 13–39)
BILIRUB SERPL-MCNC: 0.69 MG/DL (ref 0.2–1)
BILIRUB SERPL-MCNC: 0.69 MG/DL (ref 0.2–1)
BUN SERPL-MCNC: 19 MG/DL (ref 5–25)
BUN SERPL-MCNC: 19 MG/DL (ref 5–25)
C DIFF TOX A+B STL QL IA: POSITIVE
C DIFF TOX A+B STL QL IA: POSITIVE
C DIFF TOX GENS STL QL NAA+PROBE: POSITIVE
C DIFF TOX GENS STL QL NAA+PROBE: POSITIVE
CALCIUM ALBUM COR SERPL-MCNC: 8.3 MG/DL (ref 8.3–10.1)
CALCIUM ALBUM COR SERPL-MCNC: 8.3 MG/DL (ref 8.3–10.1)
CALCIUM SERPL-MCNC: 7.1 MG/DL (ref 8.4–10.2)
CALCIUM SERPL-MCNC: 7.1 MG/DL (ref 8.4–10.2)
CHLORIDE SERPL-SCNC: 102 MMOL/L (ref 96–108)
CHLORIDE SERPL-SCNC: 102 MMOL/L (ref 96–108)
CO2 SERPL-SCNC: 26 MMOL/L (ref 21–32)
CO2 SERPL-SCNC: 26 MMOL/L (ref 21–32)
CREAT SERPL-MCNC: 1.04 MG/DL (ref 0.6–1.3)
CREAT SERPL-MCNC: 1.04 MG/DL (ref 0.6–1.3)
ERYTHROCYTE [DISTWIDTH] IN BLOOD BY AUTOMATED COUNT: 19.9 % (ref 11.6–15.1)
ERYTHROCYTE [DISTWIDTH] IN BLOOD BY AUTOMATED COUNT: 19.9 % (ref 11.6–15.1)
GFR SERPL CREATININE-BSD FRML MDRD: 76 ML/MIN/1.73SQ M
GFR SERPL CREATININE-BSD FRML MDRD: 76 ML/MIN/1.73SQ M
GLUCOSE SERPL-MCNC: 101 MG/DL (ref 65–140)
GLUCOSE SERPL-MCNC: 101 MG/DL (ref 65–140)
GLUCOSE SERPL-MCNC: 108 MG/DL (ref 65–140)
GLUCOSE SERPL-MCNC: 108 MG/DL (ref 65–140)
GLUCOSE SERPL-MCNC: 130 MG/DL (ref 65–140)
GLUCOSE SERPL-MCNC: 130 MG/DL (ref 65–140)
GLUCOSE SERPL-MCNC: 145 MG/DL (ref 65–140)
GLUCOSE SERPL-MCNC: 145 MG/DL (ref 65–140)
GLUCOSE SERPL-MCNC: 166 MG/DL (ref 65–140)
GLUCOSE SERPL-MCNC: 166 MG/DL (ref 65–140)
HCT VFR BLD AUTO: 28.3 % (ref 36.5–49.3)
HCT VFR BLD AUTO: 28.3 % (ref 36.5–49.3)
HGB BLD-MCNC: 8.9 G/DL (ref 12–17)
HGB BLD-MCNC: 8.9 G/DL (ref 12–17)
MAGNESIUM SERPL-MCNC: 1.6 MG/DL (ref 1.9–2.7)
MAGNESIUM SERPL-MCNC: 1.6 MG/DL (ref 1.9–2.7)
MCH RBC QN AUTO: 32 PG (ref 26.8–34.3)
MCH RBC QN AUTO: 32 PG (ref 26.8–34.3)
MCHC RBC AUTO-ENTMCNC: 31.4 G/DL (ref 31.4–37.4)
MCHC RBC AUTO-ENTMCNC: 31.4 G/DL (ref 31.4–37.4)
MCV RBC AUTO: 102 FL (ref 82–98)
MCV RBC AUTO: 102 FL (ref 82–98)
PLATELET # BLD AUTO: 69 THOUSANDS/UL (ref 149–390)
PLATELET # BLD AUTO: 69 THOUSANDS/UL (ref 149–390)
PMV BLD AUTO: 12.2 FL (ref 8.9–12.7)
PMV BLD AUTO: 12.2 FL (ref 8.9–12.7)
POTASSIUM SERPL-SCNC: 4.7 MMOL/L (ref 3.5–5.3)
POTASSIUM SERPL-SCNC: 4.7 MMOL/L (ref 3.5–5.3)
PROT SERPL-MCNC: 5.4 G/DL (ref 6.4–8.4)
PROT SERPL-MCNC: 5.4 G/DL (ref 6.4–8.4)
RBC # BLD AUTO: 2.78 MILLION/UL (ref 3.88–5.62)
RBC # BLD AUTO: 2.78 MILLION/UL (ref 3.88–5.62)
SODIUM SERPL-SCNC: 133 MMOL/L (ref 135–147)
SODIUM SERPL-SCNC: 133 MMOL/L (ref 135–147)
WBC # BLD AUTO: 5.92 THOUSAND/UL (ref 4.31–10.16)
WBC # BLD AUTO: 5.92 THOUSAND/UL (ref 4.31–10.16)

## 2025-05-08 PROCEDURE — 82948 REAGENT STRIP/BLOOD GLUCOSE: CPT

## 2025-05-08 PROCEDURE — 83735 ASSAY OF MAGNESIUM: CPT

## 2025-05-08 PROCEDURE — 97167 OT EVAL HIGH COMPLEX 60 MIN: CPT

## 2025-05-08 PROCEDURE — 99232 SBSQ HOSP IP/OBS MODERATE 35: CPT | Performed by: INTERNAL MEDICINE

## 2025-05-08 PROCEDURE — 97163 PT EVAL HIGH COMPLEX 45 MIN: CPT

## 2025-05-08 PROCEDURE — 80053 COMPREHEN METABOLIC PANEL: CPT

## 2025-05-08 PROCEDURE — 85027 COMPLETE CBC AUTOMATED: CPT

## 2025-05-08 RX ORDER — LANOLIN ALCOHOL/MO/W.PET/CERES
100 CREAM (GRAM) TOPICAL DAILY
Status: DISCONTINUED | OUTPATIENT
Start: 2025-05-09 | End: 2025-05-09 | Stop reason: HOSPADM

## 2025-05-08 RX ORDER — ACETAMINOPHEN 325 MG/1
650 TABLET ORAL EVERY 6 HOURS PRN
Status: DISCONTINUED | OUTPATIENT
Start: 2025-05-08 | End: 2025-05-09 | Stop reason: HOSPADM

## 2025-05-08 RX ORDER — FOLIC ACID 1 MG/1
1 TABLET ORAL DAILY
Status: DISCONTINUED | OUTPATIENT
Start: 2025-05-09 | End: 2025-05-09 | Stop reason: HOSPADM

## 2025-05-08 RX ORDER — MAGNESIUM SULFATE HEPTAHYDRATE 40 MG/ML
4 INJECTION, SOLUTION INTRAVENOUS ONCE
Status: COMPLETED | OUTPATIENT
Start: 2025-05-08 | End: 2025-05-08

## 2025-05-08 RX ORDER — VANCOMYCIN HYDROCHLORIDE 125 MG/1
125 CAPSULE ORAL EVERY 6 HOURS SCHEDULED
Status: DISCONTINUED | OUTPATIENT
Start: 2025-05-08 | End: 2025-05-09

## 2025-05-08 RX ORDER — INSULIN LISPRO 100 [IU]/ML
1-6 INJECTION, SOLUTION INTRAVENOUS; SUBCUTANEOUS
Status: DISCONTINUED | OUTPATIENT
Start: 2025-05-08 | End: 2025-05-09 | Stop reason: HOSPADM

## 2025-05-08 RX ORDER — MAGNESIUM SULFATE HEPTAHYDRATE 40 MG/ML
2 INJECTION, SOLUTION INTRAVENOUS ONCE
Status: DISCONTINUED | OUTPATIENT
Start: 2025-05-08 | End: 2025-05-08

## 2025-05-08 RX ADMIN — ATORVASTATIN CALCIUM 40 MG: 40 TABLET, FILM COATED ORAL at 08:39

## 2025-05-08 RX ADMIN — FOLIC ACID 1 MG: 5 INJECTION, SOLUTION INTRAMUSCULAR; INTRAVENOUS; SUBCUTANEOUS at 09:46

## 2025-05-08 RX ADMIN — ALBUTEROL SULFATE 2 PUFF: 90 AEROSOL, METERED RESPIRATORY (INHALATION) at 08:39

## 2025-05-08 RX ADMIN — VANCOMYCIN HYDROCHLORIDE 125 MG: 125 CAPSULE ORAL at 17:19

## 2025-05-08 RX ADMIN — VANCOMYCIN HYDROCHLORIDE 125 MG: 125 CAPSULE ORAL at 23:44

## 2025-05-08 RX ADMIN — APIXABAN 5 MG: 5 TABLET, FILM COATED ORAL at 08:39

## 2025-05-08 RX ADMIN — PANTOPRAZOLE SODIUM 40 MG: 40 TABLET, DELAYED RELEASE ORAL at 05:34

## 2025-05-08 RX ADMIN — ASPIRIN 81 MG CHEWABLE TABLET 81 MG: 81 TABLET CHEWABLE at 08:39

## 2025-05-08 RX ADMIN — INSULIN LISPRO 1 UNITS: 100 INJECTION, SOLUTION INTRAVENOUS; SUBCUTANEOUS at 17:20

## 2025-05-08 RX ADMIN — RANOLAZINE 500 MG: 500 TABLET, FILM COATED, EXTENDED RELEASE ORAL at 08:39

## 2025-05-08 RX ADMIN — METOPROLOL SUCCINATE 75 MG: 50 TABLET, EXTENDED RELEASE ORAL at 22:08

## 2025-05-08 RX ADMIN — THIAMINE HYDROCHLORIDE 500 MG: 100 INJECTION, SOLUTION INTRAMUSCULAR; INTRAVENOUS at 08:37

## 2025-05-08 RX ADMIN — SODIUM CHLORIDE 100 ML/HR: 0.9 INJECTION, SOLUTION INTRAVENOUS at 09:47

## 2025-05-08 RX ADMIN — GABAPENTIN 300 MG: 300 CAPSULE ORAL at 22:08

## 2025-05-08 RX ADMIN — Medication 400 MG: at 08:39

## 2025-05-08 RX ADMIN — RANOLAZINE 500 MG: 500 TABLET, FILM COATED, EXTENDED RELEASE ORAL at 17:20

## 2025-05-08 RX ADMIN — GABAPENTIN 300 MG: 300 CAPSULE ORAL at 17:19

## 2025-05-08 RX ADMIN — APIXABAN 5 MG: 5 TABLET, FILM COATED ORAL at 17:20

## 2025-05-08 RX ADMIN — DILTIAZEM HYDROCHLORIDE 120 MG: 60 CAPSULE, EXTENDED RELEASE ORAL at 22:08

## 2025-05-08 RX ADMIN — FERROUS SULFATE TAB 325 MG (65 MG ELEMENTAL FE) 325 MG: 325 (65 FE) TAB at 07:44

## 2025-05-08 RX ADMIN — MAGNESIUM SULFATE HEPTAHYDRATE 4 G: 40 INJECTION, SOLUTION INTRAVENOUS at 07:43

## 2025-05-08 RX ADMIN — TAMSULOSIN HYDROCHLORIDE 0.4 MG: 0.4 CAPSULE ORAL at 17:20

## 2025-05-08 RX ADMIN — NALTREXONE HYDROCHLORIDE 50 MG: 50 TABLET, FILM COATED ORAL at 08:39

## 2025-05-08 RX ADMIN — PYRIDOXINE HCL TAB 50 MG 100 MG: 50 TAB at 08:39

## 2025-05-08 RX ADMIN — NICOTINE 1 PATCH: 21 PATCH, EXTENDED RELEASE TRANSDERMAL at 08:39

## 2025-05-08 RX ADMIN — Medication 250 MG: at 17:19

## 2025-05-08 RX ADMIN — GABAPENTIN 300 MG: 300 CAPSULE ORAL at 08:39

## 2025-05-08 RX ADMIN — Medication 250 MG: at 08:39

## 2025-05-08 NOTE — PROGRESS NOTES
Progress Note - Critical Care/ICU   Name: Valentín Can 65 y.o. male I MRN: [id number]  Unit/Bed#: ICU 15 I Date of Admission: 5/4/2025   Date of Service: 5/8/2025 I Hospital Day: 3      Assessment & Plan  Alcohol withdrawal (HCC)  64 yo M presented to the hospital after fall with headstrike on Eliquis 5/5. Hx of chronic alcohol abuse, Afib on Eliquis, HTN, CAD, COPD. Was evaluated by trauma who signed off with no acute treatment at this time. Had an episode of seizure like activity on the floor 5/6 at which point a rapid was called. Transferred to ICU for closer monitoring.    [facility] protocol  -Phenobarbital as needed  telemetry  -Thiamine  -Folic acid  -Vitamin B6  Alcohol abuse  History of alcohol abuse  Recent Labs     05/06/25  1837 05/07/25  0513 05/08/25  0523   AST 65* 56* 64*   ALT 26 24 25   ALKPHOS 119* 114* 89   TBILI 1.19* 1.09* 0.69        -[facility] protocol  -Phenobarbital as needed  -Thiamine  -Folic acid  -Vitamin B6  Fall  Fall with headstrike on thinners. Evaluated by trauma and signed off.  Electrolyte abnormality  Recent Labs     05/07/25  0513 05/07/25  1356 05/08/25  0523   SODIUM 130* 134* 133*      Recent Labs     05/06/25  0450 05/07/25  0513 05/08/25  0523   MG 1.4* 1.5* 1.6*      Recent Labs     05/07/25  0513   PHOS 2.1*       Lab Results   Component Value Date    OSMOUA 218 (L) 05/07/2025    NAUR 36.0 05/07/2025    OSMOLALITSER 284 05/07/2025     Likely hypovolemic hyponatremia     Monitor electrolytes and replete as needed. IVF as needed  Normal Saline infusion 100 mL/hr  Goal sodium increase 6-8 in 24 hours  Avoid over-correction to prevent osmotic demyelination syndrome  Encourage adequate oral intake  Monitor BMP QAM; Adjust fluid rate/concentration accordingly   Hypertension  Home medications of cardizem and metoprolol.Takes Cardizem 120 mg twice daily, and metoprolol 150 gram twice daily at home     -Cardizem 120 mg twice daily, and metoprolol 150 gram twice daily at home   CAD  "(coronary artery disease)  Hx of CAD on ASA    -Will resume as appropriate  A-fib ([facility])  Hx of Afib, on Eliquis and metoprolol    -cont home meds  Cont telemetry  COPD (chronic obstructive pulmonary disease) ([facility])  Hx of COPD. On 2L at home \"as needed\".    -Continue home albuterol and Breo Ellipta inhalers while inpatient   Pancytopenia ([facility])  Results from last 7 days   Lab Units 05/08/25  0618 05/07/25  0513 05/06/25  1837 05/06/25  0450 05/05/25  0422   WBC Thousand/uL 5.92 10.71* 8.61 5.91 3.47*   HEMOGLOBIN g/dL 8.9* 9.8* 8.8* 8.9* 9.3*   HEMATOCRIT % 28.3* 31.1* 27.7* 28.0* 29.1*   PLATELETS Thousands/uL 69* 70* 64* 61* 73*   SEGS PCT %  --   --   --   --  42*   LYMPHO PCT %  --  11* 8*  --  39   MONO PCT %  --  7 8  --  14*   EOS PCT %  --  0 0  --  3      Appears to be chronic and most likely related to chronic alcoholism  Monitor daily CBC  Abnormal EKG  EKG reveals rate controlled A-fib with what appear to be ST depressions in leads II, V3, V4, V5, and V6, these changes appear to be present on EKG done in December however some prior EKGs do not reveal presence of ST changes  Initial troponin 24, 2h 21  Has history with chronic angina, underwent pharmacologic stress testing in 2022 which was negative.  Has history of CAD s/p CABG in 2004 and PCI to the LAD in 2014  Prior echo from 8/2024 revealed EF 65%, with mild to moderate concentric hypertrophy, severe left atrial dilation, mild to moderate tricuspid and mitral valve regurgitation  Currently no symptoms of ACS such as chest pain, shortness of breath, or diaphoresis   Suspect EKG changes could be due to increased demand with combination of A-fib, dehydration, and alcohol intoxication with fall  KATIE (acute kidney injury) ([facility])  Recent Labs     05/07/25  0513 05/07/25  1356 05/08/25  0523   CREATININE 1.33* 1.15 1.04   EGFR 56 67 76     Estimated Creatinine Clearance: 84.5 mL/min (by C-G formula based on SCr of 1.04 mg/dL).    POA 1.4; (baseline " 0.8)  Likely prerenal    IV Fluids  Monitor BMP  Avoid hypoperfusion of the kidneys, minimize nephrotoxins  Abnormal CXR  CXR read: right mid lung opacity, may represent aspiration pneumonitis, pneumonia, or atelectasis.    Patient is currently satting 100% on 2L NC ( he reportedly uses 2L on exertion at home) Afebrile. Vitals WNL. Lung auscultation without wheezing and good airflow.     Decision to monitor off abx.   Will continue to monitor for clinical signs of infection.  Disposition: Critical care    ICU Core Measures     A: Assess, Prevent, and Manage Pain Has pain been assessed? Yes  Need for changes to pain regimen? No   B: Both SAT/SAT  N/A   C: Choice of Sedation RASS Goal: 0 Alert and Calm  Need for changes to sedation or analgesia regimen? No   D: Delirium CAM-ICU: Negative   E: Early Mobility  Plan for early mobility? Yes   F: Family Engagement Plan for family engagement today? Yes         Prophylaxis:  VTE VTE covered by:  apixaban, Oral, 5 mg at 05/07/25 1704       Stress Ulcer  covered bypantoprazole (PROTONIX) 40 mg tablet [[id number]] (Long-Term Med), pantoprazole (PROTONIX) EC tablet 40 mg [[id number]]       64 yo M presented to the hospital after fall with headstrike on Eliquis. Hx of chronic alcohol abuse, Afib on Eliquis, HTN, CAD, COPD. Was evaluated by trauma who signed off with no acute treatment at this time. Had an episode of tonic clonic seizure on the floor at which point a rapid was called. Transferred to ICU for closer monitoring.    24 Hour Events : CIWA Has been scoring 6s overnight, 2.5 mg valium  overnight at the beginning of the night because patient was requesting. And CIWA 4 this morning.   Subjective   Review of Systems: Review of Systems   Constitutional:  Negative for activity change, appetite change, fever and unexpected weight change.   Respiratory:  Negative for apnea.    Genitourinary:  Negative for dysuria and enuresis.   Neurological:  Negative for dizziness, seizures and  numbness.       Objective :                   Vitals I/O      Most Recent Min/Max in 24hrs   Temp 97.7 °F (36.5 °C) Temp  Min: 97.4 °F (36.3 °C)  Max: 97.9 °F (36.6 °C)   Pulse (!) 51 Pulse  Min: 51  Max: 113   Resp 13 Resp  Min: 13  Max: 40   BP (!) 83/56 BP  Min: 83/56  Max: 158/77   O2 Sat 100 % SpO2  Min: 99 %  Max: 100 %      Intake/Output Summary (Last 24 hours) at 5/8/2025 0835  Last data filed at 5/8/2025 0625  Gross per 24 hour   Intake 3102.3 ml   Output 650 ml   Net 2452.3 ml       Diet Regular; Regular House    Invasive Monitoring           Physical Exam   Physical Exam  HENT:      Nose: No congestion or rhinorrhea.   Cardiovascular:      Rate and Rhythm: Normal rate.   Abdominal: General: There is no distension.      Tenderness: There is no abdominal tenderness. There is no guarding.   Constitutional:       Appearance: He is well-developed.   Pulmonary:      Effort: No respiratory distress.      Breath sounds: No wheezing.   Neurological:      General: No focal deficit present.      Mental Status: He is alert and oriented to person, place and time. Mental status is at baseline. He is calm.      Sensory: Sensation is intact.      Motor: gross motor function is at baseline for patient.          Diagnostic Studies        Lab Results: I have reviewed the following results:     Medications:  Scheduled PRN   apixaban, 5 mg, BID  aspirin, 81 mg, Daily  atorvastatin, 40 mg, Daily  diltiazem, 120 mg, BID  ferrous sulfate, 325 mg, Daily With Breakfast  fluticasone-vilanterol, 1 puff, Daily  folic acid 1 mg in sodium chloride 0.9 % 50 mL IVPB, 1 mg, Daily  gabapentin, 300 mg, TID  insulin lispro, 1-6 Units, 4x Daily (AC & HS)  magnesium Oxide, 400 mg, Daily  magnesium sulfate, 4 g, Once  metoprolol succinate, 75 mg, BID  naltrexone, 50 mg, Daily  nicotine, 1 patch, Q24H  pantoprazole, 40 mg, Early Morning  pyridoxine, 100 mg, Daily  ranolazine, 500 mg, BID  saccharomyces boulardii, 250 mg, BID  tamsulosin, 0.4 mg,  Daily With Dinner  thiamine, 500 mg, Daily      acetaminophen, 1,000 mg, Q6H PRN  albuterol, 2 puff, Q6H PRN       Continuous    sodium chloride, 100 mL/hr, Last Rate: 100 mL/hr (05/07/25 1228)         Labs:   CBC    Recent Labs     05/07/25  0513 05/08/25  0618   WBC 10.71* 5.92   HGB 9.8* 8.9*   HCT 31.1* 28.3*   PLT 70* 69*   BANDSPCT 4  --      BMP    Recent Labs     05/07/25  1356 05/08/25  0523   SODIUM 134* 133*   K 3.8 4.7   CL 98 102   CO2 28 26   AGAP 8 5   BUN 15 19   CREATININE 1.15 1.04   CALCIUM 7.5* 7.1*       Coags    No recent results     Additional Electrolytes  Recent Labs     05/07/25  0513 05/08/25  0523   MG 1.5* 1.6*   PHOS 2.1*  --           Blood Gas    No recent results  No recent results LFTs  Recent Labs     05/07/25  0513 05/08/25  0523   ALT 24 25   AST 56* 64*   ALKPHOS 114* 89   ALB 3.2* 2.5*   TBILI 1.09* 0.69       Infectious  No recent results  Glucose  Recent Labs     05/06/25  1837 05/07/25  0513 05/07/25  1356 05/08/25  0523   GLUC 127 94 100 101

## 2025-05-08 NOTE — PLAN OF CARE
Problem: PHYSICAL THERAPY ADULT  Goal: Performs mobility at highest level of function for planned discharge setting.  See evaluation for individualized goals.  Description: Treatment/Interventions: Functional transfer training, LE strengthening/ROM, Elevations, Therapeutic exercise, Cognitive reorientation, Patient/family training, Equipment eval/education, Bed mobility, Gait training, Spoke to nursing, Spoke to case management, OT          See flowsheet documentation for full assessment, interventions and recommendations.  Note: Prognosis: Fair  Problem List: Decreased strength, Decreased endurance, Impaired balance, Decreased mobility, Decreased cognition, Impaired judgement, Decreased safety awareness  Assessment: Pt is a 65 y.o. male seen for PT evaluation s/p admit to [facility] on 5/4/2025. Pt was admitted with a primary dx of: alcohol withdrawal, head pain.  PT now consulted for assessment of mobility and d/c needs. Pt with OOB to chair orders.  Pts current comorbidities and personal factors effecting treatment include:  alcohol abuse, HTN, CAD, afib, COPD. Pts current clinical presentation is Unstable/Unpredictable (high complexity) due to Ongoing medical management for primary dx, Decreased activity tolerance compared to baseline, Fall risk, Increased assistance needed from caregiver at current time, Ongoing telemetry monitoring, Cog status. Prior to admission, pt was independent with use of Rollator. Upon evaluation, pt currently is requiring; Supervision for transfers and Supervision for ambulation 15 ft w/ RW. Pt presents at PT eval functioning below baseline and currently w/ overall mobility deficits 2* to: BLE weakness, impaired balance, gait deviations, decreased activity tolerance compared to baseline, decreased functional mobility tolerance compared to baseline, decreased safety awareness, impaired judgement, fall risk. Pt currently at a fall risk 2* to impairments listed above.  Pt will  continue to benefit from skilled acute PT interventions to address stated impairments; to maximize functional mobility; for ongoing pt/ family training; and DME needs. At conclusion of PT session chair alarm engaged, all needs in reach, RN notified of session findings/recommendations, and pt returned back in recliner chair with phone and call bell within reach. Pt denies any further questions at this time. Recommend Level III (Minimum Resource Intensity)  upon hospital D/C.        Rehab Resource Intensity Level, PT: III (Minimum Resource Intensity)    See flowsheet documentation for full assessment.

## 2025-05-08 NOTE — ASSESSMENT & PLAN NOTE
History of alcohol abuse  Recent Labs     05/06/25  1837 05/07/25  0513 05/08/25  0523   AST 65* 56* 64*   ALT 26 24 25   ALKPHOS 119* 114* 89   TBILI 1.19* 1.09* 0.69        -[facility] protocol  -Phenobarbital as needed  -Thiamine  -Folic acid  -Vitamin B6

## 2025-05-08 NOTE — QUICK NOTE
.critiCritical Care Interval Transfer Note:    Brief Hospital Summary: 64 yo M presented to the hospital on 5/5 after fall with headstrike on Eliquis. Hx of chronic alcohol abuse(1 pint of liquor a day, last drink was day of presentation), Afib on Eliquis, HTN, CAD, COPD on 2L. Was evaluated by trauma who signed off. Had an episode of tonic clonic seizure on the floor on 5/7 at which point a rapid was called. Transferred to ICU for closer monitoring and phenobarbitol administration. His CIWA scores have been 4-5 since the morning and he hasn't required phenobarb for >24 hours at this point, no recurrence of seizures.   CXR showed concern for right mid lung opacity, may represent aspiration pneumonitis, pneumonia, or atelectasis.    Patient is currently satting 100% on 2L NC ( he reportedly uses 2L on exertion at home) Afebrile. Vitals and white count WNL. Lung auscultation without wheezing and good airflow. Likely aspiration pneumonitis.  Decision to monitor off abx. Recommend to monitor for clinical signs of infection.  -aspiration precautions  -incentive spirometry    Barriers to discharge:   C. Diff positive, currently on PO vancomycin  PT/OT evaluations     Consults: None    Recommended to review admission imaging for incidental findings and document in discharge navigator: Incidental findings have been documented in discharge navigator. Patient and/or family was informed and they expressed understanding.      Discharge Plan: Anticipate discharge tomorrow to discharge location to be determined pending rehab evaluations.       Patient seen and evaluated by Critical Care today and deemed to be appropriate for transfer to Med Surg. Spoke to Dr. Nichelle Matthews from [facility] to accept transfer. Critical care can be contacted via SecureChat with any questions or concerns. Please use the Critical Care AP Role in Secure Chat for any provider inquires until the patient is transferred out of the ICU or until tomorrow at  0600.

## 2025-05-08 NOTE — PLAN OF CARE
Problem: OCCUPATIONAL THERAPY ADULT  Goal: Performs self-care activities at highest level of function for planned discharge setting.  See evaluation for individualized goals.  Description: Treatment Interventions: ADL retraining, Functional transfer training, Endurance training, UE strengthening/ROM, Cognitive reorientation, Patient/family training, Equipment evaluation/education, Neuromuscular reeducation, Compensatory technique education, Activityengagement          See flowsheet documentation for full assessment, interventions and recommendations.   Note: Limitation: Decreased ADL status, Decreased Safe judgement during ADL, Decreased UE strength, Decreased cognition, Decreased endurance, Decreased self-care trans, Decreased high-level ADLs (coordination, safety awareness)  Prognosis: Fair  Assessment: Patient is a 65 y.o. male seen for OT evaluation at [facility] following admission on 5/4/2025  s/p Alcohol withdrawal (HCC). Please see above for comprehensive list of comorbidities and significant PMHx impacting functional performance.  Upon initial evaluation, pt appears to be performing below baseline functional status.   Occupational performance is affected by the following deficits: endurance ,  decreased muscular strength , decreased balance , decreased standing tolerance for self care tasks , decreased trunk control , decreased activity tolerance , impaired memory , attention to task, impaired judgement and problem solving , and impaired safety awareness . Personal/Environmental factors impacting D/C include: (+) Hx of falls  and health management. Supporting factors include: accessible home environment Patient would benefit from OT services within the acute care setting to maximize level of functional independence in the following areas self-care transfers, functional mobility, and ADLs.  From OT standpoint, recommendation at time of D/C would be Level 2: moderate resource intensity .      Rehab Resource Intensity Level, OT: III (Minimum Resource Intensity)     Lolly Villarreal, OT

## 2025-05-08 NOTE — ASSESSMENT & PLAN NOTE
Results from last 7 days   Lab Units 05/08/25  0618 05/07/25  0513 05/06/25  1837 05/06/25  0450 05/05/25  0422   WBC Thousand/uL 5.92 10.71* 8.61 5.91 3.47*   HEMOGLOBIN g/dL 8.9* 9.8* 8.8* 8.9* 9.3*   HEMATOCRIT % 28.3* 31.1* 27.7* 28.0* 29.1*   PLATELETS Thousands/uL 69* 70* 64* 61* 73*   SEGS PCT %  --   --   --   --  42*   LYMPHO PCT %  --  11* 8*  --  39   MONO PCT %  --  7 8  --  14*   EOS PCT %  --  0 0  --  3      Appears to be chronic and most likely related to chronic alcoholism  Monitor daily CBC

## 2025-05-08 NOTE — OCCUPATIONAL THERAPY NOTE
Occupational Therapy Evaluation     Patient Name: Valentín Can  Today's Date: 5/8/2025  Problem List  Principal Problem:    Alcohol withdrawal (HCC)  Active Problems:    Fall    Abnormal EKG    Alcohol abuse    Electrolyte abnormality    Hypertension    CAD (coronary artery disease)    A-fib (HCC)    COPD (chronic obstructive pulmonary disease) (HCC)    Pancytopenia (HCC)    Diarrhea    KATIE (acute kidney injury) (HCC)    Abnormal CXR    Past Medical History  History reviewed. No pertinent past medical history.  Past Surgical History  History reviewed. No pertinent surgical history.          05/08/25 1126   OT Last Visit   OT Visit Date 05/08/25   Note Type   Note type Evaluation   Pain Assessment   Pain Assessment Tool 0-10   Pain Score No Pain   Restrictions/Precautions   Weight Bearing Precautions Per Order No   Braces or Orthoses   (denies braces/special footwear)   Other Precautions Seizure;Chair Alarm;Bed Alarm;Multiple lines;Telemetry;Fall Risk;Contact/isolation  (CIWA, r/o c.diff)   Home Living   Type of Home Apartment  (5th floor apt)   Home Layout One level;Access;Elevator   Bathroom Shower/Tub Tub/shower unit   Bathroom Toilet Standard   Bathroom Equipment Grab bars in shower;Grab bars around toilet   Bathroom Accessibility Accessible   Home Equipment Cane  (rollator)   Prior Function   Level of [place] Independent with ADLs;Independent with functional mobility   Lives With Alone   Receives Help From Family  (son helps pt)   IADLs Family/Friend/Other provides medication management  (uses bus for transportation, son delivers groceries vs takes bus)   Falls in the last 6 months (S)  >10  (1 fall leading to admission , reports approx 20 falls in last 6 mo)   Vocational Retired  (construction)   Comments reports son was just visting last weekend helping pt clean his apt   Lifestyle   Autonomy PTA pt is (I) c ADLs, A with IADLs. mod (I) c rollator. Lives alone. + falls. Uses bus for transportation  "  Reciprocal Relationships son   Service to Others retired from construction   Intrinsic Gratification watching TV   General   Additional Pertinent History Pt admitted d/t alcohol withdrawal, complicated by seizure, KATIE, pancytopenia, CAD, HTN, electrolyte abnormality.   Family/Caregiver Present No   Subjective   Subjective \" I usually fall when Im drinking\" \"I forget to use my rollator when Im drinking\"   ADL   Where Assessed Chair   Eating Assistance 6  Modified independent   Eating Deficit   (eating meal upon arrival)   Grooming Assistance 6  Modified Independent   UB Bathing Assistance 5  Supervision/Setup   LB Bathing Assistance 5  Supervision/Setup   UB Dressing Assistance 5  Supervision/Setup   LB Dressing Assistance 4  Minimal Assistance   LB Dressing Deficit Supervision/safety;Increased time to complete;Setup;Requires assistive device for steadying  (threads BLE into underwear with supervision, pulls over hips c CGA steadying- cueing for Use of UE x 1 on RW.)   Toileting Assistance  5  Supervision/Setup   Functional Assistance 5  Supervision/Setup   Bed Mobility   Supine to Sit 5  Supervision   Additional items Increased time required   Additional Comments seated OOB in recliner at end of session.   Transfers   Sit to Stand 5  Supervision   Additional items Increased time required;Verbal cues;Assist x 1   Stand to Sit 5  Supervision   Additional items Increased time required;Verbal cues;Assist x 1   Additional Comments RW used during transfers   Functional Mobility   Functional Mobility 5  Supervision   Additional Comments close supervision household distances, intermittent cueing for RW management. R foot drop noted.   Additional items Rolling walker   Balance   Static Sitting Fair +   Dynamic Sitting Fair   Static Standing Fair -   Dynamic Standing Fair -   Activity Tolerance   Activity Tolerance Patient limited by fatigue   Medical Staff Made Aware Care coordination c PT Khoa. FELY Hercules.   Nurse Made " Aware RN Pre/post   RUE Assessment   RUE Assessment WFL  (MMT grossly 4/5 based on functional assessment)   LUE Assessment   LUE Assessment WFL  (MMT grossly 4/5 based on functional assessment)   Hand Function   Gross Motor Coordination Impaired   Fine Motor Coordination Impaired   Sensation   Light Touch No apparent deficits   Cognition   Overall Cognitive Status WFL  (higher level deficits anticipated)   Arousal/Participation Cooperative   Attention Attends with cues to redirect   Orientation Level Oriented to person;Oriented to place;Oriented to time;Oriented to situation  (grossly month . year)   Memory Decreased recall of precautions   Following Commands Follows one step commands with increased time or repetition   Comments limited safety awareness, increased processing time.   Assessment   Limitation Decreased ADL status;Decreased Safe judgement during ADL;Decreased UE strength;Decreased cognition;Decreased endurance;Decreased self-care trans;Decreased high-level ADLs  (coordination, safety awareness)   Prognosis Fair   Assessment Patient is a 65 y.o. male seen for OT evaluation at [facility] following admission on 5/4/2025  s/p Alcohol withdrawal (HCC). Please see above for comprehensive list of comorbidities and significant PMHx impacting functional performance.  Upon initial evaluation, pt appears to be performing below baseline functional status.   Occupational performance is affected by the following deficits: endurance ,  decreased muscular strength , decreased balance , decreased standing tolerance for self care tasks , decreased trunk control , decreased activity tolerance , impaired memory , attention to task, impaired judgement and problem solving , and impaired safety awareness . Personal/Environmental factors impacting D/C include: (+) Hx of falls  and health management. Supporting factors include: accessible home environment Patient would benefit from OT services within the acute  care setting to maximize level of functional independence in the following areas self-care transfers, functional mobility, and ADLs.  From OT standpoint, recommendation at time of D/C would be Level 2: moderate resource intensity .   Goals   Patient Goals to reduce falls, to remember to use his walker   Plan   Treatment Interventions ADL retraining;Functional transfer training;Endurance training;UE strengthening/ROM;Cognitive reorientation;Patient/family training;Equipment evaluation/education;Neuromuscular reeducation;Compensatory technique education;Activityengagement   Goal Expiration Date 05/18/25   OT Treatment Day 0   OT Frequency 1-2x/wk   Discharge Recommendation   Rehab Resource Intensity Level, OT III (Minimum Resource Intensity)   Additional Comments  The patient's raw score on the AM-PAC Daily Activity Inpatient Short Form is 19. A raw score of greater than or equal to 19 suggests the patient may benefit from discharge to home. Please refer to the recommendation of the Occupational Therapist for safe discharge planning.   AM-PAC Daily Activity Inpatient   Lower Body Dressing 3   Bathing 3   Toileting 3   Upper Body Dressing 3   Grooming 3   Eating 4   Daily Activity Raw Score 19   Daily Activity Standardized Score (Calc for Raw Score >=11) 40.22   AM-PAC Applied Cognition Inpatient   Following a Speech/Presentation 3   Understanding Ordinary Conversation 4   Taking Medications 2   Remembering Where Things Are Placed or Put Away 3   Remembering List of 4-5 Errands 3   Taking Care of Complicated Tasks 2   Applied Cognition Raw Score 17   Applied Cognition Standardized Score 36.52   End of Consult   Education Provided Yes   Patient Position at End of Consult Bed/Chair alarm activated;All needs within reach;Bedside chair   Nurse Communication Nurse aware of consult   Goals established on initial evaluation in order to achieve pt's goal of reducing falls and remembering to use his walker.     Pt will complete  UB ADLs Mod independent   for increased ADL independence within 10 days.     Pt will complete LB ADLs Mod independent   for increased ADL independence within 10 days.     Pt will complete toileting Mod independent   with use of DME for increased ADL independence within 10 days.     Pt will demonstrate proper body mechanics to complete self-care transfers and functional mobility with Mod independent  and use of LRAD for increased safety and functional independence within 10 days.     Pt will demonstrate standing tolerance of 10 min for increased activity tolerance during ADL/IADL tasks within 10 days.     Pt will complete bed mobility Mod independent  for increased independence in repositioning, pressure offloading, and managing comfort.     Pt will demonstrate proper body mechanics and fall prevention strategies during 100% of tx sessions for increased safety awareness during ADL/IADLs    Pt will demonstrate activity tolerance of 10 min in therapeutic tasks for increased participation in meaningful activities upon D/C.    Pt will demonstrate OOB sitting tolerance of 2-4 hr/day for increased activity tolerance and engagement in leisure activities within 10 days.     Pt will dispense simulated medications into weekly pill box with 100% dosing accuracy and 0 VC's for improved safety and independence during medication management upon D/C       Pt benefited from co-session of skilled OT and PT therapists in order to most appropriately address functional deficits d/t decreased activity tolerance.  OT/PT objectives were addressed separately; please see PT note for specific goal areas targeted.  Lolly Villarreal

## 2025-05-08 NOTE — ASSESSMENT & PLAN NOTE
Recent Labs     05/07/25  0513 05/07/25  1356 05/08/25  0523   CREATININE 1.33* 1.15 1.04   EGFR 56 67 76     Estimated Creatinine Clearance: 84.5 mL/min (by C-G formula based on SCr of 1.04 mg/dL).    POA 1.4; (baseline 0.8)  Likely prerenal    IV Fluids  Monitor BMP  Avoid hypoperfusion of the kidneys, minimize nephrotoxins

## 2025-05-08 NOTE — CASE MANAGEMENT
Case Management Assessment & Discharge Planning Note    Patient name Valentín Can  Location ICU 15/ICU 15 MRN [id number]  : 1960 Date 2025       Current Admission Date: 2025  Current Admission Diagnosis:Alcohol withdrawal (HCC)   Patient Active Problem List    Diagnosis Date Noted Date Diagnosed    Abnormal CXR 2025     Alcohol withdrawal (HCC) 2025     KATIE (acute kidney injury) ([facility]) 2025     Diarrhea 2025     Abnormal EKG 2025     Alcohol abuse 2025     Electrolyte abnormality 2025     Hypertension 2025     CAD (coronary artery disease) 2025     A-fib ([facility]) 2025     COPD (chronic obstructive pulmonary disease) ([facility]) 2025     Pancytopenia ([facility]) 2025     Fall 2025       LOS (days): 3  Geometric Mean LOS (GMLOS) (days): 3.4  Days to GMLOS:0.6     OBJECTIVE:    Risk of Unplanned Readmission Score: 21.84     Current admission status: Inpatient    Preferred Pharmacy:   UNKNOWN - FOLLOW UP PRIOR TO DISCHARGE TO E-PRESCRIBE  No address on file      Primary Care Provider: Lesly Rivera MD    Primary Insurance: [employer] MC REP  Secondary Insurance:     ASSESSMENT:  Active Health Care Proxies    There are no active Health Care Proxies on file.       Patient Information  Admitted from:: Home  Mental Status: Alert  During Assessment patient was accompanied by: Not accompanied during assessment  Assessment information provided by:: Patient  Primary Caregiver: Self  Support Systems: Self, Son  County of Residence: [place]  What city do you live in?: [place]  Home entry access options. Select all that apply.: Elevator  Type of Current Residence: Apartment  Floor Level: 3  Upon entering residence, is there a bedroom on the main floor (no further steps)?: Yes  Upon entering residence, is there a bathroom on the main floor (no further steps)?: Yes  Living Arrangements: Lives Alone    Activities of Daily Living Prior to  Admission  Functional Status: Independent  Completes ADLs independently?: Yes  Ambulates independently?: Yes  Does patient use assisted devices?: Yes  Assisted Devices (DME) used: Walker  Does patient currently own DME?: Yes  What DME does the patient currently own?: Walker  Does patient have a history of Outpatient Therapy (PT/OT)?: No  Does the patient have a history of Short-Term Rehab?: No  Does patient have a history of HHC?: No  Does patient currently have HHC?: No    Patient Information Continued  Income Source: SSI/SSD  Does patient have prescription coverage?: Yes  Can the patient afford their medications and any related supplies (such as glucometers or test strips)?: Yes  Does patient receive dialysis treatments?: Yes  Does patient have a history of substance abuse?: Yes  Historical substance use preference: Alcohol/ETOH    Means of Transportation  Means of Transport to Appts:: Public Transportation - Bus    DISCHARGE DETAILS:    Discharge planning discussed with:: Pt  Freedom of Choice: Yes  Comments - Freedom of Choice: Declines Home Therapy    Other Referral/Resources/Interventions Provided:  Interventions: HHC  Referral Comments: CM met with pt at bedside and introduced self/role with dcp. Pt resides in an apartment with Elevator access. Pt reports independent PTA and using a RW. CM reviewed therapy recs for home therapy. Pt is not agreeable at this time. Aware CM available if he decides he would like HHC prior to dc otherwise would need to f/u with his PCP to order. Pt confirmed he plans to go to OP treatment for alcohol abuse. He reports clinic is in walking distance and he has contact information.

## 2025-05-08 NOTE — PHYSICAL THERAPY NOTE
Physical Therapy Evaluation    Patient's Name: Valentín Can    Admitting Diagnosis  Head pain [R51.9]    Problem List  Patient Active Problem List   Diagnosis    Fall    Abnormal EKG    Alcohol abuse    Electrolyte abnormality    Hypertension    CAD (coronary artery disease)    A-fib (HCC)    COPD (chronic obstructive pulmonary disease) (HCC)    Pancytopenia (HCC)    Diarrhea    Alcohol withdrawal (HCC)    KATIE (acute kidney injury) (HCC)    Abnormal CXR       Past Medical History  History reviewed. No pertinent past medical history.    Past Surgical History  History reviewed. No pertinent surgical history.         25 1128   PT Last Visit   PT Visit Date 25   Note Type   Note type Evaluation   Pain Assessment   Pain Assessment Tool 0-10   Pain Score No Pain   Restrictions/Precautions   Weight Bearing Precautions Per Order No   Other Precautions Cognitive;Chair Alarm;Bed Alarm;Telemetry;Multiple lines;Fall Risk;Pain;Contact/isolation  (CIWA)   Home Living   Type of Home Apartment   Home Layout One level;Elevator  (4th floor)   Bathroom Shower/Tub Tub/shower unit   Bathroom Toilet Standard   Bathroom Equipment Grab bars in shower   Home Equipment Cane  (rollator)   Prior Function   Lives With Alone   Receives Help From Family   IADLs Family/Friend/Other provides transportation  (bus for transportation, son will get groceries)   Falls in the last 6 months >10   Vocational   (x1 fall leading to admission, per pt at least 20 in past 6 months)   Cognition   Orientation Level Oriented to person;Oriented to situation  (grossly to time)   Following Commands Follows one step commands with increased time or repetition   Comments pt ID by wristband, name and    Subjective   Subjective pt OOB in recliner, agreeable to PT eval   RLE Assessment   RLE Assessment X  (grossly 4/5 other than R ankle, chronic drop foot)   LLE Assessment   LLE Assessment X  (grossly 4/5)   Coordination   Movements are Fluid and  Coordinated 0   Coordination and Movement Description ataxic   Transfers   Sit to Stand 5  Supervision   Additional items Increased time required;Armrests   Stand to Sit 5  Supervision   Additional items Trapeze bar;Verbal cues   Additional Comments RW for transfers stands with wide JEREMY. use of RW   Ambulation/Elevation   Gait pattern Narrow JEREMY;Ataxia;Steppage;Inconsistent gordon   Gait Assistance 5  Supervision   Additional items Assist x 1;Verbal cues   Assistive Device Rolling walker   Distance 15'x4  (limited due to contact status)   Ambulation/Elevation Additional Comments expressess frustration utilizing RW for ambulation vs personal rollator that was unavailable. ataxic and impuslive at times. however no LOB/path deviation noted   Balance   Static Sitting Fair +   Dynamic Sitting Fair   Static Standing Fair -  (RW)   Dynamic Standing Fair -   Ambulatory Poor +  (RW)   Activity Tolerance   Activity Tolerance Patient limited by fatigue   Medical Staff Made Aware care coordinated with OT due to medical complexity and comorbidites and deviation from baseline, spoke with CM   Nurse Made Aware RN pre/post   Assessment   Prognosis Fair   Problem List Decreased strength;Decreased endurance;Impaired balance;Decreased mobility;Decreased cognition;Impaired judgement;Decreased safety awareness   Assessment Pt is a 65 y.o. male seen for PT evaluation s/p admit to [facility] on 5/4/2025. Pt was admitted with a primary dx of: alcohol withdrawal, head pain.  PT now consulted for assessment of mobility and d/c needs. Pt with OOB to chair orders.  Pts current comorbidities and personal factors effecting treatment include:  alcohol abuse, HTN, CAD, afib, COPD. Pts current clinical presentation is Unstable/Unpredictable (high complexity) due to Ongoing medical management for primary dx, Decreased activity tolerance compared to baseline, Fall risk, Increased assistance needed from caregiver at current time, Ongoing  telemetry monitoring, Cog status. Prior to admission, pt was independent with use of Rollator. Upon evaluation, pt currently is requiring; Supervision for transfers and Supervision for ambulation 15 ft w/ RW. Pt presents at PT eval functioning below baseline and currently w/ overall mobility deficits 2* to: BLE weakness, impaired balance, gait deviations, decreased activity tolerance compared to baseline, decreased functional mobility tolerance compared to baseline, decreased safety awareness, impaired judgement, fall risk. Pt currently at a fall risk 2* to impairments listed above.  Pt will continue to benefit from skilled acute PT interventions to address stated impairments; to maximize functional mobility; for ongoing pt/ family training; and DME needs. At conclusion of PT session chair alarm engaged, all needs in reach, RN notified of session findings/recommendations, and pt returned back in recliner chair with phone and call bell within reach. Pt denies any further questions at this time. Recommend Level III (Minimum Resource Intensity)  upon hospital D/C.   Goals   Patient Goals to not fall   STG Expiration Date 05/18/25   Short Term Goal #1 In 10 days pt will be able to: 1. Demonstrate ability to perform all aspects of bed mobility independently to improve functional safety.  2. Perform functional transfers independently to facilitate safe return to previous living environment.  3.  Ambulate 150 ft with RW independently with stable vitals to improve safety with household distances and reduce fall risk.  4. Improve LE strength grades by 1 to increase ease of functional mobility with transfers and gait. 5. Pt will demonstrate improved balance by one grade in order to decrease risk of falls.   PT Treatment Day 0   Plan   Treatment/Interventions Functional transfer training;LE strengthening/ROM;Elevations;Therapeutic exercise;Cognitive reorientation;Patient/family training;Equipment eval/education;Bed  mobility;Gait training;Spoke to nursing;Spoke to case management;OT   PT Frequency 1-2x/wk   Discharge Recommendation   Rehab Resource Intensity Level, PT III (Minimum Resource Intensity)   AM-PAC Basic Mobility Inpatient   Turning in Flat Bed Without Bedrails 3   Lying on Back to Sitting on Edge of Flat Bed Without Bedrails 3   Moving Bed to Chair 3   Standing Up From Chair Using Arms 3   Walk in Room 3   Climb 3-5 Stairs With Railing 2   Basic Mobility Inpatient Raw Score 17   Basic Mobility Standardized Score 39.67   [facility] Highest Level Of Mobility   -HLM Goal 5: Stand one or more mins   -HLM Achieved 7: Walk 25 feet or more   End of Consult   Patient Position at End of Consult Bedside chair;Bed/Chair alarm activated;All needs within reach   The patient's AM-PAC Basic Mobility Inpatient Short Form Raw Score is 17. A Raw score of greater than 16 suggests the patient may benefit from discharge to home. Please also refer to the recommendation of the Physical Therapist for safe discharge planning.    Daniel Raza, PT

## 2025-05-08 NOTE — ASSESSMENT & PLAN NOTE
Recent Labs     05/07/25  0513 05/07/25  1356 05/08/25  0523   SODIUM 130* 134* 133*      Recent Labs     05/06/25  0450 05/07/25  0513 05/08/25  0523   MG 1.4* 1.5* 1.6*      Recent Labs     05/07/25 0513   PHOS 2.1*       Lab Results   Component Value Date    OSMOUA 218 (L) 05/07/2025    NAUR 36.0 05/07/2025    OSMOLALITSER 284 05/07/2025     Likely hypovolemic hyponatremia     Monitor electrolytes and replete as needed. IVF as needed  Normal Saline infusion 100 mL/hr  Goal sodium increase 6-8 in 24 hours  Avoid over-correction to prevent osmotic demyelination syndrome  Encourage adequate oral intake  Monitor BMP QAM; Adjust fluid rate/concentration accordingly

## 2025-05-08 NOTE — ASSESSMENT & PLAN NOTE
66 yo M presented to the hospital after fall with headstrike on Eliquis 5/5. Hx of chronic alcohol abuse, Afib on Eliquis, HTN, CAD, COPD. Was evaluated by trauma who signed off with no acute treatment at this time. Had an episode of seizure like activity on the floor 5/6 at which point a rapid was called. Transferred to ICU for closer monitoring.    [facility] protocol  -Phenobarbital as needed  telemetry  -Thiamine  -Folic acid  -Vitamin B6

## 2025-05-08 NOTE — ASSESSMENT & PLAN NOTE
CXR read: right mid lung opacity, may represent aspiration pneumonitis, pneumonia, or atelectasis.    Patient is currently satting 100% on 2L NC ( he reportedly uses 2L on exertion at home) Afebrile. Vitals WNL. Lung auscultation without wheezing and good airflow.     Decision to monitor off abx.   Will continue to monitor for clinical signs of infection.

## 2025-05-08 NOTE — PLAN OF CARE
Problem: Potential for Falls  Goal: Patient will remain free of falls  Description: INTERVENTIONS:- Educate patient/family on patient safety including physical limitations- Instruct patient to call for assistance with activity - Consult OT/PT to assist with strengthening/mobility - Keep Call bell within reach- Keep bed low and locked with side rails adjusted as appropriate- Keep care items and personal belongings within reach- Initiate and maintain comfort rounds- Make Fall Risk Sign visible to staff- Offer Toileting every 2 Hours, in advance of need- Initiate/Maintain bed/chair alarm-  Apply yellow socks and bracelet for high fall risk patients- Consider moving patient to room near nurses station  Outcome: Progressing     Problem: Nutrition/Hydration-ADULT  Goal: Nutrient/Hydration intake appropriate for improving, restoring or maintaining nutritional needs  Description: Monitor and assess patient's nutrition/hydration status for malnutrition. Collaborate with interdisciplinary team and initiate plan and interventions as ordered.  Monitor patient's weight and dietary intake as ordered or per policy. Utilize nutrition screening tool and intervene as necessary. Determine patient's food preferences and provide high-protein, high-caloric foods as appropriate. INTERVENTIONS:- Monitor oral intake, urinary output, labs, and treatment plans- Assess nutrition and hydration status and recommend course of action- Evaluate amount of meals eaten- Assist patient with eating if necessary - Allow adequate time for meals- Recommend/ encourage appropriate diets, oral nutritional supplements, and vitamin/mineral supplements- Order, calculate, and assess calorie counts as needed- Recommend, monitor, and adjust tube feedings and TPN/PPN based on assessed needs- Assess need for intravenous fluids- Provide specific nutrition/hydration education as appropriate- Include patient/family/caregiver in decisions related to nutrition  Outcome:  Progressing     Problem: Prexisting or High Potential for Compromised Skin Integrity  Goal: Skin integrity is maintained or improved  Description: INTERVENTIONS:- Identify patients at risk for skin breakdown- Assess and monitor skin integrity- Assess and monitor nutrition and hydration status- Monitor labs - Assess for incontinence - Turn and reposition patient- Assist with mobility/ambulation- Relieve pressure over bony prominences- Avoid friction and shearing- Provide appropriate hygiene as needed including keeping skin clean and dry- Evaluate need for skin moisturizer/barrier cream- Collaborate with interdisciplinary team - Patient/family teaching- Consider wound care consult   Outcome: Progressing     Problem: SAFETY,RESTRAINT: NV/NON-SELF DESTRUCTIVE BEHAVIOR  Goal: Remains free of harm/injury (restraint for non violent/non self-detsructive behavior)  Description: INTERVENTIONS:- Instruct patient/family regarding restraint use - Assess and monitor physiologic and psychological status - Provide interventions and comfort measures to meet assessed patient needs - Identify and implement measures to help patient regain control- Assess readiness for release of restraint   Outcome: Progressing  Goal: Returns to optimal restraint-free functioning  Description: INTERVENTIONS:- Assess the patient's behavior and symptoms that indicate continued need for restraint- Identify and implement measures to help patient regain control- Assess readiness for release of restraint   Outcome: Progressing

## 2025-05-08 NOTE — INCIDENTAL FINDINGS
The following findings require follow up:  Radiographic finding   Finding: Bilateral adrenal gland nodules. Follow-up adrenal mass protocol CT or MRI is recommended in 1 year.    Follow up required: Follow-up adrenal mass protocol CT or MRI is recommended in 1 year.    Follow up should be done within 1 year     Please notify your primary care physician to assist with the follow up    Incidental finding results were discussed with the Patient by Helen Haas MD on 05/08/25.   They expressed understanding and all questions answered.

## 2025-05-09 VITALS
TEMPERATURE: 97.7 F | HEIGHT: 74 IN | SYSTOLIC BLOOD PRESSURE: 131 MMHG | TEMPERATURE: 97.7 F | HEART RATE: 69 BPM | DIASTOLIC BLOOD PRESSURE: 85 MMHG | HEIGHT: 74 IN | SYSTOLIC BLOOD PRESSURE: 131 MMHG | BODY MASS INDEX: 24.33 KG/M2 | DIASTOLIC BLOOD PRESSURE: 85 MMHG | BODY MASS INDEX: 24.33 KG/M2 | WEIGHT: 189.6 LBS | RESPIRATION RATE: 18 BRPM | WEIGHT: 189.6 LBS | OXYGEN SATURATION: 94 % | RESPIRATION RATE: 18 BRPM | HEART RATE: 69 BPM | OXYGEN SATURATION: 94 %

## 2025-05-09 PROBLEM — A04.71 RECURRENT CLOSTRIDIOIDES DIFFICILE DIARRHEA: Status: ACTIVE | Noted: 2025-05-06

## 2025-05-09 PROBLEM — R93.7 ABNORMAL CT SCAN, CERVICAL SPINE: Status: ACTIVE | Noted: 2025-05-09

## 2025-05-09 LAB
ALBUMIN SERPL BCG-MCNC: 2.5 G/DL (ref 3.5–5)
ALBUMIN SERPL BCG-MCNC: 2.5 G/DL (ref 3.5–5)
ALP SERPL-CCNC: 84 U/L (ref 34–104)
ALP SERPL-CCNC: 84 U/L (ref 34–104)
ALT SERPL W P-5'-P-CCNC: 19 U/L (ref 7–52)
ALT SERPL W P-5'-P-CCNC: 19 U/L (ref 7–52)
ANION GAP SERPL CALCULATED.3IONS-SCNC: 6 MMOL/L (ref 4–13)
ANION GAP SERPL CALCULATED.3IONS-SCNC: 6 MMOL/L (ref 4–13)
AST SERPL W P-5'-P-CCNC: 37 U/L (ref 13–39)
AST SERPL W P-5'-P-CCNC: 37 U/L (ref 13–39)
BILIRUB SERPL-MCNC: 0.5 MG/DL (ref 0.2–1)
BILIRUB SERPL-MCNC: 0.5 MG/DL (ref 0.2–1)
BUN SERPL-MCNC: 17 MG/DL (ref 5–25)
BUN SERPL-MCNC: 17 MG/DL (ref 5–25)
CALCIUM ALBUM COR SERPL-MCNC: 8.4 MG/DL (ref 8.3–10.1)
CALCIUM ALBUM COR SERPL-MCNC: 8.4 MG/DL (ref 8.3–10.1)
CALCIUM SERPL-MCNC: 7.2 MG/DL (ref 8.4–10.2)
CALCIUM SERPL-MCNC: 7.2 MG/DL (ref 8.4–10.2)
CHLORIDE SERPL-SCNC: 102 MMOL/L (ref 96–108)
CHLORIDE SERPL-SCNC: 102 MMOL/L (ref 96–108)
CO2 SERPL-SCNC: 24 MMOL/L (ref 21–32)
CO2 SERPL-SCNC: 24 MMOL/L (ref 21–32)
CREAT SERPL-MCNC: 0.85 MG/DL (ref 0.6–1.3)
CREAT SERPL-MCNC: 0.85 MG/DL (ref 0.6–1.3)
ERYTHROCYTE [DISTWIDTH] IN BLOOD BY AUTOMATED COUNT: 19.9 % (ref 11.6–15.1)
ERYTHROCYTE [DISTWIDTH] IN BLOOD BY AUTOMATED COUNT: 19.9 % (ref 11.6–15.1)
GFR SERPL CREATININE-BSD FRML MDRD: 92 ML/MIN/1.73SQ M
GFR SERPL CREATININE-BSD FRML MDRD: 92 ML/MIN/1.73SQ M
GLUCOSE SERPL-MCNC: 105 MG/DL (ref 65–140)
GLUCOSE SERPL-MCNC: 105 MG/DL (ref 65–140)
GLUCOSE SERPL-MCNC: 108 MG/DL (ref 65–140)
GLUCOSE SERPL-MCNC: 108 MG/DL (ref 65–140)
GLUCOSE SERPL-MCNC: 131 MG/DL (ref 65–140)
GLUCOSE SERPL-MCNC: 131 MG/DL (ref 65–140)
HCT VFR BLD AUTO: 26.7 % (ref 36.5–49.3)
HCT VFR BLD AUTO: 26.7 % (ref 36.5–49.3)
HGB BLD-MCNC: 8.2 G/DL (ref 12–17)
HGB BLD-MCNC: 8.2 G/DL (ref 12–17)
MAGNESIUM SERPL-MCNC: 1.6 MG/DL (ref 1.9–2.7)
MAGNESIUM SERPL-MCNC: 1.6 MG/DL (ref 1.9–2.7)
MCH RBC QN AUTO: 31.3 PG (ref 26.8–34.3)
MCH RBC QN AUTO: 31.3 PG (ref 26.8–34.3)
MCHC RBC AUTO-ENTMCNC: 30.7 G/DL (ref 31.4–37.4)
MCHC RBC AUTO-ENTMCNC: 30.7 G/DL (ref 31.4–37.4)
MCV RBC AUTO: 102 FL (ref 82–98)
MCV RBC AUTO: 102 FL (ref 82–98)
PHOSPHATE SERPL-MCNC: 2.2 MG/DL (ref 2.3–4.1)
PHOSPHATE SERPL-MCNC: 2.2 MG/DL (ref 2.3–4.1)
PLATELET # BLD AUTO: 79 THOUSANDS/UL (ref 149–390)
PLATELET # BLD AUTO: 79 THOUSANDS/UL (ref 149–390)
PMV BLD AUTO: 12 FL (ref 8.9–12.7)
PMV BLD AUTO: 12 FL (ref 8.9–12.7)
POTASSIUM SERPL-SCNC: 4.2 MMOL/L (ref 3.5–5.3)
POTASSIUM SERPL-SCNC: 4.2 MMOL/L (ref 3.5–5.3)
PROT SERPL-MCNC: 5.3 G/DL (ref 6.4–8.4)
PROT SERPL-MCNC: 5.3 G/DL (ref 6.4–8.4)
RBC # BLD AUTO: 2.62 MILLION/UL (ref 3.88–5.62)
RBC # BLD AUTO: 2.62 MILLION/UL (ref 3.88–5.62)
SODIUM SERPL-SCNC: 132 MMOL/L (ref 135–147)
SODIUM SERPL-SCNC: 132 MMOL/L (ref 135–147)
WBC # BLD AUTO: 4.22 THOUSAND/UL (ref 4.31–10.16)
WBC # BLD AUTO: 4.22 THOUSAND/UL (ref 4.31–10.16)

## 2025-05-09 PROCEDURE — 80053 COMPREHEN METABOLIC PANEL: CPT

## 2025-05-09 PROCEDURE — 83735 ASSAY OF MAGNESIUM: CPT

## 2025-05-09 PROCEDURE — 84100 ASSAY OF PHOSPHORUS: CPT

## 2025-05-09 PROCEDURE — 99239 HOSP IP/OBS DSCHRG MGMT >30: CPT | Performed by: INTERNAL MEDICINE

## 2025-05-09 PROCEDURE — 85027 COMPLETE CBC AUTOMATED: CPT

## 2025-05-09 PROCEDURE — 82948 REAGENT STRIP/BLOOD GLUCOSE: CPT

## 2025-05-09 RX ORDER — VANCOMYCIN HYDROCHLORIDE 125 MG/1
125 CAPSULE ORAL DAILY
Status: DISCONTINUED | OUTPATIENT
Start: 2025-05-30 | End: 2025-05-09 | Stop reason: HOSPADM

## 2025-05-09 RX ORDER — VANCOMYCIN HYDROCHLORIDE 125 MG/1
125 CAPSULE ORAL EVERY 12 HOURS SCHEDULED
Status: DISCONTINUED | OUTPATIENT
Start: 2025-05-22 | End: 2025-05-09 | Stop reason: HOSPADM

## 2025-05-09 RX ORDER — VANCOMYCIN HYDROCHLORIDE 125 MG/1
CAPSULE ORAL
Qty: 84 CAPSULE | Refills: 0 | Status: ON HOLD | OUTPATIENT
Start: 2025-05-09 | End: 2025-06-27

## 2025-05-09 RX ORDER — VANCOMYCIN HYDROCHLORIDE 125 MG/1
125 CAPSULE ORAL EVERY 6 HOURS SCHEDULED
Status: DISCONTINUED | OUTPATIENT
Start: 2025-05-09 | End: 2025-05-09 | Stop reason: HOSPADM

## 2025-05-09 RX ORDER — MAGNESIUM SULFATE HEPTAHYDRATE 40 MG/ML
4 INJECTION, SOLUTION INTRAVENOUS ONCE
Status: COMPLETED | OUTPATIENT
Start: 2025-05-09 | End: 2025-05-09

## 2025-05-09 RX ORDER — METOPROLOL SUCCINATE 100 MG/1
100 TABLET, EXTENDED RELEASE ORAL 2 TIMES DAILY
Qty: 60 TABLET | Refills: 1 | Status: ON HOLD | OUTPATIENT
Start: 2025-05-09 | End: 2025-07-08

## 2025-05-09 RX ORDER — VANCOMYCIN HYDROCHLORIDE 125 MG/1
125 CAPSULE ORAL EVERY OTHER DAY
Status: DISCONTINUED | OUTPATIENT
Start: 2025-06-06 | End: 2025-05-09 | Stop reason: HOSPADM

## 2025-05-09 RX ADMIN — POTASSIUM & SODIUM PHOSPHATES POWDER PACK 280-160-250 MG 1 PACKET: 280-160-250 PACK at 07:55

## 2025-05-09 RX ADMIN — PANTOPRAZOLE SODIUM 40 MG: 40 TABLET, DELAYED RELEASE ORAL at 05:00

## 2025-05-09 RX ADMIN — FLUTICASONE FUROATE AND VILANTEROL TRIFENATATE 1 PUFF: 200; 25 POWDER RESPIRATORY (INHALATION) at 09:31

## 2025-05-09 RX ADMIN — METOPROLOL SUCCINATE 75 MG: 50 TABLET, EXTENDED RELEASE ORAL at 09:29

## 2025-05-09 RX ADMIN — VANCOMYCIN HYDROCHLORIDE 125 MG: 125 CAPSULE ORAL at 05:00

## 2025-05-09 RX ADMIN — FERROUS SULFATE TAB 325 MG (65 MG ELEMENTAL FE) 325 MG: 325 (65 FE) TAB at 07:55

## 2025-05-09 RX ADMIN — ASPIRIN 81 MG CHEWABLE TABLET 81 MG: 81 TABLET CHEWABLE at 09:29

## 2025-05-09 RX ADMIN — PYRIDOXINE HCL TAB 50 MG 100 MG: 50 TAB at 09:30

## 2025-05-09 RX ADMIN — Medication 250 MG: at 09:30

## 2025-05-09 RX ADMIN — ATORVASTATIN CALCIUM 40 MG: 40 TABLET, FILM COATED ORAL at 09:30

## 2025-05-09 RX ADMIN — NALTREXONE HYDROCHLORIDE 50 MG: 50 TABLET, FILM COATED ORAL at 09:58

## 2025-05-09 RX ADMIN — GABAPENTIN 300 MG: 300 CAPSULE ORAL at 09:29

## 2025-05-09 RX ADMIN — RANOLAZINE 500 MG: 500 TABLET, FILM COATED, EXTENDED RELEASE ORAL at 09:30

## 2025-05-09 RX ADMIN — NICOTINE 1 PATCH: 21 PATCH, EXTENDED RELEASE TRANSDERMAL at 09:29

## 2025-05-09 RX ADMIN — Medication 100 MG: at 09:29

## 2025-05-09 RX ADMIN — APIXABAN 5 MG: 5 TABLET, FILM COATED ORAL at 09:29

## 2025-05-09 RX ADMIN — FOLIC ACID 1 MG: 1 TABLET ORAL at 09:30

## 2025-05-09 RX ADMIN — MAGNESIUM SULFATE HEPTAHYDRATE 4 G: 40 INJECTION, SOLUTION INTRAVENOUS at 08:14

## 2025-05-09 RX ADMIN — DILTIAZEM HYDROCHLORIDE 120 MG: 60 CAPSULE, EXTENDED RELEASE ORAL at 09:58

## 2025-05-09 RX ADMIN — Medication 400 MG: at 09:29

## 2025-05-09 NOTE — ASSESSMENT & PLAN NOTE
Per CT C-spine 5/4/25: Status post extensive cervical laminectomy from C3-C7. There is a thick-walled fluid collection at the surgical site posteriorly within the neck that measures 2.0 x 3.6 cm on image 54 of series 305. This is suggestive of a seroma or pseudomeningocele.  This is not present on previous CT C-spines from April/March/February, etc. However it is present on a CT scan from 2021. Patient currently asymptomatic, denies cervical issues, including tenderness/pain and no associated deficits.  Afebrile.  Likely benign.  Reached out to on-call inpatient neurosurgery. Hart following up with outpatient NSGY given history of laminectomies; referral placed.

## 2025-05-09 NOTE — ASSESSMENT & PLAN NOTE
Pt reports having diarrhea with each episode of binge drinking. However pt reporting new onset nonbloody diarrhea as of 5/7.  Denies abdominal pain. Afebrile, no leucocytosis. Per chart review, pt had been on ABX within the past month.    C. difficile positive. PO vancomycin had been initiated on 5/8.    Plan:  Given patient has had a previous episode of C. difficile per information available in chart (2021), this is the first recurrence of C. difficile.  As such, will treat with the following course:  PO vancomycin 125 mg QID x 14 days  PO vancomycin 125 mg BID x 7 days  PO vancomycin 125 mg QD x 7 days  PO vancomycin 125 mg every other day x 21 days  Florastor probiotics

## 2025-05-09 NOTE — PLAN OF CARE
Problem: Potential for Falls  Goal: Patient will remain free of falls  Description: INTERVENTIONS:- Educate patient/family on patient safety including physical limitations- Instruct patient to call for assistance with activity - Consult OT/PT to assist with strengthening/mobility - Keep Call bell within reach- Keep bed low and locked with side rails adjusted as appropriate- Keep care items and personal belongings within reach- Initiate and maintain comfort rounds- Make Fall Risk Sign visible to staff- Offer Toileting every 2 Hours, in advance of need- Initiate/Maintain bedalarm- Obtain necessary fall risk management equipment: bed- Apply yellow socks and bracelet for high fall risk patients- Consider moving patient to room near nurses station  Outcome: Progressing     Problem: Nutrition/Hydration-ADULT  Goal: Nutrient/Hydration intake appropriate for improving, restoring or maintaining nutritional needs  Description: Monitor and assess patient's nutrition/hydration status for malnutrition. Collaborate with interdisciplinary team and initiate plan and interventions as ordered.  Monitor patient's weight and dietary intake as ordered or per policy. Utilize nutrition screening tool and intervene as necessary. Determine patient's food preferences and provide high-protein, high-caloric foods as appropriate. INTERVENTIONS:- Monitor oral intake, urinary output, labs, and treatment plans- Assess nutrition and hydration status and recommend course of action- Evaluate amount of meals eaten- Assist patient with eating if necessary - Allow adequate time for meals- Recommend/ encourage appropriate diets, oral nutritional supplements, and vitamin/mineral supplements- Order, calculate, and assess calorie counts as needed- Recommend, monitor, and adjust tube feedings and TPN/PPN based on assessed needs- Assess need for intravenous fluids- Provide specific nutrition/hydration education as appropriate- Include  patient/family/caregiver in decisions related to nutrition  Outcome: Progressing     Problem: Prexisting or High Potential for Compromised Skin Integrity  Goal: Skin integrity is maintained or improved  Description: INTERVENTIONS:- Identify patients at risk for skin breakdown- Assess and monitor skin integrity- Assess and monitor nutrition and hydration status- Monitor labs - Assess for incontinence - Turn and reposition patient- Assist with mobility/ambulation- Relieve pressure over bony prominences- Avoid friction and shearing- Provide appropriate hygiene as needed including keeping skin clean and dry- Evaluate need for skin moisturizer/barrier cream- Collaborate with interdisciplinary team - Patient/family teaching- Consider wound care consult   Outcome: Progressing     Problem: SAFETY,RESTRAINT: NV/NON-SELF DESTRUCTIVE BEHAVIOR  Goal: Remains free of harm/injury (restraint for non violent/non self-detsructive behavior)  Description: INTERVENTIONS:- Instruct patient/family regarding restraint use - Assess and monitor physiologic and psychological status - Provide interventions and comfort measures to meet assessed patient needs - Identify and implement measures to help patient regain control- Assess readiness for release of restraint   Outcome: Progressing  Goal: Returns to optimal restraint-free functioning  Description: INTERVENTIONS:- Assess the patient's behavior and symptoms that indicate continued need for restraint- Identify and implement measures to help patient regain control- Assess readiness for release of restraint   Outcome: Progressing

## 2025-05-09 NOTE — PLAN OF CARE
Problem: Potential for Falls  Goal: Patient will remain free of falls  Description: INTERVENTIONS:- Educate patient/family on patient safety including physical limitations- Instruct patient to call for assistance with activity - Consult OT/PT to assist with strengthening/mobility - Keep Call bell within reach- Keep bed low and locked with side rails adjusted as appropriate- Keep care items and personal belongings within reach- Initiate and maintain comfort patients- Consider moving patient to room near nurses station  Outcome: Progressing     Problem: Nutrition/Hydration-ADULT  Goal: Nutrient/Hydration intake appropriate for improving, restoring or maintaining nutritional needs  Description: Monitor and assess patient's nutrition/hydration status for malnutrition. Collaborate with interdisciplinary team and initiate plan and interventions as ordered.  Monitor patient's weight and dietary intake as ordered or per policy. Utilize nutrition screening tool and intervene as necessary. Determine patient's food preferences and provide high-protein, high-caloric foods as appropriate. INTERVENTIONS:- Monitor oral intake, urinary output, labs, and treatment plans- Assess nutrition and hydration status and recommend course of action- Evaluate amount of meals eaten- Assist patient with eating if necessary - Allow adequate time for meals- Recommend/ encourage appropriate diets, oral nutritional supplements, and vitamin/mineral supplements- Order, calculate, and assess calorie counts as needed- Recommend, monitor, and adjust tube feedings and TPN/PPN based on assessed needs- Assess need for intravenous fluids- Provide specific nutrition/hydration education as appropriate- Include patient/family/caregiver in decisions related to nutrition  Outcome: Progressing     Problem: Prexisting or High Potential for Compromised Skin Integrity  Goal: Skin integrity is maintained or improved  Description: INTERVENTIONS:- Identify patients at  risk for skin breakdown- Assess and monitor skin integrity- Assess and monitor nutrition and hydration status- Monitor labs - Assess for incontinence - Turn and reposition patient- Assist with mobility/ambulation- Relieve pressure over bony prominences- Avoid friction and shearing- Provide appropriate hygiene as needed including keeping skin clean and dry- Evaluate need for skin moisturizer/barrier cream- Collaborate with interdisciplinary team - Patient/family teaching- Consider wound care consult   Outcome: Progressing     Problem: SAFETY,RESTRAINT: NV/NON-SELF DESTRUCTIVE BEHAVIOR  Goal: Remains free of harm/injury (restraint for non violent/non self-detsructive behavior)  Description: INTERVENTIONS:- Instruct patient/family regarding restraint use - Assess and monitor physiologic and psychological status - Provide interventions and comfort measures to meet assessed patient needs - Identify and implement measures to help patient regain control- Assess readiness for release of restraint   Outcome: Progressing  Goal: Returns to optimal restraint-free functioning  Description: INTERVENTIONS:- Assess the patient's behavior and symptoms that indicate continued need for restraint- Identify and implement measures to help patient regain control- Assess readiness for release of restraint   Outcome: Progressing

## 2025-05-09 NOTE — CASE MANAGEMENT
Case Management Discharge Planning Note    Patient name Valentín Can  Location ICU 15/ICU 15 MRN [id number]  : 1960 Date 2025       Current Admission Date: 2025  Current Admission Diagnosis:Alcohol withdrawal ([facility])   Patient Active Problem List    Diagnosis Date Noted Date Diagnosed    Abnormal CT scan, cervical spine 2025     Abnormal CXR 2025     Alcohol withdrawal ([facility]) 2025     KATIE (acute kidney injury) ([facility]) 2025     Diarrhea 2025     Abnormal EKG 2025     Alcohol abuse 2025     Electrolyte abnormality 2025     Hypertension 2025     CAD (coronary artery disease) 2025     A-fib ([facility]) 2025     COPD (chronic obstructive pulmonary disease) ([facility]) 2025     Pancytopenia ([facility]) 2025     Fall 2025       LOS (days): 4  Geometric Mean LOS (GMLOS) (days): 3.4  Days to GMLOS:-0.6     OBJECTIVE:  Risk of Unplanned Readmission Score: 21.5         Current admission status: Inpatient   Preferred Pharmacy:   UNKNOWN - FOLLOW UP PRIOR TO DISCHARGE TO E-PRESCRIBE  No address on file      [place] [address]  Phone: [phone] Fax: [phone]    Primary Care Provider: Lesly Rivera MD    Primary Insurance: [employer] REP  Secondary Insurance:     DISCHARGE DETAILS:    Discharge planning discussed with:: Pt  Freedom of Choice: Yes  Comments - Freedom of Choice: Home    Contacts  Patient Contacts: SonValentín Jr  Relationship to Patient:: Family  Contact Method: Phone  Phone Number: Left VM  Reason/Outcome: Emergency Contact    Other Referral/Resources/Interventions Provided:  Interventions: Transportation  Referral Comments: CM met with pt this morning. He reports he needs a ride home and his son is currently working out of state. He does report his son has his keys. CM made several attempts to get ahold of the son to see if he could  provide transportation. CM met with pt again and notified him that CM has been unable to get ahold of his son. Pt reports he does have his keys but his door is open. He reports he can get into his apartment. Pt aware CM will arrange w/c van for transort home. Pt confirmed he has his walker at home. Pt confirmed his address on the facesheet is correct.    Treatment Team Recommendation: Home  Discharge Destination Plan:: Home

## 2025-05-09 NOTE — CASE MANAGEMENT
Case Management Discharge Planning Note    Patient name Valentín Can  Location ICU 15/ICU 15 MRN [id number]  : 1960 Date 2025       Current Admission Date: 2025  Current Admission Diagnosis:Alcohol withdrawal ([facility])   Patient Active Problem List    Diagnosis Date Noted Date Diagnosed    Abnormal CT scan, cervical spine 2025     Abnormal CXR 2025     Alcohol withdrawal ([facility]) 2025     KATIE (acute kidney injury) ([facility]) 2025     Diarrhea 2025     Abnormal EKG 2025     Alcohol abuse 2025     Electrolyte abnormality 2025     Hypertension 2025     CAD (coronary artery disease) 2025     A-fib ([facility]) 2025     COPD (chronic obstructive pulmonary disease) ([facility]) 2025     Pancytopenia ([facility]) 2025     Fall 2025       LOS (days): 4  Geometric Mean LOS (GMLOS) (days): 3.4  Days to GMLOS:-0.6     OBJECTIVE:  Risk of Unplanned Readmission Score: 21.5         Current admission status: Inpatient   Preferred Pharmacy:   UNKNOWN - FOLLOW UP PRIOR TO DISCHARGE TO E-PRESCRIBE  No address on file      [place] [address]  Phone: [phone] Fax: [phone]    Primary Care Provider: Lesly Rivera MD    Primary Insurance: [employer] MC REP  Secondary Insurance:     DISCHARGE DETAILS:    Treatment Team Recommendation: Home with Home Health Care  Discharge Destination Plan:: Home  Transport at Discharge : Wheelchair van     Number/Name of Dispatcher: Round Trip [phone]  Transported by (Company and Unit #): [employer]  ETA of Transport (Date): 25  ETA of Transport (Time): 1600 (Resident, RN, and pt)    IMM Given (Date):: 25  IMM Given to:: Patient    IMM reviewed with patient, patient agrees with discharge determination.

## 2025-05-09 NOTE — DISCHARGE INSTR - AVS FIRST PAGE
Dear Valentín Can,     It was our pleasure to care for you here at [facility].  It is our hope that we were always able to exceed the expected standards for your care during your stay.  You were hospitalized due to a fall.  You were cared for on the 2S floor under the service of Betsy Weinberg MD with the [facility] who covers for your primary care physician (PCP), Lesly Rivera MD, while you were hospitalized.  If you have any questions or concerns related to this hospitalization, you may contact us at .  For follow up as well as any medication refills, we recommend that you follow up with your primary care physician.  A registered nurse will reach out to you by phone within a few days after your discharge to answer any additional questions that you may have after going home.  However, at this time we provide for you here, the most important instructions / recommendations at discharge:       Notable Medication Adjustments -   Please decrease your metoprolol succinate (Toprol-XL) to 100 mg twice daily.  Please complete your course of Vancomycin as below:  Vancomycin 125 mg four times daily until 5/22/25.  Vancomycin 125 mg twice daily until 5/29/25.  Vancomycin 125 mg once daily until 6/5/25.  Vancomycin 125 mg every other day until 6/26/25.  Please continue taking the rest of your medications as prescribed.  Testing Required after Discharge -   CBC, BMP within 1 week.  Adrenal mass protocol CT or MRI in 1 year.  ** Please contact your PCP to request testing orders for any of the testing recommended here **  Important follow up information -   Please follow-up with your PCP within 1 week of hospital discharge.  Please call [phone] to schedule a Cardiology appointment.   Please call [phone] to follow-up with Neurosurgery regarding small fluid collection around laminectomy site.  Other Instructions -   If your symptoms  worsen, or you develop new symptoms, including worsening weakness, diarrhea, chest pain, shortness of breath, palpitations, and/or lightheadedness, please call your doctor or report to the nearest ER.  Please abstain from alcohol/substance use.     Please review this entire after visit summary as additional general instructions including medication list, appointments, activity, diet, any pertinent wound care, and other additional recommendations from your care team that may be provided for you. Thank you!

## 2025-05-12 ENCOUNTER — HOSPITAL ENCOUNTER (INPATIENT)
Facility: HOSPITAL | Age: 65
LOS: 1 days | Discharge: HOME | DRG: 309 | End: 2025-05-14
Attending: STUDENT IN AN ORGANIZED HEALTH CARE EDUCATION/TRAINING PROGRAM | Admitting: INTERNAL MEDICINE
Payer: COMMERCIAL

## 2025-05-12 DIAGNOSIS — F10.90 CHRONIC ALCOHOL USE: ICD-10-CM

## 2025-05-12 DIAGNOSIS — E86.0 DEHYDRATION: ICD-10-CM

## 2025-05-12 DIAGNOSIS — Z65.9 MEDICATION NONADHERENCE DUE TO PSYCHOSOCIAL PROBLEM: ICD-10-CM

## 2025-05-12 DIAGNOSIS — I48.91 ATRIAL FIBRILLATION, RAPID (HCC): ICD-10-CM

## 2025-05-12 DIAGNOSIS — Z91.148 MEDICATION NONADHERENCE DUE TO PSYCHOSOCIAL PROBLEM: ICD-10-CM

## 2025-05-12 DIAGNOSIS — F10.929 ALCOHOL INTOXICATION (HCC): Primary | ICD-10-CM

## 2025-05-12 DIAGNOSIS — R26.2 AMBULATORY DYSFUNCTION: ICD-10-CM

## 2025-05-12 DIAGNOSIS — F10.90 ALCOHOL USE DISORDER: ICD-10-CM

## 2025-05-12 DIAGNOSIS — J44.9 CHRONIC OBSTRUCTIVE PULMONARY DISEASE, UNSPECIFIED COPD TYPE (HCC): ICD-10-CM

## 2025-05-12 LAB
ANION GAP SERPL CALCULATED.3IONS-SCNC: 14 MMOL/L (ref 4–13)
BASOPHILS # BLD AUTO: 0.13 THOUSANDS/ÂΜL (ref 0–0.1)
BASOPHILS NFR BLD AUTO: 3 % (ref 0–1)
BUN SERPL-MCNC: 9 MG/DL (ref 5–25)
CALCIUM SERPL-MCNC: 7.2 MG/DL (ref 8.4–10.2)
CARDIAC TROPONIN I PNL SERPL HS: 28 NG/L (ref ?–50)
CHLORIDE SERPL-SCNC: 102 MMOL/L (ref 96–108)
CO2 SERPL-SCNC: 24 MMOL/L (ref 21–32)
CREAT SERPL-MCNC: 0.8 MG/DL (ref 0.6–1.3)
EOSINOPHIL # BLD AUTO: 0.03 THOUSAND/ÂΜL (ref 0–0.61)
EOSINOPHIL NFR BLD AUTO: 1 % (ref 0–6)
ERYTHROCYTE [DISTWIDTH] IN BLOOD BY AUTOMATED COUNT: 20.2 % (ref 11.6–15.1)
GFR SERPL CREATININE-BSD FRML MDRD: 95 ML/MIN/1.73SQ M
GLUCOSE SERPL-MCNC: 80 MG/DL (ref 65–140)
HCT VFR BLD AUTO: 31.8 % (ref 36.5–49.3)
HGB BLD-MCNC: 10.3 G/DL (ref 12–17)
IMM GRANULOCYTES # BLD AUTO: 0.04 THOUSAND/UL (ref 0–0.2)
IMM GRANULOCYTES NFR BLD AUTO: 1 % (ref 0–2)
LYMPHOCYTES # BLD AUTO: 1.43 THOUSANDS/ÂΜL (ref 0.6–4.47)
LYMPHOCYTES NFR BLD AUTO: 30 % (ref 14–44)
MCH RBC QN AUTO: 31.6 PG (ref 26.8–34.3)
MCHC RBC AUTO-ENTMCNC: 32.4 G/DL (ref 31.4–37.4)
MCV RBC AUTO: 98 FL (ref 82–98)
MONOCYTES # BLD AUTO: 0.76 THOUSAND/ÂΜL (ref 0.17–1.22)
MONOCYTES NFR BLD AUTO: 16 % (ref 4–12)
NEUTROPHILS # BLD AUTO: 2.43 THOUSANDS/ÂΜL (ref 1.85–7.62)
NEUTS SEG NFR BLD AUTO: 49 % (ref 43–75)
NRBC BLD AUTO-RTO: 0 /100 WBCS
PLATELET # BLD AUTO: 207 THOUSANDS/UL (ref 149–390)
PMV BLD AUTO: 9.9 FL (ref 8.9–12.7)
POTASSIUM SERPL-SCNC: 4.8 MMOL/L (ref 3.5–5.3)
RBC # BLD AUTO: 3.26 MILLION/UL (ref 3.88–5.62)
SODIUM SERPL-SCNC: 140 MMOL/L (ref 135–147)
WBC # BLD AUTO: 4.82 THOUSAND/UL (ref 4.31–10.16)

## 2025-05-12 PROCEDURE — 96368 THER/DIAG CONCURRENT INF: CPT

## 2025-05-12 PROCEDURE — 84484 ASSAY OF TROPONIN QUANT: CPT | Performed by: STUDENT IN AN ORGANIZED HEALTH CARE EDUCATION/TRAINING PROGRAM

## 2025-05-12 PROCEDURE — NC001 PR NO CHARGE: Performed by: EMERGENCY MEDICINE

## 2025-05-12 PROCEDURE — 96366 THER/PROPH/DIAG IV INF ADDON: CPT

## 2025-05-12 PROCEDURE — 99284 EMERGENCY DEPT VISIT MOD MDM: CPT

## 2025-05-12 PROCEDURE — 96365 THER/PROPH/DIAG IV INF INIT: CPT

## 2025-05-12 PROCEDURE — 85025 COMPLETE CBC W/AUTO DIFF WBC: CPT | Performed by: STUDENT IN AN ORGANIZED HEALTH CARE EDUCATION/TRAINING PROGRAM

## 2025-05-12 PROCEDURE — 93005 ELECTROCARDIOGRAM TRACING: CPT

## 2025-05-12 PROCEDURE — 80048 BASIC METABOLIC PNL TOTAL CA: CPT | Performed by: STUDENT IN AN ORGANIZED HEALTH CARE EDUCATION/TRAINING PROGRAM

## 2025-05-12 PROCEDURE — 99285 EMERGENCY DEPT VISIT HI MDM: CPT | Performed by: STUDENT IN AN ORGANIZED HEALTH CARE EDUCATION/TRAINING PROGRAM

## 2025-05-12 PROCEDURE — 36415 COLL VENOUS BLD VENIPUNCTURE: CPT | Performed by: STUDENT IN AN ORGANIZED HEALTH CARE EDUCATION/TRAINING PROGRAM

## 2025-05-12 RX ADMIN — DEXTROSE AND SODIUM CHLORIDE 1000 ML: 5; .9 INJECTION, SOLUTION INTRAVENOUS at 22:00

## 2025-05-12 RX ADMIN — THIAMINE HYDROCHLORIDE 100 MG: 100 INJECTION, SOLUTION INTRAMUSCULAR; INTRAVENOUS at 22:15

## 2025-05-12 NOTE — UTILIZATION REVIEW
NOTIFICATION OF ADMISSION DISCHARGE   This is a Notification of Discharge from [facility]. Please be advised that this patient has been discharge from our facility. Below you will find the admission and discharge date and time including the patient’s disposition.   UTILIZATION REVIEW CONTACT:  Utilization Review Assistants  Network Utilization Review Department  Phone: [phone] x carefully listen to the prompts. All voicemails are confidential.  Email: [email or web address]     ADMISSION INFORMATION  PRESENTATION DATE: 5/4/2025  7:51 PM  OBERVATION ADMISSION DATE: 05/04/2025 2340  INPATIENT ADMISSION DATE: 5/5/25  3:43 PM   DISCHARGE DATE: 5/9/2025  4:20 PM   DISPOSITION:Home/Self Care    Network Utilization Review Department  ATTENTION: Please call with any questions or concerns to [phone] and carefully listen to the prompts so that you are directed to the right person. All voicemails are confidential.   For Discharge needs, contact Care Management DC Support Team at [phone] opt. 2  Send all requests for admission clinical reviews, approved or denied determinations and any other requests to dedicated fax number below belonging to the campus where the patient is receiving treatment. List of dedicated fax numbers for the Facilities:  FACILITY NAME UR FAX NUMBER   ADMISSION DENIALS (Administrative/Medical Necessity) [phone]   DISCHARGE SUPPORT TEAM ([facility]) [phone]   PARENT CHILD HEALTH (Maternity/NICU/Pediatrics) [phone]   [facility] [phone]   [facility] [phone]   [facility] [phone]   [facility] [phone]   [facility] [phone]   [facility] [phone]   [facility] [phone]   [facility] [phone]   [facility]  [facility] [phone]   [facility] [phone]   [facility] [phone]   [facility] [phone]

## 2025-05-13 ENCOUNTER — APPOINTMENT (OUTPATIENT)
Dept: RADIOLOGY | Facility: HOSPITAL | Age: 65
DRG: 309 | End: 2025-05-13
Payer: COMMERCIAL

## 2025-05-13 ENCOUNTER — TRANSITIONAL CARE MANAGEMENT (OUTPATIENT)
Age: 65
End: 2025-05-13

## 2025-05-13 PROBLEM — R94.31 QT PROLONGATION: Status: ACTIVE | Noted: 2025-05-13

## 2025-05-13 LAB
2HR DELTA HS TROPONIN: -3 NG/L
ALBUMIN SERPL BCG-MCNC: 2.9 G/DL (ref 3.5–5)
ALP SERPL-CCNC: 118 U/L (ref 34–104)
ALT SERPL W P-5'-P-CCNC: 31 U/L (ref 7–52)
ANION GAP SERPL CALCULATED.3IONS-SCNC: 15 MMOL/L (ref 4–13)
AST SERPL W P-5'-P-CCNC: 46 U/L (ref 13–39)
BASOPHILS # BLD AUTO: 0.12 THOUSANDS/ÂΜL (ref 0–0.1)
BASOPHILS NFR BLD AUTO: 2 % (ref 0–1)
BILIRUB SERPL-MCNC: 0.62 MG/DL (ref 0.2–1)
BUN SERPL-MCNC: 8 MG/DL (ref 5–25)
CALCIUM ALBUM COR SERPL-MCNC: 7.9 MG/DL (ref 8.3–10.1)
CALCIUM SERPL-MCNC: 7 MG/DL (ref 8.4–10.2)
CARDIAC TROPONIN I PNL SERPL HS: 25 NG/L (ref ?–50)
CHLORIDE SERPL-SCNC: 103 MMOL/L (ref 96–108)
CO2 SERPL-SCNC: 22 MMOL/L (ref 21–32)
CREAT SERPL-MCNC: 0.7 MG/DL (ref 0.6–1.3)
EOSINOPHIL # BLD AUTO: 0.08 THOUSAND/ÂΜL (ref 0–0.61)
EOSINOPHIL NFR BLD AUTO: 2 % (ref 0–6)
ERYTHROCYTE [DISTWIDTH] IN BLOOD BY AUTOMATED COUNT: 19.9 % (ref 11.6–15.1)
ETHANOL SERPL-MCNC: 51 MG/DL
GFR SERPL CREATININE-BSD FRML MDRD: 100 ML/MIN/1.73SQ M
GLUCOSE P FAST SERPL-MCNC: 78 MG/DL (ref 65–99)
GLUCOSE SERPL-MCNC: 113 MG/DL (ref 65–140)
GLUCOSE SERPL-MCNC: 121 MG/DL (ref 65–140)
GLUCOSE SERPL-MCNC: 139 MG/DL (ref 65–140)
GLUCOSE SERPL-MCNC: 139 MG/DL (ref 65–140)
GLUCOSE SERPL-MCNC: 78 MG/DL (ref 65–140)
GLUCOSE SERPL-MCNC: 85 MG/DL (ref 65–140)
HCT VFR BLD AUTO: 32.3 % (ref 36.5–49.3)
HGB BLD-MCNC: 10.2 G/DL (ref 12–17)
IMM GRANULOCYTES # BLD AUTO: 0.04 THOUSAND/UL (ref 0–0.2)
IMM GRANULOCYTES NFR BLD AUTO: 1 % (ref 0–2)
INR PPP: 1 (ref 0.85–1.19)
LYMPHOCYTES # BLD AUTO: 1.08 THOUSANDS/ÂΜL (ref 0.6–4.47)
LYMPHOCYTES NFR BLD AUTO: 20 % (ref 14–44)
MCH RBC QN AUTO: 31.2 PG (ref 26.8–34.3)
MCHC RBC AUTO-ENTMCNC: 31.6 G/DL (ref 31.4–37.4)
MCV RBC AUTO: 99 FL (ref 82–98)
MONOCYTES # BLD AUTO: 1.1 THOUSAND/ÂΜL (ref 0.17–1.22)
MONOCYTES NFR BLD AUTO: 20 % (ref 4–12)
NEUTROPHILS # BLD AUTO: 3.07 THOUSANDS/ÂΜL (ref 1.85–7.62)
NEUTS SEG NFR BLD AUTO: 55 % (ref 43–75)
NRBC BLD AUTO-RTO: 0 /100 WBCS
PLATELET # BLD AUTO: 200 THOUSANDS/UL (ref 149–390)
PMV BLD AUTO: 9.9 FL (ref 8.9–12.7)
POTASSIUM SERPL-SCNC: 3.5 MMOL/L (ref 3.5–5.3)
PROT SERPL-MCNC: 6 G/DL (ref 6.4–8.4)
PROTHROMBIN TIME: 13.7 SECONDS (ref 12.3–15)
RBC # BLD AUTO: 3.27 MILLION/UL (ref 3.88–5.62)
SODIUM SERPL-SCNC: 140 MMOL/L (ref 135–147)
WBC # BLD AUTO: 5.49 THOUSAND/UL (ref 4.31–10.16)

## 2025-05-13 PROCEDURE — 96376 TX/PRO/DX INJ SAME DRUG ADON: CPT

## 2025-05-13 PROCEDURE — 84484 ASSAY OF TROPONIN QUANT: CPT | Performed by: STUDENT IN AN ORGANIZED HEALTH CARE EDUCATION/TRAINING PROGRAM

## 2025-05-13 PROCEDURE — 99223 1ST HOSP IP/OBS HIGH 75: CPT | Performed by: INTERNAL MEDICINE

## 2025-05-13 PROCEDURE — 72125 CT NECK SPINE W/O DYE: CPT

## 2025-05-13 PROCEDURE — 70450 CT HEAD/BRAIN W/O DYE: CPT

## 2025-05-13 PROCEDURE — 36415 COLL VENOUS BLD VENIPUNCTURE: CPT | Performed by: STUDENT IN AN ORGANIZED HEALTH CARE EDUCATION/TRAINING PROGRAM

## 2025-05-13 PROCEDURE — 80053 COMPREHEN METABOLIC PANEL: CPT

## 2025-05-13 PROCEDURE — 85025 COMPLETE CBC W/AUTO DIFF WBC: CPT

## 2025-05-13 PROCEDURE — 82948 REAGENT STRIP/BLOOD GLUCOSE: CPT

## 2025-05-13 PROCEDURE — 82077 ASSAY SPEC XCP UR&BREATH IA: CPT

## 2025-05-13 PROCEDURE — 96361 HYDRATE IV INFUSION ADD-ON: CPT

## 2025-05-13 PROCEDURE — 96367 TX/PROPH/DG ADDL SEQ IV INF: CPT

## 2025-05-13 PROCEDURE — 93005 ELECTROCARDIOGRAM TRACING: CPT

## 2025-05-13 PROCEDURE — 96375 TX/PRO/DX INJ NEW DRUG ADDON: CPT

## 2025-05-13 PROCEDURE — 96366 THER/PROPH/DIAG IV INF ADDON: CPT

## 2025-05-13 PROCEDURE — 85610 PROTHROMBIN TIME: CPT

## 2025-05-13 RX ORDER — LANOLIN ALCOHOL/MO/W.PET/CERES
800 CREAM (GRAM) TOPICAL 2 TIMES DAILY
Status: DISCONTINUED | OUTPATIENT
Start: 2025-05-13 | End: 2025-05-14 | Stop reason: HOSPADM

## 2025-05-13 RX ORDER — SACCHAROMYCES BOULARDII 250 MG
250 CAPSULE ORAL 2 TIMES DAILY
Status: DISCONTINUED | OUTPATIENT
Start: 2025-05-13 | End: 2025-05-14 | Stop reason: HOSPADM

## 2025-05-13 RX ORDER — ATORVASTATIN CALCIUM 40 MG/1
40 TABLET, FILM COATED ORAL
Status: DISCONTINUED | OUTPATIENT
Start: 2025-05-13 | End: 2025-05-14 | Stop reason: HOSPADM

## 2025-05-13 RX ORDER — FERROUS SULFATE 325(65) MG
325 TABLET ORAL
Status: DISCONTINUED | OUTPATIENT
Start: 2025-05-13 | End: 2025-05-14 | Stop reason: HOSPADM

## 2025-05-13 RX ORDER — KETOROLAC TROMETHAMINE 30 MG/ML
15 INJECTION, SOLUTION INTRAMUSCULAR; INTRAVENOUS ONCE
Status: COMPLETED | OUTPATIENT
Start: 2025-05-13 | End: 2025-05-13

## 2025-05-13 RX ORDER — LABETALOL HYDROCHLORIDE 5 MG/ML
10 INJECTION, SOLUTION INTRAVENOUS ONCE
Status: COMPLETED | OUTPATIENT
Start: 2025-05-13 | End: 2025-05-13

## 2025-05-13 RX ORDER — METOPROLOL TARTRATE 1 MG/ML
5 INJECTION, SOLUTION INTRAVENOUS ONCE
Status: COMPLETED | OUTPATIENT
Start: 2025-05-13 | End: 2025-05-13

## 2025-05-13 RX ORDER — TAMSULOSIN HYDROCHLORIDE 0.4 MG/1
0.4 CAPSULE ORAL DAILY
Status: DISCONTINUED | OUTPATIENT
Start: 2025-05-13 | End: 2025-05-14 | Stop reason: HOSPADM

## 2025-05-13 RX ORDER — PANTOPRAZOLE SODIUM 40 MG/1
40 TABLET, DELAYED RELEASE ORAL DAILY
Status: DISCONTINUED | OUTPATIENT
Start: 2025-05-13 | End: 2025-05-14 | Stop reason: HOSPADM

## 2025-05-13 RX ORDER — ASPIRIN 81 MG/1
81 TABLET, CHEWABLE ORAL DAILY
Status: DISCONTINUED | OUTPATIENT
Start: 2025-05-13 | End: 2025-05-14 | Stop reason: HOSPADM

## 2025-05-13 RX ORDER — INSULIN LISPRO 100 [IU]/ML
4-20 INJECTION, SOLUTION INTRAVENOUS; SUBCUTANEOUS
Status: DISCONTINUED | OUTPATIENT
Start: 2025-05-13 | End: 2025-05-14 | Stop reason: HOSPADM

## 2025-05-13 RX ORDER — FLUTICASONE FUROATE AND VILANTEROL 200; 25 UG/1; UG/1
1 POWDER RESPIRATORY (INHALATION) DAILY
Status: DISCONTINUED | OUTPATIENT
Start: 2025-05-13 | End: 2025-05-14 | Stop reason: HOSPADM

## 2025-05-13 RX ORDER — HYDRALAZINE HYDROCHLORIDE 20 MG/ML
5 INJECTION INTRAMUSCULAR; INTRAVENOUS ONCE
Status: DISCONTINUED | OUTPATIENT
Start: 2025-05-13 | End: 2025-05-13

## 2025-05-13 RX ORDER — FOLIC ACID 1 MG/1
1000 TABLET ORAL DAILY
Status: DISCONTINUED | OUTPATIENT
Start: 2025-05-13 | End: 2025-05-14 | Stop reason: HOSPADM

## 2025-05-13 RX ORDER — RANOLAZINE 500 MG/1
500 TABLET, EXTENDED RELEASE ORAL EVERY 12 HOURS
Status: DISCONTINUED | OUTPATIENT
Start: 2025-05-13 | End: 2025-05-14 | Stop reason: HOSPADM

## 2025-05-13 RX ORDER — LORAZEPAM 1 MG/1
2 TABLET ORAL ONCE
Status: COMPLETED | OUTPATIENT
Start: 2025-05-13 | End: 2025-05-13

## 2025-05-13 RX ORDER — MULTIVITAMIN WITH IRON
100 TABLET ORAL DAILY
Status: DISCONTINUED | OUTPATIENT
Start: 2025-05-13 | End: 2025-05-14 | Stop reason: HOSPADM

## 2025-05-13 RX ORDER — LIDOCAINE 50 MG/G
1 PATCH TOPICAL DAILY
Status: DISCONTINUED | OUTPATIENT
Start: 2025-05-13 | End: 2025-05-14 | Stop reason: HOSPADM

## 2025-05-13 RX ORDER — MAGNESIUM HYDROXIDE/ALUMINUM HYDROXICE/SIMETHICONE 120; 1200; 1200 MG/30ML; MG/30ML; MG/30ML
30 SUSPENSION ORAL EVERY 4 HOURS PRN
Status: DISCONTINUED | OUTPATIENT
Start: 2025-05-13 | End: 2025-05-14 | Stop reason: HOSPADM

## 2025-05-13 RX ORDER — GABAPENTIN 300 MG/1
300 CAPSULE ORAL 3 TIMES DAILY
Status: DISCONTINUED | OUTPATIENT
Start: 2025-05-13 | End: 2025-05-14 | Stop reason: HOSPADM

## 2025-05-13 RX ORDER — LORAZEPAM 2 MG/ML
0.5 INJECTION INTRAMUSCULAR ONCE
Status: DISCONTINUED | OUTPATIENT
Start: 2025-05-13 | End: 2025-05-13

## 2025-05-13 RX ORDER — INSULIN LISPRO 100 [IU]/ML
4-20 INJECTION, SOLUTION INTRAVENOUS; SUBCUTANEOUS
Status: DISCONTINUED | OUTPATIENT
Start: 2025-05-13 | End: 2025-05-13

## 2025-05-13 RX ORDER — ATORVASTATIN CALCIUM 40 MG/1
40 TABLET, FILM COATED ORAL DAILY
Status: DISCONTINUED | OUTPATIENT
Start: 2025-05-13 | End: 2025-05-13

## 2025-05-13 RX ORDER — METOPROLOL SUCCINATE 50 MG/1
150 TABLET, EXTENDED RELEASE ORAL EVERY 12 HOURS
Status: DISCONTINUED | OUTPATIENT
Start: 2025-05-13 | End: 2025-05-14 | Stop reason: HOSPADM

## 2025-05-13 RX ORDER — DIAZEPAM 10 MG/2ML
10 INJECTION, SOLUTION INTRAMUSCULAR; INTRAVENOUS ONCE
Status: COMPLETED | OUTPATIENT
Start: 2025-05-13 | End: 2025-05-13

## 2025-05-13 RX ORDER — NICOTINE 21 MG/24HR
1 PATCH, TRANSDERMAL 24 HOURS TRANSDERMAL DAILY
Status: DISCONTINUED | OUTPATIENT
Start: 2025-05-13 | End: 2025-05-14 | Stop reason: HOSPADM

## 2025-05-13 RX ORDER — ALBUTEROL SULFATE 90 UG/1
2 INHALANT RESPIRATORY (INHALATION) EVERY 4 HOURS PRN
Status: DISCONTINUED | OUTPATIENT
Start: 2025-05-13 | End: 2025-05-14 | Stop reason: HOSPADM

## 2025-05-13 RX ORDER — DILTIAZEM HYDROCHLORIDE 60 MG/1
120 CAPSULE, EXTENDED RELEASE ORAL EVERY 12 HOURS SCHEDULED
Status: DISCONTINUED | OUTPATIENT
Start: 2025-05-13 | End: 2025-05-14 | Stop reason: HOSPADM

## 2025-05-13 RX ORDER — AMOXICILLIN 250 MG
1 CAPSULE ORAL DAILY PRN
Status: DISCONTINUED | OUTPATIENT
Start: 2025-05-13 | End: 2025-05-14 | Stop reason: HOSPADM

## 2025-05-13 RX ORDER — ASPIRIN 81 MG/1
81 TABLET, CHEWABLE ORAL DAILY
Status: DISCONTINUED | OUTPATIENT
Start: 2025-05-13 | End: 2025-05-13

## 2025-05-13 RX ORDER — MAGNESIUM HYDROXIDE/ALUMINUM HYDROXICE/SIMETHICONE 120; 1200; 1200 MG/30ML; MG/30ML; MG/30ML
30 SUSPENSION ORAL
Status: DISCONTINUED | OUTPATIENT
Start: 2025-05-13 | End: 2025-05-14 | Stop reason: HOSPADM

## 2025-05-13 RX ORDER — DEXTROSE MONOHYDRATE AND SODIUM CHLORIDE 5; .45 G/100ML; G/100ML
75 INJECTION, SOLUTION INTRAVENOUS CONTINUOUS
Status: DISCONTINUED | OUTPATIENT
Start: 2025-05-13 | End: 2025-05-13

## 2025-05-13 RX ORDER — SODIUM CHLORIDE 9 MG/ML
125 INJECTION, SOLUTION INTRAVENOUS CONTINUOUS
Status: DISCONTINUED | OUTPATIENT
Start: 2025-05-13 | End: 2025-05-13

## 2025-05-13 RX ORDER — LANOLIN ALCOHOL/MO/W.PET/CERES
100 CREAM (GRAM) TOPICAL DAILY
Status: DISCONTINUED | OUTPATIENT
Start: 2025-05-13 | End: 2025-05-14 | Stop reason: HOSPADM

## 2025-05-13 RX ORDER — ALBUTEROL SULFATE 90 UG/1
2 INHALANT RESPIRATORY (INHALATION) EVERY 4 HOURS PRN
Status: DISCONTINUED | OUTPATIENT
Start: 2025-05-13 | End: 2025-05-13

## 2025-05-13 RX ADMIN — GABAPENTIN 300 MG: 300 CAPSULE ORAL at 20:03

## 2025-05-13 RX ADMIN — ATORVASTATIN CALCIUM 40 MG: 40 TABLET, FILM COATED ORAL at 16:38

## 2025-05-13 RX ADMIN — DILTIAZEM HYDROCHLORIDE 120 MG: 60 CAPSULE, EXTENDED RELEASE ORAL at 11:42

## 2025-05-13 RX ADMIN — GABAPENTIN 300 MG: 300 CAPSULE ORAL at 16:38

## 2025-05-13 RX ADMIN — FOLIC ACID 1000 MCG: 1 TABLET ORAL at 11:39

## 2025-05-13 RX ADMIN — GABAPENTIN 300 MG: 300 CAPSULE ORAL at 11:39

## 2025-05-13 RX ADMIN — ALUMINUM HYDROXIDE, MAGNESIUM HYDROXIDE, AND DIMETHICONE 30 ML: 200; 20; 200 SUSPENSION ORAL at 21:46

## 2025-05-13 RX ADMIN — ALUMINUM HYDROXIDE, MAGNESIUM HYDROXIDE, AND DIMETHICONE 30 ML: 200; 20; 200 SUSPENSION ORAL at 11:40

## 2025-05-13 RX ADMIN — Medication 250 MG: at 11:39

## 2025-05-13 RX ADMIN — FOLIC ACID 1 MG: 5 INJECTION, SOLUTION INTRAMUSCULAR; INTRAVENOUS; SUBCUTANEOUS at 00:05

## 2025-05-13 RX ADMIN — DEXTROSE AND SODIUM CHLORIDE 75 ML/HR: 5; .45 INJECTION, SOLUTION INTRAVENOUS at 09:47

## 2025-05-13 RX ADMIN — DILTIAZEM HYDROCHLORIDE 120 MG: 60 CAPSULE, EXTENDED RELEASE ORAL at 20:02

## 2025-05-13 RX ADMIN — Medication 800 MG: at 17:07

## 2025-05-13 RX ADMIN — METOPROLOL TARTRATE 5 MG: 5 INJECTION INTRAVENOUS at 06:12

## 2025-05-13 RX ADMIN — FLUTICASONE FUROATE AND VILANTEROL TRIFENATATE 1 PUFF: 200; 25 POWDER RESPIRATORY (INHALATION) at 11:44

## 2025-05-13 RX ADMIN — KETOROLAC TROMETHAMINE 15 MG: 30 INJECTION, SOLUTION INTRAMUSCULAR; INTRAVENOUS at 17:08

## 2025-05-13 RX ADMIN — DIAZEPAM 10 MG: 10 INJECTION, SOLUTION INTRAMUSCULAR; INTRAVENOUS at 06:12

## 2025-05-13 RX ADMIN — ALUMINUM HYDROXIDE, MAGNESIUM HYDROXIDE, AND DIMETHICONE 30 ML: 200; 20; 200 SUSPENSION ORAL at 16:38

## 2025-05-13 RX ADMIN — THIAMINE HCL TAB 100 MG 100 MG: 100 TAB at 11:40

## 2025-05-13 RX ADMIN — TAMSULOSIN HYDROCHLORIDE 0.4 MG: 0.4 CAPSULE ORAL at 11:39

## 2025-05-13 RX ADMIN — METOPROLOL SUCCINATE 150 MG: 50 TABLET, EXTENDED RELEASE ORAL at 11:45

## 2025-05-13 RX ADMIN — RANOLAZINE 500 MG: 500 TABLET, FILM COATED, EXTENDED RELEASE ORAL at 11:39

## 2025-05-13 RX ADMIN — METOPROLOL TARTRATE 5 MG: 5 INJECTION INTRAVENOUS at 02:59

## 2025-05-13 RX ADMIN — LABETALOL HYDROCHLORIDE 10 MG: 5 INJECTION, SOLUTION INTRAVENOUS at 18:14

## 2025-05-13 RX ADMIN — RANOLAZINE 500 MG: 500 TABLET, FILM COATED, EXTENDED RELEASE ORAL at 21:43

## 2025-05-13 RX ADMIN — LORAZEPAM 2 MG: 1 TABLET ORAL at 17:07

## 2025-05-13 RX ADMIN — Medication 250 MG: at 17:07

## 2025-05-13 RX ADMIN — Medication 800 MG: at 11:40

## 2025-05-13 RX ADMIN — PANTOPRAZOLE SODIUM 40 MG: 40 TABLET, DELAYED RELEASE ORAL at 11:39

## 2025-05-13 RX ADMIN — FERROUS SULFATE TAB 325 MG (65 MG ELEMENTAL FE) 325 MG: 325 (65 FE) TAB at 11:51

## 2025-05-13 RX ADMIN — NICOTINE 1 PATCH: 21 PATCH, EXTENDED RELEASE TRANSDERMAL at 11:39

## 2025-05-13 RX ADMIN — Medication 100 MG: at 11:43

## 2025-05-13 RX ADMIN — SODIUM CHLORIDE 1000 ML: 0.9 INJECTION, SOLUTION INTRAVENOUS at 03:00

## 2025-05-13 RX ADMIN — APIXABAN 5 MG: 5 TABLET, FILM COATED ORAL at 17:07

## 2025-05-13 RX ADMIN — LORAZEPAM 2 MG: 1 TABLET ORAL at 11:37

## 2025-05-13 RX ADMIN — METOPROLOL SUCCINATE 150 MG: 50 TABLET, EXTENDED RELEASE ORAL at 21:42

## 2025-05-13 NOTE — UTILIZATION REVIEW
Initial Clinical Review    Observation 5/13/25 @ 0719 converted to inpatient admission @ 1154 for continued care & tx for alcohol withdrawal.    Care started in ER 5/12/25 @ 9:17pm    Admission: Date/Time/Statement:   Admission Orders (From admission, onward)       Ordered        05/13/25 1154  INPATIENT ADMISSION  Once            05/13/25 0719  Place in Observation  Once                          Orders Placed This Encounter   Procedures    INPATIENT ADMISSION     Standing Status:   Standing     Number of Occurrences:   1     Level of Care:   Med Surg [16]     Estimated length of stay:   More than 2 Midnights     Certification:   I certify that inpatient services are medically necessary for this patient for a duration of greater than two midnights. See H&P and MD Progress Notes for additional information about the patient's course of treatment.     ED Arrival Information       Expected   -    Arrival   5/12/2025 21:17    Acuity   Urgent              Means of arrival   Ambulance    Escorted by   [facility]   Hospitalist    Admission type   Emergency              Arrival complaint   Weakness             Chief Complaint   Patient presents with    Alcohol Intoxication     Pt arrived after neighbor called 911 due to patient being intoxicated and screaming for an ambulance. Once BLS arrived pt c/o weakness and drinking liter of vodka.       Initial Presentation: to ER 5/12/25 @ 9:17pm  65 yom to ER from home via EMS for generalized weakness & alcohol intoxication. Admits to drinking today. Pt found drinking ltr vodka. Hx afib & ETOH abuse. Presents tachycardic, intoxicated, CIWA score 8 for n&v, tremors, anxiety, headache. Admission CT head, CT c-soine neg. Labs: Hgb 10.3, anion gap 14, Ca 7.2.  Placed in observation status 5/13/25 for alcohol withdrawal. Plan: IVF, telemetry CIWA protocol, accuchecks aspiration precautions, seizure precautions, PT.   Converted to inpatient admission 5/13/25 @ 1154 for  continued care & tx for withdrawal. CIWA score to 9 for ongoing tremors, anxiety, headache, agitation.     ED Treatment-Medication Administration from 05/12/2025 2117 to 05/13/2025 0806         Date/Time Order Dose Route Action     05/12/2025 2200 dextrose 5 % and sodium chloride 0.9 % bolus 1,000 mL 1,000 mL Intravenous New Bag     05/12/2025 2215 thiamine (VITAMIN B1) 100 mg in sodium chloride 0.9 % 50 mL IVPB 100 mg Intravenous New Bag     05/13/2025 0005 folic acid 1 mg in sodium chloride 0.9 % 50 mL IVPB 1 mg Intravenous New Bag     05/13/2025 0300 sodium chloride 0.9 % bolus 1,000 mL 1,000 mL Intravenous New Bag     05/13/2025 0259 metoprolol (LOPRESSOR) injection 5 mg 5 mg Intravenous Given     05/13/2025 0612 metoprolol (LOPRESSOR) injection 5 mg 5 mg Intravenous Given     05/13/2025 0612 diazepam (VALIUM) injection 10 mg 10 mg Intravenous Given            Scheduled Medications:  aluminum-magnesium hydroxide-simethicone, 30 mL, Oral, 4x Daily (AC & HS)  [Held by provider] apixaban, 5 mg, Oral, BID  [Held by provider] aspirin, 81 mg, Oral, Daily  atorvastatin, 40 mg, Oral, Daily With Dinner  diltiazem, 120 mg, Oral, Q12H SEDA  ferrous sulfate, 325 mg, Oral, Daily With Breakfast  fluticasone-vilanterol, 1 puff, Inhalation, Daily  folic acid, 1,000 mcg, Oral, Daily  gabapentin, 300 mg, Oral, TID  insulin lispro, 4-20 Units, Subcutaneous, 4x Daily (AC & HS)  lidocaine, 1 patch, Topical, Daily  magnesium Oxide, 800 mg, Oral, BID  metoprolol succinate, 150 mg, Oral, Q12H  nicotine, 1 patch, Transdermal, Daily  pantoprazole, 40 mg, Oral, Daily  pyridoxine, 100 mg, Oral, Daily  ranolazine, 500 mg, Oral, Q12H  saccharomyces boulardii, 250 mg, Oral, BID  tamsulosin, 0.4 mg, Oral, Daily  vitamin B-1, 100 mg, Oral, Daily      Continuous IV Infusions:  dextrose 5 % and sodium chloride 0.45 %, 75 mL/hr, Intravenous, Continuous      PRN Meds:  albuterol, 2 puff, Inhalation, Q4H PRN  aluminum-magnesium hydroxide-simethicone,  30 mL, Oral, Q4H PRN  [Held by provider] senna-docusate sodium, 1 tablet, Oral, Daily PRN      ED Triage Vitals   Temperature Pulse Respirations Blood Pressure SpO2 Pain Score   05/13/25 0630 05/12/25 2215 05/12/25 2215 05/12/25 2215 05/12/25 2215 05/13/25 0812   98.2 °F (36.8 °C) (!) 114 16 129/72 92 % No Pain     Weight (last 2 days)       Date/Time Weight    05/13/25 0812 79.1 (174.3)            Vital Signs (last 3 days)       Date/Time Temp Pulse Resp BP MAP (mmHg) SpO2 Calculated FIO2 (%) - Nasal Cannula Nasal Cannula O2 Flow Rate (L/min) O2 Device Patient Position - Orthostatic VS [place] Coma Scale Score CIWA-Ar Total Pain    05/13/25 11:22:53 99.3 °F (37.4 °C) 87 -- 163/95 118 97 % -- -- -- -- -- 9 --    05/13/25 0812 97.4 °F (36.3 °C) 100 20 166/95 119 99 % 32 3 L/min Nasal cannula Lying -- -- No Pain    05/13/25 08:11:08 97.4 °F (36.3 °C) 101 -- 166/95 119 98 % -- -- -- -- -- -- --    05/13/25 08:09:55 97.4 °F (36.3 °C) 100 -- 166/95 119 98 % -- -- -- -- -- -- --    05/13/25 0745 -- 106 16 157/79 113 99 % -- -- None (Room air) Lying -- -- --    05/13/25 0730 -- 106 20 143/74 104 98 % -- -- None (Room air) Lying -- -- --    05/13/25 0715 -- 103 17 156/76 108 99 % -- -- None (Room air) Lying -- -- --    05/13/25 0645 -- 104 25 159/91 117 95 % -- -- None (Room air) -- -- -- --    05/13/25 0630 98.2 °F (36.8 °C) 108 18 148/96 116 95 % -- -- None (Room air) -- -- -- --    05/13/25 0600 -- 128 16 183/111 138 92 % -- -- None (Room air) -- -- -- --    05/13/25 0545 -- 139 23 191/97 136 -- -- -- -- -- -- 8 --    05/13/25 0500 -- 113 17 171/99 127 93 % -- -- None (Room air) -- -- -- --    05/13/25 0315 -- 98 14 151/88 113 91 % -- -- None (Room air) -- -- -- --    05/13/25 0300 -- 122 17 181/102 135 93 % -- -- None (Room air) -- -- -- --    05/13/25 0200 -- 106 15 162/81 116 92 % -- -- -- -- -- -- --    05/13/25 0130 -- 94 20 127/71 92 92 % -- -- -- -- -- -- --    05/13/25 0100 -- 96 21 142/75 102 94 % -- -- None  (Room air) -- -- -- --    05/13/25 0051 -- -- -- -- -- -- -- -- -- -- 15 -- --    05/13/25 0000 -- 102 23 117/62 82 92 % -- -- None (Room air) -- -- -- --    05/12/25 2300 -- 107 21 128/60 86 91 % -- -- -- -- -- -- --    05/12/25 2230 -- 105 20 124/68 90 94 % -- -- None (Room air) Sitting -- -- --    05/12/25 2215 -- 114 16 129/72 95 92 % -- -- None (Room air) Lying -- -- --           CIWA-Ar Score       Row Name 05/13/25 11:22:53 05/13/25 0545          CIWA-Ar    Nausea and Vomiting 0 1     Tactile Disturbances 0 0     Tremor 3 2     Auditory Disturbances 0 0     Paroxysmal Sweats 0 0     Visual Disturbances 0 0     Anxiety 2 3     Headache, Fullness in Head 1 2     Agitation 3 0     Orientation and Clouding of Sensorium 0 0     CIWA-Ar Total 9 8                     Pertinent Labs/Diagnostic Test Results:   Radiology:  CT head wo contrast   Final Interpretation by Chris Lugo MD (05/13 1109)      No interval change. No acute intracranial hemorrhage or depressed calvarial fracture identified                  Workstation performed: [id number]         CT spine cervical wo contrast   Final Interpretation by Chris Lugo MD (05/13 1116)      No interval change. No acute fracture or evidence for traumatic malalignment.                  Workstation performed: [id number]           Cardiology:  ECG 12 lead    by Interface, Ris Results In (05/13 0252)      ECG 12 lead    by Interface, Ris Results In (05/13 0047)      ECG 12 lead    by Interface, Ris Results In (05/12 2125)        GI:  No orders to display           Results from last 7 days   Lab Units 05/13/25  1102 05/12/25  2204   WBC Thousand/uL 5.49 4.82   HEMOGLOBIN g/dL 10.2* 10.3*   HEMATOCRIT % 32.3* 31.8*   PLATELETS Thousands/uL 200 207   TOTAL NEUT ABS Thousands/µL 3.07 2.43         Results from last 7 days   Lab Units 05/13/25  1102 05/12/25  2204   SODIUM mmol/L 140 140   POTASSIUM mmol/L 3.5 4.8   CHLORIDE mmol/L 103 102   CO2 mmol/L 22 24   ANION GAP mmol/L 15* 14*    BUN mg/dL 8 9   CREATININE mg/dL 0.70 0.80   EGFR ml/min/1.73sq m 100 95   CALCIUM mg/dL 7.0* 7.2*     Results from last 7 days   Lab Units 05/13/25  1102   AST U/L 46*   ALT U/L 31   ALK PHOS U/L 118*   TOTAL PROTEIN g/dL 6.0*   ALBUMIN g/dL 2.9*   TOTAL BILIRUBIN mg/dL 0.62     Results from last 7 days   Lab Units 05/13/25  1051   POC GLUCOSE mg/dl 85     Results from last 7 days   Lab Units 05/13/25  1102 05/12/25  2204   GLUCOSE RANDOM mg/dL 78 80             BETA-HYDROXYBUTYRATE   Date Value Ref Range Status   01/22/2024 3.2 (H) <0.6 mmol/L Final                      Results from last 7 days   Lab Units 05/13/25  0005 05/12/25  2212   HS TNI 0HR ng/L  --  28   HS TNI 2HR ng/L 25  --    HSTNI D2 ng/L -3  --          Results from last 7 days   Lab Units 05/13/25  1102   PROTIME seconds 13.7   INR  1.00                                                                                                               Past Medical History:   Diagnosis Date    Acute on chronic kidney failure  (HCC)     Alcohol abuse     Alcohol withdrawal ([facility]) 06/07/2019    Atrial fibrillation (HCC)     Cancer (HCC)     prostate ca,had radiation    Cardiac disease     stents,then triple bypass    COPD (chronic obstructive pulmonary disease) (HCC)     Coronary artery disease     ETOH abuse     Heart failure (HCC)     History of heart surgery     [facility] bypass [facility]    Hx of heart artery stent     2014    Hyperlipidemia     Hypertension     Hyponatremia 10/17/2019    Hypovolemic shock ([facility]) 12/22/2019    Lumbar spondylitis ([facility]) 10/13/2022    Metabolic acidosis, increased anion gap 04/21/2021    Nasal bone fracture 10/10/2022    Prostate CA (HCC)     S/P CABG x 3     2004    Sleep apnea      Present on Admission:   CAD (coronary artery disease)   Nicotine abuse   Dyslipidemia   Atrial fibrillation with rapid ventricular response (HCC)   COPD (chronic obstructive pulmonary disease) ([facility])   Type 2 diabetes mellitus with  diabetic chronic kidney disease (HCC)   Alcohol withdrawal (HCC)      Admitting Diagnosis: Dehydration [E86.0]  Alcohol intoxication (HCC) [F10.929]  Atrial fibrillation, rapid (HCC) [I48.91]  Medication nonadherence due to psychosocial problem [Z91.148, Z65.9]  Age/Sex: 65 y.o. male    Network Utilization Review Department  ATTENTION: Please call with any questions or concerns to [phone] and carefully listen to the prompts so that you are directed to the right person. All voicemails are confidential.   For Discharge needs, contact Care Management DC Support Team at [phone] opt. [phone]  Send all requests for admission clinical reviews, approved or denied determinations and any other requests to dedicated fax number below belonging to the campus where the patient is receiving treatment. List of dedicated fax numbers for the Facilities:  FACILITY NAME UR FAX NUMBER   ADMISSION DENIALS (Administrative/Medical Necessity) [phone]   DISCHARGE SUPPORT TEAM (NETWORK) [phone]   PARENT CHILD HEALTH (Maternity/NICU/Pediatrics) [phone]   [facility] [phone]   [facility] [phone]   [facility] [phone]   [facility] [phone]   [facility] [phone]   [facility] [phone]   [facility] [phone]   [facility] [phone]   [facility] [phone]   [facility] [phone]   [facility] [phone]   [facility] [phone]

## 2025-05-13 NOTE — H&P
H&P - Family Medicine   Name: Gregg Partida 65 y.o. male I MRN: [id number]  Unit/Bed#: [address] [place] [address] I Date of Admission: 5/12/2025   Date of Service: 5/13/2025 I Hospital Day: 0     Assessment & Plan  Alcohol withdrawal (HCC)  Pt arrived after neighbor called 911 due to patient being intoxicated and screaming for an ambulance. Once BLS arrived pt c/o weakness and drinking liter of vodka.   Patient is not sure the last time he took his medications and the last time he ate    ED Course: 1L NS, 5 mg Lopressor inj. X 2, 1mg IV Folic acid, 10 mg Valium inj., 100 mg IV Thiamine, 1L D5NS bolus    Ethanol level: 51    Plan  Continue home Folate, Thiamine  CIWA protocol  Fall precaution   Monitor mental status   Seizure precautions   75 mL/hr IV D5NS continuous  Aspiration precautions  Atrial fibrillation with rapid ventricular response (HCC)  Patient presented to the ED after being intoxicated and was found to be in Afib w/ RVR.  Received 5 mg IV Lopressor added to NSTEMI in the ED  ED Course: Valium 10 mg IV, NS bolus 1000 ml, dextrose 5% and NS bolus 1000 ml, thiamine 100 mg IV     Plan:   Continue home Eliquis 5 mg BID  Continue home 150 mg Metoprolol Q12  Continue home 120 mg Cardizem Q12  Monitor telemetry  Continue with IV fluids  Continue to monitor  Fall  Patient has a history of fall, usually after drinking.   Patient fell yesterday after drinking shots of vodka. Endorses hitting his head after fall without LOC.  Denies headache, dizziness, lightheadedness.    CT head: No acute intracranial hemorrhage or depressed calvarial fracture identified   CT C spine: No acute fracture or evidence for traumatic malalignment.     Fall precautions   PT/OT  Continue to monitor   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 disease (HCC)  Lab Results   Component Value Date    HGBA1C 5.4 02/15/2025       Recent Labs     05/13/25  1051 05/13/25  1210   POCGLU 85 121       Blood Sugar Average: Last 72 hrs:  (P)  103  Well-controlled. Patient is on metformin 500 mg daily at home    Plan  Held home metformin  Patient started on insulin sliding scale and Accu-Cheks ACHS  Carbohydrate controlled diet  Hypoglycemia protocol  Monitor blood sugars  Dyslipidemia  Chronic.  Last lipid panel showed total cholesterol of 153, triglycerides of 101, HDL of 46 and LDL of 87    Continue with home Lipitor  History of prostate cancer  Chronic, stable, PMH of prostate CA (2020), s/p resection 2021. Home medication flomax 0.4 mg daily      CAD (coronary artery disease)  Patient has a h/o CAD s/p CABG. Patient is home on Aspirin 81 mg EC tablet daily, Ranolazine 500 mg Q12, and Eliquis 5 mg BID.     Continue home Aspirin 81mg   Continue home Eliquis   Continue home Ranolazine  Nicotine abuse  Chronic stable smoked 1.5 packs x day     Nicotine 21 mg / 24 hour patch  Smoking cessation encouraged   COPD (chronic obstructive pulmonary disease) (HCC)  Chronic stable. Not in acute exacerbation. Home on Albuterol inhaler and Breo Ellipta inhaler.     Continue home inhalers  Monitor oxygen saturation  Monitor respiratory status  QT prolongation  QTC interval of 463 on EKG  EKG: Afib with RVR    Plan:   Avoid QTC prolonging medications  Continue to monitor          VTE Pharmacologic Prophylaxis: VTE Score: 6 High Risk (Score >/= 5) - Pharmacological DVT Prophylaxis Ordered: apixaban (Eliquis). Sequential Compression Devices Ordered.  Code Status: Level 1 - Full Code   Discussion with family: Patient declined call to .     Anticipated Length of Stay: Patient will be admitted on an observation basis with an anticipated length of stay of less than 2 midnights secondary to alcohol intoxication and Afib w/ RVR.    History of Present Illness   Chief Complaint: alcohol intoxication and fall    Gregg Partida is a 65 y.o. male with a PMH of alcohol abuse, Afib on AC, COPD, HLD, HTN, CAD s/p CABG x 3, Prostate CA, combined CHF, who presents with  complaints of a fall once yesterday after drinking vodka. Patient states he doesn't know the last time he had a meal nor the last time he took his medications.   Patient admits diarrhea (multiple episodes), fall, drinks every day      Review of Systems   Constitutional:  Negative for chills and fever.   HENT:  Negative for ear pain.    Eyes:  Negative for photophobia and pain.   Respiratory:  Negative for cough and shortness of breath.    Cardiovascular:  Negative for chest pain.   Gastrointestinal:  Positive for diarrhea. Negative for abdominal pain, constipation, nausea and vomiting.   Genitourinary:  Negative for difficulty urinating.   Musculoskeletal:  Negative for back pain and neck pain.   Neurological:  Positive for tremors. Negative for dizziness, light-headedness and headaches.   Psychiatric/Behavioral:  Negative for confusion and hallucinations.        Historical Information   Past Medical History:   Diagnosis Date    Acute on chronic kidney failure  ([facility])     Alcohol abuse     Alcohol withdrawal ([facility]) 06/07/2019    Atrial fibrillation ([facility])     Cancer ([facility])     prostate ca,had radiation    Cardiac disease     stents,then triple bypass    COPD (chronic obstructive pulmonary disease) ([facility])     Coronary artery disease     ETOH abuse     Heart failure ([facility])     History of heart surgery     [facility]    Hx of heart artery stent     2014    Hyperlipidemia     Hypertension     Hyponatremia 10/17/2019    Hypovolemic shock ([facility]) 12/22/2019    Lumbar spondylitis ([facility]) 10/13/2022    Metabolic acidosis, increased anion gap 04/21/2021    Nasal bone fracture 10/10/2022    Prostate CA ([facility])     S/P CABG x 3     2004    Sleep apnea      Past Surgical History:   Procedure Laterality Date    CARDIAC CATHETERIZATION      2 stents    CORONARY ARTERY BYPASS GRAFT      CORONARY ARTERY BYPASS GRAFT  2004    AR ARTHRD ANT INTERBODY MIN DSC CRV BELOW C2 N/A 12/16/2020    Procedure: Anterior cervical  "discectomy with fusion C4-C7; Posterior cervical decompression and fusion C2-T2;  Surgeon: David Rowell MD;  Location: [facility];  Service: Neurosurgery    TONSILLECTOMY       Social History     Tobacco Use    Smoking status: Every Day     Current packs/day: 1.50     Average packs/day: 1.5 packs/day for 40.0 years (60.0 ttl pk-yrs)     Types: Cigarettes    Smokeless tobacco: Never   Vaping Use    Vaping status: Never Used   Substance and Sexual Activity    Alcohol use: Yes     Alcohol/week: 4.0 standard drinks of alcohol     Types: 4 Standard drinks or equivalent per week     Comment: \"quart a day\"    Drug use: No    Sexual activity: Not Currently     E-Cigarette/Vaping    E-Cigarette Use Never User      E-Cigarette/Vaping Substances    Nicotine Yes     THC No     CBD No     Flavoring No     Other No     Unknown No      Family History   Problem Relation Age of Onset    Diabetes Mother     Uterine cancer Mother     COPD Father     Hypertension Father      Social History:  Marital Status: Single   Occupation: Unemployed  Patient Pre-hospital Living Situation: Home  Patient Pre-hospital Level of Mobility: walks  Patient Pre-hospital Diet Restrictions: None    Meds/Allergies   I have been unable to obtain / verify an up to date medication list despite all reasonable attempts.  Prior to Admission medications    Medication Sig Start Date End Date Taking? Authorizing Provider   albuterol (PROVENTIL HFA,VENTOLIN HFA) 90 mcg/act inhaler Inhale 2 puffs every 4 (four) hours as needed for wheezing    Historical Provider, MD   albuterol (Ventolin HFA) 90 mcg/act inhaler Inhale 2 puffs every 4 (four) hours as needed for wheezing 4/16/25   Rich Finn MD   aluminum-magnesium hydroxide-simethicone (MAALOX) 8669-0519-267 mg/30 mL suspension Take 30 mL by mouth every 4 (four) hours as needed for indigestion or heartburn 4/16/25   Rich Finn MD   aluminum-magnesium hydroxide-simethicone (MAALOX) 200-200-20 MG/5ML SUSP " Take 30 mL by mouth 4 (four) times a day (before meals and at bedtime)    Historical Provider, MD   apixaban (Eliquis) 5 mg Take 1 tablet (5 mg total) by mouth 2 (two) times a day 4/16/25   Rich Finn MD   apixaban (ELIQUIS) 5 mg Take 5 mg by mouth 2 (two) times a day    Historical Provider, MD   aspirin 81 mg chewable tablet Chew 81 mg daily    Historical Provider, MD   aspirin 81 mg EC tablet Take 1 tablet (81 mg total) by mouth daily 4/16/25   Rich Finn MD   atorvastatin (LIPITOR) 40 mg tablet Take 1 tablet (40 mg total) by mouth daily 4/16/25   Rich Finn MD   atorvastatin (LIPITOR) 40 mg tablet Take 40 mg by mouth daily    Historical Provider, MD   Blood Glucose Monitoring Suppl (ONE TOUCH ULTRA MINI) w/Device KIT Use as directed 5/16/19   Dinora Wick MD   Blood Pressure Monitoring (Adult Blood Pressure Cuff Lg) KIT Use in the morning 12/14/23   Elliott Zimmerman MD   Breo Ellipta 200-25 MCG/ACT inhaler Inhale 1 puff daily Rinse mouth after use. 4/16/25   Rich Finn MD   diltiazem (CARDIZEM SR) 120 mg 12 hr capsule Take 1 capsule (120 mg total) by mouth every 12 (twelve) hours 4/16/25   Rich Finn MD   diltiazem (CARDIZEM SR) 120 mg 12 hr capsule Take 120 mg by mouth 2 (two) times a day    Historical Provider, MD   ferrous sulfate 325 (65 Fe) mg tablet Take 1 tablet (325 mg total) by mouth daily with breakfast 4/16/25   Rich Finn MD   ferrous sulfate 325 (65 Fe) mg tablet Take 325 mg by mouth daily with breakfast    Dinora Provider, MD   fluticasone-vilanterol (Breo Ellipta) 200-25 mcg/actuation inhaler Inhale 1 puff daily Rinse mouth after use.    Historical Provider, MD   folic acid (FOLVITE) 1 mg tablet Take 1 tablet (1,000 mcg total) by mouth daily 4/16/25   Rich Finn MD   folic acid (FOLVITE) 1 mg tablet Take by mouth daily    Historical Provider, MD   gabapentin (NEURONTIN) 300 mg capsule Take 1 capsule (300 mg total) by mouth 3 (three)  times a day 4/16/25   Rich Finn MD   gabapentin (NEURONTIN) 300 mg capsule Take 300 mg by mouth 3 (three) times a day    Historical Provider, MD   lidocaine (Lidoderm) 5 % Apply 1 patch topically over 12 hours daily Remove & Discard patch within 12 hours or as directed by MD 4/16/25   Rich Finn MD   lidocaine (LIDODERM) 5 % Apply 1 patch topically daily Remove & Discard patch within 12 hours or as directed by MD    Historical Provider, MD   magnesium Oxide (MAG-OX) 400 mg TABS Take 2 tablets (800 mg total) by mouth 2 (two) times a day 4/16/25   Rich Finn MD   magnesium oxide-pyridoxine (BEELITH) 362-20 MG TABS Take 1 tablet by mouth daily    Historical Provider, MD   metFORMIN (GLUCOPHAGE) 500 mg tablet Take 1 tablet (500 mg total) by mouth daily with breakfast 4/16/25   Rich Finn MD   metFORMIN (GLUCOPHAGE) 500 mg tablet Take 500 mg by mouth daily with breakfast    Historical Provider, MD   metoprolol succinate (TOPROL-XL) 100 mg 24 hr tablet Take 150 mg by mouth every 12 (twelve) hours    Historical Provider, MD   metoprolol tartrate (LOPRESSOR) 100 mg tablet Take 1.5 tablets (150 mg total) by mouth every 12 (twelve) hours 4/16/25   Rich Finn MD   naltrexone (REVIA) 50 mg tablet Take 50 mg by mouth daily 2/16/25 8/15/25  Historical Provider, MD   naltrexone (REVIA) 50 mg tablet Take 50 mg by mouth daily    Historical Provider, MD   nicotine (NICODERM CQ) 21 mg/24 hr TD 24 hr patch Place 1 patch on the skin over 24 hours daily 4/16/25   Rich Finn MD   nicotine (NICODERM CQ) 21 mg/24 hr TD 24 hr patch Place 1 patch on the skin every 24 hours    Historical Provider, MD OLIVE CRAWLEY LANCETS FINE MISC 3 (three) times a day Test 5/16/19   Historical Provider, MD   pantoprazole (PROTONIX) 40 mg tablet Take 1 tablet (40 mg total) by mouth daily 4/16/25   Rich Finn MD   pantoprazole (PROTONIX) 40 mg tablet Take 40 mg by mouth daily    Historical Provider, MD    pyridoxine (VITAMIN B6) 100 mg tablet Take 100 mg by mouth daily    Historical Provider, MD   ranolazine (RANEXA) 500 mg 12 hr tablet Take 1 tablet (500 mg total) by mouth every 12 (twelve) hours 4/16/25   Rich Finn MD   ranolazine (RANEXA) 500 mg 12 hr tablet Take 500 mg by mouth 2 (two) times a day    Historical Provider, MD   saccharomyces boulardii (FLORASTOR) 250 mg capsule Take 1 capsule (250 mg total) by mouth 2 (two) times a day 4/16/25   Rich Finn MD   saccharomyces boulardii (FLORASTOR) 250 mg capsule Take 250 mg by mouth 2 (two) times a day    Historical Provider, MD   senna-docusate sodium (SENOKOT S) 8.6-50 mg per tablet Take 1 tablet by mouth daily as needed for constipation 4/16/25   Rich Finn MD   senna-docusate sodium (SENOKOT-S) 8.6-50 mg per tablet Take 1 tablet by mouth daily    Historical Provider, MD   tamsulosin (FLOMAX) 0.4 mg Take 1 capsule (0.4 mg total) by mouth daily 4/16/25   Rich Finn MD   tamsulosin (FLOMAX) 0.4 mg Take 0.4 mg by mouth daily with dinner    Historical Provider, MD   Thiamine HCl (vitamin B-1) 100 MG TABS Take 1 tablet (100 mg total) by mouth daily 4/16/25   Rich Finn MD     No Known Allergies    Objective :  Temp:  [97.4 °F (36.3 °C)-99.3 °F (37.4 °C)] 99.3 °F (37.4 °C)  HR:  [] 99  BP: (117-191)/() 163/95  Resp:  [14-25] 20  SpO2:  [91 %-99 %] 97 %  O2 Device: Nasal cannula  Nasal Cannula O2 Flow Rate (L/min):  [3 L/min] 3 L/min    Physical Exam  Constitutional:       General: He is not in acute distress.     Appearance: Normal appearance. He is normal weight. He is not ill-appearing or toxic-appearing.   HENT:      Head: Normocephalic and atraumatic.      Nose: Nose normal.      Mouth/Throat:      Mouth: Mucous membranes are moist.      Pharynx: Oropharynx is clear.   Eyes:      Conjunctiva/sclera: Conjunctivae normal.      Pupils: Pupils are equal, round, and reactive to light.   Cardiovascular:      Rate and  Rhythm: Tachycardia present. Rhythm irregular.      Pulses: Normal pulses.      Heart sounds: Normal heart sounds.   Pulmonary:      Effort: Pulmonary effort is normal. No respiratory distress.      Breath sounds: Normal breath sounds. No wheezing.   Abdominal:      General: Abdomen is flat. Bowel sounds are normal.      Palpations: Abdomen is soft.      Tenderness: There is no abdominal tenderness. There is no guarding or rebound.   Musculoskeletal:         General: No signs of injury. Normal range of motion.      Cervical back: Normal range of motion and neck supple.      Right lower leg: No edema.      Left lower leg: No edema.   Skin:     General: Skin is warm and dry.   Neurological:      General: No focal deficit present.      Mental Status: He is alert and oriented to person, place, and time. Mental status is at baseline.      Motor: No weakness.      Comments: Mild tremors present b/l   Psychiatric:         Mood and Affect: Mood normal.         Behavior: Behavior normal.         Thought Content: Thought content normal.          Lines/Drains:            Lab Results: I have reviewed the following results:  Results from last 7 days   Lab Units 05/13/25  1102   WBC Thousand/uL 5.49   HEMOGLOBIN g/dL 10.2*   HEMATOCRIT % 32.3*   PLATELETS Thousands/uL 200   SEGS PCT % 55   LYMPHO PCT % 20   MONO PCT % 20*   EOS PCT % 2     Results from last 7 days   Lab Units 05/13/25  1102   SODIUM mmol/L 140   POTASSIUM mmol/L 3.5   CHLORIDE mmol/L 103   CO2 mmol/L 22   BUN mg/dL 8   CREATININE mg/dL 0.70   ANION GAP mmol/L 15*   CALCIUM mg/dL 7.0*   ALBUMIN g/dL 2.9*   TOTAL BILIRUBIN mg/dL 0.62   ALK PHOS U/L 118*   ALT U/L 31   AST U/L 46*   GLUCOSE RANDOM mg/dL 78     Results from last 7 days   Lab Units 05/13/25  1102   INR  1.00     Results from last 7 days   Lab Units 05/13/25  1210 05/13/25  1051   POC GLUCOSE mg/dl 121 85     Lab Results   Component Value Date    HGBA1C 5.4 02/15/2025    HGBA1C 5.2 11/14/2024    HGBA1C  5.4 08/15/2024           Imaging Results Review: I personally reviewed the following image studies/reports in PACS and discussed pertinent findings with Radiology: CT head and CT C-spine. My interpretation of the radiology images/reports is: No acute fracture or hemorrhage.  Other Study Results Review: EKG was reviewed.     Administrative Statements   I have spent a total time of 35 minutes in caring for this patient on the day of the visit/encounter including Diagnostic results, Prognosis, Risks and benefits of tx options, Importance of tx compliance, Risk factor reductions, Impressions, Counseling / Coordination of care, Documenting in the medical record, Reviewing/placing orders in the medical record (including tests, medications, and/or procedures), and Obtaining or reviewing history  .

## 2025-05-13 NOTE — ASSESSMENT & PLAN NOTE
Chronic.  Last lipid panel showed total cholesterol of 153, triglycerides of 101, HDL of 46 and LDL of 87    Continue with home Lipitor

## 2025-05-13 NOTE — ASSESSMENT & PLAN NOTE
Chronic stable. Not in acute exacerbation. Home on Albuterol inhaler and Breo Ellipta inhaler.     Continue home inhalers  Monitor oxygen saturation  Monitor respiratory status

## 2025-05-13 NOTE — ASSESSMENT & PLAN NOTE
Chronic, stable, PMH of prostate CA (2020), s/p resection 2021. Home medication flomax 0.4 mg daily

## 2025-05-13 NOTE — ASSESSMENT & PLAN NOTE
Patient presented to the ED after being intoxicated and was found to be in Afib w/ RVR.  Received 5 mg IV Lopressor added to NSTEMI in the ED  ED Course: Valium 10 mg IV, NS bolus 1000 ml, dextrose 5% and NS bolus 1000 ml, thiamine 100 mg IV     Plan:   Continue home Eliquis 5 mg BID  Continue home 150 mg Metoprolol Q12  Continue home 120 mg Cardizem Q12  Monitor telemetry  Continue with IV fluids  Continue to monitor

## 2025-05-13 NOTE — ED PROVIDER NOTES
ED Course as of 05/13/25 0720   Tue May 13, 2025   0622 Procedure Note: EKG  Date/Time: 05/13/25 6:22 AM   Interpreted by: Fito Liang  Indications / Diagnosis: fast  ECG reviewed by me, the ED Provider: yes   The EKG demonstrates:  Rhythm: afib rvr  Intervals: normal intervals  Axis: normal axis  QRS/Blocks: normal QRS  ST Changes: no acute KAREN. Lateral STD c/w prior   T wave changes: nonspec       0624 Second lopressor, second L fluid, add valium, re eval RVR for admit         Sign out is to watch patient sober  He does  Not a lot of withdrawal symptoms, snoring    Always in afib  S/p ACS r/o w/ delta trop    Closer to d/c time starts getting fast HR like 160s  Given lopressor, second fluid bolus  Tells me doesn't take his meds    Continues around 120s.   Given second lopressor and valium in case it's withdrawal issue though doesn't look like it    Continues  obs     Fito Liang MD  05/13/25 0722

## 2025-05-13 NOTE — QUICK NOTE
Completed PM rounds with senior. Sleeping comfortably in bed and now questions or concerns at this time.

## 2025-05-13 NOTE — ASSESSMENT & PLAN NOTE
QTC interval of 463 on EKG  EKG: Afib with RVR    Plan:   Avoid QTC prolonging medications  Continue to monitor

## 2025-05-13 NOTE — PLAN OF CARE
Problem: Potential for Falls  Goal: Patient will remain free of falls  Description: INTERVENTIONS:- Educate patient/family on patient safety including physical limitations- Instruct patient to call for assistance with activity - Consult OT/PT to assist with strengthening/mobility - Keep Call bell within reach- Keep bed low and locked with side rails adjusted as appropriate- Keep care items and personal belongings within reach- Initiate and maintain comfort rounds- Make Fall Risk Sign visible to staff- Offer Toileting every 2 Hours, in advance of need- Initiate/Maintain bed alarm-  Apply yellow socks and bracelet for high fall risk patients- Consider moving patient to room near nurses station  INTERVENTIONS:- Educate patient/family on patient safety including physical limitations- Instruct patient to call for assistance with activity - Consult OT/PT to assist with strengthening/mobility - Keep Call bell within reach- Keep bed low and locked with side rails adjusted as appropriate- Keep care items and personal belongings within reach- Initiate and maintain comfort rounds- Make Fall Risk Sign visible to staff- Offer Toileting every 2 Hours, in advance of need- Initiate/Maintain bed alarm- Apply yellow socks and bracelet for high fall risk patients- Consider moving patient to room near nurses station  Outcome: Progressing     Problem: PAIN - ADULT  Goal: Verbalizes/displays adequate comfort level or baseline comfort level  Description: Interventions:- Encourage patient to monitor pain and request assistance- Assess pain using appropriate pain scale- Administer analgesics based on type and severity of pain and evaluate response- Implement non-pharmacological measures as appropriate and evaluate response- Consider cultural and social influences on pain and pain management- Notify physician/advanced practitioner if interventions unsuccessful or patient reports new pain  Outcome: Progressing     Problem: SAFETY  ADULT  Goal: Patient will remain free of falls  Description: INTERVENTIONS:- Educate patient/family on patient safety including physical limitations- Instruct patient to call for assistance with activity - Consult OT/PT to assist with strengthening/mobility - Keep Call bell within reach- Keep bed low and locked with side rails adjusted as appropriate- Keep care items and personal belongings within reach- Initiate and maintain comfort rounds- Make Fall Risk Sign visible to staff- Offer Toileting every 2 Hours, in advance of need- Initiate/Maintain bed alarm- Apply yellow socks and bracelet for high fall risk patients- Consider moving patient to room near nurses station  INTERVENTIONS:- Educate patient/family on patient safety including physical limitations- Instruct patient to call for assistance with activity - Consult OT/PT to assist with strengthening/mobility - Keep Call bell within reach- Keep bed low and locked with side rails adjusted as appropriate- Keep care items and personal belongings within reach- Initiate and maintain comfort rounds- Make Fall Risk Sign visible to staff- Offer Toileting every 2 Hours, in advance of need- Initiate/Maintain bed alarm- Apply yellow socks and bracelet for high fall risk patients- Consider moving patient to room near nurses station  Outcome: Progressing  Goal: Maintain or return to baseline ADL function  Description: INTERVENTIONS:-  Assess patient's ability to carry out ADLs; assess patient's baseline for ADL function and identify physical deficits which impact ability to perform ADLs (bathing, care of mouth/teeth, toileting, grooming, dressing, etc.)- Assess/evaluate cause of self-care deficits - Assess range of motion- Assess patient's mobility; develop plan if impaired- Assess patient's need for assistive devices and provide as appropriate- Encourage maximum independence but intervene and supervise when necessary- Involve family in performance of ADLs- Assess for home  care needs following discharge - Consider OT consult to assist with ADL evaluation and planning for discharge- Provide patient education as appropriate  Outcome: Progressing  Goal: Maintains/Returns to pre admission functional level  Description: INTERVENTIONS:- Perform AM-PAC 6 Click Basic Mobility/ Daily Activity assessment daily.- Set and communicate daily mobility goal to care team and patient/family/caregiver. - Collaborate with rehabilitation services on mobility goals if consulted- Perform Range of Motion 3 times a day.- Reposition patient every 2 hours hours.- Dangle patient 3 times a day- Stand patient 3 times a day- Ambulate patient 3 times a day- Out of bed to chair 3 times a day - Out of bed for meals 3 times a day- Out of bed for toileting- Record patient progress and toleration of activity level   Outcome: Progressing     Problem: DISCHARGE PLANNING  Goal: Discharge to home or other facility with appropriate resources  Description: INTERVENTIONS:- Identify barriers to discharge w/patient and caregiver- Arrange for needed discharge resources and transportation as appropriate- Identify discharge learning needs (meds, wound care, etc.)- Arrange for interpretive services to assist at discharge as needed- Refer to Case Management Department for coordinating discharge planning if the patient needs post-hospital services based on physician/advanced practitioner order or complex needs related to functional status, cognitive ability, or social support system  Outcome: Progressing     Problem: Knowledge Deficit  Goal: Patient/family/caregiver demonstrates understanding of disease process, treatment plan, medications, and discharge instructions  Description: Complete learning assessment and assess knowledge base.Interventions:- Provide teaching at level of understanding- Provide teaching via preferred learning methods  Outcome: Progressing     Problem: CARDIOVASCULAR - ADULT  Goal: Maintains optimal cardiac  output and hemodynamic stability  Description: INTERVENTIONS:- Monitor I/O, vital signs and rhythm- Monitor for S/S and trends of decreased cardiac output- Administer and titrate ordered vasoactive medications to optimize hemodynamic stability- Assess quality of pulses, skin color and temperature- Assess for signs of decreased coronary artery perfusion- Instruct patient to report change in severity of symptoms  Outcome: Progressing  Goal: Absence of cardiac dysrhythmias or at baseline rhythm  Description: INTERVENTIONS:- Continuous cardiac monitoring, vital signs, obtain 12 lead EKG if ordered- Administer antiarrhythmic and heart rate control medications as ordered- Monitor electrolytes and administer replacement therapy as ordered  Outcome: Progressing     Problem: RESPIRATORY - ADULT  Goal: Achieves optimal ventilation and oxygenation  Description: INTERVENTIONS:- Assess for changes in respiratory status- Assess for changes in mentation and behavior- Position to facilitate oxygenation and minimize respiratory effort- Oxygen administered by appropriate delivery if ordered- Initiate smoking cessation education as indicated- Encourage broncho-pulmonary hygiene including cough, deep breathe, Incentive Spirometry- Assess the need for suctioning and aspirate as needed- Assess and instruct to report SOB or any respiratory difficulty- Respiratory Therapy support as indicated  Outcome: Progressing     Problem: GENITOURINARY - ADULT  Goal: Maintains or returns to baseline urinary function  Description: INTERVENTIONS:- Assess urinary function- Encourage oral fluids to ensure adequate hydration if ordered- Administer IV fluids as ordered to ensure adequate hydration- Administer ordered medications as needed- Offer frequent toileting- Follow urinary retention protocol if ordered  Outcome: Progressing  Goal: Absence of urinary retention  Description: INTERVENTIONS:- Assess patient’s ability to void and empty bladder- Monitor  I/O- Bladder scan as needed- Discuss with physician/AP medications to alleviate retention as needed- Discuss catheterization for long term situations as appropriate  Outcome: Progressing  Goal: Urinary catheter remains patent  Description: INTERVENTIONS:- Assess patency of urinary catheter- If patient has a chronic parkinson, consider changing catheter if non-functioning- Follow guidelines for intermittent irrigation of non-functioning urinary catheter  Outcome: Progressing     Problem: METABOLIC, FLUID AND ELECTROLYTES - ADULT  Goal: Electrolytes maintained within normal limits  Description: INTERVENTIONS:- Monitor labs and assess patient for signs and symptoms of electrolyte imbalances- Administer electrolyte replacement as ordered- Monitor response to electrolyte replacements, including repeat lab results as appropriate- Instruct patient on fluid and nutrition as appropriate  Outcome: Progressing  Goal: Fluid balance maintained  Description: INTERVENTIONS:- Monitor labs - Monitor I/O and WT- Instruct patient on fluid and nutrition as appropriate- Assess for signs & symptoms of volume excess or deficit  Outcome: Progressing  Goal: Glucose maintained within target range  Description: INTERVENTIONS:- Monitor Blood Glucose as ordered- Assess for signs and symptoms of hyperglycemia and hypoglycemia- Administer ordered medications to maintain glucose within target range- Assess nutritional intake and initiate nutrition service referral as needed  Outcome: Progressing     Problem: SKIN/TISSUE INTEGRITY - ADULT  Goal: Skin Integrity remains intact(Skin Breakdown Prevention)  Description: Assess:-Perform Herrera assessment every shift-Clean and moisturize skin every 4 hours-Inspect skin when repositioning, toileting, and assisting with ADLS-Assess extremities for adequate circulation and sensation Bed Management:-Have minimal linens on bed & keep smooth, unwrinkled-Change linens as needed when moist or perspiring-Avoid sitting  or lying in one position for more than 2 hours while in bed-Keep HOB at 45 degrees Toileting:-Offer bedside commode-Assess for incontinence every 2 hours-Use incontinent care products after each incontinent episode such as gray wipes. Activity:-Mobilize patient 3 times a day-Encourage activity and walks on unit-Encourage or provide ROM exercises -Turn and reposition patient every 2 Hours-Use appropriate equipment to lift or move patient in bed-Instruct/ Assist with weight shifting every 2 hours  when out of bed in chair-Consider limitation of chair time 2 hour intervalsSkin Care:-Avoid use of baby powder, tape, friction and shearing, hot water or constrictive clothing-Do not massage red bony areasNext Steps:  Outcome: Progressing  Goal: Incision(s), wounds(s) or drain site(s) healing without S/S of infection  Description: INTERVENTIONS- Assess and document dressing, incision, wound bed, drain sites and surrounding tissue- Provide patient and family education- Perform skin care/dressing changes every shift change  Outcome: Progressing  Goal: Pressure injury heals and does not worsen  Description: Interventions:- Implement low air loss mattress or specialty surface (Criteria met)- Apply silicone foam dressing- Instruct/assist with weight shifting every 120 minutes when in chair - Limit chair time to 2 hour intervals- Use special pressure reducing interventions such as waffle cushion when in chair - Apply fecal or urinary incontinence containment device - Perform passive or active ROM every 3 hours- Turn and reposition patient & offload bony prominences every 2 hours - Utilize friction reducing device or surface for transfers -  Outcome: Progressing     Problem: HEMATOLOGIC - ADULT  Goal: Maintains hematologic stability  Description: INTERVENTIONS- Assess for signs and symptoms of bleeding or hemorrhage- Monitor labs- Administer supportive blood products/factors as ordered and appropriate  Outcome: Progressing      Problem: MUSCULOSKELETAL - ADULT  Goal: Maintain or return mobility to safest level of function  Description: INTERVENTIONS:- Assess patient's ability to carry out ADLs; assess patient's baseline for ADL function and identify physical deficits which impact ability to perform ADLs (bathing, care of mouth/teeth, toileting, grooming, dressing, etc.)- Assess/evaluate cause of self-care deficits - Assess range of motion- Assess patient's mobility- Assess patient's need for assistive devices and provide as appropriate- Encourage maximum independence but intervene and supervise when necessary- Involve family in performance of ADLs- Assess for home care needs following discharge - Consider OT consult to assist with ADL evaluation and planning for discharge- Provide patient education as appropriate  Outcome: Progressing  Goal: Maintain proper alignment of affected body part  Description: INTERVENTIONS:- Support, maintain and protect limb and body alignment- Provide patient/ family with appropriate education  Outcome: Progressing

## 2025-05-13 NOTE — ASSESSMENT & PLAN NOTE
Patient has a h/o CAD s/p CABG. Patient is home on Aspirin 81 mg EC tablet daily, Ranolazine 500 mg Q12, and Eliquis 5 mg BID.     Continue home Aspirin 81mg   Continue home Eliquis   Continue home Ranolazine

## 2025-05-13 NOTE — ASSESSMENT & PLAN NOTE
Pt arrived after neighbor called 911 due to patient being intoxicated and screaming for an ambulance. Once BLS arrived pt c/o weakness and drinking liter of vodka.   Patient is not sure the last time he took his medications and the last time he ate    ED Course: 1L NS, 5 mg Lopressor inj. X 2, 1mg IV Folic acid, 10 mg Valium inj., 100 mg IV Thiamine, 1L D5NS bolus    Ethanol level: 51    Plan  Continue home Folate, Thiamine  CIWA protocol  Fall precaution   Monitor mental status   Seizure precautions   75 mL/hr IV D5NS continuous  Aspiration precautions

## 2025-05-13 NOTE — ED PROVIDER NOTES
Time reflects when diagnosis was documented in both MDM as applicable and the Disposition within this note       Time User Action Codes Description Comment    5/13/2025 12:53 AM Gilberto North Add [F10.929] Alcohol intoxication (HCC)           ED Disposition       ED Disposition   Discharge    Condition   Stable    Date/Time   Tue May 13, 2025 12:49 AM    Comment   Gregg Partida discharge to home/self care.                   Assessment & Plan       Medical Decision Making  Patient is a 65 y.o. male who presents to the ED for generalized weakness, alcohol intoxication.  Patient is nontoxic, well-appearing.     Differential includes but is not limited to: Alcohol intoxication, dehydration, electrolyte abnormality    Plan: Labs, IV fluids, reassess.  No indication for imaging at this time                   Amount and/or Complexity of Data Reviewed  Labs: ordered. Decision-making details documented in ED Course.        ED Course as of 05/13/25 0053   Tue May 13, 2025   0038 Delta 2hr hsTnI: -3   0051 Pt signed out to Dr Liang. D/C once awake       Medications   dextrose 5 % and sodium chloride 0.9 % bolus 1,000 mL (0 mL Intravenous Stopped 5/13/25 0004)   thiamine (VITAMIN B1) 100 mg in sodium chloride 0.9 % 50 mL IVPB (0 mg Intravenous Stopped 5/12/25 2238)   folic acid 1 mg in sodium chloride 0.9 % 50 mL IVPB (1 mg Intravenous New Bag 5/13/25 0005)       ED Risk Strat Scores   HEART Risk Score      Flowsheet Row Most Recent Value   Heart Score Risk Calculator    History 0 Filed at: 05/13/2025 0052   ECG 1 Filed at: 05/13/2025 0052   Age 1 Filed at: 05/13/2025 0052   Risk Factors 2 Filed at: 05/13/2025 0052   Troponin 1 Filed at: 05/13/2025 0052   HEART Score 5 Filed at: 05/13/2025 0052          HEART Risk Score      Flowsheet Row Most Recent Value   Heart Score Risk Calculator    History 0 Filed at: 05/13/2025 0052   ECG 1 Filed at: 05/13/2025 0052   Age 1 Filed at: 05/13/2025 0052   Risk Factors 2 Filed at:  05/13/2025 0052   Troponin 1 Filed at: 05/13/2025 0052   HEART Score 5 Filed at: 05/13/2025 0052                      No data recorded        SBIRT 22yo+      Flowsheet Row Most Recent Value   Initial Alcohol Screen: US AUDIT-C     1. How often do you have a drink containing alcohol? 6 Filed at: 05/13/2025 0050   2. How many drinks containing alcohol do you have on a typical day you are drinking?  6 Filed at: 05/13/2025 0050   3a. Male UNDER 65: How often do you have five or more drinks on one occasion? 6 Filed at: 05/13/2025 0050   Audit-C Score 18 Filed at: 05/13/2025 0050   BRIGIDA: How many times in the past year have you...    Used an illegal drug or used a prescription medication for non-medical reasons? Never Filed at: 05/13/2025 0050                            History of Present Illness       Chief Complaint   Patient presents with    Alcohol Intoxication     Pt arrived after neighbor called 911 due to patient being intoxicated and screaming for an ambulance. Once BLS arrived pt c/o weakness and drinking liter of vodka.       Past Medical History:   Diagnosis Date    Acute on chronic kidney failure  (HCC)     Alcohol abuse     Alcohol withdrawal (HCC) 06/07/2019    Atrial fibrillation (HCC)     Cancer (HCC)     prostate ca,had radiation    Cardiac disease     stents,then triple bypass    COPD (chronic obstructive pulmonary disease) (HCC)     Coronary artery disease     ETOH abuse     Heart failure (HCC)     History of heart surgery     says [facility] bypass [facility]    Hx of heart artery stent     2014    Hyperlipidemia     Hypertension     Hyponatremia 10/17/2019    Hypovolemic shock (HCC) 12/22/2019    Lumbar spondylitis (HCC) 10/13/2022    Metabolic acidosis, increased anion gap 04/21/2021    Nasal bone fracture 10/10/2022    Prostate CA (HCC)     S/P CABG x 3     2004    Sleep apnea       Past Surgical History:   Procedure Laterality Date    CARDIAC CATHETERIZATION      2 stents    CORONARY ARTERY  "BYPASS GRAFT      CORONARY ARTERY BYPASS GRAFT  2004    [facility] ANT INTERBODY MIN DSC CRV BELOW C2 N/A 12/16/2020    Procedure: Anterior cervical discectomy with fusion C4-C7; Posterior cervical decompression and fusion C2-T2;  Surgeon: David Rowell MD;  Location: [facility];  Service: Neurosurgery    TONSILLECTOMY        Family History   Problem Relation Age of Onset    Diabetes Mother     Uterine cancer Mother     COPD Father     Hypertension Father       Social History     Tobacco Use    Smoking status: Every Day     Current packs/day: 1.50     Average packs/day: 1.5 packs/day for 40.0 years (60.0 ttl pk-yrs)     Types: Cigarettes    Smokeless tobacco: Never   Vaping Use    Vaping status: Never Used   Substance Use Topics    Alcohol use: Yes     Alcohol/week: 4.0 standard drinks of alcohol     Types: 4 Standard drinks or equivalent per week     Comment: \"quart a day\"    Drug use: No      E-Cigarette/Vaping    E-Cigarette Use Never User       E-Cigarette/Vaping Substances    Nicotine Yes     THC No     CBD No     Flavoring No     Other No     Unknown No       I have reviewed and agree with the history as documented.     65-year-old male, past medical history including alcohol abuse, A-fib, who presents to the emergency department for generalized weakness.  Per EMS patient had his neighbor called 911 due to the weakness.  States the weakness is present for an hour.  Admits to drinking today.  No trauma.  Does not want help with his drinking.  No other complaints or concerns.    Chart reviewed.  Patient has been seen multiple times in the emergency room for alcohol use, generalized weakness.      Alcohol Intoxication      Review of Systems   All other systems reviewed and are negative.          Objective       ED Triage Vitals [05/12/25 2215]   Temp Pulse Blood Pressure Respirations SpO2 Patient Position - Orthostatic VS   -- (!) 114 129/72 16 92 % Lying      Temp src Heart Rate Source BP Location FiO2 (%) Pain " Score    -- Monitor Right arm -- --      Vitals      Date and Time Temp Pulse SpO2 Resp BP Pain Score FACES Pain Rating User   05/13/25 0000 -- 102 92 % 23 117/62 -- -- AM   05/12/25 2300 -- 107 91 % 21 128/60 -- -- AM   05/12/25 2230 -- 105 94 % 20 124/68 -- -- RCC   05/12/25 2215 -- 114 92 % 16 129/72 -- -- RCC            Physical Exam  Vitals and nursing note reviewed.   Constitutional:       General: He is not in acute distress.     Appearance: He is well-developed. He is not diaphoretic.   HENT:      Head: Normocephalic and atraumatic.      Right Ear: External ear normal.      Left Ear: External ear normal.      Nose: Nose normal.   Eyes:      General: Lids are normal. No scleral icterus.  Cardiovascular:      Rate and Rhythm: Tachycardia present. Rhythm irregular.      Heart sounds: Normal heart sounds. No murmur heard.     No friction rub. No gallop.   Pulmonary:      Effort: Pulmonary effort is normal. No respiratory distress.      Breath sounds: Normal breath sounds. No wheezing or rales.   Abdominal:      Palpations: Abdomen is soft.      Tenderness: There is no abdominal tenderness. There is no guarding or rebound.   Musculoskeletal:         General: No deformity. Normal range of motion.      Cervical back: Normal range of motion and neck supple.   Skin:     General: Skin is warm and dry.   Neurological:      General: No focal deficit present.      Mental Status: He is alert.   Psychiatric:         Mood and Affect: Mood normal.         Behavior: Behavior normal.         Results Reviewed       Procedure Component Value Units Date/Time    HS Troponin I 2hr [[id number]]  (Normal) Collected: 05/13/25 0005    Lab Status: Final result Specimen: Blood from Hand, Left Updated: 05/13/25 0036     hs TnI 2hr 25 ng/L      Delta 2hr hsTnI -3 ng/L     HS Troponin I 4hr [[id number]]     Lab Status: No result Specimen: Blood     HS Troponin 0hr (reflex protocol) [[id number]]  (Normal) Collected: 05/12/25 2212    Lab Status:  Final result Specimen: Blood from Hand, Right Updated: 05/12/25 2240     hs TnI 0hr 28 ng/L     Basic metabolic panel [[id number]]  (Abnormal) Collected: 05/12/25 2204    Lab Status: Final result Specimen: Blood from Hand, Left Updated: 05/12/25 2228     Sodium 140 mmol/L      Potassium 4.8 mmol/L      Chloride 102 mmol/L      CO2 24 mmol/L      ANION GAP 14 mmol/L      BUN 9 mg/dL      Creatinine 0.80 mg/dL      Glucose 80 mg/dL      Calcium 7.2 mg/dL      eGFR 95 ml/min/1.73sq m     Narrative:      National Kid[employer] Disease Foundation guidelines for Chronic Kidney Disease (CKD):     Stage 1 with normal or high GFR (GFR > 90 mL/min/1.73 square meters)    Stage 2 Mild CKD (GFR = 60-89 mL/min/1.73 square meters)    Stage 3A Moderate CKD (GFR = 45-59 mL/min/1.73 square meters)    Stage 3B Moderate CKD (GFR = 30-44 mL/min/1.73 square meters)    Stage 4 Severe CKD (GFR = 15-29 mL/min/1.73 square meters)    Stage 5 End Stage CKD (GFR <15 mL/min/1.73 square meters)  Note: GFR calculation is accurate only with a steady state creatinine    CBC and differential [[id number]]  (Abnormal) Collected: 05/12/25 2204    Lab Status: Final result Specimen: Blood from Hand, Left Updated: 05/12/25 2208     WBC 4.82 Thousand/uL      RBC 3.26 Million/uL      Hemoglobin 10.3 g/dL      Hematocrit 31.8 %      MCV 98 fL      MCH 31.6 pg      MCHC 32.4 g/dL      RDW 20.2 %      MPV 9.9 fL      Platelets 207 Thousands/uL      nRBC 0 /100 WBCs      Segmented % 49 %      Immature Grans % 1 %      Lymphocytes % 30 %      Monocytes % 16 %      Eosinophils Relative 1 %      Basophils Relative 3 %      Absolute Neutrophils 2.43 Thousands/µL      Absolute Immature Grans 0.04 Thousand/uL      Absolute Lymphocytes 1.43 Thousands/µL      Absolute Monocytes 0.76 Thousand/µL      Eosinophils Absolute 0.03 Thousand/µL      Basophils Absolute 0.13 Thousands/µL             No orders to display       ECG 12 Lead Documentation Only    Date/Time: 5/12/2025 10:33  PM    Performed by: Gilberto North DO  Authorized by: Gilberto North DO    ECG reviewed by me, the ED Provider: yes    Patient location:  ED  Interpretation:     Interpretation: abnormal    Rate:     ECG rate:  103    ECG rate assessment: tachycardic    Rhythm:     Rhythm: atrial fibrillation    Ectopy:     Ectopy: none    QRS:     QRS axis:  Normal  Conduction:     Conduction: normal    ST segments:     ST segments:  Non-specific    Depression:  V4, V5 and V6  T waves:     T waves: inverted    Comments:      EKG similar to previous      ED Medication and Procedure Management   Prior to Admission Medications   Prescriptions Last Dose Informant Patient Reported? Taking?   Blood Glucose Monitoring Suppl (ONE TOUCH ULTRA MINI) w/Device KIT  Self Yes No   Sig: Use as directed   Blood Pressure Monitoring (Adult Blood Pressure Cuff Lg) KIT   No No   Sig: Use in the morning   Breo Ellipta 200-25 MCG/ACT inhaler   No No   Sig: Inhale 1 puff daily Rinse mouth after use.   ONETOUCH DELICA LANCETS FINE MISC  Self Yes No   Sig: 3 (three) times a day Test   Thiamine HCl (vitamin B-1) 100 MG TABS   No No   Sig: Take 1 tablet (100 mg total) by mouth daily   albuterol (PROVENTIL HFA,VENTOLIN HFA) 90 mcg/act inhaler   Yes No   Sig: Inhale 2 puffs every 4 (four) hours as needed for wheezing   albuterol (Ventolin HFA) 90 mcg/act inhaler   No No   Sig: Inhale 2 puffs every 4 (four) hours as needed for wheezing   aluminum-magnesium hydroxide-simethicone (MAALOX) 0776-1437-380 mg/30 mL suspension   No No   Sig: Take 30 mL by mouth every 4 (four) hours as needed for indigestion or heartburn   aluminum-magnesium hydroxide-simethicone (MAALOX) 200-200-20 MG/5ML SUSP   Yes No   Sig: Take 30 mL by mouth 4 (four) times a day (before meals and at bedtime)   apixaban (ELIQUIS) 5 mg   Yes No   Sig: Take 5 mg by mouth 2 (two) times a day   apixaban (Eliquis) 5 mg   No No   Sig: Take 1 tablet (5 mg total) by mouth 2 (two) times a day    aspirin 81 mg EC tablet   No No   Sig: Take 1 tablet (81 mg total) by mouth daily   aspirin 81 mg chewable tablet   Yes No   Sig: Chew 81 mg daily   atorvastatin (LIPITOR) 40 mg tablet   No No   Sig: Take 1 tablet (40 mg total) by mouth daily   atorvastatin (LIPITOR) 40 mg tablet   Yes No   Sig: Take 40 mg by mouth daily   diltiazem (CARDIZEM SR) 120 mg 12 hr capsule   No No   Sig: Take 1 capsule (120 mg total) by mouth every 12 (twelve) hours   diltiazem (CARDIZEM SR) 120 mg 12 hr capsule   Yes No   Sig: Take 120 mg by mouth 2 (two) times a day   ferrous sulfate 325 (65 Fe) mg tablet   No No   Sig: Take 1 tablet (325 mg total) by mouth daily with breakfast   ferrous sulfate 325 (65 Fe) mg tablet   Yes No   Sig: Take 325 mg by mouth daily with breakfast   fluticasone-vilanterol (Breo Ellipta) 200-25 mcg/actuation inhaler   Yes No   Sig: Inhale 1 puff daily Rinse mouth after use.   folic acid (FOLVITE) 1 mg tablet   No No   Sig: Take 1 tablet (1,000 mcg total) by mouth daily   folic acid (FOLVITE) 1 mg tablet   Yes No   Sig: Take by mouth daily   gabapentin (NEURONTIN) 300 mg capsule   No No   Sig: Take 1 capsule (300 mg total) by mouth 3 (three) times a day   gabapentin (NEURONTIN) 300 mg capsule   Yes No   Sig: Take 300 mg by mouth 3 (three) times a day   lidocaine (LIDODERM) 5 %   Yes No   Sig: Apply 1 patch topically daily Remove & Discard patch within 12 hours or as directed by MD   lidocaine (Lidoderm) 5 %   No No   Sig: Apply 1 patch topically over 12 hours daily Remove & Discard patch within 12 hours or as directed by MD   magnesium Oxide (MAG-OX) 400 mg TABS   No No   Sig: Take 2 tablets (800 mg total) by mouth 2 (two) times a day   magnesium oxide-pyridoxine (BEELITH) 362-20 MG TABS   Yes No   Sig: Take 1 tablet by mouth daily   metFORMIN (GLUCOPHAGE) 500 mg tablet   No No   Sig: Take 1 tablet (500 mg total) by mouth daily with breakfast   metFORMIN (GLUCOPHAGE) 500 mg tablet   Yes No   Sig: Take 500 mg  by mouth daily with breakfast   metoprolol succinate (TOPROL-XL) 100 mg 24 hr tablet   Yes No   Sig: Take 150 mg by mouth every 12 (twelve) hours   metoprolol tartrate (LOPRESSOR) 100 mg tablet   No No   Sig: Take 1.5 tablets (150 mg total) by mouth every 12 (twelve) hours   naltrexone (REVIA) 50 mg tablet   Yes No   Sig: Take 50 mg by mouth daily   naltrexone (REVIA) 50 mg tablet   Yes No   Sig: Take 50 mg by mouth daily   nicotine (NICODERM CQ) 21 mg/24 hr TD 24 hr patch   No No   Sig: Place 1 patch on the skin over 24 hours daily   nicotine (NICODERM CQ) 21 mg/24 hr TD 24 hr patch   Yes No   Sig: Place 1 patch on the skin every 24 hours   pantoprazole (PROTONIX) 40 mg tablet   No No   Sig: Take 1 tablet (40 mg total) by mouth daily   pantoprazole (PROTONIX) 40 mg tablet   Yes No   Sig: Take 40 mg by mouth daily   pyridoxine (VITAMIN B6) 100 mg tablet   Yes No   Sig: Take 100 mg by mouth daily   ranolazine (RANEXA) 500 mg 12 hr tablet   No No   Sig: Take 1 tablet (500 mg total) by mouth every 12 (twelve) hours   ranolazine (RANEXA) 500 mg 12 hr tablet   Yes No   Sig: Take 500 mg by mouth 2 (two) times a day   saccharomyces boulardii (FLORASTOR) 250 mg capsule   No No   Sig: Take 1 capsule (250 mg total) by mouth 2 (two) times a day   saccharomyces boulardii (FLORASTOR) 250 mg capsule   Yes No   Sig: Take 250 mg by mouth 2 (two) times a day   senna-docusate sodium (SENOKOT S) 8.6-50 mg per tablet   No No   Sig: Take 1 tablet by mouth daily as needed for constipation   senna-docusate sodium (SENOKOT-S) 8.6-50 mg per tablet   Yes No   Sig: Take 1 tablet by mouth daily   tamsulosin (FLOMAX) 0.4 mg   No No   Sig: Take 1 capsule (0.4 mg total) by mouth daily   tamsulosin (FLOMAX) 0.4 mg   Yes No   Sig: Take 0.4 mg by mouth daily with dinner      Facility-Administered Medications: None     Patient's Medications   Discharge Prescriptions    No medications on file     No discharge procedures on file.  ED SEPSIS  DOCUMENTATION   Time reflects when diagnosis was documented in both MDM as applicable and the Disposition within this note       Time User Action Codes Description Comment    5/13/2025 12:53 AM Gilberto North Add [F10.929] Alcohol intoxication (HCC)                  Gilberto North,   05/13/25 0053       Gilberto North,   05/13/25 0053

## 2025-05-13 NOTE — ASSESSMENT & PLAN NOTE
Lab Results   Component Value Date    HGBA1C 5.4 02/15/2025       Recent Labs     05/13/25  1051 05/13/25  1210   POCGLU 85 121       Blood Sugar Average: Last 72 hrs:  (P) 103  Well-controlled. Patient is on metformin 500 mg daily at home    Plan  Held home metformin  Patient started on insulin sliding scale and Accu-Cheks ACHS  Carbohydrate controlled diet  Hypoglycemia protocol  Monitor blood sugars

## 2025-05-14 VITALS
DIASTOLIC BLOOD PRESSURE: 75 MMHG | WEIGHT: 174.3 LBS | BODY MASS INDEX: 22.37 KG/M2 | RESPIRATION RATE: 20 BRPM | SYSTOLIC BLOOD PRESSURE: 127 MMHG | OXYGEN SATURATION: 94 % | TEMPERATURE: 97.8 F | HEART RATE: 68 BPM | HEIGHT: 74 IN

## 2025-05-14 LAB
ALBUMIN SERPL BCG-MCNC: 2.9 G/DL (ref 3.5–5)
ALP SERPL-CCNC: 106 U/L (ref 34–104)
ALT SERPL W P-5'-P-CCNC: 27 U/L (ref 7–52)
ANION GAP SERPL CALCULATED.3IONS-SCNC: 11 MMOL/L (ref 4–13)
AST SERPL W P-5'-P-CCNC: 39 U/L (ref 13–39)
ATRIAL RATE: 141 BPM
ATRIAL RATE: 267 BPM
ATRIAL RATE: 468 BPM
BASOPHILS # BLD AUTO: 0.08 THOUSANDS/ÂΜL (ref 0–0.1)
BASOPHILS NFR BLD AUTO: 1 % (ref 0–1)
BILIRUB SERPL-MCNC: 0.78 MG/DL (ref 0.2–1)
BUN SERPL-MCNC: 14 MG/DL (ref 5–25)
CA-I BLD-SCNC: 0.93 MMOL/L (ref 1.12–1.32)
CALCIUM ALBUM COR SERPL-MCNC: 7.9 MG/DL (ref 8.3–10.1)
CALCIUM SERPL-MCNC: 7 MG/DL (ref 8.4–10.2)
CHLORIDE SERPL-SCNC: 99 MMOL/L (ref 96–108)
CO2 SERPL-SCNC: 25 MMOL/L (ref 21–32)
CREAT SERPL-MCNC: 0.85 MG/DL (ref 0.6–1.3)
EOSINOPHIL # BLD AUTO: 0.06 THOUSAND/ÂΜL (ref 0–0.61)
EOSINOPHIL NFR BLD AUTO: 1 % (ref 0–6)
ERYTHROCYTE [DISTWIDTH] IN BLOOD BY AUTOMATED COUNT: 19.7 % (ref 11.6–15.1)
GFR SERPL CREATININE-BSD FRML MDRD: 92 ML/MIN/1.73SQ M
GLUCOSE SERPL-MCNC: 120 MG/DL (ref 65–140)
GLUCOSE SERPL-MCNC: 123 MG/DL (ref 65–140)
GLUCOSE SERPL-MCNC: 124 MG/DL (ref 65–140)
HCT VFR BLD AUTO: 30.9 % (ref 36.5–49.3)
HGB BLD-MCNC: 9.4 G/DL (ref 12–17)
IMM GRANULOCYTES # BLD AUTO: 0.03 THOUSAND/UL (ref 0–0.2)
IMM GRANULOCYTES NFR BLD AUTO: 1 % (ref 0–2)
LYMPHOCYTES # BLD AUTO: 1.13 THOUSANDS/ÂΜL (ref 0.6–4.47)
LYMPHOCYTES NFR BLD AUTO: 17 % (ref 14–44)
MAGNESIUM SERPL-MCNC: 1.5 MG/DL (ref 1.9–2.7)
MCH RBC QN AUTO: 31.4 PG (ref 26.8–34.3)
MCHC RBC AUTO-ENTMCNC: 30.4 G/DL (ref 31.4–37.4)
MCV RBC AUTO: 103 FL (ref 82–98)
MONOCYTES # BLD AUTO: 1.31 THOUSAND/ÂΜL (ref 0.17–1.22)
MONOCYTES NFR BLD AUTO: 20 % (ref 4–12)
NEUTROPHILS # BLD AUTO: 3.94 THOUSANDS/ÂΜL (ref 1.85–7.62)
NEUTS SEG NFR BLD AUTO: 60 % (ref 43–75)
NRBC BLD AUTO-RTO: 0 /100 WBCS
PLATELET # BLD AUTO: 157 THOUSANDS/UL (ref 149–390)
PMV BLD AUTO: 10.6 FL (ref 8.9–12.7)
POTASSIUM SERPL-SCNC: 3.8 MMOL/L (ref 3.5–5.3)
PROT SERPL-MCNC: 5.7 G/DL (ref 6.4–8.4)
QRS AXIS: 103 DEGREES
QRS AXIS: 104 DEGREES
QRS AXIS: 88 DEGREES
QRSD INTERVAL: 84 MS
QRSD INTERVAL: 90 MS
QRSD INTERVAL: 90 MS
QT INTERVAL: 288 MS
QT INTERVAL: 340 MS
QT INTERVAL: 356 MS
QTC INTERVAL: 445 MS
QTC INTERVAL: 445 MS
QTC INTERVAL: 463 MS
RBC # BLD AUTO: 2.99 MILLION/UL (ref 3.88–5.62)
SODIUM SERPL-SCNC: 135 MMOL/L (ref 135–147)
T WAVE AXIS: 247 DEGREES
T WAVE AXIS: 257 DEGREES
T WAVE AXIS: 260 DEGREES
VENTRICULAR RATE: 102 BPM
VENTRICULAR RATE: 103 BPM
VENTRICULAR RATE: 144 BPM
WBC # BLD AUTO: 6.55 THOUSAND/UL (ref 4.31–10.16)

## 2025-05-14 PROCEDURE — 97530 THERAPEUTIC ACTIVITIES: CPT

## 2025-05-14 PROCEDURE — 82948 REAGENT STRIP/BLOOD GLUCOSE: CPT

## 2025-05-14 PROCEDURE — 97167 OT EVAL HIGH COMPLEX 60 MIN: CPT

## 2025-05-14 PROCEDURE — 80053 COMPREHEN METABOLIC PANEL: CPT

## 2025-05-14 PROCEDURE — 97163 PT EVAL HIGH COMPLEX 45 MIN: CPT

## 2025-05-14 PROCEDURE — 93010 ELECTROCARDIOGRAM REPORT: CPT | Performed by: INTERNAL MEDICINE

## 2025-05-14 PROCEDURE — 99239 HOSP IP/OBS DSCHRG MGMT >30: CPT | Performed by: INTERNAL MEDICINE

## 2025-05-14 PROCEDURE — 83735 ASSAY OF MAGNESIUM: CPT

## 2025-05-14 PROCEDURE — 85025 COMPLETE CBC W/AUTO DIFF WBC: CPT

## 2025-05-14 PROCEDURE — 82330 ASSAY OF CALCIUM: CPT

## 2025-05-14 PROCEDURE — 97535 SELF CARE MNGMENT TRAINING: CPT

## 2025-05-14 RX ORDER — MAGNESIUM SULFATE HEPTAHYDRATE 40 MG/ML
4 INJECTION, SOLUTION INTRAVENOUS ONCE
Status: COMPLETED | OUTPATIENT
Start: 2025-05-14 | End: 2025-05-14

## 2025-05-14 RX ORDER — NALTREXONE HYDROCHLORIDE 50 MG/1
50 TABLET, FILM COATED ORAL DAILY
Qty: 30 TABLET | Refills: 0 | Status: ON HOLD | OUTPATIENT
Start: 2025-05-14 | End: 2025-06-13

## 2025-05-14 RX ADMIN — APIXABAN 5 MG: 5 TABLET, FILM COATED ORAL at 09:48

## 2025-05-14 RX ADMIN — LIDOCAINE 1 PATCH: 700 PATCH TOPICAL at 10:05

## 2025-05-14 RX ADMIN — TAMSULOSIN HYDROCHLORIDE 0.4 MG: 0.4 CAPSULE ORAL at 09:49

## 2025-05-14 RX ADMIN — ALUMINUM HYDROXIDE, MAGNESIUM HYDROXIDE, AND DIMETHICONE 30 ML: 200; 20; 200 SUSPENSION ORAL at 11:54

## 2025-05-14 RX ADMIN — ASPIRIN 81 MG: 81 TABLET, CHEWABLE ORAL at 09:49

## 2025-05-14 RX ADMIN — DILTIAZEM HYDROCHLORIDE 120 MG: 60 CAPSULE, EXTENDED RELEASE ORAL at 09:49

## 2025-05-14 RX ADMIN — Medication 800 MG: at 09:48

## 2025-05-14 RX ADMIN — GABAPENTIN 300 MG: 300 CAPSULE ORAL at 09:48

## 2025-05-14 RX ADMIN — MAGNESIUM SULFATE HEPTAHYDRATE 4 G: 40 INJECTION, SOLUTION INTRAVENOUS at 10:01

## 2025-05-14 RX ADMIN — THIAMINE HCL TAB 100 MG 100 MG: 100 TAB at 09:48

## 2025-05-14 RX ADMIN — ALUMINUM HYDROXIDE, MAGNESIUM HYDROXIDE, AND DIMETHICONE 30 ML: 200; 20; 200 SUSPENSION ORAL at 09:48

## 2025-05-14 RX ADMIN — FOLIC ACID 1000 MCG: 1 TABLET ORAL at 09:48

## 2025-05-14 RX ADMIN — FLUTICASONE FUROATE AND VILANTEROL TRIFENATATE 1 PUFF: 200; 25 POWDER RESPIRATORY (INHALATION) at 10:00

## 2025-05-14 RX ADMIN — RANOLAZINE 500 MG: 500 TABLET, FILM COATED, EXTENDED RELEASE ORAL at 09:48

## 2025-05-14 RX ADMIN — METOPROLOL SUCCINATE 150 MG: 50 TABLET, EXTENDED RELEASE ORAL at 09:48

## 2025-05-14 RX ADMIN — Medication 250 MG: at 09:59

## 2025-05-14 RX ADMIN — NICOTINE 1 PATCH: 21 PATCH, EXTENDED RELEASE TRANSDERMAL at 09:48

## 2025-05-14 RX ADMIN — Medication 100 MG: at 09:49

## 2025-05-14 RX ADMIN — PANTOPRAZOLE SODIUM 40 MG: 40 TABLET, DELAYED RELEASE ORAL at 09:49

## 2025-05-14 RX ADMIN — FERROUS SULFATE TAB 325 MG (65 MG ELEMENTAL FE) 325 MG: 325 (65 FE) TAB at 09:49

## 2025-05-14 NOTE — ASSESSMENT & PLAN NOTE
Patient presented to the ED after being intoxicated and was found to be in Afib w/ RVR.  Received 5 mg IV Lopressor twice in the ED  ED Course: Valium 10 mg IV, NS bolus 1000 ml, dextrose 5% and NS bolus 1000 ml, thiamine 100 mg IV    Patient was monitored on telemetry and continue to be in rate controlled A-fib     Plan:   Continue home Eliquis 5 mg BID  Continue home 150 mg Metoprolol Q12  Continue home 120 mg Cardizem Q12

## 2025-05-14 NOTE — ASSESSMENT & PLAN NOTE
Pt arrived after neighbor called 911 due to patient being intoxicated and screaming for an ambulance. Once BLS arrived pt c/o weakness and drinking liter of vodka.   Patient is not sure the last time he took his medications and the last time he ate    ED Course: 1L NS, 5 mg Lopressor inj. X 2, 1mg IV Folic acid, 10 mg Valium inj., 100 mg IV Thiamine, 1L D5NS bolus    Ethanol level: 51    Pt was started on CIWA protocol.  Patient was started IV fluids D5NS at 5 mL an hour.  Patient's symptoms eventually improved    Plan  Continue home Folate, Thiamine  With home naltrexone  Follow-up with PCP  Alcohol cessation counseling encouraged

## 2025-05-14 NOTE — OCCUPATIONAL THERAPY NOTE
Occupational Therapy Evaluation/Treatment       05/14/25 1345   OT Last Visit   OT Visit Date 05/14/25   Note Type   Note type Evaluation   Pain Assessment   Pain Assessment Tool 0-10   Pain Score No Pain   Restrictions/Precautions   Other Precautions Chair Alarm;Bed Alarm;Fall Risk   Home Living   Type of Home Apartment   Home Layout One level;Elevator   Bathroom Shower/Tub Tub/shower unit   Bathroom Toilet Standard   Home Equipment   (rollator, O2)   Prior Function   Level of [place] Independent with ADLs;Independent with functional mobility;Needs assistance with IADLS   Lives With Alone   Receives Help From Family   IADLs Independent with meal prep;Independent with medication management;Family/Friend/Other provides transportation   Falls in the last 6 months   (multiple)   General   Additional Pertinent History Pt admitted with ETOH withdrawal.   Subjective   Subjective Pt agreeable to OT evaluation   ADL   Eating Assistance 7  Independent   Grooming Assistance 5  Supervision/Setup   UB Bathing Assistance 5  Supervision/Setup   LB Bathing Assistance 5  Supervision/Setup   UB Dressing Assistance 5  Supervision/Setup   LB Dressing Assistance 5  Supervision/Setup   Toileting Assistance  5  Supervision/Setup   Bed Mobility   Supine to Sit Unable to assess   Sit to Supine 5  Supervision   Transfers   Sit to Stand 5  Supervision   Stand to Sit 5  Supervision   Stand pivot 5  Supervision   Balance   Static Sitting Fair +   Dynamic Sitting Fair +   Static Standing Fair +   Dynamic Standing Fair   Activity Tolerance   Activity Tolerance Patient tolerated treatment well   RUE Assessment   RUE Assessment WFL   LUE Assessment   LUE Assessment WFL   Cognition   Arousal/Participation Alert;Cooperative   Attention Within functional limits   Orientation Level Oriented to person;Oriented to place;Oriented to situation   Following Commands Follows one step commands without difficulty   Assessment   Limitation Decreased UE  strength;Decreased endurance;Decreased ADL status;Decreased self-care trans;Decreased high-level ADLs  (decreased balance and mobility)   Prognosis Good   Assessment Patient evaluated by Occupational Therapy.  Patient admitted with Alcohol withdrawal (HCC).  The patients occupational profile, medical and therapy history includes a extensive additional review of physical, cognitive, or psychosocial history related to current functional performance.  Comorbidities affecting functional mobility and ADLS include: ETOH abuse, afib, noncompliance, fall, nicotine abuse, DM, CKD, COPD.  Prior to admission, patient was independent with functional mobility with rollator, independent with ADLS, and requiring assist for IADLS.  The evaluation identifies the following performance deficits: weakness, increased fall risk, new onset of impairment of functional mobility, decreased ADLS, decreased IADLS, and decreased strength, that result in activity limitations and/or participation restrictions. This evaluation requires clinical decision making of high complexity, because the patient presents with comorbidites that affect occupational performance and required significant modification of tasks or assistance with consideration of multiple treatment options.  The Barthel Index was used as a functional outcome tool presenting with a score of Barthel Index Score: 75, indicating moderate limitations of functional mobility and ADLS.  The patient's raw score on the -PAC Daily Activity Inpatient Short Form is 19. A raw score of greater than or equal to 19 suggests the patient may benefit from discharge to home. Please refer to the recommendation of the Occupational Therapist for safe discharge planning.  Patient will benefit from skilled Occupational Therapy services to address above deficits and facilitate a safe return to prior level of function.   Goals   Patient Goals to get stronger   STG Time Frame   (1-7 days)   Short Term Goal   Goals established to promote Patient Goals: to get stronger: Grooming: independent standing at sink; Bathing: independent; Upper Body Dressing independent; Lower Body Dressing: independent; Toileting: independent; Patient will increase ambulatory standard toilet transfer to independent with least restrictive assistive device to increase performance and safety with ADLS and functional mobility; Patient will increase standing tolerance to 3 minutes during ADL task to decrease assistance level and decrease fall risk; Patient will increase bed mobility to independent in preparation for ADLS and transfers; Patient will increase functional mobility to and from bathroom with least restrictive assistive device independently to increase performance with ADLS and to use a toilet; Patient will tolerate 8 minutes of UE ROM/strengthening to increase general activity tolerance and performance in ADLS/IADLS; Patient will improve functional activity tolerance to 10 minutes of sustained functional tasks to increase participation in basic self-care and decrease assistance level; Patient will increase dynamic sitting balance to good to improve the ability to sit at edge of bed or on a chair for ADLS;  Patient will increase dynamic standing balance to fair+ to improve postural stability and decrease fall risk during standing ADLS and transfers.   LTG Time Frame   (8-14 days)   Long Term Goal Patient will increase standing tolerance to 6 minutes during ADL task to decrease assistance level and decrease fall risk; Patient will tolerate 15 minutes of UE ROM/strengthening to increase general activity tolerance and performance in ADLS/IADLS; Patient will improve functional activity tolerance to 20 minutes of sustained functional tasks to increase participation in basic self-care and decrease assistance level; Patient will increase dynamic standing balance to good to improve postural stability and decrease fall risk during standing ADLS and  transfers.  Pt will score >/= 23/24 on AM-PAC Daily Activity Inpatient scale to promote safe independence with ADLs and functional mobility; Pt will score >/= 100/100 on Barthel Index in order to decrease caregiver assistance needed and increase ability to perform ADLs and functional mobility.   Plan   Treatment Interventions ADL retraining;Functional transfer training;UE strengthening/ROM   OT Frequency 3-5x/wk   Discharge Recommendation   Rehab Resource Intensity Level, OT III (Minimum Resource Intensity)   AM-PAC Daily Activity Inpatient   Lower Body Dressing 3   Bathing 3   Toileting 3   Upper Body Dressing 3   Grooming 3   Eating 4   Daily Activity Raw Score 19   Daily Activity Standardized Score (Calc for Raw Score >=11) 40.22   AM-PAC Applied Cognition Inpatient   Following a Speech/Presentation 4   Understanding Ordinary Conversation 4   Taking Medications 4   Remembering Where Things Are Placed or Put Away 4   Remembering List of 4-5 Errands 4   Taking Care of Complicated Tasks 4   Applied Cognition Raw Score 24   Applied Cognition Standardized Score 62.21   Barthel Index   Feeding 10   Bathing 0   Grooming Score 5   Dressing Score 5   Bladder Score 10   Bowels Score 10   Toilet Use Score 5   Transfers (Bed/Chair) Score 15   Mobility (Level Surface) Score 10   Stairs Score 5   Barthel Index Score 75   Additional Treatment Session   Start Time 1335   End Time 1345   Treatment Assessment Pt completed additional sit to stand supervision. Standing to urinate in urinal supervision. LB dressing completed with set-up/supervision. Pt returned to bed with supervision for sit>supine. Overall pt tolerated session well with minimal fatigue. Pt would benefit from cont OT services to maximize safety and fxl performance of ADLs. Recommend level III minimum resource intensity when medically cleared for d/c.     Cassie Plunkett OTR/L   [place] License # [id number]  [place] License # [id number]

## 2025-05-14 NOTE — PLAN OF CARE
Problem: Potential for Falls  Goal: Patient will remain free of falls  Description: INTERVENTIONS:- Educate patient/family on patient safety including physical limitations- Instruct patient to call for assistance with activity - Consult OT/PT to assist with strengthening/mobility - Keep Call bell within reach- Keep bed low and locked with side rails adjusted as appropriate- Keep care items and personal belongings within reach- Initiate and maintain comfort rounds- Make Fall Risk Sign visible to staff- Offer Toileting ever 3 Hours, in advance of need- Initiate/Maintain bed/chair alarm- Obtain necessary fall risk management equipment: fall sign - Apply yellow socks and bracelet for high fall risk patients- Consider moving patient to room near nurses station  INTERVENTIONS:- Educate patient/family on patient safety including physical limitations- Instruct patient to call for assistance with activity - Consult OT/PT to assist with strengthening/mobility - Keep Call bell within reach- Keep bed low and locked with side rails adjusted as appropriate- Keep care items and personal belongings within reach- Initiate and maintain comfort rounds- Make Fall Risk Sign visible to staff- Offer Toileting every 3 Hours, in advance of need- Initiate/Maintain bed/chair alarm- Obtain necessary fall risk management equipment: fall sign - Apply yellow socks and bracelet for high fall risk patients- Consider moving patient to room near nurses station  Outcome: Progressing     Problem: PAIN - ADULT  Goal: Verbalizes/displays adequate comfort level or baseline comfort level  Description: Interventions:- Encourage patient to monitor pain and request assistance- Assess pain using appropriate pain scale- Administer analgesics based on type and severity of pain and evaluate response- Implement non-pharmacological measures as appropriate and evaluate response- Consider cultural and social influences on pain and pain management- Notify  physician/advanced practitioner if interventions unsuccessful or patient reports new pain  Outcome: Progressing     Problem: SAFETY ADULT  Goal: Patient will remain free of falls  Description: INTERVENTIONS:- Educate patient/family on patient safety including physical limitations- Instruct patient to call for assistance with activity - Consult OT/PT to assist with strengthening/mobility - Keep Call bell within reach- Keep bed low and locked with side rails adjusted as appropriate- Keep care items and personal belongings within reach- Initiate and maintain comfort rounds- Make Fall Risk Sign visible to staff- Offer Toileting every 3 Hours, in advance of need- Initiate/Maintain bed/chair alarm- Obtain necessary fall risk management equipment: fall sign - Apply yellow socks and bracelet for high fall risk patients- Consider moving patient to room near nurses station  INTERVENTIONS:- Educate patient/family on patient safety including physical limitations- Instruct patient to call for assistance with activity - Consult OT/PT to assist with strengthening/mobility - Keep Call bell within reach- Keep bed low and locked with side rails adjusted as appropriate- Keep care items and personal belongings within reach- Initiate and maintain comfort rounds- Make Fall Risk Sign visible to staff- Offer Toileting every 3 Hours, in advance of need- Initiate/Maintain bed/chairalarm- Obtain necessary fall risk management equipment: fall sign - Apply yellow socks and bracelet for high fall risk patients- Consider moving patient to room near nurses station  Outcome: Progressing  Goal: Maintain or return to baseline ADL function  Description: INTERVENTIONS:-  Assess patient's ability to carry out ADLs; assess patient's baseline for ADL function and identify physical deficits which impact ability to perform ADLs (bathing, care of mouth/teeth, toileting, grooming, dressing, etc.)- Assess/evaluate cause of self-care deficits - Assess range of  motion- Assess patient's mobility; develop plan if impaired- Assess patient's need for assistive devices and provide as appropriate- Encourage maximum independence but intervene and supervise when necessary- Involve family in performance of ADLs- Assess for home care needs following discharge - Consider OT consult to assist with ADL evaluation and planning for discharge- Provide patient education as appropriate  Outcome: Progressing  Goal: Maintains/Returns to pre admission functional level  Description: INTERVENTIONS:- Perform AM-PAC 6 Click Basic Mobility/ Daily Activity assessment daily.- Set and communicate daily mobility goal to care team and patient/family/caregiver. - Collaborate with rehabilitation services on mobility goals if consulted- Perform Range of Motion 3 times a day.- Reposition patient every 3 hours.- Dangle patient 3 times a day- Stand patient 3 times a day- Ambulate patient 3 times a day- Out of bed to chair 3 times a day - Out of bed for meals 3 times a day- Out of bed for toileting- Record patient progress and toleration of activity level   Outcome: Progressing     Problem: DISCHARGE PLANNING  Goal: Discharge to home or other facility with appropriate resources  Description: INTERVENTIONS:- Identify barriers to discharge w/patient and caregiver- Arrange for needed discharge resources and transportation as appropriate- Identify discharge learning needs (meds, wound care, etc.)- Arrange for interpretive services to assist at discharge as needed- Refer to Case Management Department for coordinating discharge planning if the patient needs post-hospital services based on physician/advanced practitioner order or complex needs related to functional status, cognitive ability, or social support system  Outcome: Progressing     Problem: Knowledge Deficit  Goal: Patient/family/caregiver demonstrates understanding of disease process, treatment plan, medications, and discharge instructions  Description:  Complete learning assessment and assess knowledge base.Interventions:- Provide teaching at level of understanding- Provide teaching via preferred learning methods  Outcome: Progressing     Problem: CARDIOVASCULAR - ADULT  Goal: Maintains optimal cardiac output and hemodynamic stability  Description: INTERVENTIONS:- Monitor I/O, vital signs and rhythm- Monitor for S/S and trends of decreased cardiac output- Administer and titrate ordered vasoactive medications to optimize hemodynamic stability- Assess quality of pulses, skin color and temperature- Assess for signs of decreased coronary artery perfusion- Instruct patient to report change in severity of symptoms  Outcome: Progressing  Goal: Absence of cardiac dysrhythmias or at baseline rhythm  Description: INTERVENTIONS:- Continuous cardiac monitoring, vital signs, obtain 12 lead EKG if ordered- Administer antiarrhythmic and heart rate control medications as ordered- Monitor electrolytes and administer replacement therapy as ordered  Outcome: Progressing     Problem: RESPIRATORY - ADULT  Goal: Achieves optimal ventilation and oxygenation  Description: INTERVENTIONS:- Assess for changes in respiratory status- Assess for changes in mentation and behavior- Position to facilitate oxygenation and minimize respiratory effort- Oxygen administered by appropriate delivery if ordered- Initiate smoking cessation education as indicated- Encourage broncho-pulmonary hygiene including cough, deep breathe, Incentive Spirometry- Assess the need for suctioning and aspirate as needed- Assess and instruct to report SOB or any respiratory difficulty- Respiratory Therapy support as indicated  Outcome: Progressing     Problem: GENITOURINARY - ADULT  Goal: Maintains or returns to baseline urinary function  Description: INTERVENTIONS:- Assess urinary function- Encourage oral fluids to ensure adequate hydration if ordered- Administer IV fluids as ordered to ensure adequate hydration-  Administer ordered medications as needed- Offer frequent toileting- Follow urinary retention protocol if ordered  Outcome: Progressing  Goal: Absence of urinary retention  Description: INTERVENTIONS:- Assess patient’s ability to void and empty bladder- Monitor I/O- Bladder scan as needed- Discuss with physician/AP medications to alleviate retention as needed- Discuss catheterization for long term situations as appropriate  Outcome: Progressing  Goal: Urinary catheter remains patent  Description: INTERVENTIONS:- Assess patency of urinary catheter- If patient has a chronic parkinson, consider changing catheter if non-functioning- Follow guidelines for intermittent irrigation of non-functioning urinary catheter  Outcome: Progressing     Problem: METABOLIC, FLUID AND ELECTROLYTES - ADULT  Goal: Electrolytes maintained within normal limits  Description: INTERVENTIONS:- Monitor labs and assess patient for signs and symptoms of electrolyte imbalances- Administer electrolyte replacement as ordered- Monitor response to electrolyte replacements, including repeat lab results as appropriate- Instruct patient on fluid and nutrition as appropriate  Outcome: Progressing  Goal: Fluid balance maintained  Description: INTERVENTIONS:- Monitor labs - Monitor I/O and WT- Instruct patient on fluid and nutrition as appropriate- Assess for signs & symptoms of volume excess or deficit  Outcome: Progressing  Goal: Glucose maintained within target range  Description: INTERVENTIONS:- Monitor Blood Glucose as ordered- Assess for signs and symptoms of hyperglycemia and hypoglycemia- Administer ordered medications to maintain glucose within target range- Assess nutritional intake and initiate nutrition service referral as needed  Outcome: Progressing     Problem: SKIN/TISSUE INTEGRITY - ADULT  Goal: Skin Integrity remains intact(Skin Breakdown Prevention)  Description: Assess:-Perform [place] assessment every shift-Clean and moisturize skin every  shift-Inspect skin when repositioning, toileting, and assisting with ADLS-Assess under medical devices such as  every -Assess extremities for adequate circulation and sensation Bed Management:-Have minimal linens on bed & keep smooth, unwrinkled-Change linens as needed when moist or perspiring-Avoid sitting or lying in one position for more than 3 hours while in bed-Keep HOB at 30 degrees Toileting:-Offer bedside commode-Assess for incontinence every shift -Use incontinent care products after each incontinent episode such as barrier cream Activity:-Mobilize patient 3 times a day-Encourage activity and walks on unit-Encourage or provide ROM exercises -Turn and reposition patient every 3 Hours-Use appropriate equipment to lift or move patient in bed-Instruct/ Assist with weight shifting when out of bed in chair-Consider limitation of chair time 3 hour intervalsSkin Care:-Avoid use of baby powder, tape, friction and shearing, hot water or constrictive clothing-Relieve pressure over bony prominences using boots-Do not massage red bony areasNext Steps:-Teach patient strategies to minimize risks such as movement -Consider consults to  interdisciplinary teams such as physical therapy   Outcome: Progressing  Goal: Incision(s), wounds(s) or drain site(s) healing without S/S of infection  Description: INTERVENTIONS- Assess and document dressing, incision, wound bed, drain sites and surrounding tissue- Provide patient and family education- Perform skin care/dressing changes every shift  Outcome: Progressing  Goal: Pressure injury heals and does not worsen  Description: Interventions:- Implement low air loss mattress or specialty surface (Criteria met)- Apply silicone foam dressing- Instruct/assist with weight shifting every 30 minutes when in chair - Limit chair time to 3 hour intervals- Use special pressure reducing interventions such as cushion  when in chair - Apply fecal or urinary incontinence containment device - Perform  passive or active ROM every shift- Turn and reposition patient & offload bony prominences every 3 hours - Utilize friction reducing device or surface for transfers - Consider consults to  interdisciplinary teams such as physical theapy - Use incontinent care products after each incontinent episode such as barrier cream- Consider nutrition services referral as needed  Outcome: Progressing     Problem: HEMATOLOGIC - ADULT  Goal: Maintains hematologic stability  Description: INTERVENTIONS- Assess for signs and symptoms of bleeding or hemorrhage- Monitor labs- Administer supportive blood products/factors as ordered and appropriate  Outcome: Progressing     Problem: MUSCULOSKELETAL - ADULT  Goal: Maintain or return mobility to safest level of function  Description: INTERVENTIONS:- Assess patient's ability to carry out ADLs; assess patient's baseline for ADL function and identify physical deficits which impact ability to perform ADLs (bathing, care of mouth/teeth, toileting, grooming, dressing, etc.)- Assess/evaluate cause of self-care deficits - Assess range of motion- Assess patient's mobility- Assess patient's need for assistive devices and provide as appropriate- Encourage maximum independence but intervene and supervise when necessary- Involve family in performance of ADLs- Assess for home care needs following discharge - Consider OT consult to assist with ADL evaluation and planning for discharge- Provide patient education as appropriate  Outcome: Progressing  Goal: Maintain proper alignment of affected body part  Description: INTERVENTIONS:- Support, maintain and protect limb and body alignment- Provide patient/ family with appropriate education  Outcome: Progressing

## 2025-05-14 NOTE — DISCHARGE INSTR - AVS FIRST PAGE
STOP drinking vodka and other alcoholic products    Try to eat meals every day    Take your medications as directed, and reach out to PCP at least 1 week before running out    Take your Naltrexone for treatment of your alcohol dependence    Go to the alcohol rehab outpatient     Use your oxygen at home, especially when you are exerting yourself      Your goal pulse oximetry is 88 - 92%.  If your pulse ox is <88 - rest for 5 minutes and take deep breaths through your nose with the oxygen in place.  Make sure your finger is warm (cold fingers will give falsely low readings).  After 5 minutes, recheck your pulse ox.  If your pulse ox continues to be below < 88% and you feel short of breath, rest, breathe through your nose and call your primary care physician or pulmonologist 24/7 (if after office hours the answering service will contact the on call physician who will call you back).  If you continue to feel excessively short of breath (much more than your normal breathing pattern), consider further evaluation in the emergency department - remember that a mask must be worn to enter any [facility] Emergency Department.  If your pulse ox continues to be below < 88% and you do not feel short of breath increase your oxygen flow by 1 liter at a time to achieve a reading between 88 and 92%.  If you are needing more than 2 liters higher than your normal flow for more than a few hours call your primary care physician or pulmonologist, even if you are not feeling short of breath.  If your pulse ox is >92% it is safe to turn down oxygen by 1 liter at a time to achieve a reading of 88-92%.

## 2025-05-14 NOTE — DISCHARGE SUMMARY
Discharge Summary - Family Medicine   Name: Gregg Partida 65 y.o. male I MRN: [id number]  Unit/Bed#: 4 [address] I Date of Admission: 5/12/2025   Date of Service: 5/14/2025 I Hospital Day: 1     Assessment & Plan  Alcohol withdrawal (HCC)  Pt arrived after neighbor called 911 due to patient being intoxicated and screaming for an ambulance. Once BLS arrived pt c/o weakness and drinking liter of vodka.   Patient is not sure the last time he took his medications and the last time he ate    ED Course: 1L NS, 5 mg Lopressor inj. X 2, 1mg IV Folic acid, 10 mg Valium inj., 100 mg IV Thiamine, 1L D5NS bolus    Ethanol level: 51    Pt was started on CIWA protocol.  Patient was started IV fluids D5NS at 5 mL an hour.  Patient's symptoms eventually improved    Plan  Continue home Folate, Thiamine  With home naltrexone  Follow-up with PCP  Alcohol cessation counseling encouraged  Atrial fibrillation with rapid ventricular response (HCC)  Patient presented to the ED after being intoxicated and was found to be in Afib w/ RVR.  Received 5 mg IV Lopressor twice in the ED  ED Course: Valium 10 mg IV, NS bolus 1000 ml, dextrose 5% and NS bolus 1000 ml, thiamine 100 mg IV    Patient was monitored on telemetry and continue to be in rate controlled A-fib     Plan:   Continue home Eliquis 5 mg BID  Continue home 150 mg Metoprolol Q12  Continue home 120 mg Cardizem Q12    Fall  Patient has a history of fall, usually after drinking.   Patient fell yesterday after drinking shots of vodka. Endorses hitting his head after fall without LOC.  Denies headache, dizziness, lightheadedness.    CT head: No acute intracranial hemorrhage or depressed calvarial fracture identified   CT C spine: No acute fracture or evidence for traumatic malalignment.     Follow-up with physical therapy outpatient  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 disease (HCC)  Lab Results   Component Value Date    HGBA1C 5.4 02/15/2025       Recent Labs      05/13/25  1400 05/13/25  1559 05/13/25 2000 05/14/25  0716   POCGLU 113 139 139 124       Blood Sugar Average: Last 72 hrs:  (P) 120.9807006756250250  Well-controlled. Patient is on metformin 500 mg daily at home    Plan  Held home metformin  Patient started on insulin sliding scale and Accu-Cheks ACHS  Carbohydrate controlled diet  Hypoglycemia protocol  Monitor blood sugars  Dyslipidemia  Chronic.  Last lipid panel showed total cholesterol of 153, triglycerides of 101, HDL of 46 and LDL of 87    Continue with home Lipitor  History of prostate cancer  Chronic, stable, PMH of prostate CA (2020), s/p resection 2021. Home medication flomax 0.4 mg daily      CAD (coronary artery disease)  Patient has a h/o CAD s/p CABG. Patient is home on Aspirin 81 mg EC tablet daily, Ranolazine 500 mg Q12, and Eliquis 5 mg BID.     Continue home Aspirin 81mg   Continue home Eliquis   Continue home Ranolazine  Nicotine abuse  Chronic stable smoked 1.5 packs x day     Nicotine 21 mg / 24 hour patch  Smoking cessation encouraged   COPD (chronic obstructive pulmonary disease) (HCC)  Chronic stable. Not in acute exacerbation. Home on Albuterol inhaler and Breo Ellipta inhaler.     Continue home inhalers  Monitor oxygen saturation  Monitor respiratory status  QT prolongation  QTC interval of 463 on EKG  EKG: Afib with RVR    Plan:   Avoid QTC prolonging medications  Continue to monitor       Medical Problems       Resolved Problems  Date Reviewed: 12/9/2024   None       Discharging Physician / Practitioner: Nicky Messer DO  PCP: Lilliana Bliss MD  Admission Date:   Admission Orders (From admission, onward)       Ordered        05/13/25 1154  INPATIENT ADMISSION  Once            05/13/25 0719  Place in Observation  Once                          Discharge Date: 05/14/25    Consultations During Hospital Stay:  PT/OT    Procedures Performed:   None    Significant Findings / Test Results:   5/14: Corrected Ca 7.9, Mg 1.5, Hgb 9.4  5/13 :  Anion gap 15, AST 46, ALT 31, , hemoglobin 10.2    CT head: No acute intracranial hemorrhage or depressed calvarial fracture identified   CT C spine: No acute fracture or evidence for traumatic malalignment.     Incidental Findings:   None     Test Results Pending at Discharge (will require follow up):   None     Outpatient Tests Requested:  None    Things to follow up on:   1) Have you stopped/cut down on drinking?  2) Have you been eating every day?  3) Have you been using your home oxygen when you move around?  4) Have you been taking all your medications?  5) Did you go to the rehab for your drinking?    Complications:  None    Reason for Admission: Alcohol Withdrawal     Hospital Course:   Gregg Partida is a 65 y.o. male patient who originally presented to the hospital on 5/12/2025 due to fall after alcohol intoxication.    Patient is a 65-year-old male with past medical history of chronic hypoxic respiratory failure, COPD, CKD, chronic alcohol abuse who presented to the ED after  his neighbor called 911 because he was screaming for an ambulance after falling.  He drank a liter of vodka and was reported to have weakness.  He was placed on CIWA protocol and monitored for alcohol withdrawal.  When he presented to the ED he was found to be in A-fib with RVR and received IV Lopressor.  He was continued on his home metoprolol, Cardizem, Eliquis and monitored with telemetry.     During the admission he returned to baseline and was medically stable for discharge on 05/14/25 with instructions to use his supplemental oxygen at home, and to comply with his medications. Patient states he will go to rehab for his drinking after discharge, and it was recommended he follows through with plan.    The patient, initially admitted to the hospital as inpatient, was discharged earlier than expected given the following: alcohol withdrawal subsiding.  Please see above list of diagnoses and related plan for additional  "information.     Condition at Discharge: good    Discharge Day Visit / Exam:   Subjective:  Patient was evaluated at bedside and found to be stable. Overnight, the patient had no events. Patient states he is feeling his heart beat fast and then slow. He also states his diarrhea has resolved.   Patient denies chest pain, SOB, n/v/d     Vitals: Blood Pressure: 127/75 (05/14/25 0718)  Pulse: 67 (05/14/25 0718)  Temperature: 97.8 °F (36.6 °C) (05/14/25 0718)  Temp Source: Oral (05/13/25 1819)  Respirations: 20 (05/14/25 0718)  Height: 6' 2\" (188 cm) (05/13/25 0812)  Weight - Scale: 79.1 kg (174 lb 4.8 oz) (05/13/25 0812)  SpO2: 96 % (05/14/25 0718)  Physical Exam  Constitutional:       General: He is not in acute distress.     Appearance: Normal appearance. He is normal weight. He is not ill-appearing or toxic-appearing.   HENT:      Head: Normocephalic and atraumatic.      Nose: Nose normal.      Mouth/Throat:      Mouth: Mucous membranes are moist.      Pharynx: Oropharynx is clear.     Eyes:      Conjunctiva/sclera: Conjunctivae normal.      Pupils: Pupils are equal, round, and reactive to light.       Cardiovascular:      Rate and Rhythm: Normal rate and regular rhythm.      Pulses: Normal pulses.      Heart sounds: Normal heart sounds.   Pulmonary:      Effort: Pulmonary effort is normal. No respiratory distress.      Breath sounds: Normal breath sounds.   Abdominal:      General: Abdomen is flat. Bowel sounds are normal.      Palpations: Abdomen is soft.      Tenderness: There is no abdominal tenderness. There is no guarding or rebound.     Musculoskeletal:         General: Normal range of motion.      Cervical back: Normal range of motion and neck supple.      Right lower leg: No edema.      Left lower leg: No edema.     Skin:     General: Skin is warm and dry.     Neurological:      General: No focal deficit present.      Mental Status: He is alert and oriented to person, place, and time. Mental status is at " baseline.     Psychiatric:         Mood and Affect: Mood normal.         Behavior: Behavior normal.         Thought Content: Thought content normal.         Judgment: Judgment normal.          Discussion with Family: Patient declined call to .     Discharge instructions/Information to patient and family:   See after visit summary for information provided to patient and family.      Provisions for Follow-Up Care:  See after visit summary for information related to follow-up care and any pertinent home health orders.      Mobility at time of Discharge:   Basic Mobility Inpatient Raw Score: 17  -HLM Goal: 5: Stand one or more mins  -HL Achieved: 2: Bed activities/Dependent transfer  HLM Goal NOT achieved. Continue to encourage mobility in post discharge setting.     Disposition:   Home    Planned Readmission: Not Likely    Discharge Medications:  See after visit summary for reconciled discharge medications provided to patient and/or family.      Administrative Statements   Discharge Statement:  I have spent a total time of 30 minutes in caring for this patient on the day of the visit/encounter. >30 minutes of time was spent on: Diagnostic results, Risks and benefits of tx options, Instructions for management, Patient and family education, Importance of tx compliance, Risk factor reductions, Impressions, Counseling / Coordination of care, Documenting in the medical record, and Reviewing / ordering tests, medicine, procedures  .

## 2025-05-14 NOTE — PHYSICAL THERAPY NOTE
"       PHYSICAL THERAPY EVALUATION/TREATMENT     05/14/25 1130   PT Last Visit   PT Visit Date 05/14/25   Note Type   Note type Evaluation   Pain Assessment   Pain Assessment Tool 0-10   Pain Score No Pain   Restrictions/Precautions   Other Precautions Fall Risk;Chair Alarm;Bed Alarm   Home Living   Type of Home Apartment   Home Layout One level;Elevator  ([facility])   Home Equipment   (rollator, 02)   Prior Function   Level of [place] Independent with ADLs;Independent with functional mobility   Lives With Alone   Receives Help From Family   Falls in the last 6 months   (multiple)   General   Additional Pertinent History Pt admitted with ETOH withdrawal.  Pt reports he is feeling better today and happy to be OOB and walking.   Family/Caregiver Present No   Cognition   Arousal/Participation Cooperative   Orientation Level Oriented to person;Oriented to place;Oriented to situation   Following Commands Follows one step commands without difficulty   Subjective   Subjective \"I am OK\"   RLE Assessment   RLE Assessment   (ROM WFL, MMT 4+/5 x R ankle DF 2/5)   LLE Assessment   LLE Assessment   (ROM WFL, MMT 4+/5)   Bed Mobility   Supine to Sit 5  Supervision   Transfers   Sit to Stand 5  Supervision   Stand to Sit 5  Supervision   Stand pivot 5  Supervision   Additional items   (with walker)   Ambulation/Elevation   Gait pattern   (compensated R foot drop with steppage gait)   Gait Assistance 5  Supervision   Assistive Device Rolling walker  (3L02)   Distance 300 feet   Balance   Static Sitting Fair +   Static Standing Fair +   Ambulatory Fair   Activity Tolerance   Activity Tolerance Patient tolerated treatment well   Assessment   Prognosis Good   Problem List Decreased strength;Decreased endurance;Impaired balance;Decreased mobility   Assessment Pt is 65 y.o. male seen for PT evaluation s/p [facility] to [facility] on 5/12/2025 w/ Alcohol withdrawal (HCC). PT consulted to assess pt's functional " ----- Message from Gonzalo Verma MD sent at 9/4/2020  6:53 AM CDT -----  Please contact patient    Her labs are normal, they do not show a cause for her thinning hair.  As we discussed the next options are to refer her to dermatology or give her a 1 month trial of the ketoconazole shampoo.  If she would like a referral can be placed.   "mobility and d/c needs. Order placed for PT eval and tx, w/ activity as tolerated order.  Co-morbidities affecting patient's physical performance include: ETOH abuse, afib, noncompliance, fall, nicotine abuse, DM, CKD, COPD.  Personal factors affecting patient at time of initial evaluation include: ambulating with assistive device, inability to navigate level surfaces without external assistance, limited home support, and positive fall history.         Prior to admission, patient was independent with functional mobility with rollator and independent with ADLS.  Upon evaluation: Pt ambulated with a rolling walker with supervision assist and 3L02.          Please find objective findings from Physical Therapy assessment regarding body systems outlined above with impairments and limitations including weakness, impaired balance, gait deviations, and fall risk.The Barthel Index was used as a functional outcome tool presenting with a score of Barthel Index Score: 75 today indicating moderate limitations of functional mobility and ADLS.  Patient's clinical presentation is currently unstable/unpredictable as seen in patient's presentation of increased fall risk, new onset of impairment of functional mobility, and new onset of weakness. Pt would benefit from continued Physical Therapy treatment to address deficits as defined above and maximize level of functional mobility.     As demonstrated by objective findings, the assigned level of complexity for this evaluation is high.The patient's -[facility] Basic Mobility Inpatient Short Form Raw Score is 21. A Raw score of greater than 16 suggests the patient may benefit from discharge to home. Please also refer to the recommendation of the Physical Therapist for safe discharge planning.   Goals   Patient Goals \"be able to walk without the walker\"   [facility] Expiration Date 05/21/25   Short Term Goal #1 1. no falls,   2. modified independent ambulation with a walker 300 feet outdoor surfaces " "so pt can get to the bus   LT Expiration Date 05/28/25   Long Term Goal #1 1. independent up and down 3 steps so pt can get on and off the bus,   2. Pt will begin to ambulate with a cane or without a device indoor level surfaces if safe so pt can ambulate in his apartment without a walker per his goal.   Plan   Treatment/Interventions Gait training;Therapeutic exercise;Elevations;LE strengthening/ROM;Functional transfer training;Spoke to case management;Equipment eval/education;Endurance training   PT Frequency 3-5x/wk   Discharge Recommendation   Rehab Resource Intensity Level, PT III (Minimum Resource Intensity)   AM-PAC Basic Mobility Inpatient   Turning in Flat Bed Without Bedrails 4   Lying on Back to Sitting on Edge of Flat Bed Without Bedrails 4   Moving Bed to Chair 3   Standing Up From Chair Using Arms 4   Walk in Room 3   Climb 3-5 Stairs With Railing 3   Basic Mobility Inpatient Raw Score 21   Basic Mobility Standardized Score 45.55   [facility] Highest Level Of Mobility   -HLM Goal 6: Walk 10 steps or more   -HLM Achieved 8: Walk 250 feet ot more   Barthel Index   Feeding 10   Bathing 0   Grooming Score 5   Dressing Score 5   Bladder Score 10   Bowels Score 10   Toilet Use Score 5   Transfers (Bed/Chair) Score 15   Mobility (Level Surface) Score 10   Stairs Score 5   Barthel Index Score 75   Additional Treatment Session   Start Time 1120   End Time 1130   Treatment Assessment S: \"I can walk again\"   O: Pt agreed to take a second walk.  Pt ambulated with supervision assist x 300 feet with 3 changes in direction with a rolling walker and 3L02.    A:  Sp02 and HR were stable with activity. Gait is steady with a walker.    P: Continue PT to improve indepenence with functional mobility, improve balance and strength.   End of Consult   Patient Position at End of Consult All needs within reach;Bed/Chair alarm activated;Bedside chair   Licensure   [place] License Number  Cassie Trujillojackie PT [id number]     "

## 2025-05-14 NOTE — PLAN OF CARE
Problem: Potential for Falls  Goal: Patient will remain free of falls  Description: INTERVENTIONS:- Educate patient/family on patient safety including physical limitations- Instruct patient to call for assistance with activity - Consult OT/PT to assist with strengthening/mobility - Keep Call bell within reach- Keep bed low and locked with side rails adjusted as appropriate- Keep care items and personal belongings within reach- Initiate and maintain comfort rounds- Make Fall Risk Sign visible to staff- Offer Toileting every  Hours, in advance of need- Initiate/Maintain alarm- Obtain necessary fall risk management equipment: - Apply yellow socks and bracelet for high fall risk patients- Consider moving patient to room near nurses station  INTERVENTIONS:- Educate patient/family on patient safety including physical limitations- Instruct patient to call for assistance with activity - Consult OT/PT to assist with strengthening/mobility - Keep Call bell within reach- Keep bed low and locked with side rails adjusted as appropriate- Keep care items and personal belongings within reach- Initiate and maintain comfort rounds- Make Fall Risk Sign visible to staff- Offer Toileting every  Hours, in advance of need- Initiate/Maintain alarm- Obtain necessary fall risk management equipment: - Apply yellow socks and bracelet for high fall risk patients- Consider moving patient to room near nurses station  Outcome: Progressing     Problem: PAIN - ADULT  Goal: Verbalizes/displays adequate comfort level or baseline comfort level  Description: Interventions:- Encourage patient to monitor pain and request assistance- Assess pain using appropriate pain scale- Administer analgesics based on type and severity of pain and evaluate response- Implement non-pharmacological measures as appropriate and evaluate response- Consider cultural and social influences on pain and pain management- Notify physician/advanced practitioner if interventions  unsuccessful or patient reports new pain  Outcome: Progressing     Problem: SAFETY ADULT  Goal: Patient will remain free of falls  Description: INTERVENTIONS:- Educate patient/family on patient safety including physical limitations- Instruct patient to call for assistance with activity - Consult OT/PT to assist with strengthening/mobility - Keep Call bell within reach- Keep bed low and locked with side rails adjusted as appropriate- Keep care items and personal belongings within reach- Initiate and maintain comfort rounds- Make Fall Risk Sign visible to staff- Offer Toileting every  Hours, in advance of need- Initiate/Maintain alarm- Obtain necessary fall risk management equipment: - Apply yellow socks and bracelet for high fall risk patients- Consider moving patient to room near nurses station  INTERVENTIONS:- Educate patient/family on patient safety including physical limitations- Instruct patient to call for assistance with activity - Consult OT/PT to assist with strengthening/mobility - Keep Call bell within reach- Keep bed low and locked with side rails adjusted as appropriate- Keep care items and personal belongings within reach- Initiate and maintain comfort rounds- Make Fall Risk Sign visible to staff- Offer Toileting every  Hours, in advance of need- Initiate/Maintain alarm- Obtain necessary fall risk management equipment: - Apply yellow socks and bracelet for high fall risk patients- Consider moving patient to room near nurses station  Outcome: Progressing  Goal: Maintain or return to baseline ADL function  Description: INTERVENTIONS:-  Assess patient's ability to carry out ADLs; assess patient's baseline for ADL function and identify physical deficits which impact ability to perform ADLs (bathing, care of mouth/teeth, toileting, grooming, dressing, etc.)- Assess/evaluate cause of self-care deficits - Assess range of motion- Assess patient's mobility; develop plan if impaired- Assess patient's need for  assistive devices and provide as appropriate- Encourage maximum independence but intervene and supervise when necessary- Involve family in performance of ADLs- Assess for home care needs following discharge - Consider OT consult to assist with ADL evaluation and planning for discharge- Provide patient education as appropriate  Outcome: Progressing  Goal: Maintains/Returns to pre admission functional level  Description: INTERVENTIONS:- Perform AM-PAC 6 Click Basic Mobility/ Daily Activity assessment daily.- Set and communicate daily mobility goal to care team and patient/family/caregiver. - Collaborate with rehabilitation services on mobility goals if consulted- Perform Range of Motion  times a day.- Reposition patient every  hours.- Dangle patient  times a day- Stand patient  times a day- Ambulate patient  times a day- Out of bed to chair  times a day - Out of bed for meals  times a day- Out of bed for toileting- Record patient progress and toleration of activity level   Outcome: Progressing     Problem: DISCHARGE PLANNING  Goal: Discharge to home or other facility with appropriate resources  Description: INTERVENTIONS:- Identify barriers to discharge w/patient and caregiver- Arrange for needed discharge resources and transportation as appropriate- Identify discharge learning needs (meds, wound care, etc.)- Arrange for interpretive services to assist at discharge as needed- Refer to Case Management Department for coordinating discharge planning if the patient needs post-hospital services based on physician/advanced practitioner order or complex needs related to functional status, cognitive ability, or social support system  Outcome: Progressing     Problem: Knowledge Deficit  Goal: Patient/family/caregiver demonstrates understanding of disease process, treatment plan, medications, and discharge instructions  Description: Complete learning assessment and assess knowledge base.Interventions:- Provide teaching at level  of understanding- Provide teaching via preferred learning methods  Outcome: Progressing     Problem: CARDIOVASCULAR - ADULT  Goal: Maintains optimal cardiac output and hemodynamic stability  Description: INTERVENTIONS:- Monitor I/O, vital signs and rhythm- Monitor for S/S and trends of decreased cardiac output- Administer and titrate ordered vasoactive medications to optimize hemodynamic stability- Assess quality of pulses, skin color and temperature- Assess for signs of decreased coronary artery perfusion- Instruct patient to report change in severity of symptoms  Outcome: Progressing  Goal: Absence of cardiac dysrhythmias or at baseline rhythm  Description: INTERVENTIONS:- Continuous cardiac monitoring, vital signs, obtain 12 lead EKG if ordered- Administer antiarrhythmic and heart rate control medications as ordered- Monitor electrolytes and administer replacement therapy as ordered  Outcome: Progressing     Problem: RESPIRATORY - ADULT  Goal: Achieves optimal ventilation and oxygenation  Description: INTERVENTIONS:- Assess for changes in respiratory status- Assess for changes in mentation and behavior- Position to facilitate oxygenation and minimize respiratory effort- Oxygen administered by appropriate delivery if ordered- Initiate smoking cessation education as indicated- Encourage broncho-pulmonary hygiene including cough, deep breathe, Incentive Spirometry- Assess the need for suctioning and aspirate as needed- Assess and instruct to report SOB or any respiratory difficulty- Respiratory Therapy support as indicated  Outcome: Progressing     Problem: GENITOURINARY - ADULT  Goal: Maintains or returns to baseline urinary function  Description: INTERVENTIONS:- Assess urinary function- Encourage oral fluids to ensure adequate hydration if ordered- Administer IV fluids as ordered to ensure adequate hydration- Administer ordered medications as needed- Offer frequent toileting- Follow urinary retention protocol if  ordered  Outcome: Progressing  Goal: Absence of urinary retention  Description: INTERVENTIONS:- Assess patient’s ability to void and empty bladder- Monitor I/O- Bladder scan as needed- Discuss with physician/AP medications to alleviate retention as needed- Discuss catheterization for long term situations as appropriate  Outcome: Progressing  Goal: Urinary catheter remains patent  Description: INTERVENTIONS:- Assess patency of urinary catheter- If patient has a chronic parkinson, consider changing catheter if non-functioning- Follow guidelines for intermittent irrigation of non-functioning urinary catheter  Outcome: Progressing     Problem: METABOLIC, FLUID AND ELECTROLYTES - ADULT  Goal: Electrolytes maintained within normal limits  Description: INTERVENTIONS:- Monitor labs and assess patient for signs and symptoms of electrolyte imbalances- Administer electrolyte replacement as ordered- Monitor response to electrolyte replacements, including repeat lab results as appropriate- Instruct patient on fluid and nutrition as appropriate  Outcome: Progressing  Goal: Fluid balance maintained  Description: INTERVENTIONS:- Monitor labs - Monitor I/O and WT- Instruct patient on fluid and nutrition as appropriate- Assess for signs & symptoms of volume excess or deficit  Outcome: Progressing  Goal: Glucose maintained within target range  Description: INTERVENTIONS:- Monitor Blood Glucose as ordered- Assess for signs and symptoms of hyperglycemia and hypoglycemia- Administer ordered medications to maintain glucose within target range- Assess nutritional intake and initiate nutrition service referral as needed  Outcome: Progressing     Problem: SKIN/TISSUE INTEGRITY - ADULT  Goal: Skin Integrity remains intact(Skin Breakdown Prevention)  Description: Assess:-Perform Herrera assessment every Clean and moisturize skin every -Inspect skin when repositioning, toileting, and assisting with ADLS-Assess under medical devices such as  every  -Assess extremities for adequate circulation and sensation Bed Management:-Have minimal linens on bed & keep smooth, unwrinkled-Change linens as needed when moist or perspiring-Avoid sitting or lying in one position for more than  hours while in bed-Keep HOB at degrees Toileting:-Offer bedside commode-Assess for incontinence every -Use incontinent care products after each incontinent episode such as Activity:-Mobilize patient  times a day-Encourage activity and walks on unit-Encourage or provide ROM exercises -Turn and reposition patient every  Hours-Use appropriate equipment to lift or move patient in bed-Instruct/ Assist with weight shifting every  when out of bed in chair-Consider limitation of chair time  hour intervalsSkin Care:-Avoid use of baby powder, tape, friction and shearing, hot water or constrictive clothing-Relieve pressure over bony prominences using -Do not massage red bony areasNext Steps:-Teach patient strategies to minimize risks such as  -Consider consults to  interdisciplinary teams such as   Outcome: Progressing  Goal: Incision(s), wounds(s) or drain site(s) healing without S/S of infection  Description: INTERVENTIONS- Assess and document dressing, incision, wound bed, drain sites and surrounding tissue- Provide patient and family education- Perform skin care/dressing changes every   Outcome: Progressing  Goal: Pressure injury heals and does not worsen  Description: Interventions:- Implement low air loss mattress or specialty surface (Criteria met)- Apply silicone foam dressing- Instruct/assist with weight shifting every  minutes when in chair - Limit chair time to  hour intervals- Use special pressure reducing interventions such as  when in chair - Apply fecal or urinary incontinence containment device - Perform passive or active ROM every - Turn and reposition patient & offload bony prominences every  hours - Utilize friction reducing device or surface for transfers - Consider consults to   interdisciplinary teams such as - Use incontinent care products after each incontinent episode such as  Consider nutrition services referral as needed  Outcome: Progressing     Problem: HEMATOLOGIC - ADULT  Goal: Maintains hematologic stability  Description: INTERVENTIONS- Assess for signs and symptoms of bleeding or hemorrhage- Monitor labs- Administer supportive blood products/factors as ordered and appropriate  Outcome: Progressing     Problem: MUSCULOSKELETAL - ADULT  Goal: Maintain or return mobility to safest level of function  Description: INTERVENTIONS:- Assess patient's ability to carry out ADLs; assess patient's baseline for ADL function and identify physical deficits which impact ability to perform ADLs (bathing, care of mouth/teeth, toileting, grooming, dressing, etc.)- Assess/evaluate cause of self-care deficits - Assess range of motion- Assess patient's mobility- Assess patient's need for assistive devices and provide as appropriate- Encourage maximum independence but intervene and supervise when necessary- Involve family in performance of ADLs- Assess for home care needs following discharge - Consider OT consult to assist with ADL evaluation and planning for discharge- Provide patient education as appropriate  Outcome: Progressing  Goal: Maintain proper alignment of affected body part  Description: INTERVENTIONS:- Support, maintain and protect limb and body alignment- Provide patient/ family with appropriate education  Outcome: Progressing

## 2025-05-14 NOTE — UTILIZATION REVIEW
Continued Stay Review    Date: 5-14-25   Day 3: Has surpassed a 2nd midnight with active treatments and services.                             Current Patient Class: Inpatient Current Level of Care: med surg    HPI:65 y.o. male initially admitted on  5-12-25      Current Diagnosis:    Alcohol withdrawal   AFIB with RVR  Fall     PMHX: COPD, CKD, ALCOHOL ABUSE, CHRONIC RESPIRATORY FAILURE    Assessment/Plan:     Mag 2.3> 1.5.   Serial Ciwa assessments overnight 4>3>2.  Hypertension, agitation and headache  resolved.  Started cardizem 120 mg q12 hr  and metoprolol 150 mg on 5-13 with improvement in heart rate today. Remained on po folic acid and po thiamine.  Received iv Mag x 1 at 0100.    Medications:   Scheduled Medications:  aluminum-magnesium hydroxide-simethicone, 30 mL, Oral, 4x Daily (AC & HS)  apixaban, 5 mg, Oral, BID  aspirin, 81 mg, Oral, Daily  atorvastatin, 40 mg, Oral, Daily With Dinner  diltiazem, 120 mg, Oral, Q12H SEDA  ferrous sulfate, 325 mg, Oral, Daily With Breakfast  fluticasone-vilanterol, 1 puff, Inhalation, Daily  folic acid, 1,000 mcg, Oral, Daily  gabapentin, 300 mg, Oral, TID  insulin lispro, 4-20 Units, Subcutaneous, 4x Daily (AC & HS)  insulin lispro, 4-20 Units, Subcutaneous, HS  lidocaine, 1 patch, Topical, Daily  magnesium Oxide, 800 mg, Oral, BID  magnesium sulfate, 4 g, Intravenous, Once  metoprolol succinate, 150 mg, Oral, Q12H  nicotine, 1 patch, Transdermal, Daily  pantoprazole, 40 mg, Oral, Daily  pyridoxine, 100 mg, Oral, Daily  ranolazine, 500 mg, Oral, Q12H  saccharomyces boulardii, 250 mg, Oral, BID  tamsulosin, 0.4 mg, Oral, Daily  vitamin B-1, 100 mg, Oral, Daily      Continuous IV Infusions:     PRN Meds:  albuterol, 2 puff, Inhalation, Q4H PRN  aluminum-magnesium hydroxide-simethicone, 30 mL, Oral, Q4H PRN  [Held by provider] senna-docusate sodium, 1 tablet, Oral, Daily PRN      Discharge Plan: 5-14-25 discharged to home.    Summary   EMS reports  his neighbor called  [phone] because he was screaming for an ambulance after falling.  He drank a liter of vodka and was reported to have weakness.  He was placed on CIWA protocol and monitored for alcohol withdrawal.  When he presented to the ED he was found to be in A-fib with RVR and received IV Lopressor.  He was continued on his home metoprolol, Cardizem, Eliquis and monitored with telemetry.      During the admission he returned to baseline and was medically stable for discharge on 05/14/25 with instructions to use his supplemental oxygen at home, and to comply with his medications. Patient states he will go to rehab for his drinking after discharge, and it was recommended he follows through with plan.       Vital Signs (last 3 days)       Date/Time Temp Pulse Resp BP MAP  SpO2 Nasal Cannula O2 Flow Rate (L/min) [place] Coma Scale Score CIWA-Ar Total Pain    05/14/25 1130 -- -- -- -- -- -- -- -- -- No Pain    05/14/25 0800 97.8 °F (36.6 °C) -- -- -- -- -- -- 15 -- No Pain    05/14/25 07:18:04 97.8 °F (36.6 °C) 67 20 127/75 92 96 % -- -- -- --    05/14/25 05:14:31 -- 59 -- 111/62 78 94 % -- -- 2 --    05/14/25 02:03:56 98.7 °F (37.1 °C) 65 18 117/66 83 98 % -- -- 3 --    05/13/25 22:07:22 -- 89 18 113/70 84 95 % -- -- -- --    05/13/25 21:31:01 -- 82 -- 144/75 98 98 % -- -- 4 --    05/13/25 2100 -- -- -- -- -- 97 % 3 L/min -- -- No Pain    05/13/25 1819 99 °F (37.2 °C) 107 19 143/84 104 97 % 3 L/min -- 6 --    05/13/25 18:18:46 99 °F (37.2 °C) 96 -- 143/84 104 97 % -- -- -- --    05/13/25 18:09:21 -- 94 20 144/88 107 98 % -- -- -- --    05/13/25 1708 -- -- -- -- -- -- -- -- -- 7 05/13/25 1642 99.6 °F (37.6 °C) 100 19 172/107 129 97 % 3 L/min -- 9 7    05/13/25 16:41:47 99.6 °F (37.6 °C) 114 -- 172/107 129 98 % -- -- -- --    05/13/25 16:40:47 99.6 °F (37.6 °C) 109 -- 172/107 129 97 % -- -- -- --    05/13/25 1519 98.8 °F (37.1 °C) 109 -- 172/106 128 98 % -- -- -- --    05/13/25 15:18:42 98.8 °F (37.1 °C) 109 20 172/106 128 98 % -- --  -- --    05/13/25 1230 -- 99 -- -- -- -- -- -- 3 --    05/13/25 1201 -- -- -- -- -- -- 3 L/min 15 -- --    05/13/25 11:22:53 99.3 °F (37.4 °C) 87 -- 163/95 118 97 % -- -- 9 --    05/13/25 0812 97.4 °F (36.3 °C) 100 20 166/95 119 99 % 3 L/min -- -- No Pain    05/13/25 08:11:08 97.4 °F (36.3 °C) 101 -- 166/95 119 98 % -- -- -- --    05/13/25 08:09:55 97.4 °F (36.3 °C) 100 -- 166/95 119 98 % -- -- -- --    05/13/25 0745 -- 106 16 157/79 113 99 % -- -- -- --    05/13/25 0730 -- 106 20 143/74 104 98 % -- -- -- --    05/13/25 0715 -- 103 17 156/76 108 99 % -- -- -- --    05/13/25 0645 -- 104 25 159/91 117 95 % -- -- -- --    05/13/25 0630 98.2 °F (36.8 °C) 108 18 148/96 116 95 % -- -- -- --    05/13/25 0600 -- 128 16 183/111 138 92 % -- -- -- --    05/13/25 0545 -- 139 23 191/97 136 -- -- -- 8 --    05/13/25 0500 -- 113 17 171/99 127 93 % -- -- -- --    05/13/25 0315 -- 98 14 151/88 113 91 % -- -- -- --    05/13/25 0300 -- 122 17 181/102 135 93 % -- -- -- --    05/13/25 0200 -- 106 15 162/81 116 92 % -- -- -- --    05/13/25 0130 -- 94 20 127/71 92 92 % -- -- -- --    05/13/25 0100 -- 96 21 142/75 102 94 % -- -- -- --    05/13/25 0051 -- -- -- -- -- -- -- 15 -- --    05/13/25 0000 -- 102 23 117/62 82 92 % -- -- -- --    05/12/25 2300 -- 107 21 128/60 86 91 % -- -- -- --    05/12/25 2230 -- 105 20 124/68 90 94 % -- -- -- --    05/12/25 2215 -- 114 16 129/72 95 92 % -- -- -- --       Weight (last 2 days)       Date/Time Weight    05/13/25 0812 79.1 (174.3)           CIWA-Ar Score       Row Name 05/14/25 05:14:31 05/14/25 02:03:56 05/13/25 21:31:01       CIWA-Ar    Nausea and Vomiting 0 0 0    Tactile Disturbances 0 0 0    Tremor 2 2 2    Auditory Disturbances 0 0 0    Paroxysmal Sweats 0 0 0    Visual Disturbances 0 0 0    Anxiety 0 0 0    Headache, Fullness in Head 0 1 1    Agitation 0 0 1    Orientation and Clouding of Sensorium 0 0 0    CIWA-Ar Total 2 3 4      Row Name 05/13/25 1819 05/13/25 1642 05/13/25 1230        CIWA-Ar    Pulse -- -- 99    Nausea and Vomiting 0 0 0    Tactile Disturbances 0 0 0    Tremor 3 5 2    Auditory Disturbances 0 0 0    Paroxysmal Sweats 0 0 0    Visual Disturbances 0 0 0    Anxiety 0 1 1    Headache, Fullness in Head 3 3 0    Agitation 0 0 0    Orientation and Clouding of Sensorium 0 0 0    CIWA-Ar Total 6 9 3      Row Name 05/13/25 11:22:53 05/13/25 0545          CIWA-Ar    Nausea and Vomiting 0 1     Tactile Disturbances 0 0     Tremor 3 2     Auditory Disturbances 0 0     Paroxysmal Sweats 0 0     Visual Disturbances 0 0     Anxiety 2 3     Headache, Fullness in Head 1 2     Agitation 3 0     Orientation and Clouding of Sensorium 0 0     CIWA-Ar Total 9 8                   Pertinent Labs/Diagnostic Results:   Radiology:  CT head wo contrast   Final Interpretation by Chris Lugo MD (05/13 7240)      No interval change. No acute intracranial hemorrhage or depressed calvarial fracture identified                  Workstation performed: [id number]         CT spine cervical wo contrast   Final Interpretation by Chris Lugo MD (05/13 7981)      No interval change. No acute fracture or evidence for traumatic malalignment.                  Workstation performed: [id number]           Cardiology:  ECG 12 lead   Final Result by Carley Hernandez MD (05/14 0937)   Atrial fibrillation with rapid ventricular response   Rightward axis   ST & T wave abnormality, consider inferolateral ischemia   Abnormal ECG   When compared with ECG of 13-May-2025 00:46, (unconfirmed)   No significant change was found   Confirmed by Carley Hernandez ([id number]) on 5/14/2025 9:37:28 AM      ECG 12 lead   Final Result by Carley Hernandez MD (05/14 0936)   Atrial fibrillation with rapid ventricular response   Rightward axis   ST & T wave abnormality, consider inferior ischemia   ST & T wave abnormality, consider anterolateral ischemia   Abnormal ECG   When compared with ECG of 12-May-2025 21:25, (unconfirmed)   No significant change was found    Confirmed by Carley Hernandez ([id number]) on 5/14/2025 9:36:33 AM      ECG 12 lead   Final Result by Carley Hernandez MD (05/14 0931)   Atrial fibrillation with rapid ventricular response   ST & T wave abnormality, consider inferior ischemia   ST & T wave abnormality, consider anterolateral ischemia   Abnormal ECG   When compared with ECG of 02-May-2025 16:38,   Vent. rate has increased by  35 bpm   T wave inversion now evident in Inferior leads   T wave inversion now evident in Anterolateral leads   Confirmed by Carley Hernandez ([id number]) on 5/14/2025 9:31:09 AM        GI:  No orders to display           Results from last 7 days   Lab Units 05/14/25  0514 05/13/25  1102 05/12/25  2204   WBC Thousand/uL 6.55 5.49 4.82   HEMOGLOBIN g/dL 9.4* 10.2* 10.3*   HEMATOCRIT % 30.9* 32.3* 31.8*   PLATELETS Thousands/uL 157 200 207   TOTAL NEUT ABS Thousands/µL 3.94 3.07 2.43         Results from last 7 days   Lab Units 05/14/25  1105 05/14/25  0514 05/13/25  1102 05/12/25  2204   SODIUM mmol/L  --  135 140 140   POTASSIUM mmol/L  --  3.8 3.5 4.8   CHLORIDE mmol/L  --  99 103 102   CO2 mmol/L  --  25 22 24   ANION GAP mmol/L  --  11 15* 14*   BUN mg/dL  --  14 8 9   CREATININE mg/dL  --  0.85 0.70 0.80   EGFR ml/min/1.73sq m  --  92 100 95   CALCIUM mg/dL  --  7.0* 7.0* 7.2*   CALCIUM, IONIZED mmol/L 0.93*  --   --   --    MAGNESIUM mg/dL  --  1.5*  --   --      Results from last 7 days   Lab Units 05/14/25  0514 05/13/25  1102   AST U/L 39 46*   ALT U/L 27 31   ALK PHOS U/L 106* 118*   TOTAL PROTEIN g/dL 5.7* 6.0*   ALBUMIN g/dL 2.9* 2.9*   TOTAL BILIRUBIN mg/dL 0.78 0.62     Results from last 7 days   Lab Units 05/14/25  1135 05/14/25  0716 05/13/25  2000 05/13/25  1559 05/13/25  1400 05/13/25  1210 05/13/25  1051   POC GLUCOSE mg/dl 120 124 139 139 113 121 85     Results from last 7 days   Lab Units 05/14/25  0514 05/13/25  1102 05/12/25  2204   GLUCOSE RANDOM mg/dL 123 78 80       Results from last 7 days   Lab Units 05/13/25  0005  05/12/25  2212   HS TNI 0HR ng/L  --  28   HS TNI 2HR ng/L 25  --    HSTNI D2 ng/L -3  --      Results from last 7 days   Lab Units 05/13/25  1102   PROTIME seconds 13.7   INR  1.00       Results from last 7 days   Lab Units 05/13/25  1102   ETHANOL LVL mg/dL 51*         Network Utilization Review Department  ATTENTION: Please call with any questions or concerns to [phone] and carefully listen to the prompts so that you are directed to the right person. All voicemails are confidential.   For Discharge needs, contact Care Management DC Support Team at [phone] opt. 2  Send all requests for admission clinical reviews, approved or denied determinations and any other requests to dedicated fax number below belonging to the campus where the patient is receiving treatment. List of dedicated fax numbers for the Facilities:  FACILITY NAME UR FAX NUMBER   ADMISSION DENIALS (Administrative/Medical Necessity) [phone]   DISCHARGE SUPPORT TEAM (NETWORK) [phone]   PARENT CHILD HEALTH (Maternity/NICU/Pediatrics) [phone]   General acute hospital [phone]   [facility] [phone]   [facility] [phone]   [facility] [phone]   [facility] [phone]   [facility] [phone]   [facility] [phone]   [facility] [phone]   [facility] [phone]   [facility] [phone]   [facility] [phone]   [facility] [phone]

## 2025-05-14 NOTE — ASSESSMENT & PLAN NOTE
Patient has a history of fall, usually after drinking.   Patient fell yesterday after drinking shots of vodka. Endorses hitting his head after fall without LOC.  Denies headache, dizziness, lightheadedness.    CT head: No acute intracranial hemorrhage or depressed calvarial fracture identified   CT C spine: No acute fracture or evidence for traumatic malalignment.     Follow-up with physical therapy outpatient

## 2025-05-14 NOTE — NURSING NOTE
Pt refused the need for oxygen. And refused the need for a [facility] ride, he was discharged via a lyft ride.

## 2025-05-14 NOTE — CASE MANAGEMENT
Case Management Discharge Planning Note    Patient name Gregg Partida  Location [address] [place] [address]/[address] [place] [address]-* MRN [id number]  : 1960 Date 2025       Current Admission Date: 2025  Current Admission Diagnosis:Alcohol withdrawal ([facility])   Patient Active Problem List    Diagnosis Date Noted    QT prolongation 2025    Fall 2025    Aspiration pneumonia of both lower lobes ([facility]) 2025    Alcohol use disorder 2025    CAD (coronary artery disease) 2025    A-fib ([facility]) 2025    COPD (chronic obstructive pulmonary disease) ([facility]) 2025    Tobacco abuse 2025    CKD (chronic kidney disease) stage 2, GFR 60-89 ml/min 2025    Heart failure with preserved ejection fraction ([facility]) 2025    SOB (shortness of breath) 2025    Wound of right buttock 2025    Type 2 diabetes mellitus, without long-term current use of insulin ([facility]) 2025    Elevated serum creatinine 2025    Hypomagnesemia 2025    Open wound of buttock 2025    Fracture of multiple ribs 2025    Atrial fibrillation with rapid ventricular response ([facility]) 2025    Dyspnea on exertion 2025    Smoking 2024    Head injury 2024    Hypotension 2024    Mucus plugging of bronchi 2024    Acute respiratory failure with hypoxia ([facility]) 2024    Hyponatremia 2024    Closed fracture of one rib of left side 2024    Ambulatory dysfunction 2024    Financial insecurity 2024    BPH (benign prostatic hyperplasia) 2024    Chronic alcohol use 2024    Hypertension 2024    Hemorrhagic cystitis 2024    Hypothyroidism 2024    Gastrointestinal hemorrhage with melena 10/29/2023    Chest pain 2023    Longstanding persistent atrial fibrillation (HCC) 2023    GERD (gastroesophageal reflux disease) 2023    Stable angina (HCC) 2022    Stage 3a chronic kidney disease (HCC) 2022     Fatty liver, alcoholic 04/15/2022    Nonischemic nontraumatic myocardial injury 04/10/2022    Mild protein-calorie malnutrition (HCC) 07/23/2021    Prostate cancer (HCC) 07/23/2021    Atrial fibrillation (HCC) 12/05/2020    Chronic heart failure with preserved ejection fraction (HCC) 12/22/2019    Noncompliance 12/22/2019    ETOH abuse 11/23/2019    Generalized weakness 10/17/2019    Cervical spinal stenosis 10/17/2019    Alcohol withdrawal (HCC) 06/07/2019    History of prostate cancer 06/07/2019    CAD (coronary artery disease) 06/07/2019    Nicotine abuse 06/07/2019    Depression, recurrent (HCC) 10/10/2018    Hx of CABG 10/10/2018    Dyslipidemia 10/10/2018    COPD, severity to be determined ([facility]) 10/09/2018    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 disease (HCC) 10/09/2018    Hypertensive heart and chronic kidney disease with heart failure and stage 1 through stage 4 chronic kidney disease, or chronic kidney disease (HCC) 10/09/2018      LOS (days): 1  Geometric Mean LOS (GMLOS) (days): 2.4  Days to GMLOS:1.3     OBJECTIVE:  Risk of Unplanned Readmission Score: 78.46         Current admission status: Inpatient   Preferred Pharmacy:   [place] [address]  Phone: [phone] Fax: [phone]    UNKNOWN - FOLLOW UP PRIOR TO DISCHARGE TO E-PRESCRIBE  No address on file      Primary Care Provider: Lilliana Bliss MD    Primary Insurance: [employer] REP  Secondary Insurance:     DISCHARGE DETAILS:    Treatment Team Recommendation: Home  Discharge Destination Plan:: Home  Transport at Discharge : [facility]     Number/Name of Dispatcher: [facility]  Transported by (Company and Unit #): [facility]  ETA of Transport (Date): 05/14/25  ETA of Transport (Time): 2000    Update: [facility] notified by unit nurse patient is now refusing transport mode w/ oxygen. Patient demanding to leave via Lyft without oxygen. Attending notified. S  canceled. Lyft scheduled.

## 2025-05-14 NOTE — HOSPITAL COURSE
Patient is a 65-year-old male with past medical history of chronic hypoxic respiratory failure, COPD, CKD, chronic alcohol abuse who presented to the ED after  his neighbor called [phone] because he was screaming for an ambulance after falling.  He drank a liter of vodka and was reported to have weakness.  He was placed on CIWA protocol and monitored for alcohol withdrawal.  When he presented to the ED he was found to be in A-fib with RVR and received IV Lopressor.  He was continued on his home metoprolol, Cardizem, Eliquis and monitored with telemetry.     During the admission he returned to baseline and was medically stable for discharge on 05/14/25 with instructions to use his supplemental oxygen at home, and to comply with his medications. Patient states he will go to rehab for his drinking after discharge, and it was recommended he follows through with plan.

## 2025-05-14 NOTE — PLAN OF CARE
Problem: PHYSICAL THERAPY ADULT  Goal: Performs mobility at highest level of function for planned discharge setting.  See evaluation for individualized goals.  Description: Treatment/Interventions: Gait training, Therapeutic exercise, Elevations, LE strengthening/ROM, Functional transfer training, Spoke to case management, Equipment eval/education, Endurance training          See flowsheet documentation for full assessment, interventions and recommendations.  Note: Prognosis: Good  Problem List: Decreased strength, Decreased endurance, Impaired balance, Decreased mobility  Assessment: Pt is 65 y.o. male seen for PT evaluation s/p admit to [facility] on 5/12/2025 w/ Alcohol withdrawal (HCC). PT consulted to assess pt's functional mobility and d/c needs. Order placed for PT eval and tx, w/ activity as tolerated order.  Co-morbidities affecting patient's physical performance include: ETOH abuse, afib, noncompliance, fall, nicotine abuse, DM, CKD, COPD.  Personal factors affecting patient at time of initial evaluation include: ambulating with assistive device, inability to navigate level surfaces without external assistance, limited home support, and positive fall history.     Prior to admission, patient was independent with functional mobility with rollator and independent with ADLS.  Upon evaluation: Pt ambulated with a rolling walker with supervision assist and 3L02.      Please find objective findings from Physical Therapy assessment regarding body systems outlined above with impairments and limitations including weakness, impaired balance, gait deviations, and fall risk.The Barthel Index was used as a functional outcome tool presenting with a score of Barthel Index Score: 75 today indicating moderate limitations of functional mobility and ADLS.  Patient's clinical presentation is currently unstable/unpredictable as seen in patient's presentation of increased fall risk, new onset of impairment of functional  mobility, and new onset of weakness. Pt would benefit from continued Physical Therapy treatment to address deficits as defined above and maximize level of functional mobility.     As demonstrated by objective findings, the assigned level of complexity for this evaluation is high.The patient's AM-[facility] Basic Mobility Inpatient Short Form Raw Score is 21. A Raw score of greater than 16 suggests the patient may benefit from discharge to home. Please also refer to the recommendation of the Physical Therapist for safe discharge planning.        Rehab Resource Intensity Level, PT: III (Minimum Resource Intensity)    See flowsheet documentation for full assessment.

## 2025-05-15 ENCOUNTER — PATIENT OUTREACH (OUTPATIENT)
Age: 65
End: 2025-05-15

## 2025-05-15 DIAGNOSIS — F10.20 ALCOHOLISM (HCC): Primary | ICD-10-CM

## 2025-05-15 NOTE — PROGRESS NOTES
Referral received. Pt discharged from inpatient stay on 5/14. Pt is known to this RN CM.     Outreach to patient. LVM requesting return call. RN CM contact information provided.     Referral placed for [facility] CM to assist with alcoholism.     Note routed to M[facility] CM.

## 2025-05-16 ENCOUNTER — TRANSITIONAL CARE MANAGEMENT (OUTPATIENT)
Age: 65
End: 2025-05-16

## 2025-05-20 ENCOUNTER — APPOINTMENT (EMERGENCY)
Dept: RADIOLOGY | Facility: HOSPITAL | Age: 65
End: 2025-05-20
Payer: COMMERCIAL

## 2025-05-20 ENCOUNTER — HOSPITAL ENCOUNTER (EMERGENCY)
Facility: HOSPITAL | Age: 65
Discharge: HOME | End: 2025-05-21
Attending: EMERGENCY MEDICINE
Payer: COMMERCIAL

## 2025-05-20 VITALS
HEART RATE: 89 BPM | OXYGEN SATURATION: 95 % | TEMPERATURE: 97.7 F | DIASTOLIC BLOOD PRESSURE: 85 MMHG | RESPIRATION RATE: 14 BRPM | SYSTOLIC BLOOD PRESSURE: 114 MMHG

## 2025-05-20 DIAGNOSIS — F10.929 ALCOHOL INTOXICATION (HCC): Primary | ICD-10-CM

## 2025-05-20 PROCEDURE — 99284 EMERGENCY DEPT VISIT MOD MDM: CPT | Performed by: EMERGENCY MEDICINE

## 2025-05-20 PROCEDURE — 99284 EMERGENCY DEPT VISIT MOD MDM: CPT

## 2025-05-20 PROCEDURE — 72125 CT NECK SPINE W/O DYE: CPT

## 2025-05-20 PROCEDURE — 70450 CT HEAD/BRAIN W/O DYE: CPT

## 2025-05-21 ENCOUNTER — APPOINTMENT (EMERGENCY)
Dept: RADIOLOGY | Facility: HOSPITAL | Age: 65
End: 2025-05-21
Payer: COMMERCIAL

## 2025-05-21 ENCOUNTER — PATIENT OUTREACH (OUTPATIENT)
Age: 65
End: 2025-05-21

## 2025-05-21 VITALS
DIASTOLIC BLOOD PRESSURE: 72 MMHG | OXYGEN SATURATION: 95 % | SYSTOLIC BLOOD PRESSURE: 158 MMHG | TEMPERATURE: 97 F | HEART RATE: 99 BPM | RESPIRATION RATE: 19 BRPM

## 2025-05-21 DIAGNOSIS — S80.211A ABRASION OF KNEE, BILATERAL: ICD-10-CM

## 2025-05-21 DIAGNOSIS — I48.91 ATRIAL FIBRILLATION WITH RAPID VENTRICULAR RESPONSE (HCC): Primary | ICD-10-CM

## 2025-05-21 DIAGNOSIS — S80.212A ABRASION OF KNEE, BILATERAL: ICD-10-CM

## 2025-05-21 LAB
ANION GAP SERPL CALCULATED.3IONS-SCNC: 16 MMOL/L (ref 4–13)
ATRIAL RATE: 441 BPM
BUN SERPL-MCNC: 17 MG/DL (ref 5–25)
CALCIUM SERPL-MCNC: 8 MG/DL (ref 8.4–10.2)
CHLORIDE SERPL-SCNC: 104 MMOL/L (ref 96–108)
CO2 SERPL-SCNC: 25 MMOL/L (ref 21–32)
CREAT SERPL-MCNC: 0.95 MG/DL (ref 0.6–1.3)
ERYTHROCYTE [DISTWIDTH] IN BLOOD BY AUTOMATED COUNT: 19.1 % (ref 11.6–15.1)
ETHANOL SERPL-MCNC: 254 MG/DL
GFR SERPL CREATININE-BSD FRML MDRD: 84 ML/MIN/1.73SQ M
GLUCOSE SERPL-MCNC: 128 MG/DL (ref 65–140)
HCT VFR BLD AUTO: 34 % (ref 36.5–49.3)
HGB BLD-MCNC: 10.9 G/DL (ref 12–17)
MCH RBC QN AUTO: 31.6 PG (ref 26.8–34.3)
MCHC RBC AUTO-ENTMCNC: 32.1 G/DL (ref 31.4–37.4)
MCV RBC AUTO: 99 FL (ref 82–98)
PLATELET # BLD AUTO: 187 THOUSANDS/UL (ref 149–390)
PMV BLD AUTO: 9.9 FL (ref 8.9–12.7)
POTASSIUM SERPL-SCNC: 4 MMOL/L (ref 3.5–5.3)
QRS AXIS: 122 DEGREES
QRSD INTERVAL: 88 MS
QT INTERVAL: 294 MS
QTC INTERVAL: 442 MS
RBC # BLD AUTO: 3.45 MILLION/UL (ref 3.88–5.62)
SODIUM SERPL-SCNC: 145 MMOL/L (ref 135–147)
T WAVE AXIS: -75 DEGREES
VENTRICULAR RATE: 136 BPM
WBC # BLD AUTO: 4.21 THOUSAND/UL (ref 4.31–10.16)

## 2025-05-21 PROCEDURE — 93005 ELECTROCARDIOGRAM TRACING: CPT

## 2025-05-21 PROCEDURE — 96366 THER/PROPH/DIAG IV INF ADDON: CPT

## 2025-05-21 PROCEDURE — 99285 EMERGENCY DEPT VISIT HI MDM: CPT | Performed by: EMERGENCY MEDICINE

## 2025-05-21 PROCEDURE — 96361 HYDRATE IV INFUSION ADD-ON: CPT

## 2025-05-21 PROCEDURE — 80048 BASIC METABOLIC PNL TOTAL CA: CPT | Performed by: EMERGENCY MEDICINE

## 2025-05-21 PROCEDURE — 99285 EMERGENCY DEPT VISIT HI MDM: CPT

## 2025-05-21 PROCEDURE — 85027 COMPLETE CBC AUTOMATED: CPT | Performed by: EMERGENCY MEDICINE

## 2025-05-21 PROCEDURE — 96375 TX/PRO/DX INJ NEW DRUG ADDON: CPT

## 2025-05-21 PROCEDURE — 93010 ELECTROCARDIOGRAM REPORT: CPT | Performed by: INTERNAL MEDICINE

## 2025-05-21 PROCEDURE — 96376 TX/PRO/DX INJ SAME DRUG ADON: CPT

## 2025-05-21 PROCEDURE — 96365 THER/PROPH/DIAG IV INF INIT: CPT

## 2025-05-21 PROCEDURE — 85007 BL SMEAR W/DIFF WBC COUNT: CPT | Performed by: EMERGENCY MEDICINE

## 2025-05-21 PROCEDURE — 73564 X-RAY EXAM KNEE 4 OR MORE: CPT

## 2025-05-21 PROCEDURE — 36415 COLL VENOUS BLD VENIPUNCTURE: CPT | Performed by: EMERGENCY MEDICINE

## 2025-05-21 PROCEDURE — 85060 BLOOD SMEAR INTERPRETATION: CPT | Performed by: PATHOLOGY

## 2025-05-21 PROCEDURE — 82077 ASSAY SPEC XCP UR&BREATH IA: CPT | Performed by: EMERGENCY MEDICINE

## 2025-05-21 RX ORDER — MIDAZOLAM HYDROCHLORIDE 2 MG/2ML
2 INJECTION, SOLUTION INTRAMUSCULAR; INTRAVENOUS ONCE
Status: COMPLETED | OUTPATIENT
Start: 2025-05-21 | End: 2025-05-21

## 2025-05-21 RX ORDER — GINSENG 100 MG
1 CAPSULE ORAL ONCE
Status: COMPLETED | OUTPATIENT
Start: 2025-05-21 | End: 2025-05-21

## 2025-05-21 RX ORDER — DILTIAZEM HYDROCHLORIDE 120 MG/1
CAPSULE, COATED, EXTENDED RELEASE ORAL
Status: DISCONTINUED
Start: 2025-05-21 | End: 2025-05-21 | Stop reason: HOSPADM

## 2025-05-21 RX ORDER — ACETAMINOPHEN 325 MG/1
975 TABLET ORAL ONCE
Status: COMPLETED | OUTPATIENT
Start: 2025-05-21 | End: 2025-05-21

## 2025-05-21 RX ORDER — METOPROLOL TARTRATE 1 MG/ML
5 INJECTION, SOLUTION INTRAVENOUS ONCE
Status: COMPLETED | OUTPATIENT
Start: 2025-05-21 | End: 2025-05-21

## 2025-05-21 RX ORDER — METOPROLOL SUCCINATE 50 MG/1
50 TABLET, EXTENDED RELEASE ORAL ONCE
Status: COMPLETED | OUTPATIENT
Start: 2025-05-21 | End: 2025-05-21

## 2025-05-21 RX ORDER — DILTIAZEM HYDROCHLORIDE 5 MG/ML
INJECTION INTRAVENOUS
Status: DISCONTINUED
Start: 2025-05-21 | End: 2025-05-21 | Stop reason: HOSPADM

## 2025-05-21 RX ORDER — DILTIAZEM HYDROCHLORIDE 5 MG/ML
15 INJECTION INTRAVENOUS ONCE
Status: COMPLETED | OUTPATIENT
Start: 2025-05-21 | End: 2025-05-21

## 2025-05-21 RX ORDER — DILTIAZEM HYDROCHLORIDE 120 MG/1
120 CAPSULE, COATED, EXTENDED RELEASE ORAL ONCE
Status: COMPLETED | OUTPATIENT
Start: 2025-05-21 | End: 2025-05-21

## 2025-05-21 RX ADMIN — BACITRACIN ZINC 1 SMALL APPLICATION: 500 OINTMENT TOPICAL at 02:40

## 2025-05-21 RX ADMIN — METOPROLOL SUCCINATE 50 MG: 50 TABLET, EXTENDED RELEASE ORAL at 05:09

## 2025-05-21 RX ADMIN — DILTIAZEM HYDROCHLORIDE 15 MG: 5 INJECTION, SOLUTION INTRAVENOUS at 03:09

## 2025-05-21 RX ADMIN — DILTIAZEM HYDROCHLORIDE 15 MG: 5 INJECTION, SOLUTION INTRAVENOUS at 02:17

## 2025-05-21 RX ADMIN — MIDAZOLAM 2 MG: 1 INJECTION INTRAMUSCULAR; INTRAVENOUS at 03:40

## 2025-05-21 RX ADMIN — METOPROLOL TARTRATE 5 MG: 5 INJECTION INTRAVENOUS at 03:40

## 2025-05-21 RX ADMIN — BACITRACIN ZINC 1 SMALL APPLICATION: 500 OINTMENT TOPICAL at 03:14

## 2025-05-21 RX ADMIN — DILTIAZEM HYDROCHLORIDE 5 MG/HR: 5 INJECTION INTRAVENOUS at 02:37

## 2025-05-21 RX ADMIN — ACETAMINOPHEN 975 MG: 325 TABLET, FILM COATED ORAL at 02:19

## 2025-05-21 RX ADMIN — DILTIAZEM HYDROCHLORIDE 15 MG/HR: 5 INJECTION INTRAVENOUS at 03:44

## 2025-05-21 RX ADMIN — SODIUM CHLORIDE 1000 ML: 0.9 INJECTION, SOLUTION INTRAVENOUS at 02:16

## 2025-05-21 RX ADMIN — DILTIAZEM HYDROCHLORIDE 120 MG: 120 CAPSULE, COATED, EXTENDED RELEASE ORAL at 05:09

## 2025-05-21 NOTE — H&P
H&P - Family Medicine   Name: Gregg Partida 65 y.o. male I MRN: [id number]  Unit/Bed#: ED 10 I Date of Admission: 5/21/2025   Date of Service: 5/21/2025 I Hospital Day: 0   { ?Quick Links I Problem List I PORCH I Billing Tip:70882}  Assessment & Plan  Alcohol withdrawal (HCC)      Pt was started on CIWA protocol. IV Fluids D5NS at 75ml/hr.     Plan  Continue home folate, thiamine  Fall precautions  Seizure precautions  Monitor Mental Status  Aspiration precautions  Atrial fibrillation with RVR ([facility])    ED course:   Continue home med Eliquis 5 mg BID, Metoprolol 150 mg Q12, Cardizem 120 mg Q12   Monitor on Tele  Type 2 diabetes mellitus with chronic kidney disease ([facility])    Lab Results   Component Value Date    HGBA1C 5.2 05/05/2025     Well controlled. Patient is on metformin 500 mg daily at home    Plan  Held home metformin  ISS  Accu checks ACHS  Carb Controlled Diet  Hypoglycemia protocol   Dyslipidemia  Chronic, Last lipid panel- Total cholesterol- 153, Triglycerides 101, HDL 46, LDL 87     Continue with home lipitor  Fall  H/o of falls, usually after drinking    Fall precautions  Ordered PT   Continue F/u PT outpatient  History of prostate cancer  Chronic stable, PMH of prostate CA (2020), s/p resection 2021.   Continue home med flomax 0.4 mg daily  CAD (coronary artery disease)  Patient has a h/o of CAD s/p CABG.     Continue home meds Aspirin 81 mg EC tablet daily. Ranolazine 500 mg Q12 and Eliquis 5 mg BID     Nicotine abuse  Chronic stable smoked 1.5 packs x day  Nicotine 21 mg/24 hr patch  Smoking cessation education  COPD (chronic obstructive pulmonary disease) ([facility])  Chronic stable, not in acute exacerbation  Continue home inhalers- albuterol and breo-ellipta  QT prolongation  POA EKG , has h/o of prolonged QTC (5/14/25 )   Avoid QTC prolonging meds  Continue to monitor      VTE Pharmacologic Prophylaxis:   {VTE Risk:49996}  Code Status: Prior ***  Discussion with family: {Family  Communication:81718}    Anticipated Length of Stay: Patient will be admitted on an observation basis with an anticipated length of stay of less than 2 midnights secondary to Alcohol Withdrawal.    History of Present Illness   Chief Complaint: Alcohol Withdrawal    Gregg Partida is a 65 y.o. male with a PMH of CAD, A-fib/Aflutter, DMT2, HTN, COPD, CKD,GERD, Alcohol Dependance, Hyponatremia, h/o of prostate cancer s/p resection, dyslipidemia, noncompliance  who presents with.... alcohol intoxication.     Review of Systems    Historical Information   Past Medical History:   Diagnosis Date    Acute on chronic kidney failure  (HCC)     Alcohol abuse     Alcohol withdrawal (HCC) 06/07/2019    Atrial fibrillation (HCC)     Cancer (HCC)     prostate ca,had radiation    Cardiac disease     stents,then triple bypass    COPD (chronic obstructive pulmonary disease) (HCC)     Coronary artery disease     ETOH abuse     Heart failure (HCC)     History of heart surgery     says [facility]    Hx of heart artery stent     2014    Hyperlipidemia     Hypertension     Hyponatremia 10/17/2019    Hypovolemic shock (HCC) 12/22/2019    Lumbar spondylitis (HCC) 10/13/2022    Metabolic acidosis, increased anion gap 04/21/2021    Nasal bone fracture 10/10/2022    Prostate CA (HCC)     S/P CABG x 3     2004    Sleep apnea      Past Surgical History:   Procedure Laterality Date    CARDIAC CATHETERIZATION      2 stents    CORONARY ARTERY BYPASS GRAFT      CORONARY ARTERY BYPASS GRAFT  2004    MS ARTHRD ANT INTERBODY MIN DSC CRV BELOW C2 N/A 12/16/2020    Procedure: Anterior cervical discectomy with fusion C4-C7; Posterior cervical decompression and fusion C2-T2;  Surgeon: David Rowell MD;  Location: [facility];  Service: Neurosurgery    TONSILLECTOMY       Social History     Tobacco Use    Smoking status: Every Day     Current packs/day: 1.50     Average packs/day: 1.5 packs/day for 40.0 years (60.0 ttl pk-yrs)     Types:  "Cigarettes    Smokeless tobacco: Never   Vaping Use    Vaping status: Never Used   Substance and Sexual Activity    Alcohol use: Yes     Alcohol/week: 4.0 standard drinks of alcohol     Types: 4 Standard drinks or equivalent per week     Comment: \"quart a day\"    Drug use: No    Sexual activity: Not Currently     E-Cigarette/Vaping    E-Cigarette Use Never User      E-Cigarette/Vaping Substances    Nicotine Yes     THC No     CBD No     Flavoring No     Other No     Unknown No      Family History   Problem Relation Age of Onset    Diabetes Mother     Uterine cancer Mother     COPD Father     Hypertension Father      Social History:  Marital Status: Single   Occupation: Unemployed  Patient Pre-hospital Living Situation: {Living Situation:26633}  Patient Pre-hospital Level of Mobility: {Mobility:45307}  Patient Pre-hospital Diet Restrictions: ***    Meds/Allergies   {Home Meds:69067}  Prior to Admission medications    Medication Sig Start Date End Date Taking? Authorizing Provider   albuterol (PROVENTIL HFA,VENTOLIN HFA) 90 mcg/act inhaler Inhale 2 puffs every 4 (four) hours as needed for wheezing    Historical Provider, MD   albuterol (PROVENTIL HFA,VENTOLIN HFA) 90 mcg/act inhaler Inhale 2 puffs every 6 (six) hours as needed for wheezing    Historical Provider, MD   albuterol (Ventolin HFA) 90 mcg/act inhaler Inhale 2 puffs every 4 (four) hours as needed for wheezing 4/16/25   Rich Finn MD   aluminum-magnesium hydroxide 200-200 MG/5ML suspension Take 5 mL by mouth every 6 (six) hours as needed for heartburn    Historical Provider, MD   aluminum-magnesium hydroxide-simethicone (MAALOX) 7359-6518-529 mg/30 mL suspension Take 30 mL by mouth every 4 (four) hours as needed for indigestion or heartburn 4/16/25   Rich Finn MD   apixaban (ELIQUIS) 5 mg Take 5 mg by mouth in the morning and 5 mg in the evening.    Historical Provider, MD   apixaban (Eliquis) 5 mg Take 5 mg by mouth 2 (two) times a day    " Historical Provider, MD   aspirin 81 mg chewable tablet Chew 81 mg in the morning.    Historical Provider, MD   aspirin 81 mg chewable tablet Chew 81 mg daily    Historical Provider, MD   atorvastatin (LIPITOR) 40 mg tablet Take 1 tablet (40 mg total) by mouth daily 4/16/25   Rich Finn MD   atorvastatin (LIPITOR) 40 mg tablet Take 40 mg by mouth daily    Historical Provider, MD   Blood Glucose Monitoring Suppl (ONE TOUCH ULTRA MINI) w/Device KIT Use as directed 5/16/19   Dinora Wick MD   Blood Pressure Monitoring (Adult Blood Pressure Cuff Lg) KIT Use in the morning 12/14/23   Elliott Zimmerman MD   diltiazem (CARDIZEM SR) 120 mg 12 hr capsule Take 120 mg by mouth 2 (two) times a day    Historical Provider, MD   diltiazem (CARDIZEM SR) 120 mg 12 hr capsule Take 120 mg by mouth 2 (two) times a day    Historical Provider, MD   ferrous sulfate 325 (65 Fe) mg tablet Take 325 mg by mouth daily with breakfast    Historical Provider, MD   ferrous sulfate 325 (65 Fe) mg tablet Take 325 mg by mouth daily with breakfast    Historical Provider, MD   fluticasone-vilanterol (Breo Ellipta) 200-25 mcg/actuation inhaler Inhale 1 puff daily Rinse mouth after use.    Historical Provider, MD   fluticasone-vilanterol 200-25 mcg/actuation inhaler Inhale 1 puff daily Rinse mouth after use.    Historical Provider, MD   folic acid (FOLVITE) 1 mg tablet Take by mouth daily    Historical Provider, MD   folic acid (FOLVITE) 1 mg tablet Take by mouth daily    Historical Provider, MD   gabapentin (NEURONTIN) 300 mg capsule Take 300 mg by mouth 3 (three) times a day    Historical Provider, MD   gabapentin (NEURONTIN) 300 mg capsule Take 300 mg by mouth 3 (three) times a day    Historical Provider, MD   lidocaine (LIDODERM) 5 % Apply 1 patch topically daily Remove & Discard patch within 12 hours or as directed by MD    Historical Provider, MD   magnesium Oxide (MAG-OX) 400 mg TABS Take 2 tablets (800 mg total) by mouth 2 (two)  times a day 4/16/25   Rich Finn MD   magnesium oxide-pyridoxine (BEELITH) 362-20 MG TABS Take 1 tablet by mouth daily    Historical Provider, MD   magnesium oxide-pyridoxine (BEELITH) 362-20 MG TABS Take 1 tablet by mouth daily    Historical Provider, MD   metFORMIN (GLUCOPHAGE) 500 mg tablet Take 500 mg by mouth daily with breakfast    Historical Provider, MD   metFORMIN (GLUCOPHAGE) 500 mg tablet Take 500 mg by mouth 2 (two) times a day with meals    Historical Provider, MD   metoprolol succinate (TOPROL-XL) 100 mg 24 hr tablet Take 150 mg by mouth every 12 (twelve) hours    Historical Provider, MD   metoprolol succinate (TOPROL-XL) 100 mg 24 hr tablet Take 1 tablet (100 mg total) by mouth 2 (two) times a day 5/9/25 7/8/25  Tiffany Carter DO   naltrexone (REVIA) 50 mg tablet Take 50 mg by mouth daily    Historical Provider, MD   naltrexone (REVIA) 50 mg tablet Take 1 tablet (50 mg total) by mouth daily 5/14/25 6/13/25  Nicky Messer DO   nicotine (NICODERM CQ) 21 mg/24 hr TD 24 hr patch Place 1 patch on the skin over 24 hours daily 4/16/25   Rich Finn MD   nicotine (NICODERM CQ) 21 mg/24 hr TD 24 hr patch Place 1 patch on the skin every 24 hours    Historical Provider, MD SCHAEFER DELICA LANCETS FINE MISC 3 (three) times a day Test 5/16/19   Historical Provider, MD   pantoprazole (PROTONIX) 40 mg tablet Take 40 mg by mouth daily    Historical Provider, MD   pantoprazole (PROTONIX) 40 mg tablet Take 40 mg by mouth daily    Historical Provider, MD   pyridoxine (B-6) 100 MG tablet Take 100 mg by mouth daily    Historical Provider, MD   pyridoxine (VITAMIN B6) 100 mg tablet Take 100 mg by mouth daily    Historical Provider, MD   ranolazine (RANEXA) 500 mg 12 hr tablet Take 500 mg by mouth 2 (two) times a day    Historical Provider, MD   ranolazine (RANEXA) 500 mg 12 hr tablet Take 500 mg by mouth 2 (two) times a day    Historical Provider, MD   saccharomyces boulardii (FLORASTOR) 250 mg capsule Take  250 mg by mouth 2 (two) times a day    Historical Provider, MD   saccharomyces boulardii (FLORASTOR) 250 mg capsule Take 250 mg by mouth 2 (two) times a day    Historical Provider, MD   senna-docusate sodium (SENOKOT-S) 8.6-50 mg per tablet Take 1 tablet by mouth daily    Historical Provider, MD   tamsulosin (FLOMAX) 0.4 mg Take 0.4 mg by mouth daily with dinner    Historical Provider, MD   tamsulosin (FLOMAX) 0.4 mg Take 0.4 mg by mouth daily with dinner    Historical Provider, MD   Thiamine HCl (vitamin B-1) 100 MG TABS Take 1 tablet (100 mg total) by mouth daily 4/16/25   Rich Finn MD   Thiamine Mononitrate (VITAMIN B1) 100 mg tablet Take 100 mg by mouth daily    Historical Provider, MD   vancomycin (VANCOCIN) 125 MG capsule Take 1 capsule (125 mg total) by mouth every 6 (six) hours for 13 days, THEN 1 capsule (125 mg total) every 12 (twelve) hours for 7 days, THEN 1 capsule (125 mg total) daily for 7 days, THEN 1 capsule (125 mg total) every other day for 22 days. 5/9/25 6/27/25  Tiffany Carter, DO     No Known Allergies    Objective :{?Quick Links I ICU Summary I Vitals I I/Os I LDAs I Mobility (PT/OT) I Code Status / ACP   ?Quick Links I Active Meds I Pain Meds I Antibiotics I Anticoagulants:89789}  Temp:  [97 °F (36.1 °C)-97.7 °F (36.5 °C)] 97 °F (36.1 °C)  HR:  [] 96  BP: (114-185)/(78-94) 144/78  Resp:  [14-22] 17  SpO2:  [93 %-96 %] 93 %  O2 Device: None (Room air)    Physical Exam ***    Lines/Drains:          {?Quick Links I Lab Review I Micro Results I Radiology I Cardiology:32842}  Lab Results: I have reviewed the following results:  Results from last 7 days   Lab Units 05/21/25  0159 05/14/25  0514   WBC Thousand/uL 4.21* 6.55   HEMOGLOBIN g/dL 10.9* 9.4*   HEMATOCRIT % 34.0* 30.9*   PLATELETS Thousands/uL 187 157   SEGS PCT %  --  60   LYMPHO PCT %  --  17   MONO PCT %  --  20*   EOS PCT %  --  1     Results from last 7 days   Lab Units 05/21/25  0159 05/14/25  0514   SODIUM mmol/L 145 135    POTASSIUM mmol/L 4.0 3.8   CHLORIDE mmol/L 104 99   CO2 mmol/L 25 25   BUN mg/dL 17 14   CREATININE mg/dL 0.95 0.85   ANION GAP mmol/L 16* 11   CALCIUM mg/dL 8.0* 7.0*   ALBUMIN g/dL  --  2.9*   TOTAL BILIRUBIN mg/dL  --  0.78   ALK PHOS U/L  --  106*   ALT U/L  --  27   AST U/L  --  39   GLUCOSE RANDOM mg/dL 128 123         Results from last 7 days   Lab Units 05/14/25  1135 05/14/25  0716   POC GLUCOSE mg/dl 120 124     Lab Results   Component Value Date    HGBA1C 5.2 05/05/2025    HGBA1C 5.4 02/15/2025    HGBA1C 5.2 11/14/2024         Administrative Statements       ** Please Note: This note has been constructed using a voice recognition system. **

## 2025-05-21 NOTE — ED PROVIDER NOTES
Final Diagnosis:  1. Atrial fibrillation with rapid ventricular response (HCC)    2. Abrasion of knee, bilateral        Chief Complaint   Patient presents with    Knee Injury     Patient here with abrasion    Rapid Heart Rate     Patient here with rapid heart rate and abrasions to knees and arms       HPI  Pt sign out pending clinical sobriety after a fall intoxicated.     Standing. Walking on his own. No slur. Demanding to leave.     D/c  Escorted  out in wheel chair where he wants to call cab  Sits on ground. Then crawls back in and checks in for knee abrasions. No fall. It's on the video. Abrasions from crawling.     When brought in tachycardic. Has known afib, doesn't take his meds, freq admit for afib rvr.   No chest pain  No sob  Compllaint is pain at knee abrasions.     EMS additionally reports:     - Previous charting underwent limited review with attention to last ED visits, labs, ekgs, and prior imaging.  Chart review reveals :     Admission on 05/12/2025, Discharged on 05/14/2025   Component Date Value Ref Range Status    WBC 05/12/2025 4.82  4.31 - 10.16 Thousand/uL Final    RBC 05/12/2025 3.26 (L)  3.88 - 5.62 Million/uL Final    Hemoglobin 05/12/2025 10.3 (L)  12.0 - 17.0 g/dL Final    Hematocrit 05/12/2025 31.8 (L)  36.5 - 49.3 % Final    MCV 05/12/2025 98  82 - 98 fL Final    MCH 05/12/2025 31.6  26.8 - 34.3 pg Final    MCHC 05/12/2025 32.4  31.4 - 37.4 g/dL Final    RDW 05/12/2025 20.2 (H)  11.6 - 15.1 % Final    MPV 05/12/2025 9.9  8.9 - 12.7 fL Final    Platelets 05/12/2025 207  149 - 390 Thousands/uL Final    nRBC 05/12/2025 0  /100 WBCs Final    Segmented % 05/12/2025 49  43 - 75 % Final    Immature Grans % 05/12/2025 1  0 - 2 % Final    Lymphocytes % 05/12/2025 30  14 - 44 % Final    Monocytes % 05/12/2025 16 (H)  4 - 12 % Final    Eosinophils Relative 05/12/2025 1  0 - 6 % Final    Basophils Relative 05/12/2025 3 (H)  0 - 1 % Final    Absolute Neutrophils 05/12/2025 2.43  1.85 - 7.62  "Thousands/µL Final    Absolute Immature Grans 05/12/2025 0.04  0.00 - 0.20 Thousand/uL Final    Absolute Lymphocytes 05/12/2025 1.43  0.60 - 4.47 Thousands/µL Final    Absolute Monocytes 05/12/2025 0.76  0.17 - 1.22 Thousand/µL Final    Eosinophils Absolute 05/12/2025 0.03  0.00 - 0.61 Thousand/µL Final    Basophils Absolute 05/12/2025 0.13 (H)  0.00 - 0.10 Thousands/µL Final    Sodium 05/12/2025 140  135 - 147 mmol/L Final    Potassium 05/12/2025 4.8  3.5 - 5.3 mmol/L Final    Moderately Hemolyzed:Results may be affected.    Chloride 05/12/2025 102  96 - 108 mmol/L Final    CO2 05/12/2025 24  21 - 32 mmol/L Final    ANION GAP 05/12/2025 14 (H)  4 - 13 mmol/L Final    BUN 05/12/2025 9  5 - 25 mg/dL Final    Creatinine 05/12/2025 0.80  0.60 - 1.30 mg/dL Final    Standardized to IDMS reference method    Glucose 05/12/2025 80  65 - 140 mg/dL Final    If the patient is fasting, the ADA then defines impaired fasting glucose as > 100 mg/dL and diabetes as > or equal to 123 mg/dL.    Calcium 05/12/2025 7.2 (L)  8.4 - 10.2 mg/dL Final    eGFR 05/12/2025 95  ml/min/1.73sq m Final    Ventricular Rate 05/12/2025 103  BPM Final    Atrial Rate 05/12/2025 267  BPM Final    QRSD Interval 05/12/2025 90  ms Final    QT Interval 05/12/2025 340  ms Final    QTC Interval 05/12/2025 445  ms Final    QRS [place] 05/12/2025 88  degrees Final    T Wave [place] 05/12/2025 260  degrees Final    hs TnI 0hr 05/12/2025 28  \"Refer to ACS Flowchart\"- see link ng/L Final    Comment:                                              Initial (time 0) result  If >=50 ng/L, Myocardial injury suggested ;  Type of myocardial injury and treatment strategy  to be determined.  If 5-49 ng/L, a delta result at 2 hours will be needed to further evaluate.  If <4 ng/L, and chest pain has been >3 hours since onset, patient may qualify for discharge based on the HEART score in the ED.  If <5 ng/L and <3hours since onset of chest pain, a delta result at 2 hours will be " "needed to further evaluate.    HS Troponin 99th Percentile URL of a Health Population=12 ng/L with a 95% Confidence Interval of 8-18 ng/L.    Second Troponin (time 2 hours)  If calculated delta >= 20 ng/L,  Myocardial injury suggested ; Type of myocardial injury and treatment strategy to be determined.  If 5-49 ng/L and the calculated delta is 5-19 ng/L, consult medical service for evaluation.  Continue evaluation for ischemia on ecg and other possible etiology and repeat hs troponin at 4 hours.  If delta is <5 ng/L at 2                            hours, consider discharge based on risk stratification via the HEART score (if in ED), or LAZARA risk score in IP/Observation.    HS Troponin 99th Percentile URL of a Health Population=12 ng/L with a 95% Confidence Interval of 8-18 ng/L.    hs TnI 2hr 05/13/2025 25  \"Refer to ACS Flowchart\"- see link ng/L Final    Comment:                                              Initial (time 0) result  If >=50 ng/L, Myocardial injury suggested ;  Type of myocardial injury and treatment strategy  to be determined.  If 5-49 ng/L, a delta result at 2 hours will be needed to further evaluate.  If <4 ng/L, and chest pain has been >3 hours since onset, patient may qualify for discharge based on the HEART score in the ED.  If <5 ng/L and <3hours since onset of chest pain, a delta result at 2 hours will be needed to further evaluate.    HS Troponin 99th Percentile URL of a Health Population=12 ng/L with a 95% Confidence Interval of 8-18 ng/L.    Second Troponin (time 2 hours)  If calculated delta >= 20 ng/L,  Myocardial injury suggested ; Type of myocardial injury and treatment strategy to be determined.  If 5-49 ng/L and the calculated delta is 5-19 ng/L, consult medical service for evaluation.  Continue evaluation for ischemia on ecg and other possible etiology and repeat hs troponin at 4 hours.  If delta is <5 ng/L at 2                            hours, consider discharge based on risk " stratification via the HEART score (if in ED), or LAZARA risk score in IP/Observation.    HS Troponin 99th Percentile URL of a Health Population=12 ng/L with a 95% Confidence Interval of 8-18 ng/L.    Delta 2hr hsTnI 05/13/2025 -3  <20 ng/L Final    Ventricular Rate 05/13/2025 102  BPM Final    Atrial Rate 05/13/2025 468  BPM Final    QRSD Interval 05/13/2025 90  ms Final    QT Interval 05/13/2025 356  ms Final    QTC Interval 05/13/2025 463  ms Final    QRS [place] 05/13/2025 103  degrees Final    T Wave [place] 05/13/2025 247  degrees Final    Ventricular Rate 05/13/2025 144  BPM Final    Atrial Rate 05/13/2025 141  BPM Final    QRSD Interval 05/13/2025 84  ms Final    QT Interval 05/13/2025 288  ms Final    QTC Interval 05/13/2025 445  ms Final    QRS [place] 05/13/2025 104  degrees Final    T Wave [place] 05/13/2025 257  degrees Final    Ethanol Lvl 05/13/2025 51 (H)  <10 mg/dL Final    WBC 05/13/2025 5.49  4.31 - 10.16 Thousand/uL Final    RBC 05/13/2025 3.27 (L)  3.88 - 5.62 Million/uL Final    Hemoglobin 05/13/2025 10.2 (L)  12.0 - 17.0 g/dL Final    Hematocrit 05/13/2025 32.3 (L)  36.5 - 49.3 % Final    MCV 05/13/2025 99 (H)  82 - 98 fL Final    MCH 05/13/2025 31.2  26.8 - 34.3 pg Final    MCHC 05/13/2025 31.6  31.4 - 37.4 g/dL Final    RDW 05/13/2025 19.9 (H)  11.6 - 15.1 % Final    MPV 05/13/2025 9.9  8.9 - 12.7 fL Final    Platelets 05/13/2025 200  149 - 390 Thousands/uL Final    nRBC 05/13/2025 0  /100 WBCs Final    Segmented % 05/13/2025 55  43 - 75 % Final    Immature Grans % 05/13/2025 1  0 - 2 % Final    Lymphocytes % 05/13/2025 20  14 - 44 % Final    Monocytes % 05/13/2025 20 (H)  4 - 12 % Final    Eosinophils Relative 05/13/2025 2  0 - 6 % Final    Basophils Relative 05/13/2025 2 (H)  0 - 1 % Final    Absolute Neutrophils 05/13/2025 3.07  1.85 - 7.62 Thousands/µL Final    Absolute Immature Grans 05/13/2025 0.04  0.00 - 0.20 Thousand/uL Final    Absolute Lymphocytes 05/13/2025 1.08  0.60 - 4.47 Thousands/µL Final     Absolute Monocytes 05/13/2025 1.10  0.17 - 1.22 Thousand/µL Final    Eosinophils Absolute 05/13/2025 0.08  0.00 - 0.61 Thousand/µL Final    Basophils Absolute 05/13/2025 0.12 (H)  0.00 - 0.10 Thousands/µL Final    Sodium 05/13/2025 140  135 - 147 mmol/L Final    Potassium 05/13/2025 3.5  3.5 - 5.3 mmol/L Final    Chloride 05/13/2025 103  96 - 108 mmol/L Final    CO2 05/13/2025 22  21 - 32 mmol/L Final    ANION GAP 05/13/2025 15 (H)  4 - 13 mmol/L Final    BUN 05/13/2025 8  5 - 25 mg/dL Final    Creatinine 05/13/2025 0.70  0.60 - 1.30 mg/dL Final    Standardized to IDMS reference method    Glucose 05/13/2025 78  65 - 140 mg/dL Final    If the patient is fasting, the ADA then defines impaired fasting glucose as > 100 mg/dL and diabetes as > or equal to 123 mg/dL.    Glucose, Fasting 05/13/2025 78  65 - 99 mg/dL Final    Calcium 05/13/2025 7.0 (L)  8.4 - 10.2 mg/dL Final    Corrected Calcium 05/13/2025 7.9 (L)  8.3 - 10.1 mg/dL Final    AST 05/13/2025 46 (H)  13 - 39 U/L Final    ALT 05/13/2025 31  7 - 52 U/L Final    Specimen collection should occur prior to Sulfasalazine administration due to the potential for falsely depressed results.     Alkaline Phosphatase 05/13/2025 118 (H)  34 - 104 U/L Final    Total Protein 05/13/2025 6.0 (L)  6.4 - 8.4 g/dL Final    Albumin 05/13/2025 2.9 (L)  3.5 - 5.0 g/dL Final    Total Bilirubin 05/13/2025 0.62  0.20 - 1.00 mg/dL Final    Use of this assay is not recommended for patients undergoing treatment with eltrombopag due to the potential for falsely elevated results.  N-acetyl-p-benzoquinone imine (metabolite of Acetaminophen) will generate erroneously low results in samples for patients that have taken an overdose of Acetaminophen.    eGFR 05/13/2025 100  ml/min/1.73sq m Final    Protime 05/13/2025 13.7  12.3 - 15.0 seconds Final    INR 05/13/2025 1.00  0.85 - 1.19 Final    POC Glucose 05/13/2025 85  65 - 140 mg/dl Final    POC Glucose 05/13/2025 121  65 - 140 mg/dl Final     POC Glucose 05/13/2025 113  65 - 140 mg/dl Final    POC Glucose 05/13/2025 139  65 - 140 mg/dl Final    POC Glucose 05/13/2025 139  65 - 140 mg/dl Final    Sodium 05/14/2025 135  135 - 147 mmol/L Final    Potassium 05/14/2025 3.8  3.5 - 5.3 mmol/L Final    Chloride 05/14/2025 99  96 - 108 mmol/L Final    CO2 05/14/2025 25  21 - 32 mmol/L Final    ANION GAP 05/14/2025 11  4 - 13 mmol/L Final    BUN 05/14/2025 14  5 - 25 mg/dL Final    Creatinine 05/14/2025 0.85  0.60 - 1.30 mg/dL Final    Standardized to IDMS reference method    Glucose 05/14/2025 123  65 - 140 mg/dL Final    If the patient is fasting, the ADA then defines impaired fasting glucose as > 100 mg/dL and diabetes as > or equal to 123 mg/dL.    Calcium 05/14/2025 7.0 (L)  8.4 - 10.2 mg/dL Final    Corrected Calcium 05/14/2025 7.9 (L)  8.3 - 10.1 mg/dL Final    AST 05/14/2025 39  13 - 39 U/L Final    ALT 05/14/2025 27  7 - 52 U/L Final    Specimen collection should occur prior to Sulfasalazine administration due to the potential for falsely depressed results.     Alkaline Phosphatase 05/14/2025 106 (H)  34 - 104 U/L Final    Total Protein 05/14/2025 5.7 (L)  6.4 - 8.4 g/dL Final    Albumin 05/14/2025 2.9 (L)  3.5 - 5.0 g/dL Final    Total Bilirubin 05/14/2025 0.78  0.20 - 1.00 mg/dL Final    Use of this assay is not recommended for patients undergoing treatment with eltrombopag due to the potential for falsely elevated results.  N-acetyl-p-benzoquinone imine (metabolite of Acetaminophen) will generate erroneously low results in samples for patients that have taken an overdose of Acetaminophen.    eGFR 05/14/2025 92  ml/min/1.73sq m Final    Magnesium 05/14/2025 1.5 (L)  1.9 - 2.7 mg/dL Final    WBC 05/14/2025 6.55  4.31 - 10.16 Thousand/uL Final    RBC 05/14/2025 2.99 (L)  3.88 - 5.62 Million/uL Final    Hemoglobin 05/14/2025 9.4 (L)  12.0 - 17.0 g/dL Final    Hematocrit 05/14/2025 30.9 (L)  36.5 - 49.3 % Final    MCV 05/14/2025 103 (H)  82 - 98 fL Final     MCH 05/14/2025 31.4  26.8 - 34.3 pg Final    MCHC 05/14/2025 30.4 (L)  31.4 - 37.4 g/dL Final    RDW 05/14/2025 19.7 (H)  11.6 - 15.1 % Final    MPV 05/14/2025 10.6  8.9 - 12.7 fL Final    Platelets 05/14/2025 157  149 - 390 Thousands/uL Final    nRBC 05/14/2025 0  /100 WBCs Final    Segmented % 05/14/2025 60  43 - 75 % Final    Immature Grans % 05/14/2025 1  0 - 2 % Final    Lymphocytes % 05/14/2025 17  14 - 44 % Final    Monocytes % 05/14/2025 20 (H)  4 - 12 % Final    Eosinophils Relative 05/14/2025 1  0 - 6 % Final    Basophils Relative 05/14/2025 1  0 - 1 % Final    Absolute Neutrophils 05/14/2025 3.94  1.85 - 7.62 Thousands/µL Final    Absolute Immature Grans 05/14/2025 0.03  0.00 - 0.20 Thousand/uL Final    Absolute Lymphocytes 05/14/2025 1.13  0.60 - 4.47 Thousands/µL Final    Absolute Monocytes 05/14/2025 1.31 (H)  0.17 - 1.22 Thousand/µL Final    Eosinophils Absolute 05/14/2025 0.06  0.00 - 0.61 Thousand/µL Final    Basophils Absolute 05/14/2025 0.08  0.00 - 0.10 Thousands/µL Final    Calcium, Ionized 05/14/2025 0.93 (L)  1.12 - 1.32 mmol/L Final    POC Glucose 05/14/2025 124  65 - 140 mg/dl Final    POC Glucose 05/14/2025 120  65 - 140 mg/dl Final       - No language barrier.   - History obtained from patient    - Discuss patient's care, with patient permission or by chart review, with      PMH:   has a past medical history of Acute on chronic kidney failure  (HCC), Alcohol abuse, Alcohol withdrawal (HCC) (06/07/2019), Atrial fibrillation (HCC), Cancer (HCC), Cardiac disease, COPD (chronic obstructive pulmonary disease) (HCC), Coronary artery disease, ETOH abuse, Heart failure (HCC), History of heart surgery, heart artery stent, Hyperlipidemia, Hypertension, Hyponatremia (10/17/2019), Hypovolemic shock (HCC) (12/22/2019), Lumbar spondylitis (HCC) (10/13/2022), Metabolic acidosis, increased anion gap (04/21/2021), Nasal bone fracture (10/10/2022), Prostate CA ([facility]), S/P CABG x 3, and Sleep apnea.    PSH:    has a past surgical history that includes Coronary artery bypass graft; Tonsillectomy; Coronary artery bypass graft (2004); pr arthrd ant interbody min dsc crv below c2 (N/A, 12/16/2020); and Cardiac catheterization.     Social History:  Tobacco Use: High Risk (5/21/2025)    Patient History     Smoking Tobacco Use: Every Day     Smokeless Tobacco Use: Never     Passive Exposure: Not on file     Alcohol Use: Alcohol Misuse (10/17/2022)    AUDIT-C     Frequency of Alcohol Consumption: 4 or more times a week     Average Number of Drinks: 10 or more     Frequency of Binge Drinking: Daily or almost daily     No illicit use       ROS:  Pertinent positives/negatives: .     Some ROS may be present in the HPI and would take precedent over these standard questions asked below.   Review of Systems   Musculoskeletal:  Positive for arthralgias.   Skin:  Positive for wound.        CONSTITUTIONAL:  No lethargy. No unexpected weight loss. No change in behavior.  EYES:  No pain, redness, or discharge. No loss of vision. No orbital trauma or pain.   ENT:  No tinnitus or decreased hearing. No epistaxis/purulent rhinorrhea. No voice change, airway closing, trismus.   CARDIOVASCULAR:  No chest pain. No skin mottling or pallor. No change in exertional capacity  RESPIRATORY:  No hemoptysis. No paroxysmal nocturnal dyspnea. No stridor. No apnea or bluing.   GASTROINTESTINAL:  No vomiting, diarrhea. No distension. No melena. No hematochezia.   GENITOURINARY:  No nocturia. No hematuria or foul smelling or cloudy urine. No discharge. No sores/adenopathy.   MUSCULOSKELETAL:  No contracture.  No new deformity.   INTEGUMENTARY:  No swelling. No unexpected contusions. No abrasions. No lymphangitis.  NEUROLOGIC:  No meningismus. No new numbness of the extremities. No new focal weakness. No postural instability  PSYCHIATRIC:  No SI HI AVH  HEMATOLOGICAL:  No bleeding. No petechiae. No bruising.  ALLERGIES:  No urticaria. No sudden abd cramping. No  stridor.    PE:     Physical exam highlights:   Physical Exam       Vitals:    05/21/25 0445 05/21/25 0515 05/21/25 0530 05/21/25 0545   BP: 156/89 147/92 137/69 137/68   BP Location: Right arm Right arm Right arm Right arm   Pulse: 92 98 93 93   Resp: 20 21 20 18   Temp:       TempSrc: Oral      SpO2: 95% 91% 92% 93%     Vitals reviewed by me.   Nursing note reviewed  Chaperone present for all sensitive exam.  Const: No acute distress. Alert. Nontoxic. Not diaphoretic.    HEENT: External ears normal. No protrusion drainage swelling. Nose normal. No drainage/traumatic deformity. MM. Mouth with baseline/symmetric movement. No trismus.   Eyes: No squinting. No icterus. No tearing/swelling/drainage. Tracks through the room with normal EOM.   Neck: ROM normal. No rigidity. No meningismus.  Cards: Rate as per vitals tachy and irregular. Compared to monitor sinus unless documented.   Pulm: Effort and excursion normal. No distress. No audible wheezing/no stridor. Normal resp rate without retraction or change in work of breathing.  Abd: No distension beyond baseline. No fluctuant wave. Patient without peritoneal pain with shifting/bumping the bed.   MSK: ROM normal baseline. No deformity. No contractures from baseline.   Skin: No new rashes visible. Well perfused. Bilateral knee abrasions.   Neuro: Nonfocal. Baseline. CN grossly intact. Moving all four with coordination.   Psych: Normal behavior and affect.        A:  - Nursing note reviewed.    Ddx and MDM  Considered diagnoses    Afib rvr  Medication noncompliance    Fluid resusc  Dilt gtt while giving PO dilt and PO lopressor he's due    Check lytes  Check for anemia    Xr knees for acute fracture dislocation  None appreciated    Bacitracin on knee abrasions  Nothing to suture    Monitored for 6 hours with 2 hours in controlled rate afib.       Dispo decision       My conversation with consultant reveals:        Decision rules:                    ED Course as of 05/21/25  0607   Wed May 21, 2025   0551 Procedure Note: EKG  Date/Time: 05/21/25 5:51 AM   Interpreted by: Fito Liang  Indications / Diagnosis: fast  ECG reviewed by me, the ED Provider: yes   The EKG demonstrates:  Rhythm: afib rvr  Intervals: normal intervals  Axis: right axis  QRS/Blocks: normal QRS  ST Changes: No acute ST Changes, no STD/KAREN.  T wave changes: nonspec       0554 Clears at 730         My read of the XR/CT scan reveals:     XR knee 4+ vw left injury   ED Interpretation   No fracture      XR knee 4+ vw right injury   ED Interpretation   No frac          Orders Placed This Encounter   Procedures    XR knee 4+ vw left injury    XR knee 4+ vw right injury    CBC and differential    Basic metabolic panel    Ethanol    ECG 12 lead     Labs Reviewed   CBC AND DIFFERENTIAL - Abnormal       Result Value Ref Range Status    WBC 4.21 (*) 4.31 - 10.16 Thousand/uL Final    RBC 3.45 (*) 3.88 - 5.62 Million/uL Final    Hemoglobin 10.9 (*) 12.0 - 17.0 g/dL Final    Hematocrit 34.0 (*) 36.5 - 49.3 % Final    MCV 99 (*) 82 - 98 fL Final    MCH 31.6  26.8 - 34.3 pg Final    MCHC 32.1  31.4 - 37.4 g/dL Final    RDW 19.1 (*) 11.6 - 15.1 % Final    MPV 9.9  8.9 - 12.7 fL Final    Platelets 187  149 - 390 Thousands/uL Final   BASIC METABOLIC PANEL - Abnormal    Sodium 145  135 - 147 mmol/L Final    Potassium 4.0  3.5 - 5.3 mmol/L Final    Comment: Slightly Hemolyzed:Results may be affected.    Chloride 104  96 - 108 mmol/L Final    CO2 25  21 - 32 mmol/L Final    ANION GAP 16 (*) 4 - 13 mmol/L Final    BUN 17  5 - 25 mg/dL Final    Creatinine 0.95  0.60 - 1.30 mg/dL Final    Comment: Standardized to IDMS reference method    Glucose 128  65 - 140 mg/dL Final    Comment: If the patient is fasting, the ADA then defines impaired fasting glucose as > 100 mg/dL and diabetes as > or equal to 123 mg/dL.    Calcium 8.0 (*) 8.4 - 10.2 mg/dL Final    eGFR 84  ml/min/1.73sq m Final    Narrative:     [employer]  guidelines for Chronic Kidney Disease (CKD):     Stage 1 with normal or high GFR (GFR > 90 mL/min/1.73 square meters)    Stage 2 Mild CKD (GFR = 60-89 mL/min/1.73 square meters)    Stage 3A Moderate CKD (GFR = 45-59 mL/min/1.73 square meters)    Stage 3B Moderate CKD (GFR = 30-44 mL/min/1.73 square meters)    Stage 4 Severe CKD (GFR = 15-29 mL/min/1.73 square meters)    Stage 5 End Stage CKD (GFR <15 mL/min/1.73 square meters)  Note: GFR calculation is accurate only with a steady state creatinine   MEDICAL ALCOHOL - Abnormal    Ethanol Lvl 254 (*) <10 mg/dL Final   PATH SLIDE REVIEW   MANUAL DIFFERENTIAL(PHLEBS DO NOT ORDER)       *Each of these labs was reviewed. Particular standout labs will be noted in the ED Course above     Final Diagnosis:  1. Atrial fibrillation with rapid ventricular response (HCC)    2. Abrasion of knee, bilateral          P:  - hospital tx includes   Medications   diltiazem (CARDIZEM) 25 mg/5 mL injection **ADS Override Pull** (has no administration in time range)   acetaminophen (TYLENOL) tablet 975 mg (975 mg Oral Given 5/21/25 0219)   bacitracin topical ointment 1 small application (1 small application Topical Given 5/21/25 0240)   sodium chloride 0.9 % bolus 1,000 mL (1,000 mL Intravenous New Bag 5/21/25 0216)   diltiazem (CARDIZEM) injection 15 mg (15 mg Intravenous Given 5/21/25 0217)   diltiazem (CARDIZEM) injection 15 mg (15 mg Intravenous Given 5/21/25 0309)   bacitracin topical ointment 1 small application (1 small application Topical Given 5/21/25 0314)   metoprolol (LOPRESSOR) injection 5 mg (5 mg Intravenous Given 5/21/25 0340)   midazolam (VERSED) injection 2 mg (2 mg Intravenous Given 5/21/25 0340)   diltiazem (CARDIZEM CD) 24 hr capsule 120 mg (120 mg Oral Given 5/21/25 0509)   metoprolol succinate (TOPROL-XL) 24 hr tablet 50 mg (50 mg Oral Given 5/21/25 0509)         - disposition  Time reflects when diagnosis was documented in both MDM as applicable and the Disposition  within this note       Time User Action Codes Description Comment    5/21/2025  5:29 AM Fito Liang [I48.91] Atrial fibrillation with rapid ventricular response (HCC)     5/21/2025  5:29 AM Fito Liang Add [S80.211A,  S80.212A] Abrasion of knee, bilateral           ED Disposition       ED Disposition   Discharge    Condition   Stable    Date/Time   Wed May 21, 2025  5:29 AM    Comment   Gregg Partida discharge to home/self care.                   Follow-up Information    None         - patient will call their PCP to let them know they were in the emergency department. We discuss return precautions and patient is agreeable with plan and aformentioned disposition.       - additional treatment intended, if consistent with primary provider:  - patient to follow with :      Patient's Medications   Discharge Prescriptions    No medications on file     No discharge procedures on file.  Prior to Admission Medications   Prescriptions Last Dose Informant Patient Reported? Taking?   Blood Glucose Monitoring Suppl (ONE TOUCH ULTRA MINI) w/Device KIT  Self Yes No   Sig: Use as directed   Blood Pressure Monitoring (Adult Blood Pressure Cuff Lg) KIT   No No   Sig: Use in the morning   ONETOUCH DELICA LANCETS FINE MISC  Self Yes No   Sig: 3 (three) times a day Test   Thiamine HCl (vitamin B-1) 100 MG TABS   No No   Sig: Take 1 tablet (100 mg total) by mouth daily   Thiamine Mononitrate (VITAMIN B1) 100 mg tablet   Yes No   Sig: Take 100 mg by mouth daily   albuterol (PROVENTIL HFA,VENTOLIN HFA) 90 mcg/act inhaler   Yes No   Sig: Inhale 2 puffs every 4 (four) hours as needed for wheezing   albuterol (PROVENTIL HFA,VENTOLIN HFA) 90 mcg/act inhaler   Yes No   Sig: Inhale 2 puffs every 6 (six) hours as needed for wheezing   albuterol (Ventolin HFA) 90 mcg/act inhaler   No No   Sig: Inhale 2 puffs every 4 (four) hours as needed for wheezing   aluminum-magnesium hydroxide 200-200 MG/5ML suspension   Yes No   Sig: Take 5  mL by mouth every 6 (six) hours as needed for heartburn   aluminum-magnesium hydroxide-simethicone (MAALOX) 2698-3889-542 mg/30 mL suspension   No No   Sig: Take 30 mL by mouth every 4 (four) hours as needed for indigestion or heartburn   apixaban (ELIQUIS) 5 mg   Yes No   Sig: Take 5 mg by mouth in the morning and 5 mg in the evening.   apixaban (Eliquis) 5 mg   Yes No   Sig: Take 5 mg by mouth 2 (two) times a day   aspirin 81 mg chewable tablet   Yes No   Sig: Chew 81 mg in the morning.   aspirin 81 mg chewable tablet   Yes No   Sig: Chew 81 mg daily   atorvastatin (LIPITOR) 40 mg tablet   No No   Sig: Take 1 tablet (40 mg total) by mouth daily   atorvastatin (LIPITOR) 40 mg tablet   Yes No   Sig: Take 40 mg by mouth daily   diltiazem (CARDIZEM SR) 120 mg 12 hr capsule   Yes No   Sig: Take 120 mg by mouth 2 (two) times a day   diltiazem (CARDIZEM SR) 120 mg 12 hr capsule   Yes No   Sig: Take 120 mg by mouth 2 (two) times a day   ferrous sulfate 325 (65 Fe) mg tablet   Yes No   Sig: Take 325 mg by mouth daily with breakfast   ferrous sulfate 325 (65 Fe) mg tablet   Yes No   Sig: Take 325 mg by mouth daily with breakfast   fluticasone-vilanterol (Breo Ellipta) 200-25 mcg/actuation inhaler   Yes No   Sig: Inhale 1 puff daily Rinse mouth after use.   fluticasone-vilanterol 200-25 mcg/actuation inhaler   Yes No   Sig: Inhale 1 puff daily Rinse mouth after use.   folic acid (FOLVITE) 1 mg tablet   Yes No   Sig: Take by mouth daily   folic acid (FOLVITE) 1 mg tablet   Yes No   Sig: Take by mouth daily   gabapentin (NEURONTIN) 300 mg capsule   Yes No   Sig: Take 300 mg by mouth 3 (three) times a day   gabapentin (NEURONTIN) 300 mg capsule   Yes No   Sig: Take 300 mg by mouth 3 (three) times a day   lidocaine (LIDODERM) 5 %   Yes No   Sig: Apply 1 patch topically daily Remove & Discard patch within 12 hours or as directed by MD   magnesium Oxide (MAG-OX) 400 mg TABS   No No   Sig: Take 2 tablets (800 mg total) by mouth 2  (two) times a day   magnesium oxide-pyridoxine (BEELITH) 362-20 MG TABS   Yes No   Sig: Take 1 tablet by mouth daily   magnesium oxide-pyridoxine (BEELITH) 362-20 MG TABS   Yes No   Sig: Take 1 tablet by mouth daily   metFORMIN (GLUCOPHAGE) 500 mg tablet   Yes No   Sig: Take 500 mg by mouth daily with breakfast   metFORMIN (GLUCOPHAGE) 500 mg tablet   Yes No   Sig: Take 500 mg by mouth 2 (two) times a day with meals   metoprolol succinate (TOPROL-XL) 100 mg 24 hr tablet   Yes No   Sig: Take 150 mg by mouth every 12 (twelve) hours   metoprolol succinate (TOPROL-XL) 100 mg 24 hr tablet   No No   Sig: Take 1 tablet (100 mg total) by mouth 2 (two) times a day   naltrexone (REVIA) 50 mg tablet   Yes No   Sig: Take 50 mg by mouth daily   naltrexone (REVIA) 50 mg tablet   No No   Sig: Take 1 tablet (50 mg total) by mouth daily   nicotine (NICODERM CQ) 21 mg/24 hr TD 24 hr patch   No No   Sig: Place 1 patch on the skin over 24 hours daily   nicotine (NICODERM CQ) 21 mg/24 hr TD 24 hr patch   Yes No   Sig: Place 1 patch on the skin every 24 hours   pantoprazole (PROTONIX) 40 mg tablet   Yes No   Sig: Take 40 mg by mouth daily   pantoprazole (PROTONIX) 40 mg tablet   Yes No   Sig: Take 40 mg by mouth daily   pyridoxine (B-6) 100 MG tablet   Yes No   Sig: Take 100 mg by mouth daily   pyridoxine (VITAMIN B6) 100 mg tablet   Yes No   Sig: Take 100 mg by mouth daily   ranolazine (RANEXA) 500 mg 12 hr tablet   Yes No   Sig: Take 500 mg by mouth 2 (two) times a day   ranolazine (RANEXA) 500 mg 12 hr tablet   Yes No   Sig: Take 500 mg by mouth 2 (two) times a day   saccharomyces boulardii (FLORASTOR) 250 mg capsule   Yes No   Sig: Take 250 mg by mouth 2 (two) times a day   saccharomyces boulardii (FLORASTOR) 250 mg capsule   Yes No   Sig: Take 250 mg by mouth 2 (two) times a day   senna-docusate sodium (SENOKOT-S) 8.6-50 mg per tablet   Yes No   Sig: Take 1 tablet by mouth daily   tamsulosin (FLOMAX) 0.4 mg   Yes No   Sig: Take 0.4  "mg by mouth daily with dinner   tamsulosin (FLOMAX) 0.4 mg   Yes No   Sig: Take 0.4 mg by mouth daily with dinner   vancomycin (VANCOCIN) 125 MG capsule   No No   Sig: Take 1 capsule (125 mg total) by mouth every 6 (six) hours for 13 days, THEN 1 capsule (125 mg total) every 12 (twelve) hours for 7 days, THEN 1 capsule (125 mg total) daily for 7 days, THEN 1 capsule (125 mg total) every other day for 22 days.      Facility-Administered Medications: None       Portions of the record may have been created with voice recognition software. Occasional wrong word or \"sound a like\" substitutions may have occurred due to the inherent limitations of voice recognition software. Read the chart carefully and recognize, using context, where substitutions have occurred.    Electronically signed by:  MD Fito Franco MD  05/21/25 0745    "

## 2025-05-21 NOTE — PROGRESS NOTES
Chart notes reviewed. Pt discharged from ED today for ETOH intoxication and fall.     Second outreach attempted. LVM requesting return call.   Unable to reach letter sent. Episode closed. Will reopen if new referral is received.

## 2025-05-21 NOTE — ASSESSMENT & PLAN NOTE
Pt was started on CIWA protocol. IV Fluids D5NS at 75ml/hr.     Plan  Continue home folate, thiamine  Fall precautions  Seizure precautions  Monitor Mental Status  Aspiration precautions

## 2025-05-21 NOTE — ASSESSMENT & PLAN NOTE
ED course:   Continue home med Eliquis 5 mg BID, Metoprolol 150 mg Q12, Cardizem 120 mg Q12   Monitor on Tele

## 2025-05-21 NOTE — ASSESSMENT & PLAN NOTE
Lab Results   Component Value Date    HGBA1C 5.2 05/05/2025     Well controlled. Patient is on metformin 500 mg daily at home    Plan  Held home metformin  ISS  Accu checks ACHS  Carb Controlled Diet  Hypoglycemia protocol

## 2025-05-21 NOTE — ASSESSMENT & PLAN NOTE
Chronic, Last lipid panel- Total cholesterol- 153, Triglycerides 101, HDL 46, LDL 87     Continue with home lipitor   no

## 2025-05-21 NOTE — ED PROVIDER NOTES
"Time reflects when diagnosis was documented in both MDM as applicable and the Disposition within this note       Time User Action Codes Description Comment    5/20/2025 11:47 PM Fito Liang Add [F10.929] Alcohol intoxication (HCC)           ED Disposition       ED Disposition   Discharge    Condition   Stable    Date/Time   Tue May 20, 2025 11:47 PM    Comment   Gregg Partida discharge to home/self care.                   Assessment & Plan       Medical Decision Making  Pulse ox 95% on room air indicating adequate oxygenation.        CT scans were negative for acute traumatic injury.  Patient signed out to next provider Dr. Liang pending sobriety.    Amount and/or Complexity of Data Reviewed  Radiology: ordered.             Medications - No data to display    ED Risk Strat Scores                    No data recorded        SBIRT 20yo+      Flowsheet Row Most Recent Value   Initial Alcohol Screen: US AUDIT-C     1. How often do you have a drink containing alcohol? 6 Filed at: 05/20/2025 1952   2. How many drinks containing alcohol do you have on a typical day you are drinking?  5 Filed at: 05/20/2025 1952   3a. Male UNDER 65: How often do you have five or more drinks on one occasion? 6 Filed at: 05/20/2025 1952   3b. FEMALE Any Age, or MALE 65+: How often do you have 4 or more drinks on one occassion? 6 Filed at: 05/20/2025 1952   Audit-C Score 23 Filed at: 05/20/2025 1952   BRIGIDA: How many times in the past year have you...    Used an illegal drug or used a prescription medication for non-medical reasons? Never Filed at: 05/20/2025 1952                            History of Present Illness       Chief Complaint   Patient presents with    Fall     Pt admits to consuming large amountof alcohol, fell and hit head on carpeted ground. Abrasion noted to forehead. States may have back of head as well. Pt c/o \"starving more than you could ever imagine\" during time of triage. Otherwise semi cooperative.        Past " Medical History:   Diagnosis Date    Acute on chronic kidney failure  (HCC)     Alcohol abuse     Alcohol withdrawal (HCC) 06/07/2019    Atrial fibrillation (HCC)     Cancer (HCC)     prostate ca,had radiation    Cardiac disease     stents,then triple bypass    COPD (chronic obstructive pulmonary disease) (HCC)     Coronary artery disease     ETOH abuse     Heart failure (HCC)     History of heart surgery     says triple bypass [facility]    Hx of heart artery stent     2014    Hyperlipidemia     Hypertension     Hyponatremia 10/17/2019    Hypovolemic shock (HCC) 12/22/2019    Lumbar spondylitis (HCC) 10/13/2022    Metabolic acidosis, increased anion gap 04/21/2021    Nasal bone fracture 10/10/2022    Prostate CA (HCC)     S/P CABG x 3     2004    Sleep apnea       Past Surgical History:   Procedure Laterality Date    CARDIAC CATHETERIZATION      2 stents    CORONARY ARTERY BYPASS GRAFT      CORONARY ARTERY BYPASS GRAFT  2004    TX ARTHRD ANT INTERBODY MIN DSC CRV BELOW C2 N/A 12/16/2020    Procedure: Anterior cervical discectomy with fusion C4-C7; Posterior cervical decompression and fusion C2-T2;  Surgeon: David Rowell MD;  Location: [facility];  Service: Neurosurgery    TONSILLECTOMY        Family History   Problem Relation Age of Onset    Diabetes Mother     Uterine cancer Mother     COPD Father     Hypertension Father       Social History[1]   E-Cigarette/Vaping    E-Cigarette Use Never User       E-Cigarette/Vaping Substances    Nicotine Yes     THC No     CBD No     Flavoring No     Other No     Unknown No       I have reviewed and agree with the history as documented.     Patient brought in by EMS from home for evaluation of alcohol intoxication and a fall.  Patient fell suffering an abrasion to his forehead.  Patient arrives demanding a sandwich immediately because he is starving.      History provided by:  EMS personnel      Review of Systems   All other systems reviewed and are  negative.          Objective       ED Triage Vitals [05/20/25 1953]   Temperature Pulse Blood Pressure Respirations SpO2 Patient Position - Orthostatic VS   97.7 °F (36.5 °C) 89 114/85 14 95 % Lying      Temp Source Heart Rate Source BP Location FiO2 (%) Pain Score    Oral -- Left arm -- --      Vitals      Date and Time Temp Pulse SpO2 Resp BP Pain Score FACES Pain Rating User   05/20/25 1953 97.7 °F (36.5 °C) 89 95 % 14 114/85 -- -- ELY            Physical Exam  Vitals and nursing note reviewed.   HENT:      Head:       Cardiovascular:      Rate and Rhythm: Normal rate and regular rhythm.   Pulmonary:      Effort: Pulmonary effort is normal. No respiratory distress.      Breath sounds: Normal breath sounds.     Neurological:      General: No focal deficit present.      Mental Status: He is alert.         Results Reviewed       None            CT head without contrast   Final Interpretation by Halima Obrien MD (05/20 2043)      No acute intracranial abnormality.  Stable chronic microangiopathic changes within the brain.   Right parietal scalp contusion has improved.               Workstation performed: [id number]         CT cervical spine without contrast   Final Interpretation by Halima Obrien MD (05/20 2054)         1. No cervical spine fracture or traumatic malalignment.   2. Stable findings status post posterior and anterior cervical fusion as described.   3. Left pleural effusion and patchy opacities in the left upper lobe.                     Workstation performed: [id number]             Procedures    ED Medication and Procedure Management   Prior to Admission Medications   Prescriptions Last Dose Informant Patient Reported? Taking?   Blood Glucose Monitoring Suppl (ONE TOUCH ULTRA MINI) w/Device KIT  Self Yes No   Sig: Use as directed   Blood Pressure Monitoring (Adult Blood Pressure Cuff Lg) KIT   No No   Sig: Use in the morning   ONETOUCH DELICA LANCETS FINE MISC  Self Yes No   Sig: 3  (three) times a day Test   Thiamine HCl (vitamin B-1) 100 MG TABS   No No   Sig: Take 1 tablet (100 mg total) by mouth daily   Thiamine Mononitrate (VITAMIN B1) 100 mg tablet   Yes No   Sig: Take 100 mg by mouth daily   albuterol (PROVENTIL HFA,VENTOLIN HFA) 90 mcg/act inhaler   Yes No   Sig: Inhale 2 puffs every 4 (four) hours as needed for wheezing   albuterol (PROVENTIL HFA,VENTOLIN HFA) 90 mcg/act inhaler   Yes No   Sig: Inhale 2 puffs every 6 (six) hours as needed for wheezing   albuterol (Ventolin HFA) 90 mcg/act inhaler   No No   Sig: Inhale 2 puffs every 4 (four) hours as needed for wheezing   aluminum-magnesium hydroxide 200-200 MG/5ML suspension   Yes No   Sig: Take 5 mL by mouth every 6 (six) hours as needed for heartburn   aluminum-magnesium hydroxide-simethicone (MAALOX) 8647-8167-572 mg/30 mL suspension   No No   Sig: Take 30 mL by mouth every 4 (four) hours as needed for indigestion or heartburn   apixaban (ELIQUIS) 5 mg Past Week  Yes Yes   Sig: Take 5 mg by mouth in the morning and 5 mg in the evening.   apixaban (Eliquis) 5 mg   Yes No   Sig: Take 5 mg by mouth 2 (two) times a day   aspirin 81 mg chewable tablet Past Week  Yes Yes   Sig: Chew 81 mg in the morning.   aspirin 81 mg chewable tablet   Yes No   Sig: Chew 81 mg daily   atorvastatin (LIPITOR) 40 mg tablet   No No   Sig: Take 1 tablet (40 mg total) by mouth daily   atorvastatin (LIPITOR) 40 mg tablet   Yes No   Sig: Take 40 mg by mouth daily   diltiazem (CARDIZEM SR) 120 mg 12 hr capsule   Yes No   Sig: Take 120 mg by mouth 2 (two) times a day   diltiazem (CARDIZEM SR) 120 mg 12 hr capsule   Yes No   Sig: Take 120 mg by mouth 2 (two) times a day   ferrous sulfate 325 (65 Fe) mg tablet   Yes No   Sig: Take 325 mg by mouth daily with breakfast   ferrous sulfate 325 (65 Fe) mg tablet   Yes No   Sig: Take 325 mg by mouth daily with breakfast   fluticasone-vilanterol (Breo Ellipta) 200-25 mcg/actuation inhaler   Yes No   Sig: Inhale 1 puff  daily Rinse mouth after use.   fluticasone-vilanterol 200-25 mcg/actuation inhaler   Yes No   Sig: Inhale 1 puff daily Rinse mouth after use.   folic acid (FOLVITE) 1 mg tablet   Yes No   Sig: Take by mouth daily   folic acid (FOLVITE) 1 mg tablet   Yes No   Sig: Take by mouth daily   gabapentin (NEURONTIN) 300 mg capsule   Yes No   Sig: Take 300 mg by mouth 3 (three) times a day   gabapentin (NEURONTIN) 300 mg capsule   Yes No   Sig: Take 300 mg by mouth 3 (three) times a day   lidocaine (LIDODERM) 5 %   Yes No   Sig: Apply 1 patch topically daily Remove & Discard patch within 12 hours or as directed by MD   magnesium Oxide (MAG-OX) 400 mg TABS   No No   Sig: Take 2 tablets (800 mg total) by mouth 2 (two) times a day   magnesium oxide-pyridoxine (BEELITH) 362-20 MG TABS   Yes No   Sig: Take 1 tablet by mouth daily   magnesium oxide-pyridoxine (BEELITH) 362-20 MG TABS   Yes No   Sig: Take 1 tablet by mouth daily   metFORMIN (GLUCOPHAGE) 500 mg tablet   Yes No   Sig: Take 500 mg by mouth daily with breakfast   metFORMIN (GLUCOPHAGE) 500 mg tablet   Yes No   Sig: Take 500 mg by mouth 2 (two) times a day with meals   metoprolol succinate (TOPROL-XL) 100 mg 24 hr tablet   Yes No   Sig: Take 150 mg by mouth every 12 (twelve) hours   metoprolol succinate (TOPROL-XL) 100 mg 24 hr tablet   No No   Sig: Take 1 tablet (100 mg total) by mouth 2 (two) times a day   naltrexone (REVIA) 50 mg tablet   Yes No   Sig: Take 50 mg by mouth daily   naltrexone (REVIA) 50 mg tablet   No No   Sig: Take 1 tablet (50 mg total) by mouth daily   nicotine (NICODERM CQ) 21 mg/24 hr TD 24 hr patch   No No   Sig: Place 1 patch on the skin over 24 hours daily   nicotine (NICODERM CQ) 21 mg/24 hr TD 24 hr patch   Yes No   Sig: Place 1 patch on the skin every 24 hours   pantoprazole (PROTONIX) 40 mg tablet   Yes No   Sig: Take 40 mg by mouth daily   pantoprazole (PROTONIX) 40 mg tablet   Yes No   Sig: Take 40 mg by mouth daily   pyridoxine (B-6) 100  MG tablet   Yes No   Sig: Take 100 mg by mouth daily   pyridoxine (VITAMIN B6) 100 mg tablet   Yes No   Sig: Take 100 mg by mouth daily   ranolazine (RANEXA) 500 mg 12 hr tablet   Yes No   Sig: Take 500 mg by mouth 2 (two) times a day   ranolazine (RANEXA) 500 mg 12 hr tablet   Yes No   Sig: Take 500 mg by mouth 2 (two) times a day   saccharomyces boulardii (FLORASTOR) 250 mg capsule   Yes No   Sig: Take 250 mg by mouth 2 (two) times a day   saccharomyces boulardii (FLORASTOR) 250 mg capsule   Yes No   Sig: Take 250 mg by mouth 2 (two) times a day   senna-docusate sodium (SENOKOT-S) 8.6-50 mg per tablet   Yes No   Sig: Take 1 tablet by mouth daily   tamsulosin (FLOMAX) 0.4 mg   Yes No   Sig: Take 0.4 mg by mouth daily with dinner   tamsulosin (FLOMAX) 0.4 mg   Yes No   Sig: Take 0.4 mg by mouth daily with dinner   vancomycin (VANCOCIN) 125 MG capsule   No No   Sig: Take 1 capsule (125 mg total) by mouth every 6 (six) hours for 13 days, THEN 1 capsule (125 mg total) every 12 (twelve) hours for 7 days, THEN 1 capsule (125 mg total) daily for 7 days, THEN 1 capsule (125 mg total) every other day for 22 days.      Facility-Administered Medications: None     Discharge Medication List as of 5/21/2025  1:34 AM        CONTINUE these medications which have NOT CHANGED    Details   !! apixaban (ELIQUIS) 5 mg Take 5 mg by mouth in the morning and 5 mg in the evening., Historical Med      !! aspirin 81 mg chewable tablet Chew 81 mg in the morning., Historical Med      !! albuterol (PROVENTIL HFA,VENTOLIN HFA) 90 mcg/act inhaler Inhale 2 puffs every 4 (four) hours as needed for wheezing, Historical Med      !! albuterol (PROVENTIL HFA,VENTOLIN HFA) 90 mcg/act inhaler Inhale 2 puffs every 6 (six) hours as needed for wheezing, Historical Med      !! albuterol (Ventolin HFA) 90 mcg/act inhaler Inhale 2 puffs every 4 (four) hours as needed for wheezing, Starting Wed 4/16/2025, Normal      aluminum-magnesium hydroxide 200-200 MG/5ML  suspension Take 5 mL by mouth every 6 (six) hours as needed for heartburn, Historical Med      aluminum-magnesium hydroxide-simethicone (MAALOX) 2486-4474-378 mg/30 mL suspension Take 30 mL by mouth every 4 (four) hours as needed for indigestion or heartburn, Starting Wed 4/16/2025, Normal      !! apixaban (Eliquis) 5 mg Take 5 mg by mouth 2 (two) times a day, Historical Med      !! aspirin 81 mg chewable tablet Chew 81 mg daily, Historical Med      !! atorvastatin (LIPITOR) 40 mg tablet Take 1 tablet (40 mg total) by mouth daily, Starting Wed 4/16/2025, Normal      !! atorvastatin (LIPITOR) 40 mg tablet Take 40 mg by mouth daily, Historical Med      Blood Glucose Monitoring Suppl (ONE TOUCH ULTRA MINI) w/Device KIT Use as directed, Starting Thu 5/16/2019, Historical Med      Blood Pressure Monitoring (Adult Blood Pressure Cuff Lg) KIT Use in the morning, Starting Thu 12/14/2023, Normal      !! diltiazem (CARDIZEM SR) 120 mg 12 hr capsule Take 120 mg by mouth 2 (two) times a day, Historical Med      !! diltiazem (CARDIZEM SR) 120 mg 12 hr capsule Take 120 mg by mouth 2 (two) times a day, Historical Med      !! ferrous sulfate 325 (65 Fe) mg tablet Take 325 mg by mouth daily with breakfast, Historical Med      !! ferrous sulfate 325 (65 Fe) mg tablet Take 325 mg by mouth daily with breakfast, Historical Med      !! fluticasone-vilanterol (Breo Ellipta) 200-25 mcg/actuation inhaler Inhale 1 puff daily Rinse mouth after use., Historical Med      !! fluticasone-vilanterol 200-25 mcg/actuation inhaler Inhale 1 puff daily Rinse mouth after use., Historical Med      !! folic acid (FOLVITE) 1 mg tablet Take by mouth daily, Historical Med      !! folic acid (FOLVITE) 1 mg tablet Take by mouth daily, Historical Med      !! gabapentin (NEURONTIN) 300 mg capsule Take 300 mg by mouth 3 (three) times a day, Historical Med      !! gabapentin (NEURONTIN) 300 mg capsule Take 300 mg by mouth 3 (three) times a day, Historical Med       lidocaine (LIDODERM) 5 % Apply 1 patch topically daily Remove & Discard patch within 12 hours or as directed by MD, Historical Med      magnesium Oxide (MAG-OX) 400 mg TABS Take 2 tablets (800 mg total) by mouth 2 (two) times a day, Starting Wed 4/16/2025, Normal      !! magnesium oxide-pyridoxine (BEELITH) 362-20 MG TABS Take 1 tablet by mouth daily, Historical Med      !! magnesium oxide-pyridoxine (BEELITH) 362-20 MG TABS Take 1 tablet by mouth daily, Historical Med      !! metFORMIN (GLUCOPHAGE) 500 mg tablet Take 500 mg by mouth daily with breakfast, Historical Med      !! metFORMIN (GLUCOPHAGE) 500 mg tablet Take 500 mg by mouth 2 (two) times a day with meals, Historical Med      !! metoprolol succinate (TOPROL-XL) 100 mg 24 hr tablet Take 150 mg by mouth every 12 (twelve) hours, Historical Med      !! metoprolol succinate (TOPROL-XL) 100 mg 24 hr tablet Take 1 tablet (100 mg total) by mouth 2 (two) times a day, Starting Fri 5/9/2025, Until Tue 7/8/2025, Normal      !! naltrexone (REVIA) 50 mg tablet Take 50 mg by mouth daily, Historical Med      !! naltrexone (REVIA) 50 mg tablet Take 1 tablet (50 mg total) by mouth daily, Starting Wed 5/14/2025, Until Fri 6/13/2025, Normal      !! nicotine (NICODERM CQ) 21 mg/24 hr TD 24 hr patch Place 1 patch on the skin over 24 hours daily, Starting Wed 4/16/2025, Normal      !! nicotine (NICODERM CQ) 21 mg/24 hr TD 24 hr patch Place 1 patch on the skin every 24 hours, Historical Med      ONETOUCH DELICA LANCETS FINE MISC 3 (three) times a day Test, Starting Thu 5/16/2019, Historical Med      !! pantoprazole (PROTONIX) 40 mg tablet Take 40 mg by mouth daily, Historical Med      !! pantoprazole (PROTONIX) 40 mg tablet Take 40 mg by mouth daily, Historical Med      !! pyridoxine (B-6) 100 MG tablet Take 100 mg by mouth daily, Historical Med      !! pyridoxine (VITAMIN B6) 100 mg tablet Take 100 mg by mouth daily, Historical Med      !! ranolazine (RANEXA) 500 mg 12 hr  tablet Take 500 mg by mouth 2 (two) times a day, Historical Med      !! ranolazine (RANEXA) 500 mg 12 hr tablet Take 500 mg by mouth 2 (two) times a day, Historical Med      !! saccharomyces boulardii (FLORASTOR) 250 mg capsule Take 250 mg by mouth 2 (two) times a day, Historical Med      !! saccharomyces boulardii (FLORASTOR) 250 mg capsule Take 250 mg by mouth 2 (two) times a day, Historical Med      senna-docusate sodium (SENOKOT-S) 8.6-50 mg per tablet Take 1 tablet by mouth daily, Historical Med      !! tamsulosin (FLOMAX) 0.4 mg Take 0.4 mg by mouth daily with dinner, Historical Med      !! tamsulosin (FLOMAX) 0.4 mg Take 0.4 mg by mouth daily with dinner, Historical Med      Thiamine HCl (vitamin B-1) 100 MG TABS Take 1 tablet (100 mg total) by mouth daily, Starting Wed 4/16/2025, Normal      Thiamine Mononitrate (VITAMIN B1) 100 mg tablet Take 100 mg by mouth daily, Historical Med      vancomycin (VANCOCIN) 125 MG capsule Multiple Dosages:Starting Fri 5/9/2025, Until Wed 5/21/2025 at 2359, THEN Starting Thu 5/22/2025, Until Wed 5/28/2025 at 2359, THEN Starting Thu 5/29/2025, Until Wed 6/4/2025 at 2359, THEN Starting Thu 6/5/2025, Until Thu 6/26/2025 at 2359Take 1 cap allison (125 mg total) by mouth every 6 (six) hours for 13 days, THEN 1 capsule (125 mg total) every 12 (twelve) hours for 7 days, THEN 1 capsule (125 mg total) daily for 7 days, THEN 1 capsule (125 mg total) every other day for 22 days., Normal       !! - Potential duplicate medications found. Please discuss with provider.        No discharge procedures on file.  ED SEPSIS DOCUMENTATION   Time reflects when diagnosis was documented in both MDM as applicable and the Disposition within this note       Time User Action Codes Description Comment    5/20/2025 11:47 PM Fito Liang Add [F10.929] Alcohol intoxication (HCC)                      [1]   Social History  Tobacco Use    Smoking status: Every Day     Current packs/day: 1.50     Average  "packs/day: 1.5 packs/day for 40.0 years (60.0 ttl pk-yrs)     Types: Cigarettes    Smokeless tobacco: Never   Vaping Use    Vaping status: Never Used   Substance Use Topics    Alcohol use: Yes     Alcohol/week: 4.0 standard drinks of alcohol     Types: 4 Standard drinks or equivalent per week     Comment: \"quart a day\"    Drug use: No        Quinn Krishna,   05/21/25 [id number]    "

## 2025-05-21 NOTE — LETTER
Date: 05/21/25    Dear Gregg Partida,   My name is Grant; I am a registered nurse care manager working with [address] [address] [phone].     I have been trying to reach you to discuss and assist you with your healthcare needs. If you would like assistance, please call me at [phone].     Sincerely,  Grant Whittaker RN   Outpatient Care Manager

## 2025-05-21 NOTE — ASSESSMENT & PLAN NOTE
Chronic stable, PMH of prostate CA (2020), s/p resection 2021.   Continue home med flomax 0.4 mg daily

## 2025-05-21 NOTE — ASSESSMENT & PLAN NOTE
Patient has a h/o of CAD s/p CABG.     Continue home meds Aspirin 81 mg EC tablet daily. Ranolazine 500 mg Q12 and Eliquis 5 mg BID

## 2025-05-22 ENCOUNTER — TELEPHONE (OUTPATIENT)
Age: 65
End: 2025-05-22

## 2025-05-22 ENCOUNTER — RESULTS FOLLOW-UP (OUTPATIENT)
Dept: LABOR AND DELIVERY | Facility: HOSPITAL | Age: 65
End: 2025-05-22

## 2025-05-22 LAB
ANISOCYTOSIS BLD QL SMEAR: PRESENT
BASOPHILS # BLD MANUAL: 0.38 THOUSAND/UL (ref 0–0.1)
BASOPHILS NFR MAR MANUAL: 9 % (ref 0–1)
EOSINOPHIL # BLD MANUAL: 0.13 THOUSAND/UL (ref 0–0.4)
EOSINOPHIL NFR BLD MANUAL: 3 % (ref 0–6)
HYPERCHROMIA BLD QL SMEAR: PRESENT
LYMPHOCYTES # BLD AUTO: 1.73 THOUSAND/UL (ref 0.6–4.47)
LYMPHOCYTES # BLD AUTO: 41 % (ref 14–44)
MACROCYTES BLD QL AUTO: PRESENT
MICROCYTES BLD QL AUTO: PRESENT
MONOCYTES # BLD AUTO: 0.34 THOUSAND/UL (ref 0–1.22)
MONOCYTES NFR BLD: 8 % (ref 4–12)
NEUTROPHILS # BLD MANUAL: 1.64 THOUSAND/UL (ref 1.85–7.62)
NEUTS BAND NFR BLD MANUAL: 1 % (ref 0–8)
NEUTS SEG NFR BLD AUTO: 38 % (ref 43–75)
OVALOCYTES BLD QL SMEAR: PRESENT
PATHOLOGY REVIEW: YES
PLATELET BLD QL SMEAR: ADEQUATE
RBC MORPH BLD: PRESENT
TARGETS BLD QL SMEAR: PRESENT
TOTAL CELLS COUNTED SPEC: 100

## 2025-05-22 NOTE — TELEPHONE ENCOUNTER
Contacted Patient regarding recent ED Visit dated 5/21/25     Advised patient to contact the office with new or worsen symptoms as we have On Call Provider 24 hrs or return to ED if needed. Provided office hours and phone. No further action needed at this time.        ED:[facility]  CC: Knee Injury: Rapid Hear Rate  DX:Atrial fibrillation with rapid ventricular response; Abrasion of knee bilateral.   Time: 1:35am   Last OV:

## 2025-05-23 ENCOUNTER — HOSPITAL ENCOUNTER (EMERGENCY)
Facility: HOSPITAL | Age: 65
Discharge: HOME | DRG: 896 | End: 2025-05-24
Attending: EMERGENCY MEDICINE | Admitting: EMERGENCY MEDICINE
Payer: COMMERCIAL

## 2025-05-23 ENCOUNTER — APPOINTMENT (EMERGENCY)
Dept: RADIOLOGY | Facility: HOSPITAL | Age: 65
DRG: 896 | End: 2025-05-23
Payer: COMMERCIAL
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
## 2025-05-23 DIAGNOSIS — S00.81XA ABRASION OF FOREHEAD, INITIAL ENCOUNTER: ICD-10-CM

## 2025-05-23 PROCEDURE — 72125 CT NECK SPINE W/O DYE: CPT

## 2025-05-23 PROCEDURE — 99284 EMERGENCY DEPT VISIT MOD MDM: CPT | Performed by: EMERGENCY MEDICINE

## 2025-05-23 PROCEDURE — 70450 CT HEAD/BRAIN W/O DYE: CPT

## 2025-05-23 PROCEDURE — 99284 EMERGENCY DEPT VISIT MOD MDM: CPT

## 2025-05-23 RX ORDER — METOPROLOL SUCCINATE 50 MG/1
100 TABLET, EXTENDED RELEASE ORAL DAILY
Status: DISCONTINUED | OUTPATIENT
Start: 2025-05-24 | End: 2025-05-23

## 2025-05-23 RX ORDER — FOLIC ACID 1 MG/1
1 TABLET ORAL ONCE
Status: COMPLETED | OUTPATIENT
Start: 2025-05-23 | End: 2025-05-23

## 2025-05-23 RX ORDER — METOPROLOL SUCCINATE 50 MG/1
100 TABLET, EXTENDED RELEASE ORAL ONCE
Status: COMPLETED | OUTPATIENT
Start: 2025-05-23 | End: 2025-05-24

## 2025-05-23 RX ORDER — DILTIAZEM HYDROCHLORIDE 60 MG/1
120 CAPSULE, EXTENDED RELEASE ORAL EVERY 12 HOURS SCHEDULED
Status: DISCONTINUED | OUTPATIENT
Start: 2025-05-23 | End: 2025-05-24 | Stop reason: HOSPADM

## 2025-05-23 RX ORDER — ACETAMINOPHEN 325 MG/1
975 TABLET ORAL ONCE AS NEEDED
Status: COMPLETED | OUTPATIENT
Start: 2025-05-23 | End: 2025-05-24

## 2025-05-23 RX ORDER — LANOLIN ALCOHOL/MO/W.PET/CERES
100 CREAM (GRAM) TOPICAL ONCE
Status: COMPLETED | OUTPATIENT
Start: 2025-05-23 | End: 2025-05-23

## 2025-05-23 RX ADMIN — THIAMINE HCL TAB 100 MG 100 MG: 100 TAB at 22:58

## 2025-05-23 RX ADMIN — FOLIC ACID 1 MG: 1 TABLET ORAL at 22:58

## 2025-05-23 RX ADMIN — Medication 1 TABLET: at 22:58

## 2025-05-23 RX ADMIN — Medication 3 MG: at 22:58

## 2025-05-23 RX ADMIN — DILTIAZEM HYDROCHLORIDE 120 MG: 60 CAPSULE, EXTENDED RELEASE ORAL at 22:58

## 2025-05-24 ENCOUNTER — HOSPITAL ENCOUNTER (INPATIENT)
Facility: HOSPITAL | Age: 65
LOS: 3 days | Discharge: HOME | DRG: 896 | End: 2025-05-28
Admitting: INTERNAL MEDICINE
Payer: COMMERCIAL

## 2025-05-24 ENCOUNTER — APPOINTMENT (EMERGENCY)
Dept: RADIOLOGY | Facility: HOSPITAL | Age: 65
DRG: 896 | End: 2025-05-24
Payer: COMMERCIAL

## 2025-05-24 VITALS
DIASTOLIC BLOOD PRESSURE: 77 MMHG | RESPIRATION RATE: 17 BRPM | TEMPERATURE: 96.4 F | SYSTOLIC BLOOD PRESSURE: 113 MMHG | HEART RATE: 83 BPM | OXYGEN SATURATION: 100 %

## 2025-05-24 DIAGNOSIS — N40.0 BENIGN PROSTATIC HYPERPLASIA, UNSPECIFIED WHETHER LOWER URINARY TRACT SYMPTOMS PRESENT: ICD-10-CM

## 2025-05-24 DIAGNOSIS — Z72.0 TOBACCO ABUSE: ICD-10-CM

## 2025-05-24 DIAGNOSIS — Z72.0 NICOTINE ABUSE: Chronic | ICD-10-CM

## 2025-05-24 DIAGNOSIS — E11.22 TYPE 2 DIABETES MELLITUS WITH CHRONIC KIDNEY DISEASE, WITHOUT LONG-TERM CURRENT USE OF INSULIN, UNSPECIFIED CKD STAGE (HCC): ICD-10-CM

## 2025-05-24 DIAGNOSIS — I13.0 HYPERTENSIVE HEART AND CHRONIC KIDNEY DISEASE WITH HEART FAILURE AND STAGE 1 THROUGH STAGE 4 CHRONIC KIDNEY DISEASE, OR CHRONIC KIDNEY DISEASE (HCC): ICD-10-CM

## 2025-05-24 DIAGNOSIS — F33.9 DEPRESSION, RECURRENT (HCC): ICD-10-CM

## 2025-05-24 DIAGNOSIS — N18.31 STAGE 3A CHRONIC KIDNEY DISEASE (HCC): ICD-10-CM

## 2025-05-24 DIAGNOSIS — R65.10 SIRS (SYSTEMIC INFLAMMATORY RESPONSE SYNDROME) (HCC): ICD-10-CM

## 2025-05-24 DIAGNOSIS — A04.71 RECURRENT CLOSTRIDIOIDES DIFFICILE DIARRHEA: ICD-10-CM

## 2025-05-24 DIAGNOSIS — N18.2 CKD (CHRONIC KIDNEY DISEASE) STAGE 2, GFR 60-89 ML/MIN: ICD-10-CM

## 2025-05-24 DIAGNOSIS — F10.10 ALCOHOL ABUSE: ICD-10-CM

## 2025-05-24 DIAGNOSIS — E11.9 TYPE 2 DIABETES MELLITUS, WITHOUT LONG-TERM CURRENT USE OF INSULIN (HCC): ICD-10-CM

## 2025-05-24 DIAGNOSIS — I48.91 A-FIB (HCC): ICD-10-CM

## 2025-05-24 DIAGNOSIS — F10.939 ALCOHOL WITHDRAWAL SYNDROME WITH COMPLICATION (HCC): Primary | ICD-10-CM

## 2025-05-24 DIAGNOSIS — I25.10 CORONARY ARTERY DISEASE, UNSPECIFIED VESSEL OR LESION TYPE, UNSPECIFIED WHETHER ANGINA PRESENT, UNSPECIFIED WHETHER NATIVE OR TRANSPLANTED HEART: Chronic | ICD-10-CM

## 2025-05-24 DIAGNOSIS — D61.818 PANCYTOPENIA (HCC): ICD-10-CM

## 2025-05-24 DIAGNOSIS — I5A NONISCHEMIC NONTRAUMATIC MYOCARDIAL INJURY: ICD-10-CM

## 2025-05-24 DIAGNOSIS — Z91.199 NONCOMPLIANCE: ICD-10-CM

## 2025-05-24 DIAGNOSIS — M48.02 CERVICAL SPINAL STENOSIS: Chronic | ICD-10-CM

## 2025-05-24 DIAGNOSIS — I20.89 STABLE ANGINA (HCC): ICD-10-CM

## 2025-05-24 DIAGNOSIS — N17.9 ACUTE KIDNEY INJURY SUPERIMPOSED ON CHRONIC KIDNEY DISEASE  (HCC): ICD-10-CM

## 2025-05-24 DIAGNOSIS — S31.819A: ICD-10-CM

## 2025-05-24 DIAGNOSIS — S31.819A OPEN WOUND OF RIGHT BUTTOCK, INITIAL ENCOUNTER: ICD-10-CM

## 2025-05-24 DIAGNOSIS — K92.1 GASTROINTESTINAL HEMORRHAGE WITH MELENA: ICD-10-CM

## 2025-05-24 DIAGNOSIS — E87.8 ELECTROLYTE ABNORMALITY: ICD-10-CM

## 2025-05-24 DIAGNOSIS — F10.939 ALCOHOL WITHDRAWAL (HCC): ICD-10-CM

## 2025-05-24 DIAGNOSIS — E44.1 MILD PROTEIN-CALORIE MALNUTRITION (HCC): ICD-10-CM

## 2025-05-24 DIAGNOSIS — F10.90 CHRONIC ALCOHOL USE: ICD-10-CM

## 2025-05-24 DIAGNOSIS — R94.31 QT PROLONGATION: ICD-10-CM

## 2025-05-24 DIAGNOSIS — J44.9 CHRONIC OBSTRUCTIVE PULMONARY DISEASE, UNSPECIFIED COPD TYPE (HCC): ICD-10-CM

## 2025-05-24 DIAGNOSIS — R94.31 ABNORMAL EKG: ICD-10-CM

## 2025-05-24 DIAGNOSIS — R26.2 AMBULATORY DYSFUNCTION: ICD-10-CM

## 2025-05-24 DIAGNOSIS — F10.929 ALCOHOL INTOXICATION (HCC): ICD-10-CM

## 2025-05-24 DIAGNOSIS — J44.9 COPD, SEVERITY TO BE DETERMINED (HCC): ICD-10-CM

## 2025-05-24 DIAGNOSIS — N18.9 ACUTE KIDNEY INJURY SUPERIMPOSED ON CHRONIC KIDNEY DISEASE  (HCC): ICD-10-CM

## 2025-05-24 DIAGNOSIS — E83.42 HYPOMAGNESEMIA: ICD-10-CM

## 2025-05-24 DIAGNOSIS — R06.02 SOB (SHORTNESS OF BREATH): ICD-10-CM

## 2025-05-24 DIAGNOSIS — F10.10 ETOH ABUSE: ICD-10-CM

## 2025-05-24 DIAGNOSIS — T17.500A MUCUS PLUGGING OF BRONCHI: ICD-10-CM

## 2025-05-24 DIAGNOSIS — R93.89 ABNORMAL CXR: ICD-10-CM

## 2025-05-24 DIAGNOSIS — D64.9 ANEMIA: ICD-10-CM

## 2025-05-24 DIAGNOSIS — J96.01 ACUTE RESPIRATORY FAILURE WITH HYPOXIA (HCC): ICD-10-CM

## 2025-05-24 DIAGNOSIS — E78.5 DYSLIPIDEMIA: ICD-10-CM

## 2025-05-24 DIAGNOSIS — R09.02 HYPOXIA: ICD-10-CM

## 2025-05-24 DIAGNOSIS — C61 PROSTATE CANCER (HCC): ICD-10-CM

## 2025-05-24 DIAGNOSIS — N30.91 HEMORRHAGIC CYSTITIS: ICD-10-CM

## 2025-05-24 DIAGNOSIS — I48.91 ATRIAL FIBRILLATION WITH RVR (HCC): ICD-10-CM

## 2025-05-24 DIAGNOSIS — I10 HYPERTENSION: ICD-10-CM

## 2025-05-24 DIAGNOSIS — K70.0 FATTY LIVER, ALCOHOLIC: ICD-10-CM

## 2025-05-24 DIAGNOSIS — R53.1 GENERALIZED WEAKNESS: ICD-10-CM

## 2025-05-24 DIAGNOSIS — I50.32 CHRONIC HEART FAILURE WITH PRESERVED EJECTION FRACTION (HCC): ICD-10-CM

## 2025-05-24 DIAGNOSIS — E87.1 HYPONATREMIA: ICD-10-CM

## 2025-05-24 DIAGNOSIS — Z85.46 HISTORY OF PROSTATE CANCER: Chronic | ICD-10-CM

## 2025-05-24 DIAGNOSIS — I48.11 LONGSTANDING PERSISTENT ATRIAL FIBRILLATION (HCC): ICD-10-CM

## 2025-05-24 DIAGNOSIS — Z59.86 FINANCIAL INSECURITY: ICD-10-CM

## 2025-05-24 DIAGNOSIS — E87.29 METABOLIC ACIDOSIS, INCREASED ANION GAP: ICD-10-CM

## 2025-05-24 DIAGNOSIS — Z95.1 HX OF CABG: Chronic | ICD-10-CM

## 2025-05-24 DIAGNOSIS — K21.9 GASTROESOPHAGEAL REFLUX DISEASE, UNSPECIFIED WHETHER ESOPHAGITIS PRESENT: ICD-10-CM

## 2025-05-24 DIAGNOSIS — R79.89 ABNORMAL LFTS: ICD-10-CM

## 2025-05-24 DIAGNOSIS — F10.90 ALCOHOL USE DISORDER: ICD-10-CM

## 2025-05-24 DIAGNOSIS — R06.09 DYSPNEA ON EXERTION: ICD-10-CM

## 2025-05-24 DIAGNOSIS — R93.7 ABNORMAL CT SCAN, CERVICAL SPINE: ICD-10-CM

## 2025-05-24 PROBLEM — J96.90 RESPIRATORY FAILURE (HCC): Status: ACTIVE | Noted: 2024-12-07

## 2025-05-24 LAB
ALBUMIN SERPL BCG-MCNC: 3.2 G/DL (ref 3.5–5)
ALP SERPL-CCNC: 155 U/L (ref 34–104)
ALT SERPL W P-5'-P-CCNC: 47 U/L (ref 7–52)
AMMONIA PLAS-SCNC: 33 UMOL/L (ref 18–72)
ANION GAP SERPL CALCULATED.3IONS-SCNC: 18 MMOL/L (ref 4–13)
AST SERPL W P-5'-P-CCNC: 99 U/L (ref 13–39)
ATRIAL RATE: 94 BPM
BACTERIA UR QL AUTO: ABNORMAL /HPF
BASOPHILS # BLD AUTO: 0.1 THOUSANDS/ÂΜL (ref 0–0.1)
BASOPHILS NFR BLD AUTO: 2 % (ref 0–1)
BILIRUB SERPL-MCNC: 0.74 MG/DL (ref 0.2–1)
BILIRUB UR QL STRIP: ABNORMAL
BUN SERPL-MCNC: 19 MG/DL (ref 5–25)
CALCIUM ALBUM COR SERPL-MCNC: 7.9 MG/DL (ref 8.3–10.1)
CALCIUM SERPL-MCNC: 7.3 MG/DL (ref 8.4–10.2)
CHLORIDE SERPL-SCNC: 100 MMOL/L (ref 96–108)
CLARITY UR: CLEAR
CO2 SERPL-SCNC: 24 MMOL/L (ref 21–32)
COLOR UR: ABNORMAL
CREAT SERPL-MCNC: 1.39 MG/DL (ref 0.6–1.3)
EOSINOPHIL # BLD AUTO: 0.08 THOUSAND/ÂΜL (ref 0–0.61)
EOSINOPHIL NFR BLD AUTO: 1 % (ref 0–6)
ERYTHROCYTE [DISTWIDTH] IN BLOOD BY AUTOMATED COUNT: 18.6 % (ref 11.6–15.1)
GFR SERPL CREATININE-BSD FRML MDRD: 53 ML/MIN/1.73SQ M
GLUCOSE SERPL-MCNC: 112 MG/DL (ref 65–140)
GLUCOSE SERPL-MCNC: 65 MG/DL (ref 65–140)
GLUCOSE SERPL-MCNC: 68 MG/DL (ref 65–140)
GLUCOSE UR STRIP-MCNC: NEGATIVE MG/DL
GRAN CASTS #/AREA URNS LPF: ABNORMAL /[LPF]
HCT VFR BLD AUTO: 34.4 % (ref 36.5–49.3)
HGB BLD-MCNC: 10.7 G/DL (ref 12–17)
HGB UR QL STRIP.AUTO: ABNORMAL
HYALINE CASTS #/AREA URNS LPF: ABNORMAL /LPF
IMM GRANULOCYTES # BLD AUTO: 0.03 THOUSAND/UL (ref 0–0.2)
IMM GRANULOCYTES NFR BLD AUTO: 1 % (ref 0–2)
KETONES UR STRIP-MCNC: ABNORMAL MG/DL
LEUKOCYTE ESTERASE UR QL STRIP: NEGATIVE
LIPASE SERPL-CCNC: 44 U/L (ref 11–82)
LYMPHOCYTES # BLD AUTO: 1.02 THOUSANDS/ÂΜL (ref 0.6–4.47)
LYMPHOCYTES NFR BLD AUTO: 16 % (ref 14–44)
MAGNESIUM SERPL-MCNC: 1.3 MG/DL (ref 1.9–2.7)
MCH RBC QN AUTO: 31.1 PG (ref 26.8–34.3)
MCHC RBC AUTO-ENTMCNC: 31.1 G/DL (ref 31.4–37.4)
MCV RBC AUTO: 100 FL (ref 82–98)
MONOCYTES # BLD AUTO: 0.53 THOUSAND/ÂΜL (ref 0.17–1.22)
MONOCYTES NFR BLD AUTO: 8 % (ref 4–12)
NEUTROPHILS # BLD AUTO: 4.73 THOUSANDS/ÂΜL (ref 1.85–7.62)
NEUTS SEG NFR BLD AUTO: 72 % (ref 43–75)
NITRITE UR QL STRIP: NEGATIVE
NON-SQ EPI CELLS URNS QL MICRO: ABNORMAL /HPF
NRBC BLD AUTO-RTO: 0 /100 WBCS
PH UR STRIP.AUTO: 6 [PH]
PLATELET # BLD AUTO: 135 THOUSANDS/UL (ref 149–390)
PMV BLD AUTO: 10.5 FL (ref 8.9–12.7)
POTASSIUM SERPL-SCNC: 4.9 MMOL/L (ref 3.5–5.3)
PROCALCITONIN SERPL-MCNC: 0.09 NG/ML
PROT SERPL-MCNC: 6.7 G/DL (ref 6.4–8.4)
PROT UR STRIP-MCNC: ABNORMAL MG/DL
QRS AXIS: 96 DEGREES
QRSD INTERVAL: 92 MS
QT INTERVAL: 436 MS
QTC INTERVAL: 483 MS
RBC # BLD AUTO: 3.44 MILLION/UL (ref 3.88–5.62)
RBC #/AREA URNS AUTO: ABNORMAL /HPF
SODIUM SERPL-SCNC: 142 MMOL/L (ref 135–147)
SP GR UR STRIP.AUTO: >=1.03 (ref 1–1.03)
T WAVE AXIS: -22 DEGREES
UROBILINOGEN UR STRIP-ACNC: 4 MG/DL
VENTRICULAR RATE: 74 BPM
WBC # BLD AUTO: 6.49 THOUSAND/UL (ref 4.31–10.16)
WBC #/AREA URNS AUTO: ABNORMAL /HPF

## 2025-05-24 PROCEDURE — 80053 COMPREHEN METABOLIC PANEL: CPT | Performed by: EMERGENCY MEDICINE

## 2025-05-24 PROCEDURE — 36415 COLL VENOUS BLD VENIPUNCTURE: CPT | Performed by: EMERGENCY MEDICINE

## 2025-05-24 PROCEDURE — 82948 REAGENT STRIP/BLOOD GLUCOSE: CPT

## 2025-05-24 PROCEDURE — 99223 1ST HOSP IP/OBS HIGH 75: CPT | Performed by: INTERNAL MEDICINE

## 2025-05-24 PROCEDURE — 99284 EMERGENCY DEPT VISIT MOD MDM: CPT

## 2025-05-24 PROCEDURE — 85025 COMPLETE CBC W/AUTO DIFF WBC: CPT | Performed by: EMERGENCY MEDICINE

## 2025-05-24 PROCEDURE — 71045 X-RAY EXAM CHEST 1 VIEW: CPT

## 2025-05-24 PROCEDURE — 96375 TX/PRO/DX INJ NEW DRUG ADDON: CPT

## 2025-05-24 PROCEDURE — 82746 ASSAY OF FOLIC ACID SERUM: CPT

## 2025-05-24 PROCEDURE — 82607 VITAMIN B-12: CPT

## 2025-05-24 PROCEDURE — 94640 AIRWAY INHALATION TREATMENT: CPT

## 2025-05-24 PROCEDURE — 93005 ELECTROCARDIOGRAM TRACING: CPT

## 2025-05-24 PROCEDURE — 96365 THER/PROPH/DIAG IV INF INIT: CPT

## 2025-05-24 PROCEDURE — 81001 URINALYSIS AUTO W/SCOPE: CPT | Performed by: EMERGENCY MEDICINE

## 2025-05-24 PROCEDURE — 84145 PROCALCITONIN (PCT): CPT

## 2025-05-24 PROCEDURE — 83690 ASSAY OF LIPASE: CPT | Performed by: EMERGENCY MEDICINE

## 2025-05-24 PROCEDURE — 82140 ASSAY OF AMMONIA: CPT | Performed by: EMERGENCY MEDICINE

## 2025-05-24 PROCEDURE — 99285 EMERGENCY DEPT VISIT HI MDM: CPT | Performed by: EMERGENCY MEDICINE

## 2025-05-24 PROCEDURE — 96361 HYDRATE IV INFUSION ADD-ON: CPT

## 2025-05-24 PROCEDURE — 83735 ASSAY OF MAGNESIUM: CPT | Performed by: EMERGENCY MEDICINE

## 2025-05-24 RX ORDER — GABAPENTIN 300 MG/1
300 CAPSULE ORAL 3 TIMES DAILY
Status: DISCONTINUED | OUTPATIENT
Start: 2025-05-24 | End: 2025-05-28 | Stop reason: HOSPADM

## 2025-05-24 RX ORDER — LIDOCAINE 50 MG/G
1 PATCH TOPICAL ONCE
Status: DISCONTINUED | OUTPATIENT
Start: 2025-05-24 | End: 2025-05-24 | Stop reason: HOSPADM

## 2025-05-24 RX ORDER — IPRATROPIUM BROMIDE AND ALBUTEROL SULFATE 2.5; .5 MG/3ML; MG/3ML
3 SOLUTION RESPIRATORY (INHALATION) ONCE
Status: COMPLETED | OUTPATIENT
Start: 2025-05-24 | End: 2025-05-24

## 2025-05-24 RX ORDER — PYRIDOXINE HCL (VITAMIN B6) 50 MG
100 TABLET ORAL DAILY
Status: DISCONTINUED | OUTPATIENT
Start: 2025-05-25 | End: 2025-05-28 | Stop reason: HOSPADM

## 2025-05-24 RX ORDER — INSULIN LISPRO 100 [IU]/ML
1-5 INJECTION, SOLUTION INTRAVENOUS; SUBCUTANEOUS
Status: DISCONTINUED | OUTPATIENT
Start: 2025-05-25 | End: 2025-05-28 | Stop reason: HOSPADM

## 2025-05-24 RX ORDER — LANOLIN ALCOHOL/MO/W.PET/CERES
100 CREAM (GRAM) TOPICAL DAILY
Status: DISCONTINUED | OUTPATIENT
Start: 2025-05-25 | End: 2025-05-28 | Stop reason: HOSPADM

## 2025-05-24 RX ORDER — MAGNESIUM SULFATE HEPTAHYDRATE 40 MG/ML
2 INJECTION, SOLUTION INTRAVENOUS ONCE
Status: COMPLETED | OUTPATIENT
Start: 2025-05-24 | End: 2025-05-24

## 2025-05-24 RX ORDER — ACETAMINOPHEN 325 MG/1
650 TABLET ORAL EVERY 6 HOURS PRN
Status: DISCONTINUED | OUTPATIENT
Start: 2025-05-24 | End: 2025-05-28 | Stop reason: HOSPADM

## 2025-05-24 RX ORDER — CEFTRIAXONE 1 G/50ML
1000 INJECTION, SOLUTION INTRAVENOUS ONCE
Status: COMPLETED | OUTPATIENT
Start: 2025-05-24 | End: 2025-05-24

## 2025-05-24 RX ORDER — PANTOPRAZOLE SODIUM 40 MG/1
40 TABLET, DELAYED RELEASE ORAL DAILY
Status: DISCONTINUED | OUTPATIENT
Start: 2025-05-25 | End: 2025-05-28 | Stop reason: HOSPADM

## 2025-05-24 RX ORDER — ASPIRIN 81 MG/1
81 TABLET, CHEWABLE ORAL DAILY
Status: DISCONTINUED | OUTPATIENT
Start: 2025-05-25 | End: 2025-05-28 | Stop reason: HOSPADM

## 2025-05-24 RX ORDER — METOPROLOL SUCCINATE 50 MG/1
TABLET, EXTENDED RELEASE ORAL
Status: DISCONTINUED
Start: 2025-05-24 | End: 2025-05-24 | Stop reason: WASHOUT

## 2025-05-24 RX ORDER — DEXTROSE MONOHYDRATE AND SODIUM CHLORIDE 5; .9 G/100ML; G/100ML
50 INJECTION, SOLUTION INTRAVENOUS CONTINUOUS
Status: DISCONTINUED | OUTPATIENT
Start: 2025-05-25 | End: 2025-05-25

## 2025-05-24 RX ORDER — INSULIN LISPRO 100 [IU]/ML
1-5 INJECTION, SOLUTION INTRAVENOUS; SUBCUTANEOUS
Status: DISCONTINUED | OUTPATIENT
Start: 2025-05-24 | End: 2025-05-28 | Stop reason: HOSPADM

## 2025-05-24 RX ORDER — DIAZEPAM 10 MG/2ML
10 INJECTION, SOLUTION INTRAMUSCULAR; INTRAVENOUS ONCE
Status: COMPLETED | OUTPATIENT
Start: 2025-05-24 | End: 2025-05-24

## 2025-05-24 RX ORDER — DEXTROSE MONOHYDRATE 25 G/50ML
25 INJECTION, SOLUTION INTRAVENOUS ONCE
Status: COMPLETED | OUTPATIENT
Start: 2025-05-24 | End: 2025-05-24

## 2025-05-24 RX ORDER — VANCOMYCIN HYDROCHLORIDE 125 MG/1
125 CAPSULE ORAL EVERY OTHER DAY
Status: DISCONTINUED | OUTPATIENT
Start: 2025-06-05 | End: 2025-05-25

## 2025-05-24 RX ORDER — FERROUS SULFATE 325(65) MG
325 TABLET ORAL
Status: DISCONTINUED | OUTPATIENT
Start: 2025-05-25 | End: 2025-05-28 | Stop reason: HOSPADM

## 2025-05-24 RX ORDER — VANCOMYCIN HYDROCHLORIDE 125 MG/1
125 CAPSULE ORAL EVERY 12 HOURS SCHEDULED
Status: DISCONTINUED | OUTPATIENT
Start: 2025-05-25 | End: 2025-05-25

## 2025-05-24 RX ORDER — RANOLAZINE 500 MG/1
500 TABLET, EXTENDED RELEASE ORAL 2 TIMES DAILY
Status: DISCONTINUED | OUTPATIENT
Start: 2025-05-25 | End: 2025-05-28 | Stop reason: HOSPADM

## 2025-05-24 RX ORDER — MAGNESIUM SULFATE HEPTAHYDRATE 40 MG/ML
4 INJECTION, SOLUTION INTRAVENOUS ONCE
Status: COMPLETED | OUTPATIENT
Start: 2025-05-25 | End: 2025-05-25

## 2025-05-24 RX ORDER — LANOLIN ALCOHOL/MO/W.PET/CERES
800 CREAM (GRAM) TOPICAL 2 TIMES DAILY
Status: DISCONTINUED | OUTPATIENT
Start: 2025-05-25 | End: 2025-05-28 | Stop reason: HOSPADM

## 2025-05-24 RX ORDER — CALCIUM CARBONATE 500 MG/1
1000 TABLET, CHEWABLE ORAL DAILY PRN
Status: DISCONTINUED | OUTPATIENT
Start: 2025-05-24 | End: 2025-05-25

## 2025-05-24 RX ORDER — NICOTINE 21 MG/24HR
1 PATCH, TRANSDERMAL 24 HOURS TRANSDERMAL DAILY
Status: DISCONTINUED | OUTPATIENT
Start: 2025-05-25 | End: 2025-05-28 | Stop reason: HOSPADM

## 2025-05-24 RX ORDER — DILTIAZEM HYDROCHLORIDE 60 MG/1
120 CAPSULE, EXTENDED RELEASE ORAL 2 TIMES DAILY
Status: DISCONTINUED | OUTPATIENT
Start: 2025-05-25 | End: 2025-05-28 | Stop reason: HOSPADM

## 2025-05-24 RX ORDER — DOCUSATE SODIUM 100 MG/1
100 CAPSULE, LIQUID FILLED ORAL 2 TIMES DAILY
Status: DISCONTINUED | OUTPATIENT
Start: 2025-05-25 | End: 2025-05-25

## 2025-05-24 RX ORDER — FOLIC ACID 1 MG/1
1 TABLET ORAL DAILY
Status: DISCONTINUED | OUTPATIENT
Start: 2025-05-25 | End: 2025-05-28 | Stop reason: HOSPADM

## 2025-05-24 RX ORDER — ALBUTEROL SULFATE 90 UG/1
2 INHALANT RESPIRATORY (INHALATION) EVERY 4 HOURS PRN
Status: DISCONTINUED | OUTPATIENT
Start: 2025-05-24 | End: 2025-05-25

## 2025-05-24 RX ORDER — VANCOMYCIN HYDROCHLORIDE 125 MG/1
125 CAPSULE ORAL DAILY
Status: DISCONTINUED | OUTPATIENT
Start: 2025-05-29 | End: 2025-05-25

## 2025-05-24 RX ORDER — TAMSULOSIN HYDROCHLORIDE 0.4 MG/1
0.4 CAPSULE ORAL
Status: DISCONTINUED | OUTPATIENT
Start: 2025-05-25 | End: 2025-05-28 | Stop reason: HOSPADM

## 2025-05-24 RX ORDER — NAPROXEN 500 MG/1
500 TABLET ORAL ONCE
Status: COMPLETED | OUTPATIENT
Start: 2025-05-24 | End: 2025-05-24

## 2025-05-24 RX ORDER — FLUTICASONE FUROATE AND VILANTEROL 200; 25 UG/1; UG/1
1 POWDER RESPIRATORY (INHALATION) DAILY
Status: DISCONTINUED | OUTPATIENT
Start: 2025-05-25 | End: 2025-05-28 | Stop reason: HOSPADM

## 2025-05-24 RX ORDER — SACCHAROMYCES BOULARDII 250 MG
250 CAPSULE ORAL 2 TIMES DAILY
Status: DISCONTINUED | OUTPATIENT
Start: 2025-05-25 | End: 2025-05-28 | Stop reason: HOSPADM

## 2025-05-24 RX ORDER — MAGNESIUM HYDROXIDE/ALUMINUM HYDROXICE/SIMETHICONE 120; 1200; 1200 MG/30ML; MG/30ML; MG/30ML
30 SUSPENSION ORAL EVERY 4 HOURS PRN
Status: DISCONTINUED | OUTPATIENT
Start: 2025-05-24 | End: 2025-05-28 | Stop reason: HOSPADM

## 2025-05-24 RX ORDER — POLYETHYLENE GLYCOL 3350 17 G/17G
17 POWDER, FOR SOLUTION ORAL DAILY
Status: DISCONTINUED | OUTPATIENT
Start: 2025-05-25 | End: 2025-05-25

## 2025-05-24 RX ORDER — ATORVASTATIN CALCIUM 40 MG/1
40 TABLET, FILM COATED ORAL DAILY
Status: DISCONTINUED | OUTPATIENT
Start: 2025-05-25 | End: 2025-05-28 | Stop reason: HOSPADM

## 2025-05-24 RX ADMIN — LIDOCAINE 1 PATCH: 700 PATCH TOPICAL at 01:37

## 2025-05-24 RX ADMIN — DEXTROSE MONOHYDRATE 25 G: 25 INJECTION, SOLUTION INTRAVENOUS at 16:43

## 2025-05-24 RX ADMIN — AZITHROMYCIN MONOHYDRATE 500 MG: 500 INJECTION, POWDER, LYOPHILIZED, FOR SOLUTION INTRAVENOUS at 23:45

## 2025-05-24 RX ADMIN — SODIUM CHLORIDE 1000 ML: 0.9 INJECTION, SOLUTION INTRAVENOUS at 16:20

## 2025-05-24 RX ADMIN — MAGNESIUM SULFATE HEPTAHYDRATE 2 G: 40 INJECTION, SOLUTION INTRAVENOUS at 16:51

## 2025-05-24 RX ADMIN — DIAZEPAM 10 MG: 10 INJECTION, SOLUTION INTRAMUSCULAR; INTRAVENOUS at 20:27

## 2025-05-24 RX ADMIN — CEFTRIAXONE 1000 MG: 1 INJECTION, SOLUTION INTRAVENOUS at 21:12

## 2025-05-24 RX ADMIN — METOPROLOL SUCCINATE 100 MG: 50 TABLET, EXTENDED RELEASE ORAL at 00:26

## 2025-05-24 RX ADMIN — NAPROXEN 500 MG: 500 TABLET ORAL at 01:38

## 2025-05-24 RX ADMIN — IPRATROPIUM BROMIDE AND ALBUTEROL SULFATE 3 ML: .5; 3 SOLUTION RESPIRATORY (INHALATION) at 16:43

## 2025-05-24 RX ADMIN — ACETAMINOPHEN 975 MG: 325 TABLET, FILM COATED ORAL at 00:26

## 2025-05-24 SDOH — ECONOMIC STABILITY - INCOME SECURITY: FINANCIAL INSECURITY: Z59.86

## 2025-05-24 NOTE — ED NOTES
Pt requested c-collar to be removed, provider aware and ok with removal.     Kelly Thornton RN  05/23/25 [id number]

## 2025-05-24 NOTE — ED PROVIDER NOTES
Time reflects when diagnosis was documented in both MDM as applicable and the Disposition within this note       Time User Action Codes Description Comment    5/24/2025  8:49 PM Nathen Masterson Add [F10.929] Alcohol intoxication (HCC)     5/24/2025  8:49 PM Nathen Masterson Add [R09.02] Hypoxia     5/24/2025  8:49 PM Nathen Masterson Modify [F10.929] Alcohol intoxication (HCC)     5/24/2025  8:49 PM Nathen Masterson Modify [R09.02] Hypoxia     5/25/2025 12:33 AM Mary Ann Stockton Add [F10.10] ETOH abuse           ED Disposition       ED Disposition   Admit    Condition   Stable    Date/Time   Sat May 24, 2025  8:49 PM    Comment   Case was discussed with [facility] and the patient's admission status was agreed to be Admission Status: observation status to the service of Dr. Thompson .               Assessment & Plan       Medical Decision Making  Patient evaluated in the ED with labs he was placed on nasal cannula oxygen improved his oxygenation pulse ox to 96%.  His blood pressure improved to systolic above 100 with some IV fluids.  Patient to be reassessed in a few hours once his alcohol wears off and he stays off O2 and his blood pressure remained stable he can be discharged otherwise if he gets worse to be admitted.  Care transitioned to Dr. Masterson    Problems Addressed:  Alcohol intoxication (HCC): acute illness or injury    Amount and/or Complexity of Data Reviewed  External Data Reviewed: notes.  Labs: ordered. Decision-making details documented in ED Course.    Risk  Prescription drug management.  Decision regarding hospitalization.             Medications   sodium chloride 0.9 % bolus 1,000 mL (0 mL Intravenous Stopped 5/24/25 1720)   ipratropium-albuterol (DUO-NEB) 0.5-2.5 mg/3 mL inhalation solution 3 mL (3 mL Nebulization Given 5/24/25 1643)   dextrose 50 % IV solution 25 g (25 g Intravenous Given 5/24/25 1643)   magnesium sulfate 2 g/50 mL IVPB (premix) 2 g (0 g  Intravenous Stopped 5/24/25 1751)   diazepam (VALIUM) injection 10 mg (10 mg Intravenous Given 5/24/25 2027)   cefTRIAXone (ROCEPHIN) IVPB (premix in dextrose) 1,000 mg 50 mL (has no administration in time range)       ED Risk Strat Scores                 CIWA-Ar Score       Row Name 05/25/25 0408 05/25/25 00:07:17 05/24/25 2017       CIWA-Ar    Blood Pressure -- -- 137/84    Pulse -- -- 82    Nausea and Vomiting 0 0 1    Tactile Disturbances 0 0 0    Tremor 4 5 4    Auditory Disturbances 0 0 0    Paroxysmal Sweats 0 0 0    Visual Disturbances 0 0 0    Anxiety 1 1 1    Headache, Fullness in Head 1 1 1    Agitation 0 0 0    Orientation and Clouding of Sensorium 0 0 0    CIWA-Ar Total 6 7 7                    No data recorded                            History of Present Illness       Chief Complaint   Patient presents with    Alcohol Intoxication       Past Medical History[1]   Past Surgical History[2]   Family History[3]   Social History[4]   E-Cigarette/Vaping    E-Cigarette Use Never User       E-Cigarette/Vaping Substances    Nicotine Yes     THC No     CBD No     Flavoring No     Other No     Unknown No       I have reviewed and agree with the history as documented.     65-year-old male well-known to our ED department in the hospital presents with alcohol intoxication denies any chest pain shortness of breath no evidence of trauma he was just seen here less than 24 hours ago had CT scans to rule out trauma which were unremarkable discharged.  Currently does not appear to be in withdrawal he is resting eyes closed he appears to be hypotensive hypoxic 1 episode however is very responsive to placing him on oxygen.      History provided by:  Patient   used: No        Review of Systems   Constitutional: Negative.         Alcohol intoxication   HENT: Negative.     Eyes: Negative.    Respiratory: Negative.     Cardiovascular: Negative.    Gastrointestinal: Negative.    Endocrine: Negative.     Genitourinary: Negative.    Musculoskeletal: Negative.    Skin: Negative.    Allergic/Immunologic: Negative.    Neurological: Negative.    Hematological: Negative.    Psychiatric/Behavioral: Negative.     All other systems reviewed and are negative.          Objective       ED Triage Vitals   Temperature Pulse Blood Pressure Respirations SpO2 Patient Position - Orthostatic VS   05/24/25 1549 05/24/25 1549 05/24/25 1549 05/24/25 1549 05/24/25 1549 05/24/25 1549   (!) 96.9 °F (36.1 °C) 85 (!) 86/51 14 (!) 88 % Lying      Temp Source Heart Rate Source BP Location FiO2 (%) Pain Score    05/24/25 1549 05/24/25 1549 05/24/25 1549 -- 05/24/25 2200    Oral Monitor Right arm  No Pain      Vitals      Date and Time Temp Pulse SpO2 Resp BP Pain Score FACES Pain Rating User   05/25/25 0758 99.2 °F (37.3 °C) -- -- 19 -- 4 --    05/25/25 0758 -- 102 96 % -- 146/74 -- -- [facility]   05/25/25 0611 98.9 °F (37.2 °C) 97 97 % 18 144/78 -- -- [facility]   05/25/25 0408 -- 86 -- -- 118/66 -- --    05/25/25 0400 -- 79 98 % -- -- -- --    05/25/25 0328 -- 78 88 % -- -- -- --    05/25/25 0315 -- -- 90 % -- -- -- --    05/25/25 0123 -- -- 98 % -- -- -- --    05/25/25 0054 -- 97 94 % -- -- -- --    05/25/25 0008 98 °F (36.7 °C) 87 98 % -- 158/86 -- -- DII   05/25/25 0007 -- 107 98 % -- 158/86 -- -- DII   05/25/25 0007 -- 103 98 % 18 158/86 -- -- DII   05/24/25 2200 -- -- -- -- -- No Pain --    05/24/25 2154 98.1 °F (36.7 °C) 93 97 % 18 116/72 -- -- DII   05/24/25 2100 -- 87 99 % 18 122/73 -- -- MB   05/24/25 2045 -- 85 99 % 18 -- -- -- MB   05/24/25 2030 -- 99 94 % 18 144/79 -- -- MB   05/24/25 2017 -- 82 -- -- 137/84 -- -- SW   05/24/25 2000 -- 82 99 % 16 137/84 -- -- MB   05/24/25 1900 -- 90 93 % 20 106/57 -- -- SW   05/24/25 1830 -- 72 98 % 17 112/56 -- -- CS   05/24/25 1800 -- 71 98 % 20 100/59 -- -- CS   05/24/25 1730 -- 73 98 % 17 117/62 -- -- CS   05/24/25 1700 -- 85 98 % 17 126/83 -- -- CS   05/24/25 1630 -- 71 97 % 16 102/64 --  -- SF   05/24/25 1627 -- -- 96 % -- -- -- -- CS   05/24/25 1615 -- 74 84 % 16 102/59 -- -- SF   05/24/25 1549 96.9 °F (36.1 °C) 85 88 % 14 86/51 -- -- MK            Physical Exam  Vitals and nursing note reviewed.   Constitutional:       Appearance: Normal appearance.   HENT:      Head: Normocephalic and atraumatic.      Nose: Nose normal.      Mouth/Throat:      Mouth: Mucous membranes are moist.     Eyes:      Extraocular Movements: Extraocular movements intact.      Pupils: Pupils are equal, round, and reactive to light.       Cardiovascular:      Rate and Rhythm: Normal rate and regular rhythm.   Pulmonary:      Effort: Pulmonary effort is normal.      Breath sounds: Normal breath sounds.   Abdominal:      General: Abdomen is flat. Bowel sounds are normal.      Palpations: Abdomen is soft.     Musculoskeletal:         General: Normal range of motion.      Cervical back: Normal range of motion and neck supple.     Skin:     General: Skin is warm.      Capillary Refill: Capillary refill takes less than 2 seconds.     Neurological:      General: No focal deficit present.      Mental Status: He is alert and oriented to person, place, and time. Mental status is at baseline.      Cranial Nerves: No cranial nerve deficit.      Sensory: No sensory deficit.      Motor: No weakness.      Coordination: Coordination normal.      Gait: Gait normal.      Deep Tendon Reflexes: Reflexes normal.     Psychiatric:         Mood and Affect: Mood normal.         Thought Content: Thought content normal.         Results Reviewed       Procedure Component Value Units Date/Time    UA (URINE) with reflex to Scope [[id number]]  (Abnormal) Collected: 05/24/25 2325    Lab Status: Final result Specimen: Urine, Other Updated: 05/24/25 2329     Color, UA Light Orange     Clarity, UA Clear     Specific Gravity, UA >=1.030     pH, UA 6.0     Leukocytes, UA Negative     Nitrite, UA Negative     Protein,  (2+) mg/dl      Glucose, UA Negative  mg/dl      Ketones, UA 10 (1+) mg/dl      Urobilinogen, UA 4.0 mg/dl      Bilirubin, UA Small     Occult Blood, UA Small    Ammonia [[id number]]  (Normal) Collected: 05/24/25 2229    Lab Status: Final result Specimen: Blood from Hand, Right Updated: 05/24/25 2255     Ammonia 33 umol/L     Procalcitonin [[id number]]  (Normal) Collected: 05/24/25 1614    Lab Status: Final result Specimen: Blood from Arm, Right Updated: 05/24/25 2141     Procalcitonin 0.09 ng/ml     Lipase [[id number]]  (Normal) Collected: 05/24/25 1614    Lab Status: Final result Specimen: Blood from Arm, Right Updated: 05/24/25 1845     Lipase 44 u/L     Comprehensive metabolic panel [[id number]]  (Abnormal) Collected: 05/24/25 1614    Lab Status: Final result Specimen: Blood from Arm, Right Updated: 05/24/25 1641     Sodium 142 mmol/L      Potassium 4.9 mmol/L      Chloride 100 mmol/L      CO2 24 mmol/L      ANION GAP 18 mmol/L      BUN 19 mg/dL      Creatinine 1.39 mg/dL      Glucose 65 mg/dL      Calcium 7.3 mg/dL      Corrected Calcium 7.9 mg/dL      AST 99 U/L      ALT 47 U/L      Alkaline Phosphatase 155 U/L      Total Protein 6.7 g/dL      Albumin 3.2 g/dL      Total Bilirubin 0.74 mg/dL      eGFR 53 ml/min/1.73sq m     Narrative:      [employer] guidelines for Chronic Kidney Disease (CKD):     Stage 1 with normal or high GFR (GFR > 90 mL/min/1.73 square meters)    Stage 2 Mild CKD (GFR = 60-89 mL/min/1.73 square meters)    Stage 3A Moderate CKD (GFR = 45-59 mL/min/1.73 square meters)    Stage 3B Moderate CKD (GFR = 30-44 mL/min/1.73 square meters)    Stage 4 Severe CKD (GFR = 15-29 mL/min/1.73 square meters)    Stage 5 End Stage CKD (GFR <15 mL/min/1.73 square meters)  Note: GFR calculation is accurate only with a steady state creatinine    Magnesium [[id number]]  (Abnormal) Collected: 05/24/25 1614    Lab Status: Final result Specimen: Blood from Arm, Right Updated: 05/24/25 1641     Magnesium 1.3 mg/dL     CBC and  differential [[id number]]  (Abnormal) Collected: 05/24/25 1614    Lab Status: Final result Specimen: Blood from Arm, Right Updated: 05/24/25 1639     WBC 6.49 Thousand/uL      RBC 3.44 Million/uL      Hemoglobin 10.7 g/dL      Hematocrit 34.4 %       fL      MCH 31.1 pg      MCHC 31.1 g/dL      RDW 18.6 %      MPV 10.5 fL      Platelets 135 Thousands/uL      nRBC 0 /100 WBCs      Segmented % 72 %      Immature Grans % 1 %      Lymphocytes % 16 %      Monocytes % 8 %      Eosinophils Relative 1 %      Basophils Relative 2 %      Absolute Neutrophils 4.73 Thousands/µL      Absolute Immature Grans 0.03 Thousand/uL      Absolute Lymphocytes 1.02 Thousands/µL      Absolute Monocytes 0.53 Thousand/µL      Eosinophils Absolute 0.08 Thousand/µL      Basophils Absolute 0.10 Thousands/µL     Fingerstick Glucose (POCT) [[id number]]  (Normal) Collected: 05/24/25 1603    Lab Status: Final result Specimen: Blood Updated: 05/24/25 1604     POC Glucose 68 mg/dl             XR chest 1 view portable    (Results Pending)       Procedures    ED Medication and Procedure Management   Prior to Admission Medications   Prescriptions Last Dose Informant Patient Reported? Taking?   Blood Glucose Monitoring Suppl (ONE TOUCH ULTRA MINI) w/Device KIT  Self Yes No   Sig: Use as directed   Blood Pressure Monitoring (Adult Blood Pressure Cuff Lg) KIT   No No   Sig: Use in the morning   ONETOUCH DELICA LANCETS FINE MISC  Self Yes No   Sig: in the morning and in the evening and before bedtime. Test.   Thiamine HCl (vitamin B-1) 100 MG TABS Past Week  No Yes   Sig: Take 1 tablet (100 mg total) by mouth daily   Thiamine Mononitrate (VITAMIN B1) 100 mg tablet Past Week  Yes Yes   Sig: Take 100 mg by mouth in the morning.   albuterol (PROVENTIL HFA,VENTOLIN HFA) 90 mcg/act inhaler Past Week  Yes Yes   Sig: Inhale 2 puffs every 4 (four) hours as needed for wheezing   albuterol (PROVENTIL HFA,VENTOLIN HFA) 90 mcg/act inhaler Past Week  Yes Yes   Sig:  Inhale 2 puffs every 6 (six) hours as needed for wheezing   albuterol (Ventolin HFA) 90 mcg/act inhaler Past Week  No Yes   Sig: Inhale 2 puffs every 4 (four) hours as needed for wheezing   aluminum-magnesium hydroxide 200-200 MG/5ML suspension Past Week  Yes Yes   Sig: Take 5 mL by mouth every 6 (six) hours as needed for heartburn   aluminum-magnesium hydroxide-simethicone (MAALOX) 4135-2659-378 mg/30 mL suspension Past Week  No Yes   Sig: Take 30 mL by mouth every 4 (four) hours as needed for indigestion or heartburn   apixaban (ELIQUIS) 5 mg Past Week  Yes Yes   Sig: Take 5 mg by mouth in the morning and 5 mg in the evening.   apixaban (Eliquis) 5 mg Past Week  Yes Yes   Sig: Take 5 mg by mouth in the morning and 5 mg in the evening.   aspirin 81 mg chewable tablet Past Week  Yes Yes   Sig: Chew 81 mg in the morning.   aspirin 81 mg chewable tablet Past Week  Yes Yes   Sig: Chew 81 mg in the morning.   atorvastatin (LIPITOR) 40 mg tablet Past Week  No Yes   Sig: Take 1 tablet (40 mg total) by mouth daily   atorvastatin (LIPITOR) 40 mg tablet Past Week  Yes Yes   Sig: Take 40 mg by mouth in the morning.   diltiazem (CARDIZEM SR) 120 mg 12 hr capsule Past Week  Yes Yes   Sig: Take 120 mg by mouth in the morning and 120 mg in the evening.   diltiazem (CARDIZEM SR) 120 mg 12 hr capsule Past Week  Yes Yes   Sig: Take 120 mg by mouth in the morning and 120 mg in the evening.   ferrous sulfate 325 (65 Fe) mg tablet Past Week  Yes Yes   Sig: Take 325 mg by mouth daily with breakfast   ferrous sulfate 325 (65 Fe) mg tablet Past Week  Yes Yes   Sig: Take 325 mg by mouth daily with breakfast   fluticasone-vilanterol (Breo Ellipta) 200-25 mcg/actuation inhaler Past Week  Yes Yes   Sig: Inhale 1 puff in the morning. Rinse mouth after use.   fluticasone-vilanterol 200-25 mcg/actuation inhaler Past Week  Yes Yes   Sig: Inhale 1 puff in the morning. Rinse mouth after use.   folic acid (FOLVITE) 1 mg tablet Past Week  Yes Yes    Sig: Take by mouth in the morning.   folic acid (FOLVITE) 1 mg tablet Past Week  Yes Yes   Sig: Take by mouth in the morning.   gabapentin (NEURONTIN) 300 mg capsule Past Week  Yes Yes   Sig: Take 300 mg by mouth in the morning and 300 mg in the evening and 300 mg before bedtime.   gabapentin (NEURONTIN) 300 mg capsule Past Week  Yes Yes   Sig: Take 300 mg by mouth in the morning and 300 mg in the evening and 300 mg before bedtime.   lidocaine (LIDODERM) 5 % Past Week  Yes Yes   Sig: Apply 1 patch topically in the morning. Remove & Discard patch within 12 hours or as directed by MD.   magnesium Oxide (MAG-OX) 400 mg TABS Past Week  No Yes   Sig: Take 2 tablets (800 mg total) by mouth 2 (two) times a day   magnesium oxide-pyridoxine (BEELITH) 362-20 MG TABS Past Week  Yes Yes   Sig: Take 1 tablet by mouth in the morning.   magnesium oxide-pyridoxine (BEELITH) 362-20 MG TABS Past Week  Yes Yes   Sig: Take 1 tablet by mouth in the morning.   metFORMIN (GLUCOPHAGE) 500 mg tablet Past Week  Yes Yes   Sig: Take 500 mg by mouth daily with breakfast   metFORMIN (GLUCOPHAGE) 500 mg tablet Past Week  Yes Yes   Sig: Take 500 mg by mouth in the morning and 500 mg in the evening. Take with meals.   metoprolol succinate (TOPROL-XL) 100 mg 24 hr tablet Past Week  Yes Yes   Sig: Take 150 mg by mouth every 12 (twelve) hours   metoprolol succinate (TOPROL-XL) 100 mg 24 hr tablet Past Week  No Yes   Sig: Take 1 tablet (100 mg total) by mouth 2 (two) times a day   naltrexone (REVIA) 50 mg tablet Past Week  Yes Yes   Sig: Take 50 mg by mouth in the morning.   naltrexone (REVIA) 50 mg tablet Past Week  No Yes   Sig: Take 1 tablet (50 mg total) by mouth daily   nicotine (NICODERM CQ) 21 mg/24 hr TD 24 hr patch Past Week  No Yes   Sig: Place 1 patch on the skin over 24 hours daily   nicotine (NICODERM CQ) 21 mg/24 hr TD 24 hr patch Past Week  Yes Yes   Sig: Place 1 patch on the skin every 24 hours   pantoprazole (PROTONIX) 40 mg tablet  Past Week  Yes Yes   Sig: Take 40 mg by mouth in the morning.   pantoprazole (PROTONIX) 40 mg tablet Past Week  Yes Yes   Sig: Take 40 mg by mouth in the morning.   pyridoxine (B-6) 100 MG tablet Past Week  Yes Yes   Sig: Take 100 mg by mouth in the morning.   pyridoxine (VITAMIN B6) 100 mg tablet Past Week  Yes Yes   Sig: Take 100 mg by mouth in the morning.   ranolazine (RANEXA) 500 mg 12 hr tablet Past Week  Yes Yes   Sig: Take 500 mg by mouth in the morning and 500 mg in the evening.   ranolazine (RANEXA) 500 mg 12 hr tablet Past Week  Yes Yes   Sig: Take 500 mg by mouth in the morning and 500 mg in the evening.   saccharomyces boulardii (FLORASTOR) 250 mg capsule Past Week  Yes Yes   Sig: Take 250 mg by mouth in the morning and 250 mg in the evening.   saccharomyces boulardii (FLORASTOR) 250 mg capsule Past Week  Yes Yes   Sig: Take 250 mg by mouth in the morning and 250 mg in the evening.   senna-docusate sodium (SENOKOT-S) 8.6-50 mg per tablet Past Week  Yes Yes   Sig: Take 1 tablet by mouth in the morning.   tamsulosin (FLOMAX) 0.4 mg Past Week  Yes Yes   Sig: Take 0.4 mg by mouth daily with dinner   tamsulosin (FLOMAX) 0.4 mg Past Week  Yes Yes   Sig: Take 0.4 mg by mouth daily with dinner   vancomycin (VANCOCIN) 125 MG capsule Past Week  No Yes   Sig: Take 1 capsule (125 mg total) by mouth every 6 (six) hours for 13 days, THEN 1 capsule (125 mg total) every 12 (twelve) hours for 7 days, THEN 1 capsule (125 mg total) daily for 7 days, THEN 1 capsule (125 mg total) every other day for 22 days.      Facility-Administered Medications: None     Current Discharge Medication List        CONTINUE these medications which have NOT CHANGED    Details   !! albuterol (PROVENTIL HFA,VENTOLIN HFA) 90 mcg/act inhaler Inhale 2 puffs every 4 (four) hours as needed for wheezing      !! albuterol (PROVENTIL HFA,VENTOLIN HFA) 90 mcg/act inhaler Inhale 2 puffs every 6 (six) hours as needed for wheezing      !! albuterol  (Ventolin HFA) 90 mcg/act inhaler Inhale 2 puffs every 4 (four) hours as needed for wheezing  Qty: 18 g, Refills: 0    Comments: Substitution to a formulary equivalent within the same pharmaceutical class is authorized.  Associated Diagnoses: COPD (chronic obstructive pulmonary disease) (HCC)      aluminum-magnesium hydroxide 200-200 MG/5ML suspension Take 5 mL by mouth every 6 (six) hours as needed for heartburn      aluminum-magnesium hydroxide-simethicone (MAALOX) 5307-3895-832 mg/30 mL suspension Take 30 mL by mouth every 4 (four) hours as needed for indigestion or heartburn  Qty: 769 mL, Refills: 0    Associated Diagnoses: Gastroesophageal reflux disease, unspecified whether esophagitis present      !! apixaban (ELIQUIS) 5 mg Take 5 mg by mouth in the morning and 5 mg in the evening.      !! apixaban (Eliquis) 5 mg Take 5 mg by mouth in the morning and 5 mg in the evening.      !! aspirin 81 mg chewable tablet Chew 81 mg in the morning.      !! aspirin 81 mg chewable tablet Chew 81 mg in the morning.      !! atorvastatin (LIPITOR) 40 mg tablet Take 1 tablet (40 mg total) by mouth daily  Qty: 90 tablet, Refills: 0    Associated Diagnoses: CAD (coronary artery disease)      !! atorvastatin (LIPITOR) 40 mg tablet Take 40 mg by mouth in the morning.      !! diltiazem (CARDIZEM SR) 120 mg 12 hr capsule Take 120 mg by mouth in the morning and 120 mg in the evening.      !! diltiazem (CARDIZEM SR) 120 mg 12 hr capsule Take 120 mg by mouth in the morning and 120 mg in the evening.      !! ferrous sulfate 325 (65 Fe) mg tablet Take 325 mg by mouth daily with breakfast      !! ferrous sulfate 325 (65 Fe) mg tablet Take 325 mg by mouth daily with breakfast      !! fluticasone-vilanterol (Breo Ellipta) 200-25 mcg/actuation inhaler Inhale 1 puff in the morning. Rinse mouth after use.      !! fluticasone-vilanterol 200-25 mcg/actuation inhaler Inhale 1 puff in the morning. Rinse mouth after use.      !! folic acid (FOLVITE)  1 mg tablet Take by mouth in the morning.      !! folic acid (FOLVITE) 1 mg tablet Take by mouth in the morning.      !! gabapentin (NEURONTIN) 300 mg capsule Take 300 mg by mouth in the morning and 300 mg in the evening and 300 mg before bedtime.      !! gabapentin (NEURONTIN) 300 mg capsule Take 300 mg by mouth in the morning and 300 mg in the evening and 300 mg before bedtime.      lidocaine (LIDODERM) 5 % Apply 1 patch topically in the morning. Remove & Discard patch within 12 hours or as directed by MD.      magnesium Oxide (MAG-OX) 400 mg TABS Take 2 tablets (800 mg total) by mouth 2 (two) times a day  Qty: 120 tablet, Refills: 0    Associated Diagnoses: Hypomagnesemia      !! magnesium oxide-pyridoxine (BEELITH) 362-20 MG TABS Take 1 tablet by mouth in the morning.      !! magnesium oxide-pyridoxine (BEELITH) 362-20 MG TABS Take 1 tablet by mouth in the morning.      !! metFORMIN (GLUCOPHAGE) 500 mg tablet Take 500 mg by mouth daily with breakfast      !! metFORMIN (GLUCOPHAGE) 500 mg tablet Take 500 mg by mouth in the morning and 500 mg in the evening. Take with meals.      !! metoprolol succinate (TOPROL-XL) 100 mg 24 hr tablet Take 150 mg by mouth every 12 (twelve) hours      !! metoprolol succinate (TOPROL-XL) 100 mg 24 hr tablet Take 1 tablet (100 mg total) by mouth 2 (two) times a day  Qty: 60 tablet, Refills: 1    Associated Diagnoses: A-fib (HCC); Hypertension      !! naltrexone (REVIA) 50 mg tablet Take 50 mg by mouth in the morning.      !! naltrexone (REVIA) 50 mg tablet Take 1 tablet (50 mg total) by mouth daily  Qty: 30 tablet, Refills: 0    Associated Diagnoses: Chronic alcohol use      !! nicotine (NICODERM CQ) 21 mg/24 hr TD 24 hr patch Place 1 patch on the skin over 24 hours daily  Qty: 28 patch, Refills: 0    Associated Diagnoses: Nicotine abuse      !! nicotine (NICODERM CQ) 21 mg/24 hr TD 24 hr patch Place 1 patch on the skin every 24 hours      !! pantoprazole (PROTONIX) 40 mg tablet Take  40 mg by mouth in the morning.      !! pantoprazole (PROTONIX) 40 mg tablet Take 40 mg by mouth in the morning.      !! pyridoxine (B-6) 100 MG tablet Take 100 mg by mouth in the morning.      !! pyridoxine (VITAMIN B6) 100 mg tablet Take 100 mg by mouth in the morning.      !! ranolazine (RANEXA) 500 mg 12 hr tablet Take 500 mg by mouth in the morning and 500 mg in the evening.      !! ranolazine (RANEXA) 500 mg 12 hr tablet Take 500 mg by mouth in the morning and 500 mg in the evening.      !! saccharomyces boulardii (FLORASTOR) 250 mg capsule Take 250 mg by mouth in the morning and 250 mg in the evening.      !! saccharomyces boulardii (FLORASTOR) 250 mg capsule Take 250 mg by mouth in the morning and 250 mg in the evening.      senna-docusate sodium (SENOKOT-S) 8.6-50 mg per tablet Take 1 tablet by mouth in the morning.      !! tamsulosin (FLOMAX) 0.4 mg Take 0.4 mg by mouth daily with dinner      !! tamsulosin (FLOMAX) 0.4 mg Take 0.4 mg by mouth daily with dinner      Thiamine HCl (vitamin B-1) 100 MG TABS Take 1 tablet (100 mg total) by mouth daily  Qty: 90 tablet, Refills: 0    Associated Diagnoses: Alcohol abuse      Thiamine Mononitrate (VITAMIN B1) 100 mg tablet Take 100 mg by mouth in the morning.      vancomycin (VANCOCIN) 125 MG capsule Take 1 capsule (125 mg total) by mouth every 6 (six) hours for 13 days, THEN 1 capsule (125 mg total) every 12 (twelve) hours for 7 days, THEN 1 capsule (125 mg total) daily for 7 days, THEN 1 capsule (125 mg total) every other day for 22 days.  Qty: 84 capsule, Refills: 0    Associated Diagnoses: Recurrent Clostridioides difficile diarrhea      Blood Glucose Monitoring Suppl (ONE TOUCH ULTRA MINI) w/Device KIT Use as directed  Refills: 0      Blood Pressure Monitoring (Adult Blood Pressure Cuff Lg) KIT Use in the morning  Qty: 1 kit, Refills: 0    Associated Diagnoses: Hypertension      ONETOUCH DELICA LANCETS FINE MISC in the morning and in the evening and before  bedtime. Test.  Refills: 0       !! - Potential duplicate medications found. Please discuss with provider.        No discharge procedures on file.  ED SEPSIS DOCUMENTATION   Time reflects when diagnosis was documented in both MDM as applicable and the Disposition within this note       Time User Action Codes Description Comment    5/24/2025  8:49 PM Nathen Masterson Add [F10.929] Alcohol intoxication (HCC)     5/24/2025  8:49 PM Nathen Masterson Add [R09.02] Hypoxia     5/24/2025  8:49 PM Nathen Masterson Modify [F10.929] Alcohol intoxication (HCC)     5/24/2025  8:49 PM Nathen Masterson Modify [R09.02] Hypoxia     5/25/2025 12:33 AM Mary Ann Stockton Add [F10.10] ETOH abuse                      [1]   Past Medical History:  Diagnosis Date    Acute on chronic kidney failure  (HCC)     Alcohol abuse     Alcohol withdrawal (HCC) 06/07/2019    Atrial fibrillation (HCC)     Cancer (HCC)     prostate ca,had radiation    Cardiac disease     stents,then triple bypass    COPD (chronic obstructive pulmonary disease) (HCC)     Coronary artery disease     ETOH abuse     Heart failure (HCC)     History of heart surgery     says [facility] bypass [facility]    Hx of heart artery stent     2014    Hyperlipidemia     Hypertension     Hyponatremia 10/17/2019    Hypovolemic shock (HCC) 12/22/2019    Lumbar spondylitis (HCC) 10/13/2022    Metabolic acidosis, increased anion gap 04/21/2021    Nasal bone fracture 10/10/2022    Prostate CA (HCC)     S/P CABG x 3     2004    Sleep apnea    [2]   Past Surgical History:  Procedure Laterality Date    CARDIAC CATHETERIZATION      2 stents    CORONARY ARTERY BYPASS GRAFT      CORONARY ARTERY BYPASS GRAFT  2004    HI ARTHRD ANT INTERBODY MIN DSC CRV BELOW C2 N/A 12/16/2020    Procedure: Anterior cervical discectomy with fusion C4-C7; Posterior cervical decompression and fusion C2-T2;  Surgeon: David Rowell MD;  Location: [facility];  Service: Neurosurgery    TONSILLECTOMY    "  [3]   Family History  Problem Relation Name Age of Onset    Diabetes Mother      Uterine cancer Mother      COPD Father      Hypertension Father     [4]   Social History  Tobacco Use    Smoking status: Every Day     Current packs/day: 1.50     Average packs/day: 1.5 packs/day for 40.0 years (60.0 ttl pk-yrs)     Types: Cigarettes    Smokeless tobacco: Never   Vaping Use    Vaping status: Never Used   Substance Use Topics    Alcohol use: Yes     Alcohol/week: 4.0 standard drinks of alcohol     Types: 4 Standard drinks or equivalent per week     Comment: \"quart a day\"    Drug use: No        Trish Madden, DO  05/25/25 0822    "

## 2025-05-25 PROBLEM — N18.9 ACUTE KIDNEY INJURY SUPERIMPOSED ON CHRONIC KIDNEY DISEASE  (HCC): Status: ACTIVE | Noted: 2025-05-07

## 2025-05-25 LAB
ALBUMIN SERPL BCG-MCNC: 3.2 G/DL (ref 3.5–5)
ALP SERPL-CCNC: 145 U/L (ref 34–104)
ALT SERPL W P-5'-P-CCNC: 39 U/L (ref 7–52)
ANION GAP SERPL CALCULATED.3IONS-SCNC: 16 MMOL/L (ref 4–13)
ANION GAP SERPL CALCULATED.3IONS-SCNC: 17 MMOL/L (ref 4–13)
ANISOCYTOSIS BLD QL SMEAR: PRESENT
AST SERPL W P-5'-P-CCNC: 63 U/L (ref 13–39)
BASOPHILS # BLD AUTO: 0.1 THOUSANDS/ÂΜL (ref 0–0.1)
BASOPHILS NFR BLD AUTO: 1 % (ref 0–1)
BILIRUB SERPL-MCNC: 0.68 MG/DL (ref 0.2–1)
BUN SERPL-MCNC: 17 MG/DL (ref 5–25)
BUN SERPL-MCNC: 20 MG/DL (ref 5–25)
CALCIUM ALBUM COR SERPL-MCNC: 8.1 MG/DL (ref 8.3–10.1)
CALCIUM SERPL-MCNC: 7.3 MG/DL (ref 8.4–10.2)
CALCIUM SERPL-MCNC: 7.5 MG/DL (ref 8.4–10.2)
CHLORIDE SERPL-SCNC: 101 MMOL/L (ref 96–108)
CHLORIDE SERPL-SCNC: 103 MMOL/L (ref 96–108)
CO2 SERPL-SCNC: 23 MMOL/L (ref 21–32)
CO2 SERPL-SCNC: 24 MMOL/L (ref 21–32)
CREAT SERPL-MCNC: 0.9 MG/DL (ref 0.6–1.3)
CREAT SERPL-MCNC: 1.13 MG/DL (ref 0.6–1.3)
EOSINOPHIL # BLD AUTO: 0.09 THOUSAND/ÂΜL (ref 0–0.61)
EOSINOPHIL NFR BLD AUTO: 1 % (ref 0–6)
ERYTHROCYTE [DISTWIDTH] IN BLOOD BY AUTOMATED COUNT: 18.4 % (ref 11.6–15.1)
ETHANOL SERPL-MCNC: <10 MG/DL
FOLATE SERPL-MCNC: 12.6 NG/ML
GFR SERPL CREATININE-BSD FRML MDRD: 68 ML/MIN/1.73SQ M
GFR SERPL CREATININE-BSD FRML MDRD: 90 ML/MIN/1.73SQ M
GLUCOSE P FAST SERPL-MCNC: 82 MG/DL (ref 65–99)
GLUCOSE SERPL-MCNC: 106 MG/DL (ref 65–140)
GLUCOSE SERPL-MCNC: 137 MG/DL (ref 65–140)
GLUCOSE SERPL-MCNC: 142 MG/DL (ref 65–140)
GLUCOSE SERPL-MCNC: 173 MG/DL (ref 65–140)
GLUCOSE SERPL-MCNC: 82 MG/DL (ref 65–140)
GLUCOSE SERPL-MCNC: 88 MG/DL (ref 65–140)
HCT VFR BLD AUTO: 29.2 % (ref 36.5–49.3)
HGB BLD-MCNC: 9.4 G/DL (ref 12–17)
IMM GRANULOCYTES # BLD AUTO: 0.03 THOUSAND/UL (ref 0–0.2)
IMM GRANULOCYTES NFR BLD AUTO: 0 % (ref 0–2)
LYMPHOCYTES # BLD AUTO: 0.96 THOUSANDS/ÂΜL (ref 0.6–4.47)
LYMPHOCYTES NFR BLD AUTO: 12 % (ref 14–44)
MACROCYTES BLD QL AUTO: PRESENT
MAGNESIUM SERPL-MCNC: 2.2 MG/DL (ref 1.9–2.7)
MCH RBC QN AUTO: 31.4 PG (ref 26.8–34.3)
MCHC RBC AUTO-ENTMCNC: 32.2 G/DL (ref 31.4–37.4)
MCV RBC AUTO: 98 FL (ref 82–98)
MONOCYTES # BLD AUTO: 0.55 THOUSAND/ÂΜL (ref 0.17–1.22)
MONOCYTES NFR BLD AUTO: 7 % (ref 4–12)
NEUTROPHILS # BLD AUTO: 6.23 THOUSANDS/ÂΜL (ref 1.85–7.62)
NEUTS SEG NFR BLD AUTO: 79 % (ref 43–75)
NRBC BLD AUTO-RTO: 0 /100 WBCS
PLATELET # BLD AUTO: 97 THOUSANDS/UL (ref 149–390)
PLATELET BLD QL SMEAR: ABNORMAL
PMV BLD AUTO: 10.2 FL (ref 8.9–12.7)
POTASSIUM SERPL-SCNC: 3.8 MMOL/L (ref 3.5–5.3)
POTASSIUM SERPL-SCNC: 3.9 MMOL/L (ref 3.5–5.3)
PROCALCITONIN SERPL-MCNC: 0.09 NG/ML
PROT SERPL-MCNC: 6.4 G/DL (ref 6.4–8.4)
RBC # BLD AUTO: 2.99 MILLION/UL (ref 3.88–5.62)
RBC MORPH BLD: PRESENT
SODIUM SERPL-SCNC: 141 MMOL/L (ref 135–147)
SODIUM SERPL-SCNC: 143 MMOL/L (ref 135–147)
VIT B12 SERPL-MCNC: 802 PG/ML (ref 180–914)
WBC # BLD AUTO: 7.96 THOUSAND/UL (ref 4.31–10.16)

## 2025-05-25 PROCEDURE — 94664 DEMO&/EVAL PT USE INHALER: CPT

## 2025-05-25 PROCEDURE — 82948 REAGENT STRIP/BLOOD GLUCOSE: CPT

## 2025-05-25 PROCEDURE — 94760 N-INVAS EAR/PLS OXIMETRY 1: CPT

## 2025-05-25 PROCEDURE — 85025 COMPLETE CBC W/AUTO DIFF WBC: CPT

## 2025-05-25 PROCEDURE — 94640 AIRWAY INHALATION TREATMENT: CPT

## 2025-05-25 PROCEDURE — 80048 BASIC METABOLIC PNL TOTAL CA: CPT

## 2025-05-25 PROCEDURE — 87493 C DIFF AMPLIFIED PROBE: CPT

## 2025-05-25 PROCEDURE — 80053 COMPREHEN METABOLIC PANEL: CPT

## 2025-05-25 PROCEDURE — 84145 PROCALCITONIN (PCT): CPT

## 2025-05-25 PROCEDURE — 83735 ASSAY OF MAGNESIUM: CPT

## 2025-05-25 PROCEDURE — 82077 ASSAY SPEC XCP UR&BREATH IA: CPT

## 2025-05-25 RX ORDER — MAGNESIUM SULFATE HEPTAHYDRATE 40 MG/ML
INJECTION, SOLUTION INTRAVENOUS
Status: DISPENSED
Start: 2025-05-25 | End: 2025-05-25

## 2025-05-25 RX ORDER — LEVALBUTEROL INHALATION SOLUTION 0.63 MG/3ML
0.63 SOLUTION RESPIRATORY (INHALATION) EVERY 8 HOURS PRN
Status: DISCONTINUED | OUTPATIENT
Start: 2025-05-25 | End: 2025-05-28 | Stop reason: HOSPADM

## 2025-05-25 RX ORDER — CALCIUM CARBONATE 500 MG/1
1000 TABLET, CHEWABLE ORAL DAILY
Status: DISCONTINUED | OUTPATIENT
Start: 2025-05-25 | End: 2025-05-28 | Stop reason: HOSPADM

## 2025-05-25 RX ORDER — SODIUM CHLORIDE, SODIUM LACTATE, POTASSIUM CHLORIDE, CALCIUM CHLORIDE 600; 310; 30; 20 MG/100ML; MG/100ML; MG/100ML; MG/100ML
50 INJECTION, SOLUTION INTRAVENOUS CONTINUOUS
Status: DISCONTINUED | OUTPATIENT
Start: 2025-05-25 | End: 2025-05-28 | Stop reason: HOSPADM

## 2025-05-25 RX ORDER — VANCOMYCIN HYDROCHLORIDE 125 MG/1
125 CAPSULE ORAL EVERY 6 HOURS SCHEDULED
Status: DISCONTINUED | OUTPATIENT
Start: 2025-05-25 | End: 2025-05-28 | Stop reason: HOSPADM

## 2025-05-25 RX ORDER — ALBUTEROL SULFATE 90 UG/1
2 INHALANT RESPIRATORY (INHALATION) EVERY 4 HOURS PRN
Status: DISCONTINUED | OUTPATIENT
Start: 2025-05-25 | End: 2025-05-28 | Stop reason: HOSPADM

## 2025-05-25 RX ADMIN — SODIUM CHLORIDE, SODIUM LACTATE, POTASSIUM CHLORIDE, AND CALCIUM CHLORIDE 50 ML/HR: .6; .31; .03; .02 INJECTION, SOLUTION INTRAVENOUS at 05:53

## 2025-05-25 RX ADMIN — LEVALBUTEROL HYDROCHLORIDE 0.63 MG: 0.63 SOLUTION RESPIRATORY (INHALATION) at 01:22

## 2025-05-25 RX ADMIN — ATORVASTATIN CALCIUM 40 MG: 40 TABLET, FILM COATED ORAL at 09:22

## 2025-05-25 RX ADMIN — ANTACID TABLETS 1000 MG: 500 TABLET, CHEWABLE ORAL at 09:23

## 2025-05-25 RX ADMIN — GABAPENTIN 300 MG: 300 CAPSULE ORAL at 21:36

## 2025-05-25 RX ADMIN — VANCOMYCIN HYDROCHLORIDE 125 MG: 125 CAPSULE ORAL at 00:17

## 2025-05-25 RX ADMIN — Medication 800 MG: at 09:22

## 2025-05-25 RX ADMIN — DILTIAZEM HYDROCHLORIDE 120 MG: 60 CAPSULE, EXTENDED RELEASE ORAL at 17:15

## 2025-05-25 RX ADMIN — GABAPENTIN 300 MG: 300 CAPSULE ORAL at 00:17

## 2025-05-25 RX ADMIN — Medication 250 MG: at 17:15

## 2025-05-25 RX ADMIN — THIAMINE HCL TAB 100 MG 100 MG: 100 TAB at 09:22

## 2025-05-25 RX ADMIN — PYRIDOXINE HCL TAB 50 MG 100 MG: 50 TAB at 09:23

## 2025-05-25 RX ADMIN — VANCOMYCIN HYDROCHLORIDE 125 MG: 125 CAPSULE ORAL at 09:27

## 2025-05-25 RX ADMIN — VANCOMYCIN HYDROCHLORIDE 125 MG: 125 CAPSULE ORAL at 17:15

## 2025-05-25 RX ADMIN — LEVALBUTEROL HYDROCHLORIDE 0.63 MG: 0.63 SOLUTION RESPIRATORY (INHALATION) at 19:52

## 2025-05-25 RX ADMIN — GABAPENTIN 300 MG: 300 CAPSULE ORAL at 09:23

## 2025-05-25 RX ADMIN — METOPROLOL SUCCINATE 150 MG: 100 TABLET, EXTENDED RELEASE ORAL at 00:17

## 2025-05-25 RX ADMIN — FERROUS SULFATE TAB 325 MG (65 MG ELEMENTAL FE) 325 MG: 325 (65 FE) TAB at 07:53

## 2025-05-25 RX ADMIN — NICOTINE 1 PATCH: 21 PATCH, EXTENDED RELEASE TRANSDERMAL at 09:27

## 2025-05-25 RX ADMIN — TAMSULOSIN HYDROCHLORIDE 0.4 MG: 0.4 CAPSULE ORAL at 17:15

## 2025-05-25 RX ADMIN — MAGNESIUM SULFATE 4 G: 4 INJECTION INTRAVENOUS at 01:10

## 2025-05-25 RX ADMIN — FOLIC ACID 1 MG: 1 TABLET ORAL at 09:23

## 2025-05-25 RX ADMIN — GABAPENTIN 300 MG: 300 CAPSULE ORAL at 17:15

## 2025-05-25 RX ADMIN — DILTIAZEM HYDROCHLORIDE 120 MG: 60 CAPSULE, EXTENDED RELEASE ORAL at 09:22

## 2025-05-25 RX ADMIN — APIXABAN 5 MG: 5 TABLET, FILM COATED ORAL at 09:23

## 2025-05-25 RX ADMIN — APIXABAN 5 MG: 5 TABLET, FILM COATED ORAL at 17:15

## 2025-05-25 RX ADMIN — ASPIRIN 81 MG: 81 TABLET, CHEWABLE ORAL at 09:22

## 2025-05-25 RX ADMIN — RANOLAZINE 500 MG: 500 TABLET, FILM COATED, EXTENDED RELEASE ORAL at 17:15

## 2025-05-25 RX ADMIN — METOPROLOL SUCCINATE 150 MG: 100 TABLET, EXTENDED RELEASE ORAL at 09:23

## 2025-05-25 RX ADMIN — PANTOPRAZOLE SODIUM 40 MG: 40 TABLET, DELAYED RELEASE ORAL at 09:23

## 2025-05-25 RX ADMIN — Medication 250 MG: at 09:23

## 2025-05-25 RX ADMIN — INSULIN LISPRO 1 UNITS: 100 INJECTION, SOLUTION INTRAVENOUS; SUBCUTANEOUS at 21:38

## 2025-05-25 RX ADMIN — B-COMPLEX W/ C & FOLIC ACID TAB 1 TABLET: TAB at 09:23

## 2025-05-25 RX ADMIN — FLUTICASONE FUROATE AND VILANTEROL TRIFENATATE 1 PUFF: 200; 25 POWDER RESPIRATORY (INHALATION) at 09:27

## 2025-05-25 RX ADMIN — RANOLAZINE 500 MG: 500 TABLET, FILM COATED, EXTENDED RELEASE ORAL at 09:23

## 2025-05-25 RX ADMIN — Medication 800 MG: at 17:15

## 2025-05-25 RX ADMIN — DEXTROSE AND SODIUM CHLORIDE 50 ML/HR: 5; .9 INJECTION, SOLUTION INTRAVENOUS at 00:43

## 2025-05-25 NOTE — ASSESSMENT & PLAN NOTE
Presented to ED with hypoxia 84 to 88% on room air  Requiring 2 to 4 L nasal cannula on admission  Tolerating titration down on O2  Denying cough or feeling short of breath  ED course: Rocephin x 1, azithromycin times  History of COPD, noncompliant with home medications    Plan:  Titrate O2 to keep SPO2 greater than 88%  Wean off O2 as able   Resume home inhalers  Incentive spirometry  Rest of plan per SIRS

## 2025-05-25 NOTE — ASSESSMENT & PLAN NOTE
Chronic has a history of hypo and hypertension  Presented to ED with hypotension    Status post IVF, BPs generally normalized  Continue to monitor

## 2025-05-25 NOTE — ASSESSMENT & PLAN NOTE
Presented to ED with hypoxia 84 to 88% on room air  Requiring 2 to 4 L nasal cannula on admission  Tolerating titration down on O2  Denying cough or feeling short of breath  History of COPD, noncompliant with home medications    ED course: Rocephin x 1, azithromycin times  CXR: No acute cardiopulmonary disease.     Patient satting 92-95% on 2L NC    Plan:  Titrate O2 to keep SPO2 greater than 88%  Wean off O2 as able   Resume home inhalers  Incentive spirometry  Rest of plan per SIRS

## 2025-05-25 NOTE — ASSESSMENT & PLAN NOTE
Lab Results   Component Value Date    HGBA1C 5.2 05/05/2025       Recent Labs     05/24/25  1603 05/24/25  2221   POCGLU 68 112       Blood Sugar Average: Last 72 hrs:  (P) 90  Chronic, well-controlled  Medications include metformin 500 mg twice daily  Patient noting that he had not been taking his medications or eating regularly for several days  Hypoglycemia noted on admission, improving with p.o. intake and IVF with D5    Plan:  Hold home metformin  Insulin sliding scale  Carb controlled diet  Hypoglycemia protocol

## 2025-05-25 NOTE — ASSESSMENT & PLAN NOTE
Previous history of recurrent C. Difficile  Started on p.o. vancomycin taper during previous hospitalization on 5/8. Patient reporting that he had not been compliant with vancomycin taper    Resumed during hospitalization  Dc'ed that   Started PO Vanc 125 mg Q6   Strict contact and hand hygiene  Monitor stool output

## 2025-05-25 NOTE — ASSESSMENT & PLAN NOTE
CO2 24 on admission, AG 18  UA with ketones  Creatinine 1.39 on admission  Suspect in setting of starvation as patient reportedly not been eating regularly    Plan:  Continue gentle IVF  Pending repeat BMP

## 2025-05-25 NOTE — ASSESSMENT & PLAN NOTE
Chronic, noted in ED on EKG  Rate on admission less than 100  Medications include Eliquis 5 mg twice daily, ASA 81 mg daily, Cardizem 100 mg twice daily, Toprol  mg  Noting that he not take his medications at home    Plan:  Resume home medications  Monitor on telemetry  Monitor rate control

## 2025-05-25 NOTE — ASSESSMENT & PLAN NOTE
Chronic, noting multiple falls at home  Patient reporting that he lives alone. History of alcohol abuse    PT OT ordered  Fall precautions

## 2025-05-25 NOTE — ASSESSMENT & PLAN NOTE
Chronic, stable.  Continues to smoke daily.    Ordered nicotine 21 mg patch  Encourage smoking cessation

## 2025-05-25 NOTE — ED CARE HANDOFF
Emergency Department Sign Out Note        Sign out and transfer of care from Dr. Madden. See Separate Emergency Department note.     The patient, Gregg Partida, was evaluated by the previous provider for hypoxia, alcohol intoxication, hypotension.    Workup Completed:      ED Course / Workup Pending (followup):  CBC, CMP, magnesium, lipase.  Patient without significant lab derangement.  Transient hypotension which responded to fluid bolus.  Chest x-ray demonstrating possible right lower lobe opacification which may represent aspiration versus pneumonia.  Patient treated empirically with Rocephin and azithromycin.  Patient briefly able to be transitioned off of oxygen however at this point very tremulous, states he feels anxious and feels like he is going into withdrawal.  Patient treated with Valium and despite normal mental status once again becoming hypoxic.  I discussed patient with the hospitalist and admitted patient for further evaluation and management.      No data recorded                          ED Course as of 05/24/25 2052   Sat May 24, 2025   1625 65M COPD, alcoholism, initially intoxicated, hypoxic, hypotensive     Procedures  Medical Decision Making  Amount and/or Complexity of Data Reviewed  Labs: ordered.  Radiology: ordered.    Risk  Prescription drug management.  Decision regarding hospitalization.            Disposition  Final diagnoses:   Alcohol intoxication (HCC)   Hypoxia     Time reflects when diagnosis was documented in both MDM as applicable and the Disposition within this note       Time User Action Codes Description Comment    5/24/2025  8:49 PM Nathen Masterson Add [F10.929] Alcohol intoxication (HCC)     5/24/2025  8:49 PM Nathen Masterson Add [R09.02] Hypoxia     5/24/2025  8:49 PM Nathen Masterson Modify [F10.929] Alcohol intoxication (HCC)     5/24/2025  8:49 PM Nathen Masterson Modify [R09.02] Hypoxia           ED Disposition       ED Disposition   Admit     Condition   Stable    Date/Time   Sat May 24, 2025  8:49 PM    Comment   Case was discussed with [facility] and the patient's admission status was agreed to be Admission Status: observation status to the service of Dr. Thompson .               Follow-up Information    None       Patient's Medications   Discharge Prescriptions    No medications on file     No discharge procedures on file.       ED Provider  Electronically Signed by     Nathen Masterson MD  05/24/25 2052

## 2025-05-25 NOTE — ASSESSMENT & PLAN NOTE
>>ASSESSMENT AND PLAN FOR COPD, SEVERITY TO BE DETERMINED (HCC) WRITTEN ON 5/25/2025  1:22 AM BY GERTRUDE RODNEY, DO    Chronic, not following with pulmonology  Home inhalers include albuterol and Breo  Patient noting that he had not been compliant with home medications or inhalers   Decreased breath sounds bilaterally with intermittent wheezing at bases    Plan:  Resume home inhalers  PRN Xopenex in setting of afib

## 2025-05-25 NOTE — ASSESSMENT & PLAN NOTE
Chronic, noted in ED on EKG  Rate on admission less than 100  Medications include Eliquis 5 mg twice daily, ASA 81 mg daily, Cardizem 100 mg twice daily, Toprol  mg  Noting that he not take his medications at home    Patient in Afib on Tele. HR avg 90s - 100s.     Plan:  Resume home medications  Monitor on telemetry  Monitor rate control

## 2025-05-25 NOTE — ASSESSMENT & PLAN NOTE
>>ASSESSMENT AND PLAN FOR TYPE 2 DIABETES MELLITUS WITH CHRONIC KIDNEY DISEASE (HCC) WRITTEN ON 5/25/2025  1:22 AM BY GERTRUDE RODNEY DO    Lab Results   Component Value Date    HGBA1C 5.2 05/05/2025       Recent Labs     05/24/25  1603 05/24/25  2221   POCGLU 68 112       Blood Sugar Average: Last 72 hrs:  (P) 90  Chronic, well-controlled  Medications include metformin 500 mg twice daily  Patient noting that he had not been taking his medications or eating regularly for several days  Hypoglycemia noted on admission, improving with p.o. intake and IVF with D5    Plan:  Hold home metformin  Insulin sliding scale  Carb controlled diet  Hypoglycemia protocol

## 2025-05-25 NOTE — ASSESSMENT & PLAN NOTE
Chronic, noting multiple falls at home  Patient reporting that he lives alone  History of alcohol abuse  PT OT ordered  Fall precautions

## 2025-05-25 NOTE — ASSESSMENT & PLAN NOTE
>>ASSESSMENT AND PLAN FOR ABNORMAL LFTS WRITTEN ON 5/25/2025  1:22 AM BY GERTRUDE RODNEY DO    AST 99, ALT 47, alk phos 155  Continue to monitor

## 2025-05-25 NOTE — ASSESSMENT & PLAN NOTE
Previous history of recurrent C. Difficile  Started on p.o. vancomycin taper during previous hospitalization on 5/8  Patient reporting that he had not been compliant with vancomycin taper  Resumed during hospitalization  Strict contact and hand hygiene

## 2025-05-25 NOTE — ASSESSMENT & PLAN NOTE
Chronic, not following with pulmonology  Home inhalers include albuterol and Breo  Patient noting that he had not been compliant with home medications or inhalers   Decreased breath sounds bilaterally with intermittent wheezing at bases    Plan:  Resume home inhalers  PRN Xopenex in setting of afib

## 2025-05-25 NOTE — ASSESSMENT & PLAN NOTE
>>ASSESSMENT AND PLAN FOR COPD, SEVERITY TO BE DETERMINED (HCC) WRITTEN ON 5/25/2025  6:47 AM BY PIYUSH PEREZ, DO    Chronic, not following with pulmonology  Home inhalers include albuterol and Breo  Patient noting that he had not been compliant with home medications or inhalers   Decreased breath sounds bilaterally with intermittent wheezing at bases    Plan:  Resume home inhalers  PRN Xopenex in setting of afib

## 2025-05-25 NOTE — ASSESSMENT & PLAN NOTE
Wt Readings from Last 3 Encounters:   05/13/25 79.1 kg (174 lb 4.8 oz)   05/08/25 86 kg (189 lb 9.5 oz)   05/02/25 83.9 kg (185 lb)     Chronic - appears euvolemic on admission  Echo (2/2025): EF 50 to 54%, wall motion could not be accurately assessed.  Indeterminate diastolic function.  RV cavity normal with reduced systolic function and abnormal tricuspid annular plane systolic excursion less than 1.7 cm.  LA severely dilated.  RA pressure estimated greater than 15 mmHg.  Estimated pulmonary artery systolic pressure is 63.9 mmHg.    Plan:  I/Os  Low sodium diet  Daily weights   Gentle IVF

## 2025-05-25 NOTE — ASSESSMENT & PLAN NOTE
Creatinine 1.39 on admission, baseline Cr 0.8-1  Patient reporting that he had not been eating for several days, presented with EtOH withdrawal  UA: 1+ ketones, small blood, 2+ protein, small bili, 4.0 urobilinogen, 2-4 hyaline casts, 0-3 granular casts     Labs: Cr 1.13 > 0.90    Plan:  Continue gentle IV rehydration w/ 50 mL/hr IV LR  Monitor I's and O's  Urinary retention protocol  Ordered PVR  Continue home Flomax

## 2025-05-25 NOTE — ASSESSMENT & PLAN NOTE
>>ASSESSMENT AND PLAN FOR TYPE 2 DIABETES MELLITUS WITH CHRONIC KIDNEY DISEASE (HCC) WRITTEN ON 5/25/2025  6:47 AM BY PIYUSH PEREZ DO    Lab Results   Component Value Date    HGBA1C 5.2 05/05/2025       Recent Labs     05/24/25  1603 05/24/25  2221   POCGLU 68 112       Blood Sugar Average: Last 72 hrs:  (P) 90  Chronic, well-controlled  Medications include metformin 500 mg twice daily  Patient noting that he had not been taking his medications or eating regularly for several days  Hypoglycemia noted on admission, improving with p.o. intake and IVF with D5    Plan:  Hold home metformin  Insulin sliding scale  Carb controlled diet  Hypoglycemia protocol

## 2025-05-25 NOTE — PROGRESS NOTES
Progress Note - Family Medicine   Name: Gregg Partida 65 y.o. male I MRN: [id number]  Unit/Bed#: 4 [place] 422-01 I Date of Admission: 2025   Date of Service: 2025 I Hospital Day: 0    Assessment & Plan  Alcohol withdrawal (HCC)  Chronic -history of alcohol abuse with recurrent admissions for alcohol withdrawal  Stating last drink was on a.m. of presentation  Noting some fine tremors on examination especially when trying to complete task like eating. Endorsing that he has not been taking his medications including naltrexone or vitamins for at least a week.  Additionally noting that he has not been eating regularly for several days    ED course: 1 g Rocephin, 10 mg Valium IV, 2 g mag sulfate, D5 25 g and 1 L normal saline bolus, DuoNeb x 1    Patient currently reporting that he would like to do in outpatient Alcohol  rehab    Plan:  [facility] protocol  Case management consulted, appreciate reccs  Pending B12, folate levels  Continue thiamine, folic acid, B6, multivitamin supplementation  Seizure precautions, Aspiration precautions, Fall precautions  Monitor mentation  Telemetry   SIRS (systemic inflammatory response syndrome) (HCC)  As evidenced by tachycardia, tachypnea -query source of infection  WBC WNL, Pro-Vlad WNL x 1  UA: Light orange, high specific gravity, protein, ketones, 0 below tension, bilirubin, occult blood  History of recurrent admissions for similar concerns  Last CXR with pneumonitis versus pneumonia in right lobe  ED course: Azithromycin x 1, Rocephin x 1  Previously given vancomycin p.o. for C. Difficile, patient endorses that he had not been taking  Denies current diarrhea  IVF with D5  CXR: No acute cardiopulmonary disease.   Proca.09 < 0.09    Plan:    Hold off Abx  If fever, or constitutional sxs., consider restarting  Acute respiratory failure with hypoxia (HCC)  Presented to ED with hypoxia 84 to 88% on room air  Requiring 2 to 4 L nasal cannula on admission  Tolerating titration  down on O2  Denying cough or feeling short of breath  History of COPD, noncompliant with home medications    ED course: Rocephin x 1, azithromycin times  CXR: No acute cardiopulmonary disease.     Patient satting 92-95% on 2L NC    Plan:  Titrate O2 to keep SPO2 greater than 88%  Wean off O2 as able   Resume home inhalers  Incentive spirometry  Rest of plan per SIRS  Acute kidney injury superimposed on chronic kidney disease  (HCC)  Creatinine 1.39 on admission, baseline Cr 0.8-1  Patient reporting that he had not been eating for several days, presented with EtOH withdrawal  UA: 1+ ketones, small blood, 2+ protein, small bili, 4.0 urobilinogen, 2-4 hyaline casts, 0-3 granular casts     Labs: Cr 1.13 > 0.90    Plan:  Continue gentle IV rehydration w/ 50 mL/hr IV LR  Monitor I's and O's  Urinary retention protocol  Ordered PVR  Continue home Flomax  Increased anion gap  Likely starvation ketosis as patient reportedly not been eating regularly  CO2 24 on admission, AG 18  UA with ketones  Creatinine 1.39 on admission    Plan:  Continue gentle IVF  Monitor BMP  COPD, severity to be determined (HCC)  Chronic, not following with pulmonology  Home inhalers include albuterol and Breo  Patient noting that he had not been compliant with home medications or inhalers   Decreased breath sounds bilaterally with intermittent wheezing at bases    Plan:  Resume home inhalers  PRN Xopenex in setting of afib   Type 2 diabetes mellitus with chronic kidney disease (HCC)  Lab Results   Component Value Date    HGBA1C 5.2 05/05/2025       Recent Labs     05/24/25  1603 05/24/25  2221   POCGLU 68 112       Blood Sugar Average: Last 72 hrs:  (P) 90  Chronic, well-controlled  Medications include metformin 500 mg twice daily  Patient noting that he had not been taking his medications or eating regularly for several days  Hypoglycemia noted on admission, improving with p.o. intake and IVF with D5    Plan:  Hold home metformin  Insulin sliding  scale  Carb controlled diet  Hypoglycemia protocol  Dyslipidemia  Chronic, last lipid panel (11/24): Total cholesterol 153, , LDL 87, HDL 46.  Home regimen: Atorvastatin 40 mg.    Continue home Lipitor  History of prostate cancer  Stable, history of prostate cancer in 2020 s/p resection 2021.  Home regimen: Flomax 0.4 mg daily  Noting that he had not been taking his home medications    Continue home regimen  Monitor I's and O's  CAD (coronary artery disease)  H/O CAD s/p CABG.  Home regimen: ASA 81 mg, ranolazine 500 mg every 12 hours  Noting that he not been taking his medications    Continue home regimen  Nicotine abuse  Chronic, stable.  Continues to smoke daily.    Ordered nicotine 21 mg patch  Encourage smoking cessation  Atrial fibrillation (HCC)  Chronic, noted in ED on EKG  Rate on admission less than 100  Medications include Eliquis 5 mg twice daily, ASA 81 mg daily, Cardizem 100 mg twice daily, Toprol  mg  Noting that he not take his medications at home    Patient in Afib on Tele. HR avg 90s - 100s.     Plan:  Resume home medications  Monitor on telemetry  Monitor rate control  Chronic heart failure with preserved ejection fraction (HCC)  Wt Readings from Last 3 Encounters:   05/25/25 74.5 kg (164 lb 4.8 oz)   05/13/25 79.1 kg (174 lb 4.8 oz)   05/08/25 86 kg (189 lb 9.5 oz)     Chronic - appears euvolemic on admission  Echo (2/2025): EF 50 to 54%, wall motion could not be accurately assessed.  Indeterminate diastolic function.  RV cavity normal with reduced systolic function and abnormal tricuspid annular plane systolic excursion less than 1.7 cm.  LA severely dilated.  RA pressure estimated greater than 15 mmHg.  Estimated pulmonary artery systolic pressure is 63.9 mmHg.    Plan:  I/Os  Low sodium diet  Daily weights   Gentle IVF   QT prolongation  On admission EKG QT C interval 483  Admission mag 1.3    Plan:  Monitoring on telemetry  Replete electrolytes as indicated   Avoid QT prolonging  medications  Ambulatory dysfunction  Chronic, noting multiple falls at home  Patient reporting that he lives alone. History of alcohol abuse    PT OT ordered  Fall precautions  GERD (gastroesophageal reflux disease)  Chronic, stable.  Home regimen: Protonix 40 mg, Maalox 30 mL every 4 as needed, aluminum magnesium hydroxide 5 mL every 6 hours as needed.    Resume home medication   Hypertension  Chronic has a history of hypo and hypertension  Presented to ED with hypotension    Status post IVF, BPs generally normalized  Continue to monitor  Recurrent Clostridioides difficile diarrhea  Previous history of recurrent C. Difficile  Started on p.o. vancomycin taper during previous hospitalization on 5/8. Patient reporting that he had not been compliant with vancomycin taper    Resumed during hospitalization  Dc'ed that   Started PO Vanc 125 mg Q6   Strict contact and hand hygiene  Monitor stool output   Electrolyte abnormality  Mag 1.3 on admission  Monitor and replete electrolytes as indicated  Abnormal LFTs  AST 99, ALT 47, alk phos 155  Continue to monitor    VTE Pharmacologic Prophylaxis: VTE Score: 7 High Risk (Score >/= 5) - Pharmacological DVT Prophylaxis Ordered: apixaban (Eliquis). Sequential Compression Devices Ordered.    Mobility:   Basic Mobility Inpatient Raw Score: 15  JH-HLM Goal: 4: Move to chair/commode  JH-HLM Achieved: 2: Bed activities/Dependent transfer  JH-HLM Goal NOT achieved. Continue with multidisciplinary rounding and encourage appropriate mobility to improve upon JH-HLM goals.    Patient Centered Rounds: I performed bedside rounds with nursing staff today.   Discussions with Specialists or Other Care Team Provider: None    Education and Discussions with Family / Patient: Patient declined call to .     Current Length of Stay: 0 day(s)  Current Patient Status: Observation   Certification Statement: The patient will continue to require additional inpatient hospital stay due to  alcohol intoxication   Discharge Plan: Anticipate discharge in 24-48 hrs to home.    Code Status: Level 1 - Full Code    Subjective   Patient was evaluated at bedside and found to be stable. Overnight, the patient had no events. Patient states he has been feeling chest discomfort and noticing his heart beating faster and slower.  Patient admits chest discomfort, fall (chronic)   Patient denies fever, chills, n/v/d     Objective :  Temp:  [96.9 °F (36.1 °C)-98.9 °F (37.2 °C)] 98.9 °F (37.2 °C)  HR:  [] 97  BP: ()/(51-86) 144/78  Resp:  [14-20] 18  SpO2:  [84 %-99 %] 97 %  O2 Device: Nasal cannula  Nasal Cannula O2 Flow Rate (L/min):  [2 L/min-4 L/min] 3 L/min    Body mass index is 21.09 kg/m².     Input and Output Summary (last 24 hours):     Intake/Output Summary (Last 24 hours) at 5/25/2025 0639  Last data filed at 5/25/2025 0100  Gross per 24 hour   Intake 1050 ml   Output 150 ml   Net 900 ml       Physical Exam  Constitutional:       General: He is not in acute distress.     Appearance: Normal appearance. He is normal weight. He is toxic-appearing. He is not ill-appearing.   HENT:      Head: Normocephalic and atraumatic.      Nose: Nose normal.      Mouth/Throat:      Mouth: Mucous membranes are moist.      Pharynx: Oropharynx is clear.     Eyes:      Conjunctiva/sclera: Conjunctivae normal.      Pupils: Pupils are equal, round, and reactive to light.       Cardiovascular:      Rate and Rhythm: Normal rate. Rhythm irregular.      Pulses: Normal pulses.      Heart sounds: Normal heart sounds.   Pulmonary:      Effort: Pulmonary effort is normal. No respiratory distress.      Breath sounds: Normal breath sounds.   Abdominal:      General: Abdomen is flat. Bowel sounds are normal.      Palpations: Abdomen is soft.      Tenderness: There is no abdominal tenderness. There is no guarding or rebound.     Musculoskeletal:         General: Normal range of motion.      Cervical back: Normal range of motion and  neck supple.     Skin:     General: Skin is warm and dry.     Neurological:      General: No focal deficit present.      Mental Status: He is alert and oriented to person, place, and time. Mental status is at baseline.     Psychiatric:         Mood and Affect: Mood normal.         Behavior: Behavior normal.         Thought Content: Thought content normal.         Judgment: Judgment normal.           Lines/Drains:        Telemetry:  Telemetry Orders (From admission, onward)               24 Hour Telemetry Monitoring  Continuous x 24 Hours (Telem)        Comments: Initiate telemetry after reviewing the following labs (CBC, CMP, Mg, Serum ETOH) for abnormal electrolytes and measure QT on EKG for QT prolongation   Expiring   Question:  Reason for 24 Hour Telemetry  Answer:  Alcohol withdrawal and CIWA >7, electrolyte abnormalities, abnormal ECG and/or heart disease                     Telemetry Reviewed: Atrial fibrillation. HR averaging 90s - 100s  Indication for Continued Telemetry Use: Arrthymias requiring medical therapy               Lab Results: I have reviewed the following results:   Results from last 7 days   Lab Units 05/24/25  1614 05/21/25  0159   WBC Thousand/uL 6.49 4.21*   HEMOGLOBIN g/dL 10.7* 10.9*   HEMATOCRIT % 34.4* 34.0*   PLATELETS Thousands/uL 135* 187   BANDS PCT %  --  1   SEGS PCT % 72  --    LYMPHO PCT % 16 41   MONO PCT % 8 8   EOS PCT % 1 3     Results from last 7 days   Lab Units 05/25/25  0100 05/24/25  1614   SODIUM mmol/L 143 142   POTASSIUM mmol/L 3.9 4.9   CHLORIDE mmol/L 103 100   CO2 mmol/L 23 24   BUN mg/dL 20 19   CREATININE mg/dL 1.13 1.39*   ANION GAP mmol/L 17* 18*   CALCIUM mg/dL 7.3* 7.3*   ALBUMIN g/dL  --  3.2*   TOTAL BILIRUBIN mg/dL  --  0.74   ALK PHOS U/L  --  155*   ALT U/L  --  47   AST U/L  --  99*   GLUCOSE RANDOM mg/dL 106 65         Results from last 7 days   Lab Units 05/24/25  2221 05/24/25  1603   POC GLUCOSE mg/dl 112 68         Results from last 7 days   Lab  Units 05/24/25  1614   PROCALCITONIN ng/ml 0.09       Recent Cultures (last 7 days):         Imaging Results Review: I personally reviewed the following image studies/reports in PACS and discussed pertinent findings with Radiology: chest xray. My interpretation of the radiology images/reports is: No acute cardiopulmonary disease..  Other Study Results Review: EKG was reviewed.     Last 24 Hours Medication List:     Current Facility-Administered Medications:     acetaminophen (TYLENOL) tablet 650 mg, Q6H PRN    albuterol (PROVENTIL HFA,VENTOLIN HFA) inhaler 2 puff, Q4H PRN    aluminum-magnesium hydroxide-simethicone (MAALOX) oral suspension 30 mL, Q4H PRN    apixaban (ELIQUIS) tablet 5 mg, BID    aspirin chewable tablet 81 mg, Daily    atorvastatin (LIPITOR) tablet 40 mg, Daily    calcium carbonate (TUMS) chewable tablet 1,000 mg, Daily PRN    diltiazem (CARDIZEM SR) 12 hr capsule 120 mg, BID    docusate sodium (COLACE) capsule 100 mg, BID    ferrous sulfate tablet 325 mg, Daily With Breakfast    fluticasone-vilanterol 200-25 mcg/actuation 1 puff, Daily    folic acid (FOLVITE) tablet 1 mg, Daily    gabapentin (NEURONTIN) capsule 300 mg, TID    insulin lispro (HumALOG/ADMELOG) 100 units/mL subcutaneous injection 1-5 Units, TID With Meals **AND** Fingerstick Glucose (POCT), 4x Daily AC and at bedtime    insulin lispro (HumALOG/ADMELOG) 100 units/mL subcutaneous injection 1-5 Units, HS    lactated ringers infusion, Continuous, Last Rate: 50 mL/hr (05/25/25 0553)    levalbuterol (XOPENEX) inhalation solution 0.63 mg, Q8H PRN    magnesium Oxide (MAG-OX) tablet 800 mg, BID    metoprolol succinate (TOPROL-XL) 24 hr tablet 150 mg, Q12H SEDA    multivitamin stress formula tablet 1 tablet, Daily    nicotine (NICODERM CQ) 21 mg/24 hr TD 24 hr patch 1 patch, Daily    pantoprazole (PROTONIX) EC tablet 40 mg, Daily    polyethylene glycol (MIRALAX) packet 17 g, Daily    pyridoxine (VITAMIN B6) tablet 100 mg, Daily    ranolazine  (RANEXA) 12 hr tablet 500 mg, BID    saccharomyces boulardii (FLORASTOR) capsule 250 mg, BID    tamsulosin (FLOMAX) capsule 0.4 mg, Daily With Dinner    thiamine tablet 100 mg, Daily    vancomycin (VANCOCIN) capsule 125 mg, Q12H SEDA **FOLLOWED BY** [START ON 5/29/2025] vancomycin (VANCOCIN) capsule 125 mg, Daily **FOLLOWED BY** [START ON 6/5/2025] vancomycin (VANCOCIN) capsule 125 mg, Every Other Day    Administrative Statements   Today, Patient Was Seen By: Nicky Messer, DO  I have spent a total time of 40 minutes in caring for this patient on the day of the visit/encounter including Diagnostic results, Prognosis, Risks and benefits of tx options, Instructions for management, Patient and family education, Importance of tx compliance, Risk factor reductions, Impressions, Counseling / Coordination of care, Documenting in the medical record, Reviewing/placing orders in the medical record (including tests, medications, and/or procedures), Obtaining or reviewing history  , and Communicating with other healthcare professionals .

## 2025-05-25 NOTE — ASSESSMENT & PLAN NOTE
Stable, history of prostate cancer in 2020 s/p resection 2021.  Home regimen: Flomax 0.4 mg daily  Noting that he had not been taking his home medications    Continue home regimen  Monitor I's and O's

## 2025-05-25 NOTE — ASSESSMENT & PLAN NOTE
Likely starvation ketosis as patient reportedly not been eating regularly  CO2 24 on admission, AG 18  UA with ketones  Creatinine 1.39 on admission    Plan:  Continue gentle IVF  Monitor BMP

## 2025-05-25 NOTE — ASSESSMENT & PLAN NOTE
Chronic, stable.  Home regimen: Protonix 40 mg, Maalox 30 mL every 4 as needed, aluminum magnesium hydroxide 5 mL every 6 hours as needed.    Resume home medication

## 2025-05-25 NOTE — ASSESSMENT & PLAN NOTE
Wt Readings from Last 3 Encounters:   05/25/25 74.5 kg (164 lb 4.8 oz)   05/13/25 79.1 kg (174 lb 4.8 oz)   05/08/25 86 kg (189 lb 9.5 oz)     Chronic - appears euvolemic on admission  Echo (2/2025): EF 50 to 54%, wall motion could not be accurately assessed.  Indeterminate diastolic function.  RV cavity normal with reduced systolic function and abnormal tricuspid annular plane systolic excursion less than 1.7 cm.  LA severely dilated.  RA pressure estimated greater than 15 mmHg.  Estimated pulmonary artery systolic pressure is 63.9 mmHg.    Plan:  I/Os  Low sodium diet  Daily weights   Gentle IVF

## 2025-05-25 NOTE — ASSESSMENT & PLAN NOTE
H/O CAD s/p CABG.  Home regimen: ASA 81 mg, ranolazine 500 mg every 12 hours  Noting that he not been taking his medications    Continue home regimen

## 2025-05-25 NOTE — ASSESSMENT & PLAN NOTE
As evidenced by tachycardia, tachypnea -query source of infection  WBC WNL, Pro-Vlad WNL x 1  UA: Light orange, high specific gravity, protein, ketones, 0 below tension, bilirubin, occult blood  History of recurrent admissions for similar concerns  Last CXR with pneumonitis versus pneumonia in right lobe  ED course: Azithromycin x 1, Rocephin x 1  Previously given vancomycin p.o. for C. Difficile, patient endorses that he had not been taking  Denies current diarrhea  IVF with D5    Plan:  Pending CXR interpretation  Repeat procal ordered  Consider continuing abx

## 2025-05-25 NOTE — ASSESSMENT & PLAN NOTE
Chronic, last lipid panel (11/24): Total cholesterol 153, , LDL 87, HDL 46.  Home regimen: Atorvastatin 40 mg.    Continue home Lipitor

## 2025-05-25 NOTE — ASSESSMENT & PLAN NOTE
Creatinine 1.39 on admission, baseline Cr 0.8-1  Patient reporting that he had not been eating for several days, presented with EtOH withdrawal    Plan:  Continue gentle IVF  Monitor I's and O's  Urinary retention protocol  Ordered PVR  Continue home Flomax

## 2025-05-25 NOTE — HOSPITAL COURSE
65-year-old male with past medical history of diabetes, CHF, CAD, atrial fibrillation, alcohol abuse, dyslipidemia, GERD, CABG, prostate cancer presented with alcohol intoxication. Patient's last drink was on the day of admission.  Patient also reported fall 2 days prior. Patient was admitted for alcohol abuse with withdrawal and CIWA protocol was started. While here patient also had multiple bouts of diarrhea. Stool workup was performed which showed he was positive for C. Dif., and patient's Vancomycin taper was switched to Vancomycin 125 mg Q6 for 2 weeks total. Patient was supposed to be on Vancomycin taper prior to admission for the same reason, but admitted he was not taking it.  Patient detoxed while here, and once evaluated by PT/OT and deemed safe for discharge, he was discharged home.  Patient was discharged with the 2 weeks total of vancomycin and also magnesium tablets.

## 2025-05-25 NOTE — ASSESSMENT & PLAN NOTE
>>ASSESSMENT AND PLAN FOR ABNORMAL LFTS WRITTEN ON 5/25/2025  6:47 AM BY PIYUSH PREEZ,     AST 99, ALT 47, alk phos 155  Continue to monitor

## 2025-05-25 NOTE — ASSESSMENT & PLAN NOTE
Chronic -history of alcohol abuse with recurrent admissions for alcohol withdrawal  Stating last drink was on a.m. of presentation  Noting some fine tremors on examination especially when trying to complete task like eating. Endorsing that he has not been taking his medications including naltrexone or vitamins for at least a week.  Additionally noting that he has not been eating regularly for several days    ED course: 1 g Rocephin, 10 mg Valium IV, 2 g mag sulfate, D5 25 g and 1 L normal saline bolus, DuoNeb x 1    Patient currently reporting that he would like to do in outpatient Alcohol  rehab    Plan:  [facility] protocol  Case management consulted, appreciate reccs  Pending B12, folate levels  Continue thiamine, folic acid, B6, multivitamin supplementation  Seizure precautions, Aspiration precautions, Fall precautions  Monitor mentation  Telemetry

## 2025-05-25 NOTE — H&P
H&P - [facility] Medicine   Name: Gregg Partida 65 y.o. male I MRN: [id number]  Unit/Bed#: [address] [place] [address] I Date of Admission: 5/24/2025   Date of Service: 5/25/2025 I Hospital Day: 0     Assessment & Plan  Alcohol withdrawal (HCC)  Chronic -history of alcohol abuse with recurrent admissions for alcohol withdrawal  Stating last drink was on a.m. of presentation  Noting some fine tremors on examination especially when trying to complete task like eating  ED course: 1 g Rocephin, 10 mg Valium IV, 2 g mag sulfate, D5 25 g and 1 L normal saline bolus, DuoNeb x 1  Endorsing that he has not been taking his medications including naltrexone or vitamins for at least a week  Additionally noting that he has not been eating regularly for several days  Patient currently reporting that he would like to do in outpatient rehab    Plan:  [facility] protocol  Case management consulted  Pending B12, folate levels  Continue thiamine, folic acid, B6, multivitamin supplementation  Seizure precautions  Aspiration precautions  Fall precautions  Monitor mentation  SIRS (systemic inflammatory response syndrome) (HCC)  As evidenced by tachycardia, tachypnea -query source of infection  WBC WNL, Pro-Vlad WNL x 1  UA: Light orange, high specific gravity, protein, ketones, 0 below tension, bilirubin, occult blood  History of recurrent admissions for similar concerns  Last CXR with pneumonitis versus pneumonia in right lobe  ED course: Azithromycin x 1, Rocephin x 1  Previously given vancomycin p.o. for C. Difficile, patient endorses that he had not been taking  Denies current diarrhea  IVF with D5    Plan:  Pending CXR interpretation  Repeat procal ordered  Consider continuing abx   Acute respiratory failure with hypoxia (HCC)  Presented to ED with hypoxia 84 to 88% on room air  Requiring 2 to 4 L nasal cannula on admission  Tolerating titration down on O2  Denying cough or feeling short of breath  ED course: Rocephin x 1, azithromycin times  History of  COPD, noncompliant with home medications    Plan:  Titrate O2 to keep SPO2 greater than 88%  Wean off O2 as able   Resume home inhalers  Incentive spirometry  Rest of plan per SIRS  COPD, severity to be determined (HCC)  Chronic, not following with pulmonology  Home inhalers include albuterol and Breo  Patient noting that he had not been compliant with home medications or inhalers   Decreased breath sounds bilaterally with intermittent wheezing at bases    Plan:  Resume home inhalers  PRN Xopenex in setting of afib   Type 2 diabetes mellitus with chronic kidney disease (HCC)  Lab Results   Component Value Date    HGBA1C 5.2 05/05/2025       Recent Labs     05/24/25  1603 05/24/25  2221   POCGLU 68 112       Blood Sugar Average: Last 72 hrs:  (P) 90  Chronic, well-controlled  Medications include metformin 500 mg twice daily  Patient noting that he had not been taking his medications or eating regularly for several days  Hypoglycemia noted on admission, improving with p.o. intake and IVF with D5    Plan:  Hold home metformin  Insulin sliding scale  Carb controlled diet  Hypoglycemia protocol  Dyslipidemia  Chronic, last lipid panel (11/24): Total cholesterol 153, , LDL 87, HDL 46.  Home regimen: Atorvastatin 40 mg.    Continue home Lipitor  History of prostate cancer  Stable, history of prostate cancer in 2020 s/p resection 2021.  Home regimen: Flomax 0.4 mg daily  Noting that he had not been taking his home medications    Continue home regimen  Monitor I's and O's  CAD (coronary artery disease)  H/O CAD s/p CABG.  Home regimen: ASA 81 mg, ranolazine 500 mg every 12 hours  Noting that he not been taking his medications    Continue home regimen  Nicotine abuse  Chronic, stable.  Continues to smoke daily.    Ordered nicotine 21 mg patch  Encourage smoking cessation  Atrial fibrillation (HCC)  Chronic, noted in ED on EKG  Rate on admission less than 100  Medications include Eliquis 5 mg twice daily, ASA 81 mg  daily, Cardizem 100 mg twice daily, Toprol  mg  Noting that he not take his medications at home    Plan:  Resume home medications  Monitor on telemetry  Monitor rate control  GERD (gastroesophageal reflux disease)  Chronic, stable.  Home regimen: Protonix 40 mg, Maalox 30 mL every 4 as needed, aluminum magnesium hydroxide 5 mL every 6 hours as needed.    Resume home medication   Chronic heart failure with preserved ejection fraction (HCC)  Wt Readings from Last 3 Encounters:   05/13/25 79.1 kg (174 lb 4.8 oz)   05/08/25 86 kg (189 lb 9.5 oz)   05/02/25 83.9 kg (185 lb)     Chronic - appears euvolemic on admission  Echo (2/2025): EF 50 to 54%, wall motion could not be accurately assessed.  Indeterminate diastolic function.  RV cavity normal with reduced systolic function and abnormal tricuspid annular plane systolic excursion less than 1.7 cm.  LA severely dilated.  RA pressure estimated greater than 15 mmHg.  Estimated pulmonary artery systolic pressure is 63.9 mmHg.    Plan:  I/Os  Low sodium diet  Daily weights   Gentle IVF   QT prolongation  On admission EKG QT C interval 483  Admission mag 1.3    Plan:  Monitoring on telemetry  Replete electrolytes as indicated   Avoid QT prolonging medications  Increased anion gap  CO2 24 on admission, AG 18  UA with ketones  Creatinine 1.39 on admission  Suspect in setting of starvation as patient reportedly not been eating regularly    Plan:  Continue gentle IVF  Pending repeat BMP  Acute kidney injury superimposed on chronic kidney disease  (HCC)  Creatinine 1.39 on admission, baseline Cr 0.8-1  Patient reporting that he had not been eating for several days, presented with EtOH withdrawal    Plan:  Continue gentle IVF  Monitor I's and O's  Urinary retention protocol  Ordered PVR  Continue home Flomax  Ambulatory dysfunction  Chronic, noting multiple falls at home  Patient reporting that he lives alone  History of alcohol abuse  PT OT ordered  Fall  "precautions  Hypertension  Chronic has a history of hypo and hypertension  Presented to ED with hypotension  Status post IVF, BPs generally normalized  Continue to monitor  Recurrent Clostridioides difficile diarrhea  Previous history of recurrent C. Difficile  Started on p.o. vancomycin taper during previous hospitalization on 5/8  Patient reporting that he had not been compliant with vancomycin taper  Resumed during hospitalization  Strict contact and hand hygiene  Electrolyte abnormality  Mag 1.3 on admission  Monitor and replete electrolytes as indicated  Abnormal LFTs  AST 99, ALT 47, alk phos 155  Continue to monitor      VTE Pharmacologic Prophylaxis: VTE Score: 7 High Risk (Score >/= 5) - Pharmacological DVT Prophylaxis Ordered: apixaban (Eliquis). Sequential Compression Devices Ordered.  Code Status: Full  Discussion with family: Patient declined call to .     Anticipated Length of Stay: Patient will be admitted on an observation basis with an anticipated length of stay of less than 2 midnights secondary to alcohol detox.    History of Present Illness   Chief Complaint: Dane Partida is a 65 y.o. male with a PMH of type 2 diabetes, CHF, CAD, alcohol abuse, dyslipidemia, A-fib, GERD, history of CABG, history of prostate cancer who presents with alcohol intoxication, noting last drink was on day of presentation.  Reporting that he drinks a quart of vodka daily.  Endorses frequent falls at home, he was unable to get up and called an ambulance.  In the ED, he endorses a headache but no other symptoms at time of admission.  He has not taken his medications in a few days because the bottles rolled under the dresser.  Additionally noting the get up it is taking his inhalers in \"quite some time\".  In the ED he was found to be hypoxic at 84% on room air, requiring 2 to 4 L nasal cannula supplementation, eventually able to be weaned down to room air.  ED providers noted tremor on exam, some " agitation, requiring Valium.    Review of Systems   Constitutional:  Negative for chills, fatigue and fever.   HENT:  Negative for sore throat.    Respiratory:  Negative for shortness of breath.    Cardiovascular:  Negative for chest pain and leg swelling.   Gastrointestinal:  Negative for abdominal pain, constipation, diarrhea, nausea and vomiting.   Genitourinary:  Negative for difficulty urinating, dysuria and hematuria.   Musculoskeletal:  Negative for arthralgias and back pain.   Skin:  Negative for rash and wound.   Neurological:  Positive for headaches. Negative for dizziness, weakness and light-headedness.       Historical Information   Past Medical History[1]  Past Surgical History[2]  Social History[3]  E-Cigarette/Vaping    E-Cigarette Use Never User      E-Cigarette/Vaping Substances    Nicotine Yes     THC No     CBD No     Flavoring No     Other No     Unknown No      Family History[4]  Social History:  Marital Status: Single   Occupation: Unemployed  Patient Pre-hospital Living Situation: Home  Patient Pre-hospital Level of Mobility: walks  Patient Pre-hospital Diet Restrictions: None    Meds/Allergies   I have reviewed home medications with patient personally.  Prior to Admission medications    Medication Sig Start Date End Date Taking? Authorizing Provider   albuterol (PROVENTIL HFA,VENTOLIN HFA) 90 mcg/act inhaler Inhale 2 puffs every 4 (four) hours as needed for wheezing   Yes Historical Provider, MD   albuterol (PROVENTIL HFA,VENTOLIN HFA) 90 mcg/act inhaler Inhale 2 puffs every 6 (six) hours as needed for wheezing   Yes Historical Provider, MD   albuterol (Ventolin HFA) 90 mcg/act inhaler Inhale 2 puffs every 4 (four) hours as needed for wheezing 4/16/25  Yes Rich Finn MD   aluminum-magnesium hydroxide 200-200 MG/5ML suspension Take 5 mL by mouth every 6 (six) hours as needed for heartburn   Yes Historical Provider, MD   aluminum-magnesium hydroxide-simethicone (MAALOX) 5066-3382-407  mg/30 mL suspension Take 30 mL by mouth every 4 (four) hours as needed for indigestion or heartburn 4/16/25  Yes Rich Finn MD   apixaban (ELIQUIS) 5 mg Take 5 mg by mouth in the morning and 5 mg in the evening.   Yes Historical Provider, MD   apixaban (Eliquis) 5 mg Take 5 mg by mouth in the morning and 5 mg in the evening.   Yes Historical Provider, MD   aspirin 81 mg chewable tablet Chew 81 mg in the morning.   Yes Historical Provider, MD   aspirin 81 mg chewable tablet Chew 81 mg in the morning.   Yes Historical Provider, MD   atorvastatin (LIPITOR) 40 mg tablet Take 1 tablet (40 mg total) by mouth daily 4/16/25  Yes Rich Finn MD   atorvastatin (LIPITOR) 40 mg tablet Take 40 mg by mouth in the morning.   Yes Historical Provider, MD   diltiazem (CARDIZEM SR) 120 mg 12 hr capsule Take 120 mg by mouth in the morning and 120 mg in the evening.   Yes Historical Provider, MD   diltiazem (CARDIZEM SR) 120 mg 12 hr capsule Take 120 mg by mouth in the morning and 120 mg in the evening.   Yes Historical Provider, MD   ferrous sulfate 325 (65 Fe) mg tablet Take 325 mg by mouth daily with breakfast   Yes Historical Provider, MD   ferrous sulfate 325 (65 Fe) mg tablet Take 325 mg by mouth daily with breakfast   Yes Historical Provider, MD   fluticasone-vilanterol (Breo Ellipta) 200-25 mcg/actuation inhaler Inhale 1 puff in the morning. Rinse mouth after use.   Yes Historical Provider, MD   fluticasone-vilanterol 200-25 mcg/actuation inhaler Inhale 1 puff in the morning. Rinse mouth after use.   Yes Historical Provider, MD   folic acid (FOLVITE) 1 mg tablet Take by mouth in the morning.   Yes Historical Provider, MD   folic acid (FOLVITE) 1 mg tablet Take by mouth in the morning.   Yes Historical Provider, MD   gabapentin (NEURONTIN) 300 mg capsule Take 300 mg by mouth in the morning and 300 mg in the evening and 300 mg before bedtime.   Yes Historical Provider, MD   gabapentin (NEURONTIN) 300 mg capsule Take  300 mg by mouth in the morning and 300 mg in the evening and 300 mg before bedtime.   Yes Historical Provider, MD   lidocaine (LIDODERM) 5 % Apply 1 patch topically in the morning. Remove & Discard patch within 12 hours or as directed by MD.   Yes Historical Provider, MD   magnesium Oxide (MAG-OX) 400 mg TABS Take 2 tablets (800 mg total) by mouth 2 (two) times a day 4/16/25  Yes Rich Finn MD   magnesium oxide-pyridoxine (BEELITH) 362-20 MG TABS Take 1 tablet by mouth in the morning.   Yes Historical Provider, MD   magnesium oxide-pyridoxine (BEELITH) 362-20 MG TABS Take 1 tablet by mouth in the morning.   Yes Historical Provider, MD   metFORMIN (GLUCOPHAGE) 500 mg tablet Take 500 mg by mouth daily with breakfast   Yes Historical Provider, MD   metFORMIN (GLUCOPHAGE) 500 mg tablet Take 500 mg by mouth in the morning and 500 mg in the evening. Take with meals.   Yes Historical Provider, MD   metoprolol succinate (TOPROL-XL) 100 mg 24 hr tablet Take 150 mg by mouth every 12 (twelve) hours   Yes Historical Provider, MD   metoprolol succinate (TOPROL-XL) 100 mg 24 hr tablet Take 1 tablet (100 mg total) by mouth 2 (two) times a day 5/9/25 7/8/25 Yes Tiffany Carter,    naltrexone (REVIA) 50 mg tablet Take 50 mg by mouth in the morning.   Yes Historical Provider, MD   naltrexone (REVIA) 50 mg tablet Take 1 tablet (50 mg total) by mouth daily 5/14/25 6/13/25 Yes Nicky Messer,    nicotine (NICODERM CQ) 21 mg/24 hr TD 24 hr patch Place 1 patch on the skin over 24 hours daily 4/16/25  Yes Rich Finn MD   nicotine (NICODERM CQ) 21 mg/24 hr TD 24 hr patch Place 1 patch on the skin every 24 hours   Yes Historical Provider, MD   pantoprazole (PROTONIX) 40 mg tablet Take 40 mg by mouth in the morning.   Yes Historical Provider, MD   pantoprazole (PROTONIX) 40 mg tablet Take 40 mg by mouth in the morning.   Yes Historical Provider, MD   pyridoxine (B-6) 100 MG tablet Take 100 mg by mouth in the morning.   Yes  Historical Provider, MD   pyridoxine (VITAMIN B6) 100 mg tablet Take 100 mg by mouth in the morning.   Yes Historical Provider, MD   ranolazine (RANEXA) 500 mg 12 hr tablet Take 500 mg by mouth in the morning and 500 mg in the evening.   Yes Historical Provider, MD   ranolazine (RANEXA) 500 mg 12 hr tablet Take 500 mg by mouth in the morning and 500 mg in the evening.   Yes Historical Provider, MD   saccharomyces boulardii (FLORASTOR) 250 mg capsule Take 250 mg by mouth in the morning and 250 mg in the evening.   Yes Historical Provider, MD   saccharomyces boulardii (FLORASTOR) 250 mg capsule Take 250 mg by mouth in the morning and 250 mg in the evening.   Yes Historical Provider, MD   senna-docusate sodium (SENOKOT-S) 8.6-50 mg per tablet Take 1 tablet by mouth in the morning.   Yes Historical Provider, MD   tamsulosin (FLOMAX) 0.4 mg Take 0.4 mg by mouth daily with dinner   Yes Historical Provider, MD   tamsulosin (FLOMAX) 0.4 mg Take 0.4 mg by mouth daily with dinner   Yes Historical Provider, MD   Thiamine HCl (vitamin B-1) 100 MG TABS Take 1 tablet (100 mg total) by mouth daily 4/16/25  Yes Rich Finn MD   Thiamine Mononitrate (VITAMIN B1) 100 mg tablet Take 100 mg by mouth in the morning.   Yes Historical Provider, MD   vancomycin (VANCOCIN) 125 MG capsule Take 1 capsule (125 mg total) by mouth every 6 (six) hours for 13 days, THEN 1 capsule (125 mg total) every 12 (twelve) hours for 7 days, THEN 1 capsule (125 mg total) daily for 7 days, THEN 1 capsule (125 mg total) every other day for 22 days. 5/9/25 6/27/25 Yes Tiffany Carter DO   Blood Glucose Monitoring Suppl (ONE TOUCH ULTRA MINI) w/Device KIT Use as directed 5/16/19   Historical Provider, MD   Blood Pressure Monitoring (Adult Blood Pressure Cuff Lg) KIT Use in the morning 12/14/23   MD KRYSTEN TinocoUCH DELICA LANCETS FINE MISC in the morning and in the evening and before bedtime. Test. 5/16/19   Historical Provider, MD     No Known  Allergies    Objective :  Temp:  [96.4 °F (35.8 °C)-98.1 °F (36.7 °C)] 98 °F (36.7 °C)  HR:  [] 97  BP: ()/(51-86) 158/86  Resp:  [14-20] 18  SpO2:  [84 %-100 %] 94 %  O2 Device: None (Room air)  Nasal Cannula O2 Flow Rate (L/min):  [2 L/min-4 L/min] 3 L/min    Physical Exam  Vitals and nursing note reviewed.   Constitutional:       General: He is not in acute distress.     Appearance: Normal appearance.      Comments: Evidence of resolving alcohol intoxication.   HENT:      Head: Normocephalic and atraumatic.      Right Ear: External ear normal.      Left Ear: External ear normal.      Nose: Nose normal.      Mouth/Throat:      Mouth: Mucous membranes are moist.      Pharynx: Oropharynx is clear.     Eyes:      Extraocular Movements: Extraocular movements intact.       Cardiovascular:      Rate and Rhythm: Normal rate and regular rhythm.      Pulses: Normal pulses.      Heart sounds: Normal heart sounds.   Pulmonary:      Effort: Pulmonary effort is normal.      Breath sounds: Normal breath sounds.   Abdominal:      General: Abdomen is flat. There is no distension.      Palpations: Abdomen is soft.      Tenderness: There is no abdominal tenderness. There is no right CVA tenderness, left CVA tenderness or guarding.     Musculoskeletal:         General: No tenderness. Normal range of motion.      Cervical back: Normal range of motion.      Right lower leg: No edema.      Left lower leg: No edema.     Skin:     General: Skin is warm and dry.      Capillary Refill: Capillary refill takes less than 2 seconds.      Findings: Bruising (mulitple bruises on extremities) present.     Neurological:      Mental Status: He is alert and oriented to person, place, and time.      Comments: Tremors are present in upper extremities.   Psychiatric:         Mood and Affect: Mood normal.         Behavior: Behavior normal.           Lab Results: I have reviewed the following results:  Results from last 7 days   Lab Units  05/24/25  1614 05/21/25  0159   WBC Thousand/uL 6.49 4.21*   HEMOGLOBIN g/dL 10.7* 10.9*   HEMATOCRIT % 34.4* 34.0*   PLATELETS Thousands/uL 135* 187   BANDS PCT %  --  1   SEGS PCT % 72  --    LYMPHO PCT % 16 41   MONO PCT % 8 8   EOS PCT % 1 3     Results from last 7 days   Lab Units 05/24/25  1614   SODIUM mmol/L 142   POTASSIUM mmol/L 4.9   CHLORIDE mmol/L 100   CO2 mmol/L 24   BUN mg/dL 19   CREATININE mg/dL 1.39*   ANION GAP mmol/L 18*   CALCIUM mg/dL 7.3*   ALBUMIN g/dL 3.2*   TOTAL BILIRUBIN mg/dL 0.74   ALK PHOS U/L 155*   ALT U/L 47   AST U/L 99*   GLUCOSE RANDOM mg/dL 65         Results from last 7 days   Lab Units 05/24/25  2221 05/24/25  1603   POC GLUCOSE mg/dl 112 68     Lab Results   Component Value Date    HGBA1C 5.2 05/05/2025    HGBA1C 5.4 02/15/2025    HGBA1C 5.2 11/14/2024     Results from last 7 days   Lab Units 05/24/25  1614   PROCALCITONIN ng/ml 0.09       Imaging Results Review: I personally reviewed the following image studies in PACS and associated radiology reports: chest xray. My interpretation of the radiology images/reports is: mild pulmonary congestion.  Other Study Results Review: EKG was reviewed.     Administrative Statements   I have spent a total time of >30 minutes in caring for this patient on the day of the visit/encounter including Risks and benefits of tx options, Instructions for management, Patient and family education, Counseling / Coordination of care, Documenting in the medical record, Reviewing/placing orders in the medical record (including tests, medications, and/or procedures), Obtaining or reviewing history  , and Communicating with other healthcare professionals .    ** Please Note: This note has been constructed using a voice recognition system. **         [1]   Past Medical History:  Diagnosis Date    Acute on chronic kidney failure  (HCC)     Alcohol abuse     Alcohol withdrawal (HCC) 06/07/2019    Atrial fibrillation (HCC)     Cancer (HCC)     prostate ca,had  "radiation    Cardiac disease     stents,then triple bypass    COPD (chronic obstructive pulmonary disease) ([facility])     Coronary artery disease     ETOH abuse     Heart failure ([facility])     History of heart surgery     says triple bypass [facility]    Hx of heart artery stent     2014    Hyperlipidemia     Hypertension     Hyponatremia 10/17/2019    Hypovolemic shock ([facility]) 12/22/2019    Lumbar spondylitis ([facility]) 10/13/2022    Metabolic acidosis, increased anion gap 04/21/2021    Nasal bone fracture 10/10/2022    Prostate CA ([facility])     S/P CABG x 3     2004    Sleep apnea    [2]   Past Surgical History:  Procedure Laterality Date    CARDIAC CATHETERIZATION      2 stents    CORONARY ARTERY BYPASS GRAFT      CORONARY ARTERY BYPASS GRAFT  2004    [place] ARTHRD ANT INTERBODY MIN DSC CRV BELOW C2 N/A 12/16/2020    Procedure: Anterior cervical discectomy with fusion C4-C7; Posterior cervical decompression and fusion C2-T2;  Surgeon: David Rowell MD;  Location: [facility];  Service: Neurosurgery    TONSILLECTOMY     [3]   Social History  Tobacco Use    Smoking status: Every Day     Current packs/day: 1.50     Average packs/day: 1.5 packs/day for 40.0 years (60.0 ttl pk-yrs)     Types: Cigarettes    Smokeless tobacco: Never   Vaping Use    Vaping status: Never Used   Substance and Sexual Activity    Alcohol use: Yes     Alcohol/week: 4.0 standard drinks of alcohol     Types: 4 Standard drinks or equivalent per week     Comment: \"quart a day\"    Drug use: No    Sexual activity: Not Currently   [4]   Family History  Problem Relation Name Age of Onset    Diabetes Mother      Uterine cancer Mother      COPD Father      Hypertension Father       "

## 2025-05-25 NOTE — ASSESSMENT & PLAN NOTE
On admission EKG QT C interval 483  Admission mag 1.3    Plan:  Monitoring on telemetry  Replete electrolytes as indicated   Avoid QT prolonging medications

## 2025-05-25 NOTE — PLAN OF CARE
Problem: Potential for Falls  Goal: Patient will remain free of falls  Description: INTERVENTIONS:  - Educate patient/family on patient safety including physical limitations  - Instruct patient to call for assistance with activity   - Consider consulting OT/PT to assist with strengthening/mobility based on AM PAC & JH-HLM score  - Consult OT/PT to assist with strengthening/mobility   - Keep Call bell within reach  - Keep bed low and locked with side rails adjusted as appropriate  - Keep care items and personal belongings within reach  - Initiate and maintain comfort rounds  - Make Fall Risk Sign visible to staff  - Offer Toileting every 2 Hours, in advance of need  - Initiate/Maintain bed alarm  - Obtain necessary fall risk management equipment: yellow socks  - Apply yellow socks and bracelet for high fall risk patients  - Consider moving patient to room near nurses station  Outcome: Progressing     Problem: PAIN - ADULT  Goal: Verbalizes/displays adequate comfort level or baseline comfort level  Description: Interventions:  - Encourage patient to monitor pain and request assistance  - Assess pain using appropriate pain scale  - Administer analgesics as ordered based on type and severity of pain and evaluate response  - Implement non-pharmacological measures as appropriate and evaluate response  - Consider cultural and social influences on pain and pain management  - Notify physician/advanced practitioner if interventions unsuccessful or patient reports new pain  - Educate patient/family on pain management process including their role and importance of  reporting pain   - Provide non-pharmacologic/complimentary pain relief interventions  Outcome: Progressing     Problem: INFECTION - ADULT  Goal: Absence or prevention of progression during hospitalization  Description: INTERVENTIONS:  - Assess and monitor for signs and symptoms of infection  - Monitor lab/diagnostic results  - Monitor all insertion sites, i.e.  indwelling lines, tubes, and drains  - Monitor endotracheal if appropriate and nasal secretions for changes in amount and color  - [place] appropriate cooling/warming therapies per order  - Administer medications as ordered  - Instruct and encourage patient and family to use good hand hygiene technique  - Identify and instruct in appropriate isolation precautions for identified infection/condition  Outcome: Progressing  Goal: Absence of fever/infection during neutropenic period  Description: INTERVENTIONS:  - Monitor WBC  - Perform strict hand hygiene  - Limit to healthy visitors only  - No plants, dried, fresh or silk flowers with turpin in patient room  Outcome: Progressing     Problem: SAFETY ADULT  Goal: Patient will remain free of falls  Description: INTERVENTIONS:  - Educate patient/family on patient safety including physical limitations  - Instruct patient to call for assistance with activity   - Consider consulting OT/PT to assist with strengthening/mobility based on AM PAC & -HLM score  - Consult OT/PT to assist with strengthening/mobility   - Keep Call bell within reach  - Keep bed low and locked with side rails adjusted as appropriate  - Keep care items and personal belongings within reach  - Initiate and maintain comfort rounds  - Make Fall Risk Sign visible to staff  - Offer Toileting every 2 Hours, in advance of need  - Initiate/Maintain bed alarm  - Obtain necessary fall risk management equipment: yellow socks  - Apply yellow socks and bracelet for high fall risk patients  - Consider moving patient to room near nurses station  Outcome: Progressing  Goal: Maintain or return to baseline ADL function  Description: INTERVENTIONS:  -  Assess patient's ability to carry out ADLs; assess patient's baseline for ADL function and identify physical deficits which impact ability to perform ADLs (bathing, care of mouth/teeth, toileting, grooming, dressing, etc.)  - Assess/evaluate cause of self-care deficits   -  Assess range of motion  - Assess patient's mobility; develop plan if impaired  - Assess patient's need for assistive devices and provide as appropriate  - Encourage maximum independence but intervene and supervise when necessary  - Involve family in performance of ADLs  - Assess for home care needs following discharge   - Consider OT consult to assist with ADL evaluation and planning for discharge  - Provide patient education as appropriate  - Monitor functional capacity and physical performance, use of AM PAC & JH-HLM   - Monitor gait, balance and fatigue with ambulation    Outcome: Progressing  Goal: Maintains/Returns to pre admission functional level  Description: INTERVENTIONS:  - Perform AM-PAC 6 Click Basic Mobility/ Daily Activity assessment daily.  - Set and communicate daily mobility goal to care team and patient/family/caregiver.   - Collaborate with rehabilitation services on mobility goals if consulted  - Perform Range of Motion 3 times a day.  - Reposition patient every 2 hours.  - Dangle patient 3 times a day  - Stand patient 3 times a day  - Ambulate patient 3 times a day  - Out of bed to chair 3 times a day   - Out of bed for meals 3 times a day  - Out of bed for toileting  - Record patient progress and toleration of activity level   Outcome: Progressing     Problem: DISCHARGE PLANNING  Goal: Discharge to home or other facility with appropriate resources  Description: INTERVENTIONS:  - Identify barriers to discharge w/patient and caregiver  - Arrange for needed discharge resources and transportation as appropriate  - Identify discharge learning needs (meds, wound care, etc.)  - Arrange for interpretive services to assist at discharge as needed  - Refer to Case Management Department for coordinating discharge planning if the patient needs post-hospital services based on physician/advanced practitioner order or complex needs related to functional status, cognitive ability, or social support  system  Outcome: Progressing     Problem: Knowledge Deficit  Goal: Patient/family/caregiver demonstrates understanding of disease process, treatment plan, medications, and discharge instructions  Description: Complete learning assessment and assess knowledge base.  Interventions:  - Provide teaching at level of understanding  - Provide teaching via preferred learning methods  Outcome: Progressing

## 2025-05-25 NOTE — ASSESSMENT & PLAN NOTE
Chronic -history of alcohol abuse with recurrent admissions for alcohol withdrawal  Stating last drink was on a.m. of presentation  Noting some fine tremors on examination especially when trying to complete task like eating  ED course: 1 g Rocephin, 10 mg Valium IV, 2 g mag sulfate, D5 25 g and 1 L normal saline bolus, DuoNeb x 1  Endorsing that he has not been taking his medications including naltrexone or vitamins for at least a week  Additionally noting that he has not been eating regularly for several days  Patient currently reporting that he would like to do in outpatient rehab    Plan:  [facility] protocol  Case management consulted  Pending B12, folate levels  Continue thiamine, folic acid, B6, multivitamin supplementation  Seizure precautions  Aspiration precautions  Fall precautions  Monitor mentation

## 2025-05-25 NOTE — ED PROVIDER NOTES
Final Diagnosis:  1. Fall, initial encounter    2. Abrasion of forehead, initial encounter        Chief Complaint   Patient presents with    Fall     Pt brought in by EMS from home after falling. Pt drank half bottle of vodka per EMS. Pt in c-collar placed by EMS.       HPI  Pt fell at home    Has pain over head, forehead abrasion    Intoxicated, but mildly    In c collar    No chest pain abd pain hip pain extremity pain    Multiple bruises abrasions diff ages    Tdap utd.     EMS additionally reports:     - Previous charting underwent limited review with attention to last ED visits, labs, ekgs, and prior imaging.  Chart review reveals :     Admission on 05/21/2025, Discharged on 05/21/2025   Component Date Value Ref Range Status    Ventricular Rate 05/21/2025 136  BPM Final    Atrial Rate 05/21/2025 441  BPM Final    QRSD Interval 05/21/2025 88  ms Final    QT Interval 05/21/2025 294  ms Final    QTC Interval 05/21/2025 442  ms Final    QRS [place] 05/21/2025 122  degrees Final    T Wave Axis 05/21/2025 -75  degrees Final    WBC 05/21/2025 4.21 (L)  4.31 - 10.16 Thousand/uL Final    RBC 05/21/2025 3.45 (L)  3.88 - 5.62 Million/uL Final    Hemoglobin 05/21/2025 10.9 (L)  12.0 - 17.0 g/dL Final    Hematocrit 05/21/2025 34.0 (L)  36.5 - 49.3 % Final    MCV 05/21/2025 99 (H)  82 - 98 fL Final    MCH 05/21/2025 31.6  26.8 - 34.3 pg Final    MCHC 05/21/2025 32.1  31.4 - 37.4 g/dL Final    RDW 05/21/2025 19.1 (H)  11.6 - 15.1 % Final    MPV 05/21/2025 9.9  8.9 - 12.7 fL Final    Platelets 05/21/2025 187  149 - 390 Thousands/uL Final    Sodium 05/21/2025 145  135 - 147 mmol/L Final    Potassium 05/21/2025 4.0  3.5 - 5.3 mmol/L Final    Slightly Hemolyzed:Results may be affected.    Chloride 05/21/2025 104  96 - 108 mmol/L Final    CO2 05/21/2025 25  21 - 32 mmol/L Final    ANION GAP 05/21/2025 16 (H)  4 - 13 mmol/L Final    BUN 05/21/2025 17  5 - 25 mg/dL Final    Creatinine 05/21/2025 0.95  0.60 - 1.30 mg/dL Final     Standardized to IDMS reference method    Glucose 05/21/2025 128  65 - 140 mg/dL Final    If the patient is fasting, the ADA then defines impaired fasting glucose as > 100 mg/dL and diabetes as > or equal to 123 mg/dL.    Calcium 05/21/2025 8.0 (L)  8.4 - 10.2 mg/dL Final    eGFR 05/21/2025 84  ml/min/1.73sq m Final    Ethanol Lvl 05/21/2025 254 (H)  <10 mg/dL Final    Segmented % 05/21/2025 38 (L)  43 - 75 % Final    Bands % 05/21/2025 1  0 - 8 % Final    Lymphocytes % 05/21/2025 41  14 - 44 % Final    Monocytes % 05/21/2025 8  4 - 12 % Final    Eosinophils % 05/21/2025 3  0 - 6 % Final    Basophils % 05/21/2025 9 (H)  0 - 1 % Final    Absolute Neutrophils 05/21/2025 1.64 (L)  1.85 - 7.62 Thousand/uL Final    Absolute Lymphocytes 05/21/2025 1.73  0.60 - 4.47 Thousand/uL Final    Absolute Monocytes 05/21/2025 0.34  0.00 - 1.22 Thousand/uL Final    Absolute Eosinophils 05/21/2025 0.13  0.00 - 0.40 Thousand/uL Final    Absolute Basophils 05/21/2025 0.38 (H)  0.00 - 0.10 Thousand/uL Final    Total Counted 05/21/2025 100   Final    RBC Morphology 05/21/2025 Present   Final    Platelet Estimate 05/21/2025 Adequate  Adequate Final    Pathology Review 05/21/2025 Yes (A)  No Final    Anisocytosis 05/21/2025 Present   Final    Hypochromia 05/21/2025 Present   Final    Macrocytes 05/21/2025 Present   Final    Microcytes 05/21/2025 Present   Final    Ovalocytes 05/21/2025 Present   Final    Target Cells 05/21/2025 Present   Final    Case Report 05/21/2025    Final                    Value:Clinical Pathology Report                         Case: [id number]                                  Authorizing Provider:  Fito Liang MD      Collected:           05/21/2025 0159              Ordering Location:     [facility] Received:            05/21/2025 0310                                     Emergency Department                                                         Pathologist:           Gregg Bain MD                                                           Specimen:    Arm, Right                                                                                 Path Slide 05/21/2025    Final                    Value:Peripheral blood smear review shows normochromic normocytic anemia with mild anisopoikilocytosis including microcytes, spherocytes, elliptocytes, target cells, and scattered poikilocytes.  Platelets show normal quantity and morphology with occasional small clumps without satellitosis.  White blood cells show normal distribution and morphology with mildly increased basophils and no significant features of dysplasia or circulating blasts.  Overall, the combination of findings is not definitively specific.  Increased basophils may be associated with infection, autoimmune disease, drug effect, toxin exposure, or other reactive conditions.  The possibility of a primary myeloid disorder may enter the differential diagnosis, though is less likely in the absence of other significant findings.  Correlation with clinical impression of the laboratory findings is recommended.  If clinical concern for more significant disease process exists then consideration for further evaluation with                           hematology/oncology consultation may be of assistance as clinically indicated.         - No language barrier.   - History obtained from patient    - Discuss patient's care, with patient permission or by chart review, with      PMH:   has a past medical history of Acute on chronic kidney failure  (HCC), Alcohol abuse, Alcohol withdrawal (HCC) (06/07/2019), Atrial fibrillation (HCC), Cancer (HCC), Cardiac disease, COPD (chronic obstructive pulmonary disease) (HCC), Coronary artery disease, ETOH abuse, Heart failure (HCC), History of heart surgery, heart artery stent, Hyperlipidemia, Hypertension, Hyponatremia (10/17/2019), Hypovolemic shock (HCC) (12/22/2019), Lumbar spondylitis (HCC) (10/13/2022), Metabolic  acidosis, increased anion gap (04/21/2021), Nasal bone fracture (10/10/2022), Prostate CA (HCC), S/P CABG x 3, and Sleep apnea.    PSH:   has a past surgical history that includes Coronary artery bypass graft; Tonsillectomy; Coronary artery bypass graft (2004); pr arthrd ant interbody min dsc crv below c2 (N/A, 12/16/2020); and Cardiac catheterization.     Social History:  Tobacco Use: High Risk (5/24/2025)    Patient History     Smoking Tobacco Use: Every Day     Smokeless Tobacco Use: Never     Passive Exposure: Not on file     Alcohol Use: Alcohol Misuse (10/17/2022)    AUDIT-C     Frequency of Alcohol Consumption: 4 or more times a week     Average Number of Drinks: 10 or more     Frequency of Binge Drinking: Daily or almost daily     No illicit use       ROS:  Pertinent positives/negatives: .     Some ROS may be present in the HPI and would take precedent over these standard questions asked below.   Review of Systems     CONSTITUTIONAL:  No lethargy. No unexpected weight loss. No change in behavior.  EYES:  No pain, redness, or discharge. No loss of vision. No orbital trauma or pain.   ENT:  No tinnitus or decreased hearing. No epistaxis/purulent rhinorrhea. No voice change, airway closing, trismus.   CARDIOVASCULAR:  No chest pain. No skin mottling or pallor. No change in exertional capacity  RESPIRATORY:  No hemoptysis. No paroxysmal nocturnal dyspnea. No stridor. No apnea or bluing.   GASTROINTESTINAL:  No vomiting, diarrhea. No distension. No melena. No hematochezia.   GENITOURINARY:  No nocturia. No hematuria or foul smelling or cloudy urine. No discharge. No sores/adenopathy.   MUSCULOSKELETAL:  No contracture.  No new deformity.   INTEGUMENTARY:  No swelling. No unexpected contusions. No abrasions. No lymphangitis.  NEUROLOGIC:  No meningismus. No new numbness of the extremities. No new focal weakness. No postural instability  PSYCHIATRIC:  No SI HI AVH  HEMATOLOGICAL:  No bleeding. No petechiae. No  bruising.  ALLERGIES:  No urticaria. No sudden abd cramping. No stridor.    PE:     Physical exam highlights:   Physical Exam       Vitals:    05/23/25 2216 05/24/25 0026 05/24/25 0138 05/24/25 0139   BP: 124/68 113/77  113/77   BP Location: Left arm      Pulse: 86 (!) 113 83 83   Resp: 14   17   Temp: (!) 96.2 °F (35.7 °C)   (!) 96.4 °F (35.8 °C)   TempSrc: Tympanic   Oral   SpO2: 96%   100%     Vitals reviewed by me.   Nursing note reviewed  Chaperone present for all sensitive exam.  Const: No acute distress. Alert. Nontoxic. Not diaphoretic.    HEENT: External ears normal. No protrusion drainage swelling. Nose normal. No drainage/traumatic deformity. MM. Mouth with baseline/symmetric movement. No trismus.   Eyes: No squinting. No icterus. No tearing/swelling/drainage. Tracks through the room with normal EOM.   Neck: ROM normal. No rigidity. No meningismus.  Cards: Rate as per vitals Compared to monitor sinus unless documented. Regular Well perfused.  Pulm: Effort and excursion normal. No distress. No audible wheezing/no stridor. Normal resp rate without retraction or change in work of breathing.  Abd: No distension beyond baseline. No fluctuant wave. Patient without peritoneal pain with shifting/bumping the bed.   MSK: ROM normal baseline. No deformity. No contractures from baseline.   Skin: No new rashes visible. Well perfused. No wounds visualized on exposed skin  Neuro: Nonfocal. Baseline. CN grossly intact. Moving all four with coordination.   Psych: Normal behavior and affect.        A:  - Nursing note reviewed.    Ddx and MDM  Considered diagnoses    Pt normally has episodes of afib rvr here  Doesn't take home meds    Administer home meds here    R/o traumatic injury like ICH, skull injury, c spine injury    Cleared    Monitor for clinical sobriety  Achieves    Treat headache            Dispo decision       My conversation with consultant reveals:        Decision rules:                           My read of  the XR/CT scan reveals:     CT spine cervical without contrast   Final Result      No acute cervical spine fracture or traumatic malalignment.            Workstation performed: [id number]         CT head without contrast   Final Result      No intracranial hemorrhage or calvarial fracture.                  Workstation performed: [id number]             Orders Placed This Encounter   Procedures    CT head without contrast    CT spine cervical without contrast     Labs Reviewed - No data to display    *Each of these labs was reviewed. Particular standout labs will be noted in the ED Course above     Final Diagnosis:  1. Fall, initial encounter    2. Abrasion of forehead, initial encounter          P:  - hospital tx includes   Medications   thiamine tablet 100 mg (100 mg Oral Given 5/23/25 2258)   folic acid (FOLVITE) tablet 1 mg (1 mg Oral Given 5/23/25 2258)   multivitamin-minerals (CENTRUM) tablet 1 tablet (1 tablet Oral Given 5/23/25 2258)   metoprolol succinate (TOPROL-XL) 24 hr tablet 100 mg (100 mg Oral Given 5/24/25 0026)   acetaminophen (TYLENOL) tablet 975 mg (975 mg Oral Given 5/24/25 0026)   naproxen (NAPROSYN) tablet 500 mg (500 mg Oral Given 5/24/25 0138)         - disposition  Time reflects when diagnosis was documented in both MDM as applicable and the Disposition within this note       Time User Action Codes Description Comment    5/24/2025 12:54 AM Fito Liang [W19.XXXA] Fall, initial encounter     5/24/2025 12:54 AM Fito Liang [S00.81XA] Abrasion of forehead, initial encounter           ED Disposition       ED Disposition   Discharge    Condition   Stable    Date/Time   Sat May 24, 2025 12:54 AM    Comment   Gregg Partida discharge to home/self care.                   Follow-up Information    None         - patient will call their PCP to let them know they were in the emergency department. We discuss return precautions and patient is agreeable with plan and aformentioned  disposition.       - additional treatment intended, if consistent with primary provider:  - patient to follow with :      Discharge Medication List as of 5/24/2025  1:19 AM        CONTINUE these medications which have NOT CHANGED    Details   !! albuterol (PROVENTIL HFA,VENTOLIN HFA) 90 mcg/act inhaler Inhale 2 puffs every 4 (four) hours as needed for wheezing, Historical Med      !! albuterol (PROVENTIL HFA,VENTOLIN HFA) 90 mcg/act inhaler Inhale 2 puffs every 6 (six) hours as needed for wheezing, Historical Med      !! albuterol (Ventolin HFA) 90 mcg/act inhaler Inhale 2 puffs every 4 (four) hours as needed for wheezing, Starting Wed 4/16/2025, Normal      aluminum-magnesium hydroxide 200-200 MG/5ML suspension Take 5 mL by mouth every 6 (six) hours as needed for heartburn, Historical Med      aluminum-magnesium hydroxide-simethicone (MAALOX) 4015-3880-093 mg/30 mL suspension Take 30 mL by mouth every 4 (four) hours as needed for indigestion or heartburn, Starting Wed 4/16/2025, Normal      !! apixaban (ELIQUIS) 5 mg Take 5 mg by mouth in the morning and 5 mg in the evening., Historical Med      !! apixaban (Eliquis) 5 mg Take 5 mg by mouth 2 (two) times a day, Historical Med      !! aspirin 81 mg chewable tablet Chew 81 mg in the morning., Historical Med      !! aspirin 81 mg chewable tablet Chew 81 mg daily, Historical Med      !! atorvastatin (LIPITOR) 40 mg tablet Take 1 tablet (40 mg total) by mouth daily, Starting Wed 4/16/2025, Normal      !! atorvastatin (LIPITOR) 40 mg tablet Take 40 mg by mouth daily, Historical Med      Blood Glucose Monitoring Suppl (ONE TOUCH ULTRA MINI) w/Device KIT Use as directed, Starting Thu 5/16/2019, Historical Med      Blood Pressure Monitoring (Adult Blood Pressure Cuff Lg) KIT Use in the morning, Starting Thu 12/14/2023, Normal      !! diltiazem (CARDIZEM SR) 120 mg 12 hr capsule Take 120 mg by mouth 2 (two) times a day, Historical Med      !! diltiazem (CARDIZEM SR) 120 mg  12 hr capsule Take 120 mg by mouth 2 (two) times a day, Historical Med      !! ferrous sulfate 325 (65 Fe) mg tablet Take 325 mg by mouth daily with breakfast, Historical Med      !! ferrous sulfate 325 (65 Fe) mg tablet Take 325 mg by mouth daily with breakfast, Historical Med      !! fluticasone-vilanterol (Breo Ellipta) 200-25 mcg/actuation inhaler Inhale 1 puff daily Rinse mouth after use., Historical Med      !! fluticasone-vilanterol 200-25 mcg/actuation inhaler Inhale 1 puff daily Rinse mouth after use., Historical Med      !! folic acid (FOLVITE) 1 mg tablet Take by mouth daily, Historical Med      !! folic acid (FOLVITE) 1 mg tablet Take by mouth daily, Historical Med      !! gabapentin (NEURONTIN) 300 mg capsule Take 300 mg by mouth 3 (three) times a day, Historical Med      !! gabapentin (NEURONTIN) 300 mg capsule Take 300 mg by mouth 3 (three) times a day, Historical Med      lidocaine (LIDODERM) 5 % Apply 1 patch topically daily Remove & Discard patch within 12 hours or as directed by MD, Historical Med      magnesium Oxide (MAG-OX) 400 mg TABS Take 2 tablets (800 mg total) by mouth 2 (two) times a day, Starting Wed 4/16/2025, Normal      !! magnesium oxide-pyridoxine (BEELITH) 362-20 MG TABS Take 1 tablet by mouth daily, Historical Med      !! magnesium oxide-pyridoxine (BEELITH) 362-20 MG TABS Take 1 tablet by mouth daily, Historical Med      !! metFORMIN (GLUCOPHAGE) 500 mg tablet Take 500 mg by mouth daily with breakfast, Historical Med      !! metFORMIN (GLUCOPHAGE) 500 mg tablet Take 500 mg by mouth 2 (two) times a day with meals, Historical Med      !! metoprolol succinate (TOPROL-XL) 100 mg 24 hr tablet Take 150 mg by mouth every 12 (twelve) hours, Historical Med      !! metoprolol succinate (TOPROL-XL) 100 mg 24 hr tablet Take 1 tablet (100 mg total) by mouth 2 (two) times a day, Starting Fri 5/9/2025, Until Tue 7/8/2025, Normal      !! naltrexone (REVIA) 50 mg tablet Take 50 mg by mouth daily,  Historical Med      !! naltrexone (REVIA) 50 mg tablet Take 1 tablet (50 mg total) by mouth daily, Starting Wed 5/14/2025, Until Fri 6/13/2025, Normal      !! nicotine (NICODERM CQ) 21 mg/24 hr TD 24 hr patch Place 1 patch on the skin over 24 hours daily, Starting Wed 4/16/2025, Normal      !! nicotine (NICODERM CQ) 21 mg/24 hr TD 24 hr patch Place 1 patch on the skin every 24 hours, Historical Med      ONETOUCH DELICA LANCETS FINE MISC 3 (three) times a day Test, Starting u 5/16/2019, Historical Med      !! pantoprazole (PROTONIX) 40 mg tablet Take 40 mg by mouth daily, Historical Med      !! pantoprazole (PROTONIX) 40 mg tablet Take 40 mg by mouth daily, Historical Med      !! pyridoxine (B-6) 100 MG tablet Take 100 mg by mouth daily, Historical Med      !! pyridoxine (VITAMIN B6) 100 mg tablet Take 100 mg by mouth daily, Historical Med      !! ranolazine (RANEXA) 500 mg 12 hr tablet Take 500 mg by mouth 2 (two) times a day, Historical Med      !! ranolazine (RANEXA) 500 mg 12 hr tablet Take 500 mg by mouth 2 (two) times a day, Historical Med      !! saccharomyces boulardii (FLORASTOR) 250 mg capsule Take 250 mg by mouth 2 (two) times a day, Historical Med      !! saccharomyces boulardii (FLORASTOR) 250 mg capsule Take 250 mg by mouth 2 (two) times a day, Historical Med      senna-docusate sodium (SENOKOT-S) 8.6-50 mg per tablet Take 1 tablet by mouth daily, Historical Med      !! tamsulosin (FLOMAX) 0.4 mg Take 0.4 mg by mouth daily with dinner, Historical Med      !! tamsulosin (FLOMAX) 0.4 mg Take 0.4 mg by mouth daily with dinner, Historical Med      Thiamine HCl (vitamin B-1) 100 MG TABS Take 1 tablet (100 mg total) by mouth daily, Starting Wed 4/16/2025, Normal      Thiamine Mononitrate (VITAMIN B1) 100 mg tablet Take 100 mg by mouth daily, Historical Med      vancomycin (VANCOCIN) 125 MG capsule Multiple Dosages:Starting Fri 5/9/2025, Until Wed 5/21/2025 at 2359, THEN Starting Thu 5/22/2025, Until Wed  5/28/2025 at 2359, THEN Starting Thu 5/29/2025, Until Wed 6/4/2025 at 2359, THEN Starting Thu 6/5/2025, Until Thu 6/26/2025 at 2359Take 1 cap allison (125 mg total) by mouth every 6 (six) hours for 13 days, THEN 1 capsule (125 mg total) every 12 (twelve) hours for 7 days, THEN 1 capsule (125 mg total) daily for 7 days, THEN 1 capsule (125 mg total) every other day for 22 days., Normal       !! - Potential duplicate medications found. Please discuss with provider.        No discharge procedures on file.  Prior to Admission Medications   Prescriptions Last Dose Informant Patient Reported? Taking?   Blood Glucose Monitoring Suppl (ONE TOUCH ULTRA MINI) w/Device KIT  Self Yes No   Sig: Use as directed   Blood Pressure Monitoring (Adult Blood Pressure Cuff Lg) KIT   No No   Sig: Use in the morning   ONETOUCH DELICA LANCETS FINE MISC  Self Yes No   Sig: in the morning and in the evening and before bedtime. Test.   Thiamine HCl (vitamin B-1) 100 MG TABS   No No   Sig: Take 1 tablet (100 mg total) by mouth daily   Thiamine Mononitrate (VITAMIN B1) 100 mg tablet   Yes No   Sig: Take 100 mg by mouth in the morning.   albuterol (PROVENTIL HFA,VENTOLIN HFA) 90 mcg/act inhaler   Yes No   Sig: Inhale 2 puffs every 4 (four) hours as needed for wheezing   albuterol (PROVENTIL HFA,VENTOLIN HFA) 90 mcg/act inhaler   Yes No   Sig: Inhale 2 puffs every 6 (six) hours as needed for wheezing   albuterol (Ventolin HFA) 90 mcg/act inhaler   No No   Sig: Inhale 2 puffs every 4 (four) hours as needed for wheezing   aluminum-magnesium hydroxide 200-200 MG/5ML suspension   Yes No   Sig: Take 5 mL by mouth every 6 (six) hours as needed for heartburn   aluminum-magnesium hydroxide-simethicone (MAALOX) 4757-6777-368 mg/30 mL suspension   No No   Sig: Take 30 mL by mouth every 4 (four) hours as needed for indigestion or heartburn   apixaban (ELIQUIS) 5 mg   Yes No   Sig: Take 5 mg by mouth in the morning and 5 mg in the evening.   apixaban (Eliquis) 5  mg   Yes No   Sig: Take 5 mg by mouth in the morning and 5 mg in the evening.   aspirin 81 mg chewable tablet   Yes No   Sig: Chew 81 mg in the morning.   aspirin 81 mg chewable tablet   Yes No   Sig: Chew 81 mg in the morning.   atorvastatin (LIPITOR) 40 mg tablet   No No   Sig: Take 1 tablet (40 mg total) by mouth daily   atorvastatin (LIPITOR) 40 mg tablet   Yes No   Sig: Take 40 mg by mouth in the morning.   diltiazem (CARDIZEM SR) 120 mg 12 hr capsule   Yes No   Sig: Take 120 mg by mouth in the morning and 120 mg in the evening.   diltiazem (CARDIZEM SR) 120 mg 12 hr capsule   Yes No   Sig: Take 120 mg by mouth in the morning and 120 mg in the evening.   ferrous sulfate 325 (65 Fe) mg tablet   Yes No   Sig: Take 325 mg by mouth daily with breakfast   ferrous sulfate 325 (65 Fe) mg tablet   Yes No   Sig: Take 325 mg by mouth daily with breakfast   fluticasone-vilanterol (Breo Ellipta) 200-25 mcg/actuation inhaler   Yes No   Sig: Inhale 1 puff in the morning. Rinse mouth after use.   fluticasone-vilanterol 200-25 mcg/actuation inhaler   Yes No   Sig: Inhale 1 puff in the morning. Rinse mouth after use.   folic acid (FOLVITE) 1 mg tablet   Yes No   Sig: Take by mouth in the morning.   folic acid (FOLVITE) 1 mg tablet   Yes No   Sig: Take by mouth in the morning.   gabapentin (NEURONTIN) 300 mg capsule   Yes No   Sig: Take 300 mg by mouth in the morning and 300 mg in the evening and 300 mg before bedtime.   gabapentin (NEURONTIN) 300 mg capsule   Yes No   Sig: Take 300 mg by mouth in the morning and 300 mg in the evening and 300 mg before bedtime.   lidocaine (LIDODERM) 5 %   Yes No   Sig: Apply 1 patch topically in the morning. Remove & Discard patch within 12 hours or as directed by MD.   magnesium Oxide (MAG-OX) 400 mg TABS   No No   Sig: Take 2 tablets (800 mg total) by mouth 2 (two) times a day   magnesium oxide-pyridoxine (BEELITH) 362-20 MG TABS   Yes No   Sig: Take 1 tablet by mouth in the morning.    magnesium oxide-pyridoxine (BEELITH) 362-20 MG TABS   Yes No   Sig: Take 1 tablet by mouth in the morning.   metFORMIN (GLUCOPHAGE) 500 mg tablet   Yes No   Sig: Take 500 mg by mouth daily with breakfast   metFORMIN (GLUCOPHAGE) 500 mg tablet   Yes No   Sig: Take 500 mg by mouth in the morning and 500 mg in the evening. Take with meals.   metoprolol succinate (TOPROL-XL) 100 mg 24 hr tablet   Yes No   Sig: Take 150 mg by mouth every 12 (twelve) hours   metoprolol succinate (TOPROL-XL) 100 mg 24 hr tablet   No No   Sig: Take 1 tablet (100 mg total) by mouth 2 (two) times a day   naltrexone (REVIA) 50 mg tablet   Yes No   Sig: Take 50 mg by mouth in the morning.   naltrexone (REVIA) 50 mg tablet   No No   Sig: Take 1 tablet (50 mg total) by mouth daily   nicotine (NICODERM CQ) 21 mg/24 hr TD 24 hr patch   No No   Sig: Place 1 patch on the skin over 24 hours daily   nicotine (NICODERM CQ) 21 mg/24 hr TD 24 hr patch   Yes No   Sig: Place 1 patch on the skin every 24 hours   pantoprazole (PROTONIX) 40 mg tablet   Yes No   Sig: Take 40 mg by mouth in the morning.   pantoprazole (PROTONIX) 40 mg tablet   Yes No   Sig: Take 40 mg by mouth in the morning.   pyridoxine (B-6) 100 MG tablet   Yes No   Sig: Take 100 mg by mouth in the morning.   pyridoxine (VITAMIN B6) 100 mg tablet   Yes No   Sig: Take 100 mg by mouth in the morning.   ranolazine (RANEXA) 500 mg 12 hr tablet   Yes No   Sig: Take 500 mg by mouth in the morning and 500 mg in the evening.   ranolazine (RANEXA) 500 mg 12 hr tablet   Yes No   Sig: Take 500 mg by mouth in the morning and 500 mg in the evening.   saccharomyces boulardii (FLORASTOR) 250 mg capsule   Yes No   Sig: Take 250 mg by mouth in the morning and 250 mg in the evening.   saccharomyces boulardii (FLORASTOR) 250 mg capsule   Yes No   Sig: Take 250 mg by mouth in the morning and 250 mg in the evening.   senna-docusate sodium (SENOKOT-S) 8.6-50 mg per tablet   Yes No   Sig: Take 1 tablet by mouth  "in the morning.   tamsulosin (FLOMAX) 0.4 mg   Yes No   Sig: Take 0.4 mg by mouth daily with dinner   tamsulosin (FLOMAX) 0.4 mg   Yes No   Sig: Take 0.4 mg by mouth daily with dinner   vancomycin (VANCOCIN) 125 MG capsule   No No   Sig: Take 1 capsule (125 mg total) by mouth every 6 (six) hours for 13 days, THEN 1 capsule (125 mg total) every 12 (twelve) hours for 7 days, THEN 1 capsule (125 mg total) daily for 7 days, THEN 1 capsule (125 mg total) every other day for 22 days.      Facility-Administered Medications: None       Portions of the record may have been created with voice recognition software. Occasional wrong word or \"sound a like\" substitutions may have occurred due to the inherent limitations of voice recognition software. Read the chart carefully and recognize, using context, where substitutions have occurred.    Electronically signed by:  MD Fito Franco MD  05/24/25 4353    "

## 2025-05-25 NOTE — UTILIZATION REVIEW
OBSERVATION 5-24-25 CHANGED TO INPATIENT 5-25-25 FOR ALCOHOL WITHDRAWAL , IV FLUIDS.      Initial Clinical Review    Admission: Date/Time/Statement:     Admission Orders (From admission, onward)       Ordered        05/25/25 1646  INPATIENT ADMISSION  Once            05/24/25 2049  Place in Observation  Once                             ED Arrival Information       Expected   -    Arrival   5/24/2025 15:42    Acuity   Emergent              Means of arrival   Ambulance    Escorted by   [facility]   Hospitalist    Admission type   Emergency              Arrival complaint   etoh             Chief Complaint   Patient presents with    Alcohol Intoxication       Initial Presentation: 65 y.o. male presents to ed from home via ems on 5-24 for evaluation and treatment of alcohol intoxication. Seen in ed 24 hurs ago.  Last drink earlier today. Baseline 1qt of vodka daily. Not taking his medications for several days.   PMHX:  alcohol withdrawal, AFIB, CAD, COPD, CABG,HTN, prostate cancer.  Clinical assessment significant for temp 9 6.4, hr rt 113, spo2 84% ra, bp 86/51, pain 7/10. CIWA =7 ( tremor, headache, anxiety, nausea/vomiting). CR 1.39 ( baseline 0.8-1) , AST 99, ALK PHOS 155, MAG 1.3. Last CXR with pneumonitis versus pneumonia in right lobe.  Initially treated with iv .9% ns 1L bolus, duo neb x1, iv Mag x1, iv D50% x1, iv valium x1, iv ceftriaxone x1.  Admit to observation for alcohol withdrawal, SIRS, pneumonitis, on vanco for C diff, ENMA.  Plan includes: CIWA assessments, thiamine, folic acid, B6, multi vitamin, seizure precautions, fall precautions, po vancomycin, iv LR 50/hr, 2-4L O2nc, check PVR, I/O .    Anticipated Length of Stay/Certification Statement: Patient will be admitted on an observation basis with an anticipated length of stay of less than 2 midnights secondary to alcohol detox.     Date: 5-25-25    Day 2: observation changed to inpatient  Certification Statement: The patient will continue  to require additional inpatient hospital stay due to alcohol intoxication   Discharge Plan: Anticipate discharge in 24-48 hrs to home.    CIWA=7 ( headache and tremor).  Remains on iv LR 50/hr, po thiamine, po vancomycin, po B6, po multi vitamin.      ED Treatment-Medication Administration from 05/24/2025 1542 to 05/24/2025 2134         Date/Time Order Dose Route Action     05/24/2025 1620 sodium chloride 0.9 % bolus 1,000 mL 1,000 mL Intravenous New Bag     05/24/2025 1643 ipratropium-albuterol (DUO-NEB) 0.5-2.5 mg/3 mL inhalation solution 3 mL 3 mL Nebulization Given     05/24/2025 1643 dextrose 50 % IV solution 25 g 25 g Intravenous Given     05/24/2025 1651 magnesium sulfate 2 g/50 mL IVPB (premix) 2 g 2 g Intravenous New Bag     05/24/2025 2027 diazepam (VALIUM) injection 10 mg 10 mg Intravenous Given     05/24/2025 2112 cefTRIAXone (ROCEPHIN) IVPB (premix in dextrose) 1,000 mg 50 mL 1,000 mg Intravenous New Bag       Scheduled Medications:    apixaban, 5 mg, Oral, BID  aspirin, 81 mg, Oral, Daily  atorvastatin, 40 mg, Oral, Daily  calcium carbonate, 1,000 mg, Oral, Daily  diltiazem, 120 mg, Oral, BID  ferrous sulfate, 325 mg, Oral, Daily With Breakfast  fluticasone-vilanterol, 1 puff, Inhalation, Daily  folic acid, 1 mg, Oral, Daily  gabapentin, 300 mg, Oral, TID  insulin lispro, 1-5 Units, Subcutaneous, TID With Meals  insulin lispro, 1-5 Units, Subcutaneous, HS  magnesium Oxide, 800 mg, Oral, BID  metoprolol succinate, 150 mg, Oral, Q12H SEDA  multivitamin stress formula, 1 tablet, Oral, Daily  nicotine, 1 patch, Transdermal, Daily  pantoprazole, 40 mg, Oral, Daily  pyridoxine, 100 mg, Oral, Daily  ranolazine, 500 mg, Oral, BID  saccharomyces boulardii, 250 mg, Oral, BID  tamsulosin, 0.4 mg, Oral, Daily With Dinner  vitamin B-1, 100 mg, Oral, Daily  vancomycin oral (capsules or solution), 125 mg, Oral, Q6H SEDA      Continuous IV Infusions:  lactated ringers, 50 mL/hr, Intravenous, Continuous      PRN  Meds:  acetaminophen, 650 mg, Oral, Q6H PRN  albuterol, 2 puff, Inhalation, Q4H PRN  aluminum-magnesium hydroxide-simethicone, 30 mL, Oral, Q4H PRN  levalbuterol, 0.63 mg, Nebulization, Q8H PRN      ED Triage Vitals   Temperature Pulse Respirations Blood Pressure SpO2 Pain Score   05/24/25 1549 05/24/25 1549 05/24/25 1549 05/24/25 1549 05/24/25 1549 05/24/25 2200   (!) 96.9 °F   (36.1 °C) 85 14 (!) 86/51 (!) 88 % No Pain     Weight (last 2 days)       Date/Time Weight    05/25/25 0600 74.5 (164.3)    05/24/25 21:54:05 74.5 (164.3)            Vital Signs (last 3 days)       Date/Time Temp Pulse Resp BP MAP  SpO2 Nasal Cannula O2 Flow Rate (L/min) CIWA-Ar Total Pain    05/25/25 15:17:21 99 °F   (37.2 °C) -- 17 151/87 108 -- -- 7 --    05/25/25 14:26:12 98.9 °F (37.2 °C) -- 19 148/84 105 -- -- -- --    05/25/25 1346 97.9 °F (36.6 °C) 93 18 142/78 -- 92 % -- -- --    05/25/25 12:55:48 -- 101 -- 118/86 97 -- -- 7 --    05/25/25 1246 99 °F (37.2 °C) 99 19 -- -- 93 % -- -- --    05/25/25 0800 -- -- -- -- -- -- -- 7 --    05/25/25 07:58:04 99.2 °F (37.3 °C) 102 19 146/74 98 96 % 2 L/min -- 4    05/25/25 06:11:49 98.9 °F (37.2 °C) 97 18 144/78 100 97 % 2 L/min -- --    05/25/25 0408 -- 86 -- 118/66 -- -- -- 6 --    05/25/25 0400 -- 79 -- -- -- 98 % 3 L/min -- --    05/25/25 0328 -- 78 -- -- -- 88 % -- -- --    05/25/25 0315 -- -- -- -- -- 90 % -- -- --    05/25/25 0123 -- -- -- -- -- 98 % -- -- --    05/25/25 0054 -- 97 -- -- -- 94 % -- -- --    05/25/25 00:08:58 98 °F   (36.7 °C) 87 -- 158/86 110 98 % 3 L/min -- --    05/25/25 00:07:58 -- 107 -- 158/86 110 98 % -- -- --    05/25/25 00:07:17 -- 103 18 158/86 110 98 % -- 7 --    05/24/25 2200 -- -- -- -- -- -- -- -- No Pain    05/24/25 21:54:05 98.1 °F (36.7 °C) 93 18 116/72 87 97 % 3 L/min -- --    05/24/25 2100 -- 87 18 122/73 92 99 % -- -- --    05/24/25 2045 -- 85 18 -- -- 99 % 4 L/min -- --    05/24/25 2030 -- 99 18 144/79 104 94 % -- -- --    05/24/25 2017 -- 82 --  137/84 -- -- -- 7 --    05/24/25 2000 -- 82 16 137/84 104 99 % 2 L/min -- --    05/24/25 1900 -- 90 20 106/57 75 93 % 2 L/min -- --    05/24/25 1830 -- 72 17 112/56 76 98 % -- -- --    05/24/25 1800 -- 71 20 100/59 77 98 % -- -- --    05/24/25 1730 -- 73 17 117/62 82 98 % -- -- --    05/24/25 1700 -- 85 17 126/83 100 98 % -- -- --    05/24/25 1630 -- 71 16 102/64 77 97 % 3 L/min -- --    05/24/25 1627 -- -- -- -- -- 96 % -- -- --    05/24/25 1615 -- 74 16 102/59 79 84 % -- -- --    05/24/25 1549 96.9 °F (36.1 °C) 85 14 86/51 -- 88 % -- -- --        CIWA-Ar Score       Row Name 05/25/25 15:17:21 05/25/25 12:55:48 05/25/25 0800       CIWA-Ar    Pulse -- 101 --    Nausea and Vomiting 0 0 0    Tactile Disturbances 0 0 0    Tremor 6 6 7    Auditory Disturbances 0 0 0    Paroxysmal Sweats 0 0 0    Visual Disturbances 0 0 0    Anxiety 0 0 0    Headache, Fullness in Head 1 0 0    Agitation 0 1 0    Orientation and Clouding of Sensorium 0 0 0    CIWA-Ar Total 7 7 7      Row Name 05/25/25 0408 05/25/25 00:07:17 05/24/25 2017       CIWA-Ar    BP -- -- 137/84    Pulse -- -- 82    Nausea and Vomiting 0 0 1    Tactile Disturbances 0 0 0    Tremor 4 5 4    Auditory Disturbances 0 0 0    Paroxysmal Sweats 0 0 0    Visual Disturbances 0 0 0    Anxiety 1 1 1    Headache, Fullness in Head 1 1 1    Agitation 0 0 0    Orientation and Clouding of Sensorium 0 0 0    CIWA-Ar Total 6 7 7                 Pertinent Labs/Diagnostic Test Results:     Radiology:    XR chest 1 view portable   Final (05/25 1113)      No acute cardiopulmonary disease.          Results from last 7 days   Lab Units 05/25/25  0607 05/24/25  1614 05/21/25  0159   WBC Thousand/uL 7.96 6.49 4.21*   HEMOGLOBIN g/dL 9.4* 10.7* 10.9*   HEMATOCRIT % 29.2* 34.4* 34.0*   PLATELETS Thousands/uL 97* 135* 187   TOTAL NEUT ABS Thousands/µL 6.23 4.73  --    BANDS PCT %  --   --  1         Results from last 7 days   Lab Units 05/25/25  0607 05/25/25  0100 05/24/25  1614  05/21/25  0159   SODIUM mmol/L 141 143 142 145   POTASSIUM mmol/L 3.8 3.9 4.9 4.0   CHLORIDE mmol/L 101 103 100 104   CO2 mmol/L 24 23 24 25   ANION GAP mmol/L 16* 17* 18* 16*   BUN mg/dL 17 20 19 17   CREATININE mg/dL 0.90 1.13 1.39* 0.95   EGFR ml/min/1.73sq m 90 68 53 84   CALCIUM mg/dL 7.5* 7.3* 7.3* 8.0*   MAGNESIUM mg/dL 2.2  --  1.3*  --      Results from last 7 days   Lab Units 05/25/25  0607 05/24/25  2229 05/24/25  1614   AST U/L 63*  --  99*   ALT U/L 39  --  47   ALK PHOS U/L 145*  --  155*   TOTAL PROTEIN g/dL 6.4  --  6.7   ALBUMIN g/dL 3.2*  --  3.2*   TOTAL BILIRUBIN mg/dL 0.68  --  0.74   AMMONIA umol/L  --  33  --      Results from last 7 days   Lab Units 05/25/25  1535 05/25/25  1113 05/25/25  0720 05/24/25  2221 05/24/25  1603   POC GLUCOSE mg/dl 142* 137 88 112 68     Results from last 7 days   Lab Units 05/25/25  0607 05/25/25  0100 05/24/25  1614 05/21/25  0159   GLUCOSE RANDOM mg/dL 82 106 65 128             BETA-HYDROXYBUTYRATE   Date Value Ref Range Status   01/22/2024 3.2 (H) <0.6 mmol/L Final     Results from last 7 days   Lab Units 05/25/25  0607 05/24/25  1614   PROCALCITONIN ng/ml 0.09 0.09     Results from last 7 days   Lab Units 05/24/25  1614   LIPASE u/L 44       Results from last 7 days   Lab Units 05/24/25  2325   CLARITY UA  Clear   COLOR UA  Light Orange   SPEC GRAV UA  >=1.030   PH UA  6.0   GLUCOSE UA mg/dl Negative   KETONES UA mg/dl 10 (1+)*   BLOOD UA  Small*   PROTEIN UA mg/dl 100 (2+)*   NITRITE UA  Negative   BILIRUBIN UA  Small*   UROBILINOGEN UA (BE) mg/dl 4.0*   LEUKOCYTES UA  Negative   WBC UA /hpf 2-4   RBC UA /hpf 1-2   BACTERIA UA /hpf Occasional   EPITHELIAL CELLS WET PREP /hpf Occasional     Results from last 7 days   Lab Units 05/25/25  1044 05/21/25  0159   ETHANOL LVL mg/dL <10 254*       Past Medical History[1]  Diagnosis Date    Acute on chronic kidney failure  (HCC)      Alcohol abuse      Alcohol withdrawal (HCC) 06/07/2019    Atrial fibrillation (HCC)       Cancer (HCC)       prostate ca,had radiation    Cardiac disease       stents,then triple bypass    COPD (chronic obstructive pulmonary disease) (HCC)      Coronary artery disease      ETOH abuse      Heart failure (HCC)      History of heart surgery       says triple bypass [facility]    Hx of heart artery stent       2014    Hyperlipidemia      Hypertension      Hyponatremia 10/17/2019    Hypovolemic shock (HCC) 12/22/2019    Lumbar spondylitis (HCC) 10/13/2022    Metabolic acidosis, increased anion gap 04/21/2021    Nasal bone fracture 10/10/2022    Prostate CA (HCC)      S/P CABG x 3       2004    Sleep apnea      Present on Admission:   COPD, severity to be determined (HCC)   Type 2 diabetes mellitus with chronic kidney disease (HCC)   Dyslipidemia   Alcohol withdrawal (HCC)   CAD (coronary artery disease)   Atrial fibrillation (HCC)   GERD (gastroesophageal reflux disease)   SIRS (systemic inflammatory response syndrome) (HCC)   Acute respiratory failure with hypoxia (HCC)   Nicotine abuse   Chronic heart failure with preserved ejection fraction (HCC)   QT prolongation   Increased anion gap   Acute kidney injury superimposed on chronic kidney disease  (HCC)   Ambulatory dysfunction   Hypertension   Recurrent Clostridioides difficile diarrhea   Electrolyte abnormality   Abnormal LFTs      Admitting Diagnosis:     Alcohol intoxication (HCC) [F10.929]  Hypoxia [R09.02]    Age/Sex: 65 y.o. male    Network Utilization Review Department  ATTENTION: Please call with any questions or concerns to [phone] and carefully listen to the prompts so that you are directed to the right person. All voicemails are confidential.   For Discharge needs, contact Care Management DC Support Team at [phone] opt. [phone]  Send all requests for admission clinical reviews, approved or denied determinations and any other requests to dedicated fax number below belonging to the campus where the patient is receiving treatment. List  of dedicated fax numbers for the Facilities:  FACILITY NAME UR FAX NUMBER   ADMISSION DENIALS (Administrative/Medical Necessity) [phone]   DISCHARGE SUPPORT TEAM (NETWORK) [phone]   PARENT CHILD HEALTH (Maternity/NICU/Pediatrics) [phone]   [facility] [phone]   [facility] [phone]   [facility] [phone]   [facility] [phone]   [facility] [phone]   [facility] [phone]   [facility] [phone]   [facility] [phone]   [facility] [phone]   [facility] [phone]   [facility] [phone]   [facility] [phone]               [1]   Past Medical History:  Diagnosis Date    Acute on chronic kidney failure  (HCC)     Alcohol abuse     Alcohol withdrawal (HCC) 06/07/2019    Atrial fibrillation (HCC)     Cancer (HCC)     prostate ca,had radiation    Cardiac disease     stents,then triple bypass    COPD (chronic obstructive pulmonary disease) (HCC)     Coronary artery disease     ETOH abuse     Heart failure (HCC)     History of heart surgery     [facility] triple bypass [facility]    Hx of heart artery stent     2014    Hyperlipidemia     Hypertension     Hyponatremia 10/17/2019    Hypovolemic shock (HCC) 12/22/2019    Lumbar spondylitis (HCC) 10/13/2022    Metabolic acidosis, increased anion gap 04/21/2021    Nasal bone fracture 10/10/2022    Prostate CA (HCC)     S/P CABG x 3     2004    Sleep apnea

## 2025-05-25 NOTE — PLAN OF CARE
Problem: Potential for Falls  Goal: Patient will remain free of falls  Description: INTERVENTIONS:  - Educate patient/family on patient safety including physical limitations  - Instruct patient to call for assistance with activity   - Consider consulting OT/PT to assist with strengthening/mobility based on AM PAC & JH-HLM score  - Consult OT/PT to assist with strengthening/mobility   - Keep Call bell within reach  - Keep bed low and locked with side rails adjusted as appropriate  - Keep care items and personal belongings within reach  - Initiate and maintain comfort rounds  - Make Fall Risk Sign visible to staff  - Offer Toileting every 2 Hours, in advance of need  - Initiate/Maintain bed alarm  - Obtain necessary fall risk management equipment  - Apply yellow socks and bracelet for high fall risk patients  - Consider moving patient to room near nurses station  Outcome: Progressing     Problem: PAIN - ADULT  Goal: Verbalizes/displays adequate comfort level or baseline comfort level  Description: Interventions:  - Encourage patient to monitor pain and request assistance  - Assess pain using appropriate pain scale  - Administer analgesics as ordered based on type and severity of pain and evaluate response  - Implement non-pharmacological measures as appropriate and evaluate response  - Consider cultural and social influences on pain and pain management  - Notify physician/advanced practitioner if interventions unsuccessful or patient reports new pain  - Educate patient/family on pain management process including their role and importance of  reporting pain   - Provide non-pharmacologic/complimentary pain relief interventions  Outcome: Progressing     Problem: INFECTION - ADULT  Goal: Absence or prevention of progression during hospitalization  Description: INTERVENTIONS:  - Assess and monitor for signs and symptoms of infection  - Monitor lab/diagnostic results  - Monitor all insertion sites, i.e. indwelling lines,  tubes, and drains  - Monitor endotracheal if appropriate and nasal secretions for changes in amount and color  - [place] appropriate cooling/warming therapies per order  - Administer medications as ordered  - Instruct and encourage patient and family to use good hand hygiene technique  - Identify and instruct in appropriate isolation precautions for identified infection/condition  Outcome: Progressing  Goal: Absence of fever/infection during neutropenic period  Description: INTERVENTIONS:  - Monitor WBC  - Perform strict hand hygiene  - Limit to healthy visitors only  - No plants, dried, fresh or silk flowers with trupin in patient room  Outcome: Progressing     Problem: SAFETY ADULT  Goal: Patient will remain free of falls  Description: INTERVENTIONS:  - Educate patient/family on patient safety including physical limitations  - Instruct patient to call for assistance with activity   - Consider consulting OT/PT to assist with strengthening/mobility based on AM PAC & -HLM score  - Consult OT/PT to assist with strengthening/mobility   - Keep Call bell within reach  - Keep bed low and locked with side rails adjusted as appropriate  - Keep care items and personal belongings within reach  - Initiate and maintain comfort rounds  - Make Fall Risk Sign visible to staff  - Apply yellow socks and bracelet for high fall risk patients  - Consider moving patient to room near nurses station  Outcome: Progressing  Goal: Maintain or return to baseline ADL function  Description: INTERVENTIONS:  -  Assess patient's ability to carry out ADLs; assess patient's baseline for ADL function and identify physical deficits which impact ability to perform ADLs (bathing, care of mouth/teeth, toileting, grooming, dressing, etc.)  - Assess/evaluate cause of self-care deficits   - Assess range of motion  - Assess patient's mobility; develop plan if impaired  - Assess patient's need for assistive devices and provide as appropriate  - Encourage  maximum independence but intervene and supervise when necessary  - Involve family in performance of ADLs  - Assess for home care needs following discharge   - Consider OT consult to assist with ADL evaluation and planning for discharge  - Provide patient education as appropriate  - Monitor functional capacity and physical performance, use of AM PAC & JH-HLM   - Monitor gait, balance and fatigue with ambulation    Outcome: Progressing  Goal: Maintains/Returns to pre admission functional level  Description: INTERVENTIONS:  - Perform AM-PAC 6 Click Basic Mobility/ Daily Activity assessment daily.  - Set and communicate daily mobility goal to care team and patient/family/caregiver.   - Collaborate with rehabilitation services on mobility goals if consulted  - Out of bed for toileting  - Record patient progress and toleration of activity level   Outcome: Progressing     Problem: DISCHARGE PLANNING  Goal: Discharge to home or other facility with appropriate resources  Description: INTERVENTIONS:  - Identify barriers to discharge w/patient and caregiver  - Arrange for needed discharge resources and transportation as appropriate  - Identify discharge learning needs (meds, wound care, etc.)  - Arrange for interpretive services to assist at discharge as needed  - Refer to Case Management Department for coordinating discharge planning if the patient needs post-hospital services based on physician/advanced practitioner order or complex needs related to functional status, cognitive ability, or social support system  Outcome: Progressing     Problem: Knowledge Deficit  Goal: Patient/family/caregiver demonstrates understanding of disease process, treatment plan, medications, and discharge instructions  Description: Complete learning assessment and assess knowledge base.  Interventions:  - Provide teaching at level of understanding  - Provide teaching via preferred learning methods  Outcome: Progressing

## 2025-05-25 NOTE — ASSESSMENT & PLAN NOTE
As evidenced by tachycardia, tachypnea -query source of infection  WBC WNL, Pro-Vlad WNL x 1  UA: Light orange, high specific gravity, protein, ketones, 0 below tension, bilirubin, occult blood  History of recurrent admissions for similar concerns  Last CXR with pneumonitis versus pneumonia in right lobe  ED course: Azithromycin x 1, Rocephin x 1  Previously given vancomycin p.o. for C. Difficile, patient endorses that he had not been taking  Denies current diarrhea  IVF with D5  CXR: No acute cardiopulmonary disease.   Proca.09 < 0.09    Plan:    Hold off Abx  If fever, or constitutional sxs., consider restarting

## 2025-05-26 LAB
ANION GAP SERPL CALCULATED.3IONS-SCNC: 10 MMOL/L (ref 4–13)
BASOPHILS # BLD AUTO: 0.03 THOUSANDS/ÂΜL (ref 0–0.1)
BASOPHILS NFR BLD AUTO: 1 % (ref 0–1)
BUN SERPL-MCNC: 16 MG/DL (ref 5–25)
C DIFF TOX A+B STL QL IA: POSITIVE
C DIFF TOX GENS STL QL NAA+PROBE: POSITIVE
CA-I BLD-SCNC: 1.03 MMOL/L (ref 1.12–1.32)
CALCIUM SERPL-MCNC: 8.3 MG/DL (ref 8.4–10.2)
CHLORIDE SERPL-SCNC: 96 MMOL/L (ref 96–108)
CO2 SERPL-SCNC: 25 MMOL/L (ref 21–32)
CREAT SERPL-MCNC: 0.95 MG/DL (ref 0.6–1.3)
EOSINOPHIL # BLD AUTO: 0.07 THOUSAND/ÂΜL (ref 0–0.61)
EOSINOPHIL NFR BLD AUTO: 1 % (ref 0–6)
ERYTHROCYTE [DISTWIDTH] IN BLOOD BY AUTOMATED COUNT: 17.5 % (ref 11.6–15.1)
GFR SERPL CREATININE-BSD FRML MDRD: 84 ML/MIN/1.73SQ M
GLUCOSE SERPL-MCNC: 128 MG/DL (ref 65–140)
GLUCOSE SERPL-MCNC: 135 MG/DL (ref 65–140)
GLUCOSE SERPL-MCNC: 148 MG/DL (ref 65–140)
GLUCOSE SERPL-MCNC: 81 MG/DL (ref 65–140)
GLUCOSE SERPL-MCNC: 92 MG/DL (ref 65–140)
HCT VFR BLD AUTO: 29.9 % (ref 36.5–49.3)
HGB BLD-MCNC: 9.6 G/DL (ref 12–17)
IMM GRANULOCYTES # BLD AUTO: 0.01 THOUSAND/UL (ref 0–0.2)
IMM GRANULOCYTES NFR BLD AUTO: 0 % (ref 0–2)
LYMPHOCYTES # BLD AUTO: 0.82 THOUSANDS/ÂΜL (ref 0.6–4.47)
LYMPHOCYTES NFR BLD AUTO: 16 % (ref 14–44)
MAGNESIUM SERPL-MCNC: 1.5 MG/DL (ref 1.9–2.7)
MCH RBC QN AUTO: 31.7 PG (ref 26.8–34.3)
MCHC RBC AUTO-ENTMCNC: 32.1 G/DL (ref 31.4–37.4)
MCV RBC AUTO: 99 FL (ref 82–98)
MONOCYTES # BLD AUTO: 0.41 THOUSAND/ÂΜL (ref 0.17–1.22)
MONOCYTES NFR BLD AUTO: 8 % (ref 4–12)
NEUTROPHILS # BLD AUTO: 3.92 THOUSANDS/ÂΜL (ref 1.85–7.62)
NEUTS SEG NFR BLD AUTO: 74 % (ref 43–75)
NRBC BLD AUTO-RTO: 0 /100 WBCS
PLATELET # BLD AUTO: 59 THOUSANDS/UL (ref 149–390)
PMV BLD AUTO: 12.2 FL (ref 8.9–12.7)
POTASSIUM SERPL-SCNC: 4.2 MMOL/L (ref 3.5–5.3)
RBC # BLD AUTO: 3.03 MILLION/UL (ref 3.88–5.62)
SODIUM SERPL-SCNC: 131 MMOL/L (ref 135–147)
WBC # BLD AUTO: 5.26 THOUSAND/UL (ref 4.31–10.16)

## 2025-05-26 PROCEDURE — 82330 ASSAY OF CALCIUM: CPT

## 2025-05-26 PROCEDURE — 85025 COMPLETE CBC W/AUTO DIFF WBC: CPT

## 2025-05-26 PROCEDURE — 99232 SBSQ HOSP IP/OBS MODERATE 35: CPT | Performed by: INTERNAL MEDICINE

## 2025-05-26 PROCEDURE — 82948 REAGENT STRIP/BLOOD GLUCOSE: CPT

## 2025-05-26 PROCEDURE — 97167 OT EVAL HIGH COMPLEX 60 MIN: CPT

## 2025-05-26 PROCEDURE — 80048 BASIC METABOLIC PNL TOTAL CA: CPT

## 2025-05-26 PROCEDURE — 97163 PT EVAL HIGH COMPLEX 45 MIN: CPT

## 2025-05-26 PROCEDURE — 83735 ASSAY OF MAGNESIUM: CPT

## 2025-05-26 RX ORDER — LORAZEPAM 1 MG/1
2 TABLET ORAL ONCE
Status: COMPLETED | OUTPATIENT
Start: 2025-05-26 | End: 2025-05-26

## 2025-05-26 RX ORDER — LORAZEPAM 2 MG/ML
4 INJECTION INTRAMUSCULAR ONCE
Status: COMPLETED | OUTPATIENT
Start: 2025-05-26 | End: 2025-05-26

## 2025-05-26 RX ORDER — METOPROLOL TARTRATE 1 MG/ML
5 INJECTION, SOLUTION INTRAVENOUS ONCE
Status: COMPLETED | OUTPATIENT
Start: 2025-05-26 | End: 2025-05-26

## 2025-05-26 RX ADMIN — PYRIDOXINE HCL TAB 50 MG 100 MG: 50 TAB at 14:49

## 2025-05-26 RX ADMIN — Medication 3 MG: at 22:02

## 2025-05-26 RX ADMIN — ATORVASTATIN CALCIUM 40 MG: 40 TABLET, FILM COATED ORAL at 14:47

## 2025-05-26 RX ADMIN — NICOTINE 1 PATCH: 21 PATCH, EXTENDED RELEASE TRANSDERMAL at 09:31

## 2025-05-26 RX ADMIN — METOPROLOL TARTRATE 5 MG: 5 INJECTION INTRAVENOUS at 13:54

## 2025-05-26 RX ADMIN — GABAPENTIN 300 MG: 300 CAPSULE ORAL at 16:36

## 2025-05-26 RX ADMIN — LORAZEPAM 2 MG: 1 TABLET ORAL at 01:48

## 2025-05-26 RX ADMIN — ASPIRIN 81 MG: 81 TABLET, CHEWABLE ORAL at 14:46

## 2025-05-26 RX ADMIN — THIAMINE HCL TAB 100 MG 100 MG: 100 TAB at 14:50

## 2025-05-26 RX ADMIN — GABAPENTIN 300 MG: 300 CAPSULE ORAL at 21:37

## 2025-05-26 RX ADMIN — VANCOMYCIN HYDROCHLORIDE 125 MG: 125 CAPSULE ORAL at 17:54

## 2025-05-26 RX ADMIN — LORAZEPAM 4 MG: 2 INJECTION INTRAMUSCULAR; INTRAVENOUS at 03:00

## 2025-05-26 RX ADMIN — VANCOMYCIN HYDROCHLORIDE 125 MG: 125 CAPSULE ORAL at 00:02

## 2025-05-26 RX ADMIN — Medication 800 MG: at 17:54

## 2025-05-26 RX ADMIN — RANOLAZINE 500 MG: 500 TABLET, FILM COATED, EXTENDED RELEASE ORAL at 17:54

## 2025-05-26 RX ADMIN — FOLIC ACID 1 MG: 1 TABLET ORAL at 14:47

## 2025-05-26 RX ADMIN — B-COMPLEX W/ C & FOLIC ACID TAB 1 TABLET: TAB at 14:50

## 2025-05-26 RX ADMIN — ANTACID TABLETS 1000 MG: 500 TABLET, CHEWABLE ORAL at 14:48

## 2025-05-26 RX ADMIN — DILTIAZEM HYDROCHLORIDE 120 MG: 60 CAPSULE, EXTENDED RELEASE ORAL at 17:54

## 2025-05-26 RX ADMIN — METOPROLOL SUCCINATE 150 MG: 100 TABLET, EXTENDED RELEASE ORAL at 21:37

## 2025-05-26 RX ADMIN — SODIUM CHLORIDE, SODIUM LACTATE, POTASSIUM CHLORIDE, AND CALCIUM CHLORIDE 50 ML/HR: .6; .31; .03; .02 INJECTION, SOLUTION INTRAVENOUS at 00:05

## 2025-05-26 RX ADMIN — Medication 250 MG: at 17:54

## 2025-05-26 RX ADMIN — VANCOMYCIN HYDROCHLORIDE 125 MG: 125 CAPSULE ORAL at 23:17

## 2025-05-26 RX ADMIN — SODIUM CHLORIDE, SODIUM LACTATE, POTASSIUM CHLORIDE, AND CALCIUM CHLORIDE 50 ML/HR: .6; .31; .03; .02 INJECTION, SOLUTION INTRAVENOUS at 19:10

## 2025-05-26 RX ADMIN — VANCOMYCIN HYDROCHLORIDE 125 MG: 125 CAPSULE ORAL at 05:54

## 2025-05-26 RX ADMIN — APIXABAN 5 MG: 5 TABLET, FILM COATED ORAL at 17:57

## 2025-05-26 RX ADMIN — PANTOPRAZOLE SODIUM 40 MG: 40 TABLET, DELAYED RELEASE ORAL at 14:49

## 2025-05-26 RX ADMIN — TAMSULOSIN HYDROCHLORIDE 0.4 MG: 0.4 CAPSULE ORAL at 16:36

## 2025-05-26 NOTE — NURSING NOTE
Patient lethargic. RN did not give oral meds due to patient not able to stay awake, or follow commands. Resident team aware. Made  patient NPO.

## 2025-05-26 NOTE — ASSESSMENT & PLAN NOTE
Lab Results   Component Value Date    HGBA1C 5.2 05/05/2025       Recent Labs     05/25/25  1113 05/25/25  1535 05/25/25  1959 05/26/25  0707   POCGLU 137 142* 173* 92       Blood Sugar Average: Last 72 hrs:  (P) 116  Chronic, well-controlled  Medications include metformin 500 mg twice daily  Patient noting that he had not been taking his medications or eating regularly for several days  Hypoglycemia noted on admission, improving with p.o. intake and IVF with D5    Plan:  Hold home metformin  Insulin sliding scale  Carb controlled diet  Hypoglycemia protocol

## 2025-05-26 NOTE — UTILIZATION REVIEW
*PLEASE SEE INITIAL REVIEW COMPLETED 5/25/25 BY YEVGENIY JAMIL RN    Continued Stay Review / DAY 3 REVIEW:  Date: 5/26/25                        Current Patient Class: Inpatient    Current Level of Care: Med Surg    HPI: 66 y/o male with PMHx HTN, HLD, CAD/ CABG, Atrial Fib, COPD, Prostate Cancer, alcohol abuse  - initially presented to [facility] + placed in Observation on 5/24/25 2nd alcohol withdrawal, SIRS, pneumonitis with Hypotension and Hypoxia/ SpO2 84 %, ENMA.  Converted to Inpatient 5/25/25 2nd acute alcohol withdraw with active symptoms requiring continued Inpatient management after observation + > 2 midnites.       5/25/25 DAY 2 - OBSERVATION TO INPATIENT:  alcohol intoxication - withdraw  CIWA=7 ( headache and tremor).  Remains on iv LR 50/hr, po thiamine, po vancomycin, po B6, po multi vitamin.     5/26/25 DAY 3 - INPATIENT:  Has surpassed a 2nd midnight with active treatments and services.  SpO2 92-95% on 2L NC   Overnight was extremely agitated and restless.  He was trying to get off of bed and throwing items, threatening to leave so he can drink alcohol.  He received 2 mg p.o. Ativan around 1:30 AM for CIWA 13 and again 4 mg IV Ativan at 3 AM due to CIWA of 18.  At the time of examination patient was very sleepy however was arousable   MD Certification Statement: The patient will continue to require additional inpatient hospital stay due to alcohol withdrawal     Alcohol withdrawal (HCC)  Chronic -history of alcohol abuse with recurrent admissions for alcohol withdrawal  Stating last drink was on a.m. of presentation  Noting some fine tremors on examination especially when trying to complete task like eating. Endorsing that he has not been taking his medications including naltrexone or vitamins for at least a week.  Additionally noting that he has not been eating regularly for several days     ED course: 1 g Rocephin, 10 mg Valium IV, 2 g mag sulfate, D5 25 g and 1 L normal saline bolus, DuoNeb x 1    Patient interested in outpatient Alcohol  rehab     Plan:  [facility] protocol  Case management consulted, appreciate reccs  Pending B12, folate levels  Continue thiamine, folic acid, B6, multivitamin supplementation  Seizure precautions, Aspiration precautions, Fall precautions  Monitor mentation  Telemetry     Scheduled Medications:  apixaban, 5 mg, Oral, BID  aspirin, 81 mg, Oral, Daily  atorvastatin, 40 mg, Oral, Daily  calcium carbonate, 1,000 mg, Oral, Daily  diltiazem, 120 mg, Oral, BID  ferrous sulfate, 325 mg, Oral, Daily With Breakfast  fluticasone-vilanterol, 1 puff, Inhalation, Daily  folic acid, 1 mg, Oral, Daily  gabapentin, 300 mg, Oral, TID  insulin lispro, 1-5 Units, Subcutaneous, TID With Meals  insulin lispro, 1-5 Units, Subcutaneous, HS  magnesium Oxide, 800 mg, Oral, BID  metoprolol succinate, 150 mg, Oral, Q12H SEDA  multivitamin stress formula, 1 tablet, Oral, Daily  nicotine, 1 patch, Transdermal, Daily  pantoprazole, 40 mg, Oral, Daily  pyridoxine, 100 mg, Oral, Daily  ranolazine, 500 mg, Oral, BID  saccharomyces boulardii, 250 mg, Oral, BID  tamsulosin, 0.4 mg, Oral, Daily With Dinner  vitamin B-1, 100 mg, Oral, Daily  vancomycin oral (capsules or solution), 125 mg, Oral, Q6H [facility]    Continuous IV Infusions:  lactated ringers, 50 mL/hr, Intravenous, Continuous    PRN Meds:  acetaminophen, 650 mg, Oral, Q6H PRN  albuterol, 2 puff, Inhalation, Q4H PRN  aluminum-magnesium hydroxide-simethicone, 30 mL, Oral, Q4H PRN  levalbuterol, 0.63 mg, Nebulization, Q8H PRN - 5/25 X 2    Discharge Plan:   To be determined   Inpatient Case Management following for all discharge needs'    Vital Signs (last 3 days)       Date/Time Temp Pulse Resp BP MAP (mmHg) SpO2 Calculated FIO2 (%) - Nasal Cannula Nasal Cannula O2 Flow Rate (L/min) O2 Device Patient Position - Orthostatic VS CIWA-Ar Total Pain    05/26/25 07:56:45 98.4 °F (36.9 °C) 86 -- 142/79 100 92 % -- -- -- -- -- --    05/26/25 07:55:47 98.4 °F (36.9 °C) --  -- 142/79 100 -- -- -- -- -- -- --    05/26/25 07:53:27 98.4 °F (36.9 °C) -- -- 142/79 100 -- -- -- -- -- -- --    05/26/25 04:09:35 -- 79 18 125/77 93 95 % -- -- -- -- 9 --    05/26/25 02:45:37 -- 77 -- 111/61 78 96 % -- -- -- -- 18 --    05/26/25 0140 -- 79 -- 115/63 -- -- -- -- -- -- 13 --    05/26/25 0100 -- 71 -- 111/62 -- -- -- -- -- -- -- --    05/26/25 00:51:12 98.3 °F (36.8 °C) 63 18 111/62 78 97 % -- -- -- -- -- --    05/25/25 2317 -- 66 -- 107/61 -- -- -- -- -- -- 7 --    05/25/25 22:11:14 98.6 °F (37 °C) 65 -- 107/61 76 89 % -- -- -- -- -- --    05/25/25 2100 -- -- -- -- -- 96 % -- -- None (Room air) -- -- No Pain    05/25/25 1953 -- -- -- -- -- 94 % -- -- None (Room air) -- -- --    05/25/25 1917 -- 69 -- 127/53 -- -- -- -- -- -- 7 --    05/25/25 19:02:44 98.6 °F (37 °C) 75 18 127/53 78 96 % -- -- -- -- -- --    05/25/25 15:17:21 99 °F (37.2 °C) -- 17 151/87 108 -- -- -- -- -- 7 --    05/25/25 14:26:12 98.9 °F (37.2 °C) -- 19 148/84 105 -- -- -- -- -- -- --    05/25/25 1346 97.9 °F (36.6 °C) 93 18 142/78 -- 92 % -- -- -- -- -- --    05/25/25 12:55:48 -- 101 -- 118/86 97 -- -- -- -- -- 7 --    05/25/25 1246 99 °F (37.2 °C) 99 19 -- -- 93 % -- -- -- -- -- --    05/25/25 0800 -- -- -- -- -- -- -- -- -- -- 7 --    05/25/25 07:58:04 99.2 °F (37.3 °C) 102 19 146/74 98 96 % 28 2 L/min Nasal cannula Lying -- 4    05/25/25 06:11:49 98.9 °F (37.2 °C) 97 18 144/78 100 97 % 28 2 L/min Nasal cannula -- -- --    05/25/25 0408 -- 86 -- 118/66 -- -- -- -- -- -- 6 --    05/25/25 0400 -- 79 -- -- -- 98 % 32 3 L/min Nasal cannula -- -- --    05/25/25 0328 -- 78 -- -- -- 88 % -- -- None (Room air) -- -- --    05/25/25 0315 -- -- -- -- -- 90 % -- -- -- -- -- --    05/25/25 0123 -- -- -- -- -- 98 % -- -- None (Room air) -- -- --    05/25/25 0054 -- 97 -- -- -- 94 % -- -- None (Room air) -- -- --    05/25/25 00:08:58 98 °F (36.7 °C) 87 -- 158/86 110 98 % 32 3 L/min Nasal cannula -- -- --    05/25/25 00:07:58 -- 107 -- 158/86  110 98 % -- -- -- -- -- --    05/25/25 00:07:17 -- 103 18 158/86 110 98 % -- -- -- -- 7 --    05/24/25 2200 -- -- -- -- -- -- -- -- -- -- -- No Pain    05/24/25 21:54:05 98.1 °F (36.7 °C) 93 18 116/72 87 97 % 32 3 L/min Nasal cannula Lying -- --    05/24/25 2100 -- 87 18 122/73 92 99 % -- -- Nasal cannula Sitting -- --    05/24/25 2045 -- 85 18 -- -- 99 % 36 4 L/min Nasal cannula -- -- --    05/24/25 2030 -- 99 18 144/79 104 94 % -- -- Nasal cannula Sitting -- --    05/24/25 2017 -- 82 -- 137/84 -- -- -- -- -- -- 7 --    05/24/25 2000 -- 82 16 137/84 104 99 % 28 2 L/min Nasal cannula Lying -- --    05/24/25 1900 -- 90 20 106/57 75 93 % 28 2 L/min Nasal cannula -- -- --    05/24/25 1830 -- 72 17 112/56 76 98 % -- -- Nasal cannula -- -- --    05/24/25 1800 -- 71 20 100/59 77 98 % -- -- Nasal cannula -- -- --    05/24/25 1730 -- 73 17 117/62 82 98 % -- -- Nasal cannula -- -- --    05/24/25 1700 -- 85 17 126/83 100 98 % -- -- Nasal cannula -- -- --    05/24/25 1630 -- 71 16 102/64 77 97 % 32 3 L/min -- -- -- --    05/24/25 1627 -- -- -- -- -- 96 % -- -- Nasal cannula -- -- --    05/24/25 1615 -- 74 16 102/59 79 84 % -- -- None (Room air) -- -- --    05/24/25 1549 96.9 °F (36.1 °C) 85 14 86/51 -- 88 % -- -- None (Room air) Lying -- --     Weight (last 2 days)       Date/Time Weight    05/26/25 0600 74.2 (163.58)    05/25/25 0600 74.5 (164.3)    05/24/25 21:54:05 74.5 (164.3)      CIWA-Ar Score       Row Name 05/26/25 04:09:35 05/26/25 02:45:37 05/26/25 0140       CIWA-Ar    BP -- -- 115/63    Pulse -- -- 79    Nausea and Vomiting 0 0 0    Tactile Disturbances 1 1 1    Tremor 4 5 5    Auditory Disturbances 0 1 0    Paroxysmal Sweats 1 2 1    Visual Disturbances 0 2 0    Anxiety 1 3 2    Headache, Fullness in Head 1 1 1    Agitation 1 2 2    Orientation and Clouding of Sensorium 0 1 1    CIWA-Ar Total 9 18 13      Row Name 05/26/25 0100 05/25/25 2317 05/25/25 1917       CIWA-Ar    /62 107/61 127/53    Pulse 71 66 69     Nausea and Vomiting -- 0 0    Tactile Disturbances -- 1 1    Tremor -- 4 4    Auditory Disturbances -- 0 0    Paroxysmal Sweats -- 0 0    Visual Disturbances -- 0 0    Anxiety -- 1 1    Headache, Fullness in Head -- 1 1    Agitation -- 0 0    Orientation and Clouding of Sensorium -- 0 0    CIWA-Ar Total -- 7 7      Row Name 05/25/25 15:17:21 05/25/25 12:55:48 05/25/25 0800       CIWA-Ar    Pulse -- 101 --    Nausea and Vomiting 0 0 0    Tactile Disturbances 0 0 0    Tremor 6 6 7    Auditory Disturbances 0 0 0    Paroxysmal Sweats 0 0 0    Visual Disturbances 0 0 0    Anxiety 0 0 0    Headache, Fullness in Head 1 0 0    Agitation 0 1 0    Orientation and Clouding of Sensorium 0 0 0    CIWA-Ar Total 7 7 7      Row Name 05/25/25 0408 05/25/25 00:07:17 05/24/25 2017       CIWA-Ar    BP -- -- 137/84    Pulse -- -- 82    Nausea and Vomiting 0 0 1    Tactile Disturbances 0 0 0    Tremor 4 5 4    Auditory Disturbances 0 0 0    Paroxysmal Sweats 0 0 0    Visual Disturbances 0 0 0    Anxiety 1 1 1    Headache, Fullness in Head 1 1 1    Agitation 0 0 0    Orientation and Clouding of Sensorium 0 0 0    CIWA-Ar Total 6 7 7              I/O 05/24  0701 05/25  0701 05/26  0701    Intake   (mL/kg) 1,050   (14.1) -- --    IV Piggyback 1,050 -- --    Output 150 400 --    Urine   (mL/kg/hr) 150 400   (0.2) --    Net +900 -400 --    Unmeasured Urine Occurrence -- 1  x 1  x    Unmeasured Stool Occurrence -- --        Pertinent Labs/Diagnostic Results:   XR chest 1 view portable   Final Interpretation by Josr Sun MD (05/25 1113)      No acute cardiopulmonary disease.     Results from last 7 days   Lab Units 05/25/25  0607 05/24/25  1614 05/21/25  0159   WBC Thousand/uL 7.96 6.49 4.21*   HEMOGLOBIN g/dL 9.4* 10.7* 10.9*   HEMATOCRIT % 29.2* 34.4* 34.0*   PLATELETS Thousands/uL 97* 135* 187   TOTAL NEUT ABS Thousands/µL 6.23 4.73  --    BANDS PCT %  --   --  1     Results from last 7 days   Lab Units 05/25/25  0649  05/25/25  0100 05/24/25  1614 05/21/25  0159   SODIUM mmol/L 141 143 142 145   POTASSIUM mmol/L 3.8 3.9 4.9 4.0   CHLORIDE mmol/L 101 103 100 104   CO2 mmol/L 24 23 24 25   ANION GAP mmol/L 16* 17* 18* 16*   BUN mg/dL 17 20 19 17   CREATININE mg/dL 0.90 1.13 1.39* 0.95   EGFR ml/min/1.73sq m 90 68 53 84   CALCIUM mg/dL 7.5* 7.3* 7.3* 8.0*   MAGNESIUM mg/dL 2.2  --  1.3*  --      Results from last 7 days   Lab Units 05/25/25  0607 05/24/25  2229 05/24/25  1614   AST U/L 63*  --  99*   ALT U/L 39  --  47   ALK PHOS U/L 145*  --  155*   TOTAL PROTEIN g/dL 6.4  --  6.7   ALBUMIN g/dL 3.2*  --  3.2*   TOTAL BILIRUBIN mg/dL 0.68  --  0.74   AMMONIA umol/L  --  33  --      Results from last 7 days   Lab Units 05/26/25  0707 05/25/25  1959 05/25/25  1535 05/25/25  1113 05/25/25  0720 05/24/25  2221 05/24/25  1603   POC GLUCOSE mg/dl 92 173* 142* 137 88 112 68     Results from last 7 days   Lab Units 05/25/25  0607 05/25/25  0100 05/24/25  1614 05/21/25  0159   GLUCOSE RANDOM mg/dL 82 106 65 128     Results from last 7 days   Lab Units 05/24/25  2325   CLARITY UA  Clear   COLOR UA  Light Orange   SPEC GRAV UA  >=1.030   PH UA  6.0   GLUCOSE UA mg/dl Negative   KETONES UA mg/dl 10 (1+)*   BLOOD UA  Small*   PROTEIN UA mg/dl 100 (2+)*   NITRITE UA  Negative   BILIRUBIN UA  Small*   UROBILINOGEN UA (BE) mg/dl 4.0*   LEUKOCYTES UA  Negative   WBC UA /hpf 2-4   RBC UA /hpf 1-2   BACTERIA UA /hpf Occasional   EPITHELIAL CELLS WET PREP /hpf Occasional       Results from last 7 days   Lab Units 05/25/25  1044 05/21/25  0159   ETHANOL LVL mg/dL <10 254*     Network Utilization Review Department  ATTENTION: Please call with any questions or concerns to [phone] and carefully listen to the prompts so that you are directed to the right person. All voicemails are confidential.   For Discharge needs, contact Care Management DC Support Team at [phone] opt. [phone]  Send all requests for admission clinical reviews, approved or denied  determinations and any other requests to dedicated fax number below belonging to the campus where the patient is receiving treatment. List of dedicated fax numbers for the Facilities:  FACILITY NAME UR FAX NUMBER   ADMISSION DENIALS (Administrative/Medical Necessity) [phone]   DISCHARGE SUPPORT TEAM (NETWORK) [phone]   PARENT CHILD HEALTH (Maternity/NICU/Pediatrics) [phone]   [facility] [phone]   [facility] [phone]   [facility] [phone]   [facility] [phone]   [facility] [phone]   [facility] [phone]   [facility] [phone]   [facility] [phone]   [facility] [phone]   [facility] [phone]   [facility] [phone]   [facility] [phone]

## 2025-05-26 NOTE — ASSESSMENT & PLAN NOTE
Wt Readings from Last 3 Encounters:   05/26/25 74.2 kg (163 lb 9.3 oz)   05/13/25 79.1 kg (174 lb 4.8 oz)   05/08/25 86 kg (189 lb 9.5 oz)     Chronic - appears euvolemic on admission  Echo (2/2025): EF 50 to 54%, wall motion could not be accurately assessed.  Indeterminate diastolic function.  RV cavity normal with reduced systolic function and abnormal tricuspid annular plane systolic excursion less than 1.7 cm.  LA severely dilated.  RA pressure estimated greater than 15 mmHg.  Estimated pulmonary artery systolic pressure is 63.9 mmHg.    Plan:  I/Os  Low sodium diet  Daily weights   Gentle IVF

## 2025-05-26 NOTE — ASSESSMENT & PLAN NOTE
Chronic -history of alcohol abuse with recurrent admissions for alcohol withdrawal  Stating last drink was on a.m. of presentation  Noting some fine tremors on examination especially when trying to complete task like eating. Endorsing that he has not been taking his medications including naltrexone or vitamins for at least a week.  Additionally noting that he has not been eating regularly for several days    ED course: 1 g Rocephin, 10 mg Valium IV, 2 g mag sulfate, D5 25 g and 1 L normal saline bolus, DuoNeb x 1    Patient interested in outpatient Alcohol  rehab    Plan:  [facility] protocol  Case management consulted, appreciate reccs  Pending B12, folate levels  Continue thiamine, folic acid, B6, multivitamin supplementation  Seizure precautions, Aspiration precautions, Fall precautions  Monitor mentation  Telemetry

## 2025-05-26 NOTE — PHYSICAL THERAPY NOTE
"   PT EVALUATION     05/26/25 1225   PT Last Visit   PT Visit Date 05/26/25   Note Type   Note type Evaluation   Pain Assessment   Pain Assessment Tool 0-10   Pain Score No Pain   Restrictions/Precautions   Other Precautions Fall Risk;Bed Alarm;Chair Alarm;Cognitive;Contact/isolation   Home Living   Type of Home Apartment  ([facility])   Home Layout One level;Elevator   Bathroom Shower/Tub Tub/shower unit   Bathroom Equipment Grab bars in shower   Home Equipment Cane  (rollator)   Prior Function   Level of [place] Independent with ADLs;Independent with functional mobility   Lives With Alone   Receives Help From Family;Friend(s)   Falls in the last 6 months >10   General   Additional Pertinent History Pt admitted with alcohol withdrawal.  Pt was admitted recently with the same and PA home.Pt has been admitted to the ED/hospital >10 times this year due to intoxication/falls.  Pt currently has multiple skin tears, RN Jose J is aware.   Family/Caregiver Present No   Cognition   Overall Cognitive Status Impaired   Arousal/Participation Cooperative   Orientation Level Oriented to person;Oriented to place   Following Commands Follows multistep commands with increased time or repetition   Subjective   Subjective \"I am going home today.\"  \"I joined the gym.  I want to start using the treadmill\"   RUE Assessment   RUE Assessment WFL   LUE Assessment   LUE Assessment WFL   RLE Assessment   RLE Assessment WFL   LLE Assessment   LLE Assessment WFL   Bed Mobility   Supine to Sit 5  Supervision   Sit to Supine 5  Supervision   Additional items Increased time required   Transfers   Sit to Stand 5  Supervision   Stand to Sit 5  Supervision   Stand pivot 5  Supervision   Additional items   (rolling walker)   Ambulation/Elevation   Gait pattern   (flexed posture, normal gait speed)   Gait Assistance 5  Supervision   Assistive Device Rolling walker   Distance 200 feet   Balance   Static Sitting Fair +   Static Standing Fair "   Ambulatory Fair -   Activity Tolerance   Activity Tolerance Patient tolerated treatment well  (Activity limited by multiple bleeding skin tears noted while walking.)   Assessment   Prognosis Good   Problem List Decreased strength;Decreased endurance;Impaired balance;Decreased mobility;Impaired judgement   Assessment Pt is 65 y.o. male seen for PT evaluation s/p admit to [facility] on 5/24/2025 w/ Alcohol withdrawal (HCC). PT consulted to assess pt's functional mobility and d/c needs. Order placed for PT eval and tx, w/ activity as tolerated order.  Co-morbidities affecting patient's physical performance include: ambulatory dysfunction, CAD, smoker, DM, COPD, ETOH use, afib, falls.  Personal factors affecting patient at time of initial evaluation include: ambulating with assistive device, inability to navigate community distances, limited home support, positive fall history, tobacco use , and limited insight into impairments.         Prior to admission, patient was independent with functional mobility with a walker or cane and independent with ADLS.  Upon evaluation: Pt ambulated 200 feet with supervision assist with a walker with a generally steady gait.            Please find objective findings from Physical Therapy assessment regarding body systems outlined above with impairments and limitations including weakness, impaired balance, decreased functional mobility tolerance, decreased safety awareness, impaired judgement, fall risk, and decreased cognition.The Barthel Index was used as a functional outcome tool presenting with a score of Barthel Index Score: 65 today indicating moderate limitations of functional mobility and ADLS.  Patient's clinical presentation is currently unstable/unpredictable as seen in patient's presentation of varying levels of cognitive performance, increased fall risk, new onset of impairment of functional mobility, and new onset of weakness. Pt would benefit from continued  "Physical Therapy treatment to address deficits as defined above and maximize level of functional mobility.     As demonstrated by objective findings, the assigned level of complexity for this evaluation is high.    The patient's AM-PAC Basic Mobility Inpatient Short Form Raw Score is 18. A Raw score of greater than 16 suggests the patient may benefit from discharge to home. Please also refer to the recommendation of the Physical Therapist for safe discharge planning.   Goals   Patient Goals \"go to the gym\"   STG Expiration Date 06/02/25   Short Term Goal #1 1. independent bed mobility,   2. independent transfers bed to chair and sit to stand,   3. independent ambulation indoor level surfaces with least restrictive device 150 feet   LTG Expiration Date 06/09/25   Long Term Goal #1 1. no falls,   2. independent ambulation outdoor level surfaces with least restrictive device 300 feet so pt can walk to the bus and go shopping   Plan   Treatment/Interventions Therapeutic exercise;LE strengthening/ROM;Functional transfer training;Endurance training;Spoke to nursing;Gait training;Bed mobility;Equipment eval/education;Patient/family training   PT Frequency 3-5x/wk   Discharge Recommendation   Rehab Resource Intensity Level, PT III (Minimum Resource Intensity)  If pt remains sober.    AM-PAC Basic Mobility Inpatient   Turning in Flat Bed Without Bedrails 4   Lying on Back to Sitting on Edge of Flat Bed Without Bedrails 3   Moving Bed to Chair 3   Standing Up From Chair Using Arms 3   Walk in Room 3   Climb 3-5 Stairs With Railing 2   Basic Mobility Inpatient Raw Score 18   Basic Mobility Standardized Score 41.05   [facility] Highest Level Of Mobility   -HLM Goal 6: Walk 10 steps or more   -HLM Achieved 7: Walk 25 feet or more   Barthel Index   Feeding 10   Bathing 0   Grooming Score 0   Dressing Score 5   Bladder Score 10   Bowels Score 10   Toilet Use Score 5   Transfers (Bed/Chair) Score 10   Mobility (Level Surface) " Score 10   Stairs Score 5   Barthel Index Score 65   End of Consult   Patient Position at End of Consult All needs within reach;Bed/Chair alarm activated;Supine  (Pt declined sitting up OOB.)   Licensure   [place] License Number  Cassie Dominguez PT [id number]

## 2025-05-26 NOTE — ASSESSMENT & PLAN NOTE
>>ASSESSMENT AND PLAN FOR COPD, SEVERITY TO BE DETERMINED (HCC) WRITTEN ON 5/26/2025  5:50 AM BY KYAW GRIMES MD    Chronic, not following with pulmonology  Home inhalers include albuterol and Breo  Patient noting that he had not been compliant with home medications or inhalers   Decreased breath sounds bilaterally with intermittent wheezing at bases    Plan:  Resume home inhalers  PRN Xopenex in setting of afib

## 2025-05-26 NOTE — PLAN OF CARE
Problem: Potential for Falls  Goal: Patient will remain free of falls  Description: INTERVENTIONS:  - Educate patient/family on patient safety including physical limitations  - Instruct patient to call for assistance with activity   - Consider consulting OT/PT to assist with strengthening/mobility based on AM PAC & JH-HLM score  - Consult OT/PT to assist with strengthening/mobility   - Keep Call bell within reach  - Keep bed low and locked with side rails adjusted as appropriate  - Keep care items and personal belongings within reach  - Initiate and maintain comfort rounds  - Make Fall Risk Sign visible to staff  - Offer Toileting every 2 Hours, in advance of need  - Initiate/Maintain bed alarm  - Obtain necessary fall risk management equipment: bed alarm, yellow socks   - Apply yellow socks and bracelet for high fall risk patients  - Consider moving patient to room near nurses station  Outcome: Progressing     Problem: PAIN - ADULT  Goal: Verbalizes/displays adequate comfort level or baseline comfort level  Description: Interventions:  - Encourage patient to monitor pain and request assistance  - Assess pain using appropriate pain scale  - Administer analgesics as ordered based on type and severity of pain and evaluate response  - Implement non-pharmacological measures as appropriate and evaluate response  - Consider cultural and social influences on pain and pain management  - Notify physician/advanced practitioner if interventions unsuccessful or patient reports new pain  - Educate patient/family on pain management process including their role and importance of  reporting pain   - Provide non-pharmacologic/complimentary pain relief interventions  Outcome: Progressing     Problem: INFECTION - ADULT  Goal: Absence or prevention of progression during hospitalization  Description: INTERVENTIONS:  - Assess and monitor for signs and symptoms of infection  - Monitor lab/diagnostic results  - Monitor all insertion  sites, i.e. indwelling lines, tubes, and drains  - Monitor endotracheal if appropriate and nasal secretions for changes in amount and color  - Gonzales appropriate cooling/warming therapies per order  - Administer medications as ordered  - Instruct and encourage patient and family to use good hand hygiene technique  - Identify and instruct in appropriate isolation precautions for identified infection/condition  Outcome: Progressing  Goal: Absence of fever/infection during neutropenic period  Description: INTERVENTIONS:  - Monitor WBC  - Perform strict hand hygiene  - Limit to healthy visitors only  - No plants, dried, fresh or silk flowers with turpin in patient room  Outcome: Progressing     Problem: SAFETY ADULT  Goal: Patient will remain free of falls  Description: INTERVENTIONS:  - Educate patient/family on patient safety including physical limitations  - Instruct patient to call for assistance with activity   - Consider consulting OT/PT to assist with strengthening/mobility based on AM PAC & -HLM score  - Consult OT/PT to assist with strengthening/mobility   - Keep Call bell within reach  - Keep bed low and locked with side rails adjusted as appropriate  - Keep care items and personal belongings within reach  - Initiate and maintain comfort rounds  - Make Fall Risk Sign visible to staff  - Offer Toileting every 2 Hours, in advance of need  - Initiate/Maintain bed alarm  - Obtain necessary fall risk management equipment: bed alarm, yellow socks   - Apply yellow socks and bracelet for high fall risk patients  - Consider moving patient to room near nurses station  Outcome: Progressing  Goal: Maintain or return to baseline ADL function  Description: INTERVENTIONS:  -  Assess patient's ability to carry out ADLs; assess patient's baseline for ADL function and identify physical deficits which impact ability to perform ADLs (bathing, care of mouth/teeth, toileting, grooming, dressing, etc.)  - Assess/evaluate cause of  self-care deficits   - Assess range of motion  - Assess patient's mobility; develop plan if impaired  - Assess patient's need for assistive devices and provide as appropriate  - Encourage maximum independence but intervene and supervise when necessary  - Involve family in performance of ADLs  - Assess for home care needs following discharge   - Consider OT consult to assist with ADL evaluation and planning for discharge  - Provide patient education as appropriate  - Monitor functional capacity and physical performance, use of AM PAC & JH-HLM   - Monitor gait, balance and fatigue with ambulation    Outcome: Progressing  Goal: Maintains/Returns to pre admission functional level  Description: INTERVENTIONS:  - Perform AM-PAC 6 Click Basic Mobility/ Daily Activity assessment daily.  - Set and communicate daily mobility goal to care team and patient/family/caregiver.   - Collaborate with rehabilitation services on mobility goals if consulted  - Perform Range of Motion 3 times a day.  - Reposition patient every 2 hours.  - Dangle patient 3 times a day  - Stand patient 3 times a day  - Ambulate patient 3 times a day  - Out of bed to chair 3 times a day   - Out of bed for meals 3 times a day  - Out of bed for toileting  - Record patient progress and toleration of activity level   Outcome: Progressing     Problem: DISCHARGE PLANNING  Goal: Discharge to home or other facility with appropriate resources  Description: INTERVENTIONS:  - Identify barriers to discharge w/patient and caregiver  - Arrange for needed discharge resources and transportation as appropriate  - Identify discharge learning needs (meds, wound care, etc.)  - Arrange for interpretive services to assist at discharge as needed  - Refer to Case Management Department for coordinating discharge planning if the patient needs post-hospital services based on physician/advanced practitioner order or complex needs related to functional status, cognitive ability, or  social support system  Outcome: Progressing     Problem: Knowledge Deficit  Goal: Patient/family/caregiver demonstrates understanding of disease process, treatment plan, medications, and discharge instructions  Description: Complete learning assessment and assess knowledge base.  Interventions:  - Provide teaching at level of understanding  - Provide teaching via preferred learning methods  Outcome: Progressing     Problem: Nutrition/Hydration-ADULT  Goal: Nutrient/Hydration intake appropriate for improving, restoring or maintaining nutritional needs  Description: Monitor and assess patient's nutrition/hydration status for malnutrition. Collaborate with interdisciplinary team and initiate plan and interventions as ordered.  Monitor patient's weight and dietary intake as ordered or per policy. Utilize nutrition screening tool and intervene as necessary. Determine patient's food preferences and provide high-protein, high-caloric foods as appropriate.     INTERVENTIONS:  - Monitor oral intake, urinary output, labs, and treatment plans  - Assess nutrition and hydration status and recommend course of action  - Evaluate amount of meals eaten  - Assist patient with eating if necessary   - Allow adequate time for meals  - Recommend/ encourage appropriate diets, oral nutritional supplements, and vitamin/mineral supplements  - Order, calculate, and assess calorie counts as needed  - Recommend, monitor, and adjust tube feedings and TPN/PPN based on assessed needs  - Assess need for intravenous fluids  - Provide specific nutrition/hydration education as appropriate  - Include patient/family/caregiver in decisions related to nutrition  Outcome: Progressing

## 2025-05-26 NOTE — ASSESSMENT & PLAN NOTE
>>ASSESSMENT AND PLAN FOR ABNORMAL LFTS WRITTEN ON 5/26/2025  5:50 AM BY KYAW GRIMES MD    AST 99, ALT 47, alk phos 155  Continue to monitor

## 2025-05-26 NOTE — PLAN OF CARE
Problem: PHYSICAL THERAPY ADULT  Goal: Performs mobility at highest level of function for planned discharge setting.  See evaluation for individualized goals.  Description: Treatment/Interventions: Therapeutic exercise, LE strengthening/ROM, Functional transfer training, Endurance training, Spoke to nursing, Gait training, Bed mobility, Equipment eval/education, Patient/family training          See flowsheet documentation for full assessment, interventions and recommendations.  Note: Prognosis: Good  Problem List: Decreased strength, Decreased endurance, Impaired balance, Decreased mobility, Impaired judgement  Assessment: Pt is 65 y.o. male seen for PT evaluation s/p admit to [facility] on 5/24/2025 w/ Alcohol withdrawal (HCC). PT consulted to assess pt's functional mobility and d/c needs. Order placed for PT eval and tx, w/ activity as tolerated order.  Co-morbidities affecting patient's physical performance include: ambulatory dysfunction, CAD, smoker, DM, COPD, ETOH use, afib, falls.  Personal factors affecting patient at time of initial evaluation include: ambulating with assistive device, inability to navigate community distances, limited home support, positive fall history, tobacco use , and limited insight into impairments.     Prior to admission, patient was independent with functional mobility with a walker or cane and independent with ADLS.  Upon evaluation: Pt ambulated 200 feet with supervision assist with a walker with a generally steady gait.        Please find objective findings from Physical Therapy assessment regarding body systems outlined above with impairments and limitations including weakness, impaired balance, decreased functional mobility tolerance, decreased safety awareness, impaired judgement, fall risk, and decreased cognition.The Barthel Index was used as a functional outcome tool presenting with a score of Barthel Index Score: 65 today indicating moderate limitations of  functional mobility and ADLS.  Patient's clinical presentation is currently unstable/unpredictable as seen in patient's presentation of varying levels of cognitive performance, increased fall risk, new onset of impairment of functional mobility, and new onset of weakness. Pt would benefit from continued Physical Therapy treatment to address deficits as defined above and maximize level of functional mobility.     As demonstrated by objective findings, the assigned level of complexity for this evaluation is high.The patient's -[facility] Basic Mobility Inpatient Short Form Raw Score is 18. A Raw score of greater than 16 suggests the patient may benefit from discharge to home. Please also refer to the recommendation of the Physical Therapist for safe discharge planning.        Rehab Resource Intensity Level, PT: III (Minimum Resource Intensity)    See flowsheet documentation for full assessment.

## 2025-05-26 NOTE — ASSESSMENT & PLAN NOTE
Previous history of recurrent C. Difficile  Started on p.o. vancomycin taper during previous hospitalization on 5/8. Patient reporting that he had not been compliant with vancomycin taper    Repeat C diff toxin PCR & EIA positive    Dc'ed Vancomycin taper  Started PO Vanc 125 mg Q6 (5/25 - ) to complete 2 week course   S/p Strict contact and hand hygiene  Monitored stool output     Discharged with vancomycin 125 mg Q6 for 2 weeks total  Follow-up with PCP outpatient at [place]

## 2025-05-26 NOTE — QUICK NOTE
Nurse notified resident of [facility] 13 >18 , patient trying to get off of bed.   Patient was given Oral Ativan per [facility] protocol.   Resident at bedside, patient seen comfortably resting in bed.

## 2025-05-26 NOTE — ASSESSMENT & PLAN NOTE
>>ASSESSMENT AND PLAN FOR TYPE 2 DIABETES MELLITUS WITH CHRONIC KIDNEY DISEASE (HCC) WRITTEN ON 5/26/2025  7:26 AM BY KYAW GRIMES MD    Lab Results   Component Value Date    HGBA1C 5.2 05/05/2025       Recent Labs     05/25/25  1113 05/25/25  1535 05/25/25 1959 05/26/25  0707   POCGLU 137 142* 173* 92       Blood Sugar Average: Last 72 hrs:  (P) 116  Chronic, well-controlled  Medications include metformin 500 mg twice daily  Patient noting that he had not been taking his medications or eating regularly for several days  Hypoglycemia noted on admission, improving with p.o. intake and IVF with D5    Plan:  Hold home metformin  Insulin sliding scale  Carb controlled diet  Hypoglycemia protocol

## 2025-05-26 NOTE — PROGRESS NOTES
Progress Note - Family Medicine   Name: Gergg Partida 65 y.o. male I MRN: [id number]  Unit/Bed#: 4 [place] 422-01 I Date of Admission: 2025   Date of Service: 2025 I Hospital Day: 1    Assessment & Plan  Alcohol withdrawal (HCC)  Chronic -history of alcohol abuse with recurrent admissions for alcohol withdrawal  Stating last drink was on a.m. of presentation  Noting some fine tremors on examination especially when trying to complete task like eating. Endorsing that he has not been taking his medications including naltrexone or vitamins for at least a week.  Additionally noting that he has not been eating regularly for several days    ED course: 1 g Rocephin, 10 mg Valium IV, 2 g mag sulfate, D5 25 g and 1 L normal saline bolus, DuoNeb x 1    Patient interested in outpatient Alcohol  rehab    Plan:  [facility] protocol  Case management consulted, appreciate reccs  Pending B12, folate levels  Continue thiamine, folic acid, B6, multivitamin supplementation  Seizure precautions, Aspiration precautions, Fall precautions  Monitor mentation  Telemetry   SIRS (systemic inflammatory response syndrome) (HCC)  As evidenced by tachycardia, tachypnea -query source of infection  WBC WNL, Pro-Vlad WNL x 1  UA: Light orange, high specific gravity, protein, ketones, 0 below tension, bilirubin, occult blood  History of recurrent admissions for similar concerns  Last CXR with pneumonitis versus pneumonia in right lobe  ED course: Azithromycin x 1, Rocephin x 1  Previously given vancomycin p.o. for C. Difficile, patient endorses that he had not been taking  Denies current diarrhea  IVF with D5  CXR: No acute cardiopulmonary disease.   Proca.09 < 0.09    Plan:    Hold off Abx  If fever, or constitutional sxs., consider restarting  Acute respiratory failure with hypoxia (HCC)  Presented to ED with hypoxia 84 to 88% on room air  Requiring 2 to 4 L nasal cannula on admission  Tolerating titration down on O2  Denying cough or  feeling short of breath  History of COPD, noncompliant with home medications    ED course: Rocephin x 1, azithromycin times  CXR: No acute cardiopulmonary disease.     Patient satting 92-95% on 2L NC    Plan:  Titrate O2 to keep SPO2 greater than 88%  Wean off O2 as able   Resume home inhalers  Incentive spirometry  Rest of plan per SIRS  Acute kidney injury superimposed on chronic kidney disease  (HCC)  Creatinine 1.39 on admission, baseline Cr 0.8-1  Patient reporting that he had not been eating for several days, presented with EtOH withdrawal  UA: 1+ ketones, small blood, 2+ protein, small bili, 4.0 urobilinogen, 2-4 hyaline casts, 0-3 granular casts     Labs: Cr 1.13 > 0.90    Plan:  Continue gentle IV rehydration w/ 50 mL/hr IV LR  Monitor I's and O's  Urinary retention protocol  Ordered PVR  Continue home Flomax  Increased anion gap  Likely starvation ketosis as patient reportedly not been eating regularly  CO2 24 on admission, AG 18  UA with ketones  Creatinine 1.39 on admission    Plan:  Continue gentle IVF  Monitor BMP  COPD, severity to be determined (HCC)  Chronic, not following with pulmonology  Home inhalers include albuterol and Breo  Patient noting that he had not been compliant with home medications or inhalers   Decreased breath sounds bilaterally with intermittent wheezing at bases    Plan:  Resume home inhalers  PRN Xopenex in setting of afib   Type 2 diabetes mellitus with chronic kidney disease (HCC)  Lab Results   Component Value Date    HGBA1C 5.2 05/05/2025       Recent Labs     05/25/25  1113 05/25/25  1535 05/25/25  1959 05/26/25  0707   POCGLU 137 142* 173* 92       Blood Sugar Average: Last 72 hrs:  (P) 116  Chronic, well-controlled  Medications include metformin 500 mg twice daily  Patient noting that he had not been taking his medications or eating regularly for several days  Hypoglycemia noted on admission, improving with p.o. intake and IVF with D5    Plan:  Hold home  metformin  Insulin sliding scale  Carb controlled diet  Hypoglycemia protocol  Dyslipidemia  Chronic, last lipid panel (11/24): Total cholesterol 153, , LDL 87, HDL 46.  Home regimen: Atorvastatin 40 mg.    Continue home Lipitor  History of prostate cancer  Stable, history of prostate cancer in 2020 s/p resection 2021.  Home regimen: Flomax 0.4 mg daily  Noting that he had not been taking his home medications    Continue home regimen  Monitor I's and O's  CAD (coronary artery disease)  H/O CAD s/p CABG.  Home regimen: ASA 81 mg, ranolazine 500 mg every 12 hours  Noting that he not been taking his medications    Continue home regimen  Nicotine abuse  Chronic, stable.  Continues to smoke daily.    Ordered nicotine 21 mg patch  Encourage smoking cessation  Atrial fibrillation (HCC)  Chronic, noted in ED on EKG  Rate on admission less than 100  Medications include Eliquis 5 mg twice daily, ASA 81 mg daily, Cardizem 100 mg twice daily, Toprol  mg  Noting that he not take his medications at home    Patient in Afib on Tele. HR avg 90s - 100s.     Plan:  Resume home medications  Monitor on telemetry  Monitor rate control  Chronic heart failure with preserved ejection fraction (HCC)  Wt Readings from Last 3 Encounters:   05/26/25 74.2 kg (163 lb 9.3 oz)   05/13/25 79.1 kg (174 lb 4.8 oz)   05/08/25 86 kg (189 lb 9.5 oz)     Chronic - appears euvolemic on admission  Echo (2/2025): EF 50 to 54%, wall motion could not be accurately assessed.  Indeterminate diastolic function.  RV cavity normal with reduced systolic function and abnormal tricuspid annular plane systolic excursion less than 1.7 cm.  LA severely dilated.  RA pressure estimated greater than 15 mmHg.  Estimated pulmonary artery systolic pressure is 63.9 mmHg.    Plan:  I/Os  Low sodium diet  Daily weights   Gentle IVF   QT prolongation  On admission EKG QT C interval 483  Admission mag 1.3    Plan:  Monitoring on telemetry  Replete electrolytes as  indicated   Avoid QT prolonging medications  Ambulatory dysfunction  Chronic, noting multiple falls at home  Patient reporting that he lives alone. History of alcohol abuse    PT OT ordered  Fall precautions  GERD (gastroesophageal reflux disease)  Chronic, stable.  Home regimen: Protonix 40 mg, Maalox 30 mL every 4 as needed, aluminum magnesium hydroxide 5 mL every 6 hours as needed.    Resume home medication   Hypertension  Chronic has a history of hypo and hypertension  Presented to ED with hypotension    Status post IVF, BPs generally normalized  Continue to monitor  Recurrent Clostridioides difficile diarrhea  Previous history of recurrent C. Difficile  Started on p.o. vancomycin taper during previous hospitalization on 5/8. Patient reporting that he had not been compliant with vancomycin taper    Resumed during hospitalization  Dc'ed that   Started PO Vanc 125 mg Q6   Strict contact and hand hygiene  Monitor stool output   Electrolyte abnormality  Mag 1.3 on admission  Monitor and replete electrolytes as indicated  Abnormal LFTs  AST 99, ALT 47, alk phos 155  Continue to monitor    VTE Pharmacologic Prophylaxis: VTE Score: 7 High Risk (Score >/= 5) - Pharmacological DVT Prophylaxis Ordered: apixaban (Eliquis). Sequential Compression Devices Ordered.    Mobility:   Basic Mobility Inpatient Raw Score: 15  JH-HLM Goal: 4: Move to chair/commode  JH-HLM Achieved: 6: Walk 10 steps or more  JH-HLM Goal achieved. Continue to encourage appropriate mobility.    Patient Centered Rounds: I performed bedside rounds with nursing staff today.   Discussions with Specialists or Other Care Team Provider: n/a    Education and Discussions with Family / Patient: Patient declined call to .     Current Length of Stay: 1 day(s)  Current Patient Status: Inpatient   Certification Statement: The patient will continue to require additional inpatient hospital stay due to alcohol withdrawal  Discharge Plan: Anticipate  discharge in 48-72 hrs to home.    Code Status: Level 1 - Full Code    Subjective     Patient seen and evaluated bedside.  Patient resting comfortably in bed in no acute distress.  Per overnight nursing patient was extremely agitated and restless.  He was trying to get off of bed and throwing items, threatening to leave so he can drink alcohol.  He received 2 mg p.o. Ativan around 1:30 AM for CIWA 13 and again 4 mg IV Ativan at 3 AM due to CIWA of 18.  At the time of examination patient was very sleepy however was arousable.  Patient denies any pain or concerns at this time.    Objective :  Temp:  [97.9 °F (36.6 °C)-99.2 °F (37.3 °C)] 98.3 °F (36.8 °C)  HR:  [] 79  BP: (107-151)/(53-87) 125/77  Resp:  [17-19] 18  SpO2:  [89 %-97 %] 95 %  O2 Device: None (Room air)  Nasal Cannula O2 Flow Rate (L/min):  [2 L/min] 2 L/min    Body mass index is 21 kg/m².     Input and Output Summary (last 24 hours):     Intake/Output Summary (Last 24 hours) at 5/26/2025 0726  Last data filed at 5/25/2025 1701  Gross per 24 hour   Intake --   Output 400 ml   Net -400 ml       Physical Exam  Vitals and nursing note reviewed.   Constitutional:       General: He is not in acute distress.     Appearance: He is well-developed. He is ill-appearing.   HENT:      Head: Normocephalic and atraumatic.     Eyes:      Conjunctiva/sclera: Conjunctivae normal.       Cardiovascular:      Rate and Rhythm: Normal rate. Rhythm irregular.      Pulses: Normal pulses.      Heart sounds: Normal heart sounds. No murmur heard.  Pulmonary:      Effort: Pulmonary effort is normal. No respiratory distress.      Breath sounds: Normal breath sounds.   Abdominal:      Palpations: Abdomen is soft.      Tenderness: There is no abdominal tenderness.     Musculoskeletal:         General: No swelling.      Cervical back: Neck supple.     Skin:     General: Skin is warm.      Capillary Refill: Capillary refill takes less than 2 seconds.      Comments: Multiple bruises  on BL UE     Neurological:      Mental Status: He is alert.     Psychiatric:         Mood and Affect: Mood normal.       Lines/Drains:              Lab Results: I have reviewed the following results:   Results from last 7 days   Lab Units 05/25/25  0607 05/24/25  1614 05/21/25  0159   WBC Thousand/uL 7.96   < > 4.21*   HEMOGLOBIN g/dL 9.4*   < > 10.9*   HEMATOCRIT % 29.2*   < > 34.0*   PLATELETS Thousands/uL 97*   < > 187   BANDS PCT %  --   --  1   SEGS PCT % 79*   < >  --    LYMPHO PCT % 12*   < > 41   MONO PCT % 7   < > 8   EOS PCT % 1   < > 3    < > = values in this interval not displayed.     Results from last 7 days   Lab Units 05/25/25  0607   SODIUM mmol/L 141   POTASSIUM mmol/L 3.8   CHLORIDE mmol/L 101   CO2 mmol/L 24   BUN mg/dL 17   CREATININE mg/dL 0.90   ANION GAP mmol/L 16*   CALCIUM mg/dL 7.5*   ALBUMIN g/dL 3.2*   TOTAL BILIRUBIN mg/dL 0.68   ALK PHOS U/L 145*   ALT U/L 39   AST U/L 63*   GLUCOSE RANDOM mg/dL 82         Results from last 7 days   Lab Units 05/26/25  0707 05/25/25  1959 05/25/25  1535 05/25/25  1113 05/25/25  0720 05/24/25  2221 05/24/25  1603   POC GLUCOSE mg/dl 92 173* 142* 137 88 112 68         Results from last 7 days   Lab Units 05/25/25  0607 05/24/25  1614   PROCALCITONIN ng/ml 0.09 0.09       Recent Cultures (last 7 days):               Last 24 Hours Medication List:     Current Facility-Administered Medications:     acetaminophen (TYLENOL) tablet 650 mg, Q6H PRN    albuterol (PROVENTIL HFA,VENTOLIN HFA) inhaler 2 puff, Q4H PRN    aluminum-magnesium hydroxide-simethicone (MAALOX) oral suspension 30 mL, Q4H PRN    apixaban (ELIQUIS) tablet 5 mg, BID    aspirin chewable tablet 81 mg, Daily    atorvastatin (LIPITOR) tablet 40 mg, Daily    calcium carbonate (TUMS) chewable tablet 1,000 mg, Daily    diltiazem (CARDIZEM SR) 12 hr capsule 120 mg, BID    ferrous sulfate tablet 325 mg, Daily With Breakfast    fluticasone-vilanterol 200-25 mcg/actuation 1 puff, Daily    folic acid  (FOLVITE) tablet 1 mg, Daily    gabapentin (NEURONTIN) capsule 300 mg, TID    insulin lispro (HumALOG/ADMELOG) 100 units/mL subcutaneous injection 1-5 Units, TID With Meals **AND** Fingerstick Glucose (POCT), 4x Daily AC and at bedtime    insulin lispro (HumALOG/ADMELOG) 100 units/mL subcutaneous injection 1-5 Units, HS    lactated ringers infusion, Continuous, Last Rate: 50 mL/hr (05/26/25 0005)    levalbuterol (XOPENEX) inhalation solution 0.63 mg, Q8H PRN    magnesium Oxide (MAG-OX) tablet 800 mg, BID    metoprolol succinate (TOPROL-XL) 24 hr tablet 150 mg, Q12H SEDA    multivitamin stress formula tablet 1 tablet, Daily    nicotine (NICODERM CQ) 21 mg/24 hr TD 24 hr patch 1 patch, Daily    pantoprazole (PROTONIX) EC tablet 40 mg, Daily    pyridoxine (VITAMIN B6) tablet 100 mg, Daily    ranolazine (RANEXA) 12 hr tablet 500 mg, BID    saccharomyces boulardii (FLORASTOR) capsule 250 mg, BID    tamsulosin (FLOMAX) capsule 0.4 mg, Daily With Dinner    thiamine tablet 100 mg, Daily    vancomycin (VANCOCIN) capsule 125 mg, Q6H SEDA    Administrative Statements   Today, Patient Was Seen By: Rich Finn MD      **Please Note: This note may have been constructed using a voice recognition system.**

## 2025-05-26 NOTE — OCCUPATIONAL THERAPY NOTE
"  OT EVALUATION       05/26/25 6296   OT Last Visit   OT Visit Date 05/26/25   Note Type   Note type Evaluation   Pain Assessment   Pain Assessment Tool 0-10   Pain Score No Pain   Restrictions/Precautions   Other Precautions Contact/isolation;Cognitive;Chair Alarm;Bed Alarm;Fall Risk   Home Living   Type of Home Apartment  ([facility], 5th floor)   Home Layout One level;Elevator   Bathroom Shower/Tub Tub/shower unit   Bathroom Toilet Standard   Bathroom Equipment Grab bars in shower;Grab bars around toilet   Bathroom Accessibility Accessible   Home Equipment Walker;Cane  (rollator, O@)   Additional Comments Pt ambulating with rollator walker PTA with frequent falls due to ETOH abuse.   Prior Function   Level of [place] Independent with ADLs;Independent with functional mobility;Needs assistance with IADLS   Lives With Alone   Receives Help From Family;Friend(s)   IADLs Family/Friend/Other provides transportation;Independent with meal prep;Independent with medication management   Falls in the last 6 months >10   Vocational Retired  (construction)   Subjective   Subjective \"I'm feeling better.\"   ADL   Where Assessed Chair   Eating Assistance 7  Independent   Grooming Assistance 5  Supervision/Setup   Grooming Deficit Setup;Verbal cueing;Supervision/safety;Increased time to complete   UB Bathing Assistance 4  Minimal Assistance   UB Bathing Deficit Setup;Supervision/safety;Increased time to complete   LB Bathing Assistance 4  Minimal Assistance   LB Bathing Deficit Setup;Steadying;Verbal cueing;Supervision/safety;Increased time to complete   UB Dressing Assistance 5  Supervision/Setup   UB Dressing Deficit Setup;Supervision/safety   LB Dressing Assistance 4  Minimal Assistance   LB Dressing Deficit Setup;Requires assistive device for steadying;Verbal cueing;Supervision/safety;Increased time to complete   Toileting Assistance  4  Minimal Assistance   Toileting Deficit Verbal cueing;Supervison/safety;Grab " bar use;Steadying   Bed Mobility   Supine to Sit 5  Supervision   Additional items Verbal cues   Sit to Supine 5  Supervision   Additional items Verbal cues   Transfers   Sit to Stand 5  Supervision   Stand to Sit 5  Supervision   Stand pivot 5  Supervision   Additional items Verbal cues  (with RW)   Balance   Static Sitting Fair +   Dynamic Sitting Fair +   Static Standing Fair   Dynamic Standing Fair -   Ambulatory Fair -   Activity Tolerance   Activity Tolerance Patient limited by fatigue   RUE Assessment   RUE Assessment WFL   LUE Assessment   LUE Assessment WFL   Hand Function   Gross Motor Coordination Functional   Fine Motor Coordination Impaired  (mild tremors)   Sensation   Light Touch No apparent deficits   Vision-Basic Assessment   Current Vision Wears glasses only for reading   Cognition   Overall Cognitive Status Impaired   Arousal/Participation Cooperative   Attention Attends with cues to redirect   Orientation Level Oriented to person;Oriented to place;Disoriented to time;Disoriented to situation   Memory Decreased recall of recent events;Decreased short term memory   Following Commands Follows multistep commands with increased time or repetition   Comments decreased safety awareness, increased time to process information   Assessment   Limitation Decreased ADL status;Decreased Safe judgement during ADL;Decreased cognition;Decreased endurance;Decreased fine motor control;Decreased self-care trans;Decreased high-level ADLs;Mood limitation  (decreased balance and mobility)   Prognosis Good   Assessment Patient evaluated by Occupational Therapy.  Patient admitted with Alcohol withdrawal (HCC).  The patients occupational profile, medical and therapy history includes a extensive additional review of physical, cognitive, or psychosocial history related to current functional performance.  Comorbidities affecting functional mobility and ADLS include: alcohol abuse with recurrent admissions for alcohol  "withdrawal, frequent falls, diabetes, CHF, CAD, atrial fibrillation, dyslipidemia, GERD, CABG, prostate cancer, C-dif.  Prior to admission, patient was independent with functional mobility with rollator walker, independent with ADLS, and requiring assist for IADLS, lives alone in a 5th floor apt with elevator.  The evaluation identifies the following performance deficits: weakness, impaired balance, decreased endurance, decreased coordination, increased fall risk, new onset of impairment of functional mobility, decreased ADLS, decreased IADLS, decreased activity tolerance, decreased safety awareness, impaired judgement, decreased cognition, and impaired psychosocial skills, that result in activity limitations and/or participation restrictions. This evaluation requires clinical decision making of high complexity, because the patient presents with comorbidites that affect occupational performance and required significant modification of tasks or assistance with consideration of multiple treatment options.  The Barthel Index was used as a functional outcome tool presenting with a score of Barthel Index Score: 65, indicating moderate limitations of functional mobility and ADLS.  The patient's raw score on the AM-PAC Daily Activity Inpatient Short Form is 20. A raw score of greater than or equal to 19 suggests the patient may benefit from discharge to home. Please refer to the recommendation of the Occupational Therapist for safe discharge planning.  Patient will benefit from skilled Occupational Therapy services to address above deficits and facilitate a safe return to prior level of function.   Goals   Patient Goals \"get on a treadmill and exercise again\"   STG Time Frame   (1-7)   Short Term Goal #1 Grooming: independent standing at sink; Bathing: supervision; Upper Body Dressing independent; Lower Body Dressing: supervision; Toileting: supervision; Patient will increase standing tolerance to 5 minutes during ADL task " to decrease assistance level and decrease fall risk; Patient will increase functional mobility to and from bathroom with rolling walker independently to increase performance with ADLS and to use a toilet; Patient will tolerate 10 minutes of UE ROM/strengthening to increase general activity tolerance and performance in ADLS/IADLS; Patient will be able to to verbalize understanding and perform energy conservation/proper body mechanics during ADLS and functional mobility at least 75% of the time with minimal cueing to decrease signs of fatigue and increase stamina to return to prior level of function; Patient will increase static/dynamic sitting balance to good to improve the ability to sit at edge of bed or on a chair for ADLS;  Patient will increase dynamic standing balance to fair to improve postural stability and decrease fall risk during standing ADLS and transfers; Patient will orient self x 4 with minimal verbal cues to increase overall awareness and promote safety with ADL/IADL tasks.   LTG Time Frame   (8-14)   Long Term Goal #1 Bathing: independent; Lower Body Dressing: independent; Toileting: independent; Patient will increase standing tolerance to 8 minutes during ADL task to decrease assistance level and decrease fall risk; Patient will tolerate 15 minutes of UE ROM/strengthening to increase general activity tolerance and performance in ADLS/IADLS; Patient will be able to to verbalize understanding and perform energy conservation/proper body mechanics during ADLS and functional mobility at least 90% of the time with no cueing to decrease signs of fatigue and increase stamina to return to prior level of function; Patient will increase static/dynamic standing balance to fair+ to improve postural stability and decrease fall risk during standing ADLS and transfers; Patient will orient self x 4 with no cues to increase overall awareness and promote safety with ADL/IADL tasks; Pt will score >/= 24/24 on AM-PAC  Daily Activity Inpatient scale to promote safe independence with ADLs and functional mobility; Pt will score >/= 100/100 on Barthel Index in order to decrease caregiver assistance needed and increase ability to perform ADLs and functional mobility.   Plan   Treatment Interventions ADL retraining;Functional transfer training;UE strengthening/ROM;Endurance training;Cognitive reorientation;Patient/family training;Equipment evaluation/education;Fine motor coordination activities;Compensatory technique education;Energy conservation;Activityengagement   Goal Expiration Date 06/09/25   OT Frequency 3-5x/wk   Discharge Recommendation   Rehab Resource Intensity Level, OT III (Minimum Resource Intensity)   AM-PAC Daily Activity Inpatient   Lower Body Dressing 3   Bathing 3   Toileting 3   Upper Body Dressing 3   Grooming 4   Eating 4   Daily Activity Raw Score 20   Daily Activity Standardized Score (Calc for Raw Score >=11) 42.03   AM-PAC Applied Cognition Inpatient   Following a Speech/Presentation 3   Understanding Ordinary Conversation 4   Taking Medications 3   Remembering Where Things Are Placed or Put Away 3   Remembering List of 4-5 Errands 2   Taking Care of Complicated Tasks 3   Applied Cognition Raw Score 18   Applied Cognition Standardized Score 38.07   End of Consult   Education Provided Yes   Patient Position at End of Consult Supine;Bed/Chair alarm activated;All needs within reach   Nurse Communication Nurse aware of consult   Licensure   [place] License Number  Ya Estevez MS, OTR/L, [place] Lic# [id number]

## 2025-05-26 NOTE — PLAN OF CARE
Problem: Potential for Falls  Goal: Patient will remain free of falls  Description: INTERVENTIONS:  - Educate patient/family on patient safety including physical limitations  - Instruct patient to call for assistance with activity   - Consider consulting OT/PT to assist with strengthening/mobility based on AM PAC & JH-HLM score  - Consult OT/PT to assist with strengthening/mobility   - Keep Call bell within reach  - Keep bed low and locked with side rails adjusted as appropriate  - Keep care items and personal belongings within reach  - Initiate and maintain comfort rounds  - Make Fall Risk Sign visible to staff  - Offer Toileting every 2 Hours, in advance of need  - Initiate/Maintain bed alarm  - Obtain necessary fall risk management equipment: yellow socks  - Apply yellow socks and bracelet for high fall risk patients  - Consider moving patient to room near nurses station  Outcome: Progressing     Problem: PAIN - ADULT  Goal: Verbalizes/displays adequate comfort level or baseline comfort level  Description: Interventions:  - Encourage patient to monitor pain and request assistance  - Assess pain using appropriate pain scale  - Administer analgesics as ordered based on type and severity of pain and evaluate response  - Implement non-pharmacological measures as appropriate and evaluate response  - Consider cultural and social influences on pain and pain management  - Notify physician/advanced practitioner if interventions unsuccessful or patient reports new pain  - Educate patient/family on pain management process including their role and importance of  reporting pain   - Provide non-pharmacologic/complimentary pain relief interventions  Outcome: Progressing     Problem: INFECTION - ADULT  Goal: Absence or prevention of progression during hospitalization  Description: INTERVENTIONS:  - Assess and monitor for signs and symptoms of infection  - Monitor lab/diagnostic results  - Monitor all insertion sites, i.e.  indwelling lines, tubes, and drains  - Monitor endotracheal if appropriate and nasal secretions for changes in amount and color  - [place] appropriate cooling/warming therapies per order  - Administer medications as ordered  - Instruct and encourage patient and family to use good hand hygiene technique  - Identify and instruct in appropriate isolation precautions for identified infection/condition  Outcome: Progressing  Goal: Absence of fever/infection during neutropenic period  Description: INTERVENTIONS:  - Monitor WBC  - Perform strict hand hygiene  - Limit to healthy visitors only  - No plants, dried, fresh or silk flowers with turpin in patient room  Outcome: Progressing     Problem: SAFETY ADULT  Goal: Patient will remain free of falls  Description: INTERVENTIONS:  - Educate patient/family on patient safety including physical limitations  - Instruct patient to call for assistance with activity   - Consider consulting OT/PT to assist with strengthening/mobility based on AM PAC & -HLM score  - Consult OT/PT to assist with strengthening/mobility   - Keep Call bell within reach  - Keep bed low and locked with side rails adjusted as appropriate  - Keep care items and personal belongings within reach  - Initiate and maintain comfort rounds  - Make Fall Risk Sign visible to staff  - Offer Toileting every 2 Hours, in advance of need  - Initiate/Maintain bed alarm  - Obtain necessary fall risk management equipment: yellow so  - Apply yellow socks and bracelet for high fall risk patients  - Consider moving patient to room near nurses station  Outcome: Progressing  Goal: Maintain or return to baseline ADL function  Description: INTERVENTIONS:  -  Assess patient's ability to carry out ADLs; assess patient's baseline for ADL function and identify physical deficits which impact ability to perform ADLs (bathing, care of mouth/teeth, toileting, grooming, dressing, etc.)  - Assess/evaluate cause of self-care deficits   -  Assess range of motion  - Assess patient's mobility; develop plan if impaired  - Assess patient's need for assistive devices and provide as appropriate  - Encourage maximum independence but intervene and supervise when necessary  - Involve family in performance of ADLs  - Assess for home care needs following discharge   - Consider OT consult to assist with ADL evaluation and planning for discharge  - Provide patient education as appropriate  - Monitor functional capacity and physical performance, use of AM PAC & JH-HLM   - Monitor gait, balance and fatigue with ambulation    Outcome: Progressing  Goal: Maintains/Returns to pre admission functional level  Description: INTERVENTIONS:  - Perform AM-PAC 6 Click Basic Mobility/ Daily Activity assessment daily.  - Set and communicate daily mobility goal to care team and patient/family/caregiver.   - Collaborate with rehabilitation services on mobility goals if consulted  - Perform Range of Motion 3 times a day.  - Reposition patient every 2 hours.  - Dangle patient 3 times a day  - Stand patient 3 times a day  - Ambulate patient 3 times a day  - Out of bed to chair 3 times a day   - Out of bed for meals 3 times a day  - Out of bed for toileting  - Record patient progress and toleration of activity level   Outcome: Progressing     Problem: DISCHARGE PLANNING  Goal: Discharge to home or other facility with appropriate resources  Description: INTERVENTIONS:  - Identify barriers to discharge w/patient and caregiver  - Arrange for needed discharge resources and transportation as appropriate  - Identify discharge learning needs (meds, wound care, etc.)  - Arrange for interpretive services to assist at discharge as needed  - Refer to Case Management Department for coordinating discharge planning if the patient needs post-hospital services based on physician/advanced practitioner order or complex needs related to functional status, cognitive ability, or social support  system  Outcome: Progressing     Problem: Knowledge Deficit  Goal: Patient/family/caregiver demonstrates understanding of disease process, treatment plan, medications, and discharge instructions  Description: Complete learning assessment and assess knowledge base.  Interventions:  - Provide teaching at level of understanding  - Provide teaching via preferred learning methods  Outcome: Progressing     Problem: Nutrition/Hydration-ADULT  Goal: Nutrient/Hydration intake appropriate for improving, restoring or maintaining nutritional needs  Description: Monitor and assess patient's nutrition/hydration status for malnutrition. Collaborate with interdisciplinary team and initiate plan and interventions as ordered.  Monitor patient's weight and dietary intake as ordered or per policy. Utilize nutrition screening tool and intervene as necessary. Determine patient's food preferences and provide high-protein, high-caloric foods as appropriate.     INTERVENTIONS:  - Monitor oral intake, urinary output, labs, and treatment plans  - Assess nutrition and hydration status and recommend course of action  - Evaluate amount of meals eaten  - Assist patient with eating if necessary   - Allow adequate time for meals  - Recommend/ encourage appropriate diets, oral nutritional supplements, and vitamin/mineral supplements  - Order, calculate, and assess calorie counts as needed  - Recommend, monitor, and adjust tube feedings and TPN/PPN based on assessed needs  - Assess need for intravenous fluids  - Provide specific nutrition/hydration education as appropriate  - Include patient/family/caregiver in decisions related to nutrition  Outcome: Progressing

## 2025-05-26 NOTE — NURSING NOTE
Patient stated to this RN and Quinn THOMAS  that he has a bottle of vodka under the bed. RN looked under the bed and didn't see anything. Paolo from security made aware and did a room check.

## 2025-05-27 PROBLEM — E87.29 METABOLIC ACIDOSIS, INCREASED ANION GAP: Status: RESOLVED | Noted: 2021-04-21 | Resolved: 2025-05-27

## 2025-05-27 LAB
ANION GAP SERPL CALCULATED.3IONS-SCNC: 13 MMOL/L (ref 4–13)
ATRIAL RATE: 94 BPM
BASOPHILS # BLD AUTO: 0.02 THOUSANDS/ÂΜL (ref 0–0.1)
BASOPHILS NFR BLD AUTO: 0 % (ref 0–1)
BUN SERPL-MCNC: 15 MG/DL (ref 5–25)
CALCIUM SERPL-MCNC: 8.1 MG/DL (ref 8.4–10.2)
CHLORIDE SERPL-SCNC: 96 MMOL/L (ref 96–108)
CO2 SERPL-SCNC: 24 MMOL/L (ref 21–32)
CREAT SERPL-MCNC: 0.96 MG/DL (ref 0.6–1.3)
EOSINOPHIL # BLD AUTO: 0.13 THOUSAND/ÂΜL (ref 0–0.61)
EOSINOPHIL NFR BLD AUTO: 3 % (ref 0–6)
ERYTHROCYTE [DISTWIDTH] IN BLOOD BY AUTOMATED COUNT: 17.2 % (ref 11.6–15.1)
GFR SERPL CREATININE-BSD FRML MDRD: 83 ML/MIN/1.73SQ M
GLUCOSE SERPL-MCNC: 126 MG/DL (ref 65–140)
GLUCOSE SERPL-MCNC: 136 MG/DL (ref 65–140)
GLUCOSE SERPL-MCNC: 142 MG/DL (ref 65–140)
GLUCOSE SERPL-MCNC: 151 MG/DL (ref 65–140)
GLUCOSE SERPL-MCNC: 156 MG/DL (ref 65–140)
HCT VFR BLD AUTO: 25.8 % (ref 36.5–49.3)
HGB BLD-MCNC: 8.3 G/DL (ref 12–17)
IMM GRANULOCYTES # BLD AUTO: 0.03 THOUSAND/UL (ref 0–0.2)
IMM GRANULOCYTES NFR BLD AUTO: 1 % (ref 0–2)
LYMPHOCYTES # BLD AUTO: 1.06 THOUSANDS/ÂΜL (ref 0.6–4.47)
LYMPHOCYTES NFR BLD AUTO: 23 % (ref 14–44)
MAGNESIUM SERPL-MCNC: 1.3 MG/DL (ref 1.9–2.7)
MCH RBC QN AUTO: 31.2 PG (ref 26.8–34.3)
MCHC RBC AUTO-ENTMCNC: 32.2 G/DL (ref 31.4–37.4)
MCV RBC AUTO: 97 FL (ref 82–98)
MONOCYTES # BLD AUTO: 0.55 THOUSAND/ÂΜL (ref 0.17–1.22)
MONOCYTES NFR BLD AUTO: 12 % (ref 4–12)
NEUTROPHILS # BLD AUTO: 2.9 THOUSANDS/ÂΜL (ref 1.85–7.62)
NEUTS SEG NFR BLD AUTO: 61 % (ref 43–75)
NRBC BLD AUTO-RTO: 0 /100 WBCS
PLATELET # BLD AUTO: 53 THOUSANDS/UL (ref 149–390)
PMV BLD AUTO: 10.2 FL (ref 8.9–12.7)
POTASSIUM SERPL-SCNC: 3.7 MMOL/L (ref 3.5–5.3)
QRS AXIS: 96 DEGREES
QRSD INTERVAL: 92 MS
QT INTERVAL: 436 MS
QTC INTERVAL: 483 MS
RBC # BLD AUTO: 2.66 MILLION/UL (ref 3.88–5.62)
SODIUM SERPL-SCNC: 133 MMOL/L (ref 135–147)
T WAVE AXIS: -22 DEGREES
VENTRICULAR RATE: 74 BPM
WBC # BLD AUTO: 4.69 THOUSAND/UL (ref 4.31–10.16)

## 2025-05-27 PROCEDURE — 85025 COMPLETE CBC W/AUTO DIFF WBC: CPT

## 2025-05-27 PROCEDURE — 82948 REAGENT STRIP/BLOOD GLUCOSE: CPT

## 2025-05-27 PROCEDURE — 80048 BASIC METABOLIC PNL TOTAL CA: CPT

## 2025-05-27 PROCEDURE — 83735 ASSAY OF MAGNESIUM: CPT

## 2025-05-27 PROCEDURE — 93010 ELECTROCARDIOGRAM REPORT: CPT | Performed by: INTERNAL MEDICINE

## 2025-05-27 PROCEDURE — 99232 SBSQ HOSP IP/OBS MODERATE 35: CPT | Performed by: FAMILY MEDICINE

## 2025-05-27 RX ORDER — VANCOMYCIN HYDROCHLORIDE 125 MG/1
125 CAPSULE ORAL EVERY 6 HOURS SCHEDULED
Qty: 44 CAPSULE | Refills: 0 | Status: CANCELLED | OUTPATIENT
Start: 2025-05-28 | End: 2025-06-08

## 2025-05-27 RX ORDER — MAGNESIUM SULFATE HEPTAHYDRATE 40 MG/ML
2 INJECTION, SOLUTION INTRAVENOUS ONCE
Status: DISCONTINUED | OUTPATIENT
Start: 2025-05-27 | End: 2025-05-27

## 2025-05-27 RX ORDER — MAGNESIUM SULFATE HEPTAHYDRATE 40 MG/ML
4 INJECTION, SOLUTION INTRAVENOUS ONCE
Status: COMPLETED | OUTPATIENT
Start: 2025-05-27 | End: 2025-05-27

## 2025-05-27 RX ADMIN — B-COMPLEX W/ C & FOLIC ACID TAB 1 TABLET: TAB at 09:29

## 2025-05-27 RX ADMIN — INSULIN LISPRO 1 UNITS: 100 INJECTION, SOLUTION INTRAVENOUS; SUBCUTANEOUS at 12:32

## 2025-05-27 RX ADMIN — PYRIDOXINE HCL TAB 50 MG 100 MG: 50 TAB at 09:30

## 2025-05-27 RX ADMIN — THIAMINE HCL TAB 100 MG 100 MG: 100 TAB at 09:30

## 2025-05-27 RX ADMIN — NICOTINE 1 PATCH: 21 PATCH, EXTENDED RELEASE TRANSDERMAL at 09:29

## 2025-05-27 RX ADMIN — ANTACID TABLETS 1000 MG: 500 TABLET, CHEWABLE ORAL at 09:29

## 2025-05-27 RX ADMIN — FLUTICASONE FUROATE AND VILANTEROL TRIFENATATE 1 PUFF: 200; 25 POWDER RESPIRATORY (INHALATION) at 09:37

## 2025-05-27 RX ADMIN — INSULIN LISPRO 1 UNITS: 100 INJECTION, SOLUTION INTRAVENOUS; SUBCUTANEOUS at 15:32

## 2025-05-27 RX ADMIN — VANCOMYCIN HYDROCHLORIDE 125 MG: 125 CAPSULE ORAL at 17:52

## 2025-05-27 RX ADMIN — GABAPENTIN 300 MG: 300 CAPSULE ORAL at 15:23

## 2025-05-27 RX ADMIN — GABAPENTIN 300 MG: 300 CAPSULE ORAL at 09:29

## 2025-05-27 RX ADMIN — VANCOMYCIN HYDROCHLORIDE 125 MG: 125 CAPSULE ORAL at 12:33

## 2025-05-27 RX ADMIN — GABAPENTIN 300 MG: 300 CAPSULE ORAL at 21:31

## 2025-05-27 RX ADMIN — METOPROLOL SUCCINATE 150 MG: 100 TABLET, EXTENDED RELEASE ORAL at 21:33

## 2025-05-27 RX ADMIN — Medication 800 MG: at 17:52

## 2025-05-27 RX ADMIN — VANCOMYCIN HYDROCHLORIDE 125 MG: 125 CAPSULE ORAL at 05:29

## 2025-05-27 RX ADMIN — METOPROLOL SUCCINATE 150 MG: 100 TABLET, EXTENDED RELEASE ORAL at 09:29

## 2025-05-27 RX ADMIN — DILTIAZEM HYDROCHLORIDE 120 MG: 60 CAPSULE, EXTENDED RELEASE ORAL at 09:37

## 2025-05-27 RX ADMIN — Medication 250 MG: at 09:29

## 2025-05-27 RX ADMIN — FOLIC ACID 1 MG: 1 TABLET ORAL at 09:30

## 2025-05-27 RX ADMIN — TAMSULOSIN HYDROCHLORIDE 0.4 MG: 0.4 CAPSULE ORAL at 15:32

## 2025-05-27 RX ADMIN — RANOLAZINE 500 MG: 500 TABLET, FILM COATED, EXTENDED RELEASE ORAL at 09:30

## 2025-05-27 RX ADMIN — Medication 250 MG: at 17:52

## 2025-05-27 RX ADMIN — Medication 800 MG: at 09:29

## 2025-05-27 RX ADMIN — Medication 3 MG: at 21:31

## 2025-05-27 RX ADMIN — ATORVASTATIN CALCIUM 40 MG: 40 TABLET, FILM COATED ORAL at 09:30

## 2025-05-27 RX ADMIN — ASPIRIN 81 MG: 81 TABLET, CHEWABLE ORAL at 09:29

## 2025-05-27 RX ADMIN — APIXABAN 5 MG: 5 TABLET, FILM COATED ORAL at 09:30

## 2025-05-27 RX ADMIN — FERROUS SULFATE TAB 325 MG (65 MG ELEMENTAL FE) 325 MG: 325 (65 FE) TAB at 07:36

## 2025-05-27 RX ADMIN — RANOLAZINE 500 MG: 500 TABLET, FILM COATED, EXTENDED RELEASE ORAL at 17:52

## 2025-05-27 RX ADMIN — APIXABAN 5 MG: 5 TABLET, FILM COATED ORAL at 17:52

## 2025-05-27 RX ADMIN — MAGNESIUM SULFATE HEPTAHYDRATE 4 G: 40 INJECTION, SOLUTION INTRAVENOUS at 07:36

## 2025-05-27 RX ADMIN — PANTOPRAZOLE SODIUM 40 MG: 40 TABLET, DELAYED RELEASE ORAL at 09:30

## 2025-05-27 RX ADMIN — VANCOMYCIN HYDROCHLORIDE 125 MG: 125 CAPSULE ORAL at 23:17

## 2025-05-27 NOTE — ASSESSMENT & PLAN NOTE
>>ASSESSMENT AND PLAN FOR TYPE 2 DIABETES MELLITUS WITH CHRONIC KIDNEY DISEASE (HCC) WRITTEN ON 5/28/2025 12:06 PM BY PIYUSH PEREZ DO    Lab Results   Component Value Date    HGBA1C 5.2 05/05/2025       Recent Labs     05/26/25  1104 05/26/25  1537 05/26/25  2102 05/27/25  0724   POCGLU 81 135 128 136       Blood Sugar Average: Last 72 hrs:  (P) [id number]  Chronic, well-controlled  Medications include metformin 500 mg twice daily  Patient noting that he had not been taking his medications or eating regularly for several days  Hypoglycemia noted on admission, improving with p.o. intake and IVF with D5    Held home metformin  S/p Insulin sliding scale  S/p Carb controlled diet  S/p Hypoglycemia protocol    Continue home metformin

## 2025-05-27 NOTE — ASSESSMENT & PLAN NOTE
Chronic -history of alcohol abuse with recurrent admissions for alcohol withdrawal  Stating last drink was on a.m. of presentation  Noting some fine tremors on examination especially when trying to complete task like eating. Endorsing that he has not been taking his medications including naltrexone or vitamins for at least a week.  Additionally noting that he has not been eating regularly for several days    ED course: 1 g Rocephin, 10 mg Valium IV, 2 g mag sulfate, D5 25 g and 1 L normal saline bolus, DuoNeb x 1  Folate wnl  B12 wnl      S/p CIWA protocol  S/p Case management consulted, appreciate reccs  S/p Continue thiamine, folic acid, B6, multivitamin supplementation  S/p Seizure precautions, Aspiration precautions, Fall precautions  S/p Telemetry

## 2025-05-27 NOTE — ASSESSMENT & PLAN NOTE
Presented to ED with hypoxia 84 to 88% on room air  Requiring 2 to 4 L nasal cannula on admission  Tolerating titration down on O2  Denying cough or feeling short of breath  History of COPD, noncompliant with home medications    ED course: Rocephin x 1, azithromycin times  CXR: No acute cardiopulmonary disease.     Patient on RA, satting fine.    Titrated O2 to keep SPO2 greater than 88%  Wean off O2 as able   Resumed home inhalers  Incentive spirometry  Rest of plan per SIRS

## 2025-05-27 NOTE — ASSESSMENT & PLAN NOTE
>>ASSESSMENT AND PLAN FOR ABNORMAL LFTS WRITTEN ON 5/28/2025 12:06 PM BY PIYUSH PEREZ,     AST 99, ALT 47, alk phos 155

## 2025-05-27 NOTE — ASSESSMENT & PLAN NOTE
Previous history of recurrent C. Difficile  Started on p.o. vancomycin taper during previous hospitalization on 5/8. Patient reporting that he had not been compliant with vancomycin taper  Cdif toxin by PCR: (+)  Cdif toxins A+B: (+)    Resumed during hospitalization  Dc'ed that   Continue PO Vanc 125 mg Q6   Strict contact and hand hygiene  Monitor stool output

## 2025-05-27 NOTE — ASSESSMENT & PLAN NOTE
Wt Readings from Last 3 Encounters:   05/27/25 74 kg (163 lb 2.3 oz)   05/13/25 79.1 kg (174 lb 4.8 oz)   05/08/25 86 kg (189 lb 9.5 oz)     Chronic - appears euvolemic on admission  Echo (2/2025): EF 50 to 54%, wall motion could not be accurately assessed.  Indeterminate diastolic function.  RV cavity normal with reduced systolic function and abnormal tricuspid annular plane systolic excursion less than 1.7 cm.  LA severely dilated.  RA pressure estimated greater than 15 mmHg.  Estimated pulmonary artery systolic pressure is 63.9 mmHg.    S/p gentle IVF

## 2025-05-27 NOTE — ASSESSMENT & PLAN NOTE
Wt Readings from Last 3 Encounters:   05/27/25 74 kg (163 lb 2.3 oz)   05/13/25 79.1 kg (174 lb 4.8 oz)   05/08/25 86 kg (189 lb 9.5 oz)     Chronic - appears euvolemic on admission  Echo (2/2025): EF 50 to 54%, wall motion could not be accurately assessed.  Indeterminate diastolic function.  RV cavity normal with reduced systolic function and abnormal tricuspid annular plane systolic excursion less than 1.7 cm.  LA severely dilated.  RA pressure estimated greater than 15 mmHg.  Estimated pulmonary artery systolic pressure is 63.9 mmHg.    Plan:  I/Os  Low sodium diet  Daily weights   Gentle IVF

## 2025-05-27 NOTE — ASSESSMENT & PLAN NOTE
Likely starvation ketosis as patient reportedly not been eating regularly  CO2 24 on admission, AG 18  UA with ketones  Creatinine 1.39 on admission    Plan:  S/p gentle IVF  Monitor BMP

## 2025-05-27 NOTE — ASSESSMENT & PLAN NOTE
Mag 1.3 on admission  Monitored and replete electrolytes as indicated    Discharged with magnesium gluconate tablets  Follow-up with PCP outpatient

## 2025-05-27 NOTE — ASSESSMENT & PLAN NOTE
>>ASSESSMENT AND PLAN FOR TYPE 2 DIABETES MELLITUS WITH CHRONIC KIDNEY DISEASE (HCC) WRITTEN ON 5/27/2025  3:14 PM BY PIYUSH PEREZ DO    Lab Results   Component Value Date    HGBA1C 5.2 05/05/2025       Recent Labs     05/26/25  1537 05/26/25  2102 05/27/25  0724 05/27/25  1142   POCGLU 135 128 136 151*       Blood Sugar Average: Last 72 hrs:  (P) 120.25  Chronic, well-controlled  Medications include metformin 500 mg twice daily  Patient noting that he had not been taking his medications or eating regularly for several days  Hypoglycemia noted on admission, improving with p.o. intake and IVF with D5    Plan:  Hold home metformin  Insulin sliding scale  Carb controlled diet  Hypoglycemia protocol

## 2025-05-27 NOTE — ASSESSMENT & PLAN NOTE
>>ASSESSMENT AND PLAN FOR COPD, SEVERITY TO BE DETERMINED (HCC) WRITTEN ON 5/28/2025 12:06 PM BY PIYUSH PEREZ, DO    Chronic, not following with pulmonology  Home inhalers include albuterol and Breo  Patient noting that he had not been compliant with home medications or inhalers   Decreased breath sounds bilaterally with intermittent wheezing at bases    Resumed home inhalers  S/p PRN Xopenex in setting of afib     Continue home inhalers

## 2025-05-27 NOTE — ASSESSMENT & PLAN NOTE
Lab Results   Component Value Date    HGBA1C 5.2 05/05/2025       Recent Labs     05/26/25  1104 05/26/25  1537 05/26/25  2102 05/27/25  0724   POCGLU 81 135 128 136       Blood Sugar Average: Last 72 hrs:  (P) [id number]  Chronic, well-controlled  Medications include metformin 500 mg twice daily  Patient noting that he had not been taking his medications or eating regularly for several days  Hypoglycemia noted on admission, improving with p.o. intake and IVF with D5    Held home metformin  S/p Insulin sliding scale  S/p Carb controlled diet  S/p Hypoglycemia protocol    Continue home metformin

## 2025-05-27 NOTE — PROGRESS NOTES
Progress Note - Family Medicine   Name: Gregg Partida 65 y.o. male I MRN: [id number]  Unit/Bed#: [address] [place] [address] I Date of Admission: 2025   Date of Service: 2025 I Hospital Day: 2    Assessment & Plan  Alcohol withdrawal (HCC)  Chronic -history of alcohol abuse with recurrent admissions for alcohol withdrawal  Stating last drink was on a.m. of presentation  Noting some fine tremors on examination especially when trying to complete task like eating. Endorsing that he has not been taking his medications including naltrexone or vitamins for at least a week.  Additionally noting that he has not been eating regularly for several days    ED course: 1 g Rocephin, 10 mg Valium IV, 2 g mag sulfate, D5 25 g and 1 L normal saline bolus, DuoNeb x 1  B12 wnl  Folate wnl     Patient interested in outpatient Alcohol  rehab  CIWA score 11     Plan:  CIWA protocol  Case management consulted, appreciate reccs  Recommend home vs. Outpatient rehab  Continue thiamine, folic acid, B6, multivitamin supplementation  Seizure precautions, Aspiration precautions, Fall precautions  Monitor mentation  Telemetry   SIRS (systemic inflammatory response syndrome) (HCC)  As evidenced by tachycardia, tachypnea -query source of infection  WBC WNL, Pro-Vlad WNL x 1  UA: Light orange, high specific gravity, protein, ketones, 0 below tension, bilirubin, occult blood  History of recurrent admissions for similar concerns  Last CXR with pneumonitis versus pneumonia in right lobe  ED course: Azithromycin x 1, Rocephin x 1  Previously given vancomycin p.o. for C. Difficile, patient endorses that he had not been taking  Denies current diarrhea  IVF with D5  CXR: No acute cardiopulmonary disease.   Proca.09 < 0.09    Plan:    Hold off Abx  If fever, or constitutional sxs., consider restarting  Recurrent Clostridioides difficile diarrhea  Previous history of recurrent C. Difficile  Started on p.o. vancomycin taper during previous hospitalization  on 5/8. Patient reporting that he had not been compliant with vancomycin taper  Cdif toxin by PCR: (+)  Cdif toxins A+B: (+)    Resumed during hospitalization  Dc'ed that   Continue PO Vanc 125 mg Q6   Strict contact and hand hygiene  Monitor stool output   Acute respiratory failure with hypoxia (HCC)  Presented to ED with hypoxia 84 to 88% on room air  Requiring 2 to 4 L nasal cannula on admission  Tolerating titration down on O2  Denying cough or feeling short of breath  History of COPD, noncompliant with home medications    ED course: Rocephin x 1, azithromycin times  CXR: No acute cardiopulmonary disease.     Tolerating RA    Plan:  Titrate O2 to keep SPO2 greater than 88%  Weaned off O2   Resume home inhalers  Incentive spirometry  Rest of plan per SIRS  Acute kidney injury superimposed on chronic kidney disease  (HCC)  Creatinine 1.39 on admission, baseline Cr 0.8-1  Patient reporting that he had not been eating for several days, presented with EtOH withdrawal  UA: 1+ ketones, small blood, 2+ protein, small bili, 4.0 urobilinogen, 2-4 hyaline casts, 0-3 granular casts     Labs: Cr 1.13 > 0.90    Plan:  S/p gentle IV rehydration w/ 50 mL/hr IV LR  Monitor I's and O's  Urinary retention protocol  Ordered PVR  Continue home Flomax  Increased anion gap (Resolved: 5/27/2025)  Likely starvation ketosis as patient reportedly not been eating regularly  CO2 24 on admission, AG 18  UA with ketones  Creatinine 1.39 on admission    Plan:  S/p gentle IVF  Monitor BMP  COPD, severity to be determined (HCC)  Chronic, not following with pulmonology  Home inhalers include albuterol and Breo  Patient noting that he had not been compliant with home medications or inhalers   Decreased breath sounds bilaterally with intermittent wheezing at bases    Plan:  Resume home inhalers  PRN Xopenex in setting of afib   Type 2 diabetes mellitus with chronic kidney disease (HCC)  Lab Results   Component Value Date    HGBA1C 5.2 05/05/2025        Recent Labs     05/26/25  1537 05/26/25  2102 05/27/25  0724 05/27/25  1142   POCGLU 135 128 136 151*       Blood Sugar Average: Last 72 hrs:  (P) 120.25  Chronic, well-controlled  Medications include metformin 500 mg twice daily  Patient noting that he had not been taking his medications or eating regularly for several days  Hypoglycemia noted on admission, improving with p.o. intake and IVF with D5    Plan:  Hold home metformin  Insulin sliding scale  Carb controlled diet  Hypoglycemia protocol  Dyslipidemia  Chronic, last lipid panel (11/24): Total cholesterol 153, , LDL 87, HDL 46.  Home regimen: Atorvastatin 40 mg.    Continue home Lipitor  History of prostate cancer  Stable, history of prostate cancer in 2020 s/p resection 2021.  Home regimen: Flomax 0.4 mg daily  Noting that he had not been taking his home medications    Continue home regimen  Monitor I's and O's  CAD (coronary artery disease)  H/O CAD s/p CABG.  Home regimen: ASA 81 mg, ranolazine 500 mg every 12 hours  Noting that he not been taking his medications    Continue home regimen  Nicotine abuse  Chronic, stable.  Continues to smoke daily.    Ordered nicotine 21 mg patch  Encourage smoking cessation  Atrial fibrillation (HCC)  Chronic, noted in ED on EKG  Rate on admission less than 100  Medications include Eliquis 5 mg twice daily, ASA 81 mg daily, Cardizem 100 mg twice daily, Toprol  mg  Noting that he not take his medications at home    Patient in Afib on Tele. HR avg 80s.     Plan:  Resume home medications  Monitor on telemetry  Monitor rate control  Chronic heart failure with preserved ejection fraction (HCC)  Wt Readings from Last 3 Encounters:   05/27/25 74 kg (163 lb 2.3 oz)   05/13/25 79.1 kg (174 lb 4.8 oz)   05/08/25 86 kg (189 lb 9.5 oz)     Chronic - appears euvolemic on admission  Echo (2/2025): EF 50 to 54%, wall motion could not be accurately assessed.  Indeterminate diastolic function.  RV cavity normal with  reduced systolic function and abnormal tricuspid annular plane systolic excursion less than 1.7 cm.  LA severely dilated.  RA pressure estimated greater than 15 mmHg.  Estimated pulmonary artery systolic pressure is 63.9 mmHg.    Plan:  I/Os  Low sodium diet  Daily weights   Gentle IVF   QT prolongation  On admission EKG QT C interval 483  Admission mag 1.3    Plan:  Monitoring on telemetry  Replete electrolytes as indicated   Avoid QT prolonging medications  Ambulatory dysfunction  Chronic, noting multiple falls at home  Patient reporting that he lives alone. History of alcohol abuse    PT OT ordered  Fall precautions  GERD (gastroesophageal reflux disease)  Chronic, stable.  Home regimen: Protonix 40 mg, Maalox 30 mL every 4 as needed, aluminum magnesium hydroxide 5 mL every 6 hours as needed.    Resume home medication   Hypertension  Chronic has a history of hypo and hypertension  Presented to ED with hypotension    Status post IVF, BPs generally normalized  Continue to monitor  Electrolyte abnormality  Mag 1.3 on admission  Monitor and replete electrolytes as indicated  Abnormal LFTs  AST 99, ALT 47, alk phos 155  Continue to monitor    VTE Pharmacologic Prophylaxis: VTE Score: 7 High Risk (Score >/= 5) - Pharmacological DVT Prophylaxis Ordered: apixaban (Eliquis). Sequential Compression Devices Ordered.    Mobility:   Basic Mobility Inpatient Raw Score: 18  JH-HLM Goal: 6: Walk 10 steps or more  JH-HLM Achieved: 2: Bed activities/Dependent transfer  JH-HLM Goal NOT achieved. Continue with multidisciplinary rounding and encourage appropriate mobility to improve upon JH-HLM goals.    Patient Centered Rounds: I performed bedside rounds with nursing staff today.   Discussions with Specialists or Other Care Team Provider: None    Education and Discussions with Family / Patient: Patient declined call to .     Current Length of Stay: 2 day(s)  Current Patient Status: Inpatient   Certification Statement:  The patient will continue to require additional inpatient hospital stay due to diarrhea and ativan requirement  Discharge Plan: Anticipate discharge in 24-48 hrs to home.    Code Status: Level 1 - Full Code    Subjective   Patient was evaluated at bedside and found to be stable. Overnight, the patient had no events. Patient states she didn't have diarrhea the past 2 days, but had 2 bouts today.  Patient admits diarrhea  Patient denies fever, chest pain, SOB     Objective :  Temp:  [98 °F (36.7 °C)-99.3 °F (37.4 °C)] 98.1 °F (36.7 °C)  HR:  [] 72  BP: (110-171)/() 110/75  Resp:  [16-20] 20  SpO2:  [92 %-96 %] 95 %  O2 Device: None (Room air)    Body mass index is 20.95 kg/m².     Input and Output Summary (last 24 hours):     Intake/Output Summary (Last 24 hours) at 5/27/2025 1514  Last data filed at 5/27/2025 1237  Gross per 24 hour   Intake --   Output 700 ml   Net -700 ml       Physical Exam  Constitutional:       Appearance: Normal appearance. He is normal weight.   HENT:      Head: Normocephalic and atraumatic.      Nose: Nose normal.      Mouth/Throat:      Mouth: Mucous membranes are moist.      Pharynx: Oropharynx is clear.     Eyes:      Conjunctiva/sclera: Conjunctivae normal.      Pupils: Pupils are equal, round, and reactive to light.       Cardiovascular:      Rate and Rhythm: Normal rate and regular rhythm.      Pulses: Normal pulses.      Heart sounds: Normal heart sounds.   Pulmonary:      Effort: Pulmonary effort is normal. No respiratory distress.      Breath sounds: Normal breath sounds.   Abdominal:      General: Abdomen is flat. Bowel sounds are normal.      Palpations: Abdomen is soft.      Tenderness: There is no abdominal tenderness. There is no guarding or rebound.     Musculoskeletal:         General: Normal range of motion.      Cervical back: Normal range of motion and neck supple.     Skin:     General: Skin is warm and dry.     Neurological:      General: No focal deficit  present.      Mental Status: He is alert and oriented to person, place, and time. Mental status is at baseline.     Psychiatric:         Mood and Affect: Mood normal.         Thought Content: Thought content normal.         Judgment: Judgment normal.         Lines/Drains:        Telemetry:  Telemetry Orders (From admission, onward)               24 Hour Telemetry Monitoring  Continuous x 24 Hours (Telem)        Expiring   Question:  Reason for 24 Hour Telemetry  Answer:  Arrhythmias requiring acute medical intervention / PPM or ICD malfunction                     Telemetry Reviewed: Atrial fibrillation. HR averaging 80s  Indication for Continued Telemetry Use: Arrthymias requiring medical therapy               Lab Results: I have reviewed the following results:   Results from last 7 days   Lab Units 05/27/25  0528 05/24/25  1614 05/21/25  0159   WBC Thousand/uL 4.69   < > 4.21*   HEMOGLOBIN g/dL 8.3*   < > 10.9*   HEMATOCRIT % 25.8*   < > 34.0*   PLATELETS Thousands/uL 53*   < > 187   BANDS PCT %  --   --  1   SEGS PCT % 61   < >  --    LYMPHO PCT % 23   < > 41   MONO PCT % 12   < > 8   EOS PCT % 3   < > 3    < > = values in this interval not displayed.     Results from last 7 days   Lab Units 05/27/25  0528 05/26/25  1840 05/25/25  0607   SODIUM mmol/L 133*   < > 141   POTASSIUM mmol/L 3.7   < > 3.8   CHLORIDE mmol/L 96   < > 101   CO2 mmol/L 24   < > 24   BUN mg/dL 15   < > 17   CREATININE mg/dL 0.96   < > 0.90   ANION GAP mmol/L 13   < > 16*   CALCIUM mg/dL 8.1*   < > 7.5*   ALBUMIN g/dL  --   --  3.2*   TOTAL BILIRUBIN mg/dL  --   --  0.68   ALK PHOS U/L  --   --  145*   ALT U/L  --   --  39   AST U/L  --   --  63*   GLUCOSE RANDOM mg/dL 126   < > 82    < > = values in this interval not displayed.         Results from last 7 days   Lab Units 05/27/25  1142 05/27/25  0724 05/26/25  2102 05/26/25  1537 05/26/25  1104 05/26/25  0707 05/25/25  1959 05/25/25  1535 05/25/25  1113 05/25/25  0720 05/24/25  2228  05/24/25  1603   POC GLUCOSE mg/dl 151* 136 128 135 81 92 173* 142* 137 88 112 68         Results from last 7 days   Lab Units 05/25/25  0607 05/24/25  1614   PROCALCITONIN ng/ml 0.09 0.09       Recent Cultures (last 7 days):   Results from last 7 days   Lab Units 05/25/25  1923   C DIFF TOXIN B BY PCR  Positive*       Imaging Results Review: No pertinent imaging studies reviewed.  Other Study Results Review: No additional pertinent studies reviewed.    Last 24 Hours Medication List:     Current Facility-Administered Medications:     acetaminophen (TYLENOL) tablet 650 mg, Q6H PRN    albuterol (PROVENTIL HFA,VENTOLIN HFA) inhaler 2 puff, Q4H PRN    aluminum-magnesium hydroxide-simethicone (MAALOX) oral suspension 30 mL, Q4H PRN    apixaban (ELIQUIS) tablet 5 mg, BID    aspirin chewable tablet 81 mg, Daily    atorvastatin (LIPITOR) tablet 40 mg, Daily    calcium carbonate (TUMS) chewable tablet 1,000 mg, Daily    diltiazem (CARDIZEM SR) 12 hr capsule 120 mg, BID    ferrous sulfate tablet 325 mg, Daily With Breakfast    fluticasone-vilanterol 200-25 mcg/actuation 1 puff, Daily    folic acid (FOLVITE) tablet 1 mg, Daily    gabapentin (NEURONTIN) capsule 300 mg, TID    insulin lispro (HumALOG/ADMELOG) 100 units/mL subcutaneous injection 1-5 Units, TID With Meals **AND** Fingerstick Glucose (POCT), 4x Daily AC and at bedtime    insulin lispro (HumALOG/ADMELOG) 100 units/mL subcutaneous injection 1-5 Units, HS    lactated ringers infusion, Continuous, Last Rate: 50 mL/hr (05/26/25 1910)    levalbuterol (XOPENEX) inhalation solution 0.63 mg, Q8H PRN    magnesium Oxide (MAG-OX) tablet 800 mg, BID    melatonin tablet 3 mg, HS    metoprolol succinate (TOPROL-XL) 24 hr tablet 150 mg, Q12H SEDA    multivitamin stress formula tablet 1 tablet, Daily    nicotine (NICODERM CQ) 21 mg/24 hr TD 24 hr patch 1 patch, Daily    pantoprazole (PROTONIX) EC tablet 40 mg, Daily    pyridoxine (VITAMIN B6) tablet 100 mg, Daily    ranolazine  (RANEXA) 12 hr tablet 500 mg, BID    saccharomyces boulardii (FLORASTOR) capsule 250 mg, BID    tamsulosin (FLOMAX) capsule 0.4 mg, Daily With Dinner    thiamine tablet 100 mg, Daily    vancomycin (VANCOCIN) capsule 125 mg, Q6H SEDA    Administrative Statements   Today, Patient Was Seen By: Nicky Messer, DO  I have spent a total time of 40 minutes in caring for this patient on the day of the visit/encounter including Diagnostic results, Prognosis, Risks and benefits of tx options, Instructions for management, Patient and family education, Importance of tx compliance, Risk factor reductions, Impressions, Counseling / Coordination of care, Documenting in the medical record, and Reviewing/placing orders in the medical record (including tests, medications, and/or procedures).

## 2025-05-27 NOTE — ASSESSMENT & PLAN NOTE
Chronic -history of alcohol abuse with recurrent admissions for alcohol withdrawal  Stating last drink was on a.m. of presentation  Noting some fine tremors on examination especially when trying to complete task like eating. Endorsing that he has not been taking his medications including naltrexone or vitamins for at least a week.  Additionally noting that he has not been eating regularly for several days    ED course: 1 g Rocephin, 10 mg Valium IV, 2 g mag sulfate, D5 25 g and 1 L normal saline bolus, DuoNeb x 1  B12 wnl  Folate wnl     Patient interested in outpatient Alcohol  rehab  CIWA score 11     Plan:  CIWA protocol  Case management consulted, appreciate reccs  Recommend home vs. Outpatient rehab  Continue thiamine, folic acid, B6, multivitamin supplementation  Seizure precautions, Aspiration precautions, Fall precautions  Monitor mentation  Telemetry

## 2025-05-27 NOTE — ASSESSMENT & PLAN NOTE
Creatinine 1.39 on admission, baseline Cr 0.8-1  Patient reporting that he had not been eating for several days, presented with EtOH withdrawal  UA: 1+ ketones, small blood, 2+ protein, small bili, 4.0 urobilinogen, 2-4 hyaline casts, 0-3 granular casts     Labs: Cr 1.13 > 0.90    S/p gentle IV rehydration w/ 50 mL/hr IV LR  S/p Urinary retention protocol  S/p PVR    Continue home Flomax

## 2025-05-27 NOTE — ASSESSMENT & PLAN NOTE
Creatinine 1.39 on admission, baseline Cr 0.8-1  Patient reporting that he had not been eating for several days, presented with EtOH withdrawal  UA: 1+ ketones, small blood, 2+ protein, small bili, 4.0 urobilinogen, 2-4 hyaline casts, 0-3 granular casts     Labs: Cr 1.13 > 0.90    Plan:  S/p gentle IV rehydration w/ 50 mL/hr IV LR  Monitor I's and O's  Urinary retention protocol  Ordered PVR  Continue home Flomax

## 2025-05-27 NOTE — ASSESSMENT & PLAN NOTE
Likely starvation ketosis as patient reportedly not been eating regularly  CO2 24 on admission, AG 18  UA with ketones  Creatinine 1.39 on admission    S/p gentle IVF

## 2025-05-27 NOTE — PLAN OF CARE
Problem: Potential for Falls  Goal: Patient will remain free of falls  Description: INTERVENTIONS:  - Educate patient/family on patient safety including physical limitations  - Instruct patient to call for assistance with activity   - Consider consulting OT/PT to assist with strengthening/mobility based on AM PAC & JH-HLM score  - Consult OT/PT to assist with strengthening/mobility   - Keep Call bell within reach  - Keep bed low and locked with side rails adjusted as appropriate  - Keep care items and personal belongings within reach  - Initiate and maintain comfort rounds  - Make Fall Risk Sign visible to staff  - Offer Toileting every 2 Hours, in advance of need  - Initiate/Maintain bed/chir alarm  - Obtain necessary fall risk management equipment: bracelet/socks   - Apply yellow socks and bracelet for high fall risk patients  - Consider moving patient to room near nurses station  Outcome: Progressing     Problem: PAIN - ADULT  Goal: Verbalizes/displays adequate comfort level or baseline comfort level  Description: Interventions:  - Encourage patient to monitor pain and request assistance  - Assess pain using appropriate pain scale  - Administer analgesics as ordered based on type and severity of pain and evaluate response  - Implement non-pharmacological measures as appropriate and evaluate response  - Consider cultural and social influences on pain and pain management  - Notify physician/advanced practitioner if interventions unsuccessful or patient reports new pain  - Educate patient/family on pain management process including their role and importance of  reporting pain   - Provide non-pharmacologic/complimentary pain relief interventions  Outcome: Progressing     Problem: INFECTION - ADULT  Goal: Absence or prevention of progression during hospitalization  Description: INTERVENTIONS:  - Assess and monitor for signs and symptoms of infection  - Monitor lab/diagnostic results  - Monitor all insertion sites,  i.e. indwelling lines, tubes, and drains  - Monitor endotracheal if appropriate and nasal secretions for changes in amount and color  - [place] appropriate cooling/warming therapies per order  - Administer medications as ordered  - Instruct and encourage patient and family to use good hand hygiene technique  - Identify and instruct in appropriate isolation precautions for identified infection/condition  Outcome: Progressing  Goal: Absence of fever/infection during neutropenic period  Description: INTERVENTIONS:  - Monitor WBC  - Perform strict hand hygiene  - Limit to healthy visitors only  - No plants, dried, fresh or silk flowers with turpin in patient room  Outcome: Progressing     Problem: SAFETY ADULT  Goal: Patient will remain free of falls  Description: INTERVENTIONS:  - Educate patient/family on patient safety including physical limitations  - Instruct patient to call for assistance with activity   - Consider consulting OT/PT to assist with strengthening/mobility based on AM PAC & -HL score  - Consult OT/PT to assist with strengthening/mobility   - Keep Call bell within reach  - Keep bed low and locked with side rails adjusted as appropriate  - Keep care items and personal belongings within reach  - Initiate and maintain comfort rounds  - Make Fall Risk Sign visible to staff  - Offer Toileting every 2 Hours, in advance of need  - Initiate/Maintain bed/chair alarm  - Obtain necessary fall risk management equipment: bracelet/socks   - Apply yellow socks and bracelet for high fall risk patients  - Consider moving patient to room near nurses station  Outcome: Progressing  Goal: Maintain or return to baseline ADL function  Description: INTERVENTIONS:  -  Assess patient's ability to carry out ADLs; assess patient's baseline for ADL function and identify physical deficits which impact ability to perform ADLs (bathing, care of mouth/teeth, toileting, grooming, dressing, etc.)  - Assess/evaluate cause of self-care  deficits   - Assess range of motion  - Assess patient's mobility; develop plan if impaired  - Assess patient's need for assistive devices and provide as appropriate  - Encourage maximum independence but intervene and supervise when necessary  - Involve family in performance of ADLs  - Assess for home care needs following discharge   - Consider OT consult to assist with ADL evaluation and planning for discharge  - Provide patient education as appropriate  - Monitor functional capacity and physical performance, use of AM PAC & JH-HLM   - Monitor gait, balance and fatigue with ambulation    Outcome: Progressing  Goal: Maintains/Returns to pre admission functional level  Description: INTERVENTIONS:  - Perform AM-PAC 6 Click Basic Mobility/ Daily Activity assessment daily.  - Set and communicate daily mobility goal to care team and patient/family/caregiver.   - Collaborate with rehabilitation services on mobility goals if consulted  - Perform Range of Motion 12 times a day.  - Reposition patient every 2 hours.  - Dangle patient 3 times a day  - Stand patient 3 times a day  - Ambulate patient 3 times a day  - Out of bed to chair 3 times a day   - Out of bed for meals 3 times a day  - Out of bed for toileting  - Record patient progress and toleration of activity level   Outcome: Progressing     Problem: DISCHARGE PLANNING  Goal: Discharge to home or other facility with appropriate resources  Description: INTERVENTIONS:  - Identify barriers to discharge w/patient and caregiver  - Arrange for needed discharge resources and transportation as appropriate  - Identify discharge learning needs (meds, wound care, etc.)  - Arrange for interpretive services to assist at discharge as needed  - Refer to Case Management Department for coordinating discharge planning if the patient needs post-hospital services based on physician/advanced practitioner order or complex needs related to functional status, cognitive ability, or social  support system  Outcome: Progressing     Problem: Knowledge Deficit  Goal: Patient/family/caregiver demonstrates understanding of disease process, treatment plan, medications, and discharge instructions  Description: Complete learning assessment and assess knowledge base.  Interventions:  - Provide teaching at level of understanding  - Provide teaching via preferred learning methods  Outcome: Progressing     Problem: Nutrition/Hydration-ADULT  Goal: Nutrient/Hydration intake appropriate for improving, restoring or maintaining nutritional needs  Description: Monitor and assess patient's nutrition/hydration status for malnutrition. Collaborate with interdisciplinary team and initiate plan and interventions as ordered.  Monitor patient's weight and dietary intake as ordered or per policy. Utilize nutrition screening tool and intervene as necessary. Determine patient's food preferences and provide high-protein, high-caloric foods as appropriate.     INTERVENTIONS:  - Monitor oral intake, urinary output, labs, and treatment plans  - Assess nutrition and hydration status and recommend course of action  - Evaluate amount of meals eaten  - Assist patient with eating if necessary   - Allow adequate time for meals  - Recommend/ encourage appropriate diets, oral nutritional supplements, and vitamin/mineral supplements  - Order, calculate, and assess calorie counts as needed  - Recommend, monitor, and adjust tube feedings and TPN/PPN based on assessed needs  - Assess need for intravenous fluids  - Provide specific nutrition/hydration education as appropriate  - Include patient/family/caregiver in decisions related to nutrition  Outcome: Progressing      Problem: RESPIRATORY - ADULT  Goal: Achieves optimal ventilation and oxygenation  Description: INTERVENTIONS:  - Assess for changes in respiratory status  - Assess for changes in mentation and behavior  - Position to facilitate oxygenation and minimize respiratory effort  -  Oxygen administered by appropriate delivery if ordered  - Initiate smoking cessation education as indicated  - Encourage broncho-pulmonary hygiene including cough, deep breathe, Incentive Spirometry  - Assess the need for suctioning and aspirate as needed  - Assess and instruct to report SOB or any respiratory difficulty  - Respiratory Therapy support as indicated  Outcome: Progressing

## 2025-05-27 NOTE — ASSESSMENT & PLAN NOTE
Chronic, noted in ED on EKG  Rate on admission less than 100  Medications include Eliquis 5 mg twice daily, ASA 81 mg daily, Cardizem 100 mg twice daily, Toprol  mg  Noting that he not take his medications at home    Patient in Afib on Tele. HR avg 90s - 100s.     Resumed home medications  Monitored on telemetry    Continue home meds

## 2025-05-27 NOTE — ASSESSMENT & PLAN NOTE
>>ASSESSMENT AND PLAN FOR COPD, SEVERITY TO BE DETERMINED (HCC) WRITTEN ON 5/27/2025  3:14 PM BY PIYUSH PEREZ, DO    Chronic, not following with pulmonology  Home inhalers include albuterol and Breo  Patient noting that he had not been compliant with home medications or inhalers   Decreased breath sounds bilaterally with intermittent wheezing at bases    Plan:  Resume home inhalers  PRN Xopenex in setting of afib

## 2025-05-27 NOTE — ASSESSMENT & PLAN NOTE
Chronic, noting multiple falls at home  Patient reporting that he lives alone. History of alcohol abuse    PT/OT evaluated  Pt. continuing to walk with a walker with supervision for 200 feet+   Recommended outpatient PT  [facility] [place], follow up  S/p Fall precautions

## 2025-05-27 NOTE — ASSESSMENT & PLAN NOTE
Chronic, stable.  Home regimen: Protonix 40 mg, Maalox 30 mL every 4 as needed, aluminum magnesium hydroxide 5 mL every 6 hours as needed.    Continue home medication

## 2025-05-27 NOTE — PLAN OF CARE
Problem: Potential for Falls  Goal: Patient will remain free of falls  Description: INTERVENTIONS:  - Educate patient/family on patient safety including physical limitations  - Instruct patient to call for assistance with activity   - Consider consulting OT/PT to assist with strengthening/mobility based on AM PAC & JH-HLM score  - Consult OT/PT to assist with strengthening/mobility   - Keep Call bell within reach  - Keep bed low and locked with side rails adjusted as appropriate  - Keep care items and personal belongings within reach  - Initiate and maintain comfort rounds  - Make Fall Risk Sign visible to staff  - Offer Toileting every 2 Hours, in advance of need  - Initiate/Maintain bed alarm  - Obtain necessary fall risk management equipment: bed alarm  - Apply yellow socks and bracelet for high fall risk patients  - Consider moving patient to room near nurses station  Outcome: Progressing     Problem: PAIN - ADULT  Goal: Verbalizes/displays adequate comfort level or baseline comfort level  Description: Interventions:  - Encourage patient to monitor pain and request assistance  - Assess pain using appropriate pain scale  - Administer analgesics as ordered based on type and severity of pain and evaluate response  - Implement non-pharmacological measures as appropriate and evaluate response  - Consider cultural and social influences on pain and pain management  - Notify physician/advanced practitioner if interventions unsuccessful or patient reports new pain  - Educate patient/family on pain management process including their role and importance of  reporting pain   - Provide non-pharmacologic/complimentary pain relief interventions  Outcome: Progressing     Problem: INFECTION - ADULT  Goal: Absence or prevention of progression during hospitalization  Description: INTERVENTIONS:  - Assess and monitor for signs and symptoms of infection  - Monitor lab/diagnostic results  - Monitor all insertion sites, i.e.  indwelling lines, tubes, and drains  - Monitor endotracheal if appropriate and nasal secretions for changes in amount and color  - [place] appropriate cooling/warming therapies per order  - Administer medications as ordered  - Instruct and encourage patient and family to use good hand hygiene technique  - Identify and instruct in appropriate isolation precautions for identified infection/condition  Outcome: Progressing  Goal: Absence of fever/infection during neutropenic period  Description: INTERVENTIONS:  - Monitor WBC  - Perform strict hand hygiene  - Limit to healthy visitors only  - No plants, dried, fresh or silk flowers with turpin in patient room  Outcome: Progressing     Problem: SAFETY ADULT  Goal: Patient will remain free of falls  Description: INTERVENTIONS:  - Educate patient/family on patient safety including physical limitations  - Instruct patient to call for assistance with activity   - Consider consulting OT/PT to assist with strengthening/mobility based on AM PAC & -HLM score  - Consult OT/PT to assist with strengthening/mobility   - Keep Call bell within reach  - Keep bed low and locked with side rails adjusted as appropriate  - Keep care items and personal belongings within reach  - Initiate and maintain comfort rounds  - Make Fall Risk Sign visible to staff  - Offer Toileting every 2 Hours, in advance of need  - Initiate/Maintain bed alarm  - Obtain necessary fall risk management equipment: b  - Apply yellow socks and bracelet for high fall risk patients  - Consider moving patient to room near nurses station  Outcome: Progressing  Goal: Maintain or return to baseline ADL function  Description: INTERVENTIONS:  -  Assess patient's ability to carry out ADLs; assess patient's baseline for ADL function and identify physical deficits which impact ability to perform ADLs (bathing, care of mouth/teeth, toileting, grooming, dressing, etc.)  - Assess/evaluate cause of self-care deficits   - Assess  range of motion  - Assess patient's mobility; develop plan if impaired  - Assess patient's need for assistive devices and provide as appropriate  - Encourage maximum independence but intervene and supervise when necessary  - Involve family in performance of ADLs  - Assess for home care needs following discharge   - Consider OT consult to assist with ADL evaluation and planning for discharge  - Provide patient education as appropriate  - Monitor functional capacity and physical performance, use of AM PAC & JH-HLM   - Monitor gait, balance and fatigue with ambulation    Outcome: Progressing  Goal: Maintains/Returns to pre admission functional level  Description: INTERVENTIONS:  - Perform AM-PAC 6 Click Basic Mobility/ Daily Activity assessment daily.  - Set and communicate daily mobility goal to care team and patient/family/caregiver.   - Collaborate with rehabilitation services on mobility goals if consulted  - Perform Range of Motion 4 times a day.  - Reposition patient every 24 hours.  - Dangle patient 4 times a day  - Stand patient 4 times a day  - Ambulate patient 4 times a day  - Out of bed to chair 4 times a day   - Out of bed for meals 3 times a day  - Out of bed for toileting  - Record patient progress and toleration of activity level   Outcome: Progressing     Problem: DISCHARGE PLANNING  Goal: Discharge to home or other facility with appropriate resources  Description: INTERVENTIONS:  - Identify barriers to discharge w/patient and caregiver  - Arrange for needed discharge resources and transportation as appropriate  - Identify discharge learning needs (meds, wound care, etc.)  - Arrange for interpretive services to assist at discharge as needed  - Refer to Case Management Department for coordinating discharge planning if the patient needs post-hospital services based on physician/advanced practitioner order or complex needs related to functional status, cognitive ability, or social support system  Outcome:  Progressing     Problem: Knowledge Deficit  Goal: Patient/family/caregiver demonstrates understanding of disease process, treatment plan, medications, and discharge instructions  Description: Complete learning assessment and assess knowledge base.  Interventions:  - Provide teaching at level of understanding  - Provide teaching via preferred learning methods  Outcome: Progressing     Problem: Nutrition/Hydration-ADULT  Goal: Nutrient/Hydration intake appropriate for improving, restoring or maintaining nutritional needs  Description: Monitor and assess patient's nutrition/hydration status for malnutrition. Collaborate with interdisciplinary team and initiate plan and interventions as ordered.  Monitor patient's weight and dietary intake as ordered or per policy. Utilize nutrition screening tool and intervene as necessary. Determine patient's food preferences and provide high-protein, high-caloric foods as appropriate.     INTERVENTIONS:  - Monitor oral intake, urinary output, labs, and treatment plans  - Assess nutrition and hydration status and recommend course of action  - Evaluate amount of meals eaten  - Assist patient with eating if necessary   - Allow adequate time for meals  - Recommend/ encourage appropriate diets, oral nutritional supplements, and vitamin/mineral supplements  - Order, calculate, and assess calorie counts as needed  - Recommend, monitor, and adjust tube feedings and TPN/PPN based on assessed needs  - Assess need for intravenous fluids  - Provide specific nutrition/hydration education as appropriate  - Include patient/family/caregiver in decisions related to nutrition  Outcome: Progressing     Problem: Prexisting or High Potential for Compromised Skin Integrity  Goal: Skin integrity is maintained or improved  Description: INTERVENTIONS:  - Identify patients at risk for skin breakdown  - Assess and monitor skin integrity including under and around medical devices   - Assess and monitor  nutrition and hydration status  - Monitor labs  - Assess for incontinence   - Turn and reposition patient  - Assist with mobility/ambulation  - Relieve pressure over eduardo prominences   - Avoid friction and shearing  - Provide appropriate hygiene as needed including keeping skin clean and dry  - Evaluate need for skin moisturizer/barrier cream  - Collaborate with interdisciplinary team  - Patient/family teaching  - Consider wound care consult    Assess:  - Review Herrera scale daily  - Clean and moisturize skin every sift  - Inspect skin when repositioning, toileting, and assisting with ADLS  - Assess under medical devices such as masimo every shift  - Assess extremities for adequate circulation and sensation     Bed Management:  - Have minimal linens on bed & keep smooth, unwrinkled  - Change linens as needed when moist or perspiring  - Avoid sitting or lying in one position for more than 2 hours while in bed?Keep HOB at 45degrees   - Toileting:  - Offer bedside commode  - Assess for incontinence every 2 hours  - Use incontinent care products after each incontinent episode such as gray pads    Activity:  - Mobilize patient 4 times a day  - Encourage activity and walks on unit  - Encourage or provide ROM exercises   - Turn and reposition patient every 2 Hours  - Use appropriate equipment to lift or move patient in bed  - Instruct/ Assist with weight shifting every incident when out of bed in chair  - Consider limitation of chair time 2 hour intervals    Skin Care:  - Avoid use of baby powder, tape, friction and shearing, hot water or constrictive clothing  - Relieve pressure over bony prominences using waffle cushion  - Do not massage red bony areas    Next Steps:  - Teach patient strategies to minimize risks such as falls  - Consider consults to  interdisciplinary teams such as PT/OT  Outcome: Progressing     Problem: RESPIRATORY - ADULT  Goal: Achieves optimal ventilation and oxygenation  Description:  INTERVENTIONS:  - Assess for changes in respiratory status  - Assess for changes in mentation and behavior  - Position to facilitate oxygenation and minimize respiratory effort  - Oxygen administered by appropriate delivery if ordered  - Initiate smoking cessation education as indicated  - Encourage broncho-pulmonary hygiene including cough, deep breathe, Incentive Spirometry  - Assess the need for suctioning and aspirate as needed  - Assess and instruct to report SOB or any respiratory difficulty  - Respiratory Therapy support as indicated  Outcome: Progressing

## 2025-05-27 NOTE — ASSESSMENT & PLAN NOTE
As evidenced by tachycardia, tachypnea -query source of infection  WBC WNL, Pro-Vlad WNL x 1  UA: Light orange, high specific gravity, protein, ketones, 0 below tension, bilirubin, occult blood  History of recurrent admissions for similar concerns  Last CXR with pneumonitis versus pneumonia in right lobe  ED course: Azithromycin x 1, Rocephin x 1  Previously given vancomycin p.o. for C. Difficile, patient endorses that he had not been taking  Denies current diarrhea  IVF with D5  CXR: No acute cardiopulmonary disease.   Proca.09 < 0.09    Held off Abx (besides Vanc for C. Dif)

## 2025-05-27 NOTE — UTILIZATION REVIEW
OBSERVATION 5-24-25 CHANGED TO INPATIENT 5-25-25 FOR ALCOHOL WITHDRAWAL , IV FLUIDS.      Initial Clinical Review    Admission: Date/Time/Statement:     Admission Orders (From admission, onward)       Ordered        05/25/25 1646  INPATIENT ADMISSION  Once            05/24/25 2049  Place in Observation  Once                             ED Arrival Information       Expected   -    Arrival   5/24/2025 15:42    Acuity   Emergent              Means of arrival   Ambulance    Escorted by   [facility]   Hospitalist    Admission type   Emergency              Arrival complaint   etoh             Chief Complaint   Patient presents with    Alcohol Intoxication       Initial Presentation: 65 y.o. male presents to ed from home via ems on 5-24 for evaluation and treatment of alcohol intoxication. Seen in ed 24 hurs ago.  Last drink earlier today. Baseline 1qt of vodka daily. Not taking his medications for several days.   PMHX:  alcohol withdrawal, AFIB, CAD, COPD, CABG,HTN, prostate cancer.  Clinical assessment significant for temp 9 6.4, hr rt 113, spo2 84% ra, bp 86/51, pain 7/10. CIWA =7 ( tremor, headache, anxiety, nausea/vomiting). CR 1.39 ( baseline 0.8-1) , AST 99, ALK PHOS 155, MAG 1.3. Last CXR with pneumonitis versus pneumonia in right lobe.  Initially treated with iv .9% ns 1L bolus, duo neb x1, iv Mag x1, iv D50% x1, iv valium x1, iv ceftriaxone x1.  Admit to observation for alcohol withdrawal, SIRS, pneumonitis, on vanco for C diff, ENMA.  Plan includes: CIWA assessments, thiamine, folic acid, B6, multi vitamin, seizure precautions, fall precautions, po vancomycin, iv LR 50/hr, 2-4L O2nc, check PVR, I/O .    Anticipated Length of Stay/Certification Statement: Patient will be admitted on an observation basis with an anticipated length of stay of less than 2 midnights secondary to alcohol detox.     Date: 5-25-25    Day 2: observation changed to inpatient  Certification Statement: The patient will continue  to require additional inpatient hospital stay due to alcohol intoxication   Discharge Plan: Anticipate discharge in 24-48 hrs to home.    CIWA=7 ( headache and tremor).  Remains on iv LR 50/hr, po thiamine, po vancomycin, po B6, po multi vitamin.      ED Treatment-Medication Administration from 05/24/2025 1542 to 05/24/2025 2134         Date/Time Order Dose Route Action     05/24/2025 1620 sodium chloride 0.9 % bolus 1,000 mL 1,000 mL Intravenous New Bag     05/24/2025 1643 ipratropium-albuterol (DUO-NEB) 0.5-2.5 mg/3 mL inhalation solution 3 mL 3 mL Nebulization Given     05/24/2025 1643 dextrose 50 % IV solution 25 g 25 g Intravenous Given     05/24/2025 1651 magnesium sulfate 2 g/50 mL IVPB (premix) 2 g 2 g Intravenous New Bag     05/24/2025 2027 diazepam (VALIUM) injection 10 mg 10 mg Intravenous Given     05/24/2025 2112 cefTRIAXone (ROCEPHIN) IVPB (premix in dextrose) 1,000 mg 50 mL 1,000 mg Intravenous New Bag       Scheduled Medications:    apixaban, 5 mg, Oral, BID  aspirin, 81 mg, Oral, Daily  atorvastatin, 40 mg, Oral, Daily  calcium carbonate, 1,000 mg, Oral, Daily  diltiazem, 120 mg, Oral, BID  ferrous sulfate, 325 mg, Oral, Daily With Breakfast  fluticasone-vilanterol, 1 puff, Inhalation, Daily  folic acid, 1 mg, Oral, Daily  gabapentin, 300 mg, Oral, TID  insulin lispro, 1-5 Units, Subcutaneous, TID With Meals  insulin lispro, 1-5 Units, Subcutaneous, HS  magnesium Oxide, 800 mg, Oral, BID  melatonin, 3 mg, Oral, HS  metoprolol succinate, 150 mg, Oral, Q12H SEDA  multivitamin stress formula, 1 tablet, Oral, Daily  nicotine, 1 patch, Transdermal, Daily  pantoprazole, 40 mg, Oral, Daily  pyridoxine, 100 mg, Oral, Daily  ranolazine, 500 mg, Oral, BID  saccharomyces boulardii, 250 mg, Oral, BID  tamsulosin, 0.4 mg, Oral, Daily With Dinner  vitamin B-1, 100 mg, Oral, Daily  vancomycin oral (capsules or solution), 125 mg, Oral, Q6H SEDA      Continuous IV Infusions:  lactated ringers, 50 mL/hr, Intravenous,  Continuous      PRN Meds:  acetaminophen, 650 mg, Oral, Q6H PRN  albuterol, 2 puff, Inhalation, Q4H PRN  aluminum-magnesium hydroxide-simethicone, 30 mL, Oral, Q4H PRN  levalbuterol, 0.63 mg, Nebulization, Q8H PRN      ED Triage Vitals   Temperature Pulse Respirations Blood Pressure SpO2 Pain Score   05/24/25 1549 05/24/25 1549 05/24/25 1549 05/24/25 1549 05/24/25 1549 05/24/25 2200   (!) 96.9 °F   (36.1 °C) 85 14 (!) 86/51 (!) 88 % No Pain     Weight (last 2 days)       Date/Time Weight    05/27/25 0600 74 (163.14)    05/26/25 0600 74.2 (163.58)    05/25/25 0600 74.5 (164.3)            Vital Signs (last 3 days)       Date/Time Temp Pulse Resp BP MAP  SpO2 Nasal Cannula O2 Flow Rate (L/min) CIWA-Ar Total Pain    05/25/25 15:17:21 99 °F   (37.2 °C) -- 17 151/87 108 -- -- 7 --    05/25/25 14:26:12 98.9 °F (37.2 °C) -- 19 148/84 105 -- -- -- --    05/25/25 1346 97.9 °F (36.6 °C) 93 18 142/78 -- 92 % -- -- --    05/25/25 12:55:48 -- 101 -- 118/86 97 -- -- 7 --    05/25/25 1246 99 °F (37.2 °C) 99 19 -- -- 93 % -- -- --    05/25/25 0800 -- -- -- -- -- -- -- 7 --    05/25/25 07:58:04 99.2 °F (37.3 °C) 102 19 146/74 98 96 % 2 L/min -- 4    05/25/25 06:11:49 98.9 °F (37.2 °C) 97 18 144/78 100 97 % 2 L/min -- --    05/25/25 0408 -- 86 -- 118/66 -- -- -- 6 --    05/25/25 0400 -- 79 -- -- -- 98 % 3 L/min -- --    05/25/25 0328 -- 78 -- -- -- 88 % -- -- --    05/25/25 0315 -- -- -- -- -- 90 % -- -- --    05/25/25 0123 -- -- -- -- -- 98 % -- -- --    05/25/25 0054 -- 97 -- -- -- 94 % -- -- --    05/25/25 00:08:58 98 °F   (36.7 °C) 87 -- 158/86 110 98 % 3 L/min -- --    05/25/25 00:07:58 -- 107 -- 158/86 110 98 % -- -- --    05/25/25 00:07:17 -- 103 18 158/86 110 98 % -- 7 --    05/24/25 2200 -- -- -- -- -- -- -- -- No Pain    05/24/25 21:54:05 98.1 °F (36.7 °C) 93 18 116/72 87 97 % 3 L/min -- --    05/24/25 2100 -- 87 18 122/73 92 99 % -- -- --    05/24/25 2045 -- 85 18 -- -- 99 % 4 L/min -- --    05/24/25 2030 -- 99 18 144/79  104 94 % -- -- --    05/24/25 2017 -- 82 -- 137/84 -- -- -- 7 --    05/24/25 2000 -- 82 16 137/84 104 99 % 2 L/min -- --    05/24/25 1900 -- 90 20 106/57 75 93 % 2 L/min -- --    05/24/25 1830 -- 72 17 112/56 76 98 % -- -- --    05/24/25 1800 -- 71 20 100/59 77 98 % -- -- --    05/24/25 1730 -- 73 17 117/62 82 98 % -- -- --    05/24/25 1700 -- 85 17 126/83 100 98 % -- -- --    05/24/25 1630 -- 71 16 102/64 77 97 % 3 L/min -- --    05/24/25 1627 -- -- -- -- -- 96 % -- -- --    05/24/25 1615 -- 74 16 102/59 79 84 % -- -- --    05/24/25 1549 96.9 °F (36.1 °C) 85 14 86/51 -- 88 % -- -- --        CIWA-Ar Score       Row Name 05/25/25 15:17:21 05/25/25 12:55:48 05/25/25 0800       CIWA-Ar    Pulse -- 101 --    Nausea and Vomiting 0 0 0    Tactile Disturbances 0 0 0    Tremor 6 6 7    Auditory Disturbances 0 0 0    Paroxysmal Sweats 0 0 0    Visual Disturbances 0 0 0    Anxiety 0 0 0    Headache, Fullness in Head 1 0 0    Agitation 0 1 0    Orientation and Clouding of Sensorium 0 0 0    CIWA-Ar Total 7 7 7      Row Name 05/25/25 0408 05/25/25 00:07:17 05/24/25 2017       CIWA-Ar    BP -- -- 137/84    Pulse -- -- 82    Nausea and Vomiting 0 0 1    Tactile Disturbances 0 0 0    Tremor 4 5 4    Auditory Disturbances 0 0 0    Paroxysmal Sweats 0 0 0    Visual Disturbances 0 0 0    Anxiety 1 1 1    Headache, Fullness in Head 1 1 1    Agitation 0 0 0    Orientation and Clouding of Sensorium 0 0 0    CIWA-Ar Total 6 7 7                 Pertinent Labs/Diagnostic Test Results:     Radiology:    XR chest 1 view portable   Final (05/25 1113)      No acute cardiopulmonary disease.          Results from last 7 days   Lab Units 05/27/25  0528 05/26/25  1840 05/25/25  0607 05/24/25  1614 05/21/25  0159 05/21/25  0159   WBC Thousand/uL 4.69 5.26 7.96 6.49  --  4.21*   HEMOGLOBIN g/dL 8.3* 9.6* 9.4* 10.7*  --  10.9*   HEMATOCRIT % 25.8* 29.9* 29.2* 34.4*  --  34.0*   PLATELETS Thousands/uL 53* 59* 97* 135*  --  187   TOTAL NEUT ABS  Thousands/µL 2.90 3.92 6.23 4.73   < >  --    BANDS PCT %  --   --   --   --   --  1    < > = values in this interval not displayed.         Results from last 7 days   Lab Units 05/27/25  0528 05/26/25 1840 05/25/25  0607 05/25/25 0100 05/24/25  1614   SODIUM mmol/L 133* 131* 141 143 142   POTASSIUM mmol/L 3.7 4.2 3.8 3.9 4.9   CHLORIDE mmol/L 96 96 101 103 100   CO2 mmol/L 24 25 24 23 24   ANION GAP mmol/L 13 10 16* 17* 18*   BUN mg/dL 15 16 17 20 19   CREATININE mg/dL 0.96 0.95 0.90 1.13 1.39*   EGFR ml/min/1.73sq m 83 84 90 68 53   CALCIUM mg/dL 8.1* 8.3* 7.5* 7.3* 7.3*   CALCIUM, IONIZED mmol/L  --  1.03*  --   --   --    MAGNESIUM mg/dL 1.3* 1.5* 2.2  --  1.3*     Results from last 7 days   Lab Units 05/25/25 0607 05/24/25 2229 05/24/25  1614   AST U/L 63*  --  99*   ALT U/L 39  --  47   ALK PHOS U/L 145*  --  155*   TOTAL PROTEIN g/dL 6.4  --  6.7   ALBUMIN g/dL 3.2*  --  3.2*   TOTAL BILIRUBIN mg/dL 0.68  --  0.74   AMMONIA umol/L  --  33  --      Results from last 7 days   Lab Units 05/27/25  1142 05/27/25  0724 05/26/25 2102 05/26/25  1537 05/26/25  1104 05/26/25  0707 05/25/25 1959 05/25/25  1535 05/25/25  1113 05/25/25  0720 05/24/25 2221 05/24/25  1603   POC GLUCOSE mg/dl 151* 136 128 135 81 92 173* 142* 137 88 112 68     Results from last 7 days   Lab Units 05/27/25  0528 05/26/25 1840 05/25/25  0607 05/25/25  0100 05/24/25  1614 05/21/25  0159   GLUCOSE RANDOM mg/dL 126 148* 82 106 65 128             BETA-HYDROXYBUTYRATE   Date Value Ref Range Status   01/22/2024 3.2 (H) <0.6 mmol/L Final     Results from last 7 days   Lab Units 05/25/25  0607 05/24/25  1614   PROCALCITONIN ng/ml 0.09 0.09     Results from last 7 days   Lab Units 05/24/25  1614   LIPASE u/L 44       Results from last 7 days   Lab Units 05/24/25  2325   CLARITY UA  Clear   COLOR UA  Light Orange   SPEC GRAV UA  >=1.030   PH UA  6.0   GLUCOSE UA mg/dl Negative   KETONES UA mg/dl 10 (1+)*   BLOOD UA  Small*   PROTEIN UA mg/dl 100  (2+)*   NITRITE UA  Negative   BILIRUBIN UA  Small*   UROBILINOGEN UA (BE) mg/dl 4.0*   LEUKOCYTES UA  Negative   WBC UA /hpf 2-4   RBC UA /hpf 1-2   BACTERIA UA /hpf Occasional   EPITHELIAL CELLS WET PREP /hpf Occasional     Results from last 7 days   Lab Units 05/25/25  1044 05/21/25  0159   ETHANOL LVL mg/dL <10 254*       Past Medical History[1]  Diagnosis Date    Acute on chronic kidney failure  (HCC)      Alcohol abuse      Alcohol withdrawal (HCC) 06/07/2019    Atrial fibrillation (HCC)      Cancer (HCC)       prostate ca,had radiation    Cardiac disease       stents,then triple bypass    COPD (chronic obstructive pulmonary disease) (HCC)      Coronary artery disease      ETOH abuse      Heart failure (HCC)      History of heart surgery       [facility] triple bypass [facility]    Hx of heart artery stent       2014    Hyperlipidemia      Hypertension      Hyponatremia 10/17/2019    Hypovolemic shock ([facility]) 12/22/2019    Lumbar spondylitis ([facility]) 10/13/2022    Metabolic acidosis, increased anion gap 04/21/2021    Nasal bone fracture 10/10/2022    Prostate CA ([facility])      S/P CABG x 3       2004    Sleep apnea      Present on Admission:   COPD, severity to be determined ([facility])   Type 2 diabetes mellitus with chronic kidney disease (HCC)   Dyslipidemia   Alcohol withdrawal (HCC)   CAD (coronary artery disease)   Atrial fibrillation (HCC)   GERD (gastroesophageal reflux disease)   SIRS (systemic inflammatory response syndrome) (HCC)   Acute respiratory failure with hypoxia (HCC)   Nicotine abuse   Chronic heart failure with preserved ejection fraction (HCC)   QT prolongation   Increased anion gap   Acute kidney injury superimposed on chronic kidney disease  (HCC)   Ambulatory dysfunction   Hypertension   Recurrent Clostridioides difficile diarrhea   Electrolyte abnormality   Abnormal LFTs      Admitting Diagnosis:     Alcohol intoxication (HCC) [F10.929]  Hypoxia [R09.02]    Age/Sex: 65 y.o. male    Network  Utilization Review Department  ATTENTION: Please call with any questions or concerns to [phone] and carefully listen to the prompts so that you are directed to the right person. All voicemails are confidential.   For Discharge needs, contact Care Management DC Support Team at [phone] opt. [phone]  Send all requests for admission clinical reviews, approved or denied determinations and any other requests to dedicated fax number below belonging to the [place] where the patient is receiving treatment. List of dedicated fax numbers for the Facilities:  FACILITY NAME UR FAX NUMBER   ADMISSION DENIALS (Administrative/Medical Necessity) [phone]   DISCHARGE SUPPORT TEAM (NETWORK) [phone]   PARENT CHILD HEALTH (Maternity/NICU/Pediatrics) [phone]   [facility] [phone]   [facility] [phone]   [facility] [phone]   [facility] [phone]   [facility] [phone]   [facility] [phone]   [facility] [phone]   [facility] [phone]   [facility] [phone]   Count includes the [facility] [phone]   [facility] [phone]   [facility] [phone]               [1]   Past Medical History:  Diagnosis Date    Acute on chronic kidney failure  (HCC)     Alcohol abuse     Alcohol withdrawal (HCC) 06/07/2019    Atrial fibrillation (HCC)     Cancer (HCC)     prostate ca,had radiation    Cardiac disease     stents,then triple bypass    COPD (chronic obstructive pulmonary disease) (HCC)     Coronary artery disease     ETOH abuse     Heart failure (HCC)     History of heart surgery     says triple bypass [facility]    Hx of heart artery stent     2014    Hyperlipidemia      Hypertension     Hyponatremia 10/17/2019    Hypovolemic shock ([facility]) 12/22/2019    Lumbar spondylitis ([facility]) 10/13/2022    Metabolic acidosis, increased anion gap 04/21/2021    Nasal bone fracture 10/10/2022    Prostate CA ([facility])     S/P CABG x 3     2004    Sleep apnea

## 2025-05-27 NOTE — ASSESSMENT & PLAN NOTE
Chronic, stable.  Continues to smoke daily.    Ordered nicotine 21 mg patch  Encouraged smoking cessation

## 2025-05-27 NOTE — ASSESSMENT & PLAN NOTE
Lab Results   Component Value Date    HGBA1C 5.2 05/05/2025       Recent Labs     05/26/25  1537 05/26/25  2102 05/27/25  0724 05/27/25  1142   POCGLU 135 128 136 151*       Blood Sugar Average: Last 72 hrs:  (P) 120.25  Chronic, well-controlled  Medications include metformin 500 mg twice daily  Patient noting that he had not been taking his medications or eating regularly for several days  Hypoglycemia noted on admission, improving with p.o. intake and IVF with D5    Plan:  Hold home metformin  Insulin sliding scale  Carb controlled diet  Hypoglycemia protocol

## 2025-05-27 NOTE — DISCHARGE SUMMARY
Discharge Summary - Family Medicine   Name: Gregg Partida 65 y.o. male I MRN: [id number]  Unit/Bed#: [address] [address] I Date of Admission: 2025   Date of Service: 2025 I Hospital Day: 2     Assessment & Plan  Alcohol withdrawal (HCC)  Chronic -history of alcohol abuse with recurrent admissions for alcohol withdrawal  Stating last drink was on a.m. of presentation  Noting some fine tremors on examination especially when trying to complete task like eating. Endorsing that he has not been taking his medications including naltrexone or vitamins for at least a week.  Additionally noting that he has not been eating regularly for several days    ED course: 1 g Rocephin, 10 mg Valium IV, 2 g mag sulfate, D5 25 g and 1 L normal saline bolus, DuoNeb x 1  Folate wnl  B12 wnl      S/p CIWA protocol  S/p Case management consulted, appreciate reccs  S/p Continue thiamine, folic acid, B6, multivitamin supplementation  S/p Seizure precautions, Aspiration precautions, Fall precautions  S/p Telemetry   SIRS (systemic inflammatory response syndrome) (HCC)  As evidenced by tachycardia, tachypnea -query source of infection  WBC WNL, Pro-Vlad WNL x 1  UA: Light orange, high specific gravity, protein, ketones, 0 below tension, bilirubin, occult blood  History of recurrent admissions for similar concerns  Last CXR with pneumonitis versus pneumonia in right lobe  ED course: Azithromycin x 1, Rocephin x 1  Previously given vancomycin p.o. for C. Difficile, patient endorses that he had not been taking  Denies current diarrhea  IVF with D5  CXR: No acute cardiopulmonary disease.   Proca.09 < 0.09    Held off Abx (besides Vanc for C. Dif)   Recurrent Clostridioides difficile diarrhea  Previous history of recurrent C. Difficile  Started on p.o. vancomycin taper during previous hospitalization on . Patient reporting that he had not been compliant with vancomycin taper    Repeat C diff toxin PCR & EIA positive    Dc'ed Vancomycin  taper  Started PO Vanc 125 mg Q6 (5/25 - ) to complete 2 week course   S/p Strict contact and hand hygiene  Monitored stool output     Discharged with vancomycin 125 mg Q6 for 2 weeks total  Follow-up with PCP outpatient at [facility]  Acute respiratory failure with hypoxia (HCC)  Presented to ED with hypoxia 84 to 88% on room air  Requiring 2 to 4 L nasal cannula on admission  Tolerating titration down on O2  Denying cough or feeling short of breath  History of COPD, noncompliant with home medications    ED course: Rocephin x 1, azithromycin times  CXR: No acute cardiopulmonary disease.     Patient on RA, satting fine.    Titrated O2 to keep SPO2 greater than 88%  Wean off O2 as able   Resumed home inhalers  Incentive spirometry  Rest of plan per SIRS  Acute kidney injury superimposed on chronic kidney disease  (HCC)  Creatinine 1.39 on admission, baseline Cr 0.8-1  Patient reporting that he had not been eating for several days, presented with EtOH withdrawal  UA: 1+ ketones, small blood, 2+ protein, small bili, 4.0 urobilinogen, 2-4 hyaline casts, 0-3 granular casts     Labs: Cr 1.13 > 0.90    S/p gentle IV rehydration w/ 50 mL/hr IV LR  S/p Urinary retention protocol  S/p PVR    Continue home Flomax  Increased anion gap  Likely starvation ketosis as patient reportedly not been eating regularly  CO2 24 on admission, AG 18  UA with ketones  Creatinine 1.39 on admission    S/p gentle IVF    COPD, severity to be determined (HCC)  Chronic, not following with pulmonology  Home inhalers include albuterol and Breo  Patient noting that he had not been compliant with home medications or inhalers   Decreased breath sounds bilaterally with intermittent wheezing at bases    Resumed home inhalers  S/p PRN Xopenex in setting of afib     Continue home inhalers  Type 2 diabetes mellitus with chronic kidney disease (HCC)  Lab Results   Component Value Date    HGBA1C 5.2 05/05/2025       Recent Labs     05/26/25  1104 05/26/25  1537  05/26/25 2102 05/27/25  0724   POCGLU 81 135 128 136       Blood Sugar Average: Last 72 hrs:  (P) [id number]  Chronic, well-controlled  Medications include metformin 500 mg twice daily  Patient noting that he had not been taking his medications or eating regularly for several days  Hypoglycemia noted on admission, improving with p.o. intake and IVF with D5    Held home metformin  S/p Insulin sliding scale  S/p Carb controlled diet  S/p Hypoglycemia protocol    Continue home metformin  Dyslipidemia  Chronic, last lipid panel (11/24): Total cholesterol 153, , LDL 87, HDL 46.  Home regimen: Atorvastatin 40 mg.    Continue home Lipitor  History of prostate cancer  Stable, history of prostate cancer in 2020 s/p resection 2021.  Home regimen: Flomax 0.4 mg daily  Noting that he had not been taking his home medications    Continue home regimen  CAD (coronary artery disease)  H/O CAD s/p CABG.  Home regimen: ASA 81 mg, ranolazine 500 mg every 12 hours  Noting that he not been taking his medications    Continue home regimen  Nicotine abuse  Chronic, stable.  Continues to smoke daily.    Ordered nicotine 21 mg patch  Encouraged smoking cessation  Atrial fibrillation (HCC)  Chronic, noted in ED on EKG  Rate on admission less than 100  Medications include Eliquis 5 mg twice daily, ASA 81 mg daily, Cardizem 100 mg twice daily, Toprol  mg  Noting that he not take his medications at home    Patient in Afib on Tele. HR avg 90s - 100s.     Resumed home medications  Monitored on telemetry    Continue home meds  Chronic heart failure with preserved ejection fraction (HCC)  Wt Readings from Last 3 Encounters:   05/27/25 74 kg (163 lb 2.3 oz)   05/13/25 79.1 kg (174 lb 4.8 oz)   05/08/25 86 kg (189 lb 9.5 oz)     Chronic - appears euvolemic on admission  Echo (2/2025): EF 50 to 54%, wall motion could not be accurately assessed.  Indeterminate diastolic function.  RV cavity normal with reduced systolic function and  abnormal tricuspid annular plane systolic excursion less than 1.7 cm.  LA severely dilated.  RA pressure estimated greater than 15 mmHg.  Estimated pulmonary artery systolic pressure is 63.9 mmHg.    S/p gentle IVF  QT prolongation  On admission EKG QT C interval 483  Admission mag 1.3    Monitored on telemetry  Repleted electrolytes as indicated   Avoided QT prolonging medications  Ambulatory dysfunction  Chronic, noting multiple falls at home  Patient reporting that he lives alone. History of alcohol abuse    PT/OT evaluated  Pt. continuing to walk with a walker with supervision for 200 feet+   Recommended outpatient PT  Patient requested [facility] down at [place], follow up  S/p Fall precautions  GERD (gastroesophageal reflux disease)  Chronic, stable.  Home regimen: Protonix 40 mg, Maalox 30 mL every 4 as needed, aluminum magnesium hydroxide 5 mL every 6 hours as needed.    Continue home medication   Hypertension  Chronic has a history of hypo and hypertension  Presented to ED with hypotension    Status post IVF, BPs normalized  Electrolyte abnormality  Mag 1.3 on admission  Monitored and replete electrolytes as indicated    Discharged with magnesium gluconate tablets  Follow-up with PCP outpatient  Abnormal LFTs  AST 99, ALT 47, alk phos 155       Medical Problems       Resolved Problems  Date Reviewed: 5/5/2025   None       Discharging Physician / Practitioner: Nicky Messer DO  PCP: Lilliana Bliss MD  Admission Date:   Admission Orders (From admission, onward)       Ordered        05/25/25 1646  INPATIENT ADMISSION  Once            05/24/25 2049  Place in Observation  Once                          Discharge Date: 05/27/25    Next Steps for Physician/AP Assuming Care:  Has the patient been taking his vancomycin?  Has the diarrhea resolved?  Has the patient been taking his magnesium tablets?  Has the patient cut down/stopped drinking?  Will the patient like any rereferrals to alcohol  rehab?    Test Results Pending at Discharge (will require follow up):  None    Medication Changes for Discharge & Rationale:   Started vancomycin 125 mg Q6 hours for 2 weeks total  See after visit summary for reconciled discharge medications provided to patient and/or family.     Consultations During Hospital Stay:  None    Procedures Performed:   None    Significant Findings / Test Results:   5/28: Na 131, Mg 1.7  5/27: Na 133, Mg 1.3  5/26: Na 131, K 4.2, Mg 1.5, WBC wnl, Hgb 9.6, + C.diff  5/25: AG 18 > 17 , Cr 1.39 > 1.13, AST 99. Alk Phosph 155, Mg 1.3, procal 0.09, ammonia WNL, ethanol < 10, folate and B12 wnl    XR Chest : No acute cardiopulmonary disease.  UA: 2+ protein, 1+ ketones, 4.0 urobil, small bilirubin and occult blood  Urine micro: 2-4 hyaline casts and 0-3 granular casts      Incidental Findings:   None     Hospital Course:   Gregg Partida is a 65 y.o. male patient who originally presented to the hospital on 5/24/2025 due to alcohol withdrawal.    65-year-old male with past medical history of diabetes, CHF, CAD, atrial fibrillation, alcohol abuse, dyslipidemia, GERD, CABG, prostate cancer presented with alcohol intoxication. Patient's last drink was on the day of admission.  Patient also reported fall 2 days prior. Patient was admitted for alcohol abuse with withdrawal and CIWA protocol was started. While here patient also had multiple bouts of diarrhea. Stool workup was performed which showed he was positive for C. Dif., and patient's Vancomycin taper was switched to Vancomycin 125 mg Q6 for 2 weeks total. Patient was supposed to be on Vancomycin taper prior to admission for the same reason, but admitted he was not taking it.  Patient detoxed while here, and once evaluated by PT/OT and deemed safe for discharge, he was discharged home.  Patient was discharged with the 2 weeks total of vancomycin and also magnesium tablets.      Please see above list of diagnoses and related plan for additional  "information.     Discharge Day Visit / Exam:   Subjective:  Patient was evaluated at bedside and found to be stable. Overnight, the patient had no events. Patient states he would like to go home and complete his tasks.  Patient denies shakes, fevers, chest pain, shortness of breath    Vitals: Blood Pressure: 150/87 (05/28/25 0824)  Pulse: 87 (05/28/25 0824)  Temperature: 97.5 °F (36.4 °C) (05/28/25 0824)  Temp Source: Axillary (05/28/25 0824)  Respirations: 16 (05/28/25 0154)  Height: 6' 2\" (188 cm) (05/24/25 2154)  Weight - Scale: 76 kg (167 lb 8.8 oz) (05/28/25 0600)  SpO2: 94 % (05/28/25 0824)  Physical Exam  Constitutional:       General: He is not in acute distress.     Appearance: Normal appearance. He is normal weight. He is not ill-appearing or toxic-appearing.   HENT:      Head: Normocephalic and atraumatic.      Right Ear: Tympanic membrane normal.      Nose: Nose normal.      Mouth/Throat:      Mouth: Mucous membranes are moist.      Pharynx: Oropharynx is clear.     Eyes:      Conjunctiva/sclera: Conjunctivae normal.      Pupils: Pupils are equal, round, and reactive to light.       Cardiovascular:      Rate and Rhythm: Normal rate and regular rhythm.      Pulses: Normal pulses.      Heart sounds: Normal heart sounds.   Pulmonary:      Effort: Pulmonary effort is normal. No respiratory distress.      Breath sounds: Normal breath sounds.   Abdominal:      General: Abdomen is flat. Bowel sounds are normal.      Palpations: Abdomen is soft.      Tenderness: There is no abdominal tenderness. There is no guarding or rebound.     Musculoskeletal:         General: Normal range of motion.      Cervical back: Normal range of motion and neck supple.      Right lower leg: No edema.      Left lower leg: No edema.     Skin:     General: Skin is warm and dry.     Neurological:      General: No focal deficit present.      Mental Status: He is alert and oriented to person, place, and time. Mental status is at baseline. "     Psychiatric:         Mood and Affect: Mood normal.         Behavior: Behavior normal.         Thought Content: Thought content normal.         Judgment: Judgment normal.          Discussion with Family: Patient declined call to .     Discharge instructions/Information to patient and family:   See after visit summary for information provided to patient and family.      Provisions for Follow-Up Care:  See after visit summary for information related to follow-up care and any pertinent home health orders.      Mobility at time of Discharge:   Basic Mobility Inpatient Raw Score: 18  JH-HLM Goal: 6: Walk 10 steps or more  JH-HLM Achieved: 7: Walk 25 feet or more  HLM Goal achieved. Continue to encourage appropriate mobility.     Disposition:   Home    Planned Readmission: Not Likely     Administrative Statements   Discharge Statement:  I have spent a total time of 30 minutes in caring for this patient on the day of the visit/encounter. >30 minutes of time was spent on: Diagnostic results, Prognosis, Risks and benefits of tx options, Instructions for management, Patient and family education, Importance of tx compliance, Risk factor reductions, Impressions, Counseling / Coordination of care, Documenting in the medical record, and Reviewing / ordering tests, medicine, procedures  .

## 2025-05-27 NOTE — ASSESSMENT & PLAN NOTE
On admission EKG QT C interval 483  Admission mag 1.3    Monitored on telemetry  Repleted electrolytes as indicated   Avoided QT prolonging medications

## 2025-05-27 NOTE — ASSESSMENT & PLAN NOTE
Chronic, noted in ED on EKG  Rate on admission less than 100  Medications include Eliquis 5 mg twice daily, ASA 81 mg daily, Cardizem 100 mg twice daily, Toprol  mg  Noting that he not take his medications at home    Patient in Afib on Tele. HR avg 80s.     Plan:  Resume home medications  Monitor on telemetry  Monitor rate control

## 2025-05-27 NOTE — CASE MANAGEMENT
Case Management Assessment & Discharge Planning Note    Patient name Gregg Partida  Location [address] [address]/[address] [address]-* MRN [id number]  : 1960 Date 2025       Current Admission Date: 2025  Current Admission Diagnosis:Alcohol withdrawal ([facility])   Patient Active Problem List    Diagnosis Date Noted    QT prolongation 2025    Abnormal CT scan, cervical spine 2025    Abnormal CXR 2025    Alcohol withdrawal ([facility]) 2025    Acute kidney injury superimposed on chronic kidney disease  ([facility]) 2025    Recurrent Clostridioides difficile diarrhea 2025    Abnormal EKG 2025    Alcohol abuse 2025    Electrolyte abnormality 2025    Hypertension 2025    CAD (coronary artery disease) 2025    A-fib ([facility]) 2025    COPD (chronic obstructive pulmonary disease) ([facility]) 2025    Pancytopenia ([facility]) 2025    Fall 2025    Fall 2025    Aspiration pneumonia of both lower lobes ([facility]) 2025    Alcohol use disorder 2025    CAD (coronary artery disease) 2025    A-fib ([facility]) 2025    COPD (chronic obstructive pulmonary disease) ([facility]) 2025    Tobacco abuse 2025    CKD (chronic kidney disease) stage 2, GFR 60-89 ml/min 2025    Heart failure with preserved ejection fraction ([facility]) 2025    SOB (shortness of breath) 2025    Wound of right buttock 2025    Type 2 diabetes mellitus, without long-term current use of insulin ([facility]) 2025    Abnormal LFTs 2025    Hypomagnesemia 2025    Open wound of buttock 2025    Fracture of multiple ribs 2025    Atrial fibrillation with RVR ([facility]) 2025    Dyspnea on exertion 2025    Smoking 2024    Head injury 2024    Hypotension 2024    Mucus plugging of bronchi 2024    Acute respiratory failure with hypoxia ([facility]) 2024    Hyponatremia 2024    Closed fracture of one rib of left side  11/14/2024    Ambulatory dysfunction 09/14/2024    Financial insecurity 09/14/2024    BPH (benign prostatic hyperplasia) 09/14/2024    Chronic alcohol use 09/14/2024    Hypertension 03/25/2024    Hemorrhagic cystitis 03/25/2024    Hypothyroidism 01/24/2024    Gastrointestinal hemorrhage with melena 10/29/2023    Chest pain 09/21/2023    Longstanding persistent atrial fibrillation (HCC) 09/21/2023    GERD (gastroesophageal reflux disease) 09/21/2023    Stable angina (HCC) 11/17/2022    Stage 3a chronic kidney disease (HCC) 06/09/2022    Fatty liver, alcoholic 04/15/2022    Nonischemic nontraumatic myocardial injury 04/10/2022    Mild protein-calorie malnutrition (HCC) 07/23/2021    Prostate cancer ([facility]) 07/23/2021    Increased anion gap 04/21/2021    Atrial fibrillation ([facility]) 12/05/2020    Chronic heart failure with preserved ejection fraction ([facility]) 12/22/2019    Noncompliance 12/22/2019    ETOH abuse 11/23/2019    SIRS (systemic inflammatory response syndrome) ([facility]) 11/23/2019    Generalized weakness 10/17/2019    Cervical spinal stenosis 10/17/2019    Alcohol withdrawal ([facility]) 06/07/2019    History of prostate cancer 06/07/2019    CAD (coronary artery disease) 06/07/2019    Nicotine abuse 06/07/2019    Depression, recurrent ([facility]) 10/10/2018    Hx of CABG 10/10/2018    Dyslipidemia 10/10/2018    COPD, severity to be determined ([facility]) 10/09/2018    Type 2 diabetes mellitus with chronic kidney disease ([facility]) 10/09/2018    Hypertensive heart and chronic kidney disease with heart failure and stage 1 through stage 4 chronic kidney disease, or chronic kidney disease ([facility]) 10/09/2018      LOS (days): 2  Geometric Mean LOS (GMLOS) (days):   Days to GMLOS:     OBJECTIVE:  PATIENT READMITTED TO HOSPITAL  Risk of Unplanned Readmission Score: 90.98      Current admission status: Inpatient  Referral Reason: Drug/Alcohol Abuse    Preferred Pharmacy:   [facility] [address]  [facility] [address]  Phone: [phone] Fax: [phone]    UNKNOWN - FOLLOW UP PRIOR TO DISCHARGE TO E-PRESCRIBE  No address on file      Primary Care Provider: Lilliana Bliss MD    Primary Insurance: ABHI  REP  Secondary Insurance:     ASSESSMENT:  Active Health Care Proxies       Gregg Partida Jr Health Care Representative - Son   Primary Phone: [phone] (Mobile)            Patient Information  Admitted from:: Home  Mental Status: Alert  During Assessment patient was accompanied by: Not accompanied during assessment  Assessment information provided by:: Patient  Primary Caregiver: Self  Support Systems: Self, Son  County of Residence: [place]  What city do you live in?: [place], [place]  Home entry access options. Select all that apply.: Elevator  Type of Current Residence: Apartment  Floor Level: 5  Living Arrangements: Lives Alone    Activities of Daily Living Prior to Admission  Functional Status: Independent  Completes ADLs independently?: Yes  Ambulates independently?: Yes  Does patient use assisted devices?: Yes  Assisted Devices (DME) used: Walker, Home Oxygen concentrator, Portable Oxygen tanks  DME Company Name (respiratory supplies): [employer]  Does patient currently have HHC?: No     Patient Information Continued  Income Source: SSI/SSD  Does patient have prescription coverage?: Yes  Can the patient afford their medications and any related supplies (such as glucometers or test strips)?: Yes  Does patient receive dialysis treatments?: No  Does patient have a history of substance abuse?: Currently using  Current substance use preference: Alcohol/ETOH  Is patient currently in treatment for substance abuse?: Patient provided treatment options.     Means of Transportation  Means of Transport to Appts:: [facility]     DISCHARGE DETAILS:    Discharge planning discussed with:: Patient  Freedom of Choice: Yes        Were Treatment Team discharge recommendations reviewed with  patient/caregiver?: Yes  Did patient/caregiver verbalize understanding of patient care needs?: Yes  Were patient/caregiver advised of the risks associated with not following Treatment Team discharge recommendations?: Yes    Contacts  Patient Contacts: Gregg Hutchins (son)  Relationship to Patient:: Family  Contact Method: Phone  Phone Number: [phone]  Reason/Outcome: Emergency Contact    Other Referral/Resources/Interventions Provided:  Interventions: Substance Abuse Treatment  Referral Comments: Referral made to [facility] program at [facility] to meet with patient and assess for treatment options.     Treatment Team Recommendation: Home, Outpatient Rehab  Discharge Destination Plan:: Home, Outpatient Rehab  Transport at Discharge : Ride Share

## 2025-05-27 NOTE — ASSESSMENT & PLAN NOTE
>>ASSESSMENT AND PLAN FOR ABNORMAL LFTS WRITTEN ON 5/27/2025  3:14 PM BY PIYUSH PEREZ,     AST 99, ALT 47, alk phos 155  Continue to monitor

## 2025-05-27 NOTE — ASSESSMENT & PLAN NOTE
Chronic has a history of hypo and hypertension  Presented to ED with hypotension    Status post IVF, BPs normalized

## 2025-05-27 NOTE — ASSESSMENT & PLAN NOTE
Presented to ED with hypoxia 84 to 88% on room air  Requiring 2 to 4 L nasal cannula on admission  Tolerating titration down on O2  Denying cough or feeling short of breath  History of COPD, noncompliant with home medications    ED course: Rocephin x 1, azithromycin times  CXR: No acute cardiopulmonary disease.     Tolerating RA    Plan:  Titrate O2 to keep SPO2 greater than 88%  Weaned off O2   Resume home inhalers  Incentive spirometry  Rest of plan per SIRS

## 2025-05-27 NOTE — ASSESSMENT & PLAN NOTE
Chronic, not following with pulmonology  Home inhalers include albuterol and Breo  Patient noting that he had not been compliant with home medications or inhalers   Decreased breath sounds bilaterally with intermittent wheezing at bases    Resumed home inhalers  S/p PRN Xopenex in setting of afib     Continue home inhalers

## 2025-05-27 NOTE — ASSESSMENT & PLAN NOTE
01/30/20        Negra Alonzo  [address]  [address]      Dear Samira Boyle records indicate that you have outstanding lab work and or testing that was ordered for you and has not yet been completed: Fasting lab work   *fast f Stable, history of prostate cancer in 2020 s/p resection 2021.  Home regimen: Flomax 0.4 mg daily  Noting that he had not been taking his home medications    Continue home regimen  Monitor I's and O's

## 2025-05-27 NOTE — ASSESSMENT & PLAN NOTE
Stable, history of prostate cancer in 2020 s/p resection 2021.  Home regimen: Flomax 0.4 mg daily  Noting that he had not been taking his home medications    Continue home regimen

## 2025-05-27 NOTE — DISCHARGE INSTR - AVS FIRST PAGE
START taking Vancomycin 125 mg every 6 hours, to complete the course of 2 weeks    - take twice TODAY, 6 hours apart   - then starting tomorrow 5/29/25, take every 6 hours (so 4 times a day) until completed    START taking Magnesium gluconate, 1 tablet twice a day    Follow up with PCP outpatient at [facility] down at [place], for outpatient physical therapy

## 2025-05-28 ENCOUNTER — TELEPHONE (OUTPATIENT)
Age: 65
End: 2025-05-28

## 2025-05-28 ENCOUNTER — TRANSITIONAL CARE MANAGEMENT (OUTPATIENT)
Age: 65
End: 2025-05-28

## 2025-05-28 VITALS
BODY MASS INDEX: 21.5 KG/M2 | RESPIRATION RATE: 16 BRPM | WEIGHT: 167.55 LBS | HEART RATE: 94 BPM | SYSTOLIC BLOOD PRESSURE: 123 MMHG | TEMPERATURE: 97.5 F | OXYGEN SATURATION: 98 % | DIASTOLIC BLOOD PRESSURE: 78 MMHG | HEIGHT: 74 IN

## 2025-05-28 PROBLEM — N18.9 ACUTE KIDNEY INJURY SUPERIMPOSED ON CHRONIC KIDNEY DISEASE  (HCC): Status: RESOLVED | Noted: 2025-05-07 | Resolved: 2025-05-28

## 2025-05-28 PROBLEM — N17.9 ACUTE KIDNEY INJURY SUPERIMPOSED ON CHRONIC KIDNEY DISEASE  (HCC): Status: RESOLVED | Noted: 2025-05-07 | Resolved: 2025-05-28

## 2025-05-28 PROBLEM — J96.01 ACUTE RESPIRATORY FAILURE WITH HYPOXIA (HCC): Status: RESOLVED | Noted: 2024-12-07 | Resolved: 2025-05-28

## 2025-05-28 LAB
ANION GAP SERPL CALCULATED.3IONS-SCNC: 12 MMOL/L (ref 4–13)
BASOPHILS # BLD AUTO: 0.02 THOUSANDS/ÂΜL (ref 0–0.1)
BASOPHILS NFR BLD AUTO: 1 % (ref 0–1)
BUN SERPL-MCNC: 13 MG/DL (ref 5–25)
CALCIUM SERPL-MCNC: 8.4 MG/DL (ref 8.4–10.2)
CHLORIDE SERPL-SCNC: 96 MMOL/L (ref 96–108)
CO2 SERPL-SCNC: 23 MMOL/L (ref 21–32)
CREAT SERPL-MCNC: 1.01 MG/DL (ref 0.6–1.3)
EOSINOPHIL # BLD AUTO: 0.12 THOUSAND/ÂΜL (ref 0–0.61)
EOSINOPHIL NFR BLD AUTO: 3 % (ref 0–6)
ERYTHROCYTE [DISTWIDTH] IN BLOOD BY AUTOMATED COUNT: 17.3 % (ref 11.6–15.1)
GFR SERPL CREATININE-BSD FRML MDRD: 78 ML/MIN/1.73SQ M
GLUCOSE SERPL-MCNC: 120 MG/DL (ref 65–140)
GLUCOSE SERPL-MCNC: 124 MG/DL (ref 65–140)
GLUCOSE SERPL-MCNC: 171 MG/DL (ref 65–140)
HCT VFR BLD AUTO: 27.9 % (ref 36.5–49.3)
HGB BLD-MCNC: 9.1 G/DL (ref 12–17)
IMM GRANULOCYTES # BLD AUTO: 0.03 THOUSAND/UL (ref 0–0.2)
IMM GRANULOCYTES NFR BLD AUTO: 1 % (ref 0–2)
LYMPHOCYTES # BLD AUTO: 1.17 THOUSANDS/ÂΜL (ref 0.6–4.47)
LYMPHOCYTES NFR BLD AUTO: 26 % (ref 14–44)
MAGNESIUM SERPL-MCNC: 1.7 MG/DL (ref 1.9–2.7)
MCH RBC QN AUTO: 32.3 PG (ref 26.8–34.3)
MCHC RBC AUTO-ENTMCNC: 32.6 G/DL (ref 31.4–37.4)
MCV RBC AUTO: 99 FL (ref 82–98)
MONOCYTES # BLD AUTO: 0.65 THOUSAND/ÂΜL (ref 0.17–1.22)
MONOCYTES NFR BLD AUTO: 15 % (ref 4–12)
NEUTROPHILS # BLD AUTO: 2.45 THOUSANDS/ÂΜL (ref 1.85–7.62)
NEUTS SEG NFR BLD AUTO: 54 % (ref 43–75)
NRBC BLD AUTO-RTO: 0 /100 WBCS
PLATELET # BLD AUTO: 51 THOUSANDS/UL (ref 149–390)
PMV BLD AUTO: 11.6 FL (ref 8.9–12.7)
POTASSIUM SERPL-SCNC: 3.8 MMOL/L (ref 3.5–5.3)
RBC # BLD AUTO: 2.82 MILLION/UL (ref 3.88–5.62)
SODIUM SERPL-SCNC: 131 MMOL/L (ref 135–147)
WBC # BLD AUTO: 4.44 THOUSAND/UL (ref 4.31–10.16)

## 2025-05-28 PROCEDURE — 82948 REAGENT STRIP/BLOOD GLUCOSE: CPT

## 2025-05-28 PROCEDURE — 97110 THERAPEUTIC EXERCISES: CPT

## 2025-05-28 PROCEDURE — 83735 ASSAY OF MAGNESIUM: CPT

## 2025-05-28 PROCEDURE — 85025 COMPLETE CBC W/AUTO DIFF WBC: CPT

## 2025-05-28 PROCEDURE — 99239 HOSP IP/OBS DSCHRG MGMT >30: CPT | Performed by: FAMILY MEDICINE

## 2025-05-28 PROCEDURE — 80048 BASIC METABOLIC PNL TOTAL CA: CPT

## 2025-05-28 RX ORDER — MAGNESIUM SULFATE HEPTAHYDRATE 40 MG/ML
4 INJECTION, SOLUTION INTRAVENOUS ONCE
Status: COMPLETED | OUTPATIENT
Start: 2025-05-28 | End: 2025-05-28

## 2025-05-28 RX ORDER — VANCOMYCIN HYDROCHLORIDE 125 MG/1
125 CAPSULE ORAL EVERY 6 HOURS SCHEDULED
Qty: 42 CAPSULE | Refills: 0 | Status: ON HOLD | OUTPATIENT
Start: 2025-05-29 | End: 2025-06-12

## 2025-05-28 RX ORDER — MAGNESIUM GLUCONATE 27 MG(500)
500 TABLET ORAL 2 TIMES DAILY
Qty: 60 TABLET | Refills: 0 | Status: SHIPPED | OUTPATIENT
Start: 2025-05-28 | End: 2025-05-29 | Stop reason: SDUPTHER

## 2025-05-28 RX ORDER — LORAZEPAM 1 MG/1
2 TABLET ORAL ONCE
Status: COMPLETED | OUTPATIENT
Start: 2025-05-28 | End: 2025-05-28

## 2025-05-28 RX ORDER — VITS A,C,E/LUTEIN/MINERALS 300MCG-200
TABLET ORAL
Refills: 0 | Status: CANCELLED | OUTPATIENT
Start: 2025-05-28

## 2025-05-28 RX ADMIN — LORAZEPAM 2 MG: 1 TABLET ORAL at 01:01

## 2025-05-28 RX ADMIN — ANTACID TABLETS 1000 MG: 500 TABLET, CHEWABLE ORAL at 09:03

## 2025-05-28 RX ADMIN — FOLIC ACID 1 MG: 1 TABLET ORAL at 09:03

## 2025-05-28 RX ADMIN — RANOLAZINE 500 MG: 500 TABLET, FILM COATED, EXTENDED RELEASE ORAL at 09:03

## 2025-05-28 RX ADMIN — DILTIAZEM HYDROCHLORIDE 120 MG: 60 CAPSULE, EXTENDED RELEASE ORAL at 09:05

## 2025-05-28 RX ADMIN — Medication 800 MG: at 09:03

## 2025-05-28 RX ADMIN — B-COMPLEX W/ C & FOLIC ACID TAB 1 TABLET: TAB at 09:04

## 2025-05-28 RX ADMIN — ATORVASTATIN CALCIUM 40 MG: 40 TABLET, FILM COATED ORAL at 09:03

## 2025-05-28 RX ADMIN — FLUTICASONE FUROATE AND VILANTEROL TRIFENATATE 1 PUFF: 200; 25 POWDER RESPIRATORY (INHALATION) at 09:05

## 2025-05-28 RX ADMIN — NICOTINE 1 PATCH: 21 PATCH, EXTENDED RELEASE TRANSDERMAL at 09:06

## 2025-05-28 RX ADMIN — VANCOMYCIN HYDROCHLORIDE 125 MG: 125 CAPSULE ORAL at 11:39

## 2025-05-28 RX ADMIN — MAGNESIUM SULFATE HEPTAHYDRATE 4 G: 40 INJECTION, SOLUTION INTRAVENOUS at 09:01

## 2025-05-28 RX ADMIN — PYRIDOXINE HCL TAB 50 MG 100 MG: 50 TAB at 09:03

## 2025-05-28 RX ADMIN — APIXABAN 5 MG: 5 TABLET, FILM COATED ORAL at 09:03

## 2025-05-28 RX ADMIN — ASPIRIN 81 MG: 81 TABLET, CHEWABLE ORAL at 09:04

## 2025-05-28 RX ADMIN — INSULIN LISPRO 1 UNITS: 100 INJECTION, SOLUTION INTRAVENOUS; SUBCUTANEOUS at 11:38

## 2025-05-28 RX ADMIN — VANCOMYCIN HYDROCHLORIDE 125 MG: 125 CAPSULE ORAL at 05:27

## 2025-05-28 RX ADMIN — FERROUS SULFATE TAB 325 MG (65 MG ELEMENTAL FE) 325 MG: 325 (65 FE) TAB at 09:03

## 2025-05-28 RX ADMIN — THIAMINE HCL TAB 100 MG 100 MG: 100 TAB at 09:04

## 2025-05-28 RX ADMIN — Medication 250 MG: at 09:03

## 2025-05-28 RX ADMIN — GABAPENTIN 300 MG: 300 CAPSULE ORAL at 09:03

## 2025-05-28 RX ADMIN — METOPROLOL SUCCINATE 150 MG: 100 TABLET, EXTENDED RELEASE ORAL at 09:03

## 2025-05-28 RX ADMIN — PANTOPRAZOLE SODIUM 40 MG: 40 TABLET, DELAYED RELEASE ORAL at 09:03

## 2025-05-28 NOTE — ASSESSMENT & PLAN NOTE
Wt Readings from Last 3 Encounters:   05/28/25 76 kg (167 lb 8.8 oz)   05/13/25 79.1 kg (174 lb 4.8 oz)   05/08/25 86 kg (189 lb 9.5 oz)     Chronic - appears euvolemic on admission  Echo (2/2025): EF 50 to 54%, wall motion could not be accurately assessed.  Indeterminate diastolic function.  RV cavity normal with reduced systolic function and abnormal tricuspid annular plane systolic excursion less than 1.7 cm.  LA severely dilated.  RA pressure estimated greater than 15 mmHg.  Estimated pulmonary artery systolic pressure is 63.9 mmHg.    Plan:  I/Os  Low sodium diet  Daily weights   Gentle IVF

## 2025-05-28 NOTE — ASSESSMENT & PLAN NOTE
>>ASSESSMENT AND PLAN FOR COPD, SEVERITY TO BE DETERMINED (HCC) WRITTEN ON 5/28/2025  6:20 AM BY PIYUSH PEREZ, DO    Chronic, not following with pulmonology  Home inhalers include albuterol and Breo  Patient noting that he had not been compliant with home medications or inhalers   Decreased breath sounds bilaterally with intermittent wheezing at bases    Plan:  Resume home inhalers  PRN Xopenex in setting of afib

## 2025-05-28 NOTE — PHYSICAL THERAPY NOTE
PT TREATMENT     05/28/25 1250   PT Last Visit   PT Visit Date 05/28/25   Note Type   Note Type Treatment   Pain Assessment   Pain Assessment Tool 0-10   Pain Score No Pain   Restrictions/Precautions   Other Precautions Contact/isolation;Fall Risk   General   Chart Reviewed Yes   Family/Caregiver Present No   Cognition   Arousal/Participation Cooperative   Subjective   Subjective patient states wanting to join the [facility] gym and start walking without a walker   Bed Mobility   Supine to Sit 7  Independent   Sit to Supine 7  Independent   Transfers   Sit to Stand 5  Supervision   Stand to Sit 5  Supervision   Ambulation/Elevation   Gait Assistance 5  Supervision   Additional items Verbal cues   Assistive Device Rolling walker   Distance 200 feet with several changes in direction, R foot drag at times with dorsiflexor weakness   Balance   Static Sitting Fair +   Dynamic Sitting Fair +   Static Standing Fair   Dynamic Standing Fair -   Ambulatory Fair -   Exercises   Hip Flexion Sitting;5 reps;Bilateral  (Alternating)   Knee AROM Long Arc Quad Sitting;10 reps;Bilateral  (Alternating)   Ankle Pumps Sitting;10 reps;Bilateral   Marching Standing;10 reps;Bilateral  (Alternating)   Balance training  Sidestepping and backward stepping completed for balance and coordination   Assessment   Assessment Patient cooperative and demonstrating gait ability with roller walker.  Patient states motivation to join the health and wellness/gym setting upon discharge and encouraged to follow through with outpatient services.  Patient will benefit from continued continued physical therapy with progression as tolerated and increasing functional mobility with clinical staff as well.     The patient's AM-PAC Basic Mobility Inpatient Short Form Raw Score is 22. A Raw score of greater than 16 suggests the patient may benefit from discharge to home. Please also refer to the recommendation of the Physical Therapist for safe  discharge planning.         Plan   Treatment/Interventions Functional transfer training;LE strengthening/ROM;Therapeutic exercise;Endurance training;Patient/family training;Equipment eval/education;Gait training;Compensatory technique education   PT Frequency 3-5x/wk   Discharge Recommendation   Rehab Resource Intensity Level, PT III (Minimum Resource Intensity)   AM-PAC Basic Mobility Inpatient   Turning in Flat Bed Without Bedrails 4   Lying on Back to Sitting on Edge of Flat Bed Without Bedrails 4   Moving Bed to Chair 4   Standing Up From Chair Using Arms 4   Walk in Room 4   Climb 3-5 Stairs With Railing 2   Basic Mobility Inpatient Raw Score 22   Basic Mobility Standardized Score 47.4   [facility] Highest Level Of Mobility   JH-HLM Goal 7: Walk 25 feet or more   JH-HLM Achieved 7: Walk 25 feet or more   Education   Patient Reinforcement needed   Licensure   [place] License Number  Heather Dacia PT 4 [id number]

## 2025-05-28 NOTE — TELEPHONE ENCOUNTER
Patients pharmacy called in with questions in regards to a script the patient was written. Writer was unable to find the provider or the prescription in the chart. Pharmacist stated he may have another way to locate the information he needs,if it doesn't work he will call back.

## 2025-05-28 NOTE — CASE MANAGEMENT
Case Management Discharge Planning Note    Patient name Gregg Partida  Location [address] [place] [address]/[address] [place] [address]-* MRN [id number]  : 1960 Date 2025       Current Admission Date: 2025  Current Admission Diagnosis:Alcohol withdrawal ([facility])   Patient Active Problem List    Diagnosis Date Noted    QT prolongation 2025    Abnormal CT scan, cervical spine 2025    Abnormal CXR 2025    Alcohol withdrawal ([facility]) 2025    Recurrent Clostridioides difficile diarrhea 2025    Abnormal EKG 2025    Alcohol abuse 2025    Electrolyte abnormality 2025    Hypertension 2025    CAD (coronary artery disease) 2025    A-fib ([facility]) 2025    COPD (chronic obstructive pulmonary disease) ([facility]) 2025    Pancytopenia ([facility]) 2025    Fall 2025    Fall 2025    Aspiration pneumonia of both lower lobes ([facility]) 2025    Alcohol use disorder 2025    CAD (coronary artery disease) 2025    A-fib ([facility]) 2025    COPD (chronic obstructive pulmonary disease) ([facility]) 2025    Tobacco abuse 2025    CKD (chronic kidney disease) stage 2, GFR 60-89 ml/min 2025    Heart failure with preserved ejection fraction ([facility]) 2025    SOB (shortness of breath) 2025    Wound of right buttock 2025    Type 2 diabetes mellitus, without long-term current use of insulin ([facility]) 2025    Abnormal LFTs 2025    Hypomagnesemia 2025    Open wound of buttock 2025    Fracture of multiple ribs 2025    Atrial fibrillation with RVR ([facility]) 2025    Dyspnea on exertion 2025    Smoking 2024    Head injury 2024    Hypotension 2024    Mucus plugging of bronchi 2024    Hyponatremia 2024    Closed fracture of one rib of left side 2024    Ambulatory dysfunction 2024    Financial insecurity 2024    BPH (benign prostatic hyperplasia) 2024    Chronic alcohol use  09/14/2024    Hypertension 03/25/2024    Hemorrhagic cystitis 03/25/2024    Hypothyroidism 01/24/2024    Gastrointestinal hemorrhage with melena 10/29/2023    Chest pain 09/21/2023    Longstanding persistent atrial fibrillation (HCC) 09/21/2023    GERD (gastroesophageal reflux disease) 09/21/2023    Stable angina (HCC) 11/17/2022    Stage 3a chronic kidney disease (HCC) 06/09/2022    Fatty liver, alcoholic 04/15/2022    Nonischemic nontraumatic myocardial injury 04/10/2022    Mild protein-calorie malnutrition (HCC) 07/23/2021    Prostate cancer (HCC) 07/23/2021    Atrial fibrillation ([facility]) 12/05/2020    Chronic heart failure with preserved ejection fraction ([facility]) 12/22/2019    Noncompliance 12/22/2019    ETOH abuse 11/23/2019    SIRS (systemic inflammatory response syndrome) ([facility]) 11/23/2019    Generalized weakness 10/17/2019    Cervical spinal stenosis 10/17/2019    Alcohol withdrawal ([facility]) 06/07/2019    History of prostate cancer 06/07/2019    CAD (coronary artery disease) 06/07/2019    Nicotine abuse 06/07/2019    Depression, recurrent ([facility]) 10/10/2018    Hx of CABG 10/10/2018    Dyslipidemia 10/10/2018    COPD, severity to be determined ([facility]) 10/09/2018    Type 2 diabetes mellitus with chronic kidney disease ([facility]) 10/09/2018    Hypertensive heart and chronic kidney disease with heart failure and stage 1 through stage 4 chronic kidney disease, or chronic kidney disease ([facility]) 10/09/2018      LOS (days): 3  Geometric Mean LOS (GMLOS) (days):   Days to GMLOS:     OBJECTIVE:  Risk of Unplanned Readmission Score: 89     Current admission status: Inpatient   Preferred Pharmacy:   [email or web address] [address] [address]  Phone: [phone] Fax: [phone]    UNKNOWN - FOLLOW UP PRIOR TO DISCHARGE TO E-PRESCRIBE  No address on file      Primary Care Provider: Lilliana Bliss MD    Primary Insurance: [facility] REP  Secondary Insurance:     DISCHARGE  DETAILS:      CM met with patient bedside to introduce self/role and confirm if [facility] met with patient. Patient confirmed someone did meet with him and he plans to go to an OP program. CM asked patient if he is able to ambulate with no issue and patient stated yes and that he is comfortable with a LYFT home. CM reviewed IMM with patient.     IMM Given (Date):: 05/28/25 (IMM was reviewed at bedside with patient. Patient verbalized understanding and CM initialed for patient. Copy was placed in scan bin for patient's chart.)  IMM Given to:: Patient     IMM reviewed with patient, patient agrees with discharge determination.

## 2025-05-28 NOTE — DISCHARGE INSTR - OTHER ORDERS
Wound Care Plan:    1-Apply VASHE soaked gauze to left lateral forearm wound, right lateral lower leg wound, bilateral knee wounds x 5 minutes and pat dry. Apply thin layer of Normlgel to wounds, Adaptic cut to size of wounds, ABD, rolled gauze and tape. Change 3x/week and as needed for soilage/dislodgement.  2-Cleanse wound to right forehead with mild soap and water & pat dry. Apply 3M No Sting daily for protection.  3-Apply Prevent barrier cream or equivalent to bilateral sacrum, buttock and heels 2x/day and as needed.  4-Float heels on 2 pillows in bed to offload pressure so heels are not in contact with mattress or pillows.  5-Use pressure redistribution cushion in chair when out of bed if able. Avoid prolonged sitting.  6-Moisturize skin daily with skin nourishing cream.  7-Turn/reposition every 2 hours in bed and every hour when out of bed to chair for pressure re-distribution on skin.   8-You will receive a call to follow up at [facility].

## 2025-05-28 NOTE — PLAN OF CARE
Problem: Potential for Falls  Goal: Patient will remain free of falls  Description: INTERVENTIONS:  - Educate patient/family on patient safety including physical limitations  - Instruct patient to call for assistance with activity   - Consider consulting OT/PT to assist with strengthening/mobility based on AM PAC & JH-HLM score  - Consult OT/PT to assist with strengthening/mobility   - Keep Call bell within reach  - Keep bed low and locked with side rails adjusted as appropriate  - Keep care items and personal belongings within reach  - Initiate and maintain comfort rounds  - Make Fall Risk Sign visible to staff  - Offer Toileting every 2 Hours, in advance of need  - Initiate/Maintain bed/chair alarm  - Obtain necessary fall risk management equipment: bracelet/socks   - Apply yellow socks and bracelet for high fall risk patients  - Consider moving patient to room near nurses station  Outcome: Progressing

## 2025-05-28 NOTE — PLAN OF CARE
Problem: Potential for Falls  Goal: Patient will remain free of falls  Description: INTERVENTIONS:  - Educate patient/family on patient safety including physical limitations  - Instruct patient to call for assistance with activity   - Consider consulting OT/PT to assist with strengthening/mobility based on AM PAC & JH-HLM score  - Consult OT/PT to assist with strengthening/mobility   - Keep Call bell within reach  - Keep bed low and locked with side rails adjusted as appropriate  - Keep care items and personal belongings within reach  - Initiate and maintain comfort rounds  - Make Fall Risk Sign visible to staff  - Offer Toileting every 2 Hours, in advance of need  - Initiate/Maintain bed/chair alarm  - Obtain necessary fall risk management equipment: bracelet/socks   - Apply yellow socks and bracelet for high fall risk patients  - Consider moving patient to room near nurses station  5/27/2025 2235 by Mariama Beckford RN  Outcome: Progressing  5/27/2025 2233 by Mariama Beckford RN  Outcome: Progressing     Problem: PAIN - ADULT  Goal: Verbalizes/displays adequate comfort level or baseline comfort level  Description: Interventions:  - Encourage patient to monitor pain and request assistance  - Assess pain using appropriate pain scale  - Administer analgesics as ordered based on type and severity of pain and evaluate response  - Implement non-pharmacological measures as appropriate and evaluate response  - Consider cultural and social influences on pain and pain management  - Notify physician/advanced practitioner if interventions unsuccessful or patient reports new pain  - Educate patient/family on pain management process including their role and importance of  reporting pain   - Provide non-pharmacologic/complimentary pain relief interventions  5/27/2025 2235 by Mariama Beckford RN  Outcome: Progressing  5/27/2025 2233 by Mariama Beckford RN  Outcome: Progressing     Problem: INFECTION - ADULT  Goal: Absence or  prevention of progression during hospitalization  Description: INTERVENTIONS:  - Assess and monitor for signs and symptoms of infection  - Monitor lab/diagnostic results  - Monitor all insertion sites, i.e. indwelling lines, tubes, and drains  - Monitor endotracheal if appropriate and nasal secretions for changes in amount and color  - Vaughn appropriate cooling/warming therapies per order  - Administer medications as ordered  - Instruct and encourage patient and family to use good hand hygiene technique  - Identify and instruct in appropriate isolation precautions for identified infection/condition  5/27/2025 2235 by Mariama Beckford RN  Outcome: Progressing  5/27/2025 2233 by Mariama Beckford RN  Outcome: Progressing  Goal: Absence of fever/infection during neutropenic period  Description: INTERVENTIONS:  - Monitor WBC  - Perform strict hand hygiene  - Limit to healthy visitors only  - No plants, dried, fresh or silk flowers with turpin in patient room  5/27/2025 2235 by Mariama Beckford RN  Outcome: Progressing  5/27/2025 2233 by Mariama Beckford RN  Outcome: Progressing     Problem: SAFETY ADULT  Goal: Patient will remain free of falls  Description: INTERVENTIONS:  - Educate patient/family on patient safety including physical limitations  - Instruct patient to call for assistance with activity   - Consider consulting OT/PT to assist with strengthening/mobility based on AM PAC & JH-HLM score  - Consult OT/PT to assist with strengthening/mobility   - Keep Call bell within reach  - Keep bed low and locked with side rails adjusted as appropriate  - Keep care items and personal belongings within reach  - Initiate and maintain comfort rounds  - Make Fall Risk Sign visible to staff  - Offer Toileting every 2 Hours, in advance of need  - Initiate/Maintain bed/chair alarm  - Obtain necessary fall risk management equipment: bracelet/socks   - Apply yellow socks and bracelet for high fall risk patients  - Consider moving  patient to room near nurses station  5/27/2025 2235 by Mariama Beckford RN  Outcome: Progressing  5/27/2025 2233 by Mariama Beckford RN  Outcome: Progressing  Goal: Maintain or return to baseline ADL function  Description: INTERVENTIONS:  -  Assess patient's ability to carry out ADLs; assess patient's baseline for ADL function and identify physical deficits which impact ability to perform ADLs (bathing, care of mouth/teeth, toileting, grooming, dressing, etc.)  - Assess/evaluate cause of self-care deficits   - Assess range of motion  - Assess patient's mobility; develop plan if impaired  - Assess patient's need for assistive devices and provide as appropriate  - Encourage maximum independence but intervene and supervise when necessary  - Involve family in performance of ADLs  - Assess for home care needs following discharge   - Consider OT consult to assist with ADL evaluation and planning for discharge  - Provide patient education as appropriate  - Monitor functional capacity and physical performance, use of AM PAC & JH-HLM   - Monitor gait, balance and fatigue with ambulation    5/27/2025 2235 by Mariama Beckford RN  Outcome: Progressing  5/27/2025 2233 by Mariama Beckford RN  Outcome: Progressing  Goal: Maintains/Returns to pre admission functional level  Description: INTERVENTIONS:  - Perform AM-PAC 6 Click Basic Mobility/ Daily Activity assessment daily.  - Set and communicate daily mobility goal to care team and patient/family/caregiver.   - Collaborate with rehabilitation services on mobility goals if consulted  - Perform Range of Motion 12 times a day.  - Reposition patient every 2 hours.  - Dangle patient 3 times a day  - Stand patient 3 times a day  - Ambulate patient 3 times a day  - Out of bed to chair 3 times a day   - Out of bed for meals 3 times a day  - Out of bed for toileting  - Record patient progress and toleration of activity level   5/27/2025 2235 by Mariama Beckford RN  Outcome:  Progressing  5/27/2025 2233 by Mariama Beckford RN  Outcome: Progressing     Problem: DISCHARGE PLANNING  Goal: Discharge to home or other facility with appropriate resources  Description: INTERVENTIONS:  - Identify barriers to discharge w/patient and caregiver  - Arrange for needed discharge resources and transportation as appropriate  - Identify discharge learning needs (meds, wound care, etc.)  - Arrange for interpretive services to assist at discharge as needed  - Refer to Case Management Department for coordinating discharge planning if the patient needs post-hospital services based on physician/advanced practitioner order or complex needs related to functional status, cognitive ability, or social support system  5/27/2025 2235 by Mariama Beckford RN  Outcome: Progressing  5/27/2025 2233 by Mariama Beckford RN  Outcome: Progressing     Problem: Knowledge Deficit  Goal: Patient/family/caregiver demonstrates understanding of disease process, treatment plan, medications, and discharge instructions  Description: Complete learning assessment and assess knowledge base.  Interventions:  - Provide teaching at level of understanding  - Provide teaching via preferred learning methods  5/27/2025 2235 by Mariama Beckford RN  Outcome: Progressing  5/27/2025 2233 by Mariama Beckford RN  Outcome: Progressing     Problem: Nutrition/Hydration-ADULT  Goal: Nutrient/Hydration intake appropriate for improving, restoring or maintaining nutritional needs  Description: Monitor and assess patient's nutrition/hydration status for malnutrition. Collaborate with interdisciplinary team and initiate plan and interventions as ordered.  Monitor patient's weight and dietary intake as ordered or per policy. Utilize nutrition screening tool and intervene as necessary. Determine patient's food preferences and provide high-protein, high-caloric foods as appropriate.     INTERVENTIONS:  - Monitor oral intake, urinary output, labs, and treatment  plans  - Assess nutrition and hydration status and recommend course of action  - Evaluate amount of meals eaten  - Assist patient with eating if necessary   - Allow adequate time for meals  - Recommend/ encourage appropriate diets, oral nutritional supplements, and vitamin/mineral supplements  - Order, calculate, and assess calorie counts as needed  - Recommend, monitor, and adjust tube feedings and TPN/PPN based on assessed needs  - Assess need for intravenous fluids  - Provide specific nutrition/hydration education as appropriate  - Include patient/family/caregiver in decisions related to nutrition  5/27/2025 2235 by Mariama Beckford, RN  Outcome: Progressing  5/27/2025 2233 by Mariama Beckford, RN  Outcome: Progressing     Problem: Prexisting or High Potential for Compromised Skin Integrity  Goal: Skin integrity is maintained or improved  Description: INTERVENTIONS:  - Identify patients at risk for skin breakdown  - Assess and monitor skin integrity including under and around medical devices   - Assess and monitor nutrition and hydration status  - Monitor labs  - Assess for incontinence   - Turn and reposition patient  - Assist with mobility/ambulation  - Relieve pressure over eduardo prominences   - Avoid friction and shearing  - Provide appropriate hygiene as needed including keeping skin clean and dry  - Evaluate need for skin moisturizer/barrier cream  - Collaborate with interdisciplinary team  - Patient/family teaching  - Consider wound care consult    Assess:  - Review Herrera scale daily  - Clean and moisturize skin every day  - Inspect skin when repositioning, toileting, and assisting with ADLS  - Assess extremities for adequate circulation and sensation     Bed Management:  - Have minimal linens on bed & keep smooth, unwrinkled  - Change linens as needed when moist or perspiring  - Avoid sitting or lying in one position for more than 2 hours while in bed?Keep HOB at 45degrees   - Toileting:  - Offer bedside  commode  - Assess for incontinence every hour    Activity:  - Mobilize patient 12 times a day  - Encourage activity and walks on unit  - Encourage or provide ROM exercises   - Turn and reposition patient every 2 Hours  - Use appropriate equipment to lift or move patient in bed  - Instruct/ Assist with weight shifting every hour when out of bed in chair  - Consider limitation of chair time 2 hour intervals    Skin Care:  - Avoid use of baby powder, tape, friction and shearing, hot water or constrictive clothing  - Relieve pressure over bony prominences using pillows  - Do not massage red bony areas    5/27/2025 2235 by Mariama Beckford RN  Outcome: Progressing  5/27/2025 2233 by Mariama Beckford RN  Outcome: Progressing     Problem: RESPIRATORY - ADULT  Goal: Achieves optimal ventilation and oxygenation  Description: INTERVENTIONS:  - Assess for changes in respiratory status  - Assess for changes in mentation and behavior  - Position to facilitate oxygenation and minimize respiratory effort  - Oxygen administered by appropriate delivery if ordered  - Initiate smoking cessation education as indicated  - Encourage broncho-pulmonary hygiene including cough, deep breathe, Incentive Spirometry  - Assess the need for suctioning and aspirate as needed  - Assess and instruct to report SOB or any respiratory difficulty  - Respiratory Therapy support as indicated  5/27/2025 2235 by Mariama Beckford RN  Outcome: Progressing  5/27/2025 2233 by Mariama Beckford RN  Outcome: Progressing

## 2025-05-28 NOTE — NURSING NOTE
Discharge to home via wheelchair, accompanied by PCA to the lobby into the car. Pt  ordered [employer] services arranged by CM. AVS reviewed, written instructions given to patient, patient verbalized understanding,  E-prescriptions given, IV removed per protocol, all belongings taken.

## 2025-05-28 NOTE — ASSESSMENT & PLAN NOTE
>>ASSESSMENT AND PLAN FOR ABNORMAL LFTS WRITTEN ON 5/28/2025  6:20 AM BY PIYUSH PEREZ,     AST 99, ALT 47, alk phos 155  Continue to monitor

## 2025-05-28 NOTE — ASSESSMENT & PLAN NOTE
>>ASSESSMENT AND PLAN FOR TYPE 2 DIABETES MELLITUS WITH CHRONIC KIDNEY DISEASE (HCC) WRITTEN ON 5/28/2025  6:20 AM BY PIYUSH PEREZ DO    Lab Results   Component Value Date    HGBA1C 5.2 05/05/2025       Recent Labs     05/27/25  0724 05/27/25  1142 05/27/25  1521 05/27/25 2041   POCGLU 136 151* 156* 142*       Blood Sugar Average: Last 72 hrs:  (P) [id number]  Chronic, well-controlled  Medications include metformin 500 mg twice daily  Patient noting that he had not been taking his medications or eating regularly for several days  Hypoglycemia noted on admission, improving with p.o. intake and IVF with D5    Plan:  Hold home metformin  Insulin sliding scale  Carb controlled diet  Hypoglycemia protocol

## 2025-05-28 NOTE — PROGRESS NOTES
Progress Note - Family Medicine   Name: Gregg Partida 65 y.o. male I MRN: [id number]  Unit/Bed#: 3 [address] I Date of Admission: 2025   Date of Service: 2025 I Hospital Day: 3  { ?Quick Links I Problem List I PORCH I Billing Tip:31427}  Assessment & Plan  Alcohol withdrawal (HCC)  Chronic -history of alcohol abuse with recurrent admissions for alcohol withdrawal  Stating last drink was on a.m. of presentation  Noting some fine tremors on examination especially when trying to complete task like eating. Endorsing that he has not been taking his medications including naltrexone or vitamins for at least a week.  Additionally noting that he has not been eating regularly for several days    ED course: 1 g Rocephin, 10 mg Valium IV, 2 g mag sulfate, D5 25 g and 1 L normal saline bolus, DuoNeb x 1  B12 wnl  Folate wnl     Patient interested in outpatient Alcohol  rehab  CIWA score 11     Plan:  CIWA protocol  Case management consulted, appreciate reccs  Recommend home vs. Outpatient rehab  Continue thiamine, folic acid, B6, multivitamin supplementation  Seizure precautions, Aspiration precautions, Fall precautions  Monitor mentation  Telemetry   SIRS (systemic inflammatory response syndrome) (HCC)  As evidenced by tachycardia, tachypnea -query source of infection  WBC WNL, Pro-Vlad WNL x 1  UA: Light orange, high specific gravity, protein, ketones, 0 below tension, bilirubin, occult blood  History of recurrent admissions for similar concerns  Last CXR with pneumonitis versus pneumonia in right lobe  ED course: Azithromycin x 1, Rocephin x 1  Previously given vancomycin p.o. for C. Difficile, patient endorses that he had not been taking  Denies current diarrhea  IVF with D5  CXR: No acute cardiopulmonary disease.   Proca.09 < 0.09    Plan:    Hold off Abx  If fever, or constitutional sxs., consider restarting  Recurrent Clostridioides difficile diarrhea  Previous history of recurrent C. Difficile  Started  on p.o. vancomycin taper during previous hospitalization on 5/8. Patient reporting that he had not been compliant with vancomycin taper  Cdif toxin by PCR: (+)  Cdif toxins A+B: (+)    Resumed during hospitalization  Dc'ed that   Continue PO Vanc 125 mg Q6   Strict contact and hand hygiene  Monitor stool output   Acute respiratory failure with hypoxia (HCC)  Presented to ED with hypoxia 84 to 88% on room air  Requiring 2 to 4 L nasal cannula on admission  Tolerating titration down on O2  Denying cough or feeling short of breath  History of COPD, noncompliant with home medications    ED course: Rocephin x 1, azithromycin times  CXR: No acute cardiopulmonary disease.     Tolerating RA    Plan:  Titrate O2 to keep SPO2 greater than 88%  Weaned off O2   Resume home inhalers  Incentive spirometry  Rest of plan per SIRS  Acute kidney injury superimposed on chronic kidney disease  (HCC)  Creatinine 1.39 on admission, baseline Cr 0.8-1  Patient reporting that he had not been eating for several days, presented with EtOH withdrawal  UA: 1+ ketones, small blood, 2+ protein, small bili, 4.0 urobilinogen, 2-4 hyaline casts, 0-3 granular casts     Labs: Cr 1.13 > 0.90    Plan:  S/p gentle IV rehydration w/ 50 mL/hr IV LR  Monitor I's and O's  Urinary retention protocol  Ordered PVR  Continue home Flomax  COPD, severity to be determined (HCC)  Chronic, not following with pulmonology  Home inhalers include albuterol and Breo  Patient noting that he had not been compliant with home medications or inhalers   Decreased breath sounds bilaterally with intermittent wheezing at bases    Plan:  Resume home inhalers  PRN Xopenex in setting of afib   Type 2 diabetes mellitus with chronic kidney disease (HCC)  Lab Results   Component Value Date    HGBA1C 5.2 05/05/2025       Recent Labs     05/27/25  0724 05/27/25  1142 05/27/25  1521 05/27/25  2041   POCGLU 136 151* 156* 142*       Blood Sugar Average: Last 72 hrs:  (P)  [id number]  Chronic, well-controlled  Medications include metformin 500 mg twice daily  Patient noting that he had not been taking his medications or eating regularly for several days  Hypoglycemia noted on admission, improving with p.o. intake and IVF with D5    Plan:  Hold home metformin  Insulin sliding scale  Carb controlled diet  Hypoglycemia protocol  Dyslipidemia  Chronic, last lipid panel (11/24): Total cholesterol 153, , LDL 87, HDL 46.  Home regimen: Atorvastatin 40 mg.    Continue home Lipitor  History of prostate cancer  Stable, history of prostate cancer in 2020 s/p resection 2021.  Home regimen: Flomax 0.4 mg daily  Noting that he had not been taking his home medications    Continue home regimen  Monitor I's and O's  CAD (coronary artery disease)  H/O CAD s/p CABG.  Home regimen: ASA 81 mg, ranolazine 500 mg every 12 hours  Noting that he not been taking his medications    Continue home regimen  Nicotine abuse  Chronic, stable.  Continues to smoke daily.    Ordered nicotine 21 mg patch  Encourage smoking cessation  Atrial fibrillation (HCC)  Chronic, noted in ED on EKG  Rate on admission less than 100  Medications include Eliquis 5 mg twice daily, ASA 81 mg daily, Cardizem 100 mg twice daily, Toprol  mg  Noting that he not take his medications at home    Patient in Afib on Tele. HR avg 80s.     Plan:  Resume home medications  Monitor on telemetry  Monitor rate control  Chronic heart failure with preserved ejection fraction (HCC)  Wt Readings from Last 3 Encounters:   05/28/25 76 kg (167 lb 8.8 oz)   05/13/25 79.1 kg (174 lb 4.8 oz)   05/08/25 86 kg (189 lb 9.5 oz)     Chronic - appears euvolemic on admission  Echo (2/2025): EF 50 to 54%, wall motion could not be accurately assessed.  Indeterminate diastolic function.  RV cavity normal with reduced systolic function and abnormal tricuspid annular plane systolic excursion less than 1.7 cm.  LA severely dilated.  RA pressure  estimated greater than 15 mmHg.  Estimated pulmonary artery systolic pressure is 63.9 mmHg.    Plan:  I/Os  Low sodium diet  Daily weights   Gentle IVF   QT prolongation  On admission EKG QT C interval 483  Admission mag 1.3    Plan:  Monitoring on telemetry  Replete electrolytes as indicated   Avoid QT prolonging medications  Ambulatory dysfunction  Chronic, noting multiple falls at home  Patient reporting that he lives alone. History of alcohol abuse    PT OT ordered  Fall precautions  GERD (gastroesophageal reflux disease)  Chronic, stable.  Home regimen: Protonix 40 mg, Maalox 30 mL every 4 as needed, aluminum magnesium hydroxide 5 mL every 6 hours as needed.    Resume home medication   Hypertension  Chronic has a history of hypo and hypertension  Presented to ED with hypotension    Status post IVF, BPs generally normalized  Continue to monitor  Electrolyte abnormality  Mag 1.3 on admission  Monitor and replete electrolytes as indicated  Abnormal LFTs  AST 99, ALT 47, alk phos 155  Continue to monitor    VTE Pharmacologic Prophylaxis: VTE Score: 7 {VTE Risk:62366}    Mobility:   Basic Mobility Inpatient Raw Score: 18  JH-HLM Goal: 6: Walk 10 steps or more  JH-HLM Achieved: 1: Laying in bed  JH-HLM Goal NOT achieved. Continue with multidisciplinary rounding and encourage appropriate mobility to improve upon JH-HLM goals.    Patient Centered Rounds: I performed bedside rounds with nursing staff today.   Discussions with Specialists or Other Care Team Provider: ***    Education and Discussions with Family / Patient: Patient declined call to .     Current Length of Stay: 3 day(s)  Current Patient Status: Inpatient   Certification Statement: The patient will continue to require additional inpatient hospital stay due to alcohol withdrawal  Discharge Plan: Anticipate discharge in 24-48 hrs to home.    Code Status: Level 1 - Full Code    Subjective   Patient was evaluated at bedside and found to be stable.  Overnight, the patient had no events. Patient states ***  Patient admits ***  Patient denies ***     Objective :{?Quick Links I ICU Summary I Vitals I I/Os I LDAs I Mobility (PT/OT) I Code Status / ACP   ?Quick Links I Active Meds I Pain Meds I Antibiotics I Anticoagulants:14813}  Temp:  [97.7 °F (36.5 °C)-99.3 °F (37.4 °C)] 97.7 °F (36.5 °C)  HR:  [] 88  BP: (104-139)/(62-90) 138/90  Resp:  [16-20] 16  SpO2:  [94 %-98 %] 98 %  O2 Device: None (Room air)    Body mass index is 21.51 kg/m².     Input and Output Summary (last 24 hours):     Intake/Output Summary (Last 24 hours) at 5/28/2025 0619  Last data filed at 5/28/2025 0009  Gross per 24 hour   Intake --   Output 900 ml   Net -900 ml       Physical Exam  Constitutional:       General: He is not in acute distress.     Appearance: Normal appearance. He is normal weight. He is not ill-appearing or toxic-appearing.   HENT:      Head: Normocephalic and atraumatic.      Right Ear: Tympanic membrane normal.      Left Ear: Tympanic membrane normal.      Nose: Nose normal.      Mouth/Throat:      Mouth: Mucous membranes are moist.      Pharynx: Oropharynx is clear.     Eyes:      Conjunctiva/sclera: Conjunctivae normal.      Pupils: Pupils are equal, round, and reactive to light.       Cardiovascular:      Rate and Rhythm: Normal rate and regular rhythm.      Pulses: Normal pulses.      Heart sounds: Normal heart sounds.   Pulmonary:      Effort: Pulmonary effort is normal. No respiratory distress.      Breath sounds: Normal breath sounds.   Abdominal:      General: Abdomen is flat. Bowel sounds are normal.      Palpations: Abdomen is soft.     Musculoskeletal:         General: Normal range of motion.      Cervical back: Normal range of motion and neck supple.     Skin:     General: Skin is warm and dry.     Neurological:      General: No focal deficit present.      Mental Status: He is alert and oriented to person, place, and time. Mental status is at baseline.      Psychiatric:         Mood and Affect: Mood normal.         Behavior: Behavior normal.         Thought Content: Thought content normal.       ***    Lines/Drains:        Telemetry:  Telemetry Orders (From admission, onward)               24 Hour Telemetry Monitoring  Continuous x 24 Hours (Telem)        Expiring   Question:  Reason for 24 Hour Telemetry  Answer:  Arrhythmias requiring acute medical intervention / PPM or ICD malfunction                     Telemetry Reviewed: {IVETTE Tele Review:47035}  Indication for Continued Telemetry Use: {Tele Indications:85899}             {?Quick Links I Lab Review I Micro Results I Radiology I Cardiology:44390}  Lab Results: I have reviewed the following results:   Results from last 7 days   Lab Units 05/28/25  0343   WBC Thousand/uL 4.44   HEMOGLOBIN g/dL 9.1*   HEMATOCRIT % 27.9*   PLATELETS Thousands/uL 51*   SEGS PCT % 54   LYMPHO PCT % 26   MONO PCT % 15*   EOS PCT % 3     Results from last 7 days   Lab Units 05/28/25  0343 05/26/25  1840 05/25/25  0607   SODIUM mmol/L 131*   < > 141   POTASSIUM mmol/L 3.8   < > 3.8   CHLORIDE mmol/L 96   < > 101   CO2 mmol/L 23   < > 24   BUN mg/dL 13   < > 17   CREATININE mg/dL 1.01   < > 0.90   ANION GAP mmol/L 12   < > 16*   CALCIUM mg/dL 8.4   < > 7.5*   ALBUMIN g/dL  --   --  3.2*   TOTAL BILIRUBIN mg/dL  --   --  0.68   ALK PHOS U/L  --   --  145*   ALT U/L  --   --  39   AST U/L  --   --  63*   GLUCOSE RANDOM mg/dL 120   < > 82    < > = values in this interval not displayed.         Results from last 7 days   Lab Units 05/27/25  2041 05/27/25  1521 05/27/25  1142 05/27/25  0724 05/26/25  2102 05/26/25  1537 05/26/25  1104 05/26/25  0707 05/25/25  1959 05/25/25  1535 05/25/25  1113 05/25/25  0720   POC GLUCOSE mg/dl 142* 156* 151* 136 128 135 81 92 173* 142* 137 88         Results from last 7 days   Lab Units 05/25/25  0607 05/24/25  1614   PROCALCITONIN ng/ml 0.09 0.09       Recent Cultures (last 7 days):   Results from last 7  "days   Lab Units 05/25/25 1923   C DIFF TOXIN B BY PCR  Positive*       {Imaging Results Review:46058::\"No pertinent imaging studies reviewed.\"}  {Other Study Results Review:09186::\"No additional pertinent studies reviewed.\"}    Last 24 Hours Medication List:     Current Facility-Administered Medications:     acetaminophen (TYLENOL) tablet 650 mg, Q6H PRN    albuterol (PROVENTIL HFA,VENTOLIN HFA) inhaler 2 puff, Q4H PRN    aluminum-magnesium hydroxide-simethicone (MAALOX) oral suspension 30 mL, Q4H PRN    apixaban (ELIQUIS) tablet 5 mg, BID    aspirin chewable tablet 81 mg, Daily    atorvastatin (LIPITOR) tablet 40 mg, Daily    calcium carbonate (TUMS) chewable tablet 1,000 mg, Daily    diltiazem (CARDIZEM SR) 12 hr capsule 120 mg, BID    ferrous sulfate tablet 325 mg, Daily With Breakfast    fluticasone-vilanterol 200-25 mcg/actuation 1 puff, Daily    folic acid (FOLVITE) tablet 1 mg, Daily    gabapentin (NEURONTIN) capsule 300 mg, TID    insulin lispro (HumALOG/ADMELOG) 100 units/mL subcutaneous injection 1-5 Units, TID With Meals **AND** Fingerstick Glucose (POCT), 4x Daily AC and at bedtime    insulin lispro (HumALOG/ADMELOG) 100 units/mL subcutaneous injection 1-5 Units, HS    lactated ringers infusion, Continuous, Last Rate: 50 mL/hr (05/26/25 1910)    levalbuterol (XOPENEX) inhalation solution 0.63 mg, Q8H PRN    magnesium Oxide (MAG-OX) tablet 800 mg, BID    magnesium sulfate 4 g/100 mL IVPB (premix) 4 g, Once    melatonin tablet 3 mg, HS    metoprolol succinate (TOPROL-XL) 24 hr tablet 150 mg, Q12H SEDA    multivitamin stress formula tablet 1 tablet, Daily    nicotine (NICODERM CQ) 21 mg/24 hr TD 24 hr patch 1 patch, Daily    pantoprazole (PROTONIX) EC tablet 40 mg, Daily    pyridoxine (VITAMIN B6) tablet 100 mg, Daily    ranolazine (RANEXA) 12 hr tablet 500 mg, BID    saccharomyces boulardii (FLORASTOR) capsule 250 mg, BID    tamsulosin (FLOMAX) capsule 0.4 mg, Daily With Dinner    thiamine tablet 100 mg, " Daily    vancomycin (VANCOCIN) capsule 125 mg, Q6H [facility]    Administrative Statements   Today, Patient Was Seen By: Nicky Messer DO  {Time Spent (Optional):76332}

## 2025-05-28 NOTE — WOUND OSTOMY CARE
Progress Note - Wound   Gregg Partida 65 y.o. male MRN: [id number]  Unit/Bed#: [address] [place] [address] Encounter: [id number]        Assessment:   This is a 65 year old male patient admitted on 5/24/25 with alcohol withdrawal. Patient was AAO x 3 and agreeable to have wound visit done. He was turned and repositioned with assist of 1 person. Patient has episodes of urinary and fecal incontinence.    Assessment Findings:  1-Full thickness traumatic wounds to left lateral forearm, bilateral knees, right medial lower leg and right forehead all present on admission. Orders are in place for wound care.  2-Bilateral heels and sacrobuttocks are intact - orders are in place for skin care and for prevention.    Wound Care Plan:  1-Apply VASHE soaked gauze to left lateral forearm wound, right lateral lower leg wound, bilateral knee wounds x 5 minutes and pat dry. Apply thin layer of Normlgel to wounds, Adaptic cut to size of wounds, ABD, rolled gauze and tape. Change every other day & prn soilage/dislodgement.  2-Cleanse wound to right forehead with NSS & pat dry. Apply 3M No Sting daily for protection.  3-Apply Prevent barrier cream to bilateral sacrum, buttock and heels BID and PRN  4-Float heels on 2 pillows to offload pressure so heels are not in contact with mattress or pillows.  5-Ehob pressure redistribution cushion in chair when out of bed if able. Avoid prolonged sitting.  6-Moisturize skin daily with skin nourishing cream.  7-Turn/reposition q2h or when medically stable for pressure re-distribution on skin.     Wound 05/05/25 Leg Distal;Right;Lateral (Active)   Wound Image   05/27/25 1007   Wound Description Granulation tissue; Pink;Slough;Yellow 05/27/25 1007   Wound Length (cm) 0.3 cm 05/27/25 1007   Wound Width (cm) 3 cm 05/27/25 1007   Wound Depth (cm) 0.1 cm 05/27/25 1007   Wound Surface Area (cm^2) 0.71 cm^2 05/27/25 1007   Wound Volume (cm^3) 0.047 cm^3 05/27/25 1007   Calculated Wound Volume (cm^3) 0.09 cm^3 05/27/25  1007   Drainage Amount None 05/27/25 1007   Babita-wound Assessment Intact 05/27/25 1007   Dressing Normlgel;ABD;Dry dressing 05/27/25 1007   Dressing Changed New 05/27/25 1007   Dressing Status Clean;Dry;Intact 05/27/25 1007       Wound Knee Anterior;Left (Active)   Wound Image   05/27/25 1005   Wound Description Granulation tissue;Pink;Slough;Yellow;Eschar;Black 05/27/25 1005   Non-staged Wound Description Full thickness 05/27/25 1005   Wound Length (cm) 15.5 cm 05/27/25 1005   Wound Width (cm) 10 cm 05/27/25 1005   Wound Depth (cm) 0.1 cm 05/27/25 1005   Wound Surface Area (cm^2) 121.74 cm^2 05/27/25 1005   Wound Volume (cm^3) 8.116 cm^3 05/27/25 1005   Calculated Wound Volume (cm^3) 15.5 cm^3 05/27/25 1005   Drainage Amount Small 05/27/25 1005   Drainage Description Serosanguineous 05/27/25 1005   Babita-wound Assessment Intact 05/27/25 1005   Dressing Normlgel;ABD;Dry dressing 05/27/25 1005   Dressing Changed New 05/27/25 1005   Dressing Status Clean;Dry;Intact 05/27/25 1005       Wound 05/24/25 Knee Anterior;Right (Active)   Wound Image   05/27/25 1004   Wound Description Granulation tissue;Pink;Black;Eschar 05/27/25 1004   Wound Length (cm) 10 cm 05/27/25 1004   Wound Width (cm) 8 cm 05/27/25 1004   Wound Depth (cm) 0.1 cm 05/27/25 1004   Wound Surface Area (cm^2) 62.83 cm^2 05/27/25 1004   Wound Volume (cm^3) 4.189 cm^3 05/27/25 1004   Calculated Wound Volume (cm^3) 8 cm^3 05/27/25 1004   Drainage Amount Small 05/27/25 1004   Drainage Description Serosanguineous 05/27/25 1004   Babita-wound Assessment Intact 05/27/25 1004   Treatments Cleansed 05/27/25 1004   Dressing Normlgel;Adaptic;ABD;Dry dressing 05/27/25 1004   Dressing Changed New 05/27/25 1004   Dressing Status Clean;Dry;Intact 05/27/25 1004       Wound 05/26/25 Traumatic Skin Tear Arm Left;Lower;Lateral (Active)   Wound Image   05/27/25 1032   Wound Description Pink;Granulation tissue;Yellow;Slough 05/27/25 1032   Non-staged Wound Description Full  thickness 05/27/25 1032   Wound Length (cm) 13 cm 05/27/25 1032   Wound Width (cm) 3.5 cm 05/27/25 1032   Wound Depth (cm) 0.1 cm 05/27/25 1032   Wound Surface Area (cm^2) 35.74 cm^2 05/27/25 1032   Wound Volume (cm^3) 2.382 cm^3 05/27/25 1032   Calculated Wound Volume (cm^3) 4.55 cm^3 05/27/25 1032   Drainage Amount Moderate 05/27/25 1032   Drainage Description Serosanguineous 05/27/25 1032   Babita-wound Assessment Intact 05/27/25 1032   Treatments Cleansed 05/27/25 1032   Dressing Normlgel;ABD;Dry dressing 05/27/25 1032   Dressing Changed New 05/27/25 1032   Dressing Status Clean;Dry;Intact 05/27/25 1032       Wound 05/27/25 Traumatic Head Anterior (Active)   Wound Image   05/27/25 0953   Wound Description Dry;Brown;Eschar;Yellow;  Slough 05/27/25 0953   Non-staged Wound Description Full thickness 05/27/25 0953   Wound Length (cm) 1 cm 05/27/25 0953   Wound Width (cm) 1 cm 05/27/25 0953   Wound Depth (cm) 0.1 cm 05/27/25 0953   Wound Surface Area (cm^2) 0.79 cm^2 05/27/25 0953   Wound Volume (cm^3) 0.052 cm^3 05/27/25 0953   Calculated Wound Volume (cm^3) 0.1 cm^3 05/27/25 0953   Drainage Amount None 05/27/25 0953   Babita-wound Assessment Intact 05/27/25 0953   Treatments Cleansed 05/27/25 0953   Dressing 3M No Sting 05/27/25 0953   Dressing Changed New 05/27/25 0953   Dressing Status Intact 05/27/25 0953     Discussed assessment findings, and plan of care/recommendations with Lashon SORIANO.    Wound care will follow along with patient weekly, please call or text with questions and concerns.    Recommendations written as orders.  Iraida Galvan MSN, RN, CWON

## 2025-05-28 NOTE — ASSESSMENT & PLAN NOTE
Lab Results   Component Value Date    HGBA1C 5.2 05/05/2025       Recent Labs     05/27/25  0724 05/27/25  1142 05/27/25  1521 05/27/25 2041   POCGLU 136 151* 156* 142*       Blood Sugar Average: Last 72 hrs:  (P) [id number]  Chronic, well-controlled  Medications include metformin 500 mg twice daily  Patient noting that he had not been taking his medications or eating regularly for several days  Hypoglycemia noted on admission, improving with p.o. intake and IVF with D5    Plan:  Hold home metformin  Insulin sliding scale  Carb controlled diet  Hypoglycemia protocol

## 2025-05-29 ENCOUNTER — TELEPHONE (OUTPATIENT)
Age: 65
End: 2025-05-29

## 2025-05-29 RX ORDER — GABAPENTIN 300 MG/1
300 CAPSULE ORAL 3 TIMES DAILY
Qty: 90 CAPSULE | Refills: 1 | Status: ON HOLD | OUTPATIENT
Start: 2025-05-29 | End: 2025-06-12

## 2025-05-29 RX ORDER — SACCHAROMYCES BOULARDII 250 MG
250 CAPSULE ORAL 2 TIMES DAILY
Qty: 60 CAPSULE | Refills: 0 | Status: ON HOLD | OUTPATIENT
Start: 2025-05-29 | End: 2025-06-12

## 2025-05-29 RX ORDER — DILTIAZEM HYDROCHLORIDE 120 MG/1
120 CAPSULE, EXTENDED RELEASE ORAL 2 TIMES DAILY
Qty: 60 CAPSULE | Refills: 1 | Status: ON HOLD | OUTPATIENT
Start: 2025-05-29 | End: 2025-06-12

## 2025-05-29 RX ORDER — NALTREXONE HYDROCHLORIDE 50 MG/1
50 TABLET, FILM COATED ORAL DAILY
Qty: 30 TABLET | Refills: 0 | Status: SHIPPED | OUTPATIENT
Start: 2025-05-29 | End: 2025-06-12

## 2025-05-29 RX ORDER — FOLIC ACID 0.4 MG
400 TABLET ORAL DAILY
Qty: 30 TABLET | Refills: 1 | Status: ON HOLD | OUTPATIENT
Start: 2025-05-29 | End: 2025-06-12

## 2025-05-29 RX ORDER — FERROUS SULFATE 325(65) MG
325 TABLET ORAL
Qty: 30 TABLET | Refills: 1 | Status: ON HOLD | OUTPATIENT
Start: 2025-05-29 | End: 2025-06-12

## 2025-05-29 RX ORDER — SENNA AND DOCUSATE SODIUM 50; 8.6 MG/1; MG/1
1 TABLET, FILM COATED ORAL DAILY
Qty: 7 TABLET | Refills: 0 | Status: SHIPPED | OUTPATIENT
Start: 2025-05-29 | End: 2025-06-12

## 2025-05-29 RX ORDER — TAMSULOSIN HYDROCHLORIDE 0.4 MG/1
0.4 CAPSULE ORAL
Qty: 30 CAPSULE | Refills: 1 | Status: ON HOLD | OUTPATIENT
Start: 2025-05-29 | End: 2025-06-12

## 2025-05-29 RX ORDER — GAUZE BANDAGE 2" X 2"
100 BANDAGE TOPICAL DAILY
Qty: 30 TABLET | Refills: 1 | Status: ON HOLD | OUTPATIENT
Start: 2025-05-29 | End: 2025-06-12

## 2025-05-29 RX ORDER — ALBUTEROL SULFATE 90 UG/1
2 INHALANT RESPIRATORY (INHALATION) EVERY 6 HOURS PRN
Qty: 6.7 G | Refills: 1 | Status: ON HOLD | OUTPATIENT
Start: 2025-05-29 | End: 2025-06-12

## 2025-05-29 RX ORDER — ATORVASTATIN CALCIUM 40 MG/1
40 TABLET, FILM COATED ORAL DAILY
Qty: 30 TABLET | Refills: 1 | Status: ON HOLD | OUTPATIENT
Start: 2025-05-29 | End: 2025-06-12

## 2025-05-29 RX ORDER — METOPROLOL SUCCINATE 100 MG/1
100 TABLET, EXTENDED RELEASE ORAL DAILY
Qty: 30 TABLET | Refills: 1 | Status: ON HOLD | OUTPATIENT
Start: 2025-05-29 | End: 2025-06-12

## 2025-05-29 RX ORDER — PANTOPRAZOLE SODIUM 40 MG/1
40 TABLET, DELAYED RELEASE ORAL DAILY
Qty: 30 TABLET | Refills: 1 | Status: ON HOLD | OUTPATIENT
Start: 2025-05-29 | End: 2025-06-12

## 2025-05-29 RX ORDER — LIDOCAINE 50 MG/G
1 PATCH TOPICAL DAILY
Qty: 30 PATCH | Refills: 0 | Status: SHIPPED | OUTPATIENT
Start: 2025-05-29 | End: 2025-06-12

## 2025-05-29 RX ORDER — RANOLAZINE 500 MG/1
500 TABLET, EXTENDED RELEASE ORAL 2 TIMES DAILY
Qty: 60 TABLET | Refills: 1 | Status: ON HOLD | OUTPATIENT
Start: 2025-05-29 | End: 2025-06-12

## 2025-05-29 RX ORDER — ASPIRIN 81 MG/1
81 TABLET, CHEWABLE ORAL DAILY
Qty: 30 TABLET | Refills: 1 | Status: ON HOLD | OUTPATIENT
Start: 2025-05-29 | End: 2025-06-12

## 2025-05-29 RX ORDER — FLUTICASONE FUROATE AND VILANTEROL 200; 25 UG/1; UG/1
1 POWDER RESPIRATORY (INHALATION) DAILY
Qty: 60 EACH | Refills: 0 | Status: ON HOLD | OUTPATIENT
Start: 2025-05-29 | End: 2025-06-12

## 2025-05-29 NOTE — UTILIZATION REVIEW
NOTIFICATION OF ADMISSION DISCHARGE   This is a Notification of Discharge from [facility]. Please be advised that this patient has been discharge from our facility. Below you will find the admission and discharge date and time including the patient’s disposition.   UTILIZATION REVIEW CONTACT:  Utilization Review Assistants  Network Utilization Review Department  Phone: [phone] x carefully listen to the prompts. All voicemails are confidential.  Email: [email or web address]     ADMISSION INFORMATION  PRESENTATION DATE: 5/24/2025  3:44 PM  OBERVATION ADMISSION DATE: 05/24/2025 2049  INPATIENT ADMISSION DATE: 5/25/25  4:46 PM   DISCHARGE DATE: 5/28/2025  2:22 PM   DISPOSITION:Home/Self Care    Network Utilization Review Department  ATTENTION: Please call with any questions or concerns to [phone] and carefully listen to the prompts so that you are directed to the right person. All voicemails are confidential.   For Discharge needs, contact Care Management DC Support Team at [phone] opt. 2  Send all requests for admission clinical reviews, approved or denied determinations and any other requests to dedicated fax number below belonging to the campus where the patient is receiving treatment. List of dedicated fax numbers for the Facilities:  FACILITY NAME UR FAX NUMBER   ADMISSION DENIALS (Administrative/Medical Necessity) [phone]   DISCHARGE SUPPORT TEAM ([facility]) [phone]   PARENT CHILD HEALTH (Maternity/NICU/Pediatrics) [phone]   [facility] [phone]   [facility] [phone]   [facility] [phone]   [facility] [phone]   [facility] [phone]   [facility] [phone]   [facility] [phone]   [facility] [phone]    [facility] [phone]   [facility] [phone]   [facility] [phone]   [facility] [phone]

## 2025-05-29 NOTE — TELEPHONE ENCOUNTER
Jono Lozada,    Patient called stating that some of his medications were not filled, I do see that he was in the hospital and several medications were stopped.  He isn't sure which ones he needs refilled at this point.    Can one of you help with this?    Patient can be reached back at:  [phone]    Thank you!

## 2025-05-30 PROBLEM — J69.0 ASPIRATION PNEUMONIA OF BOTH LOWER LOBES (HCC): Status: RESOLVED | Noted: 2025-04-30 | Resolved: 2025-05-30

## 2025-06-02 ENCOUNTER — APPOINTMENT (EMERGENCY)
Dept: RADIOLOGY | Facility: HOSPITAL | Age: 65
DRG: 308 | End: 2025-06-02
Payer: COMMERCIAL

## 2025-06-02 ENCOUNTER — HOSPITAL ENCOUNTER (EMERGENCY)
Facility: HOSPITAL | Age: 65
Discharge: HOME | DRG: 308 | End: 2025-06-02
Attending: EMERGENCY MEDICINE
Payer: COMMERCIAL

## 2025-06-02 VITALS
TEMPERATURE: 97.6 F | DIASTOLIC BLOOD PRESSURE: 74 MMHG | HEART RATE: 106 BPM | OXYGEN SATURATION: 93 % | RESPIRATION RATE: 18 BRPM | SYSTOLIC BLOOD PRESSURE: 135 MMHG

## 2025-06-02 DIAGNOSIS — S01.01XA LACERATION OF SCALP, INITIAL ENCOUNTER: ICD-10-CM
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
## 2025-06-02 PROCEDURE — 72125 CT NECK SPINE W/O DYE: CPT

## 2025-06-02 PROCEDURE — 70450 CT HEAD/BRAIN W/O DYE: CPT

## 2025-06-02 PROCEDURE — 12002 RPR S/N/AX/GEN/TRNK2.6-7.5CM: CPT | Performed by: EMERGENCY MEDICINE

## 2025-06-02 PROCEDURE — 99284 EMERGENCY DEPT VISIT MOD MDM: CPT | Performed by: EMERGENCY MEDICINE

## 2025-06-02 PROCEDURE — 0HQ0XZZ REPAIR SCALP SKIN, EXTERNAL APPROACH: ICD-10-PCS | Performed by: EMERGENCY MEDICINE

## 2025-06-02 PROCEDURE — 99284 EMERGENCY DEPT VISIT MOD MDM: CPT

## 2025-06-02 RX ORDER — ACETAMINOPHEN 325 MG/1
975 TABLET ORAL ONCE
Status: COMPLETED | OUTPATIENT
Start: 2025-06-02 | End: 2025-06-02

## 2025-06-02 RX ORDER — GINSENG 100 MG
1 CAPSULE ORAL ONCE
Status: COMPLETED | OUTPATIENT
Start: 2025-06-02 | End: 2025-06-02

## 2025-06-02 RX ORDER — LIDOCAINE HYDROCHLORIDE AND EPINEPHRINE 10; 10 MG/ML; UG/ML
5 INJECTION, SOLUTION INFILTRATION; PERINEURAL ONCE
Status: COMPLETED | OUTPATIENT
Start: 2025-06-02 | End: 2025-06-02

## 2025-06-02 RX ADMIN — BACITRACIN ZINC 1 SMALL APPLICATION: 500 OINTMENT TOPICAL at 03:55

## 2025-06-02 RX ADMIN — LIDOCAINE HYDROCHLORIDE,EPINEPHRINE BITARTRATE 5 ML: 10; .01 INJECTION, SOLUTION INFILTRATION; PERINEURAL at 01:44

## 2025-06-02 RX ADMIN — ACETAMINOPHEN 975 MG: 325 TABLET, FILM COATED ORAL at 03:54

## 2025-06-02 NOTE — ED PROVIDER NOTES
Time reflects when diagnosis was documented in both MDM as applicable and the Disposition within this note       Time User Action Codes Description Comment    6/2/2025  3:52 AM Seven Severino [W19.XXXA] Fall, initial encounter     6/2/2025  3:52 AM Seven Severino Add [S01.01XA] Laceration of scalp, initial encounter           ED Disposition       ED Disposition   Discharge    Condition   Stable    Date/Time   Mon Jun 2, 2025  3:52 AM    Comment   Gregg Martínezjann discharge to home/self care.                   Assessment & Plan       Medical Decision Making  65 year old male presenting to the ED today for a fall. Likely mechanical in nature and due to alcohol use. Differential diagnosis at this time is Skull fx, SDH, cervical spine fracture. Will do CT head and C spine w/o contrast. Will do laceration repair. See procedure note for details. I used dissolvable because I do not think this patient will follow up for suture removal at all.     CT imaging was negative. Counseled patient on alcohol cessation. Wound care discussed. Encouraged outpatient follow up and patient was discharged home.    Amount and/or Complexity of Data Reviewed  Radiology: ordered.    Risk  OTC drugs.  Prescription drug management.             Medications   lidocaine-epinephrine (XYLOCAINE/EPINEPHRINE) 1 %-1:100,000 injection 5 mL (5 mL Infiltration Given 6/2/25 0144)   acetaminophen (TYLENOL) tablet 975 mg (975 mg Oral Given 6/2/25 0354)   bacitracin topical ointment 1 small application (1 small application Topical Given 6/2/25 0355)       ED Risk Strat Scores                    No data recorded        SBIRT 20yo+      Flowsheet Row Most Recent Value   Initial Alcohol Screen: US AUDIT-C     1. How often do you have a drink containing alcohol? 6 Filed at: 06/02/2025 0142   Audit-C Score 6 Filed at: 06/02/2025 0142   BRIGIDA: How many times in the past year have you...    Used an illegal drug or used a prescription medication for non-medical  reasons? Never Filed at: 06/02/2025 0142                            History of Present Illness       Chief Complaint   Patient presents with    Fall     Pt brought in by EMS unwitnessed fall found in hallway by tin, pt on thinners and laceration on left side of head..       Past Medical History[1]   Past Surgical History[2]   Family History[3]   Social History[4]   E-Cigarette/Vaping    E-Cigarette Use Never User       E-Cigarette/Vaping Substances    Nicotine Yes     THC No     CBD No     Flavoring No     Other No     Unknown No       I have reviewed and agree with the history as documented.     65 year old male well known to our department coming in for a fall and scalp laceration.  He was drinking tonight. Unknown amount. Found in the hallway of his apartment building after falling.  C -collar in place by EMS. Has a scalp laceration on the left side.        Review of Systems   Constitutional:  Negative for chills and fever.   HENT:  Negative for hearing loss.    Eyes:  Negative for visual disturbance.   Respiratory:  Negative for shortness of breath.    Cardiovascular:  Negative for chest pain.   Gastrointestinal:  Negative for abdominal pain, constipation, diarrhea, nausea and vomiting.   Genitourinary:  Negative for difficulty urinating.   Musculoskeletal:  Negative for myalgias.   Skin:  Negative for rash.   Neurological:  Negative for dizziness.   Psychiatric/Behavioral:  Negative for agitation.    All other systems reviewed and are negative.          Objective       ED Triage Vitals [06/02/25 0138]   Temperature Pulse Blood Pressure Respirations SpO2 Patient Position - Orthostatic VS   97.6 °F (36.4 °C) (!) 106 135/74 18 93 % Sitting      Temp Source Heart Rate Source BP Location FiO2 (%) Pain Score    Oral Monitor Right arm -- 8      Vitals      Date and Time Temp Pulse SpO2 Resp BP Pain Score FACES Pain Rating User   06/02/25 0354 -- -- -- -- -- 7 --    06/02/25 0148 -- -- -- -- -- 8 -- SW    06/02/25 0138 97.6 °F (36.4 °C) 106 93 % 18 135/74 8 head pain --             Physical Exam  Vitals and nursing note reviewed.   Constitutional:       General: He is not in acute distress.     Appearance: Normal appearance. He is not ill-appearing.   HENT:      Head: Normocephalic and atraumatic.      Right Ear: External ear normal. There is no impacted cerumen.      Left Ear: External ear normal.      Nose: Nose normal. No congestion.      Mouth/Throat:      Mouth: Mucous membranes are moist.      Pharynx: Oropharynx is clear. No oropharyngeal exudate.     Eyes:      General:         Right eye: No discharge.         Left eye: No discharge.      Extraocular Movements: Extraocular movements intact.      Conjunctiva/sclera: Conjunctivae normal.      Pupils: Pupils are equal, round, and reactive to light.       Cardiovascular:      Rate and Rhythm: Normal rate and regular rhythm.      Pulses: Normal pulses.      Heart sounds: Normal heart sounds.   Pulmonary:      Effort: Pulmonary effort is normal. No respiratory distress.      Breath sounds: Normal breath sounds. No wheezing.   Abdominal:      General: Abdomen is flat. Bowel sounds are normal. There is no distension.      Palpations: Abdomen is soft.      Tenderness: There is no abdominal tenderness.     Musculoskeletal:         General: No swelling or deformity. Normal range of motion.      Cervical back: Normal range of motion. No rigidity.      Comments: No C, T, L spine tenderness. No pain with passive ROM of the upper or Lower extremities.     Skin:     General: Skin is warm and dry.      Capillary Refill: Capillary refill takes less than 2 seconds.      Comments: 3 cm laceration to the left scalp. Bleeding controlled.     Neurological:      General: No focal deficit present.      Mental Status: He is alert and oriented to person, place, and time. Mental status is at baseline.      Cranial Nerves: No cranial nerve deficit.      Motor: No weakness.      Gait:  Gait normal.     Psychiatric:         Mood and Affect: Mood normal.         Behavior: Behavior normal.         Results Reviewed       None            CT spine cervical without contrast   Final Interpretation by Feng Coreas MD (06/02 0338)      No cervical spine fracture or traumatic malalignment.                  Workstation performed: [id number]         CT head without contrast   Final Interpretation by Feng Coreas MD (06/02 0338)      Left frontotemporal scalp laceration. No intracranial hemorrhage or calvarial fracture                  Workstation performed: [id number]               Universal Protocol:  procedure performed by consultantConsent: Verbal consent obtained  Risks and benefits: risks, benefits and alternatives were discussed  Consent given by: patient  Patient understanding: patient states understanding of the procedure being performed  Patient identity confirmed: verbally with patient and arm band  Laceration repair    Date/Time: 6/2/2025 2:20 AM    Performed by: Seven Severino MD  Authorized by: Seven Severino MD  Body area: head/neck  Location details: scalp  Laceration length: 3 cm  Foreign bodies: no foreign bodies  Tendon involvement: none  Nerve involvement: none  Vascular damage: no  Anesthesia: local infiltration    Anesthesia:  Local Anesthetic: lidocaine 1% with epinephrine  Anesthetic total: 2 mL    Wound Dehiscence:  Superficial Wound Dehiscence: simple closure      Procedure Details:  Preparation: Patient was prepped and draped in the usual sterile fashion.  Irrigation solution: saline  Irrigation method: syringe  Amount of cleaning: standard  Debridement: none  Degree of undermining: none  Wound skin closure material used: 5-0 Chromic.  Number of sutures: 3  Technique: simple  Approximation: close  Approximation difficulty: simple  Dressing: antibiotic ointment  Patient tolerance: patient tolerated the procedure well with no immediate complications          ED Medication and Procedure  Management   Prior to Admission Medications   Prescriptions Last Dose Informant Patient Reported? Taking?   Blood Glucose Monitoring Suppl (ONE TOUCH ULTRA MINI) w/Device KIT  Self Yes No   Sig: Use as directed   Blood Pressure Monitoring (Adult Blood Pressure Cuff Lg) KIT   No No   Sig: Use in the morning   ONETOUCH DELICA LANCETS FINE MISC  Self Yes No   Sig: in the morning and in the evening and before bedtime. Test.   Thiamine Mononitrate (VITAMIN B1) 100 mg tablet   No No   Sig: Take 1 tablet (100 mg total) by mouth in the morning.   albuterol (PROVENTIL HFA,VENTOLIN HFA) 90 mcg/act inhaler   No No   Sig: Inhale 2 puffs every 6 (six) hours as needed for wheezing or shortness of breath   aluminum-magnesium hydroxide 200-200 MG/5ML suspension   No No   Sig: Take 5 mL by mouth every 6 (six) hours as needed for heartburn   apixaban (Eliquis) 5 mg   No No   Sig: Take 1 tablet (5 mg total) by mouth in the morning and 1 tablet (5 mg total) in the evening.   aspirin 81 mg chewable tablet   No No   Sig: Chew 1 tablet (81 mg total) in the morning.   atorvastatin (LIPITOR) 40 mg tablet   No No   Sig: Take 1 tablet (40 mg total) by mouth in the morning.   diltiazem (CARDIZEM SR) 120 mg 12 hr capsule   No No   Sig: Take 1 capsule (120 mg total) by mouth in the morning and 1 capsule (120 mg total) in the evening.   ferrous sulfate 325 (65 Fe) mg tablet   No No   Sig: Take 1 tablet (325 mg total) by mouth daily with breakfast   fluticasone-vilanterol 200-25 mcg/actuation inhaler   No No   Sig: Inhale 1 puff in the morning. Rinse mouth after use.   folic acid (FOLVITE) 400 mcg tablet   No No   Sig: Take 1 tablet (400 mcg total) by mouth in the morning.   gabapentin (NEURONTIN) 300 mg capsule   No No   Sig: Take 1 capsule (300 mg total) by mouth in the morning and 1 capsule (300 mg total) in the evening and 1 capsule (300 mg total) before bedtime.   lidocaine (LIDODERM) 5 %   No No   Sig: Apply 1 patch over 12 hours topically  in the morning. Remove & Discard patch within 12 hours or as directed by MD.   magnesium oxide-pyridoxine (BEELITH) 362-20 MG TABS   No No   Sig: Take 1 tablet by mouth in the morning.   metFORMIN (GLUCOPHAGE) 500 mg tablet   No No   Sig: Take 1 tablet (500 mg total) by mouth daily with breakfast   metoprolol succinate (TOPROL-XL) 100 mg 24 hr tablet   No No   Sig: Take 1 tablet (100 mg total) by mouth daily   naltrexone (REVIA) 50 mg tablet   No No   Sig: Take 1 tablet (50 mg total) by mouth in the morning.   nicotine (NICODERM CQ) 21 mg/24 hr TD 24 hr patch   Yes No   Sig: Place 1 patch on the skin every 24 hours   pantoprazole (PROTONIX) 40 mg tablet   No No   Sig: Take 1 tablet (40 mg total) by mouth in the morning.   ranolazine (RANEXA) 500 mg 12 hr tablet   No No   Sig: Take 1 tablet (500 mg total) by mouth in the morning and 1 tablet (500 mg total) in the evening.   saccharomyces boulardii (FLORASTOR) 250 mg capsule   No No   Sig: Take 1 capsule (250 mg total) by mouth in the morning and 1 capsule (250 mg total) in the evening.   senna-docusate sodium (SENOKOT-S) 8.6-50 mg per tablet   No No   Sig: Take 1 tablet by mouth in the morning.   tamsulosin (FLOMAX) 0.4 mg   No No   Sig: Take 1 capsule (0.4 mg total) by mouth daily with dinner   vancomycin (VANCOCIN) 125 MG capsule   No No   Sig: Take 1 capsule (125 mg total) by mouth every 6 (six) hours for 10 days      Facility-Administered Medications: None     Patient's Medications   Discharge Prescriptions    No medications on file     No discharge procedures on file.  ED SEPSIS DOCUMENTATION   Time reflects when diagnosis was documented in both MDM as applicable and the Disposition within this note       Time User Action Codes Description Comment    6/2/2025  3:52 AM Seven Severino [W19.XXXA] Fall, initial encounter     6/2/2025  3:52 AM Seven Severino [S01.01XA] Laceration of scalp, initial encounter                      [1]   Past Medical  "History:  Diagnosis Date    Acute on chronic kidney failure  (HCC)     Alcohol abuse     Alcohol withdrawal (HCC) 06/07/2019    Atrial fibrillation (HCC)     Cancer (HCC)     prostate ca,had radiation    Cardiac disease     stents,then triple bypass    COPD (chronic obstructive pulmonary disease) (HCC)     Coronary artery disease     ETOH abuse     Heart failure (HCC)     History of heart surgery     says triple bypass [facility]    Hx of heart artery stent     2014    Hyperlipidemia     Hypertension     Hyponatremia 10/17/2019    Hypovolemic shock (HCC) 12/22/2019    Lumbar spondylitis (HCC) 10/13/2022    Metabolic acidosis, increased anion gap 04/21/2021    Nasal bone fracture 10/10/2022    Prostate CA (HCC)     S/P CABG x 3     2004    Sleep apnea    [2]   Past Surgical History:  Procedure Laterality Date    CARDIAC CATHETERIZATION      2 stents    CORONARY ARTERY BYPASS GRAFT      CORONARY ARTERY BYPASS GRAFT  2004    [place] ARTHRD ANT INTERBODY MIN DSC CRV BELOW C2 N/A 12/16/2020    Procedure: Anterior cervical discectomy with fusion C4-C7; Posterior cervical decompression and fusion C2-T2;  Surgeon: David Rowell MD;  Location: [facility];  Service: Neurosurgery    TONSILLECTOMY     [3]   Family History  Problem Relation Name Age of Onset    Diabetes Mother      Uterine cancer Mother      COPD Father      Hypertension Father     [4]   Social History  Tobacco Use    Smoking status: Every Day     Current packs/day: 1.50     Average packs/day: 1.5 packs/day for 40.0 years (60.0 ttl pk-yrs)     Types: Cigarettes    Smokeless tobacco: Never   Vaping Use    Vaping status: Never Used   Substance Use Topics    Alcohol use: Yes     Alcohol/week: 4.0 standard drinks of alcohol     Types: 4 Standard drinks or equivalent per week     Comment: \"quart a day\"    Drug use: No        Seven Severino MD  06/02/25 0415    "

## 2025-06-02 NOTE — DISCHARGE INSTRUCTIONS
You have 3 stitches on the side of your head.  These will dissolve and fall out on their own.  Please follow-up with your primary care doctor.  Please continue Tylenol for pain.

## 2025-06-03 ENCOUNTER — APPOINTMENT (EMERGENCY)
Dept: RADIOLOGY | Facility: HOSPITAL | Age: 65
DRG: 308 | End: 2025-06-03
Payer: COMMERCIAL

## 2025-06-03 ENCOUNTER — PATIENT OUTREACH (OUTPATIENT)
Age: 65
End: 2025-06-03

## 2025-06-03 ENCOUNTER — HOSPITAL ENCOUNTER (EMERGENCY)
Facility: HOSPITAL | Age: 65
Discharge: HOME | DRG: 308 | End: 2025-06-03
Attending: STUDENT IN AN ORGANIZED HEALTH CARE EDUCATION/TRAINING PROGRAM
Payer: COMMERCIAL

## 2025-06-03 ENCOUNTER — TELEPHONE (OUTPATIENT)
Age: 65
End: 2025-06-03

## 2025-06-03 VITALS
OXYGEN SATURATION: 98 % | HEART RATE: 110 BPM | RESPIRATION RATE: 15 BRPM | DIASTOLIC BLOOD PRESSURE: 83 MMHG | SYSTOLIC BLOOD PRESSURE: 147 MMHG | TEMPERATURE: 97.3 F

## 2025-06-03 DIAGNOSIS — J44.9 COPD, SEVERITY TO BE DETERMINED (HCC): ICD-10-CM

## 2025-06-03 DIAGNOSIS — F10.929 ALCOHOL INTOXICATION (HCC): Primary | ICD-10-CM

## 2025-06-03 DIAGNOSIS — R93.7 ABNORMAL CT OF SPINE: ICD-10-CM

## 2025-06-03 DIAGNOSIS — E11.22 TYPE 2 DIABETES MELLITUS WITH CHRONIC KIDNEY DISEASE, WITHOUT LONG-TERM CURRENT USE OF INSULIN, UNSPECIFIED CKD STAGE (HCC): Primary | ICD-10-CM

## 2025-06-03 LAB
ANION GAP SERPL CALCULATED.3IONS-SCNC: 12 MMOL/L (ref 4–13)
BASOPHILS # BLD AUTO: 0.13 THOUSANDS/ÂΜL (ref 0–0.1)
BASOPHILS NFR BLD AUTO: 3 % (ref 0–1)
BUN SERPL-MCNC: 10 MG/DL (ref 5–25)
CALCIUM SERPL-MCNC: 7.8 MG/DL (ref 8.4–10.2)
CHLORIDE SERPL-SCNC: 106 MMOL/L (ref 96–108)
CO2 SERPL-SCNC: 29 MMOL/L (ref 21–32)
CREAT SERPL-MCNC: 0.84 MG/DL (ref 0.6–1.3)
EOSINOPHIL # BLD AUTO: 0.13 THOUSAND/ÂΜL (ref 0–0.61)
EOSINOPHIL NFR BLD AUTO: 3 % (ref 0–6)
ERYTHROCYTE [DISTWIDTH] IN BLOOD BY AUTOMATED COUNT: 18.3 % (ref 11.6–15.1)
GFR SERPL CREATININE-BSD FRML MDRD: 93 ML/MIN/1.73SQ M
GLUCOSE SERPL-MCNC: 76 MG/DL (ref 65–140)
HCT VFR BLD AUTO: 33.2 % (ref 36.5–49.3)
HGB BLD-MCNC: 10.3 G/DL (ref 12–17)
IMM GRANULOCYTES # BLD AUTO: 0.03 THOUSAND/UL (ref 0–0.2)
IMM GRANULOCYTES NFR BLD AUTO: 1 % (ref 0–2)
LYMPHOCYTES # BLD AUTO: 1.74 THOUSANDS/ÂΜL (ref 0.6–4.47)
LYMPHOCYTES NFR BLD AUTO: 40 % (ref 14–44)
MCH RBC QN AUTO: 31.3 PG (ref 26.8–34.3)
MCHC RBC AUTO-ENTMCNC: 31 G/DL (ref 31.4–37.4)
MCV RBC AUTO: 101 FL (ref 82–98)
MONOCYTES # BLD AUTO: 0.63 THOUSAND/ÂΜL (ref 0.17–1.22)
MONOCYTES NFR BLD AUTO: 14 % (ref 4–12)
NEUTROPHILS # BLD AUTO: 1.74 THOUSANDS/ÂΜL (ref 1.85–7.62)
NEUTS SEG NFR BLD AUTO: 39 % (ref 43–75)
NRBC BLD AUTO-RTO: 0 /100 WBCS
PLATELET # BLD AUTO: 203 THOUSANDS/UL (ref 149–390)
PMV BLD AUTO: 9.8 FL (ref 8.9–12.7)
POTASSIUM SERPL-SCNC: 3.7 MMOL/L (ref 3.5–5.3)
RBC # BLD AUTO: 3.29 MILLION/UL (ref 3.88–5.62)
SODIUM SERPL-SCNC: 147 MMOL/L (ref 135–147)
WBC # BLD AUTO: 4.4 THOUSAND/UL (ref 4.31–10.16)

## 2025-06-03 PROCEDURE — 99284 EMERGENCY DEPT VISIT MOD MDM: CPT | Performed by: STUDENT IN AN ORGANIZED HEALTH CARE EDUCATION/TRAINING PROGRAM

## 2025-06-03 PROCEDURE — 96365 THER/PROPH/DIAG IV INF INIT: CPT

## 2025-06-03 PROCEDURE — 85025 COMPLETE CBC W/AUTO DIFF WBC: CPT | Performed by: STUDENT IN AN ORGANIZED HEALTH CARE EDUCATION/TRAINING PROGRAM

## 2025-06-03 PROCEDURE — 70450 CT HEAD/BRAIN W/O DYE: CPT

## 2025-06-03 PROCEDURE — 72125 CT NECK SPINE W/O DYE: CPT

## 2025-06-03 PROCEDURE — 93005 ELECTROCARDIOGRAM TRACING: CPT

## 2025-06-03 PROCEDURE — 96375 TX/PRO/DX INJ NEW DRUG ADDON: CPT

## 2025-06-03 PROCEDURE — 36415 COLL VENOUS BLD VENIPUNCTURE: CPT | Performed by: STUDENT IN AN ORGANIZED HEALTH CARE EDUCATION/TRAINING PROGRAM

## 2025-06-03 PROCEDURE — 99284 EMERGENCY DEPT VISIT MOD MDM: CPT

## 2025-06-03 PROCEDURE — 96361 HYDRATE IV INFUSION ADD-ON: CPT

## 2025-06-03 PROCEDURE — 96376 TX/PRO/DX INJ SAME DRUG ADON: CPT

## 2025-06-03 PROCEDURE — 80048 BASIC METABOLIC PNL TOTAL CA: CPT | Performed by: STUDENT IN AN ORGANIZED HEALTH CARE EDUCATION/TRAINING PROGRAM

## 2025-06-03 RX ORDER — MAGNESIUM SULFATE HEPTAHYDRATE 40 MG/ML
2 INJECTION, SOLUTION INTRAVENOUS ONCE
Status: COMPLETED | OUTPATIENT
Start: 2025-06-03 | End: 2025-06-03

## 2025-06-03 RX ORDER — CHLORDIAZEPOXIDE HYDROCHLORIDE 25 MG/1
50 CAPSULE, GELATIN COATED ORAL ONCE
Status: DISCONTINUED | OUTPATIENT
Start: 2025-06-03 | End: 2025-06-03 | Stop reason: HOSPADM

## 2025-06-03 RX ORDER — METOPROLOL TARTRATE 1 MG/ML
5 INJECTION, SOLUTION INTRAVENOUS ONCE
Status: COMPLETED | OUTPATIENT
Start: 2025-06-03 | End: 2025-06-03

## 2025-06-03 RX ORDER — METOPROLOL SUCCINATE 50 MG/1
100 TABLET, EXTENDED RELEASE ORAL ONCE
Status: DISCONTINUED | OUTPATIENT
Start: 2025-06-03 | End: 2025-06-03 | Stop reason: HOSPADM

## 2025-06-03 RX ORDER — METOPROLOL TARTRATE 1 MG/ML
5 INJECTION, SOLUTION INTRAVENOUS ONCE
Status: DISCONTINUED | OUTPATIENT
Start: 2025-06-03 | End: 2025-06-03

## 2025-06-03 RX ADMIN — Medication 125 MG: at 12:19

## 2025-06-03 RX ADMIN — METOPROLOL TARTRATE 5 MG: 5 INJECTION INTRAVENOUS at 18:08

## 2025-06-03 RX ADMIN — SODIUM CHLORIDE 1000 ML: 0.9 INJECTION, SOLUTION INTRAVENOUS at 15:12

## 2025-06-03 RX ADMIN — SODIUM CHLORIDE 1000 ML: 0.9 INJECTION, SOLUTION INTRAVENOUS at 18:06

## 2025-06-03 RX ADMIN — METOPROLOL TARTRATE 5 MG: 5 INJECTION INTRAVENOUS at 15:12

## 2025-06-03 RX ADMIN — MAGNESIUM SULFATE HEPTAHYDRATE 2 G: 40 INJECTION, SOLUTION INTRAVENOUS at 15:31

## 2025-06-03 RX ADMIN — METOPROLOL TARTRATE 5 MG: 5 INJECTION INTRAVENOUS at 15:31

## 2025-06-03 NOTE — ED PROVIDER NOTES
Time reflects when diagnosis was documented in both MDM as applicable and the Disposition within this note       Time User Action Codes Description Comment    6/3/2025 11:38 AM Gilberto North Add [F10.929] Alcohol intoxication (HCC)     6/3/2025  4:30 PM Gilberto North Add [R93.7] Abnormal CT of spine     6/3/2025  4:31 PM Gilberto North Modify [R93.7] Abnormal CT of spine (loosening of transpedicular screws)           ED Disposition       ED Disposition   Discharge    Condition   Stable    Date/Time   Tue  3, 2025 11:38 AM    Comment   Gregg Partida discharge to home/self care.                   Assessment & Plan       Medical Decision Making  Patient is a 65 y.o. male who presents to the ED for alcohol intoxication, status post fall.  Patient is nontoxic, well-appearing.     Differential includes but is not limited to: Alcohol intoxication, intracranial bleeding, skull fracture.  Presentation not consistent with injury.    Plan: CT imaging, reassess.                   Amount and/or Complexity of Data Reviewed  Labs: ordered.  Radiology: ordered. Decision-making details documented in ED Course.    Risk  Prescription drug management.        ED Course as of 06/03/25 1632   Tue Jun 03, 2025   1137 CT head without contrast  No acute intracranial abnormality. Mild chronic white matter microangiopathic changes.     New right parietal occipital scalp hematoma. Left frontal scalp laceration again noted.     1537 Patient noted to be in A-fib with RVR.  Will treat.   1630 Pt signed out to Dr Masterson. Rate controlled. Pending re-eval once more awake       Medications   sodium chloride 0.9 % bolus 1,000 mL (1,000 mL Intravenous New Bag 6/3/25 1512)   sodium chloride 0.9 % bolus 1,000 mL (has no administration in time range)   vancomycin (VANCOCIN) oral solution 125 mg (125 mg Oral Given 6/3/25 1219)   metoprolol (LOPRESSOR) injection 5 mg (5 mg Intravenous Given 6/3/25 1512)   metoprolol (LOPRESSOR) injection 5 mg  (5 mg Intravenous Given 6/3/25 1531)   magnesium sulfate 2 g/50 mL IVPB (premix) 2 g (2 g Intravenous New Bag 6/3/25 1531)       ED Risk Strat Scores                    No data recorded                            History of Present Illness       Chief Complaint   Patient presents with    Alcohol Intoxication     Called 911 due to not feeling well. Asking for food during triage        Past Medical History[1]   Past Surgical History[2]   Family History[3]   Social History[4]   E-Cigarette/Vaping    E-Cigarette Use Never User       E-Cigarette/Vaping Substances    Nicotine Yes     THC No     CBD No     Flavoring No     Other No     Unknown No       I have reviewed and agree with the history as documented.     65-year-old male, past medical history including alcohol abuse, A-fib, who presents to the emergency department for alcohol tox occasion.  Patient called 911 due to not feeling well.  States he again fell this morning with a head strike.  Denies any chest, abdomen, back, pelvic, or neck pain.  Appears intoxicated.  No concerns.      Alcohol Intoxication      Review of Systems   All other systems reviewed and are negative.          Objective       ED Triage Vitals [06/03/25 1056]   Temperature Pulse Blood Pressure Respirations SpO2 Patient Position - Orthostatic VS   97.8 °F (36.6 °C) 90 150/93 20 95 % Lying      Temp Source Heart Rate Source BP Location FiO2 (%) Pain Score    Oral Monitor Right arm -- --      Vitals      Date and Time Temp Pulse SpO2 Resp BP Pain Score FACES Pain Rating User   06/03/25 1516 -- 120 98 % 18 127/66 -- -- DQ   06/03/25 1407 97.3 °F (36.3 °C) 112 97 % 19 119/68 -- -- JK   06/03/25 1056 97.8 °F (36.6 °C) 90 95 % 20 150/93 -- -- MK            Physical Exam  Vitals and nursing note reviewed.   Constitutional:       General: He is not in acute distress.     Appearance: He is well-developed. He is not diaphoretic.   HENT:      Head: Normocephalic and atraumatic.      Right Ear: External ear  normal.      Left Ear: External ear normal.      Nose: Nose normal.     Eyes:      General: Lids are normal. No scleral icterus.      Cardiovascular:      Rate and Rhythm: Normal rate and regular rhythm.      Heart sounds: Normal heart sounds. No murmur heard.     No friction rub. No gallop.   Pulmonary:      Effort: Pulmonary effort is normal. No respiratory distress.      Breath sounds: Normal breath sounds. No wheezing or rales.   Abdominal:      Palpations: Abdomen is soft.      Tenderness: There is no abdominal tenderness. There is no guarding or rebound.     Musculoskeletal:         General: No deformity. Normal range of motion.      Cervical back: Normal range of motion and neck supple.     Skin:     General: Skin is warm and dry.     Neurological:      General: No focal deficit present.      Mental Status: He is alert.     Psychiatric:         Mood and Affect: Mood normal.         Behavior: Behavior normal.         Results Reviewed       Procedure Component Value Units Date/Time    Basic metabolic panel [[id number]]  (Abnormal) Collected: 06/03/25 1502    Lab Status: Final result Specimen: Blood from Line, Venous Updated: 06/03/25 1531     Sodium 147 mmol/L      Potassium 3.7 mmol/L      Chloride 106 mmol/L      CO2 29 mmol/L      ANION GAP 12 mmol/L      BUN 10 mg/dL      Creatinine 0.84 mg/dL      Glucose 76 mg/dL      Calcium 7.8 mg/dL      eGFR 93 ml/min/1.73sq m     Narrative:      National Kid[employer] Foundation guidelines for Chronic Kidney Disease (CKD):     Stage 1 with normal or high GFR (GFR > 90 mL/min/1.73 square meters)    Stage 2 Mild CKD (GFR = 60-89 mL/min/1.73 square meters)    Stage 3A Moderate CKD (GFR = 45-59 mL/min/1.73 square meters)    Stage 3B Moderate CKD (GFR = 30-44 mL/min/1.73 square meters)    Stage 4 Severe CKD (GFR = 15-29 mL/min/1.73 square meters)    Stage 5 End Stage CKD (GFR <15 mL/min/1.73 square meters)  Note: GFR calculation is accurate only with a steady state  creatinine    CBC and differential [[id number]]  (Abnormal) Collected: 06/03/25 1502    Lab Status: Final result Specimen: Blood from Line, Venous Updated: 06/03/25 1515     WBC 4.40 Thousand/uL      RBC 3.29 Million/uL      Hemoglobin 10.3 g/dL      Hematocrit 33.2 %       fL      MCH 31.3 pg      MCHC 31.0 g/dL      RDW 18.3 %      MPV 9.8 fL      Platelets 203 Thousands/uL      nRBC 0 /100 WBCs      Segmented % 39 %      Immature Grans % 1 %      Lymphocytes % 40 %      Monocytes % 14 %      Eosinophils Relative 3 %      Basophils Relative 3 %      Absolute Neutrophils 1.74 Thousands/µL      Absolute Immature Grans 0.03 Thousand/uL      Absolute Lymphocytes 1.74 Thousands/µL      Absolute Monocytes 0.63 Thousand/µL      Eosinophils Absolute 0.13 Thousand/µL      Basophils Absolute 0.13 Thousands/µL             CT head without contrast   Final Interpretation by Clay Claros MD (06/03 1131)      No acute intracranial abnormality. Mild chronic white matter microangiopathic changes.      New right parietal occipital scalp hematoma. Left frontal scalp laceration again noted.                  Workstation performed: [id number]         CT spine cervical without contrast   Final Interpretation by Clay Claros MD (06/03 1137)      No cervical spine fracture identified.      Prior anterior and posterior cervical spinal fusion procedures as described above, with decompressive laminectomies from C3-C7. Lucency surrounding the transpedicular screws at T1-T2, indicative of loosening.      Stable multilevel cervical spondylosis.                  Workstation performed: [id number]             ECG 12 Lead Documentation Only    Date/Time: 6/3/2025 3:36 PM    Performed by: Gilberto North DO  Authorized by: Gilberto North DO    ECG reviewed by me, the ED Provider: yes    Patient location:  ED  Interpretation:     Interpretation: abnormal    Rate:     ECG rate:  119    ECG rate assessment: tachycardic    Rhythm:     Rhythm:  atrial fibrillation    Ectopy:     Ectopy: none    QRS:     QRS axis:  Normal  Conduction:     Conduction: normal    ST segments:     ST segments:  Non-specific  T waves:     T waves: non-specific        ED Medication and Procedure Management   Prior to Admission Medications   Prescriptions Last Dose Informant Patient Reported? Taking?   Blood Glucose Monitoring Suppl (ONE TOUCH ULTRA MINI) w/Device KIT  Self Yes No   Sig: Use as directed   Blood Pressure Monitoring (Adult Blood Pressure Cuff Lg) KIT   No No   Sig: Use in the morning   ONETOUCH DELICA LANCETS FINE MISC  Self Yes No   Sig: in the morning and in the evening and before bedtime. Test.   Thiamine Mononitrate (VITAMIN B1) 100 mg tablet   No No   Sig: Take 1 tablet (100 mg total) by mouth in the morning.   albuterol (PROVENTIL HFA,VENTOLIN HFA) 90 mcg/act inhaler   No No   Sig: Inhale 2 puffs every 6 (six) hours as needed for wheezing or shortness of breath   aluminum-magnesium hydroxide 200-200 MG/5ML suspension   No No   Sig: Take 5 mL by mouth every 6 (six) hours as needed for heartburn   apixaban (Eliquis) 5 mg   No No   Sig: Take 1 tablet (5 mg total) by mouth in the morning and 1 tablet (5 mg total) in the evening.   aspirin 81 mg chewable tablet   No No   Sig: Chew 1 tablet (81 mg total) in the morning.   atorvastatin (LIPITOR) 40 mg tablet   No No   Sig: Take 1 tablet (40 mg total) by mouth in the morning.   diltiazem (CARDIZEM SR) 120 mg 12 hr capsule   No No   Sig: Take 1 capsule (120 mg total) by mouth in the morning and 1 capsule (120 mg total) in the evening.   ferrous sulfate 325 (65 Fe) mg tablet   No No   Sig: Take 1 tablet (325 mg total) by mouth daily with breakfast   fluticasone-vilanterol 200-25 mcg/actuation inhaler   No No   Sig: Inhale 1 puff in the morning. Rinse mouth after use.   folic acid (FOLVITE) 400 mcg tablet   No No   Sig: Take 1 tablet (400 mcg total) by mouth in the morning.   gabapentin (NEURONTIN) 300 mg capsule   No No    Sig: Take 1 capsule (300 mg total) by mouth in the morning and 1 capsule (300 mg total) in the evening and 1 capsule (300 mg total) before bedtime.   lidocaine (LIDODERM) 5 %   No No   Sig: Apply 1 patch over 12 hours topically in the morning. Remove & Discard patch within 12 hours or as directed by MD.   magnesium oxide-pyridoxine (BEELITH) 362-20 MG TABS   No No   Sig: Take 1 tablet by mouth in the morning.   metFORMIN (GLUCOPHAGE) 500 mg tablet   No No   Sig: Take 1 tablet (500 mg total) by mouth daily with breakfast   metoprolol succinate (TOPROL-XL) 100 mg 24 hr tablet   No No   Sig: Take 1 tablet (100 mg total) by mouth daily   naltrexone (REVIA) 50 mg tablet   No No   Sig: Take 1 tablet (50 mg total) by mouth in the morning.   nicotine (NICODERM CQ) 21 mg/24 hr TD 24 hr patch   Yes No   Sig: Place 1 patch on the skin every 24 hours   pantoprazole (PROTONIX) 40 mg tablet   No No   Sig: Take 1 tablet (40 mg total) by mouth in the morning.   ranolazine (RANEXA) 500 mg 12 hr tablet   No No   Sig: Take 1 tablet (500 mg total) by mouth in the morning and 1 tablet (500 mg total) in the evening.   saccharomyces boulardii (FLORASTOR) 250 mg capsule   No No   Sig: Take 1 capsule (250 mg total) by mouth in the morning and 1 capsule (250 mg total) in the evening.   senna-docusate sodium (SENOKOT-S) 8.6-50 mg per tablet   No No   Sig: Take 1 tablet by mouth in the morning.   tamsulosin (FLOMAX) 0.4 mg   No No   Sig: Take 1 capsule (0.4 mg total) by mouth daily with dinner   vancomycin (VANCOCIN) 125 MG capsule   No No   Sig: Take 1 capsule (125 mg total) by mouth every 6 (six) hours for 10 days      Facility-Administered Medications: None     Patient's Medications   Discharge Prescriptions    No medications on file     No discharge procedures on file.  ED SEPSIS DOCUMENTATION   Time reflects when diagnosis was documented in both MDM as applicable and the Disposition within this note       Time User Action Codes  Description Comment    6/3/2025 11:38 AM Gilberto North Add [F10.929] Alcohol intoxication (HCC)     6/3/2025  4:30 PM Gilberto North Add [R93.7] Abnormal CT of spine     6/3/2025  4:31 PM Gilberto North Modify [R93.7] Abnormal CT of spine (loosening of transpedicular screws)                    [1]   Past Medical History:  Diagnosis Date    Acute on chronic kidney failure  (HCC)     Alcohol abuse     Alcohol withdrawal (HCC) 06/07/2019    Atrial fibrillation (HCC)     Cancer (HCC)     prostate ca,had radiation    Cardiac disease     stents,then triple bypass    COPD (chronic obstructive pulmonary disease) ([facility])     Coronary artery disease     ETOH abuse     Heart failure ([facility])     History of heart surgery     says triple bypass [facility]    Hx of heart artery stent     2014    Hyperlipidemia     Hypertension     Hyponatremia 10/17/2019    Hypovolemic shock ([facility]) 12/22/2019    Lumbar spondylitis ([facility]) 10/13/2022    Metabolic acidosis, increased anion gap 04/21/2021    Nasal bone fracture 10/10/2022    Prostate CA ([facility])     S/P CABG x 3     2004    Sleep apnea    [2]   Past Surgical History:  Procedure Laterality Date    CARDIAC CATHETERIZATION      2 stents    CORONARY ARTERY BYPASS GRAFT      CORONARY ARTERY BYPASS GRAFT  2004    MD ARTHRD ANT INTERBODY MIN DSC CRV BELOW C2 N/A 12/16/2020    Procedure: Anterior cervical discectomy with fusion C4-C7; Posterior cervical decompression and fusion C2-T2;  Surgeon: David Rowell MD;  Location: [facility];  Service: Neurosurgery    TONSILLECTOMY     [3]   Family History  Problem Relation Name Age of Onset    Diabetes Mother      Uterine cancer Mother      COPD Father      Hypertension Father     [4]   Social History  Tobacco Use    Smoking status: Every Day     Current packs/day: 1.50     Average packs/day: 1.5 packs/day for 40.0 years (60.0 ttl pk-yrs)     Types: Cigarettes    Smokeless tobacco: Never   Vaping Use    Vaping status: Never Used   Substance Use  "Topics    Alcohol use: Yes     Alcohol/week: 4.0 standard drinks of alcohol     Types: 4 Standard drinks or equivalent per week     Comment: \"quart a day\"    Drug use: No        Gilberto North DO  06/03/25 7942    "

## 2025-06-03 NOTE — DISCHARGE INSTRUCTIONS
"Please follow-up with your family doctor as well as neurosurgery for the loosening of your screws.  Please take your medications as prescribed.  Return to the emergency department for any new or concerning symptoms.    CT: \"Prior anterior and posterior cervical spinal fusion procedures as described above, with decompressive laminectomies from C3-C7. Lucency surrounding the transpedicular screws at T1-T2, indicative of loosening.\"     "

## 2025-06-03 NOTE — ED NOTES
Pt resting comfortably at present time. Respirations even and unlabored. Will continue to monitor.        Yevgeniy Lew RN  06/03/25 4736

## 2025-06-03 NOTE — TELEPHONE ENCOUNTER
Patient was seen in ED, No out reach done upon chart review Patient is currently in the ED at this time for readmission.     ED:[facility]  CC:Fall   DX: Fall laceration of scalp  Time: 1:37am       No further action needed at this time.

## 2025-06-03 NOTE — PROGRESS NOTES
"MERT RAMÍREZ received referral from provider on 5/15/2025 to assist patient with connection to substance abuse resources.     AARTI completed chart review. Per chart, patient has extensive history of ED visits related to substance use. To date, [facility] unable to reach patient due to same. Patient is currently admitted to the ED due to alcohol intoxication and \"not feeling well.\"    MERT sent email to HURU team members Eva and Glendy to request evaluation for the High Utilizer Care Plan. AARTI will await response.  "

## 2025-06-03 NOTE — ED CARE HANDOFF
Emergency Department Sign Out Note        Sign out and transfer of care from Dr. North. See Separate Emergency Department note.     The patient, Gregg Partida, was evaluated by the previous provider for alcohol intoxication, atrial fibrillation.    Workup Completed:  CBC, BMP    ED Course / Workup Pending (followup):  Patient signed out with plan to observe until clinically sober.  Patient clinically sober on reassessment after approximately 8-hour treatment time.  Ambulatory with a walker.  Stating he overall feels better.  Discharged with return precautions        No data recorded                          ED Course as of 06/03/25 1838   Tue Jun 03, 2025   1624 Intoxicated, initially afib with RVR, now HR better, can d/c when more awake and sober     Procedures  Medical Decision Making  Amount and/or Complexity of Data Reviewed  Labs: ordered.  Radiology: ordered.    Risk  Prescription drug management.            Disposition  Final diagnoses:   Alcohol intoxication (HCC)   Abnormal CT of spine (loosening of transpedicular screws)     Time reflects when diagnosis was documented in both MDM as applicable and the Disposition within this note       Time User Action Codes Description Comment    6/3/2025 11:38 AM Gilberto North Add [F10.929] Alcohol intoxication (HCC)     6/3/2025  4:30 PM Gilberto North Add [R93.7] Abnormal CT of spine     6/3/2025  4:31 PM Gilberto North Modify [R93.7] Abnormal CT of spine (loosening of transpedicular screws)           ED Disposition       ED Disposition   Discharge    Condition   Stable    Date/Time   Tue  3, 2025 11:38 AM    Comment   Gregg Partida discharge to home/self care.                   Follow-up Information       Follow up With Specialties Details Why Contact Info Additional Information    [employer]  Call in 1 day  [phone]       [facility] Emergency Department Emergency Medicine   [address] [place] [address] [id number] [phone]  [facility] Emergency Department, [address], [place], [address]    [facility] Neurosurgical [facility] Neurosurgery   [address] [address] [phone] [facility], [address] [address], [place], [place], [address] [phone]          Patient's Medications   Discharge Prescriptions    No medications on file     No discharge procedures on file.       ED Provider  Electronically Signed by     Nathen Masterson MD  06/03/25 3831

## 2025-06-04 ENCOUNTER — PATIENT OUTREACH (OUTPATIENT)
Age: 65
End: 2025-06-04

## 2025-06-04 ENCOUNTER — PATIENT OUTREACH (OUTPATIENT)
Dept: CASE MANAGEMENT | Facility: OTHER | Age: 65
End: 2025-06-04

## 2025-06-04 LAB
ATRIAL RATE: 357 BPM
QRS AXIS: 115 DEGREES
QRSD INTERVAL: 88 MS
QT INTERVAL: 350 MS
QTC INTERVAL: 492 MS
T WAVE AXIS: 260 DEGREES
VENTRICULAR RATE: 119 BPM

## 2025-06-04 PROCEDURE — 93010 ELECTROCARDIOGRAM REPORT: CPT | Performed by: INTERNAL MEDICINE

## 2025-06-04 NOTE — PROGRESS NOTES
Referral received via email. Referred to High Utilizer Care today to be reviewed for careplan.  Email received from High Utilizer Committee Committee reviewed and determined a care plan is not appropriate at this time.  No further recommendation made.    Per chart review noted case is open to OP CM and CCM program is identified.  Both SW and RN are on this case.  Inbasket message sent to both for care coordination and case communication.    OP CM Dept Watch list updated.

## 2025-06-05 ENCOUNTER — APPOINTMENT (EMERGENCY)
Dept: RADIOLOGY | Facility: HOSPITAL | Age: 65
DRG: 308 | End: 2025-06-05
Payer: COMMERCIAL

## 2025-06-05 ENCOUNTER — PATIENT OUTREACH (OUTPATIENT)
Age: 65
End: 2025-06-05

## 2025-06-05 ENCOUNTER — HOSPITAL ENCOUNTER (INPATIENT)
Facility: HOSPITAL | Age: 65
LOS: 7 days | Discharge: HOME | DRG: 308 | End: 2025-06-12
Attending: EMERGENCY MEDICINE | Admitting: INTERNAL MEDICINE
Payer: COMMERCIAL

## 2025-06-05 DIAGNOSIS — E87.8 ELECTROLYTE ABNORMALITY: ICD-10-CM

## 2025-06-05 DIAGNOSIS — F10.90 CHRONIC ALCOHOL USE: ICD-10-CM

## 2025-06-05 DIAGNOSIS — I48.91 ATRIAL FIBRILLATION (HCC): Primary | ICD-10-CM

## 2025-06-05 DIAGNOSIS — J96.01 ACUTE RESPIRATORY FAILURE WITH HYPOXIA (HCC): ICD-10-CM

## 2025-06-05 DIAGNOSIS — E83.42 HYPOMAGNESEMIA: ICD-10-CM

## 2025-06-05 DIAGNOSIS — N40.0 BENIGN PROSTATIC HYPERPLASIA, UNSPECIFIED WHETHER LOWER URINARY TRACT SYMPTOMS PRESENT: ICD-10-CM

## 2025-06-05 DIAGNOSIS — R91.8 OPACITY OF LUNG ON IMAGING STUDY: ICD-10-CM

## 2025-06-05 DIAGNOSIS — I50.32 CHRONIC HEART FAILURE WITH PRESERVED EJECTION FRACTION (HCC): ICD-10-CM

## 2025-06-05 DIAGNOSIS — I48.91 A-FIB (HCC): ICD-10-CM

## 2025-06-05 DIAGNOSIS — R93.7 ABNORMAL CT SCAN, CERVICAL SPINE: ICD-10-CM

## 2025-06-05 DIAGNOSIS — J44.9 CHRONIC OBSTRUCTIVE PULMONARY DISEASE, UNSPECIFIED COPD TYPE (HCC): ICD-10-CM

## 2025-06-05 DIAGNOSIS — R93.89 ABNORMAL CXR: ICD-10-CM

## 2025-06-05 DIAGNOSIS — A04.71 RECURRENT CLOSTRIDIOIDES DIFFICILE DIARRHEA: ICD-10-CM

## 2025-06-05 DIAGNOSIS — F10.939 ALCOHOL WITHDRAWAL SYNDROME WITH COMPLICATION (HCC): ICD-10-CM

## 2025-06-05 DIAGNOSIS — F10.10 ALCOHOL ABUSE: ICD-10-CM

## 2025-06-05 DIAGNOSIS — E11.9 TYPE 2 DIABETES MELLITUS, WITHOUT LONG-TERM CURRENT USE OF INSULIN (HCC): ICD-10-CM

## 2025-06-05 DIAGNOSIS — K21.9 GASTROESOPHAGEAL REFLUX DISEASE, UNSPECIFIED WHETHER ESOPHAGITIS PRESENT: ICD-10-CM

## 2025-06-05 DIAGNOSIS — I25.10 CORONARY ARTERY DISEASE, UNSPECIFIED VESSEL OR LESION TYPE, UNSPECIFIED WHETHER ANGINA PRESENT, UNSPECIFIED WHETHER NATIVE OR TRANSPLANTED HEART: Chronic | ICD-10-CM

## 2025-06-05 DIAGNOSIS — F10.929 ALCOHOL INTOXICATION (HCC): ICD-10-CM

## 2025-06-05 DIAGNOSIS — D64.9 ANEMIA: ICD-10-CM

## 2025-06-05 DIAGNOSIS — E16.2 HYPOGLYCEMIA: ICD-10-CM

## 2025-06-05 PROBLEM — S31.819A WOUND OF RIGHT BUTTOCK: Status: ACTIVE | Noted: 2025-04-13
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
## 2025-06-05 PROBLEM — E44.1 MILD PROTEIN-CALORIE MALNUTRITION (HCC): Status: ACTIVE | Noted: 2021-05-13

## 2025-06-05 PROBLEM — R26.2 AMBULATORY DYSFUNCTION: Status: ACTIVE | Noted: 2019-10-17

## 2025-06-05 LAB
ALBUMIN SERPL BCG-MCNC: 3.6 G/DL (ref 3.5–5)
ALP SERPL-CCNC: 166 U/L (ref 34–104)
ALT SERPL W P-5'-P-CCNC: 65 U/L (ref 7–52)
ANION GAP SERPL CALCULATED.3IONS-SCNC: 20 MMOL/L (ref 4–13)
AST SERPL W P-5'-P-CCNC: 124 U/L (ref 13–39)
BASOPHILS # BLD AUTO: 0.15 THOUSANDS/ÂΜL (ref 0–0.1)
BASOPHILS NFR BLD AUTO: 2 % (ref 0–1)
BILIRUB SERPL-MCNC: 0.81 MG/DL (ref 0.2–1)
BNP SERPL-MCNC: 321 PG/ML (ref 0–100)
BUN SERPL-MCNC: 12 MG/DL (ref 5–25)
CALCIUM SERPL-MCNC: 7.7 MG/DL (ref 8.4–10.2)
CARDIAC TROPONIN I PNL SERPL HS: 70 NG/L (ref ?–50)
CHLORIDE SERPL-SCNC: 102 MMOL/L (ref 96–108)
CO2 SERPL-SCNC: 21 MMOL/L (ref 21–32)
CREAT SERPL-MCNC: 1.11 MG/DL (ref 0.6–1.3)
EOSINOPHIL # BLD AUTO: 0 THOUSAND/ÂΜL (ref 0–0.61)
EOSINOPHIL NFR BLD AUTO: 0 % (ref 0–6)
ERYTHROCYTE [DISTWIDTH] IN BLOOD BY AUTOMATED COUNT: 18 % (ref 11.6–15.1)
ETHANOL SERPL-MCNC: 346 MG/DL
GFR SERPL CREATININE-BSD FRML MDRD: 70 ML/MIN/1.73SQ M
GLUCOSE SERPL-MCNC: 61 MG/DL (ref 65–140)
GLUCOSE SERPL-MCNC: 64 MG/DL (ref 65–140)
HCT VFR BLD AUTO: 31.2 % (ref 36.5–49.3)
HGB BLD-MCNC: 9.7 G/DL (ref 12–17)
IMM GRANULOCYTES # BLD AUTO: 0.05 THOUSAND/UL (ref 0–0.2)
IMM GRANULOCYTES NFR BLD AUTO: 1 % (ref 0–2)
LYMPHOCYTES # BLD AUTO: 0.7 THOUSANDS/ÂΜL (ref 0.6–4.47)
LYMPHOCYTES NFR BLD AUTO: 10 % (ref 14–44)
MAGNESIUM SERPL-MCNC: 1.2 MG/DL (ref 1.9–2.7)
MCH RBC QN AUTO: 31.5 PG (ref 26.8–34.3)
MCHC RBC AUTO-ENTMCNC: 31.1 G/DL (ref 31.4–37.4)
MCV RBC AUTO: 101 FL (ref 82–98)
MONOCYTES # BLD AUTO: 0.27 THOUSAND/ÂΜL (ref 0.17–1.22)
MONOCYTES NFR BLD AUTO: 4 % (ref 4–12)
NEUTROPHILS # BLD AUTO: 5.66 THOUSANDS/ÂΜL (ref 1.85–7.62)
NEUTS SEG NFR BLD AUTO: 83 % (ref 43–75)
NRBC BLD AUTO-RTO: 0 /100 WBCS
PLATELET # BLD AUTO: 145 THOUSANDS/UL (ref 149–390)
PMV BLD AUTO: 9.7 FL (ref 8.9–12.7)
POTASSIUM SERPL-SCNC: 4.1 MMOL/L (ref 3.5–5.3)
PROT SERPL-MCNC: 7 G/DL (ref 6.4–8.4)
RBC # BLD AUTO: 3.08 MILLION/UL (ref 3.88–5.62)
SODIUM SERPL-SCNC: 143 MMOL/L (ref 135–147)
TSH SERPL DL<=0.05 MIU/L-ACNC: 0.49 UIU/ML (ref 0.45–4.5)
WBC # BLD AUTO: 6.83 THOUSAND/UL (ref 4.31–10.16)

## 2025-06-05 PROCEDURE — 72125 CT NECK SPINE W/O DYE: CPT

## 2025-06-05 PROCEDURE — 96375 TX/PRO/DX INJ NEW DRUG ADDON: CPT

## 2025-06-05 PROCEDURE — 83735 ASSAY OF MAGNESIUM: CPT | Performed by: EMERGENCY MEDICINE

## 2025-06-05 PROCEDURE — 70450 CT HEAD/BRAIN W/O DYE: CPT

## 2025-06-05 PROCEDURE — 82948 REAGENT STRIP/BLOOD GLUCOSE: CPT

## 2025-06-05 PROCEDURE — 84484 ASSAY OF TROPONIN QUANT: CPT

## 2025-06-05 PROCEDURE — 71045 X-RAY EXAM CHEST 1 VIEW: CPT

## 2025-06-05 PROCEDURE — 82077 ASSAY SPEC XCP UR&BREATH IA: CPT | Performed by: EMERGENCY MEDICINE

## 2025-06-05 PROCEDURE — 82550 ASSAY OF CK (CPK): CPT

## 2025-06-05 PROCEDURE — 99285 EMERGENCY DEPT VISIT HI MDM: CPT

## 2025-06-05 PROCEDURE — 83880 ASSAY OF NATRIURETIC PEPTIDE: CPT | Performed by: EMERGENCY MEDICINE

## 2025-06-05 PROCEDURE — 96365 THER/PROPH/DIAG IV INF INIT: CPT

## 2025-06-05 PROCEDURE — 99291 CRITICAL CARE FIRST HOUR: CPT | Performed by: EMERGENCY MEDICINE

## 2025-06-05 PROCEDURE — 84484 ASSAY OF TROPONIN QUANT: CPT | Performed by: EMERGENCY MEDICINE

## 2025-06-05 PROCEDURE — 93005 ELECTROCARDIOGRAM TRACING: CPT

## 2025-06-05 PROCEDURE — 85025 COMPLETE CBC W/AUTO DIFF WBC: CPT | Performed by: EMERGENCY MEDICINE

## 2025-06-05 PROCEDURE — 84443 ASSAY THYROID STIM HORMONE: CPT | Performed by: EMERGENCY MEDICINE

## 2025-06-05 PROCEDURE — 80053 COMPREHEN METABOLIC PANEL: CPT | Performed by: EMERGENCY MEDICINE

## 2025-06-05 PROCEDURE — 36415 COLL VENOUS BLD VENIPUNCTURE: CPT | Performed by: EMERGENCY MEDICINE

## 2025-06-05 RX ORDER — MAGNESIUM SULFATE HEPTAHYDRATE 40 MG/ML
2 INJECTION, SOLUTION INTRAVENOUS ONCE
Status: COMPLETED | OUTPATIENT
Start: 2025-06-05 | End: 2025-06-05

## 2025-06-05 RX ORDER — AMOXICILLIN 250 MG
1 CAPSULE ORAL DAILY
Status: DISCONTINUED | OUTPATIENT
Start: 2025-06-06 | End: 2025-06-05

## 2025-06-05 RX ORDER — DILTIAZEM HYDROCHLORIDE 5 MG/ML
10 INJECTION INTRAVENOUS ONCE
Status: COMPLETED | OUTPATIENT
Start: 2025-06-05 | End: 2025-06-05

## 2025-06-05 RX ORDER — RANOLAZINE 500 MG/1
500 TABLET, EXTENDED RELEASE ORAL 2 TIMES DAILY
Status: DISCONTINUED | OUTPATIENT
Start: 2025-06-06 | End: 2025-06-12 | Stop reason: HOSPADM

## 2025-06-05 RX ORDER — ASPIRIN 81 MG/1
81 TABLET, CHEWABLE ORAL DAILY
Status: DISCONTINUED | OUTPATIENT
Start: 2025-06-06 | End: 2025-06-12 | Stop reason: HOSPADM

## 2025-06-05 RX ORDER — VANCOMYCIN HYDROCHLORIDE 125 MG/1
125 CAPSULE ORAL EVERY 6 HOURS SCHEDULED
Status: DISCONTINUED | OUTPATIENT
Start: 2025-06-06 | End: 2025-06-12 | Stop reason: HOSPADM

## 2025-06-05 RX ORDER — ALBUTEROL SULFATE 90 UG/1
2 INHALANT RESPIRATORY (INHALATION) EVERY 6 HOURS PRN
Status: DISCONTINUED | OUTPATIENT
Start: 2025-06-05 | End: 2025-06-07

## 2025-06-05 RX ORDER — DILTIAZEM HYDROCHLORIDE 5 MG/ML
15 INJECTION INTRAVENOUS ONCE
Status: DISCONTINUED | OUTPATIENT
Start: 2025-06-05 | End: 2025-06-05

## 2025-06-05 RX ORDER — MAGNESIUM HYDROXIDE/ALUMINUM HYDROXICE/SIMETHICONE 120; 1200; 1200 MG/30ML; MG/30ML; MG/30ML
5 SUSPENSION ORAL EVERY 4 HOURS PRN
Status: DISCONTINUED | OUTPATIENT
Start: 2025-06-05 | End: 2025-06-05

## 2025-06-05 RX ORDER — FOLIC ACID 0.4 MG
400 TABLET ORAL DAILY
Status: DISCONTINUED | OUTPATIENT
Start: 2025-06-06 | End: 2025-06-07

## 2025-06-05 RX ORDER — METOPROLOL SUCCINATE 100 MG/1
100 TABLET, EXTENDED RELEASE ORAL DAILY
Status: DISCONTINUED | OUTPATIENT
Start: 2025-06-06 | End: 2025-06-06

## 2025-06-05 RX ORDER — FERROUS SULFATE 325(65) MG
325 TABLET ORAL
Status: DISCONTINUED | OUTPATIENT
Start: 2025-06-06 | End: 2025-06-12 | Stop reason: HOSPADM

## 2025-06-05 RX ORDER — SACCHAROMYCES BOULARDII 250 MG
250 CAPSULE ORAL 2 TIMES DAILY
Status: DISCONTINUED | OUTPATIENT
Start: 2025-06-06 | End: 2025-06-12 | Stop reason: HOSPADM

## 2025-06-05 RX ORDER — NICOTINE 21 MG/24HR
1 PATCH, TRANSDERMAL 24 HOURS TRANSDERMAL EVERY 24 HOURS
Status: DISCONTINUED | OUTPATIENT
Start: 2025-06-06 | End: 2025-06-12 | Stop reason: HOSPADM

## 2025-06-05 RX ORDER — GABAPENTIN 300 MG/1
300 CAPSULE ORAL 3 TIMES DAILY
Status: DISCONTINUED | OUTPATIENT
Start: 2025-06-05 | End: 2025-06-07

## 2025-06-05 RX ORDER — DEXTROSE MONOHYDRATE AND SODIUM CHLORIDE 5; .9 G/100ML; G/100ML
100 INJECTION, SOLUTION INTRAVENOUS CONTINUOUS
Status: DISCONTINUED | OUTPATIENT
Start: 2025-06-05 | End: 2025-06-06

## 2025-06-05 RX ORDER — TAMSULOSIN HYDROCHLORIDE 0.4 MG/1
0.4 CAPSULE ORAL
Status: DISCONTINUED | OUTPATIENT
Start: 2025-06-06 | End: 2025-06-12 | Stop reason: HOSPADM

## 2025-06-05 RX ORDER — LANOLIN ALCOHOL/MO/W.PET/CERES
100 CREAM (GRAM) TOPICAL DAILY
Status: DISCONTINUED | OUTPATIENT
Start: 2025-06-06 | End: 2025-06-07

## 2025-06-05 RX ORDER — DILTIAZEM HYDROCHLORIDE 60 MG/1
120 CAPSULE, EXTENDED RELEASE ORAL 2 TIMES DAILY
Status: DISCONTINUED | OUTPATIENT
Start: 2025-06-06 | End: 2025-06-06

## 2025-06-05 RX ORDER — FLUTICASONE FUROATE AND VILANTEROL 200; 25 UG/1; UG/1
1 POWDER RESPIRATORY (INHALATION) DAILY
Status: DISCONTINUED | OUTPATIENT
Start: 2025-06-06 | End: 2025-06-12 | Stop reason: HOSPADM

## 2025-06-05 RX ORDER — PANTOPRAZOLE SODIUM 40 MG/1
40 TABLET, DELAYED RELEASE ORAL DAILY
Status: DISCONTINUED | OUTPATIENT
Start: 2025-06-06 | End: 2025-06-12 | Stop reason: HOSPADM

## 2025-06-05 RX ORDER — ATORVASTATIN CALCIUM 40 MG/1
40 TABLET, FILM COATED ORAL DAILY
Status: DISCONTINUED | OUTPATIENT
Start: 2025-06-06 | End: 2025-06-12 | Stop reason: HOSPADM

## 2025-06-05 RX ADMIN — DEXTROSE AND SODIUM CHLORIDE 100 ML/HR: 5; .9 INJECTION, SOLUTION INTRAVENOUS at 22:02

## 2025-06-05 RX ADMIN — DILTIAZEM HYDROCHLORIDE 5 MG/HR: 5 INJECTION, SOLUTION INTRAVENOUS at 22:47

## 2025-06-05 RX ADMIN — DILTIAZEM HYDROCHLORIDE 10 MG: 5 INJECTION, SOLUTION INTRAVENOUS at 21:46

## 2025-06-05 RX ADMIN — MAGNESIUM SULFATE HEPTAHYDRATE 2 G: 40 INJECTION, SOLUTION INTRAVENOUS at 22:46

## 2025-06-05 NOTE — Clinical Note
Case was discussed with [facility] medicine Resident Dr. Smith and the patient's admission status was agreed to be Admission Status: inpatient status to the service of Family practice .

## 2025-06-05 NOTE — PROGRESS NOTES
SWCM received in-basket message and email from High Utilizer Care Committee indicating patient is not appropriate for a High Utilizer Care Plan.    SWCM called patient to introduce self and offer support. SWCM unable to reach patient. Left voice message requesting return call. Contact information provided.     SWCM will continue to follow up and remain available to assist as needed.

## 2025-06-06 PROBLEM — F10.929 ALCOHOL INTOXICATION (HCC): Status: ACTIVE | Noted: 2018-10-09

## 2025-06-06 PROBLEM — E87.29 HIGH ANION GAP METABOLIC ACIDOSIS: Status: RESOLVED | Noted: 2021-04-21 | Resolved: 2025-06-06

## 2025-06-06 PROBLEM — E16.2 HYPOGLYCEMIA: Status: RESOLVED | Noted: 2025-06-05 | Resolved: 2025-06-06

## 2025-06-06 LAB
2HR DELTA HS TROPONIN: 103 NG/L
4HR DELTA HS TROPONIN: 122 NG/L
ALBUMIN SERPL BCG-MCNC: 3.3 G/DL (ref 3.5–5)
ALP SERPL-CCNC: 152 U/L (ref 34–104)
ALT SERPL W P-5'-P-CCNC: 55 U/L (ref 7–52)
ANION GAP SERPL CALCULATED.3IONS-SCNC: 12 MMOL/L (ref 4–13)
AST SERPL W P-5'-P-CCNC: 96 U/L (ref 13–39)
ATRIAL RATE: 117 BPM
ATRIAL RATE: 174 BPM
BASOPHILS # BLD AUTO: 0.15 THOUSANDS/ÂΜL (ref 0–0.1)
BASOPHILS NFR BLD AUTO: 3 % (ref 0–1)
BILIRUB SERPL-MCNC: 0.77 MG/DL (ref 0.2–1)
BUN SERPL-MCNC: 11 MG/DL (ref 5–25)
CALCIUM ALBUM COR SERPL-MCNC: 8.1 MG/DL (ref 8.3–10.1)
CALCIUM SERPL-MCNC: 7.5 MG/DL (ref 8.4–10.2)
CARDIAC TROPONIN I PNL SERPL HS: 173 NG/L (ref ?–50)
CARDIAC TROPONIN I PNL SERPL HS: 192 NG/L (ref ?–50)
CHLORIDE SERPL-SCNC: 104 MMOL/L (ref 96–108)
CK SERPL-CCNC: 183 U/L (ref 39–308)
CO2 SERPL-SCNC: 27 MMOL/L (ref 21–32)
CREAT SERPL-MCNC: 0.93 MG/DL (ref 0.6–1.3)
EOSINOPHIL # BLD AUTO: 0.02 THOUSAND/ÂΜL (ref 0–0.61)
EOSINOPHIL NFR BLD AUTO: 0 % (ref 0–6)
ERYTHROCYTE [DISTWIDTH] IN BLOOD BY AUTOMATED COUNT: 17.8 % (ref 11.6–15.1)
GFR SERPL CREATININE-BSD FRML MDRD: 87 ML/MIN/1.73SQ M
GLUCOSE SERPL-MCNC: 164 MG/DL (ref 65–140)
GLUCOSE SERPL-MCNC: 70 MG/DL (ref 65–140)
GLUCOSE SERPL-MCNC: 97 MG/DL (ref 65–140)
HCT VFR BLD AUTO: 27.7 % (ref 36.5–49.3)
HGB BLD-MCNC: 8.8 G/DL (ref 12–17)
IMM GRANULOCYTES # BLD AUTO: 0.03 THOUSAND/UL (ref 0–0.2)
IMM GRANULOCYTES NFR BLD AUTO: 1 % (ref 0–2)
LYMPHOCYTES # BLD AUTO: 1.31 THOUSANDS/ÂΜL (ref 0.6–4.47)
LYMPHOCYTES NFR BLD AUTO: 22 % (ref 14–44)
MAGNESIUM SERPL-MCNC: 2 MG/DL (ref 1.9–2.7)
MCH RBC QN AUTO: 31.7 PG (ref 26.8–34.3)
MCHC RBC AUTO-ENTMCNC: 31.8 G/DL (ref 31.4–37.4)
MCV RBC AUTO: 100 FL (ref 82–98)
MONOCYTES # BLD AUTO: 0.51 THOUSAND/ÂΜL (ref 0.17–1.22)
MONOCYTES NFR BLD AUTO: 8 % (ref 4–12)
NEUTROPHILS # BLD AUTO: 4.02 THOUSANDS/ÂΜL (ref 1.85–7.62)
NEUTS SEG NFR BLD AUTO: 66 % (ref 43–75)
NRBC BLD AUTO-RTO: 0 /100 WBCS
PLATELET # BLD AUTO: 120 THOUSANDS/UL (ref 149–390)
PMV BLD AUTO: 9.7 FL (ref 8.9–12.7)
POTASSIUM SERPL-SCNC: 3.8 MMOL/L (ref 3.5–5.3)
PROT SERPL-MCNC: 6.5 G/DL (ref 6.4–8.4)
QRS AXIS: 113 DEGREES
QRS AXIS: 114 DEGREES
QRSD INTERVAL: 90 MS
QRSD INTERVAL: 92 MS
QT INTERVAL: 316 MS
QT INTERVAL: 338 MS
QTC INTERVAL: 467 MS
QTC INTERVAL: 497 MS
RBC # BLD AUTO: 2.78 MILLION/UL (ref 3.88–5.62)
SODIUM SERPL-SCNC: 143 MMOL/L (ref 135–147)
T WAVE AXIS: -62 DEGREES
T WAVE AXIS: -89 DEGREES
VENTRICULAR RATE: 115 BPM
VENTRICULAR RATE: 149 BPM
WBC # BLD AUTO: 6.04 THOUSAND/UL (ref 4.31–10.16)

## 2025-06-06 PROCEDURE — 82948 REAGENT STRIP/BLOOD GLUCOSE: CPT

## 2025-06-06 PROCEDURE — 93010 ELECTROCARDIOGRAM REPORT: CPT | Performed by: INTERNAL MEDICINE

## 2025-06-06 PROCEDURE — 87081 CULTURE SCREEN ONLY: CPT | Performed by: INTERNAL MEDICINE

## 2025-06-06 PROCEDURE — 99223 1ST HOSP IP/OBS HIGH 75: CPT | Performed by: INTERNAL MEDICINE

## 2025-06-06 PROCEDURE — 94664 DEMO&/EVAL PT USE INHALER: CPT

## 2025-06-06 PROCEDURE — 94669 MECHANICAL CHEST WALL OSCILL: CPT

## 2025-06-06 PROCEDURE — 94640 AIRWAY INHALATION TREATMENT: CPT

## 2025-06-06 PROCEDURE — 93005 ELECTROCARDIOGRAM TRACING: CPT

## 2025-06-06 PROCEDURE — 94760 N-INVAS EAR/PLS OXIMETRY 1: CPT

## 2025-06-06 PROCEDURE — 85025 COMPLETE CBC W/AUTO DIFF WBC: CPT

## 2025-06-06 PROCEDURE — 84484 ASSAY OF TROPONIN QUANT: CPT

## 2025-06-06 PROCEDURE — 83735 ASSAY OF MAGNESIUM: CPT

## 2025-06-06 PROCEDURE — 80053 COMPREHEN METABOLIC PANEL: CPT

## 2025-06-06 PROCEDURE — 94668 MNPJ CHEST WALL SBSQ: CPT

## 2025-06-06 RX ORDER — LEVALBUTEROL INHALATION SOLUTION 0.63 MG/3ML
0.63 SOLUTION RESPIRATORY (INHALATION) EVERY 6 HOURS PRN
Status: DISCONTINUED | OUTPATIENT
Start: 2025-06-06 | End: 2025-06-07

## 2025-06-06 RX ORDER — INSULIN LISPRO 100 [IU]/ML
1-5 INJECTION, SOLUTION INTRAVENOUS; SUBCUTANEOUS
Status: DISCONTINUED | OUTPATIENT
Start: 2025-06-06 | End: 2025-06-07

## 2025-06-06 RX ORDER — ACETYLCYSTEINE 200 MG/ML
3 SOLUTION ORAL; RESPIRATORY (INHALATION)
Status: DISCONTINUED | OUTPATIENT
Start: 2025-06-06 | End: 2025-06-07

## 2025-06-06 RX ORDER — MAGNESIUM SULFATE HEPTAHYDRATE 40 MG/ML
2 INJECTION, SOLUTION INTRAVENOUS ONCE
Status: COMPLETED | OUTPATIENT
Start: 2025-06-06 | End: 2025-06-06

## 2025-06-06 RX ORDER — LORAZEPAM 1 MG/1
2 TABLET ORAL ONCE
Status: COMPLETED | OUTPATIENT
Start: 2025-06-06 | End: 2025-06-06

## 2025-06-06 RX ORDER — SODIUM CHLORIDE FOR INHALATION 3 %
4 VIAL, NEBULIZER (ML) INHALATION EVERY 8 HOURS
Status: DISCONTINUED | OUTPATIENT
Start: 2025-06-06 | End: 2025-06-06

## 2025-06-06 RX ORDER — TRAMADOL HYDROCHLORIDE 50 MG/1
50 TABLET ORAL ONCE
Status: COMPLETED | OUTPATIENT
Start: 2025-06-06 | End: 2025-06-06

## 2025-06-06 RX ORDER — KETOROLAC TROMETHAMINE 30 MG/ML
15 INJECTION, SOLUTION INTRAMUSCULAR; INTRAVENOUS ONCE
Status: COMPLETED | OUTPATIENT
Start: 2025-06-06 | End: 2025-06-06

## 2025-06-06 RX ORDER — LEVALBUTEROL INHALATION SOLUTION 0.63 MG/3ML
0.63 SOLUTION RESPIRATORY (INHALATION) EVERY 8 HOURS
Status: DISCONTINUED | OUTPATIENT
Start: 2025-06-06 | End: 2025-06-07

## 2025-06-06 RX ORDER — INSULIN LISPRO 100 [IU]/ML
1-5 INJECTION, SOLUTION INTRAVENOUS; SUBCUTANEOUS
Status: DISCONTINUED | OUTPATIENT
Start: 2025-06-07 | End: 2025-06-07

## 2025-06-06 RX ORDER — METOPROLOL SUCCINATE 100 MG/1
100 TABLET, EXTENDED RELEASE ORAL 2 TIMES DAILY
Status: DISCONTINUED | OUTPATIENT
Start: 2025-06-06 | End: 2025-06-12 | Stop reason: HOSPADM

## 2025-06-06 RX ORDER — DILTIAZEM HYDROCHLORIDE 60 MG/1
120 CAPSULE, EXTENDED RELEASE ORAL 2 TIMES DAILY
Status: DISCONTINUED | OUTPATIENT
Start: 2025-06-06 | End: 2025-06-12 | Stop reason: HOSPADM

## 2025-06-06 RX ADMIN — FERROUS SULFATE TAB 325 MG (65 MG ELEMENTAL FE) 325 MG: 325 (65 FE) TAB at 09:27

## 2025-06-06 RX ADMIN — GABAPENTIN 300 MG: 300 CAPSULE ORAL at 20:48

## 2025-06-06 RX ADMIN — LEVALBUTEROL HYDROCHLORIDE 0.63 MG: 0.63 SOLUTION RESPIRATORY (INHALATION) at 21:09

## 2025-06-06 RX ADMIN — RANOLAZINE 500 MG: 500 TABLET, FILM COATED, EXTENDED RELEASE ORAL at 17:03

## 2025-06-06 RX ADMIN — VANCOMYCIN HYDROCHLORIDE 125 MG: 125 CAPSULE ORAL at 23:00

## 2025-06-06 RX ADMIN — LORAZEPAM 2 MG: 1 TABLET ORAL at 22:57

## 2025-06-06 RX ADMIN — Medication 250 MG: at 09:28

## 2025-06-06 RX ADMIN — TRAMADOL HYDROCHLORIDE 50 MG: 50 TABLET, COATED ORAL at 10:37

## 2025-06-06 RX ADMIN — DEXTROSE AND SODIUM CHLORIDE 100 ML/HR: 5; .9 INJECTION, SOLUTION INTRAVENOUS at 09:05

## 2025-06-06 RX ADMIN — GABAPENTIN 300 MG: 300 CAPSULE ORAL at 16:56

## 2025-06-06 RX ADMIN — GABAPENTIN 300 MG: 300 CAPSULE ORAL at 01:59

## 2025-06-06 RX ADMIN — VANCOMYCIN HYDROCHLORIDE 125 MG: 125 CAPSULE ORAL at 06:06

## 2025-06-06 RX ADMIN — VANCOMYCIN HYDROCHLORIDE 125 MG: 125 CAPSULE ORAL at 01:59

## 2025-06-06 RX ADMIN — VANCOMYCIN HYDROCHLORIDE 125 MG: 125 CAPSULE ORAL at 12:18

## 2025-06-06 RX ADMIN — METOPROLOL SUCCINATE 100 MG: 100 TABLET, EXTENDED RELEASE ORAL at 17:04

## 2025-06-06 RX ADMIN — NICOTINE 1 PATCH: 21 PATCH, EXTENDED RELEASE TRANSDERMAL at 01:59

## 2025-06-06 RX ADMIN — RANOLAZINE 500 MG: 500 TABLET, FILM COATED, EXTENDED RELEASE ORAL at 09:28

## 2025-06-06 RX ADMIN — KETOROLAC TROMETHAMINE 15 MG: 30 INJECTION, SOLUTION INTRAMUSCULAR; INTRAVENOUS at 17:30

## 2025-06-06 RX ADMIN — PANTOPRAZOLE SODIUM 40 MG: 40 TABLET, DELAYED RELEASE ORAL at 09:27

## 2025-06-06 RX ADMIN — THIAMINE HCL TAB 100 MG 100 MG: 100 TAB at 09:28

## 2025-06-06 RX ADMIN — APIXABAN 5 MG: 5 TABLET, FILM COATED ORAL at 09:27

## 2025-06-06 RX ADMIN — Medication 250 MG: at 17:04

## 2025-06-06 RX ADMIN — Medication 1 TABLET: at 09:27

## 2025-06-06 RX ADMIN — DILTIAZEM HYDROCHLORIDE 120 MG: 60 CAPSULE, EXTENDED RELEASE ORAL at 10:38

## 2025-06-06 RX ADMIN — GABAPENTIN 300 MG: 300 CAPSULE ORAL at 09:27

## 2025-06-06 RX ADMIN — METOPROLOL SUCCINATE 100 MG: 100 TABLET, EXTENDED RELEASE ORAL at 09:27

## 2025-06-06 RX ADMIN — DILTIAZEM HYDROCHLORIDE 120 MG: 60 CAPSULE, EXTENDED RELEASE ORAL at 17:04

## 2025-06-06 RX ADMIN — TAMSULOSIN HYDROCHLORIDE 0.4 MG: 0.4 CAPSULE ORAL at 16:56

## 2025-06-06 RX ADMIN — FOLIC ACID TAB 400 MCG 400 MCG: 400 TAB at 09:28

## 2025-06-06 RX ADMIN — APIXABAN 5 MG: 5 TABLET, FILM COATED ORAL at 17:03

## 2025-06-06 RX ADMIN — VANCOMYCIN HYDROCHLORIDE 125 MG: 125 CAPSULE ORAL at 17:04

## 2025-06-06 RX ADMIN — MAGNESIUM SULFATE HEPTAHYDRATE 2 G: 40 INJECTION, SOLUTION INTRAVENOUS at 02:13

## 2025-06-06 RX ADMIN — FLUTICASONE FUROATE AND VILANTEROL TRIFENATATE 1 PUFF: 200; 25 POWDER RESPIRATORY (INHALATION) at 10:39

## 2025-06-06 NOTE — ASSESSMENT & PLAN NOTE
Troponin 0h 70 -> 2h 173  Patient asymptomatic at time of initial assessment.   Suspect secondary to demand ischemia in setting of A-fib RVR    Monitor serial troponins

## 2025-06-06 NOTE — ED NOTES
Pt covered in feces, both old and new.  Feces appeared dark green in color.  Pt changed into clean linens and washed with soap and water.  Pt reports that he is unable to use a urinal and will need to stand.  D/t pt's significant fall risk, condom catheter placed to allow pt to void without getting out of bed.     Tanvir Espinoza RN  06/05/25 7656

## 2025-06-06 NOTE — ED PROVIDER NOTES
Time reflects when diagnosis was documented in both MDM as applicable and the Disposition within this note       Time User Action Codes Description Comment    6/5/2025 10:40 PM Anepu, Trish Add [I48.91] Atrial fibrillation (HCC)     6/5/2025 10:40 PM Anepu, Trish Add [E16.2] Hypoglycemia     6/5/2025 10:40 PM Anepu, Trish Add [E83.42] Hypomagnesemia     6/5/2025 10:41 PM Anepu, Trish Add [F10.929] Alcohol intoxication (HCC)           ED Disposition       None          Assessment & Plan       Medical Decision Making  Patient evaluated with EKG labs imaging. Given diltiazem bolus started on a diltiazem drip, started on d5 NS drip, repleted his magnesium. Ct head, ct cervical spine pending. Patient will be admitted to [facility], if ct neg. At this time care transitioned to dr mcallister.    Amount and/or Complexity of Data Reviewed  External Data Reviewed: notes.  Labs: ordered. Decision-making details documented in ED Course.  Radiology: ordered and independent interpretation performed. Decision-making details documented in ED Course.     Details: Cxr no acute disease  ECG/medicine tests: ordered and independent interpretation performed. Decision-making details documented in ED Course.    Risk  Prescription drug management.             Medications   dextrose 5 % and sodium chloride 0.9 % infusion (100 mL/hr Intravenous New Bag 6/5/25 2202)   diltiazem (CARDIZEM) 125 mg in sodium chloride 0.9 % 125 mL infusion (has no administration in time range)   magnesium sulfate 2 g/50 mL IVPB (premix) 2 g (has no administration in time range)   diltiazem (CARDIZEM) injection 10 mg (10 mg Intravenous Given 6/5/25 2146)       ED Risk Strat Scores                Critical Care Time Statement: Upon my evaluation, this patient had a high probability of imminent or life-threatening deterioration due to atrial fibrillation which required my direct attention, intervention, and personal management.  I spent a total of 45  minutes directly providing critical care services, including interpretation of complex medical databases, evaluating for the presence of life-threatening injuries or illnesses, management of organ system failure(s) , complex medical decision making (to support/prevent further life-threatening deterioration)., interpretation of hemodynamic data, and titration of continuous IV medications (drips). This time is exclusive of procedures, teaching, treating other patients, family meetings, and any prior time recorded by providers other than myself.       No data recorded                            History of Present Illness       Chief Complaint   Patient presents with    Fall     Pt reports that he fell today- reports that he was drinking and fell to his knees. Called EMS, noted to be in Afib RVR. Reports that he got some D10 en route for a BS in the 60s, 100mg thiamine, and a small NS bolus.        Past Medical History[1]   Past Surgical History[2]   Family History[3]   Social History[4]   E-Cigarette/Vaping    E-Cigarette Use Never User       E-Cigarette/Vaping Substances    Nicotine Yes     THC No     CBD No     Flavoring No     Other No     Unknown No       I have reviewed and agree with the history as documented.     66 y/o male presents with alcohol intoxication and in atrial fibrillation in RVR and low Blood sugar. He has not been compliant with his medications,. Well known to our ED and hospital. Says he fell and hit his head. Found by his neighbor. No other complaints at this time.      History provided by:  Patient and EMS personnel   used: No        Review of Systems   Constitutional: Negative.    HENT: Negative.     Eyes: Negative.    Respiratory: Negative.     Cardiovascular:  Positive for palpitations.   Gastrointestinal: Negative.    Endocrine: Negative.    Genitourinary: Negative.    Musculoskeletal: Negative.    Skin: Negative.    Allergic/Immunologic: Negative.    Neurological:   Positive for headaches.   Hematological: Negative.    Psychiatric/Behavioral: Negative.     All other systems reviewed and are negative.          Objective       ED Triage Vitals   Temperature Pulse Blood Pressure Respirations SpO2 Patient Position - Orthostatic VS   06/05/25 2141 06/05/25 2140 06/05/25 2140 06/05/25 2140 06/05/25 2140 06/05/25 2140   97.6 °F (36.4 °C) (!) 140 129/66 16 93 % Sitting      Temp Source Heart Rate Source BP Location FiO2 (%) Pain Score    06/05/25 2141 06/05/25 2140 06/05/25 2140 -- 06/05/25 2140    Oral Monitor Right arm  3      Vitals      Date and Time Temp Pulse SpO2 Resp BP Pain Score FACES Pain Rating User   06/05/25 2200 -- 125 -- 19 131/79 -- -- JG   06/05/25 2141 97.6 °F (36.4 °C) -- -- -- -- -- -- CM   06/05/25 2140 -- 140 93 % 16 129/66 3 -- CM            Physical Exam  Vitals and nursing note reviewed.   Constitutional:       Appearance: Normal appearance. He is ill-appearing.   HENT:      Head: Normocephalic and atraumatic.      Nose: Nose normal.      Mouth/Throat:      Mouth: Mucous membranes are moist.     Eyes:      Extraocular Movements: Extraocular movements intact.      Pupils: Pupils are equal, round, and reactive to light.       Cardiovascular:      Rate and Rhythm: Tachycardia present. Rhythm irregular.   Pulmonary:      Effort: Pulmonary effort is normal.      Breath sounds: Normal breath sounds.   Abdominal:      General: Abdomen is flat. Bowel sounds are normal.      Palpations: Abdomen is soft.     Musculoskeletal:         General: Normal range of motion.      Cervical back: Normal range of motion and neck supple.     Skin:     General: Skin is warm.      Capillary Refill: Capillary refill takes less than 2 seconds.     Neurological:      General: No focal deficit present.      Mental Status: He is alert and oriented to person, place, and time. Mental status is at baseline.      Cranial Nerves: No cranial nerve deficit.      Sensory: No sensory deficit.       Motor: No weakness.      Coordination: Coordination normal.      Gait: Gait normal.      Deep Tendon Reflexes: Reflexes normal.     Psychiatric:         Mood and Affect: Mood normal.         Thought Content: Thought content normal.         Results Reviewed       Procedure Component Value Units Date/Time    TSH, 3rd generation with Free T4 reflex [[id number]]  (Normal) Collected: 06/05/25 2147    Lab Status: Final result Specimen: Blood from Arm, Right Updated: 06/05/25 2234     TSH 3RD GENERATON 0.487 uIU/mL     Comprehensive metabolic panel [[id number]]  (Abnormal) Collected: 06/05/25 2147    Lab Status: Final result Specimen: Blood from Arm, Right Updated: 06/05/25 2221     Sodium 143 mmol/L      Potassium 4.1 mmol/L      Chloride 102 mmol/L      CO2 21 mmol/L      ANION GAP 20 mmol/L      BUN 12 mg/dL      Creatinine 1.11 mg/dL      Glucose 64 mg/dL      Calcium 7.7 mg/dL       U/L      ALT 65 U/L      Alkaline Phosphatase 166 U/L      Total Protein 7.0 g/dL      Albumin 3.6 g/dL      Total Bilirubin 0.81 mg/dL      eGFR 70 ml/min/1.73sq m     Narrative:      [employer] guidelines for Chronic Kidney Disease (CKD):     Stage 1 with normal or high GFR (GFR > 90 mL/min/1.73 square meters)    Stage 2 Mild CKD (GFR = 60-89 mL/min/1.73 square meters)    Stage 3A Moderate CKD (GFR = 45-59 mL/min/1.73 square meters)    Stage 3B Moderate CKD (GFR = 30-44 mL/min/1.73 square meters)    Stage 4 Severe CKD (GFR = 15-29 mL/min/1.73 square meters)    Stage 5 End Stage CKD (GFR <15 mL/min/1.73 square meters)  Note: GFR calculation is accurate only with a steady state creatinine    Magnesium [[id number]]  (Abnormal) Collected: 06/05/25 2147    Lab Status: Final result Specimen: Blood from Arm, Right Updated: 06/05/25 2221     Magnesium 1.2 mg/dL     HS Troponin I 2hr [[id number]]     Lab Status: No result Specimen: Blood     HS Troponin 0hr (reflex protocol) [[id number]]  (Abnormal) Collected: 06/05/25  2147    Lab Status: Final result Specimen: Blood from Arm, Right Updated: 06/05/25 2217     hs TnI 0hr 70 ng/L     B-Type Natriuretic Peptide(BNP) [[id number]]  (Abnormal) Collected: 06/05/25 2147    Lab Status: Final result Specimen: Blood from Arm, Right Updated: 06/05/25 2216      pg/mL     Ethanol [[id number]]  (Abnormal) Collected: 06/05/25 2147    Lab Status: Final result Specimen: Blood from Arm, Right Updated: 06/05/25 2208     Ethanol Lvl 346 mg/dL     Fingerstick Glucose (POCT) [[id number]]  (Abnormal) Collected: 06/05/25 2154    Lab Status: Final result Specimen: Blood Updated: 06/05/25 2155     POC Glucose 61 mg/dl     CBC and differential [[id number]]  (Abnormal) Collected: 06/05/25 2147    Lab Status: Final result Specimen: Blood from Arm, Right Updated: 06/05/25 2154     WBC 6.83 Thousand/uL      RBC 3.08 Million/uL      Hemoglobin 9.7 g/dL      Hematocrit 31.2 %       fL      MCH 31.5 pg      MCHC 31.1 g/dL      RDW 18.0 %      MPV 9.7 fL      Platelets 145 Thousands/uL      nRBC 0 /100 WBCs      Segmented % 83 %      Immature Grans % 1 %      Lymphocytes % 10 %      Monocytes % 4 %      Eosinophils Relative 0 %      Basophils Relative 2 %      Absolute Neutrophils 5.66 Thousands/µL      Absolute Immature Grans 0.05 Thousand/uL      Absolute Lymphocytes 0.70 Thousands/µL      Absolute Monocytes 0.27 Thousand/µL      Eosinophils Absolute 0.00 Thousand/µL      Basophils Absolute 0.15 Thousands/µL             XR chest 1 view portable    (Results Pending)   CT head without contrast    (Results Pending)   CT spine cervical without contrast    (Results Pending)       ECG 12 Lead Documentation Only    Date/Time: 6/5/2025 10:39 PM    Performed by: Trish Madden DO  Authorized by: Trish Madden DO    ECG reviewed by me, the ED Provider: yes    Patient location:  ED  Previous ECG:     Previous ECG:  Unavailable    Comparison to cardiac monitor: Yes    Interpretation:     Interpretation: abnormal     Rate:     ECG rate assessment: tachycardic    Rhythm:     Rhythm: atrial fibrillation    Ectopy:     Ectopy: none    QRS:     QRS axis:  Normal  Conduction:     Conduction: normal    ST segments:     ST segments:  Non-specific  T waves:     T waves: non-specific        ED Medication and Procedure Management   Prior to Admission Medications   Prescriptions Last Dose Informant Patient Reported? Taking?   Blood Glucose Monitoring Suppl (ONE TOUCH ULTRA MINI) w/Device KIT Not Taking Self Yes No   Sig: Use as directed   Patient not taking: Reported on 6/5/2025   Blood Pressure Monitoring (Adult Blood Pressure Cuff Lg) KIT Not Taking  No No   Sig: Use in the morning   Patient not taking: Reported on 6/5/2025   ONETOUCH DELICA LANCETS FINE MISC Not Taking Self Yes No   Sig: in the morning and in the evening and before bedtime. Test.   Patient not taking: Reported on 6/5/2025   Thiamine Mononitrate (VITAMIN B1) 100 mg tablet Not Taking  No No   Sig: Take 1 tablet (100 mg total) by mouth in the morning.   Patient not taking: Reported on 6/5/2025   albuterol (PROVENTIL HFA,VENTOLIN HFA) 90 mcg/act inhaler Not Taking  No No   Sig: Inhale 2 puffs every 6 (six) hours as needed for wheezing or shortness of breath   Patient not taking: Reported on 6/5/2025   aluminum-magnesium hydroxide 200-200 MG/5ML suspension Not Taking  No No   Sig: Take 5 mL by mouth every 6 (six) hours as needed for heartburn   Patient not taking: Reported on 6/5/2025   apixaban (Eliquis) 5 mg Not Taking  No No   Sig: Take 1 tablet (5 mg total) by mouth in the morning and 1 tablet (5 mg total) in the evening.   Patient not taking: Reported on 6/5/2025   aspirin 81 mg chewable tablet Not Taking  No No   Sig: Chew 1 tablet (81 mg total) in the morning.   Patient not taking: Reported on 6/5/2025   atorvastatin (LIPITOR) 40 mg tablet Not Taking  No No   Sig: Take 1 tablet (40 mg total) by mouth in the morning.   Patient not taking: Reported on 6/5/2025    diltiazem (CARDIZEM SR) 120 mg 12 hr capsule Not Taking  No No   Sig: Take 1 capsule (120 mg total) by mouth in the morning and 1 capsule (120 mg total) in the evening.   Patient not taking: Reported on 6/5/2025   ferrous sulfate 325 (65 Fe) mg tablet Not Taking  No No   Sig: Take 1 tablet (325 mg total) by mouth daily with breakfast   Patient not taking: Reported on 6/5/2025   fluticasone-vilanterol 200-25 mcg/actuation inhaler Not Taking  No No   Sig: Inhale 1 puff in the morning. Rinse mouth after use.   Patient not taking: Reported on 6/5/2025   folic acid (FOLVITE) 400 mcg tablet Not Taking  No No   Sig: Take 1 tablet (400 mcg total) by mouth in the morning.   Patient not taking: Reported on 6/5/2025   gabapentin (NEURONTIN) 300 mg capsule Not Taking  No No   Sig: Take 1 capsule (300 mg total) by mouth in the morning and 1 capsule (300 mg total) in the evening and 1 capsule (300 mg total) before bedtime.   Patient not taking: Reported on 6/5/2025   lidocaine (LIDODERM) 5 % Not Taking  No No   Sig: Apply 1 patch over 12 hours topically in the morning. Remove & Discard patch within 12 hours or as directed by MD.   Patient not taking: Reported on 6/5/2025   magnesium oxide-pyridoxine (BEELITH) 362-20 MG TABS Not Taking  No No   Sig: Take 1 tablet by mouth in the morning.   Patient not taking: Reported on 6/5/2025   metFORMIN (GLUCOPHAGE) 500 mg tablet Not Taking  No No   Sig: Take 1 tablet (500 mg total) by mouth daily with breakfast   Patient not taking: Reported on 6/5/2025   metoprolol succinate (TOPROL-XL) 100 mg 24 hr tablet Not Taking  No No   Sig: Take 1 tablet (100 mg total) by mouth daily   Patient not taking: Reported on 6/5/2025   naltrexone (REVIA) 50 mg tablet Not Taking  No No   Sig: Take 1 tablet (50 mg total) by mouth in the morning.   Patient not taking: Reported on 6/5/2025   nicotine (NICODERM CQ) 21 mg/24 hr TD 24 hr patch Not Taking  Yes No   Sig: Place 1 patch on the skin every 24 hours    Patient not taking: Reported on 6/5/2025   pantoprazole (PROTONIX) 40 mg tablet Not Taking  No No   Sig: Take 1 tablet (40 mg total) by mouth in the morning.   Patient not taking: Reported on 6/5/2025   ranolazine (RANEXA) 500 mg 12 hr tablet Not Taking  No No   Sig: Take 1 tablet (500 mg total) by mouth in the morning and 1 tablet (500 mg total) in the evening.   Patient not taking: Reported on 6/5/2025   saccharomyces boulardii (FLORASTOR) 250 mg capsule Not Taking  No No   Sig: Take 1 capsule (250 mg total) by mouth in the morning and 1 capsule (250 mg total) in the evening.   Patient not taking: Reported on 6/5/2025   senna-docusate sodium (SENOKOT-S) 8.6-50 mg per tablet Not Taking  No No   Sig: Take 1 tablet by mouth in the morning.   Patient not taking: Reported on 6/5/2025   tamsulosin (FLOMAX) 0.4 mg Not Taking  No No   Sig: Take 1 capsule (0.4 mg total) by mouth daily with dinner   Patient not taking: Reported on 6/5/2025   vancomycin (VANCOCIN) 125 MG capsule Not Taking  No No   Sig: Take 1 capsule (125 mg total) by mouth every 6 (six) hours for 10 days   Patient not taking: Reported on 6/5/2025      Facility-Administered Medications: None     Patient's Medications   Discharge Prescriptions    No medications on file     No discharge procedures on file.  ED SEPSIS DOCUMENTATION   Time reflects when diagnosis was documented in both MDM as applicable and the Disposition within this note       Time User Action Codes Description Comment    6/5/2025 10:40 PM Trish Madden Add [I48.91] Atrial fibrillation (HCC)     6/5/2025 10:40 PM Trish Madden Add [E16.2] Hypoglycemia     6/5/2025 10:40 PM Trish Madden Add [E83.42] Hypomagnesemia     6/5/2025 10:41 PM Trish Madden Add [F10.929] Alcohol intoxication (HCC)                      [1]   Past Medical History:  Diagnosis Date    Acute on chronic kidney failure  (HCC)     Alcohol abuse     Alcohol withdrawal (HCC) 06/07/2019    Atrial fibrillation (HCC)     Cancer  "(HCC)     prostate ca,had radiation    Cardiac disease     stents,then triple bypass    COPD (chronic obstructive pulmonary disease) (HCC)     Coronary artery disease     ETOH abuse     Heart failure ([facility])     History of heart surgery     says triple bypass [facility]    Hx of heart artery stent     2014    Hyperlipidemia     Hypertension     Hyponatremia 10/17/2019    Hypovolemic shock ([facility]) 12/22/2019    Lumbar spondylitis ([facility]) 10/13/2022    Metabolic acidosis, increased anion gap 04/21/2021    Nasal bone fracture 10/10/2022    Prostate CA ([facility])     S/P CABG x 3     2004    Sleep apnea    [2]   Past Surgical History:  Procedure Laterality Date    CARDIAC CATHETERIZATION      2 stents    CORONARY ARTERY BYPASS GRAFT      CORONARY ARTERY BYPASS GRAFT  2004    AR ARTHRD ANT INTERBODY MIN DSC CRV BELOW C2 N/A 12/16/2020    Procedure: Anterior cervical discectomy with fusion C4-C7; Posterior cervical decompression and fusion C2-T2;  Surgeon: David Rowell MD;  Location: [facility];  Service: Neurosurgery    TONSILLECTOMY     [3]   Family History  Problem Relation Name Age of Onset    Diabetes Mother      Uterine cancer Mother      COPD Father      Hypertension Father     [4]   Social History  Tobacco Use    Smoking status: Every Day     Current packs/day: 1.50     Average packs/day: 1.5 packs/day for 40.0 years (60.0 ttl pk-yrs)     Types: Cigarettes    Smokeless tobacco: Never   Vaping Use    Vaping status: Never Used   Substance Use Topics    Alcohol use: Yes     Alcohol/week: 4.0 standard drinks of alcohol     Types: 4 Standard drinks or equivalent per week     Comment: \"quart a day\"    Drug use: No        Trish Madden DO  06/05/25 0254    "

## 2025-06-06 NOTE — ASSESSMENT & PLAN NOTE
CAD s/p CABG in 2004.   Home medications: ASA 81 mg, Ranexa 500 mg BID,   No chest pain endorsed at time of initial assessment.     Holding ASA as per Thrombocytopenia  Continue Ranexa

## 2025-06-06 NOTE — ASSESSMENT & PLAN NOTE
Troponin 0h 70 -> 2h 173  Patient asymptomatic at time of initial assessment.   Suspect secondary to Type II MI in setting of A-fib w/ RVR     Monitor serial troponins

## 2025-06-06 NOTE — ASSESSMENT & PLAN NOTE
Per ED report, patient hypoglycemic on EMS assessment.  Initial ED labs notable for BG of 61.   Started D5NS while in ED.  Suspect secondary to poor PO intake     Accuchecks [facility]  Hypoglycemia protocol

## 2025-06-06 NOTE — ASSESSMENT & PLAN NOTE
"Chronic - Home medications: Gabapentin 300 mg TID, Metformin 500 mg daily  Hypoglycemic on initial presentation. Suspect secondary to poor PO intake.     Hypoglycemia per \"Hypoglycemia\"  Holding Metformin  Continue Gabapentin  Consider resuming sliding scale when sugars normalize  Hypoglycemia protocol   Status post D5 NS for admission hypoglycemia    Lab Results   Component Value Date    HGBA1C 5.2 05/05/2025       Recent Labs     06/05/25  2154 06/06/25  0220 06/06/25  1037   POCGLU 61* 70 164*       "

## 2025-06-06 NOTE — ASSESSMENT & PLAN NOTE
Chronic, stable.   Home medications: Breo-Elipta daily  No signs of acute exacerbation on initial presentation.    Continue home medications

## 2025-06-06 NOTE — UTILIZATION REVIEW
Initial Clinical Review    Admission: Date/Time/Statement:   Admission Orders (From admission, onward)       Ordered        06/05/25 2328  INPATIENT ADMISSION  Once                          Orders Placed This Encounter   Procedures    INPATIENT ADMISSION     Standing Status:   Standing     Number of Occurrences:   1     Level of Care:   Level 2 Stepdown / HOT [14]     Estimated length of stay:   More than 2 Midnights     Certification:   I certify that inpatient services are medically necessary for this patient for a duration of greater than two midnights. See H&P and MD Progress Notes for additional information about the patient's course of treatment.     ED Arrival Information       Expected   -    Arrival   6/5/2025 21:32    Acuity   Urgent              Means of arrival   Ambulance    Escorted by   [facility]    Admission type   Emergency              Arrival complaint   afib             Chief Complaint   Patient presents with    Fall     Pt reports that he fell today- reports that he was drinking and fell to his knees. Called EMS, noted to be in Afib RVR. Reports that he got some D10 en route for a BS in the 60s, 100mg thiamine, and a small NS bolus.        Initial Presentation: 65 y.o. male to ED presents for Fall at home today. Denies head strike. Reports his last drink was earlier on the day. Pt was found on the floor by his neighbor Reports he was on the floor for about 30 minutes. States he has not  eaten anything in the last 2 days. Also reports not taking medications in the last 2 days due to dropping them under a cabinet. Per EMS, he was found to be hypoglycemic, and in A-fib w/ RVR.   PMH for A-fib, alcohol use disorder, CAD s/p CABG, CHF. Recurrent c.diff infection on po Vanco, Thrombocytopenia, T2DM  Admit to Inpatient Dx; Fall, Atrial Fib with RVR, Alcohol intoxication, Increase Anion gap, Electrolyte abnormality, Hypoglycemia,  Elevated troponin (70 -> 2h 173),  Transaminitis (, ALT 65). Etoh 346. Mag 1.2, Bld glucose 61EKG consistent with A-fib w/ RVR   Plan; Cardizem drip. Hold home Cardizem and resume as indicated. IVFs. Tele monitoring.   CXR. Continue Eliquis and Metoprolol. CIWA assess. Thiamine, folic acid, MVI. Hold home Naltrexone.  Resume po Vanco. Contact precautions. Repeat C.diff panel. Mg replete with 2g IV x1. Monitor and replete as needed.  Hold home ASA and resume as indicated.   On exam; Tachycardia. Tremor    Anticipated Length of Stay/Certification Statement:  Patient will be admitted on an inpatient basis with an anticipated length of stay of greater than 2 midnights secondary to A-fib w/ RVR and alcohol withdrawal.     Date: 6/6   Day 2:     ED Treatment-Medication Administration from 06/05/2025 2132 to 06/05/2025 2357         Date/Time Order Dose Route Action     06/05/2025 2146 diltiazem (CARDIZEM) injection 10 mg 10 mg Intravenous Given     06/05/2025 2202 dextrose 5 % and sodium chloride 0.9 % infusion 100 mL/hr Intravenous New Bag     06/05/2025 2247 diltiazem (CARDIZEM) 125 mg in sodium chloride 0.9 % 125 mL infusion 5 mg/hr Intravenous New Bag     06/05/2025 2306 diltiazem (CARDIZEM) 125 mg in sodium chloride 0.9 % 125 mL infusion 7.5 mg/hr Intravenous Rate/Dose Change     06/05/2025 2319 diltiazem (CARDIZEM) 125 mg in sodium chloride 0.9 % 125 mL infusion 10 mg/hr Intravenous Rate/Dose Change     06/05/2025 2246 magnesium sulfate 2 g/50 mL IVPB (premix) 2 g 2 g Intravenous New Bag            Scheduled Medications:  apixaban, 5 mg, Oral, BID  [Held by provider] aspirin, 81 mg, Oral, Daily  [Held by provider] atorvastatin, 40 mg, Oral, Daily  diltiazem, 120 mg, Oral, BID  ferrous sulfate, 325 mg, Oral, Daily With Breakfast  fluticasone-vilanterol, 1 puff, Inhalation, Daily  folic acid, 400 mcg, Oral, Daily  gabapentin, 300 mg, Oral, TID  metoprolol succinate, 100 mg, Oral, BID  multivitamin-minerals, 1 tablet, Oral, Daily  nicotine, 1  patch, Transdermal, Q24H  pantoprazole, 40 mg, Oral, Daily  ranolazine, 500 mg, Oral, BID  saccharomyces boulardii, 250 mg, Oral, BID  tamsulosin, 0.4 mg, Oral, Daily With Dinner  Thiamine Mononitrate, 100 mg, Oral, Daily  vancomycin, 125 mg, Oral, Q6H SEDA      Continuous IV Infusions:  diltiazem, 1-15 mg/hr, Intravenous, Titrated      PRN Meds:  albuterol, 2 puff, Inhalation, Q6H PRN      ED Triage Vitals   Temperature Pulse Respirations Blood Pressure SpO2 Pain Score   06/05/25 2141 06/05/25 2140 06/05/25 2140 06/05/25 2140 06/05/25 2140 06/05/25 2140   97.6 °F (36.4 °C) (!) 140 16 129/66 93 % 3     Weight (last 2 days)       Date/Time Weight    06/06/25 0156 76.3 (168.21)            Vital Signs (last 3 days)       Date/Time Temp Pulse Resp BP MAP (mmHg) SpO2 Calculated FIO2 (%) - Nasal Cannula Nasal Cannula O2 Flow Rate (L/min) O2 Device Patient Position - Orthostatic VS [place] Coma Scale Score CIWA-Ar Total Pain    06/06/25 1131 98.3 °F (36.8 °C) -- -- -- -- -- -- -- -- -- -- -- --    06/06/25 1100 -- 103 -- 142/71 102 91 % -- -- -- -- -- -- --    06/06/25 1037 -- -- -- -- -- -- -- -- -- -- -- -- 7    06/06/25 1000 -- 104 -- 140/68 97 95 % -- -- -- -- -- -- --    06/06/25 0900 -- 107 -- 138/70 95 91 % -- -- -- -- -- -- --    06/06/25 0800 98 °F (36.7 °C) 115 20 156/75 108 92 % 28 2 L/min Nasal cannula Sitting -- 7 6    06/06/25 0700 -- 105 22 142/81 106 94 % -- -- -- -- -- -- --    06/06/25 0600 -- 98 20 131/72 96 94 % -- -- -- -- -- -- --    06/06/25 0530 -- 102 23 136/73 99 92 % -- -- -- -- -- -- --    06/06/25 0515 -- 107 20 137/71 97 93 % -- -- -- -- -- -- --    06/06/25 0500 -- 103 22 128/71 93 92 % -- -- -- -- -- -- --    06/06/25 0445 -- 102 22 122/70 91 93 % -- -- -- -- -- -- --    06/06/25 0430 -- 104 23 128/69 93 92 % -- -- -- -- -- -- --    06/06/25 0415 -- 109 21 147/78 104 93 % -- -- -- -- -- -- --    06/06/25 0400 97.5 °F (36.4 °C) 105 21 152/75 104 93 % -- -- -- -- -- 6 --    06/06/25 0345 -- 118  23 156/82 112 94 % -- -- -- -- -- -- --    06/06/25 0330 -- 134 41 134/73 91 96 % -- -- -- -- -- -- --    06/06/25 0315 -- 97 19 133/72 97 96 % -- -- -- -- -- -- --    06/06/25 0300 -- 116 22 143/85 112 97 % -- -- -- -- -- -- --    06/06/25 0245 -- 98 18 142/81 106 97 % -- -- -- -- -- -- --    06/06/25 0230 -- 102 18 138/78 102 99 % -- -- -- -- -- -- --    06/06/25 0215 -- 105 24 157/76 109 -- -- -- -- -- -- -- --    06/06/25 0200 -- 107 20 160/78 112 -- -- -- -- -- -- -- --    06/06/25 0145 -- 97 20 154/79 106 -- -- -- -- -- -- -- --    06/06/25 0130 -- 103 21 140/84 107 -- -- -- -- -- -- -- --    06/06/25 0115 -- 101 22 139/82 105 -- -- -- -- -- -- -- --    06/06/25 0100 -- 112 21 150/85 111 -- -- -- -- -- -- -- --    06/06/25 0045 -- 118 21 171/97 124 -- -- -- -- -- -- -- --    06/06/25 0030 -- 120 19 169/86 120 -- -- -- -- -- -- -- --    06/06/25 0015 97.8 °F (36.6 °C) 124 13 163/87 117 98 % 32 3 L/min Nasal cannula -- 15 6 3    06/05/25 2329 -- 130 14 134/86 103 95 % -- -- -- -- -- -- --    06/05/25 2319 -- 155 -- 169/71 -- -- -- -- -- -- -- -- --    06/05/25 2306 -- 128 -- 161/89 -- -- -- -- -- -- -- -- --    06/05/25 2247 -- 129 -- 151/82 -- -- -- -- -- -- -- -- --    06/05/25 2200 -- 125 19 131/79 99 -- -- -- -- -- -- -- --    06/05/25 2141 97.6 °F (36.4 °C) -- -- -- -- -- -- -- -- -- -- -- --    06/05/25 2140 -- 140 16 129/66 -- 93 % -- -- None (Room air) Sitting -- -- 3           CIWA-Ar Score       Row Name 06/06/25 0800 06/06/25 0400          CIWA-Ar    Nausea and Vomiting 0 0     Tactile Disturbances 0 0     Tremor 4 4     Auditory Disturbances 0 0     Paroxysmal Sweats 0 0     Visual Disturbances 0 0     Anxiety 0 0     Headache, Fullness in Head 3 1     Agitation 0 1     Orientation and Clouding of Sensorium 0 0     CIWA-Ar Total 7 6                     Pertinent Labs/Diagnostic Test Results:   Radiology:  CT head without contrast   Final Interpretation by Nereyda Sotelo MD (06/05 2845)      No  acute intracranial abnormality.                  Workstation performed: [id number]         CT spine cervical without contrast   Final Interpretation by Nereyda Sotelo MD (06/05 2320)      No cervical spine fracture or traumatic malalignment.                  Workstation performed: [id number]         XR chest 1 view portable   ED Interpretation by Seven Severino MD (06/05 2327)   No acute cardiopulm process        Cardiology:  ECG 12 lead    by Interface, Ris Results In (06/06 0716)      ECG 12 lead    by Interface, Ris Results In (06/05 2139)        GI:  No orders to display           Results from last 7 days   Lab Units 06/06/25 0605 06/05/25 2147 06/03/25  1502   WBC Thousand/uL 6.04 6.83 4.40   HEMOGLOBIN g/dL 8.8* 9.7* 10.3*   HEMATOCRIT % 27.7* 31.2* 33.2*   PLATELETS Thousands/uL 120* 145* 203   TOTAL NEUT ABS Thousands/µL 4.02 5.66 1.74*         Results from last 7 days   Lab Units 06/06/25 0605 06/05/25 2147 06/03/25  1502   SODIUM mmol/L 143 143 147   POTASSIUM mmol/L 3.8 4.1 3.7   CHLORIDE mmol/L 104 102 106   CO2 mmol/L 27 21 29   ANION GAP mmol/L 12 20* 12   BUN mg/dL 11 12 10   CREATININE mg/dL 0.93 1.11 0.84   EGFR ml/min/1.73sq m 87 70 93   CALCIUM mg/dL 7.5* 7.7* 7.8*   MAGNESIUM mg/dL 2.0 1.2*  --      Results from last 7 days   Lab Units 06/06/25 0605 06/05/25 2147   AST U/L 96* 124*   ALT U/L 55* 65*   ALK PHOS U/L 152* 166*   TOTAL PROTEIN g/dL 6.5 7.0   ALBUMIN g/dL 3.3* 3.6   TOTAL BILIRUBIN mg/dL 0.77 0.81     Results from last 7 days   Lab Units 06/06/25  1037 06/06/25  0220 06/05/25  2154   POC GLUCOSE mg/dl 164* 70 61*     Results from last 7 days   Lab Units 06/06/25 0605 06/05/25 2147 06/03/25  1502   GLUCOSE RANDOM mg/dL 97 64* 76             BETA-HYDROXYBUTYRATE   Date Value Ref Range Status   01/22/2024 3.2 (H) <0.6 mmol/L Final                  Results from last 7 days   Lab Units 06/05/25  2353   CK TOTAL U/L 183     Results from last 7 days   Lab Units 06/06/25  0143  06/05/25 2353 06/05/25 2147   HS TNI 0HR ng/L  --   --  70*   HS TNI 2HR ng/L  --  173*  --    HSTNI D2 ng/L  --  103*  --    HS TNI 4HR ng/L 192*  --   --    HSTNI D4 ng/L 122*  --   --              Results from last 7 days   Lab Units 06/05/25 2147   TSH 3RD GENERATON uIU/mL 0.487       Results from last 7 days   Lab Units 06/05/25 2147   BNP pg/mL 321*           Results from last 7 days   Lab Units 06/05/25 2147   ETHANOL LVL mg/dL 346*         Past Medical History[1]  Present on Admission:   Electrolyte abnormality   Atrial fibrillation with RVR (HCC)   Thrombocytopenia (HCC)   Transaminitis   Increased anion gap   Fall   Alcohol intoxication (HCC)   CAD (coronary artery disease)   Recurrent Clostridioides difficile diarrhea   COPD (chronic obstructive pulmonary disease) (HCC)   Type 2 diabetes mellitus, without long-term current use of insulin (HCC)   Elevated troponin   Chronic heart failure with preserved ejection fraction (HCC)      Admitting Diagnosis: Atrial fibrillation (HCC) [I48.91]  Hypomagnesemia [E83.42]  Alcohol intoxication (HCC) [F10.929]  Hypoglycemia [E16.2]  Pain [R52]  Age/Sex: 65 y.o. male    Network Utilization Review Department  ATTENTION: Please call with any questions or concerns to [phone] and carefully listen to the prompts so that you are directed to the right person. All voicemails are confidential.   For Discharge needs, contact Care Management DC Support Team at [phone] opt. [phone]  Send all requests for admission clinical reviews, approved or denied determinations and any other requests to dedicated fax number below belonging to the campus where the patient is receiving treatment. List of dedicated fax numbers for the Facilities:  FACILITY NAME UR FAX NUMBER   ADMISSION DENIALS (Administrative/Medical Necessity) [phone]   DISCHARGE SUPPORT TEAM (NETWORK) [phone]   PARENT CHILD HEALTH (Maternity/NICU/Pediatrics) [phone]   [facility]  [place] [phone]   [facility] [phone]   [facility] [phone]   [facility] [phone]   [facility] [phone]   [facility] [phone]   [facility] [phone]   [facility] [phone]   [facility] [phone]   [facility] [phone]   [facility] [phone]   [facility] [phone]              [1]   Past Medical History:  Diagnosis Date    Acute on chronic kidney failure  (HCC)     Alcohol abuse     Alcohol withdrawal (HCC) 10/9/2018    Atrial fibrillation (HCC)     Cancer (HCC)     prostate ca,had radiation    Cardiac disease     stents,then triple bypass    COPD (chronic obstructive pulmonary disease) (HCC)     Coronary artery disease     ETOH abuse     Heart failure (HCC)     History of heart surgery     [facility] bypass [facility]    Hx of heart artery stent     2014    Hyperlipidemia     Hypertension     Hyponatremia 10/17/2019    Hypovolemic shock (HCC) 12/22/2019    Lumbar spondylitis (HCC) 10/13/2022    Metabolic acidosis, increased anion gap 04/21/2021    Nasal bone fracture 10/10/2022    Prostate CA (HCC)     S/P CABG x 3     2004    Sleep apnea

## 2025-06-06 NOTE — PLAN OF CARE
Problem: PAIN - ADULT  Goal: Verbalizes/displays adequate comfort level or baseline comfort level  Description: Interventions:  - Encourage patient to monitor pain and request assistance  - Assess pain using appropriate pain scale  - Administer analgesics as ordered based on type and severity of pain and evaluate response  - Implement non-pharmacological measures as appropriate and evaluate response  - Consider cultural and social influences on pain and pain management  - Notify physician/advanced practitioner if interventions unsuccessful or patient reports new pain  - Educate patient/family on pain management process including their role and importance of  reporting pain   - Provide non-pharmacologic/complimentary pain relief interventions  Outcome: Progressing     Problem: INFECTION - ADULT  Goal: Absence or prevention of progression during hospitalization  Description: INTERVENTIONS:  - Assess and monitor for signs and symptoms of infection  - Monitor lab/diagnostic results  - Monitor all insertion sites, i.e. indwelling lines, tubes, and drains  - Monitor endotracheal if appropriate and nasal secretions for changes in amount and color  - [place] appropriate cooling/warming therapies per order  - Administer medications as ordered  - Instruct and encourage patient and family to use good hand hygiene technique  - Identify and instruct in appropriate isolation precautions for identified infection/condition  Outcome: Progressing     Problem: SAFETY ADULT  Goal: Patient will remain free of falls  Description: INTERVENTIONS:  - Educate patient/family on patient safety including physical limitations  - Instruct patient to call for assistance with activity   - Consider consulting OT/PT to assist with strengthening/mobility based on AM PAC & JH-HLM score  - Consult OT/PT to assist with strengthening/mobility   - Keep Call bell within reach  - Keep bed low and locked with side rails adjusted as appropriate  - Keep  care items and personal belongings within reach  - Initiate and maintain comfort rounds  - Make Fall Risk Sign visible to staff  - Offer Toileting every 2 Hours, in advance of need  - Initiate/Maintain bed alarm  - Obtain necessary fall risk management equipment: yellow socks   - Apply yellow socks and bracelet for high fall risk patients  - Consider moving patient to room near nurses station  Outcome: Progressing  Goal: Maintain or return to baseline ADL function  Description: INTERVENTIONS:  -  Assess patient's ability to carry out ADLs; assess patient's baseline for ADL function and identify physical deficits which impact ability to perform ADLs (bathing, care of mouth/teeth, toileting, grooming, dressing, etc.)  - Assess/evaluate cause of self-care deficits   - Assess range of motion  - Assess patient's mobility; develop plan if impaired  - Assess patient's need for assistive devices and provide as appropriate  - Encourage maximum independence but intervene and supervise when necessary  - Involve family in performance of ADLs  - Assess for home care needs following discharge   - Consider OT consult to assist with ADL evaluation and planning for discharge  - Provide patient education as appropriate  - Monitor functional capacity and physical performance, use of AM PAC & -HLM   - Monitor gait, balance and fatigue with ambulation    Outcome: Progressing  Goal: Maintains/Returns to pre admission functional level  Description: INTERVENTIONS:  - Perform AM-PAC 6 Click Basic Mobility/ Daily Activity assessment daily.  - Set and communicate daily mobility goal to care team and patient/family/caregiver.   - Collaborate with rehabilitation services on mobility goals if consulted  - Perform Range of Motion 4 times a day.  - Reposition patient every 2 hours.  - Dangle patient 3 times a day  - Stand patient 3 times a day  - Ambulate patient 3 times a day  - Out of bed to chair 3 times a day   - Out of bed for meals 3 times  a day  - Out of bed for toileting  - Record patient progress and toleration of activity level   Outcome: Progressing     Problem: DISCHARGE PLANNING  Goal: Discharge to home or other facility with appropriate resources  Description: INTERVENTIONS:  - Identify barriers to discharge w/patient and caregiver  - Arrange for needed discharge resources and transportation as appropriate  - Identify discharge learning needs (meds, wound care, etc.)  - Arrange for interpretive services to assist at discharge as needed  - Refer to Case Management Department for coordinating discharge planning if the patient needs post-hospital services based on physician/advanced practitioner order or complex needs related to functional status, cognitive ability, or social support system  Outcome: Progressing     Problem: Knowledge Deficit  Goal: Patient/family/caregiver demonstrates understanding of disease process, treatment plan, medications, and discharge instructions  Description: Complete learning assessment and assess knowledge base.  Interventions:  - Provide teaching at level of understanding  - Provide teaching via preferred learning methods  Outcome: Progressing     Problem: CARDIOVASCULAR - ADULT  Goal: Maintains optimal cardiac output and hemodynamic stability  Description: INTERVENTIONS:  - Monitor I/O, vital signs and rhythm  - Monitor for S/S and trends of decreased cardiac output  - Administer and titrate ordered vasoactive medications to optimize hemodynamic stability  - Assess quality of pulses, skin color and temperature  - Assess for signs of decreased coronary artery perfusion  - Instruct patient to report change in severity of symptoms  Outcome: Progressing  Goal: Absence of cardiac dysrhythmias or at baseline rhythm  Description: INTERVENTIONS:  - Continuous cardiac monitoring, vital signs, obtain 12 lead EKG if ordered  - Administer antiarrhythmic and heart rate control medications as ordered  - Monitor electrolytes  and administer replacement therapy as ordered  Outcome: Progressing

## 2025-06-06 NOTE — ASSESSMENT & PLAN NOTE
07/20/20 1200   Step 1: Warning signs (thoughts, images, mood, situation, behavior) that a crisis may be developing   1 Being around a large group   2 Physical tension   Step 2: Internal coping strategies - Things I can do to take my mind off my problems without contacting another person (relaxation technique, physical activity)   1 Slow breathing   2 Counting   3 Talking to friends and family   Step 3: People and social settings that provide distraction   1. Name Nadine Julio   Phone in phone   2. Name George Julio   Phone #2 in phone   2. Place Papo Julio   Step 4: People whom I can ask for help   Name #1 Nadine Julio   Phone #1 in phone   Name #2 Papo Julio   Phone #2 in phone   Step 5: Professionals or agencies I can contact during a crisis   1. Clinician Name Carolyn pearson   Clinician Phone [phone]   2. Clinician Name Dr. Giang   Clinician Phone [phone]   [facility] intake   Urgent Care Services Address [address]   Urgent Care Services Phone Mental Health Crisis Line ([phone]) COPE Line ([phone]), [employer] (Dial 211 or [phone]; Text 770-626), [facility] Intake Dept ([phone]).   Suicide Prevention Lifeline Phone: 7-709-868-GJRN (8234) Crisis Text Line (Text \"Home\" to 462-548), [facility]/[employer] [place] [facility] (1-772.856.7370), [facility] (1-883.600.5836).    Step 6: Making the environment safe   1 My bedroom   2 Being with my mom   The one thing that is most important to me and worth living for is   1 Friends and family     Silvestre Baech, [facility]     "Chronic, unstable.  Home medications: Gabapentin 300 mg TID, Metformin 500 mg daily  Hypoglycemic on initial presentation. Suspect secondary to poor PO intake.     Hypoglycemia per \"Hypoglycemia\"  Holding Metformin  Continue Gabapentin  Consider resuming sliding scale when sugars normalize  Hypoglycemia protocol     Lab Results   Component Value Date    HGBA1C 5.2 05/05/2025       Recent Labs     06/05/25  2154   POCGLU 61*       "

## 2025-06-06 NOTE — ASSESSMENT & PLAN NOTE
Initial labs notable for  Mg 1.2: repleted w/ IV mag 2g x1 while in ED.   -Ca 7.7    Replete with additional IV mag 2 mg   Monitor and replete as indicated

## 2025-06-06 NOTE — ASSESSMENT & PLAN NOTE
Noted history of thrombocytopenia.   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  No active signs of bleeding at time of examination.     -Initial labs PLT: 145    Continue anticoagulation with Eliquis in setting of A-fib w/ RVR  Hold home ASA and resume as indicated   Monitor for signs of bleeding

## 2025-06-06 NOTE — OCCUPATIONAL THERAPY NOTE
Occupational Therapy Cancellation Note       06/06/25 1300   Note Type   Note type Cancelled Session   Cancel Reasons Refusal   Additional Comments OT orders received and chart reviewed. Attempted twice to see pt today, once in the AM and again this PM. Pt refusing both attempts, states he does not feel well and wants to stay in bed. Cont to refuse despite encouragement. Will follow.     Cassie Plunkett OTR/L   [place] License # [id number]  [place] License # [id number]

## 2025-06-06 NOTE — ASSESSMENT & PLAN NOTE
Noted history of thrombocytopenia.   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  No active signs of bleeding at time of examination.     Continue anticoagulation with Eliquis in setting of A-fib w/ RVR  Continue aspirin  Monitor for signs of bleeding

## 2025-06-06 NOTE — ASSESSMENT & PLAN NOTE
Reports last drink was 1 pint of liquor on morning of initial presentation.   Not compliant with home medication Naltrexone 50 mg daily.  POA CIWA 9    -Initial EtOH: 346    CIWA  Thiamine, folic acid, multivitamin  Monitor for signs of alcohol withdrawal   Seizure, fall, and aspiration precautions  Monitor telemetry  Holding home Naltrexone

## 2025-06-06 NOTE — ASSESSMENT & PLAN NOTE
Initial labs notable for: AG 20.   Suspect secondary to starvation/dehydration.     IV fluids  Monitor AM labs

## 2025-06-06 NOTE — ASSESSMENT & PLAN NOTE
Per ED report, patient hypoglycemic on EMS assessment.  Initial ED labs notable for BG of 61.   Started D5NS while in ED.  Suspect secondary to poor PO intake     Continue IV D5NS  Accuchecks ACHS  Consider resuming sliding scale when sugars normalize  Hypoglycemia protocol

## 2025-06-06 NOTE — WOUND OSTOMY CARE
Progress Note - Wound   Gregg Partida 65 y.o. male MRN: [id number]  Unit/Bed#: ICU 03 Encounter: [id number]        Assessment:   This is a 65 year old male patient admitted on 6/5/25 with atrial fibrillation with RVR. Patient was received in ICU bed AAO x 3 and agreeable to have wound visit done. He was turned and repositioned with assist of 2 persons. Patient had parkinson to gravity with no record of BM.    Assessment Findings:  1-Full thickness traumatic wounds POA to right lateral lower leg and bilateral knees with no drainage. Orders are in place for wound care.  2-Full thickness traumatic skin tears POA to bilateral arms with moderate sanguinous/serosanguinous drainage. Orders are in place for wound care.  3-Dry black eschar to bilateral toes and right foot with no drainage. There is a healed wound to left posterior ankle with no drainage. Orders are in place for protection.  4-Bilateral sacrobuttocks and heels are intact - orders are in place for skin care and for prevention.    Wound Care Plan:  1-Apply VASHE soaked gauze to bilateral arm wounds x 5 minutes and pat dry. Apply Normlgel to wounds, Adaptic cut to size of wounds, 1/2 ABD, rolled gauze & tape. Change every other day & prn soilage/dislodgement.  2-Apply VASHE soaked guaze to bilateral knee wounds and right lateral leg wound x 5 minutes and pat dry. Apply Betadine to wounds, ABD pad, rolled gauze and tape. Change every other day & prn soilage/dislodgement.  3-Cleanse scabs to bilateral toes/feet and healed wound to left posterior ankle with VASHE solution and pat dry. Apply 3M No Sting daily for protection.  4-Apply Prevent barrier cream to bilateral sacrum, buttock and heels BID and PRN  5-Float heels on 2 pillows to offload pressure so heels are not in contact with mattress or pillows.  6-Ehob pressure redistribution cushion in chair when out of bed if able. Avoid prolonged sitting.  7-Moisturize skin daily with skin nourishing  cream.  8-Turn/reposition q2h or when medically stable for pressure re-distribution on skin.     Wound 05/05/25 Leg Distal;Right;Lateral (Active)   Wound Image   06/06/25 1022   Wound Description Dry;Black;Eschar 06/06/25 1022   Non-staged Wound Description Full thickness 06/06/25 1022   Wound Length (cm) 5 cm 06/06/25 1022   Wound Width (cm) 0.5 cm 06/06/25 1022   Wound Depth (cm) 0.1 cm 06/06/25 1022   Wound Surface Area (cm^2) 1.96 cm^2 06/06/25 1022   Wound Volume (cm^3) 0.131 cm^3 06/06/25 1022   Calculated Wound Volume (cm^3) 0.25 cm^3 06/06/25 1022   Change in Wound Size % -177.78 06/06/25 1022   Drainage Amount None 06/06/25 1022   Babita-wound Assessment Intact;Scar Tissue 06/06/25 1022   Treatments Cleansed 06/06/25 1022   Dressing Betadine;ABD;Dry dressing 06/06/25 1022   Dressing Changed New 06/06/25 1022   Dressing Status Clean;Dry;Intact 06/06/25 1022       Wound 05/07/25  Arm Right;Posterior (Active)   Wound Image   06/06/25 1034   Wound Description Granulation tissue;Beefy red;Bleeding 06/06/25 1034   Non-staged Wound Description Partial thickness 06/06/25 1034   Wound Length (cm) 0.2 cm 06/06/25 1034   Wound Width (cm) 1 cm 06/06/25 1034   Wound Depth (cm) 0.1 cm 06/06/25 1034   Wound Surface Area (cm^2) 0.16 cm^2 06/06/25 1034   Wound Volume (cm^3) 0.01 cm^3 06/06/25 1034   Calculated Wound Volume (cm^3) 0.02 cm^3 06/06/25 1034   Drainage Amount Moderate 06/06/25 1034   Drainage Description Sanguineous 06/06/25 1034   Babita-wound Assessment Intact;Hyperpigmented 06/06/25 1034   Treatments Cleansed 06/06/25 1034   Dressing Calcium Alginate with Silver;ABD;Adaptic (due to bleeding) 06/06/25 1034   Dressing Changed New 06/06/25 1034   Dressing Status Clean;Dry;Intact 06/06/25 1034       Wound Knee Anterior;Left (Active)   Wound Image   06/06/25 1048   Wound Description Brown;Eschar;Yellow;Slough 06/06/25 1048   Non-staged Wound Description Full thickness 06/06/25 1048   Wound Length (cm) 10 cm 06/06/25  1048   Wound Width (cm) 6 cm 06/06/25 1048   Wound Depth (cm) 0.1 cm 06/06/25 1048   Wound Surface Area (cm^2) 47.12 cm^2 06/06/25 1048   Wound Volume (cm^3) 3.142 cm^3 06/06/25 1048   Calculated Wound Volume (cm^3) 6 cm^3 06/06/25 1048   Change in Wound Size % 61.29 06/06/25 1048   Drainage Amount None 06/06/25 1048   Babita-wound Assessment Intact;Erythema 06/06/25 1048   Treatments Cleansed 06/06/25 1048   Dressing ABD;Dry dressing 06/06/25 1048   Dressing Changed New 06/06/25 1048   Dressing Status Clean;Dry;Intact 06/06/25 1048       Wound 05/24/25 Knee Anterior;Right (Active)   Wound Image   06/06/25 1024   Wound Description Dry;Black;Eschar;Yellow;Slough 06/06/25 1024   Wound Length (cm) 1175 cm 06/06/25 1024   Wound Width (cm) 5 cm 06/06/25 1024   Wound Depth (cm) 0.1 cm 06/06/25 1024   Wound Surface Area (cm^2) 4614.21 cm^2 06/06/25 1024   Wound Volume (cm^3) 307.614 cm^3 06/06/25 1024   Calculated Wound Volume (cm^3) 587.5 cm^3 06/06/25 1024   Change in Wound Size % -7243.75 06/06/25 1024   Drainage Amount None 06/06/25 1024   Babita-wound Assessment Intact;Erythema 06/06/25 1024   Treatments Cleansed 06/06/25 1024   Dressing Betadine;ABD;Dry dressing; 06/06/25 1024   Dressing Changed New 06/06/25 1024   Dressing Status Clean;Dry;Intact 06/06/25 1024       Wound 05/26/25 Traumatic Skin Tear Arm Left;Lower;Lateral (Active)   Wound Image   06/06/25 1053   Wound Description Granulation tissue;Pink;Slough;Yellow 06/06/25 1053   Non-staged Wound Description Full thickness 06/06/25 1053   Wound Length (cm) 13 cm 06/06/25 1053   Wound Width (cm) 3 cm 06/06/25 1053   Wound Depth (cm) 0.1 cm 06/06/25 1053   Wound Surface Area (cm^2) 30.63 cm^2 06/06/25 1053   Wound Volume (cm^3) 2.042 cm^3 06/06/25 1053   Calculated Wound Volume (cm^3) 3.9 cm^3 06/06/25 1053   Change in Wound Size % 14.29 06/06/25 1053   Drainage Amount Moderate 06/06/25 1053   Drainage Description Serosanguineous 06/06/25 1053   Babita-wound Assessment  Intact;Hyperpigmented 06/06/25 1053   Treatments Cleansed 06/06/25 1053   Dressing Normlgel;Adaptic;ABD;Dry dressing 06/06/25 1053   Dressing Changed New 06/06/25 1053   Dressing Status Clean;Dry;Intact 06/06/25 1053   Wound 06/06/25 Foot Anterior;Right (Active)   Wound Image   06/06/25 1106   Wound Description Dry;Black;Eschar 06/06/25 1106   Non-staged Wound Description Full thickness 06/06/25 1106   Wound Length (cm) 0.3 cm 06/06/25 1106   Wound Width (cm) 0.3 cm 06/06/25 1106   Wound Depth (cm) 0.1 cm 06/06/25 1106   Wound Surface Area (cm^2) 0.07 cm^2 06/06/25 1106   Wound Volume (cm^3) 0.005 cm^3 06/06/25 1106   Calculated Wound Volume (cm^3) 0.01 cm^3 06/06/25 1106   Drainage Amount None 06/06/25 1106   Treatments Cleansed 06/06/25 1106   Dressing 3M No Sting 06/06/25 1106   Dressing Changed New 06/06/25 1106   Dressing Status Intact 06/06/25 1106     Discussed assessment findings, and plan of care/recommendations with Dottie SORIANO.    Wound care will follow along with patient weekly, please call or text with questions and concerns.    Recommendations written as orders.  Iraida Galvan MSN, RN, CWON

## 2025-06-06 NOTE — H&P
H&P - Family Medicine   Name: Gregg Partida 65 y.o. male I MRN: [id number]  Unit/Bed#: ED 09 I Date of Admission: 6/5/2025   Date of Service: 6/5/2025 I Hospital Day: 0     Assessment & Plan  Atrial fibrillation with RVR (HCC)  Presented to ED after alcohol intoxication and reported fall.   EKG consistent with A-fib w/ RVR. No associated symptoms   Suspect secondary to medication noncompliance and alcohol abuse   Home medications: Eliquis 5 mg BID, Metoprolol 100 mg BID, Cardizem 120 mg BID.     -CHADSVASC score: 3  -EKG: A-fib w/ RVR, rate 166    ED course: IV Cardizem 10 mg x1, D5NS, Cardizem drip, IV mag 2g x1    Cardizem drip   Continue Eliquis and Metoprolol as above  Hold home Cardizem and resume as indicated   CXR ordered, final read pending  Telemetry  IV fluids  Continue to monitor  Alcohol intoxication (HCC)  Reports last drink was 1 pint of liquor on morning of initial presentation.   Not compliant with home medication Naltrexone 50 mg daily.  POA CIWA 9    -Initial EtOH: 346    CIWA  Thiamine, folic acid, multivitamin  Monitor for signs of alcohol withdrawal   Seizure, fall, and aspiration precautions  Monitor telemetry  Holding home Naltrexone  Fall  Reported ground level fall on afternoon of initial presentation. Denies preceding headache, dizziness, lightheadedness.   Noted history of multiple falls, after drinking alcohol.     -CT head: No acute intracranial abnormality.  -CT spine: No cervical spine fracture or traumatic malalignment.     Fall precautions  PT/OT  Recurrent Clostridioides difficile diarrhea  History of recurrent C.diff infection.   Most recently diagnosed during 5/8 hospitalization. Started on PO Vancomycin 125 mg Q6h on 5/8.   Reports did not take medications in the last few days.  Denies diarrhea, but covered in feces at the time of admission     Resume PO Vancomycin   Contact precautions  Repeat C.diff panel  Probiotics  Continue to monitor  Increased anion gap  Initial labs  "notable for: AG 20.   Suspect secondary to starvation/dehydration.     IV fluids  Monitor AM labs  Electrolyte abnormality  Initial labs notable for  Mg 1.2: repleted w/ IV mag 2g x1 while in ED.   -Ca 7.7    Replete with additional IV mag 2 mg   Monitor and replete as indicated  Elevated troponin  Troponin 0h 70 -> 2h 173  Patient asymptomatic at time of initial assessment.   Suspect secondary to Type II MI in setting of A-fib w/ RVR     Monitor serial troponins  Hypoglycemia  Per ED report, patient hypoglycemic on EMS assessment.  Initial ED labs notable for BG of 61.   Started D5NS while in ED.  Suspect secondary to poor PO intake     Continue IV D5NS  Accuchecks ACHS  Consider resuming sliding scale when sugars normalize  Hypoglycemia protocol   CAD (coronary artery disease)  CAD s/p CABG in 2004.   Home medications: ASA 81 mg, Ranexa 500 mg BID,   No chest pain endorsed at time of initial assessment.     Holding ASA as per Thrombocytopenia  Continue Ranexa  Thrombocytopenia (HCC)  Noted history of thrombocytopenia.   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  No active signs of bleeding at time of examination.     -Initial labs PLT: 145    Continue anticoagulation with Eliquis in setting of A-fib w/ RVR  Hold home ASA and resume as indicated   Monitor for signs of bleeding  Transaminitis  Initial labs notable for: , ALT 65  Suspect secondary to acute on chronic alcohol use.     Holding home Lipitor, resume as indicated   Continue to monitor   Type 2 diabetes mellitus, without long-term current use of insulin (HCC)  Chronic, unstable.  Home medications: Gabapentin 300 mg TID, Metformin 500 mg daily  Hypoglycemic on initial presentation. Suspect secondary to poor PO intake.     Hypoglycemia per \"Hypoglycemia\"  Holding Metformin  Continue Gabapentin  Consider resuming sliding scale when sugars normalize  Hypoglycemia protocol     Lab Results   Component Value Date    HGBA1C 5.2 " "05/05/2025       Recent Labs     06/05/25  2154   POCGLU 61*       COPD (chronic obstructive pulmonary disease) (HCC)  Chronic, stable.   Home medications: Breo-Elipta daily  No signs of acute exacerbation on initial presentation.    Continue home medications  History of prostate cancer  Chronic, stable.   S/p prostate resection in 2021.   Home medications: Flomax 0.4 mg     Continue home medications  Chronic heart failure with preserved ejection fraction (HCC)  Chronic, stable.   Patient appears euvolemic on initial assessment.   -Initial labs notable for:     Monitor fluid status in setting of IV fluids    Wt Readings from Last 3 Encounters:   05/28/25 76 kg (167 lb 8.8 oz)   05/13/25 79.1 kg (174 lb 4.8 oz)   05/08/25 86 kg (189 lb 9.5 oz)       VTE Pharmacologic Prophylaxis: VTE Score: 4 Moderate Risk (Score 3-4) - Pharmacological DVT Prophylaxis Ordered: apixaban (Eliquis).  Code Status: Level 1 - Full Code  Discussion with family: Patient declined call to .     Anticipated Length of Stay: Patient will be admitted on an inpatient basis with an anticipated length of stay of greater than 2 midnights secondary to A-fib w/ RVR and alcohol withdrawal.    History of Present Illness   Chief Complaint: \"I fell\"    Gregg Partida is a 65 y.o. male with a PMH of A-fib, alcohol use disorder, CAD s/p CABG, CHF who presents after a fall at home. Reports falling earlier on the day of initial presentation. Denies hitting head, preceding dizziness/headache/syncope. He reports his last drink was earlier on the day of initial presentation, where he drank 1 pint of liquor. He was found on the floor by his neighbor. Reports he was on the floor for about 30 minutes. He reports not having eaten anything in the last 2 days. He also reports not taking medications in the last 2 days due to dropping them under a cabinet.  Per EMS, he was found to be hypoglycemic, and in A-fib w/ RVR.   At time of initial " assessment, patient is awake, alert, and oriented. Denies chest pain, headache, palpitations, nausea/vomiting, constipation/diarrhea. He notes he is covered in feces and does not know why.    Review of Systems   Constitutional:  Negative for chills and fever.   HENT:  Negative for congestion and sore throat.    Eyes:  Negative for pain and visual disturbance.   Respiratory:  Negative for cough, chest tightness and shortness of breath.    Cardiovascular:  Negative for chest pain, palpitations and leg swelling.   Gastrointestinal:  Negative for abdominal pain, constipation, diarrhea, nausea and vomiting.   Genitourinary:  Negative for dysuria and hematuria.   Musculoskeletal:  Negative for arthralgias and back pain.   Skin:  Negative for color change and rash.   Neurological:  Negative for dizziness, seizures, syncope, light-headedness and headaches.   Psychiatric/Behavioral:  Negative for confusion and hallucinations.    All other systems reviewed and are negative.      Historical Information   Past Medical History[1]  Past Surgical History[2]  Social History[3]  E-Cigarette/Vaping    E-Cigarette Use Never User      E-Cigarette/Vaping Substances    Nicotine Yes     THC No     CBD No     Flavoring No     Other No     Unknown No          Social History:  Marital Status: Single   Occupation: Unemployed  Patient Pre-hospital Living Situation: Home  Patient Pre-hospital Level of Mobility: walks  Patient Pre-hospital Diet Restrictions: Cardiac, diabetic    Meds/Allergies   I have reviewed home medications using recent Epic encounter.  Prior to Admission medications    Medication Sig Start Date End Date Taking? Authorizing Provider   albuterol (PROVENTIL HFA,VENTOLIN HFA) 90 mcg/act inhaler Inhale 2 puffs every 6 (six) hours as needed for wheezing or shortness of breath  Patient not taking: Reported on 6/5/2025 5/29/25   Nicky Messer DO   aluminum-magnesium hydroxide 200-200 MG/5ML suspension Take 5 mL by mouth every 6  (six) hours as needed for heartburn  Patient not taking: Reported on 6/5/2025 5/29/25   Nicky Messer DO   apixaban (Eliquis) 5 mg Take 1 tablet (5 mg total) by mouth in the morning and 1 tablet (5 mg total) in the evening.  Patient not taking: Reported on 6/5/2025 5/29/25   Nicky Messer, DO   aspirin 81 mg chewable tablet Chew 1 tablet (81 mg total) in the morning.  Patient not taking: Reported on 6/5/2025 5/29/25   Nicky Messer, DO   atorvastatin (LIPITOR) 40 mg tablet Take 1 tablet (40 mg total) by mouth in the morning.  Patient not taking: Reported on 6/5/2025 5/29/25   Nicky Messer DO   Blood Glucose Monitoring Suppl (ONE TOUCH ULTRA MINI) w/Device KIT Use as directed  Patient not taking: Reported on 6/5/2025 5/16/19   Historical Provider, MD   Blood Pressure Monitoring (Adult Blood Pressure Cuff Lg) KIT Use in the morning  Patient not taking: Reported on 6/5/2025 12/14/23   Elliott Zimmerman MD   diltiazem (CARDIZEM SR) 120 mg 12 hr capsule Take 1 capsule (120 mg total) by mouth in the morning and 1 capsule (120 mg total) in the evening.  Patient not taking: Reported on 6/5/2025 5/29/25   Nicky Messer DO   ferrous sulfate 325 (65 Fe) mg tablet Take 1 tablet (325 mg total) by mouth daily with breakfast  Patient not taking: Reported on 6/5/2025 5/29/25   Nicky Messer, DO   fluticasone-vilanterol 200-25 mcg/actuation inhaler Inhale 1 puff in the morning. Rinse mouth after use.  Patient not taking: Reported on 6/5/2025 5/29/25   Nicky Messer, DO   folic acid (FOLVITE) 400 mcg tablet Take 1 tablet (400 mcg total) by mouth in the morning.  Patient not taking: Reported on 6/5/2025 5/29/25   Nicky Messer DO   gabapentin (NEURONTIN) 300 mg capsule Take 1 capsule (300 mg total) by mouth in the morning and 1 capsule (300 mg total) in the evening and 1 capsule (300 mg total) before bedtime.  Patient not taking: Reported on 6/5/2025 5/29/25   Nicky Messer DO   lidocaine (LIDODERM)  5 % Apply 1 patch over 12 hours topically in the morning. Remove & Discard patch within 12 hours or as directed by MD.  Patient not taking: Reported on 6/5/2025 5/29/25   Nicky Messer DO   magnesium oxide-pyridoxine (BEELITH) 362-20 MG TABS Take 1 tablet by mouth in the morning.  Patient not taking: Reported on 6/5/2025 5/29/25   Nicky Messer DO   metFORMIN (GLUCOPHAGE) 500 mg tablet Take 1 tablet (500 mg total) by mouth daily with breakfast  Patient not taking: Reported on 6/5/2025 5/29/25   Nikcy Messer DO   metoprolol succinate (TOPROL-XL) 100 mg 24 hr tablet Take 1 tablet (100 mg total) by mouth daily  Patient not taking: Reported on 6/5/2025 5/29/25   Nicky Messer DO   naltrexone (REVIA) 50 mg tablet Take 1 tablet (50 mg total) by mouth in the morning.  Patient not taking: Reported on 6/5/2025 5/29/25   Nicky Messer DO   nicotine (NICODERM CQ) 21 mg/24 hr TD 24 hr patch Place 1 patch on the skin every 24 hours  Patient not taking: Reported on 6/5/2025    Historical Provider, MD   ONETOUCH DELICA LANCETS FINE MISC in the morning and in the evening and before bedtime. Test.  Patient not taking: Reported on 6/5/2025 5/16/19   Historical Provider, MD   pantoprazole (PROTONIX) 40 mg tablet Take 1 tablet (40 mg total) by mouth in the morning.  Patient not taking: Reported on 6/5/2025 5/29/25   Nicky Messer DO   ranolazine (RANEXA) 500 mg 12 hr tablet Take 1 tablet (500 mg total) by mouth in the morning and 1 tablet (500 mg total) in the evening.  Patient not taking: Reported on 6/5/2025 5/29/25   Nicky Messer DO   saccharomyces boulardii (FLORASTOR) 250 mg capsule Take 1 capsule (250 mg total) by mouth in the morning and 1 capsule (250 mg total) in the evening.  Patient not taking: Reported on 6/5/2025 5/29/25   DO stacey Haji-docusate sodium (SENOKOT-S) 8.6-50 mg per tablet Take 1 tablet by mouth in the morning.  Patient not taking: Reported on 6/5/2025 5/29/25    Nicky Messer DO   tamsulosin (FLOMAX) 0.4 mg Take 1 capsule (0.4 mg total) by mouth daily with dinner  Patient not taking: Reported on 6/5/2025 5/29/25   Nicky DO Ayde   Thiamine Mononitrate (VITAMIN B1) 100 mg tablet Take 1 tablet (100 mg total) by mouth in the morning.  Patient not taking: Reported on 6/5/2025 5/29/25   Nicky DO Ayde   vancomycin (VANCOCIN) 125 MG capsule Take 1 capsule (125 mg total) by mouth every 6 (six) hours for 10 days  Patient not taking: Reported on 6/5/2025 5/29/25 6/8/25  Nicky DO Ayde     No Known Allergies    Objective :  Temp:  [97.6 °F (36.4 °C)] 97.6 °F (36.4 °C)  HR:  [125-155] 130  BP: (129-169)/(66-89) 134/86  Resp:  [14-19] 14  SpO2:  [93 %-95 %] 95 %  O2 Device: None (Room air)    Physical Exam  Vitals reviewed.   Constitutional:       General: He is not in acute distress.     Appearance: He is not ill-appearing.      Comments: Covered in feces, bruises, disheveled appearing   HENT:      Head: Normocephalic and atraumatic.      Right Ear: External ear normal.      Left Ear: External ear normal.      Nose: Nose normal. No congestion.      Mouth/Throat:      Mouth: Mucous membranes are dry.      Pharynx: Oropharynx is clear.     Eyes:      Extraocular Movements: Extraocular movements intact.      Conjunctiva/sclera: Conjunctivae normal.       Cardiovascular:      Rate and Rhythm: Tachycardia present. Rhythm irregular.      Heart sounds: Normal heart sounds.   Pulmonary:      Effort: Pulmonary effort is normal. No respiratory distress.      Breath sounds: Normal breath sounds. No wheezing.   Abdominal:      General: Abdomen is flat. There is no distension.      Palpations: Abdomen is soft.      Tenderness: There is no abdominal tenderness.     Musculoskeletal:         General: No signs of injury.      Cervical back: Normal range of motion and neck supple.      Right lower leg: No edema.      Left lower leg: No edema.     Skin:     General: Skin is warm  and dry.     Neurological:      Mental Status: He is alert and oriented to person, place, and time.      Motor: Tremor present.     Psychiatric:         Behavior: Behavior normal.         Thought Content: Thought content normal.          Lines/Drains:  NONE          Lab Results: I have reviewed the following results:  Results from last 7 days   Lab Units 06/05/25  2147   WBC Thousand/uL 6.83   HEMOGLOBIN g/dL 9.7*   HEMATOCRIT % 31.2*   PLATELETS Thousands/uL 145*   SEGS PCT % 83*   LYMPHO PCT % 10*   MONO PCT % 4   EOS PCT % 0     Results from last 7 days   Lab Units 06/05/25  2147   SODIUM mmol/L 143   POTASSIUM mmol/L 4.1   CHLORIDE mmol/L 102   CO2 mmol/L 21   BUN mg/dL 12   CREATININE mg/dL 1.11   ANION GAP mmol/L 20*   CALCIUM mg/dL 7.7*   ALBUMIN g/dL 3.6   TOTAL BILIRUBIN mg/dL 0.81   ALK PHOS U/L 166*   ALT U/L 65*   AST U/L 124*   GLUCOSE RANDOM mg/dL 64*         Results from last 7 days   Lab Units 06/05/25  2154   POC GLUCOSE mg/dl 61*     Lab Results   Component Value Date    HGBA1C 5.2 05/05/2025    HGBA1C 5.4 02/15/2025    HGBA1C 5.2 11/14/2024                 Administrative Statements       ** Please Note: This note has been constructed using a voice recognition system. **         [1]   Past Medical History:  Diagnosis Date    Acute on chronic kidney failure  (HCC)     Alcohol abuse     Alcohol withdrawal (HCC) 10/9/2018    Atrial fibrillation (HCC)     Cancer (HCC)     prostate ca,had radiation    Cardiac disease     stents,then triple bypass    COPD (chronic obstructive pulmonary disease) (HCC)     Coronary artery disease     ETOH abuse     Heart failure (HCC)     History of heart surgery     says triple bypass [facility]    Hx of heart artery stent     2014    Hyperlipidemia     Hypertension     Hyponatremia 10/17/2019    Hypovolemic shock (HCC) 12/22/2019    Lumbar spondylitis (HCC) 10/13/2022    Metabolic acidosis, increased anion gap 04/21/2021    Nasal bone fracture 10/10/2022    Prostate  "[facility]     S/P CABG x 3     2004    Sleep apnea    [2]   Past Surgical History:  Procedure Laterality Date    CARDIAC CATHETERIZATION      2 stents    CORONARY ARTERY BYPASS GRAFT      CORONARY ARTERY BYPASS GRAFT  2004    [place] ARTHRD ANT INTERBODY MIN DSC CRV BELOW C2 N/A 12/16/2020    Procedure: Anterior cervical discectomy with fusion C4-C7; Posterior cervical decompression and fusion C2-T2;  Surgeon: David Rowell MD;  Location: [facility];  Service: Neurosurgery    TONSILLECTOMY     [3]   Social History  Tobacco Use    Smoking status: Every Day     Current packs/day: 1.50     Average packs/day: 1.5 packs/day for 40.0 years (60.0 ttl pk-yrs)     Types: Cigarettes    Smokeless tobacco: Never   Vaping Use    Vaping status: Never Used   Substance and Sexual Activity    Alcohol use: Yes     Alcohol/week: 4.0 standard drinks of alcohol     Types: 4 Standard drinks or equivalent per week     Comment: \"quart a day\"    Drug use: No    Sexual activity: Not Currently     "

## 2025-06-06 NOTE — ASSESSMENT & PLAN NOTE
Chronic, stable.   S/p prostate resection in 2021.   Home medications: Flomax 0.4 mg     Continue home medications

## 2025-06-06 NOTE — ASSESSMENT & PLAN NOTE
Initial labs notable for: , ALT 65  Suspect secondary to acute on chronic alcohol use.     Holding home Lipitor, resume as indicated   Continue to monitor

## 2025-06-06 NOTE — ASSESSMENT & PLAN NOTE
Reported ground level fall on afternoon of initial presentation. Denies preceding headache, dizziness, lightheadedness.   Noted history of multiple falls, after drinking alcohol.     -CT head: No acute intracranial abnormality.  -CT spine: No cervical spine fracture or traumatic malalignment.     Fall precautions  PT/OT

## 2025-06-06 NOTE — ASSESSMENT & PLAN NOTE
Initial labs notable for  Mg 1.2: repleted w/ IV mag 2g x1 while in ED.     Monitor and replete as indicated

## 2025-06-06 NOTE — ASSESSMENT & PLAN NOTE
Initial labs notable for: , ALT 65,   Suspect secondary to acute on chronic alcohol use.     Improving  6/7 : AST 61, ALT 43,     Continue with Lipitor  Continue to monitor

## 2025-06-06 NOTE — ASSESSMENT & PLAN NOTE
Presented to ED after alcohol intoxication and reported fall.   EKG consistent with A-fib w/ RVR. No associated symptoms   Suspect secondary to medication noncompliance and alcohol abuse   Home medications: Eliquis 5 mg BID, Metoprolol 100 mg BID, Cardizem 120 mg BID.     -CHADSVASC score: 3  -EKG: A-fib w/ RVR, rate 166    ED course: IV Cardizem 10 mg x1, D5NS, Cardizem drip, IV mag 2g x1    Cardizem drip   Continue Eliquis and Metoprolol as above  Hold home Cardizem and resume as indicated   CXR ordered, final read pending  Telemetry  IV fluids  Continue to monitor

## 2025-06-06 NOTE — ASSESSMENT & PLAN NOTE
History of recurrent C.diff infection.   Most recently diagnosed during 5/8 hospitalization. Started on PO Vancomycin 125 mg Q6h on 5/8.   Reports did not take medications in the last few days.  Denies diarrhea, but covered in feces at the time of admission     Resume PO Vancomycin   Contact precautions  Repeat C.diff panel  Probiotics  Continue to monitor

## 2025-06-06 NOTE — DISCHARGE INSTR - OTHER ORDERS
Wound Care Plan:  1-Apply VASHE soaked gauze to bilateral arm wounds x 5 minutes and pat dry. Apply Normlgel to wounds, Adaptic cut to size of wounds, 1/2 ABD, rolled gauze & tape. Change 3x/week & as needed for soilage/dislodgement.  2-Apply VASHE soaked guaze to bilateral knee wounds and right lateral leg wound x 5 minutes and pat dry. Apply Betadine to wounds, ABD pad, rolled gauze and tape. Change 3x/week & as needed for soilage/dislodgement.  3-Cleanse scabs to bilateral toes/feet and healed wound to left posterior ankle with VASHE solution and pat dry. Apply 3M No Sting daily for protection.  4-Apply Prevent barrier cream or equivalent to bilateral sacrum, buttock and heels 2x/day and as needed.  5-Float heels on 2 pillows in bed to offload pressure so heels are not in contact with mattress or pillows.  6-Use pressure redistribution cushion in chair when out of bed if able. Avoid prolonged sitting.  7-Moisturize skin daily with skin nourishing cream.  8-Turn/reposition every 2 hours in bed and every hour when out of bed to chair for pressure re-distribution on skin.   9-You will receive a call from Central Scheduling to follow up at [facility].

## 2025-06-06 NOTE — PHYSICAL THERAPY NOTE
PT cancellation       06/06/25 0900   PT Last Visit   PT Visit Date 06/06/25   Note Type   Note type Cancelled Session   Cancel Reasons Refusal   Additional Comments Attempted PT evaluation however pt declined stating that he just doesn't feel well at all. Will follow.     Cassie Dominguez PT  [id number]

## 2025-06-06 NOTE — ED CARE HANDOFF
Emergency Department Sign Out Note        Sign out and transfer of care from Dr. Madden. See Separate Emergency Department note.     The patient, Gregg Partida, was evaluated by the previous provider for Afib with rvr, alcohol intox, fall.    Workup Completed:    Labs pending a CT    ED Course / Workup Pending (followup):  CT was negative. \  [facility] to admit.          No data recorded                             Procedures  Medical Decision Making  Amount and/or Complexity of Data Reviewed  Labs: ordered.  Radiology: ordered and independent interpretation performed.    Risk  Prescription drug management.  Decision regarding hospitalization.            Disposition  Final diagnoses:   Atrial fibrillation (HCC)   Hypoglycemia   Hypomagnesemia   Alcohol intoxication (HCC)     Time reflects when diagnosis was documented in both MDM as applicable and the Disposition within this note       Time User Action Codes Description Comment    6/5/2025 10:40 PM Anepu, Trish Add [I48.91] Atrial fibrillation (HCC)     6/5/2025 10:40 PM Anepu, Trish Add [E16.2] Hypoglycemia     6/5/2025 10:40 PM Anepu, Trish Add [E83.42] Hypomagnesemia     6/5/2025 10:41 PM Anepu, Trish Add [F10.929] Alcohol intoxication (HCC)           ED Disposition       ED Disposition   Admit    Condition   Stable    Date/Time   Thu Jun 5, 2025 11:28 PM    Comment   Case was discussed with [facility] medicine Resident Dr. Villanueva and the patient's admission status was agreed to be Admission Status: inpatient status to the service of [facility] practice .               Follow-up Information    None       Patient's Medications   Discharge Prescriptions    No medications on file     No discharge procedures on file.       ED Provider  Electronically Signed by     Seven Severino MD  06/05/25 0494

## 2025-06-06 NOTE — ASSESSMENT & PLAN NOTE
Chronic, stable.   Patient appears euvolemic on initial assessment.   -Initial labs notable for:     Monitor fluid status in setting of IV fluids    Wt Readings from Last 3 Encounters:   05/28/25 76 kg (167 lb 8.8 oz)   05/13/25 79.1 kg (174 lb 4.8 oz)   05/08/25 86 kg (189 lb 9.5 oz)

## 2025-06-07 ENCOUNTER — APPOINTMENT (INPATIENT)
Dept: RADIOLOGY | Facility: HOSPITAL | Age: 65
DRG: 308 | End: 2025-06-07
Payer: COMMERCIAL

## 2025-06-07 PROBLEM — R91.8 OPACITY OF LUNG ON IMAGING STUDY: Status: ACTIVE | Noted: 2025-06-07

## 2025-06-07 PROBLEM — J98.11 ATELECTASIS: Status: ACTIVE | Noted: 2025-06-07

## 2025-06-07 PROBLEM — G93.41 ACUTE METABOLIC ENCEPHALOPATHY: Status: ACTIVE | Noted: 2025-06-07

## 2025-06-07 PROBLEM — E87.29 HIGH ANION GAP METABOLIC ACIDOSIS: Status: RESOLVED | Noted: 2021-04-21 | Resolved: 2025-06-07

## 2025-06-07 LAB
ALBUMIN SERPL BCG-MCNC: 3.3 G/DL (ref 3.5–5)
ALP SERPL-CCNC: 148 U/L (ref 34–104)
ALT SERPL W P-5'-P-CCNC: 43 U/L (ref 7–52)
ANION GAP SERPL CALCULATED.3IONS-SCNC: 13 MMOL/L (ref 4–13)
ANISOCYTOSIS BLD QL SMEAR: PRESENT
AST SERPL W P-5'-P-CCNC: 61 U/L (ref 13–39)
BASOPHILS # BLD AUTO: 0.04 THOUSANDS/ÂΜL (ref 0–0.1)
BASOPHILS NFR BLD AUTO: 1 % (ref 0–1)
BILIRUB SERPL-MCNC: 1.67 MG/DL (ref 0.2–1)
BUN SERPL-MCNC: 13 MG/DL (ref 5–25)
CALCIUM ALBUM COR SERPL-MCNC: 8.7 MG/DL (ref 8.3–10.1)
CALCIUM SERPL-MCNC: 8.1 MG/DL (ref 8.4–10.2)
CHLORIDE SERPL-SCNC: 100 MMOL/L (ref 96–108)
CO2 SERPL-SCNC: 21 MMOL/L (ref 21–32)
CREAT SERPL-MCNC: 1.25 MG/DL (ref 0.6–1.3)
EOSINOPHIL # BLD AUTO: 0.03 THOUSAND/ÂΜL (ref 0–0.61)
EOSINOPHIL NFR BLD AUTO: 1 % (ref 0–6)
ERYTHROCYTE [DISTWIDTH] IN BLOOD BY AUTOMATED COUNT: 17.2 % (ref 11.6–15.1)
GFR SERPL CREATININE-BSD FRML MDRD: 60 ML/MIN/1.73SQ M
GLUCOSE SERPL-MCNC: 111 MG/DL (ref 65–140)
GLUCOSE SERPL-MCNC: 126 MG/DL (ref 65–140)
GLUCOSE SERPL-MCNC: 130 MG/DL (ref 65–140)
GLUCOSE SERPL-MCNC: 139 MG/DL (ref 65–140)
GLUCOSE SERPL-MCNC: 151 MG/DL (ref 65–140)
HCT VFR BLD AUTO: 27.6 % (ref 36.5–49.3)
HGB BLD-MCNC: 8.6 G/DL (ref 12–17)
IMM GRANULOCYTES # BLD AUTO: 0.02 THOUSAND/UL (ref 0–0.2)
IMM GRANULOCYTES NFR BLD AUTO: 0 % (ref 0–2)
LYMPHOCYTES # BLD AUTO: 0.93 THOUSANDS/ÂΜL (ref 0.6–4.47)
LYMPHOCYTES NFR BLD AUTO: 20 % (ref 14–44)
MCH RBC QN AUTO: 30.8 PG (ref 26.8–34.3)
MCHC RBC AUTO-ENTMCNC: 31.2 G/DL (ref 31.4–37.4)
MCV RBC AUTO: 99 FL (ref 82–98)
MONOCYTES # BLD AUTO: 0.38 THOUSAND/ÂΜL (ref 0.17–1.22)
MONOCYTES NFR BLD AUTO: 8 % (ref 4–12)
NEUTROPHILS # BLD AUTO: 3.32 THOUSANDS/ÂΜL (ref 1.85–7.62)
NEUTS SEG NFR BLD AUTO: 70 % (ref 43–75)
NRBC BLD AUTO-RTO: 2 /100 WBCS
PLATELET # BLD AUTO: 70 THOUSANDS/UL (ref 149–390)
PLATELET BLD QL SMEAR: ABNORMAL
PMV BLD AUTO: 9.7 FL (ref 8.9–12.7)
POTASSIUM SERPL-SCNC: 4.3 MMOL/L (ref 3.5–5.3)
PROT SERPL-MCNC: 6.5 G/DL (ref 6.4–8.4)
RBC # BLD AUTO: 2.79 MILLION/UL (ref 3.88–5.62)
RBC MORPH BLD: PRESENT
SODIUM SERPL-SCNC: 134 MMOL/L (ref 135–147)
WBC # BLD AUTO: 4.72 THOUSAND/UL (ref 4.31–10.16)

## 2025-06-07 PROCEDURE — 94640 AIRWAY INHALATION TREATMENT: CPT

## 2025-06-07 PROCEDURE — 85025 COMPLETE CBC W/AUTO DIFF WBC: CPT

## 2025-06-07 PROCEDURE — 94760 N-INVAS EAR/PLS OXIMETRY 1: CPT

## 2025-06-07 PROCEDURE — 99291 CRITICAL CARE FIRST HOUR: CPT | Performed by: INTERNAL MEDICINE

## 2025-06-07 PROCEDURE — 94669 MECHANICAL CHEST WALL OSCILL: CPT

## 2025-06-07 PROCEDURE — 94668 MNPJ CHEST WALL SBSQ: CPT

## 2025-06-07 PROCEDURE — 94664 DEMO&/EVAL PT USE INHALER: CPT

## 2025-06-07 PROCEDURE — 80053 COMPREHEN METABOLIC PANEL: CPT

## 2025-06-07 PROCEDURE — 82948 REAGENT STRIP/BLOOD GLUCOSE: CPT

## 2025-06-07 PROCEDURE — 99233 SBSQ HOSP IP/OBS HIGH 50: CPT | Performed by: INTERNAL MEDICINE

## 2025-06-07 PROCEDURE — 71045 X-RAY EXAM CHEST 1 VIEW: CPT

## 2025-06-07 RX ORDER — LORAZEPAM 2 MG/ML
2 INJECTION INTRAMUSCULAR ONCE
Status: COMPLETED | OUTPATIENT
Start: 2025-06-07 | End: 2025-06-07

## 2025-06-07 RX ORDER — INSULIN LISPRO 100 [IU]/ML
1-5 INJECTION, SOLUTION INTRAVENOUS; SUBCUTANEOUS
Status: DISCONTINUED | OUTPATIENT
Start: 2025-06-07 | End: 2025-06-07

## 2025-06-07 RX ORDER — SODIUM CHLORIDE FOR INHALATION 0.9 %
3 VIAL, NEBULIZER (ML) INHALATION
Status: DISCONTINUED | OUTPATIENT
Start: 2025-06-07 | End: 2025-06-07

## 2025-06-07 RX ORDER — LORAZEPAM 2 MG/ML
1 INJECTION INTRAMUSCULAR ONCE
Status: COMPLETED | OUTPATIENT
Start: 2025-06-07 | End: 2025-06-07

## 2025-06-07 RX ORDER — LEVALBUTEROL INHALATION SOLUTION 0.63 MG/3ML
0.63 SOLUTION RESPIRATORY (INHALATION)
Status: DISCONTINUED | OUTPATIENT
Start: 2025-06-07 | End: 2025-06-07

## 2025-06-07 RX ORDER — SODIUM CHLORIDE FOR INHALATION 3 %
4 VIAL, NEBULIZER (ML) INHALATION
Status: COMPLETED | OUTPATIENT
Start: 2025-06-07 | End: 2025-06-10

## 2025-06-07 RX ORDER — INSULIN LISPRO 100 [IU]/ML
1-6 INJECTION, SOLUTION INTRAVENOUS; SUBCUTANEOUS EVERY 6 HOURS SCHEDULED
Status: DISCONTINUED | OUTPATIENT
Start: 2025-06-07 | End: 2025-06-08

## 2025-06-07 RX ORDER — ALBUTEROL SULFATE 0.83 MG/ML
2.5 SOLUTION RESPIRATORY (INHALATION)
Status: DISCONTINUED | OUTPATIENT
Start: 2025-06-07 | End: 2025-06-11

## 2025-06-07 RX ORDER — ACETYLCYSTEINE 200 MG/ML
600 SOLUTION ORAL; RESPIRATORY (INHALATION) 2 TIMES DAILY
Status: DISCONTINUED | OUTPATIENT
Start: 2025-06-07 | End: 2025-06-07

## 2025-06-07 RX ORDER — LORAZEPAM 2 MG/ML
2 INJECTION INTRAMUSCULAR ONCE
Status: DISCONTINUED | OUTPATIENT
Start: 2025-06-07 | End: 2025-06-07

## 2025-06-07 RX ORDER — LORAZEPAM 1 MG/1
2 TABLET ORAL ONCE
Status: COMPLETED | OUTPATIENT
Start: 2025-06-07 | End: 2025-06-07

## 2025-06-07 RX ORDER — SODIUM CHLORIDE FOR INHALATION 3 %
4 VIAL, NEBULIZER (ML) INHALATION
Status: DISCONTINUED | OUTPATIENT
Start: 2025-06-07 | End: 2025-06-07

## 2025-06-07 RX ORDER — LORAZEPAM 2 MG/ML
4 INJECTION INTRAMUSCULAR ONCE
Status: COMPLETED | OUTPATIENT
Start: 2025-06-07 | End: 2025-06-07

## 2025-06-07 RX ORDER — CHLORDIAZEPOXIDE HYDROCHLORIDE 25 MG/1
25 CAPSULE, GELATIN COATED ORAL EVERY 6 HOURS
Status: DISCONTINUED | OUTPATIENT
Start: 2025-06-07 | End: 2025-06-07

## 2025-06-07 RX ORDER — GUAIFENESIN 600 MG/1
600 TABLET, EXTENDED RELEASE ORAL EVERY 12 HOURS SCHEDULED
Status: DISCONTINUED | OUTPATIENT
Start: 2025-06-07 | End: 2025-06-12 | Stop reason: HOSPADM

## 2025-06-07 RX ADMIN — FOLIC ACID TAB 400 MCG 400 MCG: 400 TAB at 08:55

## 2025-06-07 RX ADMIN — PHENOBARBITAL SODIUM 329 MG: 65 INJECTION INTRAMUSCULAR at 12:57

## 2025-06-07 RX ADMIN — LEVALBUTEROL HYDROCHLORIDE 0.63 MG: 0.63 SOLUTION RESPIRATORY (INHALATION) at 07:12

## 2025-06-07 RX ADMIN — CHLORDIAZEPOXIDE HYDROCHLORIDE 25 MG: 25 CAPSULE ORAL at 09:33

## 2025-06-07 RX ADMIN — RANOLAZINE 500 MG: 500 TABLET, FILM COATED, EXTENDED RELEASE ORAL at 08:55

## 2025-06-07 RX ADMIN — PANTOPRAZOLE SODIUM 40 MG: 40 TABLET, DELAYED RELEASE ORAL at 08:55

## 2025-06-07 RX ADMIN — LEVALBUTEROL HYDROCHLORIDE 0.63 MG: 0.63 SOLUTION RESPIRATORY (INHALATION) at 11:16

## 2025-06-07 RX ADMIN — ATORVASTATIN CALCIUM 40 MG: 40 TABLET, FILM COATED ORAL at 08:55

## 2025-06-07 RX ADMIN — LORAZEPAM 2 MG: 2 INJECTION INTRAMUSCULAR; INTRAVENOUS at 04:39

## 2025-06-07 RX ADMIN — ISODIUM CHLORIDE 3 ML: 0.03 SOLUTION RESPIRATORY (INHALATION) at 11:16

## 2025-06-07 RX ADMIN — LORAZEPAM 4 MG: 2 INJECTION INTRAMUSCULAR; INTRAVENOUS at 10:57

## 2025-06-07 RX ADMIN — FERROUS SULFATE TAB 325 MG (65 MG ELEMENTAL FE) 325 MG: 325 (65 FE) TAB at 08:54

## 2025-06-07 RX ADMIN — DILTIAZEM HYDROCHLORIDE 120 MG: 60 CAPSULE, EXTENDED RELEASE ORAL at 08:55

## 2025-06-07 RX ADMIN — INSULIN LISPRO 1 UNITS: 100 INJECTION, SOLUTION INTRAVENOUS; SUBCUTANEOUS at 00:50

## 2025-06-07 RX ADMIN — ALBUTEROL SULFATE 2.5 MG: 2.5 SOLUTION RESPIRATORY (INHALATION) at 15:12

## 2025-06-07 RX ADMIN — NICOTINE 1 PATCH: 21 PATCH, EXTENDED RELEASE TRANSDERMAL at 00:15

## 2025-06-07 RX ADMIN — APIXABAN 5 MG: 5 TABLET, FILM COATED ORAL at 08:54

## 2025-06-07 RX ADMIN — THIAMINE HCL TAB 100 MG 100 MG: 100 TAB at 08:55

## 2025-06-07 RX ADMIN — SODIUM CHLORIDE, SODIUM LACTATE, POTASSIUM CHLORIDE, AND CALCIUM CHLORIDE 500 ML: .6; .31; .03; .02 INJECTION, SOLUTION INTRAVENOUS at 16:25

## 2025-06-07 RX ADMIN — VANCOMYCIN HYDROCHLORIDE 125 MG: 125 CAPSULE ORAL at 06:32

## 2025-06-07 RX ADMIN — SODIUM CHLORIDE, SODIUM LACTATE, POTASSIUM CHLORIDE, AND CALCIUM CHLORIDE 500 ML: .6; .31; .03; .02 INJECTION, SOLUTION INTRAVENOUS at 00:49

## 2025-06-07 RX ADMIN — ACETYLCYSTEINE 600 MG: 200 SOLUTION ORAL; RESPIRATORY (INHALATION) at 07:12

## 2025-06-07 RX ADMIN — LORAZEPAM 2 MG: 1 TABLET ORAL at 00:33

## 2025-06-07 RX ADMIN — LORAZEPAM 1 MG: 2 INJECTION INTRAMUSCULAR; INTRAVENOUS at 02:34

## 2025-06-07 RX ADMIN — METOPROLOL SUCCINATE 100 MG: 100 TABLET, EXTENDED RELEASE ORAL at 08:55

## 2025-06-07 RX ADMIN — VANCOMYCIN HYDROCHLORIDE 125 MG: 125 CAPSULE ORAL at 12:22

## 2025-06-07 RX ADMIN — GUAIFENESIN 600 MG: 600 TABLET, EXTENDED RELEASE ORAL at 12:22

## 2025-06-07 RX ADMIN — Medication 1 TABLET: at 08:55

## 2025-06-07 RX ADMIN — GABAPENTIN 300 MG: 300 CAPSULE ORAL at 08:55

## 2025-06-07 RX ADMIN — FLUTICASONE FUROATE AND VILANTEROL TRIFENATATE 1 PUFF: 200; 25 POWDER RESPIRATORY (INHALATION) at 08:55

## 2025-06-07 RX ADMIN — SODIUM CHLORIDE SOLN NEBU 3% 4 ML: 3 NEBU SOLN at 15:12

## 2025-06-07 RX ADMIN — ASPIRIN 81 MG: 81 TABLET, CHEWABLE ORAL at 08:54

## 2025-06-07 RX ADMIN — THIAMINE HYDROCHLORIDE 500 MG: 100 INJECTION, SOLUTION INTRAMUSCULAR; INTRAVENOUS at 18:17

## 2025-06-07 RX ADMIN — Medication 250 MG: at 08:55

## 2025-06-07 RX ADMIN — THIAMINE HYDROCHLORIDE 500 MG: 100 INJECTION, SOLUTION INTRAMUSCULAR; INTRAVENOUS at 11:24

## 2025-06-07 NOTE — ASSESSMENT & PLAN NOTE
Presented 6/5 with RVR, now currently rate controlled  Continue Cardizem and metoprolol  Holding Eliquis as patient is very high risk for complications as he presents to the ED several times a week with falls  Continue aspirin

## 2025-06-07 NOTE — QUICK NOTE
Asked to evaluate patient by [facility] resident, Dr. Finn, secondary to alcohol withdrawal.     65 year old male with PMH of long standing alcohol abuse (drinks 1 pint of vodka daily), Afib, diastolic HF, DM2, Cdiff, prostate CA, COPD, and medical noncompliance who presented yesterday after fall while intoxicated. Ethanol level 346 on admission. Found to be in AF RVR in the 160s and given IV Cardizem bolus and started on gtt.       CIWA score 10 and then 14. Patient given Ativan 1mg PO at 2300 and then again at 0030. At time of my evaluation patient resting comfortably in bed, occasionally reaches up and grabs for things that are not there, disoriented to time, endorses a mild headache, no tremors. HR 60s, SBP previously soft in the 80s but responded to IVF bolus. After my assessment patient went to sleep. If CIWA score increases again recommend giving 2mg IV Ativan and if no improvement in symptoms please notify critical care again.

## 2025-06-07 NOTE — ASSESSMENT & PLAN NOTE
Wt Readings from Last 3 Encounters:   06/06/25 76.3 kg (168 lb 3.4 oz)   05/28/25 76 kg (167 lb 8.8 oz)   05/13/25 79.1 kg (174 lb 4.8 oz)     Echo 2/2025 revealed EF 50%, moderate mitral regurgitation, moderate tricuspid regurgitation  Continue metoprolol

## 2025-06-07 NOTE — PROGRESS NOTES
Progress Note - Critical Care/ICU   Name: Gregg Partida 65 y.o. male I MRN: [id number]  Unit/Bed#: ICU 03 I Date of Admission: 6/5/2025   Date of Service: 6/7/2025 I Hospital Day: 2      Assessment & Plan  Alcohol withdrawal (HCC)  Baseline severe EtOH abuse  6/5 presented with ethanol of 346  Requiring frequent as needed doses of Ativan  6/7 transfer to [facility] for phenobarb  Was scheduled for 10 mg/kg of phenobarbital divided in 3 doses for a total of 822 mg  Became lethargic after first loading dose of 329mg. Subsequent doses were held overnight.     Plan:  Additional phenobarb doses pending clinical trajectory.   High-dose thiamine  Acute respiratory failure with hypoxia (HCC)  6/6 escalating oxygen requirements to 12 L mid flow nasal cannula, weaned to 4L NC 6/7 6/7 chest x-ray with complete opacification of left lung fields    Plan:  Continue aggressive pulmonary hygiene  Chest physiotherapy, flutter valve, IS  Sodium chloride nebs  Mucinex  Wean supplemental oxygen to maintain SPO2 greater than 92%  Consideration for bronchoscopy if patient's clinical status deteriorates  Acute metabolic encephalopathy  Likely secondary to EtOH withdrawal  Continue neurochecks per unit routine  High-dose thiamine  Atrial fibrillation with RVR (HCC)  Presented 6/5 with RVR, now currently rate controlled  Continue Cardizem and metoprolol  Holding Eliquis as patient is very high risk for complications as he presents to the ED several times a week with falls  Continue aspirin  Chronic heart failure with preserved ejection fraction (HCC)  Wt Readings from Last 3 Encounters:   06/06/25 76.3 kg (168 lb 3.4 oz)   05/28/25 76 kg (167 lb 8.8 oz)   05/13/25 79.1 kg (174 lb 4.8 oz)     Echo 2/2025 revealed EF 50%, moderate mitral regurgitation, moderate tricuspid regurgitation  Continue metoprolol  Thrombocytopenia (HCC)  Platelets down to 70  Holding Eliquis as outlined   Continue aspirin  Monitor CBC  CAD (coronary artery  disease)  Continue aspirin, statin  Transaminitis  Improving, trend  Elevated troponin  Troponin pending this AM to ensure s/p peak   Likely demand  Fall  Fall precautions  PT/OT when appropriate  Type 2 diabetes mellitus, without long-term current use of insulin ([facility])  Accu-Cheks every 6h with sliding scale insulin  COPD (chronic obstructive pulmonary disease) ([facility])  Not in exacerbation  Continue nebs as outlined above  Recurrent Clostridioides difficile diarrhea  Continue p.o. vancomycin    Disposition: Med Surg with Telemetry    ICU Core Measures     A: Assess, Prevent, and Manage Pain Has pain been assessed? Yes  Need for changes to pain regimen? No   B: Both SAT/SAT  N/A   C: Choice of Sedation RASS Goal: N/A patient not on sedation  Need for changes to sedation or analgesia regimen? N/A   D: Delirium CAM-ICU: Unable to perform secondary to Acute cognitive dysfunction   E: Early Mobility  Plan for early mobility? Yes   F: Family Engagement Plan for family engagement today? Yes       Antibiotic Review: Patient on appropriate coverage based on culture data.  and PO vancomycin for recurrent C. Difficile infection       Prophylaxis:  VTE VTE covered by:    None       Stress Ulcer  covered bypantoprazole (PROTONIX) 40 mg tablet [[id number]] (Long-Term Med), pantoprazole (PROTONIX) EC tablet 40 mg [[id number]]         24 Hour Events : Brought to ICU yesterday for management of alcohol withdrawal symptoms. Received one loading dose of phenobarbital and was lethargic overnight. Awoke early this AM with improvement in CIWA score. Mild tremors, no headache/N/V. Mild agitation. Additional doses of phenobarb held overnight. BP has remained stable. Has only voided 100ml since last evening and has been NPO most of night. 500ml isolyte ordered. Patient also started on diet this AM and is tolerating PO fluids. No other changes.     Subjective   Review of Systems: See HPI for Review of Systems    Objective :                    Vitals I/O      Most Recent Min/Max in 24hrs   Temp 98.2 °F (36.8 °C) Temp  Min: 97.5 °F (36.4 °C)  Max: 98.2 °F (36.8 °C)   Pulse 62 Pulse  Min: 61  Max: 97   Resp 15 Resp  Min: 15  Max: 30   BP 96/59 BP  Min: 87/61  Max: 137/73   O2 Sat 100 % SpO2  Min: 92 %  Max: 100 %      Intake/Output Summary (Last 24 hours) at 2025 1857  Last data filed at 2025 0800  Gross per 24 hour   Intake 740 ml   Output 500 ml   Net 240 ml       Diet NPO; Sips with meds    Invasive Monitoring   N/A        Physical Exam   Physical Exam  Eyes:      General: Lids are normal.   Skin:     General: Skin is warm and dry.      Comments: Multiple scabbed areas on knees. Scattered bruising of lower extremities.   HENT:      Head: Normocephalic.      Mouth/Throat:      Mouth: Mucous membranes are moist.      Dentition: Abnormal dentition (poor dentition, multiple carries, missing teeth).   Cardiovascular:      Rate and Rhythm: Normal rate. Rhythm irregular.      Pulses:           Radial pulses are 2+ on the right side and 2+ on the left side.        Popliteal pulses are 1+ on the right side and 1+ on the left side.   Musculoskeletal:      Right lower leg: Trace Edema present.      Left lower leg: Trace Edema present.   Abdominal: General: Bowel sounds are normal.      Palpations: Abdomen is soft.   Constitutional:       General: He is not in acute distress.     Appearance: He is well-developed and well-nourished. He is ill-appearing and toxic-appearing.   Pulmonary:      Effort: Pulmonary effort is normal. No accessory muscle usage, respiratory distress or accessory muscle usage.      Breath sounds: Decreased breath sounds present.   Genitourinary/Anorectal:  external catheter present.        Diagnostic Studies        Lab Results: I have reviewed the following results: see below  EKG: Controlled Atrial fibrillation on monitor, rate 60s  Imagin/7 CXR: Complete opacification of left lung field      Medications:  Scheduled PRN    albuterol, 2.5 mg, Q8H   And  sodium chloride, 4 mL, Q8H  aspirin, 81 mg, Daily  atorvastatin, 40 mg, Daily  diltiazem, 120 mg, BID  ferrous sulfate, 325 mg, Daily With Breakfast  fluticasone-vilanterol, 1 puff, Daily  guaiFENesin, 600 mg, Q12H SEDA  insulin lispro, 1-6 Units, Q6H SEDA  metoprolol succinate, 100 mg, BID  multivitamin-minerals, 1 tablet, Daily  nicotine, 1 patch, Q24H  pantoprazole, 40 mg, Daily  PHENobarbital, 249 mg, Once  ranolazine, 500 mg, BID  saccharomyces boulardii, 250 mg, BID  tamsulosin, 0.4 mg, Daily With Dinner  thiamine, 500 mg, Q8H  vancomycin, 125 mg, Q6H SEDA          Continuous          Labs:   CBC    Recent Labs     06/06/25 0605 06/07/25  0642   WBC 6.04 4.72   HGB 8.8* 8.6*   HCT 27.7* 27.6*   * 70*     BMP    Recent Labs     06/06/25 0605 06/07/25  0609   SODIUM 143 134*   K 3.8 4.3    100   CO2 27 21   AGAP 12 13   BUN 11 13   CREATININE 0.93 1.25   CALCIUM 7.5* 8.1*       Coags    No recent results     Additional Electrolytes  Recent Labs     06/05/25 2147 06/06/25  0605   MG 1.2* 2.0          Blood Gas    No recent results  No recent results LFTs  Recent Labs     06/06/25 0605 06/07/25  0609   ALT 55* 43   AST 96* 61*   ALKPHOS 152* 148*   ALB 3.3* 3.3*   TBILI 0.77 1.67*       Infectious  No recent results  Glucose  Recent Labs     06/05/25 2147 06/06/25 0605 06/07/25  0609   GLUC 64* 97 111

## 2025-06-07 NOTE — ASSESSMENT & PLAN NOTE
Hx recurrent C diff infection  Most recently diagnosed 5/8 hospitalization. Started on PO vancomycin 125mg q6h 5/8  Did not take medications last few days    Continue with PO vancomycin  Contact precautions  Probiotics

## 2025-06-07 NOTE — CONSULTS
Consultation - Critical Care/ICU   Name: Gregg Partida 65 y.o. male I MRN: [id number]  Unit/Bed#: ICU 03 I Date of Admission: 6/5/2025   Date of Service: 6/7/2025 I Hospital Day: 2   Inpatient consult to Medical Critical Care  Consult performed by: CHELSEA Pabon  Consult ordered by: Bipin Freed MD        Physician Requesting Evaluation: Galindo Jo MD   Reason for Evaluation / Principal Problem: Alcohol withdrawal        Assessment & Plan  Alcohol withdrawal (HCC)  Baseline severe EtOH abuse  6/5 presented with ethanol of 346  Requiring frequent as needed doses of Ativan  6/7 transfer to [facility] for phenobarb    Plan:  Administer 10 mg/kg of phenobarbital divided in 3 doses for a total of 822 mg  High-dose thiamine  Acute respiratory failure with hypoxia (HCC)  6/6 escalating oxygen requirements to 12 L mid flow nasal cannula  Currently requiring 5 L nasal cannula  6/7 chest x-ray with complete opacification of left lung fields    Plan:  Continue aggressive pulmonary hygiene  Chest physiotherapy, flutter valve, IS  Sodium chloride nebs  Mucinex  Wean supplemental oxygen to maintain SPO2 greater than 92%  Consideration for bronchoscopy if patient's clinical status deteriorates  Acute metabolic encephalopathy  Likely secondary to EtOH withdrawal  Continue neurochecks per unit routine  High-dose thiamine  Atrial fibrillation with RVR (HCC)  Presented 6/5 with RVR, now currently rate controlled  Continue Cardizem and metoprolol  Stop Eliquis as patient is very high risk for complications as he presents to the ED several times a week with falls  Continue aspirin  Chronic heart failure with preserved ejection fraction (HCC)  Wt Readings from Last 3 Encounters:   06/06/25 76.3 kg (168 lb 3.4 oz)   05/28/25 76 kg (167 lb 8.8 oz)   05/13/25 79.1 kg (174 lb 4.8 oz)   Echo 2/2025 revealed EF 50%, moderate mitral regurgitation, moderate tricuspid regurgitation  Continue metoprolol  Thrombocytopenia  ([facility])  Platelets down to 70  Stop Eliquis as outlined above  Continue aspirin  Monitor CBC  CAD (coronary artery disease)  Continue aspirin statin  Transaminitis  Improving  Elevated troponin  Obtain troponin to ensure its peaked  Likely demand  Fall  Fall precautions  PT/OT when appropriate  Type 2 diabetes mellitus, without long-term current use of insulin ([facility])  Accu-Cheks every 6 with sliding scale insulin  COPD (chronic obstructive pulmonary disease) ([facility])  Not in exacerbation  Continue nebs as outlined above  Recurrent Clostridioides difficile diarrhea  Continue p.o. vancomycin    Disposition: Stepdown Level 1    History of Present Illness   Gregg Partida is a 65 y.o. who presented 6/5 after falling to his knees while intoxicated.  Patient has a significant past medical history of severe EtOH abuse, EtOH withdrawal, heart failure with preserved ejection fraction, atrial fibrillation, CAD, COPD, and diabetes type 2.     Patient was admitted to family medicine service after receiving Cardizem bolus and being placed on a Cardizem infusion.  Patient was transitioned back to to his outpatient regimen of p.o. Cardizem and metoprolol when rate control was achieved.  Unfortunately patient started to exhibit worsening signs of alcohol withdrawal requiring as needed doses of Ativan.  Patient has also had waxing and waning respiratory status with max oxygen requirements of 12 L mid flow nasal cannula.  Chest x-ray this morning was obtained with complete opacification of left lung fields.    Patient to be transferred to critical care medicine service for further management.     History obtained from chart review.  Review of Systems: See HPI for Review of Systems    Historical Information   Past Medical History:  No date: Acute on chronic kidney failure  (HCC)  No date: Alcohol abuse  10/09/2018: Alcohol withdrawal ([facility])  No date: Atrial fibrillation ([facility])  No date: Cancer ([facility])      Comment:  prostate ca,had  radiation  No date: Cardiac disease      Comment:  stents,then triple bypass  No date: COPD (chronic obstructive pulmonary disease) ([facility])  No date: Coronary artery disease  No date: ETOH abuse  No date: Heart failure ([facility])  No date: History of heart surgery      Comment:  says triple bypass [facility]  No date: Hx of heart artery stent      Comment:  2014  No date: Hyperlipidemia  No date: Hypertension  06/05/2025: Hypoglycemia  10/17/2019: Hyponatremia  12/22/2019: Hypovolemic shock ([facility])  04/21/2021: Increased anion gap  10/13/2022: Lumbar spondylitis ([facility])  04/21/2021: Metabolic acidosis, increased anion gap  10/10/2022: Nasal bone fracture  No date: Prostate CA ([facility])  No date: S/P CABG x 3      Comment:  2004  No date: Sleep apnea Past Surgical History:  No date: CARDIAC CATHETERIZATION      Comment:  2 stents  No date: CORONARY ARTERY BYPASS GRAFT  2004: CORONARY ARTERY BYPASS GRAFT  12/16/2020: NE ARTHRD ANT INTERBODY MIN DSC CRV BELOW C2; N/A      Comment:  Procedure: Anterior cervical discectomy with fusion                C4-C7; Posterior cervical decompression and fusion C2-T2;               Surgeon: David Rowell MD;  Location: [facility];                 Service: Neurosurgery  No date: TONSILLECTOMY   Current Outpatient Medications   Medication Instructions    albuterol (PROVENTIL HFA,VENTOLIN HFA) 90 mcg/act inhaler 2 puffs, Inhalation, Every 6 hours PRN    aluminum-magnesium hydroxide 200-200 MG/5ML suspension 5 mL, Oral, Every 6 hours PRN    apixaban (ELIQUIS) 5 mg, Oral, 2 times daily    aspirin 81 mg, Oral, Daily    atorvastatin (LIPITOR) 40 mg, Oral, Daily    Blood Glucose Monitoring Suppl (ONE TOUCH ULTRA MINI) w/Device KIT Use as directed    Blood Pressure Monitoring (Adult Blood Pressure Cuff Lg) KIT Does not apply, Daily    diltiazem (CARDIZEM SR) 120 mg, Oral, 2 times daily    ferrous sulfate 325 mg, Oral, Daily with breakfast    fluticasone-vilanterol 200-25 mcg/actuation inhaler 1 puff,  Inhalation, Daily, Rinse mouth after use.    folic acid (FOLVITE) 400 mcg, Oral, Daily    gabapentin (NEURONTIN) 300 mg, Oral, 3 times daily    lidocaine (LIDODERM) 5 % 1 patch, Topical, Daily, Remove & Discard patch within 12 hours or as directed by MD    magnesium oxide-pyridoxine (BEELITH) 362-20 MG TABS 1 tablet, Oral, Daily    metFORMIN (GLUCOPHAGE) 500 mg, Oral, Daily with breakfast    metoprolol succinate (TOPROL-XL) 100 mg, Oral, Daily    naltrexone (REVIA) 50 mg, Oral, Daily    nicotine (NICODERM CQ) 21 mg/24 hr TD 24 hr patch 1 patch, Every 24 hours    ONETOUCH DELICA LANCETS FINE MISC 3 times daily    pantoprazole (PROTONIX) 40 mg, Oral, Daily    ranolazine (RANEXA) 500 mg, Oral, 2 times daily    saccharomyces boulardii (FLORASTOR) 250 mg, Oral, 2 times daily    senna-docusate sodium (SENOKOT-S) 8.6-50 mg per tablet 1 tablet, Oral, Daily    tamsulosin (FLOMAX) 0.4 mg, Oral, Daily with dinner    Thiamine Mononitrate (VITAMIN B1) 100 mg, Oral, Daily    vancomycin (VANCOCIN) 125 mg, Oral, Every 6 hours scheduled    Allergies[1]   Social History[2] Family History[3]       Objective :                   Vitals I/O      Most Recent Min/Max in 24hrs   Temp 97.9 °F (36.6 °C) Temp  Min: 97.5 °F (36.4 °C)  Max: 98.2 °F (36.8 °C)   Pulse 85 Pulse  Min: 61  Max: 100   Resp (!) 23 Resp  Min: 16  Max: 30   /62 BP  Min: 87/61  Max: 139/71   O2 Sat 95 % SpO2  Min: 90 %  Max: 98 %      Intake/Output Summary (Last 24 hours) at 6/7/2025 1155  Last data filed at 6/7/2025 0800  Gross per 24 hour   Intake 740 ml   Output 820 ml   Net -80 ml       Diet NPO; Sips with meds    Invasive Monitoring           Physical Exam   Physical Exam  Vitals reviewed.   Eyes:      Pupils: Pupils are equal, round, and reactive to light.   Skin:     General: Skin is warm and dry.      Comments: Scattered abrasions on upper and lower extremities   HENT:      Mouth/Throat:      Mouth: Mucous membranes are moist.   Cardiovascular:      Rate and  Rhythm: Normal rate. Rhythm irregular.      Pulses: Normal pulses.   Abdominal:      Palpations: Abdomen is soft.   Constitutional:       General: He is awake.      Appearance: He is ill-appearing (chronically).   Pulmonary:      Effort: Tachypnea present.      Breath sounds: Rhonchi present. No wheezing or rales.   Psychiatric:         Attention and Perception: He perceives visual hallucinations.         Mood and Affect: Mood is anxious.         Behavior: Behavior is actively hallucinating.   Neurological:      Motor: Tremor.          Diagnostic Studies        Lab Results: I have reviewed the following results:     Medications:  Scheduled PRN   albuterol, 2.5 mg, Q8H   And  sodium chloride, 4 mL, Q8H  aspirin, 81 mg, Daily  atorvastatin, 40 mg, Daily  diltiazem, 120 mg, BID  ferrous sulfate, 325 mg, Daily With Breakfast  fluticasone-vilanterol, 1 puff, Daily  guaiFENesin, 600 mg, Q12H SEDA  insulin lispro, 1-6 Units, Q6H SEDA  metoprolol succinate, 100 mg, BID  multivitamin-minerals, 1 tablet, Daily  nicotine, 1 patch, Q24H  pantoprazole, 40 mg, Daily  PHENobarbital, 329 mg, Once  ranolazine, 500 mg, BID  saccharomyces boulardii, 250 mg, BID  tamsulosin, 0.4 mg, Daily With Dinner  thiamine, 500 mg, Q8H  vancomycin, 125 mg, Q6H SEDA          Continuous          Labs:   CBC    Recent Labs     06/06/25  0605 06/07/25  0642   WBC 6.04 4.72   HGB 8.8* 8.6*   HCT 27.7* 27.6*   * 70*     BMP    Recent Labs     06/06/25  0605 06/07/25  0609   SODIUM 143 134*   K 3.8 4.3    100   CO2 27 21   AGAP 12 13   BUN 11 13   CREATININE 0.93 1.25   CALCIUM 7.5* 8.1*       Coags    No recent results     Additional Electrolytes  Recent Labs     06/05/25  2147 06/06/25  0605   MG 1.2* 2.0          Blood Gas    No recent results  No recent results LFTs  Recent Labs     06/06/25  0605 06/07/25  0609   ALT 55* 43   AST 96* 61*   ALKPHOS 152* 148*   ALB 3.3* 3.3*   TBILI 0.77 1.67*       Infectious  No recent results   "Glucose  Recent Labs     06/05/25  2147 06/06/25  0605 06/07/25  0609   GLUC 64* 97 111        Administrative Statements          [1] No Known Allergies  [2]   Social History  Tobacco Use    Smoking status: Every Day     Current packs/day: 1.50     Average packs/day: 1.5 packs/day for 40.0 years (60.0 ttl pk-yrs)     Types: Cigarettes    Smokeless tobacco: Never   Vaping Use    Vaping status: Never Used   Substance Use Topics    Alcohol use: Yes     Alcohol/week: 4.0 standard drinks of alcohol     Types: 4 Standard drinks or equivalent per week     Comment: \"quart a day\"    Drug use: No   [3]   Family History  Problem Relation Name Age of Onset    Diabetes Mother      Uterine cancer Mother      COPD Father      Hypertension Father       "

## 2025-06-07 NOTE — ASSESSMENT & PLAN NOTE
Presented to ED after alcohol intoxication and fall  EKG showing A-fib with RVR  Suspect secondary to alcohol use, or not using medications  Home meds: Eliquis 5mg BID, metoprolol 100mg BID, Cardizem 120mg BID    -CHADVASC score 3  -EKG A-fib w/RVR, rate 166    ED course: IV cardizem 10mgx1, D5NS, cardizem drip, IV mag 2gx1    Currently off cardizem drip  Continue with metoprolol 100 mg p.o. twice daily, Cardizem 120 mg p.o. twice daily and Eliquis 5 mg twice daily   Telemetry - currently rate controlled

## 2025-06-07 NOTE — OCCUPATIONAL THERAPY NOTE
OCCUPATIONAL THERAPY       06/07/25 1030   OT Last Visit   OT Visit Date 06/07/25   Note Type   Note type Cancelled Session   Cancel Reasons Medical status   Additional Comments agitated and confused; not appropriate for OT eval at this time   Licensure   [place] License Number  Ya Estevez MS, OTR/L, #[id number]

## 2025-06-07 NOTE — ASSESSMENT & PLAN NOTE
Last drink morning of ED visit  Does not use home medication Naltrexone 50mg daily    Ground level fall on afternoon of initial presentation    POA CIWA 9    -Initial EtOH: 346  -CT head: No acute intracranial abnormality  -CT spine: No cervical spine fracture or traumatic malalignment    6/7 : Worsening CIWA score, last CIWA 32 at 10 AM    Transfer to ICU level of care for phenobarbital  Continue CIWA protocol  Thiamine, folic acid, multivitamin  Seizure, fall, aspiration precautions  Monitor telemetry  Hold home Naltrexone  PT/OT

## 2025-06-07 NOTE — ASSESSMENT & PLAN NOTE
CAD s/p CABG in 2004.   Home medications: ASA 81 mg, Ranexa 500 mg BID,   No chest pain endorsed at time of initial assessment.     Aspirin daily  Continue Ranexa

## 2025-06-07 NOTE — HOSPITAL COURSE
Gregg Partida is a 65 y.o. male with a PMH of A-fib, alcohol use disorder, CAD s/p CABG, CHF who was admitted on 6/6/2025 for A-fib w/ RVR and alcohol withdrawal. He has frequent admissions for similar presentations. He was found at home after a fall and alcohol intoxicated. EMS found him to be in A-fib w/ RVR and hypoglycemic.   In the ED, he was started on a Cardizem drip. As his heart rate and rhythm normalized, he was transitioned to his home oral regimen.     He was initially hypoglycemic likely secondary to poor PO intake. His sugars were closely monitored while on D5NS infusion. Once he began to tolerate PO foods and sugars stabilized closer to normal limits, his fluids were discontinued.     Patient also had worsening respiratory status. He had increased work of breathing, requiring up to 12L of oxygen. CXR showed whiteout of left lung secondary to possible mucus plugging. Patient was treated with chest physical therapy, albuterol, hypertonic nebulizer, mucinex. Pulmonology was consulted who recommended aggressive pulmonary hygiene and continued nebulizer therapy. His CXR showed significant interval improvement.     Per CIWA protocol, patient was noted to be requiring multiple doses of PO and IV Ativan. These doses were not fully reliving his withdrawal symptoms. Patient was then started on small doses of Librium. Due to excessive use of ativan, patient was transferred to ICU level of care for IV phenobarbital x 1. He was downgraded back to the primary service once his clinical withdrawal status stabilized. Gait was assessed and patient is stable for discharge to home.

## 2025-06-07 NOTE — ASSESSMENT & PLAN NOTE
6/6 escalating oxygen requirements to 12 L mid flow nasal cannula  Currently requiring 5 L nasal cannula  6/7 chest x-ray with complete opacification of left lung fields    Plan:  Continue aggressive pulmonary hygiene  Chest physiotherapy, flutter valve, IS  Sodium chloride nebs  Mucinex  Wean supplemental oxygen to maintain SPO2 greater than 92%  Consideration for bronchoscopy if patient's clinical status deteriorates

## 2025-06-07 NOTE — ASSESSMENT & PLAN NOTE
Lab Results   Component Value Date    HGBA1C 5.2 05/05/2025       Recent Labs     06/06/25  0220 06/06/25  1037 06/07/25  0039 06/07/25  0714   POCGLU 70 164* 151* 139       Blood Sugar Average: Last 72 hrs:  (P) 117    Chronic, unstable.  Home meds: Gabapentin 300mg TID, Metformin 500mg daily  Hypoglycemic initially likely secondary to poor PO intake    Hold metformin  Continue gabapentin  ISS  Hypoglycemia protocol  Carb controlled diet

## 2025-06-07 NOTE — ASSESSMENT & PLAN NOTE
Baseline severe EtOH abuse  6/5 presented with ethanol of 346  Requiring frequent as needed doses of Ativan  6/7 transfer to [facility] for phenobarb    Plan:  Administer 10 mg/kg of phenobarbital divided in 3 doses for a total of 822 mg  High-dose thiamine

## 2025-06-07 NOTE — DISCHARGE INSTR - AVS FIRST PAGE
Please follow up with pulmonology  Please follow up with PCP   Please continue to refrain from alcohol use   Please continue to take your vancomycin every 6 hours for 7 days   Please START torsemide 10 mg every OTHER day  Please get BMP (blood work) in 1 week  Complete chest X-ray in one week  Complete magnesium lab in one week

## 2025-06-07 NOTE — ASSESSMENT & PLAN NOTE
Presented 6/5 with RVR, now currently rate controlled  Continue Cardizem and metoprolol  Stop Eliquis as patient is very high risk for complications as he presents to the ED several times a week with falls  Continue aspirin

## 2025-06-07 NOTE — NURSING NOTE
Residents Rich Finn at bedside to evaluate patient. Per MD, pt with a CIWA score of 17. MD would like pt to receive 2mg IV Ativan. Explained per CIWA protocol the patient should receive 4mg IV Ativan. Per MD, only administer 2mg now. Residents asked if they would like nursing to follow protocol as we haven't been. Per MD, continue to assess CIWA score and contact them for any medication orders. Residents asked to put in a nursing communication explaining same.

## 2025-06-07 NOTE — ASSESSMENT & PLAN NOTE
Ground level fall on afternoon of ED visit. Denies preceding headache, dizziness, lightheadedness  Multiple falls due to drinking alcohol in the past    CT head and spine unremarkable    Fall precautions  PT/OT

## 2025-06-07 NOTE — ASSESSMENT & PLAN NOTE
Patient with recent fall, alcohol withdrawal and aspiration risk presented yesterday with acute hypoxia requiring 12 L of oxygen, now on 5 L  Patient is otherwise afebrile  Likely contributing factors recent alcohol abuse, poor p.o. intake, high aspiration risk    Follow-up chest x-ray final read, preliminary read showing complete left lung whiteout - likely atelectasis, possible mucous plug or aspiration pneumonitis  Continue with sodium chloride nebs every 8 hours  Continue levalbuterol nebs every 8 hours  Use Acapella device and incentive spirometry aggressively  Chest physiotherapy and repositioning  Follow-up chest x-ray in 12 to 24 hours to monitor resolution  Consider CT chest if no improvement or diagnosis unclear  Pulmonology consulted  Consider bronchoscopy for direct suctioning of mucous plug if no improvement

## 2025-06-07 NOTE — UTILIZATION REVIEW
Initial Clinical Review - Continued    SEE INITIAL REVIEW AT BOTTOM    Date: 6/7/25                      Day 3    Current Patient Class: Inpatient  Current Level of Care: med surg    HPI:65 y.o. male initially admitted on 6/5/2025 for  Atrial fibrillation with RVR (HCC).      Assessment/Plan:   Date: 6/7/25  Day 3: Has surpassed a 2nd midnight with active treatments and services.  Ativan required for climbing CIWA scores fro 10 to 14. Pt with visible and audible hallucinations and attempting to climb out of bed, disoriented to time, endorses a mild headache. SBP 80's, responded to IVF bolus. Continued to be restless throughout the night, continue CIWA protocol. Afib with RVR POA. remains on telemetry monitoring.  Acute respiratory failure with hypoxia-patient had worsening respiratory status since yesterday evening and patient was started on chest physical therapy.  Patient continues to have increased work of breathing and was needing up to 12 L of oxygen overnight.  Chest x-ray was done which showed complete whiteout of the left lung possible secondary  mucous plugging.  Continue oxygen supplementation.  Patient started on albuterol with hypertonic nebulizer treatment.  Continue Mucinex. pulmonary was consulted.  Based on further course patient might need bronchoscopy  Alcohol abuse with withdrawal-patient was needing multiple doses of p.o. and IV Ativan overnight due to elevated CIWA scores.  Patient started on small dose Librium.  Patient to be transferred to ICU level of care as patient might need IV phenobarbital.  Continue thiamine, folic acid and multivitamin.    Continue to monitor respiratory status, mental status/behaviors, maintain CIWA protocol. Monitor VS, O2 needs, follow labs    Medications:   Scheduled Medications:  Medications 05/29 05/30 05/31 06/01 06/02 06/03 06/04 06/05 06/06 06/07   acetaminophen (TYLENOL) tablet 975 mg  Dose: 975 mg  Freq: Once Route: PO  Start: 06/02/25 0300 End: 06/02/25  0354        0354             acetylcysteine (MUCOMYST) 200 mg/mL inhalation solution 600 mg  Dose: 3 mL  Freq: Every 8 hours (RESP) Route: NEBULIZATION  Start: 06/06/25 1745 End: 06/07/25 1141   Admin Instructions:               1745     (9620) [C]      0716     1141-D/C'd      acetylcysteine (MUCOMYST) 200 mg/mL oral solution 600 mg  Dose: 600 mg  Freq: 2 times daily Route: PO  Start: 06/07/25 1145 End: 06/07/25 1143   Admin Instructions:                1143-D/C'd       albuterol inhalation solution 2.5 mg  Dose: 2.5 mg  Freq: Every 8 hours (RESP) Route: NEBULIZATION  Start: 06/07/25 1600             1600        And   sodium chloride 3 % inhalation solution 4 mL  Dose: 4 mL  Freq: Every 8 hours (RESP) Route: NEBULIZATION  Start: 06/07/25 1600 End: 06/10/25 1559   Admin Instructions:      Order specific questions:                1600        apixaban (ELIQUIS) tablet 5 mg  Dose: 5 mg  Freq: 2 times daily Route: PO  Start: 06/06/25 0900 End: 06/07/25 1133   Admin Instructions:      Order specific questions:               0927     1703      0854     1133-D/C'd      apixaban (ELIQUIS) tablet 5 mg  Dose: 5 mg  Freq: 2 times daily Route: PO  Start: 05/25/25 0900 End: 05/28/25 1622   Admin Instructions:      Order specific questions:                   aspirin chewable tablet 81 mg  Dose: 81 mg  Freq: Daily Route: PO  Start: 06/06/25 0900           2343      (0900)     7012      0854        aspirin chewable tablet 81 mg  Dose: 81 mg  Freq: Daily Route: PO  Start: 05/25/25 0900 End: 05/28/25 1622                atorvastatin (LIPITOR) tablet 40 mg  Dose: 40 mg  Freq: Daily Route: PO  Start: 06/06/25 0900           2343      (0900)     2246      0852        atorvastatin (LIPITOR) tablet 40 mg  Dose: 40 mg  Freq: Daily Route: PO  Start: 05/25/25 0900 End: 05/28/25 1622                bacitracin topical ointment 1 small application  Dose: 1 small application  Freq: Once Route: TP  Start: 06/02/25 0300 End: 06/02/25 0355   Admin  Instructions:      Order specific questions:           0355             calcium carbonate (TUMS) chewable tablet 1,000 mg  Dose: 1,000 mg  Freq: Daily Route: PO  Start: 05/25/25 0900 End: 05/28/25 1622   Admin Instructions:                   chlordiazePOXIDE (LIBRIUM) capsule 25 mg  Dose: 25 mg  Freq: Every 6 hours Route: PO  Start: 06/07/25 0930 End: 06/07/25 1148   Admin Instructions:                0933     1148-D/C'd      chlordiazePOXIDE (LIBRIUM) capsule 50 mg  Dose: 50 mg  Freq: Once Route: PO  Start: 06/03/25 1730 End: 06/03/25 2357   Admin Instructions:            1811     2357-D/C'd          diltiazem (CARDIZEM SR) 12 hr capsule 120 mg  Dose: 120 mg  Freq: 2 times daily Route: PO  Start: 06/06/25 0945   Admin Instructions:      Order specific questions:               1038     1708      0855     1800        diltiazem (CARDIZEM SR) 12 hr capsule 120 mg  Dose: 120 mg  Freq: 2 times daily Route: PO  Start: 06/06/25 0900 End: 06/06/25 0937   Admin Instructions:      Order specific questions:              2347 (0900     0901     0937-D/C'd       diltiazem (CARDIZEM SR) 12 hr capsule 120 mg  Dose: 120 mg  Freq: 2 times daily Route: PO  Start: 05/25/25 0900 End: 05/28/25 1622   Admin Instructions:      Order specific questions:                   diltiazem (CARDIZEM) injection 10 mg  Dose: 10 mg  Freq: Once Route: IV  Start: 06/05/25 2145 End: 06/05/25 2146   Admin Instructions:              2146          diltiazem (CARDIZEM) injection 15 mg  Dose: 15 mg  Freq: Once Route: IV  Start: 06/05/25 2145 End: 06/05/25 2135   Admin Instructions:              2135-D/C'd        ferrous sulfate tablet 325 mg  Dose: 325 mg  Freq: Daily with breakfast Route: PO  Start: 06/06/25 0730   Admin Instructions:               0927      0854        ferrous sulfate tablet 325 mg  Dose: 325 mg  Freq: Daily with breakfast Route: PO  Start: 05/25/25 0730 End: 05/28/25 1622   Admin Instructions:                   fluticasone-vilanterol  200-25 mcg/actuation 1 puff  Dose: 1 puff  Freq: Daily Route: IN  Start: 06/06/25 0900   Admin Instructions:               1039      0855        fluticasone-vilanterol 200-25 mcg/actuation 1 puff  Dose: 1 puff  Freq: Daily Route: IN  Start: 05/25/25 0900 End: 05/28/25 1622   Admin Instructions:                   folic acid (FOLVITE) tablet 1 mg  Dose: 1 mg  Freq: Daily Route: PO  Start: 05/25/25 0900 End: 05/28/25 1622                folic acid (FOLVITE) tablet 400 mcg  Dose: 400 mcg  Freq: Daily Route: PO  Start: 06/06/25 0900 End: 06/07/25 1148            0928      0855     1148-D/C'd      gabapentin (NEURONTIN) capsule 300 mg  Dose: 300 mg  Freq: 3 times daily Route: PO  Start: 06/05/25 2345 End: 06/07/25 1148   Admin Instructions:               0159     0927     1656     2048      0855     1148-D/C'd      gabapentin (NEURONTIN) capsule 300 mg  Dose: 300 mg  Freq: 3 times daily Route: PO  Start: 05/24/25 2345 End: 05/28/25 1622   Admin Instructions:                   guaiFENesin (MUCINEX) 12 hr tablet 600 mg  Dose: 600 mg  Freq: Every 12 hours scheduled Route: PO  Start: 06/07/25 1145   Admin Instructions:                1145     2100         insulin lispro (HumALOG/ADMELOG) 100 units/mL subcutaneous injection 1-5 Units  Dose: 1-5 Units  Freq: 3 times daily before meals Route: SC  Start: 06/07/25 0700 End: 06/07/25 1153   Admin Instructions:                0716) [C]     (1728)     1153-D/C'd      insulin lispro (HumALOG/ADMELOG) 100 units/mL subcutaneous injection 1-5 Units  Dose: 1-5 Units  Freq: Daily at bedtime Route: SC  Start: 06/06/25 2300 End: 06/07/25 1153   Admin Instructions:                0050 [C]     1153-D/C'd       insulin lispro (HumALOG/ADMELOG) 100 units/mL subcutaneous injection 1-5 Units  Dose: 1-5 Units  Freq: 3 times daily before meals Route: SC  Start: 06/07/25 0700 End: 06/07/25 0035   Admin Instructions:                0035-D/C'd      insulin lispro (HumALOG/ADMELOG) 100 units/mL  subcutaneous injection 1-5 Units  Dose: 1-5 Units  Freq: Daily at bedtime Route: SC  Start: 05/24/25 2200 End: 05/28/25 1622   Admin Instructions:                    insulin lispro (HumALOG/ADMELOG) 100 units/mL subcutaneous injection 1-5 Units  Dose: 1-5 Units  Freq: 3 times daily with meals Route: SC  Start: 05/25/25 0730 End: 05/28/25 1622   Admin Instructions:                    insulin lispro (HumALOG/ADMELOG) 100 units/mL subcutaneous injection 1-6 Units  Dose: 1-6 Units  Freq: Every 6 hours scheduled Route: SC  Start: 06/07/25 1200   Admin Instructions:                (3916) 0398        ketorolac (TORADOL) injection 15 mg  Dose: 15 mg  Freq: Once Route: IV  Start: 06/06/25 1715 End: 06/06/25 1730   Admin Instructions:               1730         lactated ringers bolus 500 mL  Dose: 500 mL  Freq: Once Route: IV  Last Dose: Stopped (06/07/25 0149)  Start: 06/07/25 0045 End: 06/07/25 0149 0049 0149         levalbuterol (XOPENEX) inhalation solution 0.63 mg  Dose: 0.63 mg  Freq: Every 8 hours (RESP) Route: NEBULIZATION  Start: 06/07/25 1045 End: 06/07/25 1143             1116     1143-D/C'd      And   sodium chloride 0.9 % inhalation solution 3 mL  Dose: 3 mL  Freq: Every 8 hours (RESP) Route: NEBULIZATION  Start: 06/07/25 1045 End: 06/07/25 1143             1116     1143-D/C'd      levalbuterol (XOPENEX) inhalation solution 0.63 mg  Dose: 0.63 mg  Freq: Every 8 hours Route: NEBULIZATION  Start: 06/06/25 1745 End: 06/07/25 1006            1745     (9037) [C]      0712     1006-D/C'd      lidocaine-epinephrine (XYLOCAINE/EPINEPHRINE) 1 %-1:100,000 injection 5 mL  Dose: 5 mL  Freq: Once Route: INFILTRATION  Start: 06/02/25 0145 End: 06/02/25 0144   Admin Instructions:           0144 [C]             lorazepam (ATIVAN) 50 mg in dextrose 250mL infusion 3 mg  Dose: 3 mg  Freq: Once Route: IV  Start: 06/07/25 1100 End: 06/07/25 1048   Admin Instructions:                1048-D/C'd      LORazepam  (ATIVAN) injection 1 mg  Dose: 1 mg  Freq: Once Route: IV  Indications of Use: AGITATION,ALCOHOL WITHDRAWAL SYNDROME  Start: 06/07/25 0230 End: 06/07/25 0234   Admin Instructions:                0234 [C]        LORazepam (ATIVAN) injection 2 mg  Dose: 2 mg  Freq: Once Route: IV  Start: 06/07/25 0445 End: 06/07/25 0439   Admin Instructions:                0439        LORazepam (ATIVAN) injection 2 mg  Dose: 2 mg  Freq: Once Route: IV  Start: 06/07/25 0400 End: 06/07/25 0431   Admin Instructions:                (0400) [C]     0431-D/C'd      LORazepam (ATIVAN) injection 2 mg  Dose: 2 mg  Freq: Once Route: IV  Start: 06/07/25 0230 End: 06/07/25 0228   Admin Instructions:                0228-D/C'd      LORazepam (ATIVAN) injection 4 mg  Dose: 4 mg  Freq: Once Route: IV  Start: 06/07/25 1100 End: 06/07/25 1057   Admin Instructions:                1057        LORazepam (ATIVAN) tablet 2 mg  Dose: 2 mg  Freq: Once Route: PO  Indications of Use: ALCOHOL WITHDRAWAL SYNDROME  Start: 06/07/25 0030 End: 06/07/25 0033   Admin Instructions:                0033        LORazepam (ATIVAN) tablet 2 mg  Dose: 2 mg  Freq: Once Route: PO  Indications of Use: ALCOHOL WITHDRAWAL SYNDROME  Start: 06/06/25 2300 End: 06/06/25 2257   Admin Instructions:               2257         LORazepam (ATIVAN) tablet 2 mg  Dose: 2 mg  Freq: Once Route: PO  Indications of Use: ALCOHOL WITHDRAWAL SYNDROME  Start: 05/28/25 0100 End: 05/28/25 0101   Admin Instructions:                   magnesium Oxide (MAG-OX) tablet 800 mg  Dose: 800 mg  Freq: 2 times daily Route: PO  Start: 05/25/25 0900 End: 05/28/25 1622                magnesium sulfate 2 g/50 mL IVPB (premix) 2 g  Dose: 2 g  Freq: Once Route: IV  Last Dose: Stopped (06/06/25 0413)  Start: 06/06/25 0215 End: 06/06/25 0413   Admin Instructions:               0213 0413         magnesium sulfate 2 g/50 mL IVPB (premix) 2 g  Dose: 2 g  Freq: Once Route: IV  Last Dose: Stopped (06/05/25 3506)  Start:  06/05/25 2230 End: 06/05/25 2346   Admin Instructions:              2246     2346          magnesium sulfate 2 g/50 mL IVPB (premix) 2 g  Dose: 2 g  Freq: Once Route: IV  Last Dose: Stopped (06/03/25 1551)  Start: 06/03/25 1530 End: 06/03/25 1551   Admin Instructions:            1531     1551            magnesium sulfate 2 g/50 mL IVPB (premix) 2 g  Dose: 2 g  Freq: Once Route: IV  Start: 05/27/25 0500 End: 05/27/25 0608   Admin Instructions:                   magnesium sulfate 4 g/100 mL IVPB (premix) 4 g  Dose: 4 g  Freq: Once Route: IV  Last Dose: 4 g (05/28/25 0901)  Start: 05/28/25 0630 End: 05/28/25 1301   Admin Instructions:                   magnesium sulfate 4 g/100 mL IVPB (premix) 4 g  Dose: 4 g  Freq: Once Route: IV  Last Dose: 4 g (05/27/25 0736)  Start: 05/27/25 0630 End: 05/27/25 1136   Admin Instructions:                   melatonin tablet 3 mg  Dose: 3 mg  Freq: Daily at bedtime Route: PO  Start: 05/26/25 2200 End: 05/28/25 1622                metoprolol (LOPRESSOR) injection 5 mg  Dose: 5 mg  Freq: Once Route: IV  Start: 06/03/25 1730 End: 06/03/25 1808   Admin Instructions:            1808            metoprolol (LOPRESSOR) injection 5 mg  Dose: 5 mg  Freq: Once Route: IV  Start: 06/03/25 1545 End: 06/03/25 1547   Admin Instructions:            1547-D/C'd  (1555)            metoprolol (LOPRESSOR) injection 5 mg  Dose: 5 mg  Freq: Once Route: IV  Start: 06/03/25 1530 End: 06/03/25 1531   Admin Instructions:            1531            metoprolol (LOPRESSOR) injection 5 mg  Dose: 5 mg  Freq: Once Route: IV  Start: 06/03/25 1430 End: 06/03/25 1512   Admin Instructions:            1512            metoprolol (LOPRESSOR) injection 5 mg  Dose: 5 mg  Freq: Once Route: IV  Start: 05/26/25 1400 End: 05/26/25 1354   Admin Instructions:                   metoprolol succinate (TOPROL-XL) 24 hr tablet 100 mg  Dose: 100 mg  Freq: 2 times daily Route: PO  Start: 06/06/25 1800   Admin Instructions:      Order  specific questions:               1704      0855     1800        metoprolol succinate (TOPROL-XL) 24 hr tablet 100 mg  Dose: 100 mg  Freq: Daily Route: PO  Start: 06/06/25 0900 End: 06/06/25 0936   Admin Instructions:      Order specific questions:               0927 0936-D/C'd       metoprolol succinate (TOPROL-XL) 24 hr tablet 100 mg  Dose: 100 mg  Freq: Once Route: PO  Start: 06/03/25 1730 End: 06/03/25 2357   Admin Instructions:      Order specific questions:            1811 2357-D/C'd          metoprolol succinate (TOPROL-XL) 24 hr tablet 150 mg  Dose: 150 mg  Freq: Every 12 hours scheduled Route: PO  Start: 05/25/25 0000 End: 05/28/25 1622   Admin Instructions:      Order specific questions:                   multivitamin stress formula tablet 1 tablet  Dose: 1 tablet  Freq: Daily Route: PO  Start: 05/25/25 0900 End: 05/28/25 1622   Admin Instructions:                   multivitamin-minerals (CENTRUM) tablet 1 tablet  Dose: 1 tablet  Freq: Daily Route: PO  Start: 06/06/25 0900   Admin Instructions:               0927 0855        nicotine (NICODERM CQ) 21 mg/24 hr TD 24 hr patch 1 patch  Dose: 1 patch  Freq: Every 24 hours Route: TD  Start: 06/06/25 0000   Admin Instructions:               0159     2300      0015     2359        nicotine (NICODERM CQ) 21 mg/24 hr TD 24 hr patch 1 patch  Dose: 1 patch  Freq: Daily Route: TD  Start: 05/25/25 0900 End: 05/28/25 1622   Admin Instructions:                   pantoprazole (PROTONIX) EC tablet 40 mg  Dose: 40 mg  Freq: Daily Route: PO  Start: 06/06/25 0900   Admin Instructions:               0927      0855        pantoprazole (PROTONIX) EC tablet 40 mg  Dose: 40 mg  Freq: Daily Route: PO  Start: 05/25/25 0900 End: 05/28/25 1622   Admin Instructions:                   PHENobarbital 329 mg in sodium chloride 0.9 % 100 mL IVPB  Dose: 329 mg  Freq: Once Route: IV  Start: 06/07/25 1200   Admin Instructions:                1200        pyridoxine (VITAMIN B6)  tablet 100 mg  Dose: 100 mg  Freq: Daily Route: PO  Start: 05/25/25 0900 End: 05/28/25 1622                ranolazine (RANEXA) 12 hr tablet 500 mg  Dose: 500 mg  Freq: 2 times daily Route: PO  Start: 06/06/25 0900   Admin Instructions:               0928     1703      0855     1800        ranolazine (RANEXA) 12 hr tablet 500 mg  Dose: 500 mg  Freq: 2 times daily Route: PO  Start: 05/25/25 0900 End: 05/28/25 1622   Admin Instructions:                   saccharomyces boulardii (FLORASTOR) capsule 250 mg  Dose: 250 mg  Freq: 2 times daily Route: PO  Start: 06/06/25 0900            0928     1704      0855     1800        saccharomyces boulardii (FLORASTOR) capsule 250 mg  Dose: 250 mg  Freq: 2 times daily Route: PO  Start: 05/25/25 0900 End: 05/28/25 1622                senna-docusate sodium (SENOKOT S) 8.6-50 mg per tablet 1 tablet  Dose: 1 tablet  Freq: Daily Route: PO  Start: 06/06/25 0900 End: 06/05/25 2350           2350-D/C'd        sodium chloride 0.9 % bolus 1,000 mL  Dose: 1,000 mL  Freq: Once Route: IV  Last Dose: Stopped (06/03/25 1911)  Start: 06/03/25 1600 End: 06/03/25 1911         1806     1911            sodium chloride 0.9 % bolus 1,000 mL  Dose: 1,000 mL  Freq: Once Route: IV  Last Dose: Stopped (06/03/25 1712)  Start: 06/03/25 1445 End: 06/03/25 1712         1512     1712            sodium chloride 3 % inhalation solution 4 mL  Dose: 4 mL  Freq: Every 8 hours (RESP) Route: NEBULIZATION  Start: 06/07/25 1000 End: 06/07/25 1032   Admin Instructions:      Order specific questions:                1032-D/C'd  (1040)        sodium chloride 3 % inhalation solution 4 mL  Dose: 4 mL  Freq: Every 8 hours Route: NEBULIZATION  Start: 06/06/25 1745 End: 06/06/25 1748   Admin Instructions:      Order specific questions:               1748-D/C'd  (1813)         tamsulosin (FLOMAX) capsule 0.4 mg  Dose: 0.4 mg  Freq: Daily with dinner Route: PO  Start: 06/06/25 1630   Admin Instructions:               7491       1630        tamsulosin (FLOMAX) capsule 0.4 mg  Dose: 0.4 mg  Freq: Daily with dinner Route: PO  Start: 05/25/25 1630 End: 05/28/25 1622   Admin Instructions:                   thiamine (VITAMIN B1) 500 mg in dextrose 5 % 100 mL IVPB  Dose: 500 mg  Freq: Every 8 hours Route: IV  Last Dose: 500 mg (06/07/25 1124)  Start: 06/07/25 1100 End: 06/10/25 1059             1124     1900        thiamine tablet 100 mg  Dose: 100 mg  Freq: Daily Route: PO  Start: 06/06/25 0900 End: 06/07/25 0957            0928      0855     0957-D/C'd      thiamine tablet 100 mg  Dose: 100 mg  Freq: Daily Route: PO  Start: 05/25/25 0900 End: 05/28/25 1622                traMADol (ULTRAM) tablet 50 mg  Dose: 50 mg  Freq: Once Route: PO  Start: 06/06/25 0945 End: 06/06/25 1037   Admin Instructions:               1037         vancomycin (VANCOCIN) capsule 125 mg  Dose: 125 mg  Freq: Every 6 hours scheduled Route: PO  Start: 06/06/25 0000   Admin Instructions:      Order specific questions:               0159     0606     1218     1704     2300      0632     1200     1800        vancomycin (VANCOCIN) capsule 125 mg  Dose: 125 mg  Freq: Every 6 hours scheduled Route: PO  Start: 05/25/25 1800 End: 05/28/25 1622   Admin Instructions:      Order specific questions:                   vancomycin (VANCOCIN) oral solution 125 mg  Dose: 125 mg  Freq: Once Route: PO  Start: 06/03/25 1115 End: 06/03/25 1219   Admin Instructions:      Order specific questions:            1219            Legend:       [id number]/0406/0506/0606/07        Continuous Meds Sorted by Name  for Gregg Partida as of 05/29/25 through 6/7/25  Legend:       Medications 05/29 05/30 05/31 06/01 06/02 06/03 06/04 06/05 06/06 06/07   dextrose 5 % and sodium chloride 0.9 % infusion  Rate: 100 mL/hr Dose: 100 mL/hr  Freq: Continuous Route: IV  Last Dose: Stopped (06/06/25 1039)  Start: 06/05/25 2200 End: 06/06/25 1038           2202      0905      1038-D/C'd  1039 [C]         diltiazem (CARDIZEM) 125 mg in sodium chloride 0.9 % 125 mL infusion  Rate: 1-15 mL/hr Dose: 1-15 mg/hr  Freq: Titrated Route: IV  Last Dose: Stopped (06/06/25 1600)  Start: 06/05/25 2215 End: 06/06/25 1617   Admin Instructions:              2247     2306     2319      0015     0300     1300     1400     1445     1515     1600 [C]     1617-D/C'd       lactated ringers infusion  Rate: 50 mL/hr Dose: 50 mL/hr  Freq: Continuous Route: IV  Last Dose: 50 mL/hr (05/26/25 1910)  Start: 05/25/25 0515 End: 05/28/25 1622                Legend:       [id number]/0106/0206/0306/0406/0506/0606/07        PRN Meds Sorted by Name  for HelgaGregg as of 05/29/25 through 6/7/25  Legend:       Medications 05/29 05/30 05/31 06/01 06/02 06/03 06/04 06/05 06/06 06/07   acetaminophen (TYLENOL) tablet 650 mg  Dose: 650 mg  Freq: Every 6 hours PRN Route: PO  PRN Reasons: mild pain,headaches,fever  Indications of Use: FEVER,HEADACHE,MILD PAIN  Start: 05/24/25 2340 End: 05/28/25 1622                albuterol (PROVENTIL HFA,VENTOLIN HFA) inhaler 2 puff  Dose: 2 puff  Freq: Every 6 hours PRN Route: IN  PRN Reasons: wheezing,shortness of breath  Start: 06/05/25 2343 End: 06/07/25 1006   Admin Instructions:                1006-D/C'd      albuterol (PROVENTIL HFA,VENTOLIN HFA) inhaler 2 puff  Dose: 2 puff  Freq: Every 4 hours PRN Route: IN  PRN Reason: wheezing  PRN Comment: First line  Start: 05/25/25 0100 End: 05/28/25 1622   Admin Instructions:                   aluminum-magnesium hydroxide-simethicone (MAALOX) oral suspension 30 mL  Dose: 30 mL  Freq: Every 4 hours PRN Route: PO  PRN Reasons: indigestion,heartburn  Start: 05/24/25 2340 End: 05/28/25 1622   Admin Instructions:                   aluminum-magnesium hydroxide-simethicone (MAALOX) oral suspension 5 mL  Dose: 5 mL  Freq: Every 4 hours PRN Route: PO  PRN Reasons: indigestion,heartburn  Start: 06/05/25 2343 End: 06/05/25 4398    Admin Instructions:              2351-D/C'd        levalbuterol (XOPENEX) inhalation solution 0.63 mg  Dose: 0.63 mg  Freq: Every 6 hours PRN Route: NEBULIZATION  PRN Reason: wheezing  Start: 06/06/25 2104 End: 06/07/25 1006 2109 1006-D/C'd            Vital Signs:   Vital Signs (last 3 days)       Date/Time Temp Pulse Resp BP MAP (mmHg) SpO2 Calculated FIO2 (%) - Nasal Cannula O2 Flow Rate (L/min) Nasal Cannula O2 Flow Rate (L/min) O2 Device Patient Position - Orthostatic VS Belen Coma Scale Score CIWA-Ar Total Pain    06/07/25 0719 -- -- -- -- -- -- 40 5 L/min 5 L/min Mid flow nasal cannula -- -- -- --    06/07/25 0700 97.9 °F (36.6 °C) -- -- -- -- -- -- -- -- -- -- -- -- --    06/07/25 0630 -- 69 24 115/72 87 93 % -- -- -- -- -- -- -- --    06/07/25 0600 -- 66 23 106/61 77 93 % -- -- -- -- -- -- 9 --    06/07/25 0500 -- 66 22 106/64 79 95 % -- -- -- -- -- -- 4 --    06/07/25 0430 -- 67 23 117/66 87 95 % -- -- -- -- -- -- -- --    06/07/25 0400 98 °F (36.7 °C) 65 23 105/58 76 94 % 44 -- 6 L/min Mid flow nasal cannula Lying -- -- --    06/07/25 0330 -- 63 26 96/56 71 96 % -- -- -- -- -- 14 10 --    06/07/25 0318 98 °F (36.7 °C) 67 23 117/66 -- 95 % -- -- -- -- -- -- -- --    06/07/25 0300 -- 61 23 99/60 74 95 % -- -- -- -- -- -- -- --    06/07/25 0238 -- -- -- -- -- 95 % 44 -- 6 L/min Mid flow nasal cannula -- -- -- --    06/07/25 0230 -- 63 22 103/60 76 95 % 44 -- 6 L/min Mid flow nasal cannula -- -- -- --    06/07/25 0200 -- 70 30 87/61 70 97 % -- -- -- -- -- -- 9 --    06/07/25 0130 -- 66 22 103/67 78 98 % -- -- -- -- -- -- -- --    06/07/25 0100 -- 69 -- 98/52 -- -- -- -- -- -- -- 14 13 --    06/07/25 0030 -- 68 24 87/54 66 97 % -- -- -- -- -- -- -- --    06/07/25 0015 -- -- -- -- -- -- -- -- -- -- -- 14 -- --    06/07/25 0000 97.5 °F (36.4 °C) 70 20 101/56 80 97 % 68 -- 12 L/min Mid flow nasal cannula Lying -- 10 --    06/06/25 2300 -- 76 19 97/54 73 94 % -- -- -- -- -- 15 -- --    06/06/25  2245 -- 74 -- 137/73 -- -- -- -- -- -- -- -- 11 --    06/06/25 2200 -- 74 16 134/76 99 97 % -- -- -- -- -- -- -- --    06/06/25 2110 -- -- -- -- -- 96 % -- -- -- -- -- -- -- --    06/06/25 2100 -- 75 18 123/78 96 97 % -- -- -- -- -- -- -- --    06/06/25 2000 98.2 °F (36.8 °C) 80 19 136/78 100 96 % 68 -- 12 L/min Mid flow nasal cannula Lying 15 6 2    06/06/25 1954 -- -- -- -- -- 96 % 68 -- 12 L/min Mid flow nasal cannula -- -- -- --    06/06/25 1900 98.1 °F (36.7 °C) 85 21 132/78 100 96 % 68 -- 12 L/min Mid flow nasal cannula Lying -- 6 --    06/06/25 1730 -- -- -- -- -- -- -- -- -- -- -- -- -- Med Not Given for Pain - for MAR use only    06/06/25 1727 -- 96 21 -- -- 95 % 70 -- 12 L/min Mid flow nasal cannula -- -- -- --    06/06/25 1600 98.2 °F (36.8 °C) 91 19 127/75 97 90 % 36 -- 4 L/min Nasal cannula Lying -- 8 3    06/06/25 1515 -- 87 -- -- -- -- -- -- -- -- -- -- -- --    06/06/25 1500 -- 90 20 132/80 102 92 % -- -- -- -- -- -- -- --    06/06/25 1400 -- 90 19 129/71 96 91 % -- -- -- -- -- -- -- --    06/06/25 1300 -- 94 18 135/68 95 90 % -- -- -- -- -- -- -- --    06/06/25 1200 -- 100 16 139/71 96 92 % 28 -- 2 L/min Nasal cannula Lying -- 7 5    06/06/25 1131 98.3 °F (36.8 °C) -- -- -- -- -- -- -- -- -- -- -- -- --    06/06/25 1100 -- 103 -- 142/71 102 91 % -- -- -- -- -- -- -- --    06/06/25 1037 -- -- -- -- -- -- -- -- -- -- -- -- -- 7 06/06/25 1000 -- 104 -- 140/68 97 95 % -- -- -- -- -- -- -- --    06/06/25 0900 -- 107 -- 138/70 95 91 % -- -- -- -- -- -- -- --    06/06/25 0800 98 °F (36.7 °C) 115 20 156/75 108 92 % 28 -- 2 L/min Nasal cannula Sitting -- 7 6    06/06/25 0700 -- 105 22 142/81 106 94 % -- -- -- -- -- -- -- --    06/06/25 0600 -- 98 20 131/72 96 94 % -- -- -- -- -- -- -- --    06/06/25 0530 -- 102 23 136/73 99 92 % -- -- -- -- -- -- -- --    06/06/25 0515 -- 107 20 137/71 97 93 % -- -- -- -- -- -- -- --    06/06/25 0500 -- 103 22 128/71 93 92 % -- -- -- -- -- -- -- --    06/06/25 0445 -- 102  22 122/70 91 93 % -- -- -- -- -- -- -- --    06/06/25 0430 -- 104 23 128/69 93 92 % -- -- -- -- -- -- -- --    06/06/25 0415 -- 109 21 147/78 104 93 % -- -- -- -- -- -- -- --    06/06/25 0400 97.5 °F (36.4 °C) 105 21 152/75 104 93 % -- -- -- -- -- -- 6 --    06/06/25 0345 -- 118 23 156/82 112 94 % -- -- -- -- -- -- -- --    06/06/25 0330 -- 134 41 134/73 91 96 % -- -- -- -- -- -- -- --    06/06/25 0315 -- 97 19 133/72 97 96 % -- -- -- -- -- -- -- --    06/06/25 0300 -- 116 22 143/85 112 97 % -- -- -- -- -- -- -- --    06/06/25 0245 -- 98 18 142/81 106 97 % -- -- -- -- -- -- -- --    06/06/25 0230 -- 102 18 138/78 102 99 % -- -- -- -- -- -- -- --    06/06/25 0215 -- 105 24 157/76 109 -- -- -- -- -- -- -- -- --    06/06/25 0200 -- 107 20 160/78 112 -- -- -- -- -- -- -- -- --    06/06/25 0145 -- 97 20 154/79 106 -- -- -- -- -- -- -- -- --    06/06/25 0130 -- 103 21 140/84 107 -- -- -- -- -- -- -- -- --    06/06/25 0115 -- 101 22 139/82 105 -- -- -- -- -- -- -- -- --    06/06/25 0100 -- 112 21 150/85 111 -- -- -- -- -- -- -- -- --    06/06/25 0045 -- 118 21 171/97 124 -- -- -- -- -- -- -- -- --    06/06/25 0030 -- 120 19 169/86 120 -- -- -- -- -- -- -- -- --    06/06/25 0015 97.8 °F (36.6 °C) 124 13 163/87 117 98 % 32 -- 3 L/min Nasal cannula -- 15 6 3    06/05/25 2329 -- 130 14 134/86 103 95 % -- -- -- -- -- -- -- --    06/05/25 2319 -- 155 -- 169/71 -- -- -- -- -- -- -- -- -- --    06/05/25 2306 -- 128 -- 161/89 -- -- -- -- -- -- -- -- -- --    06/05/25 2247 -- 129 -- 151/82 -- -- -- -- -- -- -- -- -- --    06/05/25 2200 -- 125 19 131/79 99 -- -- -- -- -- -- -- -- --    06/05/25 2141 97.6 °F (36.4 °C) -- -- -- -- -- -- -- -- -- -- -- -- --    06/05/25 2140 -- 140 16 129/66 -- 93 % -- -- -- None (Room air) Sitting -- -- 3            Pertinent Labs/Diagnostic Results:   Radiology:  CT head without contrast   Final Interpretation by Nereyda Sotelo MD (06/05 2319)      No acute intracranial abnormality.                   Workstation performed: [id number]         CT spine cervical without contrast   Final Interpretation by Nereyda Sotelo MD (06/05 2320)      No cervical spine fracture or traumatic malalignment.                  Workstation performed: [id number]         XR chest 1 view portable   ED Interpretation by Seven Severino MD (06/05 2327)   No acute cardiopulm process      Final Interpretation by Bradley Landon Kocher, MD (06/06 1622)      Left basilar opacity in keeping with effusion and a component of atelectasis or infection.            Workstation performed: [id number]           Cardiology:  ECG 12 lead   Final Result by Alec Hernández MD (06/06 1426)   Atrial fibrillation with rapid ventricular response   Right axis deviation   T wave abnormality, consider inferior ischemia   T wave abnormality, consider anterior ischemia   Abnormal ECG      Confirmed by Alec Hernández ([id number]) on 6/6/2025 2:26:33 PM      ECG 12 lead   Final Result by Alec Hernández MD (06/06 1426)   Atrial fibrillation with rapid ventricular response   Right axis deviation   ST & T wave abnormality, consider inferior ischemia   Abnormal ECG      Confirmed by Alec Hernández ([id number]) on 6/6/2025 2:26:03 PM        GI:  No orders to display         Results from last 7 days   Lab Units 06/06/25  0605 06/05/25 2147 06/03/25  1502   WBC Thousand/uL 6.04 6.83 4.40   HEMOGLOBIN g/dL 8.8* 9.7* 10.3*   HEMATOCRIT % 27.7* 31.2* 33.2*   PLATELETS Thousands/uL 120* 145* 203   TOTAL NEUT ABS Thousands/µL 4.02 5.66 1.74*         Results from last 7 days   Lab Units 06/07/25  0609 06/06/25  0605 06/05/25 2147 06/03/25  1502   SODIUM mmol/L 134* 143 143 147   POTASSIUM mmol/L 4.3 3.8 4.1 3.7   CHLORIDE mmol/L 100 104 102 106   CO2 mmol/L 21 27 21 29   ANION GAP mmol/L 13 12 20* 12   BUN mg/dL 13 11 12 10   CREATININE mg/dL 1.25 0.93 1.11 0.84   EGFR ml/min/1.73sq m 60 87 70 93   CALCIUM mg/dL 8.1* 7.5* 7.7* 7.8*   MAGNESIUM mg/dL  --  2.0 1.2*  --       Results from last 7 days   Lab Units 06/07/25  0609 06/06/25  0605 06/05/25 2147   AST U/L 61* 96* 124*   ALT U/L 43 55* 65*   ALK PHOS U/L 148* 152* 166*   TOTAL PROTEIN g/dL 6.5 6.5 7.0   ALBUMIN g/dL 3.3* 3.3* 3.6   TOTAL BILIRUBIN mg/dL 1.67* 0.77 0.81     Results from last 7 days   Lab Units 06/07/25  0714 06/07/25  0039 06/06/25  1037 06/06/25  0220 06/05/25  2154   POC GLUCOSE mg/dl 139 151* 164* 70 61*     Results from last 7 days   Lab Units 06/07/25  0609 06/06/25  0605 06/05/25  2147 06/03/25  1502   GLUCOSE RANDOM mg/dL 111 97 64* 76             BETA-HYDROXYBUTYRATE   Date Value Ref Range Status   01/22/2024 3.2 (H) <0.6 mmol/L Final                  Results from last 7 days   Lab Units 06/05/25  2353   CK TOTAL U/L 183     Results from last 7 days   Lab Units 06/06/25  0143 06/05/25  2353 06/05/25  2147   HS TNI 0HR ng/L  --   --  70*   HS TNI 2HR ng/L  --  173*  --    HSTNI D2 ng/L  --  103*  --    HS TNI 4HR ng/L 192*  --   --    HSTNI D4 ng/L 122*  --   --              Results from last 7 days   Lab Units 06/05/25  2147   TSH 3RD GENERATON uIU/mL 0.487                     Results from last 7 days   Lab Units 06/05/25  2147   BNP pg/mL 321*                                                 Results from last 7 days   Lab Units 06/05/25  2147   ETHANOL LVL mg/dL 346*                                       Network Utilization Review Department  ATTENTION: Please call with any questions or concerns to [phone] and carefully listen to the prompts so that you are directed to the right person. All voicemails are confidential.   For Discharge needs, contact Care Management DC Support Team at [phone] opt. [phone]  Send all requests for admission clinical reviews, approved or denied determinations and any other requests to dedicated fax number below belonging to the campus where the patient is receiving treatment. List of dedicated fax numbers for the Facilities:  FACILITY NAME UR FAX NUMBER   ADMISSION  DENIALS (Administrative/Medical Necessity) [phone]   DISCHARGE SUPPORT TEAM (NETWORK) [phone]   PARENT CHILD HEALTH (Maternity/NICU/Pediatrics) [phone]   [facility] [phone]   [facility] [phone]   [facility] [phone]   [facility] [phone]   [facility] [phone]   [facility] [phone]   [facility] [phone]   [facility] [phone]   [facility] [phone]   [facility] [phone]   [facility] [phone]   [facility] [phone]

## 2025-06-07 NOTE — PROGRESS NOTES
Progress Note - Family Medicine   Name: Gregg Partida 65 y.o. male I MRN: [id number]  Unit/Bed#: ICU 03 I Date of Admission: 6/5/2025   Date of Service: 6/7/2025 I Hospital Day: 2    Assessment & Plan  Acute respiratory failure with hypoxia (HCC)  Patient with recent fall, alcohol withdrawal and aspiration risk presented yesterday with acute hypoxia requiring 12 L of oxygen, now on 5 L  Patient is otherwise afebrile  Likely contributing factors recent alcohol abuse, poor p.o. intake, high aspiration risk    Follow-up chest x-ray final read, preliminary read showing complete left lung whiteout - likely atelectasis, possible mucous plug or aspiration pneumonitis  Continue with sodium chloride nebs every 8 hours  Continue levalbuterol nebs every 8 hours  Use Acapella device and incentive spirometry aggressively  Chest physiotherapy and repositioning  Follow-up chest x-ray in 12 to 24 hours to monitor resolution  Consider CT chest if no improvement or diagnosis unclear  Pulmonology consulted  Consider bronchoscopy for direct suctioning of mucous plug if no improvement  Atrial fibrillation with RVR (HCC)  Presented to ED after alcohol intoxication and fall  EKG showing A-fib with RVR  Suspect secondary to alcohol use, or not using medications  Home meds: Eliquis 5mg BID, metoprolol 100mg BID, Cardizem 120mg BID    -CHADVASC score 3  -EKG A-fib w/RVR, rate 166    ED course: IV cardizem 10mgx1, D5NS, cardizem drip, IV mag 2gx1    Currently off cardizem drip  Continue with metoprolol 100 mg p.o. twice daily, Cardizem 120 mg p.o. twice daily and Eliquis 5 mg twice daily   Telemetry - currently rate controlled  Alcohol intoxication (HCC)  Last drink morning of ED visit  Does not use home medication Naltrexone 50mg daily    Ground level fall on afternoon of initial presentation    POA CIWA 9    -Initial EtOH: 346  -CT head: No acute intracranial abnormality  -CT spine: No cervical spine fracture or traumatic  malalignment    6/7 : Worsening CIWA score, last CIWA 32 at 10 AM    Transfer to ICU level of care for phenobarbital  Continue CIWA protocol  Thiamine, folic acid, multivitamin  Seizure, fall, aspiration precautions  Monitor telemetry  Hold home Naltrexone  PT/OT  Opacity of lung on imaging study    CXR left basilar opacity     Ddx effusion vs atelectasis vs infxn    Incentive spirometry  Elevated troponin  Troponin 70>173>192    Non-MI troponin elevation in setting of A-fib with RVR  Increased anion gap (Resolved: 6/7/2025)  Initial labs notable for: AG 20.   Suspect secondary to starvation/dehydration.     History of prostate cancer  Chronic, stable  S/p resection 2021    Home meds Flomax 0.4mg    Continue home med  Recurrent Clostridioides difficile diarrhea  Hx recurrent C diff infection  Most recently diagnosed 5/8 hospitalization. Started on PO vancomycin 125mg q6h 5/8  Did not take medications last few days    Continue with PO vancomycin  Contact precautions  Probiotics  Fall  Ground level fall on afternoon of ED visit. Denies preceding headache, dizziness, lightheadedness  Multiple falls due to drinking alcohol in the past    CT head and spine unremarkable    Fall precautions  PT/OT  Type 2 diabetes mellitus, without long-term current use of insulin ([facility])  Lab Results   Component Value Date    HGBA1C 5.2 05/05/2025       Recent Labs     06/06/25  0220 06/06/25  1037 06/07/25  0039 06/07/25  0714   POCGLU 70 164* 151* 139       Blood Sugar Average: Last 72 hrs:  (P) 117    Chronic, unstable.  Home meds: Gabapentin 300mg TID, Metformin 500mg daily  Hypoglycemic initially likely secondary to poor PO intake    Hold metformin  Continue gabapentin  ISS  Hypoglycemia protocol  Carb controlled diet    COPD (chronic obstructive pulmonary disease) (HCC)  Chronic, stable    Home meds: Breo-Elipta daily    Continue breo and nebulizers PRN  Electrolyte abnormality  Initial labs notable for  Mg 1.2: repleted w/ IV mag 2g  x1 while in ED.     Monitor and replete as indicated  Hypoglycemia (Resolved: 6/6/2025)  Per ED report, patient hypoglycemic on EMS assessment.  Initial ED labs notable for BG of 61.   Started D5NS while in ED.  Suspect secondary to poor PO intake     Accuchecks [facility]  Hypoglycemia protocol   CAD (coronary artery disease)  CAD s/p CABG in 2004.   Home medications: ASA 81 mg, Ranexa 500 mg BID,   No chest pain endorsed at time of initial assessment.     Aspirin daily  Continue Ranexa  Thrombocytopenia (HCC)  Noted history of thrombocytopenia.   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  No active signs of bleeding at time of examination.     Continue anticoagulation with Eliquis in setting of A-fib w/ RVR  Continue aspirin  Monitor for signs of bleeding  Transaminitis  Initial labs notable for: , ALT 65,   Suspect secondary to acute on chronic alcohol use.     Improving  6/7 : AST 61, ALT 43,     Continue with Lipitor  Continue to monitor   Chronic heart failure with preserved ejection fraction (HCC)  Chronic, stable.   Patient appears euvolemic on initial assessment.   -Initial labs notable for:     Monitor fluid status    Wt Readings from Last 3 Encounters:   06/06/25 76.3 kg (168 lb 3.4 oz)   05/28/25 76 kg (167 lb 8.8 oz)   05/13/25 79.1 kg (174 lb 4.8 oz)     VTE Pharmacologic Prophylaxis: VTE Score: 4 Moderate Risk (Score 3-4) - Pharmacological DVT Prophylaxis Ordered: apixaban (Eliquis).    Mobility:   Basic Mobility Inpatient Raw Score: 7  JH-HLM Goal: 2: Bed activities/Dependent transfer  JH-HLM Achieved: 2: Bed activities/Dependent transfer  JH-HLM Goal NOT achieved. Continue with multidisciplinary rounding and encourage appropriate mobility to improve upon JH-HLM goals.    Patient Centered Rounds: I performed bedside rounds with nursing staff today.   Discussions with Specialists or Other Care Team Provider: Case management    Education and Discussions with  Family / Patient: Patient declined call to .     Current Length of Stay: 2 day(s)  Current Patient Status: Inpatient   Certification Statement: The patient will continue to require additional inpatient hospital stay due to alcohol w/drawal  Discharge Plan: pending    Code Status: Level 1 - Full Code    Subjective   Patient was seen and examined at bedside.  The previous night he was noted to have visible hallucinations and attempting to climb out of bed.  He received total of 7 mg of Ativan throughout the night.  Today morning he is calm and cooperative, oriented to time place and person.  He is tolerating p.o. intake    Objective :  Temp:  [97.5 °F (36.4 °C)-98.3 °F (36.8 °C)] 97.9 °F (36.6 °C)  HR:  [] 97  BP: ()/(52-82) 116/65  Resp:  [16-30] 18  SpO2:  [90 %-98 %] 94 %  O2 Device: Nasal cannula  Nasal Cannula O2 Flow Rate (L/min):  [2 L/min-12 L/min] 5 L/min    Body mass index is 21.6 kg/m².     Input and Output Summary (last 24 hours):     Intake/Output Summary (Last 24 hours) at 6/7/2025 1122  Last data filed at 6/7/2025 0800  Gross per 24 hour   Intake 740 ml   Output 820 ml   Net -80 ml       Physical Exam  Vitals and nursing note reviewed.   Constitutional:       General: He is not in acute distress.     Appearance: He is well-developed.   HENT:      Head: Normocephalic and atraumatic.     Eyes:      Conjunctiva/sclera: Conjunctivae normal.       Cardiovascular:      Rate and Rhythm: Normal rate and regular rhythm.      Heart sounds: No murmur heard.  Pulmonary:      Effort: Pulmonary effort is normal. No respiratory distress.      Breath sounds: Normal breath sounds.   Abdominal:      Palpations: Abdomen is soft.      Tenderness: There is no abdominal tenderness.     Musculoskeletal:         General: No swelling.      Cervical back: Neck supple.     Skin:     General: Skin is warm and dry.      Capillary Refill: Capillary refill takes less than 2 seconds.     Neurological:       General: No focal deficit present.      Mental Status: He is alert and oriented to person, place, and time. Mental status is at baseline.     Psychiatric:         Mood and Affect: Mood normal.         Lines/Drains:  Lines/Drains/Airways       Active Status       Name Placement date Placement time Site Days    External Urinary Catheter 06/06/25  0200  -- 1                      Telemetry:  Telemetry Orders (From admission, onward)               24 Hour Telemetry Monitoring  Continuous x 24 Hours (Telem)        Expiring   Question:  Reason for 24 Hour Telemetry  Answer:  Arrhythmias requiring acute medical intervention / PPM or ICD malfunction                     Telemetry Reviewed: Atrial fibrillation. HR averaging 90  Indication for Continued Telemetry Use: Arrthymias requiring medical therapy               Lab Results: I have reviewed the following results:   Results from last 7 days   Lab Units 06/07/25  0642   WBC Thousand/uL 4.72   HEMOGLOBIN g/dL 8.6*   HEMATOCRIT % 27.6*   PLATELETS Thousands/uL 70*   SEGS PCT % 70   LYMPHO PCT % 20   MONO PCT % 8   EOS PCT % 1     Results from last 7 days   Lab Units 06/07/25  0609   SODIUM mmol/L 134*   POTASSIUM mmol/L 4.3   CHLORIDE mmol/L 100   CO2 mmol/L 21   BUN mg/dL 13   CREATININE mg/dL 1.25   ANION GAP mmol/L 13   CALCIUM mg/dL 8.1*   ALBUMIN g/dL 3.3*   TOTAL BILIRUBIN mg/dL 1.67*   ALK PHOS U/L 148*   ALT U/L 43   AST U/L 61*   GLUCOSE RANDOM mg/dL 111         Results from last 7 days   Lab Units 06/07/25  0714 06/07/25  0039 06/06/25  1037 06/06/25  0220 06/05/25  2154   POC GLUCOSE mg/dl 139 151* 164* 70 61*               Recent Cultures (last 7 days):             Last 24 Hours Medication List:     Current Facility-Administered Medications:     acetylcysteine (MUCOMYST) 200 mg/mL inhalation solution 600 mg, Q8H    apixaban (ELIQUIS) tablet 5 mg, BID    aspirin chewable tablet 81 mg, Daily    atorvastatin (LIPITOR) tablet 40 mg, Daily    chlordiazePOXIDE (LIBRIUM)  capsule 25 mg, Q6H    diltiazem (CARDIZEM SR) 12 hr capsule 120 mg, BID    ferrous sulfate tablet 325 mg, Daily With Breakfast    fluticasone-vilanterol 200-25 mcg/actuation 1 puff, Daily    folic acid (FOLVITE) tablet 400 mcg, Daily    gabapentin (NEURONTIN) capsule 300 mg, TID    insulin lispro (HumALOG/ADMELOG) 100 units/mL subcutaneous injection 1-5 Units, HS    insulin lispro (HumALOG/ADMELOG) 100 units/mL subcutaneous injection 1-5 Units, TID AC **AND** Fingerstick Glucose (POCT), 4x Daily AC and at bedtime    levalbuterol (XOPENEX) inhalation solution 0.63 mg, Q8H **AND** sodium chloride 0.9 % inhalation solution 3 mL, Q8H    metoprolol succinate (TOPROL-XL) 24 hr tablet 100 mg, BID    multivitamin-minerals (CENTRUM) tablet 1 tablet, Daily    nicotine (NICODERM CQ) 21 mg/24 hr TD 24 hr patch 1 patch, Q24H    pantoprazole (PROTONIX) EC tablet 40 mg, Daily    ranolazine (RANEXA) 12 hr tablet 500 mg, BID    saccharomyces boulardii (FLORASTOR) capsule 250 mg, BID    tamsulosin (FLOMAX) capsule 0.4 mg, Daily With Dinner    thiamine (VITAMIN B1) 500 mg in dextrose 5 % 100 mL IVPB, Q8H    vancomycin (VANCOCIN) capsule 125 mg, Q6H SEDA    Administrative Statements   Today, Patient Was Seen By: Bipin Freed MD      **Please Note: This note may have been constructed using a voice recognition system.**

## 2025-06-07 NOTE — ASSESSMENT & PLAN NOTE
6/6 escalating oxygen requirements to 12 L mid flow nasal cannula, weaned to 4L NC 6/7 6/7 chest x-ray with complete opacification of left lung fields    Plan:  Continue aggressive pulmonary hygiene  Chest physiotherapy, flutter valve, IS  Sodium chloride nebs  Mucinex  Wean supplemental oxygen to maintain SPO2 greater than 92%  Consideration for bronchoscopy if patient's clinical status deteriorates

## 2025-06-07 NOTE — ASSESSMENT & PLAN NOTE
Baseline severe EtOH abuse  6/5 presented with ethanol of 346  Requiring frequent as needed doses of Ativan  6/7 transfer to [facility] for phenobarb  Was scheduled for 10 mg/kg of phenobarbital divided in 3 doses for a total of 822 mg  Became lethargic after first loading dose of 329mg. Subsequent doses were held overnight.     Plan:  Additional phenobarb doses pending clinical trajectory.   High-dose thiamine

## 2025-06-07 NOTE — QUICK NOTE
Contacted by nurse around midnight per nursing communication regarding CIWA 9 and was given Ativan 2 mg PO, 2 hours later patient's CIWA score was 10 and he received Ativan 2 mg PO per protocol. Patient also noted to have soft BP with MAP of 68, received LR 500cc bolus. Patient's oxygen was weaned down to 6L midflow, maintaing SpO2 in >94%.     Night team was notified by nurse around 2 am stating patient is restless with CIWA of 11. Upon evaluation patient was laying in bed comfortably, mildly restless, intermittently trying to get out of bed. Few minutes later patient also noted to have visible and audible hallucinations and attempting to climb out of bed. Patient was given 1 mg IV Ativan.     Notified by nurse around 4am regarding continued agitation and restlessness. Patient was examined at bedside. He was visibly restless with audible and visible hallucinations, not oriented to place or time. CIWA at this time is 17. Patient was given Ativan 2 mg IV. Patient was also seen by ICU AP.     Will continue to monitor. Nursing communication updated to notify MD prior to Ativan administration.

## 2025-06-07 NOTE — ASSESSMENT & PLAN NOTE
Chest PT, mucinex, 3% saline IH for mucous clearance  Consideration for therapeutic bronchoscopy if S&S of clinical deterioration   Currently with SpO2 >92% on NC O2

## 2025-06-07 NOTE — ASSESSMENT & PLAN NOTE
Chronic, stable.   Patient appears euvolemic on initial assessment.   -Initial labs notable for:     Monitor fluid status    Wt Readings from Last 3 Encounters:   06/06/25 76.3 kg (168 lb 3.4 oz)   05/28/25 76 kg (167 lb 8.8 oz)   05/13/25 79.1 kg (174 lb 4.8 oz)

## 2025-06-08 ENCOUNTER — APPOINTMENT (INPATIENT)
Dept: RADIOLOGY | Facility: HOSPITAL | Age: 65
DRG: 308 | End: 2025-06-08
Payer: COMMERCIAL

## 2025-06-08 LAB
ALBUMIN SERPL BCG-MCNC: 3.2 G/DL (ref 3.5–5)
ALP SERPL-CCNC: 116 U/L (ref 34–104)
ALT SERPL W P-5'-P-CCNC: 38 U/L (ref 7–52)
ANION GAP SERPL CALCULATED.3IONS-SCNC: 10 MMOL/L (ref 4–13)
AST SERPL W P-5'-P-CCNC: 55 U/L (ref 13–39)
BASOPHILS # BLD AUTO: 0.04 THOUSANDS/ÂΜL (ref 0–0.1)
BASOPHILS NFR BLD AUTO: 1 % (ref 0–1)
BILIRUB SERPL-MCNC: 1.06 MG/DL (ref 0.2–1)
BUN SERPL-MCNC: 14 MG/DL (ref 5–25)
CALCIUM ALBUM COR SERPL-MCNC: 8.5 MG/DL (ref 8.3–10.1)
CALCIUM SERPL-MCNC: 7.9 MG/DL (ref 8.4–10.2)
CARDIAC TROPONIN I PNL SERPL HS: 114 NG/L (ref 8–18)
CHLORIDE SERPL-SCNC: 100 MMOL/L (ref 96–108)
CO2 SERPL-SCNC: 26 MMOL/L (ref 21–32)
CREAT SERPL-MCNC: 1.14 MG/DL (ref 0.6–1.3)
EOSINOPHIL # BLD AUTO: 0.09 THOUSAND/ÂΜL (ref 0–0.61)
EOSINOPHIL NFR BLD AUTO: 2 % (ref 0–6)
ERYTHROCYTE [DISTWIDTH] IN BLOOD BY AUTOMATED COUNT: 17.1 % (ref 11.6–15.1)
GFR SERPL CREATININE-BSD FRML MDRD: 68 ML/MIN/1.73SQ M
GLUCOSE SERPL-MCNC: 104 MG/DL (ref 65–140)
GLUCOSE SERPL-MCNC: 113 MG/DL (ref 65–140)
GLUCOSE SERPL-MCNC: 122 MG/DL (ref 65–140)
GLUCOSE SERPL-MCNC: 124 MG/DL (ref 65–140)
GLUCOSE SERPL-MCNC: 175 MG/DL (ref 65–140)
GLUCOSE SERPL-MCNC: 192 MG/DL (ref 65–140)
HCT VFR BLD AUTO: 31.2 % (ref 36.5–49.3)
HGB BLD-MCNC: 9.7 G/DL (ref 12–17)
IMM GRANULOCYTES # BLD AUTO: 0.03 THOUSAND/UL (ref 0–0.2)
IMM GRANULOCYTES NFR BLD AUTO: 1 % (ref 0–2)
LYMPHOCYTES # BLD AUTO: 0.72 THOUSANDS/ÂΜL (ref 0.6–4.47)
LYMPHOCYTES NFR BLD AUTO: 14 % (ref 14–44)
MAGNESIUM SERPL-MCNC: 1.4 MG/DL (ref 1.9–2.7)
MCH RBC QN AUTO: 31.3 PG (ref 26.8–34.3)
MCHC RBC AUTO-ENTMCNC: 31.1 G/DL (ref 31.4–37.4)
MCV RBC AUTO: 101 FL (ref 82–98)
MONOCYTES # BLD AUTO: 0.28 THOUSAND/ÂΜL (ref 0.17–1.22)
MONOCYTES NFR BLD AUTO: 5 % (ref 4–12)
MRSA NOSE QL CULT: NORMAL
NEUTROPHILS # BLD AUTO: 4.16 THOUSANDS/ÂΜL (ref 1.85–7.62)
NEUTS SEG NFR BLD AUTO: 77 % (ref 43–75)
NRBC BLD AUTO-RTO: 1 /100 WBCS
PLATELET # BLD AUTO: 74 THOUSANDS/UL (ref 149–390)
PMV BLD AUTO: 12 FL (ref 8.9–12.7)
POTASSIUM SERPL-SCNC: 4.2 MMOL/L (ref 3.5–5.3)
PROT SERPL-MCNC: 6.5 G/DL (ref 6.4–8.4)
RBC # BLD AUTO: 3.1 MILLION/UL (ref 3.88–5.62)
SODIUM SERPL-SCNC: 136 MMOL/L (ref 135–147)
WBC # BLD AUTO: 5.32 THOUSAND/UL (ref 4.31–10.16)

## 2025-06-08 PROCEDURE — 94760 N-INVAS EAR/PLS OXIMETRY 1: CPT

## 2025-06-08 PROCEDURE — 99232 SBSQ HOSP IP/OBS MODERATE 35: CPT | Performed by: INTERNAL MEDICINE

## 2025-06-08 PROCEDURE — 83735 ASSAY OF MAGNESIUM: CPT

## 2025-06-08 PROCEDURE — 94669 MECHANICAL CHEST WALL OSCILL: CPT

## 2025-06-08 PROCEDURE — 82948 REAGENT STRIP/BLOOD GLUCOSE: CPT

## 2025-06-08 PROCEDURE — NC001 PR NO CHARGE: Performed by: INTERNAL MEDICINE

## 2025-06-08 PROCEDURE — 84484 ASSAY OF TROPONIN QUANT: CPT | Performed by: NURSE PRACTITIONER

## 2025-06-08 PROCEDURE — 94664 DEMO&/EVAL PT USE INHALER: CPT

## 2025-06-08 PROCEDURE — 85025 COMPLETE CBC W/AUTO DIFF WBC: CPT

## 2025-06-08 PROCEDURE — 97163 PT EVAL HIGH COMPLEX 45 MIN: CPT

## 2025-06-08 PROCEDURE — 94640 AIRWAY INHALATION TREATMENT: CPT

## 2025-06-08 PROCEDURE — 71045 X-RAY EXAM CHEST 1 VIEW: CPT

## 2025-06-08 PROCEDURE — 97530 THERAPEUTIC ACTIVITIES: CPT

## 2025-06-08 PROCEDURE — 80053 COMPREHEN METABOLIC PANEL: CPT

## 2025-06-08 RX ORDER — INSULIN LISPRO 100 [IU]/ML
1-5 INJECTION, SOLUTION INTRAVENOUS; SUBCUTANEOUS
Status: DISCONTINUED | OUTPATIENT
Start: 2025-06-08 | End: 2025-06-10

## 2025-06-08 RX ORDER — SODIUM CHLORIDE, SODIUM GLUCONATE, SODIUM ACETATE, POTASSIUM CHLORIDE, MAGNESIUM CHLORIDE, SODIUM PHOSPHATE, DIBASIC, AND POTASSIUM PHOSPHATE .53; .5; .37; .037; .03; .012; .00082 G/100ML; G/100ML; G/100ML; G/100ML; G/100ML; G/100ML; G/100ML
500 INJECTION, SOLUTION INTRAVENOUS ONCE
Status: COMPLETED | OUTPATIENT
Start: 2025-06-08 | End: 2025-06-08

## 2025-06-08 RX ORDER — MAGNESIUM SULFATE HEPTAHYDRATE 40 MG/ML
4 INJECTION, SOLUTION INTRAVENOUS ONCE
Status: COMPLETED | OUTPATIENT
Start: 2025-06-08 | End: 2025-06-08

## 2025-06-08 RX ORDER — INSULIN LISPRO 100 [IU]/ML
1-5 INJECTION, SOLUTION INTRAVENOUS; SUBCUTANEOUS
Status: DISCONTINUED | OUTPATIENT
Start: 2025-06-08 | End: 2025-06-12 | Stop reason: HOSPADM

## 2025-06-08 RX ADMIN — DILTIAZEM HYDROCHLORIDE 120 MG: 60 CAPSULE, EXTENDED RELEASE ORAL at 17:04

## 2025-06-08 RX ADMIN — ALBUTEROL SULFATE 2.5 MG: 2.5 SOLUTION RESPIRATORY (INHALATION) at 23:15

## 2025-06-08 RX ADMIN — Medication 250 MG: at 17:04

## 2025-06-08 RX ADMIN — DILTIAZEM HYDROCHLORIDE 120 MG: 60 CAPSULE, EXTENDED RELEASE ORAL at 08:01

## 2025-06-08 RX ADMIN — FERROUS SULFATE TAB 325 MG (65 MG ELEMENTAL FE) 325 MG: 325 (65 FE) TAB at 07:42

## 2025-06-08 RX ADMIN — MAGNESIUM SULFATE HEPTAHYDRATE 4 G: 40 INJECTION, SOLUTION INTRAVENOUS at 07:42

## 2025-06-08 RX ADMIN — Medication 1 TABLET: at 08:00

## 2025-06-08 RX ADMIN — RANOLAZINE 500 MG: 500 TABLET, FILM COATED, EXTENDED RELEASE ORAL at 08:00

## 2025-06-08 RX ADMIN — METOPROLOL SUCCINATE 100 MG: 100 TABLET, EXTENDED RELEASE ORAL at 17:04

## 2025-06-08 RX ADMIN — INSULIN LISPRO 1 UNITS: 100 INJECTION, SOLUTION INTRAVENOUS; SUBCUTANEOUS at 21:20

## 2025-06-08 RX ADMIN — THIAMINE HYDROCHLORIDE 500 MG: 100 INJECTION, SOLUTION INTRAMUSCULAR; INTRAVENOUS at 11:59

## 2025-06-08 RX ADMIN — VANCOMYCIN HYDROCHLORIDE 125 MG: 125 CAPSULE ORAL at 17:04

## 2025-06-08 RX ADMIN — ALBUTEROL SULFATE 2.5 MG: 2.5 SOLUTION RESPIRATORY (INHALATION) at 00:05

## 2025-06-08 RX ADMIN — SODIUM CHLORIDE SOLN NEBU 3% 4 ML: 3 NEBU SOLN at 07:15

## 2025-06-08 RX ADMIN — Medication 250 MG: at 08:00

## 2025-06-08 RX ADMIN — TAMSULOSIN HYDROCHLORIDE 0.4 MG: 0.4 CAPSULE ORAL at 17:04

## 2025-06-08 RX ADMIN — GUAIFENESIN 600 MG: 600 TABLET, EXTENDED RELEASE ORAL at 20:41

## 2025-06-08 RX ADMIN — FLUTICASONE FUROATE AND VILANTEROL TRIFENATATE 1 PUFF: 200; 25 POWDER RESPIRATORY (INHALATION) at 08:01

## 2025-06-08 RX ADMIN — THIAMINE HYDROCHLORIDE 500 MG: 100 INJECTION, SOLUTION INTRAMUSCULAR; INTRAVENOUS at 18:27

## 2025-06-08 RX ADMIN — Medication 6 MG: at 21:19

## 2025-06-08 RX ADMIN — ALBUTEROL SULFATE 2.5 MG: 2.5 SOLUTION RESPIRATORY (INHALATION) at 07:15

## 2025-06-08 RX ADMIN — ALBUTEROL SULFATE 2.5 MG: 2.5 SOLUTION RESPIRATORY (INHALATION) at 15:02

## 2025-06-08 RX ADMIN — GUAIFENESIN 600 MG: 600 TABLET, EXTENDED RELEASE ORAL at 08:00

## 2025-06-08 RX ADMIN — NICOTINE 1 PATCH: 21 PATCH, EXTENDED RELEASE TRANSDERMAL at 00:43

## 2025-06-08 RX ADMIN — SODIUM CHLORIDE SOLN NEBU 3% 4 ML: 3 NEBU SOLN at 15:02

## 2025-06-08 RX ADMIN — ASPIRIN 81 MG: 81 TABLET, CHEWABLE ORAL at 08:00

## 2025-06-08 RX ADMIN — SODIUM CHLORIDE SOLN NEBU 3% 4 ML: 3 NEBU SOLN at 00:05

## 2025-06-08 RX ADMIN — SODIUM CHLORIDE, SODIUM GLUCONATE, SODIUM ACETATE, POTASSIUM CHLORIDE, MAGNESIUM CHLORIDE, SODIUM PHOSPHATE, DIBASIC, AND POTASSIUM PHOSPHATE 500 ML: .53; .5; .37; .037; .03; .012; .00082 INJECTION, SOLUTION INTRAVENOUS at 05:06

## 2025-06-08 RX ADMIN — PANTOPRAZOLE SODIUM 40 MG: 40 TABLET, DELAYED RELEASE ORAL at 08:00

## 2025-06-08 RX ADMIN — RANOLAZINE 500 MG: 500 TABLET, FILM COATED, EXTENDED RELEASE ORAL at 17:04

## 2025-06-08 RX ADMIN — THIAMINE HYDROCHLORIDE 500 MG: 100 INJECTION, SOLUTION INTRAMUSCULAR; INTRAVENOUS at 03:06

## 2025-06-08 RX ADMIN — VANCOMYCIN HYDROCHLORIDE 125 MG: 125 CAPSULE ORAL at 05:05

## 2025-06-08 RX ADMIN — METOPROLOL SUCCINATE 100 MG: 100 TABLET, EXTENDED RELEASE ORAL at 08:00

## 2025-06-08 RX ADMIN — VANCOMYCIN HYDROCHLORIDE 125 MG: 125 CAPSULE ORAL at 11:59

## 2025-06-08 RX ADMIN — Medication 6 MG: at 01:45

## 2025-06-08 RX ADMIN — SODIUM CHLORIDE SOLN NEBU 3% 4 ML: 3 NEBU SOLN at 23:15

## 2025-06-08 RX ADMIN — ATORVASTATIN CALCIUM 40 MG: 40 TABLET, FILM COATED ORAL at 08:00

## 2025-06-08 RX ADMIN — INSULIN LISPRO 1 UNITS: 100 INJECTION, SOLUTION INTRAVENOUS; SUBCUTANEOUS at 17:05

## 2025-06-08 NOTE — PLAN OF CARE
Problem: PHYSICAL THERAPY ADULT  Goal: Performs mobility at highest level of function for planned discharge setting.  See evaluation for individualized goals.  Description: Treatment/Interventions: Therapeutic exercise, LE strengthening/ROM, Functional transfer training, Gait training, Bed mobility, Equipment eval/education, Endurance training, Patient/family training, Spoke to nursing          See flowsheet documentation for full assessment, interventions and recommendations.  Note: Prognosis: Good  Problem List: Decreased strength, Impaired balance, Decreased mobility, Decreased cognition, Impaired judgement, Decreased safety awareness  Assessment: Pt is 65 y.o. male seen for PT evaluation s/p admit to [facility] on 6/5/2025 w/ Alcohol withdrawal (HCC). PT consulted to assess pt's functional mobility and d/c needs. Order placed for PT eval and tx.  Co-morbidities affecting patient's physical performance include: ETOH abuse, COPD, recurrent C-diff, falls, afib, CAD, non compliance.  Personal factors affecting patient at time of initial evaluation include: ambulating with assistive device, inability to navigate level surfaces without external assistance, inability to perform dynamic tasks in community, decreased cognition, limited home support, and positive fall history.     Prior to admission, patient was independent with functional mobility with rollator and independent with ADLS.  Upon evaluation: Pt required min assist to walk with a walker with 4L02 and moderate cues for safety.      Please find objective findings from Physical Therapy assessment regarding body systems outlined above with impairments and limitations including weakness, impaired balance, gait deviations, decreased activity tolerance, decreased functional mobility tolerance, decreased safety awareness, impaired judgement, fall risk, and decreased cognition.The Barthel Index was used as a functional outcome tool presenting with a score of  50 today indicating marked limitations of functional mobility and ADLS.  Patient's clinical presentation is currently unstable/unpredictable as seen in patient's presentation of varying levels of cognitive performance, increased fall risk, new onset of impairment of functional mobility, and new onset of weakness. Pt would benefit from continued Physical Therapy treatment to address deficits as defined above and maximize level of functional mobility.     As demonstrated by objective findings, the assigned level of complexity for this evaluation is high.The patient's AM-PAC Basic Mobility Inpatient Short Form Raw Score is 19. A Raw score of greater than 16 suggests the patient may benefit from discharge to home, however pt is not currently able to ambulate on his own and has limited home support.  Pt is at high risk to fall and below his baseline level of function so pt may benefit from post acute rehab services. Of note, pt has been seen in the ED 10 times since the beginning of May 2025.  Please also refer to the recommendation of the Physical Therapist for safe discharge planning.        Rehab Resource Intensity Level, PT: II (Moderate Resource Intensity)    See flowsheet documentation for full assessment.

## 2025-06-08 NOTE — PROGRESS NOTES
Progress Note - Critical Care/ICU   Name: Gregg Partida 65 y.o. male I MRN: [id number]  Unit/Bed#: ICU 03 I Date of Admission: 6/5/2025   Date of Service: 6/8/2025 I Hospital Day: 3       Critical Care Interval Transfer Note:    Brief Hospital Summary:  PMH of alcohol abuse with withdrawal, PAF on Eliquis, CAD s/p CABG, CHF.  Presented to the hospital ED after sustaining a fall. Found to be in rapid Afib with RVR. Pt admitted to Step Down 2 at which point patient went into severe withdrawal. Supplemental oxygen requirements had increased and CXR demonstrated complete opacification of left lung fields. Patient was transferred to ICU service for further management. He was started on aggressive pulmonary hygiene. He was also started on 10mg/kg phenobarb load, but only required 1x dose and load was d/c'd.     Barriers to discharge:   Complete high dose thiamine  Pulmonary hygiene  Consideration for bronch if unable to clear airway   Eliquis has been stopped as patient is high risk with frequent falls. Re-assess risk vs benefit prior to d/c      Consults: IP CONSULT TO PULMONOLOGY  IP CONSULT TO MEDICAL CRITICAL CARE    Patient seen and evaluated by Critical Care today and deemed to be appropriate for transfer to Med Surg with Telemetry. Spoke to Dr. Stockton from [facility], [place] to accept transfer. Critical care can be contacted via SecureChat with any questions or concerns. Please use the Critical Care AP Role in Secure Chat for any provider inquires until the patient is transferred out of the ICU or until tomorrow at 0600.

## 2025-06-08 NOTE — QUICK NOTE
Patient evaluated at bedside, noting baseline tremors.  Noting shallow breathing, denies any acute concerns.

## 2025-06-08 NOTE — PHYSICAL THERAPY NOTE
"       PHYSICAL THERAPY EVALUATION/TREATMENT     06/08/25 1415   PT Last Visit   PT Visit Date 06/08/25   Note Type   Note type Evaluation   Pain Assessment   Pain Assessment Tool 0-10   Pain Score No Pain   Restrictions/Precautions   Other Precautions Contact/isolation;Chair Alarm;Bed Alarm;Fall Risk;O2;Cognitive  (4L02), condom cath, LE wounds   Home Living   Type of Home Apartment   Home Layout One level;Elevator   Home Equipment   (rollator, cane)   Prior Function   Level of [place]   (Pt ambulates with a rollator.  Frequent falls.)   Lives With Alone   Receives Help From   (son lives nearby)   Vocational On disability   General   Additional Pertinent History Pt admitted with fall, ETOH withdrawal.  Pt has had multiple recent admissions, 10 since the beginning of June 2025. Pt seen in ICU.   Cognition   Overall Cognitive Status Impaired   Arousal/Participation Cooperative   Orientation Level Oriented to person;Oriented to place;Oriented to time;Disoriented to situation   Following Commands Follows one step commands with increased time or repetition   Comments Pt has little insight into his deficits.   Subjective   Subjective \"I have to get home to pay my rent\"   RUE Assessment   RUE Assessment WFL   LUE Assessment   LUE Assessment WFL   RLE Assessment   RLE Assessment WFL   LLE Assessment   LLE Assessment WFL   Bed Mobility   Supine to Sit 5  Supervision   Additional items   (verbal cues for safety, pt was climbing OOB with all of his wires and lines still attached.)   Transfers   Sit to Stand 4  Minimal assistance   Stand to Sit 4  Minimal assistance   Stand pivot 4  Minimal assistance   Ambulation/Elevation   Gait pattern   (very wide BIN, with feet outside the width of the walker, very flexed posture, moderate verbal cues for safety.)   Gait Assistance 4  Minimal assist   Assistive Device Rolling walker   Distance 50 feet   Balance   Static Sitting Fair +   Static Standing Fair   Ambulatory Fair - "   Activity Tolerance   Activity Tolerance Patient limited by fatigue   Assessment   Prognosis Good   Problem List Decreased strength;Impaired balance;Decreased mobility;Decreased cognition;Impaired judgement;Decreased safety awareness   Assessment Pt is 65 y.o. male seen for PT evaluation s/p admit to [facility] on 6/5/2025 w/ Alcohol withdrawal (HCC). PT consulted to assess pt's functional mobility and d/c needs. Order placed for PT eval and tx.  Co-morbidities affecting patient's physical performance include: ETOH abuse, COPD, recurrent C-diff, falls, afib, CAD, non compliance.  Personal factors affecting patient at time of initial evaluation include: ambulating with assistive device, inability to navigate level surfaces without external assistance, inability to perform dynamic tasks in community, decreased cognition, limited home support, and positive fall history.         Prior to admission, patient was independent with functional mobility with rollator and independent with ADLS.  Upon evaluation: Pt required min assist to walk with a walker with 4L02 and moderate cues for safety.          Please find objective findings from Physical Therapy assessment regarding body systems outlined above with impairments and limitations including weakness, impaired balance, gait deviations, decreased activity tolerance, decreased functional mobility tolerance, decreased safety awareness, impaired judgement, fall risk, and decreased cognition.The Barthel Index was used as a functional outcome tool presenting with a score of 50 today indicating marked limitations of functional mobility and ADLS.  Patient's clinical presentation is currently unstable/unpredictable as seen in patient's presentation of varying levels of cognitive performance, increased fall risk, new onset of impairment of functional mobility, and new onset of weakness. Pt would benefit from continued Physical Therapy treatment to address deficits as defined  "above and maximize level of functional mobility.     As demonstrated by objective findings, the assigned level of complexity for this evaluation is high.    The patient's AM-PAC Basic Mobility Inpatient Short Form Raw Score is 19. A Raw score of greater than 16 suggests the patient may benefit from discharge to home, however pt is not currently able to ambulate on his own and has limited home support.  Pt is at high risk to fall and below his baseline level of function so pt may benefit from post acute rehab services. Of note, pt has been seen in the ED 10 times since the beginning of May 2025.  Please also refer to the recommendation of the Physical Therapist for safe discharge planning.   Goals   Patient Goals \"go home so I can pay my  bills\"   STG Expiration Date 06/15/25   Short Term Goal #1 1. independent transfers,   2. supervision ambulation with a walker 100 feet with a steady gait,   3. no falls   LTG Expiration Date 06/22/25   Long Term Goal #1 1. modified independent ambulation with a walker 300 feet so pt can ambulate to the bus stop so he can go shopping at [employer]   Plan   Treatment/Interventions Therapeutic exercise;LE strengthening/ROM;Functional transfer training;Gait training;Bed mobility;Equipment eval/education;Endurance training;Patient/family training;Spoke to nursing   PT Frequency 3-5x/wk   Discharge Recommendation   Rehab Resource Intensity Level, PT II (Moderate Resource Intensity)   AM-PAC Basic Mobility Inpatient   Turning in Flat Bed Without Bedrails 4   Lying on Back to Sitting on Edge of Flat Bed Without Bedrails 4   Moving Bed to Chair 3   Standing Up From Chair Using Arms 3   Walk in Room 3   Climb 3-5 Stairs With Railing 2   Basic Mobility Inpatient Raw Score 19   Basic Mobility Standardized Score 42.48   [facility] Highest Level Of Mobility   -HLM Goal 6: Walk 10 steps or more   -HLM Achieved 7: Walk 25 feet or more   Barthel Index   Feeding 10   Bathing 0   Grooming Score 0 " "  Dressing Score 5   Bladder Score 5   Bowels Score 5   Toilet Use Score 5   Transfers (Bed/Chair) Score 10   Mobility (Level Surface) Score 10   Stairs Score 0   Barthel Index Score 50   Additional Treatment Session   Start Time 1350   End Time 1415   Treatment Assessment S:  \"I have to go to the bathroom\"    O:  Pt ambulated to the bathroom with min assist with a rolling walker and 4L02 x 15 feet. Moderate verbal cues for safety needed.  Pt wiped with min assist. Pt then agreed to ambulate in the hallway x 75 feet with the walker/4L02 with min assist and  mod verbal cues  for safety and direction.  HR and Sp02 were stable with activity.  A:  Pt has limited insight into his deficits.  Pt has poor safety awareness and is at risk to fall.   P: Continue PT to improve pt's independence with functional mobility.   End of Consult   Patient Position at End of Consult All needs within reach;Bed/Chair alarm activated;Bedside chair   Licensure   [place] License Number  Cassie Menjackie PT [id number]     "

## 2025-06-09 ENCOUNTER — APPOINTMENT (INPATIENT)
Dept: RADIOLOGY | Facility: HOSPITAL | Age: 65
DRG: 308 | End: 2025-06-09
Payer: COMMERCIAL

## 2025-06-09 PROBLEM — F10.939 ALCOHOL WITHDRAWAL (HCC): Status: RESOLVED | Noted: 2024-03-25 | Resolved: 2025-06-09

## 2025-06-09 PROBLEM — F10.929 ALCOHOL INTOXICATION (HCC): Status: RESOLVED | Noted: 2018-10-09 | Resolved: 2025-06-09

## 2025-06-09 LAB
ALBUMIN SERPL BCG-MCNC: 3.4 G/DL (ref 3.5–5)
ALP SERPL-CCNC: 189 U/L (ref 34–104)
ALT SERPL W P-5'-P-CCNC: 75 U/L (ref 7–52)
ANION GAP SERPL CALCULATED.3IONS-SCNC: 9 MMOL/L (ref 4–13)
AST SERPL W P-5'-P-CCNC: 158 U/L (ref 13–39)
BASOPHILS # BLD AUTO: 0.03 THOUSANDS/ÂΜL (ref 0–0.1)
BASOPHILS NFR BLD AUTO: 1 % (ref 0–1)
BILIRUB SERPL-MCNC: 1.24 MG/DL (ref 0.2–1)
BUN SERPL-MCNC: 17 MG/DL (ref 5–25)
CALCIUM ALBUM COR SERPL-MCNC: 8.5 MG/DL (ref 8.3–10.1)
CALCIUM SERPL-MCNC: 8 MG/DL (ref 8.4–10.2)
CHLORIDE SERPL-SCNC: 97 MMOL/L (ref 96–108)
CO2 SERPL-SCNC: 24 MMOL/L (ref 21–32)
CREAT SERPL-MCNC: 1.24 MG/DL (ref 0.6–1.3)
EOSINOPHIL # BLD AUTO: 0.04 THOUSAND/ÂΜL (ref 0–0.61)
EOSINOPHIL NFR BLD AUTO: 1 % (ref 0–6)
ERYTHROCYTE [DISTWIDTH] IN BLOOD BY AUTOMATED COUNT: 17.3 % (ref 11.6–15.1)
GFR SERPL CREATININE-BSD FRML MDRD: 61 ML/MIN/1.73SQ M
GLUCOSE SERPL-MCNC: 121 MG/DL (ref 65–140)
GLUCOSE SERPL-MCNC: 124 MG/DL (ref 65–140)
GLUCOSE SERPL-MCNC: 127 MG/DL (ref 65–140)
GLUCOSE SERPL-MCNC: 130 MG/DL (ref 65–140)
GLUCOSE SERPL-MCNC: 148 MG/DL (ref 65–140)
HCT VFR BLD AUTO: 30.5 % (ref 36.5–49.3)
HGB BLD-MCNC: 9 G/DL (ref 12–17)
IMM GRANULOCYTES # BLD AUTO: 0.02 THOUSAND/UL (ref 0–0.2)
IMM GRANULOCYTES NFR BLD AUTO: 1 % (ref 0–2)
LYMPHOCYTES # BLD AUTO: 0.83 THOUSANDS/ÂΜL (ref 0.6–4.47)
LYMPHOCYTES NFR BLD AUTO: 19 % (ref 14–44)
MAGNESIUM SERPL-MCNC: 1.8 MG/DL (ref 1.9–2.7)
MCH RBC QN AUTO: 31.4 PG (ref 26.8–34.3)
MCHC RBC AUTO-ENTMCNC: 29.5 G/DL (ref 31.4–37.4)
MCV RBC AUTO: 106 FL (ref 82–98)
MONOCYTES # BLD AUTO: 0.32 THOUSAND/ÂΜL (ref 0.17–1.22)
MONOCYTES NFR BLD AUTO: 7 % (ref 4–12)
NEUTROPHILS # BLD AUTO: 3.11 THOUSANDS/ÂΜL (ref 1.85–7.62)
NEUTS SEG NFR BLD AUTO: 71 % (ref 43–75)
NRBC BLD AUTO-RTO: 0 /100 WBCS
PLATELET # BLD AUTO: 56 THOUSANDS/UL (ref 149–390)
PMV BLD AUTO: 12 FL (ref 8.9–12.7)
POTASSIUM SERPL-SCNC: 4.7 MMOL/L (ref 3.5–5.3)
PROT SERPL-MCNC: 6.7 G/DL (ref 6.4–8.4)
RBC # BLD AUTO: 2.87 MILLION/UL (ref 3.88–5.62)
SODIUM SERPL-SCNC: 130 MMOL/L (ref 135–147)
WBC # BLD AUTO: 4.35 THOUSAND/UL (ref 4.31–10.16)

## 2025-06-09 PROCEDURE — 94669 MECHANICAL CHEST WALL OSCILL: CPT

## 2025-06-09 PROCEDURE — 94640 AIRWAY INHALATION TREATMENT: CPT

## 2025-06-09 PROCEDURE — 71045 X-RAY EXAM CHEST 1 VIEW: CPT

## 2025-06-09 PROCEDURE — 97167 OT EVAL HIGH COMPLEX 60 MIN: CPT

## 2025-06-09 PROCEDURE — 97535 SELF CARE MNGMENT TRAINING: CPT

## 2025-06-09 PROCEDURE — 85025 COMPLETE CBC W/AUTO DIFF WBC: CPT

## 2025-06-09 PROCEDURE — 99232 SBSQ HOSP IP/OBS MODERATE 35: CPT | Performed by: STUDENT IN AN ORGANIZED HEALTH CARE EDUCATION/TRAINING PROGRAM

## 2025-06-09 PROCEDURE — 82948 REAGENT STRIP/BLOOD GLUCOSE: CPT

## 2025-06-09 PROCEDURE — 94668 MNPJ CHEST WALL SBSQ: CPT

## 2025-06-09 PROCEDURE — 83735 ASSAY OF MAGNESIUM: CPT

## 2025-06-09 PROCEDURE — 94760 N-INVAS EAR/PLS OXIMETRY 1: CPT

## 2025-06-09 PROCEDURE — 80053 COMPREHEN METABOLIC PANEL: CPT

## 2025-06-09 PROCEDURE — 99233 SBSQ HOSP IP/OBS HIGH 50: CPT | Performed by: INTERNAL MEDICINE

## 2025-06-09 RX ORDER — MAGNESIUM SULFATE HEPTAHYDRATE 40 MG/ML
2 INJECTION, SOLUTION INTRAVENOUS ONCE
Status: COMPLETED | OUTPATIENT
Start: 2025-06-09 | End: 2025-06-09

## 2025-06-09 RX ORDER — SODIUM CHLORIDE 9 MG/ML
75 INJECTION, SOLUTION INTRAVENOUS CONTINUOUS
Status: DISCONTINUED | OUTPATIENT
Start: 2025-06-09 | End: 2025-06-10

## 2025-06-09 RX ADMIN — ATORVASTATIN CALCIUM 40 MG: 40 TABLET, FILM COATED ORAL at 08:08

## 2025-06-09 RX ADMIN — THIAMINE HYDROCHLORIDE 500 MG: 100 INJECTION, SOLUTION INTRAMUSCULAR; INTRAVENOUS at 02:19

## 2025-06-09 RX ADMIN — FLUTICASONE FUROATE AND VILANTEROL TRIFENATATE 1 PUFF: 200; 25 POWDER RESPIRATORY (INHALATION) at 08:09

## 2025-06-09 RX ADMIN — MAGNESIUM SULFATE HEPTAHYDRATE 2 G: 40 INJECTION, SOLUTION INTRAVENOUS at 07:46

## 2025-06-09 RX ADMIN — VANCOMYCIN HYDROCHLORIDE 125 MG: 125 CAPSULE ORAL at 17:14

## 2025-06-09 RX ADMIN — Medication 1 TABLET: at 08:08

## 2025-06-09 RX ADMIN — DILTIAZEM HYDROCHLORIDE 120 MG: 60 CAPSULE, EXTENDED RELEASE ORAL at 17:14

## 2025-06-09 RX ADMIN — GUAIFENESIN 600 MG: 600 TABLET, EXTENDED RELEASE ORAL at 08:08

## 2025-06-09 RX ADMIN — VANCOMYCIN HYDROCHLORIDE 125 MG: 125 CAPSULE ORAL at 05:14

## 2025-06-09 RX ADMIN — ASPIRIN 81 MG: 81 TABLET, CHEWABLE ORAL at 08:07

## 2025-06-09 RX ADMIN — FERROUS SULFATE TAB 325 MG (65 MG ELEMENTAL FE) 325 MG: 325 (65 FE) TAB at 08:07

## 2025-06-09 RX ADMIN — THIAMINE HYDROCHLORIDE 500 MG: 100 INJECTION, SOLUTION INTRAMUSCULAR; INTRAVENOUS at 11:30

## 2025-06-09 RX ADMIN — NICOTINE 1 PATCH: 21 PATCH, EXTENDED RELEASE TRANSDERMAL at 00:35

## 2025-06-09 RX ADMIN — RANOLAZINE 500 MG: 500 TABLET, FILM COATED, EXTENDED RELEASE ORAL at 08:08

## 2025-06-09 RX ADMIN — SODIUM CHLORIDE SOLN NEBU 3% 4 ML: 3 NEBU SOLN at 23:20

## 2025-06-09 RX ADMIN — ALBUTEROL SULFATE 2.5 MG: 2.5 SOLUTION RESPIRATORY (INHALATION) at 23:07

## 2025-06-09 RX ADMIN — APIXABAN 5 MG: 5 TABLET, FILM COATED ORAL at 17:14

## 2025-06-09 RX ADMIN — Medication 250 MG: at 17:14

## 2025-06-09 RX ADMIN — VANCOMYCIN HYDROCHLORIDE 125 MG: 125 CAPSULE ORAL at 11:30

## 2025-06-09 RX ADMIN — ALBUTEROL SULFATE 2.5 MG: 2.5 SOLUTION RESPIRATORY (INHALATION) at 15:04

## 2025-06-09 RX ADMIN — SODIUM CHLORIDE SOLN NEBU 3% 4 ML: 3 NEBU SOLN at 15:04

## 2025-06-09 RX ADMIN — PANTOPRAZOLE SODIUM 40 MG: 40 TABLET, DELAYED RELEASE ORAL at 08:08

## 2025-06-09 RX ADMIN — METOPROLOL SUCCINATE 100 MG: 100 TABLET, EXTENDED RELEASE ORAL at 17:14

## 2025-06-09 RX ADMIN — SODIUM CHLORIDE SOLN NEBU 3% 4 ML: 3 NEBU SOLN at 07:06

## 2025-06-09 RX ADMIN — METOPROLOL SUCCINATE 100 MG: 100 TABLET, EXTENDED RELEASE ORAL at 08:08

## 2025-06-09 RX ADMIN — SODIUM CHLORIDE 75 ML/HR: 0.9 INJECTION, SOLUTION INTRAVENOUS at 11:31

## 2025-06-09 RX ADMIN — RANOLAZINE 500 MG: 500 TABLET, FILM COATED, EXTENDED RELEASE ORAL at 17:14

## 2025-06-09 RX ADMIN — ALBUTEROL SULFATE 2.5 MG: 2.5 SOLUTION RESPIRATORY (INHALATION) at 07:06

## 2025-06-09 RX ADMIN — GUAIFENESIN 600 MG: 600 TABLET, EXTENDED RELEASE ORAL at 20:47

## 2025-06-09 RX ADMIN — TAMSULOSIN HYDROCHLORIDE 0.4 MG: 0.4 CAPSULE ORAL at 17:14

## 2025-06-09 RX ADMIN — DILTIAZEM HYDROCHLORIDE 120 MG: 60 CAPSULE, EXTENDED RELEASE ORAL at 08:09

## 2025-06-09 RX ADMIN — THIAMINE HYDROCHLORIDE 500 MG: 100 INJECTION, SOLUTION INTRAMUSCULAR; INTRAVENOUS at 20:24

## 2025-06-09 RX ADMIN — VANCOMYCIN HYDROCHLORIDE 125 MG: 125 CAPSULE ORAL at 00:35

## 2025-06-09 RX ADMIN — Medication 250 MG: at 08:07

## 2025-06-09 NOTE — PROGRESS NOTES
Progress Note - Family Medicine   Name: Gregg Partida 65 y.o. male I MRN: [id number]  Unit/Bed#: ICU 03 I Date of Admission: 6/5/2025   Date of Service: 6/9/2025 I Hospital Day: 4     Assessment & Plan  Acute respiratory failure with hypoxia (HCC)  Patient with recent fall, alcohol withdrawal and aspiration risk presented with acute hypoxia requiring 12 L of oxygen, weaned down during course of admission. Patient is otherwise afebrile during course of admission. Likely contributing factors recent alcohol abuse, poor p.o. intake, high aspiration risk.    6/8 CXR: Partial reexpansion of the left lung with probable small left pleural effusion. Persistent groundglass opacity in the right lung.  6/9 CXR: Significant interval improvement of pulmonary alveolar edema.    Continue with sodium chloride nebs every 8 hours  Continue levalbuterol nebs every 8 hours  Use Acapella device and incentive spirometry aggressively  Chest physiotherapy and repositioning  Consider CT chest if no improvement or diagnosis unclear  Pulmonology consulted, appreciate recommendations  Goal saturation >89%  Repeat CXR tomorrow, it he continue to improve no bronchoscopy  Alcohol withdrawal (HCC)  Last drink morning of ED visit. Does not use home medication Naltrexone 50mg daily. Ground level fall on afternoon of initial presentation.    POA CIWA 9, CIWA is trending down during course of admission.    -Initial EtOH: 346  -CT head: No acute intracranial abnormality  -CT spine: No cervical spine fracture or traumatic malalignment    6/7 : Worsening CIWA score, last CIWA 32 at 10 AM. Transferred to ICU for phenobarbital.    Continue CIWA protocol  Thiamine, folic acid, multivitamin  Seizure, fall, aspiration precautions  Monitor telemetry  Hold home Naltrexone  PT/OT  Atrial fibrillation with RVR (HCC)  Presented to ED after alcohol intoxication and fall. EKG showing A-fib with RVR. Suspect secondary to alcohol use, or not using medications. Home  meds: Eliquis 5mg BID, metoprolol 100mg BID, Cardizem 120mg BID    -CHADVASC score 3  -EKG A-fib w/RVR, rate 166  -s/p Cardizem drip    ED course: IV cardizem 10mgx1, D5NS, cardizem drip, IV mag 2gx1    Continue with metoprolol 100 mg p.o. twice daily, Cardizem 120 mg p.o. twice daily and Eliquis 5 mg twice daily. Will continue discussion with patient regarding risks and benefits of Eliquis.   Telemetry - currently rate controlled.  Opacity of lung on imaging study  6/7 CXR: left basilar opacity   6/8 CXR: Partial reexpansion of the left lung with probable small left pleural effusion. Persistent groundglass opacity in the right lung.  6/9 CXR: Significant interval improvement of pulmonary alveolar edema    Continue breathing treatments  Elevated troponin  Troponin 70>173>192.    Non-MI troponin elevation in setting of A-fib with RVR.  History of prostate cancer  Chronic, stable. S/p resection 2021.    Home meds Flomax 0.4mg    Continue home med  Recurrent Clostridioides difficile diarrhea  Hx recurrent C diff infection. Most recently diagnosed 5/8 hospitalization. Started on PO vancomycin 125 mg q6h 5/8. Did not take medications last few days prior to admission.    Continue with PO vancomycin  Contact precautions  Probiotics  Fall  Ground level fall on afternoon of ED visit. Denies preceding headache, dizziness, lightheadedness  Multiple falls due to drinking alcohol in the past.    CT head and spine unremarkable.    Fall precautions  PT/OT  Type 2 diabetes mellitus, without long-term current use of insulin ([facility])  Lab Results   Component Value Date    HGBA1C 5.2 05/05/2025       Recent Labs     06/08/25  2109 06/09/25  0636 06/09/25  1049 06/09/25  1600   POCGLU 175* 130 121 124       Blood Sugar Average: Last 72 hrs:  (P) [id number]    Chronic, unstable.  Home meds: Gabapentin 300mg TID, Metformin 500mg daily  Hypoglycemic initially likely secondary to poor PO intake    Hold metformin  Continue  gabapentin  ISS  Hypoglycemia protocol  Carb controlled diet  COPD (chronic obstructive pulmonary disease) (HCC)  Chronic, stable.    Home meds: Breo-Elipta daily    Continue breo and nebulizers PRN  Electrolyte abnormality  Initial labs notable for  Mg 1.2: repleted w/ IV mag 2g x1 while in ED.     Monitor and replete as indicated  CAD (coronary artery disease)  CAD s/p CABG in 2004.   Home medications: ASA 81 mg, Ranexa 500 mg BID,   No chest pain endorsed at time of initial assessment.     Aspirin daily  Continue Ranexa  Thrombocytopenia (HCC)  Noted history of thrombocytopenia.   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  No active signs of bleeding at time of examination.     Continue anticoagulation with Eliquis in setting of A-fib w/ RVR  Continue aspirin  Monitor for signs of bleeding  Transaminitis  Initial labs notable for: , ALT 65,   Suspect secondary to acute on chronic alcohol use.     Improving  6/7 : AST 61, ALT 43,     Continue with Lipitor  Continue to monitor   Chronic heart failure with preserved ejection fraction (HCC)  Chronic, stable. Patient appears euvolemic on initial assessment.     -Initial labs notable for:     Monitor fluid status    Wt Readings from Last 3 Encounters:   06/08/25 81.8 kg (180 lb 5.4 oz)   05/28/25 76 kg (167 lb 8.8 oz)   05/13/25 79.1 kg (174 lb 4.8 oz)     Acute metabolic encephalopathy      VTE Pharmacologic Prophylaxis: VTE Score: 4 Moderate Risk (Score 3-4) - Pharmacological DVT Prophylaxis Ordered: apixaban (Eliquis).    Mobility:   Basic Mobility Inpatient Raw Score: 19  JH-HLM Goal: 6: Walk 10 steps or more  JH-HLM Achieved: 6: Walk 10 steps or more  JH-HLM Goal achieved. Continue to encourage appropriate mobility.    Patient Centered Rounds: I performed bedside rounds with nursing staff today.   Discussions with Specialists or Other Care Team Provider: Pulmonology    Education and Discussions with Family / Patient:  Patient declined call to .     Current Length of Stay: 4 day(s)  Current Patient Status: Inpatient   Certification Statement: The patient will continue to require additional inpatient hospital stay due to bronchoscopy procedure.  Discharge Plan: Anticipate discharge in 24-48 hrs to home.    Code Status: Level 1 - Full Code    Subjective   Patient was resting in bed and states he feels good enough to go home now.  He feels like he is locked up and not enjoying being in the hospital.  He is not oriented to time at time of assessment. Denies headache, chest pain, shortness of breath, abdominal pain, urinary symptoms, N/V.  Discussed importance of breathing treatments and follow-up.      Objective :  Temp:  [96.6 °F (35.9 °C)-97.7 °F (36.5 °C)] 97.7 °F (36.5 °C)  HR:  [58-80] 64  BP: ()/(54-83) 117/75  Resp:  [16-23] 21  SpO2:  [92 %-100 %] 95 %  O2 Device: Mid flow nasal cannula  Nasal Cannula O2 Flow Rate (L/min):  [4 L/min] 4 L/min    Body mass index is 23.15 kg/m².     Input and Output Summary (last 24 hours):     Intake/Output Summary (Last 24 hours) at 6/9/2025 1628  Last data filed at 6/9/2025 1540  Gross per 24 hour   Intake 400 ml   Output 500 ml   Net -100 ml       Physical Exam  Vitals reviewed.   Constitutional:       General: He is not in acute distress.     Appearance: Normal appearance.   HENT:      Head: Normocephalic and atraumatic.      Right Ear: External ear normal.      Left Ear: External ear normal.      Nose: Nose normal.      Mouth/Throat:      Mouth: Mucous membranes are moist.      Pharynx: Oropharynx is clear.     Eyes:      Extraocular Movements: Extraocular movements intact.       Cardiovascular:      Rate and Rhythm: Normal rate and regular rhythm.      Pulses: Normal pulses.      Heart sounds: Normal heart sounds.   Pulmonary:      Breath sounds: Wheezing (b/l) present.      Comments: Decreased air movement b/l.  Abdominal:      General: Abdomen is flat. There is no  distension.      Palpations: Abdomen is soft.      Tenderness: There is no abdominal tenderness. There is no guarding.     Musculoskeletal:         General: No tenderness. Normal range of motion.      Right lower leg: No edema.      Left lower leg: No edema.     Skin:     General: Skin is warm and dry.      Capillary Refill: Capillary refill takes less than 2 seconds.     Neurological:      General: No focal deficit present.      Mental Status: He is alert.      Comments: Oriented to self and place only.   Psychiatric:         Mood and Affect: Mood normal.         Behavior: Behavior normal.           Telemetry:  Telemetry Orders (From admission, onward)               24 Hour Telemetry Monitoring  Continuous x 24 Hours (Telem)        Expiring   Question:  Reason for 24 Hour Telemetry  Answer:  Alcohol withdrawal and CIWA >7, electrolyte abnormalities, abnormal ECG and/or heart disease                     Telemetry Reviewed: Atrial fibrillation. HR averaging 70-90s  Indication for Continued Telemetry Use: Arrthymias requiring medical therapy               Lab Results: I have reviewed the following results:   Results from last 7 days   Lab Units 06/09/25  0510   WBC Thousand/uL 4.35   HEMOGLOBIN g/dL 9.0*   HEMATOCRIT % 30.5*   PLATELETS Thousands/uL 56*   SEGS PCT % 71   LYMPHO PCT % 19   MONO PCT % 7   EOS PCT % 1     Results from last 7 days   Lab Units 06/09/25  0510   SODIUM mmol/L 130*   POTASSIUM mmol/L 4.7   CHLORIDE mmol/L 97   CO2 mmol/L 24   BUN mg/dL 17   CREATININE mg/dL 1.24   ANION GAP mmol/L 9   CALCIUM mg/dL 8.0*   ALBUMIN g/dL 3.4*   TOTAL BILIRUBIN mg/dL 1.24*   ALK PHOS U/L 189*   ALT U/L 75*   AST U/L 158*   GLUCOSE RANDOM mg/dL 127         Results from last 7 days   Lab Units 06/09/25  1600 06/09/25  1049 06/09/25  0636 06/08/25  2109 06/08/25  1623 06/08/25  1113 06/08/25  0708 06/08/25  0000 06/07/25  1734 06/07/25  1141 06/07/25  0714 06/07/25  0039   POC GLUCOSE mg/dl 124 121 130 175* 192* 113  122 104 126 130 139 151*               Recent Cultures (last 7 days):         Imaging Results Review: I reviewed radiology reports from this admission including: chest xray, CT head, and CT spine.  Other Study Results Review: EKG was reviewed.     Last 24 Hours Medication List:     Current Facility-Administered Medications:     albuterol inhalation solution 2.5 mg, Q8H **AND** sodium chloride 3 % inhalation solution 4 mL, Q8H    apixaban (ELIQUIS) tablet 5 mg, BID    aspirin chewable tablet 81 mg, Daily    atorvastatin (LIPITOR) tablet 40 mg, Daily    diltiazem (CARDIZEM SR) 12 hr capsule 120 mg, BID    ferrous sulfate tablet 325 mg, Daily With Breakfast    fluticasone-vilanterol 200-25 mcg/actuation 1 puff, Daily    guaiFENesin (MUCINEX) 12 hr tablet 600 mg, Q12H SEDA    insulin lispro (HumALOG/ADMELOG) 100 units/mL subcutaneous injection 1-5 Units, TID AC **AND** Fingerstick Glucose (POCT), TID AC    insulin lispro (HumALOG/ADMELOG) 100 units/mL subcutaneous injection 1-5 Units, HS    melatonin tablet 6 mg, HS PRN    metoprolol succinate (TOPROL-XL) 24 hr tablet 100 mg, BID    multivitamin-minerals (CENTRUM) tablet 1 tablet, Daily    nicotine (NICODERM CQ) 21 mg/24 hr TD 24 hr patch 1 patch, Q24H    pantoprazole (PROTONIX) EC tablet 40 mg, Daily    ranolazine (RANEXA) 12 hr tablet 500 mg, BID    saccharomyces boulardii (FLORASTOR) capsule 250 mg, BID    sodium chloride 0.9 % infusion, Continuous, Last Rate: 75 mL/hr (06/09/25 1131)    tamsulosin (FLOMAX) capsule 0.4 mg, Daily With Dinner    thiamine (VITAMIN B1) 500 mg in dextrose 5 % 100 mL IVPB, Q8H, Last Rate: Stopped (06/09/25 1200)    vancomycin (VANCOCIN) capsule 125 mg, Q6H SEDA    Administrative Statements   Today, Patient Was Seen By: Ishmael Presley, DO  I have spent a total time of >30 minutes in caring for this patient on the day of the visit/encounter including Instructions for management, Patient and family education, Importance of tx compliance, Counseling /  Coordination of care, Documenting in the medical record, Reviewing/placing orders in the medical record (including tests, medications, and/or procedures), Obtaining or reviewing history  , and Communicating with other healthcare professionals .    **Please Note: This note may have been constructed using a voice recognition system.**

## 2025-06-09 NOTE — ASSESSMENT & PLAN NOTE
Last drink morning of ED visit. Does not use home medication Naltrexone 50mg daily. Ground level fall on afternoon of initial presentation.    POA CIWA 9, CIWA is trending down during course of admission.    -Initial EtOH: 346  -CT head: No acute intracranial abnormality  -CT spine: No cervical spine fracture or traumatic malalignment    6/7 : Worsening CIWA score, last CIWA 32 at 10 AM. Transferred to ICU for phenobarbital.    Continue CIWA protocol  Thiamine, folic acid, multivitamin  Seizure, fall, aspiration precautions  Monitor telemetry  Hold home Naltrexone  PT/OT

## 2025-06-09 NOTE — ASSESSMENT & PLAN NOTE
Lab Results   Component Value Date    HGBA1C 5.2 05/05/2025       Recent Labs     06/08/25  1623 06/08/25  2109 06/09/25  0636 06/09/25  1049   POCGLU 192* 175* 130 121       Blood Sugar Average: Last 72 hrs:  (P) [id number]

## 2025-06-09 NOTE — ASSESSMENT & PLAN NOTE
Chronic, stable. Patient appears euvolemic on initial assessment.     -Initial labs notable for:     Monitor fluid status    Wt Readings from Last 3 Encounters:   06/08/25 81.8 kg (180 lb 5.4 oz)   05/28/25 76 kg (167 lb 8.8 oz)   05/13/25 79.1 kg (174 lb 4.8 oz)

## 2025-06-09 NOTE — ASSESSMENT & PLAN NOTE
Wt Readings from Last 3 Encounters:   06/08/25 81.8 kg (180 lb 5.4 oz)   05/28/25 76 kg (167 lb 8.8 oz)   05/13/25 79.1 kg (174 lb 4.8 oz)

## 2025-06-09 NOTE — PROGRESS NOTES
"Progress Note - Pulmonology   Name: Gregg Partida 65 y.o. male I MRN: [id number]  Unit/Bed#: ICU 03 I Date of Admission: 6/5/2025   Date of Service: 6/9/2025 I Hospital Day: 4    Assessment & Plan  Acute respiratory failure with hypoxia (HCC)  Titrate oxygen to maintain POX > or = 89%.   Activity as tolerated.  Ambulatory POX prior to D/C.  COPD (chronic obstructive pulmonary disease) (HCC)  Emphysema noted on imaging.  Currently on Breo and albuterol.  Opacity of lung on imaging study  Left basilar opacity on CXR progressed to complete opacification of the left hemithorax with improvement over the last 48 hours.  NPO after midnight tonight and repeat CXR tomorrow. Could consider bronchoscopy if fails to improve.  Continue albuterol/hypertonic saline/vest therapy.  Presumed due to mucus plugging/aspiration.    D/W primary team.    24 Hour Events : \"Fine\"  Subjective : Gregg is resting in bed. He reports he feels fine. He does not feel as though he has mucus and does not feel SOB. He denies any complaints at present and is adamant about leaving the hospital tomorrow.    Objective :  Temp:  [96.6 °F (35.9 °C)-97.7 °F (36.5 °C)] 97.7 °F (36.5 °C)  HR:  [58-91] 68  BP: ()/(54-86) 111/57  Resp:  [16-25] 20  SpO2:  [92 %-100 %] 99 %  O2 Device: Nasal cannula  Nasal Cannula O2 Flow Rate (L/min):  [4 L/min] 4 L/min    Physical Exam  Vitals reviewed.   Constitutional:       General: He is not in acute distress.     Appearance: He is well-developed. He is not toxic-appearing or diaphoretic.   HENT:      Head: Normocephalic and atraumatic.     Eyes:      General: No scleral icterus.    Neck:      Trachea: No tracheal deviation.     Cardiovascular:      Rate and Rhythm: Normal rate and regular rhythm.      Heart sounds: S1 normal and S2 normal. No murmur heard.     No friction rub. No gallop.   Pulmonary:      Effort: Pulmonary effort is normal. No tachypnea, accessory muscle usage or respiratory distress.      Breath " sounds: Normal breath sounds. No stridor. No decreased breath sounds, wheezing, rhonchi or rales.   Chest:      Chest wall: No tenderness.   Abdominal:      General: Bowel sounds are normal. There is no distension.      Palpations: Abdomen is soft.      Tenderness: There is no abdominal tenderness.     Musculoskeletal:         General: No tenderness.      Cervical back: Neck supple.      Right lower leg: No edema.      Left lower leg: No edema.     Skin:     General: Skin is warm and dry.      Findings: No rash.     Neurological:      Mental Status: He is alert and oriented to person, place, and time.      GCS: GCS eye subscore is 4. GCS verbal subscore is 5. GCS motor subscore is 6.     Psychiatric:         Speech: Speech normal.         Behavior: Behavior normal. Behavior is cooperative.         Lab Results: I have reviewed the following results:   .     06/09/25  0510   WBC 4.35   HGB 9.0*   HCT 30.5*   PLT 56*   SODIUM 130*   K 4.7   CL 97   CO2 24   BUN 17   CREATININE 1.24   GLUC 127   MG 1.8*   *   ALT 75*   ALB 3.4*   TBILI 1.24*   ALKPHOS 189*

## 2025-06-09 NOTE — OCCUPATIONAL THERAPY NOTE
"  Occupational Therapy Evaluation/Treatment       06/09/25 1043   OT Last Visit   OT Visit Date 06/09/25   Note Type   Note type Evaluation   Pain Assessment   Pain Assessment Tool 0-10   Pain Score 5   Pain Location/Orientation Location: Head   Restrictions/Precautions   Other Precautions Contact/isolation;Chair Alarm;Bed Alarm;Fall Risk;O2;Cognitive   Home Living   Type of Home Apartment   Home Layout One level;Elevator   Bathroom Shower/Tub Tub/shower unit   Bathroom Toilet Standard   Bathroom Equipment Grab bars in shower;Grab bars around toilet   Home Equipment Other (Comment);Cane  (rollator)   Prior Function   Level of [place] Independent with ADLs;Independent with functional mobility;Needs assistance with IADLS   Lives With Alone   Receives Help From   (son lives nearby)   IADLs Independent with meal prep;Independent with medication management;Family/Friend/Other provides transportation   Falls in the last 6 months >10   Comments pt with frequent falls from ETOH abuse   General   Additional Pertinent History Pt admitted with fall, ETOH withdrawal, found to have acute respiratory failure with hypoxia. Pt has had multiple recent admissions   Subjective   Subjective \"I need a toothbrush.\"   ADL   Eating Assistance 6  Modified independent   Grooming Assistance 5  Supervision/Setup   UB Bathing Assistance 4  Minimal Assistance   LB Bathing Assistance 3  Moderate Assistance   UB Dressing Assistance 4  Minimal Assistance   LB Dressing Assistance 3  Moderate Assistance   Toileting Assistance  4  Minimal Assistance   Bed Mobility   Supine to Sit 4  Minimal assistance   Additional items Assist x 1;Verbal cues;Increased time required   Sit to Supine Unable to assess   Additional Comments transferred to bedside chair   Transfers   Sit to Stand 4  Minimal assistance   Additional items Assist x 1;Verbal cues;Increased time required   Stand to Sit 4  Minimal assistance   Additional items Assist x 1;Verbal " cues;Increased time required   Stand pivot 4  Minimal assistance   Additional items Assist x 1;Verbal cues;Increased time required  (bed to chair with RW)   Balance   Static Sitting Fair +   Dynamic Sitting Fair   Static Standing Fair   Dynamic Standing Fair -   Activity Tolerance   Activity Tolerance Patient limited by fatigue   RUE Assessment   RUE Assessment   (at least 2+/5 MMT as seen during fxl assessment)   LUE Assessment   LUE Assessment   (at least 2+/5 MMT as seen during fxl assessment)   Cognition   Overall Cognitive Status Impaired   Arousal/Participation Responsive;Cooperative   Attention Attends with cues to redirect   Orientation Level Oriented X4   Memory Decreased short term memory;Decreased recall of recent events;Decreased recall of precautions   Following Commands Follows one step commands with increased time or repetition   Comments decreased insight into deficits   Assessment   Limitation Decreased ADL status;Decreased UE ROM;Decreased UE strength;Decreased Safe judgement during ADL;Decreased cognition;Decreased endurance;Decreased self-care trans;Decreased high-level ADLs  (decreased balance and mobility)   Prognosis Fair   Assessment Patient evaluated by Occupational Therapy.  Patient admitted with Acute respiratory failure with hypoxia (HCC).  The patients occupational profile, medical and therapy history includes a extensive additional review of physical, cognitive, or psychosocial history related to current functional performance.  Comorbidities affecting functional mobility and ADLS include: ETOH abuse, COPD, recurrent c-diff, falls, a-fib, CAD, noncompliance.  Prior to admission, patient was independent with functional mobility with rollator, independent with ADLS, and requiring assist for IADLS.  The evaluation identifies the following performance deficits: weakness, impaired balance, decreased endurance, increased fall risk, new onset of impairment of functional mobility, decreased  ADLS, decreased IADLS, pain, decreased activity tolerance, decreased safety awareness, impaired judgement, decreased cognition, and decreased strength, that result in activity limitations and/or participation restrictions. This evaluation requires clinical decision making of high complexity, because the patient presents with comorbidites that affect occupational performance and required significant modification of tasks or assistance with consideration of multiple treatment options.  The Barthel Index was used as a functional outcome tool presenting with a score of Barthel Index Score: 45, indicating marked limitations of functional mobility and ADLS.  The patient's raw score on the -PAC Daily Activity Inpatient Short Form is 16. A raw score of less than 19 suggests the patient may benefit from discharge to home. Please refer to the recommendation of the Occupational Therapist for safe discharge planning.  Patient will benefit from skilled Occupational Therapy services to address above deficits and facilitate a safe return to prior level of function.   Goals   Patient Goals to go home   STG Time Frame   (1-7 days)   Short Term Goal  Goals established to promote Patient Goals: to go home:  Grooming: independent seated; Bathing: min assist; Upper Body Dressing supervision; Lower Body Dressing: min assist; Toileting: supervision; Patient will increase ambulatory standard toilet transfer to supervision with rolling walker to increase performance and safety with ADLS and functional mobility; Patient will increase standing tolerance to 5 minutes during ADL task to decrease assistance level and decrease fall risk; Patient will increase bed mobility to independent in preparation for ADLS and transfers; Patient will increase functional mobility to and from bathroom with rolling walker with supervision to increase performance with ADLS and to use a toilet; Patient will tolerate 8 minutes of UE ROM/strengthening to increase  general activity tolerance and performance in ADLS/IADLS; Patient will improve functional activity tolerance to 10 minutes of sustained functional tasks to increase participation in basic self-care and decrease assistance level;  Patient will be able to to verbalize understanding and perform energy conservation/proper body mechanics during ADLS and functional mobility at least 75% of the time with minimal cueing to decrease signs of fatigue and increase stamina to return to prior level of function; Patient will increase dynamic sitting balance to fair+ to improve the ability to sit at edge of bed or on a chair for ADLS;  Patient will increase dynamic standing balance to fair to improve postural stability and decrease fall risk during standing ADLS and transfers.   LTG Time Frame   (8-14 days)   Long Term Goal Bathing: supervision; Upper Body Dressing independent; Lower Body Dressing: supervision; Toileting: independent; Patient will increase ambulatory standard toilet transfer to independent with rolling walker to increase performance and safety with ADLS and functional mobility; Patient will increase standing tolerance to 10 minutes during ADL task to decrease assistance level and decrease fall risk; Patient will increase functional mobility to and from bathroom with rolling walker independently to increase performance with ADLS and to use a toilet; Patient will tolerate 15 minutes of UE ROM/strengthening to increase general activity tolerance and performance in ADLS/IADLS; Patient will improve functional activity tolerance to 20 minutes of sustained functional tasks to increase participation in basic self-care and decrease assistance level;  Patient will be able to to verbalize understanding and perform energy conservation/proper body mechanics during ADLS and functional mobility at least 90% of the time with no cueing to decrease signs of fatigue and increase stamina to return to prior level of function; Patient  will increase dynamic sitting balance to good to improve the ability to sit at edge of bed or on a chair for ADLS;  Patient will increase dynamic standing balance to fair+ to improve postural stability and decrease fall risk during standing ADLS and transfers.  Pt will score >/= 20/24 on AM-PAC Daily Activity Inpatient scale to promote safe independence with ADLs and functional mobility; Pt will score >/= 75/100 on Barthel Index in order to decrease caregiver assistance needed and increase ability to perform ADLs and functional mobility.   Plan   Treatment Interventions ADL retraining;UE strengthening/ROM;Functional transfer training;Endurance training;Patient/family training;Equipment evaluation/education;Activityengagement;Compensatory technique education;Energy conservation   Goal Expiration Date 06/23/25   OT Frequency 3-5x/wk   Discharge Recommendation   Rehab Resource Intensity Level, OT II (Moderate Resource Intensity)   AM-PAC Daily Activity Inpatient   Lower Body Dressing 2   Bathing 2   Toileting 2   Upper Body Dressing 3   Grooming 3   Eating 4   Daily Activity Raw Score 16   Daily Activity Standardized Score (Calc for Raw Score >=11) 35.96   AM-PAC Applied Cognition Inpatient   Following a Speech/Presentation 3   Understanding Ordinary Conversation 4   Taking Medications 3   Remembering Where Things Are Placed or Put Away 3   Remembering List of 4-5 Errands 2   Taking Care of Complicated Tasks 2   Applied Cognition Raw Score 17   Applied Cognition Standardized Score 36.52   Barthel Index   Feeding 10   Bathing 0   Grooming Score 0   Dressing Score 5   Bladder Score 5   Bowels Score 5   Toilet Use Score 0   Transfers (Bed/Chair) Score 10   Mobility (Level Surface) Score 10   Stairs Score 0   Barthel Index Score 45   Additional Treatment Session   Start Time 1020   End Time 1043   Treatment Assessment Pt engaged in self-care tasks. Oral care and combing hair completed with set-up, seated in chair. Pt  requiring min A for UB bathing and dressing, mod A for LB bathing. Additional sit to stand with min A and verbal cues for safety and hand placement. Pt demo impulsivity at times. Pt requiring increased time to complete all tasks. Pt would benefit from cont OT services to maximize safety and fxl performance of ADLs. Recommend level II moderate resource intensity when medically cleared for d/c.     Cassie Plunkett OTR/L   [place] License # [id number]  [place] License # [id number]       None

## 2025-06-09 NOTE — ASSESSMENT & PLAN NOTE
Patient with recent fall, alcohol withdrawal and aspiration risk presented with acute hypoxia requiring 12 L of oxygen, weaned down during course of admission. Patient is otherwise afebrile during course of admission. Likely contributing factors recent alcohol abuse, poor p.o. intake, high aspiration risk.    6/8 CXR: Partial reexpansion of the left lung with probable small left pleural effusion. Persistent groundglass opacity in the right lung.  6/9 CXR: Significant interval improvement of pulmonary alveolar edema.    Continue with sodium chloride nebs every 8 hours  Continue levalbuterol nebs every 8 hours  Use Acapella device and incentive spirometry aggressively  Chest physiotherapy and repositioning  Consider CT chest if no improvement or diagnosis unclear  Pulmonology consulted, appreciate recommendations  Goal saturation >89%  Repeat CXR tomorrow, it he continue to improve no bronchoscopy

## 2025-06-09 NOTE — ASSESSMENT & PLAN NOTE
Ground level fall on afternoon of ED visit. Denies preceding headache, dizziness, lightheadedness  Multiple falls due to drinking alcohol in the past.    CT head and spine unremarkable.    Fall precautions  PT/OT

## 2025-06-09 NOTE — ASSESSMENT & PLAN NOTE
6/7 CXR: left basilar opacity   6/8 CXR: Partial reexpansion of the left lung with probable small left pleural effusion. Persistent groundglass opacity in the right lung.  6/9 CXR: Significant interval improvement of pulmonary alveolar edema    Continue breathing treatments

## 2025-06-09 NOTE — ASSESSMENT & PLAN NOTE
Presented to ED after alcohol intoxication and fall. EKG showing A-fib with RVR. Suspect secondary to alcohol use, or not using medications. Home meds: Eliquis 5mg BID, metoprolol 100mg BID, Cardizem 120mg BID    -CHADVASC score 3  -EKG A-fib w/RVR, rate 166  -s/p Cardizem drip    ED course: IV cardizem 10mgx1, D5NS, cardizem drip, IV mag 2gx1    Continue with metoprolol 100 mg p.o. twice daily, Cardizem 120 mg p.o. twice daily and Eliquis 5 mg twice daily. Will continue discussion with patient regarding risks and benefits of Eliquis.   Telemetry - currently rate controlled.

## 2025-06-09 NOTE — ASSESSMENT & PLAN NOTE
Hx recurrent C diff infection. Most recently diagnosed 5/8 hospitalization. Started on PO vancomycin 125 mg q6h 5/8. Did not take medications last few days prior to admission.    Continue with PO vancomycin  Contact precautions  Probiotics

## 2025-06-09 NOTE — ASSESSMENT & PLAN NOTE
Lab Results   Component Value Date    HGBA1C 5.2 05/05/2025       Recent Labs     06/08/25  2109 06/09/25  0636 06/09/25  1049 06/09/25  1600   POCGLU 175* 130 121 124       Blood Sugar Average: Last 72 hrs:  (P) [id number]    Chronic, unstable.  Home meds: Gabapentin 300mg TID, Metformin 500mg daily  Hypoglycemic initially likely secondary to poor PO intake    Hold metformin  Continue gabapentin  ISS  Hypoglycemia protocol  Carb controlled diet

## 2025-06-10 ENCOUNTER — APPOINTMENT (INPATIENT)
Dept: RADIOLOGY | Facility: HOSPITAL | Age: 65
DRG: 308 | End: 2025-06-10
Payer: COMMERCIAL

## 2025-06-10 PROBLEM — G93.40 ACUTE ENCEPHALOPATHY: Status: ACTIVE | Noted: 2025-06-07

## 2025-06-10 LAB
ALBUMIN SERPL BCG-MCNC: 3.2 G/DL (ref 3.5–5)
ALP SERPL-CCNC: 156 U/L (ref 34–104)
ALT SERPL W P-5'-P-CCNC: 55 U/L (ref 7–52)
ANION GAP SERPL CALCULATED.3IONS-SCNC: 6 MMOL/L (ref 4–13)
ANION GAP SERPL CALCULATED.3IONS-SCNC: 7 MMOL/L (ref 4–13)
AST SERPL W P-5'-P-CCNC: 70 U/L (ref 13–39)
BASOPHILS # BLD AUTO: 0.03 THOUSANDS/ÂΜL (ref 0–0.1)
BASOPHILS NFR BLD AUTO: 1 % (ref 0–1)
BILIRUB SERPL-MCNC: 0.92 MG/DL (ref 0.2–1)
BUN SERPL-MCNC: 18 MG/DL (ref 5–25)
BUN SERPL-MCNC: 18 MG/DL (ref 5–25)
CALCIUM ALBUM COR SERPL-MCNC: 8.9 MG/DL (ref 8.3–10.1)
CALCIUM SERPL-MCNC: 8.3 MG/DL (ref 8.4–10.2)
CALCIUM SERPL-MCNC: 8.3 MG/DL (ref 8.4–10.2)
CHLORIDE SERPL-SCNC: 97 MMOL/L (ref 96–108)
CHLORIDE SERPL-SCNC: 98 MMOL/L (ref 96–108)
CO2 SERPL-SCNC: 26 MMOL/L (ref 21–32)
CO2 SERPL-SCNC: 27 MMOL/L (ref 21–32)
CREAT SERPL-MCNC: 1.29 MG/DL (ref 0.6–1.3)
CREAT SERPL-MCNC: 1.34 MG/DL (ref 0.6–1.3)
EOSINOPHIL # BLD AUTO: 0.05 THOUSAND/ÂΜL (ref 0–0.61)
EOSINOPHIL NFR BLD AUTO: 1 % (ref 0–6)
ERYTHROCYTE [DISTWIDTH] IN BLOOD BY AUTOMATED COUNT: 16.9 % (ref 11.6–15.1)
FOLATE SERPL-MCNC: >22.3 NG/ML
GFR SERPL CREATININE-BSD FRML MDRD: 55 ML/MIN/1.73SQ M
GFR SERPL CREATININE-BSD FRML MDRD: 58 ML/MIN/1.73SQ M
GLUCOSE SERPL-MCNC: 115 MG/DL (ref 65–140)
GLUCOSE SERPL-MCNC: 122 MG/DL (ref 65–140)
GLUCOSE SERPL-MCNC: 125 MG/DL (ref 65–140)
GLUCOSE SERPL-MCNC: 130 MG/DL (ref 65–140)
GLUCOSE SERPL-MCNC: 131 MG/DL (ref 65–140)
GLUCOSE SERPL-MCNC: 144 MG/DL (ref 65–140)
HCT VFR BLD AUTO: 28.4 % (ref 36.5–49.3)
HGB BLD-MCNC: 8.9 G/DL (ref 12–17)
IMM GRANULOCYTES # BLD AUTO: 0.01 THOUSAND/UL (ref 0–0.2)
IMM GRANULOCYTES NFR BLD AUTO: 0 % (ref 0–2)
LYMPHOCYTES # BLD AUTO: 0.69 THOUSANDS/ÂΜL (ref 0.6–4.47)
LYMPHOCYTES NFR BLD AUTO: 17 % (ref 14–44)
MAGNESIUM SERPL-MCNC: 1.9 MG/DL (ref 1.9–2.7)
MCH RBC QN AUTO: 31 PG (ref 26.8–34.3)
MCHC RBC AUTO-ENTMCNC: 31.3 G/DL (ref 31.4–37.4)
MCV RBC AUTO: 99 FL (ref 82–98)
MONOCYTES # BLD AUTO: 0.51 THOUSAND/ÂΜL (ref 0.17–1.22)
MONOCYTES NFR BLD AUTO: 12 % (ref 4–12)
NEUTROPHILS # BLD AUTO: 2.88 THOUSANDS/ÂΜL (ref 1.85–7.62)
NEUTS SEG NFR BLD AUTO: 69 % (ref 43–75)
NRBC BLD AUTO-RTO: 1 /100 WBCS
OSMOLALITY UR/SERPL-RTO: 285 MMOL/KG (ref 282–298)
OSMOLALITY UR: 360 MMOL/KG (ref 250–900)
PLATELET # BLD AUTO: 66 THOUSANDS/UL (ref 149–390)
PMV BLD AUTO: 11.6 FL (ref 8.9–12.7)
POTASSIUM SERPL-SCNC: 4 MMOL/L (ref 3.5–5.3)
POTASSIUM SERPL-SCNC: 4.3 MMOL/L (ref 3.5–5.3)
PROT SERPL-MCNC: 6.3 G/DL (ref 6.4–8.4)
RBC # BLD AUTO: 2.87 MILLION/UL (ref 3.88–5.62)
SODIUM SERPL-SCNC: 130 MMOL/L (ref 135–147)
SODIUM SERPL-SCNC: 131 MMOL/L (ref 135–147)
SODIUM UR-SCNC: 67 MMOL/L
VIT B12 SERPL-MCNC: 682 PG/ML (ref 180–914)
WBC # BLD AUTO: 4.17 THOUSAND/UL (ref 4.31–10.16)

## 2025-06-10 PROCEDURE — 80048 BASIC METABOLIC PNL TOTAL CA: CPT

## 2025-06-10 PROCEDURE — 83930 ASSAY OF BLOOD OSMOLALITY: CPT | Performed by: INTERNAL MEDICINE

## 2025-06-10 PROCEDURE — 83735 ASSAY OF MAGNESIUM: CPT

## 2025-06-10 PROCEDURE — 82948 REAGENT STRIP/BLOOD GLUCOSE: CPT

## 2025-06-10 PROCEDURE — 85025 COMPLETE CBC W/AUTO DIFF WBC: CPT

## 2025-06-10 PROCEDURE — 82652 VIT D 1 25-DIHYDROXY: CPT

## 2025-06-10 PROCEDURE — 99232 SBSQ HOSP IP/OBS MODERATE 35: CPT | Performed by: STUDENT IN AN ORGANIZED HEALTH CARE EDUCATION/TRAINING PROGRAM

## 2025-06-10 PROCEDURE — 84300 ASSAY OF URINE SODIUM: CPT

## 2025-06-10 PROCEDURE — 71045 X-RAY EXAM CHEST 1 VIEW: CPT

## 2025-06-10 PROCEDURE — 80053 COMPREHEN METABOLIC PANEL: CPT

## 2025-06-10 PROCEDURE — 99232 SBSQ HOSP IP/OBS MODERATE 35: CPT | Performed by: INTERNAL MEDICINE

## 2025-06-10 PROCEDURE — 82607 VITAMIN B-12: CPT

## 2025-06-10 PROCEDURE — 94669 MECHANICAL CHEST WALL OSCILL: CPT

## 2025-06-10 PROCEDURE — 94640 AIRWAY INHALATION TREATMENT: CPT

## 2025-06-10 PROCEDURE — 82746 ASSAY OF FOLIC ACID SERUM: CPT

## 2025-06-10 PROCEDURE — 83935 ASSAY OF URINE OSMOLALITY: CPT

## 2025-06-10 PROCEDURE — 94760 N-INVAS EAR/PLS OXIMETRY 1: CPT

## 2025-06-10 RX ORDER — SODIUM CHLORIDE FOR INHALATION 3 %
4 VIAL, NEBULIZER (ML) INHALATION EVERY 8 HOURS
Status: DISCONTINUED | OUTPATIENT
Start: 2025-06-10 | End: 2025-06-12 | Stop reason: HOSPADM

## 2025-06-10 RX ORDER — SODIUM CHLORIDE FOR INHALATION 3 %
4 VIAL, NEBULIZER (ML) INHALATION
Status: DISCONTINUED | OUTPATIENT
Start: 2025-06-10 | End: 2025-06-10

## 2025-06-10 RX ORDER — INSULIN LISPRO 100 [IU]/ML
1-5 INJECTION, SOLUTION INTRAVENOUS; SUBCUTANEOUS
Status: DISCONTINUED | OUTPATIENT
Start: 2025-06-11 | End: 2025-06-12 | Stop reason: HOSPADM

## 2025-06-10 RX ORDER — MAGNESIUM SULFATE HEPTAHYDRATE 40 MG/ML
2 INJECTION, SOLUTION INTRAVENOUS ONCE
Status: COMPLETED | OUTPATIENT
Start: 2025-06-10 | End: 2025-06-10

## 2025-06-10 RX ADMIN — Medication 1 TABLET: at 08:55

## 2025-06-10 RX ADMIN — Medication 250 MG: at 08:55

## 2025-06-10 RX ADMIN — Medication 250 MG: at 17:53

## 2025-06-10 RX ADMIN — PANTOPRAZOLE SODIUM 40 MG: 40 TABLET, DELAYED RELEASE ORAL at 08:55

## 2025-06-10 RX ADMIN — MAGNESIUM SULFATE HEPTAHYDRATE 2 G: 40 INJECTION, SOLUTION INTRAVENOUS at 07:08

## 2025-06-10 RX ADMIN — RANOLAZINE 500 MG: 500 TABLET, FILM COATED, EXTENDED RELEASE ORAL at 08:55

## 2025-06-10 RX ADMIN — METOPROLOL SUCCINATE 100 MG: 100 TABLET, EXTENDED RELEASE ORAL at 08:55

## 2025-06-10 RX ADMIN — THIAMINE HYDROCHLORIDE 500 MG: 100 INJECTION, SOLUTION INTRAMUSCULAR; INTRAVENOUS at 02:58

## 2025-06-10 RX ADMIN — VANCOMYCIN HYDROCHLORIDE 125 MG: 125 CAPSULE ORAL at 17:53

## 2025-06-10 RX ADMIN — DILTIAZEM HYDROCHLORIDE 120 MG: 60 CAPSULE, EXTENDED RELEASE ORAL at 17:52

## 2025-06-10 RX ADMIN — SODIUM CHLORIDE SOLN NEBU 3% 4 ML: 3 NEBU SOLN at 07:23

## 2025-06-10 RX ADMIN — ATORVASTATIN CALCIUM 40 MG: 40 TABLET, FILM COATED ORAL at 08:55

## 2025-06-10 RX ADMIN — DILTIAZEM HYDROCHLORIDE 120 MG: 60 CAPSULE, EXTENDED RELEASE ORAL at 08:56

## 2025-06-10 RX ADMIN — GUAIFENESIN 600 MG: 600 TABLET, EXTENDED RELEASE ORAL at 08:55

## 2025-06-10 RX ADMIN — FERROUS SULFATE TAB 325 MG (65 MG ELEMENTAL FE) 325 MG: 325 (65 FE) TAB at 08:54

## 2025-06-10 RX ADMIN — GUAIFENESIN 600 MG: 600 TABLET, EXTENDED RELEASE ORAL at 22:28

## 2025-06-10 RX ADMIN — ALBUTEROL SULFATE 2.5 MG: 2.5 SOLUTION RESPIRATORY (INHALATION) at 15:06

## 2025-06-10 RX ADMIN — VANCOMYCIN HYDROCHLORIDE 125 MG: 125 CAPSULE ORAL at 12:55

## 2025-06-10 RX ADMIN — VANCOMYCIN HYDROCHLORIDE 125 MG: 125 CAPSULE ORAL at 23:12

## 2025-06-10 RX ADMIN — VANCOMYCIN HYDROCHLORIDE 125 MG: 125 CAPSULE ORAL at 00:16

## 2025-06-10 RX ADMIN — ALBUTEROL SULFATE 2.5 MG: 2.5 SOLUTION RESPIRATORY (INHALATION) at 07:24

## 2025-06-10 RX ADMIN — FLUTICASONE FUROATE AND VILANTEROL TRIFENATATE 1 PUFF: 200; 25 POWDER RESPIRATORY (INHALATION) at 08:55

## 2025-06-10 RX ADMIN — ASPIRIN 81 MG: 81 TABLET, CHEWABLE ORAL at 08:54

## 2025-06-10 RX ADMIN — VANCOMYCIN HYDROCHLORIDE 125 MG: 125 CAPSULE ORAL at 05:44

## 2025-06-10 RX ADMIN — RANOLAZINE 500 MG: 500 TABLET, FILM COATED, EXTENDED RELEASE ORAL at 17:53

## 2025-06-10 RX ADMIN — METOPROLOL SUCCINATE 100 MG: 100 TABLET, EXTENDED RELEASE ORAL at 17:53

## 2025-06-10 RX ADMIN — NICOTINE 1 PATCH: 21 PATCH, EXTENDED RELEASE TRANSDERMAL at 23:13

## 2025-06-10 RX ADMIN — SODIUM CHLORIDE 75 ML/HR: 0.9 INJECTION, SOLUTION INTRAVENOUS at 03:55

## 2025-06-10 RX ADMIN — NICOTINE 1 PATCH: 21 PATCH, EXTENDED RELEASE TRANSDERMAL at 00:17

## 2025-06-10 RX ADMIN — TAMSULOSIN HYDROCHLORIDE 0.4 MG: 0.4 CAPSULE ORAL at 17:53

## 2025-06-10 NOTE — ASSESSMENT & PLAN NOTE
Noted to have episodes of waxing and waning mentation during course of admission.  Oriented to self and knows that he is in the hospital, but unsure of exact date/time and details of daily life.    Urine random sodium: 67.0    Pending folate, B12, vitamin D, ammonia

## 2025-06-10 NOTE — ASSESSMENT & PLAN NOTE
Chronic, stable. Patient appears euvolemic on initial assessment.     -Initial labs notable for:     Monitor fluid status    Wt Readings from Last 3 Encounters:   06/10/25 80 kg (176 lb 5.9 oz)   05/28/25 76 kg (167 lb 8.8 oz)   05/13/25 79.1 kg (174 lb 4.8 oz)

## 2025-06-10 NOTE — ASSESSMENT & PLAN NOTE
Emphysema noted on imaging.  Currently on Breo and albuterol.  Would benefit from outpatient pulmonary follow-up and complete PFTs.

## 2025-06-10 NOTE — CASE MANAGEMENT
Case Management Assessment & Discharge Planning Note    Patient name Gregg Partida  Location ICU 03/ICU 03 MRN [id number]  : 1960 Date 6/10/2025       Current Admission Date: 2025  Current Admission Diagnosis:Acute respiratory failure with hypoxia (HCC)   Patient Active Problem List    Diagnosis Date Noted    Opacity of lung on imaging study 2025    Acute encephalopathy 2025    QT prolongation 2025    Abnormal CT scan, cervical spine 2025    Abnormal CXR 2025    Recurrent Clostridioides difficile diarrhea 2025    Abnormal EKG 2025    Hypertension 2025    Pancytopenia (HCC) 2025    CKD (chronic kidney disease) stage 2, GFR 60-89 ml/min 2025    Heart failure with preserved ejection fraction (HCC) 2025    Fracture of multiple ribs 2025    Wound of right buttock 2025    Dyspnea on exertion 2025    Head injury 2024    Hypotension 2024    Mucus plugging of bronchi 2024    Acute respiratory failure with hypoxia (HCC) 2024    Hyponatremia 2024    Closed fracture of one rib of left side 2024    Financial insecurity 2024    BPH (benign prostatic hyperplasia) 2024    Chronic alcohol use 2024    Hypertension 2024    Hemorrhagic cystitis 2024    Alcohol withdrawal (HCC) 2024    Hypothyroidism 2024    Gastrointestinal hemorrhage with melena 10/29/2023    Elevated troponin 2023    Chest pain 2023    Longstanding persistent atrial fibrillation (HCC) 2023    GERD (gastroesophageal reflux disease) 2023    Stable angina (HCC) 2022    Stage 3a chronic kidney disease (HCC) 2022    Fatty liver, alcoholic 04/15/2022    Nonischemic nontraumatic myocardial injury 04/10/2022    Atrial fibrillation with RVR (HCC) 10/25/2021    Mild protein-calorie malnutrition (HCC) 2021    Transaminitis 2021    Atrial fibrillation  ([facility]) 12/05/2020    Chronic heart failure with preserved ejection fraction ([facility]) 12/22/2019    Noncompliance 12/22/2019    SIRS (systemic inflammatory response syndrome) ([facility]) 11/23/2019    Alcohol use disorder 11/23/2019    Cervical spinal stenosis 10/17/2019    Ambulatory dysfunction 10/17/2019    Electrolyte abnormality 10/17/2019    Thrombocytopenia ([facility]) 06/09/2019    History of prostate cancer 06/07/2019    CAD (coronary artery disease) 06/07/2019    Nicotine abuse 06/07/2019    Fall 06/07/2019    Depression, recurrent ([facility]) 10/10/2018    Hx of CABG 10/10/2018    Dyslipidemia 10/10/2018    Hypertensive heart and chronic kidney disease with heart failure and stage 1 through stage 4 chronic kidney disease, or chronic kidney disease ([facility]) 10/09/2018    Type 2 diabetes mellitus, without long-term current use of insulin ([facility]) 10/09/2018    COPD (chronic obstructive pulmonary disease) ([facility]) 10/09/2018      LOS (days): 5  Geometric Mean LOS (GMLOS) (days): 2.3  Days to GMLOS:-2.4     OBJECTIVE:  PATIENT READMITTED TO HOSPITAL  Risk of Unplanned Readmission Score: 87.94     Current admission status: Inpatient    Preferred Pharmacy:   [place] [address]  Phone: [phone] Fax: [phone]    UNKNOWN - FOLLOW UP PRIOR TO DISCHARGE TO E-PRESCRIBE  No address on file      Primary Care Provider: Lilliana Bliss MD    Primary Insurance: [employer]  REP  Secondary Insurance:     ASSESSMENT:  Active Health Care Proxies       Gregg Partida Jr Health Care Representative - Son   Primary Phone: [phone] (Mobile)                 Advance Directives  Primary Contact: Gregg Partida Jr         Readmission Root Cause  30 Day Readmission: Yes  During your hospital stay, did someone (provider, nurse, ) explain your care to you in a way you could understand?: Refused to Provide Information  Did you feel medically stable to leave  the hospital?: Refused to Provide Information  Were you able to pay for your medication at the pharmacy?: Refused to Provide Information  Did you have reliable transportation to take you to your appointments?: Refused to Provide Information  During previous admission, was a post-acute recommendation made?: Yes  What post-acute resources were offered?: STR (declined)  Patient was readmitted due to: acute respiratory failure, alcohol intoxication  Action Plan: medical management    Patient Information  Admitted from:: Home  Mental Status: Alert  During Assessment patient was accompanied by: Not accompanied during assessment  Assessment information provided by:: Patient  Primary Caregiver: Family  Caregiver's Name:: Gregg Partida   Caregiver's Relationship to Patient:: Family Member (son)  Caregiver's Telephone Number:: [phone]  Support Systems: Self, Son  County of Residence: [place]  What city do you live in?: [place]  Home entry access options. Select all that apply.: Elevator  Type of Current Residence: Apartment  Floor Level: 5  Upon entering residence, is there a bedroom on the main floor (no further steps)?: Yes  Upon entering residence, is there a bathroom on the main floor (no further steps)?: Yes  Living Arrangements: Lives Alone    Activities of Daily Living Prior to Admission  Functional Status: Independent  Does patient use assisted devices?: Yes  Assisted Devices (DME) used: Home Oxygen concentrator, Portable Oxygen tanks, Walker  DME Company Name (respiratory supplies): [employer]  Does patient currently own DME?: Yes  What DME does the patient currently own?: Home Oxygen concentrator, Portable Oxygen tanks, Walker  Does patient have a history of Outpatient Therapy (PT/OT)?: No  Does the patient have a history of Short-Term Rehab?: No  Does patient have a history of HHC?: No  Does patient currently have HHC?: No    Patient Information Continued  Income Source: SSI/SSD  Does patient have  prescription coverage?: Yes  Can the patient afford their medications and any related supplies (such as glucometers or test strips)?: Yes  Does patient receive dialysis treatments?: No  Does patient have a history of substance abuse?: Currently using  Current substance use preference: Alcohol/ETOH  Is patient currently in treatment for substance abuse?: Not appropriate at this time to discuss.  Does patient have a history of Mental Health Diagnosis?: No    Means of Transportation  Means of Transport to Appts:: [place]       DISCHARGE DETAILS:    Discharge planning discussed with:: Patient  Freedom of Choice: Yes  Comments - Freedom of Choice: SW met briefly with pt to assess needs and discuss plans.  Pt was very angry, expressed that he did not want to be bothered and didn't have time for this.  SW attempted to talk with pt about recommendations for STR placement but all pt wanted to focus on was his missing clothes and wallet.  SW offered to try to locate pt's clothes because pt insisted he did not come to the hospital naked.  SW offered that there is a very good chance pt's wallet was left at home.  At this time it seems pt is planning on returning home when discharged.  SW will follow to monitor progress and attempt to revisit to discuss recommendations and plans further.  CM contacted family/caregiver?: Yes    Contacts  Patient Contacts: Gregg Partida Jr.  Relationship to Patient:: Family (son)  Contact Method: Phone  Phone Number: [phone]  Reason/Outcome: Other (Comment) (message left)    Other Referral/Resources/Interventions Provided:  Interventions: Short Term Rehab    Treatment Team Recommendation: Short Term Rehab  Expected Discharge Disposition: Home or Self Care  Transport at Discharge : Wheelchair van, Other (Comment) ([place])    IMM Given (Date):: 06/10/25  IMM Given to:: Patient (IMM #2 reviewed with pt. Pt grabbed paper and signed IMM. Copy left with pt. Copy placed in scan bin for  chart.)

## 2025-06-10 NOTE — ASSESSMENT & PLAN NOTE
Presented to ED after alcohol intoxication and fall. EKG showing A-fib with RVR. Suspect secondary to alcohol use, or not using medications. Home meds: Eliquis 5mg BID, metoprolol 100mg BID, Cardizem 120mg BID    -CHADVASC score 3  -EKG A-fib w/RVR, rate 166  -s/p Cardizem drip    ED course: IV cardizem 10mgx1, D5NS, cardizem drip, IV mag 2gx1    Continue with metoprolol 100 mg p.o. twice daily, Cardizem 120 mg p.o. twice daily.   Resume home eliquis 5 mg BID  Risk vs benefit discussion prior to DC

## 2025-06-10 NOTE — ASSESSMENT & PLAN NOTE
Lab Results   Component Value Date    HGBA1C 5.2 05/05/2025       Recent Labs     06/09/25  1049 06/09/25  1600 06/09/25  2047 06/10/25  0739   POCGLU 121 124 148* 115       Blood Sugar Average: Last 72 hrs:  (P) 135    Chronic, unstable.  Home meds: Gabapentin 300mg TID, Metformin 500mg daily  Hypoglycemic initially likely secondary to poor PO intake    Hold metformin  Continue gabapentin  ISS  Hypoglycemia protocol  Carb controlled diet

## 2025-06-10 NOTE — ASSESSMENT & PLAN NOTE
Displays waxing and waning mentation regarding orientation to location and time.  Is oriented to self.  Possibly secondary to electrolyte abnormalities, alcohol withdrawal, or sequela of not being on anticoagulation for 2 days.    Vitamin D, B9, B12 ordered  If mentation continues to wane can consider MRI brain tomorrow

## 2025-06-10 NOTE — ASSESSMENT & PLAN NOTE
Last drink morning of ED visit. Does not use home medication Naltrexone 50mg daily. Ground level fall on afternoon of initial presentation.    POA CIWA 9, CIWA is trending down during course of admission.    -Initial EtOH: 346  -CT head: No acute intracranial abnormality  -CT spine: No cervical spine fracture or traumatic malalignment    6/7 : Worsening CIWA score, last CIWA 32 at 10 AM. Transferred to ICU for phenobarbital.    Continue CIWA protocol  Thiamine, folic acid, multivitamin  Seizure, fall, aspiration precautions  Hold home Naltrexone  PT/OT

## 2025-06-10 NOTE — PROGRESS NOTES
Progress Note - Pulmonology   Name: Gregg Partida 65 y.o. male I MRN: [id number]  Unit/Bed#: ICU 03 I Date of Admission: 6/5/2025   Date of Service: 6/10/2025 I Hospital Day: 5    Assessment & Plan  Acute respiratory failure with hypoxia (HCC)  Titrate oxygen to maintain POX > or = 89%.   Activity as tolerated.  Ambulatory POX prior to D/C.  COPD (chronic obstructive pulmonary disease) (HCC)  Emphysema noted on imaging.  Currently on Breo and albuterol.  Would benefit from outpatient pulmonary follow-up and complete PFTs.  Opacity of lung on imaging study  Left basilar opacity on CXR progressed to complete opacification of the left hemithorax with improvement over the last 48 hours.  Chest imaging shows continued improvement.  Continue albuterol/hypertonic saline/vest therapy.  Presumed due to mucus plugging/aspiration.    Discussed with primary team.  Will sign off.  Please call with further questions or concerns.    24 Hour Events : Improved  Subjective : Gregg is resting in bed.  He reports he feels better.  He does not have significant sputum production.  He wants to eat.    Objective :  Temp:  [97.7 °F (36.5 °C)-98 °F (36.7 °C)] 98 °F (36.7 °C)  HR:  [57-71] 66  BP: (102-133)/(55-86) 133/86  Resp:  [16-23] 20  SpO2:  [94 %-99 %] 96 %  O2 Device: None (Room air)  Nasal Cannula O2 Flow Rate (L/min):  [2 L/min-4 L/min] 2 L/min    Physical Exam  Vitals reviewed.   Constitutional:       General: He is not in acute distress.     Appearance: He is well-developed. He is not toxic-appearing or diaphoretic.   HENT:      Head: Normocephalic and atraumatic.     Eyes:      General: No scleral icterus.    Neck:      Trachea: No tracheal deviation.     Cardiovascular:      Rate and Rhythm: Normal rate and regular rhythm.      Heart sounds: S1 normal and S2 normal. No murmur heard.     No friction rub. No gallop.   Pulmonary:      Effort: Pulmonary effort is normal. No tachypnea, accessory muscle usage or respiratory  distress.      Breath sounds: Normal breath sounds. No stridor. No decreased breath sounds, wheezing, rhonchi or rales.   Chest:      Chest wall: No tenderness.   Abdominal:      General: Bowel sounds are normal. There is no distension.      Palpations: Abdomen is soft.      Tenderness: There is no abdominal tenderness.     Musculoskeletal:         General: No tenderness.      Cervical back: Neck supple.      Right lower leg: No edema.      Left lower leg: No edema.     Skin:     General: Skin is warm and dry.      Findings: No rash.     Neurological:      Mental Status: He is alert and oriented to person, place, and time.      GCS: GCS eye subscore is 4. GCS verbal subscore is 5. GCS motor subscore is 6.     Psychiatric:         Speech: Speech normal.         Behavior: Behavior normal. Behavior is cooperative.         Lab Results: I have reviewed the following results:   .     06/10/25  0539   WBC 4.17*   HGB 8.9*   HCT 28.4*   PLT 66*   SODIUM 130*   K 4.3   CL 97   CO2 26   BUN 18   CREATININE 1.34*   GLUC 125   MG 1.9   AST 70*   ALT 55*   ALB 3.2*   TBILI 0.92   ALKPHOS 156*     Chest x-ray shows continued improvement of opacification.

## 2025-06-10 NOTE — ASSESSMENT & PLAN NOTE
Patient with recent fall, alcohol withdrawal and aspiration risk presented with acute hypoxia requiring 12 L of oxygen, weaned down during course of admission. Patient is otherwise afebrile during course of admission. Likely contributing factors recent alcohol abuse, poor p.o. intake, high aspiration risk.    6/8 CXR: Partial reexpansion of the left lung with probable small left pleural effusion. Persistent groundglass opacity in the right lung.  6/9 CXR: Significant interval improvement of pulmonary alveolar edema.    Continue with sodium chloride nebs every 8 hours  Continue albuterol nebs every 8 hours  Use Acapella device and incentive spirometry aggressively  Chest physiotherapy and repositioning  Pulmonology consulted, appreciate recommendations  Goal saturation >89%  Repeat CXR significantly improved and will not need to proceed with bronchoscopy

## 2025-06-10 NOTE — PLAN OF CARE
Problem: INFECTION - ADULT  Goal: Absence or prevention of progression during hospitalization  Description: INTERVENTIONS:  - Assess and monitor for signs and symptoms of infection  - Monitor lab/diagnostic results  - Monitor all insertion sites, i.e. indwelling lines, tubes, and drains  - Monitor endotracheal if appropriate and nasal secretions for changes in amount and color  - [place] appropriate cooling/warming therapies per order  - Administer medications as ordered  - Instruct and encourage patient and family to use good hand hygiene technique  - Identify and instruct in appropriate isolation precautions for identified infection/condition  Outcome: Progressing

## 2025-06-10 NOTE — PROGRESS NOTES
Progress Note - Family Medicine   Name: Gregg Partida 65 y.o. male I MRN: [id number]  Unit/Bed#: ICU 03 I Date of Admission: 6/5/2025   Date of Service: 6/10/2025 I Hospital Day: 5     Assessment & Plan  Acute respiratory failure with hypoxia (HCC)  Patient with recent fall, alcohol withdrawal and aspiration risk presented with acute hypoxia requiring 12 L of oxygen, weaned down during course of admission. Patient is otherwise afebrile during course of admission. Likely contributing factors recent alcohol abuse, poor p.o. intake, high aspiration risk.    6/8 CXR: Partial reexpansion of the left lung with probable small left pleural effusion. Persistent groundglass opacity in the right lung.  6/9 CXR: Significant interval improvement of pulmonary alveolar edema.    Continue with sodium chloride nebs every 8 hours  Continue albuterol nebs every 8 hours  Use Acapella device and incentive spirometry aggressively  Chest physiotherapy and repositioning  Pulmonology consulted, appreciate recommendations  Goal saturation >89%  Repeat CXR significantly improved and will not need to proceed with bronchoscopy  Alcohol withdrawal (HCC)  Last drink morning of ED visit. Does not use home medication Naltrexone 50mg daily. Ground level fall on afternoon of initial presentation.    POA CIWA 9, CIWA is trending down during course of admission.    -Initial EtOH: 346  -CT head: No acute intracranial abnormality  -CT spine: No cervical spine fracture or traumatic malalignment    6/7 : Worsening CIWA score, last CIWA 32 at 10 AM. Transferred to ICU for phenobarbital.    Continue CIWA protocol  Thiamine, folic acid, multivitamin  Seizure, fall, aspiration precautions  Hold home Naltrexone  PT/OT  Acute encephalopathy  Noted to have episodes of waxing and waning mentation during course of admission.  Oriented to self and knows that he is in the hospital, but unsure of exact date/time and details of daily life.    Urine random sodium:  67.0    Pending folate, B12, vitamin D, ammonia  Atrial fibrillation with RVR (HCC)  Presented to ED after alcohol intoxication and fall. EKG showing A-fib with RVR. Suspect secondary to alcohol use, or not using medications. Home meds: Eliquis 5mg BID, metoprolol 100mg BID, Cardizem 120mg BID    -CHADVASC score 3  -EKG A-fib w/RVR, rate 166  -s/p Cardizem drip    ED course: IV cardizem 10mgx1, D5NS, cardizem drip, IV mag 2gx1    Continue with metoprolol 100 mg p.o. twice daily, Cardizem 120 mg p.o. twice daily.   Resume home eliquis 5 mg BID  Risk vs benefit discussion prior to DC  Opacity of lung on imaging study  6/7 CXR: left basilar opacity   6/8 CXR: Partial reexpansion of the left lung with probable small left pleural effusion. Persistent groundglass opacity in the right lung.  6/9 CXR: Significant interval improvement of pulmonary alveolar edema    Continue breathing treatments  Elevated troponin  Troponin 70>173>192.    Non-MI troponin elevation in setting of A-fib with RVR.  History of prostate cancer  Chronic, stable. S/p resection 2021.    Home meds Flomax 0.4mg    Continue home med  Recurrent Clostridioides difficile diarrhea  Hx recurrent C diff infection. Most recently diagnosed 5/8 hospitalization. Started on PO vancomycin 125 mg q6h 5/8. Did not take medications last few days prior to admission.    Continue with PO vancomycin  Contact precautions  Probiotics  Fall  Ground level fall on afternoon of ED visit. Denies preceding headache, dizziness, lightheadedness  Multiple falls due to drinking alcohol in the past.    CT head and spine unremarkable.    Fall precautions  PT/OT  Type 2 diabetes mellitus, without long-term current use of insulin ([facility])  Lab Results   Component Value Date    HGBA1C 5.2 05/05/2025       Recent Labs     06/09/25  1049 06/09/25  1600 06/09/25  2047 06/10/25  0739   POCGLU 121 124 148* 115       Blood Sugar Average: Last 72 hrs:  (P) 135    Chronic, unstable.  Home meds:  Gabapentin 300mg TID, Metformin 500mg daily  Hypoglycemic initially likely secondary to poor PO intake    Hold metformin  Continue gabapentin  ISS  Hypoglycemia protocol  Carb controlled diet  COPD (chronic obstructive pulmonary disease) (HCC)  Chronic, stable.    Home meds: Breo-Elipta daily    Continue breo and nebulizers PRN  Electrolyte abnormality  Initial labs notable for  Mg 1.2: repleted w/ IV mag 2g x1 while in ED.     Monitor and replete as indicated  CAD (coronary artery disease)  CAD s/p CABG in 2004.   Home medications: ASA 81 mg, Ranexa 500 mg BID,   No chest pain endorsed at time of initial assessment.     Aspirin daily  Continue Ranexa  Thrombocytopenia (HCC)  Noted history of thrombocytopenia.   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  No active signs of bleeding at time of examination.     Continue anticoagulation with Eliquis in setting of A-fib w/ RVR  Continue aspirin  Monitor for signs of bleeding  Transaminitis  Initial labs notable for: , ALT 65,   Suspect secondary to acute on chronic alcohol use.     Improving  6/7 : AST 61, ALT 43,     Continue with Lipitor  Continue to monitor   Chronic heart failure with preserved ejection fraction (HCC)  Chronic, stable. Patient appears euvolemic on initial assessment.     -Initial labs notable for:     Monitor fluid status    Wt Readings from Last 3 Encounters:   06/10/25 80 kg (176 lb 5.9 oz)   05/28/25 76 kg (167 lb 8.8 oz)   05/13/25 79.1 kg (174 lb 4.8 oz)       VTE Pharmacologic Prophylaxis: VTE Score: 4 High Risk (Score >/= 5) - Pharmacological DVT Prophylaxis Ordered: apixaban (Eliquis). Sequential Compression Devices Ordered.    Mobility:   Basic Mobility Inpatient Raw Score: 14  JH-HLM Goal: 4: Move to chair/commode  JH-HLM Achieved: 4: Move to chair/commode  JH-HLM Goal achieved. Continue to encourage appropriate mobility.    Patient Centered Rounds: I performed bedside rounds with nursing  staff today.   Discussions with Specialists or Other Care Team Provider: pulmonology    Education and Discussions with Family / Patient: Patient declined call to .     Current Length of Stay: 5 day(s)  Current Patient Status: Inpatient   Certification Statement: The patient will continue to require additional inpatient hospital stay due to respiratory failure, encephalopathy  Discharge Plan: Anticipate discharge in 24-48 hrs to home.    Code Status: Level 1 - Full Code    Subjective   Patient examined at bedside, noting increased confusion. Patient noting that he believed he had been changing rooms several times throughout admission despite being in same bed. Reporting  he had lost his pants although possessions unable to be located.      Objective :  Temp:  [96.6 °F (35.9 °C)-98 °F (36.7 °C)] 97.6 °F (36.4 °C)  HR:  [57-86] 71  BP: (102-133)/(55-86) 133/82  Resp:  [18-21] 20  SpO2:  [94 %-99 %] 96 %  O2 Device: None (Room air)  Nasal Cannula O2 Flow Rate (L/min):  [2 L/min-3 L/min] 2 L/min    Body mass index is 22.64 kg/m².     Input and Output Summary (last 24 hours):     Intake/Output Summary (Last 24 hours) at 6/10/2025 1823  Last data filed at 6/10/2025 1401  Gross per 24 hour   Intake 2790 ml   Output 1075 ml   Net 1715 ml       Physical Exam  Vitals reviewed.   Constitutional:       General: He is not in acute distress.     Appearance: Normal appearance.   HENT:      Head: Normocephalic and atraumatic.      Right Ear: External ear normal.      Left Ear: External ear normal.      Nose: Nose normal.      Mouth/Throat:      Mouth: Mucous membranes are moist.      Pharynx: Oropharynx is clear.     Eyes:      Extraocular Movements: Extraocular movements intact.       Cardiovascular:      Rate and Rhythm: Normal rate and regular rhythm.      Pulses: Normal pulses.      Heart sounds: Normal heart sounds.   Pulmonary:      Breath sounds: Wheezing (b/l) present.      Comments: Decreased air movement  b/l.  Abdominal:      General: Abdomen is flat. There is no distension.      Palpations: Abdomen is soft.      Tenderness: There is no abdominal tenderness. There is no guarding.     Musculoskeletal:         General: No tenderness. Normal range of motion.      Right lower leg: No edema.      Left lower leg: No edema.     Skin:     General: Skin is warm and dry.      Capillary Refill: Capillary refill takes less than 2 seconds.     Neurological:      General: No focal deficit present.      Mental Status: He is alert.      Comments: Oriented to self and place only.   Psychiatric:         Mood and Affect: Mood normal.         Behavior: Behavior normal.         Lines/Drains:              Lab Results: I have reviewed the following results:   Results from last 7 days   Lab Units 06/10/25  0539   WBC Thousand/uL 4.17*   HEMOGLOBIN g/dL 8.9*   HEMATOCRIT % 28.4*   PLATELETS Thousands/uL 66*   SEGS PCT % 69   LYMPHO PCT % 17   MONO PCT % 12   EOS PCT % 1     Results from last 7 days   Lab Units 06/10/25  0539   SODIUM mmol/L 130*   POTASSIUM mmol/L 4.3   CHLORIDE mmol/L 97   CO2 mmol/L 26   BUN mg/dL 18   CREATININE mg/dL 1.34*   ANION GAP mmol/L 7   CALCIUM mg/dL 8.3*   ALBUMIN g/dL 3.2*   TOTAL BILIRUBIN mg/dL 0.92   ALK PHOS U/L 156*   ALT U/L 55*   AST U/L 70*   GLUCOSE RANDOM mg/dL 125         Results from last 7 days   Lab Units 06/10/25  1548 06/10/25  1059 06/10/25  0739 06/09/25  2047 06/09/25  1600 06/09/25  1049 06/09/25  0636 06/08/25  2109 06/08/25  1623 06/08/25  1113 06/08/25  0708 06/08/25  0000   POC GLUCOSE mg/dl 122 144* 115 148* 124 121 130 175* 192* 113 122 104               Recent Cultures (last 7 days):               Last 24 Hours Medication List:     Current Facility-Administered Medications:     albuterol inhalation solution 2.5 mg, Q8H **AND** [COMPLETED] sodium chloride 3 % inhalation solution 4 mL, Q8H    [Transfer Hold] apixaban (ELIQUIS) tablet 5 mg, BID    aspirin chewable tablet 81 mg, Daily     atorvastatin (LIPITOR) tablet 40 mg, Daily    diltiazem (CARDIZEM SR) 12 hr capsule 120 mg, BID    ferrous sulfate tablet 325 mg, Daily With Breakfast    fluticasone-vilanterol 200-25 mcg/actuation 1 puff, Daily    guaiFENesin (MUCINEX) 12 hr tablet 600 mg, Q12H SEDA    insulin lispro (HumALOG/ADMELOG) 100 units/mL subcutaneous injection 1-5 Units, TID AC **AND** Fingerstick Glucose (POCT), TID AC    insulin lispro (HumALOG/ADMELOG) 100 units/mL subcutaneous injection 1-5 Units, HS    melatonin tablet 6 mg, HS PRN    metoprolol succinate (TOPROL-XL) 24 hr tablet 100 mg, BID    multivitamin-minerals (CENTRUM) tablet 1 tablet, Daily    nicotine (NICODERM CQ) 21 mg/24 hr TD 24 hr patch 1 patch, Q24H    pantoprazole (PROTONIX) EC tablet 40 mg, Daily    ranolazine (RANEXA) 12 hr tablet 500 mg, BID    saccharomyces boulardii (FLORASTOR) capsule 250 mg, BID    sodium chloride 3 % inhalation solution 4 mL, Q8H    tamsulosin (FLOMAX) capsule 0.4 mg, Daily With Dinner    vancomycin (VANCOCIN) capsule 125 mg, Q6H SEDA    Administrative Statements   Today, Patient Was Seen By: Mary Ann Stockton DO      **Please Note: This note may have been constructed using a voice recognition system.**

## 2025-06-10 NOTE — ASSESSMENT & PLAN NOTE
Left basilar opacity on CXR progressed to complete opacification of the left hemithorax with improvement over the last 48 hours.  Chest imaging shows continued improvement.  Continue albuterol/hypertonic saline/vest therapy.  Presumed due to mucus plugging/aspiration.

## 2025-06-11 LAB
AMMONIA PLAS-SCNC: 28 UMOL/L (ref 18–72)
ANION GAP SERPL CALCULATED.3IONS-SCNC: 7 MMOL/L (ref 4–13)
BASOPHILS # BLD AUTO: 0.03 THOUSANDS/ÂΜL (ref 0–0.1)
BASOPHILS NFR BLD AUTO: 1 % (ref 0–1)
BUN SERPL-MCNC: 18 MG/DL (ref 5–25)
CALCIUM SERPL-MCNC: 8.5 MG/DL (ref 8.4–10.2)
CHLORIDE SERPL-SCNC: 97 MMOL/L (ref 96–108)
CO2 SERPL-SCNC: 27 MMOL/L (ref 21–32)
CREAT SERPL-MCNC: 1.2 MG/DL (ref 0.6–1.3)
EOSINOPHIL # BLD AUTO: 0.06 THOUSAND/ÂΜL (ref 0–0.61)
EOSINOPHIL NFR BLD AUTO: 1 % (ref 0–6)
ERYTHROCYTE [DISTWIDTH] IN BLOOD BY AUTOMATED COUNT: 17.2 % (ref 11.6–15.1)
GFR SERPL CREATININE-BSD FRML MDRD: 63 ML/MIN/1.73SQ M
GLUCOSE SERPL-MCNC: 118 MG/DL (ref 65–140)
GLUCOSE SERPL-MCNC: 120 MG/DL (ref 65–140)
GLUCOSE SERPL-MCNC: 121 MG/DL (ref 65–140)
GLUCOSE SERPL-MCNC: 127 MG/DL (ref 65–140)
GLUCOSE SERPL-MCNC: 131 MG/DL (ref 65–140)
HCT VFR BLD AUTO: 28 % (ref 36.5–49.3)
HGB BLD-MCNC: 8.8 G/DL (ref 12–17)
IMM GRANULOCYTES # BLD AUTO: 0.02 THOUSAND/UL (ref 0–0.2)
IMM GRANULOCYTES NFR BLD AUTO: 0 % (ref 0–2)
LYMPHOCYTES # BLD AUTO: 0.86 THOUSANDS/ÂΜL (ref 0.6–4.47)
LYMPHOCYTES NFR BLD AUTO: 18 % (ref 14–44)
MCH RBC QN AUTO: 30.9 PG (ref 26.8–34.3)
MCHC RBC AUTO-ENTMCNC: 31.4 G/DL (ref 31.4–37.4)
MCV RBC AUTO: 98 FL (ref 82–98)
MONOCYTES # BLD AUTO: 0.79 THOUSAND/ÂΜL (ref 0.17–1.22)
MONOCYTES NFR BLD AUTO: 16 % (ref 4–12)
NEUTROPHILS # BLD AUTO: 3.1 THOUSANDS/ÂΜL (ref 1.85–7.62)
NEUTS SEG NFR BLD AUTO: 64 % (ref 43–75)
NRBC BLD AUTO-RTO: 0 /100 WBCS
PLATELET # BLD AUTO: 81 THOUSANDS/UL (ref 149–390)
PMV BLD AUTO: 11.5 FL (ref 8.9–12.7)
POTASSIUM SERPL-SCNC: 4.3 MMOL/L (ref 3.5–5.3)
RBC # BLD AUTO: 2.85 MILLION/UL (ref 3.88–5.62)
SODIUM SERPL-SCNC: 131 MMOL/L (ref 135–147)
WBC # BLD AUTO: 4.86 THOUSAND/UL (ref 4.31–10.16)

## 2025-06-11 PROCEDURE — 97530 THERAPEUTIC ACTIVITIES: CPT

## 2025-06-11 PROCEDURE — 80048 BASIC METABOLIC PNL TOTAL CA: CPT

## 2025-06-11 PROCEDURE — 85025 COMPLETE CBC W/AUTO DIFF WBC: CPT

## 2025-06-11 PROCEDURE — 99232 SBSQ HOSP IP/OBS MODERATE 35: CPT | Performed by: INTERNAL MEDICINE

## 2025-06-11 PROCEDURE — 94640 AIRWAY INHALATION TREATMENT: CPT

## 2025-06-11 PROCEDURE — 94664 DEMO&/EVAL PT USE INHALER: CPT

## 2025-06-11 PROCEDURE — 94760 N-INVAS EAR/PLS OXIMETRY 1: CPT

## 2025-06-11 PROCEDURE — 82140 ASSAY OF AMMONIA: CPT

## 2025-06-11 PROCEDURE — 94668 MNPJ CHEST WALL SBSQ: CPT

## 2025-06-11 PROCEDURE — 82948 REAGENT STRIP/BLOOD GLUCOSE: CPT

## 2025-06-11 RX ORDER — LANOLIN ALCOHOL/MO/W.PET/CERES
100 CREAM (GRAM) TOPICAL DAILY
Status: DISCONTINUED | OUTPATIENT
Start: 2025-06-11 | End: 2025-06-12 | Stop reason: HOSPADM

## 2025-06-11 RX ORDER — ALBUTEROL SULFATE 0.83 MG/ML
2.5 SOLUTION RESPIRATORY (INHALATION)
Status: DISCONTINUED | OUTPATIENT
Start: 2025-06-11 | End: 2025-06-12 | Stop reason: HOSPADM

## 2025-06-11 RX ORDER — FUROSEMIDE 20 MG/1
20 TABLET ORAL ONCE
Status: COMPLETED | OUTPATIENT
Start: 2025-06-11 | End: 2025-06-11

## 2025-06-11 RX ADMIN — METOPROLOL SUCCINATE 100 MG: 100 TABLET, EXTENDED RELEASE ORAL at 09:10

## 2025-06-11 RX ADMIN — APIXABAN 5 MG: 5 TABLET, FILM COATED ORAL at 09:10

## 2025-06-11 RX ADMIN — Medication 250 MG: at 09:10

## 2025-06-11 RX ADMIN — RANOLAZINE 500 MG: 500 TABLET, FILM COATED, EXTENDED RELEASE ORAL at 09:11

## 2025-06-11 RX ADMIN — GUAIFENESIN 600 MG: 600 TABLET, EXTENDED RELEASE ORAL at 09:11

## 2025-06-11 RX ADMIN — DILTIAZEM HYDROCHLORIDE 120 MG: 60 CAPSULE, EXTENDED RELEASE ORAL at 09:12

## 2025-06-11 RX ADMIN — VANCOMYCIN HYDROCHLORIDE 125 MG: 125 CAPSULE ORAL at 13:37

## 2025-06-11 RX ADMIN — NICOTINE 1 PATCH: 21 PATCH, EXTENDED RELEASE TRANSDERMAL at 23:42

## 2025-06-11 RX ADMIN — ALBUTEROL SULFATE 2.5 MG: 2.5 SOLUTION RESPIRATORY (INHALATION) at 13:14

## 2025-06-11 RX ADMIN — FLUTICASONE FUROATE AND VILANTEROL TRIFENATATE 1 PUFF: 200; 25 POWDER RESPIRATORY (INHALATION) at 09:13

## 2025-06-11 RX ADMIN — ATORVASTATIN CALCIUM 40 MG: 40 TABLET, FILM COATED ORAL at 09:10

## 2025-06-11 RX ADMIN — VANCOMYCIN HYDROCHLORIDE 125 MG: 125 CAPSULE ORAL at 06:33

## 2025-06-11 RX ADMIN — PANTOPRAZOLE SODIUM 40 MG: 40 TABLET, DELAYED RELEASE ORAL at 09:11

## 2025-06-11 RX ADMIN — ASPIRIN 81 MG: 81 TABLET, CHEWABLE ORAL at 09:18

## 2025-06-11 RX ADMIN — VANCOMYCIN HYDROCHLORIDE 125 MG: 125 CAPSULE ORAL at 23:43

## 2025-06-11 RX ADMIN — ALBUTEROL SULFATE 2.5 MG: 2.5 SOLUTION RESPIRATORY (INHALATION) at 07:00

## 2025-06-11 RX ADMIN — FERROUS SULFATE TAB 325 MG (65 MG ELEMENTAL FE) 325 MG: 325 (65 FE) TAB at 09:10

## 2025-06-11 RX ADMIN — SODIUM CHLORIDE SOLN NEBU 3% 4 ML: 3 NEBU SOLN at 07:00

## 2025-06-11 RX ADMIN — FUROSEMIDE 20 MG: 20 TABLET ORAL at 09:10

## 2025-06-11 RX ADMIN — Medication 1 TABLET: at 09:10

## 2025-06-11 NOTE — ASSESSMENT & PLAN NOTE
6/7 CXR: left hemithorax complete opacification   6/8 CXR: Partial reexpansion of the left lung with probable small left pleural effusion. Persistent groundglass opacity in the right lung.  6/9 CXR: Significant interval improvement of pulmonary alveolar edema  6/10 CXR: Persistent mild pulmonary venous congestion with probable left base atelectasis    Plan:  Pulm consulted, continue to appreciate recs   Recommend CXR on OP basis to assess resolution  Chest PT  Hypertonic saline nebs + albuterol neb   Incentive spirometer

## 2025-06-11 NOTE — OCCUPATIONAL THERAPY NOTE
OT TREATMENT     06/11/25 1035   Note Type   Note Type Treatment   Cancel Reasons Refusal   Licensure   [place] License Number  Ursula Grayrosy MS OTR/L [id number]

## 2025-06-11 NOTE — ASSESSMENT & PLAN NOTE
Initial labs notable for  Mg 1.2: repleted w/ IV mag 2g x1 while in ED.     Monitor and replete as indicated   Age appropriate behavior

## 2025-06-11 NOTE — ASSESSMENT & PLAN NOTE
Hx recurrent C diff infection. Most recently diagnosed 5/8 hospitalization. Started on PO vancomycin 125 mg q6h 5/8 - patient noncompliant     Continue with PO vancomycin  Contact precautions  Probiotics

## 2025-06-11 NOTE — ASSESSMENT & PLAN NOTE
Lab Results   Component Value Date    HGBA1C 5.2 05/05/2025       Recent Labs     06/10/25  1059 06/10/25  1548 06/10/25  2050 06/11/25  0720   POCGLU 144* 122 131 118       Blood Sugar Average: Last 72 hrs:  (P) 132.1216518929557724    Chronic, unstable.  Home meds: Gabapentin 300mg TID, Metformin 500mg daily  Hypoglycemic initially likely secondary to poor PO intake    Hold metformin  Continue gabapentin  ISS  Hypoglycemia protocol  Carb controlled diet

## 2025-06-11 NOTE — ASSESSMENT & PLAN NOTE
Presented to ED after alcohol intoxication and fall. EKG showing A-fib with RVR. Patient grossly noncompliant with home meds   Home meds: Eliquis 5mg BID, metoprolol 100mg BID, Cardizem 120mg BID    -Admission EKG A-fib w/RVR, rate 166  -s/p Cardizem drip    ED course: IV cardizem 10mgx1, D5NS, cardizem drip, IV mag 2gx1    Continue with metoprolol 100 mg p.o. twice daily, Cardizem 120 mg p.o. twice daily.   Resume home eliquis 5 mg BID  Risk vs benefit discussion prior to DC

## 2025-06-11 NOTE — PROGRESS NOTES
Brief visit with pt who engages in pleasant conversation. Introduced self and role, offered supportive conversation with pt who denies any specific needs at this time. Visit was met with gratitude.   Available to follow upon request.   Thank you!       06/11/25 1100   Clinical Encounter Type   Visited With Patient   Routine Visit Introduction

## 2025-06-11 NOTE — PHYSICAL THERAPY NOTE
PT TREATMENT       06/11/25 0020   PT Last Visit   PT Visit Date 06/11/25   Note Type   Note Type Treatment   Pain Assessment   Pain Assessment Tool 0-10   Pain Score No Pain   Patient's Stated Pain Goal No pain   Multiple Pain Sites No   Restrictions/Precautions   Weight Bearing Precautions Per Order No   Other Precautions Bed Alarm;Chair Alarm;Fall Risk;Pain   General   Chart Reviewed Yes   Family/Caregiver Present No   Cognition   Arousal/Participation Cooperative   Orientation Level Oriented to person;Oriented to place;Oriented to time;Oriented to situation   Following Commands Follows multistep commands with increased time or repetition   Subjective   Subjective Pt agreeable to PT session this afternoon   Bed Mobility   Supine to Sit Unable to assess  (received sitting at EOB)   Sit to Supine 7  Independent   Transfers   Sit to Stand 5  Supervision   Additional items Verbal cues   Stand to Sit 5  Supervision   Additional items Verbal cues   Ambulation/Elevation   Gait pattern Foward flexed;Short stride;Step through pattern  (decreased gait speed ; intermittent standing rest break due to fatigue/dizziness)   Gait Assistance 5  Supervision   Additional items Verbal cues   Assistive Device Rolling walker   Distance 100 feet with change of direction   Stair Management Assistance Not tested  (pt reports no steps at home)   Ambulation/Elevation Additional Comments Pt reports dizziness when ambulating - return to sitting at EOB and BP assessed 112/74   Balance   Static Sitting Good   Dynamic Sitting Fair +   Static Standing Fair   Dynamic Standing Fair   Ambulatory Fair -  (RW)   Activity Tolerance   Activity Tolerance Patient tolerated treatment well;Patient limited by fatigue   Assessment   Prognosis Good   Problem List Decreased strength;Impaired balance;Decreased mobility;Decreased cognition;Impaired judgement;Decreased safety awareness   Assessment Pt seen for PT session this afternoon.Pt making good progress  towards IP PT goals. Able to progress to ambulate x increased distance with supervision using RW. Pt required intermittent verbal cues for hand placement on walker + required 1-2 brief standing rest breaks due to fatigue/dizziness. Upon return to room pt able to transfer to chair and then bed with supervision. Recommendation updated to level 3 minimum resource intensity.  The patient's AM-[facility] Basic Mobility Inpatient Short Form Raw Score is 19. A Raw score of greater than 16 suggests the patient may benefit from discharge to home. Please also refer to the recommendation of the Physical Therapist for safe discharge planning.     Goals   Patient Goals to go home   Plan   Treatment/Interventions LE strengthening/ROM;Functional transfer training;ADL retraining;Therapeutic exercise;Endurance training;Gait training;Spoke to nursing   Progress Progressing toward goals   PT Frequency 3-5x/wk   Discharge Recommendation   Rehab Resource Intensity Level, PT III (Minimum Resource Intensity)   AM-PAC Basic Mobility Inpatient   Turning in Flat Bed Without Bedrails 4   Lying on Back to Sitting on Edge of Flat Bed Without Bedrails 4   Moving Bed to Chair 3   Standing Up From Chair Using Arms 3   Walk in Room 3   Climb 3-5 Stairs With Railing 2   Basic Mobility Inpatient Raw Score 19   Basic Mobility Standardized Score 42.48   [facility] Highest Level Of Mobility   JH-HLM Goal 6: Walk 10 steps or more   JH-HLM Achieved 7: Walk 25 feet or more   Education   Education Provided Mobility training;Assistive device   Patient Demonstrates verbal understanding;Reinforcement needed   End of Consult   Patient Position at End of Consult Supine;Bed/Chair alarm activated;All needs within reach   Licensure   [place] License Number  Ursula Anne [id number]

## 2025-06-11 NOTE — OCCUPATIONAL THERAPY NOTE
"OT TREATMENT       06/11/25 1545   Note Type   Note Type Cancelled Session   Cancel Reasons Refusal  (pt refused reporting \"I'm too tired now\".  Will follow.)   Licensure   [place] License Number  Ursula Reyes MS OTR/L [id number]       "

## 2025-06-11 NOTE — ASSESSMENT & PLAN NOTE
No home O2 documented at baseline  Presented with hypoxia requiring midflow 12 L NC > 4 L NC   6/6 had increased O2 requirement up to 12L NC midflow   In setting of mucus plugging and ? aspiration  Currently at baseline on RA with SpO2 > 90%    Plan:  Ambulatory pulse ox prior to DC  Rest of plan per opacity of lung

## 2025-06-11 NOTE — PROGRESS NOTES
Progress Note - Family Medicine   Name: Gregg Partida 65 y.o. male I MRN: [id number]  Unit/Bed#: 2 [address] Date of Admission: 6/5/2025   Date of Service: 6/11/2025 I Hospital Day: 6    Assessment & Plan  Acute respiratory failure with hypoxia (HCC)  No home O2 documented at baseline  Presented with hypoxia requiring midflow 12 L NC > 4 L NC   6/6 had increased O2 requirement up to 12L NC midflow   In setting of mucus plugging and ? aspiration  Currently at baseline on RA with SpO2 > 90%    Plan:  Ambulatory pulse ox prior to DC  Rest of plan per opacity of lung  Opacity of lung on imaging study  6/7 CXR: left hemithorax complete opacification   6/8 CXR: Partial reexpansion of the left lung with probable small left pleural effusion. Persistent groundglass opacity in the right lung.  6/9 CXR: Significant interval improvement of pulmonary alveolar edema  6/10 CXR: Persistent mild pulmonary venous congestion with probable left base atelectasis    Plan:  Pulm consulted, continue to appreciate recs   Recommend CXR on OP basis to assess resolution  Chest PT  Hypertonic saline nebs + albuterol neb   Incentive spirometer  Alcohol abuse  Last drink morning of ED visit. Does not use home medication Naltrexone 50mg daily. Ground level fall on afternoon of initial presentation.    POA CIWA 9, CIWA is trending down during course of admission.    -Initial EtOH: 346  -CT head: No acute intracranial abnormality  -CT spine: No cervical spine fracture or traumatic malalignment    6/7 : Worsening CIWA score, last CIWA 32 at 10 AM. Transferred to ICU for phenobarbital.    Continue CIWA protocol  Thiamine, folic acid, multivitamin  Seizure, fall, aspiration precautions  Hold home Naltrexone  Consider resuming on DC  PT/OT  Acute encephalopathy  Noted to have episodes of waxing and waning mentation during course of admission.   Extensive hx of alcohol abuse, recent withdrawal, medication non compliance, recurrent hospitalization,  extended hospital stay, recurrent falls at home   CT head wo contrast: chronic microangiopathic changes, chronic nasal bone fx  B12, folate WNL  Ammonia WNL     Plan:  Pending Vit D  Delirium precautions   Consider MRI brain if worsening  Atrial fibrillation with RVR (HCC)  Presented to ED after alcohol intoxication and fall. EKG showing A-fib with RVR. Patient grossly noncompliant with home meds   Home meds: Eliquis 5mg BID, metoprolol 100mg BID, Cardizem 120mg BID    -Admission EKG A-fib w/RVR, rate 166  -s/p Cardizem drip    ED course: IV cardizem 10mgx1, D5NS, cardizem drip, IV mag 2gx1    Continue with metoprolol 100 mg p.o. twice daily, Cardizem 120 mg p.o. twice daily.   Resume home eliquis 5 mg BID  Risk vs benefit discussion prior to DC  Elevated troponin  Troponin 70>173>192.    Non-MI troponin elevation in setting of A-fib with RVR.  History of prostate cancer  Chronic, stable. S/p resection 2021.    Home meds Flomax 0.4mg    Continue home med  Recurrent Clostridioides difficile diarrhea  Hx recurrent C diff infection. Most recently diagnosed 5/8 hospitalization. Started on PO vancomycin 125 mg q6h 5/8 - patient noncompliant     Continue with PO vancomycin  Contact precautions  Probiotics  Fall  Ground level fall on afternoon of ED visit. Denies preceding headache, dizziness, lightheadedness  Multiple falls due to drinking alcohol in the past.    CT head and spine unremarkable.    Fall precautions  PT/OT  Type 2 diabetes mellitus, without long-term current use of insulin ([facility])  Lab Results   Component Value Date    HGBA1C 5.2 05/05/2025       Recent Labs     06/10/25  1059 06/10/25  1548 06/10/25  2050 06/11/25  0720   POCGLU 144* 122 131 118       Blood Sugar Average: Last 72 hrs:  (P) [id number]    Chronic, unstable.  Home meds: Gabapentin 300mg TID, Metformin 500mg daily  Hypoglycemic initially likely secondary to poor PO intake    Hold metformin  Continue gabapentin  ISS  Hypoglycemia  protocol  Carb controlled diet  COPD (chronic obstructive pulmonary disease) (HCC)  Chronic, stable.    Home meds: Breo-Elipta daily    Continue breo and nebulizers   Electrolyte abnormality  Initial labs notable for  Mg 1.2: repleted w/ IV mag 2g x1 while in ED.     Monitor and replete as indicated  CAD (coronary artery disease)  CAD s/p CABG in 2004.   Home medications: ASA 81 mg, Ranexa 500 mg BID,   No chest pain endorsed at time of initial assessment.     Aspirin daily  Continue Ranexa  Thrombocytopenia (HCC)  Noted history of thrombocytopenia.   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  No active signs of bleeding at time of examination.     Continue anticoagulation with Eliquis in setting of A-fib w/ RVR  Continue aspirin  Monitor for signs of bleeding  Transaminitis  Initial labs notable for: , ALT 65,   Suspect secondary to acute on chronic alcohol use.     Improving  6/7 : AST 61, ALT 43,     Continue with Lipitor  Continue to monitor   Chronic heart failure with preserved ejection fraction (HCC)  Chronic, stable. Patient appears euvolemic on initial assessment.     -Initial labs notable for:     Monitor fluid status    Wt Readings from Last 3 Encounters:   06/10/25 80 kg (176 lb 5.9 oz)   05/28/25 76 kg (167 lb 8.8 oz)   05/13/25 79.1 kg (174 lb 4.8 oz)     Hyponatremia  Na 130-131 throughout admission, 135  Urine osm 360, urine Na 67, serum osm 285  Hold IVF  S/p lasix PO 20 mg x 1  Start 1800 cc fluid restriction  F/u BMP     VTE Pharmacologic Prophylaxis: VTE Score: 4 High Risk (Score >/= 5) - Pharmacological DVT Prophylaxis Ordered: apixaban (Eliquis). Sequential Compression Devices Ordered.    Mobility:   Basic Mobility Inpatient Raw Score: 14  -HLM Goal: 4: Move to chair/commode  JH-HLM Achieved: 4: Move to chair/commode  -HLM Goal NOT achieved. Continue with multidisciplinary rounding and encourage appropriate mobility to improve upon -HLM  goals.    Patient Centered Rounds: I performed bedside rounds with nursing staff today.   Discussions with Specialists or Other Care Team Provider: Pulmonology    Education and Discussions with Family / Patient: Patient declined call to .     Current Length of Stay: 6 day(s)  Current Patient Status: Inpatient   Certification Statement: The patient will continue to require additional inpatient hospital stay due to encephalopathy  Discharge Plan: Anticipate discharge in 24-48 hrs to home.    Code Status: Level 1 - Full Code    Subjective   Patient examined at bedside, AO x 3, noting breathing has improved. Reporting he would like to work with PT/OT as he still feels weak.     Objective :  Temp:  [96.6 °F (35.9 °C)-98.6 °F (37 °C)] 98.6 °F (37 °C)  HR:  [69-86] 69  BP: (122-133)/(76-82) 122/76  Resp:  [18-22] 22  SpO2:  [95 %-96 %] 95 %  O2 Device: None (Room air)    Body mass index is 22.64 kg/m².     Input and Output Summary (last 24 hours):     Intake/Output Summary (Last 24 hours) at 6/11/2025 0841  Last data filed at 6/11/2025 0201  Gross per 24 hour   Intake 1202.5 ml   Output 1050 ml   Net 152.5 ml       Physical Exam  Vitals reviewed.   Constitutional:       General: He is not in acute distress.     Appearance: Normal appearance.   HENT:      Head: Normocephalic and atraumatic.      Right Ear: External ear normal.      Left Ear: External ear normal.      Nose: Nose normal.      Mouth/Throat:      Mouth: Mucous membranes are moist.      Pharynx: Oropharynx is clear.     Eyes:      Extraocular Movements: Extraocular movements intact.       Cardiovascular:      Rate and Rhythm: Normal rate. Rhythm irregular.      Pulses: Normal pulses.      Heart sounds: Normal heart sounds.   Pulmonary:      Breath sounds: No wheezing.      Comments: Decreased air movement b/l.  Abdominal:      General: Abdomen is flat. There is no distension.      Palpations: Abdomen is soft.      Tenderness: There is no abdominal  tenderness. There is no guarding.     Musculoskeletal:         General: No tenderness. Normal range of motion.      Right lower leg: No edema.      Left lower leg: No edema.     Skin:     General: Skin is warm and dry.      Capillary Refill: Capillary refill takes less than 2 seconds.     Neurological:      General: No focal deficit present.      Mental Status: He is alert.      Comments: Oriented to self and place only.   Psychiatric:         Mood and Affect: Mood normal.         Behavior: Behavior normal.       Lines/Drains:              Lab Results: I have reviewed the following results:   Results from last 7 days   Lab Units 06/11/25  0603   WBC Thousand/uL 4.86   HEMOGLOBIN g/dL 8.8*   HEMATOCRIT % 28.0*   PLATELETS Thousands/uL 81*   SEGS PCT % 64   LYMPHO PCT % 18   MONO PCT % 16*   EOS PCT % 1     Results from last 7 days   Lab Units 06/11/25  0603 06/10/25  1904 06/10/25  0539   SODIUM mmol/L 131*   < > 130*   POTASSIUM mmol/L 4.3   < > 4.3   CHLORIDE mmol/L 97   < > 97   CO2 mmol/L 27   < > 26   BUN mg/dL 18   < > 18   CREATININE mg/dL 1.20   < > 1.34*   ANION GAP mmol/L 7   < > 7   CALCIUM mg/dL 8.5   < > 8.3*   ALBUMIN g/dL  --   --  3.2*   TOTAL BILIRUBIN mg/dL  --   --  0.92   ALK PHOS U/L  --   --  156*   ALT U/L  --   --  55*   AST U/L  --   --  70*   GLUCOSE RANDOM mg/dL 121   < > 125    < > = values in this interval not displayed.         Results from last 7 days   Lab Units 06/11/25  0720 06/10/25  2050 06/10/25  1548 06/10/25  1059 06/10/25  0739 06/09/25  2047 06/09/25  1600 06/09/25  1049 06/09/25  0636 06/08/25  2109 06/08/25  1623 06/08/25  1113   POC GLUCOSE mg/dl 118 131 122 144* 115 148* 124 121 130 175* 192* 113               Recent Cultures (last 7 days):               Last 24 Hours Medication List:     Current Facility-Administered Medications:     albuterol inhalation solution 2.5 mg, TID **AND** [COMPLETED] sodium chloride 3 % inhalation solution 4 mL, Q8H    apixaban (ELIQUIS) tablet  5 mg, BID    aspirin chewable tablet 81 mg, Daily    atorvastatin (LIPITOR) tablet 40 mg, Daily    diltiazem (CARDIZEM SR) 12 hr capsule 120 mg, BID    ferrous sulfate tablet 325 mg, Daily With Breakfast    fluticasone-vilanterol 200-25 mcg/actuation 1 puff, Daily    guaiFENesin (MUCINEX) 12 hr tablet 600 mg, Q12H SEDA    insulin lispro (HumALOG/ADMELOG) 100 units/mL subcutaneous injection 1-5 Units, HS    insulin lispro (HumALOG/ADMELOG) 100 units/mL subcutaneous injection 1-5 Units, TID AC **AND** Fingerstick Glucose (POCT), 4x Daily AC and at bedtime    melatonin tablet 6 mg, HS PRN    metoprolol succinate (TOPROL-XL) 24 hr tablet 100 mg, BID    multivitamin-minerals (CENTRUM) tablet 1 tablet, Daily    nicotine (NICODERM CQ) 21 mg/24 hr TD 24 hr patch 1 patch, Q24H    pantoprazole (PROTONIX) EC tablet 40 mg, Daily    ranolazine (RANEXA) 12 hr tablet 500 mg, BID    saccharomyces boulardii (FLORASTOR) capsule 250 mg, BID    sodium chloride 3 % inhalation solution 4 mL, Q8H    tamsulosin (FLOMAX) capsule 0.4 mg, Daily With Dinner    vancomycin (VANCOCIN) capsule 125 mg, Q6H SEDA    Administrative Statements   Today, Patient Was Seen By: Mary Ann Stockton DO      **Please Note: This note may have been constructed using a voice recognition system.**

## 2025-06-11 NOTE — ASSESSMENT & PLAN NOTE
Last drink morning of ED visit. Does not use home medication Naltrexone 50mg daily. Ground level fall on afternoon of initial presentation.    POA CIWA 9, CIWA is trending down during course of admission.    -Initial EtOH: 346  -CT head: No acute intracranial abnormality  -CT spine: No cervical spine fracture or traumatic malalignment    6/7 : Worsening CIWA score, last CIWA 32 at 10 AM. Transferred to ICU for phenobarbital.    Continue CIWA protocol  Thiamine, folic acid, multivitamin  Seizure, fall, aspiration precautions  Hold home Naltrexone  Consider resuming on DC  PT/OT

## 2025-06-11 NOTE — ASSESSMENT & PLAN NOTE
Na 130-131 throughout admission, 135  Urine osm 360, urine Na 67, serum osm 285  Hold IVF  S/p lasix PO 20 mg x 1  Start 1800 cc fluid restriction  F/u BMP

## 2025-06-11 NOTE — ASSESSMENT & PLAN NOTE
Noted to have episodes of waxing and waning mentation during course of admission.   Extensive hx of alcohol abuse, recent withdrawal, medication non compliance, recurrent hospitalization, extended hospital stay, recurrent falls at home   CT head wo contrast: chronic microangiopathic changes, chronic nasal bone fx  B12, folate WNL  Ammonia WNL     Plan:  Pending Vit D  Delirium precautions   Consider MRI brain if worsening

## 2025-06-12 ENCOUNTER — PATIENT OUTREACH (OUTPATIENT)
Age: 65
End: 2025-06-12

## 2025-06-12 VITALS
WEIGHT: 176.37 LBS | RESPIRATION RATE: 18 BRPM | BODY MASS INDEX: 22.63 KG/M2 | TEMPERATURE: 100 F | HEART RATE: 88 BPM | OXYGEN SATURATION: 95 % | SYSTOLIC BLOOD PRESSURE: 152 MMHG | HEIGHT: 74 IN | DIASTOLIC BLOOD PRESSURE: 86 MMHG

## 2025-06-12 PROBLEM — J96.01 ACUTE RESPIRATORY FAILURE WITH HYPOXIA (HCC): Status: RESOLVED | Noted: 2024-12-07 | Resolved: 2025-06-12

## 2025-06-12 PROBLEM — R79.89 ELEVATED TROPONIN: Status: RESOLVED | Noted: 2023-09-28 | Resolved: 2025-06-12

## 2025-06-12 PROBLEM — G93.40 ACUTE ENCEPHALOPATHY: Status: RESOLVED | Noted: 2025-06-07 | Resolved: 2025-06-12

## 2025-06-12 LAB
ANION GAP SERPL CALCULATED.3IONS-SCNC: 7 MMOL/L (ref 4–13)
BASOPHILS # BLD AUTO: 0.05 THOUSANDS/ÂΜL (ref 0–0.1)
BASOPHILS NFR BLD AUTO: 1 % (ref 0–1)
BUN SERPL-MCNC: 15 MG/DL (ref 5–25)
CALCIUM SERPL-MCNC: 8.6 MG/DL (ref 8.4–10.2)
CHLORIDE SERPL-SCNC: 99 MMOL/L (ref 96–108)
CO2 SERPL-SCNC: 27 MMOL/L (ref 21–32)
CREAT SERPL-MCNC: 1.08 MG/DL (ref 0.6–1.3)
EOSINOPHIL # BLD AUTO: 0.05 THOUSAND/ÂΜL (ref 0–0.61)
EOSINOPHIL NFR BLD AUTO: 1 % (ref 0–6)
ERYTHROCYTE [DISTWIDTH] IN BLOOD BY AUTOMATED COUNT: 17.2 % (ref 11.6–15.1)
GFR SERPL CREATININE-BSD FRML MDRD: 72 ML/MIN/1.73SQ M
GLUCOSE SERPL-MCNC: 117 MG/DL (ref 65–140)
GLUCOSE SERPL-MCNC: 122 MG/DL (ref 65–140)
GLUCOSE SERPL-MCNC: 124 MG/DL (ref 65–140)
HCT VFR BLD AUTO: 27.9 % (ref 36.5–49.3)
HGB BLD-MCNC: 9 G/DL (ref 12–17)
IMM GRANULOCYTES # BLD AUTO: 0.04 THOUSAND/UL (ref 0–0.2)
IMM GRANULOCYTES NFR BLD AUTO: 1 % (ref 0–2)
LYMPHOCYTES # BLD AUTO: 0.72 THOUSANDS/ÂΜL (ref 0.6–4.47)
LYMPHOCYTES NFR BLD AUTO: 16 % (ref 14–44)
MCH RBC QN AUTO: 31.3 PG (ref 26.8–34.3)
MCHC RBC AUTO-ENTMCNC: 32.3 G/DL (ref 31.4–37.4)
MCV RBC AUTO: 97 FL (ref 82–98)
MONOCYTES # BLD AUTO: 1.03 THOUSAND/ÂΜL (ref 0.17–1.22)
MONOCYTES NFR BLD AUTO: 22 % (ref 4–12)
NEUTROPHILS # BLD AUTO: 2.74 THOUSANDS/ÂΜL (ref 1.85–7.62)
NEUTS SEG NFR BLD AUTO: 59 % (ref 43–75)
NRBC BLD AUTO-RTO: 0 /100 WBCS
PLATELET # BLD AUTO: 110 THOUSANDS/UL (ref 149–390)
PMV BLD AUTO: 11.7 FL (ref 8.9–12.7)
POTASSIUM SERPL-SCNC: 3.8 MMOL/L (ref 3.5–5.3)
RBC # BLD AUTO: 2.88 MILLION/UL (ref 3.88–5.62)
SODIUM SERPL-SCNC: 133 MMOL/L (ref 135–147)
WBC # BLD AUTO: 4.63 THOUSAND/UL (ref 4.31–10.16)

## 2025-06-12 PROCEDURE — 94640 AIRWAY INHALATION TREATMENT: CPT

## 2025-06-12 PROCEDURE — 94761 N-INVAS EAR/PLS OXIMETRY MLT: CPT

## 2025-06-12 PROCEDURE — 99239 HOSP IP/OBS DSCHRG MGMT >30: CPT | Performed by: INTERNAL MEDICINE

## 2025-06-12 PROCEDURE — 94669 MECHANICAL CHEST WALL OSCILL: CPT

## 2025-06-12 PROCEDURE — 82948 REAGENT STRIP/BLOOD GLUCOSE: CPT

## 2025-06-12 PROCEDURE — 94760 N-INVAS EAR/PLS OXIMETRY 1: CPT

## 2025-06-12 PROCEDURE — 94664 DEMO&/EVAL PT USE INHALER: CPT

## 2025-06-12 PROCEDURE — 80048 BASIC METABOLIC PNL TOTAL CA: CPT

## 2025-06-12 PROCEDURE — 94668 MNPJ CHEST WALL SBSQ: CPT

## 2025-06-12 PROCEDURE — 85025 COMPLETE CBC W/AUTO DIFF WBC: CPT

## 2025-06-12 RX ORDER — GABAPENTIN 300 MG/1
300 CAPSULE ORAL 3 TIMES DAILY
Qty: 90 CAPSULE | Refills: 0 | Status: SHIPPED | OUTPATIENT
Start: 2025-06-12

## 2025-06-12 RX ORDER — DILTIAZEM HYDROCHLORIDE 120 MG/1
120 CAPSULE, EXTENDED RELEASE ORAL 2 TIMES DAILY
Qty: 60 CAPSULE | Refills: 0 | Status: SHIPPED | OUTPATIENT
Start: 2025-06-12

## 2025-06-12 RX ORDER — FOLIC ACID 0.4 MG
400 TABLET ORAL DAILY
Qty: 30 TABLET | Refills: 0 | Status: SHIPPED | OUTPATIENT
Start: 2025-06-12

## 2025-06-12 RX ORDER — ASPIRIN 81 MG/1
81 TABLET, CHEWABLE ORAL DAILY
Qty: 30 TABLET | Refills: 0 | Status: SHIPPED | OUTPATIENT
Start: 2025-06-12

## 2025-06-12 RX ORDER — RANOLAZINE 500 MG/1
500 TABLET, EXTENDED RELEASE ORAL 2 TIMES DAILY
Qty: 60 TABLET | Refills: 0 | Status: SHIPPED | OUTPATIENT
Start: 2025-06-12

## 2025-06-12 RX ORDER — ATORVASTATIN CALCIUM 40 MG/1
40 TABLET, FILM COATED ORAL DAILY
Qty: 30 TABLET | Refills: 0 | Status: SHIPPED | OUTPATIENT
Start: 2025-06-12

## 2025-06-12 RX ORDER — GAUZE BANDAGE 2" X 2"
100 BANDAGE TOPICAL DAILY
Start: 2025-06-12

## 2025-06-12 RX ORDER — METOPROLOL SUCCINATE 100 MG/1
100 TABLET, EXTENDED RELEASE ORAL DAILY
Qty: 30 TABLET | Refills: 0 | Status: SHIPPED | OUTPATIENT
Start: 2025-06-12

## 2025-06-12 RX ORDER — ALBUTEROL SULFATE 90 UG/1
2 INHALANT RESPIRATORY (INHALATION) EVERY 6 HOURS PRN
Qty: 6.7 G | Refills: 0 | Status: SHIPPED | OUTPATIENT
Start: 2025-06-12

## 2025-06-12 RX ORDER — TAMSULOSIN HYDROCHLORIDE 0.4 MG/1
0.4 CAPSULE ORAL
Qty: 30 CAPSULE | Refills: 0 | Status: SHIPPED | OUTPATIENT
Start: 2025-06-12

## 2025-06-12 RX ORDER — TORSEMIDE 10 MG/1
10 TABLET ORAL EVERY OTHER DAY
Qty: 15 TABLET | Refills: 0 | Status: SHIPPED | OUTPATIENT
Start: 2025-06-12 | End: 2025-07-12

## 2025-06-12 RX ORDER — GUAIFENESIN 600 MG/1
600 TABLET, EXTENDED RELEASE ORAL EVERY 12 HOURS SCHEDULED
Qty: 28 TABLET | Refills: 0 | Status: SHIPPED | OUTPATIENT
Start: 2025-06-12 | End: 2025-06-26

## 2025-06-12 RX ORDER — VANCOMYCIN HYDROCHLORIDE 125 MG/1
125 CAPSULE ORAL EVERY 6 HOURS SCHEDULED
Qty: 28 CAPSULE | Refills: 0 | Status: SHIPPED | OUTPATIENT
Start: 2025-06-12 | End: 2025-06-19

## 2025-06-12 RX ORDER — ALBUTEROL SULFATE 0.83 MG/ML
2.5 SOLUTION RESPIRATORY (INHALATION)
Qty: 270 ML | Refills: 0 | Status: SHIPPED | OUTPATIENT
Start: 2025-06-12

## 2025-06-12 RX ORDER — SODIUM CHLORIDE FOR INHALATION 3 %
4 VIAL, NEBULIZER (ML) INHALATION EVERY 8 HOURS
Qty: 16 ML | Refills: 0 | Status: SHIPPED | OUTPATIENT
Start: 2025-06-12 | End: 2025-06-14

## 2025-06-12 RX ORDER — FLUTICASONE FUROATE AND VILANTEROL 200; 25 UG/1; UG/1
1 POWDER RESPIRATORY (INHALATION) DAILY
Qty: 60 EACH | Refills: 0 | Status: SHIPPED | OUTPATIENT
Start: 2025-06-12

## 2025-06-12 RX ORDER — PANTOPRAZOLE SODIUM 40 MG/1
40 TABLET, DELAYED RELEASE ORAL DAILY
Qty: 30 TABLET | Refills: 0 | Status: SHIPPED | OUTPATIENT
Start: 2025-06-12

## 2025-06-12 RX ORDER — SACCHAROMYCES BOULARDII 250 MG
250 CAPSULE ORAL 2 TIMES DAILY
Qty: 60 CAPSULE | Refills: 0 | Status: SHIPPED | OUTPATIENT
Start: 2025-06-12

## 2025-06-12 RX ORDER — FERROUS SULFATE 325(65) MG
325 TABLET ORAL
Qty: 30 TABLET | Refills: 0 | Status: SHIPPED | OUTPATIENT
Start: 2025-06-12

## 2025-06-12 RX ADMIN — SODIUM CHLORIDE SOLN NEBU 3% 4 ML: 3 NEBU SOLN at 07:04

## 2025-06-12 RX ADMIN — B-COMPLEX W/ C & FOLIC ACID TAB 1 TABLET: TAB at 08:25

## 2025-06-12 RX ADMIN — RANOLAZINE 500 MG: 500 TABLET, FILM COATED, EXTENDED RELEASE ORAL at 08:26

## 2025-06-12 RX ADMIN — METOPROLOL SUCCINATE 100 MG: 100 TABLET, EXTENDED RELEASE ORAL at 08:25

## 2025-06-12 RX ADMIN — PANTOPRAZOLE SODIUM 40 MG: 40 TABLET, DELAYED RELEASE ORAL at 08:26

## 2025-06-12 RX ADMIN — ASPIRIN 81 MG: 81 TABLET, CHEWABLE ORAL at 08:26

## 2025-06-12 RX ADMIN — FLUTICASONE FUROATE AND VILANTEROL TRIFENATATE 1 PUFF: 200; 25 POWDER RESPIRATORY (INHALATION) at 08:28

## 2025-06-12 RX ADMIN — Medication 250 MG: at 08:26

## 2025-06-12 RX ADMIN — DILTIAZEM HYDROCHLORIDE 120 MG: 60 CAPSULE, EXTENDED RELEASE ORAL at 08:25

## 2025-06-12 RX ADMIN — FERROUS SULFATE TAB 325 MG (65 MG ELEMENTAL FE) 325 MG: 325 (65 FE) TAB at 08:26

## 2025-06-12 RX ADMIN — VANCOMYCIN HYDROCHLORIDE 125 MG: 125 CAPSULE ORAL at 05:05

## 2025-06-12 RX ADMIN — ALBUTEROL SULFATE 2.5 MG: 2.5 SOLUTION RESPIRATORY (INHALATION) at 07:04

## 2025-06-12 RX ADMIN — GUAIFENESIN 600 MG: 600 TABLET, EXTENDED RELEASE ORAL at 08:26

## 2025-06-12 RX ADMIN — ATORVASTATIN CALCIUM 40 MG: 40 TABLET, FILM COATED ORAL at 08:26

## 2025-06-12 RX ADMIN — APIXABAN 5 MG: 5 TABLET, FILM COATED ORAL at 08:26

## 2025-06-12 RX ADMIN — VANCOMYCIN HYDROCHLORIDE 125 MG: 125 CAPSULE ORAL at 12:19

## 2025-06-12 RX ADMIN — THIAMINE HCL TAB 100 MG 100 MG: 100 TAB at 08:26

## 2025-06-12 NOTE — ASSESSMENT & PLAN NOTE
Chronic, stable. Patient appears euvolemic on initial assessment.     -Initial labs notable for:     Monitor fluid status  Discussed 1800 cc fluid restriction    Wt Readings from Last 3 Encounters:   06/10/25 80 kg (176 lb 5.9 oz)   05/28/25 76 kg (167 lb 8.8 oz)   05/13/25 79.1 kg (174 lb 4.8 oz)

## 2025-06-12 NOTE — PROGRESS NOTES
Patient due for outreach today.    SWCM completed brief chart review and notes patient remains hospitalized at [facility]. Recommendation updated from STR to Level III, as per PT notes. SWCM to anticipate discharge to home once medically optimized.    SWCM to attempt outreach post discharge.   cont. ACE-I, low-salt diet

## 2025-06-12 NOTE — ASSESSMENT & PLAN NOTE
Noted to have episodes of waxing and waning mentation during course of admission.   Extensive hx of alcohol abuse, recent withdrawal, medication non compliance, recurrent hospitalization, extended hospital stay, recurrent falls at home   CT head wo contrast: chronic microangiopathic changes, chronic nasal bone fx  B12, folate WNL  Ammonia WNL     Plan:  Pending Vit D

## 2025-06-12 NOTE — DISCHARGE SUMMARY
Discharge Summary - Family Medicine   Name: Gregg Partida 65 y.o. male I MRN: [id number]  Unit/Bed#: 2 [address] Date of Admission: 6/5/2025   Date of Service: 6/12/2025 I Hospital Day: 7     Assessment & Plan  Acute respiratory failure with hypoxia (HCC) (Resolved: 6/12/2025)  No home O2 documented at baseline  Presented with hypoxia requiring midflow 12 L NC > 4 L NC   6/6 had increased O2 requirement up to 12L NC midflow   In setting of mucus plugging and ? aspiration  Currently at baseline on RA with SpO2 > 90%    Plan:  Rest of plan per opacity of lung  Opacity of lung on imaging study  6/7 CXR: left hemithorax complete opacification   6/8 CXR: Partial reexpansion of the left lung with probable small left pleural effusion. Persistent groundglass opacity in the right lung.  6/9 CXR: Significant interval improvement of pulmonary alveolar edema  6/10 CXR: Persistent mild pulmonary venous congestion with probable left base atelectasis    Plan:  Follow up with pulm OP, ambulatory referral placed   Repeat CXR in one week  Chest PT   Continue nebulizer on outpatient basis   Incentive spirometer  Alcohol abuse  Last drink morning of ED visit. Does not use home medication Naltrexone 50mg daily. Ground level fall on afternoon of initial presentation.    POA CIWA 9, CIWA is trending down during course of admission.    -Initial EtOH: 346  -CT head: No acute intracranial abnormality  -CT spine: No cervical spine fracture or traumatic malalignment    6/7 : Worsening CIWA score, last CIWA 32 at 10 AM. Transferred to ICU for phenobarbital.    Thiamine, folic acid, multivitamin  Discontinue naltrexone  Recommend outpatient follow up  Patient reporting he is interested in OP rehab but declines referral by CM   Acute encephalopathy (Resolved: 6/12/2025)  Noted to have episodes of waxing and waning mentation during course of admission.   Extensive hx of alcohol abuse, recent withdrawal, medication non compliance, recurrent  hospitalization, extended hospital stay, recurrent falls at home   CT head wo contrast: chronic microangiopathic changes, chronic nasal bone fx  B12, folate WNL  Ammonia WNL     Plan:  Pending Vit D  Atrial fibrillation with RVR (HCC)  Presented to ED after alcohol intoxication and fall. EKG showing A-fib with RVR. Patient grossly noncompliant with home meds   Home meds: Eliquis 5mg BID, metoprolol 100mg BID, Cardizem 120mg BID    -Admission EKG A-fib w/RVR, rate 166  -s/p Cardizem drip    ED course: IV cardizem 10mgx1, D5NS, cardizem drip, IV mag 2gx1    Continue with metoprolol 100 mg p.o. twice daily, Cardizem 120 mg p.o. twice daily.   Resume home eliquis 5 mg BID  Extensively discussed risks of continuing eliquis in setting of frequent falls   Patient expresses understanding and wishes to continue the medication   Elevated troponin (Resolved: 6/12/2025)  Troponin 70>173>192.    Non-MI troponin elevation in setting of A-fib with RVR.  History of prostate cancer  Chronic, stable. S/p resection 2021.    Home meds Flomax 0.4mg    Continue home med  Recurrent Clostridioides difficile diarrhea  Hx recurrent C diff infection. Most recently diagnosed 5/8 hospitalization. Started on PO vancomycin 125 mg q6h 5/8 - patient noncompliant     Continue with PO vancomycin for 7 days after discharge  q6h  Contact precautions  Probiotics  Fall  Ground level fall on afternoon of ED visit. Denies preceding headache, dizziness, lightheadedness  Multiple falls due to drinking alcohol in the past.    CT head and spine unremarkable.  Type 2 diabetes mellitus, without long-term current use of insulin ([facility])  Lab Results   Component Value Date    HGBA1C 5.2 05/05/2025       Recent Labs     06/11/25  1617 06/11/25  2125 06/12/25  0738 06/12/25  1132   POCGLU 131 120 117 124       Blood Sugar Average: Last 72 hrs:  (P) [id number]    Chronic, unstable.  Home meds: Gabapentin 300mg TID, Metformin 500mg daily  Hypoglycemic initially  likely secondary to poor PO intake    Plan:  Resume home metformin  Discussed importance of maintaining carb controlled diet   COPD (chronic obstructive pulmonary disease) (HCC)  Chronic, stable.    Home meds: Breo-Elipta daily    Continue breo, albuterol,nebulizers   Electrolyte abnormality  Initial labs notable for  Mg 1.2: repleted w/ IV mag 2g x1 while in ED.     Follow up mag   CAD (coronary artery disease)  CAD s/p CABG in 2004.   Home medications: ASA 81 mg, Ranexa 500 mg BID,   No chest pain endorsed at time of initial assessment.     Aspirin daily  Continue Ranexa  Thrombocytopenia (HCC)  Noted history of thrombocytopenia.   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  No active signs of bleeding at time of examination.     Continue anticoagulation with Eliquis in setting of A-fib w/ RVR  Continue aspirin  Monitor for signs of bleeding  Transaminitis  Initial labs notable for: , ALT 65,   Suspect secondary to acute on chronic alcohol use.     Improving  6/7 : AST 61, ALT 43,     Continue with Lipitor  Continue to monitor   Chronic heart failure with preserved ejection fraction (HCC)  Chronic, stable. Patient appears euvolemic on initial assessment.     -Initial labs notable for:     Monitor fluid status  Discussed 1800 cc fluid restriction    Wt Readings from Last 3 Encounters:   06/10/25 80 kg (176 lb 5.9 oz)   05/28/25 76 kg (167 lb 8.8 oz)   05/13/25 79.1 kg (174 lb 4.8 oz)     Hyponatremia  Na 130-131 throughout admission, 135  Urine osm 360, urine Na 67, serum osm 285  Start torsemide 10 mg QD  Start 1800 cc fluid restriction  F/u BMP  in one week     Medical Problems       Resolved Problems  Date Reviewed: 6/7/2025          Resolved    Alcohol intoxication (HCC) 6/9/2025     Resolved by  Ishmael Presley DO    Overview Signed 6/5/2025 10:39 PM by Patience Renee MD   >>OVERVIEW FOR ALCOHOL WITHDRAWAL (HCC) WRITTEN ON 11/17/2022  2:01 PM BY THANH GOMEZ,  MD        11/17/22   Detoxed in hospital, but still does occasionally drink.          Increased anion gap 6/7/2025     Resolved by  Lilliana Bliss MD    Elevated troponin 6/12/2025     Resolved by  Mary Ann Stockton DO    * (Principal) Acute respiratory failure with hypoxia (HCC) 6/12/2025     Resolved by  Mary Ann Stockton DO    Hypoglycemia 6/6/2025     Resolved by  Mary Ann Stockton DO    Acute encephalopathy 6/12/2025     Resolved by  Mary Ann Stockton DO        Discharging Physician / Practitioner: Mary Ann Stockton DO  PCP: Lilliana Bliss MD  Admission Date:   Admission Orders (From admission, onward)       Ordered        06/05/25 2320  INPATIENT ADMISSION  Once                          Discharge Date: 06/12/25    Next Steps for Physician/AP Assuming Care:  Discuss alcohol cessation  Assess interest in OP rehab  Follow up on medication compliance  Assess BMP, mag   Discuss CXR and pulmonology follow up   Assess patient falls at home   Assess mentation    Test Results Pending at Discharge (will require follow up):  BMP in one week  Vitamin D  Magnesium     Medication Changes for Discharge & Rationale:   Start torsemide 10 mg daily for hyponatremia  See after visit summary for reconciled discharge medications provided to patient and/or family.     Consultations During Hospital Stay:  Pulmonology   Critical Care    Procedures Performed:   None    Significant Findings / Test Results:   6/12: Na 133,  hgb 9, plt 110  6/11: hgb 8.8 plt 81 ammonia 28 Na 131 K 4.3   6/10: hgb 8.9 plt 66 Na 130 Cr 1.34 AST 70 ALT 55 Alk phos 156 Mg 1.9 urine Na 67.0, serum osm 360   6/9: hgb 9.0 plt 56 Na 130 ALT 75  Alk phosph 189 T bili 1.24 Mg 1.8  6/8: hgb 9.7 plt 74 Na 136 ALT 38 AST 55 Alk phosph 116 Mg 1.4 Trop 114  6/7: Hgb 8.6, Plt 70, Glu 151>111, Na 134, AST 61, Alk phos 148, Alb 3.3, T bili 1.67  6/6: WBC 6.04 Hgb 8.8 Mg 2.0 Na 143 K 3.8 Cr 0.93 AST 96 ALT 55  6/5: BG 61, AG 20, , ALT 65, , , Hb  9.7, TSH 0.487, Trp 70, 173, 192    -CT head: No acute intracranial abnormality.  -CT spine: No cervical spine fracture or traumatic malalignment  CXR 6/7: New complete opacification of the left hemithorax, atelectasis. Effusion not excluded.  CXR 6/8: Partial reexpansion of the left lung with probable small left pleural effusion. Persistent groundglass opacity in the right lung.  CXR 6/9: Significant interval improvement of the pulmonary alveolar edema.  CXR 6/10: Persistent mild pulmonary venous congestion with probable left base atelectasis.    Incidental Findings:   None     Hospital Course:   Gregg Partida is a 65 y.o. male with a PMH of A-fib, alcohol use disorder, CAD s/p CABG, CHF who was admitted on 6/6/2025 for A-fib w/ RVR and alcohol withdrawal. He has frequent admissions for similar presentations. He was found at home after a fall and alcohol intoxicated. EMS found him to be in A-fib w/ RVR and hypoglycemic.   In the ED, he was started on a Cardizem drip. As his heart rate and rhythm normalized, he was transitioned to his home oral regimen.     He was initially hypoglycemic likely secondary to poor PO intake. His sugars were closely monitored while on D5NS infusion. Once he began to tolerate PO foods and sugars stabilized closer to normal limits, his fluids were discontinued.     Patient also had worsening respiratory status. He had increased work of breathing, requiring up to 12L of oxygen. CXR showed whiteout of left lung secondary to possible mucus plugging. Patient was treated with chest physical therapy, albuterol, hypertonic nebulizer, mucinex. Pulmonology was consulted who recommended aggressive pulmonary hygiene and continued nebulizer therapy. His CXR showed significant interval improvement.     Per CIWA protocol, patient was noted to be requiring multiple doses of PO and IV Ativan. These doses were not fully reliving his withdrawal symptoms. Patient was then started on small doses of  "Librium. Due to excessive use of ativan, patient was transferred to ICU level of care for IV phenobarbital x 1. He was downgraded back to the primary service once his clinical withdrawal status stabilized. Gait was assessed and patient is stable for discharge to home.     Please see above list of diagnoses and related plan for additional information.     Discharge Day Visit / Exam:   Subjective:    Patient evaluated at bedside, noting he is feeling at baseline. Reporting he     Vitals: Blood Pressure: 152/86 (06/12/25 0823)  Pulse: 88 (06/12/25 0823)  Temperature: 100 °F (37.8 °C) (06/12/25 0823)  Temp Source: Oral (06/10/25 1956)  Respirations: 18 (06/12/25 0823)  Height: 6' 2\" (188 cm) (06/06/25 0156)  Weight - Scale: 80 kg (176 lb 5.9 oz) (06/10/25 0600)  SpO2: 95 % (06/12/25 0823)  Physical Exam  Vitals reviewed.   Constitutional:       General: He is not in acute distress.     Appearance: Normal appearance.   HENT:      Head: Normocephalic and atraumatic.      Right Ear: External ear normal.      Left Ear: External ear normal.      Nose: Nose normal.      Mouth/Throat:      Mouth: Mucous membranes are moist.      Pharynx: Oropharynx is clear.     Eyes:      Extraocular Movements: Extraocular movements intact.       Cardiovascular:      Rate and Rhythm: Normal rate. Rhythm irregular.      Pulses: Normal pulses.      Heart sounds: Normal heart sounds.   Pulmonary:      Breath sounds: No wheezing.      Comments: Decreased air movement b/l.  Abdominal:      General: Abdomen is flat. There is no distension.      Palpations: Abdomen is soft.      Tenderness: There is no abdominal tenderness. There is no guarding.     Musculoskeletal:         General: No tenderness. Normal range of motion.      Right lower leg: No edema.      Left lower leg: No edema.     Skin:     General: Skin is warm and dry.      Capillary Refill: Capillary refill takes less than 2 seconds.     Neurological:      General: No focal deficit present. "      Mental Status: He is alert.     Psychiatric:         Mood and Affect: Mood normal.         Behavior: Behavior normal.          Discussion with Family: Patient declined call to .     Discharge instructions/Information to patient and family:   See after visit summary for information provided to patient and family.      Provisions for Follow-Up Care:  See after visit summary for information related to follow-up care and any pertinent home health orders.      Mobility at time of Discharge:   Basic Mobility Inpatient Raw Score: 19  JH-HLM Goal: 6: Walk 10 steps or more  JH-HLM Achieved: 7: Walk 25 feet or more  HLM Goal achieved. Continue to encourage appropriate mobility.     Disposition:   Home    Planned Readmission: No    Administrative Statements   Discharge Statement:  I have spent a total time of >30 minutes in caring for this patient on the day of the visit/encounter. .    **Please Note: This note may have been constructed using a voice recognition system**

## 2025-06-12 NOTE — ASSESSMENT & PLAN NOTE
6/7 CXR: left hemithorax complete opacification   6/8 CXR: Partial reexpansion of the left lung with probable small left pleural effusion. Persistent groundglass opacity in the right lung.  6/9 CXR: Significant interval improvement of pulmonary alveolar edema  6/10 CXR: Persistent mild pulmonary venous congestion with probable left base atelectasis    Plan:  Follow up with pulm OP, ambulatory referral placed   Repeat CXR in one week  Chest PT   Continue nebulizer on outpatient basis   Incentive spirometer

## 2025-06-12 NOTE — ASSESSMENT & PLAN NOTE
Na 130-131 throughout admission, 135  Urine osm 360, urine Na 67, serum osm 285  Start torsemide 10 mg QD  Start 1800 cc fluid restriction  F/u BMP  in one week

## 2025-06-12 NOTE — PROGRESS NOTES
Home Oxygen Qualifying Test     Patient name: Gregg Partida        : 1960   Date of Test:  2025  Diagnosis:    Home Oxygen Test:    Medicare Guidelines require item(s) 1-5 on all ambulatory patients or 1 and 2 on non-ambulatory patients.    1. Baseline SPO2 on Room Air at rest 97 %   If <= 88% on Room Air add O2 via NC to obtain SpO2 >=88%. If LPM needed, document LPM  needed to reach =>88%    SPO2 during exertion on Room Air 91  %  During exertion monitor SPO2. If SPO2 increases >=89%, do not add supplemental oxygen    SPO2 on Oxygen at Rest  % at  LPM    SPO2 during exertion on Oxygen  % at  LPM    Test performed during exertion activity.      []  Supplemental Home Oxygen is indicated.    [x]  Client does not qualify for home oxygen.    Respiratory Additional Notes-     Bethany Yoo, RT

## 2025-06-12 NOTE — ASSESSMENT & PLAN NOTE
Last drink morning of ED visit. Does not use home medication Naltrexone 50mg daily. Ground level fall on afternoon of initial presentation.    POA CIWA 9, CIWA is trending down during course of admission.    -Initial EtOH: 346  -CT head: No acute intracranial abnormality  -CT spine: No cervical spine fracture or traumatic malalignment    6/7 : Worsening CIWA score, last CIWA 32 at 10 AM. Transferred to ICU for phenobarbital.    Thiamine, folic acid, multivitamin  Discontinue naltrexone  Recommend outpatient follow up  Patient reporting he is interested in OP rehab but declines referral by CM

## 2025-06-12 NOTE — ASSESSMENT & PLAN NOTE
Ground level fall on afternoon of ED visit. Denies preceding headache, dizziness, lightheadedness  Multiple falls due to drinking alcohol in the past.    CT head and spine unremarkable.

## 2025-06-12 NOTE — ASSESSMENT & PLAN NOTE
Lab Results   Component Value Date    HGBA1C 5.2 05/05/2025       Recent Labs     06/11/25  1617 06/11/25  2125 06/12/25  0738 06/12/25  1132   POCGLU 131 120 117 124       Blood Sugar Average: Last 72 hrs:  (P) [id number]    Chronic, unstable.  Home meds: Gabapentin 300mg TID, Metformin 500mg daily  Hypoglycemic initially likely secondary to poor PO intake    Plan:  Resume home metformin  Discussed importance of maintaining carb controlled diet

## 2025-06-12 NOTE — ASSESSMENT & PLAN NOTE
Hx recurrent C diff infection. Most recently diagnosed 5/8 hospitalization. Started on PO vancomycin 125 mg q6h 5/8 - patient noncompliant     Continue with PO vancomycin for 7 days after discharge  q6h  Contact precautions  Probiotics

## 2025-06-12 NOTE — ASSESSMENT & PLAN NOTE
Presented to ED after alcohol intoxication and fall. EKG showing A-fib with RVR. Patient grossly noncompliant with home meds   Home meds: Eliquis 5mg BID, metoprolol 100mg BID, Cardizem 120mg BID    -Admission EKG A-fib w/RVR, rate 166  -s/p Cardizem drip    ED course: IV cardizem 10mgx1, D5NS, cardizem drip, IV mag 2gx1    Continue with metoprolol 100 mg p.o. twice daily, Cardizem 120 mg p.o. twice daily.   Resume home eliquis 5 mg BID  Extensively discussed risks of continuing eliquis in setting of frequent falls   Patient expresses understanding and wishes to continue the medication

## 2025-06-12 NOTE — ASSESSMENT & PLAN NOTE
No home O2 documented at baseline  Presented with hypoxia requiring midflow 12 L NC > 4 L NC   6/6 had increased O2 requirement up to 12L NC midflow   In setting of mucus plugging and ? aspiration  Currently at baseline on RA with SpO2 > 90%    Plan:  Rest of plan per opacity of lung

## 2025-06-12 NOTE — PLAN OF CARE
Problem: SAFETY ADULT  Goal: Patient will remain free of falls  Description: INTERVENTIONS:  - Educate patient/family on patient safety including physical limitations  - Instruct patient to call for assistance with activity   - Consider consulting OT/PT to assist with strengthening/mobility based on AM PAC & JH-HLM score  - Consult OT/PT to assist with strengthening/mobility   - Keep Call bell within reach  - Keep bed low and locked with side rails adjusted as appropriate  - Keep care items and personal belongings within reach  - Initiate and maintain comfort rounds  - Make Fall Risk Sign visible to staff  - Offer Toileting every 2 Hours, in advance of need  - Initiate/Maintain bed/chair alarm  - Apply yellow socks and bracelet for high fall risk patients  - Consider moving patient to room near nurses station  6/11/2025 2107 by Lizz Villalobos RN  Outcome: Progressing  6/11/2025 1245 by Lizz Villalobos RN  Outcome: Progressing  Goal: Maintain or return to baseline ADL function  Description: INTERVENTIONS:  -  Assess patient's ability to carry out ADLs; assess patient's baseline for ADL function and identify physical deficits which impact ability to perform ADLs (bathing, care of mouth/teeth, toileting, grooming, dressing, etc.)  - Assess/evaluate cause of self-care deficits   - Assess range of motion  - Assess patient's mobility; develop plan if impaired  - Assess patient's need for assistive devices and provide as appropriate  - Encourage maximum independence but intervene and supervise when necessary  - Involve family in performance of ADLs  - Assess for home care needs following discharge   - Consider OT consult to assist with ADL evaluation and planning for discharge  - Provide patient education as appropriate  - Monitor functional capacity and physical performance, use of AM PAC & JH-HLM   - Monitor gait, balance and fatigue with ambulation    6/11/2025 2107 by Lizz iVllalobos, RN  Outcome:  Progressing  6/11/2025 1245 by Lizz Villalobos RN  Outcome: Progressing  Goal: Maintains/Returns to pre admission functional level  Description: INTERVENTIONS:  - Perform AM-PAC 6 Click Basic Mobility/ Daily Activity assessment daily.  - Set and communicate daily mobility goal to care team and patient/family/caregiver.   - Collaborate with rehabilitation services on mobility goals if consulted  - Out of bed for toileting  - Record patient progress and toleration of activity level   6/11/2025 2107 by Lizz Villalobos RN  Outcome: Progressing  6/11/2025 1245 by Lizz Villalobos RN  Outcome: Progressing     Problem: DISCHARGE PLANNING  Goal: Discharge to home or other facility with appropriate resources  Description: INTERVENTIONS:  - Identify barriers to discharge w/patient and caregiver  - Arrange for needed discharge resources and transportation as appropriate  - Identify discharge learning needs (meds, wound care, etc.)  - Arrange for interpretive services to assist at discharge as needed  - Refer to Case Management Department for coordinating discharge planning if the patient needs post-hospital services based on physician/advanced practitioner order or complex needs related to functional status, cognitive ability, or social support system  6/11/2025 2107 by Lizz Villalobos RN  Outcome: Progressing  6/11/2025 1245 by Lizz Villalobos RN  Outcome: Progressing     Problem: Knowledge Deficit  Goal: Patient/family/caregiver demonstrates understanding of disease process, treatment plan, medications, and discharge instructions  Description: Complete learning assessment and assess knowledge base.  Interventions:  - Provide teaching at level of understanding  - Provide teaching via preferred learning methods  6/11/2025 2107 by Lizz Villalobos RN  Outcome: Progressing  6/11/2025 1245 by Lizz Villalobos RN  Outcome: Progressing     Problem: CARDIOVASCULAR - ADULT  Goal: Maintains optimal cardiac output and hemodynamic  stability  Description: INTERVENTIONS:  - Monitor I/O, vital signs and rhythm  - Monitor for S/S and trends of decreased cardiac output  - Administer and titrate ordered vasoactive medications to optimize hemodynamic stability  - Assess quality of pulses, skin color and temperature  - Assess for signs of decreased coronary artery perfusion  - Instruct patient to report change in severity of symptoms  6/11/2025 2107 by Lizz Villalobos RN  Outcome: Progressing  6/11/2025 1245 by Lizz Villalobos RN  Outcome: Progressing  Goal: Absence of cardiac dysrhythmias or at baseline rhythm  Description: INTERVENTIONS:  - Continuous cardiac monitoring, vital signs, obtain 12 lead EKG if ordered  - Administer antiarrhythmic and heart rate control medications as ordered  - Monitor electrolytes and administer replacement therapy as ordered  6/11/2025 2107 by Lizz Villalobos RN  Outcome: Progressing  6/11/2025 1245 by Lizz Villalobos RN  Outcome: Progressing     Problem: Prexisting or High Potential for Compromised Skin Integrity  Goal: Skin integrity is maintained or improved  Description: INTERVENTIONS:  - Identify patients at risk for skin breakdown  - Assess and monitor skin integrity including under and around medical devices   - Assess and monitor nutrition and hydration status  - Monitor labs  - Assess for incontinence   - Turn and reposition patient  - Assist with mobility/ambulation  - Relieve pressure over eduardo prominences   - Avoid friction and shearing  - Provide appropriate hygiene as needed including keeping skin clean and dry  - Evaluate need for skin moisturizer/barrier cream  - Collaborate with interdisciplinary team  - Patient/family teaching  - Consider wound care consult    Assess:  - Review Herrera scale daily  - Inspect skin when repositioning, toileting, and assisting with ADLS  - Assess extremities for adequate circulation and sensation     Bed Management:  - Have minimal linens on bed & keep smooth,  unwrinkled  - Change linens as needed when moist or perspiring    6/11/2025 1245 by Lizz Villalobos, RN  Outcome: Progressing     Problem: Nutrition/Hydration-ADULT  Goal: Nutrient/Hydration intake appropriate for improving, restoring or maintaining nutritional needs  Description: Monitor and assess patient's nutrition/hydration status for malnutrition. Collaborate with interdisciplinary team and initiate plan and interventions as ordered.  Monitor patient's weight and dietary intake as ordered or per policy. Utilize nutrition screening tool and intervene as necessary. Determine patient's food preferences and provide high-protein, high-caloric foods as appropriate.     INTERVENTIONS:  - Monitor oral intake, urinary output, labs, and treatment plans  - Assess nutrition and hydration status and recommend course of action  - Evaluate amount of meals eaten  - Assist patient with eating if necessary   - Allow adequate time for meals  - Recommend/ encourage appropriate diets, oral nutritional supplements, and vitamin/mineral supplements  - Order, calculate, and assess calorie counts as needed  - Recommend, monitor, and adjust tube feedings and TPN/PPN based on assessed needs  - Assess need for intravenous fluids  - Provide specific nutrition/hydration education as appropriate  - Include patient/family/caregiver in decisions related to nutrition  6/11/2025 2107 by Lizz Villalobos, RN  Outcome: Progressing  6/11/2025 1245 by Lizz Villalobos RN  Outcome: Progressing       Problem: INFECTION - ADULT  Goal: Absence or prevention of progression during hospitalization  Description: INTERVENTIONS:  - Assess and monitor for signs and symptoms of infection  - Monitor lab/diagnostic results  - Monitor all insertion sites, i.e. indwelling lines, tubes, and drains  - Monitor endotracheal if appropriate and nasal secretions for changes in amount and color  - [place] appropriate cooling/warming therapies per order  - Administer  medications as ordered  - Instruct and encourage patient and family to use good hand hygiene technique  - Identify and instruct in appropriate isolation precautions for identified infection/condition  6/11/2025 2107 by Lizz Villalobos RN  Outcome: Progressing  6/11/2025 1245 by Lizz Villalobos, RN  Outcome: Progressing

## 2025-06-12 NOTE — QUICK NOTE
I have personally counseled the patient on medication indication, compliance, utilization and side effects. Patient may be discharged home with fluticasone-vilanterol 200-25 mcg/actuation inhaler, albuterol 90 mcg/act inhaler

## 2025-06-12 NOTE — NURSING NOTE
Patient discharged home. AVS thoroughly reviewed with patient including following up lab work, diet, importance of taking all medications prescribed and follow up appointments. Patient verbalized understanding of discharge instructions. IV removed and patient sent home with inhaler and belongings.

## 2025-06-13 ENCOUNTER — TELEPHONE (OUTPATIENT)
Dept: PULMONOLOGY | Facility: MEDICAL CENTER | Age: 65
End: 2025-06-13

## 2025-06-13 ENCOUNTER — PATIENT OUTREACH (OUTPATIENT)
Age: 65
End: 2025-06-13

## 2025-06-13 ENCOUNTER — TRANSITIONAL CARE MANAGEMENT (OUTPATIENT)
Age: 65
End: 2025-06-13

## 2025-06-13 LAB — 1,25(OH)2D SERPL-MCNC: 17.2 PG/ML (ref 5–200)

## 2025-06-13 NOTE — PROGRESS NOTES
ADT alert received.    I called the patient but received voicemail.  Message was left asking for a return call.  I am sending the UTR letter and closing this case.    Chart reviewed.

## 2025-06-13 NOTE — LETTER
June 13, 2025    Gregg Martínezhallietoi  [address] [place] [id number]    Dear Mr. Partida,    We would like to invite you to participate in our Chronic Care Management program with the goal of improving your health. We will match you up with a care manager who will work with you to keep your chronic conditions under control. Please call your clinic or log on to [facility] if you have any questions or would like to enroll.    We look forward to working toward a healthier you together.         Sincerely,      Karina SORIANO Care Manager  [phone]

## 2025-06-13 NOTE — UTILIZATION REVIEW
NOTIFICATION OF ADMISSION DISCHARGE   This is a Notification of Discharge from [facility]. Please be advised that this patient has been discharge from our facility. Below you will find the admission and discharge date and time including the patient’s disposition.   UTILIZATION REVIEW CONTACT:  Utilization Review Assistants  Network Utilization Review Department  Phone: [phone] x carefully listen to the prompts. All voicemails are confidential.  Email: [email or web address]     ADMISSION INFORMATION  PRESENTATION DATE: 6/5/2025  9:33 PM  OBERVATION ADMISSION DATE: N/A  INPATIENT ADMISSION DATE: 6/5/25 11:29 PM   DISCHARGE DATE: 6/12/2025  3:30 PM   DISPOSITION:Home/Self Care    Network Utilization Review Department  ATTENTION: Please call with any questions or concerns to [phone] and carefully listen to the prompts so that you are directed to the right person. All voicemails are confidential.   For Discharge needs, contact Care Management DC Support Team at [phone] opt. 2  Send all requests for admission clinical reviews, approved or denied determinations and any other requests to dedicated fax number below belonging to the campus where the patient is receiving treatment. List of dedicated fax numbers for the Facilities:  FACILITY NAME UR FAX NUMBER   ADMISSION DENIALS (Administrative/Medical Necessity) [phone]   DISCHARGE SUPPORT TEAM ([facility]) [phone]   PARENT CHILD HEALTH (Maternity/NICU/Pediatrics) [phone]   [facility] [phone]   [facility] [phone]   [facility] [phone]   [facility] [phone]   [facility] [phone]   [facility] [phone]   [facility] [phone]   [facility] [phone]   [facility]  [facility] [phone]   [facility] [phone]   [facility] [phone]   [facility] [phone]

## 2025-06-18 ENCOUNTER — TELEPHONE (OUTPATIENT)
Age: 65
End: 2025-06-18

## 2025-06-18 NOTE — TELEPHONE ENCOUNTER
Contacted patient via phone number on file, no answer. Left message to call back and reschedule missed appointment.    Letter sent

## 2025-06-19 ENCOUNTER — PATIENT OUTREACH (OUTPATIENT)
Age: 65
End: 2025-06-19

## 2025-06-19 NOTE — LETTER
[address]  [address]  [facility] [address]  [phone]    Re: Social Work Referral   6/19/2025       Dear Gregg,    We tried to reach you by phone on 6/19/2025 and was unfortunately unable to reach you.  It is important that you contact the [facility] as soon as possible at: [phone]     Sincerely,         Mariama Disla

## 2025-06-19 NOTE — PROGRESS NOTES
MERT RAMÍREZ called patient to follow up and assist with needs. MERT RAMÍREZ unable to reach patient (x2). Left voice message requesting return call. MERT RAMÍREZ notes patient also did not arrive to TCM visit scheduled for this week. Contact information provided. MERT RAMÍREZ sent UTR letter. MERT RAMÍREZ closed case and removed self from care team.     MERT RAMÍREZ will remain available to assist as needed.

## 2025-06-19 NOTE — PROGRESS NOTES
"MERT received incoming call from patient.    Patient presented with slurred speech and delayed response times, appeared to be under the influence. MERT introduced self and reason for outreach.     Patient expressed interest in exploring alcohol abuse programs. MERT inquired what his motivator is, and he explained he is \"really sick.\" Patient states he has been drinking for \"a long time\" and denies need for emergency services.    Patient confirmed he is still actively drinking alcohol. He does not have a vehicle and utilizes \"the bus\" for transportation. Patient's services would need to remain local to [place], [place].    MERT explored his interest in an inpatient detox center -- Patient states \"I don't know about that\" and requested to call AARTI back tomorrow once he thinks about it.    AARTI spoke with [facility] on [address] in [place] -- As per intake representative, patient would need to be entirely sober prior to entering their OP treatment programs. Recommendation would be for patient to detox first, with graduation to a local outpatient setting thereafter.    [facility] will keep referral closed and await return call to determine if patient would like to be connected to detox services. MERT will remain available.   "

## 2025-06-22 ENCOUNTER — APPOINTMENT (EMERGENCY)
Dept: RADIOLOGY | Facility: HOSPITAL | Age: 65
End: 2025-06-22
Payer: COMMERCIAL

## 2025-06-22 ENCOUNTER — APPOINTMENT (EMERGENCY)
Dept: CT IMAGING | Facility: HOSPITAL | Age: 65
End: 2025-06-22
Payer: COMMERCIAL

## 2025-06-22 ENCOUNTER — HOSPITAL ENCOUNTER (EMERGENCY)
Facility: HOSPITAL | Age: 65
Discharge: HOME | End: 2025-06-22
Attending: SURGERY | Admitting: STUDENT IN AN ORGANIZED HEALTH CARE EDUCATION/TRAINING PROGRAM
Payer: COMMERCIAL

## 2025-06-22 VITALS
SYSTOLIC BLOOD PRESSURE: 131 MMHG | OXYGEN SATURATION: 98 % | TEMPERATURE: 97.6 F | WEIGHT: 177.47 LBS | DIASTOLIC BLOOD PRESSURE: 72 MMHG | RESPIRATION RATE: 18 BRPM | BODY MASS INDEX: 22.79 KG/M2 | HEART RATE: 102 BPM

## 2025-06-22 DIAGNOSIS — S31.000A WOUND OF SACRAL REGION, INITIAL ENCOUNTER: ICD-10-CM
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
## 2025-06-22 PROBLEM — F10.920 ALCOHOLIC INTOXICATION WITHOUT COMPLICATION (HCC): Status: ACTIVE | Noted: 2024-05-10

## 2025-06-22 LAB
ANION GAP SERPL CALCULATED.3IONS-SCNC: 14 MMOL/L (ref 4–13)
ANISOCYTOSIS BLD QL SMEAR: PRESENT
APTT PPP: 29 SECONDS (ref 23–34)
BASE EXCESS BLDA CALC-SCNC: 1 MMOL/L (ref -2–3)
BASOPHILS # BLD MANUAL: 0.08 THOUSAND/UL (ref 0–0.1)
BASOPHILS NFR MAR MANUAL: 2 % (ref 0–1)
BUN SERPL-MCNC: 17 MG/DL (ref 5–25)
CA-I BLD-SCNC: 0.9 MMOL/L (ref 1.12–1.32)
CALCIUM SERPL-MCNC: 7.7 MG/DL (ref 8.4–10.2)
CHLORIDE SERPL-SCNC: 103 MMOL/L (ref 96–108)
CK SERPL-CCNC: 92 U/L (ref 39–308)
CO2 SERPL-SCNC: 26 MMOL/L (ref 21–32)
CREAT SERPL-MCNC: 1.12 MG/DL (ref 0.6–1.3)
EOSINOPHIL # BLD MANUAL: 0 THOUSAND/UL (ref 0–0.4)
EOSINOPHIL NFR BLD MANUAL: 0 % (ref 0–6)
ERYTHROCYTE [DISTWIDTH] IN BLOOD BY AUTOMATED COUNT: 17.8 % (ref 11.6–15.1)
GFR SERPL CREATININE-BSD FRML MDRD: 69 ML/MIN/1.73SQ M
GLUCOSE SERPL-MCNC: 114 MG/DL (ref 65–140)
GLUCOSE SERPL-MCNC: 135 MG/DL (ref 65–140)
GLUCOSE SERPL-MCNC: 56 MG/DL (ref 65–140)
GLUCOSE SERPL-MCNC: 59 MG/DL (ref 65–140)
GLUCOSE SERPL-MCNC: 59 MG/DL (ref 65–140)
HCO3 BLDA-SCNC: 25.4 MMOL/L (ref 24–30)
HCT VFR BLD AUTO: 32.1 % (ref 36.5–49.3)
HCT VFR BLD CALC: 34 % (ref 36.5–49.3)
HGB BLD-MCNC: 10.4 G/DL (ref 12–17)
HGB BLDA-MCNC: 11.6 G/DL (ref 12–17)
INR PPP: 1.11 (ref 0.85–1.19)
LYMPHOCYTES # BLD AUTO: 1.25 THOUSAND/UL (ref 0.6–4.47)
LYMPHOCYTES # BLD AUTO: 30 % (ref 14–44)
MCH RBC QN AUTO: 31.2 PG (ref 26.8–34.3)
MCHC RBC AUTO-ENTMCNC: 32.4 G/DL (ref 31.4–37.4)
MCV RBC AUTO: 96 FL (ref 82–98)
MONOCYTES # BLD AUTO: 0.21 THOUSAND/UL (ref 0–1.22)
MONOCYTES NFR BLD: 5 % (ref 4–12)
NEUTROPHILS # BLD MANUAL: 2.63 THOUSAND/UL (ref 1.85–7.62)
NEUTS BAND NFR BLD MANUAL: 1 % (ref 0–8)
NEUTS SEG NFR BLD AUTO: 62 % (ref 43–75)
PCO2 BLD: 27 MMOL/L (ref 21–32)
PCO2 BLD: 40.7 MM HG (ref 42–50)
PH BLD: 7.4 [PH] (ref 7.3–7.4)
PLATELET # BLD AUTO: 169 THOUSANDS/UL (ref 149–390)
PLATELET BLD QL SMEAR: ADEQUATE
PLATELET CLUMP BLD QL SMEAR: PRESENT
PMV BLD AUTO: 10.5 FL (ref 8.9–12.7)
PO2 BLD: 58 MM HG (ref 35–45)
POLYCHROMASIA BLD QL SMEAR: PRESENT
POTASSIUM BLD-SCNC: 4.1 MMOL/L (ref 3.5–5.3)
POTASSIUM SERPL-SCNC: 4 MMOL/L (ref 3.5–5.3)
PROTHROMBIN TIME: 15 SECONDS (ref 12.3–15)
RBC # BLD AUTO: 3.33 MILLION/UL (ref 3.88–5.62)
RBC MORPH BLD: PRESENT
SAO2 % BLD FROM PO2: 90 % (ref 60–85)
SODIUM BLD-SCNC: 143 MMOL/L (ref 136–145)
SODIUM SERPL-SCNC: 143 MMOL/L (ref 135–147)
SPECIMEN SOURCE: ABNORMAL
WBC # BLD AUTO: 4.18 THOUSAND/UL (ref 4.31–10.16)

## 2025-06-22 PROCEDURE — 84295 ASSAY OF SERUM SODIUM: CPT

## 2025-06-22 PROCEDURE — 72192 CT PELVIS W/O DYE: CPT

## 2025-06-22 PROCEDURE — 85007 BL SMEAR W/DIFF WBC COUNT: CPT | Performed by: SURGERY

## 2025-06-22 PROCEDURE — 72125 CT NECK SPINE W/O DYE: CPT

## 2025-06-22 PROCEDURE — 93308 TTE F-UP OR LMTD: CPT | Performed by: PHYSICIAN ASSISTANT

## 2025-06-22 PROCEDURE — 85014 HEMATOCRIT: CPT

## 2025-06-22 PROCEDURE — 84132 ASSAY OF SERUM POTASSIUM: CPT

## 2025-06-22 PROCEDURE — 85027 COMPLETE CBC AUTOMATED: CPT | Performed by: SURGERY

## 2025-06-22 PROCEDURE — 82803 BLOOD GASES ANY COMBINATION: CPT

## 2025-06-22 PROCEDURE — 36415 COLL VENOUS BLD VENIPUNCTURE: CPT | Performed by: SURGERY

## 2025-06-22 PROCEDURE — 82330 ASSAY OF CALCIUM: CPT

## 2025-06-22 PROCEDURE — 71045 X-RAY EXAM CHEST 1 VIEW: CPT

## 2025-06-22 PROCEDURE — 80048 BASIC METABOLIC PNL TOTAL CA: CPT | Performed by: SURGERY

## 2025-06-22 PROCEDURE — 76604 US EXAM CHEST: CPT | Performed by: PHYSICIAN ASSISTANT

## 2025-06-22 PROCEDURE — 82947 ASSAY GLUCOSE BLOOD QUANT: CPT

## 2025-06-22 PROCEDURE — 99284 EMERGENCY DEPT VISIT MOD MDM: CPT

## 2025-06-22 PROCEDURE — 82948 REAGENT STRIP/BLOOD GLUCOSE: CPT

## 2025-06-22 PROCEDURE — 82550 ASSAY OF CK (CPK): CPT | Performed by: SURGERY

## 2025-06-22 PROCEDURE — 99284 EMERGENCY DEPT VISIT MOD MDM: CPT | Performed by: SURGERY

## 2025-06-22 PROCEDURE — 85610 PROTHROMBIN TIME: CPT | Performed by: SURGERY

## 2025-06-22 PROCEDURE — 70450 CT HEAD/BRAIN W/O DYE: CPT

## 2025-06-22 PROCEDURE — EDAIR PR ED AIR: Performed by: STUDENT IN AN ORGANIZED HEALTH CARE EDUCATION/TRAINING PROGRAM

## 2025-06-22 PROCEDURE — 76705 ECHO EXAM OF ABDOMEN: CPT | Performed by: PHYSICIAN ASSISTANT

## 2025-06-22 PROCEDURE — 85730 THROMBOPLASTIN TIME PARTIAL: CPT | Performed by: SURGERY

## 2025-06-22 RX ORDER — DEXTROSE MONOHYDRATE 25 G/50ML
25 INJECTION, SOLUTION INTRAVENOUS ONCE
Status: DISCONTINUED | OUTPATIENT
Start: 2025-06-22 | End: 2025-06-23 | Stop reason: HOSPADM

## 2025-06-22 NOTE — ASSESSMENT & PLAN NOTE
- Status post fall while intoxicated with the below noted injuries.  - Fall precautions.  - PT and OT evaluation and treatment as indicated.  - Case Management consultation for disposition planning.

## 2025-06-22 NOTE — H&P
"H&P - Trauma   Name: Gregg Partida 65 y.o. male I MRN: [id number]  Unit/Bed#: TR-02 I Date of Admission: 6/22/2025   Date of Service: 6/22/2025 I Hospital Day: 0     Assessment & Plan  Fall  - Status post fall while intoxicated with the below noted injuries.  - Fall precautions.  - PT and OT evaluation and treatment as indicated.  - Case Management consultation for disposition planning.  Sacral wound, initial encounter  - Sacral wound likely related to trauma with erythema and small open area, but alternatively it could be a pressure wound.  - Patient reports minimal downtime following the fall and did note that he landed on his sacrum.  - Local wound care as indicated with plan for application of silicone bordered foam dressing.  - Recommend offloading therapy with frequent position changes.  - Analgesia as needed.  - Follow-up CT scan of the pelvis to rule out occult fracture/underlying injury.  Alcoholic intoxication without complication (HCC)  - Patient with acute alcohol intoxication in setting of chronic alcohol abuse.   - He reports that he is not experiencing withdrawal in the past.  - May have diet as tolerated and will initiate CIWA protocol if requires admission.  - Encourage outpatient follow-up with PCP to discuss alcohol cessation and rehab options.    Disposition: Plan for ambulation trial if CT pelvis returns negative.  Anticipate likely discharge home either when sober or if a responsible adult is available to pick him up if remaining trauma workup negative.    Trauma Alert: Level B   Model of Arrival: Ambulance    Trauma Team: Attending Dr. Newman and JEREMIE Stoner PA-C  Consultants: To be determined pending trauma imaging.     History of Present Illness   Chief Complaint: \"My butt hurts.\"  Mechanism:Fall     Gregg Partida is a 65 y.o. male who presents for evaluation following a reported fall.  The patient does remember falling and landing on his backside.  He does not remember hitting " his head and denies losing consciousness.  He notes that his neighbor found him and called for help.  EMS reports that he does take Eliquis and that he has been appropriate, but is intoxicated and had approximately a quart of vodka to drink today.  The patient does report drinking a quart of vodka daily.    Review of Systems   Constitutional:  Negative for activity change, appetite change, chills, fatigue, fever and unexpected weight change.   HENT:  Negative for congestion, facial swelling and sore throat.    Eyes: Negative.  Negative for pain and visual disturbance.   Respiratory: Negative.  Negative for cough, chest tightness, shortness of breath and wheezing.    Cardiovascular: Negative.  Negative for chest pain and leg swelling.   Gastrointestinal: Negative.  Negative for abdominal distention, abdominal pain, constipation, diarrhea, nausea and vomiting.   Endocrine: Negative.    Genitourinary: Negative.  Negative for difficulty urinating, dysuria, flank pain, frequency and hematuria.   Musculoskeletal:  Positive for arthralgias (Sacral pain). Negative for back pain.   Skin:  Positive for wound (Right forearm wound). Negative for color change, pallor and rash.   Allergic/Immunologic: Negative.    Neurological: Negative.  Negative for dizziness, syncope, weakness, light-headedness and headaches.   Hematological: Negative.    Psychiatric/Behavioral: Negative.  Negative for agitation and confusion.    All other systems reviewed and are negative.    Medical History Review: I have reviewed the patient's PMH, PSH, Social History, Family History, Meds, and Allergies   Historical Information   Past Medical History[1]  Past Surgical History[2]  Social History[3]  E-Cigarette/Vaping    E-Cigarette Use Never User      E-Cigarette/Vaping Substances    Nicotine Yes     THC No     CBD No     Flavoring No     Other No     Unknown No      Family history non-contributory  Social History[4]  No current facility-administered  medications for this encounter.  Prior to Admission Medications   Prescriptions Last Dose Informant Patient Reported? Taking?   Blood Glucose Monitoring Suppl (ONE TOUCH ULTRA MINI) w/Device KIT  Self Yes No   Sig: Use as directed   Patient not taking: Reported on 6/5/2025   Blood Pressure Monitoring (Adult Blood Pressure Cuff Lg) KIT   No No   Sig: Use in the morning   Patient not taking: Reported on 6/5/2025   ONETOUCH DELICA LANCETS FINE MISC  Self Yes No   Sig: in the morning and in the evening and before bedtime. Test.   Patient not taking: Reported on 6/5/2025   Thiamine Mononitrate (VITAMIN B1) 100 mg tablet   No No   Sig: Take 1 tablet (100 mg total) by mouth in the morning.   albuterol (2.5 mg/3 mL) 0.083 % nebulizer solution   No No   Sig: Take 3 mL (2.5 mg total) by nebulization 3 (three) times a day   albuterol (PROVENTIL HFA,VENTOLIN HFA) 90 mcg/act inhaler   No No   Sig: Inhale 2 puffs every 6 (six) hours as needed for wheezing or shortness of breath   apixaban (Eliquis) 5 mg   No No   Sig: Take 1 tablet (5 mg total) by mouth in the morning and 1 tablet (5 mg total) in the evening.   aspirin 81 mg chewable tablet   No No   Sig: Chew 1 tablet (81 mg total) in the morning.   atorvastatin (LIPITOR) 40 mg tablet   No No   Sig: Take 1 tablet (40 mg total) by mouth in the morning.   diltiazem (CARDIZEM SR) 120 mg 12 hr capsule   No No   Sig: Take 1 capsule (120 mg total) by mouth in the morning and 1 capsule (120 mg total) in the evening.   ferrous sulfate 325 (65 Fe) mg tablet   No No   Sig: Take 1 tablet (325 mg total) by mouth daily with breakfast   fluticasone-vilanterol 200-25 mcg/actuation inhaler   No No   Sig: Inhale 1 puff in the morning. Rinse mouth after use.   folic acid (FOLVITE) 400 mcg tablet   No No   Sig: Take 1 tablet (400 mcg total) by mouth in the morning.   gabapentin (NEURONTIN) 300 mg capsule   No No   Sig: Take 1 capsule (300 mg total) by mouth in the morning and 1 capsule (300 mg  total) in the evening and 1 capsule (300 mg total) before bedtime.   guaiFENesin (MUCINEX) 600 mg 12 hr tablet   No No   Sig: Take 1 tablet (600 mg total) by mouth every 12 (twelve) hours for 14 days   metFORMIN (GLUCOPHAGE) 500 mg tablet   No No   Sig: Take 1 tablet (500 mg total) by mouth daily with breakfast   metoprolol succinate (TOPROL-XL) 100 mg 24 hr tablet   No No   Sig: Take 1 tablet (100 mg total) by mouth daily   pantoprazole (PROTONIX) 40 mg tablet   No No   Sig: Take 1 tablet (40 mg total) by mouth in the morning.   ranolazine (RANEXA) 500 mg 12 hr tablet   No No   Sig: Take 1 tablet (500 mg total) by mouth in the morning and 1 tablet (500 mg total) in the evening.   saccharomyces boulardii (FLORASTOR) 250 mg capsule   No No   Sig: Take 1 capsule (250 mg total) by mouth in the morning and 1 capsule (250 mg total) in the evening.   tamsulosin (FLOMAX) 0.4 mg   No No   Sig: Take 1 capsule (0.4 mg total) by mouth daily with dinner   torsemide (DEMADEX) 10 mg tablet   No No   Sig: Take 1 tablet (10 mg total) by mouth every other day      Facility-Administered Medications: None     Patient has no known allergies.  Immunization History   Administered Date(s) Administered    COVID-19 MODERNA VACC 0.25 ML IM BOOSTER 01/21/2022    COVID-19 MODERNA VACC 0.5 ML IM 05/14/2021, 06/14/2021    INFLUENZA 10/11/2022    Influenza Recombinant Preservative Free Im 11/15/2024    Influenza, recombinant, quadrivalent,injectable, preservative free 10/17/2019, 09/23/2020, 02/10/2022, 10/11/2022    Pneumococcal Conjugate Vaccine 20-valent (Pcv20), Polysace 05/12/2023    Pneumococcal Polysaccharide PPV23 04/26/2021    Tdap 02/27/2020, 12/05/2020, 05/04/2025     Last Tetanus: Unknown, but patient believes it is up-to-date         Objective :       Initial Vitals:        Primary Survey:   Airway:        Status: patent;        Pre-hospital Interventions: none        Hospital Interventions: none  Breathing:        Pre-hospital  Interventions: none       Effort: normal       Right breath sounds: normal       Left breath sounds: normal  Circulation:        Rhythm: regular       Rate: regular   Right Pulses Left Pulses    R radial: 2+  R femoral: 2+  R pedal: 2+  R carotid: 2+   L radial: 2+  L femoral: 2+  L pedal: 2+  L carotid: 2+     Disability:        GCS: Eye: 4; Verbal: 5 Motor: 6 Total: 15       Right Pupil: 3 mm;  round;  reactive         Left Pupil:  3 mm;  round;  reactive      R Motor Strength L Motor Strength    R : 5/5  R dorsiflex: 5/5  R plantarflex: 5/5 L : 5/5  L dorsiflex: 5/5  L plantarflex: 5/5        Sensory:  No sensory deficit  Exposure:       Completed: Yes      Secondary Survey:  Physical Exam  Vitals and nursing note reviewed. Exam conducted with a chaperone present.   Constitutional:       General: He is awake. He is not in acute distress.     Appearance: Normal appearance. He is normal weight. He is not ill-appearing, toxic-appearing or diaphoretic.      Interventions: He is not intubated.Cervical collar in place.      Comments: Patient did appear intoxicated and smell of alcohol readily apparent.   HENT:      Head: Normocephalic and atraumatic. No abrasion, contusion or laceration.      Jaw: There is normal jaw occlusion.      Right Ear: Hearing and external ear normal. No swelling or tenderness.      Left Ear: Hearing and external ear normal. No swelling or tenderness.      Nose: Nose normal. No nasal deformity, septal deviation, signs of injury, laceration or nasal tenderness.      Mouth/Throat:      Mouth: Mucous membranes are moist.      Pharynx: Oropharynx is clear.     Eyes:      General: Lids are normal. Vision grossly intact.      Extraocular Movements: Extraocular movements intact.      Conjunctiva/sclera: Conjunctivae normal.      Pupils: Pupils are equal, round, and reactive to light.       Cardiovascular:      Rate and Rhythm: Normal rate and regular rhythm.      Pulses:           Carotid  pulses are 2+ on the right side and 2+ on the left side.       Radial pulses are 2+ on the right side and 2+ on the left side.        Femoral pulses are 2+ on the right side and 2+ on the left side.       Dorsalis pedis pulses are 2+ on the right side and 2+ on the left side.      Heart sounds: Normal heart sounds. No murmur heard.     No friction rub. No gallop.   Pulmonary:      Effort: Pulmonary effort is normal. No tachypnea, bradypnea, accessory muscle usage, prolonged expiration, respiratory distress or retractions. He is not intubated.      Breath sounds: Normal breath sounds and air entry. No stridor or decreased air movement. No decreased breath sounds, wheezing, rhonchi or rales.   Chest:      Chest wall: No lacerations, deformity, swelling, tenderness or crepitus.   Abdominal:      General: Abdomen is flat. Bowel sounds are normal. There is no distension.      Palpations: Abdomen is soft.      Tenderness: There is no abdominal tenderness. There is no guarding or rebound.     Musculoskeletal:         General: Tenderness (Tenderness over the sacrum) present. No swelling or deformity. Normal range of motion.      Cervical back: Neck supple. No swelling, deformity, signs of trauma, lacerations or tenderness. No spinous process tenderness or muscular tenderness.      Thoracic back: No swelling, deformity, signs of trauma, lacerations or tenderness.      Lumbar back: No swelling, deformity, signs of trauma, lacerations or tenderness.      Right lower leg: No edema.      Left lower leg: No edema.      Comments: No midline cervical, thoracic or lumbar spine tenderness, step-offs or deformities.  No paraspinal muscular tenderness in the neck or back.    The patient did have sacral tenderness with erythema centrally and a small wound over the medial left buttock measuring less than a centimeter with a clean healthy wound base.    Normal range of motion in all 4 extremities without pain, tenderness or deformity.    "    Skin:     General: Skin is warm and dry.      Capillary Refill: Capillary refill takes less than 2 seconds.      Coloration: Skin is not jaundiced or pale.      Findings: Erythema (Erythema over the sacrum) and wound (There is a small wound over the medial left buttock measuring less than a centimeter with a clean healthy wound base.) present. No abrasion, bruising, ecchymosis, laceration, lesion or rash.          Neurological:      General: No focal deficit present.      Mental Status: He is alert and oriented to person, place, and time. Mental status is at baseline.      GCS: GCS eye subscore is 4. GCS verbal subscore is 5. GCS motor subscore is 6.      Sensory: Sensation is intact. No sensory deficit.      Motor: Motor function is intact. No weakness.     Psychiatric:         Mood and Affect: Mood normal.         Behavior: Behavior is cooperative.             Lab Results: I have reviewed the following results:  No results for input(s): \"WBC\", \"HGB\", \"HCT\", \"PLT\", \"BANDSPCT\", \"SODIUM\", \"K\", \"CL\", \"CO2\", \"BUN\", \"CREATININE\", \"GLUC\", \"CAIONIZED\", \"MG\", \"PHOS\", \"AST\", \"ALT\", \"ALB\", \"TBILI\", \"DBILI\", \"ALKPHOS\", \"PTT\", \"INR\", \"HSTNI0\", \"HSTNI2\", \"BNP\", \"LACTICACID\" in the last 72 hours.    Imaging Results: I have personally reviewed pertinent images saved in PACS. CT scan findings (and other pertinent positive findings on images) were discussed with radiology. My interpretation of the images/reports are as follows:  Chest Xray(s): pending   FAST exam(s): negative for acute findings   CT Scan(s): pending   Additional Xray(s): N/A     Other Studies: Other Study Results Review: No additional pertinent studies reviewed.       [1]   Past Medical History:  Diagnosis Date    Acute encephalopathy 06/07/2025    Acute on chronic kidney failure  (HCC)     Alcohol abuse     Alcohol withdrawal (HCC) 10/09/2018    Atrial fibrillation (HCC)     Cancer (HCC)     prostate ca,had radiation    Cardiac disease     stents,then triple " "bypass    COPD (chronic obstructive pulmonary disease) ([facility])     Coronary artery disease     Elevated troponin 09/28/2023    ETOH abuse     Heart failure ([facility])     History of heart surgery     says [facility]    Hx of heart artery stent     2014    Hyperlipidemia     Hypertension     Hypoglycemia 06/05/2025    Hyponatremia 10/17/2019    Hypovolemic shock ([facility]) 12/22/2019    Increased anion gap 04/21/2021    Lumbar spondylitis ([facility]) 10/13/2022    Metabolic acidosis, increased anion gap 04/21/2021    Nasal bone fracture 10/10/2022    Prostate CA ([facility])     S/P CABG x 3     2004    Sleep apnea    [2]   Past Surgical History:  Procedure Laterality Date    CARDIAC CATHETERIZATION      2 stents    CORONARY ARTERY BYPASS GRAFT      CORONARY ARTERY BYPASS GRAFT  2004    [place] ARTHRD ANT INTERBODY MIN DSC CRV BELOW C2 N/A 12/16/2020    Procedure: Anterior cervical discectomy with fusion C4-C7; Posterior cervical decompression and fusion C2-T2;  Surgeon: David Rowell MD;  Location: [facility];  Service: Neurosurgery    TONSILLECTOMY     [3]   Social History  Tobacco Use    Smoking status: Every Day     Current packs/day: 1.50     Average packs/day: 1.5 packs/day for 40.0 years (60.0 ttl pk-yrs)     Types: Cigarettes    Smokeless tobacco: Never   Vaping Use    Vaping status: Never Used   Substance and Sexual Activity    Alcohol use: Yes     Alcohol/week: 4.0 standard drinks of alcohol     Types: 4 Standard drinks or equivalent per week     Comment: \"quart a day\"    Drug use: No    Sexual activity: Not Currently   [4]   Social History  Tobacco Use    Smoking status: Every Day     Current packs/day: 1.50     Average packs/day: 1.5 packs/day for 40.0 years (60.0 ttl pk-yrs)     Types: Cigarettes    Smokeless tobacco: Never   Vaping Use    Vaping status: Never Used   Substance and Sexual Activity    Alcohol use: Yes     Alcohol/week: 4.0 standard drinks of alcohol     Types: 4 Standard drinks or equivalent per week " "    Comment: \"quart a day\"    Drug use: No    Sexual activity: Not Currently     "

## 2025-06-22 NOTE — PROCEDURES
POC FAST US    Date/Time: 6/22/2025 5:17 PM    Performed by: Talib Stoner PA-C  Authorized by: Talib Stoner PA-C    Patient location:  Trauma  Procedure details:     Exam Type:  Diagnostic    Indications: blunt abdominal trauma and blunt chest trauma      Assess for:  Intra-abdominal fluid, pericardial effusion and pneumothorax    Technique: extended FAST      Views obtained:  Heart - Pericardial sac, LUQ - Splenorenal space, Suprapubic - Pouch of Daljit, RUQ - Blankenship's Pouch, Left thorax and Right thorax    Image quality: diagnostic      Image availability:  Images available in PACS and video obtained  FAST Findings:     RUQ (Hepatorenal) free fluid: absent      LUQ (Splenorenal) free fluid: absent      Suprapubic free fluid: absent      Cardiac wall motion: identified      Pericardial effusion: absent    extended FAST (Pulmonary) findings:     Left lung sliding: Present      Right lung sliding: Present    Interpretation:     Impressions: negative

## 2025-06-22 NOTE — ASSESSMENT & PLAN NOTE
- Sacral wound likely related to trauma with erythema and small open area, but alternatively it could be a pressure wound.  - Patient reports minimal downtime following the fall and did note that he landed on his sacrum.  - Local wound care as indicated with plan for application of silicone bordered foam dressing.  - Recommend offloading therapy with frequent position changes.  - Analgesia as needed.  - Follow-up CT scan of the pelvis to rule out occult fracture/underlying injury.

## 2025-06-22 NOTE — ED PROVIDER NOTES
Emergency Department Airway Evaluation and Management Form    History  Obtained from: EMS, patient  Patient has no known allergies.  Chief Complaint   Patient presents with    Trauma     HPI    Past Medical History[1]  Past Surgical History[2]  Family History[3]  Social History[4]  I have reviewed and agree with the history as documented.    Review of Systems    Physical Exam  There were no vitals taken for this visit.    Physical Exam    ED Medications  Medications - No data to display    Intubation  Procedures    Notes  Primary survey intact, airway patent, GCS15. Remainder of HPI deferred to Trauma service.    Final Diagnosis  Final diagnoses:   None       ED Provider  Electronically Signed by       [1]   Past Medical History:  Diagnosis Date    Acute encephalopathy 06/07/2025    Acute on chronic kidney failure  (HCC)     Alcohol abuse     Alcohol withdrawal (HCC) 10/09/2018    Atrial fibrillation (HCC)     Cancer (HCC)     prostate ca,had radiation    Cardiac disease     stents,then triple bypass    COPD (chronic obstructive pulmonary disease) ([facility])     Coronary artery disease     Elevated troponin 09/28/2023    ETOH abuse     Heart failure (HCC)     History of heart surgery     says triple bypass [facility]    Hx of heart artery stent     2014    Hyperlipidemia     Hypertension     Hypoglycemia 06/05/2025    Hyponatremia 10/17/2019    Hypovolemic shock (HCC) 12/22/2019    Increased anion gap 04/21/2021    Lumbar spondylitis (HCC) 10/13/2022    Metabolic acidosis, increased anion gap 04/21/2021    Nasal bone fracture 10/10/2022    Prostate CA (HCC)     S/P CABG x 3     2004    Sleep apnea    [2]   Past Surgical History:  Procedure Laterality Date    CARDIAC CATHETERIZATION      2 stents    CORONARY ARTERY BYPASS GRAFT      CORONARY ARTERY BYPASS GRAFT  2004    [place] ARTHRD ANT INTERBODY MIN DSC CRV BELOW C2 N/A 12/16/2020    Procedure: Anterior cervical discectomy with fusion C4-C7; Posterior cervical  "decompression and fusion C2-T2;  Surgeon: David Rowell MD;  Location: [facility];  Service: Neurosurgery    TONSILLECTOMY     [3]   Family History  Problem Relation Name Age of Onset    Diabetes Mother      Uterine cancer Mother      COPD Father      Hypertension Father     [4]   Social History  Tobacco Use    Smoking status: Every Day     Current packs/day: 1.50     Average packs/day: 1.5 packs/day for 40.0 years (60.0 ttl pk-yrs)     Types: Cigarettes    Smokeless tobacco: Never   Vaping Use    Vaping status: Never Used   Substance Use Topics    Alcohol use: Yes     Alcohol/week: 4.0 standard drinks of alcohol     Types: 4 Standard drinks or equivalent per week     Comment: \"quart a day\"    Drug use: No        Brittany Valverde MD  06/22/25 [id number]    "

## 2025-06-22 NOTE — QUICK NOTE
Cervical Collar Clearance:    The patient had a CT scan of the cervical spine demonstrating no acute injury. On exam, the patient had no midline point tenderness or paresthesias/numbness/weakness in the extremities. The patient had full range of motion (was then able to flex, extend, and rotate head laterally) without pain. There were no distracting injuries and the patient was not intoxicated.      The patient's cervical spine was cleared radiologically and clinically. Cervical collar removed at this time.     Philip Gee PA-C  6/22/2025 [id number]

## 2025-06-22 NOTE — ASSESSMENT & PLAN NOTE
- Patient with acute alcohol intoxication in setting of chronic alcohol abuse.   - He reports that he is not experiencing withdrawal in the past.  - May have diet as tolerated and will initiate CIWA protocol if requires admission.  - Encourage outpatient follow-up with PCP to discuss alcohol cessation and rehab options.

## 2025-06-23 ENCOUNTER — RESULTS FOLLOW-UP (OUTPATIENT)
Age: 65
End: 2025-06-23

## 2025-06-23 ENCOUNTER — HOSPITAL ENCOUNTER (EMERGENCY)
Facility: HOSPITAL | Age: 65
Discharge: HOME | End: 2025-06-23
Attending: EMERGENCY MEDICINE | Admitting: EMERGENCY MEDICINE
Payer: COMMERCIAL

## 2025-06-23 VITALS
OXYGEN SATURATION: 97 % | RESPIRATION RATE: 18 BRPM | TEMPERATURE: 98.2 F | SYSTOLIC BLOOD PRESSURE: 183 MMHG | DIASTOLIC BLOOD PRESSURE: 101 MMHG | HEART RATE: 72 BPM

## 2025-06-23 DIAGNOSIS — F10.939 ALCOHOL WITHDRAWAL (HCC): ICD-10-CM
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
## 2025-06-23 DIAGNOSIS — Z59.89 LIVING ACCOMMODATION ISSUES: ICD-10-CM

## 2025-06-23 PROCEDURE — 99284 EMERGENCY DEPT VISIT MOD MDM: CPT | Performed by: EMERGENCY MEDICINE

## 2025-06-23 PROCEDURE — 99282 EMERGENCY DEPT VISIT SF MDM: CPT

## 2025-06-23 SDOH — ECONOMIC STABILITY - INCOME SECURITY: OTHER PROBLEMS RELATED TO HOUSING AND ECONOMIC CIRCUMSTANCES: Z59.89

## 2025-06-23 NOTE — TRAUMA DOCUMENTATION
Missed vitals due to patient being in the bathroom with  multiple bowel movements. Pt ambulated independently with walker.

## 2025-06-23 NOTE — DISCHARGE INSTRUCTIONS
Trauma Discharge Instructions:    Please follow-up as instructed. If you need a follow-up appointment, please call the office when you leave to schedule an appointment.    Activity:  - PT and OT evaluation and treatment as indicated.  - You may resume activity as tolerated.  - Walking and normal light activities are encouraged.  - Normal daily activities including climbing steps are okay.    Diet:    - You may resume your normal diet.    Medications:  - You should continue your current medication regimen after discharge unless otherwise instructed. Please refer to your discharge medication list for further details.    Additional Instructions:  - May shower daily.  - If you have any questions or concerns after discharge please call the office.  - Call office or return to ER if fever greater than 101, chills, persistent nausea/vomiting, worsening/uncontrollable pain, develop productive cough, increasing shortness of breath, difficulty breathing, and/or increasing redness or purulent/foul smelling drainage from incision(s).   
upper

## 2025-06-23 NOTE — ED PROVIDER NOTES
Time reflects when diagnosis was documented in both MDM as applicable and the Disposition within this note       Time User Action Codes Description Comment    6/23/2025  7:28 AM Gregg Fernández [W19.XXXA] Fall, initial encounter     6/23/2025  7:29 AM Gregg Fernández [Z59.89] Living accommodation issues     6/23/2025  7:29 AM Gregg Fernández [F10.939] Alcohol withdrawal (HCC)           ED Disposition       ED Disposition   Discharge    Condition   Stable    Date/Time   Mon Jun 23, 2025  7:27 AM    Comment   Gregg Partida discharge to home/self care.                   Assessment & Plan       Medical Decision Making  65-year-old male previously seen by the trauma team yesterday returned back to the emergency department due to not being able to get in to his home.  Patient was discharged at night but no one was around to let him manage he does not have his keys.  States that if he is discharged now someone will be around and be able to let him in.  Patient denies any other complaints at this time.  Patient does have a history of alcohol use, due to shaking possibly going into mild withdrawal, offered treatment and rehab but patient refuses both at this time.                 Medications - No data to display    ED Risk Strat Scores                    No data recorded                            History of Present Illness       Chief Complaint   Patient presents with    Medical Problem     Pt discharged earlier this evening, sent back due to not being able to get into his building        Past Medical History[1]   Past Surgical History[2]   Family History[3]   Social History[4]   E-Cigarette/Vaping    E-Cigarette Use Never User       E-Cigarette/Vaping Substances    Nicotine Yes     THC No     CBD No     Flavoring No     Other No     Unknown No       I have reviewed and agree with the history as documented.     65-year-old male previously seen by the trauma team yesterday returned back to the emergency  department due to not being able to get in to his home.  Patient was discharged at night but no one was around to let him manage he does not have his keys.  States that if he is discharged now someone will be around and be able to let him in.  Patient denies any other complaints at this time.        Review of Systems   Constitutional:  Negative for chills and fever.   Gastrointestinal:  Negative for abdominal pain, nausea and vomiting.   Skin:  Positive for wound.           Objective       ED Triage Vitals   Temperature Pulse Blood Pressure Respirations SpO2 Patient Position - Orthostatic VS   06/23/25 0130 06/23/25 0128 06/23/25 0128 06/23/25 0128 06/23/25 0128 --   98.2 °F (36.8 °C) 72 (!) 183/101 18 97 %       Temp Source Heart Rate Source BP Location FiO2 (%) Pain Score    06/23/25 0130 06/23/25 0128 06/23/25 0128 -- --    Oral Monitor Right arm        Vitals      Date and Time Temp Pulse SpO2 Resp BP Pain Score FACES Pain Rating User   06/23/25 0130 98.2 °F (36.8 °C) -- -- -- -- -- -- MARICRUZ   06/23/25 0128 -- 72 97 % 18 183/101 -- -- MARICRUZ            Physical Exam  Vitals and nursing note reviewed.   Constitutional:       General: He is not in acute distress.     Appearance: He is well-developed.   HENT:      Head: Normocephalic and atraumatic.      Nose: Nose normal. No congestion.      Mouth/Throat:      Pharynx: Oropharynx is clear.     Eyes:      Extraocular Movements: Extraocular movements intact.      Conjunctiva/sclera: Conjunctivae normal.       Cardiovascular:      Rate and Rhythm: Normal rate and regular rhythm.      Pulses: Normal pulses.      Heart sounds: Normal heart sounds. No murmur heard.  Pulmonary:      Effort: Pulmonary effort is normal. No respiratory distress.      Breath sounds: Normal breath sounds.   Chest:      Chest wall: No tenderness.   Abdominal:      General: Abdomen is flat. Bowel sounds are normal. There is no distension.      Palpations: Abdomen is soft.      Tenderness: There is no  abdominal tenderness.     Musculoskeletal:         General: No deformity or signs of injury. Normal range of motion.      Cervical back: Normal range of motion and neck supple. No rigidity or tenderness.      Right lower leg: No edema.      Left lower leg: No edema.     Skin:     General: Skin is warm and dry.      Findings: Lesion present. No bruising or rash.      Comments: Abrasion to right forearm.  Dressed with gauze wrapping.     Neurological:      General: No focal deficit present.      Mental Status: He is alert. Mental status is at baseline.      Comments: Patient slightly tremulous on exam.       Results Reviewed       None            No orders to display       Procedures    ED Medication and Procedure Management   Prior to Admission Medications   Prescriptions Last Dose Informant Patient Reported? Taking?   Blood Glucose Monitoring Suppl (ONE TOUCH ULTRA MINI) w/Device KIT  Self Yes No   Sig: Use as directed   Patient not taking: Reported on 6/5/2025   Blood Pressure Monitoring (Adult Blood Pressure Cuff Lg) KIT   No No   Sig: Use in the morning   Patient not taking: Reported on 6/5/2025   ONETOUCH DELICA LANCETS FINE MISC  Self Yes No   Sig: in the morning and in the evening and before bedtime. Test.   Patient not taking: Reported on 6/5/2025   Thiamine Mononitrate (VITAMIN B1) 100 mg tablet   No No   Sig: Take 1 tablet (100 mg total) by mouth in the morning.   albuterol (2.5 mg/3 mL) 0.083 % nebulizer solution   No No   Sig: Take 3 mL (2.5 mg total) by nebulization 3 (three) times a day   albuterol (PROVENTIL HFA,VENTOLIN HFA) 90 mcg/act inhaler   No No   Sig: Inhale 2 puffs every 6 (six) hours as needed for wheezing or shortness of breath   apixaban (Eliquis) 5 mg   No No   Sig: Take 1 tablet (5 mg total) by mouth in the morning and 1 tablet (5 mg total) in the evening.   aspirin 81 mg chewable tablet   No No   Sig: Chew 1 tablet (81 mg total) in the morning.   atorvastatin (LIPITOR) 40 mg tablet   No  No   Sig: Take 1 tablet (40 mg total) by mouth in the morning.   diltiazem (CARDIZEM SR) 120 mg 12 hr capsule   No No   Sig: Take 1 capsule (120 mg total) by mouth in the morning and 1 capsule (120 mg total) in the evening.   ferrous sulfate 325 (65 Fe) mg tablet   No No   Sig: Take 1 tablet (325 mg total) by mouth daily with breakfast   fluticasone-vilanterol 200-25 mcg/actuation inhaler   No No   Sig: Inhale 1 puff in the morning. Rinse mouth after use.   folic acid (FOLVITE) 400 mcg tablet   No No   Sig: Take 1 tablet (400 mcg total) by mouth in the morning.   gabapentin (NEURONTIN) 300 mg capsule   No No   Sig: Take 1 capsule (300 mg total) by mouth in the morning and 1 capsule (300 mg total) in the evening and 1 capsule (300 mg total) before bedtime.   guaiFENesin (MUCINEX) 600 mg 12 hr tablet   No No   Sig: Take 1 tablet (600 mg total) by mouth every 12 (twelve) hours for 14 days   metoprolol succinate (TOPROL-XL) 100 mg 24 hr tablet   No No   Sig: Take 1 tablet (100 mg total) by mouth daily   pantoprazole (PROTONIX) 40 mg tablet   No No   Sig: Take 1 tablet (40 mg total) by mouth in the morning.   ranolazine (RANEXA) 500 mg 12 hr tablet   No No   Sig: Take 1 tablet (500 mg total) by mouth in the morning and 1 tablet (500 mg total) in the evening.   saccharomyces boulardii (FLORASTOR) 250 mg capsule   No No   Sig: Take 1 capsule (250 mg total) by mouth in the morning and 1 capsule (250 mg total) in the evening.   tamsulosin (FLOMAX) 0.4 mg   No No   Sig: Take 1 capsule (0.4 mg total) by mouth daily with dinner   torsemide (DEMADEX) 10 mg tablet   No No   Sig: Take 1 tablet (10 mg total) by mouth every other day      Facility-Administered Medications: None     Discharge Medication List as of 6/23/2025  7:38 AM        CONTINUE these medications which have NOT CHANGED    Details   albuterol (2.5 mg/3 mL) 0.083 % nebulizer solution Take 3 mL (2.5 mg total) by nebulization 3 (three) times a day, Starting Thu  6/12/2025, Normal      albuterol (PROVENTIL HFA,VENTOLIN HFA) 90 mcg/act inhaler Inhale 2 puffs every 6 (six) hours as needed for wheezing or shortness of breath, Starting Thu 6/12/2025, Normal      apixaban (Eliquis) 5 mg Take 1 tablet (5 mg total) by mouth in the morning and 1 tablet (5 mg total) in the evening., Starting Thu 6/12/2025, Normal      aspirin 81 mg chewable tablet Chew 1 tablet (81 mg total) in the morning., Starting Thu 6/12/2025, Normal      atorvastatin (LIPITOR) 40 mg tablet Take 1 tablet (40 mg total) by mouth in the morning., Starting Thu 6/12/2025, Normal      Blood Glucose Monitoring Suppl (ONE TOUCH ULTRA MINI) w/Device KIT Use as directed, Starting Thu 5/16/2019, Historical Med      Blood Pressure Monitoring (Adult Blood Pressure Cuff Lg) KIT Use in the morning, Starting Thu 12/14/2023, Normal      diltiazem (CARDIZEM SR) 120 mg 12 hr capsule Take 1 capsule (120 mg total) by mouth in the morning and 1 capsule (120 mg total) in the evening., Starting Thu 6/12/2025, Normal      ferrous sulfate 325 (65 Fe) mg tablet Take 1 tablet (325 mg total) by mouth daily with breakfast, Starting Thu 6/12/2025, Normal      fluticasone-vilanterol 200-25 mcg/actuation inhaler Inhale 1 puff in the morning. Rinse mouth after use., Starting Thu 6/12/2025, Normal      folic acid (FOLVITE) 400 mcg tablet Take 1 tablet (400 mcg total) by mouth in the morning., Starting Thu 6/12/2025, Normal      gabapentin (NEURONTIN) 300 mg capsule Take 1 capsule (300 mg total) by mouth in the morning and 1 capsule (300 mg total) in the evening and 1 capsule (300 mg total) before bedtime., Starting Thu 6/12/2025, Normal      guaiFENesin (MUCINEX) 600 mg 12 hr tablet Take 1 tablet (600 mg total) by mouth every 12 (twelve) hours for 14 days, Starting Thu 6/12/2025, Until Thu 6/26/2025, Normal      metoprolol succinate (TOPROL-XL) 100 mg 24 hr tablet Take 1 tablet (100 mg total) by mouth daily, Starting Thu 6/12/2025, Normal       ONETIFFANY DELICA LANCETS FINE MISC in the morning and in the evening and before bedtime. Test., Starting Thu 5/16/2019, Historical Med      pantoprazole (PROTONIX) 40 mg tablet Take 1 tablet (40 mg total) by mouth in the morning., Starting Thu 6/12/2025, Normal      ranolazine (RANEXA) 500 mg 12 hr tablet Take 1 tablet (500 mg total) by mouth in the morning and 1 tablet (500 mg total) in the evening., Starting Thu 6/12/2025, Normal      saccharomyces boulardii (FLORASTOR) 250 mg capsule Take 1 capsule (250 mg total) by mouth in the morning and 1 capsule (250 mg total) in the evening., Starting Thu 6/12/2025, Normal      tamsulosin (FLOMAX) 0.4 mg Take 1 capsule (0.4 mg total) by mouth daily with dinner, Starting Thu 6/12/2025, Normal      Thiamine Mononitrate (VITAMIN B1) 100 mg tablet Take 1 tablet (100 mg total) by mouth in the morning., Starting Thu 6/12/2025, No Print      torsemide (DEMADEX) 10 mg tablet Take 1 tablet (10 mg total) by mouth every other day, Starting Thu 6/12/2025, Until Sat 7/12/2025, Normal           No discharge procedures on file.  ED SEPSIS DOCUMENTATION   Time reflects when diagnosis was documented in both MDM as applicable and the Disposition within this note       Time User Action Codes Description Comment    6/23/2025  7:28 AM Gregg Fernández [W19.XXXA] Fall, initial encounter     6/23/2025  7:29 AM Gregg Fernández [Z59.89] Living accommodation issues     6/23/2025  7:29 AM Gregg Fernández [F10.939] Alcohol withdrawal (HCC)                    [1]   Past Medical History:  Diagnosis Date    Acute encephalopathy 06/07/2025    Acute on chronic kidney failure  (HCC)     Alcohol abuse     Alcohol withdrawal (HCC) 10/09/2018    Atrial fibrillation (HCC)     Cancer (HCC)     prostate ca,had radiation    Cardiac disease     stents,then triple bypass    COPD (chronic obstructive pulmonary disease) (HCC)     Coronary artery disease     Elevated troponin 09/28/2023    ETOH abuse      "Heart failure (HCC)     History of heart surgery     says [facility]    Hx of heart artery stent     2014    Hyperlipidemia     Hypertension     Hypoglycemia 06/05/2025    Hyponatremia 10/17/2019    Hypovolemic shock (HCC) 12/22/2019    Increased anion gap 04/21/2021    Lumbar spondylitis ([facility]) 10/13/2022    Metabolic acidosis, increased anion gap 04/21/2021    Nasal bone fracture 10/10/2022    Prostate CA ([facility])     S/P CABG x 3     2004    Sleep apnea    [2]   Past Surgical History:  Procedure Laterality Date    CARDIAC CATHETERIZATION      2 stents    CORONARY ARTERY BYPASS GRAFT      CORONARY ARTERY BYPASS GRAFT  2004    [place] ARTHRD ANT INTERBODY MIN DSC CRV BELOW C2 N/A 12/16/2020    Procedure: Anterior cervical discectomy with fusion C4-C7; Posterior cervical decompression and fusion C2-T2;  Surgeon: David Rowell MD;  Location: [facility];  Service: Neurosurgery    TONSILLECTOMY     [3]   Family History  Problem Relation Name Age of Onset    Diabetes Mother      Uterine cancer Mother      COPD Father      Hypertension Father     [4]   Social History  Tobacco Use    Smoking status: Every Day     Current packs/day: 1.50     Average packs/day: 1.5 packs/day for 40.0 years (60.0 ttl pk-yrs)     Types: Cigarettes    Smokeless tobacco: Never   Vaping Use    Vaping status: Never Used   Substance Use Topics    Alcohol use: Yes     Alcohol/week: 4.0 standard drinks of alcohol     Types: 4 Standard drinks or equivalent per week     Comment: \"quart a day\"    Drug use: No        Gregg Fernández MD  06/24/25 1700    "

## 2025-06-23 NOTE — QUICK NOTE
- Patient came into ED with hypoglycemia which was treated with IV dextrose and regular diet  - Blood glucose responded appropriately  - Recommend discontinuing T2DM medications/ home metformin and following up with PCP  - Patient was able to ambulate with a walker, which is his baseline  - He is clinically sober  - Discharge home

## 2025-06-23 NOTE — ED ATTENDING ATTESTATION
6/23/2025  ISage DO, saw and evaluated the patient. I have discussed the patient with the resident/non-physician practitioner and agree with the resident's/non-physician practitioner's findings, Plan of Care, and MDM as documented in the resident's/non-physician practitioner's note, except where noted. All available labs and Radiology studies were reviewed.  I was present for key portions of any procedure(s) performed by the resident/non-physician practitioner and I was immediately available to provide assistance.       At this point I agree with the current assessment done in the Emergency Department.  I have conducted an independent evaluation of this patient a history and physical is as follows:    Patient is a 65-year-old male with a history of alcohol use disorder, COPD, CHF, A-fib, diabetes who presents after recent discharge with inability to get into his apartment.  Patient was seen last evening as a trauma patient.  He had a fall with alcohol intoxication.  He had negative imaging and was able to ambulate with a walker.  He was discharged by the trauma service.  He was transported to his apartment building but was unable to get into his apartment because his keys were inside of his apartment and no one was there to let him in.  Patient was brought back to the emergency department where he sat in the waiting room overnight due to high volumes within the emergency department.  He was eventually brought back to a ED bed and patient has no complaints.  He denies chest pain, shortness of breath.  He states that he wears oxygen periodically at home and denies any respiratory distress at this time.  He does admit to drinking daily and his last drink was yesterday.  He states that he lives on the fifth floor but there is an elevator and he has used a walker previously.  He feels comfortable ambulating with his walker and has no additional complaints at this time.    On exam, patient is in no acute  "distress.  Heart is tachycardic, regular rhythm.  Breath sounds decreased diffusely.  Abdomen is soft, nontender, nondistended.  No rebound or guarding.  No lower extremity edema.  Patient has essential tremor in his upper extremities bilaterally.  Mild tongue fasciculations.  Pupils equally round and reactive to light.  Skin dry without diaphoresis.  No hallucinations or delusions.    Patient was offered resources for alcohol use disorder, including CATCH and inpatient detox treatment.  Patient declines and states that he just wants to go home.  He declines any resources in the emergency department.  I do believe that he is in mild alcohol withdrawal but states that he will likely drink alcohol when he returns home.  Nurse is contacting his apartment building to ensure that staff is present to let him into the building.    Patient will take home medications when he returns home.  Discussed return to the emergency department if he develops chest pain, shortness of breath or signs/symptoms of worsening alcohol withdrawal, including but not limited to tremors, vomiting, headache, anxiety, delusions, seizure, other concerns.  Patient agrees to follow-up with PCP.  Will discharge to his apartment.    Portions of the above record have been created with voice recognition software.  Occasional wrong word or \"sound alike\" substitutions may have occurred due to the inherent limitations of voice recognition software.  Read the chart carefully and recognize, using context, where substitutions may have occurred.      ED Course         Critical Care Time  Procedures      "

## 2025-06-24 ENCOUNTER — HOSPITAL ENCOUNTER (EMERGENCY)
Facility: HOSPITAL | Age: 65
Discharge: HOME | End: 2025-06-24
Attending: EMERGENCY MEDICINE | Admitting: EMERGENCY MEDICINE
Payer: COMMERCIAL

## 2025-06-24 ENCOUNTER — APPOINTMENT (EMERGENCY)
Dept: RADIOLOGY | Facility: HOSPITAL | Age: 65
End: 2025-06-24
Payer: COMMERCIAL

## 2025-06-24 VITALS
HEART RATE: 107 BPM | DIASTOLIC BLOOD PRESSURE: 57 MMHG | SYSTOLIC BLOOD PRESSURE: 101 MMHG | OXYGEN SATURATION: 97 % | TEMPERATURE: 98.5 F | RESPIRATION RATE: 16 BRPM

## 2025-06-24 DIAGNOSIS — I48.91 ATRIAL FIBRILLATION (HCC): ICD-10-CM

## 2025-06-24 DIAGNOSIS — R07.9 CHEST PAIN: Primary | ICD-10-CM

## 2025-06-24 DIAGNOSIS — F10.929 ALCOHOL INTOXICATION (HCC): ICD-10-CM

## 2025-06-24 LAB
2HR DELTA HS TROPONIN: -2 NG/L
ALBUMIN SERPL BCG-MCNC: 3.4 G/DL (ref 3.5–5)
ALP SERPL-CCNC: 139 U/L (ref 34–104)
ALT SERPL W P-5'-P-CCNC: 34 U/L (ref 7–52)
ANION GAP SERPL CALCULATED.3IONS-SCNC: 19 MMOL/L (ref 4–13)
AST SERPL W P-5'-P-CCNC: 82 U/L (ref 13–39)
BASOPHILS # BLD AUTO: 0.15 THOUSANDS/ÂΜL (ref 0–0.1)
BASOPHILS NFR BLD AUTO: 5 % (ref 0–1)
BILIRUB SERPL-MCNC: 0.65 MG/DL (ref 0.2–1)
BUN SERPL-MCNC: 13 MG/DL (ref 5–25)
CALCIUM ALBUM COR SERPL-MCNC: 8.2 MG/DL (ref 8.3–10.1)
CALCIUM SERPL-MCNC: 7.7 MG/DL (ref 8.4–10.2)
CARDIAC TROPONIN I PNL SERPL HS: 37 NG/L (ref ?–50)
CARDIAC TROPONIN I PNL SERPL HS: 39 NG/L (ref ?–50)
CHLORIDE SERPL-SCNC: 102 MMOL/L (ref 96–108)
CO2 SERPL-SCNC: 21 MMOL/L (ref 21–32)
CREAT SERPL-MCNC: 0.96 MG/DL (ref 0.6–1.3)
EOSINOPHIL # BLD AUTO: 0.02 THOUSAND/ÂΜL (ref 0–0.61)
EOSINOPHIL NFR BLD AUTO: 1 % (ref 0–6)
ERYTHROCYTE [DISTWIDTH] IN BLOOD BY AUTOMATED COUNT: 17.9 % (ref 11.6–15.1)
GFR SERPL CREATININE-BSD FRML MDRD: 83 ML/MIN/1.73SQ M
GLUCOSE SERPL-MCNC: 69 MG/DL (ref 65–140)
HCT VFR BLD AUTO: 34.4 % (ref 36.5–49.3)
HGB BLD-MCNC: 10.8 G/DL (ref 12–17)
IMM GRANULOCYTES # BLD AUTO: 0.02 THOUSAND/UL (ref 0–0.2)
IMM GRANULOCYTES NFR BLD AUTO: 1 % (ref 0–2)
LIPASE SERPL-CCNC: 33 U/L (ref 11–82)
LYMPHOCYTES # BLD AUTO: 0.99 THOUSANDS/ÂΜL (ref 0.6–4.47)
LYMPHOCYTES NFR BLD AUTO: 31 % (ref 14–44)
MAGNESIUM SERPL-MCNC: 1.3 MG/DL (ref 1.9–2.7)
MCH RBC QN AUTO: 30.5 PG (ref 26.8–34.3)
MCHC RBC AUTO-ENTMCNC: 31.4 G/DL (ref 31.4–37.4)
MCV RBC AUTO: 97 FL (ref 82–98)
MONOCYTES # BLD AUTO: 0.22 THOUSAND/ÂΜL (ref 0.17–1.22)
MONOCYTES NFR BLD AUTO: 7 % (ref 4–12)
NEUTROPHILS # BLD AUTO: 1.84 THOUSANDS/ÂΜL (ref 1.85–7.62)
NEUTS SEG NFR BLD AUTO: 55 % (ref 43–75)
NRBC BLD AUTO-RTO: 0 /100 WBCS
PLATELET # BLD AUTO: 131 THOUSANDS/UL (ref 149–390)
PMV BLD AUTO: 11.1 FL (ref 8.9–12.7)
POTASSIUM SERPL-SCNC: 4 MMOL/L (ref 3.5–5.3)
PROT SERPL-MCNC: 6.8 G/DL (ref 6.4–8.4)
RBC # BLD AUTO: 3.54 MILLION/UL (ref 3.88–5.62)
SODIUM SERPL-SCNC: 142 MMOL/L (ref 135–147)
WBC # BLD AUTO: 3.24 THOUSAND/UL (ref 4.31–10.16)

## 2025-06-24 PROCEDURE — 71045 X-RAY EXAM CHEST 1 VIEW: CPT

## 2025-06-24 PROCEDURE — 83735 ASSAY OF MAGNESIUM: CPT | Performed by: EMERGENCY MEDICINE

## 2025-06-24 PROCEDURE — 96365 THER/PROPH/DIAG IV INF INIT: CPT

## 2025-06-24 PROCEDURE — 84484 ASSAY OF TROPONIN QUANT: CPT | Performed by: EMERGENCY MEDICINE

## 2025-06-24 PROCEDURE — 83690 ASSAY OF LIPASE: CPT | Performed by: EMERGENCY MEDICINE

## 2025-06-24 PROCEDURE — 99291 CRITICAL CARE FIRST HOUR: CPT | Performed by: EMERGENCY MEDICINE

## 2025-06-24 PROCEDURE — 96375 TX/PRO/DX INJ NEW DRUG ADDON: CPT

## 2025-06-24 PROCEDURE — 99285 EMERGENCY DEPT VISIT HI MDM: CPT

## 2025-06-24 PROCEDURE — 36415 COLL VENOUS BLD VENIPUNCTURE: CPT | Performed by: EMERGENCY MEDICINE

## 2025-06-24 PROCEDURE — 93005 ELECTROCARDIOGRAM TRACING: CPT

## 2025-06-24 PROCEDURE — 80053 COMPREHEN METABOLIC PANEL: CPT | Performed by: EMERGENCY MEDICINE

## 2025-06-24 PROCEDURE — 96361 HYDRATE IV INFUSION ADD-ON: CPT

## 2025-06-24 PROCEDURE — 85025 COMPLETE CBC W/AUTO DIFF WBC: CPT | Performed by: EMERGENCY MEDICINE

## 2025-06-24 RX ORDER — METOPROLOL SUCCINATE 50 MG/1
100 TABLET, EXTENDED RELEASE ORAL ONCE
Status: COMPLETED | OUTPATIENT
Start: 2025-06-24 | End: 2025-06-24

## 2025-06-24 RX ORDER — ACETAMINOPHEN 325 MG/1
650 TABLET ORAL ONCE
Status: COMPLETED | OUTPATIENT
Start: 2025-06-24 | End: 2025-06-24

## 2025-06-24 RX ORDER — METOPROLOL TARTRATE 1 MG/ML
5 INJECTION, SOLUTION INTRAVENOUS ONCE
Status: COMPLETED | OUTPATIENT
Start: 2025-06-24 | End: 2025-06-24

## 2025-06-24 RX ORDER — MAGNESIUM SULFATE HEPTAHYDRATE 40 MG/ML
2 INJECTION, SOLUTION INTRAVENOUS ONCE
Status: COMPLETED | OUTPATIENT
Start: 2025-06-24 | End: 2025-06-24

## 2025-06-24 RX ADMIN — SODIUM CHLORIDE 1000 ML: 0.9 INJECTION, SOLUTION INTRAVENOUS at 10:39

## 2025-06-24 RX ADMIN — SODIUM CHLORIDE, SODIUM LACTATE, POTASSIUM CHLORIDE, AND CALCIUM CHLORIDE 1000 ML: .6; .31; .03; .02 INJECTION, SOLUTION INTRAVENOUS at 12:35

## 2025-06-24 RX ADMIN — METOPROLOL SUCCINATE 100 MG: 50 TABLET, EXTENDED RELEASE ORAL at 13:47

## 2025-06-24 RX ADMIN — ACETAMINOPHEN 650 MG: 325 TABLET, FILM COATED ORAL at 13:48

## 2025-06-24 RX ADMIN — MAGNESIUM SULFATE HEPTAHYDRATE 2 G: 40 INJECTION, SOLUTION INTRAVENOUS at 11:27

## 2025-06-24 RX ADMIN — METOPROLOL TARTRATE 5 MG: 5 INJECTION INTRAVENOUS at 13:31

## 2025-06-24 NOTE — ED PROVIDER NOTES
Time reflects when diagnosis was documented in both MDM as applicable and the Disposition within this note       Time User Action Codes Description Comment    6/24/2025  2:35 PM Quinn Krishna [R07.9] Chest pain     6/24/2025  2:35 PM Quinn Krishna [I48.91] Atrial fibrillation (HCC)     6/24/2025  2:35 PM Quinn Krishna [F10.929] Alcohol intoxication (HCC)           ED Disposition       ED Disposition   Discharge    Condition   Stable    Date/Time   Tue Jun 24, 2025  2:35 PM    Comment   Gregg Partida discharge to home/self care.                   Assessment & Plan       Medical Decision Making  Pulse ox 91% on room air indicating adequate oxygenation.  CXR: NAD as read by me      Differential diagnose include but not limited to arrhythmia, ACS, musculoskeletal pain, medication noncompliance, alcohol intoxication      Rate controlled achieved with IV fluids 1 dose of IV metoprolol followed by his p.o. home dose.  Patient was clinically sober at time of discharge and able to ambulate on his own.  Encouraged to follow-up as an outpatient.    Critical Care Time Statement: Upon my evaluation, this patient had a high probability of imminent or life-threatening deterioration due to atrial fibrillation with rapid ventricular response, which required my direct attention, intervention, and personal management.  I spent a total of 40 minutes directly providing critical care services, including interpretation of complex medical databases, evaluating for the presence of life-threatening injuries or illnesses, management of organ system failure(s) , complex medical decision making (to support/prevent further life-threatening deterioration)., and interpretation of hemodynamic data. This time is exclusive of procedures, teaching, treating other patients, family meetings, and any prior time recorded by providers other than myself.       Amount and/or Complexity of Data Reviewed  Labs: ordered.  Radiology: ordered and  independent interpretation performed.  ECG/medicine tests: ordered and independent interpretation performed.    Risk  OTC drugs.  Prescription drug management.             Medications   sodium chloride 0.9 % bolus 1,000 mL (0 mL Intravenous Stopped 6/24/25 1234)   magnesium sulfate 2 g/50 mL IVPB (premix) 2 g (0 g Intravenous Stopped 6/24/25 1235)   lactated ringers bolus 1,000 mL (0 mL Intravenous Stopped 6/24/25 1335)   metoprolol (LOPRESSOR) injection 5 mg (5 mg Intravenous Given 6/24/25 1331)   metoprolol succinate (TOPROL-XL) 24 hr tablet 100 mg (100 mg Oral Given 6/24/25 1347)   acetaminophen (TYLENOL) tablet 650 mg (650 mg Oral Given 6/24/25 1348)       ED Risk Strat Scores   HEART Risk Score      Flowsheet Row Most Recent Value   Heart Score Risk Calculator    History 0 Filed at: 06/24/2025 1603   ECG 1 Filed at: 06/24/2025 1603   Age 1 Filed at: 06/24/2025 1603   Risk Factors 2 Filed at: 06/24/2025 1603   Troponin 0 Filed at: 06/24/2025 1603   HEART Score 4 Filed at: 06/24/2025 1603          HEART Risk Score      Flowsheet Row Most Recent Value   Heart Score Risk Calculator    History 0 Filed at: 06/24/2025 1603   ECG 1 Filed at: 06/24/2025 1603   Age 1 Filed at: 06/24/2025 1603   Risk Factors 2 Filed at: 06/24/2025 1603   Troponin 0 Filed at: 06/24/2025 1603   HEART Score 4 Filed at: 06/24/2025 1603                      No data recorded        SBIRT 22yo+      Flowsheet Row Most Recent Value   Initial Alcohol Screen: US AUDIT-C     1. How often do you have a drink containing alcohol? 6 Filed at: 06/24/2025 1040   2. How many drinks containing alcohol do you have on a typical day you are drinking?  5 Filed at: 06/24/2025 1040   3a. Male UNDER 65: How often do you have five or more drinks on one occasion? 6 Filed at: 06/24/2025 1040   Audit-C Score 17 Filed at: 06/24/2025 1040   Full Alcohol Screen: US AUDIT    4. How often during the last year have you found that you were not able to stop drinking once you  "had started? 4 Filed at: 06/24/2025 1040   5. How often during past year have you failed to do what was normally expected of you because of drinking?  4 Filed at: 06/24/2025 1040   6. How often in past year have you needed a first drink in the morning to get yourself going after a heavy drinking session?  4 Filed at: 06/24/2025 1040   7. How often in past year have you had feeling of guilt or remorse after drinking?  4 Filed at: 06/24/2025 1040   8. How often in past year have you been unable to remember what happened night before because you had been drinking?  4 Filed at: 06/24/2025 1040   9. Have you or someone else been injured as a result of your drinking?  4 Filed at: 06/24/2025 1040   10. Has a relative, friend, doctor or other health worker been concerned about your drinking and suggested you cut down?  4 Filed at: 06/24/2025 1040   AUDIT Total Score 45 Filed at: 06/24/2025 1040   BRIGIDA: How many times in the past year have you...    Used an illegal drug or used a prescription medication for non-medical reasons? Never Filed at: 06/24/2025 1040                            History of Present Illness       Chief Complaint   Patient presents with    Chest Pain     Patient arrives via BLS - patient states \"I don't feel well\".  Patient unable to elaborate further other than he is having chest pain.  Patient arrived disheveled, in ripped paper scrubs, and soiled with stool and urine.  Patient admits to drinking ETOH today.       Past Medical History[1]   Past Surgical History[2]   Family History[3]   Social History[4]   E-Cigarette/Vaping    E-Cigarette Use Never User       E-Cigarette/Vaping Substances    Nicotine Yes     THC No     CBD No     Flavoring No     Other No     Unknown No       I have reviewed and agree with the history as documented.     Patient brought in by EMS for evaluation of the complaint that he does not feel well.  He also states he has some chest pain.  Patient arrives in paper scrubs very " disheveled soiled in his own urine and stool.  Admits to drinking today.      History provided by:  Patient and EMS personnel   used: No    Chest Pain  Associated symptoms: fatigue        Review of Systems   Constitutional:  Positive for fatigue.   Cardiovascular:  Positive for chest pain.   All other systems reviewed and are negative.          Objective       ED Triage Vitals   Temperature Pulse Blood Pressure Respirations SpO2 Patient Position - Orthostatic VS   06/24/25 0955 06/24/25 0955 06/24/25 0955 06/24/25 0955 06/24/25 0955 06/24/25 0955   98.5 °F (36.9 °C) 86 108/64 20 91 % Lying      Temp Source Heart Rate Source BP Location FiO2 (%) Pain Score    06/24/25 0955 06/24/25 0955 06/24/25 0955 -- 06/24/25 1339    Oral Monitor Right arm  8      Vitals      Date and Time Temp Pulse SpO2 Resp BP Pain Score FACES Pain Rating User   06/24/25 1438 -- 107 -- -- 101/57 -- -- [facility]   06/24/25 1408 -- -- 97 % -- 132/68 -- -- [facility]   06/24/25 1348 -- -- -- -- -- 8 --    06/24/25 1347 -- 119 -- -- 126/74 -- --    06/24/25 1339 -- 104 99 % 16 126/74 8 --    06/24/25 1330 -- 124 99 % 16 123/76 -- --    06/24/25 1235 -- 121 99 % -- 121/82 -- -- [facility]   06/24/25 1220 -- 123 98 % 23 114/81 -- -- [facility]   06/24/25 1205 -- 118 99 % 24 130/86 -- -- [facility]   06/24/25 1157 -- 106 99 % 18 122/79 -- -- [facility]   06/24/25 1142 -- 130 99 % -- 123/72 -- -- [facility]   06/24/25 1115 -- 116 98 % 20 119/81 -- --    06/24/25 1032 -- 126 93 % 18 97/59 -- -- [facility]   06/24/25 0955 98.5 °F (36.9 °C) 86 91 % 20 108/64 -- -- [facility]            Physical Exam  Vitals and nursing note reviewed.   Constitutional:       General: He is not in acute distress.  HENT:      Head: Atraumatic.     Eyes:      Conjunctiva/sclera: Conjunctivae normal.       Cardiovascular:      Rate and Rhythm: Tachycardia present. Rhythm irregular.   Pulmonary:      Effort: Pulmonary effort is normal. No respiratory distress.      Breath sounds: Normal breath sounds.      Neurological:      General: No focal deficit present.      Mental Status: He is alert and oriented to person, place, and time.         Results Reviewed       Procedure Component Value Units Date/Time    HS Troponin I 2hr [[id number]]  (Normal) Collected: 06/24/25 1237    Lab Status: Final result Specimen: Blood from Arm, Left Updated: 06/24/25 1317     hs TnI 2hr 37 ng/L      Delta 2hr hsTnI -2 ng/L     Comprehensive metabolic panel [[id number]]  (Abnormal) Collected: 06/24/25 1035    Lab Status: Final result Specimen: Blood from Arm, Left Updated: 06/24/25 1108     Sodium 142 mmol/L      Potassium 4.0 mmol/L      Chloride 102 mmol/L      CO2 21 mmol/L      ANION GAP 19 mmol/L      BUN 13 mg/dL      Creatinine 0.96 mg/dL      Glucose 69 mg/dL      Calcium 7.7 mg/dL      Corrected Calcium 8.2 mg/dL      AST 82 U/L      ALT 34 U/L      Alkaline Phosphatase 139 U/L      Total Protein 6.8 g/dL      Albumin 3.4 g/dL      Total Bilirubin 0.65 mg/dL      eGFR 83 ml/min/1.73sq m     Narrative:      [employer] guidelines for Chronic Kidney Disease (CKD):     Stage 1 with normal or high GFR (GFR > 90 mL/min/1.73 square meters)    Stage 2 Mild CKD (GFR = 60-89 mL/min/1.73 square meters)    Stage 3A Moderate CKD (GFR = 45-59 mL/min/1.73 square meters)    Stage 3B Moderate CKD (GFR = 30-44 mL/min/1.73 square meters)    Stage 4 Severe CKD (GFR = 15-29 mL/min/1.73 square meters)    Stage 5 End Stage CKD (GFR <15 mL/min/1.73 square meters)  Note: GFR calculation is accurate only with a steady state creatinine    Lipase [[id number]]  (Normal) Collected: 06/24/25 1035    Lab Status: Final result Specimen: Blood from Arm, Left Updated: 06/24/25 1108     Lipase 33 u/L     Magnesium [[id number]]  (Abnormal) Collected: 06/24/25 1035    Lab Status: Final result Specimen: Blood from Arm, Left Updated: 06/24/25 1108     Magnesium 1.3 mg/dL     HS Troponin 0hr (reflex protocol) [[id number]]  (Normal) Collected:  06/24/25 1035    Lab Status: Final result Specimen: Blood from Arm, Left Updated: 06/24/25 1104     hs TnI 0hr 39 ng/L     CBC and differential [[id number]]  (Abnormal) Collected: 06/24/25 1035    Lab Status: Final result Specimen: Blood from Arm, Left Updated: 06/24/25 1041     WBC 3.24 Thousand/uL      RBC 3.54 Million/uL      Hemoglobin 10.8 g/dL      Hematocrit 34.4 %      MCV 97 fL      MCH 30.5 pg      MCHC 31.4 g/dL      RDW 17.9 %      MPV 11.1 fL      Platelets 131 Thousands/uL      nRBC 0 /100 WBCs      Segmented % 55 %      Immature Grans % 1 %      Lymphocytes % 31 %      Monocytes % 7 %      Eosinophils Relative 1 %      Basophils Relative 5 %      Absolute Neutrophils 1.84 Thousands/µL      Absolute Immature Grans 0.02 Thousand/uL      Absolute Lymphocytes 0.99 Thousands/µL      Absolute Monocytes 0.22 Thousand/µL      Eosinophils Absolute 0.02 Thousand/µL      Basophils Absolute 0.15 Thousands/µL             XR chest 1 view portable    (Results Pending)       ECG 12 Lead Documentation Only    Date/Time: 6/24/2025 10:04 AM    Performed by: Quinn Krishna DO  Authorized by: Quinn Krishna DO    ECG reviewed by me, the ED Provider: yes    Patient location:  ED  Interpretation:     Interpretation: abnormal    Rate:     ECG rate:  124    ECG rate assessment: tachycardic    Rhythm:     Rhythm: atrial fibrillation    Ectopy:     Ectopy: none    QRS:     QRS axis:  Right  ST segments:     ST segments:  Non-specific  T waves:     T waves: non-specific        ED Medication and Procedure Management   Prior to Admission Medications   Prescriptions Last Dose Informant Patient Reported? Taking?   Blood Glucose Monitoring Suppl (ONE TOUCH ULTRA MINI) w/Device KIT  Self Yes No   Sig: Use as directed   Patient not taking: Reported on 6/5/2025   Blood Pressure Monitoring (Adult Blood Pressure Cuff Lg) KIT   No No   Sig: Use in the morning   Patient not taking: Reported on 6/5/2025   ONETOUCH DELICA LANCETS FINE [facility]   Self Yes No   Sig: in the morning and in the evening and before bedtime. Test.   Patient not taking: Reported on 6/5/2025   Thiamine Mononitrate (VITAMIN B1) 100 mg tablet   No No   Sig: Take 1 tablet (100 mg total) by mouth in the morning.   albuterol (2.5 mg/3 mL) 0.083 % nebulizer solution   No No   Sig: Take 3 mL (2.5 mg total) by nebulization 3 (three) times a day   albuterol (PROVENTIL HFA,VENTOLIN HFA) 90 mcg/act inhaler   No No   Sig: Inhale 2 puffs every 6 (six) hours as needed for wheezing or shortness of breath   apixaban (Eliquis) 5 mg   No No   Sig: Take 1 tablet (5 mg total) by mouth in the morning and 1 tablet (5 mg total) in the evening.   aspirin 81 mg chewable tablet   No No   Sig: Chew 1 tablet (81 mg total) in the morning.   atorvastatin (LIPITOR) 40 mg tablet   No No   Sig: Take 1 tablet (40 mg total) by mouth in the morning.   diltiazem (CARDIZEM SR) 120 mg 12 hr capsule   No No   Sig: Take 1 capsule (120 mg total) by mouth in the morning and 1 capsule (120 mg total) in the evening.   ferrous sulfate 325 (65 Fe) mg tablet   No No   Sig: Take 1 tablet (325 mg total) by mouth daily with breakfast   fluticasone-vilanterol 200-25 mcg/actuation inhaler   No No   Sig: Inhale 1 puff in the morning. Rinse mouth after use.   folic acid (FOLVITE) 400 mcg tablet   No No   Sig: Take 1 tablet (400 mcg total) by mouth in the morning.   gabapentin (NEURONTIN) 300 mg capsule   No No   Sig: Take 1 capsule (300 mg total) by mouth in the morning and 1 capsule (300 mg total) in the evening and 1 capsule (300 mg total) before bedtime.   guaiFENesin (MUCINEX) 600 mg 12 hr tablet   No No   Sig: Take 1 tablet (600 mg total) by mouth every 12 (twelve) hours for 14 days   metoprolol succinate (TOPROL-XL) 100 mg 24 hr tablet   No No   Sig: Take 1 tablet (100 mg total) by mouth daily   pantoprazole (PROTONIX) 40 mg tablet   No No   Sig: Take 1 tablet (40 mg total) by mouth in the morning.   ranolazine (RANEXA) 500 mg 12  hr tablet   No No   Sig: Take 1 tablet (500 mg total) by mouth in the morning and 1 tablet (500 mg total) in the evening.   saccharomyces boulardii (FLORASTOR) 250 mg capsule   No No   Sig: Take 1 capsule (250 mg total) by mouth in the morning and 1 capsule (250 mg total) in the evening.   tamsulosin (FLOMAX) 0.4 mg   No No   Sig: Take 1 capsule (0.4 mg total) by mouth daily with dinner   torsemide (DEMADEX) 10 mg tablet   No No   Sig: Take 1 tablet (10 mg total) by mouth every other day      Facility-Administered Medications: None     Discharge Medication List as of 6/24/2025  2:36 PM        CONTINUE these medications which have NOT CHANGED    Details   albuterol (2.5 mg/3 mL) 0.083 % nebulizer solution Take 3 mL (2.5 mg total) by nebulization 3 (three) times a day, Starting Thu 6/12/2025, Normal      albuterol (PROVENTIL HFA,VENTOLIN HFA) 90 mcg/act inhaler Inhale 2 puffs every 6 (six) hours as needed for wheezing or shortness of breath, Starting Thu 6/12/2025, Normal      apixaban (Eliquis) 5 mg Take 1 tablet (5 mg total) by mouth in the morning and 1 tablet (5 mg total) in the evening., Starting Thu 6/12/2025, Normal      aspirin 81 mg chewable tablet Chew 1 tablet (81 mg total) in the morning., Starting Thu 6/12/2025, Normal      atorvastatin (LIPITOR) 40 mg tablet Take 1 tablet (40 mg total) by mouth in the morning., Starting Thu 6/12/2025, Normal      Blood Glucose Monitoring Suppl (ONE TOUCH ULTRA MINI) w/Device KIT Use as directed, Starting Thu 5/16/2019, Historical Med      Blood Pressure Monitoring (Adult Blood Pressure Cuff Lg) KIT Use in the morning, Starting Thu 12/14/2023, Normal      diltiazem (CARDIZEM SR) 120 mg 12 hr capsule Take 1 capsule (120 mg total) by mouth in the morning and 1 capsule (120 mg total) in the evening., Starting Thu 6/12/2025, Normal      ferrous sulfate 325 (65 Fe) mg tablet Take 1 tablet (325 mg total) by mouth daily with breakfast, Starting Thu 6/12/2025, Normal       fluticasone-vilanterol 200-25 mcg/actuation inhaler Inhale 1 puff in the morning. Rinse mouth after use., Starting Thu 6/12/2025, Normal      folic acid (FOLVITE) 400 mcg tablet Take 1 tablet (400 mcg total) by mouth in the morning., Starting Thu 6/12/2025, Normal      gabapentin (NEURONTIN) 300 mg capsule Take 1 capsule (300 mg total) by mouth in the morning and 1 capsule (300 mg total) in the evening and 1 capsule (300 mg total) before bedtime., Starting Thu 6/12/2025, Normal      guaiFENesin (MUCINEX) 600 mg 12 hr tablet Take 1 tablet (600 mg total) by mouth every 12 (twelve) hours for 14 days, Starting Thu 6/12/2025, Until Thu 6/26/2025, Normal      metoprolol succinate (TOPROL-XL) 100 mg 24 hr tablet Take 1 tablet (100 mg total) by mouth daily, Starting Thu 6/12/2025, Normal      ONETOUCH DELICA LANCETS FINE MISC in the morning and in the evening and before bedtime. Test., Starting Thu 5/16/2019, Historical Med      pantoprazole (PROTONIX) 40 mg tablet Take 1 tablet (40 mg total) by mouth in the morning., Starting Thu 6/12/2025, Normal      ranolazine (RANEXA) 500 mg 12 hr tablet Take 1 tablet (500 mg total) by mouth in the morning and 1 tablet (500 mg total) in the evening., Starting Thu 6/12/2025, Normal      saccharomyces boulardii (FLORASTOR) 250 mg capsule Take 1 capsule (250 mg total) by mouth in the morning and 1 capsule (250 mg total) in the evening., Starting Thu 6/12/2025, Normal      tamsulosin (FLOMAX) 0.4 mg Take 1 capsule (0.4 mg total) by mouth daily with dinner, Starting Thu 6/12/2025, Normal      Thiamine Mononitrate (VITAMIN B1) 100 mg tablet Take 1 tablet (100 mg total) by mouth in the morning., Starting Thu 6/12/2025, No Print      torsemide (DEMADEX) 10 mg tablet Take 1 tablet (10 mg total) by mouth every other day, Starting Thu 6/12/2025, Until Sat 7/12/2025, Normal             ED SEPSIS DOCUMENTATION   Time reflects when diagnosis was documented in both MDM as applicable and the  Disposition within this note       Time User Action Codes Description Comment    6/24/2025  2:35 PM Quinn Krishna [R07.9] Chest pain     6/24/2025  2:35 PM Quinn Krishna [I48.91] Atrial fibrillation (HCC)     6/24/2025  2:35 PM Quinn Krishna [F10.929] Alcohol intoxication (HCC)                      [1]   Past Medical History:  Diagnosis Date    Acute encephalopathy 06/07/2025    Acute on chronic kidney failure  (HCC)     Alcohol abuse     Alcohol withdrawal (HCC) 10/09/2018    Atrial fibrillation (HCC)     Cancer (HCC)     prostate ca,had radiation    Cardiac disease     stents,then triple bypass    COPD (chronic obstructive pulmonary disease) (HCC)     Coronary artery disease     Elevated troponin 09/28/2023    ETOH abuse     Heart failure (HCC)     History of heart surgery     says [facility] bypass [facility]    Hx of heart artery stent     2014    Hyperlipidemia     Hypertension     Hypoglycemia 06/05/2025    Hyponatremia 10/17/2019    Hypovolemic shock (HCC) 12/22/2019    Increased anion gap 04/21/2021    Lumbar spondylitis (HCC) 10/13/2022    Metabolic acidosis, increased anion gap 04/21/2021    Nasal bone fracture 10/10/2022    Prostate CA (HCC)     S/P CABG x 3     2004    Sleep apnea    [2]   Past Surgical History:  Procedure Laterality Date    CARDIAC CATHETERIZATION      2 stents    CORONARY ARTERY BYPASS GRAFT      CORONARY ARTERY BYPASS GRAFT  2004    MT ARTHRD ANT INTERBODY MIN DSC CRV BELOW C2 N/A 12/16/2020    Procedure: Anterior cervical discectomy with fusion C4-C7; Posterior cervical decompression and fusion C2-T2;  Surgeon: David Rowell MD;  Location: [facility];  Service: Neurosurgery    TONSILLECTOMY     [3]   Family History  Problem Relation Name Age of Onset    Diabetes Mother      Uterine cancer Mother      COPD Father      Hypertension Father     [4]   Social History  Tobacco Use    Smoking status: Every Day     Current packs/day: 1.50     Average packs/day: 1.5 packs/day  "for 40.0 years (60.0 ttl pk-yrs)     Types: Cigarettes    Smokeless tobacco: Never   Vaping Use    Vaping status: Never Used   Substance Use Topics    Alcohol use: Yes     Alcohol/week: 4.0 standard drinks of alcohol     Types: 4 Standard drinks or equivalent per week     Comment: \"quart a day\"    Drug use: No        Quinn Krisnha, DO  06/24/25 2274    "

## 2025-06-24 NOTE — ED NOTES
Patient alert oriented to self and place, time stated was 2026 in November. Patient denies any chest pain, shortness of breath just repeatedly asking for a sandwich. States that he drinks all day everyday. Explained patient the plan of care and no sandwich at this time. Call bell within reach. Nonskid socks on patient, both side rails up. Continuous monitors on. Multiple bruises all over patient's body in various stages of healing. No signs or symptoms of withdrawal at this time.     Yessi Velázquez RN  06/24/25 1040       Yessi Velázquez RN  06/24/25 3601

## 2025-06-25 ENCOUNTER — HOSPITAL ENCOUNTER (EMERGENCY)
Facility: HOSPITAL | Age: 65
Discharge: HOME | End: 2025-06-25
Attending: EMERGENCY MEDICINE | Admitting: EMERGENCY MEDICINE
Payer: COMMERCIAL

## 2025-06-25 VITALS
TEMPERATURE: 98.8 F | OXYGEN SATURATION: 95 % | SYSTOLIC BLOOD PRESSURE: 137 MMHG | DIASTOLIC BLOOD PRESSURE: 81 MMHG | RESPIRATION RATE: 18 BRPM | HEART RATE: 115 BPM

## 2025-06-25 DIAGNOSIS — F10.929 ALCOHOL INTOXICATION (HCC): Primary | ICD-10-CM

## 2025-06-25 LAB
2HR DELTA HS TROPONIN: 2 NG/L
4HR DELTA HS TROPONIN: -3 NG/L
ALBUMIN SERPL BCG-MCNC: 3.2 G/DL (ref 3.5–5)
ALP SERPL-CCNC: 132 U/L (ref 34–104)
ALT SERPL W P-5'-P-CCNC: 28 U/L (ref 7–52)
ANION GAP SERPL CALCULATED.3IONS-SCNC: 16 MMOL/L (ref 4–13)
AST SERPL W P-5'-P-CCNC: 57 U/L (ref 13–39)
BASOPHILS # BLD AUTO: 0.15 THOUSANDS/ÂΜL (ref 0–0.1)
BASOPHILS NFR BLD AUTO: 4 % (ref 0–1)
BILIRUB SERPL-MCNC: 0.56 MG/DL (ref 0.2–1)
BUN SERPL-MCNC: 13 MG/DL (ref 5–25)
CALCIUM ALBUM COR SERPL-MCNC: 8 MG/DL (ref 8.3–10.1)
CALCIUM SERPL-MCNC: 7.4 MG/DL (ref 8.4–10.2)
CARDIAC TROPONIN I PNL SERPL HS: 28 NG/L (ref ?–50)
CARDIAC TROPONIN I PNL SERPL HS: 31 NG/L (ref ?–50)
CARDIAC TROPONIN I PNL SERPL HS: 33 NG/L (ref ?–50)
CHLORIDE SERPL-SCNC: 105 MMOL/L (ref 96–108)
CO2 SERPL-SCNC: 22 MMOL/L (ref 21–32)
CREAT SERPL-MCNC: 0.93 MG/DL (ref 0.6–1.3)
EOSINOPHIL # BLD AUTO: 0.1 THOUSAND/ÂΜL (ref 0–0.61)
EOSINOPHIL NFR BLD AUTO: 3 % (ref 0–6)
ERYTHROCYTE [DISTWIDTH] IN BLOOD BY AUTOMATED COUNT: 18.1 % (ref 11.6–15.1)
ETHANOL SERPL-MCNC: 429 MG/DL
GFR SERPL CREATININE-BSD FRML MDRD: 87 ML/MIN/1.73SQ M
GLUCOSE SERPL-MCNC: 87 MG/DL (ref 65–140)
HCT VFR BLD AUTO: 32.3 % (ref 36.5–49.3)
HGB BLD-MCNC: 10.1 G/DL (ref 12–17)
IMM GRANULOCYTES # BLD AUTO: 0.02 THOUSAND/UL (ref 0–0.2)
IMM GRANULOCYTES NFR BLD AUTO: 1 % (ref 0–2)
LIPASE SERPL-CCNC: 45 U/L (ref 11–82)
LYMPHOCYTES # BLD AUTO: 0.92 THOUSANDS/ÂΜL (ref 0.6–4.47)
LYMPHOCYTES NFR BLD AUTO: 23 % (ref 14–44)
MAGNESIUM SERPL-MCNC: 1.5 MG/DL (ref 1.9–2.7)
MCH RBC QN AUTO: 30.8 PG (ref 26.8–34.3)
MCHC RBC AUTO-ENTMCNC: 31.3 G/DL (ref 31.4–37.4)
MCV RBC AUTO: 99 FL (ref 82–98)
MONOCYTES # BLD AUTO: 0.3 THOUSAND/ÂΜL (ref 0.17–1.22)
MONOCYTES NFR BLD AUTO: 7 % (ref 4–12)
NEUTROPHILS # BLD AUTO: 2.55 THOUSANDS/ÂΜL (ref 1.85–7.62)
NEUTS SEG NFR BLD AUTO: 62 % (ref 43–75)
NRBC BLD AUTO-RTO: 0 /100 WBCS
PLATELET # BLD AUTO: 99 THOUSANDS/UL (ref 149–390)
PLATELET BLD QL SMEAR: ABNORMAL
PMV BLD AUTO: 10.2 FL (ref 8.9–12.7)
POTASSIUM SERPL-SCNC: 3.8 MMOL/L (ref 3.5–5.3)
PROT SERPL-MCNC: 6.3 G/DL (ref 6.4–8.4)
RBC # BLD AUTO: 3.28 MILLION/UL (ref 3.88–5.62)
RBC MORPH BLD: PRESENT
SODIUM SERPL-SCNC: 143 MMOL/L (ref 135–147)
TARGETS BLD QL SMEAR: PRESENT
WBC # BLD AUTO: 4.04 THOUSAND/UL (ref 4.31–10.16)

## 2025-06-25 PROCEDURE — 36415 COLL VENOUS BLD VENIPUNCTURE: CPT | Performed by: PHYSICIAN ASSISTANT

## 2025-06-25 PROCEDURE — 93005 ELECTROCARDIOGRAM TRACING: CPT

## 2025-06-25 PROCEDURE — 84484 ASSAY OF TROPONIN QUANT: CPT | Performed by: PHYSICIAN ASSISTANT

## 2025-06-25 PROCEDURE — 83690 ASSAY OF LIPASE: CPT | Performed by: PHYSICIAN ASSISTANT

## 2025-06-25 PROCEDURE — 80053 COMPREHEN METABOLIC PANEL: CPT | Performed by: PHYSICIAN ASSISTANT

## 2025-06-25 PROCEDURE — 99284 EMERGENCY DEPT VISIT MOD MDM: CPT | Performed by: PHYSICIAN ASSISTANT

## 2025-06-25 PROCEDURE — 96361 HYDRATE IV INFUSION ADD-ON: CPT

## 2025-06-25 PROCEDURE — 85025 COMPLETE CBC W/AUTO DIFF WBC: CPT | Performed by: PHYSICIAN ASSISTANT

## 2025-06-25 PROCEDURE — 82077 ASSAY SPEC XCP UR&BREATH IA: CPT | Performed by: PHYSICIAN ASSISTANT

## 2025-06-25 PROCEDURE — 99284 EMERGENCY DEPT VISIT MOD MDM: CPT

## 2025-06-25 PROCEDURE — 96366 THER/PROPH/DIAG IV INF ADDON: CPT

## 2025-06-25 PROCEDURE — 83735 ASSAY OF MAGNESIUM: CPT | Performed by: PHYSICIAN ASSISTANT

## 2025-06-25 PROCEDURE — 96365 THER/PROPH/DIAG IV INF INIT: CPT

## 2025-06-25 RX ORDER — GABAPENTIN 300 MG/1
300 CAPSULE ORAL 3 TIMES DAILY
Status: CANCELLED | OUTPATIENT
Start: 2025-06-25

## 2025-06-25 RX ORDER — RANOLAZINE 500 MG/1
500 TABLET, EXTENDED RELEASE ORAL 2 TIMES DAILY
Status: CANCELLED | OUTPATIENT
Start: 2025-06-25

## 2025-06-25 RX ORDER — FLUTICASONE FUROATE AND VILANTEROL 200; 25 UG/1; UG/1
1 POWDER RESPIRATORY (INHALATION) DAILY
Status: CANCELLED | OUTPATIENT
Start: 2025-06-25

## 2025-06-25 RX ORDER — ALBUTEROL SULFATE 90 UG/1
2 INHALANT RESPIRATORY (INHALATION) EVERY 6 HOURS PRN
Status: CANCELLED | OUTPATIENT
Start: 2025-06-25

## 2025-06-25 RX ORDER — FERROUS SULFATE 325(65) MG
325 TABLET ORAL
Status: CANCELLED | OUTPATIENT
Start: 2025-06-26

## 2025-06-25 RX ORDER — NICOTINE 21 MG/24HR
1 PATCH, TRANSDERMAL 24 HOURS TRANSDERMAL DAILY
Status: CANCELLED | OUTPATIENT
Start: 2025-06-25

## 2025-06-25 RX ORDER — DILTIAZEM HYDROCHLORIDE 60 MG/1
120 CAPSULE, EXTENDED RELEASE ORAL 2 TIMES DAILY
Status: CANCELLED | OUTPATIENT
Start: 2025-06-25

## 2025-06-25 RX ORDER — ATORVASTATIN CALCIUM 40 MG/1
40 TABLET, FILM COATED ORAL DAILY
Status: CANCELLED | OUTPATIENT
Start: 2025-06-25

## 2025-06-25 RX ORDER — PANTOPRAZOLE SODIUM 40 MG/1
40 TABLET, DELAYED RELEASE ORAL DAILY
Status: CANCELLED | OUTPATIENT
Start: 2025-06-25

## 2025-06-25 RX ORDER — TORSEMIDE 10 MG/1
10 TABLET ORAL EVERY OTHER DAY
Status: CANCELLED | OUTPATIENT
Start: 2025-06-25

## 2025-06-25 RX ORDER — LANOLIN ALCOHOL/MO/W.PET/CERES
100 CREAM (GRAM) TOPICAL DAILY
Status: CANCELLED | OUTPATIENT
Start: 2025-06-25

## 2025-06-25 RX ORDER — FOLIC ACID 0.4 MG
400 TABLET ORAL DAILY
Status: CANCELLED | OUTPATIENT
Start: 2025-06-25

## 2025-06-25 RX ORDER — ALBUTEROL SULFATE 0.83 MG/ML
2.5 SOLUTION RESPIRATORY (INHALATION)
Status: CANCELLED | OUTPATIENT
Start: 2025-06-25

## 2025-06-25 RX ORDER — MAGNESIUM SULFATE HEPTAHYDRATE 40 MG/ML
2 INJECTION, SOLUTION INTRAVENOUS ONCE
Status: COMPLETED | OUTPATIENT
Start: 2025-06-25 | End: 2025-06-25

## 2025-06-25 RX ORDER — METOPROLOL SUCCINATE 50 MG/1
100 TABLET, EXTENDED RELEASE ORAL DAILY
Status: CANCELLED | OUTPATIENT
Start: 2025-06-25

## 2025-06-25 RX ORDER — TAMSULOSIN HYDROCHLORIDE 0.4 MG/1
0.4 CAPSULE ORAL
Status: CANCELLED | OUTPATIENT
Start: 2025-06-25

## 2025-06-25 RX ORDER — ASPIRIN 81 MG/1
81 TABLET, CHEWABLE ORAL DAILY
Status: CANCELLED | OUTPATIENT
Start: 2025-06-25

## 2025-06-25 RX ORDER — SACCHAROMYCES BOULARDII 250 MG
250 CAPSULE ORAL 2 TIMES DAILY
Status: CANCELLED | OUTPATIENT
Start: 2025-06-25

## 2025-06-25 RX ADMIN — SODIUM CHLORIDE 1000 ML: 0.9 INJECTION, SOLUTION INTRAVENOUS at 10:10

## 2025-06-25 RX ADMIN — MAGNESIUM SULFATE HEPTAHYDRATE 2 G: 40 INJECTION, SOLUTION INTRAVENOUS at 10:56

## 2025-06-25 NOTE — ASSESSMENT & PLAN NOTE
Acute alcohol intoxication in setting of chronic alcohol abuse with frequent ED visits  - ETOH 429  CIWA  IVF  Folate/thiamine

## 2025-06-25 NOTE — ASSESSMENT & PLAN NOTE
Lab Results   Component Value Date    EGFR 87 06/25/2025    EGFR 83 06/24/2025    EGFR 69 06/22/2025    CREATININE 0.93 06/25/2025    CREATININE 0.96 06/24/2025    CREATININE 1.12 06/22/2025   Chronic, stable.  Continue home torsemide 10mg every other day

## 2025-06-25 NOTE — DISCHARGE INSTRUCTIONS
Follow up with your primary care provider - [facility] - for any persistent concerns    You are encouraged to stop drinking    Return to ED for new or worsening symptoms

## 2025-06-25 NOTE — ASSESSMENT & PLAN NOTE
Chronic, 2/2 chronic alcohol abuse/alcohol cirrhosis.  - No active signs of bleeding at time of examination.   Monitor for signs of bleeding

## 2025-06-25 NOTE — ED NOTES
Patient keeps asking for urinal. Brought into the bathroom. Could not go. Bladder scan 43 ml.     Temi Patterson RN  06/25/25 2895

## 2025-06-25 NOTE — ED PROVIDER NOTES
Time reflects when diagnosis was documented in both MDM as applicable and the Disposition within this note       Time User Action Codes Description Comment    6/25/2025  6:32 PM Quinn Alvarez Add [F10.920] Alcoholic intoxication without complication (HCC)     6/25/2025  6:32 PM Quinn Alvarez Remove [F10.920] Alcoholic intoxication without complication (HCC)     6/25/2025  6:32 PM Quinn Alvarez Add [F10.929] Alcohol intoxication (HCC)           ED Disposition       ED Disposition   Discharge    Condition   Stable    Date/Time   Wed Jun 25, 2025  6:32 PM    Comment   Gregg Partida discharge to home/self care.                   Assessment & Plan       Medical Decision Making  Labs reviewed. ETOH 429. Mag 1.5. Patient given iv fluids , iv magnesium. Attempted to admit to [facility] service given elevated alcohol level above 400. Secure chatted with Dr Connors; case management looped in on thread. Given multiple prior visits for same, they preferred patient be observed to clinical sobriety in the ED then discharged home    Patient was able to ambulate in the ED and clincally sober  Uses walker but states this is at home  Will discharge home with walker   Advised f/u with PCP for any persistent concerns  Return precautions reviewed.     Amount and/or Complexity of Data Reviewed  Labs: ordered.    Risk  Prescription drug management.             Medications   sodium chloride 0.9 % bolus 1,000 mL (0 mL Intravenous Stopped 6/25/25 1224)   magnesium sulfate 2 g/50 mL IVPB (premix) 2 g (0 g Intravenous Stopped 6/25/25 1255)       ED Risk Strat Scores                    No data recorded                            History of Present Illness       Chief Complaint   Patient presents with    Alcohol Intoxication     Pt called 911 sts he didn't feel good, police arrived at the scene and pt c/o buttock pain       Past Medical History[1]   Past Surgical History[2]   Family History[3]   Social History[4]    E-Cigarette/Vaping    E-Cigarette Use Never User       E-Cigarette/Vaping Substances    Nicotine Yes     THC No     CBD No     Flavoring No     Other No     Unknown No       I have reviewed and agree with the history as documented.     Patient is a 64 yo wm with history of alcohol use disorder, atrial fibrillation, HTN, hyperlipidema, via rescue. Reportedly cPatient reports buttock pain from a fall several days ago. No other complaints         Review of Systems   Constitutional:  Negative for fever.   Respiratory:  Negative for shortness of breath.    Cardiovascular:  Negative for chest pain.   Psychiatric/Behavioral:  Negative for suicidal ideas.            Objective       ED Triage Vitals   Temperature Pulse Blood Pressure Respirations SpO2 Patient Position - Orthostatic VS   06/25/25 0933 06/25/25 0933 06/25/25 0933 06/25/25 0933 06/25/25 0933 --   98.2 °F (36.8 °C) 90 (!) 88/50 18 92 %       Temp Source Heart Rate Source BP Location FiO2 (%) Pain Score    06/25/25 0933 06/25/25 0933 06/25/25 1441 -- --    Tympanic Monitor Right arm        Vitals      Date and Time Temp Pulse SpO2 Resp BP Pain Score FACES Pain Rating User   06/25/25 1817 98.8 °F (37.1 °C) 115 95 % 18 137/81 -- -- JK   06/25/25 1441 -- 94 96 % 20 101/62 -- -- OE   06/25/25 1029 -- 86 98 % -- 102/58 -- -- MARIO   06/25/25 0933 98.2 °F (36.8 °C) 90 92 % 18 88/50 -- -- MARICRUZ            Physical Exam  Vitals and nursing note reviewed.   Constitutional:       Comments: Intoxicated, dishevelled appearance    HENT:      Head: Atraumatic.      Mouth/Throat:      Mouth: Mucous membranes are dry.     Eyes:      Extraocular Movements: Extraocular movements intact.      Conjunctiva/sclera: Conjunctivae normal.      Pupils: Pupils are equal, round, and reactive to light.       Cardiovascular:      Rate and Rhythm: Normal rate. Rhythm irregular.      Heart sounds: Normal heart sounds.   Pulmonary:      Effort: Pulmonary effort is normal.      Breath sounds: Normal  breath sounds.   Abdominal:      General: Abdomen is flat.      Palpations: Abdomen is soft.     Musculoskeletal:         General: Normal range of motion.      Cervical back: Normal range of motion and neck supple.     Skin:     General: Skin is warm and dry.      Capillary Refill: Capillary refill takes less than 2 seconds.     Psychiatric:      Comments: Intoxicated          Results Reviewed       Procedure Component Value Units Date/Time    HS Troponin I 4hr [[id number]]  (Normal) Collected: 06/25/25 1509    Lab Status: Final result Specimen: Blood from Hand, Left Updated: 06/25/25 1535     hs TnI 4hr 28 ng/L      Delta 4hr hsTnI -3 ng/L     HS Troponin I 2hr [[id number]]  (Normal) Collected: 06/25/25 1241    Lab Status: Final result Specimen: Blood from Arm, Right Updated: 06/25/25 1308     hs TnI 2hr 33 ng/L      Delta 2hr hsTnI 2 ng/L     Ethanol [[id number]]  (Abnormal) Collected: 06/25/25 1007    Lab Status: Final result Specimen: Blood from Arm, Left Updated: 06/25/25 1121     Ethanol Lvl 429 mg/dL     CBC and differential [[id number]]  (Abnormal) Collected: 06/25/25 1007    Lab Status: Final result Specimen: Blood from Arm, Left Updated: 06/25/25 1043     WBC 4.04 Thousand/uL      RBC 3.28 Million/uL      Hemoglobin 10.1 g/dL      Hematocrit 32.3 %      MCV 99 fL      MCH 30.8 pg      MCHC 31.3 g/dL      RDW 18.1 %      MPV 10.2 fL      Platelets 99 Thousands/uL      nRBC 0 /100 WBCs      Segmented % 62 %      Immature Grans % 1 %      Lymphocytes % 23 %      Monocytes % 7 %      Eosinophils Relative 3 %      Basophils Relative 4 %      Absolute Neutrophils 2.55 Thousands/µL      Absolute Immature Grans 0.02 Thousand/uL      Absolute Lymphocytes 0.92 Thousands/µL      Absolute Monocytes 0.30 Thousand/µL      Eosinophils Absolute 0.10 Thousand/µL      Basophils Absolute 0.15 Thousands/µL     Narrative:      This is an appended report.  These results have been appended to a previously verified report.    Smear  Review(Phlebs Do Not Order) [[id number]]  (Abnormal) Collected: 06/25/25 1007    Lab Status: Final result Specimen: Blood from Arm, Left Updated: 06/25/25 1043     RBC Morphology Present     Platelet Estimate Decreased     Target Cells Present    HS Troponin 0hr (reflex protocol) [[id number]]  (Normal) Collected: 06/25/25 1007    Lab Status: Final result Specimen: Blood from Arm, Left Updated: 06/25/25 1036     hs TnI 0hr 31 ng/L     Comprehensive metabolic panel [[id number]]  (Abnormal) Collected: 06/25/25 1007    Lab Status: Final result Specimen: Blood from Arm, Left Updated: 06/25/25 1029     Sodium 143 mmol/L      Potassium 3.8 mmol/L      Chloride 105 mmol/L      CO2 22 mmol/L      ANION GAP 16 mmol/L      BUN 13 mg/dL      Creatinine 0.93 mg/dL      Glucose 87 mg/dL      Calcium 7.4 mg/dL      Corrected Calcium 8.0 mg/dL      AST 57 U/L      ALT 28 U/L      Alkaline Phosphatase 132 U/L      Total Protein 6.3 g/dL      Albumin 3.2 g/dL      Total Bilirubin 0.56 mg/dL      eGFR 87 ml/min/1.73sq m     Narrative:      [employer] guidelines for Chronic Kidney Disease (CKD):     Stage 1 with normal or high GFR (GFR > 90 mL/min/1.73 square meters)    Stage 2 Mild CKD (GFR = 60-89 mL/min/1.73 square meters)    Stage 3A Moderate CKD (GFR = 45-59 mL/min/1.73 square meters)    Stage 3B Moderate CKD (GFR = 30-44 mL/min/1.73 square meters)    Stage 4 Severe CKD (GFR = 15-29 mL/min/1.73 square meters)    Stage 5 End Stage CKD (GFR <15 mL/min/1.73 square meters)  Note: GFR calculation is accurate only with a steady state creatinine    Magnesium [[id number]]  (Abnormal) Collected: 06/25/25 1007    Lab Status: Final result Specimen: Blood from Arm, Left Updated: 06/25/25 1029     Magnesium 1.5 mg/dL     Lipase [[id number]]  (Normal) Collected: 06/25/25 1007    Lab Status: Final result Specimen: Blood from Arm, Left Updated: 06/25/25 1029     Lipase 45 u/L             No orders to display        Procedures    ED Medication and Procedure Management   Prior to Admission Medications   Prescriptions Last Dose Informant Patient Reported? Taking?   Blood Glucose Monitoring Suppl (ONE TOUCH ULTRA MINI) w/Device KIT  Self Yes No   Sig: Use as directed   Patient not taking: Reported on 6/5/2025   Blood Pressure Monitoring (Adult Blood Pressure Cuff Lg) KIT   No No   Sig: Use in the morning   Patient not taking: Reported on 6/5/2025   ONETOUCH DELICA LANCETS FINE MISC  Self Yes No   Sig: in the morning and in the evening and before bedtime. Test.   Patient not taking: Reported on 6/5/2025   Thiamine Mononitrate (VITAMIN B1) 100 mg tablet   No No   Sig: Take 1 tablet (100 mg total) by mouth in the morning.   albuterol (2.5 mg/3 mL) 0.083 % nebulizer solution   No No   Sig: Take 3 mL (2.5 mg total) by nebulization 3 (three) times a day   albuterol (PROVENTIL HFA,VENTOLIN HFA) 90 mcg/act inhaler   No No   Sig: Inhale 2 puffs every 6 (six) hours as needed for wheezing or shortness of breath   apixaban (Eliquis) 5 mg   No No   Sig: Take 1 tablet (5 mg total) by mouth in the morning and 1 tablet (5 mg total) in the evening.   aspirin 81 mg chewable tablet   No No   Sig: Chew 1 tablet (81 mg total) in the morning.   atorvastatin (LIPITOR) 40 mg tablet   No No   Sig: Take 1 tablet (40 mg total) by mouth in the morning.   diltiazem (CARDIZEM SR) 120 mg 12 hr capsule   No No   Sig: Take 1 capsule (120 mg total) by mouth in the morning and 1 capsule (120 mg total) in the evening.   ferrous sulfate 325 (65 Fe) mg tablet   No No   Sig: Take 1 tablet (325 mg total) by mouth daily with breakfast   fluticasone-vilanterol 200-25 mcg/actuation inhaler   No No   Sig: Inhale 1 puff in the morning. Rinse mouth after use.   folic acid (FOLVITE) 400 mcg tablet   No No   Sig: Take 1 tablet (400 mcg total) by mouth in the morning.   gabapentin (NEURONTIN) 300 mg capsule   No No   Sig: Take 1 capsule (300 mg total) by mouth in the  morning and 1 capsule (300 mg total) in the evening and 1 capsule (300 mg total) before bedtime.   guaiFENesin (MUCINEX) 600 mg 12 hr tablet   No No   Sig: Take 1 tablet (600 mg total) by mouth every 12 (twelve) hours for 14 days   metoprolol succinate (TOPROL-XL) 100 mg 24 hr tablet   No No   Sig: Take 1 tablet (100 mg total) by mouth daily   pantoprazole (PROTONIX) 40 mg tablet   No No   Sig: Take 1 tablet (40 mg total) by mouth in the morning.   ranolazine (RANEXA) 500 mg 12 hr tablet   No No   Sig: Take 1 tablet (500 mg total) by mouth in the morning and 1 tablet (500 mg total) in the evening.   saccharomyces boulardii (FLORASTOR) 250 mg capsule   No No   Sig: Take 1 capsule (250 mg total) by mouth in the morning and 1 capsule (250 mg total) in the evening.   tamsulosin (FLOMAX) 0.4 mg   No No   Sig: Take 1 capsule (0.4 mg total) by mouth daily with dinner   torsemide (DEMADEX) 10 mg tablet   No No   Sig: Take 1 tablet (10 mg total) by mouth every other day      Facility-Administered Medications: None     Patient's Medications   Discharge Prescriptions    No medications on file     No discharge procedures on file.  ED SEPSIS DOCUMENTATION   Time reflects when diagnosis was documented in both MDM as applicable and the Disposition within this note       Time User Action Codes Description Comment    6/25/2025  6:32 PM Quinn Alvarez Add [F10.920] Alcoholic intoxication without complication (HCC)     6/25/2025  6:32 PM Quinn Alvarez Remove [F10.920] Alcoholic intoxication without complication (HCC)     6/25/2025  6:32 PM Quinn Alvarez Add [F10.929] Alcohol intoxication (HCC)                    Quinn Alvarez PA-C  06/25/25 1753         [1]   Past Medical History:  Diagnosis Date    Acute encephalopathy 06/07/2025    Acute on chronic kidney failure  (HCC)     Alcohol abuse     Alcohol withdrawal (HCC) 10/09/2018    Atrial fibrillation (HCC)     Cancer (HCC)     prostate ca,had radiation    Cardiac  "disease     stents,then triple bypass    COPD (chronic obstructive pulmonary disease) ([facility])     Coronary artery disease     Elevated troponin 09/28/2023    ETOH abuse     Heart failure ([facility])     History of heart surgery     says triple bypass [facility]    Hx of heart artery stent     2014    Hyperlipidemia     Hypertension     Hypoglycemia 06/05/2025    Hyponatremia 10/17/2019    Hypovolemic shock ([facility]) 12/22/2019    Increased anion gap 04/21/2021    Lumbar spondylitis ([facility]) 10/13/2022    Metabolic acidosis, increased anion gap 04/21/2021    Nasal bone fracture 10/10/2022    Prostate CA ([facility])     S/P CABG x 3     2004    Sleep apnea    [2]   Past Surgical History:  Procedure Laterality Date    CARDIAC CATHETERIZATION      2 stents    CORONARY ARTERY BYPASS GRAFT      CORONARY ARTERY BYPASS GRAFT  2004    [place] ARTHRD ANT INTERBODY MIN DSC CRV BELOW C2 N/A 12/16/2020    Procedure: Anterior cervical discectomy with fusion C4-C7; Posterior cervical decompression and fusion C2-T2;  Surgeon: David Rowell MD;  Location: [facility];  Service: Neurosurgery    TONSILLECTOMY     [3]   Family History  Problem Relation Name Age of Onset    Diabetes Mother      Uterine cancer Mother      COPD Father      Hypertension Father     [4]   Social History  Tobacco Use    Smoking status: Every Day     Current packs/day: 1.50     Average packs/day: 1.5 packs/day for 40.0 years (60.0 ttl pk-yrs)     Types: Cigarettes    Smokeless tobacco: Never   Vaping Use    Vaping status: Never Used   Substance Use Topics    Alcohol use: Yes     Alcohol/week: 4.0 standard drinks of alcohol     Types: 4 Standard drinks or equivalent per week     Comment: \"quart a day\"    Drug use: No        Quinn Alvarez PA-C  06/25/25 7218    "

## 2025-06-25 NOTE — ASSESSMENT & PLAN NOTE
Chronic, rate controlled  Continue home eliquis 5mg BID, Toprol 100mg daily, and cardizem 120mg BID

## 2025-06-25 NOTE — ASSESSMENT & PLAN NOTE
Wt Readings from Last 3 Encounters:   06/22/25 80.5 kg (177 lb 7.5 oz)   06/10/25 80 kg (176 lb 5.9 oz)   05/28/25 76 kg (167 lb 8.8 oz)   Chronic, euvolemic on presentation.   - Echo (2/2025): EF 50-54%, low normal systolic, indeterminate diastolic function.   Monitor fluid status  1800cc FR  Continue home toprol 100mg daily, cardizem 120mg BID, & ranexa 500mg BID

## 2025-06-26 ENCOUNTER — TELEPHONE (OUTPATIENT)
Age: 65
End: 2025-06-26

## 2025-06-26 ENCOUNTER — HOSPITAL ENCOUNTER (EMERGENCY)
Facility: HOSPITAL | Age: 65
Discharge: HOME | End: 2025-06-26
Attending: EMERGENCY MEDICINE | Admitting: EMERGENCY MEDICINE
Payer: COMMERCIAL

## 2025-06-26 ENCOUNTER — APPOINTMENT (EMERGENCY)
Dept: RADIOLOGY | Facility: HOSPITAL | Age: 65
End: 2025-06-26
Payer: COMMERCIAL

## 2025-06-26 VITALS
OXYGEN SATURATION: 98 % | TEMPERATURE: 98 F | HEART RATE: 76 BPM | SYSTOLIC BLOOD PRESSURE: 95 MMHG | DIASTOLIC BLOOD PRESSURE: 61 MMHG | RESPIRATION RATE: 16 BRPM

## 2025-06-26 VITALS
DIASTOLIC BLOOD PRESSURE: 88 MMHG | HEART RATE: 89 BPM | RESPIRATION RATE: 18 BRPM | TEMPERATURE: 98.2 F | OXYGEN SATURATION: 98 % | SYSTOLIC BLOOD PRESSURE: 153 MMHG
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
## 2025-06-26 DIAGNOSIS — F10.929 ALCOHOL INTOXICATION (HCC): ICD-10-CM

## 2025-06-26 DIAGNOSIS — S50.819A FOREARM ABRASION: ICD-10-CM

## 2025-06-26 LAB
ATRIAL RATE: 103 BPM
QRS AXIS: 102 DEGREES
QRS AXIS: 114 DEGREES
QRSD INTERVAL: 86 MS
QRSD INTERVAL: 94 MS
QT INTERVAL: 356 MS
QT INTERVAL: 376 MS
QTC INTERVAL: 459 MS
QTC INTERVAL: 511 MS
T WAVE AXIS: -31 DEGREES
T WAVE AXIS: -63 DEGREES
VENTRICULAR RATE: 124 BPM
VENTRICULAR RATE: 90 BPM

## 2025-06-26 PROCEDURE — 99285 EMERGENCY DEPT VISIT HI MDM: CPT | Performed by: EMERGENCY MEDICINE

## 2025-06-26 PROCEDURE — 99284 EMERGENCY DEPT VISIT MOD MDM: CPT

## 2025-06-26 PROCEDURE — 70450 CT HEAD/BRAIN W/O DYE: CPT

## 2025-06-26 PROCEDURE — 99283 EMERGENCY DEPT VISIT LOW MDM: CPT | Performed by: EMERGENCY MEDICINE

## 2025-06-26 PROCEDURE — 72125 CT NECK SPINE W/O DYE: CPT

## 2025-06-26 PROCEDURE — 93010 ELECTROCARDIOGRAM REPORT: CPT | Performed by: INTERNAL MEDICINE

## 2025-06-26 RX ORDER — ATORVASTATIN CALCIUM 40 MG/1
40 TABLET, FILM COATED ORAL
Status: DISCONTINUED | OUTPATIENT
Start: 2025-06-26 | End: 2025-06-26 | Stop reason: HOSPADM

## 2025-06-26 RX ORDER — METOPROLOL TARTRATE 25 MG/1
25 TABLET, FILM COATED ORAL ONCE
Status: DISCONTINUED | OUTPATIENT
Start: 2025-06-26 | End: 2025-06-26

## 2025-06-26 RX ORDER — METOPROLOL TARTRATE 50 MG
50 TABLET ORAL ONCE
Status: COMPLETED | OUTPATIENT
Start: 2025-06-26 | End: 2025-06-26

## 2025-06-26 RX ORDER — ATORVASTATIN CALCIUM 40 MG/1
40 TABLET, FILM COATED ORAL
Status: DISCONTINUED | OUTPATIENT
Start: 2025-06-26 | End: 2025-06-26

## 2025-06-26 RX ORDER — DILTIAZEM HYDROCHLORIDE 30 MG/1
60 TABLET, FILM COATED ORAL ONCE
Status: COMPLETED | OUTPATIENT
Start: 2025-06-26 | End: 2025-06-26

## 2025-06-26 RX ADMIN — DILTIAZEM HYDROCHLORIDE 60 MG: 30 TABLET, FILM COATED ORAL at 01:07

## 2025-06-26 RX ADMIN — METOPROLOL TARTRATE 50 MG: 50 TABLET, FILM COATED ORAL at 01:07

## 2025-06-26 RX ADMIN — ATORVASTATIN CALCIUM 40 MG: 40 TABLET, FILM COATED ORAL at 01:27

## 2025-06-26 NOTE — ED CARE HANDOFF
[facility] Warm Handoff Outcome Note    Patient name Gregg Partida  Location ED HW2/ED HW2 MRN [id number]  Age: 65 y.o.          Plan Type:  Warm Handoff                                                                                    Plan Date: 6/26/2025  Service:  ED Warm Handoff      Substance Use History:  Alcohol    Warm Handoff Update:  Patient discharged to home    Warm Handoff Outcome: Non-Treatment Related Resources

## 2025-06-26 NOTE — ED PROVIDER NOTES
Final Diagnosis:  1. Fall, initial encounter    2. Forearm abrasion    3. Alcohol intoxication (HCC)        Chief Complaint   Patient presents with    Fall     Pt arrived with bls after discharge stating he drank a pint of vodka and fell states he did not hit his head       HPI  Pt was here earlier    D/c then drank vodka and fell    Arm abrasion    Denies headstrike, has no external signs of headstrike    On eloquis, but takes intermittently    No pain    Intoxicated.     EMS additionally reports:     - Previous charting underwent limited review with attention to last ED visits, labs, ekgs, and prior imaging.  Chart review reveals :     Admission on 06/25/2025, Discharged on 06/25/2025   Component Date Value Ref Range Status    WBC 06/25/2025 4.04 (L)  4.31 - 10.16 Thousand/uL Final    RBC 06/25/2025 3.28 (L)  3.88 - 5.62 Million/uL Final    Hemoglobin 06/25/2025 10.1 (L)  12.0 - 17.0 g/dL Final    Hematocrit 06/25/2025 32.3 (L)  36.5 - 49.3 % Final    MCV 06/25/2025 99 (H)  82 - 98 fL Final    MCH 06/25/2025 30.8  26.8 - 34.3 pg Final    MCHC 06/25/2025 31.3 (L)  31.4 - 37.4 g/dL Final    RDW 06/25/2025 18.1 (H)  11.6 - 15.1 % Final    MPV 06/25/2025 10.2  8.9 - 12.7 fL Final    Platelets 06/25/2025 99 (L)  149 - 390 Thousands/uL Final    Manual Review of Smear Performed    nRBC 06/25/2025 0  /100 WBCs Final    Segmented % 06/25/2025 62  43 - 75 % Final    Immature Grans % 06/25/2025 1  0 - 2 % Final    Lymphocytes % 06/25/2025 23  14 - 44 % Final    Monocytes % 06/25/2025 7  4 - 12 % Final    Eosinophils Relative 06/25/2025 3  0 - 6 % Final    Basophils Relative 06/25/2025 4 (H)  0 - 1 % Final    Absolute Neutrophils 06/25/2025 2.55  1.85 - 7.62 Thousands/µL Final    Absolute Immature Grans 06/25/2025 0.02  0.00 - 0.20 Thousand/uL Final    Absolute Lymphocytes 06/25/2025 0.92  0.60 - 4.47 Thousands/µL Final    Absolute Monocytes 06/25/2025 0.30  0.17 - 1.22 Thousand/µL Final    Eosinophils Absolute 06/25/2025  "0.10  0.00 - 0.61 Thousand/µL Final    Basophils Absolute 06/25/2025 0.15 (H)  0.00 - 0.10 Thousands/µL Final    Sodium 06/25/2025 143  135 - 147 mmol/L Final    Potassium 06/25/2025 3.8  3.5 - 5.3 mmol/L Final    Chloride 06/25/2025 105  96 - 108 mmol/L Final    CO2 06/25/2025 22  21 - 32 mmol/L Final    ANION GAP 06/25/2025 16 (H)  4 - 13 mmol/L Final    BUN 06/25/2025 13  5 - 25 mg/dL Final    Creatinine 06/25/2025 0.93  0.60 - 1.30 mg/dL Final    Standardized to IDMS reference method    Glucose 06/25/2025 87  65 - 140 mg/dL Final    If the patient is fasting, the ADA then defines impaired fasting glucose as > 100 mg/dL and diabetes as > or equal to 123 mg/dL.    Calcium 06/25/2025 7.4 (L)  8.4 - 10.2 mg/dL Final    Corrected Calcium 06/25/2025 8.0 (L)  8.3 - 10.1 mg/dL Final    AST 06/25/2025 57 (H)  13 - 39 U/L Final    ALT 06/25/2025 28  7 - 52 U/L Final    Specimen collection should occur prior to Sulfasalazine administration due to the potential for falsely depressed results.     Alkaline Phosphatase 06/25/2025 132 (H)  34 - 104 U/L Final    Total Protein 06/25/2025 6.3 (L)  6.4 - 8.4 g/dL Final    Albumin 06/25/2025 3.2 (L)  3.5 - 5.0 g/dL Final    Total Bilirubin 06/25/2025 0.56  0.20 - 1.00 mg/dL Final    Use of this assay is not recommended for patients undergoing treatment with eltrombopag due to the potential for falsely elevated results.  N-acetyl-p-benzoquinone imine (metabolite of Acetaminophen) will generate erroneously low results in samples for patients that have taken an overdose of Acetaminophen.    eGFR 06/25/2025 87  ml/min/1.73sq m Final    Ethanol Lvl 06/25/2025 429 (H)  <10 mg/dL Final    hs TnI 0hr 06/25/2025 31  \"Refer to ACS Flowchart\"- see link ng/L Final    Comment:                                              Initial (time 0) result  If >=50 ng/L, Myocardial injury suggested ;  Type of myocardial injury and treatment strategy  to be determined.  If 5-49 ng/L, a delta result at 2 " "hours will be needed to further evaluate.  If <4 ng/L, and chest pain has been >3 hours since onset, patient may qualify for discharge based on the HEART score in the ED.  If <5 ng/L and <3hours since onset of chest pain, a delta result at 2 hours will be needed to further evaluate.    HS Troponin 99th Percentile URL of a Health Population=12 ng/L with a 95% Confidence Interval of 8-18 ng/L.    Second Troponin (time 2 hours)  If calculated delta >= 20 ng/L,  Myocardial injury suggested ; Type of myocardial injury and treatment strategy to be determined.  If 5-49 ng/L and the calculated delta is 5-19 ng/L, consult medical service for evaluation.  Continue evaluation for ischemia on ecg and other possible etiology and repeat hs troponin at 4 hours.  If delta is <5 ng/L at 2                            hours, consider discharge based on risk stratification via the HEART score (if in ED), or LAZARA risk score in IP/Observation.    HS Troponin 99th Percentile URL of a Health Population=12 ng/L with a 95% Confidence Interval of 8-18 ng/L.    Magnesium 06/25/2025 1.5 (L)  1.9 - 2.7 mg/dL Final    Lipase 06/25/2025 45  11 - 82 u/L Final    hs TnI 2hr 06/25/2025 33  \"Refer to ACS Flowchart\"- see link ng/L Final    Comment:                                              Initial (time 0) result  If >=50 ng/L, Myocardial injury suggested ;  Type of myocardial injury and treatment strategy  to be determined.  If 5-49 ng/L, a delta result at 2 hours will be needed to further evaluate.  If <4 ng/L, and chest pain has been >3 hours since onset, patient may qualify for discharge based on the HEART score in the ED.  If <5 ng/L and <3hours since onset of chest pain, a delta result at 2 hours will be needed to further evaluate.    HS Troponin 99th Percentile URL of a Health Population=12 ng/L with a 95% Confidence Interval of 8-18 ng/L.    Second Troponin (time 2 hours)  If calculated delta >= 20 ng/L,  Myocardial injury suggested ; Type of " "myocardial injury and treatment strategy to be determined.  If 5-49 ng/L and the calculated delta is 5-19 ng/L, consult medical service for evaluation.  Continue evaluation for ischemia on ecg and other possible etiology and repeat hs troponin at 4 hours.  If delta is <5 ng/L at 2                            hours, consider discharge based on risk stratification via the HEART score (if in ED), or LAZARA risk score in IP/Observation.    HS Troponin 99th Percentile URL of a Health Population=12 ng/L with a 95% Confidence Interval of 8-18 ng/L.    Delta 2hr hsTnI 06/25/2025 2  <20 ng/L Final    RBC Morphology 06/25/2025 Present   Final    Platelet Estimate 06/25/2025 Decreased (A)  Adequate Final    Target Cells 06/25/2025 Present   Final    hs TnI 4hr 06/25/2025 28  \"Refer to ACS Flowchart\"- see link ng/L Final    Comment:                                              Initial (time 0) result  If >=50 ng/L, Myocardial injury suggested ;  Type of myocardial injury and treatment strategy  to be determined.  If 5-49 ng/L, a delta result at 2 hours will be needed to further evaluate.  If <4 ng/L, and chest pain has been >3 hours since onset, patient may qualify for discharge based on the HEART score in the ED.  If <5 ng/L and <3hours since onset of chest pain, a delta result at 2 hours will be needed to further evaluate.    HS Troponin 99th Percentile URL of a Health Population=12 ng/L with a 95% Confidence Interval of 8-18 ng/L.    Second Troponin (time 2 hours)  If calculated delta >= 20 ng/L,  Myocardial injury suggested ; Type of myocardial injury and treatment strategy to be determined.  If 5-49 ng/L and the calculated delta is 5-19 ng/L, consult medical service for evaluation.  Continue evaluation for ischemia on ecg and other possible etiology and repeat hs troponin at 4 hours.  If delta is <5 ng/L at 2                            hours, consider discharge based on risk stratification via the HEART score (if in ED), or " LAZARA risk score in IP/Observation.    HS Troponin 99th Percentile URL of a Health Population=12 ng/L with a 95% Confidence Interval of 8-18 ng/L.    Delta 4hr hsTnI 06/25/2025 -3  <20 ng/L Final       - No language barrier.   - History obtained from patient    - Discuss patient's care, with patient permission or by chart review, with      PMH:   has a past medical history of Acute encephalopathy (06/07/2025), Acute on chronic kidney failure  (HCC), Alcohol abuse, Alcohol withdrawal (HCC) (10/09/2018), Atrial fibrillation (HCC), Cancer (HCC), Cardiac disease, COPD (chronic obstructive pulmonary disease) (HCC), Coronary artery disease, Elevated troponin (09/28/2023), ETOH abuse, Heart failure (HCC), History of heart surgery, heart artery stent, Hyperlipidemia, Hypertension, Hypoglycemia (06/05/2025), Hyponatremia (10/17/2019), Hypovolemic shock (HCC) (12/22/2019), Increased anion gap (04/21/2021), Lumbar spondylitis (HCC) (10/13/2022), Metabolic acidosis, increased anion gap (04/21/2021), Nasal bone fracture (10/10/2022), Prostate CA (HCC), S/P CABG x 3, and Sleep apnea.    PSH:   has a past surgical history that includes Coronary artery bypass graft; Tonsillectomy; Coronary artery bypass graft (2004); pr arthrd ant interbody min dsc crv below c2 (N/A, 12/16/2020); and Cardiac catheterization.     Social History:  Tobacco Use: High Risk (6/26/2025)    Patient History     Smoking Tobacco Use: Every Day     Smokeless Tobacco Use: Never     Passive Exposure: Not on file     Alcohol Use: Alcohol Misuse (10/17/2022)    AUDIT-C     Frequency of Alcohol Consumption: 4 or more times a week     Average Number of Drinks: 10 or more     Frequency of Binge Drinking: Daily or almost daily     No illicit use       ROS:  Pertinent positives/negatives: .     Some ROS may be present in the HPI and would take precedent over these standard questions asked below.   Review of Systems   Skin:  Positive for wound.        CONSTITUTIONAL:  No  lethargy. No unexpected weight loss. No change in behavior.  EYES:  No pain, redness, or discharge. No loss of vision. No orbital trauma or pain.   ENT:  No tinnitus or decreased hearing. No epistaxis/purulent rhinorrhea. No voice change, airway closing, trismus.   CARDIOVASCULAR:  No chest pain. No skin mottling or pallor. No change in exertional capacity  RESPIRATORY:  No hemoptysis. No paroxysmal nocturnal dyspnea. No stridor. No apnea or bluing.   GASTROINTESTINAL:  No vomiting, diarrhea. No distension. No melena. No hematochezia.   GENITOURINARY:  No nocturia. No hematuria or foul smelling or cloudy urine. No discharge. No sores/adenopathy.   MUSCULOSKELETAL:  No contracture.  No new deformity.   INTEGUMENTARY:  No swelling. No unexpected contusions. No abrasions. No lymphangitis.  NEUROLOGIC:  No meningismus. No new numbness of the extremities. No new focal weakness. No postural instability  PSYCHIATRIC:  No SI HI AVH  HEMATOLOGICAL:  No bleeding. No petechiae. No bruising.  ALLERGIES:  No urticaria. No sudden abd cramping. No stridor.    PE:     Physical exam highlights:   Physical Exam       Vitals:    06/26/25 0025   BP: 106/66   BP Location: Left arm   Pulse: 99   Resp: 16   Temp: 98.2 °F (36.8 °C)   TempSrc: Oral   SpO2: 92%     Vitals reviewed by me.   Nursing note reviewed  Chaperone present for all sensitive exam.  Const: No acute distress. Alert. Nontoxic. Not diaphoretic.    HEENT: External ears normal. No protrusion drainage swelling. Nose normal. No drainage/traumatic deformity. MM. Mouth with baseline/symmetric movement. No trismus.   Eyes: No squinting. No icterus. No tearing/swelling/drainage. Tracks through the room with normal EOM.   Neck: ROM normal. No rigidity. No meningismus.  Cards: Rate as per vitals Compared to monitor sinus unless documented. Regular Well perfused.  Pulm: Effort and excursion normal. No distress. No audible wheezing/no stridor. Normal resp rate without retraction or  change in work of breathing.  Abd: No distension beyond baseline. No fluctuant wave. Patient without peritoneal pain with shifting/bumping the bed.   MSK: ROM normal baseline. No deformity. No contractures from baseline.   Skin: No new rashes visible. Well perfused. Wound on right forearm, skin tear  Neuro: Nonfocal. Baseline. CN grossly intact. Moving all four with coordination.   Psych: Normal behavior and affect.        A:  - Nursing note reviewed.    Ddx and MDM  Considered diagnoses    Lay down skin tear w/ vaseline gauze  Cannot suture      CT head c spine r/o ICH, skull injury, c spine injury  Cannot clear clinically age and intox    Montior for sobriety    Administer some of necessary rate control meds        Dispo decision       My conversation with consultant reveals:        Decision rules:                           My read of the XR/CT scan reveals:     CT head without contrast    (Results Pending)   CT spine cervical without contrast    (Results Pending)       Orders Placed This Encounter   Procedures    CT head without contrast    CT spine cervical without contrast     Labs Reviewed - No data to display    *Each of these labs was reviewed. Particular standout labs will be noted in the ED Course above     Final Diagnosis:  1. Fall, initial encounter    2. Forearm abrasion    3. Alcohol intoxication (HCC)          P:  - hospital tx includes   Medications   atorvastatin (LIPITOR) tablet 40 mg (40 mg Oral Given 6/26/25 0127)   metoprolol tartrate (LOPRESSOR) tablet 50 mg (50 mg Oral Given 6/26/25 0107)   diltiazem (CARDIZEM) tablet 60 mg (60 mg Oral Given 6/26/25 0107)         - disposition  Time reflects when diagnosis was documented in both MDM as applicable and the Disposition within this note       Time User Action Codes Description Comment    6/26/2025  2:53 AM Fito Liang [W19.XXXA] Fall, initial encounter     6/26/2025  2:53 AM Fito Liang [S50.819A] Forearm abrasion     6/26/2025   2:53 AM Fito Liang [F10.929] Alcohol intoxication (HCC)           ED Disposition       ED Disposition   Discharge    Condition   Stable    Date/Time   Thu Jun 26, 2025  2:53 AM    Comment   Gregg Partida discharge to home/self care.                   Follow-up Information    None         - patient will call their PCP to let them know they were in the emergency department. We discuss return precautions and patient is agreeable with plan and aformentioned disposition.       - additional treatment intended, if consistent with primary provider:  - patient to follow with :      Patient's Medications   Discharge Prescriptions    No medications on file     No discharge procedures on file.  Prior to Admission Medications   Prescriptions Last Dose Informant Patient Reported? Taking?   Blood Glucose Monitoring Suppl (ONE TOUCH ULTRA MINI) w/Device KIT  Self Yes No   Sig: Use as directed   Patient not taking: Reported on 6/5/2025   Blood Pressure Monitoring (Adult Blood Pressure Cuff Lg) KIT   No No   Sig: Use in the morning   Patient not taking: Reported on 6/5/2025   ONETOUCH DELICA LANCETS FINE MISC  Self Yes No   Sig: in the morning and in the evening and before bedtime. Test.   Patient not taking: Reported on 6/5/2025   Thiamine Mononitrate (VITAMIN B1) 100 mg tablet   No No   Sig: Take 1 tablet (100 mg total) by mouth in the morning.   albuterol (2.5 mg/3 mL) 0.083 % nebulizer solution   No No   Sig: Take 3 mL (2.5 mg total) by nebulization 3 (three) times a day   albuterol (PROVENTIL HFA,VENTOLIN HFA) 90 mcg/act inhaler   No No   Sig: Inhale 2 puffs every 6 (six) hours as needed for wheezing or shortness of breath   apixaban (Eliquis) 5 mg   No No   Sig: Take 1 tablet (5 mg total) by mouth in the morning and 1 tablet (5 mg total) in the evening.   aspirin 81 mg chewable tablet   No No   Sig: Chew 1 tablet (81 mg total) in the morning.   atorvastatin (LIPITOR) 40 mg tablet   No No   Sig: Take 1 tablet (40 mg  "total) by mouth in the morning.   diltiazem (CARDIZEM SR) 120 mg 12 hr capsule   No No   Sig: Take 1 capsule (120 mg total) by mouth in the morning and 1 capsule (120 mg total) in the evening.   ferrous sulfate 325 (65 Fe) mg tablet   No No   Sig: Take 1 tablet (325 mg total) by mouth daily with breakfast   fluticasone-vilanterol 200-25 mcg/actuation inhaler   No No   Sig: Inhale 1 puff in the morning. Rinse mouth after use.   folic acid (FOLVITE) 400 mcg tablet   No No   Sig: Take 1 tablet (400 mcg total) by mouth in the morning.   gabapentin (NEURONTIN) 300 mg capsule   No No   Sig: Take 1 capsule (300 mg total) by mouth in the morning and 1 capsule (300 mg total) in the evening and 1 capsule (300 mg total) before bedtime.   guaiFENesin (MUCINEX) 600 mg 12 hr tablet   No No   Sig: Take 1 tablet (600 mg total) by mouth every 12 (twelve) hours for 14 days   metoprolol succinate (TOPROL-XL) 100 mg 24 hr tablet   No No   Sig: Take 1 tablet (100 mg total) by mouth daily   pantoprazole (PROTONIX) 40 mg tablet   No No   Sig: Take 1 tablet (40 mg total) by mouth in the morning.   ranolazine (RANEXA) 500 mg 12 hr tablet   No No   Sig: Take 1 tablet (500 mg total) by mouth in the morning and 1 tablet (500 mg total) in the evening.   saccharomyces boulardii (FLORASTOR) 250 mg capsule   No No   Sig: Take 1 capsule (250 mg total) by mouth in the morning and 1 capsule (250 mg total) in the evening.   tamsulosin (FLOMAX) 0.4 mg   No No   Sig: Take 1 capsule (0.4 mg total) by mouth daily with dinner   torsemide (DEMADEX) 10 mg tablet   No No   Sig: Take 1 tablet (10 mg total) by mouth every other day      Facility-Administered Medications: None       Portions of the record may have been created with voice recognition software. Occasional wrong word or \"sound a like\" substitutions may have occurred due to the inherent limitations of voice recognition software. Read the chart carefully and recognize, using context, where " substitutions have occurred.    Electronically signed by:  MD Fito Franco MD  06/26/25 0251

## 2025-06-26 NOTE — TELEPHONE ENCOUNTER
Patient in the emergency room for multiple visits. Patient reported that he no longer wishes to see the residents at [facility] as his PCP.

## 2025-06-26 NOTE — DISCHARGE INSTRUCTIONS
Patient Education     Preventing falls in adults   The Basics   Written by the doctors and editors at [facility]   Am I at risk of falling? -- Falls can happen to anyone, no matter how old they are. Several things can increase your risk of a fall, including:   Vision problems   Having certain chronic health conditions, being sick, having recently been in the hospital, or having had surgery   Changing the medicines you take   An unsafe or unfamiliar setting (for example, a room with rugs or furniture that might trip you, or an area you don't know well)  Your risk of falling increases as you grow older. That's because getting older can make it harder to walk steadily and keep your balance. Also, the effects of falls are more serious for older people.  Overall, 3 to 4 out of every 10 people over the age of 65 fall each year. Many people who fracture a hip never fully recover to how they were before the fracture. If you have fallen in the past, you are at higher risk of falling again.  How can my doctor help me avoid falling? -- Your doctor can talk to you about the following things:   Past falls - It is important to tell your doctor about any times that you have fallen or almost fallen. They can then suggest ways to prevent another fall.   Your health conditions - Some health problems can put you at risk of falling. These include conditions that affect eyesight, hearing, muscle strength, or balance.   What to do if you are in the hospital - Falls can happen in the hospital because you are in an unfamiliar place. You might feel unsteady or drowsy from medicines or anesthesia. Or you might be attached to catheters or IV tubing that you could trip over. You are also likely to be weaker than usual.   Your medicines - Certain medicines can increase the risk of falling. These include some medicines for sleeping problems, anxiety, high blood pressure, or depression. Adding new medicines, or changing doses of some medicines, can  also affect your risk of falling.  The more your doctor knows about your situation, the better they can help you. For example, if you fell because you have a condition that causes pain, your doctor might suggest treatments to help with the pain. Or if any of your medicines are making you dizzy and more likely to fall, your doctor might switch you to a different medicine.  Is there anything I can do on my own? -- Yes. To help keep from falling, you can:   Make your home safer - To avoid falling at home, get rid of things that might make you trip or slip. This can include furniture, electrical cords, clutter, and loose rugs (figure 1). Keep your home well lit so you can easily see where you are going. Avoid storing things in high places so you don't have to reach or climb.   Wear non-slip socks or sturdy shoes that fit well - Wearing shoes with high heels or slippery soles, or shoes that are too loose, can lead to falls. Walking around in bare feet, or only socks, can also increase your risk of falling.   Take vitamin D pills - Taking vitamin D might lower the risk of falls in older people. This is because vitamin D helps make bones and muscles stronger. Your doctor can talk to you about whether you should take extra vitamin D, and how much.   Stay active - Moving your body regularly can help lower your risk of falling. It might also help prevent you from getting hurt if you do fall. It is best to do a few different activities that help with both strength and balance. There are many kinds of exercise that can be safe for older people. These include walking, swimming, and dilcia chi (a Chinese martial art involving slow, gentle movements).   Use a cane, walker, or other safety devices - If your doctor recommends that you use a cane or walker, make sure that it's the right size and you know how to use it. There are other devices that might help you avoid falling, too. These include grab bars or a sturdy seat for the  shower, non-slip bath mats, and hand rails or treads for the stairs (to prevent slipping).   Take extra care if you have had surgery or are in the hospital - Ask for help with getting out of bed, getting up from a chair, or going to the bathroom. Your body needs time to heal, and it's normal to need help with these things while you recover. It can also help to keep your belongings within reach, and avoid walking around in the dark. If you need help, use the call button instead of trying to get up.  If you worry that you could fall, you can get an emergency alert system. This is usually an alarm button that lets you call for help if you fall and can't get up. If you live alone and you do not have an emergency alert system, always carry a cell phone or portable phone with you when moving around the house.  How do I get up from a fall? -- If you do fall, try not to be afraid. Stay calm, and take slow, deep breaths. If someone is near you, call for help right away. If you have an emergency alert system, use it.  Try to find out if you are hurt. If you are hurt badly, trying to get up can make things worse. If you do not think that you are hurt badly, come up with a plan to get up off of the floor.  Some tips to get up after a fall if you are alone:   Look around you for a piece of sturdy furniture such as a couch or chair. Try to move your body to the furniture. You might need to scoot, crawl, or roll to get close. Do these movements very slowly. If you feel any sharp pains, you might need to stop.   Once you are close to the furniture, roll onto your side. Pull your knees up toward your chest, and try to get on your hands and knees.   Put your hands on the seat of the couch or chair.   Bring 1 leg forward so the foot is flat on the floor. If you have a stronger leg, move this leg first.   Now, try to stand up. Once both feet are on the ground, slowly turn and lower yourself to sit down on the seat.  What should I do  after a fall? -- If you had a fall, see your doctor right away, even if you aren't hurt. Your doctor can try to figure out what caused you to fall, and how likely you are to fall again. They will do an exam and talk to you about your health problems, medicines, and activities. They can also check how well you walk, move, and balance. Then, they can suggest things you can do to lower your risk of falling again.  Many older people have a hard time recovering after a fall. Doing things to prevent falling can help you protect your health and independence.  All topics are updated as new evidence becomes available and our peer review process is complete.  This topic retrieved from [employer] on: Apr 04, 2024.  Topic 30064 Version 25.0  Release: 32.2.4 - C32.93  © 2024 UpToDate, Inc. and/or its affiliates. All rights reserved.  figure 1: How to avoid falling at home     This picture shows some of the things that can cause a fall in your home. Look around and remove any loose rugs, electrical cords, clutter, or furniture that could trip you.  Graphic 26930 Version 1.0  Consumer Information Use and Disclaimer   Disclaimer: This generalized information is a limited summary of diagnosis, treatment, and/or medication information. It is not meant to be comprehensive and should be used as a tool to help the user understand and/or assess potential diagnostic and treatment options. It does NOT include all information about conditions, treatments, medications, side effects, or risks that may apply to a specific patient. It is not intended to be medical advice or a substitute for the medical advice, diagnosis, or treatment of a health care provider based on the health care provider's examination and assessment of a patient's specific and unique circumstances. Patients must speak with a health care provider for complete information about their health, medical questions, and treatment options, including any risks or benefits regarding  use of medications. This information does not endorse any treatments or medications as safe, effective, or approved for treating a specific patient. UpToDate, Inc. and its affiliates disclaim any warranty or liability relating to this information or the use thereof.The use of this information is governed by the Terms of Use, available at https://www.[employer].com/en/know/clinical-effectiveness-terms. 2024© UpToDate, Inc. and its affiliates and/or licensors. All rights reserved.  Copyright   © 2024 UpToDate, Inc. and/or its affiliates. All rights reserved.

## 2025-06-26 NOTE — ED NOTES
Pt recently d.c from ED. He arrives by squad, pt reported that he called 911 since he wanted to be brought here so he can get sandwich. Pt appears in no distress, resp is non labored.      hRea Eason RN  06/26/25 [id number]

## 2025-06-27 ENCOUNTER — HOSPITAL ENCOUNTER (EMERGENCY)
Facility: HOSPITAL | Age: 65
Discharge: HOME | DRG: 189 | End: 2025-06-28
Attending: EMERGENCY MEDICINE
Payer: COMMERCIAL

## 2025-06-27 DIAGNOSIS — F10.10 ALCOHOL ABUSE: Primary | ICD-10-CM

## 2025-06-27 PROCEDURE — 99284 EMERGENCY DEPT VISIT MOD MDM: CPT

## 2025-06-27 PROCEDURE — 99284 EMERGENCY DEPT VISIT MOD MDM: CPT | Performed by: EMERGENCY MEDICINE

## 2025-06-27 RX ORDER — OLANZAPINE 5 MG/1
10 TABLET, ORALLY DISINTEGRATING ORAL ONCE
Status: COMPLETED | OUTPATIENT
Start: 2025-06-27 | End: 2025-06-27

## 2025-06-27 RX ORDER — LORAZEPAM 1 MG/1
1 TABLET ORAL ONCE
Status: COMPLETED | OUTPATIENT
Start: 2025-06-27 | End: 2025-06-27

## 2025-06-27 RX ADMIN — LORAZEPAM 1 MG: 1 TABLET ORAL at 18:04

## 2025-06-27 RX ADMIN — OLANZAPINE 10 MG: 5 TABLET, ORALLY DISINTEGRATING ORAL at 18:04

## 2025-06-27 NOTE — ED PROVIDER NOTES
"Time reflects when diagnosis was documented in both MDM as applicable and the Disposition within this note       Time User Action Codes Description Comment    6/27/2025 10:50 PM Tyrone Cervantes Add [F10.10] Alcohol abuse           ED Disposition       ED Disposition   Discharge    Condition   Stable    Date/Time   Fri Jun 27, 2025 10:50 PM    Comment   Gregg Partida discharge to home/self care.                   Assessment & Plan       Medical Decision Making  Alcohol abuse and the patient not seeking detox or rehab services.    Risk  Prescription drug management.             Medications   OLANZapine (ZyPREXA ZYDIS) dispersible tablet 10 mg (10 mg Oral Given 6/27/25 1804)   LORazepam (ATIVAN) tablet 1 mg (1 mg Oral Given 6/27/25 1804)       ED Risk Strat Scores                    No data recorded                            History of Present Illness       Chief Complaint   Patient presents with    Fall     Pt arrives via squad with report per squad that pt fell at appt and was bleeding on furniture at appt.  Pt noted with no active bleeding to right elbow.  Pt reports  reason for coming in, \"I come in everyday when I drink.\"  Pt demanding a sandwich at time of triage repeatedly and loudly.         Past Medical History[1]   Past Surgical History[2]   Family History[3]   Social History[4]   E-Cigarette/Vaping    E-Cigarette Use Never User       E-Cigarette/Vaping Substances    Nicotine Yes     THC No     CBD No     Flavoring No     Other No     Unknown No       I have reviewed and agree with the history as documented.     Patient is an alcoholic.  He has been seen several times this week arriving to the ED in an intoxicated state, requesting a sandwich.  Patient was here twice yesterday.  Returns again today because he wants a sandwich so he called the ambulance.  Patient has been drinking this morning and today.  Does not wish detox or rehab services.  Denies any trauma today        Review of Systems "   Constitutional:  Negative for fever.   HENT:  Negative for congestion.    Eyes:  Negative for visual disturbance.   Respiratory:  Negative for cough and shortness of breath.    Cardiovascular:  Negative for chest pain.   Gastrointestinal:  Negative for abdominal pain.   Musculoskeletal:  Negative for back pain.   Skin:  Positive for wound.        Recent right forearm abrasion   Neurological:  Negative for seizures and headaches.   Hematological:  Bruises/bleeds easily.   Psychiatric/Behavioral:  Positive for behavioral problems.    All other systems reviewed and are negative.          Objective       ED Triage Vitals   Temperature Pulse Blood Pressure Respirations SpO2 Patient Position - Orthostatic VS   06/27/25 1547 06/27/25 1547 06/27/25 1547 06/27/25 1547 06/27/25 1547 06/27/25 1547   (!) 97.3 °F (36.3 °C) 99 117/80 18 97 % Lying      Temp Source Heart Rate Source BP Location FiO2 (%) Pain Score    06/27/25 1547 06/27/25 1547 06/27/25 1547 -- 06/27/25 1555    Tympanic Monitor Left arm  No Pain      Vitals      Date and Time Temp Pulse SpO2 Resp BP Pain Score FACES Pain Rating User   06/27/25 1555 -- -- -- -- -- No Pain -- WORTHY   06/27/25 1547 97.3 °F (36.3 °C) 99 97 % 18 117/80 -- -- BALJEET            Physical Exam  Vitals and nursing note reviewed.   Constitutional:       Appearance: Normal appearance.      Comments: Patient is clinically intoxicated and smells of an alcohol like substance   HENT:      Head: Normocephalic.      Right Ear: External ear normal.      Left Ear: External ear normal.      Nose: Nose normal.      Mouth/Throat:      Mouth: Mucous membranes are moist.     Eyes:      Conjunctiva/sclera: Conjunctivae normal.       Cardiovascular:      Rate and Rhythm: Normal rate.      Pulses: Normal pulses.   Pulmonary:      Effort: Pulmonary effort is normal.   Abdominal:      Palpations: Abdomen is soft.     Musculoskeletal:         General: Normal range of motion.      Cervical back: Normal range of  motion.     Skin:     General: Skin is warm.      Capillary Refill: Capillary refill takes less than 2 seconds.     Neurological:      General: No focal deficit present.      Mental Status: He is alert.     Psychiatric:      Comments: Intoxicated         Results Reviewed       None            No orders to display       Procedures    ED Medication and Procedure Management   Prior to Admission Medications   Prescriptions Last Dose Informant Patient Reported? Taking?   Blood Glucose Monitoring Suppl (ONE TOUCH ULTRA MINI) w/Device KIT  Self Yes No   Sig: Use as directed   Patient not taking: Reported on 6/5/2025   Blood Pressure Monitoring (Adult Blood Pressure Cuff Lg) KIT   No No   Sig: Use in the morning   Patient not taking: Reported on 6/5/2025   ONETOUCH DELICA LANCETS FINE MISC  Self Yes No   Sig: in the morning and in the evening and before bedtime. Test.   Patient not taking: Reported on 6/5/2025   Thiamine Mononitrate (VITAMIN B1) 100 mg tablet   No No   Sig: Take 1 tablet (100 mg total) by mouth in the morning.   albuterol (2.5 mg/3 mL) 0.083 % nebulizer solution   No No   Sig: Take 3 mL (2.5 mg total) by nebulization 3 (three) times a day   albuterol (PROVENTIL HFA,VENTOLIN HFA) 90 mcg/act inhaler   No No   Sig: Inhale 2 puffs every 6 (six) hours as needed for wheezing or shortness of breath   apixaban (Eliquis) 5 mg   No No   Sig: Take 1 tablet (5 mg total) by mouth in the morning and 1 tablet (5 mg total) in the evening.   aspirin 81 mg chewable tablet   No No   Sig: Chew 1 tablet (81 mg total) in the morning.   atorvastatin (LIPITOR) 40 mg tablet   No No   Sig: Take 1 tablet (40 mg total) by mouth in the morning.   diltiazem (CARDIZEM SR) 120 mg 12 hr capsule   No No   Sig: Take 1 capsule (120 mg total) by mouth in the morning and 1 capsule (120 mg total) in the evening.   ferrous sulfate 325 (65 Fe) mg tablet   No No   Sig: Take 1 tablet (325 mg total) by mouth daily with breakfast    fluticasone-vilanterol 200-25 mcg/actuation inhaler   No No   Sig: Inhale 1 puff in the morning. Rinse mouth after use.   folic acid (FOLVITE) 400 mcg tablet   No No   Sig: Take 1 tablet (400 mcg total) by mouth in the morning.   gabapentin (NEURONTIN) 300 mg capsule   No No   Sig: Take 1 capsule (300 mg total) by mouth in the morning and 1 capsule (300 mg total) in the evening and 1 capsule (300 mg total) before bedtime.   guaiFENesin (MUCINEX) 600 mg 12 hr tablet   No No   Sig: Take 1 tablet (600 mg total) by mouth every 12 (twelve) hours for 14 days   metoprolol succinate (TOPROL-XL) 100 mg 24 hr tablet   No No   Sig: Take 1 tablet (100 mg total) by mouth daily   pantoprazole (PROTONIX) 40 mg tablet   No No   Sig: Take 1 tablet (40 mg total) by mouth in the morning.   ranolazine (RANEXA) 500 mg 12 hr tablet   No No   Sig: Take 1 tablet (500 mg total) by mouth in the morning and 1 tablet (500 mg total) in the evening.   saccharomyces boulardii (FLORASTOR) 250 mg capsule   No No   Sig: Take 1 capsule (250 mg total) by mouth in the morning and 1 capsule (250 mg total) in the evening.   tamsulosin (FLOMAX) 0.4 mg   No No   Sig: Take 1 capsule (0.4 mg total) by mouth daily with dinner   torsemide (DEMADEX) 10 mg tablet   No No   Sig: Take 1 tablet (10 mg total) by mouth every other day      Facility-Administered Medications: None     Patient's Medications   Discharge Prescriptions    No medications on file     No discharge procedures on file.  ED SEPSIS DOCUMENTATION   Time reflects when diagnosis was documented in both MDM as applicable and the Disposition within this note       Time User Action Codes Description Comment    6/27/2025 10:50 PM Tyrone Cervantes Add [F10.10] Alcohol abuse                      [1]   Past Medical History:  Diagnosis Date    Acute encephalopathy 06/07/2025    Acute on chronic kidney failure  (HCC)     Alcohol abuse     Alcohol withdrawal (HCC) 10/09/2018    Atrial fibrillation (HCC)      "Cancer (HCC)     prostate ca,had radiation    Cardiac disease     stents,then triple bypass    COPD (chronic obstructive pulmonary disease) ([facility])     Coronary artery disease     Elevated troponin 09/28/2023    ETOH abuse     Heart failure ([facility])     History of heart surgery     says triple bypass [facility]    Hx of heart artery stent     2014    Hyperlipidemia     Hypertension     Hypoglycemia 06/05/2025    Hyponatremia 10/17/2019    Hypovolemic shock ([facility]) 12/22/2019    Increased anion gap 04/21/2021    Lumbar spondylitis ([facility]) 10/13/2022    Metabolic acidosis, increased anion gap 04/21/2021    Nasal bone fracture 10/10/2022    Prostate CA ([facility])     S/P CABG x 3     2004    Sleep apnea    [2]   Past Surgical History:  Procedure Laterality Date    CARDIAC CATHETERIZATION      2 stents    CORONARY ARTERY BYPASS GRAFT      CORONARY ARTERY BYPASS GRAFT  2004    PA ARTHRD ANT INTERBODY MIN DSC CRV BELOW C2 N/A 12/16/2020    Procedure: Anterior cervical discectomy with fusion C4-C7; Posterior cervical decompression and fusion C2-T2;  Surgeon: David Rowell MD;  Location: [facility];  Service: Neurosurgery    TONSILLECTOMY     [3]   Family History  Problem Relation Name Age of Onset    Diabetes Mother      Uterine cancer Mother      COPD Father      Hypertension Father     [4]   Social History  Tobacco Use    Smoking status: Every Day     Current packs/day: 1.50     Average packs/day: 1.5 packs/day for 40.0 years (60.0 ttl pk-yrs)     Types: Cigarettes    Smokeless tobacco: Never   Vaping Use    Vaping status: Never Used   Substance Use Topics    Alcohol use: Yes     Alcohol/week: 4.0 standard drinks of alcohol     Types: 4 Standard drinks or equivalent per week     Comment: \"quart a day\"    Drug use: No        Tyrone Cervantes MD  06/27/25 0782    "

## 2025-06-27 NOTE — ED PROVIDER NOTES
Time reflects when diagnosis was documented in both MDM as applicable and the Disposition within this note       Time User Action Codes Description Comment    6/26/2025  5:20 PM Trish Madden Add [W19.XXXA] Fall, initial encounter           ED Disposition       ED Disposition   Discharge    Condition   Stable    Date/Time   Thu Jun 26, 2025  5:20 PM    Comment   Gregg Salcedotoi discharge to home/self care.                   Assessment & Plan       Medical Decision Making  Patient discharged with appropriate instructions, and follow up. Patient verbalized understanding of instructions, had no further questions at the time of discharge. Patient had stable vital signs, and well appearing at discharge.    Problems Addressed:  Fall, initial encounter: acute illness or injury    Amount and/or Complexity of Data Reviewed  External Data Reviewed: notes.             Medications - No data to display    ED Risk Strat Scores                    No data recorded                            History of Present Illness       Chief Complaint   Patient presents with    Medical Problem       Past Medical History[1]   Past Surgical History[2]   Family History[3]   Social History[4]   E-Cigarette/Vaping    E-Cigarette Use Never User       E-Cigarette/Vaping Substances    Nicotine Yes     THC No     CBD No     Flavoring No     Other No     Unknown No       I have reviewed and agree with the history as documented.     65-year-old male who was just seen in the ED and discharged this morning presents after another fall.  Per BLS he was found on the floor he denies any head injury loss of consciousness neck pain back pain no other injuries or complaints he is asking for a sandwich.      History provided by:  Patient   used: No    Medical Problem      Review of Systems   Constitutional: Negative.    HENT: Negative.     Eyes: Negative.    Respiratory: Negative.     Cardiovascular: Negative.    Gastrointestinal: Negative.     Endocrine: Negative.    Genitourinary: Negative.    Musculoskeletal: Negative.    Skin: Negative.    Allergic/Immunologic: Negative.    Neurological: Negative.    Hematological: Negative.    Psychiatric/Behavioral: Negative.     All other systems reviewed and are negative.          Objective       ED Triage Vitals [06/26/25 1540]   Temperature Pulse Blood Pressure Respirations SpO2 Patient Position - Orthostatic VS   98 °F (36.7 °C) 76 95/61 16 98 % --      Temp Source Heart Rate Source BP Location FiO2 (%) Pain Score    Temporal Monitor -- -- --      Vitals      Date and Time Temp Pulse SpO2 Resp BP Pain Score FACES Pain Rating User   06/26/25 1540 98 °F (36.7 °C) 76 98 % 16 95/61 -- -- MARIO            Physical Exam  Vitals and nursing note reviewed.   Constitutional:       Appearance: Normal appearance.   HENT:      Head: Normocephalic and atraumatic.      Nose: Nose normal.      Mouth/Throat:      Mouth: Mucous membranes are moist.     Eyes:      Extraocular Movements: Extraocular movements intact.      Pupils: Pupils are equal, round, and reactive to light.       Cardiovascular:      Rate and Rhythm: Normal rate and regular rhythm.   Pulmonary:      Effort: Pulmonary effort is normal.      Breath sounds: Normal breath sounds.   Abdominal:      General: Abdomen is flat. Bowel sounds are normal.      Palpations: Abdomen is soft.     Musculoskeletal:         General: Normal range of motion.      Cervical back: Normal range of motion and neck supple.     Skin:     General: Skin is warm.      Capillary Refill: Capillary refill takes less than 2 seconds.     Neurological:      General: No focal deficit present.      Mental Status: He is alert and oriented to person, place, and time. Mental status is at baseline.     Psychiatric:         Mood and Affect: Mood normal.         Thought Content: Thought content normal.         Results Reviewed       None            No orders to display       Procedures    ED Medication  and Procedure Management   Prior to Admission Medications   Prescriptions Last Dose Informant Patient Reported? Taking?   Blood Glucose Monitoring Suppl (ONE TOUCH ULTRA MINI) w/Device KIT  Self Yes No   Sig: Use as directed   Patient not taking: Reported on 6/5/2025   Blood Pressure Monitoring (Adult Blood Pressure Cuff Lg) KIT   No No   Sig: Use in the morning   Patient not taking: Reported on 6/5/2025   ONETOUCH DELICA LANCETS FINE MISC  Self Yes No   Sig: in the morning and in the evening and before bedtime. Test.   Patient not taking: Reported on 6/5/2025   Thiamine Mononitrate (VITAMIN B1) 100 mg tablet   No No   Sig: Take 1 tablet (100 mg total) by mouth in the morning.   albuterol (2.5 mg/3 mL) 0.083 % nebulizer solution   No No   Sig: Take 3 mL (2.5 mg total) by nebulization 3 (three) times a day   albuterol (PROVENTIL HFA,VENTOLIN HFA) 90 mcg/act inhaler   No No   Sig: Inhale 2 puffs every 6 (six) hours as needed for wheezing or shortness of breath   apixaban (Eliquis) 5 mg   No No   Sig: Take 1 tablet (5 mg total) by mouth in the morning and 1 tablet (5 mg total) in the evening.   aspirin 81 mg chewable tablet   No No   Sig: Chew 1 tablet (81 mg total) in the morning.   atorvastatin (LIPITOR) 40 mg tablet   No No   Sig: Take 1 tablet (40 mg total) by mouth in the morning.   diltiazem (CARDIZEM SR) 120 mg 12 hr capsule   No No   Sig: Take 1 capsule (120 mg total) by mouth in the morning and 1 capsule (120 mg total) in the evening.   ferrous sulfate 325 (65 Fe) mg tablet   No No   Sig: Take 1 tablet (325 mg total) by mouth daily with breakfast   fluticasone-vilanterol 200-25 mcg/actuation inhaler   No No   Sig: Inhale 1 puff in the morning. Rinse mouth after use.   folic acid (FOLVITE) 400 mcg tablet   No No   Sig: Take 1 tablet (400 mcg total) by mouth in the morning.   gabapentin (NEURONTIN) 300 mg capsule   No No   Sig: Take 1 capsule (300 mg total) by mouth in the morning and 1 capsule (300 mg total) in  the evening and 1 capsule (300 mg total) before bedtime.   guaiFENesin (MUCINEX) 600 mg 12 hr tablet   No No   Sig: Take 1 tablet (600 mg total) by mouth every 12 (twelve) hours for 14 days   metoprolol succinate (TOPROL-XL) 100 mg 24 hr tablet   No No   Sig: Take 1 tablet (100 mg total) by mouth daily   pantoprazole (PROTONIX) 40 mg tablet   No No   Sig: Take 1 tablet (40 mg total) by mouth in the morning.   ranolazine (RANEXA) 500 mg 12 hr tablet   No No   Sig: Take 1 tablet (500 mg total) by mouth in the morning and 1 tablet (500 mg total) in the evening.   saccharomyces boulardii (FLORASTOR) 250 mg capsule   No No   Sig: Take 1 capsule (250 mg total) by mouth in the morning and 1 capsule (250 mg total) in the evening.   tamsulosin (FLOMAX) 0.4 mg   No No   Sig: Take 1 capsule (0.4 mg total) by mouth daily with dinner   torsemide (DEMADEX) 10 mg tablet   No No   Sig: Take 1 tablet (10 mg total) by mouth every other day      Facility-Administered Medications: None     Discharge Medication List as of 6/26/2025  5:52 PM        CONTINUE these medications which have NOT CHANGED    Details   albuterol (2.5 mg/3 mL) 0.083 % nebulizer solution Take 3 mL (2.5 mg total) by nebulization 3 (three) times a day, Starting Thu 6/12/2025, Normal      albuterol (PROVENTIL HFA,VENTOLIN HFA) 90 mcg/act inhaler Inhale 2 puffs every 6 (six) hours as needed for wheezing or shortness of breath, Starting Thu 6/12/2025, Normal      apixaban (Eliquis) 5 mg Take 1 tablet (5 mg total) by mouth in the morning and 1 tablet (5 mg total) in the evening., Starting Thu 6/12/2025, Normal      aspirin 81 mg chewable tablet Chew 1 tablet (81 mg total) in the morning., Starting Thu 6/12/2025, Normal      atorvastatin (LIPITOR) 40 mg tablet Take 1 tablet (40 mg total) by mouth in the morning., Starting Thu 6/12/2025, Normal      Blood Glucose Monitoring Suppl (ONE TOUCH ULTRA MINI) w/Device KIT Use as directed, Starting Thu 5/16/2019, Historical Med       Blood Pressure Monitoring (Adult Blood Pressure Cuff Lg) KIT Use in the morning, Starting Thu 12/14/2023, Normal      diltiazem (CARDIZEM SR) 120 mg 12 hr capsule Take 1 capsule (120 mg total) by mouth in the morning and 1 capsule (120 mg total) in the evening., Starting Thu 6/12/2025, Normal      ferrous sulfate 325 (65 Fe) mg tablet Take 1 tablet (325 mg total) by mouth daily with breakfast, Starting Thu 6/12/2025, Normal      fluticasone-vilanterol 200-25 mcg/actuation inhaler Inhale 1 puff in the morning. Rinse mouth after use., Starting Thu 6/12/2025, Normal      folic acid (FOLVITE) 400 mcg tablet Take 1 tablet (400 mcg total) by mouth in the morning., Starting Thu 6/12/2025, Normal      gabapentin (NEURONTIN) 300 mg capsule Take 1 capsule (300 mg total) by mouth in the morning and 1 capsule (300 mg total) in the evening and 1 capsule (300 mg total) before bedtime., Starting Thu 6/12/2025, Normal      guaiFENesin (MUCINEX) 600 mg 12 hr tablet Take 1 tablet (600 mg total) by mouth every 12 (twelve) hours for 14 days, Starting Th 6/12/2025, Until Thu 6/26/2025, Normal      metoprolol succinate (TOPROL-XL) 100 mg 24 hr tablet Take 1 tablet (100 mg total) by mouth daily, Starting Th 6/12/2025, Normal      ONETOUCH DELICA LANCETS FINE MISC in the morning and in the evening and before bedtime. Test., Starting Thu 5/16/2019, Historical Med      pantoprazole (PROTONIX) 40 mg tablet Take 1 tablet (40 mg total) by mouth in the morning., Starting Thu 6/12/2025, Normal      ranolazine (RANEXA) 500 mg 12 hr tablet Take 1 tablet (500 mg total) by mouth in the morning and 1 tablet (500 mg total) in the evening., Starting Thu 6/12/2025, Normal      saccharomyces boulardii (FLORASTOR) 250 mg capsule Take 1 capsule (250 mg total) by mouth in the morning and 1 capsule (250 mg total) in the evening., Starting Thu 6/12/2025, Normal      tamsulosin (FLOMAX) 0.4 mg Take 1 capsule (0.4 mg total) by mouth daily with dinner,  Starting Thu 6/12/2025, Normal      Thiamine Mononitrate (VITAMIN B1) 100 mg tablet Take 1 tablet (100 mg total) by mouth in the morning., Starting Thu 6/12/2025, No Print      torsemide (DEMADEX) 10 mg tablet Take 1 tablet (10 mg total) by mouth every other day, Starting Thu 6/12/2025, Until Sat 7/12/2025, Normal           No discharge procedures on file.  ED SEPSIS DOCUMENTATION   Time reflects when diagnosis was documented in both MDM as applicable and the Disposition within this note       Time User Action Codes Description Comment    6/26/2025  5:20 PM Trish Madden Add [W19.XXXA] Fall, initial encounter                      [1]   Past Medical History:  Diagnosis Date    Acute encephalopathy 06/07/2025    Acute on chronic kidney failure  (HCC)     Alcohol abuse     Alcohol withdrawal (HCC) 10/09/2018    Atrial fibrillation (HCC)     Cancer (HCC)     prostate ca,had radiation    Cardiac disease     stents,then triple bypass    COPD (chronic obstructive pulmonary disease) (HCC)     Coronary artery disease     Elevated troponin 09/28/2023    ETOH abuse     Heart failure (HCC)     History of heart surgery     [facility]    Hx of heart artery stent     2014    Hyperlipidemia     Hypertension     Hypoglycemia 06/05/2025    Hyponatremia 10/17/2019    Hypovolemic shock (HCC) 12/22/2019    Increased anion gap 04/21/2021    Lumbar spondylitis (HCC) 10/13/2022    Metabolic acidosis, increased anion gap 04/21/2021    Nasal bone fracture 10/10/2022    Prostate CA (HCC)     S/P CABG x 3     2004    Sleep apnea    [2]   Past Surgical History:  Procedure Laterality Date    CARDIAC CATHETERIZATION      2 stents    CORONARY ARTERY BYPASS GRAFT      CORONARY ARTERY BYPASS GRAFT  2004    [place] ARTHRD ANT INTERBODY MIN DSC CRV BELOW C2 N/A 12/16/2020    Procedure: Anterior cervical discectomy with fusion C4-C7; Posterior cervical decompression and fusion C2-T2;  Surgeon: David Rowell MD;  Location: [facility];   "Service: Neurosurgery    TONSILLECTOMY     [3]   Family History  Problem Relation Name Age of Onset    Diabetes Mother      Uterine cancer Mother      COPD Father      Hypertension Father     [4]   Social History  Tobacco Use    Smoking status: Every Day     Current packs/day: 1.50     Average packs/day: 1.5 packs/day for 40.0 years (60.0 ttl pk-yrs)     Types: Cigarettes    Smokeless tobacco: Never   Vaping Use    Vaping status: Never Used   Substance Use Topics    Alcohol use: Yes     Alcohol/week: 4.0 standard drinks of alcohol     Types: 4 Standard drinks or equivalent per week     Comment: \"quart a day\"    Drug use: No        Trish Madden DO  06/26/25 8107    "

## 2025-06-28 VITALS
RESPIRATION RATE: 18 BRPM | BODY MASS INDEX: 22.46 KG/M2 | WEIGHT: 175 LBS | SYSTOLIC BLOOD PRESSURE: 184 MMHG | TEMPERATURE: 97.3 F | OXYGEN SATURATION: 96 % | HEART RATE: 102 BPM | HEIGHT: 74 IN | DIASTOLIC BLOOD PRESSURE: 92 MMHG

## 2025-06-28 RX ORDER — CHLORDIAZEPOXIDE HYDROCHLORIDE 25 MG/1
50 CAPSULE, GELATIN COATED ORAL ONCE
Status: COMPLETED | OUTPATIENT
Start: 2025-06-28 | End: 2025-06-28

## 2025-06-28 RX ORDER — METOPROLOL TARTRATE 50 MG
50 TABLET ORAL ONCE
Status: COMPLETED | OUTPATIENT
Start: 2025-06-28 | End: 2025-06-28

## 2025-06-28 RX ADMIN — METOPROLOL TARTRATE 50 MG: 50 TABLET, FILM COATED ORAL at 03:43

## 2025-06-28 RX ADMIN — CHLORDIAZEPOXIDE HYDROCHLORIDE 50 MG: 25 CAPSULE ORAL at 03:43

## 2025-06-29 ENCOUNTER — APPOINTMENT (EMERGENCY)
Dept: RADIOLOGY | Facility: HOSPITAL | Age: 65
DRG: 189 | End: 2025-06-29
Payer: COMMERCIAL

## 2025-06-29 ENCOUNTER — HOSPITAL ENCOUNTER (EMERGENCY)
Facility: HOSPITAL | Age: 65
Discharge: HOME | DRG: 189 | End: 2025-06-30
Attending: EMERGENCY MEDICINE | Admitting: EMERGENCY MEDICINE
Payer: COMMERCIAL

## 2025-06-29 DIAGNOSIS — F10.929 ALCOHOL INTOXICATION (HCC): ICD-10-CM
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
## 2025-06-29 DIAGNOSIS — S09.90XA INJURY OF HEAD, INITIAL ENCOUNTER: ICD-10-CM

## 2025-06-29 PROCEDURE — 99284 EMERGENCY DEPT VISIT MOD MDM: CPT | Performed by: EMERGENCY MEDICINE

## 2025-06-29 PROCEDURE — 99284 EMERGENCY DEPT VISIT MOD MDM: CPT

## 2025-06-29 PROCEDURE — 70450 CT HEAD/BRAIN W/O DYE: CPT

## 2025-06-30 ENCOUNTER — HOSPITAL ENCOUNTER (INPATIENT)
Facility: HOSPITAL | Age: 65
LOS: 4 days | Discharge: HOME | DRG: 189 | End: 2025-07-04
Attending: STUDENT IN AN ORGANIZED HEALTH CARE EDUCATION/TRAINING PROGRAM
Payer: COMMERCIAL

## 2025-06-30 ENCOUNTER — APPOINTMENT (EMERGENCY)
Dept: RADIOLOGY | Facility: HOSPITAL | Age: 65
DRG: 189 | End: 2025-06-30
Payer: COMMERCIAL

## 2025-06-30 VITALS
RESPIRATION RATE: 17 BRPM | TEMPERATURE: 98.2 F | OXYGEN SATURATION: 92 % | HEART RATE: 100 BPM | DIASTOLIC BLOOD PRESSURE: 68 MMHG | SYSTOLIC BLOOD PRESSURE: 106 MMHG

## 2025-06-30 DIAGNOSIS — I48.91 A-FIB (HCC): ICD-10-CM

## 2025-06-30 DIAGNOSIS — F10.929 ALCOHOL INTOXICATION (HCC): Primary | ICD-10-CM

## 2025-06-30 DIAGNOSIS — R09.02 HYPOXIA: ICD-10-CM

## 2025-06-30 DIAGNOSIS — Z13.9 ENCOUNTER FOR SCREENING INVOLVING SOCIAL DETERMINANTS OF HEALTH (SDOH): ICD-10-CM

## 2025-06-30 DIAGNOSIS — A04.71 RECURRENT CLOSTRIDIOIDES DIFFICILE DIARRHEA: ICD-10-CM

## 2025-06-30 PROBLEM — Z86.19 HISTORY OF CLOSTRIDIUM DIFFICILE INFECTION: Status: ACTIVE | Noted: 2025-06-30

## 2025-06-30 PROBLEM — T17.908A ASPIRATION INTO AIRWAY: Status: ACTIVE | Noted: 2025-06-30

## 2025-06-30 LAB
2HR DELTA HS TROPONIN: -1 NG/L
ALBUMIN SERPL BCG-MCNC: 3.3 G/DL (ref 3.5–5)
ALP SERPL-CCNC: 149 U/L (ref 34–104)
ALT SERPL W P-5'-P-CCNC: 29 U/L (ref 7–52)
ANION GAP SERPL CALCULATED.3IONS-SCNC: 15 MMOL/L (ref 4–13)
APTT PPP: 29 SECONDS (ref 23–34)
AST SERPL W P-5'-P-CCNC: 75 U/L (ref 13–39)
ATRIAL RATE: 150 BPM
BASOPHILS # BLD AUTO: 0.05 THOUSANDS/ÂΜL (ref 0–0.1)
BASOPHILS NFR BLD AUTO: 2 % (ref 0–1)
BILIRUB SERPL-MCNC: 0.73 MG/DL (ref 0.2–1)
BNP SERPL-MCNC: 244 PG/ML (ref 0–100)
BUN SERPL-MCNC: 14 MG/DL (ref 5–25)
CALCIUM ALBUM COR SERPL-MCNC: 8.1 MG/DL (ref 8.3–10.1)
CALCIUM SERPL-MCNC: 7.5 MG/DL (ref 8.4–10.2)
CARDIAC TROPONIN I PNL SERPL HS: 30 NG/L (ref ?–50)
CARDIAC TROPONIN I PNL SERPL HS: 31 NG/L (ref ?–50)
CHLORIDE SERPL-SCNC: 104 MMOL/L (ref 96–108)
CO2 SERPL-SCNC: 25 MMOL/L (ref 21–32)
CREAT SERPL-MCNC: 1.1 MG/DL (ref 0.6–1.3)
EOSINOPHIL # BLD AUTO: 0.02 THOUSAND/ÂΜL (ref 0–0.61)
EOSINOPHIL NFR BLD AUTO: 1 % (ref 0–6)
ERYTHROCYTE [DISTWIDTH] IN BLOOD BY AUTOMATED COUNT: 18.1 % (ref 11.6–15.1)
ETHANOL SERPL-MCNC: 392 MG/DL
GFR SERPL CREATININE-BSD FRML MDRD: 71 ML/MIN/1.73SQ M
GLUCOSE SERPL-MCNC: 77 MG/DL (ref 65–140)
HCT VFR BLD AUTO: 32.2 % (ref 36.5–49.3)
HGB BLD-MCNC: 10.4 G/DL (ref 12–17)
IMM GRANULOCYTES # BLD AUTO: 0.01 THOUSAND/UL (ref 0–0.2)
IMM GRANULOCYTES NFR BLD AUTO: 0 % (ref 0–2)
INR PPP: 1.05 (ref 0.85–1.19)
LACTATE SERPL-SCNC: 1.3 MMOL/L (ref 0.5–2)
LYMPHOCYTES # BLD AUTO: 1 THOUSANDS/ÂΜL (ref 0.6–4.47)
LYMPHOCYTES NFR BLD AUTO: 31 % (ref 14–44)
MCH RBC QN AUTO: 31 PG (ref 26.8–34.3)
MCHC RBC AUTO-ENTMCNC: 32.3 G/DL (ref 31.4–37.4)
MCV RBC AUTO: 96 FL (ref 82–98)
MONOCYTES # BLD AUTO: 0.27 THOUSAND/ÂΜL (ref 0.17–1.22)
MONOCYTES NFR BLD AUTO: 8 % (ref 4–12)
NEUTROPHILS # BLD AUTO: 1.9 THOUSANDS/ÂΜL (ref 1.85–7.62)
NEUTS SEG NFR BLD AUTO: 58 % (ref 43–75)
NRBC BLD AUTO-RTO: 1 /100 WBCS
PLATELET # BLD AUTO: 57 THOUSANDS/UL (ref 149–390)
PMV BLD AUTO: 11.1 FL (ref 8.9–12.7)
POTASSIUM SERPL-SCNC: 3.5 MMOL/L (ref 3.5–5.3)
PROCALCITONIN SERPL-MCNC: 0.09 NG/ML
PROT SERPL-MCNC: 6.5 G/DL (ref 6.4–8.4)
PROTHROMBIN TIME: 14.3 SECONDS (ref 12.3–15)
QRS AXIS: 122 DEGREES
QRSD INTERVAL: 90 MS
QT INTERVAL: 348 MS
QTC INTERVAL: 494 MS
RBC # BLD AUTO: 3.36 MILLION/UL (ref 3.88–5.62)
SODIUM SERPL-SCNC: 144 MMOL/L (ref 135–147)
T WAVE AXIS: -45 DEGREES
VENTRICULAR RATE: 121 BPM
WBC # BLD AUTO: 3.25 THOUSAND/UL (ref 4.31–10.16)

## 2025-06-30 PROCEDURE — 85025 COMPLETE CBC W/AUTO DIFF WBC: CPT | Performed by: STUDENT IN AN ORGANIZED HEALTH CARE EDUCATION/TRAINING PROGRAM

## 2025-06-30 PROCEDURE — 93010 ELECTROCARDIOGRAM REPORT: CPT | Performed by: INTERNAL MEDICINE

## 2025-06-30 PROCEDURE — 99291 CRITICAL CARE FIRST HOUR: CPT | Performed by: STUDENT IN AN ORGANIZED HEALTH CARE EDUCATION/TRAINING PROGRAM

## 2025-06-30 PROCEDURE — 36415 COLL VENOUS BLD VENIPUNCTURE: CPT | Performed by: STUDENT IN AN ORGANIZED HEALTH CARE EDUCATION/TRAINING PROGRAM

## 2025-06-30 PROCEDURE — 80053 COMPREHEN METABOLIC PANEL: CPT | Performed by: STUDENT IN AN ORGANIZED HEALTH CARE EDUCATION/TRAINING PROGRAM

## 2025-06-30 PROCEDURE — 71045 X-RAY EXAM CHEST 1 VIEW: CPT

## 2025-06-30 PROCEDURE — 85610 PROTHROMBIN TIME: CPT | Performed by: STUDENT IN AN ORGANIZED HEALTH CARE EDUCATION/TRAINING PROGRAM

## 2025-06-30 PROCEDURE — 87040 BLOOD CULTURE FOR BACTERIA: CPT | Performed by: STUDENT IN AN ORGANIZED HEALTH CARE EDUCATION/TRAINING PROGRAM

## 2025-06-30 PROCEDURE — 99223 1ST HOSP IP/OBS HIGH 75: CPT | Performed by: INTERNAL MEDICINE

## 2025-06-30 PROCEDURE — 96375 TX/PRO/DX INJ NEW DRUG ADDON: CPT

## 2025-06-30 PROCEDURE — 99285 EMERGENCY DEPT VISIT HI MDM: CPT

## 2025-06-30 PROCEDURE — 82077 ASSAY SPEC XCP UR&BREATH IA: CPT | Performed by: STUDENT IN AN ORGANIZED HEALTH CARE EDUCATION/TRAINING PROGRAM

## 2025-06-30 PROCEDURE — 83605 ASSAY OF LACTIC ACID: CPT | Performed by: STUDENT IN AN ORGANIZED HEALTH CARE EDUCATION/TRAINING PROGRAM

## 2025-06-30 PROCEDURE — 85730 THROMBOPLASTIN TIME PARTIAL: CPT | Performed by: STUDENT IN AN ORGANIZED HEALTH CARE EDUCATION/TRAINING PROGRAM

## 2025-06-30 PROCEDURE — 87154 CUL TYP ID BLD PTHGN 6+ TRGT: CPT | Performed by: STUDENT IN AN ORGANIZED HEALTH CARE EDUCATION/TRAINING PROGRAM

## 2025-06-30 PROCEDURE — 96365 THER/PROPH/DIAG IV INF INIT: CPT

## 2025-06-30 PROCEDURE — 84484 ASSAY OF TROPONIN QUANT: CPT | Performed by: STUDENT IN AN ORGANIZED HEALTH CARE EDUCATION/TRAINING PROGRAM

## 2025-06-30 PROCEDURE — 84145 PROCALCITONIN (PCT): CPT | Performed by: STUDENT IN AN ORGANIZED HEALTH CARE EDUCATION/TRAINING PROGRAM

## 2025-06-30 PROCEDURE — 94760 N-INVAS EAR/PLS OXIMETRY 1: CPT

## 2025-06-30 PROCEDURE — 72125 CT NECK SPINE W/O DYE: CPT

## 2025-06-30 PROCEDURE — 83880 ASSAY OF NATRIURETIC PEPTIDE: CPT | Performed by: STUDENT IN AN ORGANIZED HEALTH CARE EDUCATION/TRAINING PROGRAM

## 2025-06-30 PROCEDURE — 70450 CT HEAD/BRAIN W/O DYE: CPT

## 2025-06-30 PROCEDURE — 93005 ELECTROCARDIOGRAM TRACING: CPT

## 2025-06-30 RX ORDER — FERROUS SULFATE 325(65) MG
325 TABLET ORAL
Status: DISCONTINUED | OUTPATIENT
Start: 2025-07-01 | End: 2025-07-04 | Stop reason: HOSPADM

## 2025-06-30 RX ORDER — DILTIAZEM HYDROCHLORIDE 60 MG/1
120 CAPSULE, EXTENDED RELEASE ORAL 2 TIMES DAILY
Status: DISCONTINUED | OUTPATIENT
Start: 2025-06-30 | End: 2025-07-04 | Stop reason: HOSPADM

## 2025-06-30 RX ORDER — PANTOPRAZOLE SODIUM 40 MG/1
40 TABLET, DELAYED RELEASE ORAL DAILY
Status: DISCONTINUED | OUTPATIENT
Start: 2025-07-01 | End: 2025-07-04 | Stop reason: HOSPADM

## 2025-06-30 RX ORDER — HYDRALAZINE HYDROCHLORIDE 20 MG/ML
5 INJECTION INTRAMUSCULAR; INTRAVENOUS EVERY 6 HOURS PRN
Status: DISCONTINUED | OUTPATIENT
Start: 2025-06-30 | End: 2025-07-04 | Stop reason: HOSPADM

## 2025-06-30 RX ORDER — FOLIC ACID 1 MG/1
1 TABLET ORAL DAILY
Status: DISCONTINUED | OUTPATIENT
Start: 2025-07-01 | End: 2025-07-04 | Stop reason: HOSPADM

## 2025-06-30 RX ORDER — MAGNESIUM SULFATE HEPTAHYDRATE 40 MG/ML
2 INJECTION, SOLUTION INTRAVENOUS ONCE
Status: COMPLETED | OUTPATIENT
Start: 2025-06-30 | End: 2025-06-30

## 2025-06-30 RX ORDER — RANOLAZINE 500 MG/1
500 TABLET, EXTENDED RELEASE ORAL 2 TIMES DAILY
Status: DISCONTINUED | OUTPATIENT
Start: 2025-06-30 | End: 2025-07-04 | Stop reason: HOSPADM

## 2025-06-30 RX ORDER — METOPROLOL SUCCINATE 50 MG/1
100 TABLET, EXTENDED RELEASE ORAL DAILY
Status: DISCONTINUED | OUTPATIENT
Start: 2025-07-01 | End: 2025-07-04 | Stop reason: HOSPADM

## 2025-06-30 RX ORDER — METOPROLOL TARTRATE 1 MG/ML
5 INJECTION, SOLUTION INTRAVENOUS ONCE
Status: COMPLETED | OUTPATIENT
Start: 2025-06-30 | End: 2025-06-30

## 2025-06-30 RX ORDER — SODIUM CHLORIDE 9 MG/ML
75 INJECTION, SOLUTION INTRAVENOUS CONTINUOUS
Status: DISCONTINUED | OUTPATIENT
Start: 2025-06-30 | End: 2025-06-30

## 2025-06-30 RX ORDER — CEFTRIAXONE 1 G/50ML
1000 INJECTION, SOLUTION INTRAVENOUS ONCE
Status: DISCONTINUED | OUTPATIENT
Start: 2025-06-30 | End: 2025-07-04

## 2025-06-30 RX ORDER — FUROSEMIDE 10 MG/ML
40 INJECTION INTRAMUSCULAR; INTRAVENOUS ONCE
Status: DISCONTINUED | OUTPATIENT
Start: 2025-06-30 | End: 2025-07-01

## 2025-06-30 RX ORDER — ASPIRIN 81 MG/1
81 TABLET, CHEWABLE ORAL DAILY
Status: DISCONTINUED | OUTPATIENT
Start: 2025-07-01 | End: 2025-07-04 | Stop reason: HOSPADM

## 2025-06-30 RX ORDER — CEFTRIAXONE 1 G/50ML
1000 INJECTION, SOLUTION INTRAVENOUS EVERY 24 HOURS
Status: DISCONTINUED | OUTPATIENT
Start: 2025-07-01 | End: 2025-07-04

## 2025-06-30 RX ORDER — SACCHAROMYCES BOULARDII 250 MG
250 CAPSULE ORAL 2 TIMES DAILY
Status: DISCONTINUED | OUTPATIENT
Start: 2025-06-30 | End: 2025-07-04 | Stop reason: HOSPADM

## 2025-06-30 RX ORDER — GUAIFENESIN/DEXTROMETHORPHAN 100-10MG/5
10 SYRUP ORAL EVERY 4 HOURS PRN
Status: DISCONTINUED | OUTPATIENT
Start: 2025-06-30 | End: 2025-07-04 | Stop reason: HOSPADM

## 2025-06-30 RX ORDER — ATORVASTATIN CALCIUM 40 MG/1
40 TABLET, FILM COATED ORAL
Status: DISCONTINUED | OUTPATIENT
Start: 2025-06-30 | End: 2025-07-04 | Stop reason: HOSPADM

## 2025-06-30 RX ORDER — LEVALBUTEROL INHALATION SOLUTION 1.25 MG/3ML
1.25 SOLUTION RESPIRATORY (INHALATION) EVERY 8 HOURS PRN
Status: DISCONTINUED | OUTPATIENT
Start: 2025-06-30 | End: 2025-07-04 | Stop reason: HOSPADM

## 2025-06-30 RX ORDER — SODIUM CHLORIDE FOR INHALATION 0.9 %
3 VIAL, NEBULIZER (ML) INHALATION EVERY 6 HOURS PRN
Status: DISCONTINUED | OUTPATIENT
Start: 2025-06-30 | End: 2025-06-30

## 2025-06-30 RX ORDER — ACETAMINOPHEN 325 MG/1
650 TABLET ORAL EVERY 6 HOURS PRN
Status: DISCONTINUED | OUTPATIENT
Start: 2025-06-30 | End: 2025-07-04 | Stop reason: HOSPADM

## 2025-06-30 RX ORDER — LANOLIN ALCOHOL/MO/W.PET/CERES
100 CREAM (GRAM) TOPICAL DAILY
Status: DISCONTINUED | OUTPATIENT
Start: 2025-07-01 | End: 2025-07-04 | Stop reason: HOSPADM

## 2025-06-30 RX ORDER — LORAZEPAM 1 MG/1
2 TABLET ORAL ONCE
Status: COMPLETED | OUTPATIENT
Start: 2025-06-30 | End: 2025-06-30

## 2025-06-30 RX ORDER — TAMSULOSIN HYDROCHLORIDE 0.4 MG/1
0.4 CAPSULE ORAL
Status: DISCONTINUED | OUTPATIENT
Start: 2025-06-30 | End: 2025-07-04 | Stop reason: HOSPADM

## 2025-06-30 RX ORDER — GABAPENTIN 300 MG/1
300 CAPSULE ORAL 3 TIMES DAILY
Status: DISCONTINUED | OUTPATIENT
Start: 2025-06-30 | End: 2025-07-04 | Stop reason: HOSPADM

## 2025-06-30 RX ORDER — FLUTICASONE FUROATE AND VILANTEROL 200; 25 UG/1; UG/1
1 POWDER RESPIRATORY (INHALATION)
Status: DISCONTINUED | OUTPATIENT
Start: 2025-07-01 | End: 2025-07-04 | Stop reason: HOSPADM

## 2025-06-30 RX ADMIN — Medication 125 MG: at 21:43

## 2025-06-30 RX ADMIN — LORAZEPAM 2 MG: 1 TABLET ORAL at 23:00

## 2025-06-30 RX ADMIN — FOLIC ACID 1 MG: 5 INJECTION, SOLUTION INTRAMUSCULAR; INTRAVENOUS; SUBCUTANEOUS at 17:05

## 2025-06-30 RX ADMIN — GABAPENTIN 300 MG: 300 CAPSULE ORAL at 21:09

## 2025-06-30 RX ADMIN — METOPROLOL TARTRATE 5 MG: 5 INJECTION INTRAVENOUS at 15:52

## 2025-06-30 RX ADMIN — MAGNESIUM SULFATE HEPTAHYDRATE 2 G: 40 INJECTION, SOLUTION INTRAVENOUS at 15:56

## 2025-06-30 RX ADMIN — METOPROLOL TARTRATE 5 MG: 5 INJECTION INTRAVENOUS at 17:05

## 2025-06-30 RX ADMIN — THIAMINE HYDROCHLORIDE 100 MG: 100 INJECTION, SOLUTION INTRAMUSCULAR; INTRAVENOUS at 16:45

## 2025-06-30 NOTE — ASSESSMENT & PLAN NOTE
Chronic/ongoing issue  [facility] protocol  Continue supplemental thiamine/folic acid and multivitamin  Frequent flyer in the ED due to recurrent episodes of intoxication

## 2025-06-30 NOTE — ED PROVIDER NOTES
Time reflects when diagnosis was documented in both MDM as applicable and the Disposition within this note       Time User Action Codes Description Comment    6/30/2025  4:53 PM Gilberto North Add [F10.929] Alcohol intoxication (HCC)     6/30/2025  7:33 PM Gilberto North Add [R09.02] Hypoxia     6/30/2025  7:33 PM Gilberto North Add [I48.91] A-fib (HCC)           ED Disposition       ED Disposition   Admit    Condition   Stable    Date/Time   Mon Jun 30, 2025  7:33 PM    Comment   Case was discussed with DORINDA and the patient's admission status was agreed to be Admission Status: observation status to the service of [facility]               Assessment & Plan       Medical Decision Making  Patient is a 65 y.o. male who presents to the ED for alcohol intoxication, generalized weakness, hypoxia.  Patient is nontoxic, well-appearing.  Noted to be in A-fib with RVR.    Differential for recurrent hypoxia includes mucous plugging, atelectasis, aspiration.  Low suspicion for bacterial pneumonia.  Low suspicion for pneumothorax.    Plan: Labs, imaging, rate control, admit                   Amount and/or Complexity of Data Reviewed  Labs: ordered.  Radiology: ordered.    Risk  Prescription drug management.  Decision regarding hospitalization.        ED Course as of 06/30/25 1937 Mon Jun 30, 2025   1653 Critical Care Time Statement: Upon my evaluation, this patient had a high probability of imminent or life-threatening deterioration due to afib rvr, which required my direct attention, intervention, and personal management.  I spent a total of 36 minutes directly providing critical care services, including interpretation of complex medical databases, evaluating for the presence of life-threatening injuries or illnesses, management of organ system failure(s) , complex medical decision making (to support/prevent further life-threatening deterioration)., and interpretation of hemodynamic data. This time is exclusive of procedures,  teaching, treating other patients, family meetings, and any prior time recorded by providers other than myself.         Medications   cefTRIAXone (ROCEPHIN) IVPB (premix in dextrose) 1,000 mg 50 mL (has no administration in time range)   vancomycin (VANCOCIN) oral solution 125 mg (has no administration in time range)   metoprolol (LOPRESSOR) injection 5 mg (5 mg Intravenous Given 6/30/25 1552)   magnesium sulfate 2 g/50 mL IVPB (premix) 2 g (0 g Intravenous Stopped 6/30/25 1618)   metoprolol (LOPRESSOR) injection 5 mg (5 mg Intravenous Given 6/30/25 1705)   thiamine (VITAMIN B1) 100 mg in dextrose 5 % 100 mL IVPB (100 mg Intravenous New Bag 6/30/25 1645)   folic acid 1 mg in sodium chloride 0.9 % 50 mL IVPB (1 mg Intravenous New Bag 6/30/25 1705)       ED Risk Strat Scores                    No data recorded        SBIRT 22yo+      Flowsheet Row Most Recent Value   Initial Alcohol Screen: US AUDIT-C     1. How often do you have a drink containing alcohol? 6 Filed at: 06/30/2025 1414   Audit-C Score 6 Filed at: 06/30/2025 1414   BRIGIDA: How many times in the past year have you...    Used an illegal drug or used a prescription medication for non-medical reasons? Never Filed at: 06/30/2025 1414                            History of Present Illness       Chief Complaint   Patient presents with    Alcohol Intoxication     Could not transfer from chair to walker due to intoxication       Past Medical History[1]   Past Surgical History[2]   Family History[3]   Social History[4]   E-Cigarette/Vaping    E-Cigarette Use Never User       E-Cigarette/Vaping Substances    Nicotine Yes     THC No     CBD No     Flavoring No     Other No     Unknown No       I have reviewed and agree with the history as documented.     65-year-old male, past medical history including alcohol use, with frequent ED visits, who presents to the emergency room for generalized weakness.  Was seen here earlier this morning for alcohol intoxication.   Ultimately discharged.  Went home.  Again felt generally weak so called 9 1.  On arrival patient noted to be hypoxic.  Denies any shortness of breath, chest pain, cough, fevers, chills.  Requesting a sandwich.  Will not tell me if he fell or not.  No other complaints or concerns.    Chart reviewed.  Patient with admission earlier this month for, among other things, hypoxia.  Thought to be secondary to mucous plugging versus aspiration.  Treated conservatively without antibiotics and improved.      Alcohol Intoxication  Associated symptoms: weakness        Review of Systems   Neurological:  Positive for weakness.   All other systems reviewed and are negative.          Objective       ED Triage Vitals   Temperature Pulse Blood Pressure Respirations SpO2 Patient Position - Orthostatic VS   06/30/25 1812 06/30/25 1432 06/30/25 1424 06/30/25 1530 06/30/25 1424 06/30/25 1424   98.2 °F (36.8 °C) 78 112/69 (!) 25 (!) 84 % Lying      Temp Source Heart Rate Source BP Location FiO2 (%) Pain Score    06/30/25 1812 -- 06/30/25 1424 -- --    Oral  Left arm        Vitals      Date and Time Temp Pulse SpO2 Resp BP Pain Score FACES Pain Rating User   06/30/25 1826 -- 100 97 % -- 85/49 -- -- DII   06/30/25 1812 -- 114 Simultaneous filing. User may not have seen previous data. 97 % Simultaneous filing. User may not have seen previous data. 20 -- -- -- TD   06/30/25 1812 98.2 °F (36.8 °C) -- -- -- 66/36 -- -- KS   06/30/25 1732 -- 97 99 % 20 -- -- --    06/30/25 1632 -- 107 97 % 29 -- -- --    06/30/25 1604 -- 110 -- -- -- -- --    06/30/25 1600 -- 121 88 % 35 134/60 -- --    06/30/25 1530 -- 145 95 % 25 -- -- --    06/30/25 1432 -- 78 92 % -- 103/59 -- --    06/30/25 1424 -- -- 84 % -- 112/69 -- -- FS            Physical Exam  Vitals and nursing note reviewed.   Constitutional:       General: He is not in acute distress.     Appearance: He is well-developed. He is not ill-appearing, toxic-appearing or diaphoretic.   HENT:       Head: Normocephalic and atraumatic.      Right Ear: External ear normal.      Left Ear: External ear normal.      Nose: Nose normal.     Eyes:      General: Lids are normal. No scleral icterus.      Cardiovascular:      Rate and Rhythm: Tachycardia present. Rhythm irregular.      Heart sounds: Normal heart sounds. No murmur heard.     No friction rub. No gallop.   Pulmonary:      Effort: Pulmonary effort is normal. No respiratory distress.      Breath sounds: No wheezing, rhonchi or rales.      Comments: Coarse breath sounds bilaterally  Abdominal:      Palpations: Abdomen is soft.      Tenderness: There is no abdominal tenderness. There is no guarding or rebound.     Musculoskeletal:         General: No deformity. Normal range of motion.      Cervical back: Normal range of motion and neck supple.     Skin:     General: Skin is warm and dry.     Neurological:      General: No focal deficit present.      Mental Status: He is alert.     Psychiatric:         Mood and Affect: Mood normal.         Behavior: Behavior normal.         Results Reviewed       Procedure Component Value Units Date/Time    Procalcitonin [[id number]]  (Normal) Collected: 06/30/25 1725    Lab Status: Final result Specimen: Blood from Arm, Left Updated: 06/30/25 1804     Procalcitonin 0.09 ng/ml     HS Troponin I 2hr [[id number]]  (Normal) Collected: 06/30/25 1725    Lab Status: Final result Specimen: Blood from Arm, Left Updated: 06/30/25 1801     hs TnI 2hr 30 ng/L      Delta 2hr hsTnI -1 ng/L     Lactic acid [[id number]]  (Normal) Collected: 06/30/25 1725    Lab Status: Final result Specimen: Blood from Arm, Left Updated: 06/30/25 1757     LACTIC ACID 1.3 mmol/L     Narrative:      Result may be elevated if tourniquet was used during collection.    Protime-INR [[id number]]  (Normal) Collected: 06/30/25 1725    Lab Status: Final result Specimen: Blood from Arm, Left Updated: 06/30/25 1752     Protime 14.3 seconds      INR 1.05    Narrative:       INR Therapeutic Range    Indication                                             INR Range      Atrial Fibrillation                                               2.0-3.0  Hypercoagulable State                                    2.0.2.3  Left Ventricular Asist Device                            2.0-3.0  Mechanical Heart Valve                                  -    Aortic(with afib, MI, embolism, HF, LA enlargement,    and/or coagulopathy)                                     2.0-3.0 (2.5-3.5)     Mitral                                                             2.5-3.5  Prosthetic/Bioprosthetic Heart Valve               2.0-3.0  Venous thromboembolism (VTE: VT, PE        2.0-3.0    APTT [[id number]]  (Normal) Collected: 06/30/25 1725    Lab Status: Final result Specimen: Blood from Arm, Left Updated: 06/30/25 1752     PTT 29 seconds     Legionella antigen, urine [[id number]]     Lab Status: No result Specimen: Urine     Strep Pneumoniae, Urine [[id number]]     Lab Status: No result Specimen: Urine     MRSA culture [[id number]]     Lab Status: No result Specimen: Nares     Sputum culture and Gram stain [[id number]]     Lab Status: No result Specimen: Sputum     Blood culture #2 [[id number]] Collected: 06/30/25 1725    Lab Status: In process Specimen: Blood from Arm, Right Updated: 06/30/25 1735    Blood culture #1 [[id number]] Collected: 06/30/25 1725    Lab Status: In process Specimen: Blood from Arm, Left Updated: 06/30/25 1735    UA w Reflex to Microscopic w Reflex to Culture [[id number]]     Lab Status: No result Specimen: Urine     HS Troponin I 4hr [[id number]]     Lab Status: No result Specimen: Blood     HS Troponin 0hr (reflex protocol) [[id number]]  (Normal) Collected: 06/30/25 1458    Lab Status: Final result Specimen: Blood from Arm, Left Updated: 06/30/25 1528     hs TnI 0hr 31 ng/L     Ethanol [[id number]]  (Abnormal) Collected: 06/30/25 1458    Lab Status: Final result Specimen: Blood from Arm, Left Updated: 06/30/25  1528     Ethanol Lvl 392 mg/dL     Comprehensive metabolic panel [[id number]]  (Abnormal) Collected: 06/30/25 1458    Lab Status: Final result Specimen: Blood from Arm, Left Updated: 06/30/25 1528     Sodium 144 mmol/L      Potassium 3.5 mmol/L      Chloride 104 mmol/L      CO2 25 mmol/L      ANION GAP 15 mmol/L      BUN 14 mg/dL      Creatinine 1.10 mg/dL      Glucose 77 mg/dL      Calcium 7.5 mg/dL      Corrected Calcium 8.1 mg/dL      AST 75 U/L      ALT 29 U/L      Alkaline Phosphatase 149 U/L      Total Protein 6.5 g/dL      Albumin 3.3 g/dL      Total Bilirubin 0.73 mg/dL      eGFR 71 ml/min/1.73sq m     Narrative:      [employer] guidelines for Chronic Kidney Disease (CKD):     Stage 1 with normal or high GFR (GFR > 90 mL/min/1.73 square meters)    Stage 2 Mild CKD (GFR = 60-89 mL/min/1.73 square meters)    Stage 3A Moderate CKD (GFR = 45-59 mL/min/1.73 square meters)    Stage 3B Moderate CKD (GFR = 30-44 mL/min/1.73 square meters)    Stage 4 Severe CKD (GFR = 15-29 mL/min/1.73 square meters)    Stage 5 End Stage CKD (GFR <15 mL/min/1.73 square meters)  Note: GFR calculation is accurate only with a steady state creatinine    B-Type Natriuretic Peptide(BNP) [[id number]]  (Abnormal) Collected: 06/30/25 1458    Lab Status: Final result Specimen: Blood from Arm, Left Updated: 06/30/25 1528      pg/mL     CBC and differential [[id number]]  (Abnormal) Collected: 06/30/25 1458    Lab Status: Final result Specimen: Blood from Arm, Left Updated: 06/30/25 1520     WBC 3.25 Thousand/uL      RBC 3.36 Million/uL      Hemoglobin 10.4 g/dL      Hematocrit 32.2 %      MCV 96 fL      MCH 31.0 pg      MCHC 32.3 g/dL      RDW 18.1 %      MPV 11.1 fL      Platelets 57 Thousands/uL      nRBC 1 /100 WBCs      Segmented % 58 %      Immature Grans % 0 %      Lymphocytes % 31 %      Monocytes % 8 %      Eosinophils Relative 1 %      Basophils Relative 2 %      Absolute Neutrophils 1.90 Thousands/µL       Absolute Immature Grans 0.01 Thousand/uL      Absolute Lymphocytes 1.00 Thousands/µL      Absolute Monocytes 0.27 Thousand/µL      Eosinophils Absolute 0.02 Thousand/µL      Basophils Absolute 0.05 Thousands/µL             CT head without contrast   Final Interpretation by Navneet Elena MD (06/30 1534)      1.  No acute intracranial abnormality.   2.  No acute cervical spine fracture or traumatic malalignment.   3.  Extensive postsurgical changes in the cervical spine with a posterior paraspinal fluid collection spanning the surgical levels.   This is unchanged from prior exam.                  Workstation performed: [id number]         CT spine cervical without contrast   Final Interpretation by Navneet Elena MD (06/30 1534)      1.  No acute intracranial abnormality.   2.  No acute cervical spine fracture or traumatic malalignment.   3.  Extensive postsurgical changes in the cervical spine with a posterior paraspinal fluid collection spanning the surgical levels.   This is unchanged from prior exam.                  Workstation performed: [id number]         XR chest 1 view portable   ED Interpretation by Gilberto North DO (06/30 1937)   Mild vascular congestion          ECG 12 Lead Documentation Only    Date/Time: 6/30/2025 7:35 PM    Performed by: Gilberto North DO  Authorized by: Gilberto North DO    ECG reviewed by me, the ED Provider: yes    Patient location:  ED  Interpretation:     Interpretation: abnormal    Rate:     ECG rate:  121    ECG rate assessment: tachycardic    Rhythm:     Rhythm: atrial fibrillation    Ectopy:     Ectopy: none    QRS:     QRS axis:  Right  ST segments:     ST segments:  Normal  T waves:     T waves: normal        ED Medication and Procedure Management   Prior to Admission Medications   Prescriptions Last Dose Informant Patient Reported? Taking?   Blood Glucose Monitoring Suppl (ONE TOUCH ULTRA MINI) w/Device KIT  Self Yes No   Sig: Use as directed   Patient not taking:  Reported on 6/5/2025   Blood Pressure Monitoring (Adult Blood Pressure Cuff Lg) KIT   No No   Sig: Use in the morning   Patient not taking: Reported on 6/5/2025   ONETOUCH DELICA LANCETS FINE MISC  Self Yes No   Sig: in the morning and in the evening and before bedtime. Test.   Patient not taking: Reported on 6/5/2025   Thiamine Mononitrate (VITAMIN B1) 100 mg tablet Unknown  No No   Sig: Take 1 tablet (100 mg total) by mouth in the morning.   albuterol (2.5 mg/3 mL) 0.083 % nebulizer solution   No No   Sig: Take 3 mL (2.5 mg total) by nebulization 3 (three) times a day   albuterol (PROVENTIL HFA,VENTOLIN HFA) 90 mcg/act inhaler   No No   Sig: Inhale 2 puffs every 6 (six) hours as needed for wheezing or shortness of breath   apixaban (Eliquis) 5 mg Unknown  No No   Sig: Take 1 tablet (5 mg total) by mouth in the morning and 1 tablet (5 mg total) in the evening.   aspirin 81 mg chewable tablet Unknown  No No   Sig: Chew 1 tablet (81 mg total) in the morning.   atorvastatin (LIPITOR) 40 mg tablet Unknown  No No   Sig: Take 1 tablet (40 mg total) by mouth in the morning.   diltiazem (CARDIZEM SR) 120 mg 12 hr capsule Unknown  No No   Sig: Take 1 capsule (120 mg total) by mouth in the morning and 1 capsule (120 mg total) in the evening.   ferrous sulfate 325 (65 Fe) mg tablet Unknown  No No   Sig: Take 1 tablet (325 mg total) by mouth daily with breakfast   fluticasone-vilanterol 200-25 mcg/actuation inhaler Unknown  No No   Sig: Inhale 1 puff in the morning. Rinse mouth after use.   folic acid (FOLVITE) 400 mcg tablet Unknown  No No   Sig: Take 1 tablet (400 mcg total) by mouth in the morning.   gabapentin (NEURONTIN) 300 mg capsule Unknown  No No   Sig: Take 1 capsule (300 mg total) by mouth in the morning and 1 capsule (300 mg total) in the evening and 1 capsule (300 mg total) before bedtime.   metoprolol succinate (TOPROL-XL) 100 mg 24 hr tablet Unknown  No No   Sig: Take 1 tablet (100 mg total) by mouth daily    pantoprazole (PROTONIX) 40 mg tablet Unknown  No No   Sig: Take 1 tablet (40 mg total) by mouth in the morning.   ranolazine (RANEXA) 500 mg 12 hr tablet Unknown  No No   Sig: Take 1 tablet (500 mg total) by mouth in the morning and 1 tablet (500 mg total) in the evening.   saccharomyces boulardii (FLORASTOR) 250 mg capsule Unknown  No No   Sig: Take 1 capsule (250 mg total) by mouth in the morning and 1 capsule (250 mg total) in the evening.   tamsulosin (FLOMAX) 0.4 mg Unknown  No No   Sig: Take 1 capsule (0.4 mg total) by mouth daily with dinner   torsemide (DEMADEX) 10 mg tablet Unknown  No No   Sig: Take 1 tablet (10 mg total) by mouth every other day      Facility-Administered Medications: None     Current Discharge Medication List        CONTINUE these medications which have NOT CHANGED    Details   albuterol (2.5 mg/3 mL) 0.083 % nebulizer solution Take 3 mL (2.5 mg total) by nebulization 3 (three) times a day  Qty: 270 mL, Refills: 0    Associated Diagnoses: Opacity of lung on imaging study      albuterol (PROVENTIL HFA,VENTOLIN HFA) 90 mcg/act inhaler Inhale 2 puffs every 6 (six) hours as needed for wheezing or shortness of breath  Qty: 6.7 g, Refills: 0    Comments: Substitution to a formulary equivalent within the same pharmaceutical class is authorized.  Associated Diagnoses: Chronic obstructive pulmonary disease, unspecified COPD type (HCC)      apixaban (Eliquis) 5 mg Take 1 tablet (5 mg total) by mouth in the morning and 1 tablet (5 mg total) in the evening.  Qty: 60 tablet, Refills: 0    Associated Diagnoses: A-fib (HCC)      aspirin 81 mg chewable tablet Chew 1 tablet (81 mg total) in the morning.  Qty: 30 tablet, Refills: 0    Associated Diagnoses: Coronary artery disease, unspecified vessel or lesion type, unspecified whether angina present, unspecified whether native or transplanted heart      atorvastatin (LIPITOR) 40 mg tablet Take 1 tablet (40 mg total) by mouth in the morning.  Qty: 30  tablet, Refills: 0    Associated Diagnoses: Coronary artery disease, unspecified vessel or lesion type, unspecified whether angina present, unspecified whether native or transplanted heart      Blood Glucose Monitoring Suppl (ONE TOUCH ULTRA MINI) w/Device KIT Use as directed  Refills: 0      Blood Pressure Monitoring (Adult Blood Pressure Cuff Lg) KIT Use in the morning  Qty: 1 kit, Refills: 0    Associated Diagnoses: Hypertension      diltiazem (CARDIZEM SR) 120 mg 12 hr capsule Take 1 capsule (120 mg total) by mouth in the morning and 1 capsule (120 mg total) in the evening.  Qty: 60 capsule, Refills: 0    Associated Diagnoses: A-fib (HCC)      ferrous sulfate 325 (65 Fe) mg tablet Take 1 tablet (325 mg total) by mouth daily with breakfast  Qty: 30 tablet, Refills: 0    Associated Diagnoses: Anemia      fluticasone-vilanterol 200-25 mcg/actuation inhaler Inhale 1 puff in the morning. Rinse mouth after use.  Qty: 60 each, Refills: 0    Associated Diagnoses: Chronic obstructive pulmonary disease, unspecified COPD type (HCC)      folic acid (FOLVITE) 400 mcg tablet Take 1 tablet (400 mcg total) by mouth in the morning.  Qty: 30 tablet, Refills: 0    Associated Diagnoses: Chronic alcohol use      gabapentin (NEURONTIN) 300 mg capsule Take 1 capsule (300 mg total) by mouth in the morning and 1 capsule (300 mg total) in the evening and 1 capsule (300 mg total) before bedtime.  Qty: 90 capsule, Refills: 0    Associated Diagnoses: Type 2 diabetes mellitus, without long-term current use of insulin (HCC)      metoprolol succinate (TOPROL-XL) 100 mg 24 hr tablet Take 1 tablet (100 mg total) by mouth daily  Qty: 30 tablet, Refills: 0    Associated Diagnoses: A-fib (HCC)      ONETOUCH DELICA LANCETS FINE MISC in the morning and in the evening and before bedtime. Test.  Refills: 0      pantoprazole (PROTONIX) 40 mg tablet Take 1 tablet (40 mg total) by mouth in the morning.  Qty: 30 tablet, Refills: 0    Associated Diagnoses:  Gastroesophageal reflux disease, unspecified whether esophagitis present      ranolazine (RANEXA) 500 mg 12 hr tablet Take 1 tablet (500 mg total) by mouth in the morning and 1 tablet (500 mg total) in the evening.  Qty: 60 tablet, Refills: 0    Associated Diagnoses: Chronic heart failure with preserved ejection fraction (HCC)      saccharomyces boulardii (FLORASTOR) 250 mg capsule Take 1 capsule (250 mg total) by mouth in the morning and 1 capsule (250 mg total) in the evening.  Qty: 60 capsule, Refills: 0    Associated Diagnoses: Recurrent Clostridioides difficile diarrhea      tamsulosin (FLOMAX) 0.4 mg Take 1 capsule (0.4 mg total) by mouth daily with dinner  Qty: 30 capsule, Refills: 0    Associated Diagnoses: Benign prostatic hyperplasia, unspecified whether lower urinary tract symptoms present      Thiamine Mononitrate (VITAMIN B1) 100 mg tablet Take 1 tablet (100 mg total) by mouth in the morning.    Associated Diagnoses: Chronic alcohol use      torsemide (DEMADEX) 10 mg tablet Take 1 tablet (10 mg total) by mouth every other day  Qty: 15 tablet, Refills: 0    Associated Diagnoses: Electrolyte abnormality           No discharge procedures on file.  ED SEPSIS DOCUMENTATION   Time reflects when diagnosis was documented in both MDM as applicable and the Disposition within this note       Time User Action Codes Description Comment    6/30/2025  4:53 PM Gilberto North Add [F10.929] Alcohol intoxication ([facility])     6/30/2025  7:33 PM Gilberto North Add [R09.02] Hypoxia     6/30/2025  7:33 PM Gilberto North Add [I48.91] A-fib ([facility])            Initial Sepsis Screening       Row Name 06/30/25 1932                Is the patient's history suggestive of a new or worsening infection? No  -CJ                  User Key  (r) = Recorded By, (t) = Taken By, (c) = Cosigned By      Initials Name Provider Type    HEMANTH North DO Physician                           [1]   Past Medical History:  Diagnosis Date    Acute  "encephalopathy 06/07/2025    Acute on chronic kidney failure  (HCC)     Alcohol abuse     Alcohol withdrawal (HCC) 10/09/2018    Atrial fibrillation (HCC)     Cancer (HCC)     prostate ca,had radiation    Cardiac disease     stents,then triple bypass    COPD (chronic obstructive pulmonary disease) (HCC)     Coronary artery disease     Elevated troponin 09/28/2023    ETOH abuse     Heart failure (HCC)     History of heart surgery     says triple bypass [facility]    Hx of heart artery stent     2014    Hyperlipidemia     Hypertension     Hypoglycemia 06/05/2025    Hyponatremia 10/17/2019    Hypovolemic shock (HCC) 12/22/2019    Increased anion gap 04/21/2021    Lumbar spondylitis (HCC) 10/13/2022    Metabolic acidosis, increased anion gap 04/21/2021    Nasal bone fracture 10/10/2022    Prostate CA (HCC)     S/P CABG x 3     2004    Sleep apnea    [2]   Past Surgical History:  Procedure Laterality Date    CARDIAC CATHETERIZATION      2 stents    CORONARY ARTERY BYPASS GRAFT      CORONARY ARTERY BYPASS GRAFT  2004    RI ARTHRD ANT INTERBODY MIN DSC CRV BELOW C2 N/A 12/16/2020    Procedure: Anterior cervical discectomy with fusion C4-C7; Posterior cervical decompression and fusion C2-T2;  Surgeon: David Rowell MD;  Location: [facility];  Service: Neurosurgery    TONSILLECTOMY     [3]   Family History  Problem Relation Name Age of Onset    Diabetes Mother      Uterine cancer Mother      COPD Father      Hypertension Father     [4]   Social History  Tobacco Use    Smoking status: Every Day     Current packs/day: 1.50     Average packs/day: 1.5 packs/day for 40.0 years (60.0 ttl pk-yrs)     Types: Cigarettes    Smokeless tobacco: Never   Vaping Use    Vaping status: Never Used   Substance Use Topics    Alcohol use: Yes     Alcohol/week: 4.0 standard drinks of alcohol     Types: 4 Standard drinks or equivalent per week     Comment: \"quart a day\"    Drug use: No        Gilberto North DO  06/30/25 1937    "

## 2025-06-30 NOTE — ASSESSMENT & PLAN NOTE
On presentation with tachycardia/tachypnea and leukopenia (although decreased WBC count is more long-term)  Likely reactive due to aspiration episode (see below)  Unclear whether true infectious etiology -> pending procalcitonin and blood cultures  Monitor vitals and maintain hemodynamics

## 2025-06-30 NOTE — ASSESSMENT & PLAN NOTE
With transient RVR on presentation, now improved   Continue Cardizem/Toprol XL for rate control  On Eliquis for secondary stroke prevention

## 2025-06-30 NOTE — ASSESSMENT & PLAN NOTE
Occasionally recurrent episode due to chronic alcoholism and suspected aspiration episodes (see below)  Baseline oxygenation at room air -> currently requiring up to 4 L via nasal cannula -> wean down to baseline, as tolerated  CXR, although pending official report, on personal review with a degree of right-sided haziness  On respiratory and airway clearance protocols

## 2025-06-30 NOTE — ASSESSMENT & PLAN NOTE
In the setting of alcohol intoxication  Met SIRS criteria (as above)  Can consider aspiration pneumonitis vs pneumonia ??  Initiated on IV Rocephin in the ED -> will continue, for now, pending infectious workup  Await speech therapy evaluation once sober  Aspiration precautions  Check urine Legionella/Streptococcus Ag - check MRSA swab and sputum cultures (if produced)  Maintain supplemental oxygenation as necessary - PRN Robitussin on board  Respiratory and airway clearance protocols

## 2025-06-30 NOTE — ASSESSMENT & PLAN NOTE
Likely sequela of chronic alcoholism  Monitor CBC, and transfuse if hemoglobin/platelet thresholds are met

## 2025-06-30 NOTE — ED PROVIDER NOTES
Time reflects when diagnosis was documented in both MDM as applicable and the Disposition within this note       Time User Action Codes Description Comment    6/29/2025 11:15 PM Quinn Krishna [W19.XXXA] Fall, initial encounter     6/29/2025 11:15 PM Quinn Krishna [S09.90XA] Injury of head, initial encounter     6/29/2025 11:15 PM Quinn Krishna [F10.929] Alcohol intoxication (HCC)           ED Disposition       ED Disposition   Discharge    Condition   Stable    Date/Time   Sun Jun 29, 2025 11:15 PM    Comment   Gregg Partida discharge to home/self care.                   Assessment & Plan       Medical Decision Making  Pulse ox 92% on room air indicating adequate oxygenation.    Amount and/or Complexity of Data Reviewed  Radiology: ordered.             Medications - No data to display    ED Risk Strat Scores                    No data recorded                            History of Present Illness       Chief Complaint   Patient presents with    Fall     Pt fell @ home after drinking  called 911 states possibly hit head       Past Medical History[1]   Past Surgical History[2]   Family History[3]   Social History[4]   E-Cigarette/Vaping    E-Cigarette Use Never User       E-Cigarette/Vaping Substances    Nicotine Yes     THC No     CBD No     Flavoring No     Other No     Unknown No       I have reviewed and agree with the history as documented.     Patient brought in by EMS from home for evaluation of alcohol intoxication.  Patient states he called 911 after he fell.  States he might of hit his head.  No other complaints.      History provided by:  Patient   used: No    Fall      Review of Systems   All other systems reviewed and are negative.          Objective       ED Triage Vitals   Temperature Pulse Blood Pressure Respirations SpO2 Patient Position - Orthostatic VS   06/30/25 0046 06/29/25 2159 06/29/25 2159 06/29/25 2159 06/29/25 2159 --   98.2 °F (36.8 °C) 100 106/68 17 92  %       Temp src Heart Rate Source BP Location FiO2 (%) Pain Score    -- 06/29/25 2159 -- -- --     Monitor         Vitals      Date and Time Temp Pulse SpO2 Resp BP Pain Score FACES Pain Rating User   06/30/25 0046 98.2 °F (36.8 °C) -- -- -- -- -- -- CAC   06/29/25 2159 -- 100 92 % 17 106/68 -- -- CAC            Physical Exam  Vitals and nursing note reviewed.   Constitutional:       General: He is not in acute distress.     Comments: Disheveled, malodorous   HENT:      Head: Atraumatic.     Cardiovascular:      Rate and Rhythm: Normal rate. Rhythm irregular.   Pulmonary:      Effort: Pulmonary effort is normal. No respiratory distress.      Breath sounds: Normal breath sounds.     Neurological:      General: No focal deficit present.      Mental Status: He is alert.         Results Reviewed       None            CT head without contrast   Final Interpretation by Jesús Pang MD (06/29 2256)      No acute intracranial abnormality.                  Workstation performed: [id number]             Procedures    ED Medication and Procedure Management   Prior to Admission Medications   Prescriptions Last Dose Informant Patient Reported? Taking?   Blood Glucose Monitoring Suppl (ONE TOUCH ULTRA MINI) w/Device KIT  Self Yes No   Sig: Use as directed   Patient not taking: Reported on 6/5/2025   Blood Pressure Monitoring (Adult Blood Pressure Cuff Lg) KIT   No No   Sig: Use in the morning   Patient not taking: Reported on 6/5/2025   ONETOUCH DELICA LANCETS FINE MISC  Self Yes No   Sig: in the morning and in the evening and before bedtime. Test.   Patient not taking: Reported on 6/5/2025   Thiamine Mononitrate (VITAMIN B1) 100 mg tablet   No No   Sig: Take 1 tablet (100 mg total) by mouth in the morning.   albuterol (2.5 mg/3 mL) 0.083 % nebulizer solution   No No   Sig: Take 3 mL (2.5 mg total) by nebulization 3 (three) times a day   albuterol (PROVENTIL HFA,VENTOLIN HFA) 90 mcg/act inhaler   No No   Sig: Inhale 2 puffs  every 6 (six) hours as needed for wheezing or shortness of breath   apixaban (Eliquis) 5 mg   No No   Sig: Take 1 tablet (5 mg total) by mouth in the morning and 1 tablet (5 mg total) in the evening.   aspirin 81 mg chewable tablet   No No   Sig: Chew 1 tablet (81 mg total) in the morning.   atorvastatin (LIPITOR) 40 mg tablet   No No   Sig: Take 1 tablet (40 mg total) by mouth in the morning.   diltiazem (CARDIZEM SR) 120 mg 12 hr capsule   No No   Sig: Take 1 capsule (120 mg total) by mouth in the morning and 1 capsule (120 mg total) in the evening.   ferrous sulfate 325 (65 Fe) mg tablet   No No   Sig: Take 1 tablet (325 mg total) by mouth daily with breakfast   fluticasone-vilanterol 200-25 mcg/actuation inhaler   No No   Sig: Inhale 1 puff in the morning. Rinse mouth after use.   folic acid (FOLVITE) 400 mcg tablet   No No   Sig: Take 1 tablet (400 mcg total) by mouth in the morning.   gabapentin (NEURONTIN) 300 mg capsule   No No   Sig: Take 1 capsule (300 mg total) by mouth in the morning and 1 capsule (300 mg total) in the evening and 1 capsule (300 mg total) before bedtime.   metoprolol succinate (TOPROL-XL) 100 mg 24 hr tablet   No No   Sig: Take 1 tablet (100 mg total) by mouth daily   pantoprazole (PROTONIX) 40 mg tablet   No No   Sig: Take 1 tablet (40 mg total) by mouth in the morning.   ranolazine (RANEXA) 500 mg 12 hr tablet   No No   Sig: Take 1 tablet (500 mg total) by mouth in the morning and 1 tablet (500 mg total) in the evening.   saccharomyces boulardii (FLORASTOR) 250 mg capsule   No No   Sig: Take 1 capsule (250 mg total) by mouth in the morning and 1 capsule (250 mg total) in the evening.   tamsulosin (FLOMAX) 0.4 mg   No No   Sig: Take 1 capsule (0.4 mg total) by mouth daily with dinner   torsemide (DEMADEX) 10 mg tablet   No No   Sig: Take 1 tablet (10 mg total) by mouth every other day      Facility-Administered Medications: None     Discharge Medication List as of 6/29/2025 11:48 PM         CONTINUE these medications which have NOT CHANGED    Details   albuterol (2.5 mg/3 mL) 0.083 % nebulizer solution Take 3 mL (2.5 mg total) by nebulization 3 (three) times a day, Starting Thu 6/12/2025, Normal      albuterol (PROVENTIL HFA,VENTOLIN HFA) 90 mcg/act inhaler Inhale 2 puffs every 6 (six) hours as needed for wheezing or shortness of breath, Starting Thu 6/12/2025, Normal      apixaban (Eliquis) 5 mg Take 1 tablet (5 mg total) by mouth in the morning and 1 tablet (5 mg total) in the evening., Starting Thu 6/12/2025, Normal      aspirin 81 mg chewable tablet Chew 1 tablet (81 mg total) in the morning., Starting Thu 6/12/2025, Normal      atorvastatin (LIPITOR) 40 mg tablet Take 1 tablet (40 mg total) by mouth in the morning., Starting Th 6/12/2025, Normal      Blood Glucose Monitoring Suppl (ONE TOUCH ULTRA MINI) w/Device KIT Use as directed, Starting Thu 5/16/2019, Historical Med      Blood Pressure Monitoring (Adult Blood Pressure Cuff Lg) KIT Use in the morning, Starting Th 12/14/2023, Normal      diltiazem (CARDIZEM SR) 120 mg 12 hr capsule Take 1 capsule (120 mg total) by mouth in the morning and 1 capsule (120 mg total) in the evening., Starting Th 6/12/2025, Normal      ferrous sulfate 325 (65 Fe) mg tablet Take 1 tablet (325 mg total) by mouth daily with breakfast, Starting Thu 6/12/2025, Normal      fluticasone-vilanterol 200-25 mcg/actuation inhaler Inhale 1 puff in the morning. Rinse mouth after use., Starting Thu 6/12/2025, Normal      folic acid (FOLVITE) 400 mcg tablet Take 1 tablet (400 mcg total) by mouth in the morning., Starting Thu 6/12/2025, Normal      gabapentin (NEURONTIN) 300 mg capsule Take 1 capsule (300 mg total) by mouth in the morning and 1 capsule (300 mg total) in the evening and 1 capsule (300 mg total) before bedtime., Starting Thu 6/12/2025, Normal      metoprolol succinate (TOPROL-XL) 100 mg 24 hr tablet Take 1 tablet (100 mg total) by mouth daily, Starting Thu  6/12/2025, Normal      ONETOUCH DELICA LANCETS FINE MISC in the morning and in the evening and before bedtime. Test., Starting Thu 5/16/2019, Historical Med      pantoprazole (PROTONIX) 40 mg tablet Take 1 tablet (40 mg total) by mouth in the morning., Starting Thu 6/12/2025, Normal      ranolazine (RANEXA) 500 mg 12 hr tablet Take 1 tablet (500 mg total) by mouth in the morning and 1 tablet (500 mg total) in the evening., Starting Thu 6/12/2025, Normal      saccharomyces boulardii (FLORASTOR) 250 mg capsule Take 1 capsule (250 mg total) by mouth in the morning and 1 capsule (250 mg total) in the evening., Starting Thu 6/12/2025, Normal      tamsulosin (FLOMAX) 0.4 mg Take 1 capsule (0.4 mg total) by mouth daily with dinner, Starting Thu 6/12/2025, Normal      Thiamine Mononitrate (VITAMIN B1) 100 mg tablet Take 1 tablet (100 mg total) by mouth in the morning., Starting Thu 6/12/2025, No Print      torsemide (DEMADEX) 10 mg tablet Take 1 tablet (10 mg total) by mouth every other day, Starting Thu 6/12/2025, Until Sat 7/12/2025, Normal           No discharge procedures on file.  ED SEPSIS DOCUMENTATION   Time reflects when diagnosis was documented in both MDM as applicable and the Disposition within this note       Time User Action Codes Description Comment    6/29/2025 11:15 PM Quinn Krishna [W19.XXXA] Fall, initial encounter     6/29/2025 11:15 PM Quinn Krishna [S09.90XA] Injury of head, initial encounter     6/29/2025 11:15 PM Quinn Krishna [F10.929] Alcohol intoxication (HCC)                      [1]   Past Medical History:  Diagnosis Date    Acute encephalopathy 06/07/2025    Acute on chronic kidney failure  (HCC)     Alcohol abuse     Alcohol withdrawal (HCC) 10/09/2018    Atrial fibrillation (HCC)     Cancer (HCC)     prostate ca,had radiation    Cardiac disease     stents,then triple bypass    COPD (chronic obstructive pulmonary disease) (HCC)     Coronary artery disease     Elevated troponin  "09/28/2023    ETOH abuse     Heart failure (HCC)     History of heart surgery     says [facility] bypass [facility]    Hx of heart artery stent     2014    Hyperlipidemia     Hypertension     Hypoglycemia 06/05/2025    Hyponatremia 10/17/2019    Hypovolemic shock ([facility]) 12/22/2019    Increased anion gap 04/21/2021    Lumbar spondylitis ([facility]) 10/13/2022    Metabolic acidosis, increased anion gap 04/21/2021    Nasal bone fracture 10/10/2022    Prostate CA ([facility])     S/P CABG x 3     2004    Sleep apnea    [2]   Past Surgical History:  Procedure Laterality Date    CARDIAC CATHETERIZATION      2 stents    CORONARY ARTERY BYPASS GRAFT      CORONARY ARTERY BYPASS GRAFT  2004    IA ARTHRD ANT INTERBODY MIN DSC CRV BELOW C2 N/A 12/16/2020    Procedure: Anterior cervical discectomy with fusion C4-C7; Posterior cervical decompression and fusion C2-T2;  Surgeon: David Rowell MD;  Location: [facility];  Service: Neurosurgery    TONSILLECTOMY     [3]   Family History  Problem Relation Name Age of Onset    Diabetes Mother      Uterine cancer Mother      COPD Father      Hypertension Father     [4]   Social History  Tobacco Use    Smoking status: Every Day     Current packs/day: 1.50     Average packs/day: 1.5 packs/day for 40.0 years (60.0 ttl pk-yrs)     Types: Cigarettes    Smokeless tobacco: Never   Vaping Use    Vaping status: Never Used   Substance Use Topics    Alcohol use: Yes     Alcohol/week: 4.0 standard drinks of alcohol     Types: 4 Standard drinks or equivalent per week     Comment: \"quart a day\"    Drug use: No        Quinn Krishna DO  06/30/25 0324    "

## 2025-06-30 NOTE — ASSESSMENT & PLAN NOTE
Initiated prophylactic oral Vancomycin, while on acute antibiotics (should continue for 48-72 hrs post final dose)

## 2025-06-30 NOTE — ASSESSMENT & PLAN NOTE
Continue IC/LABA maintenance regimen - PRN Xopenex nebulization on board  Maintain oxygenation as necessary

## 2025-06-30 NOTE — SEPSIS NOTE
Sepsis Note   Gregg Partida 65 y.o. male MRN: [id number]  Unit/Bed#: 2 [place] [address] Encounter: [id number]       Initial Sepsis Screening       Row Name 06/30/25 1932                Is the patient's history suggestive of a new or worsening infection? No  -                  User Key  (r) = Recorded By, (t) = Taken By, (c) = Cosigned By      Initials Name Provider Type    HEMANTH North DO Physician                   Initial Sepsis Screening       Row Name 06/30/25 1932                   Initial Sepsis Assessment    Is the patient's history suggestive of a new or worsening infection? No  -                  User Key  (r) = Recorded By, (t) = Taken By, (c) = Cosigned By      Initials Name Provider Type    HEMANTH North DO Physician                         There is no height or weight on file to calculate BMI.  Wt Readings from Last 1 Encounters:   06/27/25 79.4 kg (175 lb)        Ideal body weight: 82.2 kg (181 lb 3.5 oz)

## 2025-06-30 NOTE — H&P
H&P - Hospitalist   Name: Gregg Partida 65 y.o. male I MRN: [id number]  Unit/Bed#: 2 [address] Date of Admission: 6/30/2025   Date of Service: 6/30/2025 I Hospital Day: 0     Assessment & Plan  Acute respiratory failure with hypoxia (HCC)  Occasionally recurrent episode due to chronic alcoholism and suspected aspiration episodes (see below)  Baseline oxygenation at room air -> currently requiring up to 4 L via nasal cannula -> wean down to baseline, as tolerated  CXR, although pending official report, on personal review with a degree of right-sided haziness  On respiratory and airway clearance protocols  SIRS (systemic inflammatory response syndrome) (HCC)  On presentation with tachycardia/tachypnea and leukopenia (although decreased WBC count is more long-term)  Likely reactive due to aspiration episode (see below)  Unclear whether true infectious etiology -> pending procalcitonin and blood cultures  Monitor vitals and maintain hemodynamics  Suspected aspiration episode  In the setting of alcohol intoxication  Met SIRS criteria (as above)  Can consider aspiration pneumonitis vs pneumonia ??  Initiated on IV Rocephin in the ED -> will continue, for now, pending infectious workup  Await speech therapy evaluation once sober  Aspiration precautions  Check urine Legionella/Streptococcus Ag - check MRSA swab and sputum cultures (if produced)  Maintain supplemental oxygenation as necessary - PRN Robitussin on board  Respiratory and airway clearance protocols  Pancytopenia (HCC)  Likely sequela of chronic alcoholism  Monitor CBC, and transfuse if hemoglobin/platelet thresholds are met  Alcohol intoxication (HCC)  Chronic/ongoing issue  CIWA protocol  Continue supplemental thiamine/folic acid and multivitamin  Frequent flyer in the ED due to recurrent episodes of intoxication  History of Clostridium difficile infection  Initiated prophylactic oral Vancomycin, while on acute antibiotics (should continue for 48-72 hrs  post final dose)  Elevated alkaline phosphatase  In the setting of chronic alcoholism with  Limit/avoid hepatotoxins as possible  CAD (coronary artery disease)  Status post prior CABG  Continue ASA/statin/Lopressor  Atrial fibrillation (HCC)  With transient RVR on presentation, now improved   Continue Cardizem/Toprol XL for rate control  On Eliquis for secondary stroke prevention  COPD (chronic obstructive pulmonary disease) (HCC)  Continue IC/LABA maintenance regimen - PRN Xopenex nebulization on board  Maintain oxygenation as necessary  Essential hypertension  Continue Toprol-XL/Cardizem  Low-sodium diet  PRN IV Hydralazine on board for BP spikes      VTE Pharmacologic Prophylaxis: VTE Score: 4 Moderate Risk (Score 3-4) - Pharmacological DVT Prophylaxis Ordered: Apixaban (Eliquis).    Code Status: Level 1 - Full Code    Discussion with:  Patient at bedside    Anticipated Length of Stay:  Patient will be admitted on an Inpatient basis with an anticipated length of stay of greater than 2 midnights.   Justification for Hospital Stay: Shortness of breath/hypoxia in the setting of chronic alcoholism and possible aspiration, requiring hemodynamic monitoring, infectious workup, and supplemental oxygen maintenance.    Total Time for Visit (including Counseling & Coordination of Care):  78 minutes. More than 50% of total time spent on counseling and coordination of care, on one or more of the following: performing physical exam; counseling and coordination of care; obtaining or reviewing history; documenting in the medical record; reviewing/ordering tests, medications or procedures; communicating with other healthcare professionals and discussing with patient's family/caregivers.      History of Present Illness    Chief Complaint: Shortness of breath    Gregg Partida is a 65 y.o. male who presents with complaints of shortness of breath and weakness, who is a frequent flyer in the ER for alcohol intoxication.  Initial  plan was to observe in the ER, as usual, however due to increased oxygen requirements, up to 4 L with a home baseline room air, and complaints of shortness of breath and coughing.  He has been admitted.  In the ED, he did meet SIRS criteria with questionable right-sided infiltrate on CXR imaging.  At the time my encounter, he is sluggish due to alcohol intoxication.  As such, he is a poor historian at this time.    Review of Systems:  A thorough 12 point review systems was conducted.  Pertinent positives and negatives are mentioned in the history of present illness.      Past Medical & Surgical History    Past Medical History[1]    Past Surgical History[2]      Medications:   Prior to Admission medications    Medication Sig Start Date End Date Taking? Authorizing Provider   albuterol (2.5 mg/3 mL) 0.083 % nebulizer solution Take 3 mL (2.5 mg total) by nebulization 3 (three) times a day 6/12/25   Mary Ann Stockton, DO   albuterol (PROVENTIL HFA,VENTOLIN HFA) 90 mcg/act inhaler Inhale 2 puffs every 6 (six) hours as needed for wheezing or shortness of breath 6/12/25   Mary Ann Stockton, DO   apixaban (Eliquis) 5 mg Take 1 tablet (5 mg total) by mouth in the morning and 1 tablet (5 mg total) in the evening. 6/12/25   Mary Ann Stockton, DO   aspirin 81 mg chewable tablet Chew 1 tablet (81 mg total) in the morning. 6/12/25   Mary Ann Stockton, DO   atorvastatin (LIPITOR) 40 mg tablet Take 1 tablet (40 mg total) by mouth in the morning. 6/12/25   Mary Ann Stockton, DO   Blood Glucose Monitoring Suppl (ONE TOUCH ULTRA MINI) w/Device KIT Use as directed  Patient not taking: Reported on 6/5/2025 5/16/19   Historical Provider, MD   Blood Pressure Monitoring (Adult Blood Pressure Cuff Lg) KIT Use in the morning  Patient not taking: Reported on 6/5/2025 12/14/23   Elliott Zimmerman MD   diltiazem (CARDIZEM SR) 120 mg 12 hr capsule Take 1 capsule (120 mg total) by mouth in the morning and 1 capsule (120 mg total) in the evening.  6/12/25   Mary Ann Stockton DO   ferrous sulfate 325 (65 Fe) mg tablet Take 1 tablet (325 mg total) by mouth daily with breakfast 6/12/25   Mary Ann Stocktno DO   fluticasone-vilanterol 200-25 mcg/actuation inhaler Inhale 1 puff in the morning. Rinse mouth after use. 6/12/25   Mary Ann Stockton DO   folic acid (FOLVITE) 400 mcg tablet Take 1 tablet (400 mcg total) by mouth in the morning. 6/12/25   Mary Ann Stockton DO   gabapentin (NEURONTIN) 300 mg capsule Take 1 capsule (300 mg total) by mouth in the morning and 1 capsule (300 mg total) in the evening and 1 capsule (300 mg total) before bedtime. 6/12/25   Mary Ann Stockton DO   metoprolol succinate (TOPROL-XL) 100 mg 24 hr tablet Take 1 tablet (100 mg total) by mouth daily 6/12/25   Mary Ann Stockton DO   [facility] LANCETS FINE MISC in the morning and in the evening and before bedtime. Test.  Patient not taking: Reported on 6/5/2025 5/16/19   Historical Provider, MD   pantoprazole (PROTONIX) 40 mg tablet Take 1 tablet (40 mg total) by mouth in the morning. 6/12/25   Mary Ann Stockton DO   ranolazine (RANEXA) 500 mg 12 hr tablet Take 1 tablet (500 mg total) by mouth in the morning and 1 tablet (500 mg total) in the evening. 6/12/25   Mary Ann Stockton DO   saccharomyces boulardii (FLORASTOR) 250 mg capsule Take 1 capsule (250 mg total) by mouth in the morning and 1 capsule (250 mg total) in the evening. 6/12/25   Mary Ann Stockton DO   tamsulosin (FLOMAX) 0.4 mg Take 1 capsule (0.4 mg total) by mouth daily with dinner 6/12/25   Mary Ann Stockton DO   Thiamine Mononitrate (VITAMIN B1) 100 mg tablet Take 1 tablet (100 mg total) by mouth in the morning. 6/12/25   Mary Ann Stockton DO   torsemide (DEMADEX) 10 mg tablet Take 1 tablet (10 mg total) by mouth every other day 6/12/25 7/12/25  Mary Ann Stockton DO       Allergies: Allergies[3]      Social & Family History    Social History     Substance and Sexual Activity   Alcohol Use Yes    Alcohol/week: 4.0  "standard drinks of alcohol    Types: 4 Standard drinks or equivalent per week    Comment: \"quart a day\"     Tobacco Use History[4]  Social History     Substance and Sexual Activity   Drug Use No       Family History[5]      Objective     Vitals:   Blood Pressure: (!) 85/49 (06/30/25 1826)  Pulse: 100 (06/30/25 1826)  Temperature: 98.2 °F (36.8 °C) (06/30/25 1812)  Temp Source: Oral (06/30/25 1812)  Respirations: 20 (06/30/25 1812)  SpO2: 97 % (06/30/25 1826)      Physical Exam:    GENERAL Weak/fatigued   HEAD   Normocephalic - atraumatic   EYES Nonicteric   MOUTH   Mucosa moist   NECK   Supple - full range of motion   CARDIAC Intermittent tachycardic   PULMONARY Diminished bilaterally more prominent on the right   ABDOMEN Current nontender/nondistended   MUSCULOSKELETAL   Motor strength/range of motion deconditioned   NEUROLOGIC Sluggish   PSYCHIATRIC   Mood/affect flat         Labs & Recent Cultures:  Results from last 7 days   Lab Units 06/30/25  1458   WBC Thousand/uL 3.25*   HEMOGLOBIN g/dL 10.4*   HEMATOCRIT % 32.2*   PLATELETS Thousands/uL 57*   SEGS PCT % 58   LYMPHO PCT % 31   MONO PCT % 8   EOS PCT % 1     Results from last 7 days   Lab Units 06/30/25  1458   SODIUM mmol/L 144   POTASSIUM mmol/L 3.5   CHLORIDE mmol/L 104   CO2 mmol/L 25   BUN mg/dL 14   CREATININE mg/dL 1.10   ANION GAP mmol/L 15*   CALCIUM mg/dL 7.5*   ALBUMIN g/dL 3.3*   TOTAL BILIRUBIN mg/dL 0.73   ALK PHOS U/L 149*   ALT U/L 29   AST U/L 75*   GLUCOSE RANDOM mg/dL 77     Results from last 7 days   Lab Units 06/30/25  1725   INR  1.05             Results from last 7 days   Lab Units 06/30/25  1725   LACTIC ACID mmol/L 1.3   PROCALCITONIN ng/ml 0.09                 Lines/Drains:  Invasive Devices       Peripheral Intravenous Line  Duration             Peripheral IV 06/30/25 Distal;Dorsal (posterior);Left Forearm <1 day                      Imaging:     CT head without contrast  Result Date: 6/30/2025  Narrative: CT BRAIN - WITHOUT " CONTRAST CT CERVICAL SPINE - WITHOUT CONTRAST INDICATION:   EtOH. COMPARISON: CT head and cervical spine 6/22/2025 TECHNIQUE:  CT examination of the brain was performed.  Multiplanar 2D reformatted images were created from the source data. CT examination of the cervical spine was performed without intravenous contrast.  Contiguous axial images were obtained. Multiplanar 2D reformatted images were created from the source data. Radiation dose length product (DLP) for this visit:  488 mGy-cm. .  This examination, like all CT scans performed in the [facility], was performed utilizing techniques to minimize radiation dose exposure, including the use of iterative reconstruction and automated exposure control. IMAGE QUALITY:  Diagnostic. BRAIN FINDINGS: PARENCHYMA: No hemorrhage. No hydrocephalus. No extra-axial collection. No mass effect. No mass lesion. No CT evidence of an acute cortical infarct. Background mild chronic microvascular ischemic change. Prominent extra-axial space along the right cerebral convexity is unchanged from prior exam. VENTRICLES AND EXTRA-AXIAL SPACES:  Normal for the patient's age. VISUALIZED ORBITS: Orbits are unremarkable. PARANASAL SINUSES: No middle ear or mastoid effusion. Paranasal sinuses are notable for mucosal thickening in the left greater than right maxillary sinus CALVARIUM AND EXTRACRANIAL SOFT TISSUES: Mild subcu swelling along the midline posterior scalp. CERVICAL SPINE FINDINGS: ALIGNMENT: Straightened normal cervical lordosis. Trace anterolisthesis of C7 on T1. VERTEBRAE: No acute fracture. There are extensive postsurgical changes from C4-C7 ACDF and partially imaged thoracic posterior spinal fusion hardware. There is been posterior decompression at C3-C7. DEGENERATIVE CHANGES: No CT evidence of high-grade spinal canal stenosis. PREVERTEBRAL AND PARASPINAL SOFT TISSUES: Redemonstrated is a posterior paraspinal fluid collection measuring approximately 3.7 x 2.3  cm spanning the surgical levels. THORACIC INLET:  Normal.     Impression: 1.  No acute intracranial abnormality. 2.  No acute cervical spine fracture or traumatic malalignment. 3.  Extensive postsurgical changes in the cervical spine with a posterior paraspinal fluid collection spanning the surgical levels.   This is unchanged from prior exam. Workstation performed: [id number]       CT spine cervical without contrast  Result Date: 6/30/2025  Narrative: CT BRAIN - WITHOUT CONTRAST CT CERVICAL SPINE - WITHOUT CONTRAST INDICATION:   EtOH. COMPARISON: CT head and cervical spine 6/22/2025 TECHNIQUE:  CT examination of the brain was performed.  Multiplanar 2D reformatted images were created from the source data. CT examination of the cervical spine was performed without intravenous contrast.  Contiguous axial images were obtained. Multiplanar 2D reformatted images were created from the source data. Radiation dose length product (DLP) for this visit:  488 mGy-cm. .  This examination, like all CT scans performed in the [facility], was performed utilizing techniques to minimize radiation dose exposure, including the use of iterative reconstruction and automated exposure control. IMAGE QUALITY:  Diagnostic. BRAIN FINDINGS: PARENCHYMA: No hemorrhage. No hydrocephalus. No extra-axial collection. No mass effect. No mass lesion. No CT evidence of an acute cortical infarct. Background mild chronic microvascular ischemic change. Prominent extra-axial space along the right cerebral convexity is unchanged from prior exam. VENTRICLES AND EXTRA-AXIAL SPACES:  Normal for the patient's age. VISUALIZED ORBITS: Orbits are unremarkable. PARANASAL SINUSES: No middle ear or mastoid effusion. Paranasal sinuses are notable for mucosal thickening in the left greater than right maxillary sinus CALVARIUM AND EXTRACRANIAL SOFT TISSUES: Mild subcu swelling along the midline posterior scalp. CERVICAL SPINE FINDINGS: ALIGNMENT:  Straightened normal cervical lordosis. Trace anterolisthesis of C7 on T1. VERTEBRAE: No acute fracture. There are extensive postsurgical changes from C4-C7 ACDF and partially imaged thoracic posterior spinal fusion hardware. There is been posterior decompression at C3-C7. DEGENERATIVE CHANGES: No CT evidence of high-grade spinal canal stenosis. PREVERTEBRAL AND PARASPINAL SOFT TISSUES: Redemonstrated is a posterior paraspinal fluid collection measuring approximately 3.7 x 2.3 cm spanning the surgical levels. THORACIC INLET:  Normal.     Impression: 1.  No acute intracranial abnormality. 2.  No acute cervical spine fracture or traumatic malalignment. 3.  Extensive postsurgical changes in the cervical spine with a posterior paraspinal fluid collection spanning the surgical levels.   This is unchanged from prior exam. Workstation performed: [id number]                         BRAD JOHNSON MD   Hospitalist - [facility]        ** Please Note:  Documentation is constructed using a voice recognition dictation system.  An occasional wrong word/phrase or “sound-a-like” substitution may have been picked up by dictation device due to the inherent limitations of voice recognition software.  Read the chart carefully and recognize, using reasonable context, where substitutions may have occurred.**        [1]   Past Medical History:  Diagnosis Date    Acute encephalopathy 06/07/2025    Acute on chronic kidney failure  (HCC)     Alcohol abuse     Alcohol withdrawal (HCC) 10/09/2018    Atrial fibrillation (HCC)     Cancer (HCC)     prostate ca,had radiation    Cardiac disease     stents,then triple bypass    COPD (chronic obstructive pulmonary disease) (HCC)     Coronary artery disease     Elevated troponin 09/28/2023    ETOH abuse     Heart failure (HCC)     History of heart surgery     says triple bypass [facility]    Hx of heart artery stent     2014    Hyperlipidemia     Hypertension     Hypoglycemia  06/05/2025    Hyponatremia 10/17/2019    Hypovolemic shock (HCC) 12/22/2019    Increased anion gap 04/21/2021    Lumbar spondylitis (HCC) 10/13/2022    Metabolic acidosis, increased anion gap 04/21/2021    Nasal bone fracture 10/10/2022    Prostate CA (HCC)     S/P CABG x 3     2004    Sleep apnea    [2]   Past Surgical History:  Procedure Laterality Date    CARDIAC CATHETERIZATION      2 stents    CORONARY ARTERY BYPASS GRAFT      CORONARY ARTERY BYPASS GRAFT  2004    UT ARTHRD ANT INTERBODY MIN DSC CRV BELOW C2 N/A 12/16/2020    Procedure: Anterior cervical discectomy with fusion C4-C7; Posterior cervical decompression and fusion C2-T2;  Surgeon: David Rowell MD;  Location: [facility];  Service: Neurosurgery    TONSILLECTOMY     [3] No Known Allergies  [4]   Social History  Tobacco Use   Smoking Status Every Day    Current packs/day: 1.50    Average packs/day: 1.5 packs/day for 40.0 years (60.0 ttl pk-yrs)    Types: Cigarettes   Smokeless Tobacco Never   [5]   Family History  Problem Relation Name Age of Onset    Diabetes Mother      Uterine cancer Mother      COPD Father      Hypertension Father

## 2025-07-01 LAB
ALBUMIN SERPL BCG-MCNC: 3.2 G/DL (ref 3.5–5)
ALP SERPL-CCNC: 146 U/L (ref 34–104)
ALT SERPL W P-5'-P-CCNC: 23 U/L (ref 7–52)
ANION GAP SERPL CALCULATED.3IONS-SCNC: 12 MMOL/L (ref 4–13)
AST SERPL W P-5'-P-CCNC: 52 U/L (ref 13–39)
BASOPHILS # BLD AUTO: 0.04 THOUSANDS/ÂΜL (ref 0–0.1)
BASOPHILS NFR BLD AUTO: 1 % (ref 0–1)
BILIRUB SERPL-MCNC: 0.71 MG/DL (ref 0.2–1)
BUN SERPL-MCNC: 15 MG/DL (ref 5–25)
CALCIUM ALBUM COR SERPL-MCNC: 7.7 MG/DL (ref 8.3–10.1)
CALCIUM SERPL-MCNC: 7.1 MG/DL (ref 8.4–10.2)
CHLORIDE SERPL-SCNC: 100 MMOL/L (ref 96–108)
CO2 SERPL-SCNC: 28 MMOL/L (ref 21–32)
CREAT SERPL-MCNC: 1.02 MG/DL (ref 0.6–1.3)
EOSINOPHIL # BLD AUTO: 0.08 THOUSAND/ÂΜL (ref 0–0.61)
EOSINOPHIL NFR BLD AUTO: 3 % (ref 0–6)
ERYTHROCYTE [DISTWIDTH] IN BLOOD BY AUTOMATED COUNT: 18.3 % (ref 11.6–15.1)
GFR SERPL CREATININE-BSD FRML MDRD: 77 ML/MIN/1.73SQ M
GLUCOSE SERPL-MCNC: 70 MG/DL (ref 65–140)
HCT VFR BLD AUTO: 31.4 % (ref 36.5–49.3)
HGB BLD-MCNC: 9.9 G/DL (ref 12–17)
IMM GRANULOCYTES # BLD AUTO: 0.01 THOUSAND/UL (ref 0–0.2)
IMM GRANULOCYTES NFR BLD AUTO: 0 % (ref 0–2)
LYMPHOCYTES # BLD AUTO: 1.12 THOUSANDS/ÂΜL (ref 0.6–4.47)
LYMPHOCYTES NFR BLD AUTO: 35 % (ref 14–44)
MAGNESIUM SERPL-MCNC: 1.3 MG/DL (ref 1.9–2.7)
MCH RBC QN AUTO: 30.7 PG (ref 26.8–34.3)
MCHC RBC AUTO-ENTMCNC: 31.5 G/DL (ref 31.4–37.4)
MCV RBC AUTO: 97 FL (ref 82–98)
MONOCYTES # BLD AUTO: 0.36 THOUSAND/ÂΜL (ref 0.17–1.22)
MONOCYTES NFR BLD AUTO: 11 % (ref 4–12)
NEUTROPHILS # BLD AUTO: 1.59 THOUSANDS/ÂΜL (ref 1.85–7.62)
NEUTS SEG NFR BLD AUTO: 50 % (ref 43–75)
NRBC BLD AUTO-RTO: 0 /100 WBCS
PHOSPHATE SERPL-MCNC: 3.5 MG/DL (ref 2.3–4.1)
PLATELET # BLD AUTO: 47 THOUSANDS/UL (ref 149–390)
PMV BLD AUTO: 11.3 FL (ref 8.9–12.7)
POTASSIUM SERPL-SCNC: 3.4 MMOL/L (ref 3.5–5.3)
PROCALCITONIN SERPL-MCNC: 0.07 NG/ML
PROT SERPL-MCNC: 5.9 G/DL (ref 6.4–8.4)
QRS AXIS: 71 DEGREES
QRS AXIS: 82 DEGREES
QRSD INTERVAL: 94 MS
QRSD INTERVAL: 96 MS
QT INTERVAL: 322 MS
QT INTERVAL: 332 MS
QTC INTERVAL: 489 MS
QTC INTERVAL: 497 MS
RBC # BLD AUTO: 3.23 MILLION/UL (ref 3.88–5.62)
SODIUM SERPL-SCNC: 140 MMOL/L (ref 135–147)
T WAVE AXIS: -46 DEGREES
T WAVE AXIS: -46 DEGREES
VENTRICULAR RATE: 130 BPM
VENTRICULAR RATE: 143 BPM
WBC # BLD AUTO: 3.2 THOUSAND/UL (ref 4.31–10.16)

## 2025-07-01 PROCEDURE — 85025 COMPLETE CBC W/AUTO DIFF WBC: CPT | Performed by: INTERNAL MEDICINE

## 2025-07-01 PROCEDURE — 80053 COMPREHEN METABOLIC PANEL: CPT | Performed by: INTERNAL MEDICINE

## 2025-07-01 PROCEDURE — 83735 ASSAY OF MAGNESIUM: CPT | Performed by: INTERNAL MEDICINE

## 2025-07-01 PROCEDURE — 84145 PROCALCITONIN (PCT): CPT | Performed by: INTERNAL MEDICINE

## 2025-07-01 PROCEDURE — 84100 ASSAY OF PHOSPHORUS: CPT | Performed by: INTERNAL MEDICINE

## 2025-07-01 PROCEDURE — 93010 ELECTROCARDIOGRAM REPORT: CPT | Performed by: INTERNAL MEDICINE

## 2025-07-01 PROCEDURE — 99232 SBSQ HOSP IP/OBS MODERATE 35: CPT

## 2025-07-01 PROCEDURE — 92610 EVALUATE SWALLOWING FUNCTION: CPT

## 2025-07-01 PROCEDURE — 97167 OT EVAL HIGH COMPLEX 60 MIN: CPT

## 2025-07-01 PROCEDURE — 93005 ELECTROCARDIOGRAM TRACING: CPT

## 2025-07-01 RX ORDER — POTASSIUM CHLORIDE 1500 MG/1
40 TABLET, EXTENDED RELEASE ORAL ONCE
Status: COMPLETED | OUTPATIENT
Start: 2025-07-01 | End: 2025-07-01

## 2025-07-01 RX ORDER — MAGNESIUM SULFATE HEPTAHYDRATE 40 MG/ML
4 INJECTION, SOLUTION INTRAVENOUS ONCE
Status: COMPLETED | OUTPATIENT
Start: 2025-07-01 | End: 2025-07-01

## 2025-07-01 RX ORDER — LORAZEPAM 1 MG/1
2 TABLET ORAL ONCE
Status: COMPLETED | OUTPATIENT
Start: 2025-07-01 | End: 2025-07-01

## 2025-07-01 RX ORDER — METOPROLOL TARTRATE 1 MG/ML
5 INJECTION, SOLUTION INTRAVENOUS ONCE
Status: COMPLETED | OUTPATIENT
Start: 2025-07-01 | End: 2025-07-01

## 2025-07-01 RX ORDER — FUROSEMIDE 10 MG/ML
20 INJECTION INTRAMUSCULAR; INTRAVENOUS ONCE
Status: COMPLETED | OUTPATIENT
Start: 2025-07-01 | End: 2025-07-01

## 2025-07-01 RX ADMIN — GABAPENTIN 300 MG: 300 CAPSULE ORAL at 09:18

## 2025-07-01 RX ADMIN — ASPIRIN 81 MG: 81 TABLET, CHEWABLE ORAL at 09:18

## 2025-07-01 RX ADMIN — TAMSULOSIN HYDROCHLORIDE 0.4 MG: 0.4 CAPSULE ORAL at 17:14

## 2025-07-01 RX ADMIN — ACETAMINOPHEN 650 MG: 325 TABLET, FILM COATED ORAL at 21:02

## 2025-07-01 RX ADMIN — PANTOPRAZOLE SODIUM 40 MG: 40 TABLET, DELAYED RELEASE ORAL at 09:18

## 2025-07-01 RX ADMIN — MAGNESIUM SULFATE HEPTAHYDRATE 4 G: 40 INJECTION, SOLUTION INTRAVENOUS at 09:18

## 2025-07-01 RX ADMIN — ATORVASTATIN CALCIUM 40 MG: 40 TABLET, FILM COATED ORAL at 17:14

## 2025-07-01 RX ADMIN — METOROPROLOL TARTRATE 5 MG: 5 INJECTION, SOLUTION INTRAVENOUS at 10:11

## 2025-07-01 RX ADMIN — DILTIAZEM HYDROCHLORIDE 120 MG: 60 CAPSULE, EXTENDED RELEASE ORAL at 09:20

## 2025-07-01 RX ADMIN — GABAPENTIN 300 MG: 300 CAPSULE ORAL at 21:02

## 2025-07-01 RX ADMIN — FLUTICASONE FUROATE AND VILANTEROL TRIFENATATE 1 PUFF: 200; 25 POWDER RESPIRATORY (INHALATION) at 09:33

## 2025-07-01 RX ADMIN — POTASSIUM CHLORIDE 40 MEQ: 1500 TABLET, EXTENDED RELEASE ORAL at 09:17

## 2025-07-01 RX ADMIN — FERROUS SULFATE TAB 325 MG (65 MG ELEMENTAL FE) 325 MG: 325 (65 FE) TAB at 09:18

## 2025-07-01 RX ADMIN — Medication 250 MG: at 17:19

## 2025-07-01 RX ADMIN — APIXABAN 5 MG: 5 TABLET, FILM COATED ORAL at 09:17

## 2025-07-01 RX ADMIN — Medication 125 MG: at 09:23

## 2025-07-01 RX ADMIN — APIXABAN 5 MG: 5 TABLET, FILM COATED ORAL at 17:36

## 2025-07-01 RX ADMIN — CEFTRIAXONE 1000 MG: 1 INJECTION, SOLUTION INTRAVENOUS at 18:39

## 2025-07-01 RX ADMIN — LORAZEPAM 2 MG: 1 TABLET ORAL at 10:11

## 2025-07-01 RX ADMIN — METOPROLOL SUCCINATE 100 MG: 50 TABLET, EXTENDED RELEASE ORAL at 09:18

## 2025-07-01 RX ADMIN — Medication 125 MG: at 21:01

## 2025-07-01 RX ADMIN — THIAMINE HCL TAB 100 MG 100 MG: 100 TAB at 09:17

## 2025-07-01 RX ADMIN — B-COMPLEX W/ C & FOLIC ACID TAB 1 TABLET: TAB at 09:18

## 2025-07-01 RX ADMIN — RANOLAZINE 500 MG: 500 TABLET, FILM COATED, EXTENDED RELEASE ORAL at 17:22

## 2025-07-01 RX ADMIN — GABAPENTIN 300 MG: 300 CAPSULE ORAL at 17:14

## 2025-07-01 RX ADMIN — FUROSEMIDE 20 MG: 10 INJECTION, SOLUTION INTRAMUSCULAR; INTRAVENOUS at 13:17

## 2025-07-01 RX ADMIN — FOLIC ACID 1 MG: 1 TABLET ORAL at 09:18

## 2025-07-01 RX ADMIN — Medication 250 MG: at 09:17

## 2025-07-01 RX ADMIN — RANOLAZINE 500 MG: 500 TABLET, FILM COATED, EXTENDED RELEASE ORAL at 09:18

## 2025-07-01 NOTE — PLAN OF CARE
Problem: Potential for Falls  Goal: Patient will remain free of falls  Description: INTERVENTIONS:  - Educate patient/family on patient safety including physical limitations  - Instruct patient to call for assistance with activity   - Consider consulting OT/PT to assist with strengthening/mobility based on AM PAC & JH-HLM score  - Consult OT/PT to assist with strengthening/mobility   - Keep Call bell within reach  - Keep bed low and locked with side rails adjusted as appropriate  - Keep care items and personal belongings within reach  - Initiate and maintain comfort rounds  - Make Fall Risk Sign visible to staff  - Initiate/Maintain bed alarm  - Apply yellow socks and bracelet for high fall risk patients  - Consider moving patient to room near nurses station  Outcome: Progressing     Problem: RESPIRATORY - ADULT  Goal: Achieves optimal ventilation and oxygenation  Description: INTERVENTIONS:  - Assess for changes in respiratory status  - Assess for changes in mentation and behavior  - Position to facilitate oxygenation and minimize respiratory effort  - Oxygen administered by appropriate delivery if ordered  - Initiate smoking cessation education as indicated  - Encourage broncho-pulmonary hygiene including cough, deep breathe, Incentive Spirometry  - Assess the need for suctioning and aspirate as needed  - Assess and instruct to report SOB or any respiratory difficulty  - Respiratory Therapy support as indicated  Outcome: Progressing     Problem: Nutrition/Hydration-ADULT  Goal: Nutrient/Hydration intake appropriate for improving, restoring or maintaining nutritional needs  Description: Monitor and assess patient's nutrition/hydration status for malnutrition. Collaborate with interdisciplinary team and initiate plan and interventions as ordered.  Monitor patient's weight and dietary intake as ordered or per policy. Utilize nutrition screening tool and intervene as necessary. Determine patient's food preferences  and provide high-protein, high-caloric foods as appropriate.     INTERVENTIONS:  - Monitor oral intake, urinary output, labs, and treatment plans  - Assess nutrition and hydration status and recommend course of action  - Evaluate amount of meals eaten  - Assist patient with eating if necessary   - Allow adequate time for meals  - Recommend/ encourage appropriate diets, oral nutritional supplements, and vitamin/mineral supplements  - Order, calculate, and assess calorie counts as needed  - Recommend, monitor, and adjust tube feedings and TPN/PPN based on assessed needs  - Assess need for intravenous fluids  - Provide specific nutrition/hydration education as appropriate  - Include patient/family/caregiver in decisions related to nutrition  Outcome: Progressing

## 2025-07-01 NOTE — PHYSICAL THERAPY NOTE
PT Cancellation Note       07/01/25 1024   Note Type   Note type Cancelled Session   Cancel Reasons Refusal   Additional Comments Attempted to see pt this AM for PT evaluation. Per discussion with CHRISTIE Mcfarland pt not appropriate for OOB at this time, HR currently in the 180's bpm. Will follow-up as appropriate.   Licensure   [place] License Number  Ursula Anne [id number]

## 2025-07-01 NOTE — ASSESSMENT & PLAN NOTE
Patient with history of alcoholism and frequent ED visits presented to ED due to weakness, noted to be hypoxic in ED requiring 4 L nc.  CXR: Mild central pulmonary vascular congestion. Left pleural fluid. Left seventh rib fracture. No visible pneumothorax.  Concern for recurrent aspiration given chronic alcoholism  Procalcitonin negative x 2, afebrile, mild leukopenia  Started on ceftriaxone current day #2, continue for now pending blood cultures  Follow up urine antigens and sputum culture if able  Speech consulted  Trial dose of IV lasix 20 mg x 1  Xopenex nebs PRN  Currently stable on 2 L nc, continue to wean as tolerated

## 2025-07-01 NOTE — PROGRESS NOTES
Progress Note - Hospitalist   Name: Gregg Partida 65 y.o. male I MRN: [id number]  Unit/Bed#: 2 [address] Date of Admission: 6/30/2025   Date of Service: 7/1/2025 I Hospital Day: 1    Assessment & Plan  Acute respiratory failure with hypoxia (HCC)  Patient with history of alcoholism and frequent ED visits presented to ED due to weakness, noted to be hypoxic in ED requiring 4 L nc.  CXR: Mild central pulmonary vascular congestion. Left pleural fluid. Left seventh rib fracture. No visible pneumothorax.  Concern for recurrent aspiration given chronic alcoholism  Procalcitonin negative x 2, afebrile, mild leukopenia  Started on ceftriaxone current day #2, continue for now pending blood cultures  Follow up urine antigens and sputum culture if able  Speech consulted  Trial dose of IV lasix 20 mg x 1  Xopenex nebs PRN  Currently stable on 2 L nc, continue to wean as tolerated  Atrial fibrillation (HCC)  A fib with RVR on presentation, not compliant with home medications  Resume home Cardizem 120 mg BID and Toprol  mg daily  Continue Eliquis  HR elevated this morning up to 170s, improved after home medications and 5 mg IV lopressor x 1  Monitor on telemetry  SIRS (systemic inflammatory response syndrome) (HCC)  Evidenced by tachycardia, tachypnea, and leukopenia  Tachycardia in setting of atrial fibrillation/alcohol withdrawal and has chronic decreased WBC  Likely reactive due to possible aspiration, withdrawal  Procalcitonin negative  Follow up blood cultures  Continue rocephin for now  Pancytopenia (HCC)  Likely sequela of chronic alcoholism  trend CBC  Alcohol intoxication (HCC)  Frequent ED visits/admissions due to alcohol intoxication and withdrawal  Continue CIWA protocol  Thiamine, folic acid, multivitamin  Replete electrolytes as needed  History of Clostridium difficile infection  Continue prophylactic oral vancomycin while on abx  Elevated alkaline phosphatase  In the setting of chronic  "alcoholism  Limit/avoid hepatotoxins as possible  CAD (coronary artery disease)  Status post prior CABG  Continue ASA/statin/beta blocker  COPD (chronic obstructive pulmonary disease) (HCC)  Continue IC/LABA maintenance regimen - PRN Xopenex nebulization  Essential hypertension  Continue home Toprol XL and Cardizem  BP currently stable    VTE Pharmacologic Prophylaxis: VTE Score: 4 Moderate Risk (Score 3-4) - Pharmacological DVT Prophylaxis Ordered: apixaban (Eliquis).    Mobility:   Basic Mobility Inpatient Raw Score: 18  JH-HLM Goal: 6: Walk 10 steps or more  JH-HLM Achieved: 2: Bed activities/Dependent transfer  JH-HLM Goal NOT achieved. Continue with multidisciplinary rounding and encourage appropriate mobility to improve upon JH-HLM goals.    Patient Centered Rounds: I performed bedside rounds with nursing staff today.   Discussions with Specialists or Other Care Team Provider: cardiology, rn    Education and Discussions with Family / Patient: Patient declined call to .     Current Length of Stay: 1 day(s)  Current Patient Status: Inpatient   Certification Statement: The patient will continue to require additional inpatient hospital stay due to afib with RVR, acute respiratory failure  Discharge Plan: Anticipate discharge in 48-72 hrs to home.    Code Status: Level 1 - Full Code    Subjective   Patient reports feeling \"fine\" this morning. Asking for regular diet. Denies dizziness, chest pain, palpitations, shortness of breath, nausea, vomiting, or abdominal pain. Had a bowel movement this morning. Reports he has not been taking his cardiac mediations at home.    Objective :  Temp:  [98.2 °F (36.8 °C)-99 °F (37.2 °C)] 98.6 °F (37 °C)  HR:  [] 113  BP: ()/(36-96) 152/92  Resp:  [18-35] 18  SpO2:  [84 %-100 %] 93 %  O2 Device: Nasal cannula  Nasal Cannula O2 Flow Rate (L/min):  [2 L/min] 2 L/min    Body mass index is 22.3 kg/m².     Input and Output Summary (last 24 hours): "     Intake/Output Summary (Last 24 hours) at 7/1/2025 1310  Last data filed at 6/30/2025 2137  Gross per 24 hour   Intake 50 ml   Output 300 ml   Net -250 ml       Physical Exam  Vitals and nursing note reviewed.   Constitutional:       General: He is not in acute distress.     Appearance: He is well-developed. He is ill-appearing (chronically).     Cardiovascular:      Rate and Rhythm: Tachycardia present. Rhythm irregular.      Heart sounds: No murmur heard.     Comments: -120s  Pulmonary:      Effort: Pulmonary effort is normal. No respiratory distress.      Breath sounds: Normal breath sounds.   Abdominal:      Palpations: Abdomen is soft.      Tenderness: There is no abdominal tenderness.     Musculoskeletal:         General: No swelling.     Skin:     General: Skin is warm and dry.      Capillary Refill: Capillary refill takes less than 2 seconds.     Neurological:      Mental Status: He is alert. Mental status is at baseline.     Psychiatric:         Mood and Affect: Mood normal.           Lines/Drains:        Telemetry:  Telemetry Orders (From admission, onward)               24 Hour Telemetry Monitoring  Continuous x 24 Hours (Telem)        Expiring   Question:  Reason for 24 Hour Telemetry  Answer:  Arrhythmias requiring acute medical intervention / PPM or ICD malfunction                     Telemetry Reviewed: Atrial fibrillation. HR averaging 120  Indication for Continued Telemetry Use: Arrthymias requiring medical therapy               Lab Results: I have reviewed the following results:   Results from last 7 days   Lab Units 07/01/25  0443   WBC Thousand/uL 3.20*   HEMOGLOBIN g/dL 9.9*   HEMATOCRIT % 31.4*   PLATELETS Thousands/uL 47*   SEGS PCT % 50   LYMPHO PCT % 35   MONO PCT % 11   EOS PCT % 3     Results from last 7 days   Lab Units 07/01/25  0443   SODIUM mmol/L 140   POTASSIUM mmol/L 3.4*   CHLORIDE mmol/L 100   CO2 mmol/L 28   BUN mg/dL 15   CREATININE mg/dL 1.02   ANION GAP mmol/L 12    CALCIUM mg/dL 7.1*   ALBUMIN g/dL 3.2*   TOTAL BILIRUBIN mg/dL 0.71   ALK PHOS U/L 146*   ALT U/L 23   AST U/L 52*   GLUCOSE RANDOM mg/dL 70     Results from last 7 days   Lab Units 06/30/25  1725   INR  1.05             Results from last 7 days   Lab Units 07/01/25  0443 06/30/25  1725   LACTIC ACID mmol/L  --  1.3   PROCALCITONIN ng/ml 0.07 0.09       Recent Cultures (last 7 days):   Results from last 7 days   Lab Units 06/30/25  1725   BLOOD CULTURE  Received in Microbiology Lab. Culture in Progress.  Received in Microbiology Lab. Culture in Progress.       Imaging Results Review: I reviewed radiology reports from this admission including: chest xray, CT head, and CT C-spine.  Other Study Results Review: EKG was reviewed.     Last 24 Hours Medication List:     Current Facility-Administered Medications:     acetaminophen (TYLENOL) tablet 650 mg, Q6H PRN    apixaban (ELIQUIS) tablet 5 mg, BID    aspirin chewable tablet 81 mg, Daily    atorvastatin (LIPITOR) tablet 40 mg, Daily With Dinner    cefTRIAXone (ROCEPHIN) IVPB (premix in dextrose) 1,000 mg 50 mL, Once    cefTRIAXone (ROCEPHIN) IVPB (premix in dextrose) 1,000 mg 50 mL, Q24H    dextromethorphan-guaiFENesin (ROBITUSSIN DM) oral syrup 10 mL, Q4H PRN    diltiazem (CARDIZEM SR) 12 hr capsule 120 mg, BID    ferrous sulfate tablet 325 mg, Daily With Breakfast    fluticasone-vilanterol 200-25 mcg/actuation 1 puff, Daily    folic acid (FOLVITE) tablet 1 mg, Daily    furosemide (LASIX) injection 20 mg, Once    gabapentin (NEURONTIN) capsule 300 mg, TID    hydrALAZINE (APRESOLINE) injection 5 mg, Q6H PRN    levalbuterol (XOPENEX) inhalation solution 1.25 mg, Q8H PRN **AND** [DISCONTINUED] sodium chloride 0.9 % inhalation solution 3 mL, Q6H PRN    magnesium sulfate 4 g/100 mL IVPB (premix) 4 g, Once, Last Rate: 4 g (07/01/25 0918)    metoprolol succinate (TOPROL-XL) 24 hr tablet 100 mg, Daily    multivitamin stress formula tablet 1 tablet, Daily    pantoprazole  (PROTONIX) EC tablet 40 mg, Daily    ranolazine (RANEXA) 12 hr tablet 500 mg, BID    saccharomyces boulardii (FLORASTOR) capsule 250 mg, BID    tamsulosin (FLOMAX) capsule 0.4 mg, Daily With Dinner    thiamine tablet 100 mg, Daily    trimethobenzamide (TIGAN) IM injection 200 mg, Q6H PRN    vancomycin (VANCOCIN) oral solution 125 mg, Q12H SEDA    Administrative Statements   Today, Patient Was Seen By: Zenaida Larson PA-C    **Please Note: This note may have been constructed using a voice recognition system.**

## 2025-07-01 NOTE — ASSESSMENT & PLAN NOTE
Frequent ED visits/admissions due to alcohol intoxication and withdrawal  Continue CIWA protocol  Thiamine, folic acid, multivitamin  Replete electrolytes as needed

## 2025-07-01 NOTE — PLAN OF CARE
Problem: Potential for Falls  Goal: Patient will remain free of falls  Description: INTERVENTIONS:  - Educate patient/family on patient safety including physical limitations  - Instruct patient to call for assistance with activity   - Consider consulting OT/PT to assist with strengthening/mobility based on AM PAC & JH-HLM score  - Consult OT/PT to assist with strengthening/mobility   - Keep Call bell within reach  - Keep bed low and locked with side rails adjusted as appropriate  - Keep care items and personal belongings within reach  - Initiate and maintain comfort rounds  - Make Fall Risk Sign visible to staff  - Offer Toileting every 2 Hours, in advance of need  - Initiate/Maintain bed alarm  - Obtain necessary fall risk management equipment: such as chair alarm when OOB  - Apply yellow socks and bracelet for high fall risk patients  - Consider moving patient to room near nurses station  7/1/2025 0212 by Dionne Ledesma RN  Outcome: Progressing  7/1/2025 0212 by Dionne Ledesma RN  Outcome: Progressing  7/1/2025 0211 by Dionne Ledesma RN  Outcome: Progressing     Problem: RESPIRATORY - ADULT  Goal: Achieves optimal ventilation and oxygenation  Description: INTERVENTIONS:  - Assess for changes in respiratory status  - Assess for changes in mentation and behavior  - Position to facilitate oxygenation and minimize respiratory effort  - Oxygen administered by appropriate delivery if ordered  - Initiate smoking cessation education as indicated  - Encourage broncho-pulmonary hygiene including cough, deep breathe, Incentive Spirometry  - Assess the need for suctioning and aspirate as needed  - Assess and instruct to report SOB or any respiratory difficulty  - Respiratory Therapy support as indicated  7/1/2025 0212 by Dionne Ledesma RN  Outcome: Progressing  7/1/2025 0212 by Dionne Ledesma RN  Outcome: Progressing  7/1/2025 0211 by Dionne Ledesma RN  Outcome: Progressing     Problem:  Nutrition/Hydration-ADULT  Goal: Nutrient/Hydration intake appropriate for improving, restoring or maintaining nutritional needs  Description: Monitor and assess patient's nutrition/hydration status for malnutrition. Collaborate with interdisciplinary team and initiate plan and interventions as ordered.  Monitor patient's weight and dietary intake as ordered or per policy. Utilize nutrition screening tool and intervene as necessary. Determine patient's food preferences and provide high-protein, high-caloric foods as appropriate.     INTERVENTIONS:  - Monitor oral intake, urinary output, labs, and treatment plans  - Assess nutrition and hydration status and recommend course of action  - Evaluate amount of meals eaten  - Assist patient with eating if necessary   - Allow adequate time for meals  - Recommend/ encourage appropriate diets, oral nutritional supplements, and vitamin/mineral supplements  - Order, calculate, and assess calorie counts as needed  - Recommend, monitor, and adjust tube feedings and TPN/PPN based on assessed needs  - Assess need for intravenous fluids  - Provide specific nutrition/hydration education as appropriate  - Include patient/family/caregiver in decisions related to nutrition  7/1/2025 0212 by Dionne Ledesma RN  Outcome: Progressing  7/1/2025 0212 by Dionne Ledesma RN  Outcome: Progressing

## 2025-07-01 NOTE — UTILIZATION REVIEW
Initial Clinical Review    Admission: Date/Time/Statement:   Admission Orders (From admission, onward)       Ordered        06/30/25 1653  INPATIENT ADMISSION  Once                          Orders Placed This Encounter   Procedures    INPATIENT ADMISSION     Standing Status:   Standing     Number of Occurrences:   1     Level of Care:   Med Surg [16]     Estimated length of stay:   More than 2 Midnights     Certification:   I certify that inpatient services are medically necessary for this patient for a duration of greater than two midnights. See H&P and MD Progress Notes for additional information about the patient's course of treatment.     ED Arrival Information       Expected   -    Arrival   6/30/2025 14:06    Acuity   Less Urgent              Means of arrival   [facility]    Admission type   Emergency              Arrival complaint   etoh             Chief Complaint   Patient presents with    Alcohol Intoxication     Could not transfer from chair to walker due to intoxication       Initial Presentation:   65 yom to ER from home via EMS. Pt presents to the emergency room for generalized weakness. Was seen here earlier this morning for alcohol intoxication. Ultimately discharged. Went home. Again felt generally weak so called 911. On arrival patient noted to be hypoxic. Will not tell me if he fell or not. Hx alcohol abuse/withdrawal, afib, COPD, CAD/CABG, CHF, HLD, HTN, prostate Ca. Presents tachypneic, hypoxic with coarse breath sounds bilaterally. Admission  CXR: Mild central pulmonary vascular congestion. Left pleural fluid. Left seventh rib fracture. No visible pneumothorax. Labs: WBC 3.25, Hgb 10.4, PLT 57, anion gap 15, Ca 7.5, ast 75, alk phos 149, alb 3.3, , ETOH 392.   Admitted to inpatient status for acute respiratory failure with hypoxia. Plan: [facility] protocol, telemetry, therapies.    Anticipated Length of Stay/Certification Statement:    Patient will be admitted on an Inpatient basis with an anticipated length of stay of greater than 2 midnights.   Justification for Hospital Stay: Shortness of breath/hypoxia in the setting of chronic alcoholism and possible aspiration, requiring hemodynamic monitoring, infectious workup, and supplemental oxygen maintenance.     Date: 7/1/25   Day 2:   IVABT continued, cultures pending. Speech consulted for swallow eval, r/o aspiration. O2 requirements @ 2ltr nc, wean as able to tolerate. IV Lasix x1 given. Persistent tachycardia, hx afib. Continue Eliquis, cardizem, Toprol XL. Monitor respiratory status, O2 needs, VS/HR.     Date: 7/2/25  Day 3: Has surpassed a 2nd midnight with active treatments and services.  Respiratory failure; O2 weaned to RA. IVABT continued. Exam: both eyes with discharge noted, bilateral hands, lungs with diminished breath sounds at bases. Remains on CIWA protocol; score 9 for N&V, tremors, anxiety, headache. IV Mg repletion given. IV lopressor given for tachycardia. Continue to monitor respiratory status & O2 needs, VS, follow labs, follow up cultures.     ED Treatment-Medication Administration from 06/30/2025 1406 to 06/30/2025 1809         Date/Time Order Dose Route Action     06/30/2025 1552 metoprolol (LOPRESSOR) injection 5 mg 5 mg Intravenous Given     06/30/2025 1556 magnesium sulfate 2 g/50 mL IVPB (premix) 2 g 2 g Intravenous New Bag     06/30/2025 1705 metoprolol (LOPRESSOR) injection 5 mg 5 mg Intravenous Given     06/30/2025 1645 thiamine (VITAMIN B1) 100 mg in dextrose 5 % 100 mL IVPB 100 mg Intravenous New Bag     06/30/2025 1705 folic acid 1 mg in sodium chloride 0.9 % 50 mL IVPB 1 mg Intravenous New Bag            Scheduled Medications:  Medications 06/23 06/24 06/25 06/26 06/27 06/28 06/29 06/30 07/01 07/02   acetaminophen (TYLENOL) tablet 650 mg  Dose: 650 mg  Freq: Once Route: PO  Start: 06/24/25 1400 End: 06/24/25 1348     1348                apixaban (ELIQUIS) tablet  5 mg  Dose: 5 mg  Freq: 2 times daily Route: PO  Start: 06/30/25 1845   Admin Instructions:      Order specific questions:              1840      0917     1739      0840     1800        aspirin chewable tablet 81 mg  Dose: 81 mg  Freq: Daily Route: PO  Start: 07/01/25 0900            0918      0839        atorvastatin (LIPITOR) tablet 40 mg  Dose: 40 mg  Freq: Daily with dinner Route: PO  Start: 06/30/25 1845           1845      1714      1630        atorvastatin (LIPITOR) tablet 40 mg  Dose: 40 mg  Freq: Daily with dinner Route: PO  Start: 06/26/25 0130 End: 06/26/25 1215       0127     1215-D/C'd            atorvastatin (LIPITOR) tablet 40 mg  Dose: 40 mg  Freq: Daily with dinner Route: PO  Start: 06/26/25 1800 End: 06/26/25 0115       0115-D/C'd            cefTRIAXone (ROCEPHIN) IVPB (premix in dextrose) 1,000 mg 50 mL  Dose: 1,000 mg  Freq: Every 24 hours Route: IV  Last Dose: 1,000 mg (07/01/25 1839)  Start: 07/01/25 1800   Admin Instructions:      Order specific questions:               1839      1800        cefTRIAXone (ROCEPHIN) IVPB (premix in dextrose) 1,000 mg 50 mL  Dose: 1,000 mg  Freq: Once Route: IV  Start: 06/30/25 1730   Admin Instructions:      Order specific questions:              1730          chlordiazePOXIDE (LIBRIUM) capsule 50 mg  Dose: 50 mg  Freq: Once Route: PO  Start: 06/28/25 0345 End: 06/28/25 0343   Admin Instructions:            0343            dextrose 50 % IV solution 25 mL  Dose: 25 mL  Freq: Once Route: IV  Start: 06/22/25 1945 End: 06/23/25 0057    0057-D/C'd               diltiazem (CARDIZEM SR) 12 hr capsule 120 mg  Dose: 120 mg  Freq: 2 times daily Route: PO  Start: 06/30/25 1845   Admin Instructions:      Order specific questions:              1840      0929 [phone]     1800        diltiazem (CARDIZEM) tablet 60 mg  Dose: 60 mg  Freq: Once Route: PO  Start: 06/26/25 0115 End: 06/26/25 0107   Admin Instructions:      Order specific questions:          0107 [C]               ferrous sulfate tablet 325 mg  Dose: 325 mg  Freq: Daily with breakfast Route: PO  Start: 07/01/25 0730   Admin Instructions:               0918      0840        fluticasone-vilanterol 200-25 mcg/actuation 1 puff  Dose: 1 puff  Freq: Daily (RESP) Route: IN  Start: 07/01/25 0800   Admin Instructions:               0933      0846        folic acid (FOLVITE) tablet 1 mg  Dose: 1 mg  Freq: Daily Route: PO  Start: 07/01/25 0900            0918      0840        folic acid 1 mg in sodium chloride 0.9 % 50 mL IVPB  Dose: 1 mg  Freq: Once Route: IV  Last Dose: 1 mg (06/30/25 1705)  Start: 06/30/25 1600 End: 06/30/25 1735           1705          furosemide (LASIX) injection 20 mg  Dose: 20 mg  Freq: Once Route: IV  Start: 07/01/25 1200 End: 07/01/25 1317   Admin Instructions:      Order specific questions:               1317 [C]         furosemide (LASIX) injection 40 mg  Dose: 40 mg  Freq: Once Route: IV  Start: 06/30/25 1830 End: 07/01/25 1030   Admin Instructions:      Order specific questions:              1845      1030-D/C'd       gabapentin (NEURONTIN) capsule 300 mg  Dose: 300 mg  Freq: 3 times daily Route: PO  Start: 06/30/25 2100   Admin Instructions:              2109      0918     1714     2102      0839     1600     2100        lactated ringers bolus 1,000 mL  Dose: 1,000 mL  Freq: Once Route: IV  Last Dose: Stopped (06/24/25 1335)  Start: 06/24/25 1115 End: 06/24/25 1335     1235     1335                LORazepam (ATIVAN) tablet 1 mg  Dose: 1 mg  Freq: Once Route: PO  Start: 06/27/25 1715 End: 06/27/25 1804   Admin Instructions:           1804             LORazepam (ATIVAN) tablet 2 mg  Dose: 2 mg  Freq: Once Route: PO  Indications of Use: ALCOHOL WITHDRAWAL SYNDROME  Start: 07/02/25 1430 End: 07/02/25 1422   Admin Instructions:                1422        LORazepam (ATIVAN) tablet 2 mg  Dose: 2 mg  Freq: Once Route: PO  Indications of Use: ALCOHOL WITHDRAWAL SYNDROME  Start: 07/01/25 1015 End:  07/01/25 1011   Admin Instructions:               1011         LORazepam (ATIVAN) tablet 2 mg  Dose: 2 mg  Freq: Once Route: PO  Indications of Use: ALCOHOL WITHDRAWAL SYNDROME  Start: 06/30/25 2300 End: 06/30/25 2300   Admin Instructions:              2300          magnesium sulfate 2 g/50 mL IVPB (premix) 2 g  Dose: 2 g  Freq: Once Route: IV  Last Dose: 2 g (07/02/25 0914)  Start: 07/02/25 0730 End: 07/02/25 1114   Admin Instructions:                0914        magnesium sulfate 2 g/50 mL IVPB (premix) 2 g  Dose: 2 g  Freq: Once Route: IV  Last Dose: Stopped (06/30/25 1618)  Start: 06/30/25 1530 End: 06/30/25 1618   Admin Instructions:              1556     1618          magnesium sulfate 2 g/50 mL IVPB (premix) 2 g  Dose: 2 g  Freq: Once Route: IV  Last Dose: Stopped (06/25/25 1255)  Start: 06/25/25 1045 End: 06/25/25 1255   Admin Instructions:         1056     1255               magnesium sulfate 2 g/50 mL IVPB (premix) 2 g  Dose: 2 g  Freq: Once Route: IV  Last Dose: Stopped (06/24/25 1235)  Start: 06/24/25 1115 End: 06/24/25 1235   Admin Instructions:        1127     1235                magnesium sulfate 4 g/100 mL IVPB (premix) 4 g  Dose: 4 g  Freq: Once Route: IV  Last Dose: 4 g (07/01/25 0918)  Start: 07/01/25 0830 End: 07/01/25 1318   Admin Instructions:               0918         metoprolol (LOPRESSOR) injection 5 mg  Dose: 5 mg  Freq: Once Route: IV  Start: 07/02/25 0900 End: 07/02/25 0912   Admin Instructions:                0912        metoprolol (LOPRESSOR) injection 5 mg  Dose: 5 mg  Freq: Once Route: IV  Start: 07/01/25 1015 End: 07/01/25 1011   Admin Instructions:               1011         metoprolol (LOPRESSOR) injection 5 mg  Dose: 5 mg  Freq: Once Route: IV  Start: 06/30/25 1600 End: 06/30/25 1705   Admin Instructions:              1705          metoprolol (LOPRESSOR) injection 5 mg  Dose: 5 mg  Freq: Once Route: IV  Start: 06/30/25 1530 End: 06/30/25 1552   Admin Instructions:               1552          metoprolol (LOPRESSOR) injection 5 mg  Dose: 5 mg  Freq: Once Route: IV  Start: 06/24/25 1315 End: 06/24/25 1331   Admin Instructions:        1331                metoprolol succinate (TOPROL-XL) 24 hr tablet 100 mg  Dose: 100 mg  Freq: Daily Route: PO  Start: 07/01/25 0900   Admin Instructions:      Order specific questions:               0918      0840        metoprolol succinate (TOPROL-XL) 24 hr tablet 100 mg  Dose: 100 mg  Freq: Once Route: PO  Start: 06/24/25 1345 End: 06/24/25 1347   Admin Instructions:      Order specific questions:        1347                metoprolol tartrate (LOPRESSOR) tablet 25 mg  Dose: 25 mg  Freq: Once Route: PO  Start: 06/26/25 0100 End: 06/26/25 0100   Admin Instructions:      Order specific questions:          0100-D/C'd  (0103) [C]              metoprolol tartrate (LOPRESSOR) tablet 50 mg  Dose: 50 mg  Freq: Once Route: PO  Start: 06/28/25 0345 End: 06/28/25 0343   Admin Instructions:      Order specific questions:            0343            metoprolol tartrate (LOPRESSOR) tablet 50 mg  Dose: 50 mg  Freq: Once Route: PO  Start: 06/26/25 0115 End: 06/26/25 0107   Admin Instructions:      Order specific questions:          0107 [C]              multivitamin stress formula tablet 1 tablet  Dose: 1 tablet  Freq: Daily Route: PO  Start: 07/01/25 0900   Admin Instructions:               0918 0839        OLANZapine (ZyPREXA ZYDIS) dispersible tablet 10 mg  Dose: 10 mg  Freq: Once Route: PO  Start: 06/27/25 1715 End: 06/27/25 1804   Admin Instructions:           1804             pantoprazole (PROTONIX) EC tablet 40 mg  Dose: 40 mg  Freq: Daily Route: PO  Start: 07/01/25 0900   Admin Instructions:               0918 0839        potassium chloride (Klor-Con M20) CR tablet 40 mEq  Dose: 40 mEq  Freq: Once Route: PO  Start: 07/01/25 0830 End: 07/01/25 0917   Admin Instructions:               0917         ranolazine (RANEXA) 12 hr tablet 500 mg  Dose: 500 mg  Freq: 2  times daily Route: PO  Start: 06/30/25 1845   Admin Instructions:              1845      0918     1722      0840     1800        saccharomyces boulardii (FLORASTOR) capsule 250 mg  Dose: 250 mg  Freq: 2 times daily Route: PO  Start: 06/30/25 1845 1845      0917     1719      0839     1800        sodium chloride 0.9 % bolus 1,000 mL  Dose: 1,000 mL  Freq: Once Route: IV  Start: 06/30/25 1600 End: 06/30/25 1559           1559-D/C'd        sodium chloride 0.9 % bolus 1,000 mL  Dose: 1,000 mL  Freq: Once Route: IV  Last Dose: Stopped (06/25/25 1224)  Start: 06/25/25 0945 End: 06/25/25 1224      1010     1224               sodium chloride 0.9 % bolus 1,000 mL  Dose: 1,000 mL  Freq: Once Route: IV  Last Dose: Stopped (06/24/25 1234)  Start: 06/24/25 1015 End: 06/24/25 1234     1039     1234                tamsulosin (FLOMAX) capsule 0.4 mg  Dose: 0.4 mg  Freq: Daily with dinner Route: PO  Start: 06/30/25 1845   Admin Instructions:              1844      1714      1630        thiamine (VITAMIN B1) 100 mg in dextrose 5 % 100 mL IVPB  Dose: 100 mg  Freq: Once Route: IV  Last Dose: 100 mg (06/30/25 1645)  Start: 06/30/25 1600 End: 06/30/25 1715           1645          thiamine tablet 100 mg  Dose: 100 mg  Freq: Daily Route: PO  Start: 07/01/25 0900            0917      0839        tobramycin-dexamethasone (TOBRADEX) 0.3-0.1 % ophthalmic suspension 1 drop  Dose: 1 drop  Freq: Every 6 hours scheduled Route: Both Eyes  Start: 07/02/25 1200   Admin Instructions:                1110     1800        vancomycin (VANCOCIN) oral solution 125 mg  Dose: 125 mg  Freq: Every 12 hours scheduled Route: PO  Start: 06/30/25 2100   Admin Instructions:      Order specific questions:              0237      0902     2101      0848     2100        Legend:       [id number]/2606/2706/2806/2906/3007/0107/02        Continuous Meds Sorted by Name  for Merunka, Gregg as of 06/23/25 through 7/2/25  Legend:       Medications  06/23 06/24 06/25 06/26 06/27 06/28 06/29 06/30 07/01 07/02   sodium chloride 0.9 % infusion  Rate: 75 mL/hr Dose: 75 mL/hr  Freq: Continuous Route: IV  Start: 06/30/25 1800 End: 06/30/25 1750 1750-D/C'd        Legend:       Axssldijksd83/2306/2406/2506/2606/2706/2806/2906/3007/0107/02        PRN Meds Sorted by Name  for Gregg Partida as of 06/23/25 through 7/2/25  Legend:       Medications 06/23 06/24 06/25 06/26 06/27 06/28 06/29 06/30 07/01 07/02   acetaminophen (TYLENOL) tablet 650 mg  Dose: 650 mg  Freq: Every 6 hours PRN Route: PO  PRN Reasons: mild pain,headaches,fever  Start: 06/30/25 1757 2102         dextromethorphan-guaiFENesin (ROBITUSSIN DM) oral syrup 10 mL  Dose: 10 mL  Freq: Every 4 hours PRN Route: PO  PRN Reasons: cough,congestion  Start: 06/30/25 1752                hydrALAZINE (APRESOLINE) injection 5 mg  Dose: 5 mg  Freq: Every 6 hours PRN Route: IV  PRN Reason: high blood pressure  PRN Comment: sbp > 160 or dbp > 100  Start: 06/30/25 1757   Admin Instructions:      Order specific questions:               (0045) [C]          levalbuterol (XOPENEX) inhalation solution 1.25 mg  Dose: 1.25 mg  Freq: Every 8 hours PRN Route: NEBULIZATION  PRN Reasons: wheezing,shortness of breath  Start: 06/30/25 1759                And   sodium chloride 0.9 % inhalation solution 3 mL  Dose: 3 mL  Freq: Every 6 hours PRN Route: NEBULIZATION  PRN Reasons: wheezing,shortness of breath  Start: 06/30/25 1758 End: 06/30/25 1810 1810-D/C'd        trimethobenzamide (TIGAN) IM injection 200 mg  Dose: 200 mg  Freq: Every 6 hours PRN Route: IM  PRN Reasons: nausea,vomiting  Start: 06/30/25 1757   Admin Instructions:                                 ED Triage Vitals   Temperature Pulse Respirations Blood Pressure SpO2 Pain Score   06/30/25 1812 06/30/25 1432 06/30/25 1530 06/30/25 1424 06/30/25 1424 06/30/25 2100   98.2 °F (36.8 °C) 78 (!) 25 112/69 (!) 84 % No Pain     Weight (last 2 days)        Date/Time Weight    07/01/25 00:27:25 78.8 (173.72)            Vital Signs (last 3 days)       Date/Time Temp Pulse Resp BP MAP (mmHg) SpO2 Calculated FIO2 (%) - Nasal Cannula Nasal Cannula O2 Flow Rate (L/min) O2 Device Patient Position - Orthostatic VS CIWA-Ar Total Pain    07/02/25 14:48:19 98.4 °F (36.9 °C) 78 18 107/66 80 97 % -- -- -- -- -- --    07/02/25 14:13:28 -- 67 18 122/69 87 96 % -- -- -- -- -- --    07/02/25 1412 -- -- -- -- -- -- -- -- -- -- 9 --    07/02/25 12:17:35 -- 93 -- 104/60 75 95 % -- -- -- -- -- --    07/02/25 1105 -- -- -- -- -- -- -- -- -- -- -- No Pain    07/02/25 11:00:39 -- 92 -- 112/68 83 97 % -- -- -- -- -- --    07/02/25 09:21:35 -- 115 -- -- -- 97 % -- -- None (Room air) -- -- --    07/02/25 09:13:10 -- 134 -- 128/73 91 -- -- -- -- -- -- --    07/02/25 08:01:37 -- 123 -- 149/94 112 93 % -- -- -- -- -- --    07/02/25 02:56:22 98.8 °F (37.1 °C) 103 18 131/86 101 93 % -- -- -- -- -- --    07/02/25 02:56:09 98.8 °F (37.1 °C) 104 18 131/86 101 93 % -- -- -- -- -- --    07/01/25 22:32:24 99.3 °F (37.4 °C) 114 -- 131/87 102 93 % -- -- -- -- -- --    07/01/25 2102 -- -- -- -- -- -- -- -- -- -- -- 2    07/01/25 2000 -- 103 -- 130/74 -- -- -- -- -- -- 5 --    07/01/25 19:53:37 -- 102 20 130/74 93 89 % -- -- -- -- -- --    07/01/25 1900 99.2 °F (37.3 °C) 83 16 -- -- 93 % -- -- -- -- -- --    07/01/25 17:28:53 -- 96 -- 102/69 -- 93 % -- -- -- -- 3 --    07/01/25 1655 -- 98 -- -- -- 93 % -- -- -- -- -- --    07/01/25 1555 -- 75 -- -- -- 93 % -- -- -- -- -- --    07/01/25 14:55:26 98.1 °F (36.7 °C) 98 -- 115/65 82 92 % -- -- -- -- 1 --    07/01/25 1442 -- -- -- -- -- -- -- -- -- -- -- No Pain    07/01/25 13:16:48 -- 93 -- 109/74 86 95 % -- -- -- -- -- --    07/01/25 11:10:33 98.6 °F (37 °C) 113 -- 152/92 112 93 % -- -- -- -- 1 --    07/01/25 1040 -- 108 -- -- -- 97 % -- -- -- -- -- --    07/01/25 1020 -- 97 -- -- -- 94 % -- -- -- -- -- --    07/01/25 10:08:37 -- 87 -- 149/96 114 92 % -- --  -- -- -- --    07/01/25 0959 -- -- -- -- -- -- -- -- -- -- 8 --    07/01/25 09:16:28 99 °F (37.2 °C) 96 -- 153/92 112 95 % -- -- -- -- -- --    07/01/25 09:15:47 -- 136 18 153/92 112 95 % -- -- -- -- -- --    07/01/25 0600 -- -- -- -- -- 95 % -- -- -- -- -- --    07/01/25 0400 -- 100 -- 128/87 101 97 % -- -- -- -- 4 --    07/01/25 0200 98.2 °F (36.8 °C) 111 20 162/90 114 96 % -- -- -- Lying -- --    07/01/25 01:00:07 -- 100 18 121/83 96 97 % -- -- -- -- -- --    07/01/25 00:27:25 98.2 °F (36.8 °C) 91 20 147/90 109 97 % -- -- -- Lying -- --    07/01/25 0000 -- -- -- -- -- 98 % -- -- -- -- -- --    06/30/25 22:45:04 98.2 °F (36.8 °C) 113 18 165/96 119 97 % -- -- -- -- 8 --    06/30/25 2200 -- 105 -- 148/93 111 86 % -- -- -- -- 8 --    06/30/25 21:03:09 -- 129 18 110/70 83 100 % -- -- -- -- -- --    06/30/25 2100 -- 84 -- 110/70 -- -- 28 2 L/min Nasal cannula -- -- No Pain    06/30/25 2000 -- 100 -- 113/76 88 93 % -- -- -- -- -- --    06/30/25 1940 -- -- -- -- -- 96 % -- -- None (Room air) -- -- --    06/30/25 18:26:58 -- 100 -- 85/49 61 97 % -- -- -- -- -- --    06/30/25 1812 98.2 °F (36.8 °C) 114 20 66/36 46 97 % -- -- -- Lying -- --    06/30/25 1732 -- 97 20 -- -- 99 % -- -- -- -- -- --    06/30/25 1632 -- 107 29 -- -- 97 % -- -- -- -- -- --    06/30/25 1604 -- 110 -- -- -- -- -- -- -- -- -- --    06/30/25 1600 -- 121 35 134/60 86 88 % -- -- -- -- -- --    06/30/25 1530 -- 145 25 -- -- 95 % -- -- -- -- -- --    06/30/25 1432 -- 78 -- 103/59 76 92 % -- -- -- -- -- --    06/30/25 1424 -- -- -- 112/69 82 84 % -- -- None (Room air) Lying -- --           CIWA-Ar Score       Row Name 07/02/25 1412 07/01/25 2000 07/01/25 17:28:53       CIWA-Ar    BP -- 130/74 --    Pulse -- 103 --    Nausea and Vomiting 1 0 0    Tactile Disturbances 0 0 0    Tremor 3 2 2    Auditory Disturbances 0 0 0    Paroxysmal Sweats 0 0 0    Visual Disturbances 0 0 0    Anxiety 4 2 1    Headache, Fullness in Head 1 1 0    Agitation 0 0 0     Orientation and Clouding of Sensorium 0 0 0    CIWA-Ar Total 9 5 3      Row Name 07/01/25 14:55:26 07/01/25 11:10:33 07/01/25 0959       CIWA-Ar    Nausea and Vomiting 0 0 0    Tactile Disturbances 0 0 0    Tremor 1 1 5    Auditory Disturbances 0 0 0    Paroxysmal Sweats 0 0 1    Visual Disturbances 0 0 0    Anxiety 0 0 1    Headache, Fullness in Head 0 0 0    Agitation 0 0 1    Orientation and Clouding of Sensorium 0 0 0    CIWA-Ar Total 1 1 8      Row Name 07/01/25 0400 06/30/25 22:45:04 06/30/25 2200       CIWA-Ar    BP -- -- 148/93    Pulse -- -- 105    Nausea and Vomiting 1 1 1    Tactile Disturbances 0 1 1    Tremor 2 2 2    Auditory Disturbances 0 0 0    Paroxysmal Sweats 0 0 0    Visual Disturbances 0 0 0    Anxiety 1 1 1    Headache, Fullness in Head 0 2 2    Agitation 0 1 1    Orientation and Clouding of Sensorium 0 0 0    CIWA-Ar Total 4 8 8      Row Name 06/30/25 2100             CIWA-Ar    /70      Pulse 84                      Pertinent Labs/Diagnostic Test Results:   Radiology:  CT head without contrast   Final Interpretation by Navneet Elena MD (06/30 1534)      1.  No acute intracranial abnormality.   2.  No acute cervical spine fracture or traumatic malalignment.   3.  Extensive postsurgical changes in the cervical spine with a posterior paraspinal fluid collection spanning the surgical levels.   This is unchanged from prior exam.                  Workstation performed: [id number]         CT spine cervical without contrast   Final Interpretation by Navneet Elena MD (06/30 1534)      1.  No acute intracranial abnormality.   2.  No acute cervical spine fracture or traumatic malalignment.   3.  Extensive postsurgical changes in the cervical spine with a posterior paraspinal fluid collection spanning the surgical levels.   This is unchanged from prior exam.                  Workstation performed: [id number]         XR chest 1 view portable   ED Interpretation by Gilberto North DO (06/30 1937)    Mild vascular congestion      Final Interpretation by Angel Smart MD (07/01 1013)      Mild central pulmonary vascular congestion.   Left pleural fluid.   Left seventh rib fracture   No visible pneumothorax      The study was marked in EPIC for immediate notification.            Workstation performed: [id number]           Cardiology:  ECG 12 lead   Final Result by Alec Hernández MD (07/01 1748)   Atrial fibrillation with rapid ventricular response   Low voltage QRS   Nonspecific ST abnormality   Abnormal QRS-T angle, consider primary T wave abnormality   Abnormal ECG   When compared with ECG of 01-Jul-2025 08:13, (unconfirmed)   No significant change was found   Confirmed by Alec Hernández ([id number]) on 7/1/2025 5:48:13 PM      ECG 12 lead   Final Result by Alec Hernández MD (07/01 1748)   Atrial fibrillation with rapid ventricular response   Low voltage QRS   ST & T wave abnormality, consider inferior ischemia   Abnormal ECG   When compared with ECG of 30-Jun-2025 14:36,   QRS axis Shifted left   Confirmed by Alec Hernández ([id number]) on 7/1/2025 5:48:07 PM      ECG 12 lead   Final Result by Alec Hernández MD (06/30 1910)   Atrial fibrillation with rapid ventricular response   Right axis deviation   Nonspecific ST and T wave abnormality   Abnormal ECG   When compared with ECG of 25-Jun-2025 09:46,   MANUAL COMPARISON REQUIRED PREVIOUS ECG IS INCOMPATIBLE   Confirmed by Alec Hernández ([id number]) on 6/30/2025 7:10:34 PM        GI:  No orders to display           Results from last 7 days   Lab Units 07/02/25 0637 07/01/25 0443 06/30/25  1458   WBC Thousand/uL 3.30* 3.20* 3.25*   HEMOGLOBIN g/dL 9.7* 9.9* 10.4*   HEMATOCRIT % 30.1* 31.4* 32.2*   PLATELETS Thousands/uL 38* 47* 57*   TOTAL NEUT ABS Thousands/µL 1.66* 1.59* 1.90         Results from last 7 days   Lab Units 07/02/25 0637 07/01/25 0443 06/30/25  1458   SODIUM mmol/L 135 140 144   POTASSIUM mmol/L 3.6 3.4* 3.5   CHLORIDE mmol/L 98 100  104   CO2 mmol/L 28 28 25   ANION GAP mmol/L 9 12 15*   BUN mg/dL 15 15 14   CREATININE mg/dL 0.94 1.02 1.10   EGFR ml/min/1.73sq m 85 77 71   CALCIUM mg/dL 8.0* 7.1* 7.5*   MAGNESIUM mg/dL 1.4* 1.3*  --    PHOSPHORUS mg/dL 2.4 3.5  --      Results from last 7 days   Lab Units 07/02/25  0637 07/01/25  0443 06/30/25  1458   AST U/L 43* 52* 75*   ALT U/L 20 23 29   ALK PHOS U/L 123* 146* 149*   TOTAL PROTEIN g/dL 6.3* 5.9* 6.5   ALBUMIN g/dL 3.1* 3.2* 3.3*   TOTAL BILIRUBIN mg/dL 0.89 0.71 0.73         Results from last 7 days   Lab Units 07/02/25  0637 07/01/25 0443 06/30/25  1458   GLUCOSE RANDOM mg/dL 100 70 77             BETA-HYDROXYBUTYRATE   Date Value Ref Range Status   01/22/2024 3.2 (H) <0.6 mmol/L Final                      Results from last 7 days   Lab Units 06/30/25  1725 06/30/25  1458 06/25/25  1509   HS TNI 0HR ng/L  --  31  --    HS TNI 2HR ng/L 30  --   --    HSTNI D2 ng/L -1  --   --    HS TNI 4HR ng/L  --   --  28   HSTNI D4 ng/L  --   --  -3         Results from last 7 days   Lab Units 06/30/25  1725   PROTIME seconds 14.3   INR  1.05   PTT seconds 29         Results from last 7 days   Lab Units 07/01/25  0443 06/30/25  1725   PROCALCITONIN ng/ml 0.07 0.09     Results from last 7 days   Lab Units 06/30/25  1725   LACTIC ACID mmol/L 1.3             Results from last 7 days   Lab Units 06/30/25  1458   BNP pg/mL 244*                                                       Results from last 7 days   Lab Units 06/30/25  1458   ETHANOL LVL mg/dL 392*                 Results from last 7 days   Lab Units 06/30/25  1725   BLOOD CULTURE  Staphylococcus epidermidis*  No Growth at 24 hrs.   GRAM STAIN RESULT  Gram positive cocci in clusters*                   Past Medical History[1]  Present on Admission:   Acute respiratory failure with hypoxia (HCC)   SIRS (systemic inflammatory response syndrome) (HCC)   Pancytopenia (HCC)   Elevated alkaline phosphatase   CAD (coronary artery disease)   Atrial  fibrillation (HCC)   COPD (chronic obstructive pulmonary disease) (HCC)   Essential hypertension      Admitting Diagnosis: Alcohol intoxication (HCC) [F10.929]  Age/Sex: 65 y.o. male    Network Utilization Review Department  ATTENTION: Please call with any questions or concerns to [phone] and carefully listen to the prompts so that you are directed to the right person. All voicemails are confidential.   For Discharge needs, contact Care Management DC Support Team at [phone] opt. 2  Send all requests for admission clinical reviews, approved or denied determinations and any other requests to dedicated fax number below belonging to the campus where the patient is receiving treatment. List of dedicated fax numbers for the Facilities:  FACILITY NAME UR FAX NUMBER   ADMISSION DENIALS (Administrative/Medical Necessity) [phone]   DISCHARGE SUPPORT TEAM (NETWORK) [phone]   PARENT CHILD HEALTH (Maternity/NICU/Pediatrics) [phone]   [facility] [phone]   [facility] [phone]   [facility] [phone]   [facility] [phone]   [facility] [phone]   [facility] [phone]   [facility] [phone]   [facility] [phone]   [facility] [phone]   [facility] [phone]   [facility] [phone]   [facility] [phone]              [1]   Past Medical History:  Diagnosis Date    Acute encephalopathy 06/07/2025    Acute on chronic kidney failure  (HCC)     Alcohol abuse     Alcohol withdrawal (HCC) 10/09/2018    Atrial fibrillation (HCC)     Cancer (HCC)     prostate ca,had radiation    Cardiac disease     stents,then triple bypass    COPD (chronic  obstructive pulmonary disease) ([facility])     Coronary artery disease     Elevated troponin 09/28/2023    ETOH abuse     Heart failure ([facility])     History of heart surgery     says triple bypass [facility]    Hx of heart artery stent     2014    Hyperlipidemia     Hypertension     Hypoglycemia 06/05/2025    Hyponatremia 10/17/2019    Hypovolemic shock ([facility]) 12/22/2019    Increased anion gap 04/21/2021    Lumbar spondylitis ([facility]) 10/13/2022    Metabolic acidosis, increased anion gap 04/21/2021    Nasal bone fracture 10/10/2022    Prostate CA ([facility])     S/P CABG x 3     2004    Sleep apnea

## 2025-07-01 NOTE — OCCUPATIONAL THERAPY NOTE
"  Occupational Therapy Evaluation       07/01/25 1442   OT Last Visit   OT Visit Date 07/01/25   Note Type   Note type Evaluation   Pain Assessment   Pain Assessment Tool 0-10   Pain Score No Pain   Restrictions/Precautions   Other Precautions Chair Alarm;Bed Alarm;Fall Risk;Pain  (CIWA protocol)   Home Living   Type of Home Apartment   Home Layout One level;Elevator   Bathroom Shower/Tub Tub/shower unit   Bathroom Toilet Standard   Bathroom Equipment Grab bars in shower;Grab bars around toilet   Home Equipment Cane;Other (Comment)  (rollator)   Prior Function   Level of [place] Independent with ADLs;Independent with functional mobility;Needs assistance with IADLS   Lives With Alone   IADLs Independent with meal prep;Independent with medication management;Family/Friend/Other provides transportation   Falls in the last 6 months >10   Comments frequent falls from ETOH abuse   General   Additional Pertinent History Pt presents with SOB and generalized weakness. Pt frequently admitted to ER for alcohol intoxication and initially planned to observe in ER as usual however pt with increased O2 requirements up to 4L from his baseline of room air thus pt was admitted.   Subjective   Subjective \"Can we do this after I get dinner?\"   ADL   Eating Assistance 7  Independent   Grooming Assistance 5  Supervision/Setup   UB Bathing Assistance 5  Supervision/Setup   LB Bathing Assistance 3  Moderate Assistance   UB Dressing Assistance 5  Supervision/Setup   LB Dressing Assistance 3  Moderate Assistance   Toileting Assistance  4  Minimal Assistance   Bed Mobility   Supine to Sit 5  Supervision   Additional items Assist x 1;Verbal cues;Increased time required;HOB elevated;Bedrails   Sit to Supine Unable to assess   Additional Comments transferred to bedside chair   Transfers   Sit to Stand 4  Minimal assistance   Additional items Assist x 1;Verbal cues;Increased time required   Stand to Sit 4  Minimal assistance   Additional " items Assist x 1;Verbal cues;Increased time required   Functional Mobility   Functional Mobility 4  Minimal assistance   Additional Comments few steps bed to chair   Additional items Rolling walker   Balance   Static Sitting Fair   Dynamic Sitting Fair -   Static Standing Fair -   Dynamic Standing Poor +   Activity Tolerance   Activity Tolerance Patient limited by fatigue   Nurse Made Aware CHRISTIE GARCIA Assessment   RUE Assessment WFL   LUE Assessment   LUE Assessment WFL   Cognition   Arousal/Participation Alert;Cooperative   Attention Difficulty attending to directions   Orientation Level Oriented to person   Memory Decreased short term memory;Decreased recall of recent events;Decreased recall of precautions   Following Commands Follows one step commands with increased time or repetition   Assessment   Limitation Decreased ADL status;Decreased UE strength;Decreased Safe judgement during ADL;Decreased cognition;Decreased endurance;Decreased self-care trans;Decreased high-level ADLs  (decreased balance and mobility)   Prognosis Good   Assessment Patient evaluated by Occupational Therapy.  Patient admitted with Acute respiratory failure with hypoxia (HCC).  The patients occupational profile, medical and therapy history includes a extensive additional review of physical, cognitive, or psychosocial history related to current functional performance.  Comorbidities affecting functional mobility and ADLS include: SIRS, pancytopenia, alcohol intoxication, CAD, a-fib, COPD, HTN.  Prior to admission, patient was independent with functional mobility with rollator, independent with ADLS, and requiring assist for IADLS.  The evaluation identifies the following performance deficits: weakness, impaired balance, decreased endurance, increased fall risk, new onset of impairment of functional mobility, decreased ADLS, decreased IADLS, decreased activity tolerance, decreased safety awareness, impaired judgement, decreased cognition,  decreased strength, and impaired psychosocial skills, that result in activity limitations and/or participation restrictions. This evaluation requires clinical decision making of high complexity, because the patient presents with comorbidites that affect occupational performance and required significant modification of tasks or assistance with consideration of multiple treatment options.  The Barthel Index was used as a functional outcome tool presenting with a score of 50, indicating marked limitations of functional mobility and ADLS.  The patient's raw score on the -PAC Daily Activity Inpatient Short Form is 17. A raw score of less than 19 suggests the patient may benefit from discharge to post-acute rehabilitation services. Please refer to the recommendation of the Occupational Therapist for safe discharge planning.  Patient will benefit from skilled Occupational Therapy services to address above deficits and facilitate a safe return to prior level of function.   Goals   Patient Goals to get something to eat   STG Time Frame   (1-7 days)   Short Term Goal  Goals established to promote Patient Goals: to get something to eat: Grooming: independent seated; Bathing: min assist; Upper Body Dressing independent; Lower Body Dressing: min assist; Toileting: supervision; Patient will increase ambulatory bed, chair, commode transfer to supervision with rolling walker to increase performance and safety with ADLS and functional mobility; Patient will increase standing tolerance to 3 minutes during ADL task to decrease assistance level and decrease fall risk; Patient will increase bed mobility to independent in preparation for ADLS and transfers; Patient will increase functional mobility to and from bathroom with rolling walker with supervision to increase performance with ADLS and to use a toilet; Patient will tolerate 8 minutes of UE ROM/strengthening to increase general activity tolerance and performance in ADLS/IADLS;  Patient will improve functional activity tolerance to 10 minutes of sustained functional tasks to increase participation in basic self-care and decrease assistance level;  Patient will be able to to verbalize understanding and perform energy conservation/proper body mechanics during ADLS and functional mobility at least 75% of the time with minimal cueing to decrease signs of fatigue and increase stamina to return to prior level of function; Patient will increase dynamic sitting balance to fair to improve the ability to sit at edge of bed or on a chair for ADLS;  Patient will increase dynamic standing balance to fair- to improve postural stability and decrease fall risk during standing ADLS and transfers.  Patient will tolerate out of bed to chair for 1-2 meals to increase ability to feed self and actively engage in ADLS.   LTG Time Frame   (8-14 days)   Long Term Goal Bathing: independent; Lower Body Dressing: independent; Toileting: independent; Patient will increase ambulatory bed, chair, commode transfer to independent with rolling walker to increase performance and safety with ADLS and functional mobility; Patient will increase standing tolerance to 6 minutes during ADL task to decrease assistance level and decrease fall risk; Patient will increase functional mobility to and from bathroom with rolling walker independently to increase performance with ADLS and to use a toilet; Patient will tolerate 15 minutes of UE ROM/strengthening to increase general activity tolerance and performance in ADLS/IADLS; Patient will improve functional activity tolerance to 20 minutes of sustained functional tasks to increase participation in basic self-care and decrease assistance level;  Patient will be able to to verbalize understanding and perform energy conservation/proper body mechanics during ADLS and functional mobility at least 90% of the time with no cueing to decrease signs of fatigue and increase stamina to return to  prior level of function; Patient will increase dynamic sitting balance to fair+ to improve the ability to sit at edge of bed or on a chair for ADLS;  Patient will increase dynamic standing balance to fair to improve postural stability and decrease fall risk during standing ADLS and transfers.  Patient will tolerate out of bed to chair for all meals to increase ability to feed self and actively engage in ADLS.  Pt will score >/= 21/24 on AM-PAC Daily Activity Inpatient scale to promote safe independence with ADLs and functional mobility; Pt will score >/= 80/100 on Barthel Index in order to decrease caregiver assistance needed and increase ability to perform ADLs and functional mobility.   Plan   Treatment Interventions ADL retraining;Functional transfer training;UE strengthening/ROM;Endurance training;Patient/family training;Equipment evaluation/education;Activityengagement;Compensatory technique education;Continued evaluation;Energy conservation   Goal Expiration Date 07/15/25   OT Frequency 3-5x/wk   Discharge Recommendation   Rehab Resource Intensity Level, OT II (Moderate Resource Intensity)  (pending pt progress and medical optimization pt may become appropriate for level III minimum resource intensity)   AM-PAC Daily Activity Inpatient   Lower Body Dressing 2   Bathing 2   Toileting 3   Upper Body Dressing 3   Grooming 3   Eating 4   Daily Activity Raw Score 17   Daily Activity Standardized Score (Calc for Raw Score >=11) 37.26   AM-PAC Applied Cognition Inpatient   Following a Speech/Presentation 3   Understanding Ordinary Conversation 4   Taking Medications 2   Remembering Where Things Are Placed or Put Away 2   Remembering List of 4-5 Errands 1   Taking Care of Complicated Tasks 1   Applied Cognition Raw Score 13   Applied Cognition Standardized Score 30.46   Barthel Index   Feeding 10   Bathing 0   Grooming Score 0   Dressing Score 5   Bladder Score 10   Bowels Score 10   Toilet Use Score 5   Transfers  (Bed/Chair) Score 10   Mobility (Level Surface) Score 0   Stairs Score 0   Barthel Index Score 50     Cassie Plunkett OTR/L   [place] License # [id number]  [place] License # [id number]

## 2025-07-01 NOTE — ASSESSMENT & PLAN NOTE
Evidenced by tachycardia, tachypnea, and leukopenia  Tachycardia in setting of atrial fibrillation/alcohol withdrawal and has chronic decreased WBC  Likely reactive due to possible aspiration, withdrawal  Procalcitonin negative  Follow up blood cultures  Continue rocephin for now

## 2025-07-01 NOTE — ASSESSMENT & PLAN NOTE
A fib with RVR on presentation, not compliant with home medications  Resume home Cardizem 120 mg BID and Toprol  mg daily  Continue Eliquis  HR elevated this morning up to 170s, improved after home medications and 5 mg IV lopressor x 1  Monitor on telemetry

## 2025-07-01 NOTE — SPEECH THERAPY NOTE
Speech-Language Pathology Bedside Swallow Evaluation      Patient Name: Gregg Partida    Today's Date: 7/1/2025     Problem List  Principal Problem:    Acute respiratory failure with hypoxia (HCC)  Active Problems:    CAD (coronary artery disease)    Elevated alkaline phosphatase    SIRS (systemic inflammatory response syndrome) (HCC)    Atrial fibrillation (HCC)    Essential hypertension    COPD (chronic obstructive pulmonary disease) (HCC)    Pancytopenia (HCC)    Suspected aspiration episode    Alcohol intoxication (HCC)    History of Clostridium difficile infection      Past Medical History  Past Medical History[1]    Past Surgical History  Past Surgical History[2]    Summary   Pt presented with functional appearing oral and pharyngeal stage swallowing skills with materials administered today.    Recommended Diet: regular textured food and thin liquids   Recommended Form of Meds: as desired and tolerated  Aspiration precautions and swallowing strategies: upright posture and only feed when fully alert  Other Recommendations: Continue frequent oral care        Current Medical Status  Gregg Partida is a 64 yo M c PMH as stated above (including frequently in ER for alcohol intoxication).  He presented 6/29 pm with complaints of shortness of breath, coughing and weakness..  In the ED, he did meet SIRS criteria with questionable right-sided infiltrate on CXR imaging.  At the time my encounter, he is sluggish due to alcohol intoxication.  DX:  Acute respiratory failure with hypoxia (HCC)  Occasionally recurrent episode due to chronic alcoholism and suspected aspiration episodes (see below)  On respiratory and airway clearance protocols  SIRS (systemic inflammatory response syndrome) (HCC)  On presentation with tachycardia/tachypnea and leukopenia (although decreased WBC count is more long-term)  Likely reactive due to aspiration episode (see below)  Unclear whether true infectious etiology -> pending procalcitonin  and blood cultures  Monitor vitals and maintain hemodynamics  Suspected aspiration episode In the setting of alcohol intoxication  Pancytopenia (HCC) Likely sequela of chronic alcoholism.  Monitor CBC, and transfuse if hemoglobin/platelet thresholds are met  Alcohol intoxication (HCC)  [facility] protocol  History of Clostridium difficile infection  CAD, afib, COPD hx.     SLP Evaluation requested and completed bedside.    Current Precautions:  Fall  Aspiration MSSP pt    Allergies:  No known food allergies    Past medical history:  Please see H&P for details    Special Studies: of 6/30  CXR:  Mild central pulmonary vascular congestion.  Left pleural fluid.  Left seventh rib fracture.  No visible pneumothorax    CT:    1.  No acute intracranial abnormality of head/brain.  2.  No acute cervical spine fracture or traumatic malalignment.  3.  Extensive postsurgical changes in the cervical spine (from C4-C7 ACDF and posterior decompression at C3-C7) with a posterior paraspinal fluid collection spanning the surgical levels.   This is unchanged from prior exam.    Social/Education/Vocational Hx:  Pt lives alone    Swallow Information   Current Risks for Dysphagia & Aspiration: CIWA protocol, AMS  Current Diet: on liquids this am, diet just advanced to regular texture and pt assessed with 1st po food.  Baseline Diet: regular diet and thin liquids      Baseline Assessment   Behavior/Cognition: alert  Speech/Language Status: able to follow commands and express basic needs  Patient Positioning: upright in bed  Pain Status/Interventions/Response to Interventions:  No report of or nonverbal indications of pain.       Swallow Mechanism Exam  Facial: symmetrical  Labial: WFL  Lingual: WFL  Velum: unable to visualize  Mandible: adequate ROM  Dentition: partial  Vocal quality:clear/adequate   Respiratory Status: on 2-3 L O2     Consistencies Assessed and Performance   Materials administered included thin liquid (indv and successive  sips), mashed potatoes/meatloaf/broccoli    Oral Stage: WFL appearing  Mastication was adequate with the materials administered today.  Bolus formation and transfer were functional with no significant oral residue noted.  No overt s/s reduced oral control.    Pharyngeal Stage: WFL appearing  Swallow Mechanics:  Swallowing initiation appeared prompt.  Laryngeal rise was palpated and judged to be within functional limits.  No coughing, throat clearing, change in vocal quality or respiratory status noted today.     Esophageal Concerns: none reported    Summary and Recommendations (see above)    Results Reviewed with: patient and RN     Treatment Recommended: None at this time     Thank you for this referral.  Please do not hesitate to contact me with any questions or concerns.    Balbina Bautista MS,SLP  [place] License [id number]  [place] License [id number]         [1]   Past Medical History:  Diagnosis Date    Acute encephalopathy 06/07/2025    Acute on chronic kidney failure  (HCC)     Alcohol abuse     Alcohol withdrawal (HCC) 10/09/2018    Atrial fibrillation (HCC)     Cancer (HCC)     prostate ca,had radiation    Cardiac disease     stents,then triple bypass    COPD (chronic obstructive pulmonary disease) (HCC)     Coronary artery disease     Elevated troponin 09/28/2023    ETOH abuse     Heart failure (HCC)     History of heart surgery     says triple bypass [facility]    Hx of heart artery stent     2014    Hyperlipidemia     Hypertension     Hypoglycemia 06/05/2025    Hyponatremia 10/17/2019    Hypovolemic shock ([facility]) 12/22/2019    Increased anion gap 04/21/2021    Lumbar spondylitis (HCC) 10/13/2022    Metabolic acidosis, increased anion gap 04/21/2021    Nasal bone fracture 10/10/2022    Prostate CA (HCC)     S/P CABG x 3     2004    Sleep apnea    [2]   Past Surgical History:  Procedure Laterality Date    CARDIAC CATHETERIZATION      2 stents    CORONARY ARTERY BYPASS GRAFT      CORONARY ARTERY BYPASS GRAFT   2004    FL ARTHRD ANT INTERBODY MIN DSC CRV BELOW C2 N/A 12/16/2020    Procedure: Anterior cervical discectomy with fusion C4-C7; Posterior cervical decompression and fusion C2-T2;  Surgeon: David Rowell MD;  Location: [facility];  Service: Neurosurgery    TONSILLECTOMY

## 2025-07-02 LAB
ALBUMIN SERPL BCG-MCNC: 3.1 G/DL (ref 3.5–5)
ALP SERPL-CCNC: 123 U/L (ref 34–104)
ALT SERPL W P-5'-P-CCNC: 20 U/L (ref 7–52)
ANION GAP SERPL CALCULATED.3IONS-SCNC: 9 MMOL/L (ref 4–13)
AST SERPL W P-5'-P-CCNC: 43 U/L (ref 13–39)
BASOPHILS # BLD AUTO: 0.02 THOUSANDS/ÂΜL (ref 0–0.1)
BASOPHILS NFR BLD AUTO: 1 % (ref 0–1)
BILIRUB SERPL-MCNC: 0.89 MG/DL (ref 0.2–1)
BUN SERPL-MCNC: 15 MG/DL (ref 5–25)
CALCIUM ALBUM COR SERPL-MCNC: 8.7 MG/DL (ref 8.3–10.1)
CALCIUM SERPL-MCNC: 8 MG/DL (ref 8.4–10.2)
CHLORIDE SERPL-SCNC: 98 MMOL/L (ref 96–108)
CO2 SERPL-SCNC: 28 MMOL/L (ref 21–32)
CREAT SERPL-MCNC: 0.94 MG/DL (ref 0.6–1.3)
EOSINOPHIL # BLD AUTO: 0.13 THOUSAND/ÂΜL (ref 0–0.61)
EOSINOPHIL NFR BLD AUTO: 4 % (ref 0–6)
ERYTHROCYTE [DISTWIDTH] IN BLOOD BY AUTOMATED COUNT: 17.7 % (ref 11.6–15.1)
GFR SERPL CREATININE-BSD FRML MDRD: 85 ML/MIN/1.73SQ M
GLUCOSE SERPL-MCNC: 100 MG/DL (ref 65–140)
HCT VFR BLD AUTO: 30.1 % (ref 36.5–49.3)
HGB BLD-MCNC: 9.7 G/DL (ref 12–17)
IMM GRANULOCYTES # BLD AUTO: 0.01 THOUSAND/UL (ref 0–0.2)
IMM GRANULOCYTES NFR BLD AUTO: 0 % (ref 0–2)
LYMPHOCYTES # BLD AUTO: 1.07 THOUSANDS/ÂΜL (ref 0.6–4.47)
LYMPHOCYTES NFR BLD AUTO: 32 % (ref 14–44)
MAGNESIUM SERPL-MCNC: 1.4 MG/DL (ref 1.9–2.7)
MCH RBC QN AUTO: 31 PG (ref 26.8–34.3)
MCHC RBC AUTO-ENTMCNC: 32.2 G/DL (ref 31.4–37.4)
MCV RBC AUTO: 96 FL (ref 82–98)
MONOCYTES # BLD AUTO: 0.41 THOUSAND/ÂΜL (ref 0.17–1.22)
MONOCYTES NFR BLD AUTO: 12 % (ref 4–12)
NEUTROPHILS # BLD AUTO: 1.66 THOUSANDS/ÂΜL (ref 1.85–7.62)
NEUTS SEG NFR BLD AUTO: 51 % (ref 43–75)
NRBC BLD AUTO-RTO: 0 /100 WBCS
PHOSPHATE SERPL-MCNC: 2.4 MG/DL (ref 2.3–4.1)
PLATELET # BLD AUTO: 38 THOUSANDS/UL (ref 149–390)
PMV BLD AUTO: 11.6 FL (ref 8.9–12.7)
POTASSIUM SERPL-SCNC: 3.6 MMOL/L (ref 3.5–5.3)
PROT SERPL-MCNC: 6.3 G/DL (ref 6.4–8.4)
RBC # BLD AUTO: 3.13 MILLION/UL (ref 3.88–5.62)
SODIUM SERPL-SCNC: 135 MMOL/L (ref 135–147)
WBC # BLD AUTO: 3.3 THOUSAND/UL (ref 4.31–10.16)

## 2025-07-02 PROCEDURE — 85025 COMPLETE CBC W/AUTO DIFF WBC: CPT | Performed by: INTERNAL MEDICINE

## 2025-07-02 PROCEDURE — 97110 THERAPEUTIC EXERCISES: CPT

## 2025-07-02 PROCEDURE — 93005 ELECTROCARDIOGRAM TRACING: CPT

## 2025-07-02 PROCEDURE — 97535 SELF CARE MNGMENT TRAINING: CPT

## 2025-07-02 PROCEDURE — 84100 ASSAY OF PHOSPHORUS: CPT | Performed by: INTERNAL MEDICINE

## 2025-07-02 PROCEDURE — 83735 ASSAY OF MAGNESIUM: CPT | Performed by: INTERNAL MEDICINE

## 2025-07-02 PROCEDURE — 99232 SBSQ HOSP IP/OBS MODERATE 35: CPT | Performed by: NURSE PRACTITIONER

## 2025-07-02 PROCEDURE — 80053 COMPREHEN METABOLIC PANEL: CPT | Performed by: INTERNAL MEDICINE

## 2025-07-02 RX ORDER — METOPROLOL TARTRATE 1 MG/ML
5 INJECTION, SOLUTION INTRAVENOUS ONCE
Status: COMPLETED | OUTPATIENT
Start: 2025-07-02 | End: 2025-07-02

## 2025-07-02 RX ORDER — MAGNESIUM SULFATE HEPTAHYDRATE 40 MG/ML
2 INJECTION, SOLUTION INTRAVENOUS ONCE
Status: COMPLETED | OUTPATIENT
Start: 2025-07-02 | End: 2025-07-02

## 2025-07-02 RX ORDER — LORAZEPAM 1 MG/1
2 TABLET ORAL ONCE
Status: COMPLETED | OUTPATIENT
Start: 2025-07-02 | End: 2025-07-02

## 2025-07-02 RX ORDER — LORAZEPAM 2 MG/ML
4 INJECTION INTRAMUSCULAR ONCE
Status: COMPLETED | OUTPATIENT
Start: 2025-07-02 | End: 2025-07-02

## 2025-07-02 RX ORDER — TOBRAMYCIN AND DEXAMETHASONE 3; 1 MG/ML; MG/ML
1 SUSPENSION/ DROPS OPHTHALMIC EVERY 6 HOURS SCHEDULED
Status: DISCONTINUED | OUTPATIENT
Start: 2025-07-02 | End: 2025-07-04 | Stop reason: HOSPADM

## 2025-07-02 RX ADMIN — FOLIC ACID 1 MG: 1 TABLET ORAL at 08:40

## 2025-07-02 RX ADMIN — Medication 125 MG: at 20:47

## 2025-07-02 RX ADMIN — B-COMPLEX W/ C & FOLIC ACID TAB 1 TABLET: TAB at 08:39

## 2025-07-02 RX ADMIN — MAGNESIUM SULFATE HEPTAHYDRATE 2 G: 40 INJECTION, SOLUTION INTRAVENOUS at 09:14

## 2025-07-02 RX ADMIN — CEFTRIAXONE 1000 MG: 1 INJECTION, SOLUTION INTRAVENOUS at 18:28

## 2025-07-02 RX ADMIN — RANOLAZINE 500 MG: 500 TABLET, FILM COATED, EXTENDED RELEASE ORAL at 08:40

## 2025-07-02 RX ADMIN — DILTIAZEM HYDROCHLORIDE 120 MG: 60 CAPSULE, EXTENDED RELEASE ORAL at 18:27

## 2025-07-02 RX ADMIN — THIAMINE HCL TAB 100 MG 100 MG: 100 TAB at 08:39

## 2025-07-02 RX ADMIN — RANOLAZINE 500 MG: 500 TABLET, FILM COATED, EXTENDED RELEASE ORAL at 18:25

## 2025-07-02 RX ADMIN — Medication 250 MG: at 08:39

## 2025-07-02 RX ADMIN — METOROPROLOL TARTRATE 5 MG: 5 INJECTION, SOLUTION INTRAVENOUS at 09:12

## 2025-07-02 RX ADMIN — FLUTICASONE FUROATE AND VILANTEROL TRIFENATATE 1 PUFF: 200; 25 POWDER RESPIRATORY (INHALATION) at 08:46

## 2025-07-02 RX ADMIN — TAMSULOSIN HYDROCHLORIDE 0.4 MG: 0.4 CAPSULE ORAL at 15:53

## 2025-07-02 RX ADMIN — Medication 250 MG: at 18:25

## 2025-07-02 RX ADMIN — Medication 125 MG: at 08:48

## 2025-07-02 RX ADMIN — TOBRAMYCIN AND DEXAMETHASONE 1 DROP: 1; 3 SUSPENSION/ DROPS OPHTHALMIC at 18:31

## 2025-07-02 RX ADMIN — ASPIRIN 81 MG: 81 TABLET, CHEWABLE ORAL at 08:39

## 2025-07-02 RX ADMIN — GABAPENTIN 300 MG: 300 CAPSULE ORAL at 20:48

## 2025-07-02 RX ADMIN — LORAZEPAM 4 MG: 2 INJECTION INTRAMUSCULAR; INTRAVENOUS at 20:47

## 2025-07-02 RX ADMIN — PANTOPRAZOLE SODIUM 40 MG: 40 TABLET, DELAYED RELEASE ORAL at 08:39

## 2025-07-02 RX ADMIN — LORAZEPAM 2 MG: 1 TABLET ORAL at 18:24

## 2025-07-02 RX ADMIN — APIXABAN 5 MG: 5 TABLET, FILM COATED ORAL at 18:25

## 2025-07-02 RX ADMIN — GABAPENTIN 300 MG: 300 CAPSULE ORAL at 08:39

## 2025-07-02 RX ADMIN — TOBRAMYCIN AND DEXAMETHASONE 1 DROP: 1; 3 SUSPENSION/ DROPS OPHTHALMIC at 11:10

## 2025-07-02 RX ADMIN — METOPROLOL SUCCINATE 100 MG: 50 TABLET, EXTENDED RELEASE ORAL at 08:40

## 2025-07-02 RX ADMIN — FERROUS SULFATE TAB 325 MG (65 MG ELEMENTAL FE) 325 MG: 325 (65 FE) TAB at 08:40

## 2025-07-02 RX ADMIN — DILTIAZEM HYDROCHLORIDE 120 MG: 60 CAPSULE, EXTENDED RELEASE ORAL at 08:40

## 2025-07-02 RX ADMIN — ATORVASTATIN CALCIUM 40 MG: 40 TABLET, FILM COATED ORAL at 15:53

## 2025-07-02 RX ADMIN — LORAZEPAM 2 MG: 1 TABLET ORAL at 14:22

## 2025-07-02 RX ADMIN — GABAPENTIN 300 MG: 300 CAPSULE ORAL at 15:53

## 2025-07-02 RX ADMIN — APIXABAN 5 MG: 5 TABLET, FILM COATED ORAL at 08:40

## 2025-07-02 NOTE — ASSESSMENT & PLAN NOTE
Patient with history of alcoholism and frequent ED visits presented to ED due to weakness, noted to be hypoxic in ED requiring 4 L nc.  CXR: Mild central pulmonary vascular congestion. Left pleural fluid. Left seventh rib fracture. No visible pneumothorax.  Concern for recurrent aspiration given chronic alcoholism  Procalcitonin negative x 2, afebrile, mild leukopenia  Started on ceftriaxone current day #3, continue for now pending blood cultures  Follow up urine antigens and sputum culture if able  Speech consulted  Trial dose of IV lasix 20 mg x 1  Xopenex nebs PRN  Currently stable on room air

## 2025-07-02 NOTE — ASSESSMENT & PLAN NOTE
Continue prophylactic oral vancomycin while on abx and 72 hours beyond   Alert and interactive, no focal deficits

## 2025-07-02 NOTE — PHYSICAL THERAPY NOTE
PT Cancellation Note       07/02/25 1403   Note Type   Note type Cancelled Session   Cancel Reasons Refusal   Additional Comments Attempted to see pt this afternoon for PT evaluation. Pt declined, reports he wants medication for withdrawl/wants to see the RN prior to participating. Will follow-up as schedule allows.   Licensure   [place] License Number  Ursula Anne [id number]

## 2025-07-02 NOTE — OCCUPATIONAL THERAPY NOTE
OT TREATMENT       07/02/25 1105   OT Last Visit   OT Visit Date 07/02/25   Note Type   Note Type Treatment   Pain Assessment   Pain Assessment Tool 0-10   Pain Score No Pain   Restrictions/Precautions   Other Precautions Impulsive;Chair Alarm;Bed Alarm;Fall Risk   ADL   Eating Assistance 7  Independent   Grooming Assistance 4  Minimal Assistance   Grooming Deficit Setup;Steadying;Verbal cueing;Supervision/safety;Increased time to complete;Wash/dry hands;Wash/dry face;Teeth care;Brushing hair   Grooming Comments standing and sitting by bathroom sink   Toileting Assistance  4  Minimal Assistance   Toileting Deficit Steadying;Verbal cueing;Supervison/safety;Grab bar use;Clothing management up   Functional Standing Tolerance   Time 3 consecutive minutes max x 3   Activity grooming at sink, ambulation   Transfers   Sit to Stand 5  Supervision   Additional items Verbal cues  (with RW)   Stand to Sit 4  Minimal assistance   Additional items Assist x 1;Verbal cues;Impulsive   Stand pivot 4  Minimal assistance   Additional items Assist x 1;Verbal cues;Impulsive  (with RW)   Functional Mobility   Functional Mobility 4  Minimal assistance   Additional Comments 40 feet and 100 feet   Additional items Rolling walker   Toilet Transfers   Toilet Transfer From   (chair)   Toilet Transfer Type To and from   Toilet Transfer to Standard toilet   Toilet Transfer Technique Ambulating   Toilet Transfers Minimal assistance;Verbal cues   Toilet Transfers Comments with RW   Therapeutic Excerise-Strength   UE Strength Yes   Right Upper Extremity- Strength   R Shoulder Flexion;Extension;ABduction   R Elbow Elbow flexion;Elbow extension   R Wrist Wrist flexion;Wrist extension   R Position Seated   R Weight/Reps/Sets 10 reps each   Left Upper Extremity-Strength   L Shoulder Flexion;Extension;ABduction   L Elbow Elbow flexion;Elbow extension   L Wrist Wrist flexion;Wrist extension   L Position Seated   LUE Strength Comment 10 reps each  "  Subjective   Subjective \"I think I have clogged arteries in my legs. I want them to check it out downstairs.\"   Cognition   Overall Cognitive Status Impaired   Arousal/Participation Alert   Attention Attends with cues to redirect   Orientation Level Oriented X4   Memory Decreased recall of precautions   Following Commands Follows multistep commands inconsistently   Comments pt is impulsive with poor safety awareness   Activity Tolerance   Activity Tolerance Patient limited by fatigue   Assessment   Assessment Pt seen for ADL training. Education and training with teachback method with pt regarding energy conservation/proper body mechanics during ADLS and functional mobility to decrease fall risks, decrease signs of fatigue and increase stamina needed to return to prior level of function. Pt is impulsive with poor safety awareness and will need repeated teaching to improve compliance. Pt is especially resistant to following safe techniques when returning to a seated position as he keeps walker to far to the side of himself and tries to sit too far away from surface.  Pt demonstrating increased balance and functional activity tolerance allowing for increased active participation with ADLS and functional mobility tasks.  Patient left OOB in chair with all needs within reach and tab alarm in place. Nursing staff encouraged to have pt walk to the bathroom vs using urinal or male purewick. Continue OT per POC.   The patient's raw score on the AM-PAC Daily Activity Inpatient Short Form is 17. A raw score of less than 19 suggests the patient may benefit from discharge to post-acute rehabilitation services. Recommend Level III (Minimum Resource Intensity).   Plan   Treatment Interventions ADL retraining;Functional transfer training;UE strengthening/ROM;Endurance training;Patient/family training;Equipment evaluation/education;Activityengagement;Compensatory technique education;Continued evaluation;Energy conservation   OT " Frequency 3-5x/wk   Discharge Recommendation   Rehab Resource Intensity Level, OT III (Minimum Resource Intensity)   AM-PAC Daily Activity Inpatient   Lower Body Dressing 2   Bathing 2   Toileting 3   Upper Body Dressing 3   Grooming 3   Eating 4   Daily Activity Raw Score 17   Daily Activity Standardized Score (Calc for Raw Score >=11) 37.26   AM-PAC Applied Cognition Inpatient   Following a Speech/Presentation 3   Understanding Ordinary Conversation 4   Taking Medications 2   Remembering Where Things Are Placed or Put Away 3   Remembering List of 4-5 Errands 2   Taking Care of Complicated Tasks 2   Applied Cognition Raw Score 16   Applied Cognition Standardized Score 35.03   End of Consult   Education Provided Yes   Patient Position at End of Consult Bedside chair;Bed/Chair alarm activated;All needs within reach   Nurse Communication Nurse aware of consult   Licensure   [place] License Number  Ya Estevez MS, OTR/L, [place] Lic# [id number]

## 2025-07-02 NOTE — PLAN OF CARE
Problem: Potential for Falls  Goal: Patient will remain free of falls  Description: INTERVENTIONS:  - Educate patient/family on patient safety including physical limitations  - Instruct patient to call for assistance with activity   - Consider consulting OT/PT to assist with strengthening/mobility based on AM PAC & JH-HLM score  - Consult OT/PT to assist with strengthening/mobility   - Keep Call bell within reach  - Keep bed low and locked with side rails adjusted as appropriate  - Keep care items and personal belongings within reach  - Initiate and maintain comfort rounds  - Make Fall Risk Sign visible to staff  - Offer Toileting every 2 Hours, in advance of need  - Initiate/Maintain bed/chair alarm  - Apply yellow socks and bracelet for high fall risk patients  - Consider moving patient to room near nurses station  Outcome: Progressing     Problem: RESPIRATORY - ADULT  Goal: Achieves optimal ventilation and oxygenation  Description: INTERVENTIONS:  - Assess for changes in respiratory status  - Assess for changes in mentation and behavior  - Position to facilitate oxygenation and minimize respiratory effort  - Oxygen administered by appropriate delivery if ordered  - Initiate smoking cessation education as indicated  - Encourage broncho-pulmonary hygiene including cough, deep breathe, Incentive Spirometry  - Assess the need for suctioning and aspirate as needed  - Assess and instruct to report SOB or any respiratory difficulty  - Respiratory Therapy support as indicated  Outcome: Progressing     Problem: Nutrition/Hydration-ADULT  Goal: Nutrient/Hydration intake appropriate for improving, restoring or maintaining nutritional needs  Description: Monitor and assess patient's nutrition/hydration status for malnutrition. Collaborate with interdisciplinary team and initiate plan and interventions as ordered.  Monitor patient's weight and dietary intake as ordered or per policy. Utilize nutrition screening tool and  intervene as necessary. Determine patient's food preferences and provide high-protein, high-caloric foods as appropriate.     INTERVENTIONS:  - Monitor oral intake, urinary output, labs, and treatment plans  - Assess nutrition and hydration status and recommend course of action  - Evaluate amount of meals eaten  - Assist patient with eating if necessary   - Allow adequate time for meals  - Recommend/ encourage appropriate diets, oral nutritional supplements, and vitamin/mineral supplements  - Order, calculate, and assess calorie counts as needed  - Recommend, monitor, and adjust tube feedings and TPN/PPN based on assessed needs  - Assess need for intravenous fluids  - Provide specific nutrition/hydration education as appropriate  - Include patient/family/caregiver in decisions related to nutrition  Outcome: Progressing     Problem: Prexisting or High Potential for Compromised Skin Integrity  Goal: Skin integrity is maintained or improved  Description: INTERVENTIONS:  - Identify patients at risk for skin breakdown  - Assess and monitor skin integrity including under and around medical devices   - Assess and monitor nutrition and hydration status  - Monitor labs  - Assess for incontinence   - Turn and reposition patient  - Assist with mobility/ambulation  - Relieve pressure over eduardo prominences   - Avoid friction and shearing  - Provide appropriate hygiene as needed including keeping skin clean and dry  - Evaluate need for skin moisturizer/barrier cream  - Collaborate with interdisciplinary team  - Patient/family teaching  - Consider wound care consult    Assess:  - Review Herrera scale daily  - Clean and moisturize skin daily  - Inspect skin when repositioning, toileting, and assisting with ADLS  - Assess extremities for adequate circulation and sensation     Bed Management:  - Have minimal linens on bed & keep smooth, unwrinkled  - Change linens as needed when moist or perspiring  - Toileting:  Activity:  -  Encourage activity and walks on unit  - Encourage or provide ROM exercises   Skin Care:  - Avoid use of baby powder, tape, friction and shearing, hot water or constrictive clothing  - Relieve pressure over bony prominences using seat cushion  - Do not massage red bony areas    Outcome: Progressing

## 2025-07-02 NOTE — PROGRESS NOTES
Progress Note - Hospitalist   Name: Gregg Partida 65 y.o. male I MRN: [id number]  Unit/Bed#: 2 [address] Date of Admission: 6/30/2025   Date of Service: 7/2/2025 I Hospital Day: 2    Assessment & Plan  Acute respiratory failure with hypoxia (HCC)  Patient with history of alcoholism and frequent ED visits presented to ED due to weakness, noted to be hypoxic in ED requiring 4 L nc.  CXR: Mild central pulmonary vascular congestion. Left pleural fluid. Left seventh rib fracture. No visible pneumothorax.  Concern for recurrent aspiration given chronic alcoholism  Procalcitonin negative x 2, afebrile, mild leukopenia  Started on ceftriaxone current day #3, continue for now pending blood cultures  Follow up urine antigens and sputum culture if able  Speech consulted  Trial dose of IV lasix 20 mg x 1  Xopenex nebs PRN  Currently stable on room air  Atrial fibrillation (HCC)  A fib with RVR on presentation, not compliant with home medications  Resume home Cardizem 120 mg BID and Toprol  mg daily  Continue Eliquis  HR elevated this morning up to 170s, improved after home medications and 5 mg IV lopressor x 1; rate currently 80-90s  Monitor on telemetry  SIRS (systemic inflammatory response syndrome) (HCC)  Evidenced by tachycardia, tachypnea, and leukopenia  Tachycardia in setting of atrial fibrillation/alcohol withdrawal and has chronic decreased WBC  Likely reactive due to possible aspiration, withdrawal  Procalcitonin negative  Follow up blood cultures; 1/2 staph epidermidis; other clear  Continue rocephin for now  Pancytopenia (HCC)  Likely sequela of chronic alcoholism  trend CBC  Alcohol intoxication (HCC)  Frequent ED visits/admissions due to alcohol intoxication and withdrawal  Continue CIWA protocol  Thiamine, folic acid, multivitamin  Replete electrolytes as needed  History of Clostridium difficile infection  Continue prophylactic oral vancomycin while on abx and 72 hours beyond  Elevated alkaline  phosphatase  In the setting of chronic alcoholism  Limit/avoid hepatotoxins as possible  CAD (coronary artery disease)  Status post prior CABG  Continue ASA/statin/beta blocker  COPD (chronic obstructive pulmonary disease) (HCC)  Continue IC/LABA maintenance regimen - PRN Xopenex nebulization  Essential hypertension  Continue home Toprol XL and Cardizem  BP currently stable    VTE Pharmacologic Prophylaxis: VTE Score: 4 Moderate Risk (Score 3-4) - Pharmacological DVT Prophylaxis Ordered: apixaban (Eliquis).    Mobility:   Basic Mobility Inpatient Raw Score: 15  JH-HLM Goal: 4: Move to chair/commode  JH-HLM Achieved: 4: Move to chair/commode  JH-HLM Goal achieved. Continue to encourage appropriate mobility.    Patient Centered Rounds: I performed bedside rounds with nursing staff today.   Discussions with Specialists or Other Care Team Provider: multidisciplinary team     Education and Discussions with Family / Patient: Attempted to update  (son) via phone. Left voicemail.     Current Length of Stay: 2 day(s)  Current Patient Status: Inpatient   Certification Statement: The patient will continue to require additional inpatient hospital stay due to biotics, repeat labs, PT/OT  Discharge Plan: Anticipate discharge in 24-48 hrs to discharge location to be determined pending rehab evaluations.    Code Status: Level 1 - Full Code    Subjective   Sitting up in bed, nursing staff are working on trying to get a new IV in him.  Patient had been eating breakfast, no nausea or vomiting.  Denies any fevers or chills, no coughing.  He does demonstrate some mild tremors of bilateral hands when outstretched.    Objective :  Temp:  [98.1 °F (36.7 °C)-99.3 °F (37.4 °C)] 98.8 °F (37.1 °C)  HR:  [] 93  BP: (102-149)/(60-94) 104/60  Resp:  [16-20] 18  SpO2:  [89 %-97 %] 95 %  O2 Device: None (Room air)    Body mass index is 22.3 kg/m².     Input and Output Summary (last 24 hours):     Intake/Output Summary (Last 24  hours) at 7/2/2025 1413  Last data filed at 7/2/2025 0630  Gross per 24 hour   Intake --   Output 800 ml   Net -800 ml       Physical Exam  Vitals and nursing note reviewed.   Constitutional:       General: He is not in acute distress.     Comments: Unkempt looking patient sitting up in bed    HENT:      Nose: Nose normal.      Mouth/Throat:      Mouth: Mucous membranes are moist.     Eyes:      General:         Right eye: Discharge present.         Left eye: Discharge present.      Cardiovascular:      Rate and Rhythm: Tachycardia present. Rhythm irregular.      Pulses: Normal pulses.      Heart sounds: Murmur heard.   Pulmonary:      Comments: Diminished at bases   Abdominal:      General: There is no distension.      Palpations: Abdomen is soft.      Tenderness: There is no abdominal tenderness.   Genitourinary:     Comments: Voiding spontaneously     Musculoskeletal:         General: Normal range of motion.      Cervical back: Normal range of motion.      Right lower leg: No edema.      Left lower leg: No edema.      Comments: Ambulated with walker to BR     Skin:     Capillary Refill: Capillary refill takes less than 2 seconds.      Findings: Bruising present.      Comments: Scratches noted on trunk, arms      Neurological:      General: No focal deficit present.      Mental Status: He is oriented to person, place, and time.     Psychiatric:         Mood and Affect: Mood normal.         Behavior: Behavior normal.      Comments: Cooperative with exam            Lines/Drains:              Lab Results: I have reviewed the following results:   Results from last 7 days   Lab Units 07/02/25  0637   WBC Thousand/uL 3.30*   HEMOGLOBIN g/dL 9.7*   HEMATOCRIT % 30.1*   PLATELETS Thousands/uL 38*   SEGS PCT % 51   LYMPHO PCT % 32   MONO PCT % 12   EOS PCT % 4     Results from last 7 days   Lab Units 07/02/25  0637   SODIUM mmol/L 135   POTASSIUM mmol/L 3.6   CHLORIDE mmol/L 98   CO2 mmol/L 28   BUN mg/dL 15   CREATININE  mg/dL 0.94   ANION GAP mmol/L 9   CALCIUM mg/dL 8.0*   ALBUMIN g/dL 3.1*   TOTAL BILIRUBIN mg/dL 0.89   ALK PHOS U/L 123*   ALT U/L 20   AST U/L 43*   GLUCOSE RANDOM mg/dL 100     Results from last 7 days   Lab Units 06/30/25  1725   INR  1.05             Results from last 7 days   Lab Units 07/01/25  0443 06/30/25  1725   LACTIC ACID mmol/L  --  1.3   PROCALCITONIN ng/ml 0.07 0.09       Recent Cultures (last 7 days):   Results from last 7 days   Lab Units 06/30/25  1725   BLOOD CULTURE  Staphylococcus epidermidis*  No Growth at 24 hrs.   GRAM STAIN RESULT  Gram positive cocci in clusters*       Imaging Results Review: I reviewed radiology reports from this admission including: chest xray.  Other Study Results Review: No additional pertinent studies reviewed.    Last 24 Hours Medication List:     Current Facility-Administered Medications:     acetaminophen (TYLENOL) tablet 650 mg, Q6H PRN    apixaban (ELIQUIS) tablet 5 mg, BID    aspirin chewable tablet 81 mg, Daily    atorvastatin (LIPITOR) tablet 40 mg, Daily With Dinner    cefTRIAXone (ROCEPHIN) IVPB (premix in dextrose) 1,000 mg 50 mL, Once    cefTRIAXone (ROCEPHIN) IVPB (premix in dextrose) 1,000 mg 50 mL, Q24H, Last Rate: 1,000 mg (07/01/25 1839)    dextromethorphan-guaiFENesin (ROBITUSSIN DM) oral syrup 10 mL, Q4H PRN    diltiazem (CARDIZEM SR) 12 hr capsule 120 mg, BID    ferrous sulfate tablet 325 mg, Daily With Breakfast    fluticasone-vilanterol 200-25 mcg/actuation 1 puff, Daily    folic acid (FOLVITE) tablet 1 mg, Daily    gabapentin (NEURONTIN) capsule 300 mg, TID    hydrALAZINE (APRESOLINE) injection 5 mg, Q6H PRN    levalbuterol (XOPENEX) inhalation solution 1.25 mg, Q8H PRN **AND** [DISCONTINUED] sodium chloride 0.9 % inhalation solution 3 mL, Q6H PRN    metoprolol succinate (TOPROL-XL) 24 hr tablet 100 mg, Daily    multivitamin stress formula tablet 1 tablet, Daily    pantoprazole (PROTONIX) EC tablet 40 mg, Daily    ranolazine (RANEXA) 12 hr  tablet 500 mg, BID    saccharomyces boulardii (FLORASTOR) capsule 250 mg, BID    tamsulosin (FLOMAX) capsule 0.4 mg, Daily With Dinner    thiamine tablet 100 mg, Daily    tobramycin-dexamethasone (TOBRADEX) 0.3-0.1 % ophthalmic suspension 1 drop, Q6H SEDA    trimethobenzamide (TIGAN) IM injection 200 mg, Q6H PRN    vancomycin (VANCOCIN) oral solution 125 mg, Q12H SEDA    Administrative Statements   Today, Patient Was Seen By: CHELSEA Hyde  I have spent a total time of greater than 45 minutes in caring for this patient on the day of the visit/encounter including Diagnostic results, Instructions for management, Risk factor reductions, Impressions, Counseling / Coordination of care, Documenting in the medical record, Reviewing/placing orders in the medical record (including tests, medications, and/or procedures), Obtaining or reviewing history  , and Communicating with other healthcare professionals .    **Please Note: This note may have been constructed using a voice recognition system.**

## 2025-07-02 NOTE — DISCHARGE INSTR - OTHER ORDERS
Wound Care Plan  1-Apply VASHE soaked gauze to left arm wounds (proximal and distal) x 5 minutes and pat dry. Please leave steristrips in place to proximal wound. If they fall off, apply 3M No Sting and reapply steristrips. Apply Adaptic then Melgisorb Ag or equivalent to (proximal & distal) wounds cut to size of wounds, ABD, rolled gauze and tape. Change 3x/week and as needed for soilage/dislodgement.  2-Apply VASHE soaked gauze to right arm wound cluster x 5 minutes and pat dry. Apply Betadine to wounds, ABD, rolled gauze and tape. Change 3x/week and as needed for soilage/dislodgement.  3-Apply VASHE soaked gauze to left sacrobuttock wound & pat dry. Apply thin layer of Triad paste 2x/day and as needed for soilage/dislodgement.  4-Apply Prevent barrier cream or equivalent to bilateral heels, left posterior lower leg healed wound and intact sacrobuttock areas 2x/day and as needed.  5-Float heels on 2 pillows in bed to offload pressure so heels are not in contact with mattress or pillows.  6-Use pressure redistribution cushion in chair when out of bed if able. Avoid prolonged sitting.  7-Moisturize skin daily with skin nourishing cream.  8-Turn/reposition every 2 hours and as needed for pressure re-distribution on skin.   9-You will receive a call from Central Scheduling to follow up at [facility].

## 2025-07-02 NOTE — ASSESSMENT & PLAN NOTE
A fib with RVR on presentation, not compliant with home medications  Resume home Cardizem 120 mg BID and Toprol  mg daily  Continue Eliquis  HR elevated this morning up to 170s, improved after home medications and 5 mg IV lopressor x 1; rate currently 80-90s  Monitor on telemetry

## 2025-07-02 NOTE — PLAN OF CARE
Problem: OCCUPATIONAL THERAPY ADULT  Goal: Performs self-care activities at highest level of function for planned discharge setting.  See evaluation for individualized goals.  Description:   Outcome: Progressing  Note: Limitation: Decreased ADL status, Decreased UE strength, Decreased Safe judgement during ADL, Decreased cognition, Decreased endurance, Decreased self-care trans, Decreased high-level ADLs (decreased balance and mobility)  Prognosis: Good  Assessment: Pt seen for ADL training. Education and training with teachback method with pt regarding energy conservation/proper body mechanics during ADLS and functional mobility to decrease fall risks, decrease signs of fatigue and increase stamina needed to return to prior level of function. Pt is impulsive with poor safety awareness and will need repeated teaching to improve compliance. Pt is especially resistant to following safe techniques when returning to a seated position as he keeps walker to far to the side of himself and tries to sit too far away from surface.  Pt demonstrating increased balance and functional activity tolerance allowing for increased active participation with ADLS and functional mobility tasks.  Patient left OOB in chair with all needs within reach and tab alarm in place. Nursing staff encouraged to have pt walk to the bathroom vs using urinal or male purewick. Continue OT per POC.   The patient's raw score on the AM-PAC Daily Activity Inpatient Short Form is 17. A raw score of less than 19 suggests the patient may benefit from discharge to post-acute rehabilitation services. Recommend Level III (Minimum Resource Intensity).     Rehab Resource Intensity Level, OT: III (Minimum Resource Intensity)

## 2025-07-02 NOTE — ASSESSMENT & PLAN NOTE
Evidenced by tachycardia, tachypnea, and leukopenia  Tachycardia in setting of atrial fibrillation/alcohol withdrawal and has chronic decreased WBC  Likely reactive due to possible aspiration, withdrawal  Procalcitonin negative  Follow up blood cultures; 1/2 staph epidermidis; other clear  Continue rocephin for now

## 2025-07-02 NOTE — WOUND OSTOMY CARE
Progress Note - Wound   Gregg Partida 65 y.o. male MRN: [id number]  Unit/Bed#: 2 [place] [address] Encounter: [id number]        Assessment:   This is a 65 year old male patient admitted on 6/30/25 with acute respiratory failure. Patient was AAO x 3 and agreeable to have wound visit done. He ambulates to bathroom with assist and is continent of bowel and bladder.    Assessment Findings:  1-Full thickness traumatic wounds to bilateral lower arms. Orders are in place for wound care. Please see below for detailed assessments. Ambulatory referral to wound care placed by Provider.  2-Unstageable pressure injury to left sacrobuttock present on admission. Orders are in place for wound care and for prevention. Patient may be discharged tomorrow and refused to have a specialty bed ordered.  3-Healed traumatic wound to left posterior lower leg - orders are in place for protection.  4-Bilateral heels are intact - orders are in place for skin care and for prevention.    Wound Care Plan  1-Apply VASHE soaked gauze to left arm wounds (proximal and distal) x 5 minutes and pat dry. Please leave steristrips in place to proximal wound. If they fall off, apply 3M No Sting and reapply steristrips. Apply Adaptic then Melgisorb Ag to (proximal & distal) wounds cut to size of wounds, ABD, rolled gauze and tape. Change every other day & prn soilage/dislodgement.  2-Apply VASHE soaked gauze to right arm wound cluster x 5 minutes and pat dry. Apply Betadine to wounds, ABD, rolled gauze and tape. Change every other day & prn soilage/dislodgement.  3-Apply VASHE soaked gauze to left sacrobuttock wound & pat dry. Apply thin layer of Triad paste 2x/day and prn soilage/dislodgement.  4-Apply Prevent barrier cream to bilateral heels, left posterior lower leg healed wound and intact sacrobuttock areas BID and PRN  5-Float heels on 2 pillows to offload pressure so heels are not in contact with mattress or pillows.  6-Ehob pressure redistribution cushion in  chair when out of bed if able. Avoid prolonged sitting.  7-Moisturize skin daily with skin nourishing cream.  8-Turn/reposition q2h or when medically stable for pressure re-distribution on skin.     Wound 05/26/25 Traumatic Skin Tear Arm Left;Lower;Lateral;Proximal (Active)   Wound Image   07/02/25 1133   Wound Description Granulation tissue;Pink;Slough;Yellow 07/02/25 1133   Non-staged Wound Description Full thickness 07/02/25 1133   Wound Length (cm) 2 cm 07/02/25 1133   Wound Width (cm) 1 cm 07/02/25 1133   Wound Depth (cm) 0.1 cm 07/02/25 1133   Wound Surface Area (cm^2) 1.57 cm^2 07/02/25 1133   Wound Volume (cm^3) 0.105 cm^3 07/02/25 1133   Calculated Wound Volume (cm^3) 0.2 cm^3 07/02/25 1133   Change in Wound Size % 95.6 07/02/25 1133   Drainage Amount Moderate 07/02/25 1133   Drainage Description Sanguineous 07/02/25 1133   Babita-wound Assessment Intact;Hyperpigmented 07/02/25 1133   Treatments Cleansed 07/02/25 1133   Wound Site Closure Approximated;Adhesive bandage 07/02/25 1133   Dressing Adaptic;Calcium Alginate with Silver;ABD;Dry dressing 07/02/25 1133   Dressing Changed New 07/02/25 1133   Dressing Status Clean;Dry;Intact 07/02/25 1133       Wound 06/30/25 Traumatic Arm Right;Posterior (Active)   Wound Image   07/02/25 1135   Wound Description Black;Eschar;Pink;Granulation tissue 07/02/25 1135   Non-staged Wound Description Full thickness 07/02/25 1135   Wound Length (cm) 12 cm 07/02/25 1135   Wound Width (cm) 7 cm 07/02/25 1135   Wound Depth (cm) 0.1 cm 07/02/25 1135   Wound Surface Area (cm^2) 65.97 cm^2 07/02/25 1135   Wound Volume (cm^3) 4.398 cm^3 07/02/25 1135   Calculated Wound Volume (cm^3) 8.4 cm^3 07/02/25 1135   Drainage Amount None 07/02/25 1135   Babita-wound Assessment Intact;Scar Tissue 07/02/25 1135   Treatments Cleansed 07/02/25 1135   Dressing Betadine;ABD;Dry dressing 07/02/25 1135   Dressing Changed New 07/02/25 1135   Dressing Status Clean;Dry;Intact 07/02/25 1135       Wound  07/01/25 Pressure Injury Sacrum Left (Active)   Wound Image   07/02/25 1141   Wound Description Slough;Yellow 07/02/25 1141   Pressure Injury Stage Unstageable 07/02/25 1141   Wound Length (cm) 1 cm 07/02/25 1141   Wound Width (cm) 0.7 cm 07/02/25 1141   Wound Depth (cm) 0.1 cm 07/02/25 1141   Wound Surface Area (cm^2) 0.55 cm^2 07/02/25 1141   Wound Volume (cm^3) 0.037 cm^3 07/02/25 1141   Calculated Wound Volume (cm^3) 0.07 cm^3 07/02/25 1141   Drainage Amount None 07/02/25 1141   Babita-wound Assessment Intact;Hyperpigmented 07/02/25 1141   Treatments Cleansed 07/02/25 1141   Dressing Triad paste 07/02/25 1141   Dressing Changed New 07/02/25 1141   Dressing Status Intact 07/02/25 1141       Wound 07/02/25 Traumatic Arm Left;Lower;Lateral;Distal (Active)   Wound Image   07/02/25 1123   Wound Description Black;Eschar;Granulation tissue;Pink 07/02/25 1123   Non-staged Wound Description Full thickness 07/02/25 1123   Wound Length (cm) 1 cm 07/02/25 1123   Wound Width (cm) 1 cm 07/02/25 1123   Wound Depth (cm) 0.1 cm 07/02/25 1123   Wound Surface Area (cm^2) 0.79 cm^2 07/02/25 1123   Wound Volume (cm^3) 0.052 cm^3 07/02/25 1123   Calculated Wound Volume (cm^3) 0.1 cm^3 07/02/25 1123   Drainage Amount Moderate 07/02/25 1123   Drainage Description Sanguineous 07/02/25 1123   Babita-wound Assessment Intact 07/02/25 1123   Treatments Cleansed 07/02/25 1123   Dressing Adaptic;Calcium Alginate with Silver;ABD;Dry dressing 07/02/25 1123   Dressing Changed New 07/02/25 1123   Dressing Status Clean;Dry;Intact 07/02/25 1123       Wound 07/02/25 Traumatic Leg Distal;Left;Posterior (Active)   Wound Image   07/02/25 1142   Wound Description Intact;Epithelialization;Light purple 07/02/25 1142   Non-staged Wound Description Not applicable 07/02/25 1142   Wound Length (cm) 0 cm 07/02/25 1142   Wound Width (cm) 0 cm 07/02/25 1142   Wound Depth (cm) 0 cm 07/02/25 1142   Wound Surface Area (cm^2) 0 cm^2 07/02/25 1142   Wound Volume (cm^3) 0  cm^3 07/02/25 1142   Calculated Wound Volume (cm^3) 0 cm^3 07/02/25 1142   Drainage Amount None 07/02/25 1142   Babita-wound Assessment Intact 07/02/25 1142   Treatments Cleansed 07/02/25 1142   Dressing Protective barrier 07/02/25 1142   Dressing Changed New 07/02/25 1142   Dressing Status Intact 07/02/25 1142     Discussed assessment findings, and plan of care/recommendations with Madisyn SORIANO.    Wound care will follow along with patient weekly, please call or text with questions and concerns.    Recommendations written as orders.  Iraida Galvan MSN, RN, CWON

## 2025-07-03 LAB
ALBUMIN SERPL BCG-MCNC: 3.1 G/DL (ref 3.5–5)
ALP SERPL-CCNC: 100 U/L (ref 34–104)
ALT SERPL W P-5'-P-CCNC: 17 U/L (ref 7–52)
ANION GAP SERPL CALCULATED.3IONS-SCNC: 7 MMOL/L (ref 4–13)
AST SERPL W P-5'-P-CCNC: 29 U/L (ref 13–39)
BACTERIA BLD CULT: ABNORMAL
BACTERIA UR QL AUTO: NORMAL /HPF
BASOPHILS # BLD AUTO: 0.02 THOUSANDS/ÂΜL (ref 0–0.1)
BASOPHILS NFR BLD AUTO: 1 % (ref 0–1)
BILIRUB SERPL-MCNC: 0.78 MG/DL (ref 0.2–1)
BILIRUB UR QL STRIP: NEGATIVE
BUN SERPL-MCNC: 16 MG/DL (ref 5–25)
CALCIUM ALBUM COR SERPL-MCNC: 8.7 MG/DL (ref 8.3–10.1)
CALCIUM SERPL-MCNC: 8 MG/DL (ref 8.4–10.2)
CHLORIDE SERPL-SCNC: 98 MMOL/L (ref 96–108)
CLARITY UR: CLEAR
CO2 SERPL-SCNC: 28 MMOL/L (ref 21–32)
COLOR UR: YELLOW
CREAT SERPL-MCNC: 0.94 MG/DL (ref 0.6–1.3)
EOSINOPHIL # BLD AUTO: 0.11 THOUSAND/ÂΜL (ref 0–0.61)
EOSINOPHIL NFR BLD AUTO: 4 % (ref 0–6)
ERYTHROCYTE [DISTWIDTH] IN BLOOD BY AUTOMATED COUNT: 17.6 % (ref 11.6–15.1)
GFR SERPL CREATININE-BSD FRML MDRD: 85 ML/MIN/1.73SQ M
GLUCOSE SERPL-MCNC: 107 MG/DL (ref 65–140)
GLUCOSE UR STRIP-MCNC: NEGATIVE MG/DL
GRAM STN SPEC: ABNORMAL
HCT VFR BLD AUTO: 26.1 % (ref 36.5–49.3)
HGB BLD-MCNC: 8.2 G/DL (ref 12–17)
HGB UR QL STRIP.AUTO: NEGATIVE
IMM GRANULOCYTES # BLD AUTO: 0.01 THOUSAND/UL (ref 0–0.2)
IMM GRANULOCYTES NFR BLD AUTO: 0 % (ref 0–2)
KETONES UR STRIP-MCNC: NEGATIVE MG/DL
L PNEUMO1 AG UR QL IA.RAPID: NEGATIVE
LEUKOCYTE ESTERASE UR QL STRIP: NEGATIVE
LYMPHOCYTES # BLD AUTO: 0.98 THOUSANDS/ÂΜL (ref 0.6–4.47)
LYMPHOCYTES NFR BLD AUTO: 35 % (ref 14–44)
MAGNESIUM SERPL-MCNC: 1.5 MG/DL (ref 1.9–2.7)
MCH RBC QN AUTO: 30.4 PG (ref 26.8–34.3)
MCHC RBC AUTO-ENTMCNC: 31.4 G/DL (ref 31.4–37.4)
MCV RBC AUTO: 97 FL (ref 82–98)
MONOCYTES # BLD AUTO: 0.56 THOUSAND/ÂΜL (ref 0.17–1.22)
MONOCYTES NFR BLD AUTO: 20 % (ref 4–12)
NEUTROPHILS # BLD AUTO: 1.14 THOUSANDS/ÂΜL (ref 1.85–7.62)
NEUTS SEG NFR BLD AUTO: 40 % (ref 43–75)
NITRITE UR QL STRIP: NEGATIVE
NON-SQ EPI CELLS URNS QL MICRO: NORMAL /HPF
NRBC BLD AUTO-RTO: 0 /100 WBCS
PH UR STRIP.AUTO: 7 [PH]
PHOSPHATE SERPL-MCNC: 2.5 MG/DL (ref 2.3–4.1)
PLATELET # BLD AUTO: 30 THOUSANDS/UL (ref 149–390)
PMV BLD AUTO: 11.9 FL (ref 8.9–12.7)
POTASSIUM SERPL-SCNC: 3.7 MMOL/L (ref 3.5–5.3)
PROT SERPL-MCNC: 6 G/DL (ref 6.4–8.4)
PROT UR STRIP-MCNC: ABNORMAL MG/DL
QRS AXIS: 102 DEGREES
QRSD INTERVAL: 92 MS
QT INTERVAL: 306 MS
QTC INTERVAL: 447 MS
RBC # BLD AUTO: 2.7 MILLION/UL (ref 3.88–5.62)
RBC #/AREA URNS AUTO: NORMAL /HPF
S EPIDERMIDIS DNA BLD POS QL NAA+NON-PRB: DETECTED
S PNEUM AG UR QL: NEGATIVE
SODIUM SERPL-SCNC: 133 MMOL/L (ref 135–147)
SP GR UR STRIP.AUTO: 1.02 (ref 1–1.03)
T WAVE AXIS: -52 DEGREES
UROBILINOGEN UR STRIP-ACNC: <2 MG/DL
VENTRICULAR RATE: 128 BPM
WBC # BLD AUTO: 2.82 THOUSAND/UL (ref 4.31–10.16)
WBC #/AREA URNS AUTO: NORMAL /HPF

## 2025-07-03 PROCEDURE — 81001 URINALYSIS AUTO W/SCOPE: CPT | Performed by: STUDENT IN AN ORGANIZED HEALTH CARE EDUCATION/TRAINING PROGRAM

## 2025-07-03 PROCEDURE — 80053 COMPREHEN METABOLIC PANEL: CPT | Performed by: INTERNAL MEDICINE

## 2025-07-03 PROCEDURE — 84100 ASSAY OF PHOSPHORUS: CPT | Performed by: INTERNAL MEDICINE

## 2025-07-03 PROCEDURE — 87081 CULTURE SCREEN ONLY: CPT | Performed by: INTERNAL MEDICINE

## 2025-07-03 PROCEDURE — 83735 ASSAY OF MAGNESIUM: CPT | Performed by: INTERNAL MEDICINE

## 2025-07-03 PROCEDURE — 87449 NOS EACH ORGANISM AG IA: CPT | Performed by: INTERNAL MEDICINE

## 2025-07-03 PROCEDURE — 85025 COMPLETE CBC W/AUTO DIFF WBC: CPT | Performed by: INTERNAL MEDICINE

## 2025-07-03 PROCEDURE — 99232 SBSQ HOSP IP/OBS MODERATE 35: CPT | Performed by: NURSE PRACTITIONER

## 2025-07-03 PROCEDURE — 97163 PT EVAL HIGH COMPLEX 45 MIN: CPT

## 2025-07-03 PROCEDURE — 93010 ELECTROCARDIOGRAM REPORT: CPT | Performed by: INTERNAL MEDICINE

## 2025-07-03 RX ORDER — LORAZEPAM 2 MG/ML
4 INJECTION INTRAMUSCULAR ONCE
Status: COMPLETED | OUTPATIENT
Start: 2025-07-03 | End: 2025-07-03

## 2025-07-03 RX ORDER — MAGNESIUM SULFATE HEPTAHYDRATE 40 MG/ML
2 INJECTION, SOLUTION INTRAVENOUS ONCE
Status: COMPLETED | OUTPATIENT
Start: 2025-07-03 | End: 2025-07-04

## 2025-07-03 RX ORDER — LORAZEPAM 1 MG/1
2 TABLET ORAL ONCE
Status: COMPLETED | OUTPATIENT
Start: 2025-07-03 | End: 2025-07-03

## 2025-07-03 RX ADMIN — APIXABAN 5 MG: 5 TABLET, FILM COATED ORAL at 09:53

## 2025-07-03 RX ADMIN — TOBRAMYCIN AND DEXAMETHASONE 1 DROP: 1; 3 SUSPENSION/ DROPS OPHTHALMIC at 23:27

## 2025-07-03 RX ADMIN — PANTOPRAZOLE SODIUM 40 MG: 40 TABLET, DELAYED RELEASE ORAL at 09:53

## 2025-07-03 RX ADMIN — Medication 125 MG: at 10:21

## 2025-07-03 RX ADMIN — METOPROLOL SUCCINATE 100 MG: 50 TABLET, EXTENDED RELEASE ORAL at 09:53

## 2025-07-03 RX ADMIN — FERROUS SULFATE TAB 325 MG (65 MG ELEMENTAL FE) 325 MG: 325 (65 FE) TAB at 09:53

## 2025-07-03 RX ADMIN — THIAMINE HCL TAB 100 MG 100 MG: 100 TAB at 09:53

## 2025-07-03 RX ADMIN — Medication 125 MG: at 21:38

## 2025-07-03 RX ADMIN — GABAPENTIN 300 MG: 300 CAPSULE ORAL at 16:24

## 2025-07-03 RX ADMIN — B-COMPLEX W/ C & FOLIC ACID TAB 1 TABLET: TAB at 09:53

## 2025-07-03 RX ADMIN — TOBRAMYCIN AND DEXAMETHASONE 1 DROP: 1; 3 SUSPENSION/ DROPS OPHTHALMIC at 13:34

## 2025-07-03 RX ADMIN — FLUTICASONE FUROATE AND VILANTEROL TRIFENATATE 1 PUFF: 200; 25 POWDER RESPIRATORY (INHALATION) at 09:57

## 2025-07-03 RX ADMIN — CEFTRIAXONE 1000 MG: 1 INJECTION, SOLUTION INTRAVENOUS at 18:35

## 2025-07-03 RX ADMIN — ATORVASTATIN CALCIUM 40 MG: 40 TABLET, FILM COATED ORAL at 16:24

## 2025-07-03 RX ADMIN — MAGNESIUM SULFATE HEPTAHYDRATE 2 G: 40 INJECTION, SOLUTION INTRAVENOUS at 09:54

## 2025-07-03 RX ADMIN — TAMSULOSIN HYDROCHLORIDE 0.4 MG: 0.4 CAPSULE ORAL at 16:24

## 2025-07-03 RX ADMIN — ASPIRIN 81 MG: 81 TABLET, CHEWABLE ORAL at 09:54

## 2025-07-03 RX ADMIN — TOBRAMYCIN AND DEXAMETHASONE 1 DROP: 1; 3 SUSPENSION/ DROPS OPHTHALMIC at 05:44

## 2025-07-03 RX ADMIN — GABAPENTIN 300 MG: 300 CAPSULE ORAL at 21:36

## 2025-07-03 RX ADMIN — LORAZEPAM 2 MG: 1 TABLET ORAL at 23:27

## 2025-07-03 RX ADMIN — DILTIAZEM HYDROCHLORIDE 120 MG: 60 CAPSULE, EXTENDED RELEASE ORAL at 09:58

## 2025-07-03 RX ADMIN — LORAZEPAM 2 MG: 1 TABLET ORAL at 13:49

## 2025-07-03 RX ADMIN — LORAZEPAM 4 MG: 2 INJECTION INTRAMUSCULAR; INTRAVENOUS at 01:25

## 2025-07-03 RX ADMIN — Medication 250 MG: at 09:54

## 2025-07-03 RX ADMIN — FOLIC ACID 1 MG: 1 TABLET ORAL at 09:53

## 2025-07-03 RX ADMIN — GABAPENTIN 300 MG: 300 CAPSULE ORAL at 09:53

## 2025-07-03 RX ADMIN — RANOLAZINE 500 MG: 500 TABLET, FILM COATED, EXTENDED RELEASE ORAL at 09:54

## 2025-07-03 NOTE — OCCUPATIONAL THERAPY NOTE
Occupational Therapy Cancellation Note       07/03/25 1118   Note Type   Note Type Cancelled Session   Cancel Reasons Refusal  (Second attempt to see pt for OT session. Pt now awake and alert but agitated, refusing participation in therapy as he wants his medication (did not specify what when asked) and adamantly perseverating on wanting to leave today, CHRISTIE Zamudio aware. Will follow.)     Cassie Plunkett OTR/L   [place] License # [id number]  [place] License # [id number]

## 2025-07-03 NOTE — ASSESSMENT & PLAN NOTE
A fib with RVR on presentation, not compliant with home medications  Resume home Cardizem 120 mg BID and Toprol  mg daily  Continue Eliquis  rate currently 80-90s  Monitor on telemetry

## 2025-07-03 NOTE — PLAN OF CARE
Problem: Potential for Falls  Goal: Patient will remain free of falls  Description: INTERVENTIONS:  - Educate patient/family on patient safety including physical limitations  - Instruct patient to call for assistance with activity   - Consider consulting OT/PT to assist with strengthening/mobility based on AM PAC & JH-HLM score  - Consult OT/PT to assist with strengthening/mobility   - Keep Call bell within reach  - Keep bed low and locked with side rails adjusted as appropriate  - Keep care items and personal belongings within reach  - Initiate and maintain comfort rounds  - Make Fall Risk Sign visible to staff  - Offer Toileting every 2 Hours, in advance of need  - Initiate/Maintain bed alarm  - Obtain necessary fall risk management equipment: yellow socks   - Apply yellow socks and bracelet for high fall risk patients  - Consider moving patient to room near nurses station  Outcome: Progressing     Problem: RESPIRATORY - ADULT  Goal: Achieves optimal ventilation and oxygenation  Description: INTERVENTIONS:  - Assess for changes in respiratory status  - Assess for changes in mentation and behavior  - Position to facilitate oxygenation and minimize respiratory effort  - Oxygen administered by appropriate delivery if ordered  - Initiate smoking cessation education as indicated  - Encourage broncho-pulmonary hygiene including cough, deep breathe, Incentive Spirometry  - Assess the need for suctioning and aspirate as needed  - Assess and instruct to report SOB or any respiratory difficulty  - Respiratory Therapy support as indicated  Outcome: Progressing     Problem: Nutrition/Hydration-ADULT  Goal: Nutrient/Hydration intake appropriate for improving, restoring or maintaining nutritional needs  Description: Monitor and assess patient's nutrition/hydration status for malnutrition. Collaborate with interdisciplinary team and initiate plan and interventions as ordered.  Monitor patient's weight and dietary intake as  ordered or per policy. Utilize nutrition screening tool and intervene as necessary. Determine patient's food preferences and provide high-protein, high-caloric foods as appropriate.     INTERVENTIONS:  - Monitor oral intake, urinary output, labs, and treatment plans  - Assess nutrition and hydration status and recommend course of action  - Evaluate amount of meals eaten  - Assist patient with eating if necessary   - Allow adequate time for meals  - Recommend/ encourage appropriate diets, oral nutritional supplements, and vitamin/mineral supplements  - Order, calculate, and assess calorie counts as needed  - Recommend, monitor, and adjust tube feedings and TPN/PPN based on assessed needs  - Assess need for intravenous fluids  - Provide specific nutrition/hydration education as appropriate  - Include patient/family/caregiver in decisions related to nutrition  Outcome: Progressing     Problem: Prexisting or High Potential for Compromised Skin Integrity  Goal: Skin integrity is maintained or improved  Description: INTERVENTIONS:  - Identify patients at risk for skin breakdown  - Assess and monitor skin integrity including under and around medical devices   - Assess and monitor nutrition and hydration status  - Monitor labs  - Assess for incontinence   - Turn and reposition patient  - Assist with mobility/ambulation  - Relieve pressure over eduardo prominences   - Avoid friction and shearing  - Provide appropriate hygiene as needed including keeping skin clean and dry  - Evaluate need for skin moisturizer/barrier cream  - Collaborate with interdisciplinary team  - Patient/family teaching  - Consider wound care consult

## 2025-07-03 NOTE — ASSESSMENT & PLAN NOTE
Evidenced by tachycardia, tachypnea, and leukopenia  Tachycardia in setting of atrial fibrillation/alcohol withdrawal and has chronic decreased WBC  Likely reactive due to possible aspiration, withdrawal  Procalcitonin negative  Blood cultures; 1/2 staph epidermidis; other clear  Continue rocephin for now, day 4

## 2025-07-03 NOTE — PLAN OF CARE
Problem: Potential for Falls  Goal: Patient will remain free of falls  Description: INTERVENTIONS:  - Educate patient/family on patient safety including physical limitations  - Instruct patient to call for assistance with activity   - Consider consulting OT/PT to assist with strengthening/mobility based on AM PAC & JH-HLM score  - Consult OT/PT to assist with strengthening/mobility   - Keep Call bell within reach  - Keep bed low and locked with side rails adjusted as appropriate  - Keep care items and personal belongings within reach  - Initiate and maintain comfort rounds  - Make Fall Risk Sign visible to staff  - Initiate/Maintain bed/chair alarm  - Apply yellow socks and bracelet for high fall risk patients  - Consider moving patient to room near nurses station  Outcome: Progressing     Problem: RESPIRATORY - ADULT  Goal: Achieves optimal ventilation and oxygenation  Description: INTERVENTIONS:  - Assess for changes in respiratory status  - Assess for changes in mentation and behavior  - Position to facilitate oxygenation and minimize respiratory effort  - Oxygen administered by appropriate delivery if ordered  - Initiate smoking cessation education as indicated  - Encourage broncho-pulmonary hygiene including cough, deep breathe, Incentive Spirometry  - Assess the need for suctioning and aspirate as needed  - Assess and instruct to report SOB or any respiratory difficulty  - Respiratory Therapy support as indicated  Outcome: Progressing     Problem: Nutrition/Hydration-ADULT  Goal: Nutrient/Hydration intake appropriate for improving, restoring or maintaining nutritional needs  Description: Monitor and assess patient's nutrition/hydration status for malnutrition. Collaborate with interdisciplinary team and initiate plan and interventions as ordered.  Monitor patient's weight and dietary intake as ordered or per policy. Utilize nutrition screening tool and intervene as necessary. Determine patient's food  preferences and provide high-protein, high-caloric foods as appropriate.     INTERVENTIONS:  - Monitor oral intake, urinary output, labs, and treatment plans  - Assess nutrition and hydration status and recommend course of action  - Evaluate amount of meals eaten  - Assist patient with eating if necessary   - Allow adequate time for meals  - Recommend/ encourage appropriate diets, oral nutritional supplements, and vitamin/mineral supplements  - Order, calculate, and assess calorie counts as needed  - Recommend, monitor, and adjust tube feedings and TPN/PPN based on assessed needs  - Assess need for intravenous fluids  - Provide specific nutrition/hydration education as appropriate  - Include patient/family/caregiver in decisions related to nutrition  Outcome: Progressing     Problem: Prexisting or High Potential for Compromised Skin Integrity  Goal: Skin integrity is maintained or improved  Description: INTERVENTIONS:  - Identify patients at risk for skin breakdown  - Assess and monitor skin integrity including under and around medical devices   - Assess and monitor nutrition and hydration status  - Monitor labs  - Assess for incontinence   - Turn and reposition patient  - Assist with mobility/ambulation  - Relieve pressure over eduardo prominences   - Avoid friction and shearing  - Provide appropriate hygiene as needed including keeping skin clean and dry  - Evaluate need for skin moisturizer/barrier cream  - Collaborate with interdisciplinary team  - Patient/family teaching  - Consider wound care consult    Assess:  - Review Herrera scale daily  - Clean and moisturize skin every daily  - Inspect skin when repositioning, toileting, and assisting with ADLS  - Assess extremities for adequate circulation and sensation     Bed Management:  - Have minimal linens on bed & keep smooth, unwrinkled  - Change linens as needed when moist or perspiring  - Assess for incontinence every daily    Activity:  - Use appropriate  equipment to lift or move patient in bed  - Instruct/ Assist with weight shifting every 2 when out of bed in chair  - Consider limitation of chair time 2 hour intervals    Skin Care:  - Avoid use of baby powder, tape, friction and shearing, hot water or constrictive clothing  - Relieve pressure over bony prominences using chair cushion  - Do not massage red bony areas    Outcome: Progressing

## 2025-07-03 NOTE — OCCUPATIONAL THERAPY NOTE
Occupational Therapy Cancellation Note       07/03/25 0925   Note Type   Note Type Cancelled Session   Cancel Reasons Other  (Attempted to see pt for OT treatment session. Pt currently soundly sleeping after receiving ativan due to withdrawal symptoms. Will re-attempt later as time/schedule permits.)     Cassie Plunkett OTR/L   [place] License # [id number]  [place] License # [id number]

## 2025-07-03 NOTE — PROGRESS NOTES
Progress Note - Hospitalist   Name: Gregg Partida 65 y.o. male I MRN: [id number]  Unit/Bed#: 2 [address] Date of Admission: 6/30/2025   Date of Service: 7/3/2025 I Hospital Day: 3    Assessment & Plan  Acute respiratory failure with hypoxia (HCC)  Patient with history of alcoholism and frequent ED visits presented to ED due to weakness, noted to be hypoxic in ED requiring 4 L nc.  CXR: Mild central pulmonary vascular congestion. Left pleural fluid. Left seventh rib fracture. No visible pneumothorax.  Concern for recurrent aspiration given chronic alcoholism  Procalcitonin negative x 2, afebrile, mild leukopenia  Started on ceftriaxone current day #4, continue for now; BC 1/2  staphylococcus epidermidis, suspect contaminant   Follow up urine antigens and sputum culture if able  Speech consulted, regular food texture and thin liquids recommended   Xopenex nebs PRN  Currently stable on room air  Atrial fibrillation (HCC)  A fib with RVR on presentation, not compliant with home medications  Resume home Cardizem 120 mg BID and Toprol  mg daily  Continue Eliquis  rate currently 80-90s  Monitor on telemetry  SIRS (systemic inflammatory response syndrome) (HCC)  Evidenced by tachycardia, tachypnea, and leukopenia  Tachycardia in setting of atrial fibrillation/alcohol withdrawal and has chronic decreased WBC  Likely reactive due to possible aspiration, withdrawal  Procalcitonin negative  Blood cultures; 1/2 staph epidermidis; other clear  Continue rocephin for now, day 4   Pancytopenia (HCC)  Likely sequela of chronic alcoholism  trend CBC  Alcohol intoxication (HCC)  Frequent ED visits/admissions due to alcohol intoxication and withdrawal  Continue CIWA protocol  Thiamine, folic acid, multivitamin  Replete electrolytes as needed  CIWA overnight 15, agitated; required IV ativan and one to one observation   Monitor   History of Clostridium difficile infection  Continue prophylactic oral vancomycin while on abx and  72 hours beyond  Elevated alkaline phosphatase  In the setting of chronic alcoholism  Limit/avoid hepatotoxins as possible  CAD (coronary artery disease)  Status post prior CABG  Continue ASA/statin/beta blocker  COPD (chronic obstructive pulmonary disease) (HCC)  Continue IC/LABA maintenance regimen - PRN Xopenex nebulization  Essential hypertension  Continue home Toprol XL and Cardizem  BP currently stable    VTE Pharmacologic Prophylaxis: VTE Score: 4 Moderate Risk (Score 3-4) - Pharmacological DVT Prophylaxis Ordered: apixaban (Eliquis).    Mobility:   Basic Mobility Inpatient Raw Score: 15  JH-HLM Goal: 4: Move to chair/commode  JH-HLM Achieved: 7: Walk 25 feet or more  JH-HLM Goal achieved. Continue to encourage appropriate mobility.    Patient Centered Rounds: I performed bedside rounds with nursing staff today.   Discussions with Specialists or Other Care Team Provider: multidisciplinary team     Education and Discussions with Family / Patient: Attempted to update  (son) via phone. Left voicemail.     Current Length of Stay: 3 day(s)  Current Patient Status: Inpatient   Certification Statement: The patient will continue to require additional inpatient hospital stay due to CIWA monitoring, repeat labs, one to one observation, IV abx  Discharge Plan: Anticipate discharge in 24-48 hrs to discharge location to be determined pending rehab evaluations.    Code Status: Level 1 - Full Code    Subjective   Patient seen sleeping, had received IV Ativan.  Does open eyes but then drifts back to sleep.  Appears calm currently.  One-to-one observer sitting in room as patient was impulsive overnight.  Reports that patient has been sleeping most of the morning    Objective :  Temp:  [98 °F (36.7 °C)-100.3 °F (37.9 °C)] 98 °F (36.7 °C)  HR:  [] 82  BP: ()/(56-86) 129/86  Resp:  [17-20] 17  SpO2:  [92 %-98 %] 97 %  O2 Device: None (Room air)    Body mass index is 22.3 kg/m².     Input and Output  Summary (last 24 hours):   No intake or output data in the 24 hours ending 07/03/25 0938    Physical Exam  Vitals and nursing note reviewed.   Constitutional:       General: He is not in acute distress.     Comments: Chronically ill appearing    HENT:      Head: Normocephalic.      Nose: Nose normal.      Mouth/Throat:      Mouth: Mucous membranes are dry.     Eyes:      General:         Right eye: Discharge present.         Left eye: Discharge present.     Extraocular Movements: Extraocular movements intact.       Cardiovascular:      Rate and Rhythm: Normal rate. Rhythm irregular.      Heart sounds: Murmur heard.   Pulmonary:      Effort: Pulmonary effort is normal.      Breath sounds: Normal breath sounds.   Abdominal:      General: There is no distension.      Palpations: Abdomen is soft.      Tenderness: There is no abdominal tenderness.   Genitourinary:     Comments: Voiding spontaneously     Musculoskeletal:      Cervical back: Normal range of motion.      Comments: Ambulates with walker      Skin:     General: Skin is warm and dry.      Capillary Refill: Capillary refill takes less than 2 seconds.      Comments: Multiple scabbed areas all over body, arms, torso, legs     Neurological:      General: No focal deficit present.     Psychiatric:      Comments: Flat, anxious            Lines/Drains:        Telemetry:  Telemetry Orders (From admission, onward)               24 Hour Telemetry Monitoring  Continuous x 24 Hours (Telem)        Expiring   Question:  Reason for 24 Hour Telemetry  Answer:  Arrhythmias requiring acute medical intervention / PPM or ICD malfunction                     Telemetry Reviewed: Atrial fibrillation. HR averaging 80s  Indication for Continued Telemetry Use: Arrthymias requiring medical therapy               Lab Results: I have reviewed the following results:   Results from last 7 days   Lab Units 07/03/25  0543   WBC Thousand/uL 2.82*   HEMOGLOBIN g/dL 8.2*   HEMATOCRIT % 26.1*    PLATELETS Thousands/uL 30*   SEGS PCT % 40*   LYMPHO PCT % 35   MONO PCT % 20*   EOS PCT % 4     Results from last 7 days   Lab Units 07/03/25  0543   SODIUM mmol/L 133*   POTASSIUM mmol/L 3.7   CHLORIDE mmol/L 98   CO2 mmol/L 28   BUN mg/dL 16   CREATININE mg/dL 0.94   ANION GAP mmol/L 7   CALCIUM mg/dL 8.0*   ALBUMIN g/dL 3.1*   TOTAL BILIRUBIN mg/dL 0.78   ALK PHOS U/L 100   ALT U/L 17   AST U/L 29   GLUCOSE RANDOM mg/dL 107     Results from last 7 days   Lab Units 06/30/25  1725   INR  1.05             Results from last 7 days   Lab Units 07/01/25  0443 06/30/25  1725   LACTIC ACID mmol/L  --  1.3   PROCALCITONIN ng/ml 0.07 0.09       Recent Cultures (last 7 days):   Results from last 7 days   Lab Units 06/30/25  1725   BLOOD CULTURE  Staphylococcus epidermidis*  No Growth at 48 hrs.   GRAM STAIN RESULT  Gram positive cocci in clusters*       Imaging Results Review: No pertinent imaging studies reviewed.  Other Study Results Review: No additional pertinent studies reviewed.    Last 24 Hours Medication List:     Current Facility-Administered Medications:     acetaminophen (TYLENOL) tablet 650 mg, Q6H PRN    apixaban (ELIQUIS) tablet 5 mg, BID    aspirin chewable tablet 81 mg, Daily    atorvastatin (LIPITOR) tablet 40 mg, Daily With Dinner    cefTRIAXone (ROCEPHIN) IVPB (premix in dextrose) 1,000 mg 50 mL, Once    cefTRIAXone (ROCEPHIN) IVPB (premix in dextrose) 1,000 mg 50 mL, Q24H, Last Rate: 1,000 mg (07/02/25 1828)    dextromethorphan-guaiFENesin (ROBITUSSIN DM) oral syrup 10 mL, Q4H PRN    diltiazem (CARDIZEM SR) 12 hr capsule 120 mg, BID    ferrous sulfate tablet 325 mg, Daily With Breakfast    fluticasone-vilanterol 200-25 mcg/actuation 1 puff, Daily    folic acid (FOLVITE) tablet 1 mg, Daily    gabapentin (NEURONTIN) capsule 300 mg, TID    hydrALAZINE (APRESOLINE) injection 5 mg, Q6H PRN    levalbuterol (XOPENEX) inhalation solution 1.25 mg, Q8H PRN **AND** [DISCONTINUED] sodium chloride 0.9 %  inhalation solution 3 mL, Q6H PRN    magnesium sulfate 2 g/50 mL IVPB (premix) 2 g, Once    metoprolol succinate (TOPROL-XL) 24 hr tablet 100 mg, Daily    multivitamin stress formula tablet 1 tablet, Daily    pantoprazole (PROTONIX) EC tablet 40 mg, Daily    ranolazine (RANEXA) 12 hr tablet 500 mg, BID    saccharomyces boulardii (FLORASTOR) capsule 250 mg, BID    tamsulosin (FLOMAX) capsule 0.4 mg, Daily With Dinner    thiamine tablet 100 mg, Daily    tobramycin-dexamethasone (TOBRADEX) 0.3-0.1 % ophthalmic suspension 1 drop, Q6H SEDA    trimethobenzamide (TIGAN) IM injection 200 mg, Q6H PRN    vancomycin (VANCOCIN) oral solution 125 mg, Q12H SEDA    Administrative Statements   Today, Patient Was Seen By: CHELSEA Hyde  I have spent a total time of greater than 45 minutes in caring for this patient on the day of the visit/encounter including Diagnostic results, Risks and benefits of tx options, Instructions for management, Patient and family education, Importance of tx compliance, Risk factor reductions, Impressions, Counseling / Coordination of care, Documenting in the medical record, Reviewing/placing orders in the medical record (including tests, medications, and/or procedures), and Communicating with other healthcare professionals .    **Please Note: This note may have been constructed using a voice recognition system.**

## 2025-07-03 NOTE — ASSESSMENT & PLAN NOTE
Frequent ED visits/admissions due to alcohol intoxication and withdrawal  Continue CIWA protocol  Thiamine, folic acid, multivitamin  Replete electrolytes as needed  CIWA overnight 15, agitated; required IV ativan and one to one observation   Monitor

## 2025-07-03 NOTE — ASSESSMENT & PLAN NOTE
Patient with history of alcoholism and frequent ED visits presented to ED due to weakness, noted to be hypoxic in ED requiring 4 L nc.  CXR: Mild central pulmonary vascular congestion. Left pleural fluid. Left seventh rib fracture. No visible pneumothorax.  Concern for recurrent aspiration given chronic alcoholism  Procalcitonin negative x 2, afebrile, mild leukopenia  Started on ceftriaxone current day #4, continue for now; BC 1/2  staphylococcus epidermidis, suspect contaminant   Follow up urine antigens and sputum culture if able  Speech consulted, regular food texture and thin liquids recommended   Xopenex nebs PRN  Currently stable on room air

## 2025-07-03 NOTE — PHYSICAL THERAPY NOTE
PT EVALUATION     07/03/25 1410   PT Last Visit   PT Visit Date 07/03/25   Note Type   Note type Evaluation   Pain Assessment   Pain Assessment Tool Medeiros-Baker FACES   Medeiros-Baker FACES Pain Rating 0   Restrictions/Precautions   Other Precautions Chair Alarm;Bed Alarm;Cognitive;Fall Risk;Impulsive  (CIWA protocol)   Home Living   Type of Home Apartment   Home Layout One level;Elevator   Bathroom Equipment Grab bars around toilet;Grab bars in shower   Home Equipment Walker;Cane  (Rollator)   Additional Comments Patient utilizing a walker prior to admission at all times   Prior Function   Level of [place] Independent with ADLs;Independent with functional mobility;Needs assistance with IADLS   Lives With Alone   Receives Help From Neighbor  (Patient states having neighbor who assists as well as his son is nearby)   IADLs Independent with meal prep;Independent with medication management;Family/Friend/Other provides transportation   Falls in the last 6 months >10   Comments Patient with numerous admissions with extended history of EtOH and fall history   General   Additional Pertinent History Chart reviewed, patient admitted with acute respiratory failure with hypoxia now on CIWA protocol with agitation at times receiving Ativan.  Patient requiring increased assist at this time with walker as he is impulsive and unpredictable   Family/Caregiver Present No   Cognition   Overall Cognitive Status Impaired   Arousal/Participation Cooperative   Attention Difficulty attending to directions   Orientation Level Oriented to person;Oriented to place   Following Commands Follows one step commands with increased time or repetition   Subjective   Subjective Patient states feeling as if he needs to urinate but unable to do so at this time(nurse present and aware)   RLE Assessment   RLE Assessment   (Range of motion within functional limits, strength 3+/5 except right dorsi flexors 2 -/5 (weakness in right dorsi flexors noted  in past admissions))   LLE Assessment   LLE Assessment   (Range of motion within functional limits, strength 3+/5)   Coordination   Movements are Fluid and Coordinated 0   Coordination and Movement Description Decreased coordination with all transfers, bed mobility and gait activity at this time.  Patient on CIWA protocol unsteady and impulsive   Bed Mobility   Supine to Sit 5  Supervision   Additional items HOB elevated;Bedrails   Sit to Supine 5  Supervision   Additional items HOB elevated;Bedrails;Verbal cues   Transfers   Sit to Stand 3  Moderate assistance   Additional items Assist x 1;Verbal cues   Stand to Sit 4  Minimal assistance   Additional items Assist x 1;Verbal cues;Impulsive   Ambulation/Elevation   Gait Assistance 3  Moderate assist   Additional items Assist x 1;Verbal cues;Tactile cues   Assistive Device Rolling walker   Distance 15 feet x 2 with standing attempts to urinate at toilet.  Ataxic type gait patterning at this time unsteady with loss of balance laterally at times and right foot drag with dorsi flexor weakness(steppage type gait)   Balance   Static Sitting Fair   Dynamic Sitting Fair -   Static Standing Poor +   Dynamic Standing Poor   Ambulatory Poor   Activity Tolerance   Activity Tolerance Patient limited by fatigue;Treatment limited secondary to medical complications (Comment)  (CIWA protocol)   Nurse Made Aware Yes   Assessment   Prognosis Good   Problem List Decreased strength;Decreased range of motion;Decreased endurance;Impaired balance;Decreased mobility;Decreased coordination;Decreased cognition;Impaired judgement;Decreased safety awareness   Assessment Patient seen for Physical Therapy evaluation. Patient admitted with Acute respiratory failure with hypoxia (HCC).  Comorbidities affecting patient's physical performance include: .  Personal factors affecting patient at time of initial evaluation include: ambulating with assistive device, inability to ambulate household  distances, inability to navigate community distances, inability to navigate level surfaces without external assistance, inability to perform dynamic tasks in community, decreased cognition, limited home support, positive fall history, impulsivity, compliance, inability to perform physical activity, inability to perform ADLS, and inability to perform IADLS . Prior to admission, patient was independent with functional mobility with walker, independent with ADLS, requiring assist for IADLS, ambulating household distance, and ambulating community distances.  Please find objective findings from Physical Therapy assessment regarding body systems outlined above with impairments and limitations including weakness, decreased ROM, impaired balance, decreased endurance, impaired coordination, gait deviations, decreased activity tolerance, decreased functional mobility tolerance, decreased safety awareness, impaired judgement, fall risk, and decreased cognition.  The Barthel Index was used as a functional outcome tool presenting with a score of Barthel Index Score: 45 today indicating marked limitations of functional mobility and ADLS.  Patient's clinical presentation is currently unstable/unpredictable as seen in patient's presentation of vital sign response, changing level of pain, varying levels of cognitive performance, increased fall risk, new onset of impairment of functional mobility, decreased endurance, and new onset of weakness. Pt would benefit from continued Physical Therapy treatment to address deficits as defined above and maximize level of functional mobility. As demonstrated by objective findings, the assigned level of complexity for this evaluation is high.The patient's -[facility] Basic Mobility Inpatient Short Form Raw Score is 15. A Raw score of less than or equal to 16 suggests the patient may benefit from discharge to post-acute rehabilitation services although with improved medical status/CIWA protocol  completion patient will be able to return to prior setting with assist. Please also refer to the recommendation of the Physical Therapist for safe discharge planning.   Goals   Patient Goals None stated at this time patient having received Ativan earlier   STG Expiration Date 07/10/25   Short Term Goal #1 Transfers and gait with roller walker with supervision   Short Term Goal #2 Gait endurance 220 feet   LTG Expiration Date 07/17/25   Long Term Goal #1 Strength bilateral lower extremities 4 -/5   Long Term Goal #2 Independent transfers and gait with roller walker for functional community distances as prior to admission   Plan   Treatment/Interventions Functional transfer training;LE strengthening/ROM;Therapeutic exercise;Endurance training;Patient/family training;Equipment eval/education;Bed mobility;Gait training;Compensatory technique education   PT Frequency 3-5x/wk   Discharge Recommendation   Rehab Resource Intensity Level, PT III (Minimum Resource Intensity)   AM-PAC Basic Mobility Inpatient   Turning in Flat Bed Without Bedrails 4   Lying on Back to Sitting on Edge of Flat Bed Without Bedrails 4   Moving Bed to Chair 2   Standing Up From Chair Using Arms 2   Walk in Room 2   Climb 3-5 Stairs With Railing 1   Basic Mobility Inpatient Raw Score 15   Basic Mobility Standardized Score 36.97   [facility] Highest Level Of Mobility   -HLM Goal 4: Move to chair/commode   -HLM Achieved 7: Walk 25 feet or more   Barthel Index   Feeding 10   Bathing 0   Grooming Score 0   Dressing Score 5   Bladder Score 10   Bowels Score 10   Toilet Use Score 5   Transfers (Bed/Chair) Score 5   Mobility (Level Surface) Score 0   Stairs Score 0   Barthel Index Score 45   Licensure   [place] License Number  Heather Dacia, PT 4 [id number] QA [id number]

## 2025-07-04 VITALS
HEART RATE: 94 BPM | SYSTOLIC BLOOD PRESSURE: 150 MMHG | HEIGHT: 74 IN | BODY MASS INDEX: 22.3 KG/M2 | OXYGEN SATURATION: 95 % | DIASTOLIC BLOOD PRESSURE: 92 MMHG | RESPIRATION RATE: 19 BRPM | WEIGHT: 173.72 LBS | TEMPERATURE: 98.9 F

## 2025-07-04 PROBLEM — R53.1 GENERALIZED WEAKNESS: Status: ACTIVE | Noted: 2025-07-04

## 2025-07-04 LAB
ALBUMIN SERPL BCG-MCNC: 3.1 G/DL (ref 3.5–5)
ALP SERPL-CCNC: 115 U/L (ref 34–104)
ALT SERPL W P-5'-P-CCNC: 15 U/L (ref 7–52)
ANION GAP SERPL CALCULATED.3IONS-SCNC: 6 MMOL/L (ref 4–13)
AST SERPL W P-5'-P-CCNC: 25 U/L (ref 13–39)
BILIRUB SERPL-MCNC: 0.57 MG/DL (ref 0.2–1)
BUN SERPL-MCNC: 14 MG/DL (ref 5–25)
CALCIUM ALBUM COR SERPL-MCNC: 9 MG/DL (ref 8.3–10.1)
CALCIUM SERPL-MCNC: 8.3 MG/DL (ref 8.4–10.2)
CHLORIDE SERPL-SCNC: 98 MMOL/L (ref 96–108)
CO2 SERPL-SCNC: 28 MMOL/L (ref 21–32)
CREAT SERPL-MCNC: 0.93 MG/DL (ref 0.6–1.3)
ERYTHROCYTE [DISTWIDTH] IN BLOOD BY AUTOMATED COUNT: 17.8 % (ref 11.6–15.1)
GFR SERPL CREATININE-BSD FRML MDRD: 87 ML/MIN/1.73SQ M
GLUCOSE SERPL-MCNC: 120 MG/DL (ref 65–140)
HCT VFR BLD AUTO: 29.1 % (ref 36.5–49.3)
HGB BLD-MCNC: 9.2 G/DL (ref 12–17)
MCH RBC QN AUTO: 30.5 PG (ref 26.8–34.3)
MCHC RBC AUTO-ENTMCNC: 31.6 G/DL (ref 31.4–37.4)
MCV RBC AUTO: 96 FL (ref 82–98)
PLATELET # BLD AUTO: 45 THOUSANDS/UL (ref 149–390)
PMV BLD AUTO: 12.3 FL (ref 8.9–12.7)
POTASSIUM SERPL-SCNC: 3.9 MMOL/L (ref 3.5–5.3)
PROT SERPL-MCNC: 6.4 G/DL (ref 6.4–8.4)
RBC # BLD AUTO: 3.02 MILLION/UL (ref 3.88–5.62)
SODIUM SERPL-SCNC: 132 MMOL/L (ref 135–147)
WBC # BLD AUTO: 4.19 THOUSAND/UL (ref 4.31–10.16)

## 2025-07-04 PROCEDURE — 85027 COMPLETE CBC AUTOMATED: CPT | Performed by: NURSE PRACTITIONER

## 2025-07-04 PROCEDURE — 99239 HOSP IP/OBS DSCHRG MGMT >30: CPT

## 2025-07-04 PROCEDURE — 80053 COMPREHEN METABOLIC PANEL: CPT | Performed by: NURSE PRACTITIONER

## 2025-07-04 RX ORDER — VANCOMYCIN HYDROCHLORIDE 125 MG/1
125 CAPSULE ORAL EVERY 12 HOURS
Qty: 6 CAPSULE | Refills: 0 | Status: SHIPPED | OUTPATIENT
Start: 2025-07-04 | End: 2025-07-07

## 2025-07-04 RX ADMIN — METOPROLOL SUCCINATE 100 MG: 50 TABLET, EXTENDED RELEASE ORAL at 09:05

## 2025-07-04 RX ADMIN — THIAMINE HCL TAB 100 MG 100 MG: 100 TAB at 09:06

## 2025-07-04 RX ADMIN — PANTOPRAZOLE SODIUM 40 MG: 40 TABLET, DELAYED RELEASE ORAL at 09:06

## 2025-07-04 RX ADMIN — GABAPENTIN 300 MG: 300 CAPSULE ORAL at 09:04

## 2025-07-04 RX ADMIN — DILTIAZEM HYDROCHLORIDE 120 MG: 60 CAPSULE, EXTENDED RELEASE ORAL at 09:02

## 2025-07-04 RX ADMIN — FERROUS SULFATE TAB 325 MG (65 MG ELEMENTAL FE) 325 MG: 325 (65 FE) TAB at 09:03

## 2025-07-04 RX ADMIN — Medication 250 MG: at 09:06

## 2025-07-04 RX ADMIN — B-COMPLEX W/ C & FOLIC ACID TAB 1 TABLET: TAB at 09:04

## 2025-07-04 RX ADMIN — FOLIC ACID 1 MG: 1 TABLET ORAL at 09:04

## 2025-07-04 RX ADMIN — TOBRAMYCIN AND DEXAMETHASONE 1 DROP: 1; 3 SUSPENSION/ DROPS OPHTHALMIC at 06:54

## 2025-07-04 RX ADMIN — ASPIRIN 81 MG: 81 TABLET, CHEWABLE ORAL at 09:01

## 2025-07-04 RX ADMIN — APIXABAN 5 MG: 5 TABLET, FILM COATED ORAL at 09:01

## 2025-07-04 RX ADMIN — Medication 125 MG: at 09:07

## 2025-07-04 RX ADMIN — FLUTICASONE FUROATE AND VILANTEROL TRIFENATATE 1 PUFF: 200; 25 POWDER RESPIRATORY (INHALATION) at 09:03

## 2025-07-04 RX ADMIN — RANOLAZINE 500 MG: 500 TABLET, FILM COATED, EXTENDED RELEASE ORAL at 09:05

## 2025-07-04 NOTE — DISCHARGE INSTR - AVS FIRST PAGE
Please continue with complete cessation from alcohol.  Please follow-up with your primary care provider in 1 to 2 weeks for posthospitalization follow-up.  Please continue your medications as prescribed.

## 2025-07-04 NOTE — ASSESSMENT & PLAN NOTE
Evidenced by tachycardia, tachypnea, and leukopenia  Tachycardia in setting of atrial fibrillation/alcohol withdrawal and has chronic decreased WBC  Likely reactive due to possible aspiration, withdrawal  CXR without evidence of pneumonia  Procalcitonin negative  Blood cultures with contaminant  Completed 3 days ceftriaxone, monitor off further antibiotics

## 2025-07-04 NOTE — DISCHARGE SUMMARY
Discharge Summary - Hospitalist   Name: Gregg Partida 65 y.o. male I MRN: [id number]  Unit/Bed#: 2 [address] Date of Admission: 6/30/2025   Date of Service: 7/4/2025 I Hospital Day: 4     Assessment & Plan  Acute respiratory failure with hypoxia (HCC)  Patient with history of alcoholism and frequent ED visits presented to ED due to weakness, noted to be hypoxic in ED requiring 4 L nc.  CXR: Mild central pulmonary vascular congestion. Left pleural fluid. Left seventh rib fracture. No visible pneumothorax.  Concern for recurrent aspiration given chronic alcoholism  Procalcitonin negative x 2, afebrile, mild leukopenia  Initially started on ceftriaxone, discontinued  BC with contaminant  Urinary antigens negative  Speech consulted, regular food texture and thin liquids recommended   Received dose of IV lasix, continue home torsemide on discharge  Component of hypoxia likely due to volume overload given noncompliance with home diuretics and cardiac medications  Currently stable on room air, encouraged compliance with home regimen on discharge  Atrial fibrillation (HCC)  A fib with RVR on presentation, not compliant with home medications  Resume home Cardizem 120 mg BID and Toprol  mg daily  Continue Eliquis  Improved, rates stable  SIRS (systemic inflammatory response syndrome) (HCC)  Evidenced by tachycardia, tachypnea, and leukopenia  Tachycardia in setting of atrial fibrillation/alcohol withdrawal and has chronic decreased WBC  Likely reactive due to possible aspiration, withdrawal  CXR without evidence of pneumonia  Procalcitonin negative  Blood cultures with contaminant  Completed 3 days ceftriaxone, monitor off further antibiotics  Alcohol intoxication (HCC)  Frequent ED visits/admissions due to alcohol intoxication and withdrawal  Overnight 7/2 noted to be agitated causing high CIWA score and 1:1 observation; patient states this was because he wanted to leave and was not allowed  Patient calm and  cooperative today, low CIWA scores, symptoms improved  Thiamine, folic acid, multivitamin  Encouraged complete alcohol cessation  Pancytopenia (HCC)  Likely sequela of chronic alcoholism, now trending up  Follow up with PCP  History of Clostridium difficile infection  Continue prophylactic oral vancomycin while on abx  Elevated alkaline phosphatase  In the setting of chronic alcoholism  Limit/avoid hepatotoxins as possible  CAD (coronary artery disease)  Status post prior CABG  Continue ASA/statin/beta blocker  COPD (chronic obstructive pulmonary disease) (HCC)  Continue IC/LABA maintenance regimen  Essential hypertension  Continue home Toprol XL and Cardizem  BP currently stable  Generalized weakness  Initial reason for presentation  Likely multifactorial in setting of atrial fibrillation with RVR, chronic alcohol abuse, falls, electrolyte abnormalities  Seen by PT/OT, recommended level III  Declined rehab, requesting discharge home     Medical Problems       Resolved Problems  Date Reviewed: 7/4/2025   None       Discharging Physician / Practitioner: Zenaida Larson PA-C  PCP: Lilliana Bliss MD  Admission Date:   Admission Orders (From admission, onward)       Ordered        06/30/25 1653  INPATIENT ADMISSION  Once                          Discharge Date: 07/04/25    Test Results Pending at Discharge (will require follow up):  none    Medication Changes for Discharge & Rationale:   none  See after visit summary for reconciled discharge medications provided to patient and/or family.     Consultations During Hospital Stay:  none    Procedures Performed:   none    Significant Findings / Test Results:   CXR: Mild central pulmonary vascular congestion. Left pleural fluid. Left seventh rib fracture. No visible pneumothorax.  CT head: 1. No acute intracranial abnormality. 2. No acute cervical spine fracture or traumatic malalignment. 3. Extensive postsurgical changes in the cervical spine with a posterior paraspinal fluid  collection spanning the surgical levels. This is unchanged from prior exam.   CT cervical spine: 1. No acute intracranial abnormality. 2. No acute cervical spine fracture or traumatic malalignment. 3. Extensive postsurgical changes in the cervical spine with a posterior paraspinal fluid collection spanning the surgical levels. This is unchanged from prior exam.     Incidental Findings:   none    Hospital Course:   Gregg Partida is a 65 y.o. male patient who originally presented to the hospital on 6/30/2025 due to generalized weakness. He has frequent ED visits for alcohol intoxication and was seen earlier that day and ultimately discharged. He felt weak again and called 911. He was noted to be hypoxic on arrival but denied any specific symptoms other than weakness. He was initially started on ceftriaxone due to concerns for aspiration. CXR showed mild central pulmonary vascular congestion, left pleural fluid, left seventh rib fracture, no pneumothorax. He received a dose of IV lasix. Infectious workup was ultimately negative, ceftriaxone discontinued. He was noted to be in atrial fibrillation with RVR on presentation likely due to medication noncompliance and alcohol withdrawal. Improved with resumption of home cardizem and Toprol XL. He was on CIWA protocol, recent scores have been low prior to discharge. He has remained stable on room air. Encouraged complete alcohol cessation and compliance with home medications. He will be discharged home.    Please see above list of diagnoses and related plan for additional information.     Discharge Day Visit / Exam:   Subjective:  Patient upset, requesting discharge this morning. States he should have left yesterday. States he was agitated yesterday night because he wanted to leave and was not allowed. HR improved since last admission and stable on room air. Denies any current complaints, tolerating diet. Reports he has been ambulating without difficulty and would not go to  "a rehab facility if offered. States he has to get back to work. Denies dizziness, headache, chest pain, shortness of breath, cough, nausea, vomiting, abdominal pain. He denies any pain at location of rib fracture on XR.  Vitals: Blood Pressure: 150/92 (07/04/25 0708)  Pulse: 94 (07/04/25 0708)  Temperature: 98.9 °F (37.2 °C) (07/04/25 0708)  Temp Source: Oral (07/04/25 0708)  Respirations: 19 (07/04/25 0708)  Height: 6' 2\" (188 cm) (07/01/25 0027)  Weight - Scale: 78.8 kg (173 lb 11.6 oz) (07/01/25 0027)  SpO2: 95 % (07/04/25 0708)  Physical Exam  Vitals and nursing note reviewed.   Constitutional:       General: He is not in acute distress.     Appearance: He is well-developed. He is ill-appearing (chronically).     Cardiovascular:      Rate and Rhythm: Normal rate. Rhythm irregular.      Heart sounds: No murmur heard.     Comments: HR 80-90s  Pulmonary:      Effort: Pulmonary effort is normal. No respiratory distress.      Breath sounds: Normal breath sounds.   Abdominal:      Palpations: Abdomen is soft.      Tenderness: There is no abdominal tenderness.     Musculoskeletal:         General: No swelling.     Skin:     General: Skin is warm and dry.      Capillary Refill: Capillary refill takes less than 2 seconds.     Neurological:      Mental Status: He is alert. Mental status is at baseline.     Psychiatric:         Mood and Affect: Mood normal.        Discussion with Family: Patient declined call to .     Discharge instructions/Information to patient and family:   See after visit summary for information provided to patient and family.      Provisions for Follow-Up Care:  See after visit summary for information related to follow-up care and any pertinent home health orders.      Mobility at time of Discharge:   Basic Mobility Inpatient Raw Score: 15  JH-HLM Goal: 4: Move to chair/commode  JH-HLM Achieved: 4: Move to chair/commode  HLM Goal achieved. Continue to encourage appropriate mobility.   "   Disposition:   Home    Planned Readmission: none    Administrative Statements   Discharge Statement:  I have spent a total time of 45 minutes in caring for this patient on the day of the visit/encounter. >30 minutes of time was spent on: Diagnostic results, Risks and benefits of tx options, Instructions for management, Patient and family education, Importance of tx compliance, Risk factor reductions, Impressions, Counseling / Coordination of care, Documenting in the medical record, Reviewing / ordering tests, medicine, procedures  , and Communicating with other healthcare professionals .    **Please Note: This note may have been constructed using a voice recognition system**

## 2025-07-04 NOTE — ASSESSMENT & PLAN NOTE
Initial reason for presentation  Likely multifactorial in setting of atrial fibrillation with RVR, chronic alcohol abuse, falls, electrolyte abnormalities  Seen by PT/OT, recommended level III  Declined rehab, requesting discharge home

## 2025-07-04 NOTE — ASSESSMENT & PLAN NOTE
A fib with RVR on presentation, not compliant with home medications  Resume home Cardizem 120 mg BID and Toprol  mg daily  Continue Eliquis  Improved, rates stable

## 2025-07-04 NOTE — ASSESSMENT & PLAN NOTE
Patient with history of alcoholism and frequent ED visits presented to ED due to weakness, noted to be hypoxic in ED requiring 4 L nc.  CXR: Mild central pulmonary vascular congestion. Left pleural fluid. Left seventh rib fracture. No visible pneumothorax.  Concern for recurrent aspiration given chronic alcoholism  Procalcitonin negative x 2, afebrile, mild leukopenia  Initially started on ceftriaxone, discontinued  BC with contaminant  Urinary antigens negative  Speech consulted, regular food texture and thin liquids recommended   Received dose of IV lasix, continue home torsemide on discharge  Component of hypoxia likely due to volume overload given noncompliance with home diuretics and cardiac medications  Currently stable on room air, encouraged compliance with home regimen on discharge

## 2025-07-04 NOTE — CASE MANAGEMENT
Case Management Discharge Planning Note    Patient name Gregg Partida  Location 2 [facility] [address] [address] MRN [id number]  : 1960 Date 2025       Current Admission Date: 2025  Current Admission Diagnosis:Acute respiratory failure with hypoxia (HCC)   Patient Active Problem List    Diagnosis Date Noted    Generalized weakness 2025    Suspected aspiration episode 2025    Alcohol intoxication (HCC) 2025    History of Clostridium difficile infection 2025    Sacral wound, initial encounter 2025    Opacity of lung on imaging study 2025    QT prolongation 2025    Abnormal CT scan, cervical spine 2025    Abnormal CXR 2025    Recurrent Clostridioides difficile diarrhea 2025    Abnormal EKG 2025    Essential hypertension 2025    Pancytopenia (HCC) 2025    CKD (chronic kidney disease) stage 2, GFR 60-89 ml/min 2025    Heart failure with preserved ejection fraction (HCC) 2025    Fracture of multiple ribs 2025    Wound of right buttock 2025    Dyspnea on exertion 2025    Head injury 2024    Hypotension 2024    Mucus plugging of bronchi 2024    Acute respiratory failure with hypoxia (HCC) 2024    Hyponatremia 2024    Closed fracture of one rib of left side 2024    Financial insecurity 2024    BPH (benign prostatic hyperplasia) 2024    Chronic alcohol use 2024    Alcoholic intoxication without complication (HCC) 05/10/2024    Hypertension 2024    Hemorrhagic cystitis 2024    Alcohol abuse 2024    Hypothyroidism 2024    Gastrointestinal hemorrhage with melena 10/29/2023    Chest pain 2023    Longstanding persistent atrial fibrillation (HCC) 2023    GERD (gastroesophageal reflux disease) 2023    Stable angina (HCC) 2022    Stage 3a chronic kidney disease (HCC) 2022    Fatty liver, alcoholic 04/15/2022     Nonischemic nontraumatic myocardial injury 04/10/2022    Atrial fibrillation with RVR ([facility]) 10/25/2021    Mild protein-calorie malnutrition ([facility]) 05/13/2021    Metabolic acidosis 04/21/2021    Transaminitis 04/21/2021    Atrial fibrillation ([facility]) 12/05/2020    Chronic heart failure with preserved ejection fraction ([facility]) 12/22/2019    Noncompliance 12/22/2019    SIRS (systemic inflammatory response syndrome) ([facility]) 11/23/2019    Alcohol use disorder 11/23/2019    Cervical spinal stenosis 10/17/2019    Ambulatory dysfunction 10/17/2019    Electrolyte abnormality 10/17/2019    Thrombocytopenia ([facility]) 06/09/2019    History of prostate cancer 06/07/2019    CAD (coronary artery disease) 06/07/2019    Elevated alkaline phosphatase 06/07/2019    Nicotine abuse 06/07/2019    Fall 06/07/2019    Depression, recurrent ([facility]) 10/10/2018    Hx of CABG 10/10/2018    Dyslipidemia 10/10/2018    Hypertensive heart and chronic kidney disease with heart failure and stage 1 through stage 4 chronic kidney disease, or chronic kidney disease ([facility]) 10/09/2018    Type 2 diabetes mellitus, without long-term current use of insulin ([facility]) 10/09/2018    COPD (chronic obstructive pulmonary disease) ([facility]) 10/09/2018      LOS (days): 4  Geometric Mean LOS (GMLOS) (days): 3.5  Days to GMLOS:-0.2     OBJECTIVE:  Risk of Unplanned Readmission Score: 94.08         Current admission status: Inpatient   Preferred Pharmacy:   [address] [address]  Phone: [phone] Fax: [phone]    UNKNOWN - FOLLOW UP PRIOR TO DISCHARGE TO E-PRESCRIBE  No address on file    Primary Care Provider: Lilliana Bliss MD    Primary Insurance: [facility] REP  Secondary Insurance:     DISCHARGE DETAILS:      Requested Home Health Care         Is the patient interested in HHC at discharge?: Yes  Home Health Discipline requested:: Physical Therapy, Occupational Therapy  Home Health Agency  Name:: Other  HHA External Referral Reason (only applicable if external HHA name selected): Services not provided in network or near patient location  Home Health Follow-Up Provider:: PCP  Home Health Services Needed:: Evaluate Functional Status and Safety, Gait/ADL Training, Strengthening/Theraputic Exercises to Improve Function  Homebound Criteria Met:: Requires the Assistance of Another Person for Safe Ambulation or to Leave the Home  Supporting Clincal Findings:: Limited Endurance    DME Referral Provided  Referral made for DME?: No    Other Referral/Resources/Interventions Provided:  Interventions: HHC  Referral Comments: Referral sent in aidin for C, pending acceptance. RN CM to request Lyft at discharge.     Treatment Team Recommendation: Home with Home Health Care, Short Term Rehab  Expected Discharge Disposition: Short Term Rehab  Additional Discharge Dispositions: Home or Self Care, Home Health Services, Short Term Rehab  Transport at Discharge : Ride Share

## 2025-07-04 NOTE — PLAN OF CARE
Problem: Potential for Falls  Goal: Patient will remain free of falls  Description: INTERVENTIONS:  - Educate patient/family on patient safety including physical limitations  - Instruct patient to call for assistance with activity   - Consider consulting OT/PT to assist with strengthening/mobility based on AM PAC & JH-HLM score  - Consult OT/PT to assist with strengthening/mobility   - Keep Call bell within reach  - Keep bed low and locked with side rails adjusted as appropriate  - Keep care items and personal belongings within reach  - Initiate and maintain comfort rounds  - Make Fall Risk Sign visible to staff  - Offer Toileting every 2 Hours, in advance of need  - Initiate/Maintain bed alarm  - Obtain necessary fall risk management equipment: walker  - Apply yellow socks and bracelet for high fall risk patients  - Consider moving patient to room near nurses station  Outcome: Progressing     Problem: RESPIRATORY - ADULT  Goal: Achieves optimal ventilation and oxygenation  Description: INTERVENTIONS:  - Assess for changes in respiratory status  - Assess for changes in mentation and behavior  - Position to facilitate oxygenation and minimize respiratory effort  - Oxygen administered by appropriate delivery if ordered  - Initiate smoking cessation education as indicated  - Encourage broncho-pulmonary hygiene including cough, deep breathe, Incentive Spirometry  - Assess the need for suctioning and aspirate as needed  - Assess and instruct to report SOB or any respiratory difficulty  - Respiratory Therapy support as indicated  Outcome: Progressing     Problem: Nutrition/Hydration-ADULT  Goal: Nutrient/Hydration intake appropriate for improving, restoring or maintaining nutritional needs  Description: Monitor and assess patient's nutrition/hydration status for malnutrition. Collaborate with interdisciplinary team and initiate plan and interventions as ordered.  Monitor patient's weight and dietary intake as ordered or  per policy. Utilize nutrition screening tool and intervene as necessary. Determine patient's food preferences and provide high-protein, high-caloric foods as appropriate.     INTERVENTIONS:  - Monitor oral intake, urinary output, labs, and treatment plans  - Assess nutrition and hydration status and recommend course of action  - Evaluate amount of meals eaten  - Assist patient with eating if necessary   - Allow adequate time for meals  - Recommend/ encourage appropriate diets, oral nutritional supplements, and vitamin/mineral supplements  - Order, calculate, and assess calorie counts as needed  - Recommend, monitor, and adjust tube feedings and TPN/PPN based on assessed needs  - Assess need for intravenous fluids  - Provide specific nutrition/hydration education as appropriate  - Include patient/family/caregiver in decisions related to nutrition  Outcome: Progressing     Problem: Prexisting or High Potential for Compromised Skin Integrity  Goal: Skin integrity is maintained or improved  Description: INTERVENTIONS:  - Identify patients at risk for skin breakdown  - Assess and monitor skin integrity including under and around medical devices   - Assess and monitor nutrition and hydration status  - Monitor labs  - Assess for incontinence   - Turn and reposition patient  - Assist with mobility/ambulation  - Relieve pressure over eduardo prominences   - Avoid friction and shearing  - Provide appropriate hygiene as needed including keeping skin clean and dry  - Evaluate need for skin moisturizer/barrier cream  - Collaborate with interdisciplinary team  - Patient/family teaching  - Consider wound care consult    Assess:  - Review Herrera scale daily  - Clean and moisturize skin every shift  - Inspect skin when repositioning, toileting, and assisting with ADLS  - Assess under medical devices such as  maria fernanda every shift  - Assess extremities for adequate circulation and sensation     Bed Management:  - Have minimal linens on  bed & keep smooth, unwrinkled  - Change linens as needed when moist or perspiring  - Avoid sitting or lying in one position for more than 2 hours while in bed?Keep HOB at 30 degrees   - Toileting:  - Offer bedside commode  - Assess for incontinence every shift  - Use incontinent care products after each incontinent episode such as wipes    Activity:  - Mobilize patient 3 times a day  - Encourage activity and walks on unit  - Encourage or provide ROM exercises   - Turn and reposition patient every 2 Hours  - Use appropriate equipment to lift or move patient in bed  - Instruct/ Assist with weight shifting every hour when out of bed in chair  - Consider limitation of chair time 2 hour intervals    Skin Care:  - Avoid use of baby powder, tape, friction and shearing, hot water or constrictive clothing  - Relieve pressure over bony prominences using mepilex  - Do not massage red bony areas    Next Steps:  - Teach patient strategies to minimize risks such as turn and reposition  - Consider consults to  interdisciplinary teams such as PT  Outcome: Progressing

## 2025-07-04 NOTE — ASSESSMENT & PLAN NOTE
Frequent ED visits/admissions due to alcohol intoxication and withdrawal  Overnight 7/2 noted to be agitated causing high CIWA score and 1:1 observation; patient states this was because he wanted to leave and was not allowed  Patient calm and cooperative today, low CIWA scores, symptoms improved  Thiamine, folic acid, multivitamin  Encouraged complete alcohol cessation

## 2025-07-05 ENCOUNTER — HOSPITAL ENCOUNTER (EMERGENCY)
Facility: HOSPITAL | Age: 65
Discharge: HOME | End: 2025-07-06
Attending: EMERGENCY MEDICINE | Admitting: STUDENT IN AN ORGANIZED HEALTH CARE EDUCATION/TRAINING PROGRAM
Payer: COMMERCIAL

## 2025-07-05 DIAGNOSIS — F10.929 ALCOHOL INTOXICATION (HCC): Primary | ICD-10-CM

## 2025-07-05 PROBLEM — A04.71 RECURRENT CLOSTRIDIOIDES DIFFICILE DIARRHEA: Status: RESOLVED | Noted: 2025-05-06 | Resolved: 2025-07-05

## 2025-07-05 LAB — MRSA NOSE QL CULT: NORMAL

## 2025-07-05 PROCEDURE — 99283 EMERGENCY DEPT VISIT LOW MDM: CPT | Performed by: EMERGENCY MEDICINE

## 2025-07-05 PROCEDURE — 99284 EMERGENCY DEPT VISIT MOD MDM: CPT

## 2025-07-06 VITALS
DIASTOLIC BLOOD PRESSURE: 81 MMHG | HEART RATE: 92 BPM | OXYGEN SATURATION: 92 % | TEMPERATURE: 96.6 F | RESPIRATION RATE: 18 BRPM | SYSTOLIC BLOOD PRESSURE: 132 MMHG

## 2025-07-06 LAB — BACTERIA BLD CULT: NORMAL

## 2025-07-06 NOTE — DISCHARGE INSTRUCTIONS
Please follow-up with your family doctor.  Return to the emergency room for any new or concerning symptoms.

## 2025-07-06 NOTE — ED PROVIDER NOTES
"Final Diagnosis:  No diagnosis found.    Chief Complaint   Patient presents with    Weakness - Generalized     Patient recently d/c from hospital, reports has been drinking and not taking his medication       HPI  Pt arrives intoxicated    Was d/c from hospital went home drank and came back    I ask him \"what's going on\" he says \"nothing, how've you been\"    We talk about my recent vacation to [place]    He has no complaints  He has no one to pick him up    He has no new signs of trauma on his body.     EMS additionally reports:     - Previous charting underwent limited review with attention to last ED visits, labs, ekgs, and prior imaging.  Chart review reveals :     No results displayed because visit has over 200 results.          - No language barrier.   - History obtained from patient    - Discuss patient's care, with patient permission or by chart review, with      PMH:   has a past medical history of Acute encephalopathy (06/07/2025), Acute on chronic kidney failure  (HCC), Alcohol abuse, Alcohol withdrawal ([facility]) (10/09/2018), Atrial fibrillation ([facility]), Cancer ([facility]), Cardiac disease, COPD (chronic obstructive pulmonary disease) ([facility]), Coronary artery disease, Elevated troponin (09/28/2023), ETOH abuse, Heart failure ([facility]), History of heart surgery, heart artery stent, Hyperlipidemia, Hypertension, Hypoglycemia (06/05/2025), Hyponatremia (10/17/2019), Hypovolemic shock ([facility]) (12/22/2019), Increased anion gap (04/21/2021), Lumbar spondylitis ([facility]) (10/13/2022), Metabolic acidosis, increased anion gap (04/21/2021), Nasal bone fracture (10/10/2022), Prostate CA ([facility]), S/P CABG x 3, and Sleep apnea.    PSH:   has a past surgical history that includes Coronary artery bypass graft; Tonsillectomy; Coronary artery bypass graft (2004); pr arthrd ant interbody min dsc crv below c2 (N/A, 12/16/2020); and Cardiac catheterization.     Social History:  Tobacco Use: High Risk (7/5/2025)    Patient History     Smoking Tobacco " Use: Every Day     Smokeless Tobacco Use: Never     Passive Exposure: Not on file     Alcohol Use: Alcohol Misuse (10/17/2022)    AUDIT-C     Frequency of Alcohol Consumption: 4 or more times a week     Average Number of Drinks: 10 or more     Frequency of Binge Drinking: Daily or almost daily     No illicit use       ROS:  Pertinent positives/negatives: .     Some ROS may be present in the HPI and would take precedent over these standard questions asked below.   Review of Systems   Unable to perform ROS: Mental status change          PE:     Physical exam highlights:   Physical Exam       Vitals:    07/05/25 2300   BP: (!) 88/56   BP Location: Right arm   Pulse: 93   Temp: (!) 96.6 °F (35.9 °C)   TempSrc: Oral   SpO2: 90%     Vitals reviewed by me.   Nursing note reviewed  Chaperone present for all sensitive exam.  Const: No acute distress. Alert. Nontoxic. Not diaphoretic. Intoxicated.    HEENT: External ears normal. No protrusion drainage swelling. Nose normal. No drainage/traumatic deformity. MM. Mouth with baseline/symmetric movement. No trismus.   Eyes: No squinting. No icterus. No tearing/swelling/drainage. Tracks through the room with normal EOM.   Neck: ROM normal. No rigidity. No meningismus.  Cards: Rate as per vitals Compared to monitor sinus unless documented. Regular Well perfused.  Pulm: Effort and excursion normal. No distress. No audible wheezing/no stridor. Normal resp rate without retraction or change in work of breathing.  Abd: No distension beyond baseline. No fluctuant wave. Patient without peritoneal pain with shifting/bumping the bed.   MSK: ROM normal baseline. No deformity. No contractures from baseline.   Skin: No new rashes visible. Well perfused. No wounds visualized on exposed skin  Neuro: Nonfocal. Baseline. CN grossly intact. Moving all four with coordination.   Psych: Normal behavior and affect.        A:  - Nursing note reviewed.    Ddx and MDM  Considered diagnoses    Alcohol  intoxication    Monitor for sobriety    Can assess need for chronic afib meds and desire for rehab pending sobriety.         Dispo decision       My conversation with consultant reveals:       Decision rules:                           My read of the XR/CT scan reveals:     No orders to display       No orders of the defined types were placed in this encounter.    Labs Reviewed - No data to display    *Each of these labs was reviewed. Particular standout labs will be noted in the ED Course above     Final Diagnosis:  No diagnosis found.      P:  - hospital tx includes   Medications - No data to display      - disposition  ED Disposition       None          Follow-up Information    None         - patient will call their PCP to let them know they were in the emergency department. We discuss return precautions and patient is agreeable with plan and aformentioned disposition.       - additional treatment intended, if consistent with primary provider:  - patient to follow with :      Patient's Medications   Discharge Prescriptions    No medications on file     No discharge procedures on file.  Prior to Admission Medications   Prescriptions Last Dose Informant Patient Reported? Taking?   Blood Glucose Monitoring Suppl (ONE TOUCH ULTRA MINI) w/Device KIT  Self Yes No   Sig: Use as directed   Patient not taking: Reported on 6/5/2025   Blood Pressure Monitoring (Adult Blood Pressure Cuff Lg) KIT   No No   Sig: Use in the morning   Patient not taking: Reported on 6/5/2025   ONETOUCH DELICA LANCETS FINE MISC  Self Yes No   Sig: in the morning and in the evening and before bedtime. Test.   Patient not taking: Reported on 6/5/2025   Thiamine Mononitrate (VITAMIN B1) 100 mg tablet   No No   Sig: Take 1 tablet (100 mg total) by mouth in the morning.   albuterol (2.5 mg/3 mL) 0.083 % nebulizer solution   No No   Sig: Take 3 mL (2.5 mg total) by nebulization 3 (three) times a day   albuterol (PROVENTIL HFA,VENTOLIN HFA) 90 mcg/act  inhaler   No No   Sig: Inhale 2 puffs every 6 (six) hours as needed for wheezing or shortness of breath   apixaban (Eliquis) 5 mg   No No   Sig: Take 1 tablet (5 mg total) by mouth in the morning and 1 tablet (5 mg total) in the evening.   aspirin 81 mg chewable tablet   No No   Sig: Chew 1 tablet (81 mg total) in the morning.   atorvastatin (LIPITOR) 40 mg tablet   No No   Sig: Take 1 tablet (40 mg total) by mouth in the morning.   diltiazem (CARDIZEM SR) 120 mg 12 hr capsule   No No   Sig: Take 1 capsule (120 mg total) by mouth in the morning and 1 capsule (120 mg total) in the evening.   ferrous sulfate 325 (65 Fe) mg tablet   No No   Sig: Take 1 tablet (325 mg total) by mouth daily with breakfast   fluticasone-vilanterol 200-25 mcg/actuation inhaler   No No   Sig: Inhale 1 puff in the morning. Rinse mouth after use.   folic acid (FOLVITE) 400 mcg tablet   No No   Sig: Take 1 tablet (400 mcg total) by mouth in the morning.   gabapentin (NEURONTIN) 300 mg capsule   No No   Sig: Take 1 capsule (300 mg total) by mouth in the morning and 1 capsule (300 mg total) in the evening and 1 capsule (300 mg total) before bedtime.   metoprolol succinate (TOPROL-XL) 100 mg 24 hr tablet   No No   Sig: Take 1 tablet (100 mg total) by mouth daily   pantoprazole (PROTONIX) 40 mg tablet   No No   Sig: Take 1 tablet (40 mg total) by mouth in the morning.   ranolazine (RANEXA) 500 mg 12 hr tablet   No No   Sig: Take 1 tablet (500 mg total) by mouth in the morning and 1 tablet (500 mg total) in the evening.   saccharomyces boulardii (FLORASTOR) 250 mg capsule   No No   Sig: Take 1 capsule (250 mg total) by mouth in the morning and 1 capsule (250 mg total) in the evening.   tamsulosin (FLOMAX) 0.4 mg   No No   Sig: Take 1 capsule (0.4 mg total) by mouth daily with dinner   torsemide (DEMADEX) 10 mg tablet   No No   Sig: Take 1 tablet (10 mg total) by mouth every other day   vancomycin (VANCOCIN) 125 MG capsule   No No   Sig: Take 1  "capsule (125 mg total) by mouth every 12 (twelve) hours for 3 days      Facility-Administered Medications: None       Portions of the record may have been created with voice recognition software. Occasional wrong word or \"sound a like\" substitutions may have occurred due to the inherent limitations of voice recognition software. Read the chart carefully and recognize, using context, where substitutions have occurred.    Electronically signed by:  MD Fito Franco MD  07/08/25 0155    "

## 2025-07-08 ENCOUNTER — TRANSITIONAL CARE MANAGEMENT (OUTPATIENT)
Age: 65
End: 2025-07-08

## 2025-07-10 ENCOUNTER — APPOINTMENT (EMERGENCY)
Dept: RADIOLOGY | Facility: HOSPITAL | Age: 65
End: 2025-07-10
Payer: COMMERCIAL

## 2025-07-10 ENCOUNTER — HOSPITAL ENCOUNTER (EMERGENCY)
Facility: HOSPITAL | Age: 65
Discharge: HOME | End: 2025-07-10
Attending: EMERGENCY MEDICINE
Payer: COMMERCIAL

## 2025-07-10 VITALS
DIASTOLIC BLOOD PRESSURE: 81 MMHG | SYSTOLIC BLOOD PRESSURE: 132 MMHG | HEART RATE: 92 BPM | RESPIRATION RATE: 18 BRPM | OXYGEN SATURATION: 95 % | TEMPERATURE: 98 F

## 2025-07-10 DIAGNOSIS — F10.929 ALCOHOL INTOXICATION (HCC): Primary | ICD-10-CM

## 2025-07-10 DIAGNOSIS — S69.92XA INJURY OF LEFT HAND, INITIAL ENCOUNTER: ICD-10-CM

## 2025-07-10 PROCEDURE — 99285 EMERGENCY DEPT VISIT HI MDM: CPT

## 2025-07-10 PROCEDURE — 99284 EMERGENCY DEPT VISIT MOD MDM: CPT | Performed by: EMERGENCY MEDICINE

## 2025-07-10 PROCEDURE — 73130 X-RAY EXAM OF HAND: CPT

## 2025-07-10 NOTE — ED PROVIDER NOTES
Time reflects when diagnosis was documented in both MDM as applicable and the Disposition within this note       Time User Action Codes Description Comment    7/10/2025  9:35 PM Mina Muñoz Add [F10.929] Alcohol intoxication (HCC)     7/10/2025  9:35 PM Mina Muñoz Add [S69.92XA] Injury of left hand, initial encounter           ED Disposition       ED Disposition   Discharge    Condition   Stable    Date/Time   u Jul 10, 2025  9:35 PM    Comment   Gregg Partida discharge to home/self care.                   Assessment & Plan       Medical Decision Making  65-year-old male, presents by EMS from home with complaints of left hand swelling and pain.  Differential diagnosis includes fracture, contusion, sprain among other diagnoses.  Previous medical records reviewed, patient with frequent ED visits for alcohol intoxication, patient is currently intoxicated and admits to drinking alcohol.  X-ray ordered, will continue to monitor in ED and reevaluate.    Patient monitored in ED, awake and alert, able to ambulate without difficulty.  Transportation arranged in ED to take patient home.    Amount and/or Complexity of Data Reviewed  Independent Historian: EMS  External Data Reviewed: notes.  Radiology: ordered and independent interpretation performed. Decision-making details documented in ED Course.        ED Course as of 07/11/25 0057   Thu Jul 10, 2025   1901 X-ray left hand independently reviewed myself, no fracture or dislocation noted.       Medications - No data to display    ED Risk Strat Scores                    No data recorded                            History of Present Illness       Chief Complaint   Patient presents with    Hand Pain    Fall     Pt fell yest and today freq falls, hx of alcohol abuse. Pt has l hand pain/swelling       Past Medical History[1]   Past Surgical History[2]   Family History[3]   Social History[4]   E-Cigarette/Vaping    E-Cigarette Use Never User       E-Cigarette/Vaping  Substances    Nicotine Yes     THC No     CBD No     Flavoring No     Other No     Unknown No       I have reviewed and agree with the history as documented.     65-year-old male, history of alcohol abuse, presents by EMS from home with swelling in left hand.  Patient is intoxicated and admits to recent alcohol use, states he fell and injured left hand, unsure when.  Called ambulance today because he noticed his left hand was swollen.  Patient denies any other complaints.  Patient with history of frequent ED visits for alcohol intoxication.      History provided by:  Patient and EMS personnel  Hand Pain  Fall      Review of Systems   Constitutional: Negative.    Respiratory: Negative.     Cardiovascular: Negative.    Gastrointestinal: Negative.            Objective       ED Triage Vitals [07/10/25 1817]   Temperature Pulse Blood Pressure Respirations SpO2 Patient Position - Orthostatic VS   98 °F (36.7 °C) 92 132/81 18 95 % --      Temp Source Heart Rate Source BP Location FiO2 (%) Pain Score    Oral -- -- -- --      Vitals      Date and Time Temp Pulse SpO2 Resp BP Pain Score FACES Pain Rating User   07/10/25 1817 98 °F (36.7 °C) -- -- -- -- -- -- MARICRUZ   07/10/25 1817 -- 92 95 % 18 132/81 -- -- BALJEET            Physical Exam  Vitals and nursing note reviewed.   Constitutional:       General: He is not in acute distress.  HENT:      Head: Normocephalic and atraumatic.     Eyes:      Extraocular Movements: Extraocular movements intact.      Pupils: Pupils are equal, round, and reactive to light.       Cardiovascular:      Rate and Rhythm: Normal rate and regular rhythm.   Pulmonary:      Breath sounds: Normal breath sounds.   Abdominal:      Palpations: Abdomen is soft.      Tenderness: There is no abdominal tenderness.     Musculoskeletal:         General: Normal range of motion.      Cervical back: Normal range of motion and neck supple. No rigidity or tenderness.      Comments: Left hand with diffuse dorsal swelling.   Full range of motion in all fingers.     Skin:     General: Skin is warm and dry.     Neurological:      General: No focal deficit present.      Mental Status: He is alert.         Results Reviewed       None            XR hand 3+ views LEFT   ED Interpretation by Mina Muñoz MD (07/10 1901)   No fracture          Procedures    ED Medication and Procedure Management   Prior to Admission Medications   Prescriptions Last Dose Informant Patient Reported? Taking?   Blood Glucose Monitoring Suppl (ONE TOUCH ULTRA MINI) w/Device KIT  Self Yes No   Sig: Use as directed   Patient not taking: Reported on 6/5/2025   Blood Pressure Monitoring (Adult Blood Pressure Cuff Lg) KIT   No No   Sig: Use in the morning   Patient not taking: Reported on 6/5/2025   ONETOUCH DELICA LANCETS FINE MISC  Self Yes No   Sig: in the morning and in the evening and before bedtime. Test.   Patient not taking: Reported on 6/5/2025   Thiamine Mononitrate (VITAMIN B1) 100 mg tablet   No No   Sig: Take 1 tablet (100 mg total) by mouth in the morning.   albuterol (2.5 mg/3 mL) 0.083 % nebulizer solution   No No   Sig: Take 3 mL (2.5 mg total) by nebulization 3 (three) times a day   albuterol (PROVENTIL HFA,VENTOLIN HFA) 90 mcg/act inhaler   No No   Sig: Inhale 2 puffs every 6 (six) hours as needed for wheezing or shortness of breath   apixaban (Eliquis) 5 mg   No No   Sig: Take 1 tablet (5 mg total) by mouth in the morning and 1 tablet (5 mg total) in the evening.   aspirin 81 mg chewable tablet   No No   Sig: Chew 1 tablet (81 mg total) in the morning.   atorvastatin (LIPITOR) 40 mg tablet   No No   Sig: Take 1 tablet (40 mg total) by mouth in the morning.   diltiazem (CARDIZEM SR) 120 mg 12 hr capsule   No No   Sig: Take 1 capsule (120 mg total) by mouth in the morning and 1 capsule (120 mg total) in the evening.   ferrous sulfate 325 (65 Fe) mg tablet   No No   Sig: Take 1 tablet (325 mg total) by mouth daily with breakfast    fluticasone-vilanterol 200-25 mcg/actuation inhaler   No No   Sig: Inhale 1 puff in the morning. Rinse mouth after use.   folic acid (FOLVITE) 400 mcg tablet   No No   Sig: Take 1 tablet (400 mcg total) by mouth in the morning.   gabapentin (NEURONTIN) 300 mg capsule   No No   Sig: Take 1 capsule (300 mg total) by mouth in the morning and 1 capsule (300 mg total) in the evening and 1 capsule (300 mg total) before bedtime.   metoprolol succinate (TOPROL-XL) 100 mg 24 hr tablet   No No   Sig: Take 1 tablet (100 mg total) by mouth daily   pantoprazole (PROTONIX) 40 mg tablet   No No   Sig: Take 1 tablet (40 mg total) by mouth in the morning.   ranolazine (RANEXA) 500 mg 12 hr tablet   No No   Sig: Take 1 tablet (500 mg total) by mouth in the morning and 1 tablet (500 mg total) in the evening.   saccharomyces boulardii (FLORASTOR) 250 mg capsule   No No   Sig: Take 1 capsule (250 mg total) by mouth in the morning and 1 capsule (250 mg total) in the evening.   tamsulosin (FLOMAX) 0.4 mg   No No   Sig: Take 1 capsule (0.4 mg total) by mouth daily with dinner   torsemide (DEMADEX) 10 mg tablet   No No   Sig: Take 1 tablet (10 mg total) by mouth every other day      Facility-Administered Medications: None     Discharge Medication List as of 7/10/2025 10:49 PM        CONTINUE these medications which have NOT CHANGED    Details   albuterol (2.5 mg/3 mL) 0.083 % nebulizer solution Take 3 mL (2.5 mg total) by nebulization 3 (three) times a day, Starting Th 6/12/2025, Normal      albuterol (PROVENTIL HFA,VENTOLIN HFA) 90 mcg/act inhaler Inhale 2 puffs every 6 (six) hours as needed for wheezing or shortness of breath, Starting Thu 6/12/2025, Normal      apixaban (Eliquis) 5 mg Take 1 tablet (5 mg total) by mouth in the morning and 1 tablet (5 mg total) in the evening., Starting Thu 6/12/2025, Normal      aspirin 81 mg chewable tablet Chew 1 tablet (81 mg total) in the morning., Starting Thu 6/12/2025, Normal      atorvastatin  (LIPITOR) 40 mg tablet Take 1 tablet (40 mg total) by mouth in the morning., Starting Th 6/12/2025, Normal      Blood Glucose Monitoring Suppl (ONE TOUCH ULTRA MINI) w/Device KIT Use as directed, Starting Thu 5/16/2019, Historical Med      Blood Pressure Monitoring (Adult Blood Pressure Cuff Lg) KIT Use in the morning, Starting Th 12/14/2023, Normal      diltiazem (CARDIZEM SR) 120 mg 12 hr capsule Take 1 capsule (120 mg total) by mouth in the morning and 1 capsule (120 mg total) in the evening., Starting Thu 6/12/2025, Normal      ferrous sulfate 325 (65 Fe) mg tablet Take 1 tablet (325 mg total) by mouth daily with breakfast, Starting Thu 6/12/2025, Normal      fluticasone-vilanterol 200-25 mcg/actuation inhaler Inhale 1 puff in the morning. Rinse mouth after use., Starting Thu 6/12/2025, Normal      folic acid (FOLVITE) 400 mcg tablet Take 1 tablet (400 mcg total) by mouth in the morning., Starting Thu 6/12/2025, Normal      gabapentin (NEURONTIN) 300 mg capsule Take 1 capsule (300 mg total) by mouth in the morning and 1 capsule (300 mg total) in the evening and 1 capsule (300 mg total) before bedtime., Starting Thu 6/12/2025, Normal      metoprolol succinate (TOPROL-XL) 100 mg 24 hr tablet Take 1 tablet (100 mg total) by mouth daily, Starting Th 6/12/2025, Normal      ONETOUCH DELICA LANCETS FINE MISC in the morning and in the evening and before bedtime. Test., Starting Thu 5/16/2019, Historical Med      pantoprazole (PROTONIX) 40 mg tablet Take 1 tablet (40 mg total) by mouth in the morning., Starting Thu 6/12/2025, Normal      ranolazine (RANEXA) 500 mg 12 hr tablet Take 1 tablet (500 mg total) by mouth in the morning and 1 tablet (500 mg total) in the evening., Starting Thu 6/12/2025, Normal      saccharomyces boulardii (FLORASTOR) 250 mg capsule Take 1 capsule (250 mg total) by mouth in the morning and 1 capsule (250 mg total) in the evening., Starting u 6/12/2025, Normal      tamsulosin (FLOMAX) 0.4 mg  Take 1 capsule (0.4 mg total) by mouth daily with dinner, Starting Thu 6/12/2025, Normal      Thiamine Mononitrate (VITAMIN B1) 100 mg tablet Take 1 tablet (100 mg total) by mouth in the morning., Starting Thu 6/12/2025, No Print      torsemide (DEMADEX) 10 mg tablet Take 1 tablet (10 mg total) by mouth every other day, Starting Thu 6/12/2025, Until Sat 7/12/2025, Normal           No discharge procedures on file.  ED SEPSIS DOCUMENTATION   Time reflects when diagnosis was documented in both MDM as applicable and the Disposition within this note       Time User Action Codes Description Comment    7/10/2025  9:35 PM Mina Muñoz Add [F10.929] Alcohol intoxication (HCC)     7/10/2025  9:35 PM Mina Muñoz [S69.92XA] Injury of left hand, initial encounter                    [1]   Past Medical History:  Diagnosis Date    Acute encephalopathy 06/07/2025    Acute on chronic kidney failure  (HCC)     Alcohol abuse     Alcohol withdrawal (HCC) 10/09/2018    Atrial fibrillation (HCC)     Cancer (HCC)     prostate ca,had radiation    Cardiac disease     stents,then triple bypass    COPD (chronic obstructive pulmonary disease) (HCC)     Coronary artery disease     Elevated troponin 09/28/2023    ETOH abuse     Heart failure (HCC)     History of heart surgery     says triple bypass [facility]    Hx of heart artery stent     2014    Hyperlipidemia     Hypertension     Hypoglycemia 06/05/2025    Hyponatremia 10/17/2019    Hypovolemic shock (HCC) 12/22/2019    Increased anion gap 04/21/2021    Lumbar spondylitis (HCC) 10/13/2022    Metabolic acidosis, increased anion gap 04/21/2021    Nasal bone fracture 10/10/2022    Prostate CA (HCC)     S/P CABG x 3     2004    Sleep apnea    [2]   Past Surgical History:  Procedure Laterality Date    CARDIAC CATHETERIZATION      2 stents    CORONARY ARTERY BYPASS GRAFT      CORONARY ARTERY BYPASS GRAFT  2004    [place] ARTHRD ANT INTERBODY MIN DSC CRV BELOW C2 N/A 12/16/2020     "Procedure: Anterior cervical discectomy with fusion C4-C7; Posterior cervical decompression and fusion C2-T2;  Surgeon: David Rowell MD;  Location: [facility];  Service: Neurosurgery    TONSILLECTOMY     [3]   Family History  Problem Relation Name Age of Onset    Diabetes Mother      Uterine cancer Mother      COPD Father      Hypertension Father     [4]   Social History  Tobacco Use    Smoking status: Every Day     Current packs/day: 1.50     Average packs/day: 1.5 packs/day for 40.0 years (60.0 ttl pk-yrs)     Types: Cigarettes    Smokeless tobacco: Never   Vaping Use    Vaping status: Never Used   Substance Use Topics    Alcohol use: Yes     Alcohol/week: 4.0 standard drinks of alcohol     Types: 4 Standard drinks or equivalent per week     Comment: \"quart a day\"    Drug use: No        Mina Muñoz MD  07/11/25 0057    "

## 2025-07-11 NOTE — DISCHARGE INSTRUCTIONS
Patient Education     Alcohol Intoxication ED   General Information   You came to the Emergency Department (ED) for alcohol intoxication. This happens when someone drinks too much in a short amount of time. Alcohol intoxication is the same as being drunk. Severe alcohol intoxication is known as alcohol poisoning. It can make you pass out and can be life threatening if you stop breathing or choke on your own vomit. Any amount of alcohol is harmful to a child. They can have poisoning even with a low amount of alcohol in their body.  What care is needed at home?   Call your regular doctor to let them know you were in the ED. Make a follow-up appointment if you were told to. Your doctor can help you figure out if you have a problem with alcohol and what kind of help you might need.  To help you figure out if your drinking is a problem, think about these questions:  Have you lost control of your drinking? Do you find that you sometimes drink more than you meant to?  Do you need to drink more and more to feel an effect? Do you feel sick or uncomfortable if you cut back on drinking?  Has your drinking caused you to lose your job, get into trouble with the law, or have relationship problems?  If you do think you have a problem with alcohol, your doctor can talk to you about treatments. There are also things you can do on your own:  Keep a drink journal. Write down how much you drink, where you were, and things that may have triggered your drinking. This can help you learn more about your drinking patterns and make changes.  Some people choose to stop drinking alcohol completely. If you drink alcohol often, check with your doctor before you stop drinking completely. Stopping or cutting back suddenly can be harmful for people who are used to drinking a lot.  You may choose to go to a program aimed at stopping drinking like Alcoholics Anonymous or seek help from a counselor. Talking to others can help you figure out better  ways to handle stress or other problems you are having.  If you do drink, there are things you can do to stay safe and avoid getting alcohol intoxication again:  Limit how much you drink or alternate your drinks with a glass of water or other drink that does have alcohol.  Do not drink on an empty stomach. Food may help slow down absorption.  Do not drive if you have been drinking.  When do I need to get emergency help?   Call for an ambulance right away if:   You or someone else has been drinking alcohol and has signs of alcohol poisoning, such as:  Very slow, shallow, or irregular breathing. Slow breathing is less than 8 breaths in a minute. Irregular breathing includes pauses of 10 or more seconds between breaths.  Choking.  Speech is very slurred or makes no sense.  Skin that looks blue or pale or feels cool to the touch.  Not being able to wake up.  Seizures.  Return to the ED if:   You have milder signs of alcohol withdrawal like:  Feeling anxious, restless, or having trouble sleeping.  Shaking, sweating, or feeling like your heart is racing.  Headache.  Craving alcohol.  Loss of appetite, an upset stomach, or throwing up.  When do I need to call the doctor?   You are not able to remember periods of time after drinking or you black out when you drink.  You feel like you need to cut down on your drinking or feel guilty about drinking but don't know how to stop.  You have new or worsening symptoms.  Last Reviewed Date   2020-10-16  Consumer Information Use and Disclaimer   This generalized information is a limited summary of diagnosis, treatment, and/or medication information. It is not meant to be comprehensive and should be used as a tool to help the user understand and/or assess potential diagnostic and treatment options. It does NOT include all information about conditions, treatments, medications, side effects, or risks that may apply to a specific patient. It is not intended to be medical advice or a  substitute for the medical advice, diagnosis, or treatment of a health care provider based on the health care provider's examination and assessment of a patient’s specific and unique circumstances. Patients must speak with a health care provider for complete information about their health, medical questions, and treatment options, including any risks or benefits regarding use of medications. This information does not endorse any treatments or medications as safe, effective, or approved for treating a specific patient. [employer] and its affiliates disclaim any warranty or liability relating to this information or the use thereof. The use of this information is governed by the Terms of Use, available at https://www.[employer].com/en/know/clinical-effectiveness-terms   Copyright   Copyright © 2024 [employer] and its affiliates and/or licensors. All rights reserved.

## 2025-07-12 ENCOUNTER — HOSPITAL ENCOUNTER (EMERGENCY)
Facility: HOSPITAL | Age: 65
Discharge: HOME | DRG: 641 | End: 2025-07-13
Attending: STUDENT IN AN ORGANIZED HEALTH CARE EDUCATION/TRAINING PROGRAM | Admitting: STUDENT IN AN ORGANIZED HEALTH CARE EDUCATION/TRAINING PROGRAM
Payer: COMMERCIAL

## 2025-07-12 ENCOUNTER — APPOINTMENT (EMERGENCY)
Dept: RADIOLOGY | Facility: HOSPITAL | Age: 65
DRG: 641 | End: 2025-07-12
Payer: COMMERCIAL

## 2025-07-12 VITALS
DIASTOLIC BLOOD PRESSURE: 66 MMHG | TEMPERATURE: 97.6 F | HEART RATE: 85 BPM | SYSTOLIC BLOOD PRESSURE: 114 MMHG | RESPIRATION RATE: 18 BRPM | OXYGEN SATURATION: 98 %

## 2025-07-12 DIAGNOSIS — F10.929 ALCOHOL INTOXICATION (HCC): Primary | ICD-10-CM

## 2025-07-12 PROCEDURE — 96375 TX/PRO/DX INJ NEW DRUG ADDON: CPT

## 2025-07-12 PROCEDURE — 96368 THER/DIAG CONCURRENT INF: CPT

## 2025-07-12 PROCEDURE — 70450 CT HEAD/BRAIN W/O DYE: CPT

## 2025-07-12 PROCEDURE — 99284 EMERGENCY DEPT VISIT MOD MDM: CPT | Performed by: STUDENT IN AN ORGANIZED HEALTH CARE EDUCATION/TRAINING PROGRAM

## 2025-07-12 PROCEDURE — 96365 THER/PROPH/DIAG IV INF INIT: CPT

## 2025-07-12 PROCEDURE — 73130 X-RAY EXAM OF HAND: CPT

## 2025-07-12 PROCEDURE — 72125 CT NECK SPINE W/O DYE: CPT

## 2025-07-12 PROCEDURE — 99284 EMERGENCY DEPT VISIT MOD MDM: CPT

## 2025-07-12 PROCEDURE — 96366 THER/PROPH/DIAG IV INF ADDON: CPT

## 2025-07-12 RX ORDER — SODIUM CHLORIDE, SODIUM GLUCONATE, SODIUM ACETATE, POTASSIUM CHLORIDE, MAGNESIUM CHLORIDE, SODIUM PHOSPHATE, DIBASIC, AND POTASSIUM PHOSPHATE .53; .5; .37; .037; .03; .012; .00082 G/100ML; G/100ML; G/100ML; G/100ML; G/100ML; G/100ML; G/100ML
1000 INJECTION, SOLUTION INTRAVENOUS ONCE
Status: DISCONTINUED | OUTPATIENT
Start: 2025-07-12 | End: 2025-07-13 | Stop reason: HOSPADM

## 2025-07-12 RX ADMIN — FOLIC ACID 1 MG: 5 INJECTION, SOLUTION INTRAMUSCULAR; INTRAVENOUS; SUBCUTANEOUS at 23:54

## 2025-07-12 RX ADMIN — THIAMINE HYDROCHLORIDE 100 MG: 100 INJECTION, SOLUTION INTRAMUSCULAR; INTRAVENOUS at 20:49

## 2025-07-12 RX ADMIN — DEXTROSE AND SODIUM CHLORIDE 1000 ML: 5; .9 INJECTION, SOLUTION INTRAVENOUS at 20:19

## 2025-07-13 ENCOUNTER — HOSPITAL ENCOUNTER (EMERGENCY)
Facility: HOSPITAL | Age: 65
Discharge: HOME | DRG: 641 | End: 2025-07-13
Attending: EMERGENCY MEDICINE | Admitting: EMERGENCY MEDICINE
Payer: COMMERCIAL

## 2025-07-13 ENCOUNTER — HOSPITAL ENCOUNTER (EMERGENCY)
Facility: HOSPITAL | Age: 65
Discharge: HOME | DRG: 641 | End: 2025-07-14
Attending: EMERGENCY MEDICINE | Admitting: EMERGENCY MEDICINE
Payer: COMMERCIAL

## 2025-07-13 VITALS
DIASTOLIC BLOOD PRESSURE: 64 MMHG | SYSTOLIC BLOOD PRESSURE: 103 MMHG | OXYGEN SATURATION: 91 % | TEMPERATURE: 98.7 F | HEART RATE: 100 BPM | RESPIRATION RATE: 18 BRPM

## 2025-07-13 VITALS
OXYGEN SATURATION: 93 % | SYSTOLIC BLOOD PRESSURE: 104 MMHG | HEART RATE: 96 BPM | TEMPERATURE: 96.8 F | DIASTOLIC BLOOD PRESSURE: 64 MMHG | RESPIRATION RATE: 18 BRPM

## 2025-07-13 DIAGNOSIS — M79.642 LEFT HAND PAIN: Primary | ICD-10-CM

## 2025-07-13 DIAGNOSIS — Z76.5 MALINGERING: ICD-10-CM

## 2025-07-13 PROCEDURE — 99283 EMERGENCY DEPT VISIT LOW MDM: CPT

## 2025-07-13 PROCEDURE — 94640 AIRWAY INHALATION TREATMENT: CPT

## 2025-07-13 PROCEDURE — 99284 EMERGENCY DEPT VISIT MOD MDM: CPT | Performed by: EMERGENCY MEDICINE

## 2025-07-13 PROCEDURE — 99284 EMERGENCY DEPT VISIT MOD MDM: CPT

## 2025-07-13 RX ORDER — CEPHALEXIN 500 MG/1
500 CAPSULE ORAL EVERY 6 HOURS SCHEDULED
Qty: 28 CAPSULE | Refills: 0 | Status: SHIPPED | OUTPATIENT
Start: 2025-07-13 | End: 2025-07-20

## 2025-07-13 RX ORDER — IPRATROPIUM BROMIDE AND ALBUTEROL SULFATE 2.5; .5 MG/3ML; MG/3ML
3 SOLUTION RESPIRATORY (INHALATION) ONCE
Status: COMPLETED | OUTPATIENT
Start: 2025-07-13 | End: 2025-07-13

## 2025-07-13 RX ORDER — CEPHALEXIN 500 MG/1
500 CAPSULE ORAL ONCE
Status: COMPLETED | OUTPATIENT
Start: 2025-07-13 | End: 2025-07-13

## 2025-07-13 RX ORDER — GINSENG 100 MG
1 CAPSULE ORAL ONCE
Status: COMPLETED | OUTPATIENT
Start: 2025-07-13 | End: 2025-07-13

## 2025-07-13 RX ADMIN — CEPHALEXIN 500 MG: 500 CAPSULE ORAL at 17:18

## 2025-07-13 RX ADMIN — BACITRACIN ZINC 1 LARGE APPLICATION: 500 OINTMENT TOPICAL at 16:33

## 2025-07-13 RX ADMIN — IPRATROPIUM BROMIDE AND ALBUTEROL SULFATE 3 ML: .5; 3 SOLUTION RESPIRATORY (INHALATION) at 23:17

## 2025-07-13 RX ADMIN — CEPHALEXIN 500 MG: 500 CAPSULE ORAL at 22:51

## 2025-07-13 RX ADMIN — BACITRACIN 1 SMALL APPLICATION: 500 OINTMENT TOPICAL at 00:55

## 2025-07-13 NOTE — ED PROVIDER NOTES
Time reflects when diagnosis was documented in both MDM as applicable and the Disposition within this note       Time User Action Codes Description Comment    7/13/2025  5:38 AM Quinn Krishna Add [F10.929] Alcohol intoxication (HCC)           ED Disposition       ED Disposition   Discharge    Condition   Stable    Date/Time   Sat Jul 12, 2025 10:18 PM    Comment   Gregg Partida discharge to home/self care.                   Assessment & Plan       Medical Decision Making  Patient is a 65 y.o. male who presents to the ED for alcohol intoxication, status post fall.  Patient is nontoxic, well-appearing.     Differential includes but is not limited to: Intracranial bleeding, skull fracture, contusion, metacarpal fracture    Plan: Xrays, CT, IV fluids, observation                     Amount and/or Complexity of Data Reviewed  Radiology: ordered and independent interpretation performed.    Risk  OTC drugs.  Prescription drug management.        ED Course as of 07/14/25 0753   Sun Jul 13, 2025   0200 Pt signed out to Dr Krishna. Plan to observe until sober then d/c       Medications   dextrose 5 % and sodium chloride 0.9 % bolus 1,000 mL (0 mL Intravenous Stopped 7/12/25 2354)   thiamine (VITAMIN B1) 100 mg in dextrose 5 % 100 mL IVPB (0 mg Intravenous Stopped 7/12/25 2119)   folic acid 1 mg in sodium chloride 0.9 % 50 mL IVPB (0 mg Intravenous Stopped 7/13/25 0004)   bacitracin topical ointment 1 small application (1 small application Topical Given 7/13/25 0055)       ED Risk Strat Scores                    No data recorded        SBIRT 20yo+      Flowsheet Row Most Recent Value   Initial Alcohol Screen: US AUDIT-C     1. How often do you have a drink containing alcohol? 6 Filed at: 07/12/2025 2127   2. How many drinks containing alcohol do you have on a typical day you are drinking?  6 Filed at: 07/12/2025 2127   3a. Male UNDER 65: How often do you have five or more drinks on one occasion? 6 Filed at: 07/12/2025 2127    3b. FEMALE Any Age, or MALE 65+: How often do you have 4 or more drinks on one occassion? 6 Filed at: 07/12/2025 2127   Audit-C Score 24 Filed at: 07/12/2025 2127   Full Alcohol Screen: US AUDIT    4. How often during the last year have you found that you were not able to stop drinking once you had started? 0 Filed at: 07/12/2025 2127   5. How often during past year have you failed to do what was normally expected of you because of drinking?  0 Filed at: 07/12/2025 2127   6. How often in past year have you needed a first drink in the morning to get yourself going after a heavy drinking session?  0 Filed at: 07/12/2025 2127   7. How often in past year have you had feeling of guilt or remorse after drinking?  0 Filed at: 07/12/2025 2127   8. How often in past year have you been unable to remember what happened night before because you had been drinking?  0 Filed at: 07/12/2025 2127   9. Have you or someone else been injured as a result of your drinking?  0 Filed at: 07/12/2025 2127   10. Has a relative, friend, doctor or other health worker been concerned about your drinking and suggested you cut down?  0 Filed at: 07/12/2025 2127   AUDIT Total Score 24 Filed at: 07/12/2025 2127   BRIGIDA: How many times in the past year have you...    Used an illegal drug or used a prescription medication for non-medical reasons? Never Filed at: 07/12/2025 2127                            History of Present Illness       Chief Complaint   Patient presents with    Fall     Patient fell and has abrasions and swelling to the left hand, and eye swollen       Past Medical History[1]   Past Surgical History[2]   Family History[3]   Social History[4]   E-Cigarette/Vaping    E-Cigarette Use Never User       E-Cigarette/Vaping Substances    Nicotine Yes     THC No     CBD No     Flavoring No     Other No     Unknown No       I have reviewed and agree with the history as documented.     64 YO M, multiple ED visits, who presents to the ED for  alcohol intoxication, status post fall.  Fell just prior to arrival.  Complaining of left hand pain.  No chest pain, shortness of breath.  Intoxicated.  Tetanus up-to-date.  No other complaints or concerns.      Fall      Review of Systems   Musculoskeletal:         Left hand pain   All other systems reviewed and are negative.          Objective       ED Triage Vitals   Temperature Pulse Blood Pressure Respirations SpO2 Patient Position - Orthostatic VS   07/12/25 2010 07/12/25 2010 07/12/25 2010 07/12/25 2010 07/12/25 2010 07/12/25 2151   97.6 °F (36.4 °C) 88 (!) 82/49 18 90 % Lying      Temp src Heart Rate Source BP Location FiO2 (%) Pain Score    -- 07/12/25 2010 07/12/25 2010 -- --     Monitor Right arm        Vitals      Date and Time Temp Pulse SpO2 Resp BP Pain Score FACES Pain Rating User   07/12/25 232 -- -- -- -- 114/66 -- -- BZ   07/12/25 2151 -- 85 98 % 18 88/53 -- -- DD   07/12/25 2010 97.6 °F (36.4 °C) 88 90 % 18 82/49 -- -- BZ            Physical Exam  Vitals and nursing note reviewed.   Constitutional:       General: He is not in acute distress.     Appearance: He is well-developed. He is not diaphoretic.   HENT:      Head: Normocephalic.        Right Ear: External ear normal.      Left Ear: External ear normal.      Nose: Nose normal.     Eyes:      General: Lids are normal. No scleral icterus.      Cardiovascular:      Rate and Rhythm: Normal rate and regular rhythm.      Heart sounds: Normal heart sounds. No murmur heard.     No friction rub. No gallop.   Pulmonary:      Effort: Pulmonary effort is normal. No respiratory distress.      Breath sounds: Normal breath sounds. No wheezing or rales.   Abdominal:      Palpations: Abdomen is soft.      Tenderness: There is no abdominal tenderness. There is no guarding or rebound.     Musculoskeletal:         General: No deformity. Normal range of motion.        Hands:       Cervical back: Normal range of motion and neck supple.     Skin:     General:  Skin is warm and dry.     Neurological:      General: No focal deficit present.      Mental Status: He is alert.     Psychiatric:         Mood and Affect: Mood normal.         Behavior: Behavior normal.         Results Reviewed       None            CT spine cervical without contrast   Final Interpretation by Yevgeniy Maria MD (07/12 2227)      Extensive postsurgical changes of the cervical spine again noted without acute cervical spine fracture or traumatic malalignment. Stable chronic right posterior paraspinal soft tissue neck subcutaneous fluid collection. Stable small left pleural effusion    partially seen. Additional ancillary findings detailed above.                  Workstation performed: [id number]         CT head without contrast   Final Interpretation by Yevgeniy Maria MD (07/12 2214)      Stable findings without acute intracranial hemorrhage or depressed calvarial fracture. There is a left frontal temporal scalp, periorbital, and premaxillary facial soft tissue swelling.                  Workstation performed: [id number]         XR hand 3+ views LEFT   ED Interpretation by Gilberto North DO (07/12 2043)   No acute osseous abnormality          Procedures    ED Medication and Procedure Management   Prior to Admission Medications   Prescriptions Last Dose Informant Patient Reported? Taking?   Blood Glucose Monitoring Suppl (ONE TOUCH ULTRA MINI) w/Device KIT  Self Yes No   Sig: Use as directed   Patient not taking: Reported on 6/5/2025   Blood Pressure Monitoring (Adult Blood Pressure Cuff Lg) KIT   No No   Sig: Use in the morning   Patient not taking: Reported on 6/5/2025   ONETOUCH DELICA LANCETS FINE MISC  Self Yes No   Sig: in the morning and in the evening and before bedtime. Test.   Patient not taking: Reported on 6/5/2025   Thiamine Mononitrate (VITAMIN B1) 100 mg tablet   No No   Sig: Take 1 tablet (100 mg total) by mouth in the morning.   albuterol (2.5 mg/3 mL) 0.083 % nebulizer solution   No No    Sig: Take 3 mL (2.5 mg total) by nebulization 3 (three) times a day   albuterol (PROVENTIL HFA,VENTOLIN HFA) 90 mcg/act inhaler   No No   Sig: Inhale 2 puffs every 6 (six) hours as needed for wheezing or shortness of breath   apixaban (Eliquis) 5 mg   No No   Sig: Take 1 tablet (5 mg total) by mouth in the morning and 1 tablet (5 mg total) in the evening.   aspirin 81 mg chewable tablet   No No   Sig: Chew 1 tablet (81 mg total) in the morning.   atorvastatin (LIPITOR) 40 mg tablet   No No   Sig: Take 1 tablet (40 mg total) by mouth in the morning.   diltiazem (CARDIZEM SR) 120 mg 12 hr capsule   No No   Sig: Take 1 capsule (120 mg total) by mouth in the morning and 1 capsule (120 mg total) in the evening.   ferrous sulfate 325 (65 Fe) mg tablet   No No   Sig: Take 1 tablet (325 mg total) by mouth daily with breakfast   fluticasone-vilanterol 200-25 mcg/actuation inhaler   No No   Sig: Inhale 1 puff in the morning. Rinse mouth after use.   folic acid (FOLVITE) 400 mcg tablet   No No   Sig: Take 1 tablet (400 mcg total) by mouth in the morning.   gabapentin (NEURONTIN) 300 mg capsule   No No   Sig: Take 1 capsule (300 mg total) by mouth in the morning and 1 capsule (300 mg total) in the evening and 1 capsule (300 mg total) before bedtime.   metoprolol succinate (TOPROL-XL) 100 mg 24 hr tablet   No No   Sig: Take 1 tablet (100 mg total) by mouth daily   pantoprazole (PROTONIX) 40 mg tablet   No No   Sig: Take 1 tablet (40 mg total) by mouth in the morning.   ranolazine (RANEXA) 500 mg 12 hr tablet   No No   Sig: Take 1 tablet (500 mg total) by mouth in the morning and 1 tablet (500 mg total) in the evening.   saccharomyces boulardii (FLORASTOR) 250 mg capsule   No No   Sig: Take 1 capsule (250 mg total) by mouth in the morning and 1 capsule (250 mg total) in the evening.   tamsulosin (FLOMAX) 0.4 mg   No No   Sig: Take 1 capsule (0.4 mg total) by mouth daily with dinner   torsemide (DEMADEX) 10 mg tablet   No No    Sig: Take 1 tablet (10 mg total) by mouth every other day      Facility-Administered Medications: None     Discharge Medication List as of 7/13/2025  5:39 AM        CONTINUE these medications which have NOT CHANGED    Details   albuterol (2.5 mg/3 mL) 0.083 % nebulizer solution Take 3 mL (2.5 mg total) by nebulization 3 (three) times a day, Starting Th 6/12/2025, Normal      albuterol (PROVENTIL HFA,VENTOLIN HFA) 90 mcg/act inhaler Inhale 2 puffs every 6 (six) hours as needed for wheezing or shortness of breath, Starting Thu 6/12/2025, Normal      apixaban (Eliquis) 5 mg Take 1 tablet (5 mg total) by mouth in the morning and 1 tablet (5 mg total) in the evening., Starting Thu 6/12/2025, Normal      aspirin 81 mg chewable tablet Chew 1 tablet (81 mg total) in the morning., Starting Th 6/12/2025, Normal      atorvastatin (LIPITOR) 40 mg tablet Take 1 tablet (40 mg total) by mouth in the morning., Starting Thu 6/12/2025, Normal      Blood Glucose Monitoring Suppl (ONE TOUCH ULTRA MINI) w/Device KIT Use as directed, Starting Th 5/16/2019, Historical Med      Blood Pressure Monitoring (Adult Blood Pressure Cuff Lg) KIT Use in the morning, Starting Th 12/14/2023, Normal      diltiazem (CARDIZEM SR) 120 mg 12 hr capsule Take 1 capsule (120 mg total) by mouth in the morning and 1 capsule (120 mg total) in the evening., Starting Thu 6/12/2025, Normal      ferrous sulfate 325 (65 Fe) mg tablet Take 1 tablet (325 mg total) by mouth daily with breakfast, Starting Thu 6/12/2025, Normal      fluticasone-vilanterol 200-25 mcg/actuation inhaler Inhale 1 puff in the morning. Rinse mouth after use., Starting Thu 6/12/2025, Normal      folic acid (FOLVITE) 400 mcg tablet Take 1 tablet (400 mcg total) by mouth in the morning., Starting Th 6/12/2025, Normal      gabapentin (NEURONTIN) 300 mg capsule Take 1 capsule (300 mg total) by mouth in the morning and 1 capsule (300 mg total) in the evening and 1 capsule (300 mg total)  before bedtime., Starting Thu 6/12/2025, Normal      metoprolol succinate (TOPROL-XL) 100 mg 24 hr tablet Take 1 tablet (100 mg total) by mouth daily, Starting Thu 6/12/2025, Normal      ONETOUCH DELICA LANCETS FINE MISC in the morning and in the evening and before bedtime. Test., Starting Thu 5/16/2019, Historical Med      pantoprazole (PROTONIX) 40 mg tablet Take 1 tablet (40 mg total) by mouth in the morning., Starting Thu 6/12/2025, Normal      ranolazine (RANEXA) 500 mg 12 hr tablet Take 1 tablet (500 mg total) by mouth in the morning and 1 tablet (500 mg total) in the evening., Starting Thu 6/12/2025, Normal      saccharomyces boulardii (FLORASTOR) 250 mg capsule Take 1 capsule (250 mg total) by mouth in the morning and 1 capsule (250 mg total) in the evening., Starting Thu 6/12/2025, Normal      tamsulosin (FLOMAX) 0.4 mg Take 1 capsule (0.4 mg total) by mouth daily with dinner, Starting Thu 6/12/2025, Normal      Thiamine Mononitrate (VITAMIN B1) 100 mg tablet Take 1 tablet (100 mg total) by mouth in the morning., Starting Thu 6/12/2025, No Print      torsemide (DEMADEX) 10 mg tablet Take 1 tablet (10 mg total) by mouth every other day, Starting Thu 6/12/2025, Until Sat 7/12/2025, Normal           No discharge procedures on file.  ED SEPSIS DOCUMENTATION   Time reflects when diagnosis was documented in both MDM as applicable and the Disposition within this note       Time User Action Codes Description Comment    7/13/2025  5:38 AM Quinn Krishna Add [F10.929] Alcohol intoxication (HCC)                      [1]   Past Medical History:  Diagnosis Date    Acute encephalopathy 06/07/2025    Acute on chronic kidney failure  (HCC)     Alcohol abuse     Alcohol withdrawal (HCC) 10/09/2018    Atrial fibrillation (HCC)     Cancer (HCC)     prostate ca,had radiation    Cardiac disease     stents,then triple bypass    COPD (chronic obstructive pulmonary disease) (HCC)     Coronary artery disease     Elevated troponin  "09/28/2023    ETOH abuse     Heart failure (HCC)     History of heart surgery     says [facility] bypass [facility]    Hx of heart artery stent     2014    Hyperlipidemia     Hypertension     Hypoglycemia 06/05/2025    Hyponatremia 10/17/2019    Hypovolemic shock ([facility]) 12/22/2019    Increased anion gap 04/21/2021    Lumbar spondylitis ([facility]) 10/13/2022    Metabolic acidosis, increased anion gap 04/21/2021    Nasal bone fracture 10/10/2022    Prostate CA ([facility])     S/P CABG x 3     2004    Sleep apnea    [2]   Past Surgical History:  Procedure Laterality Date    CARDIAC CATHETERIZATION      2 stents    CORONARY ARTERY BYPASS GRAFT      CORONARY ARTERY BYPASS GRAFT  2004    [facility] ARTHRD ANT INTERBODY MIN DSC CRV BELOW C2 N/A 12/16/2020    Procedure: Anterior cervical discectomy with fusion C4-C7; Posterior cervical decompression and fusion C2-T2;  Surgeon: David Rowell MD;  Location: [facility];  Service: Neurosurgery    TONSILLECTOMY     [3]   Family History  Problem Relation Name Age of Onset    Diabetes Mother      Uterine cancer Mother      COPD Father      Hypertension Father     [4]   Social History  Tobacco Use    Smoking status: Every Day     Current packs/day: 1.50     Average packs/day: 1.5 packs/day for 40.0 years (60.0 ttl pk-yrs)     Types: Cigarettes    Smokeless tobacco: Never   Vaping Use    Vaping status: Never Used   Substance Use Topics    Alcohol use: Yes     Alcohol/week: 4.0 standard drinks of alcohol     Types: 4 Standard drinks or equivalent per week     Comment: \"quart a day\"    Drug use: No        Gilberto North DO  07/14/25 0753    "

## 2025-07-13 NOTE — ED PROVIDER NOTES
Time reflects when diagnosis was documented in both MDM as applicable and the Disposition within this note       Time User Action Codes Description Comment    7/13/2025  4:20 PM Valentin Monk Add [M79.642] Left hand pain           ED Disposition       None          Assessment & Plan       Medical Decision Making  65-year-old male with complaints of left hand pain     Risk  OTC drugs.  Prescription drug management.             Medications   bacitracin topical ointment 1 large application (1 large application Topical Given 7/13/25 5133)   cephalexin (KEFLEX) capsule 500 mg (500 mg Oral Given 7/13/25 1718)       ED Risk Strat Scores                    No data recorded                            History of Present Illness       Chief Complaint   Patient presents with    Hand Pain     Patient comes in with EMS, EMS stated call was made by patients friend due to patient complaining of abdominal pain. Patient states he no longer has abdominal pain. He is having a lot of pain in his left hand where previous wounds are. Patient was D/C from our ED this morning. Patient reports drinking half a pint since leaving here.        Past Medical History[1]   Past Surgical History[2]   Family History[3]   Social History[4]   E-Cigarette/Vaping    E-Cigarette Use Never User       E-Cigarette/Vaping Substances    Nicotine Yes     THC No     CBD No     Flavoring No     Other No     Unknown No       I have reviewed and agree with the history as documented.     65-year-old male frequent episodes to the ED left at 930 this morning has come back to the ED states that his dressing on his left hand is falling off and that his wounds are still bleeding and is uncomfortable.  Patient requesting a sandwich no fevers chills nausea vomiting diarrhea no other new injuries states he did not touch the dressing itself it just kind of came off.        Review of Systems   Constitutional:  Negative for activity change, chills, diaphoresis and fever.    HENT:  Negative for congestion, ear pain, nosebleeds, sore throat, trouble swallowing and voice change.    Eyes:  Negative for pain, discharge and redness.   Respiratory:  Negative for apnea, cough, choking, shortness of breath, wheezing and stridor.    Cardiovascular:  Negative for chest pain and palpitations.   Gastrointestinal:  Negative for abdominal distention, abdominal pain, constipation, diarrhea, nausea and vomiting.   Endocrine: Negative for polydipsia.   Genitourinary:  Negative for difficulty urinating, dysuria, flank pain, frequency, hematuria and urgency.   Musculoskeletal:  Negative for back pain, gait problem, joint swelling, myalgias, neck pain and neck stiffness.   Skin:  Positive for wound. Negative for pallor and rash.   Neurological:  Negative for dizziness, tremors, syncope, speech difficulty, weakness, numbness and headaches.   Hematological:  Negative for adenopathy.   Psychiatric/Behavioral:  Negative for confusion, hallucinations, self-injury and suicidal ideas. The patient is not nervous/anxious.            Objective       ED Triage Vitals [07/13/25 1540]   Temperature Pulse Blood Pressure Respirations SpO2 Patient Position - Orthostatic VS   (!) 96.8 °F (36 °C) 96 104/64 18 93 % Lying      Temp Source Heart Rate Source BP Location FiO2 (%) Pain Score    Temporal Monitor Right arm -- --      Vitals      Date and Time Temp Pulse SpO2 Resp BP Pain Score FACES Pain Rating User   07/13/25 1540 96.8 °F (36 °C) 96 93 % 18 104/64 -- -- NM            Physical Exam  Vitals and nursing note reviewed.   Constitutional:       General: He is not in acute distress.     Appearance: He is well-developed. He is not diaphoretic.   HENT:      Head: Normocephalic and atraumatic.      Right Ear: External ear normal.      Left Ear: External ear normal.      Nose: Nose normal.     Eyes:      Conjunctiva/sclera: Conjunctivae normal.      Pupils: Pupils are equal, round, and reactive to light.        Cardiovascular:      Rate and Rhythm: Normal rate and regular rhythm.      Heart sounds: Normal heart sounds.   Pulmonary:      Effort: Pulmonary effort is normal.      Breath sounds: Normal breath sounds.   Abdominal:      General: Bowel sounds are normal.      Palpations: Abdomen is soft.      Tenderness: There is no abdominal tenderness.     Musculoskeletal:         General: Tenderness and signs of injury present. Normal range of motion.      Cervical back: Normal range of motion and neck supple.      Comments: Tenderness on his left hand is not new     Skin:     General: Skin is warm and dry.     Neurological:      Mental Status: He is alert and oriented to person, place, and time.      Deep Tendon Reflexes: Reflexes are normal and symmetric.         Results Reviewed       None            No orders to display       Procedures    ED Medication and Procedure Management   Prior to Admission Medications   Prescriptions Last Dose Informant Patient Reported? Taking?   Blood Glucose Monitoring Suppl (ONE TOUCH ULTRA MINI) w/Device KIT  Self Yes No   Sig: Use as directed   Patient not taking: Reported on 6/5/2025   Blood Pressure Monitoring (Adult Blood Pressure Cuff Lg) KIT   No No   Sig: Use in the morning   Patient not taking: Reported on 6/5/2025   ONETOUCH DELICA LANCETS FINE MISC  Self Yes No   Sig: in the morning and in the evening and before bedtime. Test.   Patient not taking: Reported on 6/5/2025   Thiamine Mononitrate (VITAMIN B1) 100 mg tablet   No No   Sig: Take 1 tablet (100 mg total) by mouth in the morning.   albuterol (2.5 mg/3 mL) 0.083 % nebulizer solution   No No   Sig: Take 3 mL (2.5 mg total) by nebulization 3 (three) times a day   albuterol (PROVENTIL HFA,VENTOLIN HFA) 90 mcg/act inhaler   No No   Sig: Inhale 2 puffs every 6 (six) hours as needed for wheezing or shortness of breath   apixaban (Eliquis) 5 mg   No No   Sig: Take 1 tablet (5 mg total) by mouth in the morning and 1 tablet (5 mg  total) in the evening.   aspirin 81 mg chewable tablet   No No   Sig: Chew 1 tablet (81 mg total) in the morning.   atorvastatin (LIPITOR) 40 mg tablet   No No   Sig: Take 1 tablet (40 mg total) by mouth in the morning.   diltiazem (CARDIZEM SR) 120 mg 12 hr capsule   No No   Sig: Take 1 capsule (120 mg total) by mouth in the morning and 1 capsule (120 mg total) in the evening.   ferrous sulfate 325 (65 Fe) mg tablet   No No   Sig: Take 1 tablet (325 mg total) by mouth daily with breakfast   fluticasone-vilanterol 200-25 mcg/actuation inhaler   No No   Sig: Inhale 1 puff in the morning. Rinse mouth after use.   folic acid (FOLVITE) 400 mcg tablet   No No   Sig: Take 1 tablet (400 mcg total) by mouth in the morning.   gabapentin (NEURONTIN) 300 mg capsule   No No   Sig: Take 1 capsule (300 mg total) by mouth in the morning and 1 capsule (300 mg total) in the evening and 1 capsule (300 mg total) before bedtime.   metoprolol succinate (TOPROL-XL) 100 mg 24 hr tablet   No No   Sig: Take 1 tablet (100 mg total) by mouth daily   pantoprazole (PROTONIX) 40 mg tablet   No No   Sig: Take 1 tablet (40 mg total) by mouth in the morning.   ranolazine (RANEXA) 500 mg 12 hr tablet   No No   Sig: Take 1 tablet (500 mg total) by mouth in the morning and 1 tablet (500 mg total) in the evening.   saccharomyces boulardii (FLORASTOR) 250 mg capsule   No No   Sig: Take 1 capsule (250 mg total) by mouth in the morning and 1 capsule (250 mg total) in the evening.   tamsulosin (FLOMAX) 0.4 mg   No No   Sig: Take 1 capsule (0.4 mg total) by mouth daily with dinner   torsemide (DEMADEX) 10 mg tablet   No No   Sig: Take 1 tablet (10 mg total) by mouth every other day      Facility-Administered Medications: None     Patient's Medications   Discharge Prescriptions    CEPHALEXIN (KEFLEX) 500 MG CAPSULE    Take 1 capsule (500 mg total) by mouth every 6 (six) hours for 7 days       Start Date: 7/13/2025 End Date: 7/20/2025       Order Dose: 500 mg        Quantity: 28 capsule    Refills: 0     No discharge procedures on file.  ED SEPSIS DOCUMENTATION   Time reflects when diagnosis was documented in both MDM as applicable and the Disposition within this note       Time User Action Codes Description Comment    7/13/2025  4:20 PM Valentin Monk [M79.642] Left hand pain                    Valentin Monk, DO  07/13/25 1621       Valentin Monk, DO  07/13/25 1621         [1]   Past Medical History:  Diagnosis Date    Acute encephalopathy 06/07/2025    Acute on chronic kidney failure  (HCC)     Alcohol abuse     Alcohol withdrawal (HCC) 10/09/2018    Atrial fibrillation (HCC)     Cancer (HCC)     prostate ca,had radiation    Cardiac disease     stents,then triple bypass    COPD (chronic obstructive pulmonary disease) ([facility])     Coronary artery disease     Elevated troponin 09/28/2023    ETOH abuse     Heart failure ([facility])     History of heart surgery     says [facility]    Hx of heart artery stent     2014    Hyperlipidemia     Hypertension     Hypoglycemia 06/05/2025    Hyponatremia 10/17/2019    Hypovolemic shock ([facility]) 12/22/2019    Increased anion gap 04/21/2021    Lumbar spondylitis (HCC) 10/13/2022    Metabolic acidosis, increased anion gap 04/21/2021    Nasal bone fracture 10/10/2022    Prostate CA (HCC)     S/P CABG x 3     2004    Sleep apnea    [2]   Past Surgical History:  Procedure Laterality Date    CARDIAC CATHETERIZATION      2 stents    CORONARY ARTERY BYPASS GRAFT      CORONARY ARTERY BYPASS GRAFT  2004    RI ARTHRD ANT INTERBODY MIN DSC CRV BELOW C2 N/A 12/16/2020    Procedure: Anterior cervical discectomy with fusion C4-C7; Posterior cervical decompression and fusion C2-T2;  Surgeon: David Rowell MD;  Location: [facility];  Service: Neurosurgery    TONSILLECTOMY     [3]   Family History  Problem Relation Name Age of Onset    Diabetes Mother      Uterine cancer Mother      COPD Father      Hypertension Father     [4]   Social  "History  Tobacco Use    Smoking status: Every Day     Current packs/day: 1.50     Average packs/day: 1.5 packs/day for 40.0 years (60.0 ttl pk-yrs)     Types: Cigarettes    Smokeless tobacco: Never   Vaping Use    Vaping status: Never Used   Substance Use Topics    Alcohol use: Yes     Alcohol/week: 4.0 standard drinks of alcohol     Types: 4 Standard drinks or equivalent per week     Comment: \"quart a day\"    Drug use: No        Valentin Monk DO  07/13/25 1911    "

## 2025-07-14 ENCOUNTER — APPOINTMENT (EMERGENCY)
Dept: RADIOLOGY | Facility: HOSPITAL | Age: 65
DRG: 641 | End: 2025-07-14
Payer: COMMERCIAL

## 2025-07-14 ENCOUNTER — HOSPITAL ENCOUNTER (EMERGENCY)
Facility: HOSPITAL | Age: 65
Discharge: HOME | DRG: 641 | End: 2025-07-15
Attending: EMERGENCY MEDICINE | Admitting: EMERGENCY MEDICINE
Payer: COMMERCIAL

## 2025-07-14 VITALS
SYSTOLIC BLOOD PRESSURE: 120 MMHG | OXYGEN SATURATION: 93 % | RESPIRATION RATE: 18 BRPM | DIASTOLIC BLOOD PRESSURE: 67 MMHG | HEART RATE: 92 BPM | TEMPERATURE: 97.5 F

## 2025-07-14 DIAGNOSIS — R05.1 ACUTE COUGH: ICD-10-CM

## 2025-07-14 DIAGNOSIS — F10.929 ALCOHOL INTOXICATION (HCC): Primary | ICD-10-CM

## 2025-07-14 PROCEDURE — 71045 X-RAY EXAM CHEST 1 VIEW: CPT

## 2025-07-14 PROCEDURE — 99283 EMERGENCY DEPT VISIT LOW MDM: CPT

## 2025-07-14 PROCEDURE — 99284 EMERGENCY DEPT VISIT MOD MDM: CPT | Performed by: EMERGENCY MEDICINE

## 2025-07-14 RX ORDER — GUAIFENESIN/DEXTROMETHORPHAN 100-10MG/5
5 SYRUP ORAL 3 TIMES DAILY PRN
Qty: 118 ML | Refills: 0 | Status: SHIPPED | OUTPATIENT
Start: 2025-07-14

## 2025-07-14 NOTE — ED NOTES
"Pt ambulated by ovi SORIANO. Pt able to ambulate with walker appropriately. Pt requesting to go home in a cab.      [facility] not willing to take pt due to multiple times being locked out of his lobby. Pt does not have [facility] card on him at this time.  Pt stating \" I have 3 neighbors that can let me in\" [facility] requesting numbers of neighbors so there a plan to get pt into residence.     Marietta Bedoya RN  07/14/25 0013    "

## 2025-07-14 NOTE — ED NOTES
"Pt continuously yelling about a ride home.  Pt attempted to be given verbal redirection and its unsuccessfully.  Pt got up and ambulated with steady gait into the nurses station stating he wanted to \"find the nurse in charge of his ride\".  Pt redirected and educated about how we are not in control of the lytes/ambulance rides.  Pt still aggravated but back in his bed     Naomie Manning RN  07/14/25 0102    "

## 2025-07-15 ENCOUNTER — HOSPITAL ENCOUNTER (INPATIENT)
Facility: HOSPITAL | Age: 65
LOS: 1 days | Discharge: HOME | DRG: 641 | End: 2025-07-16
Attending: EMERGENCY MEDICINE | Admitting: INTERNAL MEDICINE
Payer: COMMERCIAL

## 2025-07-15 ENCOUNTER — APPOINTMENT (EMERGENCY)
Dept: RADIOLOGY | Facility: HOSPITAL | Age: 65
DRG: 641 | End: 2025-07-15
Payer: COMMERCIAL

## 2025-07-15 VITALS
HEART RATE: 98 BPM | SYSTOLIC BLOOD PRESSURE: 109 MMHG | DIASTOLIC BLOOD PRESSURE: 71 MMHG | OXYGEN SATURATION: 91 % | RESPIRATION RATE: 20 BRPM | TEMPERATURE: 97.7 F

## 2025-07-15 DIAGNOSIS — F10.929 ALCOHOL INTOXICATION (HCC): Primary | ICD-10-CM

## 2025-07-15 DIAGNOSIS — Z01.89 ENCOUNTER FOR COMPETENCY EVALUATION: ICD-10-CM

## 2025-07-15 DIAGNOSIS — T07.XXXA MULTIPLE ABRASIONS: ICD-10-CM

## 2025-07-15 DIAGNOSIS — E83.42 HYPOMAGNESEMIA: ICD-10-CM

## 2025-07-15 DIAGNOSIS — F10.920 ALCOHOLIC INTOXICATION WITHOUT COMPLICATION (HCC): Primary | ICD-10-CM

## 2025-07-15 DIAGNOSIS — Z87.828 HISTORY OF OPEN HAND WOUND: ICD-10-CM

## 2025-07-15 PROBLEM — Z59.819 HOUSING INSTABILITY: Status: ACTIVE | Noted: 2025-07-15

## 2025-07-15 LAB
ALBUMIN SERPL BCG-MCNC: 3.3 G/DL (ref 3.5–5)
ALP SERPL-CCNC: 130 U/L (ref 34–104)
ALT SERPL W P-5'-P-CCNC: 26 U/L (ref 7–52)
ANION GAP SERPL CALCULATED.3IONS-SCNC: 12 MMOL/L (ref 4–13)
AST SERPL W P-5'-P-CCNC: 74 U/L (ref 13–39)
BASOPHILS # BLD AUTO: 0.13 THOUSANDS/ÂΜL (ref 0–0.1)
BASOPHILS NFR BLD AUTO: 4 % (ref 0–1)
BILIRUB SERPL-MCNC: 0.48 MG/DL (ref 0.2–1)
BUN SERPL-MCNC: 15 MG/DL (ref 5–25)
CALCIUM ALBUM COR SERPL-MCNC: 8.1 MG/DL (ref 8.3–10.1)
CALCIUM SERPL-MCNC: 7.5 MG/DL (ref 8.4–10.2)
CHLORIDE SERPL-SCNC: 105 MMOL/L (ref 96–108)
CO2 SERPL-SCNC: 25 MMOL/L (ref 21–32)
CREAT SERPL-MCNC: 1.07 MG/DL (ref 0.6–1.3)
EOSINOPHIL # BLD AUTO: 0.06 THOUSAND/ÂΜL (ref 0–0.61)
EOSINOPHIL NFR BLD AUTO: 2 % (ref 0–6)
ERYTHROCYTE [DISTWIDTH] IN BLOOD BY AUTOMATED COUNT: 18.8 % (ref 11.6–15.1)
ETHANOL EXG-MCNC: 0.12 MG/DL
ETHANOL SERPL-MCNC: 391 MG/DL
GFR SERPL CREATININE-BSD FRML MDRD: 73 ML/MIN/1.73SQ M
GLUCOSE SERPL-MCNC: 83 MG/DL (ref 65–140)
HCT VFR BLD AUTO: 31.7 % (ref 36.5–49.3)
HGB BLD-MCNC: 10 G/DL (ref 12–17)
IMM GRANULOCYTES # BLD AUTO: 0.01 THOUSAND/UL (ref 0–0.2)
IMM GRANULOCYTES NFR BLD AUTO: 0 % (ref 0–2)
LIPASE SERPL-CCNC: 46 U/L (ref 11–82)
LYMPHOCYTES # BLD AUTO: 1.13 THOUSANDS/ÂΜL (ref 0.6–4.47)
LYMPHOCYTES NFR BLD AUTO: 30 % (ref 14–44)
MAGNESIUM SERPL-MCNC: 1.1 MG/DL (ref 1.9–2.7)
MCH RBC QN AUTO: 30.4 PG (ref 26.8–34.3)
MCHC RBC AUTO-ENTMCNC: 31.5 G/DL (ref 31.4–37.4)
MCV RBC AUTO: 96 FL (ref 82–98)
MONOCYTES # BLD AUTO: 0.44 THOUSAND/ÂΜL (ref 0.17–1.22)
MONOCYTES NFR BLD AUTO: 12 % (ref 4–12)
NEUTROPHILS # BLD AUTO: 1.96 THOUSANDS/ÂΜL (ref 1.85–7.62)
NEUTS SEG NFR BLD AUTO: 52 % (ref 43–75)
NRBC BLD AUTO-RTO: 0 /100 WBCS
PLATELET # BLD AUTO: 126 THOUSANDS/UL (ref 149–390)
PMV BLD AUTO: 10.7 FL (ref 8.9–12.7)
POTASSIUM SERPL-SCNC: 3.9 MMOL/L (ref 3.5–5.3)
PROT SERPL-MCNC: 6.8 G/DL (ref 6.4–8.4)
RBC # BLD AUTO: 3.29 MILLION/UL (ref 3.88–5.62)
SODIUM SERPL-SCNC: 142 MMOL/L (ref 135–147)
WBC # BLD AUTO: 3.73 THOUSAND/UL (ref 4.31–10.16)

## 2025-07-15 PROCEDURE — 83690 ASSAY OF LIPASE: CPT | Performed by: EMERGENCY MEDICINE

## 2025-07-15 PROCEDURE — 82075 ASSAY OF BREATH ETHANOL: CPT | Performed by: EMERGENCY MEDICINE

## 2025-07-15 PROCEDURE — 70450 CT HEAD/BRAIN W/O DYE: CPT

## 2025-07-15 PROCEDURE — 96365 THER/PROPH/DIAG IV INF INIT: CPT

## 2025-07-15 PROCEDURE — 82077 ASSAY SPEC XCP UR&BREATH IA: CPT | Performed by: EMERGENCY MEDICINE

## 2025-07-15 PROCEDURE — 99285 EMERGENCY DEPT VISIT HI MDM: CPT | Performed by: EMERGENCY MEDICINE

## 2025-07-15 PROCEDURE — 93005 ELECTROCARDIOGRAM TRACING: CPT

## 2025-07-15 PROCEDURE — 85025 COMPLETE CBC W/AUTO DIFF WBC: CPT | Performed by: EMERGENCY MEDICINE

## 2025-07-15 PROCEDURE — 36415 COLL VENOUS BLD VENIPUNCTURE: CPT | Performed by: EMERGENCY MEDICINE

## 2025-07-15 PROCEDURE — 80053 COMPREHEN METABOLIC PANEL: CPT | Performed by: EMERGENCY MEDICINE

## 2025-07-15 PROCEDURE — 99283 EMERGENCY DEPT VISIT LOW MDM: CPT

## 2025-07-15 PROCEDURE — 99285 EMERGENCY DEPT VISIT HI MDM: CPT

## 2025-07-15 PROCEDURE — 99223 1ST HOSP IP/OBS HIGH 75: CPT

## 2025-07-15 PROCEDURE — 83735 ASSAY OF MAGNESIUM: CPT | Performed by: EMERGENCY MEDICINE

## 2025-07-15 PROCEDURE — 96372 THER/PROPH/DIAG INJ SC/IM: CPT

## 2025-07-15 RX ORDER — DILTIAZEM HYDROCHLORIDE 60 MG/1
120 CAPSULE, EXTENDED RELEASE ORAL 2 TIMES DAILY
Status: DISCONTINUED | OUTPATIENT
Start: 2025-07-15 | End: 2025-07-16 | Stop reason: HOSPADM

## 2025-07-15 RX ORDER — GABAPENTIN 300 MG/1
300 CAPSULE ORAL 3 TIMES DAILY
Status: DISCONTINUED | OUTPATIENT
Start: 2025-07-15 | End: 2025-07-16 | Stop reason: HOSPADM

## 2025-07-15 RX ORDER — METOPROLOL SUCCINATE 100 MG/1
100 TABLET, EXTENDED RELEASE ORAL DAILY
Status: DISCONTINUED | OUTPATIENT
Start: 2025-07-16 | End: 2025-07-16 | Stop reason: HOSPADM

## 2025-07-15 RX ORDER — ACETAMINOPHEN 325 MG/1
650 TABLET ORAL EVERY 6 HOURS PRN
Status: DISCONTINUED | OUTPATIENT
Start: 2025-07-15 | End: 2025-07-16 | Stop reason: HOSPADM

## 2025-07-15 RX ORDER — METOPROLOL SUCCINATE 50 MG/1
100 TABLET, EXTENDED RELEASE ORAL DAILY
Status: DISCONTINUED | OUTPATIENT
Start: 2025-07-16 | End: 2025-07-15

## 2025-07-15 RX ORDER — FOLIC ACID 0.4 MG
400 TABLET ORAL DAILY
Status: DISCONTINUED | OUTPATIENT
Start: 2025-07-16 | End: 2025-07-16 | Stop reason: HOSPADM

## 2025-07-15 RX ORDER — VANCOMYCIN HYDROCHLORIDE 125 MG/1
125 CAPSULE ORAL EVERY 12 HOURS SCHEDULED
Status: DISCONTINUED | OUTPATIENT
Start: 2025-07-15 | End: 2025-07-16 | Stop reason: HOSPADM

## 2025-07-15 RX ORDER — MIRTAZAPINE 15 MG/1
15 TABLET, FILM COATED ORAL
Status: DISCONTINUED | OUTPATIENT
Start: 2025-07-15 | End: 2025-07-15 | Stop reason: HOSPADM

## 2025-07-15 RX ORDER — METOPROLOL TARTRATE 1 MG/ML
5 INJECTION, SOLUTION INTRAVENOUS EVERY 6 HOURS PRN
Status: DISCONTINUED | OUTPATIENT
Start: 2025-07-15 | End: 2025-07-16 | Stop reason: HOSPADM

## 2025-07-15 RX ORDER — SACCHAROMYCES BOULARDII 250 MG
250 CAPSULE ORAL 2 TIMES DAILY
Status: DISCONTINUED | OUTPATIENT
Start: 2025-07-15 | End: 2025-07-16 | Stop reason: HOSPADM

## 2025-07-15 RX ORDER — ASPIRIN 81 MG/1
81 TABLET, CHEWABLE ORAL DAILY
Status: DISCONTINUED | OUTPATIENT
Start: 2025-07-16 | End: 2025-07-16 | Stop reason: HOSPADM

## 2025-07-15 RX ORDER — LORAZEPAM 2 MG/ML
1 INJECTION INTRAMUSCULAR ONCE
Status: COMPLETED | OUTPATIENT
Start: 2025-07-15 | End: 2025-07-15

## 2025-07-15 RX ORDER — TORSEMIDE 10 MG/1
10 TABLET ORAL EVERY OTHER DAY
Status: DISCONTINUED | OUTPATIENT
Start: 2025-07-15 | End: 2025-07-16 | Stop reason: HOSPADM

## 2025-07-15 RX ORDER — NICOTINE 21 MG/24HR
1 PATCH, TRANSDERMAL 24 HOURS TRANSDERMAL DAILY
Status: DISCONTINUED | OUTPATIENT
Start: 2025-07-16 | End: 2025-07-16 | Stop reason: HOSPADM

## 2025-07-15 RX ORDER — RANOLAZINE 500 MG/1
500 TABLET, EXTENDED RELEASE ORAL 2 TIMES DAILY
Status: DISCONTINUED | OUTPATIENT
Start: 2025-07-15 | End: 2025-07-16 | Stop reason: HOSPADM

## 2025-07-15 RX ORDER — FLUTICASONE FUROATE AND VILANTEROL 200; 25 UG/1; UG/1
1 POWDER RESPIRATORY (INHALATION) DAILY
Status: DISCONTINUED | OUTPATIENT
Start: 2025-07-16 | End: 2025-07-16 | Stop reason: HOSPADM

## 2025-07-15 RX ORDER — KETOROLAC TROMETHAMINE 30 MG/ML
15 INJECTION, SOLUTION INTRAMUSCULAR; INTRAVENOUS ONCE
Status: COMPLETED | OUTPATIENT
Start: 2025-07-15 | End: 2025-07-15

## 2025-07-15 RX ORDER — PANTOPRAZOLE SODIUM 40 MG/1
40 TABLET, DELAYED RELEASE ORAL DAILY
Status: DISCONTINUED | OUTPATIENT
Start: 2025-07-16 | End: 2025-07-16 | Stop reason: HOSPADM

## 2025-07-15 RX ORDER — FERROUS SULFATE 325(65) MG
325 TABLET ORAL
Status: DISCONTINUED | OUTPATIENT
Start: 2025-07-16 | End: 2025-07-16 | Stop reason: HOSPADM

## 2025-07-15 RX ORDER — MAGNESIUM SULFATE HEPTAHYDRATE 40 MG/ML
4 INJECTION, SOLUTION INTRAVENOUS ONCE
Status: COMPLETED | OUTPATIENT
Start: 2025-07-15 | End: 2025-07-16

## 2025-07-15 RX ORDER — CHLORDIAZEPOXIDE HYDROCHLORIDE 25 MG/1
50 CAPSULE, GELATIN COATED ORAL ONCE
Status: COMPLETED | OUTPATIENT
Start: 2025-07-15 | End: 2025-07-15

## 2025-07-15 RX ORDER — MAGNESIUM SULFATE HEPTAHYDRATE 40 MG/ML
2 INJECTION, SOLUTION INTRAVENOUS ONCE
Status: DISCONTINUED | OUTPATIENT
Start: 2025-07-15 | End: 2025-07-15

## 2025-07-15 RX ORDER — HYDROXYZINE HYDROCHLORIDE 25 MG/1
25 TABLET, FILM COATED ORAL EVERY 6 HOURS PRN
Status: DISCONTINUED | OUTPATIENT
Start: 2025-07-15 | End: 2025-07-16 | Stop reason: HOSPADM

## 2025-07-15 RX ORDER — CEPHALEXIN 500 MG/1
500 CAPSULE ORAL ONCE
Status: DISCONTINUED | OUTPATIENT
Start: 2025-07-15 | End: 2025-07-15

## 2025-07-15 RX ORDER — LANOLIN ALCOHOL/MO/W.PET/CERES
100 CREAM (GRAM) TOPICAL DAILY
Status: DISCONTINUED | OUTPATIENT
Start: 2025-07-16 | End: 2025-07-16 | Stop reason: HOSPADM

## 2025-07-15 RX ORDER — CLONIDINE HYDROCHLORIDE 0.1 MG/1
0.2 TABLET ORAL EVERY 8 HOURS SCHEDULED
Status: DISCONTINUED | OUTPATIENT
Start: 2025-07-15 | End: 2025-07-16 | Stop reason: HOSPADM

## 2025-07-15 RX ORDER — CEPHALEXIN 500 MG/1
500 CAPSULE ORAL EVERY 6 HOURS SCHEDULED
Status: DISCONTINUED | OUTPATIENT
Start: 2025-07-16 | End: 2025-07-16 | Stop reason: HOSPADM

## 2025-07-15 RX ORDER — TAMSULOSIN HYDROCHLORIDE 0.4 MG/1
0.4 CAPSULE ORAL
Status: DISCONTINUED | OUTPATIENT
Start: 2025-07-16 | End: 2025-07-16 | Stop reason: HOSPADM

## 2025-07-15 RX ORDER — ATORVASTATIN CALCIUM 40 MG/1
40 TABLET, FILM COATED ORAL DAILY
Status: DISCONTINUED | OUTPATIENT
Start: 2025-07-16 | End: 2025-07-16 | Stop reason: HOSPADM

## 2025-07-15 RX ORDER — ALBUTEROL SULFATE 90 UG/1
2 INHALANT RESPIRATORY (INHALATION) EVERY 6 HOURS PRN
Status: DISCONTINUED | OUTPATIENT
Start: 2025-07-15 | End: 2025-07-16 | Stop reason: HOSPADM

## 2025-07-15 RX ORDER — ONDANSETRON 2 MG/ML
4 INJECTION INTRAMUSCULAR; INTRAVENOUS EVERY 6 HOURS PRN
Status: DISCONTINUED | OUTPATIENT
Start: 2025-07-15 | End: 2025-07-16 | Stop reason: HOSPADM

## 2025-07-15 RX ADMIN — METOROPROLOL TARTRATE 5 MG: 5 INJECTION, SOLUTION INTRAVENOUS at 21:18

## 2025-07-15 RX ADMIN — CHLORDIAZEPOXIDE HYDROCHLORIDE 50 MG: 25 CAPSULE ORAL at 08:49

## 2025-07-15 RX ADMIN — KETOROLAC TROMETHAMINE 15 MG: 30 INJECTION, SOLUTION INTRAMUSCULAR at 08:49

## 2025-07-15 RX ADMIN — THIAMINE HYDROCHLORIDE 100 MG: 100 INJECTION, SOLUTION INTRAMUSCULAR; INTRAVENOUS at 18:59

## 2025-07-15 RX ADMIN — MAGNESIUM SULFATE HEPTAHYDRATE 4 G: 40 INJECTION, SOLUTION INTRAVENOUS at 20:15

## 2025-07-15 RX ADMIN — LORAZEPAM 1 MG: 2 INJECTION INTRAMUSCULAR; INTRAVENOUS at 21:17

## 2025-07-15 RX ADMIN — SODIUM CHLORIDE, SODIUM LACTATE, POTASSIUM CHLORIDE, AND CALCIUM CHLORIDE 1000 ML: .6; .31; .03; .02 INJECTION, SOLUTION INTRAVENOUS at 17:46

## 2025-07-15 NOTE — H&P
H&P - Hospitalist   Name: Gregg Partida 65 y.o. male I MRN: [id number]  Unit/Bed#: ED HW3 I Date of Admission: 7/15/2025   Date of Service: 7/15/2025 I Hospital Day: 0     Assessment & Plan  Alcohol intoxication (HCC)  65-year-old male with PMHx CAD s/p CABG, COPD, HTN, history C. difficile, pancytopenia, frequent alcohol intoxication and withdrawal, A-fib on Eliquis, medication noncompliance, hypothyroid, CHF, depression, diabetes, frequent falls, and GERD who presents to ED with alcohol intoxication.   Ethanol on admission 391.  Initially treated in ED with LR bolus, IV thiamine.  Patient states he is amendable to detox, however also tells me that he will be leaving tomorrow morning.  Patient currently still intoxicated.  Appears comfortable, denies any complaints.  Chart reviewed, patient has history of frequent alcohol withdrawal, alcohol intoxication.  Complete alcohol cessation emphasized  CIWA protocol  Continuous telemetry monitoring  Start ATC clonidine, gabapentin and as needed Atarax  Continue PTA thiamine, multivitamin, and folic acid  SIRS (systemic inflammatory response syndrome) (HCC)  SIRS, POA, tachycardia, and leukopenia.  Patient has chronic leukopenia at baseline, tachycardia from A fib with RVR, so at this time no suspected source of infection.  Monitor clinically at this time  Atrial fibrillation with rapid ventricular response (HCC)  Initially on admission ECG showing A-fib with RVR, , QTc 469, nonischemic.  Currently rate controlled with HR 80s  Continue PTA metoprolol, Cardizem and Eliquis  Continuous telemetry monitoring  Pancytopenia (HCC)  Chronic pancytopenia with decreased WBC, RBC and platelets.  Stable  Monitor with CBC in a.m.  Hypomagnesemia  Hypomagnesemia with magnesium on admission 1.1  Repleted in ED  Trend with mag in a.m., replete as needed  Housing instability  Patient endorses to me that he is not homeless however per EMS patient's apartment is not fit for living.    On exam, patient is disheveled, unkempt, clothing with blood and dirt stains.  Appreciate case management consultation  Noncompliance  Seen by psychiatry during ED visit from the morning, was deemed to have medical decision-making capacity.  Chart reviewed 7 ED visits in July, 13 ED visits in June, admission in June for A-fib with RVR, fall, medication noncompliance, and alcohol withdrawal.  Most recent admission 6/30-7/4 for acute respiratory failure with hypoxia, A-fib with RVR due to noncompliance with home medications and SIRS.  Chronic heart failure with preserved ejection fraction (HCC)  Wt Readings from Last 3 Encounters:   07/01/25 78.8 kg (173 lb 11.6 oz)   06/27/25 79.4 kg (175 lb)   06/22/25 80.5 kg (177 lb 7.5 oz)   Euvolemic  Most recent echo 2/2025 EF 50-54%, reduced systolic function, intermediate diastolic function, wall motion cannot be accurately assessed.  Daily weights, intakes and outputs  Dyslipidemia  PTA atorvastatin  CAD (coronary artery disease)  Continue PTA aspirin statin and ranolazine  Nicotine abuse  Nicotine patch ordered  Hypothyroidism  Not on medications for this  Fall  History of frequent falls  Fall precautions  COPD (chronic obstructive pulmonary disease) (HCC)  Appears stable on room air, no S/S acute exacerbation  Continue PTA inhalers  History of Clostridium difficile infection  Start prophylactic vancomycin while on antibiotics  Wounds, multiple  Multiple abrasions, skin tears, and scratches bilateral upper and lower extremities which patient endorses due to frequent falls.  Recently started on cephalexin for course 7/13 - 7/20 for skin and soft tissue infection.  Areas appear stable, patient picks at them.  No signs of infection.  Continue cephalexin  Wound care consultation ordered      VTE Pharmacologic Prophylaxis: VTE Score: 2 Low Risk (Score 0-2) - Encourage Ambulation.  Code Status: Level 1 - Full Code   Discussion with family: No.     Anticipated Length of Stay:  Patient will be admitted on an inpatient basis with an anticipated length of stay of greater than 2 midnights secondary to alcohol intoxication, hypomagnesemia, SIRS, A-fib with RVR, housing instability.    History of Present Illness   Chief Complaint: Alcohol intoxication, housing instability    Gregg Partida is a 65 y.o. male with a PMH of CAD s/p CABG, COPD, HTN, history C. difficile, pancytopenia, frequent alcohol intoxication and withdrawal, A-fib on Eliquis, medication noncompliance, hypothyroid, CHF, depression, diabetes, frequent falls, and GERD who presents to ED with alcohol intoxication.   Ethanol on admission 391. Initially treated in ED with LR bolus, IV thiamine. Patient states he is amendable to detox, however also tells me that he will be leaving tomorrow morning.  Patient currently still intoxicated.  Appears comfortable, denies any complaints. Chart reviewed, patient has history of frequent alcohol withdrawal, alcohol intoxication. SIRS, POA, tachycardia, and leukopenia.  Patient has chronic leukopenia at baseline, tachycardia from A fib with RVR, so at this time no suspected source of infection. Initially on admission ECG showing A-fib with RVR, , QTc 469, nonischemic. Currently rate controlled with HR 80s. Hypomagnesemia with magnesium on admission 1.1. Patient endorses to me that he is not homeless however per EMS patient's apartment is not fit for living.  On exam, patient is disheveled, unkempt, clothing with blood and dirt stains. Chart reviewed 7 ED visits in July, 13 ED visits in June, admission in June for A-fib with RVR, fall, medication noncompliance, and alcohol withdrawal. Most recent admission 6/30-7/4 for acute respiratory failure with hypoxia, A-fib with RVR due to noncompliance with home medications and SIRS. Multiple abrasions, skin tears, and scratches bilateral upper and lower extremities which patient endorses due to frequent falls. Recently started on cephalexin for  course 7/13 - 7/20 for skin and soft tissue infection. Areas appear stable, patient picks at them.  No signs of infection.    Review of Systems   Constitutional:  Negative for chills and fever.   HENT:  Negative for ear pain and sore throat.    Eyes:  Negative for pain and visual disturbance.   Respiratory:  Negative for cough and shortness of breath.    Cardiovascular:  Negative for chest pain and palpitations.   Gastrointestinal:  Negative for abdominal pain and vomiting.   Genitourinary:  Negative for dysuria.   Musculoskeletal:  Negative for arthralgias and back pain.   Skin:  Negative for color change and rash.   Neurological:  Negative for seizures and syncope.   All other systems reviewed and are negative.      Historical Information   Past Medical History[1]  Past Surgical History[2]  Social History[3]  E-Cigarette/Vaping    E-Cigarette Use Never User      E-Cigarette/Vaping Substances    Nicotine Yes     THC No     CBD No     Flavoring No     Other No     Unknown No      Family History[4]  Social History:  Marital Status: Single   Occupation: Unemployed  Patient Pre-hospital Living Situation: Apartment  Patient Pre-hospital Level of Mobility: walks  Patient Pre-hospital Diet Restrictions: None    Meds/Allergies   I have reviewed home medications using recent Epic encounter.  Prior to Admission medications    Medication Sig Start Date End Date Taking? Authorizing Provider   albuterol (2.5 mg/3 mL) 0.083 % nebulizer solution Take 3 mL (2.5 mg total) by nebulization 3 (three) times a day 6/12/25   Mary Ann Stockton, DO   albuterol (PROVENTIL HFA,VENTOLIN HFA) 90 mcg/act inhaler Inhale 2 puffs every 6 (six) hours as needed for wheezing or shortness of breath 6/12/25   Mary Ann Stockton, DO   apixaban (Eliquis) 5 mg Take 1 tablet (5 mg total) by mouth in the morning and 1 tablet (5 mg total) in the evening. 6/12/25   Mary Ann Stockton, DO   aspirin 81 mg chewable tablet Chew 1 tablet (81 mg total) in the morning.  6/12/25   Mary Ann Stockton DO   atorvastatin (LIPITOR) 40 mg tablet Take 1 tablet (40 mg total) by mouth in the morning. 6/12/25   Mary Ann Stockton DO   Blood Glucose Monitoring Suppl (ONE TOUCH ULTRA MINI) w/Device KIT Use as directed  Patient not taking: Reported on 6/5/2025 5/16/19   Historical Provider, MD   Blood Pressure Monitoring (Adult Blood Pressure Cuff Lg) KIT Use in the morning  Patient not taking: Reported on 6/5/2025 12/14/23   Elliott Zimmerman MD   cephalexin (KEFLEX) 500 mg capsule Take 1 capsule (500 mg total) by mouth every 6 (six) hours for 7 days 7/13/25 7/20/25  Valentin Monk DO   dextromethorphan-guaiFENesin (ROBITUSSIN DM)  mg/5 mL syrup Take 5 mL by mouth 3 (three) times a day as needed for cough 7/14/25   Valentin Monk DO   diltiazem (CARDIZEM SR) 120 mg 12 hr capsule Take 1 capsule (120 mg total) by mouth in the morning and 1 capsule (120 mg total) in the evening. 6/12/25   Mary Ann Stockton DO   ferrous sulfate 325 (65 Fe) mg tablet Take 1 tablet (325 mg total) by mouth daily with breakfast 6/12/25   Mary Ann Stockton DO   fluticasone-vilanterol 200-25 mcg/actuation inhaler Inhale 1 puff in the morning. Rinse mouth after use. 6/12/25   Mary Ann Stockton DO   folic acid (FOLVITE) 400 mcg tablet Take 1 tablet (400 mcg total) by mouth in the morning. 6/12/25   Mary Ann Stockton DO   gabapentin (NEURONTIN) 300 mg capsule Take 1 capsule (300 mg total) by mouth in the morning and 1 capsule (300 mg total) in the evening and 1 capsule (300 mg total) before bedtime. 6/12/25   Mary Ann Stockton DO   metoprolol succinate (TOPROL-XL) 100 mg 24 hr tablet Take 1 tablet (100 mg total) by mouth daily 6/12/25   Mary Ann Stockton DO   ONETOUCH DELICA LANCETS FINE MISC in the morning and in the evening and before bedtime. Test.  Patient not taking: Reported on 6/5/2025 5/16/19   Historical Provider, MD   pantoprazole (PROTONIX) 40 mg tablet Take 1 tablet (40 mg total) by mouth in the  morning. 6/12/25   Mary Ann Stockton DO   ranolazine (RANEXA) 500 mg 12 hr tablet Take 1 tablet (500 mg total) by mouth in the morning and 1 tablet (500 mg total) in the evening. 6/12/25   Mary Ann Stockton DO   saccharomyces boulardii (FLORASTOR) 250 mg capsule Take 1 capsule (250 mg total) by mouth in the morning and 1 capsule (250 mg total) in the evening. 6/12/25   Mary Ann Stockton DO   tamsulosin (FLOMAX) 0.4 mg Take 1 capsule (0.4 mg total) by mouth daily with dinner 6/12/25   Mary Ann Stockton DO   Thiamine Mononitrate (VITAMIN B1) 100 mg tablet Take 1 tablet (100 mg total) by mouth in the morning. 6/12/25   Mary Ann Stockton DO   torsemide (DEMADEX) 10 mg tablet Take 1 tablet (10 mg total) by mouth every other day 6/12/25 7/12/25  Mary Ann Stockton DO     No Known Allergies    Objective :  Temp:  [97.5 °F (36.4 °C)-97.9 °F (36.6 °C)] 97.9 °F (36.6 °C)  HR:  [] 97  BP: (106-120)/(56-71) 106/58  Resp:  [18-20] 20  SpO2:  [91 %-94 %] 91 %  O2 Device: None (Room air)    Physical Exam  Vitals and nursing note reviewed.   Constitutional:       General: He is not in acute distress.     Appearance: He is well-developed. He is not ill-appearing, toxic-appearing or diaphoretic.   HENT:      Head: Normocephalic and atraumatic.      Mouth/Throat:      Mouth: Mucous membranes are moist.      Pharynx: Oropharynx is clear.     Eyes:      Conjunctiva/sclera: Conjunctivae normal.       Cardiovascular:      Rate and Rhythm: Normal rate and regular rhythm.      Pulses: Normal pulses.      Heart sounds: Normal heart sounds. No murmur heard.  Pulmonary:      Effort: Pulmonary effort is normal. No respiratory distress.      Breath sounds: Normal breath sounds. No wheezing, rhonchi or rales.   Abdominal:      General: There is no distension.      Palpations: Abdomen is soft.      Tenderness: There is no abdominal tenderness. There is no guarding.     Musculoskeletal:         General: No swelling.      Cervical back: Neck  supple.     Skin:     General: Skin is warm and dry.      Capillary Refill: Capillary refill takes less than 2 seconds.     Neurological:      Mental Status: He is alert and easily aroused.     Psychiatric:         Mood and Affect: Mood normal. Affect is flat.         Speech: Speech is delayed.         Behavior: Behavior is slowed. Behavior is cooperative.          Lines/Drains:            Lab Results: I have reviewed the following results:  Results from last 7 days   Lab Units 07/15/25  1739   WBC Thousand/uL 3.73*   HEMOGLOBIN g/dL 10.0*   HEMATOCRIT % 31.7*   PLATELETS Thousands/uL 126*   SEGS PCT % 52   LYMPHO PCT % 30   MONO PCT % 12   EOS PCT % 2     Results from last 7 days   Lab Units 07/15/25  1739   SODIUM mmol/L 142   POTASSIUM mmol/L 3.9   CHLORIDE mmol/L 105   CO2 mmol/L 25   BUN mg/dL 15   CREATININE mg/dL 1.07   ANION GAP mmol/L 12   CALCIUM mg/dL 7.5*   ALBUMIN g/dL 3.3*   TOTAL BILIRUBIN mg/dL 0.48   ALK PHOS U/L 130*   ALT U/L 26   AST U/L 74*   GLUCOSE RANDOM mg/dL 83             Lab Results   Component Value Date    HGBA1C 5.2 05/05/2025    HGBA1C 5.4 02/15/2025    HGBA1C 5.2 11/14/2024           Imaging Results Review: I reviewed radiology reports from this admission including: CT head.  Other Study Results Review: EKG was personally reviewed and my interpretation is: A-fib with RVR, , QTc 469, nonischemic..    Administrative Statements       ** Please Note: This note has been constructed using a voice recognition system. **         [1]   Past Medical History:  Diagnosis Date    Acute encephalopathy 06/07/2025    Acute on chronic kidney failure  (HCC)     Alcohol abuse     Alcohol withdrawal (HCC) 10/09/2018    Atrial fibrillation (HCC)     Cancer (HCC)     prostate ca,had radiation    Cardiac disease     stents,then triple bypass    COPD (chronic obstructive pulmonary disease) (HCC)     Coronary artery disease     Elevated troponin 09/28/2023    ETOH abuse     Heart failure (HCC)      "History of heart surgery     says triple bypass [facility]    Hx of heart artery stent     2014    Hyperlipidemia     Hypertension     Hypoglycemia 06/05/2025    Hyponatremia 10/17/2019    Hypovolemic shock (HCC) 12/22/2019    Increased anion gap 04/21/2021    Lumbar spondylitis (HCC) 10/13/2022    Metabolic acidosis, increased anion gap 04/21/2021    Nasal bone fracture 10/10/2022    Prostate CA ([facility])     S/P CABG x 3     2004    Sleep apnea    [2]   Past Surgical History:  Procedure Laterality Date    CARDIAC CATHETERIZATION      2 stents    CORONARY ARTERY BYPASS GRAFT      CORONARY ARTERY BYPASS GRAFT  2004    [place] ARTHRD ANT INTERBODY MIN DSC CRV BELOW C2 N/A 12/16/2020    Procedure: Anterior cervical discectomy with fusion C4-C7; Posterior cervical decompression and fusion C2-T2;  Surgeon: David Rowell MD;  Location: [facility];  Service: Neurosurgery    TONSILLECTOMY     [3]   Social History  Tobacco Use    Smoking status: Every Day     Current packs/day: 1.50     Average packs/day: 1.5 packs/day for 40.0 years (60.0 ttl pk-yrs)     Types: Cigarettes    Smokeless tobacco: Never   Vaping Use    Vaping status: Never Used   Substance and Sexual Activity    Alcohol use: Yes     Alcohol/week: 4.0 standard drinks of alcohol     Types: 4 Standard drinks or equivalent per week     Comment: \"quart a day\"    Drug use: No    Sexual activity: Not Currently   [4]   Family History  Problem Relation Name Age of Onset    Diabetes Mother      Uterine cancer Mother      COPD Father      Hypertension Father       "
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
## 2025-07-16 ENCOUNTER — TELEPHONE (OUTPATIENT)
Age: 65
End: 2025-07-16

## 2025-07-16 VITALS
HEIGHT: 74 IN | DIASTOLIC BLOOD PRESSURE: 84 MMHG | RESPIRATION RATE: 18 BRPM | BODY MASS INDEX: 20.6 KG/M2 | HEART RATE: 110 BPM | SYSTOLIC BLOOD PRESSURE: 136 MMHG | WEIGHT: 160.5 LBS | TEMPERATURE: 99.1 F | OXYGEN SATURATION: 98 %

## 2025-07-16 LAB
AMPHETAMINES SERPL QL SCN: NEGATIVE
ANION GAP SERPL CALCULATED.3IONS-SCNC: 10 MMOL/L (ref 4–13)
BACTERIA UR QL AUTO: ABNORMAL /HPF
BARBITURATES UR QL: NEGATIVE
BENZODIAZ UR QL: POSITIVE
BILIRUB UR QL STRIP: NEGATIVE
BUN SERPL-MCNC: 14 MG/DL (ref 5–25)
CALCIUM SERPL-MCNC: 7.6 MG/DL (ref 8.4–10.2)
CHLORIDE SERPL-SCNC: 105 MMOL/L (ref 96–108)
CLARITY UR: CLEAR
CO2 SERPL-SCNC: 27 MMOL/L (ref 21–32)
COCAINE UR QL: NEGATIVE
COLOR UR: YELLOW
CREAT SERPL-MCNC: 0.84 MG/DL (ref 0.6–1.3)
ERYTHROCYTE [DISTWIDTH] IN BLOOD BY AUTOMATED COUNT: 18.7 % (ref 11.6–15.1)
FENTANYL UR QL SCN: NEGATIVE
GFR SERPL CREATININE-BSD FRML MDRD: 93 ML/MIN/1.73SQ M
GLUCOSE SERPL-MCNC: 75 MG/DL (ref 65–140)
GLUCOSE UR STRIP-MCNC: NEGATIVE MG/DL
HCT VFR BLD AUTO: 31.6 % (ref 36.5–49.3)
HGB BLD-MCNC: 10.1 G/DL (ref 12–17)
HGB UR QL STRIP.AUTO: NEGATIVE
HYALINE CASTS #/AREA URNS LPF: ABNORMAL /LPF
HYDROCODONE UR QL SCN: NEGATIVE
KETONES UR STRIP-MCNC: NEGATIVE MG/DL
LEUKOCYTE ESTERASE UR QL STRIP: NEGATIVE
MAGNESIUM SERPL-MCNC: 2 MG/DL (ref 1.9–2.7)
MCH RBC QN AUTO: 30.8 PG (ref 26.8–34.3)
MCHC RBC AUTO-ENTMCNC: 32 G/DL (ref 31.4–37.4)
MCV RBC AUTO: 96 FL (ref 82–98)
METHADONE UR QL: NEGATIVE
NITRITE UR QL STRIP: NEGATIVE
NON-SQ EPI CELLS URNS QL MICRO: ABNORMAL /HPF
OPIATES UR QL SCN: NEGATIVE
OXYCODONE+OXYMORPHONE UR QL SCN: NEGATIVE
PCP UR QL: NEGATIVE
PH UR STRIP.AUTO: 5.5 [PH]
PLATELET # BLD AUTO: 105 THOUSANDS/UL (ref 149–390)
PMV BLD AUTO: 10.6 FL (ref 8.9–12.7)
POTASSIUM SERPL-SCNC: 3.9 MMOL/L (ref 3.5–5.3)
PROT UR STRIP-MCNC: ABNORMAL MG/DL
RBC # BLD AUTO: 3.28 MILLION/UL (ref 3.88–5.62)
RBC #/AREA URNS AUTO: ABNORMAL /HPF
SODIUM SERPL-SCNC: 142 MMOL/L (ref 135–147)
SP GR UR STRIP.AUTO: 1.02 (ref 1–1.03)
THC UR QL: NEGATIVE
TRANS CELLS #/AREA URNS HPF: PRESENT /[HPF]
UROBILINOGEN UR STRIP-ACNC: <2 MG/DL
WBC # BLD AUTO: 3.18 THOUSAND/UL (ref 4.31–10.16)
WBC #/AREA URNS AUTO: ABNORMAL /HPF

## 2025-07-16 PROCEDURE — 85027 COMPLETE CBC AUTOMATED: CPT

## 2025-07-16 PROCEDURE — 80307 DRUG TEST PRSMV CHEM ANLYZR: CPT

## 2025-07-16 PROCEDURE — 81001 URINALYSIS AUTO W/SCOPE: CPT

## 2025-07-16 PROCEDURE — 99239 HOSP IP/OBS DSCHRG MGMT >30: CPT | Performed by: INTERNAL MEDICINE

## 2025-07-16 PROCEDURE — 83735 ASSAY OF MAGNESIUM: CPT

## 2025-07-16 PROCEDURE — 80048 BASIC METABOLIC PNL TOTAL CA: CPT

## 2025-07-16 RX ORDER — LORAZEPAM 2 MG/ML
2 INJECTION INTRAMUSCULAR ONCE
Status: COMPLETED | OUTPATIENT
Start: 2025-07-16 | End: 2025-07-16

## 2025-07-16 RX ORDER — DILTIAZEM HYDROCHLORIDE 5 MG/ML
15 INJECTION INTRAVENOUS ONCE
Status: COMPLETED | OUTPATIENT
Start: 2025-07-16 | End: 2025-07-16

## 2025-07-16 RX ADMIN — GABAPENTIN 300 MG: 300 CAPSULE ORAL at 08:22

## 2025-07-16 RX ADMIN — DILTIAZEM HYDROCHLORIDE 15 MG: 5 INJECTION, SOLUTION INTRAVENOUS at 02:58

## 2025-07-16 RX ADMIN — RANOLAZINE 500 MG: 500 TABLET, FILM COATED, EXTENDED RELEASE ORAL at 08:22

## 2025-07-16 RX ADMIN — PANTOPRAZOLE SODIUM 40 MG: 40 TABLET, DELAYED RELEASE ORAL at 08:22

## 2025-07-16 RX ADMIN — METOPROLOL SUCCINATE 100 MG: 100 TABLET, EXTENDED RELEASE ORAL at 08:22

## 2025-07-16 RX ADMIN — ASPIRIN 81 MG: 81 TABLET, CHEWABLE ORAL at 08:22

## 2025-07-16 RX ADMIN — FOLIC ACID TAB 400 MCG 400 MCG: 400 TAB at 08:22

## 2025-07-16 RX ADMIN — DILTIAZEM HYDROCHLORIDE 120 MG: 60 CAPSULE, EXTENDED RELEASE ORAL at 08:23

## 2025-07-16 RX ADMIN — CEPHALEXIN 500 MG: 500 CAPSULE ORAL at 11:36

## 2025-07-16 RX ADMIN — CEPHALEXIN 500 MG: 500 CAPSULE ORAL at 05:05

## 2025-07-16 RX ADMIN — FERROUS SULFATE TAB 325 MG (65 MG ELEMENTAL FE) 325 MG: 325 (65 FE) TAB at 08:22

## 2025-07-16 RX ADMIN — CLONIDINE HYDROCHLORIDE 0.2 MG: 0.1 TABLET ORAL at 05:05

## 2025-07-16 RX ADMIN — VANCOMYCIN HYDROCHLORIDE 125 MG: 125 CAPSULE ORAL at 08:22

## 2025-07-16 RX ADMIN — ATORVASTATIN CALCIUM 40 MG: 40 TABLET, FILM COATED ORAL at 08:22

## 2025-07-16 RX ADMIN — NICOTINE 1 PATCH: 21 PATCH, EXTENDED RELEASE TRANSDERMAL at 08:27

## 2025-07-16 RX ADMIN — LORAZEPAM 2 MG: 2 INJECTION INTRAMUSCULAR; INTRAVENOUS at 02:58

## 2025-07-16 RX ADMIN — LORAZEPAM 2 MG: 2 INJECTION INTRAMUSCULAR; INTRAVENOUS at 08:45

## 2025-07-16 RX ADMIN — THIAMINE HCL TAB 100 MG 100 MG: 100 TAB at 08:23

## 2025-07-16 RX ADMIN — APIXABAN 5 MG: 5 TABLET, FILM COATED ORAL at 08:23

## 2025-07-16 RX ADMIN — Medication 250 MG: at 08:22

## 2025-07-16 NOTE — ASSESSMENT & PLAN NOTE
Wt Readings from Last 3 Encounters:   07/15/25 72.8 kg (160 lb 8 oz)   07/01/25 78.8 kg (173 lb 11.6 oz)   06/27/25 79.4 kg (175 lb)   Euvolemic  Most recent echo 2/2025 EF 50-54%, reduced systolic function, intermediate diastolic function, wall motion cannot be accurately assessed.

## 2025-07-16 NOTE — ASSESSMENT & PLAN NOTE
Patient endorses to me that he is not homeless however per EMS patient's apartment is not fit for living.   Patient deferred rehab on discharge.  Elected to go back to his apartment.
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
## 2025-07-16 NOTE — ASSESSMENT & PLAN NOTE
Multiple abrasions, skin tears, and scratches bilateral upper and lower extremities which patient endorses due to frequent falls.  Recently started on cephalexin for course 7/13 - 7/20 for skin and soft tissue infection.  Areas appear stable, patient picks at them.  No signs of infection.  Continue cephalexin as previously prescribed

## 2025-07-16 NOTE — PLAN OF CARE
Problem: Potential for Falls  Goal: Patient will remain free of falls  Description: INTERVENTIONS:  - Educate patient/family on patient safety including physical limitations  - Instruct patient to call for assistance with activity   - Consider consulting OT/PT to assist with strengthening/mobility based on AM PAC & JH-HLM score  - Consult OT/PT to assist with strengthening/mobility   - Keep Call bell within reach  - Keep bed low and locked with side rails adjusted as appropriate  - Keep care items and personal belongings within reach  - Initiate and maintain comfort rounds  - Make Fall Risk Sign visible to staff  - Offer Toileting every 2 Hours, in advance of need  - Initiate/Maintain bed alarm  - Obtain necessary fall risk management equipment: call bell within reach   - Apply yellow socks and bracelet for high fall risk patients  - Consider moving patient to room near nurses station  Outcome: Progressing     Problem: PAIN - ADULT  Goal: Verbalizes/displays adequate comfort level or baseline comfort level  Description: Interventions:  - Encourage patient to monitor pain and request assistance  - Assess pain using appropriate pain scale  - Administer analgesics as ordered based on type and severity of pain and evaluate response  - Implement non-pharmacological measures as appropriate and evaluate response  - Consider cultural and social influences on pain and pain management  - Notify physician/advanced practitioner if interventions unsuccessful or patient reports new pain  - Educate patient/family on pain management process including their role and importance of  reporting pain   - Provide non-pharmacologic/complimentary pain relief interventions  Outcome: Progressing     Problem: DISCHARGE PLANNING  Goal: Discharge to home or other facility with appropriate resources  Description: INTERVENTIONS:  - Identify barriers to discharge w/patient and caregiver  - Arrange for needed discharge resources and transportation  as appropriate  - Identify discharge learning needs (meds, wound care, etc.)  - Arrange for interpretive services to assist at discharge as needed  - Refer to Case Management Department for coordinating discharge planning if the patient needs post-hospital services based on physician/advanced practitioner order or complex needs related to functional status, cognitive ability, or social support system  Outcome: Progressing     Problem: Knowledge Deficit  Goal: Patient/family/caregiver demonstrates understanding of disease process, treatment plan, medications, and discharge instructions  Description: Complete learning assessment and assess knowledge base.  Interventions:  - Provide teaching at level of understanding  - Provide teaching via preferred learning methods  Outcome: Progressing     Problem: NEUROSENSORY - ADULT  Goal: Achieves stable or improved neurological status  Description: INTERVENTIONS  - Monitor and report changes in neurological status  - Monitor vital signs such as temperature, blood pressure, glucose, and any other labs ordered   - Initiate measures to prevent increased intracranial pressure  - Monitor for seizure activity and implement precautions if appropriate      Outcome: Progressing  Goal: Remains free of injury related to seizures activity  Description: INTERVENTIONS  - Maintain airway, patient safety  and administer oxygen as ordered  - Monitor patient for seizure activity, document and report duration and description of seizure to physician/advanced practitioner  - If seizure occurs,  ensure patient safety during seizure  - Reorient patient post seizure  - Seizure pads on all 4 side rails  - Instruct patient/family to notify RN of any seizure activity including if an aura is experienced  - Instruct patient/family to call for assistance with activity based on nursing assessment  - Administer anti-seizure medications if ordered    Outcome: Progressing  Goal: Achieves maximal functionality  and self care  Description: INTERVENTIONS  - Monitor swallowing and airway patency with patient fatigue and changes in neurological status  - Encourage and assist patient to increase activity and self care.   - Encourage visually impaired, hearing impaired and aphasic patients to use assistive/communication devices  Outcome: Progressing     Problem: CARDIOVASCULAR - ADULT  Goal: Maintains optimal cardiac output and hemodynamic stability  Description: INTERVENTIONS:  - Monitor I/O, vital signs and rhythm  - Monitor for S/S and trends of decreased cardiac output  - Administer and titrate ordered vasoactive medications to optimize hemodynamic stability  - Assess quality of pulses, skin color and temperature  - Assess for signs of decreased coronary artery perfusion  - Instruct patient to report change in severity of symptoms  Outcome: Progressing  Goal: Absence of cardiac dysrhythmias or at baseline rhythm  Description: INTERVENTIONS:  - Continuous cardiac monitoring, vital signs, obtain 12 lead EKG if ordered  - Administer antiarrhythmic and heart rate control medications as ordered  - Monitor electrolytes and administer replacement therapy as ordered  Outcome: Progressing     Problem: RESPIRATORY - ADULT  Goal: Achieves optimal ventilation and oxygenation  Description: INTERVENTIONS:  - Assess for changes in respiratory status  - Assess for changes in mentation and behavior  - Position to facilitate oxygenation and minimize respiratory effort  - Oxygen administered by appropriate delivery if ordered  - Initiate smoking cessation education as indicated  - Encourage broncho-pulmonary hygiene including cough, deep breathe, Incentive Spirometry  - Assess the need for suctioning and aspirate as needed  - Assess and instruct to report SOB or any respiratory difficulty  - Respiratory Therapy support as indicated  Outcome: Progressing     Problem: Prexisting or High Potential for Compromised Skin Integrity  Goal: Skin  integrity is maintained or improved  Description: INTERVENTIONS:  - Identify patients at risk for skin breakdown  - Assess and monitor skin integrity including under and around medical devices   - Assess and monitor nutrition and hydration status  - Monitor labs  - Assess for incontinence   - Turn and reposition patient  - Assist with mobility/ambulation  - Relieve pressure over eduardo prominences   - Avoid friction and shearing  - Provide appropriate hygiene as needed including keeping skin clean and dry  - Evaluate need for skin moisturizer/barrier cream  - Collaborate with interdisciplinary team  - Patient/family teaching  - Consider wound care consult    Assess:  - Review Herrera scale daily  - Clean and moisturize skin   - Inspect skin when repositioning, toileting, and assisting with ADLS  - Assess under medical devices   - Assess extremities for adequate circulation and sensation     Bed Management:  - Have minimal linens on bed & keep smooth, unwrinkled  - Change linens as needed when moist or perspiring  - Avoid sitting or lying in one position for more than 2 hours while in bed?Keep HOB at 30 degrees   - Toileting:  - Offer bedside commode  - Assess for incontinence   - Use incontinent care products after each incontinent episode     Activity:  - Mobilize patient 2 times a day  - Encourage activity and walks on unit  - Encourage or provide ROM exercises   - Turn and reposition patient every 2 Hours  - Use appropriate equipment to lift or move patient in bed  - Instruct/ Assist with weight shifting every 2 hours  when out of bed in chair  - Consider limitation of chair time 2 hour intervals    Skin Care:  - Avoid use of baby powder, tape, friction and shearing, hot water or constrictive clothing  - Relieve pressure over bony prominences   - Do not massage red bony areas    Next Steps:  - Teach patient strategies to minimize risks   - Consider consults to  interdisciplinary teams such as OT/ PT   Outcome:  Progressing

## 2025-07-16 NOTE — ASSESSMENT & PLAN NOTE
patient notably noncompliant at home  Continue home Cardizem, metoprolol for rate control, continue Eliquis for stroke prevention.

## 2025-07-16 NOTE — ASSESSMENT & PLAN NOTE
"Patient with frequent ER visits and hospitalizations.  Presented to the ER with alcohol intoxication and ethanol level of 391.  Patient stated in the ER he was amenable to detox and post hospitalization placement at rehab.  Was treated with CIWA protocol while inpatient without any significant withdrawal symptoms.  Upon reevaluation today 7/16 patient stated he did not want any further medical treatment, any assistance getting into or transitioning to rehab on discharge.  Deferred all services from social service perspective.  He \"just wanted a lyft home \".  Patient discharged home  "

## 2025-07-16 NOTE — NURSING NOTE
Pt discharged home. All pt education completed however pt is non receptive to education. All pt belongings given to pt.

## 2025-07-16 NOTE — DISCHARGE INSTR - OTHER ORDERS
Wound Care Plan:  1-Cleanse wound to left leg and left posterior arm with wound cleanser or mild soap and water and pat dry. Apply Betadine to open areas, ABD, rolled gauze and tape. Change 3x/week and as needed for soilage/dislodgement.  2-Cleanse wound to left 3rd finger with wound cleanser or mild soap and water and pat dry. Apply Dermagran cut to size of wound, gauze, rolled gauze and tape. Change 3x/week and as needed for soilage/dislodgement.  3-Cleanse wound to right medial forearm with wound cleanser and pat dry. Leave steristrips in place - if they fall off, apply 3M No Sting to periwound, gauze or ABD, rolled gauze and tape. Change 3x/week and as needed for soilage/dislodgement.  4-Apply Prevent barrier cream or equivalent to bilateral sacrum, buttock and heels 2x/day and as needed.  5-Float heels on 2 pillows in bed to offload pressure so heels are not in contact with mattress or pillows.  6-Use pressure redistribution cushion in chair when out of bed if able. Avoid prolonged sitting.  7-Moisturize skin daily with skin nourishing cream.  8-Turn/reposition every 2 hours in bed and every 15-20 minutes when out of bed to chair for pressure re-distribution on skin.   9-You may follow up at [facility]. Call Central Scheduling for appointment:   [phone].

## 2025-07-16 NOTE — DISCHARGE INSTR - AVS FIRST PAGE
Mr. Partida,  You were admitted to the hospital for assistance with potential alcohol withdrawal, as well as due to difficulties in your living situation at home.  Despite assistance offered by our , you have deferred any assistance with transitioning to a detoxification facility, alcohol rehab facility, or physical rehab for discharge.  On discharge, I recommend he continue to abstain from alcohol.  If you have difficulties caring for yourself upon returning home please contact your family physician or return to the emergency room.    It was a pleasure to care for you during your hospital stay,  [facility]  [phone]

## 2025-07-16 NOTE — TELEPHONE ENCOUNTER
Attempted contacting Patient regarding recent ED Visit dated 7/15/25.  Patient was seen in [place] ED. Reached VM, left message provide office hours and phone number.    ED:[place]  CC: Hand Pain    DX: Encounter for competency evaluation; Alcohol intoxication without complications. History of open hand wound   Time: 6:15am  Last OV: 9/2024

## 2025-07-16 NOTE — UTILIZATION REVIEW
Initial Clinical Review    Admission: Date/Time/Statement:   Admission Orders (From admission, onward)       Ordered        07/15/25 1839  INPATIENT ADMISSION  Once                          Orders Placed This Encounter   Procedures    INPATIENT ADMISSION     Standing Status:   Standing     Number of Occurrences:   1     Level of Care:   Med Surg [16]     Estimated length of stay:   More than 2 Midnights     Certification:   I certify that inpatient services are medically necessary for this patient for a duration of greater than two midnights. See H&P and MD Progress Notes for additional information about the patient's course of treatment.     ED Arrival Information       Expected   -    Arrival   7/15/2025 16:58    Acuity   Emergent              Means of arrival   Ambulance    Escorted by   [facility]    Admission type   Emergency              Arrival complaint   Alcohol intoxication             Chief Complaint   Patient presents with    Alcohol Intoxication     Pt arrived after being discharged just hours prior home and multiple consecutive visits last few days. Pt unable to care for himself at home. Spoken to by multiple staff members and ED team, pt agrees to stay and be placed for detox and assisted living/group home.       Initial Presentation:   65 yom to ER from home via EMS for hand pain and alcohol intoxication. Patient is well-known to this emergency department. Patient was just discharged from this emergency department at 5:15 AM. Patient was transported home via BLS. BLS reports that they got patient into his apartment and patient refused to get off the stretcher. They report the patient's apartment was in disarray. They reported significant clutter, urine stains, and state that there would be nowhere for patient to sit or lie. Patient refused get off the stretcher and there was no safe place to leave patient and therefore patient was brought back to the emergency department.  Hx CAD s/p CABG, COPD, HTN, history C. difficile, pancytopenia, frequent alcohol intoxication and withdrawal, A-fib on Eliquis, medication noncompliance, hypothyroid, CHF, depression, diabetes, frequent falls, and GERD. Presents intoxicated, disheveled, tachycardic, old appearing wounds L hand. Admission CT head: No acute intracranial abnormality. Stable chronic microangiopathic changes within the brain. Labs: WBC 3.73, Hgb 10, ,  Ca 7.5, Mg 1.1, ast 74, alk phos 130, alb 3.3, u/a+prot, UDS+benzo, ETOH 391.  Admitted to inpatient status for alcohol intoxication. Plan: CIWA protocol, telemetry, seizure precautions, aspiration precautions. Psyche consulted for competency eval.    Per psyche: mental competency alcohol abuse and depression   Mental Status Exam: 65 years old white male is resting in the stretcher looks drowsy but alert awake and oriented to place and person cooperative very poor historian but he is able to answer to my questions properly he is able to tell me his age date of birth current year current month and he knows that he is at the hospital because he is showing his hand that it is swollen and he had trouble in breathing patient is not confused at this time patient admits being depressed mainly because he cannot stop drinking.  Patient looks filthy and unkempt.  Memory intact.  Poor sleep poor appetite depressed anxious.  Patient denies auditory or visual hallucinations.  Patient denies suicidal or homicidal ideation.  No paranoia no delusion elucidated poor concentration.  Insight and judgments are adequate.  Patient is not agitated at this time and he cooperated with my interview and answer to all my questions   Diagnosis: Major depression recurrent  Alcohol abuse dependent  Anxiety  Insomnia  Noncompliance of the treatments.  Plan: Patient is found mentally competent at this time as described above.  I recommended to consider inpatient alcohol rehab patient refused  Patient also will have  a very poor prognosis if he does not stop drinking and does not comply with the recommended medical and psychiatric and substance abuse care.  Remeron 15 mg at bedtime.  Patient agrees to take antidepression medication.  Continue Librium for alcohol withdrawal    Anticipated Length of Stay/Certification Statement:   Patient will be admitted on an inpatient basis with an anticipated length of stay of greater than 2 midnights secondary to alcohol intoxication, hypomagnesemia, SIRS, A-fib with RVR, housing instability.       ED Treatment-Medication Administration from 07/15/2025 1658 to 07/15/2025 2039         Date/Time Order Dose Route Action     07/15/2025 1746 lactated ringers bolus 1,000 mL 1,000 mL Intravenous New Bag     07/15/2025 1859 thiamine (VITAMIN B1) 100 mg in dextrose 5 % 100 mL IVPB 100 mg Intravenous New Bag     07/15/2025 2015 magnesium sulfate 4 g/100 mL IVPB (premix) 4 g 4 g Intravenous New Bag            Scheduled Medications:  Medications 07/07 07/08 07/09 07/10 07/11 07/12 07/13 07/14 07/15 07/16   apixaban (ELIQUIS) tablet 5 mg  Dose: 5 mg  Freq: 2 times daily Route: PO  Start: 07/15/25 2000   Admin Instructions:      Order specific questions:               (3761) [C]      9929 [id number]        apixaban (ELIQUIS) tablet 5 mg  Dose: 5 mg  Freq: 2 times daily Route: PO  Start: 06/30/25 1845 End: 07/04/25 1311   Admin Instructions:      Order specific questions:                   aspirin chewable tablet 81 mg  Dose: 81 mg  Freq: Daily Route: PO  Start: 07/16/25 0900 0822        aspirin chewable tablet 81 mg  Dose: 81 mg  Freq: Daily Route: PO  Start: 07/01/25 0900 End: 07/04/25 1311                atorvastatin (LIPITOR) tablet 40 mg  Dose: 40 mg  Freq: Daily Route: PO  Start: 07/16/25 0900 0822        atorvastatin (LIPITOR) tablet 40 mg  Dose: 40 mg  Freq: Daily with dinner Route: PO  Start: 06/30/25 1845 End: 07/04/25 1311                bacitracin topical ointment 1 large  application  Dose: 1 large application  Freq: Once Route: TP  Start: 07/13/25 1600 End: 07/13/25 1633   Admin Instructions:      Order specific questions:             1633           bacitracin topical ointment 1 small application  Dose: 1 small application  Freq: Once Route: TP  Start: 07/13/25 0045 End: 07/13/25 0055   Admin Instructions:      Order specific questions:             0055           cefTRIAXone (ROCEPHIN) IVPB (premix in dextrose) 1,000 mg 50 mL  Dose: 1,000 mg  Freq: Every 24 hours Route: IV  Last Dose: Stopped (07/04/25 0700)  Start: 07/01/25 1800 End: 07/04/25 0755   Admin Instructions:      Order specific questions:                   cefTRIAXone (ROCEPHIN) IVPB (premix in dextrose) 1,000 mg 50 mL  Dose: 1,000 mg  Freq: Once Route: IV  Start: 06/30/25 1730 End: 07/04/25 0755   Admin Instructions:      Order specific questions:                   cephalexin (KEFLEX) capsule 500 mg  Dose: 500 mg  Freq: Every 6 hours scheduled Route: PO  Start: 07/16/25 0000 End: 07/20/25 2359   Admin Instructions:      Order specific questions:               (2350) [C]      5075 9789     1800        cephalexin (KEFLEX) capsule 500 mg  Dose: 500 mg  Freq: Once Route: PO  Start: 07/15/25 1815 End: 07/15/25 1946   Admin Instructions:      Order specific questions:               1946-D/C'd  (1953) [C]         cephalexin (KEFLEX) capsule 500 mg  Dose: 500 mg  Freq: Once Route: PO  Start: 07/13/25 2245 End: 07/13/25 2251   Admin Instructions:      Order specific questions:             2251           cephalexin (KEFLEX) capsule 500 mg  Dose: 500 mg  Freq: Once Route: PO  Start: 07/13/25 1645 End: 07/13/25 1718   Admin Instructions:      Order specific questions:             1718           chlordiazePOXIDE (LIBRIUM) capsule 50 mg  Dose: 50 mg  Freq: Once Route: PO  Start: 07/15/25 0845 End: 07/15/25 0849   Admin Instructions:               0849         cloNIDine (CATAPRES) tablet 0.2 mg  Dose: 0.2 mg  Freq: Every 8 hours  scheduled Route: PO  Start: 07/15/25 2200   Admin Instructions:      Order specific questions:               (0168) [C]      0505     1400     2200        dextrose 5 % and sodium chloride 0.9 % bolus 1,000 mL  Dose: 1,000 mL  Freq: Once Route: IV  Last Dose: Stopped (07/12/25 2354)  Start: 07/12/25 2015 End: 07/12/25 2354 2019 2354            diltiazem (CARDIZEM SR) 12 hr capsule 120 mg  Dose: 120 mg  Freq: 2 times daily Route: PO  Start: 07/15/25 2000   Admin Instructions:      Order specific questions:               (4967) [id number]        diltiazem (CARDIZEM SR) 12 hr capsule 120 mg  Dose: 120 mg  Freq: 2 times daily Route: PO  Start: 06/30/25 1845 End: 07/04/25 1311   Admin Instructions:      Order specific questions:                   diltiazem (CARDIZEM) injection 15 mg  Dose: 15 mg  Freq: Once Route: IV  Start: 07/16/25 0300 End: 07/16/25 0258   Admin Instructions:                0258        ferrous sulfate tablet 325 mg  Dose: 325 mg  Freq: Daily with breakfast Route: PO  Start: 07/16/25 0730   Admin Instructions:                0822        ferrous sulfate tablet 325 mg  Dose: 325 mg  Freq: Daily with breakfast Route: PO  Start: 07/01/25 0730 End: 07/04/25 1311   Admin Instructions:                   fluticasone-vilanterol 200-25 mcg/actuation 1 puff  Dose: 1 puff  Freq: Daily Route: IN  Start: 07/16/25 0900   Admin Instructions:                (0830)        fluticasone-vilanterol 200-25 mcg/actuation 1 puff  Dose: 1 puff  Freq: Daily (RESP) Route: IN  Start: 07/01/25 0800 End: 07/04/25 1311   Admin Instructions:                   folic acid (FOLVITE) tablet 1 mg  Dose: 1 mg  Freq: Daily Route: PO  Start: 07/01/25 0900 End: 07/04/25 1311                folic acid (FOLVITE) tablet 400 mcg  Dose: 400 mcg  Freq: Daily Route: PO  Start: 07/16/25 0900 0822        folic acid 1 mg in sodium chloride 0.9 % 50 mL IVPB  Dose: 1 mg  Freq: Once Route: IV  Last Dose: Stopped (07/13/25  0004)  Start: 07/12/25 2045 End: 07/13/25 0004         2354      0004           gabapentin (NEURONTIN) capsule 300 mg  Dose: 300 mg  Freq: 3 times daily Route: PO  Start: 07/15/25 2100   Admin Instructions:               (2108) [C]      0822     1600     2100        gabapentin (NEURONTIN) capsule 300 mg  Dose: 300 mg  Freq: 3 times daily Route: PO  Start: 06/30/25 2100 End: 07/04/25 1311   Admin Instructions:                   ipratropium-albuterol (DUO-NEB) 0.5-2.5 mg/3 mL inhalation solution 3 mL  Dose: 3 mL  Freq: Once Route: NEBULIZATION  Start: 07/13/25 2300 End: 07/13/25 2317 2317           ketorolac (TORADOL) injection 15 mg  Dose: 15 mg  Freq: Once Route: IM  Start: 07/15/25 0845 End: 07/15/25 0849   Admin Instructions:               0849         lactated ringers bolus 1,000 mL  Dose: 1,000 mL  Freq: Once Route: IV  Last Dose: Stopped (07/15/25 1925)  Start: 07/15/25 1715 End: 07/15/25 1925            1746     1925         LORazepam (ATIVAN) injection 1 mg  Dose: 1 mg  Freq: Once Route: IV  Start: 07/15/25 2115 End: 07/15/25 2117   Admin Instructions:               2117         LORazepam (ATIVAN) injection 2 mg  Dose: 2 mg  Freq: Once Route: IV  Start: 07/16/25 0845 End: 07/16/25 0845   Admin Instructions:                0845        LORazepam (ATIVAN) injection 2 mg  Dose: 2 mg  Freq: Once Route: IV  Start: 07/16/25 0300 End: 07/16/25 0258   Admin Instructions:                0258        LORazepam (ATIVAN) tablet 2 mg  Dose: 2 mg  Freq: Once Route: PO  Indications of Use: ALCOHOL WITHDRAWAL SYNDROME  Start: 07/03/25 2315 End: 07/03/25 2327   Admin Instructions:                   LORazepam (ATIVAN) tablet 2 mg  Dose: 2 mg  Freq: Once Route: PO  Indications of Use: ALCOHOL WITHDRAWAL SYNDROME  Start: 07/03/25 1345 End: 07/03/25 1349   Admin Instructions:                   magnesium sulfate 2 g/50 mL IVPB (premix) 2 g  Dose: 2 g  Freq: Once Route: IV  Start: 07/15/25 1815 End: 07/15/25 1950   Admin  Instructions:               1950-D/C'd  (1954) [C]         magnesium sulfate 2 g/50 mL IVPB (premix) 2 g  Dose: 2 g  Freq: Once Route: IV  Last Dose: Stopped (07/04/25 0700)  Start: 07/03/25 0715 End: 07/04/25 0700   Admin Instructions:                   magnesium sulfate 4 g/100 mL IVPB (premix) 4 g  Dose: 4 g  Freq: Once Route: IV  Last Dose: 4 g (07/15/25 2015)  Start: 07/15/25 2000 End: 07/16/25 0015   Admin Instructions:               2015         metoprolol succinate (TOPROL-XL) 24 hr tablet 100 mg  Dose: 100 mg  Freq: Daily Route: PO  Start: 07/16/25 0900   Admin Instructions:      Order specific questions:                0822        metoprolol succinate (TOPROL-XL) 24 hr tablet 100 mg  Dose: 100 mg  Freq: Daily Route: PO  Start: 07/16/25 0900 End: 07/15/25 1946   Admin Instructions:      Order specific questions:               1946-D/C'd       metoprolol succinate (TOPROL-XL) 24 hr tablet 100 mg  Dose: 100 mg  Freq: Daily Route: PO  Start: 07/01/25 0900 End: 07/04/25 1311   Admin Instructions:      Order specific questions:                   mirtazapine (REMERON) tablet 15 mg  Dose: 15 mg  Freq: Daily at bedtime Route: PO  Start: 07/15/25 2200 End: 07/15/25 1431            1431-D/C'd       multi-electrolyte (Plasmalyte-A/Isolyte-S PH 7.4/Normosol-R) IV bolus 1,000 mL  Dose: 1,000 mL  Freq: Once Route: IV  Start: 07/12/25 2130 End: 07/13/25 1158          (0010) [C]     1158-D/C'd         multivitamin stress formula tablet 1 tablet  Dose: 1 tablet  Freq: Daily Route: PO  Start: 07/01/25 0900 End: 07/04/25 1311   Admin Instructions:                   nicotine (NICODERM CQ) 21 mg/24 hr TD 24 hr patch 1 patch  Dose: 1 patch  Freq: Daily Route: TD  Start: 07/16/25 0900   Admin Instructions:                0827        pantoprazole (PROTONIX) EC tablet 40 mg  Dose: 40 mg  Freq: Daily Route: PO  Start: 07/16/25 0900   Admin Instructions:                0822        pantoprazole (PROTONIX) EC tablet 40 mg  Dose: 40  mg  Freq: Daily Route: PO  Start: 07/01/25 0900 End: 07/04/25 1311   Admin Instructions:                   ranolazine (RANEXA) 12 hr tablet 500 mg  Dose: 500 mg  Freq: 2 times daily Route: PO  Start: 07/15/25 2000   Admin Instructions:               (2109) [C]      0822     1800        ranolazine (RANEXA) 12 hr tablet 500 mg  Dose: 500 mg  Freq: 2 times daily Route: PO  Start: 06/30/25 1845 End: 07/04/25 1311   Admin Instructions:                   saccharomyces boulardii (FLORASTOR) capsule 250 mg  Dose: 250 mg  Freq: 2 times daily Route: PO  Start: 07/15/25 2000            (2109) [C]      0822     1800        saccharomyces boulardii (FLORASTOR) capsule 250 mg  Dose: 250 mg  Freq: 2 times daily Route: PO  Start: 06/30/25 1845 End: 07/04/25 1311                tamsulosin (FLOMAX) capsule 0.4 mg  Dose: 0.4 mg  Freq: Daily with dinner Route: PO  Start: 07/16/25 1630   Admin Instructions:                1630        tamsulosin (FLOMAX) capsule 0.4 mg  Dose: 0.4 mg  Freq: Daily with dinner Route: PO  Start: 06/30/25 1845 End: 07/04/25 1311   Admin Instructions:                   thiamine (VITAMIN B1) 100 mg in dextrose 5 % 100 mL IVPB  Dose: 100 mg  Freq: Once Route: IV  Last Dose: Stopped (07/15/25 2005)  Start: 07/15/25 1830 End: 07/15/25 2005            1859     2005         thiamine (VITAMIN B1) 100 mg in dextrose 5 % 100 mL IVPB  Dose: 100 mg  Freq: Once Route: IV  Last Dose: Stopped (07/12/25 2119)  Start: 07/12/25 2045 End: 07/12/25 2119 2049 2119            thiamine tablet 100 mg  Dose: 100 mg  Freq: Daily Route: PO  Start: 07/16/25 0900             0823        thiamine tablet 100 mg  Dose: 100 mg  Freq: Daily Route: PO  Start: 07/01/25 0900 End: 07/04/25 1311                tobramycin-dexamethasone (TOBRADEX) 0.3-0.1 % ophthalmic suspension 1 drop  Dose: 1 drop  Freq: Every 6 hours scheduled Route: Both Eyes  Start: 07/02/25 1200 End: 07/04/25 1311   Admin Instructions:                   torsemide  (DEMADEX) tablet 10 mg  Dose: 10 mg  Freq: Every other day Route: PO  Start: 07/15/25 2000   Order specific questions:               (2109) [C]         vancomycin (VANCOCIN) capsule 125 mg  Dose: 125 mg  Freq: Every 12 hours scheduled Route: PO  Indications Comment: Patient currently on antibiotics, vancomycin prophylactic while on antibiotics  Start: 07/15/25 2100   Admin Instructions:      Order specific questions:               (2109) [C]      0822 2100        vancomycin (VANCOCIN) oral solution 125 mg  Dose: 125 mg  Freq: Every 12 hours scheduled Route: PO  Start: 06/30/25 2100 End: 07/04/25 1311   Admin Instructions:      Order specific questions:                               Continuous Meds Sorted by Name  for Gregg Partida as of 07/07/25 through 7/16/25  Legend:       Medications 07/07 07/08 07/09 07/10 07/11 07/12 07/13 07/14 07/15 07/16               PRN Meds Sorted by Name  for Gregg Partida as of 07/07/25 through 7/16/25  Legend:       Medications 07/07 07/08 07/09 07/10 07/11 07/12 07/13 07/14 07/15 07/16   acetaminophen (TYLENOL) tablet 650 mg  Dose: 650 mg  Freq: Every 6 hours PRN Route: PO  PRN Reasons: mild pain,headaches,fever  Indications of Use: FEVER,HEADACHE,MILD PAIN  Start: 07/15/25 1946                acetaminophen (TYLENOL) tablet 650 mg  Dose: 650 mg  Freq: Every 6 hours PRN Route: PO  PRN Reasons: mild pain,headaches,fever  Start: 06/30/25 1757 End: 07/04/25 1311                albuterol (PROVENTIL HFA,VENTOLIN HFA) inhaler 2 puff  Dose: 2 puff  Freq: Every 6 hours PRN Route: IN  PRN Reasons: wheezing,shortness of breath  Start: 07/15/25 1946   Admin Instructions:                   dextromethorphan-guaiFENesin (ROBITUSSIN DM) oral syrup 10 mL  Dose: 10 mL  Freq: Every 4 hours PRN Route: PO  PRN Reasons: cough,congestion  Start: 06/30/25 1752 End: 07/04/25 1311                hydrALAZINE (APRESOLINE) injection 5 mg  Dose: 5 mg  Freq: Every 6 hours PRN Route: IV  PRN Reason: high  blood pressure  PRN Comment: sbp > 160 or dbp > 100  Start: 06/30/25 1757 End: 07/04/25 1311   Admin Instructions:      Order specific questions:                   hydrOXYzine HCL (ATARAX) tablet 25 mg  Dose: 25 mg  Freq: Every 6 hours PRN Route: PO  PRN Reason: anxiety  Start: 07/15/25 1946   Admin Instructions:                    levalbuterol (XOPENEX) inhalation solution 1.25 mg  Dose: 1.25 mg  Freq: Every 8 hours PRN Route: NEBULIZATION  PRN Reasons: wheezing,shortness of breath  Start: 06/30/25 1759 End: 07/04/25 1311                And   sodium chloride 0.9 % inhalation solution 3 mL  Dose: 3 mL  Freq: Every 6 hours PRN Route: NEBULIZATION  PRN Reasons: wheezing,shortness of breath  Start: 06/30/25 1758 End: 06/30/25 1810                metoprolol (LOPRESSOR) injection 5 mg  Dose: 5 mg  Freq: Every 6 hours PRN Route: IV  PRN Reason: high blood pressure  PRN Comment: HR >120  Indications of Use: ATRIAL FIBRILLATION  Start: 07/15/25 2106   Admin Instructions:               2118         ondansetron (ZOFRAN) injection 4 mg  Dose: 4 mg  Freq: Every 6 hours PRN Route: IV  PRN Reasons: nausea,vomiting  Start: 07/15/25 1946   Admin Instructions:                   trimethobenzamide (TIGAN) IM injection 200 mg  Dose: 200 mg  Freq: Every 6 hours PRN Route: IM  PRN Reasons: nausea,vomiting  Start: 06/30/25 1757 End: 07/04/25 1311   Admin Instructions:                   Legend:       [id number]            ED Triage Vitals [07/15/25 1710]   Temperature Pulse Respirations Blood Pressure SpO2 Pain Score   97.9 °F (36.6 °C) 97 20 106/58 91 % 8     Weight (last 2 days) before discharge       Date/Time Weight    07/15/25 2045 72.8 (160.5)            Vital Signs (last 3 days) before discharge       Date/Time Temp Pulse Resp BP MAP (mmHg) SpO2 Calculated FIO2 (%) - Nasal Cannula Nasal Cannula O2 Flow Rate (L/min) O2 Device Patient Position - Orthostatic VS [place] Coma Scale Score  CIWA-Ar Total Pain    07/16/25 1011 99.1 °F (37.3 °C) 110 18 136/84 105 98 % 32 3 L/min Nasal cannula Lying 13 6 3    07/16/25 0837 -- -- -- -- -- -- -- -- -- -- -- 12 --    07/16/25 0757 97.7 °F (36.5 °C) 102 18 148/78 108 96 % 32 3 L/min Nasal cannula Lying -- 12 --    07/16/25 0503 -- 107 18 138/83 107 97 % 32 3 L/min Nasal cannula Sitting -- -- --    07/16/25 0440 98 °F (36.7 °C) 107 18 138/83 107 97 % -- -- -- -- -- -- --    07/16/25 0339 98 °F (36.7 °C) 97 18 122/83 98 97 % -- -- -- -- -- 6 --    07/16/25 0338 98 °F (36.7 °C) 97 18 122/83 98 97 % -- -- -- -- -- -- --    07/16/25 0336 98 °F (36.7 °C) 97 18 122/83 98 97 % -- -- Nasal cannula Lying -- -- --    07/16/25 0300 -- -- -- -- -- 90 % 32 3 L/min Nasal cannula -- -- -- --    07/16/25 0241 98 °F (36.7 °C) 123 16 170/114 136 95 % -- -- Nasal cannula Lying -- 14 --    07/15/25 2321 98.2 °F (36.8 °C) 116 17 159/102 110 95 % 28 2 L/min Nasal cannula Lying -- 6 --    07/15/25 2147 -- -- -- -- -- -- -- -- None (Room air) -- 13 -- 3    07/15/25 2146 -- 99 -- 141/86 -- -- -- -- -- -- -- -- --    07/15/25 2100 -- 137 -- -- -- -- -- -- -- -- -- 6 --    07/15/25 2045 97.7 °F (36.5 °C) 116 19 166/110 135 93 % -- -- None (Room air) Lying -- -- --    07/15/25 2009 -- 88 -- 132/79 -- -- -- -- -- -- -- 0 --    07/15/25 2006 -- 88 18 132/79 99 92 % -- -- None (Room air) Lying -- -- --    07/15/25 2000 -- -- -- -- -- -- -- -- -- -- 13 -- --    07/15/25 1954 -- -- -- -- -- -- -- -- None (Room air) -- -- -- --    07/15/25 1947 -- -- -- -- -- -- -- -- None (Room air) -- -- -- --    07/15/25 1710 97.9 °F (36.6 °C) 97 20 106/58 72 91 % -- -- None (Room air) Lying -- -- 8           CIWA-Ar Score       Row Name 07/16/25 1011 07/16/25 0837 07/16/25 0757       CIWA-Ar    Nausea and Vomiting 0 1 1    Tactile Disturbances 0 0 0    Tremor 2 3 3    Auditory Disturbances 0 0 0    Paroxysmal Sweats 0 0 0    Visual Disturbances 0 0 0    Anxiety 1 1 1    Headache, Fullness in Head 0 3 3     Agitation 0 1 1    Orientation and Clouding of Sensorium 3 3 3    CIWA-Ar Total 6 12 12      Row Name 07/16/25 0339 07/16/25 0241 07/15/25 2321       CIWA-Ar    Nausea and Vomiting 0 0 0    Tactile Disturbances 0 1 0    Tremor 2 3 2    Auditory Disturbances 0 1 0    Paroxysmal Sweats 0 0 0    Visual Disturbances 0 0 0    Anxiety 1 3 1    Headache, Fullness in Head 0 0 0    Agitation 0 3 0    Orientation and Clouding of Sensorium 3 3 3    CIWA-Ar Total 6 14 6      Row Name 07/15/25 2100 07/15/25 2009          CIWA-Ar    BP -- 132/79     Pulse -- 88     Nausea and Vomiting 0 0     Tactile Disturbances 0 0     Tremor 0 0     Auditory Disturbances 0 0     Paroxysmal Sweats 0 0     Visual Disturbances 0 0     Anxiety 2 0     Headache, Fullness in Head 0 0     Agitation 1 0     Orientation and Clouding of Sensorium 3 0     CIWA-Ar Total 6 0                     Pertinent Labs/Diagnostic Test Results:   Radiology:  CT head without contrast   Final Interpretation by Evert Loza MD (07/15 1816)      No acute intracranial abnormality.  Stable chronic microangiopathic changes within the brain.                  Workstation performed: [id number]           Cardiology:  No orders to display     GI:  No orders to display           Results from last 7 days   Lab Units 07/16/25  0459 07/15/25  1739   WBC Thousand/uL 3.18* 3.73*   HEMOGLOBIN g/dL 10.1* 10.0*   HEMATOCRIT % 31.6* 31.7*   PLATELETS Thousands/uL 105* 126*   TOTAL NEUT ABS Thousands/µL  --  1.96         Results from last 7 days   Lab Units 07/16/25  0459 07/15/25  1739   SODIUM mmol/L 142 142   POTASSIUM mmol/L 3.9 3.9   CHLORIDE mmol/L 105 105   CO2 mmol/L 27 25   ANION GAP mmol/L 10 12   BUN mg/dL 14 15   CREATININE mg/dL 0.84 1.07   EGFR ml/min/1.73sq m 93 73   CALCIUM mg/dL 7.6* 7.5*   MAGNESIUM mg/dL 2.0 1.1*     Results from last 7 days   Lab Units 07/15/25  1739   AST U/L 74*   ALT U/L 26   ALK PHOS U/L 130*   TOTAL PROTEIN g/dL 6.8   ALBUMIN g/dL 3.3*    TOTAL BILIRUBIN mg/dL 0.48         Results from last 7 days   Lab Units 07/16/25  0459 07/15/25  1739   GLUCOSE RANDOM mg/dL 75 83             BETA-HYDROXYBUTYRATE   Date Value Ref Range Status   01/22/2024 3.2 (H) <0.6 mmol/L Final                                                                          Results from last 7 days   Lab Units 07/15/25  1739   LIPASE u/L 46                 Results from last 7 days   Lab Units 07/16/25  0134   CLARITY UA  Clear   COLOR UA  Yellow   SPEC GRAV UA  1.020   PH UA  5.5   GLUCOSE UA mg/dl Negative   KETONES UA mg/dl Negative   BLOOD UA  Negative   PROTEIN UA mg/dl 30 (1+)*   NITRITE UA  Negative   BILIRUBIN UA  Negative   UROBILINOGEN UA (BE) mg/dl <2.0   LEUKOCYTES UA  Negative   WBC UA /hpf 0-1   RBC UA /hpf None Seen   BACTERIA UA /hpf Occasional   EPITHELIAL CELLS WET PREP /hpf Occasional             Results from last 7 days   Lab Units 07/16/25  0134   AMPH/METH  Negative   BARBITURATE UR  Negative   BENZODIAZEPINE UR  Positive*   COCAINE UR  Negative   METHADONE URINE  Negative   OPIATE UR  Negative   PCP UR  Negative   THC UR  Negative     Results from last 7 days   Lab Units 07/15/25  1739   ETHANOL LVL mg/dL 391*                                   Past Medical History[1]  Present on Admission:   Alcohol intoxication (HCC)   Atrial fibrillation with rapid ventricular response (HCC)   CAD (coronary artery disease)   Chronic heart failure with preserved ejection fraction (HCC)   Closed fracture of one rib of left side   COPD (chronic obstructive pulmonary disease) (HCC)   Dyslipidemia   Fall   History of Clostridium difficile infection   Hypothyroidism   Nicotine abuse   SIRS (systemic inflammatory response syndrome) (HCC)   Pancytopenia (HCC)      Admitting Diagnosis: Hypomagnesemia [E83.42]  Alcohol intoxication (HCC) [F10.929]  Multiple abrasions [T07.XXXA]  Age/Sex: 65 y.o. male    Network Utilization Review Department  ATTENTION: Please call with any questions or  concerns to [phone] and carefully listen to the prompts so that you are directed to the right person. All voicemails are confidential.   For Discharge needs, contact Care Management DC Support Team at [phone] opt. 2  Send all requests for admission clinical reviews, approved or denied determinations and any other requests to dedicated fax number below belonging to the [place] where the patient is receiving treatment. List of dedicated fax numbers for the Facilities:  FACILITY NAME UR FAX NUMBER   ADMISSION DENIALS (Administrative/Medical Necessity) [phone]   DISCHARGE SUPPORT TEAM (NETWORK) [phone]   PARENT CHILD HEALTH (Maternity/NICU/Pediatrics) [phone]   [facility] [phone]   [facility] [phone]   [facility] [phone]   [facility] [phone]   [facility] [phone]   [facility] [phone]   [facility] [phone]   [facility] [phone]   [facility] [phone]   [facility] [phone]   [facility] [phone]   [facility] [phone]              [1]   Past Medical History:  Diagnosis Date    Acute encephalopathy 06/07/2025    Acute on chronic kidney failure  (HCC)     Alcohol abuse     Alcohol withdrawal (HCC) 10/09/2018    Atrial fibrillation (HCC)     Cancer (HCC)     prostate ca,had radiation    Cardiac disease     stents,then triple bypass    COPD (chronic obstructive pulmonary disease) (HCC)     Coronary artery disease     Elevated troponin 09/28/2023    ETOH abuse     Heart failure (HCC)     History of heart surgery     says triple bypass [facility]    Hx of heart artery stent     2014    Hyperlipidemia      Hypertension     Hypoglycemia 06/05/2025    Hyponatremia 10/17/2019    Hypovolemic shock ([facility]) 12/22/2019    Increased anion gap 04/21/2021    Lumbar spondylitis ([facility]) 10/13/2022    Metabolic acidosis, increased anion gap 04/21/2021    Nasal bone fracture 10/10/2022    Prostate CA ([facility])     S/P CABG x 3     2004    Sleep apnea

## 2025-07-16 NOTE — ASSESSMENT & PLAN NOTE
SIRS, POA, tachycardia, and leukopenia.  Patient has chronic leukopenia at baseline, tachycardia from A fib with RVR, so at this time no suspected source of infection.  No clear signs or symptoms of active infection while hospitalized

## 2025-07-16 NOTE — WOUND OSTOMY CARE
"Progress Note - Wound   Gregg Partida 65 y.o. male MRN: [id number]  Unit/Bed#: [address] [place] [address] Encounter: [id number]        Assessment:   This is a 65 year old male patient admitted on 7/15/25 with alcohol intoxication. Wound consult received for \"multiple skin tears and wounds to upper and lower extremities\" present on admission. Patient was extremely lethargic and mostly non-verbal. He agreed to have wound visit done and told  that he would like to be discharged to his home today.    Assessment Findings:  1-Full thickness traumatic wound cluster to left posterior lower arm with yellow slough and dried brown drainage to periwound. Orders are in place for wound care. Patient stated that his neighbor can help with wound care. He refused ambulatory referral to Wound Care.  2-Full thickness wound cluster to left anterior knee with dry black eschar and no drainage due to wound left uncovered. Orders are in place for wound care.  3-Intact blood blister to left 3rd finger with no drainage. Orders are in place for wound care.  4-Bilateral heels and sacrobuttocks are intact - orders are in place for skin care and for prevention.    Wound Care Plan:  1-Cleanse wound to left leg and left posterior arm with NSS or VASHE soaked gauze and pat dry. Apply Betadine to open areas, ABD, rolled gauze and tape. Change every other day & prn soilage/dislodgement.  2-Cleanse wound to left 3rd finger with NSS or VASHE soaked gauze and pat dry. Apply Dermagran cut to size of wound, gauze, rolled gauze and tape. Change every other day & prn soilage/dislodgement.  3-Cleanse wound to right medial forearm with NSS or VASHE soaked gauze and pat dry. Leave steristrips in place - if they fall off, apply 3M No Sting to periwound, gauze or ABD, rolled gauze and tape. Change every other day & prn soilage/dislodgement.  4-Apply Prevent barrier cream to bilateral sacrum, buttock and heels BID and PRN  5-Float heels on 2 pillows to offload " pressure so heels are not in contact with mattress or pillows.  6-Ehob pressure redistribution cushion in chair when out of bed if able. Avoid prolonged sitting.  7-Moisturize skin daily with skin nourishing cream.  8-Turn/reposition q2h or when medically stable for pressure re-distribution on skin.       Wound 07/16/25 Traumatic Arm Left;Posterior (Active)   Wound Image   07/16/25 1000   Wound Description Slough;Yellow 07/16/25 1000   Non-staged Wound Description Full thickness 07/16/25 1000   Wound Length (cm) 15 cm 07/16/25 1000   Wound Width (cm) 5 cm 07/16/25 1000   Wound Depth (cm) 0.1 cm 07/16/25 1000   Wound Surface Area (cm^2) 58.9 cm^2 07/16/25 1000   Wound Volume (cm^3) 3.927 cm^3 07/16/25 1000   Calculated Wound Volume (cm^3) 7.5 cm^3 07/16/25 1000   Drainage Amount None 07/16/25 1000   Babita-wound Assessment Old;Dry;Brown drainage 07/16/25 1000   Treatments Cleansed 07/16/25 1000   Dressing Betadine;ABD;Dry dressing 07/16/25 1000   Dressing Changed New 07/16/25 1000   Dressing Status Clean;Dry;Intact 07/16/25 1000       Wound 07/16/25 Other (Comment) Finger D3, long Anterior;Left (Active)   Wound Image   07/16/25 1003   Wound Description Intact;Other (Comment) 07/16/25 1003   Non-staged Wound Description Full thickness 07/16/25 1003   Wound Length (cm) 3 cm 07/16/25 1003   Wound Width (cm) 3 cm 07/16/25 1003   Wound Depth (cm) 0 cm 07/16/25 1003   Wound Surface Area (cm^2) 7.07 cm^2 07/16/25 1003   Wound Volume (cm^3) 0 cm^3 07/16/25 1003   Calculated Wound Volume (cm^3) 0 cm^3 07/16/25 1003   Drainage Amount None 07/16/25 1003   Babita-wound Assessment Intact 07/16/25 1003   Treatments Cleansed 07/16/25 1003   Dressing Dermagran gauze;Gauze;Dry dressing 07/16/25 1003   Dressing Changed New 07/16/25 1003   Dressing Status Clean;Dry;Intact 07/16/25 1003       Wound 07/16/25 Traumatic Knee Anterior;Left (Active)   Wound Image   07/16/25 1020   Wound Description Dry;Black;Eschar 07/16/25 1020   Non-staged  Wound Description Full thickness 07/16/25 1020   Wound Length (cm) 12.5 cm 07/16/25 1020   Wound Width (cm) 7.2 cm 07/16/25 1020   Wound Depth (cm) 0.1 cm 07/16/25 1020   Wound Surface Area (cm^2) 70.69 cm^2 07/16/25 1020   Wound Volume (cm^3) 4.712 cm^3 07/16/25 1020   Calculated Wound Volume (cm^3) 9 cm^3 07/16/25 1020   Drainage Amount None 07/16/25 1020   Babita-wound Assessment Intact;Erythema 07/16/25 1020   Treatments Cleansed 07/16/25 1020   Dressing Betadine;ABD;Dry dressing 07/16/25 1020   Dressing Changed New 07/16/25 1020   Dressing Status Clean;Dry;Intact 07/16/25 1020       Wound 07/16/25 Traumatic Arm Right;Lower;Medial (Active)    Wound Description Pink;Non-granulation tissue;Bleeding 07/16/25 1012   Non-staged Wound Description Partial thickness 07/16/25 1012   Wound Length (cm) 1 cm 07/16/25 1012   Wound Width (cm) 1 cm 07/16/25 1012   Wound Depth (cm) 0.1 cm 07/16/25 1012   Wound Surface Area (cm^2) 0.79 cm^2 07/16/25 1012   Wound Volume (cm^3) 0.052 cm^3 07/16/25 1012   Calculated Wound Volume (cm^3) 0.1 cm^3 07/16/25 1012   Drainage Amount Scant 07/16/25 1012   Drainage Description Sanguineous 07/16/25 1012   Babita-wound Assessment Purple;Fragile 07/16/25 1012   Wound Site Closure Adhesive closure strips 07/16/25 1012   Dressing ABD;Dry dressing 07/16/25 1012   Dressing Changed New 07/16/25 1012   Dressing Status Clean;Dry;Intact 07/16/25 1012     Discussed assessment findings, and plan of care/recommendations with Dottie SORIANO.    Patient is for possible discharge today, please call or text with questions and concerns.    Recommendations written as orders.  Iraida Galvan MSN, RN, CWON

## 2025-07-16 NOTE — ASSESSMENT & PLAN NOTE
Seen by psychiatry during ED visit from the morning, was deemed to have medical decision-making capacity.  Chart reviewed 7 ED visits in July, 13 ED visits in June, admission in June for A-fib with RVR, fall, medication noncompliance, and alcohol withdrawal.  Most recent admission 6/30-7/4 for acute respiratory failure with hypoxia, A-fib with RVR due to noncompliance with home medications and SIRS.

## 2025-07-16 NOTE — CASE MANAGEMENT
Case Management Assessment & Discharge Planning Note    Patient name Gregg Partida  Location [address] [address]/[address] [address]-* MRN [id number]  : 1960 Date 2025       Current Admission Date: 7/15/2025  Current Admission Diagnosis:Alcohol intoxication (HCC)   Patient Active Problem List    Diagnosis Date Noted    Wounds, multiple 07/15/2025    Housing instability 07/15/2025    Hypomagnesemia 07/15/2025    Generalized weakness 2025    Suspected aspiration episode 2025    Alcohol intoxication (HCC) 2025    History of Clostridium difficile infection 2025    Sacral wound, initial encounter 2025    Opacity of lung on imaging study 2025    QT prolongation 2025    Abnormal CT scan, cervical spine 2025    Abnormal CXR 2025    Abnormal EKG 2025    Essential hypertension 2025    Pancytopenia (HCC) 2025    CKD (chronic kidney disease) stage 2, GFR 60-89 ml/min 2025    Heart failure with preserved ejection fraction (HCC) 2025    Fracture of multiple ribs 2025    Wound of right buttock 2025    Dyspnea on exertion 2025    Head injury 2024    Hypotension 2024    Mucus plugging of bronchi 2024    Acute respiratory failure with hypoxia (HCC) 2024    Hyponatremia 2024    Closed fracture of one rib of left side 2024    Financial insecurity 2024    BPH (benign prostatic hyperplasia) 2024    Chronic alcohol use 2024    Alcoholic intoxication without complication (HCC) 05/10/2024    Hypertension 2024    Hemorrhagic cystitis 2024    Alcohol abuse 2024    Hypothyroidism 2024    Gastrointestinal hemorrhage with melena 10/29/2023    Chest pain 2023    Longstanding persistent atrial fibrillation (HCC) 2023    GERD (gastroesophageal reflux disease) 2023    Stable angina (HCC) 2022    Stage 3a chronic kidney disease (HCC) 2022     Fatty liver, alcoholic 04/15/2022    Nonischemic nontraumatic myocardial injury 04/10/2022    Atrial fibrillation with RVR ([facility]) 10/25/2021    Mild protein-calorie malnutrition ([facility]) 05/13/2021    Metabolic acidosis 04/21/2021    Transaminitis 04/21/2021    Atrial fibrillation with rapid ventricular response ([facility]) 12/05/2020    Chronic heart failure with preserved ejection fraction ([facility]) 12/22/2019    Noncompliance 12/22/2019    SIRS (systemic inflammatory response syndrome) ([facility]) 11/23/2019    Alcohol use disorder 11/23/2019    Cervical spinal stenosis 10/17/2019    Ambulatory dysfunction 10/17/2019    Electrolyte abnormality 10/17/2019    Thrombocytopenia ([facility]) 06/09/2019    History of prostate cancer 06/07/2019    CAD (coronary artery disease) 06/07/2019    Elevated alkaline phosphatase 06/07/2019    Nicotine abuse 06/07/2019    Fall 06/07/2019    Depression, recurrent ([facility]) 10/10/2018    Hx of CABG 10/10/2018    Dyslipidemia 10/10/2018    Hypertensive heart and chronic kidney disease with heart failure and stage 1 through stage 4 chronic kidney disease, or chronic kidney disease ([facility]) 10/09/2018    Type 2 diabetes mellitus, without long-term current use of insulin ([facility]) 10/09/2018    COPD (chronic obstructive pulmonary disease) ([facility]) 10/09/2018      LOS (days): 1  Geometric Mean LOS (GMLOS) (days):   Days to GMLOS:     OBJECTIVE:  PATIENT READMITTED TO HOSPITAL  Risk of Unplanned Readmission Score: 95.92      Current admission status: Inpatient  Referral Reason: Drug/Alcohol Abuse    Preferred Pharmacy:   [facility] [address] [address]  Phone: [phone] Fax: [phone]    UNKNOWN - FOLLOW UP PRIOR TO DISCHARGE TO E-PRESCRIBE  No address on file      Primary Care Provider: Lilliana Bliss MD    Primary Insurance: ABHI  REP  Secondary Insurance:     ASSESSMENT:  Active Health Care Proxies       Gregg Partida [facility]  Representative - Son   Primary Phone: [phone] (Mobile)            Readmission Root Cause  30 Day Readmission: Yes  During your hospital stay, did someone (provider, nurse, ) explain your care to you in a way you could understand?: Unable to Assess  Did you feel medically stable to leave the hospital?: Unable to Assess  Were you able to pay for your medication at the pharmacy?: Unable to Assess  Did you have reliable transportation to take you to your appointments?: Unable to Assess  During previous admission, was a post-acute recommendation made?: Yes  What post-acute resources were offered?: Other (Offered substance abuse treatment- patient refused)  Patient was readmitted due to: ETOH abuse  Action Plan: Medical management, substance abuse referral    Patient Information  Admitted from:: Home  Mental Status: Alert  During Assessment patient was accompanied by: Not accompanied during assessment  Assessment information provided by:: Patient  Primary Caregiver: Self  County of Residence: [place]  What city do you live in?: [place], [place]  Home entry access options. Select all that apply.: Elevator  Type of Current Residence: Apartment  Floor Level: 5  Living Arrangements: Lives Alone    Activities of Daily Living Prior to Admission  Functional Status: Independent  Completes ADLs independently?: Yes  Ambulates independently?: Yes  Does patient use assisted devices?: Yes  Assisted Devices (DME) used: Home Oxygen concentrator, Portable Oxygen tanks, Walker  DME Company Name (respiratory supplies): [employer]  Does patient currently have HHC?: No     Patient Information Continued  Income Source: SSI/SSD  Does patient have prescription coverage?: Yes  Can the patient afford their medications and any related supplies (such as glucometers or test strips)?: Yes  Does patient receive dialysis treatments?: No  Does patient have a history of substance abuse?: Currently using  Current substance use  preference: Alcohol/ETOH  Is patient currently in treatment for substance abuse?: No. Patient declined treatment information.     Means of Transportation  Means of Transport to Appts:: [facility]     DISCHARGE DETAILS:    Discharge planning discussed with:: Patient  Freedom of Choice: Yes  Comments - Freedom of Choice: SW met with patient to discuss discharge plan. Patient stating that he just wants Lyft transport home. SW offered referral to [facility] for assistance with placement in ETOH treatment program. Patient adamently declining assistance stating he just wants to go home. Patient declining WCV or BLS wanting only a Lyft.     Were Treatment Team discharge recommendations reviewed with patient/caregiver?: Yes  Did patient/caregiver verbalize understanding of patient care needs?: Yes  Were patient/caregiver advised of the risks associated with not following Treatment Team discharge recommendations?: Yes     Other Referral/Resources/Interventions Provided:  Interventions: High Utilizer  Referral Comments: Per chart review, patient was reviewed by High Utilizer Committee and found not appropriate for care plan as of 6/5/25.     Treatment Team Recommendation: Substance Abuse Treatment  Expected Discharge Disposition: Home or Self Care     Transport at Discharge : Ride Share

## 2025-07-17 ENCOUNTER — TRANSITIONAL CARE MANAGEMENT (OUTPATIENT)
Age: 65
End: 2025-07-17

## 2025-07-17 ENCOUNTER — HOSPITAL ENCOUNTER (EMERGENCY)
Facility: HOSPITAL | Age: 65
Discharge: HOME | End: 2025-07-17
Attending: EMERGENCY MEDICINE
Payer: COMMERCIAL

## 2025-07-17 VITALS
TEMPERATURE: 98.2 F | SYSTOLIC BLOOD PRESSURE: 105 MMHG | RESPIRATION RATE: 18 BRPM | HEART RATE: 82 BPM | OXYGEN SATURATION: 95 % | DIASTOLIC BLOOD PRESSURE: 65 MMHG

## 2025-07-17 DIAGNOSIS — Z48.00 DRESSING CHANGE: Primary | ICD-10-CM

## 2025-07-17 LAB
QRS AXIS: 107 DEGREES
QRSD INTERVAL: 96 MS
QT INTERVAL: 342 MS
QTC INTERVAL: 469 MS
T WAVE AXIS: -62 DEGREES
VENTRICULAR RATE: 113 BPM

## 2025-07-17 PROCEDURE — 93010 ELECTROCARDIOGRAM REPORT: CPT | Performed by: INTERNAL MEDICINE

## 2025-07-17 PROCEDURE — 99283 EMERGENCY DEPT VISIT LOW MDM: CPT

## 2025-07-17 PROCEDURE — 99283 EMERGENCY DEPT VISIT LOW MDM: CPT | Performed by: EMERGENCY MEDICINE

## 2025-07-17 RX ORDER — CEPHALEXIN 500 MG/1
500 CAPSULE ORAL ONCE
Status: DISCONTINUED | OUTPATIENT
Start: 2025-07-17 | End: 2025-07-17 | Stop reason: HOSPADM

## 2025-07-17 RX ORDER — GINSENG 100 MG
1 CAPSULE ORAL ONCE
Status: COMPLETED | OUTPATIENT
Start: 2025-07-17 | End: 2025-07-17

## 2025-07-17 RX ADMIN — BACITRACIN ZINC 1 LARGE APPLICATION: 500 OINTMENT TOPICAL at 00:44

## 2025-07-17 NOTE — ED NOTES
This RN rewrapped abrasions on left arm as patient requested.      Faith Chavez, CHRISTIE  07/17/25 0052

## 2025-07-17 NOTE — ED PROVIDER NOTES
Time reflects when diagnosis was documented in both MDM as applicable and the Disposition within this note       Time User Action Codes Description Comment    7/17/2025 12:27 AM Mili Patel Add [Z48.00] Dressing change           ED Disposition       ED Disposition   Discharge    Condition   Stable    Date/Time   Thu Jul 17, 2025 12:30 AM    Comment   Gregg Partida discharge to home/self care.                   Assessment & Plan       Medical Decision Making  Pt is a 64yo M who presents with hand wounds.     Patient already prescribed outpatient antibiotic.  Patient has been in the emergency department multiple times in the past week including an inpatient stay.  Will plan for antibiotic ointment and dressing change.  No other treatments or interventions indicated at this time.     Plan to discharge pt with f/u to PCP. Discussed returning the ED with new or worsening of symptoms. Discussed use of over the counter medications as stated on the bottle as needed for pain. Discussed taking previously prescribed antibiotic as prescribed and to completion. Pt expressed understanding of discharge instructions, return precautions, and medication instructions and is stable for discharge at this time. All questions were answered and pt was discharged without incident.         Risk  OTC drugs.  Prescription drug management.        ED Course as of 07/17/25 0302   Thu Jul 17, 2025   0047 Bacitracin applied and dressings changed as pt requested. Pt provided with tube of bacitracin for home dressing changes.    0058 Pt ambulated without difficulty. Will DC.        Medications   cephalexin (KEFLEX) capsule 500 mg (500 mg Oral Not Given 7/17/25 0044)   bacitracin topical ointment 1 large application (1 large application Topical Given 7/17/25 0044)       ED Risk Strat Scores                    No data recorded        SBIRT 22yo+      Flowsheet Row Most Recent Value   Initial Alcohol Screen: US AUDIT-C     1. How often do you  have a drink containing alcohol? 6 Filed at: 07/17/2025 0017   2. How many drinks containing alcohol do you have on a typical day you are drinking?  6 Filed at: 07/17/2025 0017   3a. Male UNDER 65: How often do you have five or more drinks on one occasion? 6 Filed at: 07/17/2025 0017   3b. FEMALE Any Age, or MALE 65+: How often do you have 4 or more drinks on one occassion? 0 Filed at: 07/17/2025 0017   Audit-C Score 18 Filed at: 07/17/2025 0017   Full Alcohol Screen: US AUDIT    4. How often during the last year have you found that you were not able to stop drinking once you had started? 0 Filed at: 07/17/2025 0017   5. How often during past year have you failed to do what was normally expected of you because of drinking?  0 Filed at: 07/17/2025 0017   6. How often in past year have you needed a first drink in the morning to get yourself going after a heavy drinking session?  0 Filed at: 07/17/2025 0017   7. How often in past year have you had feeling of guilt or remorse after drinking?  0 Filed at: 07/17/2025 0017   8. How often in past year have you been unable to remember what happened night before because you had been drinking?  0 Filed at: 07/17/2025 0017   9. Have you or someone else been injured as a result of your drinking?  0 Filed at: 07/17/2025 0017   10. Has a relative, friend, doctor or other health worker been concerned about your drinking and suggested you cut down?  0 Filed at: 07/17/2025 0017   AUDIT Total Score 18 Filed at: 07/17/2025 0017   BRIGIDA: How many times in the past year have you...    Used an illegal drug or used a prescription medication for non-medical reasons? Never Filed at: 07/17/2025 0017                            History of Present Illness       Chief Complaint   Patient presents with    Personal Problem     Arrives via EMS fell a week ago requesting that bandages be redone on hands. Reported he did not change or redoing any dressings on his hands at home        Past Medical  "History[1]   Past Surgical History[2]   Family History[3]   Social History[4]   E-Cigarette/Vaping    E-Cigarette Use Never User       E-Cigarette/Vaping Substances    Nicotine Yes     THC No     CBD No     Flavoring No     Other No     Unknown No       I have reviewed and agree with the history as documented.     Pt is a 64yo M who presents for hemorrhoids.  Patient well-known to this emergency department and has been evaluated for hand wounds previously.  Patient reports he called EMS this evening because he \"wants the wounds to heal\".  Discussed with patient that with good wound care and antibiotic compliance, the wounds will heal in time.  Discussed dressing changes.  Patient states that he can change the dressing at home but does not have any antibiotic ointment.  Upon further questioning, patient states he called EMS because he wanted an ointment and dressing placed on his hands.  Patient has known history of alcohol abuse and states he had 2 drinks this evening.        Objective       ED Triage Vitals [07/17/25 0016]   Temperature Pulse Blood Pressure Respirations SpO2 Patient Position - Orthostatic VS   98.2 °F (36.8 °C) 82 105/65 18 95 % Lying      Temp Source Heart Rate Source BP Location FiO2 (%) Pain Score    Tympanic Monitor Left arm -- --      Vitals      Date and Time Temp Pulse SpO2 Resp BP Pain Score FACES Pain Rating User   07/17/25 0016 98.2 °F (36.8 °C) 82 95 % 18 105/65 -- -- DD            Physical Exam  Vitals reviewed.   Constitutional:       Appearance: He is well-developed. He is not toxic-appearing or diaphoretic.   HENT:      Head: Normocephalic and atraumatic.      Right Ear: External ear normal.      Left Ear: External ear normal.      Nose: Nose normal.      Mouth/Throat:      Pharynx: Oropharynx is clear.     Eyes:      Extraocular Movements: Extraocular movements intact.      Conjunctiva/sclera: Conjunctivae normal.      Pupils: Pupils are equal, round, and reactive to light. "       Cardiovascular:      Rate and Rhythm: Normal rate and regular rhythm.      Heart sounds: Normal heart sounds.   Pulmonary:      Effort: Pulmonary effort is normal.      Breath sounds: Normal breath sounds.   Abdominal:      General: Bowel sounds are normal. There is no distension.      Palpations: Abdomen is soft.      Tenderness: There is no abdominal tenderness.     Musculoskeletal:         General: Normal range of motion.      Cervical back: Normal range of motion and neck supple.     Skin:     General: Skin is warm and dry.      Capillary Refill: Capillary refill takes less than 2 seconds.      Comments: Old appearing wounds on the posterior aspect of bilateral hands, left greater than right     Neurological:      General: No focal deficit present.      Mental Status: He is alert and oriented to person, place, and time.     Psychiatric:         Speech: Speech normal.         Behavior: Behavior is cooperative.         Results Reviewed       None            No orders to display       Procedures    ED Medication and Procedure Management   Prior to Admission Medications   Prescriptions Last Dose Informant Patient Reported? Taking?   Blood Glucose Monitoring Suppl (ONE TOUCH ULTRA MINI) w/Device KIT  Self Yes No   Sig: Use as directed   Patient not taking: Reported on 6/5/2025   Blood Pressure Monitoring (Adult Blood Pressure Cuff Lg) KIT   No No   Sig: Use in the morning   Patient not taking: Reported on 6/5/2025   ONETOUCH DELICA LANCETS FINE MISC  Self Yes No   Sig: in the morning and in the evening and before bedtime. Test.   Patient not taking: Reported on 6/5/2025   Thiamine Mononitrate (VITAMIN B1) 100 mg tablet   No No   Sig: Take 1 tablet (100 mg total) by mouth in the morning.   albuterol (2.5 mg/3 mL) 0.083 % nebulizer solution   No No   Sig: Take 3 mL (2.5 mg total) by nebulization 3 (three) times a day   albuterol (PROVENTIL HFA,VENTOLIN HFA) 90 mcg/act inhaler   No No   Sig: Inhale 2 puffs every 6  (six) hours as needed for wheezing or shortness of breath   apixaban (Eliquis) 5 mg   No No   Sig: Take 1 tablet (5 mg total) by mouth in the morning and 1 tablet (5 mg total) in the evening.   aspirin 81 mg chewable tablet   No No   Sig: Chew 1 tablet (81 mg total) in the morning.   atorvastatin (LIPITOR) 40 mg tablet   No No   Sig: Take 1 tablet (40 mg total) by mouth in the morning.   cephalexin (KEFLEX) 500 mg capsule   No No   Sig: Take 1 capsule (500 mg total) by mouth every 6 (six) hours for 7 days   dextromethorphan-guaiFENesin (ROBITUSSIN DM)  mg/5 mL syrup   No No   Sig: Take 5 mL by mouth 3 (three) times a day as needed for cough   diltiazem (CARDIZEM SR) 120 mg 12 hr capsule   No No   Sig: Take 1 capsule (120 mg total) by mouth in the morning and 1 capsule (120 mg total) in the evening.   ferrous sulfate 325 (65 Fe) mg tablet   No No   Sig: Take 1 tablet (325 mg total) by mouth daily with breakfast   fluticasone-vilanterol 200-25 mcg/actuation inhaler   No No   Sig: Inhale 1 puff in the morning. Rinse mouth after use.   folic acid (FOLVITE) 400 mcg tablet   No No   Sig: Take 1 tablet (400 mcg total) by mouth in the morning.   gabapentin (NEURONTIN) 300 mg capsule   No No   Sig: Take 1 capsule (300 mg total) by mouth in the morning and 1 capsule (300 mg total) in the evening and 1 capsule (300 mg total) before bedtime.   metoprolol succinate (TOPROL-XL) 100 mg 24 hr tablet   No No   Sig: Take 1 tablet (100 mg total) by mouth daily   pantoprazole (PROTONIX) 40 mg tablet   No No   Sig: Take 1 tablet (40 mg total) by mouth in the morning.   ranolazine (RANEXA) 500 mg 12 hr tablet   No No   Sig: Take 1 tablet (500 mg total) by mouth in the morning and 1 tablet (500 mg total) in the evening.   saccharomyces boulardii (FLORASTOR) 250 mg capsule   No No   Sig: Take 1 capsule (250 mg total) by mouth in the morning and 1 capsule (250 mg total) in the evening.   tamsulosin (FLOMAX) 0.4 mg   No No   Sig: Take  1 capsule (0.4 mg total) by mouth daily with dinner   torsemide (DEMADEX) 10 mg tablet   No No   Sig: Take 1 tablet (10 mg total) by mouth every other day      Facility-Administered Medications: None     Discharge Medication List as of 7/17/2025 12:30 AM        CONTINUE these medications which have NOT CHANGED    Details   albuterol (2.5 mg/3 mL) 0.083 % nebulizer solution Take 3 mL (2.5 mg total) by nebulization 3 (three) times a day, Starting Thu 6/12/2025, Normal      albuterol (PROVENTIL HFA,VENTOLIN HFA) 90 mcg/act inhaler Inhale 2 puffs every 6 (six) hours as needed for wheezing or shortness of breath, Starting Thu 6/12/2025, Normal      apixaban (Eliquis) 5 mg Take 1 tablet (5 mg total) by mouth in the morning and 1 tablet (5 mg total) in the evening., Starting Thu 6/12/2025, Normal      aspirin 81 mg chewable tablet Chew 1 tablet (81 mg total) in the morning., Starting Thu 6/12/2025, Normal      atorvastatin (LIPITOR) 40 mg tablet Take 1 tablet (40 mg total) by mouth in the morning., Starting Thu 6/12/2025, Normal      Blood Glucose Monitoring Suppl (ONE TOUCH ULTRA MINI) w/Device KIT Use as directed, Starting Thu 5/16/2019, Historical Med      Blood Pressure Monitoring (Adult Blood Pressure Cuff Lg) KIT Use in the morning, Starting Thu 12/14/2023, Normal      cephalexin (KEFLEX) 500 mg capsule Take 1 capsule (500 mg total) by mouth every 6 (six) hours for 7 days, Starting Sun 7/13/2025, Until Sun 7/20/2025, Normal      dextromethorphan-guaiFENesin (ROBITUSSIN DM)  mg/5 mL syrup Take 5 mL by mouth 3 (three) times a day as needed for cough, Starting Mon 7/14/2025, Normal      diltiazem (CARDIZEM SR) 120 mg 12 hr capsule Take 1 capsule (120 mg total) by mouth in the morning and 1 capsule (120 mg total) in the evening., Starting u 6/12/2025, Normal      ferrous sulfate 325 (65 Fe) mg tablet Take 1 tablet (325 mg total) by mouth daily with breakfast, Starting Thu 6/12/2025, Normal       fluticasone-vilanterol 200-25 mcg/actuation inhaler Inhale 1 puff in the morning. Rinse mouth after use., Starting Th 6/12/2025, Normal      folic acid (FOLVITE) 400 mcg tablet Take 1 tablet (400 mcg total) by mouth in the morning., Starting Th 6/12/2025, Normal      gabapentin (NEURONTIN) 300 mg capsule Take 1 capsule (300 mg total) by mouth in the morning and 1 capsule (300 mg total) in the evening and 1 capsule (300 mg total) before bedtime., Starting Th 6/12/2025, Normal      metoprolol succinate (TOPROL-XL) 100 mg 24 hr tablet Take 1 tablet (100 mg total) by mouth daily, Starting Th 6/12/2025, Normal      ONETOUCH DELICA LANCETS FINE MISC in the morning and in the evening and before bedtime. Test., Starting Thu 5/16/2019, Historical Med      pantoprazole (PROTONIX) 40 mg tablet Take 1 tablet (40 mg total) by mouth in the morning., Starting Th 6/12/2025, Normal      ranolazine (RANEXA) 500 mg 12 hr tablet Take 1 tablet (500 mg total) by mouth in the morning and 1 tablet (500 mg total) in the evening., Starting Th 6/12/2025, Normal      saccharomyces boulardii (FLORASTOR) 250 mg capsule Take 1 capsule (250 mg total) by mouth in the morning and 1 capsule (250 mg total) in the evening., Starting Thu 6/12/2025, Normal      tamsulosin (FLOMAX) 0.4 mg Take 1 capsule (0.4 mg total) by mouth daily with dinner, Starting Thu 6/12/2025, Normal      Thiamine Mononitrate (VITAMIN B1) 100 mg tablet Take 1 tablet (100 mg total) by mouth in the morning., Starting Th 6/12/2025, No Print      torsemide (DEMADEX) 10 mg tablet Take 1 tablet (10 mg total) by mouth every other day, Starting Th 6/12/2025, Until Sat 7/12/2025, Normal           No discharge procedures on file.  ED SEPSIS DOCUMENTATION   Time reflects when diagnosis was documented in both MDM as applicable and the Disposition within this note       Time User Action Codes Description Comment    7/17/2025 12:27 AM Mili Patel Add [Z48.00] Dressing change   "                  [1]   Past Medical History:  Diagnosis Date    Acute encephalopathy 06/07/2025    Acute on chronic kidney failure  (HCC)     Alcohol abuse     Alcohol withdrawal (HCC) 10/09/2018    Atrial fibrillation (HCC)     Cancer (HCC)     prostate ca,had radiation    Cardiac disease     stents,then triple bypass    COPD (chronic obstructive pulmonary disease) (HCC)     Coronary artery disease     Elevated troponin 09/28/2023    ETOH abuse     Heart failure (HCC)     History of heart surgery     says triple bypass [facility]    Hx of heart artery stent     2014    Hyperlipidemia     Hypertension     Hypoglycemia 06/05/2025    Hyponatremia 10/17/2019    Hypovolemic shock (HCC) 12/22/2019    Increased anion gap 04/21/2021    Lumbar spondylitis (HCC) 10/13/2022    Metabolic acidosis, increased anion gap 04/21/2021    Nasal bone fracture 10/10/2022    Prostate CA (HCC)     S/P CABG x 3     2004    Sleep apnea    [2]   Past Surgical History:  Procedure Laterality Date    CARDIAC CATHETERIZATION      2 stents    CORONARY ARTERY BYPASS GRAFT      CORONARY ARTERY BYPASS GRAFT  2004    [place] ARTHRD ANT INTERBODY MIN DSC CRV BELOW C2 N/A 12/16/2020    Procedure: Anterior cervical discectomy with fusion C4-C7; Posterior cervical decompression and fusion C2-T2;  Surgeon: David Rowell MD;  Location: [facility];  Service: Neurosurgery    TONSILLECTOMY     [3]   Family History  Problem Relation Name Age of Onset    Diabetes Mother      Uterine cancer Mother      COPD Father      Hypertension Father     [4]   Social History  Tobacco Use    Smoking status: Every Day     Current packs/day: 1.50     Average packs/day: 1.5 packs/day for 40.0 years (60.0 ttl pk-yrs)     Types: Cigarettes    Smokeless tobacco: Never   Vaping Use    Vaping status: Never Used   Substance Use Topics    Alcohol use: Yes     Alcohol/week: 4.0 standard drinks of alcohol     Types: 4 Standard drinks or equivalent per week     Comment: \"quart a day\"    " Drug use: No        Mili Patel MD  07/17/25 9701

## 2025-07-17 NOTE — UTILIZATION REVIEW
NOTIFICATION OF ADMISSION DISCHARGE   This is a Notification of Discharge from [facility]. Please be advised that this patient has been discharge from our facility. Below you will find the admission and discharge date and time including the patient’s disposition.   UTILIZATION REVIEW CONTACT:  Utilization Review Assistants  Network Utilization Review Department  Phone: [phone] x carefully listen to the prompts. All voicemails are confidential.  Email: [email or web address]     ADMISSION INFORMATION  PRESENTATION DATE: 7/15/2025  5:01 PM  OBERVATION ADMISSION DATE: N/A  INPATIENT ADMISSION DATE: 7/15/25  6:39 PM   DISCHARGE DATE: 7/16/2025  1:16 PM   DISPOSITION:Home/Self Care    Network Utilization Review Department  ATTENTION: Please call with any questions or concerns to [phone] and carefully listen to the prompts so that you are directed to the right person. All voicemails are confidential.   For Discharge needs, contact Care Management DC Support Team at [phone] opt. 2  Send all requests for admission clinical reviews, approved or denied determinations and any other requests to dedicated fax number below belonging to the campus where the patient is receiving treatment. List of dedicated fax numbers for the Facilities:  FACILITY NAME UR FAX NUMBER   ADMISSION DENIALS (Administrative/Medical Necessity) [phone]   DISCHARGE SUPPORT TEAM ([facility]) [phone]   PARENT CHILD HEALTH (Maternity/NICU/Pediatrics) [phone]   [facility] [phone]   [facility] [phone]   [facility] [phone]   [facility] [phone]   [facility] [phone]   [facility] [phone]   [facility] [phone]   [facility] [phone]   [facility]  [facility] [phone]   [facility] [phone]   [facility] [phone]   [facility] [phone]

## 2025-07-17 NOTE — DISCHARGE INSTRUCTIONS
Follow-up with primary care for further care. Contact info provided below if needed.  Use provided ointment when change dressings. Change dressing daily.  Take previously prescribed antibiotic (should be at [facility] if not previously picked up).  Follow up with your alcohol cessation program.

## 2025-07-18 ENCOUNTER — HOSPITAL ENCOUNTER (EMERGENCY)
Facility: HOSPITAL | Age: 65
Discharge: HOME | End: 2025-07-18
Attending: STUDENT IN AN ORGANIZED HEALTH CARE EDUCATION/TRAINING PROGRAM
Payer: COMMERCIAL

## 2025-07-18 ENCOUNTER — DOCUMENTATION (OUTPATIENT)
Dept: ADMINISTRATIVE | Facility: OTHER | Age: 65
End: 2025-07-18

## 2025-07-18 ENCOUNTER — APPOINTMENT (EMERGENCY)
Dept: RADIOLOGY | Facility: HOSPITAL | Age: 65
End: 2025-07-18
Payer: COMMERCIAL

## 2025-07-18 VITALS
TEMPERATURE: 97.8 F | HEART RATE: 100 BPM | SYSTOLIC BLOOD PRESSURE: 157 MMHG | RESPIRATION RATE: 18 BRPM | DIASTOLIC BLOOD PRESSURE: 87 MMHG | OXYGEN SATURATION: 92 %

## 2025-07-18 DIAGNOSIS — F10.929 ALCOHOL INTOXICATION (HCC): Primary | ICD-10-CM

## 2025-07-18 LAB
2HR DELTA HS TROPONIN: 0 NG/L
ANION GAP SERPL CALCULATED.3IONS-SCNC: 12 MMOL/L (ref 4–13)
BASOPHILS # BLD AUTO: 0.08 THOUSANDS/ÂΜL (ref 0–0.1)
BASOPHILS NFR BLD AUTO: 2 % (ref 0–1)
BUN SERPL-MCNC: 13 MG/DL (ref 5–25)
CALCIUM SERPL-MCNC: 7.4 MG/DL (ref 8.4–10.2)
CARDIAC TROPONIN I PNL SERPL HS: 20 NG/L (ref ?–50)
CARDIAC TROPONIN I PNL SERPL HS: 20 NG/L (ref ?–50)
CHLORIDE SERPL-SCNC: 102 MMOL/L (ref 96–108)
CO2 SERPL-SCNC: 24 MMOL/L (ref 21–32)
CREAT SERPL-MCNC: 0.9 MG/DL (ref 0.6–1.3)
EOSINOPHIL # BLD AUTO: 0.02 THOUSAND/ÂΜL (ref 0–0.61)
EOSINOPHIL NFR BLD AUTO: 1 % (ref 0–6)
ERYTHROCYTE [DISTWIDTH] IN BLOOD BY AUTOMATED COUNT: 18.6 % (ref 11.6–15.1)
GFR SERPL CREATININE-BSD FRML MDRD: 90 ML/MIN/1.73SQ M
GLUCOSE SERPL-MCNC: 87 MG/DL (ref 65–140)
HCT VFR BLD AUTO: 28.9 % (ref 36.5–49.3)
HGB BLD-MCNC: 9.1 G/DL (ref 12–17)
IMM GRANULOCYTES # BLD AUTO: 0.01 THOUSAND/UL (ref 0–0.2)
IMM GRANULOCYTES NFR BLD AUTO: 0 % (ref 0–2)
LYMPHOCYTES # BLD AUTO: 1.03 THOUSANDS/ÂΜL (ref 0.6–4.47)
LYMPHOCYTES NFR BLD AUTO: 27 % (ref 14–44)
MCH RBC QN AUTO: 30.3 PG (ref 26.8–34.3)
MCHC RBC AUTO-ENTMCNC: 31.5 G/DL (ref 31.4–37.4)
MCV RBC AUTO: 96 FL (ref 82–98)
MONOCYTES # BLD AUTO: 0.41 THOUSAND/ÂΜL (ref 0.17–1.22)
MONOCYTES NFR BLD AUTO: 11 % (ref 4–12)
NEUTROPHILS # BLD AUTO: 2.28 THOUSANDS/ÂΜL (ref 1.85–7.62)
NEUTS SEG NFR BLD AUTO: 59 % (ref 43–75)
NRBC BLD AUTO-RTO: 1 /100 WBCS
PLATELET # BLD AUTO: 80 THOUSANDS/UL (ref 149–390)
PMV BLD AUTO: 11.4 FL (ref 8.9–12.7)
POTASSIUM SERPL-SCNC: 4.2 MMOL/L (ref 3.5–5.3)
QRS AXIS: 98 DEGREES
QRSD INTERVAL: 98 MS
QT INTERVAL: 410 MS
QTC INTERVAL: 464 MS
RBC # BLD AUTO: 3 MILLION/UL (ref 3.88–5.62)
SODIUM SERPL-SCNC: 138 MMOL/L (ref 135–147)
T WAVE AXIS: 103 DEGREES
VENTRICULAR RATE: 77 BPM
WBC # BLD AUTO: 3.83 THOUSAND/UL (ref 4.31–10.16)

## 2025-07-18 PROCEDURE — 36415 COLL VENOUS BLD VENIPUNCTURE: CPT | Performed by: STUDENT IN AN ORGANIZED HEALTH CARE EDUCATION/TRAINING PROGRAM

## 2025-07-18 PROCEDURE — 84484 ASSAY OF TROPONIN QUANT: CPT | Performed by: STUDENT IN AN ORGANIZED HEALTH CARE EDUCATION/TRAINING PROGRAM

## 2025-07-18 PROCEDURE — 96361 HYDRATE IV INFUSION ADD-ON: CPT

## 2025-07-18 PROCEDURE — 93010 ELECTROCARDIOGRAM REPORT: CPT | Performed by: INTERNAL MEDICINE

## 2025-07-18 PROCEDURE — 71045 X-RAY EXAM CHEST 1 VIEW: CPT

## 2025-07-18 PROCEDURE — 99285 EMERGENCY DEPT VISIT HI MDM: CPT | Performed by: STUDENT IN AN ORGANIZED HEALTH CARE EDUCATION/TRAINING PROGRAM

## 2025-07-18 PROCEDURE — 96360 HYDRATION IV INFUSION INIT: CPT

## 2025-07-18 PROCEDURE — 85025 COMPLETE CBC W/AUTO DIFF WBC: CPT | Performed by: STUDENT IN AN ORGANIZED HEALTH CARE EDUCATION/TRAINING PROGRAM

## 2025-07-18 PROCEDURE — 99285 EMERGENCY DEPT VISIT HI MDM: CPT

## 2025-07-18 PROCEDURE — 93005 ELECTROCARDIOGRAM TRACING: CPT

## 2025-07-18 PROCEDURE — 80048 BASIC METABOLIC PNL TOTAL CA: CPT | Performed by: STUDENT IN AN ORGANIZED HEALTH CARE EDUCATION/TRAINING PROGRAM

## 2025-07-18 PROCEDURE — NC001 PR NO CHARGE: Performed by: EMERGENCY MEDICINE

## 2025-07-18 RX ORDER — SODIUM CHLORIDE 9 MG/ML
3 INJECTION INTRAVENOUS
Status: DISCONTINUED | OUTPATIENT
Start: 2025-07-18 | End: 2025-07-18

## 2025-07-18 RX ADMIN — SODIUM CHLORIDE 1000 ML: 0.9 INJECTION, SOLUTION INTRAVENOUS at 02:25

## 2025-07-18 NOTE — ED NOTES
Pt moved to [place] [id number] .      Maria Del Carmen Laurent, CHRISTIE  07/18/25 2967     [Time Spent: ___ minutes] : I have spent [unfilled] minutes of time on the encounter which excludes teaching and separately reported services.

## 2025-07-18 NOTE — ED NOTES
Pt Resting / sleeping  comfortably.  Cotinue to monitor pt.      Maria Del Carmen Laurent RN  07/18/25 [id number]

## 2025-07-18 NOTE — ED NOTES
Pt ambulated to the  w/ assistx1.     Maria Del Carmen Laurent RN  07/18/25 [id number]       Maria Del Carmen Laurent RN  07/18/25 5432

## 2025-07-18 NOTE — ED NOTES
Patient discharged. Wheelchair transport arrived for patient. Patient left department with IV still in place. [facility] contacted to make contact with patient. Then informed communications when contact is made.      Jonel Ryder RN  07/18/25 [phone]

## 2025-07-18 NOTE — ED CARE HANDOFF
[facility] Warm Handoff Outcome Note    Patient name Gregg Partida  Location [facility] Pool Room/[facility] MRN [id number]  Age: 65 y.o.          Plan Type:  Warm Handoff                                                                                    Plan Date: 7/18/2025  Service:  ED Warm Handoff      Substance Use History:  ETOH    Warm Handoff Update:  Pt d/c home    Warm Handoff Outcome: Non-Treatment Related Resources

## 2025-07-18 NOTE — ED PROVIDER NOTES
Time reflects when diagnosis was documented in both MDM as applicable and the Disposition within this note       Time User Action Codes Description Comment    7/18/2025  7:18 AM Gilberto North Add [F11.9] Alcohol intoxication (HCC)           ED Disposition       ED Disposition   Discharge    Condition   Stable    Date/Time   Fri Jul 18, 2025  6:11 AM    Comment   Gregg Partida discharge to home/self care.                   Assessment & Plan       Medical Decision Making  Patient is a 65 y.o. male who presents to the ED for alcohol intoxication.  Patient is nontoxic, well-appearing.     Differential includes but is not limited to: Alcohol intoxication.  No evidence of withdrawal at this point.  Patient states not feeling well and does have cardiac history.  Possible exacerbation of cardiac condition?    Plan: Labs, IV fluids, reassess                   Amount and/or Complexity of Data Reviewed  Labs: ordered.  Radiology: ordered and independent interpretation performed.    Risk  Prescription drug management.        ED Course as of 07/18/25 0719   Fri Jul 18, 2025   0717 Pt signed out to Dr Muñoz. Pending re-eval once sober       Medications   sodium chloride 0.9 % bolus 1,000 mL (0 mL Intravenous Stopped 7/18/25 0425)       ED Risk Strat Scores   HEART Risk Score      Flowsheet Row Most Recent Value   Heart Score Risk Calculator    History 0 Filed at: 07/18/2025 0717   ECG 1 Filed at: 07/18/2025 0717   Age 1 Filed at: 07/18/2025 0717   Risk Factors 2 Filed at: 07/18/2025 0717   Troponin 1 Filed at: 07/18/2025 0717   HEART Score 5 Filed at: 07/18/2025 0717          HEART Risk Score      Flowsheet Row Most Recent Value   Heart Score Risk Calculator    History 0 Filed at: 07/18/2025 0717   ECG 1 Filed at: 07/18/2025 0717   Age 1 Filed at: 07/18/2025 0717   Risk Factors 2 Filed at: 07/18/2025 0717   Troponin 1 Filed at: 07/18/2025 0717   HEART Score 5 Filed at: 07/18/2025 0717                      No data  recorded        SBIRT 22yo+      Flowsheet Row Most Recent Value   Initial Alcohol Screen: US AUDIT-C     1. How often do you have a drink containing alcohol? 6 Filed at: 07/18/2025 0211   2. How many drinks containing alcohol do you have on a typical day you are drinking?  6 Filed at: 07/18/2025 0211   3a. Male UNDER 65: How often do you have five or more drinks on one occasion? 6 Filed at: 07/18/2025 0211   Audit-C Score 18 Filed at: 07/18/2025 0211   BRIGIDA: How many times in the past year have you...    Used an illegal drug or used a prescription medication for non-medical reasons? Never Filed at: 07/18/2025 0211                            History of Present Illness       Chief Complaint   Patient presents with    Chest Pain     Patient reports cp, hand pain, and ETOH       Past Medical History[1]   Past Surgical History[2]   Family History[3]   Social History[4]   E-Cigarette/Vaping    E-Cigarette Use Never User       E-Cigarette/Vaping Substances    Nicotine Yes     THC No     CBD No     Flavoring No     Other No     Unknown No       I have reviewed and agree with the history as documented.     65-year-old male, with multiple frequent visits to the emergency department, who presents to the emergency department for alcohol intoxication.  Drank a bottle of vodka prior to arrival.  States he does not feel well.  Denies any falls.  States he eventually wants help with his drinking.  No other complaints or concerns.          Review of Systems   All other systems reviewed and are negative.          Objective       ED Triage Vitals   Temperature Pulse Blood Pressure Respirations SpO2 Patient Position - Orthostatic VS   07/18/25 0617 07/18/25 0209 07/18/25 0214 07/18/25 0209 07/18/25 0214 07/18/25 0715   97.9 °F (36.6 °C) 81 107/66 20 93 % Lying      Temp Source Heart Rate Source BP Location FiO2 (%) Pain Score    07/18/25 0617 07/18/25 0209 07/18/25 0715 -- 07/18/25 0715    Tympanic Monitor Right arm  3      Vitals       Date and Time Temp Pulse SpO2 Resp BP Pain Score FACES Pain Rating User   07/18/25 0715 97.4 °F (36.3 °C) 87 97 % 19 112/69 3 -- MD   07/18/25 0617 97.9 °F (36.6 °C) -- -- -- -- -- --    07/18/25 0214 -- -- 93 % -- 107/66 -- --    07/18/25 0209 -- 81 -- 20 -- -- -- KD            Physical Exam  Vitals and nursing note reviewed.   Constitutional:       General: He is not in acute distress.     Appearance: He is well-developed. He is not diaphoretic.   HENT:      Head: Normocephalic and atraumatic.      Right Ear: External ear normal.      Left Ear: External ear normal.      Nose: Nose normal.     Eyes:      General: Lids are normal. No scleral icterus.      Cardiovascular:      Rate and Rhythm: Normal rate and regular rhythm.      Heart sounds: Normal heart sounds. No murmur heard.     No friction rub. No gallop.   Pulmonary:      Effort: Pulmonary effort is normal. No respiratory distress.      Breath sounds: Normal breath sounds. No wheezing or rales.   Abdominal:      Palpations: Abdomen is soft.      Tenderness: There is no abdominal tenderness. There is no guarding or rebound.     Musculoskeletal:         General: No deformity. Normal range of motion.      Cervical back: Normal range of motion and neck supple.     Skin:     General: Skin is warm and dry.      Comments: Scattered healing abrasions and old bruises noted over patient's body.  No evidence of new injury to the head or body     Neurological:      General: No focal deficit present.      Mental Status: He is alert.     Psychiatric:         Mood and Affect: Mood normal.         Behavior: Behavior normal.         Results Reviewed       Procedure Component Value Units Date/Time    HS Troponin I 2hr [[id number]]  (Normal) Collected: 07/18/25 0409    Lab Status: Final result Specimen: Blood from Arm, Left Updated: 07/18/25 0436     hs TnI 2hr 20 ng/L      Delta 2hr hsTnI 0 ng/L     HS Troponin 0hr (reflex protocol) [[id number]]  (Normal) Collected:  07/18/25 0221    Lab Status: Final result Specimen: Blood from Arm, Left Updated: 07/18/25 0249     hs TnI 0hr 20 ng/L     Basic metabolic panel [[id number]]  (Abnormal) Collected: 07/18/25 0221    Lab Status: Final result Specimen: Blood from Arm, Left Updated: 07/18/25 0245     Sodium 138 mmol/L      Potassium 4.2 mmol/L      Chloride 102 mmol/L      CO2 24 mmol/L      ANION GAP 12 mmol/L      BUN 13 mg/dL      Creatinine 0.90 mg/dL      Glucose 87 mg/dL      Calcium 7.4 mg/dL      eGFR 90 ml/min/1.73sq m     Narrative:      National Kid[employer] Foundation guidelines for Chronic Kidney Disease (CKD):     Stage 1 with normal or high GFR (GFR > 90 mL/min/1.73 square meters)    Stage 2 Mild CKD (GFR = 60-89 mL/min/1.73 square meters)    Stage 3A Moderate CKD (GFR = 45-59 mL/min/1.73 square meters)    Stage 3B Moderate CKD (GFR = 30-44 mL/min/1.73 square meters)    Stage 4 Severe CKD (GFR = 15-29 mL/min/1.73 square meters)    Stage 5 End Stage CKD (GFR <15 mL/min/1.73 square meters)  Note: GFR calculation is accurate only with a steady state creatinine    CBC and differential [[id number]]  (Abnormal) Collected: 07/18/25 0221    Lab Status: Final result Specimen: Blood from Arm, Left Updated: 07/18/25 0238     WBC 3.83 Thousand/uL      RBC 3.00 Million/uL      Hemoglobin 9.1 g/dL      Hematocrit 28.9 %      MCV 96 fL      MCH 30.3 pg      MCHC 31.5 g/dL      RDW 18.6 %      MPV 11.4 fL      Platelets 80 Thousands/uL      nRBC 1 /100 WBCs      Segmented % 59 %      Immature Grans % 0 %      Lymphocytes % 27 %      Monocytes % 11 %      Eosinophils Relative 1 %      Basophils Relative 2 %      Absolute Neutrophils 2.28 Thousands/µL      Absolute Immature Grans 0.01 Thousand/uL      Absolute Lymphocytes 1.03 Thousands/µL      Absolute Monocytes 0.41 Thousand/µL      Eosinophils Absolute 0.02 Thousand/µL      Basophils Absolute 0.08 Thousands/µL             X-ray chest 1 view portable   ED Interpretation by Gilberto VILLA  DO Can (07/18 2339)   No acute cardiopulmonary disease          ECG 12 Lead Documentation Only    Date/Time: 7/18/2025 6:32 AM    Performed by: Gilberto North DO  Authorized by: Gilberto North DO    ECG reviewed by me, the ED Provider: yes    Patient location:  ED  Interpretation:     Interpretation: abnormal    Quality:     Tracing quality:  Limited by artifact  Rate:     ECG rate:  77    ECG rate assessment: normal    Rhythm:     Rhythm: sinus rhythm    Ectopy:     Ectopy: none    QRS:     QRS axis:  Right  Conduction:     Conduction: normal    ST segments:     ST segments:  Normal  T waves:     T waves: normal        ED Medication and Procedure Management   Prior to Admission Medications   Prescriptions Last Dose Informant Patient Reported? Taking?   Blood Glucose Monitoring Suppl (ONE TOUCH ULTRA MINI) w/Device KIT  Self Yes No   Sig: Use as directed   Patient not taking: Reported on 6/5/2025   Blood Pressure Monitoring (Adult Blood Pressure Cuff Lg) KIT   No No   Sig: Use in the morning   Patient not taking: Reported on 6/5/2025   ONETOUCH DELICA LANCETS FINE MISC  Self Yes No   Sig: in the morning and in the evening and before bedtime. Test.   Patient not taking: Reported on 6/5/2025   Thiamine Mononitrate (VITAMIN B1) 100 mg tablet   No No   Sig: Take 1 tablet (100 mg total) by mouth in the morning.   albuterol (2.5 mg/3 mL) 0.083 % nebulizer solution   No No   Sig: Take 3 mL (2.5 mg total) by nebulization 3 (three) times a day   albuterol (PROVENTIL HFA,VENTOLIN HFA) 90 mcg/act inhaler   No No   Sig: Inhale 2 puffs every 6 (six) hours as needed for wheezing or shortness of breath   apixaban (Eliquis) 5 mg   No No   Sig: Take 1 tablet (5 mg total) by mouth in the morning and 1 tablet (5 mg total) in the evening.   aspirin 81 mg chewable tablet   No No   Sig: Chew 1 tablet (81 mg total) in the morning.   atorvastatin (LIPITOR) 40 mg tablet   No No   Sig: Take 1 tablet (40 mg total) by mouth in the  morning.   cephalexin (KEFLEX) 500 mg capsule   No No   Sig: Take 1 capsule (500 mg total) by mouth every 6 (six) hours for 7 days   dextromethorphan-guaiFENesin (ROBITUSSIN DM)  mg/5 mL syrup   No No   Sig: Take 5 mL by mouth 3 (three) times a day as needed for cough   diltiazem (CARDIZEM SR) 120 mg 12 hr capsule   No No   Sig: Take 1 capsule (120 mg total) by mouth in the morning and 1 capsule (120 mg total) in the evening.   ferrous sulfate 325 (65 Fe) mg tablet   No No   Sig: Take 1 tablet (325 mg total) by mouth daily with breakfast   fluticasone-vilanterol 200-25 mcg/actuation inhaler   No No   Sig: Inhale 1 puff in the morning. Rinse mouth after use.   folic acid (FOLVITE) 400 mcg tablet   No No   Sig: Take 1 tablet (400 mcg total) by mouth in the morning.   gabapentin (NEURONTIN) 300 mg capsule   No No   Sig: Take 1 capsule (300 mg total) by mouth in the morning and 1 capsule (300 mg total) in the evening and 1 capsule (300 mg total) before bedtime.   metoprolol succinate (TOPROL-XL) 100 mg 24 hr tablet   No No   Sig: Take 1 tablet (100 mg total) by mouth daily   pantoprazole (PROTONIX) 40 mg tablet   No No   Sig: Take 1 tablet (40 mg total) by mouth in the morning.   ranolazine (RANEXA) 500 mg 12 hr tablet   No No   Sig: Take 1 tablet (500 mg total) by mouth in the morning and 1 tablet (500 mg total) in the evening.   saccharomyces boulardii (FLORASTOR) 250 mg capsule   No No   Sig: Take 1 capsule (250 mg total) by mouth in the morning and 1 capsule (250 mg total) in the evening.   tamsulosin (FLOMAX) 0.4 mg   No No   Sig: Take 1 capsule (0.4 mg total) by mouth daily with dinner   torsemide (DEMADEX) 10 mg tablet   No No   Sig: Take 1 tablet (10 mg total) by mouth every other day      Facility-Administered Medications: None     Patient's Medications   Discharge Prescriptions    No medications on file     No discharge procedures on file.  ED SEPSIS DOCUMENTATION   Time reflects when diagnosis was  documented in both MDM as applicable and the Disposition within this note       Time User Action Codes Description Comment    7/18/2025  7:18 AM Gilberto North Add [H43.883] Alcohol intoxication (HCC)                      [1]   Past Medical History:  Diagnosis Date    Acute encephalopathy 06/07/2025    Acute on chronic kidney failure  (HCC)     Alcohol abuse     Alcohol withdrawal (HCC) 10/09/2018    Atrial fibrillation (HCC)     Cancer (HCC)     prostate ca,had radiation    Cardiac disease     stents,then triple bypass    COPD (chronic obstructive pulmonary disease) (HCC)     Coronary artery disease     Elevated troponin 09/28/2023    ETOH abuse     Heart failure (HCC)     History of heart surgery     says [facility] bypass [facility]    Hx of heart artery stent     2014    Hyperlipidemia     Hypertension     Hypoglycemia 06/05/2025    Hyponatremia 10/17/2019    Hypovolemic shock (HCC) 12/22/2019    Increased anion gap 04/21/2021    Lumbar spondylitis (HCC) 10/13/2022    Metabolic acidosis, increased anion gap 04/21/2021    Nasal bone fracture 10/10/2022    Prostate CA (HCC)     S/P CABG x 3     2004    Sleep apnea    [2]   Past Surgical History:  Procedure Laterality Date    CARDIAC CATHETERIZATION      2 stents    CORONARY ARTERY BYPASS GRAFT      CORONARY ARTERY BYPASS GRAFT  2004    IA ARTHRD ANT INTERBODY MIN DSC CRV BELOW C2 N/A 12/16/2020    Procedure: Anterior cervical discectomy with fusion C4-C7; Posterior cervical decompression and fusion C2-T2;  Surgeon: David Rowell MD;  Location: [facility];  Service: Neurosurgery    TONSILLECTOMY     [3]   Family History  Problem Relation Name Age of Onset    Diabetes Mother      Uterine cancer Mother      COPD Father      Hypertension Father     [4]   Social History  Tobacco Use    Smoking status: Every Day     Current packs/day: 1.50     Average packs/day: 1.5 packs/day for 40.0 years (60.0 ttl pk-yrs)     Types: Cigarettes    Smokeless tobacco: Never   Vaping  "Use    Vaping status: Never Used   Substance Use Topics    Alcohol use: Yes     Alcohol/week: 4.0 standard drinks of alcohol     Types: 4 Standard drinks or equivalent per week     Comment: \"quart a day\"    Drug use: No        Gilberto North,   07/18/25 0719    "

## 2025-07-18 NOTE — ED PROVIDER NOTES
Patient awake and alert, normal respiratory effort, tolerating oral intake, denies any acute complaints.  Patient will be discharged, will arrange for transport to take home.     Mina Muñoz MD  07/18/25 4737

## 2025-07-19 ENCOUNTER — APPOINTMENT (EMERGENCY)
Dept: RADIOLOGY | Facility: HOSPITAL | Age: 65
End: 2025-07-19
Payer: COMMERCIAL

## 2025-07-19 ENCOUNTER — HOSPITAL ENCOUNTER (EMERGENCY)
Facility: HOSPITAL | Age: 65
Discharge: HOME | End: 2025-07-19
Attending: STUDENT IN AN ORGANIZED HEALTH CARE EDUCATION/TRAINING PROGRAM | Admitting: EMERGENCY MEDICINE
Payer: COMMERCIAL

## 2025-07-19 VITALS
DIASTOLIC BLOOD PRESSURE: 78 MMHG | HEART RATE: 81 BPM | RESPIRATION RATE: 20 BRPM | OXYGEN SATURATION: 96 % | TEMPERATURE: 97.9 F | SYSTOLIC BLOOD PRESSURE: 136 MMHG

## 2025-07-19 DIAGNOSIS — F10.929 ALCOHOL INTOXICATION (HCC): Primary | ICD-10-CM

## 2025-07-19 PROCEDURE — 73090 X-RAY EXAM OF FOREARM: CPT

## 2025-07-19 PROCEDURE — 72125 CT NECK SPINE W/O DYE: CPT

## 2025-07-19 PROCEDURE — 99284 EMERGENCY DEPT VISIT MOD MDM: CPT

## 2025-07-19 PROCEDURE — 71045 X-RAY EXAM CHEST 1 VIEW: CPT

## 2025-07-19 PROCEDURE — 99284 EMERGENCY DEPT VISIT MOD MDM: CPT | Performed by: STUDENT IN AN ORGANIZED HEALTH CARE EDUCATION/TRAINING PROGRAM

## 2025-07-19 PROCEDURE — 70450 CT HEAD/BRAIN W/O DYE: CPT

## 2025-07-19 NOTE — ED NOTES
"This RN called to bedside to present the patient with the Code of Conduct. Patient informed that he is currently in violation of the Code of Conduct by telling the physician to \"go fuck himself\" in exchange for being told that he cannot have a sandwich. Patient also educated on proper ways to communicate with staff that do not include shouting vulgarities. Pt verbalizes understanding      Rebecca Rodriguez RN  07/19/25 0606    "

## 2025-07-19 NOTE — ED NOTES
"Patient requesting to use phone to call cab, advised patient he is too intoxicated patient yelling \"bullshit\" requesting sandwich, per doctors orders nothing by mouth due to imaging studies not resulted, yelling again \"bullshit, let me talk to that mother clairkylie\" doctor aware and spoke to patient     Kelly Tracey, RN  07/19/25 0557    "

## 2025-07-19 NOTE — DISCHARGE INSTRUCTIONS
Immediate Brief Procedure Note  Patient Name:  Tejinder Guo  YOB: 1934  DATE OF PROCEDURE : 2/15/2017  PROCEDURALIST: Armando Newby MD  ASSISTANT(S):  None  ANESTHESIA TYPE:  Moderate sedation.  ANESTHESIOLOGIST:  None    PROCEDURE PERFORMED:  Left liver mass biopsy    Pre-procedure Dx:   Patient Active Problem List   Diagnosis   • Benign non-nodular prostatic hyperplasia with lower urinary tract symptoms   • Essential hypertension, benign   • Benign neoplasm of pancreas, except islets of Langerhans   • multiple stable pulmonary nodules   • Chronic obstructive asthma, unspecified   • Myalgia and myositis, unspecified   • Degeneration of lumbar or lumbosacral intervertebral disc   • Malignant neoplasm of bronchus and lung, unspecified site   • Scotoma   • Warthin's tumor   • Insomnia, unspecified   • Hip osteoarthritis   • CAD - Calciu score 200 - Chest CT 12/2014    • Restless legs syndrome   • S/P total hip arthroplasty, right   • Osteoarthrosis, unspecified whether generalized or localized, pelvic region and thigh   • B12 deficiency   • Primary osteoarthritis of both hips   • Aortic sclerosis   • LAFB (left anterior fascicular block)       Post-procedure Dx: Same    Findings: Technically successful left liver mass biopsy.     Estimated Blood Loss: less than 5 ml    Complications:  None    Specimens Removed: Yes       Patient Education     Alcohol Intoxication ED   General Information   You came to the Emergency Department (ED) for alcohol intoxication. This happens when someone drinks too much in a short amount of time. Alcohol intoxication is the same as being drunk. Severe alcohol intoxication is known as alcohol poisoning. It can make you pass out and can be life threatening if you stop breathing or choke on your own vomit. Any amount of alcohol is harmful to a child. They can have poisoning even with a low amount of alcohol in their body.  What care is needed at home?   Call your regular doctor to let them know you were in the ED. Make a follow-up appointment if you were told to. Your doctor can help you figure out if you have a problem with alcohol and what kind of help you might need.  To help you figure out if your drinking is a problem, think about these questions:  Have you lost control of your drinking? Do you find that you sometimes drink more than you meant to?  Do you need to drink more and more to feel an effect? Do you feel sick or uncomfortable if you cut back on drinking?  Has your drinking caused you to lose your job, get into trouble with the law, or have relationship problems?  If you do think you have a problem with alcohol, your doctor can talk to you about treatments. There are also things you can do on your own:  Keep a drink journal. Write down how much you drink, where you were, and things that may have triggered your drinking. This can help you learn more about your drinking patterns and make changes.  Some people choose to stop drinking alcohol completely. If you drink alcohol often, check with your doctor before you stop drinking completely. Stopping or cutting back suddenly can be harmful for people who are used to drinking a lot.  You may choose to go to a program aimed at stopping drinking like Alcoholics Anonymous or seek help from a counselor. Talking to others can help you figure out better  ways to handle stress or other problems you are having.  If you do drink, there are things you can do to stay safe and avoid getting alcohol intoxication again:  Limit how much you drink or alternate your drinks with a glass of water or other drink that does have alcohol.  Do not drink on an empty stomach. Food may help slow down absorption.  Do not drive if you have been drinking.  When do I need to get emergency help?   Call for an ambulance right away if:   You or someone else has been drinking alcohol and has signs of alcohol poisoning, such as:  Very slow, shallow, or irregular breathing. Slow breathing is less than 8 breaths in a minute. Irregular breathing includes pauses of 10 or more seconds between breaths.  Choking.  Speech is very slurred or makes no sense.  Skin that looks blue or pale or feels cool to the touch.  Not being able to wake up.  Seizures.  Return to the ED if:   You have milder signs of alcohol withdrawal like:  Feeling anxious, restless, or having trouble sleeping.  Shaking, sweating, or feeling like your heart is racing.  Headache.  Craving alcohol.  Loss of appetite, an upset stomach, or throwing up.  When do I need to call the doctor?   You are not able to remember periods of time after drinking or you black out when you drink.  You feel like you need to cut down on your drinking or feel guilty about drinking but don't know how to stop.  You have new or worsening symptoms.  Last Reviewed Date   2020-10-16  Consumer Information Use and Disclaimer   This generalized information is a limited summary of diagnosis, treatment, and/or medication information. It is not meant to be comprehensive and should be used as a tool to help the user understand and/or assess potential diagnostic and treatment options. It does NOT include all information about conditions, treatments, medications, side effects, or risks that may apply to a specific patient. It is not intended to be medical advice or a  substitute for the medical advice, diagnosis, or treatment of a health care provider based on the health care provider's examination and assessment of a patient’s specific and unique circumstances. Patients must speak with a health care provider for complete information about their health, medical questions, and treatment options, including any risks or benefits regarding use of medications. This information does not endorse any treatments or medications as safe, effective, or approved for treating a specific patient. [employer] and its affiliates disclaim any warranty or liability relating to this information or the use thereof. The use of this information is governed by the Terms of Use, available at https://www.[employer].com/en/know/clinical-effectiveness-terms   Copyright   Copyright © 2024 [employer], [employer] and its affiliates and/or licensors. All rights reserved.

## 2025-07-19 NOTE — ED NOTES
"PT able to walk with a walker, FRANCES arranged. Pt sts \" I want to go home to go to liquor store.\"      Marisabel Birch RN  07/19/25 5887    "

## 2025-07-19 NOTE — ED PROVIDER NOTES
Time reflects when diagnosis was documented in both MDM as applicable and the Disposition within this note       Time User Action Codes Description Comment    7/19/2025  7:03 AM Gilberto North Add [G95.678] Alcohol intoxication (HCC)           ED Disposition       ED Disposition   Discharge    Condition   Stable    Date/Time   Sat Jul 19, 2025  7:03 AM    Comment   Gregg Martínezjann discharge to home/self care.                   Assessment & Plan       Medical Decision Making  Patient is a 65 y.o. male who presents to the ED for alcohol intoxication, status post fall.  Patient is nontoxic, well-appearing.     Differential includes but is not limited to: Alcohol intoxication, intracranial bleeding, skull fracture, C-spine injury    Plan: CT imaging, x-ray, reassess                   Amount and/or Complexity of Data Reviewed  Radiology: ordered and independent interpretation performed.        ED Course as of 07/19/25 0703   Sat Jul 19, 2025   0702 Pt signed out to Dr Madden. Pending re-eval once sober       Medications - No data to display    ED Risk Strat Scores                    No data recorded        SBIRT 20yo+      Flowsheet Row Most Recent Value   Initial Alcohol Screen: US AUDIT-C     1. How often do you have a drink containing alcohol? 6 Filed at: 07/19/2025 0426   2. How many drinks containing alcohol do you have on a typical day you are drinking?  6 Filed at: 07/19/2025 0426   3a. Male UNDER 65: How often do you have five or more drinks on one occasion? 6 Filed at: 07/19/2025 0426   Audit-C Score 18 Filed at: 07/19/2025 0426                            History of Present Illness       Chief Complaint   Patient presents with    Fall     Patient c/o falling and hurting left arm       Past Medical History[1]   Past Surgical History[2]   Family History[3]   Social History[4]   E-Cigarette/Vaping    E-Cigarette Use Never User       E-Cigarette/Vaping Substances    Nicotine Yes     THC No     CBD No     Flavoring  No     Other No     Unknown No       I have reviewed and agree with the history as documented.     65-year-old male, multiple visits for alcohol intoxication and falls, who presents emerged part after fall.  Occurred sometime earlier today.  Complaining of pain to his right arm.  Unable to tell me if he hit his head or not.  Requesting a sandwich.  No other complaints or concerns.      Fall      Review of Systems   All other systems reviewed and are negative.          Objective       ED Triage Vitals [07/19/25 0430]   Temperature Pulse Blood Pressure Respirations SpO2 Patient Position - Orthostatic VS   97.9 °F (36.6 °C) 81 136/78 20 96 % Lying      Temp Source Heart Rate Source BP Location FiO2 (%) Pain Score    Oral Monitor Left arm -- No Pain      Vitals      Date and Time Temp Pulse SpO2 Resp BP Pain Score FACES Pain Rating User   07/19/25 0430 97.9 °F (36.6 °C) 81 96 % 20 136/78 No Pain -- LS            Physical Exam  Vitals and nursing note reviewed.   Constitutional:       General: He is not in acute distress.     Appearance: He is well-developed. He is not ill-appearing, toxic-appearing or diaphoretic.   HENT:      Head: Normocephalic and atraumatic.      Right Ear: External ear normal.      Left Ear: External ear normal.      Nose: Nose normal.     Eyes:      General: Lids are normal. No scleral icterus.      Cardiovascular:      Rate and Rhythm: Normal rate and regular rhythm.      Heart sounds: Normal heart sounds. No murmur heard.     No friction rub. No gallop.   Pulmonary:      Effort: Pulmonary effort is normal. No respiratory distress.      Breath sounds: Normal breath sounds. No wheezing or rales.   Abdominal:      Palpations: Abdomen is soft.      Tenderness: There is no abdominal tenderness. There is no guarding or rebound.     Musculoskeletal:         General: No deformity. Normal range of motion.      Cervical back: Normal range of motion and neck supple.     Skin:     General: Skin is warm and  dry.           Comments: Scattered healing bruises and abrasions over patient's extremities     Neurological:      General: No focal deficit present.      Mental Status: He is alert.     Psychiatric:         Mood and Affect: Mood normal.         Behavior: Behavior normal.         Results Reviewed       None            CT spine cervical without contrast   Final Interpretation by Jesús Pang MD (07/19 0620)      No acute cervical spine fracture or traumatic malalignment.      Stable postsurgical changes of prior anterior and posterior cervical fusion and decompression.      Partially imaged left pleural effusion.               Workstation performed: [id number]         CT head without contrast   Final Interpretation by Jesús Pang MD (07/19 0604)      No acute intracranial abnormality.  Stable chronic microangiopathic changes within the brain.                  Workstation performed: [id number]         XR forearm 2 views RIGHT   ED Interpretation by Gilberto North DO (07/19 0555)   No acute osseous      XR chest 1 view portable   ED Interpretation by Gilberto North DO (07/19 0555)   No acute cardiopulmonary disease          Procedures    ED Medication and Procedure Management   Prior to Admission Medications   Prescriptions Last Dose Informant Patient Reported? Taking?   Blood Glucose Monitoring Suppl (ONE TOUCH ULTRA MINI) w/Device KIT  Self Yes No   Sig: Use as directed   Patient not taking: Reported on 6/5/2025   Blood Pressure Monitoring (Adult Blood Pressure Cuff Lg) KIT   No No   Sig: Use in the morning   Patient not taking: Reported on 6/5/2025   ONETOUCH DELICA LANCETS FINE MISC  Self Yes No   Sig: in the morning and in the evening and before bedtime. Test.   Patient not taking: Reported on 6/5/2025   Thiamine Mononitrate (VITAMIN B1) 100 mg tablet   No No   Sig: Take 1 tablet (100 mg total) by mouth in the morning.   albuterol (2.5 mg/3 mL) 0.083 % nebulizer solution   No No   Sig: Take 3 mL  (2.5 mg total) by nebulization 3 (three) times a day   albuterol (PROVENTIL HFA,VENTOLIN HFA) 90 mcg/act inhaler   No No   Sig: Inhale 2 puffs every 6 (six) hours as needed for wheezing or shortness of breath   apixaban (Eliquis) 5 mg   No No   Sig: Take 1 tablet (5 mg total) by mouth in the morning and 1 tablet (5 mg total) in the evening.   aspirin 81 mg chewable tablet   No No   Sig: Chew 1 tablet (81 mg total) in the morning.   atorvastatin (LIPITOR) 40 mg tablet   No No   Sig: Take 1 tablet (40 mg total) by mouth in the morning.   cephalexin (KEFLEX) 500 mg capsule   No No   Sig: Take 1 capsule (500 mg total) by mouth every 6 (six) hours for 7 days   dextromethorphan-guaiFENesin (ROBITUSSIN DM)  mg/5 mL syrup   No No   Sig: Take 5 mL by mouth 3 (three) times a day as needed for cough   diltiazem (CARDIZEM SR) 120 mg 12 hr capsule   No No   Sig: Take 1 capsule (120 mg total) by mouth in the morning and 1 capsule (120 mg total) in the evening.   ferrous sulfate 325 (65 Fe) mg tablet   No No   Sig: Take 1 tablet (325 mg total) by mouth daily with breakfast   fluticasone-vilanterol 200-25 mcg/actuation inhaler   No No   Sig: Inhale 1 puff in the morning. Rinse mouth after use.   folic acid (FOLVITE) 400 mcg tablet   No No   Sig: Take 1 tablet (400 mcg total) by mouth in the morning.   gabapentin (NEURONTIN) 300 mg capsule   No No   Sig: Take 1 capsule (300 mg total) by mouth in the morning and 1 capsule (300 mg total) in the evening and 1 capsule (300 mg total) before bedtime.   metoprolol succinate (TOPROL-XL) 100 mg 24 hr tablet   No No   Sig: Take 1 tablet (100 mg total) by mouth daily   pantoprazole (PROTONIX) 40 mg tablet   No No   Sig: Take 1 tablet (40 mg total) by mouth in the morning.   ranolazine (RANEXA) 500 mg 12 hr tablet   No No   Sig: Take 1 tablet (500 mg total) by mouth in the morning and 1 tablet (500 mg total) in the evening.   saccharomyces boulardii (FLORASTOR) 250 mg capsule   No No    Sig: Take 1 capsule (250 mg total) by mouth in the morning and 1 capsule (250 mg total) in the evening.   tamsulosin (FLOMAX) 0.4 mg   No No   Sig: Take 1 capsule (0.4 mg total) by mouth daily with dinner   torsemide (DEMADEX) 10 mg tablet   No No   Sig: Take 1 tablet (10 mg total) by mouth every other day      Facility-Administered Medications: None     Patient's Medications   Discharge Prescriptions    No medications on file     No discharge procedures on file.  ED SEPSIS DOCUMENTATION   Time reflects when diagnosis was documented in both MDM as applicable and the Disposition within this note       Time User Action Codes Description Comment    7/19/2025  7:03 AM Gilberto North Add [F10.929] Alcohol intoxication (HCC)                    [1]   Past Medical History:  Diagnosis Date    Acute encephalopathy 06/07/2025    Acute on chronic kidney failure  (HCC)     Alcohol abuse     Alcohol withdrawal (HCC) 10/09/2018    Atrial fibrillation (HCC)     Cancer (HCC)     prostate ca,had radiation    Cardiac disease     stents,then triple bypass    COPD (chronic obstructive pulmonary disease) (HCC)     Coronary artery disease     Elevated troponin 09/28/2023    ETOH abuse     Heart failure (HCC)     History of heart surgery     says triple bypass [facility]    Hx of heart artery stent     2014    Hyperlipidemia     Hypertension     Hypoglycemia 06/05/2025    Hyponatremia 10/17/2019    Hypovolemic shock (HCC) 12/22/2019    Increased anion gap 04/21/2021    Lumbar spondylitis (HCC) 10/13/2022    Metabolic acidosis, increased anion gap 04/21/2021    Nasal bone fracture 10/10/2022    Prostate CA (HCC)     S/P CABG x 3     2004    Sleep apnea    [2]   Past Surgical History:  Procedure Laterality Date    CARDIAC CATHETERIZATION      2 stents    CORONARY ARTERY BYPASS GRAFT      CORONARY ARTERY BYPASS GRAFT  2004    TX ARTHRD ANT INTERBODY MIN DSC CRV BELOW C2 N/A 12/16/2020    Procedure: Anterior cervical discectomy with  "fusion C4-C7; Posterior cervical decompression and fusion C2-T2;  Surgeon: David Rowell MD;  Location: [facility];  Service: Neurosurgery    TONSILLECTOMY     [3]   Family History  Problem Relation Name Age of Onset    Diabetes Mother      Uterine cancer Mother      COPD Father      Hypertension Father     [4]   Social History  Tobacco Use    Smoking status: Every Day     Current packs/day: 1.50     Average packs/day: 1.5 packs/day for 40.0 years (60.0 ttl pk-yrs)     Types: Cigarettes    Smokeless tobacco: Never   Vaping Use    Vaping status: Never Used   Substance Use Topics    Alcohol use: Yes     Alcohol/week: 4.0 standard drinks of alcohol     Types: 4 Standard drinks or equivalent per week     Comment: \"quart a day\"    Drug use: No        Gilberto North DO  07/19/25 0704    "

## 2025-07-20 ENCOUNTER — APPOINTMENT (EMERGENCY)
Dept: RADIOLOGY | Facility: HOSPITAL | Age: 65
End: 2025-07-20
Payer: COMMERCIAL

## 2025-07-20 ENCOUNTER — HOSPITAL ENCOUNTER (EMERGENCY)
Facility: HOSPITAL | Age: 65
Discharge: HOME | End: 2025-07-20
Payer: COMMERCIAL

## 2025-07-20 VITALS
RESPIRATION RATE: 18 BRPM | DIASTOLIC BLOOD PRESSURE: 62 MMHG | TEMPERATURE: 98.6 F | SYSTOLIC BLOOD PRESSURE: 100 MMHG | OXYGEN SATURATION: 92 % | HEART RATE: 110 BPM

## 2025-07-20 DIAGNOSIS — R10.9 ABDOMINAL PAIN: ICD-10-CM
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
## 2025-07-20 DIAGNOSIS — S09.90XA INJURY OF HEAD, INITIAL ENCOUNTER: ICD-10-CM

## 2025-07-20 DIAGNOSIS — F10.929 ALCOHOL INTOXICATION (HCC): ICD-10-CM

## 2025-07-20 LAB
ANION GAP SERPL CALCULATED.3IONS-SCNC: 10 MMOL/L (ref 4–13)
BASOPHILS # BLD AUTO: 0.09 THOUSANDS/ÂΜL (ref 0–0.1)
BASOPHILS NFR BLD AUTO: 2 % (ref 0–1)
BUN SERPL-MCNC: 11 MG/DL (ref 5–25)
CALCIUM SERPL-MCNC: 7.5 MG/DL (ref 8.4–10.2)
CHLORIDE SERPL-SCNC: 106 MMOL/L (ref 96–108)
CO2 SERPL-SCNC: 25 MMOL/L (ref 21–32)
CREAT SERPL-MCNC: 1 MG/DL (ref 0.6–1.3)
EOSINOPHIL # BLD AUTO: 0.09 THOUSAND/ÂΜL (ref 0–0.61)
EOSINOPHIL NFR BLD AUTO: 2 % (ref 0–6)
ERYTHROCYTE [DISTWIDTH] IN BLOOD BY AUTOMATED COUNT: 19.2 % (ref 11.6–15.1)
GFR SERPL CREATININE-BSD FRML MDRD: 79 ML/MIN/1.73SQ M
GLUCOSE SERPL-MCNC: 115 MG/DL (ref 65–140)
HCT VFR BLD AUTO: 29.4 % (ref 36.5–49.3)
HGB BLD-MCNC: 9.4 G/DL (ref 12–17)
IMM GRANULOCYTES # BLD AUTO: 0.02 THOUSAND/UL (ref 0–0.2)
IMM GRANULOCYTES NFR BLD AUTO: 1 % (ref 0–2)
LYMPHOCYTES # BLD AUTO: 1.22 THOUSANDS/ÂΜL (ref 0.6–4.47)
LYMPHOCYTES NFR BLD AUTO: 31 % (ref 14–44)
MCH RBC QN AUTO: 31 PG (ref 26.8–34.3)
MCHC RBC AUTO-ENTMCNC: 32 G/DL (ref 31.4–37.4)
MCV RBC AUTO: 97 FL (ref 82–98)
MONOCYTES # BLD AUTO: 0.52 THOUSAND/ÂΜL (ref 0.17–1.22)
MONOCYTES NFR BLD AUTO: 13 % (ref 4–12)
NEUTROPHILS # BLD AUTO: 2.03 THOUSANDS/ÂΜL (ref 1.85–7.62)
NEUTS SEG NFR BLD AUTO: 51 % (ref 43–75)
NRBC BLD AUTO-RTO: 1 /100 WBCS
PLATELET # BLD AUTO: 79 THOUSANDS/UL (ref 149–390)
PMV BLD AUTO: 10.4 FL (ref 8.9–12.7)
POTASSIUM SERPL-SCNC: 4 MMOL/L (ref 3.5–5.3)
RBC # BLD AUTO: 3.03 MILLION/UL (ref 3.88–5.62)
SODIUM SERPL-SCNC: 141 MMOL/L (ref 135–147)
WBC # BLD AUTO: 3.97 THOUSAND/UL (ref 4.31–10.16)

## 2025-07-20 PROCEDURE — 99284 EMERGENCY DEPT VISIT MOD MDM: CPT

## 2025-07-20 PROCEDURE — 36415 COLL VENOUS BLD VENIPUNCTURE: CPT

## 2025-07-20 PROCEDURE — 80048 BASIC METABOLIC PNL TOTAL CA: CPT

## 2025-07-20 PROCEDURE — 85025 COMPLETE CBC W/AUTO DIFF WBC: CPT

## 2025-07-20 PROCEDURE — 99285 EMERGENCY DEPT VISIT HI MDM: CPT

## 2025-07-20 PROCEDURE — 72125 CT NECK SPINE W/O DYE: CPT

## 2025-07-20 PROCEDURE — 74177 CT ABD & PELVIS W/CONTRAST: CPT

## 2025-07-20 PROCEDURE — 70450 CT HEAD/BRAIN W/O DYE: CPT

## 2025-07-20 RX ADMIN — IOHEXOL 100 ML: 350 INJECTION, SOLUTION INTRAVENOUS at 02:15

## 2025-07-20 NOTE — DISCHARGE INSTRUCTIONS
You were seen in the Emergency Department today for a fall.    Please follow up with your primary care doctor.  Please return to the Emergency Department if you experience worsening of your current symptoms or any other concerning symptoms.

## 2025-07-20 NOTE — ED PROVIDER NOTES
Time reflects when diagnosis was documented in both MDM as applicable and the Disposition within this note       Time User Action Codes Description Comment    7/20/2025  2:21 AM Ursula Deutsch Add [W19.XXXA] Fall, initial encounter     7/20/2025  2:21 AM Ursula Deutsch [S09.90XA] Injury of head, initial encounter     7/20/2025  2:21 AM Ursula Deutsch Add [R10.9] Abdominal pain     7/20/2025  2:22 AM Ursula Deutsch [F10.929] Alcohol intoxication (HCC)           ED Disposition       ED Disposition   Discharge    Condition   Stable    Date/Time   Sun Jul 20, 2025  7:02 AM    Comment   Gregg Salcedotoi discharge to home/self care.                   Assessment & Plan       Medical Decision Making  Amount and/or Complexity of Data Reviewed  Labs: ordered. Decision-making details documented in ED Course.  Radiology: ordered.    Risk  Prescription drug management.        Patient is a 65-year-old male with history of alcohol use disorder who presents for evaluation after a fall.  On exam, patient in no acute distress.  He is in a c-collar.  He has tachycardic to the 110s.  Differential includes skull fracture, ICH, cervical spinal fracture or malalignment, or intra- abdominal bleeding.  Will order CBC, BMP CT head, CT C-spine, CT abdomen pelvis.    Hemoglobin is stable.  CT scans pending.  Patient signed out to oncoming doctor.  Dispo per CT scans.    ED Course as of 07/20/25 2214   Sun Jul 20, 2025   0055 Hemoglobin(!): 9.4  Stable       Medications   iohexol (OMNIPAQUE) 350 MG/ML injection (MULTI-DOSE) 100 mL (100 mL Intravenous Given 7/20/25 0215)       ED Risk Strat Scores                    No data recorded        SBIRT 22yo+      Flowsheet Row Most Recent Value   Initial Alcohol Screen: US AUDIT-C     1. How often do you have a drink containing alcohol? 6 Filed at: 07/20/2025 0029   2. How many drinks containing alcohol do you have on a typical day you are drinking?  5 Filed at: 07/20/2025 0029   3a. Male UNDER 65: How  often do you have five or more drinks on one occasion? 6 Filed at: 07/20/2025 0029   Audit-C Score 17 Filed at: 07/20/2025 0029   Full Alcohol Screen: US AUDIT    4. How often during the last year have you found that you were not able to stop drinking once you had started? 4 Filed at: 07/20/2025 0029   5. How often during past year have you failed to do what was normally expected of you because of drinking?  4 Filed at: 07/20/2025 0029   6. How often in past year have you needed a first drink in the morning to get yourself going after a heavy drinking session?  4 Filed at: 07/20/2025 0029   7. How often in past year have you had feeling of guilt or remorse after drinking?  4 Filed at: 07/20/2025 0029   8. How often in past year have you been unable to remember what happened night before because you had been drinking?  4 Filed at: 07/20/2025 0029   9. Have you or someone else been injured as a result of your drinking?  4 Filed at: 07/20/2025 0029   10. Has a relative, friend, doctor or other health worker been concerned about your drinking and suggested you cut down?  4 Filed at: 07/20/2025 0029   AUDIT Total Score 45 Filed at: 07/20/2025 0029   BRIGIDA: How many times in the past year have you...    Used an illegal drug or used a prescription medication for non-medical reasons? Never Filed at: 07/20/2025 0029                            History of Present Illness       Chief Complaint   Patient presents with    Fall     Pt present to the ED via EMS with c/o not feeling well . Pt has a witnessed trip and fell  when EMS was getting pt out of home  . Positive head struck  no LOC . Pt on thinners       Past Medical History[1]   Past Surgical History[2]   Family History[3]   Social History[4]   E-Cigarette/Vaping    E-Cigarette Use Never User       E-Cigarette/Vaping Substances    Nicotine Yes     THC No     CBD No     Flavoring No     Other No     Unknown No       I have reviewed and agree with the history as documented.      HPI    Patient is a 65-year-old male with history of alcohol use disorder and A-fib who presents for evaluation after a fall.  Per EMS, the patient called EMS because he was not feeling well and when they got to the door he tripped and fell striking his head.  Patient is intoxicated.  He is complaining of abdominal pain that occurred. He is asking for a sandwich.     Review of Systems   Unable to perform ROS: Other (Intoxicated)           Objective       ED Triage Vitals [07/20/25 0027]   Temperature Pulse Blood Pressure Respirations SpO2 Patient Position - Orthostatic VS   98.6 °F (37 °C) (!) 110 100/62 18 92 % Lying      Temp Source Heart Rate Source BP Location FiO2 (%) Pain Score    Oral Monitor Right arm -- 4      Vitals      Date and Time Temp Pulse SpO2 Resp BP Pain Score FACES Pain Rating User   07/20/25 0027 98.6 °F (37 °C) 110 92 % 18 100/62 4 -- LS            Physical Exam  Vitals and nursing note reviewed.   HENT:      Head: Normocephalic and atraumatic.     Eyes:      Extraocular Movements: Extraocular movements intact.      Pupils: Pupils are equal, round, and reactive to light.     Neck:      Comments: C-collar   Cardiovascular:      Rate and Rhythm: Tachycardia present. Rhythm irregular.   Pulmonary:      Effort: Pulmonary effort is normal. No respiratory distress.   Abdominal:      Palpations: Abdomen is soft.      Comments: Mild diffuse tenderness. Scattered bruising on abdomen.      Musculoskeletal:         General: Normal range of motion.     Skin:     General: Skin is warm and dry.     Neurological:      Mental Status: He is alert. Mental status is at baseline.      Comments: Intoxicated       Results Reviewed       Procedure Component Value Units Date/Time    Basic metabolic panel [[id number]]  (Abnormal) Collected: 07/20/25 0045    Lab Status: Final result Specimen: Blood from Arm, Right Updated: 07/20/25 0109     Sodium 141 mmol/L      Potassium 4.0 mmol/L      Chloride 106 mmol/L      CO2  25 mmol/L      ANION GAP 10 mmol/L      BUN 11 mg/dL      Creatinine 1.00 mg/dL      Glucose 115 mg/dL      Calcium 7.5 mg/dL      eGFR 79 ml/min/1.73sq m     Narrative:      [employer] guidelines for Chronic Kidney Disease (CKD):     Stage 1 with normal or high GFR (GFR > 90 mL/min/1.73 square meters)    Stage 2 Mild CKD (GFR = 60-89 mL/min/1.73 square meters)    Stage 3A Moderate CKD (GFR = 45-59 mL/min/1.73 square meters)    Stage 3B Moderate CKD (GFR = 30-44 mL/min/1.73 square meters)    Stage 4 Severe CKD (GFR = 15-29 mL/min/1.73 square meters)    Stage 5 End Stage CKD (GFR <15 mL/min/1.73 square meters)  Note: GFR calculation is accurate only with a steady state creatinine    CBC and differential [[id number]]  (Abnormal) Collected: 07/20/25 0041    Lab Status: Final result Specimen: Blood from Arm, Left Updated: 07/20/25 0054     WBC 3.97 Thousand/uL      RBC 3.03 Million/uL      Hemoglobin 9.4 g/dL      Hematocrit 29.4 %      MCV 97 fL      MCH 31.0 pg      MCHC 32.0 g/dL      RDW 19.2 %      MPV 10.4 fL      Platelets 79 Thousands/uL      nRBC 1 /100 WBCs      Segmented % 51 %      Immature Grans % 1 %      Lymphocytes % 31 %      Monocytes % 13 %      Eosinophils Relative 2 %      Basophils Relative 2 %      Absolute Neutrophils 2.03 Thousands/µL      Absolute Immature Grans 0.02 Thousand/uL      Absolute Lymphocytes 1.22 Thousands/µL      Absolute Monocytes 0.52 Thousand/µL      Eosinophils Absolute 0.09 Thousand/µL      Basophils Absolute 0.09 Thousands/µL             CT abdomen pelvis with contrast   Final Interpretation by Nereyda Sotelo MD (07/20 0311)      No findings of acute traumatic injury in the abdomen or pelvis.      Colonic diverticulosis without acute diverticulitis.      Hepatic steatosis.      Small left pleural effusion.            Workstation performed: [id number]         CT spine cervical without contrast   Final Interpretation by Nereyda Sotelo MD (07/20  0313)      No acute cervical spine fracture or traumatic malalignment.      Extensive postsurgical changes of the cervical spine, similar to the prior study.      Partially imaged left pleural effusion.            Workstation performed: [id number]         CT head wo contrast   Final Interpretation by Nereyda Sotelo MD (07/20 0312)      No acute intracranial abnormality.                  Workstation performed: [id number]             Procedures    ED Medication and Procedure Management   Prior to Admission Medications   Prescriptions Last Dose Informant Patient Reported? Taking?   Blood Glucose Monitoring Suppl (ONE TOUCH ULTRA MINI) w/Device KIT  Self Yes No   Sig: Use as directed   Patient not taking: Reported on 6/5/2025   Blood Pressure Monitoring (Adult Blood Pressure Cuff Lg) KIT   No No   Sig: Use in the morning   Patient not taking: Reported on 6/5/2025   ONETOUCH DELICA LANCETS FINE MISC  Self Yes No   Sig: in the morning and in the evening and before bedtime. Test.   Patient not taking: Reported on 6/5/2025   Thiamine Mononitrate (VITAMIN B1) 100 mg tablet   No No   Sig: Take 1 tablet (100 mg total) by mouth in the morning.   albuterol (2.5 mg/3 mL) 0.083 % nebulizer solution   No No   Sig: Take 3 mL (2.5 mg total) by nebulization 3 (three) times a day   albuterol (PROVENTIL HFA,VENTOLIN HFA) 90 mcg/act inhaler   No No   Sig: Inhale 2 puffs every 6 (six) hours as needed for wheezing or shortness of breath   apixaban (Eliquis) 5 mg   No No   Sig: Take 1 tablet (5 mg total) by mouth in the morning and 1 tablet (5 mg total) in the evening.   aspirin 81 mg chewable tablet   No No   Sig: Chew 1 tablet (81 mg total) in the morning.   atorvastatin (LIPITOR) 40 mg tablet   No No   Sig: Take 1 tablet (40 mg total) by mouth in the morning.   cephalexin (KEFLEX) 500 mg capsule   No No   Sig: Take 1 capsule (500 mg total) by mouth every 6 (six) hours for 7 days   dextromethorphan-guaiFENesin (ROBITUSSIN DM)   mg/5 mL syrup   No No   Sig: Take 5 mL by mouth 3 (three) times a day as needed for cough   diltiazem (CARDIZEM SR) 120 mg 12 hr capsule   No No   Sig: Take 1 capsule (120 mg total) by mouth in the morning and 1 capsule (120 mg total) in the evening.   ferrous sulfate 325 (65 Fe) mg tablet   No No   Sig: Take 1 tablet (325 mg total) by mouth daily with breakfast   fluticasone-vilanterol 200-25 mcg/actuation inhaler   No No   Sig: Inhale 1 puff in the morning. Rinse mouth after use.   folic acid (FOLVITE) 400 mcg tablet   No No   Sig: Take 1 tablet (400 mcg total) by mouth in the morning.   gabapentin (NEURONTIN) 300 mg capsule   No No   Sig: Take 1 capsule (300 mg total) by mouth in the morning and 1 capsule (300 mg total) in the evening and 1 capsule (300 mg total) before bedtime.   metoprolol succinate (TOPROL-XL) 100 mg 24 hr tablet   No No   Sig: Take 1 tablet (100 mg total) by mouth daily   pantoprazole (PROTONIX) 40 mg tablet   No No   Sig: Take 1 tablet (40 mg total) by mouth in the morning.   ranolazine (RANEXA) 500 mg 12 hr tablet   No No   Sig: Take 1 tablet (500 mg total) by mouth in the morning and 1 tablet (500 mg total) in the evening.   saccharomyces boulardii (FLORASTOR) 250 mg capsule   No No   Sig: Take 1 capsule (250 mg total) by mouth in the morning and 1 capsule (250 mg total) in the evening.   tamsulosin (FLOMAX) 0.4 mg   No No   Sig: Take 1 capsule (0.4 mg total) by mouth daily with dinner   torsemide (DEMADEX) 10 mg tablet   No No   Sig: Take 1 tablet (10 mg total) by mouth every other day      Facility-Administered Medications: None     Discharge Medication List as of 7/20/2025  7:02 AM        CONTINUE these medications which have NOT CHANGED    Details   albuterol (2.5 mg/3 mL) 0.083 % nebulizer solution Take 3 mL (2.5 mg total) by nebulization 3 (three) times a day, Starting Thu 6/12/2025, Normal      albuterol (PROVENTIL HFA,VENTOLIN HFA) 90 mcg/act inhaler Inhale 2 puffs every 6 (six)  hours as needed for wheezing or shortness of breath, Starting Thu 6/12/2025, Normal      apixaban (Eliquis) 5 mg Take 1 tablet (5 mg total) by mouth in the morning and 1 tablet (5 mg total) in the evening., Starting Thu 6/12/2025, Normal      aspirin 81 mg chewable tablet Chew 1 tablet (81 mg total) in the morning., Starting Thu 6/12/2025, Normal      atorvastatin (LIPITOR) 40 mg tablet Take 1 tablet (40 mg total) by mouth in the morning., Starting Thu 6/12/2025, Normal      Blood Glucose Monitoring Suppl (ONE TOUCH ULTRA MINI) w/Device KIT Use as directed, Starting Thu 5/16/2019, Historical Med      Blood Pressure Monitoring (Adult Blood Pressure Cuff Lg) KIT Use in the morning, Starting Thu 12/14/2023, Normal      cephalexin (KEFLEX) 500 mg capsule Take 1 capsule (500 mg total) by mouth every 6 (six) hours for 7 days, Starting Sun 7/13/2025, Until Sun 7/20/2025, Normal      dextromethorphan-guaiFENesin (ROBITUSSIN DM)  mg/5 mL syrup Take 5 mL by mouth 3 (three) times a day as needed for cough, Starting Mon 7/14/2025, Normal      diltiazem (CARDIZEM SR) 120 mg 12 hr capsule Take 1 capsule (120 mg total) by mouth in the morning and 1 capsule (120 mg total) in the evening., Starting Thu 6/12/2025, Normal      ferrous sulfate 325 (65 Fe) mg tablet Take 1 tablet (325 mg total) by mouth daily with breakfast, Starting Thu 6/12/2025, Normal      fluticasone-vilanterol 200-25 mcg/actuation inhaler Inhale 1 puff in the morning. Rinse mouth after use., Starting Thu 6/12/2025, Normal      folic acid (FOLVITE) 400 mcg tablet Take 1 tablet (400 mcg total) by mouth in the morning., Starting Thu 6/12/2025, Normal      gabapentin (NEURONTIN) 300 mg capsule Take 1 capsule (300 mg total) by mouth in the morning and 1 capsule (300 mg total) in the evening and 1 capsule (300 mg total) before bedtime., Starting Thu 6/12/2025, Normal      metoprolol succinate (TOPROL-XL) 100 mg 24 hr tablet Take 1 tablet (100 mg total) by mouth  daily, Starting u 6/12/2025, Normal      ONETOUCH DELICA LANCETS FINE MISC in the morning and in the evening and before bedtime. Test., Starting Thu 5/16/2019, Historical Med      pantoprazole (PROTONIX) 40 mg tablet Take 1 tablet (40 mg total) by mouth in the morning., Starting Thu 6/12/2025, Normal      ranolazine (RANEXA) 500 mg 12 hr tablet Take 1 tablet (500 mg total) by mouth in the morning and 1 tablet (500 mg total) in the evening., Starting Thu 6/12/2025, Normal      saccharomyces boulardii (FLORASTOR) 250 mg capsule Take 1 capsule (250 mg total) by mouth in the morning and 1 capsule (250 mg total) in the evening., Starting Thu 6/12/2025, Normal      tamsulosin (FLOMAX) 0.4 mg Take 1 capsule (0.4 mg total) by mouth daily with dinner, Starting Thu 6/12/2025, Normal      Thiamine Mononitrate (VITAMIN B1) 100 mg tablet Take 1 tablet (100 mg total) by mouth in the morning., Starting Thu 6/12/2025, No Print      torsemide (DEMADEX) 10 mg tablet Take 1 tablet (10 mg total) by mouth every other day, Starting u 6/12/2025, Until Sat 7/12/2025, Normal           No discharge procedures on file.  ED SEPSIS DOCUMENTATION   Time reflects when diagnosis was documented in both MDM as applicable and the Disposition within this note       Time User Action Codes Description Comment    7/20/2025  2:21 AM Ursula Deutsch [W19.XXXA] Fall, initial encounter     7/20/2025  2:21 AM Ursula Deutsch [S09.90XA] Injury of head, initial encounter     7/20/2025  2:21 AM Ursula Deutsch [R10.9] Abdominal pain     7/20/2025  2:22 AM Ursula Deutsch [F10.929] Alcohol intoxication (HCC)                      [1]   Past Medical History:  Diagnosis Date    Acute encephalopathy 06/07/2025    Acute on chronic kidney failure  (HCC)     Alcohol abuse     Alcohol withdrawal (HCC) 10/09/2018    Atrial fibrillation (HCC)     Cancer (HCC)     prostate ca,had radiation    Cardiac disease     stents,then triple bypass    COPD (chronic obstructive  "pulmonary disease) ([facility])     Coronary artery disease     Elevated troponin 09/28/2023    ETOH abuse     Heart failure ([facility])     History of heart surgery     says [facility] bypass [facility]    Hx of heart artery stent     2014    Hyperlipidemia     Hypertension     Hypoglycemia 06/05/2025    Hyponatremia 10/17/2019    Hypovolemic shock ([facility]) 12/22/2019    Increased anion gap 04/21/2021    Lumbar spondylitis ([facility]) 10/13/2022    Metabolic acidosis, increased anion gap 04/21/2021    Nasal bone fracture 10/10/2022    Prostate CA ([facility])     S/P CABG x 3     2004    Sleep apnea    [2]   Past Surgical History:  Procedure Laterality Date    CARDIAC CATHETERIZATION      2 stents    CORONARY ARTERY BYPASS GRAFT      CORONARY ARTERY BYPASS GRAFT  2004    [facility] ARTHRD ANT INTERBODY MIN DSC CRV BELOW C2 N/A 12/16/2020    Procedure: Anterior cervical discectomy with fusion C4-C7; Posterior cervical decompression and fusion C2-T2;  Surgeon: David Rowell MD;  Location: [facility];  Service: Neurosurgery    TONSILLECTOMY     [3]   Family History  Problem Relation Name Age of Onset    Diabetes Mother      Uterine cancer Mother      COPD Father      Hypertension Father     [4]   Social History  Tobacco Use    Smoking status: Every Day     Current packs/day: 1.50     Average packs/day: 1.5 packs/day for 40.0 years (60.0 ttl pk-yrs)     Types: Cigarettes    Smokeless tobacco: Never   Vaping Use    Vaping status: Never Used   Substance Use Topics    Alcohol use: Yes     Alcohol/week: 4.0 standard drinks of alcohol     Types: 4 Standard drinks or equivalent per week     Comment: \"quart a day\"    Drug use: No        Ursula Deutsch MD  07/20/25 7128    "

## 2025-07-30 ENCOUNTER — HOSPITAL ENCOUNTER (EMERGENCY)
Facility: HOSPITAL | Age: 65
Discharge: HOME | DRG: 309 | End: 2025-07-30
Attending: EMERGENCY MEDICINE
Payer: COMMERCIAL

## 2025-08-01 ENCOUNTER — APPOINTMENT (EMERGENCY)
Dept: RADIOLOGY | Facility: HOSPITAL | Age: 65
DRG: 309 | End: 2025-08-01
Payer: COMMERCIAL

## 2025-08-01 ENCOUNTER — HOSPITAL ENCOUNTER (INPATIENT)
Facility: HOSPITAL | Age: 65
LOS: 2 days | Discharge: HOME | DRG: 309 | End: 2025-08-03
Admitting: INTERNAL MEDICINE
Payer: COMMERCIAL

## 2025-08-01 ENCOUNTER — PATIENT OUTREACH (OUTPATIENT)
Age: 65
End: 2025-08-01

## 2025-08-04 ENCOUNTER — HOSPITAL ENCOUNTER (EMERGENCY)
Facility: HOSPITAL | Age: 65
Discharge: HOME | End: 2025-08-04
Attending: EMERGENCY MEDICINE | Admitting: EMERGENCY MEDICINE
Payer: COMMERCIAL

## 2025-08-04 ENCOUNTER — HOSPITAL ENCOUNTER (EMERGENCY)
Facility: HOSPITAL | Age: 65
Discharge: HOME | End: 2025-08-05
Attending: EMERGENCY MEDICINE | Admitting: EMERGENCY MEDICINE
Payer: COMMERCIAL

## 2025-08-06 ENCOUNTER — HOSPITAL ENCOUNTER (EMERGENCY)
Facility: HOSPITAL | Age: 65
Discharge: HOME | End: 2025-08-07
Attending: EMERGENCY MEDICINE
Payer: COMMERCIAL

## 2025-08-06 ENCOUNTER — APPOINTMENT (EMERGENCY)
Dept: RADIOLOGY | Facility: HOSPITAL | Age: 65
End: 2025-08-06
Payer: COMMERCIAL

## 2025-08-06 PROBLEM — S01.01XA SCALP LACERATION: Status: ACTIVE | Noted: 2025-08-06

## 2025-08-06 PROBLEM — I48.91 ATRIAL FIBRILLATION (HCC): Status: ACTIVE | Noted: 2025-08-06
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
## 2025-08-07 ENCOUNTER — APPOINTMENT (EMERGENCY)
Dept: RADIOLOGY | Facility: HOSPITAL | Age: 65
End: 2025-08-07
Payer: COMMERCIAL

## 2025-08-08 ENCOUNTER — HOSPITAL ENCOUNTER (EMERGENCY)
Facility: HOSPITAL | Age: 65
Discharge: HOME | End: 2025-08-08
Admitting: EMERGENCY MEDICINE
Payer: COMMERCIAL

## 2025-08-08 ENCOUNTER — HOSPITAL ENCOUNTER (EMERGENCY)
Facility: HOSPITAL | Age: 65
Discharge: HOME | End: 2025-08-08
Attending: EMERGENCY MEDICINE
Payer: COMMERCIAL

## 2025-08-08 ENCOUNTER — APPOINTMENT (EMERGENCY)
Dept: RADIOLOGY | Facility: HOSPITAL | Age: 65
End: 2025-08-08
Payer: COMMERCIAL

## 2025-08-08 VITALS
OXYGEN SATURATION: 98 % | SYSTOLIC BLOOD PRESSURE: 84 MMHG | TEMPERATURE: 97.5 F | HEART RATE: 103 BPM | RESPIRATION RATE: 20 BRPM | DIASTOLIC BLOOD PRESSURE: 52 MMHG

## 2025-08-08 DIAGNOSIS — F10.929 ALCOHOL INTOXICATION (HCC): Primary | ICD-10-CM

## 2025-08-08 DIAGNOSIS — R51.9 HEADACHE: ICD-10-CM

## 2025-08-08 PROCEDURE — 99284 EMERGENCY DEPT VISIT MOD MDM: CPT | Performed by: EMERGENCY MEDICINE

## 2025-08-08 PROCEDURE — 99283 EMERGENCY DEPT VISIT LOW MDM: CPT

## 2025-08-09 PROBLEM — R29.6 MULTIPLE FALLS: Status: ACTIVE | Noted: 2025-08-09

## 2025-08-10 PROBLEM — E78.5 HLD (HYPERLIPIDEMIA): Status: ACTIVE | Noted: 2025-08-10

## 2025-08-10 PROBLEM — I48.19 PERSISTENT ATRIAL FIBRILLATION WITH RVR (HCC): Status: ACTIVE | Noted: 2025-08-10

## 2025-08-10 PROBLEM — F10.10 ALCOHOL ABUSE: Status: ACTIVE | Noted: 2025-08-10

## 2025-08-10 PROBLEM — I50.32 CHRONIC DIASTOLIC CHF (CONGESTIVE HEART FAILURE) (HCC): Status: ACTIVE | Noted: 2025-08-10

## 2025-08-10 PROBLEM — S01.91XA LACERATION OF HEAD: Status: ACTIVE | Noted: 2025-08-10

## 2025-08-10 PROBLEM — Z95.1 HX OF CABG: Status: ACTIVE | Noted: 2025-08-10

## 2025-08-10 PROBLEM — F10.930 ALCOHOL WITHDRAWAL SYNDROME WITHOUT COMPLICATION (HCC): Status: ACTIVE | Noted: 2025-08-10

## 2025-08-11 PROBLEM — F10.90 ALCOHOL USE DISORDER: Status: ACTIVE | Noted: 2025-08-10

## 2025-08-12 ENCOUNTER — HOSPITAL ENCOUNTER (INPATIENT)
Facility: HOSPITAL | Age: 65
LOS: 1 days | Discharge: LEFT BEFORE DISCHARGE | DRG: 640 | End: 2025-08-14
Attending: STUDENT IN AN ORGANIZED HEALTH CARE EDUCATION/TRAINING PROGRAM | Admitting: INTERNAL MEDICINE
Payer: COMMERCIAL

## 2025-08-12 ENCOUNTER — APPOINTMENT (EMERGENCY)
Dept: RADIOLOGY | Facility: HOSPITAL | Age: 65
DRG: 640 | End: 2025-08-12
Payer: COMMERCIAL

## 2025-08-12 PROBLEM — A41.9 SEPTIC SHOCK (HCC): Chronic | Status: ACTIVE | Noted: 2025-08-12

## 2025-08-12 PROBLEM — R65.21 SEPTIC SHOCK (HCC): Status: ACTIVE | Noted: 2025-08-12

## 2025-08-12 PROBLEM — R65.21 SEPTIC SHOCK (HCC): Chronic | Status: ACTIVE | Noted: 2025-08-12

## 2025-08-12 PROBLEM — A41.9 SEPTIC SHOCK (HCC): Status: ACTIVE | Noted: 2025-08-12

## 2025-08-13 ENCOUNTER — APPOINTMENT (OUTPATIENT)
Dept: NON INVASIVE DIAGNOSTICS | Facility: HOSPITAL | Age: 65
DRG: 640 | End: 2025-08-13
Payer: COMMERCIAL

## 2025-08-13 ENCOUNTER — RESULTS FOLLOW-UP (OUTPATIENT)
Age: 65
End: 2025-08-13

## 2025-08-13 PROBLEM — R57.9 SHOCK (HCC): Status: ACTIVE | Noted: 2025-08-12

## 2025-08-14 ENCOUNTER — HOSPITAL ENCOUNTER (EMERGENCY)
Facility: HOSPITAL | Age: 65
Discharge: HOME | End: 2025-08-14
Attending: STUDENT IN AN ORGANIZED HEALTH CARE EDUCATION/TRAINING PROGRAM
Payer: COMMERCIAL

## 2025-08-21 PROBLEM — F10.929 ALCOHOL INTOXICATION (HCC): Status: ACTIVE | Noted: 2025-08-21
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
## (undated) DEVICE — BETHLEHEM UNIVERSAL SPINE, KIT: Brand: CARDINAL HEALTH

## (undated) DEVICE — GLOVE SRG BIOGEL ECLIPSE 7

## (undated) DEVICE — JP 3-SPRING RES W/10FR PVC DRAIN/TR: Brand: CARDINAL HEALTH

## (undated) DEVICE — 3M™ STERI-STRIP™ REINFORCED ADHESIVE SKIN CLOSURES, R1547, 1/2 IN X 4 IN (12 MM X 100 MM), 6 STRIPS/ENVELOPE: Brand: 3M™ STERI-STRIP™

## (undated) DEVICE — NEURO SURGICAL CLIPPER BLADE

## (undated) DEVICE — MINOR PROCEDURE DRAPE: Brand: CONVERTORS

## (undated) DEVICE — SUT MONOCRYL PLUS 4-0 PS-2 18 IN MCP496G

## (undated) DEVICE — NEEDLE 22 G X 1 1/2 SAFETY

## (undated) DEVICE — ROSEBUD DISSECTORS: Brand: DEROYAL

## (undated) DEVICE — MAYFIELD® DISPOSABLE ADULT SKULL PIN (PLASTIC BASE): Brand: MAYFIELD®

## (undated) DEVICE — INTENDED FOR TISSUE SEPARATION, AND OTHER PROCEDURES THAT REQUIRE A SHARP SURGICAL BLADE TO PUNCTURE OR CUT.: Brand: BARD-PARKER SAFETY BLADES SIZE 10, STERILE

## (undated) DEVICE — BOWL ASSY BM210 DUAL BLADE DISPOSABLE: Brand: MIDAS REX™

## (undated) DEVICE — TAP 3MM YUKON

## (undated) DEVICE — ANTIBACTERIAL VIOLET BRAIDED (POLYGLACTIN 910), SYNTHETIC ABSORBABLE SUTURE: Brand: COATED VICRYL

## (undated) DEVICE — 3M™ TEGADERM™ TRANSPARENT FILM DRESSING FRAME STYLE, 1626W, 4 IN X 4-3/4 IN (10 CM X 12 CM), 50/CT 4CT/CASE: Brand: 3M™ TEGADERM™

## (undated) DEVICE — DRILL 2.3MM ADJ YUKON

## (undated) DEVICE — ADHESIVE SKIN HIGH VISCOSITY EXOFIN 1ML

## (undated) DEVICE — 3M™ IOBAN™ 2 ANTIMICROBIAL INCISE DRAPE 6650EZ: Brand: IOBAN™ 2

## (undated) DEVICE — SNAP KOVER: Brand: UNBRANDED

## (undated) DEVICE — BIPOLAR SEALER 23-113-1 AQM 2.3: Brand: AQUAMANTYS™

## (undated) DEVICE — SUT ETHILON 3-0 FS-1 18 IN 663G

## (undated) DEVICE — MONITORING SPINAL IMPULSE CASE FEE

## (undated) DEVICE — SUPPLY FEE STD

## (undated) DEVICE — CHLORAPREP HI-LITE 26ML ORANGE

## (undated) DEVICE — TOOL 14BA20 LEGEND 14CM 2MM BA: Brand: MIDAS REX ™

## (undated) DEVICE — NEEDLE SPINAL18G X 3.5 IN QUINCKE

## (undated) DEVICE — DRAPE SHEET X-LG

## (undated) DEVICE — PENCIL ELECTROSURG E-Z CLEAN -0035H

## (undated) DEVICE — TOOL 14MH30 LEGEND 14CM 3MM: Brand: MIDAS REX ™

## (undated) DEVICE — TELFA NON-ADHERENT ABSORBENT DRESSING: Brand: TELFA

## (undated) DEVICE — BIPOLAR CORD DISP

## (undated) DEVICE — DRAPE ADOLESCENT LAPAROTOMY

## (undated) DEVICE — TRAY FOLEY 16FR URIMETER SURESTEP

## (undated) DEVICE — DRAPE C-ARMOUR

## (undated) DEVICE — DISTRACTION SCREW 12MM DISP

## (undated) DEVICE — DRAPE MICROSCOPE OPMI PENTERO

## (undated) DEVICE — DRAPE SURGIKIT SADDLE BAG

## (undated) DEVICE — BETADINE OINTMENT FOIL PACK

## (undated) DEVICE — TUBING SUCTION 5MM X 12 FT

## (undated) DEVICE — SUT VICRYL PLUS 3-0 RB-1 CR/8 18 IN VCP713D

## (undated) DEVICE — DRAPE TOWEL: Brand: CONVERTORS

## (undated) DEVICE — HEMOSTATIC MATRIX SURGIFLO 8ML W/THROMBIN

## (undated) DEVICE — DRESSING MEPILEX AG BORDER 4 X 10 IN

## (undated) DEVICE — ELECTRODE BLADE MOD E-Z CLEAN 2.5IN 6.4CM -0012M

## (undated) DEVICE — INTENDED FOR TISSUE SEPARATION, AND OTHER PROCEDURES THAT REQUIRE A SHARP SURGICAL BLADE TO PUNCTURE OR CUT.: Brand: BARD-PARKER ® CARBON RIB-BACK BLADES

## (undated) DEVICE — GLOVE INDICATOR PI UNDERGLOVE SZ 7.5 BLUE

## (undated) DEVICE — PROXIMATE PLUS MD MULTI-DIRECTIONAL RELEASE SKIN STAPLERS CONTAINS 35 STAINLESS STEEL STAPLES APPROXIMATE CLOSED DIMENSIONS: 6.9MM X 3.9MM WIDE: Brand: PROXIMATE

## (undated) DEVICE — DRAPE SHEET THREE QUARTER

## (undated) DEVICE — PROBE MARKER: Brand: XIA

## (undated) DEVICE — LIGHT HANDLE COVER SLEEVE DISP BLUE STELLAR

## (undated) DEVICE — SPONGE SCRUB 4 PCT CHLORHEXIDINE